# Patient Record
Sex: MALE | Race: WHITE | Employment: OTHER | URBAN - METROPOLITAN AREA
[De-identification: names, ages, dates, MRNs, and addresses within clinical notes are randomized per-mention and may not be internally consistent; named-entity substitution may affect disease eponyms.]

---

## 2019-01-01 ENCOUNTER — APPOINTMENT (OUTPATIENT)
Dept: CT IMAGING | Age: 69
DRG: 001 | End: 2019-01-01
Attending: PSYCHIATRY & NEUROLOGY
Payer: MEDICARE

## 2019-01-01 ENCOUNTER — APPOINTMENT (OUTPATIENT)
Dept: GENERAL RADIOLOGY | Age: 69
DRG: 001 | End: 2019-01-01
Attending: NURSE PRACTITIONER
Payer: MEDICARE

## 2019-01-01 ENCOUNTER — OFFICE VISIT (OUTPATIENT)
Dept: CARDIOLOGY CLINIC | Age: 69
End: 2019-01-01

## 2019-01-01 ENCOUNTER — TELEPHONE (OUTPATIENT)
Dept: CARDIOLOGY CLINIC | Age: 69
End: 2019-01-01

## 2019-01-01 ENCOUNTER — APPOINTMENT (OUTPATIENT)
Dept: GENERAL RADIOLOGY | Age: 69
DRG: 001 | End: 2019-01-01
Attending: THORACIC SURGERY (CARDIOTHORACIC VASCULAR SURGERY)
Payer: MEDICARE

## 2019-01-01 ENCOUNTER — ANESTHESIA EVENT (OUTPATIENT)
Dept: SURGERY | Age: 69
DRG: 001 | End: 2019-01-01
Payer: MEDICARE

## 2019-01-01 ENCOUNTER — DOCUMENTATION ONLY (OUTPATIENT)
Dept: SLEEP MEDICINE | Age: 69
End: 2019-01-01

## 2019-01-01 ENCOUNTER — APPOINTMENT (OUTPATIENT)
Dept: GENERAL RADIOLOGY | Age: 69
DRG: 001 | End: 2019-01-01
Payer: MEDICARE

## 2019-01-01 ENCOUNTER — HOSPITAL ENCOUNTER (OUTPATIENT)
Dept: SLEEP MEDICINE | Age: 69
Discharge: HOME | End: 2019-09-30
Payer: MEDICARE

## 2019-01-01 ENCOUNTER — APPOINTMENT (OUTPATIENT)
Dept: PET IMAGING | Age: 69
DRG: 001 | End: 2019-01-01
Payer: MEDICARE

## 2019-01-01 ENCOUNTER — APPOINTMENT (OUTPATIENT)
Dept: GENERAL RADIOLOGY | Age: 69
DRG: 001 | End: 2019-01-01
Attending: INTERNAL MEDICINE
Payer: MEDICARE

## 2019-01-01 ENCOUNTER — APPOINTMENT (OUTPATIENT)
Dept: GENERAL RADIOLOGY | Age: 69
DRG: 001 | End: 2019-01-01
Attending: RADIOLOGY
Payer: MEDICARE

## 2019-01-01 ENCOUNTER — APPOINTMENT (OUTPATIENT)
Dept: NUCLEAR MEDICINE | Age: 69
DRG: 001 | End: 2019-01-01
Attending: INTERNAL MEDICINE
Payer: MEDICARE

## 2019-01-01 ENCOUNTER — APPOINTMENT (OUTPATIENT)
Dept: NON INVASIVE DIAGNOSTICS | Age: 69
DRG: 001 | End: 2019-01-01
Attending: INTERNAL MEDICINE
Payer: MEDICARE

## 2019-01-01 ENCOUNTER — TELEPHONE (OUTPATIENT)
Dept: SLEEP MEDICINE | Age: 69
End: 2019-01-01

## 2019-01-01 ENCOUNTER — APPOINTMENT (OUTPATIENT)
Dept: VASCULAR SURGERY | Age: 69
DRG: 001 | End: 2019-01-01
Attending: INTERNAL MEDICINE
Payer: MEDICARE

## 2019-01-01 ENCOUNTER — APPOINTMENT (OUTPATIENT)
Dept: VASCULAR SURGERY | Age: 69
DRG: 001 | End: 2019-01-01
Attending: NURSE PRACTITIONER
Payer: MEDICARE

## 2019-01-01 ENCOUNTER — APPOINTMENT (OUTPATIENT)
Dept: CT IMAGING | Age: 69
DRG: 001 | End: 2019-01-01
Attending: NURSE PRACTITIONER
Payer: MEDICARE

## 2019-01-01 ENCOUNTER — ANESTHESIA EVENT (OUTPATIENT)
Dept: ENDOSCOPY | Age: 69
DRG: 001 | End: 2019-01-01
Payer: MEDICARE

## 2019-01-01 ENCOUNTER — ANESTHESIA EVENT (OUTPATIENT)
Dept: CARDIOTHORACIC SURGERY | Age: 69
DRG: 001 | End: 2019-01-01
Payer: MEDICARE

## 2019-01-01 ENCOUNTER — APPOINTMENT (OUTPATIENT)
Dept: NON INVASIVE DIAGNOSTICS | Age: 69
DRG: 001 | End: 2019-01-01
Attending: THORACIC SURGERY (CARDIOTHORACIC VASCULAR SURGERY)
Payer: MEDICARE

## 2019-01-01 ENCOUNTER — HOSPITAL ENCOUNTER (INPATIENT)
Age: 69
LOS: 97 days | Discharge: NURSING, REHAB OR LONG-TERM CARE | DRG: 001 | End: 2020-01-30
Attending: INTERNAL MEDICINE | Admitting: INTERNAL MEDICINE
Payer: MEDICARE

## 2019-01-01 ENCOUNTER — APPOINTMENT (OUTPATIENT)
Dept: ULTRASOUND IMAGING | Age: 69
DRG: 001 | End: 2019-01-01
Attending: NURSE PRACTITIONER
Payer: MEDICARE

## 2019-01-01 ENCOUNTER — APPOINTMENT (OUTPATIENT)
Dept: NUCLEAR MEDICINE | Age: 69
DRG: 001 | End: 2019-01-01
Attending: NURSE PRACTITIONER
Payer: MEDICARE

## 2019-01-01 ENCOUNTER — APPOINTMENT (OUTPATIENT)
Dept: NON INVASIVE DIAGNOSTICS | Age: 69
DRG: 001 | End: 2019-01-01
Attending: NURSE PRACTITIONER
Payer: MEDICARE

## 2019-01-01 ENCOUNTER — ANESTHESIA (OUTPATIENT)
Dept: ENDOSCOPY | Age: 69
DRG: 001 | End: 2019-01-01
Payer: MEDICARE

## 2019-01-01 ENCOUNTER — ANESTHESIA (OUTPATIENT)
Dept: CARDIOTHORACIC SURGERY | Age: 69
DRG: 001 | End: 2019-01-01
Payer: MEDICARE

## 2019-01-01 ENCOUNTER — HOSPITAL ENCOUNTER (OUTPATIENT)
Dept: NON INVASIVE DIAGNOSTICS | Age: 69
Discharge: HOME | End: 2019-11-18
Attending: THORACIC SURGERY (CARDIOTHORACIC VASCULAR SURGERY)

## 2019-01-01 ENCOUNTER — HOSPITAL ENCOUNTER (OUTPATIENT)
Age: 69
Setting detail: OUTPATIENT SURGERY
Discharge: HOME | End: 2019-09-18
Attending: INTERNAL MEDICINE | Admitting: INTERNAL MEDICINE
Payer: MEDICARE

## 2019-01-01 ENCOUNTER — HOSPITAL ENCOUNTER (OUTPATIENT)
Dept: NON INVASIVE DIAGNOSTICS | Age: 69
Discharge: HOME | End: 2019-10-29
Attending: THORACIC SURGERY (CARDIOTHORACIC VASCULAR SURGERY)

## 2019-01-01 ENCOUNTER — ANESTHESIA (OUTPATIENT)
Dept: SURGERY | Age: 69
DRG: 001 | End: 2019-01-01
Payer: MEDICARE

## 2019-01-01 ENCOUNTER — HOSPITAL ENCOUNTER (OUTPATIENT)
Dept: NUCLEAR MEDICINE | Age: 69
Discharge: HOME | DRG: 001 | End: 2019-10-30
Attending: NURSE PRACTITIONER
Payer: MEDICARE

## 2019-01-01 ENCOUNTER — OFFICE VISIT (OUTPATIENT)
Dept: SLEEP MEDICINE | Age: 69
End: 2019-01-01

## 2019-01-01 ENCOUNTER — HOSPITAL ENCOUNTER (OUTPATIENT)
Dept: SLEEP MEDICINE | Age: 69
Discharge: HOME | End: 2019-09-16
Attending: INTERNAL MEDICINE
Payer: MEDICARE

## 2019-01-01 ENCOUNTER — NURSE NAVIGATOR (OUTPATIENT)
Dept: CASE MANAGEMENT | Age: 69
End: 2019-01-01

## 2019-01-01 ENCOUNTER — APPOINTMENT (OUTPATIENT)
Dept: VASCULAR SURGERY | Age: 69
DRG: 001 | End: 2019-01-01
Payer: MEDICARE

## 2019-01-01 ENCOUNTER — TELEPHONE (OUTPATIENT)
Dept: CARDIAC REHAB | Age: 69
End: 2019-01-01

## 2019-01-01 VITALS
RESPIRATION RATE: 20 BRPM | WEIGHT: 196 LBS | BODY MASS INDEX: 25.15 KG/M2 | OXYGEN SATURATION: 95 % | DIASTOLIC BLOOD PRESSURE: 53 MMHG | HEART RATE: 70 BPM | HEIGHT: 74 IN | SYSTOLIC BLOOD PRESSURE: 92 MMHG | TEMPERATURE: 98.1 F

## 2019-01-01 VITALS
OXYGEN SATURATION: 93 % | BODY MASS INDEX: 25.65 KG/M2 | WEIGHT: 199.9 LBS | DIASTOLIC BLOOD PRESSURE: 63 MMHG | TEMPERATURE: 97.1 F | HEART RATE: 72 BPM | HEIGHT: 74 IN | SYSTOLIC BLOOD PRESSURE: 96 MMHG

## 2019-01-01 VITALS
SYSTOLIC BLOOD PRESSURE: 100 MMHG | HEART RATE: 70 BPM | HEIGHT: 74 IN | TEMPERATURE: 96.9 F | BODY MASS INDEX: 26.64 KG/M2 | OXYGEN SATURATION: 99 % | WEIGHT: 207.6 LBS | DIASTOLIC BLOOD PRESSURE: 70 MMHG | RESPIRATION RATE: 18 BRPM

## 2019-01-01 VITALS
DIASTOLIC BLOOD PRESSURE: 71 MMHG | WEIGHT: 199 LBS | BODY MASS INDEX: 25.54 KG/M2 | SYSTOLIC BLOOD PRESSURE: 104 MMHG | HEIGHT: 74 IN | OXYGEN SATURATION: 94 % | HEART RATE: 80 BPM

## 2019-01-01 VITALS
HEIGHT: 74 IN | BODY MASS INDEX: 25.93 KG/M2 | DIASTOLIC BLOOD PRESSURE: 68 MMHG | OXYGEN SATURATION: 97 % | TEMPERATURE: 97.4 F | WEIGHT: 202 LBS | RESPIRATION RATE: 20 BRPM | HEART RATE: 70 BPM | SYSTOLIC BLOOD PRESSURE: 106 MMHG

## 2019-01-01 VITALS
TEMPERATURE: 97.8 F | HEART RATE: 70 BPM | BODY MASS INDEX: 24.83 KG/M2 | RESPIRATION RATE: 18 BRPM | SYSTOLIC BLOOD PRESSURE: 94 MMHG | DIASTOLIC BLOOD PRESSURE: 62 MMHG | WEIGHT: 193.4 LBS | OXYGEN SATURATION: 97 %

## 2019-01-01 VITALS
DIASTOLIC BLOOD PRESSURE: 68 MMHG | HEART RATE: 85 BPM | BODY MASS INDEX: 26.28 KG/M2 | WEIGHT: 204.8 LBS | OXYGEN SATURATION: 97 % | HEIGHT: 74 IN | SYSTOLIC BLOOD PRESSURE: 101 MMHG

## 2019-01-01 DIAGNOSIS — I50.22 SYSTOLIC CHF, CHRONIC (HCC): ICD-10-CM

## 2019-01-01 DIAGNOSIS — Z86.79 H/O VENTRICULAR FIBRILLATION: ICD-10-CM

## 2019-01-01 DIAGNOSIS — G25.3 MYOCLONUS: ICD-10-CM

## 2019-01-01 DIAGNOSIS — R78.81 GRAM-NEGATIVE BACTEREMIA: ICD-10-CM

## 2019-01-01 DIAGNOSIS — I73.9 PERIPHERAL VASCULAR DISEASE (HCC): ICD-10-CM

## 2019-01-01 DIAGNOSIS — G56.12 MEDIAN NERVE DYSFUNCTION, LEFT: ICD-10-CM

## 2019-01-01 DIAGNOSIS — R54 FRAILTY SYNDROME IN GERIATRIC PATIENT: ICD-10-CM

## 2019-01-01 DIAGNOSIS — I25.5 ISCHEMIC CARDIOMYOPATHY: Primary | ICD-10-CM

## 2019-01-01 DIAGNOSIS — R59.0 MEDIASTINAL LYMPHADENOPATHY: ICD-10-CM

## 2019-01-01 DIAGNOSIS — D61.818 PANCYTOPENIA (HCC): ICD-10-CM

## 2019-01-01 DIAGNOSIS — R06.02 SHORTNESS OF BREATH: ICD-10-CM

## 2019-01-01 DIAGNOSIS — E46 MALNUTRITION, UNSPECIFIED TYPE (HCC): ICD-10-CM

## 2019-01-01 DIAGNOSIS — Z99.89 OSA ON CPAP: ICD-10-CM

## 2019-01-01 DIAGNOSIS — I50.810 RVF (RIGHT VENTRICULAR FAILURE) (HCC): ICD-10-CM

## 2019-01-01 DIAGNOSIS — R55 SYNCOPE, UNSPECIFIED SYNCOPE TYPE: ICD-10-CM

## 2019-01-01 DIAGNOSIS — N18.30 STAGE 3 CHRONIC KIDNEY DISEASE (HCC): ICD-10-CM

## 2019-01-01 DIAGNOSIS — I48.0 PAROXYSMAL ATRIAL FIBRILLATION (HCC): ICD-10-CM

## 2019-01-01 DIAGNOSIS — I50.9 ACUTE DECOMPENSATED HEART FAILURE (HCC): ICD-10-CM

## 2019-01-01 DIAGNOSIS — H49.01 RIGHT OCULOMOTOR NERVE PALSY: ICD-10-CM

## 2019-01-01 DIAGNOSIS — Z99.81 ON SUPPLEMENTAL OXYGEN THERAPY: ICD-10-CM

## 2019-01-01 DIAGNOSIS — I50.9 DYSPNEA DUE TO CONGESTIVE HEART FAILURE (HCC): ICD-10-CM

## 2019-01-01 DIAGNOSIS — N30.01 ACUTE CYSTITIS WITH HEMATURIA: ICD-10-CM

## 2019-01-01 DIAGNOSIS — Z79.899 RECEIVING INOTROPIC MEDICATION: ICD-10-CM

## 2019-01-01 DIAGNOSIS — G47.33 OSA ON CPAP: ICD-10-CM

## 2019-01-01 DIAGNOSIS — G47.33 OSA (OBSTRUCTIVE SLEEP APNEA): Primary | ICD-10-CM

## 2019-01-01 DIAGNOSIS — J96.00 ACUTE RESPIRATORY FAILURE, UNSPECIFIED WHETHER WITH HYPOXIA OR HYPERCAPNIA (HCC): ICD-10-CM

## 2019-01-01 DIAGNOSIS — I50.23 HEART FAILURE, SYSTOLIC, ACUTE ON CHRONIC (HCC): ICD-10-CM

## 2019-01-01 DIAGNOSIS — R42 DIZZINESS: ICD-10-CM

## 2019-01-01 DIAGNOSIS — Z86.79 H/O HEART FAILURE: ICD-10-CM

## 2019-01-01 DIAGNOSIS — I48.0 PAROXYSMAL ATRIAL FIBRILLATION (HCC): Primary | ICD-10-CM

## 2019-01-01 DIAGNOSIS — M79.601 RIGHT ARM PAIN: ICD-10-CM

## 2019-01-01 DIAGNOSIS — E61.1 IRON DEFICIENCY: ICD-10-CM

## 2019-01-01 DIAGNOSIS — R64 CACHEXIA (HCC): ICD-10-CM

## 2019-01-01 DIAGNOSIS — G47.33 OSA (OBSTRUCTIVE SLEEP APNEA): ICD-10-CM

## 2019-01-01 DIAGNOSIS — R31.0 GROSS HEMATURIA: ICD-10-CM

## 2019-01-01 DIAGNOSIS — I50.22 CHRONIC SYSTOLIC HEART FAILURE (HCC): ICD-10-CM

## 2019-01-01 DIAGNOSIS — R53.81 PHYSICAL DECONDITIONING: ICD-10-CM

## 2019-01-01 DIAGNOSIS — I95.1 ORTHOSTATIC HYPOTENSION: ICD-10-CM

## 2019-01-01 DIAGNOSIS — Z87.898 HISTORY OF URINARY RETENTION: ICD-10-CM

## 2019-01-01 DIAGNOSIS — I50.23 SYSTOLIC CHF, ACUTE ON CHRONIC (HCC): ICD-10-CM

## 2019-01-01 DIAGNOSIS — I25.5 ISCHEMIC CARDIOMYOPATHY: ICD-10-CM

## 2019-01-01 DIAGNOSIS — R57.0 SHOCK, CARDIOGENIC (HCC): ICD-10-CM

## 2019-01-01 DIAGNOSIS — Z51.81 ANTICOAGULATION MANAGEMENT ENCOUNTER: ICD-10-CM

## 2019-01-01 DIAGNOSIS — J38.00 VOCAL CORD PARALYSIS: ICD-10-CM

## 2019-01-01 DIAGNOSIS — G25.5 CHOREA: ICD-10-CM

## 2019-01-01 DIAGNOSIS — R04.0 EPISTAXIS: ICD-10-CM

## 2019-01-01 DIAGNOSIS — E87.70 HYPERVOLEMIA, UNSPECIFIED HYPERVOLEMIA TYPE: ICD-10-CM

## 2019-01-01 DIAGNOSIS — I50.23 ACUTE ON CHRONIC SYSTOLIC CHF (CONGESTIVE HEART FAILURE) (HCC): ICD-10-CM

## 2019-01-01 DIAGNOSIS — R25.1 TREMOR: ICD-10-CM

## 2019-01-01 DIAGNOSIS — F41.8 ANXIETY ABOUT HEALTH: ICD-10-CM

## 2019-01-01 DIAGNOSIS — Z90.5 SINGLE KIDNEY: ICD-10-CM

## 2019-01-01 DIAGNOSIS — Z95.811 LVAD (LEFT VENTRICULAR ASSIST DEVICE) PRESENT (HCC): ICD-10-CM

## 2019-01-01 DIAGNOSIS — D69.6 THROMBOCYTOPENIA (HCC): ICD-10-CM

## 2019-01-01 DIAGNOSIS — Z01.818 PREOPERATIVE EVALUATION OF A MEDICAL CONDITION TO RULE OUT SURGICAL CONTRAINDICATIONS (TAR REQUIRED): ICD-10-CM

## 2019-01-01 DIAGNOSIS — Z79.01 ANTICOAGULATION MANAGEMENT ENCOUNTER: ICD-10-CM

## 2019-01-01 LAB
25(OH)D3 SERPL-MCNC: 18.9 NG/ML (ref 30–100)
ABO + RH BLD: NORMAL
ACT BLD: 125 SECS (ref 79–138)
ADMINISTERED INITIALS, ADMINIT: NORMAL
ALBUMIN SERPL-MCNC: 2 G/DL (ref 3.5–5)
ALBUMIN SERPL-MCNC: 2.1 G/DL (ref 3.5–5)
ALBUMIN SERPL-MCNC: 2.2 G/DL (ref 3.5–5)
ALBUMIN SERPL-MCNC: 2.3 G/DL (ref 3.5–5)
ALBUMIN SERPL-MCNC: 2.4 G/DL (ref 3.5–5)
ALBUMIN SERPL-MCNC: 2.5 G/DL (ref 3.5–5)
ALBUMIN SERPL-MCNC: 2.6 G/DL (ref 3.5–5)
ALBUMIN SERPL-MCNC: 2.7 G/DL (ref 3.5–5)
ALBUMIN SERPL-MCNC: 2.8 G/DL (ref 3.5–5)
ALBUMIN SERPL-MCNC: 2.9 G/DL (ref 3.5–5)
ALBUMIN SERPL-MCNC: 3.1 G/DL (ref 3.5–5)
ALBUMIN SERPL-MCNC: 3.2 G/DL (ref 3.5–5)
ALBUMIN SERPL-MCNC: 3.2 G/DL (ref 3.5–5)
ALBUMIN/GLOB SERPL: 0.6 {RATIO} (ref 1.1–2.2)
ALBUMIN/GLOB SERPL: 0.7 {RATIO} (ref 1.1–2.2)
ALBUMIN/GLOB SERPL: 0.8 {RATIO} (ref 1.1–2.2)
ALBUMIN/GLOB SERPL: 0.9 {RATIO} (ref 1.1–2.2)
ALBUMIN/GLOB SERPL: 1 {RATIO} (ref 1.1–2.2)
ALBUMIN/GLOB SERPL: 1 {RATIO} (ref 1.1–2.2)
ALBUMIN/GLOB SERPL: 1.1 {RATIO} (ref 1.1–2.2)
ALBUMIN/GLOB SERPL: 1.2 {RATIO} (ref 1.1–2.2)
ALBUMIN/GLOB SERPL: 1.2 {RATIO} (ref 1.1–2.2)
ALP SERPL-CCNC: 100 U/L (ref 45–117)
ALP SERPL-CCNC: 101 U/L (ref 45–117)
ALP SERPL-CCNC: 101 U/L (ref 45–117)
ALP SERPL-CCNC: 103 U/L (ref 45–117)
ALP SERPL-CCNC: 104 U/L (ref 45–117)
ALP SERPL-CCNC: 105 U/L (ref 45–117)
ALP SERPL-CCNC: 106 U/L (ref 45–117)
ALP SERPL-CCNC: 106 U/L (ref 45–117)
ALP SERPL-CCNC: 107 U/L (ref 45–117)
ALP SERPL-CCNC: 110 U/L (ref 45–117)
ALP SERPL-CCNC: 111 U/L (ref 45–117)
ALP SERPL-CCNC: 114 U/L (ref 45–117)
ALP SERPL-CCNC: 115 U/L (ref 45–117)
ALP SERPL-CCNC: 115 U/L (ref 45–117)
ALP SERPL-CCNC: 119 U/L (ref 45–117)
ALP SERPL-CCNC: 121 U/L (ref 45–117)
ALP SERPL-CCNC: 122 U/L (ref 45–117)
ALP SERPL-CCNC: 122 U/L (ref 45–117)
ALP SERPL-CCNC: 124 U/L (ref 45–117)
ALP SERPL-CCNC: 124 U/L (ref 45–117)
ALP SERPL-CCNC: 125 U/L (ref 45–117)
ALP SERPL-CCNC: 126 U/L (ref 45–117)
ALP SERPL-CCNC: 126 U/L (ref 45–117)
ALP SERPL-CCNC: 130 U/L (ref 45–117)
ALP SERPL-CCNC: 131 U/L (ref 45–117)
ALP SERPL-CCNC: 131 U/L (ref 45–117)
ALP SERPL-CCNC: 132 U/L (ref 45–117)
ALP SERPL-CCNC: 133 U/L (ref 45–117)
ALP SERPL-CCNC: 136 U/L (ref 45–117)
ALP SERPL-CCNC: 136 U/L (ref 45–117)
ALP SERPL-CCNC: 137 U/L (ref 45–117)
ALP SERPL-CCNC: 137 U/L (ref 45–117)
ALP SERPL-CCNC: 139 U/L (ref 45–117)
ALP SERPL-CCNC: 140 U/L (ref 45–117)
ALP SERPL-CCNC: 141 U/L (ref 45–117)
ALP SERPL-CCNC: 143 U/L (ref 45–117)
ALP SERPL-CCNC: 146 U/L (ref 45–117)
ALP SERPL-CCNC: 153 U/L (ref 45–117)
ALP SERPL-CCNC: 66 U/L (ref 45–117)
ALP SERPL-CCNC: 67 U/L (ref 45–117)
ALP SERPL-CCNC: 72 U/L (ref 45–117)
ALP SERPL-CCNC: 76 U/L (ref 45–117)
ALP SERPL-CCNC: 82 U/L (ref 45–117)
ALP SERPL-CCNC: 84 U/L (ref 45–117)
ALP SERPL-CCNC: 87 U/L (ref 45–117)
ALP SERPL-CCNC: 89 U/L (ref 45–117)
ALP SERPL-CCNC: 90 U/L (ref 45–117)
ALP SERPL-CCNC: 91 U/L (ref 45–117)
ALP SERPL-CCNC: 92 U/L (ref 45–117)
ALP SERPL-CCNC: 93 U/L (ref 45–117)
ALP SERPL-CCNC: 94 U/L (ref 45–117)
ALP SERPL-CCNC: 96 U/L (ref 45–117)
ALP SERPL-CCNC: 97 U/L (ref 45–117)
ALP SERPL-CCNC: 98 U/L (ref 45–117)
ALT SERPL-CCNC: 10 U/L (ref 12–78)
ALT SERPL-CCNC: 10 U/L (ref 12–78)
ALT SERPL-CCNC: 107 U/L (ref 12–78)
ALT SERPL-CCNC: 11 U/L (ref 12–78)
ALT SERPL-CCNC: 114 U/L (ref 12–78)
ALT SERPL-CCNC: 12 U/L (ref 12–78)
ALT SERPL-CCNC: 13 U/L (ref 12–78)
ALT SERPL-CCNC: 13 U/L (ref 12–78)
ALT SERPL-CCNC: 138 U/L (ref 12–78)
ALT SERPL-CCNC: 14 U/L (ref 12–78)
ALT SERPL-CCNC: 15 U/L (ref 12–78)
ALT SERPL-CCNC: 15 U/L (ref 12–78)
ALT SERPL-CCNC: 156 U/L (ref 12–78)
ALT SERPL-CCNC: 16 U/L (ref 12–78)
ALT SERPL-CCNC: 18 U/L (ref 12–78)
ALT SERPL-CCNC: 19 U/L (ref 12–78)
ALT SERPL-CCNC: 19 U/L (ref 12–78)
ALT SERPL-CCNC: 20 U/L (ref 12–78)
ALT SERPL-CCNC: 22 U/L (ref 12–78)
ALT SERPL-CCNC: 23 U/L (ref 12–78)
ALT SERPL-CCNC: 24 U/L (ref 12–78)
ALT SERPL-CCNC: 27 U/L (ref 12–78)
ALT SERPL-CCNC: 28 U/L (ref 12–78)
ALT SERPL-CCNC: 28 U/L (ref 12–78)
ALT SERPL-CCNC: 30 U/L (ref 12–78)
ALT SERPL-CCNC: 41 U/L (ref 12–78)
ALT SERPL-CCNC: 6 U/L (ref 12–78)
ALT SERPL-CCNC: 7 U/L (ref 12–78)
ALT SERPL-CCNC: 8 U/L (ref 12–78)
ALT SERPL-CCNC: 8 U/L (ref 12–78)
ALT SERPL-CCNC: 9 U/L (ref 12–78)
ALT SERPL-CCNC: 94 U/L (ref 12–78)
ALT SERPL-CCNC: <6 U/L (ref 12–78)
AMMONIA PLAS-SCNC: 15 UMOL/L
AMPHET UR QL SCN: NEGATIVE
ANION GAP SERPL CALC-SCNC: 1 MMOL/L (ref 5–15)
ANION GAP SERPL CALC-SCNC: 1 MMOL/L (ref 5–15)
ANION GAP SERPL CALC-SCNC: 2 MMOL/L (ref 5–15)
ANION GAP SERPL CALC-SCNC: 3 MMOL/L (ref 5–15)
ANION GAP SERPL CALC-SCNC: 4 MMOL/L (ref 5–15)
ANION GAP SERPL CALC-SCNC: 5 MMOL/L (ref 5–15)
ANION GAP SERPL CALC-SCNC: 6 MMOL/L (ref 5–15)
ANION GAP SERPL CALC-SCNC: 7 MMOL/L (ref 5–15)
ANION GAP SERPL CALC-SCNC: 8 MMOL/L (ref 5–15)
ANION GAP SERPL CALC-SCNC: 9 MMOL/L (ref 5–15)
ANTI-COMPLEMENT (C3B,C3D): NORMAL
APPEARANCE UR: ABNORMAL
APPEARANCE UR: ABNORMAL
APPEARANCE UR: CLEAR
APTT PPP: 117.1 SEC (ref 22.1–32)
APTT PPP: 125.7 SEC (ref 22.1–32)
APTT PPP: 22.9 SEC (ref 22.1–32)
APTT PPP: 23.4 SEC (ref 22.1–32)
APTT PPP: 26.2 SEC (ref 22.1–32)
APTT PPP: 26.3 SEC (ref 22.1–32)
APTT PPP: 26.4 SEC (ref 22.1–32)
APTT PPP: 26.5 SEC (ref 22.1–32)
APTT PPP: 27.1 SEC (ref 22.1–32)
APTT PPP: 27.5 SEC (ref 22.1–32)
APTT PPP: 27.6 SEC (ref 22.1–32)
APTT PPP: 27.7 SEC (ref 22.1–32)
APTT PPP: 27.7 SEC (ref 22.1–32)
APTT PPP: 27.8 SEC (ref 22.1–32)
APTT PPP: 27.8 SEC (ref 22.1–32)
APTT PPP: 27.9 SEC (ref 22.1–32)
APTT PPP: 27.9 SEC (ref 22.1–32)
APTT PPP: 28.1 SEC (ref 22.1–32)
APTT PPP: 28.2 SEC (ref 22.1–32)
APTT PPP: 28.7 SEC (ref 22.1–32)
APTT PPP: 29.2 SEC (ref 22.1–32)
APTT PPP: 29.3 SEC (ref 22.1–32)
APTT PPP: 29.6 SEC (ref 22.1–32)
APTT PPP: 29.7 SEC (ref 22.1–32)
APTT PPP: 30.7 SEC (ref 22.1–32)
APTT PPP: 31.2 SEC (ref 22.1–32)
APTT PPP: 31.3 SEC (ref 22.1–32)
APTT PPP: 31.3 SEC (ref 22.1–32)
APTT PPP: 31.7 SEC (ref 22.1–32)
APTT PPP: 31.9 SEC (ref 22.1–32)
APTT PPP: 31.9 SEC (ref 22.1–32)
APTT PPP: 32.1 SEC (ref 22.1–32)
APTT PPP: 32.2 SEC (ref 22.1–32)
APTT PPP: 32.6 SEC (ref 22.1–32)
APTT PPP: 33.5 SEC (ref 22.1–32)
APTT PPP: 33.6 SEC (ref 22.1–32)
APTT PPP: 35 SEC (ref 22.1–32)
APTT PPP: 35.5 SEC (ref 22.1–32)
APTT PPP: 35.6 SEC (ref 22.1–32)
APTT PPP: 36.3 SEC (ref 22.1–32)
APTT PPP: 36.9 SEC (ref 22.1–32)
APTT PPP: 37 SEC (ref 22.1–32)
APTT PPP: 37.3 SEC (ref 22.1–32)
APTT PPP: 37.5 SEC (ref 22.1–32)
APTT PPP: 37.5 SEC (ref 22.1–32)
APTT PPP: 37.6 SEC (ref 22.1–32)
APTT PPP: 37.7 SEC (ref 22.1–32)
APTT PPP: 37.8 SEC (ref 22.1–32)
APTT PPP: 37.9 SEC (ref 22.1–32)
APTT PPP: 38.8 SEC (ref 22.1–32)
APTT PPP: 39.3 SEC (ref 22.1–32)
APTT PPP: 40 SEC (ref 22.1–32)
APTT PPP: 40 SEC (ref 22.1–32)
APTT PPP: 40.4 SEC (ref 22.1–32)
APTT PPP: 40.7 SEC (ref 22.1–32)
APTT PPP: 40.9 SEC (ref 22.1–32)
APTT PPP: 41.2 SEC (ref 22.1–32)
APTT PPP: 41.4 SEC (ref 22.1–32)
APTT PPP: 41.9 SEC (ref 22.1–32)
APTT PPP: 41.9 SEC (ref 22.1–32)
APTT PPP: 43.4 SEC (ref 22.1–32)
APTT PPP: 47 SEC (ref 22.1–32)
APTT PPP: 47.6 SEC (ref 22.1–32)
APTT PPP: 48.9 SEC (ref 22.1–32)
APTT PPP: 49.5 SEC (ref 22.1–32)
APTT PPP: 50.3 SEC (ref 22.1–32)
APTT PPP: 51.3 SEC (ref 22.1–32)
APTT PPP: 51.7 SEC (ref 22.1–32)
APTT PPP: 55.9 SEC (ref 22.1–32)
APTT PPP: 55.9 SEC (ref 22.1–32)
APTT PPP: 57.2 SEC (ref 22.1–32)
APTT PPP: 57.5 SEC (ref 22.1–32)
APTT PPP: 58.5 SEC (ref 22.1–32)
APTT PPP: 60.9 SEC (ref 22.1–32)
APTT PPP: 62.1 SEC (ref 22.1–32)
APTT PPP: 63.9 SEC (ref 22.1–32)
APTT PPP: 64.1 SEC (ref 22.1–32)
APTT PPP: 64.1 SEC (ref 22.1–32)
APTT PPP: 64.4 SEC (ref 22.1–32)
APTT PPP: 65.6 SEC (ref 22.1–32)
APTT PPP: 65.9 SEC (ref 22.1–32)
APTT PPP: 66.2 SEC (ref 22.1–32)
APTT PPP: 66.2 SEC (ref 22.1–32)
APTT PPP: 67.5 SEC (ref 22.1–32)
APTT PPP: 67.7 SEC (ref 22.1–32)
APTT PPP: 68.1 SEC (ref 22.1–32)
APTT PPP: 69.3 SEC (ref 22.1–32)
APTT PPP: 69.8 SEC (ref 22.1–32)
APTT PPP: 69.9 SEC (ref 22.1–32)
APTT PPP: 70.3 SEC (ref 22.1–32)
APTT PPP: 71.1 SEC (ref 22.1–32)
APTT PPP: 73.5 SEC (ref 22.1–32)
APTT PPP: 73.7 SEC (ref 22.1–32)
APTT PPP: 74.4 SEC (ref 22.1–32)
APTT PPP: 77.2 SEC (ref 22.1–32)
APTT PPP: 84 SEC (ref 22.1–32)
ARTERIAL PATENCY WRIST A: ABNORMAL
ARTERIAL PATENCY WRIST A: NORMAL
ARTERIAL PATENCY WRIST A: NORMAL
ARTERIAL PATENCY WRIST A: YES
AST SERPL-CCNC: 10 U/L (ref 15–37)
AST SERPL-CCNC: 109 U/L (ref 15–37)
AST SERPL-CCNC: 11 U/L (ref 15–37)
AST SERPL-CCNC: 11 U/L (ref 15–37)
AST SERPL-CCNC: 12 U/L (ref 15–37)
AST SERPL-CCNC: 13 U/L (ref 15–37)
AST SERPL-CCNC: 13 U/L (ref 15–37)
AST SERPL-CCNC: 14 U/L (ref 15–37)
AST SERPL-CCNC: 15 U/L (ref 15–37)
AST SERPL-CCNC: 16 U/L (ref 15–37)
AST SERPL-CCNC: 17 U/L (ref 15–37)
AST SERPL-CCNC: 18 U/L (ref 15–37)
AST SERPL-CCNC: 20 U/L (ref 15–37)
AST SERPL-CCNC: 20 U/L (ref 15–37)
AST SERPL-CCNC: 21 U/L (ref 15–37)
AST SERPL-CCNC: 22 U/L (ref 15–37)
AST SERPL-CCNC: 23 U/L (ref 15–37)
AST SERPL-CCNC: 24 U/L (ref 15–37)
AST SERPL-CCNC: 25 U/L (ref 15–37)
AST SERPL-CCNC: 26 U/L (ref 15–37)
AST SERPL-CCNC: 26 U/L (ref 15–37)
AST SERPL-CCNC: 27 U/L (ref 15–37)
AST SERPL-CCNC: 27 U/L (ref 15–37)
AST SERPL-CCNC: 28 U/L (ref 15–37)
AST SERPL-CCNC: 28 U/L (ref 15–37)
AST SERPL-CCNC: 29 U/L (ref 15–37)
AST SERPL-CCNC: 30 U/L (ref 15–37)
AST SERPL-CCNC: 34 U/L (ref 15–37)
AST SERPL-CCNC: 35 U/L (ref 15–37)
AST SERPL-CCNC: 36 U/L (ref 15–37)
AST SERPL-CCNC: 40 U/L (ref 15–37)
AST SERPL-CCNC: 40 U/L (ref 15–37)
AST SERPL-CCNC: 42 U/L (ref 15–37)
AST SERPL-CCNC: 46 U/L (ref 15–37)
AST SERPL-CCNC: 48 U/L (ref 15–37)
AST SERPL-CCNC: 49 U/L (ref 15–37)
AST SERPL-CCNC: 50 U/L (ref 15–37)
AST SERPL-CCNC: 52 U/L (ref 15–37)
AST SERPL-CCNC: 55 U/L (ref 15–37)
AST SERPL-CCNC: 58 U/L (ref 15–37)
AST SERPL-CCNC: 60 U/L (ref 15–37)
AST SERPL-CCNC: 90 U/L (ref 15–37)
ATRIAL RATE: 0 BPM
ATRIAL RATE: 100 BPM
ATRIAL RATE: 26 BPM
ATRIAL RATE: 39 BPM
ATRIAL RATE: 55 BPM
ATRIAL RATE: 66 BPM
ATRIAL RATE: 72 BPM
ATRIAL RATE: 76 BPM
ATRIAL RATE: 80 BPM
ATRIAL RATE: 83 BPM
ATRIAL RATE: 89 BPM
ATRIAL RATE: 91 BPM
AV VELOCITY RATIO: 0.48
BACTERIA SPEC CULT: ABNORMAL
BACTERIA SPEC CULT: NORMAL
BACTERIA URNS QL MICRO: ABNORMAL /HPF
BACTERIA URNS QL MICRO: NEGATIVE /HPF
BARBITURATES UR QL SCN: NEGATIVE
BASE DEFICIT BLDV-SCNC: 5 MMOL/L
BASE EXCESS BLD CALC-SCNC: 1 MMOL/L
BASE EXCESS BLD CALC-SCNC: 10 MMOL/L
BASE EXCESS BLD CALC-SCNC: 15 MMOL/L
BASE EXCESS BLD CALC-SCNC: 2 MMOL/L
BASE EXCESS BLD CALC-SCNC: 3 MMOL/L
BASE EXCESS BLD CALC-SCNC: 4 MMOL/L
BASE EXCESS BLD CALC-SCNC: 5 MMOL/L
BASE EXCESS BLD CALC-SCNC: 5 MMOL/L
BASE EXCESS BLD CALC-SCNC: 7 MMOL/L
BASE EXCESS BLD CALC-SCNC: 9 MMOL/L
BASE EXCESS BLD CALC-SCNC: 9 MMOL/L
BASE EXCESS BLDV CALC-SCNC: 0 MMOL/L
BASE EXCESS BLDV CALC-SCNC: 0 MMOL/L
BASE EXCESS BLDV CALC-SCNC: 1 MMOL/L
BASE EXCESS BLDV CALC-SCNC: 2 MMOL/L
BASE EXCESS BLDV CALC-SCNC: 3 MMOL/L
BASE EXCESS BLDV CALC-SCNC: 4 MMOL/L
BASE EXCESS BLDV CALC-SCNC: 5 MMOL/L
BASE EXCESS BLDV CALC-SCNC: 6 MMOL/L
BASE EXCESS BLDV CALC-SCNC: 6 MMOL/L
BASE EXCESS BLDV CALC-SCNC: 7 MMOL/L
BASE EXCESS BLDV CALC-SCNC: 7 MMOL/L
BASE EXCESS BLDV CALC-SCNC: 8 MMOL/L
BASE EXCESS BLDV CALC-SCNC: 9 MMOL/L
BASOPHILS # BLD: 0 K/UL (ref 0–0.1)
BASOPHILS NFR BLD: 0 % (ref 0–1)
BASOPHILS NFR BLD: 0 % (ref 0–1)
BASOPHILS NFR BLD: 1 % (ref 0–1)
BDY SITE: ABNORMAL
BDY SITE: NORMAL
BDY SITE: NORMAL
BENZODIAZ UR QL: NEGATIVE
BILIRUB SERPL-MCNC: 0.4 MG/DL (ref 0.2–1)
BILIRUB SERPL-MCNC: 0.4 MG/DL (ref 0.2–1)
BILIRUB SERPL-MCNC: 0.5 MG/DL (ref 0.2–1)
BILIRUB SERPL-MCNC: 0.6 MG/DL (ref 0.2–1)
BILIRUB SERPL-MCNC: 0.7 MG/DL (ref 0.2–1)
BILIRUB SERPL-MCNC: 0.8 MG/DL (ref 0.2–1)
BILIRUB SERPL-MCNC: 0.9 MG/DL (ref 0.2–1)
BILIRUB SERPL-MCNC: 1 MG/DL (ref 0.2–1)
BILIRUB SERPL-MCNC: 1.1 MG/DL (ref 0.2–1)
BILIRUB SERPL-MCNC: 1.2 MG/DL (ref 0.2–1)
BILIRUB SERPL-MCNC: 1.2 MG/DL (ref 0.2–1)
BILIRUB SERPL-MCNC: 1.3 MG/DL (ref 0.2–1)
BILIRUB SERPL-MCNC: 1.4 MG/DL (ref 0.2–1)
BILIRUB SERPL-MCNC: 1.5 MG/DL (ref 0.2–1)
BILIRUB SERPL-MCNC: 1.5 MG/DL (ref 0.2–1)
BILIRUB SERPL-MCNC: 1.6 MG/DL (ref 0.2–1)
BILIRUB SERPL-MCNC: 1.7 MG/DL (ref 0.2–1)
BILIRUB SERPL-MCNC: 1.8 MG/DL (ref 0.2–1)
BILIRUB SERPL-MCNC: 1.8 MG/DL (ref 0.2–1)
BILIRUB SERPL-MCNC: 1.9 MG/DL (ref 0.2–1)
BILIRUB SERPL-MCNC: 1.9 MG/DL (ref 0.2–1)
BILIRUB SERPL-MCNC: 2.2 MG/DL (ref 0.2–1)
BILIRUB SERPL-MCNC: 2.2 MG/DL (ref 0.2–1)
BILIRUB SERPL-MCNC: 2.3 MG/DL (ref 0.2–1)
BILIRUB SERPL-MCNC: 2.4 MG/DL (ref 0.2–1)
BILIRUB SERPL-MCNC: 2.9 MG/DL (ref 0.2–1)
BILIRUB SERPL-MCNC: 3 MG/DL (ref 0.2–1)
BILIRUB SERPL-MCNC: 3.4 MG/DL (ref 0.2–1)
BILIRUB SERPL-MCNC: 3.7 MG/DL (ref 0.2–1)
BILIRUB SERPL-MCNC: 5.1 MG/DL (ref 0.2–1)
BILIRUB SERPL-MCNC: 5.5 MG/DL (ref 0.2–1)
BILIRUB UR QL CFM: POSITIVE
BILIRUB UR QL: NEGATIVE
BLD GP AB SCN SERPL QL ELUTION: NORMAL
BLD GP AB SCN SERPL QL ELUTION: NORMAL
BLD PROD TYP BPU: NORMAL
BLOOD BANK CMNT PATIENT-IMP: NORMAL
BLOOD GROUP ANTIBODIES SERPL: NORMAL
BNP SERPL-MCNC: 1486 PG/ML
BNP SERPL-MCNC: 1747 PG/ML
BNP SERPL-MCNC: 1787 PG/ML
BNP SERPL-MCNC: 1838 PG/ML
BNP SERPL-MCNC: 2041 PG/ML
BNP SERPL-MCNC: 2388 PG/ML
BNP SERPL-MCNC: 2569 PG/ML
BNP SERPL-MCNC: 3497 PG/ML
BNP SERPL-MCNC: 3694 PG/ML
BNP SERPL-MCNC: 3792 PG/ML
BNP SERPL-MCNC: 3804 PG/ML
BNP SERPL-MCNC: 4953 PG/ML
BNP SERPL-MCNC: 5786 PG/ML
BNP SERPL-MCNC: 5789 PG/ML
BNP SERPL-MCNC: 6172 PG/ML
BNP SERPL-MCNC: 6348 PG/ML
BNP SERPL-MCNC: 6598 PG/ML
BNP SERPL-MCNC: 6642 PG/ML
BNP SERPL-MCNC: 6654 PG/ML
BNP SERPL-MCNC: 6746 PG/ML
BNP SERPL-MCNC: 6781 PG/ML
BNP SERPL-MCNC: 6963 PG/ML
BNP SERPL-MCNC: 7103 PG/ML
BNP SERPL-MCNC: 7464 PG/ML
BNP SERPL-MCNC: 7514 PG/ML
BNP SERPL-MCNC: 7552 PG/ML
BNP SERPL-MCNC: 7603 PG/ML
BNP SERPL-MCNC: 7640 PG/ML
BNP SERPL-MCNC: 7682 PG/ML
BNP SERPL-MCNC: 8187 PG/ML
BNP SERPL-MCNC: 8309 PG/ML
BNP SERPL-MCNC: 8799 PG/ML
BNP SERPL-MCNC: 9096 PG/ML
BNP SERPL-MCNC: 9154 PG/ML
BNP SERPL-MCNC: 9848 PG/ML
BNP SERPL-MCNC: 9961 PG/ML
BNP SERPL-MCNC: ABNORMAL PG/ML
BPU ID: NORMAL
BUN SERPL-MCNC: 12 MG/DL (ref 6–20)
BUN SERPL-MCNC: 13 MG/DL (ref 6–20)
BUN SERPL-MCNC: 14 MG/DL (ref 6–20)
BUN SERPL-MCNC: 14 MG/DL (ref 6–20)
BUN SERPL-MCNC: 15 MG/DL (ref 6–20)
BUN SERPL-MCNC: 15 MG/DL (ref 6–20)
BUN SERPL-MCNC: 16 MG/DL (ref 6–20)
BUN SERPL-MCNC: 17 MG/DL (ref 6–20)
BUN SERPL-MCNC: 17 MG/DL (ref 6–20)
BUN SERPL-MCNC: 18 MG/DL (ref 6–20)
BUN SERPL-MCNC: 19 MG/DL (ref 6–20)
BUN SERPL-MCNC: 20 MG/DL (ref 6–20)
BUN SERPL-MCNC: 21 MG/DL (ref 6–20)
BUN SERPL-MCNC: 21 MG/DL (ref 6–20)
BUN SERPL-MCNC: 22 MG/DL (ref 6–20)
BUN SERPL-MCNC: 23 MG/DL (ref 6–20)
BUN SERPL-MCNC: 24 MG/DL (ref 6–20)
BUN SERPL-MCNC: 25 MG/DL (ref 6–20)
BUN SERPL-MCNC: 25 MG/DL (ref 6–20)
BUN SERPL-MCNC: 26 MG/DL (ref 6–20)
BUN SERPL-MCNC: 27 MG/DL (ref 6–20)
BUN SERPL-MCNC: 27 MG/DL (ref 6–20)
BUN SERPL-MCNC: 28 MG/DL (ref 6–20)
BUN SERPL-MCNC: 28 MG/DL (ref 6–20)
BUN SERPL-MCNC: 29 MG/DL (ref 6–20)
BUN SERPL-MCNC: 30 MG/DL (ref 6–20)
BUN SERPL-MCNC: 32 MG/DL (ref 6–20)
BUN SERPL-MCNC: 32 MG/DL (ref 6–20)
BUN SERPL-MCNC: 33 MG/DL (ref 6–20)
BUN SERPL-MCNC: 33 MG/DL (ref 6–20)
BUN SERPL-MCNC: 34 MG/DL (ref 6–20)
BUN SERPL-MCNC: 34 MG/DL (ref 6–20)
BUN SERPL-MCNC: 35 MG/DL (ref 6–20)
BUN SERPL-MCNC: 38 MG/DL (ref 6–20)
BUN SERPL-MCNC: 39 MG/DL (ref 6–20)
BUN SERPL-MCNC: 39 MG/DL (ref 6–20)
BUN SERPL-MCNC: 40 MG/DL (ref 6–20)
BUN SERPL-MCNC: 41 MG/DL (ref 6–20)
BUN SERPL-MCNC: 43 MG/DL (ref 6–20)
BUN SERPL-MCNC: 44 MG/DL (ref 6–20)
BUN/CREAT SERPL: 10 (ref 12–20)
BUN/CREAT SERPL: 11 (ref 12–20)
BUN/CREAT SERPL: 12 (ref 12–20)
BUN/CREAT SERPL: 13 (ref 12–20)
BUN/CREAT SERPL: 14 (ref 12–20)
BUN/CREAT SERPL: 15 (ref 12–20)
BUN/CREAT SERPL: 16 (ref 12–20)
BUN/CREAT SERPL: 17 (ref 12–20)
BUN/CREAT SERPL: 18 (ref 12–20)
BUN/CREAT SERPL: 19 (ref 12–20)
BUN/CREAT SERPL: 20 (ref 12–20)
BUN/CREAT SERPL: 21 (ref 12–20)
BUN/CREAT SERPL: 23 (ref 12–20)
BUN/CREAT SERPL: 24 (ref 12–20)
BUN/CREAT SERPL: 26 (ref 12–20)
BUN/CREAT SERPL: 9 (ref 12–20)
BUN/CREAT SERPL: 9 (ref 12–20)
CALCIUM SERPL-MCNC: 7.9 MG/DL (ref 8.5–10.1)
CALCIUM SERPL-MCNC: 7.9 MG/DL (ref 8.5–10.1)
CALCIUM SERPL-MCNC: 8.2 MG/DL (ref 8.5–10.1)
CALCIUM SERPL-MCNC: 8.2 MG/DL (ref 8.5–10.1)
CALCIUM SERPL-MCNC: 8.3 MG/DL (ref 8.5–10.1)
CALCIUM SERPL-MCNC: 8.4 MG/DL (ref 8.5–10.1)
CALCIUM SERPL-MCNC: 8.5 MG/DL (ref 8.5–10.1)
CALCIUM SERPL-MCNC: 8.6 MG/DL (ref 8.5–10.1)
CALCIUM SERPL-MCNC: 8.7 MG/DL (ref 8.5–10.1)
CALCIUM SERPL-MCNC: 8.8 MG/DL (ref 8.5–10.1)
CALCIUM SERPL-MCNC: 8.9 MG/DL (ref 8.5–10.1)
CALCIUM SERPL-MCNC: 9 MG/DL (ref 8.5–10.1)
CALCIUM SERPL-MCNC: 9.1 MG/DL (ref 8.5–10.1)
CALCIUM SERPL-MCNC: 9.2 MG/DL (ref 8.5–10.1)
CALCIUM SERPL-MCNC: 9.2 MG/DL (ref 8.5–10.1)
CALCIUM SERPL-MCNC: 9.3 MG/DL (ref 8.5–10.1)
CALCIUM SERPL-MCNC: 9.4 MG/DL (ref 8.5–10.1)
CALCIUM SERPL-MCNC: 9.4 MG/DL (ref 8.5–10.1)
CALCIUM SERPL-MCNC: 9.6 MG/DL (ref 8.5–10.1)
CALCULATED P AXIS, ECG09: -20 DEGREES
CALCULATED P AXIS, ECG09: 9 DEGREES
CALCULATED R AXIS, ECG10: -15 DEGREES
CALCULATED R AXIS, ECG10: -19 DEGREES
CALCULATED R AXIS, ECG10: -34 DEGREES
CALCULATED R AXIS, ECG10: -68 DEGREES
CALCULATED R AXIS, ECG10: 108 DEGREES
CALCULATED R AXIS, ECG10: 13 DEGREES
CALCULATED R AXIS, ECG10: 13 DEGREES
CALCULATED R AXIS, ECG10: 148 DEGREES
CALCULATED R AXIS, ECG10: 2 DEGREES
CALCULATED R AXIS, ECG10: 28 DEGREES
CALCULATED R AXIS, ECG10: 5 DEGREES
CALCULATED R AXIS, ECG10: 81 DEGREES
CALCULATED T AXIS, ECG11: -152 DEGREES
CALCULATED T AXIS, ECG11: -156 DEGREES
CALCULATED T AXIS, ECG11: 10 DEGREES
CALCULATED T AXIS, ECG11: 120 DEGREES
CALCULATED T AXIS, ECG11: 131 DEGREES
CALCULATED T AXIS, ECG11: 150 DEGREES
CALCULATED T AXIS, ECG11: 155 DEGREES
CALCULATED T AXIS, ECG11: 157 DEGREES
CALCULATED T AXIS, ECG11: 175 DEGREES
CALCULATED T AXIS, ECG11: 2 DEGREES
CALCULATED T AXIS, ECG11: 20 DEGREES
CALCULATED T AXIS, ECG11: 66 DEGREES
CANCER AG19-9 SERPL-ACNC: 30 U/ML (ref 0–35)
CANNABINOIDS UR QL SCN: NEGATIVE
CARB RATIO, CHOR: 15
CARB RATIO, CHOR: 15
CARBOHYDRATE EATEN, CHO: 104 G
CARBOHYDRATE EATEN, CHO: 86 G
CC UR VC: ABNORMAL
CC UR VC: ABNORMAL
CC UR VC: NORMAL
CEA SERPL-MCNC: 6.6 NG/ML
CHLORIDE SERPL-SCNC: 100 MMOL/L (ref 97–108)
CHLORIDE SERPL-SCNC: 101 MMOL/L (ref 97–108)
CHLORIDE SERPL-SCNC: 102 MMOL/L (ref 97–108)
CHLORIDE SERPL-SCNC: 103 MMOL/L (ref 97–108)
CHLORIDE SERPL-SCNC: 104 MMOL/L (ref 97–108)
CHLORIDE SERPL-SCNC: 105 MMOL/L (ref 97–108)
CHLORIDE SERPL-SCNC: 105 MMOL/L (ref 97–108)
CHLORIDE SERPL-SCNC: 106 MMOL/L (ref 97–108)
CHLORIDE SERPL-SCNC: 107 MMOL/L (ref 97–108)
CHLORIDE SERPL-SCNC: 109 MMOL/L (ref 97–108)
CHLORIDE SERPL-SCNC: 110 MMOL/L (ref 97–108)
CHLORIDE SERPL-SCNC: 111 MMOL/L (ref 97–108)
CHLORIDE SERPL-SCNC: 112 MMOL/L (ref 97–108)
CHLORIDE SERPL-SCNC: 87 MMOL/L (ref 97–108)
CHLORIDE SERPL-SCNC: 90 MMOL/L (ref 97–108)
CHLORIDE SERPL-SCNC: 91 MMOL/L (ref 97–108)
CHLORIDE SERPL-SCNC: 92 MMOL/L (ref 97–108)
CHLORIDE SERPL-SCNC: 93 MMOL/L (ref 97–108)
CHLORIDE SERPL-SCNC: 94 MMOL/L (ref 97–108)
CHLORIDE SERPL-SCNC: 95 MMOL/L (ref 97–108)
CHLORIDE SERPL-SCNC: 96 MMOL/L (ref 97–108)
CHLORIDE SERPL-SCNC: 97 MMOL/L (ref 97–108)
CHLORIDE SERPL-SCNC: 98 MMOL/L (ref 97–108)
CHLORIDE SERPL-SCNC: 99 MMOL/L (ref 97–108)
CHOLEST SERPL-MCNC: 90 MG/DL
CK MB CFR SERPL CALC: 7.8 % (ref 0–2.5)
CK MB CFR SERPL CALC: 8.7 % (ref 0–2.5)
CK MB SERPL-MCNC: 1.8 NG/ML (ref 5–25)
CK MB SERPL-MCNC: 2 NG/ML (ref 5–25)
CK SERPL-CCNC: 23 U/L (ref 39–308)
CK SERPL-CCNC: 51 U/L (ref 39–308)
CO2 SERPL-SCNC: 26 MMOL/L (ref 21–32)
CO2 SERPL-SCNC: 27 MMOL/L (ref 21–32)
CO2 SERPL-SCNC: 27 MMOL/L (ref 21–32)
CO2 SERPL-SCNC: 28 MMOL/L (ref 21–32)
CO2 SERPL-SCNC: 29 MMOL/L (ref 21–32)
CO2 SERPL-SCNC: 30 MMOL/L (ref 21–32)
CO2 SERPL-SCNC: 31 MMOL/L (ref 21–32)
CO2 SERPL-SCNC: 32 MMOL/L (ref 21–32)
CO2 SERPL-SCNC: 33 MMOL/L (ref 21–32)
CO2 SERPL-SCNC: 34 MMOL/L (ref 21–32)
CO2 SERPL-SCNC: 35 MMOL/L (ref 21–32)
CO2 SERPL-SCNC: 36 MMOL/L (ref 21–32)
CO2 SERPL-SCNC: 37 MMOL/L (ref 21–32)
CO2 SERPL-SCNC: 38 MMOL/L (ref 21–32)
COCAINE UR QL SCN: NEGATIVE
COLOR UR: ABNORMAL
COMMENT, HOLDF: NORMAL
CORTIS SERPL-MCNC: 12.9 UG/DL
CORTIS SERPL-MCNC: 22.4 UG/DL
COTININE UR QL SCN: NEGATIVE NG/ML
CREAT SERPL-MCNC: 0.97 MG/DL (ref 0.7–1.3)
CREAT SERPL-MCNC: 1.03 MG/DL (ref 0.7–1.3)
CREAT SERPL-MCNC: 1.04 MG/DL (ref 0.7–1.3)
CREAT SERPL-MCNC: 1.05 MG/DL (ref 0.7–1.3)
CREAT SERPL-MCNC: 1.07 MG/DL (ref 0.7–1.3)
CREAT SERPL-MCNC: 1.08 MG/DL (ref 0.7–1.3)
CREAT SERPL-MCNC: 1.08 MG/DL (ref 0.7–1.3)
CREAT SERPL-MCNC: 1.1 MG/DL (ref 0.7–1.3)
CREAT SERPL-MCNC: 1.12 MG/DL (ref 0.7–1.3)
CREAT SERPL-MCNC: 1.13 MG/DL (ref 0.7–1.3)
CREAT SERPL-MCNC: 1.15 MG/DL (ref 0.7–1.3)
CREAT SERPL-MCNC: 1.16 MG/DL (ref 0.7–1.3)
CREAT SERPL-MCNC: 1.17 MG/DL (ref 0.7–1.3)
CREAT SERPL-MCNC: 1.19 MG/DL (ref 0.7–1.3)
CREAT SERPL-MCNC: 1.19 MG/DL (ref 0.7–1.3)
CREAT SERPL-MCNC: 1.2 MG/DL (ref 0.7–1.3)
CREAT SERPL-MCNC: 1.2 MG/DL (ref 0.7–1.3)
CREAT SERPL-MCNC: 1.22 MG/DL (ref 0.7–1.3)
CREAT SERPL-MCNC: 1.24 MG/DL (ref 0.7–1.3)
CREAT SERPL-MCNC: 1.25 MG/DL (ref 0.7–1.3)
CREAT SERPL-MCNC: 1.26 MG/DL (ref 0.7–1.3)
CREAT SERPL-MCNC: 1.26 MG/DL (ref 0.7–1.3)
CREAT SERPL-MCNC: 1.28 MG/DL (ref 0.7–1.3)
CREAT SERPL-MCNC: 1.28 MG/DL (ref 0.7–1.3)
CREAT SERPL-MCNC: 1.3 MG/DL (ref 0.7–1.3)
CREAT SERPL-MCNC: 1.3 MG/DL (ref 0.7–1.3)
CREAT SERPL-MCNC: 1.31 MG/DL (ref 0.7–1.3)
CREAT SERPL-MCNC: 1.33 MG/DL (ref 0.7–1.3)
CREAT SERPL-MCNC: 1.35 MG/DL (ref 0.7–1.3)
CREAT SERPL-MCNC: 1.35 MG/DL (ref 0.7–1.3)
CREAT SERPL-MCNC: 1.36 MG/DL (ref 0.7–1.3)
CREAT SERPL-MCNC: 1.4 MG/DL (ref 0.7–1.3)
CREAT SERPL-MCNC: 1.43 MG/DL (ref 0.7–1.3)
CREAT SERPL-MCNC: 1.52 MG/DL (ref 0.7–1.3)
CREAT SERPL-MCNC: 1.52 MG/DL (ref 0.7–1.3)
CREAT SERPL-MCNC: 1.55 MG/DL (ref 0.7–1.3)
CREAT SERPL-MCNC: 1.57 MG/DL (ref 0.7–1.3)
CREAT SERPL-MCNC: 1.59 MG/DL (ref 0.7–1.3)
CREAT SERPL-MCNC: 1.61 MG/DL (ref 0.7–1.3)
CREAT SERPL-MCNC: 1.65 MG/DL (ref 0.7–1.3)
CREAT SERPL-MCNC: 1.68 MG/DL (ref 0.7–1.3)
CREAT SERPL-MCNC: 1.69 MG/DL (ref 0.7–1.3)
CREAT SERPL-MCNC: 1.7 MG/DL (ref 0.7–1.3)
CREAT SERPL-MCNC: 1.75 MG/DL (ref 0.7–1.3)
CREAT SERPL-MCNC: 1.78 MG/DL (ref 0.7–1.3)
CREAT SERPL-MCNC: 1.79 MG/DL (ref 0.7–1.3)
CREAT SERPL-MCNC: 1.79 MG/DL (ref 0.7–1.3)
CREAT SERPL-MCNC: 1.8 MG/DL (ref 0.7–1.3)
CREAT SERPL-MCNC: 1.86 MG/DL (ref 0.7–1.3)
CREAT SERPL-MCNC: 1.88 MG/DL (ref 0.7–1.3)
CREAT SERPL-MCNC: 1.92 MG/DL (ref 0.7–1.3)
CREAT SERPL-MCNC: 1.92 MG/DL (ref 0.7–1.3)
CREAT SERPL-MCNC: 2.01 MG/DL (ref 0.7–1.3)
CREAT SERPL-MCNC: 2.05 MG/DL (ref 0.7–1.3)
CREAT SERPL-MCNC: 2.09 MG/DL (ref 0.7–1.3)
CREAT SERPL-MCNC: 2.13 MG/DL (ref 0.7–1.3)
CREAT SERPL-MCNC: 2.15 MG/DL (ref 0.7–1.3)
CREAT SERPL-MCNC: 2.18 MG/DL (ref 0.7–1.3)
CREAT SERPL-MCNC: 2.21 MG/DL (ref 0.7–1.3)
CREAT SERPL-MCNC: 2.31 MG/DL (ref 0.7–1.3)
CREAT SERPL-MCNC: 2.38 MG/DL (ref 0.7–1.3)
CREAT SERPL-MCNC: 2.44 MG/DL (ref 0.7–1.3)
CREAT SERPL-MCNC: 2.56 MG/DL (ref 0.7–1.3)
CREAT SERPL-MCNC: 2.72 MG/DL (ref 0.7–1.3)
CREAT SERPL-MCNC: 3.01 MG/DL (ref 0.7–1.3)
CREAT SERPL-MCNC: 3.08 MG/DL (ref 0.7–1.3)
CREAT SERPL-MCNC: 3.43 MG/DL (ref 0.7–1.3)
CREAT SERPL-MCNC: 3.46 MG/DL (ref 0.7–1.3)
CREAT UR-MCNC: 110 MG/DL
CROSSMATCH RESULT,%XM: NORMAL
CRP SERPL HS-MCNC: >9.5 MG/L
D50 ADMINISTERED, D50ADM: 0 ML
D50 ADMINISTERED, D50ADM: 8 ML
D50 ADMINISTERED, D50ADM: NORMAL ML
D50 ORDER, D50ORD: 0 ML
D50 ORDER, D50ORD: 10 ML
D50 ORDER, D50ORD: 8 ML
D50 ORDER, D50ORD: 8 ML
D50 ORDER, D50ORD: NORMAL ML
DAT IGG-SP REAG RBC QL: NORMAL
DAT POLY-SP REAG RBC QL: NORMAL
DIAGNOSIS, 93000: NORMAL
DIFFERENTIAL METHOD BLD: ABNORMAL
DIGOXIN SERPL-MCNC: 0.4 NG/ML (ref 0.9–2)
DIGOXIN SERPL-MCNC: 0.7 NG/ML (ref 0.9–2)
DIGOXIN SERPL-MCNC: 0.7 NG/ML (ref 0.9–2)
DIGOXIN SERPL-MCNC: 0.8 NG/ML (ref 0.9–2)
DIGOXIN SERPL-MCNC: 0.9 NG/ML (ref 0.9–2)
DIGOXIN SERPL-MCNC: 1 NG/ML (ref 0.9–2)
DIGOXIN SERPL-MCNC: 1.2 NG/ML (ref 0.9–2)
DIGOXIN SERPL-MCNC: 1.3 NG/ML (ref 0.9–2)
DIGOXIN SERPL-MCNC: 1.4 NG/ML (ref 0.9–2)
DIGOXIN SERPL-MCNC: 1.4 NG/ML (ref 0.9–2)
DIGOXIN SERPL-MCNC: 1.7 NG/ML (ref 0.9–2)
DIGOXIN SERPL-MCNC: 1.7 NG/ML (ref 0.9–2)
DIGOXIN SERPL-MCNC: 1.9 NG/ML (ref 0.9–2)
DIGOXIN SERPL-MCNC: 2 NG/ML (ref 0.9–2)
DIGOXIN SERPL-MCNC: 2.4 NG/ML (ref 0.9–2)
DIGOXIN SERPL-MCNC: 2.6 NG/ML (ref 0.9–2)
DIGOXIN SERPL-MCNC: <0.2 NG/ML (ref 0.9–2)
DISCLAIMER:, 130261: ABNORMAL
DISCLAIMER:, 130261: NORMAL
DRUG SCREEN COMMENT:, 753798: NORMAL
DRUG SCRN COMMENT,DRGCM: NORMAL
DRVVT MIX, 117894: 72 SEC (ref 0–47)
DRVVT RATIO 500585: 1.1 RATIO (ref 0.8–1.2)
ECHO AO ROOT DIAM: 3.63 CM
ECHO AV CUSP MM: 2.22 CM
ECHO AV PEAK GRADIENT: 4.7 MMHG
ECHO AV PEAK VELOCITY: 108.73 CM/S
ECHO EST RA PRESSURE: 10 MMHG
ECHO EST RA PRESSURE: 15 MMHG
ECHO LA AREA 4C: 26 CM2
ECHO LA AREA 4C: 28.2 CM2
ECHO LA AREA 4C: 29.8 CM2
ECHO LA MAJOR AXIS: 4.68 CM
ECHO LA MAJOR AXIS: 4.97 CM
ECHO LA MAJOR AXIS: 5.09 CM
ECHO LA TO AORTIC ROOT RATIO: 1.37
ECHO LA VOL 2C: 114.76 ML (ref 18–58)
ECHO LA VOL 2C: 122.31 ML (ref 18–58)
ECHO LA VOL 2C: 89.85 ML (ref 18–58)
ECHO LA VOL 4C: 104.55 ML (ref 18–58)
ECHO LA VOL 4C: 79.27 ML (ref 18–58)
ECHO LA VOL 4C: 91.99 ML (ref 18–58)
ECHO LA VOL BP: 102.28 ML (ref 18–58)
ECHO LA VOL BP: 106.99 ML (ref 18–58)
ECHO LA VOL BP: 114.22 ML (ref 18–58)
ECHO LA VOL/BSA BIPLANE: 51.1 ML/M2 (ref 16–28)
ECHO LA VOL/BSA BIPLANE: 51.92 ML/M2 (ref 16–28)
ECHO LA VOL/BSA BIPLANE: 54.28 ML/M2 (ref 16–28)
ECHO LA VOLUME INDEX A2C: 42.92 ML/M2 (ref 16–28)
ECHO LA VOLUME INDEX A2C: 58.12 ML/M2 (ref 16–28)
ECHO LA VOLUME INDEX A2C: 58.26 ML/M2 (ref 16–28)
ECHO LA VOLUME INDEX A4C: 40.24 ML/M2 (ref 16–28)
ECHO LA VOLUME INDEX A4C: 43.72 ML/M2 (ref 16–28)
ECHO LA VOLUME INDEX A4C: 49.94 ML/M2 (ref 16–28)
ECHO LV E' LATERAL VELOCITY: 7.87 CM/S
ECHO LV E' LATERAL VELOCITY: 7.91 CM/S
ECHO LV E' LATERAL VELOCITY: 7.98 CM/S
ECHO LV E' SEPTAL VELOCITY: 3.32 CM/S
ECHO LV E' SEPTAL VELOCITY: 3.62 CM/S
ECHO LV E' SEPTAL VELOCITY: 7.5 CM/S
ECHO LV EDV A2C: 287 ML
ECHO LV EDV A4C: 209.9 ML
ECHO LV EDV BP: 259.2 ML (ref 67–155)
ECHO LV EDV INDEX A4C: 99.7 ML/M2
ECHO LV EDV INDEX BP: 123.2 ML/M2
ECHO LV EDV NDEX A2C: 136.4 ML/M2
ECHO LV EJECTION FRACTION A2C: 22 %
ECHO LV EJECTION FRACTION A4C: 17 %
ECHO LV EJECTION FRACTION BIPLANE: 21.3 % (ref 55–100)
ECHO LV ESV A2C: 223.2 ML
ECHO LV ESV A4C: 173.6 ML
ECHO LV ESV BP: 204 ML (ref 22–58)
ECHO LV ESV INDEX A2C: 106.1 ML/M2
ECHO LV ESV INDEX A4C: 82.5 ML/M2
ECHO LV ESV INDEX BP: 96.9 ML/M2
ECHO LV INTERNAL DIMENSION DIASTOLIC: 5.49 CM (ref 4.2–5.9)
ECHO LV INTERNAL DIMENSION DIASTOLIC: 6.07 CM (ref 4.2–5.9)
ECHO LV INTERNAL DIMENSION DIASTOLIC: 6.52 CM (ref 4.2–5.9)
ECHO LV INTERNAL DIMENSION DIASTOLIC: 6.99 CM (ref 4.2–5.9)
ECHO LV INTERNAL DIMENSION DIASTOLIC: 7.36 CM (ref 4.2–5.9)
ECHO LV INTERNAL DIMENSION DIASTOLIC: 7.37 CM (ref 4.2–5.9)
ECHO LV INTERNAL DIMENSION DIASTOLIC: 7.57 CM (ref 4.2–5.9)
ECHO LV INTERNAL DIMENSION DIASTOLIC: 8.21 CM (ref 4.2–5.9)
ECHO LV INTERNAL DIMENSION SYSTOLIC: 4.66 CM
ECHO LV INTERNAL DIMENSION SYSTOLIC: 4.79 CM
ECHO LV INTERNAL DIMENSION SYSTOLIC: 6.18 CM
ECHO LV INTERNAL DIMENSION SYSTOLIC: 6.35 CM
ECHO LV INTERNAL DIMENSION SYSTOLIC: 6.47 CM
ECHO LV INTERNAL DIMENSION SYSTOLIC: 6.76 CM
ECHO LV INTERNAL DIMENSION SYSTOLIC: 7.23 CM
ECHO LV INTERNAL DIMENSION SYSTOLIC: 7.63 CM
ECHO LV IVSD: 0.83 CM (ref 0.6–1)
ECHO LV IVSD: 0.84 CM (ref 0.6–1)
ECHO LV IVSD: 0.88 CM (ref 0.6–1)
ECHO LV IVSD: 0.92 CM (ref 0.6–1)
ECHO LV IVSD: 0.93 CM (ref 0.6–1)
ECHO LV IVSD: 1.01 CM (ref 0.6–1)
ECHO LV MASS 2D: 252 G (ref 88–224)
ECHO LV MASS 2D: 283 G (ref 88–224)
ECHO LV MASS 2D: 340.7 G (ref 88–224)
ECHO LV MASS 2D: 409.1 G (ref 88–224)
ECHO LV MASS 2D: 441.3 G (ref 88–224)
ECHO LV MASS 2D: 465.2 G (ref 88–224)
ECHO LV MASS INDEX 2D: 115.3 G/M2 (ref 49–115)
ECHO LV MASS INDEX 2D: 132.6 G/M2 (ref 49–115)
ECHO LV MASS INDEX 2D: 155.1 G/M2 (ref 49–115)
ECHO LV MASS INDEX 2D: 194.4 G/M2 (ref 49–115)
ECHO LV MASS INDEX 2D: 210.8 G/M2 (ref 49–115)
ECHO LV MASS INDEX 2D: 236.2 G/M2 (ref 49–115)
ECHO LV POSTERIOR WALL DIASTOLIC: 0.75 CM (ref 0.6–1)
ECHO LV POSTERIOR WALL DIASTOLIC: 1 CM (ref 0.6–1)
ECHO LV POSTERIOR WALL DIASTOLIC: 1.05 CM (ref 0.6–1)
ECHO LV POSTERIOR WALL DIASTOLIC: 1.17 CM (ref 0.6–1)
ECHO LV POSTERIOR WALL DIASTOLIC: 1.25 CM (ref 0.6–1)
ECHO LV POSTERIOR WALL DIASTOLIC: 1.32 CM (ref 0.6–1)
ECHO LVOT PEAK GRADIENT: 1.1 MMHG
ECHO LVOT PEAK VELOCITY: 52.17 CM/S
ECHO MV A VELOCITY: 29.53 CM/S
ECHO MV A VELOCITY: 30.63 CM/S
ECHO MV AREA PHT: 3.5 CM2
ECHO MV AREA PHT: 3.8 CM2
ECHO MV E DECELERATION TIME (DT): 197.4 MS
ECHO MV E DECELERATION TIME (DT): 214.2 MS
ECHO MV E VELOCITY: 83.01 CM/S
ECHO MV E VELOCITY: 87.31 CM/S
ECHO MV E VELOCITY: 95.32 CM/S
ECHO MV E/A RATIO: 2.85
ECHO MV E/A RATIO: 3.23
ECHO MV E/E' LATERAL: 10.55
ECHO MV E/E' LATERAL: 10.94
ECHO MV E/E' LATERAL: 12.05
ECHO MV E/E' RATIO (AVERAGED): 10.81
ECHO MV E/E' RATIO (AVERAGED): 18.62
ECHO MV E/E' RATIO (AVERAGED): 19.19
ECHO MV E/E' SEPTAL: 11.07
ECHO MV E/E' SEPTAL: 26.3
ECHO MV E/E' SEPTAL: 26.33
ECHO MV PRESSURE HALF TIME (PHT): 57.2 MS
ECHO MV PRESSURE HALF TIME (PHT): 62.1 MS
ECHO MV REGURGITANT PEAK GRADIENT: 67.5 MMHG
ECHO MV REGURGITANT PEAK GRADIENT: 69.4 MMHG
ECHO MV REGURGITANT PEAK VELOCITY: 410.81 CM/S
ECHO MV REGURGITANT PEAK VELOCITY: 416.67 CM/S
ECHO PULMONARY ARTERY SYSTOLIC PRESSURE (PASP): 39 MMHG
ECHO PULMONARY ARTERY SYSTOLIC PRESSURE (PASP): 60.9 MMHG
ECHO PULMONARY ARTERY SYSTOLIC PRESSURE (PASP): 72.7 MMHG
ECHO PV MAX VELOCITY: 47.81 CM/S
ECHO PV MAX VELOCITY: 52.11 CM/S
ECHO PV MAX VELOCITY: 90.82 CM/S
ECHO PV PEAK GRADIENT: 0.9 MMHG
ECHO PV PEAK GRADIENT: 1.1 MMHG
ECHO PV PEAK GRADIENT: 3.3 MMHG
ECHO RIGHT VENTRICULAR SYSTOLIC PRESSURE (RVSP): 60.9 MMHG
ECHO RIGHT VENTRICULAR SYSTOLIC PRESSURE (RVSP): 72.7 MMHG
ECHO RV INTERNAL DIMENSION: 2.14 CM
ECHO RV INTERNAL DIMENSION: 2.79 CM
ECHO RV INTERNAL DIMENSION: 3.58 CM
ECHO RV INTERNAL DIMENSION: 4.43 CM
ECHO RV INTERNAL DIMENSION: 5.4 CM
ECHO RV INTERNAL DIMENSION: 5.84 CM
ECHO RV TAPSE: 0.84 CM (ref 1.5–2)
ECHO RV TAPSE: 1.1 CM (ref 1.5–2)
ECHO RV TAPSE: 1.26 CM (ref 1.5–2)
ECHO RV TAPSE: 1.4 CM (ref 1.5–2)
ECHO RV TAPSE: 1.58 CM (ref 1.5–2)
ECHO RV TAPSE: 1.73 CM (ref 1.5–2)
ECHO RV TAPSE: 1.73 CM (ref 1.5–2)
ECHO RV TAPSE: 1.8 CM (ref 1.5–2)
ECHO TV REGURGITANT MAX VELOCITY: 248.19 CM/S
ECHO TV REGURGITANT MAX VELOCITY: 258.85 CM/S
ECHO TV REGURGITANT MAX VELOCITY: 287.03 CM/S
ECHO TV REGURGITANT MAX VELOCITY: 317.7 CM/S
ECHO TV REGURGITANT MAX VELOCITY: 344.12 CM/S
ECHO TV REGURGITANT MAX VELOCITY: 356.73 CM/S
ECHO TV REGURGITANT MAX VELOCITY: 379.65 CM/S
ECHO TV REGURGITANT PEAK GRADIENT: 24.6 MMHG
ECHO TV REGURGITANT PEAK GRADIENT: 26.8 MMHG
ECHO TV REGURGITANT PEAK GRADIENT: 33 MMHG
ECHO TV REGURGITANT PEAK GRADIENT: 40.4 MMHG
ECHO TV REGURGITANT PEAK GRADIENT: 47.4 MMHG
ECHO TV REGURGITANT PEAK GRADIENT: 50.9 MMHG
ECHO TV REGURGITANT PEAK GRADIENT: 57.7 MMHG
END DIASTOLIC PRESSURE: 16
EOSINOPHIL # BLD: 0 K/UL (ref 0–0.4)
EOSINOPHIL #/AREA URNS HPF: NEGATIVE /[HPF]
EOSINOPHIL NFR BLD: 0 % (ref 0–7)
EOSINOPHIL NFR BLD: 0 % (ref 0–7)
EOSINOPHIL NFR BLD: 1 % (ref 0–7)
EPITH CASTS URNS QL MICRO: ABNORMAL /LPF
ERYTHROCYTE [DISTWIDTH] IN BLOOD BY AUTOMATED COUNT: 16.2 % (ref 11.5–14.5)
ERYTHROCYTE [DISTWIDTH] IN BLOOD BY AUTOMATED COUNT: 16.3 % (ref 11.5–14.5)
ERYTHROCYTE [DISTWIDTH] IN BLOOD BY AUTOMATED COUNT: 16.4 % (ref 11.5–14.5)
ERYTHROCYTE [DISTWIDTH] IN BLOOD BY AUTOMATED COUNT: 16.4 % (ref 11.5–14.5)
ERYTHROCYTE [DISTWIDTH] IN BLOOD BY AUTOMATED COUNT: 16.6 % (ref 11.5–14.5)
ERYTHROCYTE [DISTWIDTH] IN BLOOD BY AUTOMATED COUNT: 16.6 % (ref 11.5–14.5)
ERYTHROCYTE [DISTWIDTH] IN BLOOD BY AUTOMATED COUNT: 16.7 % (ref 11.5–14.5)
ERYTHROCYTE [DISTWIDTH] IN BLOOD BY AUTOMATED COUNT: 16.8 % (ref 11.5–14.5)
ERYTHROCYTE [DISTWIDTH] IN BLOOD BY AUTOMATED COUNT: 16.8 % (ref 11.5–14.5)
ERYTHROCYTE [DISTWIDTH] IN BLOOD BY AUTOMATED COUNT: 17.1 % (ref 11.5–14.5)
ERYTHROCYTE [DISTWIDTH] IN BLOOD BY AUTOMATED COUNT: 17.2 % (ref 11.5–14.5)
ERYTHROCYTE [DISTWIDTH] IN BLOOD BY AUTOMATED COUNT: 17.3 % (ref 11.5–14.5)
ERYTHROCYTE [DISTWIDTH] IN BLOOD BY AUTOMATED COUNT: 17.4 % (ref 11.5–14.5)
ERYTHROCYTE [DISTWIDTH] IN BLOOD BY AUTOMATED COUNT: 17.5 % (ref 11.5–14.5)
ERYTHROCYTE [DISTWIDTH] IN BLOOD BY AUTOMATED COUNT: 17.6 % (ref 11.5–14.5)
ERYTHROCYTE [DISTWIDTH] IN BLOOD BY AUTOMATED COUNT: 17.8 % (ref 11.5–14.5)
ERYTHROCYTE [DISTWIDTH] IN BLOOD BY AUTOMATED COUNT: 17.9 % (ref 11.5–14.5)
ERYTHROCYTE [DISTWIDTH] IN BLOOD BY AUTOMATED COUNT: 18 % (ref 11.5–14.5)
ERYTHROCYTE [DISTWIDTH] IN BLOOD BY AUTOMATED COUNT: 18 % (ref 11.5–14.5)
ERYTHROCYTE [DISTWIDTH] IN BLOOD BY AUTOMATED COUNT: 18.1 % (ref 11.5–14.5)
ERYTHROCYTE [DISTWIDTH] IN BLOOD BY AUTOMATED COUNT: 18.1 % (ref 11.5–14.5)
ERYTHROCYTE [DISTWIDTH] IN BLOOD BY AUTOMATED COUNT: 18.2 % (ref 11.5–14.5)
ERYTHROCYTE [DISTWIDTH] IN BLOOD BY AUTOMATED COUNT: 18.2 % (ref 11.5–14.5)
ERYTHROCYTE [DISTWIDTH] IN BLOOD BY AUTOMATED COUNT: 18.3 % (ref 11.5–14.5)
ERYTHROCYTE [DISTWIDTH] IN BLOOD BY AUTOMATED COUNT: 18.3 % (ref 11.5–14.5)
ERYTHROCYTE [DISTWIDTH] IN BLOOD BY AUTOMATED COUNT: 18.4 % (ref 11.5–14.5)
ERYTHROCYTE [DISTWIDTH] IN BLOOD BY AUTOMATED COUNT: 18.5 % (ref 11.5–14.5)
ERYTHROCYTE [DISTWIDTH] IN BLOOD BY AUTOMATED COUNT: 18.6 % (ref 11.5–14.5)
ERYTHROCYTE [DISTWIDTH] IN BLOOD BY AUTOMATED COUNT: 18.6 % (ref 11.5–14.5)
ERYTHROCYTE [DISTWIDTH] IN BLOOD BY AUTOMATED COUNT: 18.7 % (ref 11.5–14.5)
ERYTHROCYTE [DISTWIDTH] IN BLOOD BY AUTOMATED COUNT: 18.7 % (ref 11.5–14.5)
ERYTHROCYTE [DISTWIDTH] IN BLOOD BY AUTOMATED COUNT: 18.8 % (ref 11.5–14.5)
ERYTHROCYTE [DISTWIDTH] IN BLOOD BY AUTOMATED COUNT: 18.9 % (ref 11.5–14.5)
ERYTHROCYTE [DISTWIDTH] IN BLOOD BY AUTOMATED COUNT: 19.2 % (ref 11.5–14.5)
ERYTHROCYTE [DISTWIDTH] IN BLOOD BY AUTOMATED COUNT: 19.2 % (ref 11.5–14.5)
ERYTHROCYTE [DISTWIDTH] IN BLOOD BY AUTOMATED COUNT: 19.3 % (ref 11.5–14.5)
ERYTHROCYTE [DISTWIDTH] IN BLOOD BY AUTOMATED COUNT: 19.4 % (ref 11.5–14.5)
ERYTHROCYTE [DISTWIDTH] IN BLOOD BY AUTOMATED COUNT: 19.4 % (ref 11.5–14.5)
ERYTHROCYTE [DISTWIDTH] IN BLOOD BY AUTOMATED COUNT: 19.7 % (ref 11.5–14.5)
ERYTHROCYTE [DISTWIDTH] IN BLOOD BY AUTOMATED COUNT: 19.7 % (ref 11.5–14.5)
ERYTHROCYTE [DISTWIDTH] IN BLOOD BY AUTOMATED COUNT: 19.9 % (ref 11.5–14.5)
ERYTHROCYTE [DISTWIDTH] IN BLOOD BY AUTOMATED COUNT: 19.9 % (ref 11.5–14.5)
ERYTHROCYTE [DISTWIDTH] IN BLOOD BY AUTOMATED COUNT: 20 % (ref 11.5–14.5)
ERYTHROCYTE [DISTWIDTH] IN BLOOD BY AUTOMATED COUNT: 20.1 % (ref 11.5–14.5)
ERYTHROCYTE [DISTWIDTH] IN BLOOD BY AUTOMATED COUNT: 20.6 % (ref 11.5–14.5)
ERYTHROCYTE [DISTWIDTH] IN BLOOD BY AUTOMATED COUNT: 20.8 % (ref 11.5–14.5)
ERYTHROCYTE [DISTWIDTH] IN BLOOD BY AUTOMATED COUNT: 21 % (ref 11.5–14.5)
ERYTHROCYTE [DISTWIDTH] IN BLOOD BY AUTOMATED COUNT: 21.2 % (ref 11.5–14.5)
ERYTHROCYTE [DISTWIDTH] IN BLOOD BY AUTOMATED COUNT: 21.3 % (ref 11.5–14.5)
ERYTHROCYTE [DISTWIDTH] IN BLOOD BY AUTOMATED COUNT: 21.6 % (ref 11.5–14.5)
ERYTHROCYTE [DISTWIDTH] IN BLOOD BY AUTOMATED COUNT: 21.7 % (ref 11.5–14.5)
ERYTHROCYTE [DISTWIDTH] IN BLOOD BY AUTOMATED COUNT: 21.7 % (ref 11.5–14.5)
ERYTHROCYTE [DISTWIDTH] IN BLOOD BY AUTOMATED COUNT: 22 % (ref 11.5–14.5)
ERYTHROCYTE [DISTWIDTH] IN BLOOD BY AUTOMATED COUNT: 22.3 % (ref 11.5–14.5)
ERYTHROCYTE [SEDIMENTATION RATE] IN BLOOD: 30 MM/HR (ref 0–20)
ERYTHROCYTE [SEDIMENTATION RATE] IN BLOOD: 61 MM/HR (ref 0–20)
ERYTHROCYTE [SEDIMENTATION RATE] IN BLOOD: 63 MM/HR (ref 0–20)
EST. AVERAGE GLUCOSE BLD GHB EST-MCNC: 143 MG/DL
F2 GENE MUT ANL BLD/T: NORMAL
FACT VIII ACT/NOR PPP: 323 % (ref 56–140)
FERRITIN SERPL-MCNC: 100 NG/ML (ref 26–388)
FERRITIN SERPL-MCNC: 1079 NG/ML (ref 26–388)
FERRITIN SERPL-MCNC: 457 NG/ML (ref 26–388)
FERRITIN SERPL-MCNC: 683 NG/ML (ref 26–388)
G6PD BLD QN: 358 U/10E12 RBC (ref 146–376)
GAS FLOW.O2 O2 DELIVERY SYS: ABNORMAL L/MIN
GAS FLOW.O2 O2 DELIVERY SYS: NORMAL L/MIN
GAS FLOW.O2 O2 DELIVERY SYS: NORMAL L/MIN
GAS FLOW.O2 SETTING OXYMISER: 15 L/M
GAS FLOW.O2 SETTING OXYMISER: 18 BPM
GAS FLOW.O2 SETTING OXYMISER: 2 L/M
GAS FLOW.O2 SETTING OXYMISER: 3 L/M
GAS FLOW.O2 SETTING OXYMISER: 4 L/M
GAS FLOW.O2 SETTING OXYMISER: 4.5 L/M
GAS FLOW.O2 SETTING OXYMISER: 5 L/M
GAS FLOW.O2 SETTING OXYMISER: 6 L/M
GLOBULIN SER CALC-MCNC: 2.2 G/DL (ref 2–4)
GLOBULIN SER CALC-MCNC: 2.2 G/DL (ref 2–4)
GLOBULIN SER CALC-MCNC: 2.3 G/DL (ref 2–4)
GLOBULIN SER CALC-MCNC: 2.5 G/DL (ref 2–4)
GLOBULIN SER CALC-MCNC: 2.8 G/DL (ref 2–4)
GLOBULIN SER CALC-MCNC: 2.9 G/DL (ref 2–4)
GLOBULIN SER CALC-MCNC: 3 G/DL (ref 2–4)
GLOBULIN SER CALC-MCNC: 3.1 G/DL (ref 2–4)
GLOBULIN SER CALC-MCNC: 3.2 G/DL (ref 2–4)
GLOBULIN SER CALC-MCNC: 3.3 G/DL (ref 2–4)
GLOBULIN SER CALC-MCNC: 3.4 G/DL (ref 2–4)
GLOBULIN SER CALC-MCNC: 3.5 G/DL (ref 2–4)
GLOBULIN SER CALC-MCNC: 3.5 G/DL (ref 2–4)
GLOBULIN SER CALC-MCNC: 3.6 G/DL (ref 2–4)
GLOBULIN SER CALC-MCNC: 3.7 G/DL (ref 2–4)
GLOBULIN SER CALC-MCNC: 3.8 G/DL (ref 2–4)
GLOBULIN SER CALC-MCNC: 3.9 G/DL (ref 2–4)
GLOBULIN SER CALC-MCNC: 4 G/DL (ref 2–4)
GLOBULIN SER CALC-MCNC: 4.1 G/DL (ref 2–4)
GLOBULIN SER CALC-MCNC: 4.2 G/DL (ref 2–4)
GLOBULIN SER CALC-MCNC: 4.3 G/DL (ref 2–4)
GLOBULIN SER CALC-MCNC: 4.5 G/DL (ref 2–4)
GLSCOM COMMENTS: NORMAL
GLUCOSE BLD STRIP.AUTO-MCNC: 100 MG/DL (ref 65–100)
GLUCOSE BLD STRIP.AUTO-MCNC: 101 MG/DL (ref 65–100)
GLUCOSE BLD STRIP.AUTO-MCNC: 102 MG/DL (ref 65–100)
GLUCOSE BLD STRIP.AUTO-MCNC: 102 MG/DL (ref 65–100)
GLUCOSE BLD STRIP.AUTO-MCNC: 103 MG/DL (ref 65–100)
GLUCOSE BLD STRIP.AUTO-MCNC: 104 MG/DL (ref 65–100)
GLUCOSE BLD STRIP.AUTO-MCNC: 105 MG/DL (ref 65–100)
GLUCOSE BLD STRIP.AUTO-MCNC: 106 MG/DL (ref 65–100)
GLUCOSE BLD STRIP.AUTO-MCNC: 107 MG/DL (ref 65–100)
GLUCOSE BLD STRIP.AUTO-MCNC: 108 MG/DL (ref 65–100)
GLUCOSE BLD STRIP.AUTO-MCNC: 109 MG/DL (ref 65–100)
GLUCOSE BLD STRIP.AUTO-MCNC: 110 MG/DL (ref 65–100)
GLUCOSE BLD STRIP.AUTO-MCNC: 111 MG/DL (ref 65–100)
GLUCOSE BLD STRIP.AUTO-MCNC: 112 MG/DL (ref 65–100)
GLUCOSE BLD STRIP.AUTO-MCNC: 113 MG/DL (ref 65–100)
GLUCOSE BLD STRIP.AUTO-MCNC: 114 MG/DL (ref 65–100)
GLUCOSE BLD STRIP.AUTO-MCNC: 115 MG/DL (ref 65–100)
GLUCOSE BLD STRIP.AUTO-MCNC: 116 MG/DL (ref 65–100)
GLUCOSE BLD STRIP.AUTO-MCNC: 117 MG/DL (ref 65–100)
GLUCOSE BLD STRIP.AUTO-MCNC: 118 MG/DL (ref 65–100)
GLUCOSE BLD STRIP.AUTO-MCNC: 119 MG/DL (ref 65–100)
GLUCOSE BLD STRIP.AUTO-MCNC: 120 MG/DL (ref 65–100)
GLUCOSE BLD STRIP.AUTO-MCNC: 121 MG/DL (ref 65–100)
GLUCOSE BLD STRIP.AUTO-MCNC: 122 MG/DL (ref 65–100)
GLUCOSE BLD STRIP.AUTO-MCNC: 123 MG/DL (ref 65–100)
GLUCOSE BLD STRIP.AUTO-MCNC: 124 MG/DL (ref 65–100)
GLUCOSE BLD STRIP.AUTO-MCNC: 125 MG/DL (ref 65–100)
GLUCOSE BLD STRIP.AUTO-MCNC: 125 MG/DL (ref 65–100)
GLUCOSE BLD STRIP.AUTO-MCNC: 126 MG/DL (ref 65–100)
GLUCOSE BLD STRIP.AUTO-MCNC: 127 MG/DL (ref 65–100)
GLUCOSE BLD STRIP.AUTO-MCNC: 128 MG/DL (ref 65–100)
GLUCOSE BLD STRIP.AUTO-MCNC: 129 MG/DL (ref 65–100)
GLUCOSE BLD STRIP.AUTO-MCNC: 130 MG/DL (ref 65–100)
GLUCOSE BLD STRIP.AUTO-MCNC: 131 MG/DL (ref 65–100)
GLUCOSE BLD STRIP.AUTO-MCNC: 132 MG/DL (ref 65–100)
GLUCOSE BLD STRIP.AUTO-MCNC: 132 MG/DL (ref 65–100)
GLUCOSE BLD STRIP.AUTO-MCNC: 133 MG/DL (ref 65–100)
GLUCOSE BLD STRIP.AUTO-MCNC: 133 MG/DL (ref 65–100)
GLUCOSE BLD STRIP.AUTO-MCNC: 134 MG/DL (ref 65–100)
GLUCOSE BLD STRIP.AUTO-MCNC: 135 MG/DL (ref 65–100)
GLUCOSE BLD STRIP.AUTO-MCNC: 136 MG/DL (ref 65–100)
GLUCOSE BLD STRIP.AUTO-MCNC: 137 MG/DL (ref 65–100)
GLUCOSE BLD STRIP.AUTO-MCNC: 138 MG/DL (ref 65–100)
GLUCOSE BLD STRIP.AUTO-MCNC: 138 MG/DL (ref 65–100)
GLUCOSE BLD STRIP.AUTO-MCNC: 139 MG/DL (ref 65–100)
GLUCOSE BLD STRIP.AUTO-MCNC: 140 MG/DL (ref 65–100)
GLUCOSE BLD STRIP.AUTO-MCNC: 140 MG/DL (ref 65–100)
GLUCOSE BLD STRIP.AUTO-MCNC: 141 MG/DL (ref 65–100)
GLUCOSE BLD STRIP.AUTO-MCNC: 142 MG/DL (ref 65–100)
GLUCOSE BLD STRIP.AUTO-MCNC: 142 MG/DL (ref 65–100)
GLUCOSE BLD STRIP.AUTO-MCNC: 143 MG/DL (ref 65–100)
GLUCOSE BLD STRIP.AUTO-MCNC: 144 MG/DL (ref 65–100)
GLUCOSE BLD STRIP.AUTO-MCNC: 145 MG/DL (ref 65–100)
GLUCOSE BLD STRIP.AUTO-MCNC: 145 MG/DL (ref 65–100)
GLUCOSE BLD STRIP.AUTO-MCNC: 146 MG/DL (ref 65–100)
GLUCOSE BLD STRIP.AUTO-MCNC: 147 MG/DL (ref 65–100)
GLUCOSE BLD STRIP.AUTO-MCNC: 147 MG/DL (ref 65–100)
GLUCOSE BLD STRIP.AUTO-MCNC: 148 MG/DL (ref 65–100)
GLUCOSE BLD STRIP.AUTO-MCNC: 149 MG/DL (ref 65–100)
GLUCOSE BLD STRIP.AUTO-MCNC: 150 MG/DL (ref 65–100)
GLUCOSE BLD STRIP.AUTO-MCNC: 151 MG/DL (ref 65–100)
GLUCOSE BLD STRIP.AUTO-MCNC: 151 MG/DL (ref 65–100)
GLUCOSE BLD STRIP.AUTO-MCNC: 152 MG/DL (ref 65–100)
GLUCOSE BLD STRIP.AUTO-MCNC: 153 MG/DL (ref 65–100)
GLUCOSE BLD STRIP.AUTO-MCNC: 156 MG/DL (ref 65–100)
GLUCOSE BLD STRIP.AUTO-MCNC: 157 MG/DL (ref 65–100)
GLUCOSE BLD STRIP.AUTO-MCNC: 157 MG/DL (ref 65–100)
GLUCOSE BLD STRIP.AUTO-MCNC: 158 MG/DL (ref 65–100)
GLUCOSE BLD STRIP.AUTO-MCNC: 158 MG/DL (ref 65–100)
GLUCOSE BLD STRIP.AUTO-MCNC: 160 MG/DL (ref 65–100)
GLUCOSE BLD STRIP.AUTO-MCNC: 161 MG/DL (ref 65–100)
GLUCOSE BLD STRIP.AUTO-MCNC: 162 MG/DL (ref 65–100)
GLUCOSE BLD STRIP.AUTO-MCNC: 162 MG/DL (ref 65–100)
GLUCOSE BLD STRIP.AUTO-MCNC: 163 MG/DL (ref 65–100)
GLUCOSE BLD STRIP.AUTO-MCNC: 163 MG/DL (ref 65–100)
GLUCOSE BLD STRIP.AUTO-MCNC: 164 MG/DL (ref 65–100)
GLUCOSE BLD STRIP.AUTO-MCNC: 166 MG/DL (ref 65–100)
GLUCOSE BLD STRIP.AUTO-MCNC: 167 MG/DL (ref 65–100)
GLUCOSE BLD STRIP.AUTO-MCNC: 169 MG/DL (ref 65–100)
GLUCOSE BLD STRIP.AUTO-MCNC: 170 MG/DL (ref 65–100)
GLUCOSE BLD STRIP.AUTO-MCNC: 172 MG/DL (ref 65–100)
GLUCOSE BLD STRIP.AUTO-MCNC: 173 MG/DL (ref 65–100)
GLUCOSE BLD STRIP.AUTO-MCNC: 173 MG/DL (ref 65–100)
GLUCOSE BLD STRIP.AUTO-MCNC: 178 MG/DL (ref 65–100)
GLUCOSE BLD STRIP.AUTO-MCNC: 182 MG/DL (ref 65–100)
GLUCOSE BLD STRIP.AUTO-MCNC: 188 MG/DL (ref 65–100)
GLUCOSE BLD STRIP.AUTO-MCNC: 189 MG/DL (ref 65–100)
GLUCOSE BLD STRIP.AUTO-MCNC: 197 MG/DL (ref 65–100)
GLUCOSE BLD STRIP.AUTO-MCNC: 205 MG/DL (ref 65–100)
GLUCOSE BLD STRIP.AUTO-MCNC: 362 MG/DL (ref 65–100)
GLUCOSE BLD STRIP.AUTO-MCNC: 75 MG/DL (ref 65–100)
GLUCOSE BLD STRIP.AUTO-MCNC: 80 MG/DL (ref 65–100)
GLUCOSE BLD STRIP.AUTO-MCNC: 84 MG/DL (ref 65–100)
GLUCOSE BLD STRIP.AUTO-MCNC: 85 MG/DL (ref 65–100)
GLUCOSE BLD STRIP.AUTO-MCNC: 86 MG/DL (ref 65–100)
GLUCOSE BLD STRIP.AUTO-MCNC: 87 MG/DL (ref 65–100)
GLUCOSE BLD STRIP.AUTO-MCNC: 87 MG/DL (ref 65–100)
GLUCOSE BLD STRIP.AUTO-MCNC: 88 MG/DL (ref 65–100)
GLUCOSE BLD STRIP.AUTO-MCNC: 89 MG/DL (ref 65–100)
GLUCOSE BLD STRIP.AUTO-MCNC: 90 MG/DL (ref 65–100)
GLUCOSE BLD STRIP.AUTO-MCNC: 91 MG/DL (ref 65–100)
GLUCOSE BLD STRIP.AUTO-MCNC: 92 MG/DL (ref 65–100)
GLUCOSE BLD STRIP.AUTO-MCNC: 92 MG/DL (ref 65–100)
GLUCOSE BLD STRIP.AUTO-MCNC: 93 MG/DL (ref 65–100)
GLUCOSE BLD STRIP.AUTO-MCNC: 94 MG/DL (ref 65–100)
GLUCOSE BLD STRIP.AUTO-MCNC: 95 MG/DL (ref 65–100)
GLUCOSE BLD STRIP.AUTO-MCNC: 95 MG/DL (ref 65–100)
GLUCOSE BLD STRIP.AUTO-MCNC: 96 MG/DL (ref 65–100)
GLUCOSE BLD STRIP.AUTO-MCNC: 97 MG/DL (ref 65–100)
GLUCOSE BLD STRIP.AUTO-MCNC: 97 MG/DL (ref 65–100)
GLUCOSE BLD STRIP.AUTO-MCNC: 98 MG/DL (ref 65–100)
GLUCOSE BLD STRIP.AUTO-MCNC: 99 MG/DL (ref 65–100)
GLUCOSE BLD STRIP.AUTO-MCNC: NORMAL MG/DL (ref 65–100)
GLUCOSE BLD STRIP.AUTO-MCNC: NORMAL MG/DL (ref 65–100)
GLUCOSE SERPL-MCNC: 102 MG/DL (ref 65–100)
GLUCOSE SERPL-MCNC: 102 MG/DL (ref 65–100)
GLUCOSE SERPL-MCNC: 103 MG/DL (ref 65–100)
GLUCOSE SERPL-MCNC: 103 MG/DL (ref 65–100)
GLUCOSE SERPL-MCNC: 104 MG/DL (ref 65–100)
GLUCOSE SERPL-MCNC: 105 MG/DL (ref 65–100)
GLUCOSE SERPL-MCNC: 106 MG/DL (ref 65–100)
GLUCOSE SERPL-MCNC: 108 MG/DL (ref 65–100)
GLUCOSE SERPL-MCNC: 109 MG/DL (ref 65–100)
GLUCOSE SERPL-MCNC: 110 MG/DL (ref 65–100)
GLUCOSE SERPL-MCNC: 113 MG/DL (ref 65–100)
GLUCOSE SERPL-MCNC: 113 MG/DL (ref 65–100)
GLUCOSE SERPL-MCNC: 116 MG/DL (ref 65–100)
GLUCOSE SERPL-MCNC: 117 MG/DL (ref 65–100)
GLUCOSE SERPL-MCNC: 118 MG/DL (ref 65–100)
GLUCOSE SERPL-MCNC: 118 MG/DL (ref 65–100)
GLUCOSE SERPL-MCNC: 120 MG/DL (ref 65–100)
GLUCOSE SERPL-MCNC: 123 MG/DL (ref 65–100)
GLUCOSE SERPL-MCNC: 135 MG/DL (ref 65–100)
GLUCOSE SERPL-MCNC: 136 MG/DL (ref 65–100)
GLUCOSE SERPL-MCNC: 141 MG/DL (ref 65–100)
GLUCOSE SERPL-MCNC: 141 MG/DL (ref 65–100)
GLUCOSE SERPL-MCNC: 142 MG/DL (ref 65–100)
GLUCOSE SERPL-MCNC: 145 MG/DL (ref 65–100)
GLUCOSE SERPL-MCNC: 161 MG/DL (ref 65–100)
GLUCOSE SERPL-MCNC: 163 MG/DL (ref 65–100)
GLUCOSE SERPL-MCNC: 178 MG/DL (ref 65–100)
GLUCOSE SERPL-MCNC: 190 MG/DL (ref 65–100)
GLUCOSE SERPL-MCNC: 233 MG/DL (ref 65–100)
GLUCOSE SERPL-MCNC: 259 MG/DL (ref 65–100)
GLUCOSE SERPL-MCNC: 76 MG/DL (ref 65–100)
GLUCOSE SERPL-MCNC: 77 MG/DL (ref 65–100)
GLUCOSE SERPL-MCNC: 77 MG/DL (ref 65–100)
GLUCOSE SERPL-MCNC: 79 MG/DL (ref 65–100)
GLUCOSE SERPL-MCNC: 83 MG/DL (ref 65–100)
GLUCOSE SERPL-MCNC: 84 MG/DL (ref 65–100)
GLUCOSE SERPL-MCNC: 85 MG/DL (ref 65–100)
GLUCOSE SERPL-MCNC: 86 MG/DL (ref 65–100)
GLUCOSE SERPL-MCNC: 87 MG/DL (ref 65–100)
GLUCOSE SERPL-MCNC: 87 MG/DL (ref 65–100)
GLUCOSE SERPL-MCNC: 88 MG/DL (ref 65–100)
GLUCOSE SERPL-MCNC: 89 MG/DL (ref 65–100)
GLUCOSE SERPL-MCNC: 90 MG/DL (ref 65–100)
GLUCOSE SERPL-MCNC: 91 MG/DL (ref 65–100)
GLUCOSE SERPL-MCNC: 91 MG/DL (ref 65–100)
GLUCOSE SERPL-MCNC: 92 MG/DL (ref 65–100)
GLUCOSE SERPL-MCNC: 93 MG/DL (ref 65–100)
GLUCOSE SERPL-MCNC: 95 MG/DL (ref 65–100)
GLUCOSE SERPL-MCNC: 96 MG/DL (ref 65–100)
GLUCOSE SERPL-MCNC: 97 MG/DL (ref 65–100)
GLUCOSE SERPL-MCNC: 98 MG/DL (ref 65–100)
GLUCOSE SERPL-MCNC: 98 MG/DL (ref 65–100)
GLUCOSE SERPL-MCNC: 99 MG/DL (ref 65–100)
GLUCOSE UR STRIP.AUTO-MCNC: 100 MG/DL
GLUCOSE UR STRIP.AUTO-MCNC: NEGATIVE MG/DL
GLUCOSE, GLC: 102 MG/DL
GLUCOSE, GLC: 103 MG/DL
GLUCOSE, GLC: 104 MG/DL
GLUCOSE, GLC: 105 MG/DL
GLUCOSE, GLC: 105 MG/DL
GLUCOSE, GLC: 106 MG/DL
GLUCOSE, GLC: 107 MG/DL
GLUCOSE, GLC: 108 MG/DL
GLUCOSE, GLC: 108 MG/DL
GLUCOSE, GLC: 110 MG/DL
GLUCOSE, GLC: 112 MG/DL
GLUCOSE, GLC: 113 MG/DL
GLUCOSE, GLC: 115 MG/DL
GLUCOSE, GLC: 115 MG/DL
GLUCOSE, GLC: 120 MG/DL
GLUCOSE, GLC: 121 MG/DL
GLUCOSE, GLC: 121 MG/DL
GLUCOSE, GLC: 127 MG/DL
GLUCOSE, GLC: 128 MG/DL
GLUCOSE, GLC: 128 MG/DL
GLUCOSE, GLC: 130 MG/DL
GLUCOSE, GLC: 131 MG/DL
GLUCOSE, GLC: 133 MG/DL
GLUCOSE, GLC: 134 MG/DL
GLUCOSE, GLC: 135 MG/DL
GLUCOSE, GLC: 136 MG/DL
GLUCOSE, GLC: 136 MG/DL
GLUCOSE, GLC: 137 MG/DL
GLUCOSE, GLC: 137 MG/DL
GLUCOSE, GLC: 141 MG/DL
GLUCOSE, GLC: 142 MG/DL
GLUCOSE, GLC: 152 MG/DL
GLUCOSE, GLC: 158 MG/DL
GLUCOSE, GLC: 158 MG/DL
GLUCOSE, GLC: 160 MG/DL
GLUCOSE, GLC: 182 MG/DL
GLUCOSE, GLC: 189 MG/DL
GLUCOSE, GLC: 195 MG/DL
GLUCOSE, GLC: 201 MG/DL
GLUCOSE, GLC: 75 MG/DL
GLUCOSE, GLC: 80 MG/DL
GLUCOSE, GLC: 80 MG/DL
GLUCOSE, GLC: 84 MG/DL
GLUCOSE, GLC: 85 MG/DL
GLUCOSE, GLC: 86 MG/DL
GLUCOSE, GLC: 87 MG/DL
GLUCOSE, GLC: 87 MG/DL
GLUCOSE, GLC: 88 MG/DL
GLUCOSE, GLC: 88 MG/DL
GLUCOSE, GLC: 90 MG/DL
GLUCOSE, GLC: 90 MG/DL
GLUCOSE, GLC: 91 MG/DL
GLUCOSE, GLC: 91 MG/DL
GLUCOSE, GLC: 94 MG/DL
GLUCOSE, GLC: 95 MG/DL
GLUCOSE, GLC: 96 MG/DL
GLUCOSE, GLC: 97 MG/DL
GLUCOSE, GLC: 97 MG/DL
GLUCOSE, GLC: 99 MG/DL
GLUCOSE, GLC: NORMAL MG/DL
GRAM STN SPEC: ABNORMAL
H CAPSUL AB TITR SER ID: NEGATIVE {TITER}
H CAPSUL AG SER IA-ACNC: <0.5
HBA1C MFR BLD: 6.6 % (ref 4.2–6.3)
HCO3 BLD-SCNC: 24.8 MMOL/L (ref 22–26)
HCO3 BLD-SCNC: 25.4 MMOL/L (ref 22–26)
HCO3 BLD-SCNC: 25.9 MMOL/L (ref 22–26)
HCO3 BLD-SCNC: 26 MMOL/L (ref 22–26)
HCO3 BLD-SCNC: 26.3 MMOL/L (ref 22–26)
HCO3 BLD-SCNC: 26.4 MMOL/L (ref 22–26)
HCO3 BLD-SCNC: 26.9 MMOL/L (ref 22–26)
HCO3 BLD-SCNC: 26.9 MMOL/L (ref 22–26)
HCO3 BLD-SCNC: 27 MMOL/L (ref 22–26)
HCO3 BLD-SCNC: 27.1 MMOL/L (ref 22–26)
HCO3 BLD-SCNC: 27.9 MMOL/L (ref 22–26)
HCO3 BLD-SCNC: 28.1 MMOL/L (ref 22–26)
HCO3 BLD-SCNC: 29.2 MMOL/L (ref 22–26)
HCO3 BLD-SCNC: 29.7 MMOL/L (ref 22–26)
HCO3 BLD-SCNC: 31.7 MMOL/L (ref 22–26)
HCO3 BLD-SCNC: 33.9 MMOL/L (ref 22–26)
HCO3 BLD-SCNC: 34.2 MMOL/L (ref 22–26)
HCO3 BLD-SCNC: 38.2 MMOL/L (ref 22–26)
HCO3 BLDV-SCNC: 19.9 MMOL/L (ref 23–28)
HCO3 BLDV-SCNC: 24 MMOL/L (ref 23–28)
HCO3 BLDV-SCNC: 24.8 MMOL/L (ref 23–28)
HCO3 BLDV-SCNC: 25.8 MMOL/L (ref 23–28)
HCO3 BLDV-SCNC: 26.4 MMOL/L (ref 23–28)
HCO3 BLDV-SCNC: 27.2 MMOL/L (ref 23–28)
HCO3 BLDV-SCNC: 27.5 MMOL/L (ref 23–28)
HCO3 BLDV-SCNC: 27.7 MMOL/L (ref 23–28)
HCO3 BLDV-SCNC: 27.9 MMOL/L (ref 23–28)
HCO3 BLDV-SCNC: 28.2 MMOL/L (ref 23–28)
HCO3 BLDV-SCNC: 28.7 MMOL/L (ref 23–28)
HCO3 BLDV-SCNC: 28.8 MMOL/L (ref 23–28)
HCO3 BLDV-SCNC: 28.9 MMOL/L (ref 23–28)
HCO3 BLDV-SCNC: 29.4 MMOL/L (ref 23–28)
HCO3 BLDV-SCNC: 30 MMOL/L (ref 23–28)
HCO3 BLDV-SCNC: 30.5 MMOL/L (ref 23–28)
HCO3 BLDV-SCNC: 30.7 MMOL/L (ref 23–28)
HCO3 BLDV-SCNC: 31 MMOL/L (ref 23–28)
HCO3 BLDV-SCNC: 31.7 MMOL/L (ref 23–28)
HCO3 BLDV-SCNC: 33.3 MMOL/L (ref 23–28)
HCT VFR BLD AUTO: 18.8 % (ref 36.6–50.3)
HCT VFR BLD AUTO: 20.3 % (ref 36.6–50.3)
HCT VFR BLD AUTO: 20.5 % (ref 36.6–50.3)
HCT VFR BLD AUTO: 21.6 % (ref 36.6–50.3)
HCT VFR BLD AUTO: 21.9 % (ref 36.6–50.3)
HCT VFR BLD AUTO: 22.2 % (ref 36.6–50.3)
HCT VFR BLD AUTO: 22.3 % (ref 36.6–50.3)
HCT VFR BLD AUTO: 22.4 % (ref 36.6–50.3)
HCT VFR BLD AUTO: 22.4 % (ref 36.6–50.3)
HCT VFR BLD AUTO: 22.6 % (ref 36.6–50.3)
HCT VFR BLD AUTO: 22.7 % (ref 36.6–50.3)
HCT VFR BLD AUTO: 22.9 % (ref 36.6–50.3)
HCT VFR BLD AUTO: 23.1 % (ref 36.6–50.3)
HCT VFR BLD AUTO: 23.2 % (ref 36.6–50.3)
HCT VFR BLD AUTO: 23.9 % (ref 36.6–50.3)
HCT VFR BLD AUTO: 23.9 % (ref 36.6–50.3)
HCT VFR BLD AUTO: 24 % (ref 36.6–50.3)
HCT VFR BLD AUTO: 24 % (ref 36.6–50.3)
HCT VFR BLD AUTO: 24.1 % (ref 36.6–50.3)
HCT VFR BLD AUTO: 24.7 % (ref 36.6–50.3)
HCT VFR BLD AUTO: 24.8 % (ref 36.6–50.3)
HCT VFR BLD AUTO: 24.8 % (ref 36.6–50.3)
HCT VFR BLD AUTO: 24.9 % (ref 36.6–50.3)
HCT VFR BLD AUTO: 25 % (ref 36.6–50.3)
HCT VFR BLD AUTO: 25 % (ref 36.6–50.3)
HCT VFR BLD AUTO: 25.2 % (ref 36.6–50.3)
HCT VFR BLD AUTO: 25.2 % (ref 36.6–50.3)
HCT VFR BLD AUTO: 25.3 % (ref 36.6–50.3)
HCT VFR BLD AUTO: 25.4 % (ref 36.6–50.3)
HCT VFR BLD AUTO: 25.4 % (ref 36.6–50.3)
HCT VFR BLD AUTO: 25.5 % (ref 36.6–50.3)
HCT VFR BLD AUTO: 25.8 % (ref 36.6–50.3)
HCT VFR BLD AUTO: 25.8 % (ref 36.6–50.3)
HCT VFR BLD AUTO: 25.9 % (ref 36.6–50.3)
HCT VFR BLD AUTO: 25.9 % (ref 36.6–50.3)
HCT VFR BLD AUTO: 26 % (ref 36.6–50.3)
HCT VFR BLD AUTO: 26.1 % (ref 36.6–50.3)
HCT VFR BLD AUTO: 26.2 % (ref 36.6–50.3)
HCT VFR BLD AUTO: 26.2 % (ref 36.6–50.3)
HCT VFR BLD AUTO: 26.3 % (ref 36.6–50.3)
HCT VFR BLD AUTO: 26.4 % (ref 36.6–50.3)
HCT VFR BLD AUTO: 26.4 % (ref 36.6–50.3)
HCT VFR BLD AUTO: 26.7 % (ref 36.6–50.3)
HCT VFR BLD AUTO: 26.9 % (ref 36.6–50.3)
HCT VFR BLD AUTO: 27 % (ref 36.6–50.3)
HCT VFR BLD AUTO: 27.1 % (ref 36.6–50.3)
HCT VFR BLD AUTO: 27.2 % (ref 36.6–50.3)
HCT VFR BLD AUTO: 27.3 % (ref 36.6–50.3)
HCT VFR BLD AUTO: 27.5 % (ref 36.6–50.3)
HCT VFR BLD AUTO: 27.6 % (ref 36.6–50.3)
HCT VFR BLD AUTO: 27.7 % (ref 36.6–50.3)
HCT VFR BLD AUTO: 27.9 % (ref 36.6–50.3)
HCT VFR BLD AUTO: 28 % (ref 36.6–50.3)
HCT VFR BLD AUTO: 28 % (ref 36.6–50.3)
HCT VFR BLD AUTO: 28.1 % (ref 36.6–50.3)
HCT VFR BLD AUTO: 28.1 % (ref 36.6–50.3)
HCT VFR BLD AUTO: 28.2 % (ref 36.6–50.3)
HCT VFR BLD AUTO: 28.3 % (ref 36.6–50.3)
HCT VFR BLD AUTO: 28.3 % (ref 36.6–50.3)
HCT VFR BLD AUTO: 28.5 % (ref 36.6–50.3)
HCT VFR BLD AUTO: 28.5 % (ref 36.6–50.3)
HCT VFR BLD AUTO: 28.6 % (ref 36.6–50.3)
HCT VFR BLD AUTO: 28.7 % (ref 36.6–50.3)
HCT VFR BLD AUTO: 28.8 % (ref 36.6–50.3)
HCT VFR BLD AUTO: 28.8 % (ref 36.6–50.3)
HCT VFR BLD AUTO: 29.5 % (ref 36.6–50.3)
HCT VFR BLD AUTO: 29.5 % (ref 36.6–50.3)
HCT VFR BLD AUTO: 29.6 % (ref 36.6–50.3)
HCT VFR BLD AUTO: 29.7 % (ref 36.6–50.3)
HCT VFR BLD AUTO: 29.9 % (ref 36.6–50.3)
HCT VFR BLD AUTO: 30 % (ref 36.6–50.3)
HCT VFR BLD AUTO: 30.2 % (ref 36.6–50.3)
HCT VFR BLD AUTO: 30.6 % (ref 36.6–50.3)
HCT VFR BLD AUTO: 30.6 % (ref 36.6–50.3)
HCT VFR BLD AUTO: 30.8 % (ref 36.6–50.3)
HCT VFR BLD AUTO: 30.9 % (ref 36.6–50.3)
HCT VFR BLD AUTO: 31.3 % (ref 36.6–50.3)
HCT VFR BLD AUTO: 31.5 % (ref 36.6–50.3)
HCT VFR BLD AUTO: 31.7 % (ref 36.6–50.3)
HCT VFR BLD AUTO: 31.9 % (ref 36.6–50.3)
HCT VFR BLD AUTO: 33 % (ref 36.6–50.3)
HCV AB SERPL QL IA: NONREACTIVE
HCV COMMENT,HCGAC: NORMAL
HDLC SERPL-MCNC: 21 MG/DL
HDLC SERPL: 4.3 {RATIO} (ref 0–5)
HEMOCCULT STL QL: NEGATIVE
HEMOCCULT STL QL: POSITIVE
HEMOCCULT STL QL: POSITIVE
HGB BLD-MCNC: 10 G/DL (ref 12.1–17)
HGB BLD-MCNC: 10 G/DL (ref 12.1–17)
HGB BLD-MCNC: 10.1 G/DL (ref 12.1–17)
HGB BLD-MCNC: 10.6 G/DL (ref 12.1–17)
HGB BLD-MCNC: 5.8 G/DL (ref 12.1–17)
HGB BLD-MCNC: 6.2 G/DL (ref 12.1–17)
HGB BLD-MCNC: 6.3 G/DL (ref 12.1–17)
HGB BLD-MCNC: 6.8 G/DL (ref 12.1–17)
HGB BLD-MCNC: 6.8 G/DL (ref 12.1–17)
HGB BLD-MCNC: 6.9 G/DL (ref 12.1–17)
HGB BLD-MCNC: 7 G/DL (ref 12.1–17)
HGB BLD-MCNC: 7 G/DL (ref 12.1–17)
HGB BLD-MCNC: 7.1 G/DL (ref 12.1–17)
HGB BLD-MCNC: 7.1 G/DL (ref 12.1–17)
HGB BLD-MCNC: 7.2 G/DL (ref 12.1–17)
HGB BLD-MCNC: 7.2 G/DL (ref 12.1–17)
HGB BLD-MCNC: 7.3 G/DL (ref 12.1–17)
HGB BLD-MCNC: 7.5 G/DL (ref 12.1–17)
HGB BLD-MCNC: 7.6 G/DL (ref 12.1–17)
HGB BLD-MCNC: 7.6 G/DL (ref 12.1–17)
HGB BLD-MCNC: 7.7 G/DL (ref 12.1–17)
HGB BLD-MCNC: 7.7 G/DL (ref 12.1–17)
HGB BLD-MCNC: 7.8 G/DL (ref 12.1–17)
HGB BLD-MCNC: 7.9 G/DL (ref 12.1–17)
HGB BLD-MCNC: 8 G/DL (ref 12.1–17)
HGB BLD-MCNC: 8.1 G/DL (ref 12.1–17)
HGB BLD-MCNC: 8.1 G/DL (ref 12.1–17)
HGB BLD-MCNC: 8.2 G/DL (ref 12.1–17)
HGB BLD-MCNC: 8.3 G/DL (ref 12.1–17)
HGB BLD-MCNC: 8.4 G/DL (ref 12.1–17)
HGB BLD-MCNC: 8.5 G/DL (ref 12.1–17)
HGB BLD-MCNC: 8.6 G/DL (ref 12.1–17)
HGB BLD-MCNC: 8.7 G/DL (ref 12.1–17)
HGB BLD-MCNC: 8.8 G/DL (ref 12.1–17)
HGB BLD-MCNC: 8.9 G/DL (ref 12.1–17)
HGB BLD-MCNC: 9 G/DL (ref 12.1–17)
HGB BLD-MCNC: 9 G/DL (ref 12.1–17)
HGB BLD-MCNC: 9.1 G/DL (ref 12.1–17)
HGB BLD-MCNC: 9.1 G/DL (ref 12.1–17)
HGB BLD-MCNC: 9.3 G/DL (ref 12.1–17)
HGB BLD-MCNC: 9.3 G/DL (ref 12.1–17)
HGB BLD-MCNC: 9.4 G/DL (ref 12.1–17)
HGB BLD-MCNC: 9.5 G/DL (ref 12.1–17)
HGB BLD-MCNC: 9.5 G/DL (ref 12.1–17)
HGB BLD-MCNC: 9.7 G/DL (ref 12.1–17)
HGB BLD-MCNC: 9.8 G/DL (ref 12.1–17)
HGB BLD-MCNC: 9.8 G/DL (ref 12.1–17)
HGB FREE PLAS-MCNC: 5.3 MG/DL (ref 0–4.9)
HGB UR QL STRIP: ABNORMAL
HIGH TARGET, HITG: 130 MG/DL
HIGH TARGET, HITG: 140 MG/DL
HIGH TARGET, HITG: NORMAL MG/DL
HISTOPLASMA AG, UR,HAGU: POSITIVE
HIV 1+2 AB+HIV1 P24 AG SERPL QL IA: NONREACTIVE
HIV12 RESULT COMMENT, HHIVC: NORMAL
HYALINE CASTS URNS QL MICRO: ABNORMAL /LPF (ref 0–5)
IMM GRANULOCYTES # BLD AUTO: 0 K/UL
IMM GRANULOCYTES # BLD AUTO: 0 K/UL (ref 0–0.04)
IMM GRANULOCYTES # BLD AUTO: 0 K/UL (ref 0–0.04)
IMM GRANULOCYTES NFR BLD AUTO: 0 %
IMM GRANULOCYTES NFR BLD AUTO: 1 % (ref 0–0.5)
IMM GRANULOCYTES NFR BLD AUTO: 1 % (ref 0–0.5)
INR PPP: 1.1 (ref 0.9–1.1)
INR PPP: 1.2 (ref 0.9–1.1)
INR PPP: 1.3 (ref 0.9–1.1)
INR PPP: 1.4 (ref 0.9–1.1)
INR PPP: 1.5 (ref 0.9–1.1)
INR PPP: 1.5 (ref 0.9–1.1)
INR PPP: 1.6 (ref 0.9–1.1)
INR PPP: 1.7 (ref 0.9–1.1)
INR PPP: 1.8 (ref 0.9–1.1)
INR PPP: 1.9 (ref 0.9–1.1)
INR PPP: 2 (ref 0.9–1.1)
INR PPP: 2.1 (ref 0.9–1.1)
INR PPP: 2.2 (ref 0.9–1.1)
INR PPP: 2.3 (ref 0.9–1.1)
INR PPP: 2.4 (ref 0.9–1.1)
INR PPP: 2.4 (ref 0.9–1.1)
INR PPP: 2.5 (ref 0.9–1.1)
INR PPP: 2.6 (ref 0.9–1.1)
INR PPP: 2.7 (ref 0.9–1.1)
INR PPP: 2.8 (ref 0.9–1.1)
INR PPP: 2.9 (ref 0.9–1.1)
INR PPP: 3.2 (ref 0.9–1.1)
INR PPP: 3.5 (ref 0.9–1.1)
INR PPP: 4 (ref 0.9–1.1)
INR PPP: 5 (ref 0.9–1.1)
INSULIN ADMINSTERED, INSADM: 0 UNITS/HOUR
INSULIN ADMINSTERED, INSADM: 0.3 UNITS/HOUR
INSULIN ADMINSTERED, INSADM: 0.4 UNITS/HOUR
INSULIN ADMINSTERED, INSADM: 0.5 UNITS/HOUR
INSULIN ADMINSTERED, INSADM: 0.6 UNITS/HOUR
INSULIN ADMINSTERED, INSADM: 0.6 UNITS/HOUR
INSULIN ADMINSTERED, INSADM: 0.7 UNITS/HOUR
INSULIN ADMINSTERED, INSADM: 0.8 UNITS/HOUR
INSULIN ADMINSTERED, INSADM: 0.9 UNITS/HOUR
INSULIN ADMINSTERED, INSADM: 0.9 UNITS/HOUR
INSULIN ADMINSTERED, INSADM: 1.1 UNITS/HOUR
INSULIN ADMINSTERED, INSADM: 1.2 UNITS/HOUR
INSULIN ADMINSTERED, INSADM: 1.3 UNITS/HOUR
INSULIN ADMINSTERED, INSADM: 1.3 UNITS/HOUR
INSULIN ADMINSTERED, INSADM: 1.5 UNITS/HOUR
INSULIN ADMINSTERED, INSADM: 1.6 UNITS/HOUR
INSULIN ADMINSTERED, INSADM: 1.6 UNITS/HOUR
INSULIN ADMINSTERED, INSADM: 1.8 UNITS/HOUR
INSULIN ADMINSTERED, INSADM: 1.9 UNITS/HOUR
INSULIN ADMINSTERED, INSADM: 1.9 UNITS/HOUR
INSULIN ADMINSTERED, INSADM: 10.3 UNITS/HOUR
INSULIN ADMINSTERED, INSADM: 2 UNITS/HOUR
INSULIN ADMINSTERED, INSADM: 2.3 UNITS/HOUR
INSULIN ADMINSTERED, INSADM: 2.4 UNITS/HOUR
INSULIN ADMINSTERED, INSADM: 2.4 UNITS/HOUR
INSULIN ADMINSTERED, INSADM: 2.8 UNITS/HOUR
INSULIN ADMINSTERED, INSADM: 3.2 UNITS/HOUR
INSULIN ADMINSTERED, INSADM: 3.5 UNITS/HOUR
INSULIN ADMINSTERED, INSADM: 3.8 UNITS/HOUR
INSULIN ADMINSTERED, INSADM: 3.8 UNITS/HOUR
INSULIN ADMINSTERED, INSADM: 3.9 UNITS/HOUR
INSULIN ADMINSTERED, INSADM: 4.2 UNITS/HOUR
INSULIN ADMINSTERED, INSADM: 4.3 UNITS/HOUR
INSULIN ADMINSTERED, INSADM: 4.6 UNITS/HOUR
INSULIN ADMINSTERED, INSADM: 4.6 UNITS/HOUR
INSULIN ADMINSTERED, INSADM: 5.4 UNITS/HOUR
INSULIN ADMINSTERED, INSADM: 7.1 UNITS/HOUR
INSULIN ADMINSTERED, INSADM: 8.1 UNITS/HOUR
INSULIN ADMINSTERED, INSADM: 8.5 UNITS/HOUR
INSULIN ADMINSTERED, INSADM: NORMAL UNITS/HOUR
INSULIN BOLUS ADMINISTERED, INSBOLADM: 5.7 UNITS/HOUR
INSULIN BOLUS ADMINISTERED, INSBOLADM: 6.9 UNITS/HOUR
INSULIN BOLUS ORDERED, INSBOLORD: 5.7 UNITS/HOUR
INSULIN BOLUS ORDERED, INSBOLORD: 6.9 UNITS/HOUR
INSULIN ORDER, INSORD: 0 UNITS/HOUR
INSULIN ORDER, INSORD: 0.2 UNITS/HOUR
INSULIN ORDER, INSORD: 0.3 UNITS/HOUR
INSULIN ORDER, INSORD: 0.4 UNITS/HOUR
INSULIN ORDER, INSORD: 0.4 UNITS/HOUR
INSULIN ORDER, INSORD: 0.5 UNITS/HOUR
INSULIN ORDER, INSORD: 0.6 UNITS/HOUR
INSULIN ORDER, INSORD: 0.6 UNITS/HOUR
INSULIN ORDER, INSORD: 0.7 UNITS/HOUR
INSULIN ORDER, INSORD: 0.8 UNITS/HOUR
INSULIN ORDER, INSORD: 0.8 UNITS/HOUR
INSULIN ORDER, INSORD: 0.9 UNITS/HOUR
INSULIN ORDER, INSORD: 0.9 UNITS/HOUR
INSULIN ORDER, INSORD: 1.1 UNITS/HOUR
INSULIN ORDER, INSORD: 1.2 UNITS/HOUR
INSULIN ORDER, INSORD: 1.3 UNITS/HOUR
INSULIN ORDER, INSORD: 1.3 UNITS/HOUR
INSULIN ORDER, INSORD: 1.4 UNITS/HOUR
INSULIN ORDER, INSORD: 1.5 UNITS/HOUR
INSULIN ORDER, INSORD: 1.6 UNITS/HOUR
INSULIN ORDER, INSORD: 1.6 UNITS/HOUR
INSULIN ORDER, INSORD: 1.8 UNITS/HOUR
INSULIN ORDER, INSORD: 1.9 UNITS/HOUR
INSULIN ORDER, INSORD: 1.9 UNITS/HOUR
INSULIN ORDER, INSORD: 10.3 UNITS/HOUR
INSULIN ORDER, INSORD: 2 UNITS/HOUR
INSULIN ORDER, INSORD: 2.3 UNITS/HOUR
INSULIN ORDER, INSORD: 2.4 UNITS/HOUR
INSULIN ORDER, INSORD: 2.4 UNITS/HOUR
INSULIN ORDER, INSORD: 2.8 UNITS/HOUR
INSULIN ORDER, INSORD: 3.2 UNITS/HOUR
INSULIN ORDER, INSORD: 3.5 UNITS/HOUR
INSULIN ORDER, INSORD: 3.8 UNITS/HOUR
INSULIN ORDER, INSORD: 3.8 UNITS/HOUR
INSULIN ORDER, INSORD: 3.9 UNITS/HOUR
INSULIN ORDER, INSORD: 4.2 UNITS/HOUR
INSULIN ORDER, INSORD: 4.3 UNITS/HOUR
INSULIN ORDER, INSORD: 4.6 UNITS/HOUR
INSULIN ORDER, INSORD: 4.6 UNITS/HOUR
INSULIN ORDER, INSORD: 5.4 UNITS/HOUR
INSULIN ORDER, INSORD: 7.1 UNITS/HOUR
INSULIN ORDER, INSORD: 8.1 UNITS/HOUR
INSULIN ORDER, INSORD: 8.5 UNITS/HOUR
INSULIN ORDER, INSORD: NORMAL UNITS/HOUR
INTERPRETATION, 117893: ABNORMAL
INTERPRETATION, 910378, CSIR1: ABNORMAL
IRON SATN MFR SERPL: 18 % (ref 20–50)
IRON SATN MFR SERPL: 21 % (ref 20–50)
IRON SATN MFR SERPL: 26 % (ref 20–50)
IRON SATN MFR SERPL: 42 % (ref 20–50)
IRON SATN MFR SERPL: 9 % (ref 20–50)
IRON SERPL-MCNC: 25 UG/DL (ref 35–150)
IRON SERPL-MCNC: 28 UG/DL (ref 35–150)
IRON SERPL-MCNC: 35 UG/DL (ref 35–150)
IRON SERPL-MCNC: 41 UG/DL (ref 35–150)
IRON SERPL-MCNC: 74 UG/DL (ref 35–150)
KETONES UR QL STRIP.AUTO: 15 MG/DL
KETONES UR QL STRIP.AUTO: NEGATIVE MG/DL
LACTATE SERPL-SCNC: 0.6 MMOL/L (ref 0.4–2)
LACTATE SERPL-SCNC: 0.7 MMOL/L (ref 0.4–2)
LACTATE SERPL-SCNC: 0.8 MMOL/L (ref 0.4–2)
LACTATE SERPL-SCNC: 0.9 MMOL/L (ref 0.4–2)
LACTATE SERPL-SCNC: 1 MMOL/L (ref 0.4–2)
LACTATE SERPL-SCNC: 1.1 MMOL/L (ref 0.4–2)
LACTATE SERPL-SCNC: 1.2 MMOL/L (ref 0.4–2)
LACTATE SERPL-SCNC: 1.3 MMOL/L (ref 0.4–2)
LACTATE SERPL-SCNC: 1.4 MMOL/L (ref 0.4–2)
LACTATE SERPL-SCNC: 1.5 MMOL/L (ref 0.4–2)
LACTATE SERPL-SCNC: 1.5 MMOL/L (ref 0.4–2)
LACTATE SERPL-SCNC: 1.6 MMOL/L (ref 0.4–2)
LACTATE SERPL-SCNC: 1.7 MMOL/L (ref 0.4–2)
LACTATE SERPL-SCNC: 1.7 MMOL/L (ref 0.4–2)
LACTATE SERPL-SCNC: 1.8 MMOL/L (ref 0.4–2)
LACTATE SERPL-SCNC: 3.8 MMOL/L (ref 0.4–2)
LDH SERPL L TO P-CCNC: 189 U/L (ref 85–241)
LDH SERPL L TO P-CCNC: 190 U/L (ref 85–241)
LDH SERPL L TO P-CCNC: 192 U/L (ref 85–241)
LDH SERPL L TO P-CCNC: 193 U/L (ref 85–241)
LDH SERPL L TO P-CCNC: 202 U/L (ref 85–241)
LDH SERPL L TO P-CCNC: 204 U/L (ref 85–241)
LDH SERPL L TO P-CCNC: 208 U/L (ref 85–241)
LDH SERPL L TO P-CCNC: 209 U/L (ref 85–241)
LDH SERPL L TO P-CCNC: 210 U/L (ref 85–241)
LDH SERPL L TO P-CCNC: 214 U/L (ref 85–241)
LDH SERPL L TO P-CCNC: 218 U/L (ref 85–241)
LDH SERPL L TO P-CCNC: 218 U/L (ref 85–241)
LDH SERPL L TO P-CCNC: 225 U/L (ref 85–241)
LDH SERPL L TO P-CCNC: 228 U/L (ref 85–241)
LDH SERPL L TO P-CCNC: 230 U/L (ref 85–241)
LDH SERPL L TO P-CCNC: 233 U/L (ref 85–241)
LDH SERPL L TO P-CCNC: 234 U/L (ref 85–241)
LDH SERPL L TO P-CCNC: 235 U/L (ref 85–241)
LDH SERPL L TO P-CCNC: 237 U/L (ref 85–241)
LDH SERPL L TO P-CCNC: 240 U/L (ref 85–241)
LDH SERPL L TO P-CCNC: 242 U/L (ref 85–241)
LDH SERPL L TO P-CCNC: 246 U/L (ref 85–241)
LDH SERPL L TO P-CCNC: 247 U/L (ref 85–241)
LDH SERPL L TO P-CCNC: 248 U/L (ref 85–241)
LDH SERPL L TO P-CCNC: 249 U/L (ref 85–241)
LDH SERPL L TO P-CCNC: 250 U/L (ref 85–241)
LDH SERPL L TO P-CCNC: 256 U/L (ref 85–241)
LDH SERPL L TO P-CCNC: 257 U/L (ref 85–241)
LDH SERPL L TO P-CCNC: 257 U/L (ref 85–241)
LDH SERPL L TO P-CCNC: 258 U/L (ref 85–241)
LDH SERPL L TO P-CCNC: 267 U/L (ref 85–241)
LDH SERPL L TO P-CCNC: 271 U/L (ref 85–241)
LDH SERPL L TO P-CCNC: 271 U/L (ref 85–241)
LDH SERPL L TO P-CCNC: 284 U/L (ref 85–241)
LDH SERPL L TO P-CCNC: 286 U/L (ref 85–241)
LDH SERPL L TO P-CCNC: 310 U/L (ref 85–241)
LDH SERPL L TO P-CCNC: 312 U/L (ref 85–241)
LDH SERPL L TO P-CCNC: 341 U/L (ref 85–241)
LDH SERPL L TO P-CCNC: 348 U/L (ref 85–241)
LDH SERPL L TO P-CCNC: 350 U/L (ref 85–241)
LDH SERPL L TO P-CCNC: 352 U/L (ref 85–241)
LDH SERPL L TO P-CCNC: 389 U/L (ref 85–241)
LDH SERPL L TO P-CCNC: 410 U/L (ref 85–241)
LDH SERPL L TO P-CCNC: 421 U/L (ref 85–241)
LDH SERPL L TO P-CCNC: 422 U/L (ref 85–241)
LDH SERPL L TO P-CCNC: 427 U/L (ref 85–241)
LDH SERPL L TO P-CCNC: 435 U/L (ref 85–241)
LDH SERPL L TO P-CCNC: 435 U/L (ref 85–241)
LDH SERPL L TO P-CCNC: 436 U/L (ref 85–241)
LDH SERPL L TO P-CCNC: 437 U/L (ref 85–241)
LDH SERPL L TO P-CCNC: 438 U/L (ref 85–241)
LDH SERPL L TO P-CCNC: 439 U/L (ref 85–241)
LDH SERPL L TO P-CCNC: 439 U/L (ref 85–241)
LDH SERPL L TO P-CCNC: 446 U/L (ref 85–241)
LDH SERPL L TO P-CCNC: 458 U/L (ref 85–241)
LDH SERPL L TO P-CCNC: 458 U/L (ref 85–241)
LDH SERPL L TO P-CCNC: 460 U/L (ref 85–241)
LDH SERPL L TO P-CCNC: 482 U/L (ref 85–241)
LDH SERPL L TO P-CCNC: 491 U/L (ref 85–241)
LDH SERPL L TO P-CCNC: 496 U/L (ref 85–241)
LDH SERPL L TO P-CCNC: 516 U/L (ref 85–241)
LDH SERPL L TO P-CCNC: 630 U/L (ref 85–241)
LDH SERPL L TO P-CCNC: 910 U/L (ref 85–241)
LDLC SERPL CALC-MCNC: 56.2 MG/DL (ref 0–100)
LEFT ABI: 1.2
LEFT ANTERIOR TIBIAL: 120 MMHG
LEFT ARM BP: 100 MMHG
LEFT CCA DIST DIAS: 16.8 CM/S
LEFT CCA DIST SYS: 53.5 CM/S
LEFT CCA PROX DIAS: 20.3 CM/S
LEFT CCA PROX SYS: 60.5 CM/S
LEFT DIST BRACHIAL A EDV: 0 CM/S
LEFT DIST BRACHIAL A EDV: 6.5 CM/S
LEFT DIST BRACHIAL A PSV: 18.9 CM/S
LEFT DIST BRACHIAL A PSV: 7.4 CM/S
LEFT DIST RADIAL A PSV: 11.3 CM/S
LEFT DIST RADIAL A PSV: 17.5 CM/S
LEFT DIST ULNAR A PSV: 12.8 CM/S
LEFT DIST ULNAR A PSV: 6.1 CM/S
LEFT ECA DIAS: 9.86 CM/S
LEFT ECA SYS: 54.4 CM/S
LEFT ICA DIST DIAS: 12.2 CM/S
LEFT ICA DIST SYS: 32 CM/S
LEFT ICA MID DIAS: 23 CM/S
LEFT ICA MID SYS: 57.2 CM/S
LEFT ICA PROX DIAS: 26.5 CM/S
LEFT ICA PROX SYS: 58.5 CM/S
LEFT ICA/CCA SYS: 1.09
LEFT MID AX A PSV: 18 CM/S
LEFT POSTERIOR TIBIAL: 120 MMHG
LEFT PROX BRACHIAL A PSV: 0 CM/S
LEFT SUBCLAVIAN DIAS: 20.19 CM/S
LEFT SUBCLAVIAN DIAS: 24.07 CM/S
LEFT SUBCLAVIAN SYS: 39.5 CM/S
LEFT SUBCLAVIAN SYS: 71.9 CM/S
LEFT VERTEBRAL DIAS: 5.75 CM/S
LEFT VERTEBRAL SYS: 28.6 CM/S
LEUKOCYTE ESTERASE UR QL STRIP.AUTO: ABNORMAL
LEUKOCYTE ESTERASE UR QL STRIP.AUTO: NEGATIVE
LEUKOCYTE ESTERASE UR QL STRIP.AUTO: NEGATIVE
LEVETIRACETAM SERPL-MCNC: 15.4 UG/ML (ref 10–40)
LIPID PROFILE,FLP: NORMAL
LOW TARGET, LOT: 100 MG/DL
LOW TARGET, LOT: 95 MG/DL
LOW TARGET, LOT: NORMAL MG/DL
LVFS 2D: 1.85 %
LVFS 2D: 15.21 %
LVFS 2D: 16.12 %
LVFS 2D: 20.99 %
LVFS 2D: 5.19 %
LVFS 2D: 7.04 %
LVFS 2D: 7.46 %
LVFS 2D: 8.15 %
LYMPHOCYTES # BLD: 0.3 K/UL (ref 0.8–3.5)
LYMPHOCYTES # BLD: 0.4 K/UL (ref 0.8–3.5)
LYMPHOCYTES # BLD: 0.7 K/UL (ref 0.8–3.5)
LYMPHOCYTES NFR BLD: 18 % (ref 12–49)
LYMPHOCYTES NFR BLD: 7 % (ref 12–49)
LYMPHOCYTES NFR BLD: 9 % (ref 12–49)
Lab: 8.2 NG/ML
MAGNESIUM SERPL-MCNC: 1.7 MG/DL (ref 1.6–2.4)
MAGNESIUM SERPL-MCNC: 1.8 MG/DL (ref 1.6–2.4)
MAGNESIUM SERPL-MCNC: 1.9 MG/DL (ref 1.6–2.4)
MAGNESIUM SERPL-MCNC: 2 MG/DL (ref 1.6–2.4)
MAGNESIUM SERPL-MCNC: 2.1 MG/DL (ref 1.6–2.4)
MAGNESIUM SERPL-MCNC: 2.2 MG/DL (ref 1.6–2.4)
MAGNESIUM SERPL-MCNC: 2.3 MG/DL (ref 1.6–2.4)
MAGNESIUM SERPL-MCNC: 2.4 MG/DL (ref 1.6–2.4)
MAGNESIUM SERPL-MCNC: 2.5 MG/DL (ref 1.6–2.4)
MAGNESIUM SERPL-MCNC: 2.5 MG/DL (ref 1.6–2.4)
MAGNESIUM SERPL-MCNC: 2.6 MG/DL (ref 1.6–2.4)
MCH RBC QN AUTO: 28.6 PG (ref 26–34)
MCH RBC QN AUTO: 28.6 PG (ref 26–34)
MCH RBC QN AUTO: 28.7 PG (ref 26–34)
MCH RBC QN AUTO: 28.8 PG (ref 26–34)
MCH RBC QN AUTO: 28.9 PG (ref 26–34)
MCH RBC QN AUTO: 29 PG (ref 26–34)
MCH RBC QN AUTO: 29.1 PG (ref 26–34)
MCH RBC QN AUTO: 29.2 PG (ref 26–34)
MCH RBC QN AUTO: 29.3 PG (ref 26–34)
MCH RBC QN AUTO: 29.4 PG (ref 26–34)
MCH RBC QN AUTO: 29.5 PG (ref 26–34)
MCH RBC QN AUTO: 29.6 PG (ref 26–34)
MCH RBC QN AUTO: 29.7 PG (ref 26–34)
MCH RBC QN AUTO: 29.8 PG (ref 26–34)
MCH RBC QN AUTO: 29.9 PG (ref 26–34)
MCH RBC QN AUTO: 30 PG (ref 26–34)
MCH RBC QN AUTO: 30.1 PG (ref 26–34)
MCH RBC QN AUTO: 30.2 PG (ref 26–34)
MCH RBC QN AUTO: 30.4 PG (ref 26–34)
MCH RBC QN AUTO: 30.4 PG (ref 26–34)
MCH RBC QN AUTO: 30.5 PG (ref 26–34)
MCH RBC QN AUTO: 30.6 PG (ref 26–34)
MCH RBC QN AUTO: 30.6 PG (ref 26–34)
MCH RBC QN AUTO: 30.7 PG (ref 26–34)
MCH RBC QN AUTO: 30.7 PG (ref 26–34)
MCHC RBC AUTO-ENTMCNC: 29.2 G/DL (ref 30–36.5)
MCHC RBC AUTO-ENTMCNC: 29.4 G/DL (ref 30–36.5)
MCHC RBC AUTO-ENTMCNC: 29.7 G/DL (ref 30–36.5)
MCHC RBC AUTO-ENTMCNC: 29.9 G/DL (ref 30–36.5)
MCHC RBC AUTO-ENTMCNC: 30 G/DL (ref 30–36.5)
MCHC RBC AUTO-ENTMCNC: 30 G/DL (ref 30–36.5)
MCHC RBC AUTO-ENTMCNC: 30.1 G/DL (ref 30–36.5)
MCHC RBC AUTO-ENTMCNC: 30.1 G/DL (ref 30–36.5)
MCHC RBC AUTO-ENTMCNC: 30.2 G/DL (ref 30–36.5)
MCHC RBC AUTO-ENTMCNC: 30.3 G/DL (ref 30–36.5)
MCHC RBC AUTO-ENTMCNC: 30.4 G/DL (ref 30–36.5)
MCHC RBC AUTO-ENTMCNC: 30.5 G/DL (ref 30–36.5)
MCHC RBC AUTO-ENTMCNC: 30.6 G/DL (ref 30–36.5)
MCHC RBC AUTO-ENTMCNC: 30.7 G/DL (ref 30–36.5)
MCHC RBC AUTO-ENTMCNC: 30.8 G/DL (ref 30–36.5)
MCHC RBC AUTO-ENTMCNC: 30.9 G/DL (ref 30–36.5)
MCHC RBC AUTO-ENTMCNC: 31 G/DL (ref 30–36.5)
MCHC RBC AUTO-ENTMCNC: 31.1 G/DL (ref 30–36.5)
MCHC RBC AUTO-ENTMCNC: 31.3 G/DL (ref 30–36.5)
MCHC RBC AUTO-ENTMCNC: 31.4 G/DL (ref 30–36.5)
MCHC RBC AUTO-ENTMCNC: 31.5 G/DL (ref 30–36.5)
MCHC RBC AUTO-ENTMCNC: 31.6 G/DL (ref 30–36.5)
MCHC RBC AUTO-ENTMCNC: 31.6 G/DL (ref 30–36.5)
MCHC RBC AUTO-ENTMCNC: 31.7 G/DL (ref 30–36.5)
MCHC RBC AUTO-ENTMCNC: 32.1 G/DL (ref 30–36.5)
MCHC RBC AUTO-ENTMCNC: 32.1 G/DL (ref 30–36.5)
MCHC RBC AUTO-ENTMCNC: 32.5 G/DL (ref 30–36.5)
MCV RBC AUTO: 100 FL (ref 80–99)
MCV RBC AUTO: 100.4 FL (ref 80–99)
MCV RBC AUTO: 100.7 FL (ref 80–99)
MCV RBC AUTO: 101.5 FL (ref 80–99)
MCV RBC AUTO: 90.1 FL (ref 80–99)
MCV RBC AUTO: 90.3 FL (ref 80–99)
MCV RBC AUTO: 90.8 FL (ref 80–99)
MCV RBC AUTO: 90.8 FL (ref 80–99)
MCV RBC AUTO: 91.1 FL (ref 80–99)
MCV RBC AUTO: 91.2 FL (ref 80–99)
MCV RBC AUTO: 91.4 FL (ref 80–99)
MCV RBC AUTO: 91.5 FL (ref 80–99)
MCV RBC AUTO: 91.7 FL (ref 80–99)
MCV RBC AUTO: 92.3 FL (ref 80–99)
MCV RBC AUTO: 92.4 FL (ref 80–99)
MCV RBC AUTO: 92.6 FL (ref 80–99)
MCV RBC AUTO: 92.7 FL (ref 80–99)
MCV RBC AUTO: 92.8 FL (ref 80–99)
MCV RBC AUTO: 93.3 FL (ref 80–99)
MCV RBC AUTO: 93.3 FL (ref 80–99)
MCV RBC AUTO: 93.6 FL (ref 80–99)
MCV RBC AUTO: 94 FL (ref 80–99)
MCV RBC AUTO: 94.2 FL (ref 80–99)
MCV RBC AUTO: 94.4 FL (ref 80–99)
MCV RBC AUTO: 94.4 FL (ref 80–99)
MCV RBC AUTO: 94.5 FL (ref 80–99)
MCV RBC AUTO: 94.5 FL (ref 80–99)
MCV RBC AUTO: 94.9 FL (ref 80–99)
MCV RBC AUTO: 94.9 FL (ref 80–99)
MCV RBC AUTO: 95.1 FL (ref 80–99)
MCV RBC AUTO: 95.1 FL (ref 80–99)
MCV RBC AUTO: 95.2 FL (ref 80–99)
MCV RBC AUTO: 95.3 FL (ref 80–99)
MCV RBC AUTO: 95.4 FL (ref 80–99)
MCV RBC AUTO: 95.7 FL (ref 80–99)
MCV RBC AUTO: 95.8 FL (ref 80–99)
MCV RBC AUTO: 95.9 FL (ref 80–99)
MCV RBC AUTO: 95.9 FL (ref 80–99)
MCV RBC AUTO: 96 FL (ref 80–99)
MCV RBC AUTO: 96.2 FL (ref 80–99)
MCV RBC AUTO: 96.7 FL (ref 80–99)
MCV RBC AUTO: 96.9 FL (ref 80–99)
MCV RBC AUTO: 97.4 FL (ref 80–99)
MCV RBC AUTO: 97.4 FL (ref 80–99)
MCV RBC AUTO: 97.7 FL (ref 80–99)
MCV RBC AUTO: 97.8 FL (ref 80–99)
MCV RBC AUTO: 98.1 FL (ref 80–99)
MCV RBC AUTO: 98.3 FL (ref 80–99)
MCV RBC AUTO: 98.5 FL (ref 80–99)
MCV RBC AUTO: 98.6 FL (ref 80–99)
MCV RBC AUTO: 98.6 FL (ref 80–99)
MCV RBC AUTO: 98.7 FL (ref 80–99)
MCV RBC AUTO: 98.7 FL (ref 80–99)
MCV RBC AUTO: 98.8 FL (ref 80–99)
MCV RBC AUTO: 98.9 FL (ref 80–99)
MCV RBC AUTO: 99 FL (ref 80–99)
MCV RBC AUTO: 99.3 FL (ref 80–99)
MCV RBC AUTO: 99.6 FL (ref 80–99)
METHADONE UR QL: NEGATIVE
MINUTES UNTIL NEXT BG, NBG: 120 MIN
MINUTES UNTIL NEXT BG, NBG: 15 MIN
MINUTES UNTIL NEXT BG, NBG: 60 MIN
MINUTES UNTIL NEXT BG, NBG: NORMAL MIN
MONOCYTES # BLD: 0.3 K/UL (ref 0–1)
MONOCYTES # BLD: 0.4 K/UL (ref 0–1)
MONOCYTES # BLD: 0.6 K/UL (ref 0–1)
MONOCYTES NFR BLD: 12 % (ref 5–13)
MONOCYTES NFR BLD: 7 % (ref 5–13)
MONOCYTES NFR BLD: 9 % (ref 5–13)
MULTIPLIER, MUL: 0
MULTIPLIER, MUL: 0.01
MULTIPLIER, MUL: 0.02
MULTIPLIER, MUL: 0.03
MULTIPLIER, MUL: 0.04
MULTIPLIER, MUL: 0.05
MULTIPLIER, MUL: 0.06
MULTIPLIER, MUL: 0.07
MULTIPLIER, MUL: 0.08
MULTIPLIER, MUL: 0.08
MULTIPLIER, MUL: NORMAL
MV DEC SLOPE: 4.42
MV DEC SLOPE: 4.45
NEUTS SEG # BLD: 2.7 K/UL (ref 1.8–8)
NEUTS SEG # BLD: 3.4 K/UL (ref 1.8–8)
NEUTS SEG # BLD: 3.6 K/UL (ref 1.8–8)
NEUTS SEG NFR BLD: 70 % (ref 32–75)
NEUTS SEG NFR BLD: 77 % (ref 32–75)
NEUTS SEG NFR BLD: 86 % (ref 32–75)
NITRIC:PPM ISTAT,INITR: 20 PPM
NITRITE UR QL STRIP.AUTO: NEGATIVE
NITRITE UR QL STRIP.AUTO: POSITIVE
NRBC # BLD: 0 K/UL (ref 0–0.01)
NRBC # BLD: 0.02 K/UL (ref 0–0.01)
NRBC # BLD: 0.02 K/UL (ref 0–0.01)
NRBC BLD-RTO: 0 PER 100 WBC
NRBC BLD-RTO: 0.3 PER 100 WBC
NRBC BLD-RTO: 0.5 PER 100 WBC
O2/TOTAL GAS SETTING VFR VENT: 0.7 %
O2/TOTAL GAS SETTING VFR VENT: 1 %
O2/TOTAL GAS SETTING VFR VENT: 1 %
O2/TOTAL GAS SETTING VFR VENT: 21 %
O2/TOTAL GAS SETTING VFR VENT: 21 %
O2/TOTAL GAS SETTING VFR VENT: 28 %
O2/TOTAL GAS SETTING VFR VENT: 32 %
O2/TOTAL GAS SETTING VFR VENT: 36 %
O2/TOTAL GAS SETTING VFR VENT: 40 %
O2/TOTAL GAS SETTING VFR VENT: 50 %
O2/TOTAL GAS SETTING VFR VENT: 50 %
O2/TOTAL GAS SETTING VFR VENT: 60 %
O2/TOTAL GAS SETTING VFR VENT: 60 %
O2/TOTAL GAS SETTING VFR VENT: 80 %
OPIATES UR QL: NEGATIVE
ORDER INITIALS, ORDINIT: NORMAL
OSMOLALITY UR: NORMAL MOSM/KG H2O
P-R INTERVAL, ECG05: 108 MS
P-R INTERVAL, ECG05: 80 MS
P-R INTERVAL, ECG05: 86 MS
PCO2 BLD: 31.9 MMHG (ref 35–45)
PCO2 BLD: 32.5 MMHG (ref 35–45)
PCO2 BLD: 32.7 MMHG (ref 35–45)
PCO2 BLD: 35 MMHG (ref 35–45)
PCO2 BLD: 35.6 MMHG (ref 35–45)
PCO2 BLD: 36 MMHG (ref 35–45)
PCO2 BLD: 36.2 MMHG (ref 35–45)
PCO2 BLD: 37 MMHG (ref 35–45)
PCO2 BLD: 38.4 MMHG (ref 35–45)
PCO2 BLD: 39 MMHG (ref 35–45)
PCO2 BLD: 39.1 MMHG (ref 35–45)
PCO2 BLD: 39.1 MMHG (ref 35–45)
PCO2 BLD: 39.7 MMHG (ref 35–45)
PCO2 BLD: 41.6 MMHG (ref 35–45)
PCO2 BLD: 42.3 MMHG (ref 35–45)
PCO2 BLD: 46.8 MMHG (ref 35–45)
PCO2 BLD: 49.7 MMHG (ref 35–45)
PCO2 BLD: 61.2 MMHG (ref 35–45)
PCO2 BLD: >90 MMHG (ref 35–45)
PCO2 BLDV: 31.4 MMHG (ref 41–51)
PCO2 BLDV: 35 MMHG (ref 41–51)
PCO2 BLDV: 36.2 MMHG (ref 41–51)
PCO2 BLDV: 36.7 MMHG (ref 41–51)
PCO2 BLDV: 38.8 MMHG (ref 41–51)
PCO2 BLDV: 40.3 MMHG (ref 41–51)
PCO2 BLDV: 40.4 MMHG (ref 41–51)
PCO2 BLDV: 40.7 MMHG (ref 41–51)
PCO2 BLDV: 42.1 MMHG (ref 41–51)
PCO2 BLDV: 42.5 MMHG (ref 41–51)
PCO2 BLDV: 43.9 MMHG (ref 41–51)
PCO2 BLDV: 44.1 MMHG (ref 41–51)
PCO2 BLDV: 44.6 MMHG (ref 41–51)
PCO2 BLDV: 46.3 MMHG (ref 41–51)
PCO2 BLDV: 47.9 MMHG (ref 41–51)
PCO2 BLDV: 48.4 MMHG (ref 41–51)
PCO2 BLDV: 49.8 MMHG (ref 41–51)
PCO2 BLDV: 51.1 MMHG (ref 41–51)
PCO2 BLDV: 51.4 MMHG (ref 41–51)
PCO2 BLDV: 57 MMHG (ref 41–51)
PCP UR QL: NEGATIVE
PEEP RESPIRATORY: 5 CMH2O
PEEP RESPIRATORY: 8 CMH2O
PEEP RESPIRATORY: 9 CMH2O
PF4 HEPARIN CMPLX AB SER-ACNC: 0.36 OD (ref 0–0.4)
PH BLD: 7.15 [PH] (ref 7.35–7.45)
PH BLD: 7.36 [PH] (ref 7.35–7.45)
PH BLD: 7.41 [PH] (ref 7.35–7.45)
PH BLD: 7.43 [PH] (ref 7.35–7.45)
PH BLD: 7.45 [PH] (ref 7.35–7.45)
PH BLD: 7.46 [PH] (ref 7.35–7.45)
PH BLD: 7.46 [PH] (ref 7.35–7.45)
PH BLD: 7.47 [PH] (ref 7.35–7.45)
PH BLD: 7.48 [PH] (ref 7.35–7.45)
PH BLD: 7.49 [PH] (ref 7.35–7.45)
PH BLD: 7.51 [PH] (ref 7.35–7.45)
PH BLD: 7.52 [PH] (ref 7.35–7.45)
PH BLDV: 7.32 [PH] (ref 7.32–7.42)
PH BLDV: 7.33 [PH] (ref 7.32–7.42)
PH BLDV: 7.37 [PH] (ref 7.32–7.42)
PH BLDV: 7.37 [PH] (ref 7.32–7.42)
PH BLDV: 7.38 [PH] (ref 7.32–7.42)
PH BLDV: 7.39 [PH] (ref 7.32–7.42)
PH BLDV: 7.41 [PH] (ref 7.32–7.42)
PH BLDV: 7.41 [PH] (ref 7.32–7.42)
PH BLDV: 7.42 [PH] (ref 7.32–7.42)
PH BLDV: 7.42 [PH] (ref 7.32–7.42)
PH BLDV: 7.43 [PH] (ref 7.32–7.42)
PH BLDV: 7.44 [PH] (ref 7.32–7.42)
PH BLDV: 7.44 [PH] (ref 7.32–7.42)
PH BLDV: 7.46 [PH] (ref 7.32–7.42)
PH BLDV: 7.47 [PH] (ref 7.32–7.42)
PH BLDV: 7.47 [PH] (ref 7.32–7.42)
PH BLDV: 7.49 [PH] (ref 7.32–7.42)
PH UR STRIP: 5.5 [PH] (ref 5–8)
PH UR STRIP: 6 [PH] (ref 5–8)
PH UR STRIP: 6 [PH] (ref 5–8)
PH UR STRIP: 6.5 [PH] (ref 5–8)
PH UR STRIP: 8 [PH] (ref 5–8)
PHOSPHATE SERPL-MCNC: 2.8 MG/DL (ref 2.6–4.7)
PHOSPHATE SERPL-MCNC: 3.3 MG/DL (ref 2.6–4.7)
PHOSPHATE SERPL-MCNC: 3.5 MG/DL (ref 2.6–4.7)
PIP ISTAT,IPIP: 29
PIP ISTAT,IPIP: 30
PLATELET # BLD AUTO: 105 K/UL (ref 150–400)
PLATELET # BLD AUTO: 106 K/UL (ref 150–400)
PLATELET # BLD AUTO: 109 K/UL (ref 150–400)
PLATELET # BLD AUTO: 111 K/UL (ref 150–400)
PLATELET # BLD AUTO: 130 K/UL (ref 150–400)
PLATELET # BLD AUTO: 131 K/UL (ref 150–400)
PLATELET # BLD AUTO: 134 K/UL (ref 150–400)
PLATELET # BLD AUTO: 134 K/UL (ref 150–400)
PLATELET # BLD AUTO: 138 K/UL (ref 150–400)
PLATELET # BLD AUTO: 140 K/UL (ref 150–400)
PLATELET # BLD AUTO: 140 K/UL (ref 150–400)
PLATELET # BLD AUTO: 146 K/UL (ref 150–400)
PLATELET # BLD AUTO: 149 K/UL (ref 150–400)
PLATELET # BLD AUTO: 151 K/UL (ref 150–400)
PLATELET # BLD AUTO: 152 K/UL (ref 150–400)
PLATELET # BLD AUTO: 156 K/UL (ref 150–400)
PLATELET # BLD AUTO: 157 K/UL (ref 150–400)
PLATELET # BLD AUTO: 158 K/UL (ref 150–400)
PLATELET # BLD AUTO: 159 K/UL (ref 150–400)
PLATELET # BLD AUTO: 162 K/UL (ref 150–400)
PLATELET # BLD AUTO: 163 K/UL (ref 150–400)
PLATELET # BLD AUTO: 164 K/UL (ref 150–400)
PLATELET # BLD AUTO: 165 K/UL (ref 150–400)
PLATELET # BLD AUTO: 166 K/UL (ref 150–400)
PLATELET # BLD AUTO: 167 K/UL (ref 150–400)
PLATELET # BLD AUTO: 167 K/UL (ref 150–400)
PLATELET # BLD AUTO: 170 K/UL (ref 150–400)
PLATELET # BLD AUTO: 171 K/UL (ref 150–400)
PLATELET # BLD AUTO: 172 K/UL (ref 150–400)
PLATELET # BLD AUTO: 174 K/UL (ref 150–400)
PLATELET # BLD AUTO: 175 K/UL (ref 150–400)
PLATELET # BLD AUTO: 175 K/UL (ref 150–400)
PLATELET # BLD AUTO: 178 K/UL (ref 150–400)
PLATELET # BLD AUTO: 179 K/UL (ref 150–400)
PLATELET # BLD AUTO: 179 K/UL (ref 150–400)
PLATELET # BLD AUTO: 186 K/UL (ref 150–400)
PLATELET # BLD AUTO: 186 K/UL (ref 150–400)
PLATELET # BLD AUTO: 194 K/UL (ref 150–400)
PLATELET # BLD AUTO: 201 K/UL (ref 150–400)
PLATELET # BLD AUTO: 203 K/UL (ref 150–400)
PLATELET # BLD AUTO: 216 K/UL (ref 150–400)
PLATELET # BLD AUTO: 219 K/UL (ref 150–400)
PLATELET # BLD AUTO: 222 K/UL (ref 150–400)
PLATELET # BLD AUTO: 235 K/UL (ref 150–400)
PLATELET # BLD AUTO: 239 K/UL (ref 150–400)
PLATELET # BLD AUTO: 241 K/UL (ref 150–400)
PLATELET # BLD AUTO: 244 K/UL (ref 150–400)
PLATELET # BLD AUTO: 265 K/UL (ref 150–400)
PLATELET # BLD AUTO: 74 K/UL (ref 150–400)
PLATELET # BLD AUTO: 79 K/UL (ref 150–400)
PLATELET # BLD AUTO: 80 K/UL (ref 150–400)
PLATELET # BLD AUTO: 81 K/UL (ref 150–400)
PLATELET # BLD AUTO: 82 K/UL (ref 150–400)
PLATELET # BLD AUTO: 84 K/UL (ref 150–400)
PLATELET # BLD AUTO: 86 K/UL (ref 150–400)
PLATELET # BLD AUTO: 88 K/UL (ref 150–400)
PLATELET # BLD AUTO: 88 K/UL (ref 150–400)
PLATELET # BLD AUTO: 90 K/UL (ref 150–400)
PLATELET # BLD AUTO: 91 K/UL (ref 150–400)
PLATELET # BLD AUTO: 91 K/UL (ref 150–400)
PLATELET # BLD AUTO: 92 K/UL (ref 150–400)
PLATELET # BLD AUTO: 93 K/UL (ref 150–400)
PLATELET # BLD AUTO: 97 K/UL (ref 150–400)
PLATELET # BLD AUTO: 98 K/UL (ref 150–400)
PLATELET # BLD AUTO: 98 K/UL (ref 150–400)
PMV BLD AUTO: 10 FL (ref 8.9–12.9)
PMV BLD AUTO: 10.1 FL (ref 8.9–12.9)
PMV BLD AUTO: 10.2 FL (ref 8.9–12.9)
PMV BLD AUTO: 10.2 FL (ref 8.9–12.9)
PMV BLD AUTO: 10.3 FL (ref 8.9–12.9)
PMV BLD AUTO: 10.4 FL (ref 8.9–12.9)
PMV BLD AUTO: 10.5 FL (ref 8.9–12.9)
PMV BLD AUTO: 10.6 FL (ref 8.9–12.9)
PMV BLD AUTO: 10.7 FL (ref 8.9–12.9)
PMV BLD AUTO: 10.8 FL (ref 8.9–12.9)
PMV BLD AUTO: 10.9 FL (ref 8.9–12.9)
PMV BLD AUTO: 11 FL (ref 8.9–12.9)
PMV BLD AUTO: 11.2 FL (ref 8.9–12.9)
PMV BLD AUTO: 9 FL (ref 8.9–12.9)
PMV BLD AUTO: 9.1 FL (ref 8.9–12.9)
PMV BLD AUTO: 9.2 FL (ref 8.9–12.9)
PMV BLD AUTO: 9.3 FL (ref 8.9–12.9)
PMV BLD AUTO: 9.4 FL (ref 8.9–12.9)
PMV BLD AUTO: 9.5 FL (ref 8.9–12.9)
PMV BLD AUTO: 9.7 FL (ref 8.9–12.9)
PMV BLD AUTO: 9.8 FL (ref 8.9–12.9)
PMV BLD AUTO: 9.9 FL (ref 8.9–12.9)
PO2 BLD: 100 MMHG (ref 80–100)
PO2 BLD: 144 MMHG (ref 80–100)
PO2 BLD: 156 MMHG (ref 80–100)
PO2 BLD: 158 MMHG (ref 80–100)
PO2 BLD: 167 MMHG (ref 80–100)
PO2 BLD: 197 MMHG (ref 80–100)
PO2 BLD: 215 MMHG (ref 80–100)
PO2 BLD: 345 MMHG (ref 80–100)
PO2 BLD: 58 MMHG (ref 80–100)
PO2 BLD: 59 MMHG (ref 80–100)
PO2 BLD: 70 MMHG (ref 80–100)
PO2 BLD: 76 MMHG (ref 80–100)
PO2 BLD: 76 MMHG (ref 80–100)
PO2 BLD: 86 MMHG (ref 80–100)
PO2 BLD: 88 MMHG (ref 80–100)
PO2 BLD: 91 MMHG (ref 80–100)
PO2 BLD: 94 MMHG (ref 80–100)
PO2 BLDV: 23 MMHG (ref 25–40)
PO2 BLDV: 27 MMHG (ref 25–40)
PO2 BLDV: 28 MMHG (ref 25–40)
PO2 BLDV: 28 MMHG (ref 25–40)
PO2 BLDV: 29 MMHG (ref 25–40)
PO2 BLDV: 31 MMHG (ref 25–40)
PO2 BLDV: 32 MMHG (ref 25–40)
PO2 BLDV: 32 MMHG (ref 25–40)
PO2 BLDV: 33 MMHG (ref 25–40)
PO2 BLDV: 34 MMHG (ref 25–40)
PO2 BLDV: 35 MMHG (ref 25–40)
PO2 BLDV: 35 MMHG (ref 25–40)
PO2 BLDV: 36 MMHG (ref 25–40)
PO2 BLDV: 37 MMHG (ref 25–40)
PO2 BLDV: 37 MMHG (ref 25–40)
PO2 BLDV: 38 MMHG (ref 25–40)
PO2 BLDV: 38 MMHG (ref 25–40)
PO2 BLDV: 42 MMHG (ref 25–40)
POTASSIUM SERPL-SCNC: 3 MMOL/L (ref 3.5–5.1)
POTASSIUM SERPL-SCNC: 3 MMOL/L (ref 3.5–5.1)
POTASSIUM SERPL-SCNC: 3.1 MMOL/L (ref 3.5–5.1)
POTASSIUM SERPL-SCNC: 3.2 MMOL/L (ref 3.5–5.1)
POTASSIUM SERPL-SCNC: 3.3 MMOL/L (ref 3.5–5.1)
POTASSIUM SERPL-SCNC: 3.3 MMOL/L (ref 3.5–5.1)
POTASSIUM SERPL-SCNC: 3.4 MMOL/L (ref 3.5–5.1)
POTASSIUM SERPL-SCNC: 3.5 MMOL/L (ref 3.5–5.1)
POTASSIUM SERPL-SCNC: 3.6 MMOL/L (ref 3.5–5.1)
POTASSIUM SERPL-SCNC: 3.7 MMOL/L (ref 3.5–5.1)
POTASSIUM SERPL-SCNC: 3.8 MMOL/L (ref 3.5–5.1)
POTASSIUM SERPL-SCNC: 3.9 MMOL/L (ref 3.5–5.1)
POTASSIUM SERPL-SCNC: 4 MMOL/L (ref 3.5–5.1)
POTASSIUM SERPL-SCNC: 4.1 MMOL/L (ref 3.5–5.1)
POTASSIUM SERPL-SCNC: 4.2 MMOL/L (ref 3.5–5.1)
POTASSIUM SERPL-SCNC: 4.3 MMOL/L (ref 3.5–5.1)
POTASSIUM SERPL-SCNC: 4.3 MMOL/L (ref 3.5–5.1)
POTASSIUM SERPL-SCNC: 4.4 MMOL/L (ref 3.5–5.1)
POTASSIUM SERPL-SCNC: 4.5 MMOL/L (ref 3.5–5.1)
POTASSIUM SERPL-SCNC: 4.5 MMOL/L (ref 3.5–5.1)
POTASSIUM SERPL-SCNC: 4.6 MMOL/L (ref 3.5–5.1)
POTASSIUM SERPL-SCNC: 4.6 MMOL/L (ref 3.5–5.1)
POTASSIUM SERPL-SCNC: 4.7 MMOL/L (ref 3.5–5.1)
POTASSIUM SERPL-SCNC: 4.8 MMOL/L (ref 3.5–5.1)
POTASSIUM SERPL-SCNC: 4.9 MMOL/L (ref 3.5–5.1)
POTASSIUM SERPL-SCNC: 5.1 MMOL/L (ref 3.5–5.1)
PREALB SERPL-MCNC: 10.1 MG/DL (ref 20–40)
PREALB SERPL-MCNC: 10.3 MG/DL (ref 20–40)
PREALB SERPL-MCNC: 12.6 MG/DL (ref 20–40)
PREALB SERPL-MCNC: 13.6 MG/DL (ref 20–40)
PREALB SERPL-MCNC: 13.8 MG/DL (ref 20–40)
PREALB SERPL-MCNC: 16.2 MG/DL (ref 20–40)
PREALB SERPL-MCNC: 19.4 MG/DL (ref 20–40)
PREALB SERPL-MCNC: 20.1 MG/DL (ref 20–40)
PREALB SERPL-MCNC: 7.6 MG/DL (ref 20–40)
PRESSURE SUPPORT SETTING VENT: 5 CMH2O
PRESSURE SUPPORT SETTING VENT: 5 CMH2O
PROCALCITONIN SERPL-MCNC: 0.05 NG/ML
PROCALCITONIN SERPL-MCNC: 0.06 NG/ML
PROCALCITONIN SERPL-MCNC: 0.07 NG/ML
PROCALCITONIN SERPL-MCNC: 0.08 NG/ML
PROCALCITONIN SERPL-MCNC: 0.09 NG/ML
PROCALCITONIN SERPL-MCNC: 0.09 NG/ML
PROCALCITONIN SERPL-MCNC: 0.1 NG/ML
PROCALCITONIN SERPL-MCNC: 0.12 NG/ML
PROCALCITONIN SERPL-MCNC: 0.13 NG/ML
PROCALCITONIN SERPL-MCNC: 0.14 NG/ML
PROCALCITONIN SERPL-MCNC: 0.16 NG/ML
PROCALCITONIN SERPL-MCNC: 0.17 NG/ML
PROCALCITONIN SERPL-MCNC: 0.2 NG/ML
PROCALCITONIN SERPL-MCNC: 0.2 NG/ML
PROCALCITONIN SERPL-MCNC: 0.5 NG/ML
PROCALCITONIN SERPL-MCNC: 0.7 NG/ML
PROCALCITONIN SERPL-MCNC: 0.84 NG/ML
PROCALCITONIN SERPL-MCNC: 1 NG/ML
PROCALCITONIN SERPL-MCNC: 1.08 NG/ML
PROCALCITONIN SERPL-MCNC: 1.5 NG/ML
PROCALCITONIN SERPL-MCNC: 1.8 NG/ML
PROCALCITONIN SERPL-MCNC: 1.9 NG/ML
PROCALCITONIN SERPL-MCNC: <0.1 NG/ML
PROT C AG PPP IA-ACNC: 64 % (ref 60–150)
PROT S ACT/NOR PPP: 68 % (ref 63–140)
PROT SERPL-MCNC: 4.5 G/DL (ref 6.4–8.2)
PROT SERPL-MCNC: 4.8 G/DL (ref 6.4–8.2)
PROT SERPL-MCNC: 4.9 G/DL (ref 6.4–8.2)
PROT SERPL-MCNC: 5.2 G/DL (ref 6.4–8.2)
PROT SERPL-MCNC: 5.4 G/DL (ref 6.4–8.2)
PROT SERPL-MCNC: 5.5 G/DL (ref 6.4–8.2)
PROT SERPL-MCNC: 5.6 G/DL (ref 6.4–8.2)
PROT SERPL-MCNC: 5.7 G/DL (ref 6.4–8.2)
PROT SERPL-MCNC: 5.8 G/DL (ref 6.4–8.2)
PROT SERPL-MCNC: 5.9 G/DL (ref 6.4–8.2)
PROT SERPL-MCNC: 6 G/DL (ref 6.4–8.2)
PROT SERPL-MCNC: 6 G/DL (ref 6.4–8.2)
PROT SERPL-MCNC: 6.1 G/DL (ref 6.4–8.2)
PROT SERPL-MCNC: 6.2 G/DL (ref 6.4–8.2)
PROT SERPL-MCNC: 6.3 G/DL (ref 6.4–8.2)
PROT SERPL-MCNC: 6.4 G/DL (ref 6.4–8.2)
PROT SERPL-MCNC: 6.5 G/DL (ref 6.4–8.2)
PROT SERPL-MCNC: 6.6 G/DL (ref 6.4–8.2)
PROT SERPL-MCNC: 6.7 G/DL (ref 6.4–8.2)
PROT SERPL-MCNC: 6.7 G/DL (ref 6.4–8.2)
PROT SERPL-MCNC: 6.8 G/DL (ref 6.4–8.2)
PROT SERPL-MCNC: 6.9 G/DL (ref 6.4–8.2)
PROT SERPL-MCNC: 6.9 G/DL (ref 6.4–8.2)
PROT SERPL-MCNC: 7 G/DL (ref 6.4–8.2)
PROT UR STRIP-MCNC: 100 MG/DL
PROT UR STRIP-MCNC: 30 MG/DL
PROT UR STRIP-MCNC: >300 MG/DL
PROT UR STRIP-MCNC: NEGATIVE MG/DL
PROT UR STRIP-MCNC: NEGATIVE MG/DL
PROTHROMBIN TIME: 11.2 SEC (ref 9–11.1)
PROTHROMBIN TIME: 11.2 SEC (ref 9–11.1)
PROTHROMBIN TIME: 11.3 SEC (ref 9–11.1)
PROTHROMBIN TIME: 11.3 SEC (ref 9–11.1)
PROTHROMBIN TIME: 11.4 SEC (ref 9–11.1)
PROTHROMBIN TIME: 11.5 SEC (ref 9–11.1)
PROTHROMBIN TIME: 11.6 SEC (ref 9–11.1)
PROTHROMBIN TIME: 11.8 SEC (ref 9–11.1)
PROTHROMBIN TIME: 11.8 SEC (ref 9–11.1)
PROTHROMBIN TIME: 11.9 SEC (ref 9–11.1)
PROTHROMBIN TIME: 11.9 SEC (ref 9–11.1)
PROTHROMBIN TIME: 12.4 SEC (ref 9–11.1)
PROTHROMBIN TIME: 12.5 SEC (ref 9–11.1)
PROTHROMBIN TIME: 12.6 SEC (ref 9–11.1)
PROTHROMBIN TIME: 12.8 SEC (ref 9–11.1)
PROTHROMBIN TIME: 13 SEC (ref 9–11.1)
PROTHROMBIN TIME: 13 SEC (ref 9–11.1)
PROTHROMBIN TIME: 13.2 SEC (ref 9–11.1)
PROTHROMBIN TIME: 13.5 SEC (ref 9–11.1)
PROTHROMBIN TIME: 13.8 SEC (ref 9–11.1)
PROTHROMBIN TIME: 14 SEC (ref 9–11.1)
PROTHROMBIN TIME: 14.8 SEC (ref 9–11.1)
PROTHROMBIN TIME: 15.3 SEC (ref 9–11.1)
PROTHROMBIN TIME: 15.4 SEC (ref 9–11.1)
PROTHROMBIN TIME: 15.7 SEC (ref 9–11.1)
PROTHROMBIN TIME: 15.8 SEC (ref 9–11.1)
PROTHROMBIN TIME: 16.6 SEC (ref 9–11.1)
PROTHROMBIN TIME: 16.8 SEC (ref 9–11.1)
PROTHROMBIN TIME: 17.2 SEC (ref 9–11.1)
PROTHROMBIN TIME: 17.2 SEC (ref 9–11.1)
PROTHROMBIN TIME: 17.5 SEC (ref 9–11.1)
PROTHROMBIN TIME: 17.5 SEC (ref 9–11.1)
PROTHROMBIN TIME: 17.9 SEC (ref 9–11.1)
PROTHROMBIN TIME: 18.1 SEC (ref 9–11.1)
PROTHROMBIN TIME: 18.8 SEC (ref 9–11.1)
PROTHROMBIN TIME: 18.9 SEC (ref 9–11.1)
PROTHROMBIN TIME: 19 SEC (ref 9–11.1)
PROTHROMBIN TIME: 19.7 SEC (ref 9–11.1)
PROTHROMBIN TIME: 20.1 SEC (ref 9–11.1)
PROTHROMBIN TIME: 20.3 SEC (ref 9–11.1)
PROTHROMBIN TIME: 20.8 SEC (ref 9–11.1)
PROTHROMBIN TIME: 21.4 SEC (ref 9–11.1)
PROTHROMBIN TIME: 21.5 SEC (ref 9–11.1)
PROTHROMBIN TIME: 21.8 SEC (ref 9–11.1)
PROTHROMBIN TIME: 21.9 SEC (ref 9–11.1)
PROTHROMBIN TIME: 22 SEC (ref 9–11.1)
PROTHROMBIN TIME: 22.4 SEC (ref 9–11.1)
PROTHROMBIN TIME: 22.8 SEC (ref 9–11.1)
PROTHROMBIN TIME: 23.4 SEC (ref 9–11.1)
PROTHROMBIN TIME: 24.1 SEC (ref 9–11.1)
PROTHROMBIN TIME: 25.5 SEC (ref 9–11.1)
PROTHROMBIN TIME: 25.8 SEC (ref 9–11.1)
PROTHROMBIN TIME: 27.2 SEC (ref 9–11.1)
PROTHROMBIN TIME: 28.2 SEC (ref 9–11.1)
PROTHROMBIN TIME: 30.1 SEC (ref 9–11.1)
PROTHROMBIN TIME: 33.5 SEC (ref 9–11.1)
PROTHROMBIN TIME: 37.4 SEC (ref 9–11.1)
PROTHROMBIN TIME: 46.3 SEC (ref 9–11.1)
PSA SERPL-MCNC: 0.3 NG/ML (ref 0.01–4)
PULMONARY ARTERY END DIASTOLIC PRESSURE: 15.3 MMHG
PULMONARY ARTERY END DIASTOLIC PRESSURE: 22.6 MMHG
PULMONARY ARTERY MEAN PRESURE: 30.5 MMHG
PULMONARY ARTERY MEAN PRESURE: 39.3 MMHG
PV END DIASTOLIC VELOCITY: 1.2 MMHG
PV END DIASTOLIC VELOCITY: 1.4 MMHG
Q-T INTERVAL, ECG07: 420 MS
Q-T INTERVAL, ECG07: 432 MS
Q-T INTERVAL, ECG07: 438 MS
Q-T INTERVAL, ECG07: 444 MS
Q-T INTERVAL, ECG07: 448 MS
Q-T INTERVAL, ECG07: 462 MS
Q-T INTERVAL, ECG07: 488 MS
Q-T INTERVAL, ECG07: 502 MS
Q-T INTERVAL, ECG07: 504 MS
Q-T INTERVAL, ECG07: 506 MS
Q-T INTERVAL, ECG07: 524 MS
Q-T INTERVAL, ECG07: 564 MS
QRS DURATION, ECG06: 102 MS
QRS DURATION, ECG06: 108 MS
QRS DURATION, ECG06: 114 MS
QRS DURATION, ECG06: 116 MS
QRS DURATION, ECG06: 134 MS
QRS DURATION, ECG06: 148 MS
QRS DURATION, ECG06: 156 MS
QRS DURATION, ECG06: 158 MS
QRS DURATION, ECG06: 162 MS
QRS DURATION, ECG06: 166 MS
QRS DURATION, ECG06: 174 MS
QRS DURATION, ECG06: 98 MS
QTC CALCULATION (BEZET), ECG08: 478 MS
QTC CALCULATION (BEZET), ECG08: 486 MS
QTC CALCULATION (BEZET), ECG08: 489 MS
QTC CALCULATION (BEZET), ECG08: 532 MS
QTC CALCULATION (BEZET), ECG08: 541 MS
QTC CALCULATION (BEZET), ECG08: 549 MS
QTC CALCULATION (BEZET), ECG08: 554 MS
QTC CALCULATION (BEZET), ECG08: 559 MS
QTC CALCULATION (BEZET), ECG08: 580 MS
QTC CALCULATION (BEZET), ECG08: 589 MS
QTC CALCULATION (BEZET), ECG08: 634 MS
QTC CALCULATION (BEZET), ECG08: 644 MS
RBC # BLD AUTO: 1.98 M/UL (ref 4.1–5.7)
RBC # BLD AUTO: 2.34 M/UL (ref 4.1–5.7)
RBC # BLD AUTO: 2.35 M/UL (ref 4.1–5.7)
RBC # BLD AUTO: 2.37 M/UL (ref 4.1–5.7)
RBC # BLD AUTO: 2.43 M/UL (ref 4.1–5.7)
RBC # BLD AUTO: 2.48 M/UL (ref 4.1–5.7)
RBC # BLD AUTO: 2.49 M/UL (ref 4.1–5.7)
RBC # BLD AUTO: 2.52 M/UL (ref 4.1–5.7)
RBC # BLD AUTO: 2.56 M/UL (ref 4.1–5.7)
RBC # BLD AUTO: 2.62 M/UL (ref 4.1–5.7)
RBC # BLD AUTO: 2.63 M/UL (ref 4.1–5.7)
RBC # BLD AUTO: 2.64 M/UL (ref 4.1–5.7)
RBC # BLD AUTO: 2.66 M/UL (ref 4.1–5.7)
RBC # BLD AUTO: 2.67 M/UL (ref 4.1–5.7)
RBC # BLD AUTO: 2.68 M/UL (ref 4.1–5.7)
RBC # BLD AUTO: 2.69 M/UL (ref 4.1–5.7)
RBC # BLD AUTO: 2.7 M/UL (ref 4.1–5.7)
RBC # BLD AUTO: 2.74 M/UL (ref 4.1–5.7)
RBC # BLD AUTO: 2.76 M/UL (ref 4.1–5.7)
RBC # BLD AUTO: 2.79 M/UL (ref 4.1–5.7)
RBC # BLD AUTO: 2.79 M/UL (ref 4.1–5.7)
RBC # BLD AUTO: 2.8 M/UL (ref 4.1–5.7)
RBC # BLD AUTO: 2.82 M/UL (ref 4.1–5.7)
RBC # BLD AUTO: 2.83 M/UL (ref 4.1–5.7)
RBC # BLD AUTO: 2.83 M/UL (ref 4.1–5.7)
RBC # BLD AUTO: 2.84 M/UL (ref 4.1–5.7)
RBC # BLD AUTO: 2.84 M/UL (ref 4.1–5.7)
RBC # BLD AUTO: 2.85 M/UL (ref 4.1–5.7)
RBC # BLD AUTO: 2.85 M/UL (ref 4.1–5.7)
RBC # BLD AUTO: 2.86 M/UL (ref 4.1–5.7)
RBC # BLD AUTO: 2.88 M/UL (ref 4.1–5.7)
RBC # BLD AUTO: 2.89 M/UL (ref 4.1–5.7)
RBC # BLD AUTO: 2.89 M/UL (ref 4.1–5.7)
RBC # BLD AUTO: 2.91 M/UL (ref 4.1–5.7)
RBC # BLD AUTO: 2.92 M/UL (ref 4.1–5.7)
RBC # BLD AUTO: 2.93 M/UL (ref 4.1–5.7)
RBC # BLD AUTO: 2.95 M/UL (ref 4.1–5.7)
RBC # BLD AUTO: 3 M/UL (ref 4.1–5.7)
RBC # BLD AUTO: 3.03 M/UL (ref 4.1–5.7)
RBC # BLD AUTO: 3.03 M/UL (ref 4.1–5.7)
RBC # BLD AUTO: 3.07 M/UL (ref 4.1–5.7)
RBC # BLD AUTO: 3.11 M/UL (ref 4.1–5.7)
RBC # BLD AUTO: 3.12 M/UL (ref 4.1–5.7)
RBC # BLD AUTO: 3.13 M/UL (ref 4.1–5.7)
RBC # BLD AUTO: 3.15 M/UL (ref 4.1–5.7)
RBC # BLD AUTO: 3.16 M/UL (ref 4.1–5.7)
RBC # BLD AUTO: 3.35 M/UL (ref 4.1–5.7)
RBC # BLD AUTO: 3.39 M/UL (ref 4.1–5.7)
RBC # BLD AUTO: 3.42 M/UL (ref 4.1–5.7)
RBC # BLD AUTO: 3.46 X10E6/UL (ref 4.14–5.8)
RBC # BLD AUTO: 3.47 M/UL (ref 4.1–5.7)
RBC # BLD AUTO: 3.48 M/UL (ref 4.1–5.7)
RBC # BLD AUTO: 3.48 M/UL (ref 4.1–5.7)
RBC # BLD AUTO: 3.62 M/UL (ref 4.1–5.7)
RBC #/AREA URNS HPF: >100 /HPF (ref 0–5)
RBC #/AREA URNS HPF: >100 /HPF (ref 0–5)
RBC #/AREA URNS HPF: ABNORMAL /HPF (ref 0–5)
RBC MORPH BLD: ABNORMAL
RIGHT ABI: 1.2
RIGHT ANTERIOR TIBIAL: 120 MMHG
RIGHT CCA DIST DIAS: 16.6 CM/S
RIGHT CCA DIST SYS: 46.2 CM/S
RIGHT CCA PROX DIAS: 10.6 CM/S
RIGHT CCA PROX SYS: 43 CM/S
RIGHT DIST RADIAL A PSV: 28.3 CM/S
RIGHT DIST ULNAR A PSV: 20.2 CM/S
RIGHT ECA DIAS: 2.35 CM/S
RIGHT ECA SYS: 42.9 CM/S
RIGHT ICA DIST DIAS: 11.1 CM/S
RIGHT ICA DIST SYS: 27.6 CM/S
RIGHT ICA MID DIAS: 15.1 CM/S
RIGHT ICA MID SYS: 44.8 CM/S
RIGHT ICA PROX DIAS: 33.4 CM/S
RIGHT ICA PROX SYS: 72.7 CM/S
RIGHT ICA/CCA SYS: 1.6
RIGHT POSTERIOR TIBIAL: 117 MMHG
RIGHT PROX BRACHIAL A EDV: 17 CM/S
RIGHT PROX BRACHIAL A PSV: 23.4 CM/S
RIGHT VERTEBRAL DIAS: 12.77 CM/S
RIGHT VERTEBRAL SYS: 36.3 CM/S
SAMPLES BEING HELD,HOLD: NORMAL
SAO2 % BLD: 100 % (ref 92–97)
SAO2 % BLD: 92 % (ref 92–97)
SAO2 % BLD: 93 % (ref 92–97)
SAO2 % BLD: 95 % (ref 92–97)
SAO2 % BLD: 96 % (ref 92–97)
SAO2 % BLD: 96 % (ref 92–97)
SAO2 % BLD: 97 % (ref 92–97)
SAO2 % BLD: 98 % (ref 92–97)
SAO2 % BLD: 98 % (ref 92–97)
SAO2 % BLD: 99 % (ref 92–97)
SAO2 % BLD: 99 % (ref 92–97)
SAO2 % BLDV: 41 % (ref 65–88)
SAO2 % BLDV: 52 % (ref 65–88)
SAO2 % BLDV: 55 % (ref 65–88)
SAO2 % BLDV: 56 % (ref 65–88)
SAO2 % BLDV: 56 % (ref 65–88)
SAO2 % BLDV: 59 % (ref 65–88)
SAO2 % BLDV: 59 % (ref 65–88)
SAO2 % BLDV: 61 % (ref 65–88)
SAO2 % BLDV: 62 % (ref 65–88)
SAO2 % BLDV: 63 % (ref 65–88)
SAO2 % BLDV: 64 % (ref 65–88)
SAO2 % BLDV: 65 % (ref 65–88)
SAO2 % BLDV: 67 % (ref 65–88)
SAO2 % BLDV: 67 % (ref 65–88)
SAO2 % BLDV: 70 % (ref 65–88)
SAO2 % BLDV: 70 % (ref 65–88)
SAO2 % BLDV: 71 % (ref 65–88)
SAO2 % BLDV: 73 % (ref 65–88)
SAO2 % BLDV: 74 % (ref 65–88)
SAO2 % BLDV: 75 % (ref 65–88)
SCREEN APTT: 42 SEC (ref 0–51.9)
SCREEN DRVVT: 141.2 SEC (ref 0–47)
SERVICE CMNT-IMP: 4 L/MIN
SERVICE CMNT-IMP: ABNORMAL
SERVICE CMNT-IMP: NORMAL
SODIUM SERPL-SCNC: 124 MMOL/L (ref 136–145)
SODIUM SERPL-SCNC: 128 MMOL/L (ref 136–145)
SODIUM SERPL-SCNC: 129 MMOL/L (ref 136–145)
SODIUM SERPL-SCNC: 130 MMOL/L (ref 136–145)
SODIUM SERPL-SCNC: 131 MMOL/L (ref 136–145)
SODIUM SERPL-SCNC: 132 MMOL/L (ref 136–145)
SODIUM SERPL-SCNC: 133 MMOL/L (ref 136–145)
SODIUM SERPL-SCNC: 134 MMOL/L (ref 136–145)
SODIUM SERPL-SCNC: 135 MMOL/L (ref 136–145)
SODIUM SERPL-SCNC: 136 MMOL/L (ref 136–145)
SODIUM SERPL-SCNC: 137 MMOL/L (ref 136–145)
SODIUM SERPL-SCNC: 138 MMOL/L (ref 136–145)
SODIUM SERPL-SCNC: 140 MMOL/L (ref 136–145)
SODIUM SERPL-SCNC: 141 MMOL/L (ref 136–145)
SODIUM SERPL-SCNC: 142 MMOL/L (ref 136–145)
SODIUM SERPL-SCNC: 143 MMOL/L (ref 136–145)
SODIUM SERPL-SCNC: 147 MMOL/L (ref 136–145)
SODIUM SERPL-SCNC: 147 MMOL/L (ref 136–145)
SODIUM UR-SCNC: 37 MMOL/L
SP GR UR REFRACTOMETRY: 1 (ref 1–1.03)
SP GR UR REFRACTOMETRY: 1.01 (ref 1–1.03)
SP GR UR REFRACTOMETRY: <1.005 (ref 1–1.03)
SPECIMEN EXP DATE BLD: NORMAL
SPECIMEN TYPE: ABNORMAL
SPECIMEN TYPE: NORMAL
SPECIMEN TYPE: NORMAL
SRA 100IU/ML UFH SER-ACNC: 1 % (ref 0–20)
SRA UFH SER-IMP: NORMAL
STATUS OF UNIT,%ST: NORMAL
T3FREE SERPL-MCNC: 1.3 PG/ML (ref 2.2–4)
T4 FREE SERPL-MCNC: 1.5 NG/DL (ref 0.8–1.5)
T4 FREE SERPL-MCNC: 1.7 NG/DL (ref 0.8–1.5)
THERAPEUTIC RANGE,PTTT: ABNORMAL SECS (ref 58–77)
THERAPEUTIC RANGE,PTTT: NORMAL SECS (ref 58–77)
TIBC SERPL-MCNC: 119 UG/DL (ref 250–450)
TIBC SERPL-MCNC: 159 UG/DL (ref 250–450)
TIBC SERPL-MCNC: 176 UG/DL (ref 250–450)
TIBC SERPL-MCNC: 196 UG/DL (ref 250–450)
TIBC SERPL-MCNC: 324 UG/DL (ref 250–450)
TOTAL RESP. RATE, ITRR: 12
TOTAL RESP. RATE, ITRR: 14
TOTAL RESP. RATE, ITRR: 16
TOTAL RESP. RATE, ITRR: 17
TOTAL RESP. RATE, ITRR: 18
TOTAL RESP. RATE, ITRR: 19
TOTAL RESP. RATE, ITRR: 20
TOTAL RESP. RATE, ITRR: 21
TOTAL RESP. RATE, ITRR: 21
TOTAL RESP. RATE, ITRR: 22
TOTAL RESP. RATE, ITRR: 23
TOTAL RESP. RATE, ITRR: 24
TOTAL RESP. RATE, ITRR: 26
TRIGL SERPL-MCNC: 64 MG/DL (ref ?–150)
TROPONIN I SERPL-MCNC: 0.08 NG/ML
TROPONIN I SERPL-MCNC: 0.19 NG/ML
TROPONIN I SERPL-MCNC: <0.05 NG/ML
TSH SERPL DL<=0.05 MIU/L-ACNC: 2.3 UIU/ML (ref 0.36–3.74)
TSH SERPL DL<=0.05 MIU/L-ACNC: 2.4 UIU/ML (ref 0.36–3.74)
UA: UC IF INDICATED,UAUC: ABNORMAL
UA: UC IF INDICATED,UAUC: ABNORMAL
UNFRAC HEPARIN LOW DOSE: 1 % (ref 0–20)
UNIT DIVISION, %UDIV: 0
UR CULT HOLD, URHOLD: NORMAL
URATE SERPL-MCNC: 10.6 MG/DL (ref 3.5–7.2)
URATE SERPL-MCNC: 3.6 MG/DL (ref 3.5–7.2)
URATE SERPL-MCNC: 4.4 MG/DL (ref 3.5–7.2)
UROBILINOGEN UR QL STRIP.AUTO: 0.2 EU/DL (ref 0.2–1)
UROBILINOGEN UR QL STRIP.AUTO: 1 EU/DL (ref 0.2–1)
UROBILINOGEN UR QL STRIP.AUTO: 2 EU/DL (ref 0.2–1)
VENTILATION MODE VENT: ABNORMAL
VENTRICULAR RATE, ECG03: 101 BPM
VENTRICULAR RATE, ECG03: 72 BPM
VENTRICULAR RATE, ECG03: 74 BPM
VENTRICULAR RATE, ECG03: 74 BPM
VENTRICULAR RATE, ECG03: 75 BPM
VENTRICULAR RATE, ECG03: 76 BPM
VENTRICULAR RATE, ECG03: 76 BPM
VENTRICULAR RATE, ECG03: 78 BPM
VENTRICULAR RATE, ECG03: 80 BPM
VENTRICULAR RATE, ECG03: 83 BPM
VENTRICULAR RATE, ECG03: 91 BPM
VENTRICULAR RATE, ECG03: 92 BPM
VLDLC SERPL CALC-MCNC: 12.8 MG/DL
VT SETTING VENT: 450 ML
VT SETTING VENT: 450 ML
VT SETTING VENT: 550 ML
VWF AG ACT/NOR PPP IA: 254 % (ref 50–200)
VWF:RCO ACT/NOR PPP PL AGG: 210 % (ref 50–200)
WBC # BLD AUTO: 3.7 K/UL (ref 4.1–11.1)
WBC # BLD AUTO: 3.8 K/UL (ref 4.1–11.1)
WBC # BLD AUTO: 3.9 K/UL (ref 4.1–11.1)
WBC # BLD AUTO: 3.9 K/UL (ref 4.1–11.1)
WBC # BLD AUTO: 4 K/UL (ref 4.1–11.1)
WBC # BLD AUTO: 4.1 K/UL (ref 4.1–11.1)
WBC # BLD AUTO: 4.2 K/UL (ref 4.1–11.1)
WBC # BLD AUTO: 4.3 K/UL (ref 4.1–11.1)
WBC # BLD AUTO: 4.4 K/UL (ref 4.1–11.1)
WBC # BLD AUTO: 4.5 K/UL (ref 4.1–11.1)
WBC # BLD AUTO: 4.6 K/UL (ref 4.1–11.1)
WBC # BLD AUTO: 4.7 K/UL (ref 4.1–11.1)
WBC # BLD AUTO: 4.8 K/UL (ref 4.1–11.1)
WBC # BLD AUTO: 4.8 K/UL (ref 4.1–11.1)
WBC # BLD AUTO: 4.9 K/UL (ref 4.1–11.1)
WBC # BLD AUTO: 5 K/UL (ref 4.1–11.1)
WBC # BLD AUTO: 5.1 K/UL (ref 4.1–11.1)
WBC # BLD AUTO: 5.2 K/UL (ref 4.1–11.1)
WBC # BLD AUTO: 5.3 K/UL (ref 4.1–11.1)
WBC # BLD AUTO: 5.3 K/UL (ref 4.1–11.1)
WBC # BLD AUTO: 5.5 K/UL (ref 4.1–11.1)
WBC # BLD AUTO: 5.5 K/UL (ref 4.1–11.1)
WBC # BLD AUTO: 5.7 K/UL (ref 4.1–11.1)
WBC # BLD AUTO: 5.8 K/UL (ref 4.1–11.1)
WBC # BLD AUTO: 6 K/UL (ref 4.1–11.1)
WBC # BLD AUTO: 6.2 K/UL (ref 4.1–11.1)
WBC # BLD AUTO: 6.2 K/UL (ref 4.1–11.1)
WBC # BLD AUTO: 6.3 K/UL (ref 4.1–11.1)
WBC # BLD AUTO: 6.5 K/UL (ref 4.1–11.1)
WBC # BLD AUTO: 6.6 K/UL (ref 4.1–11.1)
WBC # BLD AUTO: 6.9 K/UL (ref 4.1–11.1)
WBC # BLD AUTO: 6.9 K/UL (ref 4.1–11.1)
WBC # BLD AUTO: 7 K/UL (ref 4.1–11.1)
WBC # BLD AUTO: 7 K/UL (ref 4.1–11.1)
WBC # BLD AUTO: 7.1 K/UL (ref 4.1–11.1)
WBC # BLD AUTO: 7.3 K/UL (ref 4.1–11.1)
WBC # BLD AUTO: 7.4 K/UL (ref 4.1–11.1)
WBC # BLD AUTO: 7.5 K/UL (ref 4.1–11.1)
WBC # BLD AUTO: 7.9 K/UL (ref 4.1–11.1)
WBC # BLD AUTO: 8.4 K/UL (ref 4.1–11.1)
WBC URNS QL MICRO: >100 /HPF (ref 0–4)
WBC URNS QL MICRO: ABNORMAL /HPF (ref 0–4)

## 2019-01-01 PROCEDURE — 85730 THROMBOPLASTIN TIME PARTIAL: CPT

## 2019-01-01 PROCEDURE — 83615 LACTATE (LD) (LDH) ENZYME: CPT

## 2019-01-01 PROCEDURE — 71045 X-RAY EXAM CHEST 1 VIEW: CPT

## 2019-01-01 PROCEDURE — 77030018836 HC SOL IRR NACL ICUM -A

## 2019-01-01 PROCEDURE — 74011636637 HC RX REV CODE- 636/637: Performed by: NURSE PRACTITIONER

## 2019-01-01 PROCEDURE — 74011250637 HC RX REV CODE- 250/637: Performed by: INTERNAL MEDICINE

## 2019-01-01 PROCEDURE — 99233 SBSQ HOSP IP/OBS HIGH 50: CPT | Performed by: INTERNAL MEDICINE

## 2019-01-01 PROCEDURE — 77030012893

## 2019-01-01 PROCEDURE — 85610 PROTHROMBIN TIME: CPT

## 2019-01-01 PROCEDURE — 85027 COMPLETE CBC AUTOMATED: CPT

## 2019-01-01 PROCEDURE — 74011000250 HC RX REV CODE- 250

## 2019-01-01 PROCEDURE — 80053 COMPREHEN METABOLIC PANEL: CPT

## 2019-01-01 PROCEDURE — 74011250636 HC RX REV CODE- 250/636: Performed by: THORACIC SURGERY (CARDIOTHORACIC VASCULAR SURGERY)

## 2019-01-01 PROCEDURE — 65660000001 HC RM ICU INTERMED STEPDOWN

## 2019-01-01 PROCEDURE — 83605 ASSAY OF LACTIC ACID: CPT

## 2019-01-01 PROCEDURE — 87077 CULTURE AEROBIC IDENTIFY: CPT

## 2019-01-01 PROCEDURE — 85018 HEMOGLOBIN: CPT

## 2019-01-01 PROCEDURE — 82803 BLOOD GASES ANY COMBINATION: CPT

## 2019-01-01 PROCEDURE — 83880 ASSAY OF NATRIURETIC PEPTIDE: CPT

## 2019-01-01 PROCEDURE — 74011250637 HC RX REV CODE- 250/637: Performed by: UROLOGY

## 2019-01-01 PROCEDURE — 36415 COLL VENOUS BLD VENIPUNCTURE: CPT

## 2019-01-01 PROCEDURE — 87040 BLOOD CULTURE FOR BACTERIA: CPT

## 2019-01-01 PROCEDURE — 36430 TRANSFUSION BLD/BLD COMPNT: CPT

## 2019-01-01 PROCEDURE — 74011250637 HC RX REV CODE- 250/637: Performed by: NURSE PRACTITIONER

## 2019-01-01 PROCEDURE — 82962 GLUCOSE BLOOD TEST: CPT

## 2019-01-01 PROCEDURE — 74011250636 HC RX REV CODE- 250/636: Performed by: NURSE PRACTITIONER

## 2019-01-01 PROCEDURE — 86922 COMPATIBILITY TEST ANTIGLOB: CPT

## 2019-01-01 PROCEDURE — P9047 ALBUMIN (HUMAN), 25%, 50ML: HCPCS | Performed by: NURSE PRACTITIONER

## 2019-01-01 PROCEDURE — 97530 THERAPEUTIC ACTIVITIES: CPT

## 2019-01-01 PROCEDURE — 74011000258 HC RX REV CODE- 258: Performed by: INTERNAL MEDICINE

## 2019-01-01 PROCEDURE — 36592 COLLECT BLOOD FROM PICC: CPT

## 2019-01-01 PROCEDURE — 74011250637 HC RX REV CODE- 250/637

## 2019-01-01 PROCEDURE — 74011250636 HC RX REV CODE- 250/636: Performed by: NURSE ANESTHETIST, CERTIFIED REGISTERED

## 2019-01-01 PROCEDURE — 36600 WITHDRAWAL OF ARTERIAL BLOOD: CPT

## 2019-01-01 PROCEDURE — 74011250636 HC RX REV CODE- 250/636: Performed by: INTERNAL MEDICINE

## 2019-01-01 PROCEDURE — 93005 ELECTROCARDIOGRAM TRACING: CPT

## 2019-01-01 PROCEDURE — 74011000250 HC RX REV CODE- 250: Performed by: THORACIC SURGERY (CARDIOTHORACIC VASCULAR SURGERY)

## 2019-01-01 PROCEDURE — 93461 R&L HRT ART/VENTRICLE ANGIO: CPT | Performed by: INTERNAL MEDICINE

## 2019-01-01 PROCEDURE — 77010033678 HC OXYGEN DAILY

## 2019-01-01 PROCEDURE — 74011000258 HC RX REV CODE- 258: Performed by: NURSE PRACTITIONER

## 2019-01-01 PROCEDURE — 84134 ASSAY OF PREALBUMIN: CPT

## 2019-01-01 PROCEDURE — P9045 ALBUMIN (HUMAN), 5%, 250 ML: HCPCS | Performed by: NURSE PRACTITIONER

## 2019-01-01 PROCEDURE — 77030019927 HC TBNG IRR CYSTO BAXT -A

## 2019-01-01 PROCEDURE — 74011000258 HC RX REV CODE- 258: Performed by: PSYCHIATRY & NEUROLOGY

## 2019-01-01 PROCEDURE — 80162 ASSAY OF DIGOXIN TOTAL: CPT

## 2019-01-01 PROCEDURE — 74011000250 HC RX REV CODE- 250: Performed by: NURSE PRACTITIONER

## 2019-01-01 PROCEDURE — 03C80ZZ EXTIRPATION OF MATTER FROM LEFT BRACHIAL ARTERY, OPEN APPROACH: ICD-10-PCS

## 2019-01-01 PROCEDURE — 84145 PROCALCITONIN (PCT): CPT

## 2019-01-01 PROCEDURE — 77030037442 HC TY LVAD MGMT SYST CMP-B

## 2019-01-01 PROCEDURE — 74011000272 HC RX REV CODE- 272: Performed by: THORACIC SURGERY (CARDIOTHORACIC VASCULAR SURGERY)

## 2019-01-01 PROCEDURE — 77030002996 HC SUT SLK J&J -A: Performed by: THORACIC SURGERY (CARDIOTHORACIC VASCULAR SURGERY)

## 2019-01-01 PROCEDURE — 76010000138 HC OR TIME 0.5 TO 1 HR: Performed by: UROLOGY

## 2019-01-01 PROCEDURE — 74011000250 HC RX REV CODE- 250: Performed by: INTERNAL MEDICINE

## 2019-01-01 PROCEDURE — 86900 BLOOD TYPING SEROLOGIC ABO: CPT

## 2019-01-01 PROCEDURE — C9113 INJ PANTOPRAZOLE SODIUM, VIA: HCPCS | Performed by: NURSE PRACTITIONER

## 2019-01-01 PROCEDURE — 83735 ASSAY OF MAGNESIUM: CPT

## 2019-01-01 PROCEDURE — 80048 BASIC METABOLIC PNL TOTAL CA: CPT

## 2019-01-01 PROCEDURE — 94640 AIRWAY INHALATION TREATMENT: CPT

## 2019-01-01 PROCEDURE — 74011636637 HC RX REV CODE- 636/637: Performed by: INTERNAL MEDICINE

## 2019-01-01 PROCEDURE — 86921 COMPATIBILITY TEST INCUBATE: CPT

## 2019-01-01 PROCEDURE — 3C1ZX8Z IRRIGATION OF INDWELLING DEVICE USING IRRIGATING SUBSTANCE, EXTERNAL APPROACH: ICD-10-PCS | Performed by: THORACIC SURGERY (CARDIOTHORACIC VASCULAR SURGERY)

## 2019-01-01 PROCEDURE — C1758 CATHETER, URETERAL: HCPCS

## 2019-01-01 PROCEDURE — 65610000003 HC RM ICU SURGICAL

## 2019-01-01 PROCEDURE — C1894 INTRO/SHEATH, NON-LASER: HCPCS | Performed by: THORACIC SURGERY (CARDIOTHORACIC VASCULAR SURGERY)

## 2019-01-01 PROCEDURE — 76060000033 HC ANESTHESIA 1 TO 1.5 HR: Performed by: UROLOGY

## 2019-01-01 PROCEDURE — 94003 VENT MGMT INPAT SUBQ DAY: CPT

## 2019-01-01 PROCEDURE — 93750 INTERROGATION VAD IN PERSON: CPT | Performed by: INTERNAL MEDICINE

## 2019-01-01 PROCEDURE — 82378 CARCINOEMBRYONIC ANTIGEN: CPT

## 2019-01-01 PROCEDURE — 77030011640 HC PAD GRND REM COVD -A

## 2019-01-01 PROCEDURE — 97116 GAIT TRAINING THERAPY: CPT

## 2019-01-01 PROCEDURE — 77030008684 HC TU ET CUF COVD -B: Performed by: ANESTHESIOLOGY

## 2019-01-01 PROCEDURE — 83540 ASSAY OF IRON: CPT

## 2019-01-01 PROCEDURE — 87385 HISTOPLASMA CAPSUL AG IA: CPT

## 2019-01-01 PROCEDURE — P9035 PLATELET PHERES LEUKOREDUCED: HCPCS

## 2019-01-01 PROCEDURE — P9016 RBC LEUKOCYTES REDUCED: HCPCS

## 2019-01-01 PROCEDURE — 65620000000 HC RM CCU GENERAL

## 2019-01-01 PROCEDURE — 93308 TTE F-UP OR LMTD: CPT

## 2019-01-01 PROCEDURE — 84100 ASSAY OF PHOSPHORUS: CPT

## 2019-01-01 PROCEDURE — 99291 CRITICAL CARE FIRST HOUR: CPT | Performed by: INTERNAL MEDICINE

## 2019-01-01 PROCEDURE — 77030013469: Performed by: THORACIC SURGERY (CARDIOTHORACIC VASCULAR SURGERY)

## 2019-01-01 PROCEDURE — 77030029684 HC NEB SM VOL KT MONA -A

## 2019-01-01 PROCEDURE — 86880 COOMBS TEST DIRECT: CPT

## 2019-01-01 PROCEDURE — 77030006247 HC LD PCMKR MYOCRD BPLR TEMP MEDT -B: Performed by: THORACIC SURGERY (CARDIOTHORACIC VASCULAR SURGERY)

## 2019-01-01 PROCEDURE — 82272 OCCULT BLD FECES 1-3 TESTS: CPT

## 2019-01-01 PROCEDURE — 74230 X-RAY XM SWLNG FUNCJ C+: CPT

## 2019-01-01 PROCEDURE — 86920 COMPATIBILITY TEST SPIN: CPT

## 2019-01-01 PROCEDURE — 84300 ASSAY OF URINE SODIUM: CPT

## 2019-01-01 PROCEDURE — C1751 CATH, INF, PER/CENT/MIDLINE: HCPCS | Performed by: THORACIC SURGERY (CARDIOTHORACIC VASCULAR SURGERY)

## 2019-01-01 PROCEDURE — 93306 TTE W/DOPPLER COMPLETE: CPT

## 2019-01-01 PROCEDURE — 99232 SBSQ HOSP IP/OBS MODERATE 35: CPT | Performed by: INTERNAL MEDICINE

## 2019-01-01 PROCEDURE — 74011250636 HC RX REV CODE- 250/636: Performed by: ANESTHESIOLOGY

## 2019-01-01 PROCEDURE — 85302 CLOT INHIBIT PROT C ANTIGEN: CPT

## 2019-01-01 PROCEDURE — 77030018846 HC SOL IRR STRL H20 ICUM -A: Performed by: THORACIC SURGERY (CARDIOTHORACIC VASCULAR SURGERY)

## 2019-01-01 PROCEDURE — 36593 DECLOT VASCULAR DEVICE: CPT

## 2019-01-01 PROCEDURE — B2131ZZ FLUOROSCOPY OF MULTIPLE CORONARY ARTERY BYPASS GRAFTS USING LOW OSMOLAR CONTRAST: ICD-10-PCS | Performed by: INTERNAL MEDICINE

## 2019-01-01 PROCEDURE — 30233L1 TRANSFUSION OF NONAUTOLOGOUS FRESH PLASMA INTO PERIPHERAL VEIN, PERCUTANEOUS APPROACH: ICD-10-PCS | Performed by: INTERNAL MEDICINE

## 2019-01-01 PROCEDURE — 82728 ASSAY OF FERRITIN: CPT

## 2019-01-01 PROCEDURE — C1751 CATH, INF, PER/CENT/MIDLINE: HCPCS

## 2019-01-01 PROCEDURE — 74011250637 HC RX REV CODE- 250/637: Performed by: THORACIC SURGERY (CARDIOTHORACIC VASCULAR SURGERY)

## 2019-01-01 PROCEDURE — 83935 ASSAY OF URINE OSMOLALITY: CPT

## 2019-01-01 PROCEDURE — 81001 URINALYSIS AUTO W/SCOPE: CPT

## 2019-01-01 PROCEDURE — P9045 ALBUMIN (HUMAN), 5%, 250 ML: HCPCS | Performed by: INTERNAL MEDICINE

## 2019-01-01 PROCEDURE — 77030003020 HC SUT TICRN COVD -A: Performed by: THORACIC SURGERY (CARDIOTHORACIC VASCULAR SURGERY)

## 2019-01-01 PROCEDURE — 97535 SELF CARE MNGMENT TRAINING: CPT

## 2019-01-01 PROCEDURE — 83036 HEMOGLOBIN GLYCOSYLATED A1C: CPT

## 2019-01-01 PROCEDURE — 74011000258 HC RX REV CODE- 258: Performed by: THORACIC SURGERY (CARDIOTHORACIC VASCULAR SURGERY)

## 2019-01-01 PROCEDURE — C1769 GUIDE WIRE: HCPCS | Performed by: THORACIC SURGERY (CARDIOTHORACIC VASCULAR SURGERY)

## 2019-01-01 PROCEDURE — 86803 HEPATITIS C AB TEST: CPT

## 2019-01-01 PROCEDURE — 84484 ASSAY OF TROPONIN QUANT: CPT

## 2019-01-01 PROCEDURE — 30233K1 TRANSFUSION OF NONAUTOLOGOUS FROZEN PLASMA INTO PERIPHERAL VEIN, PERCUTANEOUS APPROACH: ICD-10-PCS | Performed by: INTERNAL MEDICINE

## 2019-01-01 PROCEDURE — C1713 ANCHOR/SCREW BN/BN,TIS/BN: HCPCS | Performed by: THORACIC SURGERY (CARDIOTHORACIC VASCULAR SURGERY)

## 2019-01-01 PROCEDURE — 99152 MOD SED SAME PHYS/QHP 5/>YRS: CPT | Performed by: INTERNAL MEDICINE

## 2019-01-01 PROCEDURE — 77030038607 HC BTRY BACKUP VAD PKT CNTRL HRTMT III STJU -K1: Performed by: THORACIC SURGERY (CARDIOTHORACIC VASCULAR SURGERY)

## 2019-01-01 PROCEDURE — 77030013797 HC KT TRNSDUC PRSSR EDWD -A: Performed by: INTERNAL MEDICINE

## 2019-01-01 PROCEDURE — 86141 C-REACTIVE PROTEIN HS: CPT

## 2019-01-01 PROCEDURE — 30233N1 TRANSFUSION OF NONAUTOLOGOUS RED BLOOD CELLS INTO PERIPHERAL VEIN, PERCUTANEOUS APPROACH: ICD-10-PCS | Performed by: INTERNAL MEDICINE

## 2019-01-01 PROCEDURE — 77030014008 HC SPNG HEMSTAT J&J -C: Performed by: THORACIC SURGERY (CARDIOTHORACIC VASCULAR SURGERY)

## 2019-01-01 PROCEDURE — 86850 RBC ANTIBODY SCREEN: CPT

## 2019-01-01 PROCEDURE — 77030029065 HC DRSG HEMO QCLOT ZMED -B: Performed by: INTERNAL MEDICINE

## 2019-01-01 PROCEDURE — 82550 ASSAY OF CK (CPK): CPT

## 2019-01-01 PROCEDURE — 87205 SMEAR GRAM STAIN: CPT

## 2019-01-01 PROCEDURE — 77030013386: Performed by: THORACIC SURGERY (CARDIOTHORACIC VASCULAR SURGERY)

## 2019-01-01 PROCEDURE — 88313 SPECIAL STAINS GROUP 2: CPT

## 2019-01-01 PROCEDURE — 74011000258 HC RX REV CODE- 258: Performed by: NURSE ANESTHETIST, CERTIFIED REGISTERED

## 2019-01-01 PROCEDURE — 77030026906 HC PMP CARD IMPELLA 5 ABIM -L: Performed by: THORACIC SURGERY (CARDIOTHORACIC VASCULAR SURGERY)

## 2019-01-01 PROCEDURE — 0W993ZZ DRAINAGE OF RIGHT PLEURAL CAVITY, PERCUTANEOUS APPROACH: ICD-10-PCS | Performed by: RADIOLOGY

## 2019-01-01 PROCEDURE — 77030034689 HC VASC DIL KT W/NDL LIVA -C: Performed by: THORACIC SURGERY (CARDIOTHORACIC VASCULAR SURGERY)

## 2019-01-01 PROCEDURE — 5A0221D ASSISTANCE WITH CARDIAC OUTPUT USING IMPELLER PUMP, CONTINUOUS: ICD-10-PCS | Performed by: THORACIC SURGERY (CARDIOTHORACIC VASCULAR SURGERY)

## 2019-01-01 PROCEDURE — 77030018836 HC SOL IRR NACL ICUM -A: Performed by: THORACIC SURGERY (CARDIOTHORACIC VASCULAR SURGERY)

## 2019-01-01 PROCEDURE — C1751 CATH, INF, PER/CENT/MIDLINE: HCPCS | Performed by: ANESTHESIOLOGY

## 2019-01-01 PROCEDURE — 74011636637 HC RX REV CODE- 636/637: Performed by: THORACIC SURGERY (CARDIOTHORACIC VASCULAR SURGERY)

## 2019-01-01 PROCEDURE — 77030013798 HC KT TRNSDUC PRSSR EDWD -B

## 2019-01-01 PROCEDURE — 97110 THERAPEUTIC EXERCISES: CPT

## 2019-01-01 PROCEDURE — 77030010938 HC CLP LIG TELE -A: Performed by: THORACIC SURGERY (CARDIOTHORACIC VASCULAR SURGERY)

## 2019-01-01 PROCEDURE — 77030011255 HC DSG AQUACEL AG BMS -A: Performed by: THORACIC SURGERY (CARDIOTHORACIC VASCULAR SURGERY)

## 2019-01-01 PROCEDURE — 51798 US URINE CAPACITY MEASURE: CPT

## 2019-01-01 PROCEDURE — 94760 N-INVAS EAR/PLS OXIMETRY 1: CPT

## 2019-01-01 PROCEDURE — 77030011235 HC DRN PERCRD SUMP MEDT -B: Performed by: THORACIC SURGERY (CARDIOTHORACIC VASCULAR SURGERY)

## 2019-01-01 PROCEDURE — 74011250637 HC RX REV CODE- 250/637: Performed by: PSYCHIATRY & NEUROLOGY

## 2019-01-01 PROCEDURE — 94010 BREATHING CAPACITY TEST: CPT

## 2019-01-01 PROCEDURE — 77030038606 HC IMPL VAD ASST HRTMT III W/CNTRL STJU -L: Performed by: THORACIC SURGERY (CARDIOTHORACIC VASCULAR SURGERY)

## 2019-01-01 PROCEDURE — 87086 URINE CULTURE/COLONY COUNT: CPT

## 2019-01-01 PROCEDURE — 92526 ORAL FUNCTION THERAPY: CPT

## 2019-01-01 PROCEDURE — 74018 RADEX ABDOMEN 1 VIEW: CPT

## 2019-01-01 PROCEDURE — 95816 EEG AWAKE AND DROWSY: CPT | Performed by: PSYCHIATRY & NEUROLOGY

## 2019-01-01 PROCEDURE — 77030041076 HC DRSG AG OPTICELL MDII -A

## 2019-01-01 PROCEDURE — 92526 ORAL FUNCTION THERAPY: CPT | Performed by: SPEECH-LANGUAGE PATHOLOGIST

## 2019-01-01 PROCEDURE — 77030008683 HC TU ET CUF COVD -A

## 2019-01-01 PROCEDURE — 77030029065 HC DRSG HEMO QCLOT ZMED -B

## 2019-01-01 PROCEDURE — P9045 ALBUMIN (HUMAN), 5%, 250 ML: HCPCS | Performed by: THORACIC SURGERY (CARDIOTHORACIC VASCULAR SURGERY)

## 2019-01-01 PROCEDURE — 77030012390 HC DRN CHST BTL GTNG -B: Performed by: THORACIC SURGERY (CARDIOTHORACIC VASCULAR SURGERY)

## 2019-01-01 PROCEDURE — 77030031139 HC SUT VCRL2 J&J -A

## 2019-01-01 PROCEDURE — 77030012390 HC DRN CHST BTL GTNG -B

## 2019-01-01 PROCEDURE — 76937 US GUIDE VASCULAR ACCESS: CPT

## 2019-01-01 PROCEDURE — C2624 WIRELESS PRESSURE SENSOR: HCPCS | Performed by: INTERNAL MEDICINE

## 2019-01-01 PROCEDURE — 85613 RUSSELL VIPER VENOM DILUTED: CPT

## 2019-01-01 PROCEDURE — C1894 INTRO/SHEATH, NON-LASER: HCPCS | Performed by: INTERNAL MEDICINE

## 2019-01-01 PROCEDURE — 85240 CLOT FACTOR VIII AHG 1 STAGE: CPT

## 2019-01-01 PROCEDURE — 77030034696 HC CATH URETH FOL 2W BARD -A: Performed by: UROLOGY

## 2019-01-01 PROCEDURE — 77030014491 HC PLEDG PTFE BARD -A: Performed by: THORACIC SURGERY (CARDIOTHORACIC VASCULAR SURGERY)

## 2019-01-01 PROCEDURE — 77030018846 HC SOL IRR STRL H20 ICUM -A

## 2019-01-01 PROCEDURE — 74011250636 HC RX REV CODE- 250/636: Performed by: PSYCHIATRY & NEUROLOGY

## 2019-01-01 PROCEDURE — 77030002987 HC SUT PROL J&J -B

## 2019-01-01 PROCEDURE — C1768 GRAFT, VASCULAR: HCPCS | Performed by: THORACIC SURGERY (CARDIOTHORACIC VASCULAR SURGERY)

## 2019-01-01 PROCEDURE — 77030011264 HC ELECTRD BLD EXT COVD -A: Performed by: THORACIC SURGERY (CARDIOTHORACIC VASCULAR SURGERY)

## 2019-01-01 PROCEDURE — 94664 DEMO&/EVAL PT USE INHALER: CPT

## 2019-01-01 PROCEDURE — 77030037877 HC DRSG MEPILEX >48IN BORD MOLN -A: Performed by: THORACIC SURGERY (CARDIOTHORACIC VASCULAR SURGERY)

## 2019-01-01 PROCEDURE — 77030018729 HC ELECTRD DEFIB PAD CARD -B: Performed by: THORACIC SURGERY (CARDIOTHORACIC VASCULAR SURGERY)

## 2019-01-01 PROCEDURE — 77030010547 HC BG URIN W/UMETER -A

## 2019-01-01 PROCEDURE — 85025 COMPLETE CBC W/AUTO DIFF WBC: CPT

## 2019-01-01 PROCEDURE — 36573 INSJ PICC RS&I 5 YR+: CPT | Performed by: NURSE PRACTITIONER

## 2019-01-01 PROCEDURE — 87186 SC STD MICRODIL/AGAR DIL: CPT

## 2019-01-01 PROCEDURE — P9047 ALBUMIN (HUMAN), 25%, 50ML: HCPCS | Performed by: INTERNAL MEDICINE

## 2019-01-01 PROCEDURE — 77030040831 HC BAG URINE DRNG MDII -A

## 2019-01-01 PROCEDURE — C9132 KCENTRA, PER I.U.: HCPCS | Performed by: THORACIC SURGERY (CARDIOTHORACIC VASCULAR SURGERY)

## 2019-01-01 PROCEDURE — 77030011640 HC PAD GRND REM COVD -A: Performed by: UROLOGY

## 2019-01-01 PROCEDURE — 77030018798 HC PMP KT ENTRL FED COVD -A

## 2019-01-01 PROCEDURE — 0TJB8ZZ INSPECTION OF BLADDER, VIA NATURAL OR ARTIFICIAL OPENING ENDOSCOPIC: ICD-10-PCS | Performed by: UROLOGY

## 2019-01-01 PROCEDURE — 70450 CT HEAD/BRAIN W/O DYE: CPT

## 2019-01-01 PROCEDURE — 71250 CT THORAX DX C-: CPT

## 2019-01-01 PROCEDURE — 77030013797 HC KT TRNSDUC PRSSR EDWD -A

## 2019-01-01 PROCEDURE — 74011250636 HC RX REV CODE- 250/636

## 2019-01-01 PROCEDURE — 77030020365 HC SOL INJ SOD CL 0.9% 50ML

## 2019-01-01 PROCEDURE — 77030013798 HC KT TRNSDUC PRSSR EDWD -B: Performed by: THORACIC SURGERY (CARDIOTHORACIC VASCULAR SURGERY)

## 2019-01-01 PROCEDURE — 76060000033 HC ANESTHESIA 1 TO 1.5 HR

## 2019-01-01 PROCEDURE — 99233 SBSQ HOSP IP/OBS HIGH 50: CPT | Performed by: NURSE PRACTITIONER

## 2019-01-01 PROCEDURE — 77030025869: Performed by: THORACIC SURGERY (CARDIOTHORACIC VASCULAR SURGERY)

## 2019-01-01 PROCEDURE — 82955 ASSAY OF G6PD ENZYME: CPT

## 2019-01-01 PROCEDURE — 33289 TCAT IMPL WRLS P-ART PRS SNR: CPT | Performed by: INTERNAL MEDICINE

## 2019-01-01 PROCEDURE — 77030019579 HC CBL PACE DISP REMG -B: Performed by: THORACIC SURGERY (CARDIOTHORACIC VASCULAR SURGERY)

## 2019-01-01 PROCEDURE — 77030003029 HC SUT VCRL J&J -B: Performed by: THORACIC SURGERY (CARDIOTHORACIC VASCULAR SURGERY)

## 2019-01-01 PROCEDURE — 93971 EXTREMITY STUDY: CPT

## 2019-01-01 PROCEDURE — 92610 EVALUATE SWALLOWING FUNCTION: CPT | Performed by: SPEECH-LANGUAGE PATHOLOGIST

## 2019-01-01 PROCEDURE — 86870 RBC ANTIBODY IDENTIFICATION: CPT

## 2019-01-01 PROCEDURE — 93880 EXTRACRANIAL BILAT STUDY: CPT

## 2019-01-01 PROCEDURE — C1729 CATH, DRAINAGE: HCPCS

## 2019-01-01 PROCEDURE — 85652 RBC SED RATE AUTOMATED: CPT

## 2019-01-01 PROCEDURE — 77030008771 HC TU NG SALEM SUMP -A: Performed by: ANESTHESIOLOGY

## 2019-01-01 PROCEDURE — 77030037878 HC DRSG MEPILEX >48IN BORD MOLN -B: Performed by: THORACIC SURGERY (CARDIOTHORACIC VASCULAR SURGERY)

## 2019-01-01 PROCEDURE — C1757 CATH, THROMBECTOMY/EMBOLECT: HCPCS

## 2019-01-01 PROCEDURE — 77030002986 HC SUT PROL J&J -A: Performed by: THORACIC SURGERY (CARDIOTHORACIC VASCULAR SURGERY)

## 2019-01-01 PROCEDURE — 77030013798 HC KT TRNSDUC PRSSR EDWD -B: Performed by: ANESTHESIOLOGY

## 2019-01-01 PROCEDURE — 77030019927 HC TBNG IRR CYSTO BAXT -A: Performed by: UROLOGY

## 2019-01-01 PROCEDURE — 77030020256 HC SOL INJ NACL 0.9%  500ML

## 2019-01-01 PROCEDURE — 87389 HIV-1 AG W/HIV-1&-2 AB AG IA: CPT

## 2019-01-01 PROCEDURE — 76010000129 HC CV SURG 1.5 TO 2 HR: Performed by: THORACIC SURGERY (CARDIOTHORACIC VASCULAR SURGERY)

## 2019-01-01 PROCEDURE — 73610000005 HC INO THERAPY INITIAL

## 2019-01-01 PROCEDURE — 77030020061 HC IV BLD WRMR ADMIN SET 3M -B: Performed by: ANESTHESIOLOGY

## 2019-01-01 PROCEDURE — 74011000250 HC RX REV CODE- 250: Performed by: NURSE ANESTHETIST, CERTIFIED REGISTERED

## 2019-01-01 PROCEDURE — C1887 CATHETER, GUIDING: HCPCS | Performed by: THORACIC SURGERY (CARDIOTHORACIC VASCULAR SURGERY)

## 2019-01-01 PROCEDURE — 77030011220 HC DEV ELECSURG COVD -B: Performed by: THORACIC SURGERY (CARDIOTHORACIC VASCULAR SURGERY)

## 2019-01-01 PROCEDURE — 76060000043 HC ANESTHESIA 6 TO 6.5 HR: Performed by: THORACIC SURGERY (CARDIOTHORACIC VASCULAR SURGERY)

## 2019-01-01 PROCEDURE — 74011636320 HC RX REV CODE- 636/320: Performed by: INTERNAL MEDICINE

## 2019-01-01 PROCEDURE — 77030005513 HC CATH URETH FOL11 MDII -B: Performed by: THORACIC SURGERY (CARDIOTHORACIC VASCULAR SURGERY)

## 2019-01-01 PROCEDURE — 77030026908

## 2019-01-01 PROCEDURE — 84132 ASSAY OF SERUM POTASSIUM: CPT

## 2019-01-01 PROCEDURE — 77030018846 HC SOL IRR STRL H20 ICUM -A: Performed by: UROLOGY

## 2019-01-01 PROCEDURE — 77030020263 HC SOL INJ SOD CL0.9% LFCR 1000ML: Performed by: THORACIC SURGERY (CARDIOTHORACIC VASCULAR SURGERY)

## 2019-01-01 PROCEDURE — 97165 OT EVAL LOW COMPLEX 30 MIN: CPT

## 2019-01-01 PROCEDURE — 82533 TOTAL CORTISOL: CPT

## 2019-01-01 PROCEDURE — 77030010506 HC ADH BIOGLU CRYO -G: Performed by: THORACIC SURGERY (CARDIOTHORACIC VASCULAR SURGERY)

## 2019-01-01 PROCEDURE — P9045 ALBUMIN (HUMAN), 5%, 250 ML: HCPCS

## 2019-01-01 PROCEDURE — 80307 DRUG TEST PRSMV CHEM ANLYZR: CPT

## 2019-01-01 PROCEDURE — C1769 GUIDE WIRE: HCPCS | Performed by: INTERNAL MEDICINE

## 2019-01-01 PROCEDURE — 5A1221Z PERFORMANCE OF CARDIAC OUTPUT, CONTINUOUS: ICD-10-PCS | Performed by: THORACIC SURGERY (CARDIOTHORACIC VASCULAR SURGERY)

## 2019-01-01 PROCEDURE — 94002 VENT MGMT INPAT INIT DAY: CPT

## 2019-01-01 PROCEDURE — 70490 CT SOFT TISSUE NECK W/O DYE: CPT

## 2019-01-01 PROCEDURE — 77030008027

## 2019-01-01 PROCEDURE — 30233R1 TRANSFUSION OF NONAUTOLOGOUS PLATELETS INTO PERIPHERAL VEIN, PERCUTANEOUS APPROACH: ICD-10-PCS | Performed by: INTERNAL MEDICINE

## 2019-01-01 PROCEDURE — 77030010389 HC WRE ATR PACE AEMC -B: Performed by: THORACIC SURGERY (CARDIOTHORACIC VASCULAR SURGERY)

## 2019-01-01 PROCEDURE — 77030018830 HC SOL IRR GLYC ICUM-A: Performed by: UROLOGY

## 2019-01-01 PROCEDURE — 77030013744: Performed by: INTERNAL MEDICINE

## 2019-01-01 PROCEDURE — 03HY32Z INSERTION OF MONITORING DEVICE INTO UPPER ARTERY, PERCUTANEOUS APPROACH: ICD-10-PCS | Performed by: ANESTHESIOLOGY

## 2019-01-01 PROCEDURE — 4A023N8 MEASUREMENT OF CARDIAC SAMPLING AND PRESSURE, BILATERAL, PERCUTANEOUS APPROACH: ICD-10-PCS | Performed by: INTERNAL MEDICINE

## 2019-01-01 PROCEDURE — 5A02216 ASSISTANCE WITH CARDIAC OUTPUT USING OTHER PUMP, CONTINUOUS: ICD-10-PCS | Performed by: THORACIC SURGERY (CARDIOTHORACIC VASCULAR SURGERY)

## 2019-01-01 PROCEDURE — 82306 VITAMIN D 25 HYDROXY: CPT

## 2019-01-01 PROCEDURE — 77030002973 HC SUT PLEDG CV SFT OVL TELE -B: Performed by: THORACIC SURGERY (CARDIOTHORACIC VASCULAR SURGERY)

## 2019-01-01 PROCEDURE — 84153 ASSAY OF PSA TOTAL: CPT

## 2019-01-01 PROCEDURE — 77030020332 HC BTRY VAD LI-ION STJU -F: Performed by: THORACIC SURGERY (CARDIOTHORACIC VASCULAR SURGERY)

## 2019-01-01 PROCEDURE — 97112 NEUROMUSCULAR REEDUCATION: CPT

## 2019-01-01 PROCEDURE — 93922 UPR/L XTREMITY ART 2 LEVELS: CPT

## 2019-01-01 PROCEDURE — 77030008462 HC STPLR SKN PROX J&J -A: Performed by: THORACIC SURGERY (CARDIOTHORACIC VASCULAR SURGERY)

## 2019-01-01 PROCEDURE — 77030026438 HC STYL ET INTUB CARD -A: Performed by: ANESTHESIOLOGY

## 2019-01-01 PROCEDURE — C1892 INTRO/SHEATH,FIXED,PEEL-AWAY: HCPCS | Performed by: INTERNAL MEDICINE

## 2019-01-01 PROCEDURE — 73610000026 HC INO THERAPY EACH HOUR

## 2019-01-01 PROCEDURE — 77030002933 HC SUT MCRYL J&J -A: Performed by: THORACIC SURGERY (CARDIOTHORACIC VASCULAR SURGERY)

## 2019-01-01 PROCEDURE — 77030040830 HC CATH URETH FOL MDII -A: Performed by: THORACIC SURGERY (CARDIOTHORACIC VASCULAR SURGERY)

## 2019-01-01 PROCEDURE — C1769 GUIDE WIRE: HCPCS

## 2019-01-01 PROCEDURE — 86301 IMMUNOASSAY TUMOR CA 19-9: CPT

## 2019-01-01 PROCEDURE — 77030005208 HC CATH HLDR GLWC -A

## 2019-01-01 PROCEDURE — 86022 PLATELET ANTIBODIES: CPT

## 2019-01-01 PROCEDURE — 77030019702 HC WRP THER MENM -C: Performed by: THORACIC SURGERY (CARDIOTHORACIC VASCULAR SURGERY)

## 2019-01-01 PROCEDURE — 86860 RBC ANTIBODY ELUTION: CPT

## 2019-01-01 PROCEDURE — 84550 ASSAY OF BLOOD/URIC ACID: CPT

## 2019-01-01 PROCEDURE — 77030013060 HC DEV INFL PRSS MRTM -B

## 2019-01-01 PROCEDURE — 77030013533 HC SEAL FBRN TISSL RDYTUSE 10ML BAXT -E: Performed by: THORACIC SURGERY (CARDIOTHORACIC VASCULAR SURGERY)

## 2019-01-01 PROCEDURE — 92610 EVALUATE SWALLOWING FUNCTION: CPT

## 2019-01-01 PROCEDURE — 88305 TISSUE EXAM BY PATHOLOGIST: CPT

## 2019-01-01 PROCEDURE — 77030027138 HC INCENT SPIROMETER -A

## 2019-01-01 PROCEDURE — 92611 MOTION FLUOROSCOPY/SWALLOW: CPT

## 2019-01-01 PROCEDURE — 02HV33Z INSERTION OF INFUSION DEVICE INTO SUPERIOR VENA CAVA, PERCUTANEOUS APPROACH: ICD-10-PCS | Performed by: INTERNAL MEDICINE

## 2019-01-01 PROCEDURE — C1751 CATH, INF, PER/CENT/MIDLINE: HCPCS | Performed by: INTERNAL MEDICINE

## 2019-01-01 PROCEDURE — 77030002986 HC SUT PROL J&J -A

## 2019-01-01 PROCEDURE — 82570 ASSAY OF URINE CREATININE: CPT

## 2019-01-01 PROCEDURE — 87070 CULTURE OTHR SPECIMN AEROBIC: CPT

## 2019-01-01 PROCEDURE — 77030014008 HC SPNG HEMSTAT J&J -C

## 2019-01-01 PROCEDURE — 02HV33Z INSERTION OF INFUSION DEVICE INTO SUPERIOR VENA CAVA, PERCUTANEOUS APPROACH: ICD-10-PCS | Performed by: ANESTHESIOLOGY

## 2019-01-01 PROCEDURE — 85049 AUTOMATED PLATELET COUNT: CPT

## 2019-01-01 PROCEDURE — 02HA3RZ INSERTION OF SHORT-TERM EXTERNAL HEART ASSIST SYSTEM INTO HEART, PERCUTANEOUS APPROACH: ICD-10-PCS | Performed by: THORACIC SURGERY (CARDIOTHORACIC VASCULAR SURGERY)

## 2019-01-01 PROCEDURE — 99153 MOD SED SAME PHYS/QHP EA: CPT | Performed by: INTERNAL MEDICINE

## 2019-01-01 PROCEDURE — 83051 HEMOGLOBIN PLASMA: CPT

## 2019-01-01 PROCEDURE — 77030011284 HC ELECTRD PD DEFIB 3M -A

## 2019-01-01 PROCEDURE — 77030020333 HC CLP F/BTRY VAD STJU -G1: Performed by: THORACIC SURGERY (CARDIOTHORACIC VASCULAR SURGERY)

## 2019-01-01 PROCEDURE — 77030004533 HC CATH ANGI DX IMP BSC -B: Performed by: INTERNAL MEDICINE

## 2019-01-01 PROCEDURE — 93930 UPPER EXTREMITY STUDY: CPT

## 2019-01-01 PROCEDURE — 82542 COL CHROMOTOGRAPHY QUAL/QUAN: CPT

## 2019-01-01 PROCEDURE — 84481 FREE ASSAY (FT-3): CPT

## 2019-01-01 PROCEDURE — 77030018835 HC SOL IRR LR ICUM -A: Performed by: THORACIC SURGERY (CARDIOTHORACIC VASCULAR SURGERY)

## 2019-01-01 PROCEDURE — 85306 CLOT INHIBIT PROT S FREE: CPT

## 2019-01-01 PROCEDURE — 77030013861 HC PNCH AORT CLNCUT QUES -B: Performed by: THORACIC SURGERY (CARDIOTHORACIC VASCULAR SURGERY)

## 2019-01-01 PROCEDURE — C1894 INTRO/SHEATH, NON-LASER: HCPCS

## 2019-01-01 PROCEDURE — 84439 ASSAY OF FREE THYROXINE: CPT

## 2019-01-01 PROCEDURE — 74011000250 HC RX REV CODE- 250: Performed by: PHYSICIAN ASSISTANT

## 2019-01-01 PROCEDURE — 74176 CT ABD & PELVIS W/O CONTRAST: CPT

## 2019-01-01 PROCEDURE — C8929 TTE W OR WO FOL WCON,DOPPLER: HCPCS

## 2019-01-01 PROCEDURE — C1725 CATH, TRANSLUMIN NON-LASER: HCPCS

## 2019-01-01 PROCEDURE — 0DJ08ZZ INSPECTION OF UPPER INTESTINAL TRACT, VIA NATURAL OR ARTIFICIAL OPENING ENDOSCOPIC: ICD-10-PCS | Performed by: INTERNAL MEDICINE

## 2019-01-01 PROCEDURE — 0DJD8ZZ INSPECTION OF LOWER INTESTINAL TRACT, VIA NATURAL OR ARTIFICIAL OPENING ENDOSCOPIC: ICD-10-PCS | Performed by: INTERNAL MEDICINE

## 2019-01-01 PROCEDURE — 77030011274 HC ELECTRD CUT LP RWOL -F: Performed by: UROLOGY

## 2019-01-01 PROCEDURE — B2111ZZ FLUOROSCOPY OF MULTIPLE CORONARY ARTERIES USING LOW OSMOLAR CONTRAST: ICD-10-PCS | Performed by: INTERNAL MEDICINE

## 2019-01-01 PROCEDURE — 76010000219 HC CV SURG 6 TO 6.5 HR INTENSV-TIER 1: Performed by: THORACIC SURGERY (CARDIOTHORACIC VASCULAR SURGERY)

## 2019-01-01 PROCEDURE — 77030008477 HC STYL SATN SLP COVD -A

## 2019-01-01 PROCEDURE — 94762 N-INVAS EAR/PLS OXIMTRY CONT: CPT

## 2019-01-01 PROCEDURE — 76060000032 HC ANESTHESIA 0.5 TO 1 HR: Performed by: INTERNAL MEDICINE

## 2019-01-01 PROCEDURE — 5A09357 ASSISTANCE WITH RESPIRATORY VENTILATION, LESS THAN 24 CONSECUTIVE HOURS, CONTINUOUS POSITIVE AIRWAY PRESSURE: ICD-10-PCS | Performed by: INTERNAL MEDICINE

## 2019-01-01 PROCEDURE — 77030018870 HC TY PARCNT BD -B

## 2019-01-01 PROCEDURE — 76060000034 HC ANESTHESIA 1.5 TO 2 HR: Performed by: THORACIC SURGERY (CARDIOTHORACIC VASCULAR SURGERY)

## 2019-01-01 PROCEDURE — 77030034537

## 2019-01-01 PROCEDURE — 86698 HISTOPLASMA ANTIBODY: CPT

## 2019-01-01 PROCEDURE — 80177 DRUG SCRN QUAN LEVETIRACETAM: CPT

## 2019-01-01 PROCEDURE — 77030011640 HC PAD GRND REM COVD -A: Performed by: THORACIC SURGERY (CARDIOTHORACIC VASCULAR SURGERY)

## 2019-01-01 PROCEDURE — 02HP32Z INSERTION OF MONITORING DEVICE INTO PULMONARY TRUNK, PERCUTANEOUS APPROACH: ICD-10-PCS | Performed by: ANESTHESIOLOGY

## 2019-01-01 PROCEDURE — P9047 ALBUMIN (HUMAN), 25%, 50ML: HCPCS

## 2019-01-01 PROCEDURE — 77030020256 HC SOL INJ NACL 0.9%  500ML: Performed by: THORACIC SURGERY (CARDIOTHORACIC VASCULAR SURGERY)

## 2019-01-01 PROCEDURE — 84443 ASSAY THYROID STIM HORMONE: CPT

## 2019-01-01 PROCEDURE — 77030027876 HC STPLR ENDOSC FLX PWR J&J -G1: Performed by: THORACIC SURGERY (CARDIOTHORACIC VASCULAR SURGERY)

## 2019-01-01 PROCEDURE — 77030002524 HC INSTR CLMP FGRTY EDWD -B: Performed by: THORACIC SURGERY (CARDIOTHORACIC VASCULAR SURGERY)

## 2019-01-01 PROCEDURE — 02HA0QZ INSERTION OF IMPLANTABLE HEART ASSIST SYSTEM INTO HEART, OPEN APPROACH: ICD-10-PCS | Performed by: THORACIC SURGERY (CARDIOTHORACIC VASCULAR SURGERY)

## 2019-01-01 PROCEDURE — 77030041076 HC DRSG AG OPTICELL MDII -A: Performed by: THORACIC SURGERY (CARDIOTHORACIC VASCULAR SURGERY)

## 2019-01-01 PROCEDURE — 77030005513 HC CATH URETH FOL11 MDII -B

## 2019-01-01 PROCEDURE — 74011000636 HC RX REV CODE- 636: Performed by: THORACIC SURGERY (CARDIOTHORACIC VASCULAR SURGERY)

## 2019-01-01 PROCEDURE — 32555 ASPIRATE PLEURA W/ IMAGING: CPT

## 2019-01-01 PROCEDURE — 77030012890

## 2019-01-01 PROCEDURE — 94060 EVALUATION OF WHEEZING: CPT

## 2019-01-01 PROCEDURE — C1757 CATH, THROMBECTOMY/EMBOLECT: HCPCS | Performed by: THORACIC SURGERY (CARDIOTHORACIC VASCULAR SURGERY)

## 2019-01-01 PROCEDURE — 77030002978 HC SUT POLY TELE -A: Performed by: THORACIC SURGERY (CARDIOTHORACIC VASCULAR SURGERY)

## 2019-01-01 PROCEDURE — 97168 OT RE-EVAL EST PLAN CARE: CPT

## 2019-01-01 PROCEDURE — 30283B1 TRANSFUSION OF NONAUTOLOGOUS 4-FACTOR PROTHROMBIN COMPLEX CONCENTRATE INTO VEIN, PERCUTANEOUS APPROACH: ICD-10-PCS | Performed by: INTERNAL MEDICINE

## 2019-01-01 PROCEDURE — 76010000138 HC OR TIME 0.5 TO 1 HR

## 2019-01-01 PROCEDURE — A9562 TC99M MERTIATIDE: HCPCS

## 2019-01-01 PROCEDURE — C1892 INTRO/SHEATH,FIXED,PEEL-AWAY: HCPCS | Performed by: THORACIC SURGERY (CARDIOTHORACIC VASCULAR SURGERY)

## 2019-01-01 PROCEDURE — 82270 OCCULT BLOOD FECES: CPT

## 2019-01-01 PROCEDURE — 32557 INSERT CATH PLEURA W/ IMAGE: CPT

## 2019-01-01 PROCEDURE — 99223 1ST HOSP IP/OBS HIGH 75: CPT | Performed by: INTERNAL MEDICINE

## 2019-01-01 PROCEDURE — 77030002916 HC SUT ETHLN J&J -A

## 2019-01-01 PROCEDURE — 77030005401 HC CATH RAD ARRO -A: Performed by: ANESTHESIOLOGY

## 2019-01-01 PROCEDURE — 77030010505 HC ADH BIOGLU CRYO -F: Performed by: THORACIC SURGERY (CARDIOTHORACIC VASCULAR SURGERY)

## 2019-01-01 PROCEDURE — 77030002924 HC SUT GORTX WLGO -B

## 2019-01-01 PROCEDURE — 80061 LIPID PANEL: CPT

## 2019-01-01 PROCEDURE — 77030008027: Performed by: THORACIC SURGERY (CARDIOTHORACIC VASCULAR SURGERY)

## 2019-01-01 PROCEDURE — 93931 UPPER EXTREMITY STUDY: CPT

## 2019-01-01 PROCEDURE — 95810 POLYSOM 6/> YRS 4/> PARAM: CPT | Performed by: INTERNAL MEDICINE

## 2019-01-01 PROCEDURE — 82140 ASSAY OF AMMONIA: CPT

## 2019-01-01 PROCEDURE — 77030008462 HC STPLR SKN PROX J&J -A

## 2019-01-01 PROCEDURE — 76040000007: Performed by: INTERNAL MEDICINE

## 2019-01-01 PROCEDURE — A9569 TECHNETIUM TC-99M AUTO WBC: HCPCS

## 2019-01-01 PROCEDURE — 77030011943

## 2019-01-01 PROCEDURE — 77030040361 HC SLV COMPR DVT MDII -B

## 2019-01-01 PROCEDURE — P9059 PLASMA, FRZ BETWEEN 8-24HOUR: HCPCS

## 2019-01-01 PROCEDURE — 95811 POLYSOM 6/>YRS CPAP 4/> PARM: CPT | Performed by: INTERNAL MEDICINE

## 2019-01-01 PROCEDURE — 03C50ZZ EXTIRPATION OF MATTER FROM RIGHT AXILLARY ARTERY, OPEN APPROACH: ICD-10-PCS | Performed by: THORACIC SURGERY (CARDIOTHORACIC VASCULAR SURGERY)

## 2019-01-01 PROCEDURE — 97164 PT RE-EVAL EST PLAN CARE: CPT

## 2019-01-01 PROCEDURE — 77030040361 HC SLV COMPR DVT MDII -B: Performed by: UROLOGY

## 2019-01-01 PROCEDURE — 85347 COAGULATION TIME ACTIVATED: CPT

## 2019-01-01 PROCEDURE — 97162 PT EVAL MOD COMPLEX 30 MIN: CPT

## 2019-01-01 PROCEDURE — 02PA4QZ REMOVAL OF IMPLANTABLE HEART ASSIST SYSTEM FROM HEART, PERCUTANEOUS ENDOSCOPIC APPROACH: ICD-10-PCS | Performed by: THORACIC SURGERY (CARDIOTHORACIC VASCULAR SURGERY)

## 2019-01-01 PROCEDURE — 93355 ECHO TRANSESOPHAGEAL (TEE): CPT | Performed by: THORACIC SURGERY (CARDIOTHORACIC VASCULAR SURGERY)

## 2019-01-01 PROCEDURE — 03U50JZ SUPPLEMENT RIGHT AXILLARY ARTERY WITH SYNTHETIC SUBSTITUTE, OPEN APPROACH: ICD-10-PCS | Performed by: THORACIC SURGERY (CARDIOTHORACIC VASCULAR SURGERY)

## 2019-01-01 PROCEDURE — 81240 F2 GENE: CPT

## 2019-01-01 PROCEDURE — 77030004532 HC CATH ANGI DX IMP BSC -A: Performed by: THORACIC SURGERY (CARDIOTHORACIC VASCULAR SURGERY)

## 2019-01-01 PROCEDURE — 77030003010 HC SUT SURG STL J&J -B: Performed by: THORACIC SURGERY (CARDIOTHORACIC VASCULAR SURGERY)

## 2019-01-01 PROCEDURE — 77030040504 HC DRN WND MDII -B: Performed by: THORACIC SURGERY (CARDIOTHORACIC VASCULAR SURGERY)

## 2019-01-01 PROCEDURE — 77030038079 HC RELD STPLR ENDOSC J&J -G: Performed by: THORACIC SURGERY (CARDIOTHORACIC VASCULAR SURGERY)

## 2019-01-01 PROCEDURE — 77030002996 HC SUT SLK J&J -A

## 2019-01-01 PROCEDURE — 77030010516 HC APPL HEMA CLP TELE -B: Performed by: THORACIC SURGERY (CARDIOTHORACIC VASCULAR SURGERY)

## 2019-01-01 DEVICE — PA SENSOR AND DELIVERY SYSTEM
Type: IMPLANTABLE DEVICE | Status: FUNCTIONAL
Brand: CARDIOMEMS™

## 2019-01-01 DEVICE — DEVICE IMPL VENTRCLR ASST KIT -- HEARTMATE III: Type: IMPLANTABLE DEVICE | Site: HEART | Status: FUNCTIONAL

## 2019-01-01 DEVICE — FELT SURG W6XL6IN THK1.65MM PTFE FOR THE REP OF SEPT DEFCT: Type: IMPLANTABLE DEVICE | Site: HEART | Status: FUNCTIONAL

## 2019-01-01 DEVICE — STRETCH VASCULAR GRAFT SW 20MMX20CM
Type: IMPLANTABLE DEVICE | Site: HEART | Status: FUNCTIONAL
Brand: GORE-TEX   STRETCH VASCULAR GRAFT

## 2019-01-01 DEVICE — Z DUP USE 2466785 GRAFT VASC L30CM DIA10MM THOR STR DBL VEL PASS SEW TB: Type: IMPLANTABLE DEVICE | Site: HEART | Status: FUNCTIONAL

## 2019-01-01 RX ORDER — BUMETANIDE 0.25 MG/ML
2 INJECTION INTRAMUSCULAR; INTRAVENOUS ONCE
Status: COMPLETED | OUTPATIENT
Start: 2019-01-01 | End: 2019-01-01

## 2019-01-01 RX ORDER — WARFARIN 2 MG/1
2 TABLET ORAL ONCE
Status: COMPLETED | OUTPATIENT
Start: 2019-01-01 | End: 2019-01-01

## 2019-01-01 RX ORDER — MUPIROCIN 20 MG/G
OINTMENT TOPICAL 2 TIMES DAILY
Status: DISPENSED | OUTPATIENT
Start: 2019-01-01 | End: 2019-01-01

## 2019-01-01 RX ORDER — FACIAL-BODY WIPES
10 EACH TOPICAL ONCE
Status: COMPLETED | OUTPATIENT
Start: 2019-01-01 | End: 2019-01-01

## 2019-01-01 RX ORDER — VECURONIUM BROMIDE FOR INJECTION 1 MG/ML
INJECTION, POWDER, LYOPHILIZED, FOR SOLUTION INTRAVENOUS AS NEEDED
Status: DISCONTINUED | OUTPATIENT
Start: 2019-01-01 | End: 2019-01-01 | Stop reason: HOSPADM

## 2019-01-01 RX ORDER — SODIUM CHLORIDE 9 MG/ML
250 INJECTION, SOLUTION INTRAVENOUS AS NEEDED
Status: DISCONTINUED | OUTPATIENT
Start: 2019-01-01 | End: 2019-01-01

## 2019-01-01 RX ORDER — FENTANYL CITRATE 50 UG/ML
INJECTION, SOLUTION INTRAMUSCULAR; INTRAVENOUS AS NEEDED
Status: DISCONTINUED | OUTPATIENT
Start: 2019-01-01 | End: 2019-01-01 | Stop reason: HOSPADM

## 2019-01-01 RX ORDER — HEPARIN SODIUM 200 [USP'U]/100ML
INJECTION, SOLUTION INTRAVENOUS
Status: COMPLETED | OUTPATIENT
Start: 2019-01-01 | End: 2019-01-01

## 2019-01-01 RX ORDER — PROPOFOL 10 MG/ML
INJECTION, EMULSION INTRAVENOUS
Status: DISCONTINUED | OUTPATIENT
Start: 2019-01-01 | End: 2019-01-01 | Stop reason: HOSPADM

## 2019-01-01 RX ORDER — BUMETANIDE 1 MG/1
2 TABLET ORAL 2 TIMES DAILY
Status: DISCONTINUED | OUTPATIENT
Start: 2019-01-01 | End: 2019-01-01

## 2019-01-01 RX ORDER — POLYETHYLENE GLYCOL 3350 17 G/17G
17 POWDER, FOR SOLUTION ORAL DAILY
Status: DISCONTINUED | OUTPATIENT
Start: 2019-01-01 | End: 2020-01-01

## 2019-01-01 RX ORDER — BUMETANIDE 0.25 MG/ML
1 INJECTION INTRAMUSCULAR; INTRAVENOUS 2 TIMES DAILY
Status: DISCONTINUED | OUTPATIENT
Start: 2019-01-01 | End: 2019-01-01

## 2019-01-01 RX ORDER — SODIUM CHLORIDE 450 MG/100ML
10 INJECTION, SOLUTION INTRAVENOUS CONTINUOUS
Status: DISCONTINUED | OUTPATIENT
Start: 2019-01-01 | End: 2019-01-01

## 2019-01-01 RX ORDER — HEPARIN SODIUM 10000 [USP'U]/100ML
11-25 INJECTION, SOLUTION INTRAVENOUS
Status: DISCONTINUED | OUTPATIENT
Start: 2019-01-01 | End: 2019-01-01

## 2019-01-01 RX ORDER — FAMOTIDINE 20 MG/1
20 TABLET, FILM COATED ORAL EVERY 12 HOURS
Status: DISCONTINUED | OUTPATIENT
Start: 2019-01-01 | End: 2019-01-01

## 2019-01-01 RX ORDER — GUAIFENESIN 100 MG/5ML
81 LIQUID (ML) ORAL DAILY
Status: DISCONTINUED | OUTPATIENT
Start: 2019-01-01 | End: 2019-01-01

## 2019-01-01 RX ORDER — BUMETANIDE 0.25 MG/ML
1 INJECTION INTRAMUSCULAR; INTRAVENOUS
Status: DISCONTINUED | OUTPATIENT
Start: 2019-01-01 | End: 2019-01-01

## 2019-01-01 RX ORDER — OXYCODONE AND ACETAMINOPHEN 5; 325 MG/1; MG/1
1 TABLET ORAL AS NEEDED
Status: DISCONTINUED | OUTPATIENT
Start: 2019-01-01 | End: 2019-01-01

## 2019-01-01 RX ORDER — DRONABINOL 2.5 MG/1
2.5 CAPSULE ORAL DAILY
Status: DISCONTINUED | OUTPATIENT
Start: 2019-01-01 | End: 2020-01-01

## 2019-01-01 RX ORDER — ALLOPURINOL 100 MG/1
100 TABLET ORAL DAILY
Status: DISCONTINUED | OUTPATIENT
Start: 2019-01-01 | End: 2019-01-01

## 2019-01-01 RX ORDER — PHENAZOPYRIDINE HYDROCHLORIDE 100 MG/1
100 TABLET, FILM COATED ORAL
Status: DISCONTINUED | OUTPATIENT
Start: 2019-01-01 | End: 2019-01-01

## 2019-01-01 RX ORDER — POTASSIUM CHLORIDE 29.8 MG/ML
20 INJECTION INTRAVENOUS
Status: COMPLETED | OUTPATIENT
Start: 2019-01-01 | End: 2019-01-01

## 2019-01-01 RX ORDER — SILDENAFIL CITRATE 20 MG/1
20 TABLET ORAL 3 TIMES DAILY
Qty: 90 TAB | Refills: 2 | Status: SHIPPED | OUTPATIENT
Start: 2019-01-01 | End: 2020-01-01

## 2019-01-01 RX ORDER — FACIAL-BODY WIPES
10 EACH TOPICAL DAILY PRN
Status: DISCONTINUED | OUTPATIENT
Start: 2019-01-01 | End: 2019-01-01

## 2019-01-01 RX ORDER — ALBUMIN HUMAN 250 G/1000ML
12.5 SOLUTION INTRAVENOUS ONCE
Status: COMPLETED | OUTPATIENT
Start: 2019-01-01 | End: 2019-01-01

## 2019-01-01 RX ORDER — CEFAZOLIN SODIUM/WATER 2 G/20 ML
2 SYRINGE (ML) INTRAVENOUS EVERY 8 HOURS
Status: DISCONTINUED | OUTPATIENT
Start: 2019-01-01 | End: 2019-01-01

## 2019-01-01 RX ORDER — POTASSIUM CHLORIDE 7.45 MG/ML
10 INJECTION INTRAVENOUS ONCE
Status: COMPLETED | OUTPATIENT
Start: 2019-01-01 | End: 2019-01-01

## 2019-01-01 RX ORDER — LEVETIRACETAM 250 MG/1
250 TABLET ORAL 2 TIMES DAILY
Status: DISCONTINUED | OUTPATIENT
Start: 2019-01-01 | End: 2019-01-01

## 2019-01-01 RX ORDER — ASPIRIN 300 MG/1
81 SUPPOSITORY RECTAL DAILY
Status: DISCONTINUED | OUTPATIENT
Start: 2019-01-01 | End: 2019-01-01

## 2019-01-01 RX ORDER — DRONABINOL 2.5 MG/1
2.5 CAPSULE ORAL
Status: DISCONTINUED | OUTPATIENT
Start: 2019-01-01 | End: 2019-01-01

## 2019-01-01 RX ORDER — ALBUMIN HUMAN 50 G/1000ML
12.5 SOLUTION INTRAVENOUS ONCE
Status: COMPLETED | OUTPATIENT
Start: 2019-01-01 | End: 2019-01-01

## 2019-01-01 RX ORDER — ALBUMIN HUMAN 50 G/1000ML
12.5 SOLUTION INTRAVENOUS
Status: COMPLETED | OUTPATIENT
Start: 2019-01-01 | End: 2019-01-01

## 2019-01-01 RX ORDER — ALBUMIN HUMAN 50 G/1000ML
SOLUTION INTRAVENOUS
Status: DISPENSED
Start: 2019-01-01 | End: 2019-01-01

## 2019-01-01 RX ORDER — SODIUM CHLORIDE 9 MG/ML
INJECTION, SOLUTION INTRAVENOUS
Status: DISCONTINUED | OUTPATIENT
Start: 2019-01-01 | End: 2019-01-01 | Stop reason: HOSPADM

## 2019-01-01 RX ORDER — MILRINONE LACTATE 0.2 MG/ML
0.3 INJECTION, SOLUTION INTRAVENOUS CONTINUOUS
Status: DISCONTINUED | OUTPATIENT
Start: 2019-01-01 | End: 2019-01-01

## 2019-01-01 RX ORDER — SODIUM CHLORIDE 9 MG/ML
9 INJECTION, SOLUTION INTRAVENOUS CONTINUOUS
Status: DISCONTINUED | OUTPATIENT
Start: 2019-01-01 | End: 2019-01-01

## 2019-01-01 RX ORDER — MAGNESIUM SULFATE 1 G/100ML
1 INJECTION INTRAVENOUS ONCE
Status: COMPLETED | OUTPATIENT
Start: 2019-01-01 | End: 2019-01-01

## 2019-01-01 RX ORDER — ACETAMINOPHEN 10 MG/ML
1000 INJECTION, SOLUTION INTRAVENOUS ONCE
Status: COMPLETED | OUTPATIENT
Start: 2019-01-01 | End: 2019-01-01

## 2019-01-01 RX ORDER — ONDANSETRON 2 MG/ML
4 INJECTION INTRAMUSCULAR; INTRAVENOUS
Status: DISCONTINUED | OUTPATIENT
Start: 2019-01-01 | End: 2019-01-01

## 2019-01-01 RX ORDER — DRONABINOL 2.5 MG/1
2.5 CAPSULE ORAL DAILY
Status: DISCONTINUED | OUTPATIENT
Start: 2019-01-01 | End: 2019-01-01

## 2019-01-01 RX ORDER — INSULIN LISPRO 100 [IU]/ML
INJECTION, SOLUTION INTRAVENOUS; SUBCUTANEOUS
Status: DISCONTINUED | OUTPATIENT
Start: 2019-01-01 | End: 2019-01-01

## 2019-01-01 RX ORDER — MUPIROCIN 20 MG/G
1 OINTMENT TOPICAL 2 TIMES DAILY
Status: DISCONTINUED | OUTPATIENT
Start: 2019-01-01 | End: 2019-01-01 | Stop reason: HOSPADM

## 2019-01-01 RX ORDER — POTASSIUM CHLORIDE 29.8 MG/ML
20 INJECTION INTRAVENOUS
Status: DISCONTINUED | OUTPATIENT
Start: 2019-01-01 | End: 2019-01-01

## 2019-01-01 RX ORDER — HYDROCORTISONE SODIUM SUCCINATE 100 MG/2ML
INJECTION, POWDER, FOR SOLUTION INTRAMUSCULAR; INTRAVENOUS AS NEEDED
Status: DISCONTINUED | OUTPATIENT
Start: 2019-01-01 | End: 2019-01-01 | Stop reason: HOSPADM

## 2019-01-01 RX ORDER — LANOLIN ALCOHOL/MO/W.PET/CERES
400 CREAM (GRAM) TOPICAL DAILY
Status: DISCONTINUED | OUTPATIENT
Start: 2019-01-01 | End: 2019-01-01

## 2019-01-01 RX ORDER — LIDOCAINE HYDROCHLORIDE 10 MG/ML
INJECTION INFILTRATION; PERINEURAL AS NEEDED
Status: DISCONTINUED | OUTPATIENT
Start: 2019-01-01 | End: 2019-01-01 | Stop reason: HOSPADM

## 2019-01-01 RX ORDER — DRONABINOL 2.5 MG/1
2.5 CAPSULE ORAL 2 TIMES DAILY
Status: DISCONTINUED | OUTPATIENT
Start: 2019-01-01 | End: 2019-01-01

## 2019-01-01 RX ORDER — WARFARIN 1 MG/1
3 TABLET ORAL ONCE
Status: COMPLETED | OUTPATIENT
Start: 2019-01-01 | End: 2019-01-01

## 2019-01-01 RX ORDER — AMIODARONE HYDROCHLORIDE 200 MG/1
100 TABLET ORAL 2 TIMES DAILY
Status: DISCONTINUED | OUTPATIENT
Start: 2019-01-01 | End: 2019-01-01

## 2019-01-01 RX ORDER — BACITRACIN 500 UNIT/G
1 PACKET (EA) TOPICAL AS NEEDED
Status: DISCONTINUED | OUTPATIENT
Start: 2019-01-01 | End: 2019-01-01

## 2019-01-01 RX ORDER — WARFARIN SODIUM 5 MG/1
5 TABLET ORAL ONCE
Status: DISCONTINUED | OUTPATIENT
Start: 2019-01-01 | End: 2019-01-01

## 2019-01-01 RX ORDER — POTASSIUM CHLORIDE 750 MG/1
40 TABLET, FILM COATED, EXTENDED RELEASE ORAL ONCE
Status: COMPLETED | OUTPATIENT
Start: 2019-01-01 | End: 2019-01-01

## 2019-01-01 RX ORDER — WARFARIN 1 MG/1
2 TABLET ORAL ONCE
Status: COMPLETED | OUTPATIENT
Start: 2019-01-01 | End: 2019-01-01

## 2019-01-01 RX ORDER — BUMETANIDE 0.25 MG/ML
1 INJECTION INTRAMUSCULAR; INTRAVENOUS 3 TIMES DAILY
Status: DISCONTINUED | OUTPATIENT
Start: 2019-01-01 | End: 2019-01-01

## 2019-01-01 RX ORDER — ESCITALOPRAM OXALATE 10 MG/1
10 TABLET ORAL EVERY EVENING
Status: DISCONTINUED | OUTPATIENT
Start: 2019-01-01 | End: 2020-01-01

## 2019-01-01 RX ORDER — POTASSIUM CHLORIDE 29.8 MG/ML
20 INJECTION INTRAVENOUS 3 TIMES DAILY
Status: DISPENSED | OUTPATIENT
Start: 2019-01-01 | End: 2019-01-01

## 2019-01-01 RX ORDER — ALBUTEROL SULFATE 0.83 MG/ML
2.5 SOLUTION RESPIRATORY (INHALATION)
Status: DISCONTINUED | OUTPATIENT
Start: 2019-01-01 | End: 2019-01-01

## 2019-01-01 RX ORDER — NALOXONE HYDROCHLORIDE 0.4 MG/ML
0.4 INJECTION, SOLUTION INTRAMUSCULAR; INTRAVENOUS; SUBCUTANEOUS AS NEEDED
Status: DISCONTINUED | OUTPATIENT
Start: 2019-01-01 | End: 2019-01-01

## 2019-01-01 RX ORDER — POLYETHYLENE GLYCOL 3350 17 G/17G
17 POWDER, FOR SOLUTION ORAL DAILY
Status: DISCONTINUED | OUTPATIENT
Start: 2019-01-01 | End: 2019-01-01

## 2019-01-01 RX ORDER — SODIUM CHLORIDE, SODIUM LACTATE, POTASSIUM CHLORIDE, CALCIUM CHLORIDE 600; 310; 30; 20 MG/100ML; MG/100ML; MG/100ML; MG/100ML
INJECTION, SOLUTION INTRAVENOUS
Status: DISCONTINUED | OUTPATIENT
Start: 2019-01-01 | End: 2019-01-01 | Stop reason: HOSPADM

## 2019-01-01 RX ORDER — MILRINONE LACTATE 0.2 MG/ML
0.12 INJECTION, SOLUTION INTRAVENOUS CONTINUOUS
Status: DISCONTINUED | OUTPATIENT
Start: 2019-01-01 | End: 2020-01-01

## 2019-01-01 RX ORDER — CEFAZOLIN 1 G/1
1 INJECTION, POWDER, FOR SOLUTION INTRAVENOUS ONCE
Status: DISCONTINUED | OUTPATIENT
Start: 2019-01-01 | End: 2019-01-01 | Stop reason: HOSPADM

## 2019-01-01 RX ORDER — DIGOXIN 125 MCG
0.12 TABLET ORAL DAILY
Status: DISCONTINUED | OUTPATIENT
Start: 2019-01-01 | End: 2019-01-01

## 2019-01-01 RX ORDER — BUMETANIDE 0.25 MG/ML
2 INJECTION INTRAMUSCULAR; INTRAVENOUS EVERY 12 HOURS
Status: DISCONTINUED | OUTPATIENT
Start: 2019-01-01 | End: 2019-01-01

## 2019-01-01 RX ORDER — DOBUTAMINE HYDROCHLORIDE 200 MG/100ML
INJECTION INTRAVENOUS
Status: DISCONTINUED | OUTPATIENT
Start: 2019-01-01 | End: 2019-01-01 | Stop reason: HOSPADM

## 2019-01-01 RX ORDER — ALBUMIN HUMAN 50 G/1000ML
SOLUTION INTRAVENOUS
Status: COMPLETED
Start: 2019-01-01 | End: 2019-01-01

## 2019-01-01 RX ORDER — BUMETANIDE 0.25 MG/ML
1 INJECTION INTRAMUSCULAR; INTRAVENOUS DAILY
Status: DISCONTINUED | OUTPATIENT
Start: 2019-01-01 | End: 2019-01-01

## 2019-01-01 RX ORDER — FLUCONAZOLE 2 MG/ML
200 INJECTION, SOLUTION INTRAVENOUS ONCE
Status: COMPLETED | OUTPATIENT
Start: 2019-01-01 | End: 2019-01-01

## 2019-01-01 RX ORDER — SODIUM CHLORIDE 0.9 % (FLUSH) 0.9 %
5-40 SYRINGE (ML) INJECTION AS NEEDED
Status: DISCONTINUED | OUTPATIENT
Start: 2019-01-01 | End: 2019-01-01

## 2019-01-01 RX ORDER — SODIUM CHLORIDE 0.9 % (FLUSH) 0.9 %
5-40 SYRINGE (ML) INJECTION EVERY 8 HOURS
Status: DISCONTINUED | OUTPATIENT
Start: 2019-01-01 | End: 2019-01-01

## 2019-01-01 RX ORDER — FENTANYL CITRATE 50 UG/ML
25 INJECTION, SOLUTION INTRAMUSCULAR; INTRAVENOUS
Status: DISCONTINUED | OUTPATIENT
Start: 2019-01-01 | End: 2019-01-01

## 2019-01-01 RX ORDER — CARVEDILOL 6.25 MG/1
6.25 TABLET ORAL 2 TIMES DAILY WITH MEALS
Status: DISCONTINUED | OUTPATIENT
Start: 2019-01-01 | End: 2019-01-01

## 2019-01-01 RX ORDER — SILDENAFIL CITRATE 20 MG/1
40 TABLET ORAL 3 TIMES DAILY
Status: DISCONTINUED | OUTPATIENT
Start: 2019-01-01 | End: 2019-01-01

## 2019-01-01 RX ORDER — POTASSIUM CHLORIDE 750 MG/1
40 TABLET, FILM COATED, EXTENDED RELEASE ORAL DAILY
Status: DISCONTINUED | OUTPATIENT
Start: 2019-01-01 | End: 2019-01-01

## 2019-01-01 RX ORDER — BUMETANIDE 0.25 MG/ML
2 INJECTION INTRAMUSCULAR; INTRAVENOUS 3 TIMES DAILY
Status: DISCONTINUED | OUTPATIENT
Start: 2019-01-01 | End: 2019-01-01

## 2019-01-01 RX ORDER — POTASSIUM CHLORIDE 750 MG/1
40 TABLET, FILM COATED, EXTENDED RELEASE ORAL
Status: COMPLETED | OUTPATIENT
Start: 2019-01-01 | End: 2019-01-01

## 2019-01-01 RX ORDER — DIGOXIN 250 MCG
0.25 TABLET ORAL DAILY
Status: DISCONTINUED | OUTPATIENT
Start: 2019-01-01 | End: 2019-01-01

## 2019-01-01 RX ORDER — MORPHINE SULFATE 2 MG/ML
2 INJECTION, SOLUTION INTRAMUSCULAR; INTRAVENOUS
Status: DISCONTINUED | OUTPATIENT
Start: 2019-01-01 | End: 2019-01-01

## 2019-01-01 RX ORDER — ACETAMINOPHEN 325 MG/1
650 TABLET ORAL EVERY 4 HOURS
Status: DISPENSED | OUTPATIENT
Start: 2019-01-01 | End: 2019-01-01

## 2019-01-01 RX ORDER — ALBUMIN HUMAN 50 G/1000ML
12.5 SOLUTION INTRAVENOUS
Status: DISCONTINUED | OUTPATIENT
Start: 2019-01-01 | End: 2020-01-01

## 2019-01-01 RX ORDER — HYDROMORPHONE HYDROCHLORIDE 1 MG/ML
0.2 INJECTION, SOLUTION INTRAMUSCULAR; INTRAVENOUS; SUBCUTANEOUS
Status: DISCONTINUED | OUTPATIENT
Start: 2019-01-01 | End: 2019-01-01

## 2019-01-01 RX ORDER — OXYCODONE HYDROCHLORIDE 5 MG/1
5 TABLET ORAL
Status: DISCONTINUED | OUTPATIENT
Start: 2019-01-01 | End: 2019-01-01

## 2019-01-01 RX ORDER — OXYCODONE HYDROCHLORIDE 5 MG/1
10 TABLET ORAL
Status: DISCONTINUED | OUTPATIENT
Start: 2019-01-01 | End: 2019-01-01

## 2019-01-01 RX ORDER — TORSEMIDE 20 MG/1
60 TABLET ORAL 2 TIMES DAILY
Qty: 360 TAB | Refills: 3 | Status: SHIPPED | OUTPATIENT
Start: 2019-01-01 | End: 2019-01-01

## 2019-01-01 RX ORDER — WARFARIN 1 MG/1
4 TABLET ORAL ONCE
Status: COMPLETED | OUTPATIENT
Start: 2019-01-01 | End: 2019-01-01

## 2019-01-01 RX ORDER — POTASSIUM CHLORIDE 29.8 MG/ML
20 INJECTION INTRAVENOUS ONCE
Status: COMPLETED | OUTPATIENT
Start: 2019-01-01 | End: 2019-01-01

## 2019-01-01 RX ORDER — HEPARIN SODIUM 1000 [USP'U]/ML
10000 INJECTION, SOLUTION INTRAVENOUS; SUBCUTANEOUS
Status: DISPENSED | OUTPATIENT
Start: 2019-01-01 | End: 2019-01-01

## 2019-01-01 RX ORDER — DRONABINOL 2.5 MG/1
5 CAPSULE ORAL
Status: DISCONTINUED | OUTPATIENT
Start: 2019-01-01 | End: 2019-01-01

## 2019-01-01 RX ORDER — ETOMIDATE 2 MG/ML
INJECTION INTRAVENOUS
Status: COMPLETED
Start: 2019-01-01 | End: 2019-01-01

## 2019-01-01 RX ORDER — LIDOCAINE HYDROCHLORIDE 20 MG/ML
JELLY TOPICAL ONCE
Status: COMPLETED | OUTPATIENT
Start: 2019-01-01 | End: 2019-01-01

## 2019-01-01 RX ORDER — SODIUM CHLORIDE 9 MG/ML
25 INJECTION, SOLUTION INTRAVENOUS CONTINUOUS
Status: DISCONTINUED | OUTPATIENT
Start: 2019-01-01 | End: 2019-01-01 | Stop reason: HOSPADM

## 2019-01-01 RX ORDER — AMIODARONE HYDROCHLORIDE 200 MG/1
400 TABLET ORAL DAILY
Status: DISCONTINUED | OUTPATIENT
Start: 2019-01-01 | End: 2019-01-01

## 2019-01-01 RX ORDER — MIDAZOLAM HYDROCHLORIDE 1 MG/ML
1 INJECTION, SOLUTION INTRAMUSCULAR; INTRAVENOUS AS NEEDED
Status: DISCONTINUED | OUTPATIENT
Start: 2019-01-01 | End: 2019-01-01

## 2019-01-01 RX ORDER — DEXTROMETHORPHAN/PSEUDOEPHED 2.5-7.5/.8
1.2 DROPS ORAL
Status: DISCONTINUED | OUTPATIENT
Start: 2019-01-01 | End: 2019-01-01

## 2019-01-01 RX ORDER — ALBUMIN HUMAN 50 G/1000ML
12.5 SOLUTION INTRAVENOUS AS NEEDED
Status: COMPLETED | OUTPATIENT
Start: 2019-01-01 | End: 2019-01-01

## 2019-01-01 RX ORDER — ESCITALOPRAM OXALATE 5 MG/1
TABLET ORAL
Refills: 12 | COMMUNITY
Start: 2019-01-01 | End: 2020-01-01

## 2019-01-01 RX ORDER — LANOLIN ALCOHOL/MO/W.PET/CERES
400 CREAM (GRAM) TOPICAL 2 TIMES DAILY
Status: DISCONTINUED | OUTPATIENT
Start: 2019-01-01 | End: 2019-01-01

## 2019-01-01 RX ORDER — POTASSIUM CHLORIDE AND SODIUM CHLORIDE 450; 150 MG/100ML; MG/100ML
INJECTION, SOLUTION INTRAVENOUS CONTINUOUS
Status: DISCONTINUED | OUTPATIENT
Start: 2019-01-01 | End: 2019-01-01

## 2019-01-01 RX ORDER — LANOLIN ALCOHOL/MO/W.PET/CERES
400 CREAM (GRAM) TOPICAL 3 TIMES DAILY
Status: DISCONTINUED | OUTPATIENT
Start: 2019-01-01 | End: 2019-01-01

## 2019-01-01 RX ORDER — AMOXICILLIN 250 MG
1 CAPSULE ORAL 2 TIMES DAILY
Status: DISCONTINUED | OUTPATIENT
Start: 2019-01-01 | End: 2019-01-01

## 2019-01-01 RX ORDER — OXYCODONE HYDROCHLORIDE 5 MG/1
5 TABLET ORAL
Status: DISCONTINUED | OUTPATIENT
Start: 2019-01-01 | End: 2020-01-01 | Stop reason: HOSPADM

## 2019-01-01 RX ORDER — CHLORHEXIDINE GLUCONATE 1.2 MG/ML
10 RINSE ORAL 2 TIMES DAILY
Status: COMPLETED | OUTPATIENT
Start: 2019-01-01 | End: 2019-01-01

## 2019-01-01 RX ORDER — POTASSIUM CHLORIDE 29.8 MG/ML
20 INJECTION INTRAVENOUS
Status: ACTIVE | OUTPATIENT
Start: 2019-01-01 | End: 2019-01-01

## 2019-01-01 RX ORDER — POTASSIUM CHLORIDE 29.8 MG/ML
INJECTION INTRAVENOUS
Status: COMPLETED
Start: 2019-01-01 | End: 2019-01-01

## 2019-01-01 RX ORDER — LIDOCAINE HYDROCHLORIDE 20 MG/ML
JELLY TOPICAL AS NEEDED
Status: DISCONTINUED | OUTPATIENT
Start: 2019-01-01 | End: 2020-01-01 | Stop reason: HOSPADM

## 2019-01-01 RX ORDER — MIDAZOLAM HYDROCHLORIDE 1 MG/ML
2 INJECTION, SOLUTION INTRAMUSCULAR; INTRAVENOUS ONCE
Status: COMPLETED | OUTPATIENT
Start: 2019-01-01 | End: 2019-01-01

## 2019-01-01 RX ORDER — BUMETANIDE 0.25 MG/ML
INJECTION INTRAMUSCULAR; INTRAVENOUS
Status: COMPLETED
Start: 2019-01-01 | End: 2019-01-01

## 2019-01-01 RX ORDER — WARFARIN 2.5 MG/1
2.5 TABLET ORAL ONCE
Status: COMPLETED | OUTPATIENT
Start: 2019-01-01 | End: 2019-01-01

## 2019-01-01 RX ORDER — WARFARIN 1 MG/1
1 TABLET ORAL ONCE
Status: COMPLETED | OUTPATIENT
Start: 2019-01-01 | End: 2019-01-01

## 2019-01-01 RX ORDER — FLUTICASONE PROPIONATE 50 MCG
2 SPRAY, SUSPENSION (ML) NASAL DAILY
Status: DISCONTINUED | OUTPATIENT
Start: 2019-01-01 | End: 2019-01-01

## 2019-01-01 RX ORDER — DIPHENHYDRAMINE HYDROCHLORIDE 50 MG/ML
12.5 INJECTION, SOLUTION INTRAMUSCULAR; INTRAVENOUS AS NEEDED
Status: DISCONTINUED | OUTPATIENT
Start: 2019-01-01 | End: 2019-01-01

## 2019-01-01 RX ORDER — AMIODARONE HYDROCHLORIDE 150 MG/3ML
300 INJECTION, SOLUTION INTRAVENOUS ONCE
Status: ACTIVE | OUTPATIENT
Start: 2019-01-01 | End: 2019-01-01

## 2019-01-01 RX ORDER — WARFARIN SODIUM 5 MG/1
5 TABLET ORAL ONCE
Status: COMPLETED | OUTPATIENT
Start: 2019-01-01 | End: 2019-01-01

## 2019-01-01 RX ORDER — WARFARIN 2 MG/1
4 TABLET ORAL ONCE
Status: COMPLETED | OUTPATIENT
Start: 2019-01-01 | End: 2019-01-01

## 2019-01-01 RX ORDER — POTASSIUM CHLORIDE 750 MG/1
60 TABLET, FILM COATED, EXTENDED RELEASE ORAL 3 TIMES DAILY
Status: DISCONTINUED | OUTPATIENT
Start: 2019-01-01 | End: 2019-01-01

## 2019-01-01 RX ORDER — SODIUM CHLORIDE 9 MG/ML
10 INJECTION, SOLUTION INTRAVENOUS CONTINUOUS
Status: DISCONTINUED | OUTPATIENT
Start: 2019-01-01 | End: 2019-01-01

## 2019-01-01 RX ORDER — POTASSIUM CHLORIDE 750 MG/1
40 TABLET, FILM COATED, EXTENDED RELEASE ORAL 3 TIMES DAILY
Status: DISCONTINUED | OUTPATIENT
Start: 2019-01-01 | End: 2019-01-01

## 2019-01-01 RX ORDER — IPRATROPIUM BROMIDE AND ALBUTEROL SULFATE 2.5; .5 MG/3ML; MG/3ML
3 SOLUTION RESPIRATORY (INHALATION)
Status: DISCONTINUED | OUTPATIENT
Start: 2019-01-01 | End: 2019-01-01

## 2019-01-01 RX ORDER — BALSAM PERU/CASTOR OIL
OINTMENT (GRAM) TOPICAL 3 TIMES DAILY
Status: DISCONTINUED | OUTPATIENT
Start: 2019-01-01 | End: 2019-01-01

## 2019-01-01 RX ORDER — SODIUM CHLORIDE 9 MG/ML
6 INJECTION, SOLUTION INTRAVENOUS CONTINUOUS
Status: DISCONTINUED | OUTPATIENT
Start: 2019-01-01 | End: 2019-01-01

## 2019-01-01 RX ORDER — SPIRONOLACTONE 25 MG/1
25 TABLET ORAL DAILY
Status: DISCONTINUED | OUTPATIENT
Start: 2019-01-01 | End: 2019-01-01

## 2019-01-01 RX ORDER — HEPARIN SODIUM 1000 [USP'U]/ML
INJECTION, SOLUTION INTRAVENOUS; SUBCUTANEOUS AS NEEDED
Status: DISCONTINUED | OUTPATIENT
Start: 2019-01-01 | End: 2019-01-01 | Stop reason: HOSPADM

## 2019-01-01 RX ORDER — DOBUTAMINE HYDROCHLORIDE 200 MG/100ML
2.5 INJECTION INTRAVENOUS CONTINUOUS
Status: DISCONTINUED | OUTPATIENT
Start: 2019-01-01 | End: 2019-01-01

## 2019-01-01 RX ORDER — SPIRONOLACTONE 25 MG/1
25 TABLET ORAL DAILY
Status: DISCONTINUED | OUTPATIENT
Start: 2019-01-01 | End: 2020-01-01

## 2019-01-01 RX ORDER — DIPHENHYDRAMINE HCL 25 MG
25 CAPSULE ORAL
Status: DISCONTINUED | OUTPATIENT
Start: 2019-01-01 | End: 2020-01-01 | Stop reason: HOSPADM

## 2019-01-01 RX ORDER — MIDAZOLAM HYDROCHLORIDE 1 MG/ML
INJECTION, SOLUTION INTRAMUSCULAR; INTRAVENOUS AS NEEDED
Status: DISCONTINUED | OUTPATIENT
Start: 2019-01-01 | End: 2019-01-01 | Stop reason: HOSPADM

## 2019-01-01 RX ORDER — MIDAZOLAM HYDROCHLORIDE 1 MG/ML
1 INJECTION, SOLUTION INTRAMUSCULAR; INTRAVENOUS
Status: COMPLETED | OUTPATIENT
Start: 2019-01-01 | End: 2019-01-01

## 2019-01-01 RX ORDER — ALBUMIN HUMAN 50 G/1000ML
25 SOLUTION INTRAVENOUS ONCE
Status: COMPLETED | OUTPATIENT
Start: 2019-01-01 | End: 2019-01-01

## 2019-01-01 RX ORDER — THERA TABS 400 MCG
1 TAB ORAL DAILY
Status: DISCONTINUED | OUTPATIENT
Start: 2019-01-01 | End: 2020-01-01 | Stop reason: HOSPADM

## 2019-01-01 RX ORDER — SUFENTANIL CITRATE 50 UG/ML
INJECTION EPIDURAL; INTRAVENOUS
Status: DISCONTINUED | OUTPATIENT
Start: 2019-01-01 | End: 2019-01-01 | Stop reason: HOSPADM

## 2019-01-01 RX ORDER — EPINEPHRINE 0.1 MG/ML
1 INJECTION INTRACARDIAC; INTRAVENOUS
Status: ACTIVE | OUTPATIENT
Start: 2019-01-01 | End: 2019-01-01

## 2019-01-01 RX ORDER — TAMSULOSIN HYDROCHLORIDE 0.4 MG/1
0.8 CAPSULE ORAL DAILY
Status: DISCONTINUED | OUTPATIENT
Start: 2019-01-01 | End: 2019-01-01

## 2019-01-01 RX ORDER — PHENYLEPHRINE HCL IN 0.9% NACL 0.4MG/10ML
SYRINGE (ML) INTRAVENOUS AS NEEDED
Status: DISCONTINUED | OUTPATIENT
Start: 2019-01-01 | End: 2019-01-01 | Stop reason: HOSPADM

## 2019-01-01 RX ORDER — PROPOFOL 10 MG/ML
0-50 VIAL (ML) INTRAVENOUS
Status: DISCONTINUED | OUTPATIENT
Start: 2019-01-01 | End: 2019-01-01

## 2019-01-01 RX ORDER — AMIODARONE HYDROCHLORIDE 200 MG/1
200 TABLET ORAL DAILY
Status: DISCONTINUED | OUTPATIENT
Start: 2019-01-01 | End: 2019-01-01

## 2019-01-01 RX ORDER — SODIUM CHLORIDE 0.9 % (FLUSH) 0.9 %
10 SYRINGE (ML) INJECTION
Status: COMPLETED | OUTPATIENT
Start: 2019-01-01 | End: 2019-01-01

## 2019-01-01 RX ORDER — BUMETANIDE 0.25 MG/ML
2 INJECTION INTRAMUSCULAR; INTRAVENOUS 2 TIMES DAILY
Status: DISCONTINUED | OUTPATIENT
Start: 2019-01-01 | End: 2019-01-01

## 2019-01-01 RX ORDER — PROPOFOL 10 MG/ML
INJECTION, EMULSION INTRAVENOUS AS NEEDED
Status: DISCONTINUED | OUTPATIENT
Start: 2019-01-01 | End: 2019-01-01 | Stop reason: HOSPADM

## 2019-01-01 RX ORDER — DOBUTAMINE HYDROCHLORIDE 200 MG/100ML
0-10 INJECTION INTRAVENOUS
Status: DISCONTINUED | OUTPATIENT
Start: 2019-01-01 | End: 2019-01-01

## 2019-01-01 RX ORDER — MIRTAZAPINE 15 MG/1
15 TABLET, FILM COATED ORAL
Status: DISCONTINUED | OUTPATIENT
Start: 2019-01-01 | End: 2019-01-01

## 2019-01-01 RX ORDER — HEPARIN SODIUM 5000 [USP'U]/ML
4000 INJECTION, SOLUTION INTRAVENOUS; SUBCUTANEOUS ONCE
Status: COMPLETED | OUTPATIENT
Start: 2019-01-01 | End: 2019-01-01

## 2019-01-01 RX ORDER — BACITRACIN 500 UNIT/G
PACKET (EA) TOPICAL
Status: COMPLETED
Start: 2019-01-01 | End: 2019-01-01

## 2019-01-01 RX ORDER — POTASSIUM CHLORIDE 750 MG/1
20 TABLET, FILM COATED, EXTENDED RELEASE ORAL 3 TIMES DAILY
Status: DISCONTINUED | OUTPATIENT
Start: 2019-01-01 | End: 2019-01-01

## 2019-01-01 RX ORDER — LIDOCAINE HYDROCHLORIDE 10 MG/ML
0.1 INJECTION, SOLUTION EPIDURAL; INFILTRATION; INTRACAUDAL; PERINEURAL AS NEEDED
Status: DISCONTINUED | OUTPATIENT
Start: 2019-01-01 | End: 2019-01-01

## 2019-01-01 RX ORDER — IPRATROPIUM BROMIDE AND ALBUTEROL SULFATE 2.5; .5 MG/3ML; MG/3ML
3 SOLUTION RESPIRATORY (INHALATION)
Status: DISCONTINUED | OUTPATIENT
Start: 2019-01-01 | End: 2020-01-01 | Stop reason: HOSPADM

## 2019-01-01 RX ORDER — PROTAMINE SULFATE 10 MG/ML
500 INJECTION, SOLUTION INTRAVENOUS ONCE
Status: DISCONTINUED | OUTPATIENT
Start: 2019-01-01 | End: 2019-01-01 | Stop reason: HOSPADM

## 2019-01-01 RX ORDER — POTASSIUM CHLORIDE 29.8 MG/ML
20 INJECTION INTRAVENOUS
Status: DISPENSED | OUTPATIENT
Start: 2019-01-01 | End: 2019-01-01

## 2019-01-01 RX ORDER — MAGNESIUM SULFATE HEPTAHYDRATE 40 MG/ML
2 INJECTION, SOLUTION INTRAVENOUS ONCE
Status: COMPLETED | OUTPATIENT
Start: 2019-01-01 | End: 2019-01-01

## 2019-01-01 RX ORDER — OXYMETAZOLINE HCL 0.05 %
2 SPRAY, NON-AEROSOL (ML) NASAL 2 TIMES DAILY
Status: DISCONTINUED | OUTPATIENT
Start: 2019-01-01 | End: 2019-01-01 | Stop reason: ALTCHOICE

## 2019-01-01 RX ORDER — BUMETANIDE 0.25 MG/ML
INJECTION INTRAMUSCULAR; INTRAVENOUS
Status: DISCONTINUED
Start: 2019-01-01 | End: 2019-01-01

## 2019-01-01 RX ORDER — DOBUTAMINE HYDROCHLORIDE 200 MG/100ML
2.5 INJECTION INTRAVENOUS
Status: DISCONTINUED | OUTPATIENT
Start: 2019-01-01 | End: 2019-01-01

## 2019-01-01 RX ORDER — OXYMETAZOLINE HCL 0.05 %
2 SPRAY, NON-AEROSOL (ML) NASAL
Status: DISPENSED | OUTPATIENT
Start: 2019-01-01 | End: 2019-01-01

## 2019-01-01 RX ORDER — ONDANSETRON 2 MG/ML
4 INJECTION INTRAMUSCULAR; INTRAVENOUS AS NEEDED
Status: DISCONTINUED | OUTPATIENT
Start: 2019-01-01 | End: 2019-01-01

## 2019-01-01 RX ORDER — MAGNESIUM SULFATE 100 %
4 CRYSTALS MISCELLANEOUS AS NEEDED
Status: DISCONTINUED | OUTPATIENT
Start: 2019-01-01 | End: 2019-01-01

## 2019-01-01 RX ORDER — FENTANYL CITRATE 50 UG/ML
50 INJECTION, SOLUTION INTRAMUSCULAR; INTRAVENOUS AS NEEDED
Status: DISCONTINUED | OUTPATIENT
Start: 2019-01-01 | End: 2019-01-01

## 2019-01-01 RX ORDER — DEXTROSE MONOHYDRATE 100 MG/ML
0-250 INJECTION, SOLUTION INTRAVENOUS AS NEEDED
Status: DISCONTINUED | OUTPATIENT
Start: 2019-01-01 | End: 2019-01-01

## 2019-01-01 RX ORDER — NALOXONE HYDROCHLORIDE 0.4 MG/ML
0.4 INJECTION, SOLUTION INTRAMUSCULAR; INTRAVENOUS; SUBCUTANEOUS AS NEEDED
Status: DISCONTINUED | OUTPATIENT
Start: 2019-01-01 | End: 2020-01-01 | Stop reason: HOSPADM

## 2019-01-01 RX ORDER — PANTOPRAZOLE SODIUM 40 MG/1
40 TABLET, DELAYED RELEASE ORAL
Status: DISCONTINUED | OUTPATIENT
Start: 2019-01-01 | End: 2019-01-01

## 2019-01-01 RX ORDER — ALBUMIN HUMAN 250 G/1000ML
25 SOLUTION INTRAVENOUS ONCE
Status: COMPLETED | OUTPATIENT
Start: 2019-01-01 | End: 2019-01-01

## 2019-01-01 RX ORDER — HEPARIN SODIUM 1000 [USP'U]/ML
2000 INJECTION, SOLUTION INTRAVENOUS; SUBCUTANEOUS ONCE
Status: COMPLETED | OUTPATIENT
Start: 2019-01-01 | End: 2019-01-01

## 2019-01-01 RX ORDER — POTASSIUM CHLORIDE 750 MG/1
40 TABLET, FILM COATED, EXTENDED RELEASE ORAL 2 TIMES DAILY
Status: DISCONTINUED | OUTPATIENT
Start: 2019-01-01 | End: 2019-01-01

## 2019-01-01 RX ORDER — ALBUMIN HUMAN 50 G/1000ML
12.5 SOLUTION INTRAVENOUS ONCE
Status: DISPENSED | OUTPATIENT
Start: 2019-01-01 | End: 2020-01-01

## 2019-01-01 RX ORDER — AMIODARONE HYDROCHLORIDE 200 MG/1
200 TABLET ORAL 2 TIMES DAILY
Status: DISCONTINUED | OUTPATIENT
Start: 2019-01-01 | End: 2019-01-01

## 2019-01-01 RX ORDER — INSULIN LISPRO 100 [IU]/ML
INJECTION, SOLUTION INTRAVENOUS; SUBCUTANEOUS EVERY 6 HOURS
Status: DISCONTINUED | OUTPATIENT
Start: 2019-01-01 | End: 2019-01-01

## 2019-01-01 RX ORDER — MILRINONE LACTATE 0.2 MG/ML
0.12 INJECTION, SOLUTION INTRAVENOUS CONTINUOUS
Status: DISCONTINUED | OUTPATIENT
Start: 2019-01-01 | End: 2019-01-01

## 2019-01-01 RX ORDER — FUROSEMIDE 10 MG/ML
20 INJECTION INTRAMUSCULAR; INTRAVENOUS
Status: COMPLETED | OUTPATIENT
Start: 2019-01-01 | End: 2019-01-01

## 2019-01-01 RX ORDER — MIDAZOLAM HYDROCHLORIDE 1 MG/ML
.25-5 INJECTION, SOLUTION INTRAMUSCULAR; INTRAVENOUS
Status: ACTIVE | OUTPATIENT
Start: 2019-01-01 | End: 2019-01-01

## 2019-01-01 RX ORDER — VASOPRESSIN 20 U/ML
INJECTION PARENTERAL AS NEEDED
Status: DISCONTINUED | OUTPATIENT
Start: 2019-01-01 | End: 2019-01-01 | Stop reason: HOSPADM

## 2019-01-01 RX ORDER — LANOLIN ALCOHOL/MO/W.PET/CERES
800 CREAM (GRAM) TOPICAL 2 TIMES DAILY
Status: DISCONTINUED | OUTPATIENT
Start: 2019-01-01 | End: 2020-01-01 | Stop reason: HOSPADM

## 2019-01-01 RX ORDER — SODIUM CHLORIDE 9 MG/ML
25 INJECTION, SOLUTION INTRAVENOUS CONTINUOUS
Status: DISCONTINUED | OUTPATIENT
Start: 2019-01-01 | End: 2019-01-01

## 2019-01-01 RX ORDER — DIPHENHYDRAMINE HYDROCHLORIDE 50 MG/ML
25 INJECTION, SOLUTION INTRAMUSCULAR; INTRAVENOUS
Status: DISCONTINUED | OUTPATIENT
Start: 2019-01-01 | End: 2019-01-01

## 2019-01-01 RX ORDER — PANTOPRAZOLE SODIUM 40 MG/1
40 TABLET, DELAYED RELEASE ORAL
Status: DISCONTINUED | OUTPATIENT
Start: 2019-01-01 | End: 2020-01-01 | Stop reason: HOSPADM

## 2019-01-01 RX ORDER — POTASSIUM CHLORIDE 750 MG/1
40 TABLET, FILM COATED, EXTENDED RELEASE ORAL DAILY
Status: DISCONTINUED | OUTPATIENT
Start: 2019-01-01 | End: 2020-01-01

## 2019-01-01 RX ORDER — ACETAMINOPHEN 325 MG/1
650 TABLET ORAL ONCE
Status: DISCONTINUED | OUTPATIENT
Start: 2019-01-01 | End: 2019-01-01

## 2019-01-01 RX ORDER — ARFORMOTEROL TARTRATE 15 UG/2ML
15 SOLUTION RESPIRATORY (INHALATION)
Status: DISCONTINUED | OUTPATIENT
Start: 2019-01-01 | End: 2020-01-01

## 2019-01-01 RX ORDER — INSULIN GLARGINE 100 [IU]/ML
1-50 INJECTION, SOLUTION SUBCUTANEOUS
Status: DISCONTINUED | OUTPATIENT
Start: 2019-01-01 | End: 2019-01-01

## 2019-01-01 RX ORDER — SODIUM CHLORIDE, SODIUM LACTATE, POTASSIUM CHLORIDE, CALCIUM CHLORIDE 600; 310; 30; 20 MG/100ML; MG/100ML; MG/100ML; MG/100ML
125 INJECTION, SOLUTION INTRAVENOUS CONTINUOUS
Status: DISCONTINUED | OUTPATIENT
Start: 2019-01-01 | End: 2019-01-01

## 2019-01-01 RX ORDER — OCTREOTIDE ACETATE 50 UG/ML
25 INJECTION, SOLUTION INTRAVENOUS; SUBCUTANEOUS 3 TIMES DAILY
Status: DISCONTINUED | OUTPATIENT
Start: 2019-01-01 | End: 2019-01-01

## 2019-01-01 RX ORDER — SODIUM CHLORIDE 9 MG/ML
250 INJECTION, SOLUTION INTRAVENOUS AS NEEDED
Status: DISCONTINUED | OUTPATIENT
Start: 2019-01-01 | End: 2019-01-01 | Stop reason: HOSPADM

## 2019-01-01 RX ORDER — LEVOFLOXACIN 5 MG/ML
500 INJECTION, SOLUTION INTRAVENOUS ONCE
Status: COMPLETED | OUTPATIENT
Start: 2019-01-01 | End: 2019-01-01

## 2019-01-01 RX ORDER — PHENAZOPYRIDINE HYDROCHLORIDE 100 MG/1
100 TABLET, FILM COATED ORAL 2 TIMES DAILY
Status: COMPLETED | OUTPATIENT
Start: 2019-01-01 | End: 2019-01-01

## 2019-01-01 RX ORDER — BUDESONIDE 0.5 MG/2ML
500 INHALANT ORAL
Status: DISCONTINUED | OUTPATIENT
Start: 2019-01-01 | End: 2020-01-01

## 2019-01-01 RX ORDER — MILRINONE LACTATE 0.2 MG/ML
0.3 INJECTION, SOLUTION INTRAVENOUS
Status: DISCONTINUED | OUTPATIENT
Start: 2019-01-01 | End: 2019-01-01

## 2019-01-01 RX ORDER — SILDENAFIL CITRATE 20 MG/1
10 TABLET ORAL 3 TIMES DAILY
Status: DISCONTINUED | OUTPATIENT
Start: 2019-01-01 | End: 2020-01-01

## 2019-01-01 RX ORDER — TORSEMIDE 20 MG/1
60 TABLET ORAL 2 TIMES DAILY
Qty: 360 TAB | Refills: 3
Start: 2019-01-01 | End: 2020-01-01

## 2019-01-01 RX ORDER — PROTAMINE SULFATE 10 MG/ML
INJECTION, SOLUTION INTRAVENOUS AS NEEDED
Status: DISCONTINUED | OUTPATIENT
Start: 2019-01-01 | End: 2019-01-01 | Stop reason: HOSPADM

## 2019-01-01 RX ORDER — SUCRALFATE 1 G/1
1 TABLET ORAL
Status: DISCONTINUED | OUTPATIENT
Start: 2019-01-01 | End: 2020-01-01 | Stop reason: HOSPADM

## 2019-01-01 RX ORDER — ALBUMIN HUMAN 250 G/1000ML
12.5 SOLUTION INTRAVENOUS 2 TIMES DAILY
Status: DISCONTINUED | OUTPATIENT
Start: 2019-01-01 | End: 2019-01-01

## 2019-01-01 RX ORDER — WARFARIN 2 MG/1
2 TABLET ORAL
Status: COMPLETED | OUTPATIENT
Start: 2019-01-01 | End: 2019-01-01

## 2019-01-01 RX ORDER — OXYMETAZOLINE HCL 0.05 %
2 SPRAY, NON-AEROSOL (ML) NASAL
Status: DISCONTINUED | OUTPATIENT
Start: 2019-01-01 | End: 2019-01-01

## 2019-01-01 RX ORDER — WARFARIN 4 MG/1
4 TABLET ORAL ONCE
Status: COMPLETED | OUTPATIENT
Start: 2019-01-01 | End: 2019-01-01

## 2019-01-01 RX ORDER — POTASSIUM CHLORIDE 750 MG/1
20 TABLET, FILM COATED, EXTENDED RELEASE ORAL 2 TIMES DAILY
Status: DISCONTINUED | OUTPATIENT
Start: 2019-01-01 | End: 2019-01-01

## 2019-01-01 RX ORDER — CHLORHEXIDINE GLUCONATE 1.2 MG/ML
10 RINSE ORAL EVERY 12 HOURS
Status: DISCONTINUED | OUTPATIENT
Start: 2019-01-01 | End: 2019-01-01

## 2019-01-01 RX ORDER — DIGOXIN 125 MCG
0.12 TABLET ORAL ONCE
Status: DISCONTINUED | OUTPATIENT
Start: 2019-01-01 | End: 2019-01-01

## 2019-01-01 RX ORDER — SODIUM CHLORIDE 0.9 % (FLUSH) 0.9 %
5-40 SYRINGE (ML) INJECTION EVERY 8 HOURS
Status: DISCONTINUED | OUTPATIENT
Start: 2019-01-01 | End: 2020-01-01 | Stop reason: HOSPADM

## 2019-01-01 RX ORDER — ALBUMIN HUMAN 50 G/1000ML
25 SOLUTION INTRAVENOUS ONCE
Status: DISCONTINUED | OUTPATIENT
Start: 2019-01-01 | End: 2019-01-01 | Stop reason: HOSPADM

## 2019-01-01 RX ORDER — DOBUTAMINE HYDROCHLORIDE 400 MG/100ML
0-10 INJECTION INTRAVENOUS
Status: DISCONTINUED | OUTPATIENT
Start: 2019-01-01 | End: 2019-01-01

## 2019-01-01 RX ORDER — ALBUMIN HUMAN 250 G/1000ML
SOLUTION INTRAVENOUS
Status: COMPLETED
Start: 2019-01-01 | End: 2019-01-01

## 2019-01-01 RX ORDER — INSULIN GLARGINE 100 [IU]/ML
1-50 INJECTION, SOLUTION SUBCUTANEOUS
Status: ACTIVE | OUTPATIENT
Start: 2019-01-01 | End: 2019-01-01

## 2019-01-01 RX ORDER — MORPHINE SULFATE 10 MG/ML
2 INJECTION, SOLUTION INTRAMUSCULAR; INTRAVENOUS
Status: DISCONTINUED | OUTPATIENT
Start: 2019-01-01 | End: 2019-01-01

## 2019-01-01 RX ORDER — HEPARIN SODIUM 10000 [USP'U]/100ML
11-25 INJECTION, SOLUTION INTRAVENOUS
Status: DISCONTINUED | OUTPATIENT
Start: 2019-01-01 | End: 2019-01-01 | Stop reason: SDUPTHER

## 2019-01-01 RX ORDER — VANCOMYCIN/0.9 % SOD CHLORIDE 1.5G/250ML
1500 PLASTIC BAG, INJECTION (ML) INTRAVENOUS ONCE
Status: COMPLETED | OUTPATIENT
Start: 2019-01-01 | End: 2019-01-01

## 2019-01-01 RX ORDER — SODIUM CHLORIDE 0.9 % (FLUSH) 0.9 %
5-40 SYRINGE (ML) INJECTION AS NEEDED
Status: DISCONTINUED | OUTPATIENT
Start: 2019-01-01 | End: 2020-01-01 | Stop reason: HOSPADM

## 2019-01-01 RX ORDER — POTASSIUM CHLORIDE 14.9 MG/ML
10 INJECTION INTRAVENOUS ONCE
Status: COMPLETED | OUTPATIENT
Start: 2019-01-01 | End: 2019-01-01

## 2019-01-01 RX ORDER — ATROPINE SULFATE 0.1 MG/ML
0.5 INJECTION INTRAVENOUS
Status: ACTIVE | OUTPATIENT
Start: 2019-01-01 | End: 2019-01-01

## 2019-01-01 RX ORDER — HYDRALAZINE HYDROCHLORIDE 20 MG/ML
20 INJECTION INTRAMUSCULAR; INTRAVENOUS
Status: DISCONTINUED | OUTPATIENT
Start: 2019-01-01 | End: 2020-01-01

## 2019-01-01 RX ORDER — HEPARIN SOD,PORCINE/0.9 % NACL 30K/1000ML
50-1000 INTRAVENOUS SOLUTION INTRAVENOUS AS NEEDED
Status: DISCONTINUED | OUTPATIENT
Start: 2019-01-01 | End: 2019-01-01 | Stop reason: HOSPADM

## 2019-01-01 RX ORDER — AMOXICILLIN 250 MG
1 CAPSULE ORAL DAILY
Status: DISCONTINUED | OUTPATIENT
Start: 2019-01-01 | End: 2019-01-01

## 2019-01-01 RX ORDER — OXYBUTYNIN CHLORIDE 5 MG/1
5 TABLET ORAL
Status: DISCONTINUED | OUTPATIENT
Start: 2019-01-01 | End: 2020-01-01 | Stop reason: HOSPADM

## 2019-01-01 RX ORDER — PHENYLEPHRINE 10 MG/250 ML(40 MCG/ML)IN 0.9 % SOD.CHLORIDE INTRAVENOUS
10-100
Status: DISCONTINUED | OUTPATIENT
Start: 2019-01-01 | End: 2019-01-01 | Stop reason: HOSPADM

## 2019-01-01 RX ORDER — SUFENTANIL CITRATE 50 UG/ML
INJECTION EPIDURAL; INTRAVENOUS AS NEEDED
Status: DISCONTINUED | OUTPATIENT
Start: 2019-01-01 | End: 2019-01-01 | Stop reason: HOSPADM

## 2019-01-01 RX ORDER — FACIAL-BODY WIPES
10 EACH TOPICAL DAILY
Status: DISCONTINUED | OUTPATIENT
Start: 2019-01-01 | End: 2019-01-01

## 2019-01-01 RX ORDER — FAMOTIDINE 20 MG/1
20 TABLET, FILM COATED ORAL EVERY 24 HOURS
Status: DISCONTINUED | OUTPATIENT
Start: 2019-01-01 | End: 2019-01-01

## 2019-01-01 RX ORDER — METOCLOPRAMIDE HYDROCHLORIDE 5 MG/ML
5 INJECTION INTRAMUSCULAR; INTRAVENOUS EVERY 6 HOURS
Status: DISPENSED | OUTPATIENT
Start: 2019-01-01 | End: 2019-01-01

## 2019-01-01 RX ORDER — BUMETANIDE 1 MG/1
1 TABLET ORAL DAILY
Status: DISCONTINUED | OUTPATIENT
Start: 2019-01-01 | End: 2019-01-01

## 2019-01-01 RX ORDER — ALBUMIN HUMAN 250 G/1000ML
25 SOLUTION INTRAVENOUS EVERY 4 HOURS
Status: COMPLETED | OUTPATIENT
Start: 2019-01-01 | End: 2019-01-01

## 2019-01-01 RX ORDER — ALLOPURINOL 100 MG/1
100 TABLET ORAL DAILY
Status: DISCONTINUED | OUTPATIENT
Start: 2019-01-01 | End: 2020-01-01

## 2019-01-01 RX ORDER — LIDOCAINE HYDROCHLORIDE 20 MG/ML
INJECTION, SOLUTION EPIDURAL; INFILTRATION; INTRACAUDAL; PERINEURAL AS NEEDED
Status: DISCONTINUED | OUTPATIENT
Start: 2019-01-01 | End: 2019-01-01 | Stop reason: HOSPADM

## 2019-01-01 RX ORDER — LIDOCAINE HYDROCHLORIDE 20 MG/ML
INJECTION, SOLUTION INFILTRATION; PERINEURAL AS NEEDED
Status: DISCONTINUED | OUTPATIENT
Start: 2019-01-01 | End: 2019-01-01 | Stop reason: HOSPADM

## 2019-01-01 RX ORDER — ALBUMIN HUMAN 50 G/1000ML
12.5 SOLUTION INTRAVENOUS DAILY
Status: DISCONTINUED | OUTPATIENT
Start: 2019-01-01 | End: 2019-01-01

## 2019-01-01 RX ORDER — LORAZEPAM 2 MG/ML
1 INJECTION INTRAMUSCULAR
Status: DISPENSED | OUTPATIENT
Start: 2019-01-01 | End: 2019-01-01

## 2019-01-01 RX ORDER — BUMETANIDE 0.25 MG/ML
1 INJECTION INTRAMUSCULAR; INTRAVENOUS ONCE
Status: COMPLETED | OUTPATIENT
Start: 2019-01-01 | End: 2019-01-01

## 2019-01-01 RX ORDER — ONDANSETRON 2 MG/ML
INJECTION INTRAMUSCULAR; INTRAVENOUS AS NEEDED
Status: DISCONTINUED | OUTPATIENT
Start: 2019-01-01 | End: 2019-01-01 | Stop reason: HOSPADM

## 2019-01-01 RX ORDER — MIDAZOLAM HYDROCHLORIDE 1 MG/ML
0.5 INJECTION, SOLUTION INTRAMUSCULAR; INTRAVENOUS
Status: DISCONTINUED | OUTPATIENT
Start: 2019-01-01 | End: 2019-01-01

## 2019-01-01 RX ORDER — MILRINONE LACTATE 0.2 MG/ML
0.2 INJECTION, SOLUTION INTRAVENOUS CONTINUOUS
Status: DISCONTINUED | OUTPATIENT
Start: 2019-01-01 | End: 2019-01-01

## 2019-01-01 RX ORDER — NALOXONE HYDROCHLORIDE 0.4 MG/ML
0.4 INJECTION, SOLUTION INTRAMUSCULAR; INTRAVENOUS; SUBCUTANEOUS
Status: ACTIVE | OUTPATIENT
Start: 2019-01-01 | End: 2019-01-01

## 2019-01-01 RX ORDER — GUAIFENESIN 100 MG/5ML
LIQUID (ML) ORAL AS NEEDED
Status: DISCONTINUED | OUTPATIENT
Start: 2019-01-01 | End: 2019-01-01 | Stop reason: HOSPADM

## 2019-01-01 RX ORDER — LEVOFLOXACIN 5 MG/ML
500 INJECTION, SOLUTION INTRAVENOUS EVERY 24 HOURS
Status: COMPLETED | OUTPATIENT
Start: 2019-01-01 | End: 2019-01-01

## 2019-01-01 RX ORDER — LANOLIN ALCOHOL/MO/W.PET/CERES
3 CREAM (GRAM) TOPICAL
Status: DISCONTINUED | OUTPATIENT
Start: 2019-01-01 | End: 2019-01-01

## 2019-01-01 RX ORDER — FENTANYL CITRATE 50 UG/ML
25-200 INJECTION, SOLUTION INTRAMUSCULAR; INTRAVENOUS
Status: ACTIVE | OUTPATIENT
Start: 2019-01-01 | End: 2019-01-01

## 2019-01-01 RX ORDER — ACETAMINOPHEN 325 MG/1
650 TABLET ORAL
Status: DISCONTINUED | OUTPATIENT
Start: 2019-01-01 | End: 2019-01-01 | Stop reason: HOSPADM

## 2019-01-01 RX ORDER — SILDENAFIL CITRATE 20 MG/1
20 TABLET ORAL 3 TIMES DAILY
Status: DISCONTINUED | OUTPATIENT
Start: 2019-01-01 | End: 2019-01-01

## 2019-01-01 RX ORDER — DESMOPRESSIN ACETATE 4 UG/ML
INJECTION, SOLUTION INTRAVENOUS; SUBCUTANEOUS AS NEEDED
Status: DISCONTINUED | OUTPATIENT
Start: 2019-01-01 | End: 2019-01-01 | Stop reason: HOSPADM

## 2019-01-01 RX ORDER — SODIUM CHLORIDE 0.9 % (FLUSH) 0.9 %
5-40 SYRINGE (ML) INJECTION EVERY 8 HOURS
Status: DISCONTINUED | OUTPATIENT
Start: 2019-01-01 | End: 2019-01-01 | Stop reason: HOSPADM

## 2019-01-01 RX ORDER — LEVETIRACETAM 250 MG/1
125 TABLET ORAL 2 TIMES DAILY
Status: DISCONTINUED | OUTPATIENT
Start: 2019-01-01 | End: 2020-01-01

## 2019-01-01 RX ORDER — BUMETANIDE 0.25 MG/ML
3 INJECTION INTRAMUSCULAR; INTRAVENOUS 3 TIMES DAILY
Status: DISCONTINUED | OUTPATIENT
Start: 2019-01-01 | End: 2019-01-01

## 2019-01-01 RX ORDER — ALBUMIN HUMAN 50 G/1000ML
12.5 SOLUTION INTRAVENOUS ONCE
Status: DISCONTINUED | OUTPATIENT
Start: 2019-01-01 | End: 2019-01-01

## 2019-01-01 RX ORDER — OCTREOTIDE ACETATE 50 UG/ML
25 INJECTION, SOLUTION INTRAVENOUS; SUBCUTANEOUS 3 TIMES DAILY
Status: DISCONTINUED | OUTPATIENT
Start: 2019-01-01 | End: 2020-01-01 | Stop reason: HOSPADM

## 2019-01-01 RX ORDER — SUCCINYLCHOLINE CHLORIDE 20 MG/ML
INJECTION INTRAMUSCULAR; INTRAVENOUS
Status: DISPENSED
Start: 2019-01-01 | End: 2019-01-01

## 2019-01-01 RX ORDER — AMIODARONE HYDROCHLORIDE 200 MG/1
100 TABLET ORAL DAILY
Status: DISCONTINUED | OUTPATIENT
Start: 2019-01-01 | End: 2019-01-01

## 2019-01-01 RX ORDER — BISACODYL 5 MG
5 TABLET, DELAYED RELEASE (ENTERIC COATED) ORAL DAILY PRN
Status: DISCONTINUED | OUTPATIENT
Start: 2019-01-01 | End: 2020-01-01

## 2019-01-01 RX ORDER — MUPIROCIN 20 MG/G
OINTMENT TOPICAL 2 TIMES DAILY
Status: COMPLETED | OUTPATIENT
Start: 2019-01-01 | End: 2019-01-01

## 2019-01-01 RX ORDER — SODIUM CHLORIDE 9 MG/ML
50 INJECTION, SOLUTION INTRAVENOUS CONTINUOUS
Status: DISPENSED | OUTPATIENT
Start: 2019-01-01 | End: 2019-01-01

## 2019-01-01 RX ORDER — MIDAZOLAM HYDROCHLORIDE 1 MG/ML
1 INJECTION, SOLUTION INTRAMUSCULAR; INTRAVENOUS
Status: DISCONTINUED | OUTPATIENT
Start: 2019-01-01 | End: 2019-01-01

## 2019-01-01 RX ORDER — MILRINONE LACTATE 0.2 MG/ML
0.12 INJECTION, SOLUTION INTRAVENOUS
Status: DISCONTINUED | OUTPATIENT
Start: 2019-01-01 | End: 2019-01-01

## 2019-01-01 RX ORDER — ALBUMIN HUMAN 50 G/1000ML
25 SOLUTION INTRAVENOUS ONCE
Status: DISCONTINUED | OUTPATIENT
Start: 2019-01-01 | End: 2019-01-01

## 2019-01-01 RX ORDER — TAMSULOSIN HYDROCHLORIDE 0.4 MG/1
0.4 CAPSULE ORAL DAILY
Status: DISCONTINUED | OUTPATIENT
Start: 2019-01-01 | End: 2019-01-01

## 2019-01-01 RX ORDER — AMOXICILLIN 250 MG
1 CAPSULE ORAL DAILY
Status: DISCONTINUED | OUTPATIENT
Start: 2019-01-01 | End: 2020-01-01

## 2019-01-01 RX ORDER — INSULIN LISPRO 100 [IU]/ML
INJECTION, SOLUTION INTRAVENOUS; SUBCUTANEOUS
Status: DISCONTINUED | OUTPATIENT
Start: 2019-01-01 | End: 2020-01-01

## 2019-01-01 RX ORDER — TEMAZEPAM 15 MG/1
15 CAPSULE ORAL
Status: DISCONTINUED | OUTPATIENT
Start: 2019-01-01 | End: 2019-01-01

## 2019-01-01 RX ORDER — ESCITALOPRAM OXALATE 10 MG/1
5 TABLET ORAL DAILY
Status: DISCONTINUED | OUTPATIENT
Start: 2019-01-01 | End: 2019-01-01

## 2019-01-01 RX ORDER — PHENYLEPHRINE HCL IN 0.9% NACL 0.4MG/10ML
SYRINGE (ML) INTRAVENOUS
Status: DISCONTINUED | OUTPATIENT
Start: 2019-01-01 | End: 2019-01-01 | Stop reason: HOSPADM

## 2019-01-01 RX ORDER — VANCOMYCIN HYDROCHLORIDE
1250
Status: COMPLETED | OUTPATIENT
Start: 2019-01-01 | End: 2019-01-01

## 2019-01-01 RX ORDER — ALBUMIN HUMAN 50 G/1000ML
12.5 SOLUTION INTRAVENOUS
Status: DISCONTINUED | OUTPATIENT
Start: 2019-01-01 | End: 2019-01-01

## 2019-01-01 RX ORDER — ONDANSETRON 2 MG/ML
4 INJECTION INTRAMUSCULAR; INTRAVENOUS
Status: DISCONTINUED | OUTPATIENT
Start: 2019-01-01 | End: 2020-01-01 | Stop reason: HOSPADM

## 2019-01-01 RX ORDER — SUCCINYLCHOLINE CHLORIDE 20 MG/ML
100 INJECTION INTRAMUSCULAR; INTRAVENOUS
Status: ACTIVE | OUTPATIENT
Start: 2019-01-01 | End: 2019-01-01

## 2019-01-01 RX ORDER — DIGOXIN 125 MCG
62.5 TABLET ORAL
Status: DISCONTINUED | OUTPATIENT
Start: 2019-01-01 | End: 2020-01-01

## 2019-01-01 RX ORDER — ASPIRIN 81 MG/1
81 TABLET ORAL DAILY
Status: DISCONTINUED | OUTPATIENT
Start: 2019-01-01 | End: 2019-01-01

## 2019-01-01 RX ORDER — ROCURONIUM BROMIDE 10 MG/ML
INJECTION, SOLUTION INTRAVENOUS AS NEEDED
Status: DISCONTINUED | OUTPATIENT
Start: 2019-01-01 | End: 2019-01-01 | Stop reason: HOSPADM

## 2019-01-01 RX ORDER — SODIUM CHLORIDE 9 MG/ML
5 INJECTION, SOLUTION INTRAVENOUS CONTINUOUS
Status: DISCONTINUED | OUTPATIENT
Start: 2019-01-01 | End: 2019-01-01

## 2019-01-01 RX ORDER — MILRINONE LACTATE 0.2 MG/ML
0.1 INJECTION, SOLUTION INTRAVENOUS CONTINUOUS
Status: DISCONTINUED | OUTPATIENT
Start: 2019-01-01 | End: 2019-01-01

## 2019-01-01 RX ORDER — SODIUM CHLORIDE 0.9 % (FLUSH) 0.9 %
5-40 SYRINGE (ML) INJECTION AS NEEDED
Status: DISCONTINUED | OUTPATIENT
Start: 2019-01-01 | End: 2019-01-01 | Stop reason: HOSPADM

## 2019-01-01 RX ORDER — TAMSULOSIN HYDROCHLORIDE 0.4 MG/1
0.4 CAPSULE ORAL DAILY
Status: DISCONTINUED | OUTPATIENT
Start: 2019-01-01 | End: 2020-01-01 | Stop reason: HOSPADM

## 2019-01-01 RX ORDER — ALBUMIN HUMAN 50 G/1000ML
25 SOLUTION INTRAVENOUS
Status: COMPLETED | OUTPATIENT
Start: 2019-01-01 | End: 2019-01-01

## 2019-01-01 RX ORDER — IPRATROPIUM BROMIDE 0.5 MG/2.5ML
0.5 SOLUTION RESPIRATORY (INHALATION)
Status: DISCONTINUED | OUTPATIENT
Start: 2019-01-01 | End: 2019-01-01

## 2019-01-01 RX ORDER — CEFAZOLIN SODIUM 1 G/3ML
INJECTION, POWDER, FOR SOLUTION INTRAMUSCULAR; INTRAVENOUS AS NEEDED
Status: DISCONTINUED | OUTPATIENT
Start: 2019-01-01 | End: 2019-01-01 | Stop reason: HOSPADM

## 2019-01-01 RX ORDER — POTASSIUM CHLORIDE 750 MG/1
20 TABLET, FILM COATED, EXTENDED RELEASE ORAL DAILY
Status: DISCONTINUED | OUTPATIENT
Start: 2019-01-01 | End: 2019-01-01

## 2019-01-01 RX ORDER — BUMETANIDE 0.25 MG/ML
1 INJECTION INTRAMUSCULAR; INTRAVENOUS 2 TIMES DAILY
Status: DISCONTINUED | OUTPATIENT
Start: 2019-01-01 | End: 2020-01-01

## 2019-01-01 RX ORDER — FLUCONAZOLE 2 MG/ML
200 INJECTION, SOLUTION INTRAVENOUS EVERY 24 HOURS
Status: COMPLETED | OUTPATIENT
Start: 2019-01-01 | End: 2019-01-01

## 2019-01-01 RX ORDER — NITROGLYCERIN 20 MG/100ML
0-200 INJECTION INTRAVENOUS
Status: DISCONTINUED | OUTPATIENT
Start: 2019-01-01 | End: 2019-01-01

## 2019-01-01 RX ORDER — MORPHINE SULFATE 4 MG/ML
4 INJECTION, SOLUTION INTRAMUSCULAR; INTRAVENOUS
Status: DISCONTINUED | OUTPATIENT
Start: 2019-01-01 | End: 2019-01-01

## 2019-01-01 RX ORDER — ETOMIDATE 2 MG/ML
INJECTION INTRAVENOUS AS NEEDED
Status: DISCONTINUED | OUTPATIENT
Start: 2019-01-01 | End: 2019-01-01 | Stop reason: HOSPADM

## 2019-01-01 RX ORDER — ACETAMINOPHEN 325 MG/1
650 TABLET ORAL
Status: DISCONTINUED | OUTPATIENT
Start: 2019-01-01 | End: 2020-01-01 | Stop reason: HOSPADM

## 2019-01-01 RX ORDER — SPIRONOLACTONE 25 MG/1
12.5 TABLET ORAL DAILY
Status: DISCONTINUED | OUTPATIENT
Start: 2019-01-01 | End: 2019-01-01

## 2019-01-01 RX ORDER — SODIUM CHLORIDE 9 MG/ML
3 INJECTION, SOLUTION INTRAVENOUS CONTINUOUS
Status: DISCONTINUED | OUTPATIENT
Start: 2019-01-01 | End: 2019-01-01

## 2019-01-01 RX ORDER — CLONAZEPAM 0.5 MG/1
0.5 TABLET ORAL
Status: DISCONTINUED | OUTPATIENT
Start: 2019-01-01 | End: 2020-01-01

## 2019-01-01 RX ORDER — AMIODARONE HYDROCHLORIDE 200 MG/1
TABLET ORAL
Qty: 180 TAB | Refills: 4 | Status: SHIPPED | OUTPATIENT
Start: 2019-01-01 | End: 2019-01-01 | Stop reason: ALTCHOICE

## 2019-01-01 RX ORDER — ACETAMINOPHEN 10 MG/ML
1000 INJECTION, SOLUTION INTRAVENOUS
Status: DISPENSED | OUTPATIENT
Start: 2019-01-01 | End: 2019-01-01

## 2019-01-01 RX ORDER — CEFAZOLIN SODIUM 1 G/3ML
1 INJECTION, POWDER, FOR SOLUTION INTRAMUSCULAR; INTRAVENOUS ONCE
Status: COMPLETED | OUTPATIENT
Start: 2019-01-01 | End: 2019-01-01

## 2019-01-01 RX ORDER — LIDOCAINE 50 MG/G
1 PATCH TOPICAL EVERY 24 HOURS
Status: DISCONTINUED | OUTPATIENT
Start: 2019-01-01 | End: 2019-01-01

## 2019-01-01 RX ORDER — DEXTROSE MONOHYDRATE 50 MG/ML
4-20 INJECTION, SOLUTION INTRAVENOUS CONTINUOUS
Status: DISCONTINUED | OUTPATIENT
Start: 2019-01-01 | End: 2019-01-01

## 2019-01-01 RX ORDER — MAGNESIUM SULFATE 1 G/100ML
1 INJECTION INTRAVENOUS AS NEEDED
Status: DISCONTINUED | OUTPATIENT
Start: 2019-01-01 | End: 2019-01-01

## 2019-01-01 RX ORDER — FINASTERIDE 5 MG/1
5 TABLET, FILM COATED ORAL DAILY
Status: DISCONTINUED | OUTPATIENT
Start: 2019-01-01 | End: 2020-01-01 | Stop reason: HOSPADM

## 2019-01-01 RX ORDER — IPRATROPIUM BROMIDE 0.5 MG/2.5ML
0.5 SOLUTION RESPIRATORY (INHALATION) EVERY 8 HOURS
Status: DISCONTINUED | OUTPATIENT
Start: 2019-01-01 | End: 2019-01-01

## 2019-01-01 RX ORDER — FLUMAZENIL 0.1 MG/ML
0.2 INJECTION INTRAVENOUS
Status: ACTIVE | OUTPATIENT
Start: 2019-01-01 | End: 2019-01-01

## 2019-01-01 RX ORDER — ALBUMIN HUMAN 250 G/1000ML
25 SOLUTION INTRAVENOUS EVERY 6 HOURS
Status: COMPLETED | OUTPATIENT
Start: 2019-01-01 | End: 2019-01-01

## 2019-01-01 RX ORDER — HYDRALAZINE HYDROCHLORIDE 10 MG/1
10 TABLET, FILM COATED ORAL 3 TIMES DAILY
Status: DISCONTINUED | OUTPATIENT
Start: 2019-01-01 | End: 2019-01-01

## 2019-01-01 RX ORDER — MILRINONE LACTATE 0.2 MG/ML
0-.75 INJECTION, SOLUTION INTRAVENOUS
Status: DISCONTINUED | OUTPATIENT
Start: 2019-01-01 | End: 2019-01-01

## 2019-01-01 RX ORDER — ETOMIDATE 2 MG/ML
8 INJECTION INTRAVENOUS
Status: COMPLETED | OUTPATIENT
Start: 2019-01-01 | End: 2019-01-01

## 2019-01-01 RX ADMIN — PIPERACILLIN AND TAZOBACTAM 3.38 G: 3; .375 INJECTION, POWDER, FOR SOLUTION INTRAVENOUS at 16:25

## 2019-01-01 RX ADMIN — BUDESONIDE 500 MCG: 0.5 INHALANT RESPIRATORY (INHALATION) at 19:49

## 2019-01-01 RX ADMIN — SUCRALFATE 1 G: 1 TABLET ORAL at 11:08

## 2019-01-01 RX ADMIN — SODIUM CHLORIDE 40 MG: 9 INJECTION INTRAMUSCULAR; INTRAVENOUS; SUBCUTANEOUS at 09:10

## 2019-01-01 RX ADMIN — MORPHINE SULFATE 4 MG: 4 INJECTION, SOLUTION INTRAMUSCULAR; INTRAVENOUS at 19:27

## 2019-01-01 RX ADMIN — SUCRALFATE 1 G: 1 TABLET ORAL at 16:06

## 2019-01-01 RX ADMIN — Medication 10 ML: at 06:22

## 2019-01-01 RX ADMIN — CHLORHEXIDINE GLUCONATE 10 ML: 1.2 RINSE ORAL at 21:26

## 2019-01-01 RX ADMIN — OCTREOTIDE ACETATE 25 MCG: 50 INJECTION, SOLUTION INTRAVENOUS; SUBCUTANEOUS at 18:25

## 2019-01-01 RX ADMIN — MUPIROCIN: 20 OINTMENT TOPICAL at 09:07

## 2019-01-01 RX ADMIN — SALINE NASAL SPRAY 2 SPRAY: 1.5 SOLUTION NASAL at 12:25

## 2019-01-01 RX ADMIN — MORPHINE SULFATE 4 MG: 4 INJECTION, SOLUTION INTRAMUSCULAR; INTRAVENOUS at 12:08

## 2019-01-01 RX ADMIN — Medication 10 ML: at 21:39

## 2019-01-01 RX ADMIN — SILDENAFIL 40 MG: 20 TABLET ORAL at 08:39

## 2019-01-01 RX ADMIN — Medication 10 ML: at 22:31

## 2019-01-01 RX ADMIN — MIRTAZAPINE 15 MG: 15 TABLET, FILM COATED ORAL at 21:47

## 2019-01-01 RX ADMIN — Medication 1 CAPSULE: at 09:24

## 2019-01-01 RX ADMIN — POTASSIUM CHLORIDE 20 MEQ: 750 TABLET, FILM COATED, EXTENDED RELEASE ORAL at 08:13

## 2019-01-01 RX ADMIN — CASTOR OIL AND BALSAM, PERU: 788; 87 OINTMENT TOPICAL at 17:17

## 2019-01-01 RX ADMIN — BUDESONIDE 500 MCG: 0.5 INHALANT RESPIRATORY (INHALATION) at 09:34

## 2019-01-01 RX ADMIN — Medication 10 ML: at 17:10

## 2019-01-01 RX ADMIN — EPOETIN ALFA-EPBX 20000 UNITS: 10000 INJECTION, SOLUTION INTRAVENOUS; SUBCUTANEOUS at 21:23

## 2019-01-01 RX ADMIN — SUCRALFATE 1 G: 1 TABLET ORAL at 13:33

## 2019-01-01 RX ADMIN — POLYETHYLENE GLYCOL 3350 17 G: 17 POWDER, FOR SOLUTION ORAL at 08:54

## 2019-01-01 RX ADMIN — BUMETANIDE 1 MG/HR: 0.25 INJECTION INTRAMUSCULAR; INTRAVENOUS at 16:41

## 2019-01-01 RX ADMIN — SUCRALFATE 1 G: 1 TABLET ORAL at 07:08

## 2019-01-01 RX ADMIN — LEVETIRACETAM 250 MG: 100 SOLUTION ORAL at 08:13

## 2019-01-01 RX ADMIN — BISACODYL 10 MG: 10 SUPPOSITORY RECTAL at 12:36

## 2019-01-01 RX ADMIN — Medication 10 ML: at 21:34

## 2019-01-01 RX ADMIN — DRONABINOL 5 MG: 2.5 CAPSULE ORAL at 17:01

## 2019-01-01 RX ADMIN — BUMETANIDE 2 MG: 0.25 INJECTION INTRAMUSCULAR; INTRAVENOUS at 21:40

## 2019-01-01 RX ADMIN — MAGNESIUM OXIDE TAB 400 MG (241.3 MG ELEMENTAL MG) 400 MG: 400 (241.3 MG) TAB at 17:05

## 2019-01-01 RX ADMIN — CHLORHEXIDINE GLUCONATE 10 ML: 1.2 RINSE ORAL at 08:28

## 2019-01-01 RX ADMIN — MAGNESIUM OXIDE TAB 400 MG (241.3 MG ELEMENTAL MG) 400 MG: 400 (241.3 MG) TAB at 08:56

## 2019-01-01 RX ADMIN — CHLORHEXIDINE GLUCONATE 10 ML: 1.2 RINSE ORAL at 08:05

## 2019-01-01 RX ADMIN — OXYCODONE HYDROCHLORIDE 5 MG: 5 TABLET ORAL at 07:07

## 2019-01-01 RX ADMIN — Medication 10 ML: at 14:29

## 2019-01-01 RX ADMIN — MIDAZOLAM 2 MG: 1 INJECTION INTRAMUSCULAR; INTRAVENOUS at 10:55

## 2019-01-01 RX ADMIN — BENZOCAINE AND MENTHOL 1 LOZENGE: 15; 3.6 LOZENGE ORAL at 19:12

## 2019-01-01 RX ADMIN — MAGNESIUM OXIDE TAB 400 MG (241.3 MG ELEMENTAL MG) 400 MG: 400 (241.3 MG) TAB at 08:40

## 2019-01-01 RX ADMIN — ALLOPURINOL 100 MG: 100 TABLET ORAL at 08:46

## 2019-01-01 RX ADMIN — ALBUTEROL SULFATE 2.5 MG: 2.5 SOLUTION RESPIRATORY (INHALATION) at 04:04

## 2019-01-01 RX ADMIN — MILRINONE LACTATE IN DEXTROSE 0.4 MCG/KG/MIN: 200 INJECTION, SOLUTION INTRAVENOUS at 21:17

## 2019-01-01 RX ADMIN — MUPIROCIN: 20 OINTMENT TOPICAL at 19:38

## 2019-01-01 RX ADMIN — LEVETIRACETAM 125 MG: 250 TABLET ORAL at 08:11

## 2019-01-01 RX ADMIN — DRONABINOL 2.5 MG: 2.5 CAPSULE ORAL at 18:32

## 2019-01-01 RX ADMIN — MUPIROCIN: 20 OINTMENT TOPICAL at 17:14

## 2019-01-01 RX ADMIN — SILDENAFIL CITRATE 20 MG: 20 TABLET, FILM COATED ORAL at 18:30

## 2019-01-01 RX ADMIN — MAGNESIUM OXIDE TAB 400 MG (241.3 MG ELEMENTAL MG) 400 MG: 400 (241.3 MG) TAB at 09:49

## 2019-01-01 RX ADMIN — SPIRONOLACTONE 25 MG: 25 TABLET ORAL at 08:50

## 2019-01-01 RX ADMIN — DEXMEDETOMIDINE HYDROCHLORIDE 4 MCG: 100 INJECTION, SOLUTION, CONCENTRATE INTRAVENOUS at 07:54

## 2019-01-01 RX ADMIN — CHLORHEXIDINE GLUCONATE 10 ML: 1.2 RINSE ORAL at 08:49

## 2019-01-01 RX ADMIN — PROPOFOL 40 MG: 10 INJECTION, EMULSION INTRAVENOUS at 13:19

## 2019-01-01 RX ADMIN — Medication 10 ML: at 13:12

## 2019-01-01 RX ADMIN — PANTOPRAZOLE SODIUM 40 MG: 40 TABLET, DELAYED RELEASE ORAL at 06:57

## 2019-01-01 RX ADMIN — DRONABINOL 2.5 MG: 2.5 CAPSULE ORAL at 17:41

## 2019-01-01 RX ADMIN — POTASSIUM CHLORIDE 60 MEQ: 750 TABLET, FILM COATED, EXTENDED RELEASE ORAL at 08:23

## 2019-01-01 RX ADMIN — ALBUMIN (HUMAN) 12.5 G: 12.5 INJECTION, SOLUTION INTRAVENOUS at 15:08

## 2019-01-01 RX ADMIN — OXYCODONE 5 MG: 5 TABLET ORAL at 18:36

## 2019-01-01 RX ADMIN — MILRINONE LACTATE IN DEXTROSE 0.2 MCG/KG/MIN: 200 INJECTION, SOLUTION INTRAVENOUS at 18:19

## 2019-01-01 RX ADMIN — LEVETIRACETAM 250 MG: 100 SOLUTION ORAL at 20:36

## 2019-01-01 RX ADMIN — POLYETHYLENE GLYCOL 3350 17 G: 17 POWDER, FOR SOLUTION ORAL at 08:16

## 2019-01-01 RX ADMIN — CASTOR OIL AND BALSAM, PERU: 788; 87 OINTMENT TOPICAL at 16:28

## 2019-01-01 RX ADMIN — PANTOPRAZOLE SODIUM 40 MG: 40 TABLET, DELAYED RELEASE ORAL at 06:15

## 2019-01-01 RX ADMIN — DOCUSATE SODIUM AND SENNOSIDES 1 TABLET: 50; 8.6 TABLET, FILM COATED ORAL at 08:47

## 2019-01-01 RX ADMIN — SALINE NASAL SPRAY 2 SPRAY: 1.5 SOLUTION NASAL at 14:18

## 2019-01-01 RX ADMIN — ONDANSETRON 4 MG: 2 INJECTION INTRAMUSCULAR; INTRAVENOUS at 04:03

## 2019-01-01 RX ADMIN — ONDANSETRON HYDROCHLORIDE 4 MG: 2 INJECTION, SOLUTION INTRAMUSCULAR; INTRAVENOUS at 08:01

## 2019-01-01 RX ADMIN — SENNOSIDES AND DOCUSATE SODIUM 1 TABLET: 8.6; 5 TABLET ORAL at 09:13

## 2019-01-01 RX ADMIN — CHLORHEXIDINE GLUCONATE 10 ML: 1.2 RINSE ORAL at 09:00

## 2019-01-01 RX ADMIN — Medication 10 ML: at 15:21

## 2019-01-01 RX ADMIN — DRONABINOL 5 MG: 2.5 CAPSULE ORAL at 17:45

## 2019-01-01 RX ADMIN — ALLOPURINOL 100 MG: 100 TABLET ORAL at 09:31

## 2019-01-01 RX ADMIN — EPOETIN ALFA-EPBX 20000 UNITS: 10000 INJECTION, SOLUTION INTRAVENOUS; SUBCUTANEOUS at 22:02

## 2019-01-01 RX ADMIN — PANTOPRAZOLE SODIUM 40 MG: 40 TABLET, DELAYED RELEASE ORAL at 06:36

## 2019-01-01 RX ADMIN — BUMETANIDE 2 MG: 0.25 INJECTION INTRAMUSCULAR; INTRAVENOUS at 07:17

## 2019-01-01 RX ADMIN — SODIUM CHLORIDE 9 ML/HR: 900 INJECTION, SOLUTION INTRAVENOUS at 12:24

## 2019-01-01 RX ADMIN — CASTOR OIL AND BALSAM, PERU: 788; 87 OINTMENT TOPICAL at 21:38

## 2019-01-01 RX ADMIN — MILRINONE LACTATE IN DEXTROSE 0.3 MCG/KG/MIN: 200 INJECTION, SOLUTION INTRAVENOUS at 21:17

## 2019-01-01 RX ADMIN — Medication 10 ML: at 15:51

## 2019-01-01 RX ADMIN — CASTOR OIL AND BALSAM, PERU: 788; 87 OINTMENT TOPICAL at 15:18

## 2019-01-01 RX ADMIN — OCTREOTIDE ACETATE 25 MCG: 50 INJECTION, SOLUTION INTRAVENOUS; SUBCUTANEOUS at 08:25

## 2019-01-01 RX ADMIN — Medication 10 ML: at 06:35

## 2019-01-01 RX ADMIN — CASTOR OIL AND BALSAM, PERU: 788; 87 OINTMENT TOPICAL at 08:14

## 2019-01-01 RX ADMIN — LEVETIRACETAM 125 MG: 250 TABLET ORAL at 17:09

## 2019-01-01 RX ADMIN — OCTREOTIDE ACETATE 25 MCG: 50 INJECTION, SOLUTION INTRAVENOUS; SUBCUTANEOUS at 08:33

## 2019-01-01 RX ADMIN — MILRINONE LACTATE IN DEXTROSE 0.3 MCG/KG/MIN: 200 INJECTION, SOLUTION INTRAVENOUS at 07:44

## 2019-01-01 RX ADMIN — SUCRALFATE 1 G: 1 TABLET ORAL at 11:22

## 2019-01-01 RX ADMIN — MILRINONE LACTATE IN DEXTROSE 0.3 MCG/KG/MIN: 200 INJECTION, SOLUTION INTRAVENOUS at 22:07

## 2019-01-01 RX ADMIN — OXYCODONE HYDROCHLORIDE 5 MG: 5 TABLET ORAL at 01:27

## 2019-01-01 RX ADMIN — BUMETANIDE 2 MG: 0.25 INJECTION INTRAMUSCULAR; INTRAVENOUS at 08:42

## 2019-01-01 RX ADMIN — PANTOPRAZOLE SODIUM 40 MG: 40 TABLET, DELAYED RELEASE ORAL at 07:24

## 2019-01-01 RX ADMIN — OXYMETAZOLINE HYDROCHLORIDE 2 SPRAY: 0.05 SPRAY NASAL at 19:03

## 2019-01-01 RX ADMIN — Medication 1 CAPSULE: at 08:55

## 2019-01-01 RX ADMIN — FINASTERIDE 5 MG: 5 TABLET, FILM COATED ORAL at 08:42

## 2019-01-01 RX ADMIN — BUMETANIDE 2 MG: 1 TABLET ORAL at 08:51

## 2019-01-01 RX ADMIN — HYDRALAZINE HYDROCHLORIDE 10 MG: 10 TABLET, FILM COATED ORAL at 21:42

## 2019-01-01 RX ADMIN — POTASSIUM CHLORIDE 40 MEQ: 750 TABLET, FILM COATED, EXTENDED RELEASE ORAL at 09:23

## 2019-01-01 RX ADMIN — MIRTAZAPINE 15 MG: 15 TABLET, FILM COATED ORAL at 21:49

## 2019-01-01 RX ADMIN — CASTOR OIL AND BALSAM, PERU: 788; 87 OINTMENT TOPICAL at 08:09

## 2019-01-01 RX ADMIN — LEVETIRACETAM 125 MG: 250 TABLET ORAL at 17:20

## 2019-01-01 RX ADMIN — ALLOPURINOL 100 MG: 100 TABLET ORAL at 09:39

## 2019-01-01 RX ADMIN — MORPHINE SULFATE 4 MG: 4 INJECTION, SOLUTION INTRAMUSCULAR; INTRAVENOUS at 20:28

## 2019-01-01 RX ADMIN — SUCRALFATE 1 G: 1 TABLET ORAL at 16:05

## 2019-01-01 RX ADMIN — INSULIN LISPRO 2 UNITS: 100 INJECTION, SOLUTION INTRAVENOUS; SUBCUTANEOUS at 12:49

## 2019-01-01 RX ADMIN — CEFAZOLIN 2 G: 330 INJECTION, POWDER, FOR SOLUTION INTRAMUSCULAR; INTRAVENOUS at 08:13

## 2019-01-01 RX ADMIN — PANTOPRAZOLE SODIUM 40 MG: 40 TABLET, DELAYED RELEASE ORAL at 09:19

## 2019-01-01 RX ADMIN — TAMSULOSIN HYDROCHLORIDE 0.8 MG: 0.4 CAPSULE ORAL at 08:57

## 2019-01-01 RX ADMIN — MORPHINE SULFATE 4 MG: 4 INJECTION, SOLUTION INTRAMUSCULAR; INTRAVENOUS at 04:58

## 2019-01-01 RX ADMIN — Medication 10 ML: at 06:57

## 2019-01-01 RX ADMIN — Medication 40 ML: at 22:06

## 2019-01-01 RX ADMIN — Medication 10 ML: at 21:24

## 2019-01-01 RX ADMIN — OCTREOTIDE ACETATE 25 MCG: 50 INJECTION, SOLUTION INTRAVENOUS; SUBCUTANEOUS at 11:46

## 2019-01-01 RX ADMIN — ALLOPURINOL 100 MG: 100 TABLET ORAL at 10:04

## 2019-01-01 RX ADMIN — OCTREOTIDE ACETATE 25 MCG: 50 INJECTION, SOLUTION INTRAVENOUS; SUBCUTANEOUS at 21:23

## 2019-01-01 RX ADMIN — IPRATROPIUM BROMIDE AND ALBUTEROL SULFATE 3 ML: .5; 3 SOLUTION RESPIRATORY (INHALATION) at 12:09

## 2019-01-01 RX ADMIN — IPRATROPIUM BROMIDE 0.5 MG: 0.5 SOLUTION RESPIRATORY (INHALATION) at 15:52

## 2019-01-01 RX ADMIN — Medication 10 ML: at 13:53

## 2019-01-01 RX ADMIN — BUDESONIDE 500 MCG: 0.5 INHALANT RESPIRATORY (INHALATION) at 19:09

## 2019-01-01 RX ADMIN — ALLOPURINOL 100 MG: 100 TABLET ORAL at 09:51

## 2019-01-01 RX ADMIN — DRONABINOL 2.5 MG: 2.5 CAPSULE ORAL at 17:16

## 2019-01-01 RX ADMIN — PANTOPRAZOLE SODIUM 40 MG: 40 TABLET, DELAYED RELEASE ORAL at 07:19

## 2019-01-01 RX ADMIN — POLYETHYLENE GLYCOL 3350 17 G: 17 POWDER, FOR SOLUTION ORAL at 08:43

## 2019-01-01 RX ADMIN — ACETAMINOPHEN 650 MG: 325 TABLET, FILM COATED ORAL at 21:30

## 2019-01-01 RX ADMIN — Medication 10 ML: at 21:07

## 2019-01-01 RX ADMIN — FINASTERIDE 5 MG: 5 TABLET, FILM COATED ORAL at 08:39

## 2019-01-01 RX ADMIN — ALLOPURINOL 100 MG: 100 TABLET ORAL at 09:10

## 2019-01-01 RX ADMIN — EPOETIN ALFA-EPBX 10000 UNITS: 10000 INJECTION, SOLUTION INTRAVENOUS; SUBCUTANEOUS at 20:28

## 2019-01-01 RX ADMIN — SILDENAFIL 40 MG: 20 TABLET ORAL at 21:13

## 2019-01-01 RX ADMIN — Medication 10 ML: at 06:03

## 2019-01-01 RX ADMIN — INSULIN LISPRO 2 UNITS: 100 INJECTION, SOLUTION INTRAVENOUS; SUBCUTANEOUS at 11:39

## 2019-01-01 RX ADMIN — SILDENAFIL CITRATE 20 MG: 20 TABLET, FILM COATED ORAL at 09:01

## 2019-01-01 RX ADMIN — SILDENAFIL 40 MG: 20 TABLET ORAL at 22:03

## 2019-01-01 RX ADMIN — HYDRALAZINE HYDROCHLORIDE 10 MG: 10 TABLET, FILM COATED ORAL at 17:01

## 2019-01-01 RX ADMIN — SENNOSIDES AND DOCUSATE SODIUM 1 TABLET: 8.6; 5 TABLET ORAL at 09:24

## 2019-01-01 RX ADMIN — BIVALIRUDIN 0.22 MG/KG/HR: 250 INJECTION, POWDER, LYOPHILIZED, FOR SOLUTION INTRAVENOUS at 13:05

## 2019-01-01 RX ADMIN — MILRINONE LACTATE IN DEXTROSE 0.3 MCG/KG/MIN: 200 INJECTION, SOLUTION INTRAVENOUS at 09:09

## 2019-01-01 RX ADMIN — MORPHINE SULFATE 4 MG: 4 INJECTION, SOLUTION INTRAMUSCULAR; INTRAVENOUS at 18:10

## 2019-01-01 RX ADMIN — HYDRALAZINE HYDROCHLORIDE 10 MG: 10 TABLET, FILM COATED ORAL at 21:16

## 2019-01-01 RX ADMIN — LEVETIRACETAM 125 MG: 250 TABLET ORAL at 17:42

## 2019-01-01 RX ADMIN — Medication 10 ML: at 16:29

## 2019-01-01 RX ADMIN — SALINE NASAL SPRAY 2 SPRAY: 1.5 SOLUTION NASAL at 21:09

## 2019-01-01 RX ADMIN — ESCITALOPRAM OXALATE 10 MG: 10 TABLET ORAL at 17:20

## 2019-01-01 RX ADMIN — SUCRALFATE 1 G: 1 TABLET ORAL at 21:04

## 2019-01-01 RX ADMIN — IPRATROPIUM BROMIDE AND ALBUTEROL SULFATE 3 ML: .5; 3 SOLUTION RESPIRATORY (INHALATION) at 03:16

## 2019-01-01 RX ADMIN — Medication 400 MG: at 09:13

## 2019-01-01 RX ADMIN — ALBUMIN (HUMAN) 12.5 G: 0.25 INJECTION, SOLUTION INTRAVENOUS at 08:09

## 2019-01-01 RX ADMIN — SILDENAFIL CITRATE 20 MG: 20 TABLET, FILM COATED ORAL at 02:04

## 2019-01-01 RX ADMIN — TAMSULOSIN HYDROCHLORIDE 0.4 MG: 0.4 CAPSULE ORAL at 08:50

## 2019-01-01 RX ADMIN — POTASSIUM CHLORIDE 10 MEQ: 200 INJECTION, SOLUTION INTRAVENOUS at 09:21

## 2019-01-01 RX ADMIN — Medication 2 G: at 11:46

## 2019-01-01 RX ADMIN — AMIODARONE HYDROCHLORIDE 100 MG: 200 TABLET ORAL at 08:26

## 2019-01-01 RX ADMIN — Medication 10 ML: at 22:24

## 2019-01-01 RX ADMIN — Medication 10 ML: at 17:35

## 2019-01-01 RX ADMIN — SUCRALFATE 1 G: 1 TABLET ORAL at 12:38

## 2019-01-01 RX ADMIN — PANTOPRAZOLE SODIUM 40 MG: 40 TABLET, DELAYED RELEASE ORAL at 07:04

## 2019-01-01 RX ADMIN — OXYCODONE HYDROCHLORIDE 10 MG: 5 TABLET ORAL at 12:29

## 2019-01-01 RX ADMIN — Medication 2 G: at 09:28

## 2019-01-01 RX ADMIN — PANTOPRAZOLE SODIUM 40 MG: 40 TABLET, DELAYED RELEASE ORAL at 07:52

## 2019-01-01 RX ADMIN — SODIUM CHLORIDE 40 MG: 9 INJECTION INTRAMUSCULAR; INTRAVENOUS; SUBCUTANEOUS at 08:55

## 2019-01-01 RX ADMIN — OXYCODONE HYDROCHLORIDE 10 MG: 5 TABLET ORAL at 23:22

## 2019-01-01 RX ADMIN — OCTREOTIDE ACETATE 25 MCG: 50 INJECTION, SOLUTION INTRAVENOUS; SUBCUTANEOUS at 17:50

## 2019-01-01 RX ADMIN — BUMETANIDE 1 MG: 0.25 INJECTION INTRAMUSCULAR; INTRAVENOUS at 09:39

## 2019-01-01 RX ADMIN — HYDRALAZINE HYDROCHLORIDE 10 MG: 10 TABLET, FILM COATED ORAL at 09:28

## 2019-01-01 RX ADMIN — ARFORMOTEROL TARTRATE 15 MCG: 15 SOLUTION RESPIRATORY (INHALATION) at 19:47

## 2019-01-01 RX ADMIN — MILRINONE LACTATE IN DEXTROSE 0.3 MCG/KG/MIN: 200 INJECTION, SOLUTION INTRAVENOUS at 19:17

## 2019-01-01 RX ADMIN — SUCRALFATE 1 G: 1 TABLET ORAL at 16:39

## 2019-01-01 RX ADMIN — MAGNESIUM OXIDE TAB 400 MG (241.3 MG ELEMENTAL MG) 400 MG: 400 (241.3 MG) TAB at 17:07

## 2019-01-01 RX ADMIN — Medication 2 G: at 17:24

## 2019-01-01 RX ADMIN — DOCUSATE SODIUM AND SENNOSIDES 1 TABLET: 50; 8.6 TABLET, FILM COATED ORAL at 08:55

## 2019-01-01 RX ADMIN — OXYCODONE HYDROCHLORIDE 10 MG: 5 TABLET ORAL at 07:17

## 2019-01-01 RX ADMIN — WARFARIN SODIUM 1 MG: 1 TABLET ORAL at 17:41

## 2019-01-01 RX ADMIN — MAGNESIUM OXIDE TAB 400 MG (241.3 MG ELEMENTAL MG) 800 MG: 400 (241.3 MG) TAB at 09:24

## 2019-01-01 RX ADMIN — POTASSIUM CHLORIDE 20 MEQ: 750 TABLET, FILM COATED, EXTENDED RELEASE ORAL at 21:24

## 2019-01-01 RX ADMIN — ALLOPURINOL 100 MG: 100 TABLET ORAL at 09:13

## 2019-01-01 RX ADMIN — Medication 10 ML: at 21:50

## 2019-01-01 RX ADMIN — CASTOR OIL AND BALSAM, PERU: 788; 87 OINTMENT TOPICAL at 16:55

## 2019-01-01 RX ADMIN — ROCURONIUM BROMIDE 70 MG: 10 SOLUTION INTRAVENOUS at 07:55

## 2019-01-01 RX ADMIN — SUCRALFATE 1 G: 1 TABLET ORAL at 22:31

## 2019-01-01 RX ADMIN — CEFAZOLIN 1 G: 1 INJECTION, POWDER, FOR SOLUTION INTRAMUSCULAR; INTRAVENOUS at 21:04

## 2019-01-01 RX ADMIN — MILRINONE LACTATE IN DEXTROSE 0.25 MCG/KG/MIN: 200 INJECTION, SOLUTION INTRAVENOUS at 12:49

## 2019-01-01 RX ADMIN — POTASSIUM CHLORIDE 20 MEQ: 29.8 INJECTION, SOLUTION INTRAVENOUS at 08:19

## 2019-01-01 RX ADMIN — PIPERACILLIN AND TAZOBACTAM 3.38 G: 3; .375 INJECTION, POWDER, LYOPHILIZED, FOR SOLUTION INTRAVENOUS at 11:55

## 2019-01-01 RX ADMIN — SUFENTANIL CITRATE: 50 INJECTION EPIDURAL; INTRAVENOUS at 11:17

## 2019-01-01 RX ADMIN — DRONABINOL 5 MG: 2.5 CAPSULE ORAL at 07:06

## 2019-01-01 RX ADMIN — CEFEPIME 2 G: 2 INJECTION, POWDER, FOR SOLUTION INTRAVENOUS at 13:07

## 2019-01-01 RX ADMIN — BUMETANIDE 2 MG: 0.25 INJECTION INTRAMUSCULAR; INTRAVENOUS at 09:16

## 2019-01-01 RX ADMIN — ALBUMIN (HUMAN) 12.5 G: 12.5 INJECTION, SOLUTION INTRAVENOUS at 09:28

## 2019-01-01 RX ADMIN — CHLORHEXIDINE GLUCONATE 10 ML: 1.2 RINSE ORAL at 13:15

## 2019-01-01 RX ADMIN — DOCUSATE SODIUM AND SENNOSIDES 1 TABLET: 50; 8.6 TABLET, FILM COATED ORAL at 17:27

## 2019-01-01 RX ADMIN — MAGNESIUM OXIDE TAB 400 MG (241.3 MG ELEMENTAL MG) 400 MG: 400 (241.3 MG) TAB at 17:00

## 2019-01-01 RX ADMIN — CASTOR OIL AND BALSAM, PERU: 788; 87 OINTMENT TOPICAL at 11:04

## 2019-01-01 RX ADMIN — LEVETIRACETAM 250 MG: 100 SOLUTION ORAL at 07:02

## 2019-01-01 RX ADMIN — BIVALIRUDIN 0.07 MG/KG/HR: 250 INJECTION, POWDER, LYOPHILIZED, FOR SOLUTION INTRAVENOUS at 13:50

## 2019-01-01 RX ADMIN — SUCRALFATE 1 G: 1 TABLET ORAL at 17:32

## 2019-01-01 RX ADMIN — LEVETIRACETAM 250 MG: 100 SOLUTION ORAL at 06:33

## 2019-01-01 RX ADMIN — IPRATROPIUM BROMIDE AND ALBUTEROL SULFATE 3 ML: .5; 3 SOLUTION RESPIRATORY (INHALATION) at 22:06

## 2019-01-01 RX ADMIN — CHLORHEXIDINE GLUCONATE 10 ML: 1.2 RINSE ORAL at 21:40

## 2019-01-01 RX ADMIN — SILDENAFIL 40 MG: 20 TABLET ORAL at 21:52

## 2019-01-01 RX ADMIN — SILDENAFIL CITRATE 20 MG: 20 TABLET ORAL at 21:21

## 2019-01-01 RX ADMIN — BUDESONIDE 500 MCG: 0.5 INHALANT RESPIRATORY (INHALATION) at 21:11

## 2019-01-01 RX ADMIN — CASTOR OIL AND BALSAM, PERU: 788; 87 OINTMENT TOPICAL at 16:58

## 2019-01-01 RX ADMIN — IPRATROPIUM BROMIDE AND ALBUTEROL SULFATE 3 ML: .5; 3 SOLUTION RESPIRATORY (INHALATION) at 21:24

## 2019-01-01 RX ADMIN — SILDENAFIL 40 MG: 20 TABLET ORAL at 21:37

## 2019-01-01 RX ADMIN — DIGOXIN 0.25 MG: 250 TABLET ORAL at 08:23

## 2019-01-01 RX ADMIN — SUCRALFATE 1 G: 1 TABLET ORAL at 17:16

## 2019-01-01 RX ADMIN — BUDESONIDE 500 MCG: 0.5 INHALANT RESPIRATORY (INHALATION) at 08:11

## 2019-01-01 RX ADMIN — Medication 10 ML: at 17:07

## 2019-01-01 RX ADMIN — POLYETHYLENE GLYCOL 3350 17 G: 17 POWDER, FOR SOLUTION ORAL at 09:29

## 2019-01-01 RX ADMIN — Medication 10 ML: at 14:40

## 2019-01-01 RX ADMIN — ALTEPLASE 1 MG: 2.2 INJECTION, POWDER, LYOPHILIZED, FOR SOLUTION INTRAVENOUS at 07:32

## 2019-01-01 RX ADMIN — SODIUM CHLORIDE, POTASSIUM CHLORIDE, SODIUM LACTATE AND CALCIUM CHLORIDE: 600; 310; 30; 20 INJECTION, SOLUTION INTRAVENOUS at 08:00

## 2019-01-01 RX ADMIN — INSULIN LISPRO 2 UNITS: 100 INJECTION, SOLUTION INTRAVENOUS; SUBCUTANEOUS at 18:30

## 2019-01-01 RX ADMIN — Medication 10 ML: at 17:02

## 2019-01-01 RX ADMIN — Medication 10 ML: at 21:06

## 2019-01-01 RX ADMIN — SILDENAFIL CITRATE 20 MG: 20 TABLET ORAL at 08:57

## 2019-01-01 RX ADMIN — Medication 10 ML: at 23:44

## 2019-01-01 RX ADMIN — BUDESONIDE 500 MCG: 0.5 INHALANT RESPIRATORY (INHALATION) at 07:46

## 2019-01-01 RX ADMIN — OXYCODONE HYDROCHLORIDE 5 MG: 5 TABLET ORAL at 12:07

## 2019-01-01 RX ADMIN — EPOETIN ALFA-EPBX 20000 UNITS: 10000 INJECTION, SOLUTION INTRAVENOUS; SUBCUTANEOUS at 21:17

## 2019-01-01 RX ADMIN — ASPIRIN 81 MG 81 MG: 81 TABLET ORAL at 08:43

## 2019-01-01 RX ADMIN — TAMSULOSIN HYDROCHLORIDE 0.4 MG: 0.4 CAPSULE ORAL at 09:01

## 2019-01-01 RX ADMIN — IPRATROPIUM BROMIDE AND ALBUTEROL SULFATE 3 ML: .5; 3 SOLUTION RESPIRATORY (INHALATION) at 15:31

## 2019-01-01 RX ADMIN — PANTOPRAZOLE SODIUM 40 MG: 40 TABLET, DELAYED RELEASE ORAL at 07:31

## 2019-01-01 RX ADMIN — ACETAMINOPHEN 650 MG: 325 TABLET, FILM COATED ORAL at 06:09

## 2019-01-01 RX ADMIN — LEVETIRACETAM 250 MG: 100 SOLUTION ORAL at 06:31

## 2019-01-01 RX ADMIN — DEXTROSE MONOHYDRATE 16 ML/HR: 5 INJECTION, SOLUTION INTRAVENOUS at 21:13

## 2019-01-01 RX ADMIN — MILRINONE LACTATE IN DEXTROSE 0.38 MCG/KG/MIN: 200 INJECTION, SOLUTION INTRAVENOUS at 03:08

## 2019-01-01 RX ADMIN — Medication 2 G: at 10:48

## 2019-01-01 RX ADMIN — CEFAZOLIN 1 G: 1 INJECTION, POWDER, FOR SOLUTION INTRAMUSCULAR; INTRAVENOUS; PARENTERAL at 00:26

## 2019-01-01 RX ADMIN — MILRINONE LACTATE IN DEXTROSE 0.2 MCG/KG/MIN: 200 INJECTION, SOLUTION INTRAVENOUS at 01:19

## 2019-01-01 RX ADMIN — OXYCODONE 5 MG: 5 TABLET ORAL at 03:01

## 2019-01-01 RX ADMIN — MIRTAZAPINE 15 MG: 15 TABLET, FILM COATED ORAL at 21:38

## 2019-01-01 RX ADMIN — MILRINONE LACTATE IN DEXTROSE 0.38 MCG/KG/MIN: 200 INJECTION, SOLUTION INTRAVENOUS at 01:16

## 2019-01-01 RX ADMIN — SILDENAFIL CITRATE 20 MG: 20 TABLET ORAL at 10:43

## 2019-01-01 RX ADMIN — SUCRALFATE 1 G: 1 TABLET ORAL at 17:50

## 2019-01-01 RX ADMIN — CEFEPIME 2 G: 2 INJECTION, POWDER, FOR SOLUTION INTRAVENOUS at 21:31

## 2019-01-01 RX ADMIN — POTASSIUM CHLORIDE 60 MEQ: 750 TABLET, FILM COATED, EXTENDED RELEASE ORAL at 08:08

## 2019-01-01 RX ADMIN — WARFARIN SODIUM 5 MG: 5 TABLET ORAL at 17:33

## 2019-01-01 RX ADMIN — DRONABINOL 2.5 MG: 2.5 CAPSULE ORAL at 08:04

## 2019-01-01 RX ADMIN — STANDARDIZED SENNA CONCENTRATE AND DOCUSATE SODIUM 1 TABLET: 8.6; 5 TABLET ORAL at 08:57

## 2019-01-01 RX ADMIN — Medication 1 CAPSULE: at 08:07

## 2019-01-01 RX ADMIN — SUCRALFATE 1 G: 1 TABLET ORAL at 11:35

## 2019-01-01 RX ADMIN — PIPERACILLIN AND TAZOBACTAM 3.38 G: 3; .375 INJECTION, POWDER, LYOPHILIZED, FOR SOLUTION INTRAVENOUS at 00:25

## 2019-01-01 RX ADMIN — ALBUTEROL SULFATE 2.5 MG: 2.5 SOLUTION RESPIRATORY (INHALATION) at 19:49

## 2019-01-01 RX ADMIN — MAGNESIUM OXIDE TAB 400 MG (241.3 MG ELEMENTAL MG) 400 MG: 400 (241.3 MG) TAB at 09:13

## 2019-01-01 RX ADMIN — DEXTROSE MONOHYDRATE 6.1 ML/HR: 5 INJECTION, SOLUTION INTRAVENOUS at 22:10

## 2019-01-01 RX ADMIN — SUCRALFATE 1 G: 1 TABLET ORAL at 11:04

## 2019-01-01 RX ADMIN — MAGNESIUM SULFATE HEPTAHYDRATE 1 G: 1 INJECTION, SOLUTION INTRAVENOUS at 06:42

## 2019-01-01 RX ADMIN — MAGNESIUM OXIDE TAB 400 MG (241.3 MG ELEMENTAL MG) 400 MG: 400 (241.3 MG) TAB at 08:31

## 2019-01-01 RX ADMIN — POTASSIUM CHLORIDE 20 MEQ: 400 INJECTION, SOLUTION INTRAVENOUS at 09:40

## 2019-01-01 RX ADMIN — BUMETANIDE 2 MG: 0.25 INJECTION INTRAMUSCULAR; INTRAVENOUS at 15:03

## 2019-01-01 RX ADMIN — WARFARIN SODIUM 1 MG: 1 TABLET ORAL at 16:42

## 2019-01-01 RX ADMIN — ASPIRIN 75 MG: 300 SUPPOSITORY RECTAL at 12:23

## 2019-01-01 RX ADMIN — PANTOPRAZOLE SODIUM 40 MG: 40 TABLET, DELAYED RELEASE ORAL at 06:43

## 2019-01-01 RX ADMIN — SENNOSIDES AND DOCUSATE SODIUM 1 TABLET: 8.6; 5 TABLET ORAL at 08:10

## 2019-01-01 RX ADMIN — SUCRALFATE 1 G: 1 TABLET ORAL at 16:21

## 2019-01-01 RX ADMIN — SILDENAFIL 40 MG: 20 TABLET ORAL at 17:41

## 2019-01-01 RX ADMIN — SALINE NASAL SPRAY 2 SPRAY: 1.5 SOLUTION NASAL at 17:03

## 2019-01-01 RX ADMIN — EPOETIN ALFA-EPBX 20000 UNITS: 10000 INJECTION, SOLUTION INTRAVENOUS; SUBCUTANEOUS at 07:11

## 2019-01-01 RX ADMIN — ACETAMINOPHEN 650 MG: 325 TABLET, FILM COATED ORAL at 21:40

## 2019-01-01 RX ADMIN — MIRTAZAPINE 15 MG: 15 TABLET, FILM COATED ORAL at 21:58

## 2019-01-01 RX ADMIN — SILDENAFIL CITRATE 20 MG: 20 TABLET, FILM COATED ORAL at 01:51

## 2019-01-01 RX ADMIN — MAGNESIUM OXIDE TAB 400 MG (241.3 MG ELEMENTAL MG) 400 MG: 400 (241.3 MG) TAB at 17:49

## 2019-01-01 RX ADMIN — PANTOPRAZOLE SODIUM 40 MG: 40 TABLET, DELAYED RELEASE ORAL at 06:08

## 2019-01-01 RX ADMIN — DRONABINOL 2.5 MG: 2.5 CAPSULE ORAL at 17:11

## 2019-01-01 RX ADMIN — MILRINONE LACTATE IN DEXTROSE 0.2 MCG/KG/MIN: 200 INJECTION, SOLUTION INTRAVENOUS at 04:27

## 2019-01-01 RX ADMIN — LEVETIRACETAM 250 MG: 100 SOLUTION ORAL at 18:13

## 2019-01-01 RX ADMIN — POTASSIUM CHLORIDE 60 MEQ: 750 TABLET, FILM COATED, EXTENDED RELEASE ORAL at 08:16

## 2019-01-01 RX ADMIN — WARFARIN SODIUM 4 MG: 2 TABLET ORAL at 16:06

## 2019-01-01 RX ADMIN — BUDESONIDE 500 MCG: 0.5 INHALANT RESPIRATORY (INHALATION) at 09:35

## 2019-01-01 RX ADMIN — WARFARIN SODIUM 2.5 MG: 2.5 TABLET ORAL at 17:43

## 2019-01-01 RX ADMIN — SUCRALFATE 1 G: 1 TABLET ORAL at 21:40

## 2019-01-01 RX ADMIN — Medication 10 ML: at 22:04

## 2019-01-01 RX ADMIN — SUCRALFATE 1 G: 1 TABLET ORAL at 12:05

## 2019-01-01 RX ADMIN — PIPERACILLIN AND TAZOBACTAM 3.38 G: 3; .375 INJECTION, POWDER, LYOPHILIZED, FOR SOLUTION INTRAVENOUS at 11:04

## 2019-01-01 RX ADMIN — SUCRALFATE 1 G: 1 TABLET ORAL at 21:15

## 2019-01-01 RX ADMIN — INSULIN LISPRO 2 UNITS: 100 INJECTION, SOLUTION INTRAVENOUS; SUBCUTANEOUS at 13:22

## 2019-01-01 RX ADMIN — SODIUM CHLORIDE 40 MG: 9 INJECTION INTRAMUSCULAR; INTRAVENOUS; SUBCUTANEOUS at 09:00

## 2019-01-01 RX ADMIN — AMIODARONE HYDROCHLORIDE 100 MG: 200 TABLET ORAL at 10:00

## 2019-01-01 RX ADMIN — Medication 10 ML: at 22:05

## 2019-01-01 RX ADMIN — MILRINONE LACTATE IN DEXTROSE 0.38 MCG/KG/MIN: 200 INJECTION, SOLUTION INTRAVENOUS at 15:13

## 2019-01-01 RX ADMIN — Medication 40 ML: at 07:58

## 2019-01-01 RX ADMIN — SUCRALFATE 1 G: 1 TABLET ORAL at 17:33

## 2019-01-01 RX ADMIN — Medication 10 ML: at 21:09

## 2019-01-01 RX ADMIN — CHLORHEXIDINE GLUCONATE 10 ML: 1.2 RINSE ORAL at 10:06

## 2019-01-01 RX ADMIN — PANTOPRAZOLE SODIUM 40 MG: 40 TABLET, DELAYED RELEASE ORAL at 07:01

## 2019-01-01 RX ADMIN — FLUTICASONE PROPIONATE 2 SPRAY: 50 SPRAY, METERED NASAL at 17:42

## 2019-01-01 RX ADMIN — SALINE NASAL SPRAY 2 SPRAY: 1.5 SOLUTION NASAL at 12:00

## 2019-01-01 RX ADMIN — MILRINONE LACTATE IN DEXTROSE 0.3 MCG/KG/MIN: 200 INJECTION, SOLUTION INTRAVENOUS at 22:15

## 2019-01-01 RX ADMIN — Medication 1 CAPSULE: at 09:10

## 2019-01-01 RX ADMIN — MAGNESIUM OXIDE TAB 400 MG (241.3 MG ELEMENTAL MG) 400 MG: 400 (241.3 MG) TAB at 09:10

## 2019-01-01 RX ADMIN — CASTOR OIL AND BALSAM, PERU: 788; 87 OINTMENT TOPICAL at 08:46

## 2019-01-01 RX ADMIN — SALINE NASAL SPRAY 2 SPRAY: 1.5 SOLUTION NASAL at 17:41

## 2019-01-01 RX ADMIN — Medication 400 MG: at 21:16

## 2019-01-01 RX ADMIN — CEFAZOLIN 1 G: 1 INJECTION, POWDER, FOR SOLUTION INTRAMUSCULAR; INTRAVENOUS; PARENTERAL at 16:13

## 2019-01-01 RX ADMIN — DRONABINOL 2.5 MG: 2.5 CAPSULE ORAL at 07:01

## 2019-01-01 RX ADMIN — STANDARDIZED SENNA CONCENTRATE AND DOCUSATE SODIUM 1 TABLET: 8.6; 5 TABLET ORAL at 09:25

## 2019-01-01 RX ADMIN — Medication 10 ML: at 22:02

## 2019-01-01 RX ADMIN — DIGOXIN 0.25 MG: 250 TABLET ORAL at 09:45

## 2019-01-01 RX ADMIN — SODIUM CHLORIDE 500 ML: 900 INJECTION, SOLUTION INTRAVENOUS at 14:42

## 2019-01-01 RX ADMIN — MAGNESIUM OXIDE TAB 400 MG (241.3 MG ELEMENTAL MG) 400 MG: 400 (241.3 MG) TAB at 09:25

## 2019-01-01 RX ADMIN — SUCRALFATE 1 G: 1 TABLET ORAL at 17:09

## 2019-01-01 RX ADMIN — MAGNESIUM OXIDE TAB 400 MG (241.3 MG ELEMENTAL MG) 400 MG: 400 (241.3 MG) TAB at 17:25

## 2019-01-01 RX ADMIN — LEVETIRACETAM 250 MG: 100 SOLUTION ORAL at 20:22

## 2019-01-01 RX ADMIN — LEVETIRACETAM 250 MG: 100 INJECTION, SOLUTION, CONCENTRATE INTRAVENOUS at 19:40

## 2019-01-01 RX ADMIN — OXYCODONE HYDROCHLORIDE 5 MG: 5 TABLET ORAL at 06:44

## 2019-01-01 RX ADMIN — SUCRALFATE 1 G: 1 TABLET ORAL at 21:33

## 2019-01-01 RX ADMIN — LEVETIRACETAM 250 MG: 100 INJECTION, SOLUTION, CONCENTRATE INTRAVENOUS at 17:20

## 2019-01-01 RX ADMIN — IPRATROPIUM BROMIDE AND ALBUTEROL SULFATE 3 ML: .5; 3 SOLUTION RESPIRATORY (INHALATION) at 15:05

## 2019-01-01 RX ADMIN — CASTOR OIL AND BALSAM, PERU: 788; 87 OINTMENT TOPICAL at 21:51

## 2019-01-01 RX ADMIN — SODIUM CHLORIDE 40 MG: 9 INJECTION INTRAMUSCULAR; INTRAVENOUS; SUBCUTANEOUS at 08:21

## 2019-01-01 RX ADMIN — MUPIROCIN: 20 OINTMENT TOPICAL at 12:03

## 2019-01-01 RX ADMIN — Medication 10 ML: at 05:34

## 2019-01-01 RX ADMIN — INSULIN LISPRO 2 UNITS: 100 INJECTION, SOLUTION INTRAVENOUS; SUBCUTANEOUS at 17:40

## 2019-01-01 RX ADMIN — POTASSIUM CHLORIDE 40 MEQ: 750 TABLET, FILM COATED, EXTENDED RELEASE ORAL at 11:41

## 2019-01-01 RX ADMIN — MILRINONE LACTATE IN DEXTROSE 0.3 MCG/KG/MIN: 200 INJECTION, SOLUTION INTRAVENOUS at 09:48

## 2019-01-01 RX ADMIN — ASPIRIN 81 MG 81 MG: 81 TABLET ORAL at 09:10

## 2019-01-01 RX ADMIN — POTASSIUM CHLORIDE 20 MEQ: 400 INJECTION, SOLUTION INTRAVENOUS at 06:58

## 2019-01-01 RX ADMIN — CEFAZOLIN 1 G: 1 INJECTION, POWDER, FOR SOLUTION INTRAMUSCULAR; INTRAVENOUS; PARENTERAL at 16:05

## 2019-01-01 RX ADMIN — MAGNESIUM OXIDE TAB 400 MG (241.3 MG ELEMENTAL MG) 400 MG: 400 (241.3 MG) TAB at 17:09

## 2019-01-01 RX ADMIN — ESCITALOPRAM OXALATE 10 MG: 10 TABLET ORAL at 17:03

## 2019-01-01 RX ADMIN — DOCUSATE SODIUM AND SENNOSIDES 1 TABLET: 50; 8.6 TABLET, FILM COATED ORAL at 09:31

## 2019-01-01 RX ADMIN — OXYCODONE HYDROCHLORIDE 5 MG: 5 TABLET ORAL at 22:07

## 2019-01-01 RX ADMIN — ALBUTEROL SULFATE 2.5 MG: 2.5 SOLUTION RESPIRATORY (INHALATION) at 00:20

## 2019-01-01 RX ADMIN — OCTREOTIDE ACETATE 25 MCG: 50 INJECTION, SOLUTION INTRAVENOUS; SUBCUTANEOUS at 21:39

## 2019-01-01 RX ADMIN — DEXTROSE MONOHYDRATE 17.1 ML/HR: 5 INJECTION, SOLUTION INTRAVENOUS at 10:02

## 2019-01-01 RX ADMIN — BUDESONIDE 500 MCG: 0.5 INHALANT RESPIRATORY (INHALATION) at 21:06

## 2019-01-01 RX ADMIN — SILDENAFIL CITRATE 20 MG: 20 TABLET, FILM COATED ORAL at 10:56

## 2019-01-01 RX ADMIN — MUPIROCIN: 20 OINTMENT TOPICAL at 08:14

## 2019-01-01 RX ADMIN — PIPERACILLIN AND TAZOBACTAM 3.38 G: 3; .375 INJECTION, POWDER, FOR SOLUTION INTRAVENOUS at 04:00

## 2019-01-01 RX ADMIN — DRONABINOL 2.5 MG: 2.5 CAPSULE ORAL at 16:29

## 2019-01-01 RX ADMIN — Medication 10 ML: at 22:16

## 2019-01-01 RX ADMIN — CASTOR OIL AND BALSAM, PERU: 788; 87 OINTMENT TOPICAL at 21:23

## 2019-01-01 RX ADMIN — ALBUTEROL SULFATE 2.5 MG: 2.5 SOLUTION RESPIRATORY (INHALATION) at 15:52

## 2019-01-01 RX ADMIN — FLUTICASONE PROPIONATE 2 SPRAY: 50 SPRAY, METERED NASAL at 16:28

## 2019-01-01 RX ADMIN — AMIODARONE HYDROCHLORIDE 1 MG/MIN: 50 INJECTION, SOLUTION INTRAVENOUS at 09:28

## 2019-01-01 RX ADMIN — OXYCODONE HYDROCHLORIDE 10 MG: 5 TABLET ORAL at 18:47

## 2019-01-01 RX ADMIN — HYDRALAZINE HYDROCHLORIDE 10 MG: 10 TABLET, FILM COATED ORAL at 18:49

## 2019-01-01 RX ADMIN — ALBUMIN (HUMAN) 25 G: 12.5 INJECTION, SOLUTION INTRAVENOUS at 01:04

## 2019-01-01 RX ADMIN — POTASSIUM CHLORIDE 20 MEQ: 29.8 INJECTION, SOLUTION INTRAVENOUS at 08:26

## 2019-01-01 RX ADMIN — IPRATROPIUM BROMIDE AND ALBUTEROL SULFATE 3 ML: .5; 3 SOLUTION RESPIRATORY (INHALATION) at 08:00

## 2019-01-01 RX ADMIN — SODIUM CHLORIDE 40 MG: 9 INJECTION INTRAMUSCULAR; INTRAVENOUS; SUBCUTANEOUS at 09:22

## 2019-01-01 RX ADMIN — FLUCONAZOLE 200 MG: 200 INJECTION, SOLUTION INTRAVENOUS at 04:51

## 2019-01-01 RX ADMIN — STANDARDIZED SENNA CONCENTRATE AND DOCUSATE SODIUM 1 TABLET: 8.6; 5 TABLET ORAL at 09:26

## 2019-01-01 RX ADMIN — BUDESONIDE 500 MCG: 0.5 INHALANT RESPIRATORY (INHALATION) at 19:51

## 2019-01-01 RX ADMIN — ESCITALOPRAM OXALATE 10 MG: 10 TABLET ORAL at 17:50

## 2019-01-01 RX ADMIN — IPRATROPIUM BROMIDE AND ALBUTEROL SULFATE 3 ML: .5; 3 SOLUTION RESPIRATORY (INHALATION) at 07:34

## 2019-01-01 RX ADMIN — SUCRALFATE 1 G: 1 TABLET ORAL at 06:31

## 2019-01-01 RX ADMIN — POTASSIUM CHLORIDE 40 MEQ: 750 TABLET, FILM COATED, EXTENDED RELEASE ORAL at 08:21

## 2019-01-01 RX ADMIN — Medication 10 ML: at 06:32

## 2019-01-01 RX ADMIN — ALBUMIN (HUMAN) 12.5 G: 12.5 INJECTION, SOLUTION INTRAVENOUS at 09:24

## 2019-01-01 RX ADMIN — Medication 10 ML: at 05:01

## 2019-01-01 RX ADMIN — ALTEPLASE 1 MG: 2.2 INJECTION, POWDER, LYOPHILIZED, FOR SOLUTION INTRAVENOUS at 22:37

## 2019-01-01 RX ADMIN — OCTREOTIDE ACETATE 25 MCG: 50 INJECTION, SOLUTION INTRAVENOUS; SUBCUTANEOUS at 08:40

## 2019-01-01 RX ADMIN — IPRATROPIUM BROMIDE AND ALBUTEROL SULFATE 3 ML: .5; 3 SOLUTION RESPIRATORY (INHALATION) at 02:19

## 2019-01-01 RX ADMIN — PANTOPRAZOLE SODIUM 40 MG: 40 TABLET, DELAYED RELEASE ORAL at 06:58

## 2019-01-01 RX ADMIN — STANDARDIZED SENNA CONCENTRATE AND DOCUSATE SODIUM 1 TABLET: 8.6; 5 TABLET ORAL at 08:43

## 2019-01-01 RX ADMIN — LEVETIRACETAM 250 MG: 100 SOLUTION ORAL at 20:00

## 2019-01-01 RX ADMIN — OXYCODONE 5 MG: 5 TABLET ORAL at 01:18

## 2019-01-01 RX ADMIN — MAGNESIUM OXIDE TAB 400 MG (241.3 MG ELEMENTAL MG) 400 MG: 400 (241.3 MG) TAB at 09:06

## 2019-01-01 RX ADMIN — AMIODARONE HYDROCHLORIDE 100 MG: 200 TABLET ORAL at 09:06

## 2019-01-01 RX ADMIN — INSULIN LISPRO 2 UNITS: 100 INJECTION, SOLUTION INTRAVENOUS; SUBCUTANEOUS at 08:43

## 2019-01-01 RX ADMIN — DRONABINOL 2.5 MG: 2.5 CAPSULE ORAL at 08:44

## 2019-01-01 RX ADMIN — HYDRALAZINE HYDROCHLORIDE 10 MG: 10 TABLET, FILM COATED ORAL at 09:57

## 2019-01-01 RX ADMIN — LIDOCAINE HYDROCHLORIDE: 20 JELLY TOPICAL at 20:18

## 2019-01-01 RX ADMIN — DOCUSATE SODIUM AND SENNOSIDES 1 TABLET: 50; 8.6 TABLET, FILM COATED ORAL at 17:24

## 2019-01-01 RX ADMIN — DRONABINOL 2.5 MG: 2.5 CAPSULE ORAL at 06:36

## 2019-01-01 RX ADMIN — DRONABINOL 5 MG: 2.5 CAPSULE ORAL at 16:58

## 2019-01-01 RX ADMIN — OCTREOTIDE ACETATE 25 MCG: 50 INJECTION, SOLUTION INTRAVENOUS; SUBCUTANEOUS at 09:34

## 2019-01-01 RX ADMIN — DOCUSATE SODIUM AND SENNOSIDES 1 TABLET: 50; 8.6 TABLET, FILM COATED ORAL at 18:11

## 2019-01-01 RX ADMIN — Medication 10 ML: at 14:00

## 2019-01-01 RX ADMIN — OCTREOTIDE ACETATE 25 MCG: 50 INJECTION, SOLUTION INTRAVENOUS; SUBCUTANEOUS at 13:02

## 2019-01-01 RX ADMIN — CASTOR OIL AND BALSAM, PERU: 788; 87 OINTMENT TOPICAL at 08:44

## 2019-01-01 RX ADMIN — ACETAMINOPHEN 650 MG: 325 TABLET, FILM COATED ORAL at 08:57

## 2019-01-01 RX ADMIN — DRONABINOL 2.5 MG: 2.5 CAPSULE ORAL at 06:11

## 2019-01-01 RX ADMIN — SILDENAFIL 40 MG: 20 TABLET ORAL at 17:04

## 2019-01-01 RX ADMIN — MORPHINE SULFATE 4 MG: 4 INJECTION, SOLUTION INTRAMUSCULAR; INTRAVENOUS at 07:09

## 2019-01-01 RX ADMIN — CASTOR OIL AND BALSAM, PERU: 788; 87 OINTMENT TOPICAL at 22:15

## 2019-01-01 RX ADMIN — ALLOPURINOL 100 MG: 100 TABLET ORAL at 09:25

## 2019-01-01 RX ADMIN — SUCRALFATE 1 G: 1 TABLET ORAL at 12:53

## 2019-01-01 RX ADMIN — IPRATROPIUM BROMIDE AND ALBUTEROL SULFATE 3 ML: .5; 3 SOLUTION RESPIRATORY (INHALATION) at 14:12

## 2019-01-01 RX ADMIN — DRONABINOL 5 MG: 2.5 CAPSULE ORAL at 07:47

## 2019-01-01 RX ADMIN — SUCRALFATE 1 G: 1 TABLET ORAL at 07:52

## 2019-01-01 RX ADMIN — EPOETIN ALFA-EPBX 20000 UNITS: 10000 INJECTION, SOLUTION INTRAVENOUS; SUBCUTANEOUS at 21:04

## 2019-01-01 RX ADMIN — OCTREOTIDE ACETATE 25 MCG: 50 INJECTION, SOLUTION INTRAVENOUS; SUBCUTANEOUS at 22:04

## 2019-01-01 RX ADMIN — MAGNESIUM SULFATE HEPTAHYDRATE 1 G: 1 INJECTION, SOLUTION INTRAVENOUS at 07:17

## 2019-01-01 RX ADMIN — DRONABINOL 5 MG: 2.5 CAPSULE ORAL at 06:34

## 2019-01-01 RX ADMIN — SILDENAFIL CITRATE 20 MG: 20 TABLET ORAL at 15:18

## 2019-01-01 RX ADMIN — DRONABINOL 2.5 MG: 2.5 CAPSULE ORAL at 16:53

## 2019-01-01 RX ADMIN — ALBUMIN (HUMAN) 25 G: 12.5 INJECTION, SOLUTION INTRAVENOUS at 03:03

## 2019-01-01 RX ADMIN — BIVALIRUDIN 0.12 MG/KG/HR: 250 INJECTION, POWDER, LYOPHILIZED, FOR SOLUTION INTRAVENOUS at 01:35

## 2019-01-01 RX ADMIN — THERA TABS 1 TABLET: TAB at 08:42

## 2019-01-01 RX ADMIN — SILDENAFIL CITRATE 20 MG: 20 TABLET ORAL at 08:43

## 2019-01-01 RX ADMIN — IPRATROPIUM BROMIDE AND ALBUTEROL SULFATE 3 ML: .5; 3 SOLUTION RESPIRATORY (INHALATION) at 07:46

## 2019-01-01 RX ADMIN — CEFEPIME HYDROCHLORIDE 2 G: 2 INJECTION, POWDER, FOR SOLUTION INTRAVENOUS at 13:01

## 2019-01-01 RX ADMIN — CASTOR OIL AND BALSAM, PERU: 788; 87 OINTMENT TOPICAL at 15:08

## 2019-01-01 RX ADMIN — PANTOPRAZOLE SODIUM 40 MG: 40 TABLET, DELAYED RELEASE ORAL at 07:47

## 2019-01-01 RX ADMIN — CEFEPIME 2 G: 2 INJECTION, POWDER, FOR SOLUTION INTRAVENOUS at 18:16

## 2019-01-01 RX ADMIN — WATER 1 MG: 1 INJECTION INTRAMUSCULAR; INTRAVENOUS; SUBCUTANEOUS at 11:21

## 2019-01-01 RX ADMIN — MIRTAZAPINE 15 MG: 15 TABLET, FILM COATED ORAL at 21:11

## 2019-01-01 RX ADMIN — LEVETIRACETAM 250 MG: 100 INJECTION, SOLUTION, CONCENTRATE INTRAVENOUS at 18:20

## 2019-01-01 RX ADMIN — DIPHENHYDRAMINE HYDROCHLORIDE 25 MG: 25 CAPSULE ORAL at 23:15

## 2019-01-01 RX ADMIN — OXYCODONE HYDROCHLORIDE 10 MG: 5 TABLET ORAL at 21:07

## 2019-01-01 RX ADMIN — POTASSIUM CHLORIDE 20 MEQ: 400 INJECTION, SOLUTION INTRAVENOUS at 07:43

## 2019-01-01 RX ADMIN — FAMOTIDINE 20 MG: 10 INJECTION, SOLUTION INTRAVENOUS at 11:46

## 2019-01-01 RX ADMIN — Medication 10 ML: at 06:34

## 2019-01-01 RX ADMIN — IPRATROPIUM BROMIDE AND ALBUTEROL SULFATE 3 ML: .5; 3 SOLUTION RESPIRATORY (INHALATION) at 16:09

## 2019-01-01 RX ADMIN — OCTREOTIDE ACETATE 25 MCG: 50 INJECTION, SOLUTION INTRAVENOUS; SUBCUTANEOUS at 18:59

## 2019-01-01 RX ADMIN — CASTOR OIL AND BALSAM, PERU: 788; 87 OINTMENT TOPICAL at 09:26

## 2019-01-01 RX ADMIN — Medication 10 ML: at 14:59

## 2019-01-01 RX ADMIN — FLUTICASONE PROPIONATE 2 SPRAY: 50 SPRAY, METERED NASAL at 08:49

## 2019-01-01 RX ADMIN — PHYTONADIONE 10 MG: 10 INJECTION, EMULSION INTRAMUSCULAR; INTRAVENOUS; SUBCUTANEOUS at 09:18

## 2019-01-01 RX ADMIN — IPRATROPIUM BROMIDE 0.5 MG: 0.5 SOLUTION RESPIRATORY (INHALATION) at 10:23

## 2019-01-01 RX ADMIN — DEXMEDETOMIDINE HYDROCHLORIDE 4 MCG: 100 INJECTION, SOLUTION, CONCENTRATE INTRAVENOUS at 07:50

## 2019-01-01 RX ADMIN — AMIODARONE HYDROCHLORIDE 400 MG: 200 TABLET ORAL at 09:25

## 2019-01-01 RX ADMIN — DRONABINOL 2.5 MG: 2.5 CAPSULE ORAL at 07:08

## 2019-01-01 RX ADMIN — CEFEPIME 2 G: 2 INJECTION, POWDER, FOR SOLUTION INTRAVENOUS at 19:46

## 2019-01-01 RX ADMIN — DRONABINOL 2.5 MG: 2.5 CAPSULE ORAL at 08:51

## 2019-01-01 RX ADMIN — SALINE NASAL SPRAY 2 SPRAY: 1.5 SOLUTION NASAL at 08:49

## 2019-01-01 RX ADMIN — CASTOR OIL AND BALSAM, PERU: 788; 87 OINTMENT TOPICAL at 17:46

## 2019-01-01 RX ADMIN — MAGNESIUM OXIDE TAB 400 MG (241.3 MG ELEMENTAL MG) 400 MG: 400 (241.3 MG) TAB at 18:43

## 2019-01-01 RX ADMIN — SUCRALFATE 1 G: 1 TABLET ORAL at 16:44

## 2019-01-01 RX ADMIN — BIVALIRUDIN 0.22 MG/KG/HR: 250 INJECTION, POWDER, LYOPHILIZED, FOR SOLUTION INTRAVENOUS at 15:17

## 2019-01-01 RX ADMIN — SUCRALFATE 1 G: 1 TABLET ORAL at 21:58

## 2019-01-01 RX ADMIN — POTASSIUM CHLORIDE 20 MEQ: 750 TABLET, FILM COATED, EXTENDED RELEASE ORAL at 17:51

## 2019-01-01 RX ADMIN — PROPOFOL 60 MG: 10 INJECTION, EMULSION INTRAVENOUS at 13:07

## 2019-01-01 RX ADMIN — SUCRALFATE 1 G: 1 TABLET ORAL at 07:04

## 2019-01-01 RX ADMIN — CEFAZOLIN 1 G: 1 INJECTION, POWDER, FOR SOLUTION INTRAMUSCULAR; INTRAVENOUS at 12:02

## 2019-01-01 RX ADMIN — POTASSIUM CHLORIDE 20 MEQ: 750 TABLET, FILM COATED, EXTENDED RELEASE ORAL at 08:43

## 2019-01-01 RX ADMIN — LEVETIRACETAM 125 MG: 250 TABLET ORAL at 18:43

## 2019-01-01 RX ADMIN — BUMETANIDE 2 MG: 0.25 INJECTION INTRAMUSCULAR; INTRAVENOUS at 07:25

## 2019-01-01 RX ADMIN — Medication 10 ML: at 14:33

## 2019-01-01 RX ADMIN — Medication 10 ML: at 21:17

## 2019-01-01 RX ADMIN — SALINE NASAL SPRAY 2 SPRAY: 1.5 SOLUTION NASAL at 09:00

## 2019-01-01 RX ADMIN — POTASSIUM CHLORIDE 20 MEQ: 750 TABLET, FILM COATED, EXTENDED RELEASE ORAL at 20:59

## 2019-01-01 RX ADMIN — POTASSIUM CHLORIDE 20 MEQ: 29.8 INJECTION, SOLUTION INTRAVENOUS at 05:57

## 2019-01-01 RX ADMIN — MILRINONE LACTATE IN DEXTROSE 0.3 MCG/KG/MIN: 200 INJECTION, SOLUTION INTRAVENOUS at 07:29

## 2019-01-01 RX ADMIN — DRONABINOL 2.5 MG: 2.5 CAPSULE ORAL at 06:50

## 2019-01-01 RX ADMIN — POTASSIUM CHLORIDE 60 MEQ: 750 TABLET, FILM COATED, EXTENDED RELEASE ORAL at 21:48

## 2019-01-01 RX ADMIN — SUCRALFATE 1 G: 1 TABLET ORAL at 21:22

## 2019-01-01 RX ADMIN — Medication 10 ML: at 07:35

## 2019-01-01 RX ADMIN — CEFEPIME 2 G: 2 INJECTION, POWDER, FOR SOLUTION INTRAVENOUS at 03:26

## 2019-01-01 RX ADMIN — PHYTONADIONE 10 MG: 10 INJECTION, EMULSION INTRAMUSCULAR; INTRAVENOUS; SUBCUTANEOUS at 10:35

## 2019-01-01 RX ADMIN — Medication 10 ML: at 15:10

## 2019-01-01 RX ADMIN — BISACODYL 10 MG: 10 SUPPOSITORY RECTAL at 16:10

## 2019-01-01 RX ADMIN — Medication 1 CAPSULE: at 10:38

## 2019-01-01 RX ADMIN — ALLOPURINOL 100 MG: 100 TABLET ORAL at 09:24

## 2019-01-01 RX ADMIN — SALINE NASAL SPRAY 2 SPRAY: 1.5 SOLUTION NASAL at 21:42

## 2019-01-01 RX ADMIN — MUPIROCIN: 20 OINTMENT TOPICAL at 18:06

## 2019-01-01 RX ADMIN — SUCRALFATE 1 G: 1 TABLET ORAL at 11:54

## 2019-01-01 RX ADMIN — LEVETIRACETAM 125 MG: 250 TABLET ORAL at 17:50

## 2019-01-01 RX ADMIN — BUMETANIDE 2 MG: 0.25 INJECTION INTRAMUSCULAR; INTRAVENOUS at 09:19

## 2019-01-01 RX ADMIN — ACETAMINOPHEN 1000 MG: 10 INJECTION, SOLUTION INTRAVENOUS at 03:55

## 2019-01-01 RX ADMIN — CASTOR OIL AND BALSAM, PERU: 788; 87 OINTMENT TOPICAL at 17:51

## 2019-01-01 RX ADMIN — DOCUSATE SODIUM AND SENNOSIDES 1 TABLET: 50; 8.6 TABLET, FILM COATED ORAL at 09:19

## 2019-01-01 RX ADMIN — PANTOPRAZOLE SODIUM 40 MG: 40 TABLET, DELAYED RELEASE ORAL at 06:11

## 2019-01-01 RX ADMIN — MAGNESIUM SULFATE HEPTAHYDRATE 1 G: 1 INJECTION, SOLUTION INTRAVENOUS at 06:09

## 2019-01-01 RX ADMIN — ARFORMOTEROL TARTRATE 15 MCG: 15 SOLUTION RESPIRATORY (INHALATION) at 21:11

## 2019-01-01 RX ADMIN — SPIRONOLACTONE 25 MG: 25 TABLET ORAL at 09:18

## 2019-01-01 RX ADMIN — SUCRALFATE 1 G: 1 TABLET ORAL at 12:09

## 2019-01-01 RX ADMIN — OXYMETAZOLINE HYDROCHLORIDE 2 SPRAY: 0.05 SPRAY NASAL at 08:41

## 2019-01-01 RX ADMIN — EPOETIN ALFA-EPBX 10000 UNITS: 10000 INJECTION, SOLUTION INTRAVENOUS; SUBCUTANEOUS at 23:20

## 2019-01-01 RX ADMIN — CEFAZOLIN 1 G: 1 INJECTION, POWDER, FOR SOLUTION INTRAMUSCULAR; INTRAVENOUS; PARENTERAL at 00:11

## 2019-01-01 RX ADMIN — Medication 10 ML: at 06:39

## 2019-01-01 RX ADMIN — SODIUM CHLORIDE 10 ML/HR: 450 INJECTION, SOLUTION INTRAVENOUS at 18:50

## 2019-01-01 RX ADMIN — BUMETANIDE 2 MG: 0.25 INJECTION INTRAMUSCULAR; INTRAVENOUS at 17:54

## 2019-01-01 RX ADMIN — SUCRALFATE 1 G: 1 TABLET ORAL at 22:16

## 2019-01-01 RX ADMIN — Medication 10 ML: at 13:55

## 2019-01-01 RX ADMIN — POLYETHYLENE GLYCOL 3350 17 G: 17 POWDER, FOR SOLUTION ORAL at 08:50

## 2019-01-01 RX ADMIN — OCTREOTIDE ACETATE 25 MCG: 50 INJECTION, SOLUTION INTRAVENOUS; SUBCUTANEOUS at 10:48

## 2019-01-01 RX ADMIN — Medication 2 G: at 00:05

## 2019-01-01 RX ADMIN — SUCRALFATE 1 G: 1 TABLET ORAL at 18:19

## 2019-01-01 RX ADMIN — SUCRALFATE 1 G: 1 TABLET ORAL at 22:24

## 2019-01-01 RX ADMIN — CHLORHEXIDINE GLUCONATE 10 ML: 1.2 RINSE ORAL at 09:23

## 2019-01-01 RX ADMIN — PANTOPRAZOLE SODIUM 40 MG: 40 TABLET, DELAYED RELEASE ORAL at 08:38

## 2019-01-01 RX ADMIN — SODIUM CHLORIDE 9 ML/HR: 900 INJECTION, SOLUTION INTRAVENOUS at 13:20

## 2019-01-01 RX ADMIN — CASTOR OIL AND BALSAM, PERU: 788; 87 OINTMENT TOPICAL at 22:13

## 2019-01-01 RX ADMIN — DRONABINOL 2.5 MG: 2.5 CAPSULE ORAL at 15:18

## 2019-01-01 RX ADMIN — LEVETIRACETAM 250 MG: 100 SOLUTION ORAL at 07:19

## 2019-01-01 RX ADMIN — PANTOPRAZOLE SODIUM 40 MG: 40 TABLET, DELAYED RELEASE ORAL at 08:43

## 2019-01-01 RX ADMIN — DEXTROSE MONOHYDRATE 4.9 ML/HR: 5 INJECTION, SOLUTION INTRAVENOUS at 09:37

## 2019-01-01 RX ADMIN — HYDRALAZINE HYDROCHLORIDE 10 MG: 10 TABLET, FILM COATED ORAL at 17:16

## 2019-01-01 RX ADMIN — MILRINONE LACTATE IN DEXTROSE 0.12 MCG/KG/MIN: 200 INJECTION, SOLUTION INTRAVENOUS at 09:55

## 2019-01-01 RX ADMIN — DRONABINOL 2.5 MG: 2.5 CAPSULE ORAL at 17:32

## 2019-01-01 RX ADMIN — MORPHINE SULFATE 4 MG: 4 INJECTION, SOLUTION INTRAMUSCULAR; INTRAVENOUS at 00:37

## 2019-01-01 RX ADMIN — SILDENAFIL 40 MG: 20 TABLET ORAL at 10:38

## 2019-01-01 RX ADMIN — ACETAMINOPHEN 1000 MG: 10 INJECTION, SOLUTION INTRAVENOUS at 22:34

## 2019-01-01 RX ADMIN — DOCUSATE SODIUM AND SENNOSIDES 1 TABLET: 50; 8.6 TABLET, FILM COATED ORAL at 18:14

## 2019-01-01 RX ADMIN — ALBUMIN (HUMAN) 12.5 G: 0.25 INJECTION, SOLUTION INTRAVENOUS at 15:30

## 2019-01-01 RX ADMIN — HYDRALAZINE HYDROCHLORIDE 10 MG: 10 TABLET, FILM COATED ORAL at 08:03

## 2019-01-01 RX ADMIN — BUMETANIDE 1 MG/HR: 0.25 INJECTION INTRAMUSCULAR; INTRAVENOUS at 01:16

## 2019-01-01 RX ADMIN — Medication 10 ML: at 15:19

## 2019-01-01 RX ADMIN — MAGNESIUM OXIDE TAB 400 MG (241.3 MG ELEMENTAL MG) 400 MG: 400 (241.3 MG) TAB at 09:18

## 2019-01-01 RX ADMIN — Medication 10 ML: at 06:37

## 2019-01-01 RX ADMIN — ALTEPLASE 1 MG: 2.2 INJECTION, POWDER, LYOPHILIZED, FOR SOLUTION INTRAVENOUS at 13:44

## 2019-01-01 RX ADMIN — WARFARIN SODIUM 4 MG: 1 TABLET ORAL at 17:42

## 2019-01-01 RX ADMIN — BUDESONIDE 500 MCG: 0.5 INHALANT RESPIRATORY (INHALATION) at 07:31

## 2019-01-01 RX ADMIN — SUCRALFATE 1 G: 1 TABLET ORAL at 07:14

## 2019-01-01 RX ADMIN — BUMETANIDE 1 MG: 0.25 INJECTION INTRAMUSCULAR; INTRAVENOUS at 18:05

## 2019-01-01 RX ADMIN — ALTEPLASE 1 MG: 2.2 INJECTION, POWDER, LYOPHILIZED, FOR SOLUTION INTRAVENOUS at 14:26

## 2019-01-01 RX ADMIN — VECURONIUM BROMIDE FOR INJECTION 20 MG: 1 INJECTION, POWDER, LYOPHILIZED, FOR SOLUTION INTRAVENOUS at 08:01

## 2019-01-01 RX ADMIN — VASOPRESSIN 2 UNITS: 20 INJECTION INTRAVENOUS at 11:06

## 2019-01-01 RX ADMIN — CHLORHEXIDINE GLUCONATE 10 ML: 1.2 RINSE ORAL at 09:40

## 2019-01-01 RX ADMIN — BUMETANIDE 1 MG: 0.25 INJECTION INTRAMUSCULAR; INTRAVENOUS at 08:12

## 2019-01-01 RX ADMIN — CEFAZOLIN 1 G: 1 INJECTION, POWDER, FOR SOLUTION INTRAMUSCULAR; INTRAVENOUS; PARENTERAL at 16:03

## 2019-01-01 RX ADMIN — Medication 10 ML: at 14:10

## 2019-01-01 RX ADMIN — ALBUMIN (HUMAN) 12.5 G: 0.25 INJECTION, SOLUTION INTRAVENOUS at 20:42

## 2019-01-01 RX ADMIN — SILDENAFIL CITRATE 10 MG: 20 TABLET ORAL at 09:00

## 2019-01-01 RX ADMIN — POTASSIUM CHLORIDE 40 MEQ: 750 TABLET, FILM COATED, EXTENDED RELEASE ORAL at 09:16

## 2019-01-01 RX ADMIN — CEFAZOLIN 1 G: 1 INJECTION, POWDER, FOR SOLUTION INTRAMUSCULAR; INTRAVENOUS; PARENTERAL at 08:18

## 2019-01-01 RX ADMIN — IPRATROPIUM BROMIDE AND ALBUTEROL SULFATE 3 ML: .5; 3 SOLUTION RESPIRATORY (INHALATION) at 03:08

## 2019-01-01 RX ADMIN — Medication 10 ML: at 07:31

## 2019-01-01 RX ADMIN — CASTOR OIL AND BALSAM, PERU: 788; 87 OINTMENT TOPICAL at 22:07

## 2019-01-01 RX ADMIN — PANTOPRAZOLE SODIUM 40 MG: 40 TABLET, DELAYED RELEASE ORAL at 07:58

## 2019-01-01 RX ADMIN — BUDESONIDE 500 MCG: 0.5 INHALANT RESPIRATORY (INHALATION) at 19:23

## 2019-01-01 RX ADMIN — CASTOR OIL AND BALSAM, PERU: 788; 87 OINTMENT TOPICAL at 21:39

## 2019-01-01 RX ADMIN — AMIODARONE HYDROCHLORIDE 100 MG: 200 TABLET ORAL at 08:47

## 2019-01-01 RX ADMIN — SUCRALFATE 1 G: 1 TABLET ORAL at 20:35

## 2019-01-01 RX ADMIN — MILRINONE LACTATE IN DEXTROSE 0.38 MCG/KG/MIN: 200 INJECTION, SOLUTION INTRAVENOUS at 22:13

## 2019-01-01 RX ADMIN — ASPIRIN 81 MG: 81 TABLET, COATED ORAL at 08:29

## 2019-01-01 RX ADMIN — EPOETIN ALFA-EPBX 20000 UNITS: 10000 INJECTION, SOLUTION INTRAVENOUS; SUBCUTANEOUS at 07:21

## 2019-01-01 RX ADMIN — BUMETANIDE 2 MG: 0.25 INJECTION INTRAMUSCULAR; INTRAVENOUS at 16:58

## 2019-01-01 RX ADMIN — Medication 10 ML: at 14:19

## 2019-01-01 RX ADMIN — LIDOCAINE HYDROCHLORIDE 50 MG: 20 INJECTION, SOLUTION INFILTRATION; PERINEURAL at 13:02

## 2019-01-01 RX ADMIN — SUCRALFATE 1 G: 1 TABLET ORAL at 07:58

## 2019-01-01 RX ADMIN — SILDENAFIL 40 MG: 20 TABLET ORAL at 17:20

## 2019-01-01 RX ADMIN — SILDENAFIL CITRATE 20 MG: 20 TABLET ORAL at 16:58

## 2019-01-01 RX ADMIN — INSULIN LISPRO 2 UNITS: 100 INJECTION, SOLUTION INTRAVENOUS; SUBCUTANEOUS at 17:32

## 2019-01-01 RX ADMIN — DRONABINOL 5 MG: 2.5 CAPSULE ORAL at 17:09

## 2019-01-01 RX ADMIN — LEVOFLOXACIN 500 MG: 5 INJECTION, SOLUTION INTRAVENOUS at 04:51

## 2019-01-01 RX ADMIN — SUCRALFATE 1 G: 1 TABLET ORAL at 21:37

## 2019-01-01 RX ADMIN — SODIUM CHLORIDE 40 MG: 9 INJECTION INTRAMUSCULAR; INTRAVENOUS; SUBCUTANEOUS at 21:30

## 2019-01-01 RX ADMIN — MAGNESIUM OXIDE TAB 400 MG (241.3 MG ELEMENTAL MG) 400 MG: 400 (241.3 MG) TAB at 08:13

## 2019-01-01 RX ADMIN — OCTREOTIDE ACETATE 25 MCG: 50 INJECTION, SOLUTION INTRAVENOUS; SUBCUTANEOUS at 15:51

## 2019-01-01 RX ADMIN — POTASSIUM CHLORIDE 60 MEQ: 750 TABLET, FILM COATED, EXTENDED RELEASE ORAL at 21:38

## 2019-01-01 RX ADMIN — VASOPRESSIN 0.04 UNITS/MIN: 20 INJECTION INTRAVENOUS at 11:05

## 2019-01-01 RX ADMIN — CASTOR OIL AND BALSAM, PERU: 788; 87 OINTMENT TOPICAL at 23:15

## 2019-01-01 RX ADMIN — MAGNESIUM OXIDE TAB 400 MG (241.3 MG ELEMENTAL MG) 400 MG: 400 (241.3 MG) TAB at 08:08

## 2019-01-01 RX ADMIN — MAGNESIUM OXIDE TAB 400 MG (241.3 MG ELEMENTAL MG) 400 MG: 400 (241.3 MG) TAB at 09:57

## 2019-01-01 RX ADMIN — Medication 10 ML: at 21:12

## 2019-01-01 RX ADMIN — ALBUMIN (HUMAN) 25 G: 12.5 INJECTION, SOLUTION INTRAVENOUS at 19:10

## 2019-01-01 RX ADMIN — DIGOXIN 0.06 MG: 125 TABLET ORAL at 22:10

## 2019-01-01 RX ADMIN — Medication 10 ML: at 07:22

## 2019-01-01 RX ADMIN — CASTOR OIL AND BALSAM, PERU: 788; 87 OINTMENT TOPICAL at 09:34

## 2019-01-01 RX ADMIN — BIVALIRUDIN 18.2 ML/HR: 250 INJECTION, POWDER, LYOPHILIZED, FOR SOLUTION INTRAVENOUS at 16:47

## 2019-01-01 RX ADMIN — DRONABINOL 2.5 MG: 2.5 CAPSULE ORAL at 07:10

## 2019-01-01 RX ADMIN — MILRINONE LACTATE IN DEXTROSE 0.12 MCG/KG/MIN: 200 INJECTION, SOLUTION INTRAVENOUS at 17:22

## 2019-01-01 RX ADMIN — CASTOR OIL AND BALSAM, PERU: 788; 87 OINTMENT TOPICAL at 09:00

## 2019-01-01 RX ADMIN — CLONAZEPAM 0.5 MG: 0.5 TABLET ORAL at 13:28

## 2019-01-01 RX ADMIN — BUMETANIDE 1 MG: 1 TABLET ORAL at 17:49

## 2019-01-01 RX ADMIN — OCTREOTIDE ACETATE 25 MCG: 50 INJECTION, SOLUTION INTRAVENOUS; SUBCUTANEOUS at 10:55

## 2019-01-01 RX ADMIN — POLYETHYLENE GLYCOL 3350 17 G: 17 POWDER, FOR SOLUTION ORAL at 15:33

## 2019-01-01 RX ADMIN — CASTOR OIL AND BALSAM, PERU: 788; 87 OINTMENT TOPICAL at 08:43

## 2019-01-01 RX ADMIN — MILRINONE LACTATE IN DEXTROSE 0.38 MCG/KG/MIN: 200 INJECTION, SOLUTION INTRAVENOUS at 08:20

## 2019-01-01 RX ADMIN — PROPOFOL 20 MG: 10 INJECTION, EMULSION INTRAVENOUS at 13:09

## 2019-01-01 RX ADMIN — CEFEPIME 2 G: 2 INJECTION, POWDER, FOR SOLUTION INTRAVENOUS at 11:15

## 2019-01-01 RX ADMIN — ALTEPLASE 1 MG: 2.2 INJECTION, POWDER, LYOPHILIZED, FOR SOLUTION INTRAVENOUS at 02:33

## 2019-01-01 RX ADMIN — MAGNESIUM OXIDE TAB 400 MG (241.3 MG ELEMENTAL MG) 400 MG: 400 (241.3 MG) TAB at 08:10

## 2019-01-01 RX ADMIN — MAGNESIUM OXIDE TAB 400 MG (241.3 MG ELEMENTAL MG) 400 MG: 400 (241.3 MG) TAB at 08:45

## 2019-01-01 RX ADMIN — SALINE NASAL SPRAY 2 SPRAY: 1.5 SOLUTION NASAL at 18:37

## 2019-01-01 RX ADMIN — SUCRALFATE 1 G: 1 TABLET ORAL at 13:22

## 2019-01-01 RX ADMIN — ASPIRIN 81 MG 81 MG: 81 TABLET ORAL at 08:23

## 2019-01-01 RX ADMIN — PHYTONADIONE 2.5 MG: 10 INJECTION, EMULSION INTRAMUSCULAR; INTRAVENOUS; SUBCUTANEOUS at 16:44

## 2019-01-01 RX ADMIN — PIPERACILLIN AND TAZOBACTAM 3.38 G: 3; .375 INJECTION, POWDER, LYOPHILIZED, FOR SOLUTION INTRAVENOUS at 12:45

## 2019-01-01 RX ADMIN — ARFORMOTEROL TARTRATE 15 MCG: 15 SOLUTION RESPIRATORY (INHALATION) at 19:09

## 2019-01-01 RX ADMIN — Medication 40 ML: at 08:02

## 2019-01-01 RX ADMIN — Medication 1 CAPSULE: at 08:54

## 2019-01-01 RX ADMIN — SILDENAFIL CITRATE 20 MG: 20 TABLET ORAL at 09:50

## 2019-01-01 RX ADMIN — SUCRALFATE 1 G: 1 TABLET ORAL at 21:11

## 2019-01-01 RX ADMIN — Medication 1 CAPSULE: at 08:42

## 2019-01-01 RX ADMIN — DIGOXIN 0.06 MG: 125 TABLET ORAL at 21:04

## 2019-01-01 RX ADMIN — ONDANSETRON 4 MG: 2 INJECTION INTRAMUSCULAR; INTRAVENOUS at 19:57

## 2019-01-01 RX ADMIN — Medication 10 ML: at 06:18

## 2019-01-01 RX ADMIN — ALLOPURINOL 100 MG: 100 TABLET ORAL at 08:48

## 2019-01-01 RX ADMIN — MUPIROCIN: 20 OINTMENT TOPICAL at 09:23

## 2019-01-01 RX ADMIN — ONDANSETRON 4 MG: 2 INJECTION INTRAMUSCULAR; INTRAVENOUS at 06:43

## 2019-01-01 RX ADMIN — ETOMIDATE 8 MG: 2 INJECTION INTRAVENOUS at 11:41

## 2019-01-01 RX ADMIN — CHLORHEXIDINE GLUCONATE 10 ML: 1.2 RINSE ORAL at 10:34

## 2019-01-01 RX ADMIN — SILDENAFIL CITRATE 20 MG: 20 TABLET ORAL at 17:00

## 2019-01-01 RX ADMIN — ASPIRIN 81 MG 81 MG: 81 TABLET ORAL at 08:50

## 2019-01-01 RX ADMIN — SILDENAFIL CITRATE 20 MG: 20 TABLET, FILM COATED ORAL at 19:15

## 2019-01-01 RX ADMIN — LEVETIRACETAM 125 MG: 250 TABLET ORAL at 17:03

## 2019-01-01 RX ADMIN — IPRATROPIUM BROMIDE AND ALBUTEROL SULFATE 3 ML: .5; 3 SOLUTION RESPIRATORY (INHALATION) at 23:08

## 2019-01-01 RX ADMIN — Medication 1 CAPSULE: at 09:13

## 2019-01-01 RX ADMIN — CASTOR OIL AND BALSAM, PERU: 788; 87 OINTMENT TOPICAL at 22:19

## 2019-01-01 RX ADMIN — DOCUSATE SODIUM AND SENNOSIDES 1 TABLET: 50; 8.6 TABLET, FILM COATED ORAL at 09:51

## 2019-01-01 RX ADMIN — WARFARIN SODIUM 2 MG: 1 TABLET ORAL at 18:04

## 2019-01-01 RX ADMIN — OXYCODONE HYDROCHLORIDE 10 MG: 5 TABLET ORAL at 19:12

## 2019-01-01 RX ADMIN — TAMSULOSIN HYDROCHLORIDE 0.8 MG: 0.4 CAPSULE ORAL at 08:09

## 2019-01-01 RX ADMIN — Medication 2 G: at 08:45

## 2019-01-01 RX ADMIN — WARFARIN SODIUM 2 MG: 2 TABLET ORAL at 17:09

## 2019-01-01 RX ADMIN — SILDENAFIL CITRATE 20 MG: 20 TABLET ORAL at 16:30

## 2019-01-01 RX ADMIN — ALLOPURINOL 100 MG: 100 TABLET ORAL at 08:09

## 2019-01-01 RX ADMIN — SODIUM CHLORIDE 10 ML/HR: 900 INJECTION, SOLUTION INTRAVENOUS at 04:45

## 2019-01-01 RX ADMIN — SODIUM CHLORIDE 500 ML: 900 INJECTION, SOLUTION INTRAVENOUS at 19:12

## 2019-01-01 RX ADMIN — MAGNESIUM OXIDE TAB 400 MG (241.3 MG ELEMENTAL MG) 400 MG: 400 (241.3 MG) TAB at 08:54

## 2019-01-01 RX ADMIN — POTASSIUM CHLORIDE 60 MEQ: 750 TABLET, FILM COATED, EXTENDED RELEASE ORAL at 21:30

## 2019-01-01 RX ADMIN — PIPERACILLIN AND TAZOBACTAM 3.38 G: 3; .375 INJECTION, POWDER, FOR SOLUTION INTRAVENOUS at 20:20

## 2019-01-01 RX ADMIN — MAGNESIUM OXIDE TAB 400 MG (241.3 MG ELEMENTAL MG) 400 MG: 400 (241.3 MG) TAB at 18:37

## 2019-01-01 RX ADMIN — Medication 10 ML: at 13:28

## 2019-01-01 RX ADMIN — SODIUM CHLORIDE 250 ML: 900 INJECTION, SOLUTION INTRAVENOUS at 11:35

## 2019-01-01 RX ADMIN — ALBUMIN (HUMAN) 12.5 G: 0.25 INJECTION, SOLUTION INTRAVENOUS at 17:00

## 2019-01-01 RX ADMIN — IPRATROPIUM BROMIDE AND ALBUTEROL SULFATE 3 ML: .5; 3 SOLUTION RESPIRATORY (INHALATION) at 08:19

## 2019-01-01 RX ADMIN — POTASSIUM CHLORIDE 40 MEQ: 750 TABLET, FILM COATED, EXTENDED RELEASE ORAL at 16:53

## 2019-01-01 RX ADMIN — PIPERACILLIN SODIUM AND TAZOBACTAM SODIUM 3.38 G: 3; .375 INJECTION, POWDER, LYOPHILIZED, FOR SOLUTION INTRAVENOUS at 08:48

## 2019-01-01 RX ADMIN — BUDESONIDE 500 MCG: 0.5 INHALANT RESPIRATORY (INHALATION) at 19:14

## 2019-01-01 RX ADMIN — POLYETHYLENE GLYCOL 3350 17 G: 17 POWDER, FOR SOLUTION ORAL at 09:00

## 2019-01-01 RX ADMIN — POTASSIUM CHLORIDE 60 MEQ: 750 TABLET, FILM COATED, EXTENDED RELEASE ORAL at 17:00

## 2019-01-01 RX ADMIN — BUDESONIDE 500 MCG: 0.5 INHALANT RESPIRATORY (INHALATION) at 20:11

## 2019-01-01 RX ADMIN — DEXMEDETOMIDINE HYDROCHLORIDE 1 MCG/KG/HR: 100 INJECTION, SOLUTION, CONCENTRATE INTRAVENOUS at 15:43

## 2019-01-01 RX ADMIN — PIPERACILLIN AND TAZOBACTAM 3.38 G: 3; .375 INJECTION, POWDER, FOR SOLUTION INTRAVENOUS at 15:49

## 2019-01-01 RX ADMIN — POTASSIUM CHLORIDE 20 MEQ: 400 INJECTION, SOLUTION INTRAVENOUS at 09:59

## 2019-01-01 RX ADMIN — POTASSIUM CHLORIDE 40 MEQ: 750 TABLET, FILM COATED, EXTENDED RELEASE ORAL at 09:13

## 2019-01-01 RX ADMIN — TAMSULOSIN HYDROCHLORIDE 0.4 MG: 0.4 CAPSULE ORAL at 10:38

## 2019-01-01 RX ADMIN — OCTREOTIDE ACETATE 25 MCG: 50 INJECTION, SOLUTION INTRAVENOUS; SUBCUTANEOUS at 22:17

## 2019-01-01 RX ADMIN — POTASSIUM CHLORIDE 20 MEQ: 400 INJECTION, SOLUTION INTRAVENOUS at 08:10

## 2019-01-01 RX ADMIN — ACETAMINOPHEN 650 MG: 325 TABLET, FILM COATED ORAL at 03:28

## 2019-01-01 RX ADMIN — CASTOR OIL AND BALSAM, PERU: 788; 87 OINTMENT TOPICAL at 15:27

## 2019-01-01 RX ADMIN — EPOETIN ALFA-EPBX 10000 UNITS: 10000 INJECTION, SOLUTION INTRAVENOUS; SUBCUTANEOUS at 21:20

## 2019-01-01 RX ADMIN — OXYCODONE HYDROCHLORIDE 10 MG: 5 TABLET ORAL at 21:10

## 2019-01-01 RX ADMIN — SPIRONOLACTONE 25 MG: 25 TABLET ORAL at 09:24

## 2019-01-01 RX ADMIN — POTASSIUM CHLORIDE 40 MEQ: 750 TABLET, FILM COATED, EXTENDED RELEASE ORAL at 08:57

## 2019-01-01 RX ADMIN — POTASSIUM CHLORIDE 20 MEQ: 400 INJECTION, SOLUTION INTRAVENOUS at 05:25

## 2019-01-01 RX ADMIN — ARFORMOTEROL TARTRATE 15 MCG: 15 SOLUTION RESPIRATORY (INHALATION) at 11:28

## 2019-01-01 RX ADMIN — CLONAZEPAM 0.5 MG: 0.5 TABLET ORAL at 07:00

## 2019-01-01 RX ADMIN — Medication 10 ML: at 07:00

## 2019-01-01 RX ADMIN — HEPARIN SODIUM 4000 UNITS: 5000 INJECTION INTRAVENOUS; SUBCUTANEOUS at 00:56

## 2019-01-01 RX ADMIN — POTASSIUM CHLORIDE 60 MEQ: 750 TABLET, FILM COATED, EXTENDED RELEASE ORAL at 17:32

## 2019-01-01 RX ADMIN — DRONABINOL 2.5 MG: 2.5 CAPSULE ORAL at 17:47

## 2019-01-01 RX ADMIN — MILRINONE LACTATE IN DEXTROSE 0.25 MCG/KG/MIN: 200 INJECTION, SOLUTION INTRAVENOUS at 05:11

## 2019-01-01 RX ADMIN — SILDENAFIL 40 MG: 20 TABLET ORAL at 08:27

## 2019-01-01 RX ADMIN — ALBUMIN (HUMAN) 12.5 G: 0.25 INJECTION, SOLUTION INTRAVENOUS at 09:41

## 2019-01-01 RX ADMIN — OXYMETAZOLINE HYDROCHLORIDE 2 SPRAY: 0.05 SPRAY NASAL at 17:30

## 2019-01-01 RX ADMIN — LEVETIRACETAM 250 MG: 100 SOLUTION ORAL at 06:38

## 2019-01-01 RX ADMIN — MAGNESIUM SULFATE HEPTAHYDRATE 1 G: 1 INJECTION, SOLUTION INTRAVENOUS at 06:47

## 2019-01-01 RX ADMIN — PANTOPRAZOLE SODIUM 40 MG: 40 TABLET, DELAYED RELEASE ORAL at 07:17

## 2019-01-01 RX ADMIN — Medication 2 G: at 03:21

## 2019-01-01 RX ADMIN — OXYCODONE 5 MG: 5 TABLET ORAL at 09:05

## 2019-01-01 RX ADMIN — OXYMETAZOLINE HYDROCHLORIDE 2 SPRAY: 0.05 SPRAY NASAL at 17:06

## 2019-01-01 RX ADMIN — DRONABINOL 2.5 MG: 2.5 CAPSULE ORAL at 17:27

## 2019-01-01 RX ADMIN — SILDENAFIL CITRATE 20 MG: 20 TABLET ORAL at 17:09

## 2019-01-01 RX ADMIN — MILRINONE LACTATE IN DEXTROSE 0.2 MCG/KG/MIN: 200 INJECTION, SOLUTION INTRAVENOUS at 11:09

## 2019-01-01 RX ADMIN — ALBUMIN (HUMAN) 12.5 G: 0.25 INJECTION, SOLUTION INTRAVENOUS at 08:23

## 2019-01-01 RX ADMIN — MAGNESIUM OXIDE TAB 400 MG (241.3 MG ELEMENTAL MG) 400 MG: 400 (241.3 MG) TAB at 17:17

## 2019-01-01 RX ADMIN — Medication 10 ML: at 21:31

## 2019-01-01 RX ADMIN — BUMETANIDE 1 MG: 0.25 INJECTION INTRAMUSCULAR; INTRAVENOUS at 09:23

## 2019-01-01 RX ADMIN — MILRINONE LACTATE IN DEXTROSE 0.38 MCG/KG/MIN: 200 INJECTION, SOLUTION INTRAVENOUS at 11:00

## 2019-01-01 RX ADMIN — TAMSULOSIN HYDROCHLORIDE 0.4 MG: 0.4 CAPSULE ORAL at 08:54

## 2019-01-01 RX ADMIN — STANDARDIZED SENNA CONCENTRATE AND DOCUSATE SODIUM 1 TABLET: 8.6; 5 TABLET ORAL at 08:42

## 2019-01-01 RX ADMIN — PIPERACILLIN SODIUM AND TAZOBACTAM SODIUM 3.38 G: 3; .375 INJECTION, POWDER, LYOPHILIZED, FOR SOLUTION INTRAVENOUS at 09:06

## 2019-01-01 RX ADMIN — IPRATROPIUM BROMIDE AND ALBUTEROL SULFATE 3 ML: .5; 3 SOLUTION RESPIRATORY (INHALATION) at 03:53

## 2019-01-01 RX ADMIN — OCTREOTIDE ACETATE 25 MCG: 50 INJECTION, SOLUTION INTRAVENOUS; SUBCUTANEOUS at 11:15

## 2019-01-01 RX ADMIN — OXYCODONE HYDROCHLORIDE 10 MG: 5 TABLET ORAL at 02:02

## 2019-01-01 RX ADMIN — ALLOPURINOL 100 MG: 100 TABLET ORAL at 08:31

## 2019-01-01 RX ADMIN — OXYMETAZOLINE HYDROCHLORIDE 2 SPRAY: 0.05 SPRAY NASAL at 08:45

## 2019-01-01 RX ADMIN — HYDRALAZINE HYDROCHLORIDE 10 MG: 10 TABLET, FILM COATED ORAL at 16:29

## 2019-01-01 RX ADMIN — CASTOR OIL AND BALSAM, PERU: 788; 87 OINTMENT TOPICAL at 16:20

## 2019-01-01 RX ADMIN — SUCRALFATE 1 G: 1 TABLET ORAL at 07:02

## 2019-01-01 RX ADMIN — SILDENAFIL 40 MG: 20 TABLET ORAL at 17:05

## 2019-01-01 RX ADMIN — IPRATROPIUM BROMIDE AND ALBUTEROL SULFATE 3 ML: .5; 3 SOLUTION RESPIRATORY (INHALATION) at 23:51

## 2019-01-01 RX ADMIN — DOBUTAMINE IN DEXTROSE 2.5 MCG/KG/MIN: 200 INJECTION, SOLUTION INTRAVENOUS at 07:29

## 2019-01-01 RX ADMIN — INSULIN LISPRO 3 UNITS: 100 INJECTION, SOLUTION INTRAVENOUS; SUBCUTANEOUS at 11:22

## 2019-01-01 RX ADMIN — SILDENAFIL 40 MG: 20 TABLET ORAL at 22:17

## 2019-01-01 RX ADMIN — PANTOPRAZOLE SODIUM 40 MG: 40 TABLET, DELAYED RELEASE ORAL at 06:30

## 2019-01-01 RX ADMIN — CEFEPIME 2 G: 2 INJECTION, POWDER, FOR SOLUTION INTRAVENOUS at 02:42

## 2019-01-01 RX ADMIN — CEFEPIME HYDROCHLORIDE 2 G: 2 INJECTION, POWDER, FOR SOLUTION INTRAVENOUS at 12:05

## 2019-01-01 RX ADMIN — MIDAZOLAM 1 MG: 1 INJECTION INTRAMUSCULAR; INTRAVENOUS at 23:47

## 2019-01-01 RX ADMIN — POTASSIUM CHLORIDE 40 MEQ: 750 TABLET, FILM COATED, EXTENDED RELEASE ORAL at 09:24

## 2019-01-01 RX ADMIN — POTASSIUM CHLORIDE 20 MEQ: 400 INJECTION, SOLUTION INTRAVENOUS at 06:34

## 2019-01-01 RX ADMIN — IPRATROPIUM BROMIDE AND ALBUTEROL SULFATE 3 ML: .5; 3 SOLUTION RESPIRATORY (INHALATION) at 21:05

## 2019-01-01 RX ADMIN — TAMSULOSIN HYDROCHLORIDE 0.4 MG: 0.4 CAPSULE ORAL at 09:13

## 2019-01-01 RX ADMIN — MAGNESIUM OXIDE TAB 400 MG (241.3 MG ELEMENTAL MG) 400 MG: 400 (241.3 MG) TAB at 09:24

## 2019-01-01 RX ADMIN — Medication 1 CAPSULE: at 09:39

## 2019-01-01 RX ADMIN — CLONAZEPAM 0.5 MG: 0.5 TABLET ORAL at 22:45

## 2019-01-01 RX ADMIN — DIGOXIN 0.06 MG: 125 TABLET ORAL at 23:15

## 2019-01-01 RX ADMIN — DEXTROSE MONOHYDRATE 300 MG: 5 INJECTION, SOLUTION INTRAVENOUS at 09:42

## 2019-01-01 RX ADMIN — DRONABINOL 2.5 MG: 2.5 CAPSULE ORAL at 17:01

## 2019-01-01 RX ADMIN — DOCUSATE SODIUM AND SENNOSIDES 1 TABLET: 50; 8.6 TABLET, FILM COATED ORAL at 17:54

## 2019-01-01 RX ADMIN — TAMSULOSIN HYDROCHLORIDE 0.4 MG: 0.4 CAPSULE ORAL at 08:13

## 2019-01-01 RX ADMIN — Medication 40 ML: at 06:16

## 2019-01-01 RX ADMIN — TAMSULOSIN HYDROCHLORIDE 0.4 MG: 0.4 CAPSULE ORAL at 09:24

## 2019-01-01 RX ADMIN — HYDRALAZINE HYDROCHLORIDE 10 MG: 10 TABLET, FILM COATED ORAL at 18:32

## 2019-01-01 RX ADMIN — SUCRALFATE 1 G: 1 TABLET ORAL at 22:53

## 2019-01-01 RX ADMIN — SUCRALFATE 1 G: 1 TABLET ORAL at 16:28

## 2019-01-01 RX ADMIN — AMIODARONE HYDROCHLORIDE 100 MG: 200 TABLET ORAL at 17:42

## 2019-01-01 RX ADMIN — IRON SUCROSE 300 MG: 20 INJECTION, SOLUTION INTRAVENOUS at 08:16

## 2019-01-01 RX ADMIN — ALBUMIN (HUMAN) 12.5 G: 12.5 INJECTION, SOLUTION INTRAVENOUS at 16:01

## 2019-01-01 RX ADMIN — BUMETANIDE 1 MG: 0.25 INJECTION INTRAMUSCULAR; INTRAVENOUS at 18:01

## 2019-01-01 RX ADMIN — BENZOCAINE AND MENTHOL 1 LOZENGE: 15; 3.6 LOZENGE ORAL at 05:45

## 2019-01-01 RX ADMIN — OXYCODONE HYDROCHLORIDE 10 MG: 5 TABLET ORAL at 10:19

## 2019-01-01 RX ADMIN — Medication 20 ML: at 07:59

## 2019-01-01 RX ADMIN — HYDRALAZINE HYDROCHLORIDE 20 MG: 20 INJECTION INTRAMUSCULAR; INTRAVENOUS at 05:47

## 2019-01-01 RX ADMIN — DRONABINOL 5 MG: 2.5 CAPSULE ORAL at 07:24

## 2019-01-01 RX ADMIN — SUCRALFATE 1 G: 1 TABLET ORAL at 21:49

## 2019-01-01 RX ADMIN — MILRINONE LACTATE IN DEXTROSE 0.38 MCG/KG/MIN: 200 INJECTION, SOLUTION INTRAVENOUS at 14:45

## 2019-01-01 RX ADMIN — CEFEPIME 2 G: 2 INJECTION, POWDER, FOR SOLUTION INTRAVENOUS at 12:40

## 2019-01-01 RX ADMIN — DIGOXIN 0.25 MG: 250 TABLET ORAL at 08:16

## 2019-01-01 RX ADMIN — INSULIN LISPRO 2 UNITS: 100 INJECTION, SOLUTION INTRAVENOUS; SUBCUTANEOUS at 08:51

## 2019-01-01 RX ADMIN — SUCRALFATE 1 G: 1 TABLET ORAL at 17:55

## 2019-01-01 RX ADMIN — Medication 10 ML: at 06:58

## 2019-01-01 RX ADMIN — PIPERACILLIN AND TAZOBACTAM 3.38 G: 3; .375 INJECTION, POWDER, FOR SOLUTION INTRAVENOUS at 08:24

## 2019-01-01 RX ADMIN — INSULIN LISPRO 2 UNITS: 100 INJECTION, SOLUTION INTRAVENOUS; SUBCUTANEOUS at 12:02

## 2019-01-01 RX ADMIN — TAMSULOSIN HYDROCHLORIDE 0.4 MG: 0.4 CAPSULE ORAL at 08:23

## 2019-01-01 RX ADMIN — IPRATROPIUM BROMIDE AND ALBUTEROL SULFATE 3 ML: .5; 3 SOLUTION RESPIRATORY (INHALATION) at 20:13

## 2019-01-01 RX ADMIN — FINASTERIDE 5 MG: 5 TABLET, FILM COATED ORAL at 09:13

## 2019-01-01 RX ADMIN — TAMSULOSIN HYDROCHLORIDE 0.8 MG: 0.4 CAPSULE ORAL at 08:04

## 2019-01-01 RX ADMIN — DRONABINOL 2.5 MG: 2.5 CAPSULE ORAL at 06:54

## 2019-01-01 RX ADMIN — ASPIRIN 81 MG 81 MG: 81 TABLET ORAL at 09:26

## 2019-01-01 RX ADMIN — WARFARIN SODIUM 1 MG: 1 TABLET ORAL at 16:47

## 2019-01-01 RX ADMIN — IPRATROPIUM BROMIDE AND ALBUTEROL SULFATE 3 ML: .5; 3 SOLUTION RESPIRATORY (INHALATION) at 11:50

## 2019-01-01 RX ADMIN — OXYCODONE 5 MG: 5 TABLET ORAL at 08:15

## 2019-01-01 RX ADMIN — SUCRALFATE 1 G: 1 TABLET ORAL at 18:24

## 2019-01-01 RX ADMIN — IRON SUCROSE 200 MG: 20 INJECTION, SOLUTION INTRAVENOUS at 10:47

## 2019-01-01 RX ADMIN — MUPIROCIN: 20 OINTMENT TOPICAL at 09:00

## 2019-01-01 RX ADMIN — Medication 2 G: at 18:59

## 2019-01-01 RX ADMIN — STANDARDIZED SENNA CONCENTRATE AND DOCUSATE SODIUM 1 TABLET: 8.6; 5 TABLET ORAL at 09:00

## 2019-01-01 RX ADMIN — BIVALIRUDIN 0.22 MG/KG/HR: 250 INJECTION, POWDER, LYOPHILIZED, FOR SOLUTION INTRAVENOUS at 22:10

## 2019-01-01 RX ADMIN — SILDENAFIL CITRATE 20 MG: 20 TABLET ORAL at 21:30

## 2019-01-01 RX ADMIN — TAMSULOSIN HYDROCHLORIDE 0.4 MG: 0.4 CAPSULE ORAL at 08:27

## 2019-01-01 RX ADMIN — AMIODARONE HYDROCHLORIDE 200 MG: 200 TABLET ORAL at 09:10

## 2019-01-01 RX ADMIN — ACETAZOLAMIDE 250 MG: 500 INJECTION, POWDER, LYOPHILIZED, FOR SOLUTION INTRAVENOUS at 14:00

## 2019-01-01 RX ADMIN — MAGNESIUM OXIDE TAB 400 MG (241.3 MG ELEMENTAL MG) 400 MG: 400 (241.3 MG) TAB at 09:00

## 2019-01-01 RX ADMIN — OXYCODONE HYDROCHLORIDE 10 MG: 5 TABLET ORAL at 20:05

## 2019-01-01 RX ADMIN — HEPARIN SODIUM AND DEXTROSE 16 UNITS/KG/HR: 10000; 5 INJECTION INTRAVENOUS at 13:04

## 2019-01-01 RX ADMIN — Medication 10 ML: at 21:28

## 2019-01-01 RX ADMIN — Medication 10 ML: at 07:19

## 2019-01-01 RX ADMIN — DEXMEDETOMIDINE HYDROCHLORIDE 1 MCG/KG/HR: 100 INJECTION, SOLUTION, CONCENTRATE INTRAVENOUS at 00:59

## 2019-01-01 RX ADMIN — MORPHINE SULFATE 4 MG: 4 INJECTION, SOLUTION INTRAMUSCULAR; INTRAVENOUS at 17:29

## 2019-01-01 RX ADMIN — TAMSULOSIN HYDROCHLORIDE 0.4 MG: 0.4 CAPSULE ORAL at 08:38

## 2019-01-01 RX ADMIN — PIPERACILLIN AND TAZOBACTAM 3.38 G: 3; .375 INJECTION, POWDER, FOR SOLUTION INTRAVENOUS at 08:04

## 2019-01-01 RX ADMIN — Medication 2 G: at 02:24

## 2019-01-01 RX ADMIN — MILRINONE LACTATE IN DEXTROSE 0.2 MCG/KG/MIN: 200 INJECTION, SOLUTION INTRAVENOUS at 10:34

## 2019-01-01 RX ADMIN — POTASSIUM CHLORIDE 60 MEQ: 750 TABLET, FILM COATED, EXTENDED RELEASE ORAL at 21:09

## 2019-01-01 RX ADMIN — ASPIRIN 81 MG 81 MG: 81 TABLET ORAL at 08:16

## 2019-01-01 RX ADMIN — AMIODARONE HYDROCHLORIDE 100 MG: 200 TABLET ORAL at 09:29

## 2019-01-01 RX ADMIN — ARFORMOTEROL TARTRATE 15 MCG: 15 SOLUTION RESPIRATORY (INHALATION) at 21:24

## 2019-01-01 RX ADMIN — BUDESONIDE 500 MCG: 0.5 INHALANT RESPIRATORY (INHALATION) at 19:47

## 2019-01-01 RX ADMIN — Medication 10 ML: at 21:22

## 2019-01-01 RX ADMIN — IPRATROPIUM BROMIDE AND ALBUTEROL SULFATE 3 ML: .5; 3 SOLUTION RESPIRATORY (INHALATION) at 07:01

## 2019-01-01 RX ADMIN — SALINE NASAL SPRAY 2 SPRAY: 1.5 SOLUTION NASAL at 21:07

## 2019-01-01 RX ADMIN — HYDRALAZINE HYDROCHLORIDE 10 MG: 10 TABLET, FILM COATED ORAL at 08:17

## 2019-01-01 RX ADMIN — IPRATROPIUM BROMIDE AND ALBUTEROL SULFATE 3 ML: .5; 3 SOLUTION RESPIRATORY (INHALATION) at 23:12

## 2019-01-01 RX ADMIN — SUCRALFATE 1 G: 1 TABLET ORAL at 21:41

## 2019-01-01 RX ADMIN — DOCUSATE SODIUM AND SENNOSIDES 1 TABLET: 50; 8.6 TABLET, FILM COATED ORAL at 17:43

## 2019-01-01 RX ADMIN — CASTOR OIL AND BALSAM, PERU: 788; 87 OINTMENT TOPICAL at 08:57

## 2019-01-01 RX ADMIN — CASTOR OIL AND BALSAM, PERU: 788; 87 OINTMENT TOPICAL at 21:06

## 2019-01-01 RX ADMIN — OXYCODONE 5 MG: 5 TABLET ORAL at 20:36

## 2019-01-01 RX ADMIN — AMIODARONE HYDROCHLORIDE 100 MG: 200 TABLET ORAL at 09:57

## 2019-01-01 RX ADMIN — DRONABINOL 2.5 MG: 2.5 CAPSULE ORAL at 17:05

## 2019-01-01 RX ADMIN — SILDENAFIL CITRATE 20 MG: 20 TABLET ORAL at 21:18

## 2019-01-01 RX ADMIN — WARFARIN SODIUM 4 MG: 4 TABLET ORAL at 18:01

## 2019-01-01 RX ADMIN — ARFORMOTEROL TARTRATE 15 MCG: 15 SOLUTION RESPIRATORY (INHALATION) at 19:59

## 2019-01-01 RX ADMIN — ARFORMOTEROL TARTRATE 15 MCG: 15 SOLUTION RESPIRATORY (INHALATION) at 21:06

## 2019-01-01 RX ADMIN — Medication 30 ML: at 21:38

## 2019-01-01 RX ADMIN — DRONABINOL 2.5 MG: 2.5 CAPSULE ORAL at 06:34

## 2019-01-01 RX ADMIN — PANTOPRAZOLE SODIUM 40 MG: 40 TABLET, DELAYED RELEASE ORAL at 09:25

## 2019-01-01 RX ADMIN — MAGNESIUM OXIDE TAB 400 MG (241.3 MG ELEMENTAL MG) 400 MG: 400 (241.3 MG) TAB at 17:20

## 2019-01-01 RX ADMIN — SUCRALFATE 1 G: 1 TABLET ORAL at 12:55

## 2019-01-01 RX ADMIN — ASPIRIN 81 MG 81 MG: 81 TABLET ORAL at 08:42

## 2019-01-01 RX ADMIN — DRONABINOL 2.5 MG: 2.5 CAPSULE ORAL at 18:14

## 2019-01-01 RX ADMIN — POTASSIUM CHLORIDE 20 MEQ: 400 INJECTION, SOLUTION INTRAVENOUS at 09:57

## 2019-01-01 RX ADMIN — POLYETHYLENE GLYCOL 3350 17 G: 17 POWDER, FOR SOLUTION ORAL at 09:51

## 2019-01-01 RX ADMIN — SODIUM CHLORIDE 2.3 UNITS/HR: 9 INJECTION, SOLUTION INTRAVENOUS at 19:59

## 2019-01-01 RX ADMIN — ALBUMIN (HUMAN) 12.5 G: 0.25 INJECTION, SOLUTION INTRAVENOUS at 00:22

## 2019-01-01 RX ADMIN — OCTREOTIDE ACETATE 25 MCG: 50 INJECTION, SOLUTION INTRAVENOUS; SUBCUTANEOUS at 21:40

## 2019-01-01 RX ADMIN — BUMETANIDE 1 MG/HR: 0.25 INJECTION INTRAMUSCULAR; INTRAVENOUS at 21:30

## 2019-01-01 RX ADMIN — TAMSULOSIN HYDROCHLORIDE 0.8 MG: 0.4 CAPSULE ORAL at 08:26

## 2019-01-01 RX ADMIN — TAMSULOSIN HYDROCHLORIDE 0.8 MG: 0.4 CAPSULE ORAL at 09:57

## 2019-01-01 RX ADMIN — MUPIROCIN: 20 OINTMENT TOPICAL at 17:35

## 2019-01-01 RX ADMIN — Medication 10 ML: at 21:18

## 2019-01-01 RX ADMIN — TAMSULOSIN HYDROCHLORIDE 0.8 MG: 0.4 CAPSULE ORAL at 08:48

## 2019-01-01 RX ADMIN — PANTOPRAZOLE SODIUM 40 MG: 40 TABLET, DELAYED RELEASE ORAL at 06:54

## 2019-01-01 RX ADMIN — SILDENAFIL 40 MG: 20 TABLET ORAL at 21:24

## 2019-01-01 RX ADMIN — PHYTONADIONE 10 MG: 10 INJECTION, EMULSION INTRAMUSCULAR; INTRAVENOUS; SUBCUTANEOUS at 09:13

## 2019-01-01 RX ADMIN — ALBUMIN (HUMAN) 12.5 G: 12.5 INJECTION, SOLUTION INTRAVENOUS at 08:43

## 2019-01-01 RX ADMIN — PIPERACILLIN AND TAZOBACTAM 3.38 G: 3; .375 INJECTION, POWDER, FOR SOLUTION INTRAVENOUS at 11:03

## 2019-01-01 RX ADMIN — HEPARIN SODIUM 36000 UNITS: 1000 INJECTION, SOLUTION INTRAVENOUS; SUBCUTANEOUS at 09:58

## 2019-01-01 RX ADMIN — SILDENAFIL CITRATE 20 MG: 20 TABLET, FILM COATED ORAL at 03:40

## 2019-01-01 RX ADMIN — SUCRALFATE 1 G: 1 TABLET ORAL at 11:16

## 2019-01-01 RX ADMIN — IPRATROPIUM BROMIDE AND ALBUTEROL SULFATE 3 ML: .5; 3 SOLUTION RESPIRATORY (INHALATION) at 17:00

## 2019-01-01 RX ADMIN — ACETAMINOPHEN 650 MG: 325 TABLET, FILM COATED ORAL at 09:10

## 2019-01-01 RX ADMIN — MORPHINE SULFATE 4 MG: 4 INJECTION, SOLUTION INTRAMUSCULAR; INTRAVENOUS at 23:11

## 2019-01-01 RX ADMIN — CLONAZEPAM 0.5 MG: 0.5 TABLET ORAL at 23:58

## 2019-01-01 RX ADMIN — HYDRALAZINE HYDROCHLORIDE 10 MG: 10 TABLET, FILM COATED ORAL at 09:31

## 2019-01-01 RX ADMIN — ACETAMINOPHEN 650 MG: 325 TABLET, FILM COATED ORAL at 23:58

## 2019-01-01 RX ADMIN — SILDENAFIL CITRATE 20 MG: 20 TABLET, FILM COATED ORAL at 02:00

## 2019-01-01 RX ADMIN — MAGNESIUM OXIDE TAB 400 MG (241.3 MG ELEMENTAL MG) 400 MG: 400 (241.3 MG) TAB at 08:03

## 2019-01-01 RX ADMIN — Medication 10 ML: at 16:04

## 2019-01-01 RX ADMIN — CASTOR OIL AND BALSAM, PERU: 788; 87 OINTMENT TOPICAL at 16:00

## 2019-01-01 RX ADMIN — POTASSIUM CHLORIDE AND SODIUM CHLORIDE: 450; 150 INJECTION, SOLUTION INTRAVENOUS at 07:56

## 2019-01-01 RX ADMIN — DRONABINOL 2.5 MG: 2.5 CAPSULE ORAL at 16:06

## 2019-01-01 RX ADMIN — ARFORMOTEROL TARTRATE 15 MCG: 15 SOLUTION RESPIRATORY (INHALATION) at 19:23

## 2019-01-01 RX ADMIN — BUDESONIDE 500 MCG: 0.5 INHALANT RESPIRATORY (INHALATION) at 09:47

## 2019-01-01 RX ADMIN — MELATONIN 3 MG: at 20:56

## 2019-01-01 RX ADMIN — OXYCODONE HYDROCHLORIDE 10 MG: 5 TABLET ORAL at 21:37

## 2019-01-01 RX ADMIN — OCTREOTIDE ACETATE 25 MCG: 50 INJECTION, SOLUTION INTRAVENOUS; SUBCUTANEOUS at 17:05

## 2019-01-01 RX ADMIN — CEFEPIME 2 G: 2 INJECTION, POWDER, FOR SOLUTION INTRAVENOUS at 03:09

## 2019-01-01 RX ADMIN — SODIUM CHLORIDE 0.3 MCG/KG/MIN: 9 INJECTION, SOLUTION INTRAVENOUS at 16:51

## 2019-01-01 RX ADMIN — Medication 10 ML: at 16:02

## 2019-01-01 RX ADMIN — INSULIN LISPRO 2 UNITS: 100 INJECTION, SOLUTION INTRAVENOUS; SUBCUTANEOUS at 12:42

## 2019-01-01 RX ADMIN — HYDRALAZINE HYDROCHLORIDE 10 MG: 10 TABLET, FILM COATED ORAL at 21:28

## 2019-01-01 RX ADMIN — PANTOPRAZOLE SODIUM 40 MG: 40 TABLET, DELAYED RELEASE ORAL at 06:34

## 2019-01-01 RX ADMIN — SILDENAFIL 40 MG: 20 TABLET ORAL at 22:31

## 2019-01-01 RX ADMIN — POTASSIUM CHLORIDE 20 MEQ: 400 INJECTION, SOLUTION INTRAVENOUS at 08:48

## 2019-01-01 RX ADMIN — DOCUSATE SODIUM AND SENNOSIDES 1 TABLET: 50; 8.6 TABLET, FILM COATED ORAL at 09:56

## 2019-01-01 RX ADMIN — SODIUM CHLORIDE, POTASSIUM CHLORIDE, SODIUM LACTATE AND CALCIUM CHLORIDE: 600; 310; 30; 20 INJECTION, SOLUTION INTRAVENOUS at 07:30

## 2019-01-01 RX ADMIN — TAMSULOSIN HYDROCHLORIDE 0.8 MG: 0.4 CAPSULE ORAL at 09:18

## 2019-01-01 RX ADMIN — CASTOR OIL AND BALSAM, PERU: 788; 87 OINTMENT TOPICAL at 10:40

## 2019-01-01 RX ADMIN — ONDANSETRON 4 MG: 2 INJECTION INTRAMUSCULAR; INTRAVENOUS at 12:30

## 2019-01-01 RX ADMIN — SILDENAFIL 40 MG: 20 TABLET ORAL at 09:24

## 2019-01-01 RX ADMIN — Medication 2 G: at 02:42

## 2019-01-01 RX ADMIN — ALBUMIN (HUMAN) 12.5 G: 12.5 INJECTION, SOLUTION INTRAVENOUS at 13:55

## 2019-01-01 RX ADMIN — INSULIN LISPRO 2 UNITS: 100 INJECTION, SOLUTION INTRAVENOUS; SUBCUTANEOUS at 17:41

## 2019-01-01 RX ADMIN — Medication 2 G: at 17:27

## 2019-01-01 RX ADMIN — SILDENAFIL CITRATE 20 MG: 20 TABLET ORAL at 18:03

## 2019-01-01 RX ADMIN — BISACODYL 5 MG: 5 TABLET, COATED ORAL at 07:52

## 2019-01-01 RX ADMIN — ALBUTEROL SULFATE 2.5 MG: 2.5 SOLUTION RESPIRATORY (INHALATION) at 19:27

## 2019-01-01 RX ADMIN — PIPERACILLIN SODIUM AND TAZOBACTAM SODIUM 3.38 G: 3; .375 INJECTION, POWDER, LYOPHILIZED, FOR SOLUTION INTRAVENOUS at 01:24

## 2019-01-01 RX ADMIN — SPIRONOLACTONE 25 MG: 25 TABLET ORAL at 08:29

## 2019-01-01 RX ADMIN — CHLORHEXIDINE GLUCONATE 10 ML: 1.2 RINSE ORAL at 18:02

## 2019-01-01 RX ADMIN — SUCRALFATE 1 G: 1 TABLET ORAL at 13:02

## 2019-01-01 RX ADMIN — SILDENAFIL 40 MG: 20 TABLET ORAL at 09:23

## 2019-01-01 RX ADMIN — ALBUMIN (HUMAN) 12.5 G: 0.25 INJECTION, SOLUTION INTRAVENOUS at 17:01

## 2019-01-01 RX ADMIN — SILDENAFIL 40 MG: 20 TABLET ORAL at 21:14

## 2019-01-01 RX ADMIN — SUCRALFATE 1 G: 1 TABLET ORAL at 12:49

## 2019-01-01 RX ADMIN — DRONABINOL 5 MG: 2.5 CAPSULE ORAL at 07:35

## 2019-01-01 RX ADMIN — SODIUM CHLORIDE 9 ML/HR: 900 INJECTION, SOLUTION INTRAVENOUS at 23:24

## 2019-01-01 RX ADMIN — ARFORMOTEROL TARTRATE 15 MCG: 15 SOLUTION RESPIRATORY (INHALATION) at 08:00

## 2019-01-01 RX ADMIN — CEFEPIME HYDROCHLORIDE 2 G: 2 INJECTION, POWDER, FOR SOLUTION INTRAVENOUS at 02:49

## 2019-01-01 RX ADMIN — OCTREOTIDE ACETATE 25 MCG: 50 INJECTION, SOLUTION INTRAVENOUS; SUBCUTANEOUS at 22:13

## 2019-01-01 RX ADMIN — PIPERACILLIN SODIUM AND TAZOBACTAM SODIUM 3.38 G: 3; .375 INJECTION, POWDER, LYOPHILIZED, FOR SOLUTION INTRAVENOUS at 01:12

## 2019-01-01 RX ADMIN — CASTOR OIL AND BALSAM, PERU: 788; 87 OINTMENT TOPICAL at 21:07

## 2019-01-01 RX ADMIN — HYDRALAZINE HYDROCHLORIDE 10 MG: 10 TABLET, FILM COATED ORAL at 09:13

## 2019-01-01 RX ADMIN — MAGNESIUM OXIDE TAB 400 MG (241.3 MG ELEMENTAL MG) 400 MG: 400 (241.3 MG) TAB at 08:43

## 2019-01-01 RX ADMIN — IPRATROPIUM BROMIDE AND ALBUTEROL SULFATE 3 ML: .5; 3 SOLUTION RESPIRATORY (INHALATION) at 20:09

## 2019-01-01 RX ADMIN — MILRINONE LACTATE IN DEXTROSE 0.3 MCG/KG/MIN: 200 INJECTION, SOLUTION INTRAVENOUS at 19:07

## 2019-01-01 RX ADMIN — DRONABINOL 2.5 MG: 2.5 CAPSULE ORAL at 17:49

## 2019-01-01 RX ADMIN — SILDENAFIL 40 MG: 20 TABLET ORAL at 16:10

## 2019-01-01 RX ADMIN — FLUTICASONE PROPIONATE 2 SPRAY: 50 SPRAY, METERED NASAL at 10:34

## 2019-01-01 RX ADMIN — MILRINONE LACTATE IN DEXTROSE 0.3 MCG/KG/MIN: 200 INJECTION, SOLUTION INTRAVENOUS at 11:10

## 2019-01-01 RX ADMIN — SODIUM CHLORIDE 6 ML/HR: 900 INJECTION, SOLUTION INTRAVENOUS at 21:37

## 2019-01-01 RX ADMIN — MAGNESIUM OXIDE TAB 400 MG (241.3 MG ELEMENTAL MG) 800 MG: 400 (241.3 MG) TAB at 18:04

## 2019-01-01 RX ADMIN — BUMETANIDE 2 MG: 1 TABLET ORAL at 17:41

## 2019-01-01 RX ADMIN — AMIODARONE HYDROCHLORIDE 100 MG: 200 TABLET ORAL at 09:40

## 2019-01-01 RX ADMIN — MAGNESIUM OXIDE TAB 400 MG (241.3 MG ELEMENTAL MG) 400 MG: 400 (241.3 MG) TAB at 18:49

## 2019-01-01 RX ADMIN — CASTOR OIL AND BALSAM, PERU: 788; 87 OINTMENT TOPICAL at 15:35

## 2019-01-01 RX ADMIN — OCTREOTIDE ACETATE 25 MCG: 50 INJECTION, SOLUTION INTRAVENOUS; SUBCUTANEOUS at 09:10

## 2019-01-01 RX ADMIN — MILRINONE LACTATE IN DEXTROSE 0.2 MCG/KG/MIN: 200 INJECTION, SOLUTION INTRAVENOUS at 08:53

## 2019-01-01 RX ADMIN — POTASSIUM CHLORIDE 20 MEQ: 400 INJECTION, SOLUTION INTRAVENOUS at 16:07

## 2019-01-01 RX ADMIN — PANTOPRAZOLE SODIUM 40 MG: 40 TABLET, DELAYED RELEASE ORAL at 09:30

## 2019-01-01 RX ADMIN — MAGNESIUM OXIDE TAB 400 MG (241.3 MG ELEMENTAL MG) 400 MG: 400 (241.3 MG) TAB at 17:54

## 2019-01-01 RX ADMIN — ESCITALOPRAM OXALATE 10 MG: 10 TABLET ORAL at 18:04

## 2019-01-01 RX ADMIN — ONDANSETRON 4 MG: 2 INJECTION INTRAMUSCULAR; INTRAVENOUS at 22:07

## 2019-01-01 RX ADMIN — AMIODARONE HYDROCHLORIDE 0.5 MG/MIN: 50 INJECTION, SOLUTION INTRAVENOUS at 03:30

## 2019-01-01 RX ADMIN — ALLOPURINOL 100 MG: 100 TABLET ORAL at 08:43

## 2019-01-01 RX ADMIN — OXYCODONE 5 MG: 5 TABLET ORAL at 04:05

## 2019-01-01 RX ADMIN — CASTOR OIL AND BALSAM, PERU: 788; 87 OINTMENT TOPICAL at 09:41

## 2019-01-01 RX ADMIN — TAMSULOSIN HYDROCHLORIDE 0.4 MG: 0.4 CAPSULE ORAL at 09:50

## 2019-01-01 RX ADMIN — ONDANSETRON 4 MG: 2 INJECTION INTRAMUSCULAR; INTRAVENOUS at 20:00

## 2019-01-01 RX ADMIN — TAMSULOSIN HYDROCHLORIDE 0.8 MG: 0.4 CAPSULE ORAL at 08:49

## 2019-01-01 RX ADMIN — PIPERACILLIN AND TAZOBACTAM 3.38 G: 3; .375 INJECTION, POWDER, LYOPHILIZED, FOR SOLUTION INTRAVENOUS at 11:06

## 2019-01-01 RX ADMIN — ACETAMINOPHEN 650 MG: 325 TABLET, FILM COATED ORAL at 07:21

## 2019-01-01 RX ADMIN — SALINE NASAL SPRAY 2 SPRAY: 1.5 SOLUTION NASAL at 21:08

## 2019-01-01 RX ADMIN — Medication 1 CAPSULE: at 09:30

## 2019-01-01 RX ADMIN — Medication 10 ML: at 22:15

## 2019-01-01 RX ADMIN — MAGNESIUM OXIDE TAB 400 MG (241.3 MG ELEMENTAL MG) 400 MG: 400 (241.3 MG) TAB at 17:29

## 2019-01-01 RX ADMIN — ACETAMINOPHEN 650 MG: 325 TABLET, FILM COATED ORAL at 23:25

## 2019-01-01 RX ADMIN — Medication 40 MCG/MIN: at 16:03

## 2019-01-01 RX ADMIN — DRONABINOL 5 MG: 2.5 CAPSULE ORAL at 17:17

## 2019-01-01 RX ADMIN — POTASSIUM CHLORIDE 40 MEQ: 750 TABLET, FILM COATED, EXTENDED RELEASE ORAL at 08:10

## 2019-01-01 RX ADMIN — MUPIROCIN: 20 OINTMENT TOPICAL at 18:30

## 2019-01-01 RX ADMIN — ALBUTEROL SULFATE 2.5 MG: 2.5 SOLUTION RESPIRATORY (INHALATION) at 12:34

## 2019-01-01 RX ADMIN — SILDENAFIL CITRATE 20 MG: 20 TABLET, FILM COATED ORAL at 17:13

## 2019-01-01 RX ADMIN — Medication 1 CAPSULE: at 08:40

## 2019-01-01 RX ADMIN — MILRINONE LACTATE IN DEXTROSE 0.3 MCG/KG/MIN: 200 INJECTION, SOLUTION INTRAVENOUS at 20:03

## 2019-01-01 RX ADMIN — SODIUM CHLORIDE, POTASSIUM CHLORIDE, SODIUM LACTATE AND CALCIUM CHLORIDE: 600; 310; 30; 20 INJECTION, SOLUTION INTRAVENOUS at 07:45

## 2019-01-01 RX ADMIN — SILDENAFIL 40 MG: 20 TABLET ORAL at 21:04

## 2019-01-01 RX ADMIN — IPRATROPIUM BROMIDE AND ALBUTEROL SULFATE 3 ML: .5; 3 SOLUTION RESPIRATORY (INHALATION) at 07:31

## 2019-01-01 RX ADMIN — IPRATROPIUM BROMIDE AND ALBUTEROL SULFATE 3 ML: .5; 3 SOLUTION RESPIRATORY (INHALATION) at 02:40

## 2019-01-01 RX ADMIN — MORPHINE SULFATE 4 MG: 4 INJECTION, SOLUTION INTRAMUSCULAR; INTRAVENOUS at 01:10

## 2019-01-01 RX ADMIN — ALLOPURINOL 100 MG: 100 TABLET ORAL at 08:45

## 2019-01-01 RX ADMIN — MILRINONE LACTATE IN DEXTROSE 0.38 MCG/KG/MIN: 200 INJECTION, SOLUTION INTRAVENOUS at 16:55

## 2019-01-01 RX ADMIN — SILDENAFIL 40 MG: 20 TABLET ORAL at 18:19

## 2019-01-01 RX ADMIN — MILRINONE LACTATE IN DEXTROSE 0.2 MCG/KG/MIN: 200 INJECTION, SOLUTION INTRAVENOUS at 14:41

## 2019-01-01 RX ADMIN — SODIUM CHLORIDE 4.2 UNITS/HR: 9 INJECTION, SOLUTION INTRAVENOUS at 20:18

## 2019-01-01 RX ADMIN — VANCOMYCIN HYDROCHLORIDE 1250 MG: 10 INJECTION, POWDER, LYOPHILIZED, FOR SOLUTION INTRAVENOUS at 08:16

## 2019-01-01 RX ADMIN — SUCRALFATE 1 G: 1 TABLET ORAL at 08:02

## 2019-01-01 RX ADMIN — DRONABINOL 2.5 MG: 2.5 CAPSULE ORAL at 16:46

## 2019-01-01 RX ADMIN — BUMETANIDE 0.5 MG/HR: 0.25 INJECTION INTRAMUSCULAR; INTRAVENOUS at 07:31

## 2019-01-01 RX ADMIN — ETOMIDATE 8 MG: 2 INJECTION INTRAVENOUS at 08:01

## 2019-01-01 RX ADMIN — ONDANSETRON 4 MG: 2 INJECTION INTRAMUSCULAR; INTRAVENOUS at 07:17

## 2019-01-01 RX ADMIN — SILDENAFIL 40 MG: 20 TABLET ORAL at 20:36

## 2019-01-01 RX ADMIN — IPRATROPIUM BROMIDE AND ALBUTEROL SULFATE 3 ML: .5; 3 SOLUTION RESPIRATORY (INHALATION) at 14:29

## 2019-01-01 RX ADMIN — LEVETIRACETAM 125 MG: 250 TABLET ORAL at 18:05

## 2019-01-01 RX ADMIN — PIPERACILLIN AND TAZOBACTAM 3.38 G: 3; .375 INJECTION, POWDER, FOR SOLUTION INTRAVENOUS at 20:25

## 2019-01-01 RX ADMIN — OXYCODONE 5 MG: 5 TABLET ORAL at 21:33

## 2019-01-01 RX ADMIN — DOBUTAMINE IN DEXTROSE 7.5 MCG/KG/MIN: 200 INJECTION, SOLUTION INTRAVENOUS at 20:54

## 2019-01-01 RX ADMIN — POLYETHYLENE GLYCOL 3350 17 G: 17 POWDER, FOR SOLUTION ORAL at 08:57

## 2019-01-01 RX ADMIN — IPRATROPIUM BROMIDE AND ALBUTEROL SULFATE 3 ML: .5; 3 SOLUTION RESPIRATORY (INHALATION) at 11:58

## 2019-01-01 RX ADMIN — TAMSULOSIN HYDROCHLORIDE 0.4 MG: 0.4 CAPSULE ORAL at 09:14

## 2019-01-01 RX ADMIN — ALLOPURINOL 100 MG: 100 TABLET ORAL at 09:14

## 2019-01-01 RX ADMIN — MILRINONE LACTATE IN DEXTROSE 0.38 MCG/KG/MIN: 200 INJECTION, SOLUTION INTRAVENOUS at 00:01

## 2019-01-01 RX ADMIN — POTASSIUM CHLORIDE 40 MEQ: 750 TABLET, FILM COATED, EXTENDED RELEASE ORAL at 08:50

## 2019-01-01 RX ADMIN — TAMSULOSIN HYDROCHLORIDE 0.4 MG: 0.4 CAPSULE ORAL at 08:10

## 2019-01-01 RX ADMIN — TAMSULOSIN HYDROCHLORIDE 0.8 MG: 0.4 CAPSULE ORAL at 09:31

## 2019-01-01 RX ADMIN — AMIODARONE HYDROCHLORIDE 100 MG: 200 TABLET ORAL at 17:25

## 2019-01-01 RX ADMIN — CASTOR OIL AND BALSAM, PERU: 788; 87 OINTMENT TOPICAL at 18:20

## 2019-01-01 RX ADMIN — MAGNESIUM SULFATE HEPTAHYDRATE 2 G: 40 INJECTION, SOLUTION INTRAVENOUS at 06:24

## 2019-01-01 RX ADMIN — PIPERACILLIN AND TAZOBACTAM 3.38 G: 3; .375 INJECTION, POWDER, LYOPHILIZED, FOR SOLUTION INTRAVENOUS at 00:10

## 2019-01-01 RX ADMIN — MAGNESIUM OXIDE TAB 400 MG (241.3 MG ELEMENTAL MG) 400 MG: 400 (241.3 MG) TAB at 18:47

## 2019-01-01 RX ADMIN — MILRINONE LACTATE IN DEXTROSE 0.38 MCG/KG/MIN: 200 INJECTION, SOLUTION INTRAVENOUS at 02:25

## 2019-01-01 RX ADMIN — SODIUM CHLORIDE 3 ML/HR: 900 INJECTION, SOLUTION INTRAVENOUS at 09:18

## 2019-01-01 RX ADMIN — BENZOCAINE AND MENTHOL 1 LOZENGE: 15; 3.6 LOZENGE ORAL at 22:02

## 2019-01-01 RX ADMIN — PIPERACILLIN SODIUM AND TAZOBACTAM SODIUM 3.38 G: 3; .375 INJECTION, POWDER, LYOPHILIZED, FOR SOLUTION INTRAVENOUS at 01:08

## 2019-01-01 RX ADMIN — TAMSULOSIN HYDROCHLORIDE 0.4 MG: 0.4 CAPSULE ORAL at 08:44

## 2019-01-01 RX ADMIN — EPOETIN ALFA-EPBX 10000 UNITS: 10000 INJECTION, SOLUTION INTRAVENOUS; SUBCUTANEOUS at 21:10

## 2019-01-01 RX ADMIN — SPIRONOLACTONE 25 MG: 25 TABLET ORAL at 10:38

## 2019-01-01 RX ADMIN — SUCRALFATE 1 G: 1 TABLET ORAL at 17:20

## 2019-01-01 RX ADMIN — FINASTERIDE 5 MG: 5 TABLET, FILM COATED ORAL at 09:56

## 2019-01-01 RX ADMIN — APIXABAN 5 MG: 5 TABLET, FILM COATED ORAL at 08:49

## 2019-01-01 RX ADMIN — SILDENAFIL CITRATE 20 MG: 20 TABLET ORAL at 17:01

## 2019-01-01 RX ADMIN — WARFARIN SODIUM 5 MG: 5 TABLET ORAL at 19:39

## 2019-01-01 RX ADMIN — BUMETANIDE 0.5 MG/HR: 0.25 INJECTION INTRAMUSCULAR; INTRAVENOUS at 11:58

## 2019-01-01 RX ADMIN — Medication 10 ML: at 22:19

## 2019-01-01 RX ADMIN — ALBUMIN (HUMAN) 25 G: 0.25 INJECTION, SOLUTION INTRAVENOUS at 12:40

## 2019-01-01 RX ADMIN — BUMETANIDE 2 MG: 0.25 INJECTION INTRAMUSCULAR; INTRAVENOUS at 08:13

## 2019-01-01 RX ADMIN — MIRTAZAPINE 15 MG: 15 TABLET, FILM COATED ORAL at 21:40

## 2019-01-01 RX ADMIN — STANDARDIZED SENNA CONCENTRATE AND DOCUSATE SODIUM 1 TABLET: 8.6; 5 TABLET ORAL at 08:51

## 2019-01-01 RX ADMIN — DEXMEDETOMIDINE HYDROCHLORIDE 4 MCG: 100 INJECTION, SOLUTION, CONCENTRATE INTRAVENOUS at 07:52

## 2019-01-01 RX ADMIN — BUDESONIDE 500 MCG: 0.5 INHALANT RESPIRATORY (INHALATION) at 20:20

## 2019-01-01 RX ADMIN — FAMOTIDINE 20 MG: 10 INJECTION, SOLUTION INTRAVENOUS at 22:31

## 2019-01-01 RX ADMIN — MAGNESIUM OXIDE TAB 400 MG (241.3 MG ELEMENTAL MG) 400 MG: 400 (241.3 MG) TAB at 08:55

## 2019-01-01 RX ADMIN — SUCRALFATE 1 G: 1 TABLET ORAL at 17:42

## 2019-01-01 RX ADMIN — PANTOPRAZOLE SODIUM 40 MG: 40 TABLET, DELAYED RELEASE ORAL at 07:14

## 2019-01-01 RX ADMIN — BUDESONIDE 500 MCG: 0.5 INHALANT RESPIRATORY (INHALATION) at 06:59

## 2019-01-01 RX ADMIN — IPRATROPIUM BROMIDE AND ALBUTEROL SULFATE 3 ML: .5; 3 SOLUTION RESPIRATORY (INHALATION) at 11:25

## 2019-01-01 RX ADMIN — OXYCODONE HYDROCHLORIDE 10 MG: 5 TABLET ORAL at 21:59

## 2019-01-01 RX ADMIN — BUDESONIDE 500 MCG: 0.5 INHALANT RESPIRATORY (INHALATION) at 11:28

## 2019-01-01 RX ADMIN — TAMSULOSIN HYDROCHLORIDE 0.4 MG: 0.4 CAPSULE ORAL at 08:57

## 2019-01-01 RX ADMIN — PHENAZOPYRIDINE 100 MG: 100 TABLET ORAL at 17:09

## 2019-01-01 RX ADMIN — DOCUSATE SODIUM AND SENNOSIDES 1 TABLET: 50; 8.6 TABLET, FILM COATED ORAL at 17:25

## 2019-01-01 RX ADMIN — WARFARIN SODIUM 1 MG: 1 TABLET ORAL at 17:09

## 2019-01-01 RX ADMIN — ACETAMINOPHEN 650 MG: 325 TABLET, FILM COATED ORAL at 01:45

## 2019-01-01 RX ADMIN — Medication 10 ML: at 06:56

## 2019-01-01 RX ADMIN — SALINE NASAL SPRAY 2 SPRAY: 1.5 SOLUTION NASAL at 22:03

## 2019-01-01 RX ADMIN — BUMETANIDE 1 MG: 0.25 INJECTION INTRAMUSCULAR; INTRAVENOUS at 17:43

## 2019-01-01 RX ADMIN — ALBUTEROL SULFATE 2.5 MG: 2.5 SOLUTION RESPIRATORY (INHALATION) at 00:05

## 2019-01-01 RX ADMIN — ALLOPURINOL 100 MG: 100 TABLET ORAL at 09:29

## 2019-01-01 RX ADMIN — LEVETIRACETAM 250 MG: 100 INJECTION, SOLUTION, CONCENTRATE INTRAVENOUS at 06:02

## 2019-01-01 RX ADMIN — SILDENAFIL CITRATE 20 MG: 20 TABLET, FILM COATED ORAL at 01:35

## 2019-01-01 RX ADMIN — ESCITALOPRAM OXALATE 10 MG: 10 TABLET ORAL at 17:33

## 2019-01-01 RX ADMIN — ARFORMOTEROL TARTRATE 15 MCG: 15 SOLUTION RESPIRATORY (INHALATION) at 22:13

## 2019-01-01 RX ADMIN — SALINE NASAL SPRAY 2 SPRAY: 1.5 SOLUTION NASAL at 14:26

## 2019-01-01 RX ADMIN — AMIODARONE HYDROCHLORIDE 100 MG: 200 TABLET ORAL at 17:03

## 2019-01-01 RX ADMIN — PIPERACILLIN SODIUM AND TAZOBACTAM SODIUM 3.38 G: 3; .375 INJECTION, POWDER, LYOPHILIZED, FOR SOLUTION INTRAVENOUS at 16:53

## 2019-01-01 RX ADMIN — ASPIRIN 81 MG 81 MG: 81 TABLET ORAL at 08:57

## 2019-01-01 RX ADMIN — OXYMETAZOLINE HYDROCHLORIDE 2 SPRAY: 0.05 SPRAY NASAL at 10:44

## 2019-01-01 RX ADMIN — PIPERACILLIN AND TAZOBACTAM 3.38 G: 3; .375 INJECTION, POWDER, LYOPHILIZED, FOR SOLUTION INTRAVENOUS at 00:22

## 2019-01-01 RX ADMIN — POTASSIUM CHLORIDE 20 MEQ: 750 TABLET, FILM COATED, EXTENDED RELEASE ORAL at 08:26

## 2019-01-01 RX ADMIN — OXYCODONE 5 MG: 5 TABLET ORAL at 02:29

## 2019-01-01 RX ADMIN — MAGNESIUM OXIDE TAB 400 MG (241.3 MG ELEMENTAL MG) 400 MG: 400 (241.3 MG) TAB at 08:23

## 2019-01-01 RX ADMIN — LEVETIRACETAM 250 MG: 100 SOLUTION ORAL at 18:03

## 2019-01-01 RX ADMIN — BIVALIRUDIN 0.22 MG/KG/HR: 250 INJECTION, POWDER, LYOPHILIZED, FOR SOLUTION INTRAVENOUS at 04:02

## 2019-01-01 RX ADMIN — DOCUSATE SODIUM AND SENNOSIDES 1 TABLET: 50; 8.6 TABLET, FILM COATED ORAL at 10:30

## 2019-01-01 RX ADMIN — MAGNESIUM OXIDE TAB 400 MG (241.3 MG ELEMENTAL MG) 400 MG: 400 (241.3 MG) TAB at 08:48

## 2019-01-01 RX ADMIN — BUMETANIDE 2 MG: 0.25 INJECTION INTRAMUSCULAR; INTRAVENOUS at 09:25

## 2019-01-01 RX ADMIN — LEVETIRACETAM 250 MG: 100 SOLUTION ORAL at 07:10

## 2019-01-01 RX ADMIN — POTASSIUM CHLORIDE 60 MEQ: 750 TABLET, FILM COATED, EXTENDED RELEASE ORAL at 16:58

## 2019-01-01 RX ADMIN — IPRATROPIUM BROMIDE AND ALBUTEROL SULFATE 3 ML: .5; 3 SOLUTION RESPIRATORY (INHALATION) at 08:11

## 2019-01-01 RX ADMIN — CASTOR OIL AND BALSAM, PERU: 788; 87 OINTMENT TOPICAL at 19:11

## 2019-01-01 RX ADMIN — PIPERACILLIN AND TAZOBACTAM 3.38 G: 3; .375 INJECTION, POWDER, FOR SOLUTION INTRAVENOUS at 03:35

## 2019-01-01 RX ADMIN — DEXMEDETOMIDINE HYDROCHLORIDE 0.9 MCG/KG/HR: 100 INJECTION, SOLUTION, CONCENTRATE INTRAVENOUS at 02:44

## 2019-01-01 RX ADMIN — WARFARIN SODIUM 4 MG: 4 TABLET ORAL at 17:19

## 2019-01-01 RX ADMIN — LEVETIRACETAM 125 MG: 250 TABLET ORAL at 08:27

## 2019-01-01 RX ADMIN — ALBUTEROL SULFATE 2.5 MG: 2.5 SOLUTION RESPIRATORY (INHALATION) at 15:32

## 2019-01-01 RX ADMIN — TAMSULOSIN HYDROCHLORIDE 0.4 MG: 0.4 CAPSULE ORAL at 08:43

## 2019-01-01 RX ADMIN — DRONABINOL 2.5 MG: 2.5 CAPSULE ORAL at 17:20

## 2019-01-01 RX ADMIN — SUFENTANIL CITRATE 0.3 MCG/KG/HR: 50 INJECTION EPIDURAL; INTRAVENOUS at 08:05

## 2019-01-01 RX ADMIN — TAMSULOSIN HYDROCHLORIDE 0.4 MG: 0.4 CAPSULE ORAL at 08:51

## 2019-01-01 RX ADMIN — SODIUM CHLORIDE 500 ML: 900 INJECTION, SOLUTION INTRAVENOUS at 18:25

## 2019-01-01 RX ADMIN — ALLOPURINOL 100 MG: 100 TABLET ORAL at 10:30

## 2019-01-01 RX ADMIN — CHLORHEXIDINE GLUCONATE 10 ML: 1.2 RINSE ORAL at 22:21

## 2019-01-01 RX ADMIN — OXYMETAZOLINE HYDROCHLORIDE 2 SPRAY: 0.05 SPRAY NASAL at 18:27

## 2019-01-01 RX ADMIN — MUPIROCIN: 20 OINTMENT TOPICAL at 09:32

## 2019-01-01 RX ADMIN — DOCUSATE SODIUM AND SENNOSIDES 1 TABLET: 50; 8.6 TABLET, FILM COATED ORAL at 09:29

## 2019-01-01 RX ADMIN — CEFEPIME 2 G: 2 INJECTION, POWDER, FOR SOLUTION INTRAVENOUS at 03:23

## 2019-01-01 RX ADMIN — ALLOPURINOL 100 MG: 100 TABLET ORAL at 08:03

## 2019-01-01 RX ADMIN — CHLORHEXIDINE GLUCONATE 10 ML: 1.2 RINSE ORAL at 09:06

## 2019-01-01 RX ADMIN — Medication 40 ML: at 15:42

## 2019-01-01 RX ADMIN — BUDESONIDE 500 MCG: 0.5 INHALANT RESPIRATORY (INHALATION) at 10:21

## 2019-01-01 RX ADMIN — Medication 40 ML: at 21:11

## 2019-01-01 RX ADMIN — BUDESONIDE 500 MCG: 0.5 INHALANT RESPIRATORY (INHALATION) at 21:24

## 2019-01-01 RX ADMIN — AMIODARONE HYDROCHLORIDE 100 MG: 200 TABLET ORAL at 08:03

## 2019-01-01 RX ADMIN — SPIRONOLACTONE 25 MG: 25 TABLET ORAL at 08:38

## 2019-01-01 RX ADMIN — CASTOR OIL AND BALSAM, PERU: 788; 87 OINTMENT TOPICAL at 09:52

## 2019-01-01 RX ADMIN — MILRINONE LACTATE IN DEXTROSE 0.38 MCG/KG/MIN: 200 INJECTION, SOLUTION INTRAVENOUS at 01:25

## 2019-01-01 RX ADMIN — Medication 1 SPRAY: at 14:52

## 2019-01-01 RX ADMIN — SILDENAFIL CITRATE 20 MG: 20 TABLET ORAL at 08:23

## 2019-01-01 RX ADMIN — MAGNESIUM SULFATE HEPTAHYDRATE 1 G: 1 INJECTION, SOLUTION INTRAVENOUS at 17:12

## 2019-01-01 RX ADMIN — SILDENAFIL CITRATE 20 MG: 20 TABLET, FILM COATED ORAL at 18:45

## 2019-01-01 RX ADMIN — BENZOCAINE AND MENTHOL 1 LOZENGE: 15; 3.6 LOZENGE ORAL at 22:09

## 2019-01-01 RX ADMIN — DRONABINOL 2.5 MG: 2.5 CAPSULE ORAL at 17:03

## 2019-01-01 RX ADMIN — CASTOR OIL AND BALSAM, PERU: 788; 87 OINTMENT TOPICAL at 17:43

## 2019-01-01 RX ADMIN — ACETAMINOPHEN 650 MG: 325 TABLET, FILM COATED ORAL at 06:34

## 2019-01-01 RX ADMIN — MUPIROCIN: 20 OINTMENT TOPICAL at 17:40

## 2019-01-01 RX ADMIN — ALBUMIN (HUMAN) 12.5 G: 0.25 INJECTION, SOLUTION INTRAVENOUS at 17:15

## 2019-01-01 RX ADMIN — CASTOR OIL AND BALSAM, PERU: 788; 87 OINTMENT TOPICAL at 22:20

## 2019-01-01 RX ADMIN — OXYMETAZOLINE HYDROCHLORIDE 2 SPRAY: 0.05 SPRAY NASAL at 17:42

## 2019-01-01 RX ADMIN — MAGNESIUM SULFATE HEPTAHYDRATE 1 G: 1 INJECTION, SOLUTION INTRAVENOUS at 09:09

## 2019-01-01 RX ADMIN — BUDESONIDE 500 MCG: 0.5 INHALANT RESPIRATORY (INHALATION) at 07:15

## 2019-01-01 RX ADMIN — WARFARIN SODIUM 5 MG: 5 TABLET ORAL at 16:10

## 2019-01-01 RX ADMIN — EPOETIN ALFA-EPBX 20000 UNITS: 10000 INJECTION, SOLUTION INTRAVENOUS; SUBCUTANEOUS at 21:16

## 2019-01-01 RX ADMIN — OCTREOTIDE ACETATE 25 MCG: 50 INJECTION, SOLUTION INTRAVENOUS; SUBCUTANEOUS at 16:00

## 2019-01-01 RX ADMIN — LIDOCAINE HYDROCHLORIDE 100 MG: 20 INJECTION, SOLUTION EPIDURAL; INFILTRATION; INTRACAUDAL; PERINEURAL at 07:55

## 2019-01-01 RX ADMIN — Medication 10 ML: at 15:38

## 2019-01-01 RX ADMIN — CHLORHEXIDINE GLUCONATE 10 ML: 1.2 RINSE ORAL at 20:45

## 2019-01-01 RX ADMIN — SUCRALFATE 1 G: 1 TABLET ORAL at 17:29

## 2019-01-01 RX ADMIN — SILDENAFIL CITRATE 20 MG: 20 TABLET, FILM COATED ORAL at 17:20

## 2019-01-01 RX ADMIN — SUCRALFATE 1 G: 1 TABLET ORAL at 06:50

## 2019-01-01 RX ADMIN — MAGNESIUM OXIDE TAB 400 MG (241.3 MG ELEMENTAL MG) 400 MG: 400 (241.3 MG) TAB at 17:55

## 2019-01-01 RX ADMIN — PANTOPRAZOLE SODIUM 40 MG: 40 TABLET, DELAYED RELEASE ORAL at 06:37

## 2019-01-01 RX ADMIN — LEVETIRACETAM 250 MG: 100 SOLUTION ORAL at 21:24

## 2019-01-01 RX ADMIN — SPIRONOLACTONE 25 MG: 25 TABLET ORAL at 08:25

## 2019-01-01 RX ADMIN — MAGNESIUM OXIDE TAB 400 MG (241.3 MG ELEMENTAL MG) 400 MG: 400 (241.3 MG) TAB at 08:50

## 2019-01-01 RX ADMIN — BUMETANIDE 0.5 MG/HR: 0.25 INJECTION INTRAMUSCULAR; INTRAVENOUS at 21:14

## 2019-01-01 RX ADMIN — FINASTERIDE 5 MG: 5 TABLET, FILM COATED ORAL at 09:24

## 2019-01-01 RX ADMIN — POTASSIUM CHLORIDE 20 MEQ: 750 TABLET, FILM COATED, EXTENDED RELEASE ORAL at 21:06

## 2019-01-01 RX ADMIN — OCTREOTIDE ACETATE 25 MCG: 50 INJECTION, SOLUTION INTRAVENOUS; SUBCUTANEOUS at 17:55

## 2019-01-01 RX ADMIN — MAGNESIUM OXIDE TAB 400 MG (241.3 MG ELEMENTAL MG) 400 MG: 400 (241.3 MG) TAB at 08:38

## 2019-01-01 RX ADMIN — POLYETHYLENE GLYCOL 3350 17 G: 17 POWDER, FOR SOLUTION ORAL at 08:39

## 2019-01-01 RX ADMIN — CEFEPIME 2 G: 2 INJECTION, POWDER, FOR SOLUTION INTRAVENOUS at 19:34

## 2019-01-01 RX ADMIN — SILDENAFIL 40 MG: 20 TABLET ORAL at 08:42

## 2019-01-01 RX ADMIN — ALBUMIN (HUMAN) 12.5 G: 0.25 INJECTION, SOLUTION INTRAVENOUS at 09:25

## 2019-01-01 RX ADMIN — IPRATROPIUM BROMIDE 0.5 MG: 0.5 SOLUTION RESPIRATORY (INHALATION) at 17:21

## 2019-01-01 RX ADMIN — DRONABINOL 5 MG: 2.5 CAPSULE ORAL at 16:59

## 2019-01-01 RX ADMIN — Medication 10 ML: at 21:08

## 2019-01-01 RX ADMIN — ASPIRIN 75 MG: 300 SUPPOSITORY RECTAL at 09:00

## 2019-01-01 RX ADMIN — ESCITALOPRAM OXALATE 10 MG: 10 TABLET ORAL at 17:05

## 2019-01-01 RX ADMIN — ACETAMINOPHEN 650 MG: 325 TABLET, FILM COATED ORAL at 15:59

## 2019-01-01 RX ADMIN — POLYETHYLENE GLYCOL 3350 17 G: 17 POWDER, FOR SOLUTION ORAL at 09:25

## 2019-01-01 RX ADMIN — PANTOPRAZOLE SODIUM 40 MG: 40 TABLET, DELAYED RELEASE ORAL at 06:31

## 2019-01-01 RX ADMIN — ACETAMINOPHEN 650 MG: 325 TABLET, FILM COATED ORAL at 18:16

## 2019-01-01 RX ADMIN — MILRINONE LACTATE IN DEXTROSE 0.38 MCG/KG/MIN: 200 INJECTION, SOLUTION INTRAVENOUS at 05:30

## 2019-01-01 RX ADMIN — ALLOPURINOL 100 MG: 100 TABLET ORAL at 08:42

## 2019-01-01 RX ADMIN — DOCUSATE SODIUM AND SENNOSIDES 1 TABLET: 50; 8.6 TABLET, FILM COATED ORAL at 08:45

## 2019-01-01 RX ADMIN — SUCRALFATE 1 G: 1 TABLET ORAL at 22:19

## 2019-01-01 RX ADMIN — MUPIROCIN: 20 OINTMENT TOPICAL at 18:46

## 2019-01-01 RX ADMIN — MAGNESIUM OXIDE TAB 400 MG (241.3 MG ELEMENTAL MG) 400 MG: 400 (241.3 MG) TAB at 09:16

## 2019-01-01 RX ADMIN — ARFORMOTEROL TARTRATE 15 MCG: 15 SOLUTION RESPIRATORY (INHALATION) at 21:10

## 2019-01-01 RX ADMIN — CASTOR OIL AND BALSAM, PERU: 788; 87 OINTMENT TOPICAL at 17:37

## 2019-01-01 RX ADMIN — BUMETANIDE 1 MG: 0.25 INJECTION INTRAMUSCULAR; INTRAVENOUS at 09:11

## 2019-01-01 RX ADMIN — ARFORMOTEROL TARTRATE 15 MCG: 15 SOLUTION RESPIRATORY (INHALATION) at 19:49

## 2019-01-01 RX ADMIN — DRONABINOL 2.5 MG: 2.5 CAPSULE ORAL at 13:22

## 2019-01-01 RX ADMIN — SODIUM CHLORIDE 600 MG: 9 INJECTION, SOLUTION INTRAVENOUS at 04:31

## 2019-01-01 RX ADMIN — OCTREOTIDE ACETATE 25 MCG: 50 INJECTION, SOLUTION INTRAVENOUS; SUBCUTANEOUS at 17:20

## 2019-01-01 RX ADMIN — ALBUMIN (HUMAN) 25 G: 0.25 INJECTION, SOLUTION INTRAVENOUS at 08:20

## 2019-01-01 RX ADMIN — DRONABINOL 2.5 MG: 2.5 CAPSULE ORAL at 18:04

## 2019-01-01 RX ADMIN — OXYCODONE HYDROCHLORIDE 5 MG: 5 TABLET ORAL at 14:12

## 2019-01-01 RX ADMIN — HYDRALAZINE HYDROCHLORIDE 10 MG: 10 TABLET, FILM COATED ORAL at 17:28

## 2019-01-01 RX ADMIN — DEXTROSE MONOHYDRATE 18.1 ML/HR: 5 INJECTION, SOLUTION INTRAVENOUS at 12:04

## 2019-01-01 RX ADMIN — ACETAMINOPHEN 650 MG: 325 TABLET, FILM COATED ORAL at 21:12

## 2019-01-01 RX ADMIN — DEXMEDETOMIDINE HYDROCHLORIDE 1 MCG/KG/HR: 100 INJECTION, SOLUTION, CONCENTRATE INTRAVENOUS at 05:53

## 2019-01-01 RX ADMIN — PHENAZOPYRIDINE 100 MG: 100 TABLET ORAL at 22:32

## 2019-01-01 RX ADMIN — ALBUMIN (HUMAN) 25 G: 0.25 INJECTION, SOLUTION INTRAVENOUS at 12:25

## 2019-01-01 RX ADMIN — OCTREOTIDE ACETATE 25 MCG: 50 INJECTION, SOLUTION INTRAVENOUS; SUBCUTANEOUS at 08:54

## 2019-01-01 RX ADMIN — Medication 40 ML: at 21:41

## 2019-01-01 RX ADMIN — POTASSIUM CHLORIDE 60 MEQ: 750 TABLET, FILM COATED, EXTENDED RELEASE ORAL at 16:31

## 2019-01-01 RX ADMIN — DOBUTAMINE IN DEXTROSE 7.5 MCG/KG/MIN: 200 INJECTION, SOLUTION INTRAVENOUS at 05:06

## 2019-01-01 RX ADMIN — BUMETANIDE 2 MG: 0.25 INJECTION INTRAMUSCULAR; INTRAVENOUS at 09:23

## 2019-01-01 RX ADMIN — IPRATROPIUM BROMIDE AND ALBUTEROL SULFATE 3 ML: .5; 3 SOLUTION RESPIRATORY (INHALATION) at 19:45

## 2019-01-01 RX ADMIN — Medication 10 ML: at 09:41

## 2019-01-01 RX ADMIN — DEXMEDETOMIDINE HYDROCHLORIDE 0.6 MCG/KG/HR: 100 INJECTION, SOLUTION, CONCENTRATE INTRAVENOUS at 09:00

## 2019-01-01 RX ADMIN — MAGNESIUM OXIDE TAB 400 MG (241.3 MG ELEMENTAL MG) 400 MG: 400 (241.3 MG) TAB at 17:32

## 2019-01-01 RX ADMIN — BUMETANIDE 1 MG: 0.25 INJECTION INTRAMUSCULAR; INTRAVENOUS at 16:58

## 2019-01-01 RX ADMIN — SILDENAFIL CITRATE 20 MG: 20 TABLET, FILM COATED ORAL at 10:53

## 2019-01-01 RX ADMIN — DRONABINOL 2.5 MG: 2.5 CAPSULE ORAL at 17:33

## 2019-01-01 RX ADMIN — MILRINONE LACTATE IN DEXTROSE 0.38 MCG/KG/MIN: 200 INJECTION, SOLUTION INTRAVENOUS at 19:35

## 2019-01-01 RX ADMIN — PANTOPRAZOLE SODIUM 40 MG: 40 TABLET, DELAYED RELEASE ORAL at 07:35

## 2019-01-01 RX ADMIN — LEVETIRACETAM 250 MG: 100 SOLUTION ORAL at 18:01

## 2019-01-01 RX ADMIN — SILDENAFIL CITRATE 20 MG: 20 TABLET ORAL at 21:47

## 2019-01-01 RX ADMIN — PROTAMINE SULFATE 200 MG: 10 INJECTION, SOLUTION INTRAVENOUS at 11:52

## 2019-01-01 RX ADMIN — IPRATROPIUM BROMIDE AND ALBUTEROL SULFATE 3 ML: .5; 3 SOLUTION RESPIRATORY (INHALATION) at 08:21

## 2019-01-01 RX ADMIN — OXYCODONE 5 MG: 5 TABLET ORAL at 23:25

## 2019-01-01 RX ADMIN — POLYETHYLENE GLYCOL 3350 17 G: 17 POWDER, FOR SOLUTION ORAL at 12:45

## 2019-01-01 RX ADMIN — WARFARIN SODIUM 2 MG: 2 TABLET ORAL at 16:21

## 2019-01-01 RX ADMIN — BIVALIRUDIN 0.22 MG/KG/HR: 250 INJECTION, POWDER, LYOPHILIZED, FOR SOLUTION INTRAVENOUS at 00:12

## 2019-01-01 RX ADMIN — LEVOFLOXACIN 500 MG: 5 INJECTION, SOLUTION INTRAVENOUS at 05:24

## 2019-01-01 RX ADMIN — PHENAZOPYRIDINE 100 MG: 100 TABLET ORAL at 11:00

## 2019-01-01 RX ADMIN — SILDENAFIL CITRATE 20 MG: 20 TABLET ORAL at 21:41

## 2019-01-01 RX ADMIN — INSULIN LISPRO 2 UNITS: 100 INJECTION, SOLUTION INTRAVENOUS; SUBCUTANEOUS at 17:16

## 2019-01-01 RX ADMIN — DRONABINOL 2.5 MG: 2.5 CAPSULE ORAL at 06:30

## 2019-01-01 RX ADMIN — ALLOPURINOL 100 MG: 100 TABLET ORAL at 08:26

## 2019-01-01 RX ADMIN — BUMETANIDE 2 MG: 0.25 INJECTION INTRAMUSCULAR; INTRAVENOUS at 21:11

## 2019-01-01 RX ADMIN — MAGNESIUM OXIDE TAB 400 MG (241.3 MG ELEMENTAL MG) 400 MG: 400 (241.3 MG) TAB at 08:57

## 2019-01-01 RX ADMIN — WARFARIN SODIUM 3 MG: 1 TABLET ORAL at 18:02

## 2019-01-01 RX ADMIN — CASTOR OIL AND BALSAM, PERU: 788; 87 OINTMENT TOPICAL at 21:30

## 2019-01-01 RX ADMIN — SUCRALFATE 1 G: 1 TABLET ORAL at 21:47

## 2019-01-01 RX ADMIN — POTASSIUM CHLORIDE 60 MEQ: 750 TABLET, FILM COATED, EXTENDED RELEASE ORAL at 21:11

## 2019-01-01 RX ADMIN — Medication 10 ML: at 05:43

## 2019-01-01 RX ADMIN — DOBUTAMINE IN DEXTROSE 5 MCG/KG/MIN: 200 INJECTION, SOLUTION INTRAVENOUS at 09:41

## 2019-01-01 RX ADMIN — POTASSIUM CHLORIDE 20 MEQ: 750 TABLET, FILM COATED, EXTENDED RELEASE ORAL at 08:39

## 2019-01-01 RX ADMIN — SILDENAFIL 40 MG: 20 TABLET ORAL at 21:39

## 2019-01-01 RX ADMIN — SILDENAFIL 40 MG: 20 TABLET ORAL at 18:04

## 2019-01-01 RX ADMIN — ACETAMINOPHEN 650 MG: 325 TABLET, FILM COATED ORAL at 10:30

## 2019-01-01 RX ADMIN — ALBUTEROL SULFATE 2.5 MG: 2.5 SOLUTION RESPIRATORY (INHALATION) at 04:17

## 2019-01-01 RX ADMIN — LEVETIRACETAM 250 MG: 100 INJECTION, SOLUTION, CONCENTRATE INTRAVENOUS at 19:10

## 2019-01-01 RX ADMIN — ONDANSETRON 4 MG: 2 INJECTION INTRAMUSCULAR; INTRAVENOUS at 15:05

## 2019-01-01 RX ADMIN — BUDESONIDE 500 MCG: 0.5 INHALANT RESPIRATORY (INHALATION) at 07:11

## 2019-01-01 RX ADMIN — BUMETANIDE 2 MG: 0.25 INJECTION INTRAMUSCULAR; INTRAVENOUS at 12:54

## 2019-01-01 RX ADMIN — PIPERACILLIN SODIUM AND TAZOBACTAM SODIUM 3.38 G: 3; .375 INJECTION, POWDER, LYOPHILIZED, FOR SOLUTION INTRAVENOUS at 18:14

## 2019-01-01 RX ADMIN — DEXTROSE MONOHYDRATE 8.6 ML/HR: 5 INJECTION, SOLUTION INTRAVENOUS at 04:42

## 2019-01-01 RX ADMIN — WARFARIN SODIUM 5 MG: 5 TABLET ORAL at 17:00

## 2019-01-01 RX ADMIN — BUMETANIDE 2 MG: 0.25 INJECTION INTRAMUSCULAR; INTRAVENOUS at 08:43

## 2019-01-01 RX ADMIN — SUCRALFATE 1 G: 1 TABLET ORAL at 17:00

## 2019-01-01 RX ADMIN — IPRATROPIUM BROMIDE AND ALBUTEROL SULFATE 3 ML: .5; 3 SOLUTION RESPIRATORY (INHALATION) at 22:13

## 2019-01-01 RX ADMIN — EPOETIN ALFA-EPBX 20000 UNITS: 10000 INJECTION, SOLUTION INTRAVENOUS; SUBCUTANEOUS at 08:39

## 2019-01-01 RX ADMIN — SODIUM CHLORIDE 600 MG: 9 INJECTION, SOLUTION INTRAVENOUS at 04:00

## 2019-01-01 RX ADMIN — DEXMEDETOMIDINE HYDROCHLORIDE 0.4 MCG/KG/HR: 100 INJECTION, SOLUTION, CONCENTRATE INTRAVENOUS at 11:03

## 2019-01-01 RX ADMIN — Medication 10 ML: at 05:02

## 2019-01-01 RX ADMIN — MAGNESIUM OXIDE TAB 400 MG (241.3 MG ELEMENTAL MG) 400 MG: 400 (241.3 MG) TAB at 17:43

## 2019-01-01 RX ADMIN — MIDAZOLAM 1 MG: 1 INJECTION INTRAMUSCULAR; INTRAVENOUS at 08:00

## 2019-01-01 RX ADMIN — CASTOR OIL AND BALSAM, PERU: 788; 87 OINTMENT TOPICAL at 21:25

## 2019-01-01 RX ADMIN — BUMETANIDE 2 MG: 0.25 INJECTION INTRAMUSCULAR; INTRAVENOUS at 21:47

## 2019-01-01 RX ADMIN — PROTHROMBIN, COAGULATION FACTOR VII HUMAN, COAGULATION FACTOR IX HUMAN, COAGULATION FACTOR X HUMAN, PROTEIN C, PROTEIN S HUMAN, AND WATER 1000 INT'L UNITS: KIT at 13:02

## 2019-01-01 RX ADMIN — INSULIN LISPRO 2 UNITS: 100 INJECTION, SOLUTION INTRAVENOUS; SUBCUTANEOUS at 12:24

## 2019-01-01 RX ADMIN — POLYETHYLENE GLYCOL 3350, SODIUM SULFATE ANHYDROUS, SODIUM BICARBONATE, SODIUM CHLORIDE, POTASSIUM CHLORIDE 4000 ML: 236; 22.74; 6.74; 5.86; 2.97 POWDER, FOR SOLUTION ORAL at 11:16

## 2019-01-01 RX ADMIN — Medication 10 ML: at 20:55

## 2019-01-01 RX ADMIN — PANTOPRAZOLE SODIUM 40 MG: 40 TABLET, DELAYED RELEASE ORAL at 09:13

## 2019-01-01 RX ADMIN — PANTOPRAZOLE SODIUM 40 MG: 40 TABLET, DELAYED RELEASE ORAL at 07:40

## 2019-01-01 RX ADMIN — Medication 1 CAPSULE: at 08:56

## 2019-01-01 RX ADMIN — Medication 20 ML: at 10:39

## 2019-01-01 RX ADMIN — DIGOXIN 0.06 MG: 125 TABLET ORAL at 22:18

## 2019-01-01 RX ADMIN — BUMETANIDE 3 MG: 0.25 INJECTION INTRAMUSCULAR; INTRAVENOUS at 09:10

## 2019-01-01 RX ADMIN — OXYCODONE HYDROCHLORIDE 5 MG: 5 TABLET ORAL at 11:32

## 2019-01-01 RX ADMIN — SPIRONOLACTONE 25 MG: 25 TABLET ORAL at 12:05

## 2019-01-01 RX ADMIN — IPRATROPIUM BROMIDE AND ALBUTEROL SULFATE 3 ML: .5; 3 SOLUTION RESPIRATORY (INHALATION) at 09:47

## 2019-01-01 RX ADMIN — BIVALIRUDIN 0.22 MG/KG/HR: 250 INJECTION, POWDER, LYOPHILIZED, FOR SOLUTION INTRAVENOUS at 16:07

## 2019-01-01 RX ADMIN — Medication 10 ML: at 06:19

## 2019-01-01 RX ADMIN — MILRINONE LACTATE IN DEXTROSE 0.2 MCG/KG/MIN: 200 INJECTION, SOLUTION INTRAVENOUS at 02:35

## 2019-01-01 RX ADMIN — IPRATROPIUM BROMIDE AND ALBUTEROL SULFATE 3 ML: .5; 3 SOLUTION RESPIRATORY (INHALATION) at 20:29

## 2019-01-01 RX ADMIN — POTASSIUM CHLORIDE 20 MEQ: 29.8 INJECTION, SOLUTION INTRAVENOUS at 10:45

## 2019-01-01 RX ADMIN — Medication 40 ML: at 22:10

## 2019-01-01 RX ADMIN — ALBUMIN (HUMAN) 12.5 G: 0.25 INJECTION, SOLUTION INTRAVENOUS at 17:07

## 2019-01-01 RX ADMIN — SUCRALFATE 1 G: 1 TABLET ORAL at 07:30

## 2019-01-01 RX ADMIN — CASTOR OIL AND BALSAM, PERU: 788; 87 OINTMENT TOPICAL at 09:11

## 2019-01-01 RX ADMIN — POTASSIUM CHLORIDE 20 MEQ: 29.8 INJECTION, SOLUTION INTRAVENOUS at 04:56

## 2019-01-01 RX ADMIN — Medication 10 ML: at 07:05

## 2019-01-01 RX ADMIN — SUCRALFATE 1 G: 1 TABLET ORAL at 07:19

## 2019-01-01 RX ADMIN — Medication 10 ML: at 21:10

## 2019-01-01 RX ADMIN — MAGNESIUM OXIDE TAB 400 MG (241.3 MG ELEMENTAL MG) 400 MG: 400 (241.3 MG) TAB at 08:42

## 2019-01-01 RX ADMIN — IPRATROPIUM BROMIDE AND ALBUTEROL SULFATE 3 ML: .5; 3 SOLUTION RESPIRATORY (INHALATION) at 11:16

## 2019-01-01 RX ADMIN — BUDESONIDE 500 MCG: 0.5 INHALANT RESPIRATORY (INHALATION) at 20:09

## 2019-01-01 RX ADMIN — SUCRALFATE 1 G: 1 TABLET ORAL at 21:50

## 2019-01-01 RX ADMIN — THERA TABS 1 TABLET: TAB at 09:16

## 2019-01-01 RX ADMIN — OCTREOTIDE ACETATE 25 MCG: 50 INJECTION, SOLUTION INTRAVENOUS; SUBCUTANEOUS at 09:38

## 2019-01-01 RX ADMIN — SUCRALFATE 1 G: 1 TABLET ORAL at 16:10

## 2019-01-01 RX ADMIN — SILDENAFIL 40 MG: 20 TABLET ORAL at 17:18

## 2019-01-01 RX ADMIN — PANTOPRAZOLE SODIUM 40 MG: 40 TABLET, DELAYED RELEASE ORAL at 08:02

## 2019-01-01 RX ADMIN — ALBUMIN (HUMAN) 12.5 G: 12.5 INJECTION, SOLUTION INTRAVENOUS at 10:37

## 2019-01-01 RX ADMIN — SUCRALFATE 1 G: 1 TABLET ORAL at 07:06

## 2019-01-01 RX ADMIN — CASTOR OIL AND BALSAM, PERU: 788; 87 OINTMENT TOPICAL at 00:01

## 2019-01-01 RX ADMIN — CEFEPIME 2 G: 2 INJECTION, POWDER, FOR SOLUTION INTRAVENOUS at 21:59

## 2019-01-01 RX ADMIN — CEFAZOLIN 1 G: 1 INJECTION, POWDER, FOR SOLUTION INTRAMUSCULAR; INTRAVENOUS; PARENTERAL at 08:15

## 2019-01-01 RX ADMIN — SUCRALFATE 1 G: 1 TABLET ORAL at 06:14

## 2019-01-01 RX ADMIN — BUMETANIDE 2 MG: 0.25 INJECTION INTRAMUSCULAR; INTRAVENOUS at 08:23

## 2019-01-01 RX ADMIN — BIVALIRUDIN 0.11 MG/KG/HR: 250 INJECTION, POWDER, LYOPHILIZED, FOR SOLUTION INTRAVENOUS at 02:13

## 2019-01-01 RX ADMIN — WARFARIN SODIUM 3 MG: 1 TABLET ORAL at 17:05

## 2019-01-01 RX ADMIN — TAMSULOSIN HYDROCHLORIDE 0.4 MG: 0.4 CAPSULE ORAL at 08:16

## 2019-01-01 RX ADMIN — OXYCODONE HYDROCHLORIDE 10 MG: 5 TABLET ORAL at 16:02

## 2019-01-01 RX ADMIN — AMIODARONE HYDROCHLORIDE 100 MG: 200 TABLET ORAL at 18:10

## 2019-01-01 RX ADMIN — MAGNESIUM SULFATE HEPTAHYDRATE 1 G: 1 INJECTION, SOLUTION INTRAVENOUS at 05:52

## 2019-01-01 RX ADMIN — CEFEPIME 2 G: 2 INJECTION, POWDER, FOR SOLUTION INTRAVENOUS at 09:31

## 2019-01-01 RX ADMIN — PHYTONADIONE 10 MG: 10 INJECTION, EMULSION INTRAMUSCULAR; INTRAVENOUS; SUBCUTANEOUS at 11:50

## 2019-01-01 RX ADMIN — BUDESONIDE 500 MCG: 0.5 INHALANT RESPIRATORY (INHALATION) at 20:13

## 2019-01-01 RX ADMIN — VANCOMYCIN HYDROCHLORIDE 1500 MG: 10 INJECTION, POWDER, LYOPHILIZED, FOR SOLUTION INTRAVENOUS at 08:30

## 2019-01-01 RX ADMIN — ALBUTEROL SULFATE 2.5 MG: 2.5 SOLUTION RESPIRATORY (INHALATION) at 10:22

## 2019-01-01 RX ADMIN — BUMETANIDE 1 MG/HR: 0.25 INJECTION INTRAMUSCULAR; INTRAVENOUS at 23:35

## 2019-01-01 RX ADMIN — PIPERACILLIN SODIUM AND TAZOBACTAM SODIUM 3.38 G: 3; .375 INJECTION, POWDER, LYOPHILIZED, FOR SOLUTION INTRAVENOUS at 08:54

## 2019-01-01 RX ADMIN — METOCLOPRAMIDE 5 MG: 5 INJECTION, SOLUTION INTRAMUSCULAR; INTRAVENOUS at 18:48

## 2019-01-01 RX ADMIN — PIPERACILLIN SODIUM AND TAZOBACTAM SODIUM 3.38 G: 3; .375 INJECTION, POWDER, LYOPHILIZED, FOR SOLUTION INTRAVENOUS at 18:37

## 2019-01-01 RX ADMIN — BUMETANIDE 2 MG: 0.25 INJECTION INTRAMUSCULAR; INTRAVENOUS at 18:00

## 2019-01-01 RX ADMIN — Medication 40 ML: at 07:23

## 2019-01-01 RX ADMIN — ALBUMIN (HUMAN) 12.5 G: 0.25 INJECTION, SOLUTION INTRAVENOUS at 17:38

## 2019-01-01 RX ADMIN — PANTOPRAZOLE SODIUM 40 MG: 40 TABLET, DELAYED RELEASE ORAL at 07:00

## 2019-01-01 RX ADMIN — THERA TABS 1 TABLET: TAB at 10:39

## 2019-01-01 RX ADMIN — IPRATROPIUM BROMIDE AND ALBUTEROL SULFATE 3 ML: .5; 3 SOLUTION RESPIRATORY (INHALATION) at 05:21

## 2019-01-01 RX ADMIN — SUFENTANIL CITRATE 30 MCG: 50 INJECTION EPIDURAL; INTRAVENOUS at 08:01

## 2019-01-01 RX ADMIN — INSULIN LISPRO 2 UNITS: 100 INJECTION, SOLUTION INTRAVENOUS; SUBCUTANEOUS at 07:48

## 2019-01-01 RX ADMIN — INSULIN LISPRO 2 UNITS: 100 INJECTION, SOLUTION INTRAVENOUS; SUBCUTANEOUS at 17:07

## 2019-01-01 RX ADMIN — SILDENAFIL 40 MG: 20 TABLET ORAL at 09:56

## 2019-01-01 RX ADMIN — BUMETANIDE 1 MG/HR: 0.25 INJECTION INTRAMUSCULAR; INTRAVENOUS at 10:20

## 2019-01-01 RX ADMIN — DRONABINOL 2.5 MG: 2.5 CAPSULE ORAL at 07:05

## 2019-01-01 RX ADMIN — SALINE NASAL SPRAY 2 SPRAY: 1.5 SOLUTION NASAL at 17:39

## 2019-01-01 RX ADMIN — THERA TABS 1 TABLET: TAB at 09:13

## 2019-01-01 RX ADMIN — CHLORHEXIDINE GLUCONATE 10 ML: 1.2 RINSE ORAL at 14:13

## 2019-01-01 RX ADMIN — AMIODARONE HYDROCHLORIDE 100 MG: 200 TABLET ORAL at 08:45

## 2019-01-01 RX ADMIN — EPOETIN ALFA-EPBX 10000 UNITS: 10000 INJECTION, SOLUTION INTRAVENOUS; SUBCUTANEOUS at 21:07

## 2019-01-01 RX ADMIN — MIDAZOLAM 1 MG: 1 INJECTION INTRAMUSCULAR; INTRAVENOUS at 01:52

## 2019-01-01 RX ADMIN — PROPOFOL 25 MCG/KG/MIN: 10 INJECTION, EMULSION INTRAVENOUS at 11:45

## 2019-01-01 RX ADMIN — BUMETANIDE 1 MG: 0.25 INJECTION INTRAMUSCULAR; INTRAVENOUS at 22:54

## 2019-01-01 RX ADMIN — SILDENAFIL CITRATE 20 MG: 20 TABLET ORAL at 08:51

## 2019-01-01 RX ADMIN — POTASSIUM CHLORIDE 60 MEQ: 750 TABLET, FILM COATED, EXTENDED RELEASE ORAL at 21:50

## 2019-01-01 RX ADMIN — LIDOCAINE HYDROCHLORIDE: 20 JELLY TOPICAL at 12:39

## 2019-01-01 RX ADMIN — CASTOR OIL AND BALSAM, PERU: 788; 87 OINTMENT TOPICAL at 08:41

## 2019-01-01 RX ADMIN — DIGOXIN 0.25 MG: 250 TABLET ORAL at 08:43

## 2019-01-01 RX ADMIN — DRONABINOL 2.5 MG: 2.5 CAPSULE ORAL at 09:31

## 2019-01-01 RX ADMIN — SUCRALFATE 1 G: 1 TABLET ORAL at 12:34

## 2019-01-01 RX ADMIN — OXYCODONE 5 MG: 5 TABLET ORAL at 16:44

## 2019-01-01 RX ADMIN — SODIUM CHLORIDE: 900 INJECTION, SOLUTION INTRAVENOUS at 15:41

## 2019-01-01 RX ADMIN — CEFEPIME 2 G: 2 INJECTION, POWDER, FOR SOLUTION INTRAVENOUS at 12:15

## 2019-01-01 RX ADMIN — MAGNESIUM OXIDE TAB 400 MG (241.3 MG ELEMENTAL MG) 400 MG: 400 (241.3 MG) TAB at 16:46

## 2019-01-01 RX ADMIN — SILDENAFIL 40 MG: 20 TABLET ORAL at 17:50

## 2019-01-01 RX ADMIN — DRONABINOL 2.5 MG: 2.5 CAPSULE ORAL at 09:25

## 2019-01-01 RX ADMIN — ASPIRIN 81 MG 81 MG: 81 TABLET ORAL at 09:25

## 2019-01-01 RX ADMIN — IPRATROPIUM BROMIDE AND ALBUTEROL SULFATE 3 ML: .5; 3 SOLUTION RESPIRATORY (INHALATION) at 23:35

## 2019-01-01 RX ADMIN — OXYCODONE HYDROCHLORIDE 10 MG: 5 TABLET ORAL at 16:47

## 2019-01-01 RX ADMIN — ALBUMIN (HUMAN) 12.5 G: 0.25 INJECTION, SOLUTION INTRAVENOUS at 18:12

## 2019-01-01 RX ADMIN — OCTREOTIDE ACETATE 25 MCG: 50 INJECTION, SOLUTION INTRAVENOUS; SUBCUTANEOUS at 17:09

## 2019-01-01 RX ADMIN — OXYCODONE HYDROCHLORIDE 5 MG: 5 TABLET ORAL at 22:58

## 2019-01-01 RX ADMIN — BUDESONIDE 500 MCG: 0.5 INHALANT RESPIRATORY (INHALATION) at 08:43

## 2019-01-01 RX ADMIN — PANTOPRAZOLE SODIUM 40 MG: 40 TABLET, DELAYED RELEASE ORAL at 07:20

## 2019-01-01 RX ADMIN — SALINE NASAL SPRAY 2 SPRAY: 1.5 SOLUTION NASAL at 21:17

## 2019-01-01 RX ADMIN — BUDESONIDE 500 MCG: 0.5 INHALANT RESPIRATORY (INHALATION) at 08:21

## 2019-01-01 RX ADMIN — SUCRALFATE 1 G: 1 TABLET ORAL at 16:55

## 2019-01-01 RX ADMIN — AMIODARONE HYDROCHLORIDE 100 MG: 200 TABLET ORAL at 18:43

## 2019-01-01 RX ADMIN — CASTOR OIL AND BALSAM, PERU: 788; 87 OINTMENT TOPICAL at 22:00

## 2019-01-01 RX ADMIN — Medication 1 CAPSULE: at 08:04

## 2019-01-01 RX ADMIN — POTASSIUM CHLORIDE 20 MEQ: 400 INJECTION, SOLUTION INTRAVENOUS at 19:37

## 2019-01-01 RX ADMIN — MAGNESIUM OXIDE TAB 400 MG (241.3 MG ELEMENTAL MG) 400 MG: 400 (241.3 MG) TAB at 18:19

## 2019-01-01 RX ADMIN — MILRINONE LACTATE IN DEXTROSE 0.38 MCG/KG/MIN: 200 INJECTION, SOLUTION INTRAVENOUS at 12:21

## 2019-01-01 RX ADMIN — AMIODARONE HYDROCHLORIDE 200 MG: 200 TABLET ORAL at 20:18

## 2019-01-01 RX ADMIN — DRONABINOL 2.5 MG: 2.5 CAPSULE ORAL at 09:13

## 2019-01-01 RX ADMIN — POTASSIUM CHLORIDE 20 MEQ: 29.8 INJECTION, SOLUTION INTRAVENOUS at 06:46

## 2019-01-01 RX ADMIN — BIVALIRUDIN 0.05 MG/KG/HR: 250 INJECTION, POWDER, LYOPHILIZED, FOR SOLUTION INTRAVENOUS at 09:00

## 2019-01-01 RX ADMIN — WARFARIN SODIUM 3 MG: 1 TABLET ORAL at 17:49

## 2019-01-01 RX ADMIN — SENNOSIDES AND DOCUSATE SODIUM 1 TABLET: 8.6; 5 TABLET ORAL at 18:43

## 2019-01-01 RX ADMIN — CASTOR OIL AND BALSAM, PERU: 788; 87 OINTMENT TOPICAL at 22:23

## 2019-01-01 RX ADMIN — POLYETHYLENE GLYCOL 3350 17 G: 17 POWDER, FOR SOLUTION ORAL at 10:30

## 2019-01-01 RX ADMIN — MORPHINE SULFATE 2 MG: 2 INJECTION, SOLUTION INTRAMUSCULAR; INTRAVENOUS at 15:33

## 2019-01-01 RX ADMIN — SUCRALFATE 1 G: 1 TABLET ORAL at 06:30

## 2019-01-01 RX ADMIN — MAGNESIUM SULFATE HEPTAHYDRATE 1 G: 1 INJECTION, SOLUTION INTRAVENOUS at 05:48

## 2019-01-01 RX ADMIN — METHYLENE BLUE 50 ML: 5 INJECTION INTRAVENOUS at 11:37

## 2019-01-01 RX ADMIN — DRONABINOL 2.5 MG: 2.5 CAPSULE ORAL at 08:43

## 2019-01-01 RX ADMIN — MILRINONE LACTATE IN DEXTROSE 0.3 MCG/KG/MIN: 200 INJECTION, SOLUTION INTRAVENOUS at 19:43

## 2019-01-01 RX ADMIN — MIRTAZAPINE 15 MG: 15 TABLET, FILM COATED ORAL at 21:30

## 2019-01-01 RX ADMIN — AMIODARONE HYDROCHLORIDE 100 MG: 200 TABLET ORAL at 18:47

## 2019-01-01 RX ADMIN — SILDENAFIL CITRATE 20 MG: 20 TABLET, FILM COATED ORAL at 02:54

## 2019-01-01 RX ADMIN — Medication 10 ML: at 06:33

## 2019-01-01 RX ADMIN — OXYMETAZOLINE HYDROCHLORIDE 2 SPRAY: 0.05 SPRAY NASAL at 08:54

## 2019-01-01 RX ADMIN — SILDENAFIL 40 MG: 20 TABLET ORAL at 17:42

## 2019-01-01 RX ADMIN — Medication 10 ML: at 06:14

## 2019-01-01 RX ADMIN — HYDRALAZINE HYDROCHLORIDE 10 MG: 10 TABLET, FILM COATED ORAL at 21:07

## 2019-01-01 RX ADMIN — BIVALIRUDIN 0.22 MG/KG/HR: 250 INJECTION, POWDER, LYOPHILIZED, FOR SOLUTION INTRAVENOUS at 14:47

## 2019-01-01 RX ADMIN — SILDENAFIL 40 MG: 20 TABLET ORAL at 21:05

## 2019-01-01 RX ADMIN — MILRINONE LACTATE IN DEXTROSE 0.2 MCG/KG/MIN: 200 INJECTION, SOLUTION INTRAVENOUS at 16:48

## 2019-01-01 RX ADMIN — DIPHENHYDRAMINE HYDROCHLORIDE 25 MG: 25 CAPSULE ORAL at 00:00

## 2019-01-01 RX ADMIN — ASPIRIN 81 MG 81 MG: 81 TABLET ORAL at 08:54

## 2019-01-01 RX ADMIN — POTASSIUM CHLORIDE 20 MEQ: 29.8 INJECTION, SOLUTION INTRAVENOUS at 08:32

## 2019-01-01 RX ADMIN — SILDENAFIL CITRATE 20 MG: 20 TABLET ORAL at 17:32

## 2019-01-01 RX ADMIN — Medication 40 ML: at 07:20

## 2019-01-01 RX ADMIN — OCTREOTIDE ACETATE 25 MCG: 50 INJECTION, SOLUTION INTRAVENOUS; SUBCUTANEOUS at 16:45

## 2019-01-01 RX ADMIN — SILDENAFIL 40 MG: 20 TABLET ORAL at 09:18

## 2019-01-01 RX ADMIN — SPIRONOLACTONE 25 MG: 25 TABLET ORAL at 08:43

## 2019-01-01 RX ADMIN — ALLOPURINOL 100 MG: 100 TABLET ORAL at 10:38

## 2019-01-01 RX ADMIN — ALBUMIN (HUMAN) 12.5 G: 0.25 INJECTION, SOLUTION INTRAVENOUS at 10:59

## 2019-01-01 RX ADMIN — CASTOR OIL AND BALSAM, PERU: 788; 87 OINTMENT TOPICAL at 17:06

## 2019-01-01 RX ADMIN — SILDENAFIL 40 MG: 20 TABLET ORAL at 21:56

## 2019-01-01 RX ADMIN — Medication 10 ML: at 21:23

## 2019-01-01 RX ADMIN — Medication 10 ML: at 06:38

## 2019-01-01 RX ADMIN — AMIODARONE HYDROCHLORIDE 100 MG: 200 TABLET ORAL at 18:37

## 2019-01-01 RX ADMIN — LEVETIRACETAM 250 MG: 100 INJECTION, SOLUTION, CONCENTRATE INTRAVENOUS at 06:03

## 2019-01-01 RX ADMIN — CASTOR OIL AND BALSAM, PERU: 788; 87 OINTMENT TOPICAL at 09:12

## 2019-01-01 RX ADMIN — Medication 1 CAPSULE: at 08:59

## 2019-01-01 RX ADMIN — ASPIRIN 81 MG 81 MG: 81 TABLET ORAL at 09:01

## 2019-01-01 RX ADMIN — CEFEPIME 2 G: 2 INJECTION, POWDER, FOR SOLUTION INTRAVENOUS at 19:18

## 2019-01-01 RX ADMIN — FUROSEMIDE 20 MG: 10 INJECTION, SOLUTION INTRAMUSCULAR; INTRAVENOUS at 14:08

## 2019-01-01 RX ADMIN — CASTOR OIL AND BALSAM, PERU: 788; 87 OINTMENT TOPICAL at 17:10

## 2019-01-01 RX ADMIN — SILDENAFIL 40 MG: 20 TABLET ORAL at 09:16

## 2019-01-01 RX ADMIN — SUCRALFATE 1 G: 1 TABLET ORAL at 21:09

## 2019-01-01 RX ADMIN — Medication 10 ML: at 20:29

## 2019-01-01 RX ADMIN — Medication 10 ML: at 21:01

## 2019-01-01 RX ADMIN — PROPOFOL 15 MCG/KG/MIN: 10 INJECTION, EMULSION INTRAVENOUS at 23:16

## 2019-01-01 RX ADMIN — ONDANSETRON 4 MG: 2 INJECTION INTRAMUSCULAR; INTRAVENOUS at 05:30

## 2019-01-01 RX ADMIN — AMIODARONE HYDROCHLORIDE 100 MG: 200 TABLET ORAL at 09:19

## 2019-01-01 RX ADMIN — CLONAZEPAM 0.5 MG: 0.5 TABLET ORAL at 22:48

## 2019-01-01 RX ADMIN — ONDANSETRON 4 MG: 2 INJECTION INTRAMUSCULAR; INTRAVENOUS at 06:54

## 2019-01-01 RX ADMIN — STANDARDIZED SENNA CONCENTRATE AND DOCUSATE SODIUM 1 TABLET: 8.6; 5 TABLET ORAL at 08:16

## 2019-01-01 RX ADMIN — BUMETANIDE 1 MG: 0.25 INJECTION INTRAMUSCULAR; INTRAVENOUS at 08:27

## 2019-01-01 RX ADMIN — MAGNESIUM OXIDE TAB 400 MG (241.3 MG ELEMENTAL MG) 800 MG: 400 (241.3 MG) TAB at 17:09

## 2019-01-01 RX ADMIN — POTASSIUM CHLORIDE 20 MEQ: 400 INJECTION, SOLUTION INTRAVENOUS at 21:08

## 2019-01-01 RX ADMIN — OXYCODONE HYDROCHLORIDE 10 MG: 5 TABLET ORAL at 12:00

## 2019-01-01 RX ADMIN — ALBUMIN (HUMAN) 12.5 G: 12.5 INJECTION, SOLUTION INTRAVENOUS at 16:30

## 2019-01-01 RX ADMIN — HYDRALAZINE HYDROCHLORIDE 20 MG: 20 INJECTION INTRAMUSCULAR; INTRAVENOUS at 05:41

## 2019-01-01 RX ADMIN — ACETAMINOPHEN 650 MG: 325 TABLET, FILM COATED ORAL at 17:20

## 2019-01-01 RX ADMIN — CASTOR OIL AND BALSAM, PERU: 788; 87 OINTMENT TOPICAL at 17:58

## 2019-01-01 RX ADMIN — STANDARDIZED SENNA CONCENTRATE AND DOCUSATE SODIUM 1 TABLET: 8.6; 5 TABLET ORAL at 08:50

## 2019-01-01 RX ADMIN — MUPIROCIN: 20 OINTMENT TOPICAL at 08:39

## 2019-01-01 RX ADMIN — SILDENAFIL CITRATE 20 MG: 20 TABLET ORAL at 21:50

## 2019-01-01 RX ADMIN — CASTOR OIL AND BALSAM, PERU: 788; 87 OINTMENT TOPICAL at 15:07

## 2019-01-01 RX ADMIN — MAGNESIUM OXIDE TAB 400 MG (241.3 MG ELEMENTAL MG) 400 MG: 400 (241.3 MG) TAB at 08:51

## 2019-01-01 RX ADMIN — Medication 10 ML: at 17:40

## 2019-01-01 RX ADMIN — BUMETANIDE 2 MG: 0.25 INJECTION INTRAMUSCULAR; INTRAVENOUS at 13:04

## 2019-01-01 RX ADMIN — ALBUMIN (HUMAN) 12.5 G: 0.25 INJECTION, SOLUTION INTRAVENOUS at 08:16

## 2019-01-01 RX ADMIN — CLONAZEPAM 0.5 MG: 0.5 TABLET ORAL at 23:48

## 2019-01-01 RX ADMIN — ONDANSETRON 4 MG: 2 INJECTION INTRAMUSCULAR; INTRAVENOUS at 08:12

## 2019-01-01 RX ADMIN — SALINE NASAL SPRAY 2 SPRAY: 1.5 SOLUTION NASAL at 21:13

## 2019-01-01 RX ADMIN — MAGNESIUM OXIDE TAB 400 MG (241.3 MG ELEMENTAL MG) 400 MG: 400 (241.3 MG) TAB at 18:10

## 2019-01-01 RX ADMIN — PANTOPRAZOLE SODIUM 40 MG: 40 TABLET, DELAYED RELEASE ORAL at 07:02

## 2019-01-01 RX ADMIN — Medication 2 G: at 11:30

## 2019-01-01 RX ADMIN — ALBUTEROL SULFATE 2.5 MG: 2.5 SOLUTION RESPIRATORY (INHALATION) at 11:22

## 2019-01-01 RX ADMIN — INSULIN LISPRO 2 UNITS: 100 INJECTION, SOLUTION INTRAVENOUS; SUBCUTANEOUS at 17:09

## 2019-01-01 RX ADMIN — ALLOPURINOL 100 MG: 100 TABLET ORAL at 09:06

## 2019-01-01 RX ADMIN — MAGNESIUM OXIDE TAB 400 MG (241.3 MG ELEMENTAL MG) 400 MG: 400 (241.3 MG) TAB at 09:26

## 2019-01-01 RX ADMIN — MILRINONE LACTATE IN DEXTROSE 0.2 MCG/KG/MIN: 200 INJECTION, SOLUTION INTRAVENOUS at 19:08

## 2019-01-01 RX ADMIN — TAMSULOSIN HYDROCHLORIDE 0.8 MG: 0.4 CAPSULE ORAL at 09:13

## 2019-01-01 RX ADMIN — SUCRALFATE 1 G: 1 TABLET ORAL at 11:58

## 2019-01-01 RX ADMIN — SUCRALFATE 1 G: 1 TABLET ORAL at 17:04

## 2019-01-01 RX ADMIN — OXYMETAZOLINE HYDROCHLORIDE 2 SPRAY: 0.05 SPRAY NASAL at 08:44

## 2019-01-01 RX ADMIN — CASTOR OIL AND BALSAM, PERU: 788; 87 OINTMENT TOPICAL at 08:45

## 2019-01-01 RX ADMIN — SUCRALFATE 1 G: 1 TABLET ORAL at 07:10

## 2019-01-01 RX ADMIN — DEXMEDETOMIDINE HYDROCHLORIDE 0.9 MCG/KG/HR: 100 INJECTION, SOLUTION, CONCENTRATE INTRAVENOUS at 17:03

## 2019-01-01 RX ADMIN — POTASSIUM CHLORIDE 20 MEQ: 29.8 INJECTION, SOLUTION INTRAVENOUS at 11:17

## 2019-01-01 RX ADMIN — SILDENAFIL CITRATE 20 MG: 20 TABLET ORAL at 08:42

## 2019-01-01 RX ADMIN — OCTREOTIDE ACETATE 25 MCG: 50 INJECTION, SOLUTION INTRAVENOUS; SUBCUTANEOUS at 21:17

## 2019-01-01 RX ADMIN — AMIODARONE HYDROCHLORIDE 200 MG: 200 TABLET ORAL at 08:46

## 2019-01-01 RX ADMIN — STANDARDIZED SENNA CONCENTRATE AND DOCUSATE SODIUM 1 TABLET: 8.6; 5 TABLET ORAL at 08:13

## 2019-01-01 RX ADMIN — DRONABINOL 2.5 MG: 2.5 CAPSULE ORAL at 08:39

## 2019-01-01 RX ADMIN — MILRINONE LACTATE IN DEXTROSE 0.3 MCG/KG/MIN: 200 INJECTION, SOLUTION INTRAVENOUS at 18:41

## 2019-01-01 RX ADMIN — CASTOR OIL AND BALSAM, PERU: 788; 87 OINTMENT TOPICAL at 22:16

## 2019-01-01 RX ADMIN — OCTREOTIDE ACETATE 25 MCG: 50 INJECTION, SOLUTION INTRAVENOUS; SUBCUTANEOUS at 11:16

## 2019-01-01 RX ADMIN — Medication 10 ML: at 16:57

## 2019-01-01 RX ADMIN — MIDAZOLAM 1 MG: 1 INJECTION INTRAMUSCULAR; INTRAVENOUS at 07:50

## 2019-01-01 RX ADMIN — THERA TABS 1 TABLET: TAB at 08:10

## 2019-01-01 RX ADMIN — Medication 40 ML: at 14:00

## 2019-01-01 RX ADMIN — WARFARIN SODIUM 3 MG: 1 TABLET ORAL at 18:43

## 2019-01-01 RX ADMIN — CASTOR OIL AND BALSAM, PERU: 788; 87 OINTMENT TOPICAL at 09:16

## 2019-01-01 RX ADMIN — CASTOR OIL AND BALSAM, PERU: 788; 87 OINTMENT TOPICAL at 21:15

## 2019-01-01 RX ADMIN — ALBUMIN (HUMAN) 25 G: 0.25 INJECTION, SOLUTION INTRAVENOUS at 08:05

## 2019-01-01 RX ADMIN — SPIRONOLACTONE 25 MG: 25 TABLET ORAL at 09:16

## 2019-01-01 RX ADMIN — PANTOPRAZOLE SODIUM 40 MG: 40 TABLET, DELAYED RELEASE ORAL at 07:06

## 2019-01-01 RX ADMIN — SALINE NASAL SPRAY 2 SPRAY: 1.5 SOLUTION NASAL at 18:50

## 2019-01-01 RX ADMIN — CASTOR OIL AND BALSAM, PERU: 788; 87 OINTMENT TOPICAL at 08:30

## 2019-01-01 RX ADMIN — ALLOPURINOL 100 MG: 100 TABLET ORAL at 08:47

## 2019-01-01 RX ADMIN — TAMSULOSIN HYDROCHLORIDE 0.4 MG: 0.4 CAPSULE ORAL at 08:56

## 2019-01-01 RX ADMIN — AMIODARONE HYDROCHLORIDE 400 MG: 200 TABLET ORAL at 08:54

## 2019-01-01 RX ADMIN — PIPERACILLIN SODIUM AND TAZOBACTAM SODIUM 3.38 G: 3; .375 INJECTION, POWDER, LYOPHILIZED, FOR SOLUTION INTRAVENOUS at 17:12

## 2019-01-01 RX ADMIN — MAGNESIUM OXIDE TAB 400 MG (241.3 MG ELEMENTAL MG) 400 MG: 400 (241.3 MG) TAB at 17:24

## 2019-01-01 RX ADMIN — ALBUMIN (HUMAN) 12.5 G: 12.5 INJECTION, SOLUTION INTRAVENOUS at 15:44

## 2019-01-01 RX ADMIN — TAMSULOSIN HYDROCHLORIDE 0.8 MG: 0.4 CAPSULE ORAL at 08:16

## 2019-01-01 RX ADMIN — PANTOPRAZOLE SODIUM 40 MG: 40 TABLET, DELAYED RELEASE ORAL at 07:21

## 2019-01-01 RX ADMIN — SILDENAFIL CITRATE 20 MG: 20 TABLET, FILM COATED ORAL at 09:14

## 2019-01-01 RX ADMIN — ALBUMIN (HUMAN) 12.5 G: 0.25 INJECTION, SOLUTION INTRAVENOUS at 08:51

## 2019-01-01 RX ADMIN — DOCUSATE SODIUM AND SENNOSIDES 1 TABLET: 50; 8.6 TABLET, FILM COATED ORAL at 18:43

## 2019-01-01 RX ADMIN — PIPERACILLIN SODIUM AND TAZOBACTAM SODIUM 3.38 G: 3; .375 INJECTION, POWDER, LYOPHILIZED, FOR SOLUTION INTRAVENOUS at 18:50

## 2019-01-01 RX ADMIN — OXYCODONE HYDROCHLORIDE 5 MG: 5 TABLET ORAL at 07:21

## 2019-01-01 RX ADMIN — SILDENAFIL CITRATE 20 MG: 20 TABLET, FILM COATED ORAL at 17:41

## 2019-01-01 RX ADMIN — DRONABINOL 2.5 MG: 2.5 CAPSULE ORAL at 10:38

## 2019-01-01 RX ADMIN — IPRATROPIUM BROMIDE AND ALBUTEROL SULFATE 3 ML: .5; 3 SOLUTION RESPIRATORY (INHALATION) at 20:22

## 2019-01-01 RX ADMIN — INSULIN LISPRO 2 UNITS: 100 INJECTION, SOLUTION INTRAVENOUS; SUBCUTANEOUS at 13:01

## 2019-01-01 RX ADMIN — ALBUMIN (HUMAN) 12.5 G: 0.25 INJECTION, SOLUTION INTRAVENOUS at 08:41

## 2019-01-01 RX ADMIN — EPOETIN ALFA-EPBX 20000 UNITS: 10000 INJECTION, SOLUTION INTRAVENOUS; SUBCUTANEOUS at 09:24

## 2019-01-01 RX ADMIN — Medication 1 CAPSULE: at 08:43

## 2019-01-01 RX ADMIN — BUMETANIDE 2 MG: 0.25 INJECTION INTRAMUSCULAR; INTRAVENOUS at 08:51

## 2019-01-01 RX ADMIN — TAMSULOSIN HYDROCHLORIDE 0.8 MG: 0.4 CAPSULE ORAL at 08:28

## 2019-01-01 RX ADMIN — SPIRONOLACTONE 25 MG: 25 TABLET ORAL at 09:13

## 2019-01-01 RX ADMIN — LEVETIRACETAM 250 MG: 100 SOLUTION ORAL at 18:19

## 2019-01-01 RX ADMIN — SUCRALFATE 1 G: 1 TABLET ORAL at 12:26

## 2019-01-01 RX ADMIN — FINASTERIDE 5 MG: 5 TABLET, FILM COATED ORAL at 09:15

## 2019-01-01 RX ADMIN — SUCRALFATE 1 G: 1 TABLET ORAL at 16:31

## 2019-01-01 RX ADMIN — WARFARIN SODIUM 2.5 MG: 2.5 TABLET ORAL at 18:03

## 2019-01-01 RX ADMIN — CASTOR OIL AND BALSAM, PERU: 788; 87 OINTMENT TOPICAL at 16:59

## 2019-01-01 RX ADMIN — ASPIRIN 81 MG 81 MG: 81 TABLET ORAL at 09:45

## 2019-01-01 RX ADMIN — SUCRALFATE 1 G: 1 TABLET ORAL at 13:10

## 2019-01-01 RX ADMIN — SUCRALFATE 1 G: 1 TABLET ORAL at 08:43

## 2019-01-01 RX ADMIN — AMIODARONE HYDROCHLORIDE 400 MG: 200 TABLET ORAL at 08:38

## 2019-01-01 RX ADMIN — SALINE NASAL SPRAY 2 SPRAY: 1.5 SOLUTION NASAL at 17:17

## 2019-01-01 RX ADMIN — MAGNESIUM OXIDE TAB 400 MG (241.3 MG ELEMENTAL MG) 400 MG: 400 (241.3 MG) TAB at 17:41

## 2019-01-01 RX ADMIN — BUMETANIDE 0.5 MG/HR: 0.25 INJECTION INTRAMUSCULAR; INTRAVENOUS at 07:44

## 2019-01-01 RX ADMIN — IPRATROPIUM BROMIDE AND ALBUTEROL SULFATE 3 ML: .5; 3 SOLUTION RESPIRATORY (INHALATION) at 04:14

## 2019-01-01 RX ADMIN — ALLOPURINOL 100 MG: 100 TABLET ORAL at 09:18

## 2019-01-01 RX ADMIN — MAGNESIUM OXIDE TAB 400 MG (241.3 MG ELEMENTAL MG) 400 MG: 400 (241.3 MG) TAB at 08:47

## 2019-01-01 RX ADMIN — AMIODARONE HYDROCHLORIDE 0.5 MG/MIN: 50 INJECTION, SOLUTION INTRAVENOUS at 23:05

## 2019-01-01 RX ADMIN — HEPARIN SODIUM AND DEXTROSE 11 UNITS/KG/HR: 10000; 5 INJECTION INTRAVENOUS at 17:48

## 2019-01-01 RX ADMIN — ESCITALOPRAM OXALATE 10 MG: 10 TABLET ORAL at 17:42

## 2019-01-01 RX ADMIN — SODIUM CHLORIDE 300 MG: 900 INJECTION, SOLUTION INTRAVENOUS at 14:32

## 2019-01-01 RX ADMIN — CEFAZOLIN 1 G: 1 INJECTION, POWDER, FOR SOLUTION INTRAMUSCULAR; INTRAVENOUS; PARENTERAL at 08:00

## 2019-01-01 RX ADMIN — ARFORMOTEROL TARTRATE 15 MCG: 15 SOLUTION RESPIRATORY (INHALATION) at 09:34

## 2019-01-01 RX ADMIN — PROPOFOL 40 MG: 10 INJECTION, EMULSION INTRAVENOUS at 08:01

## 2019-01-01 RX ADMIN — POTASSIUM CHLORIDE 20 MEQ: 29.8 INJECTION, SOLUTION INTRAVENOUS at 07:04

## 2019-01-01 RX ADMIN — SILDENAFIL CITRATE 20 MG: 20 TABLET ORAL at 08:07

## 2019-01-01 RX ADMIN — MILRINONE LACTATE IN DEXTROSE 0.2 MCG/KG/MIN: 200 INJECTION, SOLUTION INTRAVENOUS at 19:59

## 2019-01-01 RX ADMIN — Medication 10 ML: at 15:26

## 2019-01-01 RX ADMIN — CHLORHEXIDINE GLUCONATE 10 ML: 1.2 RINSE ORAL at 09:21

## 2019-01-01 RX ADMIN — SUCRALFATE 1 G: 1 TABLET ORAL at 17:01

## 2019-01-01 RX ADMIN — IPRATROPIUM BROMIDE AND ALBUTEROL SULFATE 3 ML: .5; 3 SOLUTION RESPIRATORY (INHALATION) at 12:02

## 2019-01-01 RX ADMIN — MUPIROCIN: 20 OINTMENT TOPICAL at 08:23

## 2019-01-01 RX ADMIN — FAMOTIDINE 20 MG: 20 TABLET ORAL at 10:00

## 2019-01-01 RX ADMIN — INSULIN LISPRO 2 UNITS: 100 INJECTION, SOLUTION INTRAVENOUS; SUBCUTANEOUS at 12:22

## 2019-01-01 RX ADMIN — SILDENAFIL CITRATE 20 MG: 20 TABLET, FILM COATED ORAL at 18:15

## 2019-01-01 RX ADMIN — SUCRALFATE 1 G: 1 TABLET ORAL at 21:39

## 2019-01-01 RX ADMIN — Medication 10 ML: at 05:05

## 2019-01-01 RX ADMIN — BUMETANIDE 2 MG: 0.25 INJECTION INTRAMUSCULAR; INTRAVENOUS at 21:33

## 2019-01-01 RX ADMIN — SUCRALFATE 1 G: 1 TABLET ORAL at 15:30

## 2019-01-01 RX ADMIN — POLYETHYLENE GLYCOL 3350 17 G: 17 POWDER, FOR SOLUTION ORAL at 08:38

## 2019-01-01 RX ADMIN — ALTEPLASE 1 MG: 2.2 INJECTION, POWDER, LYOPHILIZED, FOR SOLUTION INTRAVENOUS at 23:47

## 2019-01-01 RX ADMIN — IPRATROPIUM BROMIDE AND ALBUTEROL SULFATE 3 ML: .5; 3 SOLUTION RESPIRATORY (INHALATION) at 00:31

## 2019-01-01 RX ADMIN — ALBUMIN (HUMAN) 12.5 G: 12.5 INJECTION, SOLUTION INTRAVENOUS at 15:34

## 2019-01-01 RX ADMIN — ONDANSETRON 4 MG: 2 INJECTION INTRAMUSCULAR; INTRAVENOUS at 13:02

## 2019-01-01 RX ADMIN — CASTOR OIL AND BALSAM, PERU: 788; 87 OINTMENT TOPICAL at 17:01

## 2019-01-01 RX ADMIN — CEFEPIME 2 G: 2 INJECTION, POWDER, FOR SOLUTION INTRAVENOUS at 11:04

## 2019-01-01 RX ADMIN — PROPOFOL 50 MCG/KG/MIN: 10 INJECTION, EMULSION INTRAVENOUS at 15:53

## 2019-01-01 RX ADMIN — SODIUM CHLORIDE: 900 INJECTION, SOLUTION INTRAVENOUS at 13:00

## 2019-01-01 RX ADMIN — CEFEPIME 2 G: 2 INJECTION, POWDER, FOR SOLUTION INTRAVENOUS at 03:08

## 2019-01-01 RX ADMIN — Medication 10 ML: at 13:05

## 2019-01-01 RX ADMIN — Medication 1 CAPSULE: at 18:34

## 2019-01-01 RX ADMIN — DOCUSATE SODIUM AND SENNOSIDES 1 TABLET: 50; 8.6 TABLET, FILM COATED ORAL at 17:47

## 2019-01-01 RX ADMIN — LEVETIRACETAM 250 MG: 100 SOLUTION ORAL at 07:00

## 2019-01-01 RX ADMIN — BUDESONIDE 500 MCG: 0.5 INHALANT RESPIRATORY (INHALATION) at 07:12

## 2019-01-01 RX ADMIN — IPRATROPIUM BROMIDE AND ALBUTEROL SULFATE 3 ML: .5; 3 SOLUTION RESPIRATORY (INHALATION) at 14:00

## 2019-01-01 RX ADMIN — DESMOPRESSIN ACETATE 27 MCG: 4 SOLUTION INTRAVENOUS at 12:01

## 2019-01-01 RX ADMIN — PIPERACILLIN SODIUM AND TAZOBACTAM SODIUM 3.38 G: 3; .375 INJECTION, POWDER, LYOPHILIZED, FOR SOLUTION INTRAVENOUS at 01:20

## 2019-01-01 RX ADMIN — BUDESONIDE 500 MCG: 0.5 INHALANT RESPIRATORY (INHALATION) at 10:23

## 2019-01-01 RX ADMIN — CASTOR OIL AND BALSAM, PERU: 788; 87 OINTMENT TOPICAL at 17:33

## 2019-01-01 RX ADMIN — SUCRALFATE 1 G: 1 TABLET ORAL at 21:18

## 2019-01-01 RX ADMIN — ALBUMIN HUMAN 12.5 G: 250 SOLUTION INTRAVENOUS at 18:12

## 2019-01-01 RX ADMIN — PANTOPRAZOLE SODIUM 40 MG: 40 TABLET, DELAYED RELEASE ORAL at 08:03

## 2019-01-01 RX ADMIN — WARFARIN SODIUM 2.5 MG: 2.5 TABLET ORAL at 16:05

## 2019-01-01 RX ADMIN — BUMETANIDE 2 MG: 0.25 INJECTION INTRAMUSCULAR; INTRAVENOUS at 21:30

## 2019-01-01 RX ADMIN — STANDARDIZED SENNA CONCENTRATE AND DOCUSATE SODIUM 1 TABLET: 8.6; 5 TABLET ORAL at 08:38

## 2019-01-01 RX ADMIN — Medication 10 ML: at 05:03

## 2019-01-01 RX ADMIN — SUCRALFATE 1 G: 1 TABLET ORAL at 07:00

## 2019-01-01 RX ADMIN — WARFARIN SODIUM 3 MG: 1 TABLET ORAL at 18:19

## 2019-01-01 RX ADMIN — SODIUM CHLORIDE 10 ML/HR: 450 INJECTION, SOLUTION INTRAVENOUS at 14:15

## 2019-01-01 RX ADMIN — BUDESONIDE 500 MCG: 0.5 INHALANT RESPIRATORY (INHALATION) at 19:38

## 2019-01-01 RX ADMIN — BUDESONIDE 500 MCG: 0.5 INHALANT RESPIRATORY (INHALATION) at 07:33

## 2019-01-01 RX ADMIN — BUDESONIDE 500 MCG: 0.5 INHALANT RESPIRATORY (INHALATION) at 11:16

## 2019-01-01 RX ADMIN — Medication 10 ML: at 14:05

## 2019-01-01 RX ADMIN — Medication 10 ML: at 21:47

## 2019-01-01 RX ADMIN — FENTANYL CITRATE 50 MCG: 50 INJECTION, SOLUTION INTRAMUSCULAR; INTRAVENOUS at 07:55

## 2019-01-01 RX ADMIN — OCTREOTIDE ACETATE 25 MCG: 50 INJECTION, SOLUTION INTRAVENOUS; SUBCUTANEOUS at 23:00

## 2019-01-01 RX ADMIN — ALLOPURINOL 100 MG: 100 TABLET ORAL at 08:54

## 2019-01-01 RX ADMIN — IPRATROPIUM BROMIDE AND ALBUTEROL SULFATE 3 ML: .5; 3 SOLUTION RESPIRATORY (INHALATION) at 09:35

## 2019-01-01 RX ADMIN — ALBUMIN (HUMAN) 25 G: 0.25 INJECTION, SOLUTION INTRAVENOUS at 08:51

## 2019-01-01 RX ADMIN — Medication 10 ML: at 06:43

## 2019-01-01 RX ADMIN — FINASTERIDE 5 MG: 5 TABLET, FILM COATED ORAL at 08:50

## 2019-01-01 RX ADMIN — ASPIRIN 81 MG 81 MG: 81 TABLET ORAL at 08:56

## 2019-01-01 RX ADMIN — BUMETANIDE 1 MG: 0.25 INJECTION INTRAMUSCULAR; INTRAVENOUS at 14:33

## 2019-01-01 RX ADMIN — FINASTERIDE 5 MG: 5 TABLET, FILM COATED ORAL at 08:10

## 2019-01-01 RX ADMIN — LEVETIRACETAM 125 MG: 250 TABLET ORAL at 08:38

## 2019-01-01 RX ADMIN — SODIUM CHLORIDE 40 MG: 9 INJECTION INTRAMUSCULAR; INTRAVENOUS; SUBCUTANEOUS at 09:57

## 2019-01-01 RX ADMIN — ARFORMOTEROL TARTRATE 15 MCG: 15 SOLUTION RESPIRATORY (INHALATION) at 19:30

## 2019-01-01 RX ADMIN — HYDROCORTISONE SODIUM SUCCINATE 100 MG: 100 INJECTION, POWDER, FOR SOLUTION INTRAMUSCULAR; INTRAVENOUS at 11:34

## 2019-01-01 RX ADMIN — Medication 10 ML: at 21:57

## 2019-01-01 RX ADMIN — Medication 10 ML: at 15:36

## 2019-01-01 RX ADMIN — IPRATROPIUM BROMIDE AND ALBUTEROL SULFATE 3 ML: .5; 3 SOLUTION RESPIRATORY (INHALATION) at 21:10

## 2019-01-01 RX ADMIN — CLONAZEPAM 0.5 MG: 0.5 TABLET ORAL at 14:16

## 2019-01-01 RX ADMIN — IPRATROPIUM BROMIDE AND ALBUTEROL SULFATE 3 ML: .5; 3 SOLUTION RESPIRATORY (INHALATION) at 04:05

## 2019-01-01 RX ADMIN — DOBUTAMINE IN DEXTROSE 2.5 MCG/KG/MIN: 200 INJECTION, SOLUTION INTRAVENOUS at 11:10

## 2019-01-01 RX ADMIN — CASTOR OIL AND BALSAM, PERU: 788; 87 OINTMENT TOPICAL at 08:54

## 2019-01-01 RX ADMIN — SALINE NASAL SPRAY 2 SPRAY: 1.5 SOLUTION NASAL at 09:15

## 2019-01-01 RX ADMIN — THERA TABS 1 TABLET: TAB at 08:38

## 2019-01-01 RX ADMIN — AMINOCAPROIC ACID 10 G/HR: 250 INJECTION, SOLUTION INTRAVENOUS at 08:40

## 2019-01-01 RX ADMIN — BUMETANIDE 2 MG: 0.25 INJECTION INTRAMUSCULAR; INTRAVENOUS at 08:41

## 2019-01-01 RX ADMIN — CEFAZOLIN 1 G: 1 INJECTION, POWDER, FOR SOLUTION INTRAMUSCULAR; INTRAVENOUS; PARENTERAL at 00:24

## 2019-01-01 RX ADMIN — APIXABAN 5 MG: 5 TABLET, FILM COATED ORAL at 20:18

## 2019-01-01 RX ADMIN — CASTOR OIL AND BALSAM, PERU: 788; 87 OINTMENT TOPICAL at 21:22

## 2019-01-01 RX ADMIN — OXYCODONE HYDROCHLORIDE 10 MG: 5 TABLET ORAL at 18:49

## 2019-01-01 RX ADMIN — POTASSIUM CHLORIDE 20 MEQ: 400 INJECTION, SOLUTION INTRAVENOUS at 08:18

## 2019-01-01 RX ADMIN — DRONABINOL 2.5 MG: 2.5 CAPSULE ORAL at 16:47

## 2019-01-01 RX ADMIN — ONDANSETRON 4 MG: 2 INJECTION INTRAMUSCULAR; INTRAVENOUS at 19:29

## 2019-01-01 RX ADMIN — ALLOPURINOL 100 MG: 100 TABLET ORAL at 08:55

## 2019-01-01 RX ADMIN — TAMSULOSIN HYDROCHLORIDE 0.4 MG: 0.4 CAPSULE ORAL at 08:42

## 2019-01-01 RX ADMIN — ESCITALOPRAM OXALATE 10 MG: 10 TABLET ORAL at 17:09

## 2019-01-01 RX ADMIN — AMIODARONE HYDROCHLORIDE 100 MG: 200 TABLET ORAL at 09:14

## 2019-01-01 RX ADMIN — Medication 1 CAPSULE: at 08:27

## 2019-01-01 RX ADMIN — BACITRACIN 1 PACKET: 500 OINTMENT TOPICAL at 12:14

## 2019-01-01 RX ADMIN — DRONABINOL 5 MG: 2.5 CAPSULE ORAL at 07:30

## 2019-01-01 RX ADMIN — POTASSIUM CHLORIDE 20 MEQ: 29.8 INJECTION, SOLUTION INTRAVENOUS at 09:09

## 2019-01-01 RX ADMIN — MILRINONE LACTATE IN DEXTROSE 0.38 MCG/KG/MIN: 200 INJECTION, SOLUTION INTRAVENOUS at 09:02

## 2019-01-01 RX ADMIN — BUMETANIDE 1 MG: 0.25 INJECTION INTRAMUSCULAR; INTRAVENOUS at 08:43

## 2019-01-01 RX ADMIN — THERA TABS 1 TABLET: TAB at 09:24

## 2019-01-01 RX ADMIN — CASTOR OIL AND BALSAM, PERU: 788; 87 OINTMENT TOPICAL at 10:53

## 2019-01-01 RX ADMIN — SILDENAFIL 40 MG: 20 TABLET ORAL at 21:41

## 2019-01-01 RX ADMIN — CHLORHEXIDINE GLUCONATE 10 ML: 1.2 RINSE ORAL at 12:24

## 2019-01-01 RX ADMIN — ALBUMIN (HUMAN) 12.5 G: 0.25 INJECTION, SOLUTION INTRAVENOUS at 08:58

## 2019-01-01 RX ADMIN — SODIUM CHLORIDE 1 G: 900 INJECTION, SOLUTION INTRAVENOUS at 12:18

## 2019-01-01 RX ADMIN — MILRINONE LACTATE IN DEXTROSE 0.38 MCG/KG/MIN: 200 INJECTION, SOLUTION INTRAVENOUS at 12:33

## 2019-01-01 RX ADMIN — OCTREOTIDE ACETATE 25 MCG: 50 INJECTION, SOLUTION INTRAVENOUS; SUBCUTANEOUS at 22:03

## 2019-01-01 RX ADMIN — MILRINONE LACTATE IN DEXTROSE 0.2 MCG/KG/MIN: 200 INJECTION, SOLUTION INTRAVENOUS at 16:30

## 2019-01-01 RX ADMIN — Medication 2 G: at 23:37

## 2019-01-01 RX ADMIN — CEFAZOLIN 1 G: 1 INJECTION, POWDER, FOR SOLUTION INTRAMUSCULAR; INTRAVENOUS; PARENTERAL at 16:47

## 2019-01-01 RX ADMIN — OCTREOTIDE ACETATE 25 MCG: 50 INJECTION, SOLUTION INTRAVENOUS; SUBCUTANEOUS at 08:49

## 2019-01-01 RX ADMIN — ACETAMINOPHEN 650 MG: 325 TABLET, FILM COATED ORAL at 21:22

## 2019-01-01 RX ADMIN — BUDESONIDE 500 MCG: 0.5 INHALANT RESPIRATORY (INHALATION) at 19:59

## 2019-01-01 RX ADMIN — TAMSULOSIN HYDROCHLORIDE 0.4 MG: 0.4 CAPSULE ORAL at 09:26

## 2019-01-01 RX ADMIN — BIVALIRUDIN 0.22 MG/KG/HR: 250 INJECTION, POWDER, LYOPHILIZED, FOR SOLUTION INTRAVENOUS at 17:43

## 2019-01-01 RX ADMIN — CASTOR OIL AND BALSAM, PERU: 788; 87 OINTMENT TOPICAL at 08:59

## 2019-01-01 RX ADMIN — BUMETANIDE 2 MG: 0.25 INJECTION INTRAMUSCULAR; INTRAVENOUS at 16:45

## 2019-01-01 RX ADMIN — SODIUM CHLORIDE 25 ML/HR: 900 INJECTION, SOLUTION INTRAVENOUS at 08:45

## 2019-01-01 RX ADMIN — SUCRALFATE 1 G: 1 TABLET ORAL at 07:35

## 2019-01-01 RX ADMIN — BUMETANIDE 1 MG: 0.25 INJECTION INTRAMUSCULAR; INTRAVENOUS at 10:39

## 2019-01-01 RX ADMIN — INSULIN LISPRO 2 UNITS: 100 INJECTION, SOLUTION INTRAVENOUS; SUBCUTANEOUS at 07:21

## 2019-01-01 RX ADMIN — SILDENAFIL 40 MG: 20 TABLET ORAL at 16:44

## 2019-01-01 RX ADMIN — CEFAZOLIN 1 G: 1 INJECTION, POWDER, FOR SOLUTION INTRAMUSCULAR; INTRAVENOUS; PARENTERAL at 23:53

## 2019-01-01 RX ADMIN — ARFORMOTEROL TARTRATE 15 MCG: 15 SOLUTION RESPIRATORY (INHALATION) at 20:52

## 2019-01-01 RX ADMIN — CEFAZOLIN 1 G: 1 INJECTION, POWDER, FOR SOLUTION INTRAMUSCULAR; INTRAVENOUS; PARENTERAL at 16:01

## 2019-01-01 RX ADMIN — METOCLOPRAMIDE 5 MG: 5 INJECTION, SOLUTION INTRAMUSCULAR; INTRAVENOUS at 12:35

## 2019-01-01 RX ADMIN — BIVALIRUDIN 0.22 MG/KG/HR: 250 INJECTION, POWDER, LYOPHILIZED, FOR SOLUTION INTRAVENOUS at 02:08

## 2019-01-01 RX ADMIN — Medication 30 ML: at 14:47

## 2019-01-01 RX ADMIN — BUMETANIDE 2 MG: 0.25 INJECTION INTRAMUSCULAR; INTRAVENOUS at 17:19

## 2019-01-01 RX ADMIN — HEPARIN SODIUM 2000 UNITS: 1000 INJECTION, SOLUTION INTRAVENOUS; SUBCUTANEOUS at 08:29

## 2019-01-01 RX ADMIN — LEVETIRACETAM 125 MG: 250 TABLET ORAL at 18:04

## 2019-01-01 RX ADMIN — POTASSIUM CHLORIDE 20 MEQ: 400 INJECTION, SOLUTION INTRAVENOUS at 09:32

## 2019-01-01 RX ADMIN — WARFARIN SODIUM 5 MG: 5 TABLET ORAL at 17:29

## 2019-01-01 RX ADMIN — CLONAZEPAM 0.5 MG: 0.5 TABLET ORAL at 20:32

## 2019-01-01 RX ADMIN — Medication 10 ML: at 07:17

## 2019-01-01 RX ADMIN — SUCRALFATE 1 G: 1 TABLET ORAL at 12:40

## 2019-01-01 RX ADMIN — SUCRALFATE 1 G: 1 TABLET ORAL at 06:41

## 2019-01-01 RX ADMIN — OXYCODONE HYDROCHLORIDE 10 MG: 5 TABLET ORAL at 01:39

## 2019-01-01 RX ADMIN — SPIRONOLACTONE 25 MG: 25 TABLET ORAL at 08:27

## 2019-01-01 RX ADMIN — POTASSIUM CHLORIDE 20 MEQ: 29.8 INJECTION, SOLUTION INTRAVENOUS at 08:58

## 2019-01-01 RX ADMIN — BUMETANIDE 0.5 MG/HR: 0.25 INJECTION INTRAMUSCULAR; INTRAVENOUS at 12:18

## 2019-01-01 RX ADMIN — SUCRALFATE 1 G: 1 TABLET ORAL at 17:05

## 2019-01-01 RX ADMIN — ALBUTEROL SULFATE 2.5 MG: 2.5 SOLUTION RESPIRATORY (INHALATION) at 19:59

## 2019-01-01 RX ADMIN — SODIUM CHLORIDE 300 MG: 900 INJECTION, SOLUTION INTRAVENOUS at 16:02

## 2019-01-01 RX ADMIN — INSULIN LISPRO 2 UNITS: 100 INJECTION, SOLUTION INTRAVENOUS; SUBCUTANEOUS at 11:30

## 2019-01-01 RX ADMIN — BUMETANIDE 3 MG: 0.25 INJECTION INTRAMUSCULAR; INTRAVENOUS at 21:18

## 2019-01-01 RX ADMIN — PHENYLEPHRINE HYDROCHLORIDE 80 MCG: 10 INJECTION INTRAVENOUS at 08:01

## 2019-01-01 RX ADMIN — WARFARIN SODIUM 2 MG: 2 TABLET ORAL at 18:45

## 2019-01-01 RX ADMIN — OXYCODONE HYDROCHLORIDE 10 MG: 5 TABLET ORAL at 21:18

## 2019-01-01 RX ADMIN — Medication 10 ML: at 22:39

## 2019-01-01 RX ADMIN — IPRATROPIUM BROMIDE AND ALBUTEROL SULFATE 3 ML: .5; 3 SOLUTION RESPIRATORY (INHALATION) at 14:07

## 2019-01-01 RX ADMIN — IPRATROPIUM BROMIDE AND ALBUTEROL SULFATE 3 ML: .5; 3 SOLUTION RESPIRATORY (INHALATION) at 19:14

## 2019-01-01 RX ADMIN — WARFARIN SODIUM 2 MG: 2 TABLET ORAL at 17:25

## 2019-01-01 RX ADMIN — SALINE NASAL SPRAY 2 SPRAY: 1.5 SOLUTION NASAL at 13:14

## 2019-01-01 RX ADMIN — CASTOR OIL AND BALSAM, PERU: 788; 87 OINTMENT TOPICAL at 08:11

## 2019-01-01 RX ADMIN — LEVETIRACETAM 250 MG: 100 SOLUTION ORAL at 22:23

## 2019-01-01 RX ADMIN — MAGNESIUM OXIDE TAB 400 MG (241.3 MG ELEMENTAL MG) 400 MG: 400 (241.3 MG) TAB at 17:47

## 2019-01-01 RX ADMIN — SODIUM CHLORIDE 40 MG: 9 INJECTION INTRAMUSCULAR; INTRAVENOUS; SUBCUTANEOUS at 08:26

## 2019-01-01 RX ADMIN — INSULIN LISPRO 2 UNITS: 100 INJECTION, SOLUTION INTRAVENOUS; SUBCUTANEOUS at 11:25

## 2019-01-01 RX ADMIN — SALINE NASAL SPRAY 2 SPRAY: 1.5 SOLUTION NASAL at 08:00

## 2019-01-01 RX ADMIN — MILRINONE LACTATE IN DEXTROSE 0.3 MCG/KG/MIN: 200 INJECTION, SOLUTION INTRAVENOUS at 08:39

## 2019-01-01 RX ADMIN — MILRINONE LACTATE IN DEXTROSE 0.2 MCG/KG/MIN: 200 INJECTION, SOLUTION INTRAVENOUS at 06:43

## 2019-01-01 RX ADMIN — DIGOXIN 0.06 MG: 125 TABLET ORAL at 21:14

## 2019-01-01 RX ADMIN — SUCRALFATE 1 G: 1 TABLET ORAL at 07:17

## 2019-01-01 RX ADMIN — HYDRALAZINE HYDROCHLORIDE 10 MG: 10 TABLET, FILM COATED ORAL at 21:58

## 2019-01-01 RX ADMIN — IPRATROPIUM BROMIDE AND ALBUTEROL SULFATE 3 ML: .5; 3 SOLUTION RESPIRATORY (INHALATION) at 16:49

## 2019-01-01 RX ADMIN — WARFARIN SODIUM 2 MG: 2 TABLET ORAL at 17:01

## 2019-01-01 RX ADMIN — Medication 10 ML: at 22:12

## 2019-01-01 RX ADMIN — Medication 1 CAPSULE: at 08:50

## 2019-01-01 RX ADMIN — PHENAZOPYRIDINE 100 MG: 100 TABLET ORAL at 08:10

## 2019-01-01 RX ADMIN — Medication 10 ML: at 07:20

## 2019-01-01 RX ADMIN — MIRTAZAPINE 15 MG: 15 TABLET, FILM COATED ORAL at 21:21

## 2019-01-01 RX ADMIN — OXYMETAZOLINE HYDROCHLORIDE 2 SPRAY: 0.05 SPRAY NASAL at 09:34

## 2019-01-01 RX ADMIN — SUCRALFATE 1 G: 1 TABLET ORAL at 21:56

## 2019-01-01 RX ADMIN — SODIUM CHLORIDE 10 ML/HR: 450 INJECTION, SOLUTION INTRAVENOUS at 13:21

## 2019-01-01 RX ADMIN — BUDESONIDE 500 MCG: 0.5 INHALANT RESPIRATORY (INHALATION) at 22:24

## 2019-01-01 RX ADMIN — Medication 2 G: at 16:29

## 2019-01-01 RX ADMIN — MUPIROCIN 1 G: 20 OINTMENT TOPICAL at 17:01

## 2019-01-01 RX ADMIN — SALINE NASAL SPRAY 2 SPRAY: 1.5 SOLUTION NASAL at 18:52

## 2019-01-01 RX ADMIN — SUCRALFATE 1 G: 1 TABLET ORAL at 23:15

## 2019-01-01 RX ADMIN — BUMETANIDE 1 MG: 0.25 INJECTION INTRAMUSCULAR; INTRAVENOUS at 08:04

## 2019-01-01 RX ADMIN — ALBUMIN (HUMAN) 12.5 G: 12.5 INJECTION, SOLUTION INTRAVENOUS at 09:51

## 2019-01-01 RX ADMIN — Medication 2 G: at 17:16

## 2019-01-01 RX ADMIN — SILDENAFIL CITRATE 20 MG: 20 TABLET ORAL at 21:22

## 2019-01-01 RX ADMIN — MAGNESIUM OXIDE TAB 400 MG (241.3 MG ELEMENTAL MG) 400 MG: 400 (241.3 MG) TAB at 16:05

## 2019-01-01 RX ADMIN — ALTEPLASE 1 MG: 2.2 INJECTION, POWDER, LYOPHILIZED, FOR SOLUTION INTRAVENOUS at 23:28

## 2019-01-01 RX ADMIN — Medication 10 ML: at 21:42

## 2019-01-01 RX ADMIN — CASTOR OIL AND BALSAM, PERU: 788; 87 OINTMENT TOPICAL at 17:44

## 2019-01-01 RX ADMIN — MILRINONE LACTATE IN DEXTROSE 0.38 MCG/KG/MIN: 200 INJECTION, SOLUTION INTRAVENOUS at 10:22

## 2019-01-01 RX ADMIN — PROPOFOL 75 MCG/KG/MIN: 10 INJECTION, EMULSION INTRAVENOUS at 07:50

## 2019-01-01 RX ADMIN — DIGOXIN 0.25 MG: 250 TABLET ORAL at 08:50

## 2019-01-01 RX ADMIN — MUPIROCIN: 20 OINTMENT TOPICAL at 18:02

## 2019-01-01 RX ADMIN — WARFARIN SODIUM 3 MG: 2 TABLET ORAL at 17:13

## 2019-01-01 RX ADMIN — Medication 1 PACKET: at 14:58

## 2019-01-01 RX ADMIN — SILDENAFIL 40 MG: 20 TABLET ORAL at 23:14

## 2019-01-01 RX ADMIN — AMIODARONE HYDROCHLORIDE 100 MG: 200 TABLET ORAL at 17:52

## 2019-01-01 RX ADMIN — OCTREOTIDE ACETATE 25 MCG: 50 INJECTION, SOLUTION INTRAVENOUS; SUBCUTANEOUS at 16:10

## 2019-01-01 RX ADMIN — BIVALIRUDIN 0.22 MG/KG/HR: 250 INJECTION, POWDER, LYOPHILIZED, FOR SOLUTION INTRAVENOUS at 22:30

## 2019-01-01 RX ADMIN — PANTOPRAZOLE SODIUM 40 MG: 40 TABLET, DELAYED RELEASE ORAL at 07:08

## 2019-01-01 RX ADMIN — Medication 10 ML: at 21:15

## 2019-01-01 RX ADMIN — Medication 120 MCG: at 07:55

## 2019-01-01 RX ADMIN — ALTEPLASE 1 MG: 2.2 INJECTION, POWDER, LYOPHILIZED, FOR SOLUTION INTRAVENOUS at 12:04

## 2019-01-01 RX ADMIN — BUMETANIDE 2 MG: 0.25 INJECTION INTRAMUSCULAR; INTRAVENOUS at 12:09

## 2019-01-01 RX ADMIN — INSULIN LISPRO 2 UNITS: 100 INJECTION, SOLUTION INTRAVENOUS; SUBCUTANEOUS at 17:54

## 2019-01-01 RX ADMIN — MAGNESIUM OXIDE TAB 400 MG (241.3 MG ELEMENTAL MG) 400 MG: 400 (241.3 MG) TAB at 17:16

## 2019-01-01 RX ADMIN — Medication 10 ML: at 05:52

## 2019-01-01 RX ADMIN — MORPHINE SULFATE 4 MG: 4 INJECTION, SOLUTION INTRAMUSCULAR; INTRAVENOUS at 04:15

## 2019-01-01 RX ADMIN — TAMSULOSIN HYDROCHLORIDE 0.4 MG: 0.4 CAPSULE ORAL at 09:23

## 2019-01-01 RX ADMIN — INSULIN LISPRO 2 UNITS: 100 INJECTION, SOLUTION INTRAVENOUS; SUBCUTANEOUS at 12:36

## 2019-01-01 RX ADMIN — BUMETANIDE 2 MG: 0.25 INJECTION INTRAMUSCULAR; INTRAVENOUS at 21:07

## 2019-01-01 RX ADMIN — SILDENAFIL CITRATE 20 MG: 20 TABLET ORAL at 21:09

## 2019-01-01 RX ADMIN — MAGNESIUM OXIDE TAB 400 MG (241.3 MG ELEMENTAL MG) 400 MG: 400 (241.3 MG) TAB at 17:42

## 2019-01-01 RX ADMIN — MORPHINE SULFATE 4 MG: 4 INJECTION, SOLUTION INTRAMUSCULAR; INTRAVENOUS at 14:21

## 2019-01-01 RX ADMIN — POLYETHYLENE GLYCOL 3350 17 G: 17 POWDER, FOR SOLUTION ORAL at 08:11

## 2019-01-01 RX ADMIN — OXYCODONE HYDROCHLORIDE 10 MG: 5 TABLET ORAL at 21:23

## 2019-01-01 RX ADMIN — SUCRALFATE 1 G: 1 TABLET ORAL at 17:45

## 2019-01-01 RX ADMIN — MAGNESIUM OXIDE TAB 400 MG (241.3 MG ELEMENTAL MG) 400 MG: 400 (241.3 MG) TAB at 08:27

## 2019-01-01 RX ADMIN — MAGNESIUM OXIDE TAB 400 MG (241.3 MG ELEMENTAL MG) 400 MG: 400 (241.3 MG) TAB at 19:05

## 2019-01-01 RX ADMIN — MILRINONE LACTATE IN DEXTROSE 0.38 MCG/KG/MIN: 200 INJECTION, SOLUTION INTRAVENOUS at 14:59

## 2019-01-01 RX ADMIN — SILDENAFIL CITRATE 20 MG: 20 TABLET ORAL at 21:49

## 2019-01-01 RX ADMIN — MILRINONE LACTATE IN DEXTROSE 0.1 MCG/KG/MIN: 200 INJECTION, SOLUTION INTRAVENOUS at 10:57

## 2019-01-01 RX ADMIN — PIPERACILLIN SODIUM AND TAZOBACTAM SODIUM 3.38 G: 3; .375 INJECTION, POWDER, LYOPHILIZED, FOR SOLUTION INTRAVENOUS at 00:06

## 2019-01-01 RX ADMIN — AMIODARONE HYDROCHLORIDE 100 MG: 200 TABLET ORAL at 09:25

## 2019-01-01 RX ADMIN — Medication 10 ML: at 05:53

## 2019-01-01 RX ADMIN — SILDENAFIL CITRATE 20 MG: 20 TABLET ORAL at 16:06

## 2019-01-01 RX ADMIN — DOCUSATE SODIUM AND SENNOSIDES 1 TABLET: 50; 8.6 TABLET, FILM COATED ORAL at 19:05

## 2019-01-01 RX ADMIN — CHLORHEXIDINE GLUCONATE 10 ML: 1.2 RINSE ORAL at 09:32

## 2019-01-01 RX ADMIN — Medication 10 ML: at 07:01

## 2019-01-01 RX ADMIN — TAMSULOSIN HYDROCHLORIDE 0.4 MG: 0.4 CAPSULE ORAL at 08:08

## 2019-01-01 RX ADMIN — SUCRALFATE 1 G: 1 TABLET ORAL at 06:08

## 2019-01-01 RX ADMIN — POTASSIUM CHLORIDE 20 MEQ: 750 TABLET, FILM COATED, EXTENDED RELEASE ORAL at 16:30

## 2019-01-01 RX ADMIN — Medication 2 G: at 11:45

## 2019-01-01 RX ADMIN — Medication 1 CAPSULE: at 08:10

## 2019-01-01 RX ADMIN — CEFEPIME HYDROCHLORIDE 2 G: 2 INJECTION, POWDER, FOR SOLUTION INTRAVENOUS at 15:40

## 2019-01-01 RX ADMIN — DOCUSATE SODIUM AND SENNOSIDES 1 TABLET: 50; 8.6 TABLET, FILM COATED ORAL at 18:37

## 2019-01-01 RX ADMIN — SILDENAFIL CITRATE 20 MG: 20 TABLET, FILM COATED ORAL at 19:40

## 2019-01-01 RX ADMIN — SUCRALFATE 1 G: 1 TABLET ORAL at 07:18

## 2019-01-01 RX ADMIN — POTASSIUM CHLORIDE 60 MEQ: 750 TABLET, FILM COATED, EXTENDED RELEASE ORAL at 17:45

## 2019-01-01 RX ADMIN — ACETAMINOPHEN 650 MG: 325 TABLET, FILM COATED ORAL at 16:41

## 2019-01-01 RX ADMIN — Medication 10 ML: at 07:23

## 2019-01-01 RX ADMIN — CASTOR OIL AND BALSAM, PERU: 788; 87 OINTMENT TOPICAL at 23:40

## 2019-01-01 RX ADMIN — SENNOSIDES AND DOCUSATE SODIUM 1 TABLET: 8.6; 5 TABLET ORAL at 08:38

## 2019-01-01 RX ADMIN — SALINE NASAL SPRAY 2 SPRAY: 1.5 SOLUTION NASAL at 21:31

## 2019-01-01 RX ADMIN — CHLORHEXIDINE GLUCONATE 10 ML: 1.2 RINSE ORAL at 17:14

## 2019-01-01 RX ADMIN — CLONAZEPAM 0.5 MG: 0.5 TABLET ORAL at 13:42

## 2019-01-01 RX ADMIN — BUMETANIDE 0.5 MG/HR: 0.25 INJECTION INTRAMUSCULAR; INTRAVENOUS at 06:17

## 2019-01-01 RX ADMIN — SALINE NASAL SPRAY 2 SPRAY: 1.5 SOLUTION NASAL at 22:04

## 2019-01-01 RX ADMIN — SODIUM CHLORIDE 300 MG: 900 INJECTION, SOLUTION INTRAVENOUS at 14:58

## 2019-01-01 RX ADMIN — OCTREOTIDE ACETATE 25 MCG: 50 INJECTION, SOLUTION INTRAVENOUS; SUBCUTANEOUS at 23:40

## 2019-01-01 RX ADMIN — LIDOCAINE HYDROCHLORIDE 100 MG: 20 INJECTION, SOLUTION EPIDURAL; INFILTRATION; INTRACAUDAL; PERINEURAL at 09:28

## 2019-01-01 RX ADMIN — DRONABINOL 2.5 MG: 2.5 CAPSULE ORAL at 06:43

## 2019-01-01 RX ADMIN — SUCRALFATE 1 G: 1 TABLET ORAL at 21:05

## 2019-01-01 RX ADMIN — SODIUM CHLORIDE: 900 INJECTION, SOLUTION INTRAVENOUS at 07:30

## 2019-01-01 RX ADMIN — BUMETANIDE 2 MG: 0.25 INJECTION INTRAMUSCULAR; INTRAVENOUS at 09:10

## 2019-01-01 RX ADMIN — SUCRALFATE 1 G: 1 TABLET ORAL at 11:06

## 2019-01-01 RX ADMIN — CASTOR OIL AND BALSAM, PERU: 788; 87 OINTMENT TOPICAL at 22:05

## 2019-01-01 RX ADMIN — OXYCODONE HYDROCHLORIDE 5 MG: 5 TABLET ORAL at 14:29

## 2019-01-01 RX ADMIN — POTASSIUM CHLORIDE 20 MEQ: 400 INJECTION, SOLUTION INTRAVENOUS at 10:32

## 2019-01-01 RX ADMIN — Medication 10 ML: at 05:41

## 2019-01-01 RX ADMIN — Medication 2 G: at 09:53

## 2019-01-01 RX ADMIN — POTASSIUM CHLORIDE 10 MEQ: 10 INJECTION, SOLUTION INTRAVENOUS at 06:22

## 2019-01-01 RX ADMIN — Medication 2 G: at 20:00

## 2019-01-01 RX ADMIN — LEVETIRACETAM 125 MG: 250 TABLET ORAL at 08:50

## 2019-01-01 RX ADMIN — LEVETIRACETAM 250 MG: 100 INJECTION, SOLUTION, CONCENTRATE INTRAVENOUS at 07:23

## 2019-01-01 RX ADMIN — ALBUMIN (HUMAN) 12.5 G: 0.25 INJECTION, SOLUTION INTRAVENOUS at 09:30

## 2019-01-01 RX ADMIN — MILRINONE LACTATE IN DEXTROSE 0.4 MCG/KG/MIN: 200 INJECTION, SOLUTION INTRAVENOUS at 09:45

## 2019-01-01 RX ADMIN — WATER 1 MG: 1 INJECTION INTRAMUSCULAR; INTRAVENOUS; SUBCUTANEOUS at 23:55

## 2019-01-01 RX ADMIN — IPRATROPIUM BROMIDE AND ALBUTEROL SULFATE 3 ML: .5; 3 SOLUTION RESPIRATORY (INHALATION) at 11:13

## 2019-01-01 RX ADMIN — IPRATROPIUM BROMIDE AND ALBUTEROL SULFATE 3 ML: .5; 3 SOLUTION RESPIRATORY (INHALATION) at 12:03

## 2019-01-01 RX ADMIN — POTASSIUM CHLORIDE 40 MEQ: 750 TABLET, FILM COATED, EXTENDED RELEASE ORAL at 06:17

## 2019-01-01 RX ADMIN — Medication 10 ML: at 17:25

## 2019-01-01 RX ADMIN — Medication 1 CAPSULE: at 09:15

## 2019-01-01 RX ADMIN — IPRATROPIUM BROMIDE AND ALBUTEROL SULFATE 3 ML: .5; 3 SOLUTION RESPIRATORY (INHALATION) at 19:51

## 2019-01-01 RX ADMIN — PANTOPRAZOLE SODIUM 40 MG: 40 TABLET, DELAYED RELEASE ORAL at 06:50

## 2019-01-01 RX ADMIN — MAGNESIUM OXIDE TAB 400 MG (241.3 MG ELEMENTAL MG) 400 MG: 400 (241.3 MG) TAB at 09:14

## 2019-01-01 RX ADMIN — DEXMEDETOMIDINE HYDROCHLORIDE 0.6 MCG/KG/HR: 100 INJECTION, SOLUTION, CONCENTRATE INTRAVENOUS at 12:02

## 2019-01-01 RX ADMIN — MILRINONE LACTATE IN DEXTROSE 0.4 MCG/KG/MIN: 200 INJECTION, SOLUTION INTRAVENOUS at 06:41

## 2019-01-01 RX ADMIN — CASTOR OIL AND BALSAM, PERU: 788; 87 OINTMENT TOPICAL at 10:24

## 2019-01-01 RX ADMIN — ARFORMOTEROL TARTRATE 15 MCG: 15 SOLUTION RESPIRATORY (INHALATION) at 08:23

## 2019-01-01 RX ADMIN — WARFARIN SODIUM 2 MG: 2 TABLET ORAL at 02:02

## 2019-01-01 RX ADMIN — CEFEPIME 2 G: 2 INJECTION, POWDER, FOR SOLUTION INTRAVENOUS at 09:49

## 2019-01-01 RX ADMIN — CEFEPIME HYDROCHLORIDE 2 G: 2 INJECTION, POWDER, FOR SOLUTION INTRAVENOUS at 15:51

## 2019-01-01 RX ADMIN — IPRATROPIUM BROMIDE AND ALBUTEROL SULFATE 3 ML: .5; 3 SOLUTION RESPIRATORY (INHALATION) at 16:32

## 2019-01-01 RX ADMIN — CHLORHEXIDINE GLUCONATE 10 ML: 1.2 RINSE ORAL at 08:47

## 2019-01-01 RX ADMIN — OXYCODONE HYDROCHLORIDE 5 MG: 5 TABLET ORAL at 06:36

## 2019-01-01 RX ADMIN — ACETAMINOPHEN 650 MG: 325 TABLET, FILM COATED ORAL at 13:25

## 2019-01-01 RX ADMIN — INSULIN LISPRO 2 UNITS: 100 INJECTION, SOLUTION INTRAVENOUS; SUBCUTANEOUS at 16:39

## 2019-01-01 RX ADMIN — BUMETANIDE 2 MG: 0.25 INJECTION INTRAMUSCULAR; INTRAVENOUS at 17:01

## 2019-01-01 RX ADMIN — SENNOSIDES AND DOCUSATE SODIUM 1 TABLET: 8.6; 5 TABLET ORAL at 09:14

## 2019-01-01 RX ADMIN — OXYCODONE HYDROCHLORIDE 10 MG: 5 TABLET ORAL at 06:25

## 2019-01-01 RX ADMIN — SODIUM CHLORIDE 6 ML/HR: 900 INJECTION, SOLUTION INTRAVENOUS at 15:10

## 2019-01-01 RX ADMIN — IPRATROPIUM BROMIDE AND ALBUTEROL SULFATE 3 ML: .5; 3 SOLUTION RESPIRATORY (INHALATION) at 00:28

## 2019-01-01 RX ADMIN — MAGNESIUM OXIDE TAB 400 MG (241.3 MG ELEMENTAL MG) 400 MG: 400 (241.3 MG) TAB at 08:16

## 2019-01-01 RX ADMIN — POTASSIUM CHLORIDE 20 MEQ: 29.8 INJECTION, SOLUTION INTRAVENOUS at 09:45

## 2019-01-01 RX ADMIN — BUMETANIDE 0.5 MG/HR: 0.25 INJECTION INTRAMUSCULAR; INTRAVENOUS at 04:24

## 2019-01-01 RX ADMIN — SUCRALFATE 1 G: 1 TABLET ORAL at 07:21

## 2019-01-01 RX ADMIN — BUMETANIDE 2 MG: 0.25 INJECTION INTRAMUSCULAR; INTRAVENOUS at 08:09

## 2019-01-01 RX ADMIN — POTASSIUM CHLORIDE 40 MEQ: 750 TABLET, FILM COATED, EXTENDED RELEASE ORAL at 09:10

## 2019-01-01 RX ADMIN — CHLORHEXIDINE GLUCONATE 10 ML: 1.2 RINSE ORAL at 09:20

## 2019-01-01 RX ADMIN — IPRATROPIUM BROMIDE AND ALBUTEROL SULFATE 3 ML: .5; 3 SOLUTION RESPIRATORY (INHALATION) at 23:10

## 2019-01-01 RX ADMIN — SILDENAFIL 40 MG: 20 TABLET ORAL at 15:25

## 2019-01-01 RX ADMIN — OXYCODONE HYDROCHLORIDE 5 MG: 5 TABLET ORAL at 09:29

## 2019-01-01 RX ADMIN — CHLORHEXIDINE GLUCONATE 10 ML: 1.2 RINSE ORAL at 09:54

## 2019-01-01 RX ADMIN — Medication 2 G: at 09:39

## 2019-01-01 RX ADMIN — CEFAZOLIN 1 G: 1 INJECTION, POWDER, FOR SOLUTION INTRAMUSCULAR; INTRAVENOUS; PARENTERAL at 17:14

## 2019-01-01 RX ADMIN — CASTOR OIL AND BALSAM, PERU: 788; 87 OINTMENT TOPICAL at 15:39

## 2019-01-01 RX ADMIN — SALINE NASAL SPRAY 2 SPRAY: 1.5 SOLUTION NASAL at 19:35

## 2019-01-01 RX ADMIN — SUCRALFATE 1 G: 1 TABLET ORAL at 11:17

## 2019-01-01 RX ADMIN — SILDENAFIL 40 MG: 20 TABLET ORAL at 08:43

## 2019-01-01 RX ADMIN — BUMETANIDE 1 MG: 0.25 INJECTION INTRAMUSCULAR; INTRAVENOUS at 22:23

## 2019-01-01 RX ADMIN — SPIRONOLACTONE 25 MG: 25 TABLET ORAL at 08:10

## 2019-01-01 RX ADMIN — SALINE NASAL SPRAY 2 SPRAY: 1.5 SOLUTION NASAL at 16:44

## 2019-01-01 RX ADMIN — SUCRALFATE 1 G: 1 TABLET ORAL at 12:30

## 2019-01-01 RX ADMIN — TAMSULOSIN HYDROCHLORIDE 0.8 MG: 0.4 CAPSULE ORAL at 08:45

## 2019-01-01 RX ADMIN — OXYCODONE HYDROCHLORIDE 5 MG: 5 TABLET ORAL at 08:19

## 2019-01-01 RX ADMIN — SUCRALFATE 1 G: 1 TABLET ORAL at 21:25

## 2019-01-01 RX ADMIN — FLUCONAZOLE 200 MG: 200 INJECTION, SOLUTION INTRAVENOUS at 04:53

## 2019-01-01 RX ADMIN — POTASSIUM CHLORIDE 40 MEQ: 750 TABLET, FILM COATED, EXTENDED RELEASE ORAL at 08:43

## 2019-01-01 RX ADMIN — DRONABINOL 2.5 MG: 2.5 CAPSULE ORAL at 06:37

## 2019-01-01 RX ADMIN — SALINE NASAL SPRAY 2 SPRAY: 1.5 SOLUTION NASAL at 10:34

## 2019-01-01 RX ADMIN — ALBUMIN (HUMAN) 25 G: 12.5 INJECTION, SOLUTION INTRAVENOUS at 14:43

## 2019-01-01 RX ADMIN — SUCRALFATE 1 G: 1 TABLET ORAL at 15:51

## 2019-01-01 RX ADMIN — DRONABINOL 2.5 MG: 2.5 CAPSULE ORAL at 09:18

## 2019-01-01 RX ADMIN — BUMETANIDE 2 MG: 0.25 INJECTION INTRAMUSCULAR; INTRAVENOUS at 18:27

## 2019-01-01 RX ADMIN — SODIUM CHLORIDE 10 ML/HR: 900 INJECTION, SOLUTION INTRAVENOUS at 06:22

## 2019-01-01 RX ADMIN — BUMETANIDE 1 MG: 0.25 INJECTION INTRAMUSCULAR; INTRAVENOUS at 09:22

## 2019-01-01 RX ADMIN — MILRINONE LACTATE IN DEXTROSE 0.38 MCG/KG/MIN: 200 INJECTION, SOLUTION INTRAVENOUS at 02:47

## 2019-01-01 RX ADMIN — EPOETIN ALFA-EPBX 20000 UNITS: 10000 INJECTION, SOLUTION INTRAVENOUS; SUBCUTANEOUS at 08:56

## 2019-01-01 RX ADMIN — INSULIN LISPRO 2 UNITS: 100 INJECTION, SOLUTION INTRAVENOUS; SUBCUTANEOUS at 13:23

## 2019-01-01 RX ADMIN — DIGOXIN 0.06 MG: 125 TABLET ORAL at 22:32

## 2019-01-01 RX ADMIN — HYDRALAZINE HYDROCHLORIDE 10 MG: 10 TABLET, FILM COATED ORAL at 21:17

## 2019-01-01 RX ADMIN — INSULIN LISPRO 2 UNITS: 100 INJECTION, SOLUTION INTRAVENOUS; SUBCUTANEOUS at 12:15

## 2019-01-01 RX ADMIN — ACETAMINOPHEN 650 MG: 325 TABLET, FILM COATED ORAL at 01:05

## 2019-01-01 RX ADMIN — BUMETANIDE 1 MG: 0.25 INJECTION INTRAMUSCULAR; INTRAVENOUS at 17:03

## 2019-01-01 RX ADMIN — INSULIN LISPRO 2 UNITS: 100 INJECTION, SOLUTION INTRAVENOUS; SUBCUTANEOUS at 12:30

## 2019-01-01 RX ADMIN — DEXTROSE MONOHYDRATE 9.1 ML/HR: 5 INJECTION, SOLUTION INTRAVENOUS at 21:17

## 2019-01-01 RX ADMIN — OXYCODONE HYDROCHLORIDE 10 MG: 5 TABLET ORAL at 09:54

## 2019-01-01 RX ADMIN — BUMETANIDE 2 MG: 0.25 INJECTION INTRAMUSCULAR; INTRAVENOUS at 22:31

## 2019-01-01 RX ADMIN — ALLOPURINOL 100 MG: 100 TABLET ORAL at 09:16

## 2019-01-01 RX ADMIN — BIVALIRUDIN 0.22 MG/KG/HR: 250 INJECTION, POWDER, LYOPHILIZED, FOR SOLUTION INTRAVENOUS at 19:43

## 2019-01-01 RX ADMIN — BUMETANIDE 1 MG/HR: 0.25 INJECTION INTRAMUSCULAR; INTRAVENOUS at 16:10

## 2019-01-01 RX ADMIN — POTASSIUM CHLORIDE 40 MEQ: 750 TABLET, FILM COATED, EXTENDED RELEASE ORAL at 09:15

## 2019-01-01 RX ADMIN — ALBUMIN (HUMAN) 12.5 G: 12.5 INJECTION, SOLUTION INTRAVENOUS at 17:56

## 2019-01-01 RX ADMIN — POTASSIUM CHLORIDE 60 MEQ: 750 TABLET, FILM COATED, EXTENDED RELEASE ORAL at 21:40

## 2019-01-01 RX ADMIN — CASTOR OIL AND BALSAM, PERU: 788; 87 OINTMENT TOPICAL at 15:25

## 2019-01-01 RX ADMIN — TAMSULOSIN HYDROCHLORIDE 0.8 MG: 0.4 CAPSULE ORAL at 09:51

## 2019-01-01 RX ADMIN — SILDENAFIL CITRATE 20 MG: 20 TABLET ORAL at 21:58

## 2019-01-01 RX ADMIN — BUMETANIDE 2 MG: 0.25 INJECTION INTRAMUSCULAR; INTRAVENOUS at 21:00

## 2019-01-01 RX ADMIN — Medication 40 ML: at 07:04

## 2019-01-01 RX ADMIN — SODIUM CHLORIDE 3 ML/HR: 900 INJECTION, SOLUTION INTRAVENOUS at 21:14

## 2019-01-01 RX ADMIN — SUCRALFATE 1 G: 1 TABLET ORAL at 12:44

## 2019-01-01 RX ADMIN — POTASSIUM CHLORIDE 60 MEQ: 750 TABLET, FILM COATED, EXTENDED RELEASE ORAL at 17:17

## 2019-01-01 RX ADMIN — BACITRACIN 1 PACKET: 500 OINTMENT TOPICAL at 14:58

## 2019-01-01 RX ADMIN — MAGNESIUM OXIDE TAB 400 MG (241.3 MG ELEMENTAL MG) 400 MG: 400 (241.3 MG) TAB at 09:51

## 2019-01-01 RX ADMIN — INSULIN LISPRO 2 UNITS: 100 INJECTION, SOLUTION INTRAVENOUS; SUBCUTANEOUS at 12:44

## 2019-01-01 RX ADMIN — LEVETIRACETAM 125 MG: 250 TABLET ORAL at 08:42

## 2019-01-01 RX ADMIN — SALINE NASAL SPRAY 2 SPRAY: 1.5 SOLUTION NASAL at 21:40

## 2019-01-01 RX ADMIN — Medication 10 ML: at 21:04

## 2019-01-01 RX ADMIN — SILDENAFIL 40 MG: 20 TABLET ORAL at 09:13

## 2019-01-01 RX ADMIN — Medication 10 ML: at 07:13

## 2019-01-01 RX ADMIN — BUMETANIDE 2 MG: 0.25 INJECTION INTRAMUSCULAR; INTRAVENOUS at 14:58

## 2019-01-01 RX ADMIN — ALLOPURINOL 100 MG: 100 TABLET ORAL at 08:27

## 2019-01-01 RX ADMIN — SILDENAFIL CITRATE 20 MG: 20 TABLET ORAL at 21:11

## 2019-01-01 RX ADMIN — SALINE NASAL SPRAY 2 SPRAY: 1.5 SOLUTION NASAL at 09:20

## 2019-01-01 RX ADMIN — STANDARDIZED SENNA CONCENTRATE AND DOCUSATE SODIUM 1 TABLET: 8.6; 5 TABLET ORAL at 09:16

## 2019-01-01 RX ADMIN — SILDENAFIL CITRATE 20 MG: 20 TABLET ORAL at 21:38

## 2019-01-01 RX ADMIN — INSULIN LISPRO 2 UNITS: 100 INJECTION, SOLUTION INTRAVENOUS; SUBCUTANEOUS at 13:07

## 2019-01-01 RX ADMIN — Medication 2 G: at 16:25

## 2019-01-01 RX ADMIN — Medication 10 ML: at 06:07

## 2019-01-01 RX ADMIN — SUCRALFATE 1 G: 1 TABLET ORAL at 06:34

## 2019-01-01 RX ADMIN — MILRINONE LACTATE IN DEXTROSE 0.3 MCG/KG/MIN: 200 INJECTION, SOLUTION INTRAVENOUS at 22:29

## 2019-01-01 RX ADMIN — SALINE NASAL SPRAY 2 SPRAY: 1.5 SOLUTION NASAL at 17:06

## 2019-01-01 RX ADMIN — ACETAMINOPHEN 650 MG: 325 TABLET, FILM COATED ORAL at 00:03

## 2019-01-01 RX ADMIN — BUDESONIDE 500 MCG: 0.5 INHALANT RESPIRATORY (INHALATION) at 08:19

## 2019-01-01 RX ADMIN — DIGOXIN 0.06 MG: 125 TABLET ORAL at 17:56

## 2019-01-01 RX ADMIN — BUMETANIDE 1 MG: 0.25 INJECTION INTRAMUSCULAR; INTRAVENOUS at 04:20

## 2019-01-01 RX ADMIN — SPIRONOLACTONE 25 MG: 25 TABLET ORAL at 08:13

## 2019-01-01 RX ADMIN — OXYMETAZOLINE HYDROCHLORIDE 2 SPRAY: 0.05 SPRAY NASAL at 09:17

## 2019-01-01 RX ADMIN — SUCRALFATE 1 G: 1 TABLET ORAL at 21:30

## 2019-01-01 RX ADMIN — CASTOR OIL AND BALSAM, PERU: 788; 87 OINTMENT TOPICAL at 22:12

## 2019-01-01 RX ADMIN — CHLOROTHIAZIDE SODIUM 500 MG: 500 INJECTION, POWDER, LYOPHILIZED, FOR SOLUTION INTRAVENOUS at 11:27

## 2019-01-01 RX ADMIN — MILRINONE LACTATE IN DEXTROSE 0.2 MCG/KG/MIN: 200 INJECTION, SOLUTION INTRAVENOUS at 13:21

## 2019-01-01 RX ADMIN — MILRINONE LACTATE IN DEXTROSE 0.2 MCG/KG/MIN: 200 INJECTION, SOLUTION INTRAVENOUS at 05:43

## 2019-01-01 RX ADMIN — CASTOR OIL AND BALSAM, PERU: 788; 87 OINTMENT TOPICAL at 00:21

## 2019-01-01 RX ADMIN — INSULIN LISPRO 6 UNITS: 100 INJECTION, SOLUTION INTRAVENOUS; SUBCUTANEOUS at 13:46

## 2019-01-01 RX ADMIN — LEVETIRACETAM 125 MG: 250 TABLET ORAL at 09:14

## 2019-01-01 RX ADMIN — MAGNESIUM OXIDE TAB 400 MG (241.3 MG ELEMENTAL MG) 400 MG: 400 (241.3 MG) TAB at 08:17

## 2019-01-01 RX ADMIN — OCTREOTIDE ACETATE 25 MCG: 50 INJECTION, SOLUTION INTRAVENOUS; SUBCUTANEOUS at 21:38

## 2019-01-01 RX ADMIN — MAGNESIUM OXIDE TAB 400 MG (241.3 MG ELEMENTAL MG) 800 MG: 400 (241.3 MG) TAB at 08:10

## 2019-01-01 RX ADMIN — PIPERACILLIN SODIUM AND TAZOBACTAM SODIUM 3.38 G: 3; .375 INJECTION, POWDER, LYOPHILIZED, FOR SOLUTION INTRAVENOUS at 01:40

## 2019-01-01 RX ADMIN — BUMETANIDE 2 MG: 0.25 INJECTION INTRAMUSCULAR; INTRAVENOUS at 10:37

## 2019-01-01 RX ADMIN — DRONABINOL 2.5 MG: 2.5 CAPSULE ORAL at 16:05

## 2019-01-01 RX ADMIN — WARFARIN SODIUM 4 MG: 1 TABLET ORAL at 19:15

## 2019-01-01 RX ADMIN — BUDESONIDE 500 MCG: 0.5 INHALANT RESPIRATORY (INHALATION) at 20:52

## 2019-01-01 RX ADMIN — WARFARIN SODIUM 2 MG: 2 TABLET ORAL at 16:55

## 2019-01-01 RX ADMIN — MILRINONE LACTATE IN DEXTROSE 0.38 MCG/KG/MIN: 200 INJECTION, SOLUTION INTRAVENOUS at 23:01

## 2019-01-01 RX ADMIN — MAGNESIUM OXIDE TAB 400 MG (241.3 MG ELEMENTAL MG) 400 MG: 400 (241.3 MG) TAB at 18:23

## 2019-01-01 RX ADMIN — DRONABINOL 2.5 MG: 2.5 CAPSULE ORAL at 17:24

## 2019-01-01 RX ADMIN — CASTOR OIL AND BALSAM, PERU: 788; 87 OINTMENT TOPICAL at 10:36

## 2019-01-01 RX ADMIN — SUCRALFATE 1 G: 1 TABLET ORAL at 16:58

## 2019-01-01 RX ADMIN — CHLORHEXIDINE GLUCONATE 10 ML: 1.2 RINSE ORAL at 08:23

## 2019-01-01 RX ADMIN — POTASSIUM CHLORIDE 60 MEQ: 750 TABLET, FILM COATED, EXTENDED RELEASE ORAL at 21:49

## 2019-01-01 RX ADMIN — CEFAZOLIN 1 G: 1 INJECTION, POWDER, FOR SOLUTION INTRAMUSCULAR; INTRAVENOUS at 04:08

## 2019-01-01 RX ADMIN — Medication 10 ML: at 09:20

## 2019-01-01 RX ADMIN — POTASSIUM CHLORIDE AND SODIUM CHLORIDE: 450; 150 INJECTION, SOLUTION INTRAVENOUS at 10:51

## 2019-01-01 RX ADMIN — SUCRALFATE 1 G: 1 TABLET ORAL at 12:47

## 2019-01-01 RX ADMIN — ALLOPURINOL 100 MG: 100 TABLET ORAL at 08:39

## 2019-01-01 RX ADMIN — SILDENAFIL 40 MG: 20 TABLET ORAL at 17:29

## 2019-01-01 RX ADMIN — POTASSIUM CHLORIDE 20 MEQ: 29.8 INJECTION, SOLUTION INTRAVENOUS at 22:14

## 2019-01-01 RX ADMIN — POTASSIUM CHLORIDE 20 MEQ: 400 INJECTION, SOLUTION INTRAVENOUS at 13:08

## 2019-01-01 RX ADMIN — MILRINONE LACTATE IN DEXTROSE 0.38 MCG/KG/MIN: 200 INJECTION, SOLUTION INTRAVENOUS at 15:02

## 2019-01-01 RX ADMIN — CLONAZEPAM 0.5 MG: 0.5 TABLET ORAL at 21:41

## 2019-01-01 RX ADMIN — Medication 1 SPRAY: at 09:00

## 2019-01-01 RX ADMIN — Medication 10 ML: at 21:38

## 2019-01-01 RX ADMIN — MILRINONE LACTATE IN DEXTROSE 0.3 MCG/KG/MIN: 200 INJECTION, SOLUTION INTRAVENOUS at 05:00

## 2019-01-01 RX ADMIN — PROPOFOL 40 MG: 10 INJECTION, EMULSION INTRAVENOUS at 07:55

## 2019-01-01 RX ADMIN — INSULIN LISPRO 2 UNITS: 100 INJECTION, SOLUTION INTRAVENOUS; SUBCUTANEOUS at 05:31

## 2019-01-01 RX ADMIN — ALTEPLASE 1 MG: 2.2 INJECTION, POWDER, LYOPHILIZED, FOR SOLUTION INTRAVENOUS at 23:39

## 2019-01-01 RX ADMIN — POLYETHYLENE GLYCOL 3350 17 G: 17 POWDER, FOR SOLUTION ORAL at 09:18

## 2019-01-01 RX ADMIN — Medication 10 ML: at 17:43

## 2019-01-01 RX ADMIN — PROPOFOL 30 MG: 10 INJECTION, EMULSION INTRAVENOUS at 13:08

## 2019-01-01 RX ADMIN — SODIUM CHLORIDE 2.5 MCG/KG/MIN: 900 INJECTION, SOLUTION INTRAVENOUS at 18:02

## 2019-01-01 RX ADMIN — SODIUM CHLORIDE 40 MG: 9 INJECTION INTRAMUSCULAR; INTRAVENOUS; SUBCUTANEOUS at 08:45

## 2019-01-01 RX ADMIN — MAGNESIUM OXIDE TAB 400 MG (241.3 MG ELEMENTAL MG) 400 MG: 400 (241.3 MG) TAB at 09:39

## 2019-01-01 RX ADMIN — MUPIROCIN: 20 OINTMENT TOPICAL at 09:19

## 2019-01-01 RX ADMIN — SILDENAFIL CITRATE 20 MG: 20 TABLET ORAL at 09:25

## 2019-01-01 RX ADMIN — TAMSULOSIN HYDROCHLORIDE 0.4 MG: 0.4 CAPSULE ORAL at 09:10

## 2019-01-01 RX ADMIN — SILDENAFIL 40 MG: 20 TABLET ORAL at 23:54

## 2019-01-01 RX ADMIN — MIRTAZAPINE 15 MG: 15 TABLET, FILM COATED ORAL at 21:18

## 2019-01-01 RX ADMIN — Medication 10 ML: at 14:14

## 2019-01-01 RX ADMIN — BUDESONIDE 500 MCG: 0.5 INHALANT RESPIRATORY (INHALATION) at 08:01

## 2019-01-01 RX ADMIN — AMIODARONE HYDROCHLORIDE 100 MG: 200 TABLET ORAL at 18:45

## 2019-01-01 RX ADMIN — INSULIN LISPRO 2 UNITS: 100 INJECTION, SOLUTION INTRAVENOUS; SUBCUTANEOUS at 13:33

## 2019-01-01 RX ADMIN — Medication 10 ML: at 00:09

## 2019-01-01 RX ADMIN — POTASSIUM CHLORIDE 40 MEQ: 750 TABLET, FILM COATED, EXTENDED RELEASE ORAL at 08:40

## 2019-01-01 RX ADMIN — ACETAMINOPHEN 650 MG: 325 TABLET, FILM COATED ORAL at 21:56

## 2019-01-01 RX ADMIN — ESCITALOPRAM OXALATE 10 MG: 10 TABLET ORAL at 18:23

## 2019-01-01 RX ADMIN — IPRATROPIUM BROMIDE 0.5 MG: 0.5 SOLUTION RESPIRATORY (INHALATION) at 00:05

## 2019-01-01 RX ADMIN — TAMSULOSIN HYDROCHLORIDE 0.8 MG: 0.4 CAPSULE ORAL at 08:55

## 2019-01-01 RX ADMIN — BIVALIRUDIN 18.2 ML/HR: 250 INJECTION, POWDER, LYOPHILIZED, FOR SOLUTION INTRAVENOUS at 05:00

## 2019-01-01 RX ADMIN — OXYCODONE HYDROCHLORIDE 10 MG: 5 TABLET ORAL at 16:21

## 2019-01-01 RX ADMIN — ASPIRIN 81 MG 81 MG: 81 TABLET ORAL at 08:55

## 2019-01-01 RX ADMIN — ALBUTEROL SULFATE 2.5 MG: 2.5 SOLUTION RESPIRATORY (INHALATION) at 17:21

## 2019-01-01 RX ADMIN — MILRINONE LACTATE IN DEXTROSE 0.3 MCG/KG/MIN: 200 INJECTION, SOLUTION INTRAVENOUS at 20:27

## 2019-01-01 RX ADMIN — BUMETANIDE 2 MG: 0.25 INJECTION INTRAMUSCULAR; INTRAVENOUS at 18:02

## 2019-01-01 RX ADMIN — PROPOFOL 20 MG: 10 INJECTION, EMULSION INTRAVENOUS at 13:10

## 2019-01-01 RX ADMIN — SUCRALFATE 1 G: 1 TABLET ORAL at 07:40

## 2019-01-01 RX ADMIN — SILDENAFIL CITRATE 10 MG: 20 TABLET ORAL at 23:21

## 2019-01-01 RX ADMIN — PANTOPRAZOLE SODIUM 40 MG: 40 TABLET, DELAYED RELEASE ORAL at 07:48

## 2019-01-01 RX ADMIN — SUCRALFATE 1 G: 1 TABLET ORAL at 22:10

## 2019-01-01 RX ADMIN — ALTEPLASE 1 MG: 2.2 INJECTION, POWDER, LYOPHILIZED, FOR SOLUTION INTRAVENOUS at 01:00

## 2019-01-01 RX ADMIN — Medication 10 ML: at 09:21

## 2019-01-01 RX ADMIN — DOCUSATE SODIUM AND SENNOSIDES 1 TABLET: 50; 8.6 TABLET, FILM COATED ORAL at 09:06

## 2019-01-01 RX ADMIN — OCTREOTIDE ACETATE 25 MCG: 50 INJECTION, SOLUTION INTRAVENOUS; SUBCUTANEOUS at 17:57

## 2019-01-01 RX ADMIN — LEVETIRACETAM 125 MG: 250 TABLET ORAL at 09:24

## 2019-01-01 RX ADMIN — DEXTROSE MONOHYDRATE 150 MG: 5 INJECTION, SOLUTION INTRAVENOUS at 09:20

## 2019-01-01 RX ADMIN — BUMETANIDE 0.5 MG/HR: 0.25 INJECTION INTRAMUSCULAR; INTRAVENOUS at 04:44

## 2019-01-01 RX ADMIN — PIPERACILLIN AND TAZOBACTAM 3.38 G: 3; .375 INJECTION, POWDER, LYOPHILIZED, FOR SOLUTION INTRAVENOUS at 12:24

## 2019-01-01 RX ADMIN — PIPERACILLIN AND TAZOBACTAM 3.38 G: 3; .375 INJECTION, POWDER, LYOPHILIZED, FOR SOLUTION INTRAVENOUS at 00:15

## 2019-01-01 RX ADMIN — EPOETIN ALFA-EPBX 20000 UNITS: 10000 INJECTION, SOLUTION INTRAVENOUS; SUBCUTANEOUS at 20:05

## 2019-01-01 RX ADMIN — PANTOPRAZOLE SODIUM 40 MG: 40 TABLET, DELAYED RELEASE ORAL at 07:10

## 2019-01-01 RX ADMIN — ASPIRIN 81 MG: 81 TABLET, COATED ORAL at 08:49

## 2019-01-01 RX ADMIN — ARFORMOTEROL TARTRATE 15 MCG: 15 SOLUTION RESPIRATORY (INHALATION) at 07:12

## 2019-01-01 RX ADMIN — POTASSIUM CHLORIDE 20 MEQ: 400 INJECTION, SOLUTION INTRAVENOUS at 06:25

## 2019-01-01 RX ADMIN — TAMSULOSIN HYDROCHLORIDE 0.8 MG: 0.4 CAPSULE ORAL at 08:47

## 2019-01-01 RX ADMIN — PIPERACILLIN SODIUM AND TAZOBACTAM SODIUM 3.38 G: 3; .375 INJECTION, POWDER, LYOPHILIZED, FOR SOLUTION INTRAVENOUS at 09:51

## 2019-01-01 RX ADMIN — BENZOCAINE AND MENTHOL 1 LOZENGE: 15; 3.6 LOZENGE ORAL at 12:00

## 2019-01-01 RX ADMIN — MAGNESIUM OXIDE TAB 400 MG (241.3 MG ELEMENTAL MG) 800 MG: 400 (241.3 MG) TAB at 09:13

## 2019-01-01 RX ADMIN — ACETAMINOPHEN 1000 MG: 10 INJECTION, SOLUTION INTRAVENOUS at 13:56

## 2019-01-01 RX ADMIN — SILDENAFIL CITRATE 20 MG: 20 TABLET ORAL at 09:10

## 2019-01-01 RX ADMIN — OXYCODONE HYDROCHLORIDE 10 MG: 5 TABLET ORAL at 19:19

## 2019-01-01 RX ADMIN — DRONABINOL 2.5 MG: 2.5 CAPSULE ORAL at 08:48

## 2019-01-01 RX ADMIN — OXYCODONE 5 MG: 5 TABLET ORAL at 21:50

## 2019-01-01 RX ADMIN — OXYCODONE HYDROCHLORIDE 10 MG: 5 TABLET ORAL at 22:02

## 2019-01-01 RX ADMIN — ALBUMIN (HUMAN) 12.5 G: 0.25 INJECTION, SOLUTION INTRAVENOUS at 17:46

## 2019-01-01 RX ADMIN — Medication 10 ML: at 06:01

## 2019-01-01 RX ADMIN — DIGOXIN 0.25 MG: 250 TABLET ORAL at 09:26

## 2019-01-01 RX ADMIN — CEFEPIME HYDROCHLORIDE 2 G: 2 INJECTION, POWDER, FOR SOLUTION INTRAVENOUS at 13:21

## 2019-01-01 RX ADMIN — BUDESONIDE 500 MCG: 0.5 INHALANT RESPIRATORY (INHALATION) at 19:30

## 2019-01-01 RX ADMIN — INSULIN LISPRO 2 UNITS: 100 INJECTION, SOLUTION INTRAVENOUS; SUBCUTANEOUS at 12:41

## 2019-01-01 RX ADMIN — SUCRALFATE 1 G: 1 TABLET ORAL at 06:58

## 2019-01-01 RX ADMIN — SPIRONOLACTONE 25 MG: 25 TABLET ORAL at 09:56

## 2019-01-01 RX ADMIN — MORPHINE SULFATE 4 MG: 4 INJECTION, SOLUTION INTRAMUSCULAR; INTRAVENOUS at 03:07

## 2019-01-01 RX ADMIN — MAGNESIUM OXIDE TAB 400 MG (241.3 MG ELEMENTAL MG) 400 MG: 400 (241.3 MG) TAB at 09:30

## 2019-01-01 RX ADMIN — BUMETANIDE 1 MG: 0.25 INJECTION INTRAMUSCULAR; INTRAVENOUS at 17:16

## 2019-01-01 RX ADMIN — MILRINONE LACTATE IN DEXTROSE 0.38 MCG/KG/MIN: 200 INJECTION, SOLUTION INTRAVENOUS at 08:11

## 2019-01-01 RX ADMIN — CEFAZOLIN 1 G: 1 INJECTION, POWDER, FOR SOLUTION INTRAMUSCULAR; INTRAVENOUS; PARENTERAL at 00:07

## 2019-01-01 RX ADMIN — OCTREOTIDE ACETATE 25 MCG: 50 INJECTION, SOLUTION INTRAVENOUS; SUBCUTANEOUS at 21:04

## 2019-01-01 RX ADMIN — BUDESONIDE 500 MCG: 0.5 INHALANT RESPIRATORY (INHALATION) at 21:05

## 2019-01-01 RX ADMIN — Medication 10 ML: at 21:19

## 2019-01-01 RX ADMIN — PIPERACILLIN SODIUM AND TAZOBACTAM SODIUM 3.38 G: 3; .375 INJECTION, POWDER, LYOPHILIZED, FOR SOLUTION INTRAVENOUS at 16:46

## 2019-01-01 RX ADMIN — BISACODYL 5 MG: 5 TABLET, COATED ORAL at 15:33

## 2019-01-01 RX ADMIN — WARFARIN SODIUM 2 MG: 2 TABLET ORAL at 17:39

## 2019-01-01 RX ADMIN — ACETAMINOPHEN 650 MG: 325 TABLET, FILM COATED ORAL at 09:45

## 2019-01-01 RX ADMIN — DOBUTAMINE IN DEXTROSE 2.5 MCG/KG/MIN: 200 INJECTION, SOLUTION INTRAVENOUS at 14:27

## 2019-01-01 RX ADMIN — CASTOR OIL AND BALSAM, PERU: 788; 87 OINTMENT TOPICAL at 18:03

## 2019-01-01 RX ADMIN — DOCUSATE SODIUM AND SENNOSIDES 1 TABLET: 50; 8.6 TABLET, FILM COATED ORAL at 18:50

## 2019-01-01 RX ADMIN — DOCUSATE SODIUM AND SENNOSIDES 1 TABLET: 50; 8.6 TABLET, FILM COATED ORAL at 08:09

## 2019-01-01 RX ADMIN — Medication 1 CAPSULE: at 09:28

## 2019-01-01 RX ADMIN — AMIODARONE HYDROCHLORIDE 100 MG: 200 TABLET ORAL at 08:49

## 2019-01-01 RX ADMIN — IPRATROPIUM BROMIDE AND ALBUTEROL SULFATE 3 ML: .5; 3 SOLUTION RESPIRATORY (INHALATION) at 21:38

## 2019-01-01 RX ADMIN — LEVETIRACETAM 250 MG: 100 INJECTION, SOLUTION, CONCENTRATE INTRAVENOUS at 06:06

## 2019-01-01 RX ADMIN — CHLORHEXIDINE GLUCONATE 10 ML: 1.2 RINSE ORAL at 21:00

## 2019-01-01 RX ADMIN — POTASSIUM CHLORIDE 20 MEQ: 400 INJECTION, SOLUTION INTRAVENOUS at 08:28

## 2019-01-01 RX ADMIN — BUMETANIDE 0.5 MG/HR: 0.25 INJECTION INTRAMUSCULAR; INTRAVENOUS at 11:25

## 2019-01-01 RX ADMIN — ALLOPURINOL 100 MG: 100 TABLET ORAL at 08:16

## 2019-01-01 RX ADMIN — ACETAMINOPHEN 650 MG: 325 TABLET, FILM COATED ORAL at 21:21

## 2019-01-01 RX ADMIN — ALLOPURINOL 100 MG: 100 TABLET ORAL at 08:29

## 2019-01-01 RX ADMIN — MAGNESIUM OXIDE TAB 400 MG (241.3 MG ELEMENTAL MG) 400 MG: 400 (241.3 MG) TAB at 17:18

## 2019-01-01 RX ADMIN — ASPIRIN 81 MG 81 MG: 81 TABLET ORAL at 09:00

## 2019-01-01 RX ADMIN — LEVETIRACETAM 250 MG: 100 INJECTION, SOLUTION, CONCENTRATE INTRAVENOUS at 17:40

## 2019-01-01 RX ADMIN — DIGOXIN 0.06 MG: 125 TABLET ORAL at 21:21

## 2019-01-01 RX ADMIN — TAMSULOSIN HYDROCHLORIDE 0.8 MG: 0.4 CAPSULE ORAL at 09:28

## 2019-01-01 RX ADMIN — MAGNESIUM OXIDE TAB 400 MG (241.3 MG ELEMENTAL MG) 400 MG: 400 (241.3 MG) TAB at 09:28

## 2019-01-01 RX ADMIN — PIPERACILLIN AND TAZOBACTAM 3.38 G: 3; .375 INJECTION, POWDER, FOR SOLUTION INTRAVENOUS at 15:34

## 2019-01-01 RX ADMIN — Medication 10 ML: at 07:06

## 2019-01-01 RX ADMIN — BUMETANIDE 3 MG: 0.25 INJECTION INTRAMUSCULAR; INTRAVENOUS at 15:22

## 2019-01-01 RX ADMIN — ACETAMINOPHEN 650 MG: 325 TABLET, FILM COATED ORAL at 18:58

## 2019-01-01 RX ADMIN — BUMETANIDE 1 MG/HR: 0.25 INJECTION INTRAMUSCULAR; INTRAVENOUS at 05:52

## 2019-01-01 RX ADMIN — SODIUM CHLORIDE 600 MG: 9 INJECTION, SOLUTION INTRAVENOUS at 04:03

## 2019-01-01 RX ADMIN — SUCRALFATE 1 G: 1 TABLET ORAL at 22:04

## 2019-01-01 RX ADMIN — MILRINONE LACTATE IN DEXTROSE 0.38 MCG/KG/MIN: 200 INJECTION, SOLUTION INTRAVENOUS at 17:19

## 2019-01-01 RX ADMIN — BENZOCAINE AND MENTHOL 1 LOZENGE: 15; 3.6 LOZENGE ORAL at 07:06

## 2019-01-01 RX ADMIN — DRONABINOL 2.5 MG: 2.5 CAPSULE ORAL at 17:42

## 2019-01-01 RX ADMIN — ALBUMIN (HUMAN) 12.5 G: 12.5 INJECTION, SOLUTION INTRAVENOUS at 12:15

## 2019-01-01 RX ADMIN — Medication 2 G: at 18:00

## 2019-01-01 RX ADMIN — SUCRALFATE 1 G: 1 TABLET ORAL at 12:29

## 2019-01-01 RX ADMIN — ACETAMINOPHEN 650 MG: 325 TABLET, FILM COATED ORAL at 07:09

## 2019-01-01 RX ADMIN — Medication 10 ML: at 22:17

## 2019-01-01 RX ADMIN — DEXTROSE MONOHYDRATE 8 ML/HR: 5 INJECTION, SOLUTION INTRAVENOUS at 22:30

## 2019-01-01 RX ADMIN — SALINE NASAL SPRAY 2 SPRAY: 1.5 SOLUTION NASAL at 17:11

## 2019-01-01 RX ADMIN — AMIODARONE HYDROCHLORIDE 100 MG: 200 TABLET ORAL at 08:10

## 2019-01-01 RX ADMIN — SILDENAFIL CITRATE 20 MG: 20 TABLET ORAL at 08:16

## 2019-01-01 RX ADMIN — SUCRALFATE 1 G: 1 TABLET ORAL at 07:24

## 2019-01-01 RX ADMIN — Medication 2 G: at 00:04

## 2019-01-01 RX ADMIN — OCTREOTIDE ACETATE 25 MCG: 50 INJECTION, SOLUTION INTRAVENOUS; SUBCUTANEOUS at 18:07

## 2019-01-01 RX ADMIN — BIVALIRUDIN 0.22 MG/KG/HR: 250 INJECTION, POWDER, LYOPHILIZED, FOR SOLUTION INTRAVENOUS at 08:17

## 2019-01-01 RX ADMIN — CHLORHEXIDINE GLUCONATE 10 ML: 1.2 RINSE ORAL at 14:39

## 2019-01-01 RX ADMIN — MILRINONE LACTATE IN DEXTROSE 0.3 MCG/KG/MIN: 200 INJECTION, SOLUTION INTRAVENOUS at 07:31

## 2019-01-01 RX ADMIN — AMIODARONE HYDROCHLORIDE 400 MG: 200 TABLET ORAL at 09:19

## 2019-01-01 RX ADMIN — HEPARIN SODIUM AND DEXTROSE 17 UNITS/KG/HR: 10000; 5 INJECTION INTRAVENOUS at 04:44

## 2019-01-01 RX ADMIN — PANTOPRAZOLE SODIUM 40 MG: 40 TABLET, DELAYED RELEASE ORAL at 07:30

## 2019-01-01 RX ADMIN — VANCOMYCIN HYDROCHLORIDE 1250 MG: 10 INJECTION, POWDER, LYOPHILIZED, FOR SOLUTION INTRAVENOUS at 15:46

## 2019-01-01 RX ADMIN — CEFEPIME HYDROCHLORIDE 2 G: 2 INJECTION, POWDER, FOR SOLUTION INTRAVENOUS at 12:30

## 2019-01-01 RX ADMIN — IPRATROPIUM BROMIDE AND ALBUTEROL SULFATE 3 ML: .5; 3 SOLUTION RESPIRATORY (INHALATION) at 19:56

## 2019-01-01 RX ADMIN — Medication 2 G: at 08:03

## 2019-01-01 RX ADMIN — SALINE NASAL SPRAY 2 SPRAY: 1.5 SOLUTION NASAL at 10:05

## 2019-01-01 RX ADMIN — Medication 30 ML: at 18:06

## 2019-01-01 RX ADMIN — MAGNESIUM OXIDE TAB 400 MG (241.3 MG ELEMENTAL MG) 400 MG: 400 (241.3 MG) TAB at 17:04

## 2019-01-01 RX ADMIN — CEFEPIME HYDROCHLORIDE 2 G: 2 INJECTION, POWDER, FOR SOLUTION INTRAVENOUS at 13:04

## 2019-01-01 RX ADMIN — AMIODARONE HYDROCHLORIDE 100 MG: 200 TABLET ORAL at 10:30

## 2019-01-01 RX ADMIN — BUDESONIDE 500 MCG: 0.5 INHALANT RESPIRATORY (INHALATION) at 19:45

## 2019-01-01 RX ADMIN — MORPHINE SULFATE 4 MG: 4 INJECTION, SOLUTION INTRAMUSCULAR; INTRAVENOUS at 21:15

## 2019-01-01 RX ADMIN — IPRATROPIUM BROMIDE AND ALBUTEROL SULFATE 3 ML: .5; 3 SOLUTION RESPIRATORY (INHALATION) at 21:06

## 2019-01-01 RX ADMIN — SILDENAFIL CITRATE 20 MG: 20 TABLET, FILM COATED ORAL at 09:11

## 2019-01-01 RX ADMIN — Medication 10 ML: at 22:09

## 2019-01-01 RX ADMIN — Medication 2 G: at 23:01

## 2019-01-01 RX ADMIN — POTASSIUM CHLORIDE 40 MEQ: 750 TABLET, FILM COATED, EXTENDED RELEASE ORAL at 10:38

## 2019-01-01 RX ADMIN — ALBUMIN (HUMAN) 12.5 G: 12.5 INJECTION, SOLUTION INTRAVENOUS at 23:34

## 2019-01-01 RX ADMIN — POTASSIUM CHLORIDE 20 MEQ: 29.8 INJECTION, SOLUTION INTRAVENOUS at 06:50

## 2019-01-01 RX ADMIN — Medication 10 ML: at 07:41

## 2019-01-01 RX ADMIN — DRONABINOL 5 MG: 2.5 CAPSULE ORAL at 16:30

## 2019-01-01 RX ADMIN — BUDESONIDE 500 MCG: 0.5 INHALANT RESPIRATORY (INHALATION) at 20:00

## 2019-01-01 RX ADMIN — CASTOR OIL AND BALSAM, PERU: 788; 87 OINTMENT TOPICAL at 21:57

## 2019-01-01 RX ADMIN — AMIODARONE HYDROCHLORIDE 100 MG: 200 TABLET ORAL at 08:57

## 2019-01-01 RX ADMIN — Medication 1 CAPSULE: at 08:16

## 2019-01-01 RX ADMIN — MILRINONE LACTATE IN DEXTROSE 0.2 MCG/KG/MIN: 200 INJECTION, SOLUTION INTRAVENOUS at 18:47

## 2019-01-01 RX ADMIN — FLUCONAZOLE 200 MG: 200 INJECTION, SOLUTION INTRAVENOUS at 05:00

## 2019-01-01 RX ADMIN — OCTREOTIDE ACETATE 25 MCG: 50 INJECTION, SOLUTION INTRAVENOUS; SUBCUTANEOUS at 09:24

## 2019-01-01 RX ADMIN — IPRATROPIUM BROMIDE AND ALBUTEROL SULFATE 3 ML: .5; 3 SOLUTION RESPIRATORY (INHALATION) at 05:26

## 2019-01-01 RX ADMIN — POTASSIUM CHLORIDE 20 MEQ: 29.8 INJECTION, SOLUTION INTRAVENOUS at 19:36

## 2019-01-01 RX ADMIN — POLYETHYLENE GLYCOL 3350 17 G: 17 POWDER, FOR SOLUTION ORAL at 09:26

## 2019-01-01 RX ADMIN — WATER 2 MG: 1 INJECTION INTRAMUSCULAR; INTRAVENOUS; SUBCUTANEOUS at 06:00

## 2019-01-01 RX ADMIN — ALLOPURINOL 100 MG: 100 TABLET ORAL at 08:51

## 2019-01-01 RX ADMIN — MAGNESIUM OXIDE TAB 400 MG (241.3 MG ELEMENTAL MG) 400 MG: 400 (241.3 MG) TAB at 18:14

## 2019-01-01 RX ADMIN — ARFORMOTEROL TARTRATE 15 MCG: 15 SOLUTION RESPIRATORY (INHALATION) at 07:16

## 2019-01-01 RX ADMIN — ASPIRIN 81 MG: 81 TABLET, COATED ORAL at 08:26

## 2019-01-01 RX ADMIN — Medication 1 CAPSULE: at 08:13

## 2019-01-01 RX ADMIN — WARFARIN SODIUM 3 MG: 1 TABLET ORAL at 17:04

## 2019-01-01 RX ADMIN — SODIUM CHLORIDE: 900 INJECTION, SOLUTION INTRAVENOUS at 08:00

## 2019-01-01 RX ADMIN — ALLOPURINOL 100 MG: 100 TABLET ORAL at 09:00

## 2019-01-01 RX ADMIN — INSULIN LISPRO 2 UNITS: 100 INJECTION, SOLUTION INTRAVENOUS; SUBCUTANEOUS at 23:27

## 2019-01-01 RX ADMIN — OXYMETAZOLINE HYDROCHLORIDE 2 SPRAY: 0.05 SPRAY NASAL at 18:53

## 2019-01-01 RX ADMIN — CASTOR OIL AND BALSAM, PERU: 788; 87 OINTMENT TOPICAL at 08:17

## 2019-01-01 RX ADMIN — POTASSIUM CHLORIDE 20 MEQ: 750 TABLET, FILM COATED, EXTENDED RELEASE ORAL at 08:29

## 2019-01-01 RX ADMIN — CASTOR OIL AND BALSAM, PERU: 788; 87 OINTMENT TOPICAL at 08:31

## 2019-01-01 RX ADMIN — DEXMEDETOMIDINE HYDROCHLORIDE 0.9 MCG/KG/HR: 100 INJECTION, SOLUTION, CONCENTRATE INTRAVENOUS at 07:33

## 2019-01-01 RX ADMIN — POTASSIUM CHLORIDE 40 MEQ: 750 TABLET, FILM COATED, EXTENDED RELEASE ORAL at 08:27

## 2019-01-01 RX ADMIN — MILRINONE LACTATE IN DEXTROSE 0.3 MCG/KG/MIN: 200 INJECTION, SOLUTION INTRAVENOUS at 10:12

## 2019-01-01 RX ADMIN — MILRINONE LACTATE IN DEXTROSE 0.38 MCG/KG/MIN: 200 INJECTION, SOLUTION INTRAVENOUS at 21:29

## 2019-01-01 RX ADMIN — CEFAZOLIN 1 G: 1 INJECTION, POWDER, FOR SOLUTION INTRAMUSCULAR; INTRAVENOUS; PARENTERAL at 07:08

## 2019-01-01 RX ADMIN — DOBUTAMINE HYDROCHLORIDE 10 MCG/KG/MIN: 400 INJECTION INTRAVENOUS at 14:15

## 2019-01-01 RX ADMIN — TAMSULOSIN HYDROCHLORIDE 0.4 MG: 0.4 CAPSULE ORAL at 09:00

## 2019-01-01 RX ADMIN — BACITRACIN: 500 OINTMENT TOPICAL at 16:00

## 2019-01-01 RX ADMIN — Medication 10 ML: at 09:42

## 2019-01-01 RX ADMIN — OCTREOTIDE ACETATE 25 MCG: 50 INJECTION, SOLUTION INTRAVENOUS; SUBCUTANEOUS at 21:22

## 2019-01-01 RX ADMIN — IPRATROPIUM BROMIDE AND ALBUTEROL SULFATE 3 ML: .5; 3 SOLUTION RESPIRATORY (INHALATION) at 16:02

## 2019-01-01 RX ADMIN — STANDARDIZED SENNA CONCENTRATE AND DOCUSATE SODIUM 1 TABLET: 8.6; 5 TABLET ORAL at 09:10

## 2019-01-01 RX ADMIN — OCTREOTIDE ACETATE 25 MCG: 50 INJECTION, SOLUTION INTRAVENOUS; SUBCUTANEOUS at 17:40

## 2019-01-01 RX ADMIN — FINASTERIDE 5 MG: 5 TABLET, FILM COATED ORAL at 10:38

## 2019-01-01 RX ADMIN — OXYCODONE HYDROCHLORIDE 10 MG: 5 TABLET ORAL at 20:00

## 2019-01-01 RX ADMIN — SILDENAFIL 40 MG: 20 TABLET ORAL at 08:51

## 2019-01-01 RX ADMIN — SILDENAFIL CITRATE 20 MG: 20 TABLET, FILM COATED ORAL at 02:58

## 2019-01-01 RX ADMIN — FAMOTIDINE 20 MG: 20 TABLET ORAL at 10:30

## 2019-01-01 RX ADMIN — OCTREOTIDE ACETATE 25 MCG: 50 INJECTION, SOLUTION INTRAVENOUS; SUBCUTANEOUS at 23:15

## 2019-01-01 RX ADMIN — LIDOCAINE HYDROCHLORIDE: 20 JELLY TOPICAL at 17:24

## 2019-01-01 RX ADMIN — ALBUTEROL SULFATE 2.5 MG: 2.5 SOLUTION RESPIRATORY (INHALATION) at 09:11

## 2019-01-01 RX ADMIN — LEVETIRACETAM 250 MG: 100 SOLUTION ORAL at 18:29

## 2019-01-01 RX ADMIN — LEVETIRACETAM 250 MG: 250 TABLET ORAL at 09:14

## 2019-01-01 RX ADMIN — Medication 2 G: at 18:30

## 2019-01-01 RX ADMIN — IPRATROPIUM BROMIDE 0.5 MG: 0.5 SOLUTION RESPIRATORY (INHALATION) at 00:21

## 2019-01-01 RX ADMIN — CHLORHEXIDINE GLUCONATE 10 ML: 1.2 RINSE ORAL at 17:40

## 2019-01-01 RX ADMIN — PHENYLEPHRINE HYDROCHLORIDE 40 MCG/MIN: 10 INJECTION INTRAVENOUS at 07:50

## 2019-01-01 RX ADMIN — CHLORHEXIDINE GLUCONATE 10 ML: 1.2 RINSE ORAL at 20:06

## 2019-01-01 RX ADMIN — ALLOPURINOL 100 MG: 100 TABLET ORAL at 08:50

## 2019-01-01 RX ADMIN — OXYCODONE HYDROCHLORIDE 10 MG: 5 TABLET ORAL at 16:40

## 2019-01-01 RX ADMIN — Medication 2 G: at 18:32

## 2019-01-01 RX ADMIN — OCTREOTIDE ACETATE 25 MCG: 50 INJECTION, SOLUTION INTRAVENOUS; SUBCUTANEOUS at 21:05

## 2019-01-01 RX ADMIN — Medication 40 ML: at 21:30

## 2019-01-01 RX ADMIN — TAMSULOSIN HYDROCHLORIDE 0.8 MG: 0.4 CAPSULE ORAL at 09:41

## 2019-01-01 RX ADMIN — CHLORHEXIDINE GLUCONATE 10 ML: 1.2 RINSE ORAL at 17:35

## 2019-01-01 RX ADMIN — ALBUMIN (HUMAN) 12.5 G: 0.25 INJECTION, SOLUTION INTRAVENOUS at 18:03

## 2019-01-01 RX ADMIN — METOCLOPRAMIDE 5 MG: 5 INJECTION, SOLUTION INTRAMUSCULAR; INTRAVENOUS at 02:00

## 2019-01-01 RX ADMIN — MORPHINE SULFATE 4 MG: 4 INJECTION, SOLUTION INTRAMUSCULAR; INTRAVENOUS at 21:30

## 2019-01-01 RX ADMIN — SPIRONOLACTONE 25 MG: 25 TABLET ORAL at 08:42

## 2019-01-01 RX ADMIN — FENTANYL CITRATE 50 MCG: 50 INJECTION, SOLUTION INTRAMUSCULAR; INTRAVENOUS at 09:06

## 2019-01-01 RX ADMIN — POTASSIUM CHLORIDE 20 MEQ: 400 INJECTION, SOLUTION INTRAVENOUS at 07:17

## 2019-01-01 RX ADMIN — Medication 10 ML: at 15:03

## 2019-01-01 RX ADMIN — THERA TABS 1 TABLET: TAB at 08:50

## 2019-01-01 RX ADMIN — HYDRALAZINE HYDROCHLORIDE 10 MG: 10 TABLET, FILM COATED ORAL at 17:24

## 2019-01-01 RX ADMIN — AMIODARONE HYDROCHLORIDE 100 MG: 200 TABLET ORAL at 08:17

## 2019-01-01 RX ADMIN — SILDENAFIL CITRATE 20 MG: 20 TABLET, FILM COATED ORAL at 20:23

## 2019-01-01 RX ADMIN — CHLOROTHIAZIDE SODIUM 250 MG: 500 INJECTION, POWDER, LYOPHILIZED, FOR SOLUTION INTRAVENOUS at 02:01

## 2019-01-01 RX ADMIN — HYDRALAZINE HYDROCHLORIDE 10 MG: 10 TABLET, FILM COATED ORAL at 09:00

## 2019-01-01 RX ADMIN — SUCRALFATE 1 G: 1 TABLET ORAL at 21:38

## 2019-01-01 RX ADMIN — Medication 1 CAPSULE: at 08:39

## 2019-01-01 RX ADMIN — DOCUSATE SODIUM AND SENNOSIDES 1 TABLET: 50; 8.6 TABLET, FILM COATED ORAL at 08:57

## 2019-01-01 RX ADMIN — SILDENAFIL 40 MG: 20 TABLET ORAL at 09:01

## 2019-01-01 RX ADMIN — ALLOPURINOL 100 MG: 100 TABLET ORAL at 08:56

## 2019-01-01 RX ADMIN — LEVETIRACETAM 250 MG: 100 SOLUTION ORAL at 18:45

## 2019-01-01 RX ADMIN — MAGNESIUM OXIDE TAB 400 MG (241.3 MG ELEMENTAL MG) 400 MG: 400 (241.3 MG) TAB at 10:38

## 2019-01-01 RX ADMIN — SENNOSIDES AND DOCUSATE SODIUM 1 TABLET: 8.6; 5 TABLET ORAL at 18:23

## 2019-01-01 RX ADMIN — ALLOPURINOL 100 MG: 100 TABLET ORAL at 08:38

## 2019-01-01 RX ADMIN — PIPERACILLIN AND TAZOBACTAM 3.38 G: 3; .375 INJECTION, POWDER, FOR SOLUTION INTRAVENOUS at 00:11

## 2019-01-01 RX ADMIN — BUDESONIDE 500 MCG: 0.5 INHALANT RESPIRATORY (INHALATION) at 20:29

## 2019-01-01 RX ADMIN — Medication 400 MG: at 17:01

## 2019-01-01 RX ADMIN — Medication 2 G: at 17:47

## 2019-01-01 RX ADMIN — ALLOPURINOL 100 MG: 100 TABLET ORAL at 08:57

## 2019-01-01 RX ADMIN — Medication 1 CAPSULE: at 09:23

## 2019-01-01 RX ADMIN — MILRINONE LACTATE IN DEXTROSE 0.12 MCG/KG/MIN: 200 INJECTION, SOLUTION INTRAVENOUS at 13:28

## 2019-01-01 RX ADMIN — POLYETHYLENE GLYCOL 3350 17 G: 17 POWDER, FOR SOLUTION ORAL at 09:15

## 2019-01-01 RX ADMIN — INSULIN LISPRO 2 UNITS: 100 INJECTION, SOLUTION INTRAVENOUS; SUBCUTANEOUS at 18:56

## 2019-01-01 RX ADMIN — ALTEPLASE 1 MG: 2.2 INJECTION, POWDER, LYOPHILIZED, FOR SOLUTION INTRAVENOUS at 20:43

## 2019-01-01 RX ADMIN — FINASTERIDE 5 MG: 5 TABLET, FILM COATED ORAL at 08:38

## 2019-01-01 RX ADMIN — BUDESONIDE 500 MCG: 0.5 INHALANT RESPIRATORY (INHALATION) at 19:27

## 2019-01-01 RX ADMIN — SODIUM CHLORIDE 40 MG: 9 INJECTION INTRAMUSCULAR; INTRAVENOUS; SUBCUTANEOUS at 21:12

## 2019-01-01 RX ADMIN — MAGNESIUM OXIDE TAB 400 MG (241.3 MG ELEMENTAL MG) 800 MG: 400 (241.3 MG) TAB at 08:42

## 2019-01-01 RX ADMIN — Medication 10 ML: at 13:46

## 2019-01-01 RX ADMIN — TAMSULOSIN HYDROCHLORIDE 0.8 MG: 0.4 CAPSULE ORAL at 09:25

## 2019-01-01 RX ADMIN — Medication 30 ML: at 14:59

## 2019-01-01 RX ADMIN — STANDARDIZED SENNA CONCENTRATE AND DOCUSATE SODIUM 1 TABLET: 8.6; 5 TABLET ORAL at 10:38

## 2019-01-01 RX ADMIN — IPRATROPIUM BROMIDE AND ALBUTEROL SULFATE 3 ML: .5; 3 SOLUTION RESPIRATORY (INHALATION) at 07:10

## 2019-01-01 RX ADMIN — SUCRALFATE 1 G: 1 TABLET ORAL at 17:41

## 2019-01-01 RX ADMIN — BUMETANIDE 2 MG: 0.25 INJECTION INTRAMUSCULAR; INTRAVENOUS at 20:36

## 2019-01-01 RX ADMIN — SALINE NASAL SPRAY 2 SPRAY: 1.5 SOLUTION NASAL at 19:05

## 2019-01-01 RX ADMIN — SILDENAFIL CITRATE 20 MG: 20 TABLET ORAL at 21:33

## 2019-01-01 RX ADMIN — LEVETIRACETAM 250 MG: 100 SOLUTION ORAL at 07:21

## 2019-01-01 RX ADMIN — BUMETANIDE 1 MG: 0.25 INJECTION INTRAMUSCULAR; INTRAVENOUS at 08:16

## 2019-01-01 RX ADMIN — OCTREOTIDE ACETATE 25 MCG: 50 INJECTION, SOLUTION INTRAVENOUS; SUBCUTANEOUS at 09:43

## 2019-01-01 RX ADMIN — THERA TABS 1 TABLET: TAB at 08:43

## 2019-01-01 RX ADMIN — WARFARIN SODIUM 5 MG: 5 TABLET ORAL at 17:17

## 2019-01-01 RX ADMIN — PIPERACILLIN SODIUM AND TAZOBACTAM SODIUM 3.38 G: 3; .375 INJECTION, POWDER, LYOPHILIZED, FOR SOLUTION INTRAVENOUS at 08:56

## 2019-01-01 RX ADMIN — MAGNESIUM OXIDE TAB 400 MG (241.3 MG ELEMENTAL MG) 400 MG: 400 (241.3 MG) TAB at 17:27

## 2019-01-01 RX ADMIN — OXYCODONE HYDROCHLORIDE 10 MG: 5 TABLET ORAL at 19:35

## 2019-01-01 RX ADMIN — MIRTAZAPINE 15 MG: 15 TABLET, FILM COATED ORAL at 21:50

## 2019-01-01 RX ADMIN — Medication 10 ML: at 17:04

## 2019-01-01 RX ADMIN — LEVETIRACETAM 250 MG: 100 INJECTION, SOLUTION, CONCENTRATE INTRAVENOUS at 06:01

## 2019-01-01 RX ADMIN — HYDRALAZINE HYDROCHLORIDE 10 MG: 10 TABLET, FILM COATED ORAL at 22:03

## 2019-01-01 RX ADMIN — HYDRALAZINE HYDROCHLORIDE 10 MG: 10 TABLET, FILM COATED ORAL at 17:47

## 2019-01-01 RX ADMIN — POTASSIUM CHLORIDE 40 MEQ: 750 TABLET, FILM COATED, EXTENDED RELEASE ORAL at 08:39

## 2019-01-01 RX ADMIN — CEFEPIME HYDROCHLORIDE 2 G: 2 INJECTION, POWDER, FOR SOLUTION INTRAVENOUS at 13:33

## 2019-01-01 RX ADMIN — CLONAZEPAM 0.5 MG: 0.5 TABLET ORAL at 15:34

## 2019-01-01 RX ADMIN — POTASSIUM CHLORIDE 60 MEQ: 750 TABLET, FILM COATED, EXTENDED RELEASE ORAL at 09:52

## 2019-01-01 RX ADMIN — SUCRALFATE 1 G: 1 TABLET ORAL at 10:56

## 2019-01-01 RX ADMIN — OCTREOTIDE ACETATE 25 MCG: 50 INJECTION, SOLUTION INTRAVENOUS; SUBCUTANEOUS at 21:52

## 2019-01-01 RX ADMIN — AMIODARONE HYDROCHLORIDE 100 MG: 200 TABLET ORAL at 08:55

## 2019-01-01 RX ADMIN — SILDENAFIL CITRATE 20 MG: 20 TABLET, FILM COATED ORAL at 02:12

## 2019-01-01 RX ADMIN — SUCRALFATE 1 G: 1 TABLET ORAL at 18:04

## 2019-01-01 RX ADMIN — Medication 10 ML: at 07:47

## 2019-01-01 RX ADMIN — BIVALIRUDIN 0.22 MG/KG/HR: 250 INJECTION, POWDER, LYOPHILIZED, FOR SOLUTION INTRAVENOUS at 10:38

## 2019-01-01 RX ADMIN — ALBUMIN (HUMAN) 25 G: 12.5 INJECTION, SOLUTION INTRAVENOUS at 23:32

## 2019-01-01 RX ADMIN — CEFEPIME HYDROCHLORIDE 2 G: 2 INJECTION, POWDER, FOR SOLUTION INTRAVENOUS at 14:22

## 2019-01-01 RX ADMIN — CASTOR OIL AND BALSAM, PERU: 788; 87 OINTMENT TOPICAL at 17:21

## 2019-01-01 RX ADMIN — Medication 10 ML: at 05:31

## 2019-01-01 RX ADMIN — PANTOPRAZOLE SODIUM 40 MG: 40 TABLET, DELAYED RELEASE ORAL at 08:48

## 2019-01-01 RX ADMIN — ALBUTEROL SULFATE 2.5 MG: 2.5 SOLUTION RESPIRATORY (INHALATION) at 07:12

## 2019-01-01 RX ADMIN — MILRINONE LACTATE IN DEXTROSE 0.38 MCG/KG/MIN: 200 INJECTION, SOLUTION INTRAVENOUS at 18:15

## 2019-01-01 RX ADMIN — VANCOMYCIN HYDROCHLORIDE 1250 MG: 10 INJECTION, POWDER, LYOPHILIZED, FOR SOLUTION INTRAVENOUS at 23:32

## 2019-01-01 RX ADMIN — MIDAZOLAM 2 MG: 1 INJECTION INTRAMUSCULAR; INTRAVENOUS at 07:55

## 2019-01-01 RX ADMIN — DEXMEDETOMIDINE HYDROCHLORIDE 1 MCG/KG/HR: 100 INJECTION, SOLUTION, CONCENTRATE INTRAVENOUS at 20:05

## 2019-01-01 RX ADMIN — SILDENAFIL 40 MG: 20 TABLET ORAL at 08:10

## 2019-01-01 RX ADMIN — BIVALIRUDIN 0.22 MG/KG/HR: 250 INJECTION, POWDER, LYOPHILIZED, FOR SOLUTION INTRAVENOUS at 10:25

## 2019-01-01 RX ADMIN — PIPERACILLIN SODIUM AND TAZOBACTAM SODIUM 3.38 G: 3; .375 INJECTION, POWDER, LYOPHILIZED, FOR SOLUTION INTRAVENOUS at 17:40

## 2019-01-01 RX ADMIN — IPRATROPIUM BROMIDE AND ALBUTEROL SULFATE 3 ML: .5; 3 SOLUTION RESPIRATORY (INHALATION) at 06:59

## 2019-01-01 RX ADMIN — TAMSULOSIN HYDROCHLORIDE 0.8 MG: 0.4 CAPSULE ORAL at 10:30

## 2019-01-01 RX ADMIN — POTASSIUM CHLORIDE 60 MEQ: 750 TABLET, FILM COATED, EXTENDED RELEASE ORAL at 08:43

## 2019-01-01 RX ADMIN — ONDANSETRON 4 MG: 2 INJECTION INTRAMUSCULAR; INTRAVENOUS at 12:54

## 2019-01-01 RX ADMIN — MILRINONE LACTATE IN DEXTROSE 0.2 MCG/KG/MIN: 200 INJECTION, SOLUTION INTRAVENOUS at 01:35

## 2019-01-01 RX ADMIN — THERA TABS 1 TABLET: TAB at 08:27

## 2019-01-01 RX ADMIN — BUDESONIDE 500 MCG: 0.5 INHALANT RESPIRATORY (INHALATION) at 20:22

## 2019-01-01 RX ADMIN — MILRINONE LACTATE IN DEXTROSE 0.2 MCG/KG/MIN: 200 INJECTION, SOLUTION INTRAVENOUS at 23:44

## 2019-01-01 RX ADMIN — OCTREOTIDE ACETATE 25 MCG: 50 INJECTION, SOLUTION INTRAVENOUS; SUBCUTANEOUS at 17:06

## 2019-01-01 RX ADMIN — CASTOR OIL AND BALSAM, PERU: 788; 87 OINTMENT TOPICAL at 21:32

## 2019-01-01 RX ADMIN — SUCRALFATE 1 G: 1 TABLET ORAL at 07:22

## 2019-01-01 RX ADMIN — ASPIRIN 81 MG: 81 TABLET, COATED ORAL at 08:09

## 2019-01-01 RX ADMIN — ALTEPLASE 1 MG: 2.2 INJECTION, POWDER, LYOPHILIZED, FOR SOLUTION INTRAVENOUS at 15:20

## 2019-01-01 RX ADMIN — CASTOR OIL AND BALSAM, PERU: 788; 87 OINTMENT TOPICAL at 16:04

## 2019-01-01 RX ADMIN — IPRATROPIUM BROMIDE AND ALBUTEROL SULFATE 3 ML: .5; 3 SOLUTION RESPIRATORY (INHALATION) at 00:45

## 2019-01-01 RX ADMIN — ACETAMINOPHEN 650 MG: 325 TABLET, FILM COATED ORAL at 04:45

## 2019-01-01 RX ADMIN — CEFEPIME HYDROCHLORIDE 2 G: 2 INJECTION, POWDER, FOR SOLUTION INTRAVENOUS at 21:06

## 2019-01-01 RX ADMIN — TAMSULOSIN HYDROCHLORIDE 0.4 MG: 0.4 CAPSULE ORAL at 09:25

## 2019-01-01 RX ADMIN — CEFEPIME HYDROCHLORIDE 2 G: 2 INJECTION, POWDER, FOR SOLUTION INTRAVENOUS at 12:14

## 2019-01-01 RX ADMIN — CHLORHEXIDINE GLUCONATE 10 ML: 1.2 RINSE ORAL at 08:39

## 2019-01-01 RX ADMIN — ALBUMIN (HUMAN) 12.5 G: 12.5 INJECTION, SOLUTION INTRAVENOUS at 08:09

## 2019-01-01 RX ADMIN — MILRINONE LACTATE IN DEXTROSE 0.2 MCG/KG/MIN: 200 INJECTION, SOLUTION INTRAVENOUS at 06:37

## 2019-01-01 RX ADMIN — MAGNESIUM OXIDE TAB 400 MG (241.3 MG ELEMENTAL MG) 400 MG: 400 (241.3 MG) TAB at 17:11

## 2019-01-01 RX ADMIN — IPRATROPIUM BROMIDE AND ALBUTEROL SULFATE 3 ML: .5; 3 SOLUTION RESPIRATORY (INHALATION) at 19:38

## 2019-01-01 RX ADMIN — Medication 2 G: at 23:32

## 2019-01-01 RX ADMIN — ESCITALOPRAM OXALATE 10 MG: 10 TABLET ORAL at 18:43

## 2019-01-01 RX ADMIN — OXYMETAZOLINE HYDROCHLORIDE 2 SPRAY: 0.05 SPRAY NASAL at 13:13

## 2019-01-01 RX ADMIN — SUCRALFATE 1 G: 1 TABLET ORAL at 22:39

## 2019-01-01 RX ADMIN — PIPERACILLIN SODIUM AND TAZOBACTAM SODIUM 3.38 G: 3; .375 INJECTION, POWDER, LYOPHILIZED, FOR SOLUTION INTRAVENOUS at 01:26

## 2019-01-01 RX ADMIN — SUCRALFATE 1 G: 1 TABLET ORAL at 12:02

## 2019-01-01 RX ADMIN — ARFORMOTEROL TARTRATE 15 MCG: 15 SOLUTION RESPIRATORY (INHALATION) at 22:24

## 2019-01-01 RX ADMIN — PIPERACILLIN AND TAZOBACTAM 3.38 G: 3; .375 INJECTION, POWDER, FOR SOLUTION INTRAVENOUS at 00:05

## 2019-01-01 RX ADMIN — POTASSIUM CHLORIDE 60 MEQ: 750 TABLET, FILM COATED, EXTENDED RELEASE ORAL at 08:50

## 2019-01-01 RX ADMIN — IPRATROPIUM BROMIDE AND ALBUTEROL SULFATE 3 ML: .5; 3 SOLUTION RESPIRATORY (INHALATION) at 16:03

## 2019-01-01 RX ADMIN — PIPERACILLIN SODIUM AND TAZOBACTAM SODIUM 3.38 G: 3; .375 INJECTION, POWDER, LYOPHILIZED, FOR SOLUTION INTRAVENOUS at 09:13

## 2019-01-01 RX ADMIN — ARFORMOTEROL TARTRATE 15 MCG: 15 SOLUTION RESPIRATORY (INHALATION) at 20:20

## 2019-01-01 RX ADMIN — BUMETANIDE 2 MG: 0.25 INJECTION INTRAMUSCULAR; INTRAVENOUS at 17:05

## 2019-01-01 RX ADMIN — MAGNESIUM SULFATE HEPTAHYDRATE 1 G: 1 INJECTION, SOLUTION INTRAVENOUS at 05:43

## 2019-01-01 RX ADMIN — SUCRALFATE 1 G: 1 TABLET ORAL at 14:33

## 2019-01-01 RX ADMIN — ARFORMOTEROL TARTRATE 15 MCG: 15 SOLUTION RESPIRATORY (INHALATION) at 10:21

## 2019-01-01 RX ADMIN — DOCUSATE SODIUM AND SENNOSIDES 1 TABLET: 50; 8.6 TABLET, FILM COATED ORAL at 17:05

## 2019-01-01 RX ADMIN — IRON SUCROSE 300 MG: 20 INJECTION, SOLUTION INTRAVENOUS at 09:45

## 2019-01-01 RX ADMIN — POTASSIUM CHLORIDE 60 MEQ: 750 TABLET, FILM COATED, EXTENDED RELEASE ORAL at 09:24

## 2019-01-01 RX ADMIN — POTASSIUM CHLORIDE 20 MEQ: 400 INJECTION, SOLUTION INTRAVENOUS at 07:04

## 2019-01-01 RX ADMIN — POTASSIUM CHLORIDE 20 MEQ: 400 INJECTION, SOLUTION INTRAVENOUS at 18:14

## 2019-01-01 RX ADMIN — MUPIROCIN: 20 OINTMENT TOPICAL at 18:56

## 2019-01-01 RX ADMIN — WARFARIN SODIUM 5 MG: 5 TABLET ORAL at 17:05

## 2019-01-01 RX ADMIN — PROPOFOL 20 MG: 10 INJECTION, EMULSION INTRAVENOUS at 13:11

## 2019-01-01 RX ADMIN — SENNOSIDES AND DOCUSATE SODIUM 1 TABLET: 8.6; 5 TABLET ORAL at 08:28

## 2019-01-01 RX ADMIN — SUCRALFATE 1 G: 1 TABLET ORAL at 17:17

## 2019-01-01 RX ADMIN — MAGNESIUM OXIDE TAB 400 MG (241.3 MG ELEMENTAL MG) 400 MG: 400 (241.3 MG) TAB at 17:40

## 2019-01-01 RX ADMIN — POLYETHYLENE GLYCOL 3350 17 G: 17 POWDER, FOR SOLUTION ORAL at 11:04

## 2019-01-01 RX ADMIN — WARFARIN SODIUM 2 MG: 2 TABLET ORAL at 16:56

## 2019-01-01 RX ADMIN — ONDANSETRON 4 MG: 2 INJECTION INTRAMUSCULAR; INTRAVENOUS at 08:15

## 2019-01-01 RX ADMIN — POTASSIUM CHLORIDE 20 MEQ: 400 INJECTION, SOLUTION INTRAVENOUS at 07:13

## 2019-01-01 RX ADMIN — OXYCODONE 5 MG: 5 TABLET ORAL at 12:10

## 2019-01-01 RX ADMIN — ASPIRIN 81 MG: 81 TABLET, COATED ORAL at 10:30

## 2019-01-01 RX ADMIN — Medication 2 G: at 07:58

## 2019-01-01 RX ADMIN — SUCRALFATE 1 G: 1 TABLET ORAL at 12:17

## 2019-01-01 RX ADMIN — MAGNESIUM OXIDE TAB 400 MG (241.3 MG ELEMENTAL MG) 400 MG: 400 (241.3 MG) TAB at 18:32

## 2019-01-01 RX ADMIN — BUDESONIDE 500 MCG: 0.5 INHALANT RESPIRATORY (INHALATION) at 22:13

## 2019-01-01 RX ADMIN — SODIUM CHLORIDE 9 ML/HR: 900 INJECTION, SOLUTION INTRAVENOUS at 14:15

## 2019-01-01 RX ADMIN — ALBUMIN (HUMAN) 12.5 G: 0.25 INJECTION, SOLUTION INTRAVENOUS at 17:16

## 2019-01-01 RX ADMIN — MIRTAZAPINE 15 MG: 15 TABLET, FILM COATED ORAL at 21:09

## 2019-01-01 RX ADMIN — ESCITALOPRAM OXALATE 10 MG: 10 TABLET ORAL at 17:57

## 2019-01-01 RX ADMIN — CHLORHEXIDINE GLUCONATE 10 ML: 1.2 RINSE ORAL at 22:31

## 2019-01-01 RX ADMIN — OXYBUTYNIN CHLORIDE 5 MG: 5 TABLET ORAL at 11:37

## 2019-01-01 RX ADMIN — OCTREOTIDE ACETATE 25 MCG: 50 INJECTION, SOLUTION INTRAVENOUS; SUBCUTANEOUS at 21:29

## 2019-01-01 RX ADMIN — IRON SUCROSE 300 MG: 20 INJECTION, SOLUTION INTRAVENOUS at 09:10

## 2019-01-01 RX ADMIN — SUCRALFATE 1 G: 1 TABLET ORAL at 13:23

## 2019-01-01 RX ADMIN — BUDESONIDE 500 MCG: 0.5 INHALANT RESPIRATORY (INHALATION) at 21:10

## 2019-01-01 RX ADMIN — Medication 2 G: at 09:14

## 2019-01-01 RX ADMIN — DIPHENHYDRAMINE HYDROCHLORIDE 25 MG: 25 CAPSULE ORAL at 18:19

## 2019-01-01 RX ADMIN — POTASSIUM CHLORIDE 20 MEQ: 29.8 INJECTION, SOLUTION INTRAVENOUS at 17:35

## 2019-01-01 RX ADMIN — INSULIN LISPRO 2 UNITS: 100 INJECTION, SOLUTION INTRAVENOUS; SUBCUTANEOUS at 07:26

## 2019-01-01 RX ADMIN — STANDARDIZED SENNA CONCENTRATE AND DOCUSATE SODIUM 1 TABLET: 8.6; 5 TABLET ORAL at 08:39

## 2019-01-01 RX ADMIN — IPRATROPIUM BROMIDE AND ALBUTEROL SULFATE 3 ML: .5; 3 SOLUTION RESPIRATORY (INHALATION) at 03:24

## 2019-01-01 RX ADMIN — LEVETIRACETAM 250 MG: 100 SOLUTION ORAL at 07:22

## 2019-01-01 RX ADMIN — IPRATROPIUM BROMIDE AND ALBUTEROL SULFATE 3 ML: .5; 3 SOLUTION RESPIRATORY (INHALATION) at 11:08

## 2019-01-01 RX ADMIN — IPRATROPIUM BROMIDE AND ALBUTEROL SULFATE 3 ML: .5; 3 SOLUTION RESPIRATORY (INHALATION) at 13:05

## 2019-01-01 RX ADMIN — CASTOR OIL AND BALSAM, PERU: 788; 87 OINTMENT TOPICAL at 21:11

## 2019-01-01 RX ADMIN — PROPOFOL 30 MG: 10 INJECTION, EMULSION INTRAVENOUS at 15:59

## 2019-01-01 RX ADMIN — POLYETHYLENE GLYCOL 3350 17 G: 17 POWDER, FOR SOLUTION ORAL at 09:10

## 2019-01-01 RX ADMIN — OXYCODONE HYDROCHLORIDE 5 MG: 5 TABLET ORAL at 07:02

## 2019-01-01 RX ADMIN — BUDESONIDE 500 MCG: 0.5 INHALANT RESPIRATORY (INHALATION) at 07:32

## 2019-01-01 RX ADMIN — AMIODARONE HYDROCHLORIDE 100 MG: 200 TABLET ORAL at 09:31

## 2019-01-01 RX ADMIN — FINASTERIDE 5 MG: 5 TABLET, FILM COATED ORAL at 09:23

## 2019-01-01 RX ADMIN — Medication 10 ML: at 15:04

## 2019-01-01 RX ADMIN — DOCUSATE SODIUM AND SENNOSIDES 1 TABLET: 50; 8.6 TABLET, FILM COATED ORAL at 18:47

## 2019-01-01 RX ADMIN — SUCRALFATE 1 G: 1 TABLET ORAL at 21:24

## 2019-01-01 RX ADMIN — ACETAMINOPHEN 650 MG: 325 TABLET, FILM COATED ORAL at 13:17

## 2019-01-01 RX ADMIN — LEVETIRACETAM 250 MG: 250 TABLET ORAL at 18:23

## 2019-01-01 RX ADMIN — BUMETANIDE 2 MG: 0.25 INJECTION INTRAMUSCULAR; INTRAVENOUS at 08:54

## 2019-01-01 RX ADMIN — Medication 10 ML: at 18:02

## 2019-01-01 RX ADMIN — TAMSULOSIN HYDROCHLORIDE 0.4 MG: 0.4 CAPSULE ORAL at 08:39

## 2019-01-01 RX ADMIN — BUMETANIDE 2 MG: 0.25 INJECTION INTRAMUSCULAR; INTRAVENOUS at 09:01

## 2019-01-01 RX ADMIN — SUCRALFATE 1 G: 1 TABLET ORAL at 07:31

## 2019-01-01 RX ADMIN — ESCITALOPRAM OXALATE 5 MG: 10 TABLET ORAL at 08:51

## 2019-01-01 RX ADMIN — IPRATROPIUM BROMIDE AND ALBUTEROL SULFATE 3 ML: .5; 3 SOLUTION RESPIRATORY (INHALATION) at 15:11

## 2019-01-01 RX ADMIN — SILDENAFIL 40 MG: 20 TABLET ORAL at 08:38

## 2019-01-01 RX ADMIN — SUCRALFATE 1 G: 1 TABLET ORAL at 06:37

## 2019-01-01 RX ADMIN — BUMETANIDE 2 MG: 0.25 INJECTION INTRAMUSCULAR; INTRAVENOUS at 10:56

## 2019-01-01 RX ADMIN — PIPERACILLIN SODIUM AND TAZOBACTAM SODIUM 3.38 G: 3; .375 INJECTION, POWDER, LYOPHILIZED, FOR SOLUTION INTRAVENOUS at 08:17

## 2019-01-01 RX ADMIN — TAMSULOSIN HYDROCHLORIDE 0.8 MG: 0.4 CAPSULE ORAL at 09:06

## 2019-01-01 RX ADMIN — SILDENAFIL CITRATE 20 MG: 20 TABLET ORAL at 09:26

## 2019-01-01 RX ADMIN — CEFEPIME 2 G: 2 INJECTION, POWDER, FOR SOLUTION INTRAVENOUS at 18:29

## 2019-01-01 RX ADMIN — BUMETANIDE 2 MG: 0.25 INJECTION INTRAMUSCULAR; INTRAVENOUS at 06:48

## 2019-01-01 RX ADMIN — SODIUM CHLORIDE 2.3 UNITS/HR: 9 INJECTION, SOLUTION INTRAVENOUS at 10:16

## 2019-01-01 RX ADMIN — BIVALIRUDIN 0.22 MG/KG/HR: 250 INJECTION, POWDER, LYOPHILIZED, FOR SOLUTION INTRAVENOUS at 06:34

## 2019-01-01 RX ADMIN — BUMETANIDE 0.5 MG/HR: 0.25 INJECTION INTRAMUSCULAR; INTRAVENOUS at 13:05

## 2019-01-01 RX ADMIN — Medication 10 ML: at 15:29

## 2019-01-01 RX ADMIN — POTASSIUM CHLORIDE 60 MEQ: 750 TABLET, FILM COATED, EXTENDED RELEASE ORAL at 17:09

## 2019-01-01 RX ADMIN — DOBUTAMINE HYDROCHLORIDE 4 MCG/KG/MIN: 400 INJECTION INTRAVENOUS at 08:53

## 2019-01-01 RX ADMIN — SILDENAFIL CITRATE 20 MG: 20 TABLET, FILM COATED ORAL at 01:45

## 2019-01-01 RX ADMIN — SUCRALFATE 1 G: 1 TABLET ORAL at 07:47

## 2019-01-01 RX ADMIN — BIVALIRUDIN 17.6 ML/HR: 250 INJECTION, POWDER, LYOPHILIZED, FOR SOLUTION INTRAVENOUS at 11:08

## 2019-01-01 RX ADMIN — TAMSULOSIN HYDROCHLORIDE 0.4 MG: 0.4 CAPSULE ORAL at 09:16

## 2019-01-01 RX ADMIN — DRONABINOL 5 MG: 2.5 CAPSULE ORAL at 07:20

## 2019-01-01 RX ADMIN — POTASSIUM CHLORIDE 20 MEQ: 400 INJECTION, SOLUTION INTRAVENOUS at 06:35

## 2019-01-01 RX ADMIN — OXYCODONE HYDROCHLORIDE 10 MG: 5 TABLET ORAL at 10:44

## 2019-01-01 RX ADMIN — EPINEPHRINE 5 MCG/MIN: 1 INJECTION PARENTERAL at 11:13

## 2019-01-01 RX ADMIN — ARFORMOTEROL TARTRATE 15 MCG: 15 SOLUTION RESPIRATORY (INHALATION) at 20:11

## 2019-01-01 RX ADMIN — THERA TABS 1 TABLET: TAB at 09:23

## 2019-01-01 RX ADMIN — SENNOSIDES AND DOCUSATE SODIUM 1 TABLET: 8.6; 5 TABLET ORAL at 08:42

## 2019-01-01 RX ADMIN — SUCRALFATE 1 G: 1 TABLET ORAL at 18:03

## 2019-01-01 RX ADMIN — DRONABINOL 2.5 MG: 2.5 CAPSULE ORAL at 07:21

## 2019-01-01 RX ADMIN — MILRINONE LACTATE IN DEXTROSE 0.3 MCG/KG/MIN: 200 INJECTION, SOLUTION INTRAVENOUS at 08:25

## 2019-01-01 RX ADMIN — Medication 1 CAPSULE: at 08:31

## 2019-01-01 RX ADMIN — POTASSIUM CHLORIDE 20 MEQ: 400 INJECTION, SOLUTION INTRAVENOUS at 21:52

## 2019-01-01 RX ADMIN — OCTREOTIDE ACETATE 25 MCG: 50 INJECTION, SOLUTION INTRAVENOUS; SUBCUTANEOUS at 11:04

## 2019-01-01 RX ADMIN — CEFEPIME HYDROCHLORIDE 2 G: 2 INJECTION, POWDER, FOR SOLUTION INTRAVENOUS at 12:49

## 2019-01-01 RX ADMIN — BUMETANIDE 2 MG: 0.25 INJECTION INTRAMUSCULAR; INTRAVENOUS at 08:52

## 2019-01-01 RX ADMIN — POTASSIUM CHLORIDE 20 MEQ: 750 TABLET, FILM COATED, EXTENDED RELEASE ORAL at 08:49

## 2019-01-01 RX ADMIN — OCTREOTIDE ACETATE 25 MCG: 50 INJECTION, SOLUTION INTRAVENOUS; SUBCUTANEOUS at 22:09

## 2019-01-01 RX ADMIN — PHYTONADIONE 10 MG: 10 INJECTION, EMULSION INTRAMUSCULAR; INTRAVENOUS; SUBCUTANEOUS at 13:14

## 2019-01-01 RX ADMIN — CEFEPIME 2 G: 2 INJECTION, POWDER, FOR SOLUTION INTRAVENOUS at 09:38

## 2019-01-01 RX ADMIN — POLYETHYLENE GLYCOL 3350 17 G: 17 POWDER, FOR SOLUTION ORAL at 08:09

## 2019-01-01 RX ADMIN — POTASSIUM CHLORIDE 20 MEQ: 29.8 INJECTION, SOLUTION INTRAVENOUS at 09:11

## 2019-01-01 RX ADMIN — MAGNESIUM OXIDE TAB 400 MG (241.3 MG ELEMENTAL MG) 400 MG: 400 (241.3 MG) TAB at 17:01

## 2019-01-01 RX ADMIN — ARFORMOTEROL TARTRATE 15 MCG: 15 SOLUTION RESPIRATORY (INHALATION) at 07:32

## 2019-01-01 RX ADMIN — FINASTERIDE 5 MG: 5 TABLET, FILM COATED ORAL at 08:43

## 2019-01-01 RX ADMIN — Medication 10 ML: at 22:06

## 2019-01-01 RX ADMIN — BUMETANIDE 0.5 MG/HR: 0.25 INJECTION INTRAMUSCULAR; INTRAVENOUS at 11:42

## 2019-01-01 RX ADMIN — PANTOPRAZOLE SODIUM 40 MG: 40 TABLET, DELAYED RELEASE ORAL at 07:18

## 2019-01-01 RX ADMIN — DOCUSATE SODIUM AND SENNOSIDES 1 TABLET: 50; 8.6 TABLET, FILM COATED ORAL at 18:32

## 2019-01-01 RX ADMIN — MAGNESIUM SULFATE HEPTAHYDRATE 1 G: 1 INJECTION, SOLUTION INTRAVENOUS at 10:54

## 2019-01-01 RX ADMIN — CASTOR OIL AND BALSAM, PERU: 788; 87 OINTMENT TOPICAL at 08:55

## 2019-01-01 RX ADMIN — SILDENAFIL CITRATE 20 MG: 20 TABLET, FILM COATED ORAL at 10:24

## 2019-01-01 RX ADMIN — INSULIN LISPRO 2 UNITS: 100 INJECTION, SOLUTION INTRAVENOUS; SUBCUTANEOUS at 11:28

## 2019-01-01 RX ADMIN — NOREPINEPHRINE BITARTRATE 5 MCG/MIN: 1 INJECTION, SOLUTION, CONCENTRATE INTRAVENOUS at 10:55

## 2019-01-01 RX ADMIN — SENNOSIDES AND DOCUSATE SODIUM 1 TABLET: 8.6; 5 TABLET ORAL at 17:32

## 2019-01-01 RX ADMIN — Medication 10 ML: at 16:31

## 2019-01-01 RX ADMIN — SILDENAFIL CITRATE 20 MG: 20 TABLET ORAL at 17:17

## 2019-01-01 RX ADMIN — Medication 2 G: at 00:12

## 2019-01-01 RX ADMIN — SUCRALFATE 1 G: 1 TABLET ORAL at 16:47

## 2019-01-01 RX ADMIN — SILDENAFIL CITRATE 20 MG: 20 TABLET ORAL at 16:05

## 2019-01-01 RX ADMIN — SALINE NASAL SPRAY 2 SPRAY: 1.5 SOLUTION NASAL at 08:05

## 2019-01-01 RX ADMIN — Medication 1 PACKET: at 12:14

## 2019-01-01 RX ADMIN — CASTOR OIL AND BALSAM, PERU: 788; 87 OINTMENT TOPICAL at 21:09

## 2019-01-01 RX ADMIN — AMIODARONE HYDROCHLORIDE 100 MG: 200 TABLET ORAL at 08:28

## 2019-01-01 RX ADMIN — SILDENAFIL 40 MG: 20 TABLET ORAL at 17:32

## 2019-01-01 RX ADMIN — Medication 10 ML: at 07:21

## 2019-01-01 RX ADMIN — Medication 2 G: at 12:15

## 2019-01-01 RX ADMIN — SUCRALFATE 1 G: 1 TABLET ORAL at 11:23

## 2019-01-01 RX ADMIN — MORPHINE SULFATE 4 MG: 4 INJECTION, SOLUTION INTRAMUSCULAR; INTRAVENOUS at 00:07

## 2019-01-01 RX ADMIN — CASTOR OIL AND BALSAM, PERU: 788; 87 OINTMENT TOPICAL at 09:17

## 2019-01-01 RX ADMIN — SILDENAFIL CITRATE 20 MG: 20 TABLET ORAL at 17:45

## 2019-01-01 RX ADMIN — POTASSIUM CHLORIDE 20 MEQ: 400 INJECTION, SOLUTION INTRAVENOUS at 10:10

## 2019-01-01 RX ADMIN — PIPERACILLIN AND TAZOBACTAM 3.38 G: 3; .375 INJECTION, POWDER, FOR SOLUTION INTRAVENOUS at 00:21

## 2019-01-01 RX ADMIN — DIGOXIN 0.12 MG: 125 TABLET ORAL at 12:23

## 2019-01-01 RX ADMIN — POTASSIUM CHLORIDE 20 MEQ: 29.8 INJECTION, SOLUTION INTRAVENOUS at 18:00

## 2019-01-01 RX ADMIN — SENNOSIDES AND DOCUSATE SODIUM 1 TABLET: 8.6; 5 TABLET ORAL at 18:00

## 2019-01-01 RX ADMIN — OXYCODONE HYDROCHLORIDE 10 MG: 5 TABLET ORAL at 22:54

## 2019-01-01 RX ADMIN — ASPIRIN 81 MG 81 MG: 81 TABLET ORAL at 08:08

## 2019-01-01 RX ADMIN — SALINE NASAL SPRAY 2 SPRAY: 1.5 SOLUTION NASAL at 17:26

## 2019-01-01 RX ADMIN — IPRATROPIUM BROMIDE AND ALBUTEROL SULFATE 3 ML: .5; 3 SOLUTION RESPIRATORY (INHALATION) at 20:20

## 2019-01-01 RX ADMIN — PANTOPRAZOLE SODIUM 40 MG: 40 TABLET, DELAYED RELEASE ORAL at 06:56

## 2019-01-01 RX ADMIN — POTASSIUM CHLORIDE 60 MEQ: 750 TABLET, FILM COATED, EXTENDED RELEASE ORAL at 17:01

## 2019-01-01 RX ADMIN — SUCRALFATE 1 G: 1 TABLET ORAL at 21:52

## 2019-01-01 RX ADMIN — DEXTROSE MONOHYDRATE 18.3 ML/HR: 5 INJECTION, SOLUTION INTRAVENOUS at 07:13

## 2019-01-01 RX ADMIN — FINASTERIDE 5 MG: 5 TABLET, FILM COATED ORAL at 08:27

## 2019-01-01 RX ADMIN — LEVETIRACETAM 250 MG: 100 SOLUTION ORAL at 18:30

## 2019-01-01 RX ADMIN — MILRINONE LACTATE IN DEXTROSE 0.1 MCG/KG/MIN: 200 INJECTION, SOLUTION INTRAVENOUS at 13:23

## 2019-01-01 RX ADMIN — CASTOR OIL AND BALSAM, PERU: 788; 87 OINTMENT TOPICAL at 09:29

## 2019-01-01 RX ADMIN — CHLORHEXIDINE GLUCONATE 10 ML: 1.2 RINSE ORAL at 18:46

## 2019-01-01 RX ADMIN — LEVOFLOXACIN 500 MG: 5 INJECTION, SOLUTION INTRAVENOUS at 05:10

## 2019-01-01 RX ADMIN — DRONABINOL 2.5 MG: 2.5 CAPSULE ORAL at 17:40

## 2019-01-01 RX ADMIN — LEVETIRACETAM 250 MG: 100 INJECTION, SOLUTION, CONCENTRATE INTRAVENOUS at 06:21

## 2019-01-01 RX ADMIN — DOBUTAMINE IN DEXTROSE 10 MCG/KG/MIN: 200 INJECTION, SOLUTION INTRAVENOUS at 11:13

## 2019-01-01 RX ADMIN — SODIUM CHLORIDE 9 ML/HR: 900 INJECTION, SOLUTION INTRAVENOUS at 07:30

## 2019-01-01 RX ADMIN — DEXMEDETOMIDINE HYDROCHLORIDE 0.9 MCG/KG/HR: 100 INJECTION, SOLUTION, CONCENTRATE INTRAVENOUS at 21:54

## 2019-01-01 RX ADMIN — BUMETANIDE 2 MG: 0.25 INJECTION INTRAMUSCULAR; INTRAVENOUS at 08:28

## 2019-01-01 RX ADMIN — BUMETANIDE 0.5 MG/HR: 0.25 INJECTION INTRAMUSCULAR; INTRAVENOUS at 04:57

## 2019-01-01 RX ADMIN — CEFEPIME 2 G: 2 INJECTION, POWDER, FOR SOLUTION INTRAVENOUS at 02:30

## 2019-01-01 RX ADMIN — DIGOXIN 0.25 MG: 250 TABLET ORAL at 08:07

## 2019-01-01 RX ADMIN — BUDESONIDE 500 MCG: 0.5 INHALANT RESPIRATORY (INHALATION) at 08:23

## 2019-01-01 RX ADMIN — MAGNESIUM SULFATE HEPTAHYDRATE 1 G: 1 INJECTION, SOLUTION INTRAVENOUS at 06:43

## 2019-01-01 RX ADMIN — OCTREOTIDE ACETATE 25 MCG: 50 INJECTION, SOLUTION INTRAVENOUS; SUBCUTANEOUS at 09:33

## 2019-01-01 RX ADMIN — SUCRALFATE 1 G: 1 TABLET ORAL at 17:18

## 2019-01-01 RX ADMIN — OXYMETAZOLINE HYDROCHLORIDE 2 SPRAY: 0.05 SPRAY NASAL at 08:14

## 2019-01-01 RX ADMIN — OCTREOTIDE ACETATE 25 MCG: 50 INJECTION, SOLUTION INTRAVENOUS; SUBCUTANEOUS at 17:30

## 2019-01-01 RX ADMIN — OCTREOTIDE ACETATE 25 MCG: 50 INJECTION, SOLUTION INTRAVENOUS; SUBCUTANEOUS at 17:18

## 2019-04-02 ENCOUNTER — ANESTHESIA (OUTPATIENT)
Dept: CARDIAC CATH/INVASIVE PROCEDURES | Age: 69
End: 2019-04-02
Payer: MEDICARE

## 2019-04-02 ENCOUNTER — ANESTHESIA EVENT (OUTPATIENT)
Dept: CARDIAC CATH/INVASIVE PROCEDURES | Age: 69
End: 2019-04-02
Payer: MEDICARE

## 2019-04-02 ENCOUNTER — HOSPITAL ENCOUNTER (OUTPATIENT)
Dept: CARDIAC CATH/INVASIVE PROCEDURES | Age: 69
Discharge: HOME | End: 2019-04-02
Attending: INTERNAL MEDICINE | Admitting: INTERNAL MEDICINE
Payer: MEDICARE

## 2019-04-02 VITALS
WEIGHT: 215 LBS | DIASTOLIC BLOOD PRESSURE: 75 MMHG | RESPIRATION RATE: 20 BRPM | TEMPERATURE: 97.8 F | HEIGHT: 74 IN | OXYGEN SATURATION: 100 % | BODY MASS INDEX: 27.59 KG/M2 | HEART RATE: 75 BPM | SYSTOLIC BLOOD PRESSURE: 101 MMHG

## 2019-04-02 DIAGNOSIS — I48.0 AF (PAROXYSMAL ATRIAL FIBRILLATION) (HCC): ICD-10-CM

## 2019-04-02 PROCEDURE — 76060000032 HC ANESTHESIA 0.5 TO 1 HR: Performed by: INTERNAL MEDICINE

## 2019-04-02 PROCEDURE — 93325 DOPPLER ECHO COLOR FLOW MAPG: CPT

## 2019-04-02 PROCEDURE — 77030039046 HC PAD DEFIB RADIOTRNSPNT CNMD -B: Performed by: INTERNAL MEDICINE

## 2019-04-02 PROCEDURE — 74011250636 HC RX REV CODE- 250/636

## 2019-04-02 PROCEDURE — 74011250636 HC RX REV CODE- 250/636: Performed by: INTERNAL MEDICINE

## 2019-04-02 RX ORDER — CARVEDILOL 12.5 MG/1
12.5 TABLET ORAL 2 TIMES DAILY WITH MEALS
Status: ON HOLD | COMMUNITY
End: 2019-06-26 | Stop reason: SDUPTHER

## 2019-04-02 RX ORDER — FUROSEMIDE 80 MG/1
80 TABLET ORAL DAILY
Status: ON HOLD | COMMUNITY
End: 2019-05-31

## 2019-04-02 RX ORDER — SODIUM CHLORIDE 9 MG/ML
25 INJECTION, SOLUTION INTRAVENOUS CONTINUOUS
Status: DISCONTINUED | OUTPATIENT
Start: 2019-04-02 | End: 2019-04-02 | Stop reason: HOSPADM

## 2019-04-02 RX ORDER — ATROPINE SULFATE 0.1 MG/ML
.5-1 INJECTION INTRAVENOUS AS NEEDED
Status: DISCONTINUED | OUTPATIENT
Start: 2019-04-02 | End: 2019-04-02

## 2019-04-02 RX ORDER — SODIUM CHLORIDE 0.9 % (FLUSH) 0.9 %
5-10 SYRINGE (ML) INJECTION AS NEEDED
Status: DISCONTINUED | OUTPATIENT
Start: 2019-04-02 | End: 2019-04-02

## 2019-04-02 RX ORDER — PHENYLEPHRINE HCL IN 0.9% NACL 0.4MG/10ML
SYRINGE (ML) INTRAVENOUS AS NEEDED
Status: DISCONTINUED | OUTPATIENT
Start: 2019-04-02 | End: 2019-04-02 | Stop reason: HOSPADM

## 2019-04-02 RX ORDER — ACETAMINOPHEN 325 MG/1
650 TABLET ORAL
Status: ON HOLD | COMMUNITY
End: 2019-05-01

## 2019-04-02 RX ORDER — SODIUM CHLORIDE, SODIUM LACTATE, POTASSIUM CHLORIDE, CALCIUM CHLORIDE 600; 310; 30; 20 MG/100ML; MG/100ML; MG/100ML; MG/100ML
INJECTION, SOLUTION INTRAVENOUS
Status: DISCONTINUED | OUTPATIENT
Start: 2019-04-02 | End: 2019-04-02 | Stop reason: HOSPADM

## 2019-04-02 RX ORDER — SPIRONOLACTONE 25 MG/1
25 TABLET ORAL DAILY
COMMUNITY
End: 2020-01-01

## 2019-04-02 RX ORDER — PROPOFOL 10 MG/ML
INJECTION, EMULSION INTRAVENOUS AS NEEDED
Status: DISCONTINUED | OUTPATIENT
Start: 2019-04-02 | End: 2019-04-02 | Stop reason: HOSPADM

## 2019-04-02 RX ORDER — ASPIRIN 81 MG/1
81 TABLET ORAL DAILY
COMMUNITY
End: 2020-01-01 | Stop reason: SDUPTHER

## 2019-04-02 RX ADMIN — PROPOFOL 40 MG: 10 INJECTION, EMULSION INTRAVENOUS at 08:30

## 2019-04-02 RX ADMIN — PROPOFOL 30 MG: 10 INJECTION, EMULSION INTRAVENOUS at 08:42

## 2019-04-02 RX ADMIN — SODIUM CHLORIDE 25 ML/HR: 900 INJECTION, SOLUTION INTRAVENOUS at 08:00

## 2019-04-02 RX ADMIN — SODIUM CHLORIDE, SODIUM LACTATE, POTASSIUM CHLORIDE, CALCIUM CHLORIDE: 600; 310; 30; 20 INJECTION, SOLUTION INTRAVENOUS at 08:22

## 2019-04-02 RX ADMIN — PROPOFOL 30 MG: 10 INJECTION, EMULSION INTRAVENOUS at 08:45

## 2019-04-02 RX ADMIN — PROPOFOL 40 MG: 10 INJECTION, EMULSION INTRAVENOUS at 08:34

## 2019-04-02 RX ADMIN — Medication 80 MCG: at 08:38

## 2019-04-02 RX ADMIN — SODIUM CHLORIDE 10 ML/HR: 900 INJECTION, SOLUTION INTRAVENOUS at 09:00

## 2019-04-02 RX ADMIN — PROPOFOL 40 MG: 10 INJECTION, EMULSION INTRAVENOUS at 08:31

## 2019-04-02 RX ADMIN — PROPOFOL 30 MG: 10 INJECTION, EMULSION INTRAVENOUS at 08:39

## 2019-04-02 RX ADMIN — Medication 120 MCG: at 08:40

## 2019-04-02 RX ADMIN — PROPOFOL 30 MG: 10 INJECTION, EMULSION INTRAVENOUS at 08:37

## 2019-04-02 RX ADMIN — Medication 80 MCG: at 08:48

## 2019-04-02 RX ADMIN — Medication 80 MCG: at 08:52

## 2019-04-02 NOTE — PROCEDURES
JACQUE Prelim note Pt underwent JACQUE after giving informed consent He was NPO Sedation provided by anesthesia with total of 240mg Propofol Probe inserted without difficulty No complications noted Findings: 
Smoke in the ANDREA with small mobile thrombus. EF severely reduced In summary: ANDREA clot. No cardioversion today. Con't anticoagulation Denise Michael MD

## 2019-04-02 NOTE — DISCHARGE INSTRUCTIONS
Patient Education        Deciding Between Electrical Cardioversion and Rate Control Medicines for Atrial Fibrillation  How can you decide between electrical cardioversion and rate control medicines for atrial fibrillation? What is atrial fibrillation? Atrial fibrillation (say \"AY-tree-jade efg-zjzl-JVV-shun\") is a kind of uneven heartbeat. It can make you feel lightheaded and dizzy. You may feel weak. It also can make you more likely to have a stroke. Electrical cardioversion can return your heart to a normal rhythm. First you'll get medicines to make you sleepy and control pain. Then your doctor will use patches to send an electric current to your heart. This resets the rhythm of your heart. Not everyone with atrial fibrillation needs this treatment. For some people, taking medicines may be better. Most people can live with an uneven heartbeat. It just has to be kept under control so the heart does not beat too fast.  Use this information to help you and your doctor decide which treatment to choose for atrial fibrillation. What are childress points about this decision? · Electrical cardioversion can return your heart to a normal rhythm. But the problem can come back. The longer you have had atrial fibrillation, the more likely it is to come back after this treatment. · Cardioversion may not work as well when an uneven heartbeat is caused by another heart disease, such as heart failure. · If your symptoms bother you a lot, you may want to try cardioversion. But even if it works, you may still need to take blood thinners to prevent a stroke. · If you don't have symptoms, or if they don't bother you much, you can try medicines to slow your heart rate. And you can take blood thinners to prevent a stroke. · Cardioversion does have risks, such as stroke. Discuss the risks with your doctor. Make sure you understand them. · You may have more than one heart problem.  Cardioversion doesn't work as well if you have more than one heart problem. Why might you choose electrical cardioversion? · It restores the normal heart rhythm for most people. · The idea of having an electric shock does not bother you. · Your symptoms bother you a lot. · You have had atrial fibrillation just one time. · You do not have other heart problems. · You may not have to take as many medicines. Or you may not need to take them as long. Why might you choose rate-control medicines? · These medicines keep many people from having symptoms. · You prefer to take medicines rather than have an electric shock. · Your symptoms don't bother you much. · If these medicines don't work, you can still try electrical cardioversion. Your decision  Thinking about the facts and your feelings can help you make a decision that is right for you. Be sure you understand the benefits and risks of your options. And think about what else you need to do before you make the decision. Where can you learn more? Go to http://karoline-arash.info/. Enter Q813 in the search box to learn more about \"Deciding Between Electrical Cardioversion and Rate Control Medicines for Atrial Fibrillation. \"  Current as of: July 22, 2018  Content Version: 11.9  © 4965-3285 [employer]. Care instructions adapted under license by [employer] (which disclaims liability or warranty for this information). If you have questions about a medical condition or this instruction, always ask your healthcare professional. [place] [address] any warranty or liability for your use of this information.

## 2019-04-02 NOTE — PROGRESS NOTES
TRANSFER - IN REPORT: 
 
Verbal report received from 960 Francisco Hoang RN on [address]  being received from procedure for routine progression of care. Report consisted of patients Situation, Background, Assessment and Recommendations(SBAR). Information from the following report(s) Procedure Summary, MAR and Recent Results was reviewed with the receiving clinician. Opportunity for questions and clarification was provided. Assessment completed upon patients arrival to [address] [employer] and care assumed. Cardiac Cath Lab Recovery Arrival Note: 
 
[address] arrived to [facility] recovery area. Patient procedure= JACQUE, + clot, no CV. Patient on cardiac monitor, non-invasive blood pressure, SPO2 monitor. On  O2 @ 2 lpm via n/c. IV  of nacl on pump at 10 ml/hr. Patient status doing well without problems. Patient is A&Ox 4. Patient reports no complaints. PROCEDURE SITE CHECK: 
 
Procedure site:without any bleeding from mouth, no pain/discomfort reported at procedure site. No change in patient status. Continue to monitor patient and status. Dr Vargas Figueroa talked with pt and wife. Discount Eliquis card given to wife

## 2019-04-02 NOTE — PROGRESS NOTES
Cardiac Cath Lab Procedure Area Arrival Note: 
 
Lonnell Schwab arrived to Cardiac Cath Lab, Procedure Area. Patient identifiers verified with NAME and DATE OF BIRTH. Procedure verified with patient. Consent forms verified. Allergies verified. Patient informed of procedure and plan of care. Questions answered with review. Patient voiced understanding of procedure and plan of care. Patient on cardiac monitor, non-invasive blood pressure, SPO2 monitor. Placed on O2 @ 3 lpm via nasal cannula. IV of normal saline on pump at 25 ml/hr. Patient status doing well without problems. Patient is A&Ox 4. Patient reports no discomfort at this time. Patient medicated during procedure with orders obtained and verified by Dr. Jimmy Cunningham. Refer to patients Cardiac Cath Lab PROCEDURE REPORT for vital signs, assessment, status, and response during procedure, printed at end of case. Printed report on chart or scanned into chart.

## 2019-04-02 NOTE — PROGRESS NOTES
Discharge instructions reviewed with pt and wife Tolerated ice chips 1045 discharged via w/c with wife,instructions, and belongings Tolerated OJ withput swallowing difficulty

## 2019-04-02 NOTE — ANESTHESIA POSTPROCEDURE EVALUATION
* No procedures listed *. MAC 
 
<BSHSIANPOST> Vitals Value Taken Time BP 86/38 4/2/2019  9:17 AM  
Temp Pulse 61 4/2/2019  9:28 AM  
Resp SpO2 98 % 4/2/2019  9:28 AM  
Vitals shown include unvalidated device data.

## 2019-04-02 NOTE — PROGRESS NOTES
TRANSFER - OUT REPORT: 
 
Verbal report given to [facility] RN(name) on Reid Baumann  being transferred to (unit) for routine post - op Report consisted of patients Situation, Background, Assessment and  
Recommendations(SBAR). Information from the following report(s) SBAR, Procedure Summary, MAR and Recent Results was reviewed with the receiving nurse. Lines:  
Peripheral IV 04/02/19 Left;Upper; Outer Forearm (Active) Opportunity for questions and clarification was provided. Patient transported with: 
 Registered Nurse

## 2019-04-02 NOTE — PROGRESS NOTES
Cardiac Cath Lab Recovery Arrival Note: 
 
 
Aydee Diaz arrived to Cardiac Cath Lab, Recovery Area. Staff introduced to patient. Patient identifiers verified with NAME and DATE OF BIRTH. Procedure verified with patient. Consent forms reviewed and signed by patient or authorized representative and verified. Allergies verified. Patient and family oriented to department. Patient and family informed of procedure and plan of care. Questions answered with review. Patient prepped for procedure, per orders from physician, prior to arrival. 
 
Patient on cardiac monitor, non-invasive blood pressure, SPO2 monitor. On room air. Patient is A&Ox 4. Patient reports no complaints. Patient in stretcher, in low position, with side rails up, call bell within reach, patient instructed to call if assistance as needed. Patient prep in: [address], [place] [address]. Patient family has pager # [phone] Family in: wife in hospital [phone]. Prep by: Natasha Mills RN Pre JACQUE/CV teaching completed 12 Dr Sofie Keenan in to see pt. 805 anesthesia in to see pt.

## 2019-04-03 LAB
ECHO TV REGURGITANT MAX VELOCITY: 132.32 CM/S
ECHO TV REGURGITANT PEAK GRADIENT: 7 MMHG

## 2019-05-01 ENCOUNTER — ANESTHESIA EVENT (OUTPATIENT)
Dept: CARDIAC CATH/INVASIVE PROCEDURES | Age: 69
End: 2019-05-01
Payer: MEDICARE

## 2019-05-01 ENCOUNTER — ANESTHESIA (OUTPATIENT)
Dept: CARDIAC CATH/INVASIVE PROCEDURES | Age: 69
End: 2019-05-01
Payer: MEDICARE

## 2019-05-01 ENCOUNTER — HOSPITAL ENCOUNTER (OUTPATIENT)
Dept: CARDIAC CATH/INVASIVE PROCEDURES | Age: 69
Discharge: HOME | End: 2019-05-01
Attending: INTERNAL MEDICINE | Admitting: INTERNAL MEDICINE
Payer: MEDICARE

## 2019-05-01 VITALS
TEMPERATURE: 98.1 F | SYSTOLIC BLOOD PRESSURE: 95 MMHG | DIASTOLIC BLOOD PRESSURE: 56 MMHG | RESPIRATION RATE: 19 BRPM | WEIGHT: 212 LBS | HEIGHT: 74 IN | OXYGEN SATURATION: 94 % | BODY MASS INDEX: 27.21 KG/M2 | HEART RATE: 67 BPM

## 2019-05-01 DIAGNOSIS — I51.3 THROMBUS OF LEFT ATRIAL APPENDAGE: ICD-10-CM

## 2019-05-01 DIAGNOSIS — I46.9 CARDIAC ARREST WITH VENTRICULAR FIBRILLATION (HCC): ICD-10-CM

## 2019-05-01 DIAGNOSIS — I49.01 CARDIAC ARREST WITH VENTRICULAR FIBRILLATION (HCC): ICD-10-CM

## 2019-05-01 PROCEDURE — 77030039046 HC PAD DEFIB RADIOTRNSPNT CNMD -B

## 2019-05-01 PROCEDURE — 93005 ELECTROCARDIOGRAM TRACING: CPT

## 2019-05-01 PROCEDURE — 74011250636 HC RX REV CODE- 250/636

## 2019-05-01 PROCEDURE — 77030039046 HC PAD DEFIB RADIOTRNSPNT CNMD -B: Performed by: INTERNAL MEDICINE

## 2019-05-01 PROCEDURE — 74011000250 HC RX REV CODE- 250

## 2019-05-01 PROCEDURE — 76060000031 HC ANESTHESIA FIRST 0.5 HR: Performed by: INTERNAL MEDICINE

## 2019-05-01 PROCEDURE — 74011250636 HC RX REV CODE- 250/636: Performed by: INTERNAL MEDICINE

## 2019-05-01 PROCEDURE — 93325 DOPPLER ECHO COLOR FLOW MAPG: CPT

## 2019-05-01 PROCEDURE — 92960 CARDIOVERSION ELECTRIC EXT: CPT | Performed by: INTERNAL MEDICINE

## 2019-05-01 PROCEDURE — 76060000031 HC ANESTHESIA FIRST 0.5 HR

## 2019-05-01 RX ORDER — AMIODARONE HYDROCHLORIDE 200 MG/1
200 TABLET ORAL DAILY
COMMUNITY
End: 2019-07-01 | Stop reason: SDUPTHER

## 2019-05-01 RX ORDER — ROSUVASTATIN CALCIUM 20 MG/1
20 TABLET, COATED ORAL
Status: ON HOLD | COMMUNITY
End: 2019-05-31

## 2019-05-01 RX ORDER — SODIUM CHLORIDE 9 MG/ML
25 INJECTION, SOLUTION INTRAVENOUS CONTINUOUS
Status: DISCONTINUED | OUTPATIENT
Start: 2019-05-01 | End: 2019-05-01 | Stop reason: HOSPADM

## 2019-05-01 RX ORDER — PROPOFOL 10 MG/ML
INJECTION, EMULSION INTRAVENOUS AS NEEDED
Status: DISCONTINUED | OUTPATIENT
Start: 2019-05-01 | End: 2019-05-01 | Stop reason: HOSPADM

## 2019-05-01 RX ADMIN — SODIUM CHLORIDE 25 ML/HR: 900 INJECTION, SOLUTION INTRAVENOUS at 10:00

## 2019-05-01 RX ADMIN — PROPOFOL 30 MG: 10 INJECTION, EMULSION INTRAVENOUS at 11:14

## 2019-05-01 RX ADMIN — SODIUM CHLORIDE: 900 INJECTION, SOLUTION INTRAVENOUS at 11:05

## 2019-05-01 RX ADMIN — PROPOFOL 70 MG: 10 INJECTION, EMULSION INTRAVENOUS at 11:11

## 2019-05-01 RX ADMIN — PROPOFOL 50 MG: 10 INJECTION, EMULSION INTRAVENOUS at 11:16

## 2019-05-01 RX ADMIN — PROPOFOL 20 MG: 10 INJECTION, EMULSION INTRAVENOUS at 11:18

## 2019-05-01 NOTE — ANESTHESIA POSTPROCEDURE EVALUATION
Post-Anesthesia Evaluation and Assessment Patient: Reid Baumann MRN: [id number]  SSN: [id number] YOB: 1950  Age: 76 y.o. Sex: male I have evaluated the patient and they are stable and ready for discharge from the PACU. Cardiovascular Function/Vital Signs Visit Vitals BP 91/54 Pulse (!) 55 Temp 36.7 °C (98.1 °F) Resp 16 Ht 6' 2\" (1.88 m) Wt 96.2 kg (212 lb) SpO2 94% BMI 27.22 kg/m² Patient is status post * No anesthesia type entered * anesthesia for * No procedures listed *. Nausea/Vomiting: None Postoperative hydration reviewed and adequate. Pain: 
Pain Scale 1: Numeric (0 - 10) (05/01/19 0941) Pain Intensity 1: 0 (05/01/19 0941) Managed Neurological Status: At baseline Mental Status, Level of Consciousness: Alert and  oriented to person, place, and time Pulmonary Status:  
O2 Device: CO2 nasal cannula (05/01/19 1133) Adequate oxygenation and airway patent Complications related to anesthesia: None Post-anesthesia assessment completed. No concerns Signed By: Thiago Sanchez MD   
 May 1, 2019 * No procedures listed *. MAC 
 
<BSHSIANPOST> Vitals Value Taken Time BP 91/54 5/1/2019 11:33 AM  
Temp Pulse 59 5/1/2019 11:37 AM  
Resp 16 5/1/2019 11:33 AM  
SpO2 94 % 5/1/2019 11:37 AM  
Vitals shown include unvalidated device data.

## 2019-05-01 NOTE — PROGRESS NOTES
Anesthesia name:  Doraissa Grant    Anesthesia is present for case. Refer to anesthesia log for vitals.

## 2019-05-01 NOTE — PROGRESS NOTES
TRANSFER - IN REPORT:    Verbal report received from Mony on [address]  being received from procedure room for routine progression of care. Report consisted of patients Situation, Background, Assessment and Recommendations(SBAR). Information from the following report(s) SBAR was reviewed with the receiving clinician. Opportunity for questions and clarification was provided. Assessment completed upon patients arrival to [facility] [employer] and care assumed. Cardiac Cath Lab Recovery Arrival Note:    [address] arrived to [facility] recovery area. Patient procedure= JACQUE. Patient on cardiac monitor, non-invasive blood pressure, SPO2 monitor. On room air. IV  of NS on pump at [facility] ml/hr. Patient status doing well without problems. Patient is A&Ox 3. Patient reports no CP. at procedure site. No change in patient status. Continue to monitor patient and status.

## 2019-05-01 NOTE — PROGRESS NOTES
1100:   Cardiac Cath Lab Procedure Area Arrival Note:    Donny Shaikh arrived to Cardiac Cath Lab, Procedure Area. Patient identifiers verified with NAME and DATE OF BIRTH. Procedure verified with patient. Consent forms verified. Allergies verified. Patient informed of procedure and plan of care. Questions answered with review. Patient voiced understanding of procedure and plan of care. Patient on cardiac monitor, non-invasive blood pressure, SPO2 monitor. On RA placed on  O2 @ 2 lpm via NC.  IV of NS on pump at 25 ml/hr. Patient status doing well without problems. Patient is A&Ox 4. Patient reports no pain. Patient medicated during procedure with orders obtained and verified by Dr. Haven Rosen. Refer to patients Cardiac Cath Lab PROCEDURE REPORT for vital signs, assessment, status, and response during procedure, printed at end of case. Printed report on chart or scanned into chart. 1130: Transfer to [address] [place] [address] from Procedure Area    Verbal report given to Walter Mari RN on Donny Shaikh being transferred to Cardiac Cath Lab  for routine post - op   Patient is post JACQUE procedure. Patient stable upon transfer to . Report consisted of patients Situation, Background, Assessment and   Recommendations(SBAR). Information from the following report(s) Procedure Summary and MAR was reviewed with the receiving nurse. Opportunity for questions and clarification was provided. Patient medicated during procedure with orders obtained and verified by Dr. Haven Rosen. Refer to patient PROCEDURE REPORT for vital signs, assessment, status, and response during procedure.

## 2019-05-01 NOTE — PROCEDURES
Preliminary JACQUE note    Pt was NPO and gave informed consent prior to the procedure. Sedation provided by anesthesia with propofol. Probe inserted easily and no complications noted    Findings:  Persistent clot in LA appendage    Pt tolerated well.     Plan to continue same Inderjit Martinez MD

## 2019-05-01 NOTE — ANESTHESIA PREPROCEDURE EVALUATION
Relevant Problems No relevant active problems Anesthetic History No history of anesthetic complications Review of Systems / Medical History Patient summary reviewed, nursing notes reviewed and pertinent labs reviewed Pulmonary Within defined limits Neuro/Psych Within defined limits Cardiovascular Within defined limits Dysrhythmias : atrial fibrillation CAD 
 
 
  
GI/Hepatic/Renal 
Within defined limits Endo/Other Within defined limits Other Findings Physical Exam 
 
Airway Mallampati: II 
TM Distance: > 6 cm Neck ROM: normal range of motion Mouth opening: Normal 
 
 Cardiovascular Rhythm: irregular Rate: normal 
 
 
 
 Dental 
 
Dentition: Full lower dentures and Full upper dentures Pulmonary Breath sounds clear to auscultation Abdominal 
GI exam deferred Other Findings Anesthetic Plan ASA: 3 Anesthesia type: MAC Anesthetic plan and risks discussed with: Patient

## 2019-05-01 NOTE — ROUTINE PROCESS
Cardiac Cath Lab Recovery Arrival Note:      Zay Wheeler arrived to Cardiac Cath Lab, Recovery Area. Staff introduced to patient. Patient identifiers verified with NAME and DATE OF BIRTH. Procedure verified with patient. Consent forms reviewed and signed by patient or authorized representative and verified. Allergies verified. Patient and family oriented to department. Patient and family informed of procedure and plan of care. Questions answered with review. Patient prepped for procedure, per orders from physician, prior to arrival.    Patient on cardiac monitor, non-invasive blood pressure, SPO2 monitor. On room air. Patient is A&Ox 3. Patient reports no CP. Patient in stretcher, in low position, with side rails up, call bell within reach, patient instructed to call if assistance as needed. Patient prep in: [address], [place] [address].      Prep by: DON

## 2019-05-02 LAB
ATRIAL RATE: 64 BPM
CALCULATED R AXIS, ECG10: 14 DEGREES
CALCULATED T AXIS, ECG11: 141 DEGREES
DIAGNOSIS, 93000: NORMAL
Q-T INTERVAL, ECG07: 468 MS
QRS DURATION, ECG06: 140 MS
QTC CALCULATION (BEZET), ECG08: 486 MS
VENTRICULAR RATE, ECG03: 65 BPM

## 2019-05-31 ENCOUNTER — HOSPITAL ENCOUNTER (OUTPATIENT)
Dept: CARDIAC CATH/INVASIVE PROCEDURES | Age: 69
Discharge: HOME | DRG: 222 | End: 2019-05-31
Attending: INTERNAL MEDICINE | Admitting: INTERNAL MEDICINE
Payer: MEDICARE

## 2019-05-31 ENCOUNTER — ANESTHESIA (OUTPATIENT)
Dept: CARDIAC CATH/INVASIVE PROCEDURES | Age: 69
DRG: 222 | End: 2019-05-31
Payer: MEDICARE

## 2019-05-31 ENCOUNTER — ANESTHESIA EVENT (OUTPATIENT)
Dept: CARDIAC CATH/INVASIVE PROCEDURES | Age: 69
DRG: 222 | End: 2019-05-31
Payer: MEDICARE

## 2019-05-31 ENCOUNTER — APPOINTMENT (OUTPATIENT)
Dept: GENERAL RADIOLOGY | Age: 69
DRG: 222 | End: 2019-05-31
Attending: EMERGENCY MEDICINE
Payer: MEDICARE

## 2019-05-31 ENCOUNTER — HOSPITAL ENCOUNTER (INPATIENT)
Age: 69
LOS: 26 days | Discharge: HOME HEALTH | DRG: 222 | End: 2019-06-26
Attending: EMERGENCY MEDICINE | Admitting: INTERNAL MEDICINE
Payer: MEDICARE

## 2019-05-31 ENCOUNTER — APPOINTMENT (OUTPATIENT)
Dept: CT IMAGING | Age: 69
DRG: 222 | End: 2019-05-31
Attending: EMERGENCY MEDICINE
Payer: MEDICARE

## 2019-05-31 VITALS
BODY MASS INDEX: 27.22 KG/M2 | RESPIRATION RATE: 16 BRPM | OXYGEN SATURATION: 98 % | DIASTOLIC BLOOD PRESSURE: 56 MMHG | SYSTOLIC BLOOD PRESSURE: 96 MMHG | HEIGHT: 74 IN | HEART RATE: 56 BPM

## 2019-05-31 DIAGNOSIS — I48.0 PAROXYSMAL A-FIB (HCC): ICD-10-CM

## 2019-05-31 DIAGNOSIS — J81.0 ACUTE PULMONARY EDEMA (HCC): ICD-10-CM

## 2019-05-31 DIAGNOSIS — I25.5 ISCHEMIC CARDIOMYOPATHY: ICD-10-CM

## 2019-05-31 DIAGNOSIS — R06.00 DYSPNEA, UNSPECIFIED TYPE: ICD-10-CM

## 2019-05-31 DIAGNOSIS — I50.9 CONGESTIVE HEART FAILURE, UNSPECIFIED HF CHRONICITY, UNSPECIFIED HEART FAILURE TYPE (HCC): ICD-10-CM

## 2019-05-31 DIAGNOSIS — I48.0 PAROXYSMAL ATRIAL FIBRILLATION (HCC): ICD-10-CM

## 2019-05-31 DIAGNOSIS — I50.9 ACUTE CONGESTIVE HEART FAILURE, UNSPECIFIED HEART FAILURE TYPE (HCC): Primary | ICD-10-CM

## 2019-05-31 DIAGNOSIS — I48.0 AF (PAROXYSMAL ATRIAL FIBRILLATION) (HCC): ICD-10-CM

## 2019-05-31 DIAGNOSIS — I44.7 LBBB (LEFT BUNDLE BRANCH BLOCK): ICD-10-CM

## 2019-05-31 DIAGNOSIS — I50.23 ACUTE ON CHRONIC SYSTOLIC CHF (CONGESTIVE HEART FAILURE) (HCC): ICD-10-CM

## 2019-05-31 DIAGNOSIS — Z95.0 STATUS POST BIVENTRICULAR PACEMAKER: ICD-10-CM

## 2019-05-31 DIAGNOSIS — I48.19 PERSISTENT ATRIAL FIBRILLATION (HCC): ICD-10-CM

## 2019-05-31 LAB
ALBUMIN SERPL-MCNC: 3.6 G/DL (ref 3.5–5)
ALBUMIN/GLOB SERPL: 1 {RATIO} (ref 1.1–2.2)
ALP SERPL-CCNC: 79 U/L (ref 45–117)
ALT SERPL-CCNC: 26 U/L (ref 12–78)
ANION GAP SERPL CALC-SCNC: 5 MMOL/L (ref 5–15)
AST SERPL-CCNC: 14 U/L (ref 15–37)
ATRIAL RATE: 54 BPM
BASOPHILS # BLD: 0.1 K/UL (ref 0–0.1)
BASOPHILS NFR BLD: 1 % (ref 0–1)
BILIRUB SERPL-MCNC: 0.8 MG/DL (ref 0.2–1)
BNP SERPL-MCNC: 3705 PG/ML
BUN SERPL-MCNC: 27 MG/DL (ref 6–20)
BUN/CREAT SERPL: 17 (ref 12–20)
CALCIUM SERPL-MCNC: 8.8 MG/DL (ref 8.5–10.1)
CALCULATED P AXIS, ECG09: 15 DEGREES
CALCULATED R AXIS, ECG10: -8 DEGREES
CALCULATED T AXIS, ECG11: 136 DEGREES
CHLORIDE SERPL-SCNC: 107 MMOL/L (ref 97–108)
CO2 SERPL-SCNC: 28 MMOL/L (ref 21–32)
COMMENT, HOLDF: NORMAL
CREAT SERPL-MCNC: 1.55 MG/DL (ref 0.7–1.3)
DIAGNOSIS, 93000: NORMAL
DIFFERENTIAL METHOD BLD: NORMAL
EOSINOPHIL # BLD: 0.2 K/UL (ref 0–0.4)
EOSINOPHIL NFR BLD: 3 % (ref 0–7)
ERYTHROCYTE [DISTWIDTH] IN BLOOD BY AUTOMATED COUNT: 14.4 % (ref 11.5–14.5)
GLOBULIN SER CALC-MCNC: 3.6 G/DL (ref 2–4)
GLUCOSE SERPL-MCNC: 97 MG/DL (ref 65–100)
HCT VFR BLD AUTO: 41.3 % (ref 36.6–50.3)
HGB BLD-MCNC: 12.9 G/DL (ref 12.1–17)
IMM GRANULOCYTES # BLD AUTO: 0 K/UL (ref 0–0.04)
IMM GRANULOCYTES NFR BLD AUTO: 0 % (ref 0–0.5)
LYMPHOCYTES # BLD: 1 K/UL (ref 0.8–3.5)
LYMPHOCYTES NFR BLD: 15 % (ref 12–49)
MCH RBC QN AUTO: 30.6 PG (ref 26–34)
MCHC RBC AUTO-ENTMCNC: 31.2 G/DL (ref 30–36.5)
MCV RBC AUTO: 97.9 FL (ref 80–99)
MONOCYTES # BLD: 0.6 K/UL (ref 0–1)
MONOCYTES NFR BLD: 10 % (ref 5–13)
NEUTS SEG # BLD: 4.5 K/UL (ref 1.8–8)
NEUTS SEG NFR BLD: 71 % (ref 32–75)
NRBC # BLD: 0 K/UL (ref 0–0.01)
NRBC BLD-RTO: 0 PER 100 WBC
P-R INTERVAL, ECG05: 254 MS
PLATELET # BLD AUTO: 158 K/UL (ref 150–400)
PMV BLD AUTO: 10 FL (ref 8.9–12.9)
POTASSIUM SERPL-SCNC: 4.1 MMOL/L (ref 3.5–5.1)
PROT SERPL-MCNC: 7.2 G/DL (ref 6.4–8.2)
Q-T INTERVAL, ECG07: 518 MS
QRS DURATION, ECG06: 152 MS
QTC CALCULATION (BEZET), ECG08: 491 MS
RBC # BLD AUTO: 4.22 M/UL (ref 4.1–5.7)
SAMPLES BEING HELD,HOLD: NORMAL
SODIUM SERPL-SCNC: 140 MMOL/L (ref 136–145)
VENTRICULAR RATE, ECG03: 54 BPM
WBC # BLD AUTO: 6.3 K/UL (ref 4.1–11.1)

## 2019-05-31 PROCEDURE — 83690 ASSAY OF LIPASE: CPT

## 2019-05-31 PROCEDURE — 71275 CT ANGIOGRAPHY CHEST: CPT

## 2019-05-31 PROCEDURE — 74011000258 HC RX REV CODE- 258: Performed by: RADIOLOGY

## 2019-05-31 PROCEDURE — 74011000250 HC RX REV CODE- 250: Performed by: EMERGENCY MEDICINE

## 2019-05-31 PROCEDURE — 76060000031 HC ANESTHESIA FIRST 0.5 HR: Performed by: INTERNAL MEDICINE

## 2019-05-31 PROCEDURE — 93325 DOPPLER ECHO COLOR FLOW MAPG: CPT

## 2019-05-31 PROCEDURE — 83880 ASSAY OF NATRIURETIC PEPTIDE: CPT

## 2019-05-31 PROCEDURE — 93005 ELECTROCARDIOGRAM TRACING: CPT

## 2019-05-31 PROCEDURE — 77030039046 HC PAD DEFIB RADIOTRNSPNT CNMD -B: Performed by: INTERNAL MEDICINE

## 2019-05-31 PROCEDURE — 84484 ASSAY OF TROPONIN QUANT: CPT

## 2019-05-31 PROCEDURE — 36415 COLL VENOUS BLD VENIPUNCTURE: CPT

## 2019-05-31 PROCEDURE — 85025 COMPLETE CBC W/AUTO DIFF WBC: CPT

## 2019-05-31 PROCEDURE — 71046 X-RAY EXAM CHEST 2 VIEWS: CPT

## 2019-05-31 PROCEDURE — 80053 COMPREHEN METABOLIC PANEL: CPT

## 2019-05-31 PROCEDURE — 99285 EMERGENCY DEPT VISIT HI MDM: CPT

## 2019-05-31 PROCEDURE — 65660000001 HC RM ICU INTERMED STEPDOWN

## 2019-05-31 PROCEDURE — 92960 CARDIOVERSION ELECTRIC EXT: CPT | Performed by: INTERNAL MEDICINE

## 2019-05-31 PROCEDURE — 96374 THER/PROPH/DIAG INJ IV PUSH: CPT

## 2019-05-31 PROCEDURE — 74011636320 HC RX REV CODE- 636/320: Performed by: RADIOLOGY

## 2019-05-31 PROCEDURE — 74011250636 HC RX REV CODE- 250/636

## 2019-05-31 RX ORDER — LIDOCAINE HYDROCHLORIDE 20 MG/ML
INJECTION, SOLUTION EPIDURAL; INFILTRATION; INTRACAUDAL; PERINEURAL AS NEEDED
Status: DISCONTINUED | OUTPATIENT
Start: 2019-05-31 | End: 2019-05-31 | Stop reason: HOSPADM

## 2019-05-31 RX ORDER — SODIUM CHLORIDE 9 MG/ML
INJECTION, SOLUTION INTRAVENOUS
Status: DISCONTINUED | OUTPATIENT
Start: 2019-05-31 | End: 2019-05-31 | Stop reason: HOSPADM

## 2019-05-31 RX ORDER — SODIUM CHLORIDE 9 MG/ML
500 INJECTION, SOLUTION INTRAVENOUS CONTINUOUS
Status: DISCONTINUED | OUTPATIENT
Start: 2019-05-31 | End: 2019-05-31 | Stop reason: HOSPADM

## 2019-05-31 RX ORDER — BUMETANIDE 0.25 MG/ML
2 INJECTION INTRAMUSCULAR; INTRAVENOUS
Status: COMPLETED | OUTPATIENT
Start: 2019-05-31 | End: 2019-05-31

## 2019-05-31 RX ORDER — ACETAMINOPHEN 325 MG/1
650 TABLET ORAL
Status: DISCONTINUED | OUTPATIENT
Start: 2019-05-31 | End: 2019-06-26 | Stop reason: HOSPADM

## 2019-05-31 RX ORDER — SODIUM CHLORIDE 0.9 % (FLUSH) 0.9 %
10 SYRINGE (ML) INJECTION
Status: COMPLETED | OUTPATIENT
Start: 2019-05-31 | End: 2019-05-31

## 2019-05-31 RX ORDER — BUMETANIDE 2 MG/1
2 TABLET ORAL DAILY
COMMUNITY
End: 2019-06-26

## 2019-05-31 RX ORDER — PROPOFOL 10 MG/ML
INJECTION, EMULSION INTRAVENOUS AS NEEDED
Status: DISCONTINUED | OUTPATIENT
Start: 2019-05-31 | End: 2019-05-31 | Stop reason: HOSPADM

## 2019-05-31 RX ORDER — MORPHINE SULFATE 2 MG/ML
2 INJECTION, SOLUTION INTRAMUSCULAR; INTRAVENOUS
Status: DISCONTINUED | OUTPATIENT
Start: 2019-05-31 | End: 2019-06-26 | Stop reason: HOSPADM

## 2019-05-31 RX ORDER — SODIUM CHLORIDE 0.9 % (FLUSH) 0.9 %
5-40 SYRINGE (ML) INJECTION AS NEEDED
Status: DISCONTINUED | OUTPATIENT
Start: 2019-05-31 | End: 2019-05-31 | Stop reason: HOSPADM

## 2019-05-31 RX ORDER — SODIUM CHLORIDE 0.9 % (FLUSH) 0.9 %
5-40 SYRINGE (ML) INJECTION EVERY 8 HOURS
Status: DISCONTINUED | OUTPATIENT
Start: 2019-05-31 | End: 2019-05-31 | Stop reason: HOSPADM

## 2019-05-31 RX ADMIN — LIDOCAINE HYDROCHLORIDE 40 MG: 20 INJECTION, SOLUTION EPIDURAL; INFILTRATION; INTRACAUDAL; PERINEURAL at 07:57

## 2019-05-31 RX ADMIN — IOPAMIDOL 80 ML: 755 INJECTION, SOLUTION INTRAVENOUS at 20:33

## 2019-05-31 RX ADMIN — SODIUM CHLORIDE: 9 INJECTION, SOLUTION INTRAVENOUS at 07:57

## 2019-05-31 RX ADMIN — PROPOFOL 100 MG: 10 INJECTION, EMULSION INTRAVENOUS at 07:57

## 2019-05-31 RX ADMIN — Medication 10 ML: at 20:33

## 2019-05-31 RX ADMIN — SODIUM CHLORIDE 100 ML: 900 INJECTION, SOLUTION INTRAVENOUS at 20:33

## 2019-05-31 RX ADMIN — BUMETANIDE 2 MG: 0.25 INJECTION INTRAMUSCULAR; INTRAVENOUS at 22:31

## 2019-05-31 NOTE — ROUTINE PROCESS
Cardiac Cath Lab Recovery Arrival Note:      Aydee Diaz arrived to Cardiac Cath Lab, Recovery Area. Staff introduced to patient. Patient identifiers verified with NAME and DATE OF BIRTH. Procedure verified with patient. Consent forms reviewed and signed by patient or authorized representative and verified. Allergies verified. Patient and family oriented to department. Patient and family informed of procedure and plan of care. Questions answered with review. Patient prepped for procedure, per orders from physician, prior to arrival.    Patient on cardiac monitor, non-invasive blood pressure, SPO2 monitor. On RA. Patient is A&Ox 4. Patient reports no pain. Patient in stretcher, in low position, with side rails up, call bell within reach, patient instructed to call if assistance as needed. Patient prep in: [address], [place] [address]. Patient family has pager #   Family in: . Prep by: GRACIA Tellez RN

## 2019-05-31 NOTE — ANESTHESIA POSTPROCEDURE EVALUATION
Procedure(s):  TRANSESOPHAGEAL ECHOCARDIOGRAPHY GUIDED CARDIOVERSION  CARDIOVERSION ANESTHESIA MONITORED. MAC    Anesthesia Post Evaluation        Patient location during evaluation: PACU  Patient participation: complete - patient participated  Level of consciousness: awake and alert  Pain management: adequate  Airway patency: patent  Anesthetic complications: no  Cardiovascular status: acceptable  Respiratory status: acceptable  Hydration status: acceptable  Comments: I have seen and evaluated the patient and is ready for discharge. Yeimi Brown MD    Post anesthesia nausea and vomiting:  none      Vitals Value Taken Time   BP 95/52 5/31/2019  8:33 AM   Temp     Pulse 57 5/31/2019  8:45 AM   Resp     SpO2 97 % 5/31/2019  8:45 AM   Vitals shown include unvalidated device data.

## 2019-05-31 NOTE — PROGRESS NOTES
Discussed with the patient and all questioned fully answered. He will call me if any problems arise. Patient discharged home via wheelchair with wife . Given discharge instructions.

## 2019-05-31 NOTE — Clinical Note
Sheath #1: Closed using manual compression. Site secured by Tegaderm and transparent dressing. Pressure held for: 8 minutes.

## 2019-05-31 NOTE — ED PROVIDER NOTES
76 y.o. male with past medical history significant for Paroxysmal Atrial Fibrillation who presents from home with chief complaint of SOB. Patient states he has been in A-fib for ~90 days with accompanied SOB and a \"blood clot in his heart\", and was placed on Eliquis which dissolved the clot. Patient was seen by cardiologist Dr. Hanny Cheatham this morning for persistent A-fib, and underwent ECHO JACQUE with electrical cardioversion x3 which converted him to NSR. Patient presents to [facility] ED today persisting SOB that onset ~60 days ago with palpitations. Patient reports aggravation of SOB with lying supine and improvement with sitting up, noting he is only able to sleep \"2 hours at a time\" due to SOB with lying supine. Patient reports accompanying dark urine, loose stools, decreased appetite, chills, and increased bilateral lower leg swelling. Per medical records, patient takes Bumex and Spironolactone daily which he is compliant with. Patient has history of MI in 2010 after which he had his \"sternum removed because it was infected. \" Pt denies fever, cough, congestion, chest pain or exertional chest pain, abdominal pain, nausea, vomiting, diarrhea, difficulty with urination or dysuria. There are no other acute medical concerns at this time. PCP: Geraldo Miranda MD    Note written by Kaylin Hawley, as dictated by Alan Mckeon MD 7:24 PM       The history is provided by the patient, the spouse and medical records. Past Medical History:   Diagnosis Date    Paroxysmal atrial fibrillation ([facility] [address] 4/2/2019       No past surgical history on file. No family history on file.     Social History     Socioeconomic History    Marital status:      Spouse name: Not on file    Number of children: Not on file    Years of education: Not on file    Highest education level: Not on file   Occupational History    Not on file   Social Needs    Financial resource strain: Not on file    Food insecurity: Worry: Not on file     Inability: Not on file    Transportation needs:     Medical: Not on file     Non-medical: Not on file   Tobacco Use    Smoking status: Not on file   Substance and Sexual Activity    Alcohol use: Not on file    Drug use: Not on file    Sexual activity: Not on file   Lifestyle    Physical activity:     Days per week: Not on file     Minutes per session: Not on file    Stress: Not on file   Relationships    Social connections:     Talks on phone: Not on file     Gets together: Not on file     Attends [employer] service: Not on file     Active member of club or organization: Not on file     Attends meetings of clubs or organizations: Not on file     Relationship status: Not on file    Intimate partner violence:     Fear of current or ex partner: Not on file     Emotionally abused: Not on file     Physically abused: Not on file     Forced sexual activity: Not on file   Other Topics Concern    Not on file   Social History Narrative    Not on file         ALLERGIES: Patient has no known allergies. Review of Systems   Constitutional: Positive for appetite change and chills. Negative for fever. Respiratory: Positive for shortness of breath. Negative for cough. Cardiovascular: Positive for palpitations and leg swelling. Negative for chest pain. Gastrointestinal: Negative for abdominal pain, diarrhea, nausea and vomiting. Genitourinary: Negative for difficulty urinating and dysuria. Neurological: Negative for syncope. All other systems reviewed and are negative. Vitals:    05/31/19 1859   Pulse: 76   SpO2: 97%            Physical Exam   Constitutional: He is oriented to person, place, and time. He appears well-developed and well-nourished. He does not appear ill. No distress. Uncomfortable appearing, AxOx4, speaking in complete sentences   HENT:   Head: Normocephalic and atraumatic.    Nose: Nose normal.   Mouth/Throat: Oropharynx is clear and moist. No oropharyngeal exudate. Cn intact       Eyes: Pupils are equal, round, and reactive to light. Conjunctivae and EOM are normal. Right eye exhibits no discharge. Left eye exhibits no discharge. Neck: Normal range of motion. Neck supple. Cardiovascular: Normal rate, regular rhythm, normal heart sounds and intact distal pulses. Exam reveals no gallop and no friction rub. No murmur heard. Noted sternectomy/ nttp   Pulmonary/Chest: Effort normal and breath sounds normal. No respiratory distress. He has no wheezes. He has no rales. He exhibits no tenderness. Abdominal: Soft. Bowel sounds are normal. He exhibits no distension and no mass. There is no tenderness. There is no rebound and no guarding. nttp     Genitourinary:   Genitourinary Comments: Pt denies urinary/ Testicular/ scrotal or penile  complaints   Musculoskeletal: Normal range of motion. He exhibits edema. He exhibits no tenderness or deformity. 4+  Pitting edema bilat ankles   Lymphadenopathy:     He has no cervical adenopathy. Neurological: He is alert and oriented to person, place, and time. He displays normal reflexes. No cranial nerve deficit or sensory deficit. He exhibits normal muscle tone. Coordination normal.   Skin: Skin is warm and dry. Capillary refill takes less than 2 seconds. No rash noted. No erythema. Psychiatric: He has a normal mood and affect. Nursing note and vitals reviewed. MDM  Number of Diagnoses or Management Options  Acute congestive heart failure, unspecified heart failure type [facility]):   Acute pulmonary edema ([facility] [address]):   Dyspnea, unspecified type:          Procedures    Chief Complaint   Patient presents with    Irregular Heart Beat    Shortness of Breath       10:01 PM  The patients presenting problems have been discussed, and they are in agreement with the care plan formulated and outlined with them. I have encouraged them to ask questions as they arise throughout their visit.     MEDICATIONS GIVEN:  Medications bumetanide (BUMEX) injection 2 mg (has no administration in time range)   sodium chloride 0.9 % bolus infusion 100 mL (100 mL IntraVENous New Bag 5/31/19 2033)   iopamidol (ISOVUE-370) 76 % injection 100 mL (80 mL IntraVENous Given 5/31/19 2033)   sodium chloride (NS) flush 10 mL (10 mL IntraVENous Given 5/31/19 2033)       LABS REVIEWED:  Labs Reviewed   METABOLIC PANEL, COMPREHENSIVE - Abnormal; Notable for the following components:       Result Value    BUN 27 (*)     Creatinine 1.55 (*)     GFR est AA 54 (*)     GFR est non-AA 45 (*)     AST (SGOT) 14 (*)     A-G Ratio 1.0 (*)     All other components within normal limits   NT-PRO BNP - Abnormal; Notable for the following components:    NT pro-BNP 3,705 (*)     All other components within normal limits   CBC WITH AUTOMATED DIFF   SAMPLES BEING HELD   LIPASE   TROPONIN I       RADIOLOGY RESULTS:  The following have been ordered and reviewed:  _____________________________________________________________________  _____________________________________________________________________    EKG interpretation:   Rhythm: normal sinus rhythm; and regular . Rate (approx.): 74; Axis: normal; P wave: normal; QRS interval: normal ; ST/T wave: normal; Negative acute significant segmental elevations. Compared to strudy dated 05/31/2019 at 10 am    PROCEDURES:        CONSULTATIONS:       PROGRESS NOTES:      DIAGNOSIS:    1. Acute congestive heart failure, unspecified heart failure type ([address]    2. Acute pulmonary edema (HCC)    3. Dyspnea, unspecified type        PLAN:  1- CHF/ Pulmonary Edema - will start bipap to ease WOB/ give Bumex 2 mg;       ED COURSE: The patients hospital course has been uncomplicated.       10:06 PM  Discussed with Pt/ wife / they agree w/ plans; will start bipap to ease WOB/ give Bumex;

## 2019-05-31 NOTE — Clinical Note
Sheath #1: Dressed using tape and transparent dressing. Site: clean, dry, & intact, no bleeding and no hematoma.

## 2019-05-31 NOTE — ED NOTES
1925 Pt transported to XR via stretcher. 2030 Pt transported to CT via stretcher. 2220 RT notified of BIPAP.    2300 Pt tolerating BIPAP well. No complaints at this time. Will continue to monitor.

## 2019-05-31 NOTE — PROGRESS NOTES
TRANSFER - OUT REPORT:    Verbal report given to Rodolfo Mohamud RN on [address] being transferred to cath lab recovery for routine progression of care       Report consisted of patients Situation, Background, Assessment and   Recommendations(SBAR). Information from the following report(s) Procedure Summary was reviewed with the receiving nurse. Opportunity for questions and clarification was provided. Cardiac Cath Lab Procedure Area Arrival Note:    [address] arrived to Cardiac Cath Lab, Procedure Area. Patient identifiers verified with NAME and DATE OF BIRTH. Procedure verified with patient. Consent forms verified. Allergies verified. Patient informed of procedure and plan of care. Questions answered with review. Patient voiced understanding of procedure and plan of care. Patient on cardiac monitor, non-invasive blood pressure, SPO2 monitor. On  or O2 @ 2 lpm via nasal canula. IV of 0.9% NS on pump at 25 ml/hr. Patient status doing well without problems. Patient is A&Ox 4. Patient reports no pain. Patient medicated during procedure with orders obtained and verified by Dr. Jimmy Cunningham. Refer to patients Cardiac Cath Lab PROCEDURE REPORT for vital signs, assessment, status, and response during procedure, printed at end of case. Printed report on chart or scanned into chart.

## 2019-05-31 NOTE — PROGRESS NOTES
TRANSFER - IN REPORT:    Verbal report received from [facility] on [employer]  being received from cath lab procedure area  for routine progression of care. Report consisted of patients Situation, Background, Assessment and Recommendations(SBAR). Information from the following report(s) Kardex and Procedure Summary was reviewed with the receiving clinician. Opportunity for questions and clarification was provided. Assessment completed upon patients arrival to [facility] and care assumed. Cardiac Cath Lab Recovery Arrival Note:    [employer] arrived to [facility] recovery area. Patient procedure= JACQUE and CV. Patient on cardiac monitor, non-invasive blood pressure, SPO2 monitor. On RA . IV  of NS on pump at 25 ml/hr. Patient status doing well without problems. Patient is A&Ox 3. Patient reports no pain. PROCEDURE SITE CHECK:    Procedure site:  none     No change in patient status. Continue to monitor patient and status.

## 2019-05-31 NOTE — Clinical Note
Mallampati: Class I - soft palate, uvula, fauces, pillars visible. ASA: Class 2 - patient with mild systemic disease.

## 2019-05-31 NOTE — ED TRIAGE NOTES
Triage:  Pt to ED from home via referral of Dr. Janie Clark (Cardiology) due to continued SOB and irregular HR. Pt states earlier today underwent an electrical cardioversion for his AFib.

## 2019-06-01 LAB
ALBUMIN SERPL-MCNC: 3.3 G/DL (ref 3.5–5)
ALBUMIN/GLOB SERPL: 1 {RATIO} (ref 1.1–2.2)
ALP SERPL-CCNC: 76 U/L (ref 45–117)
ALT SERPL-CCNC: 21 U/L (ref 12–78)
ANION GAP SERPL CALC-SCNC: 5 MMOL/L (ref 5–15)
AST SERPL-CCNC: 12 U/L (ref 15–37)
BASOPHILS # BLD: 0 K/UL (ref 0–0.1)
BASOPHILS NFR BLD: 1 % (ref 0–1)
BILIRUB SERPL-MCNC: 0.8 MG/DL (ref 0.2–1)
BUN SERPL-MCNC: 24 MG/DL (ref 6–20)
BUN/CREAT SERPL: 18 (ref 12–20)
CALCIUM SERPL-MCNC: 8.3 MG/DL (ref 8.5–10.1)
CHLORIDE SERPL-SCNC: 108 MMOL/L (ref 97–108)
CHOLEST SERPL-MCNC: 64 MG/DL
CO2 SERPL-SCNC: 29 MMOL/L (ref 21–32)
CREAT SERPL-MCNC: 1.36 MG/DL (ref 0.7–1.3)
DIFFERENTIAL METHOD BLD: ABNORMAL
EOSINOPHIL # BLD: 0.1 K/UL (ref 0–0.4)
EOSINOPHIL NFR BLD: 2 % (ref 0–7)
ERYTHROCYTE [DISTWIDTH] IN BLOOD BY AUTOMATED COUNT: 14.4 % (ref 11.5–14.5)
GLOBULIN SER CALC-MCNC: 3.4 G/DL (ref 2–4)
GLUCOSE SERPL-MCNC: 96 MG/DL (ref 65–100)
HCT VFR BLD AUTO: 38.9 % (ref 36.6–50.3)
HDLC SERPL-MCNC: 39 MG/DL
HDLC SERPL: 1.6 {RATIO} (ref 0–5)
HGB BLD-MCNC: 12.4 G/DL (ref 12.1–17)
IMM GRANULOCYTES # BLD AUTO: 0 K/UL (ref 0–0.04)
IMM GRANULOCYTES NFR BLD AUTO: 1 % (ref 0–0.5)
LDLC SERPL CALC-MCNC: 14 MG/DL (ref 0–100)
LIPASE SERPL-CCNC: 57 U/L (ref 73–393)
LIPID PROFILE,FLP: NORMAL
LYMPHOCYTES # BLD: 0.9 K/UL (ref 0.8–3.5)
LYMPHOCYTES NFR BLD: 17 % (ref 12–49)
MAGNESIUM SERPL-MCNC: 2.2 MG/DL (ref 1.6–2.4)
MCH RBC QN AUTO: 31 PG (ref 26–34)
MCHC RBC AUTO-ENTMCNC: 31.9 G/DL (ref 30–36.5)
MCV RBC AUTO: 97.3 FL (ref 80–99)
MONOCYTES # BLD: 0.6 K/UL (ref 0–1)
MONOCYTES NFR BLD: 11 % (ref 5–13)
NEUTS SEG # BLD: 3.8 K/UL (ref 1.8–8)
NEUTS SEG NFR BLD: 68 % (ref 32–75)
NRBC # BLD: 0 K/UL (ref 0–0.01)
NRBC BLD-RTO: 0 PER 100 WBC
PHOSPHATE SERPL-MCNC: 3.4 MG/DL (ref 2.6–4.7)
PLATELET # BLD AUTO: 143 K/UL (ref 150–400)
PMV BLD AUTO: 10 FL (ref 8.9–12.9)
POTASSIUM SERPL-SCNC: 3.9 MMOL/L (ref 3.5–5.1)
PROT SERPL-MCNC: 6.7 G/DL (ref 6.4–8.2)
RBC # BLD AUTO: 4 M/UL (ref 4.1–5.7)
SODIUM SERPL-SCNC: 142 MMOL/L (ref 136–145)
TRIGL SERPL-MCNC: 55 MG/DL (ref ?–150)
TROPONIN I SERPL-MCNC: <0.05 NG/ML
TROPONIN I SERPL-MCNC: <0.05 NG/ML
TSH SERPL DL<=0.05 MIU/L-ACNC: 2.45 UIU/ML (ref 0.36–3.74)
VLDLC SERPL CALC-MCNC: 11 MG/DL
WBC # BLD AUTO: 5.5 K/UL (ref 4.1–11.1)

## 2019-06-01 PROCEDURE — 80053 COMPREHEN METABOLIC PANEL: CPT

## 2019-06-01 PROCEDURE — 51798 US URINE CAPACITY MEASURE: CPT

## 2019-06-01 PROCEDURE — 77030034696 HC CATH URETH FOL 2W BARD -A

## 2019-06-01 PROCEDURE — 80061 LIPID PANEL: CPT

## 2019-06-01 PROCEDURE — 74011250637 HC RX REV CODE- 250/637: Performed by: HOSPITALIST

## 2019-06-01 PROCEDURE — 65660000001 HC RM ICU INTERMED STEPDOWN

## 2019-06-01 PROCEDURE — 84100 ASSAY OF PHOSPHORUS: CPT

## 2019-06-01 PROCEDURE — 36415 COLL VENOUS BLD VENIPUNCTURE: CPT

## 2019-06-01 PROCEDURE — 83735 ASSAY OF MAGNESIUM: CPT

## 2019-06-01 PROCEDURE — 94760 N-INVAS EAR/PLS OXIMETRY 1: CPT

## 2019-06-01 PROCEDURE — 84443 ASSAY THYROID STIM HORMONE: CPT

## 2019-06-01 PROCEDURE — 77030037404 HC CATH FOL COUDE SURESTEP BARD -B

## 2019-06-01 PROCEDURE — 85025 COMPLETE CBC W/AUTO DIFF WBC: CPT

## 2019-06-01 PROCEDURE — 74011000250 HC RX REV CODE- 250: Performed by: INTERNAL MEDICINE

## 2019-06-01 PROCEDURE — 74011250637 HC RX REV CODE- 250/637: Performed by: INTERNAL MEDICINE

## 2019-06-01 RX ORDER — TAMSULOSIN HYDROCHLORIDE 0.4 MG/1
0.4 CAPSULE ORAL DAILY
Status: DISCONTINUED | OUTPATIENT
Start: 2019-06-02 | End: 2019-06-01

## 2019-06-01 RX ORDER — CARVEDILOL 12.5 MG/1
12.5 TABLET ORAL 2 TIMES DAILY WITH MEALS
Status: DISCONTINUED | OUTPATIENT
Start: 2019-06-01 | End: 2019-06-19

## 2019-06-01 RX ORDER — TAMSULOSIN HYDROCHLORIDE 0.4 MG/1
0.4 CAPSULE ORAL DAILY
Status: DISCONTINUED | OUTPATIENT
Start: 2019-06-01 | End: 2019-06-04

## 2019-06-01 RX ORDER — ONDANSETRON 2 MG/ML
4 INJECTION INTRAMUSCULAR; INTRAVENOUS
Status: DISCONTINUED | OUTPATIENT
Start: 2019-06-01 | End: 2019-06-26 | Stop reason: HOSPADM

## 2019-06-01 RX ORDER — SPIRONOLACTONE 25 MG/1
25 TABLET ORAL DAILY
Status: DISCONTINUED | OUTPATIENT
Start: 2019-06-01 | End: 2019-06-05

## 2019-06-01 RX ORDER — AMIODARONE HYDROCHLORIDE 200 MG/1
200 TABLET ORAL DAILY
Status: DISCONTINUED | OUTPATIENT
Start: 2019-06-01 | End: 2019-06-07

## 2019-06-01 RX ORDER — ASPIRIN 81 MG/1
81 TABLET ORAL DAILY
Status: DISCONTINUED | OUTPATIENT
Start: 2019-06-01 | End: 2019-06-26 | Stop reason: HOSPADM

## 2019-06-01 RX ORDER — SODIUM CHLORIDE 0.9 % (FLUSH) 0.9 %
5-40 SYRINGE (ML) INJECTION EVERY 8 HOURS
Status: DISCONTINUED | OUTPATIENT
Start: 2019-06-01 | End: 2019-06-26 | Stop reason: HOSPADM

## 2019-06-01 RX ORDER — BUMETANIDE 0.25 MG/ML
1 INJECTION INTRAMUSCULAR; INTRAVENOUS 2 TIMES DAILY
Status: DISCONTINUED | OUTPATIENT
Start: 2019-06-01 | End: 2019-06-07

## 2019-06-01 RX ORDER — BISACODYL 5 MG
5 TABLET, DELAYED RELEASE (ENTERIC COATED) ORAL DAILY PRN
Status: DISCONTINUED | OUTPATIENT
Start: 2019-06-01 | End: 2019-06-26 | Stop reason: HOSPADM

## 2019-06-01 RX ORDER — SODIUM CHLORIDE 0.9 % (FLUSH) 0.9 %
5-40 SYRINGE (ML) INJECTION AS NEEDED
Status: DISCONTINUED | OUTPATIENT
Start: 2019-06-01 | End: 2019-06-26 | Stop reason: HOSPADM

## 2019-06-01 RX ADMIN — BUMETANIDE 1 MG: 0.25 INJECTION INTRAMUSCULAR; INTRAVENOUS at 08:01

## 2019-06-01 RX ADMIN — Medication 10 ML: at 06:00

## 2019-06-01 RX ADMIN — ASPIRIN 81 MG: 81 TABLET ORAL at 08:12

## 2019-06-01 RX ADMIN — SPIRONOLACTONE 25 MG: 25 TABLET, FILM COATED ORAL at 08:13

## 2019-06-01 RX ADMIN — Medication 10 ML: at 15:52

## 2019-06-01 RX ADMIN — CARVEDILOL 12.5 MG: 12.5 TABLET, FILM COATED ORAL at 08:13

## 2019-06-01 RX ADMIN — Medication 10 ML: at 08:01

## 2019-06-01 RX ADMIN — APIXABAN 5 MG: 5 TABLET, FILM COATED ORAL at 20:22

## 2019-06-01 RX ADMIN — BUMETANIDE 1 MG: 0.25 INJECTION INTRAMUSCULAR; INTRAVENOUS at 16:00

## 2019-06-01 RX ADMIN — APIXABAN 5 MG: 5 TABLET, FILM COATED ORAL at 08:13

## 2019-06-01 RX ADMIN — SACUBITRIL AND VALSARTAN 1 TABLET: 24; 26 TABLET, FILM COATED ORAL at 17:04

## 2019-06-01 RX ADMIN — CARVEDILOL 12.5 MG: 12.5 TABLET, FILM COATED ORAL at 17:04

## 2019-06-01 RX ADMIN — TAMSULOSIN HYDROCHLORIDE 0.4 MG: 0.4 CAPSULE ORAL at 17:04

## 2019-06-01 RX ADMIN — AMIODARONE HYDROCHLORIDE 200 MG: 200 TABLET ORAL at 08:13

## 2019-06-01 RX ADMIN — Medication 10 ML: at 22:00

## 2019-06-01 RX ADMIN — Medication 10 ML: at 14:11

## 2019-06-01 NOTE — PROGRESS NOTES
Hospitalist Progress Note  Shiv Munguia MD  Answering service: [phone] from in house phone        Date of Service:  2019  NAME:  Verona Cyr  :  1950  MRN:  [id number]      Admission Summary: This is a 72-year-old man with past medical history significant for chronic systolic congestive heart failure, dyslipidemia, hypertension, atrial fibrillation status post cardioversion, was in his usual state of health until the day of presentation at the emergency room when the patient developed worsening of his shortness of breath. The inpatient underwent cardioversion early this morning by his cardiologist.      Interval history / Subjective:   I am breathing better , BIPAP helped me a lot   Legs still swollen, Low BP      Assessment & Plan:     1. Acute-on-chronic systolic congestive heart failure NTHA 2-3   - recent echocardiogram EF of 21%-25%. - On entresto hold for low BP cont diuresis on bumax and BB   - CTA of the chest is negative for pulmonary embolism. - Cardiology to follow     2. Atrial fibrillation, status post cardioversion.    - on amiodarone  - . We will await further recommendation from the cardiologist.  We will check a TSH level. We will continue with Eliquis for anticoagulation. 3.  Dyslipidemia. We will resume preadmission medication. Check lipid profile. 4.  Hypertension. now hypotensive from above    5. Acute kidney injury. This is most likely as a result of the diuretic therapy.     - monitor     Code status: Full  DVT prophylaxis: Eliquis    Care Plan discussed with: Patient/Family and Nurse  Disposition: D     Hospital Problems  Date Reviewed: 2019          Codes Class Noted POA    * (Principal) Acute on chronic systolic CHF (congestive heart failure) ([facility] [address] ICD-10-CM: I50.23  ICD-9-CM: 428.23, 428.0  2019 Yes                Review of Systems:   A comprehensive review of systems was negative. Vital Signs:    Last 24hrs VS reviewed since prior progress note. Most recent are:  Visit Vitals  /68 (BP 1 Location: Left arm, BP Patient Position: At rest)   Pulse 75   Temp 98.2 °F (36.8 °C)   Resp 26   Wt 100.5 kg (221 lb 9 oz)   SpO2 98%   BMI 28.45 kg/m²         Intake/Output Summary (Last 24 hours) at 6/1/2019 0753  Last data filed at 6/1/2019 0431  Gross per 24 hour   Intake    Output 1200 ml   Net -1200 ml        Physical Examination:             Constitutional:  No acute distress, cooperative, pleasant    ENT:  Oral mucous moist, oropharynx benign. Neck supple,    Resp:  Crackles+   CV:  Regular rhythm, normal rate, no murmurs, gallops, rubs    GI:  Soft, non distended, non tender. normoactive bowel sounds, no hepatosplenomegaly     Musculoskeletal:  edema +    Neurologic:  Moves all extremities. AAOx3, CN II-XII reviewed     Psych:  Good insight, Not anxious nor agitated. Data Review:    I personally reviewed  Image and labs      Labs:     Recent Labs     06/01/19 0416 05/31/19 1911   WBC 5.5 6.3   HGB 12.4 12.9   HCT 38.9 41.3   * 158     Recent Labs     06/01/19 0416 05/31/19 1911    140   K 3.9 4.1    107   CO2 29 28   BUN 24* 27*   CREA 1.36* 1.55*   GLU 96 97   CA 8.3* 8.8   MG 2.2  --    PHOS 3.4  --      Recent Labs     06/01/19 0416 05/31/19 1911   SGOT 12* 14*   ALT 21 26   AP 76 79   TBILI 0.8 0.8   TP 6.7 7.2   ALB 3.3* 3.6   GLOB 3.4 3.6   LPSE  --  57*     No results for input(s): INR, PTP, APTT in the last 72 hours. No lab exists for component: INREXT   No results for input(s): FE, TIBC, PSAT, FERR in the last 72 hours. No results found for: FOL, RBCF   No results for input(s): PH, PCO2, PO2 in the last 72 hours.   Recent Labs     06/01/19 0416 05/31/19 1911   TROIQ <0.05 <0.05     Lab Results   Component Value Date/Time    Cholesterol, total 64 06/01/2019 04:16 AM    HDL Cholesterol 39 06/01/2019 04:16 AM    LDL, calculated 14 06/01/2019 04:16 AM    Triglyceride 55 06/01/2019 04:16 AM    CHOL/HDL Ratio 1.6 06/01/2019 04:16 AM     No results found for: GLUCPOC  No results found for: COLOR, APPRN, SPGRU, REFSG, NAN, PROTU, GLUCU, KETU, BILU, UROU, RAMONE, LEUKU, GLUKE, EPSU, BACTU, WBCU, RBCU, CASTS, UCRY      Medications Reviewed:     Current Facility-Administered Medications   Medication Dose Route Frequency    amiodarone (CORDARONE) tablet 200 mg  200 mg Oral DAILY    apixaban (ELIQUIS) tablet 5 mg  5 mg Oral Q12H    aspirin delayed-release tablet 81 mg  81 mg Oral DAILY    bumetanide (BUMEX) injection 1 mg  1 mg IntraVENous BID    carvedilol (COREG) tablet 12.5 mg  12.5 mg Oral BID WITH MEALS    . PHARMACY TO SUBSTITUTE PER PROTOCOL (Reordered from: evolocumab (REPATHA SURECLICK) pen injection)    Per Protocol    sacubitril-valsartan (ENTRESTO) 24-26 mg tablet 1 Tab  1 Tab Oral BID    spironolactone (ALDACTONE) tablet 25 mg  25 mg Oral DAILY    sodium chloride (NS) flush 5-40 mL  5-40 mL IntraVENous Q8H    sodium chloride (NS) flush 5-40 mL  5-40 mL IntraVENous PRN    ondansetron (ZOFRAN) injection 4 mg  4 mg IntraVENous Q4H PRN    bisacodyl (DULCOLAX) tablet 5 mg  5 mg Oral DAILY PRN    acetaminophen (TYLENOL) tablet 650 mg  650 mg Oral Q4H PRN    morphine injection 2 mg  2 mg IntraVENous Q4H PRN     ______________________________________________________________________  EXPECTED LENGTH OF STAY: - - -  ACTUAL LENGTH OF STAY:          1                 Raghu Aparicio MD

## 2019-06-01 NOTE — CONSULTS
[facility] CARDIAC ELECTROPHYSIOLOGY CONSULT              Date of  Admission: 5/31/2019  7:03 PM     Assessment and Plan:   Meg Vera is admitted with sob. # Acute on chornic systolic HF - likely exacerbated post DCCV, can happen given underlying LV dysfunciton  - agree with diuretics  - hold entresto for low bp  - cont coreg    # AF - in SR now, cont eliquis and coreg and amiodarone. - Consider ablation in near future. Recent [facility] and john subanalysis have shown improved HF outcomes with maintance of SR with ablation in this population. # LBBB - given lv dysfunciton would consider CRTD in follow up. REASON FOR CONSULT: SOB, AF, CMY  Primary Cardiologist: Nisreen  HPI: 76 yom with history of CABG in 2010, AF admitted to hospital with sob. He underwent DCCV yesterday and felt sob after. He has history of CAD in 2010, 2018 noted to have servere LV dysfunction with eF 25%. He also had AF, plan was for DCCV but this had to be postponed due to ANDREA clot. Eventually resolved and he underwent dccv yesterday. Sandy sob. Has had progressive shortness of breath recently. Denies any other complaints. Patient Active Problem List    Diagnosis Date Noted    Acute on chronic systolic CHF (congestive heart failure) ([address] 05/31/2019    Paroxysmal atrial fibrillation ([address] 04/02/2019      Nhi Toussaint MD  Past Medical History:   Diagnosis Date    Degenerative disc disease, lumbar     Heart failure ([address]     High cholesterol     Hypertension     Paroxysmal atrial fibrillation ([address] 4/2/2019    Spinal stenosis       Past Surgical History:   Procedure Laterality Date    HX APPENDECTOMY      HX CORONARY ARTERY BYPASS GRAFT      triple    HX HERNIA REPAIR      HX IMPLANTABLE CARDIOVERTER DEFIBRILLATOR       No Known Allergies   History reviewed. No pertinent family history.    Social History     Socioeconomic History    Marital status:      Spouse name: Not on file    Number of children: Not on file    Years of education: Not on file    Highest education level: Not on file   Occupational History    Not on file   Social Needs    Financial resource strain: Not on file    Food insecurity:     Worry: Not on file     Inability: Not on file    Transportation needs:     Medical: Not on file     Non-medical: Not on file   Tobacco Use    Smoking status: Former Smoker     Last attempt to quit: 2010     Years since quittin.5    Smokeless tobacco: Never Used   Substance and Sexual Activity    Alcohol use: Yes     Comment: rarely    Drug use: Never    Sexual activity: Not on file   Lifestyle    Physical activity:     Days per week: Not on file     Minutes per session: Not on file    Stress: Not on file   Relationships    Social connections:     Talks on phone: Not on file     Gets together: Not on file     Attends Orthodox service: Not on file     Active member of club or organization: Not on file     Attends meetings of clubs or organizations: Not on file     Relationship status: Not on file    Intimate partner violence:     Fear of current or ex partner: Not on file     Emotionally abused: Not on file     Physically abused: Not on file     Forced sexual activity: Not on file   Other Topics Concern    Not on file   Social History Narrative    Not on file     Current Facility-Administered Medications   Medication Dose Route Frequency    amiodarone (CORDARONE) tablet 200 mg  200 mg Oral DAILY    apixaban (ELIQUIS) tablet 5 mg  5 mg Oral Q12H    aspirin delayed-release tablet 81 mg  81 mg Oral DAILY    bumetanide (BUMEX) injection 1 mg  1 mg IntraVENous BID    carvedilol (COREG) tablet 12.5 mg  12.5 mg Oral BID WITH MEALS    . PHARMACY TO SUBSTITUTE PER PROTOCOL (Reordered from: evolocumab (REPATHA SURECLICK) pen injection)    Per Protocol    sacubitril-valsartan (ENTRESTO) 24-26 mg tablet 1 Tab  1 Tab Oral BID    spironolactone (ALDACTONE) tablet 25 mg  25 mg Oral DAILY  sodium chloride (NS) flush 5-40 mL  5-40 mL IntraVENous Q8H    sodium chloride (NS) flush 5-40 mL  5-40 mL IntraVENous PRN    ondansetron (ZOFRAN) injection 4 mg  4 mg IntraVENous Q4H PRN    bisacodyl (DULCOLAX) tablet 5 mg  5 mg Oral DAILY PRN    acetaminophen (TYLENOL) tablet 650 mg  650 mg Oral Q4H PRN    morphine injection 2 mg  2 mg IntraVENous Q4H PRN           Review of Symptoms:  A comprehensive review of systems was negative in 11 points other than stated above in HPI. Physical Exam    Visit Vitals  BP 91/52 Comment: received order to hold entresto per Dr. Corbin Young   Pulse 82   Temp 98.6 °F (37 °C)   Resp 22   Wt 100.5 kg (221 lb 9 oz)   SpO2 98%   BMI 28.45 kg/m²     Skin warm and dry  HEENT: WNL  Oropharynx without exudate. Neck supple  Lungs wheezing  Heart - RRR, Normal S1/ S2   No Mummurs, click or Rubs  No S3 or S4  Abdomen soft and non tender,   Pulses 2+ throughout   Neuro:  Normal facial grimace,  Moves all extremities.    AAAO   Psych: unanxious    Labs:   Recent Results (from the past 24 hour(s))   EKG, 12 LEAD, INITIAL    Collection Time: 05/31/19  7:05 PM   Result Value Ref Range    Ventricular Rate 75 BPM    Atrial Rate 75 BPM    P-R Interval 240 ms    QRS Duration 154 ms    Q-T Interval 440 ms    QTC Calculation (Bezet) 491 ms    Calculated P Axis -3 degrees    Calculated R Axis 105 degrees    Calculated T Axis -80 degrees    Diagnosis       Sinus rhythm with 1st degree AV block with premature supraventricular   complexes  Left bundle branch block  When compared with ECG of 31-MAY-2019 08:19,  premature supraventricular complexes are now present  QRS axis shifted right  T wave inversion now evident in Inferior leads     METABOLIC PANEL, COMPREHENSIVE    Collection Time: 05/31/19  7:11 PM   Result Value Ref Range    Sodium 140 136 - 145 mmol/L    Potassium 4.1 3.5 - 5.1 mmol/L    Chloride 107 97 - 108 mmol/L    CO2 28 21 - 32 mmol/L    Anion gap 5 5 - 15 mmol/L    Glucose 97 65 - 100 mg/dL    BUN 27 (H) 6 - 20 MG/DL    Creatinine 1.55 (H) 0.70 - 1.30 MG/DL    BUN/Creatinine ratio 17 12 - 20      GFR est AA 54 (L) >60 ml/min/1.73m2    GFR est non-AA 45 (L) >60 ml/min/1.73m2    Calcium 8.8 8.5 - 10.1 MG/DL    Bilirubin, total 0.8 0.2 - 1.0 MG/DL    ALT (SGPT) 26 12 - 78 U/L    AST (SGOT) 14 (L) 15 - 37 U/L    Alk. phosphatase 79 45 - 117 U/L    Protein, total 7.2 6.4 - 8.2 g/dL    Albumin 3.6 3.5 - 5.0 g/dL    Globulin 3.6 2.0 - 4.0 g/dL    A-G Ratio 1.0 (L) 1.1 - 2.2     CBC WITH AUTOMATED DIFF    Collection Time: 05/31/19  7:11 PM   Result Value Ref Range    WBC 6.3 4.1 - 11.1 K/uL    RBC 4.22 4.10 - 5.70 M/uL    HGB 12.9 12.1 - 17.0 g/dL    HCT 41.3 36.6 - 50.3 %    MCV 97.9 80.0 - 99.0 FL    MCH 30.6 26.0 - 34.0 PG    MCHC 31.2 30.0 - 36.5 g/dL    RDW 14.4 11.5 - 14.5 %    PLATELET 134 056 - 272 K/uL    MPV 10.0 8.9 - 12.9 FL    NRBC 0.0 0  WBC    ABSOLUTE NRBC 0.00 0.00 - 0.01 K/uL    NEUTROPHILS 71 32 - 75 %    LYMPHOCYTES 15 12 - 49 %    MONOCYTES 10 5 - 13 %    EOSINOPHILS 3 0 - 7 %    BASOPHILS 1 0 - 1 %    IMMATURE GRANULOCYTES 0 0.0 - 0.5 %    ABS. NEUTROPHILS 4.5 1.8 - 8.0 K/UL    ABS. LYMPHOCYTES 1.0 0.8 - 3.5 K/UL    ABS. MONOCYTES 0.6 0.0 - 1.0 K/UL    ABS. EOSINOPHILS 0.2 0.0 - 0.4 K/UL    ABS. BASOPHILS 0.1 0.0 - 0.1 K/UL    ABS. IMM. GRANS. 0.0 0.00 - 0.04 K/UL    DF AUTOMATED     SAMPLES BEING HELD    Collection Time: 05/31/19  7:11 PM   Result Value Ref Range    SAMPLES BEING HELD 1BLUE,1RED     COMMENT        Add-on orders for these samples will be processed based on acceptable specimen integrity and analyte stability, which may vary by analyte.    NT-PRO BNP    Collection Time: 05/31/19  7:11 PM   Result Value Ref Range    NT pro-BNP 3,705 (H) <125 PG/ML   LIPASE    Collection Time: 05/31/19  7:11 PM   Result Value Ref Range    Lipase 57 (L) 73 - 393 U/L   TROPONIN I    Collection Time: 05/31/19  7:11 PM   Result Value Ref Range    Troponin-I, Qt. <0.05 <0.05 ng/mL   METABOLIC PANEL, COMPREHENSIVE    Collection Time: 06/01/19  4:16 AM   Result Value Ref Range    Sodium 142 136 - 145 mmol/L    Potassium 3.9 3.5 - 5.1 mmol/L    Chloride 108 97 - 108 mmol/L    CO2 29 21 - 32 mmol/L    Anion gap 5 5 - 15 mmol/L    Glucose 96 65 - 100 mg/dL    BUN 24 (H) 6 - 20 MG/DL    Creatinine 1.36 (H) 0.70 - 1.30 MG/DL    BUN/Creatinine ratio 18 12 - 20      GFR est AA >60 >60 ml/min/1.73m2    GFR est non-AA 52 (L) >60 ml/min/1.73m2    Calcium 8.3 (L) 8.5 - 10.1 MG/DL    Bilirubin, total 0.8 0.2 - 1.0 MG/DL    ALT (SGPT) 21 12 - 78 U/L    AST (SGOT) 12 (L) 15 - 37 U/L    Alk. phosphatase 76 45 - 117 U/L    Protein, total 6.7 6.4 - 8.2 g/dL    Albumin 3.3 (L) 3.5 - 5.0 g/dL    Globulin 3.4 2.0 - 4.0 g/dL    A-G Ratio 1.0 (L) 1.1 - 2.2     LIPID PANEL    Collection Time: 06/01/19  4:16 AM   Result Value Ref Range    LIPID PROFILE          Cholesterol, total 64 <200 MG/DL    Triglyceride 55 <150 MG/DL    HDL Cholesterol 39 MG/DL    LDL, calculated 14 0 - 100 MG/DL    VLDL, calculated 11 MG/DL    CHOL/HDL Ratio 1.6 0.0 - 5.0     CBC WITH AUTOMATED DIFF    Collection Time: 06/01/19  4:16 AM   Result Value Ref Range    WBC 5.5 4.1 - 11.1 K/uL    RBC 4.00 (L) 4.10 - 5.70 M/uL    HGB 12.4 12.1 - 17.0 g/dL    HCT 38.9 36.6 - 50.3 %    MCV 97.3 80.0 - 99.0 FL    MCH 31.0 26.0 - 34.0 PG    MCHC 31.9 30.0 - 36.5 g/dL    RDW 14.4 11.5 - 14.5 %    PLATELET 531 (L) 005 - 400 K/uL    MPV 10.0 8.9 - 12.9 FL    NRBC 0.0 0  WBC    ABSOLUTE NRBC 0.00 0.00 - 0.01 K/uL    NEUTROPHILS 68 32 - 75 %    LYMPHOCYTES 17 12 - 49 %    MONOCYTES 11 5 - 13 %    EOSINOPHILS 2 0 - 7 %    BASOPHILS 1 0 - 1 %    IMMATURE GRANULOCYTES 1 (H) 0.0 - 0.5 %    ABS. NEUTROPHILS 3.8 1.8 - 8.0 K/UL    ABS. LYMPHOCYTES 0.9 0.8 - 3.5 K/UL    ABS. MONOCYTES 0.6 0.0 - 1.0 K/UL    ABS. EOSINOPHILS 0.1 0.0 - 0.4 K/UL    ABS. BASOPHILS 0.0 0.0 - 0.1 K/UL    ABS. IMM.  GRANS. 0.0 0.00 - 0.04 K/UL    DF AUTOMATED     TROPONIN I Collection Time: 06/01/19  4:16 AM   Result Value Ref Range    Troponin-I, Qt. <0.05 <0.05 ng/mL   TSH 3RD GENERATION    Collection Time: 06/01/19  4:16 AM   Result Value Ref Range    TSH 2.45 0.36 - 3.74 uIU/mL   PHOSPHORUS    Collection Time: 06/01/19  4:16 AM   Result Value Ref Range    Phosphorus 3.4 2.6 - 4.7 MG/DL   MAGNESIUM    Collection Time: 06/01/19  4:16 AM   Result Value Ref Range    Magnesium 2.2 1.6 - 2.4 mg/dL       Cardiographics    Telemetry: SR  ECG: SR, lbbb qrs 154  Echocardiogram: EF 25%

## 2019-06-01 NOTE — ROUTINE PROCESS
TRANSFER - OUT REPORT:    Verbal report given to [facility] RN(name) on Priyanka Arora  being transferred to (unit) for routine progression of care       Report consisted of patients Situation, Background, Assessment and   Recommendations(SBAR). Information from the following report(s) SBAR, ED Summary, Procedure Summary, MAR and Recent Results was reviewed with the receiving nurse. Lines:   Peripheral IV 05/31/19 Left Antecubital (Active)   Site Assessment Clean, dry, & intact 5/31/2019  7:12 PM   Phlebitis Assessment 0 5/31/2019  7:12 PM   Infiltration Assessment 0 5/31/2019  7:12 PM   Dressing Type Transparent 5/31/2019  7:12 PM   Hub Color/Line Status Pink;Patent; Flushed 5/31/2019  7:12 PM   Action Taken Blood drawn 5/31/2019  7:12 PM        Opportunity for questions and clarification was provided.       Patient transported with:   Monitor  O2 @ BIPAP liters

## 2019-06-01 NOTE — ROUTINE PROCESS
Bedside and Verbal shift change report given to Geovanna Greenberg RN (oncoming nurse) by Evon Cano RN (offgoing nurse). Report included the following information SBAR, Kardex, Intake/Output, MAR, Recent Results and Cardiac Rhythm NSR.

## 2019-06-01 NOTE — H&P
[address]  HISTORY AND PHYSICAL    Name:  Paul Turner  MR#:  [id number]  :  1950  ACCOUNT #:  [de-identified]  ADMIT DATE:  2019    PRIMARY CARE PHYSICIAN:  Christine Dockery MD    SOURCE OF INFORMATION:  The patient and the patient's spouse who was present at the bedside. CHIEF COMPLAINT:  Shortness of breath. HISTORY OF PRESENT ILLNESS:  This is a 75-year-old man with past medical history significant for chronic systolic congestive heart failure, dyslipidemia, hypertension, atrial fibrillation status post cardioversion, was in his usual state of health until the day of presentation at the emergency room when the patient developed worsening of his shortness of breath. The inpatient underwent cardioversion early this morning by his cardiologist.  When the patient got home, his shortness of breath got worse. He was advised to go to the emergency room for further evaluation by his cardiologist.  When the patient arrived at the emergency room, he was in significant respiratory distress and was placed on BiPAP. CTA of the chest was obtained. CTA was negative for pulmonary embolism but shows evidence of congestive heart failure. The patient was treated with Bumex and was then referred to the hospitalist service for evaluation for admission. The shortness of breath is not associated with fever, no rigors, no chills. No record of prior admission to this hospital.    PAST MEDICAL HISTORY:  1. Chronic congestive heart failure. 2.  Atrial fibrillation. 3.  Dyslipidemia. 4.  Hypertension. PAST SURGICAL HISTORY:  Not significant. ALLERGIES:  NO KNOWN DRUG ALLERGIES. MEDICATIONS:  1. Amiodarone 200 mg daily. 2.  Eliquis 5 mg twice daily. 3.  Aspirin 81 mg daily. 4.  Bumex 2 mg daily. 5.  Coreg 12.5 mg three times daily. 6.  Repatha 140 mg subcutaneously every 14 days. 7.  Entresto 1 tablet twice daily. 8.  Aldactone 25 mg daily.     SOCIAL HISTORY:  No history of alcohol or tobacco abuse. FAMILY HISTORY:  This was reviewed. His father had hypertension. REVIEW OF SYSTEMS:  HEAD, EYES, EARS, NOSE, AND THROAT:  No headache, no dizziness, no blurring of vision, no photophobia. RESPIRATORY SYSTEM:  This is positive for shortness of breath, no cough, no hemoptysis. CARDIOVASCULAR SYSTEM:  This is positive for palpitation, orthopnea, no chest pain. GASTROINTESTINAL SYSTEM:  No nausea or vomiting. No diarrhea. No constipation. GENITOURINARY SYSTEM:  No dysuria, no urgency, and no frequency. All other systems are reviewed and they are negative. PHYSICAL EXAMINATION:  GENERAL APPEARANCE:  The patient appeared ill in moderate distress. VITAL SIGNS:  On arrival at the emergency room, temperature 98.4, pulse of 76, respiratory rate 18, blood pressure 121/88, oxygen saturation 97% on room air. HEENT:  Head:  Normocephalic, atraumatic. Eyes:  Normal eye movements. No redness, no drainage, no discharge. Ears:  Normal external ears with no evidence of drainage. Nose:  No deformity and no drainage. Mouth and Throat:  No visible oral lesion. NECK:  Neck is supple. Mild JVD. No thyromegaly. CHEST:  Bilateral basilar crackles and few expiratory wheezing. HEART:  Normal S1 and S2, regular. No clinically appreciable murmur. ABDOMEN:  Soft and nontender. Normal bowel sounds. CNS:  Alert, oriented x3. No gross focal neurological deficit. EXTREMITIES:  Edema 2+. Pulses 2+ bilaterally. MUSCULOSKELETAL SYSTEM:  No evidence of joint deformity and swelling. SKIN:  No active skin lesions seen in the exposed part of the body. PSYCHIATRY:  Normal mood and affect. LYMPHATIC SYSTEM:  No cervical lymphadenopathy. DIAGNOSTIC DATA:  EKG shows sinus rhythm and nonspecific ST and T-wave abnormalities. Chest x-ray shows mild cardiomegaly with mild pulmonary interstitial edema.   CTA chest without contrast shows no evidence of pulmonary embolus, interstitial edema, small bilateral pleural effusions, mild mediastinal adenopathy. LABORATORY DATA:  Hematology:  WBC 6.3, hemoglobin at 12.9, hematocrit 41.3, platelets 832. Pro-BNP 3705. Chemistry:  Sodium 140, potassium 4.1, chloride 107, CO2 28, glucose 97, BUN 27, creatinine 1.55, calcium 8.8, bilirubin total 0.8, ALT 26, AST 14, alkaline phosphatase 79, total protein at 7.2, albumin level 3.6, globulin at 3.6. Cardiac profile:  Troponin less than 0.05. Lipase level 57. ASSESSMENT:  1. Acute-on-chronic systolic congestive heart failure. 2.  Atrial fibrillation status post cardioversion. 3.  Dyslipidemia. 4.  Hypertension. 5.  Acute kidney injury. PLAN:  1. Acute-on-chronic systolic congestive heart failure. We will admit the patient for further evaluation and treatment. The most recent echocardiogram in the system shows ejection fraction of 21%-25%. We will resume a preadmission medication. We will trend troponin level to rule out acute myocardial infarction as a possible cause of acute-on-chronic systolic congestive heart failure. CTA of the chest is negative for pulmonary embolism. We will await further recommendation from the patient's cardiologist consulted by the emergency room physician. 2.  Atrial fibrillation, status post cardioversion. Continue with preadmission medication. We will await further recommendation from the cardiologist.  We will check a TSH level. We will continue with Eliquis for anticoagulation. 3.  Dyslipidemia. We will resume preadmission medication. Check lipid profile. 4.  Hypertension. We will resume home medication and monitor the patient's blood pressure closely. 5.  Acute kidney injury. This is most likely as a result of the diuretic therapy. We will continue to monitor the patient's renal function. If there is worsening renal function, the patient may require further evaluation including a renal ultrasound and Nephrology consult.   6.  Other issues:  Code status is a full code. The patient is already on Eliquis. Because of that, there is no need for deep venous thrombosis prophylaxis. FUNCTIONAL STATUS PRIOR TO ADMISSION:  The patient is ambulatory without a device or assistance.       Juan Luis Noel MD      [id number]  D:  06/01/2019 2:50  T:  06/01/2019 3:01  JOB #:  [id number]  CC:  Betzaida Barkley MD

## 2019-06-01 NOTE — ROUTINE PROCESS
Spoke with Dr. Roddy Jean via telephone in regards to the patient's low BP.  Received order to hold entresto for now until dr. Roddy Jean sees the patient

## 2019-06-01 NOTE — PROGRESS NOTES
Bedside and Verbal shift change report given to [address], RN (oncoming nurse) by Jacque Whitaker (offgoing nurse). Report included the following information SBAR, Kardex, ED Summary, Procedure Summary, Intake/Output, MAR, Recent Results and Cardiac Rhythm NSR. Hourly rounding performed.

## 2019-06-02 LAB
ANION GAP SERPL CALC-SCNC: 7 MMOL/L (ref 5–15)
ATRIAL RATE: 75 BPM
BASOPHILS # BLD: 0.1 K/UL (ref 0–0.1)
BASOPHILS NFR BLD: 1 % (ref 0–1)
BUN SERPL-MCNC: 19 MG/DL (ref 6–20)
BUN/CREAT SERPL: 15 (ref 12–20)
CALCIUM SERPL-MCNC: 8.6 MG/DL (ref 8.5–10.1)
CALCULATED P AXIS, ECG09: -3 DEGREES
CALCULATED R AXIS, ECG10: 105 DEGREES
CALCULATED T AXIS, ECG11: -80 DEGREES
CHLORIDE SERPL-SCNC: 102 MMOL/L (ref 97–108)
CO2 SERPL-SCNC: 27 MMOL/L (ref 21–32)
CREAT SERPL-MCNC: 1.23 MG/DL (ref 0.7–1.3)
DIAGNOSIS, 93000: NORMAL
DIFFERENTIAL METHOD BLD: ABNORMAL
EOSINOPHIL # BLD: 0.1 K/UL (ref 0–0.4)
EOSINOPHIL NFR BLD: 2 % (ref 0–7)
ERYTHROCYTE [DISTWIDTH] IN BLOOD BY AUTOMATED COUNT: 14.2 % (ref 11.5–14.5)
GLUCOSE SERPL-MCNC: 102 MG/DL (ref 65–100)
HCT VFR BLD AUTO: 39.4 % (ref 36.6–50.3)
HGB BLD-MCNC: 12.3 G/DL (ref 12.1–17)
IMM GRANULOCYTES # BLD AUTO: 0 K/UL (ref 0–0.04)
IMM GRANULOCYTES NFR BLD AUTO: 0 % (ref 0–0.5)
LYMPHOCYTES # BLD: 0.8 K/UL (ref 0.8–3.5)
LYMPHOCYTES NFR BLD: 14 % (ref 12–49)
MCH RBC QN AUTO: 30.5 PG (ref 26–34)
MCHC RBC AUTO-ENTMCNC: 31.2 G/DL (ref 30–36.5)
MCV RBC AUTO: 97.8 FL (ref 80–99)
MONOCYTES # BLD: 0.6 K/UL (ref 0–1)
MONOCYTES NFR BLD: 12 % (ref 5–13)
NEUTS SEG # BLD: 3.8 K/UL (ref 1.8–8)
NEUTS SEG NFR BLD: 71 % (ref 32–75)
NRBC # BLD: 0 K/UL (ref 0–0.01)
NRBC BLD-RTO: 0 PER 100 WBC
P-R INTERVAL, ECG05: 240 MS
PLATELET # BLD AUTO: 145 K/UL (ref 150–400)
PMV BLD AUTO: 9.5 FL (ref 8.9–12.9)
POTASSIUM SERPL-SCNC: 3.9 MMOL/L (ref 3.5–5.1)
Q-T INTERVAL, ECG07: 440 MS
QRS DURATION, ECG06: 154 MS
QTC CALCULATION (BEZET), ECG08: 491 MS
RBC # BLD AUTO: 4.03 M/UL (ref 4.1–5.7)
RBC MORPH BLD: ABNORMAL
SODIUM SERPL-SCNC: 136 MMOL/L (ref 136–145)
VENTRICULAR RATE, ECG03: 75 BPM
WBC # BLD AUTO: 5.4 K/UL (ref 4.1–11.1)

## 2019-06-02 PROCEDURE — 94760 N-INVAS EAR/PLS OXIMETRY 1: CPT

## 2019-06-02 PROCEDURE — 80048 BASIC METABOLIC PNL TOTAL CA: CPT

## 2019-06-02 PROCEDURE — 36415 COLL VENOUS BLD VENIPUNCTURE: CPT

## 2019-06-02 PROCEDURE — 51798 US URINE CAPACITY MEASURE: CPT

## 2019-06-02 PROCEDURE — 74011250637 HC RX REV CODE- 250/637: Performed by: INTERNAL MEDICINE

## 2019-06-02 PROCEDURE — 74011000250 HC RX REV CODE- 250: Performed by: INTERNAL MEDICINE

## 2019-06-02 PROCEDURE — 65660000000 HC RM CCU STEPDOWN

## 2019-06-02 PROCEDURE — 85025 COMPLETE CBC W/AUTO DIFF WBC: CPT

## 2019-06-02 PROCEDURE — 74011250637 HC RX REV CODE- 250/637: Performed by: HOSPITALIST

## 2019-06-02 RX ADMIN — Medication 10 ML: at 06:00

## 2019-06-02 RX ADMIN — APIXABAN 5 MG: 5 TABLET, FILM COATED ORAL at 08:18

## 2019-06-02 RX ADMIN — BUMETANIDE 1 MG: 0.25 INJECTION INTRAMUSCULAR; INTRAVENOUS at 16:51

## 2019-06-02 RX ADMIN — CARVEDILOL 12.5 MG: 12.5 TABLET, FILM COATED ORAL at 08:18

## 2019-06-02 RX ADMIN — SPIRONOLACTONE 25 MG: 25 TABLET, FILM COATED ORAL at 08:18

## 2019-06-02 RX ADMIN — APIXABAN 5 MG: 5 TABLET, FILM COATED ORAL at 21:38

## 2019-06-02 RX ADMIN — SACUBITRIL AND VALSARTAN 1 TABLET: 24; 26 TABLET, FILM COATED ORAL at 19:02

## 2019-06-02 RX ADMIN — BUMETANIDE 1 MG: 0.25 INJECTION INTRAMUSCULAR; INTRAVENOUS at 08:18

## 2019-06-02 RX ADMIN — TAMSULOSIN HYDROCHLORIDE 0.4 MG: 0.4 CAPSULE ORAL at 08:18

## 2019-06-02 RX ADMIN — Medication 10 ML: at 21:38

## 2019-06-02 RX ADMIN — Medication 10 ML: at 13:18

## 2019-06-02 RX ADMIN — SACUBITRIL AND VALSARTAN 1 TABLET: 24; 26 TABLET, FILM COATED ORAL at 10:36

## 2019-06-02 RX ADMIN — CARVEDILOL 12.5 MG: 12.5 TABLET, FILM COATED ORAL at 19:04

## 2019-06-02 RX ADMIN — AMIODARONE HYDROCHLORIDE 200 MG: 200 TABLET ORAL at 08:18

## 2019-06-02 RX ADMIN — ASPIRIN 81 MG: 81 TABLET ORAL at 08:18

## 2019-06-02 NOTE — PROGRESS NOTES
Hospitalist Progress Note  Sera Marie MD  Answering service: [phone] OR [phone] from in house phone        Date of Service:  2019  NAME:  Corby David  :  1950  MRN:  [id number]      Admission Summary: This is a 40-year-old man with past medical history significant for chronic systolic congestive heart failure, dyslipidemia, hypertension, atrial fibrillation status post cardioversion, was in his usual state of health until the day of presentation at the emergency room when the patient developed worsening of his shortness of breath. The inpatient underwent cardioversion early this morning by his cardiologist.      Interval history / Subjective:     Legs still swollen, BP stable off BIPAP      Assessment & Plan:     1. Acute-on-chronic systolic congestive heart failure NYHA 2-3   - recent echocardiogram EF of 21%-25%. ( PENDING HERE)  - On entresto hold for low BP cont diuresis on bumax and BB Neg 3 L  - CTA of the chest is negative for pulmonary embolism. - Cardiology following     2. Atrial fibrillation, status post cardioversion.    - on amiodarone  - Await further recommendation from the cardiologist.  We will check a TSH level. - Continue with Eliquis for anticoagulation. 3.  Dyslipidemia. We will resume preadmission medication. Check lipid profile. 4.  Hypertension. On entresto     5. Acute kidney injury. This is most likely as a result of the diuretic therapy.     - monitor repeat labs no labs today     Code status: Full  DVT prophylaxis: Eliquis    Care Plan discussed with: Patient/Family and Nurse  Disposition: TBD     Hospital Problems  Date Reviewed: 2019          Codes Class Noted POA    * (Principal) Acute on chronic systolic CHF (congestive heart failure) (HCC) ICD-10-CM: I50.23  ICD-9-CM: 428.23, 428.0  2019 Yes                Review of Systems:   A comprehensive review of systems was negative. Vital Signs:    Last 24hrs VS reviewed since prior progress note. Most recent are:  Visit Vitals  /66   Pulse 71   Temp 97.8 °F (36.6 °C)   Resp 19   Wt 100.4 kg (221 lb 5.5 oz)   SpO2 100%   BMI 28.42 kg/m²         Intake/Output Summary (Last 24 hours) at 6/2/2019 0804  Last data filed at 6/2/2019 0630  Gross per 24 hour   Intake 240 ml   Output 2675 ml   Net -2435 ml        Physical Examination:             Constitutional:  No acute distress   ENT:  MMM   Resp:  Crackles+   CV:  Regular rhythm, normal rate    GI:  Soft, non distended,BS+    Musculoskeletal:  edema +    Neurologic:  Moves all extremities. AAOx3, CN II-XII reviewed     Psych:  Good insight, Not anxious nor agitated. Data Review:    I personally reviewed  Image and labs      Labs:     Recent Labs     06/01/19 0416 05/31/19 1911   WBC 5.5 6.3   HGB 12.4 12.9   HCT 38.9 41.3   * 158     Recent Labs     06/01/19 0416 05/31/19 1911    140   K 3.9 4.1    107   CO2 29 28   BUN 24* 27*   CREA 1.36* 1.55*   GLU 96 97   CA 8.3* 8.8   MG 2.2  --    PHOS 3.4  --      Recent Labs     06/01/19 0416 05/31/19 1911   SGOT 12* 14*   ALT 21 26   AP 76 79   TBILI 0.8 0.8   TP 6.7 7.2   ALB 3.3* 3.6   GLOB 3.4 3.6   LPSE  --  57*     No results for input(s): INR, PTP, APTT in the last 72 hours. No lab exists for component: INREXT, INREXT   No results for input(s): FE, TIBC, PSAT, FERR in the last 72 hours. No results found for: FOL, RBCF   No results for input(s): PH, PCO2, PO2 in the last 72 hours.   Recent Labs     06/01/19 0416 05/31/19 1911   TROIQ <0.05 <0.05     Lab Results   Component Value Date/Time    Cholesterol, total 64 06/01/2019 04:16 AM    HDL Cholesterol 39 06/01/2019 04:16 AM    LDL, calculated 14 06/01/2019 04:16 AM    Triglyceride 55 06/01/2019 04:16 AM    CHOL/HDL Ratio 1.6 06/01/2019 04:16 AM     No results found for: GLUCPOC  No results found for: COLOR, APPRN, SPGRU, REFSG, NAN, PROTU, GLUCU, KETU, BILU, UROU, RAMONE, LEUKU, GLUKE, EPSU, BACTU, WBCU, RBCU, CASTS, UCRY      Medications Reviewed:     Current Facility-Administered Medications   Medication Dose Route Frequency    amiodarone (CORDARONE) tablet 200 mg  200 mg Oral DAILY    apixaban (ELIQUIS) tablet 5 mg  5 mg Oral Q12H    aspirin delayed-release tablet 81 mg  81 mg Oral DAILY    bumetanide (BUMEX) injection 1 mg  1 mg IntraVENous BID    carvedilol (COREG) tablet 12.5 mg  12.5 mg Oral BID WITH MEALS    . PHARMACY TO SUBSTITUTE PER PROTOCOL (Reordered from: evolocumab (REPATHA SURECLICK) pen injection)    Per Protocol    sacubitril-valsartan (ENTRESTO) 24-26 mg tablet 1 Tab  1 Tab Oral BID    spironolactone (ALDACTONE) tablet 25 mg  25 mg Oral DAILY    sodium chloride (NS) flush 5-40 mL  5-40 mL IntraVENous Q8H    sodium chloride (NS) flush 5-40 mL  5-40 mL IntraVENous PRN    ondansetron (ZOFRAN) injection 4 mg  4 mg IntraVENous Q4H PRN    bisacodyl (DULCOLAX) tablet 5 mg  5 mg Oral DAILY PRN    tamsulosin (FLOMAX) capsule 0.4 mg  0.4 mg Oral DAILY    acetaminophen (TYLENOL) tablet 650 mg  650 mg Oral Q4H PRN    morphine injection 2 mg  2 mg IntraVENous Q4H PRN     ______________________________________________________________________  EXPECTED LENGTH OF STAY: - - -  ACTUAL LENGTH OF STAY:          2                 Sony Martínez MD

## 2019-06-02 NOTE — PROGRESS NOTES
[facility] Cardiology Progress Note                                        Admit Date: 5/31/2019      Assessment/Plan:   Anya Bingham is admitted with sob. # Acute on chornic systolic HF -  - improving with diuretics. Still needs more fluid off.  - cont coreg  - entresto     # AF - in SR now, cont eliquis and coreg and amiodarone. - Consider ablation in near future. Recent trials [place] AF and john subanalysis have shown improved HF outcomes with maintance of SR with ablation in this population.     # LBBB - Reviewed office ECGs he has progressed recently with LBBB. Would given lv dysfunciton would consider upgrade in follow up. Subjective:     Mild improvement. Still had some orthopnea yesterday. Urinary retention needed straight cath. Visit Vitals  /70   Pulse 79   Temp 98.3 °F (36.8 °C)   Resp 22   Wt 100.4 kg (221 lb 5.5 oz)   SpO2 100%   BMI 28.42 kg/m²       Intake/Output Summary (Last 24 hours) at 6/2/2019 1980  Last data filed at 6/2/2019 0630  Gross per 24 hour   Intake    Output 2275 ml   Net -2275 ml       Objective:      Physical Exam:  HEENT: EOMI  Neck: supple  Resp:  Wheezing and mildly decreased at bases  CV: RRR s1s2 No murmur no s3  Abd:Soft, Nontender  Ext: trace edema  Neuro: Alert and oriented; Nonfocal  Skin: Warm, Dry, Intact      Telemetry:SR    Current Facility-Administered Medications   Medication Dose Route Frequency    amiodarone (CORDARONE) tablet 200 mg  200 mg Oral DAILY    apixaban (ELIQUIS) tablet 5 mg  5 mg Oral Q12H    aspirin delayed-release tablet 81 mg  81 mg Oral DAILY    bumetanide (BUMEX) injection 1 mg  1 mg IntraVENous BID    carvedilol (COREG) tablet 12.5 mg  12.5 mg Oral BID WITH MEALS    . PHARMACY TO SUBSTITUTE PER PROTOCOL (Reordered from: evolocumab (REPATHA SURECLICK) pen injection)    Per Protocol    sacubitril-valsartan (ENTRESTO) 24-26 mg tablet 1 Tab  1 Tab Oral BID    spironolactone (ALDACTONE) tablet 25 mg  25 mg Oral DAILY    sodium chloride (NS) flush 5-40 mL  5-40 mL IntraVENous Q8H    sodium chloride (NS) flush 5-40 mL  5-40 mL IntraVENous PRN    ondansetron (ZOFRAN) injection 4 mg  4 mg IntraVENous Q4H PRN    bisacodyl (DULCOLAX) tablet 5 mg  5 mg Oral DAILY PRN    tamsulosin (FLOMAX) capsule 0.4 mg  0.4 mg Oral DAILY    acetaminophen (TYLENOL) tablet 650 mg  650 mg Oral Q4H PRN    morphine injection 2 mg  2 mg IntraVENous Q4H PRN         Data Review:   Labs:  No results found for this or any previous visit (from the past 24 hour(s)).

## 2019-06-02 NOTE — PROGRESS NOTES
Problem: Heart Failure: Day 3  Goal: Activity/Safety  Outcome: Progressing Towards Goal  Pt remains free of falls during admission. Call bell and frequently used items within reach. Bedside table within reach. Patient provided non skid socks and instructed to call out for nurse when in need of assistance. Goal: Diagnostic Test/Procedures  Outcome: Progressing Towards Goal  BMP, CBC drawn today     Goal: Nutrition/Diet  Outcome: Progressing Towards Goal  Pt tolerating prescribed cardiac diet    Goal: Medications  Outcome: Progressing Towards Goal  Pt on Coreg, aspirin, amiodarone, bumex, spironolactone, entresto, eliquis. Goal: Respiratory  Outcome: Progressing Towards Goal  Pt weaned to RA now    Goal: *Oxygen saturation within defined limits  Outcome: Progressing Towards Goal  Pt's O2 at a 100% on RA. Goal: *Hemodynamically stable  Outcome: Progressing Towards Goal  Patient Vitals for the past 12 hrs:   Temp Pulse Resp BP SpO2   06/02/19 1651 -- 73 -- 95/60 --   06/02/19 1508 97.6 °F (36.4 °C) 70 18 90/60 100 %   06/02/19 1116 98.2 °F (36.8 °C) 76 20 97/62 100 %   06/02/19 1036 -- 72 -- 106/70 --   06/02/19 0818 -- 79 -- 109/70 --   06/02/19 0805 98.3 °F (36.8 °C) 74 22 104/64 100 %       Goal: *Optimal pain control at patient's stated goal  Outcome: Progressing Towards Goal  Assessed pain charecteristics Q4 using numeric scale, monitored for change in patient's condition, pain relief reassessment for patient's stated pain goal.     Goal: *Anxiety reduced or absent  Outcome: Progressing Towards Goal  Family at bedside. Assessed pt for anxiety as needed.

## 2019-06-03 ENCOUNTER — DOCUMENTATION ONLY (OUTPATIENT)
Dept: CASE MANAGEMENT | Age: 69
End: 2019-06-03

## 2019-06-03 ENCOUNTER — PATIENT OUTREACH (OUTPATIENT)
Dept: CASE MANAGEMENT | Age: 69
End: 2019-06-03

## 2019-06-03 LAB
ANION GAP SERPL CALC-SCNC: 6 MMOL/L (ref 5–15)
BASOPHILS # BLD: 0 K/UL (ref 0–0.1)
BASOPHILS NFR BLD: 1 % (ref 0–1)
BUN SERPL-MCNC: 21 MG/DL (ref 6–20)
BUN/CREAT SERPL: 13 (ref 12–20)
CALCIUM SERPL-MCNC: 8.6 MG/DL (ref 8.5–10.1)
CHLORIDE SERPL-SCNC: 103 MMOL/L (ref 97–108)
CO2 SERPL-SCNC: 28 MMOL/L (ref 21–32)
CREAT SERPL-MCNC: 1.59 MG/DL (ref 0.7–1.3)
DIFFERENTIAL METHOD BLD: ABNORMAL
EOSINOPHIL # BLD: 0.1 K/UL (ref 0–0.4)
EOSINOPHIL NFR BLD: 2 % (ref 0–7)
ERYTHROCYTE [DISTWIDTH] IN BLOOD BY AUTOMATED COUNT: 14 % (ref 11.5–14.5)
GLUCOSE SERPL-MCNC: 104 MG/DL (ref 65–100)
HCT VFR BLD AUTO: 35.9 % (ref 36.6–50.3)
HGB BLD-MCNC: 11.7 G/DL (ref 12.1–17)
IMM GRANULOCYTES # BLD AUTO: 0 K/UL (ref 0–0.04)
IMM GRANULOCYTES NFR BLD AUTO: 0 % (ref 0–0.5)
LYMPHOCYTES # BLD: 0.7 K/UL (ref 0.8–3.5)
LYMPHOCYTES NFR BLD: 13 % (ref 12–49)
MCH RBC QN AUTO: 31 PG (ref 26–34)
MCHC RBC AUTO-ENTMCNC: 32.6 G/DL (ref 30–36.5)
MCV RBC AUTO: 95 FL (ref 80–99)
MONOCYTES # BLD: 0.6 K/UL (ref 0–1)
MONOCYTES NFR BLD: 12 % (ref 5–13)
NEUTS SEG # BLD: 3.8 K/UL (ref 1.8–8)
NEUTS SEG NFR BLD: 73 % (ref 32–75)
NRBC # BLD: 0 K/UL (ref 0–0.01)
NRBC BLD-RTO: 0 PER 100 WBC
PLATELET # BLD AUTO: 138 K/UL (ref 150–400)
PMV BLD AUTO: 9.6 FL (ref 8.9–12.9)
POTASSIUM SERPL-SCNC: 3.9 MMOL/L (ref 3.5–5.1)
RBC # BLD AUTO: 3.78 M/UL (ref 4.1–5.7)
SODIUM SERPL-SCNC: 137 MMOL/L (ref 136–145)
WBC # BLD AUTO: 5.3 K/UL (ref 4.1–11.1)

## 2019-06-03 PROCEDURE — 85025 COMPLETE CBC W/AUTO DIFF WBC: CPT

## 2019-06-03 PROCEDURE — 80048 BASIC METABOLIC PNL TOTAL CA: CPT

## 2019-06-03 PROCEDURE — 5A09557 ASSISTANCE WITH RESPIRATORY VENTILATION, GREATER THAN 96 CONSECUTIVE HOURS, CONTINUOUS POSITIVE AIRWAY PRESSURE: ICD-10-PCS | Performed by: INTERNAL MEDICINE

## 2019-06-03 PROCEDURE — 65660000001 HC RM ICU INTERMED STEPDOWN

## 2019-06-03 PROCEDURE — 74011250637 HC RX REV CODE- 250/637: Performed by: INTERNAL MEDICINE

## 2019-06-03 PROCEDURE — 94660 CPAP INITIATION&MGMT: CPT

## 2019-06-03 PROCEDURE — 77010033678 HC OXYGEN DAILY

## 2019-06-03 PROCEDURE — 74011000250 HC RX REV CODE- 250: Performed by: INTERNAL MEDICINE

## 2019-06-03 PROCEDURE — 36415 COLL VENOUS BLD VENIPUNCTURE: CPT

## 2019-06-03 PROCEDURE — 74011250637 HC RX REV CODE- 250/637: Performed by: HOSPITALIST

## 2019-06-03 RX ORDER — FUROSEMIDE 80 MG/1
80 TABLET ORAL DAILY
COMMUNITY
End: 2019-06-26

## 2019-06-03 RX ADMIN — CARVEDILOL 12.5 MG: 12.5 TABLET, FILM COATED ORAL at 08:36

## 2019-06-03 RX ADMIN — SACUBITRIL AND VALSARTAN 1 TABLET: 24; 26 TABLET, FILM COATED ORAL at 21:06

## 2019-06-03 RX ADMIN — TAMSULOSIN HYDROCHLORIDE 0.4 MG: 0.4 CAPSULE ORAL at 08:36

## 2019-06-03 RX ADMIN — APIXABAN 5 MG: 5 TABLET, FILM COATED ORAL at 21:06

## 2019-06-03 RX ADMIN — Medication 10 ML: at 21:06

## 2019-06-03 RX ADMIN — SPIRONOLACTONE 25 MG: 25 TABLET, FILM COATED ORAL at 08:35

## 2019-06-03 RX ADMIN — SACUBITRIL AND VALSARTAN 1 TABLET: 24; 26 TABLET, FILM COATED ORAL at 11:48

## 2019-06-03 RX ADMIN — CARVEDILOL 12.5 MG: 12.5 TABLET, FILM COATED ORAL at 18:34

## 2019-06-03 RX ADMIN — ASPIRIN 81 MG: 81 TABLET ORAL at 08:36

## 2019-06-03 RX ADMIN — BUMETANIDE 1 MG: 0.25 INJECTION INTRAMUSCULAR; INTRAVENOUS at 08:36

## 2019-06-03 RX ADMIN — Medication 10 ML: at 06:00

## 2019-06-03 RX ADMIN — AMIODARONE HYDROCHLORIDE 200 MG: 200 TABLET ORAL at 08:35

## 2019-06-03 RX ADMIN — Medication 10 ML: at 14:00

## 2019-06-03 RX ADMIN — BUMETANIDE 1 MG: 0.25 INJECTION INTRAMUSCULAR; INTRAVENOUS at 17:41

## 2019-06-03 RX ADMIN — ACETAMINOPHEN 650 MG: 325 TABLET ORAL at 12:17

## 2019-06-03 RX ADMIN — APIXABAN 5 MG: 5 TABLET, FILM COATED ORAL at 08:36

## 2019-06-03 RX ADMIN — Medication 10 ML: at 08:36

## 2019-06-03 NOTE — PROGRESS NOTES
[address] [facility] met with patient's spouse Nathaly King today to provide an introduction to self, the [id number] at [facility]. Bundled Payment Care Program:    Patient was provided a copy of the [facility] letter. Patient states that they have a functioning scale at home. Patient was not provided a scale during this visit. Reinforced with patient's wife the importance of checking their weight at the same time daily and calling the cardiologist if their weight is up 3 lbs. in a day or 5 lbs. in a week (or per MD guidelines). Patient  has received a \"Living with Heart Failure\" patient education book. Hug At Home Program:    Provided patient's wife demonstration of Hug At 's on training iPad. Patient was interested in the [address] program but would like for the patient to see the [address] program demonstration before accepting . This writer will follow up with patient and spouse tomorrow as patient is sleeping at present.

## 2019-06-03 NOTE — NURSE NAVIGATOR
Chart reviewed by Heart Failure Nurse Navigator. Heart Failure database completed. EF:  21/25%     ACEi/ARB/ARNi: sacubitril/valsartan 24/26 mg twice daily    BB: coreg 12. 5 mg twice daily    Aldosterone Antagonist: spironolactone 25 mg daily    Obstructive Sleep Apnea Screening:   STOP-BANG score:   Referred to Sleep Medicine:     CRT: AICD implanted. NYHA Functional Class II/III on admission. Heart Failure Teach Back in Patient Education. Heart Failure Avoiding Triggers on Discharge Instructions. Cardiologist: Dr. Hiral Harris ([facility])    Post discharge follow up phone call to be made within 48-72 hours of discharge.       MEDICARE HF BUNDLE ACTIVE

## 2019-06-03 NOTE — CARDIO/PULMONARY
Cardiac Rehab: Living with Heart Failure Booklet given to [address] by staff on admit. Met with the pt., and his wife to review HF education. His wife has been documenting his daily weights on the June calender. Educated using teach back method. Discussed diagnosis definition and assessed patient understanding. Reviewed importance of daily weight monitoring and Low Sodium diet (2181-4303 mg. daily). Encouraged activity and rest periods within symptom limitations and as ordered by physician. Reviewed bumex, purpose of medication, potential side effects, compliance, and what to do if dose is missed. Discussed importance of reporting signs and symptoms of exacerbation, and when to report them to the doctor, to prevent re-hospitalization. [address] was encouraged to keep all appointments with doctor. Discussed the Cardiac Rehab Program, benefits, format, and encouraged enrollment, when eligible. Patient is interested and we will follow up, by phone, once eligible.   Afshan Maravilla RN

## 2019-06-03 NOTE — PROGRESS NOTES
Cardiology Progress Note    Pt seen and examined. Reports he still had PND last pm relieved by BiPap  Mild urinary retention persists            [facility] Cardiology Progress Note                                        Admit Date: 5/31/2019        Assessment/Plan:   # Acute on chornic systolic HF -  - improving with diuretics, but still needs more fluid off; low BP limiting some. - cont coreg, spironolactone  - entresto  -compression socks  -Bipap for home?      # AF - in SR now, cont eliquis and coreg and amiodarone. - Consider ablation in near future.      # LBBB - Reviewed office ECGs he has progressed recently with LBBB. Will upgrade to BiV ICD/pacer in near future-will check to see if able to do so while he is here. # acute on chronic kidney injury    Exam:  Blood pressure 99/63, pulse 77, temperature 98.8 °F (37.1 °C), resp. rate 16, weight 101.5 kg (223 lb 12.3 oz), SpO2 100 %.   Lungs clear  Cor reg with S3  Ext with moderate edema    Labs:  K 3.9; Na 137; Cr 1.59    Dhaval Coates MD

## 2019-06-03 NOTE — PROGRESS NOTES
Hospitalist Progress Note  Nhi Arias MD  Answering service: [phone] OR [phone] from in house phone        Date of Service:  6/3/2019  NAME:  Cornel Silverio  :  1950  MRN:  [id number]      Admission Summary: This is a 55-year-old man with past medical history significant for chronic systolic congestive heart failure, dyslipidemia, hypertension, atrial fibrillation status post cardioversion, was in his usual state of health until the day of presentation at the emergency room when the patient developed worsening of his shortness of breath. The inpatient underwent cardioversion early this morning by his cardiologist.      Interval history / Subjective:     Legs still swollen, BP stable off BIPAP seen by cardiology Dr. Kaleigh Wright:     1. Acute-on-chronic systolic congestive heart failure NYHA 2-3   - recent echocardiogram EF of 21%-25%. Pending report here      - On entresto hold for low BP cont diuresis on bumax and BB Neg  Around 1 L  - CTA of the chest is negative for pulmonary embolism. - Cardiology following   - cont entresto + coreg+ bumax + aldactone    2. Atrial fibrillation, status post cardioversion.    - on amiodarone  - Await further recommendation from the cardiologist.  We will check a TSH level. WNL  - Continue with Eliquis for anticoagulation. 3.  Dyslipidemia. We will resume preadmission medication. Check lipid profile. 4.  Hypertension. On entresto     5. Acute kidney injury. This is most likely as a result of the diuretic therapy.     - monitor repeat labs slightly worsen     Code status: Full  DVT prophylaxis: Eliquis    Care Plan discussed with: Patient/Family and Nurse  Disposition: TBD     Hospital Problems  Date Reviewed: 2019          Codes Class Noted POA    * (Principal) Acute on chronic systolic CHF (congestive heart failure) (HCC) ICD-10-CM: I50.23  ICD-9-CM: 428.23, 428.0  5/31/2019 Yes                Review of Systems:   A comprehensive review of systems was negative. Vital Signs:    Last 24hrs VS reviewed since prior progress note. Most recent are:  Visit Vitals  /68 (BP 1 Location: Left arm, BP Patient Position: Sitting)   Pulse 75   Temp 98 °F (36.7 °C)   Resp 18   Wt 101.5 kg (223 lb 12.3 oz)   SpO2 100%   BMI 28.73 kg/m²         Intake/Output Summary (Last 24 hours) at 6/3/2019 1043  Last data filed at 6/3/2019 0844  Gross per 24 hour   Intake 480 ml   Output 1275 ml   Net -795 ml        Physical Examination:             Constitutional:  No acute distress   ENT:  MMM   Resp:  Crackles+   CV:  Regular rhythm, normal rate    GI:  Soft, non distended,BS+    Musculoskeletal:  edema +    Neurologic:  Moves all extremities. AAOx3, CN II-XII reviewed     Psych:  Good insight, Not anxious nor agitated. Data Review:    I personally reviewed  Image and labs      Labs:     Recent Labs     06/03/19  0351 06/02/19  1029   WBC 5.3 5.4   HGB 11.7* 12.3   HCT 35.9* 39.4   * 145*     Recent Labs     06/03/19  0351 06/02/19  1029 06/01/19  0416    136 142   K 3.9 3.9 3.9    102 108   CO2 28 27 29   BUN 21* 19 24*   CREA 1.59* 1.23 1.36*   * 102* 96   CA 8.6 8.6 8.3*   MG  --   --  2.2   PHOS  --   --  3.4     Recent Labs     06/01/19  0416 05/31/19  1911   SGOT 12* 14*   ALT 21 26   AP 76 79   TBILI 0.8 0.8   TP 6.7 7.2   ALB 3.3* 3.6   GLOB 3.4 3.6   LPSE  --  57*     No results for input(s): INR, PTP, APTT in the last 72 hours. No lab exists for component: INREXT, INREXT   No results for input(s): FE, TIBC, PSAT, FERR in the last 72 hours. No results found for: FOL, RBCF   No results for input(s): PH, PCO2, PO2 in the last 72 hours.   Recent Labs     06/01/19  0416 05/31/19  1911   TROIQ <0.05 <0.05     Lab Results   Component Value Date/Time    Cholesterol, total 64 06/01/2019 04:16 AM    HDL Cholesterol 39 06/01/2019 04:16 AM    LDL, calculated 14 06/01/2019 04:16 AM    Triglyceride 55 06/01/2019 04:16 AM    CHOL/HDL Ratio 1.6 06/01/2019 04:16 AM     No results found for: GLUCPOC  No results found for: COLOR, APPRN, SPGRU, REFSG, NAN, PROTU, GLUCU, KETU, BILU, UROU, RAMONE, LEUKU, GLUKE, EPSU, BACTU, WBCU, RBCU, CASTS, UCRY      Medications Reviewed:     Current Facility-Administered Medications   Medication Dose Route Frequency    amiodarone (CORDARONE) tablet 200 mg  200 mg Oral DAILY    apixaban (ELIQUIS) tablet 5 mg  5 mg Oral Q12H    aspirin delayed-release tablet 81 mg  81 mg Oral DAILY    bumetanide (BUMEX) injection 1 mg  1 mg IntraVENous BID    carvedilol (COREG) tablet 12.5 mg  12.5 mg Oral BID WITH MEALS    sacubitril-valsartan (ENTRESTO) 24-26 mg tablet 1 Tab  1 Tab Oral BID    spironolactone (ALDACTONE) tablet 25 mg  25 mg Oral DAILY    sodium chloride (NS) flush 5-40 mL  5-40 mL IntraVENous Q8H    sodium chloride (NS) flush 5-40 mL  5-40 mL IntraVENous PRN    ondansetron (ZOFRAN) injection 4 mg  4 mg IntraVENous Q4H PRN    bisacodyl (DULCOLAX) tablet 5 mg  5 mg Oral DAILY PRN    tamsulosin (FLOMAX) capsule 0.4 mg  0.4 mg Oral DAILY    acetaminophen (TYLENOL) tablet 650 mg  650 mg Oral Q4H PRN    morphine injection 2 mg  2 mg IntraVENous Q4H PRN     ______________________________________________________________________  EXPECTED LENGTH OF STAY: 3d 7h  ACTUAL LENGTH OF STAY:          3                 Ronit Cardona MD

## 2019-06-03 NOTE — PROGRESS NOTES
Admission Medication Reconciliation:    Information obtained from: rx query    Significant PMH/Disease States:   Past Medical History:   Diagnosis Date    Degenerative disc disease, lumbar     Heart failure ([facility] [address]     High cholesterol     Hypertension     Paroxysmal atrial fibrillation ([facility] [address] 2019    Spinal stenosis          Allergies:  Patient has no known allergies. Prior to Admission Medications:   Prior to Admission Medications   Prescriptions Last Dose Informant Patient Reported? Taking?   amiodarone (CORDARONE) 200 mg tablet 2019 at Unknown time  Yes Yes   Sig: Take 200 mg by mouth daily. apixaban (ELIQUIS) 5 mg tablet 2019 at Unknown time  Yes Yes   Sig: Take 5 mg by mouth two (2) times a day. aspirin delayed-release 81 mg tablet 2019 at Unknown time  Yes Yes   Sig: Take 81 mg by mouth daily. bumetanide (BUMEX) 2 mg tablet 2019 at Unknown time  Yes Yes   Sig: Take 2 mg by mouth daily. carvedilol (COREG) 12.5 mg tablet 2019 at Unknown time  Yes Yes   Sig: Take 12.5 mg by mouth two (2) times daily (with meals). evolocumab (REPATHA SURECLICK) pen injection 8825 at Unknown time  Yes Yes   Si mg by SubCUTAneous route every fourteen (14) days. furosemide (LASIX) 80 mg tablet   Yes Yes   Sig: Take 80 mg by mouth daily. glucosamine/chondr vyas A sod (OSTEO BI-FLEX PO) 2019 at Unknown time  Yes Yes   Sig: Take 1 Tab by mouth two (2) times a day. sacubitril-valsartan (ENTRESTO) 24 mg/26 mg tablet 2019 at Unknown time  Yes Yes   Sig: Take 1 Tab by mouth two (2) times a day. spironolactone (ALDACTONE) 25 mg tablet 2019 at Unknown time  Yes Yes   Sig: Take 25 mg by mouth daily. Facility-Administered Medications: None         Comments/Recommendations: Added furosemide. No other changes made.

## 2019-06-03 NOTE — PROGRESS NOTES
Transitional Care Team: Initial G Note    Date of Assessment: 06/03/19  Time of Assessment:  5:22 PM    Assessment & Plan   No AMD on file--will ask if he has completed one and if not, will offer to help complete while he is here  Transitioning away from current PCP--offered to help them find a BS PCP who is not far from their home, such as in [place]; they are considering this  Hypotension--systolic of 90 this evening--nurse is holding Coreg for now  BiPAP use--has required this a couple of times today; follow to determine how oxygen requirement changes with time and more diuresing  Wife cooks--ask Shi Guerrero if she has a low-salt cookbook she can drop off for them     Primary Diagnoses:   Acute-on-chronic systolic congestive heart failure NYHA 2-3   - recent echocardiogram EF of 21%-25%.    Atrial fibrillation, status post cardioversion.    Dyslipidemia  Hypertension   Acute kidney injury    Advance Directive: not on file. Admission medication reconciliation was performed by registered pharmacist.      Discharge Needs: TBD. Lives in [place], [place]     Awareness of medical conditions: reports he has had AFib for the last 2 months and his cardiologist felt this was contributing to his fatigue, SOB, and swelling. Had cardioversion but later that afternoon, called Dr. Adelita Paul reporting that he was not feeling any better after the cardioversion. She advised, per pt, to come to ED if not improving and so he did. Knows that he has HF and a low EF. We reviewed the causes for fluid retention and he reports knowledge and regular practice with daily weights and verbalizes when to call cardiology with weight gain. Reports that he tends to have swelling in his feet, ankles and abdomen. Also, reports fluid build-up in bladder (?) and attributes this also to HF. States he has had difficulty voiding and has had bladder scan done in hospital which led to [facility] cath with very small catheter.   Does not sound as if he has seen urologist in past for this. Also, states that he does not salt foods and knows to stay away from canned foods and frozen foods he could microwave. His wife does the cooking, and avoids salt. They live in [place] and are part of a close-knit family, which includes CM leader, Tom Pereira, who is their niece. .      Patient's willingness to go to SNF or inpt rehab if necessary: TBD.     MAJO NP will return with [facility] calender/follow up appointments/Ambulatory Nurse Navigation information when patient ready for discharge. [facility] information will not be given to patient since he lives outside of service area. Continue to follow CM documentation. Patient/family education focused on readmission zones as described as: The Red Zone: High risk for readmission, days 1-21                          The Yellow Zone: Moderate risk for readmission, days 22-29                          The Green Zone: Lower risk for readmission, days 30 and after    Rebecca Frost is a 76 y.o. male inpatient at [facility] admitted with shortness of breath that worsened after cardioversion that day, per ED notes; admitted on  5/31/19. Chart reviewed by Jeanine Levy NP and [facility] Understanding of Goals) program introduced to patient/family. The Transitional Care Team bridges the gaps in care and education surrounding discharge from the acute care facility. The objective is to empower the patient and family in taking a proactive role in the task of preventing readmission within the first thirty days after discharge from the acute care setting. The team is also involved in the efforts to reduce readmission to the acute care setting after stabilization and discharge from the acute care environment either to the skilled nursing facilities or community. The TC Team will follow patient from a distance while inpatient as well as be available for further transition disposition as needed. The JESÚS TEAM will continue to offer support during the 30- 90 day discharge from acute care setting. Will notify Ambulatory HF Nurse Navigators Iam Owens RN and Linda Woods RN, when patient's discharge date is known.     Past Medical History:   Diagnosis Date    Degenerative disc disease, lumbar     Heart failure ([address]     High cholesterol     Hypertension     Paroxysmal atrial fibrillation ([facility] [address] 4/2/2019    Spinal stenosis        Advance Care Planning 6/1/2019   Patient's Healthcare Decision Maker is: Legal Next of Kin   Confirm Advance Directive None

## 2019-06-03 NOTE — PROGRESS NOTES
Problem: Heart Failure: Day 2  Goal: Medications  Outcome: Progressing Towards Goal     Problem: Heart Failure: Day 2  Goal: Respiratory  Outcome: Progressing Towards Goal     Pt heart rhythm NSR w/ first degree, HR 80s, 100% on RA, RR 17. Education provided on heart failure medications including Eliquis. Pt. Verbalized understanding. Opportunity for questions provided. Last 3 Recorded Weights in this Encounter    06/01/19 0421 06/02/19 0554 06/03/19 0402   Weight: 100.5 kg (221 lb 9 oz) 100.4 kg (221 lb 5.5 oz) 101.5 kg (223 lb 12.3 oz)   Note weight discrepancy noted. Bedside shift change report given to Nadine Martinez (oncoming nurse) by Lb Sinclair RN (offgoing nurse). Report included the following information SBAR, Kardex, Intake/Output, MAR, Recent Results and Cardiac Rhythm NSR.

## 2019-06-04 ENCOUNTER — APPOINTMENT (OUTPATIENT)
Dept: GENERAL RADIOLOGY | Age: 69
DRG: 222 | End: 2019-06-04
Attending: NURSE PRACTITIONER
Payer: MEDICARE

## 2019-06-04 ENCOUNTER — DOCUMENTATION ONLY (OUTPATIENT)
Dept: CASE MANAGEMENT | Age: 69
End: 2019-06-04

## 2019-06-04 ENCOUNTER — PATIENT OUTREACH (OUTPATIENT)
Dept: CASE MANAGEMENT | Age: 69
End: 2019-06-04

## 2019-06-04 LAB
ALBUMIN SERPL-MCNC: 2.9 G/DL (ref 3.5–5)
ALBUMIN/GLOB SERPL: 0.8 {RATIO} (ref 1.1–2.2)
ALP SERPL-CCNC: 77 U/L (ref 45–117)
ALT SERPL-CCNC: 51 U/L (ref 12–78)
ANION GAP SERPL CALC-SCNC: 8 MMOL/L (ref 5–15)
APPEARANCE UR: ABNORMAL
AST SERPL-CCNC: 98 U/L (ref 15–37)
BACTERIA URNS QL MICRO: NEGATIVE /HPF
BASOPHILS # BLD: 0 K/UL (ref 0–0.1)
BASOPHILS NFR BLD: 0 % (ref 0–1)
BILIRUB SERPL-MCNC: 1.4 MG/DL (ref 0.2–1)
BILIRUB UR QL: NEGATIVE
BUN SERPL-MCNC: 22 MG/DL (ref 6–20)
BUN/CREAT SERPL: 11 (ref 12–20)
CALCIUM SERPL-MCNC: 8.5 MG/DL (ref 8.5–10.1)
CHLORIDE SERPL-SCNC: 100 MMOL/L (ref 97–108)
CO2 SERPL-SCNC: 26 MMOL/L (ref 21–32)
COLOR UR: ABNORMAL
CREAT SERPL-MCNC: 1.94 MG/DL (ref 0.7–1.3)
DIFFERENTIAL METHOD BLD: ABNORMAL
EOSINOPHIL # BLD: 0 K/UL (ref 0–0.4)
EOSINOPHIL NFR BLD: 0 % (ref 0–7)
EPITH CASTS URNS QL MICRO: ABNORMAL /LPF
ERYTHROCYTE [DISTWIDTH] IN BLOOD BY AUTOMATED COUNT: 14.2 % (ref 11.5–14.5)
GLOBULIN SER CALC-MCNC: 3.6 G/DL (ref 2–4)
GLUCOSE SERPL-MCNC: 107 MG/DL (ref 65–100)
GLUCOSE UR STRIP.AUTO-MCNC: NEGATIVE MG/DL
HCT VFR BLD AUTO: 36.2 % (ref 36.6–50.3)
HGB BLD-MCNC: 11.6 G/DL (ref 12.1–17)
HGB UR QL STRIP: ABNORMAL
HYALINE CASTS URNS QL MICRO: ABNORMAL /LPF (ref 0–5)
IMM GRANULOCYTES # BLD AUTO: 0.1 K/UL (ref 0–0.04)
IMM GRANULOCYTES NFR BLD AUTO: 1 % (ref 0–0.5)
KETONES UR QL STRIP.AUTO: NEGATIVE MG/DL
LACTATE SERPL-SCNC: 1.1 MMOL/L (ref 0.4–2)
LEUKOCYTE ESTERASE UR QL STRIP.AUTO: ABNORMAL
LYMPHOCYTES # BLD: 0.4 K/UL (ref 0.8–3.5)
LYMPHOCYTES NFR BLD: 5 % (ref 12–49)
MAGNESIUM SERPL-MCNC: 2.1 MG/DL (ref 1.6–2.4)
MCH RBC QN AUTO: 30.5 PG (ref 26–34)
MCHC RBC AUTO-ENTMCNC: 32 G/DL (ref 30–36.5)
MCV RBC AUTO: 95.3 FL (ref 80–99)
MONOCYTES # BLD: 0.8 K/UL (ref 0–1)
MONOCYTES NFR BLD: 10 % (ref 5–13)
NEUTS SEG # BLD: 6.7 K/UL (ref 1.8–8)
NEUTS SEG NFR BLD: 84 % (ref 32–75)
NITRITE UR QL STRIP.AUTO: POSITIVE
NRBC # BLD: 0 K/UL (ref 0–0.01)
NRBC BLD-RTO: 0 PER 100 WBC
PH UR STRIP: 5.5 [PH] (ref 5–8)
PHOSPHATE SERPL-MCNC: 2.4 MG/DL (ref 2.6–4.7)
PLATELET # BLD AUTO: 135 K/UL (ref 150–400)
PMV BLD AUTO: 10 FL (ref 8.9–12.9)
POTASSIUM SERPL-SCNC: 4.2 MMOL/L (ref 3.5–5.1)
PROT SERPL-MCNC: 6.5 G/DL (ref 6.4–8.2)
PROT UR STRIP-MCNC: 30 MG/DL
RBC # BLD AUTO: 3.8 M/UL (ref 4.1–5.7)
RBC #/AREA URNS HPF: ABNORMAL /HPF (ref 0–5)
SODIUM SERPL-SCNC: 134 MMOL/L (ref 136–145)
SP GR UR REFRACTOMETRY: 1.02 (ref 1–1.03)
TROPONIN I SERPL-MCNC: <0.05 NG/ML
UA: UC IF INDICATED,UAUC: ABNORMAL
UROBILINOGEN UR QL STRIP.AUTO: 1 EU/DL (ref 0.2–1)
WBC # BLD AUTO: 8 K/UL (ref 4.1–11.1)
WBC URNS QL MICRO: >100 /HPF (ref 0–4)

## 2019-06-04 PROCEDURE — 51798 US URINE CAPACITY MEASURE: CPT

## 2019-06-04 PROCEDURE — 74011250637 HC RX REV CODE- 250/637: Performed by: INTERNAL MEDICINE

## 2019-06-04 PROCEDURE — 87086 URINE CULTURE/COLONY COUNT: CPT

## 2019-06-04 PROCEDURE — 84100 ASSAY OF PHOSPHORUS: CPT

## 2019-06-04 PROCEDURE — 65660000001 HC RM ICU INTERMED STEPDOWN

## 2019-06-04 PROCEDURE — 36415 COLL VENOUS BLD VENIPUNCTURE: CPT

## 2019-06-04 PROCEDURE — 84484 ASSAY OF TROPONIN QUANT: CPT

## 2019-06-04 PROCEDURE — 94660 CPAP INITIATION&MGMT: CPT

## 2019-06-04 PROCEDURE — 74011250637 HC RX REV CODE- 250/637: Performed by: NURSE PRACTITIONER

## 2019-06-04 PROCEDURE — 83735 ASSAY OF MAGNESIUM: CPT

## 2019-06-04 PROCEDURE — 81001 URINALYSIS AUTO W/SCOPE: CPT

## 2019-06-04 PROCEDURE — 87077 CULTURE AEROBIC IDENTIFY: CPT

## 2019-06-04 PROCEDURE — 85025 COMPLETE CBC W/AUTO DIFF WBC: CPT

## 2019-06-04 PROCEDURE — 74022 RADEX COMPL AQT ABD SERIES: CPT

## 2019-06-04 PROCEDURE — 87040 BLOOD CULTURE FOR BACTERIA: CPT

## 2019-06-04 PROCEDURE — 87186 SC STD MICRODIL/AGAR DIL: CPT

## 2019-06-04 PROCEDURE — 80053 COMPREHEN METABOLIC PANEL: CPT

## 2019-06-04 PROCEDURE — 83605 ASSAY OF LACTIC ACID: CPT

## 2019-06-04 RX ORDER — TAMSULOSIN HYDROCHLORIDE 0.4 MG/1
0.4 CAPSULE ORAL DAILY
Status: DISCONTINUED | OUTPATIENT
Start: 2019-06-04 | End: 2019-06-05

## 2019-06-04 RX ORDER — ACETAMINOPHEN 500 MG
1000 TABLET ORAL ONCE
Status: COMPLETED | OUTPATIENT
Start: 2019-06-04 | End: 2019-06-04

## 2019-06-04 RX ADMIN — Medication 10 ML: at 20:27

## 2019-06-04 RX ADMIN — APIXABAN 5 MG: 5 TABLET, FILM COATED ORAL at 20:26

## 2019-06-04 RX ADMIN — ACETAMINOPHEN 650 MG: 325 TABLET ORAL at 12:15

## 2019-06-04 RX ADMIN — Medication 10 ML: at 07:18

## 2019-06-04 RX ADMIN — ASPIRIN 81 MG: 81 TABLET ORAL at 09:57

## 2019-06-04 RX ADMIN — Medication 10 ML: at 14:00

## 2019-06-04 RX ADMIN — AMIODARONE HYDROCHLORIDE 200 MG: 200 TABLET ORAL at 09:57

## 2019-06-04 RX ADMIN — TAMSULOSIN HYDROCHLORIDE 0.4 MG: 0.4 CAPSULE ORAL at 20:26

## 2019-06-04 RX ADMIN — ACETAMINOPHEN 1000 MG: 500 TABLET ORAL at 04:06

## 2019-06-04 RX ADMIN — APIXABAN 5 MG: 5 TABLET, FILM COATED ORAL at 09:58

## 2019-06-04 RX ADMIN — CARVEDILOL 6.25 MG: 12.5 TABLET, FILM COATED ORAL at 09:58

## 2019-06-04 RX ADMIN — SPIRONOLACTONE 25 MG: 25 TABLET, FILM COATED ORAL at 09:57

## 2019-06-04 NOTE — PROGRESS NOTES
0900: Notified Dr. Kelsey Mays that the pts SBP is 95. Ambulated patient in hallway, BP still soft. MD added parameters to cardiac meds, continuing to monitor. 1230: Notified Dr. Keara Holbrook that pt had new onset fever overnight and has a current fever of 100.3. Tylenol given; U/A, urine culture, blood culture, and pending XR ordered overnight. 1650: Notified Dr. Keara Holbrook that pt hasn't needed PRN bipap, has been on RA-2 LNC today. Per MD, pt can travel to radiology without nurse or monitor. 1930: Bedside shift change report given to Sherren Laud, RN (oncoming nurse) by Manuel Galloway RN (offgoing nurse). Report included the following information SBAR, ED Summary, Intake/Output, MAR, Recent Results, Med Rec Status and Cardiac Rhythm NSR.

## 2019-06-04 NOTE — PROGRESS NOTES
Transitional Care Team: Follow-Up [facility] Note        Assessment & Plan   No AMD on file--stopped by to see if he is interested in completing, but is resting quietly in bed on his side and his wife is not present. Will check back later. Per cardiology note, has progressed recently to BB--Dr. [place] Sinclair considering upgrade to [facility] ICD and may be able to do so this admission  Fevers over the last 24 hours noted--101.4 max  WBC still normal but elevated today over yesterday  Cr trending up  Prd blood cultures and urine culture obtained today--track for final results       Will follow up later and watch for visit from wife for possibility of getting AMD done.

## 2019-06-04 NOTE — PROGRESS NOTES
[facility] Cardiology Progress Note                                        BLAOW Date: 5/31/2019        Assessment/Plan:   # Acute on crornic systolic HF -  - improving with diuretics, but creat increasing; low BP may be culprit; will stop entresto for a day or two to see if this improves BP/Cr. - cont coreg, spironolactone  -compression socks  -Bipap for home?      # AF - in SR now, cont eliquis and coreg and amiodarone. - Consider ablation in near future.      # LBBB - Reviewed office ECGs he has progressed recently with LBBB. Will upgrade to BiV ICD/pacer in near future-will check to see if able to do so while he is here.     # acute on chronic kidney injury-worsening, poss due to low BP; hold entresto and move flomax to bedtime; monitor    # fever- cultures and Xray pending; no obvious source    Exam:  Blood pressure 104/58, pulse 79, temperature 98.9 °F (37.2 °C), resp. rate 24, weight 99.3 kg (218 lb 14.7 oz), SpO2 99 %.   Tm 101.4  UO 2600 in last two days  Lungs clear  Cor reg with S3  Mild LE edema L>R    Labs:  WBC normal  Cr 1.94  K 4.2    Man Bernstein MD

## 2019-06-04 NOTE — PROGRESS NOTES
Spiritual Care Partner Volunteer visited patient in [employer] on 6/4/19. Documented by:   Chaplain Tam MDiv, MACE  [phone] [phone])

## 2019-06-04 NOTE — PROGRESS NOTES
Problem: Heart Failure: Day 3  Goal: Diagnostic Test/Procedures  Outcome: Progressing Towards Goal  Goal: Respiratory  Outcome: Progressing Towards Goal  Note:   O2 sats stable on O2 2l/NC   Goal: *Oxygen saturation within defined limits  Outcome: Progressing Towards Goal  Goal: *Hemodynamically stable  Outcome: Progressing Towards Goal  Note:   Vitals stable  Goal: *Anxiety reduced or absent  Outcome: Progressing Towards Goal  Goal: *Demonstrates progressive activity  Outcome: Progressing Towards Goal     Problem: Falls - Risk of  Goal: *Absence of Falls  Description  Document Rosio Fall Risk and appropriate interventions in the flowsheet.   Outcome: Progressing Towards Goal  Note:   Fall Risk Interventions:     Medication Interventions: Evaluate medications/consider consulting pharmacy, Teach patient to arise slowly

## 2019-06-04 NOTE — PROGRESS NOTES
Problem: Heart Failure: Day 5  Goal: Medications  Outcome: Progressing Towards Goal  Goal: Respiratory  Outcome: Progressing Towards Goal     Pt on 2 LNC throughout the shift. Pt did not require PRN bipap today. Temp of 100.2, urine culture and XR pending. Pt did not receive IV bumex today due to soft BP.

## 2019-06-04 NOTE — PROGRESS NOTES
Hospitalist Progress Note    Subjective:   Daily Progress Note: 2019 11:07 AM    No sob or cp today    Current Facility-Administered Medications   Medication Dose Route Frequency    tamsulosin (FLOMAX) capsule 0.4 mg  0.4 mg Oral DAILY    amiodarone (CORDARONE) tablet 200 mg  200 mg Oral DAILY    apixaban (ELIQUIS) tablet 5 mg  5 mg Oral Q12H    aspirin delayed-release tablet 81 mg  81 mg Oral DAILY    bumetanide (BUMEX) injection 1 mg  1 mg IntraVENous BID    carvedilol (COREG) tablet 12.5 mg  12.5 mg Oral BID WITH MEALS    spironolactone (ALDACTONE) tablet 25 mg  25 mg Oral DAILY    sodium chloride (NS) flush 5-40 mL  5-40 mL IntraVENous Q8H    sodium chloride (NS) flush 5-40 mL  5-40 mL IntraVENous PRN    ondansetron (ZOFRAN) injection 4 mg  4 mg IntraVENous Q4H PRN    bisacodyl (DULCOLAX) tablet 5 mg  5 mg Oral DAILY PRN    acetaminophen (TYLENOL) tablet 650 mg  650 mg Oral Q4H PRN    morphine injection 2 mg  2 mg IntraVENous Q4H PRN        Review of Systems  A comprehensive review of systems was negative except for that written in the HPI. Objective:     Visit Vitals  BP 95/55   Pulse 77   Temp 98.9 °F (37.2 °C)   Resp 24   Wt 99.3 kg (218 lb 14.7 oz)   SpO2 99%   BMI 28.11 kg/m²    O2 Flow Rate (L/min): 2 l/min O2 Device: Room air    Temp (24hrs), Av.4 °F (37.4 °C), Min:98.2 °F (36.8 °C), Max:101.4 °F (38.6 °C)      701 -  1900  In: 220 [P.O.:220]  Out: -    190 -  0700  In: 580 [P.O.:580]  Out: 2295 [Urine:2295]    Visit Vitals  BP 95/55   Pulse 77   Temp 98.9 °F (37.2 °C)   Resp 24   Wt 99.3 kg (218 lb 14.7 oz)   SpO2 99%   BMI 28.11 kg/m²     General:  Alert, cooperative, no distress, appears stated age. Head:  Normocephalic, without obvious abnormality, atraumatic. Eyes:  Conjunctivae/corneas clear. PERRL, EOMs intact. Fundi benign   Ears:  Normal TMs and external ear canals both ears. Nose: Nares normal. Septum midline.  Mucosa normal. No drainage or sinus tenderness. Throat: Lips, mucosa, and tongue normal. Teeth and gums normal.   Neck: Supple, symmetrical, trachea midline, no adenopathy, thyroid: no enlargement/tenderness/nodules, no carotid bruit and no JVD. Back:   Symmetric, no curvature. ROM normal. No CVA tenderness. Lungs:   Clear to auscultation bilaterally. Chest wall:  No tenderness or deformity. Heart:  Regular rate and rhythm, S1, S2 normal, no murmur, click, rub or gallop. Abdomen:   Soft, non-tender. Bowel sounds normal. No masses,  No organomegaly. Genitalia:  Normal male without lesion, discharge or tenderness. Rectal:  Normal tone, normal prostate, no masses or tenderness  Guaiac negative stool. Extremities: Extremities normal, atraumatic, no cyanosis or edema. Pulses: 2+ and symmetric all extremities. Skin: Skin color, texture, turgor normal. No rashes or lesions   Lymph nodes: Cervical, supraclavicular, and axillary nodes normal.   Neurologic: CNII-XII intact. Normal strength, sensation and reflexes throughout. Additional comments:I reviewed the patient's new clinical lab test results. crt rising    Data Review    Recent Results (from the past 24 hour(s))   METABOLIC PANEL, COMPREHENSIVE    Collection Time: 06/04/19  4:09 AM   Result Value Ref Range    Sodium 134 (L) 136 - 145 mmol/L    Potassium 4.2 3.5 - 5.1 mmol/L    Chloride 100 97 - 108 mmol/L    CO2 26 21 - 32 mmol/L    Anion gap 8 5 - 15 mmol/L    Glucose 107 (H) 65 - 100 mg/dL    BUN 22 (H) 6 - 20 MG/DL    Creatinine 1.94 (H) 0.70 - 1.30 MG/DL    BUN/Creatinine ratio 11 (L) 12 - 20      GFR est AA 42 (L) >60 ml/min/1.73m2    GFR est non-AA 35 (L) >60 ml/min/1.73m2    Calcium 8.5 8.5 - 10.1 MG/DL    Bilirubin, total 1.4 (H) 0.2 - 1.0 MG/DL    ALT (SGPT) 51 12 - 78 U/L    AST (SGOT) 98 (H) 15 - 37 U/L    Alk.  phosphatase 77 45 - 117 U/L    Protein, total 6.5 6.4 - 8.2 g/dL    Albumin 2.9 (L) 3.5 - 5.0 g/dL    Globulin 3.6 2.0 - 4.0 g/dL    A-G Ratio 0.8 (L) 1.1 - 2.2     CBC WITH AUTOMATED DIFF    Collection Time: 06/04/19  4:09 AM   Result Value Ref Range    WBC 8.0 4.1 - 11.1 K/uL    RBC 3.80 (L) 4.10 - 5.70 M/uL    HGB 11.6 (L) 12.1 - 17.0 g/dL    HCT 36.2 (L) 36.6 - 50.3 %    MCV 95.3 80.0 - 99.0 FL    MCH 30.5 26.0 - 34.0 PG    MCHC 32.0 30.0 - 36.5 g/dL    RDW 14.2 11.5 - 14.5 %    PLATELET 455 (L) 653 - 400 K/uL    MPV 10.0 8.9 - 12.9 FL    NRBC 0.0 0  WBC    ABSOLUTE NRBC 0.00 0.00 - 0.01 K/uL    NEUTROPHILS 84 (H) 32 - 75 %    LYMPHOCYTES 5 (L) 12 - 49 %    MONOCYTES 10 5 - 13 %    EOSINOPHILS 0 0 - 7 %    BASOPHILS 0 0 - 1 %    IMMATURE GRANULOCYTES 1 (H) 0.0 - 0.5 %    ABS. NEUTROPHILS 6.7 1.8 - 8.0 K/UL    ABS. LYMPHOCYTES 0.4 (L) 0.8 - 3.5 K/UL    ABS. MONOCYTES 0.8 0.0 - 1.0 K/UL    ABS. EOSINOPHILS 0.0 0.0 - 0.4 K/UL    ABS. BASOPHILS 0.0 0.0 - 0.1 K/UL    ABS. IMM.  GRANS. 0.1 (H) 0.00 - 0.04 K/UL    DF AUTOMATED     TROPONIN I    Collection Time: 06/04/19  4:09 AM   Result Value Ref Range    Troponin-I, Qt. <0.05 <0.05 ng/mL   LACTIC ACID    Collection Time: 06/04/19  4:09 AM   Result Value Ref Range    Lactic acid 1.1 0.4 - 2.0 MMOL/L   MAGNESIUM    Collection Time: 06/04/19  4:09 AM   Result Value Ref Range    Magnesium 2.1 1.6 - 2.4 mg/dL   PHOSPHORUS    Collection Time: 06/04/19  4:09 AM   Result Value Ref Range    Phosphorus 2.4 (L) 2.6 - 4.7 MG/DL   CULTURE, BLOOD, PAIRED    Collection Time: 06/04/19  4:09 AM   Result Value Ref Range    Special Requests: NO SPECIAL REQUESTS      Culture result: NO GROWTH AFTER 3 HOURS     URINALYSIS W/ REFLEX CULTURE    Collection Time: 06/04/19  4:27 AM   Result Value Ref Range    Color YELLOW/STRAW      Appearance CLOUDY (A) CLEAR      Specific gravity 1.016 1.003 - 1.030      pH (UA) 5.5 5.0 - 8.0      Protein 30 (A) NEG mg/dL    Glucose NEGATIVE  NEG mg/dL    Ketone NEGATIVE  NEG mg/dL    Bilirubin NEGATIVE  NEG      Blood MODERATE (A) NEG      Urobilinogen 1.0 0.2 - 1.0 EU/dL    Nitrites POSITIVE (A) NEG      Leukocyte Esterase LARGE (A) NEG      WBC >100 (H) 0 - 4 /hpf    RBC 10-20 0 - 5 /hpf    Epithelial cells FEW FEW /lpf    Bacteria NEGATIVE  NEG /hpf    UA:UC IF INDICATED URINE CULTURE ORDERED (A) CNI      Hyaline cast 0-2 0 - 5 /lpf         Assessment/Plan:     Principal Problem:    Acute on chronic systolic CHF (congestive heart failure) ([facility] [address] (5/31/2019)      1.  Acute-on-chronic systolic congestive heart failure NYHA 2-3   - recent echocardiogram EF of 21%-25%. Pending report here 5/31     - On entresto hold for low BP remains on bumax and BB. Bmp in am and if remains elevated. consder holding diuretics  - CTA of the chest is negative for pulmonary embolism. - Cardiology following - possible biv pacer       2.  Atrial fibrillation, status post cardioversion.    - on amiodarone  - Continue with Eliquis for anticoagulation.     3.  Dyslipidemia.  ldl 14     4.  Hypertension. Hold entresto 2/2 grant     5.  Acute kidney injury.  check daily while on bumex     Code status: Full  DVT prophylaxis: Eliquis     Care Plan discussed with: Patient/Family and Nurse  Disposition: TBD    Care Plan discussed with: Patient/Family    Total time spent with patient: 20 minutes.

## 2019-06-04 NOTE — PROGRESS NOTES
[id number] [facility] met with patient today for follow up visit regarding  [facility] at [facility]. Reinforced the importance of checking their weight at the same time daily and calling the cardiologist if their weight is up 3 lbs. in a day or 5 lbs. in a week (or per MD guidelines). Patient given \"Don't Pass the Salt\" low sodium cookbook. Hug At Home Program:    Demonstration of Hug at [facility] as patient not eligible. All questions answered at this time. Patient verbalized understanding of all information discussed.

## 2019-06-05 ENCOUNTER — APPOINTMENT (OUTPATIENT)
Dept: ULTRASOUND IMAGING | Age: 69
DRG: 222 | End: 2019-06-05
Attending: INTERNAL MEDICINE
Payer: MEDICARE

## 2019-06-05 LAB
ANION GAP SERPL CALC-SCNC: 7 MMOL/L (ref 5–15)
BASOPHILS # BLD: 0 K/UL (ref 0–0.1)
BASOPHILS NFR BLD: 0 % (ref 0–1)
BUN SERPL-MCNC: 29 MG/DL (ref 6–20)
BUN/CREAT SERPL: 13 (ref 12–20)
CALCIUM SERPL-MCNC: 8.3 MG/DL (ref 8.5–10.1)
CHLORIDE SERPL-SCNC: 99 MMOL/L (ref 97–108)
CO2 SERPL-SCNC: 26 MMOL/L (ref 21–32)
CREAT SERPL-MCNC: 2.15 MG/DL (ref 0.7–1.3)
DIFFERENTIAL METHOD BLD: ABNORMAL
EOSINOPHIL # BLD: 0.1 K/UL (ref 0–0.4)
EOSINOPHIL NFR BLD: 1 % (ref 0–7)
ERYTHROCYTE [DISTWIDTH] IN BLOOD BY AUTOMATED COUNT: 14.3 % (ref 11.5–14.5)
GLUCOSE SERPL-MCNC: 111 MG/DL (ref 65–100)
HCT VFR BLD AUTO: 34.4 % (ref 36.6–50.3)
HGB BLD-MCNC: 11 G/DL (ref 12.1–17)
IMM GRANULOCYTES # BLD AUTO: 0 K/UL
IMM GRANULOCYTES NFR BLD AUTO: 0 %
LYMPHOCYTES # BLD: 0.4 K/UL (ref 0.8–3.5)
LYMPHOCYTES NFR BLD: 5 % (ref 12–49)
MCH RBC QN AUTO: 30.5 PG (ref 26–34)
MCHC RBC AUTO-ENTMCNC: 32 G/DL (ref 30–36.5)
MCV RBC AUTO: 95.3 FL (ref 80–99)
MONOCYTES # BLD: 0.7 K/UL (ref 0–1)
MONOCYTES NFR BLD: 8 % (ref 5–13)
NEUTS SEG # BLD: 7.3 K/UL (ref 1.8–8)
NEUTS SEG NFR BLD: 86 % (ref 32–75)
NRBC # BLD: 0 K/UL (ref 0–0.01)
NRBC BLD-RTO: 0 PER 100 WBC
PLATELET # BLD AUTO: 127 K/UL (ref 150–400)
PMV BLD AUTO: 9.6 FL (ref 8.9–12.9)
POTASSIUM SERPL-SCNC: 4.3 MMOL/L (ref 3.5–5.1)
RBC # BLD AUTO: 3.61 M/UL (ref 4.1–5.7)
RBC MORPH BLD: ABNORMAL
RBC MORPH BLD: ABNORMAL
SODIUM SERPL-SCNC: 132 MMOL/L (ref 136–145)
WBC # BLD AUTO: 8.5 K/UL (ref 4.1–11.1)

## 2019-06-05 PROCEDURE — 80048 BASIC METABOLIC PNL TOTAL CA: CPT

## 2019-06-05 PROCEDURE — 76770 US EXAM ABDO BACK WALL COMP: CPT

## 2019-06-05 PROCEDURE — 65660000001 HC RM ICU INTERMED STEPDOWN

## 2019-06-05 PROCEDURE — 74011250637 HC RX REV CODE- 250/637: Performed by: INTERNAL MEDICINE

## 2019-06-05 PROCEDURE — 74011000250 HC RX REV CODE- 250: Performed by: INTERNAL MEDICINE

## 2019-06-05 PROCEDURE — 74011000258 HC RX REV CODE- 258: Performed by: INTERNAL MEDICINE

## 2019-06-05 PROCEDURE — 36415 COLL VENOUS BLD VENIPUNCTURE: CPT

## 2019-06-05 PROCEDURE — 85025 COMPLETE CBC W/AUTO DIFF WBC: CPT

## 2019-06-05 PROCEDURE — 74011250636 HC RX REV CODE- 250/636: Performed by: INTERNAL MEDICINE

## 2019-06-05 RX ORDER — DOBUTAMINE HYDROCHLORIDE 200 MG/100ML
2.5-5 INJECTION INTRAVENOUS
Status: DISCONTINUED | OUTPATIENT
Start: 2019-06-05 | End: 2019-06-07

## 2019-06-05 RX ORDER — AMOXICILLIN AND CLAVULANATE POTASSIUM 500; 125 MG/1; MG/1
1 TABLET, FILM COATED ORAL EVERY 12 HOURS
Status: DISCONTINUED | OUTPATIENT
Start: 2019-06-05 | End: 2019-06-05

## 2019-06-05 RX ADMIN — ASPIRIN 81 MG: 81 TABLET ORAL at 09:03

## 2019-06-05 RX ADMIN — DOBUTAMINE IN DEXTROSE 2.5 MCG/KG/MIN: 200 INJECTION, SOLUTION INTRAVENOUS at 16:32

## 2019-06-05 RX ADMIN — APIXABAN 5 MG: 5 TABLET, FILM COATED ORAL at 09:03

## 2019-06-05 RX ADMIN — APIXABAN 5 MG: 5 TABLET, FILM COATED ORAL at 20:50

## 2019-06-05 RX ADMIN — Medication 10 ML: at 20:51

## 2019-06-05 RX ADMIN — Medication 10 ML: at 14:00

## 2019-06-05 RX ADMIN — AMIODARONE HYDROCHLORIDE 200 MG: 200 TABLET ORAL at 09:03

## 2019-06-05 RX ADMIN — DOBUTAMINE IN DEXTROSE 5 MCG/KG/MIN: 200 INJECTION, SOLUTION INTRAVENOUS at 18:46

## 2019-06-05 RX ADMIN — AMOXICILLIN AND CLAVULANATE POTASSIUM 1 TABLET: 500; 125 TABLET, FILM COATED ORAL at 09:03

## 2019-06-05 RX ADMIN — ACETAMINOPHEN 650 MG: 325 TABLET ORAL at 16:42

## 2019-06-05 RX ADMIN — ACETAMINOPHEN 650 MG: 325 TABLET ORAL at 11:10

## 2019-06-05 RX ADMIN — CEFTRIAXONE 1 G: 1 INJECTION, POWDER, FOR SOLUTION INTRAMUSCULAR; INTRAVENOUS at 15:34

## 2019-06-05 RX ADMIN — CARVEDILOL 6.5 MG: 12.5 TABLET, FILM COATED ORAL at 09:03

## 2019-06-05 NOTE — CARDIO/PULMONARY
Cardiac Rehab: Living with Heart Failure Booklet given to Donny Shaikh at a previous visit. Revisited to provide reinforcement of HF education to pt. and his wife. Donny Shaikh is having a rough day as he has a UTI, fever and went back into atrial fib this morning. He is noticeably out of breath. Education deferred but he was glad to know we would continue to follow and visit  again. Yuval Stewart RN      .

## 2019-06-05 NOTE — PROGRESS NOTES
S Cardiology Progress Note                                        NNVAA Date: 5/31/2019    More breathless this am; did not know he had reverted to afib. UO has decreased overnight and he notes not significant flow but no retention  It seems like BP has fallen and UO decreased with addition of flomax to IV diuretics        Assessment/Plan:   # Acute on chronic systolic HF -EF 60%  - improving with diuretics, but creat increasing;still with low BP; will hold bumex, spironolactone and coreg in addition to entresto. - fever not helping  -compression socks  -Bipap for home?      # AF -reverted to afib, cont eliquis and amiodarone. - Consider ablation in future.      # LBBB - Reviewed office ECGs he has progressed recently with LBBB. Will upgrade to BiV ICD/pacer in future-will need to hold off in light of UTI.     # acute on chronic kidney injury-worsening, poss due to low BP; hold entresto, bumex and coreg this am; revisit this afternoon; monitor; may need renal help if trend continues; Will ultrasound kidneys     # fever- cultures pending; UA positive-will need atb; Lakesha Po Xray with no obvious source    Exam:  Blood pressure 95/59, pulse 78, temperature 99.4 °F (37.4 °C), resp. rate 24, weight 100.1 kg (220 lb 10.9 oz), SpO2 92 %.   Lungs clear   Cor irreg S3  Ext with 2+ edema    Labs:  UA +  Cr 2.15  Na 132    CXR: small pl effusions/ Abd neg    Kurt Eaton MD

## 2019-06-05 NOTE — PROGRESS NOTES
1520: SBP in 80s. 1540: Rechecked 20 min later. Systolic still in 37B. Pt just a little lightheaded. Dr. Manish Law paged. 1600: Received telephone orders from Dr. Manish Law for dobutamine drip at 2.5 mcg/kg/min. If HR remains <90 for two hours to increase rate to 5 mcg/kg/min. Also received orders to hold all evening BP medications    1700: BP stable with systolic maintaining in 94V. Some increased work of breathing, not relieved by nasal cannula. Maintaining SpO2 >90%. Pt applied BIPAP. 1815: Pt on room air. Eating dinner. No acute signs of distress. Maintaining SpO2 >90%    1930: Bedside shift change report given to Boris Austin RN (oncoming nurse) by Nilson Padilla RN (offgoing nurse). Report included the following information SBAR, Kardex, Intake/Output, MAR, Recent Results and Cardiac Rhythm A fib. Problem: Heart Failure: Day 5  Goal: Activity/Safety  Outcome: Progressing Towards Goal  Note:   Up with minimal assistance. Unsteady gait. Goal: Medications  Outcome: Progressing Towards Goal  Note:   BP low with systolic in 16E-96G today. Pt asymptomatic. Meds were adjusted today. Pt receiving coreg and amiodarone. Goal: Respiratory  Outcome: Progressing Towards Goal  Note:   Pt occasionally SOB. Wearing 2L NC as needed. Wears BIPAP PRN for increased WOB and at night. Problem: Falls - Risk of  Goal: *Absence of Falls  Description  Document Sherri Escobedo Fall Risk and appropriate interventions in the flowsheet.   Outcome: Progressing Towards Goal  Note:   Fall Risk Interventions:  Mobility Interventions: Communicate number of staff needed for ambulation/transfer, OT consult for ADLs, Patient to call before getting OOB, PT Consult for assist device competence, Strengthening exercises (ROM-active/passive), Utilize walker, cane, or other assistive device, PT Consult for mobility concerns         Medication Interventions: Assess postural VS orthostatic hypotension, Evaluate medications/consider consulting pharmacy, Patient to call before getting OOB, Teach patient to arise slowly                   Problem: Urinary Tract Infection - Adult  Goal: *Absence of infection signs and symptoms  Outcome: Progressing Towards Goal  Note:   Monitoring urine output. Pt started on oral antibiotics today. Pt with low grade fever. Ultrasound of kidneys today. Administering tylenol Q4 PRN.

## 2019-06-05 NOTE — PROGRESS NOTES
Hospitalist Progress Note          Td Preston MD  Please call  and page for questions. Call physician on-call through the  7pm-7am    Daily Progress Note: 6/5/2019    Primary care Anat Rodrigues MD    Date of admission: 5/31/2019  7:03 PM    Admission summery and hospital course: This is a 60-year-old man with past medical history significant for chronic systolic congestive heart failure, dyslipidemia, hypertension, atrial fibrillation status post cardioversion, was in his usual state of health until the day of presentation at the emergency room when the patient developed worsening of his shortness of breath.  The inpatient underwent cardioversion early this morning by his cardiologist.      Subjective:   Patient said he got SOB when he moved out of his bed. Assessment/Plan:   Acute-on-chronic systolic congestive heart failure NYHA 2-3   - recent echocardiogram EF of 21%-25%. Pending report here 5/31     - Entresto hold for low BP. cont diuresis on bumax and coreg and O2 as needed. Patient is mouth breather.   - CTA of the chest is negative for pulmonary embolism. - Cardiology following, appreciated Dr Aura Sorto input. - cont coreg, bumax and hold aldactone for now.      2Atrial fibrillation, status post cardioversion.    Rate controlled on amiodarone, continue Eliquis for anticoagulation.     Hypertension. Hold On entresto      Acute kidney injury.  Most likely as a result of the diuretic therapy.    Monitor repeat labs slightly worsen. Renal US order has been noted. May need nephrology input if continues to deteriorate.      See orders for other plans. VTE prophylaxis: On Eliquis. Code status: Full  Discussed plan of care with Patient/Family and Nurse. Pre-admission lived at home. Discharge planning: pending.         Review of Systems:     Review of Systems:  Symptom  Y/N  Comments   Symptom  Y/N  Comments    Fever/Chills   n   Chest Pain n    Poor Appetite  n    Edema   y    Cough  n   Abdominal Pain   n    Sputum  n   Joint Pain  n    SOB/DOUGLAS  y   Pruritis/Rash      Nausea/vomit  n   Tolerating PT/OT      Diarrhea     Tolerating Diet      Constipation     Other      Could not obtain due to:         Objective:   Physical Exam:     Visit Vitals  BP 95/59   Pulse 78   Temp 99.4 °F (37.4 °C)   Resp 24   Wt 100.1 kg (220 lb 10.9 oz)   SpO2 92%   BMI 28.33 kg/m²    O2 Flow Rate (L/min): 2 l/min O2 Device: BIPAP    Temp (24hrs), Av.3 °F (37.4 °C), Min:98.9 °F (37.2 °C), Max:100.3 °F (37.9 °C)    No intake/output data recorded.  1901 -  0700  In: 26 [P.O.:520]  Out: 652 [Urine:652]      General:  Alert, cooperative, no distress, appears stated age. Lungs:   Clear to auscultation bilaterally. Heart:  Regular rate and rhythm, S1, S2 normal, no murmur. Abdomen:   Soft, non-tender. Bowel sounds normal.    Extremities: Extremities normal, atraumatic, no cyanosis. Edema at LEs. Skin: Skin color, texture, turgor normal. No rashes or lesions   Neurologic: CNII-XII intact. Data Review:       Recent Days:  Recent Labs     19  0339 19  0409 19  0351   WBC 8.5 8.0 5.3   HGB 11.0* 11.6* 11.7*   HCT 34.4* 36.2* 35.9*   * 135* 138*     Recent Labs     19  0339 19  0409 19  0351   * 134* 137   K 4.3 4.2 3.9   CL 99 100 103   CO2 26 26 28   * 107* 104*   BUN 29* 22* 21*   CREA 2.15* 1.94* 1.59*   CA 8.3* 8.5 8.6   MG  --  2.1  --    PHOS  --  2.4*  --    ALB  --  2.9*  --    SGOT  --  98*  --    ALT  --  51  --      No results for input(s): PH, PCO2, PO2, HCO3, FIO2 in the last 72 hours.     24 Hour Results:  Recent Results (from the past 24 hour(s))   CBC WITH AUTOMATED DIFF    Collection Time: 19  3:39 AM   Result Value Ref Range    WBC 8.5 4.1 - 11.1 K/uL    RBC 3.61 (L) 4.10 - 5.70 M/uL    HGB 11.0 (L) 12.1 - 17.0 g/dL    HCT 34.4 (L) 36.6 - 50.3 %    MCV 95.3 80.0 - 99.0 FL MCH 30.5 26.0 - 34.0 PG    MCHC 32.0 30.0 - 36.5 g/dL    RDW 14.3 11.5 - 14.5 %    PLATELET 024 (L) 035 - 400 K/uL    MPV 9.6 8.9 - 12.9 FL    NRBC 0.0 0  WBC    ABSOLUTE NRBC 0.00 0.00 - 0.01 K/uL    NEUTROPHILS 86 (H) 32 - 75 %    LYMPHOCYTES 5 (L) 12 - 49 %    MONOCYTES 8 5 - 13 %    EOSINOPHILS 1 0 - 7 %    BASOPHILS 0 0 - 1 %    IMMATURE GRANULOCYTES 0 %    ABS. NEUTROPHILS 7.3 1.8 - 8.0 K/UL    ABS. LYMPHOCYTES 0.4 (L) 0.8 - 3.5 K/UL    ABS. MONOCYTES 0.7 0.0 - 1.0 K/UL    ABS. EOSINOPHILS 0.1 0.0 - 0.4 K/UL    ABS. BASOPHILS 0.0 0.0 - 0.1 K/UL    ABS. IMM.  GRANS. 0.0 K/UL    DF MANUAL      RBC COMMENTS ANISOCYTOSIS  1+        RBC COMMENTS OVALOCYTES  PRESENT       METABOLIC PANEL, BASIC    Collection Time: 06/05/19  3:39 AM   Result Value Ref Range    Sodium 132 (L) 136 - 145 mmol/L    Potassium 4.3 3.5 - 5.1 mmol/L    Chloride 99 97 - 108 mmol/L    CO2 26 21 - 32 mmol/L    Anion gap 7 5 - 15 mmol/L    Glucose 111 (H) 65 - 100 mg/dL    BUN 29 (H) 6 - 20 MG/DL    Creatinine 2.15 (H) 0.70 - 1.30 MG/DL    BUN/Creatinine ratio 13 12 - 20      GFR est AA 37 (L) >60 ml/min/1.73m2    GFR est non-AA 31 (L) >60 ml/min/1.73m2    Calcium 8.3 (L) 8.5 - 10.1 MG/DL       Problem List:  Problem List as of 6/5/2019 Date Reviewed: 6/4/2019          Codes Class Noted - Resolved    * (Principal) Acute on chronic systolic CHF (congestive heart failure) (HCC) ICD-10-CM: H58.91  ICD-9-CM: 428.23, 428.0  5/31/2019 - Present        Paroxysmal atrial fibrillation (HCC) ICD-10-CM: I48.0  ICD-9-CM: 427.31  4/2/2019 - Present              Medications reviewed  Current Facility-Administered Medications   Medication Dose Route Frequency    amoxicillin-clavulanate (AUGMENTIN) 500-125 mg per tablet 1 Tab  1 Tab Oral Q12H    amiodarone (CORDARONE) tablet 200 mg  200 mg Oral DAILY    apixaban (ELIQUIS) tablet 5 mg  5 mg Oral Q12H    aspirin delayed-release tablet 81 mg  81 mg Oral DAILY    bumetanide (BUMEX) injection 1 mg  1 mg IntraVENous BID    carvedilol (COREG) tablet 12.5 mg  12.5 mg Oral BID WITH MEALS    sodium chloride (NS) flush 5-40 mL  5-40 mL IntraVENous Q8H    sodium chloride (NS) flush 5-40 mL  5-40 mL IntraVENous PRN    ondansetron (ZOFRAN) injection 4 mg  4 mg IntraVENous Q4H PRN    bisacodyl (DULCOLAX) tablet 5 mg  5 mg Oral DAILY PRN    acetaminophen (TYLENOL) tablet 650 mg  650 mg Oral Q4H PRN    morphine injection 2 mg  2 mg IntraVENous Q4H PRN       Care Plan discussed with: Patient/Family and Nurse    Total time spent with patient: 30 minutes.     Claudell Ropes, MD

## 2019-06-05 NOTE — PROGRESS NOTES
Problem: Heart Failure: Day 4  Goal: Activity/Safety  Outcome: Progressing Towards Goal  Note:   Ambulating without difficulty  Goal: *Hemodynamically stable  Outcome: Progressing Towards Goal  Note:   Blood pressures stable at present time, however patient did run low BPs, earlier during the day, resulting in some of his meds being held - see MAR  Goal: *Optimal pain control at patient's stated goal  Outcome: Progressing Towards Goal     Problem: Falls - Risk of  Goal: *Absence of Falls  Description  Document Shon Mejai Fall Risk and appropriate interventions in the flowsheet.   Outcome: Progressing Towards Goal  Note:   Fall Risk Interventions:     Medication Interventions: Evaluate medications/consider consulting pharmacy, Teach patient to arise slowly

## 2019-06-06 LAB
ANION GAP SERPL CALC-SCNC: 7 MMOL/L (ref 5–15)
BACTERIA SPEC CULT: ABNORMAL
BASOPHILS # BLD: 0 K/UL (ref 0–0.1)
BASOPHILS NFR BLD: 0 % (ref 0–1)
BUN SERPL-MCNC: 34 MG/DL (ref 6–20)
BUN/CREAT SERPL: 16 (ref 12–20)
CALCIUM SERPL-MCNC: 8.4 MG/DL (ref 8.5–10.1)
CC UR VC: ABNORMAL
CHLORIDE SERPL-SCNC: 98 MMOL/L (ref 97–108)
CO2 SERPL-SCNC: 27 MMOL/L (ref 21–32)
CREAT SERPL-MCNC: 2.15 MG/DL (ref 0.7–1.3)
DIFFERENTIAL METHOD BLD: ABNORMAL
EOSINOPHIL # BLD: 0.1 K/UL (ref 0–0.4)
EOSINOPHIL NFR BLD: 1 % (ref 0–7)
ERYTHROCYTE [DISTWIDTH] IN BLOOD BY AUTOMATED COUNT: 14.2 % (ref 11.5–14.5)
GLUCOSE SERPL-MCNC: 93 MG/DL (ref 65–100)
HCT VFR BLD AUTO: 34.4 % (ref 36.6–50.3)
HGB BLD-MCNC: 11.1 G/DL (ref 12.1–17)
IMM GRANULOCYTES # BLD AUTO: 0 K/UL
IMM GRANULOCYTES NFR BLD AUTO: 0 %
LYMPHOCYTES # BLD: 0.5 K/UL (ref 0.8–3.5)
LYMPHOCYTES NFR BLD: 7 % (ref 12–49)
MCH RBC QN AUTO: 30.6 PG (ref 26–34)
MCHC RBC AUTO-ENTMCNC: 32.3 G/DL (ref 30–36.5)
MCV RBC AUTO: 94.8 FL (ref 80–99)
MONOCYTES # BLD: 0.5 K/UL (ref 0–1)
MONOCYTES NFR BLD: 6 % (ref 5–13)
MYELOCYTES NFR BLD MANUAL: 1 %
NEUTS BAND NFR BLD MANUAL: 3 % (ref 0–6)
NEUTS SEG # BLD: 6.6 K/UL (ref 1.8–8)
NEUTS SEG NFR BLD: 82 % (ref 32–75)
NRBC # BLD: 0 K/UL (ref 0–0.01)
NRBC BLD-RTO: 0 PER 100 WBC
PLATELET # BLD AUTO: 138 K/UL (ref 150–400)
PLATELET COMMENTS,PCOM: ABNORMAL
PMV BLD AUTO: 9.9 FL (ref 8.9–12.9)
POTASSIUM SERPL-SCNC: 4.1 MMOL/L (ref 3.5–5.1)
RBC # BLD AUTO: 3.63 M/UL (ref 4.1–5.7)
RBC MORPH BLD: ABNORMAL
SERVICE CMNT-IMP: ABNORMAL
SODIUM SERPL-SCNC: 132 MMOL/L (ref 136–145)
WBC # BLD AUTO: 7.8 K/UL (ref 4.1–11.1)

## 2019-06-06 PROCEDURE — 85025 COMPLETE CBC W/AUTO DIFF WBC: CPT

## 2019-06-06 PROCEDURE — 65660000001 HC RM ICU INTERMED STEPDOWN

## 2019-06-06 PROCEDURE — 74011000250 HC RX REV CODE- 250: Performed by: INTERNAL MEDICINE

## 2019-06-06 PROCEDURE — 74011000258 HC RX REV CODE- 258: Performed by: INTERNAL MEDICINE

## 2019-06-06 PROCEDURE — 74011250636 HC RX REV CODE- 250/636: Performed by: INTERNAL MEDICINE

## 2019-06-06 PROCEDURE — 36415 COLL VENOUS BLD VENIPUNCTURE: CPT

## 2019-06-06 PROCEDURE — 94660 CPAP INITIATION&MGMT: CPT

## 2019-06-06 PROCEDURE — 74011250637 HC RX REV CODE- 250/637: Performed by: INTERNAL MEDICINE

## 2019-06-06 PROCEDURE — 80048 BASIC METABOLIC PNL TOTAL CA: CPT

## 2019-06-06 RX ADMIN — Medication 10 ML: at 14:16

## 2019-06-06 RX ADMIN — CARVEDILOL 12.5 MG: 12.5 TABLET, FILM COATED ORAL at 08:51

## 2019-06-06 RX ADMIN — APIXABAN 5 MG: 5 TABLET, FILM COATED ORAL at 21:15

## 2019-06-06 RX ADMIN — BUMETANIDE 1 MG: 0.25 INJECTION INTRAMUSCULAR; INTRAVENOUS at 16:13

## 2019-06-06 RX ADMIN — APIXABAN 5 MG: 5 TABLET, FILM COATED ORAL at 08:51

## 2019-06-06 RX ADMIN — ASPIRIN 81 MG: 81 TABLET ORAL at 08:51

## 2019-06-06 RX ADMIN — CEFTRIAXONE 1 G: 1 INJECTION, POWDER, FOR SOLUTION INTRAMUSCULAR; INTRAVENOUS at 16:11

## 2019-06-06 RX ADMIN — DOBUTAMINE IN DEXTROSE 5 MCG/KG/MIN: 200 INJECTION, SOLUTION INTRAVENOUS at 09:58

## 2019-06-06 RX ADMIN — CARVEDILOL 12.5 MG: 12.5 TABLET, FILM COATED ORAL at 18:25

## 2019-06-06 RX ADMIN — Medication 10 ML: at 21:15

## 2019-06-06 RX ADMIN — AMIODARONE HYDROCHLORIDE 200 MG: 200 TABLET ORAL at 08:51

## 2019-06-06 NOTE — PROGRESS NOTES
Problem: Heart Failure: Discharge Outcomes Goal: *Describes importance of continuing daily weights and changes to report to physician Outcome: Progressing Towards Goal 
  
Problem: Falls - Risk of 
Goal: *Absence of Falls Description Document Ross Garcia Fall Risk and appropriate interventions in the flowsheet. Outcome: Progressing Towards Goal 
Note:  
Fall Risk Interventions: 
Mobility Interventions: Communicate number of staff needed for ambulation/transfer, Patient to call before getting OOB Medication Interventions: Evaluate medications/consider consulting pharmacy, Patient to call before getting OOB, Teach patient to arise slowly Problem: Heart Failure: Day 4 Goal: *Hemodynamically stable Outcome: Progressing Towards Goal 
Started on Dobutamine yesterday for continued low blood pressures. BPs stable since Dobutamine started.

## 2019-06-06 NOTE — PROGRESS NOTES
Spiritual Care Assessment/Progress Note [address] 
 
 
NAME: Aziza Andrade Desmond Toro Pager: [phone] AGE: 76 y.o. SEX: male Orthodox Affiliation:   
Language:   
 
6/6/2019     Total Time (in minutes): 26 Spiritual Assessment begun in [facility] 4 IMCU 2 through conversation with: 
  
    [x]Patient        [] Family    [] Friend(s) Reason for Consult: Advance medical directive consult Spiritual beliefs: (Please include comment if needed) 
   [] Identifies with a jessie tradition:     
   [] Supported by a jessie community:        
   [] Claims no spiritual orientation:       
   [] Seeking spiritual identity:            
   [x] Adheres to an individual form of spirituality:       
   [] Not able to assess:                   
 
    
Identified resources for coping:  
   [] Prayer                           
   [] Music                  [] Guided Imagery [x] Family/friends                 [] Pet visits [] Devotional reading                         [] Unknown 
   [] Other:                                          
 
 
Interventions offered during this visit: (See comments for more details) Patient Interventions: Advance medical directive completed, Initial/Spiritual assessment, patient floor, Initial visit, Affirmation of jessie Plan of Care: 
 
 [x] Support spiritual and/or cultural needs  
 [] Support AMD and/or advance care planning process    
 [] Support grieving process 
 [] Coordinate Rites and/or Rituals  
 [] Coordination with community clergy [] No spiritual needs identified at this time 
 [] Detailed Plan of Care below (See Comments)  [] Make referral to Music Therapy 
[] Make referral to Pet Therapy    
[] Make referral to Addiction services 
[] Make referral to [facility] 
[] Make referral to Spiritual Care Partner 
[] No future visits requested       
[x] Follow up visits as needed Comments:  responded to page from  to visit with patient in [facility] about an AMD.  visited with patient who was sitting in the chair in the room.  went over the AMD and answered any questions that the patient had. Patient will discuss with his wife when she visits later this afternoon or evening.  advised patient to page chaplains if and when he was ready to fill out the AMD. 
 
 visited with patient about his medical condition and what brought him into the hospital. Patient talked about his family as his support group which is his family.  provided pastoral; care and support to the patient. Spiritual Care will follow up as needed. Desmond Boyce Pager: [phone]

## 2019-06-06 NOTE — ACP (ADVANCE CARE PLANNING)
responded to page from  to visit with patient in [facility] about an AMD.  visited with patient who was sitting in the chair in the room.  went over the AMD and answered any questions that the patient had. Patient will discuss with his wife when she visits later this afternoon or evening.  advised patient to page chaplains if and when he was ready to fill out the AMD. 
 
Desmond Boyce Pager: [phone]

## 2019-06-06 NOTE — PROGRESS NOTES
S Cardiology Progress Note                                        LNIWY Date: 5/31/2019 
  
 
Pt reports that he is feeling a little better; still used bipap at night to sleep; walked less yesterday due to shortness of breath  
  
Assessment/Plan: # Acute on chronic systolic HF -EF 66% 
- improved initially  with diuretics, but creat increased and BP dropped; now improved with dobutamine; will restart meds as able-coreg first 
-compression socks -Bipap for home? will order overnight oximetry to eval 
  
# AF -reverted to afib, cont eliquis and amiodarone; in aflutter today 
- Consider ablation in future.  
  
# LBBB - he has progressed recently with LBBB. Will upgrade to BiV ICD/pacer in future-will need to hold off in light of UTI. 
  
# acute on chronic kidney injury-renal US normal; stabilized today with holding most of his meds and starting dobutamine. UO improving. Will add back coreg, then bumex, then entresto in that order as BP allows.  
  
# fever- urine culture positive with gram-rods; on rocephin Exam: 
Blood pressure 106/62, pulse 89, temperature 97.8 °F (36.6 °C), resp. rate 20, weight 100.2 kg (220 lb 14.4 oz), SpO2 100 %. No change in exam 
 
Labs: 
Cr 2.15 Na 132 
 K 4.1 Halle Vu MD

## 2019-06-06 NOTE — PROGRESS NOTES
1930: Bedside shift change report given to Phuong Friday, RN (oncoming nurse) by [address], RN (offgoing nurse). Report included the following information SBAR, Kardex, Intake/Output, MAR, Recent Results and Cardiac Rhythm A fib. Problem: Heart Failure: Day 5 Goal: Activity/Safety Outcome: Progressing Towards Goal 
Note:  
Pt up with minimal assistance. Goal: Medications Outcome: Progressing Towards Goal 
Note:  
Pt started on dobutamine drip last night. Maintaining SBP in low 90s-100s. Also receiving coreg and amiodarone. Holding bumex right now due to SBP <100. Will continue to monitor. Goal: Respiratory Outcome: Progressing Towards Goal 
Note:  
Pt on room air. Wearing 2L as needed and BIPAP as needed and at night. Goal: Treatments/Interventions/Procedures Outcome: Progressing Towards Goal 
Note:  
Pt to have overnight oximetry test tonight. Problem: Falls - Risk of 
Goal: *Absence of Falls Description Document Ariel Fontanez Fall Risk and appropriate interventions in the flowsheet. Outcome: Progressing Towards Goal 
Note:  
Fall Risk Interventions: 
Mobility Interventions: Communicate number of staff needed for ambulation/transfer, OT consult for ADLs, Patient to call before getting OOB, PT Consult for mobility concerns, PT Consult for assist device competence, Strengthening exercises (ROM-active/passive), Utilize walker, cane, or other assistive device Medication Interventions: Assess postural VS orthostatic hypotension, Evaluate medications/consider consulting pharmacy, Patient to call before getting OOB, Teach patient to arise slowly Problem: Urinary Tract Infection - Adult Goal: *Absence of infection signs and symptoms Outcome: Progressing Towards Goal 
Note:  
Pt receiving IV antibiotics. Monitoring intake and output.

## 2019-06-06 NOTE — PROGRESS NOTES
Hospitalist Progress Note Margarita Hodges MD 
Please call  and page for questions. Call physician on-call through the  7pm-7am 
 
Daily Progress Note: 6/6/2019 Primary care Rinku Stinson MD 
 
Date of admission: 5/31/2019  7:03 PM 
 
Admission summery and hospital course: 
59-year-old man with past medical history significant for chronic systolic congestive heart failure, dyslipidemia, hypertension, atrial fibrillation status post cardioversion, was in his usual state of health until the day of presentation at the emergency room when the patient developed worsening of his shortness of breath.  The inpatient underwent cardioversion early this morning by his cardiologist. 
  
Subjective:  
Patient said he is feeling better today. Assessment/Plan:  
Acute-on-chronic systolic congestive heart failure NYHA 2-3 JACQUE on 05/31 with EF of 21%-25%.      
Entresto hold for low BP, creatinine up. Cont diuresis on bumax and coreg and O2 as needed. Patient is mouth breather. CTA of the chest is negative for pulmonary embolism. Appreciated Dr Haven Rosen, Cardiology input.  Cont coreg, bumax and hold aldactone for now. Dubutamine started on 06/05. Night time oxymetry for the necessity of BiPAP.  
  
Atrial fibrillation, status post cardioversion.   
Rate controlled on amiodarone, continue Eliquis for anticoagulation. On Dobutamine.  
  
Hypertension. Hold On entresto. BP is reasonable now.  
  
Acute kidney injury.  Most likely as a result of the diuretic therapy and or entresto.  Renal US on 06/05 was normal. .  
  
See orders for other plans. VTE prophylaxis: On Eliquis. Code status: Full Discussed plan of care with Patient/Family and Nurse. Pre-admission lived at home. Discharge planning: pending Review of Systems:  
 
Review of Systems: 
Symptom  Y/N  Comments   Symptom  Y/N  Comments Fever/Chills  n    Chest Pain  n   
 Poor Appetite   n   Edema  y Cough  y   Abdominal Pain Sputum  n   Joint Pain SOB/DOUGLAS  y   Pruritis/Rash Nausea/vomit  n   Tolerating PT/OT Diarrhea     Tolerating Diet Constipation     Other Could not obtain due to:    
 
 
Objective:  
Physical Exam:  
 
Visit Vitals /62 (BP 1 Location: Right arm, BP Patient Position: At rest) Pulse 89 Temp 97.8 °F (36.6 °C) Resp 20 Wt 100.2 kg (220 lb 14.4 oz) SpO2 100% BMI 28.36 kg/m² O2 Flow Rate (L/min): 2 l/min O2 Device: Room air Temp (24hrs), Av.6 °F (37 °C), Min:97.8 °F (36.6 °C), Max:99.3 °F (37.4 °C) 
  701 -  190 In: 240 [P.O.:240] Out: 250 [Urine:250]    190 -  0700 In: 1240 [P.O.:1240] Out: 2150 [Urine:2150] General:  Alert, cooperative, no distress, appears stated age. Lungs:   Clear to auscultation bilaterally. Heart:  Regular rate and rhythm, S1, S2 normal, no murmur. Abdomen:   Soft, non-tender. Bowel sounds normal.   
Extremities: Extremities normal, atraumatic, no cyanosis. Edema at LEs. Skin: Skin color, texture, turgor normal. No rashes or lesions Neurologic: CNII-XII intact Data Review:  
   
Recent Days: 
Recent Labs  
  19 
03419 
03319 
0409 WBC 7.8 8.5 8.0 HGB 11.1* 11.0* 11.6* HCT 34.4* 34.4* 36.2*  
* 127* 135* Recent Labs  
  19 
03419 
03319 
0409 * 132* 134* K 4.1 4.3 4.2 CL 98 99 100 CO2 27 26 26 GLU 93 111* 107* BUN 34* 29* 22* CREA 2.15* 2.15* 1.94* CA 8.4* 8.3* 8.5 MG  --   --  2.1 PHOS  --   --  2.4* ALB  --   --  2.9*  
SGOT  --   --  98* ALT  --   --  51 No results for input(s): PH, PCO2, PO2, HCO3, FIO2 in the last 72 hours. 24 Hour Results: 
Recent Results (from the past 24 hour(s)) CBC WITH AUTOMATED DIFF Collection Time: 19  3:41 AM  
Result Value Ref Range WBC 7.8 4.1 - 11.1 K/uL  
 RBC 3.63 (L) 4.10 - 5.70 M/uL HGB 11.1 (L) 12.1 - 17.0 g/dL HCT 34.4 (L) 36.6 - 50.3 % MCV 94.8 80.0 - 99.0 FL  
 MCH 30.6 26.0 - 34.0 PG  
 MCHC 32.3 30.0 - 36.5 g/dL  
 RDW 14.2 11.5 - 14.5 % PLATELET 098 (L) 008 - 400 K/uL MPV 9.9 8.9 - 12.9 FL  
 NRBC 0.0 0  WBC ABSOLUTE NRBC 0.00 0.00 - 0.01 K/uL NEUTROPHILS 82 (H) 32 - 75 % BAND NEUTROPHILS 3 0 - 6 % LYMPHOCYTES 7 (L) 12 - 49 % MONOCYTES 6 5 - 13 % EOSINOPHILS 1 0 - 7 % BASOPHILS 0 0 - 1 % MYELOCYTES 1 (H) 0 % IMMATURE GRANULOCYTES 0 %  
 ABS. NEUTROPHILS 6.6 1.8 - 8.0 K/UL  
 ABS. LYMPHOCYTES 0.5 (L) 0.8 - 3.5 K/UL  
 ABS. MONOCYTES 0.5 0.0 - 1.0 K/UL  
 ABS. EOSINOPHILS 0.1 0.0 - 0.4 K/UL  
 ABS. BASOPHILS 0.0 0.0 - 0.1 K/UL  
 ABS. IMM. GRANS. 0.0 K/UL  
 DF MANUAL PLATELET COMMENTS Large Platelets RBC COMMENTS ANISOCYTOSIS 1+ 
    
 RBC COMMENTS MACROCYTOSIS 1+ 
    
 RBC COMMENTS OVALOCYTES PRESENT 
    
METABOLIC PANEL, BASIC Collection Time: 06/06/19  3:41 AM  
Result Value Ref Range Sodium 132 (L) 136 - 145 mmol/L Potassium 4.1 3.5 - 5.1 mmol/L Chloride 98 97 - 108 mmol/L  
 CO2 27 21 - 32 mmol/L Anion gap 7 5 - 15 mmol/L Glucose 93 65 - 100 mg/dL BUN 34 (H) 6 - 20 MG/DL Creatinine 2.15 (H) 0.70 - 1.30 MG/DL  
 BUN/Creatinine ratio 16 12 - 20 GFR est AA 37 (L) >60 ml/min/1.73m2 GFR est non-AA 31 (L) >60 ml/min/1.73m2 Calcium 8.4 (L) 8.5 - 10.1 MG/DL Problem List: 
Problem List as of 6/6/2019 Date Reviewed: 6/4/2019 Codes Class Noted - Resolved * (Principal) Acute on chronic systolic CHF (congestive heart failure) (HCC) ICD-10-CM: T09.24 ICD-9-CM: 428.23, 428.0  5/31/2019 - Present Paroxysmal atrial fibrillation (HCC) ICD-10-CM: I48.0 ICD-9-CM: 427.31  4/2/2019 - Present Medications reviewed Current Facility-Administered Medications Medication Dose Route Frequency  cefTRIAXone (ROCEPHIN) 1 g in 0.9% sodium chloride (MBP/ADV) 50 mL  1 g IntraVENous Q24H  
 DOBUTamine (DOBUTREX) 500 mg/250 mL (2,000 mcg/mL) infusion  2.5-5 mcg/kg/min IntraVENous TITRATE  amiodarone (CORDARONE) tablet 200 mg  200 mg Oral DAILY  apixaban (ELIQUIS) tablet 5 mg  5 mg Oral Q12H  aspirin delayed-release tablet 81 mg  81 mg Oral DAILY  bumetanide (BUMEX) injection 1 mg  1 mg IntraVENous BID  carvedilol (COREG) tablet 12.5 mg  12.5 mg Oral BID WITH MEALS  sodium chloride (NS) flush 5-40 mL  5-40 mL IntraVENous Q8H  
 sodium chloride (NS) flush 5-40 mL  5-40 mL IntraVENous PRN  
 ondansetron (ZOFRAN) injection 4 mg  4 mg IntraVENous Q4H PRN  
 bisacodyl (DULCOLAX) tablet 5 mg  5 mg Oral DAILY PRN  
 acetaminophen (TYLENOL) tablet 650 mg  650 mg Oral Q4H PRN  
 morphine injection 2 mg  2 mg IntraVENous Q4H PRN Care Plan discussed with: Patient/Family and Nurse Total time spent with patient: 30 minutes.  
 
Zane Brannon MD

## 2019-06-06 NOTE — PROGRESS NOTES
Reason for Admission:   CHF 
               
RRAT Score:     16 Do you (patient/family) have any concerns for transition/discharge? Lives with wife Plan for utilizing home health:   Agreeable to [facility] if qualiflies for CHF - lives at [facility], [address]- is independent with ADL's and driving- sent referral to [facility] for [facility] but they do not service paitent's area Current Advanced Directive/Advance Care Plan:  None - paged pastoral care and Likelihood of readmission?   moderate Transition of Care Plan:      Home with possible [facility] and PCP or cardiologist

## 2019-06-06 NOTE — PROGRESS NOTES
A Spiritual Care Partner Volunteer visited patient in Rm 421 on 6/6/2019. Documented by: 
Chaplain Chan MDiv, MS, [facility] 
[phone]

## 2019-06-07 LAB
ANION GAP SERPL CALC-SCNC: 5 MMOL/L (ref 5–15)
BASOPHILS # BLD: 0 K/UL (ref 0–0.1)
BASOPHILS NFR BLD: 0 % (ref 0–1)
BUN SERPL-MCNC: 35 MG/DL (ref 6–20)
BUN/CREAT SERPL: 17 (ref 12–20)
CALCIUM SERPL-MCNC: 8.7 MG/DL (ref 8.5–10.1)
CHLORIDE SERPL-SCNC: 100 MMOL/L (ref 97–108)
CO2 SERPL-SCNC: 29 MMOL/L (ref 21–32)
CREAT SERPL-MCNC: 2.1 MG/DL (ref 0.7–1.3)
DIFFERENTIAL METHOD BLD: ABNORMAL
EOSINOPHIL # BLD: 0 K/UL (ref 0–0.4)
EOSINOPHIL NFR BLD: 0 % (ref 0–7)
ERYTHROCYTE [DISTWIDTH] IN BLOOD BY AUTOMATED COUNT: 14.2 % (ref 11.5–14.5)
GLUCOSE SERPL-MCNC: 101 MG/DL (ref 65–100)
HCT VFR BLD AUTO: 34.3 % (ref 36.6–50.3)
HGB BLD-MCNC: 11.1 G/DL (ref 12.1–17)
IMM GRANULOCYTES # BLD AUTO: 0 K/UL
IMM GRANULOCYTES NFR BLD AUTO: 0 %
LYMPHOCYTES # BLD: 0.4 K/UL (ref 0.8–3.5)
LYMPHOCYTES NFR BLD: 7 % (ref 12–49)
MCH RBC QN AUTO: 30.7 PG (ref 26–34)
MCHC RBC AUTO-ENTMCNC: 32.4 G/DL (ref 30–36.5)
MCV RBC AUTO: 94.8 FL (ref 80–99)
MONOCYTES # BLD: 0.4 K/UL (ref 0–1)
MONOCYTES NFR BLD: 6 % (ref 5–13)
NEUTS BAND NFR BLD MANUAL: 1 % (ref 0–6)
NEUTS SEG # BLD: 5.6 K/UL (ref 1.8–8)
NEUTS SEG NFR BLD: 86 % (ref 32–75)
NRBC # BLD: 0 K/UL (ref 0–0.01)
NRBC BLD-RTO: 0 PER 100 WBC
PLATELET # BLD AUTO: 162 K/UL (ref 150–400)
PMV BLD AUTO: 9.8 FL (ref 8.9–12.9)
POTASSIUM SERPL-SCNC: 4 MMOL/L (ref 3.5–5.1)
RBC # BLD AUTO: 3.62 M/UL (ref 4.1–5.7)
RBC MORPH BLD: ABNORMAL
RBC MORPH BLD: ABNORMAL
SODIUM SERPL-SCNC: 134 MMOL/L (ref 136–145)
WBC # BLD AUTO: 6.4 K/UL (ref 4.1–11.1)

## 2019-06-07 PROCEDURE — 74011250637 HC RX REV CODE- 250/637: Performed by: INTERNAL MEDICINE

## 2019-06-07 PROCEDURE — 94762 N-INVAS EAR/PLS OXIMTRY CONT: CPT

## 2019-06-07 PROCEDURE — 80048 BASIC METABOLIC PNL TOTAL CA: CPT

## 2019-06-07 PROCEDURE — 97161 PT EVAL LOW COMPLEX 20 MIN: CPT

## 2019-06-07 PROCEDURE — 74011250636 HC RX REV CODE- 250/636: Performed by: INTERNAL MEDICINE

## 2019-06-07 PROCEDURE — 74011000250 HC RX REV CODE- 250: Performed by: INTERNAL MEDICINE

## 2019-06-07 PROCEDURE — 97116 GAIT TRAINING THERAPY: CPT

## 2019-06-07 PROCEDURE — 74011000258 HC RX REV CODE- 258: Performed by: INTERNAL MEDICINE

## 2019-06-07 PROCEDURE — 85025 COMPLETE CBC W/AUTO DIFF WBC: CPT

## 2019-06-07 PROCEDURE — 36415 COLL VENOUS BLD VENIPUNCTURE: CPT

## 2019-06-07 PROCEDURE — 65660000001 HC RM ICU INTERMED STEPDOWN

## 2019-06-07 RX ORDER — AMIODARONE HYDROCHLORIDE 200 MG/1
200 TABLET ORAL 2 TIMES DAILY
Status: DISCONTINUED | OUTPATIENT
Start: 2019-06-07 | End: 2019-06-26 | Stop reason: HOSPADM

## 2019-06-07 RX ORDER — DOBUTAMINE HYDROCHLORIDE 200 MG/100ML
2.5 INJECTION INTRAVENOUS CONTINUOUS
Status: DISCONTINUED | OUTPATIENT
Start: 2019-06-07 | End: 2019-06-10

## 2019-06-07 RX ORDER — BUMETANIDE 0.25 MG/ML
1 INJECTION INTRAMUSCULAR; INTRAVENOUS
Status: DISCONTINUED | OUTPATIENT
Start: 2019-06-07 | End: 2019-06-10

## 2019-06-07 RX ADMIN — Medication 10 ML: at 13:32

## 2019-06-07 RX ADMIN — CEFTRIAXONE 1 G: 1 INJECTION, POWDER, FOR SOLUTION INTRAMUSCULAR; INTRAVENOUS at 15:58

## 2019-06-07 RX ADMIN — CARVEDILOL 12.5 MG: 12.5 TABLET, FILM COATED ORAL at 17:45

## 2019-06-07 RX ADMIN — ASPIRIN 81 MG: 81 TABLET ORAL at 08:40

## 2019-06-07 RX ADMIN — CARVEDILOL 12.5 MG: 12.5 TABLET, FILM COATED ORAL at 08:40

## 2019-06-07 RX ADMIN — Medication 10 ML: at 16:03

## 2019-06-07 RX ADMIN — APIXABAN 5 MG: 5 TABLET, FILM COATED ORAL at 21:15

## 2019-06-07 RX ADMIN — APIXABAN 5 MG: 5 TABLET, FILM COATED ORAL at 08:40

## 2019-06-07 RX ADMIN — AMIODARONE HYDROCHLORIDE 200 MG: 200 TABLET ORAL at 17:44

## 2019-06-07 RX ADMIN — DOBUTAMINE IN DEXTROSE 5 MCG/KG/MIN: 200 INJECTION, SOLUTION INTRAVENOUS at 04:54

## 2019-06-07 RX ADMIN — AMIODARONE HYDROCHLORIDE 200 MG: 200 TABLET ORAL at 08:40

## 2019-06-07 RX ADMIN — DOBUTAMINE IN DEXTROSE 5 MCG/KG/MIN: 200 INJECTION, SOLUTION INTRAVENOUS at 21:15

## 2019-06-07 RX ADMIN — Medication 10 ML: at 07:09

## 2019-06-07 RX ADMIN — BUMETANIDE 1 MG: 0.25 INJECTION INTRAMUSCULAR; INTRAVENOUS at 11:29

## 2019-06-07 RX ADMIN — Medication 10 ML: at 21:17

## 2019-06-07 NOTE — PROGRESS NOTES
Reviewed in IDRs - patient is on a dobutamine drip - having low grade fevers - patient will likely remain in the hospital over the weekend- will await qualifying sats to determine need for home O2 within 48 hours of discharge-if o will need home O2 orders and Medicare qualifying documentation (Chronic dx and condition has stable and  as well from MD patient

## 2019-06-07 NOTE — PROGRESS NOTES
NUTRITION Pt seen for:    
[]           Supplements  []             PO intake check  
[]           Food Allergies  []             Food Preferences/tolerances [x]           Rescreen   []             Education []           Diet order clarification []             Other RECOMMENDATIONS:  
Continue diet as ordered SUBJECTIVE/OBJECTIVE:  
Chart reviewed, discussed with RN. Pt's intake % of most meals without issues. MST negative on admission and no risk for malnutrition noted at this time. Will rescreen as indicated. Diet: Cardiac Regular, 2 gm Sodium Intake: [x]           Good     []           Fair      []           Poor Patient Vitals for the past 100 hrs: 
 % Diet Eaten 06/07/19 0820 100 % 06/06/19 1210 75 % 06/06/19 0810 100 % 06/05/19 1210 50 % 06/05/19 0810 100 % 06/04/19 1600 10 % 06/04/19 0800 75 % Weight Changes:  
Last 3 Recorded Weights in this Encounter 06/05/19 5422 06/06/19 0631 06/07/19 7489 Weight: 100.1 kg (220 lb 10.9 oz) 100.2 kg (220 lb 14.4 oz) 99.1 kg (218 lb 6.4 oz) Wt Readings from Last 10 Encounters:  
06/07/19 99.1 kg (218 lb 6.4 oz) 05/01/19 96.2 kg (212 lb) 04/02/19 97.5 kg (215 lb) Nutrition Problems Identified: 
[x]      None PLAN:  
[]           Obtained/adjusted food preferences/tolerances and/or snacks options []           Dislikes supplements will try a substitution []           Modify diet for food allergies []           Adjust texture due to difficulty chewing [x]    Continue current diet 
[]           Educated patient 
[]           Add Supplements Puneet Ruff, [address] [facility]

## 2019-06-07 NOTE — PROGRESS NOTES
Hospitalist Progress Note Fritz Severance, MD 
Please call  and page for questions. Call physician on-call through the  7pm-7am 
 
Daily Progress Note: 6/7/2019 Primary care Patrick Moctezuma MD 
 
Date of admission: 5/31/2019  7:03 PM 
 
Admission summery and hospital course: 
68-year-old man with past medical history significant for chronic systolic congestive heart failure, dyslipidemia, hypertension, atrial fibrillation status post cardioversion, was in his usual state of health until the day of presentation at the emergency room when the patient developed worsening of his shortness of breath.  The inpatient underwent cardioversion early this morning by his cardiologist. 
 
 
Subjective:  
Patient said he is feeling better today. Assessment/Plan:  
 Acute-on-chronic systolic congestive heart failure NYHA 2-3 JACQUE on 05/31 with EF of 21%-25%.      
Entresto hold for low BP, creatinine up. Cont diuresis on bumax and coreg and O2 as needed. Patient is mouth breather.  
CTA of the chest is negative for pulmonary embolism. Appreciated Dr Jacques Warner, Cardiology input.  Cont coreg, bumax and hold aldactone for now. Dubutamine started on 06/05. Night time oxymetry for the necessity of BiPAP. Patient needs 6 minutes walk prior to discharge.  
  
Atrial fibrillation, status post cardioversion.   
Rate controlled on amiodarone, continue Eliquis for anticoagulation. On Dobutamine.  
  
Hypertension. Hold On entresto. BP is reasonable now.  
  
Acute kidney injury.  Most likely as a result of the diuretic therapy and or entresto.  Renal US on 06/05 was normal.  
 
UTI: Patient is on Rocephin. Tmax as 100 on 06/06 PM. Continue to monitor with Rocephin.  
  
See orders for other plans. VTE prophylaxis: On Eliquis.  
Code status: Full Discussed plan of care with Patient/Family and Nurse. Patient wife is at the bedside. Pre-admission lived at home. Discharge planning: pending Review of Systems:  
 
Review of Systems: 
Symptom  Y/N  Comments   Symptom  Y/N  Comments Fever/Chills  n    Chest Pain  n   
Poor Appetite   n   Edema   n   
Cough  y   Abdominal Pain   n   
Sputum  y   Joint Pain SOB/DOUGLAS  y   Pruritis/Rash Nausea/vomit     Tolerating PT/OT Diarrhea     Tolerating Diet Constipation     Other Could not obtain due to:    
 
 
Objective:  
Physical Exam:  
 
Visit Vitals /64 (BP 1 Location: Right arm, BP Patient Position: At rest;Supine) Pulse 79 Temp 98.7 °F (37.1 °C) Resp 16 Wt 99.1 kg (218 lb 6.4 oz) SpO2 96% BMI 28.04 kg/m² O2 Flow Rate (L/min): 2 l/min O2 Device: Room air Temp (24hrs), Av.9 °F (37.2 °C), Min:98.4 °F (36.9 °C), Max:100 °F (37.8 °C) 
  701 -  190 In: 240 [P.O.:240] Out: 525 [Urine:525]   1901 -  07 In: 1813.5 [P.O.:1040; I.V.:773.5] Out: 3040 [IJGZV:5391] General:  Alert, cooperative, no distress, appears stated age. Lungs:   Clear to auscultation bilaterally. Heart:  Regular rate and rhythm, S1, S2 normal, no murmur.   
Abdomen:   Soft, non-tender. Bowel sounds normal.   
Extremities: Extremities normal, atraumatic, no cyanosis. Edema at LEs. Skin: Skin color, texture, turgor normal. No rashes or lesions Neurologic: CNII-XII intact  
  
Data Review:  
   
Recent Days: 
Recent Labs  
  19 
0112 19 
0341 19 
6365 WBC 6.4 7.8 8.5 HGB 11.1* 11.1* 11.0*  
HCT 34.3* 34.4* 34.4*  
 138* 127* Recent Labs  
  19 
0112 19 
0341 19 
1749 * 132* 132* K 4.0 4.1 4.3  98 99 CO2 29 27 26 * 93 111* BUN 35* 34* 29* CREA 2.10* 2.15* 2.15* CA 8.7 8.4* 8.3* No results for input(s): PH, PCO2, PO2, HCO3, FIO2 in the last 72 hours. 24 Hour Results: 
Recent Results (from the past 24 hour(s)) CBC WITH AUTOMATED DIFF  
 Collection Time: 06/07/19  1:12 AM  
Result Value Ref Range WBC 6.4 4.1 - 11.1 K/uL  
 RBC 3.62 (L) 4.10 - 5.70 M/uL  
 HGB 11.1 (L) 12.1 - 17.0 g/dL HCT 34.3 (L) 36.6 - 50.3 % MCV 94.8 80.0 - 99.0 FL  
 MCH 30.7 26.0 - 34.0 PG  
 MCHC 32.4 30.0 - 36.5 g/dL  
 RDW 14.2 11.5 - 14.5 % PLATELET 837 577 - 186 K/uL MPV 9.8 8.9 - 12.9 FL  
 NRBC 0.0 0  WBC ABSOLUTE NRBC 0.00 0.00 - 0.01 K/uL NEUTROPHILS 86 (H) 32 - 75 % BAND NEUTROPHILS 1 0 - 6 % LYMPHOCYTES 7 (L) 12 - 49 % MONOCYTES 6 5 - 13 % EOSINOPHILS 0 0 - 7 % BASOPHILS 0 0 - 1 % IMMATURE GRANULOCYTES 0 %  
 ABS. NEUTROPHILS 5.6 1.8 - 8.0 K/UL  
 ABS. LYMPHOCYTES 0.4 (L) 0.8 - 3.5 K/UL  
 ABS. MONOCYTES 0.4 0.0 - 1.0 K/UL  
 ABS. EOSINOPHILS 0.0 0.0 - 0.4 K/UL  
 ABS. BASOPHILS 0.0 0.0 - 0.1 K/UL  
 ABS. IMM. GRANS. 0.0 K/UL  
 DF MANUAL    
 RBC COMMENTS OVALOCYTES PRESENT 
    
 RBC COMMENTS ANISOCYTOSIS 
1+ METABOLIC PANEL, BASIC Collection Time: 06/07/19  1:12 AM  
Result Value Ref Range Sodium 134 (L) 136 - 145 mmol/L Potassium 4.0 3.5 - 5.1 mmol/L Chloride 100 97 - 108 mmol/L  
 CO2 29 21 - 32 mmol/L Anion gap 5 5 - 15 mmol/L Glucose 101 (H) 65 - 100 mg/dL BUN 35 (H) 6 - 20 MG/DL Creatinine 2.10 (H) 0.70 - 1.30 MG/DL  
 BUN/Creatinine ratio 17 12 - 20 GFR est AA 38 (L) >60 ml/min/1.73m2 GFR est non-AA 32 (L) >60 ml/min/1.73m2 Calcium 8.7 8.5 - 10.1 MG/DL Problem List: 
Problem List as of 6/7/2019 Date Reviewed: 6/4/2019 Codes Class Noted - Resolved * (Principal) Acute on chronic systolic CHF (congestive heart failure) (HCC) ICD-10-CM: M29.40 ICD-9-CM: 428.23, 428.0  5/31/2019 - Present Paroxysmal atrial fibrillation (HCC) ICD-10-CM: I48.0 ICD-9-CM: 427.31  4/2/2019 - Present Medications reviewed Current Facility-Administered Medications Medication Dose Route Frequency  bumetanide (BUMEX) injection 1 mg  1 mg IntraVENous DAILY WITH LUNCH  
 amiodarone (CORDARONE) tablet 200 mg  200 mg Oral BID  DOBUTamine (DOBUTREX) 500 mg/250 mL (2,000 mcg/mL) infusion  5 mcg/kg/min IntraVENous CONTINUOUS  
 cefTRIAXone (ROCEPHIN) 1 g in 0.9% sodium chloride (MBP/ADV) 50 mL  1 g IntraVENous Q24H  
 apixaban (ELIQUIS) tablet 5 mg  5 mg Oral Q12H  aspirin delayed-release tablet 81 mg  81 mg Oral DAILY  carvedilol (COREG) tablet 12.5 mg  12.5 mg Oral BID WITH MEALS  sodium chloride (NS) flush 5-40 mL  5-40 mL IntraVENous Q8H  
 sodium chloride (NS) flush 5-40 mL  5-40 mL IntraVENous PRN  
 ondansetron (ZOFRAN) injection 4 mg  4 mg IntraVENous Q4H PRN  
 bisacodyl (DULCOLAX) tablet 5 mg  5 mg Oral DAILY PRN  
 acetaminophen (TYLENOL) tablet 650 mg  650 mg Oral Q4H PRN  
 morphine injection 2 mg  2 mg IntraVENous Q4H PRN Care Plan discussed with: Patient/Family and Nurse Total time spent with patient: 30 minutes.  
 
Av Blanco MD

## 2019-06-07 NOTE — PROGRESS NOTES
S Cardiology Progress Note                                        IEWRN Date: 5/31/2019 
  
  
Pt reports that he is feeling a little better;  couldn't tolerate oximetry test due to PND Assessment/Plan: # Acute on chronic systolic HF -EF 16% 
- improved initially  with diuretics, but creat increased and BP dropped; now improved with dobutamine; will continue lower dose coreg and bumex and continue dobutamine until his creat back to baseline and improved diuresis; spironolactone might be added tomorrow 
-compression socks -Bipap for home? no desat documented last pm but did have PND and asked for O2; will order 6 min walk test 
  
# AF -reverted to afib, cont eliquis and amiodarone; in aflutter today; may cardiovert again before discharge 
- Consider ablation in future.  
  
# LBBB - he has progressed recently with LBBB. Will upgrade to BiV ICD/pacer in future-will need to hold off in light of UTI. 
  
# acute on chronic kidney injury-renal US normal; now trending down and UO improving. Back on coreg and lower dose bumex;  entresto still on hold 
  
# fever- urine culture positive with enterococcae sensitive to rocephin; still low grade fever. Exam: 
Blood pressure 111/59, pulse 80, temperature 98.4 °F (36.9 °C), resp. rate 20, weight 99.1 kg (218 lb 6.4 oz), SpO2 98 %. UO net neg 636 Lungs clear Cor irreg with S3 
Edema 1+ Labs: 
Cr 2.1 K 4.0 Daxa Pacheco MD

## 2019-06-07 NOTE — PROGRESS NOTES
2030: Bedside shift change report given to Lizbeth Power RN (oncoming nurse) by Esperanza Angulo RN (offgoing nurse). Report included the following information SBAR, Kardex, Intake/Output, MAR, Recent Results, Med Rec Status and Cardiac Rhythm AFib/Flutter. Patient ambulating around unit BID. Patient continuing on dobutamine at 5mcg/kg/hr. Mild dyspnea on exertion noted and +2 non-pitting edema noted in lower extremities. Problem: Heart Failure: Day 5 Goal: Activity/Safety Outcome: Progressing Towards Goal 
Goal: Medications Outcome: Progressing Towards Goal 
Goal: Respiratory Outcome: Progressing Towards Goal

## 2019-06-07 NOTE — PROGRESS NOTES
0105 Pt called out and reported that he had not slept in 3 hours due to feeling short of breath and having to sit up to breathe- O2 sat 94% RA. Pt requested to have NC back on in order to rest comfortably. Put on 2L NC - O2 sat 99.  
 
2118 Put pt on RA for overnight pulse ox study. Pt currently 96% O2.

## 2019-06-07 NOTE — PROGRESS NOTES
1930: Bedside shift change report given to Jacoby HernandezRN (oncoming nurse) by Dk Oakes RN (offgoing nurse). Report included the following information SBAR, Kardex, Intake/Output, MAR, Recent Results and Cardiac Rhythm A flutter. Problem: Heart Failure: Day 5 Goal: Activity/Safety Outcome: Progressing Towards Goal 
Note: PT consulted to do 6 minute walk test with patient today. Goal: Medications Outcome: Progressing Towards Goal 
Note:  
Pt on coreg, amiodarone, bumex, and aspirin. On dobutamine drip. SBP maintaining in 90s-100s. Will continue to monitor. Goal: Respiratory Outcome: Progressing Towards Goal 
Note:  
Pt on room air. Wearing 2L NC as needed. Still feels SOB at night, but maintaining SpO2 >90%. Will continue to monitor. Problem: Urinary Tract Infection - Adult Goal: *Absence of infection signs and symptoms Outcome: Progressing Towards Goal 
Note:  
Continuing to have LG fever. On IV antibiotics. Monitoring I/Os. WBC normal. Will continue to monitor.

## 2019-06-07 NOTE — PROGRESS NOTES
0745 Bedside and Verbal shift change report given to Maria Isabel Brown RN (oncoming nurse) by [place], RN (offgoing nurse). Report included the following information SBAR, Kardex, Intake/Output, MAR and Recent Results. Problem: Heart Failure: Discharge Outcomes Goal: *Demonstrates ability to perform prescribed activity without shortness of breath or discomfort Outcome: Progressing Towards Goal 
Note:  
Pt has some dyspnea with exertion. Pt tried to sleep without O2 last night and made about 4 hours before he requested it to be back on due to not being able to sleep, rest, or lie down for periods of time without feeling dsypneic.

## 2019-06-07 NOTE — PROGRESS NOTES
PHYSICAL THERAPY EVALUATION Patient: Montserrat Bryant (77 y.o. male) Date: 6/7/2019 Primary Diagnosis: Acute on chronic systolic CHF (congestive heart failure) ([facility] [I50.23] Precautions: fall ASSESSMENT : 
Based on the objective data described below, the patient presents with mildly unsteady gait demonstrating path deviations and decreased foot clearance that increased as patient fatigued at which time he was reaching for railing in hallway to steady himself. 6 minute walk test completed( see below). Will try a cane next RX to see if this improves stability. Patient will benefit from skilled intervention to address the above impairments. Patient?s rehabilitation potential is considered to be Good Factors which may influence rehabilitation potential include:  
? None noted ? Mental ability/status ? Medical condition ? Home/family situation and support systems ? Safety awareness 
? Pain tolerance/management 
? Other: PLAN : 
Recommendations and Planned Interventions: 
?           Bed Mobility Training             ? Neuromuscular Re-Education ? Transfer Training                   ? Orthotic/Prosthetic Training 
? Gait Training                         ? Modalities ? Therapeutic Exercises           ? Edema Management/Control ? Therapeutic Activities            ? Patient and Family Training/Education ? Other (comment): Frequency/Duration: Patient will be followed by physical therapy  5 times a week to address goals. Discharge Recommendations: Home Health Further Equipment Recommendations for Discharge: will continue to assess SUBJECTIVE:  
Patient stated ? I can tell I have gotten weaker. ? OBJECTIVE DATA SUMMARY:  
HISTORY:   
Past Medical History:  
Diagnosis Date Degenerative disc disease, lumbar Heart failure ([facility] High cholesterol Hypertension Paroxysmal atrial fibrillation ([facility] 4/2/2019 Spinal stenosis Past Surgical History:  
Procedure Laterality Date HX APPENDECTOMY HX CORONARY ARTERY BYPASS GRAFT    
 triple HX HERNIA REPAIR    
 HX IMPLANTABLE CARDIOVERTER DEFIBRILLATOR Prior Level of Function/Home Situation: Independent, uses walking stick prn Personal factors and/or comorbidities impacting plan of care: CHF Home Situation Home Environment: Private residence # Steps to Enter: 0 One/Two Story Residence: One story Living Alone: No 
Support Systems: Family member(s) Patient Expects to be Discharged to[de-identified] Private residence Current DME Used/Available at Home: Other (comment)(walking stick) EXAMINATION/PRESENTATION/DECISION MAKING:  
Critical Behavior: 
Neurologic State: Alert Orientation Level: Oriented X4 Cognition: Follows commands, Appropriate safety awareness Range Of Motion: 
AROM: Within functional limits PROM: Within functional limits Strength:   
Strength: Generally decreased, functional 
  
 Tone & Sensation:  
Tone: Normal 
  
 Coordination: 
Coordination: Generally decreased, functional 
 
  
Functional Mobility: 
Bed Mobility: 
 Not assessed, patient up in bathroom on arrival 
 Transfers: 
Sit to Stand: Modified independent Stand to Sit: Modified independent Balance:  
Sitting: Intact; Without support Standing: Impaired; Without support Standing - Static: Good Standing - Dynamic : Fair Ambulation/Gait Training: 
Distance (ft): 385 Feet (ft) with several standing rests in 6 minutes Assistive Device: Gait belt Ambulation - Level of Assistance: Contact guard assistance Gait Description (WDL): Exceptions to [facility] Gait Abnormalities: Decreased step clearance; Path deviations 6 MWT results: (Specify if any supplemental oxygen is used, the type, pre, during and post sats.) Distance Walked in Feet (ft): 385 ft.   Simone Rating of Perceived exertion (0-10 point scale):  
Pre Heart Rate: 85 Beginnin Pre O2 Saturation: 98 Mid walk: 5 Post Heart Rate: 96 End: 6 Post O2 Saturation: 94   
Assistive device used: Assistive Device: Gait belt Normative data:  
Men 39-80 years old = 1889 feet; Women 3680 years mdh=0478 feet Modified 10 point Simone RPE scale utilized: 
0 = no breathlessness at all ---> 10 = maximum exertion Please refer to the flowsheet for any additional vital signs taken during this treatment. Functional Measure: 
Tinetti test: 
 
Sitting Balance: 1 Arises: 1 Attempts to Rise: 2 Immediate Standing Balance: 2 Standing Balance: 2 Nudged: 1 Eyes Closed: 0 Turn 360 Degrees - Continuous/Discontinuous: 1 Turn 360 Degrees - Steady/Unsteady: 0 Sitting Down: 1 Balance Score: 11 Indication of Gait: 1 
R Step Length/Height: 1 L Step Length/Height: 1 
R Foot Clearance: 1 L Foot Clearance: 1 Step Symmetry: 1 Step Continuity: 1 Path: 1 Trunk: 1 Walking Time: 1 Gait Score: 10 Total Score: 21 Tinetti Tool Score Risk of Falls 
<19 = High Fall Risk 19-24 = Moderate Fall Risk 25-28 = Low Fall Risk Tinetti ME. Performance-Oriented Assessment of Mobility Problems in Elderly Patients. Hartman 66; [id number]. (Scoring Description: PT Bulletin Feb. 10, 1993) Older adults: Valentina Metzger et al, 2009; n = [facility] [facility] elderly evaluated with ABC, XAVIER, ADL, and IADL) · Mean XAVIER score for males aged 69-68 years = 26.21(3.40) · Mean XAVIER score for females age 69-68 years = 25.16(4.30) · Mean XAVIER score for males over 80 years = 23.29(6.02) · Mean XAVIER score for females over 80 years = 17.20(8.32) Pain: 
Pain Scale 1: Numeric (0 - 10) Pain Intensity 1: 0 Activity Tolerance:  
 
Please refer to the flowsheet for vital signs taken during this treatment. After treatment:  
?         Patient left in no apparent distress sitting up in chair ? Patient left in no apparent distress in bed ?         Call bell left within reach ? Nursing notified ? Caregiver present ? Bed alarm activated COMMUNICATION/EDUCATION:  
The patient?s plan of care was discussed with: Registered Nurse. ?         Fall prevention education was provided and the patient/caregiver indicated understanding. ? Patient/family have participated as able in goal setting and plan of care. ?         Patient/family agree to work toward stated goals and plan of care. ?         Patient understands intent and goals of therapy, but is neutral about his/her participation. ? Patient is unable to participate in goal setting and plan of care. Thank you for this referral. 
Salas Logan, PT Time Calculation: 25 mins

## 2019-06-08 ENCOUNTER — APPOINTMENT (OUTPATIENT)
Dept: GENERAL RADIOLOGY | Age: 69
DRG: 222 | End: 2019-06-08
Attending: FAMILY MEDICINE
Payer: MEDICARE

## 2019-06-08 LAB
ANION GAP SERPL CALC-SCNC: 6 MMOL/L (ref 5–15)
BASOPHILS # BLD: 0 K/UL (ref 0–0.1)
BASOPHILS NFR BLD: 1 % (ref 0–1)
BUN SERPL-MCNC: 30 MG/DL (ref 6–20)
BUN/CREAT SERPL: 16 (ref 12–20)
CALCIUM SERPL-MCNC: 8.9 MG/DL (ref 8.5–10.1)
CHLORIDE SERPL-SCNC: 100 MMOL/L (ref 97–108)
CO2 SERPL-SCNC: 28 MMOL/L (ref 21–32)
CREAT SERPL-MCNC: 1.82 MG/DL (ref 0.7–1.3)
DIFFERENTIAL METHOD BLD: ABNORMAL
EOSINOPHIL # BLD: 0.2 K/UL (ref 0–0.4)
EOSINOPHIL NFR BLD: 3 % (ref 0–7)
ERYTHROCYTE [DISTWIDTH] IN BLOOD BY AUTOMATED COUNT: 14.1 % (ref 11.5–14.5)
GLUCOSE SERPL-MCNC: 95 MG/DL (ref 65–100)
HCT VFR BLD AUTO: 35.2 % (ref 36.6–50.3)
HGB BLD-MCNC: 11.2 G/DL (ref 12.1–17)
IMM GRANULOCYTES # BLD AUTO: 0 K/UL (ref 0–0.04)
IMM GRANULOCYTES NFR BLD AUTO: 0 % (ref 0–0.5)
LYMPHOCYTES # BLD: 0.8 K/UL (ref 0.8–3.5)
LYMPHOCYTES NFR BLD: 14 % (ref 12–49)
MCH RBC QN AUTO: 30.3 PG (ref 26–34)
MCHC RBC AUTO-ENTMCNC: 31.8 G/DL (ref 30–36.5)
MCV RBC AUTO: 95.1 FL (ref 80–99)
MONOCYTES # BLD: 0.6 K/UL (ref 0–1)
MONOCYTES NFR BLD: 12 % (ref 5–13)
NEUTS SEG # BLD: 3.9 K/UL (ref 1.8–8)
NEUTS SEG NFR BLD: 71 % (ref 32–75)
NRBC # BLD: 0 K/UL (ref 0–0.01)
NRBC BLD-RTO: 0 PER 100 WBC
PLATELET # BLD AUTO: 190 K/UL (ref 150–400)
PMV BLD AUTO: 9.5 FL (ref 8.9–12.9)
POTASSIUM SERPL-SCNC: 4.4 MMOL/L (ref 3.5–5.1)
RBC # BLD AUTO: 3.7 M/UL (ref 4.1–5.7)
SODIUM SERPL-SCNC: 134 MMOL/L (ref 136–145)
WBC # BLD AUTO: 5.5 K/UL (ref 4.1–11.1)

## 2019-06-08 PROCEDURE — 71045 X-RAY EXAM CHEST 1 VIEW: CPT

## 2019-06-08 PROCEDURE — 74011250636 HC RX REV CODE- 250/636: Performed by: INTERNAL MEDICINE

## 2019-06-08 PROCEDURE — 74011250637 HC RX REV CODE- 250/637: Performed by: INTERNAL MEDICINE

## 2019-06-08 PROCEDURE — 36415 COLL VENOUS BLD VENIPUNCTURE: CPT

## 2019-06-08 PROCEDURE — 74011250637 HC RX REV CODE- 250/637: Performed by: FAMILY MEDICINE

## 2019-06-08 PROCEDURE — 74011000250 HC RX REV CODE- 250: Performed by: INTERNAL MEDICINE

## 2019-06-08 PROCEDURE — 80048 BASIC METABOLIC PNL TOTAL CA: CPT

## 2019-06-08 PROCEDURE — 77030012890

## 2019-06-08 PROCEDURE — 85025 COMPLETE CBC W/AUTO DIFF WBC: CPT

## 2019-06-08 PROCEDURE — 74011000258 HC RX REV CODE- 258: Performed by: INTERNAL MEDICINE

## 2019-06-08 PROCEDURE — 65660000001 HC RM ICU INTERMED STEPDOWN

## 2019-06-08 RX ORDER — LANOLIN ALCOHOL/MO/W.PET/CERES
1.5 CREAM (GRAM) TOPICAL
Status: DISCONTINUED | OUTPATIENT
Start: 2019-06-08 | End: 2019-06-09

## 2019-06-08 RX ADMIN — AMIODARONE HYDROCHLORIDE 200 MG: 200 TABLET ORAL at 17:42

## 2019-06-08 RX ADMIN — APIXABAN 5 MG: 5 TABLET, FILM COATED ORAL at 08:06

## 2019-06-08 RX ADMIN — Medication 1.5 MG: at 20:35

## 2019-06-08 RX ADMIN — CARVEDILOL 12.5 MG: 12.5 TABLET, FILM COATED ORAL at 08:06

## 2019-06-08 RX ADMIN — ASPIRIN 81 MG: 81 TABLET ORAL at 08:06

## 2019-06-08 RX ADMIN — APIXABAN 5 MG: 5 TABLET, FILM COATED ORAL at 20:27

## 2019-06-08 RX ADMIN — AMIODARONE HYDROCHLORIDE 200 MG: 200 TABLET ORAL at 08:06

## 2019-06-08 RX ADMIN — Medication 10 ML: at 06:00

## 2019-06-08 RX ADMIN — BUMETANIDE 1 MG: 0.25 INJECTION INTRAMUSCULAR; INTRAVENOUS at 11:46

## 2019-06-08 RX ADMIN — Medication 10 ML: at 11:46

## 2019-06-08 RX ADMIN — CARVEDILOL 12.5 MG: 12.5 TABLET, FILM COATED ORAL at 16:50

## 2019-06-08 RX ADMIN — DOBUTAMINE IN DEXTROSE 5 MCG/KG/MIN: 200 INJECTION, SOLUTION INTRAVENOUS at 16:50

## 2019-06-08 RX ADMIN — Medication 10 ML: at 14:00

## 2019-06-08 RX ADMIN — CEFTRIAXONE 1 G: 1 INJECTION, POWDER, FOR SOLUTION INTRAMUSCULAR; INTRAVENOUS at 16:49

## 2019-06-08 RX ADMIN — Medication 10 ML: at 23:10

## 2019-06-08 NOTE — PROGRESS NOTES
1535: Patient found to have removed his IV tubing from his PIV that was running dobutamine, and the pump was off. Patient states, \"The pump was alarming and said it was complete so I took it off\" Patient educated to call for RN if his IV pump alarms and not to remove IV tubing or turn IV pump off. Patient without dobutamine for approximately one hour. Patient asymptomatic, vitals WDL. 1535: Bedside shift change report given to KATRIN Grace (oncoming nurse) by Lilian Marie RN (offgoing nurse). Report included the following information SBAR, Kardex, Intake/Output, MAR, Recent Results, Med Rec Status and Cardiac Rhythm A-Fib.

## 2019-06-08 NOTE — PROGRESS NOTES
0800 Bedside and Verbal shift change report given to Jodie Bush RN (oncoming nurse) by Francisco Mensah RN (offgoing nurse). Report included the following information SBAR, Kardex, Intake/Output, MAR and Recent Results. Problem: Heart Failure: Day 5 Goal: Off Pathway (Use only if patient is Off Pathway) Outcome: Progressing Towards Goal 
Note:  
Pt has a hard time resting comfortably without being dyspneic. Tried to encourage sleeping/resting in high fowlers but pt said he's tried it and it doesn't work. O2 sats were maintained at 96-98% throughout the night. Pt has some dyspnea with activity. Problem: Falls - Risk of 
Goal: *Absence of Falls Description Document [facility]al[facility]andro Joyce Fall Risk and appropriate interventions in the flowsheet. Outcome: Progressing Towards Goal 
Note:  
Fall Risk Interventions: 
Mobility Interventions: Communicate number of staff needed for ambulation/transfer, Patient to call before getting OOB, Utilize walker, cane, or other assistive device Medication Interventions: Evaluate medications/consider consulting pharmacy, Patient to call before getting OOB, Teach patient to arise slowly Elimination Interventions: Call light in reach, Patient to call for help with toileting needs, Urinal in reach History of Falls Interventions: Evaluate medications/consider consulting pharmacy, Room close to nurse's station

## 2019-06-08 NOTE — PROGRESS NOTES
Cardiology Progress Note                                        Admit Date: 5/31/2019 Assessment/Plan:  
 
Acute on chronic systolic HF; better; continue current regimen ARF/CKD; improving Afib; rate is controlled at 90s Anya Bingham is a 76 y.o. male with PROBLEM LIST: 
Patient Active Problem List  
 Diagnosis Date Noted  Acute on chronic systolic [facility]congest[facility] 05/31/2019  Paroxysmal atrial fibrillation ([facility] 04/02/2019 Subjective:  
 
Antoineus ARMAND Kiser reports none. Visit Vitals /86 (BP 1 Location: Right arm, BP Patient Position: At rest) Pulse 81 Temp 97.7 °F (36.5 °C) Resp 20 Wt 217 lb 9.6 oz (98.7 kg) SpO2 95% BMI 27.94 kg/m² Intake/Output Summary (Last 24 hours) at 6/8/2019 4336 Last data filed at 6/8/2019 0614 Gross per 24 hour Intake 50 ml Output 1315 ml Net -1265 ml Objective:  
  
Physical Exam: 
HEENT: Perrla, EOMI Neck: No JVD,  No thyroidmegaly Resp:  rales CV: irregular s1s2 No murmur no s3 Abd:Soft, Nontender Ext: No edema Neuro: Alert and oriented; Nonfocal 
Skin: Warm, Dry, Intact Pulses: 2+ DP/PT/Rad Telemetry: AFIB Current Facility-Administered Medications Medication Dose Route Frequency  bumetanide (BUMEX) injection 1 mg  1 mg IntraVENous DAILY WITH LUNCH  
 amiodarone (CORDARONE) tablet 200 mg  200 mg Oral BID  DOBUTamine (DOBUTREX) 500 mg/250 mL (2,000 mcg/mL) infusion  5 mcg/kg/min IntraVENous CONTINUOUS  
 cefTRIAXone (ROCEPHIN) 1 g in 0.9% sodium chloride (MBP/ADV) 50 mL  1 g IntraVENous Q24H  
 apixaban (ELIQUIS) tablet 5 mg  5 mg Oral Q12H  aspirin delayed-release tablet 81 mg  81 mg Oral DAILY  carvedilol (COREG) tablet 12.5 mg  12.5 mg Oral BID WITH MEALS  sodium chloride (NS) flush 5-40 mL  5-40 mL IntraVENous Q8H  
 sodium chloride (NS) flush 5-40 mL  5-40 mL IntraVENous PRN  
 ondansetron (ZOFRAN) injection 4 mg  4 mg IntraVENous Q4H PRN  
  bisacodyl (DULCOLAX) tablet 5 mg  5 mg Oral DAILY PRN  
 acetaminophen (TYLENOL) tablet 650 mg  650 mg Oral Q4H PRN  
 morphine injection 2 mg  2 mg IntraVENous Q4H PRN Data Review:  
Labs:   
Recent Results (from the past 24 hour(s)) CBC WITH AUTOMATED DIFF Collection Time: 06/08/19  3:59 AM  
Result Value Ref Range WBC 5.5 4.1 - 11.1 K/uL  
 RBC 3.70 (L) 4.10 - 5.70 M/uL  
 HGB 11.2 (L) 12.1 - 17.0 g/dL HCT 35.2 (L) 36.6 - 50.3 % MCV 95.1 80.0 - 99.0 FL  
 MCH 30.3 26.0 - 34.0 PG  
 MCHC 31.8 30.0 - 36.5 g/dL  
 RDW 14.1 11.5 - 14.5 % PLATELET 476 071 - 893 K/uL MPV 9.5 8.9 - 12.9 FL  
 NRBC 0.0 0  WBC ABSOLUTE NRBC 0.00 0.00 - 0.01 K/uL NEUTROPHILS 71 32 - 75 % LYMPHOCYTES 14 12 - 49 % MONOCYTES 12 5 - 13 % EOSINOPHILS 3 0 - 7 % BASOPHILS 1 0 - 1 % IMMATURE GRANULOCYTES 0 0.0 - 0.5 % ABS. NEUTROPHILS 3.9 1.8 - 8.0 K/UL  
 ABS. LYMPHOCYTES 0.8 0.8 - 3.5 K/UL  
 ABS. MONOCYTES 0.6 0.0 - 1.0 K/UL  
 ABS. EOSINOPHILS 0.2 0.0 - 0.4 K/UL  
 ABS. BASOPHILS 0.0 0.0 - 0.1 K/UL  
 ABS. IMM. GRANS. 0.0 0.00 - 0.04 K/UL  
 DF AUTOMATED METABOLIC PANEL, BASIC Collection Time: 06/08/19  3:59 AM  
Result Value Ref Range Sodium 134 (L) 136 - 145 mmol/L Potassium 4.4 3.5 - 5.1 mmol/L Chloride 100 97 - 108 mmol/L  
 CO2 28 21 - 32 mmol/L Anion gap 6 5 - 15 mmol/L Glucose 95 65 - 100 mg/dL BUN 30 (H) 6 - 20 MG/DL Creatinine 1.82 (H) 0.70 - 1.30 MG/DL  
 BUN/Creatinine ratio 16 12 - 20 GFR est AA 45 (L) >60 ml/min/1.73m2 GFR est non-AA 37 (L) >60 ml/min/1.73m2  Calcium 8.9 8.5 - 10.1 MG/DL  
 
 
 
 
 
 
 no

## 2019-06-08 NOTE — PROGRESS NOTES
Patient on dobutamine drip at 5mcg/kg/min. Bumex scheduled. Patient ambulating BID. Antibiotics for UTI. Problem: Heart Failure: Day 5 Goal: Medications Outcome: Progressing Towards Goal 
Goal: Respiratory 6/8/2019 1606 by Sonia Jiménez RN Outcome: Progressing Towards Goal 
6/8/2019 1005 by Sonia Jiménez RN Outcome: Progressing Towards Goal 
Goal: Treatments/Interventions/Procedures 6/8/2019 1606 by Sonia Jiménez RN Outcome: Progressing Towards Goal 
6/8/2019 1005 by Sonia Jiménez RN Outcome: Progressing Towards Goal 
  
Problem: Falls - Risk of 
Goal: *Absence of Falls Description Document [facility] Fall Risk and appropriate interventions in the flowsheet. 6/8/2019 1606 by Sonia Jiménez RN Outcome: Progressing Towards Goal 
6/8/2019 1005 by Sonia Jiménez RN Outcome: Progressing Towards Goal 
  
Problem: Urinary Tract Infection - Adult Goal: *Absence of infection signs and symptoms 6/8/2019 1606 by Sonia Jiménez RN Outcome: Progressing Towards Goal 
6/8/2019 1005 by Sonia Jiménez RN Outcome: Progressing Towards Goal

## 2019-06-08 NOTE — PROGRESS NOTES
Hospitalist Progress Note Jann Reilly MD 
Please call  and page for questions. Call physician on-call through the  7pm-7am 
 
Daily Progress Note: 6/8/2019 Primary care Denia Hough MD 
 
Date of admission: 5/31/2019  7:03 PM 
 
Admission summery and hospital course: 
66-year-old man with past medical history significant for chronic systolic congestive heart failure, dyslipidemia, hypertension, atrial fibrillation status post cardioversion, was in his usual state of health until the day of presentation at the emergency room when the patient developed worsening of his shortness of breath.  The inpatient underwent cardioversion early this morning by his cardiologist. 
Sylvia Amado started on 06/05. 
  
Subjective:  
Patient said he is feeling good. He gets SOB with minor physical activities as going to the restroom. Assessment/Plan:  
Acute-on-chronic systolic congestive heart failure NYHA 2-3  
JACQUE on 05/31 with EF of 21%-25%.      
Entresto hold for low BP for increased creatinine. Creatinine is improving now. Continue diuresis, Bumex, coreg, IV dobumain and O2 as needed. CTA of the chest is negative for pulmonary embolism.  Appreciated Dr Nielson/Dr Olvera input. Aldactone on hold now.  Night time oxymetry for the necessity of BiPAP.  
Patient needs 6 minutes walk prior to discharge. Will repeat CXR today.  
  
Atrial fibrillation, status post cardioversion.   
Rate controlled on amiodarone, continue Eliquis for anticoagulation. On Dobutamine.  
  
Hypertension. Hold On entresto. BP is reasonable now.  
  
Acute kidney injury.   
Most likely as a result of the diuretic therapy and or entresto.  Renal US on 06/05 was normal.  
  
UTI: Tmax as 100 on 06/06 PM. Afebrile since than. Continue to monitor with Rocephin.  
  
See orders for other plans. VTE prophylaxis: On Eliquis.  
Code status: Full Discussed plan of care with Patient/Family and Nurse. Patient wife is at the bedside. Pre-admission lived at home. Discharge planning: pending. Review of Systems:  
 
Review of Systems: 
Symptom  Y/N  Comments   Symptom  Y/N  Comments Fever/Chills  n    Chest Pain  n   
Poor Appetite   n   Edema  n    
Cough  y   Abdominal Pain  n    
Sputum  n   Joint Pain SOB/DOUGLAS  y   Pruritis/Rash Nausea/vomit     Tolerating PT/OT Diarrhea     Tolerating Diet Constipation     Other Could not obtain due to:    
 
 
Objective:  
Physical Exam:  
 
Visit Vitals /67 (BP 1 Location: Right arm, BP Patient Position: At rest) Pulse 78 Temp 97.3 °F (36.3 °C) Resp 18 Wt 98.7 kg (217 lb 9.6 oz) SpO2 99% BMI 27.94 kg/m² O2 Flow Rate (L/min): 3 l/min O2 Device: Room air Temp (24hrs), Av °F (36.7 °C), Min:97.3 °F (36.3 °C), Max:98.7 °F (37.1 °C) 
  701 -  1900 In: 36 [P.O.:820] Out: 150 [Urine:150]   1901 -  0700 In: 1058.8 [P.O.:480; I.V.:578.8] Out: 2305 [JBBU] General:  Alert, cooperative, no distress, appears stated age. Lungs:   Clear to auscultation bilaterally. Heart:  Regular rate and rhythm, S1, S2 normal, no murmur.   
Abdomen:   Soft, non-tender. Bowel sounds normal.   
Extremities: Extremities normal, atraumatic, no cyanosis. Edema at LEs. Skin: Skin color, texture, turgor normal. No rashes or lesions Neurologic: CNII-XII intact  
  
 
Data Review:  
   
Recent Days: 
Recent Labs  
  19 
0359 19 
0112 19 
0341 WBC 5.5 6.4 7.8 HGB 11.2* 11.1* 11.1*  
HCT 35.2* 34.3* 34.4*  
 162 138* Recent Labs  
  19 
0359 19 
0112 19 
0341 * 134* 132* K 4.4 4.0 4.1  100 98 CO2 28 29 27 GLU 95 101* 93 BUN 30* 35* 34* CREA 1.82* 2.10* 2.15* CA 8.9 8.7 8.4* No results for input(s): PH, PCO2, PO2, HCO3, FIO2 in the last 72 hours. 24 Hour Results: Recent Results (from the past 24 hour(s)) CBC WITH AUTOMATED DIFF Collection Time: 06/08/19  3:59 AM  
Result Value Ref Range WBC 5.5 4.1 - 11.1 K/uL  
 RBC 3.70 (L) 4.10 - 5.70 M/uL  
 HGB 11.2 (L) 12.1 - 17.0 g/dL HCT 35.2 (L) 36.6 - 50.3 % MCV 95.1 80.0 - 99.0 FL  
 MCH 30.3 26.0 - 34.0 PG  
 MCHC 31.8 30.0 - 36.5 g/dL  
 RDW 14.1 11.5 - 14.5 % PLATELET 972 950 - 472 K/uL MPV 9.5 8.9 - 12.9 FL  
 NRBC 0.0 0  WBC ABSOLUTE NRBC 0.00 0.00 - 0.01 K/uL NEUTROPHILS 71 32 - 75 % LYMPHOCYTES 14 12 - 49 % MONOCYTES 12 5 - 13 % EOSINOPHILS 3 0 - 7 % BASOPHILS 1 0 - 1 % IMMATURE GRANULOCYTES 0 0.0 - 0.5 % ABS. NEUTROPHILS 3.9 1.8 - 8.0 K/UL  
 ABS. LYMPHOCYTES 0.8 0.8 - 3.5 K/UL  
 ABS. MONOCYTES 0.6 0.0 - 1.0 K/UL  
 ABS. EOSINOPHILS 0.2 0.0 - 0.4 K/UL  
 ABS. BASOPHILS 0.0 0.0 - 0.1 K/UL  
 ABS. IMM. GRANS. 0.0 0.00 - 0.04 K/UL  
 DF AUTOMATED METABOLIC PANEL, BASIC Collection Time: 06/08/19  3:59 AM  
Result Value Ref Range Sodium 134 (L) 136 - 145 mmol/L Potassium 4.4 3.5 - 5.1 mmol/L Chloride 100 97 - 108 mmol/L  
 CO2 28 21 - 32 mmol/L Anion gap 6 5 - 15 mmol/L Glucose 95 65 - 100 mg/dL BUN 30 (H) 6 - 20 MG/DL Creatinine 1.82 (H) 0.70 - 1.30 MG/DL  
 BUN/Creatinine ratio 16 12 - 20 GFR est AA 45 (L) >60 ml/min/1.73m2 GFR est non-AA 37 (L) >60 ml/min/1.73m2 Calcium 8.9 8.5 - 10.1 MG/DL Problem List: 
Problem List as of 6/8/2019 Date Reviewed: 6/4/2019 Codes Class Noted - Resolved * (Principal) Acute on chronic systolic CHF (congestive heart failure) (HCC) ICD-10-CM: M12.79 ICD-9-CM: 428.23, 428.0  5/31/2019 - Present Paroxysmal atrial fibrillation (HCC) ICD-10-CM: I48.0 ICD-9-CM: 427.31  4/2/2019 - Present Medications reviewed Current Facility-Administered Medications Medication Dose Route Frequency  bumetanide (BUMEX) injection 1 mg  1 mg IntraVENous DAILY WITH LUNCH  
  amiodarone (CORDARONE) tablet 200 mg  200 mg Oral BID  DOBUTamine (DOBUTREX) 500 mg/250 mL (2,000 mcg/mL) infusion  5 mcg/kg/min IntraVENous CONTINUOUS  
 cefTRIAXone (ROCEPHIN) 1 g in 0.9% sodium chloride (MBP/ADV) 50 mL  1 g IntraVENous Q24H  
 apixaban (ELIQUIS) tablet 5 mg  5 mg Oral Q12H  aspirin delayed-release tablet 81 mg  81 mg Oral DAILY  carvedilol (COREG) tablet 12.5 mg  12.5 mg Oral BID WITH MEALS  sodium chloride (NS) flush 5-40 mL  5-40 mL IntraVENous Q8H  
 sodium chloride (NS) flush 5-40 mL  5-40 mL IntraVENous PRN  
 ondansetron (ZOFRAN) injection 4 mg  4 mg IntraVENous Q4H PRN  
 bisacodyl (DULCOLAX) tablet 5 mg  5 mg Oral DAILY PRN  
 acetaminophen (TYLENOL) tablet 650 mg  650 mg Oral Q4H PRN  
 morphine injection 2 mg  2 mg IntraVENous Q4H PRN Care Plan discussed with: Patient/Family and Nurse Total time spent with patient: 30 minutes.  
 
Hilton Lugo MD

## 2019-06-08 NOTE — PROGRESS NOTES
Problem: Heart Failure: Day 1 Goal: Off Pathway (Use only if patient is Off Pathway) Outcome: Progressing Towards Goal 
  
Problem: Heart Failure: Discharge Outcomes Goal: *Verbalizes understanding and describes prescribed diet Outcome: Progressing Towards Goal 
Goal: *Verbalizes understanding/describes prescribed medications Outcome: Progressing Towards Goal 
Goal: *Describes available resources and support systems Description 
(eg: Home Health, Palliative Care, Advanced Medical Directive) Outcome: Progressing Towards Goal 
Goal: *Understands and describes signs and symptoms to report to providers(Stroke Metric) Outcome: Progressing Towards Goal

## 2019-06-09 LAB
ANION GAP SERPL CALC-SCNC: 6 MMOL/L (ref 5–15)
BACTERIA SPEC CULT: NORMAL
BUN SERPL-MCNC: 37 MG/DL (ref 6–20)
BUN/CREAT SERPL: 20 (ref 12–20)
CALCIUM SERPL-MCNC: 8.8 MG/DL (ref 8.5–10.1)
CHLORIDE SERPL-SCNC: 102 MMOL/L (ref 97–108)
CO2 SERPL-SCNC: 27 MMOL/L (ref 21–32)
CREAT SERPL-MCNC: 1.88 MG/DL (ref 0.7–1.3)
GLUCOSE SERPL-MCNC: 99 MG/DL (ref 65–100)
POTASSIUM SERPL-SCNC: 4.4 MMOL/L (ref 3.5–5.1)
SERVICE CMNT-IMP: NORMAL
SODIUM SERPL-SCNC: 135 MMOL/L (ref 136–145)

## 2019-06-09 PROCEDURE — 74011250636 HC RX REV CODE- 250/636: Performed by: INTERNAL MEDICINE

## 2019-06-09 PROCEDURE — 74011250637 HC RX REV CODE- 250/637: Performed by: INTERNAL MEDICINE

## 2019-06-09 PROCEDURE — 74011000250 HC RX REV CODE- 250: Performed by: INTERNAL MEDICINE

## 2019-06-09 PROCEDURE — 77010033678 HC OXYGEN DAILY

## 2019-06-09 PROCEDURE — 74011000258 HC RX REV CODE- 258: Performed by: INTERNAL MEDICINE

## 2019-06-09 PROCEDURE — 65660000001 HC RM ICU INTERMED STEPDOWN

## 2019-06-09 PROCEDURE — 74011250637 HC RX REV CODE- 250/637: Performed by: FAMILY MEDICINE

## 2019-06-09 PROCEDURE — 36415 COLL VENOUS BLD VENIPUNCTURE: CPT

## 2019-06-09 PROCEDURE — 80048 BASIC METABOLIC PNL TOTAL CA: CPT

## 2019-06-09 RX ORDER — LANOLIN ALCOHOL/MO/W.PET/CERES
3 CREAM (GRAM) TOPICAL
Status: DISCONTINUED | OUTPATIENT
Start: 2019-06-09 | End: 2019-06-26 | Stop reason: HOSPADM

## 2019-06-09 RX ADMIN — ASPIRIN 81 MG: 81 TABLET ORAL at 08:38

## 2019-06-09 RX ADMIN — BUMETANIDE 1 MG: 0.25 INJECTION INTRAMUSCULAR; INTRAVENOUS at 12:00

## 2019-06-09 RX ADMIN — Medication 3 MG: at 21:25

## 2019-06-09 RX ADMIN — CEFTRIAXONE 1 G: 1 INJECTION, POWDER, FOR SOLUTION INTRAMUSCULAR; INTRAVENOUS at 17:26

## 2019-06-09 RX ADMIN — AMIODARONE HYDROCHLORIDE 200 MG: 200 TABLET ORAL at 08:38

## 2019-06-09 RX ADMIN — AMIODARONE HYDROCHLORIDE 200 MG: 200 TABLET ORAL at 17:27

## 2019-06-09 RX ADMIN — CARVEDILOL 12.5 MG: 12.5 TABLET, FILM COATED ORAL at 08:38

## 2019-06-09 RX ADMIN — CARVEDILOL 12.5 MG: 12.5 TABLET, FILM COATED ORAL at 17:27

## 2019-06-09 RX ADMIN — Medication 10 ML: at 15:54

## 2019-06-09 RX ADMIN — Medication 10 ML: at 21:25

## 2019-06-09 RX ADMIN — Medication 10 ML: at 06:00

## 2019-06-09 RX ADMIN — APIXABAN 5 MG: 5 TABLET, FILM COATED ORAL at 08:38

## 2019-06-09 RX ADMIN — APIXABAN 5 MG: 5 TABLET, FILM COATED ORAL at 21:25

## 2019-06-09 NOTE — PROGRESS NOTES
Hospitalist Progress Note Memo Piedra MD 
Please call  and page for questions. Call physician on-call through the  7pm-7am 
 
Daily Progress Note: 6/9/2019 Primary care Geneva Lyles MD 
 
Date of admission: 5/31/2019  7:03 PM 
 
Admission summery and hospital course: 
54-year-old man with past medical history significant for chronic systolic congestive heart failure, dyslipidemia, hypertension, atrial fibrillation status post cardioversion, was in his usual state of health until the day of presentation at the emergency room when the patient developed worsening of his shortness of breath.  The inpatient underwent cardioversion early this morning by his cardiologist. 
Ozell Vinny started on 06/05. 
  
Subjective:  
Patient said he gets SOB with minimal physical activities. Patient said he soul not sleep last night. Assessment/Plan:  
Acute-on-chronic systolic congestive heart failure NYHA 2-3  
JACQUE on 05/31 with EF of 21%-25%.      
Entresto hold for low BP for increased creatinine. Continue diuresis, amiodarone, Bumex, coreg, IV dobutamin and O2 as needed. CTA of the chest is negative for pulmonary embolism.  Appreciated Dr Nielson/Dr Olvera input. Aldactone on hold now.   
Patient needs 6 minutes walk prior to discharge.  
May need DCCV. Dr Jessy Brunner to see the patient at AM.  
  
Atrial fibrillation, status post cardioversion.   
Rate controlled on amiodarone, continue Eliquis for anticoagulation. On Dobutamine.  
  
Hypertension. Hold On entresto. BP is reasonable now.  
  
Acute kidney injury.   
Creatinine is improving. Most likely as a result of the diuretic therapy and or entresto.  Renal US on 06/05 was normal.  
 
UTI: Tmax as 100 on 06/06 PM. Afebrile since than. Continue to monitor with Rocephin.  
 
Insomnia: Will increase the melatonin.  
  
See orders for other plans. VTE prophylaxis: On Eliquis.  
Code status: Full Discussed plan of care with Patient/Family and Nurse. Patient wife is at the bedside.  
Pre-admission lived at home. Discharge planning: pending Review of Systems:  
 
Review of Systems: 
Symptom  Y/N  Comments   Symptom  Y/N  Comments Fever/Chills   n   Chest Pain  n   
Poor Appetite  n    Edema  n    
Cough  y   Abdominal Pain  n    
Sputum  n   Joint Pain SOB/DOUGLAS  y   Pruritis/Rash Nausea/vomit  n   Tolerating PT/OT Diarrhea     Tolerating Diet Constipation     Other Could not obtain due to:    
 
 
Objective:  
Physical Exam:  
 
Visit Vitals /81 (BP 1 Location: Right arm, BP Patient Position: At rest) Pulse 98 Temp 98.4 °F (36.9 °C) Resp 16 Wt 98.2 kg (216 lb 7.9 oz) SpO2 98% BMI 27.80 kg/m² O2 Flow Rate (L/min): 3 l/min O2 Device: Room air Temp (24hrs), Av.4 °F (36.9 °C), Min:97.3 °F (36.3 °C), Max:99.6 °F (37.6 °C) 
  701 - 1900 In: -  
Out: 500 [Urine:500]   1901 -  07 In: 1060 [P.O.:1060] Out: 9380 [XZYY] General:  Alert, cooperative, no distress, appears stated age. Lungs:   Clear to auscultation bilaterally. Heart:  Regular rate and rhythm, S1, S2 normal, no murmur.   
Abdomen:   Soft, non-tender. Bowel sounds normal.   
Extremities: Extremities normal, atraumatic, no cyanosis. Edema at LEs. Skin: Skin color, texture, turgor normal. No rashes or lesions Neurologic: Patient was SOB while he walked slowly at the hallway. Patient interacts well. Patient voice is clear.   
  
Data Review:  
   
Recent Days: 
Recent Labs  
  19 
03519 WBC 5.5 6.4 HGB 11.2* 11.1*  
HCT 35.2* 34.3*  
 162 Recent Labs  
  19 
0441 19 
0359 19 
011 * 134* 134* K 4.4 4.4 4.0  
 100 100 CO2 27 28 29 GLU 99 95 101* BUN 37* 30* 35* CREA 1.88* 1.82* 2.10* CA 8.8 8.9 8.7 No results for input(s): PH, PCO2, PO2, HCO3, FIO2 in the last 72 hours. 24 Hour Results: 
Recent Results (from the past 24 hour(s)) METABOLIC PANEL, BASIC Collection Time: 06/09/19  4:41 AM  
Result Value Ref Range Sodium 135 (L) 136 - 145 mmol/L Potassium 4.4 3.5 - 5.1 mmol/L Chloride 102 97 - 108 mmol/L  
 CO2 27 21 - 32 mmol/L Anion gap 6 5 - 15 mmol/L Glucose 99 65 - 100 mg/dL BUN 37 (H) 6 - 20 MG/DL Creatinine 1.88 (H) 0.70 - 1.30 MG/DL  
 BUN/Creatinine ratio 20 12 - 20 GFR est AA 43 (L) >60 ml/min/1.73m2 GFR est non-AA 36 (L) >60 ml/min/1.73m2 Calcium 8.8 8.5 - 10.1 MG/DL Problem List: 
Problem List as of 6/9/2019 Date Reviewed: 6/4/2019 Codes Class Noted - Resolved * (Principal) Acute on chronic systolic CHF (congestive heart failure) (HCC) ICD-10-CM: T85.46 ICD-9-CM: 428.23, 428.0  5/31/2019 - Present Paroxysmal atrial fibrillation (HCC) ICD-10-CM: I48.0 ICD-9-CM: 427.31  4/2/2019 - Present Medications reviewed Current Facility-Administered Medications Medication Dose Route Frequency  melatonin tablet 1.5 mg  1.5 mg Oral QHS PRN  
 bumetanide (BUMEX) injection 1 mg  1 mg IntraVENous DAILY WITH LUNCH  
 amiodarone (CORDARONE) tablet 200 mg  200 mg Oral BID  DOBUTamine (DOBUTREX) 500 mg/250 mL (2,000 mcg/mL) infusion  5 mcg/kg/min IntraVENous CONTINUOUS  
 cefTRIAXone (ROCEPHIN) 1 g in 0.9% sodium chloride (MBP/ADV) 50 mL  1 g IntraVENous Q24H  
 apixaban (ELIQUIS) tablet 5 mg  5 mg Oral Q12H  aspirin delayed-release tablet 81 mg  81 mg Oral DAILY  carvedilol (COREG) tablet 12.5 mg  12.5 mg Oral BID WITH MEALS  sodium chloride (NS) flush 5-40 mL  5-40 mL IntraVENous Q8H  
 sodium chloride (NS) flush 5-40 mL  5-40 mL IntraVENous PRN  
 ondansetron (ZOFRAN) injection 4 mg  4 mg IntraVENous Q4H PRN  
 bisacodyl (DULCOLAX) tablet 5 mg  5 mg Oral DAILY PRN  
 acetaminophen (TYLENOL) tablet 650 mg  650 mg Oral Q4H PRN  
 morphine injection 2 mg  2 mg IntraVENous Q4H PRN  
 
 
 Care Plan discussed with: Patient/Family and Nurse Total time spent with patient: 30 minutes.  
 
Michelle Vazquez MD

## 2019-06-09 NOTE — PROGRESS NOTES
Bedside shift change report given to [facility] (oncoming nurse) by [facility] (offgoing nurse). Report included the following information SBAR, Kardex, Intake/Output, MAR, Recent Results and Cardiac Rhythm Afib.

## 2019-06-09 NOTE — PROGRESS NOTES
Bedside shift change report given to [facility] (oncoming nurse) by IAIN Sherwood (offgoing nurse). Report included the following information SBAR, Kardex, Procedure Summary, MAR and Recent Results.

## 2019-06-09 NOTE — PROGRESS NOTES
Cardiology Progress Note                                        Admit Date: 5/31/2019 Assessment/Plan:  
 
PND; still causing him to lose sleep at night; might improve if he returns to sinus rhythm Afib; recurrent; may need repeat DCCV 
CHF;acute on chronic systolic; improving on IV Dobutamine ARF/CKD; not any better than yesterday Matt Bailey is a 76 y.o. male with PROBLEM LIST: 
Patient Active Problem List  
 Diagnosis Date Noted  Acute on chronic systolic CHF (congestive heart failure) ([facility] 05/31/2019  Paroxysmal atrial fibrillation ([facility] 04/02/2019 Subjective:  
 
Sona Kiser reports none. Visit Vitals /66 (BP 1 Location: Left arm, BP Patient Position: At rest) Pulse 97 Temp 97.3 °F (36.3 °C) Resp 18 Wt 217 lb 9.6 oz (98.7 kg) SpO2 99% BMI 27.94 kg/m² Intake/Output Summary (Last 24 hours) at 6/9/2019 5905 Last data filed at 6/9/2019 1424 Gross per 24 hour Intake 1060 ml Output 1100 ml Net -40 ml Objective:  
  
Physical Exam: 
HEENT: Perrla, EOMI Neck: No JVD,  No thyroidmegaly Resp: some rales CV: irregular s1s2 No murmur no s3 Abd:Soft, Nontender Ext: No edema Neuro: Alert and oriented; Nonfocal 
Skin: Warm, Dry, Intact Pulses: 2+ DP/PT/Rad Telemetry: AFIB Current Facility-Administered Medications Medication Dose Route Frequency  melatonin tablet 1.5 mg  1.5 mg Oral QHS PRN  
 bumetanide (BUMEX) injection 1 mg  1 mg IntraVENous DAILY WITH LUNCH  
 amiodarone (CORDARONE) tablet 200 mg  200 mg Oral BID  DOBUTamine (DOBUTREX) 500 mg/250 mL (2,000 mcg/mL) infusion  5 mcg/kg/min IntraVENous CONTINUOUS  
 cefTRIAXone (ROCEPHIN) 1 g in 0.9% sodium chloride (MBP/ADV) 50 mL  1 g IntraVENous Q24H  
 apixaban (ELIQUIS) tablet 5 mg  5 mg Oral Q12H  aspirin delayed-release tablet 81 mg  81 mg Oral DAILY  carvedilol (COREG) tablet 12.5 mg  12.5 mg Oral BID WITH MEALS  
  sodium chloride (NS) flush 5-40 mL  5-40 mL IntraVENous Q8H  
 sodium chloride (NS) flush 5-40 mL  5-40 mL IntraVENous PRN  
 ondansetron (ZOFRAN) injection 4 mg  4 mg IntraVENous Q4H PRN  
 bisacodyl (DULCOLAX) tablet 5 mg  5 mg Oral DAILY PRN  
 acetaminophen (TYLENOL) tablet 650 mg  650 mg Oral Q4H PRN  
 morphine injection 2 mg  2 mg IntraVENous Q4H PRN Data Review:  
Labs:   
Recent Results (from the past 24 hour(s)) METABOLIC PANEL, BASIC Collection Time: 06/09/19  4:41 AM  
Result Value Ref Range Sodium 135 (L) 136 - 145 mmol/L Potassium 4.4 3.5 - 5.1 mmol/L Chloride 102 97 - 108 mmol/L  
 CO2 27 21 - 32 mmol/L Anion gap 6 5 - 15 mmol/L Glucose 99 65 - 100 mg/dL BUN 37 (H) 6 - 20 MG/DL Creatinine 1.88 (H) 0.70 - 1.30 MG/DL  
 BUN/Creatinine ratio 20 12 - 20 GFR est AA 43 (L) >60 ml/min/1.73m2 GFR est non-AA 36 (L) >60 ml/min/1.73m2  Calcium 8.8 8.5 - 10.1 MG/DL

## 2019-06-10 ENCOUNTER — DOCUMENTATION ONLY (OUTPATIENT)
Dept: CASE MANAGEMENT | Age: 69
End: 2019-06-10

## 2019-06-10 ENCOUNTER — ANESTHESIA (OUTPATIENT)
Dept: CARDIAC CATH/INVASIVE PROCEDURES | Age: 69
DRG: 222 | End: 2019-06-10
Payer: MEDICARE

## 2019-06-10 ENCOUNTER — ANESTHESIA EVENT (OUTPATIENT)
Dept: CARDIAC CATH/INVASIVE PROCEDURES | Age: 69
DRG: 222 | End: 2019-06-10
Payer: MEDICARE

## 2019-06-10 LAB
ANION GAP SERPL CALC-SCNC: 7 MMOL/L (ref 5–15)
BUN SERPL-MCNC: 37 MG/DL (ref 6–20)
BUN/CREAT SERPL: 19 (ref 12–20)
CALCIUM SERPL-MCNC: 8.7 MG/DL (ref 8.5–10.1)
CHLORIDE SERPL-SCNC: 101 MMOL/L (ref 97–108)
CO2 SERPL-SCNC: 27 MMOL/L (ref 21–32)
CREAT SERPL-MCNC: 1.91 MG/DL (ref 0.7–1.3)
GLUCOSE SERPL-MCNC: 100 MG/DL (ref 65–100)
POTASSIUM SERPL-SCNC: 4.4 MMOL/L (ref 3.5–5.1)
SODIUM SERPL-SCNC: 135 MMOL/L (ref 136–145)

## 2019-06-10 PROCEDURE — 74011250636 HC RX REV CODE- 250/636: Performed by: INTERNAL MEDICINE

## 2019-06-10 PROCEDURE — 77030039046 HC PAD DEFIB RADIOTRNSPNT CNMD -B: Performed by: INTERNAL MEDICINE

## 2019-06-10 PROCEDURE — 36415 COLL VENOUS BLD VENIPUNCTURE: CPT

## 2019-06-10 PROCEDURE — 76060000031 HC ANESTHESIA FIRST 0.5 HR: Performed by: INTERNAL MEDICINE

## 2019-06-10 PROCEDURE — 97530 THERAPEUTIC ACTIVITIES: CPT

## 2019-06-10 PROCEDURE — 74011000258 HC RX REV CODE- 258: Performed by: INTERNAL MEDICINE

## 2019-06-10 PROCEDURE — 51798 US URINE CAPACITY MEASURE: CPT

## 2019-06-10 PROCEDURE — 97116 GAIT TRAINING THERAPY: CPT

## 2019-06-10 PROCEDURE — 65660000001 HC RM ICU INTERMED STEPDOWN

## 2019-06-10 PROCEDURE — 74011250637 HC RX REV CODE- 250/637: Performed by: INTERNAL MEDICINE

## 2019-06-10 PROCEDURE — 74011250636 HC RX REV CODE- 250/636

## 2019-06-10 PROCEDURE — 5A2204Z RESTORATION OF CARDIAC RHYTHM, SINGLE: ICD-10-PCS | Performed by: INTERNAL MEDICINE

## 2019-06-10 PROCEDURE — 94660 CPAP INITIATION&MGMT: CPT

## 2019-06-10 PROCEDURE — 80048 BASIC METABOLIC PNL TOTAL CA: CPT

## 2019-06-10 PROCEDURE — 92960 CARDIOVERSION ELECTRIC EXT: CPT | Performed by: INTERNAL MEDICINE

## 2019-06-10 PROCEDURE — 74011000250 HC RX REV CODE- 250: Performed by: INTERNAL MEDICINE

## 2019-06-10 PROCEDURE — 93005 ELECTROCARDIOGRAM TRACING: CPT

## 2019-06-10 RX ORDER — PROPOFOL 10 MG/ML
INJECTION, EMULSION INTRAVENOUS AS NEEDED
Status: DISCONTINUED | OUTPATIENT
Start: 2019-06-10 | End: 2019-06-10 | Stop reason: HOSPADM

## 2019-06-10 RX ORDER — BUMETANIDE 0.25 MG/ML
1 INJECTION INTRAMUSCULAR; INTRAVENOUS 2 TIMES DAILY
Status: DISCONTINUED | OUTPATIENT
Start: 2019-06-10 | End: 2019-06-13

## 2019-06-10 RX ORDER — MILRINONE LACTATE 0.2 MG/ML
0.12 INJECTION, SOLUTION INTRAVENOUS CONTINUOUS
Status: DISPENSED | OUTPATIENT
Start: 2019-06-10 | End: 2019-06-18

## 2019-06-10 RX ADMIN — APIXABAN 5 MG: 5 TABLET, FILM COATED ORAL at 08:21

## 2019-06-10 RX ADMIN — DOBUTAMINE IN DEXTROSE 5 MCG/KG/MIN: 200 INJECTION, SOLUTION INTRAVENOUS at 05:40

## 2019-06-10 RX ADMIN — APIXABAN 5 MG: 5 TABLET, FILM COATED ORAL at 20:49

## 2019-06-10 RX ADMIN — BUMETANIDE 1 MG: 0.25 INJECTION INTRAMUSCULAR; INTRAVENOUS at 16:39

## 2019-06-10 RX ADMIN — MILRINONE LACTATE IN DEXTROSE 0.38 MCG/KG/MIN: 200 INJECTION, SOLUTION INTRAVENOUS at 17:37

## 2019-06-10 RX ADMIN — CEFTRIAXONE 1 G: 1 INJECTION, POWDER, FOR SOLUTION INTRAMUSCULAR; INTRAVENOUS at 16:39

## 2019-06-10 RX ADMIN — Medication 10 ML: at 22:00

## 2019-06-10 RX ADMIN — Medication 10 ML: at 14:00

## 2019-06-10 RX ADMIN — PROPOFOL 75 MG: 10 INJECTION, EMULSION INTRAVENOUS at 14:55

## 2019-06-10 RX ADMIN — AMIODARONE HYDROCHLORIDE 200 MG: 200 TABLET ORAL at 17:37

## 2019-06-10 RX ADMIN — Medication 10 ML: at 06:00

## 2019-06-10 RX ADMIN — AMIODARONE HYDROCHLORIDE 200 MG: 200 TABLET ORAL at 08:21

## 2019-06-10 RX ADMIN — CARVEDILOL 12.5 MG: 12.5 TABLET, FILM COATED ORAL at 16:39

## 2019-06-10 RX ADMIN — CARVEDILOL 12.5 MG: 12.5 TABLET, FILM COATED ORAL at 08:21

## 2019-06-10 RX ADMIN — ASPIRIN 81 MG: 81 TABLET ORAL at 08:21

## 2019-06-10 NOTE — PROGRESS NOTES
Louann Valdivia MD  
Physician  
Cardiology Progress Notes Addendum Date of Service:  06/06/19 1624 []Hide copied text []Huseyin for details 
 
 
  
 [facility] Cardiology Progress Note                                        FKGVL Date: 5/31/2019 
  
  
Pt reports that he is feeling a little better; still used bipap at night to sleep; walked less yesterday due to shortness of breath  
  
Assessment/Plan: # Acute on chronic systolic HF -EF 06% 
- improved initially  with diuretics, but creat increased and BP dropped; now improved with dobutamine;    will increase bumex;  
-compression socks -Bipap for home? no desat can be documented to help with PND 
  
# AF -reverted to afib, cont eliquis and amiodarone; in aflutter, will do CV again today 
- Consider ablation in future.  
  
# LBBB - he has progressed recently with LBBB. Will upgrade to BiV ICD/pacer in future-will need to hold off in light of UTI. 
  
# acute on chronic kidney injury-renal US normal; stabilized with holding most of his meds and starting dobutamine. UO good. Will increase diuresis 
  
# fever- urine culture positive with gram-rods; on rocephin-last day tomorrow Exam: 
Blood pressure 120/88, pulse 79, temperature 98.2 °F (36.8 °C), resp. rate 18, weight 100.3 kg (221 lb 1.9 oz), SpO2 96 %. Lungs clear Cor irreg with S3 Ext with 3+ edema Labs: 
Cr 1.91. K 4.4 Zane Wakefield MD

## 2019-06-10 NOTE — PROGRESS NOTES
TRANSFER - OUT REPORT: 
 
Verbal report given to Kati Manriquez on Rebecca Ek being transferred to [facility] Room 421 for routine progression of care Report consisted of patients Situation, Background, Assessment and  
Recommendations(SBAR). Information from the following report(s) SBAR was reviewed with the receiving nurse. Opportunity for questions and clarification was provided.

## 2019-06-10 NOTE — PROGRESS NOTES
Problem: Heart Failure: Day 5 Goal: Activity/Safety Outcome: Progressing Towards Goal 
 Patient able to ambulate in the hallway. Problem: Heart Failure: Day 5 Goal: Respiratory Outcome: Not Progressing Towards Goal 
 Patient becomes short of breath periodically. Bedside shift change report given to [address] (oncoming nurse) by Lang Hargrove (offgoing nurse). Report included the following information SBAR, Kardex, Intake/Output, MAR, Recent Results and Cardiac Rhythm Afib.

## 2019-06-10 NOTE — PROGRESS NOTES
Cardiac Cath Lab Procedure Area Arrival Note: 
 
Anabel Herrera arrived to Cardiac Cath Lab, Procedure Area. Patient identifiers verified with NAME and DATE OF BIRTH. Procedure verified with patient. Consent forms verified. Allergies verified. Patient informed of procedure and plan of care. Questions answered with review. Patient voiced understanding of procedure and plan of care. Patient on cardiac monitor, non-invasive blood pressure, SPO2 monitor. On room air and placed on O2 @ 2 lpm via NC.  IV of Normal saline on pump at 25 ml/hr. Patient status doing well without problems. Patient is A&Ox 4. Patient reports no pain. Patient medicated during procedure with orders obtained and verified by Dr. Ba Chavis. Refer to patients Cardiac Cath Lab PROCEDURE REPORT for vital signs, assessment, status, and response during procedure, printed at end of case. Printed report on chart or scanned into chart.

## 2019-06-10 NOTE — PROGRESS NOTES
Hospitalist Progress Note Ana María Mcnally MD 
Please call  and page for questions. Call physician on-call through the  7pm-7am 
 
Daily Progress Note: 6/10/2019 Primary care Jade Orr MD 
 
Date of admission: 5/31/2019  7:03 PM 
 
Admission summery and hospital course: 
75-year-old man with past medical history significant for chronic systolic congestive heart failure, dyslipidemia, hypertension, atrial fibrillation status post cardioversion, was in his usual state of health until the day of presentation at the emergency room when the patient developed worsening of his shortness of breath.  The inpatient underwent cardioversion early this morning by his cardiologist. 
Charlanne Luxora started on 06/05. 
  
Subjective:  
Patient reporting SOB when he moves around. Also feels crackles in lungs when he gets short of breath. Says that his heart is still in Afib and he is going for another CV today. Assessment/Plan:  
Acute-on-chronic systolic congestive heart failure NYHA 2-3  
JACQUE on 05/31 with EF of 21%-25%.      
Entresto hold for low BP for increased creatinine. Continue diuresis, amiodarone, Bumex, coreg, IV dobutamin and O2 as needed. CTA of the chest is negative for pulmonary embolism.  Appreciated Dr Nielson/Dr Olvera input. Aldactone on hold now.   
Patient needs 6 minutes walk prior to discharge.  
Will continue to follow cardiology recommendations. 
  
Atrial fibrillation, status post cardioversion.   
Pt still in Afib. Will undergo cardioversion today as per cardiology. Meanwhile continue amiodarone, coreg. On Eliquis for anticoagulation. On Dobutamine.  
  
Hypertension. Hold On entresto. BP is reasonable now.  
  
Acute kidney injury.   
Creatinine is improving. Most likely as a result of the diuretic therapy and or entresto.  Renal US on 06/05 was normal.  
 
UTI: Tmax as 100 on 06/06 PM. Afebrile since than. Continue to monitor with Rocephin.  
Ucx c/w Enterobacter Cloacae Insomnia: Will increase the melatonin.  
  
See orders for other plans. VTE prophylaxis: On Eliquis.  
Code status: Full Discussed plan of care with Patient/Family and Nurse. Patient wife is at the bedside.  
Pre-admission lived at home. Discharge planning: pending Review of Systems:  
 
Review of Systems: 
Symptom  Y/N  Comments   Symptom  Y/N  Comments Fever/Chills   n   Chest Pain  n   
Poor Appetite  n    Edema  n    
Cough  y   Abdominal Pain  n    
Sputum  n   Joint Pain SOB/DOUGLAS  y   Pruritis/Rash Nausea/vomit  n   Tolerating PT/OT Diarrhea     Tolerating Diet Constipation     Other Could not obtain due to:    
 
 
Objective:  
Physical Exam:  
 
Visit Vitals /88 (BP 1 Location: Left arm, BP Patient Position: At rest;Sitting) Pulse 79 Temp 98.2 °F (36.8 °C) Resp 18 Wt 100.3 kg (221 lb 1.9 oz) SpO2 96% BMI 28.39 kg/m² O2 Flow Rate (L/min): 3 l/min O2 Device: Room air Temp (24hrs), Av.2 °F (36.8 °C), Min:97.9 °F (36.6 °C), Max:98.8 °F (37.1 °C) 
  06/10 0701 - 06/10 1900 In: 0 Out: 100 [Urine:100]   1901 - 06/10 0700 In: 700 [P.O.:700] Out:  [Urine:207] General:  Alert, cooperative, no distress, appears stated age. Lungs:   Crackles in the bases. Heart:  Regular rate and rhythm, S1, S2 normal, no murmur.   
Abdomen:   Soft, non-tender. Bowel sounds normal.   
Extremities: Extremities normal, atraumatic, no cyanosis. Edema at LEs. Skin: Skin color, texture, turgor normal. No rashes or lesions Neurologic: Patient was SOB while he walked slowly at the hallway. Patient interacts well. Patient voice is clear.   
  
Data Review:  
   
Recent Days: 
Recent Labs  
  19 
0359 WBC 5.5 HGB 11.2* HCT 35.2*  
 Recent Labs  
  06/10/19 
0541 19 
0441 19 
0359 * 135* 134* K 4.4 4.4 4.4  102 100 CO2 27 27 28  99 95 BUN 37* 37* 30* CREA 1.91* 1.88* 1.82* CA 8.7 8.8 8.9 No results for input(s): PH, PCO2, PO2, HCO3, FIO2 in the last 72 hours. 24 Hour Results: 
Recent Results (from the past 24 hour(s)) METABOLIC PANEL, BASIC Collection Time: 06/10/19  5:41 AM  
Result Value Ref Range Sodium 135 (L) 136 - 145 mmol/L Potassium 4.4 3.5 - 5.1 mmol/L Chloride 101 97 - 108 mmol/L  
 CO2 27 21 - 32 mmol/L Anion gap 7 5 - 15 mmol/L Glucose 100 65 - 100 mg/dL BUN 37 (H) 6 - 20 MG/DL Creatinine 1.91 (H) 0.70 - 1.30 MG/DL  
 BUN/Creatinine ratio 19 12 - 20 GFR est AA 43 (L) >60 ml/min/1.73m2 GFR est non-AA 35 (L) >60 ml/min/1.73m2 Calcium 8.7 8.5 - 10.1 MG/DL Problem List: 
Problem List as of 6/10/2019 Date Reviewed: 6/4/2019 Codes Class Noted - Resolved * (Principal) Acute on chronic systolic CHF (congestive heart failure) (HCC) ICD-10-CM: H38.52 ICD-9-CM: 428.23, 428.0  5/31/2019 - Present Paroxysmal atrial fibrillation (HCC) ICD-10-CM: I48.0 ICD-9-CM: 427.31  4/2/2019 - Present Medications reviewed Current Facility-Administered Medications Medication Dose Route Frequency  melatonin tablet 3 mg  3 mg Oral QHS PRN  
 bumetanide (BUMEX) injection 1 mg  1 mg IntraVENous DAILY WITH LUNCH  
 amiodarone (CORDARONE) tablet 200 mg  200 mg Oral BID  DOBUTamine (DOBUTREX) 500 mg/250 mL (2,000 mcg/mL) infusion  5 mcg/kg/min IntraVENous CONTINUOUS  
 cefTRIAXone (ROCEPHIN) 1 g in 0.9% sodium chloride (MBP/ADV) 50 mL  1 g IntraVENous Q24H  
 apixaban (ELIQUIS) tablet 5 mg  5 mg Oral Q12H  aspirin delayed-release tablet 81 mg  81 mg Oral DAILY  carvedilol (COREG) tablet 12.5 mg  12.5 mg Oral BID WITH MEALS  sodium chloride (NS) flush 5-40 mL  5-40 mL IntraVENous Q8H  
 sodium chloride (NS) flush 5-40 mL  5-40 mL IntraVENous PRN  
 ondansetron (ZOFRAN) injection 4 mg  4 mg IntraVENous Q4H PRN  
  bisacodyl (DULCOLAX) tablet 5 mg  5 mg Oral DAILY PRN  
 acetaminophen (TYLENOL) tablet 650 mg  650 mg Oral Q4H PRN  
 morphine injection 2 mg  2 mg IntraVENous Q4H PRN Care Plan discussed with: Patient/Family and Nurse Total time spent with patient: 30 minutes.  
 
Curtis Singh MD

## 2019-06-10 NOTE — PROGRESS NOTES
Transitional Care Team: Follow-Up [facility] Note        Assessment & Plan   Cardioversion today--patient reports he's scheduled for this at 2:30P  WBC trending down       In to see Mr. Dino Koenig who reports feeling better. Sitting up in chair, no dyspnea noted. Wife not present. Wished him well on CV this afternoon. Will follow and offer to help complete AMD tomorrow if appropriate.

## 2019-06-10 NOTE — ANESTHESIA PREPROCEDURE EVALUATION
Relevant Problems No relevant active problems Anesthetic History No history of anesthetic complications Review of Systems / Medical History Patient summary reviewed, nursing notes reviewed and pertinent labs reviewed Pulmonary Within defined limits Sleep apnea: BiPAP Neuro/Psych Within defined limits Cardiovascular Hypertension: well controlled CHF: NYHA Classification II Dysrhythmias : atrial fibrillation CAD and CABG Exercise tolerance: <4 METS Comments: LBBB  
GI/Hepatic/Renal 
Within defined limits Renal disease: CRI Endo/Other Arthritis Other Findings Physical Exam 
 
Airway Mallampati: II 
TM Distance: > 6 cm Neck ROM: normal range of motion Mouth opening: Normal 
 
 Cardiovascular Rhythm: irregular Murmur: Grade 2, Mitral area Dental 
 
Dentition: Edentulous Pulmonary Breath sounds clear to auscultation Abdominal 
GI exam deferred Other Findings Anesthetic Plan ASA: 3 Anesthesia type: general 
 
 
 
 
Induction: Intravenous Anesthetic plan and risks discussed with: Patient

## 2019-06-10 NOTE — PROGRESS NOTES
Problem: Mobility Impaired (Adult and Pediatric) Goal: *Acute Goals and Plan of Care (Insert Text) Description Physical Therapy Goals Initiated 6/7/2019 1. Patient will ambulate with modified independence for 400 feet with the least restrictive device within 7 day(s). Outcome: Progressing Towards Goal 
 PHYSICAL THERAPY TREATMENT Patient: Nickie Reddy (77 y.o. male) Date: 6/10/2019 Diagnosis: Acute on chronic systolic CHF (congestive heart failure) ([facility]) [I50.23] Acute on chronic systolic CHF (congestive heart failure) ([facility] Procedure(s) (LRB): 
EP CARDIOVERSION (N/A) Day of Surgery Precautions:   
Chart, physical therapy assessment, plan of care and goals were reviewed. ASSESSMENT: 
Based on the objective data described below, the patient presents with Modified independent level overall for transfers. Gait training completed at Supervision, 400 feet and using a straight cane and gait belt. One standing erst break to assess O2 and HR. WNL throughout mobility. Improved balance with SPC. Noted SOB once seated and HR climbing to 108BPM while O2 saturations 97%. Plan for cardioversion today. Is cleared to ambulate with wife in hallway. The following are barriers to independence while in acute care:  
-Cognitive and/or behavioral: None  
-Medical condition: cardiopulmonary tolerance, cardiac tolerance and pulmonary tolerance   
-Other:    
 
Prior level of function: Independent PLAN: 
Patient continues to benefit from skilled intervention to address the above impairments. Continue treatment per established plan of care. Recommend with staff: Up walking 2-3x/day Recommend next PT session: stairs Discharge recommendations: None Patient's barriers to discharging home, in addition to above impairments: none. Equipment recommendations for successful discharge (if) home: None SUBJECTIVE:  
Patient stated ? How do I catch my breath? .? OBJECTIVE DATA SUMMARY:  
 Critical Behavior: 
Neurologic State: Alert Orientation Level: Oriented X4 Cognition: Appropriate decision making, Follows commands Functional Mobility Training: 
Bed Mobility: 
  
  
  
  
  
  
Transfers: 
Sit to Stand: Modified independent Stand to Sit: Independent Balance: 
Sitting: Intact Standing: Intact Standing - Static: Good Standing - Dynamic : Good Ambulation/Gait Training: 
Distance (ft): 420 Feet (ft) Assistive Device: Gait belt;Cane, straight Ambulation - Level of Assistance: Supervision Activity Tolerance:  
Good Please refer to the flowsheet for vital signs taken during this treatment. After treatment patient left:  
Up in chair Caregiver at bedside Call light within reach RN notified COMMUNICATION/COLLABORATION:  
The patient?s plan of care was discussed with: Registered Nurse Maria Fernanda Owens, PT Time Calculation: 23 mins

## 2019-06-10 NOTE — PROGRESS NOTES
TRANSFER - IN REPORT: 
 
Verbal report received from Cortney on [address]  being received from procedure room for routine progression of care. Report consisted of patients Situation, Background, Assessment and Recommendations(SBAR). Information from the following report(s) SBAR was reviewed with the receiving clinician. Opportunity for questions and clarification was provided. Assessment completed upon patients arrival to [address] [place] [address] and care assumed. Cardiac Cath Lab Recovery Arrival Note: 
 
[address] arrived to [facility] recovery area. Patient procedure= DC CV. Patient on cardiac monitor, non-invasive blood pressure, SPO2 monitor. On  O2 @ 3 lpm via NC.  IV  of Dobut @ 5mcgs. Patient status doing well without problems. Patient is A&Ox 3. Patient reports no CP. No change in patient status. Continue to monitor patient and status.

## 2019-06-10 NOTE — PROGRESS NOTES
Bedside shift change report given to KATRIN Jacobson (oncoming nurse) by Alyssa Bermudez (offgoing nurse). Report included the following information SBAR, Kardex, ED Summary, Procedure Summary, Intake/Output, MAR, Accordion, Recent Results, Cardiac Rhythm Afib, Alarm Parameters  and Quality Measures.

## 2019-06-10 NOTE — ANESTHESIA POSTPROCEDURE EVALUATION
Post-Anesthesia Evaluation and Assessment Patient: Arianna Wynne MRN: [id number]  SSN: [id number] YOB: 1950  Age: 76 y.o. Sex: male I have evaluated the patient and they are stable and ready for discharge from the PACU. Cardiovascular Function/Vital Signs Visit Vitals /70 Pulse 70 Temp 36.9 °C (98.4 °F) Resp 18 Wt 100.3 kg (221 lb 1.9 oz) SpO2 96% BMI 28.39 kg/m² Patient is status post General anesthesia for Procedure(s): EP CARDIOVERSION. Nausea/Vomiting: None Postoperative hydration reviewed and adequate. Pain: 
Pain Scale 1: Numeric (0 - 10) (06/10/19 1508) Pain Intensity 1: 0 (06/10/19 1508) Managed Neurological Status:  
Neuro Neurologic State: Alert (06/10/19 5890) Orientation Level: Oriented X4 (06/10/19 5470) Cognition: Appropriate decision making; Follows commands (06/10/19 9326) At baseline Mental Status, Level of Consciousness: Alert and  oriented to person, place, and time Pulmonary Status:  
O2 Device: CO2 nasal cannula (06/10/19 7424) Adequate oxygenation and airway patent Complications related to anesthesia: None Post-anesthesia assessment completed. No concerns Signed By: Matt Pollard MD   
 Kimberly 10, 2019 Procedure(s): EP CARDIOVERSION. general 
 
<BSHSIANPOST> Vitals Value Taken Time /70 6/10/2019  3:13 PM  
Temp Pulse 74 6/10/2019  3:20 PM  
Resp 19 6/10/2019  3:20 PM  
SpO2 100 % 6/10/2019  3:20 PM  
Vitals shown include unvalidated device data.

## 2019-06-10 NOTE — PROGRESS NOTES
Problem: Heart Failure: Day 5 Goal: Off Pathway (Use only if patient is Off Pathway) Outcome: Progressing Towards Goal 
Note:  
Pt on RA. Pt still complaining on intermittent SOB, sats maintained above 90%. Pt having a cardioversion today. Dobutamine gtt continued. Bedside shift change report given to [facility] [place] (oncoming nurse) by Kelli Kendrick RN (offgoing nurse). Report included the following information SBAR, MAR, Accordion, Recent Results, Med Rec Status, Cardiac Rhythm NSR and Alarm Parameters .

## 2019-06-11 LAB
ALBUMIN SERPL-MCNC: 2.6 G/DL (ref 3.5–5)
ALBUMIN/GLOB SERPL: 0.7 {RATIO} (ref 1.1–2.2)
ALP SERPL-CCNC: 109 U/L (ref 45–117)
ALT SERPL-CCNC: 24 U/L (ref 12–78)
ANION GAP SERPL CALC-SCNC: 5 MMOL/L (ref 5–15)
AST SERPL-CCNC: 15 U/L (ref 15–37)
ATRIAL RATE: 70 BPM
BASOPHILS # BLD: 0 K/UL (ref 0–0.1)
BASOPHILS NFR BLD: 1 % (ref 0–1)
BILIRUB SERPL-MCNC: 0.7 MG/DL (ref 0.2–1)
BUN SERPL-MCNC: 39 MG/DL (ref 6–20)
BUN/CREAT SERPL: 21 (ref 12–20)
CALCIUM SERPL-MCNC: 8.7 MG/DL (ref 8.5–10.1)
CALCULATED P AXIS, ECG09: 19 DEGREES
CALCULATED R AXIS, ECG10: 4 DEGREES
CALCULATED T AXIS, ECG11: 132 DEGREES
CHLORIDE SERPL-SCNC: 102 MMOL/L (ref 97–108)
CO2 SERPL-SCNC: 29 MMOL/L (ref 21–32)
CREAT SERPL-MCNC: 1.89 MG/DL (ref 0.7–1.3)
DIAGNOSIS, 93000: NORMAL
DIFFERENTIAL METHOD BLD: ABNORMAL
EOSINOPHIL # BLD: 0.1 K/UL (ref 0–0.4)
EOSINOPHIL NFR BLD: 2 % (ref 0–7)
ERYTHROCYTE [DISTWIDTH] IN BLOOD BY AUTOMATED COUNT: 13.9 % (ref 11.5–14.5)
GLOBULIN SER CALC-MCNC: 3.8 G/DL (ref 2–4)
GLUCOSE SERPL-MCNC: 102 MG/DL (ref 65–100)
HCT VFR BLD AUTO: 32.1 % (ref 36.6–50.3)
HGB BLD-MCNC: 10.2 G/DL (ref 12.1–17)
IMM GRANULOCYTES # BLD AUTO: 0 K/UL (ref 0–0.04)
IMM GRANULOCYTES NFR BLD AUTO: 0 % (ref 0–0.5)
LYMPHOCYTES # BLD: 0.7 K/UL (ref 0.8–3.5)
LYMPHOCYTES NFR BLD: 14 % (ref 12–49)
MCH RBC QN AUTO: 30.1 PG (ref 26–34)
MCHC RBC AUTO-ENTMCNC: 31.8 G/DL (ref 30–36.5)
MCV RBC AUTO: 94.7 FL (ref 80–99)
MONOCYTES # BLD: 0.6 K/UL (ref 0–1)
MONOCYTES NFR BLD: 10 % (ref 5–13)
NEUTS SEG # BLD: 3.9 K/UL (ref 1.8–8)
NEUTS SEG NFR BLD: 73 % (ref 32–75)
NRBC # BLD: 0 K/UL (ref 0–0.01)
NRBC BLD-RTO: 0 PER 100 WBC
P-R INTERVAL, ECG05: 256 MS
PLATELET # BLD AUTO: 207 K/UL (ref 150–400)
PMV BLD AUTO: 9.3 FL (ref 8.9–12.9)
POTASSIUM SERPL-SCNC: 4.5 MMOL/L (ref 3.5–5.1)
PROT SERPL-MCNC: 6.4 G/DL (ref 6.4–8.2)
Q-T INTERVAL, ECG07: 444 MS
QRS DURATION, ECG06: 152 MS
QTC CALCULATION (BEZET), ECG08: 479 MS
RBC # BLD AUTO: 3.39 M/UL (ref 4.1–5.7)
SODIUM SERPL-SCNC: 136 MMOL/L (ref 136–145)
VENTRICULAR RATE, ECG03: 70 BPM
WBC # BLD AUTO: 5.4 K/UL (ref 4.1–11.1)

## 2019-06-11 PROCEDURE — 94660 CPAP INITIATION&MGMT: CPT

## 2019-06-11 PROCEDURE — 85025 COMPLETE CBC W/AUTO DIFF WBC: CPT

## 2019-06-11 PROCEDURE — 74011250636 HC RX REV CODE- 250/636: Performed by: INTERNAL MEDICINE

## 2019-06-11 PROCEDURE — 74011250637 HC RX REV CODE- 250/637: Performed by: INTERNAL MEDICINE

## 2019-06-11 PROCEDURE — 97116 GAIT TRAINING THERAPY: CPT

## 2019-06-11 PROCEDURE — 65660000001 HC RM ICU INTERMED STEPDOWN

## 2019-06-11 PROCEDURE — 74011000250 HC RX REV CODE- 250: Performed by: INTERNAL MEDICINE

## 2019-06-11 PROCEDURE — 80053 COMPREHEN METABOLIC PANEL: CPT

## 2019-06-11 PROCEDURE — 36415 COLL VENOUS BLD VENIPUNCTURE: CPT

## 2019-06-11 PROCEDURE — 74011000258 HC RX REV CODE- 258: Performed by: INTERNAL MEDICINE

## 2019-06-11 RX ADMIN — APIXABAN 5 MG: 5 TABLET, FILM COATED ORAL at 20:49

## 2019-06-11 RX ADMIN — BUMETANIDE 1 MG: 0.25 INJECTION INTRAMUSCULAR; INTRAVENOUS at 09:02

## 2019-06-11 RX ADMIN — BUMETANIDE 1 MG: 0.25 INJECTION INTRAMUSCULAR; INTRAVENOUS at 17:00

## 2019-06-11 RX ADMIN — CEFTRIAXONE 1 G: 1 INJECTION, POWDER, FOR SOLUTION INTRAMUSCULAR; INTRAVENOUS at 15:38

## 2019-06-11 RX ADMIN — LACTULOSE 45 ML: 20 SOLUTION ORAL at 09:02

## 2019-06-11 RX ADMIN — Medication 10 ML: at 06:00

## 2019-06-11 RX ADMIN — CARVEDILOL 12.5 MG: 12.5 TABLET, FILM COATED ORAL at 09:03

## 2019-06-11 RX ADMIN — Medication 10 ML: at 22:00

## 2019-06-11 RX ADMIN — AMIODARONE HYDROCHLORIDE 200 MG: 200 TABLET ORAL at 09:03

## 2019-06-11 RX ADMIN — APIXABAN 5 MG: 5 TABLET, FILM COATED ORAL at 09:03

## 2019-06-11 RX ADMIN — MILRINONE LACTATE IN DEXTROSE 0.38 MCG/KG/MIN: 200 INJECTION, SOLUTION INTRAVENOUS at 02:26

## 2019-06-11 RX ADMIN — Medication 10 ML: at 15:39

## 2019-06-11 RX ADMIN — ASPIRIN 81 MG: 81 TABLET ORAL at 09:03

## 2019-06-11 RX ADMIN — CARVEDILOL 12.5 MG: 12.5 TABLET, FILM COATED ORAL at 16:59

## 2019-06-11 RX ADMIN — AMIODARONE HYDROCHLORIDE 200 MG: 200 TABLET ORAL at 17:00

## 2019-06-11 RX ADMIN — MILRINONE LACTATE IN DEXTROSE 0.43 MCG/KG/MIN: 200 INJECTION, SOLUTION INTRAVENOUS at 17:06

## 2019-06-11 NOTE — CONSULTS
Requesting Provider: Nadya Acevedo MD - Reason for Consultation: [place] [facility] retention\" Pre-existing [facility] Patient:   No 
 
         
 
Patient: 72Douglas Ovalles MRN: [id number]  SSN: [id number] YOB: 1950  Age: 76 y.o. Sex: male Location: [facility]/ Code Status: Full Code PCP: Tisha Giron MD  - [phone] Emergency Contact:  Primary Emergency Contact: Merlyn Henriquez, Home Phone: [phone] Race/Hoahaoism/Language: WHITE OR  /  / Allayne Yuniel Payor: Payor: Aixa Velasco / Plan: [place] MEDICARE PART A & B / Product Type: Medicare /   
Prior Admission Data: 5/31/19 [facility] CARDIAC CATH LAB Sonia Goss Hospitalized:  Hospital Day: 12 - Admitted 5/31/2019  7:03 PM  
POD # 1 Day Post-Op Procedure(s): EP CARDIOVERSION by Sonia Goss MD - Blood Loss: * No values recorded between 6/10/2019  2:48 PM and 6/10/2019  3:08 PM * 0 Hr 20 Min 15 Sec CONSULTANTS 
IP CONSULT TO CARDIOLOGY 
IP CONSULT TO UROLOGY ADMISSION DIAGNOSES 
  ICD-10-CM ICD-9-CM 1. Acute congestive heart failure, unspecified heart failure type (HCC) I50.9 428.0 2. Acute pulmonary edema (HCC) J81.0 518.4 3. Dyspnea, unspecified type R06.00 786.09  
4. Congestive heart failure, unspecified HF chronicity, unspecified heart failure type (HCC) I50.9 428.0 Assessment/Plan: · Urinary retention · Gross hematuria · UTI 
 
-Continue neal. Manually irrigate PRN. -Okay to DC w/ neal. Neal education. 
-Continue with abx-Rocephin. 
-He will need outpatient OV for retention and prostate assessment ([phone]) CC: Irregular Heart Beat and Shortness of Breath HPI: He is a 76 y.o. male who was admitted for CHF exacerbation. PMHx of chronic systolic CHF, HTN, a-fib post cardioversion, 6/10/2019. Urology consulted for retention and gross hematuria after neal placement last evening. He cannot take flomax d/t hypotension.  Renal US on 6/5/2019 with normal findings. UCX positive for Enterobacter, on abx- Rocephin. Afebrile, VS stable. WBC-5.4. Hgb-10.2. BUN-39. Creat-1.89. Neal in place with clear, light pink-red urine. On eliquis and aspirin. He denies pain. Mild penis discomfort related to neal. Neal was irrigated with sterile water, no resistance met, input=output, tiny clots evacuated. 
  
 
 
Problem: retention; Location: bladder; Severity: moderate; Context: as above; Better/Worse: neal Temp (24hrs), Av.2 °F (36.8 °C), Min:98 °F (36.7 °C), Max:98.7 °F (37.1 °C) Urinary Status: Voiding, Hematuria Creatinine Date/Time Value Ref Range Status 2019 02:20 AM 1.89 (H) 0.70 - 1.30 MG/DL Final  
06/10/2019 05:41 AM 1.91 (H) 0.70 - 1.30 MG/DL Final  
2019 04:41 AM 1.88 (H) 0.70 - 1.30 MG/DL Final  
2019 03:59 AM 1.82 (H) 0.70 - 1.30 MG/DL Final  
2019 01:12 AM 2.10 (H) 0.70 - 1.30 MG/DL Final  
 
Current Antimicrobial Therapy (168h ago, onward) Ordered     Start Stop  
 19 1222  cefTRIAXone (ROCEPHIN) 1 g in 0.9% sodium chloride (MBP/ADV) 50 mL  1 g,   IntraVENous,   EVERY 24 HOURS    
 19 1600 19 1559 Key Anti-Platelet Anticoagulant Meds   
    
  
 aspirin delayed-release 81 mg tablet (Taking) Take 81 mg by mouth daily. apixaban (ELIQUIS) 5 mg tablet (Taking) Take 5 mg by mouth two (2) times a day. Diet: DIET CARDIAC Regular; 2 GM NA (House Low NA) - % Diet Eaten: 100 % Labs Lab Results Component Value Date/Time  Lactic acid 1.1 2019 04:09 AM  
 WBC 5.4 2019 02:20 AM  
 HCT 32.1 (L) 2019 02:20 AM  
 PLATELET 462  02:20 AM  
 Sodium 136 2019 02:20 AM  
 Potassium 4.5 2019 02:20 AM  
 Chloride 102 2019 02:20 AM  
 CO2 29 2019 02:20 AM  
 BUN 39 (H) 2019 02:20 AM  
 Creatinine 1.89 (H) 2019 02:20 AM  
 Glucose 102 (H) 2019 02:20 AM  
 Calcium 8.7 2019 02:20 AM  
 Magnesium 2.1 06/04/2019 04:09 AM  
 
UA:  
Lab Results Component Value Date/Time Color YELLOW/STRAW 06/04/2019 04:27 AM  
 Appearance CLOUDY (A) 06/04/2019 04:27 AM  
 Specific gravity 1.016 06/04/2019 04:27 AM  
 pH (UA) 5.5 06/04/2019 04:27 AM  
 Protein 30 (A) 06/04/2019 04:27 AM  
 Glucose NEGATIVE  06/04/2019 04:27 AM  
 Ketone NEGATIVE  06/04/2019 04:27 AM  
 Bilirubin NEGATIVE  06/04/2019 04:27 AM  
 Urobilinogen 1.0 06/04/2019 04:27 AM  
 Nitrites POSITIVE (A) 06/04/2019 04:27 AM  
 Leukocyte Esterase LARGE (A) 06/04/2019 04:27 AM  
 Epithelial cells FEW 06/04/2019 04:27 AM  
 Bacteria NEGATIVE  06/04/2019 04:27 AM  
 WBC >100 (H) 06/04/2019 04:27 AM  
 RBC 10-20 06/04/2019 04:27 AM  
 
Imaging Results for orders placed during the hospital encounter of 05/31/19 CTA CHEST W OR W WO CONT Narrative EXAM:  CTA CHEST W OR W WO CONT INDICATION:   sob x 3 months/ cardioverted this am by Dr Tia Montana COMPARISON: None. CONTRAST:  80 mL of Isovue-370. TECHNIQUE:  
Precontrast  images were obtained to localize the volume for acquisition. Multislice helical CT arteriography was performed from the diaphragm to the 
thoracic inlet during uneventful rapid bolus intravenous contrast 
administration. Lung and soft tissue windows were generated. Coronal and 
sagittal images were generated and 3D post processing consisting of coronal 
maximum intensity images was performed. CT dose reduction was achieved through 
use of a standardized protocol tailored for this examination and automatic 
exposure control for dose modulation. FINDINGS: 
LUNGS:  There are Kerley B lines noted along the periphery indicating mild 
interstitial edema. Sj [place] PLEURA: Small bilateral pleural effusions noted. TRACHEA/BRONCHI: Patent. PULMONARY ARTERIES: The pulmonary arteries are well enhanced and no pulmonary 
emboli are identified.  
 
MEDIASTINUM/LUCERO: There are prominent and also mildly enlarged lymph nodes in 
 the precarinal and subcarinal.. AORTA: The aorta enhances normally without evidence of aneurysm or dissection. UPPER ABDOMEN: The visualized portions of the upper abdominal organs are normal. 
 
BONES: No sclerotic or lytic lesion. Impression IMPRESSION:  
1. No evidence of pulmonary embolus. 2. Interstitial edema and small bilateral pleural effusions. 3. Mild mediastinal adenopathy. Alexx [place] US Results (most recent): 
Results from [place] encounter on 05/31/19 US RETROPERITONEUM COMP Narrative Indication: Acute renal failure with urinary retention Realtime sonographic imaging of the retroperitoneum was performed. The right 
kidney measures 10.6 cm and the left kidney measures 11.4 cm. They are normal in 
size and appearance without evidence of mass lesion, hydronephrosis, or 
calcification. The bladder is unremarkable as is the visualized portion of the 
abdominal aorta and IVC. The common iliac bifurcation is not visualized due to 
bowel gas. Impression IMPRESSION: Normal renal ultrasound. Cultures All Micro Results Procedure Component Value Units Date/Time CULTURE, BLOOD, PAIRED [[id number]] Collected:  06/04/19 0409 Order Status:  Completed Specimen:  Blood Updated:  06/09/19 0539 Special Requests: NO SPECIAL REQUESTS Culture result: NO GROWTH 5 DAYS     
 CULTURE, URINE [[id number]]  (Abnormal)  (Susceptibility) Collected:  06/04/19 0427 Order Status:  Completed Specimen:  Urine Updated:  06/06/19 1150 Special Requests: --     
  NO SPECIAL REQUESTS Reflexed from [id number] [place] Count --     
  >100,000 COLONIES/mL Culture result: ENTEROBACTER CLOACAE Past History: (Complete 2+/3 areas) No Known Allergies Current Facility-Administered Medications Medication Dose Route Frequency  lactulose (CHRONULAC) 10 gram/15 mL solution 45 mL  45 mL Oral DAILY  bumetanide (BUMEX) injection 1 mg  1 mg IntraVENous BID  milrinone (PRIMACOR) 20 MG/100 ML D5W infusion  0.43 mcg/kg/min IntraVENous CONTINUOUS  
 melatonin tablet 3 mg  3 mg Oral QHS PRN  
 amiodarone (CORDARONE) tablet 200 mg  200 mg Oral BID  cefTRIAXone (ROCEPHIN) 1 g in 0.9% sodium chloride (MBP/ADV) 50 mL  1 g IntraVENous Q24H  
 apixaban (ELIQUIS) tablet 5 mg  5 mg Oral Q12H  aspirin delayed-release tablet 81 mg  81 mg Oral DAILY  carvedilol (COREG) tablet 12.5 mg  12.5 mg Oral BID WITH MEALS  sodium chloride (NS) flush 5-40 mL  5-40 mL IntraVENous Q8H  
 sodium chloride (NS) flush 5-40 mL  5-40 mL IntraVENous PRN  
 ondansetron (ZOFRAN) injection 4 mg  4 mg IntraVENous Q4H PRN  
 bisacodyl (DULCOLAX) tablet 5 mg  5 mg Oral DAILY PRN  
 acetaminophen (TYLENOL) tablet 650 mg  650 mg Oral Q4H PRN  
 morphine injection 2 mg  2 mg IntraVENous Q4H PRN Prior to Admission medications Medication Sig Start Date End Date Taking? Authorizing Provider  
furosemide (LASIX) 80 mg tablet Take 80 mg by mouth daily. Yes Provider, Historical  
bumetanide (BUMEX) 2 mg tablet Take 2 mg by mouth daily. Yes Provider, Historical  
amiodarone (CORDARONE) 200 mg tablet Take 200 mg by mouth daily. Yes Provider, Historical  
aspirin delayed-release 81 mg tablet Take 81 mg by mouth daily. Yes Provider, Historical  
carvedilol (COREG) 12.5 mg tablet Take 12.5 mg by mouth two (2) times daily (with meals). Yes Provider, Historical  
apixaban (ELIQUIS) 5 mg tablet Take 5 mg by mouth two (2) times a day. Yes Provider, Historical  
sacubitril-valsartan (ENTRESTO) 24 mg/26 mg tablet Take 1 Tab by mouth two (2) times a day. Yes Provider, Historical  
glucosamine/chondr vyas A sod (OSTEO BI-FLEX PO) Take 1 Tab by mouth two (2) times a day. Yes Provider, Historical  
evolocumab (REPATHA SURECLICK) pen injection 314 mg by SubCUTAneous route every fourteen (14) days.    Yes Provider, Historical  
 spironolactone (ALDACTONE) 25 mg tablet Take 25 mg by mouth daily. Yes Provider, Historical  
  
 
PMHx:  has a past medical history of Degenerative disc disease, lumbar, Heart failure ([facility] [address] High cholesterol, Hypertension, Paroxysmal atrial fibrillation ([facility] [address] (4/2/2019), and Spinal stenosis. PSurgHx:  has a past surgical history that includes hx coronary artery bypass graft; hx appendectomy; hx hernia repair; hx implantable cardioverter defibrillator; and pr cardioversion elective arrhythmia external (N/A, 6/10/2019). PSocHx:  reports that he quit smoking about 8 years ago. He has never used smokeless tobacco. He reports that he drinks alcohol. He reports that he does not use drugs. ROS:  (Complete - 10 systems) - POSITIVES IN RED: Weightloss (Constitutional), Dry mouth (ENMT), Chest pain (CV), SOB (Respiratory), Constipation (GI), Weakness (MS), Pallor (Skin), TIA Sx (Neuro), Confusion (Psych), Easy bruising (Heme), gross hematuria () Physical Exam: (Comprehesive - 8+ 1995 Systems)  
 
(1) Constitutional:  FIO2: FIO2 (%): 40 % on SpO2: O2 Sat (%): 98 % O2 Device: Nasal cannula O2 Flow Rate (L/min): 4 l/min Patient Vitals for the past 24 hrs: 
 BP Temp Pulse Resp SpO2 Weight  
06/11/19 1101 105/64 98.7 °F (37.1 °C) 88 18 98 %   
06/11/19 0902 124/75  97     
06/11/19 0749 97/58 98.1 °F (36.7 °C) 93 18 92 %   
06/11/19 0325 99/57 98.2 °F (36.8 °C) 79 20 93 % 100 kg (220 lb 6.4 oz) 06/11/19 0226 128/72  87     
06/11/19 0038     100 %   
06/10/19 2308 106/71 98 °F (36.7 °C) 73 21 100 %   
06/10/19 2247     100 %   
06/10/19 1903 110/73 98.1 °F (36.7 °C) 83 20 95 %   
06/10/19 1800 130/87  86 22 100 %   
06/10/19 1701 145/86       
06/10/19 1631 125/87 98 °F (36.7 °C) 78 26 92 %   
06/10/19 1601 137/85  84 28 100 %   
06/10/19 1546 134/83  78 25 100 %   
06/10/19 1531 129/77  74 23 100 %   
06/10/19 1515 122/76  70 26 99 %   
06/10/19 1513 110/70  70 18 96 %   
 06/10/19 1504 111/72  68 16 94 %  Date 06/10/19 0700 - 06/11/19 3291 06/11/19 0700 - 06/12/19 8373 Shift 8457-1788 5218-9004 24 Hour Total 5329-7035 6992-9702 24 Hour Total  
INTAKE  
P.O. 380  380 480  480  
  P. O. 380  380 480  480  
I. V.(mL/kg/hr) 1567.8(1.3)  1567.8(0.7) 151.2  151.2 DOBUTamine Volume 1517.8  1517.8 Milrinone Volume    151.2  151.2 Volume (cefTRIAXone (ROCEPHIN) 1 g in 0.9% sodium chloride (MBP/ADV) 50 mL) 50  50 Shift Total(mL/kg) 1947.8(19.4)  1947.8(19.5) 631. 2(6.3)  631. 2(6.3) OUTPUT Urine(mL/kg/hr) 825(0.7) 1025(0.9) 1850(0.8) 1450  1450 Urine Voided 825  825 450  450 Urine Output (mL) (Urinary Catheter 06/10/19 Neal)  1025 1025 1000  1000 Shift Total(mL/kg) 825(8.2) 1025(10.3) 1850(18.5) 1450(14.5)  1450(14.5) NET 1122.8 -1025 97.8 -818.8  -818.8 Weight (kg) 100.3 100 100 100 100 100  
  
(2) ENMT:   moist mucous membranes, no eye/ear discharge (3) Respiratory:  SOB, on O2 4L. O2-98%  
(4) GI:  no abdominal masses, tenderness  
(5) :   Neal in place, draining clear, light pink-red urine. (6) Lymphatic:  no adenopathy. Neck supple  
(7) Muscloskeletal:  no gross deformity, walking from bed to bathroom. (8) Skin:  Warm & dry  
(9) Neuro:  no focal deficits, no speech deficits Signed By: Mainor Paniagua NP  - June 11, 2019 Retention  - required ISC and now indwelling. Cannot handle flomax due to hypotension. Bladder irrigated clear. Plan: 
- may have to be d/khushboo home with neal. Needs outpatient follow-up with Urology. Florinda Franks will check on him tomorrow as well. Please call with questions

## 2019-06-11 NOTE — PROGRESS NOTES
Hospitalist Progress Note Raquel Garcia MD 
Please call  and page for questions. Call physician on-call through the  7pm-7am 
 
Daily Progress Note: 6/11/2019 Primary care Delmer Denney MD 
 
Date of admission: 5/31/2019  7:03 PM 
 
Admission summery and hospital course: 
80-year-old man with past medical history significant for chronic systolic congestive heart failure, dyslipidemia, hypertension, atrial fibrillation status post cardioversion, was in his usual state of health until the day of presentation at the emergency room when the patient developed worsening of his shortness of breath.  The inpatient underwent cardioversion early this morning by his cardiologist. 
Kae Downey started on 06/05. 
  
Subjective:  
 
6/11/2019 : yet sob and PND< case discussed with card this am. Cont on pressors iv Assessment/Plan:  
Acute-on-chronic systolic congestive heart failure NYHA 2-3  
JACQUE on 05/31 with EF of 21%-25%.      
Entresto hold for low BP for increased creatinine. Continue diuresis, amiodarone, Bumex, coreg, IV dobutamin and O2 as needed. CTA of the chest is negative for pulmonary embolism.  Appreciated Dr Nielson/Dr Olvera input. Aldactone on hold now.   
Patient needs 6 minutes walk prior to discharge.  
Will continue to follow cardiology recommendations. conton milronone qtts 
  
Atrial fibrillation, status post cardioversion.   
Pt still in Afib. Will undergo cardioversion today as per cardiology. Meanwhile continue amiodarone, coreg. On Eliquis for anticoagulation. On Dobutamine.  
  
Hypertension. Hold On entresto. BP is reasonable now.  
BP Readings from Last 1 Encounters:  
06/11/19 124/75  
  
  
Acute kidney injury.   
Creatinine is improving. Most likely as a result of the diuretic therapy and or entresto.  Renal US on 06/05 was normal.  
Lab Results Component Value Date/Time  Creatinine 1.89 (H) 06/11/2019 02:20 AM  
  
 
 UTI: Tmax as 100 on  PM. Afebrile since than. Continue to monitor with Rocephin.  
Ucx c/w Enterobacter Cloacae Insomnia: Will increase the melatonin.  
  
See orders for other plans. VTE prophylaxis: On Eliquis.  
Code status: Full Discussed plan of care with Patient/Family and Nurse. Patient wife is at the bedside.  
Pre-admission lived at home. Discharge planning: pending Review of Systems:  
 
Review of Systems: 
Symptom  Y/N  Comments   Symptom  Y/N  Comments Fever/Chills   n   Chest Pain  n   
Poor Appetite  n    Edema  n    
Cough  y   Abdominal Pain  n    
Sputum  n   Joint Pain SOB/DOUGLAS  y   Pruritis/Rash Nausea/vomit  n   Tolerating PT/OT Diarrhea     Tolerating Diet Constipation     Other Could not obtain due to:    
 
 
Objective:  
Physical Exam:  
 
Visit Vitals /75 Pulse 97 Temp 98.1 °F (36.7 °C) Resp 18 Wt 100 kg (220 lb 6.4 oz) SpO2 92% BMI 28.30 kg/m² O2 Flow Rate (L/min): 4 l/min O2 Device: Nasal cannula Temp (24hrs), Av.1 °F (36.7 °C), Min:98 °F (36.7 °C), Max:98.4 °F (36.9 °C) 
  701 - 1900 In: -  
Out: 450 [Urine:450]   1901 - 700 In: 2099 [P.O.:380; I.V.:1719] Out: 9454 [Urine:2575] Stable exam.2019 General:  Alert, cooperative, no distress, appears stated age. Lungs:   Crackles in the bases. Heart:  Regular rate and rhythm, S1, S2 normal, no murmur.   
Abdomen:   Soft, non-tender. Bowel sounds normal.   
Extremities: Extremities normal, atraumatic, no cyanosis. Edema at LEs. Skin: Skin color, texture, turgor normal. No rashes or lesions Neurologic: Patient was SOB while he walked slowly at the hallway. Patient interacts well. Patient voice is clear.   
  
Data Review:  
   
Recent Days: 
Recent Labs  
  19 WBC 5.4 HGB 10.2* HCT 32.1*  
 Recent Labs  
  19 
0220 06/10/19 
0541 19 
0441  135* 135* K 4.5 4.4 4.4  101 102 CO2 29 27 27 * 100 99 BUN 39* 37* 37* CREA 1.89* 1.91* 1.88* CA 8.7 8.7 8.8 ALB 2.6*  --   --   
SGOT 15  --   --   
ALT 24  --   -- No results for input(s): PH, PCO2, PO2, HCO3, FIO2 in the last 72 hours. 24 Hour Results: 
Recent Results (from the past 24 hour(s)) EKG, 12 LEAD, INITIAL Collection Time: 06/10/19  3:12 PM  
Result Value Ref Range Ventricular Rate 70 BPM  
 Atrial Rate 70 BPM  
 P-R Interval 256 ms QRS Duration 152 ms Q-T Interval 444 ms QTC Calculation (Bezet) 479 ms Calculated P Axis 19 degrees Calculated R Axis 4 degrees Calculated T Axis 132 degrees Diagnosis Sinus rhythm with 1st degree AV block Left bundle branch block When compared with ECG of 31-MAY-2019 19:05, 
premature supraventricular complexes are no longer present QRS axis shifted left T wave inversion no longer evident in Inferior leads Confirmed by Lisandro Fernandez M.D., [facility] ([id number]) on 6/11/2019 8:41:03 AM 
  
CBC WITH AUTOMATED DIFF Collection Time: 06/11/19  2:20 AM  
Result Value Ref Range WBC 5.4 4.1 - 11.1 K/uL  
 RBC 3.39 (L) 4.10 - 5.70 M/uL  
 HGB 10.2 (L) 12.1 - 17.0 g/dL HCT 32.1 (L) 36.6 - 50.3 % MCV 94.7 80.0 - 99.0 FL  
 MCH 30.1 26.0 - 34.0 PG  
 MCHC 31.8 30.0 - 36.5 g/dL  
 RDW 13.9 11.5 - 14.5 % PLATELET 523 388 - 133 K/uL MPV 9.3 8.9 - 12.9 FL  
 NRBC 0.0 0  WBC ABSOLUTE NRBC 0.00 0.00 - 0.01 K/uL NEUTROPHILS 73 32 - 75 % LYMPHOCYTES 14 12 - 49 % MONOCYTES 10 5 - 13 % EOSINOPHILS 2 0 - 7 % BASOPHILS 1 0 - 1 % IMMATURE GRANULOCYTES 0 0.0 - 0.5 % ABS. NEUTROPHILS 3.9 1.8 - 8.0 K/UL  
 ABS. LYMPHOCYTES 0.7 (L) 0.8 - 3.5 K/UL  
 ABS. MONOCYTES 0.6 0.0 - 1.0 K/UL  
 ABS. EOSINOPHILS 0.1 0.0 - 0.4 K/UL  
 ABS. BASOPHILS 0.0 0.0 - 0.1 K/UL  
 ABS. IMM. GRANS. 0.0 0.00 - 0.04 K/UL  
 DF AUTOMATED METABOLIC PANEL, COMPREHENSIVE Collection Time: 06/11/19  2:20 AM  
Result Value Ref Range Sodium 136 136 - 145 mmol/L Potassium 4.5 3.5 - 5.1 mmol/L Chloride 102 97 - 108 mmol/L  
 CO2 29 21 - 32 mmol/L Anion gap 5 5 - 15 mmol/L Glucose 102 (H) 65 - 100 mg/dL BUN 39 (H) 6 - 20 MG/DL Creatinine 1.89 (H) 0.70 - 1.30 MG/DL  
 BUN/Creatinine ratio 21 (H) 12 - 20 GFR est AA 43 (L) >60 ml/min/1.73m2 GFR est non-AA 36 (L) >60 ml/min/1.73m2 Calcium 8.7 8.5 - 10.1 MG/DL Bilirubin, total 0.7 0.2 - 1.0 MG/DL  
 ALT (SGPT) 24 12 - 78 U/L  
 AST (SGOT) 15 15 - 37 U/L Alk. phosphatase 109 45 - 117 U/L Protein, total 6.4 6.4 - 8.2 g/dL Albumin 2.6 (L) 3.5 - 5.0 g/dL Globulin 3.8 2.0 - 4.0 g/dL A-G Ratio 0.7 (L) 1.1 - 2.2 Problem List: 
Problem List as of 6/11/2019 Date Reviewed: 6/10/2019 Codes Class Noted - Resolved * (Principal) Acute on chronic systolic CHF (congestive heart failure) (HCC) ICD-10-CM: U68.22 ICD-9-CM: 428.23, 428.0  5/31/2019 - Present Paroxysmal atrial fibrillation (HCC) ICD-10-CM: I48.0 ICD-9-CM: 427.31  4/2/2019 - Present Medications reviewed Current Facility-Administered Medications Medication Dose Route Frequency  lactulose (CHRONULAC) 10 gram/15 mL solution 45 mL  45 mL Oral DAILY  bumetanide (BUMEX) injection 1 mg  1 mg IntraVENous BID  milrinone (PRIMACOR) 20 MG/100 ML D5W infusion  0.43 mcg/kg/min IntraVENous CONTINUOUS  
 melatonin tablet 3 mg  3 mg Oral QHS PRN  
 amiodarone (CORDARONE) tablet 200 mg  200 mg Oral BID  cefTRIAXone (ROCEPHIN) 1 g in 0.9% sodium chloride (MBP/ADV) 50 mL  1 g IntraVENous Q24H  
 apixaban (ELIQUIS) tablet 5 mg  5 mg Oral Q12H  aspirin delayed-release tablet 81 mg  81 mg Oral DAILY  carvedilol (COREG) tablet 12.5 mg  12.5 mg Oral BID WITH MEALS  sodium chloride (NS) flush 5-40 mL  5-40 mL IntraVENous Q8H  
 sodium chloride (NS) flush 5-40 mL  5-40 mL IntraVENous PRN  
 ondansetron (ZOFRAN) injection 4 mg  4 mg IntraVENous Q4H PRN  
  bisacodyl (DULCOLAX) tablet 5 mg  5 mg Oral DAILY PRN  
 acetaminophen (TYLENOL) tablet 650 mg  650 mg Oral Q4H PRN  
 morphine injection 2 mg  2 mg IntraVENous Q4H PRN Care Plan discussed with: Patient/Family and Nurse Total time spent with patient: 30 minutes.  
 
Codie Ji MD

## 2019-06-11 NOTE — PROGRESS NOTES
Problem: Falls - Risk of 
Goal: *Absence of Falls Description Document Giuliana Reyes Fall Risk and appropriate interventions in the flowsheet. Outcome: Progressing Towards Goal 
Pt remains free of falls during admission. Call bell and frequently used items within reach. Bedside table within reach. Patient provided non skid socks and instructed to call out for nurse when in need of assistance. Problem: Patient Education: Go to Patient Education Activity Goal: Patient/Family Education Outcome: Progressing Towards Goal 
 
 Pt educated on fall prevention. Pt demonstrates appropriate understanding. Pt is oriented and calls out appropriately for assistance ambulating. Purposeful hourly rounds initiated by staff. Problem: Heart Failure: Day 5 Goal: Treatments/Interventions/Procedures Outcome: Progressing Towards Goal 
Discussed weight with patient Last 3 Recorded Weights in this Encounter 06/09/19 0952 06/10/19 0421 06/11/19 7607 Weight: 98.2 kg (216 lb 7.9 oz) 100.3 kg (221 lb 1.9 oz) 100 kg (220 lb 6.4 oz) Dr aware of weight gain. Will notify oncoming nurse.

## 2019-06-11 NOTE — PROGRESS NOTES
[facility] Progress Note                                        [facility]: 5[place]/[place] 
  
  
Pt reports that he is feeling a little better; still needs bipap at night to sleep; Required neal for retention yesterday; now with hematuria due to trauma and likely BPH 
  
Assessment/Plan: # Acute on chronic systolic HF -EF 43% 
- improved initially  with diuretics, but creat increased and BP dropped; changed dobutamine to milrinone yesterday as BP had increased; increase bumex;  
-compression socks -Bipap for home? no desat can be documented to help with PND 
  
# AF -reverted to afib, cont eliquis and amiodarone; in aflutter, CV yesterday and still in NSR 
- Consider ablation in future.  
  
# LBBB - he has progressed recently with LBBB. Will upgrade to BiV ICD/pacer in future-will need to hold off in light of UTI. 
  
# Acute on chronic kidney injury-renal US normal; stabilized, though higher than his baseline; also has some retention and flomax caused hypotension; UO good. Will increase diuresis; if renal function stable tomorrow will add spironolactone 
  
# fever- urine culture positive with gram-rods; on rocephin-last day today Exam: 
Blood pressure 97/58, pulse 93, temperature 98.1 °F (36.7 °C), resp. rate 18, weight 100 kg (220 lb 6.4 oz), SpO2 92 %. Rales in L base Cor reg with S3 Ext with 2+ edema Labs: 
Cr 1.89 
K 4.5 Hanny Cheatham MD

## 2019-06-11 NOTE — PROGRESS NOTES
1930: Bedside shift change report given to Fidelina Kaminski RN (oncoming nurse) by Fabian Snow RN (offgoing nurse). Report included the following information SBAR, Kardex, Intake/Output, MAR, Recent Results and Cardiac Rhythm NSR w/1 Degree AVB, BBB. Problem: Heart Failure: Day 5 Goal: Medications Outcome: Progressing Towards Goal 
Note:  
Pt on milrinone drip. Receiving bumex, coreg, and amiodarone. Pt tolerating well. Will continue to monitor. Goal: Treatments/Interventions/Procedures Outcome: Progressing Towards Goal 
Note:  
Pt had cardioversion yesterday. Pt is NSR with 1 degree AVB. Problem: Heart Failure: Day 5 Goal: Respiratory Outcome: Not Progressing Towards Goal 
Note:  
Pt on 4L NC. Maintaining SpO2 >90%. Dyspneic on exertion and drops to mid 80s on room air on exertion. Will continue to monitor. Problem: Urinary Tract Infection - Adult Goal: *Absence of infection signs and symptoms Outcome: Not Progressing Towards Goal 
Note:  
Pt on IV rocephin. Pt experiencing urinary retention. Lizama placed last night with hematuria. Urology consulted.

## 2019-06-11 NOTE — PROGRESS NOTES
Problem: Heart Failure: Day 1 Goal: Medications Outcome: Progressing Towards Goal 
Note:  
Continuing on milrinone Problem: Heart Failure: Day 2 Goal: Respiratory Outcome: Progressing Towards Goal 
Note:  
Patient placed on BIPAP while sleeping. Oxygen saturations remained above 92% Problem: Falls - Risk of 
Goal: *Absence of Falls Description Document Arti Beady Fall Risk and appropriate interventions in the flowsheet. Outcome: Progressing Towards Goal 
 
 Pt remains free of falls during admission. Call bell and frequently used items within reach. Bedside table within reach. Patient provided non skid socks and instructed to call out for nurse when in need of assistance. Problem: Patient Education: Go to Patient Education Activity Goal: Patient/Family Education Outcome: Progressing Towards Goal 
 
 Pt educated on fall prevention. Pt demonstrates appropriate understanding. Pt is oriented and calls out appropriately for assistance ambulating. Purposeful hourly rounds initiated by staff. Last 3 Recorded Weights in this Encounter 06/09/19 5727 06/10/19 0421 06/11/19 5600 Weight: 98.2 kg (216 lb 7.9 oz) 100.3 kg (221 lb 1.9 oz) 100 kg (220 lb 6.4 oz) Dayshift from 6/10 stated that doctor aware of weight variance. Bedside and Verbal shift change report given to [address] (oncoming nurse) by Sonu Irizarry (offgoing nurse). Report included the following information SBAR, Kardex and ED Summary.

## 2019-06-11 NOTE — PROGRESS NOTES
Problem: Mobility Impaired (Adult and Pediatric) Goal: *Acute Goals and Plan of Care (Insert Text) Description Physical Therapy Goals Initiated 6/7/2019 1. Patient will ambulate with modified independence for 400 feet with the least restrictive device within 7 day(s). Outcome: Progressing Towards Goal 
 PHYSICAL THERAPY TREATMENT Patient: Zay Wheeler (77 y.o. male) Date: 6/11/2019 Diagnosis: Acute on chronic systolic CHF (congestive heart failure) ([facility]) [I50.23] Acute on chronic systolic CHF (congestive heart failure) ([facility] [address] Procedure(s) (LRB): 
EP CARDIOVERSION (N/A) 1 Day Post-Op Precautions:   
Chart, physical therapy assessment, plan of care and goals were reviewed. ASSESSMENT: 
Pt received supine in bed. Agreeable to therapy but concerned by new lines and catheter placed yesterday. Reassured he can still mobilize and pt quickly moved to EOB without difficulty. Pt underwent cardioversion yesterday and reports no improved in SOB. Noted pt is now on 4L/min(RA yesterday). Removed NC and saturations stable at 97%. Pt was able to ambulate 200ft in total with 5 rest breaks(1 yesterday). Pt is desaturate as low as 84% on RA and unable to recover to 90% despite prolonged rest break and PLBing. Placed back on 4L/min with saturations recovering with seated break. Overall pt continues to move well however vitals worse. Failed walking oximetry test today. Pulse Oximetry Assessment 94% at rest on room air 84% while ambulating on room air 97% at rest on 4LPM 
92% while ambulating on 4LPM 
 
 
Progression toward goals: 
?    Improving appropriately and progressing toward goals ? Improving slowly and progressing toward goals ? Not making progress toward goals and plan of care will be adjusted PLAN: 
Patient continues to benefit from skilled intervention to address the above impairments. Continue treatment per established plan of care. Discharge Recommendations:  Home Health Further Equipment Recommendations for Discharge:  possibly portable O2 SUBJECTIVE:  
Patient stated ? I want to walk but I don;t know what to do with the catheter. ? OBJECTIVE DATA SUMMARY:  
Critical Behavior: 
Neurologic State: Alert Orientation Level: Oriented X4 Cognition: Appropriate decision making, Appropriate for age attention/concentration, Appropriate safety awareness, Follows commands Functional Mobility Training: 
Bed Mobility: 
  
  
  
  
  
  
Transfers: 
Sit to Stand: Modified independent Stand to Sit: Independent Balance: 
Sitting: Intact Standing: Impaired Standing - Static: Fair Standing - Dynamic : Fair Ambulation/Gait Training: 
Distance (ft): 200 Feet (ft)(5 standing rest breaks) Assistive Device: Gait belt;Cane, straight Ambulation - Level of Assistance: Contact guard assistance Gait Abnormalities: Decreased step clearance; Path deviations Activity Tolerance:  
Fair-Desaturating Please refer to the flowsheet for vital signs taken during this treatment. After treatment:  
?    Patient left in no apparent distress sitting up in chair ? Patient left in no apparent distress in bed 
? Call bell left within reach ? Nursing notified ? Caregiver present ? Bed alarm activated COMMUNICATION/COLLABORATION:  
The patient?s plan of care was discussed with: Registered Nurse Antwon Shanks, PT Time Calculation: 16 mins

## 2019-06-12 LAB
ANION GAP SERPL CALC-SCNC: 7 MMOL/L (ref 5–15)
BUN SERPL-MCNC: 32 MG/DL (ref 6–20)
BUN/CREAT SERPL: 18 (ref 12–20)
CALCIUM SERPL-MCNC: 8.9 MG/DL (ref 8.5–10.1)
CHLORIDE SERPL-SCNC: 97 MMOL/L (ref 97–108)
CO2 SERPL-SCNC: 29 MMOL/L (ref 21–32)
CREAT SERPL-MCNC: 1.82 MG/DL (ref 0.7–1.3)
GLUCOSE SERPL-MCNC: 134 MG/DL (ref 65–100)
POTASSIUM SERPL-SCNC: 3.6 MMOL/L (ref 3.5–5.1)
SODIUM SERPL-SCNC: 133 MMOL/L (ref 136–145)

## 2019-06-12 PROCEDURE — 80048 BASIC METABOLIC PNL TOTAL CA: CPT

## 2019-06-12 PROCEDURE — 74011250637 HC RX REV CODE- 250/637: Performed by: INTERNAL MEDICINE

## 2019-06-12 PROCEDURE — 65660000001 HC RM ICU INTERMED STEPDOWN

## 2019-06-12 PROCEDURE — 74011250636 HC RX REV CODE- 250/636: Performed by: INTERNAL MEDICINE

## 2019-06-12 PROCEDURE — 93005 ELECTROCARDIOGRAM TRACING: CPT

## 2019-06-12 PROCEDURE — 74011000250 HC RX REV CODE- 250: Performed by: INTERNAL MEDICINE

## 2019-06-12 PROCEDURE — 36415 COLL VENOUS BLD VENIPUNCTURE: CPT

## 2019-06-12 PROCEDURE — 97116 GAIT TRAINING THERAPY: CPT

## 2019-06-12 RX ORDER — SPIRONOLACTONE 25 MG/1
25 TABLET ORAL DAILY
Status: DISCONTINUED | OUTPATIENT
Start: 2019-06-12 | End: 2019-06-26 | Stop reason: HOSPADM

## 2019-06-12 RX ADMIN — APIXABAN 5 MG: 5 TABLET, FILM COATED ORAL at 08:49

## 2019-06-12 RX ADMIN — MILRINONE LACTATE IN DEXTROSE 0.43 MCG/KG/MIN: 200 INJECTION, SOLUTION INTRAVENOUS at 08:51

## 2019-06-12 RX ADMIN — SPIRONOLACTONE 25 MG: 25 TABLET ORAL at 08:49

## 2019-06-12 RX ADMIN — AMIODARONE HYDROCHLORIDE 200 MG: 200 TABLET ORAL at 08:49

## 2019-06-12 RX ADMIN — Medication 10 ML: at 14:11

## 2019-06-12 RX ADMIN — ASPIRIN 81 MG: 81 TABLET ORAL at 08:49

## 2019-06-12 RX ADMIN — CARVEDILOL 12.5 MG: 12.5 TABLET, FILM COATED ORAL at 16:07

## 2019-06-12 RX ADMIN — MILRINONE LACTATE IN DEXTROSE 0.43 MCG/KG/MIN: 200 INJECTION, SOLUTION INTRAVENOUS at 01:02

## 2019-06-12 RX ADMIN — Medication 10 ML: at 06:00

## 2019-06-12 RX ADMIN — BUMETANIDE 1 MG: 0.25 INJECTION INTRAMUSCULAR; INTRAVENOUS at 08:49

## 2019-06-12 RX ADMIN — CARVEDILOL 12.5 MG: 12.5 TABLET, FILM COATED ORAL at 08:49

## 2019-06-12 RX ADMIN — AMIODARONE HYDROCHLORIDE 200 MG: 200 TABLET ORAL at 18:09

## 2019-06-12 RX ADMIN — Medication 10 ML: at 21:36

## 2019-06-12 RX ADMIN — MILRINONE LACTATE IN DEXTROSE 0.43 MCG/KG/MIN: 200 INJECTION, SOLUTION INTRAVENOUS at 23:32

## 2019-06-12 RX ADMIN — APIXABAN 5 MG: 5 TABLET, FILM COATED ORAL at 21:36

## 2019-06-12 RX ADMIN — MILRINONE LACTATE IN DEXTROSE 0.43 MCG/KG/MIN: 200 INJECTION, SOLUTION INTRAVENOUS at 16:07

## 2019-06-12 RX ADMIN — BUMETANIDE 1 MG: 0.25 INJECTION INTRAMUSCULAR; INTRAVENOUS at 18:09

## 2019-06-12 NOTE — PROGRESS NOTES
Caridad Moralez MD  
Physician  
Cardiology Progress Notes Signed Date of Service:  06/11/19 8901 []Hide copied text []Huseyin for details 
 
 
 VCS Cardiology Progress Note                                        [facility] Date: 5/31/2019 
  
  
Pt reports that he is feeling a little better; still needs bipap at night to sleep; Required neal for retention yesterday; now with hematuria due to trauma and likely BPH 
  
Assessment/Plan: # Acute on chronic systolic HF -EF 11% 
- improved initially  with diuretics, but creat increased and BP dropped; changed dobutamine to milrinone monday as BP had increased; increased bumex; readded spironolactone; desat with PT on walk yesterday still requiring Bipap to sleep 
-Will ask advanced heart failure to see for further suggestions -Bipap for home? 
  
# AF -reverted to afib, cont eliquis and amiodarone; in aflutter, CV monday and still in NSR but HR elevated. May need trial of corlanor - Consider ablation in future.  
  
# LBBB - he has progressed recently with LBBB. Will plan upgrade to BiV ICD/pacer in future-will need to hold off in light of UTI. 
  
# Acute on chronic kidney injury-renal US normal; stabilized, though higher than his baseline; also has some retention and flomax caused hypotension; UO good. Will increase diuresis; if renal function stable tomorrow will add spironolactone 
  
# fever- urine culture positive with gram-rods; on rocephin-last day today; low gr temp this am 
 
#urinary retention-urology suggests home with neal; has some hematuria which appears mild Difficult situation; he has truly not improved despite improving renal function, restoring sinus rhythm and trying first dobutamine, and now milrinone. Dependent on bipap to sleep due to PND. Will continue to push diuretics as tolerated 
  
 
Exam: 
Blood pressure 108/65, pulse 89, temperature 99.4 °F (37.4 °C), resp.  rate 18, weight 100.8 kg (222 lb 4.8 oz), SpO2 95 %. Lungs with decreased BP at left base Cor reg with S3 Ext 1-2 + edema Labs pending.   
 
Tom Rowe MD

## 2019-06-12 NOTE — PROGRESS NOTES
Patient: Aydee Diaz MRN: [id number]  SSN: [id number] YOB: 1950  Age: 76 y.o. Sex: male ADMITTED: 2019 to Monet Rivas MD by Vinod Gupta MD for Acute on chronic systolic CH[facility]congest[facility] [I50.23] POD# 2 Days Post-Op Procedure(s): EP CARDIOVERSION Aydee Diaz is running a low grade fever this morning, 99.4. Neal in place, draining clear yellow urine in tube. Good uop overnight, 2425mL. Continues on rocephin, ucx positive for Enterobacter. No new labs this am. He denies pain. He is OOB and sitting in chair. Vitals: Temp (24hrs), Av.7 °F (37.1 °C), Min:98.4 °F (36.9 °C), Max:99.4 °F (37.4 °C) Blood pressure 108/58, pulse 91, temperature 99.4 °F (37.4 °C), resp. rate 18, weight 100.8 kg (222 lb 4.8 oz), SpO2 95 %. Intake and Output: 
06/10 1901 -  0700 In: 3659 [P.O.:880; I.V.:296] Out: [address] [WLARD:] No intake/output data recorded. THERESE Output lats 24 hrs: No data found. THERESE Output last 8 hrs: No data found. BM over last 24 hrs: No data found. Exam:  
Examination Appearance: Well-developed no acute distress Conjunctiva: Conjunctiva and lids normal 
External ears/nose: Normal no lesions or deformities Hearing: Grossly intact Neck: Supple without masses Thyroid: No nodules masses tenderness or enlargement Respiratory effort: SOB, nasal cannula, O2 @ 3L Abdomen/flank: Soft, nontender, without masses, no CVA tenderness : neal in place, urine is clear, yellow in tubing Skin inspection: Warm and dry Sensation: No sensory deficits Judgment/Insight: intact Mood/Affect: normal 
 
Labs: CBC:  
Lab Results Component Value Date/Time WBC 5.4 2019 02:20 AM  
 HCT 32.1 (L) 2019 02:20 AM  
 PLATELET 842  02:20 AM  
 
BMP:  
Lab Results Component Value Date/Time  Glucose 134 (H) 2019 09:32 AM  
 Sodium 133 (L) 2019 09:32 AM  
 Potassium 3.6 2019 09:32 AM  
 Chloride 97 06/12/2019 09:32 AM  
 CO2 29 06/12/2019 09:32 AM  
 BUN 32 (H) 06/12/2019 09:32 AM  
 Creatinine 1.82 (H) 06/12/2019 09:32 AM  
 Calcium 8.9 06/12/2019 09:32 AM  
 
Cultures: reviewed Imaging: reviewed Assessment/Plan: 1. Urinary retention 2. Gross Hematuria 3. UTI Plan: 
-Urine clear, yellow this am. Continue neal. Continue to monitor quality & quantity.  
-Neal education. Okay to DC w/ neal. DC date unknown. He will need to call for f/u appt. OV/VT/prostate assessment. 
-Urology services no longer required while inpatient. Please feel free to re-consult if issues arise. Signed By: Romeo Machuca NP - June 12, 2019 Agree with above. Urine has cleared up

## 2019-06-12 NOTE — PROGRESS NOTES
Problem: Mobility Impaired (Adult and Pediatric) Goal: *Acute Goals and Plan of Care (Insert Text) Description Physical Therapy Goals Initiated 6/7/2019 1. Patient will ambulate with modified independence for 400 feet with the least restrictive device within 7 day(s). Outcome: Progressing Towards Goal 
  
PHYSICAL THERAPY TREATMENT Patient: Jimmy Javier (77 y.o. male) Date: 6/12/2019 Diagnosis: Acute on chronic systolic CHF (congestive heart failure) ([facility]) [I50.23] Acute on chronic systolic CHF (congestive heart failure) ([address] Procedure(s) (LRB): 
EP CARDIOVERSION (N/A) 2 Days Post-Op Precautions:   
Chart, physical therapy assessment, plan of care and goals were reviewed. ASSESSMENT: 
Based on the objective data described below, the patient presents with impaired respiratory status and impaired balance following admission for CHF. He indicates being independent at baseline but has been using a cane with therapy while hospitalized. Noted increased trunk sway and path deviations this date but no rest breaks required. Maintained on 4L O2 via nasal canula with HR 95 BPM and SpO2 94%. Factors impacting this episode of care: increased O2 demand and impaired balance Current Level of Function (mobility/balance): CGA for gait with cane use on 4L Other [facility]: 
Patient continues to benefit from skilled intervention to address the above impairments. Continue treatment per established plan of care. Recommendation for discharge: (in order for the patient to meet his/her long term goals) Therapy at least 2 days/weeks in the home Equipment recommendations for successful discharge (if) home: straight cane use (patient reports owning) SUBJECTIVE:  
Patient stated ? I feel better than yesterday. ? OBJECTIVE DATA SUMMARY:  
Critical Behavior: 
Neurologic State: Alert Orientation Level: Oriented X4 
 Cognition: Appropriate decision making, Appropriate for age attention/concentration, Appropriate safety awareness, Follows commands Functional Mobility Training: 
Bed Mobility: 
  
  
  
  
  
  
Transfers: 
Sit to Stand: Modified independent Stand to Sit: Independent Balance: 
Sitting: Intact Standing: Impaired Standing - Static: Fair Standing - Dynamic : Fair Ambulation/Gait Training: 
Distance (ft): 300 Feet (ft) Assistive Device: Gait belt;Cane, straight Ambulation - Level of Assistance: Contact guard assistance Gait Abnormalities: Decreased step clearance; Path deviations Activity Tolerance:  
Fair Please refer to the flowsheet for vital signs taken during this treatment. After treatment patient left:  
Up in chair Call light within reach COMMUNICATION/COLLABORATION:  
The patient?s plan of care was discussed with: Registered Nurse Jasmeet Mcarthur PT, DPT Time Calculation: 12 mins

## 2019-06-12 NOTE — PROGRESS NOTES
Problem: Heart Failure: Day 5 Goal: Activity/Safety Outcome: Progressing Towards Goal 
Goal: Nutrition/Diet Outcome: Progressing Towards Goal 
Goal: Medications Outcome: Progressing Towards Goal 
Goal: Respiratory Outcome: Progressing Towards Goal 
  
Pt calls for assistance as needed Bedside and Verbal shift change report given to [address] (oncoming nurse) by Melissa Concepcion (offgoing nurse). Report included the following information SBAR, Kardex, Intake/Output, MAR and Recent Results.

## 2019-06-12 NOTE — PROGRESS NOTES
Hospitalist Progress Note Wanda Spears MD 
Please call  and page for questions. Call physician on-call through the  7pm-7am 
 
Daily Progress Note: 6/12/2019 Primary care Mera Dang MD 
 
Date of admission: 5/31/2019  7:03 PM 
 
Admission summery and hospital course: 
59-year-old man with past medical history significant for chronic systolic congestive heart failure, dyslipidemia, hypertension, atrial fibrillation status post cardioversion, was in his usual state of health until the day of presentation at the emergency room when the patient developed worsening of his shortness of breath.  The inpatient underwent cardioversion early this morning by his cardiologist. 
Rosalina King started on 06/05. 
  
Subjective:  
 
6/12/2019 Cont to be hypoxix, doug on walking and cont to have pnd, seems bipap dependent , wore till 0400 am then got up out of bed to chair and finished his sleep effort, that said he had a reasonable night of sleep with bipap/up in chair per his report, seems that he is headed to chronic 02 and nightly BIPAP; expect he will be taken off his pressors soon. Assessment/Plan:  
Acute-on-chronic systolic congestive heart failure NYHA 2-3  
JACQUE on 05/31 with EF of 21%-25%.      
Entresto hold for low BP for increased creatinine. Continue diuresis, amiodarone, Bumex, coreg, IV dobutamin and O2 as needed. CTA of the chest is negative for pulmonary embolism.  Appreciated Dr Nielson/Dr May angela. Aldactone on hold now.   
Patient needs 6 minutes walk prior to discharge.  
Will continue to follow cardiology recommendations. conton milronone qtts 
  
Atrial fibrillation, status post cardioversion.   
Pt still in Afib. Will undergo cardioversion today as per cardiology. Meanwhile continue amiodarone, coreg. On Eliquis for anticoagulation. On pressors  
  
Hypertension. Hold On entresto.  BP is reasonable now.  
 BP Readings from Last 1 Encounters:  
19 108/65  
  
  
Acute kidney injury.   
Creatinine is improving. Most likely as a result of the diuretic therapy and or entresto.  Renal US on  was normal.  
Lab Results Component Value Date/Time Creatinine 1.89 (H) 2019 02:20 AM  
 lab for  planned. UTI: Tmax as 100 on  PM. Afebrile since than. Continue to monitor with Rocephin.  
Ucx c/w Enterobacter Cloacae Insomnia: Will increase the melatonin. Had good night sleep last pm 2019  
  
See orders for other plans. VTE prophylaxis: On Eliquis.  
Code status: Full Discussed plan of care with Patient/Family and Nurse. Patient wife is at the bedside.  
Pre-admission lived at home. Discharge planning: pending Review of Systems:  
 
Review of Systems: 
Symptom  Y/N  Comments   Symptom  Y/N  Comments Fever/Chills   n   Chest Pain  n   
Poor Appetite  n    Edema  n    
Cough  n   Abdominal Pain  n    
Sputum  n   Joint Pain SOB/DOUGLAS  y   Pruritis/Rash Nausea/vomit  n   Tolerating PT/OT Diarrhea     Tolerating Diet Constipation     Other Could not obtain due to:    
 
 
Objective:  
Physical Exam:  
 
Visit Vitals /65 (BP 1 Location: Left arm, BP Patient Position: Sitting) Pulse 89 Temp 99.4 °F (37.4 °C) Resp 18 Wt 100.8 kg (222 lb 4.8 oz) SpO2 95% BMI 28.54 kg/m² O2 Flow Rate (L/min): 4 l/min O2 Device: BIPAP Temp (24hrs), Av.7 °F (37.1 °C), Min:98.4 °F (36.9 °C), Max:99.4 °F (37.4 °C) No intake/output data recorded. 06/10 1901 -  0700 In: 9905 [P.O.:880; I.V.:296] Out: [address] [[id number]] Stable exam.2019 General:  Alert, cooperative, no distress, appears stated age. Lungs:   Nearly clear eldon bases Heart:  Regular rate and rhythm, S1, S2 normal, no murmur.   
Abdomen:   Soft, non-tender. Bowel sounds normal.   
Extremities: Extremities normal, atraumatic, no cyanosis. Edema at LEs. Skin: Skin color, texture, turgor normal. No rashes or lesions Neurologic: Patient was SOB while he walked slowly at the hallway. Patient interacts well. Patient voice is clear.   
  
Data Review:  
   
Recent Days: 
Recent Labs  
  06/11/19 0220 WBC 5.4 HGB 10.2* HCT 32.1*  
 Recent Labs  
  06/11/19 
0220 06/10/19 
0541  135* K 4.5 4.4  
 101 CO2 29 27 * 100 BUN 39* 37* CREA 1.89* 1.91* CA 8.7 8.7 ALB 2.6*  --   
SGOT 15  --   
ALT 24  -- No results for input(s): PH, PCO2, PO2, HCO3, FIO2 in the last 72 hours. 24 Hour Results: No results found for this or any previous visit (from the past 24 hour(s)). Problem List: 
Problem List as of 6/12/2019 Date Reviewed: 6/10/2019 Codes Class Noted - Resolved * (Principal) Acute on chronic systolic CHF (congestive heart failure) (HCC) ICD-10-CM: Q09.66 ICD-9-CM: 428.23, 428.0  5/31/2019 - Present Paroxysmal atrial fibrillation (HCC) ICD-10-CM: I48.0 ICD-9-CM: 427.31  4/2/2019 - Present Medications reviewed Current Facility-Administered Medications Medication Dose Route Frequency  lactulose (CHRONULAC) 10 gram/15 mL solution 45 mL  45 mL Oral DAILY  bumetanide (BUMEX) injection 1 mg  1 mg IntraVENous BID  milrinone (PRIMACOR) 20 MG/100 ML D5W infusion  0.43 mcg/kg/min IntraVENous CONTINUOUS  
 melatonin tablet 3 mg  3 mg Oral QHS PRN  
 amiodarone (CORDARONE) tablet 200 mg  200 mg Oral BID  
 apixaban (ELIQUIS) tablet 5 mg  5 mg Oral Q12H  aspirin delayed-release tablet 81 mg  81 mg Oral DAILY  carvedilol (COREG) tablet 12.5 mg  12.5 mg Oral BID WITH MEALS  sodium chloride (NS) flush 5-40 mL  5-40 mL IntraVENous Q8H  
 sodium chloride (NS) flush 5-40 mL  5-40 mL IntraVENous PRN  
 ondansetron (ZOFRAN) injection 4 mg  4 mg IntraVENous Q4H PRN  
 bisacodyl (DULCOLAX) tablet 5 mg  5 mg Oral DAILY PRN  
  acetaminophen (TYLENOL) tablet 650 mg  650 mg Oral Q4H PRN  
 morphine injection 2 mg  2 mg IntraVENous Q4H PRN Care Plan discussed with: Patient/Family and Nurse Total time spent with patient: 30 minutes.  
 
Odessa James MD

## 2019-06-12 NOTE — PROGRESS NOTES
Bedside shift change report given to Jaswinder Us RN (oncoming nurse) by Farzana Kelly RN (offgoing nurse). Report included the following information SBAR, Kardex, Intake/Output, MAR, Recent Results and Cardiac Rhythm NSR w 1 degree avb, BBB. Problem: Heart Failure: Day 5 Goal: Activity/Safety Outcome: Progressing Towards Goal 
Note: Up with one assist, dyspneic on exertion Goal: Medications Outcome: Progressing Towards Goal 
Note: On milrinone drip. Receiving amiodarone, coreg, bumex. Spironolactone started today. Goal: Respiratory Outcome: Progressing Towards Goal 
Note:  
Pt on 3L NC. Maintaining SpO2 >90%. Occasionally dyspneic on exertion. Wearing BIPAP at night. Will continue to monitor. Goal: Treatments/Interventions/Procedures Outcome: Progressing Towards Goal 
Note:  
Advanced heart failure team consulted today Problem: Urinary Tract Infection - Adult Goal: *Absence of infection signs and symptoms Outcome: Progressing Towards Goal 
Note:  
Finished course of antibiotics. Neal placed for retention two days ago. Keeping neal per urology. Output still pinkish/yellow

## 2019-06-13 LAB
ANION GAP SERPL CALC-SCNC: 7 MMOL/L (ref 5–15)
ATRIAL RATE: 84 BPM
BUN SERPL-MCNC: 30 MG/DL (ref 6–20)
BUN/CREAT SERPL: 17 (ref 12–20)
CALCIUM SERPL-MCNC: 8.1 MG/DL (ref 8.5–10.1)
CALCULATED R AXIS, ECG10: 0 DEGREES
CALCULATED T AXIS, ECG11: 143 DEGREES
CHLORIDE SERPL-SCNC: 100 MMOL/L (ref 97–108)
CO2 SERPL-SCNC: 29 MMOL/L (ref 21–32)
CREAT SERPL-MCNC: 1.74 MG/DL (ref 0.7–1.3)
DIAGNOSIS, 93000: NORMAL
FERRITIN SERPL-MCNC: 244 NG/ML (ref 26–388)
GLUCOSE SERPL-MCNC: 102 MG/DL (ref 65–100)
HAV IGM SER QL: NONREACTIVE
HBV CORE IGM SER QL: NONREACTIVE
HBV SURFACE AB SER QL: NONREACTIVE
HBV SURFACE AB SER-ACNC: 3.25 MIU/ML
HBV SURFACE AG SER QL: <0.1 INDEX
HBV SURFACE AG SER QL: <0.1 INDEX
HBV SURFACE AG SER QL: NEGATIVE
HBV SURFACE AG SER QL: NEGATIVE
HCV AB SERPL QL IA: NONREACTIVE
HCV AB SERPL QL IA: NONREACTIVE
HCV COMMENT,HCGAC: NORMAL
HCV COMMENT,HCGAC: NORMAL
IRON SATN MFR SERPL: 11 % (ref 20–50)
IRON SERPL-MCNC: 27 UG/DL (ref 35–150)
POTASSIUM SERPL-SCNC: 3.8 MMOL/L (ref 3.5–5.1)
Q-T INTERVAL, ECG07: 446 MS
QRS DURATION, ECG06: 158 MS
QTC CALCULATION (BEZET), ECG08: 511 MS
SODIUM SERPL-SCNC: 136 MMOL/L (ref 136–145)
SP1: NORMAL
SP2: NORMAL
SP3: NORMAL
TIBC SERPL-MCNC: 236 UG/DL (ref 250–450)
VENTRICULAR RATE, ECG03: 79 BPM

## 2019-06-13 PROCEDURE — 97116 GAIT TRAINING THERAPY: CPT

## 2019-06-13 PROCEDURE — 74011250637 HC RX REV CODE- 250/637: Performed by: INTERNAL MEDICINE

## 2019-06-13 PROCEDURE — 86704 HEP B CORE ANTIBODY TOTAL: CPT

## 2019-06-13 PROCEDURE — 83540 ASSAY OF IRON: CPT

## 2019-06-13 PROCEDURE — 87340 HEPATITIS B SURFACE AG IA: CPT

## 2019-06-13 PROCEDURE — 86709 HEPATITIS A IGM ANTIBODY: CPT

## 2019-06-13 PROCEDURE — 86803 HEPATITIS C AB TEST: CPT

## 2019-06-13 PROCEDURE — 86618 LYME DISEASE ANTIBODY: CPT

## 2019-06-13 PROCEDURE — 74011000250 HC RX REV CODE- 250: Performed by: INTERNAL MEDICINE

## 2019-06-13 PROCEDURE — 93005 ELECTROCARDIOGRAM TRACING: CPT

## 2019-06-13 PROCEDURE — 80074 ACUTE HEPATITIS PANEL: CPT

## 2019-06-13 PROCEDURE — 77010033678 HC OXYGEN DAILY

## 2019-06-13 PROCEDURE — 80048 BASIC METABOLIC PNL TOTAL CA: CPT

## 2019-06-13 PROCEDURE — 74011250636 HC RX REV CODE- 250/636: Performed by: INTERNAL MEDICINE

## 2019-06-13 PROCEDURE — 74011250636 HC RX REV CODE- 250/636: Performed by: NURSE PRACTITIONER

## 2019-06-13 PROCEDURE — 82728 ASSAY OF FERRITIN: CPT

## 2019-06-13 PROCEDURE — 87086 URINE CULTURE/COLONY COUNT: CPT

## 2019-06-13 PROCEDURE — 86706 HEP B SURFACE ANTIBODY: CPT

## 2019-06-13 PROCEDURE — 99222 1ST HOSP IP/OBS MODERATE 55: CPT | Performed by: INTERNAL MEDICINE

## 2019-06-13 PROCEDURE — 36415 COLL VENOUS BLD VENIPUNCTURE: CPT

## 2019-06-13 PROCEDURE — 82784 ASSAY IGA/IGD/IGG/IGM EACH: CPT

## 2019-06-13 PROCEDURE — 94760 N-INVAS EAR/PLS OXIMETRY 1: CPT

## 2019-06-13 PROCEDURE — 65660000001 HC RM ICU INTERMED STEPDOWN

## 2019-06-13 RX ORDER — BUMETANIDE 0.25 MG/ML
2 INJECTION INTRAMUSCULAR; INTRAVENOUS 2 TIMES DAILY
Status: DISCONTINUED | OUTPATIENT
Start: 2019-06-13 | End: 2019-06-14

## 2019-06-13 RX ADMIN — AMIODARONE HYDROCHLORIDE 200 MG: 200 TABLET ORAL at 08:12

## 2019-06-13 RX ADMIN — CARVEDILOL 12.5 MG: 12.5 TABLET, FILM COATED ORAL at 17:10

## 2019-06-13 RX ADMIN — MILRINONE LACTATE IN DEXTROSE 0.43 MCG/KG/MIN: 200 INJECTION, SOLUTION INTRAVENOUS at 07:40

## 2019-06-13 RX ADMIN — Medication 10 ML: at 21:08

## 2019-06-13 RX ADMIN — CARVEDILOL 12.5 MG: 12.5 TABLET, FILM COATED ORAL at 08:12

## 2019-06-13 RX ADMIN — SPIRONOLACTONE 25 MG: 25 TABLET ORAL at 10:37

## 2019-06-13 RX ADMIN — MILRINONE LACTATE IN DEXTROSE 0.25 MCG/KG/MIN: 200 INJECTION, SOLUTION INTRAVENOUS at 17:10

## 2019-06-13 RX ADMIN — AMIODARONE HYDROCHLORIDE 200 MG: 200 TABLET ORAL at 18:24

## 2019-06-13 RX ADMIN — ASPIRIN 81 MG: 81 TABLET ORAL at 08:12

## 2019-06-13 RX ADMIN — Medication 10 ML: at 06:00

## 2019-06-13 RX ADMIN — Medication 10 ML: at 14:54

## 2019-06-13 RX ADMIN — APIXABAN 5 MG: 5 TABLET, FILM COATED ORAL at 08:12

## 2019-06-13 RX ADMIN — BUMETANIDE 1 MG: 0.25 INJECTION INTRAMUSCULAR; INTRAVENOUS at 10:37

## 2019-06-13 RX ADMIN — APIXABAN 5 MG: 5 TABLET, FILM COATED ORAL at 21:08

## 2019-06-13 NOTE — PROGRESS NOTES
S Cardiology Progress Note                                        [facility] Date: 5/31/2019 
  
  
Pt reports that he did a little better walking yesterday but was on O2 at the time; still needs bipap at night to sleep; He reverted to atrial flutter overnight with slightly increased rate Urinary retention with indwelling neal expected to continue through hospitalization. Assessment/Plan: # Acute on chronic systolic HF -EF 10% 
- improved initially  with diuretics, but creat increased and BP dropped; changed dobutamine to milrinone monday as BP had increased; increased bumex; readded spironolactone; he did have improved diuresis yesterday 
-Advanced heart failure to see for further suggestions -Bipap for home? 
  
# AF -reverted to afib, cont eliquis and amiodarone; in aflutter, CV monday and now back in flutter; have asked EP for any other suggestions  
- Consider ablation in future.  
  
# LBBB - he has progressed recently with LBBB. Will plan upgrade to BiV ICD/pacer in future-will need to hold off in light of UTI/neal 
  
# Acute on chronic kidney injury-renal US normal; stabilized, though higher than his baseline; also has some retention and flomax caused hypotension; UO good and creat is improving 
  
# fever- urine culture positive with gram-rods; on rocephin-last day yesterday; low gr temp this am; will send repeat culture 
  #urinary retention-urology suggests home with neal; has some hematuria which appears mild Exam: 
Blood pressure 99/66, pulse 98, temperature 98.3 °F (36.8 °C), resp. rate 20, weight 98.9 kg (218 lb), SpO2 93 %. Lungs with decreased BS at left base Cor irreg with S3 Ext 2+ edema Labs: 
Cr 1.74 
K 3.8 Real Zamora MD

## 2019-06-13 NOTE — PROGRESS NOTES
Problem: Heart Failure: Day 5 Goal: Activity/Safety Outcome: Progressing Towards Goal 
Goal: Nutrition/Diet Outcome: Progressing Towards Goal 
Goal: Medications Outcome: Progressing Towards Goal 
Goal: Respiratory Outcome: Progressing Towards Goal 
  
Bedside and Verbal shift change report given to [address] (oncoming nurse) by Wayne Arreola RN (offgoing nurse). Report included the following information SBAR, Kardex, Intake/Output, MAR and Recent Results.

## 2019-06-13 NOTE — PROGRESS NOTES
1930: Bedside shift change report given to [facility] (oncoming nurse) by Thais Mckeon RN (offgoing nurse). Report included the following information SBAR, Kardex, Intake/Output, MAR, Recent Results and Cardiac Rhythm NSR. Problem: Heart Failure: Day 5 Goal: Activity/Safety Outcome: Progressing Towards Goal 
Note:  
Walking in hallway with PT today. Up with standby assist to chair Goal: Medications Outcome: Progressing Towards Goal 
Note:  
Milrinone drip adjusted per Advanced Heart Failure Team. Receiving bumex, spironolactone, coreg, and amiodarone. Goal: Respiratory Outcome: Progressing Towards Goal 
Note:  
Pt on 2 L NC and maintaining SpO2 >90%. Dyspneic on exertion. Wearing BIPAP at night. Will need new 6 minute walk test prior to discharge. Goal: Treatments/Interventions/Procedures Outcome: Progressing Towards Goal 
Note:  
Met with advanced heart failure team today. Problem: Falls - Risk of 
Goal: *Absence of Falls Description Document Donato Disla Fall Risk and appropriate interventions in the flowsheet. Outcome: Progressing Towards Goal 
Note:  
Fall Risk Interventions: 
Mobility Interventions: Communicate number of staff needed for ambulation/transfer, OT consult for ADLs, Patient to call before getting OOB, PT Consult for mobility concerns, PT Consult for assist device competence, Strengthening exercises (ROM-active/passive), Utilize walker, cane, or other assistive device Medication Interventions: Assess postural VS orthostatic hypotension, Evaluate medications/consider consulting pharmacy, Patient to call before getting OOB, Teach patient to arise slowly Elimination Interventions: Call light in reach, Patient to call for help with toileting needs, Stay With Me (per policy), Toilet paper/wipes in reach, Toileting schedule/hourly rounds History of Falls Interventions: Room close to nurse's station, Door open when patient unattended Problem: Urinary Tract Infection - Adult Goal: *Absence of infection signs and symptoms Outcome: Progressing Towards Goal 
Note: Lizama discontinued this morning. Repeat urine culture sent. Urine is yellow/tea colored, clear. No complaints of pain. Monitoring output.

## 2019-06-13 NOTE — PROGRESS NOTES
[address] [facility] [address] in [place], [place] Inpatient Progress Note Patient name: Anya Bingham Patient : 1950 Patient MRN: [id number] Attending MD: Sasha Rogers MD 
Date of service: 19 CHIEF COMPLAINT: 
Acute on chronic systolic heart failure PLAN: 
Continue inotrope assisted diuresis until euvolemic; probably 10-15 lbs more Once euvolemic (and UTI confirmed treated) would wean milrinone to off for baseline RHC Consider either reattempting DCCV or proceed with AVN ablation with CRT upgrade with/without inotropic bridge depending on baseline RHC numbers; TBD by EP cardiology For now decrease milrinone to 0.25 mcg/kg/min due to hypotension Increase bumex to 2mg IV twice daily; goal net negative 2-3 liters Continue spironolactone 25mg daily Continue amiodarone for rate control; may need to reduce coreg dose as we diurese Intolerant to ACE/ARB/ARNi due to renal dysfunction and while aggressively diuresed Will retrieve most recent [facility] results Venofer infusion and check hemoccult Complete HF workup: hepatitis panel, AAKASH, lyme panels, gammapathy profile Advanced care plan, will provide patient with forms Orthostatics daily Ambulate daily PT/OT IMPRESSION: 
Fatigue Shortness of breath Volume overload Pulmonary edema Acute on chronic systolic heart failure Stage C, NYHA class IIIA symptoms Coronary artery disease S/p arrest VFib and subsequent CABG  Sternal wound infection requiring sternectomy Ischemic cardiomyopathy, LVEF 20% 10/18 Atrial fibrillation s/p failed DCCV 6/10/19 Chronic anticoagulation with eliquis S/p ICD 3/21/11 LBBB,  ms Acute on CKD, stage 3 
UTI Urinary retension Anemia, iron deficiency H/o DVT Migraines JACQUE on 19 showed thrombus H/o tobacco abuse Nighttime desaturations, likely JOSE Body mass index is 27.99 kg/m². INTERVAL HISTORY: 
Shortness of breath at 20 yards Abdominal fullness Leg edema Afebrile -110s/60-90s HR 80s Atrial flutter Amiodarone 0.25 I/O net negative 1.1 liters, urine 2.4 liters WBC 5.4 HGB 10.2  Cr 1.74 from 1.82 Potassium 3.8 Iron sat 11% TSH wnl 
UA WBC >100K CARDIAC IMAGING: 
Echo (5/31/19) LVEF 21-25%, severely dilated LA, moderately dilated RA 
EKG (6/12/19) atrial flutter with occasional PVCs, LBBB QRS 158ms [facility] not in epic HEMODYNAMICS: 
RHC not done CPEST not done 6MW not done OTHER IMAGING: 
CT chest (5/31/19) 1. No evidence of pulmonary embolus. 2. Interstitial edema and small bilateral pleural effusions. 3. Mild mediastinal adenopathy. INTERVAL HISTORY: 
Briefly, this is a 77 y/o pleasant white male with h/o HTN, HL, likey JOSE, CAD s/p cardiac arrest VFib s/p CABG (2011) c/b sternal would infection and sternotomy, ischemic cardiomyopathy LVEF 15-20%, s/p ICD and with LBBB. Patient admitted with acute on chronic systolic heart failure with massive volume overload > 20 lbs, in the setting of atrial fibrillation s/p failed DCCV and new UTI now treated with IV antibiotics. PHYSICAL EXAM: 
Visit Vitals BP (!) 101/94 (BP 1 Location: Left arm, BP Patient Position: At rest) Pulse 81 Temp 98.8 °F (37.1 °C) Resp 18 Wt 218 lb (98.9 kg) SpO2 98% BMI 27.99 kg/m² General: Patient is well developed, well-nourished in no acute distress HEENT: Normocephalic and atraumatic. No scleral icterus. Pupils are equal, round and reactive to light and accomodation. No conjunctival injection. Oropharynx is clear. Neck: Supple. No evidence of thyroid enlargements or lymphadenopathy. JVD: Cannot be appreciated Lungs: Breath sounds are equal and clear bilaterally. No wheezes, rhonchi, or rales. Heart: Regular rate and rhythm with normal S1 and S2. No murmurs, gallops or rubs. Abdomen: Soft, no mass or tenderness. No organomegaly or hernia. Bowel sounds present. Genitourinary and rectal: deferred Extremities: No cyanosis, clubbing, 3+ BLE edema. Neurologic: No focal sensory or motor deficits are noted. Grossly intact. Psychiatric: Awake, alert an doriented x 3. Appropriate mood and affect. Skin: Warm, dry and well perfused. No lesions, nodules or rashes are noted. REVIEW OF SYSTEMS: 
General: Denies fever, night sweats. Ear, nose and throat: Denies difficulty hearing, sinus problems, runny nose, post-nasal drip, ringing in ears, mouth sores, loose teeth, ear pain, nosebleeds, sore throate, facial pain or numbess Cardiovascular: see above in the interval history Respiratory: Denies cough, wheezing, sputum production, hemoptysis. Gastrointestinal: Denies heartburn, constipation, intolerance to certain foods, diarrhea, abdominal pain, nausea, vomiting, difficulty swallowing, blood in stool Kidney and bladder: Denies painful urination, frequent urination, urgency, prostate problems and impotence Musculoskeletal: Denies joint pain, muscle weakness Skin and hair: Denies change in existing skin lesions, hair loss or increase, breast changes PAST MEDICAL HISTORY: 
Past Medical History:  
Diagnosis Date  Degenerative disc disease, lumbar  Heart failure ([address]  High cholesterol  Hypertension  Paroxysmal atrial fibrillation ([address] 4/2/2019  Spinal stenosis PAST SURGICAL HISTORY: 
Past Surgical History:  
Procedure Laterality Date  HX APPENDECTOMY  HX CORONARY ARTERY BYPASS GRAFT    
 triple  HX HERNIA REPAIR    
 HX IMPLANTABLE CARDIOVERTER DEFIBRILLATOR  NE CARDIOVERSION ELECTIVE ARRHYTHMIA EXTERNAL N/A 6/10/2019 EP CARDIOVERSION performed by Patrice Hodge MD at [facility]  CATH LAB FAMILY HISTORY: 
History reviewed. No pertinent family history. SOCIAL HISTORY: 
Social History Socioeconomic History  Marital status:  Spouse name: Not on file  Number of children: Not on file  Years of education: Not on file  Highest education level: Not on file Tobacco Use  Smoking status: Former Smoker Last attempt to quit: 2010 Years since quittin.5  Smokeless tobacco: Never Used Substance and Sexual Activity  Alcohol use: Yes Comment: rarely  Drug use: Never LABORATORY RESULTS: 
  
Labs Latest Ref Rng & Units 2019 2019 2019 6/10/2019 2019 2019 2019 WBC 4.1 - 11.1 K/uL - - 5.4 - - 5.5 6.4  
RBC 4.10 - 5.70 M/uL - - 3.39(L) - - 3.70(L) 3.62(L) Hemoglobin 12.1 - 17.0 g/dL - - 10. 2(L) - - 11. 2(L) 11. 1(L) Hematocrit 36.6 - 50.3 % - - 32. 1(L) - - 35. 2(L) 34. 3(L) MCV 80.0 - 99.0 FL - - 94.7 - - 95.1 94.8 Platelets 214 - 786 K/uL - - 207 - - 190 162 Lymphocytes 12 - 49 % - - 14 - - 14 7(L) Monocytes 5 - 13 % - - 10 - - 12 6 Eosinophils 0 - 7 % - - 2 - - 3 0 Basophils 0 - 1 % - - 1 - - 1 0 Bands 0 - 6 % - - - - - - 1 Albumin 3.5 - 5.0 g/dL - - 2. 6(L) - - - -  
Calcium 8.5 - 10.1 MG/DL 8. 1(L) 8.9 8.7 8.7 8.8 8.9 8.7 SGOT 15 - 37 U/L - - 15 - - - -  
Glucose 65 - 100 mg/dL 102(H) 134(H) 102(H) 100 99 95 101(H) BUN 6 - 20 MG/DL 30(H) 32(H) 39(H) 37(H) 37(H) 30(H) 35(H) Creatinine 0.70 - 1.30 MG/DL 1.74(H) 1.82(H) 1.89(H) 1.91(H) 1.88(H) 1.82(H) 2.10(H) Sodium 136 - 145 mmol/L 136 133(L) 136 135(L) 135(L) 134(L) 134(L) Potassium 3.5 - 5.1 mmol/L 3.8 3.6 4.5 4.4 4.4 4.4 4.0 TSH 0.36 - 3.74 uIU/mL - - - - - - - Some recent data might be hidden Lab Results Component Value Date/Time TSH 2.45 2019 04:16 AM  
 
 
CURRENT MEDICATIONS: 
 
Current Facility-Administered Medications:  
  spironolactone (ALDACTONE) tablet 25 mg, 25 mg, Oral, DAILY, Dottie Stoll MD, 25 mg at 19 1037 
  lactulose (CHRONULAC) 10 gram/15 mL solution 45 mL, 45 mL, Oral, DAILY, Doroteo Craft MD, 45 mL at 19 8393   bumetanide (BUMEX) injection 1 mg, 1 mg, IntraVENous, BID, Dottie Stoll MD, 1 mg at 19 1037   milrinone (PRIMACOR) 20 MG/100 ML D5W infusion, 0.25 mcg/kg/min, IntraVENous, CONTINUOUS, Joseph ACUNA NP, Last Rate: 7.5 mL/hr at 06/13/19 1038, 0.25 mcg/kg/min at 06/13/19 1038   melatonin tablet 3 mg, 3 mg, Oral, QHS PRN, navjot, Ventura Alcantara MD, 3 mg at 06/09/19 2125 
  amiodarone (CORDARONE) tablet 200 mg, 200 mg, Oral, BID, Georgia Thompson MD, 200 mg at 06/13/19 1465   apixaban (ELIQUIS) tablet 5 mg, 5 mg, Oral, Q12H, Lizet Beal MD, 5 mg at 06/13/19 7762   aspirin delayed-release tablet 81 mg, 81 mg, Oral, DAILY, Lizet Beal MD, 81 mg at 06/13/19 7627   carvedilol (COREG) tablet 12.5 mg, 12.5 mg, Oral, BID WITH MEALS, Georgia Thompson MD, 12.5 mg at 06/13/19 9454   sodium chloride (NS) flush 5-40 mL, 5-40 mL, IntraVENous, Q8H, Lizet Beal MD, 10 mL at 06/13/19 1454   sodium chloride (NS) flush 5-40 mL, 5-40 mL, IntraVENous, PRN, Lizet Beal MD, 10 mL at 06/03/19 7942   ondansetron (ZOFRAN) injection 4 mg, 4 mg, IntraVENous, Q4H PRN, Lizet Beal MD 
  bisacodyl (DULCOLAX) tablet 5 mg, 5 mg, Oral, DAILY PRN, Lizet Beal MD 
  acetaminophen (TYLENOL) tablet 650 mg, 650 mg, Oral, Q4H PRN, Lizet Beal MD, 650 mg at 06/05/19 1642   morphine injection 2 mg, 2 mg, IntraVENous, Q4H PRN, Lizet Beal MD 
 
 
Thank you for allowing me to participate in this patient's care. Gene Glynn MD PhD 
[address], [address] Phone: [phone] Fax: [phone]

## 2019-06-13 NOTE — PROGRESS NOTES
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 JESÚS Plan: 1. Home oxygen set up if needed 2. Home Health PT 
3. Follow up with Cardiology 4. Follow up with Urology CM noted consult for bipap. Patient will need outpatient sleep study. CM available to facilitate set-up of home oxygen if this is needed; will need orders and qualifying stats.  
 
Rubi Connelly, MS

## 2019-06-13 NOTE — PROGRESS NOTES
Problem: Mobility Impaired (Adult and Pediatric) Goal: *Acute Goals and Plan of Care (Insert Text) Description Physical Therapy Goals Initiated 6/7/2019 1. Patient will ambulate with modified independence for 400 feet with the least restrictive device within 7 day(s). Outcome: Progressing Towards Goal 
 PHYSICAL THERAPY TREATMENT Patient: Montserrat Bryant (77 y.o. male) Date: 6/13/2019 Diagnosis: Acute on chronic systolic CHF (congestive heart failure) ([facility]) [I50.23] Acute on chronic systolic CHF (congestive heart failure) ([facility] [address] Procedure(s) (LRB): 
EP CARDIOVERSION (N/A) 3 Days Post-Op Precautions:   
Chart, physical therapy assessment, plan of care and goals were reviewed. ASSESSMENT Based on the objective data described below, the patient presents with impaired standing dynamic balance and with increased O2 demand to maintain SpO2 with exertion. 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 
Patient continues to benefit from skilled intervention to address the above impairments. Continue treatment per established plan of care. Recommendation for discharge: (in order for the patient to meet his/her long term goals) Therapy at least 2 days/weeks in the home This discharge recommendation: 
Has not yet been discussed the attending provider and/or case management SUBJECTIVE:  
Patient stated ? I have always been clumsy. ? OBJECTIVE DATA SUMMARY:  
Critical Behavior: 
Neurologic State: Alert Orientation Level: Oriented X4 Cognition: Appropriate decision making, Appropriate for age attention/concentration, Appropriate safety awareness, Follows commands Functional Mobility Training: 
Bed Mobility: 
  
  
  
  
  
  
Transfers: 
Sit to Stand: Modified independent Stand to Sit: Independent Balance: 
Sitting: Intact Standing: Impaired Standing - Static: Fair Standing - Dynamic : Fair Ambulation/Gait Training: 
Distance (ft): 500 Feet (ft)(with seated rest break) Assistive Device: Gait belt;Cane, straight;Walker, rolling Ambulation - Level of Assistance: Contact guard assistance Gait Abnormalities: Decreased step clearance 300' with cane with increased trunk sway and anterior/posterior sway with static stance. Noted COG shifted posteriorly with gait and anteriorly standing. Seated rest and 2nd trial x200' with RW with improved posture, diminished sway, and improved genna Final 48' without DME with greatly increased sway and path deviations Tinetti test: 
 
Sitting Balance: 1 Arises: 1 Attempts to Rise: 2 Immediate Standing Balance: 2 Standing Balance: 2 Nudged: 1 Eyes Closed: 0 Turn 360 Degrees - Continuous/Discontinuous: 0 Turn 360 Degrees - Steady/Unsteady: 0 Sitting Down: 1 Balance Score: 10 Indication of Gait: 1 
R Step Length/Height: 1 L Step Length/Height: 1 
R Foot Clearance: 1 L Foot Clearance: 1 Step Symmetry: 0 Step Continuity: 1 Path: 1 Trunk: 1 Walking Time: 1 Gait Score: 9 Total Score: 19 Tinetti Tool Score Risk of Falls 
<19 = High Fall Risk 19-24 = Moderate Fall Risk 25-28 = Low Fall Risk Tinetti ME. Performance-Oriented Assessment of Mobility Problems in Elderly Patients. [address]; [id number]. (Scoring Description: PT Bulletin Feb. 10, 1993) Older adults: Narciso Farnsworth et al, 2009; n = [address] [facility] elderly evaluated with ABC, XAVIER, ADL, and IADL) · Mean XAVIER score for males aged 69-68 years = 26.21(3.40) · Mean XAVIER score for females age 69-68 years = 25.16(4.30) · Mean XAVIER score for males over 80 years = 23.29(6.02) · Mean XAVIER score for females over 80 years = 17.20(8.32) Activity Tolerance:  
Fair HR up to 122 BPM during gait, SpO2 93% on 2L O2 Please refer to the flowsheet for vital signs taken during this treatment. After treatment patient left:  
Up in chair Call light within reach COMMUNICATION/COLLABORATION:  
The patient?s plan of care was discussed with: Registered Nurse Reggie Savage, PT, DPT Time Calculation: 26 mins

## 2019-06-13 NOTE — PROGRESS NOTES
A Spiritual Care Partner Volunteer visited patient in [address] on 6/13/2019. Documented by: 
Chaplain Rivera MDiv, MS, [address] 
[address] [place] [address] [phone]

## 2019-06-14 LAB
ALBUMIN SERPL ELPH-MCNC: 2.7 G/DL (ref 2.9–4.4)
ALBUMIN SERPL-MCNC: 2.5 G/DL (ref 3.5–5)
ALBUMIN/GLOB SERPL: 0.6 {RATIO} (ref 1.1–2.2)
ALBUMIN/GLOB SERPL: 1 {RATIO} (ref 0.7–1.7)
ALP SERPL-CCNC: 100 U/L (ref 45–117)
ALPHA1 GLOB SERPL ELPH-MCNC: 0.4 G/DL (ref 0–0.4)
ALPHA2 GLOB SERPL ELPH-MCNC: 1 G/DL (ref 0.4–1)
ALT SERPL-CCNC: 20 U/L (ref 12–78)
ANION GAP SERPL CALC-SCNC: 5 MMOL/L (ref 5–15)
ANION GAP SERPL CALC-SCNC: 8 MMOL/L (ref 5–15)
AST SERPL-CCNC: 13 U/L (ref 15–37)
B BURGDOR IGM SER IA-ACNC: <0.8 INDEX (ref 0–0.79)
B-GLOBULIN SERPL ELPH-MCNC: 0.9 G/DL (ref 0.7–1.3)
BACTERIA SPEC CULT: NORMAL
BILIRUB SERPL-MCNC: 0.4 MG/DL (ref 0.2–1)
BNP SERPL-MCNC: 6493 PG/ML
BUN SERPL-MCNC: 30 MG/DL (ref 6–20)
BUN SERPL-MCNC: 31 MG/DL (ref 6–20)
BUN/CREAT SERPL: 17 (ref 12–20)
BUN/CREAT SERPL: 18 (ref 12–20)
CALCIUM SERPL-MCNC: 8.6 MG/DL (ref 8.5–10.1)
CALCIUM SERPL-MCNC: 9 MG/DL (ref 8.5–10.1)
CC UR VC: NORMAL
CHLORIDE SERPL-SCNC: 100 MMOL/L (ref 97–108)
CHLORIDE SERPL-SCNC: 100 MMOL/L (ref 97–108)
CO2 SERPL-SCNC: 29 MMOL/L (ref 21–32)
CO2 SERPL-SCNC: 32 MMOL/L (ref 21–32)
COMMENT, HOLDF: NORMAL
CREAT SERPL-MCNC: 1.74 MG/DL (ref 0.7–1.3)
CREAT SERPL-MCNC: 1.76 MG/DL (ref 0.7–1.3)
ERYTHROCYTE [DISTWIDTH] IN BLOOD BY AUTOMATED COUNT: 13.5 % (ref 11.5–14.5)
GAMMA GLOB SERPL ELPH-MCNC: 0.7 G/DL (ref 0.4–1.8)
GLOBULIN SER CALC-MCNC: 4 G/DL (ref 2–4)
GLOBULIN SER-MCNC: 3 G/DL (ref 2.2–3.9)
GLUCOSE SERPL-MCNC: 106 MG/DL (ref 65–100)
GLUCOSE SERPL-MCNC: 94 MG/DL (ref 65–100)
HAV AB SER QL IA: NEGATIVE
HAV IGM SERPL QL IA: NEGATIVE
HBV CORE AB SERPL QL IA: NEGATIVE
HCT VFR BLD AUTO: 34.3 % (ref 36.6–50.3)
HGB BLD-MCNC: 11 G/DL (ref 12.1–17)
IGA SERPL-MCNC: 348 MG/DL (ref 61–437)
IGG SERPL-MCNC: 729 MG/DL (ref 700–1600)
IGM SERPL-MCNC: 137 MG/DL (ref 20–172)
INTERPRETATION SERPL IEP-IMP: ABNORMAL
KAPPA LC FREE SER-MCNC: 75.5 MG/L (ref 3.3–19.4)
KAPPA LC FREE/LAMBDA FREE SER: 2.26 {RATIO} (ref 0.26–1.65)
LAMBDA LC FREE SERPL-MCNC: 33.4 MG/L (ref 5.7–26.3)
M PROTEIN SERPL ELPH-MCNC: ABNORMAL G/DL
MAGNESIUM SERPL-MCNC: 2.3 MG/DL (ref 1.6–2.4)
MCH RBC QN AUTO: 30.5 PG (ref 26–34)
MCHC RBC AUTO-ENTMCNC: 32.1 G/DL (ref 30–36.5)
MCV RBC AUTO: 95 FL (ref 80–99)
NRBC # BLD: 0 K/UL (ref 0–0.01)
NRBC BLD-RTO: 0 PER 100 WBC
PLATELET # BLD AUTO: 293 K/UL (ref 150–400)
PMV BLD AUTO: 9 FL (ref 8.9–12.9)
POTASSIUM SERPL-SCNC: 4.1 MMOL/L (ref 3.5–5.1)
POTASSIUM SERPL-SCNC: 4.4 MMOL/L (ref 3.5–5.1)
PROT SERPL-MCNC: 5.7 G/DL (ref 6–8.5)
PROT SERPL-MCNC: 6.5 G/DL (ref 6.4–8.2)
RBC # BLD AUTO: 3.61 M/UL (ref 4.1–5.7)
SAMPLES BEING HELD,HOLD: NORMAL
SERVICE CMNT-IMP: NORMAL
SODIUM SERPL-SCNC: 137 MMOL/L (ref 136–145)
SODIUM SERPL-SCNC: 137 MMOL/L (ref 136–145)
WBC # BLD AUTO: 6.2 K/UL (ref 4.1–11.1)

## 2019-06-14 PROCEDURE — 74011250637 HC RX REV CODE- 250/637: Performed by: INTERNAL MEDICINE

## 2019-06-14 PROCEDURE — 74011000250 HC RX REV CODE- 250: Performed by: INTERNAL MEDICINE

## 2019-06-14 PROCEDURE — 74011250636 HC RX REV CODE- 250/636: Performed by: NURSE PRACTITIONER

## 2019-06-14 PROCEDURE — 83735 ASSAY OF MAGNESIUM: CPT

## 2019-06-14 PROCEDURE — 97116 GAIT TRAINING THERAPY: CPT

## 2019-06-14 PROCEDURE — 83880 ASSAY OF NATRIURETIC PEPTIDE: CPT

## 2019-06-14 PROCEDURE — 65660000001 HC RM ICU INTERMED STEPDOWN

## 2019-06-14 PROCEDURE — 99232 SBSQ HOSP IP/OBS MODERATE 35: CPT | Performed by: INTERNAL MEDICINE

## 2019-06-14 PROCEDURE — 77010033678 HC OXYGEN DAILY

## 2019-06-14 PROCEDURE — 94760 N-INVAS EAR/PLS OXIMETRY 1: CPT

## 2019-06-14 PROCEDURE — 36415 COLL VENOUS BLD VENIPUNCTURE: CPT

## 2019-06-14 PROCEDURE — 74011000250 HC RX REV CODE- 250: Performed by: NURSE PRACTITIONER

## 2019-06-14 PROCEDURE — 85027 COMPLETE CBC AUTOMATED: CPT

## 2019-06-14 PROCEDURE — 74011000258 HC RX REV CODE- 258: Performed by: NURSE PRACTITIONER

## 2019-06-14 PROCEDURE — 80053 COMPREHEN METABOLIC PANEL: CPT

## 2019-06-14 RX ADMIN — ASPIRIN 81 MG: 81 TABLET ORAL at 09:09

## 2019-06-14 RX ADMIN — Medication 10 ML: at 21:20

## 2019-06-14 RX ADMIN — AMIODARONE HYDROCHLORIDE 200 MG: 200 TABLET ORAL at 17:53

## 2019-06-14 RX ADMIN — Medication 10 ML: at 05:29

## 2019-06-14 RX ADMIN — IRON SUCROSE 200 MG: 20 INJECTION, SOLUTION INTRAVENOUS at 11:15

## 2019-06-14 RX ADMIN — Medication 10 ML: at 15:34

## 2019-06-14 RX ADMIN — APIXABAN 5 MG: 5 TABLET, FILM COATED ORAL at 09:09

## 2019-06-14 RX ADMIN — BUMETANIDE 2 MG: 0.25 INJECTION INTRAMUSCULAR; INTRAVENOUS at 09:09

## 2019-06-14 RX ADMIN — AMIODARONE HYDROCHLORIDE 200 MG: 200 TABLET ORAL at 09:09

## 2019-06-14 RX ADMIN — MILRINONE LACTATE IN DEXTROSE 0.25 MCG/KG/MIN: 200 INJECTION, SOLUTION INTRAVENOUS at 19:34

## 2019-06-14 RX ADMIN — CARVEDILOL 12.5 MG: 12.5 TABLET, FILM COATED ORAL at 17:53

## 2019-06-14 RX ADMIN — MILRINONE LACTATE IN DEXTROSE 0.25 MCG/KG/MIN: 200 INJECTION, SOLUTION INTRAVENOUS at 05:28

## 2019-06-14 RX ADMIN — CARVEDILOL 12.5 MG: 12.5 TABLET, FILM COATED ORAL at 09:09

## 2019-06-14 RX ADMIN — BUMETANIDE 0.5 MG/HR: 0.25 INJECTION INTRAMUSCULAR; INTRAVENOUS at 15:31

## 2019-06-14 RX ADMIN — APIXABAN 5 MG: 5 TABLET, FILM COATED ORAL at 21:20

## 2019-06-14 RX ADMIN — SPIRONOLACTONE 25 MG: 25 TABLET ORAL at 09:09

## 2019-06-14 NOTE — PROGRESS NOTES
[address] [facility] [address] [facility] in [place], [place] Inpatient Progress Note Patient name: Meg Vera Patient : 1950 Patient MRN: [id number] Attending MD: Lisa Eden MD 
Date of service: 19 CHIEF COMPLAINT: 
Acute on chronic systolic heart failure 
  PLAN: 
Continue inotrope assisted diuresis until euvolemic; probably 10-12lbs more Once euvolemic and UTI confirmed treated would wean milrinone to off for baseline RHC Then proceed to CRT upgrade with/without inotropic bridge depending on baseline RHC 
EP plans noted to reattempt DCCV after amiodarone load around the same time Continue milrinone to 0.25 mcg/kg/min due to hypotension Change bumex to 0.5mg per hour; goal net negative 2-3 liters Continue spironolactone 25mg daily Continue amiodarone for rate control; may need to reduce coreg dose as we diurese Intolerant to ACE/ARB/ARNi due to renal dysfunction and while aggressively diuresed Will retrieve most recent [facility] results Ambulate daily PT/OT 
  
IMPRESSION: 
Fatigue Shortness of breath Volume overload Pulmonary edema Acute on chronic systolic heart failure Stage C, NYHA class IIIA symptoms Coronary artery disease S/p arrest VFib and subsequent CABG  Sternal wound infection requiring sternectomy Ischemic cardiomyopathy, LVEF 20% 10/18 Atrial fibrillation s/p failed DCCV 6/10/19 Chronic anticoagulation with eliquis S/p ICD 3/21/11 LBBB,  ms Acute on CKD, stage 3 
UTI Urinary retension Anemia, iron deficiency H/o DVT Migraines JACQUE on 19 showed thrombus H/o tobacco abuse Nighttime desaturations, likely JOSE Body mass index is 27.99 kg/m². 
  
INTERVAL HISTORY: 
Shortness of breath at 20 yards Abdominal fullness Leg edema Afebrile -110s/60-90s HR 80s Atrial flutter Amiodarone 0.25 I/O net negative 1.1 liters, urine 2.4 liters WBC 5.4 HGB 10.2  Cr 1.74 from 1.82 Potassium 3.8 Iron sat 11% TSH wnl 
UA WBC >100K 
  
CARDIAC IMAGING: 
Echo (5/31/19) LVEF 21-25%, severely dilated LA, moderately dilated RA 
EKG (6/12/19) atrial flutter with occasional PVCs, LBBB QRS 158ms LHC not in epic 
  
HEMODYNAMICS: 
RHC not done CPEST not done 6MW not done 
  
OTHER IMAGING: 
CT chest (5/31/19) 1. No evidence of pulmonary embolus. 2. Interstitial edema and small bilateral pleural effusions. 3. Mild mediastinal adenopathy. 
  
INTERVAL HISTORY: 
Briefly, this is a 77 y/o pleasant white male with h/o HTN, HL, likey JOSE, CAD s/p cardiac arrest VFib s/p CABG (2011) c/b sternal would infection and sternotomy, ischemic cardiomyopathy LVEF 15-20%, s/p ICD and with LBBB. Patient admitted with acute on chronic systolic heart failure with massive volume overload > 20 lbs, in the setting of atrial fibrillation s/p failed DCCV and new UTI now treated with IV antibiotics. m with normal S1 and S2. No murmurs, gallops or rubs. Abdomen: Soft, no mass or tenderness. No organomegaly or hernia. Bowel sounds present. Genitourinary and rectal: deferred Extremities: No cyanosis, clubbing, or edema. Neurologic: No focal sensory or motor deficits are noted. Grossly intact. Psychiatric: Awake, alert an doriented x 3. Appropriate mood and affect. Skin: Warm, dry and well perfused. No lesions, nodules or rashes are noted. REVIEW OF SYSTEMS: 
General: Denies fever, night sweats. Ear, nose and throat: Denies difficulty hearing, sinus problems, runny nose, post-nasal drip, ringing in ears, mouth sores, loose teeth, ear pain, nosebleeds, sore throate, facial pain or numbess Cardiovascular: see above in the interval history Respiratory: Denies cough, wheezing, sputum production, hemoptysis. Gastrointestinal: Denies heartburn, constipation, intolerance to certain foods, diarrhea, abdominal pain, nausea, vomiting, difficulty swallowing, blood in stool Kidney and bladder: Denies painful urination, frequent urination, urgency, prostate problems and impotence Musculoskeletal: Denies joint pain, muscle weakness Skin and hair: Denies change in existing skin lesions, hair loss or increase, breast changes PAST MEDICAL HISTORY: 
Past Medical History:  
Diagnosis Date  Degenerative disc disease, lumbar  Heart failure ([address]  High cholesterol  Hypertension  Paroxysmal atrial fibrillation ([address] 2019  Spinal stenosis PAST SURGICAL HISTORY: 
Past Surgical History:  
Procedure Laterality Date  HX APPENDECTOMY  HX CORONARY ARTERY BYPASS GRAFT    
 triple  HX HERNIA REPAIR    
 HX IMPLANTABLE CARDIOVERTER DEFIBRILLATOR  NE CARDIOVERSION ELECTIVE ARRHYTHMIA EXTERNAL N/A 6/10/2019 EP CARDIOVERSION [place] [id number], [facility] FAMILY HISTORY: 
History reviewed. No pertinent family history. SOCIAL HISTORY: 
Social History Socioeconomic History  Marital status:  Spouse name: Not on file  Number of children: Not on file  Years of education: Not on file  Highest education level: Not on file Tobacco Use  Smoking status: Former Smoker Last attempt to quit: 2010 Years since quittin.5  Smokeless tobacco: Never Used Substance and Sexual Activity  Alcohol use: Yes Comment: rarely  Drug use: Never LABORATORY RESULTS: 
  
Labs Latest Ref Rng & Units 2019 2019 2019 2019 6/10/2019 2019 2019 WBC 4.1 - 11.1 K/uL 6.2 - - 5.4 - - 5.5  
RBC 4.10 - 5.70 M/uL 3.61(L) - - 3.39(L) - - 3.70(L) Hemoglobin 12.1 - 17.0 g/dL 11. 0(L) - - 10. 2(L) - - 11. 2(L) Hematocrit 36.6 - 50.3 % 34. 3(L) - - 32. 1(L) - - 35. 2(L) MCV 80.0 - 99.0 FL 95.0 - - 94.7 - - 95.1 Platelets 430 - 391 K/uL 293 - - 207 - - 190 Lymphocytes 12 - 49 % - - - 14 - - 14 Monocytes 5 - 13 % - - - 10 - - 12 Eosinophils 0 - 7 % - - - 2 - - 3  
 Basophils 0 - 1 % - - - 1 - - 1 Bands 0 - 6 % - - - - - - - Albumin 3.5 - 5.0 g/dL 2. 5(L) - - 2. 6(L) - - -  
Calcium 8.5 - 10.1 MG/DL 8.6 8. 1(L) 8.9 8.7 8.7 8.8 8.9 SGOT 15 - 37 U/L 13(L) - - 15 - - -  
Glucose 65 - 100 mg/dL 94 102(H) 134(H) 102(H) 100 99 95 BUN 6 - 20 MG/DL 31(H) 30(H) 32(H) 39(H) 37(H) 37(H) 30(H) Creatinine 0.70 - 1.30 MG/DL 1.74(H) 1.74(H) 1.82(H) 1.89(H) 1.91(H) 1.88(H) 1.82(H) Sodium 136 - 145 mmol/L 137 136 133(L) 136 135(L) 135(L) 134(L) Potassium 3.5 - 5.1 mmol/L 4.1 3.8 3.6 4.5 4.4 4.4 4.4 TSH 0.36 - 3.74 uIU/mL - - - - - - - Some recent data might be hidden Lab Results Component Value Date/Time TSH 2.45 06/01/2019 04:16 AM  
 
 
CURRENT MEDICATIONS: 
 
Current Facility-Administered Medications:  
  iron sucrose (VENOFER) 200 mg in 0.9% sodium chloride 100 mL IVPB, 200 mg, IntraVENous, Q24H, Darline Barrientos NP, Last Rate: 440 mL/hr at 06/14/19 1115, 200 mg at 06/14/19 1115   bumetanide (BUMEX) 0.25 mg/mL infusion, 0.5 mg/hr, IntraVENous, CONTINUOUS, Darline Barrientos NP 
  spironolactone (ALDACTONE) tablet 25 mg, 25 mg, Oral, DAILY, Aury David MD, 25 mg at 06/14/19 0909 
  milrinone (PRIMACOR) 20 MG/100 ML D5W infusion, 0.25 mcg/kg/min, IntraVENous, CONTINUOUS, Darline Barrientos NP, Last Rate: 7.5 mL/hr at 06/14/19 0528, 0.25 mcg/kg/min at 06/14/19 6066   melatonin tablet 3 mg, 3 mg, Oral, QHS PRN, Trina Sanchez MD, 3 mg at 06/09/19 2125 
  amiodarone (CORDARONE) tablet 200 mg, 200 mg, Oral, BID, Aury David MD, 200 mg at 06/14/19 0752   apixaban (ELIQUIS) tablet 5 mg, 5 mg, Oral, Q12H, Lizet Beal MD, 5 mg at 06/14/19 7372   aspirin delayed-release tablet 81 mg, 81 mg, Oral, DAILY, Lizet Beal MD, 81 mg at 06/14/19 0468   carvedilol (COREG) tablet 12.5 mg, 12.5 mg, Oral, BID WITH MEALS, Aury David MD, 12.5 mg at 06/14/19 9581   sodium chloride (NS) flush 5-40 mL, 5-40 mL, IntraVENous, Q8H, Lian Lizet DIANA MD, 10 mL at 06/14/19 0529 
  sodium chloride (NS) flush 5-40 mL, 5-40 mL, IntraVENous, PRN, Lizet Beal MD, 10 mL at 06/03/19 5658   ondansetron (ZOFRAN) injection 4 mg, 4 mg, IntraVENous, Q4H PRN, Lizet Beal MD 
  bisacodyl (DULCOLAX) tablet 5 mg, 5 mg, Oral, DAILY PRN, Lizet eBal MD 
  acetaminophen (TYLENOL) tablet 650 mg, 650 mg, Oral, Q4H PRN, Lizet Beal MD, 650 mg at 06/05/19 1642   morphine injection 2 mg, 2 mg, IntraVENous, Q4H PRN, Lizet Beal MD 
 
 
Thank you for allowing me to participate in this patient's care. Sue Encinas MD PhD 
[facility] [address] [place] [address], [address] Phone: [phone] Fax: [phone]

## 2019-06-14 NOTE — PROGRESS NOTES
Problem: Heart Failure: Discharge Outcomes Goal: *Demonstrates ability to perform prescribed activity without shortness of breath or discomfort Outcome: Progressing Towards Goal 
Note:  
Patient able to ambulate to the chair and use the urinal with stand by assistance. Patient has no shortness of breath during activity on 2L nasal cannula. Goal: *Verbalizes understanding/describes prescribed medications Outcome: Progressing Towards Goal 
Note:  
Patient started on a Bumex drip today. Patient educated on Bumex prior to administration of the Bumex drip. Patient verbalized understanding. Will continue to educate.

## 2019-06-14 NOTE — PROGRESS NOTES
Last 3 Recorded Weights in this Encounter 06/12/19 1664 06/13/19 9258 06/14/19 0883 Weight: 100.8 kg (222 lb 4.8 oz) 98.9 kg (218 lb) 99.6 kg (219 lb 9.3 oz) Diuresing pt

## 2019-06-14 NOTE — PROGRESS NOTES
NUTRITION Pt seen for:    
[]           Supplements  []             PO intake check  
[]           Food Allergies  []             Food Preferences/tolerances [x]           Rescreen   []             Education []           Diet order clarification []             Other RECOMMENDATIONS:  
Continue diet as ordered Continue daily weights SUBJECTIVE/OBJECTIVE:  
Chart reviewed. Pt continues to eat % of all meals without issues. No risk for malnutrition noted at this time. Will rescreen as indicated. Diet:  Cardiac Regular 2 gm Sodium Intake: [x]           Good     []           Fair      []           Poor Patient Vitals for the past 100 hrs: 
 % Diet Eaten 06/13/19 1835 75 % 06/13/19 1200 75 % 06/13/19 0800 75 % 06/12/19 1925 75 % 06/12/19 1200 75 % 06/12/19 0800 100 % 06/11/19 1835 100 % 06/11/19 1200 100 % 06/11/19 0800 75 % Weight Changes:  
Last 3 Recorded Weights in this Encounter 06/12/19 6973 06/13/19 4685 06/14/19 3960 Weight: 100.8 kg (222 lb 4.8 oz) 98.9 kg (218 lb) 99.6 kg (219 lb 9.3 oz) Wt Readings from Last 10 Encounters:  
06/14/19 99.6 kg (219 lb 9.3 oz) 05/01/19 96.2 kg (212 lb) 04/02/19 97.5 kg (215 lb) Nutrition Problems Identified: 
[x]      None PLAN:  
[]           Obtained/adjusted food preferences/tolerances and/or snacks options []           Dislikes supplements will try a substitution []           Modify diet for food allergies []           Adjust texture due to difficulty chewing [x]    Continue current diet 
[]           Educated patient 
[]           Add Supplements Cloteal Bakari, [address] [facility]

## 2019-06-14 NOTE — PROGRESS NOTES
Problem: Mobility Impaired (Adult and Pediatric) Goal: *Acute Goals and Plan of Care (Insert Text) Description Physical Therapy Goals Revised 6/14/19 1. Patient will ambulate with modified independence for 400 feet with the least restrictive device within 7 day(s). Initiated 6/7/2019 1. Patient will ambulate with modified independence for 400 feet with the least restrictive device within 7 day(s). 6/14/2019 1523 by David Phillips, PT Outcome: Progressing Towards Goal 
 PHYSICAL THERAPY TREATMENT: WEEKLY REASSESSMENT Patient: Martha Gallagher (77 y.o. male) Date: 6/14/2019 Diagnosis: Acute on chronic systolic CHF (congestive heart failure) ([facility]) [I50.23] Acute on chronic systolic CHF (congestive heart failure) ([facility] Procedure(s) (LRB): 
EP CARDIOVERSION (N/A) 4 Days Post-Op Precautions:   
Chart, physical therapy assessment, plan of care and goals were reviewed. ASSESSMENT: 
Patient is received supine in bed. He is SBA for sit to stand at rolling walker. He ambulates with CGA and increased gait speed one lap around the unit. Patient demonstrates mild increased trunk sway and decreased bilateral step clearance. Patient reports he does not use an assistive device at home but that his balance is impaired. He ambulates 100 ft this session with CGA and no assistive device. Patient demonstrates increased trunk sway without an assistive device. He also reports increased fatigue. Patient O2 sats are 95% on 2L during mobility this session. He comes to rest supine in bed after gait and demonstrates increased work of breathing. [facility] pending progression of acute care. Patient's progression toward goals since last assessment: Patient continues to make progress towards his goal of ambulating 100 ft. He continues to require supplemental O2 and would benefit from an assistive device for mobility.    
 
PLAN: 
 Goals have been updated based on progression since last assessment. Patient continues to benefit from skilled intervention to address the above impairments. Continue to follow the patient 5 times a week to address goals. Planned Interventions: 
?              Bed Mobility Training             ? Neuromuscular Re-Education ? Transfer Training                   ? Orthotic/Prosthetic Training 
? Gait Training                         ? Modalities ? Therapeutic Exercises           ? Edema Management/Control ? Therapeutic Activities            ? Patient and Family Training/Education ? Other (comment): 
Discharge Recommendations: Home Health Further Equipment Recommendations for Discharge: TBD SUBJECTIVE:  
Patient stated ? This A-fib has got me all out of whack. ? OBJECTIVE DATA SUMMARY:  
Critical Behavior: 
Neurologic State: Alert, Appropriate for age Orientation Level: Oriented X4 Cognition: Appropriate decision making Strength:  
Strength: Generally decreased, functional 
  
  
  
  
  
  
 
Functional Mobility Training: 
Bed Mobility: 
  
Supine to Sit: Modified independent Sit to Supine: Independent Transfers: 
Sit to Stand: Stand-by assistance Stand to Sit: Stand-by assistance Balance: 
Sitting: Intact Standing: Impaired; Without support Standing - Static: Good Standing - Dynamic : Fair Ambulation/Gait Training: 
Distance (ft): 425 Feet (ft) Assistive Device: Gait belt;Walker, rolling Ambulation - Level of Assistance: Contact guard assistance Gait Abnormalities: Decreased step clearance; Path deviations;Trunk sway increased Stairs: 
  
  
  
Neuro Re-Education: 
 
Therapeutic Exercises:  
 
Pain: 
Pain Scale 1: Numeric (0 - 10) Pain Intensity 1: 0 Activity Tolerance: Patient demonstrates good activity tolerance with gait training. He demonstrates increased work of breathing but O2 sats are 95% Please refer to the flowsheet for vital signs taken during this treatment. After treatment:  
?  Patient left in no apparent distress sitting up in chair ? Patient left in no apparent distress in bed 
? Call bell left within reach ? Nursing notified ? Caregiver present- wife ? Bed alarm activated COMMUNICATION/COLLABORATION:  
The patient?s plan of care was discussed with: Registered Nurse Curtis Yeung, PT Time Calculation: 23 mins

## 2019-06-14 NOTE — PROGRESS NOTES
Hospitalist Progress Note Zhao Collins MD 
Please call  and page for questions. Call physician on-call through the  7pm-7am 
 
Daily Progress Note: 6/14/2019 Primary care Jose Sanon MD 
 
Date of admission: 5/31/2019  7:03 PM 
 
Admission summery and hospital course: 
27-year-old man with past medical history significant for chronic systolic congestive heart failure, dyslipidemia, hypertension, atrial fibrillation status post cardioversion, was in his usual state of health until the day of presentation at the emergency room when the patient developed worsening of his shortness of breath.  The inpatient underwent cardioversion early this morning by his cardiologist. 
Elizabet Cynthia started on 06/05. 
  
Subjective:  
 
6/14/2019 Off bipap last night Lizama out , dark urine Feels overall better. Assessment/Plan:  
Acute-on-chronic systolic congestive heart failure NYHA 2-3  
JACQUE on 05/31 with EF of 21%-25%.      
Entresto hold for low BP for increased creatinine. Continue diuresis, amiodarone, Bumex, coreg, IV dobutamin and O2 as needed. CTA of the chest is negative for pulmonary embolism.  Appreciated Dr Nielson/Dr Olvera input. Aldactone on hold now.   
Patient needs 6 minutes walk prior to discharge.  
Will continue to follow cardiology recommendations. conton milronone qtts adjusted by AHF team  
 
Aflutter/afibe, had CC to NSR a few days ago 6/5 +/- n6/13 in aflutter; 6/14/2019 afib 
  
Atrial fibrillation, status post cardioversion.   
Pt still in Afib. Will undergo cardioversion today as per cardiology. Meanwhile continue amiodarone, coreg. On Eliquis for anticoagulation. On pressors ; afib continues 6/14/2019  
  
Hypertension. Hold On entresto. BP is reasonable now.  
BP Readings from Last 1 Encounters:  
06/14/19 114/67  
  
  
Acute kidney injury.   
Creatinine is improving.   Most likely as a result of the diuretic therapy and or entresto.  Renal US on  was normal.  
Lab Results Component Value Date/Time Creatinine 1.74 (H) 2019 03:39 AM  
 lab for  planned. UTI: Tmax as 100 on  PM. Afebrile since than. Continue to monitor with Rocephin.  
Ucx c/w Enterobacter Cloacae Treated neal out  DANIELS< insetting of BPH, neal in several days and out as of . Insomnia: Will increase the melatonin. Had good night sleep last pm 2019  
  
See orders for other plans. VTE prophylaxis: On Eliquis.  
Code status: Full Discussed plan of care with Patient/Family and Nurse. Patient wife is at the bedside.  
Pre-admission lived at home. Discharge planning: pending Review of Systems:  
 
Review of Systems: 
Symptom  Y/N  Comments   Symptom  Y/N  Comments Fever/Chills   n   Chest Pain  n   
Poor Appetite  n    Edema  n    
Cough  n   Abdominal Pain  n    
Sputum  n   Joint Pain SOB/DOUGLAS  y Improved   Pruritis/Rash Nausea/vomit  n   Tolerating PT/OT Diarrhea     Tolerating Diet Constipation     Other Could not obtain due to:    
 
 
Objective:  
Physical Exam:  
 
Visit Vitals /67 (BP 1 Location: Left arm, BP Patient Position: Sitting) Pulse 82 Temp 98 °F (36.7 °C) Resp 17 Wt 99.6 kg (219 lb 9.3 oz) SpO2 94% BMI 28.19 kg/m² O2 Flow Rate (L/min): 2 l/min O2 Device: Nasal cannula Temp (24hrs), Av.3 °F (36.8 °C), Min:97.9 °F (36.6 °C), Max:98.8 °F (37.1 °C) No intake/output data recorded.  1901 -  0700 In: 1760.1 [P.O.:1440; I.V.:320.1] Out: 4030 [VQCBB:2362] Stable exam.2019 General:  Alert, cooperative, no distress, appears stated age. Lungs:   Nearly clear eldon bases Heart:  Regular rate and rhythm, S1, S2 normal, no murmur.   
Abdomen:   Soft, non-tender. Bowel sounds normal.   
Extremities: Extremities normal, atraumatic, no cyanosis. Edema at LEs. Skin: Skin color, texture, turgor normal. No rashes or lesions Neurologic: Patient was SOB while he walked slowly at the hallway. Patient interacts well. Patient voice is clear.   
  
Data Review:  
   
Recent Days: 
No results for input(s): WBC, HGB, HCT, PLT, HGBEXT, HCTEXT, PLTEXT, HGBEXT, HCTEXT, PLTEXT in the last 72 hours. Recent Labs  
  06/14/19 
0339 06/13/19 
0413 06/12/19 
0932  136 133* K 4.1 3.8 3.6  100 97 CO2 29 29 29 GLU 94 102* 134* BUN 31* 30* 32* CREA 1.74* 1.74* 1.82* CA 8.6 8.1* 8.9 MG 2.3  --   --   
ALB 2.5*  --   --   
SGOT 13*  --   --   
ALT 20  --   -- No results for input(s): PH, PCO2, PO2, HCO3, FIO2 in the last 72 hours. 24 Hour Results: 
Recent Results (from the past 24 hour(s)) HEPATITIS PANEL, ACUTE Collection Time: 06/13/19 11:40 AM  
Result Value Ref Range Hepatitis A, IgM NONREACTIVE NR    
 __ Hepatitis B surface Ag <0.10 Index Hep B surface Ag Interp. NEGATIVE  NEG    
 __ Hepatitis B core, IgM NONREACTIVE NR    
 __ Hep C  virus Ab Interp. NONREACTIVE NR Hep C  virus Ab comment Method used is [employer] HEP B SURFACE AB Collection Time: 06/13/19 11:40 AM  
Result Value Ref Range Hepatitis B surface Ab 3.25 mIU/mL Hep B surface Ab Interp. NONREACTIVE NR    
HEP B SURFACE AG Collection Time: 06/13/19 11:40 AM  
Result Value Ref Range Hepatitis B surface Ag <0.10 Index Hep B surface Ag Interp. NEGATIVE  NEG    
HEPATITIS C AB Collection Time: 06/13/19 11:40 AM  
Result Value Ref Range Hep C  virus Ab Interp. NONREACTIVE NR Hep C  virus Ab comment Method used is [facility] IRON PROFILE Collection Time: 06/13/19 11:40 AM  
Result Value Ref Range Iron 27 (L) 35 - 150 ug/dL TIBC 236 (L) 250 - 450 ug/dL Iron % saturation 11 (L) 20 - 50 % FERRITIN Collection Time: 06/13/19 11:40 AM  
Result Value Ref Range Ferritin 244 26 - 388 NG/ML  
NT-PRO BNP Collection Time: 06/14/19  3:39 AM  
Result Value Ref Range NT pro-BNP 6,493 (H) <125 PG/ML  
MAGNESIUM Collection Time: 06/14/19  3:39 AM  
Result Value Ref Range Magnesium 2.3 1.6 - 2.4 mg/dL METABOLIC PANEL, COMPREHENSIVE Collection Time: 06/14/19  3:39 AM  
Result Value Ref Range Sodium 137 136 - 145 mmol/L Potassium 4.1 3.5 - 5.1 mmol/L Chloride 100 97 - 108 mmol/L  
 CO2 29 21 - 32 mmol/L Anion gap 8 5 - 15 mmol/L Glucose 94 65 - 100 mg/dL BUN 31 (H) 6 - 20 MG/DL Creatinine 1.74 (H) 0.70 - 1.30 MG/DL  
 BUN/Creatinine ratio 18 12 - 20 GFR est AA 48 (L) >60 ml/min/1.73m2 GFR est non-AA 39 (L) >60 ml/min/1.73m2 Calcium 8.6 8.5 - 10.1 MG/DL Bilirubin, total 0.4 0.2 - 1.0 MG/DL  
 ALT (SGPT) 20 12 - 78 U/L  
 AST (SGOT) 13 (L) 15 - 37 U/L Alk. phosphatase 100 45 - 117 U/L Protein, total 6.5 6.4 - 8.2 g/dL Albumin 2.5 (L) 3.5 - 5.0 g/dL Globulin 4.0 2.0 - 4.0 g/dL A-G Ratio 0.6 (L) 1.1 - 2.2 SAMPLES BEING HELD Collection Time: 06/14/19  3:39 AM  
Result Value Ref Range SAMPLES BEING HELD 1PST 1LAV   
 COMMENT Add-on orders for these samples will be processed based on acceptable specimen integrity and analyte stability, which may vary by analyte. Problem List: 
Problem List as of 6/14/2019 Date Reviewed: 6/10/2019 Codes Class Noted - Resolved * (Principal) Acute on chronic systolic CHF (congestive heart failure) (HCC) ICD-10-CM: J71.08 ICD-9-CM: 428.23, 428.0  5/31/2019 - Present Paroxysmal atrial fibrillation (HCC) ICD-10-CM: I48.0 ICD-9-CM: 427.31  4/2/2019 - Present Medications reviewed Current Facility-Administered Medications Medication Dose Route Frequency  iron sucrose (VENOFER) 200 mg in 0.9% sodium chloride 100 mL IVPB  200 mg IntraVENous Q24H  
 bumetanide (BUMEX) injection 2 mg  2 mg IntraVENous BID  
  spironolactone (ALDACTONE) tablet 25 mg  25 mg Oral DAILY  milrinone (PRIMACOR) 20 MG/100 ML D5W infusion  0.25 mcg/kg/min IntraVENous CONTINUOUS  
 melatonin tablet 3 mg  3 mg Oral QHS PRN  
 amiodarone (CORDARONE) tablet 200 mg  200 mg Oral BID  
 apixaban (ELIQUIS) tablet 5 mg  5 mg Oral Q12H  aspirin delayed-release tablet 81 mg  81 mg Oral DAILY  carvedilol (COREG) tablet 12.5 mg  12.5 mg Oral BID WITH MEALS  sodium chloride (NS) flush 5-40 mL  5-40 mL IntraVENous Q8H  
 sodium chloride (NS) flush 5-40 mL  5-40 mL IntraVENous PRN  
 ondansetron (ZOFRAN) injection 4 mg  4 mg IntraVENous Q4H PRN  
 bisacodyl (DULCOLAX) tablet 5 mg  5 mg Oral DAILY PRN  
 acetaminophen (TYLENOL) tablet 650 mg  650 mg Oral Q4H PRN  
 morphine injection 2 mg  2 mg IntraVENous Q4H PRN Care Plan discussed with: Patient/Family and Nurse Total time spent with patient: 30 minutes.  
 
Mary Boucher MD

## 2019-06-14 NOTE — PROGRESS NOTES
[facility] Progress Note                                        DADNR Date: 5/31/2019 
  
  
Appreciate input from AHF and EP re patient. Pt reports he decided to sleep without the bipap last night and made it OK Second dose of bumex held due to BP Assessment/Plan: # Acute on chronic systolic HF -EF 27% 
- improved initially  with diuretics, but creat increased and BP dropped; changed dobutamine to milrinone monday as BP had increased; increased bumex; on spironolactone; improved diuresis with increased bumex dose as long as BP will tolerate 
-RHC once patient fully diuresed # AF -reverted to afib, cont eliquis and amiodarone; in aflutter, CV monday and now back in flutter; may re-cardiovert after another week of increased amiodarone dose 
- Consider ablation in future once he has been optimized.  
  
# LBBB - he has progressed recently with LBBB. Will plan upgrade to BiV ICD/pacer once UTI resolved 
  
# Acute on chronic kidney injury-renal US normal; stabilized, though higher than his baseline; also has some retention and flomax caused hypotension; UO good and creat is improving 
  
# fever- urine culture positive with gram-rods; on rocephin-last day wednesday; low gr temp yest am but not today; repeat culture pending 
  #urinary retention-urology suggests home with neal; has some hematuria which appears mild; neal removed yesterday and so far urinating fine # Anemia-iron deficient; venofer suggested 
  
Exam: 
Blood pressure 114/67, pulse 82, temperature 98 °F (36.7 °C), resp. rate 17, weight 99.6 kg (219 lb 9.3 oz), SpO2 94 %. Decreased BS at both bases Cor irreg with S3 Ext with 2+ edema Labs: 
Cr 1.74  
K 4.1 Dhaval Coates MD

## 2019-06-14 NOTE — PROGRESS NOTES
Chart reviewed and I spoke with Dr. Rachele Nageotte (cardiology) today regarding his EP/Arrhythmia optins. This is a 69M with a chronic ischemic cardiomyopathy, LBBB QRS duration 158 msec, and chronic systolic CHF. Admitted with class 4 symptoms and has had issues with atrial arrhythmia as well. There has been difficulty optimizing his medical regimen for CHF due to hypotension and ARF. In the future, he would be a candidate for BIV-ICD upgrade to his current single chamber system to optimize his CHF regimen, but unfortunately, has had GNR positive on urine culture (enterobacter cloacae) and clinical syndrome consistent with complex UTI, seen by urology consult. I don't think this is a problem regarding risk for endocarditis at this time, but a JACQUE is indicated if he has persistent fevers on antibiotic therapy. Device upgrade from his single chamber ICD will have to be postponed until this is cleared. Regarding Afib ablation, not a candidate at this time, probably best to get him BIV pacing first and optimize his CHF to maximize his tolerance of a complex ablation procedure for a comprehensive Afib catheter ablation. In the short term, increasing the amio makes since, even if up to 600 mg daily for 4 weeks and then back down to 400 mg daily to try to maintain sinus rhythm if he achieves this. After a week on his escalated amio regimen, a cardioversion can be re-attempted if he remains in atypical atrial flutter. I'll look to see on his progress. Signed By: Alee Martines MD   
 June 13, 2019

## 2019-06-15 LAB
ALBUMIN SERPL-MCNC: 2.8 G/DL (ref 3.5–5)
ALBUMIN/GLOB SERPL: 0.7 {RATIO} (ref 1.1–2.2)
ALP SERPL-CCNC: 94 U/L (ref 45–117)
ALT SERPL-CCNC: 21 U/L (ref 12–78)
ANION GAP SERPL CALC-SCNC: 7 MMOL/L (ref 5–15)
AST SERPL-CCNC: 12 U/L (ref 15–37)
ATRIAL RATE: 89 BPM
BILIRUB SERPL-MCNC: 0.4 MG/DL (ref 0.2–1)
BNP SERPL-MCNC: 5299 PG/ML
BUN SERPL-MCNC: 31 MG/DL (ref 6–20)
BUN/CREAT SERPL: 16 (ref 12–20)
CALCIUM SERPL-MCNC: 8.7 MG/DL (ref 8.5–10.1)
CALCULATED R AXIS, ECG10: 1 DEGREES
CALCULATED T AXIS, ECG11: 132 DEGREES
CHLORIDE SERPL-SCNC: 99 MMOL/L (ref 97–108)
CO2 SERPL-SCNC: 31 MMOL/L (ref 21–32)
CREAT SERPL-MCNC: 1.94 MG/DL (ref 0.7–1.3)
DIAGNOSIS, 93000: NORMAL
ERYTHROCYTE [DISTWIDTH] IN BLOOD BY AUTOMATED COUNT: 13.4 % (ref 11.5–14.5)
GLOBULIN SER CALC-MCNC: 4 G/DL (ref 2–4)
GLUCOSE SERPL-MCNC: 114 MG/DL (ref 65–100)
HCT VFR BLD AUTO: 31.3 % (ref 36.6–50.3)
HGB BLD-MCNC: 10 G/DL (ref 12.1–17)
MAGNESIUM SERPL-MCNC: 2.1 MG/DL (ref 1.6–2.4)
MCH RBC QN AUTO: 29.8 PG (ref 26–34)
MCHC RBC AUTO-ENTMCNC: 31.9 G/DL (ref 30–36.5)
MCV RBC AUTO: 93.2 FL (ref 80–99)
NRBC # BLD: 0 K/UL (ref 0–0.01)
NRBC BLD-RTO: 0 PER 100 WBC
PLATELET # BLD AUTO: 322 K/UL (ref 150–400)
PMV BLD AUTO: 9 FL (ref 8.9–12.9)
POTASSIUM SERPL-SCNC: 3.6 MMOL/L (ref 3.5–5.1)
PROT SERPL-MCNC: 6.8 G/DL (ref 6.4–8.2)
Q-T INTERVAL, ECG07: 476 MS
QRS DURATION, ECG06: 160 MS
QTC CALCULATION (BEZET), ECG08: 528 MS
RBC # BLD AUTO: 3.36 M/UL (ref 4.1–5.7)
SODIUM SERPL-SCNC: 137 MMOL/L (ref 136–145)
VENTRICULAR RATE, ECG03: 74 BPM
WBC # BLD AUTO: 5.4 K/UL (ref 4.1–11.1)

## 2019-06-15 PROCEDURE — 74011000250 HC RX REV CODE- 250: Performed by: NURSE PRACTITIONER

## 2019-06-15 PROCEDURE — 83735 ASSAY OF MAGNESIUM: CPT

## 2019-06-15 PROCEDURE — 85027 COMPLETE CBC AUTOMATED: CPT

## 2019-06-15 PROCEDURE — 94660 CPAP INITIATION&MGMT: CPT

## 2019-06-15 PROCEDURE — 93005 ELECTROCARDIOGRAM TRACING: CPT

## 2019-06-15 PROCEDURE — 65660000001 HC RM ICU INTERMED STEPDOWN

## 2019-06-15 PROCEDURE — 80053 COMPREHEN METABOLIC PANEL: CPT

## 2019-06-15 PROCEDURE — 74011250636 HC RX REV CODE- 250/636: Performed by: NURSE PRACTITIONER

## 2019-06-15 PROCEDURE — 83880 ASSAY OF NATRIURETIC PEPTIDE: CPT

## 2019-06-15 PROCEDURE — 77010033678 HC OXYGEN DAILY

## 2019-06-15 PROCEDURE — 74011250637 HC RX REV CODE- 250/637: Performed by: NURSE PRACTITIONER

## 2019-06-15 PROCEDURE — 74011000258 HC RX REV CODE- 258: Performed by: NURSE PRACTITIONER

## 2019-06-15 PROCEDURE — 74011250637 HC RX REV CODE- 250/637: Performed by: INTERNAL MEDICINE

## 2019-06-15 PROCEDURE — 36415 COLL VENOUS BLD VENIPUNCTURE: CPT

## 2019-06-15 RX ORDER — POTASSIUM CHLORIDE 750 MG/1
40 TABLET, FILM COATED, EXTENDED RELEASE ORAL
Status: COMPLETED | OUTPATIENT
Start: 2019-06-15 | End: 2019-06-15

## 2019-06-15 RX ADMIN — CARVEDILOL 12.5 MG: 12.5 TABLET, FILM COATED ORAL at 09:09

## 2019-06-15 RX ADMIN — AMIODARONE HYDROCHLORIDE 200 MG: 200 TABLET ORAL at 09:08

## 2019-06-15 RX ADMIN — BUMETANIDE 0.5 MG/HR: 0.25 INJECTION INTRAMUSCULAR; INTRAVENOUS at 10:04

## 2019-06-15 RX ADMIN — MILRINONE LACTATE IN DEXTROSE 0.25 MCG/KG/MIN: 200 INJECTION, SOLUTION INTRAVENOUS at 09:12

## 2019-06-15 RX ADMIN — ASPIRIN 81 MG: 81 TABLET ORAL at 09:08

## 2019-06-15 RX ADMIN — Medication 10 ML: at 05:28

## 2019-06-15 RX ADMIN — IRON SUCROSE 200 MG: 20 INJECTION, SOLUTION INTRAVENOUS at 09:09

## 2019-06-15 RX ADMIN — APIXABAN 5 MG: 5 TABLET, FILM COATED ORAL at 21:09

## 2019-06-15 RX ADMIN — APIXABAN 5 MG: 5 TABLET, FILM COATED ORAL at 09:08

## 2019-06-15 RX ADMIN — Medication 10 ML: at 14:51

## 2019-06-15 RX ADMIN — SPIRONOLACTONE 25 MG: 25 TABLET ORAL at 09:09

## 2019-06-15 RX ADMIN — CARVEDILOL 12.5 MG: 12.5 TABLET, FILM COATED ORAL at 17:11

## 2019-06-15 RX ADMIN — Medication 10 ML: at 21:09

## 2019-06-15 RX ADMIN — AMIODARONE HYDROCHLORIDE 200 MG: 200 TABLET ORAL at 17:11

## 2019-06-15 RX ADMIN — POTASSIUM CHLORIDE 40 MEQ: 750 TABLET, FILM COATED, EXTENDED RELEASE ORAL at 14:50

## 2019-06-15 NOTE — PROGRESS NOTES
Hospitalist Progress Note Michele Cabello MD 
Please call  and page for questions. Call physician on-call through the  7pm-7am 
 
Daily Progress Note: 6/15/2019 Primary care Derick Marrero MD 
 
Date of admission: 5/31/2019  7:03 PM 
 
Admission summery and hospital course: 
60-year-old man with past medical history significant for chronic systolic congestive heart failure, dyslipidemia, hypertension, atrial fibrillation status post cardioversion, was in his usual state of health until the day of presentation at the emergency room when the patient developed worsening of his shortness of breath.  The inpatient underwent cardioversion early this morning by his cardiologist. 
Jens Castroters started on 06/05. 
  
Subjective:  
 
6/15/2019 On BIPAP x 3 hours last pm 
Exam much clearer lungs than prior days Assessment/Plan:  
Acute-on-chronic systolic congestive heart failure NYHA 2-3  
JACQUE on 05/31 with EF of 21%-25%.      
Entresto hold for low BP for increased creatinine. Continue diuresis, amiodarone, Bumex, coreg, IV dobutamin and O2 as needed. CTA of the chest is negative for pulmonary embolism.  Appreciated Dr Nielson/Dr Olvera input. Aldactone on hold now.   
Patient needs 6 minutes walk prior to discharge.  
Will continue to follow cardiology recommendations. conton milronone qtts adjusted by AHF team  
 
Afllisbet/afibe, had CC to NSR a few days ago 6/5 +/- n6/13 in aflutter; 6/14/2019 afib 
  
Atrial fibrillation, status post cardioversion.   
Pt still in Afib. Will undergo cardioversion today as per cardiology. Meanwhile continue amiodarone, coreg. On Eliquis for anticoagulation. On pressors ; afib continues 6/14/2019  
  
Hypertension. Hold On entresto. BP is reasonable now.  
BP Readings from Last 1 Encounters:  
06/15/19 107/71  
  
  
Acute kidney injury.   
Creatinine is improving.   Most likely as a result of the diuretic therapy and or entresto.  Renal US on  was normal.  
Lab Results Component Value Date/Time Creatinine 1.94 (H) 06/15/2019 04:40 AM  
 lab for  planned. UTI: Tmax as 100 on  PM. Afebrile since than. Continue to monitor with Rocephin.  
Ucx c/w Enterobacter Cloacae Treated neal out  DANIELS< insetting of BPH, neal in several days and out as of . And cont out Insomnia: Will increase the melatonin. Had good night sleep last pm 2019  
  
See orders for other plans. VTE prophylaxis: On Eliquis.  
Code status: Full Discussed plan of care with Patient/Family and Nurse. Patient wife is at the bedside.  
Pre-admission lived at home. Discharge planning: pending Review of Systems:  
 
Review of Systems: 
Symptom  Y/N  Comments   Symptom  Y/N  Comments Fever/Chills   n   Chest Pain  n   
Poor Appetite  n    Edema  n    
Cough  n   Abdominal Pain  n    
Sputum  n   Joint Pain SOB/DOUGLAS  y Improved   Pruritis/Rash Nausea/vomit  n   Tolerating PT/OT Diarrhea     Tolerating Diet  y Constipation     Other Could not obtain due to:    
 
 
Objective:  
Physical Exam:  
 
Visit Vitals /71 (BP 1 Location: Right arm, BP Patient Position: At rest) Pulse 79 Temp 99.7 °F (37.6 °C) Resp 18 Wt 97.4 kg (214 lb 11.2 oz) SpO2 96% BMI 27.57 kg/m² O2 Flow Rate (L/min): 2 l/min O2 Device: Nasal cannula Temp (24hrs), Av.6 °F (37 °C), Min:98.1 °F (36.7 °C), Max:99.7 °F (37.6 °C) 
  06/15 0701 - 06/15 1900 In: 720 [P.O.:720] Out: 1700 [Urine:1700]   1901 - 06/15 0700 In: 620 [P.O.:620] Out: Tesha Nicholas [[id number]] Stable exam.6/15/2019 General:  Alert, cooperative, no distress, appears stated age. Lungs:     clear eldon bases Heart:  Regular rate and rhythm, S1, S2 normal, no murmur.   
Abdomen:   Soft, non-tender. Bowel sounds normal.   
Extremities: Extremities normal, atraumatic, no cyanosis. Edema at LEs. Skin: Skin color, texture, turgor normal. No rashes or lesions Neurologic: Patient was SOB while he walked slowly at the hallway. Patient interacts well. Patient voice is clear.   
  
Data Review:  
   
Recent Days: 
Recent Labs  
  06/15/19 
0431 06/14/19 
1205 WBC 5.4 6.2 HGB 10.0* 11.0*  
HCT 31.3* 34.3*  
 293 Recent Labs  
  06/15/19 
0440 06/14/19 
1205 06/14/19 
1511  137 137  
K 3.6 4.4 4.1 CL 99 100 100 CO2 31 32 29 * 106* 94 BUN 31* 30* 31* CREA 1.94* 1.76* 1.74* CA 8.7 9.0 8.6 MG 2.1  --  2.3 ALB 2.8*  --  2.5* SGOT 12*  --  13* ALT 21  --  20 No results for input(s): PH, PCO2, PO2, HCO3, FIO2 in the last 72 hours. 24 Hour Results: 
Recent Results (from the past 24 hour(s)) CBC W/O DIFF Collection Time: 06/15/19  4:31 AM  
Result Value Ref Range WBC 5.4 4.1 - 11.1 K/uL  
 RBC 3.36 (L) 4.10 - 5.70 M/uL  
 HGB 10.0 (L) 12.1 - 17.0 g/dL HCT 31.3 (L) 36.6 - 50.3 % MCV 93.2 80.0 - 99.0 FL  
 MCH 29.8 26.0 - 34.0 PG  
 MCHC 31.9 30.0 - 36.5 g/dL  
 RDW 13.4 11.5 - 14.5 % PLATELET 134 335 - 430 K/uL MPV 9.0 8.9 - 12.9 FL  
 NRBC 0.0 0  WBC ABSOLUTE NRBC 0.00 0.00 - 0.01 K/uL NT-PRO BNP Collection Time: 06/15/19  4:40 AM  
Result Value Ref Range NT pro-BNP 5,299 (H) <125 PG/ML  
MAGNESIUM Collection Time: 06/15/19  4:40 AM  
Result Value Ref Range Magnesium 2.1 1.6 - 2.4 mg/dL METABOLIC PANEL, COMPREHENSIVE Collection Time: 06/15/19  4:40 AM  
Result Value Ref Range Sodium 137 136 - 145 mmol/L Potassium 3.6 3.5 - 5.1 mmol/L Chloride 99 97 - 108 mmol/L  
 CO2 31 21 - 32 mmol/L Anion gap 7 5 - 15 mmol/L Glucose 114 (H) 65 - 100 mg/dL BUN 31 (H) 6 - 20 MG/DL Creatinine 1.94 (H) 0.70 - 1.30 MG/DL  
 BUN/Creatinine ratio 16 12 - 20 GFR est AA 42 (L) >60 ml/min/1.73m2 GFR est non-AA 35 (L) >60 ml/min/1.73m2 Calcium 8.7 8.5 - 10.1 MG/DL  Bilirubin, total 0.4 0.2 - 1.0 MG/DL  
 ALT (SGPT) 21 12 - 78 U/L  
 AST (SGOT) 12 (L) 15 - 37 U/L Alk. phosphatase 94 45 - 117 U/L Protein, total 6.8 6.4 - 8.2 g/dL Albumin 2.8 (L) 3.5 - 5.0 g/dL Globulin 4.0 2.0 - 4.0 g/dL A-G Ratio 0.7 (L) 1.1 - 2.2 EKG, 12 LEAD, INITIAL Collection Time: 06/15/19  5:35 AM  
Result Value Ref Range Ventricular Rate 74 BPM  
 Atrial Rate 89 BPM  
 QRS Duration 160 ms  
 Q-T Interval 476 ms QTC Calculation (Bezet) 528 ms Calculated R Axis 1 degrees Calculated T Axis 132 degrees Diagnosis Atrial fibrillation Left bundle branch block When compared with ECG of 12-JUN-2019 22:31, Atrial fibrillation has replaced Wide QRS rhythm Problem List: 
Problem List as of 6/15/2019 Date Reviewed: 6/10/2019 Codes Class Noted - Resolved * (Principal) Acute on chronic systolic CHF (congestive heart failure) (HCC) ICD-10-CM: I15.35 ICD-9-CM: 428.23, 428.0  5/31/2019 - Present Paroxysmal atrial fibrillation (HCC) ICD-10-CM: I48.0 ICD-9-CM: 427.31  4/2/2019 - Present Medications reviewed Current Facility-Administered Medications Medication Dose Route Frequency  bumetanide (BUMEX) 0.25 mg/mL infusion  0.5 mg/hr IntraVENous CONTINUOUS  
 evolocumab (REPATHA SURECLICK) pen injection 884 mg (Patient Supplied)  140 mg SubCUTAneous Q 14 DAYS  spironolactone (ALDACTONE) tablet 25 mg  25 mg Oral DAILY  milrinone (PRIMACOR) 20 MG/100 ML D5W infusion  0.25 mcg/kg/min IntraVENous CONTINUOUS  
 melatonin tablet 3 mg  3 mg Oral QHS PRN  
 amiodarone (CORDARONE) tablet 200 mg  200 mg Oral BID  
 apixaban (ELIQUIS) tablet 5 mg  5 mg Oral Q12H  aspirin delayed-release tablet 81 mg  81 mg Oral DAILY  carvedilol (COREG) tablet 12.5 mg  12.5 mg Oral BID WITH MEALS  sodium chloride (NS) flush 5-40 mL  5-40 mL IntraVENous Q8H  
 sodium chloride (NS) flush 5-40 mL  5-40 mL IntraVENous PRN  
  ondansetron (ZOFRAN) injection 4 mg  4 mg IntraVENous Q4H PRN  
 bisacodyl (DULCOLAX) tablet 5 mg  5 mg Oral DAILY PRN  
 acetaminophen (TYLENOL) tablet 650 mg  650 mg Oral Q4H PRN  
 morphine injection 2 mg  2 mg IntraVENous Q4H PRN Care Plan discussed with: Patient/Family and Nurse Total time spent with patient: 30 minutes.  
 
Ariel Gore MD

## 2019-06-15 NOTE — PROGRESS NOTES
Bedside shift change report given to [facility] (oncoming nurse) by Niurka Trevizo RN  (offgoing nurse). Report included the following information SBAR.

## 2019-06-15 NOTE — PROGRESS NOTES
Bedside shift change report given to [address] (oncoming nurse) by Wilmer Bowman RN (offgoing nurse). Report included the following information SBAR, Procedure Summary, Intake/Output, Recent Results and Quality Measures.

## 2019-06-15 NOTE — PROGRESS NOTES
[facility] Cardiology Progress Note Date of service: 6/15/2019 Subjective: 
Diuresing well Breathing feels ok Legs less swollen He is on a bumex drip at 0.5mg /hr and milrinone Objective: 
 
Visit Vitals /73 (BP 1 Location: Right arm, BP Patient Position: At rest) Pulse 78 Temp 98.5 °F (36.9 °C) Resp 20 Wt 97.4 kg (214 lb 11.2 oz) SpO2 94% BMI 27.57 kg/m² Physical Exam  
Constitutional: He is oriented to person, place, and time. He appears well-developed and well-nourished. HENT:  
Head: Normocephalic and atraumatic. Eyes: Conjunctivae are normal. No scleral icterus. Neck: No JVD present. Cardiovascular: Normal rate and normal heart sounds. An irregular rhythm present. Exam reveals no gallop. No murmur heard. Pulmonary/Chest: Effort normal and breath sounds normal. No stridor. No respiratory distress. He has no wheezes. He has no rales. Abdominal: Soft. He exhibits no distension. Musculoskeletal: He exhibits edema. He exhibits no deformity. Neurological: He is alert and oriented to person, place, and time. Skin: Skin is warm and dry. Psychiatric: He has a normal mood and affect. His behavior is normal.  
 
 
Data reviewed: 
Recent Results (from the past 12 hour(s)) CBC W/O DIFF Collection Time: 06/15/19  4:31 AM  
Result Value Ref Range WBC 5.4 4.1 - 11.1 K/uL  
 RBC 3.36 (L) 4.10 - 5.70 M/uL  
 HGB 10.0 (L) 12.1 - 17.0 g/dL HCT 31.3 (L) 36.6 - 50.3 % MCV 93.2 80.0 - 99.0 FL  
 MCH 29.8 26.0 - 34.0 PG  
 MCHC 31.9 30.0 - 36.5 g/dL  
 RDW 13.4 11.5 - 14.5 % PLATELET 665 176 - 176 K/uL MPV 9.0 8.9 - 12.9 FL  
 NRBC 0.0 0  WBC ABSOLUTE NRBC 0.00 0.00 - 0.01 K/uL NT-PRO BNP Collection Time: 06/15/19  4:40 AM  
Result Value Ref Range NT pro-BNP 5,299 (H) <125 PG/ML  
MAGNESIUM Collection Time: 06/15/19  4:40 AM  
Result Value Ref Range Magnesium 2.1 1.6 - 2.4 mg/dL METABOLIC PANEL, COMPREHENSIVE  
 Collection Time: 06/15/19  4:40 AM  
Result Value Ref Range Sodium 137 136 - 145 mmol/L Potassium 3.6 3.5 - 5.1 mmol/L Chloride 99 97 - 108 mmol/L  
 CO2 31 21 - 32 mmol/L Anion gap 7 5 - 15 mmol/L Glucose 114 (H) 65 - 100 mg/dL BUN 31 (H) 6 - 20 MG/DL Creatinine 1.94 (H) 0.70 - 1.30 MG/DL  
 BUN/Creatinine ratio 16 12 - 20 GFR est AA 42 (L) >60 ml/min/1.73m2 GFR est non-AA 35 (L) >60 ml/min/1.73m2 Calcium 8.7 8.5 - 10.1 MG/DL Bilirubin, total 0.4 0.2 - 1.0 MG/DL  
 ALT (SGPT) 21 12 - 78 U/L  
 AST (SGOT) 12 (L) 15 - 37 U/L Alk. phosphatase 94 45 - 117 U/L Protein, total 6.8 6.4 - 8.2 g/dL Albumin 2.8 (L) 3.5 - 5.0 g/dL Globulin 4.0 2.0 - 4.0 g/dL A-G Ratio 0.7 (L) 1.1 - 2.2 EKG, 12 LEAD, INITIAL Collection Time: 06/15/19  5:35 AM  
Result Value Ref Range Ventricular Rate 74 BPM  
 Atrial Rate 89 BPM  
 QRS Duration 160 ms  
 Q-T Interval 476 ms QTC Calculation (Bezet) 528 ms Calculated R Axis 1 degrees Calculated T Axis 132 degrees Diagnosis Atrial fibrillation Left bundle branch block When compared with ECG of 12-JUN-2019 22:31, Atrial fibrillation has replaced Wide QRS rhythm Assessment: 1. Acute on chronic systolic heart failure NYHA class IV Improving with inotrope and IV bumex 2. Persistent atrial fibrillation 3. CKD stage III 4. Ischemic Cardiomyopathy 5. CAD s/p CABG Plan: 
Continue milrinone and bumex drips Appreciate CHF team input Signed: 
Monisha Hancock MD 
Interventional Cardiology 6/15/2019

## 2019-06-15 NOTE — PROGRESS NOTES
Problem: Heart Failure: Day 5 Goal: Activity/Safety Outcome: Progressing Towards Goal 
Patient ambulating in the hallway Goal: Diagnostic Test/Procedures Outcome: Progressing Towards Goal 
Daily BNP, CBC, pro BNP, weights daily, strict intake and output Goal: Nutrition/Diet Outcome: Progressing Towards Goal 
Consumes 100% of cardiac diet Goal: Medications Outcome: Progressing Towards Goal 
Bumex drip, milrinone drip Goal: Respiratory Outcome: Progressing Towards Goal 
Lungs diminished on 1L/NC as eded Goal: Treatments/Interventions/Procedures Outcome: Progressing Towards Goal 
Echo, awaiting pacemaker placement 1943-Bedside shift change report given to BRICE Jenkins 20  (oncoming nurse) by Luis Poole (offgoing nurse). Report included the following information SBAR, Intake/Output, MAR and Recent Results.

## 2019-06-15 NOTE — PROGRESS NOTES
Problem: Heart Failure: Day 4 Goal: Diagnostic Test/Procedures Outcome: Progressing Towards Goal 
Note: On Bumex gtt. Last 3 Recorded Weights in this Encounter 06/13/19 [id number] 06/14/19 [id number] 06/15/19 [id number] Weight: 98.9 kg (218 lb) 99.6 kg (219 lb 9.3 oz) 97.4 kg (214 lb 11.2 oz)

## 2019-06-15 NOTE — PROGRESS NOTES
[address] [facility] [place] [address] [facility] [place] in [facility],  E [facility] Inpatient Progress Note Patient name: Natalie Lofton Patient : 1950 Patient MRN: [id number] Attending MD: Wilder Lugo MD 
Date of service: 06/15/19 CHIEF COMPLAINT: 
Acute on chronic systolic heart failure Recent Events: 
Excellent diuresis with current inotropic and diuretic regimen BP stable Cr up slightly  
  
PLAN: 
Continue inotrope assisted diuresis until euvolemic; probably 10-12lbs more Once euvolemic and UTI confirmed treated would wean milrinone to off for baseline RHC Then proceed to CRT upgrade with/without inotropic bridge depending on baseline RHC 
EP plans noted to reattempt DCCV after amiodarone load around the same time Continue milrinone to 0.25 mcg/kg/min due to hypotension Change bumex to 0.25mg per hour; goal net negative 2-3 liters Continue spironolactone 25mg daily Continue amiodarone for rate control; may need to reduce coreg dose as we diurese Intolerant to ACE/ARB/ARNi due to renal dysfunction and while aggressively diuresed Will retrieve most recent [facility] results Ambulate daily PT/OT 
  
IMPRESSION: 
Fatigue Shortness of breath Volume overload Pulmonary edema Acute on chronic systolic heart failure Stage C, NYHA class IIIA symptoms Coronary artery disease S/p arrest VFib and subsequent CABG  Sternal wound infection requiring sternectomy Ischemic cardiomyopathy, LVEF 20% 10/18 Atrial fibrillation s/p failed DCCV 6/10/19 Chronic anticoagulation with eliquis S/p ICD 3/21/11 LBBB,  ms Acute on CKD, stage 3 
UTI Urinary retension Anemia, iron deficiency H/o DVT Migraines JACQUE on 19 showed thrombus H/o tobacco abuse Nighttime desaturations, likely JOSE Body mass index is 27.99 kg/m². 
  
INTERVAL HISTORY: 
Afebrile -110s/60-90s HR 80s Atrial flutter Amiodarone 0.25 I/O net negative 4 liters Potassium 3.6 
  
CARDIAC IMAGING: 
 Echo (5/31/19) LVEF 21-25%, severely dilated LA, moderately dilated RA 
EKG (6/12/19) atrial flutter with occasional PVCs, LBBB QRS 158ms LHC not in epic 
  
HEMODYNAMICS: 
RHC not done CPEST not done 6MW not done 
  
OTHER IMAGING: 
CT chest (5/31/19) 1. No evidence of pulmonary embolus. 2. Interstitial edema and small bilateral pleural effusions. 3. Mild mediastinal adenopathy. 
  
INTERVAL HISTORY: 
Briefly, this is a 77 y/o pleasant white male with h/o HTN, HL, likey JOSE, CAD s/p cardiac arrest VFib s/p CABG (2011) c/b sternal would infection and sternotomy, ischemic cardiomyopathy LVEF 15-20%, s/p ICD and with LBBB. Patient admitted with acute on chronic systolic heart failure with massive volume overload > 20 lbs, in the setting of atrial fibrillation s/p failed DCCV and new UTI now treated with IV antibiotics. Abdomen: Soft, no mass or tenderness. No organomegaly or hernia. Bowel sounds present. Genitourinary and rectal: deferred Extremities: No cyanosis, clubbing, or edema. Neurologic: No focal sensory or motor deficits are noted. Grossly intact. Psychiatric: Awake, alert an doriented x 3. Appropriate mood and affect. Skin: Warm, dry and well perfused. No lesions, nodules or rashes are noted. REVIEW OF SYSTEMS: 
General: Denies fever, night sweats. Ear, nose and throat: Denies difficulty hearing, sinus problems, runny nose, post-nasal drip, ringing in ears, mouth sores, loose teeth, ear pain, nosebleeds, sore throate, facial pain or numbess Cardiovascular: see above in the interval history Respiratory: Denies cough, wheezing, sputum production, hemoptysis. Gastrointestinal: Denies heartburn, constipation, intolerance to certain foods, diarrhea, abdominal pain, nausea, vomiting, difficulty swallowing, blood in stool Kidney and bladder: Denies painful urination, frequent urination, urgency, prostate problems and impotence Musculoskeletal: Denies joint pain, muscle weakness Skin and hair: Denies change in existing skin lesions, hair loss or increase, breast changes PAST MEDICAL HISTORY: 
Past Medical History:  
Diagnosis Date  Degenerative disc disease, lumbar  Heart failure ([facility] [address]  High cholesterol  Hypertension  Paroxysmal atrial fibrillation ([facility] [address] 2019  Spinal stenosis PAST SURGICAL HISTORY: 
Past Surgical History:  
Procedure Laterality Date  HX APPENDECTOMY  HX CORONARY ARTERY BYPASS GRAFT    
 triple  HX HERNIA REPAIR    
 HX IMPLANTABLE CARDIOVERTER DEFIBRILLATOR  NM CARDIOVERSION ELECTIVE ARRHYTHMIA EXTERNAL N/A 6/10/2019 EP CARDIOVERSION performed by Magen Cash MD at [place] [address], [facility] [address] CATH LAB FAMILY HISTORY: 
History reviewed. No pertinent family history. SOCIAL HISTORY: 
Social History Socioeconomic History  Marital status:  Spouse name: Not on file  Number of children: Not on file  Years of education: Not on file  Highest education level: Not on file Tobacco Use  Smoking status: Former Smoker Last attempt to quit: 2010 Years since quittin.5  Smokeless tobacco: Never Used Substance and Sexual Activity  Alcohol use: Yes Comment: rarely  Drug use: Never LABORATORY RESULTS: 
  
Labs Latest Ref Rng & Units 6/15/2019 2019 2019 2019 2019 2019 6/10/2019 WBC 4.1 - 11.1 K/uL 5.4 6.2 - - - 5.4 - RBC 4.10 - 5.70 M/uL 3.36(L) 3.61(L) - - - 3.39(L) - Hemoglobin 12.1 - 17.0 g/dL 10. 0(L) 11. 0(L) - - - 10. 2(L) - Hematocrit 36.6 - 50.3 % 31. 3(L) 34. 3(L) - - - 32. 1(L) -  
MCV 80.0 - 99.0 FL 93.2 95.0 - - - 94.7 - Platelets 737 - 075 K/uL 322 293 - - - 207 - Lymphocytes 12 - 49 % - - - - - 14 - Monocytes 5 - 13 % - - - - - 10 - Eosinophils 0 - 7 % - - - - - 2 - Basophils 0 - 1 % - - - - - 1 -  
Bands 0 - 6 % - - - - - - - Albumin 3.5 - 5.0 g/dL 2. 8(L) - 2. 5(L) - - 2. 6(L) -  
 Calcium 8.5 - 10.1 MG/DL 8.7 9.0 8.6 8. 1(L) 8.9 8.7 8.7 SGOT 15 - 37 U/L 12(L) - 13(L) - - 15 - Glucose 65 - 100 mg/dL 114(H) 106(H) 94 102(H) 134(H) 102(H) 100 BUN 6 - 20 MG/DL 31(H) 30(H) 31(H) 30(H) 32(H) 39(H) 37(H) Creatinine 0.70 - 1.30 MG/DL 1.94(H) 1.76(H) 1.74(H) 1.74(H) 1.82(H) 1.89(H) 1.91(H) Sodium 136 - 145 mmol/L 137 137 137 136 133(L) 136 135(L) Potassium 3.5 - 5.1 mmol/L 3.6 4.4 4.1 3.8 3.6 4.5 4.4 TSH 0.36 - 3.74 uIU/mL - - - - - - - Some recent data might be hidden Lab Results Component Value Date/Time TSH 2.45 06/01/2019 04:16 AM  
 
 
CURRENT MEDICATIONS: 
 
Current Facility-Administered Medications:  
  potassium chloride SR (KLOR-CON 10) tablet 40 mEq, 40 mEq, Oral, NOW, Spenser Herrera, NP 
  bumetanide (BUMEX) 0.25 mg/mL infusion, 0.25 mg/hr, IntraVENous, CONTINUOUS, Spenser Herrera NP, Last Rate: 2 mL/hr at 06/15/19 1004, 0.5 mg/hr at 06/15/19 1004   evolocumab (REPATHA SURECLICK) pen injection 762 mg (Patient Supplied), 140 mg, SubCUTAneous, Q 14 DAYS, Consuelo Mills MD, 140 mg at 06/14/19 1735   spironolactone (ALDACTONE) tablet 25 mg, 25 mg, Oral, DAILY, Liz Keene MD, 25 mg at 06/15/19 0909 
  milrinone (PRIMACOR) 20 MG/100 ML D5W infusion, 0.25 mcg/kg/min, IntraVENous, CONTINUOUS, Ronita Alfonso, NP, Last Rate: 7.5 mL/hr at 06/15/19 0912, 0.25 mcg/kg/min at 06/15/19 0912 
  melatonin tablet 3 mg, 3 mg, Oral, QHS PRN, Desiree Sanchez MD, 3 mg at 06/09/19 2125 
  amiodarone (CORDARONE) tablet 200 mg, 200 mg, Oral, BID, Liz Keene MD, 200 mg at 06/15/19 4753   apixaban (ELIQUIS) tablet 5 mg, 5 mg, Oral, Q12H, Lizet Beal MD, 5 mg at 06/15/19 1280   aspirin delayed-release tablet 81 mg, 81 mg, Oral, DAILY, Lizet Beal MD, 81 mg at 06/15/19 8705   carvedilol (COREG) tablet 12.5 mg, 12.5 mg, Oral, BID WITH MEALS, Liz Keene MD, 12.5 mg at 06/15/19 9900   sodium chloride (NS) flush 5-40 mL, 5-40 mL, IntraVENous, Q8H, Lizet Beal MD, 10 mL at 06/15/19 4085   sodium chloride (NS) flush 5-40 mL, 5-40 mL, IntraVENous, PRN, Lizet Beal MD, 10 mL at 06/03/19 5219   ondansetron (ZOFRAN) injection 4 mg, 4 mg, IntraVENous, Q4H PRN, Lizet Beal MD 
  bisacodyl (DULCOLAX) tablet 5 mg, 5 mg, Oral, DAILY PRN, Lizet Beal MD 
  acetaminophen (TYLENOL) tablet 650 mg, 650 mg, Oral, Q4H PRN, Lizet Beal MD, 650 mg at 06/05/19 1642   morphine injection 2 mg, 2 mg, IntraVENous, Q4H PRN, Lizet Beal MD 
 
 
Thank you for allowing me to participate in this patient's care. Kristine Singh, [address], [address] Phone: [phone] Fax: [phone]

## 2019-06-16 LAB
ALBUMIN SERPL-MCNC: 2.7 G/DL (ref 3.5–5)
ALBUMIN/GLOB SERPL: 0.7 {RATIO} (ref 1.1–2.2)
ALP SERPL-CCNC: 90 U/L (ref 45–117)
ALT SERPL-CCNC: 22 U/L (ref 12–78)
ANION GAP SERPL CALC-SCNC: 7 MMOL/L (ref 5–15)
AST SERPL-CCNC: 15 U/L (ref 15–37)
ATRIAL RATE: 120 BPM
BILIRUB SERPL-MCNC: 0.4 MG/DL (ref 0.2–1)
BNP SERPL-MCNC: 4238 PG/ML
BUN SERPL-MCNC: 31 MG/DL (ref 6–20)
BUN/CREAT SERPL: 16 (ref 12–20)
CALCIUM SERPL-MCNC: 9 MG/DL (ref 8.5–10.1)
CALCULATED R AXIS, ECG10: -10 DEGREES
CALCULATED T AXIS, ECG11: 127 DEGREES
CHLORIDE SERPL-SCNC: 98 MMOL/L (ref 97–108)
CO2 SERPL-SCNC: 32 MMOL/L (ref 21–32)
CREAT SERPL-MCNC: 1.93 MG/DL (ref 0.7–1.3)
DIAGNOSIS, 93000: NORMAL
ERYTHROCYTE [DISTWIDTH] IN BLOOD BY AUTOMATED COUNT: 13.6 % (ref 11.5–14.5)
GLOBULIN SER CALC-MCNC: 3.8 G/DL (ref 2–4)
GLUCOSE SERPL-MCNC: 92 MG/DL (ref 65–100)
HCT VFR BLD AUTO: 35.6 % (ref 36.6–50.3)
HGB BLD-MCNC: 11.6 G/DL (ref 12.1–17)
MAGNESIUM SERPL-MCNC: 2.1 MG/DL (ref 1.6–2.4)
MCH RBC QN AUTO: 30.3 PG (ref 26–34)
MCHC RBC AUTO-ENTMCNC: 32.6 G/DL (ref 30–36.5)
MCV RBC AUTO: 93 FL (ref 80–99)
NRBC # BLD: 0 K/UL (ref 0–0.01)
NRBC BLD-RTO: 0 PER 100 WBC
PLATELET # BLD AUTO: 267 K/UL (ref 150–400)
PMV BLD AUTO: 9.4 FL (ref 8.9–12.9)
POTASSIUM SERPL-SCNC: 4.2 MMOL/L (ref 3.5–5.1)
PROT SERPL-MCNC: 6.5 G/DL (ref 6.4–8.2)
Q-T INTERVAL, ECG07: 440 MS
QRS DURATION, ECG06: 160 MS
QTC CALCULATION (BEZET), ECG08: 495 MS
RBC # BLD AUTO: 3.83 M/UL (ref 4.1–5.7)
SODIUM SERPL-SCNC: 137 MMOL/L (ref 136–145)
VENTRICULAR RATE, ECG03: 76 BPM
WBC # BLD AUTO: 5.7 K/UL (ref 4.1–11.1)

## 2019-06-16 PROCEDURE — 85027 COMPLETE CBC AUTOMATED: CPT

## 2019-06-16 PROCEDURE — 80053 COMPREHEN METABOLIC PANEL: CPT

## 2019-06-16 PROCEDURE — 65660000001 HC RM ICU INTERMED STEPDOWN

## 2019-06-16 PROCEDURE — 74011250636 HC RX REV CODE- 250/636: Performed by: NURSE PRACTITIONER

## 2019-06-16 PROCEDURE — 83735 ASSAY OF MAGNESIUM: CPT

## 2019-06-16 PROCEDURE — 93005 ELECTROCARDIOGRAM TRACING: CPT

## 2019-06-16 PROCEDURE — 74011250637 HC RX REV CODE- 250/637: Performed by: INTERNAL MEDICINE

## 2019-06-16 PROCEDURE — 83880 ASSAY OF NATRIURETIC PEPTIDE: CPT

## 2019-06-16 PROCEDURE — 36415 COLL VENOUS BLD VENIPUNCTURE: CPT

## 2019-06-16 PROCEDURE — 77010033678 HC OXYGEN DAILY

## 2019-06-16 PROCEDURE — 74011000250 HC RX REV CODE- 250: Performed by: NURSE PRACTITIONER

## 2019-06-16 RX ADMIN — AMIODARONE HYDROCHLORIDE 200 MG: 200 TABLET ORAL at 17:12

## 2019-06-16 RX ADMIN — CARVEDILOL 12.5 MG: 12.5 TABLET, FILM COATED ORAL at 08:59

## 2019-06-16 RX ADMIN — APIXABAN 5 MG: 5 TABLET, FILM COATED ORAL at 08:58

## 2019-06-16 RX ADMIN — ASPIRIN 81 MG: 81 TABLET ORAL at 08:58

## 2019-06-16 RX ADMIN — Medication 10 ML: at 13:58

## 2019-06-16 RX ADMIN — APIXABAN 5 MG: 5 TABLET, FILM COATED ORAL at 21:19

## 2019-06-16 RX ADMIN — AMIODARONE HYDROCHLORIDE 200 MG: 200 TABLET ORAL at 08:58

## 2019-06-16 RX ADMIN — BUMETANIDE 0.25 MG/HR: 0.25 INJECTION INTRAMUSCULAR; INTRAVENOUS at 21:19

## 2019-06-16 RX ADMIN — Medication 10 ML: at 21:19

## 2019-06-16 RX ADMIN — Medication 10 ML: at 05:01

## 2019-06-16 RX ADMIN — CARVEDILOL 12.5 MG: 12.5 TABLET, FILM COATED ORAL at 17:12

## 2019-06-16 RX ADMIN — SPIRONOLACTONE 25 MG: 25 TABLET ORAL at 08:58

## 2019-06-16 RX ADMIN — MILRINONE LACTATE IN DEXTROSE 0.25 MCG/KG/MIN: 200 INJECTION, SOLUTION INTRAVENOUS at 11:07

## 2019-06-16 NOTE — PROGRESS NOTES
Problem: Heart Failure: Day 5 Goal: Treatments/Interventions/Procedures Outcome: Progressing Towards Goal 
  
Monitored I&0 patient on bumetanide drip, educated patient on daily weights Will continue to monitor Last 3 Recorded Weights in this Encounter 06/14/19 8516 06/15/19 0356 06/16/19 0350 Weight: 99.6 kg (219 lb 9.3 oz) 97.4 kg (214 lb 11.2 oz) 96.5 kg (212 lb 12.8 oz)

## 2019-06-16 NOTE — PROGRESS NOTES
Hospitalist Progress Note Sasha Bland MD 
Please call  and page for questions. Call physician on-call through the  7pm-7am 
 
Daily Progress Note: 6/16/2019 Primary care Willeen Boast, MD 
 
Date of admission: 5/31/2019  7:03 PM 
 
Admission summery and hospital course: 
55-year-old man with past medical history significant for chronic systolic congestive heart failure, dyslipidemia, hypertension, atrial fibrillation status post cardioversion, was in his usual state of health until the day of presentation at the emergency room when the patient developed worsening of his shortness of breath.  The inpatient underwent cardioversion early this morning by his cardiologist. 
Leslie Gibson started on 06/05. 
  
Subjective:  
 
6/16/2019 Good night Now able to sleep on sides, not back No cp ,no n/v, up in rooms/halls. Assessment/Plan:  
Acute-on-chronic systolic congestive heart failure NYHA 2-3  
JACQUE on 05/31 with EF of 21%-25%.      
Entresto hold for low BP for increased creatinine. Continue diuresis, amiodarone, Bumex, coreg, IV dobutamin and O2 as needed. CTA of the chest is negative for pulmonary embolism.  Appreciated Dr Nielson/Dr Olvera input. Aldactone on hold now.   
Patient needs 6 minutes walk prior to discharge.  
Will continue to follow cardiology recommendations. conton milronone qtts adjusted by AHF team  Cont on pressors/iv bumex, renal numbers bump but stable x 2 at 1.9 /following Aflutter/afibe, had CC to NSR a few days ago 6/5 +/- n6/13 in aflutter; 6/14/2019 afib 
  
Atrial fibrillation, status post cardioversion.   
Pt still in Afib. Will undergo cardioversion today as per cardiology. Meanwhile continue amiodarone, coreg. On Eliquis for anticoagulation. On pressors ; afib continues 6/14/2019  
  
Hypertension. Hold On entresto.  BP is reasonable now.  
BP Readings from Last 1 Encounters:  
06/16/19 110/69  
  
  
 Acute kidney injury.   
Creatinine is improving. Most likely as a result of the diuretic therapy and or entresto.  Renal US on  was normal.  
Lab Results Component Value Date/Time Creatinine 1.93 (H) 2019 05:10 AM  
 lab for  planned. UTI: Tmax as 100 on  PM. Afebrile since than. Continue to monitor with Rocephin.  
Ucx c/w Enterobacter Cloacae Treated neal out  DANIELS< insetting of BPH, neal in several days and out as of . And cont out Insomnia: Will increase the melatonin. Had good night sleep last pm 2019  
  
See orders for other plans. VTE prophylaxis: On Eliquis.  
Code status: Full Discussed plan of care with Patient/Family and Nurse. Patient wife is at the bedside.  
Pre-admission lived at home. Discharge planning: pending Review of Systems:  
 
Review of Systems: 
Symptom  Y/N  Comments   Symptom  Y/N  Comments Fever/Chills   n   Chest Pain  n   
Poor Appetite  n    Edema  n    
Cough  n   Abdominal Pain  n    
Sputum  n   Joint Pain SOB/DOUGLAS  y Improved   Pruritis/Rash Nausea/vomit  n   Tolerating PT/OT Diarrhea     Tolerating Diet  y Constipation     Other Could not obtain due to:    
 
 
Objective:  
Physical Exam:  
 
Visit Vitals /69 Pulse 87 Temp 98 °F (36.7 °C) Resp 16 Wt 96.5 kg (212 lb 12.8 oz) SpO2 95% BMI 27.32 kg/m² O2 Flow Rate (L/min): 2 l/min O2 Device: Nasal cannula Temp (24hrs), Av.7 °F (37.1 °C), Min:98 °F (36.7 °C), Max:99.7 °F (37.6 °C) No intake/output data recorded.  1901 -  0700 In: 1500 [P.O.:1500] Out: 6925 [CJXSN:2307] Stable exam.2019 General:  Alert, cooperative, no distress, appears stated age. Lungs:     clear eldon bases Heart:  Regular rate and rhythm, S1, S2 normal, no murmur.   
Abdomen:   Soft, non-tender. Bowel sounds normal.   
Extremities: Extremities normal, atraumatic, no cyanosis. Edema at LEs. Skin: Skin color, texture, turgor normal. No rashes or lesions Neurologic: Patient was SOB while he walked slowly at the hallway. Patient interacts well. Patient voice is clear.   
  
Data Review:  
   
Recent Days: 
Recent Labs  
  06/16/19 
0507 06/15/19 
0431 06/14/19 
1205 WBC 5.7 5.4 6.2 HGB 11.6* 10.0* 11.0*  
HCT 35.6* 31.3* 34.3*  
 322 293 Recent Labs  
  06/16/19 
0510 06/15/19 
0440 06/14/19 
1205 06/14/19 
3801  137 137 137  
K 4.2 3.6 4.4 4.1 CL 98 99 100 100 CO2 32 31 32 29 GLU 92 114* 106* 94 BUN 31* 31* 30* 31* CREA 1.93* 1.94* 1.76* 1.74* CA 9.0 8.7 9.0 8.6 MG 2.1 2.1  --  2.3 ALB 2.7* 2.8*  --  2.5* SGOT 15 12*  --  13* ALT 22 21  --  20 No results for input(s): PH, PCO2, PO2, HCO3, FIO2 in the last 72 hours. 24 Hour Results: 
Recent Results (from the past 24 hour(s)) CBC W/O DIFF Collection Time: 06/16/19  5:07 AM  
Result Value Ref Range WBC 5.7 4.1 - 11.1 K/uL  
 RBC 3.83 (L) 4.10 - 5.70 M/uL  
 HGB 11.6 (L) 12.1 - 17.0 g/dL HCT 35.6 (L) 36.6 - 50.3 % MCV 93.0 80.0 - 99.0 FL  
 MCH 30.3 26.0 - 34.0 PG  
 MCHC 32.6 30.0 - 36.5 g/dL  
 RDW 13.6 11.5 - 14.5 % PLATELET 897 361 - 953 K/uL MPV 9.4 8.9 - 12.9 FL  
 NRBC 0.0 0  WBC ABSOLUTE NRBC 0.00 0.00 - 0.01 K/uL NT-PRO BNP Collection Time: 06/16/19  5:10 AM  
Result Value Ref Range NT pro-BNP 4,238 (H) <125 PG/ML  
MAGNESIUM Collection Time: 06/16/19  5:10 AM  
Result Value Ref Range Magnesium 2.1 1.6 - 2.4 mg/dL METABOLIC PANEL, COMPREHENSIVE Collection Time: 06/16/19  5:10 AM  
Result Value Ref Range Sodium 137 136 - 145 mmol/L Potassium 4.2 3.5 - 5.1 mmol/L Chloride 98 97 - 108 mmol/L  
 CO2 32 21 - 32 mmol/L Anion gap 7 5 - 15 mmol/L Glucose 92 65 - 100 mg/dL BUN 31 (H) 6 - 20 MG/DL Creatinine 1.93 (H) 0.70 - 1.30 MG/DL  
 BUN/Creatinine ratio 16 12 - 20 GFR est AA 42 (L) >60 ml/min/1.73m2 GFR est non-AA 35 (L) >60 ml/min/1.73m2 Calcium 9.0 8.5 - 10.1 MG/DL Bilirubin, total 0.4 0.2 - 1.0 MG/DL  
 ALT (SGPT) 22 12 - 78 U/L  
 AST (SGOT) 15 15 - 37 U/L Alk. phosphatase 90 45 - 117 U/L Protein, total 6.5 6.4 - 8.2 g/dL Albumin 2.7 (L) 3.5 - 5.0 g/dL Globulin 3.8 2.0 - 4.0 g/dL A-G Ratio 0.7 (L) 1.1 - 2.2 Problem List: 
Problem List as of 6/16/2019 Date Reviewed: 6/10/2019 Codes Class Noted - Resolved * (Principal) Acute on chronic systolic CHF (congestive heart failure) (HCC) ICD-10-CM: R78.90 ICD-9-CM: 428.23, 428.0  5/31/2019 - Present Paroxysmal atrial fibrillation (HCC) ICD-10-CM: I48.0 ICD-9-CM: 427.31  4/2/2019 - Present Medications reviewed Current Facility-Administered Medications Medication Dose Route Frequency  bumetanide (BUMEX) 0.25 mg/mL infusion  0.25 mg/hr IntraVENous CONTINUOUS  
 evolocumab (REPATHA SURECLICK) pen injection 599 mg (Patient Supplied)  140 mg SubCUTAneous Q 14 DAYS  spironolactone (ALDACTONE) tablet 25 mg  25 mg Oral DAILY  milrinone (PRIMACOR) 20 MG/100 ML D5W infusion  0.25 mcg/kg/min IntraVENous CONTINUOUS  
 melatonin tablet 3 mg  3 mg Oral QHS PRN  
 amiodarone (CORDARONE) tablet 200 mg  200 mg Oral BID  
 apixaban (ELIQUIS) tablet 5 mg  5 mg Oral Q12H  aspirin delayed-release tablet 81 mg  81 mg Oral DAILY  carvedilol (COREG) tablet 12.5 mg  12.5 mg Oral BID WITH MEALS  sodium chloride (NS) flush 5-40 mL  5-40 mL IntraVENous Q8H  
 sodium chloride (NS) flush 5-40 mL  5-40 mL IntraVENous PRN  
 ondansetron (ZOFRAN) injection 4 mg  4 mg IntraVENous Q4H PRN  
 bisacodyl (DULCOLAX) tablet 5 mg  5 mg Oral DAILY PRN  
 acetaminophen (TYLENOL) tablet 650 mg  650 mg Oral Q4H PRN  
 morphine injection 2 mg  2 mg IntraVENous Q4H PRN Care Plan discussed with: Patient/Family and Nurse Total time spent with patient: 30 minutes.  
 
Angela Wang MD

## 2019-06-16 NOTE — PROGRESS NOTES
[address] [facility] [facility] in [place], [place] Inpatient Progress Note Patient name: Fiona Lama Patient : 1950 Patient MRN: [id number] Attending MD: Toi Almonte MD 
Date of service: 19 CHIEF COMPLAINT: 
Acute on chronic systolic heart failure Recent Events: 
Excellent diuresis with current inotropic and diuretic regimen BP stable Patient reports persistent orthopnea 
  
PLAN: 
Continue inotrope assisted diuresis until euvolemic Once euvolemic and UTI confirmed treated would wean milrinone to off for baseline RHC Then proceed to CRT upgrade with/without inotropic bridge depending on baseline RHC 
EP plans noted to reattempt DCCV after amiodarone load around the same time Continue milrinone to 0.25 mcg/kg/min due to hypotension Continue bumex gtt 0.25mg per hour; goal net negative 2-3 liters Continue spironolactone 25mg daily Continue amiodarone for rate control; may need to reduce coreg dose as we diurese Intolerant to ACE/ARB/ARNi due to renal dysfunction and while aggressively diuresed Will retrieve most recent [facility] results Ambulate daily PT/OT 
  
IMPRESSION: 
Fatigue Shortness of breath Volume overload Pulmonary edema Acute on chronic systolic heart failure Stage C, NYHA class IIIA symptoms Coronary artery disease S/p arrest VFib and subsequent CABG  Sternal wound infection requiring sternectomy Ischemic cardiomyopathy, LVEF 20% 10/18 Atrial fibrillation s/p failed DCCV 6/10/19 Chronic anticoagulation with eliquis S/p ICD 3/21/11 LBBB,  ms Acute on CKD, stage 3 
UTI Urinary retension Anemia, iron deficiency H/o DVT Migraines JACQUE on 19 showed thrombus H/o tobacco abuse Nighttime desaturations, likely JOSE Body mass index is 27.99 kg/m². 
  
INTERVAL HISTORY: 
Afebrile -110s/60-90s HR 80s Atrial flutter Amiodarone 0.25 I/O net negative 4 liters Potassium 3.6 
  
CARDIAC IMAGING: 
 Echo (5/31/19) LVEF 21-25%, severely dilated LA, moderately dilated RA 
EKG (6/12/19) atrial flutter with occasional PVCs, LBBB QRS 158ms LHC not in epic 
  
HEMODYNAMICS: 
RHC not done CPEST not done 6MW not done 
  
OTHER IMAGING: 
CT chest (5/31/19) 1. No evidence of pulmonary embolus. 2. Interstitial edema and small bilateral pleural effusions. 3. Mild mediastinal adenopathy. 
  
INTERVAL HISTORY: 
Briefly, this is a 75 y/o pleasant white male with h/o HTN, HL, likey JOSE, CAD s/p cardiac arrest VFib s/p CABG (2011) c/b sternal would infection and sternotomy, ischemic cardiomyopathy LVEF 15-20%, s/p ICD and with LBBB. Patient admitted with acute on chronic systolic heart failure with massive volume overload > 20 lbs, in the setting of atrial fibrillation s/p failed DCCV and new UTI now treated with IV antibiotics. Abdomen: Soft, no mass or tenderness. No organomegaly or hernia. Bowel sounds present. Genitourinary and rectal: deferred Extremities: No cyanosis, clubbing, or edema. Neurologic: No focal sensory or motor deficits are noted. Grossly intact. Psychiatric: Awake, alert an doriented x 3. Appropriate mood and affect. Skin: Warm, dry and well perfused. No lesions, nodules or rashes are noted. REVIEW OF SYSTEMS: 
General: Denies fever, night sweats. Ear, nose and throat: Denies difficulty hearing, sinus problems, runny nose, post-nasal drip, ringing in ears, mouth sores, loose teeth, ear pain, nosebleeds, sore throate, facial pain or numbess Cardiovascular: see above in the interval history Respiratory: Denies cough, wheezing, sputum production, hemoptysis. Gastrointestinal: Denies heartburn, constipation, intolerance to certain foods, diarrhea, abdominal pain, nausea, vomiting, difficulty swallowing, blood in stool Kidney and bladder: Denies painful urination, frequent urination, urgency, prostate problems and impotence Musculoskeletal: Denies joint pain, muscle weakness Skin and hair: Denies change in existing skin lesions, hair loss or increase, breast changes PAST MEDICAL HISTORY: 
Past Medical History:  
Diagnosis Date  Degenerative disc disease, lumbar  Heart failure ([facility]  High cholesterol  Hypertension  Paroxysmal atrial fibrillation ([facility] 2019  Spinal stenosis PAST SURGICAL HISTORY: 
Past Surgical History:  
Procedure Laterality Date  HX APPENDECTOMY  HX CORONARY ARTERY BYPASS GRAFT    
 triple  HX HERNIA REPAIR    
 HX IMPLANTABLE CARDIOVERTER DEFIBRILLATOR  IA CARDIOVERSION ELECTIVE ARRHYTHMIA EXTERNAL N/A 6/10/2019 EP CARDIOVERSION performed by Jennifer Fisher MD at [place] [address], [facility] [address] CATH LAB FAMILY HISTORY: 
History reviewed. No pertinent family history. SOCIAL HISTORY: 
Social History Socioeconomic History  Marital status:  Spouse name: Not on file  Number of children: Not on file  Years of education: Not on file  Highest education level: Not on file Tobacco Use  Smoking status: Former Smoker Last attempt to quit: 2010 Years since quittin.5  Smokeless tobacco: Never Used Substance and Sexual Activity  Alcohol use: Yes Comment: rarely  Drug use: Never LABORATORY RESULTS: 
  
Labs Latest Ref Rng & Units 2019 6/15/2019 2019 2019 2019 2019 2019 WBC 4.1 - 11.1 K/uL 5.7 5.4 6.2 - - - 5.4  
RBC 4.10 - 5.70 M/uL 3.83(L) 3.36(L) 3.61(L) - - - 3.39(L) Hemoglobin 12.1 - 17.0 g/dL 11. 6(L) 10. 0(L) 11. 0(L) - - - 10. 2(L) Hematocrit 36.6 - 50.3 % 35. 6(L) 31. 3(L) 34. 3(L) - - - 32. 1(L) MCV 80.0 - 99.0 FL 93.0 93.2 95.0 - - - 94.7 Platelets 233 - 849 K/uL 267 322 293 - - - 207 Lymphocytes 12 - 49 % - - - - - - 14 Monocytes 5 - 13 % - - - - - - 10 Eosinophils 0 - 7 % - - - - - - 2 Basophils 0 - 1 % - - - - - - 1 Bands 0 - 6 % - - - - - - - Albumin 3.5 - 5.0 g/dL 2. 7(L) 2. 8(L) - 2. 5(L) - - 2. 6(L) Calcium 8.5 - 10.1 MG/DL 9.0 8.7 9.0 8.6 8. 1(L) 8.9 8.7 SGOT 15 - 37 U/L 15 12(L) - 13(L) - - 15 Glucose 65 - 100 mg/dL 92 114(H) 106(H) 94 102(H) 134(H) 102(H) BUN 6 - 20 MG/DL 31(H) 31(H) 30(H) 31(H) 30(H) 32(H) 39(H) Creatinine 0.70 - 1.30 MG/DL 1.93(H) 1.94(H) 1.76(H) 1.74(H) 1.74(H) 1.82(H) 1.89(H) Sodium 136 - 145 mmol/L 137 137 137 137 136 133(L) 136 Potassium 3.5 - 5.1 mmol/L 4.2 3.6 4.4 4.1 3.8 3.6 4.5 TSH 0.36 - 3.74 uIU/mL - - - - - - - Some recent data might be hidden Lab Results Component Value Date/Time TSH 2.45 06/01/2019 04:16 AM  
 
 
CURRENT MEDICATIONS: 
 
Current Facility-Administered Medications:  
  bumetanide (BUMEX) 0.25 mg/mL infusion, 0.25 mg/hr, IntraVENous, CONTINUOUS, Antwan Herrera NP, Last Rate: 1 mL/hr at 06/16/19 0501, 0.25 mg/hr at 06/16/19 0501 
  evolocumab (REPATHA SURECLICK) pen injection 004 mg (Patient Supplied), 140 mg, SubCUTAneous, Q 14 DAYS, Kaiser Castaneda MD, 140 mg at 06/14/19 1734   spironolactone (ALDACTONE) tablet 25 mg, 25 mg, Oral, DAILY, Kayley Lilly MD, 25 mg at 06/16/19 0480   milrinone (PRIMACOR) 20 MG/100 ML D5W infusion, 0.25 mcg/kg/min, IntraVENous, CONTINUOUS, Luis Miguel ACUNA NP, Last Rate: 7.5 mL/hr at 06/16/19 1107, 0.25 mcg/kg/min at 06/16/19 1107 
  melatonin tablet 3 mg, 3 mg, Oral, QHS PRN, Roland Sanchez MD, 3 mg at 06/09/19 2125 
  amiodarone (CORDARONE) tablet 200 mg, 200 mg, Oral, BID, Kayley Lilly MD, 200 mg at 06/16/19 2772   apixaban (ELIQUIS) tablet 5 mg, 5 mg, Oral, Q12H, Lizet Beal MD, 5 mg at 06/16/19 4218   aspirin delayed-release tablet 81 mg, 81 mg, Oral, DAILY, Lizet Beal MD, 81 mg at 06/16/19 1237   carvedilol (COREG) tablet 12.5 mg, 12.5 mg, Oral, BID WITH MEALS, Kayley Lilly MD, 12.5 mg at 06/16/19 5569   sodium chloride (NS) flush 5-40 mL, 5-40 mL, IntraVENous, Q8H, Lizet Beal MD, 10 mL at 06/16/19 6752   sodium chloride (NS) flush 5-40 mL, 5-40 mL, IntraVENous, PRN, Lizet Beal MD, 10 mL at 06/03/19 6411   ondansetron (ZOFRAN) injection 4 mg, 4 mg, IntraVENous, Q4H PRN, Lizet Beal MD 
  bisacodyl (DULCOLAX) tablet 5 mg, 5 mg, Oral, DAILY PRN, Lizet Beal MD 
  acetaminophen (TYLENOL) tablet 650 mg, 650 mg, Oral, Q4H PRN, Lizet Beal MD, 650 mg at 06/05/19 1642   morphine injection 2 mg, 2 mg, IntraVENous, Q4H PRN, Lizet Beal MD 
 
 
Thank you for allowing me to participate in this patient's care. Muriel Salomon AGACNP-[place] [address] [address] [place] [address], [address] Phone: [phone] Fax: [phone]

## 2019-06-16 NOTE — PROGRESS NOTES
Problem: Heart Failure: Day 3 Goal: Diagnostic Test/Procedures Outcome: Progressing Towards Goal 
Note:  
Last 3 Recorded Weights in this Encounter 06/14/19 3533 06/15/19 0356 06/16/19 0350 Weight: 99.6 kg (219 lb 9.3 oz) 97.4 kg (214 lb 11.2 oz) 96.5 kg (212 lb 12.8 oz) Pt on Bumex gtt. Continuing to diurese patient.

## 2019-06-16 NOTE — PROGRESS NOTES
Bedside shift change report given to [address] [place] [address] (oncoming nurse) by Shital Soto (offgoing nurse). Report included the following information SBAR, Procedure Summary, Intake/Output, Recent Results and Quality Measures.

## 2019-06-16 NOTE — PROGRESS NOTES
[facility] Cardiology Progress Note Date of service: 6/16/2019 Subjective: 
Diuresing well Breathing feels ok Legs less swollen He is on a bumex drip and milrinone Objective: 
 
Visit Vitals /65 (BP 1 Location: Right arm, BP Patient Position: At rest) Pulse 95 Temp 99.2 °F (37.3 °C) Resp 23 Wt 96.5 kg (212 lb 12.8 oz) SpO2 97% BMI 27.32 kg/m² Physical Exam  
Constitutional: He is oriented to person, place, and time. He appears well-developed and well-nourished. HENT:  
Head: Normocephalic and atraumatic. Eyes: Conjunctivae are normal. No scleral icterus. Neck: No JVD present. Cardiovascular: Normal rate and normal heart sounds. An irregular rhythm present. Exam reveals no gallop. No murmur heard. Pulmonary/Chest: Effort normal and breath sounds normal. No stridor. No respiratory distress. He has no wheezes. He has no rales. Abdominal: Soft. He exhibits no distension. Musculoskeletal: He exhibits edema. He exhibits no deformity. Neurological: He is alert and oriented to person, place, and time. Skin: Skin is warm and dry. Psychiatric: He has a normal mood and affect. His behavior is normal.  
 
 
Data reviewed: 
Recent Results (from the past 12 hour(s)) CBC W/O DIFF Collection Time: 06/16/19  5:07 AM  
Result Value Ref Range WBC 5.7 4.1 - 11.1 K/uL  
 RBC 3.83 (L) 4.10 - 5.70 M/uL  
 HGB 11.6 (L) 12.1 - 17.0 g/dL HCT 35.6 (L) 36.6 - 50.3 % MCV 93.0 80.0 - 99.0 FL  
 MCH 30.3 26.0 - 34.0 PG  
 MCHC 32.6 30.0 - 36.5 g/dL  
 RDW 13.6 11.5 - 14.5 % PLATELET 325 694 - 951 K/uL MPV 9.4 8.9 - 12.9 FL  
 NRBC 0.0 0  WBC ABSOLUTE NRBC 0.00 0.00 - 0.01 K/uL NT-PRO BNP Collection Time: 06/16/19  5:10 AM  
Result Value Ref Range NT pro-BNP 4,238 (H) <125 PG/ML  
MAGNESIUM Collection Time: 06/16/19  5:10 AM  
Result Value Ref Range Magnesium 2.1 1.6 - 2.4 mg/dL METABOLIC PANEL, COMPREHENSIVE  Collection Time: 06/16/19  5:10 AM  
 Result Value Ref Range Sodium 137 136 - 145 mmol/L Potassium 4.2 3.5 - 5.1 mmol/L Chloride 98 97 - 108 mmol/L  
 CO2 32 21 - 32 mmol/L Anion gap 7 5 - 15 mmol/L Glucose 92 65 - 100 mg/dL BUN 31 (H) 6 - 20 MG/DL Creatinine 1.93 (H) 0.70 - 1.30 MG/DL  
 BUN/Creatinine ratio 16 12 - 20 GFR est AA 42 (L) >60 ml/min/1.73m2 GFR est non-AA 35 (L) >60 ml/min/1.73m2 Calcium 9.0 8.5 - 10.1 MG/DL Bilirubin, total 0.4 0.2 - 1.0 MG/DL  
 ALT (SGPT) 22 12 - 78 U/L  
 AST (SGOT) 15 15 - 37 U/L Alk. phosphatase 90 45 - 117 U/L Protein, total 6.5 6.4 - 8.2 g/dL Albumin 2.7 (L) 3.5 - 5.0 g/dL Globulin 3.8 2.0 - 4.0 g/dL A-G Ratio 0.7 (L) 1.1 - 2.2 Assessment: 1. Acute on chronic systolic heart failure NYHA class IV Improving with inotrope and IV bumex 2. Persistent atrial fibrillation 3. CKD stage III 4. Ischemic Cardiomyopathy 5. CAD s/p CABG Plan: 
Continue milrinone and bumex drips Appreciate CHF team input Signed: 
Margeret Kawasaki L. Lauree Conch, MD 
Interventional Cardiology 6/16/2019

## 2019-06-17 LAB
ALBUMIN SERPL-MCNC: 2.7 G/DL (ref 3.5–5)
ALBUMIN/GLOB SERPL: 0.7 {RATIO} (ref 1.1–2.2)
ALP SERPL-CCNC: 89 U/L (ref 45–117)
ALT SERPL-CCNC: 21 U/L (ref 12–78)
ANION GAP SERPL CALC-SCNC: 6 MMOL/L (ref 5–15)
AST SERPL-CCNC: 14 U/L (ref 15–37)
ATRIAL RATE: 75 BPM
BILIRUB SERPL-MCNC: 0.5 MG/DL (ref 0.2–1)
BNP SERPL-MCNC: 3774 PG/ML
BUN SERPL-MCNC: 32 MG/DL (ref 6–20)
BUN/CREAT SERPL: 15 (ref 12–20)
CALCIUM SERPL-MCNC: 9.1 MG/DL (ref 8.5–10.1)
CALCULATED R AXIS, ECG10: -12 DEGREES
CALCULATED T AXIS, ECG11: 110 DEGREES
CHLORIDE SERPL-SCNC: 97 MMOL/L (ref 97–108)
CO2 SERPL-SCNC: 33 MMOL/L (ref 21–32)
CREAT SERPL-MCNC: 2.13 MG/DL (ref 0.7–1.3)
DIAGNOSIS, 93000: NORMAL
ERYTHROCYTE [DISTWIDTH] IN BLOOD BY AUTOMATED COUNT: 13.4 % (ref 11.5–14.5)
GLOBULIN SER CALC-MCNC: 4 G/DL (ref 2–4)
GLUCOSE SERPL-MCNC: 95 MG/DL (ref 65–100)
HCT VFR BLD AUTO: 33.2 % (ref 36.6–50.3)
HGB BLD-MCNC: 10.8 G/DL (ref 12.1–17)
MAGNESIUM SERPL-MCNC: 2.1 MG/DL (ref 1.6–2.4)
MCH RBC QN AUTO: 30.1 PG (ref 26–34)
MCHC RBC AUTO-ENTMCNC: 32.5 G/DL (ref 30–36.5)
MCV RBC AUTO: 92.5 FL (ref 80–99)
NRBC # BLD: 0 K/UL (ref 0–0.01)
NRBC BLD-RTO: 0 PER 100 WBC
PLATELET # BLD AUTO: 296 K/UL (ref 150–400)
PMV BLD AUTO: 9.1 FL (ref 8.9–12.9)
POTASSIUM SERPL-SCNC: 3.8 MMOL/L (ref 3.5–5.1)
PROT SERPL-MCNC: 6.7 G/DL (ref 6.4–8.2)
Q-T INTERVAL, ECG07: 468 MS
QRS DURATION, ECG06: 158 MS
QTC CALCULATION (BEZET), ECG08: 533 MS
RBC # BLD AUTO: 3.59 M/UL (ref 4.1–5.7)
SODIUM SERPL-SCNC: 136 MMOL/L (ref 136–145)
VENTRICULAR RATE, ECG03: 78 BPM
WBC # BLD AUTO: 5.8 K/UL (ref 4.1–11.1)

## 2019-06-17 PROCEDURE — 83735 ASSAY OF MAGNESIUM: CPT

## 2019-06-17 PROCEDURE — 74011000250 HC RX REV CODE- 250: Performed by: NURSE PRACTITIONER

## 2019-06-17 PROCEDURE — 93005 ELECTROCARDIOGRAM TRACING: CPT

## 2019-06-17 PROCEDURE — 74011250637 HC RX REV CODE- 250/637: Performed by: INTERNAL MEDICINE

## 2019-06-17 PROCEDURE — 80053 COMPREHEN METABOLIC PANEL: CPT

## 2019-06-17 PROCEDURE — 77010033678 HC OXYGEN DAILY

## 2019-06-17 PROCEDURE — 65660000001 HC RM ICU INTERMED STEPDOWN

## 2019-06-17 PROCEDURE — 36415 COLL VENOUS BLD VENIPUNCTURE: CPT

## 2019-06-17 PROCEDURE — 97116 GAIT TRAINING THERAPY: CPT

## 2019-06-17 PROCEDURE — 85027 COMPLETE CBC AUTOMATED: CPT

## 2019-06-17 PROCEDURE — 83880 ASSAY OF NATRIURETIC PEPTIDE: CPT

## 2019-06-17 PROCEDURE — 74011250636 HC RX REV CODE- 250/636: Performed by: NURSE PRACTITIONER

## 2019-06-17 PROCEDURE — 99233 SBSQ HOSP IP/OBS HIGH 50: CPT | Performed by: INTERNAL MEDICINE

## 2019-06-17 RX ORDER — BUMETANIDE 0.25 MG/ML
2 INJECTION INTRAMUSCULAR; INTRAVENOUS 2 TIMES DAILY
Status: DISCONTINUED | OUTPATIENT
Start: 2019-06-17 | End: 2019-06-18

## 2019-06-17 RX ADMIN — AMIODARONE HYDROCHLORIDE 200 MG: 200 TABLET ORAL at 18:00

## 2019-06-17 RX ADMIN — Medication 10 ML: at 05:25

## 2019-06-17 RX ADMIN — BUMETANIDE 2 MG: 0.25 INJECTION INTRAMUSCULAR; INTRAVENOUS at 18:58

## 2019-06-17 RX ADMIN — APIXABAN 5 MG: 5 TABLET, FILM COATED ORAL at 21:43

## 2019-06-17 RX ADMIN — CARVEDILOL 12.5 MG: 12.5 TABLET, FILM COATED ORAL at 16:25

## 2019-06-17 RX ADMIN — CARVEDILOL 12.5 MG: 12.5 TABLET, FILM COATED ORAL at 08:36

## 2019-06-17 RX ADMIN — Medication 10 ML: at 13:37

## 2019-06-17 RX ADMIN — MILRINONE LACTATE IN DEXTROSE 0.25 MCG/KG/MIN: 200 INJECTION, SOLUTION INTRAVENOUS at 12:16

## 2019-06-17 RX ADMIN — SPIRONOLACTONE 25 MG: 25 TABLET ORAL at 08:36

## 2019-06-17 RX ADMIN — Medication 10 ML: at 21:43

## 2019-06-17 RX ADMIN — APIXABAN 5 MG: 5 TABLET, FILM COATED ORAL at 08:36

## 2019-06-17 RX ADMIN — ASPIRIN 81 MG: 81 TABLET ORAL at 08:36

## 2019-06-17 RX ADMIN — AMIODARONE HYDROCHLORIDE 200 MG: 200 TABLET ORAL at 08:36

## 2019-06-17 RX ADMIN — MILRINONE LACTATE IN DEXTROSE 0.25 MCG/KG/MIN: 200 INJECTION, SOLUTION INTRAVENOUS at 05:25

## 2019-06-17 RX ADMIN — BUMETANIDE 0.25 MG/HR: 0.25 INJECTION INTRAMUSCULAR; INTRAVENOUS at 05:25

## 2019-06-17 NOTE — PROGRESS NOTES
Bedside shift change report given to [address]on (oncoming nurse) by Olivia Perdoom (offgoing nurse). Report included the following information SBAR, Procedure Summary, Recent Results and Quality Measures.

## 2019-06-17 NOTE — PROGRESS NOTES
Hospitalist Progress Note Michele Cabello MD 
Please call  and page for questions. Call physician on-call through the  7pm-7am 
 
Daily Progress Note: 6/17/2019 Primary care Derick Marrero MD 
 
Date of admission: 5/31/2019  7:03 PM 
 
Admission summery and hospital course: 
79-year-old man with past medical history significant for chronic systolic congestive heart failure, dyslipidemia, hypertension, atrial fibrillation status post cardioversion, was in his usual state of health until the day of presentation at the emergency room when the patient developed worsening of his shortness of breath.  The inpatient underwent cardioversion early this morning by his cardiologist. 
Jens Gloriaters started on 06/05. 
  
Subjective:  
 
6/17/2019 Pt growing impatient w extent of stay,   
Card and AHF team following Cr up a bit today, Pt cont on both milrinone and Bumex drips this am   Likely needs Bumex drip dc/d and allow pt to find the baseline bp/hf tolerance off milronone,   These have to be be addressed by the medical subspecialtist writing these meds. That said, pt likely will return back to the status that brought him into hospital in a few weeks. Anival De Luna MD 6/17/2019 Assessment/Plan:  
Acute-on-chronic systolic congestive heart failure NYHA 2-3  
JACQUE on 05/31 with EF of 21%-25%.      
Entresto hold for low BP for increased creatinine. Continue diuresis, amiodarone, Bumex, coreg, IV dobutamin and O2 as needed. CTA of the chest is negative for pulmonary embolism.  Appreciated Dr Nielson/Dr Olvera input. Aldactone on hold now.   
Patient needs 6 minutes walk prior to discharge.  
Will continue to follow cardiology recommendations. conton milronone qtts adjusted by AHF team  Cont on pressors/iv bumex, renal numbers bump but stable x 2 at 1.9 /following Aflutter/afibe, had CC to NSR a few days ago 6/5 +/- n6/13 in aflutter; 6/14/2019 afib Back on afib over weekend though today ,    
  
Atrial fibrillation, status post cardioversion.   
Pt still in Afib. Will undergo cardioversion 6/14; as per cardiology. Meanwhile continue amiodarone, coreg. On Eliquis for anticoagulation. On pressors ; temporarily in NSR but  afib continues 6/17/2019  
  
Hypertension. Hold On entresto. BP is reasonable now.  
BP Readings from Last 1 Encounters:  
06/17/19 107/63  
  
  
Acute kidney injury.   
Creatinine is improving. Most likely as a result of the diuretic therapy and or entresto.  Renal US on 06/05 was normal.  
Lab Results Component Value Date/Time Creatinine 2.13 (H) 06/17/2019 03:45 AM  
 lab for 6/18 planned. UTI: Tmax as 100 on 06/06 PM. Afebrile since than. Continue to monitor with Rocephin.  
Ucx c/w Enterobacter Cloacae Treated neal out 6/13 DANIELS< insetting of BPH, neal in several days and out as of 7/13. And cont out Insomnia: Will increase the melatonin. Had good night sleep last pm 6/12/2019  
  
See orders for other plans. VTE prophylaxis: On Eliquis.  
Code status: Full Discussed plan of care with Patient/Family and Nurse. Patient wife is at the bedside.  
Pre-admission lived at home. Discharge planning: pending Review of Systems:  
 
Review of Systems: 
Symptom  Y/N  Comments   Symptom  Y/N  Comments Fever/Chills   n   Chest Pain  n   
Poor Appetite  n    Edema  n    
Cough  n   Abdominal Pain  n    
Sputum  n   Joint Pain SOB/DOUGLAS  y Improved   Pruritis/Rash Nausea/vomit  n   Tolerating PT/OT Diarrhea     Tolerating Diet  y Constipation     Other Could not obtain due to:    
 
 
Objective:  
Physical Exam:  
 
Visit Vitals /63 (BP 1 Location: Right arm, BP Patient Position: Sitting) Pulse 72 Temp 98.4 °F (36.9 °C) Resp 15 Wt 96.1 kg (211 lb 12.8 oz) SpO2 100% BMI 27.19 kg/m² O2 Flow Rate (L/min): 2 l/min O2 Device: Nasal cannula Temp (24hrs), Av.3 °F (36.8 °C), Min:98 °F (36.7 °C), Max:98.4 °F (36.9 °C) No intake/output data recorded. 06/15 1901 -  0700 In: 371.8 [P.O.:300; I.V.:71.8] Out: 1539 [[id number]] Stable exam.2019 General:  Alert, cooperative, no distress, appears stated age. Lungs:     clear eldon bases Heart:  Regular rate and rhythm, S1, S2 normal, no murmur.   
Abdomen:   Soft, non-tender. Bowel sounds normal.   
Extremities: Extremities normal, atraumatic, no cyanosis. Edema at LEs. Skin: Skin color, texture, turgor normal. No rashes or lesions Neurologic: Patient was SOB while he walked slowly at the hallway. Patient interacts well. Patient voice is clear.   
  
Data Review:  
   
Recent Days: 
Recent Labs  
  19 
0345 19 
0507 06/15/19 
0431 WBC 5.8 5.7 5.4 HGB 10.8* 11.6* 10.0* HCT 33.2* 35.6* 31.3*  
 267 322 Recent Labs  
  19 
0345 19 
0510 06/15/19 
0440  137 137  
K 3.8 4.2 3.6 CL 97 98 99 CO2 33* 32 31 GLU 95 92 114* BUN 32* 31* 31* CREA 2.13* 1.93* 1.94* CA 9.1 9.0 8.7 MG 2.1 2.1 2.1 ALB 2.7* 2.7* 2.8* SGOT 14* 15 12* ALT 21 22 21 No results for input(s): PH, PCO2, PO2, HCO3, FIO2 in the last 72 hours. 24 Hour Results: 
Recent Results (from the past 24 hour(s)) NT-PRO BNP Collection Time: 19  3:45 AM  
Result Value Ref Range NT pro-BNP 3,774 (H) <125 PG/ML  
MAGNESIUM Collection Time: 19  3:45 AM  
Result Value Ref Range Magnesium 2.1 1.6 - 2.4 mg/dL METABOLIC PANEL, COMPREHENSIVE Collection Time: 19  3:45 AM  
Result Value Ref Range Sodium 136 136 - 145 mmol/L Potassium 3.8 3.5 - 5.1 mmol/L Chloride 97 97 - 108 mmol/L  
 CO2 33 (H) 21 - 32 mmol/L Anion gap 6 5 - 15 mmol/L Glucose 95 65 - 100 mg/dL BUN 32 (H) 6 - 20 MG/DL Creatinine 2.13 (H) 0.70 - 1.30 MG/DL  
 BUN/Creatinine ratio 15 12 - 20 GFR est AA 38 (L) >60 ml/min/1.73m2 GFR est non-AA 31 (L) >60 ml/min/1.73m2 Calcium 9.1 8.5 - 10.1 MG/DL Bilirubin, total 0.5 0.2 - 1.0 MG/DL  
 ALT (SGPT) 21 12 - 78 U/L  
 AST (SGOT) 14 (L) 15 - 37 U/L Alk. phosphatase 89 45 - 117 U/L Protein, total 6.7 6.4 - 8.2 g/dL Albumin 2.7 (L) 3.5 - 5.0 g/dL Globulin 4.0 2.0 - 4.0 g/dL A-G Ratio 0.7 (L) 1.1 - 2.2    
CBC W/O DIFF Collection Time: 06/17/19  3:45 AM  
Result Value Ref Range WBC 5.8 4.1 - 11.1 K/uL  
 RBC 3.59 (L) 4.10 - 5.70 M/uL  
 HGB 10.8 (L) 12.1 - 17.0 g/dL HCT 33.2 (L) 36.6 - 50.3 % MCV 92.5 80.0 - 99.0 FL  
 MCH 30.1 26.0 - 34.0 PG  
 MCHC 32.5 30.0 - 36.5 g/dL  
 RDW 13.4 11.5 - 14.5 % PLATELET 280 975 - 834 K/uL MPV 9.1 8.9 - 12.9 FL  
 NRBC 0.0 0  WBC ABSOLUTE NRBC 0.00 0.00 - 0.01 K/uL EKG, 12 LEAD, INITIAL Collection Time: 06/17/19  5:07 AM  
Result Value Ref Range Ventricular Rate 78 BPM  
 Atrial Rate 75 BPM  
 QRS Duration 158 ms Q-T Interval 468 ms QTC Calculation (Bezet) 533 ms Calculated R Axis -12 degrees Calculated T Axis 110 degrees Diagnosis Atrial fibrillation Left bundle branch block When compared with ECG of 16-JUN-2019 06:04, No significant change was found Problem List: 
Problem List as of 6/17/2019 Date Reviewed: 6/10/2019 Codes Class Noted - Resolved * (Principal) Acute on chronic systolic CHF (congestive heart failure) (HCC) ICD-10-CM: V12.61 ICD-9-CM: 428.23, 428.0  5/31/2019 - Present Paroxysmal atrial fibrillation (HCC) ICD-10-CM: I48.0 ICD-9-CM: 427.31  4/2/2019 - Present Medications reviewed Current Facility-Administered Medications Medication Dose Route Frequency  bumetanide (BUMEX) 0.25 mg/mL infusion  0.25 mg/hr IntraVENous CONTINUOUS  
 evolocumab (REPATHA SURECLICK) pen injection 163 mg (Patient Supplied)  140 mg SubCUTAneous Q 14 DAYS  spironolactone (ALDACTONE) tablet 25 mg  25 mg Oral DAILY  milrinone (PRIMACOR) 20 MG/100 ML D5W infusion  0.25 mcg/kg/min IntraVENous CONTINUOUS  
 melatonin tablet 3 mg  3 mg Oral QHS PRN  
 amiodarone (CORDARONE) tablet 200 mg  200 mg Oral BID  
 apixaban (ELIQUIS) tablet 5 mg  5 mg Oral Q12H  aspirin delayed-release tablet 81 mg  81 mg Oral DAILY  carvedilol (COREG) tablet 12.5 mg  12.5 mg Oral BID WITH MEALS  sodium chloride (NS) flush 5-40 mL  5-40 mL IntraVENous Q8H  
 sodium chloride (NS) flush 5-40 mL  5-40 mL IntraVENous PRN  
 ondansetron (ZOFRAN) injection 4 mg  4 mg IntraVENous Q4H PRN  
 bisacodyl (DULCOLAX) tablet 5 mg  5 mg Oral DAILY PRN  
 acetaminophen (TYLENOL) tablet 650 mg  650 mg Oral Q4H PRN  
 morphine injection 2 mg  2 mg IntraVENous Q4H PRN Care Plan discussed with: Patient/Family and Nurse Total time spent with patient: 30 minutes.  
 
Blake Carrasco MD

## 2019-06-17 NOTE — PROGRESS NOTES
[facility] Cardiology Progress Note                                        KNFAS Date: 5/31/2019 
  
  
Appreciate input from AHF and EP re patient. Pt reports he is less short of breath with exertion. 8# weight loss on bumex drip but Cr up this am.  
 
Assessment/Plan: # Acute on chronic systolic HF -EF 10% 
- improved initially  with diuretics, but creat increased and BP dropped; changed dobutamine to milrinone monday as BP had increased;  improved diuresis with IV bumex but will need to back off due to rising Cr. -RHC can be done tomorrow if that is agreeable # AF -reverted to afib, cont eliquis and amiodarone; in aflutter, CV last monday with recurrent flutter; may re-cardiovert after another week of increased amiodarone dose 
- Consider ablation in future once he has been optimized.  
  
# LBBB - he has progressed recently with LBBB. Will plan upgrade to BiV ICD/pacer  
  
# Acute on chronic kidney injury-renal US normal; stabilized, though higher than his baseline; also has some retention and flomax caused hypotension; UO good 
  
# fever- urine culture positive with gram-rods; on rocephin-last day wednesday; repeat culture negative 
  #urinary retention-urology suggests home with neal; has some hematuria which appears mild; neal removed; urinating fine 
  
# Anemia-iron deficient; venofer suggested Exam: 
Blood pressure 127/85, pulse 76, temperature 98.2 °F (36.8 °C), resp. rate 16, weight 96.1 kg (211 lb 12.8 oz), SpO2 96 %. Lungs clear Cor irreg with S3 Ext with decreased edema Labs: 
Cr 2.13 
K 3.8 Man Bernstein MD

## 2019-06-17 NOTE — PROGRESS NOTES
Problem: Mobility Impaired (Adult and Pediatric) Goal: *Acute Goals and Plan of Care (Insert Text) Description Physical Therapy Goals Revised 6/14/19 1. Patient will ambulate with modified independence for 400 feet with the least restrictive device within 7 day(s). Initiated 6/7/2019 1. Patient will ambulate with modified independence for 400 feet with the least restrictive device within 7 day(s). Outcome: Progressing Towards Goal 
PHYSICAL THERAPY TREATMENT Patient: Verona Cyr (77 y.o. male) Date: 6/17/2019 Diagnosis: Acute on chronic systolic CHF (congestive heart failure) ([facility]) [I50.23] Acute on chronic systolic CHF (congestive heart failure) ([facility] [address] Procedure(s) (LRB): 
EP CARDIOVERSION (N/A) 7 Days Post-Op Precautions:   
Chart, physical therapy assessment, plan of care and goals were reviewed. ASSESSMENT: 
Based on the objective data described below, the patient presents with decreased standing balance and limited functional endurance compared to baseline yet improving. Demonstrated sit<>stand w/ SBA from chair and gait training completed using RW + SBA. Pt tolerated amb well on RA, minimal SOB noted, no rest break needed. SpO2 above 90's pre and post-activity. Pt demonstrated mild path deviations (B) w/ use of RW d/t continued reliability on UE for support. The following are barriers to independence while in acute care:  
-Cognitive and/or behavioral: none  
-Medical condition: strength, functional endurance, standing balance, cardiopulmonary tolerance and medical history   
-Other:    
 
Prior level of function: Independent (walking stick prn) PLAN: 
Patient continues to benefit from skilled intervention to address the above impairments. Continue treatment per established plan of care. Recommend with staff: Pt continue to be OOB/chair 3x/day w/ NSG assist. Pt to amb in dias using RW w/ family and/or NSG assist/supervision. Recommend next PT session: Gait training (w/ SPC as appropriate); 6MWT. Balance and therapeutic exercise. Discharge recommendations: Home health (to increase independence and safety) If above is not an option then recommend: To be determined between the prior selections, based on patient progress during hospital stay Patient's barriers to discharging home, in addition to above impairments: family availability to assist 
entry and exit into the home 
family availability for education/training to then follow up at home 
level of physical assist required to maintain patient safety. Equipment recommendations for successful discharge (if) home: Danny Valerio  (pt owns) SUBJECTIVE:  
Patient stated ? It needs a front end alignment. ? referring to RW deviating during gait OBJECTIVE DATA SUMMARY:  
Critical Behavior: 
Neurologic State: Alert Orientation Level: Oriented X4 Cognition: Appropriate decision making, Appropriate for age attention/concentration, Appropriate safety awareness, Follows commands Functional Mobility Training: 
Bed Mobility: 
  
Supine to Sit: (received OOB in chair) Sit to Supine: (returned to chair at bedside) Transfers: 
Sit to Stand: Stand-by assistance Stand to Sit: Stand-by assistance Balance: 
Sitting: Intact Standing: Intact; With support Standing - Static: Constant support;Good(RW) 
Standing - Dynamic : Constant support;Good Ambulation/Gait Training: 
Distance (ft): 300 Feet (ft) Assistive Device: Gait belt;Walker, rolling Ambulation - Level of Assistance: Stand-by assistance Gait Abnormalities: Decreased step clearance; Path deviations Therapeutic Exercises:  
Reviewed seated LAQ's, DF/PF, Seated hip flexion, as well as standing heel raises, standing hip flexion, hip ABD/ADD (w/ use of RW and NSG/family assist/supervision). Pain: No reports of pain. Activity Tolerance: WNL and SpO2 stable on RA Please refer to the flowsheet for vital signs taken during this treatment. After treatment patient left:  
Up in chair Call light within reach RN notified COMMUNICATION/COLLABORATION:  
The patient?s plan of care was discussed with: Registered Nurse Gina Phelps Time Calculation: 16 mins

## 2019-06-17 NOTE — PROGRESS NOTES
1930: Bedside shift change report given to Aamir Wisdom RN (oncoming nurse) by Jose Manuel Garza RN (offgoing nurse). Report included the following information SBAR, Kardex, Intake/Output, MAR, Recent Results and Cardiac Rhythm Afib/flutter. Problem: Heart Failure: Day 5 Goal: Activity/Safety Outcome: Progressing Towards Goal 
Note: Pt working with PT today. Goal: Medications Outcome: Progressing Towards Goal 
Note: On milrinone gtt. Transitioned off of bumex drip and receiving IV bumex. Receiving amiodarone, coreg, and spironolactone. Goal: Respiratory Outcome: Progressing Towards Goal 
Note: On room air. Wearing 2L as needed for comfort. Maintaining SpO2 >90%.

## 2019-06-17 NOTE — PROGRESS NOTES
EP/Arrhythmia consult seen, full note to follow. All questions answered for the patient and wife. Discussed with Dr. Tia Montana.

## 2019-06-17 NOTE — PROGRESS NOTES
[facility] [place] [place] In[place] Progress Note Patient name: Anabel Herrera Patient : 1950 Patient MRN: [id number] Attending MD: Kari Jain MD 
Date of service: 19 CHIEF COMPLAINT: 
Acute on chronic systolic heart failure Recent Events: 
Excellent diuresis with current inotropic and diuretic regimen 
  PLAN: 
Continue inotrope assisted diuresis until euvolemic Once euvolemic and UTI confirmed treated would wean milrinone to off for baseline RHC Then proceed to CRT upgrade with/without inotropic bridge depending on baseline RHC 
EP plans noted to reattempt DCCV after amiodarone load around the same time Decrease milrinone to 0.125 mcg/kg/min, then d/c at 0400 in preparation for RHC tomorrow AM  
Transition to Bumex 2 mg IV BID Continue spironolactone 25mg daily Continue amiodarone for rate control; may need to reduce coreg dose as we diurese Intolerant to ACE/ARB/ARNi due to renal dysfunction and while aggressively diuresed Ambulate daily PT/OT 
  
IMPRESSION: 
Fatigue Shortness of breath Volume overload Pulmonary edema Acute on chronic systolic heart failure Stage C, NYHA class IIIA symptoms Coronary artery disease S/p arrest VFib and subsequent CABG  Sternal wound infection requiring sternectomy Ischemic cardiomyopathy, LVEF 20% 10/18 Atrial fibrillation s/p failed DCCV 6/10/19 Chronic anticoagulation with eliquis S/p ICD 3/21/11 LBBB,  ms Acute on CKD, stage 3 
UTI Urinary retension Anemia, iron deficiency H/o DVT Migraines JACQUE on 19 showed thrombus H/o tobacco abuse Nighttime desaturations, likely JOSE Body mass index is 27.99 kg/m². Patient seen and examined. Data and note reviewed. I have discussed and agree with the plans as noted.  76 year  Old male with a history of ICM, persistent AF, LBBB who has had recurrent admissions for acute on chronic systolic heart failure. Plan RHC with possible DCCV tomorrow. Agree with BiV upgrade and possible AF ablation once his systolic heart failure has been stabilized. Thank you for allowing us to participate in your patient's care. Mounika Joaquin MD, Jerman Lantigua Chief of Cardiology, [facility] Medical Director [facility] [address] [place] [address], [address] Office [phone] Fax [phone] 
 
 
 
  
INTERVAL HISTORY: 
Afebrile -110s/60-90s HR 80s Atrial flutter Amiodarone 0.25 I/O net negative 4 liters Potassium 3.6 
  
CARDIAC IMAGING: 
Echo (5/31/19) LVEF 21-25%, severely dilated LA, moderately dilated RA 
EKG (6/12/19) atrial flutter with occasional PVCs, LBBB QRS 158ms LHC not in epic 
  
HEMODYNAMICS: 
RHC not done CPEST not done 6MW not done 
  
OTHER IMAGING: 
CT chest (5/31/19) 1. No evidence of pulmonary embolus. 2. Interstitial edema and small bilateral pleural effusions. 3. Mild mediastinal adenopathy. 
  
INTERVAL HISTORY: 
Briefly, this is a 75 y/o pleasant white male with h/o HTN, HL, likey JOSE, CAD s/p cardiac arrest VFib s/p CABG (2011) c/b sternal would infection and sternotomy, ischemic cardiomyopathy LVEF 15-20%, s/p ICD and with LBBB. Patient admitted with acute on chronic systolic heart failure with massive volume overload > 20 lbs, in the setting of atrial fibrillation s/p failed DCCV and new UTI now treated with IV antibiotics. Abdomen: Soft, no mass or tenderness. No organomegaly or hernia. Bowel sounds present. Genitourinary and rectal: deferred Extremities: No cyanosis, clubbing, or edema. Neurologic: No focal sensory or motor deficits are noted. Grossly intact. Psychiatric: Awake, alert an doriented x 3. Appropriate mood and affect. Skin: Warm, dry and well perfused. No lesions, nodules or rashes are noted. REVIEW OF SYSTEMS: 
General: Denies fever, night sweats. Ear, nose and throat: Denies difficulty hearing, sinus problems, runny nose, post-nasal drip, ringing in ears, mouth sores, loose teeth, ear pain, nosebleeds, sore throate, facial pain or numbess Cardiovascular: see above in the interval history Respiratory: Denies cough, wheezing, sputum production, hemoptysis. Gastrointestinal: Denies heartburn, constipation, intolerance to certain foods, diarrhea, abdominal pain, nausea, vomiting, difficulty swallowing, blood in stool Kidney and bladder: Denies painful urination, frequent urination, urgency, prostate problems and impotence Musculoskeletal: Denies joint pain, muscle weakness Skin and hair: Denies change in existing skin lesions, hair loss or increase, breast changes PAST MEDICAL HISTORY: 
Past Medical History:  
Diagnosis Date  Degenerative disc disease, lumbar  Heart failure ([facility] [address]  High cholesterol  Hypertension  Paroxysmal atrial fibrillation ([facility] [address] 2019  Spinal stenosis PAST SURGICAL HISTORY: 
Past Surgical History:  
Procedure Laterality Date  HX APPENDECTOMY  HX CORONARY ARTERY BYPASS GRAFT    
 triple  HX HERNIA REPAIR    
 HX IMPLANTABLE CARDIOVERTER DEFIBRILLATOR  CA CARDIOVERSION ELECTIVE ARRHYTHMIA EXTERNAL N/A 6/10/2019 EP CARDIOVERSION performed by Aury David MD at [facility]/s Dr CATH LAB FAMILY HISTORY: 
History reviewed. No pertinent family history. SOCIAL HISTORY: 
Social History Socioeconomic History  Marital status:  Spouse name: Not on file  Number of children: Not on file  Years of education: Not on file  Highest education level: Not on file Tobacco Use  Smoking status: Former Smoker Last attempt to quit: 2010 Years since quittin.5  Smokeless tobacco: Never Used Substance and Sexual Activity  Alcohol use: Yes Comment: rarely  Drug use: Never LABORATORY RESULTS: 
  
 Labs Latest Ref Rng & Units 6/17/2019 6/16/2019 6/15/2019 6/14/2019 6/14/2019 6/13/2019 6/12/2019 WBC 4.1 - 11.1 K/uL 5.8 5.7 5.4 6.2 - - -  
RBC 4.10 - 5.70 M/uL 3.59(L) 3.83(L) 3.36(L) 3.61(L) - - - Hemoglobin 12.1 - 17.0 g/dL 10. 8(L) 11. 6(L) 10. 0(L) 11. 0(L) - - - Hematocrit 36.6 - 50.3 % 33. 2(L) 35. 6(L) 31. 3(L) 34. 3(L) - - - MCV 80.0 - 99.0 FL 92.5 93.0 93.2 95.0 - - - Platelets 368 - 753 K/uL 296 267 322 293 - - - Lymphocytes 12 - 49 % - - - - - - - Monocytes 5 - 13 % - - - - - - - Eosinophils 0 - 7 % - - - - - - - Basophils 0 - 1 % - - - - - - - Bands 0 - 6 % - - - - - - - Albumin 3.5 - 5.0 g/dL 2. 7(L) 2. 7(L) 2. 8(L) - 2. 5(L) - -  
Calcium 8.5 - 10.1 MG/DL 9.1 9.0 8.7 9.0 8.6 8. 1(L) 8.9 SGOT 15 - 37 U/L 14(L) 15 12(L) - 13(L) - -  
Glucose 65 - 100 mg/dL 95 92 114(H) 106(H) 94 102(H) 134(H) BUN 6 - 20 MG/DL 32(H) 31(H) 31(H) 30(H) 31(H) 30(H) 32(H) Creatinine 0.70 - 1.30 MG/DL 2.13(H) 1.93(H) 1.94(H) 1.76(H) 1.74(H) 1.74(H) 1.82(H) Sodium 136 - 145 mmol/L 136 137 137 137 137 136 133(L) Potassium 3.5 - 5.1 mmol/L 3.8 4.2 3.6 4.4 4.1 3.8 3.6 TSH 0.36 - 3.74 uIU/mL - - - - - - - Some recent data might be hidden Lab Results Component Value Date/Time TSH 2.45 06/01/2019 04:16 AM  
 
 
CURRENT MEDICATIONS: 
 
Current Facility-Administered Medications:  
  bumetanide (BUMEX) injection 2 mg, 2 mg, IntraVENous, BID, Anton Herrera NP 
  evolocumab (REPATHA SURECLICK) pen injection 620 mg (Patient Supplied), 140 mg, SubCUTAneous, Q 14 DAYS, Von Rodriguez MD, 140 mg at 06/14/19 1735   spironolactone (ALDACTONE) tablet 25 mg, 25 mg, Oral, DAILY, Umang Tierney MD, 25 mg at 06/17/19 0836 
  milrinone (PRIMACOR) 20 MG/100 ML D5W infusion, 0.125 mcg/kg/min, IntraVENous, CONTINUOUS, Katrina Herrera NP, Last Rate: 3.8 mL/hr at 06/17/19 1626, 0.125 mcg/kg/min at 06/17/19 1626 
  melatonin tablet 3 mg, 3 mg, Oral, QHS PRN, Norberto Sanchez MD, 3 mg at 06/09/19 2126   amiodarone (CORDARONE) tablet 200 mg, 200 mg, Oral, BID, Aura Stone MD, 200 mg at 06/17/19 5464   apixaban (ELIQUIS) tablet 5 mg, 5 mg, Oral, Q12H, Lizet Beal MD, 5 mg at 06/17/19 0836 
  aspirin delayed-release tablet 81 mg, 81 mg, Oral, DAILY, Lizet Beal MD, 81 mg at 06/17/19 1423   carvedilol (COREG) tablet 12.5 mg, 12.5 mg, Oral, BID WITH MEALS, Aura Stone MD, 12.5 mg at 06/17/19 1625   sodium chloride (NS) flush 5-40 mL, 5-40 mL, IntraVENous, Q8H, Lizet Beal MD, 10 mL at 06/17/19 1337 
  sodium chloride (NS) flush 5-40 mL, 5-40 mL, IntraVENous, PRN, Lizet Beal MD, 10 mL at 06/03/19 4773   ondansetron (ZOFRAN) injection 4 mg, 4 mg, IntraVENous, Q4H PRN, Lizet Beal MD 
  bisacodyl (DULCOLAX) tablet 5 mg, 5 mg, Oral, DAILY PRN, Lizet Beal MD 
  acetaminophen (TYLENOL) tablet 650 mg, 650 mg, Oral, Q4H PRN, Lizet Beal MD, 650 mg at 06/05/19 1642   morphine injection 2 mg, 2 mg, IntraVENous, Q4H PRN, Lizet Beal MD 
 
 
Thank you for allowing me to participate in this patient's care. John Lopez AGACNP-BC [facility] [id number] [facility] [place] 
[address] [place] [address], [address] Phone: [phone] Fax: [phone]

## 2019-06-17 NOTE — PROGRESS NOTES
Problem: Heart Failure: Day 3 Goal: Treatments/Interventions/Procedures Outcome: Progressing Towards Goal 
Note:  
Last 3 Recorded Weights in this Encounter 06/15/19 0862 06/16/19 0350 06/17/19 0335 Weight: 97.4 kg (214 lb 11.2 oz) 96.5 kg (212 lb 12.8 oz) 96.1 kg (211 lb 12.8 oz) Pt on Bumex gtt. Continuing to diurese patient. Weights are on standing scale.

## 2019-06-18 ENCOUNTER — ANESTHESIA EVENT (OUTPATIENT)
Dept: CARDIAC CATH/INVASIVE PROCEDURES | Age: 69
DRG: 222 | End: 2019-06-18
Payer: MEDICARE

## 2019-06-18 ENCOUNTER — ANESTHESIA (OUTPATIENT)
Dept: CARDIAC CATH/INVASIVE PROCEDURES | Age: 69
DRG: 222 | End: 2019-06-18
Payer: MEDICARE

## 2019-06-18 LAB
ALBUMIN SERPL-MCNC: 2.8 G/DL (ref 3.5–5)
ALBUMIN/GLOB SERPL: 0.7 {RATIO} (ref 1.1–2.2)
ALP SERPL-CCNC: 88 U/L (ref 45–117)
ALT SERPL-CCNC: 23 U/L (ref 12–78)
ANION GAP SERPL CALC-SCNC: 7 MMOL/L (ref 5–15)
AST SERPL-CCNC: 16 U/L (ref 15–37)
ATRIAL RATE: 69 BPM
ATRIAL RATE: 78 BPM
BILIRUB SERPL-MCNC: 0.4 MG/DL (ref 0.2–1)
BNP SERPL-MCNC: 4009 PG/ML
BUN SERPL-MCNC: 34 MG/DL (ref 6–20)
BUN/CREAT SERPL: 15 (ref 12–20)
CALCIUM SERPL-MCNC: 9.1 MG/DL (ref 8.5–10.1)
CALCULATED P AXIS, ECG09: 13 DEGREES
CALCULATED R AXIS, ECG10: 24 DEGREES
CALCULATED R AXIS, ECG10: 36 DEGREES
CALCULATED T AXIS, ECG11: 106 DEGREES
CALCULATED T AXIS, ECG11: 136 DEGREES
CHLORIDE SERPL-SCNC: 97 MMOL/L (ref 97–108)
CO2 SERPL-SCNC: 31 MMOL/L (ref 21–32)
COMMENT, HOLDF: NORMAL
CREAT SERPL-MCNC: 2.21 MG/DL (ref 0.7–1.3)
DIAGNOSIS, 93000: NORMAL
DIAGNOSIS, 93000: NORMAL
ERYTHROCYTE [DISTWIDTH] IN BLOOD BY AUTOMATED COUNT: 13.6 % (ref 11.5–14.5)
GLOBULIN SER CALC-MCNC: 3.9 G/DL (ref 2–4)
GLUCOSE SERPL-MCNC: 98 MG/DL (ref 65–100)
HCT VFR BLD AUTO: 33.8 % (ref 36.6–50.3)
HGB BLD-MCNC: 10.9 G/DL (ref 12.1–17)
MAGNESIUM SERPL-MCNC: 1 MG/DL (ref 1.6–2.4)
MCH RBC QN AUTO: 30 PG (ref 26–34)
MCHC RBC AUTO-ENTMCNC: 32.2 G/DL (ref 30–36.5)
MCV RBC AUTO: 93.1 FL (ref 80–99)
NRBC # BLD: 0 K/UL (ref 0–0.01)
NRBC BLD-RTO: 0 PER 100 WBC
P-R INTERVAL, ECG05: 260 MS
PLATELET # BLD AUTO: 287 K/UL (ref 150–400)
PMV BLD AUTO: 9 FL (ref 8.9–12.9)
POTASSIUM SERPL-SCNC: 3.9 MMOL/L (ref 3.5–5.1)
PROT SERPL-MCNC: 6.7 G/DL (ref 6.4–8.2)
Q-T INTERVAL, ECG07: 458 MS
Q-T INTERVAL, ECG07: 506 MS
QRS DURATION, ECG06: 164 MS
QRS DURATION, ECG06: 172 MS
QTC CALCULATION (BEZET), ECG08: 497 MS
QTC CALCULATION (BEZET), ECG08: 542 MS
RBC # BLD AUTO: 3.63 M/UL (ref 4.1–5.7)
SAMPLES BEING HELD,HOLD: NORMAL
SODIUM SERPL-SCNC: 135 MMOL/L (ref 136–145)
VENTRICULAR RATE, ECG03: 69 BPM
VENTRICULAR RATE, ECG03: 71 BPM
WBC # BLD AUTO: 5.2 K/UL (ref 4.1–11.1)

## 2019-06-18 PROCEDURE — 93005 ELECTROCARDIOGRAM TRACING: CPT

## 2019-06-18 PROCEDURE — 74011250637 HC RX REV CODE- 250/637: Performed by: INTERNAL MEDICINE

## 2019-06-18 PROCEDURE — 92960 CARDIOVERSION ELECTRIC EXT: CPT | Performed by: INTERNAL MEDICINE

## 2019-06-18 PROCEDURE — 74011250636 HC RX REV CODE- 250/636

## 2019-06-18 PROCEDURE — 77010033678 HC OXYGEN DAILY

## 2019-06-18 PROCEDURE — 93451 RIGHT HEART CATH: CPT | Performed by: INTERNAL MEDICINE

## 2019-06-18 PROCEDURE — 77030013797 HC KT TRNSDUC PRSSR EDWD -A: Performed by: INTERNAL MEDICINE

## 2019-06-18 PROCEDURE — 77030013406 HC CATH CTRL EDWD -B: Performed by: INTERNAL MEDICINE

## 2019-06-18 PROCEDURE — 74011250636 HC RX REV CODE- 250/636: Performed by: INTERNAL MEDICINE

## 2019-06-18 PROCEDURE — C1751 CATH, INF, PER/CENT/MIDLINE: HCPCS | Performed by: INTERNAL MEDICINE

## 2019-06-18 PROCEDURE — 76060000031 HC ANESTHESIA FIRST 0.5 HR: Performed by: INTERNAL MEDICINE

## 2019-06-18 PROCEDURE — 65660000001 HC RM ICU INTERMED STEPDOWN

## 2019-06-18 PROCEDURE — 77030013744: Performed by: INTERNAL MEDICINE

## 2019-06-18 PROCEDURE — 4A023N6 MEASUREMENT OF CARDIAC SAMPLING AND PRESSURE, RIGHT HEART, PERCUTANEOUS APPROACH: ICD-10-PCS | Performed by: INTERNAL MEDICINE

## 2019-06-18 PROCEDURE — 80053 COMPREHEN METABOLIC PANEL: CPT

## 2019-06-18 PROCEDURE — 85027 COMPLETE CBC AUTOMATED: CPT

## 2019-06-18 PROCEDURE — 65660000000 HC RM CCU STEPDOWN

## 2019-06-18 PROCEDURE — C1894 INTRO/SHEATH, NON-LASER: HCPCS | Performed by: INTERNAL MEDICINE

## 2019-06-18 PROCEDURE — 5A2204Z RESTORATION OF CARDIAC RHYTHM, SINGLE: ICD-10-PCS | Performed by: INTERNAL MEDICINE

## 2019-06-18 PROCEDURE — 74011000250 HC RX REV CODE- 250: Performed by: NURSE PRACTITIONER

## 2019-06-18 PROCEDURE — 99232 SBSQ HOSP IP/OBS MODERATE 35: CPT | Performed by: INTERNAL MEDICINE

## 2019-06-18 PROCEDURE — 83880 ASSAY OF NATRIURETIC PEPTIDE: CPT

## 2019-06-18 PROCEDURE — 36415 COLL VENOUS BLD VENIPUNCTURE: CPT

## 2019-06-18 PROCEDURE — C1892 INTRO/SHEATH,FIXED,PEEL-AWAY: HCPCS | Performed by: INTERNAL MEDICINE

## 2019-06-18 PROCEDURE — 83735 ASSAY OF MAGNESIUM: CPT

## 2019-06-18 PROCEDURE — B2141ZZ FLUOROSCOPY OF RIGHT HEART USING LOW OSMOLAR CONTRAST: ICD-10-PCS | Performed by: INTERNAL MEDICINE

## 2019-06-18 RX ORDER — HEPARIN SODIUM 200 [USP'U]/100ML
INJECTION, SOLUTION INTRAVENOUS
Status: COMPLETED | OUTPATIENT
Start: 2019-06-18 | End: 2019-06-18

## 2019-06-18 RX ORDER — PROPOFOL 10 MG/ML
INJECTION, EMULSION INTRAVENOUS AS NEEDED
Status: DISCONTINUED | OUTPATIENT
Start: 2019-06-18 | End: 2019-06-18 | Stop reason: HOSPADM

## 2019-06-18 RX ORDER — LIDOCAINE HYDROCHLORIDE 10 MG/ML
INJECTION INFILTRATION; PERINEURAL AS NEEDED
Status: DISCONTINUED | OUTPATIENT
Start: 2019-06-18 | End: 2019-06-18 | Stop reason: HOSPADM

## 2019-06-18 RX ORDER — MAGNESIUM SULFATE HEPTAHYDRATE 40 MG/ML
2 INJECTION, SOLUTION INTRAVENOUS ONCE
Status: COMPLETED | OUTPATIENT
Start: 2019-06-18 | End: 2019-06-18

## 2019-06-18 RX ORDER — MILRINONE LACTATE 0.2 MG/ML
0.2 INJECTION, SOLUTION INTRAVENOUS CONTINUOUS
Status: DISCONTINUED | OUTPATIENT
Start: 2019-06-18 | End: 2019-06-22

## 2019-06-18 RX ORDER — ALPRAZOLAM 0.25 MG/1
0.25 TABLET ORAL ONCE
Status: COMPLETED | OUTPATIENT
Start: 2019-06-18 | End: 2019-06-18

## 2019-06-18 RX ORDER — MAGNESIUM SULFATE 1 G/100ML
1 INJECTION INTRAVENOUS ONCE
Status: DISCONTINUED | OUTPATIENT
Start: 2019-06-18 | End: 2019-06-18

## 2019-06-18 RX ADMIN — ASPIRIN 81 MG: 81 TABLET ORAL at 08:07

## 2019-06-18 RX ADMIN — Medication 10 ML: at 20:41

## 2019-06-18 RX ADMIN — MAGNESIUM SULFATE IN WATER 2 G: 40 INJECTION, SOLUTION INTRAVENOUS at 09:19

## 2019-06-18 RX ADMIN — Medication 10 ML: at 07:17

## 2019-06-18 RX ADMIN — SPIRONOLACTONE 25 MG: 25 TABLET ORAL at 08:07

## 2019-06-18 RX ADMIN — MILRINONE LACTATE IN DEXTROSE 0.2 MCG/KG/MIN: 200 INJECTION, SOLUTION INTRAVENOUS at 18:03

## 2019-06-18 RX ADMIN — CARVEDILOL 12.5 MG: 12.5 TABLET, FILM COATED ORAL at 08:07

## 2019-06-18 RX ADMIN — AMIODARONE HYDROCHLORIDE 200 MG: 200 TABLET ORAL at 18:02

## 2019-06-18 RX ADMIN — PROPOFOL 50 MG: 10 INJECTION, EMULSION INTRAVENOUS at 09:57

## 2019-06-18 RX ADMIN — AMIODARONE HYDROCHLORIDE 200 MG: 200 TABLET ORAL at 08:07

## 2019-06-18 RX ADMIN — Medication 10 ML: at 14:49

## 2019-06-18 RX ADMIN — APIXABAN 5 MG: 5 TABLET, FILM COATED ORAL at 20:40

## 2019-06-18 RX ADMIN — BUMETANIDE 0.5 MG/HR: 0.25 INJECTION INTRAMUSCULAR; INTRAVENOUS at 18:02

## 2019-06-18 RX ADMIN — CARVEDILOL 12.5 MG: 12.5 TABLET, FILM COATED ORAL at 16:12

## 2019-06-18 RX ADMIN — ALPRAZOLAM 0.25 MG: 0.25 TABLET ORAL at 18:48

## 2019-06-18 RX ADMIN — PROPOFOL 20 MG: 10 INJECTION, EMULSION INTRAVENOUS at 09:59

## 2019-06-18 RX ADMIN — BUMETANIDE 2 MG: 0.25 INJECTION INTRAMUSCULAR; INTRAVENOUS at 08:07

## 2019-06-18 NOTE — PROGRESS NOTES
Problem: Heart Failure: Day 5 Goal: Activity/Safety Outcome: Progressing Towards Goal 
Note:  
Pt up with one assist. 
Goal: Diagnostic Test/Procedures Outcome: Progressing Towards Goal 
Note:  
Cardioversion and RHC today Goal: Medications Outcome: Progressing Towards Goal 
Note:  
Off of milrinone drip this morning. Receiving coreg, amiodarone, bumex, and spironolactone Goal: Respiratory Outcome: Progressing Towards Goal 
Note: On room air and maintaining SpO2>90%. Wears 2 L PRN for comfort. No complaints of dyspnea. Will continue to monitor. Problem: Falls - Risk of 
Goal: *Absence of Falls Description Document [place] Fall Risk and appropriate interventions in the flowsheet. Outcome: Progressing Towards Goal 
Note:  
Fall Risk Interventions: 
Mobility Interventions: Communicate number of staff needed for ambulation/transfer, PT Consult for mobility concerns, PT Consult for assist device competence, Strengthening exercises (ROM-active/passive), Utilize walker, cane, or other assistive device Medication Interventions: Assess postural VS orthostatic hypotension, Evaluate medications/consider consulting pharmacy, Patient to call before getting OOB, Teach patient to arise slowly Elimination Interventions: Call light in reach, Patient to call for help with toileting needs, Stay With Me (per policy), Toileting schedule/hourly rounds, Toilet paper/wipes in reach, Urinal in reach History of Falls Interventions: Door open when patient unattended, Evaluate medications/consider consulting pharmacy Problem: Cath Lab Procedures: Post-Cath Day of Procedure (Initiate SCIP Measures for Post-Op Care) Goal: Activity/Safety Outcome: Progressing Towards Goal 
Note:  
Pt up ad sheron. Goal: Treatments/Interventions/Procedures Outcome: Progressing Towards Goal 
Note:  
Vital signs stable.   
Goal: *Procedure site is without bleeding and signs of infection six hours post sheath removal 
 Outcome: Progressing Towards Goal 
Note:  
Cath site is clean dry and intact. No evidence of bleeding and hematoma.

## 2019-06-18 NOTE — PROGRESS NOTES
1800: Pt c/o some SOB and an general \"uneasiness\", unable to lay down. SpO2 maintaining in 95-98% respirations between 18-22. O2 increased to 4L. 1815: Pt still not feeling much relief. Lungs have slight crackles in bases. Bumex gtt and milrinone gtt restarted per order. Dr. Du Murrell paged. 1820: Received order for one time dose of xanax 
 
1930: Bedside shift change report given to Jelena De Paz RN (oncoming nurse) by Rosemarie Rowland RN (offgoing nurse). Report included the following information SBAR, Kardex, Intake/Output, MAR, Recent Results and Cardiac Rhythm NSR w/1 Degree AVB and BBB.

## 2019-06-18 NOTE — PROCEDURES
Cardiac Catheterization Procedure Note Patient: Prince Carias  MRN: [id number]  SSN: [id number] YOB: 1950 Age: 76 y.o. Sex: male Date of Procedure: 6/18/2019 Pre-procedure Diagnosis: Congestive Heart Failure Post-procedure Diagnosis: Congestive Heart Failure Procedure: Right Heart Cath, moderate sedation :  Dr. Sonya Parish MD 
 
Assistant(s):  None Anesthesia: Moderate Sedation Estimated Blood Loss: Less than 10 mL Specimens Removed: None Findings: Elevated intracardiac pressure (full findings in results section) Complications: None Implants:  None Signed by:  Sonya Parish MD  6/18/2019  12:00 PM 
 
 
Full results - Vitals VO2 Value: 256.05 ml/min ; Hb: 10.9 %  Blood Oximetry Information AV Hb PV: 0 gm/dL  Pressure Phase Phase: Phase: Baseline  PA Pressures PA Systolic: 65 mmHg ; PA Diastolic: 16 mmHg ; PA Mean: 42 mmHg  RV Pressures RV Systolic: 65 mmHg ; RV End Diastolic: 10 mmHg ; RV dP/dt: 576 mmHg/sec  RA Pressures RA Wedge A Wave: 14 mmHg ; RA Wedge V Wave: 11 mmHg ; RA Wedge Mean: 10 mmHg  PCW Pressures PCW A Wave: 27 mmHg ; PCW V Wave: 51 mmHg ; PCW Mean: 31 mmHg  Atrial Wedge Pressures Source: Measured ; RA Atrial Wedge Heart Rate: 66 bpm  Cath Pressure PCW Source: Measured ; PCW Heart Rate: 66 bpm  Cardiac Output Results Brad C.O.: 5.72 L/min ; Brad C. I.: 2.56 L/min/m2 ; Brad HR: 67 bpm ; QPI: 2.56 L/min/m2 ; QSI Value: 2.56 L/min/m2  Resistance Results PVR: 153.83 dsc-5 ; PVR-I: 343.08 dsc-5/m2 ; TPR: 587.33 dsc-5 ; TPR-I: 1309.93 dsc-5/m2  Stroke Work Results RVSW: 37.71 gm*m ; RVSW-I: 16.91 gm*m/m2

## 2019-06-18 NOTE — PROGRESS NOTES
[address] [facility] 904 [facility] in [place], [place] Inpatient Progress Note Patient name: Tammie Vidal Patient : 1950 Patient MRN: [id number] Attending MD: Moira Lafleur MD 
Date of service: 19 CHIEF COMPLAINT: 
Acute on chronic systolic heart failure Recent Events: RHC shows elevated filling pressures No CO/CI recorded Successful DCCV  
  
Plan:  
Continue inotrope assisted diuresis until euvolemic Then proceed to CRT upgrade with inotropic bridge Resume milrinone 0.2 mcg/kg/min Resume bumex gtt 0.5 mg/hr Continue spironolactone 25mg daily Continue amiodarone for rate control; may need to reduce coreg dose as we diurese Intolerant to ACE/ARB/ARNi due to renal dysfunction and while aggressively diuresed Ambulate daily PT/OT 
  
IMPRESSION: 
Fatigue Shortness of breath Volume overload Pulmonary edema Acute on chronic systolic heart failure Stage C, NYHA class IIIA symptoms Coronary artery disease S/p arrest VFib and subsequent CABG  Sternal wound infection requiring sternectomy Ischemic cardiomyopathy, LVEF 20% 10/18 Atrial fibrillation s/p failed DCCV 6/10/19 Chronic anticoagulation with eliquis S/p ICD 3/21/11 LBBB,  ms Acute on CKD, stage 3 
UTI Urinary retension Anemia, iron deficiency H/o DVT Migraines JACQUE on 19 showed thrombus H/o tobacco abuse Nighttime desaturations, likely JOSE Body mass index is 27.99 kg/m². Patient seen and examined. Data and note reviewed. I have discussed and agree with the plans as noted. 76year old male with a history of ICM, PAF, LBBB who has had recurrent admissions for acute on chronic systolic heart failure. RHC today reveals elevated PCWP - will resume IV milrinone. Successful DCCV with conversion of AF to SR. Agree with BiV upgrade and possible AF ablation once his systolic heart failure has been stabilized. Thank you for allowing us to participate in your patient's care. Mounika Jones MD, Mellissa Schlatter Chief of Cardiology, [facility] Medical Director Cal[facility] Francis Rueda [address] [place] [address], [address] Office [phone] Fax [phone] CARDIAC IMAGING: 
Echo (5/31/19) LVEF 21-25%, severely dilated LA, moderately dilated RA 
EKG (6/12/19) atrial flutter with occasional PVCs, LBBB QRS 158ms [facility] not in epic 
  
HEMODYNAMICS: 
RHC not done CPEST not done 6MW not done 
  
OTHER IMAGING: 
CT chest (5/31/19) 1. No evidence of pulmonary embolus. 2. Interstitial edema and small bilateral pleural effusions. 3. Mild mediastinal adenopathy. 
  
INTERVAL HISTORY: 
Briefly, this is a 75 y/o pleasant white male with h/o HTN, HL, likey JOSE, CAD s/p cardiac arrest VFib s/p CABG (2011) c/b sternal would infection and sternotomy, ischemic cardiomyopathy LVEF 15-20%, s/p ICD and with LBBB. Patient admitted with acute on chronic systolic heart failure with massive volume overload > 20 lbs, in the setting of atrial fibrillation s/p failed DCCV and new UTI now treated with IV antibiotics. Abdomen: Soft, no mass or tenderness. No organomegaly or hernia. Bowel sounds present. Genitourinary and rectal: deferred Extremities: No cyanosis, clubbing, or edema. Neurologic: No focal sensory or motor deficits are noted. Grossly intact. Psychiatric: Awake, alert an doriented x 3. Appropriate mood and affect. Skin: Warm, dry and well perfused. No lesions, nodules or rashes are noted. REVIEW OF SYSTEMS: 
General: Denies fever, night sweats. Ear, nose and throat: Denies difficulty hearing, sinus problems, runny nose, post-nasal drip, ringing in ears, mouth sores, loose teeth, ear pain, nosebleeds, sore throate, facial pain or numbess Cardiovascular: see above in the interval history Respiratory: Denies cough, wheezing, sputum production, hemoptysis. Gastrointestinal: Denies heartburn, constipation, intolerance to certain foods, diarrhea, abdominal pain, nausea, vomiting, difficulty swallowing, blood in stool Kidney and bladder: Denies painful urination, frequent urination, urgency, prostate problems and impotence Musculoskeletal: Denies joint pain, muscle weakness Skin and hair: Denies change in existing skin lesions, hair loss or increase, breast changes PAST MEDICAL HISTORY: 
Past Medical History:  
Diagnosis Date  Degenerative disc disease, lumbar  Heart failure ([facility]  High cholesterol  Hypertension  Paroxysmal atrial fibrillation ([facility] 2019  Spinal stenosis PAST SURGICAL HISTORY: 
Past Surgical History:  
Procedure Laterality Date  HX APPENDECTOMY  HX CORONARY ARTERY BYPASS GRAFT    
 triple  HX HERNIA REPAIR    
 HX IMPLANTABLE CARDIOVERTER DEFIBRILLATOR  TN CARDIOVERSION ELECTIVE ARRHYTHMIA EXTERNAL N/A 6/10/2019 EP CARDIOVERSION performed by Eveline Michaels MD at [address], [facility]/s  CATH LAB  TN CARDIOVERSION ELECTIVE ARRHYTHMIA EXTERNAL N/A 2019 EP CARDIOVERSION performed by Trever Jackson MD at [address], [facility]/s Dr CATH LAB FAMILY HISTORY: 
History reviewed. No pertinent family history. SOCIAL HISTORY: 
Social History Socioeconomic History  Marital status:  Spouse name: Not on file  Number of children: Not on file  Years of education: Not on file  Highest education level: Not on file Tobacco Use  Smoking status: Former Smoker Last attempt to quit: 2010 Years since quittin.5  Smokeless tobacco: Never Used Substance and Sexual Activity  Alcohol use: Yes Comment: rarely  Drug use: Never LABORATORY RESULTS: 
  
Labs Latest Ref Rng & Units 2019 2019 2019 6/15/2019 2019 2019 2019 WBC 4.1 - 11.1 K/uL 5.2 5.8 5.7 5.4 6.2 - -  
 RBC 4.10 - 5.70 M/uL 3.63(L) 3.59(L) 3.83(L) 3.36(L) 3.61(L) - - Hemoglobin 12.1 - 17.0 g/dL 10. 9(L) 10. 8(L) 11. 6(L) 10. 0(L) 11. 0(L) - - Hematocrit 36.6 - 50.3 % 33. 8(L) 33. 2(L) 35. 6(L) 31. 3(L) 34. 3(L) - - MCV 80.0 - 99.0 FL 93.1 92.5 93.0 93.2 95.0 - - Platelets 804 - 155 K/uL 287 296 267 322 293 - - Lymphocytes 12 - 49 % - - - - - - - Monocytes 5 - 13 % - - - - - - - Eosinophils 0 - 7 % - - - - - - - Basophils 0 - 1 % - - - - - - - Bands 0 - 6 % - - - - - - - Albumin 3.5 - 5.0 g/dL 2. 8(L) 2. 7(L) 2. 7(L) 2. 8(L) - 2. 5(L) -  
Calcium 8.5 - 10.1 MG/DL 9.1 9.1 9.0 8.7 9.0 8.6 8. 1(L) SGOT 15 - 37 U/L 16 14(L) 15 12(L) - 13(L) -  
Glucose 65 - 100 mg/dL 98 95 92 114(H) 106(H) 94 102(H) BUN 6 - 20 MG/DL 34(H) 32(H) 31(H) 31(H) 30(H) 31(H) 30(H) Creatinine 0.70 - 1.30 MG/DL 2.21(H) 2.13(H) 1.93(H) 1.94(H) 1.76(H) 1.74(H) 1.74(H) Sodium 136 - 145 mmol/L 135(L) 136 137 137 137 137 136 Potassium 3.5 - 5.1 mmol/L 3.9 3.8 4.2 3.6 4.4 4.1 3.8 TSH 0.36 - 3.74 uIU/mL - - - - - - - Some recent data might be hidden Lab Results Component Value Date/Time TSH 2.45 06/01/2019 04:16 AM  
 
 
CURRENT MEDICATIONS: 
 
Current Facility-Administered Medications:  
  bumetanide (BUMEX) 0.25 mg/mL infusion, 0.5 mg/hr, IntraVENous, CONTINUOUS, Soraya Herrera, NP 
  evolocumab (REPATHA SURECLICK) pen injection 523 mg (Patient Supplied), 140 mg, SubCUTAneous, Q 14 DAYS, Na Caceres MD, 140 mg at 06/14/19 1735   spironolactone (ALDACTONE) tablet 25 mg, 25 mg, Oral, DAILY, Tammie Russell MD, 25 mg at 06/18/19 4820 
  melatonin tablet 3 mg, 3 mg, Oral, QHS PRN, Nayely Sanchez MD, 3 mg at 06/09/19 2125 
  amiodarone (CORDARONE) tablet 200 mg, 200 mg, Oral, BID, Tammie Russell MD, 200 mg at 06/18/19 2802   apixaban (ELIQUIS) tablet 5 mg, 5 mg, Oral, Q12H, Lizet Beal MD, Stopped at 06/18/19 0900   aspirin delayed-release tablet 81 mg, 81 mg, Oral, DAILY, Lian Jose Sky MD, 81 mg at 06/18/19 2394   carvedilol (COREG) tablet 12.5 mg, 12.5 mg, Oral, BID WITH MEALS, Zulay Graham MD, 12.5 mg at 06/18/19 1612   sodium chloride (NS) flush 5-40 mL, 5-40 mL, IntraVENous, Q8H, Lizet Beal MD, 10 mL at 06/18/19 1449   sodium chloride (NS) flush 5-40 mL, 5-40 mL, IntraVENous, PRN, Lizet Beal MD, 10 mL at 06/03/19 1958   ondansetron (ZOFRAN) injection 4 mg, 4 mg, IntraVENous, Q4H PRN, Lizet Beal MD 
  bisacodyl (DULCOLAX) tablet 5 mg, 5 mg, Oral, DAILY PRN, Lizet Beal MD 
  acetaminophen (TYLENOL) tablet 650 mg, 650 mg, Oral, Q4H PRN, Lizet Beal MD, 650 mg at 06/05/19 1642   morphine injection 2 mg, 2 mg, IntraVENous, Q4H PRN, Lizet Beal MD 
 
 
Thank you for allowing me to participate in this patient's care. Khadra Melo, [facility] Rueda [address], [address] Phone: [phone] Fax: [phone]

## 2019-06-18 NOTE — PROGRESS NOTES
Problem: Falls - Risk of 
Goal: *Absence of Falls Description Document Rich Carlita Fall Risk and appropriate interventions in the flowsheet. Outcome: Progressing Towards Goal 
Note:  
Fall Risk Interventions: 
Mobility Interventions: Patient to call before getting OOB, PT Consult for mobility concerns, PT Consult for assist device competence Medication Interventions: Evaluate medications/consider consulting pharmacy, Teach patient to arise slowly, Patient to call before getting OOB Elimination Interventions: Call light in reach, Patient to call for help with toileting needs, Toilet paper/wipes in reach, Toileting schedule/hourly rounds, Urinal in reach History of Falls Interventions: Door open when patient unattended Problem: Urinary Tract Infection - Adult Goal: *Absence of infection signs and symptoms Outcome: Progressing Towards Goal 
Note:  
Voiding clear yellow urine

## 2019-06-18 NOTE — PROGRESS NOTES
Patient received in cath lab RR; hooked to monitoring equipment; armband; consent; allergies checked.

## 2019-06-18 NOTE — PROGRESS NOTES
0800: Milrinone wasn't discontinued until 0745. Cath lab notified. 0845: TRANSFER - OUT REPORT: 
 
Verbal report given to Promise Morales RN(name) on [address]  being transferred to Cath lab(unit) for ordered procedure Report consisted of patients Situation, Background, Assessment and  
Recommendations(SBAR). Information from the following report(s) SBAR, Kardex, [facility] and Cardiac Rhythm A fib/flutter was reviewed with the receiving nurse. Lines:  
Peripheral IV 06/06/19 Right Hand (Active) Site Assessment Clean, dry, & intact 6/18/2019 12:49 AM  
Phlebitis Assessment 0 6/18/2019 12:49 AM  
Infiltration Assessment 0 6/18/2019 12:49 AM  
Dressing Status Clean, dry, & intact 6/18/2019 12:49 AM  
Dressing Type Transparent 6/18/2019 12:49 AM  
Hub Color/Line Status Blue;Capped 6/18/2019 12:49 AM  
Action Taken Open ports on tubing capped 6/18/2019 12:49 AM  
Alcohol Cap Used Yes 6/18/2019 12:49 AM  
   
Peripheral IV 06/08/19 Right Forearm (Active) Site Assessment Clean, dry, & intact 6/18/2019 12:49 AM  
Phlebitis Assessment 0 6/18/2019 12:49 AM  
Infiltration Assessment 0 6/18/2019 12:49 AM  
Dressing Status Clean, dry, & intact 6/18/2019 12:49 AM  
Dressing Type Transparent 6/18/2019 12:49 AM  
Hub Color/Line Status Pink; Infusing 6/18/2019 12:49 AM  
Action Taken Open ports on tubing capped 6/18/2019 12:49 AM  
Alcohol Cap Used Yes 6/18/2019 12:49 AM  
   
Peripheral IV 06/15/19 Right Antecubital (Active) Site Assessment Clean, dry, & intact 6/18/2019 12:49 AM  
Phlebitis Assessment 0 6/18/2019 12:49 AM  
Infiltration Assessment 0 6/18/2019 12:49 AM  
Dressing Status Clean, dry, & intact 6/18/2019 12:49 AM  
Dressing Type Transparent 6/18/2019 12:49 AM  
Hub Color/Line Status Pink;Capped 6/18/2019 12:49 AM  
Action Taken Open ports on tubing capped 6/18/2019 12:49 AM  
Alcohol Cap Used Yes 6/18/2019 12:49 AM  
  
 
Opportunity for questions and clarification was provided.

## 2019-06-18 NOTE — PROGRESS NOTES
Pt is off the unit in the CCL. PT will follow and see as appropriate.  Thank you, Jasmin Ferro, PT

## 2019-06-18 NOTE — PROGRESS NOTES
TRANSFER - IN REPORT: 
 
Verbal report received from Travis Martinesim CVT on [address]  being received from procedure area for routine progression of care. Report consisted of patients Situation, Background, Assessment and Recommendations(SBAR). Information from the following report(s) SBAR, Procedure Summary, MAR, Recent Results and Cardiac Rhythm NSR was reviewed with the receiving clinician. Opportunity for questions and clarification was provided. Assessment completed upon patients arrival to [address] [place] [address] and care assumed. Cardiac Cath Lab Recovery Arrival Note: 
 
Marta Ovalles arrived to [facility] recovery area. Patient procedure= RHC/DCCV. Patient on cardiac monitor, non-invasive blood pressure, SPO2 monitor. On Room Air . IV  of NS on pump at 25 ml/hr. Patient status doing well without problems. Patient is A&Ox 4. Patient reports No pain. PROCEDURE SITE CHECK: 
 
Procedure site:without any bleeding and No Hematoma, No pain/discomfort reported at procedure site. No change in patient status. Continue to monitor patient and status.

## 2019-06-18 NOTE — ANESTHESIA PREPROCEDURE EVALUATION
Relevant Problems No relevant active problems Anesthetic History No history of anesthetic complications Review of Systems / Medical History Patient summary reviewed, nursing notes reviewed and pertinent labs reviewed Pulmonary Within defined limits Neuro/Psych Within defined limits Cardiovascular Hypertension CHF Dysrhythmias : atrial fibrillation Past MI, CAD and CABG 
 
 
  
GI/Hepatic/Renal 
Within defined limits Endo/Other Arthritis Other Findings Physical Exam 
 
Airway Mallampati: II 
TM Distance: > 6 cm Neck ROM: normal range of motion Mouth opening: Normal 
 
 Cardiovascular Rhythm: irregular Dental 
No notable dental hx Pulmonary Breath sounds clear to auscultation Abdominal 
GI exam deferred Other Findings Anesthetic Plan ASA: 3 Anesthesia type: MAC Induction: Intravenous Anesthetic plan and risks discussed with: Patient

## 2019-06-18 NOTE — PROCEDURES
Pt taken to procedure room in fasted state after signed consent. Time out performed Anesthesia as per anesthesia team 
 
Successful DCCV of PAF-> NSR with x1 200 J synchronized shock. Pt taken back to recovery room in stable condition

## 2019-06-18 NOTE — PROGRESS NOTES
TRANSFER - OUT REPORT: 
 
Verbal report given to [employer], [facility] on Aaron Martinez being transferred to cathb lab recovery for routine progression of care Report consisted of patients Situation, Background, Assessment and  
Recommendations(SBAR). Information from the following report(s) Procedure Summary was reviewed with the receiving nurse. Opportunity for questions and clarification was provided. Cardiac Cath Lab Procedure Area Arrival Note: 
 
Aaron Martinez arrived to Cardiac Cath Lab, Procedure Area. Patient identifiers verified with NAME and DATE OF BIRTH. Procedure verified with patient. Consent forms verified. Allergies verified. Patient informed of procedure and plan of care. Questions answered with review. Patient voiced understanding of procedure and plan of care. Patient on cardiac monitor, non-invasive blood pressure, SPO2 monitor. On  or O2 @ 2 lpm via nasal canula. Airway management and monitoring to be performed by CRNA. IV of 0.9% NS on pump at 25 ml/hr. Patient status doing well with no problems. Patient is A&Ox 4. Patient reports no pain. Patient medicated during procedure with orders obtained and verified by Dr. Diaz Hernández. Refer to patients Cardiac Cath Lab PROCEDURE REPORT for vital signs, assessment, status, and response during procedure, printed at end of case. Printed report on chart or scanned into chart.

## 2019-06-18 NOTE — PROGRESS NOTES
[facility] Cardiology Progress Note                                        UNXSO Date: 5/31/2019 
  
  
Appreciate input from AHF and EP re patient.  
Pt reports he is feeling pretty good. Wt remained unchanged today 
  
Assessment/Plan: # Acute on chronic systolic HF -EF 04% 
- improved initially  with diuretics, but creat increased and BP dropped; changed dobutamine to milrinone last monday as BP had increased;  improved diuresis with IV bumex but due to rising Cr, switched back to IV bolus. -RHC to be done today; off milrinone since earlier this am 
  
# AF -reverted to afib, cont eliquis and amiodarone; in aflutter, CV last monday with recurrent flutter; repeat CV today 
- Consider ablation in future once he has been optimized.  
  
# LBBB - Will plan upgrade to BiV ICD/pacer perhaps later this week 
  
# Acute on chronic kidney injury-renal US normal; stabilized, though higher than his baseline; UO good 
  
# fever- urine culture positive with gram-rods; on rocephin-last day wednesday; repeat culture negative; afebrile for several days 
  #urinary retention-urology suggested home with neal but enal removed; urinating fine 
  
# Anemia-iron deficient; venofer suggested # Hypomagnesiemia-will give dose IV this am 
 
Exam: 
Appears comfortable almost supine in bed Blood pressure 107/76, pulse 74, temperature 98 °F (36.7 °C), resp. rate 20, weight 96.4 kg (212 lb 8.4 oz), SpO2 95 %. Lungs with rales at left base Cor irreg with S3 Ext 1+ edema Labs: 
Mg 1.0 
K 3.9 Cr 2.2 Antwan Sneed MD

## 2019-06-18 NOTE — PROGRESS NOTES
TRANSFER - OUT REPORT: 
 
Verbal report given to Teresita on Arianna Wynne being transferred to [address] [place] [address] for routine progression of care Report consisted of patients Situation, Background, Assessment and  
Recommendations(SBAR). Information from the following report(s) SBAR was reviewed with the receiving nurse. Opportunity for questions and clarification was provided.

## 2019-06-18 NOTE — ANESTHESIA POSTPROCEDURE EVALUATION
Post-Anesthesia Evaluation and Assessment Patient: Montserrat Bryant MRN: [id number]  SSN: [id number] YOB: 1950  Age: 76 y.o. Sex: male I have evaluated the patient and they are stable and ready for discharge from the PACU. Cardiovascular Function/Vital Signs Visit Vitals BP 97/68 Pulse 72 Temp 36.7 °C (98 °F) Resp 24 Wt 96.4 kg (212 lb 8.4 oz) SpO2 90% BMI 27.29 kg/m² Patient is status post General anesthesia for Procedure(s): EP CARDIOVERSION 
RIGHT HEART CATH. Nausea/Vomiting: None Postoperative hydration reviewed and adequate. Pain: 
Pain Scale 1: Numeric (0 - 10) (06/18/19 1041) Pain Intensity 1: 0 (06/18/19 1041) Managed Neurological Status:  
Neuro Neurologic State: Alert (06/18/19 0800) Orientation Level: Oriented X4 (06/18/19 0800) Cognition: Appropriate decision making; Appropriate for age attention/concentration; Appropriate safety awareness; Follows commands (06/18/19 0800) At baseline Mental Status, Level of Consciousness: Alert and  oriented to person, place, and time Pulmonary Status:  
O2 Device: Nasal cannula (06/18/19 1008) Adequate oxygenation and airway patent Complications related to anesthesia: None Post-anesthesia assessment completed. No concerns Signed By: Ada Huitron MD   
 June 18, 2019 Procedure(s): EP CARDIOVERSION 
RIGHT HEART CATH. MAC 
 
<BSHSIANPOST> Vitals Value Taken Time BP Temp Pulse Resp 25 6/18/2019 10:55 AM  
SpO2 94 % 6/18/2019 10:55 AM  
Vitals shown include unvalidated device data.

## 2019-06-18 NOTE — CONSULTS
EP/ ARRHYTHMIA CONSULT requested by Dr. Emilee Valencia secondary to atrial fibrillation Patient ID: 
Patient: Reid Baumann  MRN: [id number] Age: 76 y.o.  : 1950 Date of  Admission: 2019  7:03 PM  
PCP:  Conrado Story MD 
 
Assessment: 1. Atrial fibrillation, recurrent after cardioversion and persistent. Symptomatic. 2. Chronic ischemic cardiomyopathy EF 20%. 3. Acute on chronic systolic CHF. 4. Chronic LBBB  msec. 5. Remotely placed single chamber Medtronic ICD by Dr. Liz Maldonado, primary prevention. 6. Hypertension. 7. JUAN J atop CKD stage 3. Not on ACEI or ARB because of this. 8. UTI with Enterobacter cloacae. On antibiotic. 9. Anemia, Fe deficiency. 10. Chronic ASA and Eliquis anticoagulation. Plan: 1. I discussed options with him (and his wife). I think the next step after optimizing his CHF and treating his UTI is to upgrade him to a BIV-ICD to further treat his CHF. 2. Cardioversion can commence after amio loading. Will figure out bridging him to his procedure when the time comes. 3. A future Afib ablation is feasible, but when the threat of decompensated CHF is minimized. A comprehensive AFib ablation is what he'll need IF it comes to this, ablation will affect LA compliance which may promote CHF. All questions answered for the patient and wife. Discussed with Dr. Emilee Valencia. The Advanced Heart Failure team is on the case. [x]       High complexity decision making was performed in this patient at high risk for decompensation with multiple organ involvement. Reid Baumann is a 76 y.o. male with a history of recurrent atrial fibrillation and cardiomyopathy, CHF. He was cardioverted prior to admission, developed worsening CHF, treated for this. Other issues to complicate his therapy have included hypotension, JUAN J, and also a fever and GNR UTI. He feels better now further in his hospital stay.   No syncope, dizziness, or palpitations. His orthopnea is better. Denies chest, jaw, neck, shoulder, or arm discomfort on exertion. Currently on amiodarone load in anticipation of a cardioversion. No visible bleeding on ASA and Eliquis anticoagulation. Anemia noted. Past Medical History:  
Diagnosis Date  Degenerative disc disease, lumbar  Heart failure ([address]  High cholesterol  Hypertension  Paroxysmal atrial fibrillation ([facility] [address] 2019  Spinal stenosis Past Surgical History:  
Procedure Laterality Date  HX APPENDECTOMY  HX CORONARY ARTERY BYPASS GRAFT    
 triple  HX HERNIA REPAIR    
 HX IMPLANTABLE CARDIOVERTER DEFIBRILLATOR  PA CARDIOVERSION ELECTIVE ARRHYTHMIA EXTERNAL N/A 6/10/2019 EP CARDIOVERSION performed by Dottie Stoll [place] [address], [facility] CATH LAB Social History Tobacco Use  Smoking status: Former Smoker Last attempt to quit: 2010 Years since quittin.5  Smokeless tobacco: Never Used Substance Use Topics  Alcohol use: Yes Comment: rarely History reviewed. No pertinent family history. No Known Allergies Current Facility-Administered Medications Medication Dose Route Frequency  bumetanide (BUMEX) injection 2 mg  2 mg IntraVENous BID  
 evolocumab (REPATHA SURECLICK) pen injection 854 mg (Patient Supplied)  140 mg SubCUTAneous Q 14 DAYS  spironolactone (ALDACTONE) tablet 25 mg  25 mg Oral DAILY  milrinone (PRIMACOR) 20 MG/100 ML D5W infusion  0.125 mcg/kg/min IntraVENous CONTINUOUS  
 melatonin tablet 3 mg  3 mg Oral QHS PRN  
 amiodarone (CORDARONE) tablet 200 mg  200 mg Oral BID  
 apixaban (ELIQUIS) tablet 5 mg  5 mg Oral Q12H  aspirin delayed-release tablet 81 mg  81 mg Oral DAILY  carvedilol (COREG) tablet 12.5 mg  12.5 mg Oral BID WITH MEALS  sodium chloride (NS) flush 5-40 mL  5-40 mL IntraVENous Q8H  
  sodium chloride (NS) flush 5-40 mL  5-40 mL IntraVENous PRN  
 ondansetron (ZOFRAN) injection 4 mg  4 mg IntraVENous Q4H PRN  
 bisacodyl (DULCOLAX) tablet 5 mg  5 mg Oral DAILY PRN  
 acetaminophen (TYLENOL) tablet 650 mg  650 mg Oral Q4H PRN  
 morphine injection 2 mg  2 mg IntraVENous Q4H PRN Review of Symptoms:  See HPI as well. General: +fever, negative for chills, sweats, weakness, weight loss Eyes: negative for blurred vision, eye pain, loss of vision, diplopia Ear Nose and Throat: negative for rhinorrhea, pharyngitis, otalgia, tinnitus, speech or swallowing difficulties Respiratory: negative for cough, sputum production, wheezing, DOUGLAS, pleuritic pain  
Cardiology: negative for chest pain, palpitations, syncope Gastrointestinal: negative for abdominal pain, N/V, dysphagia, change in bowel habits, bleeding Genitourinary: negative for dysuria, hematuria, incontinence Muskuloskeletal : negative for arthralgia, myalgia Hematology: negative for easy bruising, bleeding, lymphadenopathy Dermatological: negative for rash, ulceration, mole change, new lesion Endocrine: negative for hot flashes or polydipsia Neurological: negative for headache, dizziness, confusion, focal weakness, paresthesia, memory loss, gait disturbance Psychological: negative for anxiety, depression, agitation Objective:  
  
Physical Exam: 
Temp (24hrs), Av.2 °F (36.8 °C), Min:97.8 °F (36.6 °C), Max:98.4 °F (36.9 °C) Patient Vitals for the past 8 hrs: 
 Pulse 19 1931 80  
19 1858 68  
19 1800 72  
19 1542 77 Patient Vitals for the past 8 hrs: 
 Resp  
19 1931 18  
19 1542 20 Patient Vitals for the past 8 hrs: 
 BP  
19 1931 119/67  
19 1858 97/55  
19 1800 92/48  
19 1542 138/70 Intake/Output Summary (Last 24 hours) at 2019 2205 Last data filed at 2019 1800 Gross per 24 hour Intake 840 ml Output 1300 ml Net -460 ml Nondiaphoretic, not in acute distress. Supple, no palpable thyromegaly. No scleral icterus, mucous membranes moist, conjuctivae pink, no xanthelasma. L chest ICD site OK. Unlabored, clear to auscultation bilaterally, symmetric air movement. Regular rate and rhythm, no murmur, pericardial rub, knock, or gallop. No JVD or peripheral edema. No carotid bruit. Palpable radial and DP/PT pulses bilaterally. Abdomen, soft, nontender, nondistended. No abdominal bruit or pulstatile masses. Extremities without cyanosis or clubbing. Muscle tone and bulk normal. 
Skin warm and dry. No rashes or ulcers. Neuro grossly nonfocal.  No tremor. Awake and appropriate. CARDIOGRAPHICS and STUDIES, I reviewed: 
 
Telemetry:  SR. 
 
ECG:  SR with LBBB 158 msec. Echo 6/2019 (JACQUE): · Left Ventricle: Moderately dilated left ventricle. Severe systolic dysfunction. Estimated left ventricular ejection fraction is 21 - 25%. Abnormal left ventricular wall motion. · Left Atrium: Severely dilated left atrium. Light spontaneous echo contrast seen within the left atrial appendage. Left atrial appendage velocity is reduced (less than 40 cm/sec). · Right Atrium: Moderately dilated right atrium. · Right Ventricle: Mildly reduced systolic function. · Mitral Valve: Mild mitral valve regurgitation. · Tricuspid Valve: Mild tricuspid valve regurgitation is present. Labs: 
No results for input(s): CPK, CKMB, CKNDX, TROIQ in the last 72 hours. No lab exists for component: CPKMB Lab Results Component Value Date/Time Cholesterol, total 64 06/01/2019 04:16 AM  
 HDL Cholesterol 39 06/01/2019 04:16 AM  
 LDL, calculated 14 06/01/2019 04:16 AM  
 Triglyceride 55 06/01/2019 04:16 AM  
 CHOL/HDL Ratio 1.6 06/01/2019 04:16 AM  
 
No results for input(s): INR, PTP, APTT in the last 72 hours. No lab exists for component: INREXT Recent Labs  
  06/17/19 
0345 06/16/19 
0510 06/16/19 
0507 06/15/19 5367 06/15/19 
2806  137  --  137  --   
K 3.8 4.2  --  3.6  --   
CL 97 98  --  99  --   
CO2 33* 32  --  31  --   
BUN 32* 31*  --  31*  --   
CREA 2.13* 1.93*  --  1.94*  --   
GLU 95 92  --  114*  --   
CA 9.1 9.0  --  8.7  --   
ALB 2.7* 2.7*  --  2.8*  --   
WBC 5.8  --  5.7  --  5.4 HGB 10.8*  --  11.6*  --  10.0* HCT 33.2*  --  35.6*  --  31.3*  
  --  267  --  322 Recent Labs  
  06/17/19 
0345 06/16/19 
0510 06/15/19 
0440 SGOT 14* 15 12* AP 89 90 94  
TP 6.7 6.5 6.8 ALB 2.7* 2.7* 2.8*  
GLOB 4.0 3.8 4.0 No components found for: [place] No results for input(s): PH, PCO2, PO2 in the last 72 hours. Madonna Joseph MD 
6/17/2019

## 2019-06-18 NOTE — PROGRESS NOTES
TRANSFER - IN REPORT: 
 
Verbal report received from Denisse Bell RN(name) on Martha Crelilo  being received from Cath lab(unit) for routine post - op Report consisted of patients Situation, Background, Assessment and  
Recommendations(SBAR). Information from the following report(s) SBAR, Recent Results and Cardiac Rhythm NSR was reviewed with the receiving nurse. Opportunity for questions and clarification was provided. Assessment completed upon patients arrival to unit and care assumed.

## 2019-06-19 ENCOUNTER — APPOINTMENT (OUTPATIENT)
Dept: GENERAL RADIOLOGY | Age: 69
DRG: 222 | End: 2019-06-19
Attending: NURSE PRACTITIONER
Payer: MEDICARE

## 2019-06-19 LAB
ALBUMIN SERPL-MCNC: 3.1 G/DL (ref 3.5–5)
ALBUMIN/GLOB SERPL: 0.8 {RATIO} (ref 1.1–2.2)
ALP SERPL-CCNC: 96 U/L (ref 45–117)
ALT SERPL-CCNC: 25 U/L (ref 12–78)
ANION GAP SERPL CALC-SCNC: 7 MMOL/L (ref 5–15)
AST SERPL-CCNC: 17 U/L (ref 15–37)
ATRIAL RATE: 79 BPM
BASOPHILS # BLD: 0.1 K/UL (ref 0–0.1)
BASOPHILS NFR BLD: 1 % (ref 0–1)
BILIRUB SERPL-MCNC: 0.7 MG/DL (ref 0.2–1)
BNP SERPL-MCNC: 5527 PG/ML
BUN SERPL-MCNC: 31 MG/DL (ref 6–20)
BUN/CREAT SERPL: 14 (ref 12–20)
CALCIUM SERPL-MCNC: 8.8 MG/DL (ref 8.5–10.1)
CALCULATED P AXIS, ECG09: 83 DEGREES
CALCULATED R AXIS, ECG10: 11 DEGREES
CALCULATED T AXIS, ECG11: 131 DEGREES
CHLORIDE SERPL-SCNC: 96 MMOL/L (ref 97–108)
CO2 SERPL-SCNC: 32 MMOL/L (ref 21–32)
CREAT SERPL-MCNC: 2.2 MG/DL (ref 0.7–1.3)
DIAGNOSIS, 93000: NORMAL
DIFFERENTIAL METHOD BLD: ABNORMAL
EOSINOPHIL # BLD: 0.2 K/UL (ref 0–0.4)
EOSINOPHIL NFR BLD: 3 % (ref 0–7)
ERYTHROCYTE [DISTWIDTH] IN BLOOD BY AUTOMATED COUNT: 13.7 % (ref 11.5–14.5)
GLOBULIN SER CALC-MCNC: 3.8 G/DL (ref 2–4)
GLUCOSE SERPL-MCNC: 117 MG/DL (ref 65–100)
HCT VFR BLD AUTO: 35.8 % (ref 36.6–50.3)
HGB BLD-MCNC: 11.6 G/DL (ref 12.1–17)
IMM GRANULOCYTES # BLD AUTO: 0 K/UL (ref 0–0.04)
IMM GRANULOCYTES NFR BLD AUTO: 0 % (ref 0–0.5)
LYMPHOCYTES # BLD: 0.9 K/UL (ref 0.8–3.5)
LYMPHOCYTES NFR BLD: 14 % (ref 12–49)
MAGNESIUM SERPL-MCNC: 2.4 MG/DL (ref 1.6–2.4)
MCH RBC QN AUTO: 30.4 PG (ref 26–34)
MCHC RBC AUTO-ENTMCNC: 32.4 G/DL (ref 30–36.5)
MCV RBC AUTO: 93.7 FL (ref 80–99)
MONOCYTES # BLD: 0.5 K/UL (ref 0–1)
MONOCYTES NFR BLD: 8 % (ref 5–13)
NEUTS SEG # BLD: 4.6 K/UL (ref 1.8–8)
NEUTS SEG NFR BLD: 74 % (ref 32–75)
NRBC # BLD: 0 K/UL (ref 0–0.01)
NRBC BLD-RTO: 0 PER 100 WBC
P-R INTERVAL, ECG05: 280 MS
PLATELET # BLD AUTO: 286 K/UL (ref 150–400)
PMV BLD AUTO: 9.2 FL (ref 8.9–12.9)
POTASSIUM SERPL-SCNC: 3.4 MMOL/L (ref 3.5–5.1)
PROT SERPL-MCNC: 6.9 G/DL (ref 6.4–8.2)
Q-T INTERVAL, ECG07: 426 MS
QRS DURATION, ECG06: 166 MS
QTC CALCULATION (BEZET), ECG08: 488 MS
RBC # BLD AUTO: 3.82 M/UL (ref 4.1–5.7)
SODIUM SERPL-SCNC: 135 MMOL/L (ref 136–145)
VENTRICULAR RATE, ECG03: 79 BPM
WBC # BLD AUTO: 6.1 K/UL (ref 4.1–11.1)

## 2019-06-19 PROCEDURE — 74011250637 HC RX REV CODE- 250/637: Performed by: INTERNAL MEDICINE

## 2019-06-19 PROCEDURE — 74011250636 HC RX REV CODE- 250/636: Performed by: INTERNAL MEDICINE

## 2019-06-19 PROCEDURE — 83735 ASSAY OF MAGNESIUM: CPT

## 2019-06-19 PROCEDURE — 85025 COMPLETE CBC W/AUTO DIFF WBC: CPT

## 2019-06-19 PROCEDURE — 99232 SBSQ HOSP IP/OBS MODERATE 35: CPT | Performed by: INTERNAL MEDICINE

## 2019-06-19 PROCEDURE — 36415 COLL VENOUS BLD VENIPUNCTURE: CPT

## 2019-06-19 PROCEDURE — 74011250637 HC RX REV CODE- 250/637: Performed by: NURSE PRACTITIONER

## 2019-06-19 PROCEDURE — 80053 COMPREHEN METABOLIC PANEL: CPT

## 2019-06-19 PROCEDURE — 65660000000 HC RM CCU STEPDOWN

## 2019-06-19 PROCEDURE — 93005 ELECTROCARDIOGRAM TRACING: CPT

## 2019-06-19 PROCEDURE — 65660000001 HC RM ICU INTERMED STEPDOWN

## 2019-06-19 PROCEDURE — 97116 GAIT TRAINING THERAPY: CPT

## 2019-06-19 PROCEDURE — 74011000250 HC RX REV CODE- 250: Performed by: NURSE PRACTITIONER

## 2019-06-19 PROCEDURE — 83880 ASSAY OF NATRIURETIC PEPTIDE: CPT

## 2019-06-19 PROCEDURE — 77010033678 HC OXYGEN DAILY

## 2019-06-19 PROCEDURE — 97530 THERAPEUTIC ACTIVITIES: CPT

## 2019-06-19 PROCEDURE — 71045 X-RAY EXAM CHEST 1 VIEW: CPT

## 2019-06-19 RX ORDER — POTASSIUM CHLORIDE 750 MG/1
40 TABLET, FILM COATED, EXTENDED RELEASE ORAL
Status: COMPLETED | OUTPATIENT
Start: 2019-06-19 | End: 2019-06-19

## 2019-06-19 RX ORDER — CARVEDILOL 6.25 MG/1
6.25 TABLET ORAL 2 TIMES DAILY WITH MEALS
Status: DISCONTINUED | OUTPATIENT
Start: 2019-06-19 | End: 2019-06-26 | Stop reason: HOSPADM

## 2019-06-19 RX ADMIN — APIXABAN 5 MG: 5 TABLET, FILM COATED ORAL at 21:04

## 2019-06-19 RX ADMIN — POTASSIUM CHLORIDE 40 MEQ: 750 TABLET, FILM COATED, EXTENDED RELEASE ORAL at 08:01

## 2019-06-19 RX ADMIN — Medication 10 ML: at 21:04

## 2019-06-19 RX ADMIN — APIXABAN 5 MG: 5 TABLET, FILM COATED ORAL at 08:01

## 2019-06-19 RX ADMIN — AMIODARONE HYDROCHLORIDE 200 MG: 200 TABLET ORAL at 08:00

## 2019-06-19 RX ADMIN — ACETAMINOPHEN 650 MG: 325 TABLET ORAL at 17:01

## 2019-06-19 RX ADMIN — AMIODARONE HYDROCHLORIDE 200 MG: 200 TABLET ORAL at 17:01

## 2019-06-19 RX ADMIN — Medication 10 ML: at 06:24

## 2019-06-19 RX ADMIN — MILRINONE LACTATE IN DEXTROSE 0.2 MCG/KG/MIN: 200 INJECTION, SOLUTION INTRAVENOUS at 21:04

## 2019-06-19 RX ADMIN — BUMETANIDE 0.5 MG/HR: 0.25 INJECTION INTRAMUSCULAR; INTRAVENOUS at 07:39

## 2019-06-19 RX ADMIN — Medication 10 ML: at 13:52

## 2019-06-19 RX ADMIN — ASPIRIN 81 MG: 81 TABLET ORAL at 08:00

## 2019-06-19 RX ADMIN — MILRINONE LACTATE IN DEXTROSE 0.2 MCG/KG/MIN: 200 INJECTION, SOLUTION INTRAVENOUS at 06:50

## 2019-06-19 RX ADMIN — SPIRONOLACTONE 25 MG: 25 TABLET ORAL at 08:00

## 2019-06-19 RX ADMIN — CARVEDILOL 12.5 MG: 12.5 TABLET, FILM COATED ORAL at 08:00

## 2019-06-19 RX ADMIN — CARVEDILOL 6.25 MG: 6.25 TABLET, FILM COATED ORAL at 16:23

## 2019-06-19 NOTE — PROGRESS NOTES
Problem: Mobility Impaired (Adult and Pediatric) Goal: *Acute Goals and Plan of Care (Insert Text) Description Physical Therapy Goals Revised 6/14/19 1. Patient will ambulate with modified independence for 400 feet with the least restrictive device within 7 day(s). Initiated 6/7/2019 1. Patient will ambulate with modified independence for 400 feet with the least restrictive device within 7 day(s). Outcome: Progressing Towards Goal 
 PHYSICAL THERAPY TREATMENT Patient: Billy Ramírez (77 y.o. male) Date: 6/19/2019 Diagnosis: Acute on chronic systolic CHF (congestive heart failure) ([facility]) [I50.23] Acute on chronic systolic CHF (congestive heart failure) ([facility] [address] Procedure(s) (LRB): 
EP CARDIOVERSION (N/A) RIGHT HEART CATH (N/A) 1 Day Post-Op Precautions:   
Chart, physical therapy assessment, plan of care and goals were reviewed. ASSESSMENT: 
Pt received supine in bed and cleared by RN. POD #1 from right heart cath and 3rd cardioversion. Reports last night was pretty bad as far as breathing goes but eager to get up and walk in the dias. Overall pt remains SBA-CGA for all bed mobility, transfers and gait x 300ft with SPC. Steady gait speed, intact balance and no standing rest breaks required. Began education on pacemaker precautions as plan is for one tomorrow or Friday per pt. Completed multiple sit to stands without UE use. Will follow up following pacemaker placement. Noted O2 saturations on RA dropping to 93% with activity. Progression toward goals: 
?    Improving appropriately and progressing toward goals ? Improving slowly and progressing toward goals ? Not making progress toward goals and plan of care will be adjusted PLAN: 
Patient continues to benefit from skilled intervention to address the above impairments. Continue treatment per established plan of care. Discharge Recommendations:  Outpatient Cardiac Rehab Further Equipment Recommendations for Discharge:  None SUBJECTIVE:  
Patient stated ? I was about to bother the nurse and have her walk with me.? OBJECTIVE DATA SUMMARY:  
Critical Behavior: 
Neurologic State: Alert Orientation Level: Oriented X4 Cognition: Appropriate decision making, Appropriate safety awareness, Appropriate for age attention/concentration, Follows commands Functional Mobility Training: 
Bed Mobility: 
  
  
  
  
  
  
Transfers: 
Sit to Stand: Stand-by assistance Stand to Sit: Stand-by assistance Balance: 
Sitting: Intact Standing: Intact; With support Standing - Static: Good Ambulation/Gait Training: 
Distance (ft): 300 Feet (ft) Assistive Device: Gait belt;Cane, straight Ambulation - Level of Assistance: Stand-by assistance Activity Tolerance:  
Good Please refer to the flowsheet for vital signs taken during this treatment. After treatment:  
?    Patient left in no apparent distress sitting up in chair ? Patient left in no apparent distress in bed 
? Call bell left within reach ? Nursing notified ? Caregiver present ? Bed alarm activated COMMUNICATION/COLLABORATION:  
The patient?s plan of care was discussed with: Registered Nurse Wesly Rojas, PT Time Calculation: 25 mins

## 2019-06-19 NOTE — PROGRESS NOTES
Hospitalist Progress Note Jerry Piedra MD 
Please call  and page for questions. Call physician on-call through the  7pm-7am 
 
Daily Progress Note: 6/19/2019 Primary care Jose M Briggs MD 
 
Date of admission: 5/31/2019  7:03 PM 
 
Admission summery and hospital course: 
79-year-old man with past medical history significant for chronic systolic congestive heart failure, dyslipidemia, hypertension, atrial fibrillation status post cardioversion, was in his usual state of health until the day of presentation at the emergency room when the patient developed worsening of his shortness of breath.  The inpatient underwent cardioversion early this morning by his cardiologist. 
Emmette Pea started on 06/05. 
  
Subjective:  
 
Patient is seen and examined at bedside. Feels ok now. He was sob last evening and xanax helped. he had similar episode of sob in the past after cardioversion. Assessment/Plan:  
Acute-on-chronic systolic congestive heart failure NYHA 2-3 - improving JACQUE on 05/31 with EF of 21%-25%.      
Continue coreg CTA of the chest is negative for pulmonary embolism.  Appreciated Dr Vargas Figueroa input. AHF team on board Patient back on milrinone and bumex drip RHC done 6/18 - elevated pressures C/w po aldactone Saturating on 2lNC, try to wean off  
  
Atrial fibrillation, status post cardioversion 6/18   
Aflutter - reverted to afib 
- he had cardioversions in the past 
- c/w amiodarone and Eliquis for anticoagulation.  
 
LBBB - need upgrade to BiV ICD/pacer perhaps later this week per cardiology 
  
Hypertension. stable . C/w coreg Acute kidney injury on CKD   
Creatinine worsened -  Most likely as a result due to diuresis  
- Renal US: 06/05 was normal.  
- avoid nephrotoxins - will monitor Hypokalemia- replaced Anemia-iron deficient - hb stable UTI:  
Afebrile. Completed Rocephin.  
Ucx: Enterobacter Cloacae Urinary retention- resolved, off neal Insomnia: melatonin. 
  
VTE prophylaxis: On Eliquis.  
Code status: Full Discussed plan of care with Patient/Family and Nurse.  
Pre-admission lived at home. Discharge planning: TBD. Home when ready, outpt cardiac rehab Review of Systems:  
 
Review of Systems: 
Symptom  Y/N  Comments   Symptom  Y/N  Comments Fever/Chills   n   Chest Pain  n   
Poor Appetite  n    Edema  n    
Cough  n   Abdominal Pain  n    
Sputum  n   Joint Pain SOB/DOUGLAS  y Improved   Pruritis/Rash Nausea/vomit  n   Tolerating PT/OT Diarrhea     Tolerating Diet  y Constipation     Other Could not obtain due to:    
 
 
Objective:  
Physical Exam:  
 
Visit Vitals BP 98/63 (BP 1 Location: Right arm, BP Patient Position: At rest) Pulse 74 Temp 97.6 °F (36.4 °C) Resp 25 Wt 94.3 kg (207 lb 14.3 oz) SpO2 95% BMI 26.69 kg/m² O2 Flow Rate (L/min): 2 l/min O2 Device: Nasal cannula Temp (24hrs), Av.4 °F (36.9 °C), Min:97.6 °F (36.4 °C), Max:99.4 °F (37.4 °C) 
   07 -  1900 In: 1526.8 [P.O.:400; I.V.:1126.8] Out: 800 [Urine:800]   1901 -  0700 In: 910 [P.O.:910] Out: 2600 [Urine:2600] General:  Alert, cooperative, no distress, appears stated age. Lungs:     clear eldon bases Heart:  Regular rate and rhythm, S1, S2 normal, no murmur.   
Abdomen:   Soft, non-tender. Bowel sounds normal.   
Extremities: Extremities normal, atraumatic, no cyanosis. Edema at LEs. Skin: Skin color, texture, turgor normal. No rashes or lesions Neurologic: Grossly normal.   
  
Data Review:  
   
Recent Days: 
Recent Labs  
  19 
0410 19 
0212 19 
0345 WBC 6.1 5.2 5.8 HGB 11.6* 10.9* 10.8* HCT 35.8* 33.8* 33.2*  
 287 296 Recent Labs  
  19 
0410 19 
0211 19 
0345 * 135* 136  
K 3.4* 3.9 3.8 CL 96* 97 97 CO2 32 31 33* * 98 95 BUN 31* 34* 32* CREA 2.20* 2.21* 2.13* CA 8.8 9.1 9.1 MG 2.4 1.0* 2.1 ALB 3.1* 2.8* 2.7* SGOT 17 16 14* ALT 25 23 21 No results for input(s): PH, PCO2, PO2, HCO3, FIO2 in the last 72 hours. 24 Hour Results: 
Recent Results (from the past 24 hour(s)) NT-PRO BNP Collection Time: 06/19/19  4:10 AM  
Result Value Ref Range NT pro-BNP 5,527 (H) <125 PG/ML  
MAGNESIUM Collection Time: 06/19/19  4:10 AM  
Result Value Ref Range Magnesium 2.4 1.6 - 2.4 mg/dL METABOLIC PANEL, COMPREHENSIVE Collection Time: 06/19/19  4:10 AM  
Result Value Ref Range Sodium 135 (L) 136 - 145 mmol/L Potassium 3.4 (L) 3.5 - 5.1 mmol/L Chloride 96 (L) 97 - 108 mmol/L  
 CO2 32 21 - 32 mmol/L Anion gap 7 5 - 15 mmol/L Glucose 117 (H) 65 - 100 mg/dL BUN 31 (H) 6 - 20 MG/DL Creatinine 2.20 (H) 0.70 - 1.30 MG/DL  
 BUN/Creatinine ratio 14 12 - 20 GFR est AA 36 (L) >60 ml/min/1.73m2 GFR est non-AA 30 (L) >60 ml/min/1.73m2 Calcium 8.8 8.5 - 10.1 MG/DL Bilirubin, total 0.7 0.2 - 1.0 MG/DL  
 ALT (SGPT) 25 12 - 78 U/L  
 AST (SGOT) 17 15 - 37 U/L Alk. phosphatase 96 45 - 117 U/L Protein, total 6.9 6.4 - 8.2 g/dL Albumin 3.1 (L) 3.5 - 5.0 g/dL Globulin 3.8 2.0 - 4.0 g/dL A-G Ratio 0.8 (L) 1.1 - 2.2    
CBC WITH AUTOMATED DIFF Collection Time: 06/19/19  4:10 AM  
Result Value Ref Range WBC 6.1 4.1 - 11.1 K/uL  
 RBC 3.82 (L) 4.10 - 5.70 M/uL  
 HGB 11.6 (L) 12.1 - 17.0 g/dL HCT 35.8 (L) 36.6 - 50.3 % MCV 93.7 80.0 - 99.0 FL  
 MCH 30.4 26.0 - 34.0 PG  
 MCHC 32.4 30.0 - 36.5 g/dL  
 RDW 13.7 11.5 - 14.5 % PLATELET 515 683 - 897 K/uL MPV 9.2 8.9 - 12.9 FL  
 NRBC 0.0 0  WBC ABSOLUTE NRBC 0.00 0.00 - 0.01 K/uL NEUTROPHILS 74 32 - 75 % LYMPHOCYTES 14 12 - 49 % MONOCYTES 8 5 - 13 % EOSINOPHILS 3 0 - 7 % BASOPHILS 1 0 - 1 % IMMATURE GRANULOCYTES 0 0.0 - 0.5 % ABS. NEUTROPHILS 4.6 1.8 - 8.0 K/UL  
 ABS. LYMPHOCYTES 0.9 0.8 - 3.5 K/UL ABS. MONOCYTES 0.5 0.0 - 1.0 K/UL  
 ABS. EOSINOPHILS 0.2 0.0 - 0.4 K/UL  
 ABS. BASOPHILS 0.1 0.0 - 0.1 K/UL  
 ABS. IMM. GRANS. 0.0 0.00 - 0.04 K/UL  
 DF AUTOMATED    
EKG, 12 LEAD, INITIAL Collection Time: 06/19/19  7:00 AM  
Result Value Ref Range Ventricular Rate 79 BPM  
 Atrial Rate 79 BPM  
 P-R Interval 280 ms QRS Duration 166 ms  
 Q-T Interval 426 ms  
 QTC Calculation (Bezet) 488 ms Calculated P Axis 83 degrees Calculated R Axis 11 degrees Calculated T Axis 131 degrees Diagnosis Sinus rhythm with 1st degree AV block with occasional premature ventricular  
complexes Left bundle branch block When compared with ECG of 18-JUN-2019 11:07, 
premature ventricular complexes are now present 
premature atrial complexes are no longer present Nonspecific T wave abnormality now evident in Anterior leads QT has shortened Confirmed by Leonard Pinzon M.D., [facility] ([id number]) on 6/19/2019 9:07:53 AM 
  
 
 
Problem List: 
Problem List as of 6/19/2019 Date Reviewed: 6/10/2019 Codes Class Noted - Resolved * (Principal) Acute on chronic systolic CHF (congestive heart failure) (HCC) ICD-10-CM: V64.68 ICD-9-CM: 428.23, 428.0  5/31/2019 - Present Paroxysmal atrial fibrillation (HCC) ICD-10-CM: I48.0 ICD-9-CM: 427.31  4/2/2019 - Present Medications reviewed Current Facility-Administered Medications Medication Dose Route Frequency  carvedilol (COREG) tablet 6.25 mg  6.25 mg Oral BID WITH MEALS  
 bumetanide (BUMEX) 0.25 mg/mL infusion  0.5 mg/hr IntraVENous CONTINUOUS  
 milrinone (PRIMACOR) 20 MG/100 ML D5W infusion  0.2 mcg/kg/min IntraVENous CONTINUOUS  
 evolocumab (REPATHA SURECLICK) pen injection 296 mg (Patient Supplied)  140 mg SubCUTAneous Q 14 DAYS  spironolactone (ALDACTONE) tablet 25 mg  25 mg Oral DAILY  melatonin tablet 3 mg  3 mg Oral QHS PRN  
 amiodarone (CORDARONE) tablet 200 mg  200 mg Oral BID  
  apixaban (ELIQUIS) tablet 5 mg  5 mg Oral Q12H  aspirin delayed-release tablet 81 mg  81 mg Oral DAILY  sodium chloride (NS) flush 5-40 mL  5-40 mL IntraVENous Q8H  
 sodium chloride (NS) flush 5-40 mL  5-40 mL IntraVENous PRN  
 ondansetron (ZOFRAN) injection 4 mg  4 mg IntraVENous Q4H PRN  
 bisacodyl (DULCOLAX) tablet 5 mg  5 mg Oral DAILY PRN  
 acetaminophen (TYLENOL) tablet 650 mg  650 mg Oral Q4H PRN  
 morphine injection 2 mg  2 mg IntraVENous Q4H PRN Care Plan discussed with: Patient/Family and Nurse Total time spent with patient: 30 minutes.  
 
Jett Millan MD

## 2019-06-19 NOTE — PROGRESS NOTES
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 JESÚS Plan: 1. Home oxygen if needed 2. Outpaitient cardiac rehab vs [address] [place] [address] for CHF 3. Follow up with Cardiology 4. Follow up with Urology CM updated in rounds today that patient may need a pace maker upgrade and ablation. Patient currently on milrinone and bumex. CM to monitor.   
 
Yolanda Kruse MS

## 2019-06-19 NOTE — PROGRESS NOTES
Problem: Heart Failure: Day 17 Goal: Off Pathway (Use only if patient is Off Pathway) Outcome: Progressing Towards Goal 
Note:  
Diagnostic Test/ Procedures - S/p successful cardioversion and Right heart cath. Problem: Falls - Risk of 
Goal: *Absence of Falls Description Document Francia Swift Fall Risk and appropriate interventions in the flowsheet. Outcome: Progressing Towards Goal 
Note:  
Fall Risk Interventions: 
Mobility Interventions: Communicate number of staff needed for ambulation/transfer, PT Consult for mobility concerns, PT Consult for assist device competence, Strengthening exercises (ROM-active/passive), Utilize walker, cane, or other assistive device Medication Interventions: Assess postural VS orthostatic hypotension, Evaluate medications/consider consulting pharmacy, Patient to call before getting OOB, Teach patient to arise slowly Elimination Interventions: Call light in reach, Patient to call for help with toileting needs, Stay With Me (per policy), Toileting schedule/hourly rounds, Toilet paper/wipes in reach, Urinal in reach History of Falls Interventions: Door open when patient unattended, Evaluate medications/consider consulting pharmacy Problem: Urinary Tract Infection - Adult Goal: *Absence of infection signs and symptoms Outcome: Progressing Towards Goal 
  
Problem: Cath Lab Procedures: Post-Cath Day of Procedure (Initiate SCIP Measures for Post-Op Care) Goal: *Procedure site is without bleeding and signs of infection six hours post sheath removal 
Outcome: Progressing Towards Goal 
Goal: *Hemodynamically stable Outcome: Progressing Towards Goal 
Goal: *Optimal pain control at patient's stated goal 
Outcome: Progressing Towards Goal

## 2019-06-19 NOTE — PROGRESS NOTES
1930: Bedside shift change report given to Sonny Rascon, RN (oncoming nurse) by Jose Manuel Garza, KATRIN (offgoing nurse). Report included the following information SBAR, Kardex, Intake/Output, MAR, Recent Results and Cardiac Rhythm NSR w/1 degree AVB with BBB. Problem: Heart Failure: Day 5 Goal: Activity/Safety Outcome: Progressing Towards Goal 
Note:  
Up ad sheron Goal: Diagnostic Test/Procedures Outcome: Progressing Towards Goal 
Note:  
Chest xray today. Plan is for BiV and potential ablation later this week. Goal: Medications Outcome: Progressing Towards Goal 
Note:  
Pt on bumex gtt and milrinone gtt. Receiving coreg, amiodarone, and spironolactone. Goal: Respiratory Outcome: Progressing Towards Goal 
Note:  
Pt on 2 L NC. Felt a bit SOB yesterday after RHC/ablation, however pt states this has improved. Pt still has crackles in bases and CXR ordered. Problem: Cath Lab Procedures: Post-Cath Day of Procedure (Initiate SCIP Measures for Post-Op Care) Goal: *Procedure site is without bleeding and signs of infection six hours post sheath removal 
Outcome: Progressing Towards Goal

## 2019-06-19 NOTE — PROGRESS NOTES
[address] [facility] [place] 904 [facility] [place] in [place], [place] Inpatient Progress Note Patient name: Aaron Martinez Patient : 1950 Patient MRN: [id number] Attending MD: Audelia Neumann MD 
Date of service: 19 CHIEF COMPLAINT: 
Acute on chronic systolic heart failure Recent Events: RHC shows elevated filling pressures and adequate CI Dyspneic following DCCV  
  
Plan:  
Continue milrinone 0.2 mcg/kg/min and bumex gtt 0.5 mg/hr until CRT upgrade Continue spironolactone 25mg daily Continue amiodarone for rate control Continue apixaban for [facility] Intolerant to ACE/ARB/ARNi due to renal dysfunction and while aggressively diuresed Ambulate daily PT/OT 
  
IMPRESSION: 
Fatigue Shortness of breath Volume overload Pulmonary edema Acute on chronic systolic heart failure Stage C, NYHA class IIIA symptoms Coronary artery disease S/p arrest VFib and subsequent CABG  Sternal wound infection requiring sternectomy Ischemic cardiomyopathy, LVEF 20% 10/18 Atrial fibrillation s/p failed DCCV 6/10/19, s/p successful DCCV 19 Chronic anticoagulation with eliquis S/p ICD 3/21/11 LBBB,  ms Acute on CKD, stage 3 
UTI Urinary retension Anemia, iron deficiency H/o DVT Migraines JACQUE on 19 showed thrombus H/o tobacco abuse Nighttime desaturations, likely JOSE Body mass index is 27.99 kg/m². CARDIAC IMAGING: 
Echo (19) LVEF 21-25%, severely dilated LA, moderately dilated RA 
EKG (19) atrial flutter with occasional PVCs, LBBB QRS 158ms [facility] not in epic 
  
HEMODYNAMICS: 
RHC not done CPEST not done 6MW not done 
  
OTHER IMAGING: 
CT chest (19) 1. No evidence of pulmonary embolus. 2. Interstitial edema and small bilateral pleural effusions.  3. Mild mediastinal adenopathy. 
  
INTERVAL HISTORY: 
Briefly, this is a 77 y/o pleasant white male with h/o HTN, HL, likey JOSE, CAD s/p cardiac arrest VFib s/p CABG (2011) c/b sternal would infection and sternotomy, ischemic cardiomyopathy LVEF 15-20%, s/p ICD and with LBBB. Patient admitted with acute on chronic systolic heart failure with massive volume overload > 20 lbs, in the setting of atrial fibrillation s/p failed DCCV and new UTI now treated with IV antibiotics. Abdomen: Soft, no mass or tenderness. No organomegaly or hernia. Bowel sounds present. Genitourinary and rectal: deferred Extremities: No cyanosis, clubbing, or edema. Neurologic: No focal sensory or motor deficits are noted. Grossly intact. Psychiatric: Awake, alert an doriented x 3. Appropriate mood and affect. Skin: Warm, dry and well perfused. No lesions, nodules or rashes are noted. REVIEW OF SYSTEMS: 
General: Denies fever, night sweats. Ear, nose and throat: Denies difficulty hearing, sinus problems, runny nose, post-nasal drip, ringing in ears, mouth sores, loose teeth, ear pain, nosebleeds, sore throate, facial pain or numbess Cardiovascular: see above in the interval history Respiratory: Denies cough, wheezing, sputum production, hemoptysis. Gastrointestinal: Denies heartburn, constipation, intolerance to certain foods, diarrhea, abdominal pain, nausea, vomiting, difficulty swallowing, blood in stool Kidney and bladder: Denies painful urination, frequent urination, urgency, prostate problems and impotence Musculoskeletal: Denies joint pain, muscle weakness Skin and hair: Denies change in existing skin lesions, hair loss or increase, breast changes PAST MEDICAL HISTORY: 
Past Medical History:  
Diagnosis Date  Degenerative disc disease, lumbar  Heart failure ([address]  High cholesterol  Hypertension  Paroxysmal atrial fibrillation ([address] 4/2/2019  Spinal stenosis PAST SURGICAL HISTORY: 
Past Surgical History:  
Procedure Laterality Date  HX APPENDECTOMY  HX CORONARY ARTERY BYPASS GRAFT    
 triple  HX HERNIA REPAIR    
 HX IMPLANTABLE CARDIOVERTER DEFIBRILLATOR  [place] CARDIOVERSION ELECTIVE ARRHYTHMIA EXTERNAL N/A 6/10/2019 EP CARDIOVERSION performed by Wilda Pavon MD at [address] [facility]/s Dr MARLEE PERKINS  [place] CARDIOVERSION ELECTIVE ARRHYTHMIA EXTERNAL N/A 2019 EP CARDIOVERSION performed by Kenney Marshall MD at [address], [facility]/s Dr MARLEE PERKINS FAMILY HISTORY: 
History reviewed. No pertinent family history. SOCIAL HISTORY: 
Social History Socioeconomic History  Marital status:  Spouse name: Not on file  Number of children: Not on file  Years of education: Not on file  Highest education level: Not on file Tobacco Use  Smoking status: Former Smoker Last attempt to quit: 2010 Years since quittin.5  Smokeless tobacco: Never Used Substance and Sexual Activity  Alcohol use: Yes Comment: rarely  Drug use: Never LABORATORY RESULTS: 
  
Labs Latest Ref Rng & Units 2019 2019 2019 2019 6/15/2019 2019 2019 WBC 4.1 - 11.1 K/uL 6.1 5.2 5.8 5.7 5.4 6.2 -  
RBC 4.10 - 5.70 M/uL 3.82(L) 3.63(L) 3.59(L) 3.83(L) 3.36(L) 3.61(L) - Hemoglobin 12.1 - 17.0 g/dL 11. 6(L) 10. 9(L) 10. 8(L) 11. 6(L) 10. 0(L) 11. 0(L) - Hematocrit 36.6 - 50.3 % 35. 8(L) 33. 8(L) 33. 2(L) 35. 6(L) 31. 3(L) 34. 3(L) -  
MCV 80.0 - 99.0 FL 93.7 93.1 92.5 93.0 93.2 95.0 - Platelets 828 - 683 K/uL 286 287 296 267 322 293 - Lymphocytes 12 - 49 % 14 - - - - - - Monocytes 5 - 13 % 8 - - - - - - Eosinophils 0 - 7 % 3 - - - - - - Basophils 0 - 1 % 1 - - - - - - Bands 0 - 6 % - - - - - - - Albumin 3.5 - 5.0 g/dL 3. 1(L) 2. 8(L) 2. 7(L) 2. 7(L) 2. 8(L) - 2. 5(L) Calcium 8.5 - 10.1 MG/DL 8.8 9.1 9.1 9.0 8.7 9.0 8.6 SGOT 15 - 37 U/L 17 16 14(L) 15 12(L) - 13(L) Glucose 65 - 100 mg/dL 117(H) 98 95 92 114(H) 106(H) 94 BUN 6 - 20 MG/DL 31(H) 34(H) 32(H) 31(H) 31(H) 30(H) 31(H) Creatinine 0.70 - 1.30 MG/DL 2.20(H) 2.21(H) 2.13(H) 1.93(H) 1.94(H) 1.76(H) 1.74(H) Sodium 136 - 145 mmol/L 135(L) 135(L) 136 137 137 137 137 Potassium 3.5 - 5.1 mmol/L 3.4(L) 3.9 3.8 4.2 3.6 4.4 4.1 TSH 0.36 - 3.74 uIU/mL - - - - - - - Some recent data might be hidden Lab Results Component Value Date/Time TSH 2.45 06/01/2019 04:16 AM  
 
 
CURRENT MEDICATIONS: 
 
Current Facility-Administered Medications:  
  carvedilol (COREG) tablet 6.25 mg, 6.25 mg, Oral, BID WITH MEALS, Sondra Herrerayl Merlos T NP, 6.25 mg at 06/19/19 1623   bumetanide (BUMEX) 0.25 mg/mL infusion, 0.5 mg/hr, IntraVENous, CONTINUOUS, Basilia Herreraosta T, NP, Last Rate: 2 mL/hr at 06/19/19 0739, 0.5 mg/hr at 06/19/19 0739 
  milrinone (PRIMACOR) 20 MG/100 ML D5W infusion, 0.2 mcg/kg/min, IntraVENous, CONTINUOUS, Kendrick Quintero MD, Last Rate: 5.8 mL/hr at 06/19/19 0650, 0.2 mcg/kg/min at 06/19/19 5314   evolocumab (REPATHA SURECLICK) pen injection 852 mg (Patient Supplied), 140 mg, SubCUTAneous, Q 14 DAYS, Molina Urrutia MD, 140 mg at 06/14/19 1735   spironolactone (ALDACTONE) tablet 25 mg, 25 mg, Oral, DAILY, Kendrick Quintero MD, 25 mg at 06/19/19 0800 
  melatonin tablet 3 mg, 3 mg, Oral, QHS PRN, Berta Sanchez MD, 3 mg at 06/09/19 2125 
  amiodarone (CORDARONE) tablet 200 mg, 200 mg, Oral, BID, Kendrick Quintero MD, 200 mg at 06/19/19 0800 
  apixaban (ELIQUIS) tablet 5 mg, 5 mg, Oral, Q12H, Lizet Beal MD, 5 mg at 06/19/19 4364   aspirin delayed-release tablet 81 mg, 81 mg, Oral, DAILY, Lizet Beal MD, 81 mg at 06/19/19 0800 
  sodium chloride (NS) flush 5-40 mL, 5-40 mL, IntraVENous, Q8H, Lizet Beal MD, 10 mL at 06/19/19 1352   sodium chloride (NS) flush 5-40 mL, 5-40 mL, IntraVENous, PRN, Lizet Beal MD, 10 mL at 06/03/19 6027   ondansetron (ZOFRAN) injection 4 mg, 4 mg, IntraVENous, Q4H PRN, Lizet Beal MD 
  bisacodyl (DULCOLAX) tablet 5 mg, 5 mg, Oral, DAILY PRN, Cuco Mane MD 
   acetaminophen (TYLENOL) tablet 650 mg, 650 mg, Oral, Q4H PRN, Lizet Beal MD, 650 mg at 06/05/19 1642   morphine injection 2 mg, 2 mg, IntraVENous, Q4H PRN, Lizet Beal MD 
 
 
Thank you for allowing me to participate in this patient's care. Lorene Lisa, AGACNP-BC [facility] [address], [address] Phone: [phone] Fax: [phone] Patient seen and examined. Data and note reviewed. I have discussed and agree with the plans as noted. 76year old male with a history of CAD, ICM who was admitted with acute on chronic systolic heart failure. RHC revealed low CI and elevated filling pressures - IV milrinone resumed. Await BiVICD implant. Monitor renal function closely. Thank you for allowing us to participate in your patient's care. Mounika Kamara MD, [facility] Chief of Cardiology, [facility] Medical Director [facility] [address], [address] [place] [address] [address] Office [phone] Fax [phone]

## 2019-06-19 NOTE — PROGRESS NOTES
S Cardiology Progress Note                                        WSNVA Date: 5/31/2019 
  
  
Pt reports repeat severe dyspnea after cardioversion which lasted several hours-same as he has had before. Better now and still in NSR with LBBB Back on bumex drip and milrinone with loss of 4 pounds since yesterday 
  
Assessment/Plan: # Acute on chronic systolic HF -EF 26% 
- improved initially  with diuretics, but creat increased and BP dropped; changed dobutamine to milrinone last monday as BP had increased;  improved diuresis with IV bumex but due to rising Cr, switched back to IV bolus. -RHC to be done today; off milrinone since earlier this am 
  
# AF -reverted to afib, cont eliquis and amiodarone; in aflutter, CV last monday with recurrent flutter; with repeat CV yesterday 
- Consider ablation in future once he has been optimized.  
  
# LBBB - Will plan upgrade to BiV ICD/pacer perhaps later this week 
  
# Acute on chronic kidney injury-renal US normal; stabilized, though higher than his baseline; UO good 
  
# fever- urine culture positive with gram-rods; on rocephin-last day wednesday; repeat culture negative; afebrile for several days 
  #urinary retention-urology suggested home with neal but neal removed; urinating fine 
  
# Anemia-iron deficient; venofer suggested 
  
# Hypomagnesiemia-replaced Exam: 
Blood pressure 114/73, pulse 87, temperature 98.6 °F (37 °C), resp. rate 20, weight 94.3 kg (207 lb 14.3 oz), SpO2 96 %. Lungs clear Cor reg with S3 
Edema 1+ Labs: 
K 3.4 Cr 2.2 BNP up slightly EKG NSR with first degree AV block and LBBB.  
 
Ema Byrne MD

## 2019-06-20 LAB
ANION GAP SERPL CALC-SCNC: 5 MMOL/L (ref 5–15)
APPEARANCE UR: CLEAR
BACTERIA URNS QL MICRO: NEGATIVE /HPF
BASOPHILS # BLD: 0 K/UL (ref 0–0.1)
BASOPHILS NFR BLD: 0 % (ref 0–1)
BILIRUB UR QL: NEGATIVE
BNP SERPL-MCNC: 3118 PG/ML
BUN SERPL-MCNC: 32 MG/DL (ref 6–20)
BUN/CREAT SERPL: 15 (ref 12–20)
CALCIUM SERPL-MCNC: 8.9 MG/DL (ref 8.5–10.1)
CHLORIDE SERPL-SCNC: 96 MMOL/L (ref 97–108)
CO2 SERPL-SCNC: 34 MMOL/L (ref 21–32)
COLOR UR: ABNORMAL
CREAT SERPL-MCNC: 2.1 MG/DL (ref 0.7–1.3)
DIFFERENTIAL METHOD BLD: ABNORMAL
EOSINOPHIL # BLD: 0.1 K/UL (ref 0–0.4)
EOSINOPHIL NFR BLD: 1 % (ref 0–7)
EPITH CASTS URNS QL MICRO: ABNORMAL /LPF
ERYTHROCYTE [DISTWIDTH] IN BLOOD BY AUTOMATED COUNT: 13.7 % (ref 11.5–14.5)
GLUCOSE SERPL-MCNC: 96 MG/DL (ref 65–100)
GLUCOSE UR STRIP.AUTO-MCNC: NEGATIVE MG/DL
HCT VFR BLD AUTO: 34.8 % (ref 36.6–50.3)
HGB BLD-MCNC: 11.3 G/DL (ref 12.1–17)
HGB UR QL STRIP: ABNORMAL
HYALINE CASTS URNS QL MICRO: ABNORMAL /LPF (ref 0–5)
IMM GRANULOCYTES # BLD AUTO: 0 K/UL
IMM GRANULOCYTES NFR BLD AUTO: 0 %
KETONES UR QL STRIP.AUTO: NEGATIVE MG/DL
LEUKOCYTE ESTERASE UR QL STRIP.AUTO: NEGATIVE
LYMPHOCYTES # BLD: 0.7 K/UL (ref 0.8–3.5)
LYMPHOCYTES NFR BLD: 14 % (ref 12–49)
MAGNESIUM SERPL-MCNC: 2.2 MG/DL (ref 1.6–2.4)
MCH RBC QN AUTO: 30.1 PG (ref 26–34)
MCHC RBC AUTO-ENTMCNC: 32.5 G/DL (ref 30–36.5)
MCV RBC AUTO: 92.6 FL (ref 80–99)
MONOCYTES # BLD: 0.2 K/UL (ref 0–1)
MONOCYTES NFR BLD: 4 % (ref 5–13)
NEUTS SEG # BLD: 4.3 K/UL (ref 1.8–8)
NEUTS SEG NFR BLD: 81 % (ref 32–75)
NITRITE UR QL STRIP.AUTO: NEGATIVE
NRBC # BLD: 0 K/UL (ref 0–0.01)
NRBC BLD-RTO: 0 PER 100 WBC
PH UR STRIP: 7 [PH] (ref 5–8)
PLATELET # BLD AUTO: 246 K/UL (ref 150–400)
PMV BLD AUTO: 8.9 FL (ref 8.9–12.9)
POTASSIUM SERPL-SCNC: 3.7 MMOL/L (ref 3.5–5.1)
PROT UR STRIP-MCNC: NEGATIVE MG/DL
RBC # BLD AUTO: 3.76 M/UL (ref 4.1–5.7)
RBC #/AREA URNS HPF: ABNORMAL /HPF (ref 0–5)
RBC MORPH BLD: ABNORMAL
SODIUM SERPL-SCNC: 135 MMOL/L (ref 136–145)
SP GR UR REFRACTOMETRY: 1.01 (ref 1–1.03)
UR CULT HOLD, URHOLD: NORMAL
UROBILINOGEN UR QL STRIP.AUTO: 1 EU/DL (ref 0.2–1)
WBC # BLD AUTO: 5.3 K/UL (ref 4.1–11.1)
WBC URNS QL MICRO: ABNORMAL /HPF (ref 0–4)

## 2019-06-20 PROCEDURE — 80048 BASIC METABOLIC PNL TOTAL CA: CPT

## 2019-06-20 PROCEDURE — 65660000000 HC RM CCU STEPDOWN

## 2019-06-20 PROCEDURE — 74011250636 HC RX REV CODE- 250/636: Performed by: INTERNAL MEDICINE

## 2019-06-20 PROCEDURE — 83880 ASSAY OF NATRIURETIC PEPTIDE: CPT

## 2019-06-20 PROCEDURE — 99232 SBSQ HOSP IP/OBS MODERATE 35: CPT | Performed by: INTERNAL MEDICINE

## 2019-06-20 PROCEDURE — 81001 URINALYSIS AUTO W/SCOPE: CPT

## 2019-06-20 PROCEDURE — 74011250637 HC RX REV CODE- 250/637: Performed by: NURSE PRACTITIONER

## 2019-06-20 PROCEDURE — 97116 GAIT TRAINING THERAPY: CPT

## 2019-06-20 PROCEDURE — 74011250637 HC RX REV CODE- 250/637: Performed by: INTERNAL MEDICINE

## 2019-06-20 PROCEDURE — 36415 COLL VENOUS BLD VENIPUNCTURE: CPT

## 2019-06-20 PROCEDURE — 83735 ASSAY OF MAGNESIUM: CPT

## 2019-06-20 PROCEDURE — 74011000250 HC RX REV CODE- 250: Performed by: NURSE PRACTITIONER

## 2019-06-20 PROCEDURE — 85025 COMPLETE CBC W/AUTO DIFF WBC: CPT

## 2019-06-20 RX ORDER — ENOXAPARIN SODIUM 100 MG/ML
100 INJECTION SUBCUTANEOUS ONCE
Status: COMPLETED | OUTPATIENT
Start: 2019-06-20 | End: 2019-06-20

## 2019-06-20 RX ADMIN — CARVEDILOL 6.25 MG: 6.25 TABLET, FILM COATED ORAL at 08:29

## 2019-06-20 RX ADMIN — AMIODARONE HYDROCHLORIDE 200 MG: 200 TABLET ORAL at 17:16

## 2019-06-20 RX ADMIN — AMIODARONE HYDROCHLORIDE 200 MG: 200 TABLET ORAL at 08:30

## 2019-06-20 RX ADMIN — SPIRONOLACTONE 25 MG: 25 TABLET ORAL at 08:30

## 2019-06-20 RX ADMIN — ENOXAPARIN SODIUM 100 MG: 100 INJECTION SUBCUTANEOUS at 09:36

## 2019-06-20 RX ADMIN — MILRINONE LACTATE IN DEXTROSE 0.2 MCG/KG/MIN: 200 INJECTION, SOLUTION INTRAVENOUS at 16:03

## 2019-06-20 RX ADMIN — Medication 10 ML: at 05:52

## 2019-06-20 RX ADMIN — Medication 10 ML: at 14:07

## 2019-06-20 RX ADMIN — CARVEDILOL 6.25 MG: 6.25 TABLET, FILM COATED ORAL at 17:16

## 2019-06-20 RX ADMIN — Medication 10 ML: at 22:00

## 2019-06-20 RX ADMIN — ASPIRIN 81 MG: 81 TABLET ORAL at 08:29

## 2019-06-20 RX ADMIN — BUMETANIDE 0.5 MG/HR: 0.25 INJECTION INTRAMUSCULAR; INTRAVENOUS at 05:51

## 2019-06-20 NOTE — PROGRESS NOTES
S Cardiology Progress Note                                        NGQVL Date: 5/31/2019 
  
  
Pt slept well but was reportedly hypoxic last night so O2 used  
Wt down another 3 pounds Assessment/Plan: # Acute on chronic systolic HF -EF 17% 
- improved initially  with diuretics, but creat increased and BP dropped; changed dobutamine to milrinone last monday as BP had increased;  improved diuresis with bumex drip on milrinone and creat stable; BNP improving 
  # AF -reverted to afib, cont eliquis and amiodarone; in aflutter, CV last monday with recurrent flutter; with repeat CV 5/18 
- Consider ablation in future once he has been optimized.  
  
# LBBB - Will plan upgrade to BiV ICD/pacer tomorrow at 2pm; eliquis on hold with one dose of lovenox this am. 
  
# Acute on chronic kidney injury-renal US normal; stabilized, though higher than his baseline; UO good 
  
# fever- urine culture positive with gram-rods; on rocephin-last day wednesday; repeat culture negative; afebrile for several days 
  #urinary retention-urology suggested home with neal but neal removed; urinating fine 
  
# Anemia-iron deficient; stable 
  
Exam: 
Blood pressure 113/72, pulse 78, temperature 97.6 °F (36.4 °C), resp. rate 20, weight 92.6 kg (204 lb 2.3 oz), SpO2 97 %. Lungs clear Cor reg with S3 Ext with tr edema Labs: 
Cr 2.1 K 3.7 Mg 2.2 BNP 3118 Of note, I am away tomorrow. Dr. Vanessa Vazquez to see at time of procedure.   
 
Jessi Moses MD

## 2019-06-20 NOTE — PROGRESS NOTES
[address] [facility] in [place], [place] Inpatient Progress Note Patient name: Sarah Bass Patient : 1950 Patient MRN: [id number] Attending MD: Chaparrita Oliver MD 
Date of service: 19 CHIEF COMPLAINT: 
Acute on chronic systolic heart failure HPI:  
Shagufta Dai is a 77 y/o pleasant white male with h/o HTN, HL, likey JOSE, CAD s/p cardiac arrest VFib s/p CABG () c/b sternal would infection and sternotomy, ischemic cardiomyopathy LVEF 15-20%, s/p ICD and with LBBB. Patient admitted with acute on chronic systolic heart failure with massive volume overload > 20 lbs, in the setting of atrial fibrillation s/p failed DCCV and new UTI now treated with IV antibiotics. He underwent DCCV and RHC on . He is undergoing inotrope assisted diuresis until CRT upgrade on . Recent Events: 
Adequate diuresis on current diuretic and inotropic support Cr stable Weaned off NC  
  
Plan: CRT upgraded planned for  at 2:00 pm  
Continue milrinone 0.2 mcg/kg/min and bumex gtt 0.5 mg/hr until CRT upgrade Continue spironolactone 25mg daily Continue amiodarone for rate control [facility] Intolerant to ACE/ARB/ARNi due to renal dysfunction and while aggressively diuresed Ambulate daily PT/OT Repeat UA d/t hx of  UTI  
  
IMPRESSION: 
Fatigue Shortness of breath Volume overload Pulmonary edema Acute on chronic systolic heart failure Stage C, NYHA class IIIA symptoms Coronary artery disease S/p arrest VFib and subsequent CABG  Sternal wound infection requiring sternectomy Ischemic cardiomyopathy, LVEF 20% 10/18 Atrial fibrillation s/p failed DCCV 6/10/19, s/p successful DCCV 19 Chronic anticoagulation with eliquis S/p ICD 3/21/11 LBBB,  ms Acute on CKD, stage 3 
UTI Urinary retension Anemia, iron deficiency H/o DVT Migraines JACQUE on 19 showed thrombus H/o tobacco abuse Nighttime desaturations, likely JOSE Body mass index is 27.99 kg/m². CARDIAC IMAGING: 
Echo (5/31/19) LVEF 21-25%, severely dilated LA, moderately dilated RA 
EKG (6/12/19) atrial flutter with occasional PVCs, LBBB QRS 158ms LHC not in epic 
  
HEMODYNAMICS: 
RHC not done CPEST not done 6MW not done 
  
OTHER IMAGING: 
CT chest (5/31/19) 1. No evidence of pulmonary embolus. 2. Interstitial edema and small bilateral pleural effusions. 3. Mild mediastinal adenopathy. 
  
General: Alert, oriented, no distress Cardiovascular: NSR, S1 S2, Pulm: Adequate oxygenation on RA, no distress Abdomen: Soft, no mass or tenderness. No organomegaly or hernia. Bowel sounds present. Genitourinary and rectal: deferred Extremities: No cyanosis, clubbing, or edema. Neurologic: No focal sensory or motor deficits are noted. Grossly intact. Psychiatric: Awake, alert an doriented x 3. Appropriate mood and affect. Skin: Warm, dry and well perfused. No lesions, nodules or rashes are noted. REVIEW OF SYSTEMS: 
General: Denies fever, night sweats. Ear, nose and throat: Denies difficulty hearing, sinus problems, runny nose, post-nasal drip, ringing in ears, mouth sores, loose teeth, ear pain, nosebleeds, sore throate, facial pain or numbess Cardiovascular: see above in the interval history Respiratory: Denies cough, wheezing, sputum production, hemoptysis. Dyspnea improved Gastrointestinal: Denies heartburn, constipation, intolerance to certain foods, diarrhea, abdominal pain, nausea, vomiting, difficulty swallowing, blood in stool Kidney and bladder: Denies painful urination, frequent urination, urgency, prostate problems and impotence Musculoskeletal: Denies joint pain, muscle weakness Skin and hair: Denies change in existing skin lesions, hair loss or increase, breast changes PAST MEDICAL HISTORY: 
Past Medical History:  
Diagnosis Date  Degenerative disc disease, lumbar  Heart failure ([facility]  High cholesterol  Hypertension  [facility] 2019  Spinal stenosis PAST SURGICAL HISTORY: 
Past Surgical History:  
Procedure Laterality Date  HX APPENDECTOMY  HX CORONARY ARTERY BYPASS GRAFT    
 triple  HX HERNIA REPAIR    
 HX IMPLANTABLE CARDIOVERTER DEFIBRILLATOR  [place] CARDIOVERSION ELECTIVE ARRHYTHMIA EXTERNAL N/A 6/10/2019 EP CARDIOVERSION performed by Kendall Stallworth MD at [address]  C[facility] LAB  [place] CARDIOVERSION ELECTIVE ARRHYTHMIA EXTERNAL N/A 2019 EP CARDIOVERSION performed by Bradly Slade MD at [address], [facility]/s Dr CATH LAB FAMILY HISTORY: 
History reviewed. No pertinent family history. SOCIAL HISTORY: 
Social History Socioeconomic History  Marital status:  Spouse name: Not on file  Number of children: Not on file  Years of education: Not on file  Highest education level: Not on file Tobacco Use  Smoking status: Former Smoker Last attempt to quit: 2010 Years since quittin.5  Smokeless tobacco: Never Used Substance and Sexual Activity  Alcohol use: Yes Comment: rarely  Drug use: Never LABORATORY RESULTS: 
  
Labs Latest Ref Rng & Units 2019 2019 2019 2019 2019 6/15/2019 2019 WBC 4.1 - 11.1 K/uL 5.3 6.1 5.2 5.8 5.7 5.4 6.2 RBC 4.10 - 5.70 M/uL 3.76(L) 3.82(L) 3.63(L) 3.59(L) 3.83(L) 3.36(L) 3.61(L) Hemoglobin 12.1 - 17.0 g/dL 11. 3(L) 11. 6(L) 10. 9(L) 10. 8(L) 11. 6(L) 10. 0(L) 11. 0(L) Hematocrit 36.6 - 50.3 % 34. 8(L) 35. 8(L) 33. 8(L) 33. 2(L) 35. 6(L) 31. 3(L) 34. 3(L) MCV 80.0 - 99.0 FL 92.6 93.7 93.1 92.5 93.0 93.2 95.0 Platelets 564 - 473 K/uL 246 286 287 296 267 322 293 Lymphocytes 12 - 49 % 14 14 - - - - - Monocytes 5 - 13 % 4(L) 8 - - - - - Eosinophils 0 - 7 % 1 3 - - - - - Basophils 0 - 1 % 0 1 - - - - - Bands 0 - 6 % - - - - - - - Albumin 3.5 - 5.0 g/dL - 3. 1(L) 2. 8(L) 2. 7(L) 2. 7(L) 2. 8(L) -  
 Calcium 8.5 - 10.1 MG/DL 8.9 8.8 9.1 9.1 9.0 8.7 9.0 SGOT 15 - 37 U/L - 17 16 14(L) 15 12(L) -  
Glucose 65 - 100 mg/dL 96 117(H) 98 95 92 114(H) 106(H) BUN 6 - 20 MG/DL 32(H) 31(H) 34(H) 32(H) 31(H) 31(H) 30(H) Creatinine 0.70 - 1.30 MG/DL 2.10(H) 2.20(H) 2.21(H) 2.13(H) 1.93(H) 1.94(H) 1.76(H) Sodium 136 - 145 mmol/L 135(L) 135(L) 135(L) 136 137 137 137 Potassium 3.5 - 5.1 mmol/L 3.7 3. 4(L) 3.9 3.8 4.2 3.6 4.4 TSH 0.36 - 3.74 uIU/mL - - - - - - - Some recent data might be hidden Lab Results Component Value Date/Time TSH 2.45 06/01/2019 04:16 AM  
 
 
CURRENT MEDICATIONS: 
 
Current Facility-Administered Medications:  
  carvedilol (COREG) tablet 6.25 mg, 6.25 mg, Oral, BID WITH MEALS, Cailin Herrera NP, 6.25 mg at 06/20/19 0829 
  bumetanide (BUMEX) 0.25 mg/mL infusion, 0.5 mg/hr, IntraVENous, CONTINUOUS, Cailin Herrera NP, Last Rate: 2 mL/hr at 06/20/19 0551, 0.5 mg/hr at 06/20/19 0551 
  milrinone (PRIMACOR) 20 MG/100 ML D5W infusion, 0.2 mcg/kg/min, IntraVENous, CONTINUOUS, Louann Valdivia MD, Last Rate: 5.8 mL/hr at 06/19/19 2104, 0.2 mcg/kg/min at 06/19/19 2104 
  evolocumab (REPATHA SURECLICK) pen injection 384 mg (Patient Supplied), 140 mg, SubCUTAneous, Q 14 DAYS, Kari Jain MD, 140 mg at 06/14/19 1735   spironolactone (ALDACTONE) tablet 25 mg, 25 mg, Oral, DAILY, Louann Valdivia MD, 25 mg at 06/20/19 0830 
  melatonin tablet 3 mg, 3 mg, Oral, QHS PRN, Bhetwal, Senora Bamberger, MD, 3 mg at 06/09/19 2125 
  amiodarone (CORDARONE) tablet 200 mg, 200 mg, Oral, BID, Louann Valdivia MD, 200 mg at 06/20/19 0830   aspirin delayed-release tablet 81 mg, 81 mg, Oral, DAILY, Lizet Beal MD, 81 mg at 06/20/19 0829 
  sodium chloride (NS) flush 5-40 mL, 5-40 mL, IntraVENous, Q8H, Lizet Beal MD, 10 mL at 06/20/19 1407   sodium chloride (NS) flush 5-40 mL, 5-40 mL, IntraVENous, PRN, Lizet Beal MD, 10 mL at 06/03/19 0410   ondansetron (ZOFRAN) injection 4 mg, 4 mg, IntraVENous, Q4H PRN, Lizet Beal MD 
  bisacodyl (DULCOLAX) tablet 5 mg, 5 mg, Oral, DAILY PRN, Lizet Beal MD 
  acetaminophen (TYLENOL) tablet 650 mg, 650 mg, Oral, Q4H PRN, Lizet Beal MD, 650 mg at 06/19/19 1701   morphine injection 2 mg, 2 mg, IntraVENous, Q4H PRN, Lizet Beal MD 
 
 
Thank you for allowing me to participate in this patient's care. Vince Frazier, AGACNP-BC [facility] [address] [facility] 
[address] [facility], [address] Phone: [phone] Fax: [phone] Patient seen and examined. Data and note reviewed. I have discussed and agree with the plans as noted. 76year old male with a history of CAD s/p CABG, ICM, who was admitted with acute on chronic systolic heart failure. He underwent RHC and DCCV to SR. IV milrinone resumed due to low CI and elevated PCWP. Awaiting BiVICD upgrade. Thank you for allowing us to participate in your patient's care. Mounika Blount MD, [facility] Chief of Cardiology, [facility] Medical Director [facility] [address] [facility] [address] [facility], [address] [facility], [address] Office [phone] Fax [phone]

## 2019-06-20 NOTE — PROGRESS NOTES
Problem: Heart Failure: Day 5 Goal: Treatments/Interventions/Procedures Outcome: Progressing Towards Goal 
  
Patient continues to be on Bumex gtt Educated on monitoring daily weights reporting weight change 2 lbs in a day or 5 lbs in a week to HCP. Low Na diet, exercise and monitor I&O's daily. Last 3 Recorded Weights in this Encounter 06/18/19 0451 06/19/19 0310 06/20/19 7869 Weight: 96.4 kg (212 lb 8.4 oz) 94.3 kg (207 lb 14.3 oz) 92.6 kg (204 lb 2.3 oz) Weight change: -1.7 kg (-3 lb 12 oz) Patient diuresing well, lost 1.7 kg

## 2019-06-20 NOTE — PROGRESS NOTES
NUTRITION Pt seen for:    
[]           Supplements  []             PO intake check  
[]           Food Allergies  []             Food Preferences/tolerances [x]           Rescreen   []             Education []           Diet order clarification []             Other RECOMMENDATIONS:  
Continue diet as ordered and daily weights SUBJECTIVE/OBJECTIVE:  
Chart reviewed. Pt up walking with his wife. Pt continues to eat well without issues, \"for the most part\". No concerns or risk for malnutrition noted at this time. Noted plans for pacemaker change tomorrow. Will rescreen as indicated. Diet: Cardiac Regular; 2gm Sodium Intake: [x]           Good     []           Fair      []           Poor Patient Vitals for the past 100 hrs: 
 % Diet Eaten 06/19/19 1830 50 % 06/19/19 1200 75 % 06/19/19 0800 100 % 06/18/19 0800 0 % 06/17/19 1610 50 % 06/17/19 1210 75 % 06/17/19 0810 75 % Weight Changes:  
Last 3 Recorded Weights in this Encounter 06/18/19 0451 06/19/19 0310 06/20/19 7230 Weight: 96.4 kg (212 lb 8.4 oz) 94.3 kg (207 lb 14.3 oz) 92.6 kg (204 lb 2.3 oz) Wt Readings from Last 10 Encounters:  
06/20/19 92.6 kg (204 lb 2.3 oz) 05/01/19 96.2 kg (212 lb) 04/02/19 97.5 kg (215 lb) Nutrition Problems Identified: 
[x]      None PLAN:  
[]           Obtained/adjusted food preferences/tolerances and/or snacks options []           Dislikes supplements will try a substitution []           Modify diet for food allergies []           Adjust texture due to difficulty chewing [x]    Continue current diet 
[]           Educated patient 
[]           Add Supplements Maria Isabel Manuel, [address] [facility]

## 2019-06-20 NOTE — PROGRESS NOTES
Problem: Mobility Impaired (Adult and Pediatric) Goal: *Acute Goals and Plan of Care (Insert Text) Description Physical Therapy Goals Revised 6/14/19 1. Patient will ambulate with modified independence for 400 feet with the least restrictive device within 7 day(s). Initiated 6/7/2019 1. Patient will ambulate with modified independence for 400 feet with the least restrictive device within 7 day(s). Outcome: Progressing Towards Goal 
 PHYSICAL THERAPY TREATMENT Patient: Ciro Bradford (77 y.o. male) Date: 6/20/2019 Diagnosis: Acute on chronic systolic CHF (congestive heart failure) ([facility]) [I50.23] Acute on chronic systolic CHF (congestive heart failure) ([facility] [place] [address] Procedure(s) (LRB): 
EP CARDIOVERSION (N/A) RIGHT HEART CATH (N/A) 2 Days Post-Op Precautions:   
Chart, physical therapy assessment, plan of care and goals were reviewed. ASSESSMENT: 
Patient is received supine in bed. He reports he has just walked with his wife but is agreeable to ambulating again. Patient is SBA for gait 300 ft with SPC. He demonstrates mild increased trunk sway and path deviations. He is provided VC to decrease his gait speed. Patient reports that when he decreases his step length he notices that his balance is worse. Patient would benefit from outpatient physical therapy on D/C in order to address cardiac diagnosis as well as balance. Progression toward goals: 
?    Improving appropriately and progressing toward goals ? Improving slowly and progressing toward goals ? Not making progress toward goals and plan of care will be adjusted PLAN: 
Patient continues to benefit from skilled intervention to address the above impairments. Continue treatment per established plan of care. Discharge Recommendations:  Outpatient and Outpatient Cardiac Rehab Further Equipment Recommendations for Discharge:  None SUBJECTIVE:  
Patient stated ?i'm just ready to get out of here. ? OBJECTIVE DATA SUMMARY:  
Critical Behavior: 
Neurologic State: Alert Orientation Level: Oriented X4 Cognition: Appropriate for age attention/concentration, Appropriate decision making, Follows commands Functional Mobility Training: 
Bed Mobility: 
  
Supine to Sit: Independent Sit to Supine: Independent Transfers: 
  
  
     
  
     
  
  
  
  
Balance: 
Sitting: Intact Standing: With support; Impaired Standing - Static: Good Standing - Dynamic : Fair Ambulation/Gait Training: 
Distance (ft): 300 Feet (ft) Assistive Device: Gait belt;Cane, straight Ambulation - Level of Assistance: Stand-by assistance Stairs: 
  
  
   
 
Neuro Re-Education: 
 
Therapeutic Exercises:  
 
Pain: 
Pain Scale 1: Numeric (0 - 10) Pain Intensity 1: 0 Activity Tolerance:  
Patient demonstrates excellent activity tolerance Please refer to the flowsheet for vital signs taken during this treatment. After treatment:  
?    Patient left in no apparent distress sitting up in chair ? Patient left in no apparent distress in bed 
? Call bell left within reach ? Nursing notified ? Caregiver present - wife ? Bed alarm activated COMMUNICATION/COLLABORATION:  
The patient?s plan of care was discussed with: Registered Nurse Nicki Ruano, PT Time Calculation: 10 mins

## 2019-06-20 NOTE — PROGRESS NOTES
Bedside and Verbal shift change report given to [address] [place] [address] (oncoming nurse) by Gerardo Sheppard RN (offgoing nurse). Report included the following information SBAR, Kardex, Intake/Output, MAR and Recent Results.

## 2019-06-21 ENCOUNTER — APPOINTMENT (OUTPATIENT)
Dept: GENERAL RADIOLOGY | Age: 69
DRG: 222 | End: 2019-06-21
Attending: INTERNAL MEDICINE
Payer: MEDICARE

## 2019-06-21 LAB
ANION GAP SERPL CALC-SCNC: 6 MMOL/L (ref 5–15)
BUN SERPL-MCNC: 32 MG/DL (ref 6–20)
BUN/CREAT SERPL: 15 (ref 12–20)
CALCIUM SERPL-MCNC: 9.4 MG/DL (ref 8.5–10.1)
CHLORIDE SERPL-SCNC: 94 MMOL/L (ref 97–108)
CO2 SERPL-SCNC: 32 MMOL/L (ref 21–32)
CREAT SERPL-MCNC: 2.14 MG/DL (ref 0.7–1.3)
ERYTHROCYTE [DISTWIDTH] IN BLOOD BY AUTOMATED COUNT: 13.6 % (ref 11.5–14.5)
GLUCOSE SERPL-MCNC: 106 MG/DL (ref 65–100)
HCT VFR BLD AUTO: 37.3 % (ref 36.6–50.3)
HGB BLD-MCNC: 12.3 G/DL (ref 12.1–17)
MCH RBC QN AUTO: 30.2 PG (ref 26–34)
MCHC RBC AUTO-ENTMCNC: 33 G/DL (ref 30–36.5)
MCV RBC AUTO: 91.6 FL (ref 80–99)
NRBC # BLD: 0 K/UL (ref 0–0.01)
NRBC BLD-RTO: 0 PER 100 WBC
PLATELET # BLD AUTO: 261 K/UL (ref 150–400)
PMV BLD AUTO: 9.2 FL (ref 8.9–12.9)
POTASSIUM SERPL-SCNC: 3.7 MMOL/L (ref 3.5–5.1)
RBC # BLD AUTO: 4.07 M/UL (ref 4.1–5.7)
SODIUM SERPL-SCNC: 132 MMOL/L (ref 136–145)
WBC # BLD AUTO: 6.3 K/UL (ref 4.1–11.1)

## 2019-06-21 PROCEDURE — C1751 CATH, INF, PER/CENT/MIDLINE: HCPCS | Performed by: INTERNAL MEDICINE

## 2019-06-21 PROCEDURE — 74011000272 HC RX REV CODE- 272: Performed by: INTERNAL MEDICINE

## 2019-06-21 PROCEDURE — 74011250637 HC RX REV CODE- 250/637: Performed by: INTERNAL MEDICINE

## 2019-06-21 PROCEDURE — C1900 LEAD, CORONARY VENOUS: HCPCS | Performed by: INTERNAL MEDICINE

## 2019-06-21 PROCEDURE — C1893 INTRO/SHEATH, FIXED,NON-PEEL: HCPCS | Performed by: INTERNAL MEDICINE

## 2019-06-21 PROCEDURE — 77030002996 HC SUT SLK J&J -A: Performed by: INTERNAL MEDICINE

## 2019-06-21 PROCEDURE — 33224 INSERT PACING LEAD & CONNECT: CPT | Performed by: INTERNAL MEDICINE

## 2019-06-21 PROCEDURE — 74011250636 HC RX REV CODE- 250/636: Performed by: INTERNAL MEDICINE

## 2019-06-21 PROCEDURE — 99153 MOD SED SAME PHYS/QHP EA: CPT | Performed by: INTERNAL MEDICINE

## 2019-06-21 PROCEDURE — C1882 AICD, OTHER THAN SING/DUAL: HCPCS | Performed by: INTERNAL MEDICINE

## 2019-06-21 PROCEDURE — 77030039266 HC ADH SKN EXOFIN S2SG -A: Performed by: INTERNAL MEDICINE

## 2019-06-21 PROCEDURE — 74011000250 HC RX REV CODE- 250: Performed by: INTERNAL MEDICINE

## 2019-06-21 PROCEDURE — 77030037029 HC IMPL ENV ICD ANTIBACT ABSRB TYRX MEDT -G: Performed by: INTERNAL MEDICINE

## 2019-06-21 PROCEDURE — 36415 COLL VENOUS BLD VENIPUNCTURE: CPT

## 2019-06-21 PROCEDURE — 85027 COMPLETE CBC AUTOMATED: CPT

## 2019-06-21 PROCEDURE — 74011250637 HC RX REV CODE- 250/637: Performed by: NURSE PRACTITIONER

## 2019-06-21 PROCEDURE — 74011636320 HC RX REV CODE- 636/320: Performed by: INTERNAL MEDICINE

## 2019-06-21 PROCEDURE — 77030012935 HC DRSG AQUACEL BMS -B: Performed by: INTERNAL MEDICINE

## 2019-06-21 PROCEDURE — C1769 GUIDE WIRE: HCPCS | Performed by: INTERNAL MEDICINE

## 2019-06-21 PROCEDURE — 02HK3KZ INSERTION OF DEFIBRILLATOR LEAD INTO RIGHT VENTRICLE, PERCUTANEOUS APPROACH: ICD-10-PCS | Performed by: INTERNAL MEDICINE

## 2019-06-21 PROCEDURE — 0JPT0PZ REMOVAL OF CARDIAC RHYTHM RELATED DEVICE FROM TRUNK SUBCUTANEOUS TISSUE AND FASCIA, OPEN APPROACH: ICD-10-PCS | Performed by: INTERNAL MEDICINE

## 2019-06-21 PROCEDURE — 77030028698 HC BLD TISS PLSM MEDT -D: Performed by: INTERNAL MEDICINE

## 2019-06-21 PROCEDURE — C1892 INTRO/SHEATH,FIXED,PEEL-AWAY: HCPCS | Performed by: INTERNAL MEDICINE

## 2019-06-21 PROCEDURE — 77030031139 HC SUT VCRL2 J&J -A: Performed by: INTERNAL MEDICINE

## 2019-06-21 PROCEDURE — 0JH608Z INSERTION OF DEFIBRILLATOR GENERATOR INTO CHEST SUBCUTANEOUS TISSUE AND FASCIA, OPEN APPROACH: ICD-10-PCS | Performed by: INTERNAL MEDICINE

## 2019-06-21 PROCEDURE — 02HL3KZ INSERTION OF DEFIBRILLATOR LEAD INTO LEFT VENTRICLE, PERCUTANEOUS APPROACH: ICD-10-PCS | Performed by: INTERNAL MEDICINE

## 2019-06-21 PROCEDURE — 33249 INSJ/RPLCMT DEFIB W/LEAD(S): CPT | Performed by: INTERNAL MEDICINE

## 2019-06-21 PROCEDURE — 33225 L VENTRIC PACING LEAD ADD-ON: CPT | Performed by: INTERNAL MEDICINE

## 2019-06-21 PROCEDURE — 77030039046 HC PAD DEFIB RADIOTRNSPNT CNMD -B: Performed by: INTERNAL MEDICINE

## 2019-06-21 PROCEDURE — 77030018673: Performed by: INTERNAL MEDICINE

## 2019-06-21 PROCEDURE — 80048 BASIC METABOLIC PNL TOTAL CA: CPT

## 2019-06-21 PROCEDURE — C1898 LEAD, PMKR, OTHER THAN TRANS: HCPCS | Performed by: INTERNAL MEDICINE

## 2019-06-21 PROCEDURE — 71045 X-RAY EXAM CHEST 1 VIEW: CPT

## 2019-06-21 PROCEDURE — 65660000000 HC RM CCU STEPDOWN

## 2019-06-21 PROCEDURE — 77030028700 HC BLD TISS PLSM MEDT -E: Performed by: INTERNAL MEDICINE

## 2019-06-21 PROCEDURE — 33262 RMVL& REPLC PULSE GEN 1 LEAD: CPT | Performed by: INTERNAL MEDICINE

## 2019-06-21 PROCEDURE — 33241 REMOVE PULSE GENERATOR: CPT | Performed by: INTERNAL MEDICINE

## 2019-06-21 PROCEDURE — 02H63KZ INSERTION OF DEFIBRILLATOR LEAD INTO RIGHT ATRIUM, PERCUTANEOUS APPROACH: ICD-10-PCS | Performed by: INTERNAL MEDICINE

## 2019-06-21 PROCEDURE — 77030019580 HC CBL PACE MEDT -B: Performed by: INTERNAL MEDICINE

## 2019-06-21 PROCEDURE — 77010033678 HC OXYGEN DAILY

## 2019-06-21 PROCEDURE — 3E0102A INTRODUCTION OF ANTI-INFECTIVE ENVELOPE INTO SUBCUTANEOUS TISSUE, OPEN APPROACH: ICD-10-PCS | Performed by: INTERNAL MEDICINE

## 2019-06-21 PROCEDURE — 99152 MOD SED SAME PHYS/QHP 5/>YRS: CPT | Performed by: INTERNAL MEDICINE

## 2019-06-21 PROCEDURE — 74011250636 HC RX REV CODE- 250/636

## 2019-06-21 DEVICE — LEAD PCMKR TENDRIL 52CM --: Type: IMPLANTABLE DEVICE | Site: CHEST  WALL | Status: FUNCTIONAL

## 2019-06-21 DEVICE — LEAD PACE 47FR L86CM 60MM SPACE L HRT 16 CRV TRAD S SHP: Type: IMPLANTABLE DEVICE | Site: CHEST  WALL | Status: FUNCTIONAL

## 2019-06-21 DEVICE — ENVELOPE CMRM6133 ABSORB LRG US
Type: IMPLANTABLE DEVICE | Site: CHEST  WALL | Status: FUNCTIONAL
Brand: TYRX™

## 2019-06-21 DEVICE — IMPLANTABLE DEVICE: Type: IMPLANTABLE DEVICE | Site: CHEST  WALL | Status: FUNCTIONAL

## 2019-06-21 RX ORDER — CEFAZOLIN SODIUM/WATER 2 G/20 ML
SYRINGE (ML) INTRAVENOUS AS NEEDED
Status: DISCONTINUED | OUTPATIENT
Start: 2019-06-21 | End: 2019-06-21 | Stop reason: HOSPADM

## 2019-06-21 RX ORDER — OXYCODONE AND ACETAMINOPHEN 5; 325 MG/1; MG/1
1 TABLET ORAL
Status: DISCONTINUED | OUTPATIENT
Start: 2019-06-21 | End: 2019-06-26 | Stop reason: HOSPADM

## 2019-06-21 RX ORDER — MIDAZOLAM HYDROCHLORIDE 1 MG/ML
INJECTION, SOLUTION INTRAMUSCULAR; INTRAVENOUS AS NEEDED
Status: DISCONTINUED | OUTPATIENT
Start: 2019-06-21 | End: 2019-06-21 | Stop reason: HOSPADM

## 2019-06-21 RX ORDER — LIDOCAINE HYDROCHLORIDE AND EPINEPHRINE 10; 10 MG/ML; UG/ML
INJECTION, SOLUTION INFILTRATION; PERINEURAL AS NEEDED
Status: DISCONTINUED | OUTPATIENT
Start: 2019-06-21 | End: 2019-06-21 | Stop reason: HOSPADM

## 2019-06-21 RX ORDER — FENTANYL CITRATE 50 UG/ML
INJECTION, SOLUTION INTRAMUSCULAR; INTRAVENOUS AS NEEDED
Status: DISCONTINUED | OUTPATIENT
Start: 2019-06-21 | End: 2019-06-21 | Stop reason: HOSPADM

## 2019-06-21 RX ORDER — CEFAZOLIN SODIUM/WATER 2 G/20 ML
2 SYRINGE (ML) INTRAVENOUS EVERY 8 HOURS
Status: COMPLETED | OUTPATIENT
Start: 2019-06-21 | End: 2019-06-22

## 2019-06-21 RX ORDER — SODIUM CHLORIDE 0.9 % (FLUSH) 0.9 %
5-40 SYRINGE (ML) INJECTION AS NEEDED
Status: DISCONTINUED | OUTPATIENT
Start: 2019-06-21 | End: 2019-06-26 | Stop reason: HOSPADM

## 2019-06-21 RX ORDER — SODIUM CHLORIDE 0.9 % (FLUSH) 0.9 %
5-40 SYRINGE (ML) INJECTION EVERY 8 HOURS
Status: DISCONTINUED | OUTPATIENT
Start: 2019-06-21 | End: 2019-06-26 | Stop reason: HOSPADM

## 2019-06-21 RX ADMIN — Medication 10 ML: at 18:09

## 2019-06-21 RX ADMIN — Medication 10 ML: at 23:48

## 2019-06-21 RX ADMIN — ASPIRIN 81 MG: 81 TABLET ORAL at 08:06

## 2019-06-21 RX ADMIN — AMIODARONE HYDROCHLORIDE 200 MG: 200 TABLET ORAL at 18:09

## 2019-06-21 RX ADMIN — Medication 10 ML: at 06:00

## 2019-06-21 RX ADMIN — CARVEDILOL 6.25 MG: 6.25 TABLET, FILM COATED ORAL at 08:07

## 2019-06-21 RX ADMIN — Medication 2 G: at 23:48

## 2019-06-21 RX ADMIN — MILRINONE LACTATE IN DEXTROSE 0.2 MCG/KG/MIN: 200 INJECTION, SOLUTION INTRAVENOUS at 11:27

## 2019-06-21 RX ADMIN — CARVEDILOL 6.25 MG: 6.25 TABLET, FILM COATED ORAL at 18:12

## 2019-06-21 RX ADMIN — Medication 10 ML: at 18:10

## 2019-06-21 RX ADMIN — AMIODARONE HYDROCHLORIDE 200 MG: 200 TABLET ORAL at 08:06

## 2019-06-21 RX ADMIN — SPIRONOLACTONE 25 MG: 25 TABLET ORAL at 08:06

## 2019-06-21 NOTE — PROGRESS NOTES
Cardiac Cath Lab Procedure Area Arrival Note: 
 
Martha Gallagher arrived to Cardiac Cath Lab, Procedure Area. Patient identifiers verified with NAME and DATE OF BIRTH. Procedure verified with patient. Consent forms verified. Allergies verified. Patient informed of procedure and plan of care. Questions answered with review. Patient voiced understanding of procedure and plan of care. Patient on cardiac monitor, non-invasive blood pressure, SPO2 monitor. On ra, placed on O2 @ 2 lpm via nc. IV of ns on pump at 25 ml/hr. Patient status doing well without problems. Patient is A&Ox 3. Patient reports no pain. Patient medicated during procedure with orders obtained and verified by Dr. Yariel Hanks. Refer to patients Cardiac Cath Lab PROCEDURE REPORT for vital signs, assessment, status, and response during procedure, printed at end of case. Printed report on chart or scanned into chart.

## 2019-06-21 NOTE — PROGRESS NOTES
Hospitalist Progress Note Power Rodas MD 
Please call  and page for questions. Call physician on-call through the  7pm-7am 
 
Daily Progress Note: 6/21/2019 Primary care Nancy Alfred MD 
 
Date of admission: 5/31/2019  7:03 PM 
 
Admission summery and hospital course: 
60-year-old man with past medical history significant for chronic systolic congestive heart failure, dyslipidemia, hypertension, atrial fibrillation status post cardioversion, was in his usual state of health until the day of presentation at the emergency room when the patient developed worsening of his shortness of breath.  The inpatient underwent cardioversion early this morning by his cardiologist. 
Everlina Backers started on 06/05. 
  
Subjective:  
 
Patient is seen and examined at bedside. Feels ok now. He was waiting for pacemaker upgrade today. No new complaints. Assessment/Plan:  
Acute-on-chronic systolic congestive heart failure NYHA 2-3 - improving JACQUE on 05/31 with EF of 21%-25%.      
Continue coreg CTA of the chest is negative for pulmonary embolism.  Appreciated Dr Edward Javier input. AHF team on board C/w  milrinone and bumex drip RHC done 6/18 - elevated pressures C/w po aldactone Saturating on RA 
  
Atrial fibrillation, status post cardioversion 6/18   
Aflutter - reverted to afib 
- he had cardioversions in the past 
- c/w amiodarone. Eliquis on hold for procedure tomorrow LBBB - need upgrade to BiV ICD/pacer today.   
 
Hypertension. stable . C/w coreg Acute kidney injury on CKD   
Creatinine stable -  Most likely as a result due to diuresis  
- Renal US: 06/05 was normal.  
- avoid nephrotoxins - will monitor Hypokalemia- resolved Anemia-iron deficient - hb stable UTI:  
Afebrile. Completed Rocephin.  
Ucx: Enterobacter Cloacae Urinary retention- resolved, off neal Insomnia: melatonin. 
  
VTE prophylaxis: Eliquis on hold   
 Code status: Full Discussed plan of care with Patient/Family and Nurse.  
Pre-admission lived at home. Discharge planning: TBD. Home possible tomorrow. outpt cardiac rehab Review of Systems:  
 
Review of Systems: 
Symptom  Y/N  Comments   Symptom  Y/N  Comments Fever/Chills   n   Chest Pain  n   
Poor Appetite  n    Edema  n    
Cough  n   Abdominal Pain  n    
Sputum  n   Joint Pain SOB/DOUGLAS  y Improved   Pruritis/Rash Nausea/vomit  n   Tolerating PT/OT Diarrhea     Tolerating Diet  y Constipation     Other Could not obtain due to:    
 
 
Objective:  
Physical Exam:  
 
Visit Vitals /76 Pulse 74 Temp 98 °F (36.7 °C) Resp 14 Wt 91.5 kg (201 lb 11.5 oz) SpO2 98% BMI 25.90 kg/m² O2 Flow Rate (L/min): 2 l/min O2 Device: Room air Temp (24hrs), Av.4 °F (36.9 °C), Min:98 °F (36.7 °C), Max:99.6 °F (37.6 °C) 
  701 -  1900 In: 28 [I.V.:32] Out: 300 [Urine:300]   1901 -  0700 In: 933.8 [P.O.:800; I.V.:133.8] Out: 4575 [DTXZD:3482] General:  Alert, cooperative, no distress, appears stated age. Lungs:     clear eldon bases Heart:  Regular rate and rhythm, S1, S2 normal, no murmur.   
Abdomen:   Soft, non-tender. Bowel sounds normal.   
Extremities: Extremities normal, atraumatic, no cyanosis. Edema at LEs. Skin: Skin color, texture, turgor normal. No rashes or lesions Neurologic: Grossly normal.   
  
Data Review:  
   
Recent Days: 
Recent Labs  
  19 
0114 19 
0410 WBC 6.3 5.3 6.1 HGB 12.3 11.3* 11.6* HCT 37.3 34.8* 35.8*  246 286 Recent Labs  
  19 
0449 19 
0114 19 
0410 * 135* 135* K 3.7 3.7 3.4*  
CL 94* 96* 96* CO2 32 34* 32 * 96 117* BUN 32* 32* 31* CREA 2.14* 2.10* 2.20* CA 9.4 8.9 8.8 MG  --  2.2 2.4 ALB  --   --  3.1*  
SGOT  --   --  17 ALT  --   --  25  
 
 No results for input(s): PH, PCO2, PO2, HCO3, FIO2 in the last 72 hours. 24 Hour Results: 
Recent Results (from the past 24 hour(s)) URINALYSIS W/MICROSCOPIC Collection Time: 06/20/19  5:17 PM  
Result Value Ref Range Color YELLOW/STRAW Appearance CLEAR CLEAR Specific gravity 1.009 1.003 - 1.030    
 pH (UA) 7.0 5.0 - 8.0 Protein NEGATIVE  NEG mg/dL Glucose NEGATIVE  NEG mg/dL Ketone NEGATIVE  NEG mg/dL Bilirubin NEGATIVE  NEG Blood MODERATE (A) NEG Urobilinogen 1.0 0.2 - 1.0 EU/dL Nitrites NEGATIVE  NEG Leukocyte Esterase NEGATIVE  NEG    
 WBC 0-4 0 - 4 /hpf  
 RBC 10-20 0 - 5 /hpf Epithelial cells FEW FEW /lpf Bacteria NEGATIVE  NEG /hpf Hyaline cast 0-2 0 - 5 /lpf URINE CULTURE HOLD SAMPLE Collection Time: 06/20/19  5:17 PM  
Result Value Ref Range Urine culture hold URINE ON HOLD IN MICROBIOLOGY DEPT FOR 3 DAYS. IF UNPRESERVED URINE IS SUBMITTED, IT CANNOT BE USED FOR ADDITIONAL TESTING AFTER 24 HRS, RECOLLECTION WILL BE REQUIRED. CBC W/O DIFF Collection Time: 06/21/19  4:49 AM  
Result Value Ref Range WBC 6.3 4.1 - 11.1 K/uL  
 RBC 4.07 (L) 4.10 - 5.70 M/uL  
 HGB 12.3 12.1 - 17.0 g/dL HCT 37.3 36.6 - 50.3 % MCV 91.6 80.0 - 99.0 FL  
 MCH 30.2 26.0 - 34.0 PG  
 MCHC 33.0 30.0 - 36.5 g/dL  
 RDW 13.6 11.5 - 14.5 % PLATELET 084 894 - 297 K/uL MPV 9.2 8.9 - 12.9 FL  
 NRBC 0.0 0  WBC ABSOLUTE NRBC 0.00 0.00 - 0.01 K/uL METABOLIC PANEL, BASIC Collection Time: 06/21/19  4:49 AM  
Result Value Ref Range Sodium 132 (L) 136 - 145 mmol/L Potassium 3.7 3.5 - 5.1 mmol/L Chloride 94 (L) 97 - 108 mmol/L  
 CO2 32 21 - 32 mmol/L Anion gap 6 5 - 15 mmol/L Glucose 106 (H) 65 - 100 mg/dL BUN 32 (H) 6 - 20 MG/DL Creatinine 2.14 (H) 0.70 - 1.30 MG/DL  
 BUN/Creatinine ratio 15 12 - 20 GFR est AA 37 (L) >60 ml/min/1.73m2 GFR est non-AA 31 (L) >60 ml/min/1.73m2 Calcium 9.4 8.5 - 10.1 MG/DL Problem List: 
Problem List as of 6/21/2019 Date Reviewed: 6/10/2019 Codes Class Noted - Resolved * (Principal) Acute on chronic systolic CHF (congestive heart failure) (HCC) ICD-10-CM: X76.63 ICD-9-CM: 428.23, 428.0  5/31/2019 - Present Paroxysmal atrial fibrillation (HCC) ICD-10-CM: I48.0 ICD-9-CM: 427.31  4/2/2019 - Present Medications reviewed Current Facility-Administered Medications Medication Dose Route Frequency  sodium chloride (NS) flush 5-40 mL  5-40 mL IntraVENous Q8H  
 sodium chloride (NS) flush 5-40 mL  5-40 mL IntraVENous PRN  
 oxyCODONE-acetaminophen (PERCOCET) 5-325 mg per tablet 1 Tab  1 Tab Oral Q4H PRN  
 ceFAZolin (ANCEF) 2 g/20 mL in sterile water IV syringe  2 g IntraVENous Q8H  
 carvedilol (COREG) tablet 6.25 mg  6.25 mg Oral BID WITH MEALS  
 bumetanide (BUMEX) 0.25 mg/mL infusion  0.5 mg/hr IntraVENous CONTINUOUS  
 milrinone (PRIMACOR) 20 MG/100 ML D5W infusion  0.2 mcg/kg/min IntraVENous CONTINUOUS  
 evolocumab (REPATHA SURECLICK) pen injection 685 mg (Patient Supplied)  140 mg SubCUTAneous Q 14 DAYS  spironolactone (ALDACTONE) tablet 25 mg  25 mg Oral DAILY  melatonin tablet 3 mg  3 mg Oral QHS PRN  
 amiodarone (CORDARONE) tablet 200 mg  200 mg Oral BID  aspirin delayed-release tablet 81 mg  81 mg Oral DAILY  sodium chloride (NS) flush 5-40 mL  5-40 mL IntraVENous Q8H  
 sodium chloride (NS) flush 5-40 mL  5-40 mL IntraVENous PRN  
 ondansetron (ZOFRAN) injection 4 mg  4 mg IntraVENous Q4H PRN  
 bisacodyl (DULCOLAX) tablet 5 mg  5 mg Oral DAILY PRN  
 acetaminophen (TYLENOL) tablet 650 mg  650 mg Oral Q4H PRN  
 morphine injection 2 mg  2 mg IntraVENous Q4H PRN Care Plan discussed with: Patient/Family and Nurse Total time spent with patient: 30 minutes.  
 
Zhang Martin MD

## 2019-06-21 NOTE — PROGRESS NOTES
TRANSFER - IN REPORT: 
 
Verbal report received from [employer] on [employer]  being received from procedure for routine progression of care. Report consisted of patients Situation, Background, Assessment and Recommendations(SBAR). Information from the following report(s) Procedure Summary, MAR, Recent Results and Med Rec Status was reviewed with the receiving clinician. Opportunity for questions and clarification was provided. Assessment completed upon patients arrival to [address] [place] [address] and care assumed. Cardiac Cath Lab Recovery Arrival Note: 
 
[employer] arrived to [facility] recovery area. Patient procedure= BIVICD . Patient on cardiac monitor, non-invasive blood pressure, SPO2 monitor. On room air . .  IV  of bumex and milirinone gtt on pump at 2 ml/hr and 5.9 ml/hr. Patient status doing well without problems. Patient is A&Ox 4. Patient reports no complaints. PROCEDURE SITE CHECK: 
 
Procedure site:without any bleeding and or hematoma, no pain/discomfort reported at procedure site. No change in patient status. Continue to monitor patient and status. Dr Rj Kaur talked with pt and wife. [address] completed

## 2019-06-21 NOTE — PROGRESS NOTES
Pt is off the unit in the CCL. PT will defer, follow, and see as appropriate.  Thank you, Joel Segal, PT

## 2019-06-21 NOTE — PROGRESS NOTES
Calos Rueda 1721 Patient in cath lab for procedure, will follow up tomorrow Roel Rueda [address], [address] [address] Office [phone] Fax [phone]

## 2019-06-21 NOTE — PROGRESS NOTES
No vancomycin needed post procedure  per cardio Problem: Heart Failure: Day 5 Goal: Treatments/Interventions/Procedures Outcome: Progressing Towards Goal 
  
 
Patient continues to be on bumex gtt, Diuresing. Educated pt on daily weights, low Na diet, monitoring I&O's and exercise. Will continue to monitor Last 3 Recorded Weights in this Encounter 06/19/19 0310 06/20/19 0319 06/21/19 [id number] Weight: 94.3 kg (207 lb 14.3 oz) 92.6 kg (204 lb 2.3 oz) 91.5 kg (201 lb 11.5 oz) Weight change: -1.1 kg (-2 lb 6.8 oz)

## 2019-06-21 NOTE — PROGRESS NOTES
Cardiac Cath Lab Recovery Arrival Note: 
 
 
Fiona Lama arrived to Cardiac Cath Lab, Recovery Area. Staff introduced to patient. Patient identifiers verified with NAME and DATE OF BIRTH. Procedure verified with patient. Consent forms reviewed and signed by patient or authorized representative and verified. Allergies verified. Patient and family oriented to department. Patient and family informed of procedure and plan of care. Questions answered with review. Patient prepped for procedure, per orders from physician, prior to arrival. 
 
Patient on cardiac monitor, non-invasive blood pressure, SPO2 monitor. On room air. Patient is A&Ox 4. Patient reports no complaints. Patient in stretcher, in low position, with side rails up, call bell within reach, patient instructed to call if assistance as needed. Patient prep in: [address], [place]. Patient family has pager # [phone] Family in: wife in hospital.  
Prep by: Refugio Nicolas RN

## 2019-06-21 NOTE — PROGRESS NOTES
TRANSFER - OUT REPORT: 
 
Verbal report given to Guilherme RN(name) on Princess Kim  being transferred to [facility]) for routine progression of care Report consisted of patients Situation, Background, Assessment and  
Recommendations(SBAR). Information from the following report(s) Procedure Summary, Intake/Output, MAR, Accordion, Recent Results, Med Rec Status and Cardiac Rhythm paced was reviewed with the receiving nurse. Lines:  
Peripheral IV 06/06/19 Right Hand (Active) Site Assessment Clean, dry, & intact 6/21/2019 11:49 AM  
Phlebitis Assessment 0 6/21/2019 11:49 AM  
Infiltration Assessment 0 6/21/2019 11:49 AM  
Dressing Status Clean, dry, & intact 6/21/2019 11:49 AM  
Dressing Type Tape;Transparent 6/21/2019 11:49 AM  
Hub Color/Line Status Blue 6/21/2019 11:49 AM  
Action Taken Open ports on tubing capped 6/21/2019 11:49 AM  
Alcohol Cap Used Yes 6/21/2019 11:49 AM  
   
Peripheral IV 06/08/19 Right Forearm (Active) Site Assessment Clean, dry, & intact 6/21/2019 11:49 AM  
Phlebitis Assessment 0 6/21/2019 11:49 AM  
Infiltration Assessment 0 6/21/2019 11:49 AM  
Dressing Status Clean, dry, & intact 6/21/2019 11:49 AM  
Dressing Type Tape;Transparent 6/21/2019 11:49 AM  
Hub Color/Line Status Pink 6/21/2019 11:49 AM  
Action Taken Open ports on tubing capped 6/21/2019 11:49 AM  
Alcohol Cap Used Yes 6/21/2019 11:49 AM  
   
Peripheral IV 06/15/19 Right Antecubital (Active) Site Assessment Clean, dry, & intact 6/21/2019 11:49 AM  
Phlebitis Assessment 0 6/21/2019 11:49 AM  
Infiltration Assessment 0 6/21/2019 11:49 AM  
Dressing Status Clean, dry, & intact 6/21/2019 11:49 AM  
Dressing Type Tape;Transparent 6/21/2019 11:49 AM  
Hub Color/Line Status Pink 6/21/2019 11:49 AM  
Action Taken Open ports on tubing capped 6/21/2019 11:49 AM  
Alcohol Cap Used Yes 6/21/2019 11:49 AM  
   
Peripheral IV 06/21/19 Left;Upper; Other (Comment) Forearm (Active) Opportunity for questions and clarification was provided. Patient transported with: 
 Tech  
1650 pt transferred to room 421 via stretcher with transporter. Sling to left arm and ice to left chest wall.

## 2019-06-21 NOTE — PROGRESS NOTES
TRANSFER - OUT REPORT: 
 
Verbal report given to Ch[place]ita on Adine Stands being transferred to  for routine progression of care Report consisted of patients Situation, Background, Assessment and  
Recommendations(SBAR). Information from the following report(s) SBAR, Procedure Summary and MAR was reviewed with the receiving nurse. Opportunity for questions and clarification was provided.

## 2019-06-22 LAB
ALBUMIN SERPL-MCNC: 3.3 G/DL (ref 3.5–5)
ALBUMIN/GLOB SERPL: 0.8 {RATIO} (ref 1.1–2.2)
ALP SERPL-CCNC: 98 U/L (ref 45–117)
ALT SERPL-CCNC: 23 U/L (ref 12–78)
ANION GAP SERPL CALC-SCNC: 11 MMOL/L (ref 5–15)
ANION GAP SERPL CALC-SCNC: 9 MMOL/L (ref 5–15)
AST SERPL-CCNC: 25 U/L (ref 15–37)
BILIRUB SERPL-MCNC: 0.5 MG/DL (ref 0.2–1)
BNP SERPL-MCNC: 1904 PG/ML
BUN SERPL-MCNC: 31 MG/DL (ref 6–20)
BUN SERPL-MCNC: 31 MG/DL (ref 6–20)
BUN/CREAT SERPL: 15 (ref 12–20)
BUN/CREAT SERPL: 15 (ref 12–20)
CALCIUM SERPL-MCNC: 9.3 MG/DL (ref 8.5–10.1)
CALCIUM SERPL-MCNC: 9.3 MG/DL (ref 8.5–10.1)
CHLORIDE SERPL-SCNC: 91 MMOL/L (ref 97–108)
CHLORIDE SERPL-SCNC: 92 MMOL/L (ref 97–108)
CO2 SERPL-SCNC: 30 MMOL/L (ref 21–32)
CO2 SERPL-SCNC: 32 MMOL/L (ref 21–32)
CREAT SERPL-MCNC: 2.02 MG/DL (ref 0.7–1.3)
CREAT SERPL-MCNC: 2.05 MG/DL (ref 0.7–1.3)
GLOBULIN SER CALC-MCNC: 4.2 G/DL (ref 2–4)
GLUCOSE SERPL-MCNC: 88 MG/DL (ref 65–100)
GLUCOSE SERPL-MCNC: 95 MG/DL (ref 65–100)
MAGNESIUM SERPL-MCNC: 2.2 MG/DL (ref 1.6–2.4)
POTASSIUM SERPL-SCNC: 3.6 MMOL/L (ref 3.5–5.1)
POTASSIUM SERPL-SCNC: 3.7 MMOL/L (ref 3.5–5.1)
PROT SERPL-MCNC: 7.5 G/DL (ref 6.4–8.2)
SODIUM SERPL-SCNC: 132 MMOL/L (ref 136–145)
SODIUM SERPL-SCNC: 133 MMOL/L (ref 136–145)

## 2019-06-22 PROCEDURE — 36415 COLL VENOUS BLD VENIPUNCTURE: CPT

## 2019-06-22 PROCEDURE — 99232 SBSQ HOSP IP/OBS MODERATE 35: CPT | Performed by: INTERNAL MEDICINE

## 2019-06-22 PROCEDURE — 74011250636 HC RX REV CODE- 250/636: Performed by: INTERNAL MEDICINE

## 2019-06-22 PROCEDURE — 74011250637 HC RX REV CODE- 250/637: Performed by: INTERNAL MEDICINE

## 2019-06-22 PROCEDURE — 83880 ASSAY OF NATRIURETIC PEPTIDE: CPT

## 2019-06-22 PROCEDURE — 65660000000 HC RM CCU STEPDOWN

## 2019-06-22 PROCEDURE — 74011000250 HC RX REV CODE- 250: Performed by: INTERNAL MEDICINE

## 2019-06-22 PROCEDURE — 80053 COMPREHEN METABOLIC PANEL: CPT

## 2019-06-22 PROCEDURE — 83735 ASSAY OF MAGNESIUM: CPT

## 2019-06-22 RX ORDER — TORSEMIDE 20 MG/1
40 TABLET ORAL DAILY
Status: DISCONTINUED | OUTPATIENT
Start: 2019-06-23 | End: 2019-06-22

## 2019-06-22 RX ORDER — POTASSIUM CHLORIDE 750 MG/1
20 TABLET, FILM COATED, EXTENDED RELEASE ORAL
Status: COMPLETED | OUTPATIENT
Start: 2019-06-22 | End: 2019-06-22

## 2019-06-22 RX ORDER — TORSEMIDE 20 MG/1
40 TABLET ORAL 2 TIMES DAILY
Status: DISCONTINUED | OUTPATIENT
Start: 2019-06-22 | End: 2019-06-26 | Stop reason: HOSPADM

## 2019-06-22 RX ADMIN — AMIODARONE HYDROCHLORIDE 200 MG: 200 TABLET ORAL at 17:46

## 2019-06-22 RX ADMIN — MILRINONE LACTATE IN DEXTROSE 0.2 MCG/KG/MIN: 200 INJECTION, SOLUTION INTRAVENOUS at 08:48

## 2019-06-22 RX ADMIN — Medication 10 ML: at 14:38

## 2019-06-22 RX ADMIN — CARVEDILOL 6.25 MG: 6.25 TABLET, FILM COATED ORAL at 08:44

## 2019-06-22 RX ADMIN — POTASSIUM CHLORIDE 20 MEQ: 750 TABLET, FILM COATED, EXTENDED RELEASE ORAL at 12:39

## 2019-06-22 RX ADMIN — Medication 10 ML: at 06:17

## 2019-06-22 RX ADMIN — AMIODARONE HYDROCHLORIDE 200 MG: 200 TABLET ORAL at 08:44

## 2019-06-22 RX ADMIN — Medication 2 G: at 06:17

## 2019-06-22 RX ADMIN — TORSEMIDE 40 MG: 20 TABLET ORAL at 17:46

## 2019-06-22 RX ADMIN — BUMETANIDE 0.5 MG/HR: 0.25 INJECTION INTRAMUSCULAR; INTRAVENOUS at 01:32

## 2019-06-22 RX ADMIN — SPIRONOLACTONE 25 MG: 25 TABLET ORAL at 08:44

## 2019-06-22 RX ADMIN — CARVEDILOL 6.25 MG: 6.25 TABLET, FILM COATED ORAL at 17:47

## 2019-06-22 RX ADMIN — ASPIRIN 81 MG: 81 TABLET ORAL at 08:44

## 2019-06-22 RX ADMIN — APIXABAN 5 MG: 5 TABLET, FILM COATED ORAL at 17:47

## 2019-06-22 NOTE — PROGRESS NOTES
Bedside shift change report given to KATRIN Bell (oncoming nurse) by Power Singh (offgoing nurse). Report included the following information SBAR, Kardex, ED Summary, OR Summary, Procedure Summary, Intake/Output, MAR, Accordion, Recent Results, Med Rec Status, Cardiac Rhythm Paced, Alarm Parameters , Pre Procedure Checklist, Procedure Verification and Quality Measures.

## 2019-06-22 NOTE — PROGRESS NOTES
[address] [facility] in [place], [place] Inpatient Progress Note Patient name: Cornel Silverio Patient : 1950 Patient MRN: [id number] Attending MD: Deirdre Hatfield MD 
Date of service: 19 CHIEF COMPLAINT: 
Acute on chronic systolic heart failure HPI:  
Bell London is a 77 y/o pleasant white male with h/o HTN, HL, likey JOSE, CAD s/p cardiac arrest VFib s/p CABG () c/b sternal would infection and sternotomy, ischemic cardiomyopathy LVEF 15-20%, s/p ICD and with LBBB. Patient admitted with acute on chronic systolic heart failure with massive volume overload > 20 lbs, in the setting of atrial fibrillation s/p failed DCCV and new UTI now treated with IV antibiotics. He underwent DCCV and RHC on . He underwent BiVICD upgrade yesterday. His IV milrinone and IV bumex were discontinued this am. Will start oral diuretics and recheck his renal function in the morning, prior to discharge. Recent Events: S/p BiVICD upgrade ([employer])  
  
Plan:  
Discontinue milrinone 0.2 mcg/kg/min and bumex gtt 0.5 mg/hr until CRT upgrade Start torsemide 40 mg BID (holds fluid in his abdomen) Continue spironolactone 25mg daily Continue amiodarone for rate control Resume apixaban for [facility] Intolerant to ACE/ARB/ARNi due to renal dysfunction Check BMP, NT proBNP in am 
 
IMPRESSION: 
Fatigue Shortness of breath Volume overload Pulmonary edema Acute on chronic systolic heart failure Stage C, NYHA class IIIA symptoms Coronary artery disease S/p arrest VFib and subsequent CABG  Sternal wound infection requiring sternectomy Ischemic cardiomyopathy, LVEF 20% 10/18 Atrial fibrillation s/p failed DCCV 6/10/19, s/p successful DCCV 19 Chronic anticoagulation with eliquis S/p BiVICD 19 LBBB,  ms Acute on CKD, stage 3 
UTI Urinary retension Anemia, iron deficiency H/o DVT Migraines JACQUE on 19 showed thrombus H/o tobacco abuse Nighttime desaturations, likely JOSE Body mass index is 27.99 kg/m². CARDIAC IMAGING: 
Echo (5/31/19) LVEF 21-25%, severely dilated LA, moderately dilated RA 
EKG (6/12/19) atrial flutter with occasional PVCs, LBBB QRS 158ms LHC not in epic 
  
HEMODYNAMICS: 
CPEST not done 6MW not done 
  
OTHER IMAGING: 
CT chest (5/31/19) 1. No evidence of pulmonary embolus. 2. Interstitial edema and small bilateral pleural effusions. 3. Mild mediastinal adenopathy. 
  
General: Alert, oriented, no distress Cardiovascular: NSR, S1 S2, Pulm: Adequate oxygenation on RA, no distress Abdomen: Soft, no mass or tenderness. No organomegaly or hernia. Bowel sounds present. Genitourinary and rectal: deferred Extremities: No cyanosis, clubbing, or edema. Neurologic: No focal sensory or motor deficits are noted. Grossly intact. Psychiatric: Awake, alert an doriented x 3. Appropriate mood and affect. Skin: Warm, dry and well perfused. No lesions, nodules or rashes are noted. REVIEW OF SYSTEMS: 
General: Denies fever, night sweats. Ear, nose and throat: Denies difficulty hearing, sinus problems, runny nose, post-nasal drip, ringing in ears, mouth sores, loose teeth, ear pain, nosebleeds, sore throate, facial pain or numbess Cardiovascular: see above in the interval history Respiratory: Denies cough, wheezing, sputum production, hemoptysis. Dyspnea improved Gastrointestinal: Denies heartburn, constipation, intolerance to certain foods, diarrhea, abdominal pain, nausea, vomiting, difficulty swallowing, blood in stool Kidney and bladder: Denies painful urination, frequent urination, urgency, prostate problems and impotence Musculoskeletal: Denies joint pain, muscle weakness Skin and hair: Denies change in existing skin lesions, hair loss or increase, breast changes PAST MEDICAL HISTORY: 
Past Medical History:  
Diagnosis Date  Degenerative disc disease, lumbar  Heart failure ([facility] [address]  High cholesterol  Hypertension  [facility]  Spinal stenosis PAST SURGICAL HISTORY: 
Past Surgical History:  
Procedure Laterality Date  HX APPENDECTOMY  HX CORONARY ARTERY BYPASS GRAFT    
 triple  HX HERNIA REPAIR    
 HX IMPLANTABLE CARDIOVERTER DEFIBRILLATOR  ND CARDIOVERSION ELECTIVE ARRHYTHMIA EXTERNAL N/A 6/10/2019 EP CARDIOVERSION performed by Patrice Hodge MD at [address], [facility]  CATH LAB  ND CARDIOVERSION ELECTIVE ARRHYTHMIA EXTERNAL N/A 2019 EP CARDIOVERSION performed by Diego Juarez MD at [address], [facility] CATH LAB FAMILY HISTORY: 
History reviewed. No pertinent family history. SOCIAL HISTORY: 
Social History Socioeconomic History  Marital status:  Spouse name: Not on file  Number of children: Not on file  Years of education: Not on file  Highest education level: Not on file Tobacco Use  Smoking status: Former Smoker Last attempt to quit: 2010 Years since quittin.5  Smokeless tobacco: Never Used Substance and Sexual Activity  Alcohol use: Yes Comment: rarely  Drug use: Never LABORATORY RESULTS: 
  
Labs Latest Ref Rng & Units 2019 WBC 4.1 - 11.1 K/uL - 6.3 5.3 6.1 5.2 5.8 5.7  
RBC 4.10 - 5.70 M/uL - 4.07(L) 3.76(L) 3.82(L) 3.63(L) 3.59(L) 3.83(L) Hemoglobin 12.1 - 17.0 g/dL - 12.3 11. 3(L) 11. 6(L) 10. 9(L) 10. 8(L) 11. 6(L) Hematocrit 36.6 - 50.3 % - 37.3 34. 8(L) 35. 8(L) 33. 8(L) 33. 2(L) 35. 6(L) MCV 80.0 - 99.0 FL - 91.6 92.6 93.7 93.1 92.5 93.0 Platelets 271 - 063 K/uL - 261 246 286 287 296 267 Lymphocytes 12 - 49 % - - 14 14 - - - Monocytes 5 - 13 % - - 4(L) 8 - - - Eosinophils 0 - 7 % - - 1 3 - - - Basophils 0 - 1 % - - 0 1 - - - Bands 0 - 6 % - - - - - - - Albumin 3.5 - 5.0 g/dL - - - 3. 1(L) 2. 8(L) 2. 7(L) 2. 7(L) Calcium 8.5 - 10.1 MG/DL 9.3 9.4 8.9 8.8 9.1 9.1 9.0 SGOT 15 - 37 U/L - - - 17 16 14(L) 15 Glucose 65 - 100 mg/dL 95 106(H) 96 117(H) 98 95 92 BUN 6 - 20 MG/DL 31(H) 32(H) 32(H) 31(H) 34(H) 32(H) 31(H) Creatinine 0.70 - 1.30 MG/DL 2.05(H) 2.14(H) 2.10(H) 2.20(H) 2.21(H) 2.13(H) 1.93(H) Sodium 136 - 145 mmol/L 132(L) 132(L) 135(L) 135(L) 135(L) 136 137 Potassium 3.5 - 5.1 mmol/L 3.6 3.7 3.7 3. 4(L) 3.9 3.8 4.2 TSH 0.36 - 3.74 uIU/mL - - - - - - - Some recent data might be hidden Lab Results Component Value Date/Time TSH 2.45 06/01/2019 04:16 AM  
 
 
CURRENT MEDICATIONS: 
 
Current Facility-Administered Medications:  
  apixaban (ELIQUIS) tablet 5 mg, 5 mg, Oral, BID, Felicia Mason MD 
  potassium chloride SR (KLOR-CON 10) tablet 20 mEq, 20 mEq, Oral, NOW, Mounika Altman MD 
  sodium chloride (NS) flush 5-40 mL, 5-40 mL, IntraVENous, Q8H, Wilber Kay MD, 10 mL at 06/22/19 0617 
  sodium chloride (NS) flush 5-40 mL, 5-40 mL, IntraVENous, PRN, Wilber Kay MD 
  oxyCODONE-acetaminophen (PERCOCET) 5-325 mg per tablet 1 Tab, 1 Tab, Oral, Q4H PRN, Wilber Kay MD 
  carvedilol (COREG) tablet 6.25 mg, 6.25 mg, Oral, BID WITH MEALS, Wilber Kay MD, 6.25 mg at 06/22/19 0844 
  evolocumab (REPATHA SURECLICK) pen injection 689 mg (Patient Supplied), 140 mg, SubCUTAneous, Q 14 DAYS, Wilber Kay MD, 140 mg at 06/14/19 1735   spironolactone (ALDACTONE) tablet 25 mg, 25 mg, Oral, DAILY, Wilber Kay MD, 25 mg at 06/22/19 0844 
  melatonin tablet 3 mg, 3 mg, Oral, QHS PRN, Wilber Kay MD, 3 mg at 06/09/19 2125 
  amiodarone (CORDARONE) tablet 200 mg, 200 mg, Oral, BID, Wilber Kay MD, 200 mg at 06/22/19 6856   aspirin delayed-release tablet 81 mg, 81 mg, Oral, DAILY, Wilber Kay MD, 81 mg at 06/22/19 5887   sodium chloride (NS) flush 5-40 mL, 5-40 mL, IntraVENous, Q8H, James Cr MD, 10 mL at 06/21/19 1810 
  sodium chloride (NS) flush 5-40 mL, 5-40 mL, IntraVENous, PRN, James Cr MD, 10 mL at 06/03/19 1532   ondansetron (ZOFRAN) injection 4 mg, 4 mg, IntraVENous, Q4H PRN, aJmes Cr MD 
  bisacodyl (DULCOLAX) tablet 5 mg, 5 mg, Oral, DAILY PRN, James Cr MD 
  acetaminophen (TYLENOL) tablet 650 mg, 650 mg, Oral, Q4H PRN, James Cr MD, 650 mg at 06/19/19 1701   morphine injection 2 mg, 2 mg, IntraVENous, Q4H PRN, James Cr MD 
 
 
Thank you for allowing us to participate in your patient's care. Mounika Cee MD, Paz Ordaz Chief of Cardiology, [facility] Medical Director [facility] [address], [address] [place] [address] Office [phone] Fax [phone]

## 2019-06-22 NOTE — PROGRESS NOTES
Covering for Dr Leigh Rodarte Mr Evonne Carrel did well overnight. His ICD site looks fine. No bleeding, drainage, swelling or tenderness. His dressing is intact. Ok to restart his apixaban.

## 2019-06-22 NOTE — PROGRESS NOTES
Hospitalist Progress Note Zhang Martin MD 
Please call  and page for questions. Call physician on-call through the  7pm-7am 
 
Daily Progress Note: 6/22/2019 Primary care Stephy Gleason MD 
 
Date of admission: 5/31/2019  7:03 PM 
 
Admission summery and hospital course: 
60-year-old man with past medical history significant for chronic systolic congestive heart failure, dyslipidemia, hypertension, atrial fibrillation status post cardioversion, was in his usual state of health until the day of presentation at the emergency room when the patient developed worsening of his shortness of breath.  The inpatient underwent cardioversion early this morning by his cardiologist. 
Shermon Abbot started on 06/05. 
  
Subjective:  
 
Patient is seen and examined at bedside. He had pacemaker upgrade yesterday, tolerated well. Feels ok. No new complaints. Assessment/Plan:  
Acute-on-chronic systolic congestive heart failure NYHA 2-3 - improving JACQUE on 05/31 with EF of 21%-25%.      
Continue coreg CTA of the chest is negative for pulmonary embolism.  Appreciated cardiology input. AHF team on board Discontinuing  milrinone and bumex drip 6/22 RHC done 6/18 - elevated pressures C/w po aldactone Saturating on RA Started on Torsemide 40mg bid today 
  
Atrial fibrillation, status post cardioversion 6/18   
Aflutter - reverted to afib 
- he had cardioversions in the past 
- c/w amiodarone. Eliquis restarted LBBB - upgraded to BiV ICD/pacer 6/22.   
 
Hypertension. stable . C/w coreg Acute kidney injury on CKD   
Creatinine stable -  Most likely as a result due to diuresis  
- Renal US: 06/05 was normal.  
- avoid nephrotoxins - will monitor Hypokalemia- resolved Anemia-iron deficient - hb stable UTI:  
Afebrile. Completed Rocephin.  
Ucx: Enterobacter Cloacae Urinary retention- resolved, off neal Insomnia: melatonin. 
  
 VTE prophylaxis: Eliquis on hold   
Code status: Full Discussed plan of care with Patient/Family and Nurse.  
Pre-admission lived at home. Discharge planning: TBD. outpt cardiac rehab Review of Systems:  
 
Review of Systems: 
Symptom  Y/N  Comments   Symptom  Y/N  Comments Fever/Chills   n   Chest Pain  n   
Poor Appetite  n    Edema  n    
Cough  n   Abdominal Pain  n    
Sputum  n   Joint Pain SOB/DOUGLAS  y Improved   Pruritis/Rash Nausea/vomit  n   Tolerating PT/OT Diarrhea     Tolerating Diet  y Constipation     Other Could not obtain due to:    
 
 
Objective:  
Physical Exam:  
 
Visit Vitals /78 (BP 1 Location: Right arm, BP Patient Position: At rest) Pulse 79 Temp 98.8 °F (37.1 °C) Resp 20 Wt 90.5 kg (199 lb 9.6 oz) SpO2 98% BMI 25.63 kg/m² O2 Flow Rate (L/min): 2 l/min O2 Device: Room air Temp (24hrs), Av.3 °F (36.8 °C), Min:97.5 °F (36.4 °C), Max:98.8 °F (37.1 °C) 
  701 - 1900 In: 240 [P.O.:240] Out: -    1901 -  0700 In: 5754 [P.O.:1040; I.V.:57] Out: 9801 [CROFI:8488] General:  Alert, cooperative, no distress, appears stated age. Lungs:     clear eldon bases Heart:  Regular rate and rhythm, S1, S2 normal, no murmur.   
Abdomen:   Soft, non-tender. Bowel sounds normal.   
Extremities: Extremities normal, atraumatic, no cyanosis. Edema at LEs. Skin: Skin color, texture, turgor normal. No rashes or lesions Neurologic: Grossly normal.   
  
Data Review:  
   
Recent Days: 
Recent Labs  
  19 
0449 19 
0114 WBC 6.3 5.3 HGB 12.3 11.3* HCT 37.3 34.8*  
 246 Recent Labs  
  19 
0400 19 
0449 19 
0114 * 132* 135* K 3.6 3.7 3.7 CL 91* 94* 96* CO2 32 32 34* GLU 95 106* 96 BUN 31* 32* 32* CREA 2.05* 2.14* 2.10* CA 9.3 9.4 8.9 MG  --   --  2.2 No results for input(s): PH, PCO2, PO2, HCO3, FIO2 in the last 72 hours. 24 Hour Results: Recent Results (from the past 24 hour(s)) METABOLIC PANEL, BASIC Collection Time: 06/22/19  4:00 AM  
Result Value Ref Range Sodium 132 (L) 136 - 145 mmol/L Potassium 3.6 3.5 - 5.1 mmol/L Chloride 91 (L) 97 - 108 mmol/L  
 CO2 32 21 - 32 mmol/L Anion gap 9 5 - 15 mmol/L Glucose 95 65 - 100 mg/dL BUN 31 (H) 6 - 20 MG/DL Creatinine 2.05 (H) 0.70 - 1.30 MG/DL  
 BUN/Creatinine ratio 15 12 - 20 GFR est AA 39 (L) >60 ml/min/1.73m2 GFR est non-AA 32 (L) >60 ml/min/1.73m2 Calcium 9.3 8.5 - 10.1 MG/DL  
NT-PRO BNP Collection Time: 06/22/19  4:00 AM  
Result Value Ref Range NT pro-BNP 1,904 (H) <125 PG/ML Problem List: 
Problem List as of 6/22/2019 Date Reviewed: 6/10/2019 Codes Class Noted - Resolved * (Principal) Acute on chronic systolic CHF (congestive heart failure) (HCC) ICD-10-CM: K89.45 ICD-9-CM: 428.23, 428.0  5/31/2019 - Present Paroxysmal atrial fibrillation (HCC) ICD-10-CM: I48.0 ICD-9-CM: 427.31  4/2/2019 - Present Medications reviewed Current Facility-Administered Medications Medication Dose Route Frequency  apixaban (ELIQUIS) tablet 5 mg  5 mg Oral BID  torsemide (DEMADEX) tablet 40 mg  40 mg Oral BID  sodium chloride (NS) flush 5-40 mL  5-40 mL IntraVENous Q8H  
 sodium chloride (NS) flush 5-40 mL  5-40 mL IntraVENous PRN  
 oxyCODONE-acetaminophen (PERCOCET) 5-325 mg per tablet 1 Tab  1 Tab Oral Q4H PRN  
 carvedilol (COREG) tablet 6.25 mg  6.25 mg Oral BID WITH MEALS  evolocumab (REPATHA SURECLICK) pen injection 243 mg (Patient Supplied)  140 mg SubCUTAneous Q 14 DAYS  spironolactone (ALDACTONE) tablet 25 mg  25 mg Oral DAILY  melatonin tablet 3 mg  3 mg Oral QHS PRN  
 amiodarone (CORDARONE) tablet 200 mg  200 mg Oral BID  aspirin delayed-release tablet 81 mg  81 mg Oral DAILY  sodium chloride (NS) flush 5-40 mL  5-40 mL IntraVENous Q8H  
  sodium chloride (NS) flush 5-40 mL  5-40 mL IntraVENous PRN  
 ondansetron (ZOFRAN) injection 4 mg  4 mg IntraVENous Q4H PRN  
 bisacodyl (DULCOLAX) tablet 5 mg  5 mg Oral DAILY PRN  
 acetaminophen (TYLENOL) tablet 650 mg  650 mg Oral Q4H PRN  
 morphine injection 2 mg  2 mg IntraVENous Q4H PRN Care Plan discussed with: Patient/Family and Nurse Total time spent with patient: 30 minutes.  
 
Kristene Goodpasture, MD

## 2019-06-23 LAB
ANION GAP SERPL CALC-SCNC: 10 MMOL/L (ref 5–15)
BASOPHILS # BLD: 0.1 K/UL (ref 0–0.1)
BASOPHILS NFR BLD: 1 % (ref 0–1)
BNP SERPL-MCNC: 3199 PG/ML
BUN SERPL-MCNC: 36 MG/DL (ref 6–20)
BUN/CREAT SERPL: 15 (ref 12–20)
CALCIUM SERPL-MCNC: 9.6 MG/DL (ref 8.5–10.1)
CHLORIDE SERPL-SCNC: 92 MMOL/L (ref 97–108)
CO2 SERPL-SCNC: 29 MMOL/L (ref 21–32)
CREAT SERPL-MCNC: 2.37 MG/DL (ref 0.7–1.3)
DIFFERENTIAL METHOD BLD: ABNORMAL
EOSINOPHIL # BLD: 0.1 K/UL (ref 0–0.4)
EOSINOPHIL NFR BLD: 1 % (ref 0–7)
ERYTHROCYTE [DISTWIDTH] IN BLOOD BY AUTOMATED COUNT: 13.8 % (ref 11.5–14.5)
GLUCOSE SERPL-MCNC: 209 MG/DL (ref 65–100)
HCT VFR BLD AUTO: 37.7 % (ref 36.6–50.3)
HGB BLD-MCNC: 12.7 G/DL (ref 12.1–17)
IMM GRANULOCYTES # BLD AUTO: 0 K/UL (ref 0–0.04)
IMM GRANULOCYTES NFR BLD AUTO: 1 % (ref 0–0.5)
LYMPHOCYTES # BLD: 1.1 K/UL (ref 0.8–3.5)
LYMPHOCYTES NFR BLD: 14 % (ref 12–49)
MAGNESIUM SERPL-MCNC: 2.3 MG/DL (ref 1.6–2.4)
MCH RBC QN AUTO: 30.5 PG (ref 26–34)
MCHC RBC AUTO-ENTMCNC: 33.7 G/DL (ref 30–36.5)
MCV RBC AUTO: 90.6 FL (ref 80–99)
MONOCYTES # BLD: 1 K/UL (ref 0–1)
MONOCYTES NFR BLD: 13 % (ref 5–13)
NEUTS SEG # BLD: 5.7 K/UL (ref 1.8–8)
NEUTS SEG NFR BLD: 70 % (ref 32–75)
NRBC # BLD: 0 K/UL (ref 0–0.01)
NRBC BLD-RTO: 0 PER 100 WBC
PLATELET # BLD AUTO: 207 K/UL (ref 150–400)
PMV BLD AUTO: 9.2 FL (ref 8.9–12.9)
POTASSIUM SERPL-SCNC: 3.6 MMOL/L (ref 3.5–5.1)
RBC # BLD AUTO: 4.16 M/UL (ref 4.1–5.7)
SODIUM SERPL-SCNC: 131 MMOL/L (ref 136–145)
WBC # BLD AUTO: 7.9 K/UL (ref 4.1–11.1)

## 2019-06-23 PROCEDURE — 83735 ASSAY OF MAGNESIUM: CPT

## 2019-06-23 PROCEDURE — 74011250637 HC RX REV CODE- 250/637: Performed by: INTERNAL MEDICINE

## 2019-06-23 PROCEDURE — 80048 BASIC METABOLIC PNL TOTAL CA: CPT

## 2019-06-23 PROCEDURE — 65660000000 HC RM CCU STEPDOWN

## 2019-06-23 PROCEDURE — 83880 ASSAY OF NATRIURETIC PEPTIDE: CPT

## 2019-06-23 PROCEDURE — 36415 COLL VENOUS BLD VENIPUNCTURE: CPT

## 2019-06-23 PROCEDURE — 99233 SBSQ HOSP IP/OBS HIGH 50: CPT | Performed by: INTERNAL MEDICINE

## 2019-06-23 PROCEDURE — 85025 COMPLETE CBC W/AUTO DIFF WBC: CPT

## 2019-06-23 RX ADMIN — Medication 5 ML: at 14:00

## 2019-06-23 RX ADMIN — CARVEDILOL 6.25 MG: 6.25 TABLET, FILM COATED ORAL at 08:28

## 2019-06-23 RX ADMIN — TORSEMIDE 40 MG: 20 TABLET ORAL at 18:12

## 2019-06-23 RX ADMIN — TORSEMIDE 40 MG: 20 TABLET ORAL at 08:28

## 2019-06-23 RX ADMIN — AMIODARONE HYDROCHLORIDE 200 MG: 200 TABLET ORAL at 18:12

## 2019-06-23 RX ADMIN — APIXABAN 5 MG: 5 TABLET, FILM COATED ORAL at 08:29

## 2019-06-23 RX ADMIN — CARVEDILOL 6.25 MG: 6.25 TABLET, FILM COATED ORAL at 18:12

## 2019-06-23 RX ADMIN — SPIRONOLACTONE 25 MG: 25 TABLET ORAL at 08:29

## 2019-06-23 RX ADMIN — APIXABAN 5 MG: 5 TABLET, FILM COATED ORAL at 18:12

## 2019-06-23 RX ADMIN — AMIODARONE HYDROCHLORIDE 200 MG: 200 TABLET ORAL at 08:28

## 2019-06-23 RX ADMIN — ASPIRIN 81 MG: 81 TABLET ORAL at 08:28

## 2019-06-23 NOTE — PROGRESS NOTES
Problem: Heart Failure: Day 5 Goal: Medications Outcome: Progressing Towards Goal 
Note:  
Pt on po diuretics,vitals stable,monitor I's and O's. On beta blocker. Problem: Heart Failure: Discharge Outcomes Goal: *Demonstrates ability to perform prescribed activity without shortness of breath or discomfort Outcome: Progressing Towards Goal 
  
Problem: Falls - Risk of 
Goal: *Absence of Falls Description Document Asim Rollins Fall Risk and appropriate interventions in the flowsheet.  
Outcome: Progressing Towards Goal

## 2019-06-23 NOTE — PROGRESS NOTES
[address] [facility] [address] [facility] in [place], [place] Inpatient Progress Note Patient name: Jimmy Javier Patient : 1950 Patient MRN: [id number] Attending MD: Dave Forrester MD 
Date of service: 19 CHIEF COMPLAINT: 
Acute on chronic systolic heart failure HPI:  
Dano Self is a 75 y/o pleasant white male with h/o HTN, HL, likey JOSE, CAD s/p cardiac arrest VFib s/p CABG () c/b sternal would infection and sternotomy, ischemic cardiomyopathy LVEF 15-20%, s/p ICD and with LBBB. Patient admitted with acute on chronic systolic heart failure with massive volume overload > 20 lbs, in the setting of atrial fibrillation s/p failed DCCV and new UTI now treated with IV antibiotics. He underwent DCCV and RHC on . He underwent BiVICD upgrade yesterday. His IV milrinone and IV bumex were discontinued this am. Will start oral diuretics and recheck his renal function in the morning, prior to discharge. Recent Events: POD 2 - BiVICD upgrade ([employer]) IV milrinone discontinued yesterday More dyspneic today - on nasal cannula oxygen Serum Cr increased from 2.02 to 2.37 NT proBNP increased from 1904 to 3199 
  
Plan:  
Continue torsemide 40 mg BID (holds fluid in his abdomen) Continue spironolactone 25mg daily Continue amiodarone for rate control Resume apixaban for [facility] Intolerant to ACE/ARB/ARNi due to renal dysfunction Trend renal function, NT proBNP, symptom Discuss with Dr. Irlanda Oliver in am - ? Concerned about worsening renal function and worsening dyspnea off IV milrinone IMPRESSION: 
Fatigue Shortness of breath Volume overload Pulmonary edema Acute on chronic systolic heart failure Stage C, NYHA class IVsymptoms Coronary artery disease S/p arrest VFib and subsequent CABG  Sternal wound infection requiring sternectomy Ischemic cardiomyopathy, LVEF 20% 10/18 Atrial fibrillation s/p failed DCCV 6/10/19, s/p successful DCCV 19 Chronic anticoagulation with eliquis S/p BiVICD 6/21/19 Acute on CKD, stage 3 
UTI Urinary retension Anemia, iron deficiency H/o DVT Migraines JACQUE on 2/4/19 showed thrombus H/o tobacco abuse Nighttime desaturations, likely JOSE Body mass index is 27.99 kg/m². CARDIAC IMAGING: 
Echo (5/31/19) LVEF 21-25%, severely dilated LA, moderately dilated RA 
EKG (6/12/19) atrial flutter with occasional PVCs, LBBB QRS 158ms [facility] not in epic 
  
HEMODYNAMICS: 
CPEST not done 6MW not done 
  
OTHER IMAGING: 
CT chest (5/31/19) 1. No evidence of pulmonary embolus. 2. Interstitial edema and small bilateral pleural effusions. 3. Mild mediastinal adenopathy. 
  
General: Alert, oriented, no distress Cardiovascular: NSR, S1 S2, Pulm: Adequate oxygenation on RA, no distress Abdomen: Soft, no mass or tenderness. No organomegaly or hernia. Bowel sounds present. Genitourinary and rectal: deferred Extremities: No cyanosis, clubbing, or edema. Neurologic: No focal sensory or motor deficits are noted. Grossly intact. Psychiatric: Awake, alert an doriented x 3. Appropriate mood and affect. Skin: Warm, dry and well perfused. No lesions, nodules or rashes are noted. REVIEW OF SYSTEMS: 
General: Denies fever, night sweats. Ear, nose and throat: Denies difficulty hearing, sinus problems, runny nose, post-nasal drip, ringing in ears, mouth sores, loose teeth, ear pain, nosebleeds, sore throate, facial pain or numbess Cardiovascular: see above in the interval history Respiratory: Denies cough, wheezing, sputum production, hemoptysis. Dyspnea improved Gastrointestinal: Denies heartburn, constipation, intolerance to certain foods, diarrhea, abdominal pain, nausea, vomiting, difficulty swallowing, blood in stool Kidney and bladder: Denies painful urination, frequent urination, urgency, prostate problems and impotence Musculoskeletal: Denies joint pain, muscle weakness Skin and hair: Denies change in existing skin lesions, hair loss or increase, breast changes PAST MEDICAL HISTORY: 
Past Medical History:  
Diagnosis Date  Degenerative disc disease, lumbar  Heart failure ([facility] [address] [facility]  High cholesterol  Hypertension  Paroxysmal atrial fibrillation ([facility] 2019  Spinal stenosis PAST SURGICAL HISTORY: 
Past Surgical History:  
Procedure Laterality Date  HX APPENDECTOMY  HX CORONARY ARTERY BYPASS GRAFT    
 triple  HX HERNIA REPAIR    
 HX IMPLANTABLE CARDIOVERTER DEFIBRILLATOR  OR CARDIOVERSION ELECTIVE ARRHYTHMIA EXTERNAL N/A 6/10/2019 EP CARDIOVERSION performed by Rober Yadav MD at [address], [facility]  CATH LAB  OR CARDIOVERSION ELECTIVE ARRHYTHMIA EXTERNAL N/A 2019 EP CARDIOVERSION performed by Christy Duffy MD at [address] [address] CATH LAB FAMILY HISTORY: 
History reviewed. No pertinent family history. SOCIAL HISTORY: 
Social History Socioeconomic History  Marital status:  Spouse name: Not on file  Number of children: Not on file  Years of education: Not on file  Highest education level: Not on file Tobacco Use  Smoking status: Former Smoker Last attempt to quit: 2010 Years since quittin.5  Smokeless tobacco: Never Used Substance and Sexual Activity  Alcohol use: Yes Comment: rarely  Drug use: Never LABORATORY RESULTS: 
  
Labs Latest Ref Rng & Units 2019 WBC 4.1 - 11.1 K/uL 7.9 - - 6.3 5.3 6.1 5.2  
RBC 4.10 - 5.70 M/uL 4.16 - - 4.07(L) 3.76(L) 3.82(L) 3.63(L) Hemoglobin 12.1 - 17.0 g/dL 12.7 - - 12.3 11. 3(L) 11. 6(L) 10. 9(L) Hematocrit 36.6 - 50.3 % 37.7 - - 37.3 34. 8(L) 35. 8(L) 33. 8(L) MCV 80.0 - 99.0 FL 90.6 - - 91.6 92.6 93.7 93.1 Platelets 279 - 484 K/uL 207 - - 261 246 286 287 Lymphocytes 12 - 49 % 14 - - - 14 14 - Monocytes 5 - 13 % 13 - - - 4(L) 8 -  
 Eosinophils 0 - 7 % 1 - - - 1 3 - Basophils 0 - 1 % 1 - - - 0 1 -  
Bands 0 - 6 % - - - - - - - Albumin 3.5 - 5.0 g/dL - 3. 3(L) - - - 3. 1(L) 2. 8(L) Calcium 8.5 - 10.1 MG/DL 9.6 9.3 9.3 9.4 8.9 8.8 9.1 SGOT 15 - 37 U/L - 25 - - - 17 16 Glucose 65 - 100 mg/dL 209(H) 88 95 106(H) 96 117(H) 98 BUN 6 - 20 MG/DL 36(H) 31(H) 31(H) 32(H) 32(H) 31(H) 34(H) Creatinine 0.70 - 1.30 MG/DL 2.37(H) 2.02(H) 2.05(H) 2.14(H) 2.10(H) 2.20(H) 2.21(H) Sodium 136 - 145 mmol/L 131(L) 133(L) 132(L) 132(L) 135(L) 135(L) 135(L) Potassium 3.5 - 5.1 mmol/L 3.6 3.7 3.6 3.7 3.7 3. 4(L) 3.9 TSH 0.36 - 3.74 uIU/mL - - - - - - - Some recent data might be hidden Lab Results Component Value Date/Time TSH 2.45 06/01/2019 04:16 AM  
 
 
CURRENT MEDICATIONS: 
 
Current Facility-Administered Medications:  
  apixaban (ELIQUIS) tablet 5 mg, 5 mg, Oral, BID, Alexa Ma MD, 5 mg at 06/23/19 1483   torsemide (DEMADEX) tablet 40 mg, 40 mg, Oral, BID, Mounika Altman MD, 40 mg at 06/23/19 9817   sodium chloride (NS) flush 5-40 mL, 5-40 mL, IntraVENous, Q8H, Sisi Harrell MD, 10 mL at 06/22/19 1438   sodium chloride (NS) flush 5-40 mL, 5-40 mL, IntraVENous, PRN, Sisi Harrell MD 
  oxyCODONE-acetaminophen (PERCOCET) 5-325 mg per tablet 1 Tab, 1 Tab, Oral, Q4H PRN, Sisi Harrell MD 
  carvedilol (COREG) tablet 6.25 mg, 6.25 mg, Oral, BID WITH MEALS, Sisi Harrell MD, 6.25 mg at 06/23/19 7103   evolocumab (REPATHA SURECLICK) pen injection 764 mg (Patient Supplied), 140 mg, SubCUTAneous, Q 14 DAYS, Sisi Harrell MD, 140 mg at 06/14/19 1735   spironolactone (ALDACTONE) tablet 25 mg, 25 mg, Oral, DAILY, Sisi Harrell MD, 25 mg at 06/23/19 0187 
  melatonin tablet 3 mg, 3 mg, Oral, QHS PRN, Sisi Harrell MD, 3 mg at 06/09/19 5255 
  amiodarone (CORDARONE) tablet 200 mg, 200 mg, Oral, BID, Sisi Harrell MD, 200 mg at 06/23/19 0722   aspirin delayed-release tablet 81 mg, 81 mg, Oral, DAILY, Marii Rey MD, 81 mg at 06/23/19 0456   sodium chloride (NS) flush 5-40 mL, 5-40 mL, IntraVENous, Q8H, Marii Rey MD, 10 mL at 06/22/19 1438   sodium chloride (NS) flush 5-40 mL, 5-40 mL, IntraVENous, PRN, Marii Rey MD, 10 mL at 06/03/19 4211   ondansetron (ZOFRAN) injection 4 mg, 4 mg, IntraVENous, Q4H PRN, Marii Rey MD 
  bisacodyl (DULCOLAX) tablet 5 mg, 5 mg, Oral, DAILY PRN, Marii Rey MD 
  acetaminophen (TYLENOL) tablet 650 mg, 650 mg, Oral, Q4H PRN, Marii Rey MD, 650 mg at 06/19/19 1701   morphine injection 2 mg, 2 mg, IntraVENous, Q4H PRN, Marii Rey MD 
 
Thank you for allowing us to participate in your patient's care. Mounika Sellers MD, Cheryl Alvarado Chief of Cardiology, [facility] Medical Director [facility] [address], [address] [facility], [address] [place] [address] Office [phone] Fax [phone]

## 2019-06-23 NOTE — PROGRESS NOTES
Hospitalist Progress Note Sergo Kerns MD 
Please call  and page for questions. Call physician on-call through the  7pm-7am 
 
Daily Progress Note: 6/23/2019 Primary care Delmer Denney MD 
 
Date of admission: 5/31/2019  7:03 PM 
 
Admission summery and hospital course: 
70-year-old man with past medical history significant for chronic systolic congestive heart failure, dyslipidemia, hypertension, atrial fibrillation status post cardioversion, was in his usual state of health until the day of presentation at the emergency room when the patient developed worsening of his shortness of breath.  The inpatient underwent cardioversion early this morning by his cardiologist. 
Sharlon Peper started on 06/05. 
  
Subjective:  
 
Patient is seen and examined at bedside. He feels ok. No new complaints. Wants to go home. Discussed with nursing regarding AM labs  - bmp not done. Assessment/Plan:  
Acute-on-chronic systolic congestive heart failure NYHA 2-3 - improving JACQUE on 05/31 with EF of 21%-25%.      
Continue coreg CTA of the chest is negative for pulmonary embolism.  Appreciated cardiology input. AHF team on board Discontinuing  milrinone and bumex drip 6/22 RHC done 6/18 - elevated pressures C/w po aldactone Saturating on RA 
C/w Torsemide 40mg bid Discussed with Dr. Rona Joaquin: elevated cr and pro BNP today, will monitor for one more day.  
  
Atrial fibrillation, status post cardioversion 6/18   
Aflutter - reverted to afib 
- he had cardioversions in the past 
- c/w amiodarone. Eliquis restarted LBBB - upgraded to BiV ICD/pacer 6/22.   
 
Hypertension. stable . C/w coreg Acute kidney injury on CKD   
Creatinine worsened -  Most likely as a result due to diuresis  
- Renal US: 06/05 was normal.  
- avoid nephrotoxins - will monitor Hypokalemia- resolved Anemia-iron deficient - hb stable UTI:  
 Afebrile. Completed Rocephin.  
Ucx: Enterobacter Cloacae Urinary retention- resolved, off neal Insomnia: melatonin. 
  
VTE prophylaxis: Eliquis Code status: Full Discussed plan of care with Patient/Family and Nurse.  
Pre-admission lived at home. Discharge planning: TBD. outpt cardiac rehab. Possible dc tomorrow. Review of Systems:  
 
Review of Systems: 
Symptom  Y/N  Comments   Symptom  Y/N  Comments Fever/Chills   n   Chest Pain  n   
Poor Appetite  n    Edema  n    
Cough  n   Abdominal Pain  n    
Sputum  n   Joint Pain SOB/DOUGLAS  y Improved   Pruritis/Rash Nausea/vomit  n   Tolerating PT/OT Diarrhea     Tolerating Diet  y Constipation     Other Could not obtain due to:    
 
 
Objective:  
Physical Exam:  
 
Visit Vitals /74 Pulse 75 Temp 97.8 °F (36.6 °C) Resp 18 Wt 90.7 kg (199 lb 14.4 oz) SpO2 96% BMI 25.67 kg/m² O2 Flow Rate (L/min): 2 l/min O2 Device: Room air Temp (24hrs), Av.3 °F (36.8 °C), Min:97.8 °F (36.6 °C), Max:98.8 °F (37.1 °C) 
  701 - 1900 In: -  
Out: 650 [Urine:650]   1901 -  0700 In: 3480 [P.O.:1280; I.V.:25] Out: 3100 [Urine:3100] General:  Alert, cooperative, no distress, appears stated age. Lungs:     clear eldon bases Heart:  Regular rate and rhythm, S1, S2 normal, no murmur.   
Abdomen:   Soft, non-tender. Bowel sounds normal.   
Extremities: Extremities normal, atraumatic, no cyanosis. Edema at LEs. Skin: Skin color, texture, turgor normal. No rashes or lesions Neurologic: Grossly normal.   
  
Data Review:  
   
Recent Days: 
Recent Labs  
  19 
0422 19 
0449 WBC 7.9 6.3 HGB 12.7 12.3 HCT 37.7 37.3  261 Recent Labs  
  19 
0900 19 
0400 19 
0449 * 133*  132* 132* K 3.6 3.7  3.6 3.7 CL 92* 92*  91* 94* CO2 29 30  32 32 * 88  95 106* BUN 36* 31*  31* 32* CREA 2.37* 2.02*  2.05* 2.14* CA 9.6 9.3  9.3 9.4 MG  --  2.2  --   
ALB  --  3.3*  --   
SGOT  --  25  --   
ALT  --  23  -- No results for input(s): PH, PCO2, PO2, HCO3, FIO2 in the last 72 hours. 24 Hour Results: 
Recent Results (from the past 24 hour(s)) CBC WITH AUTOMATED DIFF Collection Time: 06/23/19  4:22 AM  
Result Value Ref Range WBC 7.9 4.1 - 11.1 K/uL  
 RBC 4.16 4.10 - 5.70 M/uL  
 HGB 12.7 12.1 - 17.0 g/dL HCT 37.7 36.6 - 50.3 % MCV 90.6 80.0 - 99.0 FL  
 MCH 30.5 26.0 - 34.0 PG  
 MCHC 33.7 30.0 - 36.5 g/dL  
 RDW 13.8 11.5 - 14.5 % PLATELET 933 777 - 455 K/uL MPV 9.2 8.9 - 12.9 FL  
 NRBC 0.0 0  WBC ABSOLUTE NRBC 0.00 0.00 - 0.01 K/uL NEUTROPHILS 70 32 - 75 % LYMPHOCYTES 14 12 - 49 % MONOCYTES 13 5 - 13 % EOSINOPHILS 1 0 - 7 % BASOPHILS 1 0 - 1 % IMMATURE GRANULOCYTES 1 (H) 0.0 - 0.5 % ABS. NEUTROPHILS 5.7 1.8 - 8.0 K/UL  
 ABS. LYMPHOCYTES 1.1 0.8 - 3.5 K/UL  
 ABS. MONOCYTES 1.0 0.0 - 1.0 K/UL  
 ABS. EOSINOPHILS 0.1 0.0 - 0.4 K/UL  
 ABS. BASOPHILS 0.1 0.0 - 0.1 K/UL  
 ABS. IMM. GRANS. 0.0 0.00 - 0.04 K/UL  
 DF AUTOMATED METABOLIC PANEL, BASIC Collection Time: 06/23/19  9:00 AM  
Result Value Ref Range Sodium 131 (L) 136 - 145 mmol/L Potassium 3.6 3.5 - 5.1 mmol/L Chloride 92 (L) 97 - 108 mmol/L  
 CO2 29 21 - 32 mmol/L Anion gap 10 5 - 15 mmol/L Glucose 209 (H) 65 - 100 mg/dL BUN 36 (H) 6 - 20 MG/DL Creatinine 2.37 (H) 0.70 - 1.30 MG/DL  
 BUN/Creatinine ratio 15 12 - 20 GFR est AA 33 (L) >60 ml/min/1.73m2 GFR est non-AA 27 (L) >60 ml/min/1.73m2 Calcium 9.6 8.5 - 10.1 MG/DL  
NT-PRO BNP Collection Time: 06/23/19  9:00 AM  
Result Value Ref Range NT pro-BNP 3,199 (H) <125 PG/ML Problem List: 
Problem List as of 6/23/2019 Date Reviewed: 6/10/2019 Codes Class Noted - Resolved  * (Principal) Acute on chronic systolic CHF (congestive heart failure) (HCC) ICD-10-CM: B73.14 
 ICD-9-CM: 428.23, 428.0  5/31/2019 - Present Paroxysmal atrial fibrillation (HCC) ICD-10-CM: I48.0 ICD-9-CM: 427.31  4/2/2019 - Present Medications reviewed Current Facility-Administered Medications Medication Dose Route Frequency  apixaban (ELIQUIS) tablet 5 mg  5 mg Oral BID  torsemide (DEMADEX) tablet 40 mg  40 mg Oral BID  sodium chloride (NS) flush 5-40 mL  5-40 mL IntraVENous Q8H  
 sodium chloride (NS) flush 5-40 mL  5-40 mL IntraVENous PRN  
 oxyCODONE-acetaminophen (PERCOCET) 5-325 mg per tablet 1 Tab  1 Tab Oral Q4H PRN  
 carvedilol (COREG) tablet 6.25 mg  6.25 mg Oral BID WITH MEALS  evolocumab (REPATHA SURECLICK) pen injection 412 mg (Patient Supplied)  140 mg SubCUTAneous Q 14 DAYS  spironolactone (ALDACTONE) tablet 25 mg  25 mg Oral DAILY  melatonin tablet 3 mg  3 mg Oral QHS PRN  
 amiodarone (CORDARONE) tablet 200 mg  200 mg Oral BID  aspirin delayed-release tablet 81 mg  81 mg Oral DAILY  sodium chloride (NS) flush 5-40 mL  5-40 mL IntraVENous Q8H  
 sodium chloride (NS) flush 5-40 mL  5-40 mL IntraVENous PRN  
 ondansetron (ZOFRAN) injection 4 mg  4 mg IntraVENous Q4H PRN  
 bisacodyl (DULCOLAX) tablet 5 mg  5 mg Oral DAILY PRN  
 acetaminophen (TYLENOL) tablet 650 mg  650 mg Oral Q4H PRN  
 morphine injection 2 mg  2 mg IntraVENous Q4H PRN Care Plan discussed with: Patient/Family and Nurse Total time spent with patient: 30 minutes.  
 
Jose Doyle MD

## 2019-06-24 ENCOUNTER — APPOINTMENT (OUTPATIENT)
Dept: GENERAL RADIOLOGY | Age: 69
DRG: 222 | End: 2019-06-24
Attending: INTERNAL MEDICINE
Payer: MEDICARE

## 2019-06-24 ENCOUNTER — APPOINTMENT (OUTPATIENT)
Dept: NON INVASIVE DIAGNOSTICS | Age: 69
DRG: 222 | End: 2019-06-24
Attending: NURSE PRACTITIONER
Payer: MEDICARE

## 2019-06-24 LAB
ALBUMIN SERPL-MCNC: 3.1 G/DL (ref 3.5–5)
ALBUMIN/GLOB SERPL: 0.8 {RATIO} (ref 1.1–2.2)
ALP SERPL-CCNC: 93 U/L (ref 45–117)
ALT SERPL-CCNC: 12 U/L (ref 12–78)
ANION GAP SERPL CALC-SCNC: 7 MMOL/L (ref 5–15)
AST SERPL-CCNC: 12 U/L (ref 15–37)
BILIRUB SERPL-MCNC: 0.6 MG/DL (ref 0.2–1)
BNP SERPL-MCNC: 3795 PG/ML
BUN SERPL-MCNC: 39 MG/DL (ref 6–20)
BUN/CREAT SERPL: 17 (ref 12–20)
CALCIUM SERPL-MCNC: 9.2 MG/DL (ref 8.5–10.1)
CHLORIDE SERPL-SCNC: 93 MMOL/L (ref 97–108)
CO2 SERPL-SCNC: 32 MMOL/L (ref 21–32)
CREAT SERPL-MCNC: 2.23 MG/DL (ref 0.7–1.3)
ERYTHROCYTE [DISTWIDTH] IN BLOOD BY AUTOMATED COUNT: 13.9 % (ref 11.5–14.5)
GLOBULIN SER CALC-MCNC: 4.1 G/DL (ref 2–4)
GLUCOSE SERPL-MCNC: 107 MG/DL (ref 65–100)
HCT VFR BLD AUTO: 38.3 % (ref 36.6–50.3)
HGB BLD-MCNC: 12.5 G/DL (ref 12.1–17)
MAGNESIUM SERPL-MCNC: 2.2 MG/DL (ref 1.6–2.4)
MCH RBC QN AUTO: 29.6 PG (ref 26–34)
MCHC RBC AUTO-ENTMCNC: 32.6 G/DL (ref 30–36.5)
MCV RBC AUTO: 90.8 FL (ref 80–99)
NRBC # BLD: 0 K/UL (ref 0–0.01)
NRBC BLD-RTO: 0 PER 100 WBC
PLATELET # BLD AUTO: 192 K/UL (ref 150–400)
PMV BLD AUTO: 9.6 FL (ref 8.9–12.9)
POTASSIUM SERPL-SCNC: 3.6 MMOL/L (ref 3.5–5.1)
PROT SERPL-MCNC: 7.2 G/DL (ref 6.4–8.2)
RBC # BLD AUTO: 4.22 M/UL (ref 4.1–5.7)
SODIUM SERPL-SCNC: 132 MMOL/L (ref 136–145)
WBC # BLD AUTO: 7.1 K/UL (ref 4.1–11.1)

## 2019-06-24 PROCEDURE — 83735 ASSAY OF MAGNESIUM: CPT

## 2019-06-24 PROCEDURE — 74011250637 HC RX REV CODE- 250/637: Performed by: INTERNAL MEDICINE

## 2019-06-24 PROCEDURE — 80053 COMPREHEN METABOLIC PANEL: CPT

## 2019-06-24 PROCEDURE — 71045 X-RAY EXAM CHEST 1 VIEW: CPT

## 2019-06-24 PROCEDURE — 83880 ASSAY OF NATRIURETIC PEPTIDE: CPT

## 2019-06-24 PROCEDURE — 93306 TTE W/DOPPLER COMPLETE: CPT

## 2019-06-24 PROCEDURE — 99232 SBSQ HOSP IP/OBS MODERATE 35: CPT | Performed by: INTERNAL MEDICINE

## 2019-06-24 PROCEDURE — 85027 COMPLETE CBC AUTOMATED: CPT

## 2019-06-24 PROCEDURE — 77010033678 HC OXYGEN DAILY

## 2019-06-24 PROCEDURE — 36415 COLL VENOUS BLD VENIPUNCTURE: CPT

## 2019-06-24 PROCEDURE — 97116 GAIT TRAINING THERAPY: CPT

## 2019-06-24 PROCEDURE — 65660000000 HC RM CCU STEPDOWN

## 2019-06-24 RX ADMIN — Medication 10 ML: at 06:00

## 2019-06-24 RX ADMIN — SPIRONOLACTONE 25 MG: 25 TABLET ORAL at 09:30

## 2019-06-24 RX ADMIN — Medication 10 ML: at 14:11

## 2019-06-24 RX ADMIN — TORSEMIDE 40 MG: 20 TABLET ORAL at 09:29

## 2019-06-24 RX ADMIN — CARVEDILOL 6.25 MG: 6.25 TABLET, FILM COATED ORAL at 09:29

## 2019-06-24 RX ADMIN — ASPIRIN 81 MG: 81 TABLET ORAL at 09:30

## 2019-06-24 RX ADMIN — CARVEDILOL 6.25 MG: 6.25 TABLET, FILM COATED ORAL at 17:12

## 2019-06-24 RX ADMIN — Medication 10 ML: at 22:00

## 2019-06-24 RX ADMIN — AMIODARONE HYDROCHLORIDE 200 MG: 200 TABLET ORAL at 09:30

## 2019-06-24 RX ADMIN — APIXABAN 5 MG: 5 TABLET, FILM COATED ORAL at 17:08

## 2019-06-24 RX ADMIN — TORSEMIDE 40 MG: 20 TABLET ORAL at 17:08

## 2019-06-24 RX ADMIN — AMIODARONE HYDROCHLORIDE 200 MG: 200 TABLET ORAL at 17:08

## 2019-06-24 RX ADMIN — Medication 10 ML: at 21:26

## 2019-06-24 RX ADMIN — APIXABAN 5 MG: 5 TABLET, FILM COATED ORAL at 09:29

## 2019-06-24 NOTE — PROGRESS NOTES
Problem: Mobility Impaired (Adult and Pediatric) Goal: *Acute Goals and Plan of Care (Insert Text) Description Physical Therapy Goals Revised 6/24/2019 1. Pt will amb for 400 feet independently within 7 days. 2.  Patient will participate in standing balance activities with supervision within 7 days. Physical Therapy Goals Revised 6/14/19 1. Patient will ambulate with modified independence for 400 feet with the least restrictive device within 7 day(s). Initiated 6/7/2019 1. Patient will ambulate with modified independence for 400 feet with the least restrictive device within 7 day(s).  
    
6/24/2019 [address] Outcome: Progressing Towards Goal 
 PHYSICAL THERAPY TREATMENT: WEEKLY REASSESSMENT with a six minute walk test 
Patient: Priyanka Arora (77 y.o. male) Date: 6/24/2019 Diagnosis: Acute on chronic systolic CHF (congestive heart failure) ([facility]) [I50.23] Acute on chronic systolic CHF (congestive heart failure) ([address] Procedure(s) (LRB): 
INSERT ICD BIV MULTI (N/A) 3 Days Post-Op Precautions:  pacer Chart, physical therapy assessment, plan of care and goals were reviewed. ASSESSMENT: 
Pt now s/p pacer upgrade on 6/21/2019. I educated pt on pacer precautions and he verbalized understanding. He was agreeable to a six minute walk test, results below. Pt used a single point cane today for amb (at baseline is independent/mod I using a walking stick prn) and gait is generally unsteady with noted path deviations, but safe for discharge to home. He was on room air and his lowest 02 sats during the six minute walk test was 91%. I do recommend outpt PT to work on his balance issues to then be followed by cardiac rehab. Patient's progression toward goals since last assessment: goals met so upgraded. Decreased to 3 times a week. PLAN: 
Goals have been updated based on progression since last assessment. Patient continues to benefit from skilled intervention to address the above impairments. Continue to follow the patient 3 times a week to address goals. Planned Interventions: 
?              Bed Mobility Training             ? Neuromuscular Re-Education ? Transfer Training                   ? Orthotic/Prosthetic Training 
? Gait Training                         ? Modalities ? Therapeutic Exercises           ? Edema Management/Control ? Therapeutic Activities            ? Patient and Family Training/Education ? Other (comment): 
Discharge Recommendations: Outpatient PT Further Equipment Recommendations for Discharge: none SUBJECTIVE:  
Patient stated My balance has never been good.  OBJECTIVE DATA SUMMARY:  
Chart checked, pt cleared by nursing Critical Behavior: 
Neurologic State: Alert Orientation Level: Oriented X4 Cognition: Appropriate decision making, Appropriate for age attention/concentration, Appropriate safety awareness, Follows commands Strength:  
  
  
 functional 
  
  
  
  
 
Functional Mobility Training: 
Bed Mobility: 
  
 Not assessed Transfers: 
Sit to Stand: Modified independent Stand to Sit: Modified independent Balance: 
Sitting: Intact; Without support Standing: Impaired; With support Standing - Static: Constant support;Good Standing - Dynamic : Constant support;Good Ambulation/Gait Training: 
Distance (ft): 680 Feet (ft) Assistive Device: Gait belt;Cane, straight Ambulation - Level of Assistance: Supervision Gait Abnormalities: Decreased step clearance; Path deviations On room air Distance Walked in Feet (ft): 646 ft. Pre Heart Rate: 72   
Pre O2 Saturation: 98   
   
Post Heart Rate: 84   
Post O2 Saturation: 93   
Assistive device used: Assistive Device: Gait belt;Cane, straight Normative data:  
Men 39-80 years old = 1889 feet; Women 3680 years aps=0801 feet Modified 10 point Simone RPE scale utilized: 
0 = no breathlessness at all ---> 10 = maximum exertion Please refer to the flowsheet for any additional vital signs taken during this treatment. Stairs: 
  
  
  
Neuro Re-Education: 
 
Therapeutic Exercises:  
Pursed lip breathing Pain: 
Pain Scale 1: Numeric (0 - 10) Pain Intensity 1: 0 Activity Tolerance:  
See assessment and chart above Please refer to the flowsheet for vital signs taken during this treatment. After treatment:  
?  Patient left in no apparent distress sitting up at the EOB ? Patient left in no apparent distress in bed 
? Call bell left within reach ? Nursing notified ? Caregiver present ? Bed alarm activated COMMUNICATION/COLLABORATION:  
The patients plan of care was discussed with: Registered Nurse Ayaka Graham Time Calculation: 26 mins

## 2019-06-24 NOTE — PROGRESS NOTES
[facility] Progress Note                                        KVPIQ Date: 5/31/2019 
  
Pt reports he is feeling pretty good. He is down 6 pounds in the last 4 days His BNP and Cr increased once milrinone was stopped 
  
Assessment/Plan: # Acute on chronic systolic HF -EF 92% 
- improved initially  with diuretics, prev on dobutamine than on milrinone until Friday. His creat slightly improved today; he did sleep with 1 liter; discussed plans with Dr. Prerna Jain. 
  
# AF -reverted to afib, cont eliquis and amiodarone; in aflutter, CV 5/18; remains in sinus on amiodarone 
- Consider ablation in future once he has been optimized.  
  
# LBBB - BiV ICD/pacer done 6/21; site looks fine. Back on eliquis 
  
# Acute on chronic kidney injury-renal US normal; slightly elevated since milrinone d/s'd but today a little better 
  
# fever- urine culture positive with gram-rods; on rocephin-last day wednesday; repeat culture negative; afebrile for several days 
  #urinary retention-urology suggested home with neal but neal removed; urinating fine 
  
# Anemia-iron deficient; stable Exam: 
Blood pressure 109/80, pulse 73, temperature 98.2 °F (36.8 °C), resp. rate 16, weight 90 kg (198 lb 6.6 oz), SpO2 92 %. Labs: 
Na 132 
K 3.6 NT proBNP 3795 Creat 2.23 Aleksey Zepeda MD

## 2019-06-24 NOTE — PROGRESS NOTES
[address] [facility] [address] [facility] in [place], [place] Inpatient Progress Note Patient name: Prince Carias Patient : 1950 Patient MRN: [id number] Attending MD: Laura Francis MD 
Date of service: 19 CHIEF COMPLAINT: 
Acute on chronic systolic heart failure HPI:  
Merlin Furlong is a 75 y/o pleasant white male with h/o HTN, HL, likey JOSE, CAD s/p cardiac arrest VFib s/p CABG () c/b sternal would infection and sternotomy, ischemic cardiomyopathy LVEF 15-20%, s/p ICD and with LBBB. Patient admitted with acute on chronic systolic heart failure with massive volume overload > 20 lbs, in the setting of atrial fibrillation s/p failed DCCV and new UTI now treated with IV antibiotics. He underwent DCCV and RHC on . He underwent BiVICD upgrade yesterday. His IV milrinone and IV bumex were discontinued yesterday. Will continue oral diuretics and recheck his renal function in the morning, prior to discharge. Recent Events: POD 3 - BiVICD upgrade ([employer]) Cr remains elevated Hyponatremia persists Slept well last night  
  
Plan:  
Continue torsemide 40 mg BID (holds fluid in his abdomen) Continue spironolactone 25mg daily Coreg 6.25 mg po BID Continue amiodarone for rate control Continue apixaban for [facility] Intolerant to ACE/ARB/ARNi due to renal dysfunction Trend renal function, NT proBNP, symptom Dr. Jed Singh discussed with Dr. Chamberlain Benito concerns about worsening renal function off IV milrinone IMPRESSION: 
Fatigue Shortness of breath Volume overload Pulmonary edema Acute on chronic systolic heart failure Stage C, NYHA class IVsymptoms Coronary artery disease S/p arrest VFib and subsequent CABG  Sternal wound infection requiring sternectomy Ischemic cardiomyopathy, LVEF 20% 10/18 Atrial fibrillation s/p failed DCCV 6/10/19, s/p successful DCCV 19 Chronic anticoagulation with eliquis S/p BiVICD 19 Acute on CKD, stage 3 
 UTI 
Urinary retension Anemia, iron deficiency H/o DVT Migraines JACQUE on 2/4/19 showed thrombus H/o tobacco abuse Nighttime desaturations, likely JOSE Body mass index is 27.99 kg/m². CARDIAC IMAGING: 
Echo (5/31/19) LVEF 21-25%, severely dilated LA, moderately dilated RA 
EKG (6/12/19) atrial flutter with occasional PVCs, LBBB QRS 158ms LHC not in epic 
  
HEMODYNAMICS: 
CPEST not done 6MW not done 
  
OTHER IMAGING: 
CT chest (5/31/19) 1. No evidence of pulmonary embolus. 2. Interstitial edema and small bilateral pleural effusions. 3. Mild mediastinal adenopathy. 
  
General: Alert, oriented, no distress Cardiovascular: NSR, S1 S2, Pulm: Adequate oxygenation on RA, no distress Abdomen: Soft, no mass or tenderness. No organomegaly or hernia. Bowel sounds present. Genitourinary and rectal: deferred Extremities: No cyanosis, clubbing, or edema. Neurologic: No focal sensory or motor deficits are noted. Grossly intact. Psychiatric: Awake, alert an doriented x 3. Appropriate mood and affect. Skin: Warm, dry and well perfused. No lesions, nodules or rashes are noted. REVIEW OF SYSTEMS: 
General: Denies fever, night sweats. Ear, nose and throat: Denies difficulty hearing, sinus problems, runny nose, post-nasal drip, ringing in ears, mouth sores, loose teeth, ear pain, nosebleeds, sore throate, facial pain or numbess Cardiovascular: see above in the interval history Respiratory: Denies cough, wheezing, sputum production, hemoptysis. Dyspnea improved Gastrointestinal: Denies heartburn, constipation, intolerance to certain foods, diarrhea, abdominal pain, nausea, vomiting, difficulty swallowing, blood in stool Kidney and bladder: Denies painful urination, frequent urination, urgency, prostate problems and impotence Musculoskeletal: Denies joint pain, muscle weakness Skin and hair: Denies change in existing skin lesions, hair loss or increase, breast changes PAST MEDICAL HISTORY: 
 Past Medical History:  
Diagnosis Date  Degenerative disc disease, lumbar  Heart failure ([facility]  High cholesterol  Hypertension  Paroxysmal atrial fibrillation ([facility] 2019  Spinal stenosis PAST SURGICAL HISTORY: 
Past Surgical History:  
Procedure Laterality Date  HX APPENDECTOMY  HX CORONARY ARTERY BYPASS GRAFT    
 triple  HX HERNIA REPAIR    
 HX IMPLANTABLE CARDIOVERTER DEFIBRILLATOR  WI CARDIOVERSION ELECTIVE ARRHYTHMIA EXTERNAL N/A 6/10/2019 EP CARDIOVERSION performed by Jennifer Fisher MD at [address], [facility]  CATH LAB  WI CARDIOVERSION ELECTIVE ARRHYTHMIA EXTERNAL N/A 2019 EP CARDIOVERSION performed by Ashley Fowler MD at [address] CATH LAB FAMILY HISTORY: 
History reviewed. No pertinent family history. SOCIAL HISTORY: 
Social History Socioeconomic History  Marital status:  Spouse name: Not on file  Number of children: Not on file  Years of education: Not on file  Highest education level: Not on file Tobacco Use  Smoking status: Former Smoker Last attempt to quit: 2010 Years since quittin.5  Smokeless tobacco: Never Used Substance and Sexual Activity  Alcohol use: Yes Comment: rarely  Drug use: Never LABORATORY RESULTS: 
  
Labs Latest Ref Rng & Units 2019 WBC 4.1 - 11.1 K/uL 7.1 7.9 - - 6.3 5.3 6.1  
RBC 4.10 - 5.70 M/uL 4.22 4.16 - - 4.07(L) 3.76(L) 3.82(L) Hemoglobin 12.1 - 17.0 g/dL 12.5 12.7 - - 12.3 11. 3(L) 11. 6(L) Hematocrit 36.6 - 50.3 % 38.3 37.7 - - 37.3 34. 8(L) 35. 8(L) MCV 80.0 - 99.0 FL 90.8 90.6 - - 91.6 92.6 93.7 Platelets 695 - 387 K/uL 192 207 - - 261 246 286 Lymphocytes 12 - 49 % - 14 - - - 14 14 Monocytes 5 - 13 % - 13 - - - 4(L) 8 Eosinophils 0 - 7 % - 1 - - - 1 3 Basophils 0 - 1 % - 1 - - - 0 1 Bands 0 - 6 % - - - - - - -  
 Albumin 3.5 - 5.0 g/dL 3. 1(L) - 3. 3(L) - - - 3. 1(L) Calcium 8.5 - 10.1 MG/DL 9.2 9.6 9.3 9.3 9.4 8.9 8.8 SGOT 15 - 37 U/L 12(L) - 25 - - - 17 Glucose 65 - 100 mg/dL 107(H) 209(H) 88 95 106(H) 96 117(H) BUN 6 - 20 MG/DL 39(H) 36(H) 31(H) 31(H) 32(H) 32(H) 31(H) Creatinine 0.70 - 1.30 MG/DL 2.23(H) 2.37(H) 2.02(H) 2.05(H) 2.14(H) 2.10(H) 2.20(H) Sodium 136 - 145 mmol/L 132(L) 131(L) 133(L) 132(L) 132(L) 135(L) 135(L) Potassium 3.5 - 5.1 mmol/L 3.6 3.6 3.7 3.6 3.7 3.7 3. 4(L) TSH 0.36 - 3.74 uIU/mL - - - - - - - Some recent data might be hidden Lab Results Component Value Date/Time TSH 2.45 06/01/2019 04:16 AM  
 
 
CURRENT MEDICATIONS: 
 
Current Facility-Administered Medications:  
  apixaban (ELIQUIS) tablet 5 mg, 5 mg, Oral, BID, Read Lesch, MD, 5 mg at 06/24/19 9207   torsemide (DEMADEX) tablet 40 mg, 40 mg, Oral, BID, Mounika Altman MD, 40 mg at 06/24/19 3115 
  sodium chloride (NS) flush 5-40 mL, 5-40 mL, IntraVENous, Q8H, Tahir Ness MD, 10 mL at 06/24/19 1411 
  sodium chloride (NS) flush 5-40 mL, 5-40 mL, IntraVENous, PRN, Tahir Ness MD 
  oxyCODONE-acetaminophen (PERCOCET) 5-325 mg per tablet 1 Tab, 1 Tab, Oral, Q4H PRN, Tahir Ness MD 
  carvedilol (COREG) tablet 6.25 mg, 6.25 mg, Oral, BID WITH MEALS, Tahir Ness MD, 6.25 mg at 06/24/19 0929 
  evolocumab (REPATHA SURECLICK) pen injection 323 mg (Patient Supplied), 140 mg, SubCUTAneous, Q 14 DAYS, Tahir Ness MD, 140 mg at 06/14/19 1735   spironolactone (ALDACTONE) tablet 25 mg, 25 mg, Oral, DAILY, Tahir Ness MD, 25 mg at 06/24/19 0930 
  melatonin tablet 3 mg, 3 mg, Oral, QHS PRN, Tahir Ness MD, 3 mg at 06/09/19 2125 
  amiodarone (CORDARONE) tablet 200 mg, 200 mg, Oral, BID, Tahir Ness MD, 200 mg at 06/24/19 0930   aspirin delayed-release tablet 81 mg, 81 mg, Oral, DAILY, Tahir Ness MD, 81 mg at 06/24/19 0930   sodium chloride (NS) flush 5-40 mL, 5-40 mL, IntraVENous, Q8H, Cosme Nuno MD, 10 mL at 06/24/19 1411 
  sodium chloride (NS) flush 5-40 mL, 5-40 mL, IntraVENous, PRN, Cosme Nuno MD, 10 mL at 06/03/19 3765   ondansetron (ZOFRAN) injection 4 mg, 4 mg, IntraVENous, Q4H PRN, Cosme Nuno MD 
  bisacodyl (DULCOLAX) tablet 5 mg, 5 mg, Oral, DAILY PRN, Cosme Nuno MD 
  acetaminophen (TYLENOL) tablet 650 mg, 650 mg, Oral, Q4H PRN, Cosme Nuno MD, 650 mg at 06/19/19 1701   morphine injection 2 mg, 2 mg, IntraVENous, Q4H PRN, MD Aislinn Kong, AGACNP-[address], [address] Office [phone] Fax [phone] Patient seen and examined. Data and note reviewed. I have discussed and agree with the plans as noted. 76year old male with a history of HTN, JOSE, CAD s/p cardiac arrest and CABG, ICM, s/p BiVICD who developed worsening renal function with weaning IV inotropic support. Discussed the option of continuous outpatient inotropic support with patient and Dr. Marley Said. Thank you for allowing us to participate in your patient's care. Mounika Garnica MD, [place] Chief of Cardiology, [facility] Medical Director [address], [address] Office [phone] Fax [phone]

## 2019-06-24 NOTE — PROGRESS NOTES
Problem: Heart Failure: Day 5 Goal: Off Pathway (Use only if patient is Off Pathway) Outcome: Progressing Towards Goal 
Note:  
Pt up ad sheron and call bell within reach. ECHO results pending. Previous ECHO indicates EF 20%. Cardiac diet in place. Pt receiving Coreg to increase cardiac output and BP control. Amiodarone and eliquis included in plan of care d/t history of A-fib. Torsemide and Spironolactone to reduce effects of fluid build up from HF dx as pt holds fluid in abdomen. Will continue to monitor kidney function. Breath sounds clear bilaterally. Potential plans to discharge home with cardiac rehab this week. Pt agreeable to plan. Problem: Falls - Risk of 
Goal: *Absence of Falls Description Document Rico Castro Fall Risk and appropriate interventions in the flowsheet. Outcome: Progressing Towards Goal 
Note:  
Fall Risk Interventions: 
Mobility Interventions: Communicate number of staff needed for ambulation/transfer, Patient to call before getting OOB Medication Interventions: Evaluate medications/consider consulting pharmacy, Patient to call before getting OOB, Teach patient to arise slowly Elimination Interventions: Call light in reach History of Falls Interventions: Door open when patient unattended, Room close to nurse's station

## 2019-06-24 NOTE — PROGRESS NOTES
[facility] VAD/Heart Failure Progress Note Admit Date: 2019 POD:  3 Days Post-Op Procedure:  Procedure(s): 
INSERT ICD BIV MULTI Subjective: Dyspnea, fatigue, and weakness; asked to eval sternum for potential redo surgery; getting op notes Objective:  
Vitals: 
Blood pressure 109/80, pulse 73, temperature 98.2 °F (36.8 °C), resp. rate 16, height 6' 2\" (1.88 m), weight 198 lb (89.8 kg), SpO2 92 %. Temp (24hrs), Av.2 °F (36.8 °C), Min:98 °F (36.7 °C), Max:98.3 °F (36.8 °C) Hemodynamics: 
 CO:   
 CI:   
 CVP:   
 SVR:   
 PAP Systolic:   
 PAP Diastolic:   
 PVR:   
 DX05:   
 SCV02:   
 
VAD Interrogation:   
 
EKG/Rhythm:   
 
Extubation Date / Time:  
 
CT Output:  
 
Ventilator: 
Ventilator Volumes Vt Spont (ml): 808 ml (06/15/19 010) Ve Observed (l/min): 18.3 l/min (06/15/19 010) Oxygen Therapy: 
Oxygen Therapy O2 Sat (%): 92 % (19 1126) Pulse via Oximetry: 71 beats per minute (19 1715) O2 Device: Room air (19 1400) O2 Flow Rate (L/min): 2 l/min (19 0302) FIO2 (%): 40 % (06/15/19 010) CXR: 
 
Admission Weight: Last Weight Weight: 221 lb 9 oz (100.5 kg) Weight: 198 lb (89.8 kg) Intake / Output / Drain: 
Current Shift:  0701 -  1900 In: -  
Out: 450 [Urine:450] Last 24 hrs.:  
 
Intake/Output Summary (Last 24 hours) at 2019 1508 Last data filed at 2019 1131 Gross per 24 hour Intake  Output 1050 ml Net -1050 ml No results for input(s): CPK, CKMB, TROIQ in the last 72 hours. Recent Labs  
  19 
0305 19 
0900 19 
0422 0619 * 131*  --   --  133*  132* K 3.6 3.6  --   --  3.7  3.6 CO2 32 29  --   --  30  32 BUN 39* 36*  --   --  31*  31* CREA 2.23* 2.37*  --   --  2.02*  2.05* * 209*  --   --  88  95 MG 2.2  --   --  2.3 2.2 WBC 7.1  --  7.9  --   --   
HGB 12.5  --  12.7  --   --   
 HCT 38.3  --  37.7  --   --   
  --  207  --   -- No results for input(s): INR, PTP, APTT in the last 72 hours. No lab exists for component: INREXT No lab exists for component: PBNP Current Facility-Administered Medications:  
  apixaban (ELIQUIS) tablet 5 mg, 5 mg, Oral, BID, Harrison Sr MD, 5 mg at 06/24/19 8743   torsemide (DEMADEX) tablet 40 mg, 40 mg, Oral, BID, Mounika Altman MD, 40 mg at 06/24/19 5656 
  sodium chloride (NS) flush 5-40 mL, 5-40 mL, IntraVENous, Q8H, Dipesh Segovia MD, 10 mL at 06/24/19 1411 
  sodium chloride (NS) flush 5-40 mL, 5-40 mL, IntraVENous, PRN, Dipesh Segovia MD 
  oxyCODONE-acetaminophen (PERCOCET) 5-325 mg per tablet 1 Tab, 1 Tab, Oral, Q4H PRN, Dipesh Segovia MD 
  carvedilol (COREG) tablet 6.25 mg, 6.25 mg, Oral, BID WITH MEALS, Dipesh Segovia MD, 6.25 mg at 06/24/19 0929 
  evolocumab (REPATHA SURECLICK) pen injection 300 mg (Patient Supplied), 140 mg, SubCUTAneous, Q 14 DAYS, Dipesh Segovia MD, 140 mg at 06/14/19 1735   spironolactone (ALDACTONE) tablet 25 mg, 25 mg, Oral, DAILY, Dipesh Segovia MD, 25 mg at 06/24/19 0930 
  melatonin tablet 3 mg, 3 mg, Oral, QHS PRN, Dipesh Segovia MD, 3 mg at 06/09/19 2125 
  amiodarone (CORDARONE) tablet 200 mg, 200 mg, Oral, BID, Dipesh Segovia MD, 200 mg at 06/24/19 0930   aspirin delayed-release tablet 81 mg, 81 mg, Oral, DAILY, Dipesh Segovia MD, 81 mg at 06/24/19 0930 
  sodium chloride (NS) flush 5-40 mL, 5-40 mL, IntraVENous, Q8H, Dipesh Segovia MD, 10 mL at 06/24/19 1411 
  sodium chloride (NS) flush 5-40 mL, 5-40 mL, IntraVENous, PRN, Dipesh Segovia MD, 10 mL at 06/03/19 9737   ondansetron (ZOFRAN) injection 4 mg, 4 mg, IntraVENous, Q4H PRN, Dipesh Segovia MD 
  bisacodyl (DULCOLAX) tablet 5 mg, 5 mg, Oral, DAILY PRN, Dipesh Segovia MD 
  acetaminophen (TYLENOL) tablet 650 mg, 650 mg, Oral, Q4H PRN, Malia Florian MD, 650 mg at 06/19/19 1701   morphine injection 2 mg, 2 mg, IntraVENous, Q4H PRN, Malia Florian MD 
 
A/P 
 
NYHA class IV A/C systolic HF - medical Tx 
A/C kidney disease - monitor A-fib - eliquis Urinary retention - monitor Risk of morbidity and mortality - high Medical decision making - high complexity Signed By: Yonny Yang MD

## 2019-06-24 NOTE — PROGRESS NOTES
Hospitalist Progress Note Odessa Nicholas MD 
Please call  and page for questions. Call physician on-call through the  7pm-7am 
 
Daily Progress Note: 6/24/2019 Primary care Audi Crespo MD 
 
Date of admission: 5/31/2019  7:03 PM 
 
Admission summery and hospital course: 
71-year-old man with past medical history significant for chronic systolic congestive heart failure, dyslipidemia, hypertension, atrial fibrillation status post cardioversion, was in his usual state of health until the day of presentation at the emergency room when the patient developed worsening of his shortness of breath.  The inpatient underwent cardioversion early this morning by his cardiologist. 
Jewel Ronde started on 06/05. 
  
Subjective:  
 
Patient is seen and examined at bedside. Family present. He feels ok. No new complaints. Wants to go home. Sob improved, no edema. Assessment/Plan:  
Acute-on-chronic systolic congestive heart failure NYHA 2-3 - improving JACQUE on 05/31 with EF of 21%-25%.      
Continue coreg CTA of the chest is negative for pulmonary embolism.  Appreciated cardiology input. AHF team on board Discontinuing  milrinone and bumex drip 6/22 RHC done 6/18 - elevated pressures C/w po aldactone Saturating on RA 
C/w Torsemide 40mg bid Discussed with Dr. Kimi Sellers: still elevated pro BNP and cr, need to watch atleast one more day CT surgery consulted, recs medical management  
  
Atrial fibrillation, status post cardioversion 6/18   
Aflutter - reverted to afib 
- he had cardioversions in the past 
- c/w amiodarone. Eliquis restarted LBBB - upgraded to BiV ICD/pacer 6/22.   
 
Hypertension. stable . C/w coreg Acute kidney injury on CKD   
Creatinine worsened -  Most likely as a result due to diuresis  
- Renal US: 06/05 was normal.  
- avoid nephrotoxins - will monitor Hypokalemia- resolved Anemia-iron deficient - hb stable UTI:  
Afebrile. Completed Rocephin.  
Ucx: Enterobacter Cloacae Urinary retention- resolved, off neal Insomnia: melatonin. 
  
VTE prophylaxis: Eliquis Code status: Full Discussed plan of care with Patient/Family and Nurse.  
Pre-admission lived at home. Discharge planning: TBD. outpt cardiac rehab. Discharge plan per heart failure team.   
 
 
Review of Systems:  
 
Review of Systems: 
Symptom  Y/N  Comments   Symptom  Y/N  Comments Fever/Chills   n   Chest Pain  n   
Poor Appetite  n    Edema  n    
Cough  n   Abdominal Pain  n    
Sputum  n   Joint Pain SOB/DOUGLAS  y Improved   Pruritis/Rash Nausea/vomit  n   Tolerating PT/OT Diarrhea     Tolerating Diet  y Constipation     Other Could not obtain due to:    
 
 
Objective:  
Physical Exam:  
 
Visit Vitals /80 Pulse 73 Temp 98.2 °F (36.8 °C) Resp 16 Ht 6' 2\" (1.88 m) Wt 89.8 kg (198 lb) SpO2 92% BMI 25.42 kg/m² O2 Flow Rate (L/min): 2 l/min O2 Device: Nasal cannula Temp (24hrs), Av.2 °F (36.8 °C), Min:98 °F (36.7 °C), Max:98.3 °F (36.8 °C) 
  701 - 1900 In: -  
Out: 450 [Urine:450]   1901 - 700 In: -  
Out:  [[facility]:7681] General:  Alert, cooperative, no distress, appears stated age. Lungs:     clear eldon bases Heart:  Regular rate and rhythm, S1, S2 normal, no murmur.   
Abdomen:   Soft, non-tender. Bowel sounds normal.   
Extremities: Extremities normal, atraumatic, no cyanosis. Edema at LEs. Skin: Skin color, texture, turgor normal. No rashes or lesions Neurologic: Grossly normal.   
  
Data Review:  
   
Recent Days: 
Recent Labs  
  19 
0305 19 
0422 WBC 7.1 7.9 HGB 12.5 12.7 HCT 38.3 37.7  207 Recent Labs  
  19 
0305 19 
0900 19 
0400 19 
0400 * 131*  --  133*  132* K 3.6 3.6  --  3.7  3.6 CL 93* 92*  --  92*  91* CO2 32 29  --  30  32 * 209*  --  88  95 BUN 39* 36*  --  31*  31* CREA 2.23* 2.37*  --  2.02*  2.05* CA 9.2 9.6  --  9.3  9.3 MG 2.2  --  2.3 2.2 ALB 3.1*  --   --  3.3* SGOT 12*  --   --  25 ALT 12  --   --  23 No results for input(s): PH, PCO2, PO2, HCO3, FIO2 in the last 72 hours. 24 Hour Results: 
Recent Results (from the past 24 hour(s)) NT-PRO BNP Collection Time: 06/24/19  3:05 AM  
Result Value Ref Range NT pro-BNP 3,795 (H) <125 PG/ML  
MAGNESIUM Collection Time: 06/24/19  3:05 AM  
Result Value Ref Range Magnesium 2.2 1.6 - 2.4 mg/dL METABOLIC PANEL, COMPREHENSIVE Collection Time: 06/24/19  3:05 AM  
Result Value Ref Range Sodium 132 (L) 136 - 145 mmol/L Potassium 3.6 3.5 - 5.1 mmol/L Chloride 93 (L) 97 - 108 mmol/L  
 CO2 32 21 - 32 mmol/L Anion gap 7 5 - 15 mmol/L Glucose 107 (H) 65 - 100 mg/dL BUN 39 (H) 6 - 20 MG/DL Creatinine 2.23 (H) 0.70 - 1.30 MG/DL  
 BUN/Creatinine ratio 17 12 - 20 GFR est AA 36 (L) >60 ml/min/1.73m2 GFR est non-AA 29 (L) >60 ml/min/1.73m2 Calcium 9.2 8.5 - 10.1 MG/DL Bilirubin, total 0.6 0.2 - 1.0 MG/DL  
 ALT (SGPT) 12 12 - 78 U/L  
 AST (SGOT) 12 (L) 15 - 37 U/L Alk. phosphatase 93 45 - 117 U/L Protein, total 7.2 6.4 - 8.2 g/dL Albumin 3.1 (L) 3.5 - 5.0 g/dL Globulin 4.1 (H) 2.0 - 4.0 g/dL A-G Ratio 0.8 (L) 1.1 - 2.2    
CBC W/O DIFF Collection Time: 06/24/19  3:05 AM  
Result Value Ref Range WBC 7.1 4.1 - 11.1 K/uL  
 RBC 4.22 4.10 - 5.70 M/uL  
 HGB 12.5 12.1 - 17.0 g/dL HCT 38.3 36.6 - 50.3 % MCV 90.8 80.0 - 99.0 FL  
 MCH 29.6 26.0 - 34.0 PG  
 MCHC 32.6 30.0 - 36.5 g/dL  
 RDW 13.9 11.5 - 14.5 % PLATELET 665 578 - 944 K/uL MPV 9.6 8.9 - 12.9 FL  
 NRBC 0.0 0  WBC ABSOLUTE NRBC 0.00 0.00 - 0.01 K/uL ECHO ADULT COMPLETE Collection Time: 06/24/19  1:55 PM  
Result Value Ref Range LA Volume 108.36 18 - 58 mL  
 LV E' Lateral Velocity 9.18 cm/s LV E' Septal Velocity 3.61 cm/s Tapse 1.73 1.5 - 2.0 cm Ao Root D 3.17 cm Problem List: 
Problem List as of 6/24/2019 Date Reviewed: 6/10/2019 Codes Class Noted - Resolved * (Principal) Acute on chronic systolic CHF (congestive heart failure) (HCC) ICD-10-CM: L86.06 ICD-9-CM: 428.23, 428.0  5/31/2019 - Present Paroxysmal atrial fibrillation (HCC) ICD-10-CM: I48.0 ICD-9-CM: 427.31  4/2/2019 - Present Medications reviewed Current Facility-Administered Medications Medication Dose Route Frequency  apixaban (ELIQUIS) tablet 5 mg  5 mg Oral BID  torsemide (DEMADEX) tablet 40 mg  40 mg Oral BID  sodium chloride (NS) flush 5-40 mL  5-40 mL IntraVENous Q8H  
 sodium chloride (NS) flush 5-40 mL  5-40 mL IntraVENous PRN  
 oxyCODONE-acetaminophen (PERCOCET) 5-325 mg per tablet 1 Tab  1 Tab Oral Q4H PRN  
 carvedilol (COREG) tablet 6.25 mg  6.25 mg Oral BID WITH MEALS  evolocumab (REPATHA SURECLICK) pen injection 811 mg (Patient Supplied)  140 mg SubCUTAneous Q 14 DAYS  spironolactone (ALDACTONE) tablet 25 mg  25 mg Oral DAILY  melatonin tablet 3 mg  3 mg Oral QHS PRN  
 amiodarone (CORDARONE) tablet 200 mg  200 mg Oral BID  aspirin delayed-release tablet 81 mg  81 mg Oral DAILY  sodium chloride (NS) flush 5-40 mL  5-40 mL IntraVENous Q8H  
 sodium chloride (NS) flush 5-40 mL  5-40 mL IntraVENous PRN  
 ondansetron (ZOFRAN) injection 4 mg  4 mg IntraVENous Q4H PRN  
 bisacodyl (DULCOLAX) tablet 5 mg  5 mg Oral DAILY PRN  
 acetaminophen (TYLENOL) tablet 650 mg  650 mg Oral Q4H PRN  
 morphine injection 2 mg  2 mg IntraVENous Q4H PRN Care Plan discussed with: Patient/Family and Nurse Total time spent with patient: 30 minutes.  
 
Nahid Ambriz MD

## 2019-06-24 NOTE — ROUTINE PROCESS
Bedside shift change report given to KATRIN Edwards (oncoming nurse) by [employer], RN (offgoing nurse). Report included the following information SBAR, Kardex, ED Summary, Intake/Output, MAR, Accordion, Recent Results and Cardiac Rhythm Paced.

## 2019-06-25 LAB
ALBUMIN SERPL-MCNC: 3.2 G/DL (ref 3.5–5)
ALBUMIN/GLOB SERPL: 0.8 {RATIO} (ref 1.1–2.2)
ALP SERPL-CCNC: 90 U/L (ref 45–117)
ALT SERPL-CCNC: 14 U/L (ref 12–78)
ANION GAP SERPL CALC-SCNC: 7 MMOL/L (ref 5–15)
AST SERPL-CCNC: 18 U/L (ref 15–37)
BASOPHILS # BLD: 0.1 K/UL (ref 0–0.1)
BASOPHILS NFR BLD: 1 % (ref 0–1)
BILIRUB SERPL-MCNC: 0.6 MG/DL (ref 0.2–1)
BNP SERPL-MCNC: 3401 PG/ML
BUN SERPL-MCNC: 44 MG/DL (ref 6–20)
BUN/CREAT SERPL: 19 (ref 12–20)
CALCIUM SERPL-MCNC: 9.2 MG/DL (ref 8.5–10.1)
CHLORIDE SERPL-SCNC: 95 MMOL/L (ref 97–108)
CO2 SERPL-SCNC: 30 MMOL/L (ref 21–32)
CREAT SERPL-MCNC: 2.32 MG/DL (ref 0.7–1.3)
DIFFERENTIAL METHOD BLD: ABNORMAL
ECHO AO ROOT DIAM: 3.17 CM
ECHO AV AREA PEAK VELOCITY: 3.8 CM2
ECHO AV PEAK GRADIENT: 4.2 MMHG
ECHO AV PEAK VELOCITY: 102.97 CM/S
ECHO EST RA PRESSURE: 3 MMHG
ECHO LA AREA 4C: 27.1 CM2
ECHO LA MAJOR AXIS: 5.69 CM
ECHO LA TO AORTIC ROOT RATIO: 1.79
ECHO LA VOL 2C: 110.41 ML (ref 18–58)
ECHO LA VOL 4C: 85.87 ML (ref 18–58)
ECHO LA VOL BP: 108.36 ML (ref 18–58)
ECHO LA VOL/BSA BIPLANE: 50.08 ML/M2 (ref 16–28)
ECHO LA VOLUME INDEX A2C: 51.02 ML/M2 (ref 16–28)
ECHO LA VOLUME INDEX A4C: 39.68 ML/M2 (ref 16–28)
ECHO LV E' LATERAL VELOCITY: 9.18 CM/S
ECHO LV E' SEPTAL VELOCITY: 3.61 CM/S
ECHO LV EDV A2C: 235.6 ML
ECHO LV EDV A4C: 231.3 ML
ECHO LV EDV BP: 238.4 ML (ref 67–155)
ECHO LV EDV INDEX A4C: 106.9 ML/M2
ECHO LV EDV INDEX BP: 110.2 ML/M2
ECHO LV EDV NDEX A2C: 108.9 ML/M2
ECHO LV EJECTION FRACTION A2C: 26 %
ECHO LV EJECTION FRACTION A4C: 32 %
ECHO LV EJECTION FRACTION BIPLANE: 28.8 % (ref 55–100)
ECHO LV ESV A2C: 175.1 ML
ECHO LV ESV A4C: 158.3 ML
ECHO LV ESV BP: 169.7 ML (ref 22–58)
ECHO LV ESV INDEX A2C: 80.9 ML/M2
ECHO LV ESV INDEX A4C: 73.2 ML/M2
ECHO LV ESV INDEX BP: 78.4 ML/M2
ECHO LV INTERNAL DIMENSION DIASTOLIC: 6.97 CM (ref 4.2–5.9)
ECHO LV INTERNAL DIMENSION SYSTOLIC: 6.52 CM
ECHO LV IVSD: 1.18 CM (ref 0.6–1)
ECHO LV MASS 2D: 433.9 G (ref 88–224)
ECHO LV MASS INDEX 2D: 200.5 G/M2 (ref 49–115)
ECHO LV POSTERIOR WALL DIASTOLIC: 1.01 CM (ref 0.6–1)
ECHO LVOT DIAM: 2.48 CM
ECHO LVOT PEAK GRADIENT: 2.6 MMHG
ECHO LVOT PEAK VELOCITY: 81.22 CM/S
ECHO MV A VELOCITY: 27.32 CM/S
ECHO MV AREA PHT: 3.4 CM2
ECHO MV E DECELERATION TIME (DT): 222.9 MS
ECHO MV E VELOCITY: 66.52 CM/S
ECHO MV E/A RATIO: 2.43
ECHO MV E/E' LATERAL: 7.25
ECHO MV E/E' RATIO (AVERAGED): 12.84
ECHO MV E/E' SEPTAL: 18.43
ECHO MV PRESSURE HALF TIME (PHT): 64.6 MS
ECHO MV REGURGITANT PEAK GRADIENT: 55.6 MMHG
ECHO MV REGURGITANT PEAK VELOCITY: 372.71 CM/S
ECHO PULMONARY ARTERY SYSTOLIC PRESSURE (PASP): 24.3 MMHG
ECHO PV MAX VELOCITY: 89.53 CM/S
ECHO PV PEAK GRADIENT: 3.2 MMHG
ECHO RIGHT VENTRICULAR SYSTOLIC PRESSURE (RVSP): 24.3 MMHG
ECHO RV INTERNAL DIMENSION: 4.42 CM
ECHO RV TAPSE: 1.73 CM (ref 1.5–2)
ECHO TV REGURGITANT MAX VELOCITY: 230.95 CM/S
ECHO TV REGURGITANT PEAK GRADIENT: 21.3 MMHG
EOSINOPHIL # BLD: 0.1 K/UL (ref 0–0.4)
EOSINOPHIL NFR BLD: 1 % (ref 0–7)
ERYTHROCYTE [DISTWIDTH] IN BLOOD BY AUTOMATED COUNT: 13.7 % (ref 11.5–14.5)
GLOBULIN SER CALC-MCNC: 4 G/DL (ref 2–4)
GLUCOSE SERPL-MCNC: 100 MG/DL (ref 65–100)
HCT VFR BLD AUTO: 36.7 % (ref 36.6–50.3)
HGB BLD-MCNC: 12.2 G/DL (ref 12.1–17)
IMM GRANULOCYTES # BLD AUTO: 0 K/UL (ref 0–0.04)
IMM GRANULOCYTES NFR BLD AUTO: 0 % (ref 0–0.5)
LYMPHOCYTES # BLD: 1.2 K/UL (ref 0.8–3.5)
LYMPHOCYTES NFR BLD: 18 % (ref 12–49)
MAGNESIUM SERPL-MCNC: 2.4 MG/DL (ref 1.6–2.4)
MCH RBC QN AUTO: 30.4 PG (ref 26–34)
MCHC RBC AUTO-ENTMCNC: 33.2 G/DL (ref 30–36.5)
MCV RBC AUTO: 91.5 FL (ref 80–99)
MONOCYTES # BLD: 0.8 K/UL (ref 0–1)
MONOCYTES NFR BLD: 12 % (ref 5–13)
NEUTS SEG # BLD: 4.4 K/UL (ref 1.8–8)
NEUTS SEG NFR BLD: 68 % (ref 32–75)
NRBC # BLD: 0 K/UL (ref 0–0.01)
NRBC BLD-RTO: 0 PER 100 WBC
PLATELET # BLD AUTO: 184 K/UL (ref 150–400)
PMV BLD AUTO: 9.6 FL (ref 8.9–12.9)
POTASSIUM SERPL-SCNC: 3.7 MMOL/L (ref 3.5–5.1)
PROT SERPL-MCNC: 7.2 G/DL (ref 6.4–8.2)
RBC # BLD AUTO: 4.01 M/UL (ref 4.1–5.7)
SODIUM SERPL-SCNC: 132 MMOL/L (ref 136–145)
WBC # BLD AUTO: 6.5 K/UL (ref 4.1–11.1)

## 2019-06-25 PROCEDURE — 83735 ASSAY OF MAGNESIUM: CPT

## 2019-06-25 PROCEDURE — 74011250637 HC RX REV CODE- 250/637: Performed by: INTERNAL MEDICINE

## 2019-06-25 PROCEDURE — 85025 COMPLETE CBC W/AUTO DIFF WBC: CPT

## 2019-06-25 PROCEDURE — 36415 COLL VENOUS BLD VENIPUNCTURE: CPT

## 2019-06-25 PROCEDURE — 77030020847 HC STATLOK BARD -A

## 2019-06-25 PROCEDURE — 77030020365 HC SOL INJ SOD CL 0.9% 50ML

## 2019-06-25 PROCEDURE — 94760 N-INVAS EAR/PLS OXIMETRY 1: CPT

## 2019-06-25 PROCEDURE — C1751 CATH, INF, PER/CENT/MIDLINE: HCPCS

## 2019-06-25 PROCEDURE — 65660000000 HC RM CCU STEPDOWN

## 2019-06-25 PROCEDURE — 83880 ASSAY OF NATRIURETIC PEPTIDE: CPT

## 2019-06-25 PROCEDURE — 99232 SBSQ HOSP IP/OBS MODERATE 35: CPT | Performed by: INTERNAL MEDICINE

## 2019-06-25 PROCEDURE — 74011250636 HC RX REV CODE- 250/636: Performed by: NURSE PRACTITIONER

## 2019-06-25 PROCEDURE — 36573 INSJ PICC RS&I 5 YR+: CPT | Performed by: NURSE PRACTITIONER

## 2019-06-25 PROCEDURE — 80053 COMPREHEN METABOLIC PANEL: CPT

## 2019-06-25 PROCEDURE — 76937 US GUIDE VASCULAR ACCESS: CPT

## 2019-06-25 PROCEDURE — 02HV33Z INSERTION OF INFUSION DEVICE INTO SUPERIOR VENA CAVA, PERCUTANEOUS APPROACH: ICD-10-PCS | Performed by: INTERNAL MEDICINE

## 2019-06-25 RX ORDER — MILRINONE LACTATE 0.2 MG/ML
0.2 INJECTION, SOLUTION INTRAVENOUS CONTINUOUS
Status: DISCONTINUED | OUTPATIENT
Start: 2019-06-25 | End: 2019-06-26 | Stop reason: HOSPADM

## 2019-06-25 RX ADMIN — Medication 10 ML: at 22:00

## 2019-06-25 RX ADMIN — AMIODARONE HYDROCHLORIDE 200 MG: 200 TABLET ORAL at 09:02

## 2019-06-25 RX ADMIN — AMIODARONE HYDROCHLORIDE 200 MG: 200 TABLET ORAL at 18:17

## 2019-06-25 RX ADMIN — ASPIRIN 81 MG: 81 TABLET ORAL at 09:01

## 2019-06-25 RX ADMIN — SPIRONOLACTONE 25 MG: 25 TABLET ORAL at 09:02

## 2019-06-25 RX ADMIN — TORSEMIDE 40 MG: 20 TABLET ORAL at 09:02

## 2019-06-25 RX ADMIN — Medication 10 ML: at 14:16

## 2019-06-25 RX ADMIN — TORSEMIDE 40 MG: 20 TABLET ORAL at 18:16

## 2019-06-25 RX ADMIN — CARVEDILOL 6.25 MG: 6.25 TABLET, FILM COATED ORAL at 16:54

## 2019-06-25 RX ADMIN — MILRINONE LACTATE IN DEXTROSE 0.2 MCG/KG/MIN: 200 INJECTION, SOLUTION INTRAVENOUS at 16:40

## 2019-06-25 RX ADMIN — CARVEDILOL 6.25 MG: 6.25 TABLET, FILM COATED ORAL at 09:01

## 2019-06-25 RX ADMIN — APIXABAN 5 MG: 5 TABLET, FILM COATED ORAL at 09:02

## 2019-06-25 RX ADMIN — ACETAMINOPHEN 650 MG: 325 TABLET ORAL at 06:36

## 2019-06-25 RX ADMIN — APIXABAN 5 MG: 5 TABLET, FILM COATED ORAL at 18:16

## 2019-06-25 RX ADMIN — Medication 10 ML: at 08:12

## 2019-06-25 NOTE — PROCEDURES
PICC Placement Note : Order received and consent obtained from patient after explaining the reason for  PICC placement, the procedure,risks,benefits and potential complications. An opportunity was provided to ask questions and relay concerns. 5 fr Double Lumen Power Solo PICC was placed using sterile technique and max barrier precautions at patients bedside. Modified Seldinger Technique with ultrasound guidance used to locate the vein. Vein accessed with 21G Introducer Safety Needle and guidewire easily thread. [employer] PICC tip  device used to determine PICC tip direction. PRE-PROCEDURE VERIFICATION Correct Procedure: yes Correct Site:  yes Temperature: Temp: 99.1 °F (37.3 °C), Temperature Source: Temp Source: Oral 
Recent Labs  
  06/25/19 
0510 BUN 44* CREA 2.32*  WBC 6.5 Allergies: Patient has no known allergies. Education materials, including PICC Booklet, for PICC Care given to patient: yes. See Patient Education activity for further details. PROCEDURE DETAIL A double lumen PICC line was started for desire for reliable access and cardiac drip. The following documentation is in addition to the PICC properties in the lines/airways flowsheet : 
Lot #: [id number] Was xylocaine 1% used intradermally:  yes 1 ml used. Catheter Length: 40 (cm) Circumference 32.5cm Vein Selection for PICC:right basilic Central Line Bundle followed yes Complication Related to Insertion: none The placement was verified by ECG technology. PICC is in the SVC per ECG waveform. Waveform documented in the paper chart. Handoff done with Manuel Enriquez RN and PICC placement discussed. Line is okay to use: yes Grady Soto RN

## 2019-06-25 NOTE — PROGRESS NOTES
[address] [facility] in [place], [place] Inpatient Progress Note Patient name: Billy Ramírez Patient : 1950 Patient MRN: [id number] Attending MD: Gavin Fuentes MD 
Date of service: 19 CHIEF COMPLAINT: 
Acute on chronic systolic heart failure HPI:  
Eveline Garcia is a 77 y/o pleasant white male with h/o HTN, HL, likey JOSE, CAD s/p cardiac arrest VFib s/p CABG () c/b sternal would infection and sternotomy, ischemic cardiomyopathy LVEF 15-20%, s/p ICD and with LBBB. Patient admitted with acute on chronic systolic heart failure with massive volume overload > 20 lbs, in the setting of atrial fibrillation s/p failed DCCV and new UTI now treated with IV antibiotics. He underwent DCCV and RHC on . He underwent BiVICD upgrade . His IV milrinone and IV bumex were discontinued, however, has developed worsening renal failure requiring resumption of IV milrinone. He will have a PICC placed to prepare for discharge home with IV milrinone as bridge to recovery. Recent Events: POD 4 - BiVICD upgrade ([employer]) Cr worsening Hyponatremia persists  
   
Plan:  
Continue torsemide 40 mg BID May tolerate decreased diuretic dose once milrinone resumed Resume milrinone 0.2 mcg/kg/min as bridge to recovery/bridge to decision Coreg 6.25 mg po BID Continue spironolactone 25mg daily Continue amiodarone for rate control Continue apixaban for [facility] Continue Repatha Intolerant of Nitrate/hydralazine due to marginal BP Intolerant to ACE/ARB/ARNi due to renal dysfunction Will place PICC for continuous infusion of milrinone at home IMPRESSION: 
Fatigue Shortness of breath Volume overload Pulmonary edema Acute on chronic systolic heart failure Stage C, NYHA class IVsymptoms Coronary artery disease S/p arrest VFib and subsequent CABG  Sternal wound infection requiring sternectomy Ischemic cardiomyopathy, LVEF 20% 10/18 Atrial fibrillation s/p failed DCCV 6/10/19, s/p successful DCCV 6/18/19 Chronic anticoagulation with eliquis S/p BiVICD 6/21/19 Acute on CKD, stage 3 
UTI Urinary retension Anemia, iron deficiency H/o DVT Migraines JACQUE on 2/4/19 showed thrombus H/o tobacco abuse Nighttime desaturations, likely JOSE Body mass index is 27.99 kg/m². CARDIAC IMAGING: 
Echo (5/31/19) LVEF 21-25%, severely dilated LA, moderately dilated RA 
EKG (6/12/19) atrial flutter with occasional PVCs, LBBB QRS 158ms LHC not in epic 
   
OTHER IMAGING: 
CT chest (5/31/19) 1. No evidence of pulmonary embolus. 2. Interstitial edema and small bilateral pleural effusions. 3. Mild mediastinal adenopathy. 
  
General: Alert, oriented, no distress Cardiovascular: NSR, S1 S2, Pulm: Adequate oxygenation on RA, no distress Abdomen: Soft, no mass or tenderness. No organomegaly or hernia. Bowel sounds present. Genitourinary and rectal: deferred Extremities: No cyanosis, clubbing, or edema. Neurologic: No focal sensory or motor deficits are noted. Grossly intact. Psychiatric: Awake, alert an doriented x 3. Appropriate mood and affect. Skin: Warm, dry and well perfused. No lesions, nodules or rashes are noted. REVIEW OF SYSTEMS: 
General: Denies fever, night sweats. Ear, nose and throat: Denies difficulty hearing, sinus problems, runny nose, post-nasal drip, ringing in ears, mouth sores, loose teeth, ear pain, nosebleeds, sore throate, facial pain or numbess Cardiovascular: see above in the interval history Respiratory: Denies cough, wheezing, sputum production, hemoptysis. Dyspnea improved Gastrointestinal: Denies heartburn, constipation, intolerance to certain foods, diarrhea, abdominal pain, nausea, vomiting, difficulty swallowing, blood in stool Kidney and bladder: Denies painful urination, frequent urination, urgency, prostate problems and impotence Musculoskeletal: Denies joint pain, muscle weakness Skin and hair: Denies change in existing skin lesions, hair loss or increase, breast changes PAST MEDICAL HISTORY: 
Past Medical History:  
Diagnosis Date  Degenerative disc disease, lumbar  Heart failure ([address]  High cholesterol  Hypertension  Paroxysmal atrial fibrillation ([address] 2019  Spinal stenosis PAST SURGICAL HISTORY: 
Past Surgical History:  
Procedure Laterality Date  HX APPENDECTOMY  HX CORONARY ARTERY BYPASS GRAFT    
 triple  HX HERNIA REPAIR    
 HX IMPLANTABLE CARDIOVERTER DEFIBRILLATOR  OH CARDIOVERSION ELECTIVE ARRHYTHMIA EXTERNAL N/A 6/10/2019 EP CARDIOVERSION performed by Mj Gamez MD at [address], [facility] [address] CATH LAB  OH CARDIOVERSION ELECTIVE ARRHYTHMIA EXTERNAL N/A 2019 EP CARDIOVERSION performed by Carlos Browne MD at [address] [facility] [address] CATH LAB  OH INSJ/RPLCMT PERM DFB W/TRNSVNS LDS 1/DUAL CHMBR N/A 2019 INSERT ICD BIV MULTI performed by Jose Myles MD at [address] [facility] [address] CATH LAB FAMILY HISTORY: 
History reviewed. No pertinent family history. SOCIAL HISTORY: 
Social History Socioeconomic History  Marital status:  Spouse name: Not on file  Number of children: Not on file  Years of education: Not on file  Highest education level: Not on file Tobacco Use  Smoking status: Former Smoker Last attempt to quit: 2010 Years since quittin.5  Smokeless tobacco: Never Used Substance and Sexual Activity  Alcohol use: Yes Comment: rarely  Drug use: Never LABORATORY RESULTS: 
  
Labs Latest Ref Rng & Units 2019 WBC 4.1 - 11.1 K/uL 6.5 7.1 7.9 - - 6.3 5.3  
RBC 4.10 - 5.70 M/uL 4.01(L) 4.22 4.16 - - 4.07(L) 3.76(L) Hemoglobin 12.1 - 17.0 g/dL 12.2 12.5 12.7 - - 12.3 11. 3(L) Hematocrit 36.6 - 50.3 % 36.7 38.3 37.7 - - 37.3 34. 8(L) MCV 80.0 - 99.0 FL 91.5 90.8 90.6 - - 91.6 92.6 Platelets 452 - 527 K/uL 184 192 207 - - 261 246 Lymphocytes 12 - 49 % 18 - 14 - - - 14 Monocytes 5 - 13 % 12 - 13 - - - 4(L) Eosinophils 0 - 7 % 1 - 1 - - - 1 Basophils 0 - 1 % 1 - 1 - - - 0 Bands 0 - 6 % - - - - - - - Albumin 3.5 - 5.0 g/dL 3.2(L) 3. 1(L) - 3. 3(L) - - -  
Calcium 8.5 - 10.1 MG/DL 9.2 9.2 9.6 9.3 9.3 9.4 8.9 SGOT 15 - 37 U/L 18 12(L) - 25 - - -  
Glucose 65 - 100 mg/dL 100 107(H) 209(H) 88 95 106(H) 96 BUN 6 - 20 MG/DL 44(H) 39(H) 36(H) 31(H) 31(H) 32(H) 32(H) Creatinine 0.70 - 1.30 MG/DL 2.32(H) 2.23(H) 2.37(H) 2.02(H) 2.05(H) 2.14(H) 2.10(H) Sodium 136 - 145 mmol/L 132(L) 132(L) 131(L) 133(L) 132(L) 132(L) 135(L) Potassium 3.5 - 5.1 mmol/L 3.7 3.6 3.6 3.7 3.6 3.7 3.7 TSH 0.36 - 3.74 uIU/mL - - - - - - - Some recent data might be hidden Lab Results Component Value Date/Time TSH 2.45 06/01/2019 04:16 AM  
 
 
CURRENT MEDICATIONS: 
 
Current Facility-Administered Medications:  
  milrinone (PRIMACOR) 20 MG/100 ML D5W infusion, 0.2 mcg/kg/min, IntraVENous, CONTINUOUS, Polliard, Marliss Siemens, NP 
  apixaban (ELIQUIS) tablet 5 mg, 5 mg, Oral, BID, Sole Beckford MD, 5 mg at 06/25/19 2007   torsemide (DEMADEX) tablet 40 mg, 40 mg, Oral, BID, Mounika Altman MD, 40 mg at 06/25/19 1124   sodium chloride (NS) flush 5-40 mL, 5-40 mL, IntraVENous, Q8H, Rubina Souza MD, 10 mL at 06/25/19 1416   sodium chloride (NS) flush 5-40 mL, 5-40 mL, IntraVENous, PRN, Rubina Souza MD 
  oxyCODONE-acetaminophen (PERCOCET) 5-325 mg per tablet 1 Tab, 1 Tab, Oral, Q4H PRN, Rubina Souza MD 
  carvedilol (COREG) tablet 6.25 mg, 6.25 mg, Oral, BID WITH MEALS, Rubina Souza MD, 6.25 mg at 06/25/19 0901 
  evolocumab (REPATHA SURECLICK) pen injection 642 mg (Patient Supplied), 140 mg, SubCUTAneous, Q 14 DAYS, Rubina Souza MD, 140 mg at 06/14/19 4420   spironolactone (ALDACTONE) tablet 25 mg, 25 mg, Oral, DAILY, Boogie Maryam Radford MD, 25 mg at 06/25/19 7476   melatonin tablet 3 mg, 3 mg, Oral, QHS PRN, Sisi Harrell MD, 3 mg at 06/09/19 2125 
  amiodarone (CORDARONE) tablet 200 mg, 200 mg, Oral, BID, Sisi Harrell MD, 200 mg at 06/25/19 8744   aspirin delayed-release tablet 81 mg, 81 mg, Oral, DAILY, Sisi Harrell MD, 81 mg at 06/25/19 2332   sodium chloride (NS) flush 5-40 mL, 5-40 mL, IntraVENous, Q8H, Sisi Harrell MD, 10 mL at 06/25/19 1416   sodium chloride (NS) flush 5-40 mL, 5-40 mL, IntraVENous, PRN, Sisi Harrell MD, 10 mL at 06/03/19 3906   ondansetron (ZOFRAN) injection 4 mg, 4 mg, IntraVENous, Q4H PRN, Sisi Harrell MD 
  bisacodyl (DULCOLAX) tablet 5 mg, 5 mg, Oral, DAILY PRN, Sisi Harrell MD 
  acetaminophen (TYLENOL) tablet 650 mg, 650 mg, Oral, Q4H PRN, Sisi Harrell MD, 650 mg at 06/25/19 2341   morphine injection 2 mg, 2 mg, IntraVENous, Q4H PRN, Sisi Harrell MD 
 
Mino Officer, AGACNPCibola General Hospitaljordin [facility] [address], [address] [place] [address] Office [phone] Fax [phone] Patient seen and examined. Data and note reviewed. I have discussed and agree with the plans as noted. 76year old male with a history of HTN, CAD s/p CABG, ICM who was admitted with acute on chronic systolic heart failure. He underwent BiV upgrade but developed worsening renal function and elevated NT proBNP with weaning of IV milrinone. Recommend discharge home on continuous IV milirnone with close outpatient follow up. Thank you for allowing us to participate in your patient's care. Mounika Gutierrez MD, Rajeev Vargas Chief of Cardiology, [facility] Medical Director [facility] [address], [address] [place], [address]  Office [phone] Fax [phone]

## 2019-06-25 NOTE — PROGRESS NOTES
Problem: Heart Failure: Day 1 Goal: Off Pathway (Use only if patient is Off Pathway) Outcome: Progressing Towards Goal 
Goal: Activity/Safety Outcome: Progressing Towards Goal 
Goal: Consults, if ordered Outcome: Progressing Towards Goal 
Goal: Diagnostic Test/Procedures Outcome: Progressing Towards Goal 
Goal: Nutrition/Diet Outcome: Progressing Towards Goal 
Goal: Discharge Planning Outcome: Progressing Towards Goal 
Goal: Medications Outcome: Progressing Towards Goal 
Goal: Respiratory Outcome: Progressing Towards Goal 
Goal: Treatments/Interventions/Procedures Outcome: Progressing Towards Goal 
Goal: Psychosocial 
Outcome: Progressing Towards Goal 
Goal: *Oxygen saturation within defined limits Outcome: Progressing Towards Goal 
Goal: *Hemodynamically stable Outcome: Progressing Towards Goal 
Goal: *Optimal pain control at patient's stated goal 
Outcome: Progressing Towards Goal 
Goal: *Anxiety reduced or absent Outcome: Progressing Towards Goal 
  
Problem: Heart Failure: Day 2 Goal: Off Pathway (Use only if patient is Off Pathway) Outcome: Progressing Towards Goal 
Goal: Activity/Safety Outcome: Progressing Towards Goal 
Goal: Consults, if ordered Outcome: Progressing Towards Goal 
Goal: Diagnostic Test/Procedures Outcome: Progressing Towards Goal 
Goal: Nutrition/Diet Outcome: Progressing Towards Goal 
Goal: Discharge Planning Outcome: Progressing Towards Goal 
Goal: Medications Outcome: Progressing Towards Goal 
Goal: Respiratory Outcome: Progressing Towards Goal 
Goal: Treatments/Interventions/Procedures Outcome: Progressing Towards Goal 
Goal: Psychosocial 
Outcome: Progressing Towards Goal 
Goal: *Oxygen saturation within defined limits Outcome: Progressing Towards Goal 
Goal: *Hemodynamically stable Outcome: Progressing Towards Goal 
Goal: *Optimal pain control at patient's stated goal 
Outcome: Progressing Towards Goal 
Goal: *Anxiety reduced or absent Outcome: Progressing Towards Goal 
Goal: *Demonstrates progressive activity Outcome: Progressing Towards Goal 
  
Problem: Heart Failure: Day 3 Goal: Off Pathway (Use only if patient is Off Pathway) Outcome: Progressing Towards Goal 
Goal: Activity/Safety Outcome: Progressing Towards Goal 
Goal: Diagnostic Test/Procedures Outcome: Progressing Towards Goal 
Goal: Nutrition/Diet Outcome: Progressing Towards Goal 
Goal: Discharge Planning Outcome: Progressing Towards Goal 
Goal: Medications Outcome: Progressing Towards Goal 
Goal: Respiratory Outcome: Progressing Towards Goal 
Goal: Treatments/Interventions/Procedures Outcome: Progressing Towards Goal 
Goal: Psychosocial 
Outcome: Progressing Towards Goal 
Goal: *Oxygen saturation within defined limits Outcome: Progressing Towards Goal 
Goal: *Hemodynamically stable Outcome: Progressing Towards Goal 
Goal: *Optimal pain control at patient's stated goal 
Outcome: Progressing Towards Goal 
Goal: *Anxiety reduced or absent Outcome: Progressing Towards Goal 
Goal: *Demonstrates progressive activity Outcome: Progressing Towards Goal 
  
Problem: Heart Failure: Day 4 Goal: Off Pathway (Use only if patient is Off Pathway) Outcome: Progressing Towards Goal 
Goal: Activity/Safety Outcome: Progressing Towards Goal 
Goal: Diagnostic Test/Procedures Outcome: Progressing Towards Goal 
Goal: Nutrition/Diet Outcome: Progressing Towards Goal 
Goal: Discharge Planning Outcome: Progressing Towards Goal 
Goal: Medications Outcome: Progressing Towards Goal 
Goal: Respiratory Outcome: Progressing Towards Goal 
Goal: Treatments/Interventions/Procedures Outcome: Progressing Towards Goal 
Goal: Psychosocial 
Outcome: Progressing Towards Goal 
Goal: *Oxygen saturation within defined limits Outcome: Progressing Towards Goal 
Goal: *Hemodynamically stable Outcome: Progressing Towards Goal 
Goal: *Optimal pain control at patient's stated goal 
 Outcome: Progressing Towards Goal 
Goal: *Anxiety reduced or absent Outcome: Progressing Towards Goal 
Goal: *Demonstrates progressive activity Outcome: Progressing Towards Goal 
  
Problem: Heart Failure: Day 5 Goal: Off Pathway (Use only if patient is Off Pathway) Outcome: Progressing Towards Goal 
Goal: Activity/Safety Outcome: Progressing Towards Goal 
Goal: Diagnostic Test/Procedures Outcome: Progressing Towards Goal 
Goal: Nutrition/Diet Outcome: Progressing Towards Goal 
Goal: Discharge Planning Outcome: Progressing Towards Goal 
Goal: Medications Outcome: Progressing Towards Goal 
Goal: Respiratory Outcome: Progressing Towards Goal 
Goal: Treatments/Interventions/Procedures Outcome: Progressing Towards Goal 
Goal: Psychosocial 
Outcome: Progressing Towards Goal 
  
Problem: Heart Failure: Discharge Outcomes Goal: *Demonstrates ability to perform prescribed activity without shortness of breath or discomfort Outcome: Progressing Towards Goal 
Goal: *Left ventricular function assessment completed prior to or during stay, or planned for post-discharge Outcome: Progressing Towards Goal 
Goal: *ACEI prescribed if LVEF less than 40% and no contraindications or ARB prescribed Outcome: Progressing Towards Goal 
Goal: *Verbalizes understanding and describes prescribed diet Outcome: Progressing Towards Goal 
Goal: *Verbalizes understanding/describes prescribed medications Outcome: Progressing Towards Goal 
Goal: *Describes available resources and support systems Description 
(eg: Home Health, Palliative Care, Advanced Medical Directive) Outcome: Progressing Towards Goal 
Goal: *Describes smoking cessation resources Outcome: Progressing Towards Goal 
Goal: *Understands and describes signs and symptoms to report to providers(Stroke Metric) Outcome: Progressing Towards Goal 
Goal: *Describes/verbalizes understanding of follow-up/return appt Description (eg: to physicians, diabetes treatment coordinator, and other resources Outcome: Progressing Towards Goal 
Goal: *Describes importance of continuing daily weights and changes to report to physician Outcome: Progressing Towards Goal

## 2019-06-25 NOTE — PROGRESS NOTES
S Cardiology Progress Note                                        TFNLV Date: 5/31/2019 
  
Pt reports he is feeling pretty good. His weight is stable; urine output is good 
  
Assessment/Plan: # Acute on chronic systolic HF -EF 06% 
- improved initially with diuretics, prev on dobutamine than on milrinone until Friday. His creat has been elevated and slightly up today over yesterday though ProBNP is down. He is willing to go home on milrinone while we see what benefit he is going to see from the [address] upgrade. May need to back off on his diuretics, given his rising BUN.   
# AF -reverted to afib, cont eliquis and amiodarone; in aflutter, CV 5/18; remains in sinus on amiodarone 
- Consider ablation in future once he has been optimized.  
  
# LBBB - BiV ICD/pacer done 6/21; site looks fine. Back on eliquis. No shower until Friday. No lifting arm higher than shoulder for two weeks. He needs a wound check next week-I am away but can get him seen in my absence.  
  
# Acute on chronic kidney injury-renal US normal; cardio-renal;  
  
# fever- urine culture positive with gram-rods; on rocephin-last day wednesday; repeat culture negative; afebrile for several days 
  #urinary retention-urology suggested home with neal but neal removed; urinating fine 
  
# Anemia-iron deficient; stable Exam: 
Blood pressure 100/65, pulse 75, temperature 98.6 °F (37 °C), resp. rate 18, height 6' 2\" (1.88 m), weight 90.7 kg (199 lb 15.3 oz), SpO2 98 %. Wt 199 Lungs clear Cor reg S3 Edema resolved Labs: 
Cr 2.32 
BUN 44 Na 132 Tanna Weiss MD

## 2019-06-25 NOTE — PROGRESS NOTES
[facility] VAD/Heart Failure Progress Note Admit Date: 2019 POD:  4 Days Post-Op Procedure:  Procedure(s): 
INSERT ICD BIV MULTI Subjective:  
Mild dyspnea, fatigue, and weakness; LVAD work-up Objective:  
Vitals: 
Blood pressure 105/73, pulse 75, temperature 99.7 °F (37.6 °C), resp. rate 19, height 6' 2\" (1.88 m), weight 199 lb 15.3 oz (90.7 kg), SpO2 97 %. Temp (24hrs), Av.4 °F (36.9 °C), Min:97.7 °F (36.5 °C), Max:99.7 °F (37.6 °C) Hemodynamics: 
 CO:   
 CI:   
 CVP:   
 SVR:   
 PAP Systolic:   
 PAP Diastolic:   
 PVR:   
 SB93:   
 SCV02:   
 
VAD Interrogation:   
 
EKG/Rhythm:   
 
Extubation Date / Time:  
 
CT Output:  
 
Ventilator: 
Ventilator Volumes Vt Spont (ml): 808 ml (06/15/19 010) Ve Observed (l/min): 18.3 l/min (06/15/19 010) Oxygen Therapy: 
Oxygen Therapy O2 Sat (%): 97 % (19 1128) Pulse via Oximetry: 71 beats per minute (19 1715) O2 Device: Room air (19 0800) O2 Flow Rate (L/min): 2 l/min (19 0302) FIO2 (%): 40 % (06/15/19 010) CXR: 
 
Admission Weight: Last Weight Weight: 221 lb 9 oz (100.5 kg) Weight: 199 lb 15.3 oz (90.7 kg) Intake / Output / Drain: 
Current Shift:  0701 -  1900 In: -  
Out: 200 [Urine:200] Last 24 hrs.:  
 
Intake/Output Summary (Last 24 hours) at 2019 1239 Last data filed at 2019 6169 Gross per 24 hour Intake  Output 1075 ml Net -1075 ml No results for input(s): CPK, CKMB, TROIQ in the last 72 hours. Recent Labs  
  19 
0510 19 
0305 19 
0900 19 
0422 19 
0400 * 132* 131*  --   --   
K 3.7 3.6 3.6  --   --   
CO2 30 32 29  --   --   
BUN 44* 39* 36*  --   --   
CREA 2.32* 2.23* 2.37*  --   --   
 107* 209*  --   --   
MG 2.4 2.2  --   --  2.3 WBC 6.5 7.1  --  7.9  --   
HGB 12.2 12.5  --  12.7  --   
HCT 36.7 38.3  --  37.7  --   
 192  --  207  --   
 
 No results for input(s): INR, PTP, APTT in the last 72 hours. No lab exists for component: INREXT No lab exists for component: PBNP Current Facility-Administered Medications:  
  apixaban (ELIQUIS) tablet 5 mg, 5 mg, Oral, BID, Hessie Prader, MD, 5 mg at 06/25/19 4816   torsemide (DEMADEX) tablet 40 mg, 40 mg, Oral, BID, Mounika Altman MD, 40 mg at 06/25/19 2959   sodium chloride (NS) flush 5-40 mL, 5-40 mL, IntraVENous, Q8H, Say Fowler MD, 10 mL at 06/25/19 3269   sodium chloride (NS) flush 5-40 mL, 5-40 mL, IntraVENous, PRN, Say Fowler MD 
  oxyCODONE-acetaminophen (PERCOCET) 5-325 mg per tablet 1 Tab, 1 Tab, Oral, Q4H PRN, Say Fowler MD 
  carvedilol (COREG) tablet 6.25 mg, 6.25 mg, Oral, BID WITH MEALS, Say Fowler MD, 6.25 mg at 06/25/19 0901 
  evolocumab (REPATHA SURECLICK) pen injection 843 mg (Patient Supplied), 140 mg, SubCUTAneous, Q 14 DAYS, Say Fowler MD, 140 mg at 06/14/19 1735   spironolactone (ALDACTONE) tablet 25 mg, 25 mg, Oral, DAILY, Say Fowler MD, 25 mg at 06/25/19 8862   melatonin tablet 3 mg, 3 mg, Oral, QHS PRN, Say Fowler MD, 3 mg at 06/09/19 2125 
  amiodarone (CORDARONE) tablet 200 mg, 200 mg, Oral, BID, Say Fowler MD, 200 mg at 06/25/19 1963   aspirin delayed-release tablet 81 mg, 81 mg, Oral, DAILY, Say Fowler MD, 81 mg at 06/25/19 8399   sodium chloride (NS) flush 5-40 mL, 5-40 mL, IntraVENous, Q8H, Say Fowler MD, 10 mL at 06/25/19 4439   sodium chloride (NS) flush 5-40 mL, 5-40 mL, IntraVENous, PRN, Say Fowler MD, 10 mL at 06/03/19 8693   ondansetron (ZOFRAN) injection 4 mg, 4 mg, IntraVENous, Q4H PRN, Say Fowler MD 
  bisacodyl (DULCOLAX) tablet 5 mg, 5 mg, Oral, DAILY PRN, Say Fowler MD 
  acetaminophen (TYLENOL) tablet 650 mg, 650 mg, Oral, Q4H PRN, Say Fowler MD, 650 mg at 06/25/19 5042   morphine injection 2 mg, 2 mg, IntraVENous, Q4H PRN, Shona Anderson MD 
 
A/P 
  
NYHA class IV A/C systolic HF - medical Tx 
A/C kidney disease - monitor A-fib - eliquis Urinary retention - monitor 
  
Risk of morbidity and mortality - high Medical decision making - high complexity 
  
 
 
 
Signed By: Jason Torrez MD

## 2019-06-25 NOTE — PROGRESS NOTES
Problem: Heart Failure: Day 5 Goal: Treatments/Interventions/Procedures Outcome: Progressing Towards Goal 
  
 
 
Educated patient on daily weight monitoring, low Na diet, monitoring I&O'S, report weight change 2 lb in a day or 5 lb in a week Last 3 Recorded Weights in this Encounter 06/24/19 0303 06/24/19 1431 06/25/19 0449 Weight: 90 kg (198 lb 6.6 oz) 89.8 kg (198 lb) 90.7 kg (199 lb 15.3 oz) Weight change: -0.188 kg (-6.6 oz)

## 2019-06-25 NOTE — PROGRESS NOTES
A Spiritual Care Partner Volunteer visited patient in Rm 403 on 6/25/2019. Documented by: 
Chaplain Cain MDiv, MS, [facility] 
[phone]

## 2019-06-25 NOTE — PROGRESS NOTES
AP notes patient is a Sound Bundle. CM noted patient may go home on Milrinone per Dr. Sandra Ring note - AP spoke with Angelo Parker NP and she would like CM to just get a quote from infusion company - referral made to [employer] [facility] to check on benefits. AP received a call from Padmaja Lagos with [employer] [facility] - patient has met this years deductible of $185 and has 80% coverage under Medicare and 20% coverage under  for Life as long as patient meets Medicare Criteria - CM made patient, wife and Vijaya, niece aware Zeinab Beck NP with [facility] also made aware - will await further Medicare documentation and needed orders at this time. SUSHIL Escalante CM obtained choice from patient for [facility] and referral made to [facility] health via [facility]. Franco inform agency that patient is a sound bundle patient. SUSHIL Escalante 
 
 
3:30 pm CM informed by Angelo Parker NP that patient to go home on inotrope - info and orders faxed and sent [employer] to [facility] and [facility] [place] [facility] [employer]. Will await Medicare approval for inotrope.   SUSHIL Escalante

## 2019-06-26 ENCOUNTER — DOCUMENTATION ONLY (OUTPATIENT)
Dept: CASE MANAGEMENT | Age: 69
End: 2019-06-26

## 2019-06-26 VITALS
RESPIRATION RATE: 14 BRPM | HEART RATE: 112 BPM | DIASTOLIC BLOOD PRESSURE: 77 MMHG | OXYGEN SATURATION: 98 % | SYSTOLIC BLOOD PRESSURE: 128 MMHG | HEIGHT: 74 IN | TEMPERATURE: 97.4 F | BODY MASS INDEX: 26 KG/M2 | WEIGHT: 202.6 LBS

## 2019-06-26 LAB
ALBUMIN SERPL-MCNC: 3 G/DL (ref 3.5–5)
ALBUMIN/GLOB SERPL: 0.8 {RATIO} (ref 1.1–2.2)
ALP SERPL-CCNC: 86 U/L (ref 45–117)
ALT SERPL-CCNC: 14 U/L (ref 12–78)
ANION GAP SERPL CALC-SCNC: 8 MMOL/L (ref 5–15)
AST SERPL-CCNC: 12 U/L (ref 15–37)
ATRIAL RATE: 65 BPM
ATRIAL RATE: 89 BPM
BILIRUB SERPL-MCNC: 0.5 MG/DL (ref 0.2–1)
BNP SERPL-MCNC: 3082 PG/ML
BUN SERPL-MCNC: 43 MG/DL (ref 6–20)
BUN/CREAT SERPL: 19 (ref 12–20)
CALCIUM SERPL-MCNC: 8.8 MG/DL (ref 8.5–10.1)
CALCULATED R AXIS, ECG10: 30 DEGREES
CALCULATED R AXIS, ECG10: 45 DEGREES
CALCULATED T AXIS, ECG11: 129 DEGREES
CALCULATED T AXIS, ECG11: 147 DEGREES
CHLORIDE SERPL-SCNC: 95 MMOL/L (ref 97–108)
CO2 SERPL-SCNC: 31 MMOL/L (ref 21–32)
COMMENT, HOLDF: NORMAL
CREAT SERPL-MCNC: 2.31 MG/DL (ref 0.7–1.3)
DIAGNOSIS, 93000: NORMAL
DIAGNOSIS, 93000: NORMAL
ERYTHROCYTE [DISTWIDTH] IN BLOOD BY AUTOMATED COUNT: 13.6 % (ref 11.5–14.5)
GLOBULIN SER CALC-MCNC: 3.7 G/DL (ref 2–4)
GLUCOSE SERPL-MCNC: 111 MG/DL (ref 65–100)
HCT VFR BLD AUTO: 34.7 % (ref 36.6–50.3)
HGB BLD-MCNC: 11.4 G/DL (ref 12.1–17)
MAGNESIUM SERPL-MCNC: 2.4 MG/DL (ref 1.6–2.4)
MCH RBC QN AUTO: 30 PG (ref 26–34)
MCHC RBC AUTO-ENTMCNC: 32.9 G/DL (ref 30–36.5)
MCV RBC AUTO: 91.3 FL (ref 80–99)
NRBC # BLD: 0 K/UL (ref 0–0.01)
NRBC BLD-RTO: 0 PER 100 WBC
PLATELET # BLD AUTO: 175 K/UL (ref 150–400)
PMV BLD AUTO: 9.9 FL (ref 8.9–12.9)
POTASSIUM SERPL-SCNC: 3.6 MMOL/L (ref 3.5–5.1)
PROT SERPL-MCNC: 6.7 G/DL (ref 6.4–8.2)
Q-T INTERVAL, ECG07: 448 MS
Q-T INTERVAL, ECG07: 472 MS
QRS DURATION, ECG06: 164 MS
QRS DURATION, ECG06: 168 MS
QTC CALCULATION (BEZET), ECG08: 513 MS
QTC CALCULATION (BEZET), ECG08: 564 MS
RBC # BLD AUTO: 3.8 M/UL (ref 4.1–5.7)
SAMPLES BEING HELD,HOLD: NORMAL
SODIUM SERPL-SCNC: 134 MMOL/L (ref 136–145)
VENTRICULAR RATE, ECG03: 79 BPM
VENTRICULAR RATE, ECG03: 86 BPM
WBC # BLD AUTO: 6.7 K/UL (ref 4.1–11.1)

## 2019-06-26 PROCEDURE — 87086 URINE CULTURE/COLONY COUNT: CPT

## 2019-06-26 PROCEDURE — 83880 ASSAY OF NATRIURETIC PEPTIDE: CPT

## 2019-06-26 PROCEDURE — 36415 COLL VENOUS BLD VENIPUNCTURE: CPT

## 2019-06-26 PROCEDURE — 85027 COMPLETE CBC AUTOMATED: CPT

## 2019-06-26 PROCEDURE — 93005 ELECTROCARDIOGRAM TRACING: CPT

## 2019-06-26 PROCEDURE — 74011250636 HC RX REV CODE- 250/636: Performed by: NURSE PRACTITIONER

## 2019-06-26 PROCEDURE — 87186 SC STD MICRODIL/AGAR DIL: CPT

## 2019-06-26 PROCEDURE — 74011250637 HC RX REV CODE- 250/637: Performed by: INTERNAL MEDICINE

## 2019-06-26 PROCEDURE — 80053 COMPREHEN METABOLIC PANEL: CPT

## 2019-06-26 PROCEDURE — 83735 ASSAY OF MAGNESIUM: CPT

## 2019-06-26 PROCEDURE — 99232 SBSQ HOSP IP/OBS MODERATE 35: CPT | Performed by: INTERNAL MEDICINE

## 2019-06-26 PROCEDURE — 87077 CULTURE AEROBIC IDENTIFY: CPT

## 2019-06-26 RX ORDER — TORSEMIDE 20 MG/1
40 TABLET ORAL 2 TIMES DAILY
Qty: 40 TAB | Refills: 0 | Status: SHIPPED | OUTPATIENT
Start: 2019-06-26 | End: 2019-06-26

## 2019-06-26 RX ORDER — CARVEDILOL 12.5 MG/1
6.25 TABLET ORAL 2 TIMES DAILY WITH MEALS
Qty: 40 TAB | Refills: 0 | Status: SHIPPED | OUTPATIENT
Start: 2019-06-26 | End: 2019-06-26 | Stop reason: SDUPTHER

## 2019-06-26 RX ORDER — CARVEDILOL 6.25 MG/1
6.25 TABLET ORAL 2 TIMES DAILY WITH MEALS
Qty: 40 TAB | Refills: 0 | Status: SHIPPED | OUTPATIENT
Start: 2019-06-26 | End: 2019-07-31 | Stop reason: SDUPTHER

## 2019-06-26 RX ORDER — AMIODARONE HYDROCHLORIDE 200 MG/1
200 TABLET ORAL 2 TIMES DAILY
Qty: 40 TAB | Refills: 0 | Status: SHIPPED | OUTPATIENT
Start: 2019-06-26 | End: 2019-07-31 | Stop reason: SDUPTHER

## 2019-06-26 RX ORDER — TORSEMIDE 20 MG/1
40 TABLET ORAL 2 TIMES DAILY
Qty: 80 TAB | Refills: 0 | Status: SHIPPED | OUTPATIENT
Start: 2019-06-26 | End: 2019-07-03

## 2019-06-26 RX ORDER — MILRINONE LACTATE 0.2 MG/ML
0.2 INJECTION, SOLUTION INTRAVENOUS CONTINUOUS
Qty: 1 ML | Refills: 0 | Status: SHIPPED
Start: 2019-06-26 | End: 2020-01-01

## 2019-06-26 RX ADMIN — Medication 10 ML: at 06:00

## 2019-06-26 RX ADMIN — MILRINONE LACTATE IN DEXTROSE 0.2 MCG/KG/MIN: 200 INJECTION, SOLUTION INTRAVENOUS at 02:47

## 2019-06-26 RX ADMIN — SPIRONOLACTONE 25 MG: 25 TABLET ORAL at 08:48

## 2019-06-26 RX ADMIN — ASPIRIN 81 MG: 81 TABLET ORAL at 08:48

## 2019-06-26 RX ADMIN — ACETAMINOPHEN 650 MG: 325 TABLET ORAL at 03:53

## 2019-06-26 RX ADMIN — CARVEDILOL 6.25 MG: 6.25 TABLET, FILM COATED ORAL at 08:48

## 2019-06-26 RX ADMIN — Medication 10 ML: at 14:00

## 2019-06-26 RX ADMIN — AMIODARONE HYDROCHLORIDE 200 MG: 200 TABLET ORAL at 08:48

## 2019-06-26 RX ADMIN — APIXABAN 5 MG: 5 TABLET, FILM COATED ORAL at 08:48

## 2019-06-26 RX ADMIN — TORSEMIDE 40 MG: 20 TABLET ORAL at 08:47

## 2019-06-26 NOTE — PROGRESS NOTES
Problem: Heart Failure: Day 1 Goal: Diagnostic Test/Procedures Outcome: Progressing Towards Goal 
Goal: Nutrition/Diet Outcome: Progressing Towards Goal 
Goal: Medications Outcome: Progressing Towards Goal 
  
Problem: Heart Failure: Day 2 Goal: Psychosocial 
Outcome: Progressing Towards Goal 
  
Problem: Heart Failure: Discharge Outcomes Goal: *Verbalizes understanding and describes prescribed diet Outcome: Progressing Towards Goal 
  
Bedside and Verbal shift change report given to [address] [place] [address] Celio (oncoming nurse) by Fidelina Kaminski RN (offgoing nurse). Report included the following information SBAR, Kardex, Intake/Output, MAR and Recent Results.

## 2019-06-26 NOTE — PROGRESS NOTES
[address] [facility] [address] [facility] in [place], [place] Inpatient Progress Note Patient name: Rebecca Frost Patient : 1950 Patient MRN: [id number] Attending MD: Leti att. providers found Date of service: 19 CHIEF COMPLAINT: 
Acute on chronic systolic heart failure HPI:  
Shaka Noriega is a 77 y/o pleasant white male with h/o HTN, HL, likey JOSE, CAD s/p cardiac arrest VFib s/p CABG () c/b sternal would infection and sternotomy, ischemic cardiomyopathy LVEF 15-20%, s/p ICD and with LBBB. Patient admitted with acute on chronic systolic heart failure with massive volume overload > 20 lbs, in the setting of atrial fibrillation s/p failed DCCV and new UTI now treated with IV antibiotics. He underwent DCCV and RHC on . He underwent BiVICD upgrade . His IV milrinone and IV bumex were discontinued, however, has developed worsening renal failure requiring resumption of IV milrinone. He will have a PICC placed to prepare for discharge home with IV milrinone as bridge to recovery. Recent Events: 
Milrinone resumed Cr stable Pro-BNP improved  
   
Plan:  
Continue torsemide 40 mg BID Continue milrinone 0.2 mcg/kg/min as bridge to recovery/bridge to decision Coreg 6.25 mg po BID Continue spironolactone 25mg daily Continue amiodarone for rate control Continue apixaban for [facility] [place] Continue Repatha Intolerant of Nitrate/hydralazine due to marginal BP Intolerant to ACE/ARB/ARNi due to renal dysfunction Will follow up with [facility] on  at 1:00 pm  
Needs to be followed closely by Cyn Nielson and Bridget Oshea as an outpatient IMPRESSION: 
Fatigue Shortness of breath Volume overload Pulmonary edema Acute on chronic systolic heart failure Stage C, NYHA class IVsymptoms Coronary artery disease S/p arrest VFib and subsequent CABG  Sternal wound infection requiring sternectomy Ischemic cardiomyopathy, LVEF 20% 10/18 Atrial fibrillation s/p failed DCCV 6/10/19, s/p successful DCCV 6/18/19 Chronic anticoagulation with eliquis S/p BiVICD 6/21/19 Acute on CKD, stage 3 
UTI Urinary retension Anemia, iron deficiency H/o DVT Migraines JACQUE on 2/4/19 showed thrombus H/o tobacco abuse Nighttime desaturations, likely JOSE Body mass index is 27.99 kg/m². CARDIAC IMAGING: 
Echo (5/31/19) LVEF 21-25%, severely dilated LA, moderately dilated RA 
EKG (6/12/19) atrial flutter with occasional PVCs, LBBB QRS 158ms LHC not in epic 
   
OTHER IMAGING: 
CT chest (5/31/19) 1. No evidence of pulmonary embolus. 2. Interstitial edema and small bilateral pleural effusions. 3. Mild mediastinal adenopathy. 
  
General: Alert, oriented, no distress Cardiovascular: NSR, S1 S2, Pulm: Adequate oxygenation on RA, no distress Abdomen: Soft, no mass or tenderness. No organomegaly or hernia. Bowel sounds present. Genitourinary and rectal: deferred Extremities: No cyanosis, clubbing, or edema. Neurologic: No focal sensory or motor deficits are noted. Grossly intact. Psychiatric: Awake, alert an doriented x 3. Appropriate mood and affect. Skin: Warm, dry and well perfused. No lesions, nodules or rashes are noted. REVIEW OF SYSTEMS: 
General: Denies fever, night sweats. Ear, nose and throat: Denies difficulty hearing, sinus problems, runny nose, post-nasal drip, ringing in ears, mouth sores, loose teeth, ear pain, nosebleeds, sore throate, facial pain or numbess Cardiovascular: see above in the interval history Respiratory: Denies cough, wheezing, sputum production, hemoptysis. Dyspnea improved Gastrointestinal: Denies heartburn, constipation, intolerance to certain foods, diarrhea, abdominal pain, nausea, vomiting, difficulty swallowing, blood in stool Kidney and bladder: Denies painful urination, frequent urination, urgency, prostate problems and impotence Musculoskeletal: Denies joint pain, muscle weakness Skin and hair: Denies change in existing skin lesions, hair loss or increase, breast changes PAST MEDICAL HISTORY: 
Past Medical History:  
Diagnosis Date  Degenerative disc disease, lumbar  Heart failure ([facility] [address]  High cholesterol  Hypertension  Paroxysmal atrial fibrillation ([facility] [address] 2019  Spinal stenosis PAST SURGICAL HISTORY: 
Past Surgical History:  
Procedure Laterality Date  HX APPENDECTOMY  HX CORONARY ARTERY BYPASS GRAFT    
 triple  HX HERNIA REPAIR    
 HX IMPLANTABLE CARDIOVERTER DEFIBRILLATOR  CT CARDIOVERSION ELECTIVE ARRHYTHMIA EXTERNAL N/A 6/10/2019 EP CARDIOVERSION performed by Beulah Garcia MD at [address], [facility] [address] CATH LAB  CT CARDIOVERSION ELECTIVE ARRHYTHMIA EXTERNAL N/A 2019 EP CARDIOVERSION performed by Liza Evangelista MD at [address] [address], [facility] [address] CATH LAB  CT INSJ/RPLCMT PERM DFB W/TRNSVNS LDS 1/DUAL CHMBR N/A 2019 INSERT ICD BIV MULTI performed by Imelda Wallace MD at [address] [address], [facility] [address] CATH LAB FAMILY HISTORY: 
History reviewed. No pertinent family history. SOCIAL HISTORY: 
Social History Socioeconomic History  Marital status:  Spouse name: Not on file  Number of children: Not on file  Years of education: Not on file  Highest education level: Not on file Tobacco Use  Smoking status: Former Smoker Last attempt to quit: 2010 Years since quittin.5  Smokeless tobacco: Never Used Substance and Sexual Activity  Alcohol use: Yes Comment: rarely  Drug use: Never LABORATORY RESULTS: 
  
Labs Latest Ref Rng & Units 2019 WBC 4.1 - 11.1 K/uL 6.7 6.5 7.1 7.9 - - 6.3  
RBC 4.10 - 5.70 M/uL 3.80(L) 4.01(L) 4.22 4.16 - - 4.07(L) Hemoglobin 12.1 - 17.0 g/dL 11. 4(L) 12.2 12.5 12.7 - - 12.3 Hematocrit 36.6 - 50.3 % 34. 7(L) 36.7 38.3 37.7 - - 37.3 MCV 80.0 - 99.0 FL 91.3 91.5 90.8 90.6 - - 91.6 Platelets 397 - 702 K/uL 175 184 192 207 - - 261 Lymphocytes 12 - 49 % - 18 - 14 - - - Monocytes 5 - 13 % - 12 - 13 - - - Eosinophils 0 - 7 % - 1 - 1 - - - Basophils 0 - 1 % - 1 - 1 - - - Bands 0 - 6 % - - - - - - - Albumin 3.5 - 5.0 g/dL 3. 0(L) 3. 2(L) 3. 1(L) - 3. 3(L) - -  
Calcium 8.5 - 10.1 MG/DL 8.8 9.2 9.2 9.6 9.3 9.3 9.4 SGOT 15 - 37 U/L 12(L) 18 12(L) - 25 - -  
Glucose 65 - 100 mg/dL 111(H) 100 107(H) 209(H) 88 95 106(H) BUN 6 - 20 MG/DL 43(H) 44(H) 39(H) 36(H) 31(H) 31(H) 32(H) Creatinine 0.70 - 1.30 MG/DL 2.31(H) 2.32(H) 2.23(H) 2.37(H) 2.02(H) 2.05(H) 2.14(H) Sodium 136 - 145 mmol/L 134(L) 132(L) 132(L) 131(L) 133(L) 132(L) 132(L) Potassium 3.5 - 5.1 mmol/L 3.6 3.7 3.6 3.6 3.7 3.6 3.7 TSH 0.36 - 3.74 uIU/mL - - - - - - - Some recent data might be hidden Lab Results Component Value Date/Time TSH 2.45 06/01/2019 04:16 AM  
 
 
CURRENT MEDICATIONS: 
 
Current Facility-Administered Medications:  
  milrinone (PRIMACOR) 20 MG/100 ML D5W infusion, 0.2 mcg/kg/min, IntraVENous, CONTINUOUS, Юлия Herrera, NP, Last Rate: 5.4 mL/hr at 06/26/19 0247, 0.2 mcg/kg/min at 06/26/19 0247   apixaban (ELIQUIS) tablet 5 mg, 5 mg, Oral, BID, Maggie Valadez MD, 5 mg at 06/26/19 2009   torsemide (DEMADEX) tablet 40 mg, 40 mg, Oral, BID, Mounika Altman MD, 40 mg at 06/26/19 0847 
  sodium chloride (NS) flush 5-40 mL, 5-40 mL, IntraVENous, Q8H, Ashtyn Ferrer MD, 10 mL at 06/26/19 1400 
  sodium chloride (NS) flush 5-40 mL, 5-40 mL, IntraVENous, PRN, Ashtyn Ferrer MD 
  oxyCODONE-acetaminophen (PERCOCET) 5-325 mg per tablet 1 Tab, 1 Tab, Oral, Q4H PRN, Ashtyn Ferrer MD 
  carvedilol (COREG) tablet 6.25 mg, 6.25 mg, Oral, BID WITH MEALS, Ashtyn Ferrer MD, 6.25 mg at 06/26/19 1344   evolocumab (REPATHA SURECLICK) pen injection 823 mg (Patient Supplied), 140 mg, SubCUTAneous, Q 14 DAYS, Ashtyn Ferrer MD, 140 mg at 06/14/19 5445   spironolactone (ALDACTONE) tablet 25 mg, 25 mg, Oral, DAILY, Jv Morris MD, 25 mg at 06/26/19 4938   melatonin tablet 3 mg, 3 mg, Oral, QHS PRN, Jv Morris MD, 3 mg at 06/09/19 2125 
  amiodarone (CORDARONE) tablet 200 mg, 200 mg, Oral, BID, Jv Morris MD, 200 mg at 06/26/19 6510   aspirin delayed-release tablet 81 mg, 81 mg, Oral, DAILY, Jv Morris MD, 81 mg at 06/26/19 3556   sodium chloride (NS) flush 5-40 mL, 5-40 mL, IntraVENous, Q8H, Jv Morris MD, 10 mL at 06/26/19 1400 
  sodium chloride (NS) flush 5-40 mL, 5-40 mL, IntraVENous, PRN, Jv Morris MD, 10 mL at 06/03/19 2901   ondansetron (ZOFRAN) injection 4 mg, 4 mg, IntraVENous, Q4H PRN, Jv Morris MD 
  bisacodyl (DULCOLAX) tablet 5 mg, 5 mg, Oral, DAILY PRN, Jv Morris MD 
  acetaminophen (TYLENOL) tablet 650 mg, 650 mg, Oral, Q4H PRN, Jv Morris MD, 650 mg at 06/26/19 8405   morphine injection 2 mg, 2 mg, IntraVENous, Q4H PRN, Jv Morris MD 
 
Current Outpatient Medications:  
  amiodarone (CORDARONE) 200 mg tablet, Take 1 Tab by mouth two (2) times a day., Disp: 40 Tab, Rfl: 0 
  milrinone (PRIMACOR) 20 mg/100 mL (200 mcg/mL) infusion, 18.14 mcg/min by IntraVENous route continuous. , Disp: 1 mL, Rfl: 0 
  torsemide (DEMADEX) 20 mg tablet, Take 2 Tabs by mouth two (2) times a day., Disp: 80 Tab, Rfl: 0 
  carvedilol (COREG) 6.25 mg tablet, Take 1 Tab by mouth two (2) times daily (with meals). , Disp: 40 Tab, Rfl: 0 
  amiodarone (CORDARONE) 200 mg tablet, Take 200 mg by mouth daily. , Disp: , Rfl:  
  aspirin delayed-release 81 mg tablet, Take 81 mg by mouth daily. , Disp: , Rfl:  
  apixaban (ELIQUIS) 5 mg tablet, Take 5 mg by mouth two (2) times a day., Disp: , Rfl:  
  glucosamine/chondr vyas A sod (OSTEO BI-FLEX PO), Take 1 Tab by mouth two (2) times a day., Disp: , Rfl:  
   evolocumab (REPATHA SURECLICK) pen injection, 140 mg by SubCUTAneous route every fourteen (14) days. , Disp: , Rfl:  
  spironolactone (ALDACTONE) 25 mg tablet, Take 25 mg by mouth daily. , Disp: , Rfl:  
 
Krystina Edwards, AGACNP-[facility] [address], [address] Office [phone] Fax [phone] Patient seen and examined. Data and note reviewed. I have discussed and agree with the plans as noted. 76year old male with a history of HTN, CAD s/p CABG, ICM who was admitted with acute on chronic systolic heart failure. He underwent BiV upgrade but developed worsening renal function and elevated NT proBNP with weaning of IV milrinone. Discharge home on continuous IV milirnone with close outpatient follow up. Thank you for allowing us to participate in your patient's care. Mounika Jones MD, Mellissa Schlatter Chief of Cardiology, [facility] Medical Director [facility] [address], [address] [place] [address] Office [phone] Fax [phone]

## 2019-06-26 NOTE — PROGRESS NOTES
Chart reviewed. Will get ECG this AM to evaluate QRS morphology and duration with current BIV pacing algorithm. Otherwise, no change.

## 2019-06-26 NOTE — PROGRESS NOTES
Reviewed ECG, he's in Afib now so the current BIV pacing algorithm [facility]) which uses a fusion strategy (intrinsic conduction with preexcitation) will have difficulty, it will automatically change in mode switch to conventional BIV pacing. This is why the QRS is wider than when in sinus. He should be in rate adaptive mode. I called the patient and wife and explained the above. He's being discharged now. All questions answered. Signed By: Johan Bennett MD   
 June 26, 2019

## 2019-06-26 NOTE — PROGRESS NOTES
Patient discharged with nurse on milrinone drip and PICC line. AVS given Will follow up with cardiology for Afib Problem: Heart Failure: Day 5 Goal: Treatments/Interventions/Procedures Outcome: Progressing Towards Goal 
  
 
 
 
 
Last 3 Recorded Weights in this Encounter 06/24/19 1431 06/25/19 0449 06/26/19 4300 Weight: 89.8 kg (198 lb) 90.7 kg (199 lb 15.3 oz) 91.9 kg (202 lb 9.6 oz) Weight change: 2.088 kg (4 lb 9.6 oz) Patient had 2 lb weight gain from yesterday, will continue to monitor Educated on daily weight monitoring, low Na diet, monitoring I&O's. Will continue to monitor

## 2019-06-26 NOTE — PROGRESS NOTES
CM received message from Sherren Pickles with [facility] that patient has been approved for home inotrope - [facility]  can accept for SN - message sent to Joe [place] NP to see if patient is ready for discharge today so [facility] can mix/deliver meds and provide teaching to patient and family prior to discharge to home. SUSHIL Sinclair 
 
 
CM received clearance from [facility] - Dr. Dolores Zarate made aware. [facility] to deliver meds and Sherren Pickles to provide teaching with patient. Wife and Vijaya, niece prior to discharge to home. Mariaelena Santos with [facility]  made aware of discharge to [facility] today.   SUSHIL Sinclair

## 2019-06-26 NOTE — PROGRESS NOTES
Transitional Care Team: Discharge HUG Note    Date of Assessment: 06/26/19  Time of Assessment:  6:44 PM    Assessment & Plan   No AMD on file--have spoken with  and Mrs. Kiser and encouraged them to complete this important document after discharge  No PCP appt--Mrs. Anmol Gutiérrez states that they will be staying with Dr. Anya Palafox since he knows Mr. Anmol Gutiérrez so well; encouraged them to make F/U appt as soon as possible. Requesting BS NN since Dr. Nieves Willis is on his team and is with BS--waiting for this to be assigned       Primary Diagnoses:   Acute on chronic systolic heart failure, Stage C, NYHA class IV symptoms  Ischemic cardiomyopathy, LVEF 20% 10/18  Coronary artery disease  S/p arrest VFib and subsequent CABG 2010  Atrial fibrillation, s/p failed DCCV 6/10/19, s/p successful DCCV 6/18/19   S/p BiVICD 6/21/19  Chronic anticoagulation with eliquis  Acute on CKD, stage 3    Advance Directive: does not have AMD on file; did not have opportunity to help he and his wife complete during admission; encouraged them to complete as above in yellow box    Medication reconciliation was not performed at discharge. Discharge Needs: none known at this time; returning home with his wife; will have home infusion company to assist with Milrinone drip and HH; has early F/U appt with Dr. Nieves Willis and her NP on 7/1. Wife will make F/U appts with Dr. Anya Palafox and Dr. Jacques Warner. They live in [facility] outside of [facility]'s service area. [facility] NP left patient with [facility] calender/follow up appointment with Dr. Nieves Willis; BS NN has not been assigned yet, but has been requested.      Past Medical History:   Diagnosis Date    Degenerative disc disease, lumbar     Heart failure ([address]     High cholesterol     Hypertension     Paroxysmal atrial fibrillation ([address] 4/2/2019    Spinal stenosis        Advance Care Planning 6/1/2019   Patient's Healthcare Decision Maker is: Legal Next of Kin   Confirm Advance Directive None

## 2019-06-26 NOTE — PROGRESS NOTES
[facility] Cardiology Progress Note                                        ZZQNR Date: 5/31/2019 
  
Pt reports he is feeling pretty good. He did have fever of 100 early this am which is down now PICC line placed   
Assessment/Plan: # Acute on chronic systolic HF -EF 48% 
- improved initially with diuretics, prev on dobutamine then on milrinone. He is back on milrinone with plans to go home on the drip to aid in recoviery. Follow up     plan to be determined with AHF 
  
# AF -reverted to afib again (third time) after a week, cont eliquis and amiodarone;  
- Consider ablation in future once he has been optimized.  
  
# LBBB - BiV ICD/pacer done 6/21; site looks fine. Back on eliquis. No shower until Friday. No lifting arm higher than shoulder for two weeks. He needs a wound check next week-I am away but can get him seen in my absence.  
  
# Acute on chronic kidney injury-renal US normal; cardio-renal; stable at present 
  
# fever- urine culture positive with gram-rods; on rocephin-last day 2 weeks ago; repeat culture negative; Due to recurrent fever, will culture his urine.  
  
# urinary retention-urology suggested home with neal but neal removed; urinating fine 
  
# Anemia-iron deficient; stable Labs reviewed: 
Cr no change; Na 134 BNP down slightly EKG: Afib Ema Byrne MD

## 2019-06-26 NOTE — PROGRESS NOTES
Hospitalist Progress Note Please call  and page for questions. Call physician on-call through the  7pm-7am 
 
Daily Progress Note: 6/25/2019 Primary care David Veliz MD 
 
Date of admission: 5/31/2019  7:03 PM 
 
Admission summery and hospital course: 
80-year-old man with past medical history significant for chronic systolic congestive heart failure, dyslipidemia, hypertension, atrial fibrillation status post cardioversion, was in his usual state of health until the day of presentation at the emergency room when the patient developed worsening of his shortness of breath.  The inpatient underwent cardioversion early this morning by his cardiologist. 
Uyen Lavinia started on 06/05. 
  
Subjective:  
F/u for heart failure Patient is seen and examined at bedside. He denied any SOB,chest pain,LE edema. Asked when he could go home -explained to him and his wife that cardiology team wants him to go home on milrinone and we are in the process of arranging it Assessment/Plan:  
Acute-on-chronic systolic congestive heart failure NYHA 2-3 - improving JACQUE on 05/31 with EF of 21%-25%.      
CTA of the chest is negative for pulmonary embolism.  
Cardiology and AHF team following Discontinued  milrinone and bumex drip 6/22. RHC done 6/18 - elevated pressures C/w po aldactone, coreg and BID toresemide Saturating on RA 
-As cr slightly trended up -cardiology team wants the patient to be discharged home on milrinone .- aware,will need home health,PICC line. 
  
Atrial fibrillation, status post cardioversion 6/18   
Aflutter - reverted to afib 
- he had cardioversions in the past 
- c/w amiodarone. On  Eliquis LBBB - upgraded to BiV ICD/pacer 6/22.   
 
Hypertension. stable . Acute kidney injury on CKD   
Creatinine worsened,likely due to low EF and now off milrinone - Renal US: 06/05 was normal.  
- avoid nephrotoxins - will monitor Hypokalemia- resolved Anemia-iron deficient - hb stable UTI:  
Afebrile. Completed Rocephin.  
Ucx: Enterobacter Cloacae Urinary retention- resolved, off neal Insomnia: melatonin. 
  
VTE prophylaxis: Eliquis Code status: Full Discussed plan of care with Patient/Family and Nurse.  
Pre-admission lived at home. Discharge planning: TBD. outpt cardiac rehab. Discharge plan per heart failure team.   
 
 
Review of Systems:  
 
Review of Systems: 
Symptom  Y/N  Comments   Symptom  Y/N  Comments Fever/Chills   n   Chest Pain  n   
Poor Appetite  n    Edema  n    
Cough  n   Abdominal Pain  n    
Sputum  n   Joint Pain SOB/DOUGLAS  N Improved   Pruritis/Rash Nausea/vomit  n   Tolerating PT/OT Diarrhea     Tolerating Diet  y Constipation     Other Could not obtain due to:    
 
 
Objective:  
Physical Exam:  
 
Visit Vitals BP 98/71 (BP 1 Location: Left arm, BP Patient Position: Sitting) Pulse 77 Temp 98.6 °F (37 °C) Resp 20 Ht 6' 2\" (1.88 m) Wt 90.7 kg (199 lb 15.3 oz) SpO2 100% BMI 25.67 kg/m² O2 Flow Rate (L/min): 2 l/min O2 Device: Room air Temp (24hrs), Av.7 °F (37.1 °C), Min:97.7 °F (36.5 °C), Max:99.7 °F (37.6 °C) 
  1901 -  0700 In: -  
Out: 200 [Urine:200]    0701 -  190 In: -  
[facility] [[id number]] General:  Alert, cooperative, no distress, appears stated age. Lungs:     clear eldon bases Heart:  Regular rate and rhythm, S1, S2 normal, no murmur.   
Abdomen:   Soft, non-tender. Bowel sounds normal.   
Extremities: Extremities normal, atraumatic, no cyanosis. No LE edema. Skin: Skin color, texture, turgor normal. No rashes or lesions Neurologic: Grossly normal.   
  
Data Review:  
   
Recent Days: 
Recent Labs  
  19 
0510 19 
0305 19 
0422 WBC 6.5 7.1 7.9 HGB 12.2 12.5 12.7 HCT 36.7 38.3 37.7  192 207 Recent Labs  
  19 
0510 19 
0305 19 0900 06/23/19 
0400 * 132* 131*  --   
K 3.7 3.6 3.6  --   
CL 95* 93* 92*  --   
CO2 30 32 29  --   
 107* 209*  --   
BUN 44* 39* 36*  --   
CREA 2.32* 2.23* 2.37*  --   
CA 9.2 9.2 9.6  --   
MG 2.4 2.2  --  2.3 ALB 3.2* 3.1*  --   --   
SGOT 18 12*  --   --   
ALT 14 12  --   -- No results for input(s): PH, PCO2, PO2, HCO3, FIO2 in the last 72 hours. 24 Hour Results: 
Recent Results (from the past 24 hour(s)) NT-PRO BNP Collection Time: 06/25/19  5:10 AM  
Result Value Ref Range NT pro-BNP 3,401 (H) <125 PG/ML  
MAGNESIUM Collection Time: 06/25/19  5:10 AM  
Result Value Ref Range Magnesium 2.4 1.6 - 2.4 mg/dL METABOLIC PANEL, COMPREHENSIVE Collection Time: 06/25/19  5:10 AM  
Result Value Ref Range Sodium 132 (L) 136 - 145 mmol/L Potassium 3.7 3.5 - 5.1 mmol/L Chloride 95 (L) 97 - 108 mmol/L  
 CO2 30 21 - 32 mmol/L Anion gap 7 5 - 15 mmol/L Glucose 100 65 - 100 mg/dL BUN 44 (H) 6 - 20 MG/DL Creatinine 2.32 (H) 0.70 - 1.30 MG/DL  
 BUN/Creatinine ratio 19 12 - 20 GFR est AA 34 (L) >60 ml/min/1.73m2 GFR est non-AA 28 (L) >60 ml/min/1.73m2 Calcium 9.2 8.5 - 10.1 MG/DL Bilirubin, total 0.6 0.2 - 1.0 MG/DL  
 ALT (SGPT) 14 12 - 78 U/L  
 AST (SGOT) 18 15 - 37 U/L Alk. phosphatase 90 45 - 117 U/L Protein, total 7.2 6.4 - 8.2 g/dL Albumin 3.2 (L) 3.5 - 5.0 g/dL Globulin 4.0 2.0 - 4.0 g/dL A-G Ratio 0.8 (L) 1.1 - 2.2    
CBC WITH AUTOMATED DIFF Collection Time: 06/25/19  5:10 AM  
Result Value Ref Range WBC 6.5 4.1 - 11.1 K/uL  
 RBC 4.01 (L) 4.10 - 5.70 M/uL  
 HGB 12.2 12.1 - 17.0 g/dL HCT 36.7 36.6 - 50.3 % MCV 91.5 80.0 - 99.0 FL  
 MCH 30.4 26.0 - 34.0 PG  
 MCHC 33.2 30.0 - 36.5 g/dL  
 RDW 13.7 11.5 - 14.5 % PLATELET 594 360 - 911 K/uL MPV 9.6 8.9 - 12.9 FL  
 NRBC 0.0 0  WBC ABSOLUTE NRBC 0.00 0.00 - 0.01 K/uL NEUTROPHILS 68 32 - 75 % LYMPHOCYTES 18 12 - 49 % MONOCYTES 12 5 - 13 % EOSINOPHILS 1 0 - 7 % BASOPHILS 1 0 - 1 % IMMATURE GRANULOCYTES 0 0.0 - 0.5 % ABS. NEUTROPHILS 4.4 1.8 - 8.0 K/UL  
 ABS. LYMPHOCYTES 1.2 0.8 - 3.5 K/UL  
 ABS. MONOCYTES 0.8 0.0 - 1.0 K/UL  
 ABS. EOSINOPHILS 0.1 0.0 - 0.4 K/UL  
 ABS. BASOPHILS 0.1 0.0 - 0.1 K/UL  
 ABS. IMM. GRANS. 0.0 0.00 - 0.04 K/UL  
 DF AUTOMATED Problem List: 
Problem List as of 6/25/2019 Date Reviewed: 6/10/2019 Codes Class Noted - Resolved * (Principal) Acute on chronic systolic CHF (congestive heart failure) (HCC) ICD-10-CM: Z59.19 ICD-9-CM: 428.23, 428.0  5/31/2019 - Present Paroxysmal atrial fibrillation (HCC) ICD-10-CM: I48.0 ICD-9-CM: 427.31  4/2/2019 - Present Medications reviewed Current Facility-Administered Medications Medication Dose Route Frequency  milrinone (PRIMACOR) 20 MG/100 ML D5W infusion  0.2 mcg/kg/min IntraVENous CONTINUOUS  
 apixaban (ELIQUIS) tablet 5 mg  5 mg Oral BID  torsemide (DEMADEX) tablet 40 mg  40 mg Oral BID  sodium chloride (NS) flush 5-40 mL  5-40 mL IntraVENous Q8H  
 sodium chloride (NS) flush 5-40 mL  5-40 mL IntraVENous PRN  
 oxyCODONE-acetaminophen (PERCOCET) 5-325 mg per tablet 1 Tab  1 Tab Oral Q4H PRN  
 carvedilol (COREG) tablet 6.25 mg  6.25 mg Oral BID WITH MEALS  evolocumab (REPATHA SURECLICK) pen injection 519 mg (Patient Supplied)  140 mg SubCUTAneous Q 14 DAYS  spironolactone (ALDACTONE) tablet 25 mg  25 mg Oral DAILY  melatonin tablet 3 mg  3 mg Oral QHS PRN  
 amiodarone (CORDARONE) tablet 200 mg  200 mg Oral BID  aspirin delayed-release tablet 81 mg  81 mg Oral DAILY  sodium chloride (NS) flush 5-40 mL  5-40 mL IntraVENous Q8H  
 sodium chloride (NS) flush 5-40 mL  5-40 mL IntraVENous PRN  
 ondansetron (ZOFRAN) injection 4 mg  4 mg IntraVENous Q4H PRN  
 bisacodyl (DULCOLAX) tablet 5 mg  5 mg Oral DAILY PRN  
 acetaminophen (TYLENOL) tablet 650 mg  650 mg Oral Q4H PRN  
  morphine injection 2 mg  2 mg IntraVENous Q4H PRN Care Plan discussed with: Patient/Family and Nurse,cardiology team 
 
 
 
Ashley Juarez MD

## 2019-06-26 NOTE — PROGRESS NOTES
[facility] VAD/Heart Failure Progress Note Admit Date: 2019 POD:  5 Days Post-Op Procedure:  Procedure(s): 
INSERT ICD BIV MULTI Subjective: Dyspnea, fatigue, and weakness; should be able to enter chest and keep the pectoralis flap to the right of midline if LVAD planned;  
 
 Objective:  
Vitals: 
Blood pressure 128/77, pulse (!) 112, temperature 97.4 °F (36.3 °C), resp. rate 14, height 6' 2\" (1.88 m), weight 202 lb 9.6 oz (91.9 kg), SpO2 98 %. Temp (24hrs), Av.6 °F (37 °C), Min:97.4 °F (36.3 °C), Max:100.3 °F (37.9 °C) Hemodynamics: 
 CO:   
 CI:   
 CVP:   
 SVR:   
 PAP Systolic:   
 PAP Diastolic:   
 PVR:   
 DO01:   
 SCV02:   
 
VAD Interrogation:   
 
EKG/Rhythm:   
 
Extubation Date / Time:  
 
CT Output:  
 
Ventilator: 
Ventilator Volumes Vt Spont (ml): 808 ml (06/15/19 010) Ve Observed (l/min): 18.3 l/min (06/15/19 010) Oxygen Therapy: 
Oxygen Therapy O2 Sat (%): 98 % (19 1117) Pulse via Oximetry: 71 beats per minute (19 1715) O2 Device: Room air (19 0802) O2 Flow Rate (L/min): 2 l/min (19 0347) FIO2 (%): 40 % (06/15/19 010) CXR: 
 
Admission Weight: Last Weight Weight: 221 lb 9 oz (100.5 kg) Weight: 202 lb 9.6 oz (91.9 kg) Intake / Output / Drain: 
Current Shift:  0701 -  1900 In: 449.9 [I.V.:449.9] Out: 100 [Urine:100] Last 24 hrs.:  
 
Intake/Output Summary (Last 24 hours) at 2019 1220 Last data filed at 2019 1221 Gross per 24 hour Intake 449.93 ml Output 1210 ml Net -760.07 ml No results for input(s): CPK, CKMB, TROIQ in the last 72 hours. Recent Labs  
  19 
0300 19 
0510 19 
0305 * 132* 132* K 3.6 3.7 3.6 CO2 31 30 32 BUN 43* 44* 39* CREA 2.31* 2.32* 2.23* * 100 107* MG 2.4 2.4 2.2 WBC 6.7 6.5 7.1 HGB 11.4* 12.2 12.5 HCT 34.7* 36.7 38.3  184 192 No results for input(s): INR, PTP, APTT in the last 72 hours. No lab exists for component: INREXT No lab exists for component: PBNP Current Facility-Administered Medications:  
  milrinone (PRIMACOR) 20 MG/100 ML D5W infusion, 0.2 mcg/kg/min, IntraVENous, CONTINUOUS, Kalyan Herrera T, NP, Last Rate: 5.4 mL/hr at 06/26/19 0247, 0.2 mcg/kg/min at 06/26/19 0247   apixaban (ELIQUIS) tablet 5 mg, 5 mg, Oral, BID, David Barrow MD, 5 mg at 06/26/19 9405   torsemide (DEMADEX) tablet 40 mg, 40 mg, Oral, BID, Mounika Altman MD, 40 mg at 06/26/19 0847 
  sodium chloride (NS) flush 5-40 mL, 5-40 mL, IntraVENous, Q8H, Jv Morris MD, 10 mL at 06/26/19 0600 
  sodium chloride (NS) flush 5-40 mL, 5-40 mL, IntraVENous, PRN, Jv Morris MD 
  oxyCODONE-acetaminophen (PERCOCET) 5-325 mg per tablet 1 Tab, 1 Tab, Oral, Q4H PRN, Jv Morris MD 
  carvedilol (COREG) tablet 6.25 mg, 6.25 mg, Oral, BID WITH MEALS, Jv Morris MD, 6.25 mg at 06/26/19 1922   evolocumab (REPATHA SURECLICK) pen injection 693 mg (Patient Supplied), 140 mg, SubCUTAneous, Q 14 DAYS, Jv Morris MD, 140 mg at 06/14/19 1735   spironolactone (ALDACTONE) tablet 25 mg, 25 mg, Oral, DAILY, Jv Morris MD, 25 mg at 06/26/19 0963   melatonin tablet 3 mg, 3 mg, Oral, QHS PRN, Jv Morris MD, 3 mg at 06/09/19 2127 
  amiodarone (CORDARONE) tablet 200 mg, 200 mg, Oral, BID, Jv Morris MD, 200 mg at 06/26/19 3853   aspirin delayed-release tablet 81 mg, 81 mg, Oral, DAILY, Jv Morris MD, 81 mg at 06/26/19 4668   sodium chloride (NS) flush 5-40 mL, 5-40 mL, IntraVENous, Q8H, Rico Hyman MD, 10 mL at 06/26/19 0600 
  sodium chloride (NS) flush 5-40 mL, 5-40 mL, IntraVENous, PRN, Jv Morris MD, 10 mL at 06/03/19 7298   ondansetron (ZOFRAN) injection 4 mg, 4 mg, IntraVENous, Q4H PRN, Jv Morris MD 
   bisacodyl (DULCOLAX) tablet 5 mg, 5 mg, Oral, DAILY PRN, Refugio Tapia MD 
  acetaminophen (TYLENOL) tablet 650 mg, 650 mg, Oral, Q4H PRN, Refugio Tapia MD, 650 mg at 06/26/19 5859   morphine injection 2 mg, 2 mg, IntraVENous, Q4H PRN, Refugio Tapia MD 
 
A/P 
  
NYHA class IV A/C systolic HF - medical Tx 
A/C kidney disease - monitor A-fib - eliquis Urinary retention - monitor 
  
Risk of morbidity and mortality - high Medical decision making - high complexity 
  
 
 
 
Signed By: Monica Salgado MD

## 2019-06-27 ENCOUNTER — TELEPHONE (OUTPATIENT)
Dept: CASE MANAGEMENT | Age: 69
End: 2019-06-27

## 2019-06-27 NOTE — TELEPHONE ENCOUNTER
Acute Care - Physical Therapy Treatment Note  [facility]     Patient Name: Agnieszka Rizzo  : 1930  MRN: [id number]  Today's Date: 2017  Onset of Illness/Injury or Date of Surgery Date: 17          Admit Date: 2017    Visit Dx:    ICD-10-CM ICD-9-CM   1. Pancolitis K51.00 556.6   2. Leukocytosis, unspecified type D72.829 288.60   3. Generalized weakness R53.1 780.79     Patient Active Problem List   Diagnosis   • Pneumothorax of right lung after biopsy   • Pulmonary aspergillosis   • Benign essential hypertension   • Chronic coronary artery disease   • Hyperlipidemia   • Mitral valve insufficiency   • Ventricular premature beats   • Ventricular tachycardia   • Chronic atrial fibrillation   • Pneumonia   • Pneumonia with the fungal infection aspergillosis   • Acute respiratory failure with hypoxia   • Acid-fast bacteria present   • Hyponatremia   • C. difficile colitis   • DNR (do not resuscitate)   • Sepsis   • Chronic diastolic CHF (congestive heart failure)   • CHF (congestive heart failure)   • Pancolitis   • Asymptomatic bacteriuria   • Iron deficiency anemia   • Hypokalemia               Adult Rehabilitation Note       17 1000 17 1100       Rehab Assessment/Intervention    Discipline physical therapy assistant  - physical therapy assistant  -     Document Type therapy note (daily note)  - therapy note (daily note)  -CW     Subjective Information no complaints  - agree to therapy;complains of;fatigue  -CW     Patient Effort, Rehab Treatment good  -RH good  -CW     Precautions/Limitations fall precautions  -RH fall precautions  -CW     Recorded by [RH] Jay Rivers PTA [CW] Colt Paniagua     Vital Signs    O2 Delivery Pre Treatment room air  -RH supplemental O2  -CW     Recorded by [RH] Jay Rivers PTA [CW] Colt Paniagua     Pain Assessment    Pain Assessment No/denies pain  -RH No/denies pain  -CW     Recorded by [RH] Jay Rivers PTA [CW]  JARVISNN contacted patient for post discharge followup phone call. ([phone]) States is doing well since being discharged. [facility] home health nurses have contacted him and are en route to his home. Patient was discharged to home on IV Milrinone with followup with [facility] and [facility]. Denies concerns with obtaining prescriptions. [facility] has removed the medications that have been discontinued so not to confuse with new ones. Confirmed appts with [facility], 7/1/19 @ 1pm with Meeta Garcia NP. States his wife will be providing transportation for appt. [facility] has contacted Dr. Roxie Nguyen office/[facility] for confirmation of followup appt. All questions answered to patient satisfaction.   Swathi Forrester RN-CHFN/LIZZY Colt Paniagua     Cognitive Assessment/Intervention    Current Cognitive/Communication Assessment functional  -RH functional  -CW     Orientation Status  oriented x 4  -CW     Follows Commands/Answers Questions  100% of the time  -CW     Personal Safety  WNL/WFL  -CW     Personal Safety Interventions  fall prevention program maintained;gait belt;nonskid shoes/slippers when out of bed  -CW     Recorded by [RH] Jay Rivers PTA [CW] Colt Paniagua     ROM (Range of Motion)    General ROM no range of motion deficits identified  -RH      Recorded by [RH] Jay Rivers PTA      Bed Mobility, Assessment/Treatment    Bed Mob, Supine to Sit, [place] independent  -RH supervision required  -CW     Bed Mob, Sit to Supine, [place] independent  -RH supervision required  -CW     Recorded by [RH] Jay Rivers PTA [CW] Colt Paniagua     Transfer Assessment/Treatment    Transfers, Bed-Chair [place] conditional independence;stand by assist  -RH      Transfers, Chair-Bed [place] conditional independence;stand by assist  -RH      Transfers, Bed-Chair-Bed, Assist Device standard walker  -RH      Transfers, Sit-Stand [place] conditional independence  -RH stand by assist  -CW     Transfers, Stand-Sit [place] conditional independence  -RH stand by assist  -CW     Transfers, Sit-Stand-Sit, Assist Device rolling walker  -RH rolling walker  -CW     Recorded by [RH] Jay Rivers, EUGENIE [CW] Colt Paniagua     Gait Assessment/Treatment    Gait, [place] Level stand by assist  -RH contact guard assist  -CW     Gait, Assistive Device --   no AD used  -RH      Gait, Distance (Feet) 300  -  -CW     Gait, Gait Deviations cecilia decreased   intermittent foot drag  -RH cecilia decreased;step length decreased;stride length decreased  -CW     Gait, Safety Issues sequencing ability decreased;step length decreased  -      Gait, Impairments impaired balance  -RH      Recorded  by [RH] Jay Rivers PTA [CW] Colt Paniagua     Functional Mobility    Functional Mobility- Ind. Level conditional independence;supervision required  -RH      Recorded by [RH] Jay Rivers PTA      Balance Skills Training    Sitting-Level of Assistance Independent  -RH      Sitting-Balance Support Feet supported  -RH      Standing-Level of Assistance Independent;Distant supervision  -RH      Static Standing Balance Support assistive device  -RH      Recorded by [RH] Jay Rivers PTA      Positioning and Restraints    Pre-Treatment Position in bed  -RH in bed  -CW     Post Treatment Position bathroom  -RH bed  -CW     In Bed  notified nsg;sitting EOB;call light within reach;encouraged to call for assist  -CW     Bathroom sitting;call light within reach  -RH      Recorded by [RH] Jay Rivers PTA [CW] Colt Paniagua       User Key  (r) = Recorded By, (t) = Taken By, (c) = Cosigned By    Initials Name Effective Dates     Jay Rivers PTA 02/18/16 -     CW Colt Paniagua 12/13/16 -                 IP PT Goals       04/06/17 1338          Bed Mobility PT LTG    Bed Mobility PT LTG, Time to Achieve 1 wk  -CH      Bed Mobility PT LTG, Activity Type all bed mobility  -CH      Bed Mobility PT LTG, [place] Level independent  -CH      Transfer Training PT LTG    Transfer Training PT LTG, Time to Achieve 1 wk  -CH      Transfer Training PT LTG, Activity Type all transfers  -CH      Transfer Training PT LTG, [place] Level independent  -CH      Gait Training PT LTG    Gait Training Goal PT LTG, Time to Achieve 1 wk  -CH      Gait Training Goal PT LTG, [place] Level independent  -CH      Gait Training Goal PT LTG, Distance to Achieve 150  -CH        User Key  (r) = Recorded By, (t) = Taken By, (c) = Cosigned By    Initials Name Provider Type    TREY Gray PT Physical Therapist          Physical Therapy Education     Title: PT OT SLP Therapies (Done)     Topic:  Physical Therapy (Done)     Point: Mobility training (Done)    Learning Progress Summary    Learner Readiness Method Response Comment Documented by Status   Patient Acceptance E,TB DU,VU  CW 04/07/17 1139 Done    Acceptance E,TB,D NR,VU   04/06/17 1338 Done               Point: Body mechanics (Done)    Learning Progress Summary    Learner Readiness Method Response Comment Documented by Status   Patient Acceptance E,TB DU,VU  CW 04/07/17 1139 Done    Acceptance E,TB,D NR,VU   04/06/17 1338 Done               Point: Precautions (Done)    Learning Progress Summary    Learner Readiness Method Response Comment Documented by Status   Patient Acceptance E,TB DU,VU  CW 04/07/17 1139 Done    Acceptance E,TB,D NR,VU   04/06/17 1338 Done                      User Key     Initials Effective Dates Name Provider Type Discipline     12/01/15 -  Keren Gray, PT Physical Therapist PT     12/13/16 -  Colt Paniagua Physical Therapy Assistant PT                    PT Recommendation and Plan  Anticipated Discharge Disposition: home with home health  Planned Therapy Interventions: balance training, bed mobility training, gait training, home exercise program, patient/family education, transfer training  PT Frequency: daily  Plan of Care Review  Plan Of Care Reviewed With: patient  Progress: improving  Outcome Summary/Follow up Plan: Pt doing well and educated on benefits of activity and educated on safety awareness and being attentive to her precautions. Pt states she  is ambulating in her room on her own adn was encouraged to ask for assistance to avoid falls.          Outcome Measures       04/08/17 1000 04/07/17 1100 04/06/17 1300    How much help from another person do you currently need...    Turning from your back to your side while in flat bed without using bedrails? 4  -RH 3  -CW 3  -CH    Moving from lying on back to sitting on the side of a flat bed without bedrails? 4  -RH 3  -CW 3  -CH    Moving to and  from a bed to a chair (including a wheelchair)? 4  -RH 3  -CW 3  -CH    Standing up from a chair using your arms (e.g., wheelchair, bedside chair)? 4  -RH 3  -CW 3  -CH    Climbing 3-5 steps with a railing? 3  -RH 2  -CW 2  -CH    To walk in hospital room? 3  -RH 3  -CW 3  -CH    AM-PAC 6 Clicks Score 22  -RH 17  -CW 17  -CH    Functional Assessment    Outcome Measure Options  AM-PAC 6 Clicks Basic Mobility (PT)  -CW AM-PAC 6 Clicks Basic Mobility (PT)  -CH      User Key  (r) = Recorded By, (t) = Taken By, (c) = Cosigned By    Initials Name Provider Type     Keren Gray, PT Physical Therapist     Jay Rivers PTA Physical Therapy Assistant    CW Colt Paniagua Physical Therapy Assistant           Time Calculation:         PT Charges       04/08/17 1030          Time Calculation    Start Time 1010  -RH      Stop Time 1030  -RH      Time Calculation (min) 20 min  -      PT Received On 04/08/17  -      PT - Next Appointment 04/09/17  -        User Key  (r) = Recorded By, (t) = Taken By, (c) = Cosigned By    Initials Name Provider Type     Jay Rivers PTA Physical Therapy Assistant          Therapy Charges for Today     Code Description Service Date Service Provider Modifiers Qty    22822309254 HC PT THER PROC EA 15 MIN 4/8/2017 Jay Rivers PTA GP 1          PT G-Codes  Outcome Measure Options: AM-PAC 6 Clicks Basic Mobility (PT)    Jay Rivers PTA  4/8/2017

## 2019-06-27 NOTE — DISCHARGE SUMMARY
Discharge Summary PATIENT ID: Reid Baumann MRN: [id number] YOB: 1950 DATE OF ADMISSION: 5/31/2019  7:03 PM   
DATE OF DISCHARGE: 6/26/2019 PRIMARY CARE PROVIDER: Conrado Story MD  
 
ATTENDING PHYSICIAN: Annette Shrestha MD 
 
DISCHARGING PROVIDER: Chapincito Jeffers MD   
To contact this individual call [phone] and ask the  to page. If unavailable ask to be transferred the Adult Hospitalist Department. CONSULTATIONS: IP CONSULT TO CARDIOLOGY 
IP CONSULT TO UROLOGY 
IP CONSULT TO ADVANCED HEART FAILURE 
 
PROCEDURES/SURGERIES: Procedure(s): 
INSERT ICD BIV MULTI 
 
ADMITTING DIAGNOSES & HOSPITAL COURSE:  
59-year-old man with past medical history significant for chronic systolic congestive heart failure, dyslipidemia, hypertension, atrial fibrillation status post cardioversion, was in his usual state of health until the day of presentation at the emergency room when the patient developed worsening of his shortness of breath Acute-on-chronic systolic congestive heart failure NYHA 2 at discharge JACQUE on 05/31 with EF of 21%-25%.      
CTA of the chest is negative for pulmonary embolism.  
Cardiology and AHF team following C/w po aldactone, coreg and BID torsemide Could not tolerate hydralazine/ACE/ARB due to low BP. -As cr slightly trended up -cardiology team wants the patient to be discharged home on milrinone  
  
Atrial fibrillation, status post cardioversion 6/18   
Aflutter - reverted to afib 
- he had cardioversions in the past 
- c/w amiodarone. On  Eliquis  
  
  
LBBB - upgraded to BiV ICD/pacer 6/22.   
  
Hypertension. stable .  
  
Acute kidney injury on CKD   
Creatinine worsened,likely due to low EF while off milrinone - Renal US: 06/05 was normal.  
- cr now stable. -D/c on milrinone. 
  
Hypokalemia- resolved  
  
Anemia-iron deficient - hb stable 
  
UTI:  
Afebrile. Completed Rocephin.  
Ucx: Enterobacter Cloacae 
  
 Urinary retention- resolved, off neal 
  
Insomnia: melatonin. DISCHARGE DIAGNOSES / PLAN:   
 
Acute-on-chronic systolic congestive heart failure Atrial fibrillation, status post cardioversion 6/18   Left bundle branch block.   
 Hypertension .  
 Acute on chronic kidney injury Anemia  
UTI:  
Insomnia. Xr Chest Pa Lat Result Date: 5/31/2019 INDICATION: Shortness of breath FINDINGS: PA and lateral views of the chest demonstrate mild cardiomegaly. Left-sided AICD is present. There is mild pulmonary interstitial edema. No focal consolidation or pleural effusion is evident. The visualized osseous structures are unremarkable. IMPRESSION: Mild cardiomegaly with mild pulmonary interstitial edema. Xr Abd Acute W 1 V Chest 
 
Result Date: 6/4/2019 ACUTE ABDOMINAL SERIES RADIOGRAPHS. 6/4/2019 5:51 PM INDICATION: Fever of unknown origin. COMPARISON: 5/31/2019; CT chest 5/31/2019. TECHNIQUE: AP supine view/s of the abdomen. Upright AP view/s of the chest and abdomen. FINDINGS: Interstitial thickening is similar to the prior examination, when it was better seen on CT to represent interstitial edema. A probable left pleural effusion is associated with passive atelectasis. There may be a trace right pleural effusion, better visible on abdominal radiographs. Post CABG and pacemaker/AICD placement. Surgical clips are scattered over the right axilla and superior chest. The bowel gas pattern is normal. No pneumoperitoneum. Right degenerative disc disease L4-L5. IMPRESSION: 1. Probable interstitial edema and small left pleural effusion. 2. Normal bowel gas pattern. Cta Chest W Or W Wo Cont Result Date: 5/31/2019 EXAM:  CTA CHEST W OR W WO CONT INDICATION:   sob x 3 months/ cardioverted this am by Dr Arnoldo Deleon COMPARISON: None. CONTRAST:  80 mL of Isovue-370.  TECHNIQUE: Precontrast  images were obtained to localize the volume for acquisition. Multislice helical CT arteriography was performed from the diaphragm to the thoracic inlet during uneventful rapid bolus intravenous contrast administration. Lung and soft tissue windows were generated. Coronal and sagittal images were generated and 3D post processing consisting of coronal maximum intensity images was performed. CT dose reduction was achieved through use of a standardized protocol tailored for this examination and automatic exposure control for dose modulation. FINDINGS: LUNGS:  There are Kerley B lines noted along the periphery indicating mild interstitial edema. Oris Rich PLEURA: Small bilateral pleural effusions noted. TRACHEA/BRONCHI: Patent. PULMONARY ARTERIES: The pulmonary arteries are well enhanced and no pulmonary emboli are identified. MEDIASTINUM/LUCERO: There are prominent and also mildly enlarged lymph nodes in the precarinal and subcarinal.. AORTA: The aorta enhances normally without evidence of aneurysm or dissection. UPPER ABDOMEN: The visualized portions of the upper abdominal organs are normal. BONES: No sclerotic or lytic lesion. IMPRESSION: 1. No evidence of pulmonary embolus. 2. Interstitial edema and small bilateral pleural effusions. 3. Mild mediastinal adenopathy. Oris Rich Us Retroperitoneum Comp Result Date: 6/5/2019 Indication: Acute renal failure with urinary retention Realtime sonographic imaging of the retroperitoneum was performed. The right kidney measures 10.6 cm and the left kidney measures 11.4 cm. They are normal in size and appearance without evidence of mass lesion, hydronephrosis, or calcification. The bladder is unremarkable as is the visualized portion of the abdominal aorta and IVC. The common iliac bifurcation is not visualized due to bowel gas. IMPRESSION: Normal renal ultrasound. Xr Chest [facility] Result Date: 6/24/2019 EXAM:  XR CHEST PORT. INDICATION: SOB, CHF. COMPARISON: 6/21/2019. FINDINGS: A portable AP radiograph of the chest was obtained at 0812 hours. There is a pacemaker in the left chest and there are mediastinal clips and clips in the right axilla. Lines and tubes: The patient is on a cardiac monitor. Lungs: The lungs are clear. Pleura: There is no pneumothorax or pleural effusion. Mediastinum: The cardiac and mediastinal contours and pulmonary vascularity are normal. Bones and soft tissues: The bones and soft tissues are grossly within normal limits. IMPRESSION: No acute edema or other acute abnormality. Xr Chest [facility] Result Date: 6/21/2019 INDICATION:  Evaluate for pneumothorax EXAM: Chest single view. COMPARISON: 6/19/2019. FINDINGS: A single frontal view of the chest at 1658 hours shows mild stable interstitial prominence with no new focal consolidation or pleural effusion. There is no pneumothorax. .  The heart, mediastinum and pulmonary vasculature are stable . AICD from the left is stable. .  The bony thorax is unremarkable for age. Hernando Benitez IMPRESSION: No pneumothorax. Stable mild interstitial prominence/interstitial edema pattern. .  . Xr Chest [facility] Result Date: 6/19/2019 EXAM: XR CHEST PORT INDICATION: pulmonary edema COMPARISON: 6/20/2019 FINDINGS: A portable AP radiograph of the chest was obtained at 1401 hours. The patient is on a cardiac monitor. Pacemaker remains in place. Heart size is at the upper limits of normal. Mild pulmonary vascular congestion mild pulmonary edema is present. This is similar to the previous examination. No pneumonia. IMPRESSION: 1. Persistent mild pulmonary edema in the perihilar regions. Xr Chest [facility] Result Date: 6/8/2019 Chest portable AP History: Short of breath Comparison: 6/4/2019 Findings:  A left clavian pacemaker is in place. The patient is status post CABG. Surgical clips are seen in the right axilla. The lungs are well expanded. No focal consolidation or pneumothorax.  Probable small left pleural effusion that is unchanged. The heart is mildly enlarged, but unchanged there is unchanged mild edema. . The visualized osseous structures are unremarkable. Impression: Unchanged mild cardiomegaly and edema. Probable small unchanged left pleural effusion. PENDING TEST RESULTS:  
At the time of discharge the following test results are still pending: none FOLLOW UP APPOINTMENTS:   
Follow-up Information Follow up With Specialties Details Why Contact Info [facility] Cardiology On 7/1/2019 1:00 pm with Ciara Bynum NP  [address] [facility], [address] [facility] [address] 
[phone] [facility]  On 6/27/2019 Skilled nursing services [phone] [address] [place] [address], Infusion Therapy  Home infusion medications and supplies [address] [facility]. [address] [facility] [address] [phone] Heena Pinzon MD Family Practice In 1 week  [address] [facility] 97 
[phone] Yue Dalal MD Cardiology In 5 days  [address] [facility] [address] [address], [place] 
[phone] Eveline Michaels MD Cardiology In 1 week  [address] [facility] [address] [place] [address], [place] 
[phone] ADDITIONAL CARE RECOMMENDATIONS:  
-Follow up with all your doctors. 
-Weigh yourself every day and if increasing by 5 lbs ,call your cardiologist. 
No shower until Friday. No lifting arm higher than shoulder for two weeks. Wound check next week at [address] [place] [address] Blvd office Stop taking bumex/lasix and start taking torsemide. We decreased coreg dose to 6.25 mg twice daily Stop taking  entresto. DIET: Cardiac Diet ACTIVITY: Activity as tolerated WOUND CARE: na 
 
EQUIPMENT needed: na 
 
 
 
 
DISCHARGE MEDICATIONS: 
Discharge Medication List as of 6/26/2019  3:27 PM  
  
START taking these medications  Details  
milrinone (PRIMACOR) 20 mg/100 mL (200 mcg/mL) infusion 18.14 mcg/min by IntraVENous route continuous. , No Print, Disp-1 mL, R-0  
  
  
CONTINUE these medications which have CHANGED Details  
!! amiodarone (CORDARONE) 200 mg tablet Take 1 Tab by mouth two (2) times a day., Print, Disp-40 Tab, R-0  
  
torsemide (DEMADEX) 20 mg tablet Take 2 Tabs by mouth two (2) times a day., Print, Disp-80 Tab, R-0  
  
carvedilol (COREG) 6.25 mg tablet Take 1 Tab by mouth two (2) times daily (with meals). , Print, Disp-40 Tab, R-0  
  
 !! - Potential duplicate medications found. Please discuss with provider. CONTINUE these medications which have NOT CHANGED Details  
   
  
aspirin delayed-release 81 mg tablet Take 81 mg by mouth daily. , Historical Med  
  
apixaban (ELIQUIS) 5 mg tablet Take 5 mg by mouth two (2) times a day., Historical Med  
  
glucosamine/chondr vyas A sod (OSTEO BI-FLEX PO) Take 1 Tab by mouth two (2) times a day., Historical Med  
  
evolocumab (REPATHA SURECLICK) pen injection 283 mg by SubCUTAneous route every fourteen (14) days. , Historical Med  
  
spironolactone (ALDACTONE) 25 mg tablet Take 25 mg by mouth daily. , Historical Med  
  
 !! - Potential duplicate medications found. Please discuss with provider. STOP taking these medications  
  
 furosemide (LASIX) 80 mg tablet Comments:  
Reason for Stopping:   
   
 bumetanide (BUMEX) 2 mg tablet Comments:  
Reason for Stopping:   
   
 sacubitril-valsartan (ENTRESTO) 24 mg/26 mg tablet Comments:  
Reason for Stopping:   
   
  
 
 
 
NOTIFY YOUR PHYSICIAN FOR ANY OF THE FOLLOWING:  
Fever over 101 degrees for 24 hours. Chest pain, shortness of breath, fever, chills, nausea, vomiting, diarrhea, change in mentation, falling, weakness, bleeding. Severe pain or pain not relieved by medications. Or, any other signs or symptoms that you may have questions about. DISPOSITION: 
X  Home With: 
 OT  PT X HH  RN  
  
 Long term SNF/Inpatient Rehab Independent/assisted living Hospice Other: PATIENT CONDITION AT DISCHARGE:  
 
Functional status Poor Deconditioned X Independent Cognition X Lucid Forgetful Dementia Catheters/lines (plus indication) Lizama PICC   
 PEG   
X None Code status X Full code DNR   
 
PHYSICAL EXAMINATION AT DISCHARGE: 
  
General:  Alert, cooperative, no distress, appears stated age. Lungs:     clear eldon bases Heart:  Regular rate and rhythm, S1, S2 normal, no murmur.   
Abdomen:   Soft, non-tender. Bowel sounds normal.   
Extremities: Extremities normal, atraumatic, no cyanosis. No LE edema. Skin: Skin color, texture, turgor normal. No rashes or lesions Neurologic: Grossly normal.   
 
 
 
 
 
CHRONIC MEDICAL DIAGNOSES: 
Problem List as of 6/26/2019 Date Reviewed: 6/10/2019 Codes Class Noted - Resolved * (Principal) Acute on chronic systolic CHF (congestive heart failure) (HCC) ICD-10-CM: R48.11 ICD-9-CM: 428.23, 428.0  5/31/2019 - Present Paroxysmal atrial fibrillation (HCC) ICD-10-CM: I48.0 ICD-9-CM: 427.31  4/2/2019 - Present 40 minutes were spent with the patient on counseling and coordination of care Signed: Justin Spencer MD 
6/27/2019 
6:30 AM

## 2019-06-28 ENCOUNTER — TELEPHONE (OUTPATIENT)
Dept: CARDIOLOGY CLINIC | Age: 69
End: 2019-06-28

## 2019-06-28 LAB
BACTERIA SPEC CULT: ABNORMAL
CC UR VC: ABNORMAL
SERVICE CMNT-IMP: ABNORMAL

## 2019-06-28 NOTE — TELEPHONE ENCOUNTER
I received a call from Bandar from [facility] ([phone]) requesting how often to draw labs on patient. I called her back and left voicemail stating labs should be drawn weekly and to call back with questions.

## 2019-06-28 NOTE — DISCHARGE INSTRUCTIONS
Discharge Instructions       PATIENT ID: Matt Bailey  MRN: [id number]   YOB: 1950    DATE OF ADMISSION: 5/31/2019  7:03 PM    DATE OF DISCHARGE: 6/26/2019    PRIMARY CARE PROVIDER: Hal Barton MD     ATTENDING PHYSICIAN: Darrell Yanes MD  DISCHARGING PROVIDER: Vishal Montoya MD    To contact this individual call [phone] and ask the  to page. If unavailable ask to be transferred the Adult Hospitalist Department. DISCHARGE DIAGNOSES -Acute on chronic systolic congestive heart failure  Atrial fibrillation s/p cardioversion. CONSULTATIONS: IP CONSULT TO CARDIOLOGY  IP CONSULT TO UROLOGY  IP CONSULT TO ADVANCED HEART FAILURE    PROCEDURES/SURGERIES: Procedure(s):  INSERT ICD BIV MULTI    PENDING TEST RESULTS:   At the time of discharge the following test results are still pending: none    Xr Chest Pa Lat    Result Date: 5/31/2019  IMPRESSION: Mild cardiomegaly with mild pulmonary interstitial edema. Xr Abd Acute W 1 V Chest    Result Date: 6/4/2019  IMPRESSION: 1. Probable interstitial edema and small left pleural effusion. 2. Normal bowel gas pattern. Cta Chest W Or W Wo Cont    Result Date: 5/31/2019  IMPRESSION: 1. No evidence of pulmonary embolus. 2. Interstitial edema and small bilateral pleural effusions. 3. Mild mediastinal adenopathy. .      Us Retroperitoneum Comp    Result Date: 6/5/2019  IMPRESSION: Normal renal ultrasound. Xr Chest Port    Result Date: 6/24/2019  IMPRESSION: No acute edema or other acute abnormality. Xr Chest Port    Result Date: 6/21/2019  IMPRESSION: No pneumothorax. Stable mild interstitial prominence/interstitial edema pattern. .  . Xr Chest Port    Result Date: 6/19/2019  IMPRESSION: 1. Persistent mild pulmonary edema in the perihilar regions. Xr Chest Port    Result Date: 6/8/2019  Impression: Unchanged mild cardiomegaly and edema. Probable small unchanged left pleural effusion.          FOLLOW UP APPOINTMENTS: Follow-up Information     Follow up With Specialties Details [facility] Cardiology On 7/1/2019 1:00 pm with Alan Ricci NP  [address] [address]  [phone]    [facility]  On 6/27/2019 Skilled nursing services [address] [facility], Infusion Therapy  Home infusion medications and supplies [address] [facility]. 1200 [facility] [place] 19    [facility], [address] In 1 week  [address] [facility]      Pricila Jackson MD Cardiology In 5 days  [place] [address]      Tammie Russell MD Cardiology In 1 week  [address] [facility]  [address] [facility]  [phone]             ADDITIONAL CARE RECOMMENDATIONS:   -Follow up with all your doctors.  -Weigh yourself every day and if increasing by 5 lbs ,call your cardiologist.  No shower until Friday. No lifting arm higher than shoulder for two weeks. Wound check next week at  office    Stop taking bumex/lasix and start taking torsemide. We decreased coreg dose to 6.25 mg twice daily  Stop taking  entresto. DIET: Cardiac Diet      ACTIVITY: Activity as tolerated    WOUND CARE: na    EQUIPMENT needed: na      DISCHARGE MEDICATIONS:   See Medication Reconciliation Form    · It is important that you take the medication exactly as they are prescribed. · Keep your medication in the bottles provided by the pharmacist and keep a list of the medication names, dosages, and times to be taken in your wallet. · Do not take other medications without consulting your doctor. NOTIFY YOUR PHYSICIAN FOR ANY OF THE FOLLOWING:   Fever over 101 degrees for 24 hours. Chest pain, shortness of breath, fever, chills, nausea, vomiting, diarrhea, change in mentation, falling, weakness, bleeding.  Severe pain or pain not relieved by medications. Or, any other signs or symptoms that you may have questions about.       DISPOSITION:  X  Home With:   OT  PT  HH  RN       SNF/Inpatient Rehab/LTAC    Independent/assisted living    Hospice    Other:         Signed:   Rock Nacho MD  6/26/2019  1:21 PM

## 2019-06-28 NOTE — TELEPHONE ENCOUNTER
----- Message from Mario Allen sent at 6/28/2019 12:51 PM EDT -----  Zulay Jarquin from [address] [place] [address] requesting call back. Requesting call back regarding patient.     Her number is [phone]    I returned call to [facility], they did not draw labs today-will draw labs on monday

## 2019-07-01 ENCOUNTER — OFFICE VISIT (OUTPATIENT)
Dept: CARDIOLOGY CLINIC | Age: 69
End: 2019-07-01

## 2019-07-01 VITALS
DIASTOLIC BLOOD PRESSURE: 60 MMHG | OXYGEN SATURATION: 97 % | SYSTOLIC BLOOD PRESSURE: 100 MMHG | WEIGHT: 208.6 LBS | HEART RATE: 84 BPM | TEMPERATURE: 98.4 F | BODY MASS INDEX: 26.77 KG/M2 | RESPIRATION RATE: 20 BRPM | HEIGHT: 74 IN

## 2019-07-01 DIAGNOSIS — R35.1 NOCTURIA: ICD-10-CM

## 2019-07-01 DIAGNOSIS — G47.33 OBSTRUCTIVE SLEEP APNEA SYNDROME: ICD-10-CM

## 2019-07-01 DIAGNOSIS — I51.3 THROMBUS OF LEFT ATRIAL APPENDAGE: ICD-10-CM

## 2019-07-01 DIAGNOSIS — I50.9 STAGE C CHRONIC COMBINED CONGESTIVE HEART FAILURE (HCC): ICD-10-CM

## 2019-07-01 DIAGNOSIS — I48.0 AF (PAROXYSMAL ATRIAL FIBRILLATION) (HCC): Primary | ICD-10-CM

## 2019-07-01 RX ORDER — SPIRONOLACTONE 25 MG/1
TABLET ORAL
COMMUNITY
Start: 2017-09-26 | End: 2019-07-01 | Stop reason: SDUPTHER

## 2019-07-01 NOTE — PATIENT INSTRUCTIONS
CONTINUE CURRENT medications- may change pending labs     Take twice a day medications 12 hours apart with food    Labs today by home health- will call with results    Limit sodium - less than 2000mg, limit fluid intake, approximately 6 x 8 oz / 24hr     Continue being active and working with PT/OT    Will facilitate sleep study and urology consult    Keep a positive attitude     Follow up 1-2 weeks with [facility]    HF Education: Continue daily weights (in the morning, after voiding). Notify HF team of overnight weight gains > 2 lbs or weekly >5 lbs or if any of the following Sx. Continue to limit sodium intake & monitor your fluid intake .

## 2019-07-01 NOTE — PROGRESS NOTES
[facility] Clinic Note      DOS:  7/1/2019  REFERRING PROVIDER:  Latonia Doe MD  PRIMARY CARE PHYSICIAN: Qiana Smith MD  PRIMARY CARDIOLOGIST: Latonia Doe MD          IMPRESSION/PLAN    Chief Complaint   Patient presents with   [address] Follow Up    Leg Swelling         HPI: Ivy Infante is a 76y.o. year old pleasant white male with a history of HTN, HL, likey JOSE, CAD s/p cardiac arrest VFib s/p CABG (2011) c/b sternal would infection and sternectomy, ischemic cardiomyopathy LVEF 15-20%, s/p ICD and with LBBB. Patient admitted with acute on chronic systolic heart failure with massive volume overload > 20 lbs, in the setting of atrial fibrillation s/p failed DCCV and new UTI now treated with IV antibiotics. Hx of chronic UTI. He underwent DCCV and RHC on 6/18. S/p BiVICD on 6/21/19 with Dr. Lissette Guillermo. His IV milrinone and IV bumex were discontinued and his renal functioning worsened. The milrinone gtt was resumed and he was discharged home home on IV milrinone on 6/26/19. Today he presents for follow up for his systolic heart failure and hospitalization. He presents to clinic today accompanied by his wife. Denies SOB or DOUGLAS, has been walking 2mins in the house per home PT. Unable to walk a full city block without SOB/DOUGLAS but report improvement of symptoms from discharge with milrinone gtt. Denies dizziness, lightheadedness, presyncope, syncope, or PND Sleeps on 2 pillows, + nocturia with hesitancy. Appetite improved, working on eating low Na+ foods. He has gained 8 lbs since hospital discharge according to our scale but his weight remains stable according to his home scale at 200-201lbs. Mr. Monty Savage reports issues with his infusion pump for his milrinone gtt. He had to replace the batteries on both pumps 3 times over the weekend. Saturday he had increased dyspnea. His daughter realized the pump was beeping and had been off for over 24hrs.   The pt thought the constant beeping from the pump was normal and informing him the milrinone was infusing. He called the home health company for assistance. No calls were made to [facility] of this situation. Since the milrinone was restarted Mr. Vargas Lutz dyspnea has resolved. He has been informed to notify the [facility] if he has and issues again. Plan:   Continue torsemide 40 mg BID (holds fluid in his abdomen) will adjust after labs  Continue spironolactone 25mg daily  Continue Coreg 6.25 mg po BID   Continue amiodarone 200mg po BID for rate control  Continue apixaban for AC   Intolerant to ACE/ARB/ARNi due to renal dysfunction  Home Health to get labs today: CBC, CMP, NTproBNP, Mag  Schedule ECHO in 3 months  Low Na+ diet  Daily weights  Obtain home scale and BP- document and trend, bring to next clinic visit  Fluid Restrict 6 x 8oz in 24 hours   Sleep study referral- suspected JOSE    Urology referral- chronic UTI,  + nocturia with hesitancy  Follow up with primary Cardiologist, Dr. Azar Peer  Follow up with EP, Dr. Sophia Davis interrogation    Follow up with PCP, Grady Dillon MD   Follow up with [facility] in 1-2 weeks with NP/MD    IMPRESSION:  Fatigue  Shortness of breath  Volume overload  Pulmonary edema  Acute on chronic systolic heart failure  Stage C, NYHA class IVsymptoms  Coronary artery disease  S/p arrest VFib and subsequent CABG 2010  Sternal wound infection requiring sternectomy  Ischemic cardiomyopathy, LVEF 20% 10/18  Atrial fibrillation s/p failed DCCV 6/10/19, s/p successful DCCV 6/18/19   Chronic anticoagulation with eliquis  S/p BiVICD 6/21/19  Acute on CKD, stage 3  UTI  Urinary retension  Anemia, iron deficiency  H/o DVT  Migraines  JACQUE on 2/4/19 showed thrombus  H/o tobacco abuse  Nighttime desaturations, likely JOSE  Body mass index is 26.78 kg/m².           CARDIAC EVALUATION  ECHO (5/31/19) LVEF 21-25%, severely dilated LA, moderately dilated RA  ECHO (6/24/19) LVEF 16-20%, Severely dilated LV, trace MR, trace TR    EKG (6/12/19) atrial flutter with occasional PVCs, LBBB QRS 158ms  EKG (6/26/19) Atrial fibrillation with premature ventricular or aberrantly conducted complexes, Left bundle branch block, rate 86, , QTc 564     [facility] not in epic    Review of Systems:     General:  Denies fever, fatigue   HEENT: Denies angioedema, epistaxis  Pulmonary: Denies cough, wheeze, hemoptysis   Cardiac: Denies exertional chest pain, palpitations, syncope, near syncope, orthopnea, PND, bleeding, claudication, AICD firing   GI:  Denies  abdominal pain, change in bowel habits, or black or bloody stools  Musculo: Denies myalgias, arthralgias  Neuro: Denies headaches, CVA/TIA sx  Skin:  Denies rash  Heme: Denies bruising, bleeding, lymphadenopathy  Psych: Denies anxiety or depression    Physical Exam:     Visit Vitals  /60 (BP 1 Location: Left arm, BP Patient Position: Sitting)   Pulse 84   Temp 98.4 °F (36.9 °C) (Oral)   Resp 20   Ht 6' 2\" (1.88 m)   Wt 208 lb 9.6 oz (94.6 kg)   SpO2 97%   BMI 26.78 kg/m²         General:  AAOx3 cooperative, no acute distress. HEENT:  Atraumatic. Pink and moist.  Anicteric sclerae. Neck:   Supple, no adenopoathy. Lungs:  Clear but diminished posterior, No wheezing/rhonchi/rales. Heart:   Median sternotomy scar, sternectomy, AICD in left upper chest wall:  Irregular rhythm, S1, S2 present, no murmur, no rubs, no gallops. JVD 10 cm (-) HJR . No carotid bruits. Abdomen:  Soft, non-distended, non-tender. + Bowel sounds. No bruits. Extremities:  Trace BLE edema, no clubbing, no cyanosis. No calf tenderness  Neurologic:  Grossly intact. Alert and oriented X 3. No acute neurological distress. Skin:   intact, no wounds, scattered ecchymosis and bruising, no rashes   Psych:  Good insight. Not anxious nor agitated.       History:  Past Medical History:   Diagnosis Date    Degenerative disc disease, lumbar     Heart failure (HCC)     High cholesterol     Hypertension     Paroxysmal atrial fibrillation ([facility] [address] 2019    Spinal stenosis      Past Surgical History:   Procedure Laterality Date    HX APPENDECTOMY      HX CORONARY ARTERY BYPASS GRAFT      triple    HX HERNIA REPAIR      HX IMPLANTABLE CARDIOVERTER DEFIBRILLATOR      [place] CARDIOVERSION ELECTIVE ARRHYTHMIA EXTERNAL N/A 6/10/2019    EP CARDIOVERSION performed by Oscar Parmar MD at [address]  CATH [facility]    [place] CARDIOVERSION ELECTIVE ARRHYTHMIA EXTERNAL N/A 2019    EP CARDIOVERSION performed by Kristan Colbert MD at [address] CATH [facility]    [place] INSJ/RPLCMT PERM DFB W/TRNSVNS LDS 1/DUAL CHMBR N/A 2019    INSERT ICD BIV MULTI performed by Enrique Ramirez MD at [address]  CATH [facility]     Social History     Socioeconomic History    Marital status:      Spouse name: Not on file    Number of children: Not on file    Years of education: Not on file    Highest education level: Not on file   Occupational History    Not on file   Social Needs    Financial resource strain: Not on file    Food insecurity:     Worry: Not on file     Inability: Not on file    Transportation needs:     Medical: Not on file     Non-medical: Not on file   Tobacco Use    Smoking status: Former Smoker     Last attempt to quit: 2010     Years since quittin.5    Smokeless tobacco: Never Used   Substance and Sexual Activity    Alcohol use: Yes     Comment: rarely    Drug use: Never    Sexual activity: Not on file   Lifestyle    Physical activity:     Days per week: Not on file     Minutes per session: Not on file    Stress: Not on file   Relationships    Social connections:     Talks on phone: Not on file     Gets together: Not on file     Attends Orthodoxy service: Not on file     Active member of club or organization: Not on file     Attends meetings of clubs or organizations: Not on file     Relationship status: Not on file    Intimate partner violence:     Fear of current or ex partner: Not on file Emotionally abused: Not on file     Physically abused: Not on file     Forced sexual activity: Not on file   Other Topics Concern    Not on file   Social History Narrative    Not on file     No family history on file. Current Medications:   Current Outpatient Medications   Medication Sig Dispense Refill    amiodarone (CORDARONE) 200 mg tablet Take 1 Tab by mouth two (2) times a day. 40 Tab 0    milrinone (PRIMACOR) 20 mg/100 mL (200 mcg/mL) infusion 18.14 mcg/min by IntraVENous route continuous. 1 mL 0    torsemide (DEMADEX) 20 mg tablet Take 2 Tabs by mouth two (2) times a day. 80 Tab 0    carvedilol (COREG) 6.25 mg tablet Take 1 Tab by mouth two (2) times daily (with meals). 40 Tab 0    aspirin delayed-release 81 mg tablet Take 81 mg by mouth daily.  apixaban (ELIQUIS) 5 mg tablet Take 5 mg by mouth two (2) times a day.  glucosamine/chondr vyas A sod (OSTEO BI-FLEX PO) Take 1 Tab by mouth two (2) times a day.  evolocumab (REPATHA SURECLICK) pen injection 325 mg by SubCUTAneous route every fourteen (14) days.  spironolactone (ALDACTONE) 25 mg tablet Take 25 mg by mouth daily. Allergies: No Known Allergies        Recent Labs:   Labs Latest Ref Rng & Units 6/26/2019 6/25/2019 6/24/2019 6/23/2019 6/22/2019 6/22/2019 6/21/2019   WBC 4.1 - 11.1 K/uL 6.7 6.5 7.1 7.9 - - 6.3   RBC 4.10 - 5.70 M/uL 3.80(L) 4.01(L) 4.22 4.16 - - 4.07(L)   Hemoglobin 12.1 - 17.0 g/dL 11. 4(L) 12.2 12.5 12.7 - - 12.3   Hematocrit 36.6 - 50.3 % 34. 7(L) 36.7 38.3 37.7 - - 37.3   MCV 80.0 - 99.0 FL 91.3 91.5 90.8 90.6 - - 91.6   Platelets 370 - 725 K/uL 175 184 192 207 - - 261   Lymphocytes 12 - 49 % - 18 - 14 - - -   Monocytes 5 - 13 % - 12 - 13 - - -   Eosinophils 0 - 7 % - 1 - 1 - - -   Basophils 0 - 1 % - 1 - 1 - - -   Bands 0 - 6 % - - - - - - -   Albumin 3.5 - 5.0 g/dL 3. 0(L) 3. 2(L) 3. 1(L) - 3. 3(L) - -   Calcium 8.5 - 10.1 MG/DL 8.8 9.2 9.2 9.6 9.3 9.3 9.4   SGOT 15 - 37 U/L 12(L) 18 12(L) - 25 - - Glucose 65 - 100 mg/dL 111(H) 100 107(H) 209(H) 88 95 106(H)   BUN 6 - 20 MG/DL 43(H) 44(H) 39(H) 36(H) 31(H) 31(H) 32(H)   Creatinine 0.70 - 1.30 MG/DL 2.31(H) 2.32(H) 2.23(H) 2.37(H) 2.02(H) 2.05(H) 2.14(H)   Sodium 136 - 145 mmol/L 134(L) 132(L) 132(L) 131(L) 133(L) 132(L) 132(L)   Potassium 3.5 - 5.1 mmol/L 3.6 3.7 3.6 3.6 3.7 3.6 3.7   TSH 0.36 - 3.74 uIU/mL - - - - - - -   Some recent data might be hidden     Lab Results   Component Value Date/Time    WBC 6.7 06/26/2019 03:00 AM    HGB 11.4 (L) 06/26/2019 03:00 AM    HCT 34.7 (L) 06/26/2019 03:00 AM    PLATELET 465 88/97/1303 03:00 AM    MCV 91.3 06/26/2019 03:00 AM     Lab Results   Component Value Date/Time    GFR est non-AA 28 (L) 06/26/2019 03:00 AM    GFR est AA 34 (L) 06/26/2019 03:00 AM    Creatinine 2.31 (H) 06/26/2019 03:00 AM    BUN 43 (H) 06/26/2019 03:00 AM    Sodium 134 (L) 06/26/2019 03:00 AM    Potassium 3.6 06/26/2019 03:00 AM    Chloride 95 (L) 06/26/2019 03:00 AM    CO2 31 06/26/2019 03:00 AM    Magnesium 2.4 06/26/2019 03:00 AM    Phosphorus 2.4 (L) 06/04/2019 04:09 AM     Lab Results   Component Value Date/Time    TSH 2.45 06/01/2019 04:16 AM      Lab Results   Component Value Date/Time    Sodium 134 (L) 06/26/2019 03:00 AM    Potassium 3.6 06/26/2019 03:00 AM    Chloride 95 (L) 06/26/2019 03:00 AM    CO2 31 06/26/2019 03:00 AM    Anion gap 8 06/26/2019 03:00 AM    Glucose 111 (H) 06/26/2019 03:00 AM    BUN 43 (H) 06/26/2019 03:00 AM    Creatinine 2.31 (H) 06/26/2019 03:00 AM    BUN/Creatinine ratio 19 06/26/2019 03:00 AM    GFR est AA 34 (L) 06/26/2019 03:00 AM    GFR est non-AA 28 (L) 06/26/2019 03:00 AM    Calcium 8.8 06/26/2019 03:00 AM    Bilirubin, total 0.5 06/26/2019 03:00 AM    ALT (SGPT) 14 06/26/2019 03:00 AM    AST (SGOT) 12 (L) 06/26/2019 03:00 AM    Alk.  phosphatase 86 06/26/2019 03:00 AM    Protein, total 6.7 06/26/2019 03:00 AM    Albumin 3.0 (L) 06/26/2019 03:00 AM    Globulin 3.7 06/26/2019 03:00 AM    A-G Ratio 0.8 (L) 06/26/2019 03:00 AM      No results found for: HBA1C, LHA2KIIQ, HGBE8, JHU3MUKZ, ART1ERKP, NNE7IVCT     Thank you for allowing me to participate in his care.      Jh Rueda [address] [place] [address], [facility] [address] [address]  [phone]  24 hour VAD/HF Pager: [phone]

## 2019-07-02 ENCOUNTER — TELEPHONE (OUTPATIENT)
Dept: CARDIOLOGY CLINIC | Age: 69
End: 2019-07-02

## 2019-07-02 NOTE — TELEPHONE ENCOUNTER
----- Message from Edouard Arthur sent at 7/1/2019  4:26 PM EDT -----  Voicemail left from jerome w/ [address] [facility] (not sure if name) [facility]. She wanted to know if labs can be drawn today while he was here. He checked out before I checked messages.  [phone]    I returned call to [facility] her that labs need to be drawn by [place] through PICC line-she states they were drawn this am STAT

## 2019-07-03 ENCOUNTER — TELEPHONE (OUTPATIENT)
Dept: CARDIOLOGY CLINIC | Age: 69
End: 2019-07-03

## 2019-07-03 RX ORDER — TORSEMIDE 20 MG/1
60 TABLET ORAL 2 TIMES DAILY
Qty: 80 TAB | Refills: 0 | Status: SHIPPED | OUTPATIENT
Start: 2019-07-03 | End: 2019-07-10 | Stop reason: SDUPTHER

## 2019-07-03 NOTE — TELEPHONE ENCOUNTER
Discussed with pt recent lab results creatinine 2.46 and NTproBNP 5088 as well as increasing the torsemide dose to 60mg twice daily.

## 2019-07-07 LAB
ATRIAL RATE: 54 BPM
CALCULATED R AXIS, ECG10: 10 DEGREES
CALCULATED T AXIS, ECG11: 139 DEGREES
DIAGNOSIS, 93000: NORMAL
Q-T INTERVAL, ECG07: 430 MS
QRS DURATION, ECG06: 160 MS
QTC CALCULATION (BEZET), ECG08: 526 MS
VENTRICULAR RATE, ECG03: 90 BPM

## 2019-07-10 RX ORDER — TORSEMIDE 20 MG/1
60 TABLET ORAL 2 TIMES DAILY
Qty: 540 TAB | Refills: 1 | Status: SHIPPED | OUTPATIENT
Start: 2019-07-10 | End: 2019-07-31

## 2019-07-12 ENCOUNTER — OFFICE VISIT (OUTPATIENT)
Dept: CARDIOLOGY CLINIC | Age: 69
End: 2019-07-12

## 2019-07-12 VITALS
TEMPERATURE: 97.5 F | OXYGEN SATURATION: 98 % | SYSTOLIC BLOOD PRESSURE: 124 MMHG | DIASTOLIC BLOOD PRESSURE: 84 MMHG | RESPIRATION RATE: 20 BRPM | WEIGHT: 203.4 LBS | HEIGHT: 74 IN | BODY MASS INDEX: 26.1 KG/M2 | HEART RATE: 74 BPM

## 2019-07-12 DIAGNOSIS — I50.9 STAGE C CHRONIC COMBINED CONGESTIVE HEART FAILURE (HCC): Primary | ICD-10-CM

## 2019-07-12 DIAGNOSIS — I49.01 VENTRICULAR FIBRILLATION (HCC): ICD-10-CM

## 2019-07-12 RX ORDER — LEVOFLOXACIN 500 MG/1
TABLET, FILM COATED ORAL
COMMUNITY
Start: 2019-07-11 | End: 2019-01-01

## 2019-07-12 NOTE — LETTER
7/12/2019 1:27 PM 
 
Patient:  Naomi Thayer YOB: 1950 Date of Visit: 7/12/2019 Dear Nataliya Oates MD 
[address] [place] [address] VIA Facsimile: [phone] Anna Ramirez MD 
[address] [facility] [address] [facility] [address] [id number] VIA In Basket 
 : Thank you for referring Mr. Sona Kiser to me for evaluation/treatment. Below are the relevant portions of my assessment and plan of care. If you have questions, please do not hesitate to call me. I look forward to following Mr. Judd Thomas along with you. Sincerely, Ron Valdez MD

## 2019-07-12 NOTE — PROGRESS NOTES
[facility] Clinic Note      DOS:  7/12/2019  REFERRING PROVIDER:  Scarlet Lugo MD  PRIMARY CARE PHYSICIAN: Fritz Verma MD  PRIMARY CARDIOLOGIST: Scarlet Lugo MD          IMPRESSION/PLAN    Chief Complaint   Patient presents with    CHF         HPI: Jenae Stokes is a 76y.o. year old pleasant white male with a history of HTN, HL, likey JOSE, CAD s/p cardiac arrest VFib s/p CABG (2011) c/b sternal would infection and sternectomy, ischemic cardiomyopathy LVEF 15-20%, s/p ICD and with LBBB. Patient admitted with acute on chronic systolic heart failure with massive volume overload > 20 lbs, in the setting of atrial fibrillation s/p failed DCCV and new UTI now treated with IV antibiotics. Hx of chronic UTI. He underwent DCCV and RHC on 6/18. S/p BiVICD on 6/21/19 with Dr. Galeano. His IV milrinone and IV bumex were discontinued and his renal functioning worsened. The milrinone gtt was resumed and he was discharged home home on IV milrinone on 6/26/19. Today he presents for follow up for his systolic heart failure and is accompanied by his wife. He was recently diagnosed with UTI and started on levaquin. Recent labs revealed an elevation in his serum creatinine to 2.64. Mr. Petros Matthew noted that his urinary hesitancy has improved after starting levaquin. Denies SOB or DOUGLAS, has been walking 2mins in the house per home PT. He reports improvement of symptoms from discharge with milrinone gtt. Denies dizziness, lightheadedness, presyncope, syncope, or PND Sleeps on 2 pillows, + nocturia with hesitancy. Appetite improved, working on eating low Na+ foods. He has gained 8 lbs since hospital discharge according to our scale but his weight remains stable according to his home scale at 200-201lbs. Mr. Petros Matthew has not heard from the nephrology office to schedule a clinic outpatient appointment.        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 Jose Ramon Vines  Follow up with EP, Dr. Joshua Doll interrogation    Follow up with PCP, Mitchell Bonilla MD   Follow up with [facility] in 2 weeks with NP/MD    IMPRESSION:  Fatigue  Shortness of breath  Volume overload  Pulmonary edema  Acute on chronic systolic heart failure  Stage C, NYHA class IV symptoms  Coronary artery disease  S/p arrest VFib and subsequent CABG 2010  Sternal wound infection requiring sternectomy  Ischemic cardiomyopathy, LVEF 20% 10/18  Atrial fibrillation/flutter s/p failed DCCV 6/10/19, s/p successful DCCV 6/18/19   EKG today - QTc 471 ms   Chronic anticoagulation with eliquis  S/p BiVICD 6/21/19  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           CARDIAC EVALUATION  ECHO (5/31/19) LVEF 21-25%, severely dilated LA, moderately dilated RA  ECHO (6/24/19) LVEF 16-20%, Severely dilated LV, trace MR, trace TR    EKG (6/12/19) atrial flutter with occasional PVCs, LBBB QRS 158ms  EKG (6/26/19) Atrial fibrillation with premature ventricular or aberrantly conducted complexes, Left bundle branch block, rate 86, , QTc 564     [facility] not in epic    Review of Systems:     General:  Denies fever, fatigue   HEENT: Denies angioedema, epistaxis  Pulmonary: Denies cough, wheeze, hemoptysis   Cardiac: Denies exertional chest pain, palpitations, syncope, near syncope, orthopnea, PND, bleeding, claudication, AICD firing   GI:  Denies  abdominal pain, change in bowel habits, or black or bloody stools  Musculo: Denies myalgias, arthralgias  Neuro: Denies headaches, CVA/TIA sx  Skin:  Denies rash  Heme: Denies bruising, bleeding, lymphadenopathy  Psych: Denies anxiety or depression    Physical Exam:     Visit Vitals  /84 (BP 1 Location: Left arm, BP Patient Position: Sitting)   Pulse 74   Temp 97.5 °F (36.4 °C) (Oral)   Resp 20   Ht 6' 2\" (1.88 m)   Wt 203 lb 6.4 oz (92.3 kg)   SpO2 98%   BMI 26.12 kg/m²         General:  AAOx3 cooperative, no acute distress. HEENT:  Atraumatic. Pink and moist.  Anicteric sclerae. Neck:   Supple, no adenopoathy. Lungs:  Clear but diminished posterior, No wheezing/rhonchi/rales. Heart:   Median sternotomy scar, sternectomy, AICD in left upper chest wall:  Irregular rhythm, S1, S2 present, no murmur, no rubs, no gallops. JVD 10 cm (-) HJR . No carotid bruits. Abdomen:  Soft, non-distended, non-tender. + Bowel sounds. No bruits. Extremities:  Trace BLE edema, no clubbing, no cyanosis. No calf tenderness  Neurologic:  Grossly intact. Alert and oriented X 3. No acute neurological distress. Skin:   intact, no wounds, scattered ecchymosis and bruising, no rashes   Psych:  Good insight. Not anxious nor agitated.       History:  Past Medical History:   Diagnosis Date    Degenerative disc disease, lumbar     Heart failure (HCC)     High cholesterol     Hypertension     Paroxysmal atrial fibrillation ([facility] [address] 4/2/2019    Spinal stenosis      Past Surgical History:   Procedure Laterality Date    HX APPENDECTOMY      HX CORONARY ARTERY BYPASS GRAFT      triple    HX HERNIA REPAIR      HX IMPLANTABLE CARDIOVERTER DEFIBRILLATOR      DE CARDIOVERSION ELECTIVE ARRHYTHMIA EXTERNAL N/A 6/10/2019    EP CARDIOVERSION performed by Hari Milian MD at [address], [facility]    [address] [place] [address] ARRHYTHMIA EXTERNAL N/A 2019    EP CARDIOVERSION performed by Catalina Weaver MD at [address] [facility] [address] [facility]    CT INSJ/RPLCMT PERM DFB W/TRNSVNS LDS 1/DUAL CHMBR N/A 2019    INSERT ICD BIV MULTI performed by Cinthia Green MD at [address] [facility] [address] [facility]     Social History     Socioeconomic History    Marital status:      Spouse name: Not on file    Number of children: Not on file    Years of education: Not on file    Highest education level: Not on file   Occupational History    Not on file   Social Needs    Financial resource strain: Not on file    Food insecurity:     Worry: Not on file     Inability: Not on file    Transportation needs:     Medical: Not on file     Non-medical: Not on file   Tobacco Use    Smoking status: Former Smoker     Last attempt to quit: 2010     Years since quittin.6    Smokeless tobacco: Never Used   Substance and Sexual Activity    Alcohol use: Yes     Comment: rarely    Drug use: Never    Sexual activity: Not on file   Lifestyle    Physical activity:     Days per week: Not on file     Minutes per session: Not on file    Stress: Not on file   Relationships    Social connections:     Talks on phone: Not on file     Gets together: Not on file     Attends Mosque service: Not on file     Active member of club or organization: Not on file     Attends meetings of clubs or organizations: Not on file     Relationship status: Not on file    Intimate partner violence:     Fear of current or ex partner: Not on file     Emotionally abused: Not on file     Physically abused: Not on file     Forced sexual activity: Not on file   Other Topics Concern    Not on file   Social History Narrative    Not on file     History reviewed. No pertinent family history.     Current Medications:   Current Outpatient Medications   Medication Sig Dispense Refill    levoFLOXacin (LEVAQUIN) 500 mg tablet       torsemide (DEMADEX) 20 mg tablet Take 3 Tabs by mouth two (2) times a day. 540 Tab 1    amiodarone (CORDARONE) 200 mg tablet Take 1 Tab by mouth two (2) times a day. 40 Tab 0    milrinone (PRIMACOR) 20 mg/100 mL (200 mcg/mL) infusion 18.14 mcg/min by IntraVENous route continuous. 1 mL 0    carvedilol (COREG) 6.25 mg tablet Take 1 Tab by mouth two (2) times daily (with meals). 40 Tab 0    aspirin delayed-release 81 mg tablet Take 81 mg by mouth daily.  apixaban (ELIQUIS) 5 mg tablet Take 5 mg by mouth two (2) times a day.  glucosamine/chondr vyas A sod (OSTEO BI-FLEX PO) Take 1 Tab by mouth two (2) times a day.  evolocumab (REPATHA SURECLICK) pen injection 473 mg by SubCUTAneous route every fourteen (14) days.  spironolactone (ALDACTONE) 25 mg tablet Take 25 mg by mouth daily. Allergies: No Known Allergies        Recent Labs:   Labs Latest Ref Rng & Units 6/26/2019 6/25/2019 6/24/2019 6/23/2019 6/22/2019 6/22/2019 6/21/2019   WBC 4.1 - 11.1 K/uL 6.7 6.5 7.1 7.9 - - 6.3   RBC 4.10 - 5.70 M/uL 3.80(L) 4.01(L) 4.22 4.16 - - 4.07(L)   Hemoglobin 12.1 - 17.0 g/dL 11. 4(L) 12.2 12.5 12.7 - - 12.3   Hematocrit 36.6 - 50.3 % 34. 7(L) 36.7 38.3 37.7 - - 37.3   MCV 80.0 - 99.0 FL 91.3 91.5 90.8 90.6 - - 91.6   Platelets 411 - 226 K/uL 175 184 192 207 - - 261   Lymphocytes 12 - 49 % - 18 - 14 - - -   Monocytes 5 - 13 % - 12 - 13 - - -   Eosinophils 0 - 7 % - 1 - 1 - - -   Basophils 0 - 1 % - 1 - 1 - - -   Bands 0 - 6 % - - - - - - -   Albumin 3.5 - 5.0 g/dL 3. 0(L) 3. 2(L) 3. 1(L) - 3. 3(L) - -   Calcium 8.5 - 10.1 MG/DL 8.8 9.2 9.2 9.6 9.3 9.3 9.4   SGOT 15 - 37 U/L 12(L) 18 12(L) - 25 - -   Glucose 65 - 100 mg/dL 111(H) 100 107(H) 209(H) 88 95 106(H)   BUN 6 - 20 MG/DL 43(H) 44(H) 39(H) 36(H) 31(H) 31(H) 32(H)   Creatinine 0.70 - 1.30 MG/DL 2.31(H) 2.32(H) 2.23(H) 2.37(H) 2.02(H) 2.05(H) 2.14(H)   Sodium 136 - 145 mmol/L 134(L) 132(L) 132(L) 131(L) 133(L) 132(L) 132(L)   Potassium 3.5 - 5.1 mmol/L 3.6 3.7 3.6 3.6 3.7 3.6 3.7   TSH 0.36 - 3.74 uIU/mL - - - - - - -   Some recent data might be hidden     Lab Results   Component Value Date/Time    WBC 6.7 06/26/2019 03:00 AM    HGB 11.4 (L) 06/26/2019 03:00 AM    HCT 34.7 (L) 06/26/2019 03:00 AM    PLATELET 279 15/40/4604 03:00 AM    MCV 91.3 06/26/2019 03:00 AM     Lab Results   Component Value Date/Time    GFR est non-AA 28 (L) 06/26/2019 03:00 AM    GFR est AA 34 (L) 06/26/2019 03:00 AM    Creatinine 2.31 (H) 06/26/2019 03:00 AM    BUN 43 (H) 06/26/2019 03:00 AM    Sodium 134 (L) 06/26/2019 03:00 AM    Potassium 3.6 06/26/2019 03:00 AM    Chloride 95 (L) 06/26/2019 03:00 AM    CO2 31 06/26/2019 03:00 AM    Magnesium 2.4 06/26/2019 03:00 AM    Phosphorus 2.4 (L) 06/04/2019 04:09 AM     Lab Results   Component Value Date/Time    TSH 2.45 06/01/2019 04:16 AM      Lab Results   Component Value Date/Time    Sodium 134 (L) 06/26/2019 03:00 AM    Potassium 3.6 06/26/2019 03:00 AM    Chloride 95 (L) 06/26/2019 03:00 AM    CO2 31 06/26/2019 03:00 AM    Anion gap 8 06/26/2019 03:00 AM    Glucose 111 (H) 06/26/2019 03:00 AM    BUN 43 (H) 06/26/2019 03:00 AM    Creatinine 2.31 (H) 06/26/2019 03:00 AM    BUN/Creatinine ratio 19 06/26/2019 03:00 AM    GFR est AA 34 (L) 06/26/2019 03:00 AM    GFR est non-AA 28 (L) 06/26/2019 03:00 AM    Calcium 8.8 06/26/2019 03:00 AM    Bilirubin, total 0.5 06/26/2019 03:00 AM    ALT (SGPT) 14 06/26/2019 03:00 AM    AST (SGOT) 12 (L) 06/26/2019 03:00 AM    Alk. phosphatase 86 06/26/2019 03:00 AM    Protein, total 6.7 06/26/2019 03:00 AM    Albumin 3.0 (L) 06/26/2019 03:00 AM    Globulin 3.7 06/26/2019 03:00 AM    A-G Ratio 0.8 (L) 06/26/2019 03:00 AM      No results found for: HBA1C, NKK2QNJS, HGBE8, ESA1CRSJ, TZA6GNKP, USL7WZKK       Thank you for letting us see him with you,      Mounika Garza MD, [facility] [place]  Chief of Cardiology, [address] Director  [address] [place] [address]  [facility], [address], [address] [facility] [phone]  Fax [phone]

## 2019-07-12 NOTE — PATIENT INSTRUCTIONS
1. Continue current medications  2. Blood work today  3. Follow up with Dr. Radha Lucio  4.  Follow up with the Heart Failure clinic in 2 weeks

## 2019-07-13 LAB
BUN SERPL-MCNC: 40 MG/DL (ref 8–27)
BUN/CREAT SERPL: 16 (ref 10–24)
CALCIUM SERPL-MCNC: 9.4 MG/DL (ref 8.6–10.2)
CHLORIDE SERPL-SCNC: 93 MMOL/L (ref 96–106)
CO2 SERPL-SCNC: 25 MMOL/L (ref 20–29)
CREAT SERPL-MCNC: 2.47 MG/DL (ref 0.76–1.27)
GLUCOSE SERPL-MCNC: 89 MG/DL (ref 65–99)
INR PPP: 1.2 (ref 0.8–1.2)
NT-PROBNP SERPL-MCNC: 4067 PG/ML (ref 0–376)
POTASSIUM SERPL-SCNC: 4.3 MMOL/L (ref 3.5–5.2)
PROTHROMBIN TIME: 12.7 SEC (ref 9.1–12)
SODIUM SERPL-SCNC: 137 MMOL/L (ref 134–144)

## 2019-07-15 ENCOUNTER — TELEPHONE (OUTPATIENT)
Dept: CARDIOLOGY CLINIC | Age: 69
End: 2019-07-15

## 2019-07-15 NOTE — TELEPHONE ENCOUNTER
----- Message from Alea Chambers sent at 7/12/2019  3:56 PM EDT -----  Sheng Funez called from [facility] called. Needs records, labs and recent notes faxed to [phone]. Before his appt with them next week.     Their phone number is [phone]    [facility]

## 2019-07-15 NOTE — TELEPHONE ENCOUNTER
I spoke with patient regarding two appointments we have scheduled for him. He is to see:   Dr. Jayshree Stevens (Urology)  7-   9:50am  [phone]    Dr. Nitin Gage   9-3-2019   3:20pm  [address] [facility] [address]    Patient states understanding.

## 2019-07-16 ENCOUNTER — TELEPHONE (OUTPATIENT)
Dept: CARDIOLOGY CLINIC | Age: 69
End: 2019-07-16

## 2019-07-16 NOTE — TELEPHONE ENCOUNTER
Cornelio Horne from [facility] called again. Said she didn't receive any paperwork from our office. I informed her it was faxed yesterday. She said she didn't receive it and can we fax it again and also include demographics.       Her phone number is [phone]    The fax number is [phone]

## 2019-07-24 ENCOUNTER — TELEPHONE (OUTPATIENT)
Dept: CARDIAC REHAB | Age: 69
End: 2019-07-24

## 2019-07-24 NOTE — TELEPHONE ENCOUNTER
7/24/2019 Cardiac Rehab: Called Kit Laura Jennifer  to discuss participation in the Cardiac Rehab Program following [facility] on 5/31/19. Left voicemail message. Will follow the week of 7/25/19.  Eloise Wolfe RN

## 2019-07-25 ENCOUNTER — TELEPHONE (OUTPATIENT)
Dept: CARDIAC REHAB | Age: 69
End: 2019-07-25

## 2019-07-25 NOTE — TELEPHONE ENCOUNTER
7/25/2019 Cardiac Rehab: Called Mr. Silvestre Kiser  to discuss participation in the Cardiac Rehab Program . Left voicemail message. Final call 8/1/2019 if no return call. Millie Luis RN      7/24/2019 Cardiac Rehab: Called Mr. Shani Brennan  to discuss participation in the Cardiac Rehab Program following [facility] on 5/31/19. Left voicemail message. Will follow the week of 7/25/19.  Gene Lira RN

## 2019-07-31 ENCOUNTER — OFFICE VISIT (OUTPATIENT)
Dept: CARDIOLOGY CLINIC | Age: 69
End: 2019-07-31

## 2019-07-31 VITALS
DIASTOLIC BLOOD PRESSURE: 70 MMHG | SYSTOLIC BLOOD PRESSURE: 118 MMHG | TEMPERATURE: 98.5 F | BODY MASS INDEX: 26.62 KG/M2 | HEART RATE: 90 BPM | WEIGHT: 207.4 LBS | RESPIRATION RATE: 20 BRPM | OXYGEN SATURATION: 97 % | HEIGHT: 74 IN

## 2019-07-31 DIAGNOSIS — I25.5 ISCHEMIC CARDIOMYOPATHY: ICD-10-CM

## 2019-07-31 DIAGNOSIS — I50.9 STAGE C CHRONIC COMBINED CONGESTIVE HEART FAILURE (HCC): Primary | ICD-10-CM

## 2019-07-31 RX ORDER — TAMSULOSIN HYDROCHLORIDE 0.4 MG/1
0.4 CAPSULE ORAL DAILY
Refills: 3 | COMMUNITY
Start: 2019-07-18 | End: 2020-01-01 | Stop reason: SDUPTHER

## 2019-07-31 RX ORDER — TORSEMIDE 20 MG/1
40 TABLET ORAL 2 TIMES DAILY
Qty: 360 TAB | Refills: 3 | Status: SHIPPED | OUTPATIENT
Start: 2019-07-31 | End: 2019-01-01

## 2019-07-31 RX ORDER — CARVEDILOL 6.25 MG/1
6.25 TABLET ORAL 2 TIMES DAILY WITH MEALS
Qty: 180 TAB | Refills: 3 | Status: SHIPPED | OUTPATIENT
Start: 2019-07-31 | End: 2020-01-01

## 2019-07-31 RX ORDER — AMIODARONE HYDROCHLORIDE 200 MG/1
200 TABLET ORAL 2 TIMES DAILY
Qty: 180 TAB | Refills: 0 | Status: ON HOLD | OUTPATIENT
Start: 2019-07-31 | End: 2019-01-01 | Stop reason: SDUPTHER

## 2019-07-31 NOTE — PROGRESS NOTES
Leeann Note DOS:  7/31/2019 REFERRING PROVIDER:  Andrea Apley, MD 
PRIMARY CARE PHYSICIAN: Santiago Dill MD 
PRIMARY CARDIOLOGIST: Andrea Apley, MD 
 
 
 
 
IMPRESSION/PLAN Chief Complaint Patient presents with  CHF  Leg Swelling HPI: Spenser Mckeon is a 76y.o. year old pleasant white male with a history of HTN, HL, likey JOSE, CAD s/p cardiac arrest VFib s/p CABG (2011) c/b sternal would infection and sternectomy, ischemic cardiomyopathy LVEF 15-20%, s/p ICD and with LBBB. Patient admitted with acute on chronic systolic heart failure with massive volume overload > 20 lbs, in the setting of atrial fibrillation s/p failed DCCV and new UTI now treated with IV antibiotics. Hx of chronic UTI. He underwent DCCV and RHC on 6/18. S/p BiVICD on 6/21/19 with Dr. Leo Snider. His IV milrinone and IV bumex were discontinued and his renal functioning worsened. The milrinone gtt was resumed and he was discharged home home on IV milrinone on 6/26/19. Mr. Michelle Montes De Oca returns to clinic for follow up with his wife. Recent labs reveal a persistent elevation in his creatinine of 2.49. His home blood pressures range from 102-115 mmHg. He denies orthopnea, PND, edema. He developed dyspnea with walking to the clinic from the parking lot, having to sit down and rest on the first floor. He is performing ADL at home but is not working in the yard or on the tractor. He notes a dry mouth, particularly at night. He denies presyncope or syncope. He had been taking carvedilol 6.25 mg in the morning and 12.5 mg in the evening Plan:  
Decrease torsemide to 40 mg BID Reduce Coreg to 6.25 mg po BID Continue spironolactone 25mg daily Continue amiodarone 200mg po BID Continue apixaban for [facility] Intolerant to ACE/ARB/ARNi due to renal dysfunction Labs drawn on 7/29: BMP, NTproBNP, INR - not available today Schedule ECHO in 3 months Low Na+ diet Daily weights Sleep study referral- suspected JOSE Urology referral- chronic UTI,  + nocturia with hesitancy Nephrology referral - [phone] Follow up with primary Cardiologist, Dr. James Brewer Follow up with EP, Dr. Chapincito Barcenas interrogation Follow up with PCP, Sheree De Anda MD  
Follow up with [facility] in 23 weeks with NP/MD 
 
IMPRESSION: 
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 CARDIAC EVALUATION 
ECHO (5/31/19) LVEF 21-25%, severely dilated LA, moderately dilated RA 
ECHO (6/24/19) LVEF 16-20%, Severely dilated LV, trace MR, trace TR 
 
EKG (6/12/19) atrial flutter with occasional PVCs, LBBB QRS 158ms EKG (6/26/19) Atrial fibrillation with premature ventricular or aberrantly conducted complexes, Left bundle branch block, rate 86, , QTc 564 [facility] not in epic Review of Systems:    
General:  Denies fever, fatigue HEENT: Denies angioedema, epistaxis Pulmonary: Denies cough, wheeze, hemoptysis Cardiac: Denies exertional chest pain, palpitations, syncope, near syncope, orthopnea, PND, bleeding, claudication, AICD firing GI:  Denies  abdominal pain, change in bowel habits, or black or bloody stools Musculo: Denies myalgias, arthralgias Neuro: Denies headaches, CVA/TIA sx Skin:  Denies rash Heme: Denies bruising, bleeding, lymphadenopathy Psych: Denies anxiety or depression Physical Exam: Visit Vitals /70 (BP 1 Location: Left arm, BP Patient Position: Sitting) Pulse 90 Temp 98.5 °F (36.9 °C) (Oral) Resp 20 Ht 6' 2\" (1.88 m) Wt 207 lb 6.4 oz (94.1 kg) SpO2 97% BMI 26.63 kg/m² General:  AAOx3 cooperative, no acute distress. HEENT:  Atraumatic. Pink and moist.  Anicteric sclerae. Neck:   Supple, no adenopoathy. Lungs:  Clear but diminished posterior, No wheezing/rhonchi/rales. Heart:   Median sternotomy scar, sternectomy, AICD in left upper chest wall:  Irregular rhythm, S1, S2 present, no murmur, no rubs, S3 gallop. JVD 8 cm (-) HJR . No carotid bruits. Abdomen:  Soft, non-distended, non-tender. + Bowel sounds. No bruits. Extremities:  Trace BLE edema, no clubbing, no cyanosis. No calf tenderness Neurologic:  Grossly intact. Alert and oriented X 3. No acute neurological distress. Skin:   intact, no wounds, scattered ecchymosis and bruising, no rashes Psych:  Good insight. Not anxious nor agitated. History: 
Past Medical History:  
Diagnosis Date  Degenerative disc disease, lumbar  Heart failure ([address]  High cholesterol  Hypertension  Paroxysmal atrial fibrillation ([address] 4/2/2019  Spinal stenosis Past Surgical History:  
Procedure Laterality Date  HX APPENDECTOMY  HX CORONARY ARTERY BYPASS GRAFT    
 triple  HX HERNIA REPAIR    
 HX IMPLANTABLE CARDIOVERTER DEFIBRILLATOR  [place] CARDIOVERSION ELECTIVE ARRHYTHMIA EXTERNAL N/A 6/10/2019 EP CARDIOVERSION performed by Deandra Clay MD at [address] CATH LAB  [place] CARDIOVERSION ELECTIVE ARRHYTHMIA EXTERNAL N/A 6/18/2019 EP CARDIOVERSION performed by Patsy Davis MD at [address] CATH LAB  [place] INSJ/RPLCMT PERM DFB W/TRNSVNS LDS 1/DUAL CHMBR N/A 6/21/2019 INSERT ICD BIV MULTI performed by Gabriel Holt MD at [address] CATH LAB Social History Socioeconomic History  Marital status:  Spouse name: Not on file  Number of children: Not on file  Years of education: Not on file  Highest education level: Not on file Occupational History  Not on file Social Needs  Financial resource strain: Not on file  Food insecurity:  
  Worry: Not on file Inability: Not on file  Transportation needs:  
  Medical: Not on file Non-medical: Not on file Tobacco Use  Smoking status: Former Smoker Last attempt to quit: 2010 Years since quittin.6  Smokeless tobacco: Never Used Substance and Sexual Activity  Alcohol use: Yes Comment: rarely  Drug use: Never  Sexual activity: Not on file Lifestyle  Physical activity:  
  Days per week: Not on file Minutes per session: Not on file  Stress: Not on file Relationships  Social connections:  
  Talks on phone: Not on file Gets together: Not on file Attends Zoroastrianism service: Not on file Active member of club or organization: Not on file Attends meetings of clubs or organizations: Not on file Relationship status: Not on file  Intimate partner violence:  
  Fear of current or ex partner: Not on file Emotionally abused: Not on file Physically abused: Not on file Forced sexual activity: Not on file Other Topics Concern  Not on file Social History Narrative  Not on file No family history on file. Current Medications:  
Current Outpatient Medications Medication Sig Dispense Refill  tamsulosin (FLOMAX) 0.4 mg capsule Take 0.4 mg by mouth daily. 3  
 levoFLOXacin (LEVAQUIN) 500 mg tablet  torsemide (DEMADEX) 20 mg tablet Take 3 Tabs by mouth two (2) times a day. 540 Tab 1  
 amiodarone (CORDARONE) 200 mg tablet Take 1 Tab by mouth two (2) times a day. 40 Tab 0  
 milrinone (PRIMACOR) 20 mg/100 mL (200 mcg/mL) infusion 18.14 mcg/min by IntraVENous route continuous.  1 mL 0  
 carvedilol (COREG) 6.25 mg tablet Take 1 Tab by mouth two (2) times daily (with meals). 40 Tab 0  
 aspirin delayed-release 81 mg tablet Take 81 mg by mouth daily.  apixaban (ELIQUIS) 5 mg tablet Take 5 mg by mouth two (2) times a day.  glucosamine/chondr vyas A sod (OSTEO BI-FLEX PO) Take 1 Tab by mouth two (2) times a day.  evolocumab (REPATHA SURECLICK) pen injection 138 mg by SubCUTAneous route every fourteen (14) days.  spironolactone (ALDACTONE) 25 mg tablet Take 25 mg by mouth daily. Allergies: No Known Allergies Recent Labs:  
Labs Latest Ref Rng & Units 7/12/2019 6/26/2019 6/25/2019 6/24/2019 6/23/2019 6/22/2019 6/22/2019 WBC 4.1 - 11.1 K/uL - 6.7 6.5 7.1 7.9 - -  
RBC 4.10 - 5.70 M/uL - 3.80(L) 4.01(L) 4.22 4.16 - - Hemoglobin 12.1 - 17.0 g/dL - 11. 4(L) 12.2 12.5 12.7 - - Hematocrit 36.6 - 50.3 % - 34. 7(L) 36.7 38.3 37.7 - - MCV 80.0 - 99.0 FL - 91.3 91.5 90.8 90.6 - - Platelets 597 - 523 K/uL - 175 184 192 207 - - Lymphocytes 12 - 49 % - - 18 - 14 - - Monocytes 5 - 13 % - - 12 - 13 - - Eosinophils 0 - 7 % - - 1 - 1 - - Basophils 0 - 1 % - - 1 - 1 - - Bands 0 - 6 % - - - - - - - Albumin 3.5 - 5.0 g/dL - 3. 0(L) 3. 2(L) 3. 1(L) - 3. 3(L) -  
Calcium 8.6 - 10.2 mg/dL 9.4 8.8 9.2 9.2 9.6 9.3 9.3 SGOT 15 - 37 U/L - 12(L) 18 12(L) - 25 - Glucose 65 - 99 mg/dL 89 111(H) 100 107(H) 209(H) 88 95 BUN 8 - 27 mg/dL 40(H) 43(H) 44(H) 39(H) 36(H) 31(H) 31(H) Creatinine 0.76 - 1.27 mg/dL 2.47(H) 2.31(H) 2.32(H) 2.23(H) 2.37(H) 2.02(H) 2.05(H) Sodium 134 - 144 mmol/L 137 134(L) 132(L) 132(L) 131(L) 133(L) 132(L) Potassium 3.5 - 5.2 mmol/L 4.3 3.6 3.7 3.6 3.6 3.7 3.6 TSH 0.36 - 3.74 uIU/mL - - - - - - - Some recent data might be hidden Lab Results Component Value Date/Time WBC 6.7 06/26/2019 03:00 AM  
 HGB 11.4 (L) 06/26/2019 03:00 AM  
 HCT 34.7 (L) 06/26/2019 03:00 AM  
 PLATELET 391 08/40/2949 03:00 AM  
 MCV 91.3 06/26/2019 03:00 AM  
 
Lab Results Component Value Date/Time GFR est non-AA 26 (L) 07/12/2019 12:00 AM  
 GFR est AA 30 (L) 07/12/2019 12:00 AM  
 Creatinine 2.47 (H) 07/12/2019 12:00 AM  
 BUN 40 (H) 07/12/2019 12:00 AM  
 Sodium 137 07/12/2019 12:00 AM  
 Potassium 4.3 07/12/2019 12:00 AM  
 Chloride 93 (L) 07/12/2019 12:00 AM  
 CO2 25 07/12/2019 12:00 AM  
 Magnesium 2.4 06/26/2019 03:00 AM  
 Phosphorus 2.4 (L) 06/04/2019 04:09 AM  
 
Lab Results Component Value Date/Time TSH 2.45 06/01/2019 04:16 AM  
  
Lab Results Component Value Date/Time Sodium 137 07/12/2019 12:00 AM  
 Potassium 4.3 07/12/2019 12:00 AM  
 Chloride 93 (L) 07/12/2019 12:00 AM  
 CO2 25 07/12/2019 12:00 AM  
 Anion gap 8 06/26/2019 03:00 AM  
 Glucose 89 07/12/2019 12:00 AM  
 BUN 40 (H) 07/12/2019 12:00 AM  
 Creatinine 2.47 (H) 07/12/2019 12:00 AM  
 BUN/Creatinine ratio 16 07/12/2019 12:00 AM  
 GFR est AA 30 (L) 07/12/2019 12:00 AM  
 GFR est non-AA 26 (L) 07/12/2019 12:00 AM  
 Calcium 9.4 07/12/2019 12:00 AM  
 Bilirubin, total 0.5 06/26/2019 03:00 AM  
 ALT (SGPT) 14 06/26/2019 03:00 AM  
 AST (SGOT) 12 (L) 06/26/2019 03:00 AM  
 Alk. phosphatase 86 06/26/2019 03:00 AM  
 Protein, total 6.7 06/26/2019 03:00 AM  
 Albumin 3.0 (L) 06/26/2019 03:00 AM  
 Globulin 3.7 06/26/2019 03:00 AM  
 A-G Ratio 0.8 (L) 06/26/2019 03:00 AM  
  
No results found for: HBA1C, LFH9FFCJ, HGBE8, BWP8XAUS, AWQ9MLPR, [id number] Thank you for letting us see him with you, Mounika Morales MD, Guevara Garza Chief of Cardiology, [facility] Medical Director Calos Rueda [address], [address] Office [phone] Fax [phone]

## 2019-07-31 NOTE — LETTER
7/31/2019 2:13 PM 
 
Patient:  Diamond Michel YOB: 1950 Date of Visit: 7/31/2019 Dear Ginny Moreland MD 
[address] [facility] [address] [address] VIA In Basket Vivian Ann MD 
[facility] [address] [address] VIA Facsimile: [phone] Julius Rodriguez MD 
[facility] [address] [address] VIA Facsimile: [phone] 
 : Thank you for referring Mr. Sona Kiser to me for evaluation/treatment. Below are the relevant portions of my assessment and plan of care. If you have questions, please do not hesitate to call me. I look forward to following Mr. Silvio Cuevas along with you. Sincerely, Mando Toledo MD

## 2019-07-31 NOTE — PATIENT INSTRUCTIONS
1. Decrease torsemide to 40 mg twice daily ( torsemide 20 mg, 2 tablets twice daily) 2. Reduce carvedilol to 6.25 mg, 1 tablet twice daily 3. Repeat blood work weekly 4. Follow up with Dr. Jose Pat on August 9, 2019 5. Follow up with the [facility] in 3 weeks

## 2019-08-06 NOTE — TELEPHONE ENCOUNTER
Tashi Andrews, patient's home health nurse, called to state she did a finger stick PT/INR on patient and the results were INR 1.4 and PT 17.

## 2019-08-07 NOTE — TELEPHONE ENCOUNTER
8/7/2019 Cardiac Rehab: Final call placed to [address] [place] [address]. He has participated in cardiac rehab after his \"bypass\". He has an appointment with Dr. Tavia Kidd this week. He will discuss with her and call us if he decides to re-enroll. Magy Gruber RN     7/25/2019 Cardiac Rehab: Called Mr. Tabatha Kiser  to discuss participation in the Cardiac Rehab Program . Left voicemail message. Final call 8/1/2019 if no return call. Kalie Garcia RN     [id number] [facility] Rehab: Called Mr. Sona ACUNA Margi  to discuss participation in the Cardiac Rehab Program following [facility] on 5/31/19. Left voicemail message.  Will follow the week of 7/25/19. Courtney Agee RN

## 2019-08-14 NOTE — TELEPHONE ENCOUNTER
I called the [place] [facility] for patient and requested all labs for the week, as we only received the INR. Jasmyne told me they had drawn all labs and sent them. I asked her to resend them as we cannot locate them. We received the CBC, but no CMP or Probnp. I called back and they stated they would send me the labs. 8/15/19 - I called Jasmyne at [facility] today to request CMP and [address]. She stated she would refax it to me.

## 2019-08-19 NOTE — TELEPHONE ENCOUNTER
Lab work reviewed by Dr. Georgiana Valdez. Per Dr. Georgiana Valdez labs stable. No changes recommended at this time. Attempted to contact patient to notify. Message left. Will await return call. Rebecca Pereyra RN.

## 2019-08-19 NOTE — TELEPHONE ENCOUNTER
Patient returned call. Informed him of lab results and recommendations. He verbalized understanding and had no further question at this time. Alba Emeyr RN.

## 2019-08-21 NOTE — TELEPHONE ENCOUNTER
Called patient to reschedule his next appointment with our office.     Appointment has been rescheduled to Friday August 23rd at 9:30am with Norberto Krueger NP

## 2019-08-23 PROBLEM — I50.22 SYSTOLIC CHF, CHRONIC (HCC): Status: ACTIVE | Noted: 2019-01-01

## 2019-08-23 NOTE — PROGRESS NOTES
[facility] Clinic Note      DOS:  8/23/2019  REFERRING PROVIDER:  Gorge Shields MD  PRIMARY CARE PHYSICIAN: Ashu Watkins MD  PRIMARY CARDIOLOGIST: Gorge Shields MD          IMPRESSION/PLAN    Chief Complaint   Patient presents with    CHF         HPI: Staci Caldwell is a 71y.o. year old pleasant white male with a history of HTN, HL, likey JOSE, CAD s/p cardiac arrest VFib s/p CABG (2011) c/b sternal would infection and sternectomy, ischemic cardiomyopathy LVEF 15-20%, s/p ICD and with LBBB. Patient admitted with acute on chronic systolic heart failure with massive volume overload > 20 lbs, in the setting of atrial fibrillation s/p failed DCCV and new UTI now treated with IV antibiotics. Hx of chronic UTI. He underwent DCCV and RHC on 6/18. S/p BiVICD on 6/21/19 with Dr. Israel Walden. His IV milrinone and IV bumex were discontinued and his renal functioning worsened. The milrinone gtt was resumed and he was discharged home home on IV milrinone on 6/26/19. Mr. Batool Nichols returns to clinic for follow up with his wife. His home blood pressures range from 102-115 mmHg. He developed dyspnea with walking to the clinic from the parking lot, having to sit down and rest on the first floor. He is performing ADL at home but is not working in the yard or on the tractor. He notes a dry mouth, particularly at night. He denies presyncope or syncope. Recent labs reveal improvement in his creatinine from 2.49 to 1.93. His last clinic visit torsemide was decreased to 40mg BID but Mr Batool Nichols increased the torsemide dose back to 60mg BID 2/2 increased BLE edema and DOUGLAS/SOB. He is scheduled to see Dr. Gabriela Jones next week (8/29/19). He denies orthopnea or PND. He is scheduled to see sleep medicine on 9/3/19 for evaluation of JOSE/CPAP therapy.        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 Michael Rowe (9/5/19)  Follow up with EP; Dr. Cisco Oliver interrogation    Follow up with PCP, Eloise Lester MD   Follow- up with nephrologist; Forrest Singh re: CKD 3/4 (8/29/19)  Follow-up with Urology; Dr. 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           CARDIAC EVALUATION  ECHO (5/31/19) LVEF 21-25%, severely dilated LA, moderately dilated RA  ECHO (6/24/19) LVEF 16-20%, Severely dilated LV, trace MR, trace TR    EKG (6/12/19) atrial flutter with occasional PVCs, LBBB QRS 158ms  EKG (6/26/19) Atrial fibrillation with premature ventricular or aberrantly conducted complexes, Left bundle branch block, rate 86, , QTc 564     [facility] not in epic    Review of Systems:     General:  Denies fever, fatigue   HEENT: Denies angioedema, epistaxis  Pulmonary: Denies cough, wheeze, hemoptysis Cardiac: Denies exertional chest pain, palpitations, syncope, near syncope, orthopnea, PND, bleeding, claudication, AICD firing   GI:  Denies  abdominal pain, + abdominal bloating, change in bowel habits, or black or bloody stools  Musculo: Denies myalgias, arthralgias, + BLE edema  Neuro: Denies headaches, CVA/TIA sx  Skin:  Denies rash  Heme: Denies bruising, bleeding, lymphadenopathy  Psych: Denies anxiety or depression    Physical Exam:     Visit Vitals  /68 (BP 1 Location: Left arm, BP Patient Position: Sitting)   Pulse 70   Temp 97.4 °F (36.3 °C) (Oral)   Resp 20   Ht 6' 2\" (1.88 m)   Wt 202 lb (91.6 kg)   SpO2 97%   BMI 25.94 kg/m²         General:  AAOx3 cooperative, no acute distress. HEENT:  Atraumatic. Pink and moist.  Anicteric sclerae. Neck:   Supple, no adenopoathy. Lungs:  Clear but diminished posterior, No wheezing/rhonchi/rales. Heart:   Median sternotomy scar, sternectomy, AICD in left upper chest wall:  Irregular rhythm, S1, S2 present, no murmur, no rubs, S3 gallop. JVD 8 cm (-) HJR . No carotid bruits. Abdomen:  Soft, non-distended, non-tender. + Bowel sounds. No bruits. Extremities:  +1pitting BLE edema, no clubbing, no cyanosis. No calf tenderness  Neurologic:  Grossly intact. Alert and oriented X 3. No acute neurological distress. Skin:   intact, no wounds, scattered ecchymosis and bruising, no rashes   Psych:  Good insight. Not anxious nor agitated.       History:  Past Medical History:   Diagnosis Date    Degenerative disc disease, lumbar     Heart failure (HCC)     High cholesterol     Hypertension     Paroxysmal atrial fibrillation ([facility] [address] 4/2/2019    Spinal stenosis      Past Surgical History:   Procedure Laterality Date    HX APPENDECTOMY      HX CORONARY ARTERY BYPASS GRAFT      triple    HX HERNIA REPAIR      HX IMPLANTABLE CARDIOVERTER DEFIBRILLATOR      [place] CARDIOVERSION ELECTIVE ARRHYTHMIA EXTERNAL N/A 6/10/2019    EP CARDIOVERSION performed by Ledy Wood, Becca Sandra MD at [address], [facility]  CATH LAB    [place] CARDIOVERSION ELECTIVE ARRHYTHMIA EXTERNAL N/A 2019    EP CARDIOVERSION performed by Arron Shelton MD at [address], [facility]    [place] INSJ/RPLCMT PERM DFB W/TRNSVNS LDS 1/DUAL CHMBR N/A 2019    INSERT ICD BIV MULTI performed by Dexter Guzman MD at [address], [facility]     Social History     Socioeconomic History    Marital status:      Spouse name: Not on file    Number of children: Not on file    Years of education: Not on file    Highest education level: Not on file   Occupational History    Not on file   Social Needs    Financial resource strain: Not on file    Food insecurity:     Worry: Not on file     Inability: Not on file    Transportation needs:     Medical: Not on file     Non-medical: Not on file   Tobacco Use    Smoking status: Former Smoker     Last attempt to quit: 2010     Years since quittin.7    Smokeless tobacco: Never Used   Substance and Sexual Activity    Alcohol use: Yes     Comment: rarely    Drug use: Never    Sexual activity: Not on file   Lifestyle    Physical activity:     Days per week: Not on file     Minutes per session: Not on file    Stress: Not on file   Relationships    Social connections:     Talks on phone: Not on file     Gets together: Not on file     Attends Shinto service: Not on file     Active member of club or organization: Not on file     Attends meetings of clubs or organizations: Not on file     Relationship status: Not on file    Intimate partner violence:     Fear of current or ex partner: Not on file     Emotionally abused: Not on file     Physically abused: Not on file     Forced sexual activity: Not on file   Other Topics Concern    Not on file   Social History Narrative    Not on file     No family history on file.     Current Medications:   Current Outpatient Medications   Medication Sig Dispense Refill    torsemide (DEMADEX) 20 mg tablet Take 2 Tabs by mouth two (2) times a day. 360 Tab 3    carvedilol (COREG) 6.25 mg tablet Take 1 Tab by mouth two (2) times daily (with meals). 180 Tab 3    amiodarone (CORDARONE) 200 mg tablet Take 1 Tab by mouth two (2) times a day. 180 Tab 0    milrinone (PRIMACOR) 20 mg/100 mL (200 mcg/mL) infusion 18.14 mcg/min by IntraVENous route continuous. 1 mL 0    aspirin delayed-release 81 mg tablet Take 81 mg by mouth daily.  apixaban (ELIQUIS) 5 mg tablet Take 5 mg by mouth two (2) times a day.  evolocumab (REPATHA SURECLICK) pen injection 114 mg by SubCUTAneous route every fourteen (14) days.  spironolactone (ALDACTONE) 25 mg tablet Take 25 mg by mouth daily.  tamsulosin (FLOMAX) 0.4 mg capsule Take 0.4 mg by mouth daily. 3    levoFLOXacin (LEVAQUIN) 500 mg tablet       glucosamine/chondr vyas A sod (OSTEO BI-FLEX PO) Take 1 Tab by mouth two (2) times a day. Allergies: No Known Allergies        Recent Labs:   Labs Latest Ref Rng & Units 7/12/2019 6/26/2019 6/25/2019 6/24/2019   WBC 4.1 - 11.1 K/uL - 6.7 6.5 7.1   RBC 4.10 - 5.70 M/uL - 3.80(L) 4.01(L) 4.22   Hemoglobin 12.1 - 17.0 g/dL - 11. 4(L) 12.2 12.5   Hematocrit 36.6 - 50.3 % - 34. 7(L) 36.7 38.3   MCV 80.0 - 99.0 FL - 91.3 91.5 90.8   Platelets 954 - 044 K/uL - 175 184 192   Lymphocytes 12 - 49 % - - 18 -   Monocytes 5 - 13 % - - 12 -   Eosinophils 0 - 7 % - - 1 -   Basophils 0 - 1 % - - 1 -   Albumin 3.5 - 5.0 g/dL - 3. 0(L) 3. 2(L) 3. 1(L)   Calcium 8.6 - 10.2 mg/dL 9.4 8.8 9.2 9.2   SGOT 15 - 37 U/L - 12(L) 18 12(L)   Glucose 65 - 99 mg/dL 89 111(H) 100 107(H)   BUN 8 - 27 mg/dL 40(H) 43(H) 44(H) 39(H)   Creatinine 0.76 - 1.27 mg/dL 2.47(H) 2.31(H) 2.32(H) 2.23(H)   Sodium 134 - 144 mmol/L 137 134(L) 132(L) 132(L)   Potassium 3.5 - 5.2 mmol/L 4.3 3.6 3.7 3.6   Some recent data might be hidden     Lab Results   Component Value Date/Time    WBC 6.7 06/26/2019 03:00 AM    HGB 11.4 (L) 06/26/2019 03:00 AM    HCT 34.7 (L) 06/26/2019 03:00 AM    PLATELET 175 06/26/2019 03:00 AM    MCV 91.3 06/26/2019 03:00 AM     Lab Results   Component Value Date/Time    GFR est non-AA 26 (L) 07/12/2019 12:00 AM    GFR est AA 30 (L) 07/12/2019 12:00 AM    Creatinine 2.47 (H) 07/12/2019 12:00 AM    BUN 40 (H) 07/12/2019 12:00 AM    Sodium 137 07/12/2019 12:00 AM    Potassium 4.3 07/12/2019 12:00 AM    Chloride 93 (L) 07/12/2019 12:00 AM    CO2 25 07/12/2019 12:00 AM    Magnesium 2.4 06/26/2019 03:00 AM    Phosphorus 2.4 (L) 06/04/2019 04:09 AM     Lab Results   Component Value Date/Time    TSH 2.45 06/01/2019 04:16 AM      Lab Results   Component Value Date/Time    Sodium 137 07/12/2019 12:00 AM    Potassium 4.3 07/12/2019 12:00 AM    Chloride 93 (L) 07/12/2019 12:00 AM    CO2 25 07/12/2019 12:00 AM    Anion gap 8 06/26/2019 03:00 AM    Glucose 89 07/12/2019 12:00 AM    BUN 40 (H) 07/12/2019 12:00 AM    Creatinine 2.47 (H) 07/12/2019 12:00 AM    BUN/Creatinine ratio 16 07/12/2019 12:00 AM    GFR est AA 30 (L) 07/12/2019 12:00 AM    GFR est non-AA 26 (L) 07/12/2019 12:00 AM    Calcium 9.4 07/12/2019 12:00 AM    Bilirubin, total 0.5 06/26/2019 03:00 AM    ALT (SGPT) 14 06/26/2019 03:00 AM    AST (SGOT) 12 (L) 06/26/2019 03:00 AM    Alk.  phosphatase 86 06/26/2019 03:00 AM    Protein, total 6.7 06/26/2019 03:00 AM    Albumin 3.0 (L) 06/26/2019 03:00 AM    Globulin 3.7 06/26/2019 03:00 AM    A-G Ratio 0.8 (L) 06/26/2019 03:00 AM      No results found for: HBA1C, TYZ0TKGG, HGBE8, ESZ7TZTZ, HVB2YIPO, RUO3HHDN       Thank you for letting us see him with you,      Lilo Guo NP  [address] [place] [facility]  [address] [facility], [address] [place] [address]  Office [phone]  Fax [phone]

## 2019-09-03 NOTE — PATIENT INSTRUCTIONS
[address] [facility] [place]. [place], [address]  Tel.  [phone]  Fax. [phone] [facility]  José Miguel, [address] [facility]  Tel.  [phone]  Fax. [phone] [address]  Tel.  [phone]  Fax. [phone]     Sleep Apnea: After Your Visit  Your Care Instructions  Sleep apnea occurs when you frequently stop breathing for 10 seconds or longer during sleep. It can be mild to severe, based on the number of times per hour that you stop breathing or have slowed breathing. Blocked or narrowed airways in your nose, mouth, or throat can cause sleep apnea. Your airway can become blocked when your throat muscles and tongue relax during sleep. Sleep apnea is common, occurring in 1 out of 20 individuals. Individuals having any of the following characteristics should be evaluated and treated right away due to high risk and detrimental consequences from untreated sleep apnea:  1. Obesity  2. Congestive Heart failure  3. Atrial Fibrillation  4. Uncontrolled Hypertension  5. Type II Diabetes  6. Night-time Arrhythmias  7. Stroke  8. Pulmonary Hypertension  9. High-risk Driving Populations (pilots, truck drivers, etc.)  10. Patients Considering Weight-loss Surgery    How do you know you have sleep apnea? You probably have sleep apnea if you answer 'yes' to 3 or more of the following questions:  S - Have you been told that you Snore? T - Are you often Tired during the day? O - Has anyone Observed you stop breathing while sleeping? P- Do you have (or are being treated for) high blood Pressure? B - Are you obese (Body Mass Index > 35)? A - Is your Age 48years old or older? N - Is your Neck size greater than 16 inches? G - Are you male Gender? A sleep physician can prescribe a breathing device that prevents tissues in the throat from blocking your airway.  Or your doctor may recommend using a dental device (oral breathing device) to help keep your airway open. In some cases, surgery may be needed to remove enlarged tissues in the throat. Follow-up care is a key part of your treatment and safety. Be sure to make and go to all appointments, and call your doctor if you are having problems. It's also a good idea to know your test results and keep a list of the medicines you take. How can you care for yourself at home? · Lose weight, if needed. It may reduce the number of times you stop breathing or have slowed breathing. · Go to bed at the same time every night. · Sleep on your side. It may stop mild apnea. If you tend to roll onto your back, sew a pocket in the back of your pajama top. Put a tennis ball into the pocket, and stitch the pocket shut. This will help keep you from sleeping on your back. · Avoid alcohol and medicines such as sleeping pills and sedatives before bed. · Do not smoke. Smoking can make sleep apnea worse. If you need help quitting, talk to your doctor about stop-smoking programs and medicines. These can increase your chances of quitting for good. · Prop up the head of your bed 4 to 6 inches by putting bricks under the legs of the bed. · Treat breathing problems, such as a stuffy nose, caused by a cold or allergies. · Use a continuous positive airway pressure (CPAP) breathing machine if lifestyle changes do not help your apnea and your doctor recommends it. The machine keeps your airway from closing when you sleep. · If CPAP does not help you, ask your doctor whether you should try other breathing machines. A bilevel positive airway pressure machine has two types of air pressureâone for breathing in and one for breathing out. Another device raises or lowers air pressure as needed while you breathe. · If your nose feels dry or bleeds when using one of these machines, talk with your doctor about increasing moisture in the air. A humidifier may help.   · If your nose is runny or stuffy from using a breathing machine, talk with your doctor about using decongestants or a corticosteroid nasal spray. When should you call for help? Watch closely for changes in your health, and be sure to contact your doctor if:  · You still have sleep apnea even though you have made lifestyle changes. · You are thinking of trying a device such as CPAP. · You are having problems using a CPAP or similar machine. Where can you learn more? Go to [employer]. Enter S340 in the search box to learn more about \"Sleep Apnea: After Your Visit. \"   © 0225-7838 Healthwise, Incorporated. Care instructions adapted under license by [facility] [employer] (which disclaims liability or warranty for this information). This care instruction is for use with your licensed healthcare professional. If you have questions about a medical condition or this instruction, always ask your healthcare professional. Simon Sethi any warranty or liability for your use of this information. PROPER SLEEP HYGIENE    What to avoid  · Do not have drinks with caffeine, such as coffee or black tea, for 8 hours before bed. · Do not smoke or use other types of tobacco near bedtime. Nicotine is a stimulant and can keep you awake. · Avoid drinking alcohol late in the evening, because it can cause you to wake in the middle of the night. · Do not eat a big meal close to bedtime. If you are hungry, eat a light snack. · Do not drink a lot of water close to bedtime, because the need to urinate may wake you up during the night. · Do not read or watch TV in bed. Use the bed only for sleeping and sexual activity. What to try  · Go to bed at the same time every night, and wake up at the same time every morning. Do not take naps during the day. · Keep your bedroom quiet, dark, and cool. · Get regular exercise, but not within 3 to 4 hours of your bedtime. .  · Sleep on a comfortable pillow and mattress.   · If watching the clock makes you anxious, turn it facing away from you so you cannot see the time. · If you worry when you lie down, start a worry book. Well before bedtime, write down your worries, and then set the book and your concerns aside. · Try meditation or other relaxation techniques before you go to bed. · If you cannot fall asleep, get up and go to another room until you feel sleepy. Do something relaxing. Repeat your bedtime routine before you go to bed again. · Make your house quiet and calm about an hour before bedtime. Turn down the lights, turn off the TV, log off the computer, and turn down the volume on music. This can help you relax after a busy day. Drowsy Driving  The [address] [place] [address] Road Traffic Safety Administration cites drowsiness as a causing factor in more than 975,472 police reported crashes annually, resulting in 76,000 injuries and 1,500 deaths. Other surveys suggest 55% of people polled have driven while drowsy in the past year, 23% had fallen asleep but not crashed, 3% crashed, and 2% had and accident due to drowsy driving. Who is at risk? Young Drivers: One study of drowsy driving accidents states that 55% of the drivers were under 25 years. Of those, 75% were male. Shift Workers and Travelers: People who work overnight or travel across time zones frequently are at higher risk of experiencing Circadian Rhythm Disorders. They are trying to work and function when their body is programed to sleep. Sleep Deprived: Lack of sleep has a serious impact on your ability to pay attention or focus on a task. Consistently getting less than the average of 8 hours your body needs creates partial or cumulative sleep deprivation. Untreated Sleep Disorders: Sleep Apnea, Narcolepsy, R.L.S., and other sleep disorders (untreated) prevent a person from getting enough restful sleep. This leads to excessive daytime sleepiness and increases the risk for drowsy driving accidents by up to 7 times.   Medications / Alcohol: Even over the counter medications can cause drowsiness. Medications that impair a drivers attention should have a warning label. Alcohol naturally makes you sleepy and on its own can cause accidents. Combined with excessive drowsiness its effects are amplified. Signs of Drowsy Driving:   * You don't remember driving the last few miles   * You may drift out of your robin   * You are unable to focus and your thoughts wander   * You may yawn more often than normal   * You have difficulty keeping your eyes open / nodding off   * Missing traffic signs, speeding, or tailgating  Prevention-   Good sleep hygiene, lifestyle and behavioral choices have the most impact on drowsy driving. There is no substitute for sleep and the average person requires 8 hours nightly. If you find yourself driving drowsy, stop and sleep. Consider the sleep hygiene tips provided during your visit as well. Medication Refill Policy: Refills for all medications require 1 week advance notice. Please have your pharmacy fax a refill request. We are unable to fax, or call in \"controled substance\" medications and you will need to pick these prescriptions up from our office. [employer] Activation    Thank you for requesting access to [facility]. Please follow the instructions below to securely access and download your online medical record. [employer] allows you to send messages to your doctor, view your test results, renew your prescriptions, schedule appointments, and more. How Do I Sign Up? 1. In your internet browser, go to [email or web address]. 2. Click on the First Time User? Click Here link in the Sign In box. You will see the New Member Sign Up page. 3. Enter your [employer] Access Code exactly as it appears below. You will not need to use this code after youve completed the sign-up process. If you do not sign up before the expiration date, you must request a new code.     [employer] Access Code: [email or web address]  Expires: 9/14/2019  2:15 PM (This is the date your [employer] access code will )    4. Enter the last four digits of your Social Security Number (xxxx) and Date of Birth (mm/dd/yyyy) as indicated and click Submit. You will be taken to the next sign-up page. 5. Create a eLifestylest ID. This will be your [employer] login ID and cannot be changed, so think of one that is secure and easy to remember. 6. Create a [employer] password. You can change your password at any time. 7. Enter your Password Reset Question and Answer. This can be used at a later time if you forget your password. 8. Enter your e-mail address. You will receive e-mail notification when new information is available in [address] 9. Click Sign Up. You can now view and download portions of your medical record. 10. Click the Download Summary menu link to download a portable copy of your medical information. Additional Information    If you have questions, please call [phone]. Remember, [employer] is NOT to be used for urgent needs. For medical emergencies, dial 911.

## 2019-09-06 NOTE — TELEPHONE ENCOUNTER
Perri Cristina called and stated she was due to draw weekly labs on patient but is out of purple top tubes. She asked is she could return Monday to draw his labs. I spoke to Lilo Guo and she said it was ok. Everet Lowers know Monday was okay.

## 2019-09-12 PROBLEM — I73.9 PERIPHERAL VASCULAR DISEASE (HCC): Status: ACTIVE | Noted: 2019-01-01

## 2019-09-12 NOTE — PROGRESS NOTES
[facility] Clinic Note      DOS:  9/12/2019  REFERRING PROVIDER:  Lyle Arenas MD  PRIMARY CARE PHYSICIAN: Reddy Watson MD  PRIMARY CARDIOLOGIST: Lyle Arenas MD          IMPRESSION/PLAN    Chief Complaint   Patient presents with    Follow-up     heart failure    Leg Swelling    Shortness of Breath     \"sometimes\"-depending on what he eats         HPI: Theresa Domingo is a 71y.o. year old pleasant white male with a history of HTN, HL, likey JOSE, CAD s/p cardiac arrest VFib s/p CABG (2011) c/b sternal would infection and sternectomy, ischemic cardiomyopathy LVEF 15-20%, s/p ICD and with LBBB. Patient admitted with acute on chronic systolic heart failure with massive volume overload > 20 lbs, in the setting of atrial fibrillation s/p failed DCCV and new UTI now treated with IV antibiotics. Hx of chronic UTI. He underwent DCCV and RHC on 6/18. S/p BiVICD on 6/21/19 with Dr. Roma Junior. His IV milrinone and IV bumex were discontinued and his renal functioning worsened. The milrinone gtt was resumed and he was discharged home home on IV milrinone on 6/26/19. Mr. Jennifer Pina returns to clinic for follow up with his wife. He is performing ADL at home and is now mowing the grass on a riding mower and using the weed eater to trim without limiting dyspnea. He notes lower extremity edema and elevates his feet to sleep. He also has frequent nocturia and awakens frequently from sleep gasping for breath. He was seen by Dr. Derril Dancer and is scheduled for a sleep study in early November. He denies presyncope or syncope. Recent labs reveal worsening creatinine from 1.93 to 2.36. He is currently taking torsemide 60 mg qam and 40 qpm. He was recently seen by Dr. Julianna Chapman and had a scan at their office.         Plan:   Continue torsemide to 60 mg po QAM, 40 mg QPM   Continue Coreg to 6.25 mg po BID   Continue spironolactone 25mg daily  Continue amiodarone 200mg po BID   Continue apixaban for [facility] Intolerant to ACE/ARB/ARNi due to renal dysfunction  Labs drawn with [place] weekly: BMP, NTproBNP, INR  Schedule ECHO with Dr. Ramsey Quiñones  Low Na+ diet  Daily weights  Sleep study scheduled for November  Recommend CardioMems to follow his volume status more closely - will discuss with Dr. 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 Ramsey Quiñones in 1 month   Follow up with EP; Dr. Mayank Hector interrogation    Follow up with PCP, Cheryl León MD   Follow- up with nephrologist; Hailee De Los Santos re: CKD 3/4   Follow-up with Urology; Dr. 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           CARDIAC EVALUATION  ECHO (5/31/19) LVEF 21-25%, severely dilated LA, moderately dilated RA  ECHO (6/24/19) LVEF 16-20%, Severely dilated LV, trace MR, trace TR    EKG (6/12/19) atrial flutter with occasional PVCs, LBBB QRS 158ms  EKG (6/26/19) Atrial fibrillation with premature ventricular or aberrantly conducted complexes, Left bundle branch block, rate 86, , QTc 564     [facility] not in epic    Review of Systems:     General:  Denies fever, fatigue   HEENT: Denies angioedema, epistaxis  Pulmonary: Denies cough, wheeze, hemoptysis   Cardiac: Denies exertional chest pain, palpitations, syncope, near syncope, orthopnea, PND, bleeding, claudication, AICD firing   GI:  Denies  abdominal pain, + abdominal bloating, change in bowel habits, or black or bloody stools  Musculo: Denies myalgias, arthralgias, + BLE edema  Neuro: Denies headaches, CVA/TIA sx  Skin:  Denies rash  Heme: Denies bruising, bleeding, lymphadenopathy  Psych: Denies anxiety or depression    Physical Exam:     Visit Vitals  /70 (BP 1 Location: Left arm, BP Patient Position: Sitting)   Pulse 70   Temp 96.9 °F (36.1 °C) (Oral)   Resp 18   Ht 6' 2\" (1.88 m)   Wt 207 lb 9.6 oz (94.2 kg)   SpO2 99%   BMI 26.65 kg/m²         General:  AAOx3 cooperative, no acute distress. HEENT:  Atraumatic. Pink and moist.  Anicteric sclerae. Neck:   Supple, no adenopoathy. Lungs:  Clear but diminished posterior, No wheezing/rhonchi/rales. Heart:   Median sternotomy scar, sternectomy, AICD in left upper chest wall:  Irregular rhythm, S1, S2 present, no murmur, no rubs, S3 gallop. JVD 8 cm (-) HJR . No carotid bruits. Abdomen:  Soft, non-distended, non-tender. + Bowel sounds. No bruits. Extremities:  +1pitting BLE edema, no clubbing, no cyanosis. No calf tenderness  Neurologic:  Grossly intact. Alert and oriented X 3. No acute neurological distress. Skin:   intact, no wounds, scattered ecchymosis and bruising, no rashes   Psych:  Good insight. Not anxious nor agitated.       History:  Past Medical History:   Diagnosis Date    Degenerative disc disease, lumbar     Heart failure (HCC)     High cholesterol     Hypertension     Paroxysmal atrial fibrillation ([facility] [address] 4/2/2019    Spinal stenosis      Past Surgical History:   Procedure Laterality Date    HX APPENDECTOMY      HX CORONARY ARTERY BYPASS GRAFT      triple    HX HERNIA REPAIR      HX IMPLANTABLE CARDIOVERTER DEFIBRILLATOR      [place] CARDIOVERSION ELECTIVE ARRHYTHMIA EXTERNAL N/A 6/10/2019    EP CARDIOVERSION performed by Oscar Parmar MD at [facility] CATH [facility]    [place] CARDIOVERSION ELECTIVE ARRHYTHMIA EXTERNAL N/A 2019    EP CARDIOVERSION performed by Saman Montemayor MD at [address] [facility]    NV INSJ/RPLCMT PERM DFB W/TRNSVNS LDS 1/DUAL CHMBR N/A 2019    INSERT ICD BIV MULTI performed by Tere Schaefer MD at [address] [facility]     Social History     Socioeconomic History    Marital status:      Spouse name: Not on file    Number of children: Not on file    Years of education: Not on file    Highest education level: Not on file   Occupational History    Not on file   Social Needs    Financial resource strain: Not on file    Food insecurity:     Worry: Not on file     Inability: Not on file    Transportation needs:     Medical: Not on file     Non-medical: Not on file   Tobacco Use    Smoking status: Former Smoker     Last attempt to quit: 2010     Years since quittin.7    Smokeless tobacco: Never Used   Substance and Sexual Activity    Alcohol use: Not Currently     Comment: rarely    Drug use: Never    Sexual activity: Not on file   Lifestyle    Physical activity:     Days per week: Not on file     Minutes per session: Not on file    Stress: Not on file   Relationships    Social connections:     Talks on phone: Not on file     Gets together: Not on file     Attends Christianity service: Not on file     Active member of club or organization: Not on file     Attends meetings of clubs or organizations: Not on file     Relationship status: Not on file    Intimate partner violence:     Fear of current or ex partner: Not on file     Emotionally abused: Not on file     Physically abused: Not on file     Forced sexual activity: Not on file   Other Topics Concern   [address] Service Not Asked    Blood Transfusions Not Asked    Caffeine Concern Not Asked    Occupational Exposure Not Asked   Carie Mccullough Hazards Not Asked    Sleep Concern Not Asked    Stress Concern Not Asked    Weight Concern Not Asked    Special Diet Not Asked    Back Care Not Asked    Exercise Not Asked    Bike Helmet Not Asked   [address] [facility] Not Asked    Self-Exams Not Asked   Social History Narrative    Not on file     Family History   Problem Relation Age of Onset    Lung Disease Mother     Hypertension Mother     Arthritis-osteo Mother     Heart Disease Mother     Heart Disease Father     Diabetes Father        Current Medications:   Current Outpatient Medications   Medication Sig Dispense Refill    torsemide (DEMADEX) 20 mg tablet Take 3 Tabs by mouth two (2) times a day. (Patient taking differently: Take 60 mg by mouth two (2) times a day. Indications: takes 3 tabs in the am and 2 tabs in the pm) 360 Tab 3    tamsulosin (FLOMAX) 0.4 mg capsule Take 0.4 mg by mouth daily. 3    carvedilol (COREG) 6.25 mg tablet Take 1 Tab by mouth two (2) times daily (with meals). 180 Tab 3    amiodarone (CORDARONE) 200 mg tablet Take 1 Tab by mouth two (2) times a day. 180 Tab 0    milrinone (PRIMACOR) 20 mg/100 mL (200 mcg/mL) infusion 18.14 mcg/min by IntraVENous route continuous. 1 mL 0    aspirin delayed-release 81 mg tablet Take 81 mg by mouth daily.  apixaban (ELIQUIS) 5 mg tablet Take 5 mg by mouth two (2) times a day.  spironolactone (ALDACTONE) 25 mg tablet Take 25 mg by mouth daily. Allergies: No Known Allergies        Recent Labs:   No flowsheet data found.   Lab Results   Component Value Date/Time    WBC 6.7 06/26/2019 03:00 AM    HGB 11.4 (L) 06/26/2019 03:00 AM    HCT 34.7 (L) 06/26/2019 03:00 AM    PLATELET 434 99/97/7791 03:00 AM    MCV 91.3 06/26/2019 03:00 AM     Lab Results   Component Value Date/Time    GFR est non-AA 26 (L) 07/12/2019 12:00 AM    GFR est AA 30 (L) 07/12/2019 12:00 AM    Creatinine 2.47 (H) 07/12/2019 12:00 AM    BUN 40 (H) 07/12/2019 12:00 AM    Sodium 137 07/12/2019 12:00 AM    Potassium 4.3 07/12/2019 12:00 AM    Chloride 93 (L) 07/12/2019 12:00 AM    CO2 25 07/12/2019 12:00 AM    Magnesium 2.4 06/26/2019 03:00 AM Phosphorus 2.4 (L) 06/04/2019 04:09 AM     Lab Results   Component Value Date/Time    TSH 2.45 06/01/2019 04:16 AM      Lab Results   Component Value Date/Time    Sodium 137 07/12/2019 12:00 AM    Potassium 4.3 07/12/2019 12:00 AM    Chloride 93 (L) 07/12/2019 12:00 AM    CO2 25 07/12/2019 12:00 AM    Anion gap 8 06/26/2019 03:00 AM    Glucose 89 07/12/2019 12:00 AM    BUN 40 (H) 07/12/2019 12:00 AM    Creatinine 2.47 (H) 07/12/2019 12:00 AM    BUN/Creatinine ratio 16 07/12/2019 12:00 AM    GFR est AA 30 (L) 07/12/2019 12:00 AM    GFR est non-AA 26 (L) 07/12/2019 12:00 AM    Calcium 9.4 07/12/2019 12:00 AM    Bilirubin, total 0.5 06/26/2019 03:00 AM    ALT (SGPT) 14 06/26/2019 03:00 AM    AST (SGOT) 12 (L) 06/26/2019 03:00 AM    Alk. phosphatase 86 06/26/2019 03:00 AM    Protein, total 6.7 06/26/2019 03:00 AM    Albumin 3.0 (L) 06/26/2019 03:00 AM    Globulin 3.7 06/26/2019 03:00 AM    A-G Ratio 0.8 (L) 06/26/2019 03:00 AM      No results found for: HBA1C, AZK1LHSR, HGBE8, FIU1FYKQ, BYM6MXGN, PAI3TNZJ         Thank you for letting us see him with you,      Mounika Rdz MD, [facility], Oksana Dennis  Chief of Cardiology, [address] [facility] Director  [address] [facility]  [address] [facility], [address], [address] [place] [address]  Office [phone]  Fax [phone]

## 2019-09-12 NOTE — LETTER
9/12/19 Patient: Deniz Dill YOB: 1950 Date of Visit: 9/12/2019 MD BRICE Rideror 20 VIA Facsimile: [phone] Dear Chelsie Vergara MD, Thank you for referring Mr. Sona Kiser to [address] [place] [address] for evaluation. My notes for this consultation are attached. If you have questions, please do not hesitate to call me. I look forward to following your patient along with you. Sincerely, Micah Corral MD

## 2019-09-12 NOTE — PATIENT INSTRUCTIONS
Consider Cardiomems placement - Dr. Carolin Jamestingham will discuss wit Dr. Josr Hendrix  Sleep study scheduled for November 7, 2019 - Call Dr. Nitin Gage to put on wait list in case of cancellation  Will request CT scan records from Dr. Jayshree Stevens  Return to [facility] in 2 months

## 2019-09-12 NOTE — TELEPHONE ENCOUNTER
I sent a medical release signed by patient to Dr. Jonathan Bolton at [facility] for a copy of the CT scan

## 2019-09-17 NOTE — TELEPHONE ENCOUNTER
Reviewed with Dr. Mahendra Rdz who recommended V.O.R.B weight of 190lbs be used for milrinone dosing. Spoke with Wilmot Meckel at [facility] and notified of recommendations. She verbalized understanding and had no further questions at this time. Kurt Mccoy RN.

## 2019-09-17 NOTE — TELEPHONE ENCOUNTER
Call received from Matt Bryant with [employer] with weight update. Per Matt Bryant patient's weight last week on 9/9/19 was 199lb. Patient's reported weight yesterday was 190lb. Matt Bryant requested weights be reviewed by Dr. Juliet Brooks and recommendations be made whether to use previous weight of 199lb or current weight of 190lbs for milrinone dose. Informed Matt Bryant will review with Dr. Dung Olivares and contact [place] [phone] with further recommendations. Matt Bryant verbalized understanding and had no further questions. Khalida Baig RN.

## 2019-09-18 NOTE — Clinical Note
PA Catheter inserted. Pre-deployment angiogram performed. Vessel diameter is great than or equals 7mm.

## 2019-09-18 NOTE — Clinical Note
Sheath #1: sheath exchanged for INTRO [address] [facility] [address] [id number] -- [place].

## 2019-09-18 NOTE — DISCHARGE INSTRUCTIONS
[email or web address]    Patient Discharge Instructions    Rupal Rea / [id number] : 1950    Admitted 2019 Discharged: 2019       · It is important that you take the medication exactly as they are prescribed. · Keep your medication in the bottles provided by the pharmacist and keep a list of the medication names, dosages, and times to be taken in your wallet. · Do not take other medications without consulting your doctor. BRING ALL OF YOUR MEDICINES TO YOUR OFFICE VISIT with Dr. Pascale Cates with Dr. Ledy Wood in 2 week      Cardiac Catheterization  Discharge Instructions     Do not drive, operate any machinery, or sign any legal documents for 24 hours after your procedure. You must have someone to drive you home.  You may take a shower 24 hours after your cardiac catheterization. Be sure to get the dressing wet and then remove it; gently wash the area with warm soapy water. Pat dry and leave open to air. To help prevent infections, be sure to keep the cath site clean and dry. No lotions, creams, powders, ointments, etc. in the cath site for approximately 1 week.  Do not take a tub bath, get in a hot tub or swimming pool for approximately 5 days or until the cath site is completely healed.  No strenuous activity or heavy lifting over 10 lbs. for 7 days.  Drink plenty of fluids for 24-48 hours after your cath to flush the contrast dye from your kidneys. No alcoholic beverages for 24 hours. You may resume your previous diet (low fat, low cholesterol) after your cath.  After your cath, some bruising or discomfort is common during the healing process. Tylenol, 1-2 tablets every 6 hours as needed, is recommended if you experience any discomfort.   If you experience any signs or symptoms of infection such as fever, chills, or poorly healing incision, persistent tenderness or swelling in the groin, redness and/or warmth to the touch, numbness, significant tingling or pain at the groin site or affected extremity, rash, drainage from the cath site, or if the leg feels tight or swollen, call your physician right away.  If bleeding at the cath site occurs, take a clean gauze pad and apply direct pressure to the groin just above the puncture site. Call 911 immediately, and continue to apply direct pressure until an ambulance gets to your location.  You may return to work  2  days after your cardiac cath if no groin bleeding. Information obtained by :  I understand that if any problems occur once I am at home I am to contact my physician. I understand and acknowledge receipt of the instructions indicated above.                                                                                                                                            R.N.'s Signature                                                                  Date/Time                                                                                                                                              Patient or Representative Signature                                                          Date/Time      Shyanne Pruitt MD

## 2019-09-27 NOTE — TELEPHONE ENCOUNTER
Shantel Yañez is to be contacted by lead sleep technologist regarding results of Sleep Testing which was indicative of an average AHI of 19 per hour with an SpO2 nina of 84% . * A second polysomnogram has been ordered for mask fitting, PAP desensitizing protocol, and pressure titration. * Follow-up appointment will be scheduled 8-12 weeks following PAP initiation to gauge treatment response and compliance. Encounter Diagnosis   Name Primary?     JOSE (obstructive sleep apnea) Yes       Orders Placed This Encounter    SLEEP LAB (PAP TITRATION)     Standing Status:   Future     Standing Expiration Date:   3/27/2020     Scheduling Instructions:      Perform titration with 3 LPM Oxygen     Order Specific Question:   Reason for Exam     Answer:   JOSE

## 2019-09-27 NOTE — PROGRESS NOTES
This note is being routed to Molly Caputo NP. Sleep Medicine consult note and sleep study report in patient's chart for review.     Thank you for the referral.

## 2019-10-01 NOTE — TELEPHONE ENCOUNTER
Reviewed with Dr. David Can who recommended V.O.R.B patient increase torsemide to 80mg by mouth twice daily. Contacted patient to notify. He stated he notices the shortness of breath mostly when he lays down and is struggling to sleep. He did have CPAP titration and will be receiving a machine in 7-10 days. Patient advised of medication change recommendations. He was advised to monitor daily weights and blood pressures and restrict sodium intake. Patient advised to ensure adequate fluid intake. Patient encouraged to contact our office with any further questions or concerns. He verbalized understanding and had no further questions. Remberto Saba RN.

## 2019-10-01 NOTE — TELEPHONE ENCOUNTER
Patients wife, Eduardo Carrera called. Says patient isn't sleeping well and is having shortness of breath. Please call her.     [phone]

## 2019-10-02 NOTE — TELEPHONE ENCOUNTER
Patient called to speak to [facility]. Please return his call [phone].

## 2019-10-02 NOTE — TELEPHONE ENCOUNTER
Spoke with patient's wife, Marita Llanos, who reported patient's weight is down 4lbs overnight from 193lbs yesterday to 189lbs today. PAD readings on cardiomems also down to 21 today from 34 yesterday. She stated his shortness of breath has improved and patient did get some sleep last night. Marita Llanos stated patients blood pressure has been \"about 101/70's\" the past few days. Patient denies dizziness or shortness of breath. Marita Llanos reported he is drinking about 2-3 eight ounce glasses of water today. Kathy Perezdgenzo to set goal for 6 eight ounce glasses of water to ensure adequate hydration. Reviewed with Mike Lovell NP who recommended patient decrease torsemide back to 60mg by mouth twice daily tomorrow and continue to monitor. Per Mike Lovell patient to call [facility] if he drops another 5lbs or more. patient to contact [facility] if symptoms worsen, or if he develops dizziness/lightheadedness. Contacted patient to notify. He verbalized understanding and had no further questions at this time. Donte Singh RN.

## 2019-10-03 NOTE — TELEPHONE ENCOUNTER
Orders Placed This Encounter    AMB SUPPLY ORDER     Primary Encounter Diagnosis: Obstructive Sleep Apnea  (G47.33)    Respironics DreamStation with Heated Humidifer [id number] / [id number]. Positive Airway Pressure Therapy: Duration of need: 99 months. Set Pressure: 9 cmH2O + 3 LPM Supplemental Oxygen (patient currently on supplemental oxygen therapy).  Nasal Cushion (Replace) 2 per month.  Nasal Interface Mask 1 every 3 months.  Headgear 1 every 6 months.  Filter(s) Disposable 2 per month.  Filter(s) Non-Disposable 1 every 6 months. [facility] for [employer] (Replace) 1 every 6 months.  Tubing with heating element 1 every 3 months. Perform Mask Fitting per patient preference and comfort - replace as above. Nevaeh Finch MD, FAA; NPI: [id number]  Electronically signed.  10/03/19

## 2019-10-03 NOTE — TELEPHONE ENCOUNTER
Spoke with Zia Chakraborty who requested most recent office note be faxed along with milrinone orders faxed to our office. Orders were received. Ple[place] will have Dr. Elijah Barksdale sign orders and fax along with most recent office note. Zia Chakraborty verbalized understanding and had no further questions. Kushal Wolff RN.

## 2019-10-03 NOTE — TELEPHONE ENCOUNTER
Voicemail received from [facility] at [employer]. Please return her call at [phone].

## 2019-10-04 NOTE — TELEPHONE ENCOUNTER
Contacted patient for an update. He stated his weight today is 189lb with BP of 110/70. Patient stated he \"feels pretty well. \" Patient stated he still has some shortness of breath but it is \"much better. \" He also reported swelling in the ankles that is improved. Patient confirmed he has been taking torsemide 60mg BID. Advised patient will review with provider and contact him if any changes recommended. Patient encouraged to continue monitoring daily weights and blood pressures. Patient advised to contact [facility] with any weight gain of 2 or more pounds overnight or 5lbs in the course of a week, or if he develops worsening shortness of breath, swelling, or dizziness/lightheadedness. Patient encouraged to continue restricting sodium intake and ensuring adequate fluid intake. Patient verbalized understanding and had no further questions. Reviewed with Abraham Mtz NP who recommended no changes at this time. Hal Trujillo RN.

## 2019-10-14 NOTE — TELEPHONE ENCOUNTER
I called patient this morning to see how he was feeling. Patient stated he was feeling much better. Patient stated his weight was 189 on Saturday and was 187 today. His blood pressure is 108/74 today. Patient stated he was very thirsty through the night drinking two 8 ounce glasses of water and drinking six 8 ounce glasses of water during they day. I spoke with Sasha Quach NP and she wants patient to take 80 mg of torsemide in the AM and 60 mg of torsemide in the evening as she feels he is getting too dry and is why he is thirsty at night. She suggests he use mints or ice instead of water as the torsemide is used for dieresis. I called patient and explained he needs to take torsemide 80 mg in AM and 60 mg in PM.  Patient expressed understanding.

## 2019-10-24 NOTE — TELEPHONE ENCOUNTER
Lab work reviewed by Dr. Cheyenne Rubi. Per Dr. Cheyenne KABAB patient to increase torsemide to 80mg by mouth twice daily due to increased NT proBNP noted on lab work. Attempted to contact patient. Message left. Will await return call. Mike Pimentel RN.

## 2019-10-24 NOTE — TELEPHONE ENCOUNTER
Patient returned call. Spoke with his wife Isaias Rutherford per his request. Per Isaias Rutherford patient has not been feeling well. He has been noticing increased shortness of breath and nausea the past few days which is worse today. Vital signs at home as follows:    10/21/19: BP: 102/78 Wt 186lb  10/22/19: BP: 92//74 Wt: 187lb  10/23/19: BP: 97/73 Wt: 188lb  10/24/19: BP: 92/74 Wt: 187lb    She stated his \"ankles look awful\" and he does have swelling. He has been drinking mostly soft drinks but does drink one to two glasses of water daily. His urine is generally dark in color. Isaias Rutherford confirmed patient is taking torsemide 60mg by mouth twice daily as ordered. Reviewed with Dr. Elijah Barksdale who recommended V.O.R.B patient be scheduled for appointment with her in clinic tomorrow 10/25/19 at 1:00pm for evaluation. Sandra Garcia to notify. She verbalized understanding and had no further questions. Kushal Wolff RN.

## 2019-10-25 PROBLEM — I50.9 ACUTE DECOMPENSATED HEART FAILURE (HCC): Status: ACTIVE | Noted: 2019-01-01

## 2019-10-25 NOTE — CONSULTS
[facility] 
 [address] [facility], [address] [facility] Phone: [phone] NOTE Patient: Natalie Lofton MRN: [id number]  PCP: Fabian Earl MD  
:     1950  Age:   71 y.o. Sex:  male Referring physician: Nisha Payton MD 
Reason for consultation: 71 y.o. male with Acute decompensated heart failure ([address] [I50.9]  And CKD 4 Admission Date: 10/25/2019  2:14 PM  LOS: 0 days ASSESSMENT and PLAN :  
CKD Stage IV : 
- Creatinine close to baseline (2-2.2) but he is very volume overloaded  
- he has element of CRS and post renal problems affecting renal function  
- agree w/ Bumex infusion and inotropes  
- labs Q12 hrly Left renal Atrophy : 
- Ordered Nuclear renal perfusion scan to assess flow and function BPH w/ urinary retention - Improved PVR after starting Flomax  
- trial of Higher dose of Flomax - place neal if PVR > 200 -- d/w CCU nurse Acute on Chronic HFrEF  
- EF 16-20%, NYHA Class IV , hx of VF arrest - s/p AICD 
- On Bumex gtt, Milrinone gtt, coreg and aldactone  
- being w/u for LVAD Recurrent UTI  
- check UA and cx JOSE on CPAP  
 
PAF s/p DCCV  
- on Eliquis and amiodarone Worsening Anemia  
- need further w/u Care Plan discussed with:  Dr Sarbjit Regalado, Pt , wife and his son Thank you for consulting [facility] in the care of your patient. Subjective: HPI: Natalie Lofton is a 71 y.o.  male who has been admitted to the hospital for decompensated CHF I had last seen him in September when he was stable on Home milrinone He reports worsening DOUGLAS for last 3 weeks and has developed significant peripheral edema His baseline Cr was 2.2 on his last visit With regards to his urology issues He has been seeing Dr Rudy Hardwick since - July for recurrent UTI and [facility] He has received one course of Cephalexin so far and had negative cx His PVR was > 550 prior to starting Flomax but improved to 190 since He had CT A/P that showed left renal atrophy but not completely atrophic. There was no stones / [employer] He has been having significant nocturia with reduced urinary volume for last 3 weeks No fevers/ chills/ rigors No Nephrotoxin exposure He was on demadex 60 mg BID and aldactone PTA Past Medical Hx:  
Past Medical History:  
Diagnosis Date  Degenerative disc disease, lumbar  Heart failure [address]  High cholesterol  Hypertension  Paroxysmal atrial fibrillation ([facility] 4/2/2019  Spinal stenosis Past Surgical Hx: 
  
Past Surgical History:  
Procedure Laterality Date  HX APPENDECTOMY  HX CORONARY ARTERY BYPASS GRAFT    
 triple  HX HERNIA REPAIR    
 HX IMPLANTABLE CARDIOVERTER DEFIBRILLATOR  NH CARDIOVERSION ELECTIVE ARRHYTHMIA EXTERNAL N/A 6/10/2019 EP CARDIOVERSION performed by Magen Cash MD at [place] [address], [address] CATH LAB  NH CARDIOVERSION ELECTIVE ARRHYTHMIA EXTERNAL N/A 6/18/2019 EP CARDIOVERSION performed by Aminta Stout MD at [place] [address], [address] CATH LAB  NH INSJ/RPLCMT PERM DFB W/TRNSVNS LDS 1/DUAL CHMBR N/A 6/21/2019 INSERT ICD BIV MULTI performed by Manuela Trinidad MD at [address] [facility] [address] CATH LAB  NH TCAT IMPL WRLS P-ART PRS SNR L-T HEMODYN MNTR N/A 9/18/2019 IMPLANT HEART FAILURE MONITORING DEVICE performed by Ivan Motley MD at [place] [address], [address] CATH LAB No Known Allergies Social Hx:  reports that he quit smoking about 8 years ago. He has never used smokeless tobacco. He reports that he drank alcohol. He reports that he does not use drugs. Family History Problem Relation Age of Onset  Lung Disease Mother  Hypertension Mother [facility] Arthritis-osteo Mother  Heart Disease Mother  Heart Disease Father  Diabetes Father Review of Systems: A thorough twelve point review of system was performed today.  Pertinent positives and negatives are mentioned in the HPI. The reminder of the ROS is negative and noncontributory. Objective:   
Vitals:   
Vitals:  
 10/25/19 1425 10/25/19 1607 BP: (!) 81/62 93/67 Pulse: 71 70 Resp: 18 18 Temp: 98 °F (36.7 °C) 97.9 °F (36.6 °C) SpO2: 100% 100% I&O's:  No intake/output data recorded. Visit Vitals BP 93/67 (BP 1 Location: Left arm, BP Patient Position: At rest) Pulse 70 Temp 97.9 °F (36.6 °C) Resp 18 SpO2 100% Physical Exam: 
General:  No apparent Distress HEENT: PERRL,  + Pallor , No Icterus Neck: Supple,no mass palpable Lungs : diminished CVS: RRR, S1 S2 normal, No murmur Abdomen: Soft, NT, BS + Extremities: 3+ Edema Skin: No rash or lesions. MS: No joint swelling, erythema, warmth Neurologic: non focal, AAO x 3 Psych:  Pleasant Laboratory Results: 
 
Recent Labs 10/25/19 
1456 * K 3.4*  
CL 90* CO2 29 * BUN 38* CREA 2.18* CA 8.6 ALB 2.8* SGOT 109* * INR 1.6* Recent Labs 10/25/19 
1456 WBC 3.9* HGB 10.1* HCT 31.5*  No results found for: SDES Lab Results Component Value Date/Time Culture result: ENTEROBACTER CLOACAE (A) 06/26/2019 12:25 PM  
 Culture result: NO GROWTH 1 DAY 06/13/2019 10:44 AM  
 Culture result: ENTEROBACTER CLOACAE (A) 06/04/2019 04:27 AM  
 
Recent Results (from the past 24 hour(s)) PROCALCITONIN Collection Time: 10/25/19  2:56 PM  
Result Value Ref Range Procalcitonin 0.2 ng/mL METABOLIC PANEL, COMPREHENSIVE Collection Time: 10/25/19  2:56 PM  
Result Value Ref Range Sodium 128 (L) 136 - 145 mmol/L Potassium 3.4 (L) 3.5 - 5.1 mmol/L Chloride 90 (L) 97 - 108 mmol/L  
 CO2 29 21 - 32 mmol/L Anion gap 9 5 - 15 mmol/L Glucose 110 (H) 65 - 100 mg/dL BUN 38 (H) 6 - 20 MG/DL Creatinine 2.18 (H) 0.70 - 1.30 MG/DL  
 BUN/Creatinine ratio 17 12 - 20 GFR est AA 37 (L) >60 ml/min/1.73m2 GFR est non-AA 30 (L) >60 ml/min/1.73m2 Calcium 8.6 8.5 - 10.1 MG/DL Bilirubin, total 1.6 (H) 0.2 - 1.0 MG/DL  
 ALT (SGPT) 156 (H) 12 - 78 U/L  
 AST (SGOT) 109 (H) 15 - 37 U/L Alk. phosphatase 153 (H) 45 - 117 U/L Protein, total 5.9 (L) 6.4 - 8.2 g/dL Albumin 2.8 (L) 3.5 - 5.0 g/dL Globulin 3.1 2.0 - 4.0 g/dL A-G Ratio 0.9 (L) 1.1 - 2.2    
CBC W/O DIFF Collection Time: 10/25/19  2:56 PM  
Result Value Ref Range WBC 3.9 (L) 4.1 - 11.1 K/uL  
 RBC 3.47 (L) 4.10 - 5.70 M/uL  
 HGB 10.1 (L) 12.1 - 17.0 g/dL HCT 31.5 (L) 36.6 - 50.3 % MCV 90.8 80.0 - 99.0 FL  
 MCH 29.1 26.0 - 34.0 PG  
 MCHC 32.1 30.0 - 36.5 g/dL  
 RDW 16.2 (H) 11.5 - 14.5 % PLATELET 070 767 - 702 K/uL MPV 10.5 8.9 - 12.9 FL  
 NRBC 0.0 0  WBC ABSOLUTE NRBC 0.00 0.00 - 0.01 K/uL HEMOGLOBIN A1C WITH EAG Collection Time: 10/25/19  2:56 PM  
Result Value Ref Range Hemoglobin A1c 6.6 (H) 4.2 - 6.3 % Est. average glucose 143 mg/dL CK Collection Time: 10/25/19  2:56 PM  
Result Value Ref Range CK 51 39 - 308 U/L  
TROPONIN I Collection Time: 10/25/19  2:56 PM  
Result Value Ref Range Troponin-I, Qt. <0.05 <0.05 ng/mL PROTHROMBIN TIME + INR Collection Time: 10/25/19  2:56 PM  
Result Value Ref Range INR 1.6 (H) 0.9 - 1.1 Prothrombin time 15.8 (H) 9.0 - 11.1 sec VON WILLEBRAND PANEL Collection Time: 10/25/19  2:56 PM  
Result Value Ref Range Factor VIII Activity PENDING %  
 von Willebrand Factor (vWF) Ag PENDING %  
 vWF Activity PENDING % TYPE & SCREEN Collection Time: 10/25/19  2:56 PM  
Result Value Ref Range Crossmatch Expiration 10/28/2019 ABO/Rh(D) O POSITIVE Antibody screen NEG   
NT-PRO BNP Collection Time: 10/25/19  2:56 PM  
Result Value Ref Range NT pro-BNP 12,272 (H) <125 PG/ML  
CRP, HIGH SENSITIVITY Collection Time: 10/25/19  2:56 PM  
Result Value Ref Range CRP, High sensitivity >9.5 mg/L PREALBUMIN  
 Collection Time: 10/25/19  2:56 PM  
Result Value Ref Range Prealbumin 13.8 (L) 20.0 - 40.0 mg/dL LD Collection Time: 10/25/19  2:56 PM  
Result Value Ref Range  (H) 85 - 241 U/L  
PSA SCREENING (SCREENING) Collection Time: 10/25/19  2:56 PM  
Result Value Ref Range Prostate Specific Ag 0.3 0.01 - 4.0 ng/mL T3, FREE Collection Time: 10/25/19  2:56 PM  
Result Value Ref Range Free Triiodothyronine (T3) 1.3 (L) 2.2 - 4.0 pg/mL URIC ACID Collection Time: 10/25/19  2:56 PM  
Result Value Ref Range Uric acid 10.6 (H) 3.5 - 7.2 MG/DL  
IRON PROFILE Collection Time: 10/25/19  2:56 PM  
Result Value Ref Range Iron 28 (L) 35 - 150 ug/dL TIBC 324 250 - 450 ug/dL Iron % saturation 9 (L) 20 - 50 % FERRITIN Collection Time: 10/25/19  2:56 PM  
Result Value Ref Range Ferritin 100 26 - 388 NG/ML  
POC G3 - PUL Collection Time: 10/25/19  4:12 PM  
Result Value Ref Range pH (POC) 7.518 (H) 7.35 - 7.45    
 pCO2 (POC) 32.5 (L) 35.0 - 45.0 MMHG  
 pO2 (POC) 70 (L) 80 - 100 MMHG  
 HCO3 (POC) 26.4 (H) 22 - 26 MMOL/L  
 sO2 (POC) 96 92 - 97 % Base excess (POC) 4 mmol/L Site LEFT RADIAL Device: ROOM AIR Allens test (POC) YES Specimen type (POC) ARTERIAL Total resp. rate 18 URINE CULTURE HOLD SAMPLE Collection Time: 10/25/19  5:30 PM  
Result Value Ref Range Urine culture hold URINE ON HOLD IN MICROBIOLOGY DEPT FOR 3 DAYS. IF UNPRESERVED URINE IS SUBMITTED, IT CANNOT BE USED FOR ADDITIONAL TESTING AFTER 24 HRS, RECOLLECTION WILL BE REQUIRED. Urine dipstick:  
Lab Results Component Value Date/Time  Color YELLOW/STRAW 06/20/2019 05:17 PM  
 Appearance CLEAR 06/20/2019 05:17 PM  
 Specific gravity 1.009 06/20/2019 05:17 PM  
 pH (UA) 7.0 06/20/2019 05:17 PM  
 Protein NEGATIVE  06/20/2019 05:17 PM  
 Glucose NEGATIVE  06/20/2019 05:17 PM  
 Ketone NEGATIVE  06/20/2019 05:17 PM  
 Bilirubin NEGATIVE  06/20/2019 05:17 PM  
 Urobilinogen 1.0 06/20/2019 05:17 PM  
 Nitrites NEGATIVE  06/20/2019 05:17 PM  
 Leukocyte Esterase NEGATIVE  06/20/2019 05:17 PM  
 Epithelial cells FEW 06/20/2019 05:17 PM  
 Bacteria NEGATIVE  06/20/2019 05:17 PM  
 WBC 0-4 06/20/2019 05:17 PM  
 RBC 10-20 06/20/2019 05:17 PM  
 
 
I have reviewed the following: All pertinent labs, microbiology data, radiology imaging for my assessment Medications list Personally Reviewed   [x]      Yes     []               No    
 
Medications: 
Prior to Admission medications Medication Sig Start Date End Date Taking? Authorizing Provider  
escitalopram oxalate (LEXAPRO) 5 mg tablet TAKE 1 TABLET BY MOUTH EVERY DAY 10/10/19   Provider, Historical  
torsemide (DEMADEX) 20 mg tablet Take 3 Tabs by mouth two (2) times a day. 10/2/19   Fred Titus MD  
tamsulosin (FLOMAX) 0.4 mg capsule Take 0.4 mg by mouth daily. 7/18/19   Provider, Historical  
carvedilol (COREG) 6.25 mg tablet Take 1 Tab by mouth two (2) times daily (with meals). 7/31/19   Fred Titus MD  
amiodarone (CORDARONE) 200 mg tablet Take 1 Tab by mouth two (2) times a day. 7/31/19   Fred Titus MD  
milrinone (PRIMACOR) 20 mg/100 mL (200 mcg/mL) infusion 18.14 mcg/min by IntraVENous route continuous. 6/26/19   Anthony Ann MD  
aspirin delayed-release 81 mg tablet Take 81 mg by mouth daily. Provider, Historical  
apixaban (ELIQUIS) 5 mg tablet Take 5 mg by mouth two (2) times a day. Provider, Historical  
spironolactone (ALDACTONE) 25 mg tablet Take 25 mg by mouth daily. Provider, Historical  
  
 
Thank you for allowing us to participate in the care of this patient. We will follow patient. Please dont hesitate to call with any questions Den Walter, Keeley S [facility] [address] [address], [address] [facility] Phone - [phone] Fax - [phone] 
[email or web address]. [email or web address]

## 2019-10-25 NOTE — LETTER
10/25/2019 2:53 PM 
 
Patient:  Tobin Bella YOB: 1950 Date of Visit: 10/25/2019 Dear Quinn Gary MD 
[address] [facility] [address] [facility] [address] [address] VIA In Basket Benita Cornell MD 
[facility] [address] [facility] [address] VIA Facsimile: [phone] Regina Casillas MD 
[facility] [address] [facility] [address] VIA Facsimile: [phone] 
 : Thank you for referring Mr. Sona Kiser to me for evaluation/treatment. Below are the relevant portions of my assessment and plan of care. If you have questions, please do not hesitate to call me. I look forward to following Mr. Kelsy Burgos along with you. Sincerely, Chad Morales MD

## 2019-10-25 NOTE — PROGRESS NOTES
Leeann Note DOS:  10/25/2019 REFERRING PROVIDER:  Patricia Solano MD 
PRIMARY CARE PHYSICIAN: Almaz Ramachandran MD 
PRIMARY CARDIOLOGIST: Patricia Solano MD 
 
 
 
Chief Complaint Patient presents with  Follow-up  Shortness of Breath  
  worsening over the past few days  Fatigue  
  worsening over the past few days. HPI: Marina Michael is a 71y.o. year old pleasant white male with a history of HTN, HLD, JOSE, CAD s/p cardiac arrest VFib s/p CABG (2011) c/b sternal would infection and sternectomy, ischemic cardiomyopathy LVEF 15-20%, s/p ICD and with LBBB. He was admitted with acute on chronic systolic heart failure with massive volume overload > 20 lbs, in the setting of atrial fibrillation s/p failed DCCV and underwent DCCV and RHC on 6/18. S/p BiVICD on 6/21/19 with Dr. Juancarlos Wong. He was discharged home home on IV milrinone on 6/26/19. He has been followed closely by Dr. Mitra Aceves and the [facility]. Mr. Javier Sosa returns to clinic for follow up with his wife. His dyspnea has progressed to the point he is sitting in a chair most of the day. He is unable to perform simple ADLs without limiting dyspnea. His appetite is down. He underwent a sleep study and is using CPAP but has difficulty due to frequent nocturia. He denies presyncope or syncope. He has had a few falls due to generalized weakness but did not hit his head nor lose consciousness. Recent labs reveal stable creatinine from 1.9 - 2.1. He is currently taking torsemide 60 mg bid. His wife has noticed an odor to his urine. He denies fever/chills but admits to feeling cold all the time. IMPRESSION/PLAN: 
1. Acute on chronic systolic heart failure 
 ICM, Stage D, NYHA Class IV, LVEF 16-20%, s/p CRT on 6/21/19 CRT non-responder Intolerant to RAASi d/t renal dysfunction Reduce carvedilol to 3.125 mg BID d/t RV failure CardioMems with PAD 20-25 mmHg, 23 mmHg today Reduce IV milrinone to 0.375 mcgs/kg/min Admit to [facility] for  IV bumex infusion 1 mg/hour Continue spironolactone Follow renal function and electrolytes closely Start LVAD evaluation LVAD education Transfer to the ICU for PA cath when bed available 2. History of VF arrest - s/p AICD No recent shocks 3. CAD s/p CABG in 2010 Recommend [facility] 4. PAF s/p DCCV 6/18/19 Hold eliquis Start IV heparin for CVA prophylaxis Continue amiodarone 5. Chronic Kidney Disease 3 - single kidney Followed closely by Dr. Srinivasan Ser Consult nephrology 6. History of Urinary Retention UA and urine culture Bladder scan 7. JOSE on CPAP Encourage use of home CPAP 
 
8. History of Sternal wound infection requiring sternectomy Stable CV surgery aware - not a contraindication for LVAD 9. Anemia, iron deficiency Check iron profile 10. H/o DVT IV heparin 11. Migraines 12. H/o tobacco abuse Counseling - continued abstinence 13. Depression On lexapro - consider increasing his dose 14. Anorexia - likely multifactorial (depression, congestive hepatopathy) Check prealbumin Nutritional supplements Start [facility] CARDIAC EVALUATION 
ECHO (5/31/19) LVEF 21-25%, severely dilated LA, moderately dilated RA 
ECHO (6/24/19) LVEF 16-20%, Severely dilated LV, trace MR, trace TR 
 
EKG (6/12/19) atrial flutter with occasional PVCs, LBBB QRS 158ms EKG (6/26/19) Atrial fibrillation with premature ventricular or aberrantly conducted complexes, Left bundle branch block, rate 86, , QTc 564 [facility] not in epic Review of Systems:    
General:  Denies fever, fatigue HEENT: Denies angioedema, epistaxis; complains of hoarseness Pulmonary: Denies cough, wheeze, hemoptysis Cardiac: Admits to DOUGLAS, edema, orthopnea,  but denies exertional chest pain, palpitations, syncope, near syncope, bleeding, claudication, AICD firing GI:  Admits to abdominal bloating, anorexia but denies change in bowel habits, or black or bloody stools :  Admits to nocturia Musculo: Denies myalgias, arthralgias, + BLE edema Neuro: Denies headaches, CVA/TIA sx Skin:  Denies rash Heme: Denies bruising, bleeding, lymphadenopathy Psych: Admits to depression Physical Exam:  
 
Visit Vitals BP 94/62 Pulse 70 Temp 97.8 °F (36.6 °C) (Oral) Resp 18 Wt 193 lb 6.4 oz (87.7 kg) SpO2 97% BMI 24.83 kg/m² General:  AAOx3 cooperative, mild distress. HEENT:  Atraumatic. Pink and moist.  Anicteric sclerae. Neck:   Supple, no adenopoathy, elevated JVP, +HJR Lungs:  Diminished bilateral breath sounds at the bases, No wheezing/rhonchi/rales. Heart:   Median sternotomy scar, sternectomy, AICD in left upper chest wall: Regular rhythm, S1, S2 present, 2/6 murmur, no rubs, S3 gallop. No carotid bruits. Abdomen:  Distended, non-tender. + Bowel sounds. No bruits. Extremities:  +3 bilateral pitting LE edema, no clubbing, no cyanosis. No calf tenderness Neurologic:  Grossly intact. Alert and oriented X 3. No acute neurological distress. Skin:   intact, no wounds, scattered ecchymosis and bruising, no rashes Psych:  Good insight. Depressed History: 
Past Medical History:  
Diagnosis Date  Degenerative disc disease, lumbar  Heart failure ([address]  High cholesterol  Hypertension  Paroxysmal atrial fibrillation ([address] 4/2/2019  Spinal stenosis Past Surgical History:  
Procedure Laterality Date  HX APPENDECTOMY  HX CORONARY ARTERY BYPASS GRAFT    
 triple  HX HERNIA REPAIR    
 HX IMPLANTABLE CARDIOVERTER DEFIBRILLATOR  CT CARDIOVERSION ELECTIVE ARRHYTHMIA EXTERNAL N/A 6/10/2019 EP CARDIOVERSION performed by Pauly Chu MD at [address] [facility] CATH LAB  CT CARDIOVERSION ELECTIVE ARRHYTHMIA EXTERNAL N/A 6/18/2019  EP CARDIOVERSION performed by Candie Alvarado MD at [address] CATH LAB  
  VT INSJ/RPLCMT PERM DFB W/TRNSVNS LDS 1/DUAL CHMBR N/A 2019 INSERT ICD BIV MULTI performed by Radha Cobb MD at [address] CATH LAB  VT TCAT IMPL WRLS P-ART PRS SNR L-T HEMODYN MNTR N/A 2019 IMPLANT HEART FAILURE MONITORING DEVICE performed by Mariia Moss MD at [address]  CATH LAB Social History Socioeconomic History  Marital status:  Spouse name: Not on file  Number of children: Not on file  Years of education: Not on file  Highest education level: Not on file Occupational History  Not on file Social Needs  Financial resource strain: Not on file  Food insecurity:  
  Worry: Not on file Inability: Not on file  Transportation needs:  
  Medical: Not on file Non-medical: Not on file Tobacco Use  Smoking status: Former Smoker Last attempt to quit: 2010 Years since quittin.9  Smokeless tobacco: Never Used Substance and Sexual Activity  Alcohol use: Not Currently Comment: rarely  Drug use: Never  Sexual activity: Not on file Lifestyle  Physical activity:  
  Days per week: Not on file Minutes per session: Not on file  Stress: Not on file Relationships  Social connections:  
  Talks on phone: Not on file Gets together: Not on file Attends Yarsani service: Not on file Active member of club or organization: Not on file Attends meetings of clubs or organizations: Not on file Relationship status: Not on file  Intimate partner violence:  
  Fear of current or ex partner: Not on file Emotionally abused: Not on file Physically abused: Not on file Forced sexual activity: Not on file Other Topics Concern [address] Service Not Asked  Blood Transfusions Not Asked  Caffeine Concern Not Asked  Occupational Exposure Not Asked D[employer] Hazards Not Asked  Sleep Concern Not Asked  Stress Concern Not Asked  Weight Concern Not Asked  Special Diet Not Asked  Back Care Not Asked  Exercise Not Asked  Bike Helmet Not Asked  Seat Belt Not Asked  Self-Exams Not Asked Social History Narrative  Not on file Family History Problem Relation Age of Onset  Lung Disease Mother  Hypertension Mother [facility] Arthritis-osteo Mother  Heart Disease Mother  Heart Disease Father  Diabetes Father Current Medications:  
Current Outpatient Medications Medication Sig Dispense Refill  escitalopram oxalate (LEXAPRO) 5 mg tablet TAKE 1 TABLET BY MOUTH EVERY DAY  12  
 torsemide (DEMADEX) 20 mg tablet Take 3 Tabs by mouth two (2) times a day. 360 Tab 3  
 tamsulosin (FLOMAX) 0.4 mg capsule Take 0.4 mg by mouth daily. 3  
 carvedilol (COREG) 6.25 mg tablet Take 1 Tab by mouth two (2) times daily (with meals). 180 Tab 3  
 amiodarone (CORDARONE) 200 mg tablet Take 1 Tab by mouth two (2) times a day. 180 Tab 0  
 milrinone (PRIMACOR) 20 mg/100 mL (200 mcg/mL) infusion 18.14 mcg/min by IntraVENous route continuous. 1 mL 0  
 aspirin delayed-release 81 mg tablet Take 81 mg by mouth daily.  apixaban (ELIQUIS) 5 mg tablet Take 5 mg by mouth two (2) times a day.  spironolactone (ALDACTONE) 25 mg tablet Take 25 mg by mouth daily. Allergies: No Known Allergies Recent Labs:  
No flowsheet data found. Lab Results Component Value Date/Time WBC 6.7 06/26/2019 03:00 AM  
 HGB 11.4 (L) 06/26/2019 03:00 AM  
 HCT 34.7 (L) 06/26/2019 03:00 AM  
 PLATELET 914 19/87/5869 03:00 AM  
 MCV 91.3 06/26/2019 03:00 AM  
 
Lab Results Component Value Date/Time  GFR est non-AA 26 (L) 07/12/2019 12:00 AM  
 GFR est AA 30 (L) 07/12/2019 12:00 AM  
 Creatinine 2.47 (H) 07/12/2019 12:00 AM  
 BUN 40 (H) 07/12/2019 12:00 AM  
 Sodium 137 07/12/2019 12:00 AM  
 Potassium 4.3 07/12/2019 12:00 AM  
 Chloride 93 (L) 07/12/2019 12:00 AM  
 CO2 25 07/12/2019 12:00 AM  
 Magnesium 2.4 06/26/2019 03:00 AM  
 Phosphorus 2.4 (L) 06/04/2019 04:09 AM  
 
Lab Results Component Value Date/Time TSH 2.45 06/01/2019 04:16 AM  
  
Lab Results Component Value Date/Time Sodium 137 07/12/2019 12:00 AM  
 Potassium 4.3 07/12/2019 12:00 AM  
 Chloride 93 (L) 07/12/2019 12:00 AM  
 CO2 25 07/12/2019 12:00 AM  
 Anion gap 8 06/26/2019 03:00 AM  
 Glucose 89 07/12/2019 12:00 AM  
 BUN 40 (H) 07/12/2019 12:00 AM  
 Creatinine 2.47 (H) 07/12/2019 12:00 AM  
 BUN/Creatinine ratio 16 07/12/2019 12:00 AM  
 GFR est AA 30 (L) 07/12/2019 12:00 AM  
 GFR est non-AA 26 (L) 07/12/2019 12:00 AM  
 Calcium 9.4 07/12/2019 12:00 AM  
 Bilirubin, total 0.5 06/26/2019 03:00 AM  
 ALT (SGPT) 14 06/26/2019 03:00 AM  
 AST (SGOT) 12 (L) 06/26/2019 03:00 AM  
 Alk. phosphatase 86 06/26/2019 03:00 AM  
 Protein, total 6.7 06/26/2019 03:00 AM  
 Albumin 3.0 (L) 06/26/2019 03:00 AM  
 Globulin 3.7 06/26/2019 03:00 AM  
 A-G Ratio 0.8 (L) 06/26/2019 03:00 AM  
  
No results found for: HBA1C, GXC8ECVK, HGBE8, VAL8YRYV, IBX1GVGN, [id number] Thank you for letting us see him with you, Mounika Nicole MD, Rina Gonzalez Chief of Cardiology, [facility] Medical Director [address], [address] [place] [address] Office [phone] Fax [phone]

## 2019-10-25 NOTE — PROGRESS NOTES
1930 Bedside shift change report given to Nick Greco (oncoming nurse) by Arlene Huffman (offgoing nurse). Report included the following information SBAR, Procedure Summary, Intake/Output, MAR, Recent Results and Cardiac Rhythm Paced. 0522 Potassium level 3.1 and Sodium level 124 on AM labs. Dr. Marx Stage notified. Orders received for 10 meq Potassium Chloride now, followed by 10 meq Potassium Chloride in 4 hours 0730 Bedside shift change report given to Arlene Huffman (oncoming nurse) by Nick Greco (offgoing nurse). Report included the following information SBAR, Intake/Output, MAR, Recent Results and Cardiac Rhythm Paced.

## 2019-10-25 NOTE — H&P
[facility] H&P Note DOS:  10/25/2019 REFERRING PROVIDER:  Lavinia Keene MD 
PRIMARY CARE PHYSICIAN: Conrado Story MD 
PRIMARY CARDIOLOGIST: Lavinia Keene MD 
 
 
Chief Complaint: 
Dyspnea, edema, fatigue IMPRESSION/PLAN: 
1. Acute on chronic systolic heart failure 
 ICM, Stage D, NYHA Class IV, LVEF 16-20%, s/p CRT on 6/21/19 CRT non-responder Intolerant to RAASi d/t renal dysfunction Reduce carvedilol to 3.125 mg BID d/t RV failure CardioMems with PAD 20-25 mmHg, 23 mmHg today Reduce IV milrinone to 0.375 mcgs/kg/min Admit to [facility] for  IV bumex infusion 1 mg/hour Continue spironolactone Follow renal function and electrolytes closely Start LVAD evaluation LVAD education Transfer to the ICU for PA cath when bed available 2. History of VF arrest - s/p AICD No recent shocks 3. CAD s/p CABG in 2010 Recommend [facility] 4. PAF s/p DCCV 6/18/19 Hold eliquis Start IV heparin for CVA prophylaxis Continue amiodarone 5. Chronic Kidney Disease 3 - single kidney Followed closely by Dr. Heydi Hercules Consult nephrology 6. History of Urinary Retention UA and urine culture Bladder scan 7. JOSE on CPAP Encourage use of home CPAP 
 
8. History of Sternal wound infection requiring sternectomy Stable CV surgery aware - not a contraindication for LVAD 9. Anemia, iron deficiency Check iron profile 10. H/o DVT IV heparin 11. Migraines 12. H/o tobacco abuse Counseling - continued abstinence 13. Depression On lexapro - consider increasing his dose 14. Anorexia - likely multifactorial (depression, congestive hepatopathy) Check prealbumin Nutritional supplements Start [employer] 15. Sepsis Concern Urine culture Paired blood cultures Check procalcitonin, lactic acid HPI: Reid Baumann is a 71y.o. year old pleasant white male with a history of HTN, HLD, JOSE, CAD s/p cardiac arrest VFib s/p CABG (2011) c/b sternal would infection and sternectomy, ischemic cardiomyopathy LVEF 15-20%, s/p ICD and with LBBB. He was admitted with acute on chronic systolic heart failure with massive volume overload > 20 lbs, in the setting of atrial fibrillation s/p failed DCCV and underwent DCCV and RHC on 6/18. S/p BiVICD on 6/21/19 with Dr. Odilon Rogers. He was discharged home home on IV milrinone on 6/26/19. He has been followed closely by Dr. Sofie Keenan and the [facility]. Mr. Tenzin Savage returns to clinic for follow up with his wife. His dyspnea has progressed to the point he is sitting in a chair most of the day. He is unable to perform simple ADLs without limiting dyspnea. His appetite is down. He underwent a sleep study and is using CPAP but has difficulty due to frequent nocturia. He denies presyncope or syncope. He has had a few falls due to generalized weakness but did not hit his head nor lose consciousness. Recent labs reveal stable creatinine from 1.9 - 2.1. He is currently taking torsemide 60 mg bid. His wife has noticed an odor to his urine. He denies fever/chills but admits to feeling cold all the time. CARDIAC EVALUATION 
ECHO (5/31/19) LVEF 21-25%, severely dilated LA, moderately dilated RA 
ECHO (6/24/19) LVEF 16-20%, Severely dilated LV, trace MR, trace TR 
 
EKG (6/12/19) atrial flutter with occasional PVCs, LBBB QRS 158ms EKG (6/26/19) Atrial fibrillation with premature ventricular or aberrantly conducted complexes, Left bundle branch block, rate 86, , QTc [facility] not in epic Review of Systems:    
General:  Denies fever, fatigue HEENT: Denies angioedema, epistaxis; complains of hoarseness Pulmonary: Denies cough, wheeze, hemoptysis Cardiac: Admits to DOUGLAS, edema, orthopnea,  but denies exertional chest pain, palpitations, syncope, near syncope, bleeding, claudication, AICD firing GI:  Admits to abdominal bloating, anorexia but denies change in bowel habits, or black or bloody stools :  Admits to nocturia Musculo: Denies myalgias, arthralgias, + BLE edema Neuro: Denies headaches, CVA/TIA sx Skin:  Denies rash Heme: Denies bruising, bleeding, lymphadenopathy Psych: Admits to depression Physical Exam:  
 
Visit Vitals BP (!) 81/62 (BP 1 Location: Left arm, BP Patient Position: Sitting) Pulse 71 Temp 98 °F (36.7 °C) Resp 18 SpO2 100% General:  AAOx3 cooperative, mild distress. HEENT:  Atraumatic. Pink and moist.  Anicteric sclerae. Neck:   Supple, no adenopoathy, elevated JVP, +HJR Lungs:  Diminished bilateral breath sounds at the bases, No wheezing/rhonchi/rales. Heart:   Median sternotomy scar, sternectomy, AICD in left upper chest wall: Regular rhythm, S1, S2 present, 2/6 murmur, no rubs, S3 gallop. No carotid bruits. Abdomen:  Distended, non-tender. + Bowel sounds. No bruits. Extremities:  +3 bilateral pitting LE edema, no clubbing, no cyanosis. No calf tenderness Neurologic:  Grossly intact. Alert and oriented X 3. No acute neurological distress. Skin:   intact, no wounds, scattered ecchymosis and bruising, no rashes Psych:  Good insight. Depressed History: 
Past Medical History:  
Diagnosis Date  Degenerative disc disease, lumbar  Heart failure ([address]  High cholesterol  Hypertension  Paroxysmal atrial fibrillation ([facility] [address] 4/2/2019  Spinal stenosis Past Surgical History:  
Procedure Laterality Date  HX APPENDECTOMY  HX CORONARY ARTERY BYPASS GRAFT    
 triple  HX HERNIA REPAIR    
 HX IMPLANTABLE CARDIOVERTER DEFIBRILLATOR  IA CARDIOVERSION ELECTIVE ARRHYTHMIA EXTERNAL N/A 6/10/2019 EP CARDIOVERSION performed by John Santamaria MD at [address] CATH LAB  IA CARDIOVERSION ELECTIVE ARRHYTHMIA EXTERNAL N/A 6/18/2019 EP CARDIOVERSION performed by Gurpreet Gavin MD at [address] CATH LAB  IA INSJ/RPLCMT PERM DFB W/TRNSVNS LDS 1/DUAL CHMBR N/A 6/21/2019 INSERT ICD BIV MULTI performed by Carl Chandler MD at [address]  CATH LAB  TX TCAT IMPL WRLS P-ART PRS SNR L-T HEMODYN MNTR N/A 2019 IMPLANT HEART FAILURE MONITORING DEVICE performed by Clara Jackson MD at [address]  CATH LAB Social History Socioeconomic History  Marital status:  Spouse name: Not on file  Number of children: Not on file  Years of education: Not on file  Highest education level: Not on file Occupational History  Not on file Social Needs  Financial resource strain: Not on file  Food insecurity:  
  Worry: Not on file Inability: Not on file  Transportation needs:  
  Medical: Not on file Non-medical: Not on file Tobacco Use  Smoking status: Former Smoker Last attempt to quit: 2010 Years since quittin.9  Smokeless tobacco: Never Used Substance and Sexual Activity  Alcohol use: Not Currently Comment: rarely  Drug use: Never  Sexual activity: Not on file Lifestyle  Physical activity:  
  Days per week: Not on file Minutes per session: Not on file  Stress: Not on file Relationships  Social connections:  
  Talks on phone: Not on file Gets together: Not on file Attends Hindu service: Not on file Active member of club or organization: Not on file Attends meetings of clubs or organizations: Not on file Relationship status: Not on file  Intimate partner violence:  
  Fear of current or ex partner: Not on file Emotionally abused: Not on file Physically abused: Not on file Forced sexual activity: Not on file Other Topics Concern [address] Service Not Asked  Blood Transfusions Not Asked  Caffeine Concern Not Asked  Occupational Exposure Not Asked Ellender Share Hazards Not Asked  Sleep Concern Not Asked  Stress Concern Not Asked  Weight Concern Not Asked  Special Diet Not Asked  Back Care Not Asked  Exercise Not Asked  Bike Helmet Not Asked  Seat Belt Not Asked  Self-Exams Not Asked Social History Narrative  Not on file Family History Problem Relation Age of Onset  Lung Disease Mother  Hypertension Mother Maximalireza.Maze Arthritis-osteo Mother  Heart Disease Mother  Heart Disease Father  Diabetes Father Current Medications:  
Current Facility-Administered Medications Medication Dose Route Frequency Provider Last Rate Last Dose  sodium chloride (NS) flush 5-40 mL  5-40 mL IntraVENous Q8H Jacobo Herrera NP      
 sodium chloride (NS) flush 5-40 mL  5-40 mL IntraVENous PRN Jacobo Herrera NP      
 amiodarone (CORDARONE) tablet 200 mg  200 mg Oral BID Jacobo Herrera NP      
 apixaban (ELIQUIS) tablet 5 mg  5 mg Oral BID Dre Rosario NP      
 [START ON 10/26/2019] aspirin delayed-release tablet 81 mg  81 mg Oral DAILY Jacobo Herrera NP      
 carvedilol (COREG) tablet 6.25 mg  6.25 mg Oral BID WITH MEALS Jacobo Herrera NP      
 [START ON 10/26/2019] escitalopram oxalate (LEXAPRO) tablet 5 mg  5 mg Oral DAILY Jacobo Herrera NP      
 [START ON 10/26/2019] spironolactone (ALDACTONE) tablet 25 mg  25 mg Oral DAILY Jacobo Herrera NP      
 [START ON 10/26/2019] tamsulosin (FLOMAX) capsule 0.4 mg  0.4 mg Oral DAILY Jacobo Herrera NP      
 bumetanide (BUMEX) 0.25 mg/mL infusion  1 mg/hr IntraVENous CONTINUOUS Jacobo Herrera NP      
 milrinone (PRIMACOR) 20 MG/100 ML D5W infusion  0.375 mcg/kg/min IntraVENous CONTINUOUS Jacobo Herrera NP Allergies: No Known Allergies Recent Labs:  
No flowsheet data found. Lab Results Component Value Date/Time WBC 6.7 06/26/2019 03:00 AM  
 HGB 11.4 (L) 06/26/2019 03:00 AM  
 HCT 34.7 (L) 06/26/2019 03:00 AM  
 PLATELET 136 56/30/1005 03:00 AM  
 MCV 91.3 06/26/2019 03:00 AM  
 
Lab Results Component Value Date/Time  GFR est non-AA 26 (L) 07/12/2019 12:00 AM  
 GFR est AA 30 (L) 07/12/2019 12:00 AM  
 Creatinine 2.47 (H) 07/12/2019 12:00 AM  
 BUN 40 (H) 07/12/2019 12:00 AM  
 Sodium 137 07/12/2019 12:00 AM  
 Potassium 4.3 07/12/2019 12:00 AM  
 Chloride 93 (L) 07/12/2019 12:00 AM  
 CO2 25 07/12/2019 12:00 AM  
 Magnesium 2.4 06/26/2019 03:00 AM  
 Phosphorus 2.4 (L) 06/04/2019 04:09 AM  
 
Lab Results Component Value Date/Time TSH 2.45 06/01/2019 04:16 AM  
  
Lab Results Component Value Date/Time Sodium 137 07/12/2019 12:00 AM  
 Potassium 4.3 07/12/2019 12:00 AM  
 Chloride 93 (L) 07/12/2019 12:00 AM  
 CO2 25 07/12/2019 12:00 AM  
 Anion gap 8 06/26/2019 03:00 AM  
 Glucose 89 07/12/2019 12:00 AM  
 BUN 40 (H) 07/12/2019 12:00 AM  
 Creatinine 2.47 (H) 07/12/2019 12:00 AM  
 BUN/Creatinine ratio 16 07/12/2019 12:00 AM  
 GFR est AA 30 (L) 07/12/2019 12:00 AM  
 GFR est non-AA 26 (L) 07/12/2019 12:00 AM  
 Calcium 9.4 07/12/2019 12:00 AM  
 Bilirubin, total 0.5 06/26/2019 03:00 AM  
 ALT (SGPT) 14 06/26/2019 03:00 AM  
 AST (SGOT) 12 (L) 06/26/2019 03:00 AM  
 Alk. phosphatase 86 06/26/2019 03:00 AM  
 Protein, total 6.7 06/26/2019 03:00 AM  
 Albumin 3.0 (L) 06/26/2019 03:00 AM  
 Globulin 3.7 06/26/2019 03:00 AM  
 A-G Ratio 0.8 (L) 06/26/2019 03:00 AM  
  
No results found for: HBA1C, OIR6PFDU, HGBE8, QEG5QWSH, GWQ8UIKO, UJN1BPWB Thank you for letting us see him with you, Mounika Cee MD, Paz Ordaz Chief of Cardiology, [facility] Medical Director [facility] [address], [address] Office [phone] Fax [phone]

## 2019-10-25 NOTE — Clinical Note
ARTERIAL STICK WAS TAKEN FROM R FEMORAL ARTERY with a 22g NEEDLED FOR SAT AND HAND HELD WITH PRESSURE AFTERWARDS

## 2019-10-25 NOTE — Clinical Note
Mallampati: Class I - soft palate, uvula, fauces, pillars visible. ASA: Class 4 - patient with severe systemic disease that is a constant threat to life.

## 2019-10-25 NOTE — PROGRESS NOTES
Met Sona and his wife, Garnett Halsted, and introduced role of LVAD . Talked with them about the lifestyle changes with the potential VAD implant. Arranged for a current LVAD patient and his wife to meet with them on Monday. Will follow up on Monday to complete formal psychosocial assessment. Notified [facility] (spoke with Mayelin) as well as [facility] (spoke with Tracey) of Sona's admission. Tomas Cuba, MSW, LCSW Clinical  [facility] [phone]

## 2019-10-26 NOTE — PROGRESS NOTES
Problem: Falls - Risk of 
Goal: *Absence of Falls Description Document Devante Palacio Fall Risk and appropriate interventions in the flowsheet. Outcome: Progressing Towards Goal 
Note:  
Fall Risk Interventions: 
Mobility Interventions: Patient to call before getting OOB Medication Interventions: Evaluate medications/consider consulting pharmacy Elimination Interventions: Patient to call for help with toileting needs History of Falls Interventions: Door open when patient unattended Problem: Patient Education: Go to Patient Education Activity Goal: Patient/Family Education Outcome: Progressing Towards Goal 
  
Problem: Pain Goal: *Control of Pain Outcome: Progressing Towards Goal 
Goal: *PALLIATIVE CARE:  Alleviation of Pain Outcome: Progressing Towards Goal 
  
Problem: Patient Education: Go to Patient Education Activity Goal: Patient/Family Education Outcome: Progressing Towards Goal 
  
Problem: Pressure Injury - Risk of 
Goal: *Prevention of pressure injury Description Document Mich Scale and appropriate interventions in the flowsheet. Outcome: Progressing Towards Goal 
Note:  
Pressure Injury Interventions: 
Sensory Interventions: Assess changes in LOC Activity Interventions: Increase time out of bed Mobility Interventions: Pressure redistribution bed/mattress (bed type) Nutrition Interventions: Document food/fluid/supplement intake Problem: Patient Education: Go to Patient Education Activity Goal: Patient/Family Education Outcome: Progressing Towards Goal 
  
Problem: Infection - Risk of, Multi-drug Resistant Organism Colonization (MDRO) Goal: *Absence of MDRO colonization Outcome: Progressing Towards Goal 
Goal: *Absence of infection signs and symptoms Outcome: Progressing Towards Goal 
  
Problem: Patient Education: Go to Patient Education Activity Goal: Patient/Family Education Outcome: Progressing Towards Goal 
  
 Problem: Patient Education: Go to Patient Education Activity Goal: Patient/Family Education Outcome: Progressing Towards Goal 
  
Problem: Heart Failure: Day 1 Goal: Off Pathway (Use only if patient is Off Pathway) Outcome: Progressing Towards Goal 
Goal: Activity/Safety Outcome: Progressing Towards Goal 
Goal: Consults, if ordered Outcome: Progressing Towards Goal 
Goal: Diagnostic Test/Procedures Outcome: Progressing Towards Goal 
Goal: Nutrition/Diet Outcome: Progressing Towards Goal 
Goal: Discharge Planning Outcome: Progressing Towards Goal 
Goal: Medications Outcome: Progressing Towards Goal 
Goal: Respiratory Outcome: Progressing Towards Goal 
Goal: Treatments/Interventions/Procedures Outcome: Progressing Towards Goal 
Goal: Psychosocial 
Outcome: Progressing Towards Goal 
Goal: *Oxygen saturation within defined limits Outcome: Progressing Towards Goal 
Goal: *Hemodynamically stable Outcome: Progressing Towards Goal 
Goal: *Optimal pain control at patient's stated goal 
Outcome: Progressing Towards Goal 
Goal: *Anxiety reduced or absent Outcome: Progressing Towards Goal 
  
Problem: Heart Failure: Day 2 Goal: Off Pathway (Use only if patient is Off Pathway) Outcome: Progressing Towards Goal 
Goal: Activity/Safety Outcome: Progressing Towards Goal 
Goal: Consults, if ordered Outcome: Progressing Towards Goal 
Goal: Diagnostic Test/Procedures Outcome: Progressing Towards Goal 
Goal: Nutrition/Diet Outcome: Progressing Towards Goal 
Goal: Discharge Planning Outcome: Progressing Towards Goal 
Goal: Medications Outcome: Progressing Towards Goal 
Goal: Respiratory Outcome: Progressing Towards Goal 
Goal: Treatments/Interventions/Procedures Outcome: Progressing Towards Goal 
Goal: Psychosocial 
Outcome: Progressing Towards Goal 
Goal: *Oxygen saturation within defined limits Outcome: Progressing Towards Goal 
Goal: *Hemodynamically stable Outcome: Progressing Towards Goal 
 Goal: *Optimal pain control at patient's stated goal 
Outcome: Progressing Towards Goal 
Goal: *Anxiety reduced or absent Outcome: Progressing Towards Goal 
Goal: *Demonstrates progressive activity Outcome: Progressing Towards Goal 
  
Problem: Heart Failure: Day 3 Goal: Off Pathway (Use only if patient is Off Pathway) Outcome: Progressing Towards Goal 
Goal: Activity/Safety Outcome: Progressing Towards Goal 
Goal: Diagnostic Test/Procedures Outcome: Progressing Towards Goal 
Goal: Nutrition/Diet Outcome: Progressing Towards Goal 
Goal: Discharge Planning Outcome: Progressing Towards Goal 
Goal: Medications Outcome: Progressing Towards Goal 
Goal: Respiratory Outcome: Progressing Towards Goal 
Goal: Treatments/Interventions/Procedures Outcome: Progressing Towards Goal 
Goal: Psychosocial 
Outcome: Progressing Towards Goal 
Goal: *Oxygen saturation within defined limits Outcome: Progressing Towards Goal 
Goal: *Hemodynamically stable Outcome: Progressing Towards Goal 
Goal: *Optimal pain control at patient's stated goal 
Outcome: Progressing Towards Goal 
Goal: *Anxiety reduced or absent Outcome: Progressing Towards Goal 
Goal: *Demonstrates progressive activity Outcome: Progressing Towards Goal 
  
Problem: Heart Failure: Day 4 Goal: Off Pathway (Use only if patient is Off Pathway) Outcome: Progressing Towards Goal 
Goal: Activity/Safety Outcome: Progressing Towards Goal 
Goal: Diagnostic Test/Procedures Outcome: Progressing Towards Goal 
Goal: Nutrition/Diet Outcome: Progressing Towards Goal 
Goal: Discharge Planning Outcome: Progressing Towards Goal 
Goal: Medications Outcome: Progressing Towards Goal 
Goal: Respiratory Outcome: Progressing Towards Goal 
Goal: Treatments/Interventions/Procedures Outcome: Progressing Towards Goal 
Goal: Psychosocial 
Outcome: Progressing Towards Goal 
Goal: *Oxygen saturation within defined limits Outcome: Progressing Towards Goal 
 Goal: *Hemodynamically stable Outcome: Progressing Towards Goal 
Goal: *Optimal pain control at patient's stated goal 
Outcome: Progressing Towards Goal 
Goal: *Anxiety reduced or absent Outcome: Progressing Towards Goal 
Goal: *Demonstrates progressive activity Outcome: Progressing Towards Goal 
  
Problem: Heart Failure: Day 5 Goal: Off Pathway (Use only if patient is Off Pathway) Outcome: Progressing Towards Goal 
Goal: Activity/Safety Outcome: Progressing Towards Goal 
Goal: Diagnostic Test/Procedures Outcome: Progressing Towards Goal 
Goal: Nutrition/Diet Outcome: Progressing Towards Goal 
Goal: Discharge Planning Outcome: Progressing Towards Goal 
Goal: Medications Outcome: Progressing Towards Goal 
Goal: Respiratory Outcome: Progressing Towards Goal 
Goal: Treatments/Interventions/Procedures Outcome: Progressing Towards Goal 
Goal: Psychosocial 
Outcome: Progressing Towards Goal 
  
Problem: Heart Failure: Discharge Outcomes Goal: *Demonstrates ability to perform prescribed activity without shortness of breath or discomfort Outcome: Progressing Towards Goal 
Goal: *Left ventricular function assessment completed prior to or during stay, or planned for post-discharge Outcome: Progressing Towards Goal 
Goal: *ACEI prescribed if LVEF less than 40% and no contraindications or ARB prescribed Outcome: Progressing Towards Goal 
Goal: *Verbalizes understanding and describes prescribed diet Outcome: Progressing Towards Goal 
Goal: *Verbalizes understanding/describes prescribed medications Outcome: Progressing Towards Goal 
Goal: *Describes available resources and support systems Description 
(eg: Home Health, Palliative Care, Advanced Medical Directive) Outcome: Progressing Towards Goal 
Goal: *Describes smoking cessation resources Outcome: Progressing Towards Goal 
Goal: *Understands and describes signs and symptoms to report to providers(Stroke Metric) Outcome: Progressing Towards Goal 
 Goal: *Describes/verbalizes understanding of follow-up/return appt Description 
(eg: to physicians, diabetes treatment coordinator, and other resources Outcome: Progressing Towards Goal 
Goal: *Describes importance of continuing daily weights and changes to report to physician Outcome: Progressing Towards Goal

## 2019-10-26 NOTE — CONSULTS
Name: [facility]: [address]  
: 1950 Admit Date: 10/25/2019 Phone: [phone]  Room: [id number] PCP: Maryam Kunz MD  MRN: [id number] Date: 10/26/2019  Code: Full Code HPI: 
 
4:00 PM    
 
History was obtained from patient. I was asked by Amanda Mandel MD to see Lonnell Schwab in consultation for a chief complaint of abnormal CT Chest. 
 
History of Present Illness: 71year old male with past medical history of cardiomyopathy who is being evaluated for LVAD placement. Patient had a abnormal CT Chest and pulmonary consultation is requested. Cardiac history include - 
CAD - s/p VFib cardiac arrest s/p CABG  - developed sternal wound infection and subsequently sternum was removed. Ischemic cardiomyopathy with EF 15%. S/p ICD Afib s/p DCCV. S/p BiVICD 2019. On IV Milrinone gtt. Admitted with decompensated heart failure with increased shortness of breath, generalized weakness, leg swelling. CT Chest showed 2 cm pre tracheal LN. Seen by ENT today vocal cords paralysis. Patient notes hx of hoarseness for the past 3 months. Past Medical History:  
Diagnosis Date  Degenerative disc disease, lumbar  Heart failure ([address]  High cholesterol  Hypertension  Paroxysmal atrial fibrillation ([address] 2019  Spinal stenosis Past Surgical History:  
Procedure Laterality Date  HX APPENDECTOMY  HX CORONARY ARTERY BYPASS GRAFT    
 triple  HX HERNIA REPAIR    
 HX IMPLANTABLE CARDIOVERTER DEFIBRILLATOR  MI CARDIOVERSION ELECTIVE ARRHYTHMIA EXTERNAL N/A 6/10/2019 EP CARDIOVERSION performed by Magdi Martinez MD at [address] CATH LAB  MI CARDIOVERSION ELECTIVE ARRHYTHMIA EXTERNAL N/A 2019 EP CARDIOVERSION performed by Vikash Staples MD at [address] CATH LAB  MI INSJ/RPLCMT PERM DFB W/TRNSVNS LDS 1/DUAL CHMBR N/A 2019 INSERT ICD BIV MULTI performed by Imelda Wallace MD at [address]  CATH LAB  OR TCAT IMPL WRLS P-ART PRS SNR L-T HEMODYN MNTR N/A 2019 IMPLANT HEART FAILURE MONITORING DEVICE performed by Saundra Camara MD at [address]  CATH LAB Family History Problem Relation Age of Onset  Lung Disease Mother  Hypertension Mother [facility] Arthritis-osteo Mother  Heart Disease Mother  Heart Disease Father  Diabetes Father Social History Tobacco Use  Smoking status: Former Smoker Last attempt to quit: 2010 Years since quittin.9  Smokeless tobacco: Never Used Substance Use Topics  Alcohol use: Not Currently Comment: rarely No Known Allergies Current Facility-Administered Medications Medication Dose Route Frequency  0.9% sodium chloride infusion  10 mL/hr IntraVENous CONTINUOUS  
 amiodarone (CORDARONE) tablet 100 mg  100 mg Oral BID  iron sucrose (VENOFER) 300 mg in 0.9% sodium chloride 250 mL IVPB  300 mg IntraVENous Q24H  
 heparin 25,000 units in D5W 250 ml infusion  11-25 Units/kg/hr IntraVENous TITRATE  alteplase (CATHFLO) 1 mg in sterile water (preservative free) 1 mL injection  1 mg InterCATHeter PRN  
 influenza vaccine - (6 mos+)(PF) (FLUARIX/FLULAVAL/FLUZONE QUAD) injection 0.5 mL  0.5 mL IntraMUSCular PRIOR TO DISCHARGE  sodium chloride (NS) flush 5-40 mL  5-40 mL IntraVENous Q8H  
 sodium chloride (NS) flush 5-40 mL  5-40 mL IntraVENous PRN  
 aspirin delayed-release tablet 81 mg  81 mg Oral DAILY  spironolactone (ALDACTONE) tablet 25 mg  25 mg Oral DAILY  bumetanide (BUMEX) 0.25 mg/mL infusion  1 mg/hr IntraVENous CONTINUOUS  
 milrinone (PRIMACOR) 20 MG/100 ML D5W infusion  0.3 mcg/kg/min IntraVENous CONTINUOUS  
 tamsulosin (FLOMAX) capsule 0.8 mg  0.8 mg Oral DAILY  potassium chloride SR (KLOR-CON 10) tablet 20 mEq  20 mEq Oral TID  allopurinol (ZYLOPRIM) tablet 100 mg  100 mg Oral DAILY REVIEW OF SYSTEMS Negative except as stated in the HPI. Physical Exam:  
Visit Vitals BP (!) 87/67 Pulse 80 Temp 97.9 °F (36.6 °C) Resp 23 Wt 88.2 kg (194 lb 7.1 oz) SpO2 93% BMI 24.97 kg/m² General:  Alert, cooperative, no distress, appears stated age. Head:  Normocephalic, without obvious abnormality, atraumatic. Eyes:  Conjunctivae/corneas clear. PERRL, EOMs intact. Nose: Nares normal.  
Throat: Lips, mucosa, and tongue normal.  
Neck: Supple, symmetrical, trachea midline, no adenopathy. Lungs:   Clear to auscultation bilaterally. Heart:  Regular rate and rhythm, S1, S2 normal, no murmur, click, rub or gallop. Abdomen:   Soft, non-tender. Bowel sounds normal.  
Extremities: Extremities normal.  
Pulses: 2+ and symmetric all extremities. Skin: Skin color, texture, turgor normal. No rashes or lesions Neurologic: Grossly nonfocal  
 
 
Lab Results Component Value Date/Time Sodium 124 (L) 10/26/2019 04:09 AM  
 Potassium 3.7 10/26/2019 10:35 AM  
 Chloride 87 (L) 10/26/2019 04:09 AM  
 CO2 28 10/26/2019 04:09 AM  
 BUN 30 (H) 10/26/2019 04:09 AM  
 Creatinine 1.86 (H) 10/26/2019 04:09 AM  
 Glucose 259 (H) 10/26/2019 04:09 AM  
 Calcium 8.2 (L) 10/26/2019 04:09 AM  
 Magnesium 2.1 10/26/2019 10:35 AM  
 Phosphorus 2.4 (L) 06/04/2019 04:09 AM  
 Lactic acid 1.7 10/25/2019 07:28 PM  
 
 
Lab Results Component Value Date/Time WBC 4.2 10/26/2019 04:09 AM  
 HGB 9.8 (L) 10/26/2019 04:09 AM  
 PLATELET 419 68/20/8925 04:09 AM  
 MCV 90.1 10/26/2019 04:09 AM  
 
 
Lab Results Component Value Date/Time INR 1.6 (H) 10/25/2019 02:56 PM  
 AST (SGOT) 90 (H) 10/26/2019 04:09 AM  
 Alk. phosphatase 137 (H) 10/26/2019 04:09 AM  
 Protein, total 5.8 (L) 10/26/2019 04:09 AM  
 Albumin 2.5 (L) 10/26/2019 04:09 AM  
 Globulin 3.3 10/26/2019 04:09 AM  
 
 
Lab Results Component Value Date/Time  Iron 28 (L) 10/25/2019 02:56 PM  
 TIBC 324 10/25/2019 02:56 PM  
 Iron % saturation 9 (L) 10/25/2019 02:56 PM  
 Ferritin 100 10/25/2019 02:56 PM  
 
 
Lab Results Component Value Date/Time TSH 2.40 10/25/2019 07:39 PM  
  
 
Lab Results Component Value Date/Time PHI 7.518 (H) 10/25/2019 04:12 PM  
 PCO2I 32.5 (L) 10/25/2019 04:12 PM  
 PO2I 70 (L) 10/25/2019 04:12 PM  
 HCO3I 26.4 (H) 10/25/2019 04:12 PM  
 FIO2I 21 10/26/2019 01:28 PM  
 
 
Lab Results Component Value Date/Time CK 51 10/25/2019 02:56 PM  
 Troponin-I, Qt. <0.05 10/25/2019 02:56 PM  
  
 
Lab Results Component Value Date/Time Culture result: NO GROWTH AFTER 9 HOURS 10/25/2019 07:14 PM  
 Culture result: ENTEROBACTER CLOACAE (A) 06/26/2019 12:25 PM  
 Culture result: NO GROWTH 1 DAY 06/13/2019 10:44 AM  
 
 
Lab Results Component Value Date/Time Hepatitis B surface Ag <0.10 06/13/2019 11:40 AM  
 Hepatitis B surface Ag <0.10 06/13/2019 11:40 AM  
 
 
Lab Results Component Value Date/Time CK 51 10/25/2019 02:56 PM  
 
 
Lab Results Component Value Date/Time Color YELLOW/STRAW 10/25/2019 05:30 PM  
 Appearance CLEAR 10/25/2019 05:30 PM  
 pH (UA) 6.0 10/25/2019 05:30 PM  
 Protein NEGATIVE  10/25/2019 05:30 PM  
 Glucose NEGATIVE  10/25/2019 05:30 PM  
 Ketone NEGATIVE  10/25/2019 05:30 PM  
 Bilirubin NEGATIVE  10/25/2019 05:30 PM  
 Blood SMALL (A) 10/25/2019 05:30 PM  
 Urobilinogen 1.0 10/25/2019 05:30 PM  
 Nitrites NEGATIVE  10/25/2019 05:30 PM  
 Leukocyte Esterase NEGATIVE  10/25/2019 05:30 PM  
 WBC 0-4 10/25/2019 05:30 PM  
 RBC 0-5 10/25/2019 05:30 PM  
 Bacteria NEGATIVE  10/25/2019 05:30 PM  
 
 
IMPRESSION: 
========== 
-Mediastinal lymphadenopathy. -CAD - s/p VFib cardiac arrest s/p CABG 2011 @ [facility] - Dr Almas Cook. 
-Ischemic cardiomyopathy with EF 15% - On IV Milrinone gtt. -Afib s/p DCCV. -S/p BiVICD 6/21/2019. 
-JOSE. -Hx of DVT. -Nicotine dependence. PLAN: 
==== 
-History of sternal wound infection in 2011 and subsequently sternum was removed. -differential diagnoses - reactive vs sarcoid vs malignancy. 
-Tough case and will need general anesthesia for EBUS. High risk for decompensation with GA. 
-Will need to assess with Cardiac anesthesiologist. 
-safest option will be to do a PET/CT and that if increased PET activity, will consider EBUS assisted sampling. Thank you for the consult. Will follow.  
 
Lea Sarkar MD

## 2019-10-26 NOTE — PROGRESS NOTES
Physical Therapy 10/26/19 Orders acknowledged and chart reviewed. Cleared by RN. SBP approx 30 min prior to initiation of evaluation in 90s. SBP dropped to 88 mmHg, prompted patient to perform ankle pumps. SBP then 78 mmHg. RN alerted. Per RN, MD to arrive shortly to place central line. Will hold and follow-up as appropriate. Of note, obtained home set up and PLOF. Pt states he has not been able to walk very far 2/2 weakness and fatigue as well as multiple falls 2/2 dizziness upon standing. Thank you. Ishaan Phillips

## 2019-10-26 NOTE — CONSULTS
[address] Name:  Santos Johnson 
MR#:  [id number] :  1950 ACCOUNT #:  [de-identified] DATE OF SERVICE:  10/26/2019 REASON FOR CONSULTATION:  Hoarseness. HISTORY OF PRESENT ILLNESS:  The patient is a 40-year-old gentleman, who was admitted with worsening heart failure. He has a history of heart disease. He has undergone heart surgery in the past.  He has ischemic cardiomyopathy. He states that he has noted hoarseness for approximately 3 months now. He has also noted some dysphagia, occasional choking, and he has had weight loss for the past 3 moths. He has had progressive weakness as well. PAST MEDICAL HISTORY:  Significant for hypertension, atrial fibrillation, spinal stenosis, history of heart disease, and degenerative disk disease. PAST SURGICAL HISTORY:  Surgeries in the past include herniorrhaphy, cardiac bypass, appendectomy. He has had a cardioversion in the past.  He is presently being evaluated for an LVAD procedure. SOCIAL HISTORY:  He has history of tobacco use. He quit in . He denies any use of alcohol on a regular basis at this time. REVIEW OF SYSTEMS:  The patient has had dyspnea recently, as well as intermittent dysphagia, no other GI problems. PHYSICAL EXAMINATION: 
GENERAL:  The examination today demonstrates an elderly gentleman, who is alert and cooperative. He answers questions appropriately. The voice is notably hoarse. There is no stridor or dyspnea. HEENT:  The oral cavity and oropharynx demonstrate normal mucosa. No lesions present. Nasal cavity demonstrates some dryness. The nose was topically decongested and anesthetized, and a flexible fiberoptic nasopharyngoscope was passed. The nasopharynx and pharynx were normal.  The larynx was well visualized. There is a left vocal cord paralysis. There is no mucosal lesions noted in the larynx.   The hypopharynx is patent with no pooling of secretions noted. The epiglottis and tongue bases are normal.  Palpation of the neck demonstrates a midline larynx and trachea. There is no abnormal adenopathy in the neck. LABORATORY DATA: I reviewed the patient's chest CT. He does have mediastinal adenopathy. IMPRESSION:  Vocal cord paralysis and mediastinal adenopathy concerning for malignancy. Given the history of vocal cord paralysis, weight loss, and dysphagia with mediastinal adenopathy noted on chest CT, there is a high probability that he may have malignant process in the mediastinum. A biopsy will be necessary. This could be accomplished transbronchial with an aspiration of his mediastinal nodes. Consult Pulmonary for this evaluation. In the meantime, he should have dietary precautions. I would avoid thin liquids and give him the aspiration-type diet to prevent aspiration of his left vocal cord paralysis. Inez Diamodn MD 
 
 
[id number] 
D:  10/26/2019 11:33 
T:  10/26/2019 16:16 
JOB #:  [id number] CC:  Alee Hernandez MD

## 2019-10-26 NOTE — PROGRESS NOTES
1430 Patient arrived to the unit as a direct admit. He was disconnected from his personal Milrinone gtt and his wife took the pump home. 1730 TRANSFER - OUT REPORT: 
 
Verbal report given to Alok Arizmendi RN (name) on Zay Wheeler  being transferred to CCU (unit) for routine progression of care Report consisted of patients Situation, Background, Assessment and  
Recommendations(SBAR). Information from the following report(s) SBAR, Kardex, Procedure Summary, Intake/Output, MAR, Recent Results and Med Rec Status was reviewed with the receiving nurse. Lines: PICC Double Lumen 23/76/04 Right;Basilic (Active) Opportunity for questions and clarification was provided. Patient transported with: 
 Monitor Registered Nurse

## 2019-10-26 NOTE — PROGRESS NOTES
Central Line and Tanda Gloss Procedure Note Indication: Inadequate venous access Need for vasopressors,LVAD workup Risks, benefits, alternatives explained and patient agrees to proceed. Patient positioned in Trendelenburg. An ultrasound was used and the vascular structures were identified and marked. 7-Step Sterility Protocol followed. (cap, mask sterile gown, sterile gloves, large sterile sheet, hand hygiene, 2% chlorhexidine for cutaneous antisepsis) 5 mL 1% Lidocaine placed at insertion site. Right internal jugular cannulated x 1 attempt(s) utilizing the Seldinger technique. A 9 Fr MAC catheter was secured with a suture and a Biopatch and sterile Tegaderm were applied. Linda Hole was passed but was unable advance past the Right ventricle and was left at 20 cm. All ports were aspirated and flushed. CXR pending.

## 2019-10-26 NOTE — PROGRESS NOTES
Leeann Note DOS:  10/26/2019 REFERRING PROVIDER:  Louann Valdivia MD 
PRIMARY CARE PHYSICIAN: Kassidy Lane MD 
PRIMARY CARDIOLOGIST: Louann Valdivia MD 
 
 
 
No chief complaint on file. HPI: Anabel Herrera is a 71y.o. year old pleasant white male with a history of HTN, HLD, JOSE, CAD s/p cardiac arrest VFib s/p CABG (2011) c/b sternal would infection and sternectomy, ischemic cardiomyopathy LVEF 15-20%, s/p ICD and with LBBB. He was admitted with acute on chronic systolic heart failure with massive volume overload > 20 lbs, in the setting of atrial fibrillation s/p failed DCCV and underwent DCCV and RHC on 6/18. S/p BiVICD on 6/21/19 with Dr. Bandar Carlson. He was discharged home home on IV milrinone on 6/26/19. He has been followed closely by Dr. Ba Chavis and the [facility]. Mr. Fransisco Vazquez returns to clinic for follow up with his wife. His dyspnea has progressed to the point he is sitting in a chair most of the day. He is unable to perform simple ADLs without limiting dyspnea. His appetite is down. He underwent a sleep study and is using CPAP but has difficulty due to frequent nocturia. He denies presyncope or syncope. He has had a few falls due to generalized weakness but did not hit his head nor lose consciousness. Recent labs reveal stable creatinine from 1.9 - 2.1. He is currently taking torsemide 60 mg bid. His wife has noticed an odor to his urine. He denies fever/chills but admits to feeling cold all the time. Recent Events: 
Vocal chord paralysis confirmed by ENT Mediastinal LAD Diuresing well PA cath placed - unable to advance IMPRESSION/PLAN: 
1. Acute on chronic systolic heart failure 
 ICM, Stage D, NYHA Class IV, LVEF 16-20%, s/p CRT on 6/21/19 PA cath advanced with good pressure waveforms CRT non-responder Intolerant to RAASi d/t renal dysfunction Reduce carvedilol to 3.125 mg BID d/t RV failure PCWP 22 mmHg today Reduce IV milrinone to 0.3 mcgs/kg/min Continue  IV bumex infusion 1 mg/hour Continue spironolactone Follow renal function and electrolytes closely 2. History of VF arrest - s/p AICD No recent shocks 3. CAD s/p CABG in 2010 Recommend [facility] 4. PAF s/p DCCV 6/18/19 Hold eliquis Start IV heparin for CVA prophylaxis Continue amiodarone 5. Chronic Kidney Disease 3 - single kidney Followed closely by Dr. Fiona Sanders Consult nephrology 6. History of Urinary Retention UA and urine culture Lizama cath placed 7. JOSE on CPAP Encourage use of home CPAP 
 
8. History of Sternal wound infection requiring sternectomy Stable CV surgery aware - not a contraindication for LVAD 9. Anemia, iron deficiency Iron sat 9% 10. H/o DVT IV heparin 11. Migraines 12. H/o tobacco abuse Counseling - continued abstinence 13. Depression On lexapro - consider increasing his dose 14. Anorexia - likely multifactorial (depression, congestive hepatopathy) Prealbumin 13.8 Nutritional supplements 15. Mediastinal Lymphadenopathy Concerning for lymphoma/lung cancer Plan trans-bronchial biopsy next week 16. Vocal Cord Paralysis Speech therapy consult Aspiration Precautions Thickened liquids Critical care was necessary to treat or prevent imminent or life threatening deterioration of the following conditions: cardiac failure, respiratory failure and CNS failure or compromise Total Critical Care time spent: 0636-6073 
45 minutes. There was no overlap with other services Services Provided: 1. Telemetry review and 12 lead ECG interpretation 2. Hemodynamic interpretation, assessment, and management 3. Review and interpretation of CXR 4. Review and interpretation of lab values 5. Review and interpretation of microbiologic data and culture results 6. Review of medications and administration 7. Review and interpretation of nutrition requirements and management 8. Discussion of management withother consultants and services 9. Clinical update to family members Mounika QUEZADAKit [place] MD Ceci, Jewel Mike Medical Director Calos Rueda [address] [place] [address], [address] Office [phone] Fax [phone] CARDIAC EVALUATION 
ECHO (5/31/19) LVEF 21-25%, severely dilated LA, moderately dilated RA 
ECHO (6/24/19) LVEF 16-20%, Severely dilated LV, trace MR, trace TR 
 
EKG (6/12/19) atrial flutter with occasional PVCs, LBBB QRS 158ms EKG (6/26/19) Atrial fibrillation with premature ventricular or aberrantly conducted complexes, Left bundle branch block, rate 86, , QTc 564 [facility] not in epic Review of Systems:    
General:  Denies fever, fatigue HEENT: Denies angioedema, epistaxis; complains of hoarseness Pulmonary: Denies cough, wheeze, hemoptysis Cardiac: Admits to DOUGLAS, edema, orthopnea,  but denies exertional chest pain, palpitations, syncope, near syncope, bleeding, claudication, AICD firing GI:  Admits to abdominal bloating, anorexia but denies change in bowel habits, or black or bloody stools :  Admits to nocturia Musculo: Denies myalgias, arthralgias, + BLE edema Neuro: Denies headaches, CVA/TIA sx Skin:  Denies rash Heme: Denies bruising, bleeding, lymphadenopathy Psych: Admits to depression Physical Exam:  
 
Visit Vitals BP 93/70 Pulse 90 Temp 97.4 °F (36.3 °C) Resp 23 Wt 194 lb 7.1 oz (88.2 kg) SpO2 98% BMI 24.97 kg/m² Hemodynamics: 
 CO: CO (l/min): 4.6 l/min CI: CI (l/min/m2): 1.5 l/min/m2 CVP: CVP (mmHg): 12 mmHg (10/26/19 1500) SVR: SVR (dyne*sec)/cm5: 831 (dyne*sec)/cm5 (10/26/19 1330) PAP Systolic: PAP Systolic: 54 (31/43/30 9007) PAP Diastolic: PAP Diastolic: 32 (04/83/27 5681) PVR:   
 SV02:   
 SCV02: SCVO2 (%): 47 % (10/26/19 1400) General:  AAOx3 cooperative, mild distress. HEENT:  Atraumatic. Pink and moist.  Anicteric sclerae. Neck:   Supple, no adenopoathy, elevated JVP, +HJR Lungs:  Diminished bilateral breath sounds at the bases, No wheezing/rhonchi/rales. Heart:   Median sternotomy scar, sternectomy, AICD in left upper chest wall: Regular rhythm, S1, S2 present, 2/6 murmur, no rubs, S3 gallop. No carotid bruits. Abdomen:  Distended, non-tender. + Bowel sounds. No bruits. Extremities:  +2 bilateral pitting LE edema, no clubbing, no cyanosis. No calf tenderness Neurologic:  Grossly intact. Alert and oriented X 3. No acute neurological distress. Skin:   intact, no wounds, scattered ecchymosis and bruising, no rashes Psych:  Good insight. Depressed History: 
Past Medical History:  
Diagnosis Date  Degenerative disc disease, lumbar  Heart failure ([facility]  High cholesterol  Hypertension  Paroxysmal atrial fibrillation ([facility] 4/2/2019  Spinal stenosis Past Surgical History:  
Procedure Laterality Date  HX APPENDECTOMY  HX CORONARY ARTERY BYPASS GRAFT    
 triple  HX HERNIA REPAIR    
 HX IMPLANTABLE CARDIOVERTER DEFIBRILLATOR  RI CARDIOVERSION ELECTIVE ARRHYTHMIA EXTERNAL N/A 6/10/2019 EP CARDIOVERSION performed by Kayley Lilly MD at [place] [address], [facility] CATH LAB  RI CARDIOVERSION ELECTIVE ARRHYTHMIA EXTERNAL N/A 6/18/2019 EP CARDIOVERSION performed by Kathie Moraes MD at [place] [address], [facility] [address] CATH LAB  RI INSJ/RPLCMT PERM DFB W/TRNSVNS LDS 1/DUAL CHMBR N/A 6/21/2019 INSERT ICD BIV MULTI performed by Shaylee Espinoza MD [place] [address], [facility] [address] CATH LAB  RI TCAT IMPL WRLS P-ART PRS SNR L-T HEMODYN MNTR N/A 9/18/2019 IMPLANT HEART FAILURE MONITORING DEVICE performed by Brigette Carreon MD at [facility] [place] [address], [facility]  CATH LAB Social History Socioeconomic History  Marital status:  Spouse name: Not on file  Number of children: Not on file  Years of education: Not on file  Highest education level: Not on file Occupational History  Not on file Social Needs  Financial resource strain: Not on file  Food insecurity:  
  Worry: Not on file Inability: Not on file  Transportation needs:  
  Medical: Not on file Non-medical: Not on file Tobacco Use  Smoking status: Former Smoker Last attempt to quit: 2010 Years since quittin.9  Smokeless tobacco: Never Used Substance and Sexual Activity  Alcohol use: Not Currently Comment: rarely  Drug use: Never  Sexual activity: Not on file Lifestyle  Physical activity:  
  Days per week: Not on file Minutes per session: Not on file  Stress: Not on file Relationships  Social connections:  
  Talks on phone: Not on file Gets together: Not on file Attends [employer] service: Not on file Active member of club or organization: Not on file Attends meetings of clubs or organizations: Not on file Relationship status: Not on file  Intimate partner violence:  
  Fear of current or ex partner: Not on file Emotionally abused: Not on file Physically abused: Not on file Forced sexual activity: Not on file Other Topics Concern [address] Service Not Asked  Blood Transfusions Not Asked  Caffeine Concern Not Asked  Occupational Exposure Not Asked Hettie Model Hazards Not Asked  Sleep Concern Not Asked  Stress Concern Not Asked  Weight Concern Not Asked  Special Diet Not Asked  Back Care Not Asked  Exercise Not Asked  Bike Helmet Not Asked  Seat Belt Not Asked  Self-Exams Not Asked Social History Narrative  Not on file Family History Problem Relation Age of Onset  Lung Disease Mother  Hypertension Mother Canseco Arthritis-osteo Mother  Heart Disease Mother  Heart Disease Father  Diabetes Father Current Medications: Current Facility-Administered Medications Medication Dose Route Frequency Provider Last Rate Last Dose  potassium chloride 10 mEq in 50 ml IVPB  10 mEq IntraVENous ONCE Mounika Altman MD 50 mL/hr at 10/26/19 0921 10 mEq at 10/26/19 0921  
 0.9% sodium chloride infusion  10 mL/hr IntraVENous CONTINUOUS Claudean Holy C, MD 10 mL/hr at 10/26/19 0622 10 mL/hr at 10/26/19 0622  
 midazolam (VERSED) injection 2 mg  2 mg IntraVENous Nia Pitts MD      
 sodium chloride (NS) flush 5-40 mL  5-40 mL IntraVENous Q8H Nicho Herrera, NP   10 mL at 10/26/19 9740  sodium chloride (NS) flush 5-40 mL  5-40 mL IntraVENous PRN Nicho Herrera NP      
 amiodarone (CORDARONE) tablet 200 mg  200 mg Oral BID Nicho Herrera NP   200 mg at 10/26/19 3947  apixaban (ELIQUIS) tablet 5 mg  5 mg Oral BID Nicho Herrera, NP   5 mg at 10/26/19 9809  aspirin delayed-release tablet 81 mg  81 mg Oral DAILY Nicho Herrera NP   81 mg at 10/26/19 1669  escitalopram oxalate (LEXAPRO) tablet 5 mg  5 mg Oral DAILY Nicho Herrera, NP   5 mg at 10/26/19 8367  spironolactone (ALDACTONE) tablet 25 mg  25 mg Oral DAILY Nicho Herrera NP   25 mg at 10/26/19 4395  bumetanide (BUMEX) 0.25 mg/mL infusion  1 mg/hr IntraVENous CONTINUOUS Checo Herrera NP 4 mL/hr at 10/26/19 1020 1 mg/hr at 10/26/19 1020  
 milrinone (PRIMACOR) 20 MG/100 ML D5W infusion  0.375 mcg/kg/min IntraVENous CONTINUOUS Nicho Herrera NP 9.9 mL/hr at 10/26/19 0811 0.375 mcg/kg/min at 10/26/19 3892  tamsulosin (FLOMAX) capsule 0.8 mg  0.8 mg Oral DAILY Logan Kincaid MD   0.8 mg at 10/26/19 4908  potassium chloride SR (KLOR-CON 10) tablet 20 mEq  20 mEq Oral TID Claudean Holy C, MD   20 mEq at 10/26/19 0849  
 allopurinol (ZYLOPRIM) tablet 100 mg  100 mg Oral DAILY Ann Marie Santos MD   100 mg at 10/26/19 5154 Allergies: No Known Allergies Recent Labs: Labs Latest Ref Rng & Units 10/26/2019 10/25/2019 WBC 4.1 - 11.1 K/uL 4.2 3. 9(L) RBC 4.10 - 5.70 M/uL 3.35(L) 3.47(L) Hemoglobin 12.1 - 17.0 g/dL 9.8(L) 10. 1(L) Hematocrit 36.6 - 50.3 % 30. 2(L) 31. 5(L) MCV 80.0 - 99.0 FL 90.1 90.8 Platelets 952 - 603 K/uL 156 175 Albumin 3.5 - 5.0 g/dL 2. 5(L) 2. 8(L) Calcium 8.5 - 10.1 MG/DL 8. 2(L) 8.6 SGOT 15 - 37 U/L 90(H) 109(H) Glucose 65 - 100 mg/dL 259(H) 110(H) BUN 6 - 20 MG/DL 30(H) 38(H) Creatinine 0.70 - 1.30 MG/DL 1.86(H) 2.18(H) Sodium 136 - 145 mmol/L 124(L) 128(L) Potassium 3.5 - 5.1 mmol/L 3. 1(L) 3.4(L) TSH 0.36 - 3.74 uIU/mL - 2.40 PSA 0.01 - 4.0 ng/mL - 0.3 LDH 85 - 241 U/L - 284(H) Some recent data might be hidden Lab Results Component Value Date/Time WBC 4.2 10/26/2019 04:09 AM  
 HGB 9.8 (L) 10/26/2019 04:09 AM  
 HCT 30.2 (L) 10/26/2019 04:09 AM  
 PLATELET 718 66/14/7222 04:09 AM  
 MCV 90.1 10/26/2019 04:09 AM  
 
Lab Results Component Value Date/Time GFR est non-AA 36 (L) 10/26/2019 04:09 AM  
 GFR est AA 44 (L) 10/26/2019 04:09 AM  
 Creatinine 1.86 (H) 10/26/2019 04:09 AM  
 BUN 30 (H) 10/26/2019 04:09 AM  
 Sodium 124 (L) 10/26/2019 04:09 AM  
 Potassium 3.1 (L) 10/26/2019 04:09 AM  
 Chloride 87 (L) 10/26/2019 04:09 AM  
 CO2 28 10/26/2019 04:09 AM  
 Magnesium 2.4 06/26/2019 03:00 AM  
 Phosphorus 2.4 (L) 06/04/2019 04:09 AM  
 
Lab Results Component Value Date/Time TSH 2.40 10/25/2019 07:39 PM  
 T4, Free 1.7 (H) 10/25/2019 07:39 PM  
  
Lab Results Component Value Date/Time  Sodium 124 (L) 10/26/2019 04:09 AM  
 Potassium 3.1 (L) 10/26/2019 04:09 AM  
 Chloride 87 (L) 10/26/2019 04:09 AM  
 CO2 28 10/26/2019 04:09 AM  
 Anion gap 9 10/26/2019 04:09 AM  
 Glucose 259 (H) 10/26/2019 04:09 AM  
 BUN 30 (H) 10/26/2019 04:09 AM  
 Creatinine 1.86 (H) 10/26/2019 04:09 AM  
 BUN/Creatinine ratio 16 10/26/2019 04:09 AM  
 GFR est AA 44 (L) 10/26/2019 04:09 AM  
 GFR est non-AA 36 (L) 10/26/2019 04:09 AM  
 Calcium 8.2 (L) 10/26/2019 04:09 AM  
 Bilirubin, total 1.4 (H) 10/26/2019 04:09 AM  
 ALT (SGPT) 138 (H) 10/26/2019 04:09 AM  
 AST (SGOT) 90 (H) 10/26/2019 04:09 AM  
 Alk. phosphatase 137 (H) 10/26/2019 04:09 AM  
 Protein, total 5.8 (L) 10/26/2019 04:09 AM  
 Albumin 2.5 (L) 10/26/2019 04:09 AM  
 Globulin 3.3 10/26/2019 04:09 AM  
 A-G Ratio 0.8 (L) 10/26/2019 04:09 AM  
  
Lab Results Component Value Date/Time Hemoglobin A1c 6.6 (H) 10/25/2019 02:56 PM  
  
 
 
 
Thank you for letting us see him with you, Mounika Yen MD, Cong Dailey Chief of Cardiology, [facility] Medical Director [facility] Rueda [address], [address] [place] [address] Office [phone] Fax [phone]

## 2019-10-26 NOTE — PROGRESS NOTES
[facility] 
 [id number] [facility], [address] [facility], [facility] Phone: [phone]   [phone]   
  
Nephrology Progress Note Sona Kiser     1950     [id number] Date of Admission : 10/25/2019 
10/26/19 CC:  Follow up for JUAN J, CKD, Hyponatremia Assessment and Plan JUAN J/CKD Stage IV : 
- Creatinine close to baseline (2-2.2) but he is very volume overloaded  
- he has element of CRS and post renal problems affecting renal function  
- continue Bumex infusion and inotropes Hyponatremia - corrected Na~126 today 
-drinking about 3 liters per day 
-fluid restrict to 1000 cc/day, plus a few ice chips Hoarseness, vocal cord paralysis, adenopathy 
-malignancy a concern; evaluationin progcess 
  
Left renal Atrophy : 
- Ordered Nuclear renal perfusion scan to assess flow and function  
  
BPH w/ urinary retention - Improved PVR after starting Flomax  
- trial of Higher dose of Flomax - place neal if PVR > 200 -- d/w CCU nurse  
  
Acute on Chronic HFrEF  
- EF 16-20%, NYHA Class IV , hx of VF arrest - s/p AICD 
- On Bumex gtt, Milrinone gtt, coreg and aldactone  
- being w/u for LVAD  
  
Recurrent UTI  
- check UA and cx 
  
JOSE on CPAP  
  
PAF s/p DCCV 6/19 
- on Eliquis and amiodarone  
  
Worsening Anemia - iron sat=9% with low Ferritin 
-start IV iron Interval History:  A+O, nto SOB at rest; dinking at least 3 liters fluid per day due to dry mouth; Dr. Rosaline Kamara at bedside Review of Systems: Pertinent items are noted in HPI. Current Medications:  
Current Facility-Administered Medications Medication Dose Route Frequency  0.9% sodium chloride infusion  10 mL/hr IntraVENous CONTINUOUS  
 amiodarone (CORDARONE) tablet 100 mg  100 mg Oral BID  sodium chloride (NS) flush 5-40 mL  5-40 mL IntraVENous Q8H  
 sodium chloride (NS) flush 5-40 mL  5-40 mL IntraVENous PRN  
 apixaban (ELIQUIS) tablet 5 mg  5 mg Oral BID  
  aspirin delayed-release tablet 81 mg  81 mg Oral DAILY  spironolactone (ALDACTONE) tablet 25 mg  25 mg Oral DAILY  bumetanide (BUMEX) 0.25 mg/mL infusion  1 mg/hr IntraVENous CONTINUOUS  
 milrinone (PRIMACOR) 20 MG/100 ML D5W infusion  0.375 mcg/kg/min IntraVENous CONTINUOUS  
 tamsulosin (FLOMAX) capsule 0.8 mg  0.8 mg Oral DAILY  potassium chloride SR (KLOR-CON 10) tablet 20 mEq  20 mEq Oral TID  allopurinol (ZYLOPRIM) tablet 100 mg  100 mg Oral DAILY No Known Allergies Objective: 
Vitals:   
Vitals:  
 10/26/19 1130 10/26/19 1145 10/26/19 1200 10/26/19 1215 BP: 97/70 (!) 86/69 (!) 76/75 99/71 Pulse: 84 84 82 79 Resp: 20 28 26 27 Temp:   97.9 °F (36.6 °C) SpO2: 96% 92% 93% 94% Weight:      
 
Intake and Output: 
10/26 0701 - 10/26 1900 In: 409.5 [P.O.:240; I.V.:169.5] Out: 1040 [QEZIH:3064] 10/24 1901 - 10/26 0700 In: 937.9 [P.O.:620; I.V.:317.9] Out: 2910 [Urine:2910] Physical Examination: 
Pt intubated    No 
General: NAD,Conversant Neck:  Supple, no mass Resp:  Lungs few dependent rales CV:  RRR,  no murmur or rub, (+) LE edema GI:  Soft, NT, + Bowel sounds Neurologic:  Non focal 
Psych:             AAO x 3 appropriate affect Skin:  No Rash :  neal [x]    High complexity decision making was performed 
[]    Patient is at high-risk of decompensation with multiple organ involvement Lab Data Personally Reviewed: I have reviewed all the pertinent labs, microbiology data and radiology studies during assessment. Recent Labs 10/26/19 
1035 10/26/19 
0409 10/25/19 
1456 NA  --  124* 128* K 3.7 3.1* 3.4*  
CL  --  87* 90* CO2  --  28 29 GLU  --  259* 110* BUN  --  30* 38* CREA  --  1.86* 2.18* CA  --  8.2* 8.6 MG 2.1  --   --   
ALB  --  2.5* 2.8* SGOT  --  90* 109* ALT  --  138* 156* INR  --   --  1.6* Recent Labs 10/26/19 
0409 10/25/19 
1456 WBC 4.2 3.9* HGB 9.8* 10.1* HCT 30.2* 31.5*  175 No results found for: SDES Lab Results Component Value Date/Time Culture result: NO GROWTH AFTER 9 HOURS 10/25/2019 07:14 PM  
 Culture result: ENTEROBACTER CLOACAE (A) 06/26/2019 12:25 PM  
 Culture result: NO GROWTH 1 DAY 06/13/2019 10:44 AM  
 Culture result: ENTEROBACTER CLOACAE (A) 06/04/2019 04:27 AM  
 Culture result: NO GROWTH 5 DAYS 06/04/2019 04:09 AM  
 
Recent Results (from the past 24 hour(s)) PROCALCITONIN Collection Time: 10/25/19  2:56 PM  
Result Value Ref Range Procalcitonin 0.2 ng/mL METABOLIC PANEL, COMPREHENSIVE Collection Time: 10/25/19  2:56 PM  
Result Value Ref Range Sodium 128 (L) 136 - 145 mmol/L Potassium 3.4 (L) 3.5 - 5.1 mmol/L Chloride 90 (L) 97 - 108 mmol/L  
 CO2 29 21 - 32 mmol/L Anion gap 9 5 - 15 mmol/L Glucose 110 (H) 65 - 100 mg/dL BUN 38 (H) 6 - 20 MG/DL Creatinine 2.18 (H) 0.70 - 1.30 MG/DL  
 BUN/Creatinine ratio 17 12 - 20 GFR est AA 37 (L) >60 ml/min/1.73m2 GFR est non-AA 30 (L) >60 ml/min/1.73m2 Calcium 8.6 8.5 - 10.1 MG/DL Bilirubin, total 1.6 (H) 0.2 - 1.0 MG/DL  
 ALT (SGPT) 156 (H) 12 - 78 U/L  
 AST (SGOT) 109 (H) 15 - 37 U/L Alk. phosphatase 153 (H) 45 - 117 U/L Protein, total 5.9 (L) 6.4 - 8.2 g/dL Albumin 2.8 (L) 3.5 - 5.0 g/dL Globulin 3.1 2.0 - 4.0 g/dL A-G Ratio 0.9 (L) 1.1 - 2.2    
CBC W/O DIFF Collection Time: 10/25/19  2:56 PM  
Result Value Ref Range WBC 3.9 (L) 4.1 - 11.1 K/uL  
 RBC 3.47 (L) 4.10 - 5.70 M/uL  
 HGB 10.1 (L) 12.1 - 17.0 g/dL HCT 31.5 (L) 36.6 - 50.3 % MCV 90.8 80.0 - 99.0 FL  
 MCH 29.1 26.0 - 34.0 PG  
 MCHC 32.1 30.0 - 36.5 g/dL  
 RDW 16.2 (H) 11.5 - 14.5 % PLATELET 055 638 - 583 K/uL MPV 10.5 8.9 - 12.9 FL  
 NRBC 0.0 0  WBC ABSOLUTE NRBC 0.00 0.00 - 0.01 K/uL HEMOGLOBIN A1C WITH EAG Collection Time: 10/25/19  2:56 PM  
Result Value Ref Range Hemoglobin A1c 6.6 (H) 4.2 - 6.3 % Est. average glucose 143 mg/dL CK  
 Collection Time: 10/25/19  2:56 PM  
Result Value Ref Range CK 51 39 - 308 U/L  
TROPONIN I Collection Time: 10/25/19  2:56 PM  
Result Value Ref Range Troponin-I, Qt. <0.05 <0.05 ng/mL PROTHROMBIN TIME + INR Collection Time: 10/25/19  2:56 PM  
Result Value Ref Range INR 1.6 (H) 0.9 - 1.1 Prothrombin time 15.8 (H) 9.0 - 11.1 sec TYPE & SCREEN Collection Time: 10/25/19  2:56 PM  
Result Value Ref Range Crossmatch Expiration 10/28/2019 ABO/Rh(D) O POSITIVE Antibody screen NEG   
NT-PRO BNP Collection Time: 10/25/19  2:56 PM  
Result Value Ref Range NT pro-BNP 12,272 (H) <125 PG/ML  
CRP, HIGH SENSITIVITY Collection Time: 10/25/19  2:56 PM  
Result Value Ref Range CRP, High sensitivity >9.5 mg/L PREALBUMIN Collection Time: 10/25/19  2:56 PM  
Result Value Ref Range Prealbumin 13.8 (L) 20.0 - 40.0 mg/dL LD Collection Time: 10/25/19  2:56 PM  
Result Value Ref Range  (H) 85 - 241 U/L  
PSA SCREENING (SCREENING) Collection Time: 10/25/19  2:56 PM  
Result Value Ref Range Prostate Specific Ag 0.3 0.01 - 4.0 ng/mL T3, FREE Collection Time: 10/25/19  2:56 PM  
Result Value Ref Range Free Triiodothyronine (T3) 1.3 (L) 2.2 - 4.0 pg/mL URIC ACID Collection Time: 10/25/19  2:56 PM  
Result Value Ref Range Uric acid 10.6 (H) 3.5 - 7.2 MG/DL  
IRON PROFILE Collection Time: 10/25/19  2:56 PM  
Result Value Ref Range Iron 28 (L) 35 - 150 ug/dL TIBC 324 250 - 450 ug/dL Iron % saturation 9 (L) 20 - 50 % FERRITIN Collection Time: 10/25/19  2:56 PM  
Result Value Ref Range Ferritin 100 26 - 388 NG/ML  
POC G3 - PUL Collection Time: 10/25/19  4:12 PM  
Result Value Ref Range pH (POC) 7.518 (H) 7.35 - 7.45    
 pCO2 (POC) 32.5 (L) 35.0 - 45.0 MMHG  
 pO2 (POC) 70 (L) 80 - 100 MMHG  
 HCO3 (POC) 26.4 (H) 22 - 26 MMOL/L  
 sO2 (POC) 96 92 - 97 % Base excess (POC) 4 mmol/L Site LEFT RADIAL  Device: ROOM AIR    
 Allens test (POC) YES Specimen type (POC) ARTERIAL Total resp. rate 18 URINALYSIS W/MICROSCOPIC Collection Time: 10/25/19  5:30 PM  
Result Value Ref Range Color YELLOW/STRAW Appearance CLEAR CLEAR Specific gravity <1.005 1.003 - 1.030  
 pH (UA) 6.0 5.0 - 8.0 Protein NEGATIVE  NEG mg/dL Glucose NEGATIVE  NEG mg/dL Ketone NEGATIVE  NEG mg/dL Bilirubin NEGATIVE  NEG Blood SMALL (A) NEG Urobilinogen 1.0 0.2 - 1.0 EU/dL Nitrites NEGATIVE  NEG Leukocyte Esterase NEGATIVE  NEG    
 WBC 0-4 0 - 4 /hpf  
 RBC 0-5 0 - 5 /hpf Epithelial cells FEW FEW /lpf Bacteria NEGATIVE  NEG /hpf Hyaline cast 0-2 0 - 5 /lpf URINE CULTURE HOLD SAMPLE Collection Time: 10/25/19  5:30 PM  
Result Value Ref Range Urine culture hold URINE ON HOLD IN MICROBIOLOGY DEPT FOR 3 DAYS. IF UNPRESERVED URINE IS SUBMITTED, IT CANNOT BE USED FOR ADDITIONAL TESTING AFTER 24 HRS, RECOLLECTION WILL BE REQUIRED. DRUG SCREEN, URINE Collection Time: 10/25/19  5:30 PM  
Result Value Ref Range AMPHETAMINES NEGATIVE  NEG    
 BARBITURATES NEGATIVE  NEG BENZODIAZEPINES NEGATIVE  NEG    
 COCAINE NEGATIVE  NEG METHADONE NEGATIVE  NEG    
 OPIATES NEGATIVE  NEG    
 PCP(PHENCYCLIDINE) NEGATIVE  NEG    
 THC (TH-CANNABINOL) NEGATIVE  NEG Drug screen comment (NOTE) DUPLEX CAROTID BILATERAL Collection Time: 10/25/19  5:34 PM  
Result Value Ref Range Right cca dist sys 46.2 cm/s Right CCA dist merritt 16.6 cm/s Right CCA prox sys 43.0 cm/s Right CCA prox merritt 10.6 cm/s Right eca sys 42.9 cm/s RIGHT EXTERNAL CAROTID ARTERY D 2.35 cm/s Right ICA dist sys 27.6 cm/s Right ICA dist merritt 11.1 cm/s Right ICA mid sys 44.8 cm/s Right ICA mid merritt 15.1 cm/s Right ICA prox sys 72.7 cm/s Right ICA prox merritt 33.4 cm/s Right vertebral sys 36.3 cm/s RIGHT VERTEBRAL ARTERY D 12.77 cm/s Right ICA/CCA sys 1.6 Left CCA dist sys 53.5 cm/s Left CCA dist merritt 16.8 cm/s Left CCA prox sys 60.5 cm/s Left CCA prox merritt 20.3 cm/s Left ECA sys 54.4 cm/s LEFT EXTERNAL CAROTID ARTERY D 9.86 cm/s Left ICA dist sys 32.0 cm/s Left ICA dist merritt 12.2 cm/s Left ICA mid sys 57.2 cm/s Left ICA mid merritt 23.0 cm/s Left ICA prox sys 58.5 cm/s Left ICA prox merritt 26.5 cm/s Left vertebral sys 28.6 cm/s LEFT VERTEBRAL ARTERY D 5.75 cm/s Left ICA/CCA sys 1.09 EKG, 12 LEAD, INITIAL Collection Time: 10/25/19  6:20 PM  
Result Value Ref Range Ventricular Rate 72 BPM  
 Atrial Rate 72 BPM  
 P-R Interval 86 ms QRS Duration 116 ms  
 Q-T Interval 506 ms QTC Calculation (Bezet) 554 ms Calculated P Axis 9 degrees Calculated R Axis -68 degrees Calculated T Axis 10 degrees Diagnosis Sinus rhythm with short WY with occasional premature ventricular complexes  
and fusion complexes Left axis deviation Inferior infarct , new Anterolateral infarct , possibly acute Prolonged QT 
** ACUTE MI ** When compared with ECG of 26-JUN-2019 11:39, 
Significant changes have occurred CULTURE, BLOOD, PAIRED Collection Time: 10/25/19  7:14 PM  
Result Value Ref Range Special Requests: NO SPECIAL REQUESTS Culture result: NO GROWTH AFTER 9 HOURS    
LACTIC ACID Collection Time: 10/25/19  7:28 PM  
Result Value Ref Range Lactic acid 1.7 0.4 - 2.0 MMOL/L  
HEPATITIS C AB Collection Time: 10/25/19  7:39 PM  
Result Value Ref Range Hep C  virus Ab Interp. NONREACTIVE NR Hep C  virus Ab comment Method used is SpiceCSM HIV 1/2 AG/AB, 4TH GENERATION,W RFLX CONFIRM Collection Time: 10/25/19  7:39 PM  
Result Value Ref Range HIV 1/2 Interpretation NONREACTIVE NR    
 HIV 1/2 result comment SEE NOTE T4, FREE Collection Time: 10/25/19  7:39 PM  
Result Value Ref Range  T4, Free 1.7 (H) 0.8 - 1.5 NG/DL  
TSH 3RD GENERATION  
 Collection Time: 10/25/19  7:39 PM  
Result Value Ref Range TSH 2.40 0.36 - 3.74 uIU/mL SAMPLES BEING HELD Collection Time: 10/25/19  7:54 PM  
Result Value Ref Range SAMPLES BEING HELD 3PST, 1LAV, 1RED COMMENT Add-on orders for these samples will be processed based on acceptable specimen integrity and analyte stability, which may vary by analyte. METABOLIC PANEL, COMPREHENSIVE Collection Time: 10/26/19  4:09 AM  
Result Value Ref Range Sodium 124 (L) 136 - 145 mmol/L Potassium 3.1 (L) 3.5 - 5.1 mmol/L Chloride 87 (L) 97 - 108 mmol/L  
 CO2 28 21 - 32 mmol/L Anion gap 9 5 - 15 mmol/L Glucose 259 (H) 65 - 100 mg/dL BUN 30 (H) 6 - 20 MG/DL Creatinine 1.86 (H) 0.70 - 1.30 MG/DL  
 BUN/Creatinine ratio 16 12 - 20 GFR est AA 44 (L) >60 ml/min/1.73m2 GFR est non-AA 36 (L) >60 ml/min/1.73m2 Calcium 8.2 (L) 8.5 - 10.1 MG/DL Bilirubin, total 1.4 (H) 0.2 - 1.0 MG/DL  
 ALT (SGPT) 138 (H) 12 - 78 U/L  
 AST (SGOT) 90 (H) 15 - 37 U/L Alk. phosphatase 137 (H) 45 - 117 U/L Protein, total 5.8 (L) 6.4 - 8.2 g/dL Albumin 2.5 (L) 3.5 - 5.0 g/dL Globulin 3.3 2.0 - 4.0 g/dL A-G Ratio 0.8 (L) 1.1 - 2.2 LIPID PANEL Collection Time: 10/26/19  4:09 AM  
Result Value Ref Range LIPID PROFILE Cholesterol, total 90 <200 MG/DL Triglyceride 64 <150 MG/DL  
 HDL Cholesterol 21 MG/DL  
 LDL, calculated 56.2 0 - 100 MG/DL VLDL, calculated 12.8 MG/DL  
 CHOL/HDL Ratio 4.3 0.0 - 5.0    
CBC W/O DIFF Collection Time: 10/26/19  4:09 AM  
Result Value Ref Range WBC 4.2 4.1 - 11.1 K/uL  
 RBC 3.35 (L) 4.10 - 5.70 M/uL HGB 9.8 (L) 12.1 - 17.0 g/dL HCT 30.2 (L) 36.6 - 50.3 % MCV 90.1 80.0 - 99.0 FL  
 MCH 29.3 26.0 - 34.0 PG  
 MCHC 32.5 30.0 - 36.5 g/dL  
 RDW 16.4 (H) 11.5 - 14.5 % PLATELET 458 296 - 188 K/uL MPV 9.8 8.9 - 12.9 FL  
 NRBC 0.0 0  WBC ABSOLUTE NRBC 0.00 0.00 - 0.01 K/uL NT-PRO BNP  
 Collection Time: 10/26/19  4:09 AM  
Result Value Ref Range NT pro-BNP 10,480 (H) <125 PG/ML  
POTASSIUM Collection Time: 10/26/19 10:35 AM  
Result Value Ref Range Potassium 3.7 3.5 - 5.1 mmol/L MAGNESIUM Collection Time: 10/26/19 10:35 AM  
Result Value Ref Range Magnesium 2.1 1.6 - 2.4 mg/dL VON WILLEBRAND PANEL Collection Time: 10/26/19 11:30 AM  
Result Value Ref Range Factor VIII Activity PENDING %  
 von Willebrand Factor (vWF) Ag PENDING %  
 vWF Activity PENDING % POC VENOUS BLOOD GAS Collection Time: 10/26/19  1:28 PM  
Result Value Ref Range Device: ROOM AIR    
 FIO2 (POC) 21 %  
 pH, venous (POC) 7.443 (H) 7.32 - 7.42    
 pCO2, venous (POC) 43.9 41 - 51 MMHG  
 pO2, venous (POC) 23 (L) 25 - 40 mmHg HCO3, venous (POC) 30.0 (H) 23.0 - 28.0 MMOL/L  
 sO2, venous (POC) 41 (L) 65 - 88 % Base excess, venous (POC) 6 mmol/L Allens test (POC) N/A Total resp. rate 20 Site [facility] Specimen type (POC) MIXED VENOUS Total time spent with patient:  xxx   min. Care Plan discussed with: 
Patient Family RN Consulting Physician [facility],      
 
I have reviewed the flowsheets. Chart and Pertinent Notes have been reviewed. No change in PMH ,family and social history from Consult note.  
 
 
Anthony Yoon MD

## 2019-10-27 NOTE — PROGRESS NOTES
Problem: Mobility Impaired (Adult and Pediatric) Goal: *Acute Goals and Plan of Care (Insert Text) Description FUNCTIONAL STATUS PRIOR TO ADMISSION: Patient was modified independent using a single point cane for functional mobility. Patient reports an increasingly sedentary lifestyle 2* fatigue and SOB/dyspnea. Retired (so is his wife). Patient reports x 3 falls within the last couple of weeks. Patient is wearing either nasal cannula or CPAP at night. LVAD work-up has been initiated. HOME SUPPORT PRIOR TO ADMISSION: The patient lived with his wife, but did not require physical assistance. Physical Therapy Goals Initiated 10/27/2019 1. Patient will move from supine to sit and sit to supine, scoot up and down and roll side to side in bed with independence within 7 days. 2.  Patient will perform sit to/from stand with supervision/set-up within 7 days. 3.  Patient will ambulate 150 feet with least restrictive assistive device and supervision/set-up within 7 days. 4.  Patient will ascend/descend 4 stairs with  handrail(s) with supervision/set-up within 7 days for functional strengthening and community reintegration. Amber Rich 5.  Patient will verbally and functionally recall 3/3 sternal precautions within 7 days in preparation for LVAD implantation. 6.  Patient will perform a mock power exchange for power module to/from battery with supervision/set-up within 7 days in preparation for LVAD implantation. Outcome: Progressing Towards Goal 
 
PHYSICAL THERAPY EVALUATION Patient: Otis Dyson (75 y.o. male) Date: 10/27/2019 Primary Diagnosis: Acute decompensated heart failure ([facility] [address] [I50.9] Precautions:  Fall(x 3 in 1-2 weeks), LVAD work-up ASSESSMENT Based on the objective data described below, the patient presents with impaired cardiopulmonary tolerance, swan gem catheter, quick fatigue onset, x 3 falls at home within the last 2 weeks (LE weakness), general debility, and decreased activity tolerance. This therapist noted dyspnea with just conversation prior to mobility assessment. Patient only able to tolerate bed > chair transfer and then static standing x 45 seconds prior to requesting a seated rest break. Patient would like to try a LE ergometer tomorrow since he is largely restricted by lines/leads in CCU at this time. ** LVAD work-up ** 
 
Current Level of Function Impacting Discharge (mobility/balance): Quick fatigue onset - activity tolerance only for functional transfers at this time Functional Outcome Measure: The patient was unable to complete a TUG assessment 2* fatigue and general debility (+ swan gem catheter) Other factors to consider for discharge: Potential LVAD implantation candidate, Wife is retired Patient will benefit from skilled therapy intervention to address the above noted impairments. PLAN : 
Recommendations and Planned Interventions: bed mobility training, transfer training, gait training, therapeutic exercises, edema management/control, patient and family training/education and therapeutic activities Frequency/Duration: Patient will be followed by physical therapy:  5 times a week to address goals. Recommendation for discharge: (in order for the patient to meet his/her long term goals) To be determined: Pending LVAD? This discharge recommendation: A follow-up discussion with the attending provider and/or case management is planned IF patient discharges home will need the following DME: to be determined (TBD) SUBJECTIVE:  
Patient stated That was tiresome.  OBJECTIVE DATA SUMMARY:  
HISTORY:   
Past Medical History:  
Diagnosis Date Degenerative disc disease, lumbar Heart failure ([address] High cholesterol Hypertension Paroxysmal atrial fibrillation ([address] 4/2/2019 Spinal stenosis Past Surgical History:  
Procedure Laterality Date HX APPENDECTOMY HX CORONARY ARTERY BYPASS GRAFT    
 triple HX HERNIA REPAIR    
 HX IMPLANTABLE CARDIOVERTER DEFIBRILLATOR    
 DC CARDIOVERSION ELECTIVE ARRHYTHMIA EXTERNAL N/A 6/10/2019 EP CARDIOVERSION performed by Magen Cash MD at [address], [facility] CATH LAB  
 DC CARDIOVERSION ELECTIVE ARRHYTHMIA EXTERNAL N/A 6/18/2019 EP CARDIOVERSION performed by Aminta Stout MD at [address], [facility] CATH LAB  
 DC INSJ/RPLCMT PERM DFB W/TRNSVNS LDS 1/DUAL [facility] N/A 6/21/2019 INSERT ICD BIV MULTI [facility] CATH LAB  
 DC TCAT IMPL WRLS P-ART PRS SNR L-T HEMODYN MNTR N/A 9/18/2019 IMPLANT HEART FAILURE MONITORING DEVICE performed by Ivan Motley MD at [address] [place] [address], [facility]  CATH LAB Personal factors and/or comorbidities impacting plan of care: [facility] Home Situation Home Environment: Private residence # Steps to Enter: 0 One/Two Story Residence: One story Living Alone: No 
Support Systems: Spouse/Significant Other/Partner, Child(vanessa) Patient Expects to be Discharged to[de-identified] Unknown Current DME Used/Available at Home: Walker, rolling, Cane, straight, CPAP(Nocturnal nasal cannula) EXAMINATION/PRESENTATION/DECISION MAKING:  
Critical Behavior: 
Neurologic State: Alert, Appropriate for age Orientation Level: Appropriate for age, Oriented X4 Cognition: Appropriate decision making, Appropriate for age attention/concentration, Appropriate safety awareness, Follows commands Safety/Judgement: Awareness of environment, Fall prevention, Insight into deficits Hearing: Auditory Auditory Impairment: None Skin:  Sudie Gianotti Edema: Trace B LE edema (greater distally) Range Of Motion: 
AROM: Generally decreased, functional 
  
  
  
  
  
  
  
Strength:   
Strength: Generally decreased, functional 
  
  
  
  
  
  
Tone & Sensation:  
Tone: Normal 
  
  
  
  
Sensation: Intact Coordination: 
Coordination: Within functional limits Vision:  
  
Functional Mobility: Bed Mobility: 
  
Supine to Sit: Supervision; Additional time(+ Assist for line management) Scooting: Supervision; Additional time(+ Assist for line management) Transfers: 
Sit to Stand: Contact guard assistance; Additional time Stand to Sit: Contact guard assistance; Additional time Bed to Chair: Contact guard assistance; Additional time(Modified Simone RPE: 6/10) Balance:  
Sitting: Intact; Without support Standing: Impaired; Without support Standing - Static: Fair Standing - Dynamic : Fair Functional Measure: 
Timed up and go: 
 
Timed Get Up And Go Test: (Unable to complete this date) < than 10 seconds=Normal  Greater then 13.5 seconds (in elderly)=Increased fall risk Nell FRENCH. Predicting the probability for falls in community dwelling older adults using the Timed Up and Go Test. Phys Ther. 2000;80:896-903. Based on the above components, the patient evaluation is determined to be of the following complexity level: MEDIUM Activity Tolerance:  
Fair, desaturates with exertion and requires oxygen, requires frequent rest breaks and observed SOB with activity SvO2 dropping to 34% with bed > chair transfer Please refer to the flowsheet for vital signs taken during this treatment. After treatment patient left in no apparent distress:  
Sitting in chair and Call bell within reach COMMUNICATION/EDUCATION:  
The patients plan of care was discussed with: Registered Nurse, Physician and Rehabilitation Attendant. Fall prevention education was provided and the patient/caregiver indicated understanding., Patient/family have participated as able in goal setting and plan of care. and Patient/family agree to work toward stated goals and plan of care. Thank you for this referral. 
Gerianne Osgood, PT, DPT Time Calculation: 22 mins

## 2019-10-27 NOTE — PROGRESS NOTES
Leeann Note DOS:  10/27/2019 REFERRING PROVIDER:  Liz Renteria MD 
PRIMARY CARE PHYSICIAN: Audi Palacios MD 
PRIMARY CARDIOLOGIST: Liz Renteria MD 
 
 
 
No chief complaint on file. HPI: Otis Dyson is a 71y.o. year old pleasant white male with a history of HTN, HLD, JOSE, CAD s/p cardiac arrest VFib s/p CABG (2011) c/b sternal would infection and sternectomy, ischemic cardiomyopathy LVEF 15-20%, s/p ICD and with LBBB. He was admitted with acute on chronic systolic heart failure with massive volume overload > 20 lbs, in the setting of atrial fibrillation s/p failed DCCV and underwent DCCV and RHC on 6/18. S/p BiVICD on 6/21/19 with Dr. Carolyn Roldan. He was discharged home home on IV milrinone on 6/26/19. He has been followed closely by Dr. Jim Ruelas and the [facility]. Mr. Tommie Knowles returns to clinic for follow up with his wife. His dyspnea has progressed to the point he is sitting in a chair most of the day. He is unable to perform simple ADLs without limiting dyspnea. His appetite is down. He underwent a sleep study and is using CPAP but has difficulty due to frequent nocturia. He denies presyncope or syncope. He has had a few falls due to generalized weakness but did not hit his head nor lose consciousness. Recent labs reveal stable creatinine from 1.9 - 2.1. He is currently taking torsemide 60 mg bid. His wife has noticed an odor to his urine. He denies fever/chills but admits to feeling cold all the time. Recent Events: 
Diuresing well Less dyspnea On thickened liquids - no signs of aspiration Improved renal function - Cr 1.61 IMPRESSION/PLAN: 
1. Acute on chronic systolic heart failure, admitted with cardiogenic shock 
 ICM, Stage D, NYHA Class IV, LVEF 16-20%, s/p CRT on 6/21/19 CRT non-responder Intolerant to RAASi d/t renal dysfunction Continue carvedilol to 3.125 mg BID d/t RV failure Continue IV milrinone to 0.3 mcgs/kg/min Reduce  IV bumex infusion to 0.5 mg/hour Continue spironolactone Follow renal function and electrolytes closely Follow hemodynamics closely 2. History of VF arrest - s/p AICD No recent shocks 3. CAD s/p CABG in 2010 Recommend [facility] 4. PAF s/p DCCV 6/18/19 Hold eliquis Continue IV heparin for CVA prophylaxis Continue amiodarone 5. Chronic Kidney Disease 3 - single kidney Followed closely by Dr. Fiona Sanders Consult nephrology 6. History of Urinary Retention UA and urine culture Lizama cath placed 7. JOSE on CPAP Encourage use of home CPAP 
 
8. History of Sternal wound infection requiring sternectomy Stable CV surgery aware - not a contraindication for LVAD 9. Anemia, iron deficiency Iron sat 9% 10. H/o DVT IV heparin 11. Migraines 
 stable 12. H/o tobacco abuse Counseling - continued abstinence 13. Depression On lexapro - consider increasing his dose 14. Anorexia - likely multifactorial (depression, congestive hepatopathy) Prealbumin 13.8 Nutritional supplements 15. Mediastinal Lymphadenopathy Concerning for lymphoma/lung cancer Plan trans-bronchial biopsy next week 16. Vocal Cord Paralysis Speech therapy consult Aspiration Precautions Thickened liquids Thank you for letting us see him with you, Mounika Singh MD, Carmenza Jazzy Chief of Cardiology, [facility] Medical Director Calos Rueda [address], [address] [place] [address] Office [phone] Fax [phone] CARDIAC EVALUATION 
ECHO (5/31/19) LVEF 21-25%, severely dilated LA, moderately dilated RA 
ECHO (6/24/19) LVEF 16-20%, Severely dilated LV, trace MR, trace TR 
 
EKG (6/12/19) atrial flutter with occasional PVCs, LBBB QRS 158ms EKG (6/26/19) Atrial fibrillation with premature ventricular or aberrantly conducted complexes, Left bundle branch block, rate 86, , QTc [facility] not in epic Review of Systems:    
General:  Denies fever, fatigue HEENT: Denies angioedema, epistaxis; complains of hoarseness Pulmonary: Denies cough, wheeze, hemoptysis Cardiac: Admits to DOUGLAS, edema, orthopnea,  but denies exertional chest pain, palpitations, syncope, near syncope, bleeding, claudication, AICD firing GI:  Admits to abdominal bloating, anorexia but denies change in bowel habits, or black or bloody stools :  Admits to nocturia Musculo: Denies myalgias, arthralgias, + BLE edema Neuro: Denies headaches, CVA/TIA sx Skin:  Denies rash Heme: Denies bruising, bleeding, lymphadenopathy Psych: Admits to depression Physical Exam:  
 
Visit Vitals BP (!) 88/57 Pulse 82 Temp 98.8 °F (37.1 °C) Resp 29 Wt 158 lb 11.7 oz (72 kg) SpO2 (!) 82% BMI 20.38 kg/m² Hemodynamics: 
 CO: CO (l/min): 5.8 l/min CI: CI (l/min/m2): 2 l/min/m2 CVP: CVP (mmHg): 4 mmHg (10/27/19 0900) SVR: SVR (dyne*sec)/cm5: 1320 (dyne*sec)/cm5 (10/27/19 0500) PAP Systolic: PAP Systolic: 51 (55/59/90 5794) PAP Diastolic: PAP Diastolic: 21 (65/04/31 1785) PVR:   
 SV02:   
 SCV02: SCVO2 (%): 48 % (10/27/19 0900) General:  AAOx3 cooperative, mild distress. HEENT:  Atraumatic. Pink and moist.  Anicteric sclerae. Neck:   Supple, no adenopoathy, PA cath in [facility] Lungs:  Diminished bilateral breath sounds at the bases, No wheezing/rhonchi/rales. Heart:   Median sternotomy scar, sternectomy, AICD in left upper chest wall: Regular rhythm, S1, S2 present, 2/6 murmur, no rubs, S3 gallop. No carotid bruits. Abdomen:  Distended, non-tender. + Bowel sounds. No bruits. Extremities:  +2 bilateral pitting LE edema, no clubbing, no cyanosis. No calf tenderness Neurologic:  Grossly intact. Alert and oriented X 3. No acute neurological distress. Skin:   intact, no wounds, scattered ecchymosis and bruising, no rashes Psych:  Good insight. Depressed History: 
Past Medical History: Diagnosis Date  Degenerative disc disease, lumbar  Heart failure ([facility] [address]  High cholesterol  Hypertension  Paroxysmal atrial fibrillation ([facility] [address] 2019  Spinal stenosis Past Surgical History:  
Procedure Laterality Date  HX APPENDECTOMY  HX CORONARY ARTERY BYPASS GRAFT    
 triple  HX HERNIA REPAIR    
 HX IMPLANTABLE CARDIOVERTER DEFIBRILLATOR  LA CARDIOVERSION ELECTIVE ARRHYTHMIA EXTERNAL N/A 6/10/2019 EP CARDIOVERSION [facility] [place] CATH LAB  LA CARDIOVERSION ELECTIVE ARRHYTHMIA EXTERNAL N/A 2019 EP CARDIOVERSION performed by Ashley Fowler MD at [facility] [place] [address], [address] CATH LAB  LA INSJ/RPLCMT PERM DFB W/TRNSVNS LDS 1/DUAL CHMBR N/A 2019 INSERT ICD BIV MULTI performed by Marissa Matos MD at [place] [address], [facility] CATH LAB  LA TCAT IMPL WRLS P-ART PRS SNR L-T HEMODYN MNTR N/A 2019 IMPLANT HEART FAILURE MONITORING DEVICE performed [place] [address] [address] CATH LAB Social History Socioeconomic History  Marital status:  Spouse name: Not on file  Number of children: Not on file  Years of education: Not on file  Highest education level: Not on file Occupational History  Not on file Social Needs  Financial resource strain: Not on file  Food insecurity:  
  Worry: Not on file Inability: Not on file  Transportation needs:  
  Medical: Not on file Non-medical: Not on file Tobacco Use  Smoking status: Former Smoker Last attempt to quit: 2010 Years since quittin.9  Smokeless tobacco: Never Used Substance and Sexual Activity  Alcohol use: Not Currently Comment: rarely  Drug use: Never  Sexual activity: Not on file Lifestyle  Physical activity:  
  Days per week: Not on file Minutes per session: Not on file  Stress: Not on file Relationships  Social connections:  
  Talks on phone: Not on file Gets together: Not on file Attends Yazidi service: Not on file Active member of club or organization: Not on file Attends meetings of clubs or organizations: Not on file Relationship status: Not on file  Intimate partner violence:  
  Fear of current or ex partner: Not on file Emotionally abused: Not on file Physically abused: Not on file Forced sexual activity: Not on file Other Topics Concern [address] Service Not Asked  Blood Transfusions Not Asked  Caffeine Concern Not Asked  Occupational Exposure Not Asked [employer] Hazards Not Asked  Sleep Concern Not Asked  Stress Concern Not Asked  Weight Concern Not Asked  Special Diet Not Asked  Back Care Not Asked  Exercise Not Asked  Bike Helmet Not Asked  Seat Belt Not Asked  Self-Exams Not Asked Social History Narrative  Not on file Family History Problem Relation Age of Onset  Lung Disease Mother  Hypertension Mother Ware Harder Arthritis-osteo Mother  Heart Disease Mother  Heart Disease Father  Diabetes Father Current Medications:  
Current Facility-Administered Medications Medication Dose Route Frequency Provider Last Rate Last Dose  potassium chloride SR (KLOR-CON 10) tablet 40 mEq  40 mEq Oral ONCE Mounika Altman MD      
 0.9% sodium chloride infusion  10 mL/hr IntraVENous CONTINUOUS Mounika Altman MD 10 mL/hr at 10/26/19 0622 10 mL/hr at 10/26/19 0622  
 amiodarone (CORDARONE) tablet 100 mg  100 mg Oral BID Win QUEZADA MD   100 mg at 10/27/19 0826  
 iron sucrose (VENOFER) 300 mg in 0.9% sodium chloride 250 mL IVPB  300 mg IntraVENous Q24H Marcy Mathias .7 mL/hr at 10/26/19 1432 300 mg at 10/26/19 1432  heparin 25,000 units in D5W 250 ml infusion  11-25 Units/kg/hr IntraVENous TITRATE Win QUEZADA MD 14.1 mL/hr at 10/27/19 0906 16 Units/kg/hr at 10/27/19 9175  alteplase (CATHFLO) 1 mg in sterile water (preservative free) 1 mL injection  1 mg InterCATHeter PRN Durga Potter MD      
 influenza vaccine 2019-20 (6 mos+)(PF) (FLUARIX/FLULAVAL/FLUZONE QUAD) injection 0.5 mL  0.5 mL IntraMUSCular PRIOR TO DISCHARGE Olegario Altman MD      
 sodium chloride (NS) flush 5-40 mL  5-40 mL IntraVENous Q8H Bart Herrera NP   10 mL at 10/27/19 7290  sodium chloride (NS) flush 5-40 mL  5-40 mL IntraVENous PRN Bart Herrera NP      
 aspirin delayed-release tablet 81 mg  81 mg Oral DAILY Bart Herrera NP   81 mg at 10/27/19 6442  spironolactone (ALDACTONE) tablet 25 mg  25 mg Oral DAILY Bart Herrera NP   25 mg at 10/27/19 0825  bumetanide (BUMEX) 0.25 mg/mL infusion  0.5 mg/hr IntraVENous CONTINUOUS Lili QUEZADA MD 4 mL/hr at 10/27/19 0744 1 mg/hr at 10/27/19 0744  
 milrinone (PRIMACOR) 20 MG/100 ML D5W infusion  0.3 mcg/kg/min IntraVENous CONTINUOUS Mounika Altman MD 7.9 mL/hr at 10/27/19 0909 0.3 mcg/kg/min at 10/27/19 3468  tamsulosin (FLOMAX) capsule 0.8 mg  0.8 mg Oral DAILY Logan Kincaid MD   0.8 mg at 10/27/19 0826  
 potassium chloride SR (KLOR-CON 10) tablet 20 mEq  20 mEq Oral TID Lili QUEZADA MD   20 mEq at 10/27/19 0826  
 allopurinol (ZYLOPRIM) tablet 100 mg  100 mg Oral DAILY Durga Potter MD   100 mg at 10/27/19 2009 Allergies: No Known Allergies Recent Labs:  
Labs Latest Ref Rng & Units 10/27/2019 10/26/2019 10/26/2019 10/26/2019 10/25/2019 WBC 4.1 - 11.1 K/uL 4.1 4.6 - 4.2 3. 9(L) RBC 4.10 - 5.70 M/uL 3.48(L) 3.39(L) - 3.35(L) 3.47(L) Hemoglobin 12.1 - 17.0 g/dL 10. 0(L) 9.7(L) - 9. 8(L) 10. 1(L) Hematocrit 36.6 - 50.3 % 31. 7(L) 30. 6(L) - 30. 2(L) 31. 5(L) MCV 80.0 - 99.0 FL 91.1 90.3 - 90.1 90.8 Platelets 173 - 579 K/uL 106(L) 167 - 156 175 Lymphocytes 12 - 49 % - 9(L) - - - Monocytes 5 - 13 % - 12 - - - Eosinophils 0 - 7 % - 1 - - - Basophils 0 - 1 % - 0 - - -  
 Albumin 3.5 - 5.0 g/dL 2. 4(L) - - 2. 5(L) 2. 8(L) Calcium 8.5 - 10.1 MG/DL 7. 9(L) - - 8. 2(L) 8.6 SGOT 15 - 37 U/L 58(H) - - 90(H) 109(H) Glucose 65 - 100 mg/dL 102(H) - - 259(H) 110(H) BUN 6 - 20 MG/DL 23(H) - - 30(H) 38(H) Creatinine 0.70 - 1.30 MG/DL 1.61(H) - - 1.86(H) 2.18(H) Sodium 136 - 145 mmol/L 132(L) - - 124(L) 128(L) Potassium 3.5 - 5.1 mmol/L 3.2(L) - 3.7 3. 1(L) 3.4(L) TSH 0.36 - 3.74 uIU/mL - - - - 2.40 PSA 0.01 - 4.0 ng/mL - - - - 0.3 LDH 85 - 241 U/L - - - - 284(H) Some recent data might be hidden Lab Results Component Value Date/Time WBC 4.1 10/27/2019 04:21 AM  
 HGB 10.0 (L) 10/27/2019 04:21 AM  
 HCT 31.7 (L) 10/27/2019 04:21 AM  
 PLATELET 370 (L) 17/39/8240 04:21 AM  
 MCV 91.1 10/27/2019 04:21 AM  
 
Lab Results Component Value Date/Time GFR est non-AA 43 (L) 10/27/2019 04:21 AM  
 GFR est AA 52 (L) 10/27/2019 04:21 AM  
 Creatinine 1.61 (H) 10/27/2019 04:21 AM  
 BUN 23 (H) 10/27/2019 04:21 AM  
 Sodium 132 (L) 10/27/2019 04:21 AM  
 Potassium 3.2 (L) 10/27/2019 04:21 AM  
 Chloride 96 (L) 10/27/2019 04:21 AM  
 CO2 29 10/27/2019 04:21 AM  
 Magnesium 2.1 10/27/2019 04:21 AM  
 Phosphorus 2.4 (L) 06/04/2019 04:09 AM  
 
Lab Results Component Value Date/Time TSH 2.40 10/25/2019 07:39 PM  
 T4, Free 1.7 (H) 10/25/2019 07:39 PM  
  
Lab Results Component Value Date/Time  Sodium 132 (L) 10/27/2019 04:21 AM  
 Potassium 3.2 (L) 10/27/2019 04:21 AM  
 Chloride 96 (L) 10/27/2019 04:21 AM  
 CO2 29 10/27/2019 04:21 AM  
 Anion gap 7 10/27/2019 04:21 AM  
 Glucose 102 (H) 10/27/2019 04:21 AM  
 BUN 23 (H) 10/27/2019 04:21 AM  
 Creatinine 1.61 (H) 10/27/2019 04:21 AM  
 BUN/Creatinine ratio 14 10/27/2019 04:21 AM  
 GFR est AA 52 (L) 10/27/2019 04:21 AM  
 GFR est non-AA 43 (L) 10/27/2019 04:21 AM  
 Calcium 7.9 (L) 10/27/2019 04:21 AM  
 Bilirubin, total 1.3 (H) 10/27/2019 04:21 AM  
 ALT (SGPT) 114 (H) 10/27/2019 04:21 AM  
 AST (SGOT) 58 (H) 10/27/2019 04:21 AM  
 Alk. phosphatase 131 (H) 10/27/2019 04:21 AM  
 Protein, total 5.8 (L) 10/27/2019 04:21 AM  
 Albumin 2.4 (L) 10/27/2019 04:21 AM  
 Globulin 3.4 10/27/2019 04:21 AM  
 A-G Ratio 0.7 (L) 10/27/2019 04:21 AM  
  
Lab Results Component Value Date/Time Hemoglobin A1c 6.6 (H) 10/25/2019 02:56 PM  
  
 
 
 
Thank you for letting us see him with you, Mounika Gutierrez MD, Rajeev Vargas Chief of Cardiology, [facility] Medical Director Calos Rueda [address], [address] [place], [facility] [address] Office [phone] Fax [phone]

## 2019-10-27 NOTE — PROGRESS NOTES
0730 - Bedside and Verbal shift change report given to Salas Larson RN (oncoming nurse) by Arabella Parra RN (offgoing nurse). Report included the following information SBAR, MAR, Recent Results and Cardiac Rhythm paced. Physical therapy in to see patient. Patient ambulated to chair. Echo tech at bedside for ordered echocardiogram. 
 
1930 - Bedside and Verbal shift change report given to Juan Luis Clark RN (oncoming nurse) by Salas Larson RN (offgoing nurse). Report included the following information SBAR, MAR, Recent Results and Cardiac Rhythm paced.

## 2019-10-27 NOTE — INTERDISCIPLINARY ROUNDS
IDR/SLIDR Summary Patient: Matt Bailey MRN: [id number]    Age: 71 y.o. YOB: 1950 Room/Bed: [address] [place] [address] Admit Diagnosis: Acute decompensated heart failure (HCC) [I50.9]  Principal Diagnosis: <principal problem not specified>  
Goals: Decreased dyspnea with activity Readmission: YES  Quality Measure: CHF VTE Prophylaxis: Chemical 
Influenza Vaccine screening completed? YES Pneumococcal Vaccine screening completed? YES Mobility needs: No   Nutrition plan:No 
Consults:Case Management Financial concerns:No  Escalated to CM? NO 
RRAT Score: 17   Interventions:H2H Testing due for pt today? NO 
LOS: 2 days Expected length of stay TBD days Discharge plan: TBD   PCP: Hal Barton MD 
Transportation needs: No   
Days before discharge:two or more days before discharge Discharge disposition: Home Signed:  
 
Dang Cotton 10/27/2019 
1:37 AM

## 2019-10-27 NOTE — PROGRESS NOTES
[facility] 
 [address] [facility], [address] [facility], [facility] Phone: [phone]   [phone]   
  
Nephrology Progress Note Sona Kiser     1950     [id number] Date of Admission : 10/25/2019 
10/27/19 CC:  Follow up for JUAN J, CKD, Hyponatremia Assessment and Plan JUAN J/CKD Stage IV : 
- Creatinine improving some 
- he has element of CRS and post renal problems affecting renal function  
- continue Bumex infusion and inotropes Hyponatremia - much better today after fluid restriction; patient is very compliant with restriction 
-was drinking about 3 liters per day previously -now restricted to 1000 cc/day, plus a few ice chips Hoarseness, vocal cord paralysis, mediastinal adenopathy 
-malignancy a concern; pulm to be consulted for bronch/biopsy 
  
Left renal Atrophy : 
- Ordered Nuclear renal perfusion scan to assess flow and function  
  
BPH w/ urinary retention - Improved PVR after starting Flomax  
- trial of Higher dose of Flomax - place neal if PVR > 200 -- d/w CCU nurse  
  
Acute on Chronic HFrEF  
- EF 16-20%, NYHA Class IV , hx of VF arrest - s/p AICD 
- On Bumex gtt, Milrinone gtt, coreg and aldactone  
- being w/u for LVAD  
  
Recurrent UTI  
- check UA and cx 
  
JOSE on CPAP  
  
PAF s/p DCCV 6/19 
- on Eliquis and amiodarone  
  
Worsening Anemia - iron sat=9% with low Ferritin 
-start IV iron Interval History:  A+O, nto SOB at rest; dinking at least 3 liters fluid per day due to dry mouth; Dr. Rosaline Kamara at bedside Review of Systems: Pertinent items are noted in HPI. Current Medications:  
Current Facility-Administered Medications Medication Dose Route Frequency  potassium chloride SR (KLOR-CON 10) tablet 40 mEq  40 mEq Oral ONCE  
 0.9% sodium chloride infusion  10 mL/hr IntraVENous CONTINUOUS  
 amiodarone (CORDARONE) tablet 100 mg  100 mg Oral BID  iron sucrose (VENOFER) 300 mg in 0.9% sodium chloride 250 mL IVPB  300 mg IntraVENous Q24H  
 heparin 25,000 units in D5W 250 ml infusion  11-25 Units/kg/hr IntraVENous TITRATE  alteplase (CATHFLO) 1 mg in sterile water (preservative free) 1 mL injection  1 mg InterCATHeter PRN  
 influenza vaccine - (6 mos+)(PF) (FLUARIX/FLULAVAL/FLUZONE QUAD) injection 0.5 mL  0.5 mL IntraMUSCular PRIOR TO DISCHARGE  sodium chloride (NS) flush 5-40 mL  5-40 mL IntraVENous Q8H  
 sodium chloride (NS) flush 5-40 mL  5-40 mL IntraVENous PRN  
 aspirin delayed-release tablet 81 mg  81 mg Oral DAILY  spironolactone (ALDACTONE) tablet 25 mg  25 mg Oral DAILY  bumetanide (BUMEX) 0.25 mg/mL infusion  0.5 mg/hr IntraVENous CONTINUOUS  
 milrinone (PRIMACOR) 20 MG/100 ML D5W infusion  0.3 mcg/kg/min IntraVENous CONTINUOUS  
 tamsulosin (FLOMAX) capsule 0.8 mg  0.8 mg Oral DAILY  potassium chloride SR (KLOR-CON 10) tablet 20 mEq  20 mEq Oral TID  allopurinol (ZYLOPRIM) tablet 100 mg  100 mg Oral DAILY No Known Allergies Objective: 
Vitals:   
Vitals:  
 10/27/19 0800 10/27/19 0825 10/27/19 0900 10/27/19 1000 BP: 98/65 102/67 (!) 88/57 95/72 Pulse: 78 85 82 79 Resp: 22  29 25 Temp: 98.8 °F (37.1 °C) SpO2: 91%  (!) 82% 98% Weight:      
 
Intake and Output: 
10/27 07 - 10/27 1900 In: 466.1 [P.O.:360; I.V.:106.1] Out: 625 [Urine:625] 10/25 1901 - 10/27 07 In: 0969 [P.O.:1460; I.V.:1312] Out: Khushbookimberly Frankel [KTKA] Physical Examination: 
Pt intubated    No 
General: NAD,Conversant Neck:  Supple, no mass Resp:  Lungs few dependent rales CV:  RRR,  no murmur or rub, (+) LE edema GI:  Soft, NT, + Bowel sounds Neurologic:  Non focal 
Psych:             AAO x 3 appropriate affect Skin:  No Rash :  val [x]    High complexity decision making was performed 
[]    Patient is at high-risk of decompensation with multiple organ involvement Lab Data Personally Reviewed: I have reviewed all the pertinent labs, microbiology data and radiology studies during assessment. Recent Labs 10/27/19 
0421 10/26/19 
1035 10/26/19 
0409 10/25/19 
1456 *  --  124* 128* K 3.2* 3.7 3.1* 3.4*  
CL 96*  --  87* 90* CO2 29  --  28 29 *  --  259* 110* BUN 23*  --  30* 38* CREA 1.61*  --  1.86* 2.18* CA 7.9*  --  8.2* 8.6 MG 2.1 2.1  --   --   
ALB 2.4*  --  2.5* 2.8* SGOT 58*  --  90* 109* *  --  138* 156* INR  --   --   --  1.6* Recent Labs 10/27/19 
0421 10/26/19 
1533 10/26/19 
0409 10/25/19 
1456 WBC 4.1 4.6 4.2 3.9* HGB 10.0* 9.7* 9.8* 10.1* HCT 31.7* 30.6* 30.2* 31.5* * 167 156 175 No results found for: SDES Lab Results Component Value Date/Time Culture result: NO GROWTH 2 DAYS 10/25/2019 07:14 PM  
 Culture result: ENTEROBACTER CLOACAE (A) 06/26/2019 12:25 PM  
 Culture result: NO GROWTH 1 DAY 06/13/2019 10:44 AM  
 Culture result: ENTEROBACTER CLOACAE (A) 06/04/2019 04:27 AM  
 Culture result: NO GROWTH 5 DAYS 06/04/2019 04:09 AM  
 
Recent Results (from the past 24 hour(s)) VON WILLEBRAND PANEL Collection Time: 10/26/19 11:30 AM  
Result Value Ref Range Factor VIII Activity PENDING %  
 von Willebrand Factor (vWF) Ag PENDING %  
 vWF Activity PENDING % POC VENOUS BLOOD GAS Collection Time: 10/26/19  1:28 PM  
Result Value Ref Range Device: ROOM AIR    
 FIO2 (POC) 21 %  
 pH, venous (POC) 7.443 (H) 7.32 - 7.42    
 pCO2, venous (POC) 43.9 41 - 51 MMHG  
 pO2, venous (POC) 23 (L) 25 - 40 mmHg HCO3, venous (POC) 30.0 (H) 23.0 - 28.0 MMOL/L  
 sO2, venous (POC) 41 (L) 65 - 88 % Base excess, venous (POC) 6 mmol/L Allens test (POC) N/A Total resp. rate 20 Site [facility] Specimen type (POC) MIXED VENOUS    
PTT Collection Time: 10/26/19  3:33 PM  
Result Value Ref Range aPTT 35.5 (H) 22.1 - 32.0 sec  
 aPTT, therapeutic range     58.0 - 77.0 SECS  
CBC WITH AUTOMATED DIFF  Collection Time: 10/26/19  3:33 PM  
 Result Value Ref Range WBC 4.6 4.1 - 11.1 K/uL  
 RBC 3.39 (L) 4.10 - 5.70 M/uL HGB 9.7 (L) 12.1 - 17.0 g/dL HCT 30.6 (L) 36.6 - 50.3 % MCV 90.3 80.0 - 99.0 FL  
 MCH 28.6 26.0 - 34.0 PG  
 MCHC 31.7 30.0 - 36.5 g/dL  
 RDW 16.3 (H) 11.5 - 14.5 % PLATELET 047 322 - 809 K/uL MPV 10.1 8.9 - 12.9 FL  
 NRBC 0.0 0  WBC ABSOLUTE NRBC 0.00 0.00 - 0.01 K/uL NEUTROPHILS 77 (H) 32 - 75 % LYMPHOCYTES 9 (L) 12 - 49 % MONOCYTES 12 5 - 13 % EOSINOPHILS 1 0 - 7 % BASOPHILS 0 0 - 1 % IMMATURE GRANULOCYTES 1 (H) 0.0 - 0.5 % ABS. NEUTROPHILS 3.6 1.8 - 8.0 K/UL  
 ABS. LYMPHOCYTES 0.4 (L) 0.8 - 3.5 K/UL  
 ABS. MONOCYTES 0.6 0.0 - 1.0 K/UL  
 ABS. EOSINOPHILS 0.0 0.0 - 0.4 K/UL  
 ABS. BASOPHILS 0.0 0.0 - 0.1 K/UL  
 ABS. IMM. GRANS. 0.0 0.00 - 0.04 K/UL  
 DF SMEAR SCANNED    
 RBC COMMENTS ANISOCYTOSIS 1+ 
    
 RBC COMMENTS OVALOCYTES PRESENT 
    
 RBC COMMENTS SABAS CELLS 
PRESENT 
    
GLUCOSE, POC Collection Time: 10/26/19  5:28 PM  
Result Value Ref Range Glucose (POC) 130 (H) 65 - 100 mg/dL Performed by Mark Castillo   
PTT Collection Time: 10/26/19 11:33 PM  
Result Value Ref Range aPTT 37.3 (H) 22.1 - 32.0 sec  
 aPTT, therapeutic range     58.0 - 45.9 SECS  
METABOLIC PANEL, COMPREHENSIVE Collection Time: 10/27/19  4:21 AM  
Result Value Ref Range Sodium 132 (L) 136 - 145 mmol/L Potassium 3.2 (L) 3.5 - 5.1 mmol/L Chloride 96 (L) 97 - 108 mmol/L  
 CO2 29 21 - 32 mmol/L Anion gap 7 5 - 15 mmol/L Glucose 102 (H) 65 - 100 mg/dL BUN 23 (H) 6 - 20 MG/DL Creatinine 1.61 (H) 0.70 - 1.30 MG/DL  
 BUN/Creatinine ratio 14 12 - 20 GFR est AA 52 (L) >60 ml/min/1.73m2 GFR est non-AA 43 (L) >60 ml/min/1.73m2 Calcium 7.9 (L) 8.5 - 10.1 MG/DL Bilirubin, total 1.3 (H) 0.2 - 1.0 MG/DL  
 ALT (SGPT) 114 (H) 12 - 78 U/L  
 AST (SGOT) 58 (H) 15 - 37 U/L Alk. phosphatase 131 (H) 45 - 117 U/L Protein, total 5.8 (L) 6.4 - 8.2 g/dL Albumin 2.4 (L) 3.5 - 5.0 g/dL Globulin 3.4 2.0 - 4.0 g/dL A-G Ratio 0.7 (L) 1.1 - 2.2    
CBC W/O DIFF Collection Time: 10/27/19  4:21 AM  
Result Value Ref Range WBC 4.1 4.1 - 11.1 K/uL  
 RBC 3.48 (L) 4.10 - 5.70 M/uL  
 HGB 10.0 (L) 12.1 - 17.0 g/dL HCT 31.7 (L) 36.6 - 50.3 % MCV 91.1 80.0 - 99.0 FL  
 MCH 28.7 26.0 - 34.0 PG  
 MCHC 31.5 30.0 - 36.5 g/dL  
 RDW 16.4 (H) 11.5 - 14.5 % PLATELET 377 (L) 400 - 400 K/uL MPV 10.5 8.9 - 12.9 FL  
 NRBC 0.0 0  WBC ABSOLUTE NRBC 0.00 0.00 - 0.01 K/uL NT-PRO BNP Collection Time: 10/27/19  4:21 AM  
Result Value Ref Range NT pro-BNP 10,708 (H) <125 PG/ML  
MAGNESIUM Collection Time: 10/27/19  4:21 AM  
Result Value Ref Range Magnesium 2.1 1.6 - 2.4 mg/dL POC VENOUS BLOOD GAS Collection Time: 10/27/19  4:33 AM  
Result Value Ref Range Device: NASAL CANNULA Flow rate (POC) 3 L/M  
 FIO2 (POC) 32 %  
 pH, venous (POC) 7.429 (H) 7.32 - 7.42    
 pCO2, venous (POC) 46.3 41 - 51 MMHG  
 pO2, venous (POC) 29 25 - 40 mmHg HCO3, venous (POC) 30.7 (H) 23.0 - 28.0 MMOL/L  
 sO2, venous (POC) 56 (L) 65 - 88 % Base excess, venous (POC) 6 mmol/L Allens test (POC) N/A Total resp. rate 22 Site [facility] Specimen type (POC) MIXED VENOUS    
PTT Collection Time: 10/27/19  6:44 AM  
Result Value Ref Range aPTT 51.7 (H) 22.1 - 32.0 sec  
 aPTT, therapeutic range     58.0 - 77.0 SECS Total time spent with patient:  xxx   min. Care Plan discussed with: 
Patient Family RN Consulting Physician [id number] [facility],      
 
I have reviewed the flowsheets. Chart and Pertinent Notes have been reviewed. No change in PMH ,family and social history from Consult note.  
 
 
Jese Donald MD

## 2019-10-27 NOTE — PROGRESS NOTES
Problem: Falls - Risk of 
Goal: *Absence of Falls Description Document Bishop Hendrix Fall Risk and appropriate interventions in the flowsheet. Outcome: Progressing Towards Goal 
Note:  
Fall Risk Interventions: 
Mobility Interventions: Communicate number of staff needed for ambulation/transfer, Patient to call before getting OOB Medication Interventions: Patient to call before getting OOB, Teach patient to arise slowly Elimination Interventions: Call light in reach, Patient to call for help with toileting needs History of Falls Interventions: Door open when patient unattended, Investigate reason for fall, Room close to nurse's station Problem: Patient Education: Go to Patient Education Activity Goal: Patient/Family Education Outcome: Progressing Towards Goal 
  
Problem: Pain Goal: *Control of Pain Outcome: Progressing Towards Goal 
Goal: *PALLIATIVE CARE:  Alleviation of Pain Outcome: Progressing Towards Goal 
  
Problem: Patient Education: Go to Patient Education Activity Goal: Patient/Family Education Outcome: Progressing Towards Goal 
  
Problem: Pressure Injury - Risk of 
Goal: *Prevention of pressure injury Description Document Mich Scale and appropriate interventions in the flowsheet. Outcome: Progressing Towards Goal 
Note:  
Pressure Injury Interventions: 
Sensory Interventions: Assess changes in LOC, Check visual cues for pain, Float heels, Keep linens dry and wrinkle-free, Maintain/enhance activity level, Minimize linen layers, Turn and reposition approx. every two hours (pillows and wedges if needed) Activity Interventions: Increase time out of bed Mobility Interventions: Pressure redistribution bed/mattress (bed type), Turn and reposition approx. every two hours(pillow and wedges) Nutrition Interventions: Document food/fluid/supplement intake Problem: Patient Education: Go to Patient Education Activity Goal: Patient/Family Education Outcome: Progressing Towards Goal 
  
Problem: Infection - Risk of, Multi-drug Resistant Organism Colonization (MDRO) Goal: *Absence of MDRO colonization Outcome: Progressing Towards Goal 
Goal: *Absence of infection signs and symptoms Outcome: Progressing Towards Goal 
  
Problem: Patient Education: Go to Patient Education Activity Goal: Patient/Family Education Outcome: Progressing Towards Goal 
  
Problem: Patient Education: Go to Patient Education Activity Goal: Patient/Family Education Outcome: Progressing Towards Goal 
  
Problem: Heart Failure: Day 1 Goal: Off Pathway (Use only if patient is Off Pathway) Outcome: Progressing Towards Goal 
Goal: Activity/Safety Outcome: Progressing Towards Goal 
Goal: Consults, if ordered Outcome: Progressing Towards Goal 
Goal: Diagnostic Test/Procedures Outcome: Progressing Towards Goal 
Goal: Nutrition/Diet Outcome: Progressing Towards Goal 
Goal: Discharge Planning Outcome: Progressing Towards Goal 
Goal: Medications Outcome: Progressing Towards Goal 
Goal: Respiratory Outcome: Progressing Towards Goal 
Goal: Treatments/Interventions/Procedures Outcome: Progressing Towards Goal 
Goal: Psychosocial 
Outcome: Progressing Towards Goal 
Goal: *Oxygen saturation within defined limits Outcome: Progressing Towards Goal 
Goal: *Hemodynamically stable Outcome: Progressing Towards Goal 
Goal: *Optimal pain control at patient's stated goal 
Outcome: Progressing Towards Goal 
Goal: *Anxiety reduced or absent Outcome: Progressing Towards Goal 
  
Problem: Heart Failure: Day 2 Goal: Off Pathway (Use only if patient is Off Pathway) Outcome: Progressing Towards Goal 
Goal: Activity/Safety Outcome: Progressing Towards Goal 
Goal: Consults, if ordered Outcome: Progressing Towards Goal 
Goal: Diagnostic Test/Procedures Outcome: Progressing Towards Goal 
Goal: Nutrition/Diet Outcome: Progressing Towards Goal 
Goal: Discharge Planning Outcome: Progressing Towards Goal 
Goal: Medications Outcome: Progressing Towards Goal 
Goal: Respiratory Outcome: Progressing Towards Goal 
Goal: Treatments/Interventions/Procedures Outcome: Progressing Towards Goal 
Goal: Psychosocial 
Outcome: Progressing Towards Goal 
Goal: *Oxygen saturation within defined limits Outcome: Progressing Towards Goal 
Goal: *Hemodynamically stable Outcome: Progressing Towards Goal 
Goal: *Optimal pain control at patient's stated goal 
Outcome: Progressing Towards Goal 
Goal: *Anxiety reduced or absent Outcome: Progressing Towards Goal 
Goal: *Demonstrates progressive activity Outcome: Progressing Towards Goal 
  
Problem: Heart Failure: Day 3 Goal: Off Pathway (Use only if patient is Off Pathway) Outcome: Progressing Towards Goal 
Goal: Activity/Safety Outcome: Progressing Towards Goal 
Goal: Diagnostic Test/Procedures Outcome: Progressing Towards Goal 
Goal: Nutrition/Diet Outcome: Progressing Towards Goal 
Goal: Discharge Planning Outcome: Progressing Towards Goal 
Goal: Medications Outcome: Progressing Towards Goal 
Goal: Respiratory Outcome: Progressing Towards Goal 
Goal: Treatments/Interventions/Procedures Outcome: Progressing Towards Goal 
Goal: Psychosocial 
Outcome: Progressing Towards Goal 
Goal: *Oxygen saturation within defined limits Outcome: Progressing Towards Goal 
Goal: *Hemodynamically stable Outcome: Progressing Towards Goal 
Goal: *Optimal pain control at patient's stated goal 
Outcome: Progressing Towards Goal 
Goal: *Anxiety reduced or absent Outcome: Progressing Towards Goal 
Goal: *Demonstrates progressive activity Outcome: Progressing Towards Goal 
  
Problem: Heart Failure: Day 4 Goal: Off Pathway (Use only if patient is Off Pathway) Outcome: Progressing Towards Goal 
Goal: Activity/Safety Outcome: Progressing Towards Goal 
Goal: Diagnostic Test/Procedures Outcome: Progressing Towards Goal 
Goal: Nutrition/Diet Outcome: Progressing Towards Goal 
Goal: Discharge Planning Outcome: Progressing Towards Goal 
Goal: Medications Outcome: Progressing Towards Goal 
Goal: Respiratory Outcome: Progressing Towards Goal 
Goal: Treatments/Interventions/Procedures Outcome: Progressing Towards Goal 
Goal: Psychosocial 
Outcome: Progressing Towards Goal 
Goal: *Oxygen saturation within defined limits Outcome: Progressing Towards Goal 
Goal: *Hemodynamically stable Outcome: Progressing Towards Goal 
Goal: *Optimal pain control at patient's stated goal 
Outcome: Progressing Towards Goal 
Goal: *Anxiety reduced or absent Outcome: Progressing Towards Goal 
Goal: *Demonstrates progressive activity Outcome: Progressing Towards Goal 
  
Problem: Heart Failure: Day 5 Goal: Off Pathway (Use only if patient is Off Pathway) Outcome: Progressing Towards Goal 
Goal: Activity/Safety Outcome: Progressing Towards Goal 
Goal: Diagnostic Test/Procedures Outcome: Progressing Towards Goal 
Goal: Nutrition/Diet Outcome: Progressing Towards Goal 
Goal: Discharge Planning Outcome: Progressing Towards Goal 
Goal: Medications Outcome: Progressing Towards Goal 
Goal: Respiratory Outcome: Progressing Towards Goal 
Goal: Treatments/Interventions/Procedures Outcome: Progressing Towards Goal 
Goal: Psychosocial 
Outcome: Progressing Towards Goal 
  
Problem: Heart Failure: Discharge Outcomes Goal: *Demonstrates ability to perform prescribed activity without shortness of breath or discomfort Outcome: Progressing Towards Goal 
Goal: *Left ventricular function assessment completed prior to or during stay, or planned for post-discharge Outcome: Progressing Towards Goal 
Goal: *ACEI prescribed if LVEF less than 40% and no contraindications or ARB prescribed Outcome: Progressing Towards Goal 
Goal: *Verbalizes understanding and describes prescribed diet Outcome: Progressing Towards Goal 
Goal: *Verbalizes understanding/describes prescribed medications Outcome: Progressing Towards Goal 
Goal: *Describes available resources and support systems Description 
(eg: Home Health, Palliative Care, Advanced Medical Directive) Outcome: Progressing Towards Goal 
Goal: *Describes smoking cessation resources Outcome: Progressing Towards Goal 
Goal: *Understands and describes signs and symptoms to report to providers(Stroke Metric) Outcome: Progressing Towards Goal 
Goal: *Describes/verbalizes understanding of follow-up/return appt Description 
(eg: to physicians, diabetes treatment coordinator, and other resources Outcome: Progressing Towards Goal 
Goal: *Describes importance of continuing daily weights and changes to report to physician Outcome: Progressing Towards Goal 
  
Problem: Diabetes Self-Management Goal: *Disease process and treatment process Description Define diabetes and identify own type of diabetes; list 3 options for treating diabetes. Outcome: Progressing Towards Goal 
Goal: *Incorporating nutritional management into lifestyle Description Describe effect of type, amount and timing of food on blood glucose; list 3 methods for planning meals. Outcome: Progressing Towards Goal 
Goal: *Incorporating physical activity into lifestyle Description State effect of exercise on blood glucose levels. Outcome: Progressing Towards Goal 
Goal: *Developing strategies to promote health/change behavior Description Define the ABC's of diabetes; identify appropriate screenings, schedule and personal plan for screenings. Outcome: Progressing Towards Goal 
Goal: *Using medications safely Description State effect of diabetes medications on diabetes; name diabetes medication taking, action and side effects. Outcome: Progressing Towards Goal 
Goal: *Monitoring blood glucose, interpreting and using results Description Identify recommended blood glucose targets  and personal targets.  
Outcome: Progressing Towards Goal 
 Goal: *Prevention, detection, treatment of acute complications Description List symptoms of hyper- and hypoglycemia; describe how to treat low blood sugar and actions for lowering  high blood glucose level. Outcome: Progressing Towards Goal 
Goal: *Prevention, detection and treatment of chronic complications Description Define the natural course of diabetes and describe the relationship of blood glucose levels to long term complications of diabetes. Outcome: Progressing Towards Goal 
Goal: *Developing strategies to address psychosocial issues Description Describe feelings about living with diabetes; identify support needed and support network Outcome: Progressing Towards Goal 
Goal: *Insulin pump training Outcome: Progressing Towards Goal 
Goal: *Sick day guidelines Outcome: Progressing Towards Goal 
Goal: *Patient Specific Goal (EDIT GOAL, INSERT TEXT) Outcome: Progressing Towards Goal 
  
Problem: Patient Education: Go to Patient Education Activity Goal: Patient/Family Education Outcome: Progressing Towards Goal

## 2019-10-27 NOTE — PROGRESS NOTES
1930 Bedside shift change report given to Nita Scott RN and Michael Jaime (student) (oncoming nurse) by Venkata Bunch RN (offgoing nurse). Report included the following information SBAR, Kardex, ED Summary, Procedure Summary, Intake/Output, MAR, Recent Results, Med Rec Status and Cardiac Rhythm Paced. 0000 repeat ptt drawn  
 
0100 ptt 37.3, increased Heparin and gave Heparin bolus per MAR order. 0542 Potassium is 3.2 Dr. Janet Cordero paged. 0600 Orders received for 40mEq Potassium po and an additional 40mEq at 1100 
 
0730 Bedside shift change report given to Grant Bonilla RN (oncoming nurse) by Nita Scott RN and Michael Jaime (student) (offgoing nurse). Report included the following information SBAR, Kardex, ED Summary, OR Summary, Procedure Summary, Intake/Output, MAR, Recent Results and Cardiac Rhythm Paced.

## 2019-10-28 NOTE — PROGRESS NOTES
Problem: Mobility Impaired (Adult and Pediatric) Goal: *Acute Goals and Plan of Care (Insert Text) Description FUNCTIONAL STATUS PRIOR TO ADMISSION: Patient was modified independent using a single point cane for functional mobility. Patient reports an increasingly sedentary lifestyle 2* fatigue and SOB/dyspnea. Retired (so is his wife). Patient reports x 3 falls within the last couple of weeks. Patient is wearing either nasal cannula or CPAP at night. LVAD work-up has been initiated. HOME SUPPORT PRIOR TO ADMISSION: The patient lived with his wife, but did not require physical assistance. Physical Therapy Goals Initiated 10/27/2019 1. Patient will move from supine to sit and sit to supine, scoot up and down and roll side to side in bed with independence within 7 days. 2.  Patient will perform sit to/from stand with supervision/set-up within 7 days. 3.  Patient will ambulate 150 feet with least restrictive assistive device and supervision/set-up within 7 days. 4.  Patient will ascend/descend 4 stairs with  handrail(s) with supervision/set-up within 7 days for functional strengthening and community reintegration. Hernando Benitez 5.  Patient will verbally and functionally recall 3/3 sternal precautions within 7 days in preparation for LVAD implantation. 6.  Patient will perform a mock power exchange for power module to/from battery with supervision/set-up within 7 days in preparation for LVAD implantation. Outcome: Progressing Towards Goal 
 
PHYSICAL THERAPY TREATMENT Patient: Prince Carias (75 y.o. male) Date: 10/28/2019 Diagnosis: Acute decompensated heart failure [facility] [I50.9] <principal problem not specified> Precautions: Fall(x 3 in 1-2 weeks) Chart, physical therapy assessment, plan of care and goals were reviewed. ASSESSMENT Patient continues with skilled PT services and is progressing towards goals. Pt tolerated floor bike for 5 min.  Pt performing bed mobility at supervision and CGa for stand pivot. Pt limited by lines. Will continue to mobilize as able Current Level of Function Impacting Discharge (mobility/balance):CGA Other factors to consider for discharge: LVAD work out PLAN : 
Patient continues to benefit from skilled intervention to address the above impairments. Continue treatment per established plan of care. to address goals. Recommendation for discharge: (in order for the patient to meet his/her long term goals) To be determined: LVAD work up This discharge recommendation: 
Has not yet been discussed the attending provider and/or case management IF patient discharges home will need the following DME: to be determined (TBD) SUBJECTIVE:  
Patient stated Chyna Pereira can try.  OBJECTIVE DATA SUMMARY:  
Critical Behavior: 
Neurologic State: Alert, Appropriate for age Orientation Level: Oriented X4 Cognition: Appropriate decision making, Appropriate safety awareness, Follows commands Safety/Judgement: Awareness of environment, Fall prevention, Insight into deficits Functional Mobility Training: 
Bed Mobility: 
  
Supine to Sit: Supervision Transfers: 
Sit to Stand: Stand-by assistance Stand to Sit: Contact guard assistance Bed to Chair: Contact guard assistance Balance: 
Sitting: Intact Standing: Impaired Standing - Static: Good Standing - Dynamic : Fair Ambulation/Gait Training: 
  
 
 
Stairs: 
  
  
   
Functional Outcome Measures/Special Tests: 
 Five times sit to stand: 
 
Five Times Sit to Stand: 27 Seconds Normative averages: 
Clients younger than 61years old  £  10 seconds = Normal  
Clients older than 61years old £ 14.2 seconds = Normal  
Change of ³ 2.3 seconds shows a significant clinical improvement Preston CHAVEZ. Reference values for the five repetition sit to stand test: a descriptive metaanalysis of data from elders. Percept Mot Skills 2006; 103(1):215-222. Therapeutic Exercises:  
 
Floor bike 5 min Pain Rating: 
none Activity Tolerance:  
limited Please refer to the flowsheet for vital signs taken during this treatment. After treatment patient left in no apparent distress:  
Sitting in chair and Call bell within reach COMMUNICATION/COLLABORATION:  
The patients plan of care was discussed with: Registered Nurse Edgardo Martinez PTA Time Calculation: 15 mins

## 2019-10-28 NOTE — NURSE NAVIGATOR
Chart reviewed by Heart Failure Nurse Navigator. Heart Failure database completed. EF:  <15% ACEi/ARB/ARNi: contraindicated due to renal dysfunction BB: Coreg Aldosterone Antagonist: Aldactone Obstructive Sleep Apnea Screening: Yes/cpap STOP-BANG score: 
 Referred to Sleep Medicine: CRT BivICD. NYHA Functional Class IV. Heart Failure Teach Back in Patient Education. Heart Failure Avoiding Triggers on Discharge Instructions. Cardiologist: [facility] Post discharge follow up phone call to be made within 48-72 hours of discharge.

## 2019-10-28 NOTE — PROGRESS NOTES
Name: [facility]: [address]  
: 1950 Admit Date: 10/25/2019 Phone: [phone]  Room: [id number] PCP: Nhi Toussaint MD  MRN: [id number] Date: 10/28/2019  Code: Full Code HPI: 
 
Alert c/o DOUGLAS laying flat Denies CP or abd pain Past Medical History:  
Diagnosis Date  Degenerative disc disease, lumbar  Heart failure ([facility]  High cholesterol  Hypertension  Paroxysmal atrial fibrillation ([facility] 2019  Spinal stenosis Past Surgical History:  
Procedure Laterality Date  HX APPENDECTOMY  HX CORONARY ARTERY BYPASS GRAFT    
 triple  HX HERNIA REPAIR    
 HX IMPLANTABLE CARDIOVERTER DEFIBRILLATOR  [place] CARDIOVERSION ELECTIVE ARRHYTHMIA EXTERNAL N/A 6/10/2019 EP CARDIOVERSION performed by Hilda Carey MD at [address] [address] CATH LAB  [place] CARDIOVERSION ELECTIVE ARRHYTHMIA EXTERNAL N/A 2019 [place] CARDIOVERSION performed by Monika Ibarra MD at [address] [place] [address] CATH LAB  [place] INSJ/RPLCMT PERM DFB W/TRNSVNS LDS 1/DUAL CHMBR N/A 2019 INSERT ICD BIV MULTI performed by Jv Morris MD at [address] [place] [address] CATH LAB  [place] TCAT IMPL WRLS P-ART PRS SNR L-T HEMODYN MNTR N/A 2019 IMPLANT HEART FAILURE MONITORING DEVICE performed by Refugio Melissa MD at [address] [place] [address], [address] CATH LAB Family History Problem Relation Age of Onset  Lung Disease Mother  Hypertension Mother [facility] Arthritis-osteo Mother  Heart Disease Mother  Heart Disease Father  Diabetes Father Social History Tobacco Use  Smoking status: Former Smoker Last attempt to quit: 2010 Years since quittin.9  Smokeless tobacco: Never Used Substance Use Topics  Alcohol use: Not Currently Comment: rarely No Known Allergies Current Facility-Administered Medications Medication Dose Route Frequency  [START ON 10/29/2019] spironolactone (ALDACTONE) tablet 25 mg  25 mg Oral DAILY  0.9% sodium chloride infusion  10 mL/hr IntraVENous CONTINUOUS  
 amiodarone (CORDARONE) tablet 100 mg  100 mg Oral BID  iron sucrose (VENOFER) 300 mg in 0.9% sodium chloride 250 mL IVPB  300 mg IntraVENous Q24H  
 heparin 25,000 units in D5W 250 ml infusion  11-25 Units/kg/hr IntraVENous TITRATE  alteplase (CATHFLO) 1 mg in sterile water (preservative free) 1 mL injection  1 mg InterCATHeter PRN  
 influenza vaccine 2019-20 (6 mos+)(PF) (FLUARIX/FLULAVAL/FLUZONE QUAD) injection 0.5 mL  0.5 mL IntraMUSCular PRIOR TO DISCHARGE  sodium chloride (NS) flush 5-40 mL  5-40 mL IntraVENous Q8H  
 sodium chloride (NS) flush 5-40 mL  5-40 mL IntraVENous PRN  
 aspirin delayed-release tablet 81 mg  81 mg Oral DAILY  bumetanide (BUMEX) 0.25 mg/mL infusion  0.5 mg/hr IntraVENous CONTINUOUS  
 milrinone (PRIMACOR) 20 MG/100 ML D5W infusion  0.3 mcg/kg/min IntraVENous CONTINUOUS  
 tamsulosin (FLOMAX) capsule 0.8 mg  0.8 mg Oral DAILY  allopurinol (ZYLOPRIM) tablet 100 mg  100 mg Oral DAILY REVIEW OF SYSTEMS Negative except as stated in the HPI. Physical Exam:  
Visit Vitals BP 94/58 Pulse 93 Temp 98.4 °F (36.9 °C) Resp 29 Ht 6' 2\" (1.88 m) Wt 84.6 kg (186 lb 8.2 oz) SpO2 100% BMI 23.95 kg/m² General:  Alert, cooperative, no distress, appears stated age. Head:  Normocephalic, without obvious abnormality, atraumatic. Eyes:  Conjunctivae/corneas clear. PERRL, EOMs intact. Nose: Nares normal.  
Throat: Lips, mucosa, and tongue normal.  
Neck: Supple, symmetrical, trachea midline, no adenopathy. Lungs:   Clear to auscultation bilaterally. Heart:  Regular rate and rhythm, S1, S2 normal, no murmur, click, rub or gallop. Abdomen:   Soft, non-tender. Bowel sounds normal.  
Extremities: Extremities normal.  
Pulses: 2+ and symmetric all extremities. Skin: Skin color, texture, turgor normal. No rashes or lesions Neurologic: Grossly nonfocal  
 
 
 Lab Results Component Value Date/Time Sodium 132 (L) 10/28/2019 03:57 AM  
 Potassium 4.7 10/28/2019 03:57 AM  
 Chloride 97 10/28/2019 03:57 AM  
 CO2 32 10/28/2019 03:57 AM  
 BUN 20 10/28/2019 03:57 AM  
 Creatinine 1.59 (H) 10/28/2019 03:57 AM  
 Glucose 110 (H) 10/28/2019 03:57 AM  
 Calcium 8.8 10/28/2019 03:57 AM  
 Magnesium 2.2 10/28/2019 03:57 AM  
 Phosphorus 2.4 (L) 06/04/2019 04:09 AM  
 Lactic acid 1.7 10/25/2019 07:28 PM  
 
 
Lab Results Component Value Date/Time WBC 5.0 10/28/2019 03:57 AM  
 HGB 10.0 (L) 10/28/2019 03:57 AM  
 PLATELET 88 (L) 08/15/3819 03:57 AM  
 MCV 91.7 10/28/2019 03:57 AM  
 
 
Lab Results Component Value Date/Time INR 1.6 (H) 10/25/2019 02:56 PM  
 aPTT 117.1 (HH) 10/28/2019 05:26 AM  
 AST (SGOT) 49 (H) 10/28/2019 03:57 AM  
 Alk. phosphatase 140 (H) 10/28/2019 03:57 AM  
 Protein, total 6.2 (L) 10/28/2019 03:57 AM  
 Albumin 2.4 (L) 10/28/2019 03:57 AM  
 Globulin 3.8 10/28/2019 03:57 AM  
 
 
Lab Results Component Value Date/Time Iron 28 (L) 10/25/2019 02:56 PM  
 TIBC 324 10/25/2019 02:56 PM  
 Iron % saturation 9 (L) 10/25/2019 02:56 PM  
 Ferritin 100 10/25/2019 02:56 PM  
 
 
Lab Results Component Value Date/Time TSH 2.40 10/25/2019 07:39 PM  
  
 
No results found for: PH, PHI, PCO2, PCO2I, PO2, PO2I, HCO3, HCO3I, FIO2, FIO2I Lab Results Component Value Date/Time CK 51 10/25/2019 02:56 PM  
 Troponin-I, Qt. <0.05 10/25/2019 02:56 PM  
  
 
Lab Results Component Value Date/Time Culture result: NO GROWTH 3 DAYS 10/25/2019 07:14 PM  
 Culture result: ENTEROBACTER CLOACAE (A) 06/26/2019 12:25 PM  
 Culture result: NO GROWTH 1 DAY 06/13/2019 10:44 AM  
 
 
Lab Results Component Value Date/Time Hepatitis B surface Ag <0.10 06/13/2019 11:40 AM  
 Hepatitis B surface Ag <0.10 06/13/2019 11:40 AM  
 
 
Lab Results Component Value Date/Time CK 51 10/25/2019 02:56 PM  
 
 
Lab Results Component Value Date/Time Color YELLOW/STRAW 10/25/2019 05:30 PM  
 Appearance CLEAR 10/25/2019 05:30 PM  
 pH (UA) 6.0 10/25/2019 05:30 PM  
 Protein NEGATIVE  10/25/2019 05:30 PM  
 Glucose NEGATIVE  10/25/2019 05:30 PM  
 Ketone NEGATIVE  10/25/2019 05:30 PM  
 Bilirubin NEGATIVE  10/25/2019 05:30 PM  
 Blood SMALL (A) 10/25/2019 05:30 PM  
 Urobilinogen 1.0 10/25/2019 05:30 PM  
 Nitrites NEGATIVE  10/25/2019 05:30 PM  
 Leukocyte Esterase NEGATIVE  10/25/2019 05:30 PM  
 WBC 0-4 10/25/2019 05:30 PM  
 RBC 0-5 10/25/2019 05:30 PM  
 Bacteria NEGATIVE  10/25/2019 05:30 PM  
 
 
IMPRESSION: 
========== 
-Mediastinal lymphadenopathy. -CAD - s/p VFib cardiac arrest s/p CABG 2011 @ [facility] - Dr Miguel Garrison. 
-Ischemic cardiomyopathy with EF 15% - On IV Milrinone gtt. -Afib s/p DCCV. -S/p BiVICD 6/21/2019. 
-JOSE. -Hx of DVT. -Nicotine dependence. PLAN: 
==== 
-History of sternal wound infection in 2011 and subsequently sternum was removed. -differential diagnoses - reactive vs sarcoid vs malignancy. 
-Tough case and will need general anesthesia for EBUS. High risk for decompensation with GA. 
- med LAD is mild and non specific- risks( cardiac issues/hypxia from CHF)  outweigh benefits of GA- EBUS- agree with PET/CT at some point in near future when out of CHF. - at this point, the LAD should not prohibit impellla placement and/or LVAD. Thank you for the consult. Will follow.  
 
Gilford Maryland, MD

## 2019-10-28 NOTE — PROGRESS NOTES
Problem: Self Care Deficits Care Plan (Adult) Goal: *Acute Goals and Plan of Care (Insert Text) Description FUNCTIONAL STATUS PRIOR TO ADMISSION: Patient was modified independent using a single point cane for functional mobility. Patient able to shower and dress himself. However, patient required assistance for household management from his wife. HOME SUPPORT: The patient lived alone with wife to provide assistance. Occupational Therapy Goals Initiated 10/28/2019 1. Patient will perform bathing with supervision/set-up within 7 day(s). 2.  Patient will perform lower body dressing with supervision/set-up within 7 day(s). 3.  Patient will perform grooming with modified independence within 7 day(s). 4.  Patient will perform toilet transfers with modified independence within 7 day(s). 5.  Patient will perform all aspects of toileting with supervision/set-up within 7 day(s). 6.  Patient will participate in upper extremity therapeutic exercise/activities with supervision/set-up for 5 minutes within 7 day(s). 7.  Patient will utilize energy conservation techniques during functional activities with verbal cues within 7 day(s). Outcome: Progressing Towards Goal 
 OCCUPATIONAL THERAPY EVALUATION Patient: Natasha Flanagan (75 y.o. male) Date: 10/28/2019 Primary Diagnosis: Acute decompensated heart failure ([facility] [I50.9] Precautions: Fall(x 3 in 1-2 weeks) ASSESSMENT Based on the objective data described below, the patient presents with generalized weakness, decreased endurance, impaired balance, and decreased activity tolerance s/p admission with acute decompensated heart failure with subsequent LVAD workup at this time.  PTA, patient lived with his wife and was largely independence for ADL tasks, using a cane intermittently for functional mobility and requiring assistance for some aspects of IADL tasks such as cooking and cleaning (manages his own medication; does light cleaning). Patient largely CGA for simulated LB ADLs today and verbalizing insight into deficits and possible LVAD procedure. Current Level of Function Impacting Discharge (ADLs/self-care): CGA LB ADLs Functional Outcome Measure: The patient scored 40/100 on the Barthel Index outcome measure which is indicative of 60% ADL impairment. Other factors to consider for discharge: LVAD workup; assess cognition Patient will benefit from skilled therapy intervention to address the above noted impairments. PLAN : 
Recommendations and Planned Interventions: self care training, functional mobility training, therapeutic exercise, balance training, therapeutic activities, endurance activities, patient education, home safety training and family training/education Frequency/Duration: Patient will be followed by occupational therapy 5 times a week to address goals. Recommendation for discharge: (in order for the patient to meet his/her long term goals) To be determined: following surgical intervention This discharge recommendation: 
Has been made in collaboration with the attending provider and/or case management IF patient discharges home will need the following DME: to be determined (TBD) SUBJECTIVE:  
Patient stated I don't have a sternum.  OBJECTIVE DATA SUMMARY:  
HISTORY:  
Past Medical History:  
Diagnosis Date Degenerative disc disease, lumbar Heart failure ([address] High cholesterol Hypertension Paroxysmal atrial fibrillation ([address] 4/2/2019 Spinal stenosis Past Surgical History:  
Procedure Laterality Date HX APPENDECTOMY HX CORONARY ARTERY BYPASS GRAFT    
 triple HX HERNIA REPAIR    
 HX IMPLANTABLE CARDIOVERTER DEFIBRILLATOR    
 [place] CARDIOVERSION ELECTIVE ARRHYTHMIA EXTERNAL N/A 6/10/2019 EP CARDIOVERSION performed by Gigi Saint, MD at [address], [facility] CATH LAB  
 [place] CARDIOVERSION ELECTIVE ARRHYTHMIA EXTERNAL N/A 6/18/2019 EP CARDIOVERSION performed by Chirag Duvall MD at [address], [facility] CATH LAB  
 [place] INSJ/RPLCMT PERM DFB W/TRNSVNS LDS 1/DUAL [facility] N/A 6/21/2019 INSERT ICD BIV MULTI performed by Ruth Emmanuel MD at [address] [facility] [address], [facility]/s  CATH LAB  
 [place] TCAT IMPL WRLS P-ART PRS SNR L-T HEMODYN MNTR N/A 9/18/2019 IMPLANT HEART FAILURE MONITORING DEVICE performed by Mounika Bernard MD at [address] [facility] [address], [facility]/s  CATH LAB Expanded or extensive additional review of patient history:  
 
Home Situation Home Environment: Private residence # Steps to Enter: 0 One/Two Story Residence: One story Living Alone: No 
Support Systems: Spouse/Significant Other/Partner Patient Expects to be Discharged to[de-identified] Unknown Current DME Used/Available at Home: Cane, straight, Walker, rolling, CPAP Tub or Shower Type: Shower EXAMINATION OF PERFORMANCE DEFICITS: 
Cognitive/Behavioral Status: 
Neurologic State: Alert Orientation Level: Oriented X4 Cognition: Appropriate for age attention/concentration Perception: Appears intact Perseveration: No perseveration noted Safety/Judgement: Decreased awareness of need for safety;Decreased insight into deficits Skin: exposed areas grossly intact; neal catheter; swan-gem, line & leads Edema: none noted Hearing: Auditory Auditory Impairment: None Vision/Perceptual:   
    
    
    
  
    
    
Corrective Lenses: Glasses Range of Motion: BUE 
AROM: Generally decreased, functional 
PROM: Generally decreased, functional 
  
  
  
  
  
  
 
Strength: BUE Strength: Generally decreased, functional 
  
  
  
  
 
Coordination: 
Coordination: Generally decreased, functional 
Fine Motor Skills-Upper: Left Intact; Right Intact Gross Motor Skills-Upper: Left Intact; Right Intact Tone & Sensation: BUE Tone: Normal 
Sensation: Intact Balance: 
Sitting: Intact Standing: Impaired Standing - Static: Fair Standing - Dynamic : Fair Functional Mobility and Transfers for ADLs: 
 Bed Mobility: 
Supine to Sit: Stand-by assistance Sit to Supine: Contact guard assistance Transfers: 
Sit to Stand: Contact guard assistance Stand to Sit: Contact guard assistance Bed to Chair: Contact guard assistance ADL Assessment: 
Feeding: Independent Oral Facial Hygiene/Grooming: Contact guard assistance(infer 2* standing balance) Bathing: Contact guard assistance(infer 2* standing balance) Upper Body Dressing: Contact guard assistance(infer 2* standing balance for item retreival) Lower Body Dressing: Contact guard assistance(infer 2* standing balance) Toileting: Moderate assistance(infer 2* standing balance; neal catheter at this time) ADL Intervention and task modifications: 
  
 
  
 
  
 
  
 
  
 
Lower Body Dressing Assistance Socks: Contact guard assistance Cognitive Retraining Safety/Judgement: Decreased awareness of need for safety;Decreased insight into deficits Functional Measure: 
Barthel Index: 
 
Bathin Bladder: 0 Bowels: 5 Groomin Dressin Feeding: 10 Mobility: 0 Stairs: 0 Toilet Use: 5 Transfer (Bed to Chair and Back): 10 Total: 40/100 The Barthel ADL Index: Guidelines 1. The index should be used as a record of what a patient does, not as a record of what a patient could do. 2. The main aim is to establish degree of independence from any help, physical or verbal, however minor and for whatever reason. 3. The need for supervision renders the patient not independent. 4. A patient's performance should be established using the best available evidence. Asking the patient, friends/relatives and nurses are the usual sources, but direct observation and common sense are also important. However direct testing is not needed. 5. Usually the patient's performance over the preceding 24-48 hours is important, but occasionally longer periods will be relevant. 6. Middle categories imply that the patient supplies over 50 per cent of the effort. 7. Use of aids to be independent is allowed. Harlee Cowden., Barthel, DKitW. ([id number]). Functional evaluation: the Barthel Index. [facility] (14)2. CAROLINA Rogers, Eduin Moss, Sebastian Cho., [place], [facility] (1999). Measuring the change indisability after inpatient rehabilitation; comparison of the responsiveness of the Barthel Index and Functional [place] Measure. Journal of Neurology, Neurosurgery, and Psychiatry, 66(4), 870-857. RIVAS Jo, SEVERO Lindsay, & José Sewell M.A. (2004.) Assessment of post-stroke quality of life in cost-effectiveness studies: The usefulness of the Barthel Index and the EuroQoL-5D. [facility], 13, 226-17 Occupational Therapy Evaluation Charge Determination History Examination Decision-Making LOW Complexity : Brief history review  MEDIUM Complexity : 3-5 performance deficits relating to physical, cognitive , or psychosocial skils that result in activity limitations and / or participation restrictions MEDIUM Complexity : Patient may present with comorbidities that affect occupational performnce. Miniml to moderate modification of tasks or assistance (eg, physical or verbal ) with assesment(s) is necessary to enable patient to complete evaluation Based on the above components, the patient evaluation is determined to be of the following complexity level: LOW Activity Tolerance:  
Fair, desaturates with exertion and requires oxygen and requires rest breaks Please refer to the flowsheet for vital signs taken during this treatment. After treatment patient left in no apparent distress:   
Supine in bed and Call bell within reach COMMUNICATION/EDUCATION:  
The patients plan of care was discussed with: Physical Therapist and Registered Nurse.  
 
Home safety education was provided and the patient/caregiver indicated understanding., Patient/family have participated as able in goal setting and plan of care. and Patient/family agree to work toward stated goals and plan of care. This patients plan of care is appropriate for delegation to GRACY. Thank you for this referral. 
Michelle Irwin Time Calculation: 9 mins

## 2019-10-28 NOTE — PROGRESS NOTES
Problem: Falls - Risk of 
Goal: *Absence of Falls Description Document Giuliana Reyes Fall Risk and appropriate interventions in the flowsheet. Outcome: Progressing Towards Goal 
Note:  
Fall Risk Interventions: 
Mobility Interventions: Communicate number of staff needed for ambulation/transfer, Patient to call before getting OOB Medication Interventions: Patient to call before getting OOB Elimination Interventions: Call light in reach History of Falls Interventions: Door open when patient unattended, Room close to nurse's station Problem: Patient Education: Go to Patient Education Activity Goal: Patient/Family Education Outcome: Progressing Towards Goal 
  
Problem: Pain Goal: *Control of Pain Outcome: Progressing Towards Goal 
Goal: *PALLIATIVE CARE:  Alleviation of Pain Outcome: Progressing Towards Goal 
  
Problem: Patient Education: Go to Patient Education Activity Goal: Patient/Family Education Outcome: Progressing Towards Goal 
  
Problem: Pressure Injury - Risk of 
Goal: *Prevention of pressure injury Description Document Mich Scale and appropriate interventions in the flowsheet. Outcome: Progressing Towards Goal 
Note:  
Pressure Injury Interventions: 
Sensory Interventions: Assess changes in LOC, Check visual cues for pain, Keep linens dry and wrinkle-free, Maintain/enhance activity level, Minimize linen layers, Pressure redistribution bed/mattress (bed type), Turn and reposition approx. every two hours (pillows and wedges if needed) Activity Interventions: Pressure redistribution bed/mattress(bed type) Mobility Interventions: Turn and reposition approx. every two hours(pillow and wedges) Nutrition Interventions: Document food/fluid/supplement intake Problem: Patient Education: Go to Patient Education Activity Goal: Patient/Family Education Outcome: Progressing Towards Goal 
  
 Problem: Infection - Risk of, Multi-drug Resistant Organism Colonization (MDRO) Goal: *Absence of MDRO colonization Outcome: Progressing Towards Goal 
Goal: *Absence of infection signs and symptoms Outcome: Progressing Towards Goal 
  
Problem: Patient Education: Go to Patient Education Activity Goal: Patient/Family Education Outcome: Progressing Towards Goal 
  
Problem: Patient Education: Go to Patient Education Activity Goal: Patient/Family Education Outcome: Progressing Towards Goal 
  
Problem: Heart Failure: Day 1 Goal: Off Pathway (Use only if patient is Off Pathway) Outcome: Progressing Towards Goal 
Goal: Activity/Safety Outcome: Progressing Towards Goal 
Goal: Consults, if ordered Outcome: Progressing Towards Goal 
Goal: Diagnostic Test/Procedures Outcome: Progressing Towards Goal 
Goal: Nutrition/Diet Outcome: Progressing Towards Goal 
Goal: Discharge Planning Outcome: Progressing Towards Goal 
Goal: Medications Outcome: Progressing Towards Goal 
Goal: Respiratory Outcome: Progressing Towards Goal 
Goal: Treatments/Interventions/Procedures Outcome: Progressing Towards Goal 
Goal: Psychosocial 
Outcome: Progressing Towards Goal 
Goal: *Oxygen saturation within defined limits Outcome: Progressing Towards Goal 
Goal: *Hemodynamically stable Outcome: Progressing Towards Goal 
Goal: *Optimal pain control at patient's stated goal 
Outcome: Progressing Towards Goal 
Goal: *Anxiety reduced or absent Outcome: Progressing Towards Goal 
  
Problem: Heart Failure: Day 2 Goal: Off Pathway (Use only if patient is Off Pathway) Outcome: Progressing Towards Goal 
Goal: Activity/Safety Outcome: Progressing Towards Goal 
Goal: Consults, if ordered Outcome: Progressing Towards Goal 
Goal: Diagnostic Test/Procedures Outcome: Progressing Towards Goal 
Goal: Nutrition/Diet Outcome: Progressing Towards Goal 
Goal: Discharge Planning Outcome: Progressing Towards Goal 
Goal: Medications Outcome: Progressing Towards Goal 
Goal: Respiratory Outcome: Progressing Towards Goal 
Goal: Treatments/Interventions/Procedures Outcome: Progressing Towards Goal 
Goal: Psychosocial 
Outcome: Progressing Towards Goal 
Goal: *Oxygen saturation within defined limits Outcome: Progressing Towards Goal 
Goal: *Hemodynamically stable Outcome: Progressing Towards Goal 
Goal: *Optimal pain control at patient's stated goal 
Outcome: Progressing Towards Goal 
Goal: *Anxiety reduced or absent Outcome: Progressing Towards Goal 
Goal: *Demonstrates progressive activity Outcome: Progressing Towards Goal 
  
Problem: Heart Failure: Day 3 Goal: Off Pathway (Use only if patient is Off Pathway) Outcome: Progressing Towards Goal 
Goal: Activity/Safety Outcome: Progressing Towards Goal 
Goal: Diagnostic Test/Procedures Outcome: Progressing Towards Goal 
Goal: Nutrition/Diet Outcome: Progressing Towards Goal 
Goal: Discharge Planning Outcome: Progressing Towards Goal 
Goal: Medications Outcome: Progressing Towards Goal 
Goal: Respiratory Outcome: Progressing Towards Goal 
Goal: Treatments/Interventions/Procedures Outcome: Progressing Towards Goal 
Goal: Psychosocial 
Outcome: Progressing Towards Goal 
Goal: *Oxygen saturation within defined limits Outcome: Progressing Towards Goal 
Goal: *Hemodynamically stable Outcome: Progressing Towards Goal 
Goal: *Optimal pain control at patient's stated goal 
Outcome: Progressing Towards Goal 
Goal: *Anxiety reduced or absent Outcome: Progressing Towards Goal 
Goal: *Demonstrates progressive activity Outcome: Progressing Towards Goal 
  
Problem: Heart Failure: Day 4 Goal: Off Pathway (Use only if patient is Off Pathway) Outcome: Progressing Towards Goal 
Goal: Activity/Safety Outcome: Progressing Towards Goal 
Goal: Diagnostic Test/Procedures Outcome: Progressing Towards Goal 
Goal: Nutrition/Diet Outcome: Progressing Towards Goal 
Goal: Discharge Planning Outcome: Progressing Towards Goal 
Goal: Medications Outcome: Progressing Towards Goal 
Goal: Respiratory Outcome: Progressing Towards Goal 
Goal: Treatments/Interventions/Procedures Outcome: Progressing Towards Goal 
Goal: Psychosocial 
Outcome: Progressing Towards Goal 
Goal: *Oxygen saturation within defined limits Outcome: Progressing Towards Goal 
Goal: *Hemodynamically stable Outcome: Progressing Towards Goal 
Goal: *Optimal pain control at patient's stated goal 
Outcome: Progressing Towards Goal 
Goal: *Anxiety reduced or absent Outcome: Progressing Towards Goal 
Goal: *Demonstrates progressive activity Outcome: Progressing Towards Goal 
  
Problem: Heart Failure: Day 5 Goal: Off Pathway (Use only if patient is Off Pathway) Outcome: Progressing Towards Goal 
Goal: Activity/Safety Outcome: Progressing Towards Goal 
Goal: Diagnostic Test/Procedures Outcome: Progressing Towards Goal 
Goal: Nutrition/Diet Outcome: Progressing Towards Goal 
Goal: Discharge Planning Outcome: Progressing Towards Goal 
Goal: Medications Outcome: Progressing Towards Goal 
Goal: Respiratory Outcome: Progressing Towards Goal 
Goal: Treatments/Interventions/Procedures Outcome: Progressing Towards Goal 
Goal: Psychosocial 
Outcome: Progressing Towards Goal 
  
Problem: Heart Failure: Discharge Outcomes Goal: *Demonstrates ability to perform prescribed activity without shortness of breath or discomfort Outcome: Progressing Towards Goal 
Goal: *Left ventricular function assessment completed prior to or during stay, or planned for post-discharge Outcome: Progressing Towards Goal 
Goal: *ACEI prescribed if LVEF less than 40% and no contraindications or ARB prescribed Outcome: Progressing Towards Goal 
Goal: *Verbalizes understanding and describes prescribed diet Outcome: Progressing Towards Goal 
Goal: *Verbalizes understanding/describes prescribed medications Outcome: Progressing Towards Goal 
 Goal: *Describes available resources and support systems Description 
(eg: Home Health, Palliative Care, Advanced Medical Directive) Outcome: Progressing Towards Goal 
Goal: *Describes smoking cessation resources Outcome: Progressing Towards Goal 
Goal: *Understands and describes signs and symptoms to report to providers(Stroke Metric) Outcome: Progressing Towards Goal 
Goal: *Describes/verbalizes understanding of follow-up/return appt Description 
(eg: to physicians, diabetes treatment coordinator, and other resources Outcome: Progressing Towards Goal 
Goal: *Describes importance of continuing daily weights and changes to report to physician Outcome: Progressing Towards Goal 
  
Problem: Diabetes Self-Management Goal: *Disease process and treatment process Description Define diabetes and identify own type of diabetes; list 3 options for treating diabetes. Outcome: Progressing Towards Goal 
Goal: *Incorporating nutritional management into lifestyle Description Describe effect of type, amount and timing of food on blood glucose; list 3 methods for planning meals. Outcome: Progressing Towards Goal 
Goal: *Incorporating physical activity into lifestyle Description State effect of exercise on blood glucose levels. Outcome: Progressing Towards Goal 
Goal: *Developing strategies to promote health/change behavior Description Define the ABC's of diabetes; identify appropriate screenings, schedule and personal plan for screenings. Outcome: Progressing Towards Goal 
Goal: *Using medications safely Description State effect of diabetes medications on diabetes; name diabetes medication taking, action and side effects. Outcome: Progressing Towards Goal 
Goal: *Monitoring blood glucose, interpreting and using results Description Identify recommended blood glucose targets  and personal targets. Outcome: Progressing Towards Goal 
Goal: *Prevention, detection, treatment of acute complications Description List symptoms of hyper- and hypoglycemia; describe how to treat low blood sugar and actions for lowering  high blood glucose level. Outcome: Progressing Towards Goal 
Goal: *Prevention, detection and treatment of chronic complications Description Define the natural course of diabetes and describe the relationship of blood glucose levels to long term complications of diabetes. Outcome: Progressing Towards Goal 
Goal: *Developing strategies to address psychosocial issues Description Describe feelings about living with diabetes; identify support needed and support network Outcome: Progressing Towards Goal 
Goal: *Insulin pump training Outcome: Progressing Towards Goal 
Goal: *Sick day guidelines Outcome: Progressing Towards Goal 
Goal: *Patient Specific Goal (EDIT GOAL, INSERT TEXT) Outcome: Progressing Towards Goal 
  
Problem: Patient Education: Go to Patient Education Activity Goal: Patient/Family Education Outcome: Progressing Towards Goal

## 2019-10-28 NOTE — PROGRESS NOTES
[facility] 
 [address] [facility], [address] [facility], [facility] Phone: [phone]   [phone]   
  
Nephrology Progress Note Sona Kiser     1950     [id number] Date of Admission : 10/25/2019 
10/28/19 CC:  Follow up for JUAN J, CKD, Hyponatremia Assessment and Plan JUAN J/CKD Stage IV : 
- Creatinine stable 
- he has element of CRS and post renal problems affecting renal function  
- continue Bumex drip and inotropes per HF service Hyponatremia: 
- from hypervolemia and excessive fluid intake 
- stable Hoarseness, vocal cord paralysis, mediastinal adenopathy 
- per pulmonary 
  
Left renal Atrophy : 
- nuclear scan ordered 
  
BPH w/ urinary retention - Improved PVR after starting Flomax  
- trial of Higher dose of Flomax - place neal if PVR > 200 -- d/w CCU nurse  
  
Acute on Chronic HFrEF  
- EF 16-20%, NYHA Class IV , hx of VF arrest - s/p AICD 
- On Bumex gtt, Milrinone gtt, coreg and aldactone  
- being w/u for LVAD  
  
Recurrent UTI  
- check UA and cx 
  
JOSE on CPAP  
  
PAF s/p DCCV 6/19 
- on Eliquis and amiodarone  
  
Chronic Anemia: 
- from CKD and iron deficiency - IV iron Interval History:   
Seen and examined. Cr stable, UOP ok. Still w/ sig SOB laying flat. Up eating breakfast, no cp reported. On bumex and inotropes. Review of Systems: Pertinent items are noted in HPI. Current Medications:  
Current Facility-Administered Medications Medication Dose Route Frequency  potassium chloride SR (KLOR-CON 10) tablet 20 mEq  20 mEq Oral BID  
 0.9% sodium chloride infusion  10 mL/hr IntraVENous CONTINUOUS  
 amiodarone (CORDARONE) tablet 100 mg  100 mg Oral BID  iron sucrose (VENOFER) 300 mg in 0.9% sodium chloride 250 mL IVPB  300 mg IntraVENous Q24H  
 heparin 25,000 units in D5W 250 ml infusion  11-25 Units/kg/hr IntraVENous TITRATE  alteplase (CATHFLO) 1 mg in sterile water (preservative free) 1 mL injection  1 mg InterCATHeter PRN  
 influenza vaccine 2019-20 (6 mos+)(PF) (FLUARIX/FLULAVAL/FLUZONE QUAD) injection 0.5 mL  0.5 mL IntraMUSCular PRIOR TO DISCHARGE  sodium chloride (NS) flush 5-40 mL  5-40 mL IntraVENous Q8H  
 sodium chloride (NS) flush 5-40 mL  5-40 mL IntraVENous PRN  
 aspirin delayed-release tablet 81 mg  81 mg Oral DAILY  spironolactone (ALDACTONE) tablet 25 mg  25 mg Oral DAILY  bumetanide (BUMEX) 0.25 mg/mL infusion  0.5 mg/hr IntraVENous CONTINUOUS  
 milrinone (PRIMACOR) 20 MG/100 ML D5W infusion  0.3 mcg/kg/min IntraVENous CONTINUOUS  
 tamsulosin (FLOMAX) capsule 0.8 mg  0.8 mg Oral DAILY  allopurinol (ZYLOPRIM) tablet 100 mg  100 mg Oral DAILY No Known Allergies Objective: 
Vitals:   
Vitals:  
 10/28/19 0400 10/28/19 0500 10/28/19 0600 10/28/19 0700 BP: 96/68 95/70 100/68 90/67 Pulse: 80 80 84 86 Resp: 21 23 25 29 Temp: 98.8 °F (37.1 °C) SpO2: 96% 99% 98% 97% Weight: 84.6 kg (186 lb 8.2 oz) Height:      
 
Intake and Output: 
No intake/output data recorded. 10/26 1901 - 10/28 0700 In: 8540 [P.O.:1540; I.V.:1501] Out: 1656 [TLXWD:1992] Physical Examination: 
Pt intubated    No 
General: NAD,Conversant Neck:  Supple, no mass Resp:  Lungs few dependent rales CV:  RRR,  no murmur or rub, (+) LE edema GI:  Soft, NT, + Bowel sounds Neurologic:  Non focal 
Psych:             AAO x 3 appropriate affect Skin:  No Rash :  neal [x]    High complexity decision making was performed 
[]    Patient is at high-risk of decompensation with multiple organ involvement Lab Data Personally Reviewed: I have reviewed all the pertinent labs, microbiology data and radiology studies during assessment. Recent Labs 10/28/19 
3788 10/27/19 
0421 10/26/19 
1035 10/26/19 
0409 10/25/19 
1456 * 132*  --  124* 128* K 4.7 3.2* 3.7 3.1* 3.4*  
CL 97 96*  --  87* 90* CO2 32 29  --  28 29 * 102*  --  259* 110* BUN 20 23*  --  30* 38* CREA 1.59* 1.61*  --  1.86* 2.18* CA 8.8 7.9*  --  8.2* 8.6 MG 2.2 2.1 2.1  --   --   
ALB 2.4* 2.4*  --  2.5* 2.8* SGOT 49* 58*  --  90* 109* * 114*  --  138* 156* INR  --   --   --   --  1.6* Recent Labs 10/28/19 
5782 10/27/19 
0421 10/26/19 
1533 10/26/19 
0409 10/25/19 
1456 WBC 5.0 4.1 4.6 4.2 3.9* HGB 10.0* 10.0* 9.7* 9.8* 10.1* HCT 31.9* 31.7* 30.6* 30.2* 31.5* PLT 88* 106* 167 156 175 No results found for: SDES Lab Results Component Value Date/Time Culture result: NO GROWTH 3 DAYS 10/25/2019 07:14 PM  
 Culture result: ENTEROBACTER CLOACAE (A) 06/26/2019 12:25 PM  
 Culture result: NO GROWTH 1 DAY 06/13/2019 10:44 AM  
 Culture result: ENTEROBACTER CLOACAE (A) 06/04/2019 04:27 AM  
 Culture result: NO GROWTH 5 DAYS 06/04/2019 04:09 AM  
 
Recent Results (from the past 24 hour(s)) GLUCOSE, POC Collection Time: 10/27/19 11:31 AM  
Result Value Ref Range Glucose (POC) 157 (H) 65 - 100 mg/dL Performed by Miles Corbin PTT Collection Time: 10/27/19  2:46 PM  
Result Value Ref Range aPTT 57.2 (H) 22.1 - 32.0 sec  
 aPTT, therapeutic range     58.0 - 77.0 SECS  
ECHO ADULT COMPLETE Collection Time: 10/27/19  4:32 PM  
Result Value Ref Range LA Volume 102.28 18 - 58 mL  
 LV E' Lateral Velocity 7.87 cm/s LV E' Septal Velocity 7.50 cm/s Tapse 0.84 (A) 1.5 - 2.0 cm Ao Root D 3.63 cm Aortic Valve Systolic Peak Velocity 676.03 cm/s AoV PG 4.7 mmHg LVIDd 8.21 (A) 4.2 - 5.9 cm  
 LVPWd 1.00 0.6 - 1.0 cm LVIDs 7.63 cm IVSd 0.84 0.6 - 1.0 cm  
 LVOT Peak Velocity 52.17 cm/s LVOT Peak Gradient 1.1 mmHg MV E Isaac 83.01 cm/s Left Atrium to Aortic Root Ratio 1.37   
 RVIDd 2.79 cm LA Vol 4C 79.27 (A) 18 - 58 mL  
 LA Vol 2C 114.76 (A) 18 - 58 mL  
 LA Area 4C 26.0 cm2 LV Mass .2 (A) 88 - 224 g LV Mass AL Index 236.2 49 - 115 g/m2  E/E' lateral 10.55   
 E/E' septal 11.07   
 E/E' ratio (averaged) 10.81 Left Atrium Major Axis 4.97 cm Triscuspid Valve Regurgitation Peak Gradient 40.4 mmHg Pulmonic Valve Max Velocity 90.82 cm/s  
 TR Max Velocity 317.70 cm/s  
 LA Vol Index 51.92 16 - 28 ml/m2 LA Vol Index 58.26 16 - 28 ml/m2 LA Vol Index 40.24 16 - 28 ml/m2 AV Cusp 2.22 cm Left Ventricular Fractional Shortening by 2D [id number] % AV Velocity Ratio 0.48   
 PV peak gradient 3.3 mmHg GLUCOSE, POC Collection Time: 10/27/19  5:15 PM  
Result Value Ref Range Glucose (POC) 136 (H) 65 - 100 mg/dL Performed by Sam Colón PTT Collection Time: 10/27/19  9:46 PM  
Result Value Ref Range aPTT 67.5 (H) 22.1 - 32.0 sec  
 aPTT, therapeutic range     58.0 - 67.4 SECS  
METABOLIC PANEL, COMPREHENSIVE Collection Time: 10/28/19  3:57 AM  
Result Value Ref Range Sodium 132 (L) 136 - 145 mmol/L Potassium 4.7 3.5 - 5.1 mmol/L Chloride 97 97 - 108 mmol/L  
 CO2 32 21 - 32 mmol/L Anion gap 3 (L) 5 - 15 mmol/L Glucose 110 (H) 65 - 100 mg/dL BUN 20 6 - 20 MG/DL Creatinine 1.59 (H) 0.70 - 1.30 MG/DL  
 BUN/Creatinine ratio 13 12 - 20 GFR est AA 53 (L) >60 ml/min/1.73m2 GFR est non-AA 43 (L) >60 ml/min/1.73m2 Calcium 8.8 8.5 - 10.1 MG/DL Bilirubin, total 1.5 (H) 0.2 - 1.0 MG/DL  
 ALT (SGPT) 107 (H) 12 - 78 U/L  
 AST (SGOT) 49 (H) 15 - 37 U/L Alk. phosphatase 140 (H) 45 - 117 U/L Protein, total 6.2 (L) 6.4 - 8.2 g/dL Albumin 2.4 (L) 3.5 - 5.0 g/dL Globulin 3.8 2.0 - 4.0 g/dL A-G Ratio 0.6 (L) 1.1 - 2.2    
CBC W/O DIFF Collection Time: 10/28/19  3:57 AM  
Result Value Ref Range WBC 5.0 4.1 - 11.1 K/uL  
 RBC 3.48 (L) 4.10 - 5.70 M/uL  
 HGB 10.0 (L) 12.1 - 17.0 g/dL HCT 31.9 (L) 36.6 - 50.3 % MCV 91.7 80.0 - 99.0 FL  
 MCH 28.7 26.0 - 34.0 PG  
 MCHC 31.3 30.0 - 36.5 g/dL  
 RDW 16.6 (H) 11.5 - 14.5 % PLATELET 88 (L) 005 - 400 K/uL MPV 10.8 8.9 - 12.9 FL  
 NRBC 0.0 0  WBC ABSOLUTE NRBC 0.00 0.00 - 0.01 K/uL NT-PRO BNP Collection Time: 10/28/19  3:57 AM  
Result Value Ref Range NT pro-BNP 11,458 (H) <125 PG/ML  
MAGNESIUM Collection Time: 10/28/19  3:57 AM  
Result Value Ref Range Magnesium 2.2 1.6 - 2.4 mg/dL POC VENOUS BLOOD GAS Collection Time: 10/28/19  4:13 AM  
Result Value Ref Range Device: NASAL CANNULA Flow rate (POC) 4 L/M  
 pH, venous (POC) 7.456 (H) 7.32 - 7.42    
 pCO2, venous (POC) 44.1 41 - 51 MMHG  
 pO2, venous (POC) 31 25 - 40 mmHg HCO3, venous (POC) 31.0 (H) 23.0 - 28.0 MMOL/L  
 sO2, venous (POC) 62 (L) 65 - 88 % Base excess, venous (POC) 7 mmol/L Allens test (POC) N/A Site SWAN [employer] Specimen type (POC) MIXED VENOUS    
PTT Collection Time: 10/28/19  4:41 AM  
Result Value Ref Range aPTT 125.7 (HH) 22.1 - 32.0 sec  
 aPTT, therapeutic range     58.0 - 77.0 SECS  
PTT Collection Time: 10/28/19  5:26 AM  
Result Value Ref Range aPTT 117.1 (HH) 22.1 - 32.0 sec  
 aPTT, therapeutic range     58.0 - 77.0 SECS Total time spent with patient:  xxx   min. Care Plan discussed with: 
Patient Family RN Consulting Physician [facility]     
 
I have reviewed the flowsheets. Chart and Pertinent Notes have been reviewed. No change in PMH ,family and social history from Consult note.  
 
 
Amparo Marino MD

## 2019-10-28 NOTE — CDMP QUERY
Pt admitted with acute on chronic systolic heart failure. /Pt noted to have hypotension. If possible, please document in the progress notes and d/c summary if you are evaluating and / or treating any of the following: ·    Cardiogenic Shock ·    Other Shock, Please specify Hypotension only, no shock ·    Other, please specify ·    Clinically unable to determine The medical record reflects the following: 
   Risk Factors: Hx. CAD s/p CAB in 2011, ICM, chronic systolic CHF on Lasix 60 mpg po bid PTA, CKD stage 4 Clinical Indicators:10/27-SBP range   , RA to O2 3lnc placed 10/28-SBP anwth-, O2 increased to 4lnc 
10/65-JOV-Muixnjerq pulmonary edema and probable small pleural effusions. Treatment: Bumex infusion, Milrinone gtt, dobutamine gtt started on 10/28, cardiology consult, monitor vital signs, oxygen saturation Thank you, Ryann MITCHELLN, RN 
[facility]  
[phone]

## 2019-10-28 NOTE — PROGRESS NOTES
1930 Bedside shift change report given to Dorie Soulier, RN and Rahel Jackson (student) (oncoming nurse) by Joseph House RN (offgoing nurse). Report included the following information SBAR, Kardex, ED Summary, OR Summary, Procedure Summary, Intake/Output, MAR, Recent Results, Med Rec Status and Cardiac Rhythm Paced. 0000 Pt. Reports feeling short of breath. Pt is dyspneic at rest with minimal improvement when sitting straight up. O2 sats 96% NC increased to 4L. STAT CXR ordered and Dr. Dionicio Garnica paged. 1483 Bedside shift change report given to KATRIN Lilly (oncoming nurse) by Dorie Soulier, RN and Rahel Jackson (student) (offgoing nurse). Report included the following information SBAR, Kardex, ED Summary, OR Summary, Procedure Summary, Intake/Output, MAR, Med Rec Status and Cardiac Rhythm Paced.

## 2019-10-28 NOTE — PROGRESS NOTES
[facility] [address] Social Work LVAD/Heart Transplant/ Heart Failure Evaluation Date: 10/28/2019 NAME: Natasha Flanagan : 1950 ADDRESS: [place] [address]; physical home address is: [address] [place] [address] PHONE NUMBERS:  Sona's Cell # [phone] & Kaylin Ratliff (Spouse) Cell # [phone] Orthodox:Samaritan  COUNTRY OF BIRTH: [place] CITIZENSHIP: [address] MARITAL STATUS:  x 44 years PRIMARYINSURANCE: Medicare A/B 
SECONDARY INSURANCE:  for life  STATUS: past; [employer] (Not [facility] connected) Family Information:  
Where were you born? : [place], [place] Who raised you? Mother & Father Where were you raised? [place] Does your family have a HX of heart problems? yes Are your parents living or ? Father is  & mother resides in [address] [place] [address] Do you have any siblings? Yes, Ayana Costa ([place] & Say [place] ([place] Do you have a good relationship with your siblings? Yes Will they be able to offer support before, during, and after LVAD/Transplant surgery? Yes Do you live with anyone? Yes, with Kaylin Ratliff  If so, are the people you live with in good health? Yes; verbalizes \"we lean on each other from time to time for help\" Are you involved in a significant relationship? (If not ) N/A Do you have any children? Yes, Brain Nichole ([place], [place] & [place], [place]) Do you have a good relationship with your children? Yes Will they be able to offer support before, during, and after LVAD/Transplant surgery? Yes, also reports Slim's wife Lindle Sandifer is a nurse at [employer] and very helpful Do you have any pets? no 
Are you currently a caregiver? no 
 
Educational History:  
What is the highest level of education you have obtained? some college Do you have any problems with reading or writing? no 
 How do you obtain information best? tactile Financial/Work History:  
Are you employed? 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 N/A What is your source of income? SSA, Wife's SSA, [employer], [employer] How do you plan to cover your expenses while off work? N/A Have you applied for disability and Medicaid? No 
Do you have difficulty meeting your current monthly expenses? No 
Do you currently have any financial concerns? no 
 
Pharmacy / Medication History:  
Where do you fill your medications? [address] [facility]  Address and phone number [address], [address] Are you compliant with your medications? yes Do you have any difficulties in obtaining or taking your medications? no  
Dentist name and number? No dentist; has top & bottom dentures PCP name and number? Betzaida Barkley, #[phone] Substance Abuse History: Do you smoke? No, quit in 2010, previously smoked 2-3 ppd for 40 years Does anyone else in your household smoke? no 
Do you drink? No 
Does anyone else in your household drink? no 
Do you use illegal drugs? No 
Does anyone else in your household use illegal drugs? no 
Have you even been involved in a drug or alcohol treatment program? no 
 
Psychiatric History:  
Have you ever been under the care of a mental health professional? no 
Have you ever been hospitalized for psychiatric reasons? no 
Current/Past suicidal ideations?: No 
Current/Past homicidal ideations?: No 
Are you currently on any medications for mental health issues? Yes, low dose of lexapro for depression Is there history of mental illness in your family? no 
Have you ever left the hospital AMA? no 
Do you have a mental health history? Endorses adjustment related depression Mental Status Exam: 1.  Presenting problem has affected: Work, Personal relationships and Health 2. Client manner of dress:   Appropriate 3. Patient Hygiene:  Good 4. Level of Responsiveness: Alert and oriented to all spheres 5. Client motor behavior: Normal 
 
6. Evaluation of client level of distress:  moderate 7. Signs of client distress during interview:  apprehension 8. Affect:   Flat 9. Thought Process: Within normal limits 10. Thought Content / Preoccupations: No evidence of impairment 11. Unusual Perceptual Experiences:  none 12. Attention / concentration:  intact 13. Orientation for:  Oriented in all spheres 14. Memory Functions:  Intact 15. Insight (as age appropriate):  good 16.  Judgement (as age appropriate):    good Legal Concerns:  
Are you currently or have you ever been on parole, probation, or involved in litigation? no 
Have you had any substance related legal problems? no 
Have you ever been incarcerated? no 
Do you have a valid s license? yes Lifestyle/Functional Ability / Personal Care:  
What is your diagnosis and when were you diagnosed? Had a heart attack in 2010, experiencing weakness & SOB; slowly going downhill since January Has your illness affected your life? yes What are your daily activities? \"used to enjoy running farm equipment\" How do you handle stressful situations? \"I do real well\"  What are your coping mechanisms? \"[place] a lot\" What is your living situation? single family home, one level with no [place] Do you use any DME? yes cane, shower chair & has hand  installed in bathroom Are you open to home health or personal care? Yes, [facility] for home health & [facility] for inotropic support Transportation- Do you drive? yes Household/personal tasks- Are you independent in cooking, cleaning/laundry, yardwork, shopping, dressing, and bathing? No; was relying on wife due to SOB/fatigue from CHF Power company name and account number? [employer] Support System: Who will be your primary support person before, during, and after your LVAD/Transplant surgery? spouse Will anyone else be providing assistance and support? Yes, children Who will bring you to clinic appointments before and after LVAD implementation? spouse Do they have a reliable automobile? yes Do they have a valid s license? yes Are you active in community agencies, [employer], Scientologist, or any other jessie based community? Yes, [facility] Who do you usually count on for emotional support? Cherry [place] Have you read material about the LVAD/Heart transplant surgery? yes Are you interested in meeting someone with a LVAD/Heart transplant? yes How does your spouse, significant other, family feel about LVAD/Transplant? At first we both didn't think too much about the LVAD but we've slowly started to realize that we need to accept it Concerns and Questions: Do you have any fears or concerns regarding LVAD/Transplant surgery? No  
How do you think you will prepare yourself for the LVAD/Transplant surgery? Looking forward to it; prayer Do you believe that you understand what challenges and changes you will experience with LVAD/Transplant surgery? yes Do you have a living will or an advance directive? Yes per his report but not on file; requested a copy be provided Impressions/Barriers: [facility]  met with the patient to complete his psychosocial assessment for LVAD candidacy. Sona was polite, soft spoken and engaging throughout the conversation. He acknowledged he suffered a heart attack in 2010 and most recently in January of this year was hospitalized for CHF. He felt the inotropic medication was helpful yet has been experiencing increasing SOB and fatigue.  He shared he was first informed about the pump in January at which point he and his wife didn't feel like it was an option to pursue. However, last week during his clinic visit with Dr. Jimbo Watson he shared he felt so poorly that he wanted to be hospitalized to complete his work up and proceed with surgery. He now vocalizes he's looking forward to the surgery. He met with a current LVAD patient and felt this was helpful. He endorses feeling depressed about his health decline but remains hopeful at this point in time. His depression appears to be adjustment related; he denies SI/HI. Sona's wife, Hardin Ganser, is very supportive of him. She feels she can provide care after the implant and will also rely on their son, Annemarie Ordaz, and his wife who is a nurse for additional support. Hardin Ganser visits him daily and shared she's been apprehensive about the pump (at times tearful as well) but she is working on accepting it. She met with a current VAD patient's wife for support. Of note, Bridgett Rasmussen denies any learning disability/literacy concerns and also denies any financial concerns at this time. Sona also denies any substance abuse/legal/psychiatric history aside from adjustment related depression. He relies [facility] and his family for support. He appears to be an appropriate DT LVAD candidate. Daisy Berry, MSW, LCSW Clinical  [facility] [address]757

## 2019-10-28 NOTE — PROGRESS NOTES
0730 Received verbal bedside report from odalys Rm and Dexter Villalobos RN. Assumed care of the pt.  
 
1030 HF team at bedside. Orders received to start dobutamine and PET scan. Called PET team, states they are full today and tomorrow and will not be back at [facility] until next Monday. HF team made aware. 5 Pt down with RN for renal scan. 1448 Pt returned to unit. 0 Dr. Aura Sorto, cardiology at the bedside. Pt will be going down for a PET scan 10/28 at 1200. Dr. Rin Fraga would like to place an impella tomorrow. 7930 TIERRA Ochoa HF on the phone to consent the pt for JACQUE and impella insertion. PET scan will be done at a later date. Impella scheduled for first case. 1930 Bedside and Verbal shift change report given to Keesha Park RN (oncoming nurse) by Jack Christy RN (offgoing nurse). Report included the following information SBAR, Kardex, Procedure Summary, Intake/Output, MAR, Recent Results and Cardiac Rhythm Paced.

## 2019-10-28 NOTE — PROGRESS NOTES
NYHA class IV A/C systolic heart failure Low EF (10%) Inotrope dependent Patient looks somewhat ashen Adding 2nd inotrope Hgb looks good Platelets low - will give 1 platelet tonight in anticipation of Impella tomorrow Creatinine in the mid 1's Bilirubin and other LFTs look good Lactic acid normal 
 
NT pro-BNP about the same Went over issues with family Agree to proceed with Impella tomorrow Discussed with HF team  
 
CXR - pulmonary edema Intake/Output Summary (Last 24 hours) at 10/28/2019 1626 Last data filed at 10/28/2019 1500 Gross per 24 hour Intake 1841.19 ml Output 3982 ml Net -2140.81 ml  
 
BP 97/71   Pulse 88   Temp 99.3 °F (37.4 °C)   Resp 22   Ht 6' 2\" (1.88 m)   Wt 186 lb 8.2 oz (84.6 kg)   SpO2 98%   BMI 23.95 kg/m² Risk of morbidity and mortality - high Medical decision making - high complexity Total critical care time - 30 minutes (CPT 17244)

## 2019-10-28 NOTE — PROGRESS NOTES
[facility] Progress Note DOS:  10/28/2019 REFERRING PROVIDER:  Carrol Barker MD 
PRIMARY CARE PHYSICIAN: Kendal Wood MD 
PRIMARY CARDIOLOGIST: Carrol Barker MD 
 
 
 
No chief complaint on file. HPI: Sarah Bass is a 71y.o. year old pleasant white male with a history of HTN, HLD, JOSE, CAD s/p cardiac arrest VFib s/p CABG (2011) c/b sternal would infection and sternectomy, ischemic cardiomyopathy LVEF 15-20%, s/p ICD and with LBBB. He was admitted with acute on chronic systolic heart failure with massive volume overload > 20 lbs, in the setting of atrial fibrillation s/p failed DCCV and underwent DCCV and RHC on 6/18. S/p BiVICD on 6/21/19 with Dr. Isael Kline. He was discharged home home on IV milrinone on 6/26/19. He has been followed closely by Dr. Manish Law and the [facility]. Mr. Cole Felix returns to clinic for follow up with his wife. His dyspnea has progressed to the point he is sitting in a chair most of the day. He is unable to perform simple ADLs without limiting dyspnea. His appetite is down. He underwent a sleep study and is using CPAP but has difficulty due to frequent nocturia. He denies presyncope or syncope. He has had a few falls due to generalized weakness but did not hit his head nor lose consciousness. Recent labs reveal stable creatinine from 1.9 - 2.1. He is currently taking torsemide 60 mg bid. His wife has noticed an odor to his urine. He denies fever/chills but admits to feeling cold all the time. Recent Events: 
Negative fluid balance PBNP up 
CI remains < 2 On Milrinone IMPRESSION/PLAN: 
1. Acute on chronic systolic heart failure 
 ICM, Stage D, NYHA Class IV, LVEF 16-20%, s/p CRT on 6/21/19 CRT non-responder Intolerant to RAASi d/t renal dysfunction Off BBrx while on dobutamine Continue IV milrinone to 0.3 mcgs/kg/min Start Dobutamine 2.5mcg/kg/min Cont IV bumex infusion to 0.5 mg/hour Continue spironolactone Follow renal function and electrolytes closely Will discuss impella 5.0 support with Dr. Ron Azul as bridge to durable LVAD Eval in process: Needs Chest CT 
  GI eval- 10 years since last colonoscopy No dental needed- has dentures Continue pre- op education 2. History of VF arrest - s/p AICD No recent shocks 3. CAD s/p CABG in 2010 Recommend [facility]- may need to do with impella support 4. PAF s/p DCCV 6/18/19 Hold eliquis Continue IV heparin for CVA prophylaxis Continue amiodarone 5. Chronic Kidney Disease 3 - single kidney Followed closely by Dr. Orlando Chung Consult nephrology Renal US today 6. History of Urinary Retention UA and urine culture Lizama cath placed 7. JOSE on CPAP Encourage use of home CPAP 
 
8. History of Sternal wound infection requiring sternectomy Stable CV surgery aware - not a contraindication for LVAD Check tagged WBC Check sed rate Check cortisol 9. Anemia, iron deficiency Iron sat 9% 10. H/o DVT IV heparin 11. Migraines 
 stable 12. H/o tobacco abuse Counseling - continued abstinence 13. Depression On lexapro 14. Anorexia - likely multifactorial (depression, congestive hepatopathy) Prealbumin 13.8 Nutritional supplements 15. Mediastinal Lymphadenopathy D.w with Dr. Billie Merritt- does not think this is lung CA, patient is too high risk for lung biopsy Unable to do PET scan until next week will DC Recommend to repeat Chest CT 16. Vocal Cord Paralysis Speech therapy consult Aspiration Precautions Thickened liquids CARDIAC EVALUATION 
ECHO (5/31/19) LVEF 21-25%, severely dilated LA, moderately dilated RA 
ECHO (6/24/19) LVEF 16-20%, Severely dilated LV, trace MR, trace TR 
 
EKG (6/12/19) atrial flutter with occasional PVCs, LBBB QRS 158ms EKG (6/26/19) Atrial fibrillation with premature ventricular or aberrantly conducted complexes, Left bundle branch block, rate 86, , QTc 564 [facility] not in epic Review of Systems:    
General:  Denies fever, fatigue, didn't sleep well overnight HEENT: Denies angioedema, epistaxis; complains of hoarseness Pulmonary: Denies cough, wheeze, hemoptysis Cardiac: Admits to DOUGLAS, edema, orthopnea,  but denies exertional chest pain, palpitations, syncope, near syncope, bleeding, claudication, AICD firing GI:  Admits to abdominal bloating, anorexia but denies change in bowel habits, or black or bloody stools :  Admits to nocturia Musculo: Denies myalgias, arthralgias, + BLE edema Neuro: Denies headaches, CVA/TIA sx Skin:  Denies rash Heme: Denies bruising, bleeding, lymphadenopathy Psych: Admits to depression Physical Exam:  
 
Visit Vitals BP 90/76 Pulse 96 Temp 98.4 °F (36.9 °C) Resp 30 Ht 6' 2\" (1.88 m) Wt 186 lb 8.2 oz (84.6 kg) SpO2 100% BMI 23.95 kg/m² Hemodynamics: 
 CO: CO (l/min): 4.8 l/min CI: CI (l/min/m2): 1.6 l/min/m2 CVP: CVP (mmHg): 15 mmHg (10/28/19 1300) SVR: SVR (dyne*sec)/cm5: 900 (dyne*sec)/cm5 (10/28/19 0900) PAP Systolic: PAP Systolic: 64 (32/83/18 4030) PAP Diastolic: PAP Diastolic: 29 (49/57/88 0300) PVR:   
 SV02:   
 SCV02: SCVO2 (%): 55 % (10/28/19 0900) General:  AAOx3 cooperative, mild distress. HEENT:  Atraumatic. Pink and moist.  Anicteric sclerae. Neck:   Supple, no adenopoathy, PA cath in [facility] Lungs:  Diminished bilateral breath sounds at the bases, No wheezing/rhonchi/rales. Heart:   Median sternotomy scar, sternectomy, AICD in left upper chest wall: Regular rhythm, S1, S2 present, 2/6 murmur, no rubs, S3 gallop. No carotid bruits. Abdomen:  Distended, non-tender. + Bowel sounds. No bruits. Extremities:  +2 bilateral pitting LE edema, no clubbing, no cyanosis. No calf tenderness Neurologic:  Grossly intact. Alert and oriented X 3. No acute neurological distress. Skin:   intact, no wounds, scattered ecchymosis and bruising, no rashes Psych:  Good insight. Depressed History: 
Past Medical History:  
Diagnosis Date  Degenerative disc disease, lumbar  Heart failure ([facility] [address]  High cholesterol  Hypertension  Paroxysmal atrial fibrillation ([facility] [address] 2019  Spinal stenosis Past Surgical History:  
Procedure Laterality Date  HX APPENDECTOMY  HX CORONARY ARTERY BYPASS GRAFT    
 triple  HX HERNIA REPAIR    
 HX IMPLANTABLE CARDIOVERTER DEFIBRILLATOR  OK CARDIOVERSION ELECTIVE ARRHYTHMIA EXTERNAL N/A 6/10/2019 EP CARDIOVERSION performed by Eris Park MD at [address] CATH LAB  OK CARDIOVERSION ELECTIVE ARRHYTHMIA EXTERNAL N/A 2019 EP CARDIOVERSION performed by Debby Mosqueda MD at [address] CATH LAB  OK INSJ/RPLCMT PERM DFB W/TRNSVNS LDS 1/DUAL CHMBR N/A 2019 INSERT ICD BIV MULTI performed by Wilber Kay MD at [address] CATH LAB  OK TCAT IMPL WRLS P-ART PRS SNR L-T HEMODYN MNTR N/A 2019 IMPLANT HEART FAILURE MONITORING DEVICE performed by Lydia Chen MD at [address] CATH LAB Social History Socioeconomic History  Marital status:  Spouse name: Not on file  Number of children: Not on file  Years of education: Not on file  Highest education level: Not on file Occupational History  Not on file Social Needs  Financial resource strain: Not on file  Food insecurity:  
  Worry: Not on file Inability: Not on file  Transportation needs:  
  Medical: Not on file Non-medical: Not on file Tobacco Use  Smoking status: Former Smoker Last attempt to quit: 2010 Years since quittin.9  Smokeless tobacco: Never Used Substance and Sexual Activity  Alcohol use: Not Currently Comment: rarely  Drug use: Never  Sexual activity: Not on file Lifestyle  Physical activity:  
  Days per week: Not on file Minutes per session: Not on file  Stress: Not on file Relationships  Social connections:  
  Talks on phone: Not on file Gets together: Not on file Attends [employer] service: Not on file Active member of club or organization: Not on file Attends meetings of clubs or organizations: Not on file Relationship status: Not on file  Intimate partner violence:  
  Fear of current or ex partner: Not on file Emotionally abused: Not on file Physically abused: Not on file Forced sexual activity: Not on file Other Topics Concern [address] Service Not Asked  Blood Transfusions Not Asked  Caffeine Concern Not Asked  Occupational Exposure Not Asked Cydney Priestly Hazards Not Asked  Sleep Concern Not Asked  Stress Concern Not Asked  Weight Concern Not Asked  Special Diet Not Asked  Back Care Not Asked  Exercise Not Asked  Bike Helmet Not Asked  Seat Belt Not Asked  Self-Exams Not Asked Social History Narrative  Not on file Family History Problem Relation Age of Onset  Lung Disease Mother  Hypertension Mother Bellatrix.Farnsworth Arthritis-osteo Mother  Heart Disease Mother  Heart Disease Father  Diabetes Father Current Medications:  
Current Facility-Administered Medications Medication Dose Route Frequency Provider Last Rate Last Dose  [START ON 10/29/2019] spironolactone (ALDACTONE) tablet 25 mg  25 mg Oral DAILY Shana Sims NP      
 DOBUTamine (DOBUTREX) 500 mg/250 mL (2,000 mcg/mL) infusion  2.5 mcg/kg/min IntraVENous CONTINUOUS Shana Sims NP 6.3 mL/hr at 10/28/19 1110 2.5 mcg/kg/min at 10/28/19 1110  
 sodium chloride (NS) flush 10 mL  10 mL IntraVENous RAD ONCE Mounika Altman MD      
 furosemide (LASIX) injection 20 mg  20 mg IntraVENous RAD ONCE Mounika Altman MD      
 0.9% sodium chloride infusion  10 mL/hr IntraVENous CONTINUOUS Agapito Luz Maria Cantu MD 10 mL/hr at 10/28/19 0445 10 mL/hr at 10/28/19 0445  
 amiodarone (CORDARONE) tablet 100 mg  100 mg Oral BID Saqib QUEZADA MD   100 mg at 10/28/19 0828  
 iron sucrose (VENOFER) 300 mg in 0.9% sodium chloride 250 mL IVPB  300 mg IntraVENous Q24H Sharita Cordero .7 mL/hr at 10/27/19 1458 300 mg at 10/27/19 1458  heparin 25,000 units in D5W 250 ml infusion  11-25 Units/kg/hr IntraVENous TITRATE Mounika Altman MD 12.3 mL/hr at 10/28/19 1128 14 Units/kg/hr at 10/28/19 1128  alteplase (CATHFLO) 1 mg in sterile water (preservative free) 1 mL injection  1 mg InterCATHeter PRN Batsheva Lawrence MD      
 influenza vaccine 2019-20 (6 mos+)(PF) (FLUARIX/FLULAVAL/FLUZONE QUAD) injection 0.5 mL  0.5 mL IntraMUSCular PRIOR TO DISCHARGE Luz Maria Altman MD      
 sodium chloride (NS) flush 5-40 mL  5-40 mL IntraVENous Q8H Jacobo Herrera, NP   10 mL at 10/28/19 2863  sodium chloride (NS) flush 5-40 mL  5-40 mL IntraVENous PRN Jacobo Herrera, NP      
 aspirin delayed-release tablet 81 mg  81 mg Oral DAILY Jacobo Herrera NP   81 mg at 10/28/19 0829  
 bumetanide (BUMEX) 0.25 mg/mL infusion  0.5 mg/hr IntraVENous CONTINUOUS Saqib QUEZADA MD 2 mL/hr at 10/28/19 0744 0.5 mg/hr at 10/28/19 0744  
 milrinone (PRIMACOR) 20 MG/100 ML D5W infusion  0.3 mcg/kg/min IntraVENous CONTINUOUS Mounika Altman MD 7.9 mL/hr at 10/28/19 1110 0.3 mcg/kg/min at 10/28/19 1110  tamsulosin (FLOMAX) capsule 0.8 mg  0.8 mg Oral DAILY Logan Kincaid MD   0.8 mg at 10/28/19 8048  allopurinol (ZYLOPRIM) tablet 100 mg  100 mg Oral DAILY Saqib QUEZADA MD   100 mg at 10/28/19 5632 Allergies: No Known Allergies Recent Labs:  
Labs Latest Ref Rng & Units 10/28/2019 10/28/2019 10/27/2019 10/26/2019 10/26/2019 10/26/2019 10/25/2019 WBC 4.1 - 11.1 K/uL - 5.0 4.1 4.6 - 4.2 3. 9(L) RBC 4.10 - 5.70 M/uL - 3.48(L) 3.48(L) 3.39(L) - 3.35(L) 3.47(L) Hemoglobin 12.1 - 17.0 g/dL - 10. 0(L) 10. 0(L) 9.7(L) - 9. 8(L) 10. 1(L) Hematocrit 36.6 - 50.3 % - 31. 9(L) 31. 7(L) 30. 6(L) - 30. 2(L) 31. 5(L) MCV 80.0 - 99.0 FL - 91.7 91.1 90.3 - 90.1 90.8 Platelets 153 - 577 K/uL - 88(L) 106(L) 167 - 156 175 Lymphocytes 12 - 49 % - - - 9(L) - - - Monocytes 5 - 13 % - - - 12 - - - Eosinophils 0 - 7 % - - - 1 - - - Basophils 0 - 1 % - - - 0 - - - Albumin 3.5 - 5.0 g/dL - 2. 4(L) 2. 4(L) - - 2. 5(L) 2. 8(L) Calcium 8.5 - 10.1 MG/DL - 8.8 7. 9(L) - - 8. 2(L) 8.6 SGOT 15 - 37 U/L - 49(H) 58(H) - - 90(H) 109(H) Glucose 65 - 100 mg/dL - 110(H) 102(H) - - 259(H) 110(H) BUN 6 - 20 MG/DL - 20 23(H) - - 30(H) 38(H) Creatinine 0.70 - 1.30 MG/DL - 1.59(H) 1.61(H) - - 1.86(H) 2.18(H) Sodium 136 - 145 mmol/L - 132(L) 132(L) - - 124(L) 128(L) Potassium 3.5 - 5.1 mmol/L 4.8 4.7 3. 2(L) - 3.7 3. 1(L) 3.4(L) TSH 0.36 - 3.74 uIU/mL - - - - - - 2.40 PSA 0.01 - 4.0 ng/mL - - - - - - 0.3 LDH 85 - 241 U/L - - - - - - 284(H) Some recent data might be hidden Lab Results Component Value Date/Time WBC 5.0 10/28/2019 03:57 AM  
 HGB 10.0 (L) 10/28/2019 03:57 AM  
 HCT 31.9 (L) 10/28/2019 03:57 AM  
 PLATELET 88 (L) 17/16/0580 03:57 AM  
 MCV 91.7 10/28/2019 03:57 AM  
 
Lab Results Component Value Date/Time GFR est non-AA 43 (L) 10/28/2019 03:57 AM  
 GFR est AA 53 (L) 10/28/2019 03:57 AM  
 Creatinine 1.59 (H) 10/28/2019 03:57 AM  
 BUN 20 10/28/2019 03:57 AM  
 Sodium 132 (L) 10/28/2019 03:57 AM  
 Potassium 4.8 10/28/2019 12:49 PM  
 Chloride 97 10/28/2019 03:57 AM  
 CO2 32 10/28/2019 03:57 AM  
 Magnesium 2.2 10/28/2019 03:57 AM  
 Phosphorus 2.4 (L) 06/04/2019 04:09 AM  
 
Lab Results Component Value Date/Time TSH 2.40 10/25/2019 07:39 PM  
 T4, Free 1.7 (H) 10/25/2019 07:39 PM  
  
Lab Results Component Value Date/Time  Sodium 132 (L) 10/28/2019 03:57 AM  
 Potassium 4.8 10/28/2019 12:49 PM  
 Chloride 97 10/28/2019 03:57 AM  
 CO2 32 10/28/2019 03:57 AM  
 Anion gap 3 (L) 10/28/2019 03:57 AM  
 Glucose 110 (H) 10/28/2019 03:57 AM  
 BUN 20 10/28/2019 03:57 AM  
 Creatinine 1.59 (H) 10/28/2019 03:57 AM  
 BUN/Creatinine ratio 13 10/28/2019 03:57 AM  
 GFR est AA 53 (L) 10/28/2019 03:57 AM  
 GFR est non-AA 43 (L) 10/28/2019 03:57 AM  
 Calcium 8.8 10/28/2019 03:57 AM  
 Bilirubin, total 1.5 (H) 10/28/2019 03:57 AM  
 ALT (SGPT) 107 (H) 10/28/2019 03:57 AM  
 AST (SGOT) 49 (H) 10/28/2019 03:57 AM  
 Alk. phosphatase 140 (H) 10/28/2019 03:57 AM  
 Protein, total 6.2 (L) 10/28/2019 03:57 AM  
 Albumin 2.4 (L) 10/28/2019 03:57 AM  
 Globulin 3.8 10/28/2019 03:57 AM  
 A-G Ratio 0.6 (L) 10/28/2019 03:57 AM  
  
Lab Results Component Value Date/Time Hemoglobin A1c 6.6 (H) 10/25/2019 02:56 PM  
  
 
 
Thank you for letting us see him with you, TIERRA Mccarthy [phone], [address] [phone] Fax [phone] [facility] ATTENDING ADDENDUM Patient was seen and examined in person. Data and notes were reviewed. I have discussed and agree with the plan as noted in the NP note above without further additions. Kris Lucio MD PhD 
[address] Rueda [id number] [facility]

## 2019-10-28 NOTE — PROGRESS NOTES
JESÚS Plan: 
  - Patient admitted for acute decompensated heart failure - LVAD workup; possible Impella today; PET scan ordered Reason for Admission:   Acute decompensated heart failure RRAT Score:     17 Do you (patient/family) have any concerns for transition/discharge? Family has no concerns at time of discharge. Plan for utilizing home health:   Open with [facility] and HCP for milrinone infusion Current Advanced Directive/Advance Care Plan:  Not on file Transition of Care Plan:       
 
CM met with patient and spouse at bedside to introduce self and discuss role. Patient states he is open with [facility] for home health and HCP for milrinone infusion company. Both agencies have been notified of patient's admission. DME at home: CPAP - [facility] - [place] [facility] - [facility] Patient resides in [place], [place] with his wife. CM will follow up regarding transitions of care needs closer to discharge. Patient is currently LVAD workup. CM will continue to follow.  
 
Hai Spears, MPH

## 2019-10-28 NOTE — PROGRESS NOTES
VISIT TYPE: INPATIENT PET-CT   
                               PET-CT PREPARATION 
 
DATE:  10/29/2019    TIME:  12:00 pm Injection   1:00 pm Scan Materials for this procedure are ordered in advance and are time-sensitive. If this procedure needs to be canceled or rescheduled, you must call [phone] or [phone] at least 24 hours prior to the appointment time. DIET RESTRICTIONS:  Eat a low carb/high protein diet the evening before the procedure. Avoid food and drink that contains sugars the night before and ESPECIALLY the day of the test.  
No food 4 hours prior to the procedure. Patient may drink all the water up until the time of their appointment. Coffee is ok, as long as an artificial sweetener is used instead of sugar. Hydrate well the night before the exam. 
MEDS & IVs:  If the patient takes insulin, it must be taken at least 4 hours before the scheduled time of the procedure.  If the blood sugar reaches levels that would require administration of insulin within 4 hours of the scheduled procedure, please notify the PET/CT department at [phone], [phone] staff should obtain blood glucose results on patient one hour prior to procedure and call the results to [phone]. The serum glucose must be less than 200 mg/dL at the time of the exam.  For patient requiring medications for pain, anxiety, and claustrophobia, please consult with physician to determine what may be appropriate. NO IV fluid or oral meds containing glucose or dextrose for 6 hours prior to the exam. 
PO medications may be taken with water only. Type 2 diabetic patients taking oral medications only may take these medications with a low carb/high protein meal 4 hours prior to their scheduled PET-CT scan. AVOID:  No procedures requiring exercise for 24 hours prior to scheduled PET-CT scan, and other nuclear medicine procedures may not be scheduled on the same day.

## 2019-10-28 NOTE — PROGRESS NOTES
Problem: Dysphagia (Adult) Goal: *Acute Goals and Plan of Care (Insert Text) Description Speech Pathology Initiated 10/28/19 1. Patient will tolerate regular diet/thin liquids without overt s/s of aspiration within 7 days 2. Patient will participate in [facility] for further objective assessment of swallow physiology within 7 days (once medically stable from Impella/cardiac sx standpoint) Outcome: Progressing Towards Goal 
 
SPEECH LANGUAGE PATHOLOGY BEDSIDE SWALLOW EVALUATION Patient: Natasha Flanagan (75 y.o. male) Date: 10/28/2019 Primary Diagnosis: Acute decompensated heart failure ([facility] [address] [I50.9] Precautions: swallow,  Fall(x 3 in 1-2 weeks) ASSESSMENT : 
Based on the objective data described below, the patient presents with no oral and suspected mild-moderate pharyngeal dysphagia. Pharyngeal dysphagia characterized by suspected reduced hyolaryngeal excursion. No overt s/s of aspiration noted with any consistencies and patient denies difficulty. At this time, recommend proceeding to MBS once medically stable from Impella to further objectively assess swallow function and if diet alteration (I.e. thickened liquids) really necessary (or effective) at reducing risk for aspiration. Recommend continuing thin liquids/regular diet for now given bedside presentation alone. Patient will benefit from skilled intervention to address the above impairments. Patients rehabilitation potential is considered to be Good Factors which may influence rehabilitation potential include: ? None noted ? Mental ability/status ? Medical condition ? Home/family situation and support systems ? Safety awareness ? Pain tolerance/management ? Other: PLAN : 
Recommendations and Planned Interventions: 
Recommend regular diet/THIN liquids for now (patient has been taking thin liquids despite order for nectar) Safe swallowing strategies (upright for all PO, small bites/sips, slow rate) HOLD PO if overt s/s of aspiration present MBS once medically stable (PET and Impella placement scheduled for tomorrow; MBS later in the week?) Frequency/Duration: Patient will be followed by speech-language pathology 4 times a week to address goals. Discharge Recommendations: To Be Determined SUBJECTIVE:  
Patient has had hoarseness for 3+ months which has not gotten any better. Some minor complaints of dysphagia characterized by coughing at meals but not consistently. ENT consulted over weekend with +L VC paresis evident. ENT recommended thickened liquids to reduce aspiration risk-- no MBS or SLP consult ordered at that time for further assessment. Patient reports no difference in swallowing with thins or nectar thickened liquid. His Rn also reports no concerns with swallowing today. Plan for PET scan and Impella tomorrow (10/29) OBJECTIVE:  
 
Past Medical History:  
Diagnosis Date Degenerative disc disease, lumbar Heart failure ([address] High cholesterol Hypertension Paroxysmal atrial fibrillation ([address] 4/2/2019 Spinal stenosis Past Surgical History:  
Procedure Laterality Date HX APPENDECTOMY HX CORONARY ARTERY BYPASS GRAFT    
 triple HX HERNIA REPAIR    
 HX IMPLANTABLE CARDIOVERTER DEFIBRILLATOR    
 [place] CARDIOVERSION ELECTIVE ARRHYTHMIA EXTERNAL N/A 6/10/2019 EP CARDIOVERSION performed by Dottie Stoll MD at [place] [address], [facility] CATH LAB  
 [place] CARDIOVERSION ELECTIVE ARRHYTHMIA EXTERNAL N/A 6/18/2019 EP CARDIOVERSION performed by Georgeanna Cooks, MD at [place] [address], [address] CATH LAB  
 [place] INSJ/RPLCMT PERM DFB W/TRNSVNS LDS 1/DUAL [facility] N/A 6/21/2019 INSERT ICD BIV MULTI performed by Carl Chandler MD at [place] [address], [address] CATH LAB  
 [place] TCAT IMPL WRLS P-ART PRS SNR L-T HEMODYN MNTR N/A 9/18/2019  IMPLANT HEART FAILURE MONITORING DEVICE performed by Clara Jackson MD at [place] [address], [address] CATH LAB  
 
 Prior Level of Function/Home Situation:  
Home Situation Home Environment: Private residence # Steps to Enter: 0 One/Two Story Residence: One story Living Alone: No 
Support Systems: Spouse/Significant Other/Partner Patient Expects to be Discharged to[de-identified] Unknown Current DME Used/Available at Home: Cane, straight, Walker, rolling, CPAP Tub or Shower Type: Shower Diet prior to admission: Regular/thin Current Diet:  Regular/nectar Cognitive and Communication Status: 
Neurologic State: Alert Orientation Level: Oriented X4 Cognition: Appropriate decision making, Follows commands, Appropriate for age attention/concentration, Appropriate safety awareness Perception: Appears intact Perseveration: No perseveration noted Safety/Judgement: Awareness of environment, Insight into deficits Oral Assessment: 
Oral Assessment Labial: No impairment Dentition: Upper & lower dentures Oral Hygiene: moist oral mucosa Lingual: No impairment Velum: No impairment Mandible: No impairment P.O. Trials: 
Patient Position: Upright in bed Vocal quality prior to P.O.: Hoarse Consistency Presented: All consistencies; Puree; Solid; Thin liquid; Ice chips How Presented: Cup/sip;Spoon;SLP-fed/presented;Self-fed/presented; Successive swallows Bolus Acceptance: No impairment Bolus Formation/Control: No impairment Propulsion: No impairment Oral Residue: Less than 10% of bolus(lingual, w/ cracker. Clears w extra swallow) Initiation of Swallow: No impairment Laryngeal Elevation: Decreased Aspiration Signs/Symptoms: None Pharyngeal Phase Characteristics: Poor endurance; Other (comment)(suspected reduced hyolaryngeal excursion) Effective Modifications: Small sips and bites Cues for Modifications: None Oral Phase Severity: No impairment Pharyngeal Phase Severity : Mild-moderate NOMS:  
The NOMS functional outcome measure was used to quantify this patient's level of swallowing impairment. Based on the NOMS, the patient was determined to be at level 7 for swallow function NOMS Swallowing Levels: 
Level 1 (CN): NPO Level 2 (CM): NPO but takes consistency in therapy Level 3 (CL): Takes less than 50% of nutrition p.o. and continues with nonoral feedings; and/or safe with mod cues; and/or max diet restriction Level 4 (CK): Safe swallow but needs mod cues; and/or mod diet restriction; and/or still requires some nonoral feeding/supplements Level 5 (CJ): Safe swallow with min diet restriction; and/or needs min cues Level 6 (CI): Independent with p.o.; rare cues; usually self cues; may need to avoid some foods or needs extra time Level 7 (CH): Independent for all p.o. ECHO. (2003). National Outcomes Measurement System (NOMS): Adult Speech-Language Pathology User's Guide. Pain: 
Pain Scale 1: Numeric (0 - 10) Pain Intensity 1: 0 After treatment:  
?            Patient left in no apparent distress sitting up in chair ? Patient left in no apparent distress in bed ? Call bell left within reach ? Nursing notified ? Caregiver present ? Bed alarm activated COMMUNICATION/EDUCATION:  
The patients plan of care including recommendations, planned interventions, and recommended diet changes were discussed with: Registered Nurse. ? Posted safety precautions in patient's room. ? Patient/family have participated as able in goal setting and plan of care. ?            Patient/family agree to work toward stated goals and plan of care. ?            Patient understands intent and goals of therapy, but is neutral about his/her participation. ? Patient is unable to participate in goal setting and plan of care. Thank you for this referral. 
Paty Subramanian. Angeles Altman, MS, CCC-SLP, BCS-S Time Calculation: 26 mins

## 2019-10-29 NOTE — PROGRESS NOTES
TRANSFER - OUT REPORT: 
 
Verbal report given to Karla(name) on Sona Kiser  being transferred to OR(unit) for ordered procedure Report consisted of patients Situation, Background, Assessment and  
Recommendations(SBAR). Information from the following report(s) SBAR, Kardex, Intake/Output, MAR, Recent Results and Alarm Parameters  was reviewed with the receiving nurse. Lines: PICC Double Lumen 23/79/65 Right;Basilic (Active) Central Line Being Utilized Yes 10/29/2019  4:00 AM  
Criteria for Appropriate Use Hemodynamically unstable, requiring monitoring lines, vasopressors, or volume resuscitation 10/29/2019  4:00 AM  
Site Assessment Clean 10/29/2019  4:00 AM  
Phlebitis Assessment 0 10/29/2019  4:00 AM  
Infiltration Assessment 0 10/29/2019  4:00 AM  
Arm Circumference (cm) 32.5 cm 6/25/2019  4:49 PM  
Date of Last Dressing Change 10/28/19 10/29/2019  4:00 AM  
Dressing Status Clean, dry, & intact 10/29/2019  4:00 AM  
Action Taken Open ports on tubing capped 10/29/2019  4:00 AM  
External Catheter Length (cm) 0 centimeters 10/28/2019  3:32 PM  
Dressing Type Disk with Chlorhexadine gluconate (CHG); Bacteriocidal;Transparent 10/29/2019  4:00 AM  
Hub Color/Line Status Red; Infusing 10/29/2019  4:00 AM  
Positive Blood Return (Site #1) Yes 10/29/2019  4:00 AM  
Hub Color/Line Status Purple; Infusing 10/29/2019  4:00 AM  
Positive Blood Return (Site #2) Yes 10/29/2019  4:00 AM  
Alcohol Cap Used Yes 10/29/2019  4:00 AM  
   
Double Lumen Double Lumen MAC 10/26/19 Right Internal jugular (Active) Central Line Being Utilized Yes 10/29/2019  4:00 AM  
Criteria for Appropriate Use Hemodynamically unstable, requiring monitoring lines, vasopressors, or volume resuscitation 10/29/2019  4:00 AM  
Site Assessment Clean 10/29/2019  4:00 AM  
Infiltration Assessment 0 10/29/2019  4:00 AM  
Affected Extremity/Extremities Color distal to insertion site pink (or appropriate for race) 10/29/2019  4:00 AM  
 Date of Last Dressing Change 10/26/19 10/29/2019  4:00 AM  
Dressing Status Clean, dry, & intact 10/29/2019  4:00 AM  
Dressing Type Disk with Chlorhexadine gluconate (CHG); Bacteriocidal;Transparent 10/29/2019  4:00 AM  
Action Taken Zeroed/Rezeroed; Open ports on tubing capped 10/29/2019  4:00 AM  
Proximal Hub Color/Line Status White; Infusing 10/29/2019  4:00 AM  
Positive Blood Return (Medial Site) Yes 10/29/2019  4:00 AM  
Distal Hub Color/Line Status Brown; Infusing 10/29/2019  4:00 AM  
Positive Blood Return (Lateral Site) Yes 10/29/2019  4:00 AM  
Alcohol Cap Used Yes 10/29/2019  4:00 AM  
   
Ganesh Dhillon 10/26/19 Right Neck (Active) Central Line Being Utilized Yes 10/29/2019  4:00 AM  
Criteria for Appropriate Use Hemodynamically unstable, requiring monitoring lines, vasopressors, or volume resuscitation 10/29/2019  4:00 AM  
Ganesh Dhillon Wave Form Appropriate wave forms 10/29/2019  4:00 AM  
Ganesh Dhillon Length(cm) 63 centimeters 10/28/2019  8:00 PM  
Introducer Utilizied Dual Port 10/29/2019  4:00 AM  
Site Assessment Clean 10/29/2019  4:00 AM  
Dressing Status Clean, dry, & intact 10/29/2019  4:00 AM  
Dressing Type Disk with Chlorhexadine gluconate (CHG); Bacteriocidal;Transparent 10/29/2019  4:00 AM  
Date of Last Dressing Change 10/26/19 10/29/2019  4:00 AM  
Line Status Intact and in place 10/29/2019  4:00 AM  
Treatment Zeroed or re-zeroed 10/29/2019  4:00 AM  
  
 
Opportunity for questions and clarification was provided. Patient transported with: 
 Monitor O2 @ 2 liters Registered Nurse Tech

## 2019-10-29 NOTE — PROGRESS NOTES
Cardiac Surgery Care Coordinator- Met with the family of Anabel Herrera, introduced role of the Cardiac Surgery Co- Nurse. Reviewed plan of care and day of surgery expectations. Received silver colored wedding band from Yuri Matthews 43, gave ring to Mrs Fransisco Vazquez. Provided family with a contact number and an update from OR. Encouraged family to verbalize and emotional support given. Will continue to update throughout the day. 5- Met with the family of Anabel Herrera after Dr. Radha Rodríguez update. Encouraged them to verbalize. They are without questions at this time. [id number]- Family directed to the CCU waiting room. Will continue to follow.  Paloma Pinzon RN

## 2019-10-29 NOTE — BRIEF OP NOTE
BRIEF OP NOTE Pre-Op Diagnosis: heart failure Post-Op Diagnosis: heart failure Procedure: Procedure(s): RIGHT AXILLARY IMPELLA INSERTION Surgeon: Freddie Peabody Assistant(s): MALCOLM Diallo Anesthesia: General  
 
Infusions: Precedex, milrinone, dobut Estimated Blood Loss:50 mL Specimens: * No specimens in log * Drains and pacing wires: None Complications: None Findings: heart failure Implants:  
Implant Name Type Inv. Item Serial No.  Lot No. [id number] No. Used [employer] Surgical Adhesive   N/A  [id number] Right 1 Implanted MALCOLM Diallo

## 2019-10-29 NOTE — PROGRESS NOTES
07:45 - 08:45 (30) 
09:15 - 12:00 (165) 12:30 - 13:00 (30) 
14:30 - 15:30 (60) NYHA class IV A/C systolic heart failure Low EF (10%) Inotrope dependent Malnutrition LVAD Work-up 07:45 - here for high risk intubation; lactic acid normal; bilirubin and other LFTs up a bit 08:00 - intubated 08:15 - /50's 08:30 - going over JACQUE 
 
09:15 - tracking down family 09:30 - family updated; ok to head upstairs 09:45 - back in CCU; ordering TTE 
 
10:00 - here for TTE 
 
10:15 - RV moving better; Impella in good position 10:30 - MAPs high; weaning Dobutamine 10:45 - extubated; oxygen saturation a little low; starting CPAP 
 
11:00 - not taking deep breaths;  re-intubation 11:15 - intubated 11:30 - CO2 retention on ABG; PA's 70/40's; pulmonary edema on CXR; giving diuril 11:45 - PA's coming down 12:30 - Propofol dropped his pressure a bit; going with precedex 12:45 - PA pressures and CVP look much better; will let him rest today and extubate tomorrow; want MAPs 65-75; ok to wean dobutamine 14:30 - lost impella signal 
 
14:45 - dropping Impella to P-8 
 
15:00 - CVP low; cardiac index looks low 15:15 - giving albumin x 2; ok to go back up on dobutamine Discussed with family Impella - flow 4 L/min @ P-8 CXR - pulmonary edema Intake/Output Summary (Last 24 hours) at 10/29/2019 1250 Last data filed at 10/29/2019 5307 Gross per 24 hour Intake 1677.88 ml Output 4075 ml Net -2397.12 ml Visit Vitals BP 99/90 Pulse 97 Temp 98.2 °F (36.8 °C) Resp 28 Ht 6' 2\" (1.88 m) Wt 183 lb (83 kg) SpO2 93% BMI 23.50 kg/m² Risk of morbidity and mortality - high Medical decision making - high complexity Total critical care time - 285 minutes (CPT 73091, 99292 x 6)

## 2019-10-29 NOTE — PROGRESS NOTES
0730 Received verbal bedside report from [facility]. Pt being wheeled down to OR. 
 
0900 OR on the phone states they are finished with the impella insertion. 0955 Pt arrived to CCU via stretcher, intubated with the OR team.   
 
1030 Precedex turned off.  
 
200 Pt following commands, orders received from Dr. Antonio Evans, cardiac surgery to extubated to 6L NC.  
 
1121 Pt's o2 saturations dropped to 86% home c pap placed on pt with oxygen via RT. Pt's o2 saturations did not improve placed on hospital bipap. Saturations decreased to 78%. Pt bagged via ambu, anesthesia paged for intubation. Dr. Td Ferguson, anesthesia at bedside for intubation. Intubation successful. 1420 Placement signal flat. Dr. Antonio Evans made aware, orders received to contact [place] 37 rep. Impella zeroed per impella rep. Wave form returned for a moment then returned to a flat line. P level decreased to 8, wave form improved. Dr. Antonio Evans made aware. 1  Dr. Antonio Evans at bedside. Orders received for 12.5g albumin and titrate Dobutamine to a CI to 1.8. 
 
1620 HF team at the bedside. Orders received to stop bumex drip. 1930 Bedside and Verbal shift change report given to Wilda Benavidez RN (oncoming nurse) by Price Cee (offgoing nurse). Report included the following information SBAR, Kardex, Procedure Summary, Intake/Output, MAR, Recent Results and Cardiac Rhythm paced.

## 2019-10-29 NOTE — INTERDISCIPLINARY ROUNDS
IDR/SLIDR Summary Patient: Donny Shaikh MRN: [id number]    Age: 71 y.o. YOB: 1950 Room/Bed: [address] [place] [address] Admit Diagnosis: Acute decompensated heart failure (HCC) [I50.9]  Principal Diagnosis: <principal problem not specified>  
Goals: Decreased dyspnea with activity ; impella placement today Readmission: YES  Quality Measure: CHF VTE Prophylaxis: Chemical 
Influenza Vaccine screening completed? YES Pneumococcal Vaccine screening completed? YES Mobility needs: No   Nutrition plan:No 
Consults:Case Management Financial concerns:No  Escalated to CM? NO 
RRAT Score: 17   Interventions:H2H Testing due for pt today? NO 
LOS: 4 days Expected length of stay TBD days Discharge plan: TBD   PCP: Romeo Stahl MD 
Transportation needs: No   
Days before discharge:two or more days before discharge Discharge disposition: Home Signed:  
 
Gabino Spencer RN 
10/29/2019 
1:37 AM

## 2019-10-29 NOTE — PROGRESS NOTES
Intubation Note Called to bedside secondary to  respiratory failure post extubation for axillary impella. Patient pre-oxygenated with 100% oxygen. Smooth RSI with Etomidate 8 mg + Succinylcholine 100 mg IV. DVL x 1 with a MAC 3. 
 
7.5 ETT taped and secured at 23 cm at the teeth. 
 
+ Bilateral BS, + Chest rise, + ETCO2 
 
VSS. CXR pending.  
 
Care turned over to covering Attending MD.

## 2019-10-29 NOTE — DIABETES MGMT
[facility] Impella Progress Note Recommendations/ Comments: received consult for insuiln gtt. Spoke with pt's nurse, Vijaya. An insulin gtt was not used for surgery and is not on it now. BG's in range. Current hospital DM medications Lispro normal sensitivity correction scale Chart reviewed on Sona Kiser. Patient is 71 y.o. male s/p Impella. POD 0. No hx DM. A1c indicative of dx of diabetes. Preparing for LVAD 
 
DTC will follow as needed A1c:  
Lab Results Component Value Date/Time Hemoglobin A1c 6.6 (H) 10/25/2019 02:56 PM  
 
 
 
Recent Glucose Results:  
Lab Results Component Value Date/Time GLU 96 10/29/2019 03:32 AM  
 GLUCPOC 182 (H) 10/29/2019 11:54 AM  
 GLUCPOC 146 (H) 10/28/2019 09:15 PM  
  
 
Lab Results Component Value Date/Time Creatinine 1.59 (H) 10/29/2019 03:32 AM  
 
Estimated Creatinine Clearance: 51 mL/min (A) (based on SCr of 1.59 mg/dL (H)). Active Orders Diet DIET NPO  
  
 
PO intake:  
Patient Vitals for the past 72 hrs: 
 % Diet Eaten 10/28/19 1700 15 % 10/28/19 1300 120 % 10/27/19 1800 25 % 10/27/19 1304 100 % 10/27/19 1000 50 % 10/26/19 1748 100 % Will continue to follow as needed. Thank you. Raul Oliver RN, CDE Time spent: 8 min

## 2019-10-29 NOTE — CONSULTS
[address] 
 [address] [place] [address] GASTROENTEROLOGY CONSULTATION NOTE Will Donato Leon, [address] [facility] office [phone] NP/PA in-hospital cell phone M-F until 4:30PM 
After 5PM or on weekends, please call  for physician on call NAME:  Martha Gallagher :   1950 MRN:   [id number] Referring Physician: Shayy Savage NP Consult Date: 10/29/2019 2:17 PM 
 
Chief Complaint: unable to obtain History of Present Illness:  Patient is a 71 y.o. who is seen in consultation at the request of Shayy Savage NP, for LVAD eval, will need colonoscopy. Patient has a past medical history significant for hypertension, hyperlipidemia, coronary artery disease status post cardiac arrest status post CABG, and  ischemic cardiomyopathy. Patient was admitted to the hospital on 10/25/19 for LVAD evaluation. History is unable to be obtained from the patient as he remains intubated. Discussed with RN at the bedside. Patient had Impella placed today. He was reintubated this morning after the procedure. No signs of active GI bleeding. Colonoscopy in  by Dr. Rhiannon Salguero with reported tubular adenomas. Patient is on bivalrudin. I have reviewed the emergency room note, hospital admission note, notes by all other clinicians who have seen the patient during this hospitalization to date. I have reviewed the problem list and the reason for this hospitalization. I have reviewed the allergies and the medications the patient was taking at home prior to this hospitalization. PMH: 
Past Medical History:  
Diagnosis Date  Degenerative disc disease, lumbar  Heart failure ([address]  High cholesterol  Hypertension  Paroxysmal atrial fibrillation ([address] 2019  Spinal stenosis PSH: 
Past Surgical History:  
Procedure Laterality Date  HX APPENDECTOMY  HX CORONARY ARTERY BYPASS GRAFT    
 triple  HX HERNIA REPAIR    
 HX IMPLANTABLE CARDIOVERTER DEFIBRILLATOR  [place] CARDIOVERSION ELECTIVE ARRHYTHMIA EXTERNAL N/A 6/10/2019 EP CARDIOVERSION performed by Zahra Wheeler MD at [facility], [facility] [address] CATH LAB  [place] CARDIOVERSION ELECTIVE ARRHYTHMIA EXTERNAL N/A 2019 EP CARDIOVERSION performed by Osman Gómez MD at [facility], [facility] [address] CATH LAB  [place] INSJ/RPLCMT PERM DFB W/TRNSVNS LDS 1/DUAL CHMBR N/A 2019 INSERT ICD BIV MULTI performed by Mike Zamora MD at Off [place] [address], [facility] [address] CATH LAB  [place] TCAT IMPL WRLS P-ART PRS SNR L-T HEMODYN MNTR N/A 2019 IMPLANT HEART FAILURE MONITORING DEVICE performed by Solitario Pulliam MD at [facility] [place] [address], [facility] [address] CATH LAB Allergies: 
No Known Allergies Home Medications: 
Prior to Admission Medications Prescriptions Last Dose Informant Patient Reported? Taking?  
amiodarone (CORDARONE) 200 mg tablet   No No  
Sig: TAKE 1 TABLET TWICE A DAY  
apixaban (ELIQUIS) 5 mg tablet   Yes Yes Sig: Take 5 mg by mouth two (2) times a day. aspirin delayed-release 81 mg tablet   Yes No  
Sig: Take 81 mg by mouth daily. carvedilol (COREG) 6.25 mg tablet   No No  
Sig: Take 1 Tab by mouth two (2) times daily (with meals). escitalopram oxalate (LEXAPRO) 5 mg tablet   Yes No  
Sig: TAKE 1 TABLET BY MOUTH EVERY DAY  
milrinone (PRIMACOR) 20 mg/100 mL (200 mcg/mL) infusion   No No  
Si.14 mcg/min by IntraVENous route continuous. spironolactone (ALDACTONE) 25 mg tablet   Yes No  
Sig: Take 25 mg by mouth daily. tamsulosin (FLOMAX) 0.4 mg capsule   Yes No  
Sig: Take 0.4 mg by mouth daily. torsemide (DEMADEX) 20 mg tablet   No No  
Sig: Take 3 Tabs by mouth two (2) times a day. Facility-Administered Medications: None Hospital Medications: 
Current Facility-Administered Medications Medication Dose Route Frequency  dextrose 5% infusion  4-20 mL/hr IntraVENous CONTINUOUS  
 bivalirudin (ANGIOMAX) 250 mg in dextrose 5% 250 mL infusion  4-20 mL/hr Other TITRATE  alteplase (CATHFLO) 1 mg in sterile water (preservative free) 1 mL injection  1 mg InterCATHeter PRN  
 bacitracin 500 unit/gram packet 1 Packet  1 Packet Topical PRN  
 sodium chloride (NS) flush 5-40 mL  5-40 mL IntraVENous Q8H  
 sodium chloride (NS) flush 5-40 mL  5-40 mL IntraVENous PRN  
 albumin human 5% (BUMINATE) solution 12.5 g  12.5 g IntraVENous Q2H PRN  
 0.45% sodium chloride infusion  10 mL/hr IntraVENous CONTINUOUS  
 0.9% sodium chloride infusion  9 mL/hr IntraVENous CONTINUOUS  
 acetaminophen (TYLENOL) tablet 650 mg  650 mg Oral Q4H  
 oxyCODONE IR (ROXICODONE) tablet 5 mg  5 mg Oral Q4H PRN  
 oxyCODONE IR (ROXICODONE) tablet 10 mg  10 mg Oral Q4H PRN  
 morphine injection 4 mg  4 mg IntraVENous Q2H PRN  
 naloxone (NARCAN) injection 0.4 mg  0.4 mg IntraVENous PRN  
 mupirocin (BACTROBAN) 2 % ointment   Both Nostrils BID  ondansetron (ZOFRAN) injection 4 mg  4 mg IntraVENous Q4H PRN  
 albuterol (PROVENTIL VENTOLIN) nebulizer solution 2.5 mg  2.5 mg Nebulization Q4H PRN  
 midazolam (VERSED) injection 1 mg  1 mg IntraVENous Q1H PRN  chlorhexidine (PERIDEX) 0.12 % mouthwash 10 mL  10 mL Oral Q12H  
 famotidine (PF) (PEPCID) 20 mg in sodium chloride 0.9% 10 mL injection  20 mg IntraVENous Q12H  
 [START ON 10/30/2019] famotidine (PEPCID) tablet 20 mg  20 mg Oral Q12H  
 [START ON 10/30/2019] magnesium oxide (MAG-OX) tablet 400 mg  400 mg Oral BID  calcium chloride 1 g in 0.9% sodium chloride 250 mL IVPB  1 g IntraVENous PRN  
 bisacodyl (DULCOLAX) suppository 10 mg  10 mg Rectal DAILY PRN  
 [START ON 10/30/2019] senna-docusate (PERICOLACE) 8.6-50 mg per tablet 1 Tab  1 Tab Oral BID  [START ON 10/30/2019] polyethylene glycol (MIRALAX) packet 17 g  17 g Oral DAILY  ELECTROLYTE REPLACEMENT NOTE: Nurse to review Serum Potassium and Magnesuim levels and Initiate Electrolyte Replacement Protocol as needed  1 Each Other PRN  
  magnesium sulfate 1 g/100 ml IVPB (premix or compounded)  1 g IntraVENous PRN  
 glucose chewable tablet 16 g  4 Tab Oral PRN  
 glucagon (GLUCAGEN) injection 1 mg  1 mg IntraMUSCular PRN  
 insulin lispro (HUMALOG) injection   SubCUTAneous TIDAC  insulin lispro (HUMALOG) injection   SubCUTAneous AC&HS  insulin glargine (LANTUS) injection 1-50 Units  1-50 Units SubCUTAneous ONCE PRN  
 dextrose 10% infusion 0-250 mL  0-250 mL IntraVENous PRN  
 morphine injection 2 mg  2 mg IntraVENous Q2H PRN  potassium chloride 20 mEq in 50 ml IVPB  20 mEq IntraVENous Q2H PRN  potassium chloride 20 mEq in 50 ml IVPB  20 mEq IntraVENous Q2H PRN  
 melatonin tablet 3 mg  3 mg Oral QHS PRN  
 succinylcholine (ANECTINE) 20 mg/mL injection  succinylcholine (ANECTINE) injection 100 mg  100 mg IntraVENous NOW  propofol (DIPRIVAN) infusion  0-50 mcg/kg/min IntraVENous TITRATE  ceFAZolin (ANCEF) 2 g/20 mL in sterile water IV syringe  2 g IntraVENous Q8H  
 dexmedeTOMidine (PRECEDEX) 400 mcg in 0.9% sodium chloride 100 mL infusion  0.2-1.2 mcg/kg/hr IntraVENous TITRATE  spironolactone (ALDACTONE) tablet 25 mg  25 mg Oral DAILY  DOBUTamine (DOBUTREX) 500 mg/250 mL (2,000 mcg/mL) infusion  2.5 mcg/kg/min IntraVENous TITRATE  milrinone (PRIMACOR) 20 MG/100 ML D5W infusion  0.3 mcg/kg/min IntraVENous CONTINUOUS  
 amiodarone (CORDARONE) tablet 100 mg  100 mg Oral BID  influenza vaccine - (6 mos+)(PF) (FLUARIX/FLULAVAL/FLUZONE QUAD) injection 0.5 mL  0.5 mL IntraMUSCular PRIOR TO DISCHARGE  aspirin delayed-release tablet 81 mg  81 mg Oral DAILY  bumetanide (BUMEX) 0.25 mg/mL infusion  0.5 mg/hr IntraVENous CONTINUOUS  
 tamsulosin (FLOMAX) capsule 0.8 mg  0.8 mg Oral DAILY  allopurinol (ZYLOPRIM) tablet 100 mg  100 mg Oral DAILY Social History: 
Social History Tobacco Use  Smoking status: Former Smoker Last attempt to quit: 2010 Years since quittin.9  Smokeless tobacco: Never Used Substance Use Topics  Alcohol use: Not Currently Comment: rarely Family History: 
Family History Problem Relation Age of Onset  Lung Disease Mother  Hypertension Mother Canseco Arthritis-osteo Mother  Heart Disease Mother  Heart Disease Father  Diabetes Father Review of Systems: 
Unable to obtain as patient is intubated Objective:  
 
Patient Vitals for the past 8 hrs: 
 BP Pulse Resp SpO2 Height Weight 10/29/19 1300  77 23     
10/29/19 1200 112/82 100 20     
10/29/19 1155     6' 2\" (1.88 m) 83 kg (183 lb) 10/29/19 1020  97 28 93 %    
10/29/19 1016     6' 2\" (1.88 m) 83 kg (183 lb) 10/29/19 0954 99/90 94 16 95 %    
10/29/19 0950  85 18 95 %    
10/29/19 0700 94/67 77 13 93 %    
 
10/29 0701 - 10/29 1900 In: -  
Out: 3516 [[id number]:[id number]] 10/27 1901 - 10/29 0700 In: 1718 [P.O.:1320; I.V.:1507.2] Out: [facility] [WYCHR:[id number]] EXAM:   
 CONST:  Intubated NEURO:  Intubated HEENT: ET tube in place LUNGS: CTA bilaterally anteriorly CARD:  S1 S2 
 ABD:  Soft, non distended, no apparent tenderness. + Bowel sounds. EXT:  Warm PSYCH: Unable to assess Data Review Recent Labs 10/29/19 
5526 10/28/19 
1885 WBC 4.4 5.0 HGB 9.8* 10.0* HCT 31.3* 31.9*  
 88* Recent Labs 10/29/19 
7849 10/28/19 
1249 10/28/19 
0357 *  --  132* K 4.1 4.8 4.7 CL 93*  --  97  
CO2 31  --  32 BUN 24*  --  20  
CREA 1.59*  --  1.59* GLU 96  --  110* CA 8.9  --  8.8 Recent Labs 10/29/19 
7350 10/28/19 
1210 SGOT 48* 49* * 140* TP 6.1* 6.2* ALB 2.4* 2.4*  
GLOB 3.7 3.8 Recent Labs 10/29/19 
3218 10/28/19 
1714 INR 1.3*  --   
PTP 13.0*  --   
APTT 63.9* 77.2* Assessment:  
· LVAD evaluation: Hgb 9.8, platelets 545, INR 1.3. On bival.  
· History of colon polyps · Acute on chronic systolic heart failure · History of VF arrest status post AICD 
 · Coronary artery disease status post CABG in 2010 · Atrial fibrillation Patient Active Problem List  
Diagnosis Code  Paroxysmal atrial fibrillation (HCC) I48.0  Acute on chronic systolic CHF (congestive heart failure) (HCC) I50.23  Systolic CHF, chronic (HCC) I50.22  
 Peripheral vascular disease (HCC) I73.9  Acute decompensated heart failure (HCC) I50.9 Plan: · Monitor CBC · Colonoscopy timing to be determined · Will follow · Patient was discussed with and will be seen by Dr. Gloria Guerrero · Thank you for allowing me to participate in care of Marta Ovalles Signed By: Fabio Tong   
 10/29/2019  2:17 PM 
  
 
This patient was seen and examined by me in a face-to-face visit today. I reviewed the medical record including lab work, imaging and other provider notes. I confirmed the history as described above. I spoke to the patient. I reviewed the medical record including lab work, imaging and other provider notes. I confirmed the history as described above. The patient was incapable of giving a meaningful history. I discussed this case in detail with Fabio Singh. I formulated an  assessment of this patient and developed a treatment plan. I agree with the above consultation note. I agree with the history, exam and assessment and plan as outlined in the note. I would like to add the following: He is currently critically ill in ICU and is undergoing evaluation for LVAD implantation. He had a colonoscopy 10 years ago at which time polyps were removed. We have been consulted for colonoscopy as part of his LVAD evaluation. He is too critically ill at this time for colonoscopy and is intubated in the ICU. Please call us back when he is clinically improved enough to tolerate colonoscopy preparation. Will sign off for now. Please call with any questions. Alfonso Guerrero MD

## 2019-10-29 NOTE — ANESTHESIA PROCEDURE NOTES
JACQUE 
 
 
 
Procedure Details: probe placement, image aquisition & interpretation Risks and benefits discussed with the patient and plans are to proceed Procedure Note Performed by: Abe Gonzalez MD 
Authorized by: Abe Gonzalez MD  
 
 
Indications: assessment of surgical repair Modalities: 2D, CF, PWD Probe Type: multiplane Insertion: atraumatic Patient Status: intubated and sedated Echocardiographic and Doppler Measurements Aorta  Size  Diam(cm)  Dissection PlaqueThick(mm)  Plaque Mobile Ascending normal  No 0-3 No  
 Arch normal  No 0-3 No  
 Descending normal  No 0-3 No  
 
 
 
 Valves  Annulus  Stenosis  Area/Grad  Regurg  Leaflet Morph  Leaflet Motion Aortic normal none  0 normal normal  
 Mitral dilated   3+ normal normal  
 Tricuspid dilated none  2+ normal normal  
 
 
 
 Atria  Size  SEC (smoke)  Thrombus  Tumor  Device Rt Atrium dilated No No No No  
 Lt Atrium dilated Yes No No No  
 
Interatrial Septum Morphology: normal 
 
Interventricular Septum Morphology: normal 
 
Ventricle  Cavity Size  Cavity Dimension Hypertrophy  Thrombus  Gloal FXN  EF  
 RV dilated  No no severely impaired LV dilated 8  No severely impaired 5-10% Regional Function 
(1 = normal, 2 = mildly hypokinetic, 3 = severely hypokinetic, 4 = akinetic, 5 = dyskinetic) LAV - [place] ME LAV = 0  ME LAV = 90  ME LAV = 130 Basal Sept:3 Basal Ant:3 Basal Post:4 Mid Sept:3 Mid Ant:3 Mid Post:4 Apical Sept:3 Apical Ant:3 Basal Ant Sept:3 Basal Lat:3 Basal Inf:3 Mid Ant Sept:3 Mid Lat:3 Mid Inf:3 Apical Lat:3 Apical Inf:4   
 
 
Pericardium: normal 
 
Post Intervention Follow-up Study Ventricular Global Function: unchanged Ventricular Regional Function: unchanged Valve  Function  Regurgitation  Area Aortic  1+ Mitral     
 Tricuspid Prosthetic Complications: None Comments: Impella placed ~3.5 cm across the aortic valve with good flow, mild AI

## 2019-10-29 NOTE — PROGRESS NOTES
Physical Therapy 10/29/2019 Chart reviewed. Patient just returned to the floor following impella insertion. Hold PT at this time and f/u later as able/appropriate. Thank you.  
 
Claire Boland, PT, DPT

## 2019-10-29 NOTE — PROGRESS NOTES
[facility] 
 [address] [facility], [facility] [address] [facility] Phone: [phone]   [phone]   
  
Nephrology Progress Note Sona Kiser     1950     [id number] Date of Admission : 10/25/2019 
10/29/19 CC:  Follow up for JUAN J, CKD, Hyponatremia Assessment and Plan JUAN J/CKD Stage IV : 
- likely from CRS 
- Cr stable 
- continue Bumex drip and inotropes per HF service CKD IV: 
- L renal atrophy 
- NM scan noted Hyponatremia: 
- from hypervolemia and excessive fluid intake 
- stable Hoarseness, vocal cord paralysis, mediastinal adenopathy\" - will need eventual PET/CT 
- per pulmonary 
  
BPH w/ urinary retention - Improved PVR after starting Flomax  
- trial of Higher dose of Flomax - place neal if PVR > 200 -- d/w CCU nurse  
  
Acute on Chronic HFrEF  
- EF 16-20%, NYHA Class IV , hx of VF arrest - s/p AICD 
- Impella insertion 10/29 
  
Recurrent UTI  
- urine cultures pending 
  
JOSE on CPAP  
  
PAF s/p DCCV 6/19 
- on Eliquis and amiodarone  
  
Chronic Anemia: 
- from CKD and iron deficiency - s/p IV iron Interval History:   
Seen and examined post Impella placement. Cr stable, good UOP. Intubated and sedated now. BP stable. Review of Systems: Pertinent items are noted in HPI. Current Medications:  
Current Facility-Administered Medications Medication Dose Route Frequency  dexmedeTOMidine (PRECEDEX) 400 mcg in 0.9% sodium chloride 100 mL infusion  0.2-0.7 mcg/kg/hr IntraVENous TITRATE  spironolactone (ALDACTONE) tablet 25 mg  25 mg Oral DAILY  0.9% sodium chloride infusion 250 mL  250 mL IntraVENous PRN  
 DOBUTamine (DOBUTREX) 500 mg/250 mL (2,000 mcg/mL) infusion  2.5 mcg/kg/min IntraVENous TITRATE  milrinone (PRIMACOR) 20 MG/100 ML D5W infusion  0.3 mcg/kg/min IntraVENous CONTINUOUS  
 0.9% sodium chloride infusion  6 mL/hr IntraVENous CONTINUOUS  
 0.9% sodium chloride infusion  5 mL/hr IntraVENous CONTINUOUS  
  amiodarone (CORDARONE) tablet 100 mg  100 mg Oral BID  alteplase (CATHFLO) 1 mg in sterile water (preservative free) 1 mL injection  1 mg InterCATHeter PRN  
 influenza vaccine 2019-20 (6 mos+)(PF) (FLUARIX/FLULAVAL/FLUZONE QUAD) injection 0.5 mL  0.5 mL IntraMUSCular PRIOR TO DISCHARGE  sodium chloride (NS) flush 5-40 mL  5-40 mL IntraVENous Q8H  
 sodium chloride (NS) flush 5-40 mL  5-40 mL IntraVENous PRN  
 aspirin delayed-release tablet 81 mg  81 mg Oral DAILY  bumetanide (BUMEX) 0.25 mg/mL infusion  0.5 mg/hr IntraVENous CONTINUOUS  
 tamsulosin (FLOMAX) capsule 0.8 mg  0.8 mg Oral DAILY  allopurinol (ZYLOPRIM) tablet 100 mg  100 mg Oral DAILY No Known Allergies Objective: 
Vitals:   
Vitals:  
 10/29/19 0700 10/29/19 0950 10/29/19 0954 10/29/19 1016 BP: 94/67  99/90 Pulse: 77 (P) 85 94 Resp: 13 (P) 18 16 Temp:      
SpO2: 93% (P) 95% 95% Weight:    83 kg (183 lb) Height:    6' 2\" (1.88 m) Intake and Output: 
10/29 0701 - 10/29 1900 In: -  
Out: 790 [Urine:750] 10/27 1901 - 10/29 0700 In: 6235 [P.O.:1320; I.V.:1507.2] Out: Marianne England [[id number]] Physical Examination: 
Pt intubated     Yes General: sedated Neck:  Supple, no mass Resp:  Lungs few dependent rales CV:  RRR,  no murmur or rub, (+) LE edema GI:  Soft, NT, + Bowel sounds Neurologic:  Sedated on the vent Psych:             Unable to assess Skin:  No Rash :  neal [x]    High complexity decision making was performed 
[]    Patient is at high-risk of decompensation with multiple organ involvement Lab Data Personally Reviewed: I have reviewed all the pertinent labs, microbiology data and radiology studies during assessment. Recent Labs 10/29/19 
6748 10/28/19 
1249 10/28/19 
0357 10/27/19 
0421 10/26/19 
1035 *  --  132* 132*  --   
K 4.1 4.8 4.7 3.2* 3.7 CL 93*  --  97 96*  --   
CO2 31  --  32 29  --   
GLU 96  --  110* 102*  --   
BUN 24*  --  20 23*  --   
 CREA 1.59*  --  1.59* 1.61*  --   
CA 8.9  --  8.8 7.9*  --   
MG 2.2  --  2.2 2.1 2.1 ALB 2.4*  --  2.4* 2.4*  --   
SGOT 48*  --  49* 58*  --   
ALT 94*  --  107* 114*  --   
INR 1.3*  --   --   --   --   
 
Recent Labs 10/29/19 
1508 10/28/19 
5046 10/27/19 
0421 10/26/19 
1533 WBC 4.4 5.0 4.1 4.6 HGB 9.8* 10.0* 10.0* 9.7* HCT 31.3* 31.9* 31.7* 30.6*  88* 106* 167 No results found for: SDES Lab Results Component Value Date/Time Culture result: NO GROWTH 3 DAYS 10/25/2019 07:14 PM  
 Culture result: ENTEROBACTER CLOACAE (A) 06/26/2019 12:25 PM  
 Culture result: NO GROWTH 1 DAY 06/13/2019 10:44 AM  
 Culture result: ENTEROBACTER CLOACAE (A) 06/04/2019 04:27 AM  
 Culture result: NO GROWTH 5 DAYS 06/04/2019 04:09 AM  
 
Recent Results (from the past 24 hour(s)) ANKLE BRACHIAL INDEX Collection Time: 10/28/19 12:48 PM  
Result Value Ref Range Left anterior tibial 120 mmHg Left posterior tibial 120 mmHg Right anterior tibial 120 mmHg Right posterior tibial 117 mmHg Left arm  mmHg Left JOAQUÍN 1.20 Right JOAQUÍN 1.20 CORTISOL Collection Time: 10/28/19 12:49 PM  
Result Value Ref Range Cortisol, random 22.4 ug/dL LACTIC ACID Collection Time: 10/28/19 12:49 PM  
Result Value Ref Range Lactic acid 1.6 0.4 - 2.0 MMOL/L  
SED RATE (ESR) Collection Time: 10/28/19 12:49 PM  
Result Value Ref Range Sed rate, automated 30 (H) 0 - 20 mm/hr POTASSIUM Collection Time: 10/28/19 12:49 PM  
Result Value Ref Range Potassium 4.8 3.5 - 5.1 mmol/L PLATELETS, ALLOCATE Collection Time: 10/28/19  3:15 PM  
Result Value Ref Range Unit number [id number] Blood component type PLTPH LR,2 Unit division 00 Status of unit TRANSFUSED   
PTT Collection Time: 10/28/19  5:28 PM  
Result Value Ref Range aPTT 67.7 (H) 22.1 - 32.0 sec  
 aPTT, therapeutic range     58.0 - 77.0 SECS  
GLUCOSE, POC  Collection Time: 10/28/19  9:15 PM  
 Result Value Ref Range Glucose (POC) 146 (H) 65 - 100 mg/dL Performed by Omero Lindsay PTT Collection Time: 10/28/19 11:54 PM  
Result Value Ref Range aPTT 77.2 (H) 22.1 - 32.0 sec  
 aPTT, therapeutic range     58.0 - 35.1 SECS  
METABOLIC PANEL, COMPREHENSIVE Collection Time: 10/29/19  3:32 AM  
Result Value Ref Range Sodium 130 (L) 136 - 145 mmol/L Potassium 4.1 3.5 - 5.1 mmol/L Chloride 93 (L) 97 - 108 mmol/L  
 CO2 31 21 - 32 mmol/L Anion gap 6 5 - 15 mmol/L Glucose 96 65 - 100 mg/dL BUN 24 (H) 6 - 20 MG/DL Creatinine 1.59 (H) 0.70 - 1.30 MG/DL  
 BUN/Creatinine ratio 15 12 - 20 GFR est AA 53 (L) >60 ml/min/1.73m2 GFR est non-AA 43 (L) >60 ml/min/1.73m2 Calcium 8.9 8.5 - 10.1 MG/DL Bilirubin, total 1.8 (H) 0.2 - 1.0 MG/DL  
 ALT (SGPT) 94 (H) 12 - 78 U/L  
 AST (SGOT) 48 (H) 15 - 37 U/L Alk. phosphatase 136 (H) 45 - 117 U/L Protein, total 6.1 (L) 6.4 - 8.2 g/dL Albumin 2.4 (L) 3.5 - 5.0 g/dL Globulin 3.7 2.0 - 4.0 g/dL A-G Ratio 0.6 (L) 1.1 - 2.2    
CBC W/O DIFF Collection Time: 10/29/19  3:32 AM  
Result Value Ref Range WBC 4.4 4.1 - 11.1 K/uL  
 RBC 3.42 (L) 4.10 - 5.70 M/uL HGB 9.8 (L) 12.1 - 17.0 g/dL HCT 31.3 (L) 36.6 - 50.3 % MCV 91.5 80.0 - 99.0 FL  
 MCH 28.7 26.0 - 34.0 PG  
 MCHC 31.3 30.0 - 36.5 g/dL  
 RDW 17.2 (H) 11.5 - 14.5 % PLATELET 123 161 - 975 K/uL MPV 10.4 8.9 - 12.9 FL  
 NRBC 0.0 0  WBC ABSOLUTE NRBC 0.00 0.00 - 0.01 K/uL NT-PRO BNP Collection Time: 10/29/19  3:32 AM  
Result Value Ref Range NT pro-BNP 10,239 (H) <125 PG/ML  
MAGNESIUM Collection Time: 10/29/19  3:32 AM  
Result Value Ref Range Magnesium 2.2 1.6 - 2.4 mg/dL PROTHROMBIN TIME + INR Collection Time: 10/29/19  3:32 AM  
Result Value Ref Range INR 1.3 (H) 0.9 - 1.1 Prothrombin time 13.0 (H) 9.0 - 11.1 sec PTT Collection Time: 10/29/19  3:32 AM  
Result Value Ref Range aPTT 63.9 (H) 22.1 - 32.0 sec  
 aPTT, therapeutic range     58.0 - 77.0 SECS  
POC VENOUS BLOOD GAS Collection Time: 10/29/19  4:03 AM  
Result Value Ref Range Device: NASAL CANNULA Flow rate (POC) 2 L/M  
 pH, venous (POC) 7.463 (H) 7.32 - 7.42    
 pCO2, venous (POC) 40.4 (L) 41 - 51 MMHG  
 pO2, venous (POC) 37 25 - 40 mmHg HCO3, venous (POC) 28.9 (H) 23.0 - 28.0 MMOL/L  
 sO2, venous (POC) 74 65 - 88 % Base excess, venous (POC) 5 mmol/L Allens test (POC) N/A Total resp. rate 21 Site SWAN [employer] Specimen type (POC) MIXED VENOUS    
TYPE + CROSSMATCH Collection Time: 10/29/19  7:50 AM  
Result Value Ref Range Crossmatch Expiration 11/01/2019 ABO/Rh(D) O POSITIVE Antibody screen POS Antibody ID PENDING Total time spent with patient:  xxx   min. Care Plan discussed with: 
Patient Family RN Consulting Physician [address] [place] [address]     
 
I have reviewed the flowsheets. Chart and Pertinent Notes have been reviewed. No change in PMH ,family and social history from Consult note.  
 
 
Ivonne Hammans, MD

## 2019-10-29 NOTE — PROGRESS NOTES
Occupational Therapy Chart reviewed. Pt is currently off the floor for Impella placement. Will follow. Thank you.  Bright Roe OT

## 2019-10-29 NOTE — ANESTHESIA PREPROCEDURE EVALUATION
Relevant Problems No relevant active problems Anesthetic History No history of anesthetic complications Review of Systems / Medical History Patient summary reviewed, nursing notes reviewed and pertinent labs reviewed Pulmonary Sleep apnea: CPAP Neuro/Psych Within defined limits Cardiovascular Hypertension CHF: NYHA Classification IV, orthopnea, PND and dyspnea on exertion Dysrhythmias : atrial fibrillation CAD and CABG Exercise tolerance: <4 METS Comments: EF 10%, on inotropes S/p Sternectomy for sternal wound infection GI/Hepatic/Renal 
  
 
 
Renal disease: CRI Endo/Other Arthritis Other Findings Physical Exam 
 
Airway Mallampati: II 
TM Distance: > 6 cm Neck ROM: normal range of motion Mouth opening: Normal 
 
 Cardiovascular Rhythm: irregular Rate: normal 
 
 
 
 Dental 
 
Dentition: Full lower dentures and Full upper dentures Pulmonary Breath sounds clear to auscultation Abdominal 
GI exam deferred Other Findings Anesthetic Plan ASA: 4 Anesthesia type: general 
 
Monitoring Plan: Arterial line, BIS, CVP, [place] and JACQUE Post procedure ventilation Induction: Intravenous Anesthetic plan and risks discussed with: Patient

## 2019-10-29 NOTE — PROGRESS NOTES
2000 Received report from Vijaya and assumed care. VSS, denies pain. 2230 Complete chlorhexidine bed bath provided, tolerated well. Refuses to wear cpap. 
0000 PTT drawn. 0345 Labs drawn. 0400 [place] re-calibrated. 0430 Portable chest xray completed. 0600 Complete chlorhexidine bed bath completed. 0730 Bedside and Verbal shift change report given to Vijaya (oncoming nurse) by Jeronimo Blood (offgoing nurse). Report included the following information SBAR, Kardex, Intake/Output, MAR, Recent Results and Alarm Parameters .

## 2019-10-29 NOTE — PROGRESS NOTES
[facility] Progress Note DOS:  10/29/2019 REFERRING PROVIDER:  Dottie Stoll MD 
PRIMARY CARE PHYSICIAN: Rafael Davis MD 
PRIMARY CARDIOLOGIST: Dottie Stoll MD 
 
 
 
CC: nohemy DOUGLAS HPI: Cornel Silverio is a 71y.o. year old pleasant white male with a history of HTN, HLD, JOSE, CAD s/p cardiac arrest VFib s/p CABG (2011) c/b sternal would infection and sternectomy, ischemic cardiomyopathy LVEF 15-20%, s/p ICD and with LBBB. He was admitted with acute on chronic systolic heart failure with massive volume overload > 20 lbs, in the setting of atrial fibrillation s/p failed DCCV and underwent DCCV and RHC on 6/18. S/p BiVICD on 6/21/19 with Dr. Lizette Alvarado. He was discharged home home on IV milrinone on 6/26/19. He has been followed closely by Dr. Elen Shah and the [facility]. Mr. Sabina Gardner returns to clinic for follow up with his wife. His dyspnea has progressed to the point he is sitting in a chair most of the day. He is unable to perform simple ADLs without limiting dyspnea. His appetite is down. He underwent a sleep study and is using CPAP but has difficulty due to frequent nocturia. He denies presyncope or syncope. He has had a few falls due to generalized weakness but did not hit his head nor lose consciousness. Recent labs reveal stable creatinine from 1.9 - 2.1. He is currently taking torsemide 60 mg bid. His wife has noticed an odor to his urine. He denies fever/chills but admits to feeling cold all the time. Recent Events: 
Negative fluid balance In OR for Impella Remains on dual inotropes Platelets transfused IMPRESSION/PLAN: 
 Acute on chronic systolic heart failure 
 ICM, Stage D, NYHA Class IV, LVEF 16-20%, s/p CRT on 6/21/19 S/p Impella 5.0 implant 10/29 CRT non-responder Intolerant to RAASi d/t renal dysfunction Off BBrx while on dobutamine Continue IV milrinone to 0.3 mcgs/kg/min Cont Dobutamine 2.5mcg/kg/min Cont IV bumex infusion to 0.5 mg/hour Continue spironolactone Trend PBNP, CMP Strict I/O Daily Weights Follow renal function and electrolytes closely Eval in process: Needs Chest CT 
  GI eval- 10 years since last colonoscopy No dental needed- has dentures Continue pre- op education History of VF arrest - s/p AICD No recent shocks Thrombocytopenia Plt improved s/p platelet transfusion CAD s/p CABG in 2010 Needs C- timing TBD 
 
PAF s/p DCCV 6/18/19 Hold eliquis Continue IV heparin for CVA prophylaxis Continue amiodarone Chronic Kidney Disease 3 - single kidney Nephrology following Renal US- no obstructive disease History of Urinary Retention UA negative Lizama cath placed JOSE on CPAP When extubated, will use home CPAP machine History of Sternal wound infection requiring sternectomy Stable CV surgery aware - not a contraindication for LVAD Check tagged WBC- pending ESR 30 Cortisol 22.4 Anemia, iron deficiency Iron sat 9% S/p Venofer H/o DVT IV heparin Migraines 
 stable H/o tobacco abuse Counseling - continued abstinence Depression On lexapro Anorexia - likely multifactorial (depression, congestive hepatopathy) Prealbumin 13.8 Prealbumin weekly Nutritional supplements Mediastinal Lymphadenopathy D.w with Dr. Natalee Hanley- does not think this is lung CA, patient is too high risk for lung biopsy Unable to do PET scan until next week will DC Recommend to repeat Chest CT Vocal Cord Paralysis Speech therapy recs appreciated Aspiration Precautions Thickened liquids when able to take PO 
  
 
  
CARDIAC EVALUATION 
ECHO (5/31/19) LVEF 21-25%, severely dilated LA, moderately dilated RA 
ECHO (6/24/19) LVEF 16-20%, Severely dilated LV, trace MR, trace TR 
 
EKG (6/12/19) atrial flutter with occasional PVCs, LBBB QRS 158ms EKG (6/26/19) Atrial fibrillation with premature ventricular or aberrantly conducted complexes, Left bundle branch block, rate 86, , QTc [facility] not in epic Review of Systems:    
Review of Systems Unable to perform ROS: Acuity of condition Physical Exam:  
 
Visit Vitals BP 99/90 Pulse 97 Temp 98.2 °F (36.8 °C) Resp 28 Ht 6' 2\" (1.88 m) Wt 183 lb (83 kg) SpO2 93% BMI 23.50 kg/m² Hemodynamics: 
 CO: CO (l/min): 5.3 l/min CI: CI (l/min/m2): 1.8 l/min/m2 CVP: CVP (mmHg): 8 mmHg (10/29/19 0600) SVR: SVR (dyne*sec)/cm5: 1339 (dyne*sec)/cm5 (10/29/19 0400) PAP Systolic: PAP Systolic: 51 (43/71/47 9656) PAP Diastolic: PAP Diastolic: 24 (35/70/79 0553) PVR:   
 SV02:   
 SCV02: SCVO2 (%): 84 % (10/29/19 0600) Physical Exam  
Constitutional: He is well-developed, well-nourished, and in no distress. He appears unhealthy. He appears cachectic. No distress. HENT:  
Head: Normocephalic. Eyes: Pupils are equal, round, and reactive to light. Neck: Normal range of motion. Neck supple. JVD present. Cardiovascular: Normal rate, regular rhythm, normal heart sounds and intact distal pulses. No murmur heard. Pulmonary/Chest: Effort normal and breath sounds normal. No respiratory distress. Intubated and on vent Abdominal: Soft. Bowel sounds are normal. He exhibits no distension. Musculoskeletal: Normal range of motion. He exhibits no edema. Neurological: Intubated and sedated Skin: Skin is warm and dry. Nursing note and vitals reviewed. History: 
Past Medical History:  
Diagnosis Date  Degenerative disc disease, lumbar  Heart failure ([address]  High cholesterol  Hypertension  Paroxysmal atrial fibrillation ([address] 4/2/2019  Spinal stenosis Past Surgical History:  
Procedure Laterality Date  HX APPENDECTOMY  HX CORONARY ARTERY BYPASS GRAFT    
 triple  HX HERNIA REPAIR    
 HX IMPLANTABLE CARDIOVERTER DEFIBRILLATOR  [place] CARDIOVERSION ELECTIVE ARRHYTHMIA EXTERNAL N/A 6/10/2019 EP CARDIOVERSION performed by Tammie Russell MD at [address], [facility]/s Dr CATH LAB  [place] CARDIOVERSION ELECTIVE ARRHYTHMIA EXTERNAL N/A 2019 EP CARDIOVERSION performed by Kimani De Los Santos MD at [address], [facility] Dr CATH LAB  [place] INSJ/RPLCMT PERM DFB W/TRNSVNS LDS 1/DUAL CHMBR N/A 2019 INSERT ICD BIV MULTI performed by Say Fowler MD at [address], [facility]/s  CATH LAB  [place] TCAT IMPL WRLS P-ART PRS SNR L-T HEMODYN MNTR N/A 2019 IMPLANT HEART FAILURE MONITORING DEVICE performed by Kaur Pastrana MD at [address], [facility]/s  CATH LAB Social History Socioeconomic History  Marital status:  Spouse name: Not on file  Number of children: Not on file  Years of education: Not on file  Highest education level: Not on file Occupational History  Not on file Social Needs  Financial resource strain: Not on file  Food insecurity:  
  Worry: Not on file Inability: Not on file  Transportation needs:  
  Medical: Not on file Non-medical: Not on file Tobacco Use  Smoking status: Former Smoker Last attempt to quit: 2010 Years since quittin.9  Smokeless tobacco: Never Used Substance and Sexual Activity  Alcohol use: Not Currently Comment: rarely  Drug use: Never  Sexual activity: Not on file Lifestyle  Physical activity:  
  Days per week: Not on file Minutes per session: Not on file  Stress: Not on file Relationships  Social connections:  
  Talks on phone: Not on file Gets together: Not on file Attends [employer] service: Not on file Active member of club or organization: Not on file Attends meetings of clubs or organizations: Not on file Relationship status: Not on file  Intimate partner violence:  
  Fear of current or ex partner: Not on file Emotionally abused: Not on file Physically abused: Not on file Forced sexual activity: Not on file Other Topics Concern [address] Service Not Asked  Blood Transfusions Not Asked  Caffeine Concern Not Asked  Occupational Exposure Not Asked [place] Hazards Not Asked  Sleep Concern Not Asked  Stress Concern Not Asked  Weight Concern Not Asked  Special Diet Not Asked  Back Care Not Asked  Exercise Not Asked  Bike Helmet Not Asked  Seat Belt Not Asked  Self-Exams Not Asked Social History Narrative  Not on file Family History Problem Relation Age of Onset  Lung Disease Mother  Hypertension Mother [address] Arthritis-osteo Mother  Heart Disease Mother  Heart Disease Father  Diabetes Father Current Medications:  
Current Facility-Administered Medications Medication Dose Route Frequency Provider Last Rate Last Dose  dexmedeTOMidine (PRECEDEX) 400 mcg in 0.9% sodium chloride 100 mL infusion  0.2-0.7 mcg/kg/hr IntraVENous TITRATE Jose Elias DIOR NP 12.5 mL/hr at 10/29/19 1208 0.6 mcg/kg/hr at 10/29/19 1208  dextrose 5% infusion  4-20 mL/hr IntraVENous CONTINUOUS Jose Elias DIOR NP 18.1 mL/hr at 10/29/19 1204 18.1 mL/hr at 10/29/19 1204  bivalirudin (ANGIOMAX) 250 mg in dextrose 5% 250 mL infusion  4-20 mL/hr Other TITRATE Dona Moreira NP   Stopped at 10/29/19 1100  
 alteplase (CATHFLO) 1 mg in sterile water (preservative free) 1 mL injection  1 mg InterCATHeter PRN Dona Moreira NP      
 bacitracin 500 unit/gram packet 1 Packet  1 Packet Topical PRN Dona Moreira NP      
 sodium chloride (NS) flush 5-40 mL  5-40 mL IntraVENous Q8H Jose Elias DIOR NP      
 sodium chloride (NS) flush 5-40 mL  5-40 mL IntraVENous PRN Dona Moreira NP      
 albumin human 5% (BUMINATE) solution 12.5 g  12.5 g IntraVENous Q2H PRN Dona Moreira NP      
 0.45% sodium chloride infusion  10 mL/hr IntraVENous CONTINUOUS Dona Moreira NP      
 0.9% sodium chloride infusion  9 mL/hr IntraVENous CONTINUOUS Karle Sacks, Nydia Carbone, TIERRA      
 acetaminophen (TYLENOL) tablet 650 mg  650 mg Oral Q4H Ortiz Purchase D, NP   Stopped at 10/29/19 1100  
 oxyCODONE IR (ROXICODONE) tablet 5 mg  5 mg Oral Q4H PRN Karon Desouza, NP      
 oxyCODONE IR (ROXICODONE) tablet 10 mg  10 mg Oral Q4H PRN Karon Desouza, NP      
 morphine injection 4 mg  4 mg IntraVENous Q2H PRN Karon Desouza, NP      
 naloxone [facility]) injection 0.4 mg  0.4 mg IntraVENous PRN Karon Keelyt, NP      
 mupirocin (BACTROBAN) 2 % ointment   Both Nostrils BID Karon Lyly, NP      
 [facility] injection 4 mg  4 mg IntraVENous Q4H PRN Karon Lyly, NP      
 albuterol (PROVENTIL VENTOLIN) nebulizer solution 2.5 mg  2.5 mg Nebulization Q4H PRN Karon Lyly, NP      
 midazolam (VERSED) injection 1 mg  1 mg IntraVENous Q1H PRN Karon Lyly, NP      
 chlorhexidine (PERIDEX) 0.12 % mouthwash 10 mL  10 mL Oral Q12H Karon Desouza, NP      
 famotidine (PF) (PEPCID) 20 mg in sodium chloride 0.9% 10 mL injection  20 mg IntraVENous Q12H Ortiz Purchase D, NP   20 mg at 10/29/19 1146  
 [START ON 10/30/2019] famotidine (PEPCID) tablet 20 mg  20 mg Oral Q12H TIERRA Flores ON 10/30/2019] magnesium oxide (MAG-OX) tablet 400 mg  400 mg Oral BID Karon Desouza NP      
 calcium chloride 1 g in 0.9% sodium chloride 250 mL IVPB  1 g IntraVENous PRN Karon Desouza, NP      
 bisacodyl (DULCOLAX) suppository 10 mg  10 mg Rectal DAILY PRN Karon Desouza, NP      
 [START ON 10/30/2019] senna-docusate (PERICOLACE) 8.6-50 mg per tablet 1 Tab  1 Tab Oral BID Karon Desouza, TIERRA      
 [START ON 10/30/2019] polyethylene glycol (MIRALAX) packet 17 g  17 g Oral DAILY Karon Feast, NP      
 ELECTROLYTE REPLACEMENT NOTE: Nurse to review Serum Potassium and Magnesuim levels and Initiate Electrolyte Replacement Protocol as needed 1 Each Other PRN Manju Milian NP      
 magnesium sulfate 1 g/100 ml IVPB (premix or compounded)  1 g IntraVENous PRN Manju Milian NP      
 glucose chewable tablet 16 g  4 Tab Oral PRN Manju Milian NP      
 glucagon [facility]) injection 1 mg  1 mg IntraMUSCular PRN Manju Milian NP      
 insulin lispro (HUMALOG) injection   SubCUTAneous TIDAC Manju Milian NP      
 insulin lispro (HUMALOG) injection   SubCUTAneous AC&HS Manju Milian NP   2 Units at 10/29/19 1202  
 insulin glargine (LANTUS) injection 1-50 Units  1-50 Units SubCUTAneous ONCE PRN Manju Milian NP      
 dextrose 10% infusion 0-250 mL  0-250 mL IntraVENous PRN Manju Milian NP      
 morphine injection 2 mg  2 mg IntraVENous Q2H PRN Manju Milian NP      
 potassium chloride 20 mEq in 50 ml IVPB  20 mEq IntraVENous Q2H PRN Manju Milian NP      
 potassium chloride 20 mEq in 50 ml IVPB  20 mEq IntraVENous Q2H PRN Manju Milian NP      
 melatonin tablet 3 mg  3 mg Oral QHS PRN Manju Milian NP      
 succinylcholine (ANECTINE) 20 mg/mL injection  succinylcholine (ANECTINE) injection 100 mg  100 mg IntraVENous NOW Mario Hicks MD      
 propofol (DIPRIVAN) infusion  0-50 mcg/kg/min IntraVENous TITRATE Mario Hicks MD 5 mL/hr at 10/29/19 1233 10 mcg/kg/min at 10/29/19 1233  ceFAZolin (ANCEF) 2 g/20 mL in sterile water IV syringe  2 g IntraVENous Q8H Senait DIOR NP      
 spironolactone (ALDACTONE) tablet 25 mg  25 mg Oral DAILY Manju Milian NP      
 DOBUTamine (DOBUTREX) 500 mg/250 mL (2,000 mcg/mL) infusion  2.5 mcg/kg/min IntraVENous TITRATE Senait DIOR NP   Stopped at 10/29/19 1031  
 milrinone (PRIMACOR) 20 MG/100 ML D5W infusion  0.3 mcg/kg/min IntraVENous CONTINUOUS Senait DIOR NP 7.6 mL/hr at 10/29/19 0729 0.3 mcg/kg/min at 10/29/19 1789  amiodarone (CORDARONE) tablet 100 mg  100 mg Oral BID Jerrica DIOR NP   100 mg at 10/28/19 1845  influenza vaccine 2019-20 (6 mos+)(PF) (FLUARIX/FLULAVAL/FLUZONE QUAD) injection 0.5 mL  0.5 mL IntraMUSCular PRIOR TO DISCHARGE Lakesha Lockhart NP      
 aspirin delayed-release tablet 81 mg  81 mg Oral DAILY eJrrica DIOR NP   81 mg at 10/28/19 0829  
 bumetanide (BUMEX) 0.25 mg/mL infusion  0.5 mg/hr IntraVENous CONTINUOUS Jerrica DIOR NP 2 mL/hr at 10/29/19 1158 0.5 mg/hr at 10/29/19 1158  tamsulosin (FLOMAX) capsule 0.8 mg  0.8 mg Oral DAILY Jerrica DIOR NP   0.8 mg at 10/28/19 1139  allopurinol (ZYLOPRIM) tablet 100 mg  100 mg Oral DAILY Lakesha Lockhart NP   Stopped at 10/29/19 0900 Allergies: No Known Allergies Recent Labs:  
Labs Latest Ref Rng & Units 10/29/2019 10/28/2019 10/28/2019 10/27/2019 10/26/2019 10/26/2019 10/26/2019 WBC 4.1 - 11.1 K/uL 4.4 - 5.0 4.1 4.6 - 4.2  
RBC 4.10 - 5.70 M/uL 3.42(L) - 3.48(L) 3.48(L) 3.39(L) - 3.35(L) Hemoglobin 12.1 - 17.0 g/dL 9.8(L) - 10. 0(L) 10. 0(L) 9.7(L) - 9. 8(L) Hematocrit 36.6 - 50.3 % 31. 3(L) - 31. 9(L) 31. 7(L) 30. 6(L) - 30. 2(L) MCV 80.0 - 99.0 FL 91.5 - 91.7 91.1 90.3 - 90.1 Platelets 958 - 250 K/uL 152 - 88(L) 106(L) 167 - 156 Lymphocytes 12 - 49 % - - - - 9(L) - - Monocytes 5 - 13 % - - - - 12 - - Eosinophils 0 - 7 % - - - - 1 - - Basophils 0 - 1 % - - - - 0 - - Albumin 3.5 - 5.0 g/dL 2. 4(L) - 2. 4(L) 2. 4(L) - - 2. 5(L) Calcium 8.5 - 10.1 MG/DL 8.9 - 8.8 7. 9(L) - - 8. 2(L) SGOT 15 - 37 U/L 48(H) - 49(H) 58(H) - - 90(H) Glucose 65 - 100 mg/dL 96 - 110(H) 102(H) - - 259(H) BUN 6 - 20 MG/DL 24(H) - 20 23(H) - - 30(H) Creatinine 0.70 - 1.30 MG/DL 1.59(H) - 1.59(H) 1.61(H) - - 1.86(H) Sodium 136 - 145 mmol/L 130(L) - 132(L) 132(L) - - 124(L) Potassium 3.5 - 5.1 mmol/L 4.1 4.8 4.7 3. 2(L) - 3.7 3. 1(L) TSH 0.36 - 3.74 uIU/mL - - - - - - -  
PSA 0.01 - 4.0 ng/mL - - - - - - -  
 LDH 85 - 241 U/L - - - - - - - Some recent data might be hidden Lab Results Component Value Date/Time WBC 4.4 10/29/2019 03:32 AM  
 HGB 9.8 (L) 10/29/2019 03:32 AM  
 HCT 31.3 (L) 10/29/2019 03:32 AM  
 PLATELET 861 37/39/6438 03:32 AM  
 MCV 91.5 10/29/2019 03:32 AM  
 
Lab Results Component Value Date/Time GFR est non-AA 43 (L) 10/29/2019 03:32 AM  
 GFR est AA 53 (L) 10/29/2019 03:32 AM  
 Creatinine 1.59 (H) 10/29/2019 03:32 AM  
 BUN 24 (H) 10/29/2019 03:32 AM  
 Sodium 130 (L) 10/29/2019 03:32 AM  
 Potassium 4.1 10/29/2019 03:32 AM  
 Chloride 93 (L) 10/29/2019 03:32 AM  
 CO2 31 10/29/2019 03:32 AM  
 Magnesium 2.2 10/29/2019 03:32 AM  
 Phosphorus 2.4 (L) 06/04/2019 04:09 AM  
 
Lab Results Component Value Date/Time TSH 2.40 10/25/2019 07:39 PM  
 T4, Free 1.7 (H) 10/25/2019 07:39 PM  
  
Lab Results Component Value Date/Time Sodium 130 (L) 10/29/2019 03:32 AM  
 Potassium 4.1 10/29/2019 03:32 AM  
 Chloride 93 (L) 10/29/2019 03:32 AM  
 CO2 31 10/29/2019 03:32 AM  
 Anion gap 6 10/29/2019 03:32 AM  
 Glucose 96 10/29/2019 03:32 AM  
 BUN 24 (H) 10/29/2019 03:32 AM  
 Creatinine 1.59 (H) 10/29/2019 03:32 AM  
 BUN/Creatinine ratio 15 10/29/2019 03:32 AM  
 GFR est AA 53 (L) 10/29/2019 03:32 AM  
 GFR est non-AA 43 (L) 10/29/2019 03:32 AM  
 Calcium 8.9 10/29/2019 03:32 AM  
 Bilirubin, total 1.8 (H) 10/29/2019 03:32 AM  
 ALT (SGPT) 94 (H) 10/29/2019 03:32 AM  
 AST (SGOT) 48 (H) 10/29/2019 03:32 AM  
 Alk. phosphatase 136 (H) 10/29/2019 03:32 AM  
 Protein, total 6.1 (L) 10/29/2019 03:32 AM  
 Albumin 2.4 (L) 10/29/2019 03:32 AM  
 Globulin 3.7 10/29/2019 03:32 AM  
 A-G Ratio 0.6 (L) 10/29/2019 03:32 AM  
  
Lab Results Component Value Date/Time Hemoglobin A1c 6.6 (H) 10/25/2019 02:56 PM  
  
 
 
Thank you for letting us see him with you, TIERRA Overton [address], [address] [facility], [address] [place] [address] Office [phone] Fax [phone] [facility] ATTENDING ADDENDUM Patient was seen and examined in person. Data and notes were reviewed. I have discussed and agree with the plan as noted in the NP note above without further additions. Ardyth Bernheim, MD PhD 
[address]

## 2019-10-29 NOTE — PROGRESS NOTES
NUTRITION COMPLETE ASSESSMENT 
 
RECOMMENDATIONS:  
Check phosphorus Once extubated-advance ROYER and add Glucerna shake bid If unable to extubate consider starting enteral nutrition support Interventions/Plan:  
Food/Nutrient Delivery:  NPO Assessment:  
Reason for Assessment:  
[x] Provider Consult-General nutrition management and supplements [x]BPA/MST Referral, 14-23# wt loss and decreased appetite Diet: NPO Supplements: none Nutritionally Significant Medications: [x] Reviewed & Includes: correction scale insulin, magnesium oxide, Miralax, Pericolace, Aldactone, KCL Meal Intake:  
Patient Vitals for the past 100 hrs: 
 % Diet Eaten 10/28/19 1700 15 % 10/28/19 1300 120 % 10/27/19 1800 25 % 10/27/19 1304 100 % 10/27/19 1000 50 % 10/26/19 1748 100 % 10/26/19 1400 75 % 10/26/19 0900 100 % 10/25/19 1830 0 % Subjective: 
Pt intubated. Objective: 
Mr Jamarcus Harris was admitted with acute decompensated heart failure. PMHx: HTN, CKD, chronic systolic heart failure, depression, others noted. Noted: acute on chronic systolic systolic heart failure, Ischemic CM-EF 10%, Impella placed today-failed extubation; undergoing LVAD work-up; mediastinal lymphadenopathy-?malignancy; CKD IV and hyponatremia (volume overload); VCP-ENT and SLP following. Patient has had significant weight loss in past 6 months of at least 22# (10%). Noted B/L temporal and some clavicular muscle wasting and loss of SQ orbital fat. Unable to speak with wife this afternoon-unclear cause of weight loss. RN notes plan to extubate tomorrow. Recommend starting enteral feedings if pt fails extubation. Otherwise advance diet as tolerated and add Glucerna shake bid. RD to follow for preferences, diet history,etc. 
 
A1c elevated-?new dx of DM 2. Patient being diuresed-sodium remains BNL. Patient meets criteria for Moderate Protein Calorie Malnutrition as evidenced by:  
ASPEN Malnutrition Criteria Acute Illness, Chronic Illness, or Social/Enviornmental: Acute illness Weight Loss: Greater than 7.5% x 3 mos Body Fat Loss: Mild Muscle Mass Loss: Mild Fluid Accumulation: Mild ASPEN Malnutrition Score - Acute Illness: 9 Acute Illness - Malnutrition Diagnosis: Moderate malnutrition. Estimated Nutrition Needs:  
Kcals/day: 2000 Kcals/day([id number] (MSJ x 1.1-1.2) Protein: 100 g(1.2g/kg) Fluid: (1 ml/kcal) Based On: Luis Dominguezio Weight Used: Actual wt(83 kg) Pt expected to meet estimated nutrient needs:  []   Yes     []  No  [x] Unable to predict at this time Nutrition Diagnosis:  
1. Unintended weight loss related to CHF vs depression vs malignancy as evidenced by >10% weight loss x 6 months. Goals:   
 Consider nutriiton support if unable to extubate in next 24 hr; if extubated-advance diet with ONS. Monitoring & Evaluation: - Total energy intake, Protein intake - Weight/weight change, Electrolyte and renal profile Previous Nutrition Goals Met: N/A Previous Recommendations: N/A Education & Discharge Needs: 
 [x] None Identified 
 [] Identified and addressed [x] Participated in care plan, discharge planning, and/or interdisciplinary rounds Cultural, Denominational and ethnic food preferences identified: 
 None Skin Integrity: [x]Intact  []Other Edema: []None [x] 1+ generalized and BLE's, Trace BUE's Last BM: 10/25 Food Allergies: [x]None []Other Anthropometrics:   
Weight Loss Metrics 10/29/2019 10/25/2019 10/25/2019 10/25/2019 9/30/2019 9/18/2019 9/16/2019 Today's Wt 183 lb - 193 lb 6.4 oz - 199 lb 14.4 oz 196 lb 199 lb BMI - 23.5 kg/m2 - 24.83 kg/m2 25.67 kg/m2 25.16 kg/m2 25.55 kg/m2 Last 3 Recorded Weights in this Encounter 10/29/19 0400 10/29/19 1016 10/29/19 1155 Weight: 83.4 kg (183 lb 13.8 oz) 83 kg (183 lb) 83 kg (183 lb) Weight Source: Bed Height: 6' 2\" (188 cm), Body mass index is 23.5 kg/m². IBW : 86.2 kg (190 lb), % IBW (Calculated): 96.32 % 
 ,   
 
Labs:   
Lab Results Component Value Date/Time Sodium 130 (L) 10/29/2019 03:32 AM  
 Potassium 4.1 10/29/2019 03:32 AM  
 Chloride 93 (L) 10/29/2019 03:32 AM  
 CO2 31 10/29/2019 03:32 AM  
 Glucose 96 10/29/2019 03:32 AM  
 BUN 24 (H) 10/29/2019 03:32 AM  
 Creatinine 1.59 (H) 10/29/2019 03:32 AM  
 Calcium 8.9 10/29/2019 03:32 AM  
 Magnesium 2.2 10/29/2019 03:32 AM  
 Phosphorus 2.4 (L) 06/04/2019 04:09 AM  
 Albumin 2.4 (L) 10/29/2019 03:32 AM  
 
Lab Results Component Value Date/Time Hemoglobin A1c 6.6 (H) 10/25/2019 02:56 PM  
 
Lab Results Component Value Date/Time Glucose (POC) 182 (H) 10/29/2019 11:54 AM  
  
Lab Results Component Value Date/Time ALT (SGPT) 94 (H) 10/29/2019 03:32 AM  
 AST (SGOT) 48 (H) 10/29/2019 03:32 AM  
 Alk.  phosphatase 136 (H) 10/29/2019 03:32 AM  
 Bilirubin, total 1.8 (H) 10/29/2019 03:32 AM  
  
 
Nadeem Celis RD [facility]

## 2019-10-29 NOTE — ANESTHESIA POSTPROCEDURE EVALUATION
Post-Anesthesia Evaluation and Assessment Patient: Rosalina Muñiz MRN: [id number]  SSN: [id number] YOB: 1950  Age: 71 y.o. Sex: male I have evaluated the patient and they are stable and ready for discharge from the PACU. Cardiovascular Function/Vital Signs Visit Vitals BP 99/90 Pulse 97 Temp 36.8 °C (98.2 °F) Resp 28 Ht 6' 2\" (1.88 m) Wt 83 kg (183 lb) SpO2 93% BMI 23.50 kg/m² Patient is status post General anesthesia for Procedure(s): RIGHT AXILLARY IMPELLA INSERTION. Nausea/Vomiting: None Postoperative hydration reviewed and adequate. Pain: 
Pain Scale 1: Numeric (0 - 10) (10/29/19 0600) Pain Intensity 1: 0 (10/29/19 0600) Sedated Neurological Status:  
Neuro Neurologic State: Alert (10/28/19 2000) Orientation Level: Oriented X4 (10/28/19 2000) Cognition: Appropriate decision making; Appropriate for age attention/concentration; Appropriate safety awareness; Follows commands (10/28/19 2000) LLE Motor Response: Purposeful (10/28/19 2000) RLE Motor Response: Purposeful (10/28/19 2000) Sedated Mental Status, Level of Consciousness: Sedated Pulmonary Status:  
O2 Device: Ventilator (10/29/19 0950) Adequate oxygenation and airway patent, required reintubation fro early respiratory failure postop Complications related to anesthesia: None Post-anesthesia assessment completed. No concerns Signed By: Rosa Segovia MD   
 October 29, 2019 Procedure(s): RIGHT AXILLARY IMPELLA INSERTION. general 
 
<BSHSIANPOST> Vitals Value Taken Time BP Temp Pulse 96 10/29/2019 11:58 AM  
Resp 21 10/29/2019 11:58 AM  
SpO2 88 % 10/29/2019 11:53 AM  
Vitals shown include unvalidated device data.

## 2019-10-29 NOTE — PROGRESS NOTES
Reviewed chart and patient currently in the OR for Impella placement. Will follow later this week as appropriate. Thanks. Pema Dunn M.CD.  CCC-SLP

## 2019-10-30 NOTE — PROGRESS NOTES
BRIEF CARDIOLOGY NOTE I was asked by my colleague, Dr. Sebastian Short to perform diagnostic LHC on Mr. Severo Conger. I reviewed his chart and note that he underwent CABG in 2010. I was unable to find documentation of the location of his grafts. In light of his critical illness with cardiogenic shock requiring dual inotropic support and an Impella 5.0 via a right axillary approach, it would be best to know where his grafts are before proceeding with coronary angiography in order to minimize contrast dye (at high risk for contrast-induced nephropathy). I reviewed his recent CT chest done on 10/25/19 and it appears that he has a LIMA-LAD and SVG off of the left anterior portion of the ascending aorta that is likely going to a diagonal or OM. There does not appear to be a right-sided graft going to the RCA. Due to the non-gated nature of the study and an inability on my current workstation to do multiplanar reconstructions, I cannot determine further detail. Impression: 
Cardiogenic shock ESHF due to ischemic cardiomyopathy High risk for SILAS Recommendations: 
- Obtain documentation, cath report or op note of CABG done in 2010 to determine where his grafts are; ideally before coronary angiography is performed - If we need to proceed with coronary angiography more urgently, we will try to accomplish complete evaluation, without a priori knowledge of his graft location, using as little contrast dye as possible Thank you for the opportunity to participate in the care of Zay Wheeler and please do not hesitate to contact us should you have any questions. Nadia Dupont MD 
Structural / Interventional Cardiology [facility] 10/29/19

## 2019-10-30 NOTE — PROGRESS NOTES
6072 sbar received from christian rn 
[phone] pt on SBT 
[phone] spoke to ni MAYORGA. abg results read. Ok to extubate 9849 pt successfully extubated to 4L 
1041 Dr. Edgar Chu at bedside, cath set at 2:15 today 1300 Per Nuc Med test. Cath will be at 4. After WBC scan 1515 pt down to nuc med 1615 pt back to unit. Received orders that pt will not be having cath today 1755 PT bp dropped 70/40 (50) . Titrated dobutamine to 7.5. Paged Dr. Marjorie Reynaga. Received orders for 25% albumin, 500cc bolus and to titrate dobutamine for map >65. Vasopressin on stand by  
1928 pt vomiting peaches from dinner, prn zofran given 1930 sbar given to cat rn

## 2019-10-30 NOTE — PROGRESS NOTES
\[facility]\"" ICU Progress Note Admit Date: 10/25/2019 POD:  1 Day Post-Op Procedure:  Procedure(s): RIGHT AXILLARY IMPELLA INSERTION Subjective:  
Pt seen with Dr. Kaylen Shields. On milrinone and dobutamine. Low index overnight (1.5). Albumin given. On vent 40% FiO2. Objective:  
Vitals: 
Blood pressure (!) 78/75, pulse 86, temperature 98.4 °F (36.9 °C), resp. rate 20, height 6' 2\" (1.88 m), weight 175 lb 7.8 oz (79.6 kg), SpO2 98 %. Temp (24hrs), Av.1 °F (37.3 °C), Min:98.3 °F (36.8 °C), Max:100.1 °F (37.8 °C) Hemodynamics: 
 CO: CO (l/min): 5.4 l/min CI: CI (l/min/m2): 1.8 l/min/m2 CVP: CVP (mmHg): 7 mmHg (10/30/19 0700) SVR: SVR (dyne*sec)/cm5: 1102 (dyne*sec)/cm5 (10/30/19 0700) PAP Systolic: PAP Systolic: 36 (60/33/58 6810) PAP Diastolic: PAP Diastolic: 21 (41/29/59 9122) PVR:   
 SV02: SVO2 (%): 76 % (10/30/19 0700) SCV02: SCVO2 (%): 75 % (10/29/19 1900) EKG/Rhythm: NSR Ventilator: 
Ventilator Volumes Vt Set (ml): 450 ml (10/30/19 0315) Vt Exhaled (Machine Breath) (ml): 483 ml (10/30/19 0315) Vt Spont (ml): 299 ml (10/29/19 1020) Ve Observed (l/min): 9.17 l/min (10/30/19 0315) Oxygen Therapy: 
Oxygen Therapy O2 Sat (%): 98 % (10/30/19 0700) Pulse via Oximetry: 84 beats per minute (10/30/19 0700) O2 Device: Endotracheal tube (10/30/19 0400) O2 Flow Rate (L/min): 6 l/min (10/29/19 1036) FIO2 (%): 40 % (10/30/19 0400) CXR: 
 
Admission Weight: Last Weight Weight: 192 lb 10.9 oz (87.4 kg) Weight: 175 lb 7.8 oz (79.6 kg) Intake / Output / Drain: 
Current Shift: No intake/output data recorded. Last 24 hrs.:  
 
Intake/Output Summary (Last 24 hours) at 10/30/2019 3793 Last data filed at 10/30/2019 0700 Gross per 24 hour Intake 2581.28 ml Output 5915 ml Net -3333.72 ml EXAM: 
General:   NAD, sedated, intubated. Lungs:   Clear to auscultation bilaterally. Incision:  No erythema, drainage or swelling. Heart:  Regular rate and rhythm, S1, S2 normal, no murmur, click, rub or gallop. Abdomen:   Soft, non-tender. Bowel sounds normal. No masses,  No organomegaly. Extremities:  No edema. PPP. Neurologic:  Gross motor and sensory apparatus intact. Labs:  
Recent Labs 10/30/19 
8552 10/30/19 
0448 WBC  --  4.0* HGB  --  10.6* HCT  --  33.0*  
PLT  --  140* NA  --  132* K  --  3.2*  
BUN  --  28* CREA  --  1.88* GLU  --  108* GLUCPOC 112*  --   
INR  --  1.3* Assessment:  
 
Active Problems: 
  Acute decompensated heart failure ([facility] [address] (10/25/2019) Plan/Recommendations/Medical Decision Makin. Acute on chronic CHF Class IV S/P Impella: Cont mil and  per AHFS 2. Thrombocytopenia: Monitor 3. CAD S/p CABG : Needs [facility] 4. PAF: Cont anticoagulation 5. CKD stage III: Monitor. Renal following. 6. Dispo: Care and orders per AHFS, wean and extubate Signed By: MALCOLM Jo Saw patient, agree with above Risk of morbidity and mortality - high Medical decision making - high complexity 1. Acute on chronic systolic (CHF Class IV) S/P Impella: plan for LVAD 2. Thrombocytopenia: Platelets 62J. No asa. HIT panel sent. On protonix 3. CAD S/p CABG : Needs [facility], plans for next week w/ Dr. Scottie Martines. Stop asa d/t hematuria/thrombocytopenia, not on statin historically. No BB D/t pressors/intropes. 4. PAF s/p DCCV 2019: Holding eliquis due to hematuria/epistaxis. On PO amio. 5. CKD stage III: Monitor. Renal following. Diuretics PRN. 6. Hx of urinary retention/BPH:  On flomax. Keep Lizama. Lizama irrigation, may need urology consult per primary team 
 
7. JOSE: qhs CPAP. 8. Hx of sternal wound infection requring sternectomy: Not a contraindication for future LVAD. Tagged WBC pending. 9. Iron def anemia: s/p venofer. 10. Vocal cord paralysis:  Speech eval PRN. Advance diet as tolerated. 11. Constipation: On pericolace, miralax. PA ok. Completed reglan x 4 doses. Prn suppository Dispo: Care and orders per AHFS. Remain in CCU.

## 2019-10-30 NOTE — PROGRESS NOTES
0200: cardiac index remains less than 1.8. Dobutamine is max at 10 mcg/kg/min per order. Increased P level on impella to P8. 
 
0230: increased impella to P9 
 
0245: spoke with Constantino Berger, NP with [facility]- informed her of continued low cardiac index-1.4/1.5. Orders received to give Albumin, decrease impella to P8, and decrease Dobutamine to 7.5 mcg/kg/min 
 
0600: during hourly assessment pt noted to have hematuria. Cardiac Surgery paged. 0700: confirmed with [facility] that the patient WILL have a tagged white blood cell test today. Nuclear Medicine aware and will be up to draw blood at 8 am. Informed [facility] NP that the patient is having some hematuria. 0745: cardiac surgery team at bedside. Confirmed plan to extubate patient. They are aware that the patient's cardiac index remains <1.8 Bedside shift change report given to Sharyn Fuentes (oncoming nurse) by David Max (offgoing nurse). Report included the following information SBAR, Kardex, OR Summary, Procedure Summary, Intake/Output, MAR, Accordion, Recent Results, Med Rec Status and Cardiac Rhythm Paced.

## 2019-10-30 NOTE — PROGRESS NOTES
[facility] Progress Note DOS:  10/30/2019 REFERRING PROVIDER:  Louann Valdivia MD 
PRIMARY CARE PHYSICIAN: Kassidy Lane MD 
PRIMARY CARDIOLOGIST: Louann Valdivia MD 
 
 
 
CC: DOUGLAS, nohemy HPI: Anabel Herrera is a 71y.o. year old pleasant white male with a history of HTN, HLD, JOSE, CAD s/p cardiac arrest VFib s/p CABG (2011) c/b sternal would infection and sternectomy, ischemic cardiomyopathy LVEF 15-20%, s/p ICD and with LBBB. He was admitted with acute on chronic systolic heart failure with massive volume overload > 20 lbs, in the setting of atrial fibrillation s/p failed DCCV and underwent DCCV and RHC on 6/18. S/p BiVICD on 6/21/19 with Dr. Bandar Carlson. He was discharged home home on IV milrinone on 6/26/19. He has been followed closely by Dr. Ba Chavis and the [facility]. Mr. Fransisco Vazquez returns to clinic for follow up with his wife. His dyspnea has progressed to the point he is sitting in a chair most of the day. He is unable to perform simple ADLs without limiting dyspnea. His appetite is down. He underwent a sleep study and is using CPAP but has difficulty due to frequent nocturia. He denies presyncope or syncope. He has had a few falls due to generalized weakness but did not hit his head nor lose consciousness. Recent labs reveal stable creatinine from 1.9 - 2.1. He is currently taking torsemide 60 mg bid. His wife has noticed an odor to his urine. He denies fever/chills but admits to feeling cold all the time. Recent Events: 
Dobutamine started Volume given CI low Creatinine up IMPRESSION/PLAN: 
 Acute on chronic systolic heart failure 
 ICM, Stage D, NYHA Class IV, LVEF 16-20%, s/p CRT on 6/21/19 S/p Impella 5.0 implant 10/29 CRT non-responder Intolerant to RAASi d/t renal dysfunction Off BBrx while on dobutamine Continue IV milrinone to 0.4 mcgs/kg/min Decrease Dobutamine to 5mcg/kg/min Hold diuretics Albumin today, IVF x 24Hr Hold Spironolactone due to IVVD Trend PBNP, CMP Strict I/O Daily Weights Follow renal function and electrolytes closely Eval in process: Needs Chest CT- ordered Timing of colonoscopy, EGD early next week No dental needed- has dentures Continue pre- op education History of VF arrest - s/p AICD No recent shocks Thrombocytopenia Plt improved s/p platelet transfusion CAD s/p CABG in 2010 Needs C- timing TBD, hopefully early next week with Dr. James Barbosa PAF s/p DCCV 6/18/19 Hold eliquis Continue IV heparin for CVA prophylaxis Continue amiodarone Chronic Kidney Disease 3 - single kidney Nephrology following Renal US- no obstructive disease History of Urinary Retention UA negative Lizama cath placed JOSE on CPAP When extubated, will use home CPAP machine History of Sternal wound infection requiring sternectomy Stable CV surgery aware - not a contraindication for LVAD Check tagged WBC- pending ESR 30 Cortisol 22.4 Anemia, iron deficiency Iron sat 9% S/p Venofer H/o DVT IV heparin Migraines 
 stable H/o tobacco abuse Counseling - continued abstinence Depression On lexapro Anorexia - likely multifactorial (depression, congestive hepatopathy) Prealbumin 13.8 Prealbumin weekly Nutritional supplements Mediastinal Lymphadenopathy D.w with Dr. Leticia Hendricks- does not think this is lung CA, patient is too high risk for lung biopsy Unable to do PET scan until next week will DC Recommend to repeat Chest CT Vocal Cord Paralysis Speech therapy recs appreciated Aspiration Precautions Thickened liquids when able to take PO 
 
PPX Bowel regimen Ancef for Impella prophylaxis Advance diet as tolerated Purge Bivalrudin for now CARDIAC EVALUATION 
ECHO (5/31/19) LVEF 21-25%, severely dilated LA, moderately dilated RA 
 ECHO (6/24/19) LVEF 16-20%, Severely dilated LV, trace MR, trace TR 
 
EKG (6/12/19) atrial flutter with occasional PVCs, LBBB QRS 158ms EKG (6/26/19) Atrial fibrillation with premature ventricular or aberrantly conducted complexes, Left bundle branch block, rate 86, , QTc [facility] not in epic Review of Systems:    
Review of Systems Constitutional: Negative for chills, fever and malaise/fatigue. Feels thirsty Respiratory: Negative. Cardiovascular: Negative for chest pain and leg swelling. Gastrointestinal: Negative for heartburn, nausea and vomiting. Neurological: Positive for weakness. Negative for dizziness. Endo/Heme/Allergies: Does not bruise/bleed easily. Physical Exam:  
 
Visit Vitals BP (!) 78/75 (BP Patient Position: At rest) Pulse 73 Temp 98.4 °F (36.9 °C) Resp 12 Ht 6' 2\" (1.88 m) Wt 175 lb 7.8 oz (79.6 kg) SpO2 99% BMI 22.53 kg/m² Hemodynamics: 
 CO: CO (l/min): 6 l/min CI: CI (l/min/m2): 2 l/min/m2 CVP: CVP (mmHg): 3 mmHg (10/30/19 1100) SVR: SVR (dyne*sec)/cm5: 869 (dyne*sec)/cm5 (10/30/19 0800) PAP Systolic: PAP Systolic: 52 (56/37/90 4063) PAP Diastolic: PAP Diastolic: 14 (63/55/05 1454) PVR:   
 SV02: SVO2 (%): 70 % (10/30/19 1000) SCV02: SCVO2 (%): 75 % (10/29/19 1900) Physical Exam  
Constitutional: He is oriented to person, place, and time and well-developed, well-nourished, and in no distress. He appears unhealthy. He appears cachectic. No distress. HENT:  
Head: Normocephalic. Eyes: Pupils are equal, round, and reactive to light. Neck: Normal range of motion. Neck supple. JVD present. Cardiovascular: Normal rate, regular rhythm, normal heart sounds and intact distal pulses. No murmur heard. Pulmonary/Chest: Effort normal and breath sounds normal. No respiratory distress. Abdominal: Soft. Bowel sounds are normal. He exhibits no distension. Musculoskeletal: Normal range of motion. He exhibits no edema. Neurological: He is alert and oriented to person, place, and time. Skin: Skin is warm and dry. Nursing note and vitals reviewed. History: 
Past Medical History:  
Diagnosis Date  Degenerative disc disease, lumbar  Heart failure ([facility] [address]  High cholesterol  Hypertension  Paroxysmal atrial fibrillation ([facility] [address] 2019  Spinal stenosis Past Surgical History:  
Procedure Laterality Date  HX APPENDECTOMY  HX CORONARY ARTERY BYPASS GRAFT    
 triple  HX HERNIA REPAIR    
 HX IMPLANTABLE CARDIOVERTER DEFIBRILLATOR  LA CARDIOVERSION ELECTIVE ARRHYTHMIA EXTERNAL N/A 6/10/2019 EP CARDIOVERSION performed by Franklyn Lynch MD at [place] [address], [facility] Dr CATH LAB  LA CARDIOVERSION ELECTIVE ARRHYTHMIA EXTERNAL N/A 2019 EP CARDIOVERSION performed by Chirag Duvall MD at [place] [address], [facility] [address] CATH LAB  LA INSJ/RPLCMT PERM DFB W/TRNSVNS LDS 1/DUAL CHMBR N/A 2019 INSERT ICD BIV MULTI performed by Ruth Emmanuel MD at [place] [address], [facility] [address] CATH LAB  LA TCAT IMPL WRLS P-ART PRS SNR L-T HEMODYN MNTR N/A 2019 IMPLANT HEART FAILURE MONITORING DEVICE performed by Mounika Bernard MD at [place] [address], [facility]  CATH LAB Social History Socioeconomic History  Marital status:  Spouse name: Not on file  Number of children: Not on file  Years of education: Not on file  Highest education level: Not on file Occupational History  Not on file Social Needs  Financial resource strain: Not on file  Food insecurity:  
  Worry: Not on file Inability: Not on file  Transportation needs:  
  Medical: Not on file Non-medical: Not on file Tobacco Use  Smoking status: Former Smoker Last attempt to quit: 2010 Years since quittin.9  Smokeless tobacco: Never Used Substance and Sexual Activity  Alcohol use: Not Currently Comment: rarely  Drug use: Never  Sexual activity: Not on file Lifestyle  Physical activity:  
  Days per week: Not on file Minutes per session: Not on file  Stress: Not on file Relationships  Social connections:  
  Talks on phone: Not on file Gets together: Not on file Attends Christian service: Not on file Active member of club or organization: Not on file Attends meetings of clubs or organizations: Not on file Relationship status: Not on file  Intimate partner violence:  
  Fear of current or ex partner: Not on file Emotionally abused: Not on file Physically abused: Not on file Forced sexual activity: Not on file Other Topics Concern [address] Service Not Asked  Blood Transfusions Not Asked  Caffeine Concern Not Asked  Occupational Exposure Not Asked [employer] Hazards Not Asked  Sleep Concern Not Asked  Stress Concern Not Asked  Weight Concern Not Asked  Special Diet Not Asked  Back Care Not Asked  Exercise Not Asked  Bike Helmet Not Asked  Seat Belt Not Asked  Self-Exams Not Asked Social History Narrative  Not on file Family History Problem Relation Age of Onset  Lung Disease Mother  Hypertension Mother [facility] Arthritis-osteo Mother  Heart Disease Mother  Heart Disease Father  Diabetes Father Current Medications:  
Current Facility-Administered Medications Medication Dose Route Frequency Provider Last Rate Last Dose  heparin (porcine) 1,000 unit/mL injection 10,000 Units  10,000 Units IntraVENous RAD ONCE Mounika Altman MD      
 DOBUTamine (DOBUTREX) 500 mg/250 mL (2,000 mcg/mL) infusion  5 mcg/kg/min IntraVENous TITRATE Shana Sims NP 12.5 mL/hr at 10/30/19 0941 5 mcg/kg/min at 10/30/19 0941  famotidine (PEPCID) tablet 20 mg  20 mg Oral Q24H Sol Sukumar DIOR NP   20 mg at 10/30/19 1030  
 dextrose 5% infusion  4-20 mL/hr IntraVENous CONTINUOUS Maikel Parker Alveta Dural, NP 18.3 mL/hr at 10/30/19 0713 18.3 mL/hr at 10/30/19 8131  bivalirudin (ANGIOMAX) 250 mg in dextrose 5% 250 mL infusion  4-20 mL/hr Other TITRATE Em Macias NP   Stopped at 10/29/19 1100  
 alteplase (CATHFLO) 1 mg in sterile water (preservative free) 1 mL injection  1 mg InterCATHeter PRN Em Macias NP      
 bacitracin 500 unit/gram packet 1 Packet  1 Packet Topical PRN Em Macias NP      
 sodium chloride (NS) flush 5-40 mL  5-40 mL IntraVENous Q8H Danne Market D, NP   10 mL at 10/30/19 5688  sodium chloride (NS) flush 5-40 mL  5-40 mL IntraVENous PRN Em Macias NP      
 0.45% sodium chloride infusion  10 mL/hr IntraVENous CONTINUOUS Danne Market D, NP 10 mL/hr at 10/29/19 1321 10 mL/hr at 10/29/19 1321  
 0.9% sodium chloride infusion  100 mL/hr IntraVENous CONTINUOUS Levi, Shana B, NP 9 mL/hr at 10/29/19 1320 9 mL/hr at 10/29/19 1320  acetaminophen (TYLENOL) tablet 650 mg  650 mg Oral Q4H Danne Market D, NP   650 mg at 10/30/19 1030  
 oxyCODONE IR (ROXICODONE) tablet 5 mg  5 mg Oral Q4H PRN Em Macias NP      
 oxyCODONE IR (ROXICODONE) tablet 10 mg  10 mg Oral Q4H PRN Em Macias NP      
 morphine injection 4 mg  4 mg IntraVENous Q2H PRN Em Macias NP      
 naloxone [facility]) injection 0.4 mg  0.4 mg IntraVENous PRN Em Macias NP      
 mupirocin (BACTROBAN) 2 % ointment   Both Nostrils BID Em Macias NP      
 ondansetron [facility]) injection 4 mg  4 mg IntraVENous Q4H PRN Em Macias NP      
 albuterol (PROVENTIL VENTOLIN) nebulizer solution 2.5 mg  2.5 mg Nebulization Q4H PRN Em Macias NP      
 midazolam (VERSED) injection 1 mg  1 mg IntraVENous Q1H PRN Em Macias NP      
 chlorhexidine (PERIDEX) 0.12 % mouthwash 10 mL  10 mL Oral Q12H Glenda DIOR NP   10 mL at 10/30/19 0900  magnesium oxide (MAG-OX) tablet 400 mg  400 mg Oral BID Osvaldo Oropeza NP      
 calcium chloride 1 g in 0.9% sodium chloride 250 mL IVPB  1 g IntraVENous PRN Osvaldo Oropeza NP      
 bisacodyl (DULCOLAX) suppository 10 mg  10 mg Rectal DAILY PRN Osvaldo Oropeza NP      
 senna-docusate (PERICOLACE) 8.6-50 mg per tablet 1 Tab  1 Tab Oral BID Osvaldo Oropeza NP   1 Tab at 10/30/19 1030  polyethylene glycol (MIRALAX) packet 17 g  17 g Oral DAILY Sabra DIOR NP   17 g at 10/30/19 1030  ELECTROLYTE REPLACEMENT NOTE: Nurse to review Serum Potassium and Magnesuim levels and Initiate Electrolyte Replacement Protocol as needed  1 Each Other PRN Osvaldo Oropeza NP      
 magnesium sulfate 1 g/100 ml IVPB (premix or compounded)  1 g IntraVENous PRN Osvaldo Oropeza NP      
 glucose chewable tablet 16 g  4 Tab Oral PRN Osvaldo Oropeza NP      
 glucagon [facility]) injection 1 mg  1 mg IntraMUSCular PRN Osvaldo Oropeza NP      
 dextrose 10% infusion 0-250 mL  0-250 mL IntraVENous PRN Osvaldo Oropeza NP      
 morphine injection 2 mg  2 mg IntraVENous Q2H PRN Osvaldo Oropeza NP      
 melatonin tablet 3 mg  3 mg Oral QHS PRN Osvaldo Oropeza NP      
 propofol (DIPRIVAN) infusion  0-50 mcg/kg/min IntraVENous TITRATE Nanda Rodriguez MD   Stopped at 10/30/19 3827  ceFAZolin (ANCEF) 2 g/20 mL in sterile water IV syringe  2 g IntraVENous Q8H Sabra DIOR NP   2 g at 10/30/19 1048  dexmedeTOMidine (PRECEDEX) 400 mcg in 0.9% sodium chloride 100 mL infusion  0.2-1.2 mcg/kg/hr IntraVENous TITRATE Yasmine Gibbons MD   Stopped at 10/30/19 1000  
 albumin human 5% (BUMINATE) solution 25 g  25 g IntraVENous Q2H PRN Yasmine Gibbons MD   25 g at 10/30/19 3860  insulin lispro (HUMALOG) injection   SubCUTAneous Q6H Claire Andrews MD   Stopped at 10/30/19 0000  [Held by provider] spironolactone (ALDACTONE) tablet 25 mg  25 mg Oral DAILY Tolu Camacho NP   Stopped at 10/29/19 0900  
 milrinone (PRIMACOR) 20 MG/100 ML D5W infusion  0.4 mcg/kg/min IntraVENous CONTINUOUS Shana Sims NP 10.2 mL/hr at 10/30/19 0945 0.4 mcg/kg/min at 10/30/19 0945  
 amiodarone (CORDARONE) tablet 100 mg  100 mg Oral BID Mayuri DIOR NP   100 mg at 10/30/19 1030  
 influenza vaccine 2019-20 (6 mos+)(PF) (FLUARIX/FLULAVAL/FLUZONE QUAD) injection 0.5 mL  0.5 mL IntraMUSCular PRIOR TO DISCHARGE Tolu Camacho NP      
 aspirin delayed-release tablet 81 mg  81 mg Oral DAILY Mayuri DIOR NP   81 mg at 10/30/19 1030  bumetanide (BUMEX) 0.25 mg/mL infusion  0.5 mg/hr IntraVENous CONTINUOUS Tolu Camacho NP   Stopped at 10/29/19 1620  tamsulosin (FLOMAX) capsule 0.8 mg  0.8 mg Oral DAILY Mayuri DIOR NP   0.8 mg at 10/30/19 1030  
 allopurinol (ZYLOPRIM) tablet 100 mg  100 mg Oral DAILY Mayuri DIOR, NP   100 mg at 10/30/19 1030 Allergies: No Known Allergies Recent Labs:  
Labs Latest Ref Rng & Units 10/30/2019 10/29/2019 10/28/2019 10/28/2019 10/27/2019 10/26/2019 10/26/2019 WBC 4.1 - 11.1 K/uL 4. 0(L) 4.4 - 5.0 4.1 4.6 -  
RBC 4.10 - 5.70 M/uL 3.62(L) 3.42(L) - 3.48(L) 3.48(L) 3.39(L) - Hemoglobin 12.1 - 17.0 g/dL 10. 6(L) 9.8(L) - 10. 0(L) 10. 0(L) 9.7(L) - Hematocrit 36.6 - 50.3 % 33. 0(L) 31. 3(L) - 31. 9(L) 31. 7(L) 30. 6(L) -  
MCV 80.0 - 99.0 FL 91.2 91.5 - 91.7 91.1 90.3 - Platelets 973 - 715 K/uL 140(L) 152 - 88(L) 106(L) 167 - Lymphocytes 12 - 49 % - - - - - 9(L) - Monocytes 5 - 13 % - - - - - 12 - Eosinophils 0 - 7 % - - - - - 1 - Basophils 0 - 1 % - - - - - 0 - Albumin 3.5 - 5.0 g/dL 3.2(L) 2. 4(L) - 2. 4(L) 2. 4(L) - -  
Calcium 8.5 - 10.1 MG/DL 9.4 8.9 - 8.8 7. 9(L) - -  
SGOT 15 - 37 U/L 52(H) 48(H) - 49(H) 58(H) - -  
Glucose 65 - 100 mg/dL 108(H) 96 - 110(H) 102(H) - -  
BUN 6 - 20 MG/DL 28(H) 24(H) - 20 23(H) - -  
 Creatinine 0.70 - 1.30 MG/DL 1.88(H) 1.59(H) - 1.59(H) 1.61(H) - - Sodium 136 - 145 mmol/L 132(L) 130(L) - 132(L) 132(L) - - Potassium 3.5 - 5.1 mmol/L 3.2(L) 4.1 4.8 4.7 3. 2(L) - 3.7 TSH 0.36 - 3.74 uIU/mL - - - - - - -  
PSA 0.01 - 4.0 ng/mL - - - - - - -  
LDH 85 - 241 U/L 352(H) - - - - - - Some recent data might be hidden Lab Results Component Value Date/Time WBC 4.0 (L) 10/30/2019 04:48 AM  
 HGB 10.6 (L) 10/30/2019 04:48 AM  
 HCT 33.0 (L) 10/30/2019 04:48 AM  
 PLATELET 212 (L) 93/36/5259 04:48 AM  
 MCV 91.2 10/30/2019 04:48 AM  
 
Lab Results Component Value Date/Time GFR est non-AA 36 (L) 10/30/2019 04:48 AM  
 GFR est AA 43 (L) 10/30/2019 04:48 AM  
 Creatinine 1.88 (H) 10/30/2019 04:48 AM  
 BUN 28 (H) 10/30/2019 04:48 AM  
 Sodium 132 (L) 10/30/2019 04:48 AM  
 Potassium 3.2 (L) 10/30/2019 04:48 AM  
 Chloride 91 (L) 10/30/2019 04:48 AM  
 CO2 33 (H) 10/30/2019 04:48 AM  
 Magnesium 2.4 10/30/2019 04:48 AM  
 Phosphorus 2.4 (L) 06/04/2019 04:09 AM  
 
Lab Results Component Value Date/Time TSH 2.40 10/25/2019 07:39 PM  
 T4, Free 1.7 (H) 10/25/2019 07:39 PM  
  
Lab Results Component Value Date/Time Sodium 132 (L) 10/30/2019 04:48 AM  
 Potassium 3.2 (L) 10/30/2019 04:48 AM  
 Chloride 91 (L) 10/30/2019 04:48 AM  
 CO2 33 (H) 10/30/2019 04:48 AM  
 Anion gap 8 10/30/2019 04:48 AM  
 Glucose 108 (H) 10/30/2019 04:48 AM  
 BUN 28 (H) 10/30/2019 04:48 AM  
 Creatinine 1.88 (H) 10/30/2019 04:48 AM  
 BUN/Creatinine ratio 15 10/30/2019 04:48 AM  
 GFR est AA 43 (L) 10/30/2019 04:48 AM  
 GFR est non-AA 36 (L) 10/30/2019 04:48 AM  
 Calcium 9.4 10/30/2019 04:48 AM  
 Bilirubin, total 2.2 (H) 10/30/2019 04:48 AM  
 ALT (SGPT) 41 10/30/2019 04:48 AM  
 AST (SGOT) 52 (H) 10/30/2019 04:48 AM  
 Alk.  phosphatase 126 (H) 10/30/2019 04:48 AM  
 Protein, total 6.9 10/30/2019 04:48 AM  
 Albumin 3.2 (L) 10/30/2019 04:48 AM  
 Globulin 3.7 10/30/2019 04:48 AM  
 A-G Ratio 0.9 (L) 10/30/2019 04:48 AM  
  
Lab Results Component Value Date/Time Hemoglobin A1c 6.6 (H) 10/25/2019 02:56 PM  
  
 
 
Thank you for letting us see him with you, TIERRA Benson [address], [address] 1400 [facility], [address] [place] [address] Office [phone] Fax [phone] [facility] ATTENDING ADDENDUM Patient was seen and examined in person. Data and notes were reviewed. I have discussed and agree with the plan as noted in the NP note above without further additions. Natasha White MD PhD 
Calos Rueda [address] [facility] Total Critical Care time spent: [id number] 
30 minutes. There was no overlap with other services Critical care was necessary to treat or prevent imminent or life threatening deterioration of the following conditions: cardiac failure, respiratory failure and CNS failure or compromise 
  
Services Provided: 1. Telemetry review and 12 lead ECG interpretation 2. Hemodynamic interpretation, assessment, and management 3. Review and interpretation of CXR 4. Review and interpretation of lab values 5. Review and interpretation of microbiologic data and culture results 6. Review of medications and administration 7. Review and interpretation of nutrition requirements and management 8. Discussion of management withother consultants and services 9.  Clinical update to family members

## 2019-10-30 NOTE — OP NOTES
[address]  
OPERATIVE REPORT Name:  Messi Multani 
MR#:  [id number] :  1950 ACCOUNT #:  [de-identified] DATE OF SERVICE:  10/29/2019 PREOPERATIVE DIAGNOSES: 
1. Cardiogenic shock. 2.  New York Heart Association Class IV acute on chronic systolic heart failure. Adriano Du POSTOPERATIVE DIAGNOSES: 
1. Cardiogenic shock. 2.  New York Heart Association Class IV acute on chronic systolic heart failure. PROCEDURES PERFORMED: 
1. Right axillary artery exploration with construction of a 10 mm chimney graft used for introduction of a percutaneous left ventricular assist device (Impella 5.0; CPT code 68641). 2.  Insertion of percutaneous left ventricular assist device (Impella 5.0) through right axillary artery chimney graft (CPT code 34764). SURGEON:  Madhuri Campbell MD 
 
ASSISTANT:  MALCOLM Arreola 
; WILMA assistance was needed due to difficulty of procedure, dissection, and identification of pertinent anatomy throughout the case ANESTHESIA:  General endotracheal anesthesia. COMPLICATIONS:  None. SPECIMENS REMOVED:  None. IMPLANTS:  None. ESTIMATED BLOOD LOSS:  10 mL. PROCEDURE:  The patient is a very pleasant 60-year-old gentleman who was recently diagnosed with cardiogenic shock. The patient has progressively decrease in his blood pressures despite inotropes and pressors. He is now being brought to the operating room to have an Impella device placed. The patient was brought to the operating room, underwent general endotracheal anesthesia without complications. Next, the patient had his chest prepped and draped in the usual sterile fashion. A right infraclavicular incision was made. Cautery dissection was used to dissect down to the level of the right axillary artery. An appropriate dose of heparin was made.   We placed a clamp, opened the artery with a 75 blade and further extended the arteriotomy with forward and reverse Wallace. We then performed a 10-mm Hemashield graft to right axillary artery anastomosis using a 5-0 Prolene suture. We then glued it, released the clamps, accessed the left ventricular cavity using an AL2 catheter and J-wire, exchanged it over to the Impella wire, then brought it in, removed the wire, started the Impella, and there was good flow. Vicryl suture was used to close the incision. I was present for the entire procedure. MD ROMÁN [id number] 
D:  10/30/2019 10:45 
T:  10/30/2019 14:32 
JOB #:  [id number]

## 2019-10-30 NOTE — PROGRESS NOTES
Problem: Self Care Deficits Care Plan (Adult) Goal: *Acute Goals and Plan of Care (Insert Text) Description FUNCTIONAL STATUS PRIOR TO ADMISSION: Patient was modified independent using a single point cane for functional mobility. Patient able to shower and dress himself. However, patient required assistance for household management from his wife. HOME SUPPORT: The patient lived alone with wife to provide assistance. Occupational Therapy Goals: 
Continue all goals 10/30/19 post re-eval for Impella removal  
Initiated 10/28/2019 1. Patient will perform bathing with supervision/set-up within 7 day(s). 2.  Patient will perform lower body dressing with supervision/set-up within 7 day(s). 3.  Patient will perform grooming with modified independence within 7 day(s). 4.  Patient will perform toilet transfers with modified independence within 7 day(s). 5.  Patient will perform all aspects of toileting with supervision/set-up within 7 day(s). 6.  Patient will participate in upper extremity therapeutic exercise/activities with supervision/set-up for 5 minutes within 7 day(s). 7.  Patient will utilize energy conservation techniques during functional activities with verbal cues within 7 day(s). Outcome: Progressing Towards Goal 
 
OCCUPATIONAL THERAPY RE-EVALUATION Patient: Reid Baumann (75 y.o. male) Date: 10/30/2019 Diagnosis: Acute decompensated heart failure ([facility] [place] [address] [I50.9] <principal problem not specified> Procedure(s) (LRB): 
RIGHT AXILLARY IMPELLA INSERTION (Right) 1 Day Post-Op Precautions: Fall(x 3 in 1-2 weeks) Chart, occupational therapy assessment, plan of care, and goals were reviewed. ASSESSMENT Based on the objective data described below, pt is progressing and remains motivated to work with therapy. Pt seen post extubation this AM and Impella placement POD1.  Mobility/ADLs limited by lines/leads but pt agreeable for placement into modified bed >chair position. Intact UE/LE strength noted with pt participating in forward flexion x 10 and trunk twists x 5 to each side to assist with mobility of lumbar, thoracic and cervical spine. Pt continues to be an LVAD workup, supportive family at bedside. Current Level of Function Impacting Discharge (ADLs): setup to mod A for ADLs Other factors to consider for discharge: independent but LVAD workup PLAN : 
Recommendations and Planned Interventions: self care training, functional mobility training, therapeutic exercise, balance training, endurance activities, patient education, home safety training and family training/education Frequency/Duration: Patient will be followed by occupational therapy 5 times a week to address goals. Recommend with staff: Suki Villa to chair Recommend next OT session: OOB, standing tolerance Recommendation for discharge: (in order for the patient to meet his/her long term goals) To be determined: LVAD workup This discharge recommendation: 
Has been made in collaboration with the attending provider and/or case management Equipment recommendations for successful discharge (if) home: tbd SUBJECTIVE:  
Patient stated sure, we can do that.  OBJECTIVE DATA SUMMARY:  
Hospital course since last seen and reason for reevaluation: underwent Impella 10/29 with respiratory failure post op, requiring intubation. Pt extubated this AM. Cognitive/Behavioral Status: 
Neurologic State: Alert; Appropriate for age Orientation Level: Oriented X4 Cognition: Follows commands; Appropriate decision making; Appropriate for age attention/concentration; Appropriate safety awareness Functional Mobility and Transfers for ADLs: 
Bed Mobility: 
 Intact, bed to chair position Transfers: 
 Not tested this date Balance: 
Sitting: Intact Standing: Intact; With support Standing - Static: Good Standing - Dynamic : Good(with forward flexion in bed, lateral twists) ADL Assessment: 
Feeding: Independent Oral Facial Hygiene/Grooming: Setup(in bed 2* limitations with lines/leads) Bathing: Moderate assistance(bed level, lines/leads limitations) Upper Body Dressing: Minimum assistance(lines/leads) Lower Body Dressing: Moderate assistance(rolling in bed to pull up) Toileting: Total assistance ADL Intervention and task modifications: 
  
 
Grooming Washing Face: Set-up(in bed>chair position) Therapeutic Exercises:  
Completed 10 reps of long arc quads with 3 second hold against gravity Functional Measure: 
Barthel Index: 
 
Bathin Bladder: 0 Bowels: 10 
Groomin Dressin Feeding: 10 Mobility: 0 Stairs: 0 Toilet Use: 0 Transfer (Bed to Chair and Back): 0 Total: 25/100 The Barthel ADL Index: Guidelines 1. The index should be used as a record of what a patient does, not as a record of what a patient could do. 2. The main aim is to establish degree of independence from any help, physical or verbal, however minor and for whatever reason. 3. The need for supervision renders the patient not independent. 4. A patient's performance should be established using the best available evidence. Asking the patient, friends/relatives and nurses are the usual sources, but direct observation and common sense are also important. However direct testing is not needed. 5. Usually the patient's performance over the preceding 24-48 hours is important, but occasionally longer periods will be relevant. 6. Middle categories imply that the patient supplies over 50 per cent of the effort. 7. Use of aids to be independent is allowed. Tasneem Morris., Barthel, D.W. (0371). Functional evaluation: the Barthel Index. [facility] (14)2.  
Farooq Chou, CHRISSY.F, Katarina Meadows., Heri Frost., Christopher Cali. (1999). Measuring the change indisability after inpatient rehabilitation; comparison of the responsiveness of the Barthel Index and Functional Meigs Measure. Journal of Neurology, Neurosurgery, and Psychiatry, 66(4), 244-286. RIVAS Ferrari, SEVERO Lindsay, & Darby Norman M.A. (2004.) Assessment of post-stroke quality of life in cost-effectiveness studies: The usefulness of the Barthel Index and the EuroQoL-5D. [facility], 13, [phone] Pain: 
No c/o Activity Tolerance:  
Good Please refer to the flowsheet for vital signs taken during this treatment. After treatment patient left in no apparent distress:  
Supine in bed in modified chair position and Call bell within reach COMMUNICATION/COLLABORATION:  
The patients plan of care was discussed with: Registered Nurse and Rehabilitation Attendant Soraya Ritter OT Time Calculation: 9 mins

## 2019-10-30 NOTE — PROGRESS NOTES
Physical Therapy 10/30/2019 Chart reviewed. Patient extubated this AM following Impella insertion. However, patient now being prepared for heart catheterization at 1415. Acute PT services to follow up tomorrow. Thank you.  
Kelvin Jacobo, PT, DPT

## 2019-10-30 NOTE — PROGRESS NOTES
[facility] 
 [address] [address], [address] [facility] Phone: [phone]   [phone]   
  
Nephrology Progress Note Sona Kiser     1950     [id number] Date of Admission : 10/25/2019 
10/30/19 CC:  Follow up for JUAN J, CKD, Hyponatremia Assessment and Plan JUAN J/CKD Stage IV : 
- likely from CRS 
- Cr up, CVP down this AM 
- hold diuretics 
- albumin x 2 doses this AM 
- daily labs CKD IV: 
- L renal atrophy 
- NM scan noted Hypokalemia: 
- replete K Hyponatremia: 
- from hypervolemia and excessive fluid intake 
- stable Hoarseness, vocal cord paralysis, mediastinal adenopathy\" - will need eventual PET/CT 
- per pulmonary 
  
BPH w/ urinary retention  
- keep neal for now 
  
Acute on Chronic HFrEF  
- EF 16-20%, NYHA Class IV , hx of VF arrest - s/p AICD 
- Impella insertion 10/29 
  
JOSE on CPAP  
  
PAF s/p DCCV 6/19 
- on Eliquis and amiodarone  
  
Chronic Anemia: 
- from CKD and iron deficiency - s/p IV iron Interval History:   
Seen and examined. Intubated and sedated now. BP stable. CVP 6, UOP 5.7 L, off bumex drip and still making about 200cc/hr per RN overnight. Cr up slightly this AM. Review of Systems: Pertinent items are noted in HPI. Current Medications:  
Current Facility-Administered Medications Medication Dose Route Frequency  potassium chloride 20 mEq in 50 ml IVPB  20 mEq IntraVENous Q1H  
 dextrose 5% infusion  4-20 mL/hr IntraVENous CONTINUOUS  
 bivalirudin (ANGIOMAX) 250 mg in dextrose 5% 250 mL infusion  4-20 mL/hr Other TITRATE  alteplase (CATHFLO) 1 mg in sterile water (preservative free) 1 mL injection  1 mg InterCATHeter PRN  
 bacitracin 500 unit/gram packet 1 Packet  1 Packet Topical PRN  
 sodium chloride (NS) flush 5-40 mL  5-40 mL IntraVENous Q8H  
 sodium chloride (NS) flush 5-40 mL  5-40 mL IntraVENous PRN  
 0.45% sodium chloride infusion  10 mL/hr IntraVENous CONTINUOUS  
  0.9% sodium chloride infusion  9 mL/hr IntraVENous CONTINUOUS  
 acetaminophen (TYLENOL) tablet 650 mg  650 mg Oral Q4H  
 oxyCODONE IR (ROXICODONE) tablet 5 mg  5 mg Oral Q4H PRN  
 oxyCODONE IR (ROXICODONE) tablet 10 mg  10 mg Oral Q4H PRN  
 morphine injection 4 mg  4 mg IntraVENous Q2H PRN  
 naloxone (NARCAN) injection 0.4 mg  0.4 mg IntraVENous PRN  
 mupirocin (BACTROBAN) 2 % ointment   Both Nostrils BID  ondansetron (ZOFRAN) injection 4 mg  4 mg IntraVENous Q4H PRN  
 albuterol (PROVENTIL VENTOLIN) nebulizer solution 2.5 mg  2.5 mg Nebulization Q4H PRN  
 midazolam (VERSED) injection 1 mg  1 mg IntraVENous Q1H PRN  chlorhexidine (PERIDEX) 0.12 % mouthwash 10 mL  10 mL Oral Q12H  
 famotidine (PEPCID) tablet 20 mg  20 mg Oral Q12H  
 magnesium oxide (MAG-OX) tablet 400 mg  400 mg Oral BID  calcium chloride 1 g in 0.9% sodium chloride 250 mL IVPB  1 g IntraVENous PRN  
 bisacodyl (DULCOLAX) suppository 10 mg  10 mg Rectal DAILY PRN  
 senna-docusate (PERICOLACE) 8.6-50 mg per tablet 1 Tab  1 Tab Oral BID  polyethylene glycol (MIRALAX) packet 17 g  17 g Oral DAILY  ELECTROLYTE REPLACEMENT NOTE: Nurse to review Serum Potassium and Magnesuim levels and Initiate Electrolyte Replacement Protocol as needed  1 Each Other PRN  
 magnesium sulfate 1 g/100 ml IVPB (premix or compounded)  1 g IntraVENous PRN  
 glucose chewable tablet 16 g  4 Tab Oral PRN  
 glucagon (GLUCAGEN) injection 1 mg  1 mg IntraMUSCular PRN  
 dextrose 10% infusion 0-250 mL  0-250 mL IntraVENous PRN  
 morphine injection 2 mg  2 mg IntraVENous Q2H PRN  potassium chloride 20 mEq in 50 ml IVPB  20 mEq IntraVENous Q2H PRN  potassium chloride 20 mEq in 50 ml IVPB  20 mEq IntraVENous Q2H PRN  
 melatonin tablet 3 mg  3 mg Oral QHS PRN  propofol (DIPRIVAN) infusion  0-50 mcg/kg/min IntraVENous TITRATE  ceFAZolin (ANCEF) 2 g/20 mL in sterile water IV syringe  2 g IntraVENous Q8H  
  dexmedeTOMidine (PRECEDEX) 400 mcg in 0.9% sodium chloride 100 mL infusion  0.2-1.2 mcg/kg/hr IntraVENous TITRATE  albumin human 5% (BUMINATE) solution 25 g  25 g IntraVENous Q2H PRN  
 DOBUTamine (DOBUTREX) 500 mg/250 mL (2,000 mcg/mL) infusion  0-10 mcg/kg/min IntraVENous TITRATE  insulin lispro (HUMALOG) injection   SubCUTAneous Q6H  
 spironolactone (ALDACTONE) tablet 25 mg  25 mg Oral DAILY  milrinone (PRIMACOR) 20 MG/100 ML D5W infusion  0.4 mcg/kg/min IntraVENous CONTINUOUS  
 amiodarone (CORDARONE) tablet 100 mg  100 mg Oral BID  influenza vaccine 2019-20 (6 mos+)(PF) (FLUARIX/FLULAVAL/FLUZONE QUAD) injection 0.5 mL  0.5 mL IntraMUSCular PRIOR TO DISCHARGE  aspirin delayed-release tablet 81 mg  81 mg Oral DAILY  bumetanide (BUMEX) 0.25 mg/mL infusion  0.5 mg/hr IntraVENous CONTINUOUS  
 tamsulosin (FLOMAX) capsule 0.8 mg  0.8 mg Oral DAILY  allopurinol (ZYLOPRIM) tablet 100 mg  100 mg Oral DAILY No Known Allergies Objective: 
Vitals:   
Vitals:  
 10/30/19 0530 10/30/19 0600 10/30/19 0630 10/30/19 0700 BP:      
Pulse: 88 86 85 86 Resp: 18 20 20 20 Temp:      
SpO2: 98% 98% 98% 98% Weight:      
Height:      
 
Intake and Output: 
No intake/output data recorded. 10/28 1901 - 10/30 0700 In: 3501.6 [P.O.:240; I.V.:3047.8] Out: Christian Islas [[id number]] Physical Examination: 
Pt intubated     Yes General: sedated Neck:  Supple, no mass Resp:  Lungs few dependent rales CV:  RRR,  no murmur or rub, (+) LE edema GI:  Soft, NT, + Bowel sounds Neurologic:  Sedated on the vent Psych:             Unable to assess Skin:  No Rash :  neal [x]    High complexity decision making was performed 
[]    Patient is at high-risk of decompensation with multiple organ involvement Lab Data Personally Reviewed: I have reviewed all the pertinent labs, microbiology data and radiology studies during assessment. Recent Labs 10/30/19 
0443 10/29/19 
0332 10/28/19 [phone] 10/28/19 
0357 * 130*  --  132* K 3.2* 4.1 4.8 4.7 CL 91* 93*  --  97  
CO2 33* 31  --  32 * 96  --  110* BUN 28* 24*  --  20  
CREA 1.88* 1.59*  --  1.59* CA 9.4 8.9  --  8.8 MG 2.4 2.2  --  2.2 ALB 3.2* 2.4*  --  2.4* SGOT 52* 48*  --  49* ALT 41 94*  --  107* INR 1.3* 1.3*  --   --   
 
Recent Labs 10/30/19 
0448 10/29/19 
6626 10/28/19 
2984 WBC 4.0* 4.4 5.0 HGB 10.6* 9.8* 10.0* HCT 33.0* 31.3* 31.9*  
* 152 88* No results found for: SDES Lab Results Component Value Date/Time Culture result: NO GROWTH 4 DAYS 10/25/2019 07:14 PM  
 Culture result: ENTEROBACTER CLOACAE (A) 06/26/2019 12:25 PM  
 Culture result: NO GROWTH 1 DAY 06/13/2019 10:44 AM  
 Culture result: ENTEROBACTER CLOACAE (A) 06/04/2019 04:27 AM  
 Culture result: NO GROWTH 5 DAYS 06/04/2019 04:09 AM  
 
Recent Results (from the past 24 hour(s)) TYPE + CROSSMATCH Collection Time: 10/29/19  7:50 AM  
Result Value Ref Range Crossmatch Expiration 11/01/2019 ABO/Rh(D) O POSITIVE Antibody screen POS Antibody ID    
  NONSPECIFIC ANTIBODY 
NON-SPECIFIC COLD ANTIBODY DETECTED Unit number [id number] Blood component type [facility] Unit division 00 Status of unit ALLOCATED Crossmatch result Compatible Unit number [id number] Blood component type [facility] Unit division 00 Status of unit REL FROM [facility] Crossmatch result Incompatible POC G3 - PUL Collection Time: 10/29/19 11:23 AM  
Result Value Ref Range FIO2 (POC) 1.0 %  
 pH (POC) 7.149 (LL) 7.35 - 7.45    
 pCO2 (POC) >90.0 (H) 35.0 - 45.0 MMHG  
 pO2 (POC) 94 80 - 100 MMHG Site DRAWN FROM ARTERIAL LINE Device: AMBU Flow rate (POC) 15 L/M Allens test (POC) N/A Specimen type (POC) ARTERIAL    
POC G3 - PUL Collection Time: 10/29/19 11:30 AM  
Result Value Ref Range  FIO2 (POC) 1.0 %  
 pH (POC) 7.355 7.35 - 7.45    
 pCO2 (POC) 61.2 (H) 35.0 - 45.0 MMHG  
 pO2 (POC) 197 (H) 80 - 100 MMHG  
 HCO3 (POC) 34.2 (H) 22 - 26 MMOL/L  
 sO2 (POC) 100 (H) 92 - 97 % Base excess (POC) 9 mmol/L Site DRAWN FROM ARTERIAL LINE Device: VENT Mode ASSIST CONTROL Tidal volume 450 ml Set Rate 18 bpm  
 PEEP/CPAP (POC) 8 cmH2O Allens test (POC) N/A Specimen type (POC) ARTERIAL Total resp. rate 26 GLUCOSE, POC Collection Time: 10/29/19 11:54 AM  
Result Value Ref Range Glucose (POC) 182 (H) 65 - 100 mg/dL Performed by JED FELTON   
POC VENOUS BLOOD GAS Collection Time: 10/29/19  1:02 PM  
Result Value Ref Range Device: VENT    
 FIO2 (POC) 0.70 %  
 pH, venous (POC) 7.321 7.32 - 7.42    
 pCO2, venous (POC) 57.0 (H) 41 - 51 MMHG  
 pO2, venous (POC) 42 (H) 25 - 40 mmHg HCO3, venous (POC) 29.4 (H) 23.0 - 28.0 MMOL/L  
 sO2, venous (POC) 73 65 - 88 % Base excess, venous (POC) 3 mmol/L Allens test (POC) N/A Site CHITO Montoya Specimen type (POC) MIXED VENOUS    
GLUCOSE, POC Collection Time: 10/29/19  4:40 PM  
Result Value Ref Range Glucose (POC) 102 (H) 65 - 100 mg/dL Performed by JED FELTON   
GLUCOSE, POC Collection Time: 10/29/19 11:36 PM  
Result Value Ref Range Glucose (POC) 104 (H) 65 - 100 mg/dL Performed by Bhumika Wild   
POC VENOUS BLOOD GAS Collection Time: 10/30/19  4:39 AM  
Result Value Ref Range Device: VENT    
 FIO2 (POC) 40 %  
 pH, venous (POC) 7.464 (H) 7.32 - 7.42    
 pCO2, venous (POC) 42.5 41 - 51 MMHG  
 pO2, venous (POC) 38 25 - 40 mmHg HCO3, venous (POC) 30.5 (H) 23.0 - 28.0 MMOL/L  
 sO2, venous (POC) 75 65 - 88 % Base excess, venous (POC) 7 mmol/L Mode ASSIST CONTROL Tidal volume 450 ml Set Rate 18 bpm  
 PEEP/CPAP (POC) 5 cmH2O Allens test (POC) N/A Total resp. rate 19 Site SWMANDI BlueBrijesh Specimen type (POC) MIXED VENOUS    
LD  Collection Time: 10/30/19  4:48 AM  
 Result Value Ref Range  (H) 85 - 241 U/L  
NT-PRO BNP Collection Time: 10/30/19  4:48 AM  
Result Value Ref Range NT pro-BNP 11,011 (H) <125 PG/ML  
LACTIC ACID Collection Time: 10/30/19  4:48 AM  
Result Value Ref Range Lactic acid 1.0 0.4 - 2.0 MMOL/L  
METABOLIC PANEL, COMPREHENSIVE Collection Time: 10/30/19  4:48 AM  
Result Value Ref Range Sodium 132 (L) 136 - 145 mmol/L Potassium 3.2 (L) 3.5 - 5.1 mmol/L Chloride 91 (L) 97 - 108 mmol/L  
 CO2 33 (H) 21 - 32 mmol/L Anion gap 8 5 - 15 mmol/L Glucose 108 (H) 65 - 100 mg/dL BUN 28 (H) 6 - 20 MG/DL Creatinine 1.88 (H) 0.70 - 1.30 MG/DL  
 BUN/Creatinine ratio 15 12 - 20 GFR est AA 43 (L) >60 ml/min/1.73m2 GFR est non-AA 36 (L) >60 ml/min/1.73m2 Calcium 9.4 8.5 - 10.1 MG/DL Bilirubin, total 2.2 (H) 0.2 - 1.0 MG/DL  
 ALT (SGPT) 41 12 - 78 U/L  
 AST (SGOT) 52 (H) 15 - 37 U/L Alk. phosphatase 126 (H) 45 - 117 U/L Protein, total 6.9 6.4 - 8.2 g/dL Albumin 3.2 (L) 3.5 - 5.0 g/dL Globulin 3.7 2.0 - 4.0 g/dL A-G Ratio 0.9 (L) 1.1 - 2.2 MAGNESIUM Collection Time: 10/30/19  4:48 AM  
Result Value Ref Range Magnesium 2.4 1.6 - 2.4 mg/dL CBC W/O DIFF Collection Time: 10/30/19  4:48 AM  
Result Value Ref Range WBC 4.0 (L) 4.1 - 11.1 K/uL  
 RBC 3.62 (L) 4.10 - 5.70 M/uL  
 HGB 10.6 (L) 12.1 - 17.0 g/dL HCT 33.0 (L) 36.6 - 50.3 % MCV 91.2 80.0 - 99.0 FL  
 MCH 29.3 26.0 - 34.0 PG  
 MCHC 32.1 30.0 - 36.5 g/dL  
 RDW 17.4 (H) 11.5 - 14.5 % PLATELET 890 (L) 095 - 400 K/uL MPV 9.9 8.9 - 12.9 FL  
 NRBC 0.0 0  WBC ABSOLUTE NRBC 0.00 0.00 - 0.01 K/uL PTT Collection Time: 10/30/19  4:48 AM  
Result Value Ref Range aPTT 31.3 22.1 - 32.0 sec  
 aPTT, therapeutic range     58.0 - 77.0 SECS  
PROTHROMBIN TIME + INR Collection Time: 10/30/19  4:48 AM  
Result Value Ref Range INR 1.3 (H) 0.9 - 1.1 Prothrombin time 12.8 (H) 9.0 - 11.1 sec GLUCOSE, POC  
 Collection Time: 10/30/19  5:08 AM  
Result Value Ref Range Glucose (POC) 112 (H) 65 - 100 mg/dL Performed by Ema Meehan Total time spent with patient:  xxx   min. Care Plan discussed with: 
Patient Family RN Consulting Physician [address] [facility],      
 
I have reviewed the flowsheets. Chart and Pertinent Notes have been reviewed. No change in PMH ,family and social history from Consult note.  
 
 
Alex Hodges MD

## 2019-10-30 NOTE — PROGRESS NOTES
Speech Pathology:  Chart reviewed and discussed with RN. Per chart/RN patient NPO for procedure this afternoon. SLP to continue to follow for dysphagia treatment/MBS as medically appropriate. Thank you.  
 
Jose Montemayor, SLP

## 2019-10-30 NOTE — PROGRESS NOTES
NYHA class IV A/C systolic heart failure Low EF (10%) Inotrope dependent Malnutrition LVAD Work-up Extubated this am  
 
Hgb and platelets look good Creatinine bumped a little Giving volume Bilirubin elevated Other LFTs look good Lactic acid and LDH reasonable Pro-calcitonin a little elevated NT pro-BNP about the same ABG looks good Starting bivalirudin through Impella Impella - flow 4 L/min @ P-8 CXR - improving pulmonary edema Intake/Output Summary (Last 24 hours) at 10/30/2019 1521 Last data filed at 10/30/2019 1000 Gross per 24 hour Intake 2723.46 ml Output 4475 ml Net -1751.54 ml Visit Vitals BP (!) 78/75 (BP Patient Position: At rest) Pulse 78 Temp 98.4 °F (36.9 °C) Resp 17 Ht 6' 2\" (1.88 m) Wt 175 lb 7.8 oz (79.6 kg) SpO2 95% BMI 22.53 kg/m² Risk of morbidity and mortality - high Medical decision making - high complexity Total critical care time - 30 minutes (CPT 47963)

## 2019-10-30 NOTE — PROGRESS NOTES
Patient Evaluation for Ventricular Assist Device/ Transplant Name: Vicente Gordillo MRN: [id number] YOB: 1950 Age: 71 y.o. Gender:  Male Evaluation for: LVAD as DT 
 
LVAD: (Y/N)     Date of Implant: TBD              Device: HM 3 Referring Physician: MD Joe Devine [facility] Cardiologist: Albin Pichardo MD 
PCP: Sarah Gibbs MD 
 
Intermacs Profile: 3 with Modifer TCS Blood Group:  
O POSITIVE Height:  
Ht Readings from Last 3 Encounters:  
10/29/19 6' 2\" (1.88 m) 09/30/19 6' 2\" (1.88 m) 09/18/19 6' 2\" (1.88 m) Weight:  
Wt Readings from Last 3 Encounters:  
10/30/19 79.6 kg (175 lb 7.8 oz) 10/25/19 87.7 kg (193 lb 6.4 oz) 09/30/19 90.7 kg (199 lb 14.4 oz) BMI: Body mass index is 22.53 kg/m². Vitals (last 3 visits): 
 
/55 (BP 1 Location: Left arm, BP Patient Position: At rest) Pulse 79 Temp 98.4 °F (36.9 °C) Resp 23 Ht 6' 2\" (1.88 m) Wt 177 lb 0.5 oz (80.3 kg) SpO2 96% BMI 22.73 kg/  
 
 
 
 
Heart Failure Etiology: ICM Time of GDMT: N/A - on Impella support HPI: Vicente Gordillo is a 71y.o. year old pleasant white male with a history of HTN, HLD, JOSE, CAD s/p cardiac arrest VFib s/p CABG (2011) c/b sternal would infection and sternectomy, ischemic cardiomyopathy LVEF 15-20%, s/p ICD and with LBBB. He was admitted with acute on chronic systolic heart failure with massive volume overload > 20 lbs, in the setting of atrial fibrillation s/p failed DCCV and underwent DCCV and RHC on 6/18. S/p BiVICD on 6/21/19 with Dr. Sesar Garibay. He was discharged home home on IV milrinone on 6/26/19. He has been followed closely by Dr. Gurdeep Ramos and the [facility]. Mr. Albin Corcoran returns to clinic for follow up with his wife. His dyspnea has progressed to the point he is sitting in a chair most of the day. He is unable to perform simple ADLs without limiting dyspnea.  His appetite is down. He underwent a sleep study and is using CPAP but has difficulty due to frequent nocturia. He denies presyncope or syncope. He has had a few falls due to generalized weakness but did not hit his head nor lose consciousness. Recent labs reveal stable creatinine from 1.9 - 2.1. He is currently taking torsemide 60 mg bid. His wife has noticed an odor to his urine. He denies fever/chills but admits to feeling cold all the time. 
  
 
Past Medical History:  
Past Medical History:  
Diagnosis Date  Degenerative disc disease, lumbar  Heart failure ([facility]  High cholesterol  Hypertension  Paroxysmal atrial fibrillation ([facility] 4/2/2019  Spinal stenosis Past Surgical History:  
Prior Sternotomy: Y       Prior Abd Surgery: Y Past Surgical History:  
Procedure Laterality Date  HX APPENDECTOMY  HX CORONARY ARTERY BYPASS GRAFT    
 triple  HX HERNIA REPAIR    
 HX IMPLANTABLE CARDIOVERTER DEFIBRILLATOR  NH CARDIOVERSION ELECTIVE ARRHYTHMIA EXTERNAL N/A 6/10/2019 EP CARDIOVERSION performed by Magdi Martinze MD at [place] [address], [facility] CATH LAB  NH CARDIOVERSION ELECTIVE ARRHYTHMIA EXTERNAL N/A 6/18/2019 EP CARDIOVERSION performed by Vikash Staples MD at [place] [address], [facility] [address] CATH LAB  NH INSJ/RPLCMT PERM DFB W/TRNSVNS LDS 1/DUAL CHMBR N/A 6/21/2019 INSERT ICD BIV MULTI performed by Tahir Ness MD at [place] [address], [facility] CATH LAB  NH TCAT IMPL WRLS P-ART PRS SNR L-T HEMODYN MNTR N/A 9/18/2019 IMPLANT HEART FAILURE MONITORING DEVICE performed by Jennifer Silva MD at [place] [address], [facility] CATH LAB Past Social History:   
Social History Socioeconomic History  Marital status:  Spouse name: Not on file  Number of children: Not on file  Years of education: Not on file  Highest education level: Not on file Occupational History  Not on file Social Needs  Financial resource strain: Not on file  Food insecurity:  
  Worry: Not on file Inability: Not on file  Transportation needs:  
  Medical: Not on file Non-medical: Not on file Tobacco Use  Smoking status: Former Smoker Last attempt to quit: 2010 Years since quittin.9  Smokeless tobacco: Never Used Substance and Sexual Activity  Alcohol use: Not Currently Comment: rarely  Drug use: Never  Sexual activity: Not on file Lifestyle  Physical activity:  
  Days per week: Not on file Minutes per session: Not on file  Stress: Not on file Relationships  Social connections:  
  Talks on phone: Not on file Gets together: Not on file Attends Sabianism service: Not on file Active member of club or organization: Not on file Attends meetings of clubs or organizations: Not on file Relationship status: Not on file  Intimate partner violence:  
  Fear of current or ex partner: Not on file Emotionally abused: Not on file Physically abused: Not on file Forced sexual activity: Not on file Other Topics Concern [address] Service Not Asked  Blood Transfusions Not Asked  Caffeine Concern Not Asked  Occupational Exposure Not Asked Rodolph New Hazards Not Asked  Sleep Concern Not Asked  Stress Concern Not Asked  Weight Concern Not Asked  Special Diet Not Asked  Back Care Not Asked  Exercise Not Asked  Bike Helmet Not Asked  Seat Belt Not Asked  Self-Exams Not Asked Social History Narrative  Not on file Immunization History: There is no immunization history for the selected administration types on file for this patient. Allergies:  
No Known Allergies Medications: *ANTIPLATELET &/or ANTICOAGULATION:  
 
No Known Allergies No current facility-administered medications on file prior to encounter. Current Outpatient Medications on File Prior to Encounter Medication Sig  
 apixaban (ELIQUIS) 5 mg tablet Take 5 mg by mouth two (2) times a day.  escitalopram oxalate (LEXAPRO) 5 mg tablet TAKE 1 TABLET BY MOUTH EVERY DAY  torsemide (DEMADEX) 20 mg tablet Take 3 Tabs by mouth two (2) times a day.  tamsulosin (FLOMAX) 0.4 mg capsule Take 0.4 mg by mouth daily.  carvedilol (COREG) 6.25 mg tablet Take 1 Tab by mouth two (2) times daily (with meals).  milrinone (PRIMACOR) 20 mg/100 mL (200 mcg/mL) infusion 18.14 mcg/min by IntraVENous route continuous.  aspirin delayed-release 81 mg tablet Take 81 mg by mouth daily.  spironolactone (ALDACTONE) 25 mg tablet Take 25 mg by mouth daily. TESTING: 
Echocardiogram: 11/6/19 Interpretation Summary · Left Ventricle: Normal wall thickness. Severely dilated left ventricle. Severe systolic dysfunction. Estimated left ventricular ejection fraction is <15%. Visually measured ejection fraction. · Mitral Valve: Mild mitral valve regurgitation is present. · Right Ventricle: Dilated right ventricle. Pacer/ICD present. · Left Atrium: Moderately dilated left atrium. · Right Atrium: Mildly dilated right atrium. · Tricuspid Valve: Mild tricuspid valve regurgitation is present. Comparison Study Information Prior Study There is a prior study available for comparison that was performed on 11/4/2019. Echo Findings Left Ventricle Normal wall thickness. Severely dilated left ventricle. Severe systolic dysfunction. The estimated ejection fraction is <15%. Visually measured ejection fraction. Unable to assess diastolic function. Left Atrium Moderately dilated left atrium. No atrial septal defect present. No patent foramen ovale visualized. Right Ventricle Normal wall thickness and global systolic function. Dilated right ventricle. Pacer/ICD present. Right Atrium Mildly dilated right atrium. Aortic Valve Aortic valve not well visualized. An Impella catheter is visualized crossing the aortic valve in appropriate position. Shameka Givens Mitral Valve Mitral valve not well visualized. Mild regurgitation. Tricuspid Valve Tricuspid valve not well visualized. Mild tricuspid valve regurgitation. Pulmonary arterial systolic pressure is 36.4 mmHg. Pulmonic Valve Pulmonic valve not well visualized. Aorta Normal aortic root, ascending aortic, and aortic arch. Pericardium Normal pericardium and no evidence of pericardial effusion. TTE procedure Findings TTE Procedure Information Image quality: good. The view(s) performed were parasternal, apical, subcostal and suprasternal. Color flow Doppler was performed and pulse wave and/or continuous wave Doppler was performed. No contrast was given. Procedure Staff Technologist/Clinician: LYNSEY Wallace Supporting Staff: None Performing Physician/Midlevel: None 
  
Exam Completion Date/Time: 11/6/19  3:51 PM  
2D Volume Measurements ESV ESV Index LA Biplane 106.99 mL (Range: 18 - 58) 51.1 ml/m2 (Range: 16 - 28) LA A4C 104.55 mL (Range: 18 - 58) 49.94 ml/m2 (Range: 16 - 28) LA A2C 89.85 mL (Range: 18 - 58) 42.92 ml/m2 (Range: 16 - 28) 2D/M-Mode Measurements Dimensions Measurement Value (Range) LVIDs 6.76 cm  
  
LVIDd 7.36 cm (4.2 - 5.9) IVSd 1.01 cm (0.6 - 1.0) LVPWd 1.05 cm (0.6 - 1.0) LV Mass .3 g (88 - 224) LV Mass AL Index 210.8 g/m2 (49 - 115) LA Area 4C 29.8 cm2 Tapse 1.4 cm (1.5 - 2.0) RVIDd 5.4 cm Mitral Valve Measurements Stenosis Mitral Valve Pressure Half-time 62.1 ms  
  
MVA (PHT) 3.5 cm2 Regurgitation Mitral Regurgitant Peak Velocity 410.81 cm/s  
  
MR Peak Gradient 67.5 mmHg Tricuspid Valve Measurements Stenosis Est. RA Pressure 10 mmHg PASP 60.9 mmHg RVSP 60.9 mmHg Regurgitation Triscuspid Valve Regurgitation Peak Gradient 50.9 mmHg  
  
TR Max Velocity 356.73 cm/s Pulmonary Measurements Stenosis/Regurgitation PV peak gradient 0.9 mmHg Pulmonic Valve Max Velocity 47.81 cm/s Diastolic Filling/Shunts Diastolic Filling LV E' Septal Velocity 3.62 cm/s  
  
E/E' septal 26.33   
  
LV E' Lateral Velocity 7.91 cm/s  
  
E/E' lateral 12.05   
  
E/E' ratio (averaged) 19.19 Mitral Valve E Wave Deceleration Time 214.2 ms  
  
MV E Isaac 95.32 cm/s  
  
MV A Isaac 29.53 cm/s  
  
MV E/A 3.23 Signed Electronically signed by Adrián Mathias MD on 11/7/19 at 1320 EST Results for orders placed or performed during the hospital encounter of 10/25/19 EKG, 12 LEAD, INITIAL Result Value Ref Range Ventricular Rate 80 BPM  
 Atrial Rate 76 BPM  
 QRS Duration 174 ms Q-T Interval 462 ms QTC Calculation (Bezet) 532 ms Calculated R Axis 28 degrees Calculated T Axis 150 degrees Diagnosis Atrial flutter Left bundle branch block When compared with ECG of 25-OCT-2019 18:20, 
Electronic demand pacing is not noted Confirmed by Tunde Leal ([id number]) on 10/26/2019 6:07:36 PM 
  
 
 
 
 
Right Heart Catheterization:  [place]-Russ D/C'ed 11/3/19 - On Impella Support (see [facility] report) [place]-Russ catheter left in place for monitoring: Y/N 
Complications:  
  Pressure (S/D/EDP mmHg)  Mean (mmHg)  V-waves (mmHg) Right Atrium Right Ventricle Pulmonary Artery Wedge Heart Rate   bpm   
Blood Pressure   mmHg PA saturation   % Ao saturation   % Brad CO   L/min Brad CI   L/min/m2 Pulm Vasc Resistance   MIKE Thermodilution CO   L/min Thermodilution CI   L/min/m2 Impression:  
 
 
 
 
Left Heart Catheterization:  
 [phone] [id number] Dept: [phone] Loc: [phone] 
   
   
[facility]ona K[facility] CARDIAC PROCEDURE Order# [id number] Reading physician: Charley Horne MD Ordering physician: Felix Mac MD DONTE Study date: 19 Patient Information Patient Name Nickie Reddy MRN 
[id number] Sex Male    
1950 (75 y.o.) Physicians Panel Physicians Referring Physician Case Authorizing Physician Aleyda Arevalo MD (Primary)  Aleyda Arevalo MD  
Assisting Physician None Procedures LEFT AND RIGHT HEART CATH / CORONARY ANGIOGRAPHY Pre-procedure Diagnosis Acute on chronic combined systolic and diastolic congestive heart failure ([address] Post-procedure Diagnosis Acute on chronic combined systolic and diastolic congestive heart failure ([address] end-stage CHF Indications Acute on chronic combined systolic and diastolic congestive heart failure ([address] [I50.43 (ICD-10-CM)] Conclusion  
 
  
· Catheters used: 5 Fr JL4, JR4 diagnostic catheters · Successful hemostasis of the 5 Fr RFA and 6 Fr RFV access site using manual compression · Impella inflow and outflow appropriately positioned with distortion of the distal pigtail portion 1. Native LM trifurcates into the LAD which is 100% occluded at its ostium, the ramus intermedius which has a 95% ostial stenosis and the true AV groove circumflex which has a 95% ostial stenosis.  The only coronary territory that is not well-perfused is the true AV groove circumflex which gives off a diminutive OM1 and a small LPL branch with the true AV groove circumflex continuing in the AV groove and giving off an atrial branch. 
  
2. Minimal non-obstructive CAD throughout the dominant right coronary artery.  
  
3. Widely patent LIMA-LAD and widely patent SVG-Ramus Intermedius with a prominent valve seen in the distal third of the SVG and otherwise minimal luminal irregularities throughout the SVG.   
4.  RHC shows normal right (RA mean 5) and left (PCWP mean 16, notably V wave of 35 suggestive of mitral regurgitation; LVEDP 16) filling pressures with mildly elevated PA pressures (PA mean 30), normal PVR (1.89 [place]) and Brad CI 3.44 L/min/m2 (using 125*BSA formula).   
  
5. Revascularization of the true AV groove LCx is unlikely to be of significant benefit due to the small vascular territory that this subtends.  Also, this territory may not be viable and furthermore, revascularization of this territory is unlikely to have a significant impact on the patient's clinical outcomes with LVAD therapy.   
  
6. Distortion of the distal pigtail portion of the Impella with the Impella inflow and outflow appropriately positioned. Dr. Angeles Odell was contacted to review the Impella position and he recommended leaving the Impella as is due to low suspicion that the pigtail was interacting with the mitral valve. 
  
7. The case was discussed with the patient and the referring physician, Dr. Corbin Crum. 
   
Complications Complications documented before study signed (11/14/2019 12:22 AM EST) No complications were associated with this study. Documented by Jesse Tan MD - 11/13/2019 11:59 PM EST Coronary Findings Diagnostic Dominance: Right Left Main Dist LM to Ost LAD lesion 100% stenosed. .  
Ramus Intermedius Ost Ramus lesion 95% stenosed. .  
Left Circumflex Ost Cx lesion 95% stenosed. .  
Right Coronary Artery The vessel is large. The vessel exhibits minimal luminal irregularities. Graft to Ramus The graft exhibits minimal luminal irregularities. Prominent valve noted in distal third of SVG. LIMA Graft to Mid LAD Intervention No interventions have been documented. Measurements EDP: 16 Right Heart Right Heart Cath Ben [place]-Russ catheter inserted via right femoral vein. Mild pulmonary hypertension noted. PA pressure = 50/16 mmHg. PA mean = 30 mmHg. PA Sat = 59 %. Mid RA pressure = 10/7 mmHg. Mid RA mean = 5 mmHg. RV pressure = 50/0 mmHg. Wedge pressure = 16 mmHg. LVEDP pressure = 16 mmHg. AO Sat = 90 %. Brad CO = 7.4 L/min. PVR = 2. Coronary Diagram  
 
Diagnostic Dominance: Right Intervention Phase: Baseline Data Systolic Diastolic Mean dP/dt A Wave V Wave RV Pressures 49 mmHg 5 mmHg 485 mmHg/sec PA Pressures 49 mmHg 15 mmHg 29 mmHg LV Pressures 96 mmHg 12 mmHg 824 mmHg/sec AO Pressures 87 mmHg 68 mmHg 76 mmHg RA Pressures   5 mmHg 9 mmHg 7 mmHg PCW Pressures   18 mmHg 16 mmHg 33 mmHg Phase: Baseline Data HR TDCO TDCI Brad CI PVR/SVR TPR/TVR Cardiac Output Results    3.44 L/min/m2 Resistance Results     0.15  
 0.38 Blood Oximetry PA O2 Sat  
59 % Sedation Time Moderate conscious sedation of 1 hour 7 minutes 19 seconds was performed by an independent provider giving the medication per my discretion and monitoring the vital signs throughout the case. Radiation Exposure Event Details User 4:40 PM 11/13/19 Radiation Tracking Panel 1: Jvoan Nuñez MD  
Invasive Radiation (mGy) = 1811.000; Fluoro Time (min) = 14.9; DAP (cGy.cm2) = 54798.000 ND Estimated Blood Loss/Specimen Collection No specimen collected. Estimated Blood Loss: <30 mL. Signed Electronically signed by Jovan Nuñez MD on 11/14/19 at [address] Cardiopulmonary Exercise Testing (VO2)  - On Impella support Date:  
Exercise Protocol:  
Exercise Time: METS achieved:  
Exercise Stopped:  
ECG:  
 HR  % MPHR  BP  RQ  VO2  % pred VO2 Baseline AT Peak Anaerobic threshold was:   
VE/VCO2 slope: HR recovery:  
 
ABG (Date     11/6/19 ) pH PCO2 PO2 HCO3 BE Sat  
7.453 38.4 86 26.9 3 97  
 
 
 
6 Minute Walk Test (Date): N/A Impella 6MWT Date:   
Pre/Post HR:   
Pre/Post SpO2:   
Distance (meters): Chest CT or Chest X-ray (Date): XR Results (most recent): 
Results from Hospital Encounter encounter on 10/25/19 XR CHEST PORT  
 Narrative Portable chest single view dated 10/29/2019 at 10:25 AM 
 
Comparison is chest from earlier same day (3:56 AM). History is postop heart. A single portable AP supine view of the chest was obtained. The patient is 
slightly rotated towards right. The convex left hip is enlarged. There is been 
interval worsening of the congestive change/pulmonary edema. Veiling opacity is 
noted at both lung bases. This is compatible with the presence of bilateral 
pleural effusions. Superimposed parenchymal disease at the lung bases cannot be 
excluded. There has been interval placement of endotracheal tube the tip which 
is situated just below the level of the thoracic inlet. The reginaldo is not well 
visualized. The [place]-Russ catheter and PICC line on the right remain unchanged 
in position. Impression IMPRESSION: 
1. Interval worsening of the congestive cardiac failure/pulmonary edema. Presence of bilateral pleural effusions. 2. Interval placement of endotracheal tube described above. 3. No change in the position of the [place]-Russ catheter and the PICC line on the 
right. CT Results (most recent): 
Results from Hospital Encounter encounter on 10/25/19 CT ABD PELV WO CONT Narrative INDICATION:  Heart failure. LVAD eval  
 
EXAM: CT Chest, Abdomen and Pelvis. CT dose reduction was achieved through the 
use of a standardized protocol tailored for this examination and automatic 
exposure control for dose modulation. Contrast: None. CHEST:  
The lungs are emphysematous but without infiltrate or edema. There is low-grade 
nonspecific noncalcified mediastinal adenopathy. There is trace right pleural 
effusion. There is no left pleural or pericardial effusion. Heart is large. There is prior CABG. ICD is present. Visualized thyroid and lower neck soft 
tissues are unremarkable for age. ABDOMEN PELVIS: 
There are colonic diverticula. Bowels are not dilated.  There is no inflammation, 
 free air or significant adenopathy. Liver is uniform in texture. Bile ducts and spleen are not enlarged. Pancreas 
shows no mass or inflammation. Adrenal glands are normal in size. Kidneys show 
no stone, mass or hydronephrosis; left kidney is moderately atrophic. There is aortoiliac calcification. Abdominal aorta is mildly ectatic distally, 
2.5 cm diameter. The bladder is moderately distended with several diverticula. The distal ureters 
are not dilated. There is no pelvic mass. Impression IMPRESSION:  
1. Low-grade nonspecific mediastinal adenopathy. 2. Trace right pleural effusion. 3. Emphysema. 4. Left renal atrophy. 5. Colonic diverticula. 6. Moderate bladder distention with diverticula. PFTs (Date):  
 
 Predicted Measured % FVC 5.16 2.91/2.98 56/58 FEV1 3.82 2.25/2.17 59/57 FEV1/FVC 0.74 0.77/0.73 105/98 DLCO Abdominal Ultrasound (Date): US Results (most recent): 
Results from Hospital Encounter encounter on 05/31/19 US RETROPERITONEUM COMP Narrative Indication: Acute renal failure with urinary retention Realtime sonographic imaging of the retroperitoneum was performed. The right 
kidney measures 10.6 cm and the left kidney measures 11.4 cm. They are normal in 
size and appearance without evidence of mass lesion, hydronephrosis, or 
calcification. The bladder is unremarkable as is the visualized portion of the 
abdominal aorta and IVC. The common iliac bifurcation is not visualized due to 
bowel gas. Impression IMPRESSION: Normal renal ultrasound. Carotid Dopplers (Date): VAS/US Results (most recent):10/25/19 Interpretation Summary · Less than 50% stenosis of the right internal carotid artery. · Less than 50% (low end of range) stenosis of the left internal carotid artery. · Vertebrals are patent with antegrade flow bilaterally. Cerebrovascular Findings Right Carotid Gray scale of carotid bulb shows heterogenous/calcific plaque. The right CCA is patent. There is mild stenosis in the right ICA (<50%). The right ECA is patent. The right vertebral is antegrade. Left Carotid Rodarte scale of the carotid bulb gray shows heterogenous/calcific plaque. The left CCA is patent. There is mild stenosis in the left ICA (<50%). (Low end of range.) Gray scale of proximal ICA shows focal heterogenous/calcific plaque near origin. The left proximal ICA has turbulent flow. The left ECA is patent. The left vertebral is antegrade. Carotid Measurements Right PSV Right EDV Left PSV Left EDV  
CCA Prox 43 cm/s  
  
 10.6 cm/s  
  
 60.5 cm/s  
  
 20.3 cm/s CCA Dist 46.2 cm/s  
  
 16.6 cm/s  
  
 53.5 cm/s  
  
 16.8 cm/s ICA Prox 72.7 cm/s  
  
 33.4 cm/s  
  
 58.5 cm/s  
  
 26.5 cm/s ICA Mid 44.8 cm/s  
  
 15.1 cm/s  
  
 57.2 cm/s  
  
 23 cm/s ICA Dist 27.6 cm/s  
  
 11.1 cm/s  
  
 32 cm/s  
  
 12.2 cm/s ICA/CCA Ratio 1.6 1.09   
  
   
ECA 42.9 cm/s 2.35 cm/s  
  
 54.4 cm/s  
  
 9.86 cm/s Vertebral 36.3 cm/s  
  
 12.77 cm/s  
  
 28.6 cm/s  
  
 5.75 cm/s Procedure Staff Technologist/Clinician: Soraya Medellin Supporting Staff: None Performing Physician/Midlevel: None 
  
Exam Completion Date/Time: 10/25/19  6:06 PM  
Signed Electronically signed by Dayna Burrows MD on 10/28/19 at 6577 EDT Lower Extremity Dopplers (Date): 10/28/19 JOAQUÍN Right arm not accessible for brachial pressure measurement. JOAQUÍN is normal on the right and the left. PVR waveforms are normal at the right and left ankle. PPG waveforms are normal at the right and left great toe. Arterial Pressure Measurements Right Left Brachial BP   
 100 mmHg Post Tibial  mmHg 120 mmHg Tibial/DP  mmHg 120 mmHg JOAQUÍN 1.2   
  
 1.2 Procedure Staff Technologist/Clinician: Alka Reagan Supporting Staff: None Performing Physician/Midlevel: None 
  
Exam Completion Date/Time: 10/28/19 12:50 PM  
Signed Electronically signed by Chandana Elizalde MD on 10/28/19 at [id number] EDT  
 
 
 
KUB (Date): Other: 
 
 
If Indicated: (may be completed prior to [facility] Mammogram: N/A Pap Smear: N/A Colonoscopy:  
Colonoscopy Operative Report 
  
Sona Kiser [id number] 1950 
  
  
Procedure Type:   Colonoscopy --diagnostic  
  
Indications:    Iron deficiency anemia  
  
  
Pre-operative Diagnosis: see indication above 
  
Post-operative Diagnosis:  See findings below 
  
:  Kely Smalls MD 
  
Surgical Assistant: None 
  
Implants:  None 
  
Referring Provider:   Rafael Davis MD 
  
  
Sedation:  MAC anesthesia Propofol 
  
  
Procedure Details:  After informed consent was obtained with all risks and benefits of procedure explained and preoperative exam completed, the patient was taken to the endoscopy suite and placed in the left lateral decubitus position. Upon sequential sedation as per above, a digital rectal exam was performed demonstrating internal hemorrhoids. The Olympus videocolonoscope  was inserted in the rectum and carefully advanced to the cecum, which was identified by the ileocecal valve and appendiceal orifice. The cecum was identified by the ileocecal valve and appendiceal orifice. The quality of preparation was adequate. The colonoscope was slowly withdrawn with careful evaluation between folds. Retroflexion in the rectum was completed .  
  
Findings:  
Rectum: normal 
Sigmoid: moderate diverticulosis Descending Colon: mild diverticulosis Transverse Colon: normal 
Ascending Colon: normal 
Cecum: normal 
Liquid stool seen throughout colon, suctioned out 
  
  
  
Specimen Removed:  none 
  
Complications: None.  
  
EBL:  None. 
  
Impression:    see findings 
  
Recommendations: --resume PO   
  
 No need for further GI testing at this time since no GI  bleeding Will follow Colonoscopy in 5 years 
  
Signed By: Ricardo Esquivel MD   
  11/11/2019  1:29 PM  
 
[facility]- Gastroduodenoscopy (EGD) Procedure Note 
  
Sona Kiser 1950 
[id number] 
  
  
Procedure: Endoscopic Gastroduodenoscopy --diagnostic 
  
Indication:  Iron deficiency anemia  
  
Pre-operative Diagnosis: see indication above 
  
Post-operative Diagnosis: see findings below 
  
: Ricardo Esquivel MD 
  
Surgical Assistant: None 
  
Implants:  None 
  
Referring Provider:  Caryl Adrian MD 
  
  
Anesthesia/Sedation:  MAC anesthesia Propofol 
  
   
Procedure Details  
  
After infomed consent was obtained for the procedure, with all risks and benefits of procedure explained the patient was taken to the endoscopy suite and placed in the left lateral decubitus position. Following sequential administration of sedation as per above, the endoscope was inserted into the mouth and advanced under direct vision to third portion of the duodenum. A careful inspection was made as the gastroscope was withdrawn, including a retroflexed view of the proximal stomach; findings and interventions are described below.   
  
Findings:  
Esophagus:normal 
Stomach: normal  
Duodenum/jejunum: normal 
  
  
Therapies:  none 
  
Specimens: none EBL: None Complications:   None; patient tolerated the procedure well. Impression:   
-See post-procedure diagnoses. 
  
Recommendations: 
-colonoscopy today 
  
Signed By: Ricardo Esquivel MD   
  11/11/2019  1:28 PM  
 
 
 
Dental Clearance:  N/A 
 
CONSULTATIONS: (additional [facility] consults will be done at [address] [place] [address] Heart Failure Cardiologist (Date): Vika Ibarra MD 
 
Cardiac Surgeon (Date): Sachi Vincent MD 
 
Social Work (Date): Hattie Murphy LCSW Financial (Date): Hattie Murphy LCSW Nutrition (Date): Judith Leong, JEANNINE 
 
 Research: (as applicable) Transplant Education: Acknowledgement & Consents signed (date): 11/8/19 VAD Education Complete:  
Yes- ongoing. Home Inspection Checklist:  
Yes Other:

## 2019-10-30 NOTE — PROGRESS NOTES
Renal Dosing/Monitoring Medication: famotidine Current regimen:  20 every 12 hr 
Recent Labs 10/30/19 
0448 10/29/19 
6504 10/28/19 
2800 CREA 1.88* 1.59* 1.59* BUN 28* 24* 20  
 
Estimated CrCl:  ~42 ml/min Plan: Change to famotidine PO Q24h per renal dose adjustment policy

## 2019-10-31 NOTE — CONSULTS
[facility] at [address], [id number] [place] [address] Road, [address] [place] [address] Luis W: [phone]  F: [phone] Reason for Visit:  
Milagro Holley is a 71 y.o. male who is seen in consultation at the request of Dr. Ximena Hong for evaluation of lung cancer, thrombocytopenia and iron deficiency anemia Treatment History: · Erythropoietin · History of Present Illness: The patient is a very pleasant approximately an 80-pack-year history of tobacco consumption who stopped smoking about a decade ago when he had his heart attack. He is done quite well from that perspective. He does use oxygen occasionally at night when he is at home. Recent CT scan suggests that he may very well have a bronchogenic carcinoma with mediastinal lymphadenopathy. Work-up of that process is still in progress and is scheduled for a PET scan after discharge. Patient does have iron deficiency with an iron saturation of 9% has had some problems constipation and with blood in his urine. He will need to have a colonoscopy at some point to rule out a GI source of blood loss. And will end up needing a cystoscopy due to the hematuria and his history of tobacco consumption to rule out a bladder cancer. Patient is doing much better from his heart failure perspective. He says he is been able to diurese a lot of fluid and his breathing is much improved. He is on oxygen at this time and is sitting in a chair and appears to be very comfortable. Platelet count on admission was approximately 175,000 it is now at 91,000. Serotonin releasing test was done today. Past Medical History:  
Diagnosis Date  Degenerative disc disease, lumbar  Heart failure ([address]  High cholesterol  Hypertension  Paroxysmal atrial fibrillation ([address] 4/2/2019  Spinal stenosis Past Surgical History:  
Procedure Laterality Date  HX APPENDECTOMY  HX CORONARY ARTERY BYPASS GRAFT    
 triple  HX HERNIA REPAIR    
 HX IMPLANTABLE CARDIOVERTER DEFIBRILLATOR  AK CARDIOVERSION ELECTIVE ARRHYTHMIA EXTERNAL N/A 6/10/2019 EP CARDIOVERSION performed by Zahra Wheeler MD at [address], [facility] [address] CATH LAB  AK CARDIOVERSION ELECTIVE ARRHYTHMIA EXTERNAL N/A 2019 EP CARDIOVERSION performed by Osman Gómez MD at [facility] [address], [facility] [address] CATH LAB  AK INSJ/RPLCMT PERM DFB W/TRNSVNS LDS 1/DUAL CHMBR N/A 2019 INSERT ICD BIV MULTI [facility] [address] CATH LAB  AK TCAT IMPL WRLS P-ART PRS SNR L-T HEMODYN MNTR N/A 2019 IMPLANT HEART FAILURE MONITORING DEVICE performed by Solitario Pulliam MD at [facility], [facility] [address] CATH LAB Social History Tobacco Use  Smoking status: Former Smoker Last attempt to quit: 2010 Years since quittin.9  Smokeless tobacco: Never Used Substance Use Topics  Alcohol use: Not Currently Comment: rarely Family History Problem Relation Age of Onset  Lung Disease Mother  Hypertension Mother Josue Thorne Arthritis-osteo Mother  Heart Disease Mother  Heart Disease Father  Diabetes Father Current Facility-Administered Medications Medication Dose Route Frequency  epoetin yvonne-epbx (RETACRIT) injection 10,000 Units  10,000 Units SubCUTAneous Q TUE, THU & SAT  phytonadione (vitamin K1) (AQUA-MEPHYTON) 10 mg in 0.9% sodium chloride 50 mL IVPB  10 mg IntraVENous ONCE  
 [START ON 2019] pantoprazole (PROTONIX) tablet 40 mg  40 mg Oral ACB  dextrose 5% infusion  4-20 mL/hr IntraVENous CONTINUOUS  
 bivalirudin (ANGIOMAX) 250 mg in dextrose 5% 250 mL infusion  4-20 mL/hr Other TITRATE  alteplase (CATHFLO) 1 mg in sterile water (preservative free) 1 mL injection  1 mg InterCATHeter PRN  
 bacitracin 500 unit/gram packet 1 Packet  1 Packet Topical PRN  
 sodium chloride (NS) flush 5-40 mL  5-40 mL IntraVENous Q8H  
 sodium chloride (NS) flush 5-40 mL  5-40 mL IntraVENous PRN  
  0.45% sodium chloride infusion  10 mL/hr IntraVENous CONTINUOUS  
 0.9% sodium chloride infusion  100 mL/hr IntraVENous CONTINUOUS  
 oxyCODONE IR (ROXICODONE) tablet 5 mg  5 mg Oral Q4H PRN  
 oxyCODONE IR (ROXICODONE) tablet 10 mg  10 mg Oral Q4H PRN  
 morphine injection 4 mg  4 mg IntraVENous Q2H PRN  
 naloxone (NARCAN) injection 0.4 mg  0.4 mg IntraVENous PRN  
 mupirocin (BACTROBAN) 2 % ointment   Both Nostrils BID  ondansetron (ZOFRAN) injection 4 mg  4 mg IntraVENous Q4H PRN  
 albuterol (PROVENTIL VENTOLIN) nebulizer solution 2.5 mg  2.5 mg Nebulization Q4H PRN  chlorhexidine (PERIDEX) 0.12 % mouthwash 10 mL  10 mL Oral Q12H  
 magnesium oxide (MAG-OX) tablet 400 mg  400 mg Oral BID  calcium chloride 1 g in 0.9% sodium chloride 250 mL IVPB  1 g IntraVENous PRN  
 bisacodyl (DULCOLAX) suppository 10 mg  10 mg Rectal DAILY PRN  
 senna-docusate (PERICOLACE) 8.6-50 mg per tablet 1 Tab  1 Tab Oral BID  polyethylene glycol (MIRALAX) packet 17 g  17 g Oral DAILY  ELECTROLYTE REPLACEMENT NOTE: Nurse to review Serum Potassium and Magnesuim levels and Initiate Electrolyte Replacement Protocol as needed  1 Each Other PRN  
 magnesium sulfate 1 g/100 ml IVPB (premix or compounded)  1 g IntraVENous PRN  
 glucose chewable tablet 16 g  4 Tab Oral PRN  
 glucagon (GLUCAGEN) injection 1 mg  1 mg IntraMUSCular PRN  
 dextrose 10% infusion 0-250 mL  0-250 mL IntraVENous PRN  
 morphine injection 2 mg  2 mg IntraVENous Q2H PRN  
 melatonin tablet 3 mg  3 mg Oral QHS PRN  
 ceFAZolin (ANCEF) 2 g/20 mL in sterile water IV syringe  2 g IntraVENous Q8H  
 albumin human 5% (BUMINATE) solution 25 g  25 g IntraVENous Q2H PRN  
 insulin lispro (HUMALOG) injection   SubCUTAneous Q6H  
 [Held by provider] spironolactone (ALDACTONE) tablet 25 mg  25 mg Oral DAILY  milrinone (PRIMACOR) 20 MG/100 ML D5W infusion  0.3 mcg/kg/min IntraVENous CONTINUOUS  
  amiodarone (CORDARONE) tablet 100 mg  100 mg Oral BID  influenza vaccine 2019-20 (6 mos+)(PF) (FLUARIX/FLULAVAL/FLUZONE QUAD) injection 0.5 mL  0.5 mL IntraMUSCular PRIOR TO DISCHARGE  
 [Held by provider] aspirin delayed-release tablet 81 mg  81 mg Oral DAILY  tamsulosin (FLOMAX) capsule 0.8 mg  0.8 mg Oral DAILY  allopurinol (ZYLOPRIM) tablet 100 mg  100 mg Oral DAILY No Known Allergies Review of Systems: A complete review of systems was obtained, negative except as described above. Physical Exam:  
 
Visit Vitals /55 (BP 1 Location: Left arm, BP Patient Position: At rest) Pulse 85 Temp 98.4 °F (36.9 °C) Resp 16 Ht 6' 2\" (1.88 m) Wt 177 lb 0.5 oz (80.3 kg) SpO2 100% BMI 22.73 kg/m² ECOG PS: 2 General: No distress Eyes: PERRLA, anicteric sclerae HENT: Atraumatic, OP clear Neck: Supple Lymphatic: No cervical, supraclavicular, axillary adenopathy Respiratory: CTAB, normal respiratory effort CV: Normal rate, regular rhythm GI: Soft, nontender, nondistended, no masses, no hepatomegaly, no splenomegaly MS:  Digits without clubbing or cyanosis. Skin: No rashes, ecchymoses, or petechiae. Normal temperature, turgor, and texture. Psych: Alert, oriented, appropriate affect, normal judgment/insight Results:  
 
Lab Results Component Value Date/Time WBC 4.1 10/31/2019 05:10 AM  
 HGB 8.9 (L) 10/31/2019 05:10 AM  
 HCT 28.8 (L) 10/31/2019 05:10 AM  
 PLATELET 91 (L) 79/89/7905 05:10 AM  
 MCV 92.6 10/31/2019 05:10 AM  
 ABS. NEUTROPHILS 3.6 10/26/2019 03:33 PM  
 
Lab Results Component Value Date/Time  Sodium 130 (L) 10/31/2019 05:10 AM  
 Potassium 3.8 10/31/2019 05:10 AM  
 Chloride 94 (L) 10/31/2019 05:10 AM  
 CO2 32 10/31/2019 05:10 AM  
 Glucose 97 10/31/2019 05:10 AM  
 BUN 27 (H) 10/31/2019 05:10 AM  
 Creatinine 1.57 (H) 10/31/2019 05:10 AM  
 GFR est AA 53 (L) 10/31/2019 05:10 AM  
 GFR est non-AA 44 (L) 10/31/2019 05:10 AM  
 Calcium 8.4 (L) 10/31/2019 05:10 AM  
 Glucose (POC) 197 (H) 10/31/2019 05:07 AM  
 
Lab Results Component Value Date/Time Bilirubin, total 2.4 (H) 10/31/2019 05:10 AM  
 ALT (SGPT) 12 10/31/2019 05:10 AM  
 AST (SGOT) 34 10/31/2019 05:10 AM  
 Alk. phosphatase 98 10/31/2019 05:10 AM  
 Protein, total 5.9 (L) 10/31/2019 05:10 AM  
 Albumin 2.9 (L) 10/31/2019 05:10 AM  
 Globulin 3.0 10/31/2019 05:10 AM  
 
 
Records reviewed and summarized above. Radiology report(s) reviewed CAT CAP 10/25/19 IMPRESSION:  
1. Low-grade nonspecific mediastinal adenopathy. 2. Trace right pleural effusion. 3. Emphysema. 4. Left renal atrophy. 5. Colonic diverticula. 6. Moderate bladder distention with diverticula Assessment:  
1) mediastinal lymphadenopathy possible bronchogenic carcinoma. 2) anemia with iron deficiency need to be concerned about colonic and bladder cancer. 3) thrombocytopenia work up in progress to R/o HIT 
 
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 When he is well enough he will need a colonoscopy and probably a cystoscopy. We will go ahead and get him some IV iron as an outpatient if needed. · I will plan on seeing him in the office to help coordinate these issues. ·  
I appreciate the opportunity to participate in Mr. Sona Kiser's care.  
 
Signed By: Roni Alegre MD

## 2019-10-31 NOTE — DIABETES MGMT
[facility] Progress Note Recommendations/ Comments: Chart reviewed due to hyperglycemia.  mg/dl this morning. If appropriate, please consider: add no conc. Sweets to diet order Current hospital DM medications Lispro normal sensitivity correction scale Chart reviewed on Sona Kiser. Patient is 71 y.o. male s/p Impella - POD 2. No hx DM. A1c indicative of dx of diabetes. Preparing for LVAD 
 
DTC will follow as needed A1c:  
Lab Results Component Value Date/Time Hemoglobin A1c 6.6 (H) 10/25/2019 02:56 PM  
 
 
 
Recent Glucose Results:  
Lab Results Component Value Date/Time GLU 97 10/31/2019 05:10 AM  
 GLUCPOC 197 (H) 10/31/2019 05:07 AM  
 GLUCPOC 113 (H) 10/30/2019 11:03 PM  
 GLUCPOC 160 (H) 10/30/2019 06:20 PM  
  
 
Lab Results Component Value Date/Time Creatinine 1.57 (H) 10/31/2019 05:10 AM  
 
Estimated Creatinine Clearance: 50.4 mL/min (A) (based on SCr of 1.57 mg/dL (H)). Active Orders Diet DIET CARDIAC Regular PO intake:  
Patient Vitals for the past 72 hrs: 
 % Diet Eaten 10/28/19 1700 15 % 10/28/19 1300 120 % Will continue to follow as needed. Thank you. Aleshia Paris RD, Diabetes Clinician Time spent: 4 minutes

## 2019-10-31 NOTE — PROGRESS NOTES
1930 Bedside and Verbal shift change report given to Cat RN (oncoming nurse) by Navin Chapman RN (offgoing nurse). Report included the following information SBAR. 
 
2000 Complete CHG bath and bed change provided. Pt tolerated well. 
 
2200 Urine changed from erica to bloody. Juliana ACEVES aware. No orders received. 0510 Labs drawn 
 
0700 Potassium replaced per protocol. 0730 Bedside and Verbal shift change report given to [address] [place] [address] (oncoming nurse) by Cat RN (offgoing nurse). Report included the following information SBAR.

## 2019-10-31 NOTE — PROGRESS NOTES
[facility] Progress Note DOS:  10/31/2019 REFERRING PROVIDER:  Zulay Graham MD 
PRIMARY CARE PHYSICIAN: Jas Millard MD 
PRIMARY CARDIOLOGIST: Zulay Graham MD 
 
 
 
CC: DOUGLAS, fatigue HPI: Fahad Gibbs is a 71y.o. year old pleasant white male with a history of HTN, HLD, JOSE, CAD s/p cardiac arrest VFib s/p CABG (2011) c/b sternal would infection and sternectomy, ischemic cardiomyopathy LVEF 15-20%, s/p ICD and with LBBB. He was admitted with acute on chronic systolic heart failure with massive volume overload > 20 lbs, in the setting of atrial fibrillation s/p failed DCCV and underwent DCCV and RHC on 6/18. S/p BiVICD on 6/21/19 with Dr. Dre Porter. He was discharged home home on IV milrinone on 6/26/19. He has been followed closely by Dr. Tia Montana and the [facility]. Mr. Rocio Perez returns to clinic for follow up with his wife. His dyspnea has progressed to the point he is sitting in a chair most of the day. He is unable to perform simple ADLs without limiting dyspnea. His appetite is down. He underwent a sleep study and is using CPAP but has difficulty due to frequent nocturia. He denies presyncope or syncope. He has had a few falls due to generalized weakness but did not hit his head nor lose consciousness. Recent labs reveal stable creatinine from 1.9 - 2.1. He is currently taking torsemide 60 mg bid. His wife has noticed an odor to his urine. He denies fever/chills but admits to feeling cold all the time. Recent Events: 
Dobutamine weaned off 
CVP improved Hematuria IMPRESSION/PLAN: 
 Acute on chronic systolic heart failure 
 ICM, Stage D, NYHA Class IV, LVEF 16-20%, s/p CRT on 6/21/19 S/p Impella 5.0 implant 10/29 CRT non-responder Intolerant to RAASi d/t renal dysfunction Off BBrx while on dobutamine Continue IV milrinone to 0.4 mcgs/kg/min- will try to wean slowly Hemodynamic parameters CI > 2, CVP < 14, MAP > 65 Off dobutamine Stop IVF Hold diuretics Hold Spironolactone due to IVVD Trend PBNP, CMP Strict I/O Daily Weights Follow renal function and electrolytes closely Eval in process: Needs Chest CT- ordered Timing of colonoscopy, EGD early next week No dental needed- has dentures Continue pre- op education Shock Liver Improving after Impella placement LFTs trending down TBili elevated- likely will peak soon No Tolvaptan due to liver dysfunction History of VF arrest - s/p AICD No recent shocks Thrombocytopenia Platelets decreased today Monitor for now Likely multifactorial  
 
 CAD s/p CABG in 2010 Needs [facility]- timing TBD, hopefully early next week with Dr. Kary Melgar PAF s/p DCCV 6/18/19 Hold eliquis AC on hold Continue amiodarone Chronic Kidney Disease 3 - single kidney Nephrology following Renal US- no obstructive disease History of Urinary Retention UA negative- will repeat Lizama cath placed JOSE on CPAP Cont use home CPAP machine History of Sternal wound infection requiring sternectomy Stable CV surgery aware - not a contraindication for LVAD Tagged WBC negative ESR 30 Cortisol 22.4 Acute hematuria Hold bival via purge Hold ASA for now Repeat UA Consider urology consult Transfuse to keep hgb > 8 Repeat H/H today Anemia, iron deficiency Iron sat 9% S/p Venofer Repeat iron profile Sunday Hematology consult for positive Arelia Chancy H/o DVT 
 AC on hold for hematuria Migraines 
 stable H/o tobacco abuse Counseling - continued abstinence Depression On lexapro Anorexia - likely multifactorial (depression, congestive hepatopathy) Prealbumin 13.8 Prealbumin weekly Nutritional supplements Mediastinal Lymphadenopathy D.w with Dr. Chele Marrufo- does not think this is lung CA, patient is too high risk for lung biopsy Repeat chest CT w/o contrast today Vocal Cord Paralysis Speech therapy recs appreciated Aspiration Precautions Thickened liquids when able to take PO 
 
PPX Bowel regimen Ancef for Impella prophylaxis Advance diet as tolerated Hold purge Bival- start D5 via impella CARDIAC EVALUATION 
ECHO (5/31/19) LVEF 21-25%, severely dilated LA, moderately dilated RA 
ECHO (6/24/19) LVEF 16-20%, Severely dilated LV, trace MR, trace TR 
 
EKG (6/12/19) atrial flutter with occasional PVCs, LBBB QRS 158ms EKG (6/26/19) Atrial fibrillation with premature ventricular or aberrantly conducted complexes, Left bundle branch block, rate 86, , QTc 564 [facility] not in epic Review of Systems:    
Review of Systems Constitutional: Negative for chills, fever and malaise/fatigue. Feels thirsty Respiratory: Negative. Cardiovascular: Negative for chest pain and leg swelling. Gastrointestinal: Negative for heartburn, nausea and vomiting. Neurological: Positive for weakness. Negative for dizziness. Endo/Heme/Allergies: Does not bruise/bleed easily. Impella Flowsheets P8 Flow 4.4 Purge Pressure 324 Purge Flow 18 Physical Exam:  
 
Visit Vitals /55 (BP 1 Location: Left arm, BP Patient Position: At rest) Pulse 79 Temp 98.4 °F (36.9 °C) Resp 23 Ht 6' 2\" (1.88 m) Wt 177 lb 0.5 oz (80.3 kg) SpO2 96% BMI 22.73 kg/m² Hemodynamics: 
 CO: CO (l/min): 5.9 l/min CI: CI (l/min/m2): 2 l/min/m2 CVP: CVP (mmHg): 13 mmHg (10/31/19 1100) SVR: SVR (dyne*sec)/cm5: 814 (dyne*sec)/cm5 (10/31/19 1100) PAP Systolic: PAP Systolic: 60 (58/98/42 5451) PAP Diastolic: PAP Diastolic: 26 (44/94/28 3707) PVR:   
 SV02: SVO2 (%): 69 % (10/31/19 1100) SCV02: SCVO2 (%): 75 % (10/29/19 1900) Physical Exam  
Constitutional: He is oriented to person, place, and time and well-developed, well-nourished, and in no distress. He appears unhealthy. He appears cachectic. No distress. HENT:  
Head: Normocephalic. Eyes: Pupils are equal, round, and reactive to light. Neck: Normal range of motion. Neck supple. JVD present. Cardiovascular: Normal rate, regular rhythm, normal heart sounds and intact distal pulses. No murmur heard. Pulmonary/Chest: Effort normal and breath sounds normal. No respiratory distress. Abdominal: Soft. Bowel sounds are normal. He exhibits no distension. Musculoskeletal: Normal range of motion. He exhibits no edema. Neurological: He is alert and oriented to person, place, and time. Skin: Skin is warm and dry. Nursing note and vitals reviewed. History: 
Past Medical History:  
Diagnosis Date  Degenerative disc disease, lumbar  Heart failure ([facility] [address]  High cholesterol  Hypertension  Paroxysmal atrial fibrillation ([facility] [address] 4/2/2019  Spinal stenosis Past Surgical History:  
Procedure Laterality Date  HX APPENDECTOMY  HX CORONARY ARTERY BYPASS GRAFT    
 triple  HX HERNIA REPAIR    
 HX IMPLANTABLE CARDIOVERTER DEFIBRILLATOR  MN CARDIOVERSION ELECTIVE ARRHYTHMIA EXTERNAL N/A 6/10/2019 EP CARDIOVERSION performed by Umang Tierney MD at [place] [address], [facility] CATH LAB  MN CARDIOVERSION ELECTIVE ARRHYTHMIA EXTERNAL N/A 6/18/2019 EP CARDIOVERSION performed by Martha Goss MD at [place] [address], [facility] CATH LAB  MN INSJ/RPLCMT PERM DFB W/TRNSVNS LDS 1/DUAL CHMBR N/A 6/21/2019 INSERT ICD BIV MULTI performed by Ron Peralta MD at [place] [address], [facility] CATH LAB  MN TCAT IMPL WRLS P-ART PRS SNR L-T HEMODYN MNTR N/A 9/18/2019 IMPLANT HEART FAILURE MONITORING DEVICE performed by Shabbir Shah MD at [place] [address], [facility] CATH LAB Social History Socioeconomic History  Marital status:  Spouse name: Not on file  Number of children: Not on file  Years of education: Not on file  Highest education level: Not on file Occupational History  Not on file Social Needs  Financial resource strain: Not on file  Food insecurity: Worry: Not on file Inability: Not on file  Transportation needs:  
  Medical: Not on file Non-medical: Not on file Tobacco Use  Smoking status: Former Smoker Last attempt to quit: 2010 Years since quittin.9  Smokeless tobacco: Never Used Substance and Sexual Activity  Alcohol use: Not Currently Comment: rarely  Drug use: Never  Sexual activity: Not on file Lifestyle  Physical activity:  
  Days per week: Not on file Minutes per session: Not on file  Stress: Not on file Relationships  Social connections:  
  Talks on phone: Not on file Gets together: Not on file Attends Latter day service: Not on file Active member of club or organization: Not on file Attends meetings of clubs or organizations: Not on file Relationship status: Not on file  Intimate partner violence:  
  Fear of current or ex partner: Not on file Emotionally abused: Not on file Physically abused: Not on file Forced sexual activity: Not on file Other Topics Concern [address] Service Not Asked  Blood Transfusions Not Asked  Caffeine Concern Not Asked  Occupational Exposure Not Asked [place] Hazards Not Asked  Sleep Concern Not Asked  Stress Concern Not Asked  Weight Concern Not Asked  Special Diet Not Asked  Back Care Not Asked  Exercise Not Asked  Bike Helmet Not Asked  Seat Belt Not Asked  Self-Exams Not Asked Social History Narrative  Not on file Family History Problem Relation Age of Onset  Lung Disease Mother  Hypertension Mother [address] [facility] [address] Arthritis-osteo Mother  Heart Disease Mother  Heart Disease Father  Diabetes Father Current Medications:  
Current Facility-Administered Medications Medication Dose Route Frequency Provider Last Rate Last Dose  epoetin yvonne-epbx (RETACRIT) injection 10,000 Units  10,000 Units SubCUTAneous Q TUE, THU & SAT He Mensah MD      
  [START ON 11/1/2019] pantoprazole (PROTONIX) tablet 40 mg  40 mg Oral ACB Sumit Bernstein, TIERRA      
 metoclopramide HCl (REGLAN) injection 5 mg  5 mg IntraVENous Q6H Nancy Ped D, NP   5 mg at 10/31/19 1235  dextrose 5% infusion  4-20 mL/hr IntraVENous CONTINUOUS Nancy Ped D, NP 17.1 mL/hr at 10/31/19 1002 17.1 mL/hr at 10/31/19 1002  bivalirudin (ANGIOMAX) 250 mg in dextrose 5% 250 mL infusion  4-20 mL/hr Other TITRATE Sumit Bernstein NP   Stopped at 10/31/19 1002  alteplase (CATHFLO) 1 mg in sterile water (preservative free) 1 mL injection  1 mg InterCATHeter PRN Sumit Bernstein NP      
 bacitracin 500 unit/gram packet 1 Packet  1 Packet Topical PRN Sumit Bernstein NP      
 sodium chloride (NS) flush 5-40 mL  5-40 mL IntraVENous Q8H Nancy Ped D, NP   10 mL at 10/31/19 0501  
 sodium chloride (NS) flush 5-40 mL  5-40 mL IntraVENous PRN Sumit Bernstein, NP      
 0.45% sodium chloride infusion  10 mL/hr IntraVENous CONTINUOUS Nancy Ped D, NP 10 mL/hr at 10/29/19 1321 10 mL/hr at 10/29/19 1321  
 0.9% sodium chloride infusion  10 mL/hr IntraVENous CONTINUOUS Levi, Shana B,  mL/hr at 10/30/19 1000 100 mL/hr at 10/30/19 1000  
 oxyCODONE IR (ROXICODONE) tablet 5 mg  5 mg Oral Q4H PRN Nancy Ped D, NP   5 mg at 10/31/19 0127  
 oxyCODONE IR (ROXICODONE) tablet 10 mg  10 mg Oral Q4H PRN Sumit Bernstein NP      
 morphine injection 4 mg  4 mg IntraVENous Q2H PRN Sumit Bernstein NP      
 naloxone [facility]) injection 0.4 mg  0.4 mg IntraVENous PRN Sumit Bernstein NP      
 mupirocin (BACTROBAN) 2 % ointment   Both Nostrils BID Nancy Ped D, NP      
 ondansetron [facility]) injection 4 mg  4 mg IntraVENous Q4H PRN Nancy Devyn D, NP   4 mg at 10/31/19 6880  
 albuterol (PROVENTIL VENTOLIN) nebulizer solution 2.5 mg  2.5 mg Nebulization Q4H PRN Sumit Bernstein, TIERRA      
  chlorhexidine (PERIDEX) 0.12 % mouthwash 10 mL  10 mL Oral Q12H Sol Ham D, NP   10 mL at 10/30/19 0900  
 magnesium oxide (MAG-OX) tablet 400 mg  400 mg Oral BID Sol Ham D, NP   400 mg at 10/31/19 5970  calcium chloride 1 g in 0.9% sodium chloride 250 mL IVPB  1 g IntraVENous PRN Aura Ramirez NP      
 bisacodyl (DULCOLAX) suppository 10 mg  10 mg Rectal DAILY PRN Aura Ramirez NP      
 senna-docusate (PERICOLACE) 8.6-50 mg per tablet 1 Tab  1 Tab Oral BID Aura Ramirez NP   1 Tab at 10/31/19 1533  polyethylene glycol (MIRALAX) packet 17 g  17 g Oral DAILY Sol Ham D, NP   17 g at 10/31/19 6213  ELECTROLYTE REPLACEMENT NOTE: Nurse to review Serum Potassium and Magnesuim levels and Initiate Electrolyte Replacement Protocol as needed  1 Each Other PRN Aura Ramirez NP      
 magnesium sulfate 1 g/100 ml IVPB (premix or compounded)  1 g IntraVENous PRN Aura Ramirez NP      
 glucose chewable tablet 16 g  4 Tab Oral PRN Aura Ramirez NP      
 glucagon [facility]) injection 1 mg  1 mg IntraMUSCular PRN Aura Ramirez NP      
 dextrose 10% infusion 0-250 mL  0-250 mL IntraVENous PRN Aura Ramirez NP      
 morphine injection 2 mg  2 mg IntraVENous Q2H PRN Aura Ramirez NP      
 melatonin tablet 3 mg  3 mg Oral QHS PRN Aura Ramirez NP   3 mg at 10/30/19 2056  ceFAZolin (ANCEF) 2 g/20 mL in sterile water IV syringe  2 g IntraVENous Q8H Sol Ham D, NP   2 g at 10/31/19 1130  
 albumin human 5% (BUMINATE) solution 25 g  25 g IntraVENous Q2H PRN Lisa Damon MD   25 g at 10/30/19 9604  insulin lispro (HUMALOG) injection   SubCUTAneous Q6H Lisa Damon MD   2 Units at 10/31/19 1241  [Held by provider] spironolactone (ALDACTONE) tablet 25 mg  25 mg Oral DAILY Sol Ham D, NP   Stopped at 10/29/19 0900  
 milrinone (PRIMACOR) 20 MG/100 ML D5W infusion  0.3 mcg/kg/min IntraVENous CONTINUOUS LeviShana metzger, NP 7.6 mL/hr at 10/31/19 1005 0.3 mcg/kg/min at 10/31/19 1005  amiodarone (CORDARONE) tablet 100 mg  100 mg Oral BID Senait Glance D, NP   100 mg at 10/31/19 9906  
 influenza vaccine 2019-20 (6 mos+)(PF) (FLUARIX/FLULAVAL/FLUZONE QUAD) injection 0.5 mL  0.5 mL IntraMUSCular PRIOR TO DISCHARGE Manju Milian NP      
 [Held by provider] aspirin delayed-release tablet 81 mg  81 mg Oral DAILY Senait Glance D, NP   81 mg at 10/31/19 7883  tamsulosin (FLOMAX) capsule 0.8 mg  0.8 mg Oral DAILY Senait Glance D, NP   0.8 mg at 10/31/19 5280  allopurinol (ZYLOPRIM) tablet 100 mg  100 mg Oral DAILY Senait Glance D, NP   100 mg at 10/31/19 3856 Allergies: No Known Allergies Recent Labs:  
Labs Latest Ref Rng & Units 10/31/2019 10/31/2019 10/30/2019 10/29/2019 10/28/2019 10/28/2019 10/27/2019 WBC 4.1 - 11.1 K/uL - 4.1 4. 0(L) 4.4 - 5.0 4.1  
RBC 4.10 - 5.70 M/uL - 3.11(L) 3.62(L) 3.42(L) - 3.48(L) 3.48(L) Hemoglobin 12.1 - 17.0 g/dL 9.4(L) 8. 9(L) 10. 6(L) 9.8(L) - 10. 0(L) 10. 0(L) Hematocrit 36.6 - 50.3 % 29. 5(L) 28. 8(L) 33. 0(L) 31. 3(L) - 31. 9(L) 31. 7(L) MCV 80.0 - 99.0 FL - 92.6 91.2 91.5 - 91.7 91.1 Platelets 209 - 883 K/uL - 91(L) 140(L) 152 - 88(L) 106(L) Lymphocytes 12 - 49 % - - - - - - - Monocytes 5 - 13 % - - - - - - - Eosinophils 0 - 7 % - - - - - - - Basophils 0 - 1 % - - - - - - - Albumin 3.5 - 5.0 g/dL - 2. 9(L) 3. 2(L) 2. 4(L) - 2. 4(L) 2. 4(L) Calcium 8.5 - 10.1 MG/DL - 8.4(L) 9.4 8.9 - 8.8 7. 9(L) SGOT 15 - 37 U/L - 34 52(H) 48(H) - 49(H) 58(H) Glucose 65 - 100 mg/dL - 97 108(H) 96 - 110(H) 102(H) BUN 6 - 20 MG/DL - 27(H) 28(H) 24(H) - 20 23(H) Creatinine 0.70 - 1.30 MG/DL - 1.57(H) 1.88(H) 1.59(H) - 1.59(H) 1.61(H) Sodium 136 - 145 mmol/L - 130(L) 132(L) 130(L) - 132(L) 132(L) Potassium 3.5 - 5.1 mmol/L - 3.8 3. 2(L) 4.1 4.8 4.7 3. 2(L) TSH 0.36 - 3.74 uIU/mL - - - - - - -  
 PSA 0.01 - 4.0 ng/mL - - - - - - -  
LDH 85 - 241 U/L - 312(H) 352(H) - - - - Some recent data might be hidden Lab Results Component Value Date/Time WBC 4.1 10/31/2019 05:10 AM  
 HGB 9.4 (L) 10/31/2019 10:59 AM  
 HCT 29.5 (L) 10/31/2019 10:59 AM  
 PLATELET 91 (L) 91/43/3870 05:10 AM  
 MCV 92.6 10/31/2019 05:10 AM  
 
Lab Results Component Value Date/Time GFR est non-AA 44 (L) 10/31/2019 05:10 AM  
 GFR est AA 53 (L) 10/31/2019 05:10 AM  
 Creatinine 1.57 (H) 10/31/2019 05:10 AM  
 BUN 27 (H) 10/31/2019 05:10 AM  
 Sodium 130 (L) 10/31/2019 05:10 AM  
 Potassium 3.8 10/31/2019 05:10 AM  
 Chloride 94 (L) 10/31/2019 05:10 AM  
 CO2 32 10/31/2019 05:10 AM  
 Magnesium 2.1 10/31/2019 05:10 AM  
 Phosphorus 2.4 (L) 06/04/2019 04:09 AM  
 
Lab Results Component Value Date/Time TSH 2.40 10/25/2019 07:39 PM  
 T4, Free 1.7 (H) 10/25/2019 07:39 PM  
  
Lab Results Component Value Date/Time Sodium 130 (L) 10/31/2019 05:10 AM  
 Potassium 3.8 10/31/2019 05:10 AM  
 Chloride 94 (L) 10/31/2019 05:10 AM  
 CO2 32 10/31/2019 05:10 AM  
 Anion gap 4 (L) 10/31/2019 05:10 AM  
 Glucose 97 10/31/2019 05:10 AM  
 BUN 27 (H) 10/31/2019 05:10 AM  
 Creatinine 1.57 (H) 10/31/2019 05:10 AM  
 BUN/Creatinine ratio 17 10/31/2019 05:10 AM  
 GFR est AA 53 (L) 10/31/2019 05:10 AM  
 GFR est non-AA 44 (L) 10/31/2019 05:10 AM  
 Calcium 8.4 (L) 10/31/2019 05:10 AM  
 Bilirubin, total 2.4 (H) 10/31/2019 05:10 AM  
 ALT (SGPT) 12 10/31/2019 05:10 AM  
 AST (SGOT) 34 10/31/2019 05:10 AM  
 Alk. phosphatase 98 10/31/2019 05:10 AM  
 Protein, total 5.9 (L) 10/31/2019 05:10 AM  
 Albumin 2.9 (L) 10/31/2019 05:10 AM  
 Globulin 3.0 10/31/2019 05:10 AM  
 A-G Ratio 1.0 (L) 10/31/2019 05:10 AM  
  
Lab Results Component Value Date/Time Hemoglobin A1c 6.6 (H) 10/25/2019 02:56 PM  
  
 
 
Thank you for letting us see him with you, TIERRA [address], [address] Office [phone] Fax [phone] F ATTENDING ADDENDUM Patient was seen and examined in person. Data and notes were reviewed. I have discussed and agree with the plan as noted in the NP note above without further additions. Chato Ogden MD PhD 
[phone] [facility] Total Critical Care time spent: 0810-6106 
30 minutes. There was no overlap with other services Critical care was necessary to treat or prevent imminent or life threatening deterioration of the following conditions: cardiac failure, respiratory failure and CNS failure or compromise 
  
Services Provided: 1. Telemetry review and 12 lead ECG interpretation 2. Hemodynamic interpretation, assessment, and management 3. Review and interpretation of CXR 4. Review and interpretation of lab values 5. Review and interpretation of microbiologic data and culture results 6. Review of medications and administration 7. Review and interpretation of nutrition requirements and management 8. Discussion of management withother consultants and services 9.  Clinical update to family members

## 2019-10-31 NOTE — PROGRESS NOTES
[facility] ICU Progress Note Admit Date: 10/25/2019 POD:  2 Day Post-Op Procedure:  Procedure(s): RIGHT AXILLARY IMPELLA INSERTION Subjective:  
Pt seen with Dr. Ross Claude. On milrinone 0.4 and dobutamine 5. On NC 2L. Sitting up in chair. Thomas red urine. Vomiting x 2 since yesterday Objective:  
Vitals: 
Blood pressure 110/55, pulse 92, temperature 98.4 °F (36.9 °C), resp. rate 18, height 6' 2\" (1.88 m), weight 177 lb 0.5 oz (80.3 kg), SpO2 99 %. Temp (24hrs), Av.6 °F (36.4 °C), Min:96.9 °F (36.1 °C), Max:98.4 °F (36.9 °C) Hemodynamics: 
 CO: CO (l/min): 6.5 l/min CI: CI (l/min/m2): 2.2 l/min/m2 CVP: CVP (mmHg): 5 mmHg (10/31/19 0800) SVR: SVR (dyne*sec)/cm5: 597 (dyne*sec)/cm5 (10/30/19 2000) PAP Systolic: PAP Systolic: 47 (30/57 3395) PAP Diastolic: PAP Diastolic: 22 (/51 3314) PVR:   
 SV02: SVO2 (%): 73 % (10/31/19 0800) SCV02: SCVO2 (%): 75 % (10/29/19 1900) EKG/Rhythm:   
NSR Oxygen Therapy: 
Oxygen Therapy O2 Sat (%): 99 % (10/31/19 0800) Pulse via Oximetry: 93 beats per minute (10/31/19 0800) O2 Device: Nasal cannula (10/31/19 0800) O2 Flow Rate (L/min): 2 l/min (10/31/19 0800) FIO2 (%): 40 % (10/30/19 08) CXR:  
CXR Results  (Last 48 hours) 10/31/19 0509  XR CHEST PORT Final result Impression:  IMPRESSION: Arrey-Russ catheter tip has been advanced into the right interlobar  
pulmonary artery. There is slight improvement in the interstitial edema pattern. Narrative:  EXAM:  XR CHEST PORT INDICATION:  postop heart COMPARISON:  10/30/2019 FINDINGS: A portable AP radiograph of the chest was obtained at 428 hours. Left  
ventricular assist device is unchanged. AICD is unchanged. Arrey-Russ catheter  
has been advanced into the right interlobar pulmonary artery. PICC catheter is  
unchanged. .  There is slight improvement in the interstitial edema pattern. Elza Cornelius Cardiomegaly is stable. . . 10/30/19 0438  XR CHEST PORT Final result Impression:  IMPRESSION:  
   
Decrease in now mild interstitial pulmonary edema. Decreased small right and  
trace left pleural effusions. No pneumothorax. Narrative:  EXAM: XR CHEST PORT INDICATION: Heart surgery for acute decompensated heart failure, atrial  
fibrillation, chronic kidney disease, lymphadenopathy. Respiratory distress,  
intubation. COMPARISON: Portable chest on 10/29/2019. TECHNIQUE: Semiupright portable chest AP view FINDINGS: Endotracheal tube terminates in good position in the mid trachea. [place]-Russ catheter extends into the expected location of the right main  
pulmonary artery. Defibrillator battery pack and leads are unchanged. Cardiac  
assist device is unchanged. Right PICC line is unchanged. Cardiac monitoring  
wires overlie the thorax. Cardiomegaly is unchanged. Mediastinal clips are  
unchanged. Aortic arch is not enlarged. Mild pulmonary vascular prominence is  
decreased. Interstitial pulmonary edema is decreased and mild. Small right pleural effusion  
is decreased. Trace left pleural effusion is decreased. No pneumothorax. No  
evidence of pneumonia. Osteopenia is unchanged. 10/29/19 1111  XR CHEST PORT Final result Impression:  IMPRESSION:  
1. Interval worsening of the congestive cardiac failure/pulmonary edema. Presence of bilateral pleural effusions. 2. Interval placement of endotracheal tube described above. 3. No change in the position of the [place]-Russ catheter and the PICC line on the  
right. Narrative:  Portable chest single view dated 10/29/2019 at 10:25 AM  
   
Comparison is chest from earlier same day (3:56 AM). History is postop heart. A single portable AP supine view of the chest was obtained. The patient is  
slightly rotated towards right. The convex left hip is enlarged. There is been interval worsening of the congestive change/pulmonary edema. Veiling opacity is  
noted at both lung bases. This is compatible with the presence of bilateral  
pleural effusions. Superimposed parenchymal disease at the lung bases cannot be  
excluded. There has been interval placement of endotracheal tube the tip which  
is situated just below the level of the thoracic inlet. The reginaldo is not well  
visualized. The [place]-Russ catheter and PICC line on the right remain unchanged  
in position. Admission Weight: Last Weight Weight: 192 lb 10.9 oz (87.4 kg) Weight: 177 lb 0.5 oz (80.3 kg) Intake / Output / Drain: 
Current Shift: 10/31 0701 - 10/31 1900 In: 193.1 [I.V.:193.1] Out: 50 [Urine:50] Last 24 hrs.:  
 
Intake/Output Summary (Last 24 hours) at 10/31/2019 1005 Last data filed at 10/31/2019 0800 Gross per 24 hour Intake 4357.7 ml Output 1715 ml Net 2642.7 ml EXAM: 
General:   NAD Lungs:   Clear to auscultation bilaterally. Incision:  No erythema, drainage or swelling. Heart:  Regular rate and rhythm, S1, S2 normal, no murmur, click, rub or gallop. Abdomen:   Soft, non-tender. Bowel sounds hypoactive. No masses,  No organomegaly. Extremities:  No edema. PPP. Neurologic:  Gross motor and sensory apparatus intact. Labs:  
Recent Labs 10/31/19 
0510 10/31/19 
0507 WBC 4.1  --   
HGB 8.9*  --   
HCT 28.8*  --   
PLT 91*  --   
*  --   
K 3.8  --   
BUN 27*  --   
CREA 1.57*  --   
GLU 97  --   
GLUCPOC  --  197* INR 2.3*  --   
 
 
 Assessment:  
 
Active Problems: 
  Acute decompensated heart failure ([facility] [address] (10/25/2019) Plan/Recommendations/Medical Decision Makin. Acute on chronic systolic (CHF Class IV) S/P Impella: Has AICD. LV EF 16-20%. Cont mil and  per AHFS. No BB/ACE/ARB/AA until appropriate. Diuretics on Hold. Trend proBNP, lactate, LDH. Strict I/Os. Daily weights. LVAD w/u in process. On ancef for prophylaxis. Back to d5 for purge d/t hematuria --trend coags daily - elevated today. 2. Thrombocytopenia: Platelets down to 92B. Stop asa. Send HIT panel. Change pepcid to protonix. Changing back to D5 purge d/t hematuria. Monitor 3. CAD S/p CABG 2010: Needs [facility], plans for next week w/ Dr. Krystina Patiño. Stop asa d/t hematuria, not on statin historically. No BB D/t pressors/intropes. 4. PAF s/p DCCV 6/2019: Holding eliquis, changing purge back to D5 d/t hematuria. On PO amio. 5. CKD stage III: Monitor. Renal following. Diuretics PRN. 6. Hx of urinary retention/BPH:  On flomax. Having hematuria. Give VIt K, change purge to D5. Trend coags. Keep neal. Repeat UA. 7. JOSE: qhs CPAP. 8. Hx of sternal wound infection requring sternectomy: Not a contraindication for future LVAD. Tagged WBC pending. 9. Iron def anemia: s/p venofer. 10. Vocal cord paralysis:  Speech eval PRN. Advance diet as tolerated. 11. Constipation: NO BM since 10/25. On pericolace, miralax. Check KUB. May need enema. Dispo: Care and orders per AHFS. Remain in CCU. Update: KUB Ok, needs to have BM. Give supp today, reglan x 4 doses. Monitor.    
 
Signed By: Eron Peres NP

## 2019-10-31 NOTE — PROGRESS NOTES
NYHA class IV A/C systolic heart failure Low EF (10%) Inotrope dependent Malnutrition  
LVAD Work-up Hematuria overnight Will switch Impella to D5 VWF deficiency Hgb dropped a bit Platelets low INR elevated - will give some Vit K Creatinine in the mid 1's Bilirubin elevated, other LFTs coming down Lactic acid and LDH look good Pro-calcitonin a little better NT pro-BNP about the same Impella - flow 4 L/min @ P-6 CXR - mild to moderate pulmonary edema getting better Intake/Output Summary (Last 24 hours) at 10/31/2019 1132 Last data filed at 10/31/2019 1005 Gross per 24 hour Intake 4247.7 ml Output 1715 ml Net 2532.7 ml  
 
Visit Vitals /55 (BP 1 Location: Left arm, BP Patient Position: At rest) Pulse 85 Temp 98.4 °F (36.9 °C) Resp 16 Ht 6' 2\" (1.88 m) Wt 177 lb 0.5 oz (80.3 kg) SpO2 100% BMI 22.73 kg/m² Risk of morbidity and mortality - high Medical decision making - high complexity Total critical care time - 30 minutes (CPT 27959)

## 2019-10-31 NOTE — PROGRESS NOTES
[facility] 
 [address] [facility], [address] [facility], [facility] Phone: [phone]   [phone]   
  
Nephrology Progress Note Sona Kiser     1950     [id number] Date of Admission : 10/25/2019 
10/31/19 CC:  Follow up for JUAN J, CKD, Hyponatremia Assessment and Plan JUAN J/CKD Stage IV : 
- likely from CRS 
- Cr improving 
- stable UOP 
- on 1 inotropes and IVF 
- diuretics on hold 
- daily labs for now CKD IV: 
- L renal atrophy 
- NM scan noted Gross hematuria: 
- likely from angiomax - dose reduced 
- monitor for now Hypokalemia: 
- replete K Hyponatremia: 
- from hypervolemia and excessive fluid intake 
- stable Hoarseness, vocal cord paralysis, mediastinal adenopathy\" - will need eventual PET/CT 
- per pulmonary 
  
BPH w/ urinary retention  
- keep neal for now 
  
Acute on Chronic HFrEF  
- EF 16-20%, NYHA Class IV , hx of VF arrest - s/p AICD 
- Impella insertion 10/29 
  
JOES on CPAP  
  
PAF s/p DCCV 6/19 
- on Eliquis and amiodarone  
  
Chronic Anemia: 
- from CKD and iron deficiency - s/p IV iron 
- start PAVEL Interval History:   
Seen and examined. Intubated and sedated now. BP stable. CVP 6, UOP 5.7 L, off bumex drip and still making about 200cc/hr per RN overnight. Cr up slightly this AM. Review of Systems: Pertinent items are noted in HPI. Current Medications:  
Current Facility-Administered Medications Medication Dose Route Frequency  DOBUTamine (DOBUTREX) 500 mg/250 mL (2,000 mcg/mL) infusion  0-10 mcg/kg/min IntraVENous TITRATE  famotidine (PEPCID) tablet 20 mg  20 mg Oral Q24H  
 dextrose 5% infusion  4-20 mL/hr IntraVENous CONTINUOUS  
 bivalirudin (ANGIOMAX) 250 mg in dextrose 5% 250 mL infusion  4-20 mL/hr Other TITRATE  alteplase (CATHFLO) 1 mg in sterile water (preservative free) 1 mL injection  1 mg InterCATHeter PRN  
 bacitracin 500 unit/gram packet 1 Packet  1 Packet Topical PRN  
  sodium chloride (NS) flush 5-40 mL  5-40 mL IntraVENous Q8H  
 sodium chloride (NS) flush 5-40 mL  5-40 mL IntraVENous PRN  
 0.45% sodium chloride infusion  10 mL/hr IntraVENous CONTINUOUS  
 0.9% sodium chloride infusion  100 mL/hr IntraVENous CONTINUOUS  
 oxyCODONE IR (ROXICODONE) tablet 5 mg  5 mg Oral Q4H PRN  
 oxyCODONE IR (ROXICODONE) tablet 10 mg  10 mg Oral Q4H PRN  
 morphine injection 4 mg  4 mg IntraVENous Q2H PRN  
 naloxone (NARCAN) injection 0.4 mg  0.4 mg IntraVENous PRN  
 mupirocin (BACTROBAN) 2 % ointment   Both Nostrils BID  ondansetron (ZOFRAN) injection 4 mg  4 mg IntraVENous Q4H PRN  
 albuterol (PROVENTIL VENTOLIN) nebulizer solution 2.5 mg  2.5 mg Nebulization Q4H PRN  
 midazolam (VERSED) injection 1 mg  1 mg IntraVENous Q1H PRN  chlorhexidine (PERIDEX) 0.12 % mouthwash 10 mL  10 mL Oral Q12H  
 magnesium oxide (MAG-OX) tablet 400 mg  400 mg Oral BID  calcium chloride 1 g in 0.9% sodium chloride 250 mL IVPB  1 g IntraVENous PRN  
 bisacodyl (DULCOLAX) suppository 10 mg  10 mg Rectal DAILY PRN  
 senna-docusate (PERICOLACE) 8.6-50 mg per tablet 1 Tab  1 Tab Oral BID  polyethylene glycol (MIRALAX) packet 17 g  17 g Oral DAILY  ELECTROLYTE REPLACEMENT NOTE: Nurse to review Serum Potassium and Magnesuim levels and Initiate Electrolyte Replacement Protocol as needed  1 Each Other PRN  
 magnesium sulfate 1 g/100 ml IVPB (premix or compounded)  1 g IntraVENous PRN  
 glucose chewable tablet 16 g  4 Tab Oral PRN  
 glucagon (GLUCAGEN) injection 1 mg  1 mg IntraMUSCular PRN  
 dextrose 10% infusion 0-250 mL  0-250 mL IntraVENous PRN  
 morphine injection 2 mg  2 mg IntraVENous Q2H PRN  
 melatonin tablet 3 mg  3 mg Oral QHS PRN  
 ceFAZolin (ANCEF) 2 g/20 mL in sterile water IV syringe  2 g IntraVENous Q8H  
 albumin human 5% (BUMINATE) solution 25 g  25 g IntraVENous Q2H PRN  
 insulin lispro (HUMALOG) injection   SubCUTAneous Q6H  
  [Held by provider] spironolactone (ALDACTONE) tablet 25 mg  25 mg Oral DAILY  milrinone (PRIMACOR) 20 MG/100 ML D5W infusion  0.4 mcg/kg/min IntraVENous CONTINUOUS  
 amiodarone (CORDARONE) tablet 100 mg  100 mg Oral BID  influenza vaccine 2019-20 (6 mos+)(PF) (FLUARIX/FLULAVAL/FLUZONE QUAD) injection 0.5 mL  0.5 mL IntraMUSCular PRIOR TO DISCHARGE  aspirin delayed-release tablet 81 mg  81 mg Oral DAILY  tamsulosin (FLOMAX) capsule 0.8 mg  0.8 mg Oral DAILY  allopurinol (ZYLOPRIM) tablet 100 mg  100 mg Oral DAILY No Known Allergies Objective: 
Vitals:   
Vitals:  
 10/31/19 0500 10/31/19 0600 10/31/19 0700 10/31/19 0800 BP:      
Pulse: 89 93 95 92 Resp: 26 19 23 18 Temp:    98.4 °F (36.9 °C) SpO2: 94% 96% 99% 99% Weight:      
Height:      
 
Intake and Output: 
10/31 0701 - 10/31 1900 In: 193.1 [I.V.:193.1] Out: 50 [Urine:50] 
10/29 1901 - 10/31 0700 In: 6037.5 [I.V.:6037.5] Out: Braden [OFETV:[id number]] Physical Examination: 
Pt intubated     No 
General: Awake and alert Neck:  Supple, no mass Resp:  Lungs few dependent rales CV:  RRR,  no murmur or rub, trace b/l LE edema GI:  Soft, NT, + Bowel sounds Neurologic:  AOx3, nonfocal exam 
Psych:             Normal mood and affect Skin:  No Rash :  Lizama - gross hematuria [x]    High complexity decision making was performed 
[]    Patient is at high-risk of decompensation with multiple organ involvement Lab Data Personally Reviewed: I have reviewed all the pertinent labs, microbiology data and radiology studies during assessment. Recent Labs 10/31/19 
0510 10/30/19 
0448 10/29/19 
0332 10/28/19 
1249 * 132* 130*  --   
K 3.8 3.2* 4.1 4.8  
CL 94* 91* 93*  --   
CO2 32 33* 31  --   
GLU 97 108* 96  --   
BUN 27* 28* 24*  --   
CREA 1.57* 1.88* 1.59*  --   
CA 8.4* 9.4 8.9  --   
MG 2.1 2.4 2.2  --   
ALB 2.9* 3.2* 2.4*  --   
SGOT 34 52* 48*  --   
ALT 12 41 94*  --   
INR 2.3* 1.3* 1.3*  --   
 
 Recent Labs 10/31/19 
0510 10/30/19 
0448 10/29/19 
6111 WBC 4.1 4.0* 4.4 HGB 8.9* 10.6* 9.8* HCT 28.8* 33.0* 31.3*  
PLT 91* 140* 152 No results found for: SDES Lab Results Component Value Date/Time Culture result: NO GROWTH 5 DAYS 10/25/2019 07:14 PM  
 Culture result: ENTEROBACTER CLOACAE (A) 06/26/2019 12:25 PM  
 Culture result: NO GROWTH 1 DAY 06/13/2019 10:44 AM  
 Culture result: ENTEROBACTER CLOACAE (A) 06/04/2019 04:27 AM  
 Culture result: NO GROWTH 5 DAYS 06/04/2019 04:09 AM  
 
Recent Results (from the past 24 hour(s)) POC G3 - PUL Collection Time: 10/30/19  9:22 AM  
Result Value Ref Range FIO2 (POC) 40 % pH (POC) 7.468 (H) 7.35 - 7.45    
 pCO2 (POC) 46.8 (H) 35.0 - 45.0 MMHG  
 pO2 (POC) 156 (H) 80 - 100 MMHG  
 HCO3 (POC) 33.9 (H) 22 - 26 MMOL/L  
 sO2 (POC) 99 (H) 92 - 97 % Base excess (POC) 10 mmol/L Site DRAWN FROM ARTERIAL LINE Device: VENT Mode CPAP/SPON    
 PEEP/CPAP (POC) 5 cmH2O Pressure support 5 cmH2O Allens test (POC) N/A Specimen type (POC) ARTERIAL Total resp. rate 20 GLUCOSE, POC Collection Time: 10/30/19 12:50 PM  
Result Value Ref Range Glucose (POC) 127 (H) 65 - 100 mg/dL Performed by Isauro Roper GLUCOSE, POC Collection Time: 10/30/19  6:20 PM  
Result Value Ref Range Glucose (POC) 160 (H) 65 - 100 mg/dL Performed by Isauro Roper GLUCOSE, POC Collection Time: 10/30/19 11:03 PM  
Result Value Ref Range Glucose (POC) 113 (H) 65 - 100 mg/dL Performed by zeyad Ney POC VENOUS BLOOD GAS Collection Time: 10/31/19  4:34 AM  
Result Value Ref Range Device: NASAL CANNULA Flow rate (POC) 4 L/M  
 FIO2 (POC) 36 %  
 pH, venous (POC) 7.410 7.32 - 7.42    
 pCO2, venous (POC) 31.4 (L) 41 - 51 MMHG  
 pO2, venous (POC) 35 25 - 40 mmHg HCO3, venous (POC) 19.9 (L) 23.0 - 28.0 MMOL/L  
 sO2, venous (POC) 70 65 - 88 %  Base deficit, venous (POC) 5 mmol/L  
 Allens test (POC) N/A Total resp. rate 22 Site CHITO Montoya Specimen type (POC) MIXED VENOUS    
GLUCOSE, POC Collection Time: 10/31/19  5:07 AM  
Result Value Ref Range Glucose (POC) 197 (H) 65 - 100 mg/dL Performed by Castillo Mejía LD Collection Time: 10/31/19  5:10 AM  
Result Value Ref Range  (H) 85 - 241 U/L  
NT-PRO BNP Collection Time: 10/31/19  5:10 AM  
Result Value Ref Range NT pro-BNP 10,988 (H) <125 PG/ML  
LACTIC ACID Collection Time: 10/31/19  5:10 AM  
Result Value Ref Range Lactic acid 0.9 0.4 - 2.0 MMOL/L  
METABOLIC PANEL, COMPREHENSIVE Collection Time: 10/31/19  5:10 AM  
Result Value Ref Range Sodium 130 (L) 136 - 145 mmol/L Potassium 3.8 3.5 - 5.1 mmol/L Chloride 94 (L) 97 - 108 mmol/L  
 CO2 32 21 - 32 mmol/L Anion gap 4 (L) 5 - 15 mmol/L Glucose 97 65 - 100 mg/dL BUN 27 (H) 6 - 20 MG/DL Creatinine 1.57 (H) 0.70 - 1.30 MG/DL  
 BUN/Creatinine ratio 17 12 - 20 GFR est AA 53 (L) >60 ml/min/1.73m2 GFR est non-AA 44 (L) >60 ml/min/1.73m2 Calcium 8.4 (L) 8.5 - 10.1 MG/DL Bilirubin, total 2.4 (H) 0.2 - 1.0 MG/DL  
 ALT (SGPT) 12 12 - 78 U/L  
 AST (SGOT) 34 15 - 37 U/L Alk. phosphatase 98 45 - 117 U/L Protein, total 5.9 (L) 6.4 - 8.2 g/dL Albumin 2.9 (L) 3.5 - 5.0 g/dL Globulin 3.0 2.0 - 4.0 g/dL A-G Ratio 1.0 (L) 1.1 - 2.2 MAGNESIUM Collection Time: 10/31/19  5:10 AM  
Result Value Ref Range Magnesium 2.1 1.6 - 2.4 mg/dL CBC W/O DIFF Collection Time: 10/31/19  5:10 AM  
Result Value Ref Range WBC 4.1 4.1 - 11.1 K/uL  
 RBC 3.11 (L) 4.10 - 5.70 M/uL HGB 8.9 (L) 12.1 - 17.0 g/dL HCT 28.8 (L) 36.6 - 50.3 % MCV 92.6 80.0 - 99.0 FL  
 MCH 28.6 26.0 - 34.0 PG  
 MCHC 30.9 30.0 - 36.5 g/dL  
 RDW 17.4 (H) 11.5 - 14.5 % PLATELET 91 (L) 805 - 400 K/uL MPV 9.7 8.9 - 12.9 FL  
 NRBC 0.0 0  WBC ABSOLUTE NRBC 0.00 0.00 - 0.01 K/uL PROCALCITONIN  
 Collection Time: 10/31/19  5:10 AM  
Result Value Ref Range Procalcitonin 1.5 ng/mL PTT Collection Time: 10/31/19  5:10 AM  
Result Value Ref Range aPTT 84.0 (H) 22.1 - 32.0 sec  
 aPTT, therapeutic range     58.0 - 77.0 SECS  
PROTHROMBIN TIME + INR Collection Time: 10/31/19  5:10 AM  
Result Value Ref Range INR 2.3 (H) 0.9 - 1.1 Prothrombin time 21.9 (H) 9.0 - 11.1 sec Total time spent with patient:  xxx   min. Care Plan discussed with: 
Patient Family RN Consulting Physician [id number] [facility],      
 
I have reviewed the flowsheets. Chart and Pertinent Notes have been reviewed. No change in PMH ,family and social history from Consult note.  
 
 
Terra Jara MD

## 2019-10-31 NOTE — PROGRESS NOTES
0730 sbar received from cat rn 
 
0815 Dr. Nancy Chin at bedside. Switch bival to D5.  
 
1005 milrinone gtt titrated to 0.3 and dobutamine turned off 
 
1200 Heart failure at bedside. Orders to turn milrinone up to 0.4. Index at 1.3 
 
1400 Echo tech at bedside. 1500 milrinone gtt titrated down to 0.3. Per Reid Pavon NP. Maintain Ci above 2. If not able to then up milirinone to 0.4 
 
1505 PT working with pt 
 
1600 pt in chair 
 
1900 Dora at 58 compared to 63 from 1600 reassessment. Chest xray ordered to confirm placement.  
 
1930 sbar given to Aneta Johnson

## 2019-10-31 NOTE — PROGRESS NOTES
Problem: Mobility Impaired (Adult and Pediatric) Goal: *Acute Goals and Plan of Care (Insert Text) Description FUNCTIONAL STATUS PRIOR TO ADMISSION: Patient was modified independent using a single point cane for functional mobility. Patient reports an increasingly sedentary lifestyle 2* fatigue and SOB/dyspnea. Retired (so is his wife). Patient reports x 3 falls within the last couple of weeks. Patient is wearing either nasal cannula or CPAP at night. LVAD work-up has been initiated. HOME SUPPORT PRIOR TO ADMISSION: The patient lived with his wife, but did not require physical assistance. Physical Therapy Goals Initiated 10/27/2019- remain appropriate on re-evaluation s/p impella insertion 10/31/2019 1. Patient will move from supine to sit and sit to supine, scoot up and down and roll side to side in bed with independence within 7 days. 2.  Patient will perform sit to/from stand with supervision/set-up within 7 days. 3.  Patient will ambulate 150 feet with least restrictive assistive device and supervision/set-up within 7 days. 4.  Patient will ascend/descend 4 stairs with  handrail(s) with supervision/set-up within 7 days for functional strengthening and community reintegration. Malena Rm 5.  Patient will verbally and functionally recall 3/3 sternal precautions within 7 days in preparation for LVAD implantation. 6.  Patient will perform a mock power exchange for power module to/from battery with supervision/set-up within 7 days in preparation for LVAD implantation. Outcome: Progressing Towards Goal 
 PHYSICAL THERAPY REEVALUATION Patient: Ciro Bradford (75 y.o. male) Date: 10/31/2019 Primary Diagnosis: Acute decompensated heart failure ([facility] [address] [I50.9] Procedure(s) (LRB): 
RIGHT AXILLARY IMPELLA INSERTION (Right) 2 Days Post-Op Precautions:   Fall(impella R axillary ) ASSESSMENT Based on the objective data described below, the patient presents with generalized weakness, impaired balance, increased fall risk, new precautions for R UE (no flex/abd >90 degrees) s/p impella insertion POD 2. Patient with swan gem catheter therefore could only do bed>chair transfers. Unsteady but using chair arm steady himself with stand pivot. Admitted to feeling quite weak, and remarked he now needs assistance to get OOB. Patient very motivated and asking to perform 10 min on LE ergometer. VSS throughout, impella on P8. Assisted with transfer to [facility] and left with call bell. Current Level of Function Impacting Discharge (mobility/balance): CGA-min A Functional Outcome Measure: The patient scored 3/28 on the Tinetti outcome measure which is indicative of high fall risk. Unable to assess gait due to lines. Other factors to consider for discharge: medical course, LVAD work up, supportive wife and family Patient will benefit from skilled therapy intervention to address the above noted impairments. PLAN : 
Recommendations and Planned Interventions: bed mobility training, transfer training, gait training, therapeutic exercises, neuromuscular re-education, edema management/control and patient and family training/education Frequency/Duration: Patient will be followed by physical therapy:  5 times a week to address goals. Recommendation for discharge: (in order for the patient to meet his/her long term goals) To be determined: based on medical course and LVAD workup This discharge recommendation: 
Has not yet been discussed the attending provider and/or case management Equipment recommendations for successful discharge (if) home: TBD SUBJECTIVE:  
Patient stated I need to do that bike.  OBJECTIVE DATA SUMMARY:  
HISTORY:   
Past Medical History:  
Diagnosis Date Degenerative disc disease, lumbar Heart failure ([facility] [address] High cholesterol Hypertension Paroxysmal atrial fibrillation ([facility] [address] 4/2/2019 Spinal stenosis Past Surgical History:  
Procedure Laterality Date HX APPENDECTOMY HX CORONARY ARTERY BYPASS GRAFT    
 triple HX HERNIA REPAIR    
 HX IMPLANTABLE CARDIOVERTER DEFIBRILLATOR    
 CO CARDIOVERSION ELECTIVE ARRHYTHMIA EXTERNAL N/A 6/10/2019 EP CARDIOVERSION performed by Aury David MD at [address] [facility] CATH LAB  
 CO CARDIOVERSION ELECTIVE ARRHYTHMIA EXTERNAL N/A 6/18/2019 EP CARDIOVERSION performed by Ina Luque MD at [address], [facility] [address] CATH LAB  
 CO INSJ/RPLCMT PERM DFB W/TRNSVNS LDS 1/DUAL [facility] N/A 6/21/2019 INSERT ICD BIV MULTI performed by Xenia Pat [facility] [address] [facility] [address] [address] CATH LAB  
 CO TCAT IMPL WRLS P-ART PRS SNR L-T HEMODYN MNTR N/A 9/18/2019 IMPLANT HEART FAILURE MONITORING DEVICE performed by Miles Garza MD at [address], [facility] [address] CATH LAB Hospital course since last seen and reason for reevaluation: brannon 10/29 Personal factors and/or comorbidities impacting plan of care: [facility] Home Situation Home Environment: Private residence # Steps to Enter: 0 One/Two Story Residence: One story Living Alone: No 
Support Systems: Spouse/Significant Other/Partner Patient Expects to be Discharged to[de-identified] Unknown Current DME Used/Available at Home: Cane, straight, Walker, rolling, CPAP Tub or Shower Type: Shower EXAMINATION/PRESENTATION/DECISION MAKING:  
Critical Behavior: 
Neurologic State: Alert, Appropriate for age Orientation Level: Oriented X4 Cognition: Follows commands, Appropriate decision making, Appropriate for age attention/concentration, Appropriate safety awareness Safety/Judgement: Awareness of environment, Insight into deficits Hearing: Auditory Auditory Impairment: None Edema: present B LEs, no pitting Range Of Motion: 
Generally decreased, functional 
Strength:   
Generally decreased, functional 
Tone & Sensation:  
neuropathy Functional Mobility: 
Bed Mobility: 
Supine to Sit: Minimum assistance Scooting: Contact guard assistance Transfers: Sit to Stand: Contact guard assistance Stand to Sit: Contact guard assistance Bed to Chair: Contact guard assistance Balance:  
Sitting: Intact Standing: Impaired; Without support Standing - Static: Fair Standing - Dynamic : Fair Therapeutic Exercises:  
Seated bike x10 mins Functional Measure: 
Tinetti test: 
 
Sitting Balance: 1 Arises: 1 Attempts to Rise: 1 Immediate Standing Balance: 0 Standing Balance: 0 Nudged: 0 Eyes Closed: 0 Turn 360 Degrees - Continuous/Discontinuous: 0 Turn 360 Degrees - Steady/Unsteady: 0 Sitting Down: 0 Balance Score: 3 Balance total score Indication of Gait: 0 
R Step Length/Height: 0 
L Step Length/Height: 0 
R Foot Clearance: 0 
L Foot Clearance: 0 Step Symmetry: 0 Step Continuity: 0 Path: 0 Trunk: 0 Walking Time: 0 Gait Score: 0 Gait total score Total Score: 3/28 Overall total score Tinetti Tool Score Risk of Falls 
<19 = High Fall Risk 19-24 = Moderate Fall Risk 25-28 = Low Fall Risk Tinetti ME. Performance-Oriented Assessment of Mobility Problems in Elderly Patients. [address] 66; [id number]. (Scoring Description: PT Bulletin Feb. 10, 1993) Older adults: Claudia Vazquez et al, 2009; n = [address] [email or web address] elderly evaluated with ABC, XAVIER, ADL, and IADL) · Mean XAVIER score for males aged 69-68 years = 26.21(3.40) · Mean XAVIER score for females age 69-68 years = 25.16(4.30) · Mean XAVIER score for males over 80 years = 23.29(6.02) · Mean XAVIER score for females over 80 years = 17.20(8.32) Pain Rating: 
Denied pain Activity Tolerance:  
Fair and SpO2 stable on RA Please refer to the flowsheet for vital signs taken during this treatment. After treatment patient left in no apparent distress:  
Sitting in chair and Call bell within reach COMMUNICATION/EDUCATION:  
The patients plan of care was discussed with: Registered Nurse.  
 
Fall prevention education was provided and the patient/caregiver indicated understanding., Patient/family have participated as able in goal setting and plan of care. and Patient/family agree to work toward stated goals and plan of care. Thank you for this referral. 
Dano Garvey, PT, DPT Time Calculation: 23 mins

## 2019-10-31 NOTE — PROGRESS NOTES
LVAD caregiver contract left with Kristina Carlyn to read/review. Will follow up with her tomorrow regarding Sona's plan of care. Of note, notified that [facility] cannot accept the patient for home health services upon d/c. Will ask VAD RN to call [facility] to inquire if they can assist/ set up LVAD training. Moise Sanderson, MSW, LCSW Clinical  [phone]

## 2019-10-31 NOTE — PROGRESS NOTES
Problem: Falls - Risk of 
Goal: *Absence of Falls Description Document Ariel Fontanez Fall Risk and appropriate interventions in the flowsheet. Outcome: Progressing Towards Goal 
Note:  
Fall Risk Interventions: 
Mobility Interventions: Communicate number of staff needed for ambulation/transfer Mentation Interventions: Adequate sleep, hydration, pain control Medication Interventions: Evaluate medications/consider consulting pharmacy Elimination Interventions: Call light in reach History of Falls Interventions: Door open when patient unattended Problem: Patient Education: Go to Patient Education Activity Goal: Patient/Family Education Outcome: Progressing Towards Goal 
  
Problem: Diabetes Self-Management Goal: *Disease process and treatment process Description Define diabetes and identify own type of diabetes; list 3 options for treating diabetes. Outcome: Progressing Towards Goal 
Goal: *Incorporating nutritional management into lifestyle Description Describe effect of type, amount and timing of food on blood glucose; list 3 methods for planning meals. Outcome: Progressing Towards Goal 
Goal: *Incorporating physical activity into lifestyle Description State effect of exercise on blood glucose levels. Outcome: Progressing Towards Goal 
Goal: *Developing strategies to promote health/change behavior Description Define the ABC's of diabetes; identify appropriate screenings, schedule and personal plan for screenings. Outcome: Progressing Towards Goal 
Goal: *Using medications safely Description State effect of diabetes medications on diabetes; name diabetes medication taking, action and side effects. Outcome: Progressing Towards Goal 
Goal: *Monitoring blood glucose, interpreting and using results Description Identify recommended blood glucose targets  and personal targets. Outcome: Progressing Towards Goal 
Goal: *Prevention, detection, treatment of acute complications Description List symptoms of hyper- and hypoglycemia; describe how to treat low blood sugar and actions for lowering  high blood glucose level. Outcome: Progressing Towards Goal 
Goal: *Prevention, detection and treatment of chronic complications Description Define the natural course of diabetes and describe the relationship of blood glucose levels to long term complications of diabetes. Outcome: Progressing Towards Goal 
Goal: *Developing strategies to address psychosocial issues Description Describe feelings about living with diabetes; identify support needed and support network Outcome: Progressing Towards Goal 
Goal: *Insulin pump training Outcome: Progressing Towards Goal 
Goal: *Sick day guidelines Outcome: Progressing Towards Goal 
Goal: *Patient Specific Goal (EDIT GOAL, INSERT TEXT) Outcome: Progressing Towards Goal 
  
Problem: Patient Education: Go to Patient Education Activity Goal: Patient/Family Education Outcome: Progressing Towards Goal 
  
Problem: Pain Goal: *Control of Pain Outcome: Progressing Towards Goal 
Goal: *PALLIATIVE CARE:  Alleviation of Pain Outcome: Progressing Towards Goal 
  
Problem: Patient Education: Go to Patient Education Activity Goal: Patient/Family Education Outcome: Progressing Towards Goal 
  
Problem: Pressure Injury - Risk of 
Goal: *Prevention of pressure injury Description Document Mich Scale and appropriate interventions in the flowsheet. Outcome: Progressing Towards Goal 
Note:  
Pressure Injury Interventions: 
Sensory Interventions: Float heels, Keep linens dry and wrinkle-free, Maintain/enhance activity level, Minimize linen layers Moisture Interventions: Assess need for specialty bed, Minimize layers, Limit adult briefs, Maintain skin hydration (lotion/cream) Activity Interventions: Pressure redistribution bed/mattress(bed type) Mobility Interventions: Pressure redistribution bed/mattress (bed type) Nutrition Interventions: Document food/fluid/supplement intake Friction and Shear Interventions: Lift sheet, HOB 30 degrees or less, Foam dressings/transparent film/skin sealants Problem: Patient Education: Go to Patient Education Activity Goal: Patient/Family Education Outcome: Progressing Towards Goal 
  
Problem: Infection - Risk of, Multi-drug Resistant Organism Colonization (MDRO) Goal: *Absence of MDRO colonization Outcome: Progressing Towards Goal 
Goal: *Absence of infection signs and symptoms Outcome: Progressing Towards Goal 
  
Problem: Patient Education: Go to Patient Education Activity Goal: Patient/Family Education Outcome: Progressing Towards Goal 
  
Problem: Patient Education: Go to Patient Education Activity Goal: Patient/Family Education Outcome: Progressing Towards Goal 
  
Problem: Heart Failure: Day 5 Goal: Off Pathway (Use only if patient is Off Pathway) Outcome: Progressing Towards Goal 
Goal: Activity/Safety Outcome: Progressing Towards Goal 
Goal: Diagnostic Test/Procedures Outcome: Progressing Towards Goal 
Goal: Nutrition/Diet Outcome: Progressing Towards Goal 
Goal: Discharge Planning Outcome: Progressing Towards Goal 
Goal: Medications Outcome: Progressing Towards Goal 
Goal: Respiratory Outcome: Progressing Towards Goal 
Goal: Treatments/Interventions/Procedures Outcome: Progressing Towards Goal 
Goal: Psychosocial 
Outcome: Progressing Towards Goal 
  
Problem: Heart Failure: Discharge Outcomes Goal: *Demonstrates ability to perform prescribed activity without shortness of breath or discomfort Outcome: Progressing Towards Goal 
Goal: *Left ventricular function assessment completed prior to or during stay, or planned for post-discharge Outcome: Progressing Towards Goal 
Goal: *ACEI prescribed if LVEF less than 40% and no contraindications or ARB prescribed Outcome: Progressing Towards Goal 
 Goal: *Verbalizes understanding and describes prescribed diet Outcome: Progressing Towards Goal 
Goal: *Verbalizes understanding/describes prescribed medications Outcome: Progressing Towards Goal 
Goal: *Describes available resources and support systems Description 
(eg: Home Health, Palliative Care, Advanced Medical Directive) Outcome: Progressing Towards Goal 
Goal: *Describes smoking cessation resources Outcome: Progressing Towards Goal 
Goal: *Understands and describes signs and symptoms to report to providers(Stroke Metric) Outcome: Progressing Towards Goal 
Goal: *Describes/verbalizes understanding of follow-up/return appt Description 
(eg: to physicians, diabetes treatment coordinator, and other resources Outcome: Progressing Towards Goal 
Goal: *Describes importance of continuing daily weights and changes to report to physician Outcome: Progressing Towards Goal 
  
Problem: Patient Education: Go to Patient Education Activity Goal: Patient/Family Education Outcome: Progressing Towards Goal 
  
Problem: Discharge Planning Goal: *Discharge to safe environment Outcome: Progressing Towards Goal 
  
Problem: Patient Education: Go to Patient Education Activity Goal: Patient/Family Education Outcome: Progressing Towards Goal 
  
Problem: Patient Education: Go to Patient Education Activity Goal: Patient/Family Education Outcome: Progressing Towards Goal 
  
Problem: Infection - Risk of, Surgical Site Infection Goal: *Absence of surgical site infection signs and symptoms Outcome: Progressing Towards Goal 
  
Problem: Patient Education: Go to Patient Education Activity Goal: Patient/Family Education Outcome: Progressing Towards Goal 
  
Problem: Infection - Risk of, Surgical Site Infection Goal: *Absence of surgical site infection signs and symptoms Outcome: Progressing Towards Goal 
  
Problem: Patient Education: Go to Patient Education Activity Goal: Patient/Family Education Outcome: Progressing Towards Goal 
  
Problem: Patient Education: Go to Patient Education Activity Goal: Patient/Family Education Outcome: Progressing Towards Goal 
  
Problem: Nutrition Deficit Goal: *Optimize nutritional status Outcome: Progressing Towards Goal

## 2019-11-01 NOTE — PROGRESS NOTES
Problem: Dysphagia (Adult) Goal: *Acute Goals and Plan of Care (Insert Text) Description Speech Pathology Initiated 10/28/19 1. Patient will tolerate regular diet/thin liquids without overt s/s of aspiration within 7 days 2. Patient will participate in [facility] for further objective assessment of swallow physiology within 7 days (once medically stable from Impella/cardiac sx standpoint) Outcome: Progressing Towards Goal 
  
SPEECH LANGUAGE PATHOLOGY DYSPHAGIA TREATMENT Patient: Matt Bailey (75 y.o. male) Date: 11/1/2019 Diagnosis: Acute decompensated heart failure ([facility] [address] [I50.9] <principal problem not specified> Procedure(s) (LRB): 
RIGHT AXILLARY IMPELLA INSERTION (Right) 3 Days Post-Op Precautions:  Fall(impella R axillary ) ASSESSMENT: 
Patient presents with suspected mild-moderate pharyngeal dysphagia characterized by reduced laryngeal elevation/excursion. Hoarse vocal quality due to left vocal cord paresis persists. Patient tolerated PO trials without overt s/s aspiration this date. Patient and RN report no difficulty. Continue to plan for MBS as medically appropriate for further assessment of pharyngeal dysphagia. PLAN: 
Recommendations and Planned Interventions: 
Regular diet Thin liquids Sit up for all PO Small sips BRYON as medically appropriate per MD 
Patient continues to benefit from skilled intervention to address the above impairments. Continue treatment per established plan of care. Discharge Recommendations: To Be Determined SUBJECTIVE:  
Patient stated I ate breakfast already. Patient agreed to session, RN approved visit. OBJECTIVE:  
Cognitive and Communication Status: 
Neurologic State: Alert Orientation Level: Oriented X4 Cognition: Appropriate for age attention/concentration, Follows commands Perception: Appears intact Perseveration: No perseveration noted Safety/Judgement: Awareness of environment, Insight into deficits Dysphagia Treatment: Oral Assessment: 
Oral Assessment Labial: No impairment Dentition: Upper & lower dentures Oral Hygiene: Moist oral mucosa Lingual: No impairment Velum: No impairment Mandible: No impairment P.O. Trials: 
Patient Position: Upright in bed Vocal quality prior to P.O.: Hoarse Consistency Presented: Thin liquid How Presented: Self-fed/presented;Straw;Successive swallows Bolus Acceptance: No impairment Bolus Formation/Control: No impairment Propulsion: No impairment Oral Residue: None Initiation of Swallow: No impairment Laryngeal Elevation: Decreased Aspiration Signs/Symptoms: None Pharyngeal Phase Characteristics: (Suspect reduced laryngeal elevation/excursion) Effective Modifications: None Oral Phase Severity: No impairment Pharyngeal Phase Severity : Mild-moderate After treatment:  
?              Patient left in no apparent distress sitting up in chair ? Patient left in no apparent distress in bed 
? Call bell left within reach ? Nursing notified ? Caregiver present ? Bed alarm activated COMMUNICATION/EDUCATION:  
Patient was educated regarding role of SLP, POC. The patients plan of care including recommendations, planned interventions, and recommended diet changes were discussed with: Registered Nurse. ? Posted safety precautions in patient's room. NICO Freed Time Calculation: 14 mins

## 2019-11-01 NOTE — PROGRESS NOTES
[facility] [place] [facility] [place] [address] [address], [address] W: [phone]  F: [phone] Reason for Visit:  
Natasha Flanagan is a 71 y.o. male who is seen in consultation at the request of Dr. Abraham Holley for evaluation of lung cancer, thrombocytopenia and iron deficiency anemia Treatment History: · Erythropoietin · History of Present Illness: The patient is a very pleasant approximately an 80-pack-year history of tobacco consumption who stopped smoking about a decade ago when he had his heart attack. He is done quite well from that perspective. He does use oxygen occasionally at night when he is at home. Recent CT scan suggests that he may very well have a bronchogenic carcinoma with mediastinal lymphadenopathy. Work-up of that process is still in progress and is scheduled for a PET scan after discharge. Patient does have iron deficiency with an iron saturation of 9% has had some problems constipation and with blood in his urine. He will need to have a colonoscopy at some point to rule out a GI source of blood loss. And will end up needing a cystoscopy due to the hematuria and his history of tobacco consumption to rule out a bladder cancer. Patient is doing much better from his heart failure perspective. He says he is been able to diurese a lot of fluid and his breathing is much improved. He is on oxygen at this time and is sitting in a chair and appears to be very comfortable. Platelet count on admission was approximately 175,000 it is now at 91,000. Serotonin releasing test was done today. Patient does have elevated factor VIII and von Willebrand's factors consistent with an acute phase reactant from the stress and trauma that he is been under there is no evidence for a low level and so there is nothing to be concerned about from a von [employer]. Past Medical History:  
Diagnosis Date  Degenerative disc disease, lumbar  Heart failure ([facility] [address]  High cholesterol  Hypertension  Paroxysmal atrial fibrillation ([facility] [address] 2019  Spinal stenosis Past Surgical History:  
Procedure Laterality Date  HX APPENDECTOMY  HX CORONARY ARTERY BYPASS GRAFT    
 triple  HX HERNIA REPAIR    
 HX IMPLANTABLE CARDIOVERTER DEFIBRILLATOR  GA CARDIOVERSION ELECTIVE ARRHYTHMIA EXTERNAL N/A 6/10/2019 EP CARDIOVERSION performed by John Santamaria MD at [place] [address], [address] CATH LAB  GA CARDIOVERSION ELECTIVE ARRHYTHMIA EXTERNAL N/A 2019 EP CARDIOVERSION performed by Gurpreet Gavin MD at [place] [address], [address] CATH LAB  GA INSJ/RPLCMT PERM DFB W/TRNSVNS LDS 1/DUAL CHMBR N/A 2019 INSERT ICD BIV MULTI performed by Lorena Aranda MD at [place] [address], [address] CATH LAB  GA TCAT IMPL WRLS P-ART PRS SNR L-T HEMODYN MNTR N/A 2019 IMPLANT HEART FAILURE MONITORING DEVICE performed by Rey Bahena MD at [place] [address], [address] CATH LAB Social History Tobacco Use  Smoking status: Former Smoker Last attempt to quit: 2010 Years since quittin.9  Smokeless tobacco: Never Used Substance Use Topics  Alcohol use: Not Currently Comment: rarely Family History Problem Relation Age of Onset  Lung Disease Mother  Hypertension Mother Fadi Thomas Arthritis-osteo Mother  Heart Disease Mother  Heart Disease Father  Diabetes Father Current Facility-Administered Medications Medication Dose Route Frequency  epoetin yvonne-epbx (RETACRIT) injection 10,000 Units  10,000 Units SubCUTAneous Q TUE, THU & SAT  pantoprazole (PROTONIX) tablet 40 mg  40 mg Oral ACB  metoclopramide HCl (REGLAN) injection 5 mg  5 mg IntraVENous Q6H  
 dextrose 5% infusion  4-20 mL/hr IntraVENous CONTINUOUS  
 bivalirudin (ANGIOMAX) 250 mg in dextrose 5% 250 mL infusion  4-20 mL/hr Other TITRATE  alteplase (CATHFLO) 1 mg in sterile water (preservative free) 1 mL injection  1 mg InterCATHeter PRN  
 bacitracin 500 unit/gram packet 1 Packet  1 Packet Topical PRN  
 sodium chloride (NS) flush 5-40 mL  5-40 mL IntraVENous Q8H  
 sodium chloride (NS) flush 5-40 mL  5-40 mL IntraVENous PRN  
 0.45% sodium chloride infusion  10 mL/hr IntraVENous CONTINUOUS  
 0.9% sodium chloride infusion  10 mL/hr IntraVENous CONTINUOUS  
 oxyCODONE IR (ROXICODONE) tablet 5 mg  5 mg Oral Q4H PRN  
 oxyCODONE IR (ROXICODONE) tablet 10 mg  10 mg Oral Q4H PRN  
 morphine injection 4 mg  4 mg IntraVENous Q2H PRN  
 naloxone (NARCAN) injection 0.4 mg  0.4 mg IntraVENous PRN  
 mupirocin (BACTROBAN) 2 % ointment   Both Nostrils BID  ondansetron (ZOFRAN) injection 4 mg  4 mg IntraVENous Q4H PRN  
 albuterol (PROVENTIL VENTOLIN) nebulizer solution 2.5 mg  2.5 mg Nebulization Q4H PRN  chlorhexidine (PERIDEX) 0.12 % mouthwash 10 mL  10 mL Oral Q12H  
 magnesium oxide (MAG-OX) tablet 400 mg  400 mg Oral BID  calcium chloride 1 g in 0.9% sodium chloride 250 mL IVPB  1 g IntraVENous PRN  
 bisacodyl (DULCOLAX) suppository 10 mg  10 mg Rectal DAILY PRN  
 senna-docusate (PERICOLACE) 8.6-50 mg per tablet 1 Tab  1 Tab Oral BID  polyethylene glycol (MIRALAX) packet 17 g  17 g Oral DAILY  ELECTROLYTE REPLACEMENT NOTE: Nurse to review Serum Potassium and Magnesuim levels and Initiate Electrolyte Replacement Protocol as needed  1 Each Other PRN  
 magnesium sulfate 1 g/100 ml IVPB (premix or compounded)  1 g IntraVENous PRN  
 glucose chewable tablet 16 g  4 Tab Oral PRN  
 glucagon (GLUCAGEN) injection 1 mg  1 mg IntraMUSCular PRN  
 dextrose 10% infusion 0-250 mL  0-250 mL IntraVENous PRN  
 morphine injection 2 mg  2 mg IntraVENous Q2H PRN  
 melatonin tablet 3 mg  3 mg Oral QHS PRN  
 ceFAZolin (ANCEF) 2 g/20 mL in sterile water IV syringe  2 g IntraVENous Q8H  
 albumin human 5% (BUMINATE) solution 25 g  25 g IntraVENous Q2H PRN  
  insulin lispro (HUMALOG) injection   SubCUTAneous Q6H  
 [Held by provider] spironolactone (ALDACTONE) tablet 25 mg  25 mg Oral DAILY  milrinone (PRIMACOR) 20 MG/100 ML D5W infusion  0.3 mcg/kg/min IntraVENous CONTINUOUS  
 amiodarone (CORDARONE) tablet 100 mg  100 mg Oral BID  influenza vaccine 2019-20 (6 mos+)(PF) (FLUARIX/FLULAVAL/FLUZONE QUAD) injection 0.5 mL  0.5 mL IntraMUSCular PRIOR TO DISCHARGE  
 [Held by provider] aspirin delayed-release tablet 81 mg  81 mg Oral DAILY  tamsulosin (FLOMAX) capsule 0.8 mg  0.8 mg Oral DAILY  allopurinol (ZYLOPRIM) tablet 100 mg  100 mg Oral DAILY No Known Allergies Review of Systems: A complete review of systems was obtained, negative except as described above. Physical Exam:  
 
Visit Vitals /55 (BP 1 Location: Left arm, BP Patient Position: At rest) Pulse 72 Temp 97.7 °F (36.5 °C) Resp 15 Ht 6' 2\" (1.88 m) Wt 176 lb 5.9 oz (80 kg) SpO2 97% BMI 22.64 kg/m² ECOG PS: 2 General: No distress Eyes: PERRLA, anicteric sclerae HENT: Atraumatic, OP clear Neck: Supple Lymphatic: No cervical, supraclavicular, axillary adenopathy Respiratory: CTAB, normal respiratory effort CV: Normal rate, regular rhythm GI: Soft, nontender, nondistended, no masses, no hepatomegaly, no splenomegaly MS:  Digits without clubbing or cyanosis. Skin: No rashes, ecchymoses, or petechiae. Normal temperature, turgor, and texture. Psych: Alert, oriented, appropriate affect, normal judgment/insight Results:  
 
Lab Results Component Value Date/Time WBC 4.3 11/01/2019 05:12 AM  
 HGB 9.0 (L) 11/01/2019 05:12 AM  
 HCT 28.8 (L) 11/01/2019 05:12 AM  
 PLATELET 90 (L) 43/57/2973 05:12 AM  
 MCV 92.3 11/01/2019 05:12 AM  
 ABS. NEUTROPHILS 3.6 10/26/2019 03:33 PM  
 
Lab Results Component Value Date/Time  Sodium 130 (L) 11/01/2019 04:19 AM  
 Potassium 4.0 11/01/2019 04:19 AM  
 Chloride 96 (L) 11/01/2019 04:19 AM  
 CO2 30 11/01/2019 04:19 AM  
 Glucose 110 (H) 11/01/2019 04:19 AM  
 BUN 26 (H) 11/01/2019 04:19 AM  
 Creatinine 1.35 (H) 11/01/2019 04:19 AM  
 GFR est AA >60 11/01/2019 04:19 AM  
 GFR est non-AA 52 (L) 11/01/2019 04:19 AM  
 Calcium 8.6 11/01/2019 04:19 AM  
 Glucose (POC) 144 (H) 11/01/2019 06:25 AM  
 
Lab Results Component Value Date/Time Bilirubin, total 1.6 (H) 11/01/2019 04:19 AM  
 ALT (SGPT) 7 (L) 11/01/2019 04:19 AM  
 AST (SGOT) 30 11/01/2019 04:19 AM  
 Alk. phosphatase 115 11/01/2019 04:19 AM  
 Protein, total 5.8 (L) 11/01/2019 04:19 AM  
 Albumin 2.7 (L) 11/01/2019 04:19 AM  
 Globulin 3.1 11/01/2019 04:19 AM  
 
 
Records reviewed and summarized above. Radiology report(s) reviewed CAT CAP 10/25/19 IMPRESSION:  
1. Low-grade nonspecific mediastinal adenopathy. 2. Trace right pleural effusion. 3. Emphysema. 4. Left renal atrophy. 5. Colonic diverticula. 6. Moderate bladder distention with diverticula Assessment:  
1) mediastinal lymphadenopathy possible bronchogenic carcinoma. 2) anemia with iron deficiency need to be concerned about colonic and bladder cancer. 3) thrombocytopenia work up in progress to R/o HIT 
 
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 When he is well enough he will need a colonoscopy and probably a cystoscopy. We will go ahead and get him some IV iron as an outpatient if needed. · I will plan on seeing him in the office to help coordinate these issues. ·  
I appreciate the opportunity to participate in Mr. Sona Kiser's care.  
 
Signed By: Falguni Chavez MD

## 2019-11-01 NOTE — PROGRESS NOTES
1930: Bedside shift report received from [facility]. 0400: am labs drawn SVO2- 70%. Kale calibrated. 0730: Bedside and Verbal shift change report given to Ervin Rios (oncoming nurse) by Lisandra Parry (offgoing nurse). Report included the following information SBAR, Kardex, Procedure Summary, MAR, Accordion and Med Rec Status.

## 2019-11-01 NOTE — CDMP QUERY
Patient admitted with acute decompensated heart failure. Noted documentation of  CKD 4 per nephrology, and CKD stage 3 per attending this hospitalization. If possible, please document in progress notes and d/c summary if you are evaluating and /or treating any of the following: 
 
=>CKD 4  confirmed and CKD 3 ruled out 
=>CKD 3 confirmed and  CKD 4 ruled out 
=>Other 
=>Unable to determine The medical record reflects the following: 
  Risk Factors: Per 6/24/19 hospitalization, documented CKD stage 3 Clinical Indicators:  
Per H&P -chronic kidney disease 3-single kidney-Cr 2.18, GFR 30 
10/28-Renal scan-The images demonstrate diffusely decreased extraction and excretion and excretion from the left kidney compared with the right with globally decreased excretion bilaterally. The images demonstrate radiotracer retention in both kidneys. 10/30-CKD stage 3 
11/1-Per nephrology, CKD 4 at baseline with left renal atrophy Treatment: Nephrology consult, monitor renal labwork, IV bumex gtt Thank you, Kathy MITCHELLN, RN 
[facility]  
[phone]

## 2019-11-01 NOTE — PROGRESS NOTES
1600  Developing a nose bleed patient trying to control with tissue. 1930  Bedside shift change report given to Shelley (oncoming nurse) by Chari Xie (offgoing nurse). Report included the following information SBAR, Procedure Summary, Intake/Output, MAR and Recent Results.

## 2019-11-01 NOTE — CONSULTS
Infectious Disease Consult Today's Date: 2019 Admit Date: 10/25/2019 Impression: · UTI--recurrent--Enterobacter from last 2 urine cultures · CHF · History of sternal wound infection Plan: · IV antibiotic therapy · Follow up cultures and adjust meds Anti-infectives: · Cefazolin Subjective:  
Date of Consultation:  2019 Referring Physician: Dr Banks Friends Patient is a 71 y.o. male admitted with CHF. He has been found to have UTI with GNR in culture. He has a history of UTIs in the recent past, both with Enterobacter from culture. He has had urinary frequency, but no other systemic complaints. He is know to me from care given years ago for sternal wound infection. He is on cefazolin and we are asked to see him in consultation. Patient Active Problem List  
Diagnosis Code  Paroxysmal atrial fibrillation ([facility]) I48.0  Acute on chronic systolic CHF (congestive heart failure) ([facility]) I50.23  Systolic CHF, chronic ([facility]) I50.22  
 Peripheral vascular disease ([facility]) I73.9  Acute decompensated heart failure ([facility]) I50.9 Past Medical History:  
Diagnosis Date  Degenerative disc disease, lumbar  Heart failure ([facility] [address]  High cholesterol  Hypertension  Paroxysmal atrial fibrillation ([facility] [address] 2019  Spinal stenosis Family History Problem Relation Age of Onset  Lung Disease Mother  Hypertension Mother [address] [place] [address] Arthritis-osteo Mother  Heart Disease Mother  Heart Disease Father  Diabetes Father Social History Tobacco Use  Smoking status: Former Smoker Last attempt to quit: 2010 Years since quittin.9  Smokeless tobacco: Never Used Substance Use Topics  Alcohol use: Not Currently Comment: rarely Past Surgical History:  
Procedure Laterality Date  HX APPENDECTOMY  HX CORONARY ARTERY BYPASS GRAFT    
 triple  HX HERNIA REPAIR    
 HX IMPLANTABLE CARDIOVERTER DEFIBRILLATOR    
  [place] CARDIOVERSION ELECTIVE ARRHYTHMIA EXTERNAL N/A 6/10/2019 EP CARDIOVERSION performed by Louann Valdivia MD at [place] [address], [facility] CATH LAB  [place] CARDIOVERSION ELECTIVE ARRHYTHMIA EXTERNAL N/A 6/18/2019 EP CARDIOVERSION performed by Bossman Reid MD at [place] [address], [facility] CATH LAB  OH INSJ/RPLCMT PERM DFB W/TRNSVNS LDS 1/DUAL CHMBR N/A 6/21/2019 INSERT ICD BIV MULTI performed by Marii Rey MD at [place] [address], [facility] [address] CATH LAB  [place] TCAT IMPL WRLS P-ART PRS SNR L-T HEMODYN MNTR N/A 9/18/2019 IMPLANT HEART FAILURE MONITORING DEVICE performed by Aurora Lucio MD at [place] [address], [facility] [address] CATH LAB Prior to Admission medications Medication Sig Start Date End Date Taking? Authorizing Provider  
apixaban (ELIQUIS) 5 mg tablet Take 5 mg by mouth two (2) times a day. Yes Provider, Historical  
amiodarone (CORDARONE) 200 mg tablet TAKE 1 TABLET TWICE A DAY 10/29/19   Gene Altman MD  
escitalopram oxalate (LEXAPRO) 5 mg tablet TAKE 1 TABLET BY MOUTH EVERY DAY 10/10/19   Provider, Historical  
torsemide (DEMADEX) 20 mg tablet Take 3 Tabs by mouth two (2) times a day. 10/2/19   Durga Potter MD  
tamsulosin (FLOMAX) 0.4 mg capsule Take 0.4 mg by mouth daily. 7/18/19   Provider, Historical  
carvedilol (COREG) 6.25 mg tablet Take 1 Tab by mouth two (2) times daily (with meals). 7/31/19   Durga Potter MD  
milrinone (PRIMACOR) 20 mg/100 mL (200 mcg/mL) infusion 18.14 mcg/min by IntraVENous route continuous. 6/26/19   Kailey Dinh MD  
aspirin delayed-release 81 mg tablet Take 81 mg by mouth daily. Provider, Historical  
spironolactone (ALDACTONE) 25 mg tablet Take 25 mg by mouth daily. Provider, Historical  
 
 
No Known Allergies Review of Systems:  A comprehensive review of systems was negative except for that written in the History of Present Illness. Objective:  
 
Visit Vitals /55 (BP 1 Location: Left arm, BP Patient Position: At rest) Pulse 83 Temp 99.3 °F (37.4 °C) Resp 12 Ht 6' 2\" (1.88 m) Wt 80 kg (176 lb 5.9 oz) SpO2 90% BMI 22.64 kg/m² Temp (24hrs), Av.5 °F (36.9 °C), Min:97.7 °F (36.5 °C), Max:99.3 °F (37.4 °C) Lines:  Arterial Line:  , Central Venous Catheter:    and PICC:    
 
Physical Exam:  Lungs:  clear to auscultation bilaterally Heart:  regular rate and rhythm Abdomen:  soft, non-tender. Bowel sounds normal. No masses,  no organomegaly Skin:  no rash or abnormalities Data Review: CBC: 
Recent Labs 19 
5383 10/31/19 
1059 10/31/19 
0510 10/30/19 
0448 WBC 4.3  --  4.1 4.0* HGB 9.0* 9.4* 8.9* 10.6* HCT 28.8* 29.5* 28.8* 33.0*  
PLT 90*  --  91* 140* BMP: 
Recent Labs 19 
4209 10/31/19 
0510 10/30/19 
0448 CREA 1.35* 1.57* 1.88* BUN 26* 27* 28* * 130* 132* K 4.0 3.8 3.2*  
CL 96* 94* 91* CO2 30 32 33* AGAP 4* 4* 8 * 97 108* LFTS: 
Recent Labs 19 
4489 10/31/19 
0510 10/30/19 
0448 TBILI 1.6* 2.4* 2.2* ALT 7* 12 41 SGOT 30 34 52*  98 126* TP 5.8* 5.9* 6.9 ALB 2.7* 2.9* 3.2* Microbiology:  
 
All Micro Results Procedure Component Value Units Date/Time CULTURE, URINE [[id number]]  (Abnormal) Collected:  10/31/19 1105 Order Status:  Completed Specimen:  Urine from [place] Specimen Updated:  19 1207 Special Requests: NO SPECIAL REQUESTS [place] Count --     
  >100,000 COLONIES/mL Culture result: GRAM NEGATIVE RODS     
   CHECK POSSIBLE GRAM POSITIVE COCCI URINE CULTURE HOLD SAMPLE [[id number]] Collected:  10/31/19 1100 Order Status:  Completed Specimen:  Serum Updated:  10/31/19 1919 Urine culture hold URINE ON HOLD IN MICROBIOLOGY DEPT FOR 3 DAYS. IF UNPRESERVED URINE IS SUBMITTED, IT CANNOT BE USED FOR ADDITIONAL TESTING AFTER 24 HRS, RECOLLECTION WILL BE REQUIRED. CULTURE, URINE [[id number]] Collected:  10/31/19 1100 Order Status:  Canceled URINE CULTURE HOLD SAMPLE [[id number]] Collected:  10/31/19 1048 Order Status:  Canceled Specimen:  Urine CULTURE, BLOOD, PAIRED [[id number]] Collected:  10/25/19 1914 Order Status:  Completed Specimen:  Blood Updated:  10/30/19 1552 Special Requests: NO SPECIAL REQUESTS Culture result: NO GROWTH 5 DAYS URINE CULTURE HOLD SAMPLE [[id number]] Collected:  10/25/19 1730 Order Status:  Completed Specimen:  Urine from Serum Updated:  10/25/19 1747 Urine culture hold URINE ON HOLD IN MICROBIOLOGY DEPT FOR 3 DAYS. IF UNPRESERVED URINE IS SUBMITTED, IT CANNOT BE USED FOR ADDITIONAL TESTING AFTER 24 HRS, RECOLLECTION WILL BE REQUIRED. Imaging:  
Reviewed Signed By: January Mann MD   
 November 1, 2019

## 2019-11-01 NOTE — PROGRESS NOTES
[facility] Progress Note DOS:  11/1/2019 REFERRING PROVIDER:  Magen Cash MD 
PRIMARY CARE PHYSICIAN: Gita Nogueira MD 
PRIMARY CARDIOLOGIST: Magen Cash MD 
 
 
 
CC: DOUGLAS, fatigue HPI: Tammie Vidal is a 71y.o. year old pleasant white male with a history of HTN, HLD, JOSE, CAD s/p cardiac arrest VFib s/p CABG (2011) c/b sternal would infection and sternectomy, ischemic cardiomyopathy LVEF 15-20%, s/p ICD and with LBBB. He was admitted with acute on chronic systolic heart failure with massive volume overload > 20 lbs, in the setting of atrial fibrillation s/p failed DCCV and underwent DCCV and RHC on 6/18. S/p BiVICD on 6/21/19 with Dr. Amy Sanchez. He was discharged home home on IV milrinone on 6/26/19. He has been followed closely by Dr. Arnoldo Deleon and the [facility]. Mr. Leroy Herman returns to clinic for follow up with his wife. His dyspnea has progressed to the point he is sitting in a chair most of the day. He is unable to perform simple ADLs without limiting dyspnea. His appetite is down. He underwent a sleep study and is using CPAP but has difficulty due to frequent nocturia. He denies presyncope or syncope. He has had a few falls due to generalized weakness but did not hit his head nor lose consciousness. Recent labs reveal stable creatinine from 1.9 - 2.1. He is currently taking torsemide 60 mg bid. His wife has noticed an odor to his urine. He denies fever/chills but admits to feeling cold all the time. Recent Events: 
CVP improved Hematuria IMPRESSION/PLAN: 
 Acute on chronic systolic heart failure 
 ICM, Stage D, NYHA Class IV, LVEF 16-20%, s/p CRT on 6/21/19 S/p Impella 5.0 implant 10/29 CRT non-responder Intolerant to RAASi d/t renal dysfunction Continue IV milrinone to 0.3 mcgs/kg/min- will try to wean slowly Hemodynamic parameters CI > 2, CVP < 14, MAP > 65 Start bumex 2 IV TID Hold Spironolactone due to IVVD Trend PBNP, CMP  Strict I/O 
 Daily Weights Follow renal function and electrolytes closely Eval in process: Needs Chest CT- ordered Timing of colonoscopy, EGD early next week [facility] - Monday w/ Dr. Cornel Alford No dental needed- has dentures Continue pre- op education Shock Liver Improving after Impella placement LFTs trending down TBili trending down No Tolvaptan due to liver dysfunction History of VF arrest - s/p AICD No recent shocks Thrombocytopenia Platelets 90 today Monitor for now Likely multifactorial  
 
 CAD s/p CABG in 2010 Needs [facility]- timing TBD, hopefully early next week with Dr. Cornel Alford PAF s/p DCCV 6/18/19 Hold eliquis AC on hold Continue amiodarone Chronic Kidney Disease 3 - single kidney Nephrology following Renal US- no obstructive disease History of Urinary Retention UA - positive culture for GNR (preliminary) ID consult Lizama cath placed JOSE on CPAP Cont use home CPAP machine History of Sternal wound infection requiring sternectomy Stable CV surgery aware - not a contraindication for LVAD Tagged WBC negative ESR 30 Cortisol 22.4 Acute hematuria 
 improving Resume bival via purge per CSS Hold ASA for now Repeat UA + culture [facility] 
 urology consult - Monday Transfuse to keep hgb > 8 Anemia, iron deficiency Iron sat 9% S/p Venofer Repeat iron profile Sunday Hematology consult for positive Susie Raider H/o DVT 
 AC on hold for hematuria Migraines 
 stable H/o tobacco abuse Counseling - continued abstinence Depression On lexapro Anorexia - likely multifactorial (depression, congestive hepatopathy) Prealbumin 13.8 Prealbumin weekly Nutritional supplements Mediastinal Lymphadenopathy D.w with Dr. Adal Emmanuel- does not think this is lung CA, patient is too high risk for lung biopsy Repeat chest CT w/o contrast today Vocal Cord Paralysis Speech therapy recs appreciated Aspiration Precautions Thickened liquids when able to take PO 
 
PPX Bowel regimen Ancef for Impella prophylaxis Advance diet as tolerated Hold purge Bival- start D5 via impella CARDIAC EVALUATION 
ECHO (5/31/19) LVEF 21-25%, severely dilated LA, moderately dilated RA 
ECHO (6/24/19) LVEF 16-20%, Severely dilated LV, trace MR, trace TR 
 
EKG (6/12/19) atrial flutter with occasional PVCs, LBBB QRS 158ms EKG (6/26/19) Atrial fibrillation with premature ventricular or aberrantly conducted complexes, Left bundle branch block, rate 86, , QTc 564 [facility] not in epic Review of Systems:    
Review of Systems Constitutional: Negative for chills, fever and malaise/fatigue. Feels thirsty Respiratory: Negative. Cardiovascular: Negative for chest pain and leg swelling. Gastrointestinal: Negative for heartburn, nausea and vomiting. Neurological: Positive for weakness. Negative for dizziness. Endo/Heme/Allergies: Does not bruise/bleed easily. Impella Flowsheets P8 Flow 4.4 Purge Pressure 324 Purge Flow 18 Physical Exam:  
 
Visit Vitals /55 (BP 1 Location: Left arm, BP Patient Position: At rest) Pulse 96 Temp 97.7 °F (36.5 °C) Resp 17 Ht 6' 2\" (1.88 m) Wt 176 lb 5.9 oz (80 kg) SpO2 96% BMI 22.64 kg/m² Hemodynamics: 
 CO: CO (l/min): 5.5 l/min CI: CI (l/min/m2): 1.9 l/min/m2 CVP: CVP (mmHg): 14 mmHg (11/01/19 1100) SVR: SVR (dyne*sec)/cm5: 1000 (dyne*sec)/cm5 (11/01/19 1100) PAP Systolic: PAP Systolic: 67 (93/44/42 6494) PAP Diastolic: PAP Diastolic: 33 (51/10/32 0891) PVR:   
 SV02: SVO2 (%): 49 % (11/01/19 1100) SCV02: SCVO2 (%): 75 % (10/29/19 1900) Physical Exam  
Constitutional: He is oriented to person, place, and time and well-developed, well-nourished, and in no distress. He appears unhealthy. He appears cachectic. No distress. HENT:  
Head: Normocephalic. Eyes: Pupils are equal, round, and reactive to light. Neck: Normal range of motion. Neck supple. JVD present. Cardiovascular: Normal rate, regular rhythm, normal heart sounds and intact distal pulses. No murmur heard. Pulmonary/Chest: Effort normal and breath sounds normal. No respiratory distress. Abdominal: Soft. Bowel sounds are normal. He exhibits no distension. Musculoskeletal: Normal range of motion. He exhibits no edema. Neurological: He is alert and oriented to person, place, and time. Skin: Skin is warm and dry. Nursing note and vitals reviewed. History: 
Past Medical History:  
Diagnosis Date  Degenerative disc disease, lumbar  Heart failure ([facility] [address]  High cholesterol  Hypertension  Paroxysmal atrial fibrillation ([facility] [address] 4/2/2019  Spinal stenosis Past Surgical History:  
Procedure Laterality Date  HX APPENDECTOMY  HX CORONARY ARTERY BYPASS GRAFT    
 triple  HX HERNIA REPAIR    
 HX IMPLANTABLE CARDIOVERTER DEFIBRILLATOR  CT CARDIOVERSION ELECTIVE ARRHYTHMIA EXTERNAL N/A 6/10/2019 EP CARDIOVERSION performed by Dg Cook MD at [place] [address], [facility]/s Dr CATH LAB  CT CARDIOVERSION ELECTIVE ARRHYTHMIA EXTERNAL N/A 6/18/2019 EP CARDIOVERSION performed by Elizabet Phillips MD at [place] [address], [facility]/s Dr CATH LAB  CT INSJ/RPLCMT PERM DFB W/TRNSVNS LDS 1/DUAL CHMBR N/A 6/21/2019 INSERT ICD BIV MULTI performed by Oren Kendrick MD at [place] [address], [facility]/s Dr CATH LAB  CT TCAT IMPL WRLS P-ART PRS SNR L-T HEMODYN MNTR N/A 9/18/2019 IMPLANT HEART FAILURE MONITORING DEVICE performed by Lisandra Fonseca MD at [place] [address], [facility]s Dr CATH LAB Social History Socioeconomic History  Marital status:  Spouse name: Not on file  Number of children: Not on file  Years of education: Not on file  Highest education level: Not on file Occupational History  Not on file Social Needs  Financial resource strain: Not on file  Food insecurity:  
  Worry: Not on file Inability: Not on file  Transportation needs:  
  Medical: Not on file Non-medical: Not on file Tobacco Use  Smoking status: Former Smoker Last attempt to quit: 2010 Years since quittin.9  Smokeless tobacco: Never Used Substance and Sexual Activity  Alcohol use: Not Currently Comment: rarely  Drug use: Never  Sexual activity: Not on file Lifestyle  Physical activity:  
  Days per week: Not on file Minutes per session: Not on file  Stress: Not on file Relationships  Social connections:  
  Talks on phone: Not on file Gets together: Not on file Attends Druze service: Not on file Active member of club or organization: Not on file Attends meetings of clubs or organizations: Not on file Relationship status: Not on file  Intimate partner violence:  
  Fear of current or ex partner: Not on file Emotionally abused: Not on file Physically abused: Not on file Forced sexual activity: Not on file Other Topics Concern [address] Service Not Asked  Blood Transfusions Not Asked  Caffeine Concern Not Asked  Occupational Exposure Not Asked [place] Hazards Not Asked  Sleep Concern Not Asked  Stress Concern Not Asked  Weight Concern Not Asked  Special Diet Not Asked  Back Care Not Asked  Exercise Not Asked  Bike Helmet Not Asked  Seat Belt Not Asked  Self-Exams Not Asked Social History Narrative  Not on file Family History Problem Relation Age of Onset  Lung Disease Mother  Hypertension Mother Seymour Leal Arthritis-osteo Mother  Heart Disease Mother  Heart Disease Father  Diabetes Father Current Medications:  
Current Facility-Administered Medications Medication Dose Route Frequency Provider Last Rate Last Dose  bumetanide (BUMEX) injection 2 mg  2 mg IntraVENous TID Kim Hendrickson MD   2 mg at 19 1209  epoetin yvonne-epbx (RETACRIT) injection 10,000 Units  10,000 Units SubCUTAneous Q TUE, THU & SAT Tanya Ojeda MD   10,000 Units at 10/31/19 2120  pantoprazole (PROTONIX) tablet 40 mg  40 mg Oral ACB Roslyn Adams D, NP   40 mg at 11/01/19 9943  
 dextrose 5% infusion  4-20 mL/hr IntraVENous CONTINUOUS Roslyn DIOR, NP 17.1 mL/hr at 10/31/19 1002 17.1 mL/hr at 10/31/19 1002  bivalirudin (ANGIOMAX) 250 mg in dextrose 5% 250 mL infusion  4-20 mL/hr Other TITRATE Roslyn DIOR, NP 17.6 mL/hr at 11/01/19 1108 17.6 mL/hr at 11/01/19 1108  alteplase (CATHFLO) 1 mg in sterile water (preservative free) 1 mL injection  1 mg InterCATHeter PRN Mobile Max, NP      
 bacitracin 500 unit/gram packet 1 Packet  1 Packet Topical PRN Mobile Max, NP      
 sodium chloride (NS) flush 5-40 mL  5-40 mL IntraVENous Q8H Roslyn DIOR, NP   10 mL at 11/01/19 0717  
 sodium chloride (NS) flush 5-40 mL  5-40 mL IntraVENous PRN Mobile Max, NP      
 0.45% sodium chloride infusion  10 mL/hr IntraVENous CONTINUOUS Roslyn DIOR, NP 10 mL/hr at 10/29/19 1321 10 mL/hr at 10/29/19 1321  
 0.9% sodium chloride infusion  10 mL/hr IntraVENous CONTINUOUS Shana Sims B, NP 10 mL/hr at 10/31/19 1300 10 mL/hr at 10/31/19 1300  
 oxyCODONE IR (ROXICODONE) tablet 5 mg  5 mg Oral Q4H PRN Matryn Lean D, NP   5 mg at 10/31/19 0127  
 oxyCODONE IR (ROXICODONE) tablet 10 mg  10 mg Oral Q4H PRN Mobile Max, NP   10 mg at 10/31/19 1847  morphine injection 4 mg  4 mg IntraVENous Q2H PRN Annalee Max, NP      
 naloxone [facility]) injection 0.4 mg  0.4 mg IntraVENous PRN Annalee Max, NP      
 mupirocin (BACTROBAN) 2 % ointment   Both Nostrils BID Annalee Deputy NP   Stopped at 11/01/19 0900  
 ondansetron (ZOFRAN) injection 4 mg  4 mg IntraVENous Q4H PRN Roslyn DIOR NP   4 mg at 10/31/19 0812  
 albuterol (PROVENTIL VENTOLIN) nebulizer solution 2.5 mg  2.5 mg Nebulization Q4H PRN Sherrilee Bumps, NP      
 chlorhexidine (PERIDEX) 0.12 % mouthwash 10 mL  10 mL Oral Q12H Terell DIOR, NP   Stopped at 11/01/19 0900  
 magnesium oxide (MAG-OX) tablet 400 mg  400 mg Oral BID Terell DIOR, NP   400 mg at 11/01/19 8312  calcium chloride 1 g in 0.9% sodium chloride 250 mL IVPB  1 g IntraVENous PRN Sherrilee Bumps, NP      
 bisacodyl (DULCOLAX) suppository 10 mg  10 mg Rectal DAILY PRN Sherrilee Bumps, NP      
 senna-docusate (PERICOLACE) 8.6-50 mg per tablet 1 Tab  1 Tab Oral BID Sherrilee Bumps, NP   Stopped at 11/01/19 0900  polyethylene glycol (MIRALAX) packet 17 g  17 g Oral DAILY Sherrilee Bumps, NP   Stopped at 11/01/19 0900  ELECTROLYTE REPLACEMENT NOTE: Nurse to review Serum Potassium and Magnesuim levels and Initiate Electrolyte Replacement Protocol as needed  1 Each Other PRN Sherrilee Bumps, NP      
 magnesium sulfate 1 g/100 ml IVPB (premix or compounded)  1 g IntraVENous PRN Sherrilee Bumps, NP      
 glucose chewable tablet 16 g  4 Tab Oral PRN Sherrilee Bumps, NP      
 glucagon [facility]) injection 1 mg  1 mg IntraMUSCular PRN Sherrilee Bumps, NP      
 dextrose 10% infusion 0-250 mL  0-250 mL IntraVENous PRN Sherrilee Bumps, NP      
 morphine injection 2 mg  2 mg IntraVENous Q2H PRN Sherrilee Bumps, NP      
 melatonin tablet 3 mg  3 mg Oral QHS PRN Sherrilee Bumps, NP   3 mg at 10/30/19 2056  ceFAZolin (ANCEF) 2 g/20 mL in sterile water IV syringe  2 g IntraVENous Q8H Terell DIOR NP   2 g at 11/01/19 1215  albumin human 5% (BUMINATE) solution 25 g  25 g IntraVENous Q2H PRN Mickey Smith MD   25 g at 10/30/19 0670  insulin lispro (HUMALOG) injection   SubCUTAneous Q6H Mickey Smith MD   2 Units at 11/01/19 1215  [Held by provider] spironolactone (ALDACTONE) tablet 25 mg  25 mg Oral DAILY Sapphire Peterson NP   Stopped at 10/29/19 0900  milrinone (PRIMACOR) 20 MG/100 ML D5W infusion  0.3 mcg/kg/min IntraVENous CONTINUOUS Shana Sims, NP 7.6 mL/hr at 11/01/19 0759 0.3 mcg/kg/min at 11/01/19 0759  
 amiodarone (CORDARONE) tablet 100 mg  100 mg Oral BID [facility] D, NP   100 mg at 11/01/19 3501  influenza vaccine 2019-20 (6 mos+)(PF) (FLUARIX/FLULAVAL/FLUZONE QUAD) injection 0.5 mL  0.5 mL IntraMUSCular PRIOR TO DISCHARGE Wally Lee NP      
 [Held by provider] aspirin delayed-release tablet 81 mg  81 mg Oral DAILY [facility], NP   81 mg at 10/31/19 3493  tamsulosin (FLOMAX) capsule 0.8 mg  0.8 mg Oral DAILY [facility], NP   0.8 mg at 11/01/19 9940  allopurinol (ZYLOPRIM) tablet 100 mg  100 mg Oral DAILY [facility], NP   100 mg at 11/01/19 7966 Allergies: No Known Allergies Recent Labs:  
Labs Latest Ref Rng & Units 11/1/2019 10/31/2019 10/31/2019 10/30/2019 10/29/2019 10/28/2019 10/28/2019 WBC 4.1 - 11.1 K/uL 4.3 - 4.1 4. 0(L) 4.4 - 5.0  
RBC 4.10 - 5.70 M/uL 3.12(L) - 3.11(L) 3.62(L) 3.42(L) - 3.48(L) Hemoglobin 12.1 - 17.0 g/dL 9. 0(L) 9.4(L) 8. 9(L) 10. 6(L) 9.8(L) - 10. 0(L) Hematocrit 36.6 - 50.3 % 28. 8(L) 29. 5(L) 28. 8(L) 33. 0(L) 31. 3(L) - 31. 9(L) MCV 80.0 - 99.0 FL 92.3 - 92.6 91.2 91.5 - 91.7 Platelets 988 - 190 K/uL 90(L) - 91(L) 140(L) 152 - 88(L) Lymphocytes 12 - 49 % - - - - - - - Monocytes 5 - 13 % - - - - - - - Eosinophils 0 - 7 % - - - - - - - Basophils 0 - 1 % - - - - - - - Albumin 3.5 - 5.0 g/dL 2. 7(L) - 2. 9(L) 3. 2(L) 2. 4(L) - 2. 4(L) Calcium 8.5 - 10.1 MG/DL 8.6 - 8.4(L) 9.4 8.9 - 8.8 SGOT 15 - 37 U/L 30 - 34 52(H) 48(H) - 49(H) Glucose 65 - 100 mg/dL 110(H) - 97 108(H) 96 - 110(H) BUN 6 - 20 MG/DL 26(H) - 27(H) 28(H) 24(H) - 20 Creatinine 0.70 - 1.30 MG/DL 1.35(H) - 1.57(H) 1.88(H) 1.59(H) - 1.59(H) Sodium 136 - 145 mmol/L 130(L) - 130(L) 132(L) 130(L) - 132(L) Potassium 3.5 - 5.1 mmol/L 4.0 - 3.8 3. 2(L) 4.1 4.8 4.7 TSH 0.36 - 3.74 uIU/mL - - - - - - -  
PSA 0.01 - 4.0 ng/mL - - - - - - -  
LDH 85 - 241 U/L 350(H) - 312(H) 352(H) - - - Some recent data might be hidden Lab Results Component Value Date/Time WBC 4.3 11/01/2019 05:12 AM  
 HGB 9.0 (L) 11/01/2019 05:12 AM  
 HCT 28.8 (L) 11/01/2019 05:12 AM  
 PLATELET 90 (L) 96/06/7788 05:12 AM  
 MCV 92.3 11/01/2019 05:12 AM  
 
Lab Results Component Value Date/Time GFR est non-AA 52 (L) 11/01/2019 04:19 AM  
 GFR est AA >60 11/01/2019 04:19 AM  
 Creatinine 1.35 (H) 11/01/2019 04:19 AM  
 BUN 26 (H) 11/01/2019 04:19 AM  
 Sodium 130 (L) 11/01/2019 04:19 AM  
 Potassium 4.0 11/01/2019 04:19 AM  
 Chloride 96 (L) 11/01/2019 04:19 AM  
 CO2 30 11/01/2019 04:19 AM  
 Magnesium 2.2 11/01/2019 04:19 AM  
 Phosphorus 2.4 (L) 06/04/2019 04:09 AM  
 
Lab Results Component Value Date/Time TSH 2.40 10/25/2019 07:39 PM  
 T4, Free 1.7 (H) 10/25/2019 07:39 PM  
  
Lab Results Component Value Date/Time Sodium 130 (L) 11/01/2019 04:19 AM  
 Potassium 4.0 11/01/2019 04:19 AM  
 Chloride 96 (L) 11/01/2019 04:19 AM  
 CO2 30 11/01/2019 04:19 AM  
 Anion gap 4 (L) 11/01/2019 04:19 AM  
 Glucose 110 (H) 11/01/2019 04:19 AM  
 BUN 26 (H) 11/01/2019 04:19 AM  
 Creatinine 1.35 (H) 11/01/2019 04:19 AM  
 BUN/Creatinine ratio 19 11/01/2019 04:19 AM  
 GFR est AA >60 11/01/2019 04:19 AM  
 GFR est non-AA 52 (L) 11/01/2019 04:19 AM  
 Calcium 8.6 11/01/2019 04:19 AM  
 Bilirubin, total 1.6 (H) 11/01/2019 04:19 AM  
 ALT (SGPT) 7 (L) 11/01/2019 04:19 AM  
 AST (SGOT) 30 11/01/2019 04:19 AM  
 Alk. phosphatase 115 11/01/2019 04:19 AM  
 Protein, total 5.8 (L) 11/01/2019 04:19 AM  
 Albumin 2.7 (L) 11/01/2019 04:19 AM  
 Globulin 3.1 11/01/2019 04:19 AM  
 A-G Ratio 0.9 (L) 11/01/2019 04:19 AM  
  
Lab Results Component Value Date/Time Hemoglobin A1c 6.6 (H) 10/25/2019 02:56 PM  
  
 
 
Thank you for letting us see him with you, Luzma Hood, NP 
 [address], [address] Office [phone] Fax [phone] [facility] ATTENDING ADDENDUM Patient was seen and examined in person. Data and notes were reviewed. I have discussed and agree with the plan as noted in the NP note above without further additions. Tadeo Urban MD PhD 
[facility] [address] [facility]

## 2019-11-01 NOTE — PROGRESS NOTES
NYHA class IV A/C systolic heart failure Low EF (10%) Inotrope dependent Malnutrition  
LVAD Work-up Looks good this am 
 
Still working on LVAD eval 
 
Hgb looks good Platelets a little low Creatinine a little improved Bilirubin and other LFTs look good Lactic acid normal 
 
Pro-calcitonin reasonable NT pro-BNP about the same Impella - flow 4.0 L/min @ P-8 CXR - minimal pulmonary edema Intake/Output Summary (Last 24 hours) at 11/1/2019 1020 Last data filed at 11/1/2019 0800 Gross per 24 hour Intake 1390.26 ml Output 1325 ml Net 65.26 ml Visit Vitals /55 (BP 1 Location: Left arm, BP Patient Position: At rest) Pulse 72 Temp 97.7 °F (36.5 °C) Resp 15 Ht 6' 2\" (1.88 m) Wt 176 lb 5.9 oz (80 kg) SpO2 97% BMI 22.64 kg/m² Risk of morbidity and mortality - high Medical decision making - high complexity Total critical care time - 30 minutes (CPT 20071)

## 2019-11-01 NOTE — PROGRESS NOTES
Voicemail left for Steven Lagos inquiring about what time she will be at the hospital today to touch base about the caregiver contract and VAD plan of care. Juana Martinez, MSW, LCSW Clinical  [address]

## 2019-11-01 NOTE — PROGRESS NOTES
[facility] 
 [address] [facility], [address] [facility] Phone: [phone]   [phone]   
  
Nephrology Progress Note Sona Kiser     1950     [id number] Date of Admission : 10/25/2019 
11/01/19 CC:  Follow up for JUAN J, CKD, Hyponatremia Assessment and Plan JUAN J/CKD Stage IV : 
- likely from CRS 
- Cr stable and at baseline 
- stable UOP 
- inotropes ongoing per HF service 
- daily labs and strict I/Os CKD IV at baseline w/ L renal atrophy: - NM scan shows equal function Gross hematuria: 
- likely from angiomax  - improving now Hyponatremia: 
- from hypervolemia and excessive fluid intake 
- stable Hoarseness, vocal cord paralysis, mediastinal adenopathy\" - will need eventual PET/CT 
- per pulmonary 
  
BPH w/ urinary retention  
- keep neal for now 
  
Acute on Chronic HFrEF  
- EF 16-20%, NYHA Class IV , hx of VF arrest - s/p AICD 
- Impella insertion 10/29 
- ongoing w/u for LVAD - will need colonoscopy, LHC, PET/CT 
  
JOSE on CPAP  
  
PAF s/p DCCV 6/19 
  
Chronic Anemia: 
- from CKD and iron deficiency - s/p IV iron, now on PAVEL Interval History:   
Seen and examined. Cr better, hematuria improving off angiomax. No cp or sob reported. CVP around 8. Stable UOP. On milrinone. Off all diuretics. CXR noted. Review of Systems: Pertinent items are noted in HPI. Current Medications:  
Current Facility-Administered Medications Medication Dose Route Frequency  epoetin yvonne-epbx (RETACRIT) injection 10,000 Units  10,000 Units SubCUTAneous Q TUE, THU & SAT  pantoprazole (PROTONIX) tablet 40 mg  40 mg Oral ACB  metoclopramide HCl (REGLAN) injection 5 mg  5 mg IntraVENous Q6H  
 dextrose 5% infusion  4-20 mL/hr IntraVENous CONTINUOUS  
 bivalirudin (ANGIOMAX) 250 mg in dextrose 5% 250 mL infusion  4-20 mL/hr Other TITRATE  alteplase (CATHFLO) 1 mg in sterile water (preservative free) 1 mL injection  1 mg InterCATHeter PRN  
 bacitracin 500 unit/gram packet 1 Packet  1 Packet Topical PRN  
 sodium chloride (NS) flush 5-40 mL  5-40 mL IntraVENous Q8H  
 sodium chloride (NS) flush 5-40 mL  5-40 mL IntraVENous PRN  
 0.45% sodium chloride infusion  10 mL/hr IntraVENous CONTINUOUS  
 0.9% sodium chloride infusion  10 mL/hr IntraVENous CONTINUOUS  
 oxyCODONE IR (ROXICODONE) tablet 5 mg  5 mg Oral Q4H PRN  
 oxyCODONE IR (ROXICODONE) tablet 10 mg  10 mg Oral Q4H PRN  
 morphine injection 4 mg  4 mg IntraVENous Q2H PRN  
 naloxone (NARCAN) injection 0.4 mg  0.4 mg IntraVENous PRN  
 mupirocin (BACTROBAN) 2 % ointment   Both Nostrils BID  ondansetron (ZOFRAN) injection 4 mg  4 mg IntraVENous Q4H PRN  
 albuterol (PROVENTIL VENTOLIN) nebulizer solution 2.5 mg  2.5 mg Nebulization Q4H PRN  chlorhexidine (PERIDEX) 0.12 % mouthwash 10 mL  10 mL Oral Q12H  
 magnesium oxide (MAG-OX) tablet 400 mg  400 mg Oral BID  calcium chloride 1 g in 0.9% sodium chloride 250 mL IVPB  1 g IntraVENous PRN  
 bisacodyl (DULCOLAX) suppository 10 mg  10 mg Rectal DAILY PRN  
 senna-docusate (PERICOLACE) 8.6-50 mg per tablet 1 Tab  1 Tab Oral BID  polyethylene glycol (MIRALAX) packet 17 g  17 g Oral DAILY  ELECTROLYTE REPLACEMENT NOTE: Nurse to review Serum Potassium and Magnesuim levels and Initiate Electrolyte Replacement Protocol as needed  1 Each Other PRN  
 magnesium sulfate 1 g/100 ml IVPB (premix or compounded)  1 g IntraVENous PRN  
 glucose chewable tablet 16 g  4 Tab Oral PRN  
 glucagon (GLUCAGEN) injection 1 mg  1 mg IntraMUSCular PRN  
 dextrose 10% infusion 0-250 mL  0-250 mL IntraVENous PRN  
 morphine injection 2 mg  2 mg IntraVENous Q2H PRN  
 melatonin tablet 3 mg  3 mg Oral QHS PRN  
 ceFAZolin (ANCEF) 2 g/20 mL in sterile water IV syringe  2 g IntraVENous Q8H  
 albumin human 5% (BUMINATE) solution 25 g  25 g IntraVENous Q2H PRN  
  insulin lispro (HUMALOG) injection   SubCUTAneous Q6H  
 [Held by provider] spironolactone (ALDACTONE) tablet 25 mg  25 mg Oral DAILY  milrinone (PRIMACOR) 20 MG/100 ML D5W infusion  0.3 mcg/kg/min IntraVENous CONTINUOUS  
 amiodarone (CORDARONE) tablet 100 mg  100 mg Oral BID  influenza vaccine 2019-20 (6 mos+)(PF) (FLUARIX/FLULAVAL/FLUZONE QUAD) injection 0.5 mL  0.5 mL IntraMUSCular PRIOR TO DISCHARGE  
 [Held by provider] aspirin delayed-release tablet 81 mg  81 mg Oral DAILY  tamsulosin (FLOMAX) capsule 0.8 mg  0.8 mg Oral DAILY  allopurinol (ZYLOPRIM) tablet 100 mg  100 mg Oral DAILY No Known Allergies Objective: 
Vitals:   
Vitals:  
 11/01/19 0600 11/01/19 0700 11/01/19 0800 11/01/19 0900 BP:      
Pulse: 83 74 73 86 Resp: 23 21 18 17 Temp:   97.7 °F (36.5 °C) SpO2: 93% 97% 97% 98% Weight: 80 kg (176 lb 5.9 oz) Height:      
 
Intake and Output: 
11/01 0701 - 11/01 1900 In: 68 [I.V.:76] Out: 80 [Urine:80] 10/30 1901 - 11/01 0700 In: 6670 [I.V.:5362] Out: 2330 [MUGSN:[id number]] Physical Examination: 
Pt intubated     No 
General: Awake and alert Neck:  Supple, no mass Resp:  Lungs few dependent rales CV:  RRR,  no murmur or rub, trace b/l LE edema GI:  Soft, NT, + Bowel sounds Neurologic:  AOx3, nonfocal exam 
Psych:             Normal mood and affect Skin:  No Rash :  Lizama w/ blood-tinged urine [x]    High complexity decision making was performed 
[]    Patient is at high-risk of decompensation with multiple organ involvement Lab Data Personally Reviewed: I have reviewed all the pertinent labs, microbiology data and radiology studies during assessment. Recent Labs 11/01/19 
0339 11/01/19 
4332 10/31/19 
0510 10/30/19 
0448 NA  --  130* 130* 132* K  --  4.0 3.8 3.2*  
CL  --  96* 94* 91* CO2  --  30 32 33* GLU  --  110* 97 108* BUN  --  26* 27* 28* CREA  --  1.35* 1.57* 1.88* CA  --  8.6 8.4* 9.4 MG  --  2.2 2.1 2.4 ALB  --  2.7* 2.9* 3.2* SGOT  --  30 34 52* ALT  --  7* 12 41 INR 1.1  --  2.3* 1.3* Recent Labs 11/01/19 
0638 10/31/19 
1059 10/31/19 
0510 10/30/19 
0448 WBC 4.3  --  4.1 4.0* HGB 9.0* 9.4* 8.9* 10.6* HCT 28.8* 29.5* 28.8* 33.0*  
PLT 90*  --  91* 140* No results found for: SDES Lab Results Component Value Date/Time Culture result: NO GROWTH 5 DAYS 10/25/2019 07:14 PM  
 Culture result: ENTEROBACTER CLOACAE (A) 06/26/2019 12:25 PM  
 Culture result: NO GROWTH 1 DAY 06/13/2019 10:44 AM  
 Culture result: ENTEROBACTER CLOACAE (A) 06/04/2019 04:27 AM  
 Culture result: NO GROWTH 5 DAYS 06/04/2019 04:09 AM  
 
Recent Results (from the past 24 hour(s)) HGB & HCT Collection Time: 10/31/19 10:59 AM  
Result Value Ref Range HGB 9.4 (L) 12.1 - 17.0 g/dL HCT 29.5 (L) 36.6 - 50.3 % URINALYSIS W/MICROSCOPIC Collection Time: 10/31/19 11:00 AM  
Result Value Ref Range Color DARK YELLOW Appearance CLEAR CLEAR Specific gravity 1.015 1.003 - 1.030    
 pH (UA) 6.0 5.0 - 8.0 Protein >300 (A) NEG mg/dL Glucose 100 (A) NEG mg/dL Ketone NEGATIVE  NEG mg/dL Blood LARGE (A) NEG Urobilinogen 2.0 (H) 0.2 - 1.0 EU/dL Nitrites NEGATIVE  NEG Leukocyte Esterase TRACE (A) NEG    
 WBC 10-20 0 - 4 /hpf  
 RBC >100 (H) 0 - 5 /hpf Epithelial cells FEW FEW /lpf Bacteria NEGATIVE  NEG /hpf Hyaline cast 0-2 0 - 5 /lpf  
 UA:UC IF INDICATED URINE CULTURE ORDERED (A) CNI URINE CULTURE HOLD SAMPLE Collection Time: 10/31/19 11:00 AM  
Result Value Ref Range Urine culture hold URINE ON HOLD IN MICROBIOLOGY DEPT FOR 3 DAYS. IF UNPRESERVED URINE IS SUBMITTED, IT CANNOT BE USED FOR ADDITIONAL TESTING AFTER 24 HRS, RECOLLECTION WILL BE REQUIRED. BILIRUBIN, CONFIRM Collection Time: 10/31/19 11:00 AM  
Result Value Ref Range Bilirubin UA, confirm POSITIVE (A) NEG    
SAMPLES BEING HELD  Collection Time: 10/31/19 11:05 AM  
 Result Value Ref Range SAMPLES BEING HELD 1BLU   
 COMMENT Add-on orders for these samples will be processed based on acceptable specimen integrity and analyte stability, which may vary by analyte. GLUCOSE, POC Collection Time: 10/31/19 12:35 PM  
Result Value Ref Range Glucose (POC) 161 (H) 65 - 100 mg/dL Performed by Megan Heart ECHO ADULT COMPLETE Collection Time: 10/31/19  2:43 PM  
Result Value Ref Range Tapse 1.10 (A) 1.5 - 2.0 cm PASP 39.0 mmHg GLUCOSE, POC Collection Time: 10/31/19  6:56 PM  
Result Value Ref Range Glucose (POC) 140 (H) 65 - 100 mg/dL Performed by Megan Heart GLUCOSE, POC Collection Time: 11/01/19  2:13 AM  
Result Value Ref Range Glucose (POC) 123 (H) 65 - 100 mg/dL Performed by Lizeth Helm LD Collection Time: 11/01/19  4:19 AM  
Result Value Ref Range  (H) 85 - 241 U/L  
NT-PRO BNP Collection Time: 11/01/19  4:19 AM  
Result Value Ref Range NT pro-BNP 14,372 (H) <125 PG/ML  
MAGNESIUM Collection Time: 11/01/19  4:19 AM  
Result Value Ref Range Magnesium 2.2 1.6 - 2.4 mg/dL PROCALCITONIN Collection Time: 11/01/19  4:19 AM  
Result Value Ref Range Procalcitonin 1.0 ng/mL METABOLIC PANEL, COMPREHENSIVE Collection Time: 11/01/19  4:19 AM  
Result Value Ref Range Sodium 130 (L) 136 - 145 mmol/L Potassium 4.0 3.5 - 5.1 mmol/L Chloride 96 (L) 97 - 108 mmol/L  
 CO2 30 21 - 32 mmol/L Anion gap 4 (L) 5 - 15 mmol/L Glucose 110 (H) 65 - 100 mg/dL BUN 26 (H) 6 - 20 MG/DL Creatinine 1.35 (H) 0.70 - 1.30 MG/DL  
 BUN/Creatinine ratio 19 12 - 20 GFR est AA >60 >60 ml/min/1.73m2 GFR est non-AA 52 (L) >60 ml/min/1.73m2 Calcium 8.6 8.5 - 10.1 MG/DL Bilirubin, total 1.6 (H) 0.2 - 1.0 MG/DL  
 ALT (SGPT) 7 (L) 12 - 78 U/L  
 AST (SGOT) 30 15 - 37 U/L Alk. phosphatase 115 45 - 117 U/L Protein, total 5.8 (L) 6.4 - 8.2 g/dL Albumin 2.7 (L) 3.5 - 5.0 g/dL Globulin 3.1 2.0 - 4.0 g/dL A-G Ratio 0.9 (L) 1.1 - 2.2 LACTIC ACID Collection Time: 11/01/19  4:30 AM  
Result Value Ref Range Lactic acid 0.6 0.4 - 2.0 MMOL/L  
POC VENOUS BLOOD GAS Collection Time: 11/01/19  4:33 AM  
Result Value Ref Range Device: NASAL CANNULA Flow rate (POC) 5 L/M  
 FIO2 (POC) 40 %  
 pH, venous (POC) 7.383 7.32 - 7.42    
 pCO2, venous (POC) 48.4 41 - 51 MMHG  
 pO2, venous (POC) 38 25 - 40 mmHg HCO3, venous (POC) 28.8 (H) 23.0 - 28.0 MMOL/L  
 sO2, venous (POC) 70 65 - 88 % Base excess, venous (POC) 4 mmol/L Allens test (POC) N/A Total resp. rate 20 Site SWAN [employer] Specimen type (POC) MIXED VENOUS    
CBC W/O DIFF Collection Time: 11/01/19  5:12 AM  
Result Value Ref Range WBC 4.3 4.1 - 11.1 K/uL  
 RBC 3.12 (L) 4.10 - 5.70 M/uL HGB 9.0 (L) 12.1 - 17.0 g/dL HCT 28.8 (L) 36.6 - 50.3 % MCV 92.3 80.0 - 99.0 FL  
 MCH 28.8 26.0 - 34.0 PG  
 MCHC 31.3 30.0 - 36.5 g/dL  
 RDW 17.6 (H) 11.5 - 14.5 % PLATELET 90 (L) 784 - 400 K/uL MPV 10.0 8.9 - 12.9 FL  
 NRBC 0.0 0  WBC ABSOLUTE NRBC 0.00 0.00 - 0.01 K/uL PROTHROMBIN TIME + INR Collection Time: 11/01/19  5:12 AM  
Result Value Ref Range INR 1.1 0.9 - 1.1 Prothrombin time 11.4 (H) 9.0 - 11.1 sec PTT Collection Time: 11/01/19  5:12 AM  
Result Value Ref Range aPTT 32.6 (H) 22.1 - 32.0 sec  
 aPTT, therapeutic range     58.0 - 77.0 SECS  
TYPE & SCREEN Collection Time: 11/01/19  5:12 AM  
Result Value Ref Range Crossmatch Expiration 11/04/2019 ABO/Rh(D) O POSITIVE Antibody screen PENDING Comment Previously identified nonspecific antibody and nonspecific cold antibody GLUCOSE, POC Collection Time: 11/01/19  6:25 AM  
Result Value Ref Range Glucose (POC) 144 (H) 65 - 100 mg/dL Performed by Olvin Oliver Total time spent with patient:  xxx   min.                               
Care Plan discussed with: 
 Patient Family RN Consulting Physician [address] [facility],      
 
I have reviewed the flowsheets. Chart and Pertinent Notes have been reviewed. No change in PMH ,family and social history from Consult note.  
 
 
Alex Cantu MD

## 2019-11-01 NOTE — PROGRESS NOTES
[facility] ICU Progress Note Admit Date: 10/25/2019 POD:  2 Day Post-Op Procedure:  Procedure(s): RIGHT AXILLARY IMPELLA INSERTION Subjective:  
Pt seen with Dr. Freddie Peabody. On milrinone 0.3, dobutamine off. On NC 2L, resting in bed. Afebrile. Hematuria resolving Objective:  
Vitals: 
Blood pressure 110/55, pulse 72, temperature 97.7 °F (36.5 °C), resp. rate 15, height 6' 2\" (1.88 m), weight 176 lb 5.9 oz (80 kg), SpO2 97 %. Temp (24hrs), Av.3 °F (36.8 °C), Min:97.7 °F (36.5 °C), Max:98.9 °F (37.2 °C) Hemodynamics: 
 CO: CO (l/min): 6.1 l/min CI: CI (l/min/m2): 2 l/min/m2 CVP: CVP (mmHg): 10 mmHg (19 1000) SVR: SVR (dyne*sec)/cm5: 814 (dyne*sec)/cm5 (10/31/19 1100) PAP Systolic: PAP Systolic: 55 (46/38/09 7411) PAP Diastolic: PAP Diastolic: 28 (64/49/80 5792) PVR:   
 SV02: SVO2 (%): 62 % (19 1000) SCV02: SCVO2 (%): 75 % (10/29/19 1900) EKG/Rhythm:   
NSR Oxygen Therapy: 
Oxygen Therapy O2 Sat (%): 97 % (19 1000) Pulse via Oximetry: 72 beats per minute (19 1000) O2 Device: Nasal cannula (10/31/19 2000) O2 Flow Rate (L/min): 2 l/min (10/31/19 2000) FIO2 (%): 40 % (10/30/19 0846) CXR:  
CXR Results  (Last 48 hours) 19 0530  XR CHEST PORT Final result Impression:  IMPRESSION: Patchy opacifications throughout the lungs bilaterally without  
change. Narrative:  EXAM: XR CHEST PORT INDICATION: postop heart COMPARISON: Previous day FINDINGS: A portable AP radiograph of the chest was obtained at 0439 hours. The  
patient is on a cardiac monitor. The left subclavian leads are stable. The  
impella is seen in place. The central line is seen in place. The pulmonary  
arterial catheter is seen in position. The patient is status post CABG. There  
are patchy parenchymal pattern opacities in the lungs bilaterally.  The cardiac  
and mediastinal contours and pulmonary vascularity are normal.  The bones and  
 soft tissues are grossly within normal limits. 10/31/19 2000  XR CHEST PORT Final result Impression:  IMPRESSION:  
1. Glidden-Russ catheter projects to terminate in the pulmonary outflow tract. Narrative:  INDICATION: Dylan Cedrick placement COMPARISON: Previous chest xray, earlier same day. LIMITATIONS: Portable technique. Elza Cornelius FINDINGS: Single frontal view of the chest.   
.  
Lines/tubes/surgical: No significant change in the appearance of the Impella and  
right-sided PICC. A right IJ approach Glidden-Russ catheter projects to terminate  
in the main pulmonary outflow tract. Heart/mediastinum: Cardiac assist device does not appear significantly changed. Lungs/pleura: Interstitial prominence throughout the lungs. No visualized  
pleural effusion or pneumothorax. Additional Comments: Cutaneous staples and surgical clips overlying the right  
chest/axilla. .  
  
 10/31/19 0509  XR CHEST PORT Final result Impression:  IMPRESSION: Glidden-Russ catheter tip has been advanced into the right interlobar  
pulmonary artery. There is slight improvement in the interstitial edema pattern. Narrative:  EXAM:  XR CHEST PORT INDICATION:  postop heart COMPARISON:  10/30/2019 FINDINGS: A portable AP radiograph of the chest was obtained at 428 hours. Left  
ventricular assist device is unchanged. AICD is unchanged. Glidden-Russ catheter  
has been advanced into the right interlobar pulmonary artery. PICC catheter is  
unchanged. .  There is slight improvement in the interstitial edema pattern. Elza Cornelius Cardiomegaly is stable. . . Admission Weight: Last Weight Weight: 192 lb 10.9 oz (87.4 kg) Weight: 176 lb 5.9 oz (80 kg) Intake / Shantell Mantel / Drain: 
Current Shift: 11/01 0701 - 11/01 1900 In: 68 [I.V.:76] Out: 80 [Urine:80] Last 24 hrs.:  
 
Intake/Output Summary (Last 24 hours) at 11/1/2019 1023 Last data filed at 11/1/2019 0800 Gross per 24 hour Intake 1390.26 ml Output 1325 ml Net 65.26 ml EXAM: 
General:   NAD Lungs:   Diminished. Incision:  Impella site C/D/I. Heart:  Regular rate and rhythm, S1, S2 normal, no murmur, click, rub or gallop. Abdomen:   Soft, non-tender. Bowel sounds hypoactive. No masses,  No organomegaly. Extremities: Mod edema. PPP. Neurologic:  Gross motor and sensory apparatus intact. Labs:  
Recent Labs 19 
6593 19 
8551 19 
6989 WBC  --  4.3  --   
HGB  --  9.0*  --   
HCT  --  28.8*  --   
PLT  --  90*  --   
NA  --   --  130* K  --   --  4.0  
BUN  --   --  26* CREA  --   --  1.35* GLU  --   --  110* GLUCPOC 144*  --   --   
INR  --  1.1  -- Assessment:  
 
Active Problems: 
  Acute decompensated heart failure ([address] (10/25/2019) Plan/Recommendations/Medical Decision Makin. Acute on chronic systolic (CHF Class IV) S/P Impella: Has AICD. LV EF 16-20%. Cont mil per AHFS. No BB/ACE/ARB/AA until appropriate. Diuretics on Hold. Trend proBNP, lactate, LDH. Strict I/Os. Daily weights. LVAD w/u in process. On ancef for prophylaxis. Resume bival as purge 2. Thrombocytopenia: Platelets 39C. Stop asa. HIT panel sent. Change pepcid to protonix. 3. CAD S/p CABG : Needs [facility], plans for next week w/ Dr. Nadege Mike. Stop asa d/t hematuria, not on statin historically. No BB D/t pressors/intropes. 4. PAF s/p DCCV 2019: Holding eliquis, back to bival as purge On PO amio. 5. CKD stage III: Monitor. Renal following. Diuretics PRN. 6. Hx of urinary retention/BPH:  On flomax. Keep Lizama. Hematuria resolving 7. JOSE: qhs CPAP. 8. Hx of sternal wound infection requring sternectomy: Not a contraindication for future LVAD. Tagged WBC pending. 9. Iron def anemia: s/p venofer. 10. Vocal cord paralysis:  Speech eval PRN. Advance diet as tolerated. 11. Constipation: On pericolace, miralax. KUB ok. Completed reglan x 4 doses. Prn suppository Dispo: Care and orders per AHFS. Remain in CCU. May de line today Signed By: Kathleen Schlatter, NP

## 2019-11-01 NOTE — PROGRESS NOTES
[address] 
  
Education with patient's caregiver. Met with patient. Reviewed with patient: Booklet - \"Living A  More Active Lifestyle with Heartmate 3 LVAD\" , handout with system components and actual system component parts (batteries, battery clips,  and mock loop). Gave patient DVD \"Heartmate 3 Left Ventricular Assist System, Patient Education Program\" to watch with family. Questions asked and answered. patient states he will watch DVD later when wife arrives. 
Hair Moore RN 
RN VAD Coordinator

## 2019-11-02 NOTE — PROGRESS NOTES
[facility] 
 [address] [facility], [address] [facility] Phone: [phone]   [phone]   
  
Nephrology Progress Note Sona Kiser     1950     [id number] Date of Admission : 10/25/2019 
11/02/19 CC:  Follow up for JUAN J , CKD, Hyponatremia Assessment and Plan JUAN J /CKD Stage IV : 
- likely from CRS 
- Cr stable and at baseline 
- CHECK BMP IN AM 
- inotropes ongoing per HF service CKD IV at baseline w/ L renal atrophy: - NM scan shows equal function Chronic Anemia: 
- from CKD and iron deficiency - s/p IV iron 
- continue PAVEL Gross hematuria: 
- likely from angiomax  
- manual irrigation PRN Hyponatremia: 
- from hypervolemia and excessive fluid intake 
- stable 
- he is on D5 VIA Impella WHICH May drop na more 
 
  
BPH w/ urinary retention  
- keep neal for now 
  
Acute on Chronic HFrEF  
- EF 16-20%, NYHA Class IV , hx of VF arrest - s/p AICD 
- Impella insertion 10/29 
- ongoing w/u for LVAD - will need colonoscopy, LHC, PET/CT 
  
JOSE on CPAP  
  
PAF s/p DCCV 6/19 
  
  
 
Interval History:   
Seen and examined. Cr better, He has hematuria 
 c/o sob, No c/o chest pain, No c/o nausea or vomiting No c/o  fever. Review of Systems: Pertinent items are noted in HPI. Current Medications:  
Current Facility-Administered Medications Medication Dose Route Frequency  cefepime (MAXIPIME) 2 g in 0.9% sodium chloride (MBP/ADV) 100 mL  2 g IntraVENous Q12H  
 bumetanide (BUMEX) injection 2 mg  2 mg IntraVENous TID  epoetin yvonne-epbx (RETACRIT) injection 10,000 Units  10,000 Units SubCUTAneous Q TUE, THU & SAT  pantoprazole (PROTONIX) tablet 40 mg  40 mg Oral ACB  dextrose 5% infusion  4-20 mL/hr IntraVENous CONTINUOUS  
 bivalirudin (ANGIOMAX) 250 mg in dextrose 5% 250 mL infusion  4-20 mL/hr Other TITRATE  alteplase (CATHFLO) 1 mg in sterile water (preservative free) 1 mL injection  1 mg InterCATHeter PRN  
  bacitracin 500 unit/gram packet 1 Packet  1 Packet Topical PRN  
 sodium chloride (NS) flush 5-40 mL  5-40 mL IntraVENous Q8H  
 sodium chloride (NS) flush 5-40 mL  5-40 mL IntraVENous PRN  
 0.45% sodium chloride infusion  10 mL/hr IntraVENous CONTINUOUS  
 0.9% sodium chloride infusion  10 mL/hr IntraVENous CONTINUOUS  
 oxyCODONE IR (ROXICODONE) tablet 5 mg  5 mg Oral Q4H PRN  
 oxyCODONE IR (ROXICODONE) tablet 10 mg  10 mg Oral Q4H PRN  
 morphine injection 4 mg  4 mg IntraVENous Q2H PRN  
 naloxone (NARCAN) injection 0.4 mg  0.4 mg IntraVENous PRN  
 mupirocin (BACTROBAN) 2 % ointment   Both Nostrils BID  ondansetron (ZOFRAN) injection 4 mg  4 mg IntraVENous Q4H PRN  
 albuterol (PROVENTIL VENTOLIN) nebulizer solution 2.5 mg  2.5 mg Nebulization Q4H PRN  chlorhexidine (PERIDEX) 0.12 % mouthwash 10 mL  10 mL Oral Q12H  
 magnesium oxide (MAG-OX) tablet 400 mg  400 mg Oral BID  calcium chloride 1 g in 0.9% sodium chloride 250 mL IVPB  1 g IntraVENous PRN  
 bisacodyl (DULCOLAX) suppository 10 mg  10 mg Rectal DAILY PRN  
 senna-docusate (PERICOLACE) 8.6-50 mg per tablet 1 Tab  1 Tab Oral BID  polyethylene glycol (MIRALAX) packet 17 g  17 g Oral DAILY  ELECTROLYTE REPLACEMENT NOTE: Nurse to review Serum Potassium and Magnesuim levels and Initiate Electrolyte Replacement Protocol as needed  1 Each Other PRN  
 magnesium sulfate 1 g/100 ml IVPB (premix or compounded)  1 g IntraVENous PRN  
 glucose chewable tablet 16 g  4 Tab Oral PRN  
 glucagon (GLUCAGEN) injection 1 mg  1 mg IntraMUSCular PRN  
 dextrose 10% infusion 0-250 mL  0-250 mL IntraVENous PRN  
 morphine injection 2 mg  2 mg IntraVENous Q2H PRN  
 melatonin tablet 3 mg  3 mg Oral QHS PRN  
 albumin human 5% (BUMINATE) solution 25 g  25 g IntraVENous Q2H PRN  
 insulin lispro (HUMALOG) injection   SubCUTAneous Q6H  
 [Held by provider] spironolactone (ALDACTONE) tablet 25 mg  25 mg Oral DAILY  milrinone (PRIMACOR) 20 MG/100 ML D5W infusion  0.125 mcg/kg/min IntraVENous CONTINUOUS  
 amiodarone (CORDARONE) tablet 100 mg  100 mg Oral BID  influenza vaccine 2019-20 (6 mos+)(PF) (FLUARIX/FLULAVAL/FLUZONE QUAD) injection 0.5 mL  0.5 mL IntraMUSCular PRIOR TO DISCHARGE  
 [Held by provider] aspirin delayed-release tablet 81 mg  81 mg Oral DAILY  tamsulosin (FLOMAX) capsule 0.8 mg  0.8 mg Oral DAILY  allopurinol (ZYLOPRIM) tablet 100 mg  100 mg Oral DAILY No Known Allergies Objective: 
Vitals:   
Vitals:  
 11/02/19 0700 11/02/19 0800 11/02/19 0900 11/02/19 1000 BP:  109/89 (!) 118/95 99/71 Pulse: 90 98 97 88 Resp: 19 20 22 20 Temp:  99 °F (37.2 °C) SpO2: 91% 93% (!) 83% Weight:  82.9 kg (182 lb 12.2 oz) Height:      
 
Intake and Output: 
No intake/output data recorded. 10/31 1901 - 11/02 0700 In: 1873.6 [I.V.:1873.6] Out: Dia Liz [[id number]] Physical Examination: 
GEN:  NAD NECK:  Supple, no thyromegaly RESP: Coarse  b/l, no  wheezing, CVS:  impella humming + ABDO:  soft , non tender, No mass NEURO: non focal, normal speech EXT: Edema +nt  - neal +, blood + [x]    High complexity decision making was performed 
[]    Patient is at high-risk of decompensation with multiple organ involvement Lab Data Personally Reviewed: I have reviewed all the pertinent labs, microbiology data and radiology studies during assessment. Recent Labs 11/02/19 
3378 11/02/19 0410 11/01/19 
8169 11/01/19 
0419 10/31/19 
0510 NA  --  131*  --  130* 130* K  --  3.9  --  4.0 3.8 CL  --  93*  --  96* 94* CO2  --  32  --  30 32 GLU  --  96  --  110* 97 BUN  --  32*  --  26* 27* CREA  --  1.22  --  1.35* 1.57* CA  --  8.4*  --  8.6 8.4* MG  --   --   --  2.2 2.1 ALB  --  2.6*  --  2.7* 2.9*  
SGOT  --  46*  --  30 34 ALT  --  13  --  7* 12 INR 1.2*  --  1.1  --  2.3* Recent Labs 11/02/19 
0410 11/01/19 
9465 10/31/19 
1059 10/31/19 
0510 WBC 4.6 4.3  --  4.1 HGB 9.1* 9.0* 9.4* 8.9* HCT 28.7* 28.8* 29.5* 28.8* PLT 91* 90*  --  91* No results found for: SDES Lab Results Component Value Date/Time Culture result: GRAM NEGATIVE RODS (A) 10/31/2019 11:05 AM  
 Culture result: CHECK POSSIBLE GRAM POSITIVE COCCI (A) 10/31/2019 11:05 AM  
 Culture result: NO GROWTH 5 DAYS 10/25/2019 07:14 PM  
 Culture result: ENTEROBACTER CLOACAE (A) 06/26/2019 12:25 PM  
 Culture result: NO GROWTH 1 DAY 06/13/2019 10:44 AM  
 
Recent Results (from the past 24 hour(s)) GLUCOSE, POC Collection Time: 11/01/19 12:08 PM  
Result Value Ref Range Glucose (POC) 152 (H) 65 - 100 mg/dL Performed by Rebeca Yang, POC Collection Time: 11/01/19  5:04 PM  
Result Value Ref Range Glucose (POC) 126 (H) 65 - 100 mg/dL Performed by Rebeca Yang, POC Collection Time: 11/01/19 10:23 PM  
Result Value Ref Range Glucose (POC) 121 (H) 65 - 100 mg/dL Performed by Ema Meehan   
POC VENOUS BLOOD GAS Collection Time: 11/02/19  3:49 AM  
Result Value Ref Range Device: NASAL CANNULA Flow rate (POC) 4 L/M  
 pH, venous (POC) 7.420 7.32 - 7.42    
 pCO2, venous (POC) 51.4 (H) 41 - 51 MMHG  
 pO2, venous (POC) 33 25 - 40 mmHg HCO3, venous (POC) 33.3 (H) 23.0 - 28.0 MMOL/L  
 sO2, venous (POC) 63 (L) 65 - 88 % Base excess, venous (POC) 9 mmol/L Allens test (POC) N/A Total resp. rate 16 Site [facility] Specimen type (POC) MIXED VENOUS    
LACTIC ACID Collection Time: 11/02/19  4:10 AM  
Result Value Ref Range Lactic acid 0.9 0.4 - 2.0 MMOL/L  
PROCALCITONIN Collection Time: 11/02/19  4:10 AM  
Result Value Ref Range Procalcitonin 0.7 ng/mL CBC W/O DIFF Collection Time: 11/02/19  4:10 AM  
Result Value Ref Range WBC 4.6 4.1 - 11.1 K/uL  
 RBC 3.16 (L) 4.10 - 5.70 M/uL HGB 9.1 (L) 12.1 - 17.0 g/dL HCT 28.7 (L) 36.6 - 50.3 %  MCV 90.8 80.0 - 99.0 FL  
 MCH 28.8 26.0 - 34.0 PG  
 MCHC 31.7 30.0 - 36.5 g/dL  
 RDW 18.0 (H) 11.5 - 14.5 % PLATELET 91 (L) 020 - 400 K/uL MPV 10.5 8.9 - 12.9 FL  
 NRBC 0.0 0  WBC ABSOLUTE NRBC 0.00 0.00 - 0.01 K/uL METABOLIC PANEL, COMPREHENSIVE Collection Time: 11/02/19  4:10 AM  
Result Value Ref Range Sodium 131 (L) 136 - 145 mmol/L Potassium 3.9 3.5 - 5.1 mmol/L Chloride 93 (L) 97 - 108 mmol/L  
 CO2 32 21 - 32 mmol/L Anion gap 6 5 - 15 mmol/L Glucose 96 65 - 100 mg/dL BUN 32 (H) 6 - 20 MG/DL Creatinine 1.22 0.70 - 1.30 MG/DL  
 BUN/Creatinine ratio 26 (H) 12 - 20 GFR est AA >60 >60 ml/min/1.73m2 GFR est non-AA 59 (L) >60 ml/min/1.73m2 Calcium 8.4 (L) 8.5 - 10.1 MG/DL Bilirubin, total 1.9 (H) 0.2 - 1.0 MG/DL  
 ALT (SGPT) 13 12 - 78 U/L  
 AST (SGOT) 46 (H) 15 - 37 U/L Alk. phosphatase 132 (H) 45 - 117 U/L Protein, total 5.9 (L) 6.4 - 8.2 g/dL Albumin 2.6 (L) 3.5 - 5.0 g/dL Globulin 3.3 2.0 - 4.0 g/dL A-G Ratio 0.8 (L) 1.1 - 2.2 LD Collection Time: 11/02/19  4:10 AM  
Result Value Ref Range  (H) 85 - 241 U/L  
NT-PRO BNP Collection Time: 11/02/19  4:10 AM  
Result Value Ref Range NT pro-BNP 18,099 (H) <125 PG/ML  
PROTHROMBIN TIME + INR Collection Time: 11/02/19  4:12 AM  
Result Value Ref Range INR 1.2 (H) 0.9 - 1.1 Prothrombin time 11.6 (H) 9.0 - 11.1 sec PTT Collection Time: 11/02/19  4:12 AM  
Result Value Ref Range aPTT 33.6 (H) 22.1 - 32.0 sec  
 aPTT, therapeutic range     58.0 - 77.0 SECS  
GLUCOSE, POC Collection Time: 11/02/19  6:55 AM  
Result Value Ref Range Glucose (POC) 93 65 - 100 mg/dL Performed by Minnie Parra Total time spent with patient:  xxx   min. Care Plan discussed with: 
Patient Family RN Consulting Physician [facility],      
 
I have reviewed the flowsheets. Chart and Pertinent Notes have been reviewed. No change in PMH ,family and social history from Consult note.  
 
 
Saman Fuentes MD

## 2019-11-02 NOTE — PROGRESS NOTES
ID Progress Note 
2019 Subjective: No specific complaints today--more significant hematuria noted Objective: Antibiotics: 1. Cefepime Vitals:  
Visit Vitals BP 92/76 (BP 1 Location: Left arm, BP Patient Position: At rest) Pulse 81 Temp 99.8 °F (37.7 °C) Resp 22 Ht 6' 2\" (1.88 m) Wt 82.9 kg (182 lb 12.2 oz) SpO2 95% BMI 23.47 kg/m² Tmax:  Temp (24hrs), Av.3 °F (37.4 °C), Min:98.8 °F (37.1 °C), Max:99.8 °F (37.7 °C) Exam:  Lungs:  clear to auscultation bilaterally Heart:  regular rate and rhythm Abdomen:  soft, non-tender. Bowel sounds normal. No masses,  no organomegaly Grossly bloody urine in catheter Labs:     
Recent Labs 19 
1213 19 
0410 19 
6726 19 
0419 10/31/19 
1059 10/31/19 
0510 WBC  --  4.6 4.3  --   --  4.1 HGB 9.3* 9.1* 9.0*  --  9.4* 8.9*  
PLT  --  91* 90*  --   --  91* BUN  --  32*  --  26*  --  27* CREA  --  1.22  --  1.35*  --  1.57* SGOT  --  46*  --  30  --  34  
AP  --  132*  --  115  --  98  
TBILI  --  1.9*  --  1.6*  --  2.4* Cultures: No results found for: [facility] Lab Results Component Value Date/Time Culture result: SERRATIA MARCESCENS (A) 10/31/2019 11:05 AM  
 Culture result: CHECKING FOR POSSIBLE 2ND GRAM NEGATIVE MAYTE (A) 10/31/2019 11:05 AM  
 Culture result: PROBABLE ENTEROCOCCUS SPECIES (1,000 COLONIES/mL) (A) 10/31/2019 11:05 AM  
 
 
Radiology:  
 
Line/Insert Date:        
 
Assessment:  
 
1. UTI--Serratia from culture plus possible second GNR and small amount of Enterococcus 2. CHF/cardiomyopathy Objective: 1. Continue current therapy 2. Follow up cultures and adjust medications as needed John Saleh MD

## 2019-11-02 NOTE — PROGRESS NOTES
[address] [facility] [address] [facility] in [place], [place] Inpatient Progress Note Patient name: Lonnell Schwab Patient : 1950 Patient MRN: [id number] Attending MD: Lynda Boyd MD 
Date of service: 19 CHIEF COMPLAINT: 
Heart failure PLAN: 
Continue current impella speed at P8; cardiac index near goal 2.1 Continue reduced dose of milrinone 0.125mcg/kg/min; may be able to wean tomorrow Hold diuretics; give bolus 500cc NS and 5% albumin at 50cc/hr today Needs manual MAPs in the absence of A-line due to automated cuffs consistently erroneous No ACE/ARB/ARNi in anticipation of surgery No MAR due to hyponatremia No tolvaptan due to hepatic dysfuction Continue small dose amiodarone for PAF; too advanced for CRT Urinary retention and UTI with hematuria, on antibiotics by ID; consult placed for urology Anticoagulation on hold due to hematuria and epistaxis; afrin, saline and flonase ordered Cannot use octeotride or doxycycline for epistaxis/AVM prophylaxis due to QTc > 580ms Nephrology consult apprecaited Nutritionist consult; okay to bring food from home Pulmonary to review repeat chest CT showing again mild lymphadenopathy, no mass/nodules; has emphysema and will need 6MW to r/o exercise induced hypoxia; won't be able to get DLCO on impella Awaiting remainder of the VAD workup once he recuperates from Impella implant next week, colonoscopy, EGD, LHC; may not be able to fit into PET scan with impella Patient and family understand it will be at least one month that he will be ready for another surgery Ongoing PT/OT/VAD education D/w Dr. Qi Bocanegra IMPRESSION: 
Cardiogenic shock S/p Impella 5.0 implant 10/29/19 by Dr. Maria Del Rosario Magana Acute on chronic systolic heart failure Stage D, NYHA class IV symptoms Non-ischemic cardiomyopathy, LVEF 15%, LVEDD 5.7cm Hyponatremia Liver dysfunction Thrombocytopenia H/o VF arrest s/p ICD 
CAD s/p CABG  C/b sternal wound infection requiring sternectomy CKD PAF s/p DCCV 61/8/19 UTI with GNR Anemia, iron deficiency Cardiac cachexia Vocal cord paralysis CARDIAC IMAGING: 
Tagged WBC negative INTERVAL HISTORY: 
D/w hematology, vW dz workup negative Patient feels very lightheaded standing up, suspect too dry Cardiac index 2.1, CVP 4 PHYSICAL EXAM: 
Visit Vitals BP (!) 02/53 Pulse 85 Temp 99.1 °F (37.3 °C) Resp 17 Ht 6' 2\" (1.88 m) Wt 182 lb 12.2 oz (82.9 kg) SpO2 95% BMI 23.47 kg/m² General: Patient is well developed, well-nourished in no acute distress HEENT: Normocephalic and atraumatic. No scleral icterus. Pupils are equal, round and reactive to light and accomodation. No conjunctival injection. Oropharynx is clear. Neck: Supple. No evidence of thyroid enlargements or lymphadenopathy. JVD: Cannot be appreciated Lungs: Breath sounds are equal and clear bilaterally. No wheezes, rhonchi, or rales. Heart: Regular rate and rhythm with normal S1 and S2. No murmurs, gallops or rubs. Abdomen: Soft, no mass or tenderness. No organomegaly or hernia. Bowel sounds present. Genitourinary and rectal: deferred Extremities: No cyanosis, clubbing, or edema. Neurologic: No focal sensory or motor deficits are noted. Grossly intact. Psychiatric: Awake, alert an doriented x 3. Appropriate mood and affect. Skin: Warm, dry and well perfused. No lesions, nodules or rashes are noted. REVIEW OF SYSTEMS: 
General: Denies fever, night sweats. Ear, nose and throat: Denies difficulty hearing, sinus problems, runny nose, post-nasal drip, ringing in ears, mouth sores, loose teeth, ear pain, nosebleeds, sore throate, facial pain or numbess Cardiovascular: see above in the interval history Respiratory: Denies cough, wheezing, sputum production, hemoptysis.  
Gastrointestinal: Denies heartburn, constipation, intolerance to certain foods, diarrhea, abdominal pain, nausea, vomiting, difficulty swallowing, blood in stool Kidney and bladder: Denies painful urination, frequent urination, urgency, prostate problems and impotence Musculoskeletal: Denies joint pain, muscle weakness Skin and hair: Denies change in existing skin lesions, hair loss or increase, breast changes PAST MEDICAL HISTORY: 
Past Medical History:  
Diagnosis Date  Degenerative disc disease, lumbar  Heart failure ([facility] [address]  High cholesterol  Hypertension  Paroxysmal atrial fibrillation ([facility] [address] 4/2/2019  Spinal stenosis PAST SURGICAL HISTORY: 
Past Surgical History:  
Procedure Laterality Date  HX APPENDECTOMY  HX CORONARY ARTERY BYPASS GRAFT    
 triple  HX HERNIA REPAIR    
 HX IMPLANTABLE CARDIOVERTER DEFIBRILLATOR  CA CARDIOVERSION ELECTIVE ARRHYTHMIA EXTERNAL N/A 6/10/2019 EP CARDIOVERSION performed by Franklyn Lynch MD at [address] [place] [address], [facility] [address] CATH LAB  CA CARDIOVERSION ELECTIVE ARRHYTHMIA EXTERNAL N/A 6/18/2019 EP CARDIOVERSION performed by Chirag Duvall MD at [address] [place] [address], [facility] [address] CATH LAB  CA INSJ/RPLCMT PERM DFB W/TRNSVNS LDS 1/DUAL CHMBR N/A 6/21/2019 INSERT ICD BIV MULTI performed by Ruth Emmanuel MD at [address] [facility], [facility] [address] CATH LAB  CA TCAT IMPL WRLS P-ART PRS SNR L-T HEMODYN MNTR N/A 9/18/2019 IMPLANT HEART FAILURE MONITORING DEVICE performed by Mounika Bernard MD at [address] [place] [address], [facility] [address] CATH LAB FAMILY HISTORY: 
Family History Problem Relation Age of Onset  Lung Disease Mother  Hypertension Mother Hershell Joyce Arthritis-osteo Mother  Heart Disease Mother  Heart Disease Father  Diabetes Father SOCIAL HISTORY: 
Social History Socioeconomic History  Marital status:  Spouse name: Not on file  Number of children: Not on file  Years of education: Not on file  Highest education level: Not on file Tobacco Use  Smoking status: Former Smoker Last attempt to quit: 12/1/2010 Years since quittin.9  Smokeless tobacco: Never Used Substance and Sexual Activity  Alcohol use: Not Currently Comment: rarely  Drug use: Never Other Topics Concern LABORATORY RESULTS: 
  
Labs Latest Ref Rng & Units 2019 2019 2019 10/31/2019 10/31/2019 10/30/2019 10/29/2019 WBC 4.1 - 11.1 K/uL - 4.6 4.3 - 4.1 4. 0(L) 4.4  
RBC 4.10 - 5.70 M/uL - 3.16(L) 3.12(L) - 3.11(L) 3.62(L) 3.42(L) Hemoglobin 12.1 - 17.0 g/dL 9.3(L) 9.1(L) 9.0(L) 9.4(L) 8. 9(L) 10. 6(L) 9.8(L) Hematocrit 36.6 - 50.3 % 29. 7(L) 28. 7(L) 28. 8(L) 29. 5(L) 28. 8(L) 33. 0(L) 31. 3(L) MCV 80.0 - 99.0 FL - 90.8 92.3 - 92.6 91.2 91.5 Platelets 814 - 842 K/uL - 91(L) 90(L) - 91(L) 140(L) 152 Lymphocytes 12 - 49 % - - - - - - - Monocytes 5 - 13 % - - - - - - - Eosinophils 0 - 7 % - - - - - - - Basophils 0 - 1 % - - - - - - - Albumin 3.5 - 5.0 g/dL - 2. 6(L) 2. 7(L) - 2. 9(L) 3. 2(L) 2. 4(L) Calcium 8.5 - 10.1 MG/DL - 8.4(L) 8.6 - 8.4(L) 9.4 8.9 SGOT 15 - 37 U/L - 46(H) 30 - 34 52(H) 48(H) Glucose 65 - 100 mg/dL - 96 110(H) - 97 108(H) 96 BUN 6 - 20 MG/DL - 32(H) 26(H) - 27(H) 28(H) 24(H) Creatinine 0.70 - 1.30 MG/DL - 1.22 1.35(H) - 1.57(H) 1.88(H) 1.59(H) Sodium 136 - 145 mmol/L - 131(L) 130(L) - 130(L) 132(L) 130(L) Potassium 3.5 - 5.1 mmol/L - 3.9 4.0 - 3.8 3. 2(L) 4.1 TSH 0.36 - 3.74 uIU/mL - - - - - - -  
PSA 0.01 - 4.0 ng/mL - - - - - - -  
LDH 85 - 241 U/L - 446(H) 350(H) - 312(H) 352(H) - Some recent data might be hidden Lab Results Component Value Date/Time TSH 2.40 10/25/2019 07:39 PM  
 TSH 2.45 2019 04:16 AM  
 
 
ALLERGY: 
No Known Allergies CURRENT MEDICATIONS: 
 
Current Facility-Administered Medications:  
  cefepime (MAXIPIME) 2 g in 0.9% sodium chloride (MBP/ADV) 100 mL, 2 g, IntraVENous, Q12H, Kentrell Julian MD, Last Rate: 200 mL/hr at 19 0949, 2 g at 19 9149   bumetanide (BUMEX) injection 2 mg, 2 mg, IntraVENous, TID, Shaquille Burgess MD Breanna, 2 mg at 11/02/19 4047   epoetin yvonne-epbx (RETACRIT) injection 10,000 Units, 10,000 Units, SubCUTAneous, Q TUE, THU & SAT, Isha Matson MD, 10,000 Units at 10/31/19 2120   pantoprazole (PROTONIX) tablet 40 mg, 40 mg, Oral, ACB, David DIOR, NP, 40 mg at 11/02/19 3248   dextrose 5% infusion, 4-20 mL/hr, IntraVENous, CONTINUOUS, David DIOR, NP, Last Rate: 6.1 mL/hr at 11/01/19 2210, 6.1 mL/hr at 11/01/19 2210   bivalirudin (ANGIOMAX) 250 mg in dextrose 5% 250 mL infusion, 4-20 mL/hr, Other, TITRATE, Claudell Bernard, NP, Stopped at 11/01/19 2210 
  alteplase (CATHFLO) 1 mg in sterile water (preservative free) 1 mL injection, 1 mg, InterCATHeter, PRN, Claudell Bernard, NP 
  bacitracin 500 unit/gram packet 1 Packet, 1 Packet, Topical, PRN, Claudell Bernard, NP 
  sodium chloride (NS) flush 5-40 mL, 5-40 mL, IntraVENous, Q8H, David DIOR, NP, 10 mL at 11/02/19 5421   sodium chloride (NS) flush 5-40 mL, 5-40 mL, IntraVENous, PRN, Claudell Bernard, NP 
  0.45% sodium chloride infusion, 10 mL/hr, IntraVENous, CONTINUOUS, David DIOR, NP, Last Rate: 10 mL/hr at 10/29/19 1321, 10 mL/hr at 10/29/19 1321 
  0.9% sodium chloride infusion, 10 mL/hr, IntraVENous, CONTINUOUS, Shana Sims NP, Last Rate: 10 mL/hr at 10/31/19 1300, 10 mL/hr at 10/31/19 1300 
  oxyCODONE IR (ROXICODONE) tablet 5 mg, 5 mg, Oral, Q4H PRN, David DIOR, NP, 5 mg at 10/31/19 0127 
  oxyCODONE IR (ROXICODONE) tablet 10 mg, 10 mg, Oral, Q4H PRN, Claudell Bernard, NP, 10 mg at 10/31/19 1847   morphine injection 4 mg, 4 mg, IntraVENous, Q2H PRN, Claudell Bernard, NP 
  [facility]) injection 0.4 mg, 0.4 mg, IntraVENous, PRN, Claudell Bernard, NP 
  mupirocin (BACTROBAN) 2 % ointment, , Both Nostrils, BID, Claudell Bernard, NP 
  ondansetron [facility]) injection 4 mg, 4 mg, IntraVENous, Q4H PRN, David DIOR NP, 4 mg at 10/31/19 1433   albuterol (PROVENTIL VENTOLIN) nebulizer solution 2.5 mg, 2.5 mg, Nebulization, Q4H PRN, Jeremías Negron NP 
  chlorhexidine (PERIDEX) 0.12 % mouthwash 10 mL, 10 mL, Oral, Q12H, Ruth DIOR NP, 10 mL at 11/02/19 0920 
  magnesium oxide (MAG-OX) tablet 400 mg, 400 mg, Oral, BID, Ruth DIOR NP, 400 mg at 11/02/19 6009   calcium chloride 1 g in 0.9% sodium chloride 250 mL IVPB, 1 g, IntraVENous, PRN, Kerrye Credit NP 
  bisacodyl (DULCOLAX) suppository 10 mg, 10 mg, Rectal, DAILY PRN, Jeremías Negron NP 
  senna-docusate (PERICOLACE) 8.6-50 mg per tablet 1 Tab, 1 Tab, Oral, BID, Jeremías Negron NP, 1 Tab at 11/02/19 8351   polyethylene glycol (MIRALAX) packet 17 g, 17 g, Oral, DAILY, Jeremías Negron NP, Stopped at 11/01/19 0900   ELECTROLYTE REPLACEMENT NOTE: Nurse to review Serum Potassium and Magnesuim levels and Initiate Electrolyte Replacement Protocol as needed, 1 Each, Other, PRN, Jeremías Negron, NP 
  magnesium sulfate 1 g/100 ml IVPB (premix or compounded), 1 g, IntraVENous, PRN, Jeremías Negron NP 
  glucose chewable tablet 16 g, 4 Tab, Oral, PRN, Jeremías Negron NP 
  glucagon [place]) injection 1 mg, 1 mg, IntraMUSCular, PRN, Jeremías Negron NP 
  dextrose 10% infusion 0-250 mL, 0-250 mL, IntraVENous, PRN, Jeremías Negron NP 
  morphine injection 2 mg, 2 mg, IntraVENous, Q2H PRN, Jeremías Negron NP 
  melatonin tablet 3 mg, 3 mg, Oral, QHS PRN, Jeremías Negron NP, 3 mg at 10/30/19 2056   albumin human 5% (BUMINATE) solution 25 g, 25 g, IntraVENous, Q2H PRN, Verner Jewel, MD, 25 g at 10/30/19 8222   insulin lispro (HUMALOG) injection, , SubCUTAneous, Q6H, Zainab Andrews MD, 2 Units at 11/02/19 1224   [Held by provider] spironolactone (ALDACTONE) tablet 25 mg, 25 mg, Oral, DAILY, Ruth DIOR NP, Stopped at 10/29/19 0900 
  milrinone (PRIMACOR) 20 MG/100 ML D5W infusion, 0.125 mcg/kg/min, IntraVENous, CONTINUOUS, David Peterson MD, Last Rate: 3.2 mL/hr at 11/02/19 1136, 0.125 mcg/kg/min at 11/02/19 1136 
  amiodarone (CORDARONE) tablet 100 mg, 100 mg, Oral, BID, Lydia Rose Bud D, NP, 100 mg at 11/02/19 0919 
  influenza vaccine 2019-20 (6 mos+)(PF) (FLUARIX/FLULAVAL/FLUZONE QUAD) injection 0.5 mL, 0.5 mL, IntraMUSCular, PRIOR TO DISCHARGE, Umberto Wagner NP 
  [Held by provider] aspirin delayed-release tablet 81 mg, 81 mg, Oral, DAILY, Lydia Rose Bud D, NP, 81 mg at 10/31/19 7210   tamsulosin (FLOMAX) capsule 0.8 mg, 0.8 mg, Oral, DAILY, Lydia Rose Bud D, NP, 0.8 mg at 11/02/19 6787   allopurinol (ZYLOPRIM) tablet 100 mg, 100 mg, Oral, DAILY, Lydia Rose Bud D, NP, 100 mg at 11/02/19 4459 Thank you for allowing me to participate in this patient's care. Samantha Ricci MD PhD 
[facility] [address] [address] [address] [facility], [address] Phone: [phone] Fax: [phone] Critically ill Critical care 45 min

## 2019-11-02 NOTE — PROGRESS NOTES
0730 Bedside and Verbal shift change report given to KATRIN Holley (oncoming nurse) by Janeen Layton RN (offgoing nurse). Report included the following information SBAR, Kardex, Intake/Output, MAR, Recent Results and Cardiac Rhythm paced. 0950 Attempting to stand pt, pt became very weak, SOB & unable to stand 21  Dr. Timoteo Martinez @ bedside - updated on pts status - orders received to irrigate bladder, if unable to clear clots will consult urology; impella to P6 
1045 Dr. Tong Chopra at bedside - updated on pts status - orders received to get orthostatic BPs. repeat EKG, start afrin, saline nasal spray & flonase to help with nose bleed 1100 Manual bladder irrigation completed - pt tolerated well 1130 CI 1.2, SBP 88 - pt unable to stand for orthostatic BPs - Dr. Tong Chopra updated - orders received to increase to P7, hold bumex, 250mL NS bolus & decrease milrinone to 0.125 
1140 CI 1.6 - Dr. Timoteo Martinez at bedside - updated on pts status - orders received to increase to P8 
1145 CI 1.1 - Dr. Tong Chopra at bedside - updated on status - orders received to keep milrinone at 0.125 & check H&H after bolus completed 1300 Hgb 9.3, CI 1.2, BP 75/52 (61), CVP 4-5, PAP 37/19 (28), SVR 1149 - Dr. Tong Chopra updated on status - orders received to check hemisphere for correct height & weight & check manual BP Weight was wrong in hemisphere - corrected weight. CI 2.1, manual BP 98/72 (81), CVP 4-5, SVR 1382 - orders received for 500mL NS bolus, albumin 5% 500mL @ 5mL/hr 
1520 Manual bladder irrigation completed, increase in clot size & quantity - updated Dr. Tong Chopra - orders received for urology consult [phone] w/ Dr. Cristhian Bridges urology - updated on pts status - orders received to replace neal with 22FR 3-way catheter 1730 Manual bladder irrigation completed - pt tolerated well 
1930 Bedside and Verbal shift change report given to Lori Palmer RN (oncoming nurse) by [address], RN (offgoing nurse).  Report included the following information SBAR, Kardex, Intake/Output, MAR, Recent Results and Cardiac Rhythm paced.

## 2019-11-02 NOTE — PROGRESS NOTES
2000: Pt is having intermittent nose bleeds. BP stable. Urine is clear. 2200: pt now having hematuria and continued bleeding from nose. BP remains stable. Impella purge flow down to 6.5. Dr Carmelo Quiñones notified of nose bleed, hematuria, and low purge flow. Orders received to change purge solution from angiomax to dextrose and to give 1 mg TPA purge solution if purge flow 3 or less. Monitor lab work. 0600: pt up to chair. Continuing to have nose bleed and gross hematuria. Purge flow is 4. Bedside shift change report given to Leydi (oncoming nurse) by Sidney Menon (offgoing nurse). Report included the following information SBAR, Kardex, Procedure Summary, Intake/Output, MAR, Recent Results, Med Rec Status and Cardiac Rhythm Paced.

## 2019-11-02 NOTE — PROGRESS NOTES
[facility] ICU Progress Note Admit Date: 10/25/2019 POD:  3 Day Post-Op Procedure:  Procedure(s): RIGHT AXILLARY IMPELLA INSERTION Subjective:  
Pt seen with Dr. Kendrick Alvarenga. On milrinone 0.3. On NC 4L, resting in bed. Afebrile. Impella P8, D5 purge. Nosebleeds/hematuria overnight Objective:  
Vitals: 
Blood pressure (!) 75/52, pulse 88, temperature 99.1 °F (37.3 °C), resp. rate 23, height 6' 2\" (1.88 m), weight 182 lb 12.2 oz (82.9 kg), SpO2 95 %. Temp (24hrs), Av.2 °F (37.3 °C), Min:98.8 °F (37.1 °C), Max:99.5 °F (37.5 °C) Hemodynamics: 
 CO: CO (l/min): 5.7 l/min CI: CI (l/min/m2): 2.7 l/min/m2 CVP: CVP (mmHg): 5 mmHg (19 1500) SVR: SVR (dyne*sec)/cm5: 1382 (dyne*sec)/cm5 (19 1400) PAP Systolic: PAP Systolic: 48 (45/26/14 7342) PAP Diastolic: PAP Diastolic: 19 (55/07/95 1640) PVR:   
 SV02: SVO2 (%): 62 % (19 1500) SCV02: SCVO2 (%): 75 % (10/29/19 1900) EKG/Rhythm:   
NSR Oxygen Therapy: 
Oxygen Therapy O2 Sat (%): 95 % (19 1200) Pulse via Oximetry: 87 beats per minute (19 1500) O2 Device: Nasal cannula (19 1200) O2 Flow Rate (L/min): 4 l/min (19 1200) FIO2 (%): 40 % (10/30/19 0846) CXR:  
CXR Results  (Last 48 hours) 19 0406  XR CHEST PORT Final result Impression:  IMPRESSION:  
Stable interstitial edema pattern. Stable lines and tubes. .  . Narrative:  INDICATION:  postop heart EXAM: Chest single view. COMPARISON: 2019. FINDINGS: A single frontal view of the chest at 0345 hours shows stable  
interstitial infiltrate. AICD from the left, PA catheter from the right, right PICC line all stable. Skin staples over the right axilla stable. .  Impella  
device stable. The heart, mediastinum and pulmonary vasculature are stable with  
cardiomegaly . The bony thorax is unremarkable for age. .  
   
  
 19 0530  XR CHEST PORT Final result Impression:  IMPRESSION: Patchy opacifications throughout the lungs bilaterally without  
change. Narrative:  EXAM: XR CHEST PORT INDICATION: postop heart COMPARISON: Previous day FINDINGS: A portable AP radiograph of the chest was obtained at 0439 hours. The  
patient is on a cardiac monitor. The left subclavian leads are stable. The  
impella is seen in place. The central line is seen in place. The pulmonary  
arterial catheter is seen in position. The patient is status post CABG. There  
are patchy parenchymal pattern opacities in the lungs bilaterally. The cardiac  
and mediastinal contours and pulmonary vascularity are normal.  The bones and  
soft tissues are grossly within normal limits. 10/31/19 2000  XR CHEST PORT Final result Impression:  IMPRESSION:  
1. [place]-Russ catheter projects to terminate in the pulmonary outflow tract. Narrative:  INDICATION: Moshe Reach placement COMPARISON: Previous chest xray, earlier same day. LIMITATIONS: Portable technique. Lilia Tomas FINDINGS: Single frontal view of the chest.   
.  
Lines/tubes/surgical: No significant change in the appearance of the Impella and  
right-sided PICC. A right IJ approach Sun[place]-Russ catheter projects to terminate  
in the main pulmonary outflow tract. Heart/mediastinum: Cardiac assist device does not appear significantly changed. Lungs/pleura: Interstitial prominence throughout the lungs. No visualized  
pleural effusion or pneumothorax. Additional Comments: Cutaneous staples and surgical clips overlying the right  
chest/axilla. .  
  
  
 
 
 
Admission Weight: Last Weight Weight: 192 lb 10.9 oz (87.4 kg) Weight: 182 lb 12.2 oz (82.9 kg) Intake / Output / Drain: 
Current Shift: 11/02 0701 - 11/02 1900 In: 2094.6 [I.V.:1494.6] Out: 1695 [TIDZI:5415] Last 24 hrs.:  
 
Intake/Output Summary (Last 24 hours) at 11/2/2019 5480 Last data filed at 11/2/2019 4835 Gross per 24 hour Intake 3533.25 ml Output 4930 ml Net -1396.75 ml EXAM: 
General:   NAD Lungs:   Diminished. Incision:  Impella dressing C/D/I. Heart:  Regular rate and rhythm, S1, S2 normal, no murmur, click, rub or gallop. Abdomen:   Soft, non-tender. Bowel sounds hypoactive. No masses,  No organomegaly. Extremities: Mod edema. PPP. Neurologic:  Gross motor and sensory apparatus intact. Labs:  
Recent Labs 19 
1213 19 
1211  19 
0412 19 
0410 WBC  --   --   --   --  4.6 HGB 9.3*  --   --   --  9.1*  
HCT 29.7*  --   --   --  28.7*  
PLT  --   --   --   --  91* NA  --   --   --   --  131* K  --   --   --   --  3.9 BUN  --   --   --   --  32* CREA  --   --   --   --  1.22  
GLU  --   --   --   --  96  
GLUCPOC  --  143*   < >  --   --   
INR  --   --   --  1.2*  --   
 < > = values in this interval not displayed. Assessment:  
 
Active Problems: 
  Acute decompensated heart failure ([facility] [address] (10/25/2019) Plan/Recommendations/Medical Decision Makin. Acute on chronic systolic (CHF Class IV) S/P Impella: Has AICD. LV EF 16-20%. Cont mil per AHFS. No BB/ACE/ARB/AA until appropriate. Diuretics on Hold. Trend proBNP, lactate, LDH. Strict I/Os. Daily weights. LVAD w/u in process. On ancef for prophylaxis. Hold bival purge due to hematuria/epistaxis 2. Thrombocytopenia: Platelets 56T. No asa. HIT panel sent. On protonix 3. CAD S/p CABG : Needs [facility], plans for next week w/ Dr. Ammy Marquez. Stop asa d/t hematuria/thrombocytopenia, not on statin historically. No BB D/t pressors/intropes. 4. PAF s/p DCCV 2019: Holding eliquis due to hematuria/epistaxis. On PO amio. 5. CKD stage III: Monitor. Renal following. Diuretics PRN. 6. Hx of urinary retention/BPH:  On flomax. Keep Lizama.  Lizama irrigation, may need urology consult per primary team 
 
7. JOSE: qhs CPAP. 8. Hx of sternal wound infection requring sternectomy: Not a contraindication for future LVAD. Tagged WBC pending. 9. Iron def anemia: s/p venofer. 10. Vocal cord paralysis:  Speech eval PRN. Advance diet as tolerated. 11. Constipation: On pericolace, miralax. KUB ok. Completed reglan x 4 doses. Prn suppository Dispo: Care and orders per AHFS. Remain in CCU. Signed By: MALCOLM Michaud Saw patient, agree with above Risk of morbidity and mortality - high Medical decision making - high complexity 1. Acute on chronic systolic (CHF Class IV) S/P Impella: plan for LVAD 2. Thrombocytopenia: Platelets 18F. No asa. HIT panel sent. On protonix 3. CAD S/p CABG 2010: Needs [facility], plans for next week w/ Dr. Rodriguez Query. Stop asa d/t hematuria/thrombocytopenia, not on statin historically. No BB D/t pressors/intropes. 4. PAF s/p DCCV 6/2019: Holding eliquis due to hematuria/epistaxis. On PO amio. 5. CKD stage III: Monitor. Renal following. Diuretics PRN. 6. Hx of urinary retention/BPH:  On flomax. Keep Lizama. Lizama irrigation, may need urology consult per primary team 
 
7. JOSE: qhs CPAP. 8. Hx of sternal wound infection requring sternectomy: Not a contraindication for future LVAD. Tagged WBC pending. 9. Iron def anemia: s/p venofer. 10. Vocal cord paralysis:  Speech eval PRN. Advance diet as tolerated. 11. Constipation: On pericolace, miralax. KUB ok. Completed reglan x 4 doses. Prn suppository Dispo: Care and orders per AHFS. Remain in CCU.

## 2019-11-02 NOTE — PROGRESS NOTES
Day #2 of Cefepime Indication:  UTI Current regimen:  2 gm IV q24h x 7 days Recent Labs 19 
0410 19 
4296 19 
9390 10/31/19 
0510 WBC 4.6 4.3  --  4.1 CREA 1.22  --  1.35* 1.57* BUN 32*  --  26* 27* Est CrCl: 65 ml/min Temp (24hrs), Av.3 °F (37.4 °C), Min:98.8 °F (37.1 °C), Max:99.5 °F (37.5 °C) Dose adjusted to 2 gm IV q12h for crcl > 60.

## 2019-11-03 NOTE — PROGRESS NOTES
NYHA class IV A/C systolic heart failure Low EF (10%) Inotrope dependent Malnutrition  
LVAD Work-up Continues on Impella support Hgb looks good Platelets a little low Creatinine normal 
 
Bilirubin elevated Other LFTs up a little Lactic acid and LDH look good Pro-calcitonin reasonable NT pro-BNP about the same Impella - flow 4 L/min @ P-8 CXR - mild pulmonary edema Intake/Output Summary (Last 24 hours) at 11/3/2019 1441 Last data filed at 11/3/2019 1300 Gross per 24 hour Intake 2545.97 ml Output 2765 ml Net -219.03 ml Visit Vitals BP 92/74 (BP 1 Location: Left arm, BP Patient Position: At rest) Pulse 92 Temp 99.1 °F (37.3 °C) Resp 15 Ht 6' 2\" (1.88 m) Wt 187 lb 2.7 oz (84.9 kg) SpO2 99% BMI 24.03 kg/m² Risk of morbidity and mortality - high Medical decision making - high complexity Total critical care time - 30 minutes (CPT 67219)

## 2019-11-03 NOTE — PROGRESS NOTES
Bedside shift change report given to Heath Quiñones RN (oncoming nurse) by Jeremiah Edward RN (offgoing nurse). Report included the following information SBAR, Kardex, Procedure Summary, Intake/Output, MAR, Accordion, Cardiac Rhythm Paced, Alarm Parameters  and Pre Procedure Checklist.  
 
2000: Irrigated neal. Flowing appropriately. 0100: Purge flow at 3 started alteplase 1mg through impella. Dropped to 1.8 while changing. 0430: Purge flow above 3.

## 2019-11-03 NOTE — PROGRESS NOTES
0730 Bedside and Verbal shift change report given to KATRIN Holley (oncoming nurse) by Jud Tafoya RN (offgoing nurse). Report included the following information SBAR, Kardex, Intake/Output, MAR, Recent Results and Cardiac Rhythm paced. 0930 Dr. Gabriel Given at bedside - updated on pts status - orders received to d/c milrinone & if hemodynamically stable after 4 hrs okay to pull swan 1130 Manual bladder irrigation completed - pt c/o penile pain during irrigation - prn roxicodone for pain 200 Dr. Jasson Reid at bedside - updated on pts status - will continue manual bladder irrigation prn  
1 Spoke w/ Dr. Gabriel Given - updated on pts hemodynamics - CI: 4 PAP: 47/20 (81) BP: 99/71 (81) SVR: 781 CVP: 6-7 w/ PICC matches SGC - off milrinone x 4 hrs - orders received to d/c SGC but to leave MAC for now Ebonieshamarrema removed - pt tolerated well 
1930 Bedside and Verbal shift change report given to Jud Tafoya RN (oncoming nurse) by Cass Stevens RN (offgoing nurse). Report included the following information SBAR, Kardex, Intake/Output, MAR, Recent Results and Cardiac Rhythm paced.

## 2019-11-03 NOTE — PROGRESS NOTES
ID Progress Note 
11/3/2019 Subjective: No specific complaints today--seems to be doing ok Objective: Antibiotics: 1. Cefepime Vitals:  
Visit Vitals BP 92/74 (BP 1 Location: Left arm, BP Patient Position: At rest) Pulse 92 Temp 99.1 °F (37.3 °C) Resp 15 Ht 6' 2\" (1.88 m) Wt 84.9 kg (187 lb 2.7 oz) SpO2 99% BMI 24.03 kg/m² Tmax:  Temp (24hrs), Av.7 °F (37.1 °C), Min:97.9 °F (36.6 °C), Max:99.8 °F (37.7 °C) Exam:  Lungs:  clear to auscultation bilaterally Heart:  regular rate and rhythm Abdomen:  soft, non-tender. Bowel sounds normal. No masses,  no organomegaly Grossly bloody urine in catheter Labs:     
Recent Labs 19 
0527 19 
1213 19 
0410 19 
9995 19 
9119 WBC 4.9  --  4.6 4.3  --   
HGB 8.4* 9.3* 9.1* 9.0*  --   
PLT 81*  --  91* 90*  --   
BUN 28*  --  32*  --  26* CREA 1.16  --  1.22  --  1.35* SGOT 55*  --  46*  --  30  
*  --  132*  --  115 TBILI 2.3*  --  1.9*  --  1.6* Cultures: No results found for: [facility] Lab Results Component Value Date/Time Culture result: SERRATIA MARCESCENS (A) 10/31/2019 11:05 AM  
 Culture result: CHECKING FOR POSSIBLE 2ND GRAM NEGATIVE MAYTE (A) 10/31/2019 11:05 AM  
 Culture result: PROBABLE ENTEROCOCCUS SPECIES (1,000 COLONIES/mL) (A) 10/31/2019 11:05 AM  
 
 
Radiology:  
 
Line/Insert Date:        
 
Assessment:  
 
1. UTI--Serratia from culture plus possible second GNR and small amount of Enterococcus 2. CHF/cardiomyopathy Objective: 1. Continue current therapy 2. Follow up cultures and adjust medications as needed Debby Baumann MD

## 2019-11-03 NOTE — PROGRESS NOTES
[facility] ICU Progress Note Admit Date: 10/25/2019 POD:  3 Day Post-Op Procedure:  Procedure(s): RIGHT AXILLARY IMPELLA INSERTION Subjective:  
Pt seen with Dr. Salina Chen. On milrinone 0.3. On NC 4L, resting in bed. Afebrile. Impella P8, D5 purge. Lizama upsized yesterday. Nosebleeds/hematuria improving Objective:  
Vitals: 
Blood pressure 100/72, pulse 85, temperature 97.9 °F (36.6 °C), resp. rate 22, height 6' 2\" (1.88 m), weight 187 lb 2.7 oz (84.9 kg), SpO2 97 %. Temp (24hrs), Av.9 °F (37.2 °C), Min:97.9 °F (36.6 °C), Max:99.8 °F (37.7 °C) Hemodynamics: 
 CO: CO (l/min): 9.3 l/min CI: CI (l/min/m2): 4.5 l/min/m2 CVP: CVP (mmHg): 11 mmHg (19) SVR: SVR (dyne*sec)/cm5: 831 (dyne*sec)/cm5 (19 0400) PAP Systolic: PAP Systolic: 51 (97/52/79 4087) PAP Diastolic: PAP Diastolic: 22 (55/38/44 9025) PVR:   
 SV02: SVO2 (%): 57 % (19 07) SCV02: SCVO2 (%): 75 % (10/29/19 1900) EKG/Rhythm:   
NSR Oxygen Therapy: 
Oxygen Therapy O2 Sat (%): 97 % (19 07) Pulse via Oximetry: 87 beats per minute (19 07) O2 Device: Nasal cannula (19 040) O2 Flow Rate (L/min): 4 l/min (19 0000) FIO2 (%): 40 % (10/30/19 0846) CXR:  
CXR Results  (Last 48 hours) 19 0426  XR CHEST PORT Final result Impression:  Impression: No significant interval change. Narrative:  Chest portable AP History: Postop heart Comparison: 2019 Findings: Cardiac monitoring leads overlie the chest. A left subclavian  
pacemaker is in place. A right IJ and patella device is unchanged. Right PICC  
line and right subclavian lines unchanged. The patient is status post median  
sternotomy. The heart is mildly enlarged, but unchanged. There is unchanged mild  
edema. No focal consolidation, pleural effusion, or pneumothorax. 19 0406  XR CHEST PORT Final result  Impression:  IMPRESSION:  
 Stable interstitial edema pattern. Stable lines and tubes. .  . Narrative:  INDICATION:  postop heart EXAM: Chest single view. COMPARISON: 2019. FINDINGS: A single frontal view of the chest at 0345 hours shows stable  
interstitial infiltrate. AICD from the left, PA catheter from the right, right PICC line all stable. Skin staples over the right axilla stable. .  Impella  
device stable. The heart, mediastinum and pulmonary vasculature are stable with  
cardiomegaly . The bony thorax is unremarkable for age. .  
   
  
  
 
 
 
Admission Weight: Last Weight Weight: 192 lb 10.9 oz (87.4 kg) Weight: 187 lb 2.7 oz (84.9 kg) Intake / Output / Drain: 
Current Shift: No intake/output data recorded. Last 24 hrs.:  
 
Intake/Output Summary (Last 24 hours) at 11/3/2019 9403 Last data filed at 11/3/2019 0600 Gross per 24 hour Intake 2831.03 ml Output 3545 ml Net -713.97 ml EXAM: 
General:   NAD Lungs:   Diminished. Incision:  Impella dressing C/D/I. Heart:  Regular rate and rhythm, S1, S2 normal, no murmur, click, rub or gallop. Abdomen:   Soft, non-tender. Bowel sounds hypoactive. No masses,  No organomegaly. Extremities: Mod edema. PPP. Neurologic:  Gross motor and sensory apparatus intact. Labs:  
Recent Labs 19 
7130 19 
0856 WBC  --  4.9 HGB  --  8.4* HCT  --  27.1*  
PLT  --  81* NA  --  132* K  --  3.9 BUN  --  28* CREA  --  1.16  
GLU  --  87 GLUCPOC 100  --   
INR  --  1.1 Assessment:  
 
Active Problems: 
  Acute decompensated heart failure ([facility] [address] (10/25/2019) Plan/Recommendations/Medical Decision Makin. Acute on chronic systolic (CHF Class IV) S/P Impella: Has AICD. LV EF 16-20%. Cont mil per AHFS. No BB/ACE/ARB/AA until appropriate.   Diuretics on Hold. Trend proBNP, lactate, LDH. Strict I/Os. Daily weights. LVAD w/u in process. On ancef for prophylaxis. Hold bival purge due to hematuria/epistaxis 2. Thrombocytopenia: Platelets 45G. No asa. HIT panel negative. On protonix 3. CAD S/p CABG 2010: Needs [facility], plans for next week w/ Dr. Jannie Peralta. Stop asa d/t hematuria/thrombocytopenia, not on statin historically. No BB D/t pressors/intropes. 4. PAF s/p DCCV 6/2019: Holding eliquis due to hematuria/epistaxis. On PO amio. 5. CKD stage III: Monitor. Renal following. Diuretics PRN. 6. Hx of urinary retention/BPH:  On flomax. Keep Lizama. 7. JOSE: qhs CPAP. 8. Hx of sternal wound infection requring sternectomy: Not a contraindication for future LVAD. Tagged WBC pending. 9. Iron def anemia: s/p venofer. 10. Vocal cord paralysis: Voice improving. Speech eval PRN. Advance diet as tolerated. 11. Constipation: On pericolace, miralax. KUB ok. Completed reglan x 4 doses. Prn suppository 12. Serratia UTI/hematuria: Abx per AHFS. Lizama irrigation, urology following. Dispo: Care and orders per AHFS. Remain in CCU. Signed By: MALCOLM Nguyen Saw patient, agree with above Risk of morbidity and mortality - high Medical decision making - high complexity 1. Acute on chronic systolic (CHF Class IV) S/P Impella: plan for LVAD 2. Thrombocytopenia: Platelets 01B. No asa. HIT panel sent. On protonix 3. CAD S/p CABG 2010: Needs [facility], plans for next week w/ Dr. Jannie Peralta. Stop asa d/t hematuria/thrombocytopenia, not on statin historically. No BB D/t pressors/intropes. 4. PAF s/p DCCV 6/2019: Holding eliquis due to hematuria/epistaxis. On PO amio. 5. CKD stage III: Monitor. Renal following. Diuretics PRN. 6. Hx of urinary retention/BPH:  On flomax. Keep Lizama. Lizama irrigation, may need urology consult per primary team 
 
7. JOSE: qhs CPAP. 8. Hx of sternal wound infection requring sternectomy: Not a contraindication for future LVAD. Tagged WBC pending. 9. Iron def anemia: s/p venofer. 10. Vocal cord paralysis:  Speech eval PRN. Advance diet as tolerated. 11. Constipation: On pericolace, miralax. KUB ok. Completed reglan x 4 doses. Prn suppository Dispo: Care and orders per AHFS. Remain in CCU.

## 2019-11-03 NOTE — CONSULTS
Urology Consult Patient: Justice Weems MRN: [id number]  SSN: [id number] YOB: 1950  Age: 71 y.o. Sex: male Date of Consultation:  November 2, 2019 Requesting Physician: Kendall Govea MD 
Reason for Consultation:  hematuria History of Present Illness:  Justice Weems is a 71 y.o. male admitted 10/25/2019 to the hospital for Acute decompensated heart failure ([facility] [I50.9]. He had an LVAD procedure on 10/30. He has reportedly had a neal since the procedure. Nursing reports that he had hematuria earlier this week that resolved and then started again last night. It was severe last night and this morning with clots. His neal was changed to a 22 Fr 3-way and clots were irrigated out. Urine is now light cherry. He was on angiomax but that is being held. He denies pain. He may have seen a urologist previously but is unsure who. He also has UTI with Serratia, possible second GNR and enterococcus. He had a CT on 10/25 showing moderate bladder distension with diverticula. He is on flomax. Past Medical History:  
Diagnosis Date  Degenerative disc disease, lumbar  Heart failure ([facility] [address]  High cholesterol  Hypertension  Paroxysmal atrial fibrillation ([facility] [address] 4/2/2019  Spinal stenosis Past Surgical History:  
Procedure Laterality Date  HX APPENDECTOMY  HX CORONARY ARTERY BYPASS GRAFT    
 triple  HX HERNIA REPAIR    
 HX IMPLANTABLE CARDIOVERTER DEFIBRILLATOR  [place] CARDIOVERSION ELECTIVE ARRHYTHMIA EXTERNAL N/A 6/10/2019 EP CARDIOVERSION performed by Jennifer Fisher MD at [address] CATH LAB  [place] CARDIOVERSION ELECTIVE ARRHYTHMIA EXTERNAL N/A 6/18/2019 EP CARDIOVERSION performed by Ashley Fowler MD at [address] CATH LAB  MN INSJ/RPLCMT PERM DFB W/TRNSVNS LDS 1/DUAL CHMBR N/A 6/21/2019  INSERT ICD BIV MULTI performed by Marissa Matos MD at [address] CATH LAB  
  AL TCAT IMPL WRLS P-ART PRS SNR L-T HEMODYN MNTR N/A 2019 IMPLANT HEART FAILURE MONITORING DEVICE performed by Maikel Lawrence MD at [address], [facility] CATH LAB No Known Allergies Prior to Admission medications Medication Sig Start Date End Date Taking? Authorizing Provider  
apixaban (ELIQUIS) 5 mg tablet Take 5 mg by mouth two (2) times a day. Yes Provider, Historical  
amiodarone (CORDARONE) 200 mg tablet TAKE 1 TABLET TWICE A DAY 10/29/19   Irvin Altman MD  
escitalopram oxalate (LEXAPRO) 5 mg tablet TAKE 1 TABLET BY MOUTH EVERY DAY 10/10/19   Provider, Historical  
torsemide (DEMADEX) 20 mg tablet Take 3 Tabs by mouth two (2) times a day. 10/2/19   Soledad Soas MD  
tamsulosin (FLOMAX) 0.4 mg capsule Take 0.4 mg by mouth daily. 19   Provider, Historical  
carvedilol (COREG) 6.25 mg tablet Take 1 Tab by mouth two (2) times daily (with meals). 19   Soledad Sosa MD  
milrinone (PRIMACOR) 20 mg/100 mL (200 mcg/mL) infusion 18.14 mcg/min by IntraVENous route continuous. 19   Lianet Montes MD  
aspirin delayed-release 81 mg tablet Take 81 mg by mouth daily. Provider, Historical  
spironolactone (ALDACTONE) 25 mg tablet Take 25 mg by mouth daily. Provider, Historical  
 
 
Social History Tobacco Use  Smoking status: Former Smoker Last attempt to quit: 2010 Years since quittin.9  Smokeless tobacco: Never Used Substance Use Topics  Alcohol use: Not Currently Comment: rarely Family History Problem Relation Age of Onset  Lung Disease Mother  Hypertension Mother Ruel Scott Arthritis-osteo Mother  Heart Disease Mother  Heart Disease Father  Diabetes Father ROS:  Admission ROS from 10/25/2019 was reviewed and changes (other than per HPI) include: none. Physical Exam: 
 
Vital Signs:  Temp (24hrs), Av.2 °F (37.3 °C), Min:98.8 °F (37.1 °C), Max:99.8 °F (37.7 °C) Blood pressure 100/78, pulse 92, temperature 99.8 °F (37.7 °C), resp. rate 18, height 6' 2\" (1.88 m), weight 82.9 kg (182 lb 12.2 oz), SpO2 95 %. I&O's:  No intake/output data recorded. Appearance: well-developed NAD Conjunctiva/Lids: conjunctivae and lids normal  
External Ears/Nose: normal no lesions or deformities Neck: supple Respiratory Effort: breathing easily Lower Extremity: no edema Abdomen/Flank: soft non-tender without masses; no CVA tenderness Liver/Spleen: no organomegaly Hernia: no ventral hernia :  Lizama in place draining light cherry urine, no clots Skin Inspection: warm and dry Mood/Affect: normal 
 
 
Lab Review: CBC Recent Labs 11/02/19 
1213 11/02/19 0410 11/01/19 
0608  10/31/19 
0510 WBC  --  4.6 4.3  --  4.1 HGB 9.3* 9.1* 9.0*   < > 8.9* HCT 29.7* 28.7* 28.8*   < > 28.8* PLT  --  91* 90*  --  91*  
 < > = values in this interval not displayed. CMP Recent Labs 11/02/19 
0410 11/01/19 
0419 10/31/19 
0510 * 130* 130*  
K 3.9 4.0 3.8 CL 93* 96* 94* CO2 32 30 32 GLU 96 110* 97 BUN 32* 26* 27* CREA 1.22 1.35* 1.57* CA 8.4* 8.6 8.4* MG  --  2.2 2.1 ALB 2.6* 2.7* 2.9* TBILI 1.9* 1.6* 2.4* SGOT 46* 30 34 ALT 13 7* 12 Other Recent Labs 11/02/19 
3270 11/01/19 
1818 10/31/19 
0510 INR 1.2* 1.1 2.3*  
 
 
UA:  
Lab Results Component Value Date/Time  Color DARK YELLOW 10/31/2019 11:00 AM  
 Appearance CLEAR 10/31/2019 11:00 AM  
 Specific gravity 1.015 10/31/2019 11:00 AM  
 Specific gravity <1.005 10/25/2019 05:30 PM  
 pH (UA) 6.0 10/31/2019 11:00 AM  
 Protein >300 (A) 10/31/2019 11:00 AM  
 Glucose 100 (A) 10/31/2019 11:00 AM  
 Ketone NEGATIVE  10/31/2019 11:00 AM  
 Bilirubin NEGATIVE  10/25/2019 05:30 PM  
 Urobilinogen 2.0 (H) 10/31/2019 11:00 AM  
 Nitrites NEGATIVE  10/31/2019 11:00 AM  
 Leukocyte Esterase TRACE (A) 10/31/2019 11:00 AM  
 Epithelial cells FEW 10/31/2019 11:00 AM  
 Bacteria NEGATIVE  10/31/2019 11:00 AM  
 WBC 10-20 10/31/2019 11:00 AM  
 RBC >100 (H) 10/31/2019 11:00 AM  
 
 
Cultures:   
 
Imaging:   
 
Assessment:  
 
Active Problems: 
  Acute decompensated heart failure ([facility] [address] (10/25/2019) Plan: 1. Gross hematuria likely due to UTI, neal, AC. His catheter has been upsized - hand irrigate PRN for clots. UTI is being treated. AC being held. Hematuria should improve with conservative management. If persistent we can consider CBI or cysto with fulguration. Continue flomax. If he improves, the neal can be removed when otherwise appropriate and would recommend outpt f/u and consider cysto for further evaluation. Signed By: Salina Cabral MD  - November 2, 2019

## 2019-11-03 NOTE — PROGRESS NOTES
Progress Note Patient: Martha Gallagher MRN: [id number]  SSN: [id number] YOB: 1950  Age: 71 y.o. Sex: male ADMITTED:  10/25/2019 to Yue Dalal MD  for [facility] decomp[facility] [I50.9] Martha Gallagher was admitted for Acute decompensated heart failure ([facility] [address] [I50.9]. He has had some intermittent discomfort when the catheter doesn't drain. Nursing reports they irrigated the catheter twice. He is currently comfortable. Vitals:  Temp (24hrs), Av.7 °F (37.1 °C), Min:97.9 °F (36.6 °C), Max:99.8 °F (37.7 °C) Blood pressure 92/74, pulse 92, temperature 99.1 °F (37.3 °C), resp. rate 15, height 6' 2\" (1.88 m), weight 84.9 kg (187 lb 2.7 oz), SpO2 99 %. I&O's:  1901 -  07 In: 4297.4 [P.O.:120; I.V.:3477.4] Out: Olgajordin Metzger [[facility]:[id number]]  07 - 1900 In: 484.9 [I.V.:134.9] Out: [address] Exam:   NAD. Neal draining light cherry urine Labs:  
Recent Labs 19 
8710 19 
1213 19 
0410 19 
0459 WBC 4.9  --  4.6 4.3 HGB 8.4* 9.3* 9.1* 9.0*  
HCT 27.1* 29.7* 28.7* 28.8* PLT 81*  --  91* 90* Recent Labs 19 
2684 19 
0410 19 
4796 * 131* 130*  
K 3.9 3.9 4.0  
CL 97 93* 96* CO2 32 32 30 GLU 87 96 110* BUN 28* 32* 26* CREA 1.16 1.22 1.35* CA 8.4* 8.4* 8.6 Cultures:     
Imaging:    
 
Assessment: - Active Problems: 
  Acute decompensated heart failure ([facility] [address] (10/25/2019) Gross hematuria UTI Plan:  
 
- Continue conservative management for gross hematuria with intermittent irrigation as needed. If he has persistent obstruction, we may consider CBI but would like to avoid any procedures given recent cardiac history. He is on appropriate abx for UTI. Continue flomax.   When the hematuria improves, the neal can be removed when otherwise appropriate and would recommend outpt f/u and consider cysto for further evaluation. 
  
 
Signed By: Sebas Garrison MD - November 3, 2019

## 2019-11-03 NOTE — PROGRESS NOTES
[address] [place] [address] in [place], [place] Inpatient Progress Note Patient name: Aydee Diaz Patient : 1950 Patient MRN: [id number] Attending MD: Soledad Sosa MD 
Date of service: 19 CHIEF COMPLAINT: 
Cardiogenic shock 
  
PLAN: 
Continue current impella speed at P8; cannot tolerate lower speeds; cardiac index at goal 
Discontinue milrinone and remove SGC 4 hours later if hemodynamics at goal and okay with CTS Start bumex 1mg IV every 6 hours for CVP > 10; renal consult appreciated Transduce CVP off PICC to see if concordant with swan numbers Needs manual MAPs once daily in AM (automated cuffs consistently erroneous) No ACE/ARB/ARNi in anticipation of surgery No MAR due to hyponatremia No tolvaptan due to hepatic dysfuction Continue small dose amiodarone for PAF; too advanced for CRT Anemia and thrombocytopenia likely due to hematuria and hemolysis; check H/H this PM 
Urinary retention and Serratia UTI with hematuria, on antibiotics by ID Urology consult appreciated Anticoagulation and ASA on hold due to hematuria and epistaxis; afrin 2/3 day, saline and flonase ordered Cannot use octeotride or doxycycline for epistaxis/AVM prophylaxis due to QTc > 580ms Nutritionist consult; okay to bring food from home; working on nutrition, prealbumins weekly Pulmonary to review repeat chest CT showing again mild lymphadenopathy, no mass/nodules; has emphysema and will need 6MW to r/o exercise induced hypoxia; won't be able to get DLCO on impella Awaiting remainder of the VAD workup once he recuperates from Impella implant next week, colonoscopy, EGD, LHC; may not be able to fit into PET scan with impella Patient and family understand it will be at least one month that he will be ready for another surgery Ongoing PT/OT/VAD education 
  
IMPRESSION: 
Cardiogenic shock S/p Impella 5.0 implant 10/29/19 by Dr. Benjamín Kelly Acute on chronic systolic heart failure Stage D, NYHA class IV symptoms Non-ischemic cardiomyopathy, LVEF 15%, LVEDD 5.7cm Hyponatremia Liver dysfunction Thrombocytopenia H/o VF arrest s/p ICD 
CAD s/p CABG 2010 C/b sternal wound infection requiring sternectomy CKD PAF s/p DCCV 61/8/19 UTI with GNR Anemia, iron deficiency Cardiac cachexia Vocal cord paralysis Epistaxis 
  
CARDIAC IMAGING: 
Tagged WBC negative 
  INTERVAL HISTORY: 
Doing well \"best he has ever felt\" Voice horseness much improved Needed alteplase yesterday MAPS at goal 
CVP around 6 Cardiac index 4.5 PA 50s/20s I/O net negative 714cc, urine 3.5 liters HGB 8.4 from 9.3 PLT 91 
UA with serratia PHYSICAL EXAM: 
Visit Vitals /90 Pulse 96 Temp 97.9 °F (36.6 °C) Resp 17 Ht 6' 2\" (1.88 m) Wt 187 lb 2.7 oz (84.9 kg) SpO2 97% BMI 24.03 kg/m² General: Patient is well developed, well-nourished in no acute distress HEENT: Normocephalic and atraumatic. No scleral icterus. Pupils are equal, round and reactive to light and accomodation. No conjunctival injection. Oropharynx is clear. Neck: Supple. No evidence of thyroid enlargements or lymphadenopathy. JVD: Cannot be appreciated Lungs: Breath sounds are equal and clear bilaterally. No wheezes, rhonchi, or rales. Heart: Regular rate and rhythm with normal S1 and S2. No murmurs, gallops or rubs. Abdomen: Soft, no mass or tenderness. No organomegaly or hernia. Bowel sounds present. Genitourinary and rectal: deferred Extremities: No cyanosis, clubbing, or edema. Neurologic: No focal sensory or motor deficits are noted. Grossly intact. Psychiatric: Awake, alert an doriented x 3. Appropriate mood and affect. Skin: Warm, dry and well perfused. No lesions, nodules or rashes are noted. REVIEW OF SYSTEMS: 
General: Denies fever, night sweats.  
Ear, nose and throat: Denies difficulty hearing, sinus problems, runny nose, post-nasal drip, ringing in ears, mouth sores, loose teeth, ear pain, nosebleeds, sore throate, facial pain or numbess Cardiovascular: see above in the interval history Respiratory: Denies cough, wheezing, sputum production, hemoptysis. Gastrointestinal: Denies heartburn, constipation, intolerance to certain foods, diarrhea, abdominal pain, nausea, vomiting, difficulty swallowing, blood in stool Kidney and bladder: Denies painful urination, frequent urination, urgency, prostate problems and impotence Musculoskeletal: Denies joint pain, muscle weakness Skin and hair: Denies change in existing skin lesions, hair loss or increase, breast changes PAST MEDICAL HISTORY: 
Past Medical History:  
Diagnosis Date  Degenerative disc disease, lumbar  Heart failure ([facility] [address]  High cholesterol  Hypertension  Paroxysmal atrial fibrillation ([facility] [address] [facility] 4/2/2019  Spinal stenosis PAST SURGICAL HISTORY: 
Past Surgical History:  
Procedure Laterality Date  HX APPENDECTOMY  HX CORONARY ARTERY BYPASS GRAFT    
 triple  HX HERNIA REPAIR    
 HX IMPLANTABLE CARDIOVERTER DEFIBRILLATOR  [place] CARDIOVERSION ELECTIVE ARRHYTHMIA EXTERNAL N/A 6/10/2019 EP CARDIOVERSION performed by Carrol Barker MD at [address] CATH LAB  [place] CARDIOVERSION ELECTIVE ARRHYTHMIA EXTERNAL N/A 6/18/2019 EP CARDIOVERSION performed by Bina Ramírez MD at [address] CATH LAB  [place] INSJ/RPLCMT PERM DFB W/TRNSVNS LDS 1/DUAL CHMBR N/A 6/21/2019 INSERT ICD BIV MULTI performed by Antonio Darden MD at [address] CATH LAB  [place] TCAT IMPL WRLS P-ART PRS SNR L-T HEMODYN MNTR N/A 9/18/2019 IMPLANT HEART FAILURE MONITORING DEVICE performed by Irene Coleman MD at [address] [address] CATH LAB FAMILY HISTORY: 
Family History Problem Relation Age of Onset  Lung Disease Mother  Hypertension Mother Vertie Amis Arthritis-osteo Mother  Heart Disease Mother  Heart Disease Father  Diabetes Father SOCIAL HISTORY: 
Social History Socioeconomic History  Marital status:  Spouse name: Not on file  Number of children: Not on file  Years of education: Not on file  Highest education level: Not on file Tobacco Use  Smoking status: Former Smoker Last attempt to quit: 2010 Years since quittin.9  Smokeless tobacco: Never Used Substance and Sexual Activity  Alcohol use: Not Currently Comment: rarely  Drug use: Never Other Topics Concern LABORATORY RESULTS: 
  
Labs Latest Ref Rng & Units 11/3/2019 2019 2019 2019 10/31/2019 10/31/2019 10/30/2019 WBC 4.1 - 11.1 K/uL 4.9 - 4.6 4.3 - 4.1 4. 0(L) RBC 4.10 - 5.70 M/uL 2.92(L) - 3.16(L) 3.12(L) - 3.11(L) 3.62(L) Hemoglobin 12.1 - 17.0 g/dL 8.4(L) 9.3(L) 9.1(L) 9.0(L) 9.4(L) 8. 9(L) 10. 6(L) Hematocrit 36.6 - 50.3 % 27. 1(L) 29. 7(L) 28. 7(L) 28. 8(L) 29. 5(L) 28. 8(L) 33. 0(L) MCV 80.0 - 99.0 FL 92.8 - 90.8 92.3 - 92.6 91.2 Platelets 022 - 523 K/uL 81(L) - 91(L) 90(L) - 91(L) 140(L) Lymphocytes 12 - 49 % - - - - - - - Monocytes 5 - 13 % - - - - - - - Eosinophils 0 - 7 % - - - - - - - Basophils 0 - 1 % - - - - - - - Albumin 3.5 - 5.0 g/dL 2. 7(L) - 2. 6(L) 2. 7(L) - 2. 9(L) 3. 2(L) Calcium 8.5 - 10.1 MG/DL 8.4(L) - 8. 4(L) 8.6 - 8.4(L) 9.4 SGOT 15 - 37 U/L 55(H) - 46(H) 30 - 34 52(H) Glucose 65 - 100 mg/dL 87 - 96 110(H) - 97 108(H) BUN 6 - 20 MG/DL 28(H) - 32(H) 26(H) - 27(H) 28(H) Creatinine 0.70 - 1.30 MG/DL 1.16 - 1.22 1.35(H) - 1.57(H) 1.88(H) Sodium 136 - 145 mmol/L 132(L) - 131(L) 130(L) - 130(L) 132(L) Potassium 3.5 - 5.1 mmol/L 3.9 - 3.9 4.0 - 3.8 3. 2(L) TSH 0.36 - 3.74 uIU/mL - - - - - - -  
PSA 0.01 - 4.0 ng/mL - - - - - - -  
LDH 85 - 241 U/L 435(H) - 446(H) 350(H) - 312(H) 352(H) Some recent data might be hidden Lab Results Component Value Date/Time  TSH 2.40 10/25/2019 07:39 PM  
 TSH 2.45 2019 04:16 AM  
 
 
ALLERGY: 
 No Known Allergies CURRENT MEDICATIONS: 
 
Current Facility-Administered Medications:  
  insulin lispro (HUMALOG) injection, , SubCUTAneous, AC&HS, Bogaev, Jonetta Blizzard, MD, Stopped at 11/03/19 0730   cefepime (MAXIPIME) 2 g in 0.9% sodium chloride (MBP/ADV) 100 mL, 2 g, IntraVENous, Q12H, Lela Borges MD, Last Rate: 200 mL/hr at 11/03/19 0938, 2 g at 11/03/19 6296   fluticasone propionate (FLONASE) 50 mcg/actuation nasal spray 2 Spray, 2 Spray, Both Nostrils, DAILY, Dorothea aDiley MD, 2 [place] at 11/02/19 8517   oxymetazoline (AFRIN) 0.05 % nasal spray 2 Spray, 2 Spray, Both Nostrils, BID PRN, Dorothea Dailey MD 
  sodium chloride (OCEAN) 0.65 % nasal squeeze bottle 2 Spray, 2 Spray, Both Nostrils, QID, Dorothea Dailey MD, 2 [place] at 11/02/19 1935   alteplase (CATHFLO) 1 mg in dextrose 5% 50 mL impella purge solution, 1 mg, Other, TITRATE, Tyrone Agee MD, Stopped at 11/03/19 7988   epoetin yvonne-epbx (RETACRIT) injection 10,000 Units, 10,000 Units, SubCUTAneous, Q TUE, THU & SAT, Gama Parker MD, 10,000 Units at 11/02/19 2320   pantoprazole (PROTONIX) tablet 40 mg, 40 mg, Oral, ACB, Glenda Munoz D, NP, 40 mg at 11/03/19 5421   dextrose 5% infusion, 4-20 mL/hr, IntraVENous, CONTINUOUS, Glenda DIOR, NP, Last Rate: 4.9 mL/hr at 11/03/19 0937, 4.9 mL/hr at 11/03/19 5708   bivalirudin (ANGIOMAX) 250 mg in dextrose 5% 250 mL infusion, 4-20 mL/hr, Other, TITRATE, Em Macias NP, Stopped at 11/01/19 2210 
  alteplase (CATHFLO) 1 mg in sterile water (preservative free) 1 mL injection, 1 mg, InterCATHeter, PRN, Em aMcias, NP 
  bacitracin 500 unit/gram packet 1 Packet, 1 Packet, Topical, PRN, Em Macias, NP 
  sodium chloride (NS) flush 5-40 mL, 5-40 mL, IntraVENous, Q8H, Glenda DIOR, NP, 10 mL at 11/03/19 9440   sodium chloride (NS) flush 5-40 mL, 5-40 mL, IntraVENous, PRN, Em Macias, NP 
   0.45% sodium chloride infusion, 10 mL/hr, IntraVENous, CONTINUOUS, Corine DIOR, NP, Last Rate: 10 mL/hr at 10/29/19 1321, 10 mL/hr at 10/29/19 1321 
  0.9% sodium chloride infusion, 10 mL/hr, IntraVENous, CONTINUOUS, Shana Sims NP, Last Rate: 10 mL/hr at 10/31/19 1300, 10 mL/hr at 10/31/19 1300 
  oxyCODONE IR (ROXICODONE) tablet 5 mg, 5 mg, Oral, Q4H PRN, Corine DIOR, NP, 5 mg at 10/31/19 0127 
  oxyCODONE IR (ROXICODONE) tablet 10 mg, 10 mg, Oral, Q4H PRN, Kal Richter NP, 10 mg at 11/02/19 1935   morphine injection 4 mg, 4 mg, IntraVENous, Q2H PRN, Kal Richter NP 
  naloxone [facility]) injection 0.4 mg, 0.4 mg, IntraVENous, PRN, Kal Richter NP 
  ondansetron [facility]) injection 4 mg, 4 mg, IntraVENous, Q4H PRN, Corine DIOR, NP, 4 mg at 10/31/19 1831 
  albuterol (PROVENTIL VENTOLIN) nebulizer solution 2.5 mg, 2.5 mg, Nebulization, Q4H PRN, Kal Richter, NP 
  chlorhexidine (PERIDEX) 0.12 % mouthwash 10 mL, 10 mL, Oral, Q12H, Ayanaocadia Maricao D, NP, 10 mL at 11/03/19 0847 
  magnesium oxide (MAG-OX) tablet 400 mg, 400 mg, Oral, BID, Leocadia Maricao D, NP, 400 mg at 11/03/19 0885   calcium chloride 1 g in 0.9% sodium chloride 250 mL IVPB, 1 g, IntraVENous, PRN, Kal Richter NP 
  bisacodyl (DULCOLAX) suppository 10 mg, 10 mg, Rectal, DAILY PRN, Kal Richter NP 
  senna-docusate (PERICOLACE) 8.6-50 mg per tablet 1 Tab, 1 Tab, Oral, BID, Kal Richter NP, 1 Tab at 11/03/19 8496   polyethylene glycol (MIRALAX) packet 17 g, 17 g, Oral, DAILY, Kal Richter NP, Stopped at 11/01/19 0900   ELECTROLYTE REPLACEMENT NOTE: Nurse to review Serum Potassium and Magnesuim levels and Initiate Electrolyte Replacement Protocol as needed, 1 Each, Other, PRN, Kal Richter NP 
  magnesium sulfate 1 g/100 ml IVPB (premix or compounded), 1 g, IntraVENous, PRN, Kal Richter NP 
   glucose chewable tablet 16 g, 4 Tab, Oral, PRN, Justyna Wolfe NP 
  glucagon [facility]) injection 1 mg, 1 mg, IntraMUSCular, PRN, Justyna Wolfe NP 
  dextrose 10% infusion 0-250 mL, 0-250 mL, IntraVENous, PRN, Justyna Wolfe, NP 
  morphine injection 2 mg, 2 mg, IntraVENous, Q2H PRN, Justyna Wolfe NP 
  melatonin tablet 3 mg, 3 mg, Oral, QHS PRN, Justyna Wolfe NP, 3 mg at 10/30/19 2056   albumin human 5% (BUMINATE) solution 25 g, 25 g, IntraVENous, Q2H PRN, Juan Muller MD, 25 g at 10/30/19 9860   milrinone (PRIMACOR) 20 MG/100 ML D5W infusion, 0.125 mcg/kg/min, IntraVENous, CONTINUOUS, Jamal Stratton MD, Last Rate: 3.2 mL/hr at 11/02/19 1136, 0.125 mcg/kg/min at 11/02/19 1136 
  amiodarone (CORDARONE) tablet 100 mg, 100 mg, Oral, BID, Belen Tee DIOR, NP, 100 mg at 11/03/19 0845   influenza vaccine 2019-20 (6 mos+)(PF) (FLUARIX/FLULAVAL/FLUZONE QUAD) injection 0.5 mL, 0.5 mL, IntraMUSCular, PRIOR TO DISCHARGE, Justyna Wolfe NP 
  [Held by provider] aspirin delayed-release tablet 81 mg, 81 mg, Oral, DAILY, Belen Tee DIOR, NP, 81 mg at 10/31/19 9755   tamsulosin (FLOMAX) capsule 0.8 mg, 0.8 mg, Oral, DAILY, Belen Tee DIOR, NP, 0.8 mg at 11/03/19 7904   allopurinol (ZYLOPRIM) tablet 100 mg, 100 mg, Oral, DAILY, Belen Bold D, NP, 100 mg at 11/03/19 0845 Thank you for allowing me to participate in this patient's care. Joaquin Blackwood MD PhD 
[address] 
[address] [facility], [address] Phone: [phone] Fax: [phone] Critically ill Critical care 40 min

## 2019-11-04 NOTE — PROGRESS NOTES
[facility] [place] at [address], [id number] [place] [address] Road, [address] [place] [address] Mary W: [phone]  F: [phone] Reason for Visit:  
Donny Shaikh is a 71 y.o. male who is seen in consultation at the request of Dr. Zeynep Silva for evaluation of lung cancer, thrombocytopenia and iron deficiency anemia Treatment History: · Erythropoietin · History of Present Illness: The patient is a very pleasant approximately an 80-pack-year history of tobacco consumption who stopped smoking about a decade ago when he had his heart attack. He is done quite well from that perspective. He does use oxygen occasionally at night when he is at home. Recent CT scan suggests that he may very well have a bronchogenic carcinoma with mediastinal lymphadenopathy. Work-up of that process is still in progress and is scheduled for a PET scan after discharge. Patient does have iron deficiency with an iron saturation of 9% has had some problems constipation and with blood in his urine. He will need to have a colonoscopy at some point to rule out a GI source of blood loss. And will end up needing a cystoscopy due to the hematuria and his history of tobacco consumption to rule out a bladder cancer. Patient is doing much better from his heart failure perspective. He says he is been able to diurese a lot of fluid and his breathing is much improved. He is on oxygen at this time and is sitting in a chair and appears to be very comfortable. Platelet count on admission was approximately 175,000 it is now at 91,000. Serotonin releasing test was done today. Patient does have elevated factor VIII and von Willebrand's factors consistent with an acute phase reactant from the stress and trauma that he is been under there is no evidence for a low level and so there is nothing to be concerned about from a von [employer]. 11/4/2019 The patient is doing well he is eating well feels good according to nursing he will be in hospital a month as a try to get him evaluated for the left ventricular assist system. We will plan on seeing him in the office after discharge. Past Medical History:  
Diagnosis Date  Degenerative disc disease, lumbar  Heart failure ([facility]  High cholesterol  Hypertension  Paroxysmal atrial fibrillation ([facility] [address] [facility] 2019  Spinal stenosis Past Surgical History:  
Procedure Laterality Date  HX APPENDECTOMY  HX CORONARY ARTERY BYPASS GRAFT    
 triple  HX HERNIA REPAIR    
 HX IMPLANTABLE CARDIOVERTER DEFIBRILLATOR  [place] CARDIOVERSION ELECTIVE ARRHYTHMIA EXTERNAL N/A 6/10/2019 EP CARDIOVERSION performed by Beulah Garcia MD at [place] [address], [facility] [address] CATH LAB  [place] CARDIOVERSION ELECTIVE ARRHYTHMIA EXTERNAL N/A 2019 EP CARDIOVERSION performed by Liza Evangelista MD at [place] [address], [facility] [address] CATH LAB  [place] INSJ/RPLCMT PERM DFB W/TRNSVNS LDS 1/DUAL CHMBR N/A 2019 INSERT ICD BIV MULTI performed by Imelda Wallace MD at [place] [address], [facility] CATH LAB  [place] TCAT IMPL WRLS P-ART PRS SNR L-T HEMODYN MNTR N/A 2019 IMPLANT HEART FAILURE MONITORING DEVICE performed by Saunrda Camara MD at [place] [address], [facility] [address] CATH LAB Social History Tobacco Use  Smoking status: Former Smoker Last attempt to quit: 2010 Years since quittin.9  Smokeless tobacco: Never Used Substance Use Topics  Alcohol use: Not Currently Comment: rarely Family History Problem Relation Age of Onset  Lung Disease Mother  Hypertension Mother [address] [place] [address] Rashad Arthritis-osteo Mother  Heart Disease Mother  Heart Disease Father  Diabetes Father Current Facility-Administered Medications Medication Dose Route Frequency  insulin lispro (HUMALOG) injection   SubCUTAneous AC&HS  bumetanide (BUMEX) injection 1 mg  1 mg IntraVENous Q6H PRN  
  cefepime (MAXIPIME) 2 g in 0.9% sodium chloride (MBP/ADV) 100 mL  2 g IntraVENous Q12H  
 fluticasone propionate (FLONASE) 50 mcg/actuation nasal spray 2 Spray  2 Spray Both Nostrils DAILY  oxymetazoline (AFRIN) 0.05 % nasal spray 2 Spray  2 Spray Both Nostrils BID PRN  
 sodium chloride (OCEAN) 0.65 % nasal squeeze bottle 2 Spray  2 Spray Both Nostrils QID  alteplase (CATHFLO) 1 mg in dextrose 5% 50 mL impella purge solution  1 mg Other TITRATE  epoetin yvonne-epbx (RETACRIT) injection 10,000 Units  10,000 Units SubCUTAneous Q TUE, THU & SAT  pantoprazole (PROTONIX) tablet 40 mg  40 mg Oral ACB  dextrose 5% infusion  4-20 mL/hr IntraVENous CONTINUOUS  
 bivalirudin (ANGIOMAX) 250 mg in dextrose 5% 250 mL infusion  4-20 mL/hr Other TITRATE  alteplase (CATHFLO) 1 mg in sterile water (preservative free) 1 mL injection  1 mg InterCATHeter PRN  
 bacitracin 500 unit/gram packet 1 Packet  1 Packet Topical PRN  
 sodium chloride (NS) flush 5-40 mL  5-40 mL IntraVENous Q8H  
 sodium chloride (NS) flush 5-40 mL  5-40 mL IntraVENous PRN  
 0.45% sodium chloride infusion  10 mL/hr IntraVENous CONTINUOUS  
 0.9% sodium chloride infusion  10 mL/hr IntraVENous CONTINUOUS  
 oxyCODONE IR (ROXICODONE) tablet 5 mg  5 mg Oral Q4H PRN  
 oxyCODONE IR (ROXICODONE) tablet 10 mg  10 mg Oral Q4H PRN  
 morphine injection 4 mg  4 mg IntraVENous Q2H PRN  
 naloxone (NARCAN) injection 0.4 mg  0.4 mg IntraVENous PRN  
 ondansetron (ZOFRAN) injection 4 mg  4 mg IntraVENous Q4H PRN  
 albuterol (PROVENTIL VENTOLIN) nebulizer solution 2.5 mg  2.5 mg Nebulization Q4H PRN  chlorhexidine (PERIDEX) 0.12 % mouthwash 10 mL  10 mL Oral Q12H  
 magnesium oxide (MAG-OX) tablet 400 mg  400 mg Oral BID  calcium chloride 1 g in 0.9% sodium chloride 250 mL IVPB  1 g IntraVENous PRN  
 bisacodyl (DULCOLAX) suppository 10 mg  10 mg Rectal DAILY PRN  
  senna-docusate (PERICOLACE) 8.6-50 mg per tablet 1 Tab  1 Tab Oral BID  polyethylene glycol (MIRALAX) packet 17 g  17 g Oral DAILY  ELECTROLYTE REPLACEMENT NOTE: Nurse to review Serum Potassium and Magnesuim levels and Initiate Electrolyte Replacement Protocol as needed  1 Each Other PRN  
 magnesium sulfate 1 g/100 ml IVPB (premix or compounded)  1 g IntraVENous PRN  
 glucose chewable tablet 16 g  4 Tab Oral PRN  
 glucagon (GLUCAGEN) injection 1 mg  1 mg IntraMUSCular PRN  
 dextrose 10% infusion 0-250 mL  0-250 mL IntraVENous PRN  
 morphine injection 2 mg  2 mg IntraVENous Q2H PRN  
 melatonin tablet 3 mg  3 mg Oral QHS PRN  
 albumin human 5% (BUMINATE) solution 25 g  25 g IntraVENous Q2H PRN  
 amiodarone (CORDARONE) tablet 100 mg  100 mg Oral BID  influenza vaccine 2019-20 (6 mos+)(PF) (FLUARIX/FLULAVAL/FLUZONE QUAD) injection 0.5 mL  0.5 mL IntraMUSCular PRIOR TO DISCHARGE  
 [Held by provider] aspirin delayed-release tablet 81 mg  81 mg Oral DAILY  tamsulosin (FLOMAX) capsule 0.8 mg  0.8 mg Oral DAILY  allopurinol (ZYLOPRIM) tablet 100 mg  100 mg Oral DAILY No Known Allergies Review of Systems: A complete review of systems was obtained, negative except as described above. Physical Exam:  
 
Visit Vitals BP 92/74 (BP 1 Location: Left arm, BP Patient Position: At rest) Pulse 70 Temp 96.8 °F (36 °C) Resp 16 Ht 6' 2\" (1.88 m) Wt 185 lb 6.5 oz (84.1 kg) SpO2 90% BMI 23.80 kg/m² ECOG PS: 2 General: No distress Eyes: PERRLA, anicteric sclerae HENT: Atraumatic, OP clear Neck: Supple Lymphatic: No cervical, supraclavicular, axillary adenopathy Respiratory: CTAB, normal respiratory effort CV: Normal rate, regular rhythm GI: Soft, nontender, nondistended, no masses, no hepatomegaly, no splenomegaly MS:  Digits without clubbing or cyanosis. Skin: No rashes, ecchymoses, or petechiae. Normal temperature, turgor, and texture. Psych: Alert, oriented, appropriate affect, normal judgment/insight Results:  
 
Lab Results Component Value Date/Time WBC 5.5 11/04/2019 04:11 AM  
 HGB 8.8 (L) 11/04/2019 04:11 AM  
 HCT 28.1 (L) 11/04/2019 04:11 AM  
 PLATELET 86 (L) 50/84/8875 04:11 AM  
 MCV 92.7 11/04/2019 04:11 AM  
 ABS. NEUTROPHILS 3.6 10/26/2019 03:33 PM  
 
Lab Results Component Value Date/Time Sodium 130 (L) 11/04/2019 04:11 AM  
 Potassium 4.2 11/04/2019 04:11 AM  
 Chloride 95 (L) 11/04/2019 04:11 AM  
 CO2 34 (H) 11/04/2019 04:11 AM  
 Glucose 92 11/04/2019 04:11 AM  
 BUN 25 (H) 11/04/2019 04:11 AM  
 Creatinine 1.19 11/04/2019 04:11 AM  
 GFR est AA >60 11/04/2019 04:11 AM  
 GFR est non-AA >60 11/04/2019 04:11 AM  
 Calcium 8.7 11/04/2019 04:11 AM  
 Glucose (POC) 98 11/04/2019 06:57 AM  
 
Lab Results Component Value Date/Time Bilirubin, total 1.9 (H) 11/04/2019 04:11 AM  
 ALT (SGPT) 23 11/04/2019 04:11 AM  
 AST (SGOT) 50 (H) 11/04/2019 04:11 AM  
 Alk. phosphatase 146 (H) 11/04/2019 04:11 AM  
 Protein, total 5.8 (L) 11/04/2019 04:11 AM  
 Albumin 2.6 (L) 11/04/2019 04:11 AM  
 Globulin 3.2 11/04/2019 04:11 AM  
 
 
Records reviewed and summarized above. Radiology report(s) reviewed CAT CAP 10/25/19 IMPRESSION:  
1. Low-grade nonspecific mediastinal adenopathy. 2. Trace right pleural effusion. 3. Emphysema. 4. Left renal atrophy. 5. Colonic diverticula. 6. Moderate bladder distention with diverticula Assessment:  
1) mediastinal lymphadenopathy possible bronchogenic carcinoma. 2) anemia with iron deficiency need to be concerned about colonic and bladder cancer. 3) thrombocytopenia work up in progress to R/o HIT 
 
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   When he is well enough he will need a colonoscopy and probably a cystoscopy. We will go ahead and get him some IV iron as an outpatient if needed. · I will plan on seeing him in the office to help coordinate these issues once his cardiac status is corrected. I appreciate the opportunity to participate in Mr. Sona Kiser's care.  
 
Signed By: Monique Mohamud MD

## 2019-11-04 NOTE — DIABETES MGMT
[facility] Progress Note Recommendations/ Comments: Chart reviewed for BG control - in range; requiring very little correctional lispro Current hospital DM medications Lispro normal sensitivity correction scale Chart reviewed on Sona Kiser. Patient is 71 y.o. male s/p Impella - POD 6. No hx DM. A1c indicative of dx of diabetes. Preparing for LVAD 
 
DTC will follow as needed A1c:  
Lab Results Component Value Date/Time Hemoglobin A1c 6.6 (H) 10/25/2019 02:56 PM  
 
 
 
Recent Glucose Results:  
Lab Results Component Value Date/Time GLU 92 11/04/2019 04:11 AM  
 GLUCPOC 158 (H) 11/04/2019 12:16 PM  
 GLUCPOC 98 11/04/2019 06:57 AM  
 GLUCPOC 134 (H) 11/03/2019 10:07 PM  
  
 
Lab Results Component Value Date/Time Creatinine 1.19 11/04/2019 04:11 AM  
 
Estimated Creatinine Clearance: 68.1 mL/min (based on SCr of 1.19 mg/dL). Active Orders Diet DIET CARDIAC Regular; No Conc. Sweets PO intake:  
No data found. Will continue to follow as needed. Thank you. Desean Cedillo RN, CDE Time spent: 3 minutes

## 2019-11-04 NOTE — PROGRESS NOTES
Occupational Therapy 11/4/19 S: \"My hands are a little shaky at times. \" 
 
O: Pt received in chair. Demonstrated decreased finger to nose R> L with mild dysmetria. Pt also with slowed and uncoordinated rapid alternating movement (rapid pronation/supination). Provided pt with blue and green resistance sponges as well as yellow theraputty. Educated pt on exercises to perform and will provide handouts next session for visual reinforcement. A: Pt demonstrates decreased fine motor coordination and dexterity with mild tremors and dysmetria noted. Encouraged pt to work on exercises provided to improve coordination prior to LVAD placement for future management/ease of switchovers. P: Pt will continue to benefit from acute OT to address ADLs and fine motor skills. Total time 10 minutes

## 2019-11-04 NOTE — PROGRESS NOTES
ID Progress Note 
2019 Subjective: No specific complaints today--seems to be doing ok Objective: Antibiotics: 1. Cefepime Vitals:  
Visit Vitals /83 Pulse 91 Temp 98.8 °F (37.1 °C) Resp 16 Ht 6' 2\" (1.88 m) Wt 84.1 kg (185 lb 6.5 oz) SpO2 95% BMI 23.80 kg/m² Tmax:  Temp (24hrs), Av.2 °F (36.8 °C), Min:96.8 °F (36 °C), Max:98.8 °F (37.1 °C) Exam:  Lungs:  clear to auscultation bilaterally Heart:  regular rate and rhythm Abdomen:  soft, non-tender. Bowel sounds normal. No masses,  no organomegaly Grossly bloody urine in catheter Labs:     
Recent Labs 19 
0411 19 
0332 19 
1213 19 
0410 WBC 5.5 4.9  --  4.6 HGB 8.8* 8.4* 9.3* 9.1* PLT 86* 81*  --  91*  
BUN 25* 28*  --  32* CREA 1.19 1.16  --  1.22  
SGOT 50* 55*  --  46* * 131*  --  132* TBILI 1.9* 2.3*  --  1.9* Cultures: No results found for: [facility] Lab Results Component Value Date/Time Culture result: SERRATIA MARCESCENS (A) 10/31/2019 10:05 AM  
 Culture result: SERRATIA MARCESCENS (2ND COLONY TYPE/STRAIN) (A) 10/31/2019 10:05 AM  
 Culture result: ENTEROCOCCUS FAECALIS GROUP D (A) 10/31/2019 10:05 AM  
 
 
Radiology:  
 
Line/Insert Date:        
 
Assessment:  
 
1. UTI--Serratia (2 biotypes) from culture and small amount of Enterococcus 2. CHF/cardiomyopathy Objective: 1. Adjust antibiotic therapy Jluis Pepe MD

## 2019-11-04 NOTE — PROGRESS NOTES
[facility] Progress Note DOS:  11/4/2019 REFERRING PROVIDER:  Magen Cash MD 
PRIMARY CARE PHYSICIAN: Gita Nogueira MD 
PRIMARY CARDIOLOGIST: Magen Cash MD 
 
 
 
CC: DOUGLAS, fatigue HPI: Tammie Vidal is a 71y.o. year old pleasant white male with a history of HTN, HLD, JOSE, CAD s/p cardiac arrest VFib s/p CABG (2011) c/b sternal would infection and sternectomy, ischemic cardiomyopathy LVEF 15-20%, s/p ICD and with LBBB. He was admitted with acute on chronic systolic heart failure with massive volume overload > 20 lbs, in the setting of atrial fibrillation s/p failed DCCV and underwent DCCV and RHC on 6/18. S/p BiVICD on 6/21/19 with Dr. Amy Sanchez. He was discharged home home on IV milrinone on 6/26/19. He has been followed closely by Dr. Arnoldo Deleon and the [facility]. Mr. Leroy Herman returns to clinic for follow up with his wife. His dyspnea has progressed to the point he is sitting in a chair most of the day. He is unable to perform simple ADLs without limiting dyspnea. His appetite is down. He underwent a sleep study and is using CPAP but has difficulty due to frequent nocturia. He denies presyncope or syncope. He has had a few falls due to generalized weakness but did not hit his head nor lose consciousness. Recent labs reveal stable creatinine from 1.9 - 2.1. He is currently taking torsemide 60 mg bid. His wife has noticed an odor to his urine. He denies fever/chills but admits to feeling cold all the time. Recent Events: 
Feels well Still having epistaxis Platelets remain low PO intake improved IMPRESSION/PLAN: 
 Acute on chronic systolic heart failure 
 ICM, Stage D, NYHA Class IV, LVEF 16-20%, s/p CRT on 6/21/19 S/p Impella 5.0 implant 10/29, P8 
 CRT non-responder Intolerant to RAASi d/t renal dysfunction Off inotropes Hemodynamic parameters CI > 2, CVP < 14, MAP > 65 PRN bumex for CVP > 10 Hold Spironolactone due to IVVD Trend PBNP, CMP  Strict I/O 
 Daily Weights Follow renal function and electrolytes closely Eval in process: Needs Chest CT- ordered Timing of colonoscopy, EGD early next week LHC - TBD No dental needed- has dentures Continue pre- op education PFTs today Shock Liver Improving after Impella placement LFTs trending down TBili trending down No Tolvaptan due to liver dysfunction History of VF arrest - s/p AICD No recent shocks Thrombocytopenia Platelets 86 today Monitor for now Likely multifactorial  
 
 CAD s/p CABG in 2010 Needs [facility]- timing TBD, hopefully early next week with Dr. Priya Nava PAF s/p DCCV 6/18/19 Hold eliquis AC on hold Continue amiodarone Chronic Kidney Disease 3 - single kidney Nephrology following Renal US- no obstructive disease History of Urinary Retention UA - positive culture for GNR (preliminary) Lizama cath placed JOSE on CPAP Cont use home CPAP machine History of Sternal wound infection requiring sternectomy Stable CV surgery aware - not a contraindication for LVAD Tagged WBC negative ESR 30 Cortisol 22.4 Acute hematuria d/t recurrent UTI 
 improving Cont Cefepime Hold ASA for now 
 urology following- cont irrigation Transfuse to keep hgb > 8 Anemia, iron deficiency Iron sat 9% S/p Venofer Repeat iron profile Sunday Hematology recommendations appreciated Unable to use octreotide/doxy due to prolonged QTc Fecal occult today H/o DVT 
 AC on hold for hematuria Migraines 
 stable H/o tobacco abuse Counseling - continued abstinence Depression On lexapro Anorexia - likely multifactorial (depression, congestive hepatopathy) Prealbumin 16.2 Prealbumin weekly Nutritional supplements Mediastinal Lymphadenopathy D.w with Dr. Claire Capone- does not think this is lung CA, patient is too high risk for lung biopsy Repeat chest CT w/o contrast today Patient cannot have PET scan due to impella requiring D5 in purge fluid Vocal Cord Paralysis Speech therapy recs appreciated- will postpone MBS for now Aspiration Precautions Thickened liquids when able to take PO 
 
PPX Bowel regimen Impella abx prophylaxis Advance diet as tolerated D5 via impella CARDIAC EVALUATION 
ECHO (5/31/19) LVEF 21-25%, severely dilated LA, moderately dilated RA 
ECHO (6/24/19) LVEF 16-20%, Severely dilated LV, trace MR, trace TR 
 
EKG (6/12/19) atrial flutter with occasional PVCs, LBBB QRS 158ms EKG (6/26/19) Atrial fibrillation with premature ventricular or aberrantly conducted complexes, Left bundle branch block, rate 86, , QTc 564 [facility] not in epic Review of Systems:    
Review of Systems Constitutional: Negative for chills, fever and malaise/fatigue. Feels thirsty HENT: Positive for nosebleeds. Respiratory: Negative. Negative for shortness of breath. Cardiovascular: Negative for chest pain and leg swelling. Gastrointestinal: Negative for blood in stool, heartburn, nausea and vomiting. Genitourinary: Positive for hematuria. Neurological: Negative for dizziness, weakness and headaches. Endo/Heme/Allergies: Does not bruise/bleed easily. Impella Flowsheets P8 Flow 4.1 Purge Pressure 359 Purge Flow 17.9 Physical Exam:  
 
Visit Vitals /83 Pulse 78 Temp 96.8 °F (36 °C) Resp 21 Ht 6' 2\" (1.88 m) Wt 185 lb 6.5 oz (84.1 kg) SpO2 90% BMI 23.80 kg/m² Hemodynamics: 
 CO: CO (l/min): 8.3 l/min CI: CI (l/min/m2): 4 l/min/m2 CVP: CVP (mmHg): 6 mmHg (11/04/19 1000) SVR: SVR (dyne*sec)/cm5: 781 (dyne*sec)/cm5 (11/03/19 1500) PAP Systolic: PAP Systolic: 45 (32/48/39 6774) PAP Diastolic: PAP Diastolic: 19 (51/73/50 8314) PVR:   
 SV02: SVO2 (%): 51 % (11/03/19 1500) SCV02: SCVO2 (%): 75 % (10/29/19 1900) Physical Exam  
 Constitutional: He is oriented to person, place, and time and well-developed, well-nourished, and in no distress. He appears unhealthy. He appears cachectic. No distress. HENT:  
Head: Normocephalic. Eyes: Pupils are equal, round, and reactive to light. Neck: Normal range of motion. Neck supple. JVD present. Cardiovascular: Normal rate, regular rhythm, normal heart sounds and intact distal pulses. No murmur heard. Pulmonary/Chest: Effort normal and breath sounds normal. No respiratory distress. Abdominal: Soft. Bowel sounds are normal. He exhibits no distension. Musculoskeletal: Normal range of motion. He exhibits no edema. Neurological: He is alert and oriented to person, place, and time. Skin: Skin is warm and dry. Nursing note and vitals reviewed. History: 
Past Medical History:  
Diagnosis Date  Degenerative disc disease, lumbar  Heart failure ([facility] [address]  High cholesterol  Hypertension  Paroxysmal atrial fibrillation ([address] 4/2/2019  Spinal stenosis Past Surgical History:  
Procedure Laterality Date  HX APPENDECTOMY  HX CORONARY ARTERY BYPASS GRAFT    
 triple  HX HERNIA REPAIR    
 HX IMPLANTABLE CARDIOVERTER DEFIBRILLATOR  WA CARDIOVERSION ELECTIVE ARRHYTHMIA EXTERNAL N/A 6/10/2019 EP CARDIOVERSION performed by Kendrick Quintero MD at [place] [address], [facility] [address] CATH LAB  WA CARDIOVERSION ELECTIVE ARRHYTHMIA EXTERNAL N/A 6/18/2019 EP CARDIOVERSION performed by Bishop Nadya MD at [place] [address], [facility] [address] CATH LAB  WA INSJ/RPLCMT PERM DFB W/TRNSVNS LDS 1/DUAL CHMBR N/A 6/21/2019 INSERT ICD BIV MULTI performed [facility] Liz[facility] Zaragoza [facility] [place] [facility] [place] [address], [facility] [address] CATH LAB  WA TCAT IMPL WRLS P-ART PRS SNR L-T HEMODYN MNTR N/A 9/18/2019 IMPLANT HEART FAILURE MONITORING DEVICE performed by Rocio Urban MD [place] [facility] [place] [address], [facility]  CATH LAB Social History Socioeconomic History  Marital status:  Spouse name: Not on file  Number of children: Not on file  Years of education: Not on file  Highest education level: Not on file Occupational History  Not on file Social Needs  Financial resource strain: Not on file  Food insecurity:  
  Worry: Not on file Inability: Not on file  Transportation needs:  
  Medical: Not on file Non-medical: Not on file Tobacco Use  Smoking status: Former Smoker Last attempt to quit: 2010 Years since quittin.9  Smokeless tobacco: Never Used Substance and Sexual Activity  Alcohol use: Not Currently Comment: rarely  Drug use: Never  Sexual activity: Not on file Lifestyle  Physical activity:  
  Days per week: Not on file Minutes per session: Not on file  Stress: Not on file Relationships  Social connections:  
  Talks on phone: Not on file Gets together: Not on file Attends Mandaen service: Not on file Active member of club or organization: Not on file Attends meetings of clubs or organizations: Not on file Relationship status: Not on file  Intimate partner violence:  
  Fear of current or ex partner: Not on file Emotionally abused: Not on file Physically abused: Not on file Forced sexual activity: Not on file Other Topics Concern [address] Service Not Asked  Blood Transfusions Not Asked  Caffeine Concern Not Asked  Occupational Exposure Not Asked Kye Peeling Hazards Not Asked  Sleep Concern Not Asked  Stress Concern Not Asked  Weight Concern Not Asked  Special Diet Not Asked  Back Care Not Asked  Exercise Not Asked  Bike Helmet Not Asked  Seat Belt Not Asked  Self-Exams Not Asked Social History Narrative  Not on file Family History Problem Relation Age of Onset  Lung Disease Mother  Hypertension Mother Aundria Dadds Arthritis-osteo Mother  Heart Disease Mother  Heart Disease Father  Diabetes Father Current Medications: Current Facility-Administered Medications Medication Dose Route Frequency Provider Last Rate Last Dose  insulin lispro (HUMALOG) injection   SubCUTAneous AC&HS Mitch Ham MD   Stopped at 11/03/19 0730  bumetanide (BUMEX) injection 1 mg  1 mg IntraVENous Q6H PRN Gregg Pina MD   1 mg at 11/04/19 4462  cefepime (MAXIPIME) 2 g in 0.9% sodium chloride (MBP/ADV) 100 mL  2 g IntraVENous Q12H Catrina Avendano  mL/hr at 11/04/19 0931 2 g at 11/04/19 5096  fluticasone propionate (FLONASE) 50 mcg/actuation nasal spray 2 Spray  2 Spray Both Nostrils DAILY Gregg Pina MD   [address] [place] at 11/04/19 0849  
 oxymetazoline (AFRIN) 0.05 % nasal spray 2 Spray  2 Spray Both Nostrils BID PRN Gregg Pina MD      
 sodium chloride (OCEAN) 0.65 % nasal squeeze bottle 2 Spray  2 Spray Both Nostrils QID Gregg Pina MD   [address] [place] at 11/04/19 0849  
 alteplase (CATHFLO) 1 mg in dextrose 5% 50 mL impella purge solution  1 mg Other TITRATE Rachel Bird MD 0 mL/hr at 11/03/19 0937 1 mg at 11/03/19 2043  epoetin yvonne-epbx (RETACRIT) injection 10,000 Units  10,000 Units SubCUTAneous Q TUE, THU & SAT Delio Shields MD   10,000 Units at 11/02/19 2320  pantoprazole (PROTONIX) tablet 40 mg  40 mg Oral ACB Nadeem DIOR, NP   40 mg at 11/04/19 9686  dextrose 5% infusion  4-20 mL/hr IntraVENous CONTINUOUS Nadeem DIOR NP 17.1 mL/hr at 11/04/19 0421 17.1 mL/hr at 11/04/19 0421  bivalirudin (ANGIOMAX) 250 mg in dextrose 5% 250 mL infusion  4-20 mL/hr Other TITRATE Yojana Lan NP   Stopped at 11/01/19 2210  
 alteplase (CATHFLO) 1 mg in sterile water (preservative free) 1 mL injection  1 mg InterCATHeter PRN Yojana Gamma, NP   1 mg at 11/03/19 1344  bacitracin 500 unit/gram packet 1 Packet  1 Packet Topical PRN Annamary Gamma, NP      
 sodium chloride (NS) flush 5-40 mL  5-40 mL IntraVENous Q8H Nadeem Marks D, NP   10 mL at 11/04/19 8969  sodium chloride (NS) flush 5-40 mL  5-40 mL IntraVENous PRN Monica Michael NP      
 0.45% sodium chloride infusion  10 mL/hr IntraVENous CONTINUOUS Jose DIOR NP 10 mL/hr at 10/29/19 1321 10 mL/hr at 10/29/19 1321  
 0.9% sodium chloride infusion  10 mL/hr IntraVENous CONTINUOUS Shana Sims, NP 10 mL/hr at 10/31/19 1300 10 mL/hr at 10/31/19 1300  
 oxyCODONE IR (ROXICODONE) tablet 5 mg  5 mg Oral Q4H PRN Jose DIOR NP   5 mg at 11/04/19 1132  
 oxyCODONE IR (ROXICODONE) tablet 10 mg  10 mg Oral Q4H PRN Monica Mcihael NP   10 mg at 11/03/19 2159  morphine injection 4 mg  4 mg IntraVENous Q2H PRN Monica Michael NP      
 naloxone [facility]) injection 0.4 mg  0.4 mg IntraVENous PRN Monica Michael NP      
 ondansetron [facility]) injection 4 mg  4 mg IntraVENous Q4H PRN Jose DIOR NP   4 mg at 10/31/19 7147  
 albuterol (PROVENTIL VENTOLIN) nebulizer solution 2.5 mg  2.5 mg Nebulization Q4H PRN Monica Michael NP      
 chlorhexidine (PERIDEX) 0.12 % mouthwash 10 mL  10 mL Oral Q12H Jose DIOR NP   10 mL at 11/04/19 0849  
 magnesium oxide (MAG-OX) tablet 400 mg  400 mg Oral BID Jose DIOR NP   400 mg at 11/04/19 8737  calcium chloride 1 g in 0.9% sodium chloride 250 mL IVPB  1 g IntraVENous PRN Monica Michael NP      
 bisacodyl (DULCOLAX) suppository 10 mg  10 mg Rectal DAILY PRN Monica Michael NP      
 senna-docusate (PERICOLACE) 8.6-50 mg per tablet 1 Tab  1 Tab Oral BID Monica Michael NP   1 Tab at 11/04/19 4337  polyethylene glycol (MIRALAX) packet 17 g  17 g Oral DAILY Jose DIOR NP   17 g at 11/04/19 3226  ELECTROLYTE REPLACEMENT NOTE: Nurse to review Serum Potassium and Magnesuim levels and Initiate Electrolyte Replacement Protocol as needed  1 Each Other PRN Monica Michael NP      
 magnesium sulfate 1 g/100 ml IVPB (premix or compounded)  1 g IntraVENous PRN Bob Cornell NP      
 glucose chewable tablet 16 g  4 Tab Oral PRN Bob Cornell NP      
 glucagon [facility]) injection 1 mg  1 mg IntraMUSCular PRN Bob Cornell NP      
 dextrose 10% infusion 0-250 mL  0-250 mL IntraVENous PRN Bob Cornell NP      
 morphine injection 2 mg  2 mg IntraVENous Q2H PRN Bob Cornell NP      
 melatonin tablet 3 mg  3 mg Oral QHS PRN Bob Cornell NP   3 mg at 10/30/19 2056  albumin human 5% (BUMINATE) solution 25 g  25 g IntraVENous Q2H PRN Colby Lozano MD   25 g at 10/30/19 7682  amiodarone (CORDARONE) tablet 100 mg  100 mg Oral BID Orpha Bakari D, NP   100 mg at 11/04/19 0847  
 influenza vaccine 2019-20 (6 mos+)(PF) (FLUARIX/FLULAVAL/FLUZONE QUAD) injection 0.5 mL  0.5 mL IntraMUSCular PRIOR TO DISCHARGE Bob Cornell NP      
 [Held by provider] aspirin delayed-release tablet 81 mg  81 mg Oral DAILY Orpha Bakari D, NP   81 mg at 10/31/19 2207  tamsulosin (FLOMAX) capsule 0.8 mg  0.8 mg Oral DAILY Orpha Bakari D, NP   0.8 mg at 11/04/19 0847  
 allopurinol (ZYLOPRIM) tablet 100 mg  100 mg Oral DAILY Orpha Bakari D, NP   100 mg at 11/04/19 9891 Allergies: No Known Allergies Recent Labs:  
Labs Latest Ref Rng & Units 11/4/2019 11/3/2019 11/2/2019 11/2/2019 11/1/2019 10/31/2019 10/31/2019 WBC 4.1 - 11.1 K/uL 5.5 4.9 - 4.6 4.3 - 4.1  
RBC 4.10 - 5.70 M/uL 3.03(L) 2.92(L) - 3.16(L) 3.12(L) - 3.11(L) Hemoglobin 12.1 - 17.0 g/dL 8.8(L) 8.4(L) 9.3(L) 9.1(L) 9.0(L) 9.4(L) 8. 9(L) Hematocrit 36.6 - 50.3 % 28. 1(L) 27. 1(L) 29. 7(L) 28. 7(L) 28. 8(L) 29. 5(L) 28. 8(L) MCV 80.0 - 99.0 FL 92.7 92.8 - 90.8 92.3 - 92.6 Platelets 445 - 139 K/uL 86(L) 81(L) - 91(L) 90(L) - 91(L) Lymphocytes 12 - 49 % - - - - - - - Monocytes 5 - 13 % - - - - - - - Eosinophils 0 - 7 % - - - - - - - Basophils 0 - 1 % - - - - - - -  
 Albumin 3.5 - 5.0 g/dL 2. 6(L) 2. 7(L) - 2. 6(L) 2. 7(L) - 2. 9(L) Calcium 8.5 - 10.1 MG/DL 8.7 8.4(L) - 8. 4(L) 8.6 - 8.4(L) SGOT 15 - 37 U/L 50(H) 55(H) - 46(H) 30 - 34 Glucose 65 - 100 mg/dL 92 87 - 96 110(H) - 97 BUN 6 - 20 MG/DL 25(H) 28(H) - 32(H) 26(H) - 27(H) Creatinine 0.70 - 1.30 MG/DL 1.19 1.16 - 1.22 1.35(H) - 1.57(H) Sodium 136 - 145 mmol/L 130(L) 132(L) - 131(L) 130(L) - 130(L) Potassium 3.5 - 5.1 mmol/L 4.2 3.9 - 3.9 4.0 - 3.8 TSH 0.36 - 3.74 uIU/mL - - - - - - -  
PSA 0.01 - 4.0 ng/mL - - - - - - -  
LDH 85 - 241 U/L 458(H) 435(H) - 446(H) 350(H) - 312(H) Some recent data might be hidden Lab Results Component Value Date/Time WBC 5.5 11/04/2019 04:11 AM  
 HGB 8.8 (L) 11/04/2019 04:11 AM  
 HCT 28.1 (L) 11/04/2019 04:11 AM  
 PLATELET 86 (L) 99/49/0990 04:11 AM  
 MCV 92.7 11/04/2019 04:11 AM  
 
Lab Results Component Value Date/Time GFR est non-AA >60 11/04/2019 04:11 AM  
 GFR est AA >60 11/04/2019 04:11 AM  
 Creatinine 1.19 11/04/2019 04:11 AM  
 BUN 25 (H) 11/04/2019 04:11 AM  
 Sodium 130 (L) 11/04/2019 04:11 AM  
 Potassium 4.2 11/04/2019 04:11 AM  
 Chloride 95 (L) 11/04/2019 04:11 AM  
 CO2 34 (H) 11/04/2019 04:11 AM  
 Magnesium 2.4 11/04/2019 04:11 AM  
 Phosphorus 2.4 (L) 06/04/2019 04:09 AM  
 
Lab Results Component Value Date/Time TSH 2.40 10/25/2019 07:39 PM  
 T4, Free 1.7 (H) 10/25/2019 07:39 PM  
  
Lab Results Component Value Date/Time  Sodium 130 (L) 11/04/2019 04:11 AM  
 Potassium 4.2 11/04/2019 04:11 AM  
 Chloride 95 (L) 11/04/2019 04:11 AM  
 CO2 34 (H) 11/04/2019 04:11 AM  
 Anion gap 1 (L) 11/04/2019 04:11 AM  
 Glucose 92 11/04/2019 04:11 AM  
 BUN 25 (H) 11/04/2019 04:11 AM  
 Creatinine 1.19 11/04/2019 04:11 AM  
 BUN/Creatinine ratio 21 (H) 11/04/2019 04:11 AM  
 GFR est AA >60 11/04/2019 04:11 AM  
 GFR est non-AA >60 11/04/2019 04:11 AM  
 Calcium 8.7 11/04/2019 04:11 AM  
 Bilirubin, total 1.9 (H) 11/04/2019 04:11 AM  
 ALT (SGPT) 23 11/04/2019 04:11 AM  
 AST (SGOT) 50 (H) 11/04/2019 04:11 AM  
 Alk. phosphatase 146 (H) 11/04/2019 04:11 AM  
 Protein, total 5.8 (L) 11/04/2019 04:11 AM  
 Albumin 2.6 (L) 11/04/2019 04:11 AM  
 Globulin 3.2 11/04/2019 04:11 AM  
 A-G Ratio 0.8 (L) 11/04/2019 04:11 AM  
  
Lab Results Component Value Date/Time Hemoglobin A1c 6.6 (H) 10/25/2019 02:56 PM  
  
 
 
Thank you for letting us see him with you, TIERRA Cisneros Rueda [address] [facility], [address], [address] Office [phone] Fax [phone] ATTENDING ADDENDUM: 
 
Continue impella speed at P8 as bridge to decision; cannot tolerate lower speeds Concerns regarding LVAD candidacy are as follows: · Needs to recuperate from [place] UTI and hematuria to resolve · Needs to be able to tolerate aspirin and full dose anticoagulation · Needs aggressive rehabilitation and improved nutritional state · Concern about requirement of oxygen and degree of emphysema; PFT and ABG today on room air (cannot do DLCO); will not qualify for LVAD if oxygen dependent COPD · No evidence of lung cancer on chest CT, mild lymphadenopathy, will not be able to do PET scan due to impella purge and angiomax requiring D5 · Awaiting GI comment on timing of EGD/C-scope given active infection and acceptable PLT count · Defer LHC until treatment of UTI complete Nano Holly MD 
 
Critically ill Critical care 35 min

## 2019-11-04 NOTE — PROGRESS NOTES
Problem: Mobility Impaired (Adult and Pediatric) Goal: *Acute Goals and Plan of Care (Insert Text) Description FUNCTIONAL STATUS PRIOR TO ADMISSION: Patient was modified independent using a single point cane for functional mobility. Patient reports an increasingly sedentary lifestyle 2* fatigue and SOB/dyspnea. Retired (so is his wife). Patient reports x 3 falls within the last couple of weeks. Patient is wearing either nasal cannula or CPAP at night. LVAD work-up has been initiated. HOME SUPPORT PRIOR TO ADMISSION: The patient lived with his wife, but did not require physical assistance. Physical Therapy Goals Initiated 10/27/2019 1. Patient will move from supine to sit and sit to supine, scoot up and down and roll side to side in bed with independence within 7 days. 2.  Patient will perform sit to/from stand with supervision/set-up within 7 days. 3.  Patient will ambulate 150 feet with least restrictive assistive device and supervision/set-up within 7 days. 4.  Patient will ascend/descend 4 stairs with  handrail(s) with supervision/set-up within 7 days for functional strengthening and community reintegration. Mihaela Riser 5.  Patient will verbally and functionally recall 3/3 sternal precautions within 7 days in preparation for LVAD implantation. 6.  Patient will perform a mock power exchange for power module to/from battery with supervision/set-up within 7 days in preparation for LVAD implantation. Outcome: Progressing Towards Goal 
 PHYSICAL THERAPY TREATMENT Patient: Matt Bailey (75 y.o. male) Date: 11/4/2019 Diagnosis: Acute decompensated heart failure ([facility] [address] [I50.9] <principal problem not specified> Procedure(s) (LRB): 
RIGHT AXILLARY IMPELLA INSERTION (Right) 6 Days Post-Op Precautions: Fall Chart, physical therapy assessment, plan of care and goals were reviewed. ASSESSMENT Patient continues with skilled PT services and is progressing towards goals. Patient received sitting in chair, eager to ambulate. After ambulating with mod Ax1 without RW and noting static, soft knee buckling, patient utilized RW for remainder of gait. Required 3 standing rest breaks due to fatigue (more muscular fatigue in LEs than SOB). Gait generally quite unsteady but able to utilize RW to balance self with only outside min A from PT. Education provided on sternal precautions following LVAD surgery and completed 2 transfers while observing precautions. Patient with great insight and intuition with applying precautions. Current Level of Function Impacting Discharge (mobility/balance): min-mod A for gait, min A sit<>stand Other factors to consider for discharge: LVAD work up, supportive wife and family PLAN : 
Patient continues to benefit from skilled intervention to address the above impairments. Continue treatment per established plan of care. to address goals. Recommendation for discharge: (in order for the patient to meet his/her long term goals) To be determined: based on medical course, LVAD workup This discharge recommendation: 
Has not yet been discussed the attending provider and/or case management IF patient discharges home will need the following DME: to be determined (TBD) SUBJECTIVE:  
Patient stated I was getting a little bit antsy sitting here. Thank y'all for coming.  OBJECTIVE DATA SUMMARY:  
Critical Behavior: 
Neurologic State: Alert Orientation Level: Oriented X4 Cognition: Appropriate decision making, Appropriate for age attention/concentration, Appropriate safety awareness, Follows commands Safety/Judgement: Awareness of environment, Insight into deficits Functional Mobility Training: 
Transfers: 
Sit to Stand: Contact guard assistance;Minimum assistance Stand to Sit: Contact guard assistance Balance: 
Sitting: Intact Standing: Impaired; With support Standing - Static: Fair Standing - Dynamic : Fair Ambulation/Gait Training: 
Distance (ft): 100 Feet (ft) Assistive Device: Gait belt;Walker, rolling Ambulation - Level of Assistance: Minimal assistance; Moderate assistance;Assist x1(+ second person for impella management) Gait Abnormalities: Decreased step clearance;Shuffling gait(intermittent knee buckling) Therapeutic Exercises:  
Sit<>stand x2 with sternal precautions (utmost min A) Pain Rating: 
Denied pain Activity Tolerance:  
Good, requires rest breaks and observed SOB with activity Please refer to the flowsheet for vital signs taken during this treatment. After treatment patient left in no apparent distress:  
Sitting in chair and Call bell within reach COMMUNICATION/COLLABORATION:  
The patients plan of care was discussed with: Occupational Therapist and Registered Nurse Claire Boone, PT, DPT Time Calculation: 25 mins

## 2019-11-04 NOTE — PROGRESS NOTES
Urology Progress Note Patient: Matt Bailey MRN: [id number]  SSN: [id number] YOB: 1950  Age: 71 y.o. Sex: male ADMITTED: 10/25/2019 to Lamin Del Valle MD for [facility] [I50.9] POD# 6 Days Post-Op Procedure(s): RIGHT AXILLARY IMPELLA INSERTION Comfortable. 22 3 way in with CBI port plugged. Urine light cherry with some small clots. Abd benign. Vitals: Temp (24hrs), Av.6 °F (37 °C), Min:98.2 °F (36.8 °C), Max:99.1 °F (37.3 °C) Blood pressure 91/78, pulse 76, temperature 98.6 °F (37 °C), resp. rate 18, height 6' 2\" (1.88 m), weight 84.1 kg (185 lb 6.5 oz), SpO2 91 %. Intake and Output: 
 1901 -  0700 In: 1807.2 [P.O.:240; I.V.:1157.2] Out: Enrigue Rump [[id number]] Labs: 
Labs:  
Lab Results Component Value Date/Time WBC 5.5 2019 04:11 AM  
 HGB 8.8 (L) 2019 04:11 AM  
 Creatinine 1.19 2019 04:11 AM  
 
 
 
Assessment/Plan: 
 Gross hematuria/UTI. Continue conservative management - hand irrigate PRN. Signed By: Iris Moe MD - 2019

## 2019-11-04 NOTE — PROGRESS NOTES
11/03/19: Bedside shift change report given to Bakari Garcia RN (oncoming nurse) by Bakari Garcia RN (offgoing nurse). Report included the following information SBAR, Kardex, Procedure Summary, Intake/Output, MAR, Accordion, Recent Results, Med Rec Status, Cardiac Rhythm Paced and Alarm Parameters . SHIFT SUMMARY: Bumex 1mg given x2 for CVP over 10, cathflow x1 for impella flow under 3. Lizama irrigated once d/t clots. VSS

## 2019-11-04 NOTE — PROGRESS NOTES
0730 Bedside and Verbal shift change report given to Leydi RN (oncoming nurse) by Kesha Darby RN (offgoing nurse). Report included the following information SBAR, Kardex, Intake/Output, MAR, Recent Results and Cardiac Rhythm paced. Nose bleed started this AM  
1115 MAC removed 1145 Manual bladder irrigation 1500 Pt walking with PT  
2731 Pts urine output significantly decreased - pt bladder scanned - 801mL of urine 1845 Manual bladder irrigation - pt in extreme pain, unable to get neal to drain after manual bladder irrigation Urology paged 0 Spoke w/ Urology - orders received to replace neal & if still unable to drain the 800cc of urine to start CBI 
1930 Bedside and Verbal shift change report given to Leland Castano RN (oncoming nurse) by Hailee Macdonald RN (offgoing nurse). Report included the following information SBAR, Kardex, Intake/Output, MAR, Recent Results and Cardiac Rhythm paced.

## 2019-11-04 NOTE — INTERDISCIPLINARY ROUNDS
IDR/SLIDR Summary Patient: Milagro Holley MRN: [id number]    Age: 71 y.o. YOB: 1950 Room/Bed: [address] [place] [address] Admit Diagnosis: Acute decompensated heart failure (HCC) [I50.9]  Principal Diagnosis: <principal problem not specified>  
Goals: Decreased dyspnea with activity ; impella placement today Readmission: YES  Quality Measure: CHF VTE Prophylaxis: Chemical 
Influenza Vaccine screening completed? YES Pneumococcal Vaccine screening completed? YES Mobility needs: No   Nutrition plan:No 
Consults:Case Management Financial concerns:No  Escalated to CM? NO 
RRAT Score: 17   Interventions:H2H Testing due for pt today? NO 
LOS: 10 days Expected length of stay TBD days Discharge plan: TBD   PCP: Christian Calixto MD 
Transportation needs: No   
Days before discharge:two or more days before discharge Discharge disposition: Home Signed:  
 
Debbie Power RN 
11/4/2019 
1:37 AM

## 2019-11-04 NOTE — PROGRESS NOTES
[facility] ICU Progress Note Admit Date: 10/25/2019 POD:  4 Day Post-Op Procedure:  Procedure(s): RIGHT AXILLARY IMPELLA INSERTION Subjective:  
Pt seen with Dr. Wilder Laguerre. On NC 4L, resting in bed. Afebrile. Impella P8, D5 purge. Lizama in place-PRN bladder irrigation. Mild nose bleed this morning. Objective:  
Vitals: 
Blood pressure 92/74, pulse 70, temperature 96.8 °F (36 °C), resp. rate 16, height 6' 2\" (1.88 m), weight 185 lb 6.5 oz (84.1 kg), SpO2 90 %. Temp (24hrs), Av.3 °F (36.8 °C), Min:96.8 °F (36 °C), Max:99.1 °F (37.3 °C) Hemodynamics: 
 CO: CO (l/min): 8.3 l/min CI: CI (l/min/m2): 4 l/min/m2 CVP: CVP (mmHg): 7 mmHg (19) SVR: SVR (dyne*sec)/cm5: 781 (dyne*sec)/cm5 (19 1500) PAP Systolic: PAP Systolic: 45 (89/93/75 2260) PAP Diastolic: PAP Diastolic: 19 (53/47/80 1919) PVR:   
 SV02: SVO2 (%): 51 % (19 1500) SCV02: SCVO2 (%): 75 % (10/29/19 1900) EKG/Rhythm:   
NSR Oxygen Therapy: 
Oxygen Therapy O2 Sat (%): 90 % (19) Pulse via Oximetry: 83 beats per minute (19) O2 Device: Nasal cannula (19) O2 Flow Rate (L/min): 4 l/min (19) FIO2 (%): 40 % (10/30/19 08) CXR:  
CXR Results  (Last 48 hours) 19 05  XR CHEST PORT Final result Impression:  IMPRESSION:  
1. No change mild pulmonary edema Narrative:  EXAM: XR CHEST PORT INDICATION: postop heart surgery, cardiac assist device COMPARISON: 11/3/2019 FINDINGS: A portable AP radiograph of the chest was obtained at 0445 hours. The  
patient is on a cardiac monitor. The [place]-Russ catheter has been removed. Right IJ cordis catheter, PICC line and cardiac assist device are in satisfactory  
position. ICD remains in place. There is no change in mild cardiomegaly and mild  
pulmonary edema. 19 0426  XR CHEST PORT Final result Impression:  Impression: No significant interval change. Narrative:  Chest portable AP History: Postop heart Comparison: 2019 Findings: Cardiac monitoring leads overlie the chest. A left subclavian  
pacemaker is in place. A right IJ and patella device is unchanged. Right PICC  
line and right subclavian lines unchanged. The patient is status post median  
sternotomy. The heart is mildly enlarged, but unchanged. There is unchanged mild  
edema. No focal consolidation, pleural effusion, or pneumothorax. Admission Weight: Last Weight Weight: 192 lb 10.9 oz (87.4 kg) Weight: 185 lb 6.5 oz (84.1 kg) Intake / Output / Drain: 
Current Shift: No intake/output data recorded. Last 24 hrs.:  
 
Intake/Output Summary (Last 24 hours) at 2019 0901 Last data filed at 2019 0700 Gross per 24 hour Intake 1166.88 ml Output 4110 ml Net -2943.12 ml EXAM: 
General:   NAD Lungs:   Diminished. Incision:  Impella dressing C/D/I. Heart:  Regular rate and rhythm, S1, S2 normal, no murmur, click, rub or gallop. Abdomen:   Soft, non-tender. Bowel sounds hypoactive. No masses,  No organomegaly. Extremities: Mod edema. PPP. Neurologic:  Gross motor and sensory apparatus intact. Labs:  
Recent Labs 19 
4414 19 
0411 WBC  --  5.5 HGB  --  8.8* HCT  --  28.1*  
PLT  --  86* NA  --  130* K  --  4.2 BUN  --  25* CREA  --  1.19 GLU  --  92  
GLUCPOC 98  --   
INR  --  1.1 Assessment:  
 
Active Problems: 
  Acute decompensated heart failure ([facility] (10/25/2019) Plan/Recommendations/Medical Decision Makin. Acute on chronic systolic (CHF Class IV) S/P Impella: Has AICD. LV EF 16-20%. No BB/ACE/ARB/AA until appropriate. Bumex 1 mg IV QID. Trend proBNP, lactate, LDH. Strict I/Os. Daily weights. LVAD w/u in process.  On cefepime for prophylaxis. Hold bival purge due to hematuria/epistaxis 2. Thrombocytopenia: Platelets 14K. No asa. HIT negative, LOAN pending. On protonix. Heme following. 3. CAD S/p CABG 2010: Needs [facility], plans for next week w/ Dr. Maureen Werner. Stop asa d/t hematuria/thrombocytopenia, not on statin historically. No BB D/t pressors/intropes. 4. PAF s/p DCCV 6/2019: Holding eliquis due to hematuria/epistaxis. On PO amio. 5. JUAN J on CKD stage IV: Monitor. Renal following. Diuretics scheduled. Keep neal. 6. Hx of urinary retention/BPH, hematuria:  On flomax. Neal for bladder irrigation PRN, hold off on CBI per urology for now. Urology following. 7. JOSE: qhs CPAP. 8. Hx of sternal wound infection requring sternectomy: Not a contraindication for future LVAD. Tagged WBC -- showed no abnormal tracer uptake. 9. Iron def anemia: s/p venofer. On Epogen. Cannot use doxy/octreotide d/t prolonged QTC. 10. Vocal cord paralysis: Voice improving. Speech eval PRN. Advance diet as tolerated. 11. Constipation: last BM 10/31. On pericolace, miralax(pt keeps refusing). KUB ok. Prn suppository 12. Serratia UTI/hematuria: Neal irrigation, urology following. On cefepime. 13. Mediastinal lymphadenopathy:  Per AHF, low threshold for Carcinoma per CT scans. Eventually needs PET scan. 14. Hyponatremia: monitor 15. Acute Moderate protein-calorie malnutrition:  Pre-albumin 10/25 was 13.9. Recheck today. Pt eating cardiac diet well. Dispo: Care and orders per AHFS. Remain in CCU.  
 
Signed By: Eloisa Rodrigues NP

## 2019-11-04 NOTE — CONSULTS
[facility] 
[address] [facility], [address] GI PROGRESS NOTE Will Heather Jiménez, [address] [facility] office [phone] NP/PA in-hospital cell phone M-F until 4:30PM 
After 5PM or on weekends, please call  for physician on call NAME: Corby David :  1950 MRN:  [id number] Subjective:  
Patient was seen earlier during this admission for LVAD evaluation/colonoscopy. He was intubated in the ICU at that time. We were re-consulted for timing of colonoscopy, EGD. Patient denies nausea, vomiting, or abdominal pain. No change in bowel habits, melena, or hematochezia. Patient reports that he usually has a bowel movement every 4 days. Objective: VITALS:  
Last 24hrs VS reviewed since prior progress note. Most recent are: 
Visit Vitals BP 91/68 Pulse 91 Temp 96.8 °F (36 °C) Resp 19 Ht 6' 2\" (1.88 m) Wt 84.1 kg (185 lb 6.5 oz) SpO2 90% BMI 23.80 kg/m² PHYSICAL EXAM: 
General: Cooperative, no acute distress   
Neurologic:  Alert and oriented HEENT: EOMI, no scleral icterus Lungs:  Diminished bilaterally anteriorly Heart:  S1 S2 Abdomen: Soft, non-distended, no tenderness, no guarding, no rebound. +Bowel sounds. Extremities: Warm Psych:   Not anxious or agitated Lab Data Reviewed:  
 
Recent Results (from the past 24 hour(s)) GLUCOSE, POC Collection Time: 19 12:09 PM  
Result Value Ref Range Glucose (POC) 136 (H) 65 - 100 mg/dL Performed by Terrence Euceda, POC Collection Time: 19  5:13 PM  
Result Value Ref Range Glucose (POC) 127 (H) 65 - 100 mg/dL Performed by Terrence Euceda, POC Collection Time: 19 10:07 PM  
Result Value Ref Range Glucose (POC) 134 (H) 65 - 100 mg/dL Performed by Tara BRAVO Collection Time: 19  4:11 AM  
Result Value Ref Range  (H) 85 - 241 U/L  
NT-PRO BNP  Collection Time: 19  4:11 AM  
 Result Value Ref Range NT pro-BNP 9,961 (H) <125 PG/ML  
LACTIC ACID Collection Time: 11/04/19  4:11 AM  
Result Value Ref Range Lactic acid 0.9 0.4 - 2.0 MMOL/L  
PROCALCITONIN Collection Time: 11/04/19  4:11 AM  
Result Value Ref Range Procalcitonin 0.2 ng/mL CBC W/O DIFF Collection Time: 11/04/19  4:11 AM  
Result Value Ref Range WBC 5.5 4.1 - 11.1 K/uL  
 RBC 3.03 (L) 4.10 - 5.70 M/uL HGB 8.8 (L) 12.1 - 17.0 g/dL HCT 28.1 (L) 36.6 - 50.3 % MCV 92.7 80.0 - 99.0 FL  
 MCH 29.0 26.0 - 34.0 PG  
 MCHC 31.3 30.0 - 36.5 g/dL  
 RDW 18.6 (H) 11.5 - 14.5 % PLATELET 86 (L) 619 - 400 K/uL MPV 10.4 8.9 - 12.9 FL  
 NRBC 0.0 0  WBC ABSOLUTE NRBC 0.00 0.00 - 0.01 K/uL METABOLIC PANEL, COMPREHENSIVE Collection Time: 11/04/19  4:11 AM  
Result Value Ref Range Sodium 130 (L) 136 - 145 mmol/L Potassium 4.2 3.5 - 5.1 mmol/L Chloride 95 (L) 97 - 108 mmol/L  
 CO2 34 (H) 21 - 32 mmol/L Anion gap 1 (L) 5 - 15 mmol/L Glucose 92 65 - 100 mg/dL BUN 25 (H) 6 - 20 MG/DL Creatinine 1.19 0.70 - 1.30 MG/DL  
 BUN/Creatinine ratio 21 (H) 12 - 20 GFR est AA >60 >60 ml/min/1.73m2 GFR est non-AA >60 >60 ml/min/1.73m2 Calcium 8.7 8.5 - 10.1 MG/DL Bilirubin, total 1.9 (H) 0.2 - 1.0 MG/DL  
 ALT (SGPT) 23 12 - 78 U/L  
 AST (SGOT) 50 (H) 15 - 37 U/L Alk. phosphatase 146 (H) 45 - 117 U/L Protein, total 5.8 (L) 6.4 - 8.2 g/dL Albumin 2.6 (L) 3.5 - 5.0 g/dL Globulin 3.2 2.0 - 4.0 g/dL A-G Ratio 0.8 (L) 1.1 - 2.2 PROTHROMBIN TIME + INR Collection Time: 11/04/19  4:11 AM  
Result Value Ref Range INR 1.1 0.9 - 1.1 Prothrombin time 11.3 (H) 9.0 - 11.1 sec PTT Collection Time: 11/04/19  4:11 AM  
Result Value Ref Range aPTT 23.4 22.1 - 32.0 sec  
 aPTT, therapeutic range     58.0 - 77.0 SECS  
TYPE & SCREEN Collection Time: 11/04/19  4:11 AM  
Result Value Ref Range Crossmatch Expiration 11/07/2019  ABO/Rh(D) O POSITIVE   
 Antibody screen POS Comment Previously identified Nonspecific antibody and nonspecific cold antibody Antibody ID NO ADDITIONAL ANTIBODIES DETECTED Unit number [id number] Blood component type RC LR Unit division 00 Status of unit ALLOCATED Crossmatch result Compatible MAGNESIUM Collection Time: 11/04/19  4:11 AM  
Result Value Ref Range Magnesium 2.4 1.6 - 2.4 mg/dL GLUCOSE, POC Collection Time: 11/04/19  6:57 AM  
Result Value Ref Range Glucose (POC) 98 65 - 100 mg/dL Performed by Joselito Gonsalez Assessment:  
· LVAD evaluation: Impella in place. · Iron deficiency anemia: Hgb 8.8, platelets 86. Status post IV iron. No PRBC transfusions. Bivalirudin on hold. No signs of active GI bleeding. · Thrombocytopenia: status post 1 unit platelets 93/52/23. Platelets stable at this time for procedures. · History of colon polyps: Colonoscopy in 2009 with reported tubular adenomas. · Acute on chronic systolic heart failure · History of VF arrest status post AICD · Coronary artery disease status post CABG in 2010 · Atrial fibrillation · Acute kidney injury on chronic kidney disease · Urinary tract infection: ID following. On Cefepime. Patient Active Problem List  
Diagnosis Code  Paroxysmal atrial fibrillation (HCC) I48.0  Acute on chronic systolic CHF (congestive heart failure) (HCC) I50.23  Systolic CHF, chronic (HCC) I50.22  
 Peripheral vascular disease (HCC) I73.9  Acute decompensated heart failure (HCC) I50.9 Plan: · Monitor CBC and transfuse as necessary · Timing of EGD/colonoscopy per Dr. Leatha Edwards Signed By: Speedy Silver, [address]   
 11/4/2019  9:49 AM 
  
Gastroenterology Attending Physician attestation statement and comments. This patient was seen and examined by me in a face-to-face visit today.  I reviewed the medical record including lab work, imaging and other provider notes. I confirmed the history as described above. I spoke to the patient, reviewed the medical record including lab work, imaging and other provider notes. I discussed this case in detail with catherine gama. I formulated an  assessment of this patient and developed a treatment plan. I agree with the above consultation note. I agree with the history, exam and assessment and plan as outlined in the note. I would like to add the following:  
Abdomen is soft Has iron deficiency anemia No evidence of active GI bleed No urgency for EGD and colonoscopy, needs clearance from cardiology to do them Will follow

## 2019-11-04 NOTE — PROGRESS NOTES
NYHA class IV A/C systolic heart failure Low EF (10%) Inotrope dependent Malnutrition  
LVAD Work-up Epistaxis Hematuria Still having issues with Epistaxis and Hematuria Off bivalirudin May need DDAVP On octreotide and doxycycline Hgb looks good Platelets low Creatinine normal 
 
Bilirubin and other LFTs look good Lactic acid and LDH reasonable NT pro-BNP coming down Discussed with patient and wife Needs to walk today Impella - flow 3.5 L/min @ P-6 Addendum OOB to a chair Epistaxis improved 
 
working on hematuria Intake/Output Summary (Last 24 hours) at 11/4/2019 1215 Last data filed at 11/4/2019 1000 Gross per 24 hour Intake 1136.73 ml Output 3985 ml Net -2848.27 ml Visit Vitals /83 Pulse 78 Temp 96.8 °F (36 °C) Resp 21 Ht 6' 2\" (1.88 m) Wt 185 lb 6.5 oz (84.1 kg) SpO2 90% BMI 23.80 kg/m² Risk of morbidity and mortality - high Medical decision making - high complexity Total critical care time - 30 minutes (CPT 75588)

## 2019-11-04 NOTE — PROGRESS NOTES
[facility] 
 [address] [facility], [address] [facility] Phone: [phone]   [phone]   
  
Nephrology Progress Note Sona Kiser     1950     [id number] Date of Admission : 10/25/2019 
11/04/19 CC:  Follow up for JUAN J, CKD, Hyponatremia Assessment and Plan JUAN J on CKD Stage IV : 
- likely from CRS 
- Cr stable and at baseline 
- stable UOP 
- diurese PRN 
- daily labs CKD IV at baseline w/ L renal atrophy: - NM scan shows equal function Gross hematuria: 
- slowly improving off angiomax 
- urology following Hyponatremia: 
- from hypervolemia and excessive fluid intake 
- stable Hoarseness, vocal cord paralysis, mediastinal adenopathy: 
- will need eventual PET/CT 
- per pulmonary 
  
BPH w/ urinary retention  
- neal in place 
  
Acute on Chronic HFrEF  
- EF 16-20%, NYHA Class IV , hx of VF arrest - s/p AICD 
- Impella insertion 10/29 
- ongoing w/u for LVAD - will need colonoscopy, LHC, PET/CT 
  
JOSE on CPAP  
  
PAF s/p DCCV 6/19 
  
Chronic Anemia: 
- from CKD and iron deficiency - s/p IV iron, now on PAVEL Interval History:   
Seen and examined. Cr stable. Off milrinone. Impella in place. Stable UOP. Ongoing hematuria and epistaxis this AM.  No cp, sob reported. Review of Systems: Pertinent items are noted in HPI. Current Medications:  
Current Facility-Administered Medications Medication Dose Route Frequency  insulin lispro (HUMALOG) injection   SubCUTAneous AC&HS  bumetanide (BUMEX) injection 1 mg  1 mg IntraVENous Q6H PRN  
 cefepime (MAXIPIME) 2 g in 0.9% sodium chloride (MBP/ADV) 100 mL  2 g IntraVENous Q12H  
 fluticasone propionate (FLONASE) 50 mcg/actuation nasal spray 2 Spray  2 Spray Both Nostrils DAILY  oxymetazoline (AFRIN) 0.05 % nasal spray 2 Spray  2 Spray Both Nostrils BID PRN  
 sodium chloride (OCEAN) 0.65 % nasal squeeze bottle 2 Spray  2 Spray Both Nostrils QID  alteplase (CATHFLO) 1 mg in dextrose 5% 50 mL impella purge solution  1 mg Other TITRATE  epoetin yvonne-epbx (RETACRIT) injection 10,000 Units  10,000 Units SubCUTAneous Q TUE, THU & SAT  pantoprazole (PROTONIX) tablet 40 mg  40 mg Oral ACB  dextrose 5% infusion  4-20 mL/hr IntraVENous CONTINUOUS  
 bivalirudin (ANGIOMAX) 250 mg in dextrose 5% 250 mL infusion  4-20 mL/hr Other TITRATE  alteplase (CATHFLO) 1 mg in sterile water (preservative free) 1 mL injection  1 mg InterCATHeter PRN  
 bacitracin 500 unit/gram packet 1 Packet  1 Packet Topical PRN  
 sodium chloride (NS) flush 5-40 mL  5-40 mL IntraVENous Q8H  
 sodium chloride (NS) flush 5-40 mL  5-40 mL IntraVENous PRN  
 0.45% sodium chloride infusion  10 mL/hr IntraVENous CONTINUOUS  
 0.9% sodium chloride infusion  10 mL/hr IntraVENous CONTINUOUS  
 oxyCODONE IR (ROXICODONE) tablet 5 mg  5 mg Oral Q4H PRN  
 oxyCODONE IR (ROXICODONE) tablet 10 mg  10 mg Oral Q4H PRN  
 morphine injection 4 mg  4 mg IntraVENous Q2H PRN  
 naloxone (NARCAN) injection 0.4 mg  0.4 mg IntraVENous PRN  
 ondansetron (ZOFRAN) injection 4 mg  4 mg IntraVENous Q4H PRN  
 albuterol (PROVENTIL VENTOLIN) nebulizer solution 2.5 mg  2.5 mg Nebulization Q4H PRN  chlorhexidine (PERIDEX) 0.12 % mouthwash 10 mL  10 mL Oral Q12H  
 magnesium oxide (MAG-OX) tablet 400 mg  400 mg Oral BID  calcium chloride 1 g in 0.9% sodium chloride 250 mL IVPB  1 g IntraVENous PRN  
 bisacodyl (DULCOLAX) suppository 10 mg  10 mg Rectal DAILY PRN  
 senna-docusate (PERICOLACE) 8.6-50 mg per tablet 1 Tab  1 Tab Oral BID  polyethylene glycol (MIRALAX) packet 17 g  17 g Oral DAILY  ELECTROLYTE REPLACEMENT NOTE: Nurse to review Serum Potassium and Magnesuim levels and Initiate Electrolyte Replacement Protocol as needed  1 Each Other PRN  
 magnesium sulfate 1 g/100 ml IVPB (premix or compounded)  1 g IntraVENous PRN  
 glucose chewable tablet 16 g  4 Tab Oral PRN  
  glucagon (GLUCAGEN) injection 1 mg  1 mg IntraMUSCular PRN  
 dextrose 10% infusion 0-250 mL  0-250 mL IntraVENous PRN  
 morphine injection 2 mg  2 mg IntraVENous Q2H PRN  
 melatonin tablet 3 mg  3 mg Oral QHS PRN  
 albumin human 5% (BUMINATE) solution 25 g  25 g IntraVENous Q2H PRN  
 amiodarone (CORDARONE) tablet 100 mg  100 mg Oral BID  influenza vaccine 2019-20 (6 mos+)(PF) (FLUARIX/FLULAVAL/FLUZONE QUAD) injection 0.5 mL  0.5 mL IntraMUSCular PRIOR TO DISCHARGE  
 [Held by provider] aspirin delayed-release tablet 81 mg  81 mg Oral DAILY  tamsulosin (FLOMAX) capsule 0.8 mg  0.8 mg Oral DAILY  allopurinol (ZYLOPRIM) tablet 100 mg  100 mg Oral DAILY No Known Allergies Objective: 
Vitals:   
Vitals:  
 11/04/19 0400 11/04/19 0500 11/04/19 0600 11/04/19 0700 BP: 106/87 (!) 105/93 103/83 91/78 Pulse: 86 80 71 76 Resp: 26 16 14 18 Temp: 98.6 °F (37 °C) SpO2: 97% 100% 100% 91% Weight: 84.1 kg (185 lb 6.5 oz) Height:      
 
Intake and Output: 
No intake/output data recorded. 11/02 1901 - 11/04 0700 In: 1807.2 [P.O.:240; I.V.:1157.2] Out: Enrigue Rump [[id number]:5351] Physical Examination: 
Pt intubated     No 
General: Awake and alert Neck:  Supple, no mass Resp:  Lungs few dependent rales CV:  RRR,  no murmur or rub, trace b/l LE edema GI:  Soft, NT, + Bowel sounds Neurologic:  AOx3, nonfocal exam 
Psych:             Normal mood and affect Skin:  No Rash :  Lizama w/ blood-tinged urine [x]    High complexity decision making was performed 
[]    Patient is at high-risk of decompensation with multiple organ involvement Lab Data Personally Reviewed: I have reviewed all the pertinent labs, microbiology data and radiology studies during assessment. Recent Labs 11/04/19 
0411 11/03/19 
9301 11/02/19 
9790 11/02/19 
0410 * 132*  --  131* K 4.2 3.9  --  3.9 CL 95* 97  --  93* CO2 34* 32  --  32  
GLU 92 87  --  96 BUN 25* 28*  --  32* CREA 1.19 1.16  --  1.22  
CA 8.7 8.4*  --  8.4* ALB 2.6* 2.7*  --  2.6* SGOT 50* 55*  --  46* ALT 23 15  --  13 INR 1.1 1.1 1.2*  --   
 
Recent Labs 11/04/19 
0411 11/03/19 
0332 11/02/19 
1213 11/02/19 
0410 WBC 5.5 4.9  --  4.6 HGB 8.8* 8.4* 9.3* 9.1*  
HCT 28.1* 27.1* 29.7* 28.7* PLT 86* 81*  --  91* No results found for: SDES Lab Results Component Value Date/Time Culture result: SERRATIA MARCESCENS (A) 10/31/2019 11:05 AM  
 Culture result: CHECKING FOR POSSIBLE 2ND GRAM NEGATIVE MAYTE (A) 10/31/2019 11:05 AM  
 Culture result: PROBABLE ENTEROCOCCUS SPECIES (1,000 COLONIES/mL) (A) 10/31/2019 11:05 AM  
 Culture result: NO GROWTH 5 DAYS 10/25/2019 07:14 PM  
 Culture result: ENTEROBACTER CLOACAE (A) 06/26/2019 12:25 PM  
 
Recent Results (from the past 24 hour(s)) GLUCOSE, POC Collection Time: 11/03/19 12:09 PM  
Result Value Ref Range Glucose (POC) 136 (H) 65 - 100 mg/dL Performed by Mae Santos, POC Collection Time: 11/03/19  5:13 PM  
Result Value Ref Range Glucose (POC) 127 (H) 65 - 100 mg/dL Performed by Mae Santos, POC Collection Time: 11/03/19 10:07 PM  
Result Value Ref Range Glucose (POC) 134 (H) 65 - 100 mg/dL Performed by Francisco Javier [facility]   
LD Collection Time: 11/04/19  4:11 AM  
Result Value Ref Range  (H) 85 - 241 U/L  
NT-PRO BNP Collection Time: 11/04/19  4:11 AM  
Result Value Ref Range NT pro-BNP 9,961 (H) <125 PG/ML  
LACTIC ACID Collection Time: 11/04/19  4:11 AM  
Result Value Ref Range Lactic acid 0.9 0.4 - 2.0 MMOL/L  
PROCALCITONIN Collection Time: 11/04/19  4:11 AM  
Result Value Ref Range Procalcitonin 0.2 ng/mL CBC W/O DIFF Collection Time: 11/04/19  4:11 AM  
Result Value Ref Range WBC 5.5 4.1 - 11.1 K/uL  
 RBC 3.03 (L) 4.10 - 5.70 M/uL HGB 8.8 (L) 12.1 - 17.0 g/dL HCT 28.1 (L) 36.6 - 50.3 %  MCV 92.7 80.0 - 99.0 FL  
 MCH 29.0 26.0 - 34.0 PG  
 MCHC 31.3 30.0 - 36.5 g/dL  
 RDW 18.6 (H) 11.5 - 14.5 % PLATELET 86 (L) 485 - 400 K/uL MPV 10.4 8.9 - 12.9 FL  
 NRBC 0.0 0  WBC ABSOLUTE NRBC 0.00 0.00 - 0.01 K/uL METABOLIC PANEL, COMPREHENSIVE Collection Time: 11/04/19  4:11 AM  
Result Value Ref Range Sodium 130 (L) 136 - 145 mmol/L Potassium 4.2 3.5 - 5.1 mmol/L Chloride 95 (L) 97 - 108 mmol/L  
 CO2 34 (H) 21 - 32 mmol/L Anion gap 1 (L) 5 - 15 mmol/L Glucose 92 65 - 100 mg/dL BUN 25 (H) 6 - 20 MG/DL Creatinine 1.19 0.70 - 1.30 MG/DL  
 BUN/Creatinine ratio 21 (H) 12 - 20 GFR est AA >60 >60 ml/min/1.73m2 GFR est non-AA >60 >60 ml/min/1.73m2 Calcium 8.7 8.5 - 10.1 MG/DL Bilirubin, total 1.9 (H) 0.2 - 1.0 MG/DL  
 ALT (SGPT) 23 12 - 78 U/L  
 AST (SGOT) 50 (H) 15 - 37 U/L Alk. phosphatase 146 (H) 45 - 117 U/L Protein, total 5.8 (L) 6.4 - 8.2 g/dL Albumin 2.6 (L) 3.5 - 5.0 g/dL Globulin 3.2 2.0 - 4.0 g/dL A-G Ratio 0.8 (L) 1.1 - 2.2 PROTHROMBIN TIME + INR Collection Time: 11/04/19  4:11 AM  
Result Value Ref Range INR 1.1 0.9 - 1.1 Prothrombin time 11.3 (H) 9.0 - 11.1 sec PTT Collection Time: 11/04/19  4:11 AM  
Result Value Ref Range aPTT 23.4 22.1 - 32.0 sec  
 aPTT, therapeutic range     58.0 - 77.0 SECS  
TYPE & SCREEN Collection Time: 11/04/19  4:11 AM  
Result Value Ref Range Crossmatch Expiration 11/07/2019 ABO/Rh(D) O POSITIVE Antibody screen POS Comment Previously identified Nonspecific antibody and nonspecific cold antibody Antibody ID NO ADDITIONAL ANTIBODIES DETECTED Unit number [id number] Blood component type RC LR Unit division 00 Status of unit ALLOCATED Crossmatch result Compatible GLUCOSE, POC Collection Time: 11/04/19  6:57 AM  
Result Value Ref Range Glucose (POC) 98 65 - 100 mg/dL Performed by Benito West Total time spent with patient:  xxx   min. Care Plan discussed with: 
Patient Family RN Consulting Physician [id number] [facility],      
 
I have reviewed the flowsheets. Chart and Pertinent Notes have been reviewed. No change in PMH ,family and social history from Consult note.  
 
 
Snow Salas MD

## 2019-11-04 NOTE — PROGRESS NOTES
Problem: Dysphagia (Adult) Goal: *Acute Goals and Plan of Care (Insert Text) Description Speech Pathology Initiated 10/28/19 1. Patient will tolerate regular diet/thin liquids without overt s/s of aspiration within 7 days MET 2. Patient will participate in [facility] for further objective assessment of swallow physiology within 7 days (once medically stable from Impella/cardiac sx standpoint) NOT MET; discontinue 11/4/2019 1009 by Jacob Pulliam, SLP Outcome: Resolved/Met Speech Pathology Following up from the weekend. RN reports excellent tolerance of meals, no overt s/s of aspiration. Discussed with Pako Lockhart, NP bedside and all agree to hold on MBS for now until more complex medical issues resolved. SLP to sign off for now. Helena Robles MS, CCC-SLP, BCS-S

## 2019-11-05 NOTE — PROGRESS NOTES
Urology Progress Note Patient: Tammie Vidal MRN: [id number]  SSN: [id number] YOB: 1950  Age: 71 y.o. Sex: male ADMITTED: 10/25/2019 to Maryann Thomas MD for [facility] [I50.9] POD# 7 Days Post-Op Procedure(s): RIGHT AXILLARY IMPELLA INSERTION Catheter draining well. Mild hematuria. No clots. Vitals: Temp (24hrs), Av.8 °F (37.1 °C), Min:98.7 °F (37.1 °C), Max:98.9 °F (37.2 °C) Blood pressure 96/83, pulse 77, temperature 98.9 °F (37.2 °C), resp. rate 23, height 6' 2\" (1.88 m), weight 84.5 kg (186 lb 4.6 oz), SpO2 99 %. Intake and Output: 
 1901 -  0700 In: 2146.7 [P.O.:120; I.V.:1316.7] Out: 5540 [[id number]] Labs: 
Labs:  
Lab Results Component Value Date/Time WBC 5.3 2019 04:12 AM  
 HGB 8.3 (L) 2019 04:12 AM  
 Creatinine 1.07 2019 04:12 AM  
 
 
 
Assessment/Plan: 
 Hematuria improving. Continue neal. Irrigate PRN. Signed By: Isaiah Reddy MD - 2019

## 2019-11-05 NOTE — PROGRESS NOTES
Calos Rueda 1721 Education with patient and patient's caregiver (Spouse: Fiordaliza Aiken). Reviewed LVAD terms and functions using teachback. Questions asked and answered by Jae Khan. Reviewed Coumadin and checking INR once home, clothing to accommodate LVAD equipment, and Home Health assistance. Molly Walker RN  
VAD Coordinator

## 2019-11-05 NOTE — PROGRESS NOTES
Problem: Mobility Impaired (Adult and Pediatric) Goal: *Acute Goals and Plan of Care (Insert Text) Description FUNCTIONAL STATUS PRIOR TO ADMISSION: Patient was modified independent using a single point cane for functional mobility. Patient reports an increasingly sedentary lifestyle 2* fatigue and SOB/dyspnea. Retired (so is his wife). Patient reports x 3 falls within the last couple of weeks. Patient is wearing either nasal cannula or CPAP at night. LVAD work-up has been initiated. HOME SUPPORT PRIOR TO ADMISSION: The patient lived with his wife, but did not require physical assistance. Physical Therapy Goals Initiated 10/27/2019 1. Patient will move from supine to sit and sit to supine, scoot up and down and roll side to side in bed with independence within 7 days. 2.  Patient will perform sit to/from stand with supervision/set-up within 7 days. 3.  Patient will ambulate 150 feet with least restrictive assistive device and supervision/set-up within 7 days. 4.  Patient will ascend/descend 4 stairs with  handrail(s) with supervision/set-up within 7 days for functional strengthening and community reintegration. Monica Dawson 5.  Patient will verbally and functionally recall 3/3 sternal precautions within 7 days in preparation for LVAD implantation. 6.  Patient will perform a mock power exchange for power module to/from battery with supervision/set-up within 7 days in preparation for LVAD implantation. Outcome: Progressing Towards Goal 
 PHYSICAL THERAPY TREATMENT Patient: Meg Vera (75 y.o. male) Date: 11/5/2019 Diagnosis: Acute decompensated heart failure ([facility] [I50.9] <principal problem not specified> Procedure(s) (LRB): 
RIGHT AXILLARY IMPELLA INSERTION (Right) 7 Days Post-Op Precautions: Fall(R axillary impella) Chart, physical therapy assessment, plan of care and goals were reviewed. ASSESSMENT Patient continues with skilled PT services and is progressing towards goals. Patient with improved gait tolerance and balance today. Able to ambulate without standing rest breaks and with less soft knee buckling. Sats stable on room with gait and exercise. Reviewed sternal precautions, this time with wife present and participated in functional mobility training outlined below with sternal precautions. Current Level of Function Impacting Discharge (mobility/balance): min A with RW for gait, impella P8 Other factors to consider for discharge: falls at home PTA, supportive family, LVAD workup PLAN : 
Patient continues to benefit from skilled intervention to address the above impairments. Continue treatment per established plan of care. to address goals. Recommendation for discharge: (in order for the patient to meet his/her long term goals) To be determined: based on medical course, LVAD evaluation underway This discharge recommendation: 
Has not yet been discussed the attending provider and/or case management IF patient discharges home will need the following DME: to be determined (TBD) SUBJECTIVE:  
Patient stated yes ma'am. OBJECTIVE DATA SUMMARY:  
Critical Behavior: 
Neurologic State: Alert Orientation Level: Oriented X4 Cognition: Appropriate for age attention/concentration Safety/Judgement: Awareness of environment, Insight into deficits Functional Mobility Training: 
Transfers: 
Sit to Stand: Contact guard assistance;Minimum assistance(min A when practicing without UEs ) Stand to Sit: Contact guard assistance Balance: 
Sitting: Intact Standing: Impaired; With support Standing - Static: Good Standing - Dynamic : Fair Ambulation/Gait Training: 
Distance (ft): 100 Feet (ft) Assistive Device: Gait belt;Walker, rolling Ambulation - Level of Assistance: Minimal assistance Gait Abnormalities: Decreased step clearance Therapeutic Exercises:  
Sit<>stand x4 without UEs in prep for sternal precautions Educated on seated LE exercises, LAQ and seated marches Pain Rating: 
Denied pain Activity Tolerance:  
Good, SpO2 stable on RA and observed SOB with activity Please refer to the flowsheet for vital signs taken during this treatment. After treatment patient left in no apparent distress:  
Sitting in chair, Call bell within reach and Caregiver / family present COMMUNICATION/COLLABORATION:  
The patients plan of care was discussed with: Occupational Therapist and Registered Nurse Claire Johnson, PT, DPT Time Calculation: 16 mins

## 2019-11-05 NOTE — PROGRESS NOTES
NYHA class IV A/C systolic heart failure Low EF (10%) Inotrope dependent Malnutrition  
LVAD Work-up Epistaxis Hematuria Epistaxis improved Still issues with hematuria Hgb looks good Platelets a little low Creatinine normal 
 
Bilirubin improved Other LFTs reasonable LDH and lactic acid look good Pro-calcitonin normal 
 
NT pro-BNP coming down Pre-albumin still fairly low CXR - mild pulmonary edema Impella - flow 3.8 L/min @ P-6 Intake/Output Summary (Last 24 hours) at 11/5/2019 1617 Last data filed at 11/5/2019 1400 Gross per 24 hour Intake 1112.8 ml Output 2090 ml Net -977.2 ml Visit Vitals BP 96/83 Pulse 77 Temp 98.9 °F (37.2 °C) Resp 23 Ht 6' 2\" (1.88 m) Wt 186 lb 4.6 oz (84.5 kg) SpO2 99% BMI 23.92 kg/m² Risk of morbidity and mortality - high Medical decision making - high complexity Total critical care time - 30 minutes (CPT 17182)

## 2019-11-05 NOTE — PROGRESS NOTES
1930: Bedside shift report received from [id number] [facility]. 
2000: Neal changed. Manual bladder irrigation with no large clots, neal draining bright red urine- 120 cc out. Will continue to monitor. 2200: Pt taken to CT with RN. Pt bladder scanned. 82 cc- of urine. 0400: am labs drawn. 0730: Bedside and Verbal shift change report given to 7 Karla Good (oncoming nurse) by Darrell Matute (offgoing nurse). Report included the following information SBAR, Kardex, Procedure Summary, Intake/Output, Accordion, Recent Results and Med Rec Status.

## 2019-11-05 NOTE — PROGRESS NOTES
[place] [address], [address] GI PROGRESS NOTE Will Junie Brock, [address] [facility] office [phone] NP/PA in-hospital cell phone M-F until 4:30PM 
After 5PM or on weekends, please call  for physician on call NAME: Milagro Holley :  1950 MRN:  [id number] Subjective:  
Patient is sitting in the chair. No nausea, vomiting, or abdominal pain. He reports a bowel movement this morning. No melena or hematochezia. Objective: VITALS:  
Last 24hrs VS reviewed since prior progress note. Most recent are: 
Visit Vitals /81 (BP 1 Location: Right arm, BP Patient Position: At rest) Pulse 73 Temp 98.9 °F (37.2 °C) Resp 19 Ht 6' 2\" (1.88 m) Wt 84.5 kg (186 lb 4.6 oz) SpO2 100% BMI 23.92 kg/m² PHYSICAL EXAM: 
General:          Cooperative, no acute distress   
Neurologic:      Alert and oriented HEENT:           EOMI, no scleral icterus Lungs:             Diminished bilaterally anteriorly Heart:              S1 S2 Abdomen:        Soft, non-distended, no tenderness, no guarding, no rebound. +Bowel sounds. Lizama with blood. Extremities:     Warm Psych:             Not anxious or agitated Lab Data Reviewed:  
 
Recent Results (from the past 24 hour(s)) ECHO ADULT COMPLETE Collection Time: 19 11:00 AM  
Result Value Ref Range LA Volume 114.22 18 - 58 mL  
 LV E' Lateral Velocity 7.98 cm/s LV E' Septal Velocity 3.32 cm/s Tapse 1.73 1.5 - 2.0 cm LVIDd 6.99 (A) 4.2 - 5.9 cm  
 LVPWd 1.17 (A) 0.6 - 1.0 cm LVIDs 6.47 cm IVSd 0.92 0.6 - 1.0 cm  
 LV ED Vol A2C 287.0 mL  
 LV ES Vol A4C 173.6 mL  
 LV ES Vol .0 (A) 22 - 58 mL  
 MVA (PHT) 3.8 cm2  
 MV A Isaac 30.63 cm/s  
 MV E Isaac 87.31 cm/s  
 MV E/A 2.85 RVIDd 5.84 cm  
 BP EF 21.3 (A) 55 - 100 % LV Ejection Fraction MOD 4C 17 % LV Ejection Fraction MOD 2C 22 % LA Vol 4C 91.99 (A) 18 - 58 mL LA Vol 2C 122.31 (A) 18 - 58 mL  
 LA Area 4C 28.2 cm2 LV Mass .1 (A) 88 - 224 g LV Mass AL Index 194.4 49 - 115 g/m2 E/E' lateral 10.94   
 E/E' septal 26.30   
 RVSP 72.7 mmHg E/E' ratio (averaged) 18.62   
 LV ES Vol A2C 223.2 mL  
 LVES Vol Index BP 96.9 mL/m2 LV ED Vol A4C 209.9 mL  
 LVED Vol Index .2 mL/m2 Est. RA Pressure 15.0 mmHg Mitral Regurgitant Peak Velocity 416.67 cm/s Mitral Valve E Wave Deceleration Time 197.4 ms Mitral Valve Pressure Half-time 57.2 ms Left Atrium Major Axis 5.09 cm Triscuspid Valve Regurgitation Peak Gradient 57.7 mmHg Pulmonic Valve Max Velocity 52.11 cm/s LV ED Vol .2 (A) 67 - 155 ml  
 TR Max Velocity 379.65 cm/s  
 LA Vol Index 54.28 16 - 28 ml/m2 PASP 72.7 mmHg LA Vol Index 58.12 16 - 28 ml/m2 LA Vol Index 43.72 16 - 28 ml/m2 LVED Vol Index A4C 99.7 mL/m2 LVED Vol Index A2C 136.4 mL/m2 LVES Vol Index A4C 82.5 mL/m2 LVES Vol Index A2C 106.1 mL/m2 MR Peak Gradient 69.4 mmHg Left Ventricular Fractional Shortening by 2D [id number] % PV End Diastolic Velocity 1.4 mmHg Mitral Valve Deceleration [place] [id number] Pulmonary Artery Mean Presure 39.3 mmHg PI End Diastolic Pressure 74.4 mmHg PV peak gradient 1.1 mmHg GLUCOSE, POC Collection Time: 11/04/19 12:16 PM  
Result Value Ref Range Glucose (POC) 158 (H) 65 - 100 mg/dL Performed by Dnag Catalan, POC Collection Time: 11/04/19  4:45 PM  
Result Value Ref Range Glucose (POC) 115 (H) 65 - 100 mg/dL Performed by Dang Catalan, POC Collection Time: 11/04/19  9:00 PM  
Result Value Ref Range Glucose (POC) 136 (H) 65 - 100 mg/dL Performed by Ronda BRAVO Collection Time: 11/05/19  4:12 AM  
Result Value Ref Range  (H) 85 - 241 U/L  
NT-PRO BNP Collection Time: 11/05/19  4:12 AM  
Result Value Ref Range  NT pro-BNP 8,309 (H) <125 PG/ML  
 LACTIC ACID Collection Time: 11/05/19  4:12 AM  
Result Value Ref Range Lactic acid 0.8 0.4 - 2.0 MMOL/L  
CBC W/O DIFF Collection Time: 11/05/19  4:12 AM  
Result Value Ref Range WBC 5.3 4.1 - 11.1 K/uL  
 RBC 2.83 (L) 4.10 - 5.70 M/uL HGB 8.3 (L) 12.1 - 17.0 g/dL HCT 26.4 (L) 36.6 - 50.3 % MCV 93.3 80.0 - 99.0 FL  
 MCH 29.3 26.0 - 34.0 PG  
 MCHC 31.4 30.0 - 36.5 g/dL  
 RDW 18.7 (H) 11.5 - 14.5 % PLATELET 80 (L) 524 - 400 K/uL MPV 10.9 8.9 - 12.9 FL  
 NRBC 0.0 0  WBC ABSOLUTE NRBC 0.00 0.00 - 0.01 K/uL METABOLIC PANEL, COMPREHENSIVE Collection Time: 11/05/19  4:12 AM  
Result Value Ref Range Sodium 128 (L) 136 - 145 mmol/L Potassium 4.3 3.5 - 5.1 mmol/L Chloride 93 (L) 97 - 108 mmol/L  
 CO2 33 (H) 21 - 32 mmol/L Anion gap 2 (L) 5 - 15 mmol/L Glucose 90 65 - 100 mg/dL BUN 25 (H) 6 - 20 MG/DL Creatinine 1.07 0.70 - 1.30 MG/DL  
 BUN/Creatinine ratio 23 (H) 12 - 20 GFR est AA >60 >60 ml/min/1.73m2 GFR est non-AA >60 >60 ml/min/1.73m2 Calcium 8.5 8.5 - 10.1 MG/DL Bilirubin, total 1.8 (H) 0.2 - 1.0 MG/DL  
 ALT (SGPT) 28 12 - 78 U/L  
 AST (SGOT) 50 (H) 15 - 37 U/L Alk. phosphatase 143 (H) 45 - 117 U/L Protein, total 5.5 (L) 6.4 - 8.2 g/dL Albumin 2.4 (L) 3.5 - 5.0 g/dL Globulin 3.1 2.0 - 4.0 g/dL A-G Ratio 0.8 (L) 1.1 - 2.2 PROTHROMBIN TIME + INR Collection Time: 11/05/19  4:12 AM  
Result Value Ref Range INR 1.1 0.9 - 1.1 Prothrombin time 11.4 (H) 9.0 - 11.1 sec PTT Collection Time: 11/05/19  4:12 AM  
Result Value Ref Range aPTT 29.2 22.1 - 32.0 sec  
 aPTT, therapeutic range     58.0 - 77.0 SECS  
CRP, HIGH SENSITIVITY Collection Time: 11/05/19  4:12 AM  
Result Value Ref Range CRP, High sensitivity >9.5 mg/L MAGNESIUM Collection Time: 11/05/19  4:12 AM  
Result Value Ref Range Magnesium 2.4 1.6 - 2.4 mg/dL GLUCOSE, POC  Collection Time: 11/05/19  7:12 AM  
 Result Value Ref Range Glucose (POC) 152 (H) 65 - 100 mg/dL Performed by Jovanny Barry Assessment:  
· LVAD evaluation: Impella in place. · Iron deficiency anemia: Hgb 8.3, platelets 80. Status post IV iron. No PRBC transfusions. Bivalirudin on hold. No signs of active GI bleeding. · Thrombocytopenia: status post 1 unit platelets 97/46/70. Platelets 80 - acceptable for procedures. · History of colon polyps: Colonoscopy in 2009 with reported tubular adenomas. · Acute on chronic systolic heart failure · History of VF arrest status post AICD · Coronary artery disease status post CABG in 2010 · Atrial fibrillation · Acute kidney injury on chronic kidney disease · Urinary tract infection/hematuria: ID following. On Cefepime. Patient Active Problem List  
Diagnosis Code  Paroxysmal atrial fibrillation (HCC) I48.0  Acute on chronic systolic CHF (congestive heart failure) (HCC) I50.23  Systolic CHF, chronic (HCC) I50.22  
 Peripheral vascular disease (HCC) I73.9  Acute decompensated heart failure (HCC) I50.9 Plan: · Monitor CBC. No signs of active GI bleeding. · Timing of EGD and colonoscopy per Dr. Domenic Lee Signed By: MALCOLM Reed   
 11/5/2019  10:07 AM 
  
 
I have examined the patient. I have reviewed the chart and agree with the documentation recorded by the NP, including the assessment, treatment plan, and disposition.  
 
 
 
Tommie Haley MD

## 2019-11-05 NOTE — PROGRESS NOTES
[facility] 
 [address] [facility], [address] [facility] Phone: [phone]   [phone]   
  
Nephrology Progress Note Sona Kiser     1950     [id number] Date of Admission : 10/25/2019 
11/05/19 CC:  Follow up for JUAN J, CKD, Hyponatremia Assessment and Plan JUAN J on CKD Stage IV : 
- likely from CRS 
- Cr stable and at baseline 
- stable UOP 
- diurese PRN 
- daily labs CKD IV at baseline w/ L renal atrophy: - NM scan shows equal function Gross hematuria: 
- slowly improving off angiomax 
- urology following Serratia and Enteroccocus UTI: 
- per ID Hyponatremia: 
- from hypervolemia and excessive fluid intake - FR and diurese PRN 
- daily Na levels Hoarseness, vocal cord paralysis, mediastinal adenopathy: 
- will need eventual PET/CT 
- per pulmonary 
  
BPH w/ urinary retention  
- neal in place 
  
Acute on Chronic HFrEF  
- EF 16-20%, NYHA Class IV , hx of VF arrest - s/p AICD 
- Impella insertion 10/29 
- LVAD eval underwaty 
  
JOSE on CPAP  
  
PAF s/p DCCV 6/19 
  
Chronic Anemia: 
- from CKD and iron deficiency - s/p IV iron, now on PAVEL Interval History:   
Seen and examined. Cr stable. Impella support ongoing. UOP > 3.5 L in the past 4 hours. Comfortable, no cp or sob. Still w/ hematuria, catheter had to be changed yesterday. Review of Systems: Pertinent items are noted in HPI. Current Medications:  
Current Facility-Administered Medications Medication Dose Route Frequency  piperacillin-tazobactam (ZOSYN) 3.375 g in 0.9% sodium chloride (MBP/ADV) 100 mL  3.375 g IntraVENous Q8H  
 insulin lispro (HUMALOG) injection   SubCUTAneous AC&HS  bumetanide (BUMEX) injection 1 mg  1 mg IntraVENous Q6H PRN  
 fluticasone propionate (FLONASE) 50 mcg/actuation nasal spray 2 Spray  2 Spray Both Nostrils DAILY  oxymetazoline (AFRIN) 0.05 % nasal spray 2 Spray  2 Spray Both Nostrils BID PRN  
  sodium chloride (OCEAN) 0.65 % nasal squeeze bottle 2 Spray  2 Spray Both Nostrils QID  alteplase (CATHFLO) 1 mg in dextrose 5% 50 mL impella purge solution  1 mg Other TITRATE  epoetin yvonne-epbx (RETACRIT) injection 10,000 Units  10,000 Units SubCUTAneous Q TUE, THU & SAT  pantoprazole (PROTONIX) tablet 40 mg  40 mg Oral ACB  dextrose 5% infusion  4-20 mL/hr IntraVENous CONTINUOUS  
 bivalirudin (ANGIOMAX) 250 mg in dextrose 5% 250 mL infusion  4-20 mL/hr Other TITRATE  alteplase (CATHFLO) 1 mg in sterile water (preservative free) 1 mL injection  1 mg InterCATHeter PRN  
 bacitracin 500 unit/gram packet 1 Packet  1 Packet Topical PRN  
 sodium chloride (NS) flush 5-40 mL  5-40 mL IntraVENous Q8H  
 sodium chloride (NS) flush 5-40 mL  5-40 mL IntraVENous PRN  
 0.45% sodium chloride infusion  10 mL/hr IntraVENous CONTINUOUS  
 0.9% sodium chloride infusion  10 mL/hr IntraVENous CONTINUOUS  
 oxyCODONE IR (ROXICODONE) tablet 5 mg  5 mg Oral Q4H PRN  
 oxyCODONE IR (ROXICODONE) tablet 10 mg  10 mg Oral Q4H PRN  
 morphine injection 4 mg  4 mg IntraVENous Q2H PRN  
 naloxone (NARCAN) injection 0.4 mg  0.4 mg IntraVENous PRN  
 ondansetron (ZOFRAN) injection 4 mg  4 mg IntraVENous Q4H PRN  
 albuterol (PROVENTIL VENTOLIN) nebulizer solution 2.5 mg  2.5 mg Nebulization Q4H PRN  chlorhexidine (PERIDEX) 0.12 % mouthwash 10 mL  10 mL Oral Q12H  
 magnesium oxide (MAG-OX) tablet 400 mg  400 mg Oral BID  calcium chloride 1 g in 0.9% sodium chloride 250 mL IVPB  1 g IntraVENous PRN  
 bisacodyl (DULCOLAX) suppository 10 mg  10 mg Rectal DAILY PRN  
 senna-docusate (PERICOLACE) 8.6-50 mg per tablet 1 Tab  1 Tab Oral BID  polyethylene glycol (MIRALAX) packet 17 g  17 g Oral DAILY  ELECTROLYTE REPLACEMENT NOTE: Nurse to review Serum Potassium and Magnesuim levels and Initiate Electrolyte Replacement Protocol as needed  1 Each Other PRN  
  magnesium sulfate 1 g/100 ml IVPB (premix or compounded)  1 g IntraVENous PRN  
 glucose chewable tablet 16 g  4 Tab Oral PRN  
 glucagon (GLUCAGEN) injection 1 mg  1 mg IntraMUSCular PRN  
 dextrose 10% infusion 0-250 mL  0-250 mL IntraVENous PRN  
 morphine injection 2 mg  2 mg IntraVENous Q2H PRN  
 melatonin tablet 3 mg  3 mg Oral QHS PRN  
 albumin human 5% (BUMINATE) solution 25 g  25 g IntraVENous Q2H PRN  
 amiodarone (CORDARONE) tablet 100 mg  100 mg Oral BID  influenza vaccine 2019-20 (6 mos+)(PF) (FLUARIX/FLULAVAL/FLUZONE QUAD) injection 0.5 mL  0.5 mL IntraMUSCular PRIOR TO DISCHARGE  
 [Held by provider] aspirin delayed-release tablet 81 mg  81 mg Oral DAILY  tamsulosin (FLOMAX) capsule 0.8 mg  0.8 mg Oral DAILY  allopurinol (ZYLOPRIM) tablet 100 mg  100 mg Oral DAILY No Known Allergies Objective: 
Vitals:   
Vitals:  
 11/05/19 0400 11/05/19 0500 11/05/19 0600 11/05/19 0700 BP: (!) 87/76 102/85 (!) 84/73 116/71 Pulse: 82  82 Resp: 20  20 Temp: 98.9 °F (37.2 °C) SpO2: 96%  97% Weight:      
Height:      
 
Intake and Output: 
No intake/output data recorded. 11/03 1901 - 11/05 0700 In: 2146.7 [P.O.:120; I.V.:1316.7] Out: 5540 [EPTBS:2103] Physical Examination: 
Pt intubated     No 
General: Awake and alert Neck:  Supple, no mass Resp:  Lungs few dependent rales CV:  RRR,  no murmur or rub, trace b/l LE edema GI:  Soft, NT, + Bowel sounds Neurologic:  AOx3, nonfocal exam 
Psych:             Normal mood and affect Skin:  No Rash :  Lizama w/ blood-tinged urine [x]    High complexity decision making was performed 
[]    Patient is at high-risk of decompensation with multiple organ involvement Lab Data Personally Reviewed: I have reviewed all the pertinent labs, microbiology data and radiology studies during assessment. Recent Labs 11/05/19 
2653 11/04/19 
0411 11/03/19 
7429 * 130* 132* K 4.3 4.2 3.9 CL 93* 95* 97  
 CO2 33* 34* 32  
GLU 90 92 87 BUN 25* 25* 28* CREA 1.07 1.19 1.16  
CA 8.5 8.7 8.4* MG 2.4 2.4  --   
ALB 2.4* 2.6* 2.7* SGOT 50* 50* 55* ALT 28 23 15 INR 1.1 1.1 1.1 Recent Labs 11/05/19 
0176 11/04/19 
0411 11/03/19 
0332 11/02/19 
1213 WBC 5.3 5.5 4.9  --   
HGB 8.3* 8.8* 8.4* 9.3* HCT 26.4* 28.1* 27.1* 29.7* PLT 80* 86* 81*  -- No results found for: SDES Lab Results Component Value Date/Time Culture result: SERRATIA MARCESCENS (A) 10/31/2019 10:05 AM  
 Culture result: SERRATIA MARCESCENS (2ND COLONY TYPE/STRAIN) (A) 10/31/2019 10:05 AM  
 Culture result: ENTEROCOCCUS FAECALIS GROUP D (A) 10/31/2019 10:05 AM  
 Culture result: NO GROWTH 5 DAYS 10/25/2019 07:14 PM  
 Culture result: ENTEROBACTER CLOACAE (A) 06/26/2019 12:25 PM  
 
Recent Results (from the past 24 hour(s)) ECHO ADULT COMPLETE Collection Time: 11/04/19 11:00 AM  
Result Value Ref Range LA Volume 114.22 18 - 58 mL  
 LV E' Lateral Velocity 7.98 cm/s LV E' Septal Velocity 3.32 cm/s Tapse 1.73 1.5 - 2.0 cm LVIDd 6.99 (A) 4.2 - 5.9 cm  
 LVPWd 1.17 (A) 0.6 - 1.0 cm LVIDs 6.47 cm IVSd 0.92 0.6 - 1.0 cm  
 LV ED Vol A2C 287.0 mL  
 LV ES Vol A4C 173.6 mL  
 LV ES Vol .0 (A) 22 - 58 mL  
 MVA (PHT) 3.8 cm2  
 MV A Isaac 30.63 cm/s  
 MV E Isaac 87.31 cm/s  
 MV E/A 2.85 RVIDd 5.84 cm  
 BP EF 21.3 (A) 55 - 100 % LV Ejection Fraction MOD 4C 17 % LV Ejection Fraction MOD 2C 22 % LA Vol 4C 91.99 (A) 18 - 58 mL  
 LA Vol 2C 122.31 (A) 18 - 58 mL  
 LA Area 4C 28.2 cm2 LV Mass .1 (A) 88 - 224 g LV Mass AL Index 194.4 49 - 115 g/m2 E/E' lateral 10.94   
 E/E' septal 26.30   
 RVSP 72.7 mmHg E/E' ratio (averaged) 18.62   
 LV ES Vol A2C 223.2 mL  
 LVES Vol Index BP 96.9 mL/m2 LV ED Vol A4C 209.9 mL  
 LVED Vol Index .2 mL/m2 Est. RA Pressure 15.0 mmHg Mitral Regurgitant Peak Velocity 416.67 cm/s Mitral Valve E Wave Deceleration Time 197.4 ms Mitral Valve Pressure Half-time 57.2 ms Left Atrium Major Axis 5.09 cm Triscuspid Valve Regurgitation Peak Gradient 57.7 mmHg Pulmonic Valve Max Velocity 52.11 cm/s LV ED Vol .2 (A) 67 - 155 ml  
 TR Max Velocity 379.65 cm/s  
 LA Vol Index 54.28 16 - 28 ml/m2 PASP 72.7 mmHg LA Vol Index 58.12 16 - 28 ml/m2 LA Vol Index 43.72 16 - 28 ml/m2 LVED Vol Index A4C 99.7 mL/m2 LVED Vol Index A2C 136.4 mL/m2 LVES Vol Index A4C 82.5 mL/m2 LVES Vol Index A2C 106.1 mL/m2 MR Peak Gradient 69.4 mmHg Left Ventricular Fractional Shortening by 2D 5.0696530336 % PV End Diastolic Velocity 1.4 mmHg Mitral Valve Deceleration [place] [id number] Pulmonary Artery Mean Presure 39.3 mmHg PI End Diastolic Pressure 91.5 mmHg PV peak gradient 1.1 mmHg GLUCOSE, POC Collection Time: 11/04/19 12:16 PM  
Result Value Ref Range Glucose (POC) 158 (H) 65 - 100 mg/dL Performed by Davis Zimmer, POC Collection Time: 11/04/19  4:45 PM  
Result Value Ref Range Glucose (POC) 115 (H) 65 - 100 mg/dL Performed by Davis Zimmer, POC Collection Time: 11/04/19  9:00 PM  
Result Value Ref Range Glucose (POC) 136 (H) 65 - 100 mg/dL Performed by Sena BRAVO Collection Time: 11/05/19  4:12 AM  
Result Value Ref Range  (H) 85 - 241 U/L  
NT-PRO BNP Collection Time: 11/05/19  4:12 AM  
Result Value Ref Range NT pro-BNP 8,309 (H) <125 PG/ML  
LACTIC ACID Collection Time: 11/05/19  4:12 AM  
Result Value Ref Range Lactic acid 0.8 0.4 - 2.0 MMOL/L  
CBC W/O DIFF Collection Time: 11/05/19  4:12 AM  
Result Value Ref Range WBC 5.3 4.1 - 11.1 K/uL  
 RBC 2.83 (L) 4.10 - 5.70 M/uL HGB 8.3 (L) 12.1 - 17.0 g/dL HCT 26.4 (L) 36.6 - 50.3 % MCV 93.3 80.0 - 99.0 FL  
 MCH 29.3 26.0 - 34.0 PG  
 MCHC 31.4 30.0 - 36.5 g/dL  
 RDW 18.7 (H) 11.5 - 14.5 % PLATELET 80 (L) 997 - 400 K/uL MPV 10.9 8.9 - 12.9 FL  
 NRBC 0.0 0  WBC ABSOLUTE NRBC 0.00 0.00 - 0.01 K/uL METABOLIC PANEL, COMPREHENSIVE Collection Time: 11/05/19  4:12 AM  
Result Value Ref Range Sodium 128 (L) 136 - 145 mmol/L Potassium 4.3 3.5 - 5.1 mmol/L Chloride 93 (L) 97 - 108 mmol/L  
 CO2 33 (H) 21 - 32 mmol/L Anion gap 2 (L) 5 - 15 mmol/L Glucose 90 65 - 100 mg/dL BUN 25 (H) 6 - 20 MG/DL Creatinine 1.07 0.70 - 1.30 MG/DL  
 BUN/Creatinine ratio 23 (H) 12 - 20 GFR est AA >60 >60 ml/min/1.73m2 GFR est non-AA >60 >60 ml/min/1.73m2 Calcium 8.5 8.5 - 10.1 MG/DL Bilirubin, total 1.8 (H) 0.2 - 1.0 MG/DL  
 ALT (SGPT) 28 12 - 78 U/L  
 AST (SGOT) 50 (H) 15 - 37 U/L Alk. phosphatase 143 (H) 45 - 117 U/L Protein, total 5.5 (L) 6.4 - 8.2 g/dL Albumin 2.4 (L) 3.5 - 5.0 g/dL Globulin 3.1 2.0 - 4.0 g/dL A-G Ratio 0.8 (L) 1.1 - 2.2 PROTHROMBIN TIME + INR Collection Time: 11/05/19  4:12 AM  
Result Value Ref Range INR 1.1 0.9 - 1.1 Prothrombin time 11.4 (H) 9.0 - 11.1 sec PTT Collection Time: 11/05/19  4:12 AM  
Result Value Ref Range aPTT 29.2 22.1 - 32.0 sec  
 aPTT, therapeutic range     58.0 - 77.0 SECS  
CRP, HIGH SENSITIVITY Collection Time: 11/05/19  4:12 AM  
Result Value Ref Range CRP, High sensitivity >9.5 mg/L MAGNESIUM Collection Time: 11/05/19  4:12 AM  
Result Value Ref Range Magnesium 2.4 1.6 - 2.4 mg/dL GLUCOSE, POC Collection Time: 11/05/19  7:12 AM  
Result Value Ref Range Glucose (POC) 152 (H) 65 - 100 mg/dL Performed by Mary Butler Total time spent with patient:  xxx   min. Care Plan discussed with: 
Patient Family RN Consulting Physician [address] [facility],      
 
I have reviewed the flowsheets. Chart and Pertinent Notes have been reviewed. No change in PMH ,family and social history from Consult note.  
 
 
Alex Cantu MD

## 2019-11-05 NOTE — PROGRESS NOTES
[facility] ICU Progress Note Admit Date: 10/25/2019 POD:  5 Day Post-Op Procedure:  Procedure(s): RIGHT AXILLARY IMPELLA INSERTION Subjective:  
Pt seen with Dr. Soraya Eckert. On NC 2L, resting in bed. Afebrile. Impella P8, D5 purge. Lizama in place-PRN bladder irrigation. Lizama had to be replaced overnight --wasn't draining properly. Objective:  
Vitals: 
Blood pressure 116/71, pulse 82, temperature 98.9 °F (37.2 °C), resp. rate 20, height 6' 2\" (1.88 m), weight 186 lb 4.6 oz (84.5 kg), SpO2 97 %. Temp (24hrs), Av.8 °F (37.1 °C), Min:98.7 °F (37.1 °C), Max:98.9 °F (37.2 °C) Hemodynamics: 
 CO: CO (l/min): 8.3 l/min CI: CI (l/min/m2): 4 l/min/m2 CVP: CVP (mmHg): 4 mmHg (19) SVR: SVR (dyne*sec)/cm5: 781 (dyne*sec)/cm5 (19) PAP Systolic: PAP Systolic: 45 (62 2736) PAP Diastolic: PAP Diastolic: 19 ( 1890) PVR:   
 SV02: SVO2 (%): 51 % (19) SCV02: SCVO2 (%): 75 % (10/29/19 1900) EKG/Rhythm:   
NSR Oxygen Therapy: 
Oxygen Therapy O2 Sat (%): 97 % (19) Pulse via Oximetry: 83 beats per minute (19) O2 Device: Nasal cannula (19) O2 Flow Rate (L/min): 2 l/min (19) FIO2 (%): 40 % (10/30/19 0846) CXR:  
CXR Results  (Last 48 hours) 19  XR CHEST PORT Final result Impression:  IMPRESSION:  
1. Persistent unchanged mild pulmonary edema Narrative:  EXAM: XR CHEST PORT INDICATION: postop heart, cardiac assist device COMPARISON: 2019 FINDINGS: A portable AP radiograph of the chest was obtained at 0419 hours. The  
patient is on a cardiac monitor. The cardiac assist device and right-sided PICC  
line and pacemaker are in satisfactory position. There is no change in mild  
cardiomegaly. There is no change in mild pulmonary edema. No pneumothorax. 19 0526  XR CHEST PORT Final result  Impression:  IMPRESSION:  
 1. No change mild pulmonary edema Narrative:  EXAM: XR CHEST PORT INDICATION: postop heart surgery, cardiac assist device COMPARISON: 11/3/2019 FINDINGS: A portable AP radiograph of the chest was obtained at 0445 hours. The  
patient is on a cardiac monitor. The [place]-Russ catheter has been removed. Right IJ cordis catheter, PICC line and cardiac assist device are in satisfactory  
position. ICD remains in place. There is no change in mild cardiomegaly and mild  
pulmonary edema. Admission Weight: Last Weight Weight: 192 lb 10.9 oz (87.4 kg) Weight: 186 lb 4.6 oz (84.5 kg) Intake / Output / Drain: 
Current Shift: No intake/output data recorded. Last 24 hrs.:  
 
Intake/Output Summary (Last 24 hours) at 2019 7017 Last data filed at 2019 0700 Gross per 24 hour Intake 1266.5 ml Output 2040 ml Net -773.5 ml EXAM: 
General:   NAD Lungs:   Diminished. Incision:  Impella dressing C/D/I. Heart:  Regular rate and rhythm, S1, S2 normal, no murmur, click, rub or gallop. Abdomen:   Soft, non-tender. Bowel sounds hypoactive. No masses,  No organomegaly. Extremities: Mod edema. PPP. Neurologic:  Gross motor and sensory apparatus intact. Labs:  
Recent Labs 19 
9150 19 
4815 WBC  --  5.3 HGB  --  8.3* HCT  --  26.4*  
PLT  --  80* NA  --  128* K  --  4.3 BUN  --  25* CREA  --  1.07  
GLU  --  90  
GLUCPOC 152*  --   
INR  --  1.1 Assessment:  
 
Active Problems: 
  Acute decompensated heart failure ([facility] [address] (10/25/2019) Plan/Recommendations/Medical Decision Makin. Acute on chronic systolic (CHF Class IV) S/P Impella: Has AICD. LV EF 16-20%. No BB/ACE/ARB/AA until appropriate. Bumex 1 mg IV QID. Trend proBNP, lactate, LDH. Strict I/Os. Daily weights. LVAD w/u in process. Cefepime changed to zosyn per ID for prophylaxis. Hold bival purge due to hematuria/epistaxis 2. Thrombocytopenia: Platelets 81V. No asa. HIT negative, LOAN pending. On protonix. Heme following. 3. CAD S/p CABG 2010: Needs [facility], plans for next week w/ Dr. Paulina Bobby. Stop asa d/t hematuria/thrombocytopenia, not on statin historically. No BB D/t pressors/intropes. 4. PAF s/p DCCV 6/2019: Holding eliquis due to hematuria/epistaxis. On PO amio. 5. JUAN J on CKD stage IV: Monitor. Renal following. Diuretics scheduled. Keep neal. 6. Hx of urinary retention/BPH, hematuria:  On flomax. Neal for bladder irrigation PRN, hold off on CBI per urology for now. Urology following. 7. JOSE: qhs CPAP. 8. Hx of sternal wound infection requring sternectomy: Not a contraindication for future LVAD. Tagged WBC -- showed no abnormal tracer uptake. 9. Iron def anemia: s/p venofer. On Epogen. Cannot use doxy/octreotide d/t prolonged QTC. 10. Vocal cord paralysis: Voice improving. Speech eval PRN. Advance diet as tolerated. 11. Constipation: last BM 10/31. On pericolace, miralax(pt keeps refusing). Add daily supp. Recheck KUB 12. Serratia UTI/hematuria: Neal irrigation, urology following. On cefepime. 13. Mediastinal lymphadenopathy:  Per AHF, low threshold for Carcinoma per CT scans. Eventually needs PET scan. 14. Hyponatremia: monitor 15. Acute Moderate protein-calorie malnutrition:  Pre-albumin 10/25 was 13.9. Recheck 11/4 was 16.2. Pt eating cardiac diet well. Dispo: Care and orders per [facility]. Remain in CCU.  
 
Signed By: Clementina Vallejo NP

## 2019-11-05 NOTE — PROGRESS NOTES
[facility] Progress Note DOS:  11/5/2019 REFERRING PROVIDER:  Caridad Moralez MD 
PRIMARY CARE PHYSICIAN: Katie Ramachandran MD 
PRIMARY CARDIOLOGIST: Caridad Moralez MD 
 
 
 
CC: DOUGLAS, nohemy HPI: Natasha Flanagan is a 71y.o. year old pleasant white male with a history of HTN, HLD, JOSE, CAD s/p cardiac arrest VFib s/p CABG (2011) c/b sternal would infection and sternectomy, ischemic cardiomyopathy LVEF 15-20%, s/p ICD and with LBBB. He was admitted with acute on chronic systolic heart failure with massive volume overload > 20 lbs, in the setting of atrial fibrillation s/p failed DCCV and underwent DCCV and RHC on 6/18. S/p BiVICD on 6/21/19 with Dr. Claus Stovall. He was discharged home home on IV milrinone on 6/26/19. He has been followed closely by Dr. Aura Sorto and the [facility]. Mr. Darrell Olivier returns to clinic for follow up with his wife. His dyspnea has progressed to the point he is sitting in a chair most of the day. He is unable to perform simple ADLs without limiting dyspnea. His appetite is down. He underwent a sleep study and is using CPAP but has difficulty due to frequent nocturia. He denies presyncope or syncope. He has had a few falls due to generalized weakness but did not hit his head nor lose consciousness. Recent labs reveal stable creatinine from 1.9 - 2.1. He is currently taking torsemide 60 mg bid. His wife has noticed an odor to his urine. He denies fever/chills but admits to feeling cold all the time. Recent Events: 
Feels well Epistaxis resolved, hematuria persists Platelets remain low IMPRESSION/PLAN: 
 Acute on chronic systolic heart failure 
 ICM, Stage D, NYHA Class IV, LVEF 16-20%, s/p CRT on 6/21/19 S/p Impella 5.0 implant 10/29, P8 
 CRT non-responder Intolerant to RAASi d/t renal dysfunction Off inotropes Hemodynamic parameters CI > 2, CVP < 14, MAP > 65 PRN bumex for CVP > 10 Hold Spironolactone due to IVVD Trend PBNP, CMP  Strict I/O 
 Daily Weights Follow renal function and electrolytes closely Eval in process: Needs Timing of colonoscopy, EGD early next week LHC - TBD, once UTI has cleared No dental needed- has dentures Continue pre- op education PFTs to be repeated with albuterol Shock Liver Improving after Impella placement LFTs trending down TBili trending down No Tolvaptan due to liver dysfunction History of VF arrest - s/p AICD No recent shocks Thrombocytopenia Platelets 80 today Monitor for now Likely multifactorial  
 
 CAD s/p CABG in 2010 Needs [facility]- timing TBD, prefer once infection has cleared PAF s/p DCCV 6/18/19 Hold eliquis Continue amiodarone, but decrease to 100 mg po daily Chronic Kidney Disease 3 - single kidney Nephrology following Renal US- no obstructive disease UTI with hematuria Positive culture for serratia marcescens Larger neal placed to allow drainage of clots Urology following Transfuse to keep Hgb > 8  
 
JOSE on CPAP Cont use home CPAP machine History of Sternal wound infection requiring sternectomy Stable CV surgery aware - not a contraindication for LVAD Tagged WBC negative ESR 30 Cortisol 22.4 Anemia, iron deficiency Iron sat 9% S/p Venofer Repeat iron profile shows low TIBC, elevated Ferritin Hematology recommendations appreciated Unable to use octreotide/doxy due to prolonged QTc - daily EKGs Fecal occult with next BM  
 
H/o DVT 
 AC on hold for hematuria Migraines Stable H/o tobacco abuse Counseling - continued abstinence PFTs Depression On lexapro Anorexia - likely multifactorial (depression, congestive hepatopathy) Prealbumin 16.2 Prealbumin weekly Nutritional supplements Begin Marinol Six small meals daily Mediastinal Lymphadenopathy D.w with Dr. Grover Jenkins- does not think this is lung CA, patient is too high risk for lung biopsy Repeat chest CT w/o contrast negative for malignancy Patient cannot have PET scan due to Impella requiring D5 in purge fluid Vocal Cord Paralysis Speech therapy recs appreciated- will postpone MBS for now Aspiration Precautions PPX Bowel regimen Impella abx prophylaxis Advance diet as tolerated D5 via impella CARDIAC EVALUATION 
ECHO (5/31/19) LVEF 21-25%, severely dilated LA, moderately dilated RA 
ECHO (6/24/19) LVEF 16-20%, Severely dilated LV, trace MR, trace TR 
 
EKG (6/12/19) atrial flutter with occasional PVCs, LBBB QRS 158ms EKG (6/26/19) Atrial fibrillation with premature ventricular or aberrantly conducted complexes, Left bundle branch block, rate 86, , QTc 564 [facility] not in epic Review of Systems:    
Review of Systems Constitutional: Negative for chills, fever and malaise/fatigue. Feels thirsty HENT: Negative for nosebleeds. Respiratory: Negative. Negative for shortness of breath. Cardiovascular: Negative for chest pain and leg swelling. Gastrointestinal: Negative for blood in stool, heartburn, nausea and vomiting. Genitourinary: Positive for hematuria. Neurological: Negative for dizziness, weakness and headaches. Endo/Heme/Allergies: Does not bruise/bleed easily. Impella (5.0) Performance Level (P Level): 8 Flow Rate L/min (0-2.5): 4 L/min Placement Signal (mmHg): Differential 5.0 (s/d 30-60/0 ex) Placement Signal (Systolic/Diastolic): 75/38 Motor Current (amp): (normal) Motor Current (Systolic/Diastolic): 292/792 Placement Monitoring: OK Purge Pressure (300-1100 mm Hg): 367 Purge Flow (ml/hr): 17.6 Sidearm Pressure Bag @ 300 mmHg/ flushed (Na with 5.0 Impella): No 
Power (AC/Battery): Yes Impella Pump Serial Number: [id number] Physical Exam:  
 
Visit Vitals BP 96/83 Pulse 77 Temp 98.9 °F (37.2 °C) Resp 23 Ht 6' 2\" (1.88 m) Wt 186 lb 4.6 oz (84.5 kg) SpO2 99% BMI 23.92 kg/m² Hemodynamics: CO: CO (l/min): 8.3 l/min CI: CI (l/min/m2): 4 l/min/m2 CVP: CVP (mmHg): 13 mmHg (11/05/19 1500) SVR: SVR (dyne*sec)/cm5: 781 (dyne*sec)/cm5 (11/03/19 1500) PAP Systolic: PAP Systolic: 45 (50/47/63 8816) PAP Diastolic: PAP Diastolic: 19 (60/40/25 5329) PVR:   
 SV02: SVO2 (%): 51 % (11/03/19 1500) SCV02: SCVO2 (%): 75 % (10/29/19 1900) Physical Exam  
Constitutional: He is oriented to person, place, and time and well-developed, well-nourished, and in no distress. He appears unhealthy. He appears cachectic. No distress. HENT:  
Head: Normocephalic. Eyes: Pupils are equal, round, and reactive to light. Neck: Normal range of motion. Neck supple. JVD present. Cardiovascular: Normal rate, regular rhythm, normal heart sounds and intact distal pulses. No murmur heard. Pulmonary/Chest: Effort normal and breath sounds normal. No respiratory distress. Abdominal: Soft. Bowel sounds are normal. He exhibits no distension. Musculoskeletal: Normal range of motion. He exhibits no edema. Neurological: He is alert and oriented to person, place, and time. Skin: Skin is warm and dry. Nursing note and vitals reviewed. History: 
Past Medical History:  
Diagnosis Date  Degenerative disc disease, lumbar  Heart failure ([address]  High cholesterol  Hypertension  Paroxysmal atrial fibrillation ([address] 4/2/2019  Spinal stenosis Past Surgical History:  
Procedure Laterality Date  HX APPENDECTOMY  HX CORONARY ARTERY BYPASS GRAFT    
 triple  HX HERNIA REPAIR    
 HX IMPLANTABLE CARDIOVERTER DEFIBRILLATOR  [place] CARDIOVERSION ELECTIVE ARRHYTHMIA EXTERNAL N/A 6/10/2019 EP CARDIOVERSION performed by Jennifer Fisher MD at [address] CATH LAB  [place] CARDIOVERSION ELECTIVE ARRHYTHMIA EXTERNAL N/A 6/18/2019 EP CARDIOVERSION performed by Ashley Fowler MD at [address] CATH LAB  TN INSJ/RPLCMT PERM DFB W/TRNSVNS LDS 1/DUAL CHMBR N/A 6/21/2019 INSERT ICD BIV MULTI performed by Meena Briggs MD at [address] CATH LAB  [place] TCAT IMPL WRLS P-ART PRS SNR L-T HEMODYN MNTR N/A 2019 IMPLANT HEART FAILURE MONITORING DEVICE performed by Alex Martin MD at [address]  CATH LAB Social History Socioeconomic History  Marital status:  Spouse name: Not on file  Number of children: Not on file  Years of education: Not on file  Highest education level: Not on file Occupational History  Not on file Social Needs  Financial resource strain: Not on file  Food insecurity:  
  Worry: Not on file Inability: Not on file  Transportation needs:  
  Medical: Not on file Non-medical: Not on file Tobacco Use  Smoking status: Former Smoker Last attempt to quit: 2010 Years since quittin.9  Smokeless tobacco: Never Used Substance and Sexual Activity  Alcohol use: Not Currently Comment: rarely  Drug use: Never  Sexual activity: Not on file Lifestyle  Physical activity:  
  Days per week: Not on file Minutes per session: Not on file  Stress: Not on file Relationships  Social connections:  
  Talks on phone: Not on file Gets together: Not on file Attends Zoroastrian service: Not on file Active member of club or organization: Not on file Attends meetings of clubs or organizations: Not on file Relationship status: Not on file  Intimate partner violence:  
  Fear of current or ex partner: Not on file Emotionally abused: Not on file Physically abused: Not on file Forced sexual activity: Not on file Other Topics Concern [address] Service Not Asked  Blood Transfusions Not Asked  Caffeine Concern Not Asked  Occupational Exposure Not Asked Marley Simpson Hazards Not Asked  Sleep Concern Not Asked  Stress Concern Not Asked  Weight Concern Not Asked  Special Diet Not Asked  Back Care Not Asked  Exercise Not Asked  Bike Helmet Not Asked  Seat Belt Not Asked  Self-Exams Not Asked Social History Narrative  Not on file Family History Problem Relation Age of Onset  Lung Disease Mother  Hypertension Mother [facility] Arthritis-osteo Mother  Heart Disease Mother  Heart Disease Father  Diabetes Father Current Medications:  
Current Facility-Administered Medications Medication Dose Route Frequency Provider Last Rate Last Dose  bisacodyl (DULCOLAX) suppository 10 mg  10 mg Rectal DAILY Eulene , NP   Stopped at 11/05/19 1100  
 amiodarone (CORDARONE) tablet 100 mg  100 mg Oral DAILY Stacey Herrera NP   100 mg at 11/05/19 1000  
 dronabinol (MARINOL) capsule 2.5 mg  2.5 mg Oral ACB&D Stacey Herrera NP      
 piperacillin-tazobactam (ZOSYN) 3.375 g in 0.9% sodium chloride (MBP/ADV) 100 mL  3.375 g IntraVENous Rosie Pino MD 25 mL/hr at 11/05/19 0951 3.375 g at 11/05/19 6172  insulin lispro (HUMALOG) injection   SubCUTAneous AC&HS Alok Shah MD   2 Units at 11/05/19 9978  bumetanide (BUMEX) injection 1 mg  1 mg IntraVENous Q6H PRN Tabatha Sharma MD   1 mg at 11/04/19 3085  fluticasone propionate (FLONASE) 50 mcg/actuation nasal spray 2 Spray  2 Spray Both Nostrils DAILY Tabatha Sharma MD   2 Spray at 11/04/19 0849  
 sodium chloride (OCEAN) 0.65 % nasal squeeze bottle 2 Spray  2 Spray Both Nostrils QID Tabatha Sharma MD   Stopped at 11/04/19 2200  
 alteplase (CATHFLO) 1 mg in dextrose 5% 50 mL impella purge solution  1 mg Other TITRATE Levonne MD Korin 0 mL/hr at 11/03/19 0937 1 mg at 11/03/19 2043  epoetin yvonne-epbx (RETACRIT) injection 10,000 Units  10,000 Units SubCUTAneous Q TUE, THU & SAT Agus Jimenez MD   10,000 Units at 11/02/19 2320  pantoprazole (PROTONIX) tablet 40 mg  40 mg Oral ACB Ruth DIOR NP   40 mg at 11/05/19 4539  dextrose 5% infusion  4-20 mL/hr IntraVENous CONTINUOUS Dina Joyner, Gaurav Trinidad, NP 17.1 mL/hr at 11/04/19 0421 17.1 mL/hr at 11/04/19 0421  bivalirudin (ANGIOMAX) 250 mg in dextrose 5% 250 mL infusion  4-20 mL/hr Other TITRATE Osvaldo Oropeza NP   Stopped at 11/01/19 2210  
 alteplase (CATHFLO) 1 mg in sterile water (preservative free) 1 mL injection  1 mg InterCATHeter PRN Osvaldo Oropeza NP   1 mg at 11/03/19 1344  bacitracin 500 unit/gram packet 1 Packet  1 Packet Topical PRN Osvaldo Oropeza NP      
 sodium chloride (NS) flush 5-40 mL  5-40 mL IntraVENous Q8H Sabra DIOR NP   10 mL at 11/04/19 2688  sodium chloride (NS) flush 5-40 mL  5-40 mL IntraVENous PRN Osvaldo Oropeza NP      
 0.45% sodium chloride infusion  10 mL/hr IntraVENous CONTINUOUS Sabra DIOR NP 10 mL/hr at 10/29/19 1321 10 mL/hr at 10/29/19 1321  
 0.9% sodium chloride infusion  10 mL/hr IntraVENous CONTINUOUS Shana Sims, NP 10 mL/hr at 10/31/19 1300 10 mL/hr at 10/31/19 1300  
 oxyCODONE IR (ROXICODONE) tablet 5 mg  5 mg Oral Q4H PRN Sabra DIOR NP   5 mg at 11/05/19 1412  
 oxyCODONE IR (ROXICODONE) tablet 10 mg  10 mg Oral Q4H PRN Osvaldo Oropeza NP   10 mg at 11/03/19 2159  morphine injection 4 mg  4 mg IntraVENous Q2H PRN Osvaldo Oropeza NP      
 naloxone [facility]) injection 0.4 mg  0.4 mg IntraVENous PRN Osvaldo Oropeza NP      
 ondansetron [facility]) injection 4 mg  4 mg IntraVENous Q4H PRN Sabra DIOR NP   4 mg at 10/31/19 0222  
 albuterol (PROVENTIL VENTOLIN) nebulizer solution 2.5 mg  2.5 mg Nebulization Q4H PRN Osvaldo Oropeza NP      
 chlorhexidine (PERIDEX) 0.12 % mouthwash 10 mL  10 mL Oral Q12H Sabra DIOR NP   10 mL at 11/05/19 1006  magnesium oxide (MAG-OX) tablet 400 mg  400 mg Oral BID Sarba DIOR NP   400 mg at 11/05/19 9489  calcium chloride 1 g in 0.9% sodium chloride 250 mL IVPB  1 g IntraVENous PRN Osvaldo Oropeza NP      
  bisacodyl (DULCOLAX) suppository 10 mg  10 mg Rectal DAILY PRN Velton Press, NP      
 senna-docusate (PERICOLACE) 8.6-50 mg per tablet 1 Tab  1 Tab Oral BID Velton Press, NP   1 Tab at 11/05/19 4717  polyethylene glycol (MIRALAX) packet 17 g  17 g Oral DAILY Norman DIOR NP   17 g at 11/05/19 3492  ELECTROLYTE REPLACEMENT NOTE: Nurse to review Serum Potassium and Magnesuim levels and Initiate Electrolyte Replacement Protocol as needed  1 Each Other PRN Velton Press, NP      
 magnesium sulfate 1 g/100 ml IVPB (premix or compounded)  1 g IntraVENous PRN Velton Press, NP      
 glucose chewable tablet 16 g  4 Tab Oral PRN Velton Press, NP      
 glucagon [facility]) injection 1 mg  1 mg IntraMUSCular PRN Velton Press, NP      
 dextrose 10% infusion 0-250 mL  0-250 mL IntraVENous PRN Velton Press, NP      
 morphine injection 2 mg  2 mg IntraVENous Q2H PRN Velton Press, NP      
 melatonin tablet 3 mg  3 mg Oral QHS PRN Velton Press, NP   3 mg at 10/30/19 2056  albumin human 5% (BUMINATE) solution 25 g  25 g IntraVENous Q2H PRN Stefano Gaytan MD   25 g at 10/30/19 9943  influenza vaccine 2019-20 (6 mos+)(PF) (FLUARIX/FLULAVAL/FLUZONE QUAD) injection 0.5 mL  0.5 mL IntraMUSCular PRIOR TO DISCHARGE Naty Viera, NP      
 [Held by provider] aspirin delayed-release tablet 81 mg  81 mg Oral DAILY Norman DIOR NP   81 mg at 10/31/19 2561  tamsulosin (FLOMAX) capsule 0.8 mg  0.8 mg Oral DAILY Norman DIOR NP   0.8 mg at 11/05/19 0853  allopurinol (ZYLOPRIM) tablet 100 mg  100 mg Oral DAILY Norman DIOR NP   100 mg at 11/05/19 8398 Allergies: No Known Allergies Recent Labs:  
Labs Latest Ref Rng & Units 11/5/2019 11/4/2019 11/3/2019 11/2/2019 11/2/2019 11/1/2019 10/31/2019 WBC 4.1 - 11.1 K/uL 5.3 5.5 4.9 - 4.6 4.3 - RBC 4.10 - 5.70 M/uL 2.83(L) 3.03(L) 2.92(L) - 3.16(L) 3.12(L) - Hemoglobin 12.1 - 17.0 g/dL 8. 3(L) 8.8(L) 8.4(L) 9.3(L) 9.1(L) 9.0(L) 9.4(L) Hematocrit 36.6 - 50.3 % 26. 4(L) 28. 1(L) 27. 1(L) 29. 7(L) 28. 7(L) 28. 8(L) 29. 5(L) MCV 80.0 - 99.0 FL 93.3 92.7 92.8 - 90.8 92.3 - Platelets 707 - 381 K/uL 80(L) 86(L) 81(L) - 91(L) 90(L) - Lymphocytes 12 - 49 % - - - - - - - Monocytes 5 - 13 % - - - - - - - Eosinophils 0 - 7 % - - - - - - - Basophils 0 - 1 % - - - - - - - Albumin 3.5 - 5.0 g/dL 2. 4(L) 2. 6(L) 2. 7(L) - 2. 6(L) 2. 7(L) -  
Calcium 8.5 - 10.1 MG/DL 8.5 8.7 8.4(L) - 8. 4(L) 8.6 -  
SGOT 15 - 37 U/L 50(H) 50(H) 55(H) - 46(H) 30 -  
Glucose 65 - 100 mg/dL 90 92 87 - 96 110(H) -  
BUN 6 - 20 MG/DL 25(H) 25(H) 28(H) - 32(H) 26(H) -  
Creatinine 0.70 - 1.30 MG/DL 1.07 1.19 1.16 - 1.22 1.35(H) - Sodium 136 - 145 mmol/L 128(L) 130(L) 132(L) - 131(L) 130(L) - Potassium 3.5 - 5.1 mmol/L 4.3 4.2 3.9 - 3.9 4.0 -  
TSH 0.36 - 3.74 uIU/mL - - - - - - -  
PSA 0.01 - 4.0 ng/mL - - - - - - -  
LDH 85 - 241 U/L 482(H) 458(H) 435(H) - 446(H) 350(H) - Some recent data might be hidden Lab Results Component Value Date/Time WBC 5.3 11/05/2019 04:12 AM  
 HGB 8.3 (L) 11/05/2019 04:12 AM  
 HCT 26.4 (L) 11/05/2019 04:12 AM  
 PLATELET 80 (L) 09/40/3392 04:12 AM  
 MCV 93.3 11/05/2019 04:12 AM  
 
Lab Results Component Value Date/Time GFR est non-AA >60 11/05/2019 04:12 AM  
 GFR est AA >60 11/05/2019 04:12 AM  
 Creatinine 1.07 11/05/2019 04:12 AM  
 BUN 25 (H) 11/05/2019 04:12 AM  
 Sodium 128 (L) 11/05/2019 04:12 AM  
 Potassium 4.3 11/05/2019 04:12 AM  
 Chloride 93 (L) 11/05/2019 04:12 AM  
 CO2 33 (H) 11/05/2019 04:12 AM  
 Magnesium 2.4 11/05/2019 04:12 AM  
 Phosphorus 2.4 (L) 06/04/2019 04:09 AM  
 
Lab Results Component Value Date/Time TSH 2.40 10/25/2019 07:39 PM  
 T4, Free 1.7 (H) 10/25/2019 07:39 PM  
  
Lab Results Component Value Date/Time  Sodium 128 (L) 11/05/2019 04:12 AM  
 Potassium 4.3 11/05/2019 04:12 AM  
 Chloride 93 (L) 11/05/2019 04:12 AM  
 CO2 33 (H) 11/05/2019 04:12 AM  
 Anion gap 2 (L) 11/05/2019 04:12 AM  
 Glucose 90 11/05/2019 04:12 AM  
 BUN 25 (H) 11/05/2019 04:12 AM  
 Creatinine 1.07 11/05/2019 04:12 AM  
 BUN/Creatinine ratio 23 (H) 11/05/2019 04:12 AM  
 GFR est AA >60 11/05/2019 04:12 AM  
 GFR est non-AA >60 11/05/2019 04:12 AM  
 Calcium 8.5 11/05/2019 04:12 AM  
 Bilirubin, total 1.8 (H) 11/05/2019 04:12 AM  
 ALT (SGPT) 28 11/05/2019 04:12 AM  
 AST (SGOT) 50 (H) 11/05/2019 04:12 AM  
 Alk. phosphatase 143 (H) 11/05/2019 04:12 AM  
 Protein, total 5.5 (L) 11/05/2019 04:12 AM  
 Albumin 2.4 (L) 11/05/2019 04:12 AM  
 Globulin 3.1 11/05/2019 04:12 AM  
 A-G Ratio 0.8 (L) 11/05/2019 04:12 AM  
  
Lab Results Component Value Date/Time Hemoglobin A1c 6.6 (H) 10/25/2019 02:56 PM  
  
 
 
Thank you for letting us see him with you, TIERRA Benson [address] [place] [address], [address] Office [phone] Fax [phone] [facility] ATTENDING ADDENDUM Patient was seen and examined in person. Data and notes were reviewed. I have discussed and agree with the plan as noted in the NP note above without further additions. Maribel Phillips MD PhD 
[facility] [address] [place] [address] Total Critical Care time spent: 2153-2482 
30 minutes. There was no overlap with other services Critical care was necessary to treat or prevent imminent or life threatening deterioration of the following conditions: cardiac failure, respiratory failure and CNS failure or compromise 
  
Services Provided: 1. Telemetry review and 12 lead ECG interpretation 2. Hemodynamic interpretation, assessment, and management 3. Review and interpretation of CXR 4. Review and interpretation of lab values 5. Review and interpretation of microbiologic data and culture results 6. Review of medications and administration 7. Review and interpretation of nutrition requirements and management 8. Discussion of management withother consultants and services 9.  Clinical update to family members

## 2019-11-05 NOTE — PROGRESS NOTES
0800:  Report received from [facility]. Pt resting, up in chair. 0900:  Pt had large BM in toilet. Unable to obtain occult stool but stool has no visible blood and is formed. Will obtain occult next time. 1100:  Pt O2 removed. Pt saturations 98% on room air at this time. Wife at bedside. 1400:  RT notified that patient needs bedside PFT's with albuterol. They report that it will be done first thing in the morning. Lizama irrigated 1530:  Pt had another large bowel movement. Occult stool is positive. Results recorded in chart. Pt is concerned about having 2 stools today and does not want to take stool softeners tomorrow. 1900: Lizama irrigated 
 
2000:  Bedside shift change report given to [facility] (oncoming nurse) by Fadi Lott RN (offgoing nurse). Report included the following information SBAR, Kardex, MAR and Recent Results.

## 2019-11-05 NOTE — PROGRESS NOTES
ID Progress Note 
2019 Subjective: No specific complaints today--seems to be doing ok Objective: Antibiotics: 1. Cefepime Vitals:  
Visit Vitals BP 96/83 Pulse 77 Temp 98.9 °F (37.2 °C) Resp 23 Ht 6' 2\" (1.88 m) Wt 84.5 kg (186 lb 4.6 oz) SpO2 99% BMI 23.92 kg/m² Tmax:  Temp (24hrs), Av.8 °F (37.1 °C), Min:98.7 °F (37.1 °C), Max:98.9 °F (37.2 °C) Exam:  Lungs:  clear to auscultation bilaterally Heart:  regular rate and rhythm Abdomen:  soft, non-tender. Bowel sounds normal. No masses,  no organomegaly Grossly bloody urine in catheter Labs:     
Recent Labs 19 
5357 19 
0411 19 
3984 WBC 5.3 5.5 4.9 HGB 8.3* 8.8* 8.4* PLT 80* 86* 81*  
BUN 25* 25* 28* CREA 1.07 1.19 1.16  
SGOT 50* 50* 55* * 146* 131* TBILI 1.8* 1.9* 2.3* Cultures: No results found for: SDES Lab Results Component Value Date/Time Culture result: SERRATIA MARCESCENS (A) 10/31/2019 10:05 AM  
 Culture result: SERRATIA MARCESCENS (2ND COLONY TYPE/STRAIN) (A) 10/31/2019 10:05 AM  
 Culture result: ENTEROCOCCUS FAECALIS GROUP D (A) 10/31/2019 10:05 AM  
 
 
Radiology:  
 
Line/Insert Date:        
 
Assessment:  
 
1. UTI--Serratia (2 biotypes) from culture and small amount of Enterococcus 2. CHF/cardiomyopathy Objective: 1. Continue current therapy Sophy Alvarado MD

## 2019-11-06 NOTE — PROGRESS NOTES
[address] [facility] [place] [address] [facility] [place] in [facility] Inpatient Progress Note Patient name: Matt Bailey Patient : 1950 Patient MRN: [id number] Attending MD: Lamin eDl Valle MD 
Date of service: 19 CHIEF COMPLAINT: 
Cardiogenic shock 
  
PLAN: 
Continue current impella speed at P8; cannot tolerate lower speeds; cardiac index at goal 
Repeat TTE today Continue bumex 1mg IV every 6 hours for CVP > 10; renal consult appreciated CAROL hose to mobilize LE fluid Transduce CVP off PICC Needs manual MAPs once daily in AM (automated cuffs consistently erroneous) No ACE/ARB/ARNi in anticipation of surgery No MAR due to hyponatremia No tolvaptan due to hepatic dysfuction Continue small dose amiodarone for PAF Anemia and thrombocytopenia likely due to hematuria and hemolysis Urinary retention and Serratia UTI with hematuria, on antibiotics by ID Urology consult appreciated Continuous bladder irrigation Anticoagulation and ASA on hold due to hematuria and epistaxis Cannot use octeotride or doxycycline for epistaxis/AVM prophylaxis due to QTc > 580ms Nutritionist consult; okay to bring food from home; working on nutrition, prealbumins weekly, began Marinol and ordered 6 small meals daily Pulmonary to review repeat chest CT showing again mild lymphadenopathy, no mass/nodules; has emphysema and will need 6MW to r/o exercise induced hypoxia; won't be able to get DLCO on impella PFTs unchanged post bronchodilator therapy Awaiting remainder of the VAD workup once he recuperates from Impella implant next week, colonoscopy, EGD likely at the end of the week Delay C until UTI clears and GI evaluation completed to r/o CA Not be able to have PET scan due to dextrose required for Impella infusion Patient and family understand it will be at least one month that he will be ready for another surgery Ongoing PT/OT/VAD education  
  
IMPRESSION: 
 Cardiogenic shock S/p Impella 5.0 implant 10/29/19 by Dr. Catrina Lee Acute on chronic systolic heart failure Stage D, NYHA class IV symptoms Non-ischemic cardiomyopathy, LVEF 15%, LVEDD 5.7cm Hyponatremia Liver dysfunction Thrombocytopenia H/o VF arrest s/p ICD 
CAD s/p CABG 2010 C/b sternal wound infection requiring sternectomy CKD PAF s/p DCCV 61/8/19 UTI with GNR Anemia, iron deficiency Cardiac cachexia Vocal cord paralysis Epistaxis Hematuria 
  
CARDIAC IMAGING: 
Tagged WBC negative 
  INTERVAL HISTORY: 
Feels well today O2 weaned off 
PO2 acceptable on room air MAPS at goal 
CVP around 6 I/O net negative 1229 cc, urine 2.7liters HGB 8.0 from 8.3 PLT 84 from 91 
UA with serratia PHYSICAL EXAM: 
Visit Vitals BP 90/69 Pulse 83 Temp 98.9 °F (37.2 °C) Resp 15 Ht 6' 2\" (1.88 m) Wt 183 lb 3.2 oz (83.1 kg) SpO2 93% BMI 23.52 kg/m² Impella (5.0) Performance Level (P Level): 8 Flow Rate L/min (0-2.5): 4.1 L/min Placement Signal (mmHg): Differential 5.0 (s/d 30-60/0 ex) Placement Signal (Systolic/Diastolic): 62/4 Motor Current (amp): (normal) Motor Current (Systolic/Diastolic): 234/655 Placement Monitoring: OK Purge Pressure (300-1100 mm Hg): 362 Purge Flow (ml/hr): 16.8 Sidearm Pressure Bag @ 300 mmHg/ flushed (Na with 5.0 Impella): No 
Power (AC/Battery): Yes Impella Pump Serial Number: [id number] Hemodynamics: 
 CVP: CVP (mmHg): 8 mmHg (11/06/19 1500) General: Patient is well developed, cachectic, well-nourished in no acute distress HEENT: Normocephalic and atraumatic. No scleral icterus. Pupils are equal, round and reactive to light and accomodation. No conjunctival injection. Oropharynx is clear. Neck: Supple. No evidence of thyroid enlargements or lymphadenopathy. JVD: Cannot be appreciated Lungs: Breath sounds are equal and clear bilaterally. No wheezes, rhonchi, or rales. Heart: Regular rate and rhythm with normal S1 and S2.  No murmurs, gallops or rubs. Abdomen: Soft, no mass or tenderness. No organomegaly or hernia. Bowel sounds present. Genitourinary and rectal: neal in place, gross hematuria noted. Extremities: No cyanosis, clubbing. 3+ LE pitting edema. Neurologic: No focal sensory or motor deficits are noted. Grossly intact. Psychiatric: Awake, alert and oriented x 3. Appropriate mood and affect. Skin: Warm, dry and well perfused. Multiple areas of ecchymosis on his arms. REVIEW OF SYSTEMS: 
General: Denies fever, night sweats. Ear, nose and throat: Denies difficulty hearing, sinus problems, runny nose, post-nasal drip, ringing in ears, mouth sores, loose teeth, ear pain, nosebleeds, sore throate, facial pain or numbess Cardiovascular: see above in the interval history Respiratory: Denies cough, wheezing, sputum production, hemoptysis. Gastrointestinal: Denies heartburn, constipation, intolerance to certain foods, diarrhea, abdominal pain, nausea, vomiting, difficulty swallowing, blood in stool Kidney and bladder: Denies painful urination, frequent urination, urgency, prostate problems and impotence Musculoskeletal: Denies joint pain, muscle weakness Skin and hair: Denies change in existing skin lesions, hair loss or increase PAST MEDICAL HISTORY: 
Past Medical History:  
Diagnosis Date  Degenerative disc disease, lumbar  Heart failure ([address]  High cholesterol  Hypertension  Paroxysmal atrial fibrillation ([address] 4/2/2019  Spinal stenosis PAST SURGICAL HISTORY: 
Past Surgical History:  
Procedure Laterality Date  HX APPENDECTOMY  HX CORONARY ARTERY BYPASS GRAFT    
 triple  HX HERNIA REPAIR    
 HX IMPLANTABLE CARDIOVERTER DEFIBRILLATOR  AR CARDIOVERSION ELECTIVE ARRHYTHMIA EXTERNAL N/A 6/10/2019 EP CARDIOVERSION performed by Aury David MD at [address] [address], [facility] [address] CATH LAB  AR CARDIOVERSION ELECTIVE ARRHYTHMIA EXTERNAL N/A 6/18/2019 EP CARDIOVERSION performed by Georgeanna Cooks, MD at [address], [facility] [address] CATH LAB  VT INSJ/RPLCMT PERM DFB W/TRNSVNS LDS 1/DUAL CHMBR N/A 2019 INSERT ICD BIV MULTI performed by Carl Chandler MD at [address], [facility] [address] CATH LAB  VT TCAT IMPL WRLS P-ART PRS SNR L-T HEMODYN MNTR N/A 2019 IMPLANT HEART FAILURE MONITORING DEVICE performed by Clara Jackson MD at [address], [facility] [address] CATH LAB FAMILY HISTORY: 
Family History Problem Relation Age of Onset  Lung Disease Mother  Hypertension Mother [place] Arthritis-osteo Mother  Heart Disease Mother  Heart Disease Father  Diabetes Father SOCIAL HISTORY: 
Social History Socioeconomic History  Marital status:  Spouse name: Not on file  Number of children: Not on file  Years of education: Not on file  Highest education level: Not on file Tobacco Use  Smoking status: Former Smoker Last attempt to quit: 2010 Years since quittin.9  Smokeless tobacco: Never Used Substance and Sexual Activity  Alcohol use: Not Currently Comment: rarely  Drug use: Never Other Topics Concern LABORATORY RESULTS: 
  
Labs Latest Ref Rng & Units 2019 2019 2019 11/3/2019 2019 2019 2019 WBC 4.1 - 11.1 K/uL 4.9 5.3 5.5 4.9 - 4.6 4.3  
RBC 4.10 - 5.70 M/uL 2.76(L) 2.83(L) 3.03(L) 2.92(L) - 3.16(L) 3.12(L) Hemoglobin 12.1 - 17.0 g/dL 8. 0(L) 8. 3(L) 8.8(L) 8.4(L) 9.3(L) 9.1(L) 9.0(L) Hematocrit 36.6 - 50.3 % 26. 0(L) 26. 4(L) 28. 1(L) 27. 1(L) 29. 7(L) 28. 7(L) 28. 8(L) MCV 80.0 - 99.0 FL 94.2 93.3 92.7 92.8 - 90.8 92.3 Platelets 385 - 235 K/uL 84(L) 80(L) 86(L) 81(L) - 91(L) 90(L) Lymphocytes 12 - 49 % - - - - - - - Monocytes 5 - 13 % - - - - - - - Eosinophils 0 - 7 % - - - - - - - Basophils 0 - 1 % - - - - - - - Albumin 3.5 - 5.0 g/dL 2. 3(L) 2. 4(L) 2. 6(L) 2. 7(L) - 2. 6(L) 2. 7(L) Calcium 8.5 - 10.1 MG/DL 8.5 8.5 8.7 8.4(L) - 8. 4(L) 8.6 SGOT 15 - 37 U/L 42(H) 50(H) 50(H) 55(H) - 46(H) 30 Glucose 65 - 100 mg/dL 83 90 92 87 - 96 110(H) BUN 6 - 20 MG/DL 18 25(H) 25(H) 28(H) - 32(H) 26(H) Creatinine 0.70 - 1.30 MG/DL 1.03 1.07 1.19 1.16 - 1.22 1.35(H) Sodium 136 - 145 mmol/L 132(L) 128(L) 130(L) 132(L) - 131(L) 130(L) Potassium 3.5 - 5.1 mmol/L 3.9 4.3 4.2 3.9 - 3.9 4.0 TSH 0.36 - 3.74 uIU/mL - - - - - - -  
PSA 0.01 - 4.0 ng/mL - - - - - - -  
LDH 85 - 241 U/L 436(H) 482(H) 458(H) 435(H) - 446(H) 350(H) Some recent data might be hidden Lab Results Component Value Date/Time TSH 2.40 10/25/2019 07:39 PM  
 TSH 2.45 06/01/2019 04:16 AM  
 
 
ALLERGY: 
No Known Allergies CURRENT MEDICATIONS: 
 
Current Facility-Administered Medications:  
  amiodarone (CORDARONE) tablet 100 mg, 100 mg, Oral, DAILY, Polliard, Hamp Pallas T, NP, 100 mg at 11/06/19 4498   dronabinol (MARINOL) capsule 2.5 mg, 2.5 mg, Oral, ACB&D, Jose Herrera Pallas T, NP, 2.5 mg at 11/06/19 1435   piperacillin-tazobactam (ZOSYN) 3.375 g in 0.9% sodium chloride (MBP/ADV) 100 mL, 3.375 g, IntraVENous, Q8H, Zenon Arroyo MD, Last Rate: 25 mL/hr at 11/06/19 0854, 3.375 g at 11/06/19 7998   insulin lispro (HUMALOG) injection, , SubCUTAneous, AC&HS, Soledad Sosa MD, 2 Units at 11/06/19 1242   bumetanide (BUMEX) injection 1 mg, 1 mg, IntraVENous, Q6H PRN, Bita Henry MD, 1 mg at 11/04/19 8356   fluticasone propionate (FLONASE) 50 mcg/actuation nasal spray 2 Spray, 2 Spray, Both Nostrils, DAILY, Bita Henry MD, 2 [place] at 11/04/19 0849 
  sodium chloride (OCEAN) 0.65 % nasal squeeze bottle 2 Spray, 2 Spray, Both Nostrils, QID, Bita Henry MD, 2 Spray at 11/05/19 2107   alteplase (CATHFLO) 1 mg in dextrose 5% 50 mL impella purge solution, 1 mg, Other, TITRATE, Isa Taveras MD, Last Rate: 0 mL/hr at 11/03/19 0937, 1 mg at 11/03/19 2043   epoetin yvonne-epbx (RETACRIT) injection 10,000 Units, 10,000 Units, SubCUTAneous, Q TUE, THU & SAT, Kenia Bailey MD, 10,000 Units at 11/05/19 2107   pantoprazole (PROTONIX) tablet 40 mg, 40 mg, Oral, ACB, Nancy Ped D, NP, 40 mg at 11/06/19 6044   dextrose 5% infusion, 4-20 mL/hr, IntraVENous, CONTINUOUS, Nancy Ped D, NP, Last Rate: 17.1 mL/hr at 11/04/19 0421, 17.1 mL/hr at 11/04/19 0421   bivalirudin (ANGIOMAX) 250 mg in dextrose 5% 250 mL infusion, 4-20 mL/hr, Other, TITRATE, Sumit Bernstein, NP, Stopped at 11/01/19 2210 
  alteplase (CATHFLO) 1 mg in sterile water (preservative free) 1 mL injection, 1 mg, InterCATHeter, PRN, Nancy Ped D, NP, 1 mg at 11/03/19 1344   bacitracin 500 unit/gram packet 1 Packet, 1 Packet, Topical, PRN, Sumit Bernstein, NP 
  sodium chloride (NS) flush 5-40 mL, 5-40 mL, IntraVENous, Q8H, Nancy Ped D, NP, 10 mL at 11/06/19 9110 
  sodium chloride (NS) flush 5-40 mL, 5-40 mL, IntraVENous, PRN, Sumit Bernstein, NP 
  0.45% sodium chloride infusion, 10 mL/hr, IntraVENous, CONTINUOUS, Nancy Ped D, NP, Last Rate: 10 mL/hr at 10/29/19 1321, 10 mL/hr at 10/29/19 1321 
  0.9% sodium chloride infusion, 10 mL/hr, IntraVENous, CONTINUOUS, Levi Shana B, NP, Last Rate: 10 mL/hr at 10/31/19 1300, 10 mL/hr at 10/31/19 1300 
  oxyCODONE IR (ROXICODONE) tablet 5 mg, 5 mg, Oral, Q4H PRN, Nancy Ped D, NP, 5 mg at 11/06/19 0929 
  oxyCODONE IR (ROXICODONE) tablet 10 mg, 10 mg, Oral, Q4H PRN, Lennice Munirings, NP, 10 mg at 11/03/19 2159   morphine injection 4 mg, 4 mg, IntraVENous, Q2H PRN, Sumit Bernstein NP 
  naloxone [facility]) injection 0.4 mg, 0.4 mg, IntraVENous, PRN, Sumit Bernstein NP 
  ondansetron [facility]) injection 4 mg, 4 mg, IntraVENous, Q4H PRN, Nancy Ped D, NP, 4 mg at 10/31/19 4370 
  albuterol (PROVENTIL VENTOLIN) nebulizer solution 2.5 mg, 2.5 mg, Nebulization, Q4H PRN, Nancy Ped D, NP, 2.5 mg at 11/06/19 2409   chlorhexidine (PERIDEX) 0.12 % mouthwash 10 mL, 10 mL, Oral, Q12H, Jamir Solum D, NP, 10 mL at 11/06/19 0900 
  magnesium oxide (MAG-OX) tablet 400 mg, 400 mg, Oral, BID, Jamir Solum D, NP, 400 mg at 11/06/19 8434   calcium chloride 1 g in 0.9% sodium chloride 250 mL IVPB, 1 g, IntraVENous, PRN, Fer Payan, NP 
  bisacodyl (DULCOLAX) suppository 10 mg, 10 mg, Rectal, DAILY PRN, Fer Payan, NP 
  senna-docusate (PERICOLACE) 8.6-50 mg per tablet 1 Tab, 1 Tab, Oral, BID, Fer Payan NP, 1 Tab at 11/06/19 7877   polyethylene glycol (MIRALAX) packet 17 g, 17 g, Oral, DAILY, Jamir Solum D, NP, 17 g at 11/06/19 0900   ELECTROLYTE REPLACEMENT NOTE: Nurse to review Serum Potassium and Magnesuim levels and Initiate Electrolyte Replacement Protocol as needed, 1 Each, Other, PRN, Fer Payan, NP 
  magnesium sulfate 1 g/100 ml IVPB (premix or compounded), 1 g, IntraVENous, PRN, Fer Payan, NP 
  glucose chewable tablet 16 g, 4 Tab, Oral, PRN, Champa Ora, NP 
  glucagon [facility]) injection 1 mg, 1 mg, IntraMUSCular, PRN, Champa Ora, NP 
  dextrose 10% infusion 0-250 mL, 0-250 mL, IntraVENous, PRN, Champa Ora, NP 
  morphine injection 2 mg, 2 mg, IntraVENous, Q2H PRN, Fer Payan, NP 
  melatonin tablet 3 mg, 3 mg, Oral, QHS PRN, Larradhaa Ora, NP, 3 mg at 10/30/19 2056   albumin human 5% (BUMINATE) solution 25 g, 25 g, IntraVENous, Q2H PRN, Alecia Cardozo MD, 25 g at 10/30/19 7026   influenza vaccine 2019-20 (6 mos+)(PF) (FLUARIX/FLULAVAL/FLUZONE QUAD) injection 0.5 mL, 0.5 mL, IntraMUSCular, PRIOR TO DISCHARGE, Fer Payan NP 
  [Held by provider] aspirin delayed-release tablet 81 mg, 81 mg, Oral, DAILY, Jamir DIOR NP, 81 mg at 10/31/19 1580   tamsulosin (FLOMAX) capsule 0.8 mg, 0.8 mg, Oral, DAILY, Jamir DIOR NP, 0.8 mg at 11/06/19 5574   allopurinol (ZYLOPRIM) tablet 100 mg, 100 mg, Oral, DAILY, Norman Ambrosio DIOR, NP, 100 mg at 11/06/19 9434 Thank you for allowing me to participate in this patient's care. Nagi Butts AGACNP-BC [place] [address], [address] Phone: [phone] Fax: [phone] [facility] ATTENDING ADDENDUM Patient was seen and examined in person. Data and notes were reviewed. I have discussed and agree with the plan as noted in the NP note above without further additions. Afsaneh Rossi MD PhD 
[place] [address] [facility] Total Critical Care time spent: 4212-1410 
30 minutes. There was no overlap with other services Critical care was necessary to treat or prevent imminent or life threatening deterioration of the following conditions: cardiac failure, respiratory failure and CNS failure or compromise 
  
Services Provided: 1. Telemetry review and 12 lead ECG interpretation 2. Hemodynamic interpretation, assessment, and management 3. Review and interpretation of CXR 4. Review and interpretation of lab values 5. Review and interpretation of microbiologic data and culture results 6. Review of medications and administration 7. Review and interpretation of nutrition requirements and management 8. Discussion of management withother consultants and services 9.  Clinical update to family members

## 2019-11-06 NOTE — PROGRESS NOTES
[facility] ICU Progress Note Admit Date: 10/25/2019 POD:  6 Day Post-Op Procedure:  Procedure(s): RIGHT AXILLARY IMPELLA INSERTION Subjective:  
Pt seen with Dr. Fany Heart. On room air, sitting in the chair. Afebrile. Impella P8, D5 purge. Lizama in place-plan to start CBI per Urology. Objective:  
Vitals: 
Blood pressure (!) 88/69, pulse 77, temperature 98.9 °F (37.2 °C), resp. rate 20, height 6' 2\" (1.88 m), weight 183 lb 3.2 oz (83.1 kg), SpO2 90 %. Temp (24hrs), Av.7 °F (37.1 °C), Min:98.1 °F (36.7 °C), Max:98.9 °F (37.2 °C) Hemodynamics: 
 CO: CO (l/min): 8.3 l/min CI: CI (l/min/m2): 4 l/min/m2 CVP: CVP (mmHg): 5 mmHg (19 09) SVR: SVR (dyne*sec)/cm5: 781 (dyne*sec)/cm5 (19 1500) PAP Systolic: PAP Systolic: 45 ( 5721) PAP Diastolic: PAP Diastolic: 19 ( 4624) PVR:   
 SV02: SVO2 (%): 51 % (19 1500) SCV02: SCVO2 (%): 75 % (10/29/19 1900) EKG/Rhythm:   
Paced in the 76s Oxygen Therapy: 
Oxygen Therapy O2 Sat (%): 90 % (19 0900) Pulse via Oximetry: 83 beats per minute (19 1500) O2 Device: Room air (19 0800) O2 Flow Rate (L/min): 2 l/min (19 0000) FIO2 (%): 40 % (10/30/19 0846) CXR:  
CXR Results  (Last 48 hours) 19 0516  XR CHEST PORT Final result Impression:  IMPRESSION: No significant change. Narrative:  INDICATION:  postop heart IMPELLA. Heart failure. EXAM: CXR Portable. FINDINGS: Portable chest shows support lines/devices show no significant change  
since yesterday. There is no apparent pneumothorax. Lungs show emphysema  
without consolidation. Heart size is top normal. There is prior CABG. There is  
no overt pulmonary edema. 19 0515  XR CHEST PORT Final result Impression:  IMPRESSION:  
1. Persistent unchanged mild pulmonary edema Narrative:  EXAM: XR CHEST PORT INDICATION: postop heart, cardiac assist device COMPARISON: 2019 FINDINGS: A portable AP radiograph of the chest was obtained at 0419 hours. The  
patient is on a cardiac monitor. The cardiac assist device and right-sided PICC  
line and pacemaker are in satisfactory position. There is no change in mild  
cardiomegaly. There is no change in mild pulmonary edema. No pneumothorax. Admission Weight: Last Weight Weight: 192 lb 10.9 oz (87.4 kg) Weight: 183 lb 3.2 oz (83.1 kg) Intake / Output / Drain: 
Current Shift:  07 - 1900 In: 370.2 [P.O.:236; I.V.:134.2] Out: 225 [Urine:225] Last 24 hrs.:  
 
Intake/Output Summary (Last 24 hours) at 2019 1100 Last data filed at 2019 1684 Gross per 24 hour Intake 1586.3 ml Output 2670 ml Net -1083.7 ml EXAM: 
General:   NAD. Up in the chair Lungs:   Diminished in the bases. Incision:  Impella dressing D/I with some old drainage in place Heart:  Regular rate and rhythm, S1, S2 normal, no murmur, click, rub or gallop. Abdomen:   Soft, non-tender. Bowel sounds hypoactive. No masses,  No organomegaly. Lizama with gross hematuria Extremities:  +2 bilateral lower extremity pitting edema. PPP. Neurologic:  Gross motor and sensory apparatus intact. Labs:  
Recent Labs 19 
1783 19 
5125 WBC  --  4.9 HGB  --  8.0* HCT  --  26.0*  
PLT  --  84* NA  --  132* K  --  3.9 BUN  --  18  
CREA  --  1.03  
GLU  --  83 GLUCPOC 98  --   
INR  --  1.1 Assessment:  
 
Active Problems: 
  Acute decompensated heart failure ([facility] [address] (10/25/2019) Plan/Recommendations/Medical Decision Makin. Acute on chronic systolic (CHF Class IV) S/P Impella: Has ICD. LV EF 16-20%. No BB/ACE/ARB/AA until appropriate. Bumex 1 mg IV QID-PRN. Trend proBNP, lactate, LDH. Strict I/Os. Daily weights.  LVAD w/u in process. Cefepime changed to zosyn per ID for prophylaxis. Continue D5 purge due to hematuria/epistaxis 2. Thrombocytopenia: Platelets 09Z. No asa. HIT negative, LOAN pending. On protonix. Heme following. 3. CAD S/p CABG 2010: Needs LHC, timing TBD, once UTI has cleared. Stop asa d/t hematuria/thrombocytopenia, not on statin historically. No BB D/t pressors/intropes. 4. PAF s/p DCCV 6/2019: Holding eliquis due to hematuria/epistaxis. On PO amio. 5. JUAN J on CKD stage IV: Monitor. Renal following. Diuretics PRN CVP >10. Keep neal. 6. Hx of urinary retention/BPH, hematuria:  On flomax. Neal will be transitioned to CBI per Urology. Urology following. 7. JOSE: qhs CPAP. 8. Hx of sternal wound infection requring sternectomy: Not a contraindication for future LVAD. Tagged WBC -- showed no abnormal tracer uptake. 9. Iron def anemia: s/p venofer. H&H stable today. On Epogen. Cannot use doxy/octreotide d/t prolonged QTC. Occult blood in stool positive per POC testing. Gastroenterology following for LVAD work up. 10. Vocal cord paralysis: Voice improving. Speech eval PRN. Advance diet as tolerated. 11. Constipation: Resolved with BM X 2 11/5. Continue pericolace, miralax(pt keeps refusing). Add daily suppository PRN. 12. Serratia UTI/hematuria: Urology following and will switch to CBI today. On cefepime-ID following. 13. Mediastinal lymphadenopathy:  Per AHF, low threshold for Carcinoma per CT scans. Eventually needs PET scan. 14. Hyponatremia: monitor 15. Acute Moderate protein-calorie malnutrition:  Pre-albumin 10/25 was 13.9. Recheck 11/4 was 16.2. Pt eating cardiac diet well. Continue marinol Dispo: Care and orders per AHFS. Remain in CCU.  
 
Signed By: Ramila Davis NP

## 2019-11-06 NOTE — PROGRESS NOTES
NUTRITION COMPLETE ASSESSMENT 
 
RECOMMENDATIONS:  
1. Daily weights. 2. Document PO intake. 3. Encourage consumption of ONS. Interventions/Plan:  
Food/Nutrient Delivery: Addition of Magic Cup 1x/day (290 kcal, 9 g pro) and Boost Pudding 1x/day (240 kcal, 7 g pro) Ensure Enlive BID (700 kcal, 40 g pro) Assessment:  
Reason for Assessment:  
[x] Reassessment Diet: Cardiac(no conc sweets, six small meals) Supplements: Glucerna BID (440 kcal, 20 g pro) Nutritionally Significant Medications: [x] Reviewed & Includes: correction scale insulin, magnesium oxide, Miralax, Pericolace, Aldactone, KCL, zyloprim, reatcrit, marinol, protonix, PRN;dulcolax Meal Intake:  
Patient Vitals for the past 100 hrs: 
 % Diet Eaten 11/06/19 0924 60 % 11/05/19 1258 60 % Subjective: This is the first time in months I have eaten all my breakfast! I do not like the smell of the chicken and the fish was not good. Objective: 
Mr Papa Patricia was admitted with acute decompensated heart failure. PMHx: HTN, CKD, CAD chronic systolic heart failure, depression, sternal wound. Noted: acute on chronic systolic heart failure, ICM, Impella placed 10/29, undergoing LVAD work-up; mediastinal lymphadenopathy; CKD IV and hyponatremia (volume overload); needs LHC, timing TBD Pt meets the requirements for malnutrition. Started on Marinol 11/5 due to decreased appetite. Current diet for pt is cardiac(no conc sweets, six small meals). Pt's PO intake is improving. RD saw about 45% of his lunch tray consumed, however pt stated he ate 100% of his breakfast. Pt stated that his PO intake is much better than when he first arrived this admission, however is experiencing early satiety. RD to add Magic Cup 1x/day and Boost Pudding 1x/day in between meals to ensure optimal energy and protein intake.  With the addition of a snack in between meals, RD to take off six small meals a day. To promote meeting energy and protein needs, RD to change Glucerna 2x/day (440 kcal, 20 g pro)and switch to Ensure Enlive 2x/day (700 kcal, 40 g pro) due to stable BG. Estimated Nutrition Needs:  
Kcals/day: 2000 Kcals/day(1260-0166 (MSJ x 1.1-1.2) Protein: 100 g(1.2g/kg) Fluid: (1 ml/kcal) Based On: Nicolasa Garcia Weight Used: Actual wt(83 kg) Pt expected to meet estimated nutrient needs:  [x]   Yes     []  No  [] Unable to predict at this time Nutrition Diagnosis:  
1. Unintended weight loss related to CHF vs depression vs malignancy as evidenced by >10% weight loss x 6 months. 2. Inadequate oral intake - improving related to poor PO intake as evidenced by >40% consumption of meals. Goals:   
 Consume more than >50% of all meals and 2 ONS within the next 5-7 days Monitoring & Evaluation: - Total energy intake, Protein intake - Weight/weight change, CV-pulmonary Previous Nutrition Goals Met: 
Yes Previous Recommendations:     
Yes 
 
Education & Discharge Needs: 
 [x] None Identified 
 [] Identified and addressed [x] Participated in care plan, discharge planning, and/or interdisciplinary rounds Cultural, Jain and ethnic food preferences identified: 
 None Skin Integrity: []Intact  [x]Other Excoriation, thin epidermis Edema: []None [x] 2+ non-pitting LLE&RLE Last BM: 11/5 Food Allergies: [x]None []Other Anthropometrics:   
Weight Loss Metrics 11/6/2019 10/25/2019 10/25/2019 10/25/2019 9/30/2019 9/18/2019 9/16/2019 Today's Wt 183 lb 3.2 oz - 193 lb 6.4 oz - 199 lb 14.4 oz 196 lb 199 lb BMI - 23.52 kg/m2 - 24.83 kg/m2 25.67 kg/m2 25.16 kg/m2 25.55 kg/m2 Last 3 Recorded Weights in this Encounter 11/04/19 0400 11/05/19 0200 11/06/19 0600 Weight: 84.1 kg (185 lb 6.5 oz) 84.5 kg (186 lb 4.6 oz) 83.1 kg (183 lb 3.2 oz) Weight Source: Bed Height: 6' 2\" (188 cm), Body mass index is 23.52 kg/m². IBW : 86.2 kg (190 lb), % IBW (Calculated): 96.32 % 
 ,   
 
Labs:   
Lab Results Component Value Date/Time Sodium 132 (L) 11/06/2019 04:43 AM  
 Potassium 3.9 11/06/2019 04:43 AM  
 Chloride 95 (L) 11/06/2019 04:43 AM  
 CO2 33 (H) 11/06/2019 04:43 AM  
 Glucose 83 11/06/2019 04:43 AM  
 BUN 18 11/06/2019 04:43 AM  
 Creatinine 1.03 11/06/2019 04:43 AM  
 Calcium 8.5 11/06/2019 04:43 AM  
 Magnesium 2.3 11/06/2019 04:43 AM  
 Phosphorus 2.4 (L) 06/04/2019 04:09 AM  
 Albumin 2.3 (L) 11/06/2019 04:43 AM  
 
Lab Results Component Value Date/Time Hemoglobin A1c 6.6 (H) 10/25/2019 02:56 PM  
 
Lab Results Component Value Date/Time ALT (SGPT) 27 11/06/2019 04:43 AM  
 AST (SGOT) 42 (H) 11/06/2019 04:43 AM  
 Alk. phosphatase 139 (H) 11/06/2019 04:43 AM  
 Bilirubin, total 1.6 (H) 11/06/2019 04:43 AM  
  
 
[facility]  Dietetic Intern

## 2019-11-06 NOTE — PROGRESS NOTES
[address] [facility], [address] GI PROGRESS NOTE Em Harvey, [address] [place] [address] office [phone] NP/PA in-hospital cell phone M-F until 4:30PM 
After 5PM or on weekends, please call  for physician on call NAME: Aydee Diaz :  1950 MRN:  [id number] Subjective:  
Patient is up sitting in the chair. No nausea or abdominal pain. He is having bowel movements, no melena or hematochezia. He says he is feeling better than he has in several months. Objective: VITALS:  
Last 24hrs VS reviewed since prior progress note. Most recent are: 
Visit Vitals BP (!) 113/103 Pulse 77 Temp 98.9 °F (37.2 °C) Resp 20 Ht 6' 2\" (1.88 m) Wt 83.1 kg (183 lb 3.2 oz) SpO2 98% BMI 23.52 kg/m² PHYSICAL EXAM: 
General:          Cooperative, no acute distress   
Neurologic:      Alert and oriented HEENT:           EOMI, no scleral icterus Lungs:             Diminished bilaterally anteriorly Heart:              S1 S2 Abdomen:        Soft, non-distended, no tenderness, no guarding, no rebound. +Bowel sounds. Extremities:     Warm Psych:             Not anxious or agitated Lab Data Reviewed:  
 
Recent Results (from the past 24 hour(s)) EKG, 12 LEAD, INITIAL Collection Time: 19 10:44 AM  
Result Value Ref Range Ventricular Rate 83 BPM  
 Atrial Rate 83 BPM  
 P-R Interval 80 ms QRS Duration 162 ms Q-T Interval 502 ms QTC Calculation (Bezet) 589 ms Calculated R Axis 2 degrees Calculated T Axis -156 degrees Diagnosis Electronic ventricular pacemaker Marked T wave abnormality, consider  
anterolateral ischemia When compared with ECG of 2019 10:42, No significant change was found Confirmed by Catalina Chapa M.D., [facility]ida ([id number]) on 2019 12:25:52 PM 
  
GLUCOSE, POC Collection Time: 19  1:35 PM  
Result Value Ref Range Glucose (POC) 132 (H) 65 - 100 mg/dL Performed by Yamel Chua GLUCOSE, POC Collection Time: 11/05/19  5:59 PM  
Result Value Ref Range Glucose (POC) 95 65 - 100 mg/dL Performed by Yamel Chua POC FECAL OCCULT BLOOD Collection Time: 11/05/19  6:08 PM  
Result Value Ref Range Occult blood, stool (POC) POSITIVE (A) NEG    
GLUCOSE, POC Collection Time: 11/05/19  9:01 PM  
Result Value Ref Range Glucose (POC) 108 (H) 65 - 100 mg/dL Performed by Jose Castillo LD Collection Time: 11/06/19  4:43 AM  
Result Value Ref Range  (H) 85 - 241 U/L  
NT-PRO BNP Collection Time: 11/06/19  4:43 AM  
Result Value Ref Range NT pro-BNP 6,746 (H) <125 PG/ML  
CBC W/O DIFF Collection Time: 11/06/19  4:43 AM  
Result Value Ref Range WBC 4.9 4.1 - 11.1 K/uL  
 RBC 2.76 (L) 4.10 - 5.70 M/uL HGB 8.0 (L) 12.1 - 17.0 g/dL HCT 26.0 (L) 36.6 - 50.3 % MCV 94.2 80.0 - 99.0 FL  
 MCH 29.0 26.0 - 34.0 PG  
 MCHC 30.8 30.0 - 36.5 g/dL  
 RDW 19.4 (H) 11.5 - 14.5 % PLATELET 84 (L) 840 - 400 K/uL MPV 11.0 8.9 - 12.9 FL  
 NRBC 0.0 0  WBC ABSOLUTE NRBC 0.00 0.00 - 0.01 K/uL PROTHROMBIN TIME + INR Collection Time: 11/06/19  4:43 AM  
Result Value Ref Range INR 1.1 0.9 - 1.1 Prothrombin time 11.4 (H) 9.0 - 11.1 sec PTT Collection Time: 11/06/19  4:43 AM  
Result Value Ref Range aPTT 27.7 22.1 - 32.0 sec  
 aPTT, therapeutic range     58.0 - 77.0 SECS  
MAGNESIUM Collection Time: 11/06/19  4:43 AM  
Result Value Ref Range Magnesium 2.3 1.6 - 2.4 mg/dL METABOLIC PANEL, COMPREHENSIVE Collection Time: 11/06/19  4:43 AM  
Result Value Ref Range Sodium 132 (L) 136 - 145 mmol/L Potassium 3.9 3.5 - 5.1 mmol/L Chloride 95 (L) 97 - 108 mmol/L  
 CO2 33 (H) 21 - 32 mmol/L Anion gap 4 (L) 5 - 15 mmol/L Glucose 83 65 - 100 mg/dL BUN 18 6 - 20 MG/DL Creatinine 1.03 0.70 - 1.30 MG/DL  
 BUN/Creatinine ratio 17 12 - 20 GFR est AA >60 >60 ml/min/1.73m2 GFR est non-AA >60 >60 ml/min/1.73m2 Calcium 8.5 8.5 - 10.1 MG/DL Bilirubin, total 1.6 (H) 0.2 - 1.0 MG/DL  
 ALT (SGPT) 27 12 - 78 U/L  
 AST (SGOT) 42 (H) 15 - 37 U/L Alk. phosphatase 139 (H) 45 - 117 U/L Protein, total 5.5 (L) 6.4 - 8.2 g/dL Albumin 2.3 (L) 3.5 - 5.0 g/dL Globulin 3.2 2.0 - 4.0 g/dL A-G Ratio 0.7 (L) 1.1 - 2.2 LACTIC ACID Collection Time: 11/06/19  4:50 AM  
Result Value Ref Range Lactic acid 0.7 0.4 - 2.0 MMOL/L  
GLUCOSE, POC Collection Time: 11/06/19  6:39 AM  
Result Value Ref Range Glucose (POC) 98 65 - 100 mg/dL Performed by Jovanny Barry Assessment:  
· LVAD evaluation: Impella in place. · Iron deficiency anemia: Hgb 8.0, platelets 84. Status post IV iron. No PRBC transfusions. Bivalirudin on hold. No signs of active GI bleeding. · Thrombocytopenia: status post 1 unit platelets 36/24/32. Platelets 84 - acceptable for procedures. · History of colon polyps: Colonoscopy in 2009 with reported tubular adenomas. · Acute on chronic systolic heart failure · History of VF arrest status post AICD · Coronary artery disease status post CABG in 2010 · Atrial fibrillation · Acute kidney injury on chronic kidney disease · Urinary tract infection/hematuria: ID following. On Cefepime. Patient Active Problem List  
Diagnosis Code  Paroxysmal atrial fibrillation (HCC) I48.0  Acute on chronic systolic CHF (congestive heart failure) (HCC) I50.23  Systolic CHF, chronic (HCC) I50.22  
 Peripheral vascular disease (HCC) I73.9  Acute decompensated heart failure (HCC) I50.9 Plan: · Monitor CBC. No signs of active GI bleeding. · Discussed with Anil Pruitt, will aim for procedures next week Signed By: Dennise Chaidez NP   
 11/6/2019  10:07 AM  
  
 
 This patient was seen and examined by me in a face-to-face visit today.  I reviewed the medical record including lab work, imaging and other provider notes. I confirmed the interval history as described above. I spoke to the patient, discussing our findings and plans. I discussed this case in detail with nereaj alamo. I formulated an updated  assessment of this patient and guided our treatment plan. I agree with the above progress note. I agree with the history, exam and assessment and plan as outlined in the note. I would like to add the following:  
Abdomen soft No active GI bleed Will follow

## 2019-11-06 NOTE — PROGRESS NOTES
Urology Progress Note Patient: Anya Bingham MRN: [id number]  SSN: [id number] YOB: 1950  Age: 71 y.o. Sex: male ADMITTED: 10/25/2019 to Mitch Ham MD for [facility]ens[facility] [I50.9] POD# 8 Days Post-Op Procedure(s): RIGHT AXILLARY IMPELLA INSERTION He feels well and has no complaints. Catheter draining well but more bloody than yesterday. No pain. Hgb 8.0. Plt 84K. Vitals: Temp (24hrs), Av.7 °F (37.1 °C), Min:98.1 °F (36.7 °C), Max:98.9 °F (37.2 °C) Blood pressure (!) 88/69, pulse 77, temperature 98.9 °F (37.2 °C), resp. rate 20, height 6' 2\" (1.88 m), weight 83.1 kg (183 lb 3.2 oz), SpO2 90 %. Intake and Output: 
 1901 -  0700 In: 1661.6 [P.O.:236; I.V.:1425.6] Out: 3765 [[facility]:7921] Abd - benign. Bladder not distended. Labs: 
Labs:  
Lab Results Component Value Date/Time WBC 4.9 2019 04:43 AM  
 HGB 8.0 (L) 2019 04:43 AM  
 Creatinine 1.03 2019 04:43 AM  
 
 
 
Assessment/Plan: 
 Gross hematuria persists and seems a little worse today. Hand irrigate and start CBI. Is he ok from Cardiac standpoint to go to OR for cysto/fugluration if needed? Signed By: Juaerz Bright MD - 2019

## 2019-11-06 NOTE — PROGRESS NOTES
Physical Therapy 11/6/2019 Chart reviewed. Patient is currently returning to bed for echo. F/u tomorrow for PT session. Thank you.  
 
Claire Willett, PT, DPT

## 2019-11-06 NOTE — PROGRESS NOTES
[facility] 
 [address] [address] [facility], [address] [facility] Phone: [phone]   [phone]   
  
Nephrology Progress Note Sona Kiser     1950     [id number] Date of Admission : 10/25/2019 
11/06/19 CC:  Follow up for JUAN J, CKD, Hyponatremia Assessment and Plan JUAN J on CKD Stage IV : 
- likely from CRS 
- Cr stable and at baseline 
- diurese PRN 
- daily labs CKD IV at baseline w/ L renal atrophy: - NM scan shows equal function Gross hematuria: 
- slowly improving off angiomax 
- urology following Serratia and Enteroccocus UTI: 
- per ID Hyponatremia: 
- from hypervolemia and excessive fluid intake - FR and diurese PRN 
- daily Na levels Hoarseness, vocal cord paralysis, mediastinal adenopathy: 
- will need eventual PET/CT 
- per pulmonary 
  
BPH w/ urinary retention  
- neal in place 
  
Acute on Chronic HFrEF  
- EF 16-20%, NYHA Class IV , hx of VF arrest - s/p AICD 
- Impella insertion 10/29 
- LVAD eval underwaty 
  
JOSE on CPAP  
  
PAF s/p DCCV 6/19 
  
Chronic Anemia: 
- from CKD and iron deficiency - s/p IV iron, now on PAVEL Interval History:   
Seen and examined. Cr stable and Na stable, stable UOP. No cp, sob, n/v/d.  impella in place. Review of Systems: Pertinent items are noted in HPI. Current Medications:  
Current Facility-Administered Medications Medication Dose Route Frequency  bisacodyl (DULCOLAX) suppository 10 mg  10 mg Rectal DAILY  amiodarone (CORDARONE) tablet 100 mg  100 mg Oral DAILY  dronabinol (MARINOL) capsule 2.5 mg  2.5 mg Oral ACB&D  piperacillin-tazobactam (ZOSYN) 3.375 g in 0.9% sodium chloride (MBP/ADV) 100 mL  3.375 g IntraVENous Q8H  
 insulin lispro (HUMALOG) injection   SubCUTAneous AC&HS  bumetanide (BUMEX) injection 1 mg  1 mg IntraVENous Q6H PRN  
 fluticasone propionate (FLONASE) 50 mcg/actuation nasal spray 2 Spray  2 Spray Both Nostrils DAILY  sodium chloride (OCEAN) 0.65 % nasal squeeze bottle 2 Spray  2 Spray Both Nostrils QID  alteplase (CATHFLO) 1 mg in dextrose 5% 50 mL impella purge solution  1 mg Other TITRATE  epoetin yvonne-epbx (RETACRIT) injection 10,000 Units  10,000 Units SubCUTAneous Q TUE, THU & SAT  pantoprazole (PROTONIX) tablet 40 mg  40 mg Oral ACB  dextrose 5% infusion  4-20 mL/hr IntraVENous CONTINUOUS  
 bivalirudin (ANGIOMAX) 250 mg in dextrose 5% 250 mL infusion  4-20 mL/hr Other TITRATE  alteplase (CATHFLO) 1 mg in sterile water (preservative free) 1 mL injection  1 mg InterCATHeter PRN  
 bacitracin 500 unit/gram packet 1 Packet  1 Packet Topical PRN  
 sodium chloride (NS) flush 5-40 mL  5-40 mL IntraVENous Q8H  
 sodium chloride (NS) flush 5-40 mL  5-40 mL IntraVENous PRN  
 0.45% sodium chloride infusion  10 mL/hr IntraVENous CONTINUOUS  
 0.9% sodium chloride infusion  10 mL/hr IntraVENous CONTINUOUS  
 oxyCODONE IR (ROXICODONE) tablet 5 mg  5 mg Oral Q4H PRN  
 oxyCODONE IR (ROXICODONE) tablet 10 mg  10 mg Oral Q4H PRN  
 morphine injection 4 mg  4 mg IntraVENous Q2H PRN  
 naloxone (NARCAN) injection 0.4 mg  0.4 mg IntraVENous PRN  
 ondansetron (ZOFRAN) injection 4 mg  4 mg IntraVENous Q4H PRN  
 albuterol (PROVENTIL VENTOLIN) nebulizer solution 2.5 mg  2.5 mg Nebulization Q4H PRN  chlorhexidine (PERIDEX) 0.12 % mouthwash 10 mL  10 mL Oral Q12H  
 magnesium oxide (MAG-OX) tablet 400 mg  400 mg Oral BID  calcium chloride 1 g in 0.9% sodium chloride 250 mL IVPB  1 g IntraVENous PRN  
 bisacodyl (DULCOLAX) suppository 10 mg  10 mg Rectal DAILY PRN  
 senna-docusate (PERICOLACE) 8.6-50 mg per tablet 1 Tab  1 Tab Oral BID  polyethylene glycol (MIRALAX) packet 17 g  17 g Oral DAILY  ELECTROLYTE REPLACEMENT NOTE: Nurse to review Serum Potassium and Magnesuim levels and Initiate Electrolyte Replacement Protocol as needed  1 Each Other PRN  
  magnesium sulfate 1 g/100 ml IVPB (premix or compounded)  1 g IntraVENous PRN  
 glucose chewable tablet 16 g  4 Tab Oral PRN  
 glucagon (GLUCAGEN) injection 1 mg  1 mg IntraMUSCular PRN  
 dextrose 10% infusion 0-250 mL  0-250 mL IntraVENous PRN  
 morphine injection 2 mg  2 mg IntraVENous Q2H PRN  
 melatonin tablet 3 mg  3 mg Oral QHS PRN  
 albumin human 5% (BUMINATE) solution 25 g  25 g IntraVENous Q2H PRN  
 influenza vaccine 2019-20 (6 mos+)(PF) (FLUARIX/FLULAVAL/FLUZONE QUAD) injection 0.5 mL  0.5 mL IntraMUSCular PRIOR TO DISCHARGE  
 [Held by provider] aspirin delayed-release tablet 81 mg  81 mg Oral DAILY  tamsulosin (FLOMAX) capsule 0.8 mg  0.8 mg Oral DAILY  allopurinol (ZYLOPRIM) tablet 100 mg  100 mg Oral DAILY No Known Allergies Objective: 
Vitals:   
Vitals:  
 11/06/19 0400 11/06/19 0500 11/06/19 0600 11/06/19 0700 BP: 101/82 98/82 (!) 107/95 (!) 113/103 Pulse: 70 74 70 77 Resp: 18 19 14 20 Temp:      
SpO2: 93% 99% 100% 98% Weight:   83.1 kg (183 lb 3.2 oz) Height:      
 
Intake and Output: 
No intake/output data recorded. 11/04 1901 - 11/06 0700 In: 1644.5 [P.O.:236; I.V.:1408.5] Out: 3765 [ZGZLE:3083] Physical Examination: 
Pt intubated     No 
General: Awake and alert Neck:  Supple, no mass Resp:  Lungs few dependent rales CV:  RRR,  no murmur or rub, trace b/l LE edema GI:  Soft, NT, + Bowel sounds Neurologic:  AOx3, nonfocal exam 
Psych:             Normal mood and affect Skin:  No Rash :  Lizama w/ blood-tinged urine [x]    High complexity decision making was performed 
[]    Patient is at high-risk of decompensation with multiple organ involvement Lab Data Personally Reviewed: I have reviewed all the pertinent labs, microbiology data and radiology studies during assessment. Recent Labs 11/06/19 
3778 11/05/19 
3543 11/04/19 
0411 * 128* 130*  
K 3.9 4.3 4.2 CL 95* 93* 95* CO2 33* 33* 34* GLU 83 90 92 BUN 18 25* 25* CREA 1.03 1.07 1.19  
CA 8.5 8.5 8.7 MG 2.3 2.4 2.4 ALB 2.3* 2.4* 2.6* SGOT 42* 50* 50* ALT 27 28 23 INR 1.1 1.1 1.1 Recent Labs 11/06/19 
5833 11/05/19 
5176 11/04/19 
0411 WBC 4.9 5.3 5.5 HGB 8.0* 8.3* 8.8* HCT 26.0* 26.4* 28.1*  
PLT 84* 80* 86* No results found for: SDES Lab Results Component Value Date/Time Culture result: SERRATIA MARCESCENS (A) 10/31/2019 10:05 AM  
 Culture result: SERRATIA MARCESCENS (2ND COLONY TYPE/STRAIN) (A) 10/31/2019 10:05 AM  
 Culture result: ENTEROCOCCUS FAECALIS GROUP D (A) 10/31/2019 10:05 AM  
 Culture result: NO GROWTH 5 DAYS 10/25/2019 07:14 PM  
 Culture result: ENTEROBACTER CLOACAE (A) 06/26/2019 12:25 PM  
 
Recent Results (from the past 24 hour(s)) EKG, 12 LEAD, INITIAL Collection Time: 11/05/19 10:44 AM  
Result Value Ref Range Ventricular Rate 83 BPM  
 Atrial Rate 83 BPM  
 P-R Interval 80 ms QRS Duration 162 ms Q-T Interval 502 ms QTC Calculation (Bezet) 589 ms Calculated R Axis 2 degrees Calculated T Axis -156 degrees Diagnosis Electronic ventricular pacemaker Marked T wave abnormality, consider  
anterolateral ischemia When compared with ECG of 02-NOV-2019 10:42, No significant change was found Confirmed by Ria Saha M.D., [facility] ([id number]) on 11/5/2019 12:25:52 PM 
  
GLUCOSE, POC Collection Time: 11/05/19  1:35 PM  
Result Value Ref Range Glucose (POC) 132 (H) 65 - 100 mg/dL Performed by Catalina Vizcaino GLUCOSE, POC Collection Time: 11/05/19  5:59 PM  
Result Value Ref Range Glucose (POC) 95 65 - 100 mg/dL Performed by Catalina Vizcaino POC FECAL OCCULT BLOOD Collection Time: 11/05/19  6:08 PM  
Result Value Ref Range Occult blood, stool (POC) POSITIVE (A) NEG    
GLUCOSE, POC Collection Time: 11/05/19  9:01 PM  
Result Value Ref Range Glucose (POC) 108 (H) 65 - 100 mg/dL Performed by Zoila BRAVO  Collection Time: 11/06/19  4:43 AM  
 Result Value Ref Range  (H) 85 - 241 U/L  
NT-PRO BNP Collection Time: 11/06/19  4:43 AM  
Result Value Ref Range NT pro-BNP 6,746 (H) <125 PG/ML  
CBC W/O DIFF Collection Time: 11/06/19  4:43 AM  
Result Value Ref Range WBC 4.9 4.1 - 11.1 K/uL  
 RBC 2.76 (L) 4.10 - 5.70 M/uL HGB 8.0 (L) 12.1 - 17.0 g/dL HCT 26.0 (L) 36.6 - 50.3 % MCV 94.2 80.0 - 99.0 FL  
 MCH 29.0 26.0 - 34.0 PG  
 MCHC 30.8 30.0 - 36.5 g/dL  
 RDW 19.4 (H) 11.5 - 14.5 % PLATELET 84 (L) 315 - 400 K/uL MPV 11.0 8.9 - 12.9 FL  
 NRBC 0.0 0  WBC ABSOLUTE NRBC 0.00 0.00 - 0.01 K/uL PROTHROMBIN TIME + INR Collection Time: 11/06/19  4:43 AM  
Result Value Ref Range INR 1.1 0.9 - 1.1 Prothrombin time 11.4 (H) 9.0 - 11.1 sec PTT Collection Time: 11/06/19  4:43 AM  
Result Value Ref Range aPTT 27.7 22.1 - 32.0 sec  
 aPTT, therapeutic range     58.0 - 77.0 SECS  
MAGNESIUM Collection Time: 11/06/19  4:43 AM  
Result Value Ref Range Magnesium 2.3 1.6 - 2.4 mg/dL METABOLIC PANEL, COMPREHENSIVE Collection Time: 11/06/19  4:43 AM  
Result Value Ref Range Sodium 132 (L) 136 - 145 mmol/L Potassium 3.9 3.5 - 5.1 mmol/L Chloride 95 (L) 97 - 108 mmol/L  
 CO2 33 (H) 21 - 32 mmol/L Anion gap 4 (L) 5 - 15 mmol/L Glucose 83 65 - 100 mg/dL BUN 18 6 - 20 MG/DL Creatinine 1.03 0.70 - 1.30 MG/DL  
 BUN/Creatinine ratio 17 12 - 20 GFR est AA >60 >60 ml/min/1.73m2 GFR est non-AA >60 >60 ml/min/1.73m2 Calcium 8.5 8.5 - 10.1 MG/DL Bilirubin, total 1.6 (H) 0.2 - 1.0 MG/DL  
 ALT (SGPT) 27 12 - 78 U/L  
 AST (SGOT) 42 (H) 15 - 37 U/L Alk. phosphatase 139 (H) 45 - 117 U/L Protein, total 5.5 (L) 6.4 - 8.2 g/dL Albumin 2.3 (L) 3.5 - 5.0 g/dL Globulin 3.2 2.0 - 4.0 g/dL A-G Ratio 0.7 (L) 1.1 - 2.2 LACTIC ACID Collection Time: 11/06/19  4:50 AM  
Result Value Ref Range  Lactic acid 0.7 0.4 - 2.0 MMOL/L  
GLUCOSE, POC  
 Collection Time: 11/06/19  6:39 AM  
Result Value Ref Range Glucose (POC) 98 65 - 100 mg/dL Performed by Xenia Hoover Total time spent with patient:  xxx   min. Care Plan discussed with: 
Patient Family RN Consulting Physician [facility],      
 
I have reviewed the flowsheets. Chart and Pertinent Notes have been reviewed. No change in PMH ,family and social history from Consult note.  
 
 
Alonzo Kaplan MD

## 2019-11-06 NOTE — PROGRESS NOTES
NYHA class IV A/C systolic heart failure Low EF (10%) Inotrope dependent Malnutrition  
LVAD Work-up Epistaxis Hematuria Still having issues with hematuria Seems to be looking a little more healthy Hgb and platelets look good Creatinine normal 
 
Bilirubin improved Other LFTs look better as well LDH and lactic acid reasonable Pre-albumin still low NT pro-BNP continues to improve CXR - minimal pulmonary edema Impella - flow 4 L/min @ P-7 Intake/Output Summary (Last 24 hours) at 11/6/2019 1223 Last data filed at 11/6/2019 8860 Gross per 24 hour Intake 1586.3 ml Output 2520 ml Net -933.7 ml  
 
Visit Vitals BP (!) 88/69 Pulse 77 Temp 98.9 °F (37.2 °C) Resp 20 Ht 6' 2\" (1.88 m) Wt 183 lb 3.2 oz (83.1 kg) SpO2 90% BMI 23.52 kg/m² Risk of morbidity and mortality - high Medical decision making - high complexity Total critical care time - 30 minutes (CPT 29542)

## 2019-11-06 NOTE — PROGRESS NOTES
ID Progress Note 
2019 Subjective: No specific complaints today--seems to be doing ok Objective: Antibiotics: 1. Cefepime Vitals:  
Visit Vitals /85 Pulse 70 Temp 97.9 °F (36.6 °C) Resp 13 Ht 6' 2\" (1.88 m) Wt 83.1 kg (183 lb 3.2 oz) SpO2 95% BMI 23.52 kg/m² Tmax:  Temp (24hrs), Av.3 °F (36.8 °C), Min:97.8 °F (36.6 °C), Max:98.9 °F (37.2 °C) Exam:  Lungs:  clear to auscultation bilaterally Heart:  regular rate and rhythm Abdomen:  soft, non-tender. Bowel sounds normal. No masses,  no organomegaly Grossly bloody urine in catheter Labs:     
Recent Labs 19 
3726 19 
5973 19 
0411 WBC 4.9 5.3 5.5 HGB 8.0* 8.3* 8.8* PLT 84* 80* 86*  
BUN 18 25* 25* CREA 1.03 1.07 1.19 SGOT 42* 50* 50* * 143* 146* TBILI 1.6* 1.8* 1.9* Cultures: No results found for: SDES Lab Results Component Value Date/Time Culture result: SERRATIA MARCESCENS (A) 10/31/2019 10:05 AM  
 Culture result: SERRATIA MARCESCENS (2ND COLONY TYPE/STRAIN) (A) 10/31/2019 10:05 AM  
 Culture result: ENTEROCOCCUS FAECALIS GROUP D (A) 10/31/2019 10:05 AM  
 
 
Radiology:  
 
Line/Insert Date:        
 
Assessment:  
 
1. UTI--Serratia (2 biotypes) from culture and small amount of Enterococcus 2. CHF/cardiomyopathy Objective: 1. Continue current therapy Amy Bustamante MD

## 2019-11-06 NOTE — PROGRESS NOTES
1930: Bedside shift report received from [facility]. 
2000; Pt bathed with CHG. 0450: AM labs drawn. 0730: Bedside and Verbal shift change report given to Leydi RN (oncoming nurse) by Angie Connolly RN (offgoing nurse). Report included the following information SBAR, Kardex, MAR, Accordion, Recent Results and Med Rec Status.

## 2019-11-06 NOTE — PROGRESS NOTES
talked with Jenny Marina yesterday afternoon. He acknowledged meeting with a current LVAD patient was very helpful to him. He was also appreciative of the education he's been receiving on the pump. He shared he feels his wife is overwhelmed - that when she becomes stressed she tends to shut down and he feels that she's not retaining all of the information right now as she's worried about him.  called Steven Lagos this morning to follow up. Asked her how she's feeling. She reports she has an awful cold and likely will not visit [facility] today or tomorrow as she doesn't want to spread anything to him.  verbalized the LVAD equipment/management can be a lot to take in and she reaffirmed that it is. She verbalized she's aware the work up is not complete and until it is definitive that she will not overly concern herself with the information.  encouraged her to continue with the education should the implant occur but to partialize the information to not overwhelm herself at once. She is agreeable and shared she will take it one day at a time. She denies any questions/concerns about the LVAD caregiver contract which she signed and left in Sona's room. Juana Martinez, MSW, LCSW Clinical  [phone]

## 2019-11-07 NOTE — PROGRESS NOTES
0730 Bedside and Verbal shift change report given to Young RN (oncoming nurse) by Joe Rogers RN (offgoing nurse). Report included the following information SBAR, Kardex, Intake/Output, MAR, Recent Results and Cardiac Rhythm paced. 1000 Dr. Beth Gee at bedside - updated on pts status - orders received to start CBI 
1200 Pt started on CBI 
1930 Bedside and Verbal shift change report given to Brook RN (oncoming nurse) by Kathie Singh RN (offgoing nurse). Report included the following information SBAR, Kardex, Intake/Output, MAR, Recent Results and Cardiac Rhythm paced.

## 2019-11-07 NOTE — PROGRESS NOTES
0730 Bedside and Verbal shift change report given to Leydi RN (oncoming nurse) by KATRIN Doe (offgoing nurse). Report included the following information SBAR.  
7901 Spoke w/ Dr. Cui Seen, updated on pts Hgb 7.8 - orders received to transfuse 1u PRBC 
1200 Pt walked with PT  
1800 Repeat H&H drawn  
1930 Bedside and Verbal shift change report given to Thomas Mcarthur RN (oncoming nurse) by Cesar Burr RN (offgoing nurse). Report included the following information SBAR, Kardex, Procedure Summary, Intake/Output, MAR, Recent Results and Cardiac Rhythm paced.

## 2019-11-07 NOTE — PROGRESS NOTES
Problem: Falls - Risk of 
Goal: *Absence of Falls Description Document Ryan Prespatricio Fall Risk and appropriate interventions in the flowsheet. Outcome: Progressing Towards Goal 
Note:  
Fall Risk Interventions: 
Mobility Interventions: Communicate number of staff needed for ambulation/transfer Mentation Interventions: Door open when patient unattended Medication Interventions: Patient to call before getting OOB Elimination Interventions: Elevated toilet seat History of Falls Interventions: Consult care management for discharge planning, Assess for delayed presentation/identification of injury for 48 hrs (comment for end date) Problem: Patient Education: Go to Patient Education Activity Goal: Patient/Family Education Outcome: Progressing Towards Goal 
  
Problem: Diabetes Self-Management Goal: *Disease process and treatment process Description Define diabetes and identify own type of diabetes; list 3 options for treating diabetes. Outcome: Progressing Towards Goal 
Goal: *Incorporating nutritional management into lifestyle Description Describe effect of type, amount and timing of food on blood glucose; list 3 methods for planning meals. Outcome: Progressing Towards Goal 
Goal: *Incorporating physical activity into lifestyle Description State effect of exercise on blood glucose levels. Outcome: Progressing Towards Goal 
Goal: *Developing strategies to promote health/change behavior Description Define the ABC's of diabetes; identify appropriate screenings, schedule and personal plan for screenings. Outcome: Progressing Towards Goal 
Goal: *Using medications safely Description State effect of diabetes medications on diabetes; name diabetes medication taking, action and side effects. Outcome: Progressing Towards Goal 
Goal: *Monitoring blood glucose, interpreting and using results Description Identify recommended blood glucose targets  and personal targets. Outcome: Progressing Towards Goal 
Goal: *Prevention, detection, treatment of acute complications Description List symptoms of hyper- and hypoglycemia; describe how to treat low blood sugar and actions for lowering  high blood glucose level. Outcome: Progressing Towards Goal 
Goal: *Prevention, detection and treatment of chronic complications Description Define the natural course of diabetes and describe the relationship of blood glucose levels to long term complications of diabetes. Outcome: Progressing Towards Goal 
Goal: *Developing strategies to address psychosocial issues Description Describe feelings about living with diabetes; identify support needed and support network Outcome: Progressing Towards Goal 
Goal: *Insulin pump training Outcome: Progressing Towards Goal 
Goal: *Sick day guidelines Outcome: Progressing Towards Goal 
Goal: *Patient Specific Goal (EDIT GOAL, INSERT TEXT) Outcome: Progressing Towards Goal 
  
Problem: Patient Education: Go to Patient Education Activity Goal: Patient/Family Education Outcome: Progressing Towards Goal 
  
Problem: Pain Goal: *Control of Pain Outcome: Progressing Towards Goal 
Goal: *PALLIATIVE CARE:  Alleviation of Pain Outcome: Progressing Towards Goal 
  
Problem: Patient Education: Go to Patient Education Activity Goal: Patient/Family Education Outcome: Progressing Towards Goal 
  
Problem: Pressure Injury - Risk of 
Goal: *Prevention of pressure injury Description Document Mich Scale and appropriate interventions in the flowsheet. Outcome: Progressing Towards Goal 
Note:  
Pressure Injury Interventions: 
Sensory Interventions: Assess need for specialty bed Moisture Interventions: Apply protective barrier, creams and emollients Activity Interventions: Increase time out of bed Mobility Interventions: HOB 30 degrees or less Nutrition Interventions: Discuss nutritional consult with provider Friction and Shear Interventions: Apply protective barrier, creams and emollients Problem: Patient Education: Go to Patient Education Activity Goal: Patient/Family Education Outcome: Progressing Towards Goal 
  
Problem: Infection - Risk of, Multi-drug Resistant Organism Colonization (MDRO) Goal: *Absence of MDRO colonization Outcome: Progressing Towards Goal 
Goal: *Absence of infection signs and symptoms Outcome: Progressing Towards Goal 
  
Problem: Patient Education: Go to Patient Education Activity Goal: Patient/Family Education Outcome: Progressing Towards Goal 
  
Problem: Patient Education: Go to Patient Education Activity Goal: Patient/Family Education Outcome: Progressing Towards Goal 
  
Problem: Heart Failure: Day 5 Goal: Off Pathway (Use only if patient is Off Pathway) Outcome: Progressing Towards Goal 
Goal: Activity/Safety Outcome: Progressing Towards Goal 
Goal: Diagnostic Test/Procedures Outcome: Progressing Towards Goal 
Goal: Nutrition/Diet Outcome: Progressing Towards Goal 
Goal: Discharge Planning Outcome: Progressing Towards Goal 
Goal: Medications Outcome: Progressing Towards Goal 
Goal: Respiratory Outcome: Progressing Towards Goal 
Goal: Treatments/Interventions/Procedures Outcome: Progressing Towards Goal 
Goal: Psychosocial 
Outcome: Progressing Towards Goal 
  
Problem: Heart Failure: Discharge Outcomes Goal: *Demonstrates ability to perform prescribed activity without shortness of breath or discomfort Outcome: Progressing Towards Goal 
Goal: *Left ventricular function assessment completed prior to or during stay, or planned for post-discharge Outcome: Progressing Towards Goal 
Goal: *ACEI prescribed if LVEF less than 40% and no contraindications or ARB prescribed Outcome: Progressing Towards Goal 
Goal: *Verbalizes understanding and describes prescribed diet Outcome: Progressing Towards Goal 
 Goal: *Verbalizes understanding/describes prescribed medications Outcome: Progressing Towards Goal 
Goal: *Describes available resources and support systems Description 
(eg: Home Health, Palliative Care, Advanced Medical Directive) Outcome: Progressing Towards Goal 
Goal: *Describes smoking cessation resources Outcome: Progressing Towards Goal 
Goal: *Understands and describes signs and symptoms to report to providers(Stroke Metric) Outcome: Progressing Towards Goal 
Goal: *Describes/verbalizes understanding of follow-up/return appt Description 
(eg: to physicians, diabetes treatment coordinator, and other resources Outcome: Progressing Towards Goal 
Goal: *Describes importance of continuing daily weights and changes to report to physician Outcome: Progressing Towards Goal 
  
Problem: Patient Education: Go to Patient Education Activity Goal: Patient/Family Education Outcome: Progressing Towards Goal 
  
Problem: Discharge Planning Goal: *Discharge to safe environment Outcome: Progressing Towards Goal 
  
Problem: Patient Education: Go to Patient Education Activity Goal: Patient/Family Education Outcome: Progressing Towards Goal 
  
Problem: Infection - Risk of, Surgical Site Infection Goal: *Absence of surgical site infection signs and symptoms Outcome: Progressing Towards Goal 
  
Problem: Patient Education: Go to Patient Education Activity Goal: Patient/Family Education Outcome: Progressing Towards Goal 
  
Problem: Infection - Risk of, Surgical Site Infection Goal: *Absence of surgical site infection signs and symptoms Outcome: Progressing Towards Goal 
  
Problem: Patient Education: Go to Patient Education Activity Goal: Patient/Family Education Outcome: Progressing Towards Goal 
  
Problem: Patient Education: Go to Patient Education Activity Goal: Patient/Family Education Outcome: Progressing Towards Goal 
  
Problem: Nutrition Deficit Goal: *Optimize nutritional status Outcome: Progressing Towards Goal 
  
Problem: Nutrition Deficit Goal: *Optimize nutritional status Outcome: Progressing Towards Goal 
  
Problem: Nutrition Deficit Goal: *Optimize nutritional status Outcome: Progressing Towards Goal

## 2019-11-07 NOTE — PROGRESS NOTES
ID Progress Note 2019 Subjective: No specific complaints today--seems to be doing ok Objective: Antibiotics: 1. Cefepime Vitals:  
Visit Vitals /84 Pulse 76 Temp 98.6 °F (37 °C) Resp 14 Ht 6' 2\" (1.88 m) Wt 85.7 kg (188 lb 15 oz) SpO2 94% BMI 24.26 kg/m² Tmax:  Temp (24hrs), Av.1 °F (36.7 °C), Min:97.6 °F (36.4 °C), Max:98.6 °F (37 °C) Exam:  Lungs:  clear to auscultation bilaterally Heart:  regular rate and rhythm Abdomen:  soft, non-tender. Bowel sounds normal. No masses,  no organomegaly Grossly bloody urine in catheter Labs:     
Recent Labs 19 
1754 19 
0404 19 
0443 19 
1896 WBC  --  4.6 4.9 5.3 HGB 8.5* 7.8* 8.0* 8.3*  
PLT  --  90* 84* 80* BUN  --  18 18 25* CREA  --  1.19 1.03 1.07  
SGOT  --  40* 42* 50* AP  --  137* 139* 143* TBILI  --  1.5* 1.6* 1.8* Cultures: No results found for: [facility] Lab Results Component Value Date/Time Culture result: SERRATIA MARCESCENS (A) 10/31/2019 10:05 AM  
 Culture result: SERRATIA MARCESCENS (2ND COLONY TYPE/STRAIN) (A) 10/31/2019 10:05 AM  
 Culture result: ENTEROCOCCUS FAECALIS GROUP D (A) 10/31/2019 10:05 AM  
 
 
Radiology:  
 
Line/Insert Date:        
 
Assessment:  
 
1. UTI--Serratia (2 biotypes) from culture and small amount of Enterococcus 2. CHF/cardiomyopathy Objective: 1. Continue current therapy Ferny Ruvalcaba MD

## 2019-11-07 NOTE — PROGRESS NOTES
[facility] 
 [address] [facility] [address], [facility] [address] [facility] Phone: [phone]   [phone]   
  
Nephrology Progress Note Sona Kiser     1950     [id number] Date of Admission : 10/25/2019 
11/07/19 CC:  Follow up for JUAN J, CKD, Hyponatremia Assessment and Plan JUAN J on CKD Stage IV : 
- likely from CRS 
- Cr stable and at baseline 
- diurese PRN 
- daily labs CKD IV at baseline w/ L renal atrophy: - NM scan shows equal function Gross hematuria: 
- on CBI now - per urology Serratia and Enteroccocus UTI: 
- per ID Hyponatremia: 
- from hypervolemia and excessive fluid intake - FR and diurese PRN 
- daily Na levels Hoarseness, vocal cord paralysis, mediastinal adenopathy: 
- will need eventual PET/CT 
- per pulmonary 
  
BPH w/ urinary retention  
- neal in place 
  
Acute on Chronic HFrEF  
- EF 16-20%, NYHA Class IV , hx of VF arrest - s/p AICD 
- Impella insertion 10/29 
- LVAD eval underwaty 
  
JOSE on CPAP  
  
PAF s/p DCCV 6/19 
  
Chronic Anemia: 
- from CKD and iron deficiency - s/p IV iron, now on PAVEL 
- transfuse PRN Interval History:   
Seen and examined. On CBI now. Cr stable.  hgb 7.8 . No cp, sob, n/v/d.  impella in place. Review of Systems: Pertinent items are noted in HPI. Current Medications:  
Current Facility-Administered Medications Medication Dose Route Frequency  0.9% sodium chloride infusion 250 mL  250 mL IntraVENous PRN  
 amiodarone (CORDARONE) tablet 100 mg  100 mg Oral DAILY  dronabinol (MARINOL) capsule 2.5 mg  2.5 mg Oral ACB&D  piperacillin-tazobactam (ZOSYN) 3.375 g in 0.9% sodium chloride (MBP/ADV) 100 mL  3.375 g IntraVENous Q8H  
 insulin lispro (HUMALOG) injection   SubCUTAneous AC&HS  bumetanide (BUMEX) injection 1 mg  1 mg IntraVENous Q6H PRN  
 fluticasone propionate (FLONASE) 50 mcg/actuation nasal spray 2 Spray  2 Spray Both Nostrils DAILY  sodium chloride (OCEAN) 0.65 % nasal squeeze bottle 2 Spray  2 Spray Both Nostrils QID  alteplase (CATHFLO) 1 mg in dextrose 5% 50 mL impella purge solution  1 mg Other TITRATE  epoetin yvonne-epbx (RETACRIT) injection 10,000 Units  10,000 Units SubCUTAneous Q TUE, THU & SAT  pantoprazole (PROTONIX) tablet 40 mg  40 mg Oral ACB  dextrose 5% infusion  4-20 mL/hr IntraVENous CONTINUOUS  
 bivalirudin (ANGIOMAX) 250 mg in dextrose 5% 250 mL infusion  4-20 mL/hr Other TITRATE  alteplase (CATHFLO) 1 mg in sterile water (preservative free) 1 mL injection  1 mg InterCATHeter PRN  
 bacitracin 500 unit/gram packet 1 Packet  1 Packet Topical PRN  
 sodium chloride (NS) flush 5-40 mL  5-40 mL IntraVENous Q8H  
 sodium chloride (NS) flush 5-40 mL  5-40 mL IntraVENous PRN  
 0.45% sodium chloride infusion  10 mL/hr IntraVENous CONTINUOUS  
 0.9% sodium chloride infusion  10 mL/hr IntraVENous CONTINUOUS  
 oxyCODONE IR (ROXICODONE) tablet 5 mg  5 mg Oral Q4H PRN  
 oxyCODONE IR (ROXICODONE) tablet 10 mg  10 mg Oral Q4H PRN  
 morphine injection 4 mg  4 mg IntraVENous Q2H PRN  
 naloxone (NARCAN) injection 0.4 mg  0.4 mg IntraVENous PRN  
 ondansetron (ZOFRAN) injection 4 mg  4 mg IntraVENous Q4H PRN  
 albuterol (PROVENTIL VENTOLIN) nebulizer solution 2.5 mg  2.5 mg Nebulization Q4H PRN  chlorhexidine (PERIDEX) 0.12 % mouthwash 10 mL  10 mL Oral Q12H  
 magnesium oxide (MAG-OX) tablet 400 mg  400 mg Oral BID  calcium chloride 1 g in 0.9% sodium chloride 250 mL IVPB  1 g IntraVENous PRN  
 bisacodyl (DULCOLAX) suppository 10 mg  10 mg Rectal DAILY PRN  
 senna-docusate (PERICOLACE) 8.6-50 mg per tablet 1 Tab  1 Tab Oral BID  polyethylene glycol (MIRALAX) packet 17 g  17 g Oral DAILY  ELECTROLYTE REPLACEMENT NOTE: Nurse to review Serum Potassium and Magnesuim levels and Initiate Electrolyte Replacement Protocol as needed  1 Each Other PRN  
  magnesium sulfate 1 g/100 ml IVPB (premix or compounded)  1 g IntraVENous PRN  
 glucose chewable tablet 16 g  4 Tab Oral PRN  
 glucagon (GLUCAGEN) injection 1 mg  1 mg IntraMUSCular PRN  
 dextrose 10% infusion 0-250 mL  0-250 mL IntraVENous PRN  
 morphine injection 2 mg  2 mg IntraVENous Q2H PRN  
 melatonin tablet 3 mg  3 mg Oral QHS PRN  
 albumin human 5% (BUMINATE) solution 25 g  25 g IntraVENous Q2H PRN  
 influenza vaccine 2019-20 (6 mos+)(PF) (FLUARIX/FLULAVAL/FLUZONE QUAD) injection 0.5 mL  0.5 mL IntraMUSCular PRIOR TO DISCHARGE  
 [Held by provider] aspirin delayed-release tablet 81 mg  81 mg Oral DAILY  tamsulosin (FLOMAX) capsule 0.8 mg  0.8 mg Oral DAILY  allopurinol (ZYLOPRIM) tablet 100 mg  100 mg Oral DAILY No Known Allergies Objective: 
Vitals:   
Vitals:  
 11/07/19 0600 11/07/19 0700 11/07/19 0800 11/07/19 0910 BP: 106/78 92/79 (!) 117/97 93/74 Pulse: 73 73 93 77 Resp: 23 16 10 22 Temp:   98.3 °F (36.8 °C) 98.1 °F (36.7 °C) SpO2: 100% 100% 95% 97% Weight:      
Height:      
 
Intake and Output: 
No intake/output data recorded. 11/05 1901 - 11/07 0700 In: 16346.9 [P.O.:236; I.V.:2366.9] Out: 51379 [Urine:2520] Physical Examination: 
Pt intubated     No 
General: Awake and alert Neck:  Supple, no mass Resp:  Lungs few dependent rales CV:  RRR,  no murmur or rub, trace b/l LE edema GI:  Soft, NT, + Bowel sounds Neurologic:  AOx3, nonfocal exam 
Psych:             Normal mood and affect Skin:  No Rash :  Lizama w/ blood-tinged urine [x]    High complexity decision making was performed 
[]    Patient is at high-risk of decompensation with multiple organ involvement Lab Data Personally Reviewed: I have reviewed all the pertinent labs, microbiology data and radiology studies during assessment. Recent Labs 11/07/19 
0404 11/06/19 
1162 11/05/19 
6852 * 132* 128* K 3.6 3.9 4.3 CL 98 95* 93* CO2 33* 33* 33* GLU 90 83 90  
 BUN 18 18 25* CREA 1.19 1.03 1.07  
CA 8.7 8.5 8.5 MG 2.3 2.3 2.4 ALB 2.4* 2.3* 2.4* SGOT 40* 42* 50* ALT 30 27 28 INR 1.1 1.1 1.1 Recent Labs 11/07/19 
0404 11/06/19 
3997 11/05/19 
6645 WBC 4.6 4.9 5.3 HGB 7.8* 8.0* 8.3* HCT 25.4* 26.0* 26.4*  
PLT 90* 84* 80* No results found for: [facility] Lab Results Component Value Date/Time Culture result: SERRATIA MARCESCENS (A) 10/31/2019 10:05 AM  
 Culture result: SERRATIA MARCESCENS (2ND COLONY TYPE/STRAIN) (A) 10/31/2019 10:05 AM  
 Culture result: ENTEROCOCCUS FAECALIS GROUP D (A) 10/31/2019 10:05 AM  
 Culture result: NO GROWTH 5 DAYS 10/25/2019 07:14 PM  
 Culture result: ENTEROBACTER CLOACAE (A) 06/26/2019 12:25 PM  
 
Recent Results (from the past 24 hour(s)) POC G3 - PUL Collection Time: 11/06/19  9:56 AM  
Result Value Ref Range FIO2 (POC) 21 % pH (POC) 7.453 (H) 7.35 - 7.45    
 pCO2 (POC) 38.4 35.0 - 45.0 MMHG  
 pO2 (POC) 86 80 - 100 MMHG  
 HCO3 (POC) 26.9 (H) 22 - 26 MMOL/L  
 sO2 (POC) 97 92 - 97 % Base excess (POC) 3 mmol/L Site RIGHT BRACHIAL Device: ROOM AIR Allens test (POC) YES Specimen type (POC) ARTERIAL Total resp. rate 22 GLUCOSE, POC Collection Time: 11/06/19 12:35 PM  
Result Value Ref Range Glucose (POC) 145 (H) 65 - 100 mg/dL Performed by [facility]   
ECHO ADULT COMPLETE Collection Time: 11/06/19  3:00 PM  
Result Value Ref Range LA Volume 106.99 18 - 58 mL  
 LV E' Lateral Velocity 7.91 cm/s LV E' Septal Velocity 3.62 cm/s Tapse 1.40 (A) 1.5 - 2.0 cm LVIDd 7.36 (A) 4.2 - 5.9 cm  
 LVPWd 1.05 (A) 0.6 - 1.0 cm LVIDs 6.76 cm IVSd 1.01 (A) 0.6 - 1.0 cm  
 MVA (PHT) 3.5 cm2  
 MV A Isaac 29.53 cm/s  
 MV E Isaac 95.32 cm/s  
 MV E/A 3.20 RVIDd 5.40 cm LA Vol 4C 104.55 (A) 18 - 58 mL  
 LA Vol 2C 89.85 (A) 18 - 58 mL  
 LA Area 4C 29.8 cm2 LV Mass .3 (A) 88 - 224 g LV Mass AL Index 210.8 49 - 115 g/m2 E/E' lateral 12.05   
 E/E' septal 26.33   
 RVSP 60.9 mmHg E/E' ratio (averaged) 19.19 Est. RA Pressure 10.0 mmHg Mitral Regurgitant Peak Velocity 410.81 cm/s Mitral Valve E Wave Deceleration Time 214.2 ms Mitral Valve Pressure Half-time 62.1 ms  
 Triscuspid Valve Regurgitation Peak Gradient 50.9 mmHg Pulmonic Valve Max Velocity 47.81 cm/s  
 TR Max Velocity 356.73 cm/s  
 LA Vol Index 51.10 16 - 28 ml/m2 PASP 60.9 mmHg LA Vol Index 42.92 16 - 28 ml/m2 LA Vol Index 49.94 16 - 28 ml/m2 MR Peak Gradient 67.5 mmHg Left Ventricular Fractional Shortening by 2D [id number] % PV End Diastolic Velocity 1.2 mmHg Mitral Valve Deceleration [place] [id number] Pulmonary Artery Mean Presure 30.5 mmHg PI End Diastolic Pressure 33.9 mmHg PV peak gradient 0.9 mmHg GLUCOSE, POC Collection Time: 11/06/19  4:29 PM  
Result Value Ref Range Glucose (POC) 104 (H) 65 - 100 mg/dL Performed by Armand Funes, POC Collection Time: 11/06/19  9:50 PM  
Result Value Ref Range Glucose (POC) 119 (H) 65 - 100 mg/dL Performed by Isaias Elliott LD Collection Time: 11/07/19  4:04 AM  
Result Value Ref Range  (H) 85 - 241 U/L  
NT-PRO BNP Collection Time: 11/07/19  4:04 AM  
Result Value Ref Range NT pro-BNP 6,654 (H) <125 PG/ML  
LACTIC ACID Collection Time: 11/07/19  4:04 AM  
Result Value Ref Range Lactic acid 1.0 0.4 - 2.0 MMOL/L  
CBC W/O DIFF Collection Time: 11/07/19  4:04 AM  
Result Value Ref Range WBC 4.6 4.1 - 11.1 K/uL  
 RBC 2.69 (L) 4.10 - 5.70 M/uL HGB 7.8 (L) 12.1 - 17.0 g/dL HCT 25.4 (L) 36.6 - 50.3 % MCV 94.4 80.0 - 99.0 FL  
 MCH 29.0 26.0 - 34.0 PG  
 MCHC 30.7 30.0 - 36.5 g/dL  
 RDW 19.7 (H) 11.5 - 14.5 % PLATELET 90 (L) 059 - 400 K/uL MPV 10.0 8.9 - 12.9 FL  
 NRBC 0.0 0  WBC ABSOLUTE NRBC 0.00 0.00 - 0.01 K/uL METABOLIC PANEL, COMPREHENSIVE  
 Collection Time: 11/07/19  4:04 AM  
Result Value Ref Range Sodium 135 (L) 136 - 145 mmol/L Potassium 3.6 3.5 - 5.1 mmol/L Chloride 98 97 - 108 mmol/L  
 CO2 33 (H) 21 - 32 mmol/L Anion gap 4 (L) 5 - 15 mmol/L Glucose 90 65 - 100 mg/dL BUN 18 6 - 20 MG/DL Creatinine 1.19 0.70 - 1.30 MG/DL  
 BUN/Creatinine ratio 15 12 - 20 GFR est AA >60 >60 ml/min/1.73m2 GFR est non-AA >60 >60 ml/min/1.73m2 Calcium 8.7 8.5 - 10.1 MG/DL Bilirubin, total 1.5 (H) 0.2 - 1.0 MG/DL  
 ALT (SGPT) 30 12 - 78 U/L  
 AST (SGOT) 40 (H) 15 - 37 U/L Alk. phosphatase 137 (H) 45 - 117 U/L Protein, total 5.5 (L) 6.4 - 8.2 g/dL Albumin 2.4 (L) 3.5 - 5.0 g/dL Globulin 3.1 2.0 - 4.0 g/dL A-G Ratio 0.8 (L) 1.1 - 2.2 PROTHROMBIN TIME + INR Collection Time: 11/07/19  4:04 AM  
Result Value Ref Range INR 1.1 0.9 - 1.1 Prothrombin time 11.5 (H) 9.0 - 11.1 sec PTT Collection Time: 11/07/19  4:04 AM  
Result Value Ref Range aPTT 26.5 22.1 - 32.0 sec  
 aPTT, therapeutic range     58.0 - 77.0 SECS  
MAGNESIUM Collection Time: 11/07/19  4:04 AM  
Result Value Ref Range Magnesium 2.3 1.6 - 2.4 mg/dL PROCALCITONIN Collection Time: 11/07/19  4:04 AM  
Result Value Ref Range Procalcitonin 0.1 ng/mL TYPE & SCREEN Collection Time: 11/07/19  4:04 AM  
Result Value Ref Range Crossmatch Expiration 11/10/2019 ABO/Rh(D) O POSITIVE Antibody screen POS Antibody ID NO ADDITIONAL ANTIBODIES DETECTED Comment Previously identified Nonspecific Antibody and Nonspecific Cold Antibody Unit number [id number] Blood component type RC LR Unit division 00 Status of unit ISSUED Crossmatch result Compatible GLUCOSE, POC Collection Time: 11/07/19  6:13 AM  
Result Value Ref Range Glucose (POC) 135 (H) 65 - 100 mg/dL Performed by Castillo Mejía Total time spent with patient:  xxx   min. Care Plan discussed with: 
Patient Family RN Consulting Physician [id number] [facility]     
 
I have reviewed the flowsheets. Chart and Pertinent Notes have been reviewed. No change in PMH ,family and social history from Consult note.  
 
 
Angelika Howell MD

## 2019-11-07 NOTE — PROGRESS NOTES
Problem: Mobility Impaired (Adult and Pediatric) Goal: *Acute Goals and Plan of Care (Insert Text) Description FUNCTIONAL STATUS PRIOR TO ADMISSION: Patient was modified independent using a single point cane for functional mobility. Patient reports an increasingly sedentary lifestyle 2* fatigue and SOB/dyspnea. Retired (so is his wife). Patient reports x 3 falls within the last couple of weeks. Patient is wearing either nasal cannula or CPAP at night. LVAD work-up has been initiated. HOME SUPPORT PRIOR TO ADMISSION: The patient lived with his wife, but did not require physical assistance. Physical Therapy Goals Initiated 10/27/2019 1. Patient will move from supine to sit and sit to supine, scoot up and down and roll side to side in bed with independence within 7 days. 2.  Patient will perform sit to/from stand with supervision/set-up within 7 days. 3.  Patient will ambulate 150 feet with least restrictive assistive device and supervision/set-up within 7 days. 4.  Patient will ascend/descend 4 stairs with  handrail(s) with supervision/set-up within 7 days for functional strengthening and community reintegration. Roger Martines 5.  Patient will verbally and functionally recall 3/3 sternal precautions within 7 days in preparation for LVAD implantation. 6.  Patient will perform a mock power exchange for power module to/from battery with supervision/set-up within 7 days in preparation for LVAD implantation. Outcome: Progressing Towards Goal 
  
PHYSICAL THERAPY TREATMENT Patient: Corby David (75 y.o. male) Date: 11/7/2019 Diagnosis: Acute decompensated heart failure ([facility] [address] [I50.9] <principal problem not specified> Procedure(s) (LRB): 
RIGHT AXILLARY IMPELLA INSERTION (Right) 9 Days Post-Op Precautions: Fall(R axillary impella) Chart, physical therapy assessment, plan of care and goals were reviewed. ASSESSMENT Patient continues with skilled PT services and is progressing towards goals. Pt was able to increase gait tolerance. Noted decrease LE stability. Pt is requiring rolling walker to assist with balance and stability. Pt is attempting sit to stand without UE support in preparation. SpO2 89-98%on room air with mobility Current Level of Function Impacting Discharge (mobility/balance): min A Other factors to consider for discharge: *LVAD work up PLAN : 
Patient continues to benefit from skilled intervention to address the above impairments. Continue treatment per established plan of care. to address goals. Recommendation for discharge: (in order for the patient to meet his/her long term goals) To be determined: * This discharge recommendation: IF patient discharges home will need the following DME: to be determined (TBD) SUBJECTIVE:  
Patient stated I can go further.  OBJECTIVE DATA SUMMARY:  
Critical Behavior: 
Neurologic State: Alert Orientation Level: Oriented X4 Cognition: Appropriate decision making, Appropriate for age attention/concentration, Appropriate safety awareness, Follows commands Safety/Judgement: Awareness of environment, Insight into deficits Functional Mobility Training: 
Bed Mobility: 
  
  
  
  
  
  
Transfers: 
Sit to Stand: Contact guard assistance Stand to Sit: Contact guard assistance Balance: 
Sitting: Intact Standing: Impaired Standing - Static: Good Standing - Dynamic : Fair Ambulation/Gait Training: 
Distance (ft): 150 Feet (ft) Assistive Device: Gait belt;Walker, rolling Ambulation - Level of Assistance: Minimal assistance(+ Line A) Gait Abnormalities: Decreased step clearance Stairs: Therapeutic Exercises:  
Standing toe/heel raises, mini squats Pain Rating: No complaints Activity Tolerance:  
Limited Please refer to the flowsheet for vital signs taken during this treatment. After treatment patient left in no apparent distress:  
Sitting in chair, Call bell within reach and Caregiver / family present COMMUNICATION/COLLABORATION:  
The patients plan of care was discussed with: Registered Nurse Jose Roberto Cortes PTA Time Calculation: 25 mins

## 2019-11-07 NOTE — PROGRESS NOTES
1930 Bedside and Verbal shift change report given to Cat RN (oncoming nurse) by Leander Graham RN (offgoing nurse). Report included the following information SBAR.  
 
2300 CVP 12. PRN bumex given. 0000 Pt on CPAP 
 
0400 Labs drawn 
 
0500 Electrolytes replaced per protocol 0730 Bedside and Verbal shift change report given to Leydi RN (oncoming nurse) by Cat RN (offgoing nurse). Report included the following information SBAR.

## 2019-11-07 NOTE — PROGRESS NOTES
Occupational Therapy Chart reviewed; patient RN training with Palmer August; will retry later as able.  Jonh Jacobs OTR/L

## 2019-11-07 NOTE — CONSULTS
Name: [facility]: [address]  
: 1950 Admit Date: 10/25/2019 Phone: [phone]  Room: [address] [place] [address] PCP: Conrado Story MD  MRN: [id number] Date: 2019  Code: Full Code HPI: 
 
Alert, pleasant Sitting up in chair on room air Past Medical History:  
Diagnosis Date  Degenerative disc disease, lumbar  Heart failure ([address]  High cholesterol  Hypertension  Paroxysmal atrial fibrillation ([address] 2019  Spinal stenosis Past Surgical History:  
Procedure Laterality Date  HX APPENDECTOMY  HX CORONARY ARTERY BYPASS GRAFT    
 triple  HX HERNIA REPAIR    
 HX IMPLANTABLE CARDIOVERTER DEFIBRILLATOR  TN CARDIOVERSION ELECTIVE ARRHYTHMIA EXTERNAL N/A 6/10/2019 EP CARDIOVERSION performed by Lavinia Keene MD at [address] CATH LAB  TN CARDIOVERSION ELECTIVE ARRHYTHMIA EXTERNAL N/A 2019 EP CARDIOVERSION performed by Fabian Ng MD at [address] CATH LAB  TN INSJ/RPLCMT PERM DFB W/TRNSVNS LDS 1/DUAL CHMBR N/A 2019 INSERT ICD BIV MULTI performed by Renee Garcia MD at [address] CATH LAB  TN TCAT IMPL WRLS P-ART PRS SNR L-T HEMODYN MNTR N/A 2019 IMPLANT HEART FAILURE MONITORING DEVICE performed by Dina Arizmendi MD at [address] CATH LAB Family History Problem Relation Age of Onset  Lung Disease Mother  Hypertension Mother Blake Arthritis-osteo Mother  Heart Disease Mother  Heart Disease Father  Diabetes Father Social History Tobacco Use  Smoking status: Former Smoker Last attempt to quit: 2010 Years since quittin.9  Smokeless tobacco: Never Used Substance Use Topics  Alcohol use: Not Currently Comment: rarely No Known Allergies Current Facility-Administered Medications Medication Dose Route Frequency  0.9% sodium chloride infusion 250 mL  250 mL IntraVENous PRN  
  amiodarone (CORDARONE) tablet 100 mg  100 mg Oral DAILY  dronabinol (MARINOL) capsule 2.5 mg  2.5 mg Oral ACB&D  piperacillin-tazobactam (ZOSYN) 3.375 g in 0.9% sodium chloride (MBP/ADV) 100 mL  3.375 g IntraVENous Q8H  
 insulin lispro (HUMALOG) injection   SubCUTAneous AC&HS  bumetanide (BUMEX) injection 1 mg  1 mg IntraVENous Q6H PRN  
 fluticasone propionate (FLONASE) 50 mcg/actuation nasal spray 2 Spray  2 Spray Both Nostrils DAILY  sodium chloride (OCEAN) 0.65 % nasal squeeze bottle 2 Spray  2 Spray Both Nostrils QID  alteplase (CATHFLO) 1 mg in dextrose 5% 50 mL impella purge solution  1 mg Other TITRATE  epoetin yvonne-epbx (RETACRIT) injection 10,000 Units  10,000 Units SubCUTAneous Q TUE, THU & SAT  pantoprazole (PROTONIX) tablet 40 mg  40 mg Oral ACB  dextrose 5% infusion  4-20 mL/hr IntraVENous CONTINUOUS  
 bivalirudin (ANGIOMAX) 250 mg in dextrose 5% 250 mL infusion  4-20 mL/hr Other TITRATE  alteplase (CATHFLO) 1 mg in sterile water (preservative free) 1 mL injection  1 mg InterCATHeter PRN  
 bacitracin 500 unit/gram packet 1 Packet  1 Packet Topical PRN  
 sodium chloride (NS) flush 5-40 mL  5-40 mL IntraVENous Q8H  
 sodium chloride (NS) flush 5-40 mL  5-40 mL IntraVENous PRN  
 0.45% sodium chloride infusion  10 mL/hr IntraVENous CONTINUOUS  
 0.9% sodium chloride infusion  10 mL/hr IntraVENous CONTINUOUS  
 oxyCODONE IR (ROXICODONE) tablet 5 mg  5 mg Oral Q4H PRN  
 oxyCODONE IR (ROXICODONE) tablet 10 mg  10 mg Oral Q4H PRN  
 morphine injection 4 mg  4 mg IntraVENous Q2H PRN  
 naloxone (NARCAN) injection 0.4 mg  0.4 mg IntraVENous PRN  
 ondansetron (ZOFRAN) injection 4 mg  4 mg IntraVENous Q4H PRN  
 albuterol (PROVENTIL VENTOLIN) nebulizer solution 2.5 mg  2.5 mg Nebulization Q4H PRN  chlorhexidine (PERIDEX) 0.12 % mouthwash 10 mL  10 mL Oral Q12H  
 magnesium oxide (MAG-OX) tablet 400 mg  400 mg Oral BID  
  calcium chloride 1 g in 0.9% sodium chloride 250 mL IVPB  1 g IntraVENous PRN  
 bisacodyl (DULCOLAX) suppository 10 mg  10 mg Rectal DAILY PRN  
 senna-docusate (PERICOLACE) 8.6-50 mg per tablet 1 Tab  1 Tab Oral BID  polyethylene glycol (MIRALAX) packet 17 g  17 g Oral DAILY  ELECTROLYTE REPLACEMENT NOTE: Nurse to review Serum Potassium and Magnesuim levels and Initiate Electrolyte Replacement Protocol as needed  1 Each Other PRN  
 magnesium sulfate 1 g/100 ml IVPB (premix or compounded)  1 g IntraVENous PRN  
 glucose chewable tablet 16 g  4 Tab Oral PRN  
 glucagon (GLUCAGEN) injection 1 mg  1 mg IntraMUSCular PRN  
 dextrose 10% infusion 0-250 mL  0-250 mL IntraVENous PRN  
 morphine injection 2 mg  2 mg IntraVENous Q2H PRN  
 melatonin tablet 3 mg  3 mg Oral QHS PRN  
 albumin human 5% (BUMINATE) solution 25 g  25 g IntraVENous Q2H PRN  
 influenza vaccine 2019-20 (6 mos+)(PF) (FLUARIX/FLULAVAL/FLUZONE QUAD) injection 0.5 mL  0.5 mL IntraMUSCular PRIOR TO DISCHARGE  
 [Held by provider] aspirin delayed-release tablet 81 mg  81 mg Oral DAILY  tamsulosin (FLOMAX) capsule 0.8 mg  0.8 mg Oral DAILY  allopurinol (ZYLOPRIM) tablet 100 mg  100 mg Oral DAILY REVIEW OF SYSTEMS Negative except as stated in the HPI. Physical Exam:  
Visit Vitals BP (!) 117/97 (BP 1 Location: Right arm, BP Patient Position: At rest) Pulse 93 Temp 98.3 °F (36.8 °C) Resp 10 Ht 6' 2\" (1.88 m) Wt 85.7 kg (188 lb 15 oz) SpO2 95% BMI 24.26 kg/m² General:  Alert, cooperative, no distress, appears stated age. Head:  Normocephalic, without obvious abnormality, atraumatic. Eyes:  Conjunctivae/corneas clear. PERRL, EOMs intact. Nose: Nares normal.  
Throat: Lips, mucosa, and tongue normal.  
Neck: Supple, symmetrical, trachea midline, no adenopathy. Lungs:   Clear to auscultation bilaterally.   
   
Heart:  Regular rate and rhythm, S1, S2 normal, no murmur, click, rub or gallop. Abdomen:   Soft, non-tender. Bowel sounds normal.  
Extremities: Extremities normal.  
Pulses: 2+ and symmetric all extremities. Skin: Skin color, texture, turgor normal. No rashes or lesions Neurologic: Grossly nonfocal  
 
 
Lab Results Component Value Date/Time Sodium 135 (L) 11/07/2019 04:04 AM  
 Potassium 3.6 11/07/2019 04:04 AM  
 Chloride 98 11/07/2019 04:04 AM  
 CO2 33 (H) 11/07/2019 04:04 AM  
 BUN 18 11/07/2019 04:04 AM  
 Creatinine 1.19 11/07/2019 04:04 AM  
 Glucose 90 11/07/2019 04:04 AM  
 Calcium 8.7 11/07/2019 04:04 AM  
 Magnesium 2.3 11/07/2019 04:04 AM  
 Phosphorus 2.4 (L) 06/04/2019 04:09 AM  
 Lactic acid 1.0 11/07/2019 04:04 AM  
 
 
Lab Results Component Value Date/Time WBC 4.6 11/07/2019 04:04 AM  
 HGB 7.8 (L) 11/07/2019 04:04 AM  
 PLATELET 90 (L) 18/66/0750 04:04 AM  
 MCV 94.4 11/07/2019 04:04 AM  
 
 
Lab Results Component Value Date/Time INR 1.1 11/07/2019 04:04 AM  
 aPTT 26.5 11/07/2019 04:04 AM  
 AST (SGOT) 40 (H) 11/07/2019 04:04 AM  
 Alk. phosphatase 137 (H) 11/07/2019 04:04 AM  
 Protein, total 5.5 (L) 11/07/2019 04:04 AM  
 Albumin 2.4 (L) 11/07/2019 04:04 AM  
 Globulin 3.1 11/07/2019 04:04 AM  
 
 
Lab Results Component Value Date/Time Iron 74 11/03/2019 03:32 AM  
 TIBC 176 (L) 11/03/2019 03:32 AM  
 Iron % saturation 42 11/03/2019 03:32 AM  
 Ferritin 1,079 (H) 11/03/2019 03:32 AM  
 
 
Lab Results Component Value Date/Time TSH 2.40 10/25/2019 07:39 PM  
  
 
Lab Results Component Value Date/Time PHI 7.453 (H) 11/06/2019 09:56 AM  
 PCO2I 38.4 11/06/2019 09:56 AM  
 PO2I 86 11/06/2019 09:56 AM  
 HCO3I 26.9 (H) 11/06/2019 09:56 AM  
 FIO2I 21 11/06/2019 09:56 AM  
 
 
Lab Results Component Value Date/Time CK 51 10/25/2019 02:56 PM  
 Troponin-I, Qt. <0.05 10/25/2019 02:56 PM  
  
 
Lab Results Component Value Date/Time  Culture result: SERRATIA MARCESCENS (A) 10/31/2019 10:05 AM  
 Culture result: SERRATIA MARCESCENS (2ND COLONY TYPE/STRAIN) (A) 10/31/2019 10:05 AM  
 Culture result: ENTEROCOCCUS FAECALIS GROUP D (A) 10/31/2019 10:05 AM  
 
 
Lab Results Component Value Date/Time Hepatitis B surface Ag <0.10 06/13/2019 11:40 AM  
 Hepatitis B surface Ag <0.10 06/13/2019 11:40 AM  
 
 
Lab Results Component Value Date/Time CK 51 10/25/2019 02:56 PM  
 
 
Lab Results Component Value Date/Time Color DARK YELLOW 10/31/2019 11:00 AM  
 Appearance CLEAR 10/31/2019 11:00 AM  
 Specific gravity 1.015 10/31/2019 11:00 AM  
 pH (UA) 6.0 10/31/2019 11:00 AM  
 Protein >300 (A) 10/31/2019 11:00 AM  
 Glucose 100 (A) 10/31/2019 11:00 AM  
 Ketone NEGATIVE  10/31/2019 11:00 AM  
 Bilirubin NEGATIVE  10/25/2019 05:30 PM  
 Blood LARGE (A) 10/31/2019 11:00 AM  
 Urobilinogen 2.0 (H) 10/31/2019 11:00 AM  
 Nitrites NEGATIVE  10/31/2019 11:00 AM  
 Leukocyte Esterase TRACE (A) 10/31/2019 11:00 AM  
 WBC 10-20 10/31/2019 11:00 AM  
 RBC >100 (H) 10/31/2019 11:00 AM  
 Bacteria NEGATIVE  10/31/2019 11:00 AM  
 
 
IMPRESSION: 
========== 
-Mediastinal lymphadenopathy. Noted back to 5/31/19 CT scan. Suspect related to HF, but was former smoker  
-CAD - s/p VFib cardiac arrest s/p CABG 2011 @ [address] - Dr Steven Luo. 
-Ischemic cardiomyopathy with EF 15% - s/p impella P8  
-Afib s/p DCCV. -S/p BiVICD 6/21/2019. 
-JOSE. -Hx of DVT. -Nicotine dependence. 
-restrictive pattern on spirometry: suspect multifactorial due to HF, inability to take an effective deep breath, and possibly adenopathy. Will repeat ron today PLAN: 
==== 
-History of sternal wound infection in 2011 and subsequently sternum was removed. -differential diagnoses - reactive > sarcoid < malignancy. 
-Tough case and will need general anesthesia for EBUS.  High risk for decompensation with GA. 
- med LAD is mild and non specific- risks( cardiac issues/hypxia from CHF)  outweigh benefits of GA- EBUS- agree with PET/CT at some point in near future when out of CHF and and on impella. - nothing from a pulmonary aspec excludes LVAD consideration Shanelle Finch PA-C

## 2019-11-07 NOTE — PROGRESS NOTES
[place] [id number] [facility], [address] GI PROGRESS NOTE Maday Lisa, [address] office [phone] NP/PA in-hospital cell phone M-F until 4:30PM 
After 5PM or on weekends, please call  for physician on call NAME: Marissa Villalba :  1950 MRN:  [id number] Subjective: He is feeling well, no complaints. Up in chair, wife at bedside. Objective: VITALS:  
Last 24hrs VS reviewed since prior progress note. Most recent are: 
Visit Vitals BP 90/72 Pulse 76 Temp 97.6 °F (36.4 °C) Resp 21 Ht 6' 2\" (1.88 m) Wt 85.7 kg (188 lb 15 oz) SpO2 97% BMI 24.26 kg/m² PHYSICAL EXAM: 
General:          Cooperative, no acute distress   
Neurologic:      Alert and oriented HEENT:           EOMI, no scleral icterus Lungs:             Diminished bilaterally anteriorly Heart:              S1 S2 Abdomen:        Soft, non-distended, no tenderness, no guarding, no rebound. +Bowel sounds. Extremities:     Warm Psych:             Not anxious or agitated Lab Data Reviewed:  
 
Recent Results (from the past 24 hour(s)) ECHO ADULT COMPLETE Collection Time: 19  3:00 PM  
Result Value Ref Range LA Volume 106.99 18 - 58 mL  
 LV E' Lateral Velocity 7.91 cm/s LV E' Septal Velocity 3.62 cm/s Tapse 1.40 (A) 1.5 - 2.0 cm LVIDd 7.36 (A) 4.2 - 5.9 cm  
 LVPWd 1.05 (A) 0.6 - 1.0 cm LVIDs 6.76 cm IVSd 1.01 (A) 0.6 - 1.0 cm  
 MVA (PHT) 3.5 cm2  
 MV A Isaac 29.53 cm/s  
 MV E Isaac 95.32 cm/s  
 MV E/A 3.20 RVIDd 5.40 cm LA Vol 4C 104.55 (A) 18 - 58 mL  
 LA Vol 2C 89.85 (A) 18 - 58 mL  
 LA Area 4C 29.8 cm2 LV Mass .3 (A) 88 - 224 g LV Mass AL Index 210.8 49 - 115 g/m2 E/E' lateral 12.05   
 E/E' septal 26.33   
 RVSP 60.9 mmHg E/E' ratio (averaged) 19.19 Est. RA Pressure 10.0 mmHg Mitral Regurgitant Peak Velocity 410.81 cm/s Mitral Valve E Wave Deceleration Time 214.2 ms Mitral Valve Pressure Half-time 62.1 ms  
 Triscuspid Valve Regurgitation Peak Gradient 50.9 mmHg Pulmonic Valve Max Velocity 47.81 cm/s  
 TR Max Velocity 356.73 cm/s  
 LA Vol Index 51.10 16 - 28 ml/m2 PASP 60.9 mmHg LA Vol Index 42.92 16 - 28 ml/m2 LA Vol Index 49.94 16 - 28 ml/m2 MR Peak Gradient 67.5 mmHg Left Ventricular Fractional Shortening by 2D [id number] % PV End Diastolic Velocity 1.2 mmHg Mitral Valve Deceleration [place] [id number] Pulmonary Artery Mean Presure 30.5 mmHg PI End Diastolic Pressure 53.1 mmHg PV peak gradient 0.9 mmHg GLUCOSE, POC Collection Time: 11/06/19  4:29 PM  
Result Value Ref Range Glucose (POC) 104 (H) 65 - 100 mg/dL Performed by Angle Alegre, POC Collection Time: 11/06/19  9:50 PM  
Result Value Ref Range Glucose (POC) 119 (H) 65 - 100 mg/dL Performed by Castillo Mejía LD Collection Time: 11/07/19  4:04 AM  
Result Value Ref Range  (H) 85 - 241 U/L  
NT-PRO BNP Collection Time: 11/07/19  4:04 AM  
Result Value Ref Range NT pro-BNP 6,654 (H) <125 PG/ML  
LACTIC ACID Collection Time: 11/07/19  4:04 AM  
Result Value Ref Range Lactic acid 1.0 0.4 - 2.0 MMOL/L  
CBC W/O DIFF Collection Time: 11/07/19  4:04 AM  
Result Value Ref Range WBC 4.6 4.1 - 11.1 K/uL  
 RBC 2.69 (L) 4.10 - 5.70 M/uL HGB 7.8 (L) 12.1 - 17.0 g/dL HCT 25.4 (L) 36.6 - 50.3 % MCV 94.4 80.0 - 99.0 FL  
 MCH 29.0 26.0 - 34.0 PG  
 MCHC 30.7 30.0 - 36.5 g/dL  
 RDW 19.7 (H) 11.5 - 14.5 % PLATELET 90 (L) 347 - 400 K/uL MPV 10.0 8.9 - 12.9 FL  
 NRBC 0.0 0  WBC ABSOLUTE NRBC 0.00 0.00 - 0.01 K/uL METABOLIC PANEL, COMPREHENSIVE Collection Time: 11/07/19  4:04 AM  
Result Value Ref Range Sodium 135 (L) 136 - 145 mmol/L Potassium 3.6 3.5 - 5.1 mmol/L  Chloride 98 97 - 108 mmol/L  
 CO2 33 (H) 21 - 32 mmol/L  
 Anion gap 4 (L) 5 - 15 mmol/L Glucose 90 65 - 100 mg/dL BUN 18 6 - 20 MG/DL Creatinine 1.19 0.70 - 1.30 MG/DL  
 BUN/Creatinine ratio 15 12 - 20 GFR est AA >60 >60 ml/min/1.73m2 GFR est non-AA >60 >60 ml/min/1.73m2 Calcium 8.7 8.5 - 10.1 MG/DL Bilirubin, total 1.5 (H) 0.2 - 1.0 MG/DL  
 ALT (SGPT) 30 12 - 78 U/L  
 AST (SGOT) 40 (H) 15 - 37 U/L Alk. phosphatase 137 (H) 45 - 117 U/L Protein, total 5.5 (L) 6.4 - 8.2 g/dL Albumin 2.4 (L) 3.5 - 5.0 g/dL Globulin 3.1 2.0 - 4.0 g/dL A-G Ratio 0.8 (L) 1.1 - 2.2 PROTHROMBIN TIME + INR Collection Time: 11/07/19  4:04 AM  
Result Value Ref Range INR 1.1 0.9 - 1.1 Prothrombin time 11.5 (H) 9.0 - 11.1 sec PTT Collection Time: 11/07/19  4:04 AM  
Result Value Ref Range aPTT 26.5 22.1 - 32.0 sec  
 aPTT, therapeutic range     58.0 - 77.0 SECS  
MAGNESIUM Collection Time: 11/07/19  4:04 AM  
Result Value Ref Range Magnesium 2.3 1.6 - 2.4 mg/dL PROCALCITONIN Collection Time: 11/07/19  4:04 AM  
Result Value Ref Range Procalcitonin 0.1 ng/mL TYPE & SCREEN Collection Time: 11/07/19  4:04 AM  
Result Value Ref Range Crossmatch Expiration 11/10/2019 ABO/Rh(D) O POSITIVE Antibody screen POS Antibody ID NO ADDITIONAL ANTIBODIES DETECTED Comment Previously identified Nonspecific Antibody and Nonspecific Cold Antibody Unit number [id number] Blood component type RC LR Unit division 00 Status of unit ISSUED Crossmatch result Compatible GLUCOSE, POC Collection Time: 11/07/19  6:13 AM  
Result Value Ref Range Glucose (POC) 135 (H) 65 - 100 mg/dL Performed by Cheryl [place] GLUCOSE, POC Collection Time: 11/07/19 12:22 PM  
Result Value Ref Range Glucose (POC) 105 (H) 65 - 100 mg/dL Performed by Yessy Zepeda Assessment:  
· LVAD evaluation: Impella in place. · Iron deficiency anemia: Hgb 7.8, platelets 90. Status post IV iron. No PRBC transfusions. Bivalirudin on hold. No signs of active GI bleeding. · Thrombocytopenia: status post 1 unit platelets 80/65/25. Platelets 90 - acceptable for procedures. · History of colon polyps: Colonoscopy in 2009 with reported tubular adenomas. · Acute on chronic systolic heart failure · History of VF arrest status post AICD · Coronary artery disease status post CABG in 2010 · Atrial fibrillation · Acute kidney injury on chronic kidney disease · Urinary tract infection/hematuria: ID following. On Cefepime. Patient Active Problem List  
Diagnosis Code  Paroxysmal atrial fibrillation (HCC) I48.0  Acute on chronic systolic CHF (congestive heart failure) (HCC) I50.23  Systolic CHF, chronic (HCC) I50.22  
 Peripheral vascular disease (HCC) I73.9  Acute decompensated heart failure (HCC) I50.9 Plan: · Monitor CBC. No signs of active GI bleeding. · Discussed with Cheyenne Jim, will aim for EGD and colonoscopy Monday at 12:30 at the bedside, no anticoagulation, Cheyenne Jim to request anesthesia Signed By: Davion Tellez NP   
 11/7/2019  10:07 AM  
    
I have examined the patient. I have reviewed the chart and agree with the documentation recorded by the NP, including the assessment, treatment plan, and disposition.  
 
 
 
Luz Gordon MD

## 2019-11-07 NOTE — PROGRESS NOTES
[address] [facility] [address] [facility] in [place], [place] Inpatient Progress Note Patient name: Matt Bailey Patient : 1950 Patient MRN: [id number] Attending MD: Lamin Del Valle MD 
Date of service: 19 CHIEF COMPLAINT: 
Cardiogenic shock 
  
PLAN: 
Continue current impella speed at P8; cannot tolerate lower speeds Repeat TTE today Continue bumex 1mg IV every 6 hours for CVP > 10; renal consult appreciated Continue CAROL hose to mobilize LE fluid Transduce CVP off PICC Needs manual MAPs once daily in AM (automated cuffs consistently erroneous) No ACE/ARB/ARNi in anticipation of surgery No MAR due to hyponatremia No tolvaptan due to hepatic dysfuction Continue small dose amiodarone for PAF Anemia and thrombocytopenia likely due to hematuria and hemolysis 1 unit PRBCs today Urinary retention and Serratia UTI with hematuria, on antibiotics by ID - ?length of therapy - need to determine timing of LVAD implant Urology consult appreciated Continuous bladder irrigation Agree with consideration of cystoscopy and fulguration Anticoagulation and ASA on hold due to hematuria and epistaxis Cannot use octeotride or doxycycline for epistaxis/AVM prophylaxis due to QTc > 580ms Nutritionist consult; okay to bring food from home; working on nutrition, prealbumins weekly, continue Marinol and ordered 6 small meals daily Pulmonary consult appreciated - repeat chest CT showing again mild lymphadenopathy, no mass/nodules; has emphysema and will need 6MW to r/o exercise induced hypoxia; won't be able to get DLCO on impella PFTs unchanged post bronchodilator therapy Request GOLD classification and related therapies Awaiting remainder of the VAD workup Colonoscopy, EGD scheduled for Monday at 12:30 at the bedside Delay [facility] until UTI clears and GI evaluation completed to r/o CA Not be able to have PET scan due to dextrose required for Impella infusion Patient and family understand it will be at least one month that he will be ready for another surgery Ongoing PT/OT/VAD education  
  
IMPRESSION: 
Cardiogenic shock S/p Impella 5.0 implant 10/29/19 by Dr. Mario Alberto Stringer Acute on chronic systolic heart failure Stage D, NYHA class IV symptoms Non-ischemic cardiomyopathy, LVEF 15%, LVEDD 5.7cm Hyponatremia Liver dysfunction Thrombocytopenia H/o VF arrest s/p ICD 
CAD s/p CABG 2010 C/b sternal wound infection requiring sternectomy CKD PAF s/p DCCV 61/8/19 UTI with GNR Anemia, iron deficiency Cardiac cachexia Vocal cord paralysis Epistaxis Hematuria 
  
CARDIAC IMAGING: 
Tagged WBC negative 
  INTERVAL HISTORY: 
Feels well today Hgb 7.8 from 8.0 
CBI continues MAPS at goal 
CVP around 6 
PLT 90 from 84 PHYSICAL EXAM: 
Visit Vitals BP (!) 84/72 Pulse 83 Temp 98.6 °F (37 °C) Resp 18 Ht 6' 2\" (1.88 m) Wt 188 lb 15 oz (85.7 kg) SpO2 97% BMI 24.26 kg/m² Impella (5.0) Performance Level (P Level): 8 Flow Rate L/min (0-2.5): 4.3 L/min Placement Signal (mmHg): Differential 5.0 (s/d 30-60/0 ex) Placement Signal (Systolic/Diastolic): 14/8 Motor Current (amp): (normal) Motor Current (Systolic/Diastolic): 529/369 Placement Monitoring: OK Purge Pressure (300-1100 mm Hg): 354 Purge Flow (ml/hr): 16.3 Sidearm Pressure Bag @ 300 mmHg/ flushed (Na with 5.0 Impella): No 
Power (AC/Battery): Yes Impella Pump Serial Number: [id number] Hemodynamics: 
 CVP: CVP (mmHg): 9 mmHg (11/07/19 0900) General: Patient is well developed, cachectic, well-nourished in no acute distress HEENT: Normocephalic and atraumatic. No scleral icterus. Pupils are equal, round and reactive to light and accomodation. No conjunctival injection. Oropharynx is clear. Neck: Supple. No evidence of thyroid enlargements or lymphadenopathy. JVD: Cannot be appreciated Lungs: Breath sounds are equal and clear bilaterally. No wheezes, rhonchi, or rales. Heart: Regular rate and rhythm with normal S1 and S2. No murmurs, gallops or rubs. Abdomen: Soft, no mass or tenderness. No organomegaly or hernia. Bowel sounds present. Genitourinary and rectal: neal in place, gross hematuria noted. Extremities: No cyanosis, clubbing. 3+ LE pitting edema. Neurologic: No focal sensory or motor deficits are noted. Grossly intact. Psychiatric: Awake, alert and oriented x 3. Appropriate mood and affect. Skin: Warm, dry and well perfused. Multiple areas of ecchymosis on his arms. REVIEW OF SYSTEMS: 
General: Denies fever, night sweats. Ear, nose and throat: Denies difficulty hearing, sinus problems, runny nose, post-nasal drip, ringing in ears, mouth sores, loose teeth, ear pain, nosebleeds, sore throate, facial pain or numbess Cardiovascular: see above in the interval history Respiratory: Denies cough, wheezing, sputum production, hemoptysis. Gastrointestinal: Denies heartburn, constipation, intolerance to certain foods, diarrhea, abdominal pain, nausea, vomiting, difficulty swallowing, blood in stool Kidney and bladder: Denies painful urination, frequent urination, urgency, prostate problems and impotence Musculoskeletal: Denies joint pain, muscle weakness Skin and hair: Denies change in existing skin lesions, hair loss or increase PAST MEDICAL HISTORY: 
Past Medical History:  
Diagnosis Date  Degenerative disc disease, lumbar  Heart failure ([address]  High cholesterol  Hypertension  Paroxysmal atrial fibrillation ([address] 4/2/2019  Spinal stenosis PAST SURGICAL HISTORY: 
Past Surgical History:  
Procedure Laterality Date  HX APPENDECTOMY  HX CORONARY ARTERY BYPASS GRAFT    
 triple  HX HERNIA REPAIR    
 HX IMPLANTABLE CARDIOVERTER DEFIBRILLATOR  TX CARDIOVERSION ELECTIVE ARRHYTHMIA EXTERNAL N/A 6/10/2019  EP CARDIOVERSION [place] Rent[place] [address], [facility]  CATH LAB  
  CO CARDIOVERSION ELECTIVE ARRHYTHMIA EXTERNAL N/A 2019 EP CARDIOVERSION performed by Carlos Browne MD at [address] CATH LAB  CO INSJ/RPLCMT PERM DFB W/TRNSVNS LDS 1/DUAL CHMBR N/A 2019 INSERT ICD BIV MULTI performed by Jose Myles MD at [address] CATH LAB  CO TCAT IMPL WRLS P-ART PRS SNR L-T HEMODYN MNTR N/A 2019 IMPLANT HEART FAILURE MONITORING DEVICE performed by Laurel Brown MD at [address] CATH LAB FAMILY HISTORY: 
Family History Problem Relation Age of Onset  Lung Disease Mother  Hypertension Mother Canseco Arthritis-osteo Mother  Heart Disease Mother  Heart Disease Father  Diabetes Father SOCIAL HISTORY: 
Social History Socioeconomic History  Marital status:  Spouse name: Not on file  Number of children: Not on file  Years of education: Not on file  Highest education level: Not on file Tobacco Use  Smoking status: Former Smoker Last attempt to quit: 2010 Years since quittin.9  Smokeless tobacco: Never Used Substance and Sexual Activity  Alcohol use: Not Currently Comment: rarely  Drug use: Never Other Topics Concern LABORATORY RESULTS: 
  
Labs Latest Ref Rng & Units 2019 2019 2019 2019 11/3/2019 2019 2019 WBC 4.1 - 11.1 K/uL 4.6 4.9 5.3 5.5 4.9 - 4.6  
RBC 4.10 - 5.70 M/uL 2.69(L) 2.76(L) 2.83(L) 3.03(L) 2.92(L) - 3.16(L) Hemoglobin 12.1 - 17.0 g/dL 7. 8(L) 8.0(L) 8. 3(L) 8.8(L) 8.4(L) 9.3(L) 9.1(L) Hematocrit 36.6 - 50.3 % 25. 4(L) 26. 0(L) 26. 4(L) 28. 1(L) 27. 1(L) 29. 7(L) 28. 7(L) MCV 80.0 - 99.0 FL 94.4 94.2 93.3 92.7 92.8 - 90.8 Platelets 955 - 687 K/uL 90(L) 84(L) 80(L) 86(L) 81(L) - 91(L) Lymphocytes 12 - 49 % - - - - - - - Monocytes 5 - 13 % - - - - - - - Eosinophils 0 - 7 % - - - - - - - Basophils 0 - 1 % - - - - - - - Albumin 3.5 - 5.0 g/dL 2. 4(L) 2. 3(L) 2. 4(L) 2. 6(L) 2. 7(L) - 2. 6(L) Calcium 8.5 - 10.1 MG/DL 8.7 8.5 8.5 8.7 8.4(L) - 8. 4(L) SGOT 15 - 37 U/L 40(H) 42(H) 50(H) 50(H) 55(H) - 46(H) Glucose 65 - 100 mg/dL 90 83 90 92 87 - 96 BUN 6 - 20 MG/DL 18 18 25(H) 25(H) 28(H) - 32(H) Creatinine 0.70 - 1.30 MG/DL 1.19 1.03 1.07 1.19 1.16 - 1.22 Sodium 136 - 145 mmol/L 135(L) 132(L) 128(L) 130(L) 132(L) - 131(L) Potassium 3.5 - 5.1 mmol/L 3.6 3.9 4.3 4.2 3.9 - 3.9 TSH 0.36 - 3.74 uIU/mL - - - - - - -  
PSA 0.01 - 4.0 ng/mL - - - - - - -  
LDH 85 - 241 U/L 458(H) 436(H) 482(H) 458(H) 435(H) - 446(H) Some recent data might be hidden Lab Results Component Value Date/Time TSH 2.40 10/25/2019 07:39 PM  
 TSH 2.45 06/01/2019 04:16 AM  
 
 
ALLERGY: 
No Known Allergies CURRENT MEDICATIONS: 
 
Current Facility-Administered Medications:  
  0.9% sodium chloride infusion 250 mL, 250 mL, IntraVENous, PRN, Mavis Carrizales MD 
  amiodarone (CORDARONE) tablet 100 mg, 100 mg, Oral, DAILY, Madeleine Herrera NP, 100 mg at 11/07/19 6966   dronabinol (MARINOL) capsule 2.5 mg, 2.5 mg, Oral, ACB&D, Madeleine Herrera NP, 2.5 mg at 11/07/19 7391   piperacillin-tazobactam (ZOSYN) 3.375 g in 0.9% sodium chloride (MBP/ADV) 100 mL, 3.375 g, IntraVENous, Q8H, Myrna Pan MD, Last Rate: 25 mL/hr at 11/07/19 0906, 3.375 g at 11/07/19 6029   insulin lispro (HUMALOG) injection, , SubCUTAneous, AC&HS, Yue Dalal MD, Stopped at 11/06/19 1630   bumetanide (BUMEX) injection 1 mg, 1 mg, IntraVENous, Q6H PRN, Mavis Carrizales MD, 1 mg at 11/06/19 2254   fluticasone propionate (FLONASE) 50 mcg/actuation nasal spray 2 Spray, 2 Spray, Both Nostrils, DAILY, Mavis Carrizales MD, 2 [place] at 11/04/19 0849 
  sodium chloride (OCEAN) 0.65 % nasal squeeze bottle 2 Spray, 2 Spray, Both Nostrils, QID, Mavis Carrizales MD, 2 [place] at 11/05/19 2107   alteplase (CATHFLO) 1 mg in dextrose 5% 50 mL impella purge solution, 1 mg, Other, TITRATE, Gene Khalil MD, Last Rate: 0 mL/hr at 11/03/19 0937, 1 mg at 11/03/19 2043   epoetin yvonne-epbx (RETACRIT) injection 10,000 Units, 10,000 Units, SubCUTAneous, Q TUE, THU & SAT, Shanice Rasmussen MD, 10,000 Units at 11/05/19 2107   pantoprazole (PROTONIX) tablet 40 mg, 40 mg, Oral, ACB, Pablito DIOR, NP, 40 mg at 11/07/19 2734 
  dextrose 5% infusion, 4-20 mL/hr, IntraVENous, CONTINUOUS, Pablito DIOR, NP, Last Rate: 17.1 mL/hr at 11/04/19 0421, 17.1 mL/hr at 11/04/19 0421   bivalirudin (ANGIOMAX) 250 mg in dextrose 5% 250 mL infusion, 4-20 mL/hr, Other, TITRATE, Zachary Rondon NP, Stopped at 11/01/19 2210 
  alteplase (CATHFLO) 1 mg in sterile water (preservative free) 1 mL injection, 1 mg, InterCATHeter, PRN, Pablito DIOR, NP, 1 mg at 11/03/19 1344   bacitracin 500 unit/gram packet 1 Packet, 1 Packet, Topical, PRN, Zachary Rondon NP 
  sodium chloride (NS) flush 5-40 mL, 5-40 mL, IntraVENous, Q8H, Pablito DIOR, NP, 10 mL at 11/07/19 0505   sodium chloride (NS) flush 5-40 mL, 5-40 mL, IntraVENous, PRN, Zachary Rondon NP 
  0.45% sodium chloride infusion, 10 mL/hr, IntraVENous, CONTINUOUS, Pablito DIOR, NP, Last Rate: 10 mL/hr at 10/29/19 1321, 10 mL/hr at 10/29/19 1321 
  0.9% sodium chloride infusion, 10 mL/hr, IntraVENous, CONTINUOUS, Shana Sims NP, Last Rate: 10 mL/hr at 10/31/19 1300, 10 mL/hr at 10/31/19 1300 
  oxyCODONE IR (ROXICODONE) tablet 5 mg, 5 mg, Oral, Q4H PRN, Pablito DIOR, NP, 5 mg at 11/06/19 0929 
  oxyCODONE IR (ROXICODONE) tablet 10 mg, 10 mg, Oral, Q4H PRN, Zachary Rondon NP, 10 mg at 11/06/19 2254   morphine injection 4 mg, 4 mg, IntraVENous, Q2H PRN, Zachary Rondon NP 
  naloxone [facility]) injection 0.4 mg, 0.4 mg, IntraVENous, PRN, Zachary Rondon NP 
  ondansetron [facility]) injection 4 mg, 4 mg, IntraVENous, Q4H PRN, Pablito DIOR NP, 4 mg at 10/31/19 7997   albuterol (PROVENTIL VENTOLIN) nebulizer solution 2.5 mg, 2.5 mg, Nebulization, Q4H PRN, Little DIOR, TIERRA, 2.5 mg at 11/06/19 0350   chlorhexidine (PERIDEX) 0.12 % mouthwash 10 mL, 10 mL, Oral, Q12H, Little DIOR, NP, 10 mL at 11/07/19 0921 
  magnesium oxide (MAG-OX) tablet 400 mg, 400 mg, Oral, BID, Little DIOR, NP, 400 mg at 11/07/19 0463   calcium chloride 1 g in 0.9% sodium chloride 250 mL IVPB, 1 g, IntraVENous, PRN, Stu Cohen NP 
  bisacodyl (DULCOLAX) suppository 10 mg, 10 mg, Rectal, DAILY PRN, Stu Cohen NP 
  senna-docusate (PERICOLACE) 8.6-50 mg per tablet 1 Tab, 1 Tab, Oral, BID, Stu Cohen NP, 1 Tab at 11/07/19 0456   polyethylene glycol (MIRALAX) packet 17 g, 17 g, Oral, DAILY, Little DIOR NP, 17 g at 11/06/19 0900   ELECTROLYTE REPLACEMENT NOTE: Nurse to review Serum Potassium and Magnesuim levels and Initiate Electrolyte Replacement Protocol as needed, 1 Each, Other, PRN, Stu Cohen NP 
  magnesium sulfate 1 g/100 ml IVPB (premix or compounded), 1 g, IntraVENous, PRN, Stu Cohen NP 
  glucose chewable tablet 16 g, 4 Tab, Oral, PRN, Stu Cohen NP 
  glucagon [facility]) injection 1 mg, 1 mg, IntraMUSCular, PRN, Stu Cohen NP 
  dextrose 10% infusion 0-250 mL, 0-250 mL, IntraVENous, PRN, Stu Cohen NP 
  morphine injection 2 mg, 2 mg, IntraVENous, Q2H PRN, Stu Cohen NP 
  melatonin tablet 3 mg, 3 mg, Oral, QHS PRN, Stu Cohen NP, 3 mg at 10/30/19 2056   albumin human 5% (BUMINATE) solution 25 g, 25 g, IntraVENous, Q2H PRN, Amauri Wagner MD, 25 g at 10/30/19 3054   influenza vaccine 2019-20 (6 mos+)(PF) (FLUARIX/FLULAVAL/FLUZONE QUAD) injection 0.5 mL, 0.5 mL, IntraMUSCular, PRIOR TO DISCHARGE, Stu Cohen NP 
  [Held by provider] aspirin delayed-release tablet 81 mg, 81 mg, Oral, DAILY, Little DIOR NP, 81 mg at 10/31/19 2289   tamsulosin (FLOMAX) capsule 0.8 mg, 0.8 mg, Oral, DAILY, Verner Carbine D, NP, 0.8 mg at 11/07/19 4421   allopurinol (ZYLOPRIM) tablet 100 mg, 100 mg, Oral, DAILY, Verner Carbine D, NP, 100 mg at 11/07/19 2019 Thank you for allowing me to participate in this patient's care. John Lopez, AGACNP-BC [facility] [address], [address] Phone: [phone] Fax: [phone] [facility] ATTENDING ADDENDUM Patient was seen and examined in person. Data and notes were reviewed. I have discussed and agree with the plan as noted in the NP note above without further additions. Chato Ogden MD PhD 
[facility] [address] [place] [address]

## 2019-11-07 NOTE — PROGRESS NOTES
[facility] ICU Progress Note Admit Date: 10/25/2019 POD:  7 Day Post-Op Procedure:  Procedure(s): RIGHT AXILLARY IMPELLA INSERTION Subjective:  
Pt seen with Dr. Vidal Mcnally. On room air, sitting in the chair. Afebrile. Impella P8, D5 purge. Lizama in [address] [place] [address] per Urology. Objective:  
Vitals: 
Blood pressure 94/81, pulse 77, temperature 98.6 °F (37 °C), resp. rate 17, height 6' 2\" (1.88 m), weight 188 lb 15 oz (85.7 kg), SpO2 100 %. Temp (24hrs), Av °F (36.7 °C), Min:97.6 °F (36.4 °C), Max:98.6 °F (37 °C) Hemodynamics: 
 CO: CO (l/min): 8.3 l/min CI: CI (l/min/m2): 4 l/min/m2 CVP: CVP (mmHg): 9 mmHg (19 1500) SVR: SVR (dyne*sec)/cm5: 781 (dyne*sec)/cm5 (19 1500) PAP Systolic: PAP Systolic: 45 ( 7792) PAP Diastolic: PAP Diastolic: 19 ( 2949) PVR:   
 SV02: SVO2 (%): 51 % (19 1500) SCV02: SCVO2 (%): 75 % (10/29/19 1900) EKG/Rhythm:   
Paced in the 76s Oxygen Therapy: 
Oxygen Therapy O2 Sat (%): 100 % (19 1500) Pulse via Oximetry: 83 beats per minute (19 1500) O2 Device: Room air (19 1200) O2 Flow Rate (L/min): 2 l/min (19 0000) FIO2 (%): 40 % (10/30/19 0846) CXR:  
CXR Results  (Last 48 hours) 19 0519  XR CHEST PORT Final result Impression:  IMPRESSION:  
1. The lungs are clear. Narrative:  EXAM: XR CHEST PORT INDICATION: postop heart surgery, heart failure COMPARISON: 2019 FINDINGS: A portable AP radiograph of the chest was obtained at 0431 hours. The  
patient is on a cardiac monitor. The mild cardiomegaly is unchanged. The PICC  
line overlies the superior vena cava. Cardiac assist device is in satisfactory  
position. ICD is in place. Small metallic monitoring device projects over the  
region of the left lower lobe pulmonary artery The lungs are well aerated. There is no pneumonia or significant atelectasis. No  
significant pulmonary edema. 11/06/19 0516  XR CHEST PORT Final result Impression:  IMPRESSION: No significant change. Narrative:  INDICATION:  postop heart IMPELLA. Heart failure. EXAM: CXR Portable. FINDINGS: Portable chest shows support lines/devices show no significant change  
since yesterday. There is no apparent pneumothorax. Lungs show emphysema  
without consolidation. Heart size is top normal. There is prior CABG. There is  
no overt pulmonary edema. Admission Weight: Last Weight Weight: 192 lb 10.9 oz (87.4 kg) Weight: 188 lb 15 oz (85.7 kg) Intake / Output / Drain: 
Current Shift: 11/07 0701 - 11/07 1900 In: 3629.3 [I.V.:289.7] Out: 2704 Last 24 hrs.:  
 
Intake/Output Summary (Last 24 hours) at 11/7/2019 1530 Last data filed at 11/7/2019 1400 Gross per 24 hour Intake 20606.9 ml Output 19825 ml Net -3147.1 ml EXAM: 
General:   NAD. Up in the chair Lungs:   Diminished in the bases. Incision:  Impella dressing C/D/I Heart:  Regular rate and rhythm, S1, S2 normal, no murmur, click, rub or gallop. Abdomen:   Soft, non-tender. Bowel sounds hypoactive. No masses,  No organomegaly. Lizama with gross hematuria Extremities:  +1 bilateral lower extremity pitting edema. PPP. Neurologic:  Gross motor and sensory apparatus intact. Labs:  
Recent Labs 11/07/19 
1222  11/07/19 
0404 WBC  --   --  4.6 HGB  --   --  7.8* HCT  --   --  25.4* PLT  --   --  90* NA  --   --  135* K  --   --  3.6 BUN  --   --  18  
CREA  --   --  1.19 GLU  --   --  90  
GLUCPOC 105*   < >  --   
INR  --   --  1.1  
 < > = values in this interval not displayed. Assessment:  
 
Active Problems: 
  Acute decompensated heart failure ([facility] (10/25/2019) Plan/Recommendations/Medical Decision Making: 1. Acute on chronic systolic (CHF Class IV) S/P Impella: Has ICD. LV EF 16-20%. No BB/ACE/ARB/AA until appropriate. Bumex 1 mg IV QID-PRN. Trend proBNP, lactate, LDH. Strict I/Os. Daily weights. LVAD w/u in process. Cefepime changed to zosyn per ID for prophylaxis. Continue D5 purge due to hematuria/epistaxis 2. Thrombocytopenia: Platelets 72E. No asa. HIT negative, LOAN pending. On protonix. Heme following. 3. CAD S/p CABG 2010: Needs LHC, timing TBD, once UTI has cleared. Stop asa d/t hematuria/thrombocytopenia, not on statin historically. No BB D/t pressors/intropes. 4. PAF s/p DCCV 6/2019: Holding eliquis due to hematuria/epistaxis. On PO amio. 5. JUAN J on CKD stage IV: Monitor. Renal following. Diuretics PRN CVP >10. Keep neal. 6. Hx of urinary retention/BPH, hematuria:  On flomax. Neal with CBI per Urology. Urology following. 7. JOSE: qhs CPAP. 8. Hx of sternal wound infection requring sternectomy: Not a contraindication for future LVAD. Tagged WBC -- showed no abnormal tracer uptake. 9. Iron def anemia: s/p venofer. H&H stable today. On Epogen. Cannot use doxy/octreotide d/t prolonged QTC. Occult blood in stool positive per POC testing. Gastroenterology following for LVAD work up. 10. Vocal cord paralysis: Voice improving. Speech eval PRN. Advance diet as tolerated. 11. Constipation: Resolved with BM X 2 11/5. Continue pericolace, miralax(pt keeps refusing). Continue daily suppository PRN. 12. Serratia UTI/hematuria: Urology following and will switch to CBI today. On Zosyn-ID following. 13. Mediastinal lymphadenopathy:  Per AHF, low threshold for Carcinoma per CT scans. Eventually needs PET scan. 14. Hyponatremia: monitor 15. Acute Moderate protein-calorie malnutrition:  Pre-albumin 10/25 was 13.9. Recheck 11/4 was 16.2. Pt eating cardiac diet well. Continue marinol Dispo: Care and orders per AHFS. Remain in CCU. Signed By: Rajeev Urbina NP

## 2019-11-08 NOTE — PROGRESS NOTES
Urology Progress Note Patient: Fiona Lama MRN: [id number]  SSN: [id number] YOB: 1950  Age: 71 y.o. Sex: male ADMITTED: 10/25/2019 to Michelle Garcia MD for Acute decompensated heart failure ([facility] [I50.9] POD# 9 Days Post-Op Procedure(s): RIGHT AXILLARY IMPELLA INSERTION Sleeping comfortably. Urine is clear with slow CBI going. Vitals: Temp (24hrs), Av.2 °F (36.8 °C), Min:97.6 °F (36.4 °C), Max:98.6 °F (37 °C) Blood pressure (!) 114/93, pulse 71, temperature 98.4 °F (36.9 °C), resp. rate 15, height 6' 2\" (1.88 m), weight 85.7 kg (188 lb 15 oz), SpO2 100 %. Intake and Output: 
 0701 -  1900 In: 06986.2 [P.O.:236; I.V.:2435.6] Out: 16047 [Urine:825] Abd - benign. Labs: 
Labs:  
Lab Results Component Value Date/Time WBC 4.6 2019 04:04 AM  
 HGB 8.5 (L) 2019 05:54 PM  
 Creatinine 1.19 2019 04:04 AM  
 
 
 
Assessment/Plan: 
 Hematuria improving. Taper CBI. Hand irrigate PRN. Signed By: Alcira Catalan MD - 2019

## 2019-11-08 NOTE — PROGRESS NOTES
ID Progress Note 
2019 Subjective: No specific complaints today--seems to be doing ok Objective: Antibiotics: 1. Cefepime Vitals:  
Visit Vitals /90 Pulse 85 Temp 98.5 °F (36.9 °C) Resp 12 Ht 6' 2\" (1.88 m) Wt 85.8 kg (189 lb 2.5 oz) SpO2 99% BMI 24.29 kg/m² Tmax:  Temp (24hrs), Av.5 °F (36.9 °C), Min:98.3 °F (36.8 °C), Max:98.6 °F (37 °C) Exam:  Lungs:  clear to auscultation bilaterally Heart:  regular rate and rhythm Abdomen:  soft, non-tender. Bowel sounds normal. No masses,  no organomegaly Grossly bloody urine in catheter Labs:     
Recent Labs 19 
0815 19 
1754 19 
0404 19 
4874 WBC 4.9  --  4.6 4.9 HGB 8.5* 8.5* 7.8* 8.0*  
PLT 97*  --  90* 84* BUN 17  --  18 18 CREA 1.25  --  1.19 1.03  
SGOT 36  --  40* 42* *  --  137* 139* TBILI 1.6*  --  1.5* 1.6* Cultures: No results found for: [facility] Lab Results Component Value Date/Time Culture result: SERRATIA MARCESCENS (A) 10/31/2019 10:05 AM  
 Culture result: SERRATIA MARCESCENS (2ND COLONY TYPE/STRAIN) (A) 10/31/2019 10:05 AM  
 Culture result: ENTEROCOCCUS FAECALIS GROUP D (A) 10/31/2019 10:05 AM  
 
 
Radiology:  
 
Line/Insert Date:        
 
Assessment:  
 
1. UTI--Serratia (2 biotypes) from culture and small amount of Enterococcus 2. CHF/cardiomyopathy Objective: 1. Continue current therapy 2. Group will see over the weekend if asked Reece Gonzalez MD

## 2019-11-08 NOTE — PROGRESS NOTES
Problem: Mobility Impaired (Adult and Pediatric) Goal: *Acute Goals and Plan of Care (Insert Text) Description FUNCTIONAL STATUS PRIOR TO ADMISSION: Patient was modified independent using a single point cane for functional mobility. Patient reports an increasingly sedentary lifestyle 2* fatigue and SOB/dyspnea. Retired (so is his wife). Patient reports x 3 falls within the last couple of weeks. Patient is wearing either nasal cannula or CPAP at night. LVAD work-up has been initiated. HOME SUPPORT PRIOR TO ADMISSION: The patient lived with his wife, but did not require physical assistance. Physical Therapy Goals Initiated 10/27/2019 1. Patient will move from supine to sit and sit to supine, scoot up and down and roll side to side in bed with independence within 7 days. 2.  Patient will perform sit to/from stand with supervision/set-up within 7 days. 3.  Patient will ambulate 150 feet with least restrictive assistive device and supervision/set-up within 7 days. 4.  Patient will ascend/descend 4 stairs with  handrail(s) with supervision/set-up within 7 days for functional strengthening and community reintegration. Nubia Kelly 5.  Patient will verbally and functionally recall 3/3 sternal precautions within 7 days in preparation for LVAD implantation. 6.  Patient will perform a mock power exchange for power module to/from battery with supervision/set-up within 7 days in preparation for LVAD implantation. Outcome: Progressing Towards Goal 
 
PHYSICAL THERAPY TREATMENT: WEEKLY REASSESSMENT Patient: Reid Baumann (75 y.o. male) Date: 11/8/2019 Primary Diagnosis: Acute decompensated heart failure ([facility] [address] [I50.9] Procedure(s) (LRB): 
RIGHT AXILLARY IMPELLA INSERTION (Right) 10 Days Post-Op Precautions:   Fall(R axillary impella) ASSESSMENT Patient continues with skilled PT services and is progressing towards goals.  Patient continues to require constant walker support and upmost minimal assistance for ambulation (progressed to 225 ft; x 3 standing rest breaks; increased difficulty with right turns and environmental distractions/obstacles). Practiced sit <> stand transfers without UE utilization in preparation for \"sternal precautions\" - minimal assist required for last 1/3 of transfer to standing. Impella on P-8 setting. Of note, patient is anticipating LVAD implant 11/18/19. Would like to practice bed mobility via log roll next session. Patient's progression toward goals since last assessment: Slow steady practice, all goals remain appropriate. Patient's functional mobility remains limited by the following: impaired cardiopulmonary tolerance, functional endurance, B LE edema, Impella-dependence, impaired coordination Current Level of Function Impacting Discharge (mobility/balance): Min A + RW support (previously utilizing a SPC) Functional Outcome Measure: The patient scored 11/28 on the Tinetti outcome measure which is indicative of High fall risk. Other factors to consider for discharge: Tentative LVAD 11/18/19 PLAN : 
Goals have been updated based on progression since last assessment. Patient continues to benefit from skilled intervention to address the above impairments. Recommendations and Planned Interventions: bed mobility training, transfer training, gait training, therapeutic exercises, edema management/control, patient and family training/education and therapeutic activities Frequency/Duration: Patient will be followed by physical therapy:  5 times a week to address goals. Recommendation for discharge: (in order for the patient to meet his/her long term goals) To be determined: Following functional performance post-LVAD This discharge recommendation: 
Has been made in collaboration with the attending provider and/or case management IF patient discharges home will need the following DME: to be determined (TBD) SUBJECTIVE:  
 Patient stated Let's do another lap.  - Patient is motivated OBJECTIVE DATA SUMMARY:  
HISTORY:   
Past Medical History:  
Diagnosis Date Degenerative disc disease, lumbar Heart failure ([facility] [address] High cholesterol Hypertension Paroxysmal atrial fibrillation ([facility] [address] 4/2/2019 Spinal stenosis Past Surgical History:  
Procedure Laterality Date HX APPENDECTOMY HX CORONARY ARTERY BYPASS GRAFT    
 triple HX HERNIA REPAIR    
 HX IMPLANTABLE CARDIOVERTER DEFIBRILLATOR    
 [place] CARDIOVERSION ELECTIVE ARRHYTHMIA EXTERNAL N/A 6/10/2019 EP CARDIOVERSION performed by Tammie Russell MD at [address], [facility] CATH LAB  
 [place] CARDIOVERSION ELECTIVE ARRHYTHMIA EXTERNAL N/A 6/18/2019 EP CARDIOVERSION performed by Kimani De Los Santos MD at [address], [address] CATH LAB  
 [place] INSJ/RPLCMT PERM DFB W/TRNSVNS LDS 1/DUAL [facility] N/A 6/21/2019 INSERT ICD BIV MULTI performed by Say Fowler MD at [address] CATH LAB  
 [place] TCAT IMPL WRLS P-ART PRS SNR L-T HEMODYN MNTR N/A 9/18/2019 IMPLANT HEART FAILURE MONITORING DEVICE performed by Kaur Pastrana MD at [address] CATH LAB Personal factors and/or comorbidities impacting plan of care: PMH, LVAD work-up Home Situation Home Environment: Private residence # Steps to Enter: 0 One/Two Story Residence: One story Living Alone: No 
Support Systems: Spouse/Significant Other/Partner Patient Expects to be Discharged to[de-identified] Unknown Current DME Used/Available at Home: Cane, straight, Walker, rolling, CPAP Tub or Shower Type: Shower EXAMINATION/PRESENTATION/DECISION MAKING:  
Critical Behavior: 
Neurologic State: Alert Orientation Level: Oriented X4 Cognition: Appropriate decision making, Appropriate for age attention/concentration, Appropriate safety awareness Safety/Judgement: Awareness of environment, Insight into deficits Hearing: Auditory Auditory Impairment: None Functional Mobility: 
 
Transfers: Sit to Stand: Contact guard assistance;Minimum assistance(Min A without use of UEs) Stand to Sit: Contact guard assistance(Fair eccentric control) Balance:  
Sitting: Intact Standing: Impaired; With support Standing - Static: Good Standing - Dynamic : Fair Ambulation/Gait Training: 
Distance (ft): 225 Feet (ft) Assistive Device: Gait belt;Walker, rolling Ambulation - Level of Assistance: Contact guard assistance;Minimal assistance; Additional time; Adaptive equipment Gait Abnormalities: Decreased step clearance(Intermittent staggering episodes) Functional Measure: 
Tinetti test: 
 
Sitting Balance: 1 Arises: 1 Attempts to Rise: 2 Immediate Standing Balance: 1 Standing Balance: 1 Nudged: 0 Eyes Closed: 0 Turn 360 Degrees - Continuous/Discontinuous: 0 Turn 360 Degrees - Steady/Unsteady: 0 Sitting Down: 1 Balance Score: 7 Balance total score Indication of Gait: 1 
R Step Length/Height: 0 
L Step Length/Height: 0 
R Foot Clearance: 0 
L Foot Clearance: 0 Step Symmetry: 0 Step Continuity: 0 Path: 1 Trunk: 1 Walking Time: 1 Gait Score: 4 Gait total score Total Score: 11/28 Overall total score Tinetti Tool Score Risk of Falls 
<19 = High Fall Risk 19-24 = Moderate Fall Risk 25-28 = Low Fall Risk Tinetti ME. Performance-Oriented Assessment of Mobility Problems in Elderly Patients. [place] 66; [id number]. (Scoring Description: PT Bulletin Feb. 10, 1993) Older adults: Anya Parks et al, 2009; n = 1601 [facility] elderly evaluated with ABC, XAVIER, ADL, and IADL) · Mean XAVIER score for males aged 69-68 years = 26.21(3.40) · Mean XAVIER score for females age 69-68 years = 25.16(4.30) · Mean XAVIER score for males over 80 years = 23.29(6.02) · Mean XAVIER score for females over 80 years = 17.20(8.32) Pain Rating: 
R axillary Impella soreness Activity Tolerance:  
Fair Please refer to the flowsheet for vital signs taken during this treatment. After treatment patient left in no apparent distress:  
Sitting in chair and Call bell within reach, B LEs elevated COMMUNICATION/EDUCATION:  
The patients plan of care was discussed with: Occupational Therapist, Registered Nurse,  and Rehabilitation Attendant. Fall prevention education was provided and the patient/caregiver indicated understanding., Patient/family have participated as able in goal setting and plan of care. and Patient/family agree to work toward stated goals and plan of care. Thank you for this referral. 
Dustin Vega, PT, DPT Time Calculation: 28 mins

## 2019-11-08 NOTE — PROGRESS NOTES
Problem: Falls - Risk of 
Goal: *Absence of Falls Description Document Tia Manus Fall Risk and appropriate interventions in the flowsheet. Outcome: Progressing Towards Goal 
Note:  
Fall Risk Interventions: 
Mobility Interventions: Communicate number of staff needed for ambulation/transfer Mentation Interventions: Door open when patient unattended Medication Interventions: Patient to call before getting OOB Elimination Interventions: Elevated toilet seat History of Falls Interventions: Door open when patient unattended Problem: Patient Education: Go to Patient Education Activity Goal: Patient/Family Education Outcome: Progressing Towards Goal 
  
Problem: Pain Goal: *Control of Pain Outcome: Progressing Towards Goal 
Goal: *PALLIATIVE CARE:  Alleviation of Pain Outcome: Progressing Towards Goal 
  
Problem: Patient Education: Go to Patient Education Activity Goal: Patient/Family Education Outcome: Progressing Towards Goal 
  
Problem: Pressure Injury - Risk of 
Goal: *Prevention of pressure injury Description Document Mich Scale and appropriate interventions in the flowsheet. Outcome: Progressing Towards Goal 
Note:  
Pressure Injury Interventions: 
Sensory Interventions: Keep linens dry and wrinkle-free Moisture Interventions: Maintain skin hydration (lotion/cream) Activity Interventions: Increase time out of bed Mobility Interventions: Pressure redistribution bed/mattress (bed type) Nutrition Interventions: Document food/fluid/supplement intake Friction and Shear Interventions: Minimize layers Problem: Patient Education: Go to Patient Education Activity Goal: Patient/Family Education Outcome: Progressing Towards Goal 
  
Problem: Infection - Risk of, Multi-drug Resistant Organism Colonization (MDRO) Goal: *Absence of MDRO colonization Outcome: Progressing Towards Goal 
Goal: *Absence of infection signs and symptoms Outcome: Progressing Towards Goal 
  
Problem: Patient Education: Go to Patient Education Activity Goal: Patient/Family Education Outcome: Progressing Towards Goal 
  
Problem: Patient Education: Go to Patient Education Activity Goal: Patient/Family Education Outcome: Progressing Towards Goal 
  
Problem: Heart Failure: Day 5 Goal: Off Pathway (Use only if patient is Off Pathway) Outcome: Progressing Towards Goal 
Goal: Activity/Safety Outcome: Progressing Towards Goal 
Goal: Diagnostic Test/Procedures Outcome: Progressing Towards Goal 
Goal: Nutrition/Diet Outcome: Progressing Towards Goal 
Goal: Discharge Planning Outcome: Progressing Towards Goal 
Goal: Medications Outcome: Progressing Towards Goal 
Goal: Respiratory Outcome: Progressing Towards Goal 
Goal: Treatments/Interventions/Procedures Outcome: Progressing Towards Goal 
Goal: Psychosocial 
Outcome: Progressing Towards Goal 
  
Problem: Heart Failure: Discharge Outcomes Goal: *Demonstrates ability to perform prescribed activity without shortness of breath or discomfort Outcome: Progressing Towards Goal 
Goal: *Left ventricular function assessment completed prior to or during stay, or planned for post-discharge Outcome: Progressing Towards Goal 
Goal: *ACEI prescribed if LVEF less than 40% and no contraindications or ARB prescribed Outcome: Progressing Towards Goal 
Goal: *Verbalizes understanding and describes prescribed diet Outcome: Progressing Towards Goal 
Goal: *Verbalizes understanding/describes prescribed medications Outcome: Progressing Towards Goal 
Goal: *Describes available resources and support systems Description 
(eg: Home Health, Palliative Care, Advanced Medical Directive) Outcome: Progressing Towards Goal 
Goal: *Describes smoking cessation resources Outcome: Progressing Towards Goal 
Goal: *Understands and describes signs and symptoms to report to providers(Stroke Metric) Outcome: Progressing Towards Goal 
 Goal: *Describes/verbalizes understanding of follow-up/return appt Description 
(eg: to physicians, diabetes treatment coordinator, and other resources Outcome: Progressing Towards Goal 
Goal: *Describes importance of continuing daily weights and changes to report to physician Outcome: Progressing Towards Goal 
  
Problem: Diabetes Self-Management Goal: *Disease process and treatment process Description Define diabetes and identify own type of diabetes; list 3 options for treating diabetes. Outcome: Progressing Towards Goal 
Goal: *Incorporating nutritional management into lifestyle Description Describe effect of type, amount and timing of food on blood glucose; list 3 methods for planning meals. Outcome: Progressing Towards Goal 
Goal: *Incorporating physical activity into lifestyle Description State effect of exercise on blood glucose levels. Outcome: Progressing Towards Goal 
Goal: *Developing strategies to promote health/change behavior Description Define the ABC's of diabetes; identify appropriate screenings, schedule and personal plan for screenings. Outcome: Progressing Towards Goal 
Goal: *Using medications safely Description State effect of diabetes medications on diabetes; name diabetes medication taking, action and side effects. Outcome: Progressing Towards Goal 
Goal: *Monitoring blood glucose, interpreting and using results Description Identify recommended blood glucose targets  and personal targets. Outcome: Progressing Towards Goal 
Goal: *Prevention, detection, treatment of acute complications Description List symptoms of hyper- and hypoglycemia; describe how to treat low blood sugar and actions for lowering  high blood glucose level. Outcome: Progressing Towards Goal 
Goal: *Prevention, detection and treatment of chronic complications Description Define the natural course of diabetes and describe the relationship of blood glucose levels to long term complications of diabetes. Outcome: Progressing Towards Goal 
Goal: *Developing strategies to address psychosocial issues Description Describe feelings about living with diabetes; identify support needed and support network Outcome: Progressing Towards Goal 
Goal: *Insulin pump training Outcome: Progressing Towards Goal 
Goal: *Sick day guidelines Outcome: Progressing Towards Goal 
Goal: *Patient Specific Goal (EDIT GOAL, INSERT TEXT) Outcome: Progressing Towards Goal 
  
Problem: Patient Education: Go to Patient Education Activity Goal: Patient/Family Education Outcome: Progressing Towards Goal 
  
Problem: Patient Education: Go to Patient Education Activity Goal: Patient/Family Education Outcome: Progressing Towards Goal 
  
Problem: Discharge Planning Goal: *Discharge to safe environment Outcome: Progressing Towards Goal 
  
Problem: Patient Education: Go to Patient Education Activity Goal: Patient/Family Education Outcome: Progressing Towards Goal 
  
Problem: Infection - Risk of, Surgical Site Infection Goal: *Absence of surgical site infection signs and symptoms Outcome: Progressing Towards Goal 
  
Problem: Patient Education: Go to Patient Education Activity Goal: Patient/Family Education Outcome: Progressing Towards Goal 
  
Problem: Infection - Risk of, Surgical Site Infection Goal: *Absence of surgical site infection signs and symptoms Outcome: Progressing Towards Goal 
  
Problem: Patient Education: Go to Patient Education Activity Goal: Patient/Family Education Outcome: Progressing Towards Goal 
  
Problem: Patient Education: Go to Patient Education Activity Goal: Patient/Family Education Outcome: Progressing Towards Goal 
  
Problem: Nutrition Deficit Goal: *Optimize nutritional status Outcome: Progressing Towards Goal 
  
Problem: Nutrition Deficit Goal: *Optimize nutritional status Outcome: Progressing Towards Goal 
  
 Problem: Nutrition Deficit Goal: *Optimize nutritional status Outcome: Progressing Towards Goal

## 2019-11-08 NOTE — PROGRESS NOTES
[facility] 
[address] [facility], [address] GI PROGRESS NOTE Will Mulvane Meron, [place] [facility] [phone] NP/PA in-hospital cell phone M-F until 4:30PM 
After 5PM or on weekends, please call  for physician on call NAME: Sarah Bass :  1950 MRN:  [id number] Subjective:  
Patient is sitting in the chair. No nausea, vomiting, or abdominal pain. He reports some loose stools today. No melena or hematochezia. Objective: VITALS:  
Last 24hrs VS reviewed since prior progress note. Most recent are: 
Visit Vitals BP 98/71 (BP 1 Location: Right arm) Pulse 86 Temp 98.5 °F (36.9 °C) Resp 17 Ht 6' 2\" (1.88 m) Wt 85.8 kg (189 lb 2.5 oz) SpO2 94% BMI 24.29 kg/m² PHYSICAL EXAM: 
General: Cooperative, no acute distress   
Neurologic:  Alert and oriented HEENT: EOMI, no scleral icterus Lungs:  Diminished bilaterally anteriorly Heart:  S1 S2 Abdomen: Soft, non-distended, no tenderness, no guarding, no rebound. +Bowel sounds. Extremities: Warm Psych:   Not anxious or agitated Lab Data Reviewed:  
 
Recent Results (from the past 24 hour(s)) GLUCOSE, POC Collection Time: 19  4:46 PM  
Result Value Ref Range Glucose (POC) 116 (H) 65 - 100 mg/dL Performed by Albert ROCK   
HGB & HCT Collection Time: 19  5:54 PM  
Result Value Ref Range HGB 8.5 (L) 12.1 - 17.0 g/dL HCT 27.9 (L) 36.6 - 50.3 % GLUCOSE, POC Collection Time: 19  9:33 PM  
Result Value Ref Range Glucose (POC) 113 (H) 65 - 100 mg/dL Performed by Alexis Segura   
LD Collection Time: 19  3:47 AM  
Result Value Ref Range  (H) 85 - 241 U/L  
NT-PRO BNP Collection Time: 19  3:47 AM  
Result Value Ref Range NT pro-BNP 7,603 (H) <125 PG/ML  
LACTIC ACID Collection Time: 19  3:47 AM  
Result Value Ref Range  Lactic acid 0.9 0.4 - 2.0 MMOL/L  
CBC W/O DIFF  
 Collection Time: 11/08/19  3:47 AM  
Result Value Ref Range WBC 4.9 4.1 - 11.1 K/uL  
 RBC 2.89 (L) 4.10 - 5.70 M/uL HGB 8.5 (L) 12.1 - 17.0 g/dL HCT 27.7 (L) 36.6 - 50.3 % MCV 95.8 80.0 - 99.0 FL  
 MCH 29.4 26.0 - 34.0 PG  
 MCHC 30.7 30.0 - 36.5 g/dL RDW 20.1 (H) 11.5 - 14.5 % PLATELET 97 (L) 124 - 400 K/uL MPV 10.3 8.9 - 12.9 FL  
 NRBC 0.0 0  WBC ABSOLUTE NRBC 0.00 0.00 - 0.01 K/uL PROTHROMBIN TIME + INR Collection Time: 11/08/19  3:47 AM  
Result Value Ref Range INR 1.1 0.9 - 1.1 Prothrombin time 11.3 (H) 9.0 - 11.1 sec PTT Collection Time: 11/08/19  3:47 AM  
Result Value Ref Range aPTT 26.4 22.1 - 32.0 sec  
 aPTT, therapeutic range     58.0 - 77.0 SECS  
MAGNESIUM Collection Time: 11/08/19  3:47 AM  
Result Value Ref Range Magnesium 2.1 1.6 - 2.4 mg/dL METABOLIC PANEL, COMPREHENSIVE Collection Time: 11/08/19  3:47 AM  
Result Value Ref Range Sodium 136 136 - 145 mmol/L Potassium 4.1 3.5 - 5.1 mmol/L Chloride 100 97 - 108 mmol/L  
 CO2 30 21 - 32 mmol/L Anion gap 6 5 - 15 mmol/L Glucose 85 65 - 100 mg/dL BUN 17 6 - 20 MG/DL Creatinine 1.25 0.70 - 1.30 MG/DL  
 BUN/Creatinine ratio 14 12 - 20 GFR est AA >60 >60 ml/min/1.73m2 GFR est non-AA 57 (L) >60 ml/min/1.73m2 Calcium 8.7 8.5 - 10.1 MG/DL Bilirubin, total 1.6 (H) 0.2 - 1.0 MG/DL  
 ALT (SGPT) 30 12 - 78 U/L  
 AST (SGOT) 36 15 - 37 U/L Alk. phosphatase 136 (H) 45 - 117 U/L Protein, total 5.7 (L) 6.4 - 8.2 g/dL Albumin 2.4 (L) 3.5 - 5.0 g/dL Globulin 3.3 2.0 - 4.0 g/dL A-G Ratio 0.7 (L) 1.1 - 2.2 GLUCOSE, POC Collection Time: 11/08/19  5:58 AM  
Result Value Ref Range Glucose (POC) 88 65 - 100 mg/dL Performed by Brittney Melara Assessment:  
· LVAD evaluation: Impella in place. · Iron deficiency anemia: Hgb 8.5, platelets 97. Bivalirudin on hold.  No signs of active GI bleeding.  
 · Thrombocytopenia: status post 1 unit platelets 80/23/51. · History of colon polyps: Colonoscopy in 2009 with reported tubular adenomas. · Acute on chronic systolic heart failure · History of VF arrest status post AICD · Coronary artery disease status post CABG in 2010 · Atrial fibrillation · Acute kidney injury on chronic kidney disease · Urinary tract infection/hematuria: ID following. Patient Active Problem List  
Diagnosis Code  Paroxysmal atrial fibrillation (HCC) I48.0  Acute on chronic systolic CHF (congestive heart failure) (HCC) I50.23  Systolic CHF, chronic (HCC) I50.22  
 Peripheral vascular disease (HCC) I73.9  Acute decompensated heart failure (HCC) I50.9 Plan: · Monitor CBC and transfuse as necessary · Plan for EGD and colonoscopy on Monday with Dr. Johan Moss. Risks were again discussed. Clear liquids on Sunday. Bowel prep on Sunday evening. NPO after midnight on Sunday. Orders placed. Discussed with Nat Oliveira, NP - anesthesia is aware. Signed By: Fabio Phipps   
 11/8/2019  2:19 PM 
  
 
 
I have examined the patient. I have reviewed the chart and agree with the documentation recorded by the NP, including the assessment, treatment plan, and disposition.  
 
 
 
Irene Jensen MD

## 2019-11-08 NOTE — PROGRESS NOTES
[facility] 
 [address] [facility], [facility] [address] [facility] Phone: [phone]   [phone]   
  
Nephrology Progress Note Sona Kiser     1950     [id number] Date of Admission : 10/25/2019 
11/08/19 CC:  Follow up for JUAN J, CKD, Hyponatremia Assessment and Plan JUAN J on CKD Stage IV : 
- likely from CRS 
- Cr stable and at baseline 
- diurese PRN 
- daily labs CKD IV at baseline w/ L renal atrophy: - NM scan shows equal function Gross hematuria: 
- on CBI now - clearing - per urology Serratia and Enteroccocus UTI: 
- per ID Hyponatremia: 
- from hypervolemia and excessive fluid intake - FR and diurese PRN 
- daily Na levels Hoarseness, vocal cord paralysis, mediastinal adenopathy: 
- will need eventual PET/CT 
- per pulmonary 
  
BPH w/ urinary retention  
- neal in place 
  
Acute on Chronic HFrEF  
- EF 16-20%, NYHA Class IV , hx of VF arrest - s/p AICD 
- Impella insertion 10/29 
- LVAD eval underwaty 
  
JOSE on CPAP  
  
PAF s/p DCCV 6/19 
  
Chronic Anemia: 
- from CKD and iron deficiency - s/p IV iron, now on PAVEL 
- hgb stable - plans for EGD and colon monday Interval History:   
Seen and examined. On CBI now. Cr stable. No cp, sob, n/v/d.  impella in place. Review of Systems: Pertinent items are noted in HPI. Current Medications:  
Current Facility-Administered Medications Medication Dose Route Frequency  0.9% sodium chloride infusion 250 mL  250 mL IntraVENous PRN  
 amiodarone (CORDARONE) tablet 100 mg  100 mg Oral DAILY  dronabinol (MARINOL) capsule 2.5 mg  2.5 mg Oral ACB&D  piperacillin-tazobactam (ZOSYN) 3.375 g in 0.9% sodium chloride (MBP/ADV) 100 mL  3.375 g IntraVENous Q8H  
 insulin lispro (HUMALOG) injection   SubCUTAneous AC&HS  bumetanide (BUMEX) injection 1 mg  1 mg IntraVENous Q6H PRN  
 fluticasone propionate (FLONASE) 50 mcg/actuation nasal spray 2 Spray  2 Spray Both Nostrils DAILY  sodium chloride (OCEAN) 0.65 % nasal squeeze bottle 2 Spray  2 Spray Both Nostrils QID  alteplase (CATHFLO) 1 mg in dextrose 5% 50 mL impella purge solution  1 mg Other TITRATE  epoetin yvonne-epbx (RETACRIT) injection 10,000 Units  10,000 Units SubCUTAneous Q TUE, THU & SAT  pantoprazole (PROTONIX) tablet 40 mg  40 mg Oral ACB  dextrose 5% infusion  4-20 mL/hr IntraVENous CONTINUOUS  
 bivalirudin (ANGIOMAX) 250 mg in dextrose 5% 250 mL infusion  4-20 mL/hr Other TITRATE  alteplase (CATHFLO) 1 mg in sterile water (preservative free) 1 mL injection  1 mg InterCATHeter PRN  
 bacitracin 500 unit/gram packet 1 Packet  1 Packet Topical PRN  
 sodium chloride (NS) flush 5-40 mL  5-40 mL IntraVENous Q8H  
 sodium chloride (NS) flush 5-40 mL  5-40 mL IntraVENous PRN  
 0.45% sodium chloride infusion  10 mL/hr IntraVENous CONTINUOUS  
 0.9% sodium chloride infusion  10 mL/hr IntraVENous CONTINUOUS  
 oxyCODONE IR (ROXICODONE) tablet 5 mg  5 mg Oral Q4H PRN  
 oxyCODONE IR (ROXICODONE) tablet 10 mg  10 mg Oral Q4H PRN  
 morphine injection 4 mg  4 mg IntraVENous Q2H PRN  
 naloxone (NARCAN) injection 0.4 mg  0.4 mg IntraVENous PRN  
 ondansetron (ZOFRAN) injection 4 mg  4 mg IntraVENous Q4H PRN  
 albuterol (PROVENTIL VENTOLIN) nebulizer solution 2.5 mg  2.5 mg Nebulization Q4H PRN  chlorhexidine (PERIDEX) 0.12 % mouthwash 10 mL  10 mL Oral Q12H  
 magnesium oxide (MAG-OX) tablet 400 mg  400 mg Oral BID  calcium chloride 1 g in 0.9% sodium chloride 250 mL IVPB  1 g IntraVENous PRN  
 bisacodyl (DULCOLAX) suppository 10 mg  10 mg Rectal DAILY PRN  
 senna-docusate (PERICOLACE) 8.6-50 mg per tablet 1 Tab  1 Tab Oral BID  polyethylene glycol (MIRALAX) packet 17 g  17 g Oral DAILY  ELECTROLYTE REPLACEMENT NOTE: Nurse to review Serum Potassium and Magnesuim levels and Initiate Electrolyte Replacement Protocol as needed  1 Each Other PRN  
  magnesium sulfate 1 g/100 ml IVPB (premix or compounded)  1 g IntraVENous PRN  
 glucose chewable tablet 16 g  4 Tab Oral PRN  
 glucagon (GLUCAGEN) injection 1 mg  1 mg IntraMUSCular PRN  
 dextrose 10% infusion 0-250 mL  0-250 mL IntraVENous PRN  
 morphine injection 2 mg  2 mg IntraVENous Q2H PRN  
 melatonin tablet 3 mg  3 mg Oral QHS PRN  
 albumin human 5% (BUMINATE) solution 25 g  25 g IntraVENous Q2H PRN  
 influenza vaccine 2019-20 (6 mos+)(PF) (FLUARIX/FLULAVAL/FLUZONE QUAD) injection 0.5 mL  0.5 mL IntraMUSCular PRIOR TO DISCHARGE  
 [Held by provider] aspirin delayed-release tablet 81 mg  81 mg Oral DAILY  tamsulosin (FLOMAX) capsule 0.8 mg  0.8 mg Oral DAILY  allopurinol (ZYLOPRIM) tablet 100 mg  100 mg Oral DAILY No Known Allergies Objective: 
Vitals:   
Vitals:  
 11/08/19 0400 11/08/19 0500 11/08/19 0600 11/08/19 0700 BP: 101/86 (!) 109/91 105/83 128/89 Pulse: 70 81 78 92 Resp: 12 23 21 27 Temp: 98.3 °F (36.8 °C) SpO2: 100% 100% 91% 98% Weight: 85.8 kg (189 lb 2.5 oz) Height:      
 
Intake and Output: 
No intake/output data recorded. 11/06 1901 - 11/08 0700 In: 61239.3 [I.V.:1532.7] Out: 46083 Physical Examination: 
Pt intubated     No 
General: Awake and alert Neck:  Supple, no mass Resp:  Lungs few dependent rales CV:  RRR,  no murmur or rub, trace b/l LE edema GI:  Soft, NT, + Bowel sounds Neurologic:  AOx3, nonfocal exam 
Psych:             Normal mood and affect Skin:  No Rash :  Lizama w/ blood-tinged urine [x]    High complexity decision making was performed 
[]    Patient is at high-risk of decompensation with multiple organ involvement Lab Data Personally Reviewed: I have reviewed all the pertinent labs, microbiology data and radiology studies during assessment. Recent Labs 11/08/19 
8837 11/07/19 
0404 11/06/19 
5381  135* 132* K 4.1 3.6 3.9  98 95* CO2 30 33* 33* GLU 85 90 83 BUN 17 18 18 CREA 1.25 1.19 1.03  
CA 8.7 8.7 8.5 MG 2.1 2.3 2.3 ALB 2.4* 2.4* 2.3*  
SGOT 36 40* 42* ALT 30 30 27 INR 1.1 1.1 1.1 Recent Labs 11/08/19 
7180 11/07/19 
1754 11/07/19 
0404 11/06/19 
6511 WBC 4.9  --  4.6 4.9 HGB 8.5* 8.5* 7.8* 8.0*  
HCT 27.7* 27.9* 25.4* 26.0*  
PLT 97*  --  90* 84* No results found for: SDES Lab Results Component Value Date/Time Culture result: SERRATIA MARCESCENS (A) 10/31/2019 10:05 AM  
 Culture result: SERRATIA MARCESCENS (2ND COLONY TYPE/STRAIN) (A) 10/31/2019 10:05 AM  
 Culture result: ENTEROCOCCUS FAECALIS GROUP D (A) 10/31/2019 10:05 AM  
 Culture result: NO GROWTH 5 DAYS 10/25/2019 07:14 PM  
 Culture result: ENTEROBACTER CLOACAE (A) 06/26/2019 12:25 PM  
 
Recent Results (from the past 24 hour(s)) GLUCOSE, POC Collection Time: 11/07/19 12:22 PM  
Result Value Ref Range Glucose (POC) 105 (H) 65 - 100 mg/dL Performed by Haris Vogt, POC Collection Time: 11/07/19  4:46 PM  
Result Value Ref Range Glucose (POC) 116 (H) 65 - 100 mg/dL Performed by Amye Severance FAITH ELIZABETH   
HGB & HCT Collection Time: 11/07/19  5:54 PM  
Result Value Ref Range HGB 8.5 (L) 12.1 - 17.0 g/dL HCT 27.9 (L) 36.6 - 50.3 % GLUCOSE, POC Collection Time: 11/07/19  9:33 PM  
Result Value Ref Range Glucose (POC) 113 (H) 65 - 100 mg/dL Performed by Rosemary Reese   
LD Collection Time: 11/08/19  3:47 AM  
Result Value Ref Range  (H) 85 - 241 U/L  
NT-PRO BNP Collection Time: 11/08/19  3:47 AM  
Result Value Ref Range NT pro-BNP 7,603 (H) <125 PG/ML  
LACTIC ACID Collection Time: 11/08/19  3:47 AM  
Result Value Ref Range Lactic acid 0.9 0.4 - 2.0 MMOL/L  
CBC W/O DIFF Collection Time: 11/08/19  3:47 AM  
Result Value Ref Range WBC 4.9 4.1 - 11.1 K/uL  
 RBC 2.89 (L) 4.10 - 5.70 M/uL HGB 8.5 (L) 12.1 - 17.0 g/dL HCT 27.7 (L) 36.6 - 50.3 %  MCV 95.8 80.0 - 99.0 FL  
 MCH 29.4 26.0 - 34.0 PG  
 MCHC 30.7 30.0 - 36.5 g/dL RDW 20.1 (H) 11.5 - 14.5 % PLATELET 97 (L) 427 - 400 K/uL MPV 10.3 8.9 - 12.9 FL  
 NRBC 0.0 0  WBC ABSOLUTE NRBC 0.00 0.00 - 0.01 K/uL PROTHROMBIN TIME + INR Collection Time: 11/08/19  3:47 AM  
Result Value Ref Range INR 1.1 0.9 - 1.1 Prothrombin time 11.3 (H) 9.0 - 11.1 sec PTT Collection Time: 11/08/19  3:47 AM  
Result Value Ref Range aPTT 26.4 22.1 - 32.0 sec  
 aPTT, therapeutic range     58.0 - 77.0 SECS  
MAGNESIUM Collection Time: 11/08/19  3:47 AM  
Result Value Ref Range Magnesium 2.1 1.6 - 2.4 mg/dL METABOLIC PANEL, COMPREHENSIVE Collection Time: 11/08/19  3:47 AM  
Result Value Ref Range Sodium 136 136 - 145 mmol/L Potassium 4.1 3.5 - 5.1 mmol/L Chloride 100 97 - 108 mmol/L  
 CO2 30 21 - 32 mmol/L Anion gap 6 5 - 15 mmol/L Glucose 85 65 - 100 mg/dL BUN 17 6 - 20 MG/DL Creatinine 1.25 0.70 - 1.30 MG/DL  
 BUN/Creatinine ratio 14 12 - 20 GFR est AA >60 >60 ml/min/1.73m2 GFR est non-AA 57 (L) >60 ml/min/1.73m2 Calcium 8.7 8.5 - 10.1 MG/DL Bilirubin, total 1.6 (H) 0.2 - 1.0 MG/DL  
 ALT (SGPT) 30 12 - 78 U/L  
 AST (SGOT) 36 15 - 37 U/L Alk. phosphatase 136 (H) 45 - 117 U/L Protein, total 5.7 (L) 6.4 - 8.2 g/dL Albumin 2.4 (L) 3.5 - 5.0 g/dL Globulin 3.3 2.0 - 4.0 g/dL A-G Ratio 0.7 (L) 1.1 - 2.2 GLUCOSE, POC Collection Time: 11/08/19  5:58 AM  
Result Value Ref Range Glucose (POC) 88 65 - 100 mg/dL Performed by Rosemary Reese Total time spent with patient:  xxx   min. Care Plan discussed with: 
Patient Family RN Consulting Physician [address] [facility],      
 
I have reviewed the flowsheets. Chart and Pertinent Notes have been reviewed. No change in PMH ,family and social history from Consult note.  
 
 
Ela Matos MD

## 2019-11-08 NOTE — PROGRESS NOTES
Pulmonary Remains on vent support Palliative care eval noted Awaiting mother to arrive to transition to comfort care Will see as needed Braden Malagon MD  
 
Note written in error - wrong patient

## 2019-11-08 NOTE — PROGRESS NOTES
Urology Progress Note Patient: Ciro Bradford MRN: [id number]  SSN: [id number] YOB: 1950  Age: 71 y.o. Sex: male ADMITTED: 10/25/2019 to [facility]orro [facility] [I50.9] POD# 10 Days Post-Op Procedure(s): RIGHT AXILLARY IMPELLA INSERTION No complaints. Urine is clear with slow CBI. Vitals: Temp (24hrs), Av.5 °F (36.9 °C), Min:98.3 °F (36.8 °C), Max:98.6 °F (37 °C) Blood pressure 128/89, pulse 92, temperature 98.3 °F (36.8 °C), resp. rate 27, height 6' 2\" (1.88 m), weight 85.8 kg (189 lb 2.5 oz), SpO2 98 %. Intake and Output: 
 1901 -  0700 In: 73116.3 [I.V.:1532.7] Out: 45776 Labs: 
Labs:  
Lab Results Component Value Date/Time WBC 4.9 2019 03:47 AM  
 HGB 8.5 (L) 2019 03:47 AM  
 Creatinine 1.25 2019 03:47 AM  
 
 
 
Assessment/Plan: 
 Can adjust CBI to keep clear. Hand irrigate PRN. Hope to remove neal next week if stays clear. Call if any problems over weekend. Signed By: Michael Miller MD - 2019

## 2019-11-08 NOTE — PROGRESS NOTES
Met with Sona this morning along with Dr. Joan Gates. Dr. Krystina Moreira shared that pending the rest of Sona's work up going well that his date of LVAD implant will be 11/18/2019. When asked how he feels about this he shared excited.  attempted to call his wife to notify her of the date. She did not answer and  left her a voicemail requesting she call  back.  called Sona's son, Pamella Pena (#[phone]), to touch base. Shared the tentative date of implant with Pamella Pena and inquired about his availability to meet with VAD RN for education/training. He is working next [employer] ([employer]) but will be available next Friday. He will call  back once he coordinates with his wife on the time for Friday. In the interim will leave the heartmate dvd in his father's room for him to /take home and watch. Consuelo Thayer, MSW, LCSW Clinical  Kelvin Chakraborty

## 2019-11-08 NOTE — PROGRESS NOTES
[facility] ICU Progress Note Admit Date: 10/25/2019 POD:  8 Day Post-Op Procedure:  Procedure(s): RIGHT AXILLARY IMPELLA INSERTION Subjective:  
Pt seen with Dr. Emmanuel Blackwood. On room air, sitting in the chair. Afebrile. Impella P8, D5 purge. Lizama in [address] [place] [address] per Urology. LE edema significantly improved with CAROL hose and elevation. Received 1 unit PRBC yesterday. Objective:  
Vitals: 
Blood pressure 128/89, pulse 92, temperature 98.3 °F (36.8 °C), resp. rate 27, height 6' 2\" (1.88 m), weight 189 lb 2.5 oz (85.8 kg), SpO2 98 %. Temp (24hrs), Av.5 °F (36.9 °C), Min:98.3 °F (36.8 °C), Max:98.6 °F (37 °C) Hemodynamics: 
 CO: CO (l/min): 8.3 l/min CI: CI (l/min/m2): 4 l/min/m2 CVP: CVP (mmHg): 9 mmHg (19 07) SVR: SVR (dyne*sec)/cm5: 781 (dyne*sec)/cm5 (19 1500) PAP Systolic: PAP Systolic: 45 (99/10/65 8467) PAP Diastolic: PAP Diastolic: 19 (89/85/16 9077) PVR:   
 SV02: SVO2 (%): 51 % (19 1500) SCV02: SCVO2 (%): 75 % (10/29/19 1900) EKG/Rhythm:   
Paced in the 90s Oxygen Therapy: 
Oxygen Therapy O2 Sat (%): 98 % (19 0700) Pulse via Oximetry: 83 beats per minute (19 1500) O2 Device: Nasal cannula (19) O2 Flow Rate (L/min): 4 l/min (19 040) FIO2 (%): 40 % (10/30/19 0846) CXR:  
CXR Results  (Last 48 hours) 19 0507  XR CHEST PORT Final result Impression:  IMPRESSION: No significant change. Narrative:  INDICATION:  postop heart. Heart failure EXAM: CXR Portable. FINDINGS: Portable chest shows support lines/devices show no significant change  
since yesterday. There is no apparent pneumothorax. Lungs show no  
consolidation. Heart size is stable. There is likely mild interstitial pulmonary  
edema. 19 0519  XR CHEST PORT Final result Impression:  IMPRESSION:  
1. The lungs are clear.   
  
 Narrative:  EXAM: XR CHEST PORT  
   
 INDICATION: postop heart surgery, heart failure COMPARISON: 2019 FINDINGS: A portable AP radiograph of the chest was obtained at 0431 hours. The  
patient is on a cardiac monitor. The mild cardiomegaly is unchanged. The PICC  
line overlies the superior vena cava. Cardiac assist device is in satisfactory  
position. ICD is in place. Small metallic monitoring device projects over the  
region of the left lower lobe pulmonary artery The lungs are well aerated. There is no pneumonia or significant atelectasis. No  
significant pulmonary edema. Admission Weight: Last Weight Weight: 192 lb 10.9 oz (87.4 kg) Weight: 189 lb 2.5 oz (85.8 kg) Intake / Output / Drain: 
Current Shift: No intake/output data recorded. Last 24 hrs.:  
 
Intake/Output Summary (Last 24 hours) at 2019 1130 Last data filed at 2019 0700 Gross per 24 hour Intake 82459.6 ml Output 51409 ml Net -393.4 ml EXAM: 
General:   NAD. Up in the chair Lungs:   Diminished in the bases. Incision:  Impella dressing C/D/I Heart:  Regular rate and rhythm, S1, S2 normal, no murmur, click, rub or gallop. Abdomen:   Soft, non-tender. Bowel sounds hypoactive. No masses,  No organomegaly. Lizama with gross hematuria Extremities:  +1 bilateral lower extremity pitting edema. PPP. Neurologic:  Gross motor and sensory apparatus intact. Labs:  
Recent Labs 19 
7934 19 
8818 WBC  --  4.9 HGB  --  8.5* HCT  --  27.7*  
PLT  --  97* NA  --  136 K  --  4.1 BUN  --  17  
CREA  --  1.25  
GLU  --  85 GLUCPOC 88  --   
INR  --  1.1 Assessment:  
 
Active Problems: 
  Acute decompensated heart failure ([facility] (10/25/2019) Plan/Recommendations/Medical Decision Makin. Acute on chronic systolic (CHF Class IV) S/P Impella: Has ICD.   LV EF 16-20%. No BB/ACE/ARB/AA until appropriate. Bumex 1 mg IV QID-PRN. Trend proBNP, lactate, LDH. Strict I/Os. Daily weights. LVAD w/u in process. Cefepime changed to zosyn per ID for prophylaxis. Continue D5 purge due to hematuria/epistaxis 2. Thrombocytopenia: Platelets improving at 97K. No asa. HIT negative, LOAN negative. On protonix. Heme following. 3. CAD S/p CABG 2010: Needs LHC, timing TBD, once UTI has cleared. Stop asa d/t hematuria/thrombocytopenia, not on statin historically. No BB D/t pressors/intropes. 4. PAF s/p DCCV 6/2019: Holding eliquis due to hematuria/epistaxis. On PO amio. 5. JUAN J on CKD stage IV: Monitor. Renal following. Diuretics PRN CVP >10. Keep neal. 6. Hx of urinary retention/BPH, hematuria:  On flomax. Neal with CBI per Urology but will likely wean off. Urology following. 7. JOSE: qhs CPAP. 8. Hx of sternal wound infection requring sternectomy: Not a contraindication for future LVAD. Tagged WBC -- showed no abnormal tracer uptake. 9. Iron def anemia: s/p venofer. H&H stable today. On Epogen. Cannot use doxy/octreotide d/t prolonged QTC. Occult blood in stool positive per POC testing. Gastroenterology following for LVAD work up. 10. Vocal cord paralysis: Voice improving. Speech eval PRN. Advance diet as tolerated. 11. Constipation: Resolved last BM 11/6. Continue pericolace, miralax(pt keeps refusing). Continue daily suppository PRN. 12. Serratia UTI/hematuria: Urology following planning to transition off CBI. On Zosyn-ID following. 13. Mediastinal lymphadenopathy:  Per AHF, low threshold for Carcinoma per CT scans. Eventually needs PET scan. 14. Hyponatremia: Resolved-monitor 15. Acute Moderate protein-calorie malnutrition:  Pre-albumin 10/25 was 13.9. Recheck 11/4 was 16.2. Pt eating cardiac diet well. Continue marinol Dispo: Care and orders per AHFS. Remain in CCU.  
 
Signed By: Papi Andrade NP

## 2019-11-08 NOTE — PROGRESS NOTES
2130 Bedside shift change report given to Ingrid Hernandez (oncoming nurse) by Iris Reyes (offgoing nurse). Report included the following information SBAR, Intake/Output, MAR, Recent Results and Cardiac Rhythm Paced. 0730 Bedside shift change report given to Alon Vu (oncoming nurse) by Ingrid Hernandez (offgoing nurse). Report included the following information SBAR, Intake/Output, MAR, Recent Results and Cardiac Rhythm Paced.

## 2019-11-08 NOTE — PROGRESS NOTES
Calos Rueda 1721 Education with patient: 
Patient given LVAD education binder. Reviewed contents with patient. Reviewed LVAD terms and functions. Patient able to point out equipment and functions with verbal cues. Explained and demonstrated how to add batteries to battery clips, change power from mobile power unit to batteries and back. Patient demonstrated all with verbal and some physical cues. Patient inquired about procedures during inclement weather and emergencies. Patient verbalized understanding. Discussed how patient would switch from Eliquis to Coumadin, and the necessary labs to monitor levels. Patient verbalized understanding. Will meet again with patient on Monday, 11/11/19. Antonette Clarke, RN 
RN VAD Coordinator

## 2019-11-08 NOTE — PROGRESS NOTES
[address] [facility] [place] 904 [facility] [place] in [address], [place] Inpatient Progress Note Patient name: Anya Bingham Patient : 1950 Patient MRN: [id number] Attending MD: Mitch Ham MD 
Date of service: 19 CHIEF COMPLAINT: 
Cardiogenic shock 
  
PLAN: 
Continue current impella speed at P8; cannot tolerate lower speeds Continue bumex 1mg IV every 6 hours PRN for CVP > 10; renal consult appreciated Continue CAROL hose to mobilize LE fluid Transduce CVP off PICC No ACE/ARB/ARNi in anticipation of surgery No AA due to hyponatremia No tolvaptan due to hepatic dysfunction Continue small dose amiodarone for PAF Anemia and thrombocytopenia likely due to hematuria and hemolysis Urinary retention and Serratia UTI with hematuria, on antibiotics by ID - ?length of therapy - need to determine timing of LVAD implant Urology consult appreciated, continue bladder irrigation Request cystoscopy to evaluate for bladder carcinoma prior to LVAD implant Anticoagulation and ASA on hold due to hematuria and epistaxis Cannot use octeotride or doxycycline for epistaxis/AVM prophylaxis due to QTc 502 ms Nutritionist consult, prealbumins weekly Continue Marinol and 6 small meals daily Pulmonary consult appreciated - repeat chest CT showing again mild lymphadenopathy, no mass/nodules Unable to perform DLCO while on impella support PFTs unchanged post bronchodilator therapy, but adequate for LVAD consideration Colonoscopy, EGD scheduled for Monday at 12:30 at the bedside Delay [facility] until UTI clears and GI evaluation completed to r/o CA - tentatively requested for  Unble to have PET scan due to dextrose required for Impella infusion Ongoing PT/OT/VAD education Tentatively planned for LVAD implant  - DT letter requested from [facility] IMPRESSION: 
Cardiogenic shock S/p Impella 5.0 implant 10/29/19 by Dr. Keon Main Acute on chronic systolic heart failure Stage D, NYHA class IV symptoms Non-ischemic cardiomyopathy, LVEF 15%, LVEDD 5.7cm Hyponatremia Liver dysfunction Thrombocytopenia H/o VF arrest s/p ICD 
CAD s/p CABG 2010 C/b sternal wound infection requiring sternectomy CKD PAF s/p DCCV 61/8/19 UTI with GNR Anemia, iron deficiency Cardiac cachexia Vocal cord paralysis Epistaxis Hematuria Patient seen and examined. Data and note reviewed. I have discussed and agree with the plans as noted. 71year old male with a history of CAD, s/p CABG complicated by mediastinitis requiring sternectomy, ICM who was admitted with CS despite chronic inotropic support. He is undergoing evaluation for LVAD as DT. Await results of EGD/colonsocopy and cystoscopy to exclude malignancy. He remains hemodynamically stable on Impella support at P8. Thank you for allowing us to participate in your patient's care. Mounika Moreira MD, Solomon Pruitt Chief of Cardiology, [facility] Medical Director Calos Rueda [address] [place] [address], [address] [place] [address] Office [phone] Fax [phone] 
 
 
  
CARDIAC IMAGING: 
Tagged WBC negative 
  INTERVAL HISTORY: 
Feels well today Hgb 8.5 from 7.8 
CBI continues, hematuria improving MAPS at goal 
CVP around 6 
PLT 97 from 90 PHYSICAL EXAM: 
Visit Vitals BP 98/71 (BP 1 Location: Right arm) Pulse 86 Temp 98.5 °F (36.9 °C) Resp 17 Ht 6' 2\" (1.88 m) Wt 189 lb 2.5 oz (85.8 kg) SpO2 94% BMI 24.29 kg/m² Impella (5.0) Performance Level (P Level): 8 Flow Rate L/min (0-2.5): 4.4 L/min Placement Signal (mmHg): Differential 5.0 (s/d 30-60/0 ex) Placement Signal (Systolic/Diastolic): (70/-38) Motor Current (amp): (normal) Motor Current (Systolic/Diastolic): 847/323 Placement Monitoring: OK Purge Pressure (300-1100 mm Hg): 415 Purge Flow (ml/hr): 12.3 Sidearm Pressure Bag @ 300 mmHg/ flushed (Na with 5.0 Impella): No 
Power (AC/Battery): Yes Impella Pump Serial Number: [id number] Hemodynamics: 
 CVP: CVP (mmHg): 7 mmHg (11/08/19 1200) General: Patient is well developed, cachectic, well-nourished in no acute distress HEENT: Normocephalic and atraumatic. No scleral icterus. Pupils are equal, round and reactive to light and accomodation. No conjunctival injection. Oropharynx is clear. Neck: Supple. No evidence of thyroid enlargements or lymphadenopathy. JVD: Cannot be appreciated Lungs: Breath sounds are equal and clear bilaterally. No wheezes, rhonchi, or rales. Heart: Regular rate and rhythm with normal S1 and S2. No murmurs, gallops or rubs. Abdomen: Soft, no mass or tenderness. No organomegaly or hernia. Bowel sounds present. Genitourinary and rectal: neal in place, gross hematuria noted. Extremities: No cyanosis, clubbing. 2-3+ LE pitting edema. Neurologic: No focal sensory or motor deficits are noted. Grossly intact. Psychiatric: Awake, alert and oriented x 3. Appropriate mood and affect. Skin: Warm, dry and well perfused. Multiple areas of ecchymosis on his arms. REVIEW OF SYSTEMS: 
General: Denies fever, night sweats. Ear, nose and throat: Denies difficulty hearing, sinus problems, runny nose, post-nasal drip, ringing in ears, mouth sores, loose teeth, ear pain, nosebleeds, sore throat, facial pain or numbess Cardiovascular: see above in the interval history Respiratory: Denies cough, wheezing, sputum production, hemoptysis. Gastrointestinal: Denies heartburn, constipation, intolerance to certain foods, diarrhea, abdominal pain, nausea, vomiting, difficulty swallowing, blood in stool Kidney and bladder: Reports improved hematuria Musculoskeletal: Denies joint pain, muscle weakness Skin and hair: Denies change in existing skin lesions, hair loss or increase PAST MEDICAL HISTORY: 
Past Medical History:  
Diagnosis Date  Degenerative disc disease, lumbar  Heart failure ([facility] [address]  High cholesterol  Hypertension  Paroxysmal atrial fibrillation ([facility] [address] 2019  Spinal stenosis PAST SURGICAL HISTORY: 
Past Surgical History:  
Procedure Laterality Date  HX APPENDECTOMY  HX CORONARY ARTERY BYPASS GRAFT    
 triple  HX HERNIA REPAIR    
 HX IMPLANTABLE CARDIOVERTER DEFIBRILLATOR  [facility] CARDIOVERSION ELECTIVE ARRHYTHMIA EXTERNAL N/A 6/10/2019 EP CARDIOVERSION performed by Jhonatan Cartwright MD at [facility], [facility] CATH LAB  VA CARDIOVERSION ELECTIVE ARRHYTHMIA EXTERNAL N/A 2019 EP CARDIOVERSION performed by Derwin Schwab, MD at [address] [place] [address], [facility] CATH LAB  VA INSJ/RPLCMT PERM DFB W/TRNSVNS LDS 1/DUAL CHMBR N/A 2019 INSERT ICD BIV MULTI performed by Meena Briggs MD at [address] [place] [address], [facility] CATH LAB  VA TCAT IMPL WRLS P-ART PRS SNR L-T HEMODYN MNTR N/A 2019 IMPLANT HEART FAILURE MONITORING DEVICE performed by Alex Martin MD at [address] [place] [address], [facility] CATH LAB FAMILY HISTORY: 
Family History Problem Relation Age of Onset  Lung Disease Mother  Hypertension Mother Lito Shield Arthritis-osteo Mother  Heart Disease Mother  Heart Disease Father  Diabetes Father SOCIAL HISTORY: 
Social History Socioeconomic History  Marital status:  Spouse name: Not on file  Number of children: Not on file  Years of education: Not on file  Highest education level: Not on file Tobacco Use  Smoking status: Former Smoker Last attempt to quit: 2010 Years since quittin.9  Smokeless tobacco: Never Used Substance and Sexual Activity  Alcohol use: Not Currently Comment: rarely  Drug use: Never Other Topics Concern LABORATORY RESULTS: 
  
Labs Latest Ref Rng & Units 2019 2019 2019 2019 2019 2019 11/3/2019 WBC 4.1 - 11.1 K/uL 4.9 - 4.6 4.9 5.3 5.5 4.9 RBC 4.10 - 5.70 M/uL 2.89(L) - 2.69(L) 2.76(L) 2.83(L) 3.03(L) 2.92(L) Hemoglobin 12.1 - 17.0 g/dL 8.5(L) 8.5(L) 7. 8(L) 8.0(L) 8. 3(L) 8.8(L) 8.4(L) Hematocrit 36.6 - 50.3 % 27. 7(L) 27. 9(L) 25. 4(L) 26. 0(L) 26. 4(L) 28. 1(L) 27. 1(L) MCV 80.0 - 99.0 FL 95.8 - 94.4 94.2 93.3 92.7 92.8 Platelets 073 - 176 K/uL 97(L) - 90(L) 84(L) 80(L) 86(L) 81(L) Lymphocytes 12 - 49 % - - - - - - - Monocytes 5 - 13 % - - - - - - - Eosinophils 0 - 7 % - - - - - - - Basophils 0 - 1 % - - - - - - - Albumin 3.5 - 5.0 g/dL 2. 4(L) - 2. 4(L) 2. 3(L) 2. 4(L) 2. 6(L) 2. 7(L) Calcium 8.5 - 10.1 MG/DL 8.7 - 8.7 8.5 8.5 8.7 8.4(L) SGOT 15 - 37 U/L 36 - 40(H) 42(H) 50(H) 50(H) 55(H) Glucose 65 - 100 mg/dL 85 - 90 83 90 92 87 BUN 6 - 20 MG/DL 17 - 18 18 25(H) 25(H) 28(H) Creatinine 0.70 - 1.30 MG/DL 1.25 - 1.19 1.03 1.07 1.19 1.16 Sodium 136 - 145 mmol/L 136 - 135(L) 132(L) 128(L) 130(L) 132(L) Potassium 3.5 - 5.1 mmol/L 4.1 - 3.6 3.9 4.3 4.2 3.9 TSH 0.36 - 3.74 uIU/mL - - - - - - -  
PSA 0.01 - 4.0 ng/mL - - - - - - -  
LDH 85 - 241 U/L 439(H) - 458(H) 436(H) 482(H) 458(H) 435(H) Some recent data might be hidden Lab Results Component Value Date/Time TSH 2.40 10/25/2019 07:39 PM  
 TSH 2.45 06/01/2019 04:16 AM  
 
 
ALLERGY: 
No Known Allergies CURRENT MEDICATIONS: 
 
Current Facility-Administered Medications:  
  0.9% sodium chloride infusion 250 mL, 250 mL, IntraVENous, PRN, Tae Santoro MD 
  amiodarone (CORDARONE) tablet 100 mg, 100 mg, Oral, DAILY, Nino Herrera NP, 100 mg at 11/08/19 0849 
  dronabinol (MARINOL) capsule 2.5 mg, 2.5 mg, Oral, ACB&D, Nino Herrera NP, 2.5 mg at 11/08/19 8913   piperacillin-tazobactam (ZOSYN) 3.375 g in 0.9% sodium chloride (MBP/ADV) 100 mL, 3.375 g, IntraVENous, Q8H, Jamir Martinez MD, Last Rate: 25 mL/hr at 11/08/19 0848, 3.375 g at 11/08/19 0848   insulin lispro (HUMALOG) injection, , SubCUTAneous, AC&HS, Agapito, Liza Hernandez MD, Stopped at 11/06/19 1630   bumetanide (BUMEX) injection 1 mg, 1 mg, IntraVENous, Q6H PRN, Mavis Carrizales MD, 1 mg at 11/06/19 2254   fluticasone propionate (FLONASE) 50 mcg/actuation nasal spray 2 Spray, 2 Spray, Both Nostrils, DAILY, Mavis Carrizales MD, 2 [place] at 11/04/19 0849 
  sodium chloride (OCEAN) 0.65 % nasal squeeze bottle 2 Spray, 2 Spray, Both Nostrils, QID, Mavis Carrizales MD, 2 [place] at 11/07/19 2140   alteplase (CATHFLO) 1 mg in dextrose 5% 50 mL impella purge solution, 1 mg, Other, TITRATE, Say Montelongo MD, Last Rate: 0 mL/hr at 11/03/19 0937, 1 mg at 11/03/19 2043   epoetin yvonne-epbx (RETACRIT) injection 10,000 Units, 10,000 Units, SubCUTAneous, Q TUE, THU & SAT, Jean Shafer MD, 10,000 Units at 11/07/19 2028   pantoprazole (PROTONIX) tablet 40 mg, 40 mg, Oral, ACB, Yesenia DIOR NP, 40 mg at 11/08/19 2500   dextrose 5% infusion, 4-20 mL/hr, IntraVENous, CONTINUOUS, Yesenia DIOR NP, Last Rate: 17.1 mL/hr at 11/04/19 0421, 17.1 mL/hr at 11/04/19 0421   bivalirudin (ANGIOMAX) 250 mg in dextrose 5% 250 mL infusion, 4-20 mL/hr, Other, TITRATE, Aura Ramirez NP, Stopped at 11/01/19 2210 
  alteplase (CATHFLO) 1 mg in sterile water (preservative free) 1 mL injection, 1 mg, InterCATHeter, PRN, Yesenia DIOR NP, 1 mg at 11/03/19 1344   bacitracin 500 unit/gram packet 1 Packet, 1 Packet, Topical, PRN, Aura Ramirez NP 
  sodium chloride (NS) flush 5-40 mL, 5-40 mL, IntraVENous, Q8H, Yesenia DIOR NP, 10 mL at 11/08/19 9701   sodium chloride (NS) flush 5-40 mL, 5-40 mL, IntraVENous, PRN, Aura Ramirez NP 
  0.45% sodium chloride infusion, 10 mL/hr, IntraVENous, CONTINUOUS, Yesenia DIOR, NP, Last Rate: 10 mL/hr at 10/29/19 1321, 10 mL/hr at 10/29/19 1321 
  0.9% sodium chloride infusion, 10 mL/hr, IntraVENous, CONTINUOUS, Shana Sims, NP, Last Rate: 10 mL/hr at 10/31/19 1300, 10 mL/hr at 10/31/19 1300 
  oxyCODONE IR (ROXICODONE) tablet 5 mg, 5 mg, Oral, Q4H PRN, Verner Carbine D, NP, 5 mg at 11/06/19 0929 
  oxyCODONE IR (ROXICODONE) tablet 10 mg, 10 mg, Oral, Q4H PRN, Elvia Every, NP, 10 mg at 11/07/19 2137   morphine injection 4 mg, 4 mg, IntraVENous, Q2H PRN, Elvia Every, NP 
  naloxone [facility]) injection 0.4 mg, 0.4 mg, IntraVENous, PRN, Elvia Every, NP 
  ondansetron [facility]) injection 4 mg, 4 mg, IntraVENous, Q4H PRN, Verner Carbine D, NP, 4 mg at 10/31/19 6255 
  albuterol (PROVENTIL VENTOLIN) nebulizer solution 2.5 mg, 2.5 mg, Nebulization, Q4H PRN, Verner Carbine D, NP, 2.5 mg at 11/06/19 9264   chlorhexidine (PERIDEX) 0.12 % mouthwash 10 mL, 10 mL, Oral, Q12H, Elvia Every, NP, Stopped at 11/07/19 2100 
  magnesium oxide (MAG-OX) tablet 400 mg, 400 mg, Oral, BID, Verner Carbine D, NP, 400 mg at 11/08/19 6361   calcium chloride 1 g in 0.9% sodium chloride 250 mL IVPB, 1 g, IntraVENous, PRN, Elvia Every, NP 
  bisacodyl (DULCOLAX) suppository 10 mg, 10 mg, Rectal, DAILY PRN, Elvia Every, NP 
  senna-docusate (PERICOLACE) 8.6-50 mg per tablet 1 Tab, 1 Tab, Oral, BID, Elvia Every, NP, 1 Tab at 11/07/19 1705   polyethylene glycol (MIRALAX) packet 17 g, 17 g, Oral, DAILY, Verner Carbine D, NP, 17 g at 11/06/19 0900   ELECTROLYTE REPLACEMENT NOTE: Nurse to review Serum Potassium and Magnesuim levels and Initiate Electrolyte Replacement Protocol as needed, 1 Each, Other, PRN, Elvia Every, NP 
  magnesium sulfate 1 g/100 ml IVPB (premix or compounded), 1 g, IntraVENous, PRN, Elvia Every, NP 
  glucose chewable tablet 16 g, 4 Tab, Oral, PRN, Elvia Every, NP 
  glucagon [facility]) injection 1 mg, 1 mg, IntraMUSCular, PRN, Elvia Every, NP 
  dextrose 10% infusion 0-250 mL, 0-250 mL, IntraVENous, PRN, Elvia Every, NP 
   morphine injection 2 mg, 2 mg, IntraVENous, Q2H PRN, Bob Cornell NP 
  melatonin tablet 3 mg, 3 mg, Oral, QHS PRN, Bob Cornell NP, 3 mg at 10/30/19 2056   albumin human 5% (BUMINATE) solution 25 g, 25 g, IntraVENous, Q2H PRN, Colby Lozano MD, 25 g at 10/30/19 9627   influenza vaccine 2019-20 (6 mos+)(PF) (FLUARIX/FLULAVAL/FLUZONE QUAD) injection 0.5 mL, 0.5 mL, IntraMUSCular, PRIOR TO DISCHARGE, Bob Cornell NP 
  [Held by provider] aspirin delayed-release tablet 81 mg, 81 mg, Oral, DAILY, Orpha Bakari D, NP, 81 mg at 10/31/19 9676   tamsulosin (FLOMAX) capsule 0.8 mg, 0.8 mg, Oral, DAILY, Orpha Bakari D, NP, 0.8 mg at 11/08/19 8050   allopurinol (ZYLOPRIM) tablet 100 mg, 100 mg, Oral, DAILY, Orpha Bakari D, NP, 100 mg at 11/08/19 2056 Thank you for allowing me to participate in this patient's care. Vince Frazier, [facility] [place] [address], [address] Phone: [phone] Fax: [phone]

## 2019-11-09 NOTE — PROGRESS NOTES
[facility] 
 [address] [facility], [address] [facility] Phone: [phone]   [phone]   
  
Nephrology Progress Note Sona Kiser     1950     [id number] Date of Admission : 10/25/2019 
11/09/19 CC:  Follow up for JUAN J, CKD, Hyponatremia Assessment and Plan JUAN J on CKD : 
- 2/2 CRS and resolved and better than baseline  
- daily labs  
- diuretics per AHF team  
 
CKD III at baseline w/ L renal atrophy: - NM scan shows equal function 
- baseline Cr elevated from CRS and urinary retention issues - Cr much better than baseline  
- agree w/ holding ace/arb in preparation for VAD Gross hematuria: 
- on CBI now - clearing - per urology Serratia and Enteroccocus UTI: 
- per ID Hyponatremia: 
- from hypervolemia and excessive fluid intake - FR and diurese PRN 
- daily Na levels Hoarseness, vocal cord paralysis, mediastinal adenopathy: 
- will need eventual PET/CT 
- per pulmonary 
  
BPH w/ urinary retention  
- neal in place 
  
Acute on Chronic HFrEF  
- EF 16-20%, NYHA Class IV , hx of VF arrest - s/p AICD 
- Impella insertion 10/29 
- LVAD eval underwaty 
  
JOSE on CPAP  
  
PAF s/p DCCV 6/19 
  
Chronic Anemia: 
- from CKD and iron deficiency - s/p IV iron, now on PAVEL 
- hgb stable - plans for EGD and colon monday Interval History:   
Seen and examined. Remains On CBI now. Cr stable. No cp, sob, n/v/d.  impella in place. Review of Systems: Pertinent items are noted in HPI. Current Medications:  
Current Facility-Administered Medications Medication Dose Route Frequency  [START ON 11/10/2019] peg 3350-electrolytes (COLYTE) 4000 mL  4,000 mL Oral ONCE  
 0.9% sodium chloride infusion 250 mL  250 mL IntraVENous PRN  
 amiodarone (CORDARONE) tablet 100 mg  100 mg Oral DAILY  dronabinol (MARINOL) capsule 2.5 mg  2.5 mg Oral ACB&D  piperacillin-tazobactam (ZOSYN) 3.375 g in 0.9% sodium chloride (MBP/ADV) 100 mL  3.375 g IntraVENous Q8H  
 insulin lispro (HUMALOG) injection   SubCUTAneous AC&HS  bumetanide (BUMEX) injection 1 mg  1 mg IntraVENous Q6H PRN  
 fluticasone propionate (FLONASE) 50 mcg/actuation nasal spray 2 Spray  2 Spray Both Nostrils DAILY  sodium chloride (OCEAN) 0.65 % nasal squeeze bottle 2 Spray  2 Spray Both Nostrils QID  alteplase (CATHFLO) 1 mg in dextrose 5% 50 mL impella purge solution  1 mg Other TITRATE  epoetin yvonne-epbx (RETACRIT) injection 10,000 Units  10,000 Units SubCUTAneous Q TUE, THU & SAT  pantoprazole (PROTONIX) tablet 40 mg  40 mg Oral ACB  dextrose 5% infusion  4-20 mL/hr IntraVENous CONTINUOUS  
 [Held by provider] bivalirudin (ANGIOMAX) 250 mg in dextrose 5% 250 mL infusion  4-20 mL/hr Other TITRATE  alteplase (CATHFLO) 1 mg in sterile water (preservative free) 1 mL injection  1 mg InterCATHeter PRN  
 bacitracin 500 unit/gram packet 1 Packet  1 Packet Topical PRN  
 sodium chloride (NS) flush 5-40 mL  5-40 mL IntraVENous Q8H  
 sodium chloride (NS) flush 5-40 mL  5-40 mL IntraVENous PRN  
 0.45% sodium chloride infusion  10 mL/hr IntraVENous CONTINUOUS  
 0.9% sodium chloride infusion  10 mL/hr IntraVENous CONTINUOUS  
 oxyCODONE IR (ROXICODONE) tablet 5 mg  5 mg Oral Q4H PRN  
 oxyCODONE IR (ROXICODONE) tablet 10 mg  10 mg Oral Q4H PRN  
 morphine injection 4 mg  4 mg IntraVENous Q2H PRN  
 naloxone (NARCAN) injection 0.4 mg  0.4 mg IntraVENous PRN  
 ondansetron (ZOFRAN) injection 4 mg  4 mg IntraVENous Q4H PRN  
 albuterol (PROVENTIL VENTOLIN) nebulizer solution 2.5 mg  2.5 mg Nebulization Q4H PRN  chlorhexidine (PERIDEX) 0.12 % mouthwash 10 mL  10 mL Oral Q12H  
 magnesium oxide (MAG-OX) tablet 400 mg  400 mg Oral BID  calcium chloride 1 g in 0.9% sodium chloride 250 mL IVPB  1 g IntraVENous PRN  
 bisacodyl (DULCOLAX) suppository 10 mg  10 mg Rectal DAILY PRN  
  senna-docusate (PERICOLACE) 8.6-50 mg per tablet 1 Tab  1 Tab Oral BID  polyethylene glycol (MIRALAX) packet 17 g  17 g Oral DAILY  ELECTROLYTE REPLACEMENT NOTE: Nurse to review Serum Potassium and Magnesuim levels and Initiate Electrolyte Replacement Protocol as needed  1 Each Other PRN  
 magnesium sulfate 1 g/100 ml IVPB (premix or compounded)  1 g IntraVENous PRN  
 glucose chewable tablet 16 g  4 Tab Oral PRN  
 glucagon (GLUCAGEN) injection 1 mg  1 mg IntraMUSCular PRN  
 dextrose 10% infusion 0-250 mL  0-250 mL IntraVENous PRN  
 morphine injection 2 mg  2 mg IntraVENous Q2H PRN  
 melatonin tablet 3 mg  3 mg Oral QHS PRN  
 albumin human 5% (BUMINATE) solution 25 g  25 g IntraVENous Q2H PRN  
 influenza vaccine 2019-20 (6 mos+)(PF) (FLUARIX/FLULAVAL/FLUZONE QUAD) injection 0.5 mL  0.5 mL IntraMUSCular PRIOR TO DISCHARGE  
 [Held by provider] aspirin delayed-release tablet 81 mg  81 mg Oral DAILY  tamsulosin (FLOMAX) capsule 0.8 mg  0.8 mg Oral DAILY  allopurinol (ZYLOPRIM) tablet 100 mg  100 mg Oral DAILY No Known Allergies Objective: 
Vitals:   
Vitals:  
 11/09/19 0300 11/09/19 0400 11/09/19 0500 11/09/19 0600 BP: 101/80 92/82 107/84 (!) 106/93 Pulse: 70 71 74 71 Resp: 15 15 20 18 Temp:  97.8 °F (36.6 °C) SpO2: 92% 93% 95% 97% Weight:  86.6 kg (190 lb 14.7 oz) Height:      
 
Intake and Output: 
11/08 1901 - 11/09 0700 In: 244.2 [I.V.:244.2] Out: -  
11/07 0701 - 11/08 1900 In: 07349.2 [P.O.:300; I.V.:1398.6] Out: 71285 Physical Examination: 
Pt intubated     No 
General: Awake and alert Neck:  Supple, no mass Resp:  Lungs few dependent rales CV:  RRR,  no murmur or rub, trace b/l LE edema GI:  Soft, NT, + Bowel sounds Neurologic:  AOx3, nonfocal exam 
Psych:             Normal mood and affect Skin:  No Rash :  Lizama w/ blood-tinged urine [x]    High complexity decision making was performed []    Patient is at high-risk of decompensation with multiple organ involvement Lab Data Personally Reviewed: I have reviewed all the pertinent labs, microbiology data and radiology studies during assessment. Recent Labs 11/09/19 0408 11/08/19 0347 11/07/19 
0404 * 136 135* K 4.0 4.1 3.6  100 98 CO2 29 30 33* GLU 79 85 90 BUN 15 17 18 CREA 1.13 1.25 1.19  
CA 8.6 8.7 8.7 MG 2.0 2.1 2.3 ALB 2.3* 2.4* 2.4* SGOT 35 36 40* ALT 28 30 30 INR 1.1 1.1 1.1 Recent Labs 11/09/19 0408 11/08/19 0347 11/07/19 1754 11/07/19 0404 WBC 5.0 4.9  --  4.6 HGB 8.2* 8.5* 8.5* 7.8* HCT 27.0* 27.7* 27.9* 25.4* PLT 98* 97*  --  90* No results found for: SDES Lab Results Component Value Date/Time Culture result: SERRATIA MARCESCENS (A) 10/31/2019 10:05 AM  
 Culture result: SERRATIA MARCESCENS (2ND COLONY TYPE/STRAIN) (A) 10/31/2019 10:05 AM  
 Culture result: ENTEROCOCCUS FAECALIS GROUP D (A) 10/31/2019 10:05 AM  
 Culture result: NO GROWTH 5 DAYS 10/25/2019 07:14 PM  
 Culture result: ENTEROBACTER CLOACAE (A) 06/26/2019 12:25 PM  
 
Recent Results (from the past 24 hour(s)) CEA Collection Time: 11/08/19  2:30 PM  
Result Value Ref Range CEA 6.6 ng/mL GLUCOSE, POC Collection Time: 11/08/19  5:10 PM  
Result Value Ref Range Glucose (POC) 101 (H) 65 - 100 mg/dL Performed by Michelle Berg GLUCOSE, POC Collection Time: 11/08/19  9:09 PM  
Result Value Ref Range Glucose (POC) 120 (H) 65 - 100 mg/dL Performed by Breann Dupont LD Collection Time: 11/09/19  4:08 AM  
Result Value Ref Range  (H) 85 - 241 U/L  
NT-PRO BNP Collection Time: 11/09/19  4:08 AM  
Result Value Ref Range NT pro-BNP 7,103 (H) <125 PG/ML  
LACTIC ACID Collection Time: 11/09/19  4:08 AM  
Result Value Ref Range Lactic acid 0.9 0.4 - 2.0 MMOL/L  
CBC W/O DIFF Collection Time: 11/09/19  4:08 AM  
Result Value Ref Range WBC 5.0 4.1 - 11.1 K/uL  
 RBC 2.82 (L) 4.10 - 5.70 M/uL HGB 8.2 (L) 12.1 - 17.0 g/dL HCT 27.0 (L) 36.6 - 50.3 % MCV 95.7 80.0 - 99.0 FL  
 MCH 29.1 26.0 - 34.0 PG  
 MCHC 30.4 30.0 - 36.5 g/dL RDW 20.6 (H) 11.5 - 14.5 % PLATELET 98 (L) 452 - 400 K/uL MPV 10.4 8.9 - 12.9 FL  
 NRBC 0.0 0  WBC ABSOLUTE NRBC 0.00 0.00 - 0.01 K/uL METABOLIC PANEL, COMPREHENSIVE Collection Time: 11/09/19  4:08 AM  
Result Value Ref Range Sodium 135 (L) 136 - 145 mmol/L Potassium 4.0 3.5 - 5.1 mmol/L Chloride 101 97 - 108 mmol/L  
 CO2 29 21 - 32 mmol/L Anion gap 5 5 - 15 mmol/L Glucose 79 65 - 100 mg/dL BUN 15 6 - 20 MG/DL Creatinine 1.13 0.70 - 1.30 MG/DL  
 BUN/Creatinine ratio 13 12 - 20 GFR est AA >60 >60 ml/min/1.73m2 GFR est non-AA >60 >60 ml/min/1.73m2 Calcium 8.6 8.5 - 10.1 MG/DL Bilirubin, total 1.3 (H) 0.2 - 1.0 MG/DL  
 ALT (SGPT) 28 12 - 78 U/L  
 AST (SGOT) 35 15 - 37 U/L Alk. phosphatase 130 (H) 45 - 117 U/L Protein, total 5.5 (L) 6.4 - 8.2 g/dL Albumin 2.3 (L) 3.5 - 5.0 g/dL Globulin 3.2 2.0 - 4.0 g/dL A-G Ratio 0.7 (L) 1.1 - 2.2 PROTHROMBIN TIME + INR Collection Time: 11/09/19  4:08 AM  
Result Value Ref Range INR 1.1 0.9 - 1.1 Prothrombin time 11.4 (H) 9.0 - 11.1 sec PTT Collection Time: 11/09/19  4:08 AM  
Result Value Ref Range aPTT 27.1 22.1 - 32.0 sec  
 aPTT, therapeutic range     58.0 - 77.0 SECS  
MAGNESIUM Collection Time: 11/09/19  4:08 AM  
Result Value Ref Range Magnesium 2.0 1.6 - 2.4 mg/dL Total time spent with patient:  xxx   min. Care Plan discussed with: 
Patient Family RN Consulting Physician [facility]     
 
I have reviewed the flowsheets. Chart and Pertinent Notes have been reviewed. No change in PMH ,family and social history from Consult note.  
 
 
Michael Llanes MD

## 2019-11-09 NOTE — PROGRESS NOTES
[facility] ICU Progress Note Admit Date: 10/25/2019 POD:  9 Day Post-Op Procedure:  Procedure(s): RIGHT AXILLARY IMPELLA INSERTION Subjective:  
Pt seen with Dr. Kenny Dailey. On room air, sitting up in bed. Afebrile. Impella P8, D5 purge. Lizama in [address] [place] [address] per Urology. Objective:  
Vitals: 
Blood pressure (!) 87/63, pulse 92, temperature 98 °F (36.7 °C), resp. rate 18, height 6' 2\" (1.88 m), weight 190 lb 14.7 oz (86.6 kg), SpO2 97 %. Temp (24hrs), Av.1 °F (36.7 °C), Min:97.8 °F (36.6 °C), Max:98.5 °F (36.9 °C) Hemodynamics: 
 CO: CO (l/min): 8.3 l/min CI: CI (l/min/m2): 4 l/min/m2 CVP: CVP (mmHg): 7 mmHg (19) SVR: SVR (dyne*sec)/cm5: 781 (dyne*sec)/cm5 (19 1500) PAP Systolic: PAP Systolic: 45 (85/88/00 5518) PAP Diastolic: PAP Diastolic: 19 (59/39/21 6064) PVR:   
 SV02: SVO2 (%): 51 % (19 1500) SCV02: SCVO2 (%): 75 % (10/29/19 1900) EKG/Rhythm:   
Paced in the 90s Oxygen Therapy: 
Oxygen Therapy O2 Sat (%): 97 % (19 09) Pulse via Oximetry: 83 beats per minute (19 1500) O2 Device: Room air (19) O2 Flow Rate (L/min): 4 l/min (19 0400) FIO2 (%): 40 % (10/30/19 0846) CXR:  
CXR Results  (Last 48 hours) 19 0447  XR CHEST PORT Final result Impression:  IMPRESSION: No significant change. Narrative:  EXAM:  XR CHEST PORT. INDICATION: Postop heart. COMPARISON: 2019. FINDINGS:   
A portable AP radiograph of the chest was obtained at 0359 hours. The  
positioning is lordotic There is a pacemaker in the left chest, unchanged in  
position. The right subclavian Impella device is also unchanged. Lines and tubes: The patient is on a cardiac monitor. There is a right arm PICC  
line which is unchanged in position. Lungs: There is mild interstitial edema throughout the lungs, unchanged. Pleura: There is no pneumothorax or pleural effusion. Mediastinum: The cardiac silhouette is borderline enlarged. There are  
mediastinal clips. Bones and soft tissues: There are surgical clips and skin staples in the right  
infraclavicular region. 11/08/19 0507  XR CHEST PORT Final result Impression:  IMPRESSION: No significant change. Narrative:  INDICATION:  postop heart. Heart failure EXAM: CXR Portable. FINDINGS: Portable chest shows support lines/devices show no significant change  
since yesterday. There is no apparent pneumothorax. Lungs show no  
consolidation. Heart size is stable. There is likely mild interstitial pulmonary  
edema. Admission Weight: Last Weight Weight: 192 lb 10.9 oz (87.4 kg) Weight: 190 lb 14.7 oz (86.6 kg) Intake / Output / Drain: 
Current Shift: No intake/output data recorded. Last 24 hrs.:  
 
Intake/Output Summary (Last 24 hours) at 11/9/2019 1048 Last data filed at 11/9/2019 0700 Gross per 24 hour Intake 6895.4 ml Output 78008 ml Net -3454.6 ml EXAM: 
General:   NAD. Up in the chair Lungs:   Diminished in the bases. Incision:  Impella dressing C/D/I Heart:  Regular rate and rhythm, S1, S2 normal, no murmur, click, rub or gallop. Abdomen:   Soft, non-tender. Bowel sounds hypoactive. No masses,  No organomegaly. Lizama with gross hematuria Extremities:  +1 bilateral lower extremity pitting edema. PPP. Neurologic:  Gross motor and sensory apparatus intact. Labs:  
Recent Labs 11/09/19 
0703 11/09/19 
0408 WBC  --  5.0 HGB  --  8.2* HCT  --  27.0*  
PLT  --  98* NA  --  135* K  --  4.0  
BUN  --  15  
CREA  --  1.13  
GLU  --  79  
GLUCPOC 89  --   
INR  --  1.1 Assessment:  
 
Active Problems: 
  Acute decompensated heart failure ([facility] [address] (10/25/2019) Plan/Recommendations/Medical Decision Making: 1. Acute on chronic systolic (CHF Class IV) S/P Impella: Has ICD. LV EF 16-20%. No BB/ACE/ARB/AA until appropriate. Bumex 1 mg IV QID-PRN. Trend proBNP, lactate, LDH. Strict I/Os. Daily weights. LVAD w/u in process. Cefepime changed to zosyn per ID for prophylaxis. Continue D5 purge due to hematuria/epistaxis 2. Thrombocytopenia: Platelets improving at 98K. No asa. HIT negative, LOAN negative. On protonix. Heme following. 3. CAD S/p CABG 2010: Needs LHC, timing TBD, once UTI has cleared. Stop asa d/t hematuria/thrombocytopenia, not on statin historically. No BB D/t pressors/intropes. 4. PAF s/p DCCV 6/2019: Holding eliquis due to hematuria/epistaxis. On PO amio. 5. JUAN J on CKD stage IV: Monitor. Renal following. Diuretics PRN CVP >10. Keep neal. 6. Hx of urinary retention/BPH, hematuria:  On flomax. Neal with CBI per Urology but will likely wean off. Urology following. 7. JOSE: qhs CPAP. 8. Hx of sternal wound infection requring sternectomy: Not a contraindication for future LVAD. Tagged WBC -- showed no abnormal tracer uptake. 9. Iron def anemia: s/p venofer. H&H stable today. On Epogen. Cannot use doxy/octreotide d/t prolonged QTC. Occult blood in stool positive per POC testing. Gastroenterology following for LVAD work up. 10. Vocal cord paralysis: Voice improving. Speech eval PRN. Advance diet as tolerated. 11. Constipation: Resolved last BM 11/6. Continue pericolace, miralax(pt keeps refusing). Continue daily suppository PRN. 12. Serratia UTI/hematuria: Urology following planning to transition off CBI. On Zosyn-ID following. 13. Mediastinal lymphadenopathy:  Per AHF, low threshold for Carcinoma per CT scans. Eventually needs PET scan. 14. Hyponatremia: Resolved-monitor 15. Acute Moderate protein-calorie malnutrition:  Pre-albumin 10/25 was 13.9. Recheck 11/4 was 16.2. Pt eating cardiac diet well. Continue marinol Dispo: Care and orders per AHFS. Remain in CCU.  
 
Signed By: Mustapha Evangelista NP

## 2019-11-09 NOTE — PROGRESS NOTES
Bedside and Verbal shift change report given to Kiah (oncoming nurse) by Russel Jensen (offgoing nurse). Report included the following information SBAR, Kardex, Intake/Output, MAR, Accordion, Recent Results, Cardiac Rhythm -Paced and Alarm Parameters . 2000 - Assumed care. 2300 - Purge flow down to 3.0, TPA given per protocol. 0300 - AM labs drawn. 0400 - AM CXR obtained. 0600 - Dr. Cristal Fallon at bedside, update given. Bedside and Verbal shift change report given to Russel Jensen (oncoming nurse) by Nile Reina (offgoing nurse). Report included the following information SBAR, Kardex, Intake/Output, MAR, Accordion, Recent Results, Cardiac Rhythm -Paced and Alarm Parameters .

## 2019-11-09 NOTE — PROGRESS NOTES
NYHA class IV A/C systolic heart failure Low EF (10%) Inotrope dependent Malnutrition  
LVAD Work-up Epistaxis Hematuria Hematuria improving Eating better Working on PT/OT Still needs colonoscopy Still needs LHC Still holding bival 
 
LE edema improved Creatinine normal 
 
Bilirubin and other LFTs look reasonable Pro-calcitonin normal 
 
NT pro-BNP about the same Discussed with HF team  
 
Impella - flow 4 L/min @ P-8 Intake/Output Summary (Last 24 hours) at 11/9/2019 1425 Last data filed at 11/9/2019 0700 Gross per 24 hour Intake 6512.7 ml Output 8400 ml Net -1887.3 ml Visit Vitals BP (!) 87/63 Pulse 92 Temp 98 °F (36.7 °C) Resp 18 Ht 6' 2\" (1.88 m) Wt 190 lb 14.7 oz (86.6 kg) SpO2 97% BMI 24.51 kg/m² Risk of morbidity and mortality - high Medical decision making - high complexity Total critical care time - 30 minutes (CPT 50310)

## 2019-11-09 NOTE — PROGRESS NOTES
NYHA class IV A/C systolic heart failure Low EF (10%) Inotrope dependent Malnutrition  
LVAD Work-up Epistaxis Hematuria Still some issues with hematuria and mild epistaxis Feels better overall LE edema today - will get CAROL hose on Hgb a little low - getting pRBC Platelets reasonable Needs LHC and colonscopy Creatinine normal 
 
Bilirubin and other LFTs look good Pro-calcitonin normal 
 
LDH and lactic acid reasonable CXR - mild edema Impella - flow 4 L/min @ P-8 Intake/Output Summary (Last 24 hours) at 11/9/2019 1428 Last data filed at 11/9/2019 0700 Gross per 24 hour Intake 6512.7 ml Output 8400 ml Net -1887.3 ml Visit Vitals BP (!) 87/63 Pulse 92 Temp 98 °F (36.7 °C) Resp 18 Ht 6' 2\" (1.88 m) Wt 190 lb 14.7 oz (86.6 kg) SpO2 97% BMI 24.51 kg/m² Risk of morbidity and mortality - high Medical decision making - high complexity Total critical care time - 30 minutes (CPT 68291)

## 2019-11-09 NOTE — PROGRESS NOTES
0730 Bedside shift change report given to Hayde Raymond (oncoming nurse) by [facility] (offgoing nurse). Report included the following information SBAR, Kardex, Procedure Summary, Intake/Output, MAR, Recent Results, Cardiac Rhythm Paced and Alarm Parameters . 1400 Walking with PT 
1500 LVAD coordinator with pt  
 
1930 Bedside shift change report given to Kiah (oncoming nurse) by Hayde Raymond (offgoing nurse). Report included the following information SBAR, Kardex, Procedure Summary, Intake/Output, MAR, Recent Results and Cardiac Rhythm Paced.

## 2019-11-09 NOTE — PROGRESS NOTES
07:30 - 12:15 (285) NYHA class IV A/C systolic heart failure Low EF (10%) Inotrope dependent Malnutrition  
LVAD Work-up Epistaxis Hematuria Pre-Op Plan (11/18/19 implant) : 
1) Platelets a little low - will need to be above 150 before surgery 2) PA catheter Thursday evening 3) Hold bivalirudin at midnight on Sunday (switch to D5) 4) Start Vanc when we get in the room 5) Make sure bend relief lock is in place 6) Start drips after we finish the inflow cannula 7) Needs colonoscopy 8) Needs [facility] Op Plan: 
1) Will not need sternal saw - can go right to incision and dissection (need ANDREA bar's) 2) Dissection: A. The RV rises above level of posterior sternal plate inferiorly (start high) B. The innominate vein is unusually low (can feel for the wires) C. Will likely need to incorporate proximal CABG anastomosis in side-biter 
      for outflow graft D. LIMA to LAD graft is fairly lateral and should be out of the way E. Will need to get aorta and LV apex out as usual 
 F. Place drive-line (somewhat thin adipose tissue) 3) Cannulation (give heparin): A. Left groin 25 Fr venous (will save the right in case we need RVAD) B. Build 10 mm graft side-arm off Impella graft (glue; will need to extend  
      axiallary graft back); have 3/8 connector already attached 4) Implant: A. Remove Impella, de-clot graft (#4 Shakila), back-flush, hook up to bypass  
     circuit, go on bypass (place extra sucker in axillary wound B. Packs, find apex, and core (remove trabeculations; suture in CO2 and  
      pump sucker lines) C. Place (4) 2-0 Ethibond sutures, bring them through inflow cannula sewing  
      ring, and tie down D. Place (2) 4-0 SH running sutures, bioglue E. Attach pump, remove packs, and lower into well F. Clamp outflow graft, turn on drips, and turn on pump at 3000 
 G. Measure outflow graft (add 1 cm), side-biter, aortotomy, and outflow graft  
      anastomosis H. Place de-airing stitch and remove clamps  
 I. Come off bypass and up on LVAD as usual 
 J. Staple off axillary graft Chest / Abdominal CT scan:  
 
Sternum is  although it looks like he still has sternal bone; the right half is higher than the left half and will need to some down; should be able to close with 12 standard wires although the sternum is thin; back-up plan would be a weave RV is coming above the sternal edge somewhat at the inferior margin so need to be careful here Proximal comes off anterior portion of the aorta; may need to place side-biter such that it incorporates the proximal; needs a [facility] to figure out which way the graft is going; no significant calcium in the ascending aorta; Impella in place LIMA to LAD graft is fairly lateral so should be out of the way for dissection Right atrium looks a little big Innominate vein is a little low so need to be carefull with dissection (should be able to feel the pace leads) Bilateral common iliac arteries are severely calcified and narrowed down into the 4 mm range; will be somewhat hazardous trying to place femoral arterial cannula; will be best to extend out impella graft, build 10 mm side-arm, and go on through the axillary artery; will need to flush out the graft after we pull the Impella and before going on bypass (timing will be important here); will need to place the femoral venous line first 
 
Abdominal wall adipose tissue is thin so need to be careful with drive-line LHC - pending TTE - severely dilated LV cavity (7.36 cm) with reduced EF (10%); mild MR, large left atrium; previous TTE's did not reveal any AI; has moderate RV dysfunction (RVIDD 5.4, RV/LV ratio 0.73, TAPSE 1.4, mild TR; good free wall motion); little worried that sternal closure with compress his RV so will be prepare for temp RVAD support Carotids - normal 
 
ABIs - normal  
 
PFT's - FEV-1 2.25 (59% predicted) Epistaxis - resolved Hematuria - resolved Hgb 8.2, platelets 98, INR 1.1 BUN 15, creatinine 1.13 Bilirubin 1.3, ALT 28, AST 35, alk phos 130 LDH - 438, lactic acid 0.9 NT pro-BNP - 7103 CEA - 6.6 (mildy elevated) CXR - mild pulmonary edema Impella - flow 3 L/min @ P-6 Intake/Output Summary (Last 24 hours) at 11/9/2019 1144 Last data filed at 11/9/2019 0700 Gross per 24 hour Intake 6874 ml Output 80330 ml Net -3476 ml Visit Vitals BP (!) 87/63 Pulse 92 Temp 98 °F (36.7 °C) Resp 18 Ht 6' 2\" (1.88 m) Wt 190 lb 14.7 oz (86.6 kg) SpO2 97% BMI 24.51 kg/m² Risk of morbidity and mortality - high Medical decision making - high complexity Total critical care time - 285 minutes (CPT 81288, 99292 x 8)

## 2019-11-09 NOTE — PROGRESS NOTES
Problem: Falls - Risk of 
Goal: *Absence of Falls Description Document Joshua Rao Fall Risk and appropriate interventions in the flowsheet. Outcome: Progressing Towards Goal 
Note:  
Fall Risk Interventions: 
Mobility Interventions: Communicate number of staff needed for ambulation/transfer, Patient to call before getting OOB, PT Consult for mobility concerns Mentation Interventions: Adequate sleep, hydration, pain control, Door open when patient unattended, Evaluate medications/consider consulting pharmacy, Toileting rounds Medication Interventions: Assess postural VS orthostatic hypotension, Evaluate medications/consider consulting pharmacy, Patient to call before getting OOB, Teach patient to arise slowly Elimination Interventions: Call light in reach, Patient to call for help with toileting needs, Toileting schedule/hourly rounds History of Falls Interventions: Door open when patient unattended, Evaluate medications/consider consulting pharmacy, Investigate reason for fall, Room close to nurse's station Problem: Patient Education: Go to Patient Education Activity Goal: Patient/Family Education Outcome: Progressing Towards Goal 
  
Problem: Pain Goal: *Control of Pain Outcome: Progressing Towards Goal 
Goal: *PALLIATIVE CARE:  Alleviation of Pain Outcome: Progressing Towards Goal 
  
Problem: Patient Education: Go to Patient Education Activity Goal: Patient/Family Education Outcome: Progressing Towards Goal 
  
Problem: Pressure Injury - Risk of 
Goal: *Prevention of pressure injury Description Document Mich Scale and appropriate interventions in the flowsheet. Outcome: Progressing Towards Goal 
Note:  
Pressure Injury Interventions: 
Sensory Interventions: Keep linens dry and wrinkle-free, Maintain/enhance activity level, Minimize linen layers, Pad between skin to skin, Pressure redistribution bed/mattress (bed type), Turn and reposition approx.  every two hours (pillows and wedges if needed) Moisture Interventions: Absorbent underpads, Maintain skin hydration (lotion/cream), Minimize layers, Moisture barrier Activity Interventions: Increase time out of bed, Pressure redistribution bed/mattress(bed type), PT/OT evaluation Mobility Interventions: Assess need for specialty bed, HOB 30 degrees or less, Pressure redistribution bed/mattress (bed type), Turn and reposition approx. every two hours(pillow and wedges), Float heels, PT/OT evaluation Nutrition Interventions: Document food/fluid/supplement intake, Discuss nutritional consult with provider Friction and Shear Interventions: Apply protective barrier, creams and emollients, HOB 30 degrees or less, Lift sheet, Minimize layers, Transferring/repositioning devices Problem: Patient Education: Go to Patient Education Activity Goal: Patient/Family Education Outcome: Progressing Towards Goal 
  
Problem: Infection - Risk of, Multi-drug Resistant Organism Colonization (MDRO) Goal: *Absence of MDRO colonization Outcome: Progressing Towards Goal 
Goal: *Absence of infection signs and symptoms Outcome: Progressing Towards Goal 
  
Problem: Patient Education: Go to Patient Education Activity Goal: Patient/Family Education Outcome: Progressing Towards Goal 
  
Problem: Patient Education: Go to Patient Education Activity Goal: Patient/Family Education Outcome: Progressing Towards Goal 
  
Problem: Heart Failure: Day 5 Goal: Off Pathway (Use only if patient is Off Pathway) Outcome: Progressing Towards Goal 
Goal: Activity/Safety Outcome: Progressing Towards Goal 
Goal: Diagnostic Test/Procedures Outcome: Progressing Towards Goal 
Goal: Nutrition/Diet Outcome: Progressing Towards Goal 
Goal: Discharge Planning Outcome: Progressing Towards Goal 
Goal: Medications Outcome: Progressing Towards Goal 
Goal: Respiratory Outcome: Progressing Towards Goal 
 Goal: Treatments/Interventions/Procedures Outcome: Progressing Towards Goal 
Goal: Psychosocial 
Outcome: Progressing Towards Goal 
  
Problem: Heart Failure: Discharge Outcomes Goal: *Demonstrates ability to perform prescribed activity without shortness of breath or discomfort Outcome: Progressing Towards Goal 
Goal: *Left ventricular function assessment completed prior to or during stay, or planned for post-discharge Outcome: Progressing Towards Goal 
Goal: *ACEI prescribed if LVEF less than 40% and no contraindications or ARB prescribed Outcome: Progressing Towards Goal 
Goal: *Verbalizes understanding and describes prescribed diet Outcome: Progressing Towards Goal 
Goal: *Verbalizes understanding/describes prescribed medications Outcome: Progressing Towards Goal 
Goal: *Describes available resources and support systems Description 
(eg: Home Health, Palliative Care, Advanced Medical Directive) Outcome: Progressing Towards Goal 
Goal: *Describes smoking cessation resources Outcome: Progressing Towards Goal 
Goal: *Understands and describes signs and symptoms to report to providers(Stroke Metric) Outcome: Progressing Towards Goal 
Goal: *Describes/verbalizes understanding of follow-up/return appt Description 
(eg: to physicians, diabetes treatment coordinator, and other resources Outcome: Progressing Towards Goal 
Goal: *Describes importance of continuing daily weights and changes to report to physician Outcome: Progressing Towards Goal 
  
Problem: Diabetes Self-Management Goal: *Disease process and treatment process Description Define diabetes and identify own type of diabetes; list 3 options for treating diabetes. Outcome: Progressing Towards Goal 
Goal: *Incorporating nutritional management into lifestyle Description Describe effect of type, amount and timing of food on blood glucose; list 3 methods for planning meals.  
Outcome: Progressing Towards Goal 
 Goal: *Incorporating physical activity into lifestyle Description State effect of exercise on blood glucose levels. Outcome: Progressing Towards Goal 
Goal: *Developing strategies to promote health/change behavior Description Define the ABC's of diabetes; identify appropriate screenings, schedule and personal plan for screenings. Outcome: Progressing Towards Goal 
Goal: *Using medications safely Description State effect of diabetes medications on diabetes; name diabetes medication taking, action and side effects. Outcome: Progressing Towards Goal 
Goal: *Monitoring blood glucose, interpreting and using results Description Identify recommended blood glucose targets  and personal targets. Outcome: Progressing Towards Goal 
Goal: *Prevention, detection, treatment of acute complications Description List symptoms of hyper- and hypoglycemia; describe how to treat low blood sugar and actions for lowering  high blood glucose level. Outcome: Progressing Towards Goal 
Goal: *Prevention, detection and treatment of chronic complications Description Define the natural course of diabetes and describe the relationship of blood glucose levels to long term complications of diabetes. Outcome: Progressing Towards Goal 
Goal: *Developing strategies to address psychosocial issues Description Describe feelings about living with diabetes; identify support needed and support network Outcome: Progressing Towards Goal 
Goal: *Insulin pump training Outcome: Progressing Towards Goal 
Goal: *Sick day guidelines Outcome: Progressing Towards Goal 
Goal: *Patient Specific Goal (EDIT GOAL, INSERT TEXT) Outcome: Progressing Towards Goal 
  
Problem: Patient Education: Go to Patient Education Activity Goal: Patient/Family Education Outcome: Progressing Towards Goal 
  
Problem: Patient Education: Go to Patient Education Activity Goal: Patient/Family Education Outcome: Progressing Towards Goal 
  
 Problem: Discharge Planning Goal: *Discharge to safe environment Outcome: Progressing Towards Goal 
  
Problem: Patient Education: Go to Patient Education Activity Goal: Patient/Family Education Outcome: Progressing Towards Goal 
  
Problem: Infection - Risk of, Surgical Site Infection Goal: *Absence of surgical site infection signs and symptoms Outcome: Progressing Towards Goal 
  
Problem: Patient Education: Go to Patient Education Activity Goal: Patient/Family Education Outcome: Progressing Towards Goal 
  
Problem: Infection - Risk of, Surgical Site Infection Goal: *Absence of surgical site infection signs and symptoms Outcome: Progressing Towards Goal 
  
Problem: Patient Education: Go to Patient Education Activity Goal: Patient/Family Education Outcome: Progressing Towards Goal 
  
Problem: Patient Education: Go to Patient Education Activity Goal: Patient/Family Education Outcome: Progressing Towards Goal 
  
Problem: Nutrition Deficit Goal: *Optimize nutritional status Outcome: Progressing Towards Goal 
  
Problem: Nutrition Deficit Goal: *Optimize nutritional status Outcome: Progressing Towards Goal 
  
Problem: Nutrition Deficit Goal: *Optimize nutritional status Outcome: Progressing Towards Goal

## 2019-11-09 NOTE — PROGRESS NOTES
[address] [facility] [address] [facility] in [place], [place] Inpatient Progress Note Patient name: Cornel Silverio Patient : 1950 Patient MRN: [id number] Attending MD: Fred Titus MD 
Date of service: 19 CHIEF COMPLAINT: 
Cardiogenic shock 
  
PLAN: 
Continue current impella speed at P8; cannot tolerate lower speeds Continue bumex 1mg IV every 6 hours PRN for CVP > 10; renal consult appreciated Continue CAROL hose to mobilize LE fluid Transduce CVP off PICC No ACE/ARB/ARNi in anticipation of surgery No AA due to hyponatremia No tolvaptan due to hepatic dysfuction Continue small dose amiodarone for PAF Anemia and thrombocytopenia likely due to hematuria and hemolysis Urinary retention and Serratia UTI with hematuria, on antibiotics by ID - ?length of therapy - need to determine timing of LVAD implant Urology consult appreciated, continue bladder irrigation Request cystoscopy to evaluate for bladder carcinoma prior to LVAD implant Anticoagulation and ASA on hold due to hematuria and epistaxis Cannot use octeotride or doxycycline for epistaxis/AVM prophylaxis due to QTc 502 ms Nutritionist consult, prealbumins weekly Continue Marinol and 6 small meals daily- diet adjusted Pulmonary consult appreciated - repeat chest CT showing again mild lymphadenopathy, no mass/nodules Unable to perform DLCO while on impella support PFTs unchanged post bronchodilator therapy, but adequate for LVAD consideration Colonoscopy, EGD scheduled for Monday at 12:30 at the bedside Delay [facility] until UTI clears and GI evaluation completed to r/o CA - tentatively requested for  Unble to have PET scan due to dextrose required for Impella infusion Ongoing PT/OT/VAD education Tentatively planned for LVAD implant  - DT letter requested from [facility] IMPRESSION: 
Cardiogenic shock S/p Impella 5.0 implant 10/29/19 by Dr. Fany Heart Acute on chronic systolic heart failure Stage D, NYHA class IV symptoms Non-ischemic cardiomyopathy, LVEF 15%, LVEDD 5.7cm Hyponatremia Liver dysfunction Thrombocytopenia H/o VF arrest s/p ICD 
CAD s/p CABG 2010 C/b sternal wound infection requiring sternectomy CKD PAF s/p DCCV 61/8/19 UTI with GNR Anemia, iron deficiency Cardiac cachexia Vocal cord paralysis Epistaxis Hematuria 
  
CARDIAC IMAGING: 
Tagged WBC negative 
  INTERVAL HISTORY: 
Feels well today Hgb stable CBI continues, hematuria improving MAPS at goal 
CVP around 6 Eating well PHYSICAL EXAM: 
Visit Vitals BP (!) 106/93 Pulse 71 Temp 97.8 °F (36.6 °C) Resp 18 Ht 6' 2\" (1.88 m) Wt 190 lb 14.7 oz (86.6 kg) SpO2 97% BMI 24.51 kg/m² Impella (5.0) Performance Level (P Level): 8 Flow Rate L/min (0-2.5): 4.5 L/min Placement Signal (mmHg): Differential 5.0 (s/d 30-60/0 ex) Placement Signal (Systolic/Diastolic): (11/-74) Motor Current (amp): (normal) Motor Current (Systolic/Diastolic): 038/578 Placement Monitoring: OK Purge Pressure (300-1100 mm Hg): 477 Purge Flow (ml/hr): 8.2 Sidearm Pressure Bag @ 300 mmHg/ flushed (Na with 5.0 Impella): No 
Power (AC/Battery): Yes Impella Pump Serial Number: [id number] Hemodynamics: 
 CVP: CVP (mmHg): 9 mmHg (11/09/19 0600) Review of Systems Constitutional: Negative for chills, fever and malaise/fatigue. HENT: Positive for hearing loss. Respiratory: Negative for cough and shortness of breath. Cardiovascular: Negative for chest pain, palpitations, leg swelling and PND. Gastrointestinal: Negative for heartburn, nausea and vomiting. Appetite greatly improved Musculoskeletal: Negative. Neurological: Negative for dizziness, focal weakness and headaches. Psychiatric/Behavioral: Negative. Physical Exam  
Constitutional: He is oriented to person, place, and time and well-developed, well-nourished, and in no distress. No distress. HENT:  
Head: Normocephalic. Neck: Normal range of motion. Neck supple. No JVD present. Cardiovascular: Normal rate, regular rhythm, normal heart sounds and intact distal pulses. Pulmonary/Chest: Effort normal and breath sounds normal. No respiratory distress. Abdominal: Soft. Bowel sounds are normal. He exhibits no distension. Musculoskeletal: Normal range of motion. He exhibits no edema. Neurological: He is alert and oriented to person, place, and time. Skin: Skin is warm and dry. No erythema. Psychiatric: Affect and judgment normal.  
Nursing note and vitals reviewed. PAST MEDICAL HISTORY: 
Past Medical History:  
Diagnosis Date  Degenerative disc disease, lumbar  Heart failure ([facility] [address]  High cholesterol  Hypertension  Paroxysmal atrial fibrillation ([facility] [address] 4/2/2019  Spinal stenosis PAST SURGICAL HISTORY: 
Past Surgical History:  
Procedure Laterality Date  HX APPENDECTOMY  HX CORONARY ARTERY BYPASS GRAFT    
 triple  HX HERNIA REPAIR    
 HX IMPLANTABLE CARDIOVERTER DEFIBRILLATOR  LA CARDIOVERSION ELECTIVE ARRHYTHMIA EXTERNAL N/A 6/10/2019 EP CARDIOVERSION performed by Hilda Carey MD at [place] [address] CATH LAB  LA CARDIOVERSION ELECTIVE ARRHYTHMIA EXTERNAL N/A 6/18/2019 EP CARDIOVERSION performed by Monika Ibarra MD at [place] [address], [address] CATH LAB  LA INSJ/RPLCMT PERM DFB W/TRNSVNS LDS 1/DUAL CHMBR N/A 6/21/2019 INSERT ICD BIV MULTI performed by Jv Morris MD at [place] [address], [address] CATH LAB  LA TCAT IMPL WRLS P-ART PRS SNR L-T HEMODYN MNTR N/A 9/18/2019 IMPLANT HEART FAILURE MONITORING DEVICE performed by Refugio Melissa MD at [place] [address], [address] CATH LAB FAMILY HISTORY: 
Family History Problem Relation Age of Onset  Lung Disease Mother  Hypertension Mother Marcio Jose Arthritis-osteo Mother  Heart Disease Mother  Heart Disease Father  Diabetes Father SOCIAL HISTORY: 
Social History Socioeconomic History  Marital status:  Spouse name: Not on file  Number of children: Not on file  Years of education: Not on file  Highest education level: Not on file Tobacco Use  Smoking status: Former Smoker Last attempt to quit: 2010 Years since quittin.9  Smokeless tobacco: Never Used Substance and Sexual Activity  Alcohol use: Not Currently Comment: rarely  Drug use: Never Other Topics Concern LABORATORY RESULTS: 
  
Labs Latest Ref Rng & Units 2019 WBC 4.1 - 11.1 K/uL 5.0 4.9 - 4.6 4.9 5.3 5.5  
RBC 4.10 - 5.70 M/uL 2.82(L) 2.89(L) - 2.69(L) 2.76(L) 2.83(L) 3.03(L) Hemoglobin 12.1 - 17.0 g/dL 8. 2(L) 8.5(L) 8.5(L) 7. 8(L) 8.0(L) 8. 3(L) 8.8(L) Hematocrit 36.6 - 50.3 % 27. 0(L) 27. 7(L) 27. 9(L) 25. 4(L) 26. 0(L) 26. 4(L) 28. 1(L) MCV 80.0 - 99.0 FL 95.7 95.8 - 94.4 94.2 93.3 92.7 Platelets 852 - 007 K/uL 98(L) 97(L) - 90(L) 84(L) 80(L) 86(L) Lymphocytes 12 - 49 % - - - - - - - Monocytes 5 - 13 % - - - - - - - Eosinophils 0 - 7 % - - - - - - - Basophils 0 - 1 % - - - - - - - Albumin 3.5 - 5.0 g/dL 2. 3(L) 2. 4(L) - 2. 4(L) 2. 3(L) 2. 4(L) 2. 6(L) Calcium 8.5 - 10.1 MG/DL 8.6 8.7 - 8.7 8.5 8.5 8.7 SGOT 15 - 37 U/L 35 36 - 40(H) 42(H) 50(H) 50(H) Glucose 65 - 100 mg/dL 79 85 - 90 83 90 92 BUN 6 - 20 MG/DL 15 17 - 18 18 25(H) 25(H) Creatinine 0.70 - 1.30 MG/DL 1.13 1.25 - 1.19 1.03 1.07 1.19 Sodium 136 - 145 mmol/L 135(L) 136 - 135(L) 132(L) 128(L) 130(L) Potassium 3.5 - 5.1 mmol/L 4.0 4.1 - 3.6 3.9 4.3 4.2 TSH 0.36 - 3.74 uIU/mL - - - - - - -  
PSA 0.01 - 4.0 ng/mL - - - - - - -  
LDH 85 - 241 U/L 438(H) 439(H) - 458(H) 436(H) 482(H) 458(H) CEA ng/mL - 6.6 - - - - - Some recent data might be hidden Lab Results Component Value Date/Time TSH 2.40 10/25/2019 07:39 PM  
 TSH 2.45 2019 04:16 AM  
 
 
ALLERGY: 
No Known Allergies CURRENT MEDICATIONS: 
 Current Facility-Administered Medications Medication Dose Route Frequency  [START ON 11/10/2019] peg 3350-electrolytes (COLYTE) 4000 mL  4,000 mL Oral ONCE  
 0.9% sodium chloride infusion 250 mL  250 mL IntraVENous PRN  
 amiodarone (CORDARONE) tablet 100 mg  100 mg Oral DAILY  dronabinol (MARINOL) capsule 2.5 mg  2.5 mg Oral ACB&D  piperacillin-tazobactam (ZOSYN) 3.375 g in 0.9% sodium chloride (MBP/ADV) 100 mL  3.375 g IntraVENous Q8H  
 insulin lispro (HUMALOG) injection   SubCUTAneous AC&HS  bumetanide (BUMEX) injection 1 mg  1 mg IntraVENous Q6H PRN  
 fluticasone propionate (FLONASE) 50 mcg/actuation nasal spray 2 Spray  2 Spray Both Nostrils DAILY  sodium chloride (OCEAN) 0.65 % nasal squeeze bottle 2 Spray  2 Spray Both Nostrils QID  alteplase (CATHFLO) 1 mg in dextrose 5% 50 mL impella purge solution  1 mg Other TITRATE  epoetin yvonne-epbx (RETACRIT) injection 10,000 Units  10,000 Units SubCUTAneous Q TUE, THU & SAT  pantoprazole (PROTONIX) tablet 40 mg  40 mg Oral ACB  dextrose 5% infusion  4-20 mL/hr IntraVENous CONTINUOUS  
 [Held by provider] bivalirudin (ANGIOMAX) 250 mg in dextrose 5% 250 mL infusion  4-20 mL/hr Other TITRATE  alteplase (CATHFLO) 1 mg in sterile water (preservative free) 1 mL injection  1 mg InterCATHeter PRN  
 bacitracin 500 unit/gram packet 1 Packet  1 Packet Topical PRN  
 sodium chloride (NS) flush 5-40 mL  5-40 mL IntraVENous Q8H  
 sodium chloride (NS) flush 5-40 mL  5-40 mL IntraVENous PRN  
 0.45% sodium chloride infusion  10 mL/hr IntraVENous CONTINUOUS  
 0.9% sodium chloride infusion  10 mL/hr IntraVENous CONTINUOUS  
 oxyCODONE IR (ROXICODONE) tablet 5 mg  5 mg Oral Q4H PRN  
 oxyCODONE IR (ROXICODONE) tablet 10 mg  10 mg Oral Q4H PRN  
 morphine injection 4 mg  4 mg IntraVENous Q2H PRN  
 naloxone (NARCAN) injection 0.4 mg  0.4 mg IntraVENous PRN  
 ondansetron (ZOFRAN) injection 4 mg  4 mg IntraVENous Q4H PRN  
  albuterol (PROVENTIL VENTOLIN) nebulizer solution 2.5 mg  2.5 mg Nebulization Q4H PRN  chlorhexidine (PERIDEX) 0.12 % mouthwash 10 mL  10 mL Oral Q12H  
 magnesium oxide (MAG-OX) tablet 400 mg  400 mg Oral BID  calcium chloride 1 g in 0.9% sodium chloride 250 mL IVPB  1 g IntraVENous PRN  
 bisacodyl (DULCOLAX) suppository 10 mg  10 mg Rectal DAILY PRN  
 senna-docusate (PERICOLACE) 8.6-50 mg per tablet 1 Tab  1 Tab Oral BID  polyethylene glycol (MIRALAX) packet 17 g  17 g Oral DAILY  ELECTROLYTE REPLACEMENT NOTE: Nurse to review Serum Potassium and Magnesuim levels and Initiate Electrolyte Replacement Protocol as needed  1 Each Other PRN  
 magnesium sulfate 1 g/100 ml IVPB (premix or compounded)  1 g IntraVENous PRN  
 glucose chewable tablet 16 g  4 Tab Oral PRN  
 glucagon (GLUCAGEN) injection 1 mg  1 mg IntraMUSCular PRN  
 dextrose 10% infusion 0-250 mL  0-250 mL IntraVENous PRN  
 morphine injection 2 mg  2 mg IntraVENous Q2H PRN  
 melatonin tablet 3 mg  3 mg Oral QHS PRN  
 albumin human 5% (BUMINATE) solution 25 g  25 g IntraVENous Q2H PRN  
 influenza vaccine 2019-20 (6 mos+)(PF) (FLUARIX/FLULAVAL/FLUZONE QUAD) injection 0.5 mL  0.5 mL IntraMUSCular PRIOR TO DISCHARGE  
 [Held by provider] aspirin delayed-release tablet 81 mg  81 mg Oral DAILY  tamsulosin (FLOMAX) capsule 0.8 mg  0.8 mg Oral DAILY  allopurinol (ZYLOPRIM) tablet 100 mg  100 mg Oral DAILY Thank you for allowing me to participate in this patient's care. TIERRA Gomez [address], [address] Phone: [phone] Fax: [phone]

## 2019-11-10 NOTE — PROGRESS NOTES
NYHA class IV A/C systolic heart failure Low EF (10%) Inotrope dependent Malnutrition  
LVAD Work-up Epistaxis Hematuria 
  
Pre-Op Plan (11/18/19 implant) : 
1) Platelets a little low - will need to be above 150 before surgery 2) PA catheter Thursday evening 3) Hold bivalirudin at midnight on Sunday (switch to D5) 4) Start Vanc when we get in the room 5) Make sure bend relief lock is in place 6) Start drips after we finish the inflow cannula 7) Needs colonoscopy 8) Needs [facility]  
  
  
Op Plan: 
1) Will not need sternal saw - can go right to incision and dissection (need ANDREA bar's) 2) Dissection: A. The RV rises above level of posterior sternal plate inferiorly (start high) B. The innominate vein is unusually low (can feel for the wires) C. Will likely need to incorporate proximal CABG anastomosis in side-biter 
                 for outflow graft D. LIMA to LAD graft is fairly lateral and should be out of the way E. Will need to get aorta and LV apex out as usual 
            F. Place drive-line (somewhat thin adipose tissue) 3) Cannulation (give heparin): A. Left groin 25 Fr venous (will save the right in case we need RVAD;  
     can place triple lumen early) B. Build 10 mm graft side-arm off Impella graft (glue; will need to extend  
                 axiallary graft back); have 3/8 connector already attached 4) Implant: A. Remove Impella, de-clot graft (#4 Shakila), back-flush, hook up to bypass  
                circuit, go on bypass (place extra sucker in axillary wound Kam Askew, find apex, and core (remove trabeculations; suture in CO2 and  
                 pump sucker lines) C. Place (4) 2-0 Ethibond sutures, bring them through inflow cannula sewing  
                 ring, and tie down D. Place (2) 4-0 SH running sutures, bioglue E. Attach pump, remove packs, and lower into well F. Clamp outflow graft, turn on drips, and turn on pump at 3000 
            G. Measure outflow graft (add 1 cm), side-biter, aortotomy, and outflow graft  
                 anastomosis H. Place de-airing stitch and remove clamps  
            I. Come off bypass and up on LVAD as usual 
            J. Staple off axillary graft  
  
Chest / Abdominal CT scan:  
  
Sternum is  although it looks like he still has sternal bone; the right half is higher than the left half and will need to some down; should be able to close with 12 standard wires although the sternum is thin; back-up plan would be a weave  
  
RV is coming above the sternal edge somewhat at the inferior margin so need to be careful here 
  
Proximal comes off anterior portion of the aorta; may need to place side-biter such that it incorporates the proximal; needs a LHC to figure out which way the graft is going; no significant calcium in the ascending aorta; Impella in place  
  
LIMA to LAD graft is fairly lateral so should be out of the way for dissection  
  
Right atrium looks a little big 
  
Innominate vein is a little low so need to be carefull with dissection (should be able to feel the pace leads)  
  Bilateral common iliac arteries are severely calcified and narrowed down into the 4 mm range; will be somewhat hazardous trying to place femoral arterial cannula; will be best to extend out impella graft, build 10 mm side-arm, and go on through the axillary artery; will need to flush out the graft after we pull the Impella and before going on bypass (timing will be important here); will need to place the femoral venous line first 
  
Abdominal wall adipose tissue is thin so need to be careful with drive-line  
  
LHC - pending 
  
TTE - severely dilated LV cavity (7.36 cm) with reduced EF (10%); mild MR, large left atrium; previous TTE's did not reveal any AI; has moderate RV dysfunction (RVIDD 5.4, RV/LV ratio 0.73, TAPSE 1.4, mild TR; good free wall motion); little worried that sternal closure with compress his RV so will be prepare for temp RVAD support  
  
Carotids - normal 
  
ABIs - normal  
  
PFT's - FEV-1 2.25 (59% predicted)  
  
Epistaxis - resolved  
  
Hematuria - resolved  
  
Hgb 8.2, platelets 98, INR 1.1 
  
BUN 15, creatinine 1.13 
  
Bilirubin 1.3, ALT 28, AST 35, alk phos 130 
  
LDH - 438, lactic acid 0.9 
  
NT pro-BNP - 7103  
  
CEA - 6.6 (mildy elevated)  
  
CXR - mild pulmonary edema 
  
Impella - flow 3 L/min @ P-6 Getting ready for colonoscopy Hgb looks good Platelets a little low Creatinien normal 
 
Bilirubin and other LFTs look good LDH and lactic acid reasonable Pro-calcitonin normal NT pro-BNP about the same Went over issues with family Discussed with HF team 
 
May need to get 2 PA's for early part of case CXR - mild pulmonary edema Intake/Output Summary (Last 24 hours) at 11/10/2019 1427 Last data filed at 11/10/2019 1200 Gross per 24 hour Intake 1553.5 ml Output 3200 ml Net -1646.5 ml Visit Vitals BP 99/84 Pulse 71 Temp 97.8 °F (36.6 °C) Resp 23 Ht 6' 2\" (1.88 m) Wt 194 lb 14.2 oz (88.4 kg) SpO2 (!) 80% BMI 25.02 kg/m² Risk of morbidity and mortality - high Medical decision making - high complexity Total critical care time - 105 minutes (CPT 36143, 99292 x 2)

## 2019-11-10 NOTE — PROGRESS NOTES
Problem: Falls - Risk of 
Goal: *Absence of Falls Description Document Sherri Escobedo Fall Risk and appropriate interventions in the flowsheet. Outcome: Progressing Towards Goal 
Note:  
Fall Risk Interventions: 
Mobility Interventions: Communicate number of staff needed for ambulation/transfer, Patient to call before getting OOB Mentation Interventions: Adequate sleep, hydration, pain control, Door open when patient unattended, Evaluate medications/consider consulting pharmacy, Room close to nurse's station, Toileting rounds Medication Interventions: Assess postural VS orthostatic hypotension, Evaluate medications/consider consulting pharmacy, Patient to call before getting OOB, Teach patient to arise slowly Elimination Interventions: Call light in reach, Patient to call for help with toileting needs, Toileting schedule/hourly rounds History of Falls Interventions: Consult care management for discharge planning, Door open when patient unattended, Evaluate medications/consider consulting pharmacy, Room close to nurse's station, Investigate reason for fall Problem: Patient Education: Go to Patient Education Activity Goal: Patient/Family Education Outcome: Progressing Towards Goal 
  
Problem: Pain Goal: *Control of Pain Outcome: Progressing Towards Goal 
Goal: *PALLIATIVE CARE:  Alleviation of Pain Outcome: Progressing Towards Goal 
  
Problem: Patient Education: Go to Patient Education Activity Goal: Patient/Family Education Outcome: Progressing Towards Goal 
  
Problem: Pressure Injury - Risk of 
Goal: *Prevention of pressure injury Description Document Mich Scale and appropriate interventions in the flowsheet.  
Outcome: Progressing Towards Goal 
Note:  
Pressure Injury Interventions: 
Sensory Interventions: Keep linens dry and wrinkle-free, Maintain/enhance activity level, Minimize linen layers, Monitor skin under medical devices, Pressure redistribution bed/mattress (bed type), Turn and reposition approx. every two hours (pillows and wedges if needed) Moisture Interventions: Absorbent underpads, Internal/External urinary devices, Maintain skin hydration (lotion/cream), Minimize layers, Moisture barrier Activity Interventions: Increase time out of bed, Pressure redistribution bed/mattress(bed type) Mobility Interventions: HOB 30 degrees or less, Pressure redistribution bed/mattress (bed type), Turn and reposition approx. every two hours(pillow and wedges) Nutrition Interventions: Document food/fluid/supplement intake, Discuss nutritional consult with provider Friction and Shear Interventions: Apply protective barrier, creams and emollients, HOB 30 degrees or less, Lift sheet, Minimize layers, Transferring/repositioning devices Problem: Patient Education: Go to Patient Education Activity Goal: Patient/Family Education Outcome: Progressing Towards Goal 
  
Problem: Infection - Risk of, Multi-drug Resistant Organism Colonization (MDRO) Goal: *Absence of MDRO colonization Outcome: Progressing Towards Goal 
Goal: *Absence of infection signs and symptoms Outcome: Progressing Towards Goal 
  
Problem: Patient Education: Go to Patient Education Activity Goal: Patient/Family Education Outcome: Progressing Towards Goal 
  
Problem: Patient Education: Go to Patient Education Activity Goal: Patient/Family Education Outcome: Progressing Towards Goal 
  
Problem: Heart Failure: Day 5 Goal: Off Pathway (Use only if patient is Off Pathway) Outcome: Progressing Towards Goal 
Goal: Activity/Safety Outcome: Progressing Towards Goal 
Goal: Diagnostic Test/Procedures Outcome: Progressing Towards Goal 
Goal: Nutrition/Diet Outcome: Progressing Towards Goal 
Goal: Discharge Planning Outcome: Progressing Towards Goal 
Goal: Medications Outcome: Progressing Towards Goal 
Goal: Respiratory Outcome: Progressing Towards Goal 
Goal: Treatments/Interventions/Procedures Outcome: Progressing Towards Goal 
Goal: Psychosocial 
Outcome: Progressing Towards Goal 
  
Problem: Heart Failure: Discharge Outcomes Goal: *Demonstrates ability to perform prescribed activity without shortness of breath or discomfort Outcome: Progressing Towards Goal 
Goal: *Left ventricular function assessment completed prior to or during stay, or planned for post-discharge Outcome: Progressing Towards Goal 
Goal: *ACEI prescribed if LVEF less than 40% and no contraindications or ARB prescribed Outcome: Progressing Towards Goal 
Goal: *Verbalizes understanding and describes prescribed diet Outcome: Progressing Towards Goal 
Goal: *Verbalizes understanding/describes prescribed medications Outcome: Progressing Towards Goal 
Goal: *Describes available resources and support systems Description 
(eg: Home Health, Palliative Care, Advanced Medical Directive) Outcome: Progressing Towards Goal 
Goal: *Describes smoking cessation resources Outcome: Progressing Towards Goal 
Goal: *Understands and describes signs and symptoms to report to providers(Stroke Metric) Outcome: Progressing Towards Goal 
Goal: *Describes/verbalizes understanding of follow-up/return appt Description 
(eg: to physicians, diabetes treatment coordinator, and other resources Outcome: Progressing Towards Goal 
Goal: *Describes importance of continuing daily weights and changes to report to physician Outcome: Progressing Towards Goal 
  
Problem: Diabetes Self-Management Goal: *Disease process and treatment process Description Define diabetes and identify own type of diabetes; list 3 options for treating diabetes. Outcome: Progressing Towards Goal 
Goal: *Incorporating nutritional management into lifestyle Description Describe effect of type, amount and timing of food on blood glucose; list 3 methods for planning meals.  
Outcome: Progressing Towards Goal 
 Goal: *Incorporating physical activity into lifestyle Description State effect of exercise on blood glucose levels. Outcome: Progressing Towards Goal 
Goal: *Developing strategies to promote health/change behavior Description Define the ABC's of diabetes; identify appropriate screenings, schedule and personal plan for screenings. Outcome: Progressing Towards Goal 
Goal: *Using medications safely Description State effect of diabetes medications on diabetes; name diabetes medication taking, action and side effects. Outcome: Progressing Towards Goal 
Goal: *Monitoring blood glucose, interpreting and using results Description Identify recommended blood glucose targets  and personal targets. Outcome: Progressing Towards Goal 
Goal: *Prevention, detection, treatment of acute complications Description List symptoms of hyper- and hypoglycemia; describe how to treat low blood sugar and actions for lowering  high blood glucose level. Outcome: Progressing Towards Goal 
Goal: *Prevention, detection and treatment of chronic complications Description Define the natural course of diabetes and describe the relationship of blood glucose levels to long term complications of diabetes. Outcome: Progressing Towards Goal 
Goal: *Developing strategies to address psychosocial issues Description Describe feelings about living with diabetes; identify support needed and support network Outcome: Progressing Towards Goal 
Goal: *Insulin pump training Outcome: Progressing Towards Goal 
Goal: *Sick day guidelines Outcome: Progressing Towards Goal 
Goal: *Patient Specific Goal (EDIT GOAL, INSERT TEXT) Outcome: Progressing Towards Goal 
  
Problem: Patient Education: Go to Patient Education Activity Goal: Patient/Family Education Outcome: Progressing Towards Goal 
  
Problem: Patient Education: Go to Patient Education Activity Goal: Patient/Family Education Outcome: Progressing Towards Goal 
  
 Problem: Discharge Planning Goal: *Discharge to safe environment Outcome: Progressing Towards Goal 
  
Problem: Patient Education: Go to Patient Education Activity Goal: Patient/Family Education Outcome: Progressing Towards Goal 
  
Problem: Infection - Risk of, Surgical Site Infection Goal: *Absence of surgical site infection signs and symptoms Outcome: Progressing Towards Goal 
  
Problem: Patient Education: Go to Patient Education Activity Goal: Patient/Family Education Outcome: Progressing Towards Goal 
  
Problem: Infection - Risk of, Surgical Site Infection Goal: *Absence of surgical site infection signs and symptoms Outcome: Progressing Towards Goal 
  
Problem: Patient Education: Go to Patient Education Activity Goal: Patient/Family Education Outcome: Progressing Towards Goal 
  
Problem: Patient Education: Go to Patient Education Activity Goal: Patient/Family Education Outcome: Progressing Towards Goal 
  
Problem: Nutrition Deficit Goal: *Optimize nutritional status Outcome: Progressing Towards Goal 
  
Problem: Nutrition Deficit Goal: *Optimize nutritional status Outcome: Progressing Towards Goal 
  
Problem: Nutrition Deficit Goal: *Optimize nutritional status Outcome: Progressing Towards Goal

## 2019-11-10 NOTE — PROGRESS NOTES
Bedside and Verbal shift change report given to Kiah (oncoming nurse) by Rashad Reed (offgoing nurse). Report included the following information SBAR, Kardex, Intake/Output, MAR, Accordion, Recent Results, Cardiac Rhythm -Paced and Alarm Parameters . 2000 - Assumed care. 2200 - Impella purge solution down to 3, TPA given per protocol. 0300 - AM labs drawn. 0400 - AM CXR obtained. Bedside and Verbal shift change report given to Rashad Reed (oncoming nurse) by Aimee Graham (offgoing nurse). Report included the following information SBAR, Kardex, Intake/Output, MAR, Accordion, Recent Results, Cardiac Rhythm -Paced and Alarm Parameters .

## 2019-11-10 NOTE — PROGRESS NOTES
0730 Bedside shift change report given to Jasbir (oncoming nurse) by Beulah Caba (offgoing nurse). Report included the following information SBAR, Kardex, Procedure Summary, Intake/Output, MAR, Recent Results and Cardiac Rhythm Paced. 1100 Pt able to ambulate length of ccu 3x 
 
1930 Bedside shift change report given to Beulah Caba (oncoming nurse) by Jasbir (offgoing nurse). Report included the following information SBAR, Kardex, Procedure Summary, Intake/Output, MAR, Recent Results and Cardiac Rhythm Paced.

## 2019-11-10 NOTE — PROGRESS NOTES
GI PROGRESS NOTE 
 
 
NAME: Princess Kim :  1950 MRN:  [id number] Subjective:  
Patient is sitting in his bed. Pleasant. No nausea, vomiting, or abdominal pain. No melena or hematochezia. Objective: VITALS:  
Last 24hrs VS reviewed since prior progress note. Most recent are: 
Visit Vitals BP (!) 123/98 Pulse 79 Temp 97.8 °F (36.6 °C) Resp 24 Ht 6' 2\" (1.88 m) Wt 88.4 kg (194 lb 14.2 oz) SpO2 95% BMI 25.02 kg/m² PHYSICAL EXAM: 
General: Cooperative, no acute distress   
Neurologic:  Alert and oriented HEENT: EOMI, no scleral icterus Lungs:  Diminished bilaterally anteriorly Heart:  S1 S2 Abdomen: Soft, non-distended, no tenderness, no guarding, no rebound. +Bowel sounds. Extremities: Warm Psych:   Not anxious or agitated Lab Data Reviewed:  
 
Recent Results (from the past 24 hour(s)) GLUCOSE, POC Collection Time: 19 11:34 AM  
Result Value Ref Range Glucose (POC) 106 (H) 65 - 100 mg/dL Performed by Juan C Leon GLUCOSE, POC Collection Time: 19  5:22 PM  
Result Value Ref Range Glucose (POC) 101 (H) 65 - 100 mg/dL Performed by Juan C Leon GLUCOSE, POC Collection Time: 19  9:14 PM  
Result Value Ref Range Glucose (POC) 105 (H) 65 - 100 mg/dL Performed by Deo Bacon LD Collection Time: 11/10/19  4:23 AM  
Result Value Ref Range  (H) 85 - 241 U/L  
NT-PRO BNP Collection Time: 11/10/19  4:23 AM  
Result Value Ref Range NT pro-BNP 6,598 (H) <125 PG/ML  
LACTIC ACID Collection Time: 11/10/19  4:23 AM  
Result Value Ref Range Lactic acid 0.9 0.4 - 2.0 MMOL/L  
PROTHROMBIN TIME + INR Collection Time: 11/10/19  4:23 AM  
Result Value Ref Range INR 1.1 0.9 - 1.1 Prothrombin time 11.4 (H) 9.0 - 11.1 sec PTT Collection Time: 11/10/19  4:23 AM  
Result Value Ref Range  aPTT 27.7 22.1 - 32.0 sec  
 aPTT, therapeutic range     58.0 - 77.0 SECS  
 MAGNESIUM Collection Time: 11/10/19  4:23 AM  
Result Value Ref Range Magnesium 2.1 1.6 - 2.4 mg/dL PROCALCITONIN Collection Time: 11/10/19  4:23 AM  
Result Value Ref Range Procalcitonin 0.1 ng/mL CBC W/O DIFF Collection Time: 11/10/19  4:23 AM  
Result Value Ref Range WBC 4.8 4.1 - 11.1 K/uL  
 RBC 2.89 (L) 4.10 - 5.70 M/uL HGB 8.6 (L) 12.1 - 17.0 g/dL HCT 28.0 (L) 36.6 - 50.3 % MCV 96.9 80.0 - 99.0 FL  
 MCH 29.8 26.0 - 34.0 PG  
 MCHC 30.7 30.0 - 36.5 g/dL RDW 20.8 (H) 11.5 - 14.5 % PLATELET 603 (L) 842 - 400 K/uL MPV 10.0 8.9 - 12.9 FL  
 NRBC 0.0 0  WBC ABSOLUTE NRBC 0.00 0.00 - 0.01 K/uL METABOLIC PANEL, COMPREHENSIVE Collection Time: 11/10/19  4:23 AM  
Result Value Ref Range Sodium 134 (L) 136 - 145 mmol/L Potassium 4.0 3.5 - 5.1 mmol/L Chloride 101 97 - 108 mmol/L  
 CO2 29 21 - 32 mmol/L Anion gap 4 (L) 5 - 15 mmol/L Glucose 89 65 - 100 mg/dL BUN 16 6 - 20 MG/DL Creatinine 1.25 0.70 - 1.30 MG/DL  
 BUN/Creatinine ratio 13 12 - 20 GFR est AA >60 >60 ml/min/1.73m2 GFR est non-AA 57 (L) >60 ml/min/1.73m2 Calcium 8.5 8.5 - 10.1 MG/DL Bilirubin, total 1.2 (H) 0.2 - 1.0 MG/DL  
 ALT (SGPT) 30 12 - 78 U/L  
 AST (SGOT) 29 15 - 37 U/L Alk. phosphatase 133 (H) 45 - 117 U/L Protein, total 5.7 (L) 6.4 - 8.2 g/dL Albumin 2.3 (L) 3.5 - 5.0 g/dL Globulin 3.4 2.0 - 4.0 g/dL A-G Ratio 0.7 (L) 1.1 - 2.2 GLUCOSE, POC Collection Time: 11/10/19  6:46 AM  
Result Value Ref Range Glucose (POC) 100 65 - 100 mg/dL Performed by Gabriel Jones Assessment:  
· LVAD evaluation: Impella in place. · Iron deficiency anemia: Hgb 8.5, platelets 97. Bivalirudin on hold. No signs of active GI bleeding.  · Thrombocytopenia: status post 1 unit platelets 94/81/65. · History of colon polyps: Colonoscopy in 2009 with reported tubular adenomas. · Acute on chronic systolic heart failure · History of VF arrest status post AICD · Coronary artery disease status post CABG in 2010 Patient Active Problem List  
Diagnosis Code  Paroxysmal atrial fibrillation (HCC) I48.0  Acute on chronic systolic CHF (congestive heart failure) (HCC) I50.23  Systolic CHF, chronic (HCC) I50.22  
 Peripheral vascular disease (HCC) I73.9  Acute decompensated heart failure (HCC) I50.9 Plan:  
 
· Plans are  for EGD and colonoscopy on Monday with Dr. Florencia Moncada. · Clear liquids today with bowel prep today. NPO after midnight · Plan d/w pt and his ICU RN. Signed By: Chandni Quezada MD   
 11/10/2019

## 2019-11-10 NOTE — PROGRESS NOTES
\[facility]\"" ICU Progress Note Admit Date: 10/25/2019 POD:  10 Day Post-Op Procedure:  Procedure(s): RIGHT AXILLARY IMPELLA INSERTION Subjective:  
Pt seen with Dr. Antonio Evans. On room air, sitting up in bed. Afebrile. Impella P8, D5 purge. Alda in [address] [place] [address] per Urology. Objective:  
Vitals: 
Blood pressure (!) 123/98, pulse 79, temperature 97.8 °F (36.6 °C), resp. rate 24, height 6' 2\" (1.88 m), weight 194 lb 14.2 oz (88.4 kg), SpO2 95 %. Temp (24hrs), Av °F (36.7 °C), Min:97.8 °F (36.6 °C), Max:98.2 °F (36.8 °C) Hemodynamics: 
 CO: CO (l/min): 8.3 l/min CI: CI (l/min/m2): 4 l/min/m2 CVP: CVP (mmHg): 10 mmHg (11/10/19 0700) SVR: SVR (dyne*sec)/cm5: 781 (dyne*sec)/cm5 (19 1500) PAP Systolic: PAP Systolic: 45 (/60 6857) PAP Diastolic: PAP Diastolic: 19 ( 2255) PVR:   
 SV02: SVO2 (%): 51 % (19 1500) SCV02: SCVO2 (%): 75 % (10/29/19 1900) EKG/Rhythm:   
Paced in the 90s Oxygen Therapy: 
Oxygen Therapy O2 Sat (%): 95 % (11/10/19 07) Pulse via Oximetry: 83 beats per minute (19 1500) O2 Device: Room air (19) O2 Flow Rate (L/min): 4 l/min (19 0400) FIO2 (%): 40 % (10/30/19 0846) CXR:  
CXR Results  (Last 48 hours)  
          
 11/10/19 0457  XR CHEST PORT Final result Impression:  IMPRESSION:  
No significant change in mild pulmonary edema. Narrative:  INDICATION: postop heart EXAMINATION:  AP CHEST, PORTABLE  
   
COMPARISON: 2019 FINDINGS: Single AP portable view of the chest at 0407 hours demonstrates no  
change in position of the lines and tubes. The cardiomediastinal silhouette is  
unchanged. No significant change in mild pulmonary edema. No pneumothorax. 19 0447  XR CHEST PORT Final result Impression:  IMPRESSION: No significant change. Narrative:  EXAM:  XR CHEST PORT. INDICATION: Postop heart. COMPARISON: 2019.   
   
FINDINGS:   
 A portable AP radiograph of the chest was obtained at 0359 hours. The  
positioning is lordotic There is a pacemaker in the left chest, unchanged in  
position. The right subclavian Impella device is also unchanged. Lines and tubes: The patient is on a cardiac monitor. There is a right arm PICC  
line which is unchanged in position. Lungs: There is mild interstitial edema throughout the lungs, unchanged. Pleura: There is no pneumothorax or pleural effusion. Mediastinum: The cardiac silhouette is borderline enlarged. There are  
mediastinal clips. Bones and soft tissues: There are surgical clips and skin staples in the right  
infraclavicular region. Admission Weight: Last Weight Weight: 192 lb 10.9 oz (87.4 kg) Weight: 194 lb 14.2 oz (88.4 kg) Intake / Output / Drain: 
Current Shift: No intake/output data recorded. Last 24 hrs.:  
 
Intake/Output Summary (Last 24 hours) at 11/10/2019 1028 Last data filed at 11/10/2019 0700 Gross per 24 hour Intake 3848.1 ml Output 4050 ml Net -201.9 ml EXAM: 
General:   NAD. Up in the chair Lungs:   Diminished in the bases. Incision:  Impella dressing C/D/I Heart:  Regular rate and rhythm, S1, S2 normal, no murmur, click, rub or gallop. Abdomen:   Soft, non-tender. Bowel sounds hypoactive. No masses,  No organomegaly. Lizama with gross hematuria Extremities:  +1 bilateral lower extremity pitting edema. PPP. Neurologic:  Gross motor and sensory apparatus intact. Labs:  
Recent Labs 11/10/19 
9826 11/10/19 
0423 WBC  --  4.8 HGB  --  8.6* HCT  --  28.0*  
PLT  --  105* NA  --  134* K  --  4.0  
BUN  --  16  
CREA  --  1.25  
GLU  --  89  
GLUCPOC 100  --   
INR  --  1.1 Assessment:  
 
Active Problems: 
  Acute decompensated heart failure ([facility] [address] (10/25/2019) Plan/Recommendations/Medical Decision Making: 1. Acute on chronic systolic (CHF Class IV) S/P Impella: Has ICD. LV EF 16-20%. No BB/ACE/ARB/AA until appropriate. Bumex 1 mg IV QID-PRN. Trend proBNP, lactate, LDH. Strict I/Os. Daily weights. LVAD w/u in process. Cefepime changed to zosyn per ID for prophylaxis. Continue D5 purge due to hematuria/epistaxis 2. Thrombocytopenia: Platelets improving at 98K. No asa. HIT negative, LOAN negative. On protonix. Heme following. 3. CAD S/p CABG 2010: Needs LHC, timing TBD, once UTI has cleared. Stop asa d/t hematuria/thrombocytopenia, not on statin historically. No BB D/t pressors/intropes. 4. PAF s/p DCCV 6/2019: Holding eliquis due to hematuria/epistaxis. On PO amio. 5. JUAN J on CKD stage IV: Monitor. Renal following. Diuretics PRN CVP >10. Keep nael. 6. Hx of urinary retention/BPH, hematuria:  On flomax. Neal with CBI per Urology but will likely wean off. Urology following. 7. JOSE: qhs CPAP. 8. Hx of sternal wound infection requring sternectomy: Not a contraindication for future LVAD. Tagged WBC -- showed no abnormal tracer uptake. 9. Iron def anemia: s/p venofer. H&H stable today. On Epogen. Cannot use doxy/octreotide d/t prolonged QTC. Occult blood in stool positive per POC testing. Gastroenterology following for LVAD work up. 10. Vocal cord paralysis: Voice improving. Speech eval PRN. Advance diet as tolerated. 11. Constipation: Resolved last BM 11/6. Continue pericolace, miralax(pt keeps refusing). Continue daily suppository PRN. 12. Serratia UTI/hematuria: Urology following planning to transition off CBI. On Zosyn-ID following. 13. Mediastinal lymphadenopathy:  Per AHF, low threshold for Carcinoma per CT scans. Eventually needs PET scan. 14. Hyponatremia: Resolved-monitor 15. Acute Moderate protein-calorie malnutrition:  Pre-albumin 10/25 was 13.9. Recheck 11/4 was 16.2. Pt eating cardiac diet well. Continue marinol Dispo: Care and orders per AHFS. Remain in CCU.  
 
Signed By: Kareen Flower NP

## 2019-11-10 NOTE — PROGRESS NOTES
[address] [facility] in [place], [place] Inpatient Progress Note Patient name: Nickie Reddy Patient : 1950 Patient MRN: [id number] Attending MD: Vika Ibarra MD 
Date of service: 11/10/19 CHIEF COMPLAINT: 
Cardiogenic shock 
  
PLAN: 
Continue current impella speed at P8; cannot tolerate lower speeds Continue bumex 1mg IV every 6 hours PRN for CVP > 10; renal consult appreciated Continue CAROL hose to mobilize LE fluid Transduce CVP off PICC No ACE/ARB/ARNi in anticipation of surgery No AA due to hyponatremia No tolvaptan due to hepatic dysfuction Start low dose hydralazine 10mg TID- keep SBP < 120, MAP < 90 Continue small dose amiodarone for PAF Anemia and thrombocytopenia likely due to hematuria and hemolysis Urinary retention and Serratia UTI with hematuria, on antibiotics by ID - ?length of therapy - need to determine timing of LVAD implant Urology consult appreciated, continue bladder irrigation Request cystoscopy to evaluate for bladder carcinoma prior to LVAD implant Anticoagulation and ASA on hold due to hematuria and epistaxis Cannot use octeotride or doxycycline for epistaxis/AVM prophylaxis due to QTc 502 ms Nutritionist consult, prealbumins weekly Continue Marinol and 6 small meals daily- diet adjusted Pulmonary consult appreciated - repeat chest CT showing again mild lymphadenopathy, no mass/nodules Unable to perform DLCO while on impella support PFTs unchanged post bronchodilator therapy, but adequate for LVAD consideration Colonoscopy, EGD scheduled for Monday at 12:30 at the bedside Delay [facility] until UTI clears and GI evaluation completed to r/o CA - tentatively requested for  Unble to have PET scan due to dextrose required for Impella infusion Ongoing PT/OT/VAD education Tentatively planned for LVAD implant  - DT letter requested from [facility] IMPRESSION: 
Cardiogenic shock S/p Impella 5.0 implant 10/29/19 by Dr. Maria Del Rosario Magana Acute on chronic systolic heart failure Stage D, NYHA class IV symptoms Non-ischemic cardiomyopathy, LVEF 15%, LVEDD 5.7cm Hyponatremia Liver dysfunction Thrombocytopenia H/o VF arrest s/p ICD 
CAD s/p CABG 2010 C/b sternal wound infection requiring sternectomy CKD PAF s/p DCCV 61/8/19 UTI with GNR Anemia, iron deficiency Cardiac cachexia Vocal cord paralysis Epistaxis Hematuria 
  
CARDIAC IMAGING: 
Tagged WBC negative 
  INTERVAL HISTORY: 
Feels well today Hgb stable CBI continues, hematuria improving BP trending up CVP around 7-10 Eating well PHYSICAL EXAM: 
Visit Vitals BP (!) 123/98 Pulse 79 Temp 97.8 °F (36.6 °C) Resp 24 Ht 6' 2\" (1.88 m) Wt 194 lb 14.2 oz (88.4 kg) SpO2 95% BMI 25.02 kg/m² Impella (5.0) Performance Level (P Level): 8 Flow Rate L/min (0-2.5): 4.4 L/min Placement Signal (mmHg): Differential 5.0 (s/d 30-60/0 ex) Placement Signal (Systolic/Diastolic): (67/-1) Motor Current (amp): (normal) Motor Current (Systolic/Diastolic): 271/153 Placement Monitoring: OK Purge Pressure (300-1100 mm Hg): 467 Purge Flow (ml/hr): 8.5 Sidearm Pressure Bag @ 300 mmHg/ flushed (Na with 5.0 Impella): No 
Power (AC/Battery): Yes Impella Pump Serial Number: [id number] Hemodynamics: 
 CVP: CVP (mmHg): 10 mmHg (11/10/19 0700) Review of Systems Constitutional: Negative for chills, fever and malaise/fatigue. HENT: Positive for hearing loss. Respiratory: Negative for cough and shortness of breath. Cardiovascular: Negative for chest pain, palpitations, leg swelling and PND. Gastrointestinal: Negative for heartburn, nausea and vomiting. Appetite greatly improved Musculoskeletal: Negative. Neurological: Negative for dizziness, focal weakness and headaches. Psychiatric/Behavioral: Negative.    
 
 
Physical Exam  
Constitutional: He is oriented to person, place, and time and well-developed, well-nourished, and in no distress. No distress. HENT:  
Head: Normocephalic. Neck: Normal range of motion. Neck supple. No JVD present. Cardiovascular: Normal rate, regular rhythm, normal heart sounds and intact distal pulses. Pulmonary/Chest: Effort normal and breath sounds normal. No respiratory distress. Abdominal: Soft. Bowel sounds are normal. He exhibits no distension. Musculoskeletal: Normal range of motion. He exhibits no edema. Neurological: He is alert and oriented to person, place, and time. Skin: Skin is warm and dry. No erythema. Psychiatric: Affect and judgment normal.  
Nursing note and vitals reviewed. PAST MEDICAL HISTORY: 
Past Medical History:  
Diagnosis Date  Degenerative disc disease, lumbar  Heart failure ([facility] [address] [facility]  High cholesterol  Hypertension  Paroxysmal atrial fibrillation ([facility] [address] [facility] 4/2/2019  Spinal stenosis PAST SURGICAL HISTORY: 
Past Surgical History:  
Procedure Laterality Date  HX APPENDECTOMY  HX CORONARY ARTERY BYPASS GRAFT    
 triple  HX HERNIA REPAIR    
 HX IMPLANTABLE CARDIOVERTER DEFIBRILLATOR  MO CARDIOVERSION ELECTIVE ARRHYTHMIA EXTERNAL N/A 6/10/2019 EP CARDIOVERSION [place] [address], [facility] CATH LAB  MO CARDIOVERSION ELECTIVE ARRHYTHMIA EXTERNAL N/A 6/18/2019 EP CARDIOVERSION performed by Dipesh Villalba [place] [address], [facility] CATH LAB  MO INSJ/RPLCMT PERM DFB W/TRNSVNS LDS 1/DUAL CHMBR N/A 6/21/2019 INSERT ICD BIV MULTI [facility] CATH LAB  MO TCAT IMPL WRLS P-ART PRS SNR L-T HEMODYN MNTR N/A 9/18/2019 IMPLANT HEART FAILURE MONITORING DEVICE performed by Eder Murphy [place] [address], [facility] CATH LAB FAMILY HISTORY: 
Family History Problem Relation Age of Onset  Lung Disease Mother  Hypertension Mother Aelius.Kady Arthritis-osteo Mother  Heart Disease Mother  Heart Disease Father  Diabetes Father SOCIAL HISTORY: 
Social History Socioeconomic History  Marital status:  Spouse name: Not on file  Number of children: Not on file  Years of education: Not on file  Highest education level: Not on file Tobacco Use  Smoking status: Former Smoker Last attempt to quit: 2010 Years since quittin.9  Smokeless tobacco: Never Used Substance and Sexual Activity  Alcohol use: Not Currently Comment: rarely  Drug use: Never Other Topics Concern LABORATORY RESULTS: 
  
Labs Latest Ref Rng & Units 11/10/2019 2019 2019 2019 2019 2019 2019 WBC 4.1 - 11.1 K/uL 4.8 5.0 4.9 - 4.6 4.9 5.3  
RBC 4.10 - 5.70 M/uL 2.89(L) 2.82(L) 2.89(L) - 2.69(L) 2.76(L) 2.83(L) Hemoglobin 12.1 - 17.0 g/dL 8.6(L) 8.2(L) 8.5(L) 8.5(L) 7. 8(L) 8.0(L) 8. 3(L) Hematocrit 36.6 - 50.3 % 28. 0(L) 27. 0(L) 27. 7(L) 27. 9(L) 25. 4(L) 26. 0(L) 26. 4(L) MCV 80.0 - 99.0 FL 96.9 95.7 95.8 - 94.4 94.2 93.3 Platelets 832 - 926 K/uL 105(L) 98(L) 97(L) - 90(L) 84(L) 80(L) Lymphocytes 12 - 49 % - - - - - - - Monocytes 5 - 13 % - - - - - - - Eosinophils 0 - 7 % - - - - - - - Basophils 0 - 1 % - - - - - - - Albumin 3.5 - 5.0 g/dL 2. 3(L) 2. 3(L) 2. 4(L) - 2. 4(L) 2. 3(L) 2. 4(L) Calcium 8.5 - 10.1 MG/DL 8.5 8.6 8.7 - 8.7 8.5 8.5 SGOT 15 - 37 U/L 29 35 36 - 40(H) 42(H) 50(H) Glucose 65 - 100 mg/dL 89 79 85 - 90 83 90 BUN 6 - 20 MG/DL 16 15 17 - 18 18 25(H) Creatinine 0.70 - 1.30 MG/DL 1.25 1.13 1.25 - 1.19 1.03 1.07 Sodium 136 - 145 mmol/L 134(L) 135(L) 136 - 135(L) 132(L) 128(L) Potassium 3.5 - 5.1 mmol/L 4.0 4.0 4.1 - 3.6 3.9 4.3 TSH 0.36 - 3.74 uIU/mL - - - - - - -  
PSA 0.01 - 4.0 ng/mL - - - - - - -  
LDH 85 - 241 U/L 435(H) 438(H) 439(H) - 458(H) 436(H) 482(H) CEA ng/mL - - 6.6 - - - - Some recent data might be hidden Lab Results Component Value Date/Time  TSH 2.40 10/25/2019 07:39 PM  
 TSH 2.45 06/01/2019 04:16 AM  
 
 
ALLERGY: 
No Known Allergies CURRENT MEDICATIONS: 
Current Facility-Administered Medications Medication Dose Route Frequency  peg 3350-electrolytes (COLYTE) 4000 mL  4,000 mL Oral ONCE  
 0.9% sodium chloride infusion 250 mL  250 mL IntraVENous PRN  
 amiodarone (CORDARONE) tablet 100 mg  100 mg Oral DAILY  dronabinol (MARINOL) capsule 2.5 mg  2.5 mg Oral ACB&D  piperacillin-tazobactam (ZOSYN) 3.375 g in 0.9% sodium chloride (MBP/ADV) 100 mL  3.375 g IntraVENous Q8H  
 insulin lispro (HUMALOG) injection   SubCUTAneous AC&HS  bumetanide (BUMEX) injection 1 mg  1 mg IntraVENous Q6H PRN  
 fluticasone propionate (FLONASE) 50 mcg/actuation nasal spray 2 Spray  2 Spray Both Nostrils DAILY  sodium chloride (OCEAN) 0.65 % nasal squeeze bottle 2 Spray  2 Spray Both Nostrils QID  alteplase (CATHFLO) 1 mg in dextrose 5% 50 mL impella purge solution  1 mg Other TITRATE  epoetin yvonne-epbx (RETACRIT) injection 10,000 Units  10,000 Units SubCUTAneous Q TUE, THU & SAT  pantoprazole (PROTONIX) tablet 40 mg  40 mg Oral ACB  dextrose 5% infusion  4-20 mL/hr IntraVENous CONTINUOUS  
 [Held by provider] bivalirudin (ANGIOMAX) 250 mg in dextrose 5% 250 mL infusion  4-20 mL/hr Other TITRATE  alteplase (CATHFLO) 1 mg in sterile water (preservative free) 1 mL injection  1 mg InterCATHeter PRN  
 bacitracin 500 unit/gram packet 1 Packet  1 Packet Topical PRN  
 sodium chloride (NS) flush 5-40 mL  5-40 mL IntraVENous Q8H  
 sodium chloride (NS) flush 5-40 mL  5-40 mL IntraVENous PRN  
 0.45% sodium chloride infusion  10 mL/hr IntraVENous CONTINUOUS  
 0.9% sodium chloride infusion  10 mL/hr IntraVENous CONTINUOUS  
 oxyCODONE IR (ROXICODONE) tablet 5 mg  5 mg Oral Q4H PRN  
 oxyCODONE IR (ROXICODONE) tablet 10 mg  10 mg Oral Q4H PRN  
 morphine injection 4 mg  4 mg IntraVENous Q2H PRN  
 naloxone (NARCAN) injection 0.4 mg  0.4 mg IntraVENous PRN  
  ondansetron (ZOFRAN) injection 4 mg  4 mg IntraVENous Q4H PRN  
 albuterol (PROVENTIL VENTOLIN) nebulizer solution 2.5 mg  2.5 mg Nebulization Q4H PRN  chlorhexidine (PERIDEX) 0.12 % mouthwash 10 mL  10 mL Oral Q12H  
 magnesium oxide (MAG-OX) tablet 400 mg  400 mg Oral BID  calcium chloride 1 g in 0.9% sodium chloride 250 mL IVPB  1 g IntraVENous PRN  
 bisacodyl (DULCOLAX) suppository 10 mg  10 mg Rectal DAILY PRN  
 senna-docusate (PERICOLACE) 8.6-50 mg per tablet 1 Tab  1 Tab Oral BID  polyethylene glycol (MIRALAX) packet 17 g  17 g Oral DAILY  ELECTROLYTE REPLACEMENT NOTE: Nurse to review Serum Potassium and Magnesuim levels and Initiate Electrolyte Replacement Protocol as needed  1 Each Other PRN  
 magnesium sulfate 1 g/100 ml IVPB (premix or compounded)  1 g IntraVENous PRN  
 glucose chewable tablet 16 g  4 Tab Oral PRN  
 glucagon (GLUCAGEN) injection 1 mg  1 mg IntraMUSCular PRN  
 dextrose 10% infusion 0-250 mL  0-250 mL IntraVENous PRN  
 morphine injection 2 mg  2 mg IntraVENous Q2H PRN  
 melatonin tablet 3 mg  3 mg Oral QHS PRN  
 albumin human 5% (BUMINATE) solution 25 g  25 g IntraVENous Q2H PRN  
 influenza vaccine 2019-20 (6 mos+)(PF) (FLUARIX/FLULAVAL/FLUZONE QUAD) injection 0.5 mL  0.5 mL IntraMUSCular PRIOR TO DISCHARGE  
 [Held by provider] aspirin delayed-release tablet 81 mg  81 mg Oral DAILY  tamsulosin (FLOMAX) capsule 0.8 mg  0.8 mg Oral DAILY  allopurinol (ZYLOPRIM) tablet 100 mg  100 mg Oral DAILY Thank you for allowing me to participate in this patient's care. TIERRA Nunes [address]115 [address], [address] Phone: [phone] Fax: [phone]

## 2019-11-10 NOTE — PROGRESS NOTES
0730 Bedside shift change report given to Jasbir (oncoming nurse) by Vernell Kang (offgoing nurse). Report included the following information SBAR, Kardex, Procedure Summary, Intake/Output, MAR, Recent Results and Cardiac Rhythm NSR. 
 
0800 Pt bathed self, walked out of ccu around first bank of elevators. Educated on bowel prep 
 
1700 Impella plastic end of purge line broke. New bag and cassette placed. 1930 Bedside shift change report given to Kiah (oncoming nurse) by Jasbir (offgoing nurse). Report included the following information Procedure Summary, Intake/Output, MAR, Recent Results and Cardiac Rhythm Paced.

## 2019-11-11 NOTE — ANESTHESIA POSTPROCEDURE EVALUATION
Post-Anesthesia Evaluation and Assessment Patient: Marina Michael MRN: [id number]  SSN: [id number] YOB: 1950  Age: 71 y.o. Sex: male I have evaluated the patient and they are stable and ready for discharge from the PACU. Cardiovascular Function/Vital Signs Visit Vitals /80 Pulse (!) 103 Temp 36.7 °C (98 °F) Resp 27 Ht 6' 2\" (1.88 m) Wt 89.8 kg (198 lb) SpO2 96% BMI 25.42 kg/m² Patient is status post MAC anesthesia for Procedure(s): ESOPHAGOGASTRODUODENOSCOPY (EGD) COLONOSCOPY at bedside. Nausea/Vomiting: None Postoperative hydration reviewed and adequate. Pain: 
Pain Scale 1: Numeric (0 - 10) (11/11/19 1340) Pain Intensity 1: 0 (11/11/19 1340) Managed Neurological Status:  
Neuro Neurologic State: Alert (11/11/19 0800) Orientation Level: Oriented X4 (11/11/19 0800) Cognition: Appropriate decision making; Appropriate safety awareness; Follows commands (11/11/19 0800) LLE Motor Response: Purposeful (11/11/19 0800) RLE Motor Response: Purposeful (11/11/19 0800) At baseline Mental Status, Level of Consciousness: Alert and  oriented to person, place, and time Pulmonary Status:  
O2 Device: Room air (11/11/19 1340) Adequate oxygenation and airway patent Complications related to anesthesia: None Post-anesthesia assessment completed. No concerns Signed By: Alhaji Davis MD   
 November 11, 2019 Procedure(s): ESOPHAGOGASTRODUODENOSCOPY (EGD) COLONOSCOPY at bedside. MAC 
 
<BSHSIANPOST> Vitals Value Taken Time /82 11/11/2019  1:45 PM  
Temp 36.7 °C (98 °F) 11/11/2019  1:30 PM  
Pulse 103 11/11/2019  1:46 PM  
Resp 20 11/11/2019  1:46 PM  
SpO2 92 % 11/11/2019  1:46 PM  
Vitals shown include unvalidated device data.

## 2019-11-11 NOTE — PROGRESS NOTES
0730 Received verbal bedside report from KATRIN Chadwick. Assumed care of the pt. 
 
1210 Impella alarming, position unknown. Impella placement checked, sleeve disconnected and pulled back about 6cm. Dr. Bridget Dailey, cardiac surgery and echo tech paged. 65 Dr. Bridget Dailey at bedside to adjust impella placement. Echo tech paged again. [address] Dr. Angelito Vasquez, urology at the bedside. Orders received to stop CBI at this time but to leave supplies at the bedside to restart if needed. 1245 Impella placement adjusted to 42.5cm. 
 
1600 Impella dressing changed. 1930 Bedside and Verbal shift change report given to Bobbi Correa RN (oncoming nurse) by Matteo Henriquez RN (offgoing nurse). Report included the following information SBAR, Kardex, Procedure Summary, Intake/Output, MAR, Recent Results and Cardiac Rhythm V paced.

## 2019-11-11 NOTE — PROGRESS NOTES
[facility] 
[address] [facility], [address] GI PROGRESS NOTE Wu Mejía, [address] office [phone] NP/PA in-hospital cell phone M-F until 4:30PM 
After 5PM or on weekends, please call  for physician on call NAME: Ciro Bradford :  1950 MRN:  [id number] Subjective: He tolerated prep, reports clear stool. He is NPO. No abdominal pain, nausea, or signs of GI bleeding. Objective: VITALS:  
Last 24hrs VS reviewed since prior progress note. Most recent are: 
Visit Vitals BP (!) 108/91 Pulse 70 Temp 97.8 °F (36.6 °C) Resp 12 Ht 6' 2\" (1.88 m) Wt 90 kg (198 lb 6.6 oz) SpO2 97% BMI 25.47 kg/m² PHYSICAL EXAM: 
General: Cooperative, no acute distress   
Neurologic:  Alert and oriented HEENT: EOMI, no scleral icterus Lungs:  Diminished bilaterally anteriorly Heart:  S1 S2 Abdomen: Soft, non-distended, no tenderness, no guarding, no rebound. +Bowel sounds. Extremities: Warm Psych:   Not anxious or agitated Lab Data Reviewed:  
 
Recent Results (from the past 24 hour(s)) GLUCOSE, POC Collection Time: 11/10/19 12:20 PM  
Result Value Ref Range Glucose (POC) 108 (H) 65 - 100 mg/dL Performed by Annmarie Atkinson GLUCOSE, POC Collection Time: 11/10/19  9:30 PM  
Result Value Ref Range Glucose (POC) 139 (H) 65 - 100 mg/dL Performed by Forest Libman LD Collection Time: 19  4:37 AM  
Result Value Ref Range  (H) 85 - 241 U/L  
NT-PRO BNP Collection Time: 19  4:37 AM  
Result Value Ref Range NT pro-BNP 6,348 (H) <125 PG/ML  
LACTIC ACID Collection Time: 19  4:37 AM  
Result Value Ref Range Lactic acid 0.8 0.4 - 2.0 MMOL/L  
PROTHROMBIN TIME + INR Collection Time: 19  4:37 AM  
Result Value Ref Range INR 1.2 (H) 0.9 - 1.1 Prothrombin time 11.9 (H) 9.0 - 11.1 sec PTT Collection Time: 19  4:37 AM  
Result Value Ref Range aPTT 27.8 22.1 - 32.0 sec  
 aPTT, therapeutic range     58.0 - 77.0 SECS  
MAGNESIUM Collection Time: 11/11/19  4:37 AM  
Result Value Ref Range Magnesium 2.1 1.6 - 2.4 mg/dL CBC W/O DIFF Collection Time: 11/11/19  4:37 AM  
Result Value Ref Range WBC 4.4 4.1 - 11.1 K/uL  
 RBC 2.86 (L) 4.10 - 5.70 M/uL HGB 8.5 (L) 12.1 - 17.0 g/dL HCT 27.5 (L) 36.6 - 50.3 % MCV 96.2 80.0 - 99.0 FL  
 MCH 29.7 26.0 - 34.0 PG  
 MCHC 30.9 30.0 - 36.5 g/dL RDW 21.0 (H) 11.5 - 14.5 % PLATELET 93 (L) 651 - 400 K/uL MPV 9.7 8.9 - 12.9 FL  
 NRBC 0.0 0  WBC ABSOLUTE NRBC 0.00 0.00 - 0.01 K/uL METABOLIC PANEL, COMPREHENSIVE Collection Time: 11/11/19  4:37 AM  
Result Value Ref Range Sodium 136 136 - 145 mmol/L Potassium 3.3 (L) 3.5 - 5.1 mmol/L Chloride 101 97 - 108 mmol/L  
 CO2 28 21 - 32 mmol/L Anion gap 7 5 - 15 mmol/L Glucose 76 65 - 100 mg/dL BUN 13 6 - 20 MG/DL Creatinine 0.97 0.70 - 1.30 MG/DL  
 BUN/Creatinine ratio 13 12 - 20 GFR est AA >60 >60 ml/min/1.73m2 GFR est non-AA >60 >60 ml/min/1.73m2 Calcium 8.6 8.5 - 10.1 MG/DL Bilirubin, total 1.4 (H) 0.2 - 1.0 MG/DL  
 ALT (SGPT) 24 12 - 78 U/L  
 AST (SGOT) 28 15 - 37 U/L Alk. phosphatase 122 (H) 45 - 117 U/L Protein, total 5.4 (L) 6.4 - 8.2 g/dL Albumin 2.2 (L) 3.5 - 5.0 g/dL Globulin 3.2 2.0 - 4.0 g/dL A-G Ratio 0.7 (L) 1.1 - 2.2 EKG, 12 LEAD, INITIAL Collection Time: 11/11/19  4:49 AM  
Result Value Ref Range Ventricular Rate 74 BPM  
 Atrial Rate 39 BPM  
 QRS Duration 158 ms Q-T Interval 504 ms QTC Calculation (Bezet) 559 ms Calculated R Axis 13 degrees Calculated T Axis 175 degrees Diagnosis Electronic ventricular pacemaker When compared with ECG of 05-NOV-2019 10:44, 
Vent. rate has decreased BY   9 BPM 
Confirmed by Neto Mejia M.D., Jon Ríos ([id number]) on 11/11/2019 8:54:27 AM 
  
TYPE & SCREEN  Collection Time: 11/11/19  5:00 AM  
 Result Value Ref Range Crossmatch Expiration 11/14/2019 ABO/Rh(D) O POSITIVE Antibody screen POS Comment Previously identifiedn Nonspecific Cold Antibody and Nonspecific Antibody Antibody ID NO ADDITIONAL ANTIBODIES DETECTED Unit number [id number] Blood component type RC LR Unit division 00 Status of unit ALLOCATED Crossmatch result Compatible Assessment:  
· LVAD evaluation: Impella in place. · Iron deficiency anemia: Hgb 8.5, platelets 93. Bivalirudin on hold. No signs of active GI bleeding.  · Thrombocytopenia · History of colon polyps: Colonoscopy in 2009 with reported tubular adenomas. · Acute on chronic systolic heart failure · History of VF arrest status post AICD · Coronary artery disease status post CABG in 2010 · Atrial fibrillation · Acute kidney injury on chronic kidney disease · Urinary tract infection/hematuria: ID following. Patient Active Problem List  
Diagnosis Code  Paroxysmal atrial fibrillation (HCC) I48.0  Acute on chronic systolic CHF (congestive heart failure) (HCC) I50.23  Systolic CHF, chronic (HCC) I50.22  
 Peripheral vascular disease (HCC) I73.9  Acute decompensated heart failure (HCC) I50.9 Plan: · Monitor CBC and transfuse as necessary · NPO 
· Plan for EGD and colonoscopy today at bedside at noon with Dr. Clayton Colin, anesthesia requested - discussed with LVAD NP this morning Signed By: Corrinne Brackett, NP   
 11/11/2019  2:19 PM  
 
 I have examined the patient. I have reviewed the chart and agree with the documentation recorded by the NP, including the assessment, treatment plan, and disposition. EGD and colonoscopy, discussed risks and benefits, agreeable Zaire Agarwal MD

## 2019-11-11 NOTE — PROGRESS NOTES
LVAD/ Transplant Caregiver Assessment 11/11/2019 [address] Relationship to the Patient: Spouse x 45 years Name of Debra N 9Kim Avenzo Phone Number: Cell # [phone] 1. Have you been a caregiver in the past? Yes 
 
2. What are your expectations as a caregiver? \"Mostly to make sure he eats. ..he's been struggling with that\" 3. Do you work? No, retired 4. Do you feel that you are physically and mentally able to care for your love one? Yes 5. Are you willing to commit to meeting with the LVAD NP during your loved ones hospitalization for LVAD training and to be educated on driveline dressing changes? This could include several sessions including meeting at the hospital between 9:00am to 5:00pm.  Yes 6. Are you willing to change your love ones dressing three times a week? Yes 
 
7. Do you have a valid s license and reliable transportation? Yes 
 
8. Do you feel that being a caregiver will be a financial burden? No 
 
9. In the past, how have you coped with life changing events? \"It takes a little while for me to get adjusted but I do - rely on the lord\" 10. Do you have reliable friends or family you can call upon when you need a Lelia [place] from being a caregiver, run errands, or attend to personal matters? Yes 11. Do you have any concerns about being a caregiver? No 
 
 
 met with Sona and Laura Puente to complete the caregiver assessment for LVAD candidacy. Laura Puente was polite, engaging and acknowledged she's been concerned about the pump but feels she's coming around to it. The experience will be new for her and she wants for Sona to do well. She met with a current LVAD patient's spouse which she felt was helpful but she is concerned about the unknown. Acknowledged this will be a new experience for her and Sona and require a lifestyle change.  Talked about partializing the education and relying on her family members and friends for support when she feels she needs a break. Kristina Alcocer appears to be an adequate caregiver for Patience King. Moise Sanderson, MSW, LCSW Clinical  [facility] [place] [phone]

## 2019-11-11 NOTE — PROGRESS NOTES
Met with Sona and Ivette Ivory earlier today. Ivette Ivory appears to be in better spirits with regard to LVAD implant. She shared she's coming to terms with the need for the pump. Completed an AMD with Sona which is on file and provided copies to his wife for her to retain/give to their children. Talked with them about [email or web address] - [address] for his previous inotropic support. Shared they are being LVAD trained to continue providing care to him once discharged from the hospital as [facility] home health does not service his home address. Also completed the caregiver assessment and home inspection checklist. Will continue to follow and offer support. Prescription benefit information: 
 
 called [employer] at # [phone] regarding Sona's lack of having a prescription drug card. Per Claribel Goodwin with [employer] Sona's prescription drug information is as follows: 
 
Member ID # [id number] BIN # [id number] PCN # [id number] Group # [id number] Prior authorization # [id number]; encouraged to call for prior authorizations (will take 5 -10 minutes) as opposed to 5 business days via fax/ cover my meds. Will notify LVAD RN. Mamitzi Iban has local retail coverage for 30 day supply & 90 day supply via mail order. SUSHIL Crandall, [facility] Clinical  [place] [phone]

## 2019-11-11 NOTE — PROGRESS NOTES
Problem: Falls - Risk of 
Goal: *Absence of Falls Description Document Asim Reas Fall Risk and appropriate interventions in the flowsheet. Outcome: Progressing Towards Goal 
Note:  
Fall Risk Interventions: 
Mobility Interventions: Communicate number of staff needed for ambulation/transfer, Patient to call before getting OOB Mentation Interventions: Adequate sleep, hydration, pain control, Door open when patient unattended, Evaluate medications/consider consulting pharmacy, Increase mobility, More frequent rounding, Room close to nurse's station, Toileting rounds Medication Interventions: Assess postural VS orthostatic hypotension, Evaluate medications/consider consulting pharmacy, Patient to call before getting OOB, Teach patient to arise slowly Elimination Interventions: Call light in reach, Patient to call for help with toileting needs, Toilet paper/wipes in reach, Toileting schedule/hourly rounds History of Falls Interventions: Consult care management for discharge planning, Door open when patient unattended, Evaluate medications/consider consulting pharmacy, Investigate reason for fall, Room close to nurse's station Problem: Patient Education: Go to Patient Education Activity Goal: Patient/Family Education Outcome: Progressing Towards Goal 
  
Problem: Pain Goal: *Control of Pain Outcome: Progressing Towards Goal 
Goal: *PALLIATIVE CARE:  Alleviation of Pain Outcome: Progressing Towards Goal 
  
Problem: Patient Education: Go to Patient Education Activity Goal: Patient/Family Education Outcome: Progressing Towards Goal 
  
Problem: Pressure Injury - Risk of 
Goal: *Prevention of pressure injury Description Document Mich Scale and appropriate interventions in the flowsheet.  
Outcome: Progressing Towards Goal 
Note:  
Pressure Injury Interventions: 
Sensory Interventions: Float heels, Keep linens dry and wrinkle-free, Maintain/enhance activity level, Minimize linen layers, Monitor skin under medical devices, Pressure redistribution bed/mattress (bed type), Turn and reposition approx. every two hours (pillows and wedges if needed) Moisture Interventions: Absorbent underpads, Maintain skin hydration (lotion/cream), Minimize layers, Moisture barrier, Internal/External urinary devices Activity Interventions: Increase time out of bed, Pressure redistribution bed/mattress(bed type) Mobility Interventions: Float heels, HOB 30 degrees or less, Pressure redistribution bed/mattress (bed type), Turn and reposition approx. every two hours(pillow and wedges) Nutrition Interventions: Document food/fluid/supplement intake, Discuss nutritional consult with provider Friction and Shear Interventions: Apply protective barrier, creams and emollients, HOB 30 degrees or less, Lift sheet, Minimize layers, Transferring/repositioning devices Problem: Patient Education: Go to Patient Education Activity Goal: Patient/Family Education Outcome: Progressing Towards Goal 
  
Problem: Infection - Risk of, Multi-drug Resistant Organism Colonization (MDRO) Goal: *Absence of MDRO colonization Outcome: Progressing Towards Goal 
Goal: *Absence of infection signs and symptoms Outcome: Progressing Towards Goal 
  
Problem: Patient Education: Go to Patient Education Activity Goal: Patient/Family Education Outcome: Progressing Towards Goal 
  
Problem: Patient Education: Go to Patient Education Activity Goal: Patient/Family Education Outcome: Progressing Towards Goal 
  
Problem: Heart Failure: Day 5 Goal: Off Pathway (Use only if patient is Off Pathway) Outcome: Progressing Towards Goal 
Goal: Activity/Safety Outcome: Progressing Towards Goal 
Goal: Diagnostic Test/Procedures Outcome: Progressing Towards Goal 
Goal: Nutrition/Diet Outcome: Progressing Towards Goal 
Goal: Discharge Planning Outcome: Progressing Towards Goal 
 Goal: Medications Outcome: Progressing Towards Goal 
Goal: Respiratory Outcome: Progressing Towards Goal 
Goal: Treatments/Interventions/Procedures Outcome: Progressing Towards Goal 
Goal: Psychosocial 
Outcome: Progressing Towards Goal 
  
Problem: Heart Failure: Discharge Outcomes Goal: *Demonstrates ability to perform prescribed activity without shortness of breath or discomfort Outcome: Progressing Towards Goal 
Goal: *Left ventricular function assessment completed prior to or during stay, or planned for post-discharge Outcome: Progressing Towards Goal 
Goal: *ACEI prescribed if LVEF less than 40% and no contraindications or ARB prescribed Outcome: Progressing Towards Goal 
Goal: *Verbalizes understanding and describes prescribed diet Outcome: Progressing Towards Goal 
Goal: *Verbalizes understanding/describes prescribed medications Outcome: Progressing Towards Goal 
Goal: *Describes available resources and support systems Description 
(eg: Home Health, Palliative Care, Advanced Medical Directive) Outcome: Progressing Towards Goal 
Goal: *Describes smoking cessation resources Outcome: Progressing Towards Goal 
Goal: *Understands and describes signs and symptoms to report to providers(Stroke Metric) Outcome: Progressing Towards Goal 
Goal: *Describes/verbalizes understanding of follow-up/return appt Description 
(eg: to physicians, diabetes treatment coordinator, and other resources Outcome: Progressing Towards Goal 
Goal: *Describes importance of continuing daily weights and changes to report to physician Outcome: Progressing Towards Goal 
  
Problem: Diabetes Self-Management Goal: *Disease process and treatment process Description Define diabetes and identify own type of diabetes; list 3 options for treating diabetes. Outcome: Progressing Towards Goal 
Goal: *Incorporating nutritional management into lifestyle Description Describe effect of type, amount and timing of food on blood glucose; list 3 methods for planning meals. Outcome: Progressing Towards Goal 
Goal: *Incorporating physical activity into lifestyle Description State effect of exercise on blood glucose levels. Outcome: Progressing Towards Goal 
Goal: *Developing strategies to promote health/change behavior Description Define the ABC's of diabetes; identify appropriate screenings, schedule and personal plan for screenings. Outcome: Progressing Towards Goal 
Goal: *Using medications safely Description State effect of diabetes medications on diabetes; name diabetes medication taking, action and side effects. Outcome: Progressing Towards Goal 
Goal: *Monitoring blood glucose, interpreting and using results Description Identify recommended blood glucose targets  and personal targets. Outcome: Progressing Towards Goal 
Goal: *Prevention, detection, treatment of acute complications Description List symptoms of hyper- and hypoglycemia; describe how to treat low blood sugar and actions for lowering  high blood glucose level. Outcome: Progressing Towards Goal 
Goal: *Prevention, detection and treatment of chronic complications Description Define the natural course of diabetes and describe the relationship of blood glucose levels to long term complications of diabetes. Outcome: Progressing Towards Goal 
Goal: *Developing strategies to address psychosocial issues Description Describe feelings about living with diabetes; identify support needed and support network Outcome: Progressing Towards Goal 
Goal: *Insulin pump training Outcome: Progressing Towards Goal 
Goal: *Sick day guidelines Outcome: Progressing Towards Goal 
Goal: *Patient Specific Goal (EDIT GOAL, INSERT TEXT) Outcome: Progressing Towards Goal 
  
Problem: Patient Education: Go to Patient Education Activity Goal: Patient/Family Education Outcome: Progressing Towards Goal 
  
 Problem: Patient Education: Go to Patient Education Activity Goal: Patient/Family Education Outcome: Progressing Towards Goal 
  
Problem: Discharge Planning Goal: *Discharge to safe environment Outcome: Progressing Towards Goal 
  
Problem: Patient Education: Go to Patient Education Activity Goal: Patient/Family Education Outcome: Progressing Towards Goal 
  
Problem: Infection - Risk of, Surgical Site Infection Goal: *Absence of surgical site infection signs and symptoms Outcome: Progressing Towards Goal 
  
Problem: Patient Education: Go to Patient Education Activity Goal: Patient/Family Education Outcome: Progressing Towards Goal 
  
Problem: Infection - Risk of, Surgical Site Infection Goal: *Absence of surgical site infection signs and symptoms Outcome: Progressing Towards Goal 
  
Problem: Patient Education: Go to Patient Education Activity Goal: Patient/Family Education Outcome: Progressing Towards Goal 
  
Problem: Patient Education: Go to Patient Education Activity Goal: Patient/Family Education Outcome: Progressing Towards Goal 
  
Problem: Nutrition Deficit Goal: *Optimize nutritional status Outcome: Progressing Towards Goal 
  
Problem: Nutrition Deficit Goal: *Optimize nutritional status Outcome: Progressing Towards Goal 
  
Problem: Nutrition Deficit Goal: *Optimize nutritional status Outcome: Progressing Towards Goal

## 2019-11-11 NOTE — PROGRESS NOTES
Bedside and Verbal shift change report given to Kiah (oncoming nurse) by Russel Jensen (offgoing nurse). Report included the following information SBAR, Kardex, Intake/Output, MAR, Accordion, Recent Results, Cardiac Rhythm -Paced and Alarm Parameters . 2000 - Assumed care. CHG bath already given. Plan of care reviewed w/ pt. 
0300 - AM labs drawn. AM CXR & EKG obtained. 0600 - Pt's son at bedside. 0700 - Dr. Cristal Fallon at bedside, update given. Bedside and Verbal shift change report given to Vijaya (oncoming nurse) by Nile Reina (offgoing nurse). Report included the following information SBAR, Kardex, Intake/Output, MAR, Accordion, Recent Results, Cardiac Rhythm -Paced and Alarm Parameters .

## 2019-11-11 NOTE — PROGRESS NOTES
Urology Progress Note Patient: Sarah Bass MRN: [id number]  SSN: [id number] YOB: 1950  Age: 71 y.o. Sex: male ADMITTED: 10/25/2019 to Christy Aranda MD for [facility] decomp[facility] [I50.9] POD# Day of Surgery Procedure(s): ESOPHAGOGASTRODUODENOSCOPY (EGD) COLONOSCOPY at bedside Urine completely clear with minimal CBI going. Vitals: Temp (24hrs), Av.9 °F (36.6 °C), Min:97.7 °F (36.5 °C), Max:98 °F (36.7 °C) Blood pressure 96/65, pulse 75, temperature 98 °F (36.7 °C), resp. rate 20, height 6' 2\" (1.88 m), weight 89.8 kg (198 lb), SpO2 90 %. Intake and Output: 
 1901 -  0700 In: 2705.5 [P.O.:1500; I.V.:1205.5] Out: 8082 Labs: 
Labs:  
Lab Results Component Value Date/Time WBC 4.4 2019 04:37 AM  
 HGB 8.5 (L) 2019 04:37 AM  
 Creatinine 0.97 2019 04:37 AM  
 
 
 
Assessment/Plan: 
 Urine clear. Stop CBI. Hand irrigate neal PRN. Signed By: Doreen Bruno MD - 2019

## 2019-11-11 NOTE — PROGRESS NOTES
Problem: Mobility Impaired (Adult and Pediatric) Goal: *Acute Goals and Plan of Care (Insert Text) Description FUNCTIONAL STATUS PRIOR TO ADMISSION: Patient was modified independent using a single point cane for functional mobility. Patient reports an increasingly sedentary lifestyle 2* fatigue and SOB/dyspnea. Retired (so is his wife). Patient reports x 3 falls within the last couple of weeks. Patient is wearing either nasal cannula or CPAP at night. LVAD work-up has been initiated. HOME SUPPORT PRIOR TO ADMISSION: The patient lived with his wife, but did not require physical assistance. Physical Therapy Goals Initiated 10/27/2019 1. Patient will move from supine to sit and sit to supine, scoot up and down and roll side to side in bed with independence within 7 days. 2.  Patient will perform sit to/from stand with supervision/set-up within 7 days. 3.  Patient will ambulate 150 feet with least restrictive assistive device and supervision/set-up within 7 days. 4.  Patient will ascend/descend 4 stairs with  handrail(s) with supervision/set-up within 7 days for functional strengthening and community reintegration. Lilia Tomas 5.  Patient will verbally and functionally recall 3/3 sternal precautions within 7 days in preparation for LVAD implantation. 6.  Patient will perform a mock power exchange for power module to/from battery with supervision/set-up within 7 days in preparation for LVAD implantation. Outcome: Progressing Towards Goal 
 
PHYSICAL THERAPY TREATMENT Patient: Zay Wheeler (75 y.o. male) Date: 11/11/2019 Diagnosis: Acute decompensated heart failure ([facility] [I50.9] <principal problem not specified> Procedure(s) (LRB): 
RIGHT AXILLARY IMPELLA INSERTION (Right) 13 Days Post-Op Precautions: Fall(R axillary impella) Chart, physical therapy assessment, plan of care and goals were reviewed. ASSESSMENT Patient continues with skilled PT services and is progressing towards goals. Pt had decrease LE stability requiring increase assistance for stability. Pt is scheduled for a colonoscopy today and has been NPO. Pt is motivated to continue progression Current Level of Function Impacting Discharge (mobility/balance): min A for gait with rolling walker Other factors to consider for discharge: LVAD workup PLAN : 
Patient continues to benefit from skilled intervention to address the above impairments. Continue treatment per established plan of care. to address goals. Recommendation for discharge: (in order for the patient to meet his/her long term goals) To be determined: This discharge recommendation: 
Has not yet been discussed the attending provider and/or case management IF patient discharges home will need the following DME: to be determined (TBD) SUBJECTIVE:  
Patient stated I can't do as much today.  OBJECTIVE DATA SUMMARY:  
Critical Behavior: 
Neurologic State: Alert Orientation Level: Oriented X4 Cognition: Appropriate decision making, Appropriate safety awareness, Follows commands Safety/Judgement: Awareness of environment, Insight into deficits Functional Mobility Training: 
Bed Mobility: 
  
Supine to Sit: Supervision Transfers: 
Sit to Stand: Contact guard assistance Stand to Sit: Contact guard assistance Balance: 
Sitting: Intact; With support Standing: Impaired Standing - Static: Fair Standing - Dynamic : Fair Ambulation/Gait Training: 
Distance (ft): 200 Feet (ft) Assistive Device: Gait belt;Walker, rolling Ambulation - Level of Assistance: Minimal assistance Gait Abnormalities: Decreased step clearance Stairs: Therapeutic Exercises:  
 
Pain Rating: No complaints Activity Tolerance:  
limited Please refer to the flowsheet for vital signs taken during this treatment. After treatment patient left in no apparent distress: Sitting in chair and Call bell within reach COMMUNICATION/COLLABORATION:  
The patients plan of care was discussed with: Registered Nurse Jose Roberto Cortes PTA Time Calculation: 24 mins

## 2019-11-11 NOTE — PROCEDURES
[address] 
[address] [facility], [address] [place] [address] Colonoscopy Operative Report [id number] [id number] 1950 Procedure Type:   Colonoscopy --diagnostic Indications:    Iron deficiency anemia Pre-operative Diagnosis: see indication above Post-operative Diagnosis:  See findings below :  Sanchez Tsai MD 
 
Surgical Assistant: None Implants:  None Referring Provider: Diya Salinas MD 
 
 
Sedation:  MAC anesthesia Propofol Procedure Details:  After informed consent was obtained with all risks and benefits of procedure explained and preoperative exam completed, the patient was taken to the endoscopy suite and placed in the left lateral decubitus position. Upon sequential sedation as per above, a digital rectal exam was performed demonstrating internal hemorrhoids. The Olympus videocolonoscope  was inserted in the rectum and carefully advanced to the cecum, which was identified by the ileocecal valve and appendiceal orifice. The cecum was identified by the ileocecal valve and appendiceal orifice. The quality of preparation was adequate. The colonoscope was slowly withdrawn with careful evaluation between folds. Retroflexion in the rectum was completed . Findings:  
Rectum: normal 
Sigmoid: moderate diverticulosis Descending Colon: mild diverticulosis Transverse Colon: normal 
Ascending Colon: normal 
Cecum: normal 
Liquid stool seen throughout colon, suctioned out Specimen Removed:  none Complications: None. EBL:  None. Impression:    see findings Recommendations: --resume PO No need for further GI testing at this time since no GI  bleeding Will follow Colonoscopy in 5 years Signed By: Sanchez Tsai MD   
 11/11/2019  1:29 PM

## 2019-11-11 NOTE — PROGRESS NOTES
NYHA class IV A/C systolic heart failure Low EF (10%) Inotrope dependent Malnutrition  
LVAD Work-up Epistaxis Hematuria 
  
Pre-Op Plan (11/18/19 implant) : 
1) Platelets a little low - will need to be above 150 before surgery 2) PA catheter Thursday evening 3) Hold bivalirudin at [address] to D5) 4) Start Vanc when we get in the room 5) Make sure bend relief lock is in place 6) Start drips after we finish the inflow cannula 7) Needs colonoscopy 8) Needs [facility]  
  
  
Op Plan: 
1) Will not need sternal saw - can go right to incision and dissection (need ANDREA bar's) 2) Dissection:  
            M. The RV rises above level of posterior sternal plate inferiorly (start high) 
            B. The innominate vein is unusually low (can feel for the wires) 
            C. Will likely need to incorporate proximal CABG anastomosis in side-biter                  for outflow graft             S. LIMA to LAD graft is fairly lateral and should be out of the way             E. Will need to get aorta and LV apex out as usual 
            F. Place drive-line (somewhat thin adipose tissue) 3) Cannulation (give heparin): 
            F. Left groin 25 Fr venous (will save the right in case we need RVAD;  
                can place triple lumen early) 
            B. Build 10 mm graft side-arm off Impella graft (glue; will need to extend                  axiallary graft back); have 3/8 connector already attached  
4) Implant: 
            D. Remove Impella, de-clot graft (#4 Shakila), back-flush, hook up to bypass  
                TCQENLZ, go on bypass (place extra sucker in axillary wound             U. Packs, find apex, and core (remove trabeculations; suture in CO2 and                  pump sucker lines) 
            C. Place (4) 2-0 Ethibond sutures, bring them through inflow cannula sewing  
                 ring, and tie down 
            D. Place (2) 4-0 SH running sutures, bioglue             E. Attach pump, remove packs, and lower into well 
            F. Clamp outflow graft, turn on drips, and turn on pump at 3000 
            G. Measure outflow graft (add 1 cm), side-biter, aortotomy, and outflow graft                  anastomosis             H. Place de-airing stitch and remove clamps  
            I. Come off bypass and up on LVAD as usual 
            J. Staple off axillary graft  
  
Chest / Abdominal CT scan:  
  
Sternum is  although it looks like he still has sternal bone; the right half is higher than the left half and will need to some down; should be able to close with 12 standard wires although the sternum is thin; back-up plan would be a weave  
  
RV is coming above the sternal edge somewhat at the inferior margin so need to be careful here 
  
Proximal comes off anterior portion of the aorta; may need to place side-biter such that it incorporates the proximal; needs a LHC to figure out which way the graft is going; no significant calcium in the ascending aorta; Impella in place  
  
LIMA to LAD graft is fairly lateral so should be out of the way for dissection  
  
Right atrium looks a little big 
  
Innominate vein is a little low so need to be carefull with dissection (should be able to feel the pace leads)  
  Bilateral common iliac arteries are severely calcified and narrowed down into the 4 mm range; will be somewhat hazardous trying to place femoral arterial cannula; will be best to extend out impella graft, build 10 mm side-arm, and go on through the axillary artery; will need to flush out the graft after we pull the Impella and before going on bypass (timing will be important here); will need to place the femoral venous line first 
  
Abdominal wall adipose tissue is thin so need to be careful with drive-line  
  
LHC - pending 
  
TTE - severely dilated LV cavity (7.36 cm) with reduced EF (10%); mild MR, large left atrium; previous TTE's did not reveal any AI; has moderate RV dysfunction (RVIDD 5.4, RV/LV ratio 0.73, TAPSE 1.4, mild TR; good free wall motion); little worried that sternal closure with compress his RV so will be prepare for temp RVAD support  
  
Carotids - normal 
  
ABIs - normal  
  
PFT's - FEV-1 2.25 (59% predicted)  
  
Epistaxis - resolved  
  
Hematuria - resolved  
  
Hgb 8.2, platelets 98, INR 1.1 
  
BUN 15, creatinine 1.13 
  
Bilirubin 1.3, ALT 28, AST 35, alk phos 130 
  
LDH - 438, lactic acid 0.9 
  
NT pro-BNP - 7103  
  
CEA - 6.6 (mildy elevated)  
  
CXR - mild pulmonary edema 
  
Impella - flow 3 L/min @ P-6 Bowel prep overnight EGD and colonoscopy today Hgb looks good Platelets a little low Creatinine normal 
 
Bilirubin and other LFTs look good Lactic acid normal 
 
LDH looks good NT pro-BNP about the same CXR - minimal pulmonary edema Addendum: 
 
Impella malposition alarm Out of the LV on TTE Placed back in LV cavity Intake/Output Summary (Last 24 hours) at 11/11/2019 1411 Last data filed at 11/11/2019 1328 Gross per 24 hour Intake 1421.8 ml Output 4300 ml Net -2878.2 ml Visit Vitals /80 Pulse (!) 103 Temp 98 °F (36.7 °C) Resp 27 Ht 6' 2\" (1.88 m) Wt 198 lb (89.8 kg) SpO2 96% BMI 25.42 kg/m² Risk of morbidity and mortality - high Medical decision making - high complexity Total critical care time - 30 minutes (CPT 63345)

## 2019-11-11 NOTE — PROGRESS NOTES
JESÚS Plan: 
  Patient remains in progress for LVAD workup; CCU with Impella Patient scheduled for full GI scope today; C in next couple of days CM spoke briefly with patient this morning. Patient states he feels good today; ambulating with PT in the hallway with standby assistance; gait belt; walker. CM received update from [facility].  Patient continues to be worked up for LVAD. If all procedures are completed and patient cleared for surgery, tentative LVAD implant date is 11/18/19. Family meeting between [facility] and patient's son, Shaw Springer, is scheduled for Thursday. CM will continue to follow.  
 
France Dukes, MPH

## 2019-11-11 NOTE — PROGRESS NOTES
[facility] ICU Progress Note Admit Date: 10/25/2019 POD:  10 Day Post-Op Procedure:  Procedure(s): RIGHT AXILLARY IMPELLA INSERTION Subjective:  
Pt seen with Dr. Rin Fraga. On RA. Afebrile. Impella P8, D5 purge. Lizama in [address] [place] [address] per Urology. Objective:  
Vitals: 
Blood pressure 108/78, pulse (!) 110, temperature 97.8 °F (36.6 °C), resp. rate 26, height 6' 2\" (1.88 m), weight 198 lb 6.6 oz (90 kg), SpO2 94 %. Temp (24hrs), Av.8 °F (36.6 °C), Min:97.7 °F (36.5 °C), Max:98 °F (36.7 °C) Hemodynamics: 
 CO: CO (l/min): 8.3 l/min CI: CI (l/min/m2): 4 l/min/m2 CVP: CVP (mmHg): 11 mmHg (19 07) SVR: SVR (dyne*sec)/cm5: 781 (dyne*sec)/cm5 (19 1500) PAP Systolic: PAP Systolic: 45 (59/99/08 4226) PAP Diastolic: PAP Diastolic: 19 (38/05/33 8709) PVR:   
 SV02: SVO2 (%): 51 % (19 1500) SCV02: SCVO2 (%): 75 % (10/29/19 1900) EKG/Rhythm:   
Paced in the 90s Oxygen Therapy: 
Oxygen Therapy O2 Sat (%): 94 % (19 0700) Pulse via Oximetry: 83 beats per minute (19 1500) O2 Device: Room air (11/10/19 2000) O2 Flow Rate (L/min): 4 l/min (19 0400) FIO2 (%): 40 % (10/30/19 0846) CXR:  
CXR Results  (Last 48 hours)  
          
 19 0505  XR CHEST PORT Final result Impression:  IMPRESSION:  
No interval change. Narrative:  PORTABLE CHEST RADIOGRAPH/S: 2019 5:05 AM  
   
Clinical history: Postoperative heart INDICATION:   postop heart COMPARISON: 11/10/2019 FINDINGS:  
AP portable upright view of the chest demonstrates a stable  cardiopericardial  
silhouette. The lungs are adequately expanded. There is no edema, effusion,  
consolidation, or pneumothorax. Cardiac assist device is stable in appearance. Cardiac pacer unchanged. Right-sided PICC line catheter unchanged. Stable  
minimal interstitial opacity. . Patient is on a cardiac monitor. 11/10/19 0457  XR CHEST PORT Final result Impression:  IMPRESSION:  
No significant change in mild pulmonary edema. Narrative:  INDICATION: postop heart EXAMINATION:  AP CHEST, PORTABLE  
   
COMPARISON: 2019 FINDINGS: Single AP portable view of the chest at 0407 hours demonstrates no  
change in position of the lines and tubes. The cardiomediastinal silhouette is  
unchanged. No significant change in mild pulmonary edema. No pneumothorax. Admission Weight: Last Weight Weight: 192 lb 10.9 oz (87.4 kg) Weight: 198 lb 6.6 oz (90 kg) Intake / Output / Drain: 
Current Shift: No intake/output data recorded. Last 24 hrs.:  
 
Intake/Output Summary (Last 24 hours) at 2019 9205 Last data filed at 2019 0700 Gross per 24 hour Intake 2356.4 ml Output 3500 ml Net -1143.6 ml EXAM: 
General:   NAD. Up in the chair Lungs:   Diminished in the bases. Incision:  Impella dressing C/D/I Heart:  Regular rate and rhythm, S1, S2 normal, no murmur, click, rub or gallop. Abdomen:   Soft, non-tender. Bowel sounds hypoactive. No masses,  No organomegaly. Lizama with gross hematuria Extremities:  +1 bilateral lower extremity pitting edema. PPP. Neurologic:  Gross motor and sensory apparatus intact. Labs:  
Recent Labs 19 
0437 11/10/19 
2130 WBC 4.4  --   
HGB 8.5*  --   
HCT 27.5*  --   
PLT 93*  --   
  --   
K 3.3*  --   
BUN 13  --   
CREA 0.97  --   
GLU 76  --   
GLUCPOC  --  139* INR 1.2*  --   
 
 
 Assessment:  
 
Active Problems: 
  Acute decompensated heart failure ([facility] [address] (10/25/2019) Plan/Recommendations/Medical Decision Makin. Acute on chronic systolic (CHF Class IV) S/P Impella: Has ICD. LV EF 16-20%. No BB/ACE/ARB/AA until appropriate. Bumex 1 mg IV QID-PRN. Trend proBNP, lactate, LDH. Strict I/Os. Daily weights.  LVAD w/u in process. Cefepime changed to zosyn per ID for prophylaxis. Continue D5 purge due to hematuria/epistaxis 2. Thrombocytopenia: Platelets improving at 98K. No asa. HIT negative, LOAN negative. On protonix. Heme following. 3. CAD S/p CABG 2010: Needs LHC, timing TBD, once UTI has cleared. Stop asa d/t hematuria/thrombocytopenia, not on statin historically. No BB D/t pressors/intropes. 4. PAF s/p DCCV 6/2019: Holding eliquis due to hematuria/epistaxis. On PO amio. 5. JUAN J on CKD stage IV: Monitor. Renal following. Diuretics PRN CVP >10. Keep neal. 6. Hx of urinary retention/BPH, hematuria:  On flomax. Neal with CBI per Urology but will likely wean off. Urology following. 7. JOSE: qhs CPAP. 8. Hx of sternal wound infection requring sternectomy: Not a contraindication for future LVAD. Tagged WBC -- showed no abnormal tracer uptake. 9. Iron def anemia: s/p venofer. H&H stable today. On Epogen. Cannot use doxy/octreotide d/t prolonged QTC. Occult blood in stool positive per POC testing. Gastroenterology following for LVAD work up. 10. Vocal cord paralysis: Voice improving. Speech eval PRN. Advance diet as tolerated. 11. Constipation: Resolved last BM 11/6. Continue pericolace, miralax(pt keeps refusing). Continue daily suppository PRN. 12. Serratia UTI/hematuria: Urology following planning to transition off CBI. On Zosyn-ID following. 13. Mediastinal lymphadenopathy:  Per AHF, low threshold for Carcinoma per CT scans. Eventually needs PET scan. 14. Hyponatremia: Resolved-monitor 15. Acute Moderate protein-calorie malnutrition:  Pre-albumin 10/25 was 13.9. Recheck 11/4 was 16.2. Pt eating cardiac diet well. Continue marinol Dispo: Care and orders per AHFS. Remain in CCU. Colonoscopy today, needs LHC this week. Plan for LVAD 11-18-19. Signed By: MALCOLM Zayas Saw patient, agree with above Risk of morbidity and mortality - high Medical decision making - high complexity 1. Acute on chronic systolic (CHF Class IV) S/P Impella:  
 
2. Thrombocytopenia: Platelets improving at 98K. No asa. HIT negative, LOAN negative. On protonix. Heme following. 3. CAD S/p CABG 2010: Needs LHC, timing TBD, once UTI has cleared. Stop asa d/t hematuria/thrombocytopenia, not on statin historically. No BB D/t pressors/intropes. 4. PAF s/p DCCV 6/2019: Holding eliquis due to hematuria/epistaxis. On PO amio. 5. JUAN J on CKD stage IV: Monitor. Renal following. Diuretics PRN CVP >10. Keep neal. 6. Hx of urinary retention/BPH, hematuria:  On flomax. Neal with CBI per Urology but will likely wean off. Urology following. 7. JOSE: qhs CPAP. 8. Hx of sternal wound infection requring sternectomy: Not a contraindication for future LVAD. Tagged WBC -- showed no abnormal tracer uptake. 9. Iron def anemia: s/p venofer. H&H stable today. On Epogen. Cannot use doxy/octreotide d/t prolonged QTC. Occult blood in stool positive per POC testing. Gastroenterology following for LVAD work up. 10. Vocal cord paralysis: Voice improving. Speech eval PRN. Advance diet as tolerated. 11. Constipation: Resolved last BM 11/6. Continue pericolace, miralax(pt keeps refusing). Continue daily suppository PRN. 12. Serratia UTI/hematuria: Urology following planning to transition off CBI. On Zosyn-ID following. 13. Mediastinal lymphadenopathy:  Per AHF, low threshold for Carcinoma per CT scans. Eventually needs PET scan. 14. Hyponatremia: Resolved-monitor 15. Acute Moderate protein-calorie malnutrition:  Pre-albumin 10/25 was 13.9. Recheck 11/4 was 16.2. Pt eating cardiac diet well. Continue marinol

## 2019-11-11 NOTE — ROUTINE PROCESS
TRANSFER - IN REPORT: 
 
Verbal report received from KATRIN Lilly(name) on Sona Kiser  being received from CCU(unit) for ordered procedure Report consisted of patients Situation, Background, Assessment and  
Recommendations(SBAR). Information from the following report(s) SBAR was reviewed with the receiving nurse. Opportunity for questions and clarification was provided. Assessment completed upon patients arrival to unit and care assumed. TRANSFER - OUT REPORT: 
 
Verbal report given to KATRIN Lilly(name) on Billy Ramírez  being transferred to CCU(unit) for routine progression of care Report consisted of patients Situation, Background, Assessment and  
Recommendations(SBAR). Information from the following report(s) Procedure Summary was reviewed with the receiving nurse. Lines: PICC Double Lumen 27/59/58 Right;Basilic (Active) Central Line Being Utilized Yes 11/11/2019  4:00 AM  
Criteria for Appropriate Use Long term IV/antibiotic administration 11/11/2019  4:00 AM  
Site Assessment Clean, dry, & intact 11/11/2019  4:00 AM  
Phlebitis Assessment 0 11/11/2019  4:00 AM  
Infiltration Assessment 0 11/11/2019  4:00 AM  
Arm Circumference (cm) 32.5 cm 6/25/2019  4:49 PM  
Date of Last Dressing Change 11/04/19 11/11/2019  4:00 AM  
Dressing Status Clean, dry, & intact 11/11/2019  4:00 AM  
Action Taken Open ports on tubing capped 11/11/2019  4:00 AM  
External Catheter Length (cm) 0 centimeters 11/8/2019 12:00 PM  
Dressing Type Disk with Chlorhexadine gluconate (CHG); Transparent 11/11/2019  4:00 AM  
Hub Color/Line Status Red; Infusing 11/11/2019  4:00 AM  
Positive Blood Return (Site #1) Yes 11/11/2019  4:00 AM  
Hub Color/Line Status Purple; Infusing 11/11/2019  4:00 AM  
Positive Blood Return (Site #2) Yes 11/11/2019  4:00 AM  
Alcohol Cap Used Yes 11/11/2019  4:00 AM  
  
 
Opportunity for questions and clarification was provided. Initial RN admission and assessment performed and documented in Endoscopy navigator. Patient evaluated by anesthesia in pre-procedure holding. All procedural vital signs, airway assessment, and level of consciousness information monitored and recorded by anesthesia staff on the anesthesia record. Report received from CRNA post procedure. Patient transported to recovery area by RN. Endoscope was pre-cleaned at bedside immediately following procedure by Naty Magana.

## 2019-11-11 NOTE — PROGRESS NOTES
[facility], [place] 
 [address] [address], [address] [facility] Phone: [phone]   [phone]   
  
Nephrology Progress Note Sona Kiser     1950     [id number] Date of Admission : 10/25/2019 
11/11/19 CC:  Follow up for JUAN J, CKD, Hyponatremia Assessment and Plan JUAN J on CKD : 
- 2/2 CRS and resolved and better than baseline - Now severely fluid overloaded after CSY prep - I/O have been inaccurate due to CBI  
- increased Bumex to 2 mg BID  
- repeat labs in pm for electrolytes CKD III at baseline w/ L renal atrophy: - NM scan shows equal function 
- baseline Cr elevated from CRS and urinary retention issues - Cr much better than baseline  
- agree w/ holding ace/arb in preparation for VAD Gross hematuria: 
- on CBI now - clearing 
- CSY this week Serratia and Enteroccocus UTI: 
- per ID Hyponatremia: 
- from hypervolemia and excessive fluid intake 
- stable Hoarseness, vocal cord paralysis, mediastinal adenopathy: 
- will need eventual PET/CT 
- per pulmonary 
  
BPH w/ urinary retention  
- neal in place 
  
Acute on Chronic HFrEF  
- EF 16-20%, NYHA Class IV , hx of VF arrest - s/p AICD 
- Impella insertion 10/29 
- LVAD eval underwaty 
  
JOSE on CPAP  
  
PAF s/p DCCV 6/19 
  
Chronic Anemia: 
- from CKD and iron deficiency - s/p IV iron, now on PAVEL 
- hgb stable - plans for EGD and colon today Interval History:   
Seen and examined. Remains On CBI now. Cr stable. Fluid overloaded after CSY prep in the last 24 hrs. No cp, sob, n/v/d.  impella in place. Review of Systems: Pertinent items are noted in HPI. Current Medications:  
Current Facility-Administered Medications Medication Dose Route Frequency  potassium chloride 20 mEq in 50 ml IVPB  20 mEq IntraVENous Q1H  
 bumetanide (BUMEX) injection 2 mg  2 mg IntraVENous Q12H  hydrALAZINE (APRESOLINE) tablet 10 mg  10 mg Oral TID  0.9% sodium chloride infusion 250 mL  250 mL IntraVENous PRN  
 amiodarone (CORDARONE) tablet 100 mg  100 mg Oral DAILY  dronabinol (MARINOL) capsule 2.5 mg  2.5 mg Oral ACB&D  piperacillin-tazobactam (ZOSYN) 3.375 g in 0.9% sodium chloride (MBP/ADV) 100 mL  3.375 g IntraVENous Q8H  
 insulin lispro (HUMALOG) injection   SubCUTAneous AC&HS  bumetanide (BUMEX) injection 1 mg  1 mg IntraVENous Q6H PRN  
 fluticasone propionate (FLONASE) 50 mcg/actuation nasal spray 2 Spray  2 Spray Both Nostrils DAILY  sodium chloride (OCEAN) 0.65 % nasal squeeze bottle 2 Spray  2 Spray Both Nostrils QID  alteplase (CATHFLO) 1 mg in dextrose 5% 50 mL impella purge solution  1 mg Other TITRATE  epoetin yvonne-epbx (RETACRIT) injection 10,000 Units  10,000 Units SubCUTAneous Q TUE, THU & SAT  pantoprazole (PROTONIX) tablet 40 mg  40 mg Oral ACB  dextrose 5% infusion  4-20 mL/hr IntraVENous CONTINUOUS  
 [Held by provider] bivalirudin (ANGIOMAX) 250 mg in dextrose 5% 250 mL infusion  4-20 mL/hr Other TITRATE  alteplase (CATHFLO) 1 mg in sterile water (preservative free) 1 mL injection  1 mg InterCATHeter PRN  
 bacitracin 500 unit/gram packet 1 Packet  1 Packet Topical PRN  
 sodium chloride (NS) flush 5-40 mL  5-40 mL IntraVENous Q8H  
 sodium chloride (NS) flush 5-40 mL  5-40 mL IntraVENous PRN  
 0.45% sodium chloride infusion  10 mL/hr IntraVENous CONTINUOUS  
 0.9% sodium chloride infusion  10 mL/hr IntraVENous CONTINUOUS  
 oxyCODONE IR (ROXICODONE) tablet 5 mg  5 mg Oral Q4H PRN  
 oxyCODONE IR (ROXICODONE) tablet 10 mg  10 mg Oral Q4H PRN  
 morphine injection 4 mg  4 mg IntraVENous Q2H PRN  
 naloxone (NARCAN) injection 0.4 mg  0.4 mg IntraVENous PRN  
 ondansetron (ZOFRAN) injection 4 mg  4 mg IntraVENous Q4H PRN  
 albuterol (PROVENTIL VENTOLIN) nebulizer solution 2.5 mg  2.5 mg Nebulization Q4H PRN  chlorhexidine (PERIDEX) 0.12 % mouthwash 10 mL  10 mL Oral Q12H  magnesium oxide (MAG-OX) tablet 400 mg  400 mg Oral BID  calcium chloride 1 g in 0.9% sodium chloride 250 mL IVPB  1 g IntraVENous PRN  
 bisacodyl (DULCOLAX) suppository 10 mg  10 mg Rectal DAILY PRN  
 senna-docusate (PERICOLACE) 8.6-50 mg per tablet 1 Tab  1 Tab Oral BID  polyethylene glycol (MIRALAX) packet 17 g  17 g Oral DAILY  ELECTROLYTE REPLACEMENT NOTE: Nurse to review Serum Potassium and Magnesuim levels and Initiate Electrolyte Replacement Protocol as needed  1 Each Other PRN  
 magnesium sulfate 1 g/100 ml IVPB (premix or compounded)  1 g IntraVENous PRN  
 glucose chewable tablet 16 g  4 Tab Oral PRN  
 glucagon (GLUCAGEN) injection 1 mg  1 mg IntraMUSCular PRN  
 dextrose 10% infusion 0-250 mL  0-250 mL IntraVENous PRN  
 morphine injection 2 mg  2 mg IntraVENous Q2H PRN  
 melatonin tablet 3 mg  3 mg Oral QHS PRN  
 albumin human 5% (BUMINATE) solution 25 g  25 g IntraVENous Q2H PRN  
 influenza vaccine 2019-20 (6 mos+)(PF) (FLUARIX/FLULAVAL/FLUZONE QUAD) injection 0.5 mL  0.5 mL IntraMUSCular PRIOR TO DISCHARGE  
 [Held by provider] aspirin delayed-release tablet 81 mg  81 mg Oral DAILY  tamsulosin (FLOMAX) capsule 0.8 mg  0.8 mg Oral DAILY  allopurinol (ZYLOPRIM) tablet 100 mg  100 mg Oral DAILY No Known Allergies Objective: 
Vitals:   
Vitals:  
 11/11/19 0200 11/11/19 0300 11/11/19 0400 11/11/19 0500 BP: 114/88 109/89 (!) 107/91 108/89 Pulse: 70 69 77 70 Resp: 17 16 19 13 Temp:   97.8 °F (36.6 °C) SpO2: 97% 92% 90% 94% Weight:   90 kg (198 lb 6.6 oz) Height:      
 
Intake and Output: 
11/10 1901 - 11/11 0700 In: 470.4 [I.V.:470.4] Out: 1400  
11/09 0701 - 11/10 1900 In: 6093.1 [P.O.:2100; I.V.:993.1] Out: 5950 Physical Examination: 
Pt intubated     No 
General: Awake and alert Neck:  Supple, no mass Resp:  Lungs few dependent rales CV:  RRR,  no murmur or rub, trace b/l LE edema GI:  Soft, NT, + Bowel sounds Neurologic:  AOx3, nonfocal exam 
Psych:             Normal mood and affect Skin:  No Rash :  Lizama w/ blood-tinged urine [x]    High complexity decision making was performed 
[]    Patient is at high-risk of decompensation with multiple organ involvement Lab Data Personally Reviewed: I have reviewed all the pertinent labs, microbiology data and radiology studies during assessment. Recent Labs 11/11/19 
6549 11/10/19 
0423 11/09/19 
0408  134* 135* K 3.3* 4.0 4.0  
 101 101 CO2 28 29 29 GLU 76 89 79 BUN 13 16 15 CREA 0.97 1.25 1.13  
CA 8.6 8.5 8.6 MG 2.1 2.1 2.0 ALB 2.2* 2.3* 2.3*  
SGOT 28 29 35 ALT 24 30 28 INR 1.2* 1.1 1.1 Recent Labs 11/11/19 
7102 11/10/19 
0423 11/09/19 
0408 WBC 4.4 4.8 5.0 HGB 8.5* 8.6* 8.2* HCT 27.5* 28.0* 27.0*  
PLT 93* 105* 98* No results found for: SDES Lab Results Component Value Date/Time Culture result: SERRATIA MARCESCENS (A) 10/31/2019 10:05 AM  
 Culture result: SERRATIA MARCESCENS (2ND COLONY TYPE/STRAIN) (A) 10/31/2019 10:05 AM  
 Culture result: ENTEROCOCCUS FAECALIS GROUP D (A) 10/31/2019 10:05 AM  
 Culture result: NO GROWTH 5 DAYS 10/25/2019 07:14 PM  
 Culture result: ENTEROBACTER CLOACAE (A) 06/26/2019 12:25 PM  
 
Recent Results (from the past 24 hour(s)) GLUCOSE, POC Collection Time: 11/10/19  6:46 AM  
Result Value Ref Range Glucose (POC) 100 65 - 100 mg/dL Performed by Catalina Longoria GLUCOSE, POC Collection Time: 11/10/19 12:20 PM  
Result Value Ref Range Glucose (POC) 108 (H) 65 - 100 mg/dL Performed by Geovani Navarro GLUCOSE, POC Collection Time: 11/10/19  9:30 PM  
Result Value Ref Range Glucose (POC) 139 (H) 65 - 100 mg/dL Performed by Catalina Longoria LD Collection Time: 11/11/19  4:37 AM  
Result Value Ref Range  (H) 85 - 241 U/L  
NT-PRO BNP Collection Time: 11/11/19  4:37 AM  
Result Value Ref Range NT pro-BNP 6,348 (H) <125 PG/ML  
LACTIC ACID Collection Time: 11/11/19  4:37 AM  
Result Value Ref Range Lactic acid 0.8 0.4 - 2.0 MMOL/L  
PROTHROMBIN TIME + INR Collection Time: 11/11/19  4:37 AM  
Result Value Ref Range INR 1.2 (H) 0.9 - 1.1 Prothrombin time 11.9 (H) 9.0 - 11.1 sec PTT Collection Time: 11/11/19  4:37 AM  
Result Value Ref Range aPTT 27.8 22.1 - 32.0 sec  
 aPTT, therapeutic range     58.0 - 77.0 SECS  
MAGNESIUM Collection Time: 11/11/19  4:37 AM  
Result Value Ref Range Magnesium 2.1 1.6 - 2.4 mg/dL CBC W/O DIFF Collection Time: 11/11/19  4:37 AM  
Result Value Ref Range WBC 4.4 4.1 - 11.1 K/uL  
 RBC 2.86 (L) 4.10 - 5.70 M/uL HGB 8.5 (L) 12.1 - 17.0 g/dL HCT 27.5 (L) 36.6 - 50.3 % MCV 96.2 80.0 - 99.0 FL  
 MCH 29.7 26.0 - 34.0 PG  
 MCHC 30.9 30.0 - 36.5 g/dL RDW 21.0 (H) 11.5 - 14.5 % PLATELET 93 (L) 251 - 400 K/uL MPV 9.7 8.9 - 12.9 FL  
 NRBC 0.0 0  WBC ABSOLUTE NRBC 0.00 0.00 - 0.01 K/uL METABOLIC PANEL, COMPREHENSIVE Collection Time: 11/11/19  4:37 AM  
Result Value Ref Range Sodium 136 136 - 145 mmol/L Potassium 3.3 (L) 3.5 - 5.1 mmol/L Chloride 101 97 - 108 mmol/L  
 CO2 28 21 - 32 mmol/L Anion gap 7 5 - 15 mmol/L Glucose 76 65 - 100 mg/dL BUN 13 6 - 20 MG/DL Creatinine 0.97 0.70 - 1.30 MG/DL  
 BUN/Creatinine ratio 13 12 - 20 GFR est AA >60 >60 ml/min/1.73m2 GFR est non-AA >60 >60 ml/min/1.73m2 Calcium 8.6 8.5 - 10.1 MG/DL Bilirubin, total 1.4 (H) 0.2 - 1.0 MG/DL  
 ALT (SGPT) 24 12 - 78 U/L  
 AST (SGOT) 28 15 - 37 U/L Alk. phosphatase 122 (H) 45 - 117 U/L Protein, total 5.4 (L) 6.4 - 8.2 g/dL Albumin 2.2 (L) 3.5 - 5.0 g/dL Globulin 3.2 2.0 - 4.0 g/dL A-G Ratio 0.7 (L) 1.1 - 2.2 EKG, 12 LEAD, INITIAL Collection Time: 11/11/19  4:49 AM  
Result Value Ref Range Ventricular Rate 74 BPM  
 Atrial Rate 39 BPM  
 QRS Duration 158 ms Q-T Interval 504 ms QTC Calculation (Bezet) 559 ms Calculated R Axis 13 degrees Calculated T Axis 175 degrees Diagnosis Electronic ventricular pacemaker When compared with ECG of 05-NOV-2019 10:44, 
Vent. rate has decreased BY   9 BPM 
  
TYPE & SCREEN Collection Time: 11/11/19  5:00 AM  
Result Value Ref Range Crossmatch Expiration 11/14/2019 ABO/Rh(D) O POSITIVE Antibody screen POS Comment Previously identifiedn Nonspecific Cold Antibody and Nonspecific Antibody Antibody ID PENDING Total time spent with patient:  xxx   min. Care Plan discussed with: 
Patient Family RN Consulting Physician [id number] [facility],      
 
I have reviewed the flowsheets. Chart and Pertinent Notes have been reviewed. No change in PMH ,family and social history from Consult note.  
 
 
[phone] Rashaun Mera MD

## 2019-11-11 NOTE — INTERDISCIPLINARY ROUNDS
IDR/SLIDR Summary Patient: Donny Shaikh MRN: [id number]    Age: 71 y.o. YOB: 1950 Room/Bed: [address] [place] [address] Admit Diagnosis: Acute decompensated heart failure (HCC) [I50.9]  Principal Diagnosis: <principal problem not specified>  
Goals: LVAD w/u Readmission: NO  Quality Measure: CHF VTE Prophylaxis: Mechanical 
Influenza Vaccine screening completed? YES Pneumococcal Vaccine screening completed? NO Mobility needs: Yes   Nutrition plan:Yes 
Consults:P.T, O.T. and Case Management Financial concerns:Yes  Escalated to CM? YES 
RRAT Score:    Interventions:H2H Testing due for pt today? YES 
LOS: 17 days Expected length of stay ? days Discharge plan: tbd   PCP: Romeo Stahl MD 
Transportation needs: Yes Days before discharge:two or more days before discharge Discharge disposition: tbd Signed:  
 
Deon Regalado RN 
11/11/2019 
2:08 AM

## 2019-11-11 NOTE — PROCEDURES
[address] [facility], [address] [place] [address] Esophago- Gastroduodenoscopy (EGD) Procedure Note [address]es 1950 
[id number] Procedure: Endoscopic Gastroduodenoscopy --diagnostic Indication:  Iron deficiency anemia Pre-operative Diagnosis: see indication above Post-operative Diagnosis: see findings below : Zaria Sunshine MD 
 
Surgical Assistant: None Implants:  None Referring Provider:  Gordy Nichole MD 
 
 
Anesthesia/Sedation:  MAC anesthesia Propofol Procedure Details After infomed consent was obtained for the procedure, with all risks and benefits of procedure explained the patient was taken to the endoscopy suite and placed in the left lateral decubitus position. Following sequential administration of sedation as per above, the endoscope was inserted into the mouth and advanced under direct vision to third portion of the duodenum. A careful inspection was made as the gastroscope was withdrawn, including a retroflexed view of the proximal stomach; findings and interventions are described below. Findings:  
Esophagus:normal 
Stomach: normal  
Duodenum/jejunum: normal 
 
 
Therapies:  none Specimens: none EBL: None Complications:   None; patient tolerated the procedure well. Impression:   
-See post-procedure diagnoses. Recommendations: 
-colonoscopy today Signed By: Zaria Sunshine MD   
 11/11/2019  1:28 PM

## 2019-11-11 NOTE — ANESTHESIA PREPROCEDURE EVALUATION
Relevant Problems No relevant active problems Anesthetic History No history of anesthetic complications Review of Systems / Medical History Patient summary reviewed, nursing notes reviewed and pertinent labs reviewed Pulmonary Sleep apnea: CPAP Neuro/Psych Within defined limits Cardiovascular Hypertension CHF: NYHA Classification IV, orthopnea, PND and dyspnea on exertion Dysrhythmias : atrial fibrillation CAD and CABG Exercise tolerance: <4 METS Comments: EF 10%, on inotropes S/p Sternectomy for sternal wound infection GI/Hepatic/Renal 
  
 
 
Renal disease: CRI Endo/Other Arthritis Other Findings Physical Exam 
 
Airway Mallampati: II 
TM Distance: > 6 cm Neck ROM: normal range of motion Mouth opening: Normal 
 
 Cardiovascular Rhythm: irregular Rate: normal 
 
Murmur (impella) Dental 
 
Dentition: Full lower dentures and Full upper dentures Pulmonary Breath sounds clear to auscultation Abdominal 
GI exam deferred Other Findings Anesthetic Plan ASA: 4 Anesthesia type: MAC Monitoring Plan: Arterial line Induction: Intravenous Anesthetic plan and risks discussed with: Patient

## 2019-11-11 NOTE — PROGRESS NOTES
ID Progress Note 2019 Subjective: No specific complaints today--seems to be doing ok Objective: Antibiotics: 1. Cefepime Vitals:  
Visit Vitals BP 99/83 (BP 1 Location: Right arm, BP Patient Position: At rest) Pulse 97 Temp 98 °F (36.7 °C) Resp 23 Ht 6' 2\" (1.88 m) Wt 89.8 kg (198 lb) SpO2 (!) 86% BMI 25.42 kg/m² Tmax:  Temp (24hrs), Av.9 °F (36.6 °C), Min:97.7 °F (36.5 °C), Max:98 °F (36.7 °C) Exam:  Lungs:  clear to auscultation bilaterally Heart:  regular rate and rhythm Abdomen:  soft, non-tender. Bowel sounds normal. No masses,  no organomegaly Grossly bloody urine in catheter Labs:     
Recent Labs 19 
5306 11/10/19 
0423 19 
0408 WBC 4.4 4.8 5.0 HGB 8.5* 8.6* 8.2*  
PLT 93* 105* 98* BUN 13 16 15 CREA 0.97 1.25 1.13  
SGOT 28 29 35 * 133* 130* TBILI 1.4* 1.2* 1.3* Cultures: No results found for: [facility] Lab Results Component Value Date/Time Culture result: SERRATIA MARCESCENS (A) 10/31/2019 10:05 AM  
 Culture result: SERRATIA MARCESCENS (2ND COLONY TYPE/STRAIN) (A) 10/31/2019 10:05 AM  
 Culture result: ENTEROCOCCUS FAECALIS GROUP D (A) 10/31/2019 10:05 AM  
 
 
Radiology:  
 
Line/Insert Date:        
 
Assessment:  
 
1. UTI--Serratia (2 biotypes) from culture and small amount of Enterococcus 2. CHF/cardiomyopathy Objective: 1. Monitor off antibiotic therapy Camacho Lazcano MD

## 2019-11-12 NOTE — PROGRESS NOTES
[facility] ICU Progress Note Admit Date: 10/25/2019 POD: 11 Day Post-Op Procedure:  Procedure(s): RIGHT AXILLARY IMPELLA INSERTION Subjective:  
Pt seen with Dr. Emmanuel Blackwood. On RA. Afebrile. Impella P8, D5 purge. Lizama in place Objective:  
Vitals: 
Blood pressure 95/73, pulse 90, temperature 98 °F (36.7 °C), resp. rate 19, height 6' 2\" (1.88 m), weight 192 lb 14.4 oz (87.5 kg), SpO2 97 %. Temp (24hrs), Av.1 °F (36.7 °C), Min:98 °F (36.7 °C), Max:98.3 °F (36.8 °C) Hemodynamics: 
 CO: CO (l/min): 8.3 l/min CI: CI (l/min/m2): 4 l/min/m2 CVP: CVP (mmHg): 10 mmHg (19) SVR: SVR (dyne*sec)/cm5: 781 (dyne*sec)/cm5 (19) PAP Systolic: PAP Systolic: 45 (15/33/00 8441) PAP Diastolic: PAP Diastolic: 19 (65/10/52 8442) PVR:   
 SV02: SVO2 (%): 51 % (19 1500) SCV02: SCVO2 (%): 75 % (10/29/19 1900) EKG/Rhythm:   
Paced in the 90s Oxygen Therapy: 
Oxygen Therapy O2 Sat (%): 97 % (19) Pulse via Oximetry: 83 beats per minute (19) O2 Device: Room air (19 08) O2 Flow Rate (L/min): 2 l/min (19) FIO2 (%): 40 % (10/30/19 0846) CXR:  
CXR Results  (Last 48 hours)  
          
 19 05  XR CHEST PORT Final result Impression:  IMPRESSION:  
Stable examination. Narrative:  PORTABLE CHEST RADIOGRAPH/S: 2019 5:12 AM  
   
Clinical history: Postoperative heart INDICATION:   postop heart COMPARISON: 2019 FINDINGS:  
AP portable upright view of the chest demonstrates a stable  cardiopericardial  
silhouette. The lungs are adequately expanded. There is no edema, effusion,  
consolidation, or pneumothorax. Cardiac assist device is unchanged. Cardiac  
pacer unchanged. PICC line catheter unchanged. Mild interstitial edema and  
cardiomegaly. . Patient is on a cardiac monitor. 19 0505  XR CHEST PORT Final result Impression:  IMPRESSION:  
No interval change. Narrative:  PORTABLE CHEST RADIOGRAPH/S: 2019 5:05 AM  
   
Clinical history: Postoperative heart INDICATION:   postop heart COMPARISON: 11/10/2019 FINDINGS:  
AP portable upright view of the chest demonstrates a stable  cardiopericardial  
silhouette. The lungs are adequately expanded. There is no edema, effusion,  
consolidation, or pneumothorax. Cardiac assist device is stable in appearance. Cardiac pacer unchanged. Right-sided PICC line catheter unchanged. Stable  
minimal interstitial opacity. . Patient is on a cardiac monitor. Admission Weight: Last Weight Weight: 192 lb 10.9 oz (87.4 kg) Weight: 192 lb 14.4 oz (87.5 kg) Intake / Output / Drain: 
Current Shift:  0701 -  1900 In: 65 [P.O.:236; I.V.:76] Out: - Last 24 hrs.:  
 
Intake/Output Summary (Last 24 hours) at 2019 1015 Last data filed at 2019 1488 Gross per 24 hour Intake 1059.37 ml Output 2765 ml Net -1705.63 ml EXAM: 
General:  NAD. Up in the chair Lungs:   Diminished in the bases. Incision:  Impella dressing C/D/I Heart:  Regular rate and rhythm, S1, S2 normal, no murmur, click, rub or gallop. Abdomen:   Soft, non-tender. Bowel sounds hypoactive. No masses,  No organomegaly. Extremities:  +2 bilateral lower extremity pitting edema. PPP. Neurologic:  Gross motor and sensory apparatus intact. Labs:  
Recent Labs 19 
0708 19 
0409 WBC  --  3.8* HGB  --  8.7* HCT  --  28.1*  
PLT  --  111* NA  --  137 K  --  3.5 BUN  --  13  
CREA  --  1.25  
GLU  --  86  
GLUCPOC 91  --   
INR  --  1.2* Assessment:  
 
Active Problems: 
  Acute decompensated heart failure ([facility] [address] (10/25/2019) Plan/Recommendations/Medical Decision Makin. Acute on chronic systolic (CHF Class IV) S/P Impella: Has ICD.  LV EF 16-20%. No BB/ACE/ARB/AA until appropriate. Holding diuresis. Trend proBNP, lactate, LDH. Strict I/Os. Daily weights. LVAD w/u in process - looking towards 11/18/19. On Ancef for ppx. Continue D5 purge due to hematuria/epistaxis 2. Thrombocytopenia: Platelets improving at 111K. No asa. HIT negative, LOAN negative. On protonix. Heme following. 3. CAD S/p CABG 2010: Needs [facility] - looking to tomorrow w/ Dr. Nora Potter. Stopped asa d/t hematuria/thrombocytopenia, not on statin historically. No BB d/t lower BP, on hydralazine but may need to hold 4. PAF s/p DCCV 6/2019: Holding eliquis due to hematuria/epistaxis. On PO amio. 5. JUAN J on CKD stage IV: Monitor. Renal following. Diuretics PRN (currently holding) CVP >10. Keep neal. 6. Hx of urinary retention/BPH, hematuria: On flomax. Neal in place, CBI stopped since urine clear. Planning for cystoscopy in next 1-2 days 7. JOSE: qhs CPAP. 8. Hx of sternal wound infection requring sternectomy: Not a contraindication for future LVAD. Tagged WBC -- showed no abnormal tracer uptake. 9. Iron def anemia: s/p venofer. H&H stable. On Epogen. Cannot use doxy/octreotide d/t prolonged QTC. Prev +occult stool. EGD/colonoscopy 11/11/19 neg for any acute process/abnormality, only revealed diverticulitis 10. Vocal cord paralysis: Voice improving. Speech eval PRN. Advance diet as tolerated. 11. Constipation: Resolved. Continue pericolace, miralax (pt keeps refusing). Continue daily suppository PRN. 12. Serratia UTI/hematuria: completed Zosyn. Monitor 13. Mediastinal lymphadenopathy:  Per AHF, low threshold for Carcinoma per CT scans. Eventually needs PET scan. 14. Hyponatremia: Resolved-monitor 15. Acute Moderate protein-calorie malnutrition:  Pre-albumin 10/25 was 13.9. Recheck 11/4 was 16.2. Pt eating cardiac diet well. Continue marinol Dispo: Care and orders per AHFS. Remain in CCU. Plan for LVAD 11-18-19. Signed By: Sofía Briones NP

## 2019-11-12 NOTE — PROGRESS NOTES
1930 received bedside report from [address] pt in chair on assessment, Impella @ 42.5 running without complication 2100 CHG bath completed, pt back to bed 
 
2200 Pt on home CPAP machine 
 
0000 no change on re-assessment 
 
0400 labs sent per order 0630 pt up in chair, feeling nauseous, Zofran given 8915 Dr Arina Stone at bedside 
 
0700 electrolyte replacement initiated per protocol 0730 Bedside shift change report given to 7 Karla Good (oncoming nurse) by Eber Turner RN (offgoing nurse). Report included the following information SBAR, Kardex, Procedure Summary, Intake/Output, MAR, Recent Results and Cardiac Rhythm Paced.

## 2019-11-12 NOTE — INTERDISCIPLINARY ROUNDS
IDR/SLIDR Summary Patient: Matt Bailey MRN: [id number]    Age: 71 y.o. YOB: 1950 Room/Bed: [address] [place] [address] Admit Diagnosis: Acute decompensated heart failure (HCC) [I50.9]  Principal Diagnosis: <principal problem not specified>  
Goals: LVAD w/u Readmission: NO  Quality Measure: CHF VTE Prophylaxis: Mechanical 
Influenza Vaccine screening completed? YES Pneumococcal Vaccine screening completed? NO Mobility needs: Yes   Nutrition plan:Yes 
Consults:P.T, O.T. and Case Management Financial concerns:Yes  Escalated to CM? YES 
RRAT Score:    Interventions:H2H Testing due for pt today? YES 
LOS: 18 days Expected length of stay ? days Discharge plan: tbd   PCP: Hal Barton MD 
Transportation needs: Yes Days before discharge:two or more days before discharge Discharge disposition: tbd Signed:  
 
Omega Bob 11/12/2019 
2:08 AM

## 2019-11-12 NOTE — PROCEDURES
[address]  PULMONARY FUNCTION TEST Name:  Shraddha Harris 
MR#:  [id number] :  1950 ACCOUNT #:  [de-identified] DATE OF SERVICE:  2019 CLINICAL INDICATION:  Preoperative evaluation Spirometry is performed. Spirometry reveals a mild reduction in vital capacity, which appears secondary to a restrictive ventilatory defect. Lung volumes are recommended if clinically indicated. Flow volume loop is consistent with a restrictive pattern. MD KATHLEEN Mi/[id number]/[id number] 
D:  2019 13:13 
T:  2019 20:48 JOB #:  [id number]

## 2019-11-12 NOTE — PROGRESS NOTES
[facility] 
[address], [address] GI PROGRESS NOTE Will Meryl Kuo, [address] [facility] office [phone] NP/PA in-hospital cell phone M-F until 4:30PM 
After 5PM or on weekends, please call  for physician on call NAME: Fahad Gibbs :  1950 MRN:  [id number] Subjective:  
Patient is sitting in the chair. EGD and colonoscopy were completed yesterday. No nausea, vomiting, or abdominal pain. He reports a small bowel movement last evening. No signs of GI bleeding. Objective: VITALS:  
Last 24hrs VS reviewed since prior progress note. Most recent are: 
Visit Vitals BP 95/73 (BP 1 Location: Left arm, BP Patient Position: At rest) Pulse 81 Temp 98.2 °F (36.8 °C) Resp 23 Ht 6' 2\" (1.88 m) Wt 87.5 kg (192 lb 14.4 oz) SpO2 (!) 85% BMI 24.77 kg/m² PHYSICAL EXAM: 
General:          Cooperative, no acute distress Neurologic:      Alert and oriented HEENT:           EOMI, no scleral icterus Lungs:             Diminished bilaterally anteriorly Heart:              S1 S2 Abdomen:        Soft, non-distended, no tenderness, no guarding, no rebound. +Bowel sounds. Extremities:     Warm Psych:             Not anxious or agitated Lab Data Reviewed:  
 
Recent Results (from the past 24 hour(s)) GLUCOSE, POC Collection Time: 19  1:39 PM  
Result Value Ref Range Glucose (POC) 86 65 - 100 mg/dL Performed by AHLERT JOSE FRANCISCO   
GLUCOSE, POC Collection Time: 19  4:35 PM  
Result Value Ref Range Glucose (POC) 113 (H) 65 - 100 mg/dL Performed by AHLERT JOSE FRANCISCO   
GLUCOSE, POC Collection Time: 19 10:00 PM  
Result Value Ref Range Glucose (POC) 115 (H) 65 - 100 mg/dL Performed by Jacque BRAVO Collection Time: 19  4:09 AM  
Result Value Ref Range  (H) 85 - 241 U/L  
NT-PRO BNP Collection Time: 19  4:09 AM  
Result Value Ref Range NT pro-BNP 7,552 (H) <125 PG/ML  
LACTIC ACID Collection Time: 11/12/19  4:09 AM  
Result Value Ref Range Lactic acid 0.9 0.4 - 2.0 MMOL/L  
PROTHROMBIN TIME + INR Collection Time: 11/12/19  4:09 AM  
Result Value Ref Range INR 1.2 (H) 0.9 - 1.1 Prothrombin time 11.8 (H) 9.0 - 11.1 sec PTT Collection Time: 11/12/19  4:09 AM  
Result Value Ref Range aPTT 27.9 22.1 - 32.0 sec  
 aPTT, therapeutic range     58.0 - 77.0 SECS  
CRP, HIGH SENSITIVITY Collection Time: 11/12/19  4:09 AM  
Result Value Ref Range CRP, High sensitivity >9.5 mg/L MAGNESIUM Collection Time: 11/12/19  4:09 AM  
Result Value Ref Range Magnesium 2.0 1.6 - 2.4 mg/dL CBC W/O DIFF Collection Time: 11/12/19  4:09 AM  
Result Value Ref Range WBC 3.8 (L) 4.1 - 11.1 K/uL  
 RBC 2.95 (L) 4.10 - 5.70 M/uL HGB 8.7 (L) 12.1 - 17.0 g/dL HCT 28.1 (L) 36.6 - 50.3 % MCV 95.3 80.0 - 99.0 FL  
 MCH 29.5 26.0 - 34.0 PG  
 MCHC 31.0 30.0 - 36.5 g/dL RDW 21.3 (H) 11.5 - 14.5 % PLATELET 402 (L) 217 - 400 K/uL MPV 10.5 8.9 - 12.9 FL  
 NRBC 0.0 0  WBC ABSOLUTE NRBC 0.00 0.00 - 0.01 K/uL METABOLIC PANEL, COMPREHENSIVE Collection Time: 11/12/19  4:09 AM  
Result Value Ref Range Sodium 137 136 - 145 mmol/L Potassium 3.5 3.5 - 5.1 mmol/L Chloride 101 97 - 108 mmol/L  
 CO2 29 21 - 32 mmol/L Anion gap 7 5 - 15 mmol/L Glucose 86 65 - 100 mg/dL BUN 13 6 - 20 MG/DL Creatinine 1.25 0.70 - 1.30 MG/DL  
 BUN/Creatinine ratio 10 (L) 12 - 20 GFR est AA >60 >60 ml/min/1.73m2 GFR est non-AA 57 (L) >60 ml/min/1.73m2 Calcium 8.9 8.5 - 10.1 MG/DL Bilirubin, total 1.2 (H) 0.2 - 1.0 MG/DL  
 ALT (SGPT) 22 12 - 78 U/L  
 AST (SGOT) 27 15 - 37 U/L Alk. phosphatase 124 (H) 45 - 117 U/L Protein, total 5.8 (L) 6.4 - 8.2 g/dL Albumin 2.4 (L) 3.5 - 5.0 g/dL Globulin 3.4 2.0 - 4.0 g/dL A-G Ratio 0.7 (L) 1.1 - 2.2 GLUCOSE, POC  Collection Time: 11/12/19  7:08 AM  
 Result Value Ref Range Glucose (POC) 91 65 - 100 mg/dL Performed by Kailyn Potter Assessment:  
· LVAD evaluation: Impella in place. · Iron deficiency anemia: EGD (11/11/19): normal. Colonoscopy (11/11/19): moderate sigmoid diverticulosis; mild descending colon diverticulosis. Repeat colonoscopy in 5 years. No signs of GI bleeding. Hgb 8.7 (8.5 yesterday). · History of colon polyps: Colonoscopy in 2009 with reported tubular adenomas. · Acute on chronic systolic heart failure · History of VF arrest status post AICD · Coronary artery disease status post CABG in 2010 · Atrial fibrillation · Acute kidney injury on chronic kidney disease · Urinary tract infection/hematuria: ID following. Patient Active Problem List  
Diagnosis Code  Paroxysmal atrial fibrillation (HCC) I48.0  Acute on chronic systolic CHF (congestive heart failure) (HCC) I50.23  Systolic CHF, chronic (HCC) I50.22  
 Peripheral vascular disease (HCC) I73.9  Acute decompensated heart failure (HCC) I50.9 Plan: · Monitor CBC and transfuse as necessary · No signs of GI bleeding. EGD and colonoscopy findings were discussed with the patient. · We will sign off and be available again as needed. Please call us with any questions. Thank you. Signed By: MALCLOM German   
 11/12/2019  9:20 AM 
  
 
I have examined the patient. I have reviewed the chart and agree with the documentation recorded by the NP, including the assessment, treatment plan, and disposition.  
 
 
 
May Smith MD

## 2019-11-12 NOTE — PROGRESS NOTES
Urology Progress Note Patient: Christoph Butterfield MRN: [id number]  SSN: [id number] YOB: 1950  Age: 71 y.o. Sex: male ADMITTED: 10/25/2019 to Aislinn Frye MD for Acute decompensated heart failure ([facility] [place] [I50.9] POD# 1 Day Post-Op Procedure(s): ESOPHAGOGASTRODUODENOSCOPY (EGD) COLONOSCOPY at bedside Urine remains clear with CBI off. No complaints. Vitals: Temp (24hrs), Av.1 °F (36.7 °C), Min:98 °F (36.7 °C), Max:98.3 °F (36.8 °C) Blood pressure 95/73, pulse 70, temperature 98 °F (36.7 °C), resp. rate 18, height 6' 2\" (1.88 m), weight 87.5 kg (192 lb 14.4 oz), SpO2 95 %. Intake and Output: 
11/10 1901 -  0700 In: 1400.5 [I.V.:1400.5] Out: 0324 [facility] Labs: 
Labs:  
Lab Results Component Value Date/Time WBC 3.8 (L) 2019 04:09 AM  
 HGB 8.7 (L) 2019 04:09 AM  
 Creatinine 1.25 2019 04:09 AM  
 
 
 
Assessment/Plan: 
 Plan for cystoscopy in OR tomorrow - scheduled for 7:30. Signed By: Makenna Lujan MD - 2019

## 2019-11-12 NOTE — PROGRESS NOTES
NYHA class IV A/C systolic heart failure Low EF (10%) Inotrope dependent Malnutrition  
LVAD Work-up Epistaxis Hematuria 
  
Pre-Op Plan (11/18/19 implant) : 
1) Platelets a little low - will need to be above 150 before surgery 2) PA catheter Thursday evening 3) Hold bivalirudin at [address] to D5) 4) Start Vanc when we get in the room 5) Make sure bend relief lock is in place 6) Start drips after we finish the inflow cannula 7) Needs colonoscopy 8) Needs [facility]  
  
  
Op Plan: 
1) Will not need sternal saw - can go right to incision and dissection (need ANDREA bar's) 2) Dissection:  
            T. The RV rises above level of posterior sternal plate inferiorly (start high) 
            B. The innominate vein is unusually low (can feel for the wires) 
            C. Will likely need to incorporate proximal CABG anastomosis in side-biter                  for outflow graft             R. LIMA to LAD graft is fairly lateral and should be out of the way             E. Will need to get aorta and LV apex out as usual 
            F. Place drive-line (somewhat thin adipose tissue) 3) Cannulation (give heparin): 
            E. Left groin 25 Fr venous (will save the right in case we need RVAD;  
                SHARI place triple lumen early) 
            B. Build 10 mm graft side-arm off Impella graft (glue; will need to extend                  axiallary graft back); have 3/8 connector already attached  
4) Implant: 
            E. Remove Impella, de-clot graft (#4 Shakila), back-flush, hook up to bypass  
                TZEFQUI, go on bypass (place extra sucker in axillary wound             T. Packs, find apex, and core (remove trabeculations; suture in CO2 and                  pump sucker lines) 
            C. Place (4) 2-0 Ethibond sutures, bring them through inflow cannula sewing  
                 ring, and tie down 
            D. Place (2) 4-0 SH running sutures, bioglue             E. Attach pump, remove packs, and lower into well 
            F. Clamp outflow graft, turn on drips, and turn on pump at 3000 
            G. Measure outflow graft (add 1 cm), side-biter, aortotomy, and outflow graft                  anastomosis             H. Place de-airing stitch and remove clamps  
            I. Come off bypass and up on LVAD as usual 
            J. Staple off axillary graft  
  
Chest / Abdominal CT scan:  
  
Sternum is  although it looks like he still has sternal bone; the right half is higher than the left half and will need to some down; should be able to close with 12 standard wires although the sternum is thin; back-up plan would be a weave  
  
RV is coming above the sternal edge somewhat at the inferior margin so need to be careful here 
  
Proximal comes off anterior portion of the aorta; may need to place side-biter such that it incorporates the proximal; needs a LHC to figure out which way the graft is going; no significant calcium in the ascending aorta; Impella in place  
  
LIMA to LAD graft is fairly lateral so should be out of the way for dissection  
  
Right atrium looks a little big 
  
Innominate vein is a little low so need to be carefull with dissection (should be able to feel the pace leads)  
  Bilateral common iliac arteries are severely calcified and narrowed down into the 4 mm range; will be somewhat hazardous trying to place femoral arterial cannula; will be best to extend out impella graft, build 10 mm side-arm, and go on through the axillary artery; will need to flush out the graft after we pull the Impella and before going on bypass (timing will be important here); will need to place the femoral venous line first 
  
Abdominal wall adipose tissue is thin so need to be careful with drive-line  
  
LHC - pending 
  
TTE - severely dilated LV cavity (7.36 cm) with reduced EF (10%); mild MR, large left atrium; previous TTE's did not reveal any AI; has moderate RV dysfunction (RVIDD 5.4, RV/LV ratio 0.73, TAPSE 1.4, mild TR; good free wall motion); little worried that sternal closure with compress his RV so will be prepare for temp RVAD support  
  
Carotids - normal 
  
ABIs - normal  
  
PFT's - FEV-1 2.25 (59% predicted)  
  
Epistaxis - resolved  
  
Hematuria - resolved  
  
Hgb 8.2, platelets 98, INR 1.1 
  
BUN 15, creatinine 1.13 
  
Bilirubin 1.3, ALT 28, AST 35, alk phos 130 
  
LDH - 438, lactic acid 0.9 
  
NT pro-BNP - 7103  
  
CEA - 6.6 (mildy elevated)  
  
CXR - mild pulmonary edema 
  
Impella - flow 3.5 L/min @ P-6 Looks good Still needs LHC Impella flow looks good Hgb and platelets look good Creatinine normal 
 
Bilirubin and other LFTs look good LDH and lactic acid look reasonable NT pro-BNP about the same CXR - mild pulmonary edema Intake/Output Summary (Last 24 hours) at 11/12/2019 1757 Last data filed at 11/12/2019 2829 Gross per 24 hour Intake 699.67 ml Output 1940 ml Net -1240.33 ml Visit Vitals /79 (BP 1 Location: Left arm, BP Patient Position: At rest) Pulse 77 Temp 98 °F (36.7 °C) Resp 19 Ht 6' 2\" (1.88 m) Wt 192 lb 14.4 oz (87.5 kg) SpO2 95% BMI 24.77 kg/m² Risk of morbidity and mortality - high Medical decision making - high complexity Total critical care time - 30 minutes (CPT 32811)

## 2019-11-12 NOTE — PROGRESS NOTES
ID Progress Note 2019 Subjective: No specific complaints today--seems to be doing ok Objective: Antibiotics: 1. Cefepime Vitals:  
Visit Vitals BP (!) 88/72 Pulse 69 Temp 98 °F (36.7 °C) Resp 21 Ht 6' 2\" (1.88 m) Wt 87.5 kg (192 lb 14.4 oz) SpO2 93% BMI 24.77 kg/m² Tmax:  Temp (24hrs), Av.2 °F (36.8 °C), Min:98 °F (36.7 °C), Max:98.3 °F (36.8 °C) Exam:  Lungs:  clear to auscultation bilaterally Heart:  regular rate and rhythm Abdomen:  soft, non-tender. Bowel sounds normal. No masses,  no organomegaly Grossly bloody urine in catheter Labs:     
Recent Labs 19 
0409 19 
0437 11/10/19 
0423 WBC 3.8* 4.4 4.8 HGB 8.7* 8.5* 8.6*  
* 93* 105* BUN 13 13 16 CREA 1.25 0.97 1.25  
SGOT 27 28 29 * 122* 133* TBILI 1.2* 1.4* 1.2* Cultures: No results found for: SDES Lab Results Component Value Date/Time Culture result: SERRATIA MARCESCENS (A) 10/31/2019 10:05 AM  
 Culture result: SERRATIA MARCESCENS (2ND COLONY TYPE/STRAIN) (A) 10/31/2019 10:05 AM  
 Culture result: ENTEROCOCCUS FAECALIS GROUP D (A) 10/31/2019 10:05 AM  
 
 
Radiology:  
 
Line/Insert Date:        
 
Assessment:  
 
1. UTI--Serratia (2 biotypes) from culture and small amount of Enterococcus 2. CHF/cardiomyopathy Objective: 1. Continue current therapy Abner Mahan MD

## 2019-11-12 NOTE — PROGRESS NOTES
[facility] 
 [address] [facility], [address] [facility] Phone: [phone]   [phone]   
  
Nephrology Progress Note Sona Kiser     1950     [id number] Date of Admission : 10/25/2019 
11/12/19 CC:  Follow up for JUAN J, CKD, Hyponatremia Assessment and Plan JUAN J on CKD : 
- 2/2 CRS and resolved and better than baseline - Volume overload improving w/ IV Bumex - No SILAS protocol w/ planned Cardiac cath tomorrow  
- strict I.O now that he is off CBI  
 
CKD III at baseline w/ L renal atrophy: - NM scan shows equal function 
- baseline Cr elevated from CRS and urinary retention issues - Cr much better than baseline  
- agree w/ holding ace/arb in preparation for VAD Gross hematuria: 
- off CBI now - CySY this week Serratia and Enteroccocus UTI: 
- per ID Hyponatremia: 
- from hypervolemia and excessive fluid intake 
- stable Hoarseness, vocal cord paralysis, mediastinal adenopathy: 
- will need eventual PET/CT 
- per pulmonary 
  
BPH w/ urinary retention  
- neal in place 
  
Acute on Chronic HFrEF  
- EF 16-20%, NYHA Class IV , hx of VF arrest - s/p AICD 
- Impella insertion 10/29 
- LVAD eval underwaty 
  
JOSE on CPAP  
  
PAF s/p DCCV 6/19 
  
Chronic Anemia: 
- from CKD and iron deficiency - s/p IV iron, now on PAVEL 
- hgb stable 
- EGD normal. CSY - Diverticulosis Interval History:   
Seen and examined. Off CBI, good UOP w/ Bumex, edema and weight coming down. Reports dry mouth. EGD./ CSY REVIEWED No cp, sob, n/v/d.  impella in place. Review of Systems: Pertinent items are noted in HPI. Current Medications:  
Current Facility-Administered Medications Medication Dose Route Frequency  epoetin yvonne-epbx (RETACRIT) injection 20,000 Units  20,000 Units SubCUTAneous Q TUE, THU & SAT  potassium chloride 20 mEq in 50 ml IVPB  20 mEq IntraVENous Q1H  
 sodium chloride (NS) flush 5-40 mL  5-40 mL IntraVENous Q8H  
  sodium chloride (NS) flush 5-40 mL  5-40 mL IntraVENous PRN  
 simethicone (MYLICON) 40ZQ/2.3LY oral drops 80 mg  1.2 mL Oral Multiple  atropine injection 0.5 mg  0.5 mg IntraVENous ONCE PRN  
 EPINEPHrine (ADRENALIN) 0.1 mg/mL syringe 1 mg  1 mg Endoscopically ONCE PRN  
 bumetanide (BUMEX) injection 2 mg  2 mg IntraVENous Q12H  ceFAZolin (ANCEF) 2 g/20 mL in sterile water IV syringe  2 g IntraVENous Q8H  
 benzocaine-menthol (CEPACOL) lozenge 1 Lozenge  1 Lozenge Mucous Membrane PRN  
 hydrALAZINE (APRESOLINE) tablet 10 mg  10 mg Oral TID  
 0.9% sodium chloride infusion 250 mL  250 mL IntraVENous PRN  
 amiodarone (CORDARONE) tablet 100 mg  100 mg Oral DAILY  dronabinol (MARINOL) capsule 2.5 mg  2.5 mg Oral ACB&D  
 insulin lispro (HUMALOG) injection   SubCUTAneous AC&HS  bumetanide (BUMEX) injection 1 mg  1 mg IntraVENous Q6H PRN  
 fluticasone propionate (FLONASE) 50 mcg/actuation nasal spray 2 Spray  2 Spray Both Nostrils DAILY  sodium chloride (OCEAN) 0.65 % nasal squeeze bottle 2 Spray  2 Spray Both Nostrils QID  alteplase (CATHFLO) 1 mg in dextrose 5% 50 mL impella purge solution  1 mg Other TITRATE  pantoprazole (PROTONIX) tablet 40 mg  40 mg Oral ACB  dextrose 5% infusion  4-20 mL/hr IntraVENous CONTINUOUS  
 [Held by provider] bivalirudin (ANGIOMAX) 250 mg in dextrose 5% 250 mL infusion  4-20 mL/hr Other TITRATE  alteplase (CATHFLO) 1 mg in sterile water (preservative free) 1 mL injection  1 mg InterCATHeter PRN  
 bacitracin 500 unit/gram packet 1 Packet  1 Packet Topical PRN  
 sodium chloride (NS) flush 5-40 mL  5-40 mL IntraVENous Q8H  
 sodium chloride (NS) flush 5-40 mL  5-40 mL IntraVENous PRN  
 0.45% sodium chloride infusion  10 mL/hr IntraVENous CONTINUOUS  
 0.9% sodium chloride infusion  10 mL/hr IntraVENous CONTINUOUS  
 oxyCODONE IR (ROXICODONE) tablet 5 mg  5 mg Oral Q4H PRN  
 oxyCODONE IR (ROXICODONE) tablet 10 mg  10 mg Oral Q4H PRN  
  morphine injection 4 mg  4 mg IntraVENous Q2H PRN  
 naloxone (NARCAN) injection 0.4 mg  0.4 mg IntraVENous PRN  
 ondansetron (ZOFRAN) injection 4 mg  4 mg IntraVENous Q4H PRN  
 albuterol (PROVENTIL VENTOLIN) nebulizer solution 2.5 mg  2.5 mg Nebulization Q4H PRN  chlorhexidine (PERIDEX) 0.12 % mouthwash 10 mL  10 mL Oral Q12H  
 magnesium oxide (MAG-OX) tablet 400 mg  400 mg Oral BID  calcium chloride 1 g in 0.9% sodium chloride 250 mL IVPB  1 g IntraVENous PRN  
 bisacodyl (DULCOLAX) suppository 10 mg  10 mg Rectal DAILY PRN  
 senna-docusate (PERICOLACE) 8.6-50 mg per tablet 1 Tab  1 Tab Oral BID  polyethylene glycol (MIRALAX) packet 17 g  17 g Oral DAILY  ELECTROLYTE REPLACEMENT NOTE: Nurse to review Serum Potassium and Magnesuim levels and Initiate Electrolyte Replacement Protocol as needed  1 Each Other PRN  
 magnesium sulfate 1 g/100 ml IVPB (premix or compounded)  1 g IntraVENous PRN  
 glucose chewable tablet 16 g  4 Tab Oral PRN  
 glucagon (GLUCAGEN) injection 1 mg  1 mg IntraMUSCular PRN  
 dextrose 10% infusion 0-250 mL  0-250 mL IntraVENous PRN  
 morphine injection 2 mg  2 mg IntraVENous Q2H PRN  
 melatonin tablet 3 mg  3 mg Oral QHS PRN  
 albumin human 5% (BUMINATE) solution 25 g  25 g IntraVENous Q2H PRN  
 influenza vaccine 2019-20 (6 mos+)(PF) (FLUARIX/FLULAVAL/FLUZONE QUAD) injection 0.5 mL  0.5 mL IntraMUSCular PRIOR TO DISCHARGE  
 [Held by provider] aspirin delayed-release tablet 81 mg  81 mg Oral DAILY  tamsulosin (FLOMAX) capsule 0.8 mg  0.8 mg Oral DAILY  allopurinol (ZYLOPRIM) tablet 100 mg  100 mg Oral DAILY No Known Allergies Objective: 
Vitals:   
Vitals:  
 11/12/19 0400 11/12/19 0500 11/12/19 0600 11/12/19 0700 BP: 107/86 112/83 (!) 103/91 99/82 Pulse: 75 71 75 84 Resp: 15 15 14 22 Temp: 98.2 °F (36.8 °C) SpO2: 94%   94% Weight: 87.5 kg (192 lb 14.4 oz) Height:      
 
Intake and Output: No intake/output data recorded. 11/10 1901 - 11/12 0700 In: 1400.5 [I.V.:1400.5] Out: 0469 Navin Huertas Physical Examination: 
Pt intubated     No 
General: Awake and alert Neck:  Supple, no mass Resp:  Lungs few dependent rales CV:  RRR,  no murmur or rub, trace b/l LE edema GI:  Soft, NT, + Bowel sounds Neurologic:  AOx3, nonfocal exam 
Psych:             Normal mood and affect Skin:  No Rash :  Lizama w/ blood-tinged urine [x]    High complexity decision making was performed 
[]    Patient is at high-risk of decompensation with multiple organ involvement Lab Data Personally Reviewed: I have reviewed all the pertinent labs, microbiology data and radiology studies during assessment. Recent Labs 11/12/19 
0409 11/11/19 
0437 11/10/19 
0423  136 134* K 3.5 3.3* 4.0  
 101 101 CO2 29 28 29 GLU 86 76 89 BUN 13 13 16 CREA 1.25 0.97 1.25  
CA 8.9 8.6 8.5 MG 2.0 2.1 2.1 ALB 2.4* 2.2* 2.3*  
SGOT 27 28 29 ALT 22 24 30 INR 1.2* 1.2* 1.1 Recent Labs 11/12/19 
0409 11/11/19 
0437 11/10/19 
0423 WBC 3.8* 4.4 4.8 HGB 8.7* 8.5* 8.6* HCT 28.1* 27.5* 28.0*  
* 93* 105* No results found for: SDES Lab Results Component Value Date/Time Culture result: SERRATIA MARCESCENS (A) 10/31/2019 10:05 AM  
 Culture result: SERRATIA MARCESCENS (2ND COLONY TYPE/STRAIN) (A) 10/31/2019 10:05 AM  
 Culture result: ENTEROCOCCUS FAECALIS GROUP D (A) 10/31/2019 10:05 AM  
 Culture result: NO GROWTH 5 DAYS 10/25/2019 07:14 PM  
 Culture result: ENTEROBACTER CLOACAE (A) 06/26/2019 12:25 PM  
 
Recent Results (from the past 24 hour(s)) GLUCOSE, POC Collection Time: 11/11/19  1:39 PM  
Result Value Ref Range Glucose (POC) 86 65 - 100 mg/dL Performed by JED FELTON   
GLUCOSE, POC Collection Time: 11/11/19  4:35 PM  
Result Value Ref Range Glucose (POC) 113 (H) 65 - 100 mg/dL  Performed by JED FELTON   
GLUCOSE, POC  
 Collection Time: 11/11/19 10:00 PM  
Result Value Ref Range Glucose (POC) 115 (H) 65 - 100 mg/dL Performed by Naomi BRAVO Collection Time: 11/12/19  4:09 AM  
Result Value Ref Range  (H) 85 - 241 U/L  
NT-PRO BNP Collection Time: 11/12/19  4:09 AM  
Result Value Ref Range NT pro-BNP 7,552 (H) <125 PG/ML  
LACTIC ACID Collection Time: 11/12/19  4:09 AM  
Result Value Ref Range Lactic acid 0.9 0.4 - 2.0 MMOL/L  
PROTHROMBIN TIME + INR Collection Time: 11/12/19  4:09 AM  
Result Value Ref Range INR 1.2 (H) 0.9 - 1.1 Prothrombin time 11.8 (H) 9.0 - 11.1 sec PTT Collection Time: 11/12/19  4:09 AM  
Result Value Ref Range aPTT 27.9 22.1 - 32.0 sec  
 aPTT, therapeutic range     58.0 - 77.0 SECS  
CRP, HIGH SENSITIVITY Collection Time: 11/12/19  4:09 AM  
Result Value Ref Range CRP, High sensitivity >9.5 mg/L MAGNESIUM Collection Time: 11/12/19  4:09 AM  
Result Value Ref Range Magnesium 2.0 1.6 - 2.4 mg/dL CBC W/O DIFF Collection Time: 11/12/19  4:09 AM  
Result Value Ref Range WBC 3.8 (L) 4.1 - 11.1 K/uL  
 RBC 2.95 (L) 4.10 - 5.70 M/uL HGB 8.7 (L) 12.1 - 17.0 g/dL HCT 28.1 (L) 36.6 - 50.3 % MCV 95.3 80.0 - 99.0 FL  
 MCH 29.5 26.0 - 34.0 PG  
 MCHC 31.0 30.0 - 36.5 g/dL RDW 21.3 (H) 11.5 - 14.5 % PLATELET 801 (L) 379 - 400 K/uL MPV 10.5 8.9 - 12.9 FL  
 NRBC 0.0 0  WBC ABSOLUTE NRBC 0.00 0.00 - 0.01 K/uL METABOLIC PANEL, COMPREHENSIVE Collection Time: 11/12/19  4:09 AM  
Result Value Ref Range Sodium 137 136 - 145 mmol/L Potassium 3.5 3.5 - 5.1 mmol/L Chloride 101 97 - 108 mmol/L  
 CO2 29 21 - 32 mmol/L Anion gap 7 5 - 15 mmol/L Glucose 86 65 - 100 mg/dL BUN 13 6 - 20 MG/DL Creatinine 1.25 0.70 - 1.30 MG/DL  
 BUN/Creatinine ratio 10 (L) 12 - 20 GFR est AA >60 >60 ml/min/1.73m2 GFR est non-AA 57 (L) >60 ml/min/1.73m2  Calcium 8.9 8.5 - 10.1 MG/DL  
 Bilirubin, total 1.2 (H) 0.2 - 1.0 MG/DL  
 ALT (SGPT) 22 12 - 78 U/L  
 AST (SGOT) 27 15 - 37 U/L Alk. phosphatase 124 (H) 45 - 117 U/L Protein, total 5.8 (L) 6.4 - 8.2 g/dL Albumin 2.4 (L) 3.5 - 5.0 g/dL Globulin 3.4 2.0 - 4.0 g/dL A-G Ratio 0.7 (L) 1.1 - 2.2 GLUCOSE, POC Collection Time: 11/12/19  7:08 AM  
Result Value Ref Range Glucose (POC) 91 65 - 100 mg/dL Performed by Kalina Guevara Total time spent with patient:  xxx   min. Care Plan discussed with: 
Patient Family RN Consulting Physician [facility]     
 
I have reviewed the flowsheets. Chart and Pertinent Notes have been reviewed. No change in PMH ,family and social history from Consult note.  
 
 
Nikolai Appiah MD

## 2019-11-12 NOTE — PROCEDURES
[address] [place]  PULMONARY FUNCTION TEST Name:  Kallie Boogie 
MR#:  [id number] :  1950 ACCOUNT #:  [de-identified] DATE OF SERVICE:  2019 CLINICAL INDICATION:  Preoperative evaluation. Spirometry and bronchodilator studies performed. Spirometry reveals a mild reduction in vital capacity and FEV1, which appears secondary to a restrictive ventilatory defect. There is no significant improvement in the vital capacity or FEV1 with additional bronchodilators. The flow volume loop is consistent with combined obstructive and restrictive pattern. MD KATHLEEN Hudson/[id number]/[email or web address] 
D:  2019 13:16 
T:  2019 21:49 JOB #:  [id number]

## 2019-11-12 NOTE — PROGRESS NOTES
[address] [facility] [address] [facility] in [place], [place] Inpatient Progress Note Patient name: Anya Bingham Patient : 1950 Patient MRN: [id number] Attending MD: Mitch Ham MD 
Date of service: 19 CHIEF COMPLAINT: 
Cardiogenic shock HPI:  
Anya Bingham is a 71y.o. year old pleasant white male with a history of HTN, HLD, JOSE, CAD s/p cardiac arrest VFib s/p CABG () c/b sternal would infection and sternectomy, ischemic cardiomyopathy LVEF 15-20%, s/p ICD and with LBBB. He was admitted with acute on chronic systolic heart failure with massive volume overload > 20 lbs, in the setting of atrial fibrillation s/p failed DCCV and underwent DCCV and RHC on . S/p BiVICD on 19 with Dr. Gio Lantigua. He was discharged home home on IV milrinone on 19. He has been followed closely by Dr. Ethel Sinclair and the [facility]. Mr. Durga Fay remained progressively more dyspneic with worsening renal function which prompted his elective admission to [facility] for LVAD evaluation and implant.  
  
Impression/Plan: NYHA Class IV, cardiogenic shock, Impella 5.0 Chronic systolic heart failure due to ICM, NYHA Class IV, cardiogenic shock on Impella 5.0 support Continue current impella speed at P8; cannot tolerate lower speeds Impella dislodged and repositioned, continue cefazolin indefinitely due to risk of infection No AC due to hematuria, epistaxis Goal CVP 10-12 mmHg - transduce CVP via PICC Decrease Bumex to 1 mg IV q6h PRN CVP > 12 mmHg Continue CAROL hose to mobilize LE fluid No ACE/ARB/ARNi in anticipation of surgery No AA due to hyponatremia and upcoming surgery LVAD evaluation in progress:  
 - Cystoscopy 0730  
 - LHC 1200  
 - DT letter received CKD 3, atrophic left kidney Appreciate Nephrology assistance Cr markedly improved with Impella support Continue current support at P-8 Decrease Bumex to 1 mg IV PRN CVP > 12  
 Avoid IVVD prior to [facility] Avoid nephrotoxins Trend labs CAD s/p CABG Intolerant of ASA due to thrombocytopenia No BB due to hypotension Hold statin due to recent hepatic failure LHC in AM  
 
Hx of VF arrest s/p AICD No recent shocks Keep K > 4 and Mg > 2 Hypertension, goal SBP < 120 mmHg Hydralazine 10 mg PO TID  
 
PAF Currently in NSR Intolerant to [facility] due to hematuria Continue amiodarone 100 mg PO daily Urinary retention, c/b serratia UTI and hematuria Appreciate Urology consult Cystoscopy scheduled for 0730 11/13 Hold AC for now Malnutrition Appreciate Nutritionist consult Check prealbumin in AM 
Continue Marinol 2.5 mg PO BID and six small meals daily COPD Appreciate Pulmonology assistance Continue nebs PRN On RA  
PFTs complete - unable to perform DLCO due to Impella Anemia Multifactorial, due to hemolysis + epistaxis + hematuria Intolerant of octreotide or doxycycline for AVM ppx due to prolonged QTc Repeat 12 lead EKG in AM  
 
Diverticulosis Noted on colonoscopy 11/11 Monitor Hx of sternal wound infection, sternectomy Dr. Yari Chacon has reviewed CTs, has surgical plan in place Vocal cord paralysis Improved Debility Continue PT/OT Appreciate assistance Ppx Protonix SCDs PT/OT  
PICC placed 6/25/19 Patient seen and examined. Data and note reviewed. I have discussed and agree with the plans as noted. 71year old male with a history of ICM and CS om Impella 5.0 support who is currently undergoing evaluation of LVAD as DT. He underwent EGD/colonscopy which revealed diverticulosis in the sigmoid and descending colon. Awaiting repeat LHC and cystoscopy. Thank you for allowing us to participate in your patient's care. Mounika Hong MD, Elaina Mcintosh Chief of Cardiology, [facility] Medical Director Calos Rueda [address], [address] Office [phone] Fax [phone] CARDIAC IMAGING: 
Tagged WBC negative 
  INTERVAL HISTORY: 
Volume overloaded Impella cannula dislodged, repositioned EGD/colonoscopy complete Hematuria resolved PHYSICAL EXAM: 
Visit Vitals BP (!) 88/72 Pulse 69 Temp 98 °F (36.7 °C) Resp 21 Ht 6' 2\" (1.88 m) Wt 192 lb 14.4 oz (87.5 kg) SpO2 93% BMI 24.77 kg/m² Impella (5.0) Performance Level (P Level): 8 Flow Rate L/min (0-2.5): 4 L/min Placement Signal (mmHg): Differential 5.0 (s/d 30-60/0 ex) Placement Signal (Systolic/Diastolic): 70/7 Motor Current (amp): (normal) Motor Current (Systolic/Diastolic): 384/073 Placement Monitoring: OK Purge Pressure (300-1100 mm Hg): 384 Purge Flow (ml/hr): 15.9 Sidearm Pressure Bag @ 300 mmHg/ flushed (Na with 5.0 Impella): No 
Power (AC/Battery): Yes Impella Pump Serial Number: [id number] Hemodynamics: 
 CVP: CVP (mmHg): 9 mmHg (11/12/19 1400) Review of Systems Constitutional: Negative for chills, fever and malaise/fatigue. HENT: Positive for hearing loss. Respiratory: Negative for cough and shortness of breath. Cardiovascular: Negative for chest pain, palpitations, leg swelling and PND. Gastrointestinal: Negative for heartburn, nausea and vomiting. Appetite greatly improved Musculoskeletal: Negative. Neurological: Negative for dizziness, focal weakness and headaches. Psychiatric/Behavioral: Negative. Physical Exam  
Constitutional: He is oriented to person, place, and time and well-developed, well-nourished, and in no distress. No distress. HENT:  
Head: Normocephalic. Neck: Normal range of motion. Neck supple. No JVD present. Cardiovascular: Normal rate, regular rhythm, normal heart sounds and intact distal pulses. Pulmonary/Chest: Effort normal and breath sounds normal. No respiratory distress. Abdominal: Soft. Bowel sounds are normal. He exhibits no distension. Musculoskeletal: Normal range of motion. He exhibits no edema. Neurological: He is alert and oriented to person, place, and time. Skin: Skin is warm and dry. No erythema. Psychiatric: Affect and judgment normal.  
Nursing note and vitals reviewed. PAST MEDICAL HISTORY: 
Past Medical History:  
Diagnosis Date  Degenerative disc disease, lumbar  Heart failure ([facility] [address]  High cholesterol  Hypertension  Paroxysmal atrial fibrillation ([facility] [address] 4/2/2019  Spinal stenosis PAST SURGICAL HISTORY: 
Past Surgical History:  
Procedure Laterality Date  COLONOSCOPY Left 11/11/2019 COLONOSCOPY at bedside performed by Lesli Watkins MD at [address]  HX CORONARY ARTERY BYPASS GRAFT    
 triple  HX HERNIA REPAIR    
 HX IMPLANTABLE CARDIOVERTER DEFIBRILLATOR  NE CARDIOVERSION ELECTIVE ARRHYTHMIA EXTERNAL N/A 6/10/2019 EP CARDIOVERSION performed by John Santamaria MD at [address] [facility] CATH LAB  NE CARDIOVERSION ELECTIVE ARRHYTHMIA EXTERNAL N/A 6/18/2019 EP CARD[facility]OVERSION [facility] [facility]  NE INSJ/RPLCMT PERM DFB W/TRNSVNS LDS 1/DUAL CHMBR N/A 6/21/2019 INSERT ICD BIV MULTI performed by Lorena Aranda MD at [address] CATH LAB  NE TCAT IMPL WRLS P-ART PRS SNR L-T HEMODYN MNTR N/A 9/18/2019 IMPLANT HEART FAILURE MONITORING DEVICE performed by Rey Bahena MD at [address] CATH LAB FAMILY HISTORY: 
Family History Problem Relation Age of Onset  Lung Disease Mother  Hypertension Mother Fadi Thomas Arthritis-osteo Mother  Heart Disease Mother  Heart Disease Father  Diabetes Father SOCIAL HISTORY: 
Social History Socioeconomic History  Marital status:  Spouse name: Not on file  Number of children: Not on file  Years of education: Not on file  Highest education level: Not on file Tobacco Use  Smoking status: Former Smoker Last attempt to quit: 2010 Years since quittin.9  Smokeless tobacco: Never Used Substance and Sexual Activity  Alcohol use: Not Currently Comment: rarely  Drug use: Never Other Topics Concern LABORATORY RESULTS: 
  
Labs Latest Ref Rng & Units 2019 2019 11/10/2019 2019 2019 2019 2019 WBC 4.1 - 11.1 K/uL 3.8(L) 4.4 4.8 5.0 4.9 - 4.6  
RBC 4.10 - 5.70 M/uL 2.95(L) 2.86(L) 2.89(L) 2.82(L) 2.89(L) - 2.69(L) Hemoglobin 12.1 - 17.0 g/dL 8.7(L) 8.5(L) 8.6(L) 8.2(L) 8.5(L) 8.5(L) 7. 8(L) Hematocrit 36.6 - 50.3 % 28. 1(L) 27. 5(L) 28. 0(L) 27. 0(L) 27. 7(L) 27. 9(L) 25. 4(L) MCV 80.0 - 99.0 FL 95.3 96.2 96.9 95.7 95.8 - 94.4 Platelets 043 - 072 K/uL 111(L) 93(L) 105(L) 98(L) 97(L) - 90(L) Lymphocytes 12 - 49 % - - - - - - - Monocytes 5 - 13 % - - - - - - - Eosinophils 0 - 7 % - - - - - - - Basophils 0 - 1 % - - - - - - - Albumin 3.5 - 5.0 g/dL 2. 4(L) 2. 2(L) 2. 3(L) 2. 3(L) 2. 4(L) - 2. 4(L) Calcium 8.5 - 10.1 MG/DL 8.9 8.6 8.5 8.6 8.7 - 8.7 SGOT 15 - 37 U/L 27 28 29 35 36 - 40(H) Glucose 65 - 100 mg/dL 86 76 89 79 85 - 90 BUN 6 - 20 MG/DL 13 13 16 15 17 - 18 Creatinine 0.70 - 1.30 MG/DL 1.25 0.97 1.25 1.13 1.25 - 1.19 Sodium 136 - 145 mmol/L 137 136 134(L) 135(L) 136 - 135(L) Potassium 3.5 - 5.1 mmol/L 3.5 3. 3(L) 4.0 4.0 4.1 - 3.6 TSH 0.36 - 3.74 uIU/mL - - - - - - -  
PSA 0.01 - 4.0 ng/mL - - - - - - -  
LDH 85 - 241 U/L 516(H) 422(H) 435(H) 438(H) 439(H) - 458(H) CEA ng/mL - - - - 6.6 - - Some recent data might be hidden Lab Results Component Value Date/Time TSH 2.40 10/25/2019 07:39 PM  
 TSH 2.45 2019 04:16 AM  
 
 
ALLERGY: 
No Known Allergies CURRENT MEDICATIONS: 
Current Facility-Administered Medications Medication Dose Route Frequency  epoetin yvonne-epbx (RETACRIT) injection 20,000 Units  20,000 Units SubCUTAneous Q TUE, THU & SAT  bumetanide (BUMEX) injection 1 mg  1 mg IntraVENous Q6H PRN  
  lactobac ac& pc-s.therm-b.anim (TIAN Q/RISAQUAD)  1 Cap Oral DAILY  sodium chloride (NS) flush 5-40 mL  5-40 mL IntraVENous PRN  
 simethicone (MYLICON) 83WL/2.9QC oral drops 80 mg  1.2 mL Oral Multiple  ceFAZolin (ANCEF) 2 g/20 mL in sterile water IV syringe  2 g IntraVENous Q8H  
 benzocaine-menthol (CEPACOL) lozenge 1 Lozenge  1 Lozenge Mucous Membrane PRN  
 hydrALAZINE (APRESOLINE) tablet 10 mg  10 mg Oral TID  
 0.9% sodium chloride infusion 250 mL  250 mL IntraVENous PRN  
 amiodarone (CORDARONE) tablet 100 mg  100 mg Oral DAILY  dronabinol (MARINOL) capsule 2.5 mg  2.5 mg Oral ACB&D  
 insulin lispro (HUMALOG) injection   SubCUTAneous AC&HS  sodium chloride (OCEAN) 0.65 % nasal squeeze bottle 2 Spray  2 Spray Both Nostrils QID  alteplase (CATHFLO) 1 mg in dextrose 5% 50 mL impella purge solution  1 mg Other TITRATE  pantoprazole (PROTONIX) tablet 40 mg  40 mg Oral ACB  dextrose 5% infusion  4-20 mL/hr IntraVENous CONTINUOUS  
 [Held by provider] bivalirudin (ANGIOMAX) 250 mg in dextrose 5% 250 mL infusion  4-20 mL/hr Other TITRATE  alteplase (CATHFLO) 1 mg in sterile water (preservative free) 1 mL injection  1 mg InterCATHeter PRN  
 bacitracin 500 unit/gram packet 1 Packet  1 Packet Topical PRN  
 sodium chloride (NS) flush 5-40 mL  5-40 mL IntraVENous Q8H  
 sodium chloride (NS) flush 5-40 mL  5-40 mL IntraVENous PRN  
 0.45% sodium chloride infusion  10 mL/hr IntraVENous CONTINUOUS  
 0.9% sodium chloride infusion  10 mL/hr IntraVENous CONTINUOUS  
 oxyCODONE IR (ROXICODONE) tablet 5 mg  5 mg Oral Q4H PRN  
 oxyCODONE IR (ROXICODONE) tablet 10 mg  10 mg Oral Q4H PRN  
 morphine injection 4 mg  4 mg IntraVENous Q2H PRN  
 naloxone (NARCAN) injection 0.4 mg  0.4 mg IntraVENous PRN  
 ondansetron (ZOFRAN) injection 4 mg  4 mg IntraVENous Q4H PRN  
 albuterol (PROVENTIL VENTOLIN) nebulizer solution 2.5 mg  2.5 mg Nebulization Q4H PRN  
  chlorhexidine (PERIDEX) 0.12 % mouthwash 10 mL  10 mL Oral Q12H  
 magnesium oxide (MAG-OX) tablet 400 mg  400 mg Oral BID  calcium chloride 1 g in 0.9% sodium chloride 250 mL IVPB  1 g IntraVENous PRN  
 bisacodyl (DULCOLAX) suppository 10 mg  10 mg Rectal DAILY PRN  
 senna-docusate (PERICOLACE) 8.6-50 mg per tablet 1 Tab  1 Tab Oral BID  polyethylene glycol (MIRALAX) packet 17 g  17 g Oral DAILY  ELECTROLYTE REPLACEMENT NOTE: Nurse to review Serum Potassium and Magnesuim levels and Initiate Electrolyte Replacement Protocol as needed  1 Each Other PRN  
 magnesium sulfate 1 g/100 ml IVPB (premix or compounded)  1 g IntraVENous PRN  
 glucose chewable tablet 16 g  4 Tab Oral PRN  
 glucagon (GLUCAGEN) injection 1 mg  1 mg IntraMUSCular PRN  
 dextrose 10% infusion 0-250 mL  0-250 mL IntraVENous PRN  
 morphine injection 2 mg  2 mg IntraVENous Q2H PRN  
 melatonin tablet 3 mg  3 mg Oral QHS PRN  
 albumin human 5% (BUMINATE) solution 25 g  25 g IntraVENous Q2H PRN  
 influenza vaccine 2019-20 (6 mos+)(PF) (FLUARIX/FLULAVAL/FLUZONE QUAD) injection 0.5 mL  0.5 mL IntraMUSCular PRIOR TO DISCHARGE  
 [Held by provider] aspirin delayed-release tablet 81 mg  81 mg Oral DAILY  tamsulosin (FLOMAX) capsule 0.8 mg  0.8 mg Oral DAILY  allopurinol (ZYLOPRIM) tablet 100 mg  100 mg Oral DAILY Thank you for allowing me to participate in this patient's care. TIERRA Sarkar Rueda [address] [place] [address], [address] Phone: [phone] Fax: [phone]

## 2019-11-12 NOTE — PROGRESS NOTES
Problem: Mobility Impaired (Adult and Pediatric) Goal: *Acute Goals and Plan of Care (Insert Text) Description FUNCTIONAL STATUS PRIOR TO ADMISSION: Patient was modified independent using a single point cane for functional mobility. Patient reports an increasingly sedentary lifestyle 2* fatigue and SOB/dyspnea. Retired (so is his wife). Patient reports x 3 falls within the last couple of weeks. Patient is wearing either nasal cannula or CPAP at night. LVAD work-up has been initiated. HOME SUPPORT PRIOR TO ADMISSION: The patient lived with his wife, but did not require physical assistance. Physical Therapy Goals Initiated 10/27/2019 1. Patient will move from supine to sit and sit to supine, scoot up and down and roll side to side in bed with independence within 7 days. 2.  Patient will perform sit to/from stand with supervision/set-up within 7 days. 3.  Patient will ambulate 150 feet with least restrictive assistive device and supervision/set-up within 7 days. 4.  Patient will ascend/descend 4 stairs with  handrail(s) with supervision/set-up within 7 days for functional strengthening and community reintegration. Nubia Kelly 5.  Patient will verbally and functionally recall 3/3 sternal precautions within 7 days in preparation for LVAD implantation. 6.  Patient will perform a mock power exchange for power module to/from battery with supervision/set-up within 7 days in preparation for LVAD implantation. Outcome: Progressing Towards Goal 
 PHYSICAL THERAPY TREATMENT Patient: Reid Baumann (75 y.o. male) Date: 11/12/2019 Diagnosis: Acute decompens[facility] [I50.9] <principal problem not specified> Procedure(s) (LRB): ESOPHAGOGASTRODUODENOSCOPY (EGD) (Left) COLONOSCOPY at bedside (Left) 1 Day Post-Op Precautions: Fall(R axillary impella) Chart, physical therapy assessment, plan of care and goals were reviewed. ASSESSMENT Patient continues with skilled PT services and is progressing towards goals. Patient received sitting in recliner, asleep but easily woken. Tolerated 2 gait trials, improved stability on second trial following RW height adjustment. Intermittent min A to steady (soft knee buckling throughout gait cycle) and min verbal cues for RW management and positioning. Reviewed AROM to assist with pain management of L upper trap and education on trigger points. Continues to require min A for sit>stand transfers when observing sternal precautions in prep for surgery. Current Level of Function Impacting Discharge (mobility/balance): min A Other factors to consider for discharge: LVAD tentative date 11/18/19 PLAN : 
Patient continues to benefit from skilled intervention to address the above impairments. Continue treatment per established plan of care. to address goals. Recommendation for discharge: (in order for the patient to meet his/her long term goals) To be determined: following LVAD This discharge recommendation: 
Has not yet been discussed the attending provider and/or case management IF patient discharges home will need the following DME: to be determined (TBD) SUBJECTIVE:  
Patient stated This nerve is hurting.  re: L upper trap ~T1 OBJECTIVE DATA SUMMARY:  
Critical Behavior: 
Neurologic State: Alert Orientation Level: Oriented X4 Cognition: Appropriate for age attention/concentration Safety/Judgement: Insight into deficits Functional Mobility Training: 
Transfers: 
Sit to Stand: Minimum assistance Stand to Sit: Contact guard assistance Balance: 
Sitting: Intact Standing: Impaired; With support Standing - Static: Good Standing - Dynamic : Fair Ambulation/Gait Training: 
Distance (ft): 100 Feet (ft)(x2) 
Assistive Device: Gait belt;Walker, rolling Ambulation - Level of Assistance: Contact guard assistance;Minimal assistance Gait Abnormalities: Decreased step clearance; Path deviations Therapeutic Exercises:  
Shoulder flexion AROM x5 Shoulder shrugs x5 Manual trigger point release/massage L upper trap ~T1 Pain Rating: 
L foot pain with gait Activity Tolerance:  
Fair, SpO2 stable on RA and requires rest breaks Please refer to the flowsheet for vital signs taken during this treatment. After treatment patient left in no apparent distress:  
Sitting in chair, Call bell within reach and OT present COMMUNICATION/COLLABORATION:  
The patients plan of care was discussed with: Occupational Therapist and Registered Nurse Claire Mays, PT, DPT Time Calculation: 24 mins

## 2019-11-12 NOTE — PROGRESS NOTES
Confirmed with Selena Fisher this morning the plan for him and his wife to meet with VAD RN and  on Friday at 1:00pm to go over VAD training/ caregiver role. Per Tara Rodríguez he picked up the heartmate dvd yesterday and he and his wife plan to watch it this evening. Bright Garza, MSW, LCSW Clinical  [phone]

## 2019-11-12 NOTE — PROGRESS NOTES
Problem: Self Care Deficits Care Plan (Adult) Goal: *Acute Goals and Plan of Care (Insert Text) Description FUNCTIONAL STATUS PRIOR TO ADMISSION: Patient was modified independent using a single point cane for functional mobility. Patient able to shower and dress himself. However, patient required assistance for household management from his wife. HOME SUPPORT: The patient lived alone with wife to provide assistance. Occupational Therapy Goals: 
 
Goals reviewed and continued/modified as follows 11/12/19 1. Patient will perform bathing with supervision/set-up within 7 day(s). 2.  Patient will perform lower body dressing with supervision/set-up within 7 day(s). 3.  Patient will perform grooming with modified independence within 7 day(s). 4.  Patient will perform toilet transfers with modified independence within 7 day(s). 5.  Patient will perform all aspects of toileting with supervision/set-up within 7 day(s). 6.  Patient will participate in upper extremity therapeutic exercise/activities with supervision/set-up for 5 minutes within 7 day(s). 7.  Patient will utilize energy conservation techniques during functional activities with verbal cues within 7 day(s). 8. Patient will verbalize LVAD terminology with verbal cues within 7 day (s) in preparation for implant. Continue all goals 10/30/19 post re-eval for Impella removal  
Initiated 10/28/2019 1. Patient will perform bathing with supervision/set-up within 7 day(s). 2.  Patient will perform lower body dressing with supervision/set-up within 7 day(s). 3.  Patient will perform grooming with modified independence within 7 day(s). 4.  Patient will perform toilet transfers with modified independence within 7 day(s). 5.  Patient will perform all aspects of toileting with supervision/set-up within 7 day(s).  
6.  Patient will participate in upper extremity therapeutic exercise/activities with supervision/set-up for 5 minutes within 7 day(s). 7.  Patient will utilize energy conservation techniques during functional activities with verbal cues within 7 day(s). Outcome: Progressing Towards Goal 
 OCCUPATIONAL THERAPY TREATMENT/WEEKLY RE-EVALUATION Patient: Zay Wheeler (75 y.o. male) Date: 11/12/2019 Diagnosis: Acute decompensated heart failure ([facility] [I50.9] <principal problem not specified> Procedure(s) (LRB): ESOPHAGOGASTRODUODENOSCOPY (EGD) (Left) COLONOSCOPY at bedside (Left) 1 Day Post-Op Precautions: Fall(R axillary impella) Chart, occupational therapy assessment, plan of care, and goals were reviewed. ASSESSMENT Based on the objective data described below, the patient presents with generalized weakness, impaired balance, BLE edema, and decreased endurance (Impella P-8 support) with noted plan for patient LVAD implantation Monday 11/18. Patient educated on compensatory strategies for LB dressing today and OT facilitated FM strengthening. Patient will continue to benefit from OT services 3x/week to maximize patient safety and independence with ADL transfers and tasks in preparation for LVAD. Current Level of Function Impacting Discharge (ADLs): CGA-SBA for all ADLs PLAN : 
Goals have been updated based on progression since last assessment. Patient continues to benefit from skilled intervention to address the above impairments. Continue to follow patient 3 times a week to address goals. Recommend with staff: OOB x 3 to chair Recommend next OT session: standing ADLs; FM coordination Recommendation for discharge: (in order for the patient to meet his/her long term goals) To be determined: following LVAD implantation This discharge recommendation: 
Has not yet been discussed the attending provider and/or case management Equipment recommendations for successful discharge (if) home: TBD SUBJECTIVE:  
 Patient stated This works.  OBJECTIVE DATA SUMMARY:  
Cognitive/Behavioral Status: 
Neurologic State: Alert Orientation Level: Oriented X4 Cognition: Appropriate for age attention/concentration Perception: Appears intact Perseveration: No perseveration noted Safety/Judgement: Insight into deficits Functional Mobility and Transfers for ADLs: 
 
  
 
Transfers: 
Sit to Stand: Minimum assistance Balance: 
Sitting: Intact Standing: Impaired; With support Standing - Static: Good Standing - Dynamic : Fair ADL Intervention: OT facilitated FM strengthening exercises using moderate resistance therapy putty for digit flexion/extension. Lower Body Dressing Assistance Socks: Stand-by assistance; Compensatory technique training Cognitive Retraining Safety/Judgement: Insight into deficits Activity Tolerance:  
Good (however on Impella P-8 support) Please refer to the flowsheet for vital signs taken during this treatment. After treatment patient left in no apparent distress:  
Sitting in chair and Call bell within reach COMMUNICATION/COLLABORATION:  
The patients plan of care was discussed with: Physical Therapist and Registered Nurse Razia Mancini Time Calculation: 24 mins

## 2019-11-13 NOTE — OP NOTES
[address]  
OPERATIVE REPORT Name:  Carie Malhotra 
MR#:  [id number] :  1950 ACCOUNT #:  [de-identified] DATE OF SERVICE:  10/29/2019 Amended document - corrected CSN; 19 PREOPERATIVE DIAGNOSES: 
1. Cardiogenic shock. 2.  New York Heart Association Class IV acute on chronic systolic heart failure. POSTOPERATIVE DIAGNOSES: 
1. Cardiogenic shock. 2.  New York Heart Association Class IV acute on chronic systolic heart failure. PROCEDURES PERFORMED: 
1. Right axillary artery exploration with construction of a 10 mm chimney graft used for introduction of a percutaneous left ventricular assist device (Impella 5.0; CPT code 14471). 2.  Insertion of percutaneous left ventricular assist device (Impella 5.0) through right axillary artery chimney graft (CPT code 75100). SURGEON:  Marty Reaves MD 
  
ASSISTANT:  Giovany Hammonds PA-C; assistance was needed due to difficulty of procedure, dissection, and identification of pertinent anatomy throughout the case ANESTHESIA:  General endotracheal anesthesia. COMPLICATIONS:  None. SPECIMENS REMOVED:  None. IMPLANTS:  None. ESTIMATED BLOOD LOSS:  10 mL. PROCEDURE:  The patient is a very pleasant 80-year-old gentleman who was recently diagnosed with cardiogenic shock. The patient has progressively decrease in his blood pressures despite inotropes and pressors. He is now being brought to the operating room to have an Impella device placed. The patient was brought to the operating room, underwent general endotracheal anesthesia without complications. Next, the patient had his chest prepped and draped in the usual sterile fashion. A right infraclavicular incision was made. Cautery dissection was used to dissect down to the level of the right axillary artery. An appropriate dose of heparin was made.   We placed a clamp, opened the artery with a 75 blade and further extended the arteriotomy with forward and reverse Wallace. We then performed a 10-mm Hemashield graft to right axillary artery anastomosis using a 5-0 Prolene suture. We then glued it, released the clamps, accessed the left ventricular cavity using an AL2 catheter and J-wire, exchanged it over to the Impella wire, then brought it in, removed the wire, started the Impella, and there was good flow. Vicryl suture was used to close the incision. I was present for the entire procedure. MD ROMÁN [id number]/[id number] 
D:  10/30/2019 10:45 
T:  10/30/2019 14:32 
JOB #:  [id number]

## 2019-11-13 NOTE — PROGRESS NOTES
[facility] 
 [address] [facility], [address] [facility] Phone: [phone]   [phone]   
  
Nephrology Progress Note Sona Kiser     1950     [id number] Date of Admission : 10/25/2019 
11/13/19 CC:  Follow up for JUAN J, CKD, Hyponatremia Assessment and Plan JUAN J on CKD : 
- 2/2 CRS and resolved and better than baseline - Diuretics being adjusted by AHF team  
- requested cardiology to get wedge pressure during cath today - Do not see a need for IVF for cardiac cath CKD III at baseline w/ L renal atrophy: - NM scan shows equal function 
- baseline Cr elevated from CRS and urinary retention issues - Cr much better than baseline  
- agree w/ holding ace/arb in preparation for VAD Gross hematuria, BPH w/ retention: 
- off CBI now - CySY today  
- need for neal to be determine by urology post Cystoscopy Serratia and Enteroccocus UTI: 
- per ID Hyponatremia: 
- 2/2 CHF  
- watch for now Hoarseness, vocal cord paralysis, mediastinal adenopathy: 
- will need eventual PET/CT 
  
Acute on Chronic HFrEF  
- EF 16-20%, NYHA Class IV , hx of VF arrest - s/p AICD 
- Impella insertion 10/29 
- LVAD eval underwaty 
  
JOSE on CPAP  
  
PAF s/p DCCV 6/19 
  
Chronic Anemia: 
- from CKD and iron deficiency - s/p IV iron, now on PAVEL 
- hgb stable 
- EGD normal. CSY - Diverticulosis Interval History:   
Seen and examined. Reports dry mouth CVP has good wave form but not sure of its accuracy Bumex held due to low CVP Remains edematous Weight trends but had 10 lb weight gain in the last 7 days (standing scale weights ) Review of Systems: Pertinent items are noted in HPI. Current Medications:  
Current Facility-Administered Medications Medication Dose Route Frequency  potassium chloride 20 mEq in 50 ml IVPB  20 mEq IntraVENous ONCE  
 epoetin yvonne-epbx (RETACRIT) injection 20,000 Units  20,000 Units SubCUTAneous Q TUE, THU & SAT  
  bumetanide (BUMEX) injection 1 mg  1 mg IntraVENous Q6H PRN  
 lactobac ac& pc-s.therm-b.anim (TIAN Q/RISAQUAD)  1 Cap Oral DAILY  sodium chloride (NS) flush 5-40 mL  5-40 mL IntraVENous PRN  
 simethicone (MYLICON) 07JD/8.1CP oral drops 80 mg  1.2 mL Oral Multiple  ceFAZolin (ANCEF) 2 g/20 mL in sterile water IV syringe  2 g IntraVENous Q8H  
 benzocaine-menthol (CEPACOL) lozenge 1 Lozenge  1 Lozenge Mucous Membrane PRN  
 hydrALAZINE (APRESOLINE) tablet 10 mg  10 mg Oral TID  
 0.9% sodium chloride infusion 250 mL  250 mL IntraVENous PRN  
 amiodarone (CORDARONE) tablet 100 mg  100 mg Oral DAILY  dronabinol (MARINOL) capsule 2.5 mg  2.5 mg Oral ACB&D  
 insulin lispro (HUMALOG) injection   SubCUTAneous AC&HS  sodium chloride (OCEAN) 0.65 % nasal squeeze bottle 2 Spray  2 Spray Both Nostrils QID  alteplase (CATHFLO) 1 mg in dextrose 5% 50 mL impella purge solution  1 mg Other TITRATE  pantoprazole (PROTONIX) tablet 40 mg  40 mg Oral ACB  dextrose 5% infusion  4-20 mL/hr IntraVENous CONTINUOUS  
 [Held by provider] bivalirudin (ANGIOMAX) 250 mg in dextrose 5% 250 mL infusion  4-20 mL/hr Other TITRATE  alteplase (CATHFLO) 1 mg in sterile water (preservative free) 1 mL injection  1 mg InterCATHeter PRN  
 bacitracin 500 unit/gram packet 1 Packet  1 Packet Topical PRN  
 sodium chloride (NS) flush 5-40 mL  5-40 mL IntraVENous Q8H  
 sodium chloride (NS) flush 5-40 mL  5-40 mL IntraVENous PRN  
 0.45% sodium chloride infusion  10 mL/hr IntraVENous CONTINUOUS  
 0.9% sodium chloride infusion  10 mL/hr IntraVENous CONTINUOUS  
 oxyCODONE IR (ROXICODONE) tablet 5 mg  5 mg Oral Q4H PRN  
 oxyCODONE IR (ROXICODONE) tablet 10 mg  10 mg Oral Q4H PRN  
 morphine injection 4 mg  4 mg IntraVENous Q2H PRN  
 naloxone (NARCAN) injection 0.4 mg  0.4 mg IntraVENous PRN  
 ondansetron (ZOFRAN) injection 4 mg  4 mg IntraVENous Q4H PRN  
  albuterol (PROVENTIL VENTOLIN) nebulizer solution 2.5 mg  2.5 mg Nebulization Q4H PRN  chlorhexidine (PERIDEX) 0.12 % mouthwash 10 mL  10 mL Oral Q12H  
 magnesium oxide (MAG-OX) tablet 400 mg  400 mg Oral BID  calcium chloride 1 g in 0.9% sodium chloride 250 mL IVPB  1 g IntraVENous PRN  
 bisacodyl (DULCOLAX) suppository 10 mg  10 mg Rectal DAILY PRN  
 senna-docusate (PERICOLACE) 8.6-50 mg per tablet 1 Tab  1 Tab Oral BID  polyethylene glycol (MIRALAX) packet 17 g  17 g Oral DAILY  ELECTROLYTE REPLACEMENT NOTE: Nurse to review Serum Potassium and Magnesuim levels and Initiate Electrolyte Replacement Protocol as needed  1 Each Other PRN  
 magnesium sulfate 1 g/100 ml IVPB (premix or compounded)  1 g IntraVENous PRN  
 glucose chewable tablet 16 g  4 Tab Oral PRN  
 glucagon (GLUCAGEN) injection 1 mg  1 mg IntraMUSCular PRN  
 dextrose 10% infusion 0-250 mL  0-250 mL IntraVENous PRN  
 morphine injection 2 mg  2 mg IntraVENous Q2H PRN  
 melatonin tablet 3 mg  3 mg Oral QHS PRN  
 albumin human 5% (BUMINATE) solution 25 g  25 g IntraVENous Q2H PRN  
 influenza vaccine 2019-20 (6 mos+)(PF) (FLUARIX/FLULAVAL/FLUZONE QUAD) injection 0.5 mL  0.5 mL IntraMUSCular PRIOR TO DISCHARGE  
 [Held by provider] aspirin delayed-release tablet 81 mg  81 mg Oral DAILY  tamsulosin (FLOMAX) capsule 0.8 mg  0.8 mg Oral DAILY  allopurinol (ZYLOPRIM) tablet 100 mg  100 mg Oral DAILY No Known Allergies Objective: 
Vitals:   
Vitals:  
 11/13/19 0300 11/13/19 0400 11/13/19 0500 11/13/19 0600 BP: 101/82 101/84 103/77 105/88 Pulse: 76 71 72 76 Resp: 12 14 17 16 Temp:  98.2 °F (36.8 °C) SpO2: 92% 94% 94% 96% Weight:  88.4 kg (194 lb 14.2 oz) Height:      
 
Intake and Output: 
No intake/output data recorded. 11/11 1901 - 11/13 0700 In: 1084.6 [P.O.:236; I.V.:848.6] Out: 2370 [QPYQX:9683] Physical Examination: 
 
General: No distress Neck:  Supple, no mass Resp:  Lungs few dependent rales CV:  Impella sounds  2+ b/l LE edema GI:  Soft, NT, + Bowel sounds Neurologic:  AOx3, nonfocal exam 
:  Lizama w/ clear urine [x]    High complexity decision making was performed 
[]    Patient is at high-risk of decompensation with multiple organ involvement Lab Data Personally Reviewed: I have reviewed all the pertinent labs, microbiology data and radiology studies during assessment. Recent Labs 11/13/19 0424 11/12/19 0409 11/11/19 
5419 * 137 136  
K 3.8 3.5 3.3*  
CL 99 101 101 CO2 29 29 28 GLU 87 86 76 BUN 12 13 13 CREA 1.31* 1.25 0.97 CA 8.6 8.9 8.6 MG 2.1 2.0 2.1 ALB 2.3* 2.4* 2.2*  
SGOT 25 27 28 ALT 14 22 24 INR 1.1 1.2* 1.2* Recent Labs 11/13/19 0424 11/12/19 
0409 11/11/19 
1030 WBC 3.8* 3.8* 4.4 HGB 8.5* 8.7* 8.5* HCT 27.5* 28.1* 27.5*  
* 111* 93* No results found for: [facility] Lab Results Component Value Date/Time Culture result: SERRATIA MARCESCENS (A) 10/31/2019 10:05 AM  
 Culture result: SERRATIA MARCESCENS (2ND COLONY TYPE/STRAIN) (A) 10/31/2019 10:05 AM  
 Culture result: ENTEROCOCCUS FAECALIS GROUP D (A) 10/31/2019 10:05 AM  
 Culture result: NO GROWTH 5 DAYS 10/25/2019 07:14 PM  
 Culture result: ENTEROBACTER CLOACAE (A) 06/26/2019 12:25 PM  
 
Recent Results (from the past 24 hour(s)) GLUCOSE, POC Collection Time: 11/12/19 12:47 PM  
Result Value Ref Range Glucose (POC) 124 (H) 65 - 100 mg/dL Performed by Neha Walters GLUCOSE, POC Collection Time: 11/12/19 10:08 PM  
Result Value Ref Range Glucose (POC) 103 (H) 65 - 100 mg/dL Performed by Sylvia Diallo LACTIC ACID Collection Time: 11/13/19  4:24 AM  
Result Value Ref Range Lactic acid 0.7 0.4 - 2.0 MMOL/L  
PROTHROMBIN TIME + INR Collection Time: 11/13/19  4:24 AM  
Result Value Ref Range INR 1.1 0.9 - 1.1 Prothrombin time 11.5 (H) 9.0 - 11.1 sec PTT Collection Time: 11/13/19  4:24 AM  
Result Value Ref Range aPTT 27.8 22.1 - 32.0 sec  
 aPTT, therapeutic range     58.0 - 77.0 SECS  
PROCALCITONIN Collection Time: 11/13/19  4:24 AM  
Result Value Ref Range Procalcitonin 0.1 ng/mL CBC W/O DIFF Collection Time: 11/13/19  4:24 AM  
Result Value Ref Range WBC 3.8 (L) 4.1 - 11.1 K/uL  
 RBC 2.86 (L) 4.10 - 5.70 M/uL HGB 8.5 (L) 12.1 - 17.0 g/dL HCT 27.5 (L) 36.6 - 50.3 % MCV 96.2 80.0 - 99.0 FL  
 MCH 29.7 26.0 - 34.0 PG  
 MCHC 30.9 30.0 - 36.5 g/dL RDW 21.2 (H) 11.5 - 14.5 % PLATELET 201 (L) 375 - 400 K/uL MPV 9.4 8.9 - 12.9 FL  
 NRBC 0.0 0  WBC ABSOLUTE NRBC 0.00 0.00 - 0.01 K/uL METABOLIC PANEL, COMPREHENSIVE Collection Time: 11/13/19  4:24 AM  
Result Value Ref Range Sodium 133 (L) 136 - 145 mmol/L Potassium 3.8 3.5 - 5.1 mmol/L Chloride 99 97 - 108 mmol/L  
 CO2 29 21 - 32 mmol/L Anion gap 5 5 - 15 mmol/L Glucose 87 65 - 100 mg/dL BUN 12 6 - 20 MG/DL Creatinine 1.31 (H) 0.70 - 1.30 MG/DL  
 BUN/Creatinine ratio 9 (L) 12 - 20 GFR est AA >60 >60 ml/min/1.73m2 GFR est non-AA 54 (L) >60 ml/min/1.73m2 Calcium 8.6 8.5 - 10.1 MG/DL Bilirubin, total 0.8 0.2 - 1.0 MG/DL  
 ALT (SGPT) 14 12 - 78 U/L  
 AST (SGOT) 25 15 - 37 U/L Alk. phosphatase 125 (H) 45 - 117 U/L Protein, total 5.5 (L) 6.4 - 8.2 g/dL Albumin 2.3 (L) 3.5 - 5.0 g/dL Globulin 3.2 2.0 - 4.0 g/dL A-G Ratio 0.7 (L) 1.1 - 2.2 PREALBUMIN Collection Time: 11/13/19  4:24 AM  
Result Value Ref Range Prealbumin 19.4 (L) 20.0 - 40.0 mg/dL LD Collection Time: 11/13/19  4:24 AM  
Result Value Ref Range  (H) 85 - 241 U/L MAGNESIUM Collection Time: 11/13/19  4:24 AM  
Result Value Ref Range Magnesium 2.1 1.6 - 2.4 mg/dL NT-PRO BNP Collection Time: 11/13/19  4:24 AM  
Result Value Ref Range NT pro-BNP 6,781 (H) <125 PG/ML  
EKG, 12 LEAD, INITIAL Collection Time: 11/13/19  4:40 AM  
Result Value Ref Range Ventricular Rate 74 BPM  
 Atrial Rate 66 BPM  
 QRS Duration 166 ms  
 Q-T Interval 488 ms QTC Calculation (Bezet) 541 ms Calculated R Axis -15 degrees Calculated T Axis 157 degrees Diagnosis Electronic ventricular pacemaker When compared with ECG of 11-NOV-2019 04:49, No significant change was found Total time spent with patient:  xxx   min. Care Plan discussed with: 
Patient Family RN Consulting Physician [address] [facility],      
 
I have reviewed the flowsheets. Chart and Pertinent Notes have been reviewed. No change in PMH ,family and social history from Consult note.  
 
 
Shayne Gaona MD

## 2019-11-13 NOTE — PROGRESS NOTES
NYHA class IV A/C systolic heart failure Low EF (10%) Inotrope dependent Malnutrition  
LVAD Work-up Epistaxis Hematuria 
  
Pre-Op Plan (11/18/19 implant) : 
1) Platelets a little low - will need to be above 150 before surgery 2) PA catheter Thursday evening 3) Hold bivalirudin at [address] to D5) 4) Start Vanc when we get in the room 5) Make sure bend relief lock is in place 6) Start drips after we finish the inflow cannula 7) Needs colonoscopy 8) Needs [facility]  
  
  
Op Plan: 
1) Will not need sternal saw - can go right to incision and dissection (need ANDREA bar's) 2) Dissection:  
            T. The RV rises above level of posterior sternal plate inferiorly (start high) 
            B. The innominate vein is unusually low (can feel for the wires) 
            C. Will likely need to incorporate proximal CABG anastomosis in side-biter                  for outflow graft             S. LIMA to LAD graft is fairly lateral and should be out of the way             E. Will need to get aorta and LV apex out as usual 
            F. Place drive-line (somewhat thin adipose tissue) 3) Cannulation (give heparin): 
            D. Left groin 25 Fr venous (will save the right in case we need RVAD;  
                HVZ place triple lumen early) 
            B. Build 10 mm graft side-arm off Impella graft (glue; will need to extend                  axiallary graft back); have 3/8 connector already attached  
4) Implant: 
            H. Remove Impella, de-clot graft (#4 Shakila), back-flush, hook up to bypass  
                [id number], go on bypass (place extra sucker in axillary wound             K. Packs, find apex, and core (remove trabeculations; suture in CO2 and                  pump sucker lines) 
            C. Place (4) 2-0 Ethibond sutures, bring them through inflow cannula sewing  
                 ring, and tie down 
            D. Place (2) 4-0 SH running sutures, bioglue             E. Attach pump, remove packs, and lower into well 
            F. Clamp outflow graft, turn on drips, and turn on pump at 3000 
            G. Measure outflow graft (add 1 cm), side-biter, aortotomy, and outflow graft                  anastomosis             H. Place de-airing stitch and remove clamps  
            I. Come off bypass and up on LVAD as usual 
            J. Staple off axillary graft  
  
Chest / Abdominal CT scan:  
  
Sternum is  although it looks like he still has sternal bone; the right half is higher than the left half and will need to some down; should be able to close with 12 standard wires although the sternum is thin; back-up plan would be a weave  
  
RV is coming above the sternal edge somewhat at the inferior margin so need to be careful here 
  
Proximal comes off anterior portion of the aorta; may need to place side-biter such that it incorporates the proximal; needs a LHC to figure out which way the graft is going; no significant calcium in the ascending aorta; Impella in place  
  
LIMA to LAD graft is fairly lateral so should be out of the way for dissection  
  
Right atrium looks a little big 
  
Innominate vein is a little low so need to be carefull with dissection (should be able to feel the pace leads)  
  Bilateral common iliac arteries are severely calcified and narrowed down into the 4 mm range; will be somewhat hazardous trying to place femoral arterial cannula; will be best to extend out impella graft, build 10 mm side-arm, and go on through the axillary artery; will need to flush out the graft after we pull the Impella and before going on bypass (timing will be important here); will need to place the femoral venous line first 
  
Abdominal wall adipose tissue is thin so need to be careful with drive-line  
  
LHC - pending 
  
TTE - severely dilated LV cavity (7.36 cm) with reduced EF (10%); mild MR, large left atrium; previous TTE's did not reveal any AI; has moderate RV dysfunction (RVIDD 5.4, RV/LV ratio 0.73, TAPSE 1.4, mild TR; good free wall motion); little worried that sternal closure with compress his RV so will be prepare for temp RVAD support  
  
Carotids - normal 
  
ABIs - normal  
  
PFT's - FEV-1 2.25 (59% predicted)  
  
Epistaxis - resolved  
  
Hematuria - resolved  
  
Hgb 8.2, platelets 98, INR 1.1 
  
BUN 15, creatinine 1.13 
  
Bilirubin 1.3, ALT 28, AST 35, alk phos 130 
  
LDH - 438, lactic acid 0.9 
  
NT pro-BNP - 7103  
  
CEA - 6.6 (mildy elevated)  
  
CXR - mild pulmonary edema 
  
Impella - flow 3.5 L/min @ P-6 Looks good today Cystoscopy earlier Going down for LHC now Hgb and platelets look reasonable Creatinine bumped a little Bilirubin and other LFTs look good LDH and lactic acid reasonable Pro-calcitonin normal 
 
NT pro-BNP about the same Pre-albumin improving Impella - 3.5 L/min @ P-8 CXR - mild pulmonary edema Intake/Output Summary (Last 24 hours) at 11/13/2019 1523 Last data filed at 11/13/2019 8564 Gross per 24 hour Intake 562 ml Output 530 ml Net 32 ml Visit Vitals /81 Pulse 72 Temp 98 °F (36.7 °C) Resp 19 Ht 6' 2\" (1.88 m) Wt 194 lb 14.2 oz (88.4 kg) SpO2 98% BMI 25.02 kg/m² Risk of morbidity and mortality - high Medical decision making - high complexity Total critical care time - 30 minutes (CPT 68687)

## 2019-11-13 NOTE — PROGRESS NOTES
TRANSFER - OUT REPORT: 
 
Verbal report given to Sharda(name) chloe Kiser  being transferred to CCU(unit) for routine progression of care Report consisted of patients Situation, Background, Assessment and  
Recommendations(SBAR). Information from the following report(s) Procedure Summary and MAR was reviewed with the receiving nurse. Lines: PICC Double Lumen 63/96/73 Right;Basilic (Active) Central Line Being Utilized Yes 11/13/2019  9:00 AM  
Criteria for Appropriate Use Long term IV/antibiotic administration 11/13/2019  9:00 AM  
Site Assessment Clean, dry, & intact 11/13/2019  9:00 AM  
Phlebitis Assessment 0 11/13/2019  9:00 AM  
Infiltration Assessment 0 11/13/2019  9:00 AM  
Arm Circumference (cm) 32.5 cm 6/25/2019  4:49 PM  
Date of Last Dressing Change 11/04/19 11/13/2019  4:00 AM  
Dressing Status Clean, dry, & intact 11/13/2019  9:00 AM  
Action Taken Open ports on tubing capped 11/13/2019  9:00 AM  
External Catheter Length (cm) 0 centimeters 11/8/2019 12:00 PM  
Dressing Type Disk with Chlorhexadine gluconate (CHG); Transparent 11/13/2019  9:00 AM  
Hub Color/Line Status Red; Infusing 11/13/2019  9:00 AM  
Positive Blood Return (Site #1) Yes 11/13/2019  9:00 AM  
Hub Color/Line Status Purple; Infusing 11/13/2019  9:00 AM  
Positive Blood Return (Site #2) Yes 11/13/2019  9:00 AM  
Alcohol Cap Used Yes 11/13/2019  9:00 AM  
  
 
Opportunity for questions and clarification was provided. Patient transported with: 
 Registered Nurse

## 2019-11-13 NOTE — PROGRESS NOTES
0800:  Report received from [facility] MIKE. Pt on way off floor for cystoscopy procedure. 0930:  Pt back to room 1200:  Pt for cath this afternoon, told around 1230. Called cath lab and they report being about 1.5 hour behind. 1330:  Called cath lab. They are still waiting for room to open. 1500:  Called cath lab, waiting on cards to finish case then they will be up to get pt.  
 
1530:  Pt down to cath lab.  
 
1700 TRANSFER - IN REPORT: 
 
Verbal report received from Cortney(name) on [address]  being received from Cath lab(unit) for routine progression of care Report consisted of patients Situation, Background, Assessment and  
Recommendations(SBAR). Information from the following report(s) SBAR, Kardex, [facility] and Recent Results was reviewed with the receiving nurse. Opportunity for questions and clarification was provided. Assessment completed upon patients arrival to unit and care assumed. 1845:  Pt last 3 BP have been in 48'B systolic (MAPS in 00'M). Dr. Seble Magallanes notified and orders to give albumin. Also, pt rt nares bleeding slightly. Does not want nasal spray right now but pressure applied and packed with gauze. 2000:  Bedside shift change report given to [address] [place] [address] Ede (oncoming nurse) by Steve Tolentino (offgoing nurse). Report included the following information SBAR, Kardex, MAR and Recent Results.

## 2019-11-13 NOTE — ANESTHESIA PREPROCEDURE EVALUATION
Relevant Problems No relevant active problems Anesthetic History No history of anesthetic complications Review of Systems / Medical History Patient summary reviewed, nursing notes reviewed and pertinent labs reviewed Pulmonary Sleep apnea: CPAP Neuro/Psych Within defined limits Cardiovascular Hypertension CHF: NYHA Classification IV, orthopnea, PND and dyspnea on exertion Dysrhythmias : atrial fibrillation CAD and CABG Exercise tolerance: <4 METS Comments: EF 10%, S/p Sternectomy for sternal wound infection 
impella GI/Hepatic/Renal 
  
 
 
Renal disease: CRI Endo/Other Arthritis Other Findings Physical Exam 
 
Airway Mallampati: II 
TM Distance: > 6 cm Neck ROM: normal range of motion Mouth opening: Normal 
 
 Cardiovascular Rhythm: irregular Rate: normal 
 
Murmur (impella) Dental 
 
Dentition: Full lower dentures and Full upper dentures Pulmonary Breath sounds clear to auscultation Abdominal 
GI exam deferred Other Findings Anesthetic Plan ASA: 4 Anesthesia type: MAC and general - backup Monitoring Plan: Arterial line Induction: Intravenous Anesthetic plan and risks discussed with: Patient

## 2019-11-13 NOTE — PROCEDURES
Cardiac Catheterization Lab Procedure Note Patient: Priyanka Arora  MRN: [id number]  SSN: [id number] YOB: 1950 Age: 71 y.o. Sex: male Date of Procedure: 11/13/2019 Pre-procedure Diagnosis: End stage CHF Post-procedure Diagnosis: End stage CHF Procedure: Left Heart Cath with Bypass Grafts and Right Heart Cath :  Dr. Harrel Aschoff, MD 
 
Assistant(s):  None Anesthesia: Moderate Sedation Estimated Blood Loss: Less than 10 mL Specimens Removed: None Findings:  
Widely patent LIMA-LAD and widely patent SVG-Ramus Intermedius with a prominent valve seen in the SVG. Native RCA has minimal non-obstructive CAD. Native LM trifurcates into the LAD which is 100% occluded at its ostium, the ramus intermedius which has a 95% ostial stenosis and the true AV groove circumflex which has a 95% ostial stenosis. The only coronary territory that is not well-perfused is the true AV groove circumflex which gives off a diminutive OM1 and a small LPL branch with the true AV groove circumflex continuing in the AV groove and giving off an atrial branch. RHC showed normal right (RA mean 5) and left (PCW mean 16, notably V wave of 35 suggestive of mitral regurgitation; LVEDP 16) filling pressures with mildly elevated PA pressures (PA mean 30), normal PVR (1.89 [place]) and Brad CI 3.44 L/min/m2 (using 125*BSA formula). He likely would not benefit from revascularization of the true AV groove circumflex due to the small vascular territory that this subtends. Also, this territory may not be viable and furthermore, revascularization of this territory is unlikely to have a significant impact on the patient's clinical outcomes with LVAD therapy. Results discussed with the patient and the referring physician, Dr. Doris Jorgensen. Full report to follow. Complications: None Implants:  None.  
 
Signed by:  Harrel Aschoff, MD  11/13/2019  5:09 PM

## 2019-11-13 NOTE — PROGRESS NOTES
Physical Therapy 11/13/2019 Patient is currently off the floor for surgery. Will continue to follow for PT services. Thank you, Bianka Santos, PT, DPT

## 2019-11-13 NOTE — PROGRESS NOTES
Problem: Falls - Risk of 
Goal: *Absence of Falls Description Document Giuliana Reyes Fall Risk and appropriate interventions in the flowsheet. Outcome: Progressing Towards Goal 
Note:  
Fall Risk Interventions: 
Mobility Interventions: Communicate number of staff needed for ambulation/transfer Mentation Interventions: Adequate sleep, hydration, pain control, Evaluate medications/consider consulting pharmacy Medication Interventions: Evaluate medications/consider consulting pharmacy Elimination Interventions: Call light in reach, Patient to call for help with toileting needs, Toileting schedule/hourly rounds History of Falls Interventions: Evaluate medications/consider consulting pharmacy Problem: Patient Education: Go to Patient Education Activity Goal: Patient/Family Education Outcome: Progressing Towards Goal 
  
Problem: Pain Goal: *Control of Pain Outcome: Progressing Towards Goal 
Goal: *PALLIATIVE CARE:  Alleviation of Pain Outcome: Progressing Towards Goal 
  
Problem: Patient Education: Go to Patient Education Activity Goal: Patient/Family Education Outcome: Progressing Towards Goal 
  
Problem: Pressure Injury - Risk of 
Goal: *Prevention of pressure injury Description Document Mich Scale and appropriate interventions in the flowsheet. Outcome: Progressing Towards Goal 
Note:  
Pressure Injury Interventions: 
Sensory Interventions: Keep linens dry and wrinkle-free, Maintain/enhance activity level Moisture Interventions: Absorbent underpads, Apply protective barrier, creams and emollients, Internal/External urinary devices, Minimize layers Activity Interventions: Increase time out of bed Mobility Interventions: Assess need for specialty bed, Pressure redistribution bed/mattress (bed type) Nutrition Interventions: Document food/fluid/supplement intake Friction and Shear Interventions: Apply protective barrier, creams and emollients, Minimize layers Problem: Patient Education: Go to Patient Education Activity Goal: Patient/Family Education Outcome: Progressing Towards Goal 
  
Problem: Infection - Risk of, Multi-drug Resistant Organism Colonization (MDRO) Goal: *Absence of MDRO colonization Outcome: Progressing Towards Goal 
Goal: *Absence of infection signs and symptoms Outcome: Progressing Towards Goal 
  
Problem: Patient Education: Go to Patient Education Activity Goal: Patient/Family Education Outcome: Progressing Towards Goal 
  
Problem: Heart Failure: Discharge Outcomes Goal: *Demonstrates ability to perform prescribed activity without shortness of breath or discomfort Outcome: Progressing Towards Goal 
Goal: *Left ventricular function assessment completed prior to or during stay, or planned for post-discharge Outcome: Progressing Towards Goal 
Goal: *ACEI prescribed if LVEF less than 40% and no contraindications or ARB prescribed Outcome: Progressing Towards Goal 
Goal: *Verbalizes understanding and describes prescribed diet Outcome: Progressing Towards Goal 
Goal: *Verbalizes understanding/describes prescribed medications Outcome: Progressing Towards Goal 
Goal: *Describes available resources and support systems Description 
(eg: Home Health, Palliative Care, Advanced Medical Directive) Outcome: Progressing Towards Goal 
Goal: *Describes smoking cessation resources Outcome: Progressing Towards Goal 
Goal: *Understands and describes signs and symptoms to report to providers(Stroke Metric) Outcome: Progressing Towards Goal 
Goal: *Describes/verbalizes understanding of follow-up/return appt Description 
(eg: to physicians, diabetes treatment coordinator, and other resources Outcome: Progressing Towards Goal 
Goal: *Describes importance of continuing daily weights and changes to report to physician Outcome: Progressing Towards Goal 
  
Problem: Diabetes Self-Management Goal: *Disease process and treatment process Description Define diabetes and identify own type of diabetes; list 3 options for treating diabetes. Outcome: Progressing Towards Goal 
Goal: *Incorporating nutritional management into lifestyle Description Describe effect of type, amount and timing of food on blood glucose; list 3 methods for planning meals. Outcome: Progressing Towards Goal 
Goal: *Incorporating physical activity into lifestyle Description State effect of exercise on blood glucose levels. Outcome: Progressing Towards Goal 
Goal: *Developing strategies to promote health/change behavior Description Define the ABC's of diabetes; identify appropriate screenings, schedule and personal plan for screenings. Outcome: Progressing Towards Goal 
Goal: *Using medications safely Description State effect of diabetes medications on diabetes; name diabetes medication taking, action and side effects. Outcome: Progressing Towards Goal 
Goal: *Monitoring blood glucose, interpreting and using results Description Identify recommended blood glucose targets  and personal targets. Outcome: Progressing Towards Goal 
Goal: *Prevention, detection, treatment of acute complications Description List symptoms of hyper- and hypoglycemia; describe how to treat low blood sugar and actions for lowering  high blood glucose level. Outcome: Progressing Towards Goal 
Goal: *Prevention, detection and treatment of chronic complications Description Define the natural course of diabetes and describe the relationship of blood glucose levels to long term complications of diabetes. Outcome: Progressing Towards Goal 
Goal: *Developing strategies to address psychosocial issues Description Describe feelings about living with diabetes; identify support needed and support network Outcome: Progressing Towards Goal 
Goal: *Insulin pump training Outcome: Progressing Towards Goal 
Goal: *Sick day guidelines Outcome: Progressing Towards Goal 
 Goal: *Patient Specific Goal (EDIT GOAL, INSERT TEXT) Outcome: Progressing Towards Goal 
  
Problem: Patient Education: Go to Patient Education Activity Goal: Patient/Family Education Outcome: Progressing Towards Goal 
  
Problem: Patient Education: Go to Patient Education Activity Goal: Patient/Family Education Outcome: Progressing Towards Goal 
  
Problem: Discharge Planning Goal: *Discharge to safe environment Outcome: Progressing Towards Goal 
  
Problem: Patient Education: Go to Patient Education Activity Goal: Patient/Family Education Outcome: Progressing Towards Goal 
  
Problem: Nutrition Deficit Goal: *Optimize nutritional status Outcome: Progressing Towards Goal 
  
Problem: Nutrition Deficit Goal: *Optimize nutritional status Outcome: Progressing Towards Goal 
  
Problem: Nutrition Deficit Goal: *Optimize nutritional status Outcome: Progressing Towards Goal 
  
Problem: Infection - Risk of, Urinary Catheter-Associated Urinary Tract Infection Goal: *Absence of infection signs and symptoms Outcome: Progressing Towards Goal 
  
Problem: Patient Education: Go to Patient Education Activity Goal: Patient/Family Education Outcome: Progressing Towards Goal

## 2019-11-13 NOTE — OP NOTES
[facility] 
OPERATIVE REPORT Name:  Dylan Navarrete 
MR#:  [id number] :  1950 ACCOUNT #:  [de-identified] DATE OF SERVICE:  2019 PREOPERATIVE DIAGNOSIS:  Gross hematuria. POSTOPERATIVE DIAGNOSIS/FINDINGS:  There were catheter related cystitis changes of the posterior wall of the bladder but I did not see any suggestion of tumor or anything worrisome. There was no active bleeding. PROCEDURE PERFORMED:  Cystoscopy. SURGEON:  Nicole Brooks MD 
 
ASSISTANT:  None. ANESTHESIA:  MAC. COMPLICATIONS:  None. SPECIMENS REMOVED:  None. IMPLANTS:  None. ESTIMATED BLOOD LOSS:  Minimal. 
 
DRAINS:  An 18-Greek Lizama catheter. INDICATIONS FOR PROCEDURE:  The patient is a 19-year-old gentleman with heart failure, who is being scheduled for placement of left ventricular assist device. He had significant gross hematuria earlier this hospitalization possibly related to urinary tract infection. The urine has cleared but cystoscopy was recommended to exclude any worrisome findings in the bladder to account for the hematuria and he preferred to do that under anesthesia in case any additional procedures were needed at the time. He was therefore transferred down from the CCU for the procedure. DESCRIPTION:  He was identified, as mentioned, escorted from the CCU into the cystoscopy room. Monitored anesthesia care was administered by the Anesthesia team, and he was then placed in dorsal lithotomy. His previous catheter was removed. He was then prepped and draped in standard fashion. With a 21-Greek sheath and both of 30 and 70-degree lens, a cystoscopy was performed. The anterior urethra was normal.  The prostate was short and small and did not appear obstructive, however, there was prominent vasculature throughout the prostate. The bladder was then examined closely with both lenses.   Isolated to the posterior wall at the site of the catheter was what appeared to be catheter related cystitis changes. I did not see anything that looked like tumor. The bladder was moderately trabeculated throughout and there were few small cellules. The trigone was normal.  Both ureteral orifices were normal.  I did not see anything that I felt warranted biopsy or any additional procedures. I removed the cystoscope and passed an 18-English Lizama catheter. He tolerated the procedure well. Findings were conveyed to his wife. Jermaine Gonzales MD 
 
 
[id number] 
D:  11/13/2019 8:22 T:  11/13/2019 10:38 
JOB #:  [id number]

## 2019-11-13 NOTE — ROUTINE PROCESS
Patient: Arianna Wynne MRN: [id number]  SSN: [id number] YOB: 1950  Age: 71 y.o. Sex: male Patient is status post Procedure(s): 
CYSTOSCOPY. Surgeon(s) and Role: Alon Cedillo MD - Primary PICC Double Lumen 14/41/95 Right;Basilic (Active) Central Line Being Utilized Yes 11/13/2019  4:00 AM  
Criteria for Appropriate Use Long term IV/antibiotic administration 11/13/2019  4:00 AM  
Site Assessment Clean, dry, & intact 11/13/2019  4:00 AM  
Phlebitis Assessment 0 11/13/2019  4:00 AM  
Infiltration Assessment 0 11/13/2019  4:00 AM  
Arm Circumference (cm) 32.5 cm 6/25/2019  4:49 PM  
Date of Last Dressing Change 11/04/19 11/13/2019  4:00 AM  
Dressing Status Clean, dry, & intact 11/13/2019  4:00 AM  
Action Taken Open ports on tubing capped 11/13/2019  4:00 AM  
External Catheter Length (cm) 0 centimeters 11/8/2019 12:00 PM  
Dressing Type Disk with Chlorhexadine gluconate (CHG); Transparent 11/13/2019  4:00 AM  
Hub Color/Line Status Red; Infusing 11/13/2019  4:00 AM  
Positive Blood Return (Site #1) Yes 11/13/2019  4:00 AM  
Hub Color/Line Status Purple;Flushed 11/13/2019  4:00 AM  
Positive Blood Return (Site #2) Yes 11/13/2019  4:00 AM  
Alcohol Cap Used Yes 11/13/2019  4:00 AM

## 2019-11-13 NOTE — PROGRESS NOTES
Bedside shift change report given to [address] [place] [address] (oncoming nurse) by Annette Henry RN (offgoing nurse). Report included the following information SBAR, Kardex, MAR and Recent Results.

## 2019-11-13 NOTE — PROGRESS NOTES
[facility] ICU Progress Note Admit Date: 10/25/2019 POD: 12 Day Post-Op Procedure:  Procedure(s): RIGHT AXILLARY IMPELLA INSERTION Subjective:  
Pt seen with Dr. Danika Sen. Just returned from cystoscopy, for C this afternoon. Impella P8, D5 purge. Lizama in place Objective:  
Vitals: 
Blood pressure 95/78, pulse 72, temperature 98.3 °F (36.8 °C), resp. rate 20, height 6' 2\" (1.88 m), weight 194 lb 14.2 oz (88.4 kg), SpO2 98 %. Temp (24hrs), Av.2 °F (36.8 °C), Min:98 °F (36.7 °C), Max:98.3 °F (36.8 °C) Hemodynamics: 
 CO: CO (l/min): 8.3 l/min CI: CI (l/min/m2): 4 l/min/m2 CVP: CVP (mmHg): 8 mmHg (19) SVR: SVR (dyne*sec)/cm5: 781 (dyne*sec)/cm5 (19) PAP Systolic: PAP Systolic: 45 (46/47/33 1206) PAP Diastolic: PAP Diastolic: 19 (46/75/27 5163) PVR:   
 SV02: SVO2 (%): 51 % (19) SCV02: SCVO2 (%): 75 % (10/29/19 1900) EKG/Rhythm:   
Paced in the 90s Oxygen Therapy: 
Oxygen Therapy O2 Sat (%): 98 % (19) Pulse via Oximetry: 83 beats per minute (19) O2 Device: Nasal cannula (19) O2 Flow Rate (L/min): 2 l/min (19) FIO2 (%): 40 % (10/30/19 0846) CXR:  
CXR Results  (Last 48 hours)  
          
 19 0511  XR CHEST PORT Final result Impression:  IMPRESSION:  
No interval change. Narrative:  PORTABLE CHEST RADIOGRAPH/S: 2019 5:11 AM  
   
Clinical history: Postoperative heart INDICATION:   postop heart COMPARISON: 2019 FINDINGS:  
AP portable upright view of the chest demonstrates a stable  cardiopericardial  
silhouette. The lungs are adequately expanded. Minimal interstitial edema. No  
consolidation or pneumothorax. . Cardiac assist device is unchanged. Cardiac  
pacer unchanged. PICC line catheter unchanged. Mild interstitial edema and  
cardiomegaly. . Patient is on a cardiac monitor. 19 0512  XR CHEST PORT Final result Impression:  IMPRESSION:  
Stable examination. Narrative:  PORTABLE CHEST RADIOGRAPH/S: 2019 5:12 AM  
   
Clinical history: Postoperative heart INDICATION:   postop heart COMPARISON: 2019 FINDINGS:  
AP portable upright view of the chest demonstrates a stable  cardiopericardial  
silhouette. The lungs are adequately expanded. There is no edema, effusion,  
consolidation, or pneumothorax. Cardiac assist device is unchanged. Cardiac  
pacer unchanged. PICC line catheter unchanged. Mild interstitial edema and  
cardiomegaly. . Patient is on a cardiac monitor. Admission Weight: Last Weight Weight: 192 lb 10.9 oz (87.4 kg) Weight: 194 lb 14.2 oz (88.4 kg) Intake / Output / Drain: 
Current Shift:  0701 -  1900 In: 100 [I.V.:100] Out: - Last 24 hrs.:  
 
Intake/Output Summary (Last 24 hours) at 2019 3694 Last data filed at 2019 6160 Gross per 24 hour Intake 512 ml Output 530 ml Net -18 ml EXAM: 
General:  NAD. Up in the chair Lungs:   Diminished in the bases. Incision:  Impella dressing C/D/I Heart:  Regular rate and rhythm, S1, S2 normal, no murmur, click, rub or gallop. Abdomen:   Soft, non-tender. Bowel sounds hypoactive. No masses,  No organomegaly. Extremities:  +2 bilateral lower extremity pitting edema. PPP. Neurologic:  Gross motor and sensory apparatus intact. Labs:  
Recent Labs 19 
0424 19 
2208 WBC 3.8*  --   
HGB 8.5*  --   
HCT 27.5*  --   
*  --   
*  --   
K 3.8  --   
BUN 12  --   
CREA 1.31*  --   
GLU 87  --   
GLUCPOC  --  103* INR 1.1  -- Assessment:  
 
Active Problems: 
  Acute decompensated heart failure ([facility] [address] (10/25/2019) Plan/Recommendations/Medical Decision Makin. Acute on chronic systolic (CHF Class IV) S/P Impella: Has ICD.  LV EF 16-20%. No BB/ACE/ARB/AA until appropriate. Holding diuresis per AHFS. Trend proBNP, lactate, LDH. Strict I/Os. Daily weights. LVAD w/u in process - looking towards 11/18/19. On Ancef for ppx. Continue D5 purge due to hematuria/epistaxis 2. Thrombocytopenia: Platelets at 942B today. No asa. HIT negative, LOAN negative. On protonix. Heme following. 3. CAD S/p CABG 2010: Needs C today w/ Dr. Kary Melgar. Stopped asa d/t hematuria/thrombocytopenia, not on statin historically. No BB d/t lower BP, on hydralazine but may need to hold 4. PAF s/p DCCV 6/2019: Holding eliquis due to hematuria/epistaxis. On PO amio. 5. JUAN J on CKD stage IV: Monitor. Renal following. Diuretics PRN (currently holding) CVP >10. Keep neal. 6. Hx of urinary retention/BPH, hematuria: On flomax. Neal in place, CBI stopped since urine clear. Cystoscopy today 7. JOSE: qhs CPAP. 8. Hx of sternal wound infection requring sternectomy: Not a contraindication for future LVAD. Tagged WBC -- showed no abnormal tracer uptake. 9. Iron def anemia: s/p venofer. H&H stable. On Epogen. Cannot use doxy/octreotide d/t prolonged QTC. Prev +occult stool. EGD/colonoscopy 11/11/19 neg for any acute process/abnormality, only revealed diverticulitis 10. Vocal cord paralysis: Voice improving. Speech eval PRN. Advance diet as tolerated. 11. Constipation: Resolved. Continue pericolace, miralax (pt keeps refusing). Continue daily suppository PRN. 12. Serratia UTI/hematuria: completed Zosyn. Monitor 13. Mediastinal lymphadenopathy:  Per AHF, low threshold for Carcinoma per CT scans. Eventually needs PET scan. 14. Hyponatremia: Resolved-monitor 15. Acute Moderate protein-calorie malnutrition:  Pre-albumin 10/25 was 13.9. Repeat 19.4. Pt eating cardiac diet well. Continue marinol Dispo: Care and orders per AHFS. Remain in CCU. Plan for LVAD 11-18-19.  
 
Signed By: Chapin Moralez NP

## 2019-11-13 NOTE — PROGRESS NOTES
1930 received bedside report from [address] pt up in chair on assessment, Impella @ 42.5,  running without complication 2100 pt back to bed, tolerated activity well 
 
0100 pre- procedure CHG bath completed 0400 no change on re-assessment 
 
0500 2nd pre-procedure CHG bath completed 0730 TRANSFER - OUT REPORT: 
 
Verbal report given to  (name) on Billy Ramírez  being transferred to OR(unit) for ordered procedure Report consisted of patients Situation, Background, Assessment and  
Recommendations(SBAR). Information from the following report(s) SBAR, Kardex, ED Summary, OR Summary, Procedure Summary, Intake/Output, MAR, Recent Results and Cardiac Rhythm Paced was reviewed with the receiving nurse. Lines: PICC Double Lumen 06/00/66 Right;Basilic (Active) Central Line Being Utilized Yes 11/13/2019  4:00 AM  
Criteria for Appropriate Use Long term IV/antibiotic administration 11/13/2019  4:00 AM  
Site Assessment Clean, dry, & intact 11/13/2019  4:00 AM  
Phlebitis Assessment 0 11/13/2019  4:00 AM  
Infiltration Assessment 0 11/13/2019  4:00 AM  
Arm Circumference (cm) 32.5 cm 6/25/2019  4:49 PM  
Date of Last Dressing Change 11/04/19 11/13/2019  4:00 AM  
Dressing Status Clean, dry, & intact 11/13/2019  4:00 AM  
Action Taken Open ports on tubing capped 11/13/2019  4:00 AM  
External Catheter Length (cm) 0 centimeters 11/8/2019 12:00 PM  
Dressing Type Disk with Chlorhexadine gluconate (CHG); Transparent 11/13/2019  4:00 AM  
Hub Color/Line Status Red; Infusing 11/13/2019  4:00 AM  
Positive Blood Return (Site #1) Yes 11/13/2019  4:00 AM  
Hub Color/Line Status Purple;Flushed 11/13/2019  4:00 AM  
Positive Blood Return (Site #2) Yes 11/13/2019  4:00 AM  
Alcohol Cap Used Yes 11/13/2019  4:00 AM  
  
 
Opportunity for questions and clarification was provided. Patient transported with: 
 O2 @ 2 liters

## 2019-11-13 NOTE — PROGRESS NOTES
Cardiac Cath Lab Procedure Area Arrival Note: 
 
Rebecca Frost arrived to Cardiac Cath Lab, Procedure Area. Patient identifiers verified with NAME and DATE OF BIRTH. Procedure verified with patient. Consent forms verified. Allergies verified. Patient informed of procedure and plan of care. Questions answered with review. Patient voiced understanding of procedure and plan of care. Patient on cardiac monitor, non-invasive blood pressure, SPO2 monitor. On room air and placed on O2 @ 2 lpm via NC. Patient status doing well without problems. Patient is A&Ox 4. Patient reports no pain. Patient medicated during procedure with orders obtained and verified by Dr. Geovanna Islas. Refer to patients Cardiac Cath Lab PROCEDURE REPORT for vital signs, assessment, status, and response during procedure, printed at end of case. Printed report on chart or scanned into chart.

## 2019-11-13 NOTE — PROGRESS NOTES
Occupational Therapy Patient off the floor for surgery. Will continue to follow for OT services. Thank you, Jose Mota, OT

## 2019-11-13 NOTE — ANESTHESIA POSTPROCEDURE EVALUATION
Procedure(s): 
CYSTOSCOPY. 
 
MAC, general - backup Anesthesia Post Evaluation Patient location during evaluation: ICU Patient participation: complete - patient participated Level of consciousness: awake Pain management: adequate Airway patency: patent Anesthetic complications: no 
Cardiovascular status: hemodynamically stable Respiratory status: acceptable Hydration status: acceptable Comments: I have seen and evaluated the patient. The patient is ready for PACU discharge. [address], DO Vitals Value Taken Time BP 98/54 11/13/2019  8:30 AM  
Temp 36.8 °C (98.3 °F) 11/13/2019  8:30 AM  
Pulse 70 11/13/2019  8:40 AM  
Resp 18 11/13/2019  8:40 AM  
SpO2 97 % 11/13/2019  8:40 AM  
Vitals shown include unvalidated device data.

## 2019-11-13 NOTE — BRIEF OP NOTE
BRIEF OPERATIVE NOTE Date of Procedure: 11/13/2019 Preoperative Diagnosis: HEMATURIA Postoperative Diagnosis: HEMATURIA Procedure(s): 
CYSTOSCOPY Surgeon(s) and Role: Erasmo Gold MD - Primary Surgical Assistant: none Surgical Staff: 
Circ-1: Rosalina Arriaga RN 
Circ-Intern: Luzma Peña Scrub Tech-1: Angelina Son Event Time In Time Out Incision Start 11/13/2019 0805 Incision Close 11/13/2019 0809 Anesthesia: General  
Estimated Blood Loss: minimal 
Specimens: * No specimens in log * Findings: catheter related cystitis @ posterior wall Complications: none Implants: * No implants in log *  
 
[id number]

## 2019-11-13 NOTE — INTERDISCIPLINARY ROUNDS
IDR/SLIDR Summary Patient: Cornel Silverio MRN: [id number]    Age: 71 y.o. YOB: 1950 Room/Bed: [address] [place] [address] Admit Diagnosis: Acute decompensated heart failure (HCC) [I50.9]  Principal Diagnosis: <principal problem not specified>  
Goals: LVAD w/u Readmission: NO  Quality Measure: CHF VTE Prophylaxis: Mechanical 
Influenza Vaccine screening completed? YES Pneumococcal Vaccine screening completed? NO Mobility needs: Yes   Nutrition plan:Yes 
Consults:P.T, O.T. and Case Management Financial concerns:Yes  Escalated to CM? YES 
RRAT Score:    Interventions:H2H Testing due for pt today? YES 
LOS: 18 days Expected length of stay ? days Discharge plan: tbd   PCP: Rafael Davis MD 
Transportation needs: Yes Days before discharge:two or more days before discharge Discharge disposition: tbd Signed:  
 
James Gimenez 11/12/2019 
2:08 AM

## 2019-11-14 NOTE — PROGRESS NOTES
1930 received bedside report from [address] [place] [address] pt resting on assessment, groin site without hematoma or bleeding, Impella at 42.5 running without complication. Pt w/ nose bleed, helped lightly pack right nare in attempt to stop bleeding. 2100 CHG bath completed, pt tolerated well 
 
0000 removed packing from right nare, no signs of bleeding 
 
0400 no change on re-assessment 
 
0600 Impella Purge Flow down to 5.9, ordered alteplase 0730 Bedside shift change report given to Emeli Good (oncoming nurse) by Jordyn Vázquez RN (offgoing nurse). Report included the following information SBAR, Kardex, Procedure Summary, Intake/Output, MAR, Recent Results and Cardiac Rhythm Paced.

## 2019-11-14 NOTE — PROGRESS NOTES
[address] [facility] [place] [facility] [address], [place] Inpatient Progress Note Patient name: Otis Dyson Patient : 1950 Patient MRN: [id number] Attending MD: [place] MD 
Date of service: 19 CHIEF COMPLAINT: 
Cardiogenic shock HPI:  
Otis Dyson is a 71y.o. year old pleasant white male with a history of HTN, HLD, JOSE, CAD s/p cardiac arrest VFib s/p CABG () c/b sternal would infection and sternectomy, ischemic cardiomyopathy LVEF 15-20%, s/p ICD and with LBBB. He was admitted with acute on chronic systolic heart failure with massive volume overload > 20 lbs, in the setting of atrial fibrillation s/p failed DCCV and underwent DCCV and RHC on . S/p BiVICD on 19 with Dr. Carolyn Roldan. He was discharged home home on IV milrinone on 19. He has been followed closely by Dr. Jim Ruelas and the [facility]. Mr. Tommie Knowles was admitted for acute on chronic systolic heart failure. He underwent implantation of Impella 5.0 due to CS and has completed his LVAD evaluation. He meets criteria for implant of HM3 as DT. He was NYHA Class IV prior to implant of Impella 5.0, has LVEF < 15%, was intolerant of GDMT due to symptomatic hypotension and renal dysfunction. He remains dependent on temporary MCS support. RHC yesterday revealed compensated hemodynamics on Impella. His renal function has improved dramatically with improvement in his hemodynamics. He is not a suitable transplant candidate due to single kidney, sternectomy, debility, and frailty. He was reviewed by [facility] and felt to be a high risk heart transplant candidate due to multiple co-morbidities as well as the afore mentioned conditions. Interval history: 
Denies dyspnea Feels well Impression/Plan: 
Chronic systolic heart failure due to ICM, NYHA Class IV, cardiogenic shock on Impella 5.0 support Continue current impella speed at P8; cannot tolerate lower speeds Impella dislodged and repositioned, continue cefazolin indefinitely due to risk of infection No AC due to hematuria, epistaxis [address] 11/13 shows excellent CI, normal filling pressures Goal CVP 10-12 mmHg - transduce CVP via PICC Bumex 1 mg IV daily Continue CAROL hose to mobilize LE fluid No ACE/ARB/ARNi in anticipation of surgery No AA due to hyponatremia and upcoming surgery LVAD evaluation complete - Begin Vitamin K daily x 3 on 11/15 
 - Consents to be signed over the weekend - [employer] rep notified of implant date CKD 3, atrophic left kidney Appreciate Nephrology assistance Cr markedly improved with Impella support Continue current support at P-8 Bumex to 1 mg IV daily Avoid nephrotoxins Trend labs CAD s/p CABG Intolerant of ASA due to thrombocytopenia No BB due to symptomatic hypotension Hold statin due to recent hepatic failure LH performed 11/13 - low likelihood of benefit from revascularization Hx of VF arrest s/p AICD No recent shocks Keep K > 4 and Mg > 2 Hypertension, goal SBP < 120 mmHg Hydralazine 10 mg PO TID  
 
PAF Currently in NSR Intolerant to [facility] due to hematuria Continue amiodarone 100 mg PO daily Urinary retention, c/b serratia UTI and hematuria Appreciate Urology consult Cystoscopy performed 11/13 shows catheter induced cystitis Hold AC for now Malnutrition Appreciate Nutritionist consult Prealbumin improved to 19 Continue Marinol 2.5 mg PO BID and six small meals daily COPD Appreciate Pulmonology assistance Continue nebs PRN On RA  
PFTs complete - unable to perform DLCO due to Impella Anemia Multifactorial, due to hemolysis + epistaxis + hematuria Intolerant of octreotide or doxycycline for AVM ppx due to prolonged QTc Diverticulosis Noted on colonoscopy 11/11 Monitor Hx of sternal wound infection, sternectomy Dr. Greg Mendoza has reviewed CTs, has surgical plan in place Vocal cord paralysis Improved Debility Continue PT/OT Appreciate assistance Ppx Protonix SCDs PT/OT  
PICC placed 6/25/19 Thank you for letting us see him with you, Mounika Jain MD, Анна Cota Chief of Cardiology, [facility] Medical Director Calos Rueda [address] [place] [address], [address] Office [phone] Fax [phone] CARDIAC IMAGING: 
Tagged WBC negative 
  INTERVAL HISTORY: 
Cystoscopy, LHC complete Cr trending up slightly Denies complaints PHYSICAL EXAM: 
Visit Vitals /82 Pulse 76 Temp 98.9 °F (37.2 °C) Resp 16 Ht 6' 2\" (1.88 m) Wt 195 lb 12.3 oz (88.8 kg) SpO2 95% BMI 25.14 kg/m² Impella (5.0) Performance Level (P Level): 8 Flow Rate L/min (0-2.5): 4.1 L/min Placement Signal (mmHg): Differential 5.0 (s/d 30-60/0 ex) Placement Signal (Systolic/Diastolic): 25/1 Motor Current (amp): (normal) Motor Current (Systolic/Diastolic): 430/740 Placement Monitoring: OK Purge Pressure (300-1100 mm Hg): 600 Purge Flow (ml/hr): 6.3 Sidearm Pressure Bag @ 300 mmHg/ flushed (Na with 5.0 Impella): Yes Power (AC/Battery): Yes Impella Pump Serial Number: [id number] Hemodynamics: 
 CVP: CVP (mmHg): 10 mmHg (11/14/19 1500) Review of Systems Constitutional: Negative for chills, fever and malaise/fatigue. HENT: Positive for hearing loss. Respiratory: Negative for cough and shortness of breath. Cardiovascular: Negative for chest pain, palpitations, leg swelling and PND. Gastrointestinal: Negative for heartburn, nausea and vomiting. Appetite greatly improved Musculoskeletal: Negative. Neurological: Negative for dizziness, focal weakness and headaches. Psychiatric/Behavioral: Negative. Physical Exam  
Constitutional: He is oriented to person, place, and time and well-developed, well-nourished, and in no distress. No distress. HENT:  
Head: Normocephalic. Neck: Normal range of motion. Neck supple. No JVD present. Cardiovascular: Normal rate, regular rhythm, normal heart sounds and intact distal pulses. Pulmonary/Chest: Effort normal and breath sounds normal. No respiratory distress. Abdominal: Soft. Bowel sounds are normal. He exhibits no distension. Musculoskeletal: Normal range of motion. He exhibits no edema. Neurological: He is alert and oriented to person, place, and time. Skin: Skin is warm and dry. No erythema. Psychiatric: Affect and judgment normal.  
Nursing note and vitals reviewed. PAST MEDICAL HISTORY: 
Past Medical History:  
Diagnosis Date  Degenerative disc disease, lumbar  Heart failure ([facility]  High cholesterol  Hypertension  Paroxysmal atrial fibrillation ([facility] 4/2/2019  Spinal stenosis PAST SURGICAL HISTORY: 
Past Surgical History:  
Procedure Laterality Date  COLONOSCOPY Left 11/11/2019 COLONOSCOPY at bedside performed by Ana Whitaker MD at [address]  HX CORONARY ARTERY BYPASS GRAFT    
 triple  HX HERNIA REPAIR    
 HX IMPLANTABLE CARDIOVERTER DEFIBRILLATOR  NH CARDIOVERSION ELECTIVE ARRHYTHMIA EXTERNAL N/A 6/10/2019 EP CARDIOVERSION performed by Jennifer Fisher MD at [place] [address] CATH LAB  NH CARDIOVERSION ELECTIVE ARRHYTHMIA EXTERNAL N/A 6/18/2019 EP CARDIOVERSION performed by Ashley Fowler MD at [place] [address], [address] CATH LAB  NH INSJ/RPLCMT PERM DFB W/TRNSVNS LDS 1/DUAL CHMBR N/A 6/21/2019 INSERT ICD BIV MULTI performed by Marissa Matos MD at [place] [address], [address] CATH LAB  NH TCAT IMPL WRLS P-ART PRS SNR L-T HEMODYN MNTR N/A 9/18/2019 IMPLANT HEART FAILURE MONITORING DEVICE performed by Liv Piedra MD at [place] [address], [address] CATH LAB FAMILY HISTORY: 
Family History Problem Relation Age of Onset  Lung Disease Mother  Hypertension Mother [facility] Arthritis-osteo Mother  Heart Disease Mother  Heart Disease Father  Diabetes Father SOCIAL HISTORY: 
Social History Socioeconomic History  Marital status:  Spouse name: Not on file  Number of children: Not on file  Years of education: Not on file  Highest education level: Not on file Tobacco Use  Smoking status: Former Smoker Last attempt to quit: 2010 Years since quittin.9  Smokeless tobacco: Never Used Substance and Sexual Activity  Alcohol use: Not Currently Comment: rarely  Drug use: Never Other Topics Concern LABORATORY RESULTS: 
  
Labs Latest Ref Rng & Units 2019 2019 2019 2019 11/10/2019 2019 2019 WBC 4.1 - 11.1 K/uL 3.7(L) 3.8(L) 3.8(L) 4.4 4.8 5.0 4.9  
RBC 4.10 - 5.70 M/uL 2.69(L) 2.86(L) 2.95(L) 2.86(L) 2.89(L) 2.82(L) 2.89(L) Hemoglobin 12.1 - 17.0 g/dL 8. 0(L) 8.5(L) 8.7(L) 8.5(L) 8.6(L) 8.2(L) 8.5(L) Hematocrit 36.6 - 50.3 % 26. 7(L) 27. 5(L) 28. 1(L) 27. 5(L) 28. 0(L) 27. 0(L) 27. 7(L) MCV 80.0 - 99.0 FL 99. 3(H) 96.2 95.3 96.2 96.9 95.7 95.8 Platelets 999 - 367 K/uL 82(L) 105(L) 111(L) 93(L) 105(L) 98(L) 97(L) Lymphocytes 12 - 49 % - - - - - - - Monocytes 5 - 13 % - - - - - - - Eosinophils 0 - 7 % - - - - - - - Basophils 0 - 1 % - - - - - - - Albumin 3.5 - 5.0 g/dL 2. 6(L) 2. 3(L) 2. 4(L) 2. 2(L) 2. 3(L) 2. 3(L) 2. 4(L) Calcium 8.5 - 10.1 MG/DL 8.5 8.6 8.9 8.6 8.5 8.6 8.7 SGOT 15 - 37 U/L 22 25 27 28 29 35 36 Glucose 65 - 100 mg/dL 84 87 86 76 89 79 85 BUN 6 - 20 MG/DL 12 12 13 13 16 15 17 Creatinine 0.70 - 1.30 MG/DL 1.28 1.31(H) 1.25 0.97 1.25 1.13 1.25 Sodium 136 - 145 mmol/L 134(L) 133(L) 137 136 134(L) 135(L) 136 Potassium 3.5 - 5.1 mmol/L 3.9 3.8 3.5 3. 3(L) 4.0 4.0 4.1 TSH 0.36 - 3.74 uIU/mL - - - - - - -  
PSA 0.01 - 4.0 ng/mL - - - - - - -  
LDH 85 - 241 U/L 410(H) 437(H) 516(H) 422(H) 435(H) 438(H) 439(H) CEA ng/mL - - - - - - 6.6 Some recent data might be hidden Lab Results Component Value Date/Time TSH 2.40 10/25/2019 07:39 PM  
 TSH 2.45 06/01/2019 04:16 AM  
 
 
ALLERGY: 
No Known Allergies CURRENT MEDICATIONS: 
Current Facility-Administered Medications Medication Dose Route Frequency  sodium chloride (NS) flush 5-40 mL  5-40 mL IntraVENous Q8H  
 sodium chloride (NS) flush 5-40 mL  5-40 mL IntraVENous PRN  
 naloxone (NARCAN) injection 0.4 mg  0.4 mg IntraVENous PRN  
 diphenhydrAMINE (BENADRYL) injection 25 mg  25 mg IntraVENous Q4H PRN  
 oxymetazoline (AFRIN) 0.05 % nasal spray 2 Spray  2 Spray Right Nostril BID PRN  
 epoetin yvonne-epbx (RETACRIT) injection 20,000 Units  20,000 Units SubCUTAneous Q TUE, THU & SAT  bumetanide (BUMEX) injection 1 mg  1 mg IntraVENous Q6H PRN  
 lactobac ac& pc-s.therm-b.anim (TIAN Q/RISAQUAD)  1 Cap Oral DAILY  sodium chloride (NS) flush 5-40 mL  5-40 mL IntraVENous PRN  
 simethicone (MYLICON) 44RB/8.9FM oral drops 80 mg  1.2 mL Oral Multiple  ceFAZolin (ANCEF) 2 g/20 mL in sterile water IV syringe  2 g IntraVENous Q8H  
 benzocaine-menthol (CEPACOL) lozenge 1 Lozenge  1 Lozenge Mucous Membrane PRN  
 hydrALAZINE (APRESOLINE) tablet 10 mg  10 mg Oral TID  
 0.9% sodium chloride infusion 250 mL  250 mL IntraVENous PRN  
 amiodarone (CORDARONE) tablet 100 mg  100 mg Oral DAILY  dronabinol (MARINOL) capsule 2.5 mg  2.5 mg Oral ACB&D  
 insulin lispro (HUMALOG) injection   SubCUTAneous AC&HS  sodium chloride (OCEAN) 0.65 % nasal squeeze bottle 2 Spray  2 Spray Both Nostrils QID  alteplase (CATHFLO) 1 mg in dextrose 5% 50 mL impella purge solution  1 mg Other TITRATE  pantoprazole (PROTONIX) tablet 40 mg  40 mg Oral ACB  dextrose 5% infusion  4-20 mL/hr IntraVENous CONTINUOUS  
 [Held by provider] bivalirudin (ANGIOMAX) 250 mg in dextrose 5% 250 mL infusion  4-20 mL/hr Other TITRATE  alteplase (CATHFLO) 1 mg in sterile water (preservative free) 1 mL injection  1 mg InterCATHeter PRN  
  bacitracin 500 unit/gram packet 1 Packet  1 Packet Topical PRN  
 sodium chloride (NS) flush 5-40 mL  5-40 mL IntraVENous Q8H  
 sodium chloride (NS) flush 5-40 mL  5-40 mL IntraVENous PRN  
 0.45% sodium chloride infusion  10 mL/hr IntraVENous CONTINUOUS  
 0.9% sodium chloride infusion  10 mL/hr IntraVENous CONTINUOUS  
 oxyCODONE IR (ROXICODONE) tablet 5 mg  5 mg Oral Q4H PRN  
 oxyCODONE IR (ROXICODONE) tablet 10 mg  10 mg Oral Q4H PRN  
 morphine injection 4 mg  4 mg IntraVENous Q2H PRN  
 naloxone (NARCAN) injection 0.4 mg  0.4 mg IntraVENous PRN  
 ondansetron (ZOFRAN) injection 4 mg  4 mg IntraVENous Q4H PRN  
 albuterol (PROVENTIL VENTOLIN) nebulizer solution 2.5 mg  2.5 mg Nebulization Q4H PRN  chlorhexidine (PERIDEX) 0.12 % mouthwash 10 mL  10 mL Oral Q12H  
 magnesium oxide (MAG-OX) tablet 400 mg  400 mg Oral BID  calcium chloride 1 g in 0.9% sodium chloride 250 mL IVPB  1 g IntraVENous PRN  
 bisacodyl (DULCOLAX) suppository 10 mg  10 mg Rectal DAILY PRN  
 senna-docusate (PERICOLACE) 8.6-50 mg per tablet 1 Tab  1 Tab Oral BID  polyethylene glycol (MIRALAX) packet 17 g  17 g Oral DAILY  ELECTROLYTE REPLACEMENT NOTE: Nurse to review Serum Potassium and Magnesuim levels and Initiate Electrolyte Replacement Protocol as needed  1 Each Other PRN  
 magnesium sulfate 1 g/100 ml IVPB (premix or compounded)  1 g IntraVENous PRN  
 glucose chewable tablet 16 g  4 Tab Oral PRN  
 glucagon (GLUCAGEN) injection 1 mg  1 mg IntraMUSCular PRN  
 dextrose 10% infusion 0-250 mL  0-250 mL IntraVENous PRN  
 morphine injection 2 mg  2 mg IntraVENous Q2H PRN  
 melatonin tablet 3 mg  3 mg Oral QHS PRN  
 albumin human 5% (BUMINATE) solution 25 g  25 g IntraVENous Q2H PRN  
 influenza vaccine 2019-20 (6 mos+)(PF) (FLUARIX/FLULAVAL/FLUZONE QUAD) injection 0.5 mL  0.5 mL IntraMUSCular PRIOR TO DISCHARGE  
 [Held by provider] aspirin delayed-release tablet 81 mg  81 mg Oral DAILY  tamsulosin (FLOMAX) capsule 0.8 mg  0.8 mg Oral DAILY  allopurinol (ZYLOPRIM) tablet 100 mg  100 mg Oral DAILY Procedures LEFT AND RIGHT HEART CATH / CORONARY ANGIOGRAPHY Pre-procedure Diagnosis [facility] [W16.934] Indications LVAD (left ventricular assist device) present [facility] [B55.959 (ICD-10-CM)] Link to printable coronary/vascular diagram report Coronary/Vascular Diagrams Phase: Baseline Data Systolic Diastolic Mean dP/dt A Wave V Wave RV Pressures 49 mmHg 5 mmHg 485 mmHg/sec PA Pressures 49 mmHg 15 mmHg 29 mmHg LV Pressures 96 mmHg 12 mmHg 824 mmHg/sec AO Pressures 87 mmHg 68 mmHg 76 mmHg RA Pressures   5 mmHg 9 mmHg 7 mmHg PCW Pressures   18 mmHg 16 mmHg 33 mmHg Phase: Baseline Data HR TDCO TDCI Brad CI PVR/SVR TPR/TVR Cardiac Output Results    3.44 L/min/m2 Resistance Results     0.15  
 0.38 Blood Oximetry PA O2 Sat  
59 % Sedation Time Moderate conscious sedation of 1 hour 7 minutes 19 seconds was performed by an independent provider giving the medication per my discretion and monitoring the vital signs throughout the case. Thank you for allowing me to participate in this patient's care. MD Calos Garcia [address], [address] Phone: [phone] Fax: [phone]

## 2019-11-14 NOTE — PROGRESS NOTES
Urology Progress Note Patient: Milagro Holley MRN: [id number]  SSN: [id number] YOB: 1950  Age: 71 y.o. Sex: male ADMITTED: 10/25/2019 to Beth Gee MD for [facility] [I50.9] POD# 1 Day Post-Op Procedure(s): LEFT AND RIGHT HEART CATH / CORONARY ANGIOGRAPHY Sleeping comfortably. Urine remains clear. Vitals: Temp (24hrs), Av.1 °F (36.7 °C), Min:98 °F (36.7 °C), Max:98.3 °F (36.8 °C) Blood pressure 93/65, pulse 92, temperature 98 °F (36.7 °C), resp. rate 23, height 6' 2\" (1.88 m), weight 88.8 kg (195 lb 12.3 oz), SpO2 94 %. Intake and Output: 
 1901 -  0700 In: 562 [I.V.:562] Out: 1850 [[id number]] Labs: 
Labs:  
Lab Results Component Value Date/Time WBC 3.7 (L) 2019 04:25 AM  
 HGB 8.0 (L) 2019 04:25 AM  
 Creatinine 1.28 2019 04:25 AM  
 
 
 
Assessment/Plan: No worrisome findings on cystoscopy yesterday. Urine remains clear. Catheter can be removed at any time from my standpoint. Recall PRN. Signed By: Jose Cevallos MD - 2019

## 2019-11-14 NOTE — PROGRESS NOTES
[address] [place] [address] [address] in [place], [place] Inpatient Progress Note Patient name: Cornel Silverio Patient : 1950 Patient MRN: [id number] Attending MD: Fred Titus MD 
Date of service: 19 CHIEF COMPLAINT: 
Cardiogenic shock HPI:  
Cornel Silverio is a 71y.o. year old pleasant white male with a history of HTN, HLD, JOSE, CAD s/p cardiac arrest VFib s/p CABG () c/b sternal would infection and sternectomy, ischemic cardiomyopathy LVEF 15-20%, s/p ICD and with LBBB. He was admitted with acute on chronic systolic heart failure with massive volume overload > 20 lbs, in the setting of atrial fibrillation s/p failed DCCV and underwent DCCV and RHC on . S/p BiVICD on 19 with Dr. Lizette Alvarado. He was discharged home home on IV milrinone on 19. He has been followed closely by Dr. Elen Shah and the [facility]. Mr. Sabina Gardner remained progressively more dyspneic with worsening renal function which prompted his elective admission to [facility] for LVAD evaluation and implant. He remains in the CCU undergoing completion of his LVAD evaluation with tentative implant date of  with  3 as DT.  
  
Impression/Plan: NYHA Class IV, cardiogenic shock, Impella 5.0 Chronic systolic heart failure due to ICM, NYHA Class IV, cardiogenic shock on Impella 5.0 support Continue current impella speed at P8; cannot tolerate lower speeds Impella dislodged and repositioned, continue cefazolin indefinitely due to risk of infection No AC due to hematuria, epistaxis [address]  shows excellent CI, normal filling pressures Goal CVP 10-12 mmHg - transduce CVP via PICC Bumex 1 mg IV q6h PRN CVP > 12 mmHg Continue CAROL hose to mobilize LE fluid No ACE/ARB/ARNi in anticipation of surgery No AA due to hyponatremia and upcoming surgery LVAD evaluation complete - Place lines Thursday,  - Begin Vitamin K daily x 3 on 11/15 - Hematologic optimization over the weekend per Dr. Oswaldo Cardoso pre-op note - Consents to be signed over the weekend - [employer] rep notified of implant date CKD 3, atrophic left kidney Appreciate Nephrology assistance Cr markedly improved with Impella support Continue current support at P-8 Bumex to 1 mg IV PRN CVP > 12 Avoid IVVD prior to [facility] Avoid nephrotoxins Trend labs CAD s/p CABG Intolerant of ASA due to thrombocytopenia No BB due to hypotension Hold statin due to recent hepatic failure LHC performed 11/13 - low likelihood of benefit from revascularization Hx of VF arrest s/p AICD No recent shocks Keep K > 4 and Mg > 2 Hypertension, goal SBP < 120 mmHg Hydralazine 10 mg PO TID  
 
PAF Currently in NSR Intolerant to [facility] [place], operated by [facility] due to hematuria Continue amiodarone 100 mg PO daily Urinary retention, c/b serratia UTI and hematuria Appreciate Urology consult Cystoscopy performed 11/13 shows catheter induced cystitis Hold AC for now Malnutrition Appreciate Nutritionist consult Prealbumin improved to 19 Continue Marinol 2.5 mg PO BID and six small meals daily COPD Appreciate Pulmonology assistance Continue nebs PRN On RA  
PFTs complete - unable to perform DLCO due to Impella Anemia Multifactorial, due to hemolysis + epistaxis + hematuria Intolerant of octreotide or doxycycline for AVM ppx due to prolonged QTc Repeat 12 lead EKG in AM  
 
Diverticulosis Noted on colonoscopy 11/11 Monitor Hx of sternal wound infection, sternectomy Dr. Rin Fraga has reviewed CTs, has surgical plan in place Vocal cord paralysis Improved Debility Continue PT/OT Appreciate assistance Ppx Protonix SCDs PT/OT  
PICC placed 6/25/19 CARDIAC IMAGING: 
Tagged WBC negative 
  INTERVAL HISTORY: 
Cystoscopy, LHC complete Cr trending up slightly Denies complaints Patient seen and examined. Data and note reviewed.  I have discussed and agree with the plans as noted. 71year old male with a history of CAD, s/p CAG, ICM who was admitted with CS and underwent Impella 5.0. He is completing LVAD evaluation for DT. EGD and colonoscopy noted for diverticulosis. Cystoscopy notable for catheter induced cystitis. Patent grafts and compensated hemodynamics noted on heart cath. Thank you for allowing us to participate in your patient's care. Mounika Regalado MD, Virginie Rater Chief of Cardiology, [facility] Medical Director Calos Portillodo [address], [address] [address] [address] Office [phone] Fax [phone] PHYSICAL EXAM: 
Visit Vitals BP (!) 84/67 Pulse 70 Temp 98 °F (36.7 °C) Resp 17 Ht 6' 2\" (1.88 m) Wt 194 lb 14.2 oz (88.4 kg) SpO2 91% BMI 25.02 kg/m² Impella (5.0) Performance Level (P Level): 8 Flow Rate L/min (0-2.5): 4.3 L/min Placement Signal (mmHg): Differential 5.0 (s/d 30-60/0 ex) Placement Signal (Systolic/Diastolic): 08/1 Motor Current (amp): (normal) Motor Current (Systolic/Diastolic): 705/106 Placement Monitoring: OK Purge Pressure (300-1100 mm Hg): 442 Purge Flow (ml/hr): 14 Sidearm Pressure Bag @ 300 mmHg/ flushed (Na with 5.0 Impella): Yes Power (AC/Battery): Yes Impella Pump Serial Number: [id number] Hemodynamics: 
 CVP: CVP (mmHg): 8 mmHg (11/13/19 0845) Review of Systems Constitutional: Negative for chills, fever and malaise/fatigue. HENT: Positive for hearing loss. Respiratory: Negative for cough and shortness of breath. Cardiovascular: Negative for chest pain, palpitations, leg swelling and PND. Gastrointestinal: Negative for heartburn, nausea and vomiting. Appetite greatly improved Musculoskeletal: Negative. Neurological: Negative for dizziness, focal weakness and headaches. Psychiatric/Behavioral: Negative.    
 
 
Physical Exam  
 Constitutional: He is oriented to person, place, and time and well-developed, well-nourished, and in no distress. No distress. HENT:  
Head: Normocephalic. Neck: Normal range of motion. Neck supple. No JVD present. Cardiovascular: Normal rate, regular rhythm, normal heart sounds and intact distal pulses. Pulmonary/Chest: Effort normal and breath sounds normal. No respiratory distress. Abdominal: Soft. Bowel sounds are normal. He exhibits no distension. Musculoskeletal: Normal range of motion. He exhibits no edema. Neurological: He is alert and oriented to person, place, and time. Skin: Skin is warm and dry. No erythema. Psychiatric: Affect and judgment normal.  
Nursing note and vitals reviewed. PAST MEDICAL HISTORY: 
Past Medical History:  
Diagnosis Date  Degenerative disc disease, lumbar  Heart failure ([facility]  High cholesterol  Hypertension  Paroxysmal atrial fibrillation ([facility] 4/2/2019  Spinal stenosis PAST SURGICAL HISTORY: 
Past Surgical History:  
Procedure Laterality Date  COLONOSCOPY Left 11/11/2019 COLONOSCOPY at bedside performed by Sid Ray MD at [address]  HX CORONARY ARTERY BYPASS GRAFT    
 triple  HX HERNIA REPAIR    
 HX IMPLANTABLE CARDIOVERTER DEFIBRILLATOR  MI CARDIOVERSION ELECTIVE ARRHYTHMIA EXTERNAL N/A 6/10/2019 EP CARDIOVERSION performed by Louann Valdivia MD at [place] [address], [facility] CATH LAB  MI CARDIOVERSION ELECTIVE ARRHYTHMIA EXTERNAL N/A 6/18/2019 EP CARDIOVERSION performed by Bossman Reid MD at [place] [address], [facility] [address] CATH LAB  MI INSJ/RPLCMT PERM DFB W/TRNSVNS LDS 1/DUAL CHMBR N/A 6/21/2019 INSERT ICD BIV MULTI performed by Marii Rey MD at [place] [address], [facility] [address] CATH LAB  MI TCAT IMPL WRLS P-ART PRS SNR L-T HEMODYN MNTR N/A 9/18/2019 IMPLANT HEART FAILURE MONITORING DEVICE performed by Aurora Lucio MD at [place] [address], [facility] CATH LAB FAMILY HISTORY: 
Family History Problem Relation Age of Onset  Lung Disease Mother  Hypertension Mother [facility] Arthritis-osteo Mother  Heart Disease Mother  Heart Disease Father  Diabetes Father SOCIAL HISTORY: 
Social History Socioeconomic History  Marital status:  Spouse name: Not on file  Number of children: Not on file  Years of education: Not on file  Highest education level: Not on file Tobacco Use  Smoking status: Former Smoker Last attempt to quit: 2010 Years since quittin.9  Smokeless tobacco: Never Used Substance and Sexual Activity  Alcohol use: Not Currently Comment: rarely  Drug use: Never Other Topics Concern LABORATORY RESULTS: 
  
Labs Latest Ref Rng & Units 2019 2019 2019 11/10/2019 2019 2019 2019 WBC 4.1 - 11.1 K/uL 3.8(L) 3.8(L) 4.4 4.8 5.0 4.9 -  
RBC 4.10 - 5.70 M/uL 2.86(L) 2.95(L) 2.86(L) 2.89(L) 2.82(L) 2.89(L) - Hemoglobin 12.1 - 17.0 g/dL 8.5(L) 8.7(L) 8.5(L) 8.6(L) 8.2(L) 8.5(L) 8.5(L) Hematocrit 36.6 - 50.3 % 27. 5(L) 28. 1(L) 27. 5(L) 28. 0(L) 27. 0(L) 27. 7(L) 27. 9(L) MCV 80.0 - 99.0 FL 96.2 95.3 96.2 96.9 95.7 95.8 - Platelets 962 - 996 K/uL 105(L) 111(L) 93(L) 105(L) 98(L) 97(L) - Lymphocytes 12 - 49 % - - - - - - - Monocytes 5 - 13 % - - - - - - - Eosinophils 0 - 7 % - - - - - - - Basophils 0 - 1 % - - - - - - - Albumin 3.5 - 5.0 g/dL 2. 3(L) 2. 4(L) 2. 2(L) 2. 3(L) 2. 3(L) 2. 4(L) -  
Calcium 8.5 - 10.1 MG/DL 8.6 8.9 8.6 8.5 8.6 8.7 -  
SGOT 15 - 37 U/L 25 27 28 29 35 36 - Glucose 65 - 100 mg/dL 87 86 76 89 79 85 -  
BUN 6 - 20 MG/DL 12 13 13 16 15 17 - Creatinine 0.70 - 1.30 MG/DL 1.31(H) 1.25 0.97 1.25 1.13 1.25 - Sodium 136 - 145 mmol/L 133(L) 137 136 134(L) 135(L) 136 - Potassium 3.5 - 5.1 mmol/L 3.8 3.5 3. 3(L) 4.0 4.0 4.1 -  
TSH 0.36 - 3.74 uIU/mL - - - - - - -  
PSA 0.01 - 4.0 ng/mL - - - - - - -  
LDH 85 - 241 U/L 437(H) 516(H) 422(H) 435(H) 438(H) 439(H) -  
 CEA ng/mL - - - - - 6.6 - Some recent data might be hidden Lab Results Component Value Date/Time TSH 2.40 10/25/2019 07:39 PM  
 TSH 2.45 06/01/2019 04:16 AM  
 
 
ALLERGY: 
No Known Allergies CURRENT MEDICATIONS: 
Current Facility-Administered Medications Medication Dose Route Frequency  sodium chloride (NS) flush 5-40 mL  5-40 mL IntraVENous Q8H  
 sodium chloride (NS) flush 5-40 mL  5-40 mL IntraVENous PRN  
 naloxone (NARCAN) injection 0.4 mg  0.4 mg IntraVENous PRN  
 diphenhydrAMINE (BENADRYL) injection 25 mg  25 mg IntraVENous Q4H PRN  
 oxymetazoline (AFRIN) 0.05 % nasal spray 2 Spray  2 Spray Right Nostril BID PRN  
 epoetin yvonne-epbx (RETACRIT) injection 20,000 Units  20,000 Units SubCUTAneous Q TUE, THU & SAT  bumetanide (BUMEX) injection 1 mg  1 mg IntraVENous Q6H PRN  
 lactobac ac& pc-s.therm-b.anim (TIAN Q/RISAQUAD)  1 Cap Oral DAILY  sodium chloride (NS) flush 5-40 mL  5-40 mL IntraVENous PRN  
 simethicone (MYLICON) 28NE/9.4NJ oral drops 80 mg  1.2 mL Oral Multiple  ceFAZolin (ANCEF) 2 g/20 mL in sterile water IV syringe  2 g IntraVENous Q8H  
 benzocaine-menthol (CEPACOL) lozenge 1 Lozenge  1 Lozenge Mucous Membrane PRN  
 hydrALAZINE (APRESOLINE) tablet 10 mg  10 mg Oral TID  
 0.9% sodium chloride infusion 250 mL  250 mL IntraVENous PRN  
 amiodarone (CORDARONE) tablet 100 mg  100 mg Oral DAILY  dronabinol (MARINOL) capsule 2.5 mg  2.5 mg Oral ACB&D  
 insulin lispro (HUMALOG) injection   SubCUTAneous AC&HS  sodium chloride (OCEAN) 0.65 % nasal squeeze bottle 2 Spray  2 Spray Both Nostrils QID  alteplase (CATHFLO) 1 mg in dextrose 5% 50 mL impella purge solution  1 mg Other TITRATE  pantoprazole (PROTONIX) tablet 40 mg  40 mg Oral ACB  dextrose 5% infusion  4-20 mL/hr IntraVENous CONTINUOUS  
 [Held by provider] bivalirudin (ANGIOMAX) 250 mg in dextrose 5% 250 mL infusion  4-20 mL/hr Other TITRATE  alteplase (CATHFLO) 1 mg in sterile water (preservative free) 1 mL injection  1 mg InterCATHeter PRN  
 bacitracin 500 unit/gram packet 1 Packet  1 Packet Topical PRN  
 sodium chloride (NS) flush 5-40 mL  5-40 mL IntraVENous Q8H  
 sodium chloride (NS) flush 5-40 mL  5-40 mL IntraVENous PRN  
 0.45% sodium chloride infusion  10 mL/hr IntraVENous CONTINUOUS  
 0.9% sodium chloride infusion  10 mL/hr IntraVENous CONTINUOUS  
 oxyCODONE IR (ROXICODONE) tablet 5 mg  5 mg Oral Q4H PRN  
 oxyCODONE IR (ROXICODONE) tablet 10 mg  10 mg Oral Q4H PRN  
 morphine injection 4 mg  4 mg IntraVENous Q2H PRN  
 naloxone (NARCAN) injection 0.4 mg  0.4 mg IntraVENous PRN  
 ondansetron (ZOFRAN) injection 4 mg  4 mg IntraVENous Q4H PRN  
 albuterol (PROVENTIL VENTOLIN) nebulizer solution 2.5 mg  2.5 mg Nebulization Q4H PRN  chlorhexidine (PERIDEX) 0.12 % mouthwash 10 mL  10 mL Oral Q12H  
 magnesium oxide (MAG-OX) tablet 400 mg  400 mg Oral BID  calcium chloride 1 g in 0.9% sodium chloride 250 mL IVPB  1 g IntraVENous PRN  
 bisacodyl (DULCOLAX) suppository 10 mg  10 mg Rectal DAILY PRN  
 senna-docusate (PERICOLACE) 8.6-50 mg per tablet 1 Tab  1 Tab Oral BID  polyethylene glycol (MIRALAX) packet 17 g  17 g Oral DAILY  ELECTROLYTE REPLACEMENT NOTE: Nurse to review Serum Potassium and Magnesuim levels and Initiate Electrolyte Replacement Protocol as needed  1 Each Other PRN  
 magnesium sulfate 1 g/100 ml IVPB (premix or compounded)  1 g IntraVENous PRN  
 glucose chewable tablet 16 g  4 Tab Oral PRN  
 glucagon (GLUCAGEN) injection 1 mg  1 mg IntraMUSCular PRN  
 dextrose 10% infusion 0-250 mL  0-250 mL IntraVENous PRN  
 morphine injection 2 mg  2 mg IntraVENous Q2H PRN  
 melatonin tablet 3 mg  3 mg Oral QHS PRN  
 albumin human 5% (BUMINATE) solution 25 g  25 g IntraVENous Q2H PRN  
 influenza vaccine 2019-20 (6 mos+)(PF) (FLUARIX/FLULAVAL/FLUZONE QUAD) injection 0.5 mL  0.5 mL IntraMUSCular PRIOR TO DISCHARGE  
 [Held by provider] aspirin delayed-release tablet 81 mg  81 mg Oral DAILY  tamsulosin (FLOMAX) capsule 0.8 mg  0.8 mg Oral DAILY  allopurinol (ZYLOPRIM) tablet 100 mg  100 mg Oral DAILY Procedures LEFT AND RIGHT HEART CATH / CORONARY ANGIOGRAPHY Pre-procedure Diagnosis LVAD (left ventricular assist device) [facility] ([facility] [F64.827] Indications LVAD (left ventricular assist device) present ([facility] [P57.377 (ICD-10-CM)] Link to printable coronary/vascular diagram report Coronary/Vascular Diagrams Phase: Baseline Data Systolic Diastolic Mean dP/dt A Wave V Wave RV Pressures 49 mmHg 5 mmHg 485 mmHg/sec PA Pressures 49 mmHg 15 mmHg 29 mmHg LV Pressures 96 mmHg 12 mmHg 824 mmHg/sec AO Pressures 87 mmHg 68 mmHg 76 mmHg RA Pressures   5 mmHg 9 mmHg 7 mmHg PCW Pressures   18 mmHg 16 mmHg 33 mmHg Phase: Baseline Data HR TDCO TDCI Brad CI PVR/SVR TPR/TVR Cardiac Output Results    3.44 L/min/m2 Resistance Results     0.15  
 0.38 Blood Oximetry PA O2 Sat  
59 % Sedation Time Moderate conscious sedation of 1 hour 7 minutes 19 seconds was performed by an independent provider giving the medication per my discretion and monitoring the vital signs throughout the case. Thank you for allowing me to participate in this patient's care. TIERRA Villaseñor [address], [address] Phone: [phone] Fax: [phone]

## 2019-11-14 NOTE — PROGRESS NOTES
ID Progress Note 2019 Subjective: No specific complaints today--seems to be doing ok Objective: Antibiotics: 1. Cefepime Vitals:  
Visit Vitals /74 Pulse 73 Temp 98 °F (36.7 °C) Resp 14 Ht 6' 2\" (1.88 m) Wt 88.8 kg (195 lb 12.3 oz) SpO2 (!) 85% BMI 25.14 kg/m² Tmax:  Temp (24hrs), Av.2 °F (36.8 °C), Min:98 °F (36.7 °C), Max:98.9 °F (37.2 °C) Exam:  Lungs:  clear to auscultation bilaterally Heart:  regular rate and rhythm Abdomen:  soft, non-tender. Bowel sounds normal. No masses,  no organomegaly Grossly bloody urine in catheter Labs:     
Recent Labs 19 
0425 19 
0424 19 
0409 WBC 3.7* 3.8* 3.8* HGB 8.0* 8.5* 8.7* PLT 82* 105* 111* BUN 12 12 13 CREA 1.28 1.31* 1.25  
SGOT 22 25 27 * 125* 124* TBILI 0.9 0.8 1.2* Cultures: No results found for: SDES Lab Results Component Value Date/Time Culture result: NO GROWTH 2 DAYS 2019 11:18 AM  
 Culture result: SERRATIA MARCESCENS (A) 10/31/2019 10:05 AM  
 Culture result: SERRATIA MARCESCENS (2ND COLONY TYPE/STRAIN) (A) 10/31/2019 10:05 AM  
 Culture result: ENTEROCOCCUS FAECALIS GROUP D (A) 10/31/2019 10:05 AM  
 
 
Radiology:  
 
Line/Insert Date:        
 
Assessment:  
 
1. UTI--Serratia (2 biotypes) from culture and small amount of Enterococcus 2. CHF/cardiomyopathy Objective: 1. Continue current therapy 2. LVAD Monday Natacha Colunga MD

## 2019-11-14 NOTE — PROGRESS NOTES
Problem: Mobility Impaired (Adult and Pediatric) Goal: *Acute Goals and Plan of Care (Insert Text) Description FUNCTIONAL STATUS PRIOR TO ADMISSION: Patient was modified independent using a single point cane for functional mobility. Patient reports an increasingly sedentary lifestyle 2* fatigue and SOB/dyspnea. Retired (so is his wife). Patient reports x 3 falls within the last couple of weeks. Patient is wearing either nasal cannula or CPAP at night. LVAD work-up has been initiated. HOME SUPPORT PRIOR TO ADMISSION: The patient lived with his wife, but did not require physical assistance. Physical Therapy Goals Initiated 10/27/2019 1. Patient will move from supine to sit and sit to supine, scoot up and down and roll side to side in bed with independence within 7 days. 2.  Patient will perform sit to/from stand with supervision/set-up within 7 days. 3.  Patient will ambulate 150 feet with least restrictive assistive device and supervision/set-up within 7 days. 4.  Patient will ascend/descend 4 stairs with  handrail(s) with supervision/set-up within 7 days for functional strengthening and community reintegration. Joellen Reddy 5.  Patient will verbally and functionally recall 3/3 sternal precautions within 7 days in preparation for LVAD implantation. 6.  Patient will perform a mock power exchange for power module to/from battery with supervision/set-up within 7 days in preparation for LVAD implantation. Outcome: Progressing Towards Goal 
 
PHYSICAL THERAPY TREATMENT Patient: Jimmy Javier (75 y.o. male) Date: 11/14/2019 Diagnosis: Acute decompensated heart failure ([facility] [I50.9] <principal problem not specified> Procedure(s) (LRB): LEFT AND RIGHT HEART CATH / CORONARY ANGIOGRAPHY (N/A) 1 Day Post-Op Precautions: Fall(R axillary impella) Chart, physical therapy assessment, plan of care and goals were reviewed. ASSESSMENT Patient continues to progress towards goals but fatigues with gait noting soft knee extension over an extended distance. Cued for erect posture and full knee extension during the stance phase of gait. He presents with muscular tremors in static standing and increase with fatigue. BP remained low but stable and no overt DOUGLAS. SpO2 remained stable on room air. He is highly motivated for progress and will progress gait as able in preparation for a planned LVAD. PLAN : 
Patient continues to benefit from skilled intervention to address the above impairments. Continue treatment per established plan of care. to address goals. Recommendation for discharge: (in order for the patient to meet his/her long term goals) To be determined: This discharge recommendation: 
Has not yet been discussed the attending provider and/or case management IF patient discharges home will need the following DME: to be determined (TBD) SUBJECTIVE:  
Patient stated It feels good to be standing up.  OBJECTIVE DATA SUMMARY:  
Critical Behavior: 
Neurologic State: Alert Orientation Level: Oriented X4 Cognition: Appropriate for age attention/concentration Safety/Judgement: Insight into deficits Functional Mobility Training: 
Bed Mobility: 
  
  
  
  
  
  
Transfers: 
Sit to Stand: Minimum assistance Stand to Sit: Contact guard assistance Balance: 
Sitting: Intact Standing: Impaired; With support Standing - Static: Good Standing - Dynamic : Fair Ambulation/Gait Training: 
Distance (ft): 110 Feet (ft)(x2 with seated rest break) Assistive Device: Gait belt;Walker, rolling Ambulation - Level of Assistance: Contact guard assistance Gait Abnormalities: Decreased step clearance Therapeutic Exercises:  
Seated ankle pumps, LAQs, marching in chair x10 each Pain Rating: 
 
 
Activity Tolerance:  
Fair Please refer to the flowsheet for vital signs taken during this treatment. After treatment patient left in no apparent distress:  
Sitting in chair, Supine in bed and Heels elevated for pressure relief COMMUNICATION/COLLABORATION:  
The patients plan of care was discussed with: Registered Nurse Ricardo Colin PT, DPT Time Calculation: 38 mins

## 2019-11-14 NOTE — PROGRESS NOTES
0800:  Report received from [facility]. Pt resting  
 
1200:  Pt purge flow decreased. Cathflo obtained and started through Impella purge. 1530:  Bedside shift change report given to [facility]. (oncoming nurse) by Angela Harris (offgoing nurse). Report included the following information SBAR, Kardex, MAR and Recent Results.

## 2019-11-14 NOTE — PROGRESS NOTES
\[facility] [place] [facility]\"" ICU Progress Note Admit Date: 10/25/2019 POD: 13 Day Post-Op Procedure:  Procedure(s): RIGHT AXILLARY IMPELLA INSERTION Subjective:  
Pt seen with Dr. Soraya Eckert. No new complaints. Impella P8, D5 purge. Objective:  
Vitals: 
Blood pressure 102/81, pulse 73, temperature 98 °F (36.7 °C), resp. rate 20, height 6' 2\" (1.88 m), weight 195 lb 12.3 oz (88.8 kg), SpO2 99 %. Temp (24hrs), Av.1 °F (36.7 °C), Min:98 °F (36.7 °C), Max:98.2 °F (36.8 °C) Hemodynamics: 
 CO: CO (l/min): 8.3 l/min CI: CI (l/min/m2): 4 l/min/m2 CVP: CVP (mmHg): 12 mmHg (19 1000) SVR: SVR (dyne*sec)/cm5: 781 (dyne*sec)/cm5 (19 1500) PAP Systolic: PAP Systolic: 45 (94/82/06 7942) PAP Diastolic: PAP Diastolic: 19 (35/58/88 3819) PVR:   
 SV02: SVO2 (%): 51 % (19) SCV02: SCVO2 (%): 75 % (10/29/19 1900) EKG/Rhythm:   
Paced in the 90s Oxygen Therapy: 
Oxygen Therapy O2 Sat (%): 99 % (19 1000) Pulse via Oximetry: 83 beats per minute (19) O2 Device: Nasal cannula (19 040) O2 Flow Rate (L/min): 2 l/min (19 0400) FIO2 (%): 40 % (10/30/19 0846) CXR:  
CXR Results  (Last 48 hours)  
          
 19 5404  XR CHEST PORT Final result Impression:  IMPRESSION:  
No significant interval change. Narrative:  PORTABLE CHEST RADIOGRAPH/S: 2019 5:27 AM  
   
Clinical history: Postoperative heart INDICATION:   postop heart COMPARISON: 2019 FINDINGS:  
AP portable upright view of the chest demonstrates a stable  cardiopericardial  
silhouette. The lungs are adequately expanded. Cardiac assist device and PICC  
line catheter unchanged. Minimal interstitial edema unchanged. Cardiac pacer as  
well. . The osseous structures are unremarkable. Patient is on a cardiac monitor. 19 0511  XR CHEST PORT Final result Impression:  IMPRESSION:  
No interval change. Narrative:  PORTABLE CHEST RADIOGRAPH/S: 2019 5:11 AM  
   
Clinical history: Postoperative heart INDICATION:   postop heart COMPARISON: 2019 FINDINGS:  
AP portable upright view of the chest demonstrates a stable  cardiopericardial  
silhouette. The lungs are adequately expanded. Minimal interstitial edema. No  
consolidation or pneumothorax. . Cardiac assist device is unchanged. Cardiac  
pacer unchanged. PICC line catheter unchanged. Mild interstitial edema and  
cardiomegaly. . Patient is on a cardiac monitor. Admission Weight: Last Weight Weight: 192 lb 10.9 oz (87.4 kg) Weight: 195 lb 12.3 oz (88.8 kg) Intake / Output / Drain: 
Current Shift:  0701 -  1900 In: [de-identified] [I.V.:80] Out: - Last 24 hrs.:  
 
Intake/Output Summary (Last 24 hours) at 2019 1031 Last data filed at 2019 8007 Gross per 24 hour Intake 80 ml Output 1020 ml Net -940 ml EXAM: 
General:  NAD. Up in the chair Lungs:   Diminished in the bases. Incision:  Impella dressing C/D/I Heart:  Regular rate and rhythm, S1, S2 normal, no murmur, click, rub or gallop. Abdomen:   Soft, non-tender. Bowel sounds hypoactive. No masses,  No organomegaly. Extremities:  +2 bilateral lower extremity pitting edema. PPP. Neurologic:  Gross motor and sensory apparatus intact. Labs:  
Recent Labs 19 
0720 19 
0425 WBC  --  3.7* HGB  --  8.0* HCT  --  26.7*  
PLT  --  82* NA  --  134* K  --  3.9 BUN  --  12  
CREA  --  1.28  
GLU  --  84 GLUCPOC 162*  --   
INR  --  1.1 Assessment:  
 
Active Problems: 
  Acute decompensated heart failure ([facility] [address] (10/25/2019) Plan/Recommendations/Medical Decision Makin. Acute on chronic systolic (CHF Class IV) S/P Impella: Has ICD.  LV EF 16-20%. No BB/ACE/ARB/AA until appropriate. Holding diuresis per AHFS. Trend proBNP, lactate, LDH. Strict I/Os. Daily weights. LVAD w/u in process - looking towards 11/18/19. On Ancef for ppx. Continue D5 purge due to hematuria/epistaxis 2. Thrombocytopenia: Platelets at 809R today. No asa. HIT negative, LOAN negative. On protonix. Heme following. 3. CAD S/p CABG 2010: Needs [facility] today w/ Dr. James Barbosa. Stopped asa d/t hematuria/thrombocytopenia, not on statin historically. No BB d/t lower BP, on hydralazine but may need to hold 4. PAF s/p DCCV 6/2019: Holding eliquis due to hematuria/epistaxis. On PO amio. 5. JUAN J on CKD stage IV: Monitor. Renal following. Diuretics PRN (currently holding) CVP >10. Keep neal. 6. Hx of urinary retention/BPH, hematuria: On flomax. Neal in place, CBI stopped since urine clear. Urology ok to DC neal. Cystoscopy clear. 7. JOSE: qhs CPAP. 8. Hx of sternal wound infection requring sternectomy: Not a contraindication for future LVAD per Dr. Fany Heart. Tagged WBC -- showed no abnormal tracer uptake. 9. Iron def anemia: s/p venofer. H&H stable. On Epogen. Cannot use doxy/octreotide d/t prolonged QTC. Prev +occult stool. EGD/colonoscopy 11/11/19 neg for any acute process/abnormality, only revealed diverticulitis 10. Vocal cord paralysis: Voice improving. Speech eval PRN. Advance diet as tolerated. 11. Constipation: Resolved. Continue pericolace, miralax (pt keeps refusing). Continue daily suppository PRN. 12. Serratia UTI/hematuria: completed Zosyn. Monitor 13. Mediastinal lymphadenopathy:  Per AHF, low threshold for Carcinoma per CT scans. Eventually needs PET scan. 14. Hyponatremia: Resolved-monitor 15. Acute Moderate protein-calorie malnutrition:  Pre-albumin 10/25 was 13.9. Repeat 19.4. Pt eating cardiac diet well. Continue marinol Dispo: Care and orders per AHFS. Remain in CCU. Plan for LVAD 11-18-19. Signed By: MALCOLM Ventura Saw patient, agree with above Risk of morbidity and mortality - high Medical decision making - high complexity 1. Acute on chronic systolic (CHF Class IV) S/P Impella:  
 
2. Thrombocytopenia: Platelets improving at 98K. No asa. HIT negative, LOAN negative. On protonix. Heme following. 3. CAD S/p CABG 2010: Needs LHC, timing TBD, once UTI has cleared. Stop asa d/t hematuria/thrombocytopenia, not on statin historically. No BB D/t pressors/intropes. 4. PAF s/p DCCV 6/2019: Holding eliquis due to hematuria/epistaxis. On PO amio. 5. JUAN J on CKD stage IV: Monitor. Renal following. Diuretics PRN CVP >10. Keep neal. 6. Hx of urinary retention/BPH, hematuria:  On flomax. Neal with CBI per Urology but will likely wean off. Urology following. 7. JOSE: qhs CPAP. 8. Hx of sternal wound infection requring sternectomy: Not a contraindication for future LVAD. Tagged WBC -- showed no abnormal tracer uptake. 9. Iron def anemia: s/p venofer. H&H stable today. On Epogen. Cannot use doxy/octreotide d/t prolonged QTC. Occult blood in stool positive per POC testing. Gastroenterology following for LVAD work up. 10. Vocal cord paralysis: Voice improving. Speech eval PRN. Advance diet as tolerated. 11. Constipation: Resolved last BM 11/6. Continue pericolace, miralax(pt keeps refusing). Continue daily suppository PRN. 12. Serratia UTI/hematuria: Urology following planning to transition off CBI. On Zosyn-ID following. 13. Mediastinal lymphadenopathy:  Per AHF, low threshold for Carcinoma per CT scans. Eventually needs PET scan. 14. Hyponatremia: Resolved-monitor 15. Acute Moderate protein-calorie malnutrition:  Pre-albumin 10/25 was 13.9. Recheck 11/4 was 16.2. Pt eating cardiac diet well. Continue marinol

## 2019-11-14 NOTE — PROGRESS NOTES
NYHA class IV A/C systolic heart failure Low EF (10%) Inotrope dependent Malnutrition  
LVAD Work-up Epistaxis Hematuria 
  
Pre-Op Plan (11/18/19 implant) : 
1) Platelets a little low - will need to be above 150 before surgery 2) Want Hgb 8.5 or greater 3) Holding off on lines as RHC looked good 4) Start Vanc when we get in the room 5) Make sure bend relief lock is in place 6) Start drips after we finish the inflow cannula 7) Needs colonoscopy 8) Needs [facility]  
  
  
Op Plan: 
1) Will not need sternal saw - can go right to incision and dissection (need ANDREA bar's) 2) Dissection:  
            M. The RV rises above level of posterior sternal plate inferiorly (start high) 
            B. The innominate vein is unusually low (can feel for the wires) 
            C. Will likely need to incorporate proximal CABG anastomosis in side-biter                  for outflow graft             T. LIMA to LAD graft is fairly lateral and should be out of the way             E. Will need to get aorta and LV apex out as usual 
            F. Place drive-line (somewhat thin adipose tissue) 3) Cannulation (give heparin): 
            C. Left groin 25 Fr venous (will save the right in case we need RVAD;  
                FVN place triple lumen early) 
            B. Build 10 mm graft side-arm off Impella graft (glue; will need to extend                  axiallary graft back); have 3/8 connector already attached  
4) Implant: 
            F. Remove Impella, de-clot graft (#4 Shakila), back-flush, hook up to bypass  
                [id number], go on bypass (place extra sucker in axillary wound             N. Packs, find apex, and core (remove trabeculations; suture in CO2 and                  pump sucker lines) 
            C. Place (4) 2-0 Ethibond sutures, bring them through inflow cannula sewing  
                 ring, and tie down 
            D. Place (2) 4-0 SH running sutures, bioglue             E. Attach pump, remove packs, and lower into well 
            F. Clamp outflow graft, turn on drips, and turn on pump at 3000 
            G. Measure outflow graft (add 1 cm), side-biter, aortotomy, and outflow graft                  anastomosis             H. Place de-airing stitch and remove clamps  
            I. Come off bypass and up on LVAD as usual 
            J. Staple off axillary graft  
  
Chest / Abdominal CT scan:  
  
Sternum is  although it looks like he still has sternal bone; the right half is higher than the left half and will need to some down; should be able to close with 12 standard wires although the sternum is thin; back-up plan would be a weave  
  
RV is coming above the sternal edge somewhat at the inferior margin so need to be careful here 
  
Proximal comes off anterior portion of the aorta; may need to place side-biter such that it incorporates the proximal; needs a LHC to figure out which way the graft is going; no significant calcium in the ascending aorta; Impella in place  
  
LIMA to LAD graft is fairly lateral so should be out of the way for dissection  
  
Right atrium looks a little big 
  
Innominate vein is a little low so need to be carefull with dissection (should be able to feel the pace leads)  
  Bilateral common iliac arteries are severely calcified and narrowed down into the 4 mm range; will be somewhat hazardous trying to place femoral arterial cannula; will be best to extend out impella graft, build 10 mm side-arm, and go on through the axillary artery; will need to flush out the graft after we pull the Impella and before going on bypass (timing will be important here); will need to place the femoral venous line first 
  
Abdominal wall adipose tissue is thin so need to be careful with drive-line  
  
LHC - pending 
  
TTE - severely dilated LV cavity (7.36 cm) with reduced EF (10%); mild MR, large left atrium; previous TTE's did not reveal any AI; has moderate RV dysfunction (RVIDD 5.4, RV/LV ratio 0.73, TAPSE 1.4, mild TR; good free wall motion); little worried that sternal closure with compress his RV so will be prepare for temp RVAD support  
  
Carotids - normal 
  
ABIs - normal  
  
PFT's - FEV-1 2.25 (59% predicted)  
  
Epistaxis - resolved  
  
Hematuria - resolved  
  
Hgb 8.2, platelets 98, INR 1.1 
  
BUN 15, creatinine 1.13 
  
Bilirubin 1.3, ALT 28, AST 35, alk phos 130 
  
LDH - 438, lactic acid 0.9 
  
NT pro-BNP - 7103  
  
CEA - 6.6 (mildy elevated)  
  
CXR - mild pulmonary edema 
  
Impella - flow 3.5 L/min @ P-6 Looks good today Vit K starting tmw Hgb and platelets a little low - will need to transfuse over the weekend Creatinine normal 
 
Bilirubin and other LFTs look good LDH and lactic acid reasonable NT pro-BNP about the same Pre-albumin improved CXR - mild pulmonary edema Impella - flow 3 L/min @ P-6 Intake/Output Summary (Last 24 hours) at 11/14/2019 1442 Last data filed at 11/14/2019 7944 Gross per 24 hour Intake 80 ml Output 720 ml Net -640 ml Visit Vitals BP 94/72 Pulse 70 Temp 98.9 °F (37.2 °C) Resp 22 Ht 6' 2\" (1.88 m) Wt 195 lb 12.3 oz (88.8 kg) SpO2 94% BMI 25.14 kg/m² Risk of morbidity and mortality - high Medical decision making - high complexity Total critical care time - 30 minutes (CPT 86005)

## 2019-11-14 NOTE — ROUTINE PROCESS
16: 00SBAR report from Cherry 17:00 Up to [facility] for  
 
19:30 Bedside shift change report given to Eloisa (oncoming nurse) by Alisa Mendez (offgoing nurse). Report included the following information SBAR, Kardex, Procedure Summary, Intake/Output, MAR, Accordion, Recent Results, Med Rec Status, Cardiac Rhythm Paced rhythm, Alarm Parameters , Pre Procedure Checklist, Procedure Verification and Quality Measures.

## 2019-11-14 NOTE — PROGRESS NOTES
[employer] 
 [id number] [facility], [address] [facility] Phone: [phone]   [phone]   
  
Nephrology Progress Note Sona Kiser     1950     [id number] Date of Admission : 10/25/2019 
11/14/19 CC:  Follow up for JUAN J, CKD, Hyponatremia Assessment and Plan JUAN J on CKD : 
- 2/2 CRS and resolved and better than baseline  
- hold on further albumin 
- hold diuretics today post cath and monitor 
- can resume diuresis in AM if Cr stable CKD III at baseline w/ L renal atrophy: - NM scan shows equal function 
- baseline Cr elevated from CRS and urinary retention issues - Cr stable Gross hematuria, BPH w/ retention: 
- off CBI now  
- no issues on cysto 
- ok to remove neal per urology Serratia and Enteroccocus UTI: 
- per ID Hyponatremia: 
- 2/2 CHF  
- watch for now Hoarseness, vocal cord paralysis, mediastinal adenopathy: 
- will need eventual PET/CT 
  
Acute on Chronic HFrEF  
- EF 16-20%, NYHA Class IV , hx of VF arrest - s/p AICD 
- Impella insertion 10/29 
- LVAD eval completed 
  
JOSE on CPAP  
  
PAF s/p DCCV 6/19 
  
Chronic Anemia: 
- from CKD and iron deficiency - s/p IV iron, now on PAVEL 
- hgb stable 
- EGD normal. CSY - Diverticulosis Interval History:   
Seen and examined. Feeling ok. LE edema present. Cr stbale,. Voiding ok. CVP running around 10-12 per RN. No cp, sob, n/v/d. Review of Systems: Pertinent items are noted in HPI. Current Medications:  
Current Facility-Administered Medications Medication Dose Route Frequency  sodium chloride (NS) flush 5-40 mL  5-40 mL IntraVENous Q8H  
 sodium chloride (NS) flush 5-40 mL  5-40 mL IntraVENous PRN  
 naloxone (NARCAN) injection 0.4 mg  0.4 mg IntraVENous PRN  
 diphenhydrAMINE (BENADRYL) injection 25 mg  25 mg IntraVENous Q4H PRN  
 oxymetazoline (AFRIN) 0.05 % nasal spray 2 Spray  2 Spray Right Nostril BID PRN  
  epoetin yvonne-epbx (RETACRIT) injection 20,000 Units  20,000 Units SubCUTAneous Q TUE, THU & SAT  bumetanide (BUMEX) injection 1 mg  1 mg IntraVENous Q6H PRN  
 lactobac ac& pc-s.therm-b.anim (TIAN Q/RISAQUAD)  1 Cap Oral DAILY  sodium chloride (NS) flush 5-40 mL  5-40 mL IntraVENous PRN  
 simethicone (MYLICON) 15NQ/7.7IA oral drops 80 mg  1.2 mL Oral Multiple  ceFAZolin (ANCEF) 2 g/20 mL in sterile water IV syringe  2 g IntraVENous Q8H  
 benzocaine-menthol (CEPACOL) lozenge 1 Lozenge  1 Lozenge Mucous Membrane PRN  
 hydrALAZINE (APRESOLINE) tablet 10 mg  10 mg Oral TID  
 0.9% sodium chloride infusion 250 mL  250 mL IntraVENous PRN  
 amiodarone (CORDARONE) tablet 100 mg  100 mg Oral DAILY  dronabinol (MARINOL) capsule 2.5 mg  2.5 mg Oral ACB&D  
 insulin lispro (HUMALOG) injection   SubCUTAneous AC&HS  sodium chloride (OCEAN) 0.65 % nasal squeeze bottle 2 Spray  2 Spray Both Nostrils QID  alteplase (CATHFLO) 1 mg in dextrose 5% 50 mL impella purge solution  1 mg Other TITRATE  pantoprazole (PROTONIX) tablet 40 mg  40 mg Oral ACB  dextrose 5% infusion  4-20 mL/hr IntraVENous CONTINUOUS  
 [Held by provider] bivalirudin (ANGIOMAX) 250 mg in dextrose 5% 250 mL infusion  4-20 mL/hr Other TITRATE  alteplase (CATHFLO) 1 mg in sterile water (preservative free) 1 mL injection  1 mg InterCATHeter PRN  
 bacitracin 500 unit/gram packet 1 Packet  1 Packet Topical PRN  
 sodium chloride (NS) flush 5-40 mL  5-40 mL IntraVENous Q8H  
 sodium chloride (NS) flush 5-40 mL  5-40 mL IntraVENous PRN  
 0.45% sodium chloride infusion  10 mL/hr IntraVENous CONTINUOUS  
 0.9% sodium chloride infusion  10 mL/hr IntraVENous CONTINUOUS  
 oxyCODONE IR (ROXICODONE) tablet 5 mg  5 mg Oral Q4H PRN  
 oxyCODONE IR (ROXICODONE) tablet 10 mg  10 mg Oral Q4H PRN  
 morphine injection 4 mg  4 mg IntraVENous Q2H PRN  
 naloxone (NARCAN) injection 0.4 mg  0.4 mg IntraVENous PRN  
  ondansetron (ZOFRAN) injection 4 mg  4 mg IntraVENous Q4H PRN  
 albuterol (PROVENTIL VENTOLIN) nebulizer solution 2.5 mg  2.5 mg Nebulization Q4H PRN  chlorhexidine (PERIDEX) 0.12 % mouthwash 10 mL  10 mL Oral Q12H  
 magnesium oxide (MAG-OX) tablet 400 mg  400 mg Oral BID  calcium chloride 1 g in 0.9% sodium chloride 250 mL IVPB  1 g IntraVENous PRN  
 bisacodyl (DULCOLAX) suppository 10 mg  10 mg Rectal DAILY PRN  
 senna-docusate (PERICOLACE) 8.6-50 mg per tablet 1 Tab  1 Tab Oral BID  polyethylene glycol (MIRALAX) packet 17 g  17 g Oral DAILY  ELECTROLYTE REPLACEMENT NOTE: Nurse to review Serum Potassium and Magnesuim levels and Initiate Electrolyte Replacement Protocol as needed  1 Each Other PRN  
 magnesium sulfate 1 g/100 ml IVPB (premix or compounded)  1 g IntraVENous PRN  
 glucose chewable tablet 16 g  4 Tab Oral PRN  
 glucagon (GLUCAGEN) injection 1 mg  1 mg IntraMUSCular PRN  
 dextrose 10% infusion 0-250 mL  0-250 mL IntraVENous PRN  
 morphine injection 2 mg  2 mg IntraVENous Q2H PRN  
 melatonin tablet 3 mg  3 mg Oral QHS PRN  
 albumin human 5% (BUMINATE) solution 25 g  25 g IntraVENous Q2H PRN  
 influenza vaccine 2019-20 (6 mos+)(PF) (FLUARIX/FLULAVAL/FLUZONE QUAD) injection 0.5 mL  0.5 mL IntraMUSCular PRIOR TO DISCHARGE  
 [Held by provider] aspirin delayed-release tablet 81 mg  81 mg Oral DAILY  tamsulosin (FLOMAX) capsule 0.8 mg  0.8 mg Oral DAILY  allopurinol (ZYLOPRIM) tablet 100 mg  100 mg Oral DAILY No Known Allergies Objective: 
Vitals:   
Vitals:  
 11/14/19 0400 11/14/19 0500 11/14/19 0600 11/14/19 0700 BP: 106/84 102/87 93/65 Pulse: 70 71 92 71 Resp: 17 14 23 15 Temp: 98 °F (36.7 °C) SpO2: 96% 99% 94% 98% Weight: 88.8 kg (195 lb 12.3 oz) Height:      
 
Intake and Output: 
No intake/output data recorded. 11/12 1901 - 11/14 0700 In: 562 [I.V.:562] Out: 1850 [DXKFF:9256] Physical Examination: 
 
General: No distress Neck:  Supple, no mass Resp:  Lungs few dependent rales CV:  Impella sounds  2+ b/l LE edema GI:  Soft, NT, + Bowel sounds Neurologic:  AOx3, nonfocal exam 
:  Lizama w/ clear urine [x]    High complexity decision making was performed 
[]    Patient is at high-risk of decompensation with multiple organ involvement Lab Data Personally Reviewed: I have reviewed all the pertinent labs, microbiology data and radiology studies during assessment. Recent Labs 11/14/19 0425 11/13/19 0424 11/12/19 
0409 * 133* 137  
K 3.9 3.8 3.5  99 101 CO2 29 29 29 GLU 84 87 86 BUN 12 12 13 CREA 1.28 1.31* 1.25  
CA 8.5 8.6 8.9 MG 1.9 2.1 2.0 ALB 2.6* 2.3* 2.4* SGOT 22 25 27 ALT 9* 14 22 INR 1.1 1.1 1.2* Recent Labs 11/14/19 0425 11/13/19 0424 11/12/19 
0409 WBC 3.7* 3.8* 3.8* HGB 8.0* 8.5* 8.7* HCT 26.7* 27.5* 28.1*  
PLT 82* 105* 111* No results found for: SDES Lab Results Component Value Date/Time Culture result: NO GROWTH 2 DAYS 11/12/2019 11:18 AM  
 Culture result: SERRATIA MARCESCENS (A) 10/31/2019 10:05 AM  
 Culture result: SERRATIA MARCESCENS (2ND COLONY TYPE/STRAIN) (A) 10/31/2019 10:05 AM  
 Culture result: ENTEROCOCCUS FAECALIS GROUP D (A) 10/31/2019 10:05 AM  
 Culture result: NO GROWTH 5 DAYS 10/25/2019 07:14 PM  
 
Recent Results (from the past 24 hour(s)) GLUCOSE, POC Collection Time: 11/13/19  2:57 PM  
Result Value Ref Range Glucose (POC) 86 65 - 100 mg/dL Performed by Lesli Celeste POC ACTIVATED CLOTTING TIME Collection Time: 11/13/19  4:40 PM  
Result Value Ref Range Activated Clotting Time (POC) 125 79 - 138 SECS  
CARDIAC PROCEDURE Collection Time: 11/13/19  5:04 PM  
Result Value Ref Range EDP 16 GLUCOSE, POC Collection Time: 11/13/19  5:42 PM  
Result Value Ref Range Glucose (POC) 92 65 - 100 mg/dL Performed by Lesli Celeste GLUCOSE, POC  Collection Time: 11/13/19 10:16 PM  
 Result Value Ref Range Glucose (POC) 148 (H) 65 - 100 mg/dL Performed by Seth BRAVO Collection Time: 11/14/19  4:25 AM  
Result Value Ref Range  (H) 85 - 241 U/L  
NT-PRO BNP Collection Time: 11/14/19  4:25 AM  
Result Value Ref Range NT pro-BNP 7,682 (H) <125 PG/ML  
LACTIC ACID Collection Time: 11/14/19  4:25 AM  
Result Value Ref Range Lactic acid 0.6 0.4 - 2.0 MMOL/L  
PROTHROMBIN TIME + INR Collection Time: 11/14/19  4:25 AM  
Result Value Ref Range INR 1.1 0.9 - 1.1 Prothrombin time 11.5 (H) 9.0 - 11.1 sec PTT Collection Time: 11/14/19  4:25 AM  
Result Value Ref Range aPTT 28.7 22.1 - 32.0 sec  
 aPTT, therapeutic range     58.0 - 77.0 SECS  
MAGNESIUM Collection Time: 11/14/19  4:25 AM  
Result Value Ref Range Magnesium 1.9 1.6 - 2.4 mg/dL CBC W/O DIFF Collection Time: 11/14/19  4:25 AM  
Result Value Ref Range WBC 3.7 (L) 4.1 - 11.1 K/uL  
 RBC 2.69 (L) 4.10 - 5.70 M/uL HGB 8.0 (L) 12.1 - 17.0 g/dL HCT 26.7 (L) 36.6 - 50.3 % MCV 99.3 (H) 80.0 - 99.0 FL  
 MCH 29.7 26.0 - 34.0 PG  
 MCHC 30.0 30.0 - 36.5 g/dL RDW 21.6 (H) 11.5 - 14.5 % PLATELET 82 (L) 939 - 400 K/uL MPV 10.3 8.9 - 12.9 FL  
 NRBC 0.0 0  WBC ABSOLUTE NRBC 0.00 0.00 - 0.01 K/uL METABOLIC PANEL, COMPREHENSIVE Collection Time: 11/14/19  4:25 AM  
Result Value Ref Range Sodium 134 (L) 136 - 145 mmol/L Potassium 3.9 3.5 - 5.1 mmol/L Chloride 102 97 - 108 mmol/L  
 CO2 29 21 - 32 mmol/L Anion gap 3 (L) 5 - 15 mmol/L Glucose 84 65 - 100 mg/dL BUN 12 6 - 20 MG/DL Creatinine 1.28 0.70 - 1.30 MG/DL  
 BUN/Creatinine ratio 9 (L) 12 - 20 GFR est AA >60 >60 ml/min/1.73m2 GFR est non-AA 56 (L) >60 ml/min/1.73m2 Calcium 8.5 8.5 - 10.1 MG/DL Bilirubin, total 0.9 0.2 - 1.0 MG/DL  
 ALT (SGPT) 9 (L) 12 - 78 U/L  
 AST (SGOT) 22 15 - 37 U/L Alk.  phosphatase 121 (H) 45 - 117 U/L  
 Protein, total 5.7 (L) 6.4 - 8.2 g/dL Albumin 2.6 (L) 3.5 - 5.0 g/dL Globulin 3.1 2.0 - 4.0 g/dL A-G Ratio 0.8 (L) 1.1 - 2.2 TYPE & SCREEN Collection Time: 11/14/19  4:25 AM  
Result Value Ref Range Crossmatch Expiration 11/17/2019 ABO/Rh(D) O POSITIVE Antibody screen POS Comment Previously identified Nonspecific Antibody and Nonspecific Cold Antibody Antibody ID NO ADDITIONAL ANTIBODIES DETECTED Unit number [id number] Blood component type RC LR Unit division 00 Status of unit ALLOCATED Crossmatch result Compatible GLUCOSE, POC Collection Time: 11/14/19  7:20 AM  
Result Value Ref Range Glucose (POC) 162 (H) 65 - 100 mg/dL Performed by Lewis Small Total time spent with patient:  xxx   min. Care Plan discussed with: 
Patient Family RN Consulting Physician [facility],      
 
I have reviewed the flowsheets. Chart and Pertinent Notes have been reviewed. No change in PMH ,family and social history from Consult note.  
 
 
Meryl Ward MD

## 2019-11-14 NOTE — PROGRESS NOTES
NUTRITION COMPLETE ASSESSMENT 
 
RECOMMENDATIONS:  
1. Daily weights. 2. Document PO and ONS intake. 3. Encourage ONS consumption at meals. Interventions/Plan:  
Food/Nutrient Delivery:  General/healthful diet Commercial supplement(Ensure Enlive 1x/day(350 kcal, 20 g pro), Magic Cup 1x/day(290 kcal, 9 g pro), Boost Pudding 1x/day(240 kcal, 7 g pro)) Nutrition Education:    
Coordination of Care:   
Nutrition Counseling:     
 
Assessment:  
Reason for Assessment:  
[x] Reassessment Diet: Cardiac Supplements: Ensure Enlive BID - rotate flavors, PM snack boost pudding, HS snack Magic cup Nutritionally Significant Medications: [x] Reviewed & Includes: zyloprim, marinol, retacrit, apersoline, mag ox, miralax, pericoalcePRN; dulcolax, narcan, zofran Meal Intake:  
Patient Vitals for the past 100 hrs: 
 % Diet Eaten 11/12/19 0926 90 % 11/10/19 1200 100 % 11/10/19 0800 100 % Subjective: 
Just call me Dolphus! I did not finish my breakfast because my son brought me a sausage biscuit at 5:30 this morning. I have not been getting my Magic Cup or Boost Pudding. I have just been getting vanilla Ensure and I am tired of it. Objective: 
Mr Salomón Glynn was admitted with acute decompensated heart failure. PMHx: HTN, CKD, CAD chronic systolic heart failure, depression, sternal wound. Noted: acute on chronic systolic heart failure, ICM, Impella placed 10/29, undergoing LVAD work-up, has been approved and planned for 11/18; mediastinal lymphadenopathy; CKD IV and hyponatremia (volume overload); [facility] 11/13. Pt met the requirements for malnutrition on admission, but has improved. Started on Marinol 11/5 due to decreased appetite. Pt's intake and appetite has greatly improved since admission. Family has been bringing in outside food that he has also been consuming.  He reported that he has not been receiving his Boost Pudding PM snack or Magic Cup HS snack and he was only drinking one Ensure a day so RD to change order: Ensure Enlive (jeanne) on breakfast tray, Boost Pudding with lunch tray, and Magic Cup with dinner tray to ensure optimal intake. Continue to document both ONS and meal intake. Wt increased 3 kilos in the past week - noted new RUE edema. Estimated Nutrition Needs:  
Kcals/day: 2000 Kcals/day([id number] (MSJ x 1.1-1.2) Protein: 100 g(1.2g/kg) Fluid: (1 ml/kcal) Based On: [place] 1898 Weight Used: Actual wt(83 kg) Pt expected to meet estimated nutrient needs:  [x]   Yes     []  No  [] Unable to predict at this time Nutrition Diagnosis:  
1. Unintended weight loss related to CHF vs depression vs malignancy as evidenced by >10% weight loss x 6 months. 2. Inadequate oral intake(improving) related to poor PO intake as evidenced by >40% consumption of meals Goals:   
 Consume >75% of all meals and ONS Monitoring & Evaluation: - Total energy intake, Protein intake - Weight/weight change, CV-pulmonary Previous Nutrition Goals Met: 
Yes Previous Recommendations:     
Yes 
 
Education & Discharge Needs: 
 [x] None Identified 
 [] Identified and addressed  
 [] Participated in care plan, discharge planning, and/or interdisciplinary rounds Cultural, Tenriism and ethnic food preferences identified: 
 None Skin Integrity: [x]Intact  []Other Edema: []None [x]Other  2+ BLE, 1+ RUE Last BM: 11/11/19 Food Allergies: [x]None []Other Anthropometrics:   
Weight Loss Metrics 11/14/2019 10/25/2019 10/25/2019 10/25/2019 9/30/2019 9/18/2019 9/16/2019 Today's Wt 195 lb 12.3 oz - 193 lb 6.4 oz - 199 lb 14.4 oz 196 lb 199 lb BMI - 25.14 kg/m2 - 24.83 kg/m2 25.67 kg/m2 25.16 kg/m2 25.55 kg/m2 Last 3 Recorded Weights in this Encounter 11/12/19 0400 11/13/19 0400 11/14/19 0400 Weight: 87.5 kg (192 lb 14.4 oz) 88.4 kg (194 lb 14.2 oz) 88.8 kg (195 lb 12.3 oz) Weight Source: Bed 
 Height: 6' 2\" (188 cm), Body mass index is 25.14 kg/m². IBW : 86.2 kg (190 lb), % IBW (Calculated): 96.32 % 
 ,   
 
Labs:   
Lab Results Component Value Date/Time Sodium 134 (L) 11/14/2019 04:25 AM  
 Potassium 3.9 11/14/2019 04:25 AM  
 Chloride 102 11/14/2019 04:25 AM  
 CO2 29 11/14/2019 04:25 AM  
 Glucose 84 11/14/2019 04:25 AM  
 BUN 12 11/14/2019 04:25 AM  
 Creatinine 1.28 11/14/2019 04:25 AM  
 Calcium 8.5 11/14/2019 04:25 AM  
 Magnesium 1.9 11/14/2019 04:25 AM  
 Phosphorus 2.4 (L) 06/04/2019 04:09 AM  
 Albumin 2.6 (L) 11/14/2019 04:25 AM  
 
Lab Results Component Value Date/Time Hemoglobin A1c 6.6 (H) 10/25/2019 02:56 PM  
 
Lab Results Component Value Date/Time Glucose 84 11/14/2019 04:25 AM  
 Glucose (POC) 162 (H) 11/14/2019 07:20 AM  
  
Lab Results Component Value Date/Time ALT (SGPT) 9 (L) 11/14/2019 04:25 AM  
 AST (SGOT) 22 11/14/2019 04:25 AM  
 Alk. phosphatase 121 (H) 11/14/2019 04:25 AM  
 Bilirubin, total 0.9 11/14/2019 04:25 AM  
  
 
[facility] Dietetic Intern

## 2019-11-15 NOTE — PROGRESS NOTES
Problem: Self Care Deficits Care Plan (Adult) Goal: *Acute Goals and Plan of Care (Insert Text) Description FUNCTIONAL STATUS PRIOR TO ADMISSION: Patient was modified independent using a single point cane for functional mobility. Patient able to shower and dress himself. However, patient required assistance for household management from his wife. HOME SUPPORT: The patient lived alone with wife to provide assistance. Occupational Therapy Goals: 
 
Goals reviewed and continued/modified as follows 11/12/19 1. Patient will perform bathing with supervision/set-up within 7 day(s). 2.  Patient will perform lower body dressing with supervision/set-up within 7 day(s). 3.  Patient will perform grooming with modified independence within 7 day(s). 4.  Patient will perform toilet transfers with modified independence within 7 day(s). 5.  Patient will perform all aspects of toileting with supervision/set-up within 7 day(s). 6.  Patient will participate in upper extremity therapeutic exercise/activities with supervision/set-up for 5 minutes within 7 day(s). 7.  Patient will utilize energy conservation techniques during functional activities with verbal cues within 7 day(s). 8. Patient will verbalize LVAD terminology with verbal cues within 7 day (s) in preparation for implant. Continue all goals 10/30/19 post re-eval for Impella removal  
Initiated 10/28/2019 1. Patient will perform bathing with supervision/set-up within 7 day(s). 2.  Patient will perform lower body dressing with supervision/set-up within 7 day(s). 3.  Patient will perform grooming with modified independence within 7 day(s). 4.  Patient will perform toilet transfers with modified independence within 7 day(s). 5.  Patient will perform all aspects of toileting with supervision/set-up within 7 day(s).  
6.  Patient will participate in upper extremity therapeutic exercise/activities with supervision/set-up for 5 minutes within 7 day(s). 7.  Patient will utilize energy conservation techniques during functional activities with verbal cues within 7 day(s). Outcome: Progressing Towards Goal 
 
OCCUPATIONAL THERAPY TREATMENT Patient: Ciro Bradford (75 y.o. male) Date: 11/15/2019 Diagnosis: Acute decompensated heart failure ([facility] [address] [I50.9] <principal problem not specified> Procedure(s) (LRB): LEFT AND RIGHT HEART CATH / CORONARY ANGIOGRAPHY (N/A) 2 Days Post-Op Precautions: Fall(R axillary impella) Chart, occupational therapy assessment, plan of care, and goals were reviewed. ASSESSMENT Patient continues with skilled OT services and is progressing towards goals. Patient remains very motivated to work with therapy, donned B socks via cross leg in semi reclined position in bed, verbal cues not to hold breath while performing. Practiced sit to stand with CGA-min A from low surfaces without UE support in prep for upcoming LVAD sx and sternotomy. With fatigue, pt continues to demonstrate soft knee buckling and LEs become tremulous, making pt a large fall risk. Pt scheduled for LVAD sx Monday 11/18. Current Level of Function Impacting Discharge (ADLs): MIN A stand from low surfaces, CGA to min A for ADLs Other factors to consider for discharge: independent prior, active, upcoming LVAD PLAN : 
Patient continues to benefit from skilled intervention to address the above impairments. Continue treatment per established plan of care. to address goals. Recommend with staff: OOB to chair and BSC Recommend next OT session: re-evaluation post LVAD Recommendation for discharge: (in order for the patient to meet his/her long term goals) To be determined: post LVAD sx Monday 11/18 This discharge recommendation: 
Has not yet been discussed the attending provider and/or case management IF patient discharges home will need the following DME: to be determined (TBD) SUBJECTIVE:  
Patient stated I wear shoes with special orthotics for my arches.  OBJECTIVE DATA SUMMARY:  
Cognitive/Behavioral Status: 
Neurologic State: Appropriate for age Orientation Level: Oriented X4 Cognition: Appropriate decision making Functional Mobility and Transfers for ADLs: 
Bed Mobility: 
Supine to Sit: Supervision Sit to Supine: Contact guard assistance Scooting: Contact guard assistance Transfers: 
Sit to Stand: Minimum assistance from low surfaces without use of UEs in prep for upcoming sternotomy Balance: 
Sitting: Intact Standing: Impaired; With support Standing - Static: Good Standing - Dynamic : Fair ADL Intervention: Lower Body Dressing Assistance Socks: Supervision(supine via cross leg technique) Therapeutic Exercises:  
Post mobility with pt and PT, and without seated rest break, pt completed 3/6 cardiac exercises in standing with visual and verbal cues. Reviewed remaining 3 seated and will provide pt with cardiac exercise handout. Pain: No pain Activity Tolerance:  
Good Please refer to the flowsheet for vital signs taken during this treatment. After treatment patient left in no apparent distress:  
Sitting in chair and Call bell within reach COMMUNICATION/COLLABORATION:  
The patients plan of care was discussed with: Physical Therapist and Registered Nurse Linn Saldivar OT Time Calculation: 24 mins

## 2019-11-15 NOTE — PROGRESS NOTES
JESÚS Plan: 
   Patient scheduled for LVAD implant on 11/18/19 Disposition:  TBD - Home Health would be [facility] (LVAD Coordinator will train for services) CM will continue to follow for disposition needs. Patient scheduled for LVAD implant surgery on Monday (11/18/19). Patient remains on Impella support in CCU.  
 
Antoine Santos MPH

## 2019-11-15 NOTE — PROGRESS NOTES
NYHA class IV A/C systolic heart failure Low EF (10%) Inotrope dependent Malnutrition  
LVAD Work-up Epistaxis Hematuria 
  
Pre-Op Plan (11/18/19 implant) : 
1) Platelets a little low - will need to be above 150 before surgery 2) Want Hgb 8.5 or greater 3) Holding off on lines as RHC looked good 4) Start Vanc when we get in the room 5) Make sure bend relief lock is in place 6) Start drips after we finish the inflow cannula 7) Needs colonoscopy 
  
  
Op Plan: 
1) Will not need sternal saw - can go right to incision and dissection (need ANDREA bar's) 2) Dissection:  
            L. The RV rises above level of posterior sternal plate inferiorly (start high) 
            B. The innominate vein is unusually low (can feel for the wires) 
            C. Will likely need to incorporate proximal CABG anastomosis in side-biter                  for outflow graft             N. LIMA to LAD graft is fairly lateral and should be out of the way             E. Will need to get aorta and LV apex out as usual 
            F. Place drive-line (somewhat thin adipose tissue) 3) Cannulation (give heparin): 
            E. Left groin 25 Fr venous (will save the right in case we need RVAD;  
                YWK place triple lumen early) 
            B. Build 10 mm graft side-arm off Impella graft (glue; will need to extend                  axiallary graft back); have 3/8 connector already attached  
4) Implant: 
            E. Remove Impella, de-clot graft (#4 Shakila), back-flush, hook up to bypass  
                [id number], go on bypass (place extra sucker in axillary wound             V. Packs, find apex, and core (remove trabeculations; suture in CO2 and                  pump sucker lines) 
            C. Place (4) 2-0 Ethibond sutures, bring them through inflow cannula sewing  
                 ring, and tie down 
            D. Place (2) 4-0 SH running sutures, bioglue             E. Attach pump, remove packs, and lower into well 
            F. Clamp outflow graft, turn on drips, and turn on pump at 3000 
            G. Measure outflow graft (add 1 cm), side-biter, aortotomy, and outflow graft                  anastomosis             H. Place de-airing stitch and remove clamps  
            I. Come off bypass and up on LVAD as usual 
            J. Staple off axillary graft  
  
Chest / Abdominal CT scan:  
  
Sternum is  although it looks like he still has sternal bone; the right half is higher than the left half and will need to some down; should be able to close with 12 standard wires although the sternum is thin; back-up plan would be a weave  
  
RV is coming above the sternal edge somewhat at the inferior margin so need to be careful here 
  
Proximal comes off anterior portion of the aorta; may need to place side-biter such that it incorporates the proximal; needs a LHC to figure out which way the graft is going; no significant calcium in the ascending aorta; Impella in place  
  
LIMA to LAD graft is fairly lateral so should be out of the way for dissection  
  
Right atrium looks a little big 
  
Innominate vein is a little low so need to be carefull with dissection (should be able to feel the pace leads)  
  Bilateral common iliac arteries are severely calcified and narrowed down into the 4 mm range; will be somewhat hazardous trying to place femoral arterial cannula; will be best to extend out impella graft, build 10 mm side-arm, and go on through the axillary artery; will need to flush out the graft after we pull the Impella and before going on bypass (timing will be important here); will need to place the femoral venous line first 
  
Abdominal wall adipose tissue is thin so need to be careful with drive-line  
  
LHC - pending 
  
TTE - severely dilated LV cavity (7.36 cm) with reduced EF (10%); mild MR, large left atrium; previous TTE's did not reveal any AI; has moderate RV dysfunction (RVIDD 5.4, RV/LV ratio 0.73, TAPSE 1.4, mild TR; good free wall motion); little worried that sternal closure with compress his RV so will be prepare for temp RVAD support  
  
Carotids - normal 
  
ABIs - normal  
  
PFT's - FEV-1 2.25 (59% predicted)  
  
Epistaxis - resolved  
  
Hematuria - resolved  
  
Hgb 8.2, platelets 98, INR 1.1 
  
BUN 15, creatinine 1.13 
  
Bilirubin 1.3, ALT 28, AST 35, alk phos 130 
  
LDH - 438, lactic acid 0.9 
  
NT pro-BNP - 7103  
  
CEA - 6.6 (mildy elevated)  
  
CXR - mild pulmonary edema 
  
Impella - flow 3.5 L/min @ P-6 Looks good this am  
 
Holding off on lines Hgb and platelets a little low Will transfuse over the weekend Creatinine normal 
 
LDH and lactic acid look reasonable NT pro-BNP about the same Intake/Output Summary (Last 24 hours) at 11/15/2019 1118 Last data filed at 11/15/2019 0500 Gross per 24 hour Intake 20 ml Output 920 ml Net -900 ml Visit Vitals BP 98/75 Pulse 76 Temp 98.1 °F (36.7 °C) Resp 21 Ht 6' 2\" (1.88 m) Wt 196 lb 6.9 oz (89.1 kg) SpO2 95% BMI 25.22 kg/m² Risk of morbidity and mortality - high Medical decision making - high complexity Total critical care time - 30 minutes (CPT 71805)

## 2019-11-15 NOTE — PROGRESS NOTES
1930: Bedside report received from Thien Montesinos. 4199: AM labs drawn. 0730: Bedside and Verbal shift change report given to Rayo RN (oncoming nurse) by Titi Magaña RN (offgoing nurse). Report included the following information SBAR, Kardex, Intake/Output, MAR, Accordion and Recent Results.

## 2019-11-15 NOTE — PROGRESS NOTES
[facility] 
 [address] [facility], [address] [facility] Phone: [phone]   [phone]   
  
Nephrology Progress Note Sona Kiser     1950     [id number] Date of Admission : 10/25/2019 
11/15/19 CC:  Follow up for JUAN J, CKD, Hyponatremia Assessment and Plan JUAN J on CKD : 
- 2/2 CRS and resolved and better than baseline  
- start bumex 1mg IV daily today 
- daily labs CKD III at baseline w/ L renal atrophy: - NM scan shows equal function 
- baseline Cr elevated from CRS and urinary retention issues - Cr stable Gross hematuria, BPH w/ retention: 
- off CBI now  
- no issues on cysto 
- ok to remove neal per urology Serratia and Enteroccocus UTI: 
- per ID Hyponatremia: 
- 2/2 CHF  
- resolving Hoarseness, vocal cord paralysis, mediastinal adenopathy: 
- will need eventual PET/CT 
  
Acute on Chronic HFrEF  
- EF 16-20%, NYHA Class IV , hx of VF arrest - s/p AICD 
- Impella insertion 10/29 
- LVAD eval completed - on schedule for 11/18 
  
JOSE on CPAP  
  
PAF s/p DCCV 6/19 
  
Chronic Anemia: 
- from CKD and iron deficiency - s/p IV iron, now on PAVEL 
- hgb stable 
- EGD normal. CSY - Diverticulosis Interval History:   
Seen and examined. Feeling ok. Increasing edema. BP and Cr stable. Voiding ok. Neal in place. No cp, sob, n/v/d reported. Review of Systems: Pertinent items are noted in HPI. Current Medications:  
Current Facility-Administered Medications Medication Dose Route Frequency  sodium chloride (NS) flush 5-40 mL  5-40 mL IntraVENous Q8H  
 sodium chloride (NS) flush 5-40 mL  5-40 mL IntraVENous PRN  
 naloxone (NARCAN) injection 0.4 mg  0.4 mg IntraVENous PRN  
 diphenhydrAMINE (BENADRYL) injection 25 mg  25 mg IntraVENous Q4H PRN  
 oxymetazoline (AFRIN) 0.05 % nasal spray 2 Spray  2 Spray Right Nostril BID PRN  
 epoetin yvonne-epbx (RETACRIT) injection 20,000 Units  20,000 Units SubCUTAneous Q TUE, THU & SAT  bumetanide (BUMEX) injection 1 mg  1 mg IntraVENous Q6H PRN  
 lactobac ac& pc-s.therm-b.anim (TIAN Q/RISAQUAD)  1 Cap Oral DAILY  sodium chloride (NS) flush 5-40 mL  5-40 mL IntraVENous PRN  
 simethicone (MYLICON) 54YX/9.6ZL oral drops 80 mg  1.2 mL Oral Multiple  ceFAZolin (ANCEF) 2 g/20 mL in sterile water IV syringe  2 g IntraVENous Q8H  
 benzocaine-menthol (CEPACOL) lozenge 1 Lozenge  1 Lozenge Mucous Membrane PRN  
 hydrALAZINE (APRESOLINE) tablet 10 mg  10 mg Oral TID  
 0.9% sodium chloride infusion 250 mL  250 mL IntraVENous PRN  
 amiodarone (CORDARONE) tablet 100 mg  100 mg Oral DAILY  dronabinol (MARINOL) capsule 2.5 mg  2.5 mg Oral ACB&D  
 insulin lispro (HUMALOG) injection   SubCUTAneous AC&HS  sodium chloride (OCEAN) 0.65 % nasal squeeze bottle 2 Spray  2 Spray Both Nostrils QID  alteplase (CATHFLO) 1 mg in dextrose 5% 50 mL impella purge solution  1 mg Other TITRATE  pantoprazole (PROTONIX) tablet 40 mg  40 mg Oral ACB  dextrose 5% infusion  4-20 mL/hr IntraVENous CONTINUOUS  
 [Held by provider] bivalirudin (ANGIOMAX) 250 mg in dextrose 5% 250 mL infusion  4-20 mL/hr Other TITRATE  alteplase (CATHFLO) 1 mg in sterile water (preservative free) 1 mL injection  1 mg InterCATHeter PRN  
 bacitracin 500 unit/gram packet 1 Packet  1 Packet Topical PRN  
 sodium chloride (NS) flush 5-40 mL  5-40 mL IntraVENous Q8H  
 sodium chloride (NS) flush 5-40 mL  5-40 mL IntraVENous PRN  
 0.45% sodium chloride infusion  10 mL/hr IntraVENous CONTINUOUS  
 0.9% sodium chloride infusion  10 mL/hr IntraVENous CONTINUOUS  
 oxyCODONE IR (ROXICODONE) tablet 5 mg  5 mg Oral Q4H PRN  
 oxyCODONE IR (ROXICODONE) tablet 10 mg  10 mg Oral Q4H PRN  
 morphine injection 4 mg  4 mg IntraVENous Q2H PRN  
 naloxone (NARCAN) injection 0.4 mg  0.4 mg IntraVENous PRN  
 ondansetron (ZOFRAN) injection 4 mg  4 mg IntraVENous Q4H PRN  
  albuterol (PROVENTIL VENTOLIN) nebulizer solution 2.5 mg  2.5 mg Nebulization Q4H PRN  chlorhexidine (PERIDEX) 0.12 % mouthwash 10 mL  10 mL Oral Q12H  
 magnesium oxide (MAG-OX) tablet 400 mg  400 mg Oral BID  calcium chloride 1 g in 0.9% sodium chloride 250 mL IVPB  1 g IntraVENous PRN  
 bisacodyl (DULCOLAX) suppository 10 mg  10 mg Rectal DAILY PRN  
 senna-docusate (PERICOLACE) 8.6-50 mg per tablet 1 Tab  1 Tab Oral BID  polyethylene glycol (MIRALAX) packet 17 g  17 g Oral DAILY  ELECTROLYTE REPLACEMENT NOTE: Nurse to review Serum Potassium and Magnesuim levels and Initiate Electrolyte Replacement Protocol as needed  1 Each Other PRN  
 magnesium sulfate 1 g/100 ml IVPB (premix or compounded)  1 g IntraVENous PRN  
 glucose chewable tablet 16 g  4 Tab Oral PRN  
 glucagon (GLUCAGEN) injection 1 mg  1 mg IntraMUSCular PRN  
 dextrose 10% infusion 0-250 mL  0-250 mL IntraVENous PRN  
 morphine injection 2 mg  2 mg IntraVENous Q2H PRN  
 melatonin tablet 3 mg  3 mg Oral QHS PRN  
 albumin human 5% (BUMINATE) solution 25 g  25 g IntraVENous Q2H PRN  
 influenza vaccine 2019-20 (6 mos+)(PF) (FLUARIX/FLULAVAL/FLUZONE QUAD) injection 0.5 mL  0.5 mL IntraMUSCular PRIOR TO DISCHARGE  
 [Held by provider] aspirin delayed-release tablet 81 mg  81 mg Oral DAILY  tamsulosin (FLOMAX) capsule 0.8 mg  0.8 mg Oral DAILY  allopurinol (ZYLOPRIM) tablet 100 mg  100 mg Oral DAILY No Known Allergies Objective: 
Vitals:   
Vitals:  
 11/15/19 0300 11/15/19 0400 11/15/19 0500 11/15/19 0600 BP: 107/90 106/88 103/83 (!) 107/91 Pulse: 70 69 70 70 Resp: 20 16 19 20 Temp:  98.1 °F (36.7 °C) SpO2: 95% 97% 98% 98% Weight:  89.1 kg (196 lb 6.9 oz) Height:      
 
Intake and Output: 
No intake/output data recorded. 11/13 1901 - 11/15 0700 In: 100 [I.V.:100] Out: 1575 [[id number]] Physical Examination: 
 
General: No distress Neck:  Supple, no mass Resp:  Lungs few dependent rales CV:  Impella sounds  2+ b/l LE edema GI:  Soft, NT, + Bowel sounds Neurologic:  AOx3, nonfocal exam 
:  Lizama w/ clear urine [x]    High complexity decision making was performed 
[]    Patient is at high-risk of decompensation with multiple organ involvement Lab Data Personally Reviewed: I have reviewed all the pertinent labs, microbiology data and radiology studies during assessment. Recent Labs 11/15/19 
0425 11/14/19 
0425 11/13/19 
0424  134* 133* K 4.1 3.9 3.8  102 99 CO2 28 29 29 GLU 88 84 87 BUN 13 12 12 CREA 1.17 1.28 1.31* CA 8.6 8.5 8.6 MG 2.1 1.9 2.1 ALB 2.4* 2.6* 2.3*  
SGOT 20 22 25 ALT 7* 9* 14 INR 1.1 1.1 1.1 Recent Labs 11/15/19 
0425 11/14/19 
0425 11/13/19 
0424 WBC 3.7* 3.7* 3.8* HGB 8.1* 8.0* 8.5* HCT 27.3* 26.7* 27.5* PLT 90* 82* 105* No results found for: SDES Lab Results Component Value Date/Time Culture result: NO GROWTH 2 DAYS 11/12/2019 11:18 AM  
 Culture result: SERRATIA MARCESCENS (A) 10/31/2019 10:05 AM  
 Culture result: SERRATIA MARCESCENS (2ND COLONY TYPE/STRAIN) (A) 10/31/2019 10:05 AM  
 Culture result: ENTEROCOCCUS FAECALIS GROUP D (A) 10/31/2019 10:05 AM  
 Culture result: NO GROWTH 5 DAYS 10/25/2019 07:14 PM  
 
Recent Results (from the past 24 hour(s)) GLUCOSE, POC Collection Time: 11/14/19 12:59 PM  
Result Value Ref Range Glucose (POC) 118 (H) 65 - 100 mg/dL Performed by Ashtyn Wright GLUCOSE, POC Collection Time: 11/14/19  5:08 PM  
Result Value Ref Range Glucose (POC) 107 (H) 65 - 100 mg/dL Performed by Radha Davila Collection Time: 11/15/19  4:25 AM  
Result Value Ref Range  (H) 85 - 241 U/L  
NT-PRO BNP Collection Time: 11/15/19  4:25 AM  
Result Value Ref Range NT pro-BNP 9,096 (H) <125 PG/ML  
LACTIC ACID Collection Time: 11/15/19  4:25 AM  
Result Value Ref Range  Lactic acid 0.7 0.4 - 2.0 MMOL/L  
 PROTHROMBIN TIME + INR Collection Time: 11/15/19  4:25 AM  
Result Value Ref Range INR 1.1 0.9 - 1.1 Prothrombin time 11.4 (H) 9.0 - 11.1 sec PTT Collection Time: 11/15/19  4:25 AM  
Result Value Ref Range aPTT 28.1 22.1 - 32.0 sec  
 aPTT, therapeutic range     58.0 - 77.0 SECS  
MAGNESIUM Collection Time: 11/15/19  4:25 AM  
Result Value Ref Range Magnesium 2.1 1.6 - 2.4 mg/dL CBC W/O DIFF Collection Time: 11/15/19  4:25 AM  
Result Value Ref Range WBC 3.7 (L) 4.1 - 11.1 K/uL  
 RBC 2.74 (L) 4.10 - 5.70 M/uL HGB 8.1 (L) 12.1 - 17.0 g/dL HCT 27.3 (L) 36.6 - 50.3 % MCV 99.6 (H) 80.0 - 99.0 FL  
 MCH 29.6 26.0 - 34.0 PG  
 MCHC 29.7 (L) 30.0 - 36.5 g/dL RDW 21.7 (H) 11.5 - 14.5 % PLATELET 90 (L) 195 - 400 K/uL MPV 10.1 8.9 - 12.9 FL  
 NRBC 0.0 0  WBC ABSOLUTE NRBC 0.00 0.00 - 0.01 K/uL METABOLIC PANEL, COMPREHENSIVE Collection Time: 11/15/19  4:25 AM  
Result Value Ref Range Sodium 136 136 - 145 mmol/L Potassium 4.1 3.5 - 5.1 mmol/L Chloride 104 97 - 108 mmol/L  
 CO2 28 21 - 32 mmol/L Anion gap 4 (L) 5 - 15 mmol/L Glucose 88 65 - 100 mg/dL BUN 13 6 - 20 MG/DL Creatinine 1.17 0.70 - 1.30 MG/DL  
 BUN/Creatinine ratio 11 (L) 12 - 20 GFR est AA >60 >60 ml/min/1.73m2 GFR est non-AA >60 >60 ml/min/1.73m2 Calcium 8.6 8.5 - 10.1 MG/DL Bilirubin, total 0.7 0.2 - 1.0 MG/DL  
 ALT (SGPT) 7 (L) 12 - 78 U/L  
 AST (SGOT) 20 15 - 37 U/L Alk. phosphatase 121 (H) 45 - 117 U/L Protein, total 5.4 (L) 6.4 - 8.2 g/dL Albumin 2.4 (L) 3.5 - 5.0 g/dL Globulin 3.0 2.0 - 4.0 g/dL A-G Ratio 0.8 (L) 1.1 - 2.2 Total time spent with patient:  xxx   min. Care Plan discussed with: 
Patient Family RN Consulting Physician [facility],      
 
I have reviewed the flowsheets. Chart and Pertinent Notes have been reviewed. No change in PMH ,family and social history from Consult note.  
 
 
Joanne Lance MD

## 2019-11-15 NOTE — PROGRESS NOTES
Cardiac Surgery Care Coordinator-  Met with Marissa Villalba and his family,  reviewed role of the Cardiac Surgery Care Coordinator. Reviewed plan of care and began pre-op education. Discussed day of surgery expectations for the pt and family. Reviewed proper use of the incentive spirometer, he  is able to pull 1000cc with good effort. Reinforced sternal precautions and 5 lb weight restrictions. Encouraged Marissa Villalba and his family to verbalize and offered emotional support.  Sada Alvarado RN

## 2019-11-15 NOTE — PROGRESS NOTES
[address] Patient and Caregiver Education: 
Patient states he is in a bad mood and not willing to participate in education at this time. Completed with Wife Judd Rose), Son Omie Apley) and Daughter-in-law Vianca Cardoso):  Educated using teach back:  vocabulary and functions; emergency situations, placing batteries into clips, power exchanges, MPU functions, and alarms for caregivers. Family verbalized understanding and required verbal and physical cues for power exchanges. Jose Rafael Wagner RN 
VAD Coordinator

## 2019-11-15 NOTE — PROGRESS NOTES
Problem: Mobility Impaired (Adult and Pediatric) Goal: *Acute Goals and Plan of Care (Insert Text) Description FUNCTIONAL STATUS PRIOR TO ADMISSION: Patient was modified independent using a single point cane for functional mobility. Patient reports an increasingly sedentary lifestyle 2* fatigue and SOB/dyspnea. Retired (so is his wife). Patient reports x 3 falls within the last couple of weeks. Patient is wearing either nasal cannula or CPAP at night. LVAD work-up has been initiated. HOME SUPPORT PRIOR TO ADMISSION: The patient lived with his wife, but did not require physical assistance. Physical Therapy Goals Initiated 10/27/2019; updated 11/15/2019 1. Patient will move from supine to sit and sit to supine, scoot up and down and roll side to side in bed with independence within 7 days. 2.  Patient will perform sit to/from stand with supervision/set-up within 7 days. 3.  Patient will ambulate 200 feet with least restrictive assistive device and supervision/set-up within 7 days. 4.  Patient will ascend/descend 4 stairs with  handrail(s) with supervision/set-up within 7 days for functional strengthening and community reintegration. Sudie Mar 5.  Patient will verbally and functionally recall 3/3 sternal precautions within 7 days in preparation for LVAD implantation. 6.  Patient will perform a mock power exchange for power module to/from battery with supervision/set-up within 7 days in preparation for LVAD implantation. 1.  Patient will move from supine to sit and sit to supine, scoot up and down and roll side to side in bed with independence within 7 days. 2.  Patient will perform sit to/from stand with supervision/set-up within 7 days. 3.  Patient will ambulate 150 feet with least restrictive assistive device and supervision/set-up within 7 days.   
4.  Patient will ascend/descend 4 stairs with  handrail(s) with supervision/set-up within 7 days for functional strengthening and community reintegration. Jeanie Lira 5.  Patient will verbally and functionally recall 3/3 sternal precautions within 7 days in preparation for LVAD implantation. 6.  Patient will perform a mock power exchange for power module to/from battery with supervision/set-up within 7 days in preparation for LVAD implantation. Outcome: Progressing Towards Goal 
 
PHYSICAL THERAPY TREATMENT: WEEKLY REASSESSMENT Patient: Donny Shaikh (75 y.o. male) Date: 11/15/2019 Primary Diagnosis: Acute decompensated heart failure ([facility] [address] [I50.9] Procedure(s) (LRB): LEFT AND RIGHT HEART CATH / CORONARY ANGIOGRAPHY (N/A) 2 Days Post-Op Precautions:   Fall(R axillary impella) ASSESSMENT Patient continues with skilled PT services and is progressing towards goals. He remains highly motivated and is making gains in endurance each session. He continues to present with soft knee extension and tremors that increase with fatigue. No overt buckling but gait required very close CGA. Gait quality improved overall today after cuing for erect posture and full knee extension during stance phase. He is scheduled for an LVAD next Monday and will follow up as indicated post-operatively. Patient's progression toward goals since last assessment: patient met his initial gait goals, working towards tranfers without UE use in prep for a sternotomy PLAN : 
Goals have been updated based on progression since last assessment. Patient continues to benefit from skilled intervention to address the above impairments. Recommendations and Planned Interventions: bed mobility training, transfer training, gait training and therapeutic exercises Frequency/Duration: Patient will be followed by physical therapy:  5 times a week to address goals. Recommendation for discharge: (in order for the patient to meet his/her long term goals) To be determined:    
 
 
 
IF patient discharges home will need the following DME: to be determined (TBD) SUBJECTIVE:  
Patient stated I feel great today.  OBJECTIVE DATA SUMMARY:  
HISTORY:   
Past Medical History:  
Diagnosis Date Degenerative disc disease, lumbar Heart failure ([facility] [address] High cholesterol Hypertension Paroxysmal atrial fibrillation ([facility] [address] 4/2/2019 Spinal stenosis Past Surgical History:  
Procedure Laterality Date COLONOSCOPY Left 11/11/2019 COLONOSCOPY at bedside performed by Angie Payan MD at [address] [facility] HX CORONARY ARTERY BYPASS GRAFT    
 triple HX HERNIA REPAIR    
 HX IMPLANTABLE CARDIOVERTER DEFIBRILLATOR    
 ID CARDIOVERSION ELECTIVE ARRHYTHMIA EXTERNAL N/A 6/10/2019 EP CARDIOVERSION performed by Hilda Carey MD at [address], [facility] CATH LAB  
 ID CARDIOVERSION ELECTIVE ARRHYTHMIA EXTERNAL N/A 6/18/2019 EP CARDIOVERSION performed by Monika Ibarra MD at [address] [address], [facility] CATH LAB  
 ID INSJ/RPLCMT PERM DFB W/TRNSVNS LDS 1/DUAL [facility] N/A 6/21/2019 INSERT ICD BIV MULTI performed by Jv Morris MD at [address] [address], [facility] CATH LAB  
 ID TCAT IMPL WRLS P-ART PRS SNR L-T HEMODYN MNTR N/A 9/18/2019 IMPLANT HEART FAILURE MONITORING DEVICE performed by Refugio Melissa MD at [address] [address], [facility] CATH LAB Personal factors and/or comorbidities impacting plan of care:  
 
Home Situation Home Environment: Private residence # Steps to Enter: 0 One/Two Story Residence: One story Living Alone: No 
Support Systems: Spouse/Significant Other/Partner Patient Expects to be Discharged to[de-identified] Unknown Current DME Used/Available at Home: Cane, straight, Walker, rolling, CPAP Tub or Shower Type: Shower EXAMINATION/PRESENTATION/DECISION MAKING:  
Critical Behavior: 
Neurologic State: Appropriate for age Orientation Level: Oriented X4 Cognition: Appropriate decision making, Appropriate for age attention/concentration Safety/Judgement: Insight into deficits Hearing: Auditory Auditory Impairment: None Skin:   
Edema: Range Of Motion: 
AROM: Generally decreased, functional 
  
  
  
PROM: Generally decreased, functional 
  
  
  
Strength:   
Strength: Within functional limits Tone & Sensation:  
Tone: Normal 
  
  
  
  
Sensation: Intact Coordination: 
Coordination: Within functional limits Vision:  
  
Functional Mobility: 
Bed Mobility: 
  
Supine to Sit: Supervision Sit to Supine: Contact guard assistance Scooting: Contact guard assistance Transfers: 
Sit to Stand: Minimum assistance Stand to Sit: Contact guard assistance Balance:  
Sitting: Intact Standing: Impaired; With support Standing - Static: Good Standing - Dynamic : Fair Ambulation/Gait Training: 
Distance (ft): 240 Feet (ft)(100' seated rest and 140' on 2nd attempt) Assistive Device: Gait belt;Walker, rolling Ambulation - Level of Assistance: Contact guard assistance Gait Abnormalities: Decreased step clearance Activity Tolerance:  
Good Please refer to the flowsheet for vital signs taken during this treatment. After treatment patient left in no apparent distress:  
Sitting in chair and Call bell within reach COMMUNICATION/EDUCATION:  
The patients plan of care was discussed with: Occupational Therapist and Registered Nurse. Fall prevention education was provided and the patient/caregiver indicated understanding., Patient/family have participated as able in goal setting and plan of care. and Patient/family agree to work toward stated goals and plan of care. Thank you for this referral. 
David High, PT, DPT Time Calculation: 35 mins

## 2019-11-15 NOTE — PROGRESS NOTES
ID Progress Note 11/15/2019 Subjective: No specific complaints today--seems to be doing ok Objective: Antibiotics: 1. Cefepime Vitals:  
Visit Vitals BP 90/73 (BP 1 Location: Left arm, BP Patient Position: At rest) Pulse 80 Temp 98.5 °F (36.9 °C) Resp 24 Ht 6' 2\" (1.88 m) Wt 89.1 kg (196 lb 6.9 oz) SpO2 94% BMI 25.22 kg/m² Tmax:  Temp (24hrs), Av.2 °F (36.8 °C), Min:98 °F (36.7 °C), Max:98.5 °F (36.9 °C) Exam:  Lungs:  clear to auscultation bilaterally Heart:  regular rate and rhythm Abdomen:  soft, non-tender. Bowel sounds normal. No masses,  no organomegaly Grossly bloody urine in catheter Labs:     
Recent Labs 11/15/19 
0425 19 
0425 19 
0424 WBC 3.7* 3.7* 3.8* HGB 8.1* 8.0* 8.5* PLT 90* 82* 105* BUN 13 12 12 CREA 1.17 1.28 1.31* SGOT 20 22 25 * 121* 125* TBILI 0.7 0.9 0.8 Cultures: No results found for: DANIELLE Lab Results Component Value Date/Time Culture result: NO GROWTH 3 DAYS 2019 11:18 AM  
 Culture result: SERRATIA MARCESCENS (A) 10/31/2019 10:05 AM  
 Culture result: SERRATIA MARCESCENS (2ND COLONY TYPE/STRAIN) (A) 10/31/2019 10:05 AM  
 Culture result: ENTEROCOCCUS FAECALIS GROUP D (A) 10/31/2019 10:05 AM  
 
 
Radiology:  
 
Line/Insert Date:        
 
Assessment:  
 
1. UTI--Serratia (2 biotypes) from culture and small amount of Enterococcus 2. CHF/cardiomyopathy Objective: 1. Continue current therapy 2. LVAD Monday 3. Group will see over the weekend if asked Dionicio Montemayor MD

## 2019-11-15 NOTE — DIABETES MGMT
[facility] Impella Progress Note Recommendations/ Comments: Chart reviewed for BG control - in range; has required no correctional lispro for past 8 dyas Current hospital DM medications Lispro normal sensitivity correction scale Chart reviewed on Sona Kiser. Patient is 71 y.o. male s/p Impella   No hx DM. A1c indicative of dx of diabetes. LVAD work up complete - on schedule for 11/18/2019 JUAN J on CKD DTC will follow as needed A1c:  
Lab Results Component Value Date/Time Hemoglobin A1c 6.6 (H) 10/25/2019 02:56 PM  
 
 
 
Recent Glucose Results:  
Lab Results Component Value Date/Time GLU 88 11/15/2019 04:25 AM  
 GLUCPOC 107 (H) 11/14/2019 05:08 PM  
 GLUCPOC 118 (H) 11/14/2019 12:59 PM  
  
 
Lab Results Component Value Date/Time Creatinine 1.17 11/15/2019 04:25 AM  
 
Estimated Creatinine Clearance: 69.3 mL/min (based on SCr of 1.17 mg/dL). Active Orders Diet DIET CARDIAC Regular; 2 GM NA (House Low NA) PO intake:  
No data found. Will continue to follow as needed. Thank you. Donnie Mcgee RN, CDE Time spent: 3 minutes

## 2019-11-15 NOTE — INTERDISCIPLINARY ROUNDS
IDR/SLIDR Summary Patient: Aydee Diaz MRN: [id number]    Age: 71 y.o. YOB: 1950 Room/Bed: [address] [place] [address] Admit Diagnosis: Acute decompensated heart failure (HCC) [I50.9]  Principal Diagnosis: <principal problem not specified>  
Goals: LVAD w/u Readmission: NO  Quality Measure: CHF VTE Prophylaxis: Mechanical 
Influenza Vaccine screening completed? YES Pneumococcal Vaccine screening completed? NO Mobility needs: Yes   Nutrition plan:Yes 
Consults:P.T, O.T. and Case Management Financial concerns:Yes  Escalated to CM? YES 
RRAT Score:    Interventions:H2H Testing due for pt today? YES 
LOS: 20 days Expected length of stay ? days Discharge plan: tbd   PCP: Kristi Boo MD 
Transportation needs: Yes Days before discharge:two or more days before discharge Discharge disposition: tbd Signed:  
 
Hari Schaefer 11/14/2019 
2:08 AM

## 2019-11-15 NOTE — PROGRESS NOTES
[facility] ICU Progress Note Admit Date: 10/25/2019 POD: 14 Day Post-Op Procedure:  Procedure(s): RIGHT AXILLARY IMPELLA INSERTION Subjective:  
Pt seen with Dr. Mayank Holley. No new complaints. Impella P8, D5 purge. Objective:  
Vitals: 
Blood pressure (!) 107/91, pulse 70, temperature 98.1 °F (36.7 °C), resp. rate 20, height 6' 2\" (1.88 m), weight 196 lb 6.9 oz (89.1 kg), SpO2 98 %. Temp (24hrs), Av.2 °F (36.8 °C), Min:98 °F (36.7 °C), Max:98.9 °F (37.2 °C) Hemodynamics: 
 CO: CO (l/min): 8.3 l/min CI: CI (l/min/m2): 4 l/min/m2 CVP: CVP (mmHg): 10 mmHg (19 1600) SVR: SVR (dyne*sec)/cm5: 781 (dyne*sec)/cm5 (19 1500) PAP Systolic: PAP Systolic: 45 (96 7540) PAP Diastolic: PAP Diastolic: 19 ( 3639) PVR:   
 SV02: SVO2 (%): 51 % (19 1500) SCV02: SCVO2 (%): 75 % (10/29/19 1900) EKG/Rhythm:   
Paced in the 90s Oxygen Therapy: 
Oxygen Therapy O2 Sat (%): 98 % (11/15/19 0600) Pulse via Oximetry: 83 beats per minute (19 1500) O2 Device: Room air (19) O2 Flow Rate (L/min): 2 l/min (19 0800) FIO2 (%): 40 % (10/30/19 0846) CXR:  
CXR Results  (Last 48 hours)  
          
 11/15/19 0458  XR CHEST PORT Final result Impression:  IMPRESSION:  
No significant interval change. Narrative:  PORTABLE CHEST RADIOGRAPH/S: 11/15/2019 4:58 AM  
   
Clinical history: Postoperative heart INDICATION:   postop heart COMPARISON: 2019 FINDINGS:  
AP portable upright view of the chest demonstrates a stable  cardiopericardial  
silhouette. The lungs are adequately expanded. Cardiac assist device and PICC  
line catheter unchanged. Minimal interstitial edema unchanged. Cardiac pacer as  
well. . The osseous structures are unremarkable. Patient is on a cardiac monitor. 19 0523  XR CHEST PORT Final result Impression:  IMPRESSION:  
No significant interval change. Narrative:  PORTABLE CHEST RADIOGRAPH/S: 2019 5:27 AM  
   
Clinical history: Postoperative heart INDICATION:   postop heart COMPARISON: 2019 FINDINGS:  
AP portable upright view of the chest demonstrates a stable  cardiopericardial  
silhouette. The lungs are adequately expanded. Cardiac assist device and PICC  
line catheter unchanged. Minimal interstitial edema unchanged. Cardiac pacer as  
well. . The osseous structures are unremarkable. Patient is on a cardiac monitor. Admission Weight: Last Weight Weight: 192 lb 10.9 oz (87.4 kg) Weight: 196 lb 6.9 oz (89.1 kg) Intake / Output / Drain: 
Current Shift: No intake/output data recorded. Last 24 hrs.:  
 
Intake/Output Summary (Last 24 hours) at 11/15/2019 0744 Last data filed at 11/15/2019 0500 Gross per 24 hour Intake 100 ml Output 1030 ml Net -930 ml EXAM: 
General:  NAD. Up in the chair Lungs:   Diminished in the bases. Incision:  Impella dressing C/D/I Heart:  Regular rate and rhythm, S1, S2 normal, no murmur, click, rub or gallop. Abdomen:   Soft, non-tender. Bowel sounds hypoactive. No masses,  No organomegaly. Extremities:  +2 bilateral lower extremity pitting edema. PPP. Neurologic:  Gross motor and sensory apparatus intact. Labs:  
Recent Labs 11/15/19 
0425 19 
1708 WBC 3.7*  --   
HGB 8.1*  --   
HCT 27.3*  --   
PLT 90*  --   
  --   
K 4.1  --   
BUN 13  --   
CREA 1.17  --   
GLU 88  --   
GLUCPOC  --  107* INR 1.1  -- Assessment:  
 
Active Problems: 
  Acute decompensated heart failure ([facility] [address] (10/25/2019) Plan/Recommendations/Medical Decision Makin. Acute on chronic systolic (CHF Class IV) S/P Impella: Has ICD. LV EF 16-20%. No BB/ACE/ARB/AA until appropriate. Holding diuresis per AHFS. Trend proBNP, lactate, LDH. Strict I/Os. Daily weights. LVAD w/u in process - looking towards 11/18/19. On Ancef for ppx. Continue D5 purge due to hematuria/epistaxis 2. Thrombocytopenia: Platelets at 316C today. No asa. HIT negative, LOAN negative. On protonix. Heme following. 3. CAD S/p CABG 2010: Needs C today w/ Dr. Long Pod. Stopped asa d/t hematuria/thrombocytopenia, not on statin historically. No BB d/t lower BP, on hydralazine but may need to hold 4. PAF s/p DCCV 6/2019: Holding eliquis due to hematuria/epistaxis. On PO amio. 5. JUAN J on CKD stage IV: Monitor. Renal following. Diuretics PRN (currently holding) CVP >10. Keep neal. 6. Hx of urinary retention/BPH, hematuria: On flomax. Neal in place, CBI stopped since urine clear. Urology ok to DC neal. Cystoscopy clear. 7. JOSE: qhs CPAP. 8. Hx of sternal wound infection requring sternectomy: Not a contraindication for future LVAD per Dr. Greg Mendoza. Tagged WBC -- showed no abnormal tracer uptake. 9. Iron def anemia: s/p venofer. H&H stable. On Epogen. Cannot use doxy/octreotide d/t prolonged QTC. Prev +occult stool. EGD/colonoscopy 11/11/19 neg for any acute process/abnormality, only revealed diverticulitis 10. Vocal cord paralysis: Voice improving. Speech eval PRN. Advance diet as tolerated. 11. Constipation: Resolved. Continue pericolace, miralax (pt keeps refusing). Continue daily suppository PRN. 12. Serratia UTI/hematuria: completed Zosyn. Monitor 13. Mediastinal lymphadenopathy:  Per AHF, low threshold for Carcinoma per CT scans. Eventually needs PET scan. 14. Hyponatremia: Resolved-monitor 15. Acute Moderate protein-calorie malnutrition:  Pre-albumin 10/25 was 13.9. Repeat 19.4. Pt eating cardiac diet well. Continue marinol Dispo: Care and orders per AHFS. Remain in CCU. Plan for LVAD 11-18-19.  Transfuse to get platelets above 846 and Hemoglobin > 8 over the weekend to prepare for surgery Monday. Signed By: MALCOLM Zelaya Saw patient, agree with above Risk of morbidity and mortality - high Medical decision making - high complexity 1. Acute on chronic systolic (CHF Class IV) S/P Impella:  
 
2. Thrombocytopenia: Platelets improving at 98K. No asa. HIT negative, LOAN negative. On protonix. Heme following. 3. CAD S/p CABG 2010: Needs LHC, timing TBD, once UTI has cleared. Stop asa d/t hematuria/thrombocytopenia, not on statin historically. No BB D/t pressors/intropes. 4. PAF s/p DCCV 6/2019: Holding eliquis due to hematuria/epistaxis. On PO amio. 5. JUAN J on CKD stage IV: Monitor. Renal following. Diuretics PRN CVP >10. Keep neal. 6. Hx of urinary retention/BPH, hematuria:  On flomax. Neal with CBI per Urology but will likely wean off. Urology following. 7. JOSE: qhs CPAP. 8. Hx of sternal wound infection requring sternectomy: Not a contraindication for future LVAD. Tagged WBC -- showed no abnormal tracer uptake. 9. Iron def anemia: s/p venofer. H&H stable today. On Epogen. Cannot use doxy/octreotide d/t prolonged QTC. Occult blood in stool positive per POC testing. Gastroenterology following for LVAD work up. 10. Vocal cord paralysis: Voice improving. Speech eval PRN. Advance diet as tolerated. 11. Constipation: Resolved last BM 11/6. Continue pericolace, miralax(pt keeps refusing). Continue daily suppository PRN. 12. Serratia UTI/hematuria: Urology following planning to transition off CBI. On Zosyn-ID following. 13. Mediastinal lymphadenopathy:  Per AHF, low threshold for Carcinoma per CT scans. Eventually needs PET scan. 14. Hyponatremia: Resolved-monitor 15. Acute Moderate protein-calorie malnutrition:  Pre-albumin 10/25 was 13.9. Recheck 11/4 was 16.2. Pt eating cardiac diet well. Continue marinol

## 2019-11-16 NOTE — PROGRESS NOTES
Problem: Falls - Risk of 
Goal: *Absence of Falls Description Document [place] Roberto Fall Risk and appropriate interventions in the flowsheet. Outcome: Progressing Towards Goal 
Note:  
Fall Risk Interventions: 
Mobility Interventions: Patient to call before getting OOB Mentation Interventions: Adequate sleep, hydration, pain control Medication Interventions: Patient to call before getting OOB Elimination Interventions: Call light in reach History of Falls Interventions: Evaluate medications/consider consulting pharmacy Problem: Patient Education: Go to Patient Education Activity Goal: Patient/Family Education Outcome: Progressing Towards Goal 
  
Problem: Pain Goal: *Control of Pain Outcome: Progressing Towards Goal 
Goal: *PALLIATIVE CARE:  Alleviation of Pain Outcome: Progressing Towards Goal 
  
Problem: Patient Education: Go to Patient Education Activity Goal: Patient/Family Education Outcome: Progressing Towards Goal 
  
Problem: Pressure Injury - Risk of 
Goal: *Prevention of pressure injury Description Document Mich Scale and appropriate interventions in the flowsheet. Outcome: Progressing Towards Goal 
Note:  
Pressure Injury Interventions: 
Sensory Interventions: Discuss PT/OT consult with provider Moisture Interventions: Absorbent underpads Activity Interventions: Assess need for specialty bed Mobility Interventions: Assess need for specialty bed Nutrition Interventions: Document food/fluid/supplement intake Friction and Shear Interventions: Apply protective barrier, creams and emollients Problem: Patient Education: Go to Patient Education Activity Goal: Patient/Family Education Outcome: Progressing Towards Goal 
  
Problem: Infection - Risk of, Multi-drug Resistant Organism Colonization (MDRO) Goal: *Absence of MDRO colonization Outcome: Progressing Towards Goal 
Goal: *Absence of infection signs and symptoms Outcome: Progressing Towards Goal 
  
Problem: Patient Education: Go to Patient Education Activity Goal: Patient/Family Education Outcome: Progressing Towards Goal 
  
Problem: Heart Failure: Discharge Outcomes Goal: *Demonstrates ability to perform prescribed activity without shortness of breath or discomfort Outcome: Progressing Towards Goal 
Goal: *Left ventricular function assessment completed prior to or during stay, or planned for post-discharge Outcome: Progressing Towards Goal 
Goal: *ACEI prescribed if LVEF less than 40% and no contraindications or ARB prescribed Outcome: Progressing Towards Goal 
Goal: *Verbalizes understanding and describes prescribed diet Outcome: Progressing Towards Goal 
Goal: *Verbalizes understanding/describes prescribed medications Outcome: Progressing Towards Goal 
Goal: *Describes available resources and support systems Description 
(eg: Home Health, Palliative Care, Advanced Medical Directive) Outcome: Progressing Towards Goal 
Goal: *Describes smoking cessation resources Outcome: Progressing Towards Goal 
Goal: *Understands and describes signs and symptoms to report to providers(Stroke Metric) Outcome: Progressing Towards Goal 
Goal: *Describes/verbalizes understanding of follow-up/return appt Description 
(eg: to physicians, diabetes treatment coordinator, and other resources Outcome: Progressing Towards Goal 
Goal: *Describes importance of continuing daily weights and changes to report to physician Outcome: Progressing Towards Goal 
  
Problem: Diabetes Self-Management Goal: *Disease process and treatment process Description Define diabetes and identify own type of diabetes; list 3 options for treating diabetes. Outcome: Progressing Towards Goal 
Goal: *Incorporating nutritional management into lifestyle Description Describe effect of type, amount and timing of food on blood glucose; list 3 methods for planning meals.  
Outcome: Progressing Towards Goal 
 Goal: *Incorporating physical activity into lifestyle Description State effect of exercise on blood glucose levels. Outcome: Progressing Towards Goal 
Goal: *Developing strategies to promote health/change behavior Description Define the ABC's of diabetes; identify appropriate screenings, schedule and personal plan for screenings. Outcome: Progressing Towards Goal 
Goal: *Using medications safely Description State effect of diabetes medications on diabetes; name diabetes medication taking, action and side effects. Outcome: Progressing Towards Goal 
Goal: *Monitoring blood glucose, interpreting and using results Description Identify recommended blood glucose targets  and personal targets. Outcome: Progressing Towards Goal 
Goal: *Prevention, detection, treatment of acute complications Description List symptoms of hyper- and hypoglycemia; describe how to treat low blood sugar and actions for lowering  high blood glucose level. Outcome: Progressing Towards Goal 
Goal: *Prevention, detection and treatment of chronic complications Description Define the natural course of diabetes and describe the relationship of blood glucose levels to long term complications of diabetes. Outcome: Progressing Towards Goal 
Goal: *Developing strategies to address psychosocial issues Description Describe feelings about living with diabetes; identify support needed and support network Outcome: Progressing Towards Goal 
Goal: *Insulin pump training Outcome: Progressing Towards Goal 
Goal: *Sick day guidelines Outcome: Progressing Towards Goal 
Goal: *Patient Specific Goal (EDIT GOAL, INSERT TEXT) Outcome: Progressing Towards Goal 
  
Problem: Patient Education: Go to Patient Education Activity Goal: Patient/Family Education Outcome: Progressing Towards Goal 
  
Problem: Patient Education: Go to Patient Education Activity Goal: Patient/Family Education Outcome: Progressing Towards Goal 
  
 Problem: Discharge Planning Goal: *Discharge to safe environment Outcome: Progressing Towards Goal 
  
Problem: Patient Education: Go to Patient Education Activity Goal: Patient/Family Education Outcome: Progressing Towards Goal 
  
Problem: Nutrition Deficit Goal: *Optimize nutritional status Outcome: Progressing Towards Goal 
  
Problem: Nutrition Deficit Goal: *Optimize nutritional status Outcome: Progressing Towards Goal 
  
Problem: Nutrition Deficit Goal: *Optimize nutritional status Outcome: Progressing Towards Goal 
  
Problem: Infection - Risk of, Urinary Catheter-Associated Urinary Tract Infection Goal: *Absence of infection signs and symptoms Outcome: Progressing Towards Goal 
  
Problem: Patient Education: Go to Patient Education Activity Goal: Patient/Family Education Outcome: Progressing Towards Goal

## 2019-11-16 NOTE — INTERDISCIPLINARY ROUNDS
IDR/SLIDR Summary   
  
  
  
Patient: Prince Carias     MRN: [id number]    Age: 71 y.o. YOB: 1950   Room/Bed: [address] [place] [address] Admit Diagnosis: Acute decompensated heart failure (HCC) [I50.9]            Principal Diagnosis: <principal problem not specified>  
Goals: LVAD w/u Readmission: NO        Quality Measure: CHF VTE Prophylaxis: Mechanical 
Influenza Vaccine screening completed? YES Pneumococcal Vaccine screening completed? NO Mobility needs: Yes                             Nutrition plan:Yes 
Consults:P.T, O.T. and Case Management Financial concerns: Yes                      Escalated to CM? YES 
RRAT Score:                           Interventions:H2H Testing due for pt today? YES 
LOS: 20 days  Expected length of stay ? days Discharge plan: tbd                             PCP: Jen Lan MD 
Transportation needs: Yes Days before discharge:two or more days before discharge Discharge disposition: tbd 
  
Signed:  
  
 
11/14/2019 
2:08 AM

## 2019-11-16 NOTE — PROGRESS NOTES
[address] [facility] 904 [facility] Inpatient Progress Note Patient name: Eliz Benavidez Patient : 1950 Patient MRN: [id number] Attending MD: Jennifer Mitchell MD 
Date of service: 19 CHIEF COMPLAINT: 
Cardiogenic shock HPI:  
Eliz Benavidez is a 71y.o. year old pleasant white male with a history of HTN, HLD, JOSE, CAD s/p cardiac arrest VFib s/p CABG () c/b sternal would infection and sternectomy, ischemic cardiomyopathy LVEF 15-20%, s/p ICD and with LBBB. He was admitted with acute on chronic systolic heart failure with massive volume overload > 20 lbs, in the setting of atrial fibrillation s/p failed DCCV and underwent DCCV and RHC on . S/p BiVICD on 19 with Dr. Meridith Koyanagi. He was discharged home home on IV milrinone on 19. He has been followed closely by Dr. Ramon Ceballos and the [facility]. Mr. Brina Hassan was admitted for acute on chronic systolic heart failure. He underwent implantation of Impella 5.0 due to CS and has completed his LVAD evaluation. He meets criteria for implant of HM3 as DT. He was NYHA Class IV prior to implant of Impella 5.0, has LVEF < 15%, was intolerant of GDMT due to symptomatic hypotension and renal dysfunction. He remains dependent on temporary MCS support. RHC  revealed compensated hemodynamics on Impella. His renal function has improved dramatically with improvement in his hemodynamics. He is not a suitable transplant candidate due to single kidney, sternectomy, debility, and frailty. He was reviewed by [facility] and felt to be a high risk heart transplant candidate due to multiple co-morbidities as well as the afore mentioned conditions. He remains in the CCU undergoing preparation for HM 3 implant as DT on . Interval history: Dyspneic this AM  
Plts 98k Multiple runs of TPA via Impella d/t low purge flows Impression/Plan: Chronic systolic heart failure due to ICM, NYHA Class IV, cardiogenic shock on Impella 5.0 support Continue current impella speed at P8; cannot tolerate lower speeds Impella dislodged and repositioned, continue cefazolin indefinitely due to risk of infection Intermittent TPA via purge fluid per CSS Intolerant of AC due to hematuria, epistaxis [address] 11/13 shows excellent CI, normal filling pressures Goal CVP 10-12 mmHg - D/C PICC after transfusions today Increase Bumex to 1 mg IV BID Continue CAROL hose to mobilize LE fluid No ACE/ARB/ARNi in anticipation of surgery No AA due to hyponatremia and upcoming surgery LVAD evaluation complete - Continue Vitamin K daily x 3  
 - Consents to be signed over the weekend - Blood ordered - [employer] rep notified of implant date CKD 3, atrophic left kidney Appreciate Nephrology assistance Cr markedly improved with Impella support Continue current support at P-8 Increase Bumex to 1 mg IV BID Avoid nephrotoxins Trend labs CAD s/p CABG Intolerant of ASA due to thrombocytopenia No BB due to symptomatic hypotension Hold statin due to recent hepatic failure LHC performed 11/13 - low likelihood of benefit from revascularization Hx of VF arrest s/p AICD No recent shocks Keep K > 4 and Mg > 2 Hypertension, goal SBP < 120 mmHg Hydralazine 10 mg PO TID  
 
PAF Currently in NSR Intolerant to [facility] due to hematuria Continue amiodarone 100 mg PO daily Urinary retention, c/b serratia UTI and hematuria Appreciate Urology consult Cystoscopy performed 11/13 shows catheter induced cystitis Hold AC for now Malnutrition Appreciate Nutritionist consult Prealbumin improved to 19 Continue Marinol 2.5 mg PO BID and six small meals daily COPD Appreciate Pulmonology assistance Continue nebs PRN On RA  
PFTs complete - unable to perform DLCO due to Impella Anemia Multifactorial, due to hemolysis + epistaxis + hematuria Intolerant of octreotide or doxycycline for AVM ppx due to prolonged QTc Diverticulosis Noted on colonoscopy 11/11 Monitor 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 Will ask ID to provide recommendations for treatment pre-implant Hx of sternal wound infection, sternectomy Dr. Shelley Sandoval has reviewed CTs, has surgical plan in place Vocal cord paralysis Improved Debility Continue PT/OT Appreciate assistance Ppx Protonix SCDs PT/OT  
PICC placed 6/25/19 CARDIAC IMAGING: 
Tagged WBC negative 
  INTERVAL HISTORY: 
Cystoscopy, LHC complete Cr trending up slightly Denies complaints PHYSICAL EXAM: 
Visit Vitals BP 93/75 Pulse 81 Temp 97.9 °F (36.6 °C) Resp 25 Ht 6' 2\" (1.88 m) Wt 201 lb 15.1 oz (91.6 kg) SpO2 98% BMI 25.93 kg/m² Impella (5.0) Performance Level (P Level): 8 Flow Rate L/min (0-2.5): 3.8 L/min Placement Signal (mmHg): Differential 5.0 (s/d 30-60/0 ex) Placement Signal (Systolic/Diastolic): 08/36 Motor Current (amp): (normal) Motor Current (Systolic/Diastolic): 156/597 Placement Monitoring: OK Purge Pressure (300-1100 mm Hg): 681 Purge Flow (ml/hr): 3.7 Sidearm Pressure Bag @ 300 mmHg/ flushed (Na with 5.0 Impella): Yes Power (AC/Battery): Yes Impella Pump Serial Number: [id number] Hemodynamics: 
 CVP: CVP (mmHg): 6 mmHg (11/16/19 1200) Review of Systems Constitutional: Negative for chills, fever and malaise/fatigue. HENT: Positive for hearing loss. Respiratory: Negative for cough and shortness of breath. Cardiovascular: Negative for chest pain, palpitations, leg swelling and PND. Gastrointestinal: Negative for heartburn, nausea and vomiting. Appetite greatly improved Musculoskeletal: Negative. Neurological: Negative for dizziness, focal weakness and headaches. Psychiatric/Behavioral: Negative.    
 
 
Physical Exam  
Constitutional: He is oriented to person, place, and time and well-developed, well-nourished, and in no distress. No distress. HENT:  
Head: Normocephalic. Neck: Normal range of motion. Neck supple. No JVD present. Cardiovascular: Normal rate, regular rhythm, normal heart sounds and intact distal pulses. Pulmonary/Chest: Effort normal and breath sounds normal. No respiratory distress. Abdominal: Soft. Bowel sounds are normal. He exhibits no distension. Musculoskeletal: Normal range of motion. He exhibits no edema. Neurological: He is alert and oriented to person, place, and time. Skin: Skin is warm and dry. No erythema. Psychiatric: Affect and judgment normal.  
Nursing note and vitals reviewed. PAST MEDICAL HISTORY: 
Past Medical History:  
Diagnosis Date  Degenerative disc disease, lumbar  Heart failure ([address]  High cholesterol  Hypertension  Paroxysmal atrial fibrillation ([facility] 4/2/2019  Spinal stenosis PAST SURGICAL HISTORY: 
Past Surgical History:  
Procedure Laterality Date  COLONOSCOPY Left 11/11/2019 COLONOSCOPY at bedside performed by Milena [facility]oncalves [facility] at [facility] [address]  HX CORONARY ARTERY BYPASS GRAFT    
 triple  HX HERNIA REPAIR    
 HX IMPLANTABLE CARDIOVERTER DEFIBRILLATOR  OH CARDIOVERSION ELECTIVE ARRHYTHMIA EXTERNAL N/A 6/10/2019 EP CARDIOVERSION performed by Eris Park MD at [place] [address], [facility] CATH LAB  OH CARDIOVERSION ELECTIVE ARRHYTHMIA EXTERNAL N/A 6/18/2019 EP CARDIOVERSION performed by Debby Mosqueda MD at [place] [address], [facility] CATH LAB  OH INSJ/RPLCMT PERM DFB W/TRNSVNS LDS 1/DUAL CHMBR N/A 6/21/2019 INSERT ICD BIV MULTI [facility] [place] [address] CATH LAB  OH TCAT IMPL WRLS P-ART PRS SNR L-T HEMODYN MNTR N/A 9/18/2019 IMPLANT HEART FAILURE MONITORING DEVICE performed by Lydia Chen MD at [place] [address], [facility] CATH LAB FAMILY HISTORY: 
Family History Problem Relation Age of Onset  Lung Disease Mother  Hypertension Mother Canseco Arthritis-osteo Mother  Heart Disease Mother  Heart Disease Father  Diabetes Father SOCIAL HISTORY: 
Social History Socioeconomic History  Marital status:  Spouse name: Not on file  Number of children: Not on file  Years of education: Not on file  Highest education level: Not on file Tobacco Use  Smoking status: Former Smoker Last attempt to quit: 2010 Years since quittin.9  Smokeless tobacco: Never Used Substance and Sexual Activity  Alcohol use: Not Currently Comment: rarely  Drug use: Never Other Topics Concern LABORATORY RESULTS: 
  
Labs Latest Ref Rng & Units 2019 11/15/2019 2019 2019 2019 2019 11/10/2019 WBC 4.1 - 11.1 K/uL 3.7(L) 3. 7(L) 3. 7(L) 3.8(L) 3.8(L) 4.4 4.8  
RBC 4.10 - 5.70 M/uL 2.88(L) 2.74(L) 2.69(L) 2.86(L) 2.95(L) 2.86(L) 2.89(L) Hemoglobin 12.1 - 17.0 g/dL 8.7(L) 8. 1(L) 8.0(L) 8.5(L) 8.7(L) 8.5(L) 8.6(L) Hematocrit 36.6 - 50.3 % 28. 5(L) 27. 3(L) 26. 7(L) 27. 5(L) 28. 1(L) 27. 5(L) 28. 0(L) MCV 80.0 - 99.0 FL 99.0 99. 6(H) 99. 3(H) 96.2 95.3 96.2 96.9 Platelets 586 - 885 K/uL 98(L) 90(L) 82(L) 105(L) 111(L) 93(L) 105(L) Lymphocytes 12 - 49 % - - - - - - - Monocytes 5 - 13 % - - - - - - - Eosinophils 0 - 7 % - - - - - - - Basophils 0 - 1 % - - - - - - - Albumin 3.5 - 5.0 g/dL 2. 5(L) 2. 4(L) 2. 6(L) 2. 3(L) 2. 4(L) 2. 2(L) 2. 3(L) Calcium 8.5 - 10.1 MG/DL 8.4(L) 8.6 8.5 8.6 8.9 8.6 8.5 SGOT 15 - 37 U/L 21 20 22 25 27 28 29 Glucose 65 - 100 mg/dL 77 88 84 87 86 76 89 BUN 6 - 20 MG/DL 12 13 12 12 13 13 16 Creatinine 0.70 - 1.30 MG/DL 1.15 1.17 1.28 1.31(H) 1.25 0.97 1.25 Sodium 136 - 145 mmol/L 135(L) 136 134(L) 133(L) 137 136 134(L) Potassium 3.5 - 5.1 mmol/L 3.8 4.1 3.9 3.8 3.5 3. 3(L) 4.0 TSH 0.36 - 3.74 uIU/mL - - - - - - -  
PSA 0.01 - 4.0 ng/mL - - - - - - -  
LDH 85 - 241 U/L 421(H) 427(H) 410(H) 437(H) 516(H) 422(H) 435(H) CEA ng/mL - - - - - - - Some recent data might be hidden Lab Results Component Value Date/Time TSH 2.40 10/25/2019 07:39 PM  
 TSH 2.45 06/01/2019 04:16 AM  
 
 
ALLERGY: 
No Known Allergies CURRENT MEDICATIONS: 
Current Facility-Administered Medications Medication Dose Route Frequency  0.9% sodium chloride infusion 250 mL  250 mL IntraVENous PRN  
 bumetanide (BUMEX) injection 1 mg  1 mg IntraVENous BID  phytonadione (vitamin K1) (AQUA-MEPHYTON) 10 mg in 0.9% sodium chloride 50 mL IVPB  10 mg IntraVENous DAILY  temazepam (RESTORIL) capsule 15 mg  15 mg Oral QHS PRN  
 sodium chloride (NS) flush 5-40 mL  5-40 mL IntraVENous Q8H  
 sodium chloride (NS) flush 5-40 mL  5-40 mL IntraVENous PRN  
 naloxone (NARCAN) injection 0.4 mg  0.4 mg IntraVENous PRN  
 diphenhydrAMINE (BENADRYL) injection 25 mg  25 mg IntraVENous Q4H PRN  
 oxymetazoline (AFRIN) 0.05 % nasal spray 2 Spray  2 Spray Right Nostril BID PRN  
 epoetin yvonne-epbx (RETACRIT) injection 20,000 Units  20,000 Units SubCUTAneous Q TUE, THU & SAT  bumetanide (BUMEX) injection 1 mg  1 mg IntraVENous Q6H PRN  
 lactobac ac& pc-s.therm-b.anim (TIAN Q/RISAQUAD)  1 Cap Oral DAILY  sodium chloride (NS) flush 5-40 mL  5-40 mL IntraVENous PRN  
 simethicone (MYLICON) 64OR/9.9TL oral drops 80 mg  1.2 mL Oral Multiple  ceFAZolin (ANCEF) 2 g/20 mL in sterile water IV syringe  2 g IntraVENous Q8H  
 benzocaine-menthol (CEPACOL) lozenge 1 Lozenge  1 Lozenge Mucous Membrane PRN  
 hydrALAZINE (APRESOLINE) tablet 10 mg  10 mg Oral TID  
 0.9% sodium chloride infusion 250 mL  250 mL IntraVENous PRN  
 amiodarone (CORDARONE) tablet 100 mg  100 mg Oral DAILY  dronabinol (MARINOL) capsule 2.5 mg  2.5 mg Oral ACB&D  
 insulin lispro (HUMALOG) injection   SubCUTAneous AC&HS  sodium chloride (OCEAN) 0.65 % nasal squeeze bottle 2 Spray  2 Spray Both Nostrils QID  alteplase (CATHFLO) 1 mg in dextrose 5% 50 mL impella purge solution  1 mg Other TITRATE  pantoprazole (PROTONIX) tablet 40 mg  40 mg Oral ACB  dextrose 5% infusion  4-20 mL/hr IntraVENous CONTINUOUS  
 [Held by provider] bivalirudin (ANGIOMAX) 250 mg in dextrose 5% 250 mL infusion  4-20 mL/hr Other TITRATE  alteplase (CATHFLO) 1 mg in sterile water (preservative free) 1 mL injection  1 mg InterCATHeter PRN  
 bacitracin 500 unit/gram packet 1 Packet  1 Packet Topical PRN  
 sodium chloride (NS) flush 5-40 mL  5-40 mL IntraVENous Q8H  
 sodium chloride (NS) flush 5-40 mL  5-40 mL IntraVENous PRN  
 0.45% sodium chloride infusion  10 mL/hr IntraVENous CONTINUOUS  
 0.9% sodium chloride infusion  10 mL/hr IntraVENous CONTINUOUS  
 oxyCODONE IR (ROXICODONE) tablet 5 mg  5 mg Oral Q4H PRN  
 oxyCODONE IR (ROXICODONE) tablet 10 mg  10 mg Oral Q4H PRN  
 morphine injection 4 mg  4 mg IntraVENous Q2H PRN  
 naloxone (NARCAN) injection 0.4 mg  0.4 mg IntraVENous PRN  
 ondansetron (ZOFRAN) injection 4 mg  4 mg IntraVENous Q4H PRN  
 albuterol (PROVENTIL VENTOLIN) nebulizer solution 2.5 mg  2.5 mg Nebulization Q4H PRN  chlorhexidine (PERIDEX) 0.12 % mouthwash 10 mL  10 mL Oral Q12H  
 magnesium oxide (MAG-OX) tablet 400 mg  400 mg Oral BID  calcium chloride 1 g in 0.9% sodium chloride 250 mL IVPB  1 g IntraVENous PRN  
 bisacodyl (DULCOLAX) suppository 10 mg  10 mg Rectal DAILY PRN  
 senna-docusate (PERICOLACE) 8.6-50 mg per tablet 1 Tab  1 Tab Oral BID  polyethylene glycol (MIRALAX) packet 17 g  17 g Oral DAILY  ELECTROLYTE REPLACEMENT NOTE: Nurse to review Serum Potassium and Magnesuim levels and Initiate Electrolyte Replacement Protocol as needed  1 Each Other PRN  
 magnesium sulfate 1 g/100 ml IVPB (premix or compounded)  1 g IntraVENous PRN  
 glucose chewable tablet 16 g  4 Tab Oral PRN  
 glucagon (GLUCAGEN) injection 1 mg  1 mg IntraMUSCular PRN  
  dextrose 10% infusion 0-250 mL  0-250 mL IntraVENous PRN  
 morphine injection 2 mg  2 mg IntraVENous Q2H PRN  
 melatonin tablet 3 mg  3 mg Oral QHS PRN  
 albumin human 5% (BUMINATE) solution 25 g  25 g IntraVENous Q2H PRN  
 influenza vaccine 2019-20 (6 mos+)(PF) (FLUARIX/FLULAVAL/FLUZONE QUAD) injection 0.5 mL  0.5 mL IntraMUSCular PRIOR TO DISCHARGE  
 [Held by provider] aspirin delayed-release tablet 81 mg  81 mg Oral DAILY  tamsulosin (FLOMAX) capsule 0.8 mg  0.8 mg Oral DAILY  allopurinol (ZYLOPRIM) tablet 100 mg  100 mg Oral DAILY Procedures LEFT AND RIGHT HEART CATH / CORONARY ANGIOGRAPHY Pre-procedure Diagnosis LV[facility] (left ventricular assist device) [facility] [C45.046] Indications LVAD (left ventricular assist device) present ([facility] [J34.813 (ICD-10-CM)] Link to printable coronary/vascular diagram report Coronary/Vascular Diagrams Phase: Baseline Data Systolic Diastolic Mean dP/dt A Wave V Wave RV Pressures 49 mmHg 5 mmHg 485 mmHg/sec PA Pressures 49 mmHg 15 mmHg 29 mmHg LV Pressures 96 mmHg 12 mmHg 824 mmHg/sec AO Pressures 87 mmHg 68 mmHg 76 mmHg RA Pressures   5 mmHg 9 mmHg 7 mmHg PCW Pressures   18 mmHg 16 mmHg 33 mmHg Phase: Baseline Data HR TDCO TDCI Brad CI PVR/SVR TPR/TVR Cardiac Output Results    3.44 L/min/m2 Resistance Results     0.15  
 0.38 Blood Oximetry PA O2 Sat  
59 % Sedation Time Moderate conscious sedation of 1 hour 7 minutes 19 seconds was performed by an independent provider giving the medication per my discretion and monitoring the vital signs throughout the case. Thank you for allowing me to participate in this patient's care. TIERRA Irving [address] [facility] 
[address] [facility], [address] Phone: [phone] Fax: [phone]

## 2019-11-16 NOTE — PROGRESS NOTES
[facility] [place] ICU Progress Note Admit Date: 10/25/2019 POD: 15 Day Post-Op Procedure:  Procedure(s): RIGHT AXILLARY IMPELLA INSERTION Subjective:  
Pt seen with Dr. Geraldo Alexandre. Feels good  Impella P8, D5 purge. 3.7LPM 
TPA No changes in therapy Objective:  
Vitals: 
Blood pressure 91/79, pulse 79, temperature 98.4 °F (36.9 °C), resp. rate 14, height 6' 2\" (1.88 m), weight 201 lb 15.1 oz (91.6 kg), SpO2 (!) 75 %. Temp (24hrs), Av.5 °F (36.9 °C), Min:98.4 °F (36.9 °C), Max:98.6 °F (37 °C) Hemodynamics: 
 CO: CO (l/min): 8.3 l/min CI: CI (l/min/m2): 4 l/min/m2 CVP: CVP (mmHg): 4 mmHg (19 09) SVR: SVR (dyne*sec)/cm5: 781 (dyne*sec)/cm5 (19 1500) PAP Systolic: PAP Systolic: 45 (// 6317) PAP Diastolic: PAP Diastolic: 19 ( 4383) PVR:   
 SV02: SVO2 (%): 51 % (19 1500) SCV02: SCVO2 (%): 75 % (10/29/19 1900) EKG/Rhythm:   
Paced in the 90s Oxygen Therapy: 
Oxygen Therapy O2 Sat (%): (!) 75 % (19 0900) Pulse via Oximetry: 83 beats per minute (19 1500) O2 Device: Room air (19 0400) O2 Flow Rate (L/min): 2 l/min (19 0800) FIO2 (%): 40 % (10/30/19 0846) CXR:  
CXR Results  (Last 48 hours)  
          
 19 0449  XR CHEST PORT Final result Impression:  IMPRESSION:  
No significant interval change. Narrative:  PORTABLE CHEST RADIOGRAPH/S: 2019 4:49 AM  
   
Clinical history: Postoperative heart INDICATION:   postop heart COMPARISON: 11/15/2019 FINDINGS:  
AP portable upright view of the chest demonstrates a stable  cardiopericardial  
silhouette. The lungs are adequately expanded. Cardiac assist device and PICC  
line catheter unchanged. Minimal interstitial edema unchanged. Cardiac pacer as  
well. . . Patient is on a cardiac monitor. 11/15/19 0458  XR CHEST PORT Final result Impression:  IMPRESSION:  
No significant interval change. Narrative:  PORTABLE CHEST RADIOGRAPH/S: 11/15/2019 4:58 AM  
   
Clinical history: Postoperative heart INDICATION:   postop heart COMPARISON: 2019 FINDINGS:  
AP portable upright view of the chest demonstrates a stable  cardiopericardial  
silhouette. The lungs are adequately expanded. Cardiac assist device and PICC  
line catheter unchanged. Minimal interstitial edema unchanged. Cardiac pacer as  
well. . The osseous structures are unremarkable. Patient is on a cardiac monitor. Admission Weight: Last Weight Weight: 192 lb 10.9 oz (87.4 kg) Weight: 201 lb 15.1 oz (91.6 kg) Intake / Luz Christina / Drain: 
Current Shift:  0701 -  1900 In: 240 [P.O.:240] Out: - Last 24 hrs.:  
 
Intake/Output Summary (Last 24 hours) at 2019 6355 Last data filed at 2019 0900 Gross per 24 hour Intake 540 ml Output 1900 ml Net -1360 ml EXAM: 
General:  NAD. Up in the chair Lungs:   Diminished in the bases. Incision:  Impella dressing C/D/I Heart:  Regular rate and rhythm, S1, S2 normal, no murmur, click, rub or gallop. Abdomen:   Soft, non-tender. Bowel sounds hypoactive. No masses,  No organomegaly. Extremities:  +2 bilateral lower extremity pitting edema. PPP. Neurologic:  Gross motor and sensory apparatus intact. Labs:  
Recent Labs 19 
8186 19 
4064 WBC  --  3.7* HGB  --  8.7* HCT  --  28.5* PLT  --  98* NA  --  135* K  --  3.8 BUN  --  12  
CREA  --  1.15  
GLU  --  77 GLUCPOC 105*  --   
INR  --  1.1 Assessment:  
 
Active Problems: 
  Acute decompensated heart failure ([facility] (10/25/2019) Plan/Recommendations/Medical Decision Makin. Acute on chronic systolic (CHF Class IV) S/P Impella: Has ICD. LV EF 16-20%. No BB/ACE/ARB/AA until appropriate. Holding diuresis per AHFS. Trend proBNP, lactate, LDH. Strict I/Os. Daily weights. LVAD w/u in process - looking towards 11/18/19. On Ancef for ppx. Continue D5 purge due to hematuria/epistaxis 2. Thrombocytopenia: Platelets at 768Q today. No asa. HIT negative, LOAN negative. On protonix. Heme following. 3. CAD S/p CABG 2010: Needs LHC today w/ Dr. Kary Melgar. Stopped asa d/t hematuria/thrombocytopenia, not on statin historically. No BB d/t lower BP, on hydralazine but may need to hold 4. PAF s/p DCCV 6/2019: Holding eliquis due to hematuria/epistaxis. On PO amio. 5. JUAN J on CKD stage IV: Monitor. Renal following. Diuretics PRN (currently holding) CVP >10. Keep neal. 6. Hx of urinary retention/BPH, hematuria: On flomax. Neal in place, CBI stopped since urine clear. Urology ok to DC neal. Cystoscopy clear. 7. JOSE: qhs CPAP. 8. Hx of sternal wound infection requring sternectomy: Not a contraindication for future LVAD per Dr. Angeles Odell. Tagged WBC -- showed no abnormal tracer uptake. 9. Iron def anemia: s/p venofer. H&H stable. On Epogen. Cannot use doxy/octreotide d/t prolonged QTC. Prev +occult stool. EGD/colonoscopy 11/11/19 neg for any acute process/abnormality, only revealed diverticulitis 10. Vocal cord paralysis: Voice improving. Speech eval PRN. Advance diet as tolerated. 11. Constipation: Resolved. Continue pericolace, miralax (pt keeps refusing). Continue daily suppository PRN. 12. Serratia UTI/hematuria: completed Zosyn. Monitor 13. Mediastinal lymphadenopathy:  Per AHF, low threshold for Carcinoma per CT scans. Eventually needs PET scan. 14. Hyponatremia: Resolved-monitor 15. Acute Moderate protein-calorie malnutrition:  Pre-albumin 10/25 was 13.9. Repeat 19.4. Pt eating cardiac diet well. Continue marinol Dispo: Care and orders per AHFS. Remain in CCU. Plan for LVAD 11-18-19.  Transfuse to get platelets above 866 and Hemoglobin > 8 over the weekend to prepare for surgery Monday.    
 
Signed By: MALCOLM Abernathy

## 2019-11-16 NOTE — PROGRESS NOTES
1930 Bedside shift change report given to [facility] (oncoming nurse) by Bronwyn Spaulding (offgoing nurse). Report included the following information SBAR, Intake/Output, MAR, Recent Results and Cardiac Rhythm Paced. 2230 Cathflo purge solution complete. Impella purge solution changed to D5 
2300 Purge flow 4. Cathflo impella purge solution ordered from pharmacy 2347 Cathflo Impella purge solution started. Purge flow 3.5 
0415 AM labs drawn 0441 Cathflo complete. Purge flow 8.6. D5 impella purge solution started 0650 Purge flow 3.7 ml/hr. Pharmacy paged for Darrion 3. 8854 Bedside shift change report given to Genesis Pradhan (oncoming nurse) by [facility] (offgoing nurse). Report included the following information SBAR, Intake/Output, MAR, Recent Results and Cardiac Rhythm Paced.

## 2019-11-16 NOTE — PROGRESS NOTES
0730 Bedside shift change report given to Bryan Pacheco (oncoming nurse) by Dorie Soulier (offgoing nurse). Report included the following information Intake/Output, MAR, Recent Results, Cardiac Rhythm Paced and Alarm Parameters . 0800 Pt c/o neck pain, but otherwise no complaints. Up to chair, bathed and shaved. 1100 First unit of platelets transfusing. 1309 Second unit of platelets transfusing 1445 PICC line removed per [facility]. 2 PIVs started. 1930 Bedside shift change report given to Dorie Soulier (oncoming nurse) by Bryan Pacheco (offgoing nurse). Report included the following information SBAR, Kardex, Procedure Summary, Intake/Output, MAR, Recent Results and Cardiac Rhythm Paced.

## 2019-11-16 NOTE — PROGRESS NOTES
Bedside shift change report given to 2201 José Luis Aguero (oncoming nurse) by Jose Padilla (offgoing nurse). Report included the following information SBAR, Kardex, ED Summary, Recent Results, Cardiac Rhythm Paced and Alarm Parameters . 0900- walked in dias with PT 
1520- cathflo impella for purge flow of 3.2 
1800- VSS Bedside shift change report given to Sushil Reid (oncoming nurse) by Jil Holstein (offgoing nurse). Report included the following information SBAR, Kardex, ED Summary, Recent Results, Cardiac Rhythm Paced and Alarm Parameters .

## 2019-11-17 NOTE — PROGRESS NOTES
NYHA class IV A/C systolic heart failure Low EF (10%) Inotrope dependent Malnutrition  
LVAD Work-up Epistaxis Hematuria 
  
Pre-Op Plan (11/18/19 implant) : 
1) Platelets a little low - will need to be above 150 before surgery 2) Want Hgb 8.5 or greater 3) Lines today 4) Start Vanc when we get in the room 5) Make sure bend relief lock is in place 6) Start drips after we finish the inflow cannula 7) Needs colonoscopy 
  
  
Op Plan: 
1) Will not need sternal saw - can go right to incision and dissection (need ANDREA bar's) 2) Dissection:  
            Y. The RV rises above level of posterior sternal plate inferiorly (start high) 
            B. The innominate vein is unusually low (can feel for the wires) 
            C. Will likely need to incorporate proximal CABG anastomosis in side-biter                  for outflow graft             X. IRIZARRY to LAD graft is fairly lateral and should be out of the way             E. Will need to get aorta and LV apex out as usual 
            F. Place drive-line (somewhat thin adipose tissue) 3) Cannulation (give heparin): 
            T. Left groin 25 Fr venous (will save the right in case we need RVAD;  
                KMR place triple lumen early) 
            B. Build 10 mm graft side-arm off Impella graft (glue; will need to extend                  axiallary graft back); have 3/8 connector already attached  
4) Implant: 
            Q. Remove Impella, de-clot graft (#4 Shakila), back-flush, hook up to bypass  
                HYJAIDJ, go on bypass (place extra sucker in axillary wound             X. Packs, find apex, and core (remove trabeculations; suture in CO2 and                  pump sucker lines) 
            C. Place (4) 2-0 Ethibond sutures, bring them through inflow cannula sewing  
                 ring, and tie down 
            D. Place (2) 4-0 SH running sutures, bioglue             E. Attach pump, remove packs, and lower into well             E. FOTMR outflow graft, turn on drips, and turn on pump at 3000 
            G. Measure outflow graft (add 1 cm), side-biter, aortotomy, and outflow graft                  anastomosis             H. Place de-airing stitch and remove clamps  
            I. Come off bypass and up on LVAD as usual 
            J. Staple off axillary graft  
  
Chest / Abdominal CT scan:  
  
Sternum is  although it looks like he still has sternal bone; the right half is higher than the left half and will need to some down; should be able to close with 12 standard wires although the sternum is thin; back-up plan would be a weave  
  
RV is coming above the sternal edge somewhat at the inferior margin so need to be careful here 
  
Proximal comes off anterior portion of the aorta; may need to place side-biter such that it incorporates the proximal; needs a [facility] to figure out which way the graft is going; no significant calcium in the ascending aorta; Impella in place  
  
LIMA to LAD graft is fairly lateral so should be out of the way for dissection  
  
Right atrium looks a little big 
  
Innominate vein is a little low so need to be carefull with dissection (should be able to feel the pace leads)  
  Bilateral common iliac arteries are severely calcified and narrowed down into the 4 mm range; will be somewhat hazardous trying to place femoral arterial cannula; will be best to extend out impella graft, build 10 mm side-arm, and go on through the axillary artery; will need to flush out the graft after we pull the Impella and before going on bypass (timing will be important here); will need to place the femoral venous line first 
  
Abdominal wall adipose tissue is thin so need to be careful with drive-line  
  
LHC - pending 
  
TTE - severely dilated LV cavity (7.36 cm) with reduced EF (10%); mild MR, large left atrium; previous TTE's did not reveal any AI; has moderate RV dysfunction (RVIDD 5.4, RV/LV ratio 0.73, TAPSE 1.4, mild TR; good free wall motion); little worried that sternal closure with compress his RV so will be prepare for temp RVAD support  
  
Carotids - normal 
  
ABIs - normal  
  
PFT's - FEV-1 2.25 (59% predicted)  
  
Epistaxis - resolved  
  
Hematuria - resolved  
  
Hgb 8.2, platelets 98, INR 1.1 
  
BUN 15, creatinine 1.13 
  
Bilirubin 1.3, ALT 28, AST 35, alk phos 130 
  
LDH - 438, lactic acid 0.9 
  
NT pro-BNP - 7103  
  
CEA - 6.6 (mildy elevated)  
  
CXR - mild pulmonary edema 
  
Impella - flow 3.5 L/min @ P-6 Gained some fluid over the last few days Hgb and platelets a little low - getting tranfusions today Creatinine normal 
 
Bilirubin and other LFTs look good LDH and lactic acid look good Pro-calcitonin normal 
 
Lactic acid reasonable NT pro-BNP up a a bit CXR - mild pulmonary edema Discussed with HF team  
 
Impella - flow 3.5 L/min @ P-6 Intake/Output Summary (Last 24 hours) at 11/17/2019 1414 Last data filed at 11/17/2019 1200 Gross per 24 hour Intake 631.3 ml Output 1280 ml Net -648.7 ml  
 
Visit Vitals /87 Pulse 65 Temp 97.9 °F (36.6 °C) Resp 16 Ht 6' 2\" (1.88 m) Wt 202 lb 13.2 oz (92 kg) SpO2 98% BMI 26.04 kg/m² Risk of morbidity and mortality - high Medical decision making - high complexity Total critical care time - 30 minutes (CPT 03891)

## 2019-11-17 NOTE — PROGRESS NOTES
1930 Bedside shift change report given to Justin Champagne (oncoming nurse) by Sandra Gutiérrez (offgoing nurse). Report included the following information SBAR, Intake/Output, MAR, Recent Results and Cardiac Rhythm Paced. 2117 Cathflo purge solution started during day shift complete. Purge flow 9.1 ml/hr. Purge solution changed to D5 
2332 Repeated impella suction alarms. Placement unchanged. PRN Albumin given. No more suction alarms following medication administration 2339 Impella purge flow dropped <4. Cathflo started. 0411 AM labs drawn 0525 AM labs resulted. Electrolytes replaced per order set [place]

## 2019-11-17 NOTE — PROGRESS NOTES
0730 Bedside shift change report given to Alon Vu (oncoming nurse) by Ingrid Hernandez (offgoing nurse). Report included the following information SBAR, Kardex, Procedure Summary, Intake/Output and MAR.  
 
0800 Pt feeling nauseous, updated [facility] on patient presentation, SOB despite O2 sats 100% on 2LNC. Voice hoarseness returned. Orders for Stat Echo. 
0900 Dr. Jose Elias Chacon retracted Impella 1 cm, now at 41.5 cm 
1130 MAC placed, unable to float swan. CVP 9. CXR in process 1200 Bumex drip intiated, pacer rep contacted to interrogate 200 Pt requesting quiet moment with wife, will transfuse blood shortly 1400 MAC bleeding from suture site, quikclot applied, dressing changed 1715 Platelets started 1800 Altha making paperwork timed. 5 min 52 sec. All consents signed in chart 1930 Bedside shift change report given to Ingrid Hernandez (oncoming nurse) by Alon Vu (offgoing nurse). Report included the following information SBAR, Kardex, Procedure Summary, Intake/Output, MAR, Recent Results and Cardiac Rhythm Paced.

## 2019-11-17 NOTE — PROGRESS NOTES
[facility] ICU Progress Note Admit Date: 10/25/2019 POD: 16 Day Post-Op Procedure:  Procedure(s): RIGHT AXILLARY IMPELLA INSERTION Subjective:  
Pt seen with Dr. Jake Toussaint. Increase work of breathing, fluid overload Impella P8, D5 purge. 3.7LPM 
TPA Echo with adjustment of impella by Dr. Jake Toussaint Joan José Miguel today Primary management per HF Objective:  
Vitals: 
Blood pressure (!) 117/94, pulse 75, temperature 97.7 °F (36.5 °C), resp. rate 18, height 6' 2\" (1.88 m), weight 202 lb 13.2 oz (92 kg), SpO2 100 %. Temp (24hrs), Av.1 °F (36.7 °C), Min:97.7 °F (36.5 °C), Max:98.3 °F (36.8 °C) Hemodynamics: 
 CO: CO (l/min): 8.3 l/min CI: CI (l/min/m2): 4 l/min/m2 CVP: CVP (mmHg): 12 mmHg (19 1400) SVR: SVR (dyne*sec)/cm5: 781 (dyne*sec)/cm5 (19 1500) PAP Systolic: PAP Systolic: 45 (10/67/65 1714) PAP Diastolic: PAP Diastolic: 19 (23/04 9910) PVR:   
 SV02: SVO2 (%): 51 % (19 1500) SCV02: SCVO2 (%): 75 % (10/29/19 1900) EKG/Rhythm:   
Paced in the 90s Oxygen Therapy: 
Oxygen Therapy O2 Sat (%): 100 % (19 0900) Pulse via Oximetry: 83 beats per minute (19 1500) O2 Device: CPAP mask (19 0400) O2 Flow Rate (L/min): 2 l/min (19 0800) FIO2 (%): 40 % (10/30/19 0846) CXR:  
CXR Results  (Last 48 hours)  
          
 19 0431  XR CHEST PORT Final result Impression:  IMPRESSION:  
1. No interval change Narrative:  INDICATION:  postop heart EXAM: Portable chest 0403 . Comparison 2019. FINDINGS: There is no significant change in appearance the lungs. Cardiomediastinal silhouette is unchanged. No pneumothorax. Right arm PICC line  
is been removed. Otherwise, lines and tubes are unchanged in position. 19 0449  XR CHEST PORT Final result Impression:  IMPRESSION:  
No significant interval change.    
   
   
   
   
   
   
  
 Narrative:  PORTABLE CHEST RADIOGRAPH/S: 2019 4:49 AM  
 Clinical history: Postoperative heart INDICATION:   postop heart COMPARISON: 11/15/2019 FINDINGS:  
AP portable upright view of the chest demonstrates a stable  cardiopericardial  
silhouette. The lungs are adequately expanded. Cardiac assist device and PICC  
line catheter unchanged. Minimal interstitial edema unchanged. Cardiac pacer as  
well. . . Patient is on a cardiac monitor. Admission Weight: Last Weight Weight: 192 lb 10.9 oz (87.4 kg) Weight: 202 lb 13.2 oz (92 kg) Intake / Colie Deion / Drain: 
Current Shift:  07 - 1900 In: 20 [I.V.:20] Out: - Last 24 hrs.:  
 
Intake/Output Summary (Last 24 hours) at 2019 1059 Last data filed at 2019 3719 Gross per 24 hour Intake 1062.6 ml Output 1840 ml Net -777.4 ml EXAM: 
General:  NAD. Up in the chair Lungs:   Diminished in the bases. Incision:  Impella dressing C/D/I Heart:  Regular rate and rhythm, S1, S2 normal, no murmur, click, rub or gallop. Abdomen:   Soft, non-tender. Bowel sounds hypoactive. No masses,  No organomegaly. Extremities:  +2 bilateral lower extremity pitting edema. PPP. Neurologic:  Gross motor and sensory apparatus intact. Labs:  
Recent Labs 19 
0411 19 
2110 WBC 3.8*  --   
HGB 8.4*  --   
HCT 27.6*  --   
*  --   
  --   
K 3.6  --   
BUN 12  --   
CREA 1.20  --   
GLU 77  --   
GLUCPOC  --  110* INR 1.1  -- Assessment:  
 
Active Problems: 
  Acute decompensated heart failure ([facility] [address] (10/25/2019) Plan/Recommendations/Medical Decision Makin. Acute on chronic systolic (CHF Class IV) S/P Impella: Has ICD. LV EF 16-20%. No BB/ACE/ARB/AA until appropriate. Holding diuresis per AHFS. Trend proBNP, lactate, LDH. Strict I/Os. Daily weights.  LVAD w/u in process - looking towards 11/18/19. On Ancef for ppx. Continue D5 purge due to hematuria/epistaxis 2. Thrombocytopenia: Platelets at 899B today. No asa. HIT negative, LOAN negative. On protonix. Heme following. 3. CAD S/p CABG 2010: Needs [facility] today w/ Dr. Joe Severino. Stopped asa d/t hematuria/thrombocytopenia, not on statin historically. No BB d/t lower BP, on hydralazine but may need to hold 4. PAF s/p DCCV 6/2019: Holding eliquis due to hematuria/epistaxis. On PO amio. 5. JUAN J on CKD stage IV: Monitor. Renal following. Diuretics PRN (currently holding) CVP >10. Keep neal. 6. Hx of urinary retention/BPH, hematuria: On flomax. Neal in place, CBI stopped since urine clear. Urology ok to DC neal. Cystoscopy clear. 7. JOSE: qhs CPAP. 8. Hx of sternal wound infection requring sternectomy: Not a contraindication for future LVAD per Dr. Emilia Breaux. Tagged WBC -- showed no abnormal tracer uptake. 9. Iron def anemia: s/p venofer. H&H stable. On Epogen. Cannot use doxy/octreotide d/t prolonged QTC. Prev +occult stool. EGD/colonoscopy 11/11/19 neg for any acute process/abnormality, only revealed diverticulitis 10. Vocal cord paralysis: Voice improving. Speech eval PRN. Advance diet as tolerated. 11. Constipation: Resolved. Continue pericolace, miralax (pt keeps refusing). Continue daily suppository PRN. 12. Serratia UTI/hematuria: completed Zosyn. Monitor 13. Mediastinal lymphadenopathy:  Per AHF, low threshold for Carcinoma per CT scans. Eventually needs PET scan. 14. Hyponatremia: Resolved-monitor 15. Acute Moderate protein-calorie malnutrition:  Pre-albumin 10/25 was 13.9. Repeat 19.4. Pt eating cardiac diet well. Continue marinol Dispo: Care and orders per AHFS. Remain in CCU. Plan for LVAD 11-18-19. Transfuse to get platelets above 313 and Hemoglobin > 8 over the weekend to prepare for surgery Monday.    
 
Signed By: MALCOLM Dang

## 2019-11-17 NOTE — PROGRESS NOTES
[facility] [facility] in [place], [place] Inpatient Progress Note Patient name: Natalie Lofton Patient : 1950 Patient MRN: [id number] Attending MD: Nisha Payton MD 
Date of service: 19 CHIEF COMPLAINT: 
Cardiogenic shock HPI:  
Natalie Lofton is a 71y.o. year old pleasant white male with a history of HTN, HLD, JOSE, CAD s/p cardiac arrest VFib s/p CABG () c/b sternal would infection and sternectomy, ischemic cardiomyopathy LVEF 15-20%, s/p ICD and with LBBB. He was admitted with acute on chronic systolic heart failure with massive volume overload > 20 lbs, in the setting of atrial fibrillation s/p failed DCCV and underwent DCCV and RHC on . S/p BiVICD on 19 with Dr. Louise Wisdom. He was discharged home home on IV milrinone on 19. He has been followed closely by Dr. Vargas Figueroa and the [facility]. Mr. Abhilash Casillas was admitted for acute on chronic systolic heart failure. He underwent implantation of Impella 5.0 due to CS and has completed his LVAD evaluation. He meets criteria for implant of HM3 as DT. He was NYHA Class IV prior to implant of Impella 5.0, has LVEF < 15%, was intolerant of GDMT due to symptomatic hypotension and renal dysfunction. He remains dependent on temporary MCS support. RHC  revealed compensated hemodynamics on Impella. His renal function has improved dramatically with improvement in his hemodynamics. He is not a suitable transplant candidate due to single kidney, sternectomy, debility, and frailty. He was reviewed by [facility] and felt to be a high risk heart transplant candidate due to multiple co-morbidities as well as the afore mentioned conditions. He remains in the CCU undergoing preparation for HM 3 implant as DT on . Interval history: More dyspneic, nauseated today Wt up 6 lbs from [address] with normal hemodynamics Impella repositioned by Dr. Lizeth Garcia Has required multiple doses of TPA due to low purge flows Impression/Plan: 
Chronic systolic heart failure due to ICM, NYHA Class IV, cardiogenic shock on Impella 5.0 support Continue current impella speed at P8; cannot tolerate lower speeds Impella dislodged and repositioned, continue cefazolin indefinitely due to risk of infection Catheter retracted 1 cm by Dr. Grupo Wolfe with slight improvement in dyspnea Intermittent TPA via purge fluid per CSS - will not begin bivalirudin due to VAD implant in AM  
Intolerant of AC due to hematuria, epistaxis Placed PAC today due to change in patient status Goal CVP < 12 mmHG, CI > 2, MAP ~ 70 mmHg Adjust diuretics based upon hemodynamics Continue CAROL hose to mobilize LE fluid No ACE/ARB/ARNi in anticipation of surgery No AA due to hyponatremia and upcoming surgery LVAD evaluation complete - Last dose (3/3) of Vit K today - Lines today - Surgical and INTERMACS consents signed - Blood ordered - Antibiotics ordered - [employer] rep notified of implant date CKD 3, atrophic left kidney Appreciate Nephrology assistance Cr markedly improved with Impella support Continue current support at P-8 Diurese Avoid nephrotoxins Trend labs CAD s/p CABG Intolerant of ASA due to thrombocytopenia No BB due to symptomatic hypotension Hold statin due to recent hepatic failure LHC performed 11/13 - low likelihood of benefit from revascularization Hx of VF arrest s/p BiV-ICD No recent shocks Keep K > 4 and Mg > 2 [facility] [facility] rep to interrogate device and determine PM dependence - may need to change settings prior to OR tomorrow Hypertension, goal SBP < 120 mmHg Hydralazine 10 mg PO TID  
 
PAF Currently in NSR Intolerant to [facility] due to hematuria Continue amiodarone 100 mg PO daily Urinary retention, c/b serratia UTI and hematuria Appreciate Urology consult Cystoscopy performed 11/13 shows catheter induced cystitis Hold AC for now Malnutrition Appreciate Nutritionist consult Prealbumin improved to 19 Continue Marinol 2.5 mg PO BID and six small meals daily COPD Appreciate Pulmonology assistance Continue nebs PRN On RA  
PFTs complete - unable to perform DLCO due to Impella Anemia Multifactorial, due to hemolysis + epistaxis + hematuria Intolerant of octreotide or doxycycline for AVM ppx due to prolonged QTc Transfuse for Hgb goal > 8.5 prior to OR Diverticulosis Noted on colonoscopy 11/11 Monitor 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 No additional treatment at this time Hx of sternal wound infection, sternectomy Dr. Elda Anguiano has reviewed CTs, has surgical plan in place Vocal cord paralysis Improved Debility Continue PT/OT Appreciate assistance Ppx Protonix SCDs PT/OT  
PICC removed 11/16 PAC placed 11/17 CARDIAC IMAGING: 
Tagged WBC negative 
  
PHYSICAL EXAM: 
Visit Vitals BP (!) 117/94 Pulse 75 Temp 97.7 °F (36.5 °C) Resp 18 Ht 6' 2\" (1.88 m) Wt 202 lb 13.2 oz (92 kg) SpO2 100% BMI 26.04 kg/m² Impella (5.0) Performance Level (P Level): 8 Flow Rate L/min (0-2.5): 3.7 L/min Placement Signal (mmHg): Differential 5.0 (s/d 30-60/0 ex) Placement Signal (Systolic/Diastolic): 90/81 Motor Current (amp): (normal) Motor Current (Systolic/Diastolic): 600/835 Placement Monitoring: OK Purge Pressure (300-1100 mm Hg): 517 Purge Flow (ml/hr): 12.1 Sidearm Pressure Bag @ 300 mmHg/ flushed (Na with 5.0 Impella): Yes Power (AC/Battery): Yes Impella Pump Serial Number: [id number] Review of Systems Constitutional: Positive for malaise/fatigue. Negative for chills and fever. HENT: Positive for hearing loss. Respiratory: Positive for shortness of breath. Negative for cough. Cardiovascular: Positive for orthopnea and leg swelling. Negative for chest pain, palpitations and PND. Gastrointestinal: Positive for nausea. Negative for heartburn and vomiting. Musculoskeletal: Negative. Neurological: Negative for dizziness, focal weakness and headaches. Psychiatric/Behavioral: Negative. Physical Exam  
Constitutional: He is oriented to person, place, and time and well-developed, well-nourished, and in no distress. No distress. HENT:  
Head: Normocephalic. Neck: Normal range of motion. Neck supple. JVD present. Cardiovascular: Normal rate, regular rhythm, normal heart sounds and intact distal pulses. Pulmonary/Chest: Effort normal and breath sounds normal. No respiratory distress. Abdominal: Soft. Bowel sounds are normal. He exhibits distension. Musculoskeletal: Normal range of motion. He exhibits edema. Neurological: He is alert and oriented to person, place, and time. Skin: Skin is warm and dry. No erythema. Psychiatric: Affect and judgment normal.  
Nursing note and vitals reviewed. PAST MEDICAL HISTORY: 
Past Medical History:  
Diagnosis Date  Degenerative disc disease, lumbar  Heart failure ([facility]  High cholesterol  Hypertension  Paroxysmal atrial fibrillation ([facility] 4/2/2019  Spinal stenosis PAST SURGICAL HISTORY: 
Past Surgical History:  
Procedure Laterality Date  COLONOSCOPY Left 11/11/2019 COLONOSCOPY at bedside performed by Catalina Guerrero MD at [address]  HX CORONARY ARTERY BYPASS GRAFT    
 triple  HX HERNIA REPAIR    
 HX IMPLANTABLE CARDIOVERTER DEFIBRILLATOR  SD CARDIOVERSION ELECTIVE ARRHYTHMIA EXTERNAL N/A 6/10/2019 EP CARDIOVERSION performed by Eveline Michaels MD at [place] [address], [facility] [address] CATH LAB  SD CARDIOVERSION ELECTIVE ARRHYTHMIA EXTERNAL N/A 6/18/2019 EP CARDIOVERSION performed by Trever Jackson MD at [place] [address], [facility] [address] CATH LAB  SD INSJ/RPLCMT PERM DFB W/TRNSVNS LDS 1/DUAL CHMBR N/A 6/21/2019  INSERT ICD BIV MULTI performed by Nolan Gillette MD at [place] [address], [facility] [address] CATH LAB  
  CA TCAT IMPL WRLS P-ART PRS SNR L-T HEMODYN MNTR N/A 2019 IMPLANT HEART FAILURE MONITORING DEVICE performed by Lydia Chen MD at [address], [facility] [facility] FAMILY HISTORY: 
Family History Problem Relation Age of Onset  Lung Disease Mother  Hypertension Mother Canseco Arthritis-osteo Mother  Heart Disease Mother  Heart Disease Father  Diabetes Father SOCIAL HISTORY: 
Social History Socioeconomic History  Marital status:  Spouse name: Not on file  Number of children: Not on file  Years of education: Not on file  Highest education level: Not on file Tobacco Use  Smoking status: Former Smoker Last attempt to quit: 2010 Years since quittin.9  Smokeless tobacco: Never Used Substance and Sexual Activity  Alcohol use: Not Currently Comment: rarely  Drug use: Never Other Topics Concern LABORATORY RESULTS: 
  
Labs Latest Ref Rng & Units 2019 2019 11/15/2019 2019 2019 2019 2019 WBC 4.1 - 11.1 K/uL 3.8(L) 3. 7(L) 3. 7(L) 3. 7(L) 3.8(L) 3.8(L) 4.4  
RBC 4.10 - 5.70 M/uL 2.79(L) 2.88(L) 2.74(L) 2.69(L) 2.86(L) 2.95(L) 2.86(L) Hemoglobin 12.1 - 17.0 g/dL 8.4(L) 8.7(L) 8. 1(L) 8.0(L) 8.5(L) 8.7(L) 8.5(L) Hematocrit 36.6 - 50.3 % 27. 6(L) 28. 5(L) 27. 3(L) 26. 7(L) 27. 5(L) 28. 1(L) 27. 5(L) MCV 80.0 - 99.0 FL 98.9 99.0 99. 6(H) 99. 3(H) 96.2 95.3 96.2 Platelets 171 - 603 K/uL 109(L) 98(L) 90(L) 82(L) 105(L) 111(L) 93(L) Lymphocytes 12 - 49 % - - - - - - - Monocytes 5 - 13 % - - - - - - - Eosinophils 0 - 7 % - - - - - - - Basophils 0 - 1 % - - - - - - - Albumin 3.5 - 5.0 g/dL 2. 6(L) 2. 5(L) 2. 4(L) 2. 6(L) 2. 3(L) 2. 4(L) 2. 2(L) Calcium 8.5 - 10.1 MG/DL 8.4(L) 8.4(L) 8.6 8.5 8.6 8.9 8.6 SGOT 15 - 37 U/L 20 21 20 22 25 27 28 Glucose 65 - 100 mg/dL 77 77 88 84 87 86 76 BUN 6 - 20 MG/DL 12 12 13 12 12 13 13 Creatinine 0.70 - 1.30 MG/DL 1.20 1.15 1.17 1.28 1.31(H) 1.25 0.97  
 Sodium 136 - 145 mmol/L 136 135(L) 136 134(L) 133(L) 137 136 Potassium 3.5 - 5.1 mmol/L 3.6 3.8 4.1 3.9 3.8 3.5 3. 3(L) TSH 0.36 - 3.74 uIU/mL - - - - - - -  
PSA 0.01 - 4.0 ng/mL - - - - - - -  
LDH 85 - 241 U/L 439(H) 421(H) 427(H) 410(H) 437(H) 516(H) 422(H) CEA ng/mL - - - - - - - Some recent data might be hidden Lab Results Component Value Date/Time TSH 2.40 10/25/2019 07:39 PM  
 TSH 2.45 06/01/2019 04:16 AM  
 
 
ALLERGY: 
No Known Allergies CURRENT MEDICATIONS: 
Current Facility-Administered Medications Medication Dose Route Frequency  artificial saliva (MOUTH KOTE) 1 Spray  1 Spray Oral PRN  
 magnesium oxide (MAG-OX) tablet 400 mg  400 mg Oral TID  sodium chloride (NS) flush 5-40 mL  5-40 mL IntraVENous Q8H  
 sodium chloride (NS) flush 5-40 mL  5-40 mL IntraVENous PRN  
 mupirocin (BACTROBAN) 2 % ointment 1 g  1 g Both Nostrils BID  [START ON 11/18/2019] rifAMPin (RIFADIN) 600 mg in 0.9% sodium chloride 100 mL IVPB  600 mg IntraVENous ONCE  
 [START ON 11/18/2019] fluconazole (DIFLUCAN) 200mg/100 mL IVPB (premix)  200 mg IntraVENous ONCE  
 [START ON 11/18/2019] levoFLOXacin (LEVAQUIN) 500 mg in D5W IVPB  500 mg IntraVENous ONCE  
 0.9% sodium chloride infusion  9 mL/hr IntraVENous CONTINUOUS  
 [Held by provider] bumetanide (BUMEX) injection 1 mg  1 mg IntraVENous BID  temazepam (RESTORIL) capsule 15 mg  15 mg Oral QHS PRN  
 sodium chloride (NS) flush 5-40 mL  5-40 mL IntraVENous Q8H  
 sodium chloride (NS) flush 5-40 mL  5-40 mL IntraVENous PRN  
 naloxone (NARCAN) injection 0.4 mg  0.4 mg IntraVENous PRN  
 diphenhydrAMINE (BENADRYL) injection 25 mg  25 mg IntraVENous Q4H PRN  
 oxymetazoline (AFRIN) 0.05 % nasal spray 2 Spray  2 Spray Right Nostril BID PRN  
 epoetin yvonne-epbx (RETACRIT) injection 20,000 Units  20,000 Units SubCUTAneous Q TUE, THU & SAT  lactobac ac& pc-s.therm-b.anim (TIAN Q/RISAQUAD)  1 Cap Oral DAILY  sodium chloride (NS) flush 5-40 mL  5-40 mL IntraVENous PRN  
 simethicone (MYLICON) 41ED/1.8DO oral drops 80 mg  1.2 mL Oral Multiple  ceFAZolin (ANCEF) 2 g/20 mL in sterile water IV syringe  2 g IntraVENous Q8H  
 benzocaine-menthol (CEPACOL) lozenge 1 Lozenge  1 Lozenge Mucous Membrane PRN  
 hydrALAZINE (APRESOLINE) tablet 10 mg  10 mg Oral TID  amiodarone (CORDARONE) tablet 100 mg  100 mg Oral DAILY  dronabinol (MARINOL) capsule 2.5 mg  2.5 mg Oral ACB&D  
 insulin lispro (HUMALOG) injection   SubCUTAneous AC&HS  sodium chloride (OCEAN) 0.65 % nasal squeeze bottle 2 Spray  2 Spray Both Nostrils QID  alteplase (CATHFLO) 1 mg in dextrose 5% 50 mL impella purge solution  1 mg Other TITRATE  pantoprazole (PROTONIX) tablet 40 mg  40 mg Oral ACB  dextrose 5% infusion  4-20 mL/hr IntraVENous CONTINUOUS  
 [Held by provider] bivalirudin (ANGIOMAX) 250 mg in dextrose 5% 250 mL infusion  4-20 mL/hr Other TITRATE  alteplase (CATHFLO) 1 mg in sterile water (preservative free) 1 mL injection  1 mg InterCATHeter PRN  
 bacitracin 500 unit/gram packet 1 Packet  1 Packet Topical PRN  
 sodium chloride (NS) flush 5-40 mL  5-40 mL IntraVENous Q8H  
 sodium chloride (NS) flush 5-40 mL  5-40 mL IntraVENous PRN  
 0.45% sodium chloride infusion  10 mL/hr IntraVENous CONTINUOUS  
 0.9% sodium chloride infusion  10 mL/hr IntraVENous CONTINUOUS  
 oxyCODONE IR (ROXICODONE) tablet 5 mg  5 mg Oral Q4H PRN  
 oxyCODONE IR (ROXICODONE) tablet 10 mg  10 mg Oral Q4H PRN  
 morphine injection 4 mg  4 mg IntraVENous Q2H PRN  
 naloxone (NARCAN) injection 0.4 mg  0.4 mg IntraVENous PRN  
 ondansetron (ZOFRAN) injection 4 mg  4 mg IntraVENous Q4H PRN  
 albuterol (PROVENTIL VENTOLIN) nebulizer solution 2.5 mg  2.5 mg Nebulization Q4H PRN  chlorhexidine (PERIDEX) 0.12 % mouthwash 10 mL  10 mL Oral Q12H  calcium chloride 1 g in 0.9% sodium chloride 250 mL IVPB  1 g IntraVENous PRN  
  bisacodyl (DULCOLAX) suppository 10 mg  10 mg Rectal DAILY PRN  
 senna-docusate (PERICOLACE) 8.6-50 mg per tablet 1 Tab  1 Tab Oral BID  polyethylene glycol (MIRALAX) packet 17 g  17 g Oral DAILY  ELECTROLYTE REPLACEMENT NOTE: Nurse to review Serum Potassium and Magnesuim levels and Initiate Electrolyte Replacement Protocol as needed  1 Each Other PRN  
 magnesium sulfate 1 g/100 ml IVPB (premix or compounded)  1 g IntraVENous PRN  
 glucose chewable tablet 16 g  4 Tab Oral PRN  
 glucagon (GLUCAGEN) injection 1 mg  1 mg IntraMUSCular PRN  
 dextrose 10% infusion 0-250 mL  0-250 mL IntraVENous PRN  
 morphine injection 2 mg  2 mg IntraVENous Q2H PRN  
 melatonin tablet 3 mg  3 mg Oral QHS PRN  
 influenza vaccine 2019-20 (6 mos+)(PF) (FLUARIX/FLULAVAL/FLUZONE QUAD) injection 0.5 mL  0.5 mL IntraMUSCular PRIOR TO DISCHARGE  tamsulosin (FLOMAX) capsule 0.8 mg  0.8 mg Oral DAILY  allopurinol (ZYLOPRIM) tablet 100 mg  100 mg Oral DAILY Procedures LEFT AND RIGHT HEART CATH / CORONARY ANGIOGRAPHY Pre-procedure Diagnosis [facility]left ventricular assist device) [facility] [M08.554] Indications LVAD (left ventricular assist device) present ([facility] [S94.450 (ICD-10-CM)] Link to printable coronary/vascular diagram report Coronary/Vascular Diagrams Phase: Baseline Data Systolic Diastolic Mean dP/dt A Wave V Wave RV Pressures 49 mmHg 5 mmHg 485 mmHg/sec PA Pressures 49 mmHg 15 mmHg 29 mmHg LV Pressures 96 mmHg 12 mmHg 824 mmHg/sec AO Pressures 87 mmHg 68 mmHg 76 mmHg RA Pressures   5 mmHg 9 mmHg 7 mmHg PCW Pressures   18 mmHg 16 mmHg 33 mmHg Phase: Baseline Data HR TDCO TDCI Brad CI PVR/SVR TPR/TVR Cardiac Output Results    3.44 L/min/m2 Resistance Results     0.15  
 0.38 Blood Oximetry PA O2 Sat  
59 % Sedation Time Moderate conscious sedation of 1 hour 7 minutes 19 seconds was performed by an independent provider giving the medication per my discretion and monitoring the vital signs throughout the case. Thank you for allowing me to participate in this patient's care. TIERRA Dunlap [address] [facility] 
[address] [facility], [address] Phone: [phone] Fax: [phone]

## 2019-11-17 NOTE — PROGRESS NOTES
Problem: Falls - Risk of 
Goal: *Absence of Falls Description Document Rich Zazueta Fall Risk and appropriate interventions in the flowsheet. Outcome: Progressing Towards Goal 
Note:  
Fall Risk Interventions: 
Mobility Interventions: Patient to call before getting OOB Mentation Interventions: Adequate sleep, hydration, pain control Medication Interventions: Patient to call before getting OOB Elimination Interventions: Call light in reach History of Falls Interventions: Evaluate medications/consider consulting pharmacy Problem: Patient Education: Go to Patient Education Activity Goal: Patient/Family Education Outcome: Progressing Towards Goal 
  
Problem: Pain Goal: *Control of Pain Outcome: Progressing Towards Goal 
Goal: *PALLIATIVE CARE:  Alleviation of Pain Outcome: Progressing Towards Goal 
  
Problem: Patient Education: Go to Patient Education Activity Goal: Patient/Family Education Outcome: Progressing Towards Goal 
  
Problem: Pressure Injury - Risk of 
Goal: *Prevention of pressure injury Description Document Mich Scale and appropriate interventions in the flowsheet. Outcome: Progressing Towards Goal 
Note:  
Pressure Injury Interventions: 
Sensory Interventions: Discuss PT/OT consult with provider Moisture Interventions: Absorbent underpads Activity Interventions: Assess need for specialty bed Mobility Interventions: Assess need for specialty bed Nutrition Interventions: Document food/fluid/supplement intake Friction and Shear Interventions: Apply protective barrier, creams and emollients Problem: Patient Education: Go to Patient Education Activity Goal: Patient/Family Education Outcome: Progressing Towards Goal 
  
Problem: Infection - Risk of, Multi-drug Resistant Organism Colonization (MDRO) Goal: *Absence of MDRO colonization Outcome: Progressing Towards Goal 
Goal: *Absence of infection signs and symptoms Outcome: Progressing Towards Goal 
  
Problem: Patient Education: Go to Patient Education Activity Goal: Patient/Family Education Outcome: Progressing Towards Goal 
  
Problem: Heart Failure: Discharge Outcomes Goal: *Demonstrates ability to perform prescribed activity without shortness of breath or discomfort Outcome: Progressing Towards Goal 
Goal: *Left ventricular function assessment completed prior to or during stay, or planned for post-discharge Outcome: Progressing Towards Goal 
Goal: *ACEI prescribed if LVEF less than 40% and no contraindications or ARB prescribed Outcome: Progressing Towards Goal 
Goal: *Verbalizes understanding and describes prescribed diet Outcome: Progressing Towards Goal 
Goal: *Verbalizes understanding/describes prescribed medications Outcome: Progressing Towards Goal 
Goal: *Describes available resources and support systems Description 
(eg: Home Health, Palliative Care, Advanced Medical Directive) Outcome: Progressing Towards Goal 
Goal: *Describes smoking cessation resources Outcome: Progressing Towards Goal 
Goal: *Understands and describes signs and symptoms to report to providers(Stroke Metric) Outcome: Progressing Towards Goal 
Goal: *Describes/verbalizes understanding of follow-up/return appt Description 
(eg: to physicians, diabetes treatment coordinator, and other resources Outcome: Progressing Towards Goal 
Goal: *Describes importance of continuing daily weights and changes to report to physician Outcome: Progressing Towards Goal 
  
Problem: Diabetes Self-Management Goal: *Disease process and treatment process Description Define diabetes and identify own type of diabetes; list 3 options for treating diabetes. Outcome: Progressing Towards Goal 
Goal: *Incorporating nutritional management into lifestyle Description Describe effect of type, amount and timing of food on blood glucose; list 3 methods for planning meals.  
Outcome: Progressing Towards Goal 
 Goal: *Incorporating physical activity into lifestyle Description State effect of exercise on blood glucose levels. Outcome: Progressing Towards Goal 
Goal: *Developing strategies to promote health/change behavior Description Define the ABC's of diabetes; identify appropriate screenings, schedule and personal plan for screenings. Outcome: Progressing Towards Goal 
Goal: *Using medications safely Description State effect of diabetes medications on diabetes; name diabetes medication taking, action and side effects. Outcome: Progressing Towards Goal 
Goal: *Monitoring blood glucose, interpreting and using results Description Identify recommended blood glucose targets  and personal targets. Outcome: Progressing Towards Goal 
Goal: *Prevention, detection, treatment of acute complications Description List symptoms of hyper- and hypoglycemia; describe how to treat low blood sugar and actions for lowering  high blood glucose level. Outcome: Progressing Towards Goal 
Goal: *Prevention, detection and treatment of chronic complications Description Define the natural course of diabetes and describe the relationship of blood glucose levels to long term complications of diabetes. Outcome: Progressing Towards Goal 
Goal: *Developing strategies to address psychosocial issues Description Describe feelings about living with diabetes; identify support needed and support network Outcome: Progressing Towards Goal 
Goal: *Insulin pump training Outcome: Progressing Towards Goal 
Goal: *Sick day guidelines Outcome: Progressing Towards Goal 
Goal: *Patient Specific Goal (EDIT GOAL, INSERT TEXT) Outcome: Progressing Towards Goal 
  
Problem: Patient Education: Go to Patient Education Activity Goal: Patient/Family Education Outcome: Progressing Towards Goal 
  
Problem: Patient Education: Go to Patient Education Activity Goal: Patient/Family Education Outcome: Progressing Towards Goal 
  
 Problem: Discharge Planning Goal: *Discharge to safe environment Outcome: Progressing Towards Goal 
  
Problem: Patient Education: Go to Patient Education Activity Goal: Patient/Family Education Outcome: Progressing Towards Goal 
  
Problem: Nutrition Deficit Goal: *Optimize nutritional status Outcome: Progressing Towards Goal 
  
Problem: Nutrition Deficit Goal: *Optimize nutritional status Outcome: Progressing Towards Goal 
  
Problem: Nutrition Deficit Goal: *Optimize nutritional status Outcome: Progressing Towards Goal 
  
Problem: Infection - Risk of, Urinary Catheter-Associated Urinary Tract Infection Goal: *Absence of infection signs and symptoms Outcome: Progressing Towards Goal 
  
Problem: Patient Education: Go to Patient Education Activity Goal: Patient/Family Education Outcome: Progressing Towards Goal

## 2019-11-17 NOTE — PROGRESS NOTES
Critical Care Note Pt seen and examined plan of care reviewed on rounds with heart failure team   
 
[address] [place] [address] Chronic systolic heart failure due to ICM, NYHA Class IV, cardiogenic shock on Impella 5.0 support Hx of VF arrest s/p AICD IMPRESSION Increased dyspnea this am  
Echo TTE reviewed and Impella reposition (see separate note) Patient Vitals for the past 4 hrs: 
 Pulse Resp BP SpO2  
11/17/19 0800   (!) 117/94   
11/17/19 0700 76 20 (!) 127/103 97 % 11/17/19 0600 71 14 (!) 124/106 99 % Intake/Output Summary (Last 24 hours) at 11/17/2019 3380 Last data filed at 11/17/2019 0700 Gross per 24 hour Intake 1042.6 ml Output 2100 ml Net -1057.4 ml Hemodynamics: see flow sheet Vent settings Ventilator Volumes Vt Set (ml): 450 ml (10/30/19 0846) Vt Exhaled (Machine Breath) (ml): 442 ml (10/30/19 0815) Vt Spont (ml): 476 ml (10/30/19 0846) Ve Observed (l/min): 9.6 l/min (10/30/19 0846) Chest xrays reviewed HCT Date/Time Value Ref Range Status 11/17/2019 04:11 AM 27.6 (L) 36.6 - 50.3 % Final  
 
WBC Date/Time Value Ref Range Status 11/17/2019 04:11 AM 3.8 (L) 4.1 - 11.1 K/uL Final  
 
 
Labs reviewed for last 24 hours Plan Impella reposition PA line placement LVAD in am  
 
 
Critical care time 30 minutes Medical decision making- High complexity Risk of morbidity and mortality - high CPT code 46298

## 2019-11-17 NOTE — PROGRESS NOTES
PA Catheter Procedure Note Indication: CHF Risks, benefits, and alternatives explained and patient agrees to proceed. Patient positioned in Trendelenburg position. 7-Step Sterility Protocol followed. (cap, mask, sterile gown, sterile gloves, large sterile sheet, hand hygiene, 2% chlorhexidine for cutaneous antisepsis) Local with 5 mL 1% Lidocaine injected at insertion site. Right internal jugular cannulated x 1 attempt(s) utilizing sterile Seldinger technique. Venous cannulation confirmed with column drop test.   
9 Fr MAC placed and PA catheter inserted. Unable to float to PA. Catheter secured & Biopatch applied. Sterile Tegaderm placed. CXR pending.

## 2019-11-17 NOTE — PROCEDURES
Patient evaluated in CCU with concerns over Impella position TTE reviewed and bedside and device withdrawn 1 cm to position No apparent alarms now present

## 2019-11-18 NOTE — PROGRESS NOTES
1410: TRANSFER - IN REPORT: 
 
Verbal report received from MALCOLM Graham and CRNA(name) on Sona Kiser  being received from [facility](unit) for routine post - op Report consisted of patients Situation, Background, Assessment and  
Recommendations(SBAR). Information from the following report(s) SBAR, OR Summary, Procedure Summary, Intake/Output, MAR, Recent Results, Cardiac Rhythm Paced and Alarm Parameters  was reviewed with the receiving nurse. Opportunity for questions and clarification was provided. Assessment completed upon patients arrival to unit and care assumed. All drips verified with CRNA Orders to keep CI >2 and to wean gtts to keep MAPs <90 (pressors --> Epi --> Dobut) 1430: Yaima Gee NP at bedside. Orders received to keep pt intubated overnight. Wean Epi gtt first, then vaso, then dobutamine to keep MAP <90 
 
1500: Dr. Rona Joaquin at bedside, checking SVR (1600), orders to continue to titrate gtts to keep MAPs 70-90 , keep SVR ~1000, and CVP 10-12 
 
1512: Shana BEST NP at bedside, orders to keep MAP 65-85 and if CVP >15, restart Epi gtt 1527: Pt's MAP remaining >90, able to wean vasopressin gtt off at this time. 1528: Doppler MAP and arterial line MAP correlating 1530: Pt's family at bedside, updated on pt's condition and plans for care overnight. Pt awakening to voice and following commands at this time. Will give 2mg  Morphine for pain. 1547: Pt restless, breathing over ventilator and becoming hypertensive, precedex gtt increased to 0.9mcg/kg/hr 1549: Dr. Maria Del Rosario Magana at bedside, orders to wean dobutamine and start milrinone gtt at 0.1mcg/kg/min if pt remains hypertensive after dobutamine turned off. Dobutatmine weaned down to 9mcg/kg/min 1607: Dobutamine gtt down to 8mcg/kg/min 1615: Pt tolerating weaning of epinephrine, MAP remains >90, epi gtt stopped at this time 1619: Dobutamine gtt down to 7mcg/kg/min 1628: Spoke with Lety Grayson., NP, clarified order for milrinone, orders to start milrinone gtt now at 0.2mcg/kg/min and wean dobutamine to 5mcg/kg/min as pt tolerates. Orders placed for prn hydralazine and prn 4mg IV morphine. 1649: BG 80, insulin gtt held, will give 40mL of D10 per Monet Woodson 1945: Bedside and Verbal shift change report given to Arthur Drake RN (oncoming nurse) by Nell Diallo RN and Norman Gaines RN (offgoing nurse). Report included the following information SBAR, ED Summary, OR Summary, Procedure Summary, Intake/Output, MAR, Recent Results, Cardiac Rhythm Paced and Alarm Parameters .

## 2019-11-18 NOTE — PROGRESS NOTES
1930 Bedside shift change report given to Mario Sunshine (oncoming nurse) by Myles Kaiser (offgoing nurse). Report included the following information SBAR, Intake/Output, MAR, Recent Results and Cardiac Rhythm Paced. 1957 Pt c/o nausea and chills. Temp 98.2 F. PRN Zofran given. 2022 Patient had one episode of vomiting. Pt states he feels better following incident. 2105 Platelet transfusion complete 2230 Repeat Hgb and Platelet level drawn. 1155 Platelet level 79. Heart Failure paged, orders received to increase Bumex gtt, 1-time dose Diuril, and for 1 unit Platelets 0010 Blood bank notified of Platelet order. States there are no bags in house and they will place stat order. 0145 Blood bank called to check status of Platelet order, Blood Bank employee states they will check status and call back. Blood products available for AM surgery reviewed. All products available other than platelets. 0217 Blood bank called to CCU to update on status of Platelets. Platelets expected to be at [facility] at 0300 
0400 AM labs drawn. 0870 Transfusion platelets started 6298 Transfusion complete 
0500 Complete Chlorhexidine bath performed. Pre-procedure checklist complete. Patient's glasses, dentures, and bag of clothing sent home with spouse. 8068 Transfusion platelets started 8342 Transfusion complete 0715 TRANSFER - OUT REPORT: 
 
Verbal report given to Nurse Anesthetist(name) on [facility]  being transferred to OR(unit) for ordered procedure Report consisted of patients Situation, Background, Assessment and  
Recommendations(SBAR). Information from the following report(s) SBAR, Intake/Output, MAR, Recent Results and Cardiac Rhythm Paced was reviewed with the receiving nurse. Lines:  
Double Lumen 11/17/19 Right Internal jugular (Active) Central Line Being Utilized Yes 11/18/2019  4:00 AM  
Criteria for Appropriate Use Hemodynamically unstable, requiring monitoring lines, vasopressors, or volume resuscitation 11/18/2019  4:00 AM  
Site Assessment Clean, dry, & intact 11/18/2019  4:00 AM  
Infiltration Assessment 0 11/18/2019  4:00 AM  
Affected Extremity/Extremities Color distal to insertion site pink (or appropriate for race); Pulses palpable;Range of motion performed 11/18/2019  4:00 AM  
Date of Last Dressing Change 11/18/19 11/18/2019  4:00 AM  
Dressing Status Clean, dry, & intact 11/18/2019  4:00 AM  
Dressing Type Disk with Chlorhexadine gluconate (CHG) 11/18/2019  4:00 AM  
Action Taken Zeroed/Rezeroed; Open ports on tubing capped 11/18/2019  4:00 AM  
Proximal Hub Color/Line Status White 11/18/2019  4:00 AM  
Positive Blood Return (Medial Site) Yes 11/18/2019  4:00 AM  
Distal Hub Color/Line Status Brown 11/18/2019  4:00 AM  
Positive Blood Return (Lateral Site) Yes 11/18/2019  4:00 AM  
Alcohol Cap Used Yes 11/18/2019  4:00 AM  
   
Rahul Self Triple 11/18/19 Right Neck (Active) Peripheral IV 11/16/19 Posterior;Right Forearm (Active) Site Assessment Clean, dry, & intact 11/18/2019  4:00 AM  
Phlebitis Assessment 0 11/18/2019  4:00 AM  
Infiltration Assessment 0 11/18/2019  4:00 AM  
Dressing Status Clean, dry, & intact 11/18/2019  4:00 AM  
Dressing Type Transparent 11/18/2019  4:00 AM  
Hub Color/Line Status Blue 11/18/2019  4:00 AM  
Action Taken Open ports on tubing capped 11/18/2019  4:00 AM  
Alcohol Cap Used Yes 11/18/2019  4:00 AM  
   
Peripheral IV 11/16/19 Left Antecubital (Active) Site Assessment Clean, dry, & intact 11/18/2019  4:00 AM  
Phlebitis Assessment 0 11/18/2019  4:00 AM  
Infiltration Assessment 0 11/18/2019  4:00 AM  
Dressing Status Clean, dry, & intact 11/18/2019  4:00 AM  
Dressing Type Transparent 11/18/2019  4:00 AM  
Hub Color/Line Status Pink 11/18/2019  4:00 AM  
Action Taken Open ports on tubing capped 11/18/2019  4:00 AM  
Alcohol Cap Used Yes 11/18/2019  4:00 AM  
   
 Arterial Line 11/18/19 Right Radial artery (Active) Arterial Line 11/18/19 Left Radial artery (Active) Opportunity for questions and clarification was provided. Patient transported with: 
 Monitor O2 @ 4 liters Registered Nurse

## 2019-11-18 NOTE — PROGRESS NOTES
Occupational Therapy Chart reviewed. Pt is currently in the OR for LVAD implantation. Will defer today and follow up for OT re-evaluation when appropriate.  Karina Rosa, OT

## 2019-11-18 NOTE — BRIEF OP NOTE
BRIEF OP NOTE Pre-Op Diagnosis: CONGESTIVE HEART FAILURE Post-Op Diagnosis: CONGESTIVE HEART FAILURE Procedure:  
REMOVAL TEMPORARY LEFT VENTRICULAR ASSIST DEVICE IMPLANTATION OF LEFT VENTRICULAR ASSIST DEVICE PERMANENT (VAD) Surgeon: Urbano Young MD 
 
Assistant(s): Mainor Dobson PA-C, Yared Bradley PA-C Anesthesia: General  
 
Infusions: Amiodarone, precedex, insulin, epi,  Estimated Blood Loss: 
1125ml Cell Saver:  
1125ml Specimens:  
ID Type Source Tests Collected by Time Destination 1 : LV APICAL CORE Fresh Heart  Colby Lozano MD 2019 8389 Pathology Drains and pacing wires: 5 real drains Complications: None Findings: HF. Implants:  
Implant Name Type Inv. Item Serial No.  Lot No. LRB No. Used Action DEVICE IMPL VENTRCLR ASST KIT -- HEARTMATE III - [id number]  DEVICE IMPL VENTRCLR ASST KIT -- HEARTMATE III [id number] [employer] N/A Left 1 Implanted GRAFT HEMSHLD PLAT [id number] --  - [id number]  GRAFT HEMSHLD PLAT [id number] --  [id number] GETINGE AB MAQUET CARDIOVASCLR [id number] Left 1 Implanted PATCH VASC FLT 1.65MM [id number] --  - SN/A  PATCH VASC FLT 1.65MM [id number] --  N/A BARD PERIPHERAL VASCULAR [id number] Left 1 Implanted BIOGLUE SURGICAL ADHESIVE   N/A  [id number] Left 2 Implanted Thoratec Heartmate 3 Outflow Graft Clip   NA  [id number] Left 1 Implanted GORETEX VASCULAR GRAFT Graft  [id number]  NA Left 1 Implanted

## 2019-11-18 NOTE — PROGRESS NOTES
ID Progress Note 2019 Subjective: In ICU setting, on high levels of support after LVAD placement Objective: Antibiotics: 1. Levaquin 2. Rifampin 3. Fluconazole 4. Vancomycin Vitals:  
Visit Vitals BP (!) 114/94 Pulse 67 Temp 98 °F (36.7 °C) Resp 18 Ht 6' 2\" (1.88 m) Wt 88.8 kg (195 lb 12.3 oz) SpO2 99% BMI 25.14 kg/m² Tmax:  Temp (24hrs), Av.2 °F (36.8 °C), Min:98 °F (36.7 °C), Max:98.7 °F (37.1 °C) Exam:  Lungs:  clear to auscultation bilaterally Heart:  regular rate and rhythm Abdomen:  soft, non-tender. Bowel sounds normal. No masses,  no organomegaly Grossly bloody urine in catheter Labs:     
Recent Labs 19 
1434 19 
1432 19 
0414 19 
2232 19 
0411 19 
8445 WBC 4.0*  --  5.0  --  3.8* 3.7* HGB 8.8*  --  9.4* 9.4* 8.4* 8.7* PLT 88*  --  90* 79* 109* 98* BUN  --  14 14  --  12 12 CREA  --  1.26 1.35*  --  1.20 1.15  
SGOT  --  50* 52*  --  20 21 AP  --  76 141*  --  124* 126* TBILI  --  3.0* 1.4*  --  0.8 0.8 Cultures: No results found for: [facility] Lab Results Component Value Date/Time Culture result: NO GROWTH 5 DAYS 2019 11:18 AM  
 Culture result: SERRATIA MARCESCENS (A) 10/31/2019 10:05 AM  
 Culture result: SERRATIA MARCESCENS (2ND COLONY TYPE/STRAIN) (A) 10/31/2019 10:05 AM  
 Culture result: ENTEROCOCCUS FAECALIS GROUP D (A) 10/31/2019 10:05 AM  
 
 
Radiology:  
 
Line/Insert Date:        
 
Assessment:  
 
1. UTI--Serratia (2 biotypes) from culture and small amount of Enterococcus 2. CHF/cardiomyopathy Objective: 1. Continue current therapy 2. LVAD placed today Courtney Lindsay MD

## 2019-11-18 NOTE — PROGRESS NOTES
Pharmacist Note - Vancomycin Dosing Consult provided for this 71 y.o. male for indication of ppx x48 hrs s/p cardiac surgery (LVAD) . Recent Labs 19 
7179 19 
0411 19 
5021 WBC 5.0 3.8* 3.7*  
CREA 1.35* 1.20 1.15  
BUN 14 12 12 Height: 188 cm Weight: 88.8 kg Est CrCl: 60 ml/min; UO: >1 ml/kg/hr Temp (24hrs), Av.2 °F (36.8 °C), Min:97.9 °F (36.6 °C), Max:98.3 °F (36.8 °C) Goal trough = 15 - 20 mcg/mL Therapy will be initiated with a loading dose of 1500 mg IV x 1 pre-op to be followed by a maintenance dose of 1250 mg IV every 16 hours x3 doses. Pharmacy to follow patient daily and order levels / make dose adjustments as appropriate.

## 2019-11-18 NOTE — ANESTHESIA PREPROCEDURE EVALUATION
Relevant Problems No relevant active problems Anesthetic History No history of anesthetic complications Review of Systems / Medical History Patient summary reviewed, nursing notes reviewed and pertinent labs reviewed Pulmonary Sleep apnea: CPAP Neuro/Psych Within defined limits Cardiovascular Hypertension CHF: NYHA Classification IV, orthopnea, PND and dyspnea on exertion Dysrhythmias : atrial fibrillation CAD and CABG Exercise tolerance: <4 METS Comments: EF 10%, on inotropes S/p Sternectomy for sternal wound infection GI/Hepatic/Renal 
  
 
 
Renal disease: CRI Endo/Other Arthritis Other Findings Physical Exam 
 
Airway Mallampati: II 
TM Distance: > 6 cm Neck ROM: normal range of motion Mouth opening: Normal 
 
 Cardiovascular Rhythm: irregular Rate: normal 
 
Murmur (impella) Dental 
 
Dentition: Full lower dentures and Full upper dentures Pulmonary Breath sounds clear to auscultation Abdominal 
GI exam deferred Other Findings Anesthetic Plan ASA: 4 Anesthesia type: general 
 
Monitoring Plan: Arterial line, BIS, CVP, [place] and JACQUE Post procedure ventilation Induction: Intravenous Anesthetic plan and risks discussed with: Patient and Family

## 2019-11-18 NOTE — DIABETES MGMT
[facility] Cardiac Surgery Progress Note Recommendations/ Comments: Pt arrived to CVICU at 1415 on 11/18/19. Consider continuing insulin gtt for at least 48hrs post-op and eating 50% solid foods then, 
1) transition off gtt per Edin Pierce Protocol 2) continue accu-checks and humalog correctional insulin ac & hs  
3) ADA/AHA diet as diet advanced 4) Pt was not on any medications PTA. May require basal insulin. Will need to determine closer to transition. Insulin gtt should not be stopped until after 1415 on 11/20/19 to complete 48hr post-op time frame. Pt could receive transition as early as 1215  if all criteria met per protocol. Currently on insulin gtt. Current drip rate running at 10.3 units/hr. Last obtained BG was 189 mg/dl at 1443. Chart reviewed on Sona Kiser. Patient is 71 y.o. male s/p Cardiac surgery -  LVAD, POD 0. Pt with no prior hx of DM A1c suggestive of new dx. A1c:  
Lab Results Component Value Date/Time Hemoglobin A1c 6.6 (H) 10/25/2019 02:56 PM  
 
 
 
Recent Glucose Results:  
Lab Results Component Value Date/Time  (H) 11/18/2019 02:32 PM  
  (H) 11/18/2019 04:14 AM  
 GLUCPOC 189 (H) 11/18/2019 02:42 PM  
 GLUCPOC 110 (H) 11/18/2019 06:14 AM  
 GLUCPOC 141 (H) 11/17/2019 09:07 PM  
  
 
Lab Results Component Value Date/Time Creatinine 1.26 11/18/2019 02:32 PM  
 
Estimated Creatinine Clearance: 64.3 mL/min (based on SCr of 1.26 mg/dL). Active Orders Diet DIET NPO  
  
 
PO intake:  
Patient Vitals for the past 72 hrs: 
 % Diet Eaten 11/16/19 0900 100 % 11/15/19 1700 100 % Will continue to follow as needed. Thank you. Chau Chapman RD, Diabetes Clinician Time spent: 5 minutes

## 2019-11-18 NOTE — ANESTHESIA PROCEDURE NOTES
Arterial Line Placement Start time: 11/18/2019 7:16 AM 
Performed by: Renny Hwang MD 
Authorized by: Renny Hwang MD  
 
Pre-Procedure Indications:  Arterial pressure monitoring Preanesthetic Checklist: patient identified, risks and benefits discussed, anesthesia consent, site marked, patient being monitored, timeout performed and patient being monitored

## 2019-11-18 NOTE — PROGRESS NOTES
[facility] Progress Note DOS:   11/18/2019 NAME:  Anabel Herrera MRN:   [id number] REFERRING PROVIDER:  Dr. Ba Chavis PRIMARY CARE PHYSICIAN: Kassidy Lane MD 
 
 
Chief Complaint:  
Cardiogenic Shock HPI: Darvin Kiser is a 71y.o. year old pleasant white male with a history of HTN, HLD, JOSE, CAD s/p cardiac arrest VFib s/p CABG (2011) c/b sternal would infection and sternectomy, ischemic cardiomyopathy LVEF 15-20%, s/p ICD and with LBBB. He was admitted with acute on chronic systolic heart failure with massive volume overload > 20 lbs, in the setting of atrial fibrillation s/p failed DCCV and underwent DCCV and RHC on 6/18.  S/p BiVICD on 6/21/19 with Dr. Bandar Carlson. He was discharged home home on IV milrinone on 6/26/19. Zoila Maldonado has been followed closely by Dr. Ba Chavis and the [facility]. 
  
Mr. Kiser was admitted for acute on chronic systolic heart failure. He underwent implantation of Impella 5.0 due to CS and has completed his LVAD evaluation. He meets criteria for implant of HM3 as DT. He was NYHA Class IV prior to implant of Impella 5.0, has LVEF < 15%, was intolerant of GDMT due to symptomatic hypotension and renal dysfunction. He remains dependent on temporary MCS support. RHC  revealed compensated hemodynamics on Impella. His renal function has improved dramatically with improvement in his hemodynamics. He is not a suitable transplant candidate due to single kidney, sternectomy, debility, and frailty. He was reviewed by [facility] and felt to be a high risk heart transplant candidate due to multiple co-morbidities as well as the afore mentioned conditions. He remained in the CCU and underwent a HM 3 implant as DT on 11/18. [facility] OR for HM 3 implant Adequate diuresis Procedure:  Procedure(s): REMOVAL TEMPORARY LEFT VENTRICULAR ASSIST DEVICE, IMPLANTATION OF LEFT VENTRICULAR ASSIST DEVICE PERMANENT (VAD), ECC, JACQUE, EPI AORTIC US BY DR Taylor Ricci. POD:  Day of Surgery Impression / Plan:  
Heart Failure Status: NYHA Class IV INTERMACS Category 2 Chronic systolic heart failure due to ICM, NYHA Class IV, EF < 15%, cardiogenic shock on Impella 5.0 support S/p Impella removal and LVAD implant Goal CVP < 15 mmHG, CI > 2, MAP ~ 65-85 mmHg On Dobutamine, Epi and vasopressin Will wean Epi, then vasopressin and Dobutamine for above parameters No BBrx while on dobutamine No AA/ARB/ARNI post op- will start as appropriate Anticoagulation for LVAD, INR goal 2-3 Will start ASA POD 1 if platelets tolerate Start coumadin and heparin POD 1 if CT output allows 
  
Acute Respiratory Failure post op Adequate ventilation and oxygenation on current settings No weaning tonight per Dr. Reynoso Lipjose david Monitor ABG- adjust settings accordingly Post op Pain Sedated on propofol PRN morphine Comfort measures CKD 3, atrophic left kidney Appreciate Nephrology assistance Hold diuretics post op- assess daily Avoid nephrotoxins Trend labs  
  
CAD s/p CABG Intolerant of ASA due to thrombocytopenia No BB while on dobutamine Hold statin due to recent hepatic failure LHC performed 11/13 - low likelihood of benefit from revascularization  
  
Hx of VF arrest s/p BiV-ICD No recent shocks Keep K > 4 and Mg > 2  
   
PAF Currently in NSR Amiodarone per post op protocol  
  
Urinary retention, c/b serratia UTI and hematuria Appreciate Urology consult Cystoscopy performed 11/13 shows catheter induced cystitis  
  
Malnutrition Appreciate Nutritionist consult Prealbumin improved to 19 Continue Marinol 2.5 mg PO BID and six small meals daily when able to take PO Advance diet as tolerated when extubated  
  
COPD Appreciate Pulmonology assistance Continue nebs PRN   
PFTs complete - unable to perform DLCO due to Impella  
  
Anemia Multifactorial, due to hemolysis + epistaxis + hematuria Intolerant of octreotide or doxycycline for AVM ppx due to prolonged QTc Transfuse for Hgb goal > 8.5 prior to OR 
  
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  
  
Hx of sternal wound infection, sternectomy Dr. Emilia Breaux has reviewed CTs Sternum closed post op- monitor  
  
Vocal cord paralysis Improved Debility Continue PT/OT  
  
Ppx Protonix PT/OT when able Post op antibiotics per routine Bowel regimen Patient seen and examined. Data and note reviewed. I have discussed and agree with the plans as noted. 71year old male with a history of ICM who was admitted with acute on chronic systolic heart failure and JUAN J on CKD. His hemodynamics improved on Impella 5.0 support and he was approved for LVAD as DT by the [facility]. He is stable post op on inotropic support and Jered. Will attempt SBT tomorrow and continue sedation/intubation this evening. Thank you for allowing us to participate in your patient's care. Mounika Saab MD, Deon Guzman Chief of Cardiology, [facility] Medical Director Calos Rueda [address] [place] [address], [address] [facility] [phone] Fax [phone] History: 
Past Medical History:  
Diagnosis Date  Degenerative disc disease, lumbar  Heart failure ([address]  High cholesterol  Hypertension  Paroxysmal atrial fibrillation ([address] 4/2/2019  Spinal stenosis Past Surgical History:  
Procedure Laterality Date  COLONOSCOPY Left 11/11/2019 COLONOSCOPY at bedside performed by Reyes Solo, MD at [address] [place] [address] Ave  HX CORONARY ARTERY BYPASS GRAFT    
 triple  HX HERNIA REPAIR    
 HX IMPLANTABLE CARDIOVERTER DEFIBRILLATOR  DE CARDIOVERSION ELECTIVE ARRHYTHMIA EXTERNAL N/A 6/10/2019 EP CARDIOVERSION performed by Mj Gamez MD at [address] [address], [facility] CATH LAB  DE CARDIOVERSION ELECTIVE ARRHYTHMIA EXTERNAL N/A 6/18/2019 EP CARDIOVERSION performed by Liza Evangelista MD at [address] [facility] CATH LAB  MA INSJ/RPLCMT PERM DFB W/TRNSVNS LDS 1/DUAL CHMBR N/A 2019 INSERT ICD BIV MULTI performed by Imelda Wallace MD at [address], [facility] CATH LAB  MA TCAT IMPL WRLS P-ART PRS SNR L-T HEMODYN MNTR N/A 2019 IMPLANT HEART FAILURE MONITORING DEVICE performed by Saundra Camara MD at [address], [facility] CATH LAB Social History Socioeconomic History  Marital status:  Spouse name: Not on file  Number of children: Not on file  Years of education: Not on file  Highest education level: Not on file Occupational History  Not on file Social Needs  Financial resource strain: Not on file  Food insecurity:  
  Worry: Not on file Inability: Not on file  Transportation needs:  
  Medical: Not on file Non-medical: Not on file Tobacco Use  Smoking status: Former Smoker Last attempt to quit: 2010 Years since quittin.9  Smokeless tobacco: Never Used Substance and Sexual Activity  Alcohol use: Not Currently Comment: rarely  Drug use: Never  Sexual activity: Not on file Lifestyle  Physical activity:  
  Days per week: Not on file Minutes per session: Not on file  Stress: Not on file Relationships  Social connections:  
  Talks on phone: Not on file Gets together: Not on file Attends Caodaism service: Not on file Active member of club or organization: Not on file Attends meetings of clubs or organizations: Not on file Relationship status: Not on file  Intimate partner violence:  
  Fear of current or ex partner: Not on file Emotionally abused: Not on file Physically abused: Not on file Forced sexual activity: Not on file Other Topics Concern [address] Service Not Asked  Blood Transfusions Not Asked  Caffeine Concern Not Asked  Occupational Exposure Not Asked [employer] Hazards Not Asked  Sleep Concern Not Asked  Stress Concern Not Asked  Weight Concern Not Asked  Special Diet Not Asked  Back Care Not Asked  Exercise Not Asked  Bike Helmet Not Asked  Seat Belt Not Asked  Self-Exams Not Asked Social History Narrative  Not on file Family History Problem Relation Age of Onset  Lung Disease Mother  Hypertension Mother [facility] Arthritis-osteo Mother  Heart Disease Mother  Heart Disease Father  Diabetes Father Current Medications:  
Current Facility-Administered Medications Medication Dose Route Frequency Provider Last Rate Last Dose  
 0.9% sodium chloride infusion  9 mL/hr IntraVENous CONTINUOUS Delia Herrera NP      
 0.45% sodium chloride infusion  10 mL/hr IntraVENous CONTINUOUS KatelynndDelia NP      
 sodium chloride (NS) flush 5-40 mL  5-40 mL IntraVENous Q8H Delia Herrera NP      
 sodium chloride (NS) flush 5-40 mL  5-40 mL IntraVENous PRN Delia Herrera NP      
 acetaminophen (TYLENOL) tablet 650 mg  650 mg Oral Q6H PRN Delia Herrera NP      
 oxyCODONE IR (ROXICODONE) tablet 5 mg  5 mg Oral Q4H PRN Delia Herrera NP      
 oxyCODONE IR (ROXICODONE) tablet 10 mg  10 mg Oral Q4H PRN Delia Herrera NP      
 morphine injection 2 mg  2 mg IntraVENous Q2H PRN Delia Herrera NP      
 naloxone [facility]) injection 0.4 mg  0.4 mg IntraVENous PRN Delia Herrera NP      
 mupirocin (BACTROBAN) 2 % ointment   Both Nostrils BID Delia Herrera NP      
 [START ON 11/19/2019] levoFLOXacin (LEVAQUIN) 500 mg in D5W IVPB  500 mg IntraVENous Q24H Delia Herrera NP      
[facility] [START ON 11/19/2019] rifAMPin (RIFADIN) 600 mg in 0.9% sodium chloride 100 mL IVPB  600 mg IntraVENous DAILY Delia Herrera NP      
 [START ON 11/19/2019] fluconazole (DIFLUCAN) 200mg/100 mL IVPB (premix)  200 mg IntraVENous Q24H Delia Herrera NP      
  ondansetron (ZOFRAN) injection 4 mg  4 mg IntraVENous Q4H PRN Win Herrera NP      
 albuterol-ipratropium (DUO-NEB) 2.5 MG-0.5 MG/3 ML  3 mL Nebulization Q6H PRN Win Herrera NP      
 midazolam (VERSED) injection 1 mg  1 mg IntraVENous Q1H PRN Win Herrera NP      
 chlorhexidine (PERIDEX) 0.12 % mouthwash 10 mL  10 mL Oral BID Win Herrera NP      
 [START ON 11/19/2019] senna-docusate (PERICOLACE) 8.6-50 mg per tablet 1 Tab  1 Tab Oral DAILY Win Herrera NP      
 [START ON 11/19/2019] polyethylene glycol (MIRALAX) packet 17 g  17 g Oral DAILY Win Herrera NP      
 bisacodyl (DULCOLAX) tablet 5 mg  5 mg Oral DAILY PRN Win Herrera NP      
 acetaminophen (OFIRMEV) infusion 1,000 mg  1,000 mg IntraVENous Q6H PRN Win Herrera NP      
 pantoprazole (PROTONIX) 40 mg in sodium chloride 0.9% 10 mL injection  40 mg IntraVENous Q12H Win Herrera NP      
 sucralfate (CARAFATE) tablet 1 g  1 g Oral AC&HS Win Herrera NP      
 amiodarone (CORDARONE) injection 300 mg  300 mg IntraVENous ONCE Heide Andrews MD      
 0.9% sodium chloride infusion 250 mL  250 mL IntraVENous PRN Xu Dong MD      
 Vancomycin - Pharmacy dosing   Other Rx Dosing/Monitoring Morgan Del Toro NP      
 influenza vaccine 2019-20 (6 mos+)(PF) (FLUARIX/FLULAVAL/FLUZONE QUAD) injection 0.5 mL  0.5 mL IntraMUSCular PRIOR TO DISCHARGE Jane Robledo MD      
 [START ON 11/19/2019] vancomycin (VANCOCIN) 1250 mg in  ml infusion  1,250 mg IntraVENous Q16H Win Herrera NP      
 DOBUTamine (DOBUTREX) 1,000 mg/250 mL (4,000 mcg/mL) infusion  0-10 mcg/kg/min IntraVENous TITRATE Polliard, Win Sill, NP      
 nitroglycerin (Tridil) 200 mcg/ml infusion  0-200 mcg/min IntraVENous TITRATE Polliard, Win Sill, NP      
 amiodarone (CORDARONE) 900 mg/250 ml D5W infusion  0.5 mg/min IntraVENous CONTINUOUS Polliard, Win Sill, NP 16.7 mL/hr at 11/18/19 0928 1 mg/min at 11/18/19 4239  dexmedeTOMidine (PRECEDEX) 400 mcg in 0.9% sodium chloride 100 mL infusion  0.1-0.9 mcg/kg/hr IntraVENous TITRATE Mary Herrera NP 13.8 mL/hr at 11/18/19 1202 0.6 mcg/kg/hr at 11/18/19 1202  EPINEPHrine (ADRENALIN) 10 mg in 0.9% sodium chloride 250 mL infusion  0-10 mcg/min IntraVENous TITRATE Mary Herrera NP 7.5 mL/hr at 11/18/19 1113 5 mcg/min at 11/18/19 1113  
 insulin regular (NOVOLIN R, HUMULIN R) 100 Units in 0.9% sodium chloride 100 mL infusion  1-50 Units/hr IntraVENous TITRATE Mary Herrera NP 8.1 mL/hr at 11/18/19 1301 8.1 Units/hr at 11/18/19 1301  
 niCARdipine (CARDENE) 25 mg in 0.9% sodium chloride 250 mL infusion  0-15 mg/hr IntraVENous TITRATE Mary Herrera NP      
 NOREPINephrine (LEVOPHED) 32 mg in dextrose 5% 250 mL (128 mcg/mL) infusion  0.5-16 mcg/min IntraVENous TITRATE Mary Herrera NP   Stopped at 11/18/19 1150  PHENYLephrine (JUAN PABLO-SYNEPHRINE) 100 mg in 0.9% sodium chloride 250 mL infusion   mcg/min IntraVENous TITRATE Mary Herrera NP      
 vasopressin (VASOSTRICT) 20 Units in 0.9% sodium chloride 100 mL infusion  0-0.04 Units/min IntraVENous TITRATE Mary Herrera NP 6 mL/hr at 11/18/19 1208 0.02 Units/min at 11/18/19 1208  ELECTROLYTE REPLACEMENT NOTE: Nurse to review Serum Potassium and Magnesuim levels and Initiate Electrolyte Replacement Protocol as needed  1 Each Other PRN Jessica Zaragoza NP Allergies: No Known Allergies ROS:   
Review of Systems Unable to perform ROS: Acuity of condition Physical Exam:  
Physical Exam  
Constitutional: He appears well-developed and well-nourished. HENT:  
intubated Neck: Neck supple. Cardiovascular: Normal rate, regular rhythm and normal heart sounds. + VAD hum Pulmonary/Chest: On vent Abdominal: Soft. Bowel sounds are normal. He exhibits no distension. Musculoskeletal: He exhibits edema. R > L Neurological:  
Sedated Skin: Skin is warm and dry. Nursing note and vitals reviewed. Vitals:  
Visit Vitals BP (!) 114/94 Pulse 94 Temp 98.2 °F (36.8 °C) Resp 18 Ht 6' 2\" (1.88 m) Wt 195 lb 12.3 oz (88.8 kg) SpO2 96% BMI 25.14 kg/m² Temp (24hrs), Av.2 °F (36.8 °C), Min:97.9 °F (36.6 °C), Max:98.3 °F (36.8 °C) Hemodynamics: 
 CO: CO (l/min): 8.3 l/min CI: CI (l/min/m2): 4 l/min/m2 CVP: CVP (mmHg): 9 mmHg (19) SVR: SVR (dyne*sec)/cm5: 781 (dyne*sec)/cm5 (19 1500) PAP Systolic: PAP Systolic: 57 (25/56/08 6988) PAP Diastolic: PAP Diastolic: 26 (77/96/39 5873) PVR:   
 SV02: SVO2 (%): 51 % (19 1500) SCV02: SCVO2 (%): 75 % (10/29/19 1900) Admission Weight: Last Weight Weight: 192 lb 10.9 oz (87.4 kg) Weight: 195 lb 12.3 oz (88.8 kg) Intake / Output / Drain: 
Last 24 hrs.:  
 
Intake/Output Summary (Last 24 hours) at 2019 1444 Last data filed at 2019 3410 Gross per 24 hour Intake 5496.03 ml Output 8170 ml Net -2673.97 ml Drains: NA 
24h: 
8h: 
 
Ventilator: 
Ventilator Volumes Vt Set (ml): 550 ml (19 143) Vt Exhaled (Machine Breath) (ml): 551 ml (19) Vt Spont (ml): 476 ml (10/30/19 0846) Ve Observed (l/min): 9.91 l/min (19 143) Extubation Date / Time:  NA Oxygen Therapy: 
Oxygen Therapy O2 Sat (%): 96 % (19 0700) Pulse via Oximetry: 95 beats per minute (19 1430) O2 Device: Other (comment)(ETT) (19 1425) O2 Flow Rate (L/min): 4 l/min (19 0400) FIO2 (%): 100 % (19 1434) VAD Data: LVAD (Heartmate) Pump Speed (RPM): 5200 Pump Flow (LPM): 3.74 
PI (Pulsitility Index): 5.7 Power: 3.5  Test: No 
Back Up  at Bedside & Labeled: Yes Power Module Test: No 
Driveline Site Care: No 
Driveline Dressing: Clean, Dry, and Intact Drive Line Exam: 
Stabilization device intact: yes Line inspected for damage: yes Appearance: no edema, erythema, drainage Stabilization Method: anchor Recent Labs:  
Labs Latest Ref Rng & Units 11/18/2019 11/17/2019 11/17/2019 11/16/2019 11/15/2019 11/14/2019 11/13/2019 WBC 4.1 - 11.1 K/uL 5.0 - 3. 8(L) 3. 7(L) 3. 7(L) 3. 7(L) 3.8(L)  
RBC 4.10 - 5.70 M/uL 3.12(L) - 2.79(L) 2.88(L) 2.74(L) 2.69(L) 2.86(L) Hemoglobin 12.1 - 17.0 g/dL 9.4(L) 9.4(L) 8.4(L) 8.7(L) 8. 1(L) 8.0(L) 8.5(L) Hematocrit 36.6 - 50.3 % 30. 6(L) - 27. 6(L) 28. 5(L) 27. 3(L) 26. 7(L) 27. 5(L) MCV 80.0 - 99.0 FL 98.1 - 98.9 99.0 99. 6(H) 99. 3(H) 96.2 Platelets 834 - 213 K/uL 90(L) 79(L) 109(L) 98(L) 90(L) 82(L) 105(L) Lymphocytes 12 - 49 % - - - - - - - Monocytes 5 - 13 % - - - - - - - Eosinophils 0 - 7 % - - - - - - - Basophils 0 - 1 % - - - - - - - Albumin 3.5 - 5.0 g/dL 2. 8(L) - 2. 6(L) 2. 5(L) 2. 4(L) 2. 6(L) 2. 3(L) Calcium 8.5 - 10.1 MG/DL 8.6 - 8.4(L) 8.4(L) 8.6 8.5 8.6 SGOT 15 - 37 U/L 52(H) - 20 21 20 22 25 Glucose 65 - 100 mg/dL 109(H) - 77 77 88 84 87 BUN 6 - 20 MG/DL 14 - 12 12 13 12 12 Creatinine 0.70 - 1.30 MG/DL 1.35(H) - 1.20 1.15 1.17 1.28 1.31(H) Sodium 136 - 145 mmol/L 135(L) - 136 135(L) 136 134(L) 133(L) Potassium 3.5 - 5.1 mmol/L 3.5 - 3.6 3.8 4.1 3.9 3.8 TSH 0.36 - 3.74 uIU/mL - - - - - - -  
PSA 0.01 - 4.0 ng/mL - - - - - - -  
LDH 85 - 241 U/L 910(H) - 439(H) 421(H) 427(H) 410(H) 437(H) CEA ng/mL - - - - - - - Some recent data might be hidden EKG:  
EKG Results Procedure 720 Value Units Date/Time EKG, 12 LEAD, INITIAL [[id number]] Order Status:  Sent EKG, 12 LEAD, INITIAL [[id number]] Order Status:  Canceled EKG, 12 LEAD, INITIAL [[id number]] Order Status:  Canceled EKG, 12 LEAD, INITIAL [[id number]] Order Status:  Canceled EKG, 12 LEAD, INITIAL [[id number]] Order Status:  Canceled EKG, 12 LEAD, INITIAL [[id number]] Order Status:  Canceled EKG, 12 LEAD, INITIAL [[id number]] Collected:  11/13/19 0440 Order Status:  Completed Updated:  11/13/19 2200 Ventricular Rate 74 BPM   
  Atrial Rate 66 BPM   
  QRS Duration 166 ms   
  Q-T Interval 488 ms QTC Calculation (Bezet) 541 ms Calculated R Axis -15 degrees Calculated T Axis 157 degrees Diagnosis -- Electronic ventricular pacemaker When compared with ECG of 11-NOV-2019 04:49, No significant change was found Confirmed by Era Her M.D., Tristian Lucas ([id number]) on 11/13/2019 10:00:38 PM 
  
 EKG, 12 LEAD, INITIAL [[id number]] Order Status:  Canceled EKG, 12 LEAD, INITIAL [[id number]] Collected:  11/11/19 0449 Order Status:  Completed Updated:  11/11/19 3997 Ventricular Rate 74 BPM   
  Atrial Rate 39 BPM   
  QRS Duration 158 ms Q-T Interval 504 ms QTC Calculation (Bezet) 559 ms Calculated R Axis 13 degrees Calculated T Axis 175 degrees Diagnosis -- Electronic ventricular pacemaker When compared with ECG of 05-NOV-2019 10:44, 
Vent. rate has decreased BY   9 BPM 
Confirmed by Era Her M.D., Tristian Lucas ([id number]) on 11/11/2019 8:54:27 AM 
  
 EKG, 12 LEAD, INITIAL [[id number]] Order Status:  Canceled EKG, 12 LEAD, INITIAL [[id number]] Order Status:  Canceled EKG, 12 LEAD, INITIAL [[id number]] Order Status:  Canceled EKG, 12 LEAD, INITIAL [[id number]] Collected:  11/05/19 1044 Order Status:  Completed Updated:  11/05/19 1226 Ventricular Rate 83 BPM   
  Atrial Rate 83 BPM   
  P-R Interval 80 ms QRS Duration 162 ms Q-T Interval 502 ms QTC Calculation (Bezet) 589 ms Calculated R Axis 2 degrees Calculated T Axis -156 degrees Diagnosis -- Electronic ventricular pacemaker Marked T wave abnormality, consider  
anterolateral ischemia When compared with ECG of 02-NOV-2019 10:42, No significant change was found Confirmed by Akosua Lilly M.D., Tory Zavala ([id number]) on 11/5/2019 12:25:52 PM 
  
 EKG, 12 LEAD, INITIAL [[id number]] Collected:  11/02/19 1042 Order Status:  Completed Updated:  11/03/19 3885 Ventricular Rate 91 BPM   
  Atrial Rate 91 BPM   
  P-R Interval 108 ms QRS Duration 148 ms Q-T Interval 524 ms QTC Calculation (Bezet) 644 ms Calculated R Axis 5 degrees Calculated T Axis -152 degrees Diagnosis --  
  Sinus rhythm with short VT with occasional premature ventricular complexes  
and fusion complexes Nonspecific intraventricular block Marked T wave abnormality, consider anterolateral ischemia When compared with ECG of 26-OCT-2019 06:55, Sinus rhythm has replaced Electronic ventricular pacemaker Prolonged QT Confirmed by Kaur Pastrana ([id number]) on 11/3/2019 6:35:23 AM 
  
 EKG, 12 LEAD, INITIAL [[id number]] Order Status:  Canceled EKG, 12 LEAD, INITIAL [[id number]] Collected:  10/26/19 3028 Order Status:  Completed Updated:  10/26/19 1807 Ventricular Rate 80 BPM   
  Atrial Rate 76 BPM   
  QRS Duration 174 ms Q-T Interval 462 ms QTC Calculation (Bezet) 532 ms Calculated R Axis 28 degrees Calculated T Axis 150 degrees Diagnosis -- Atrial flutter Left bundle branch block When compared with ECG of 25-OCT-2019 18:20, 
Electronic demand pacing is not noted Confirmed by Chelo Barrientos ([id number]) on 10/26/2019 6:07:36 PM 
  
 EKG, 12 LEAD, INITIAL [[id number]] Collected:  10/25/19 1820 Order Status:  Completed Updated:  10/26/19 1749 Ventricular Rate 72 BPM   
  Atrial Rate 72 BPM   
  P-R Interval 86 ms QRS Duration 116 ms   
  Q-T Interval 506 ms QTC Calculation (Bezet) 554 ms Calculated P Axis 9 degrees Calculated R Axis -68 degrees Calculated T Axis 10 degrees Diagnosis --  
  undetermied rhythm possible atrial flutter Nonspecific intraventricular conduction delay Ventricular paced rhythm When compared with ECG of 26-JUN-2019 11:39, 
Significant changes have occurred Confirmed by Chelo Barrientos ([id number]) on 10/26/2019 5:49:02 PM 
  
  
 No specialty comments available. 10/25/19 OR ECHO JACQUE INTRAOP  11/18/2019 Narrative See Anesthesia Procedure note for procedure details. Signed by:   
  
Echo Results  (Last 48 hours) None CXR Results  (Last 48 hours)  
          
 11/18/19 0445  XR CHEST PORT Final result Impression:  IMPRESSION:  
1. Increasing bilateral pleural effusions with increasing bibasilar atelectasis. 2. No change mild pulmonary edema Narrative:  EXAM: XR CHEST PORT INDICATION: postop heart cardiac assist device COMPARISON: 11/17/2019 FINDINGS: A portable AP radiograph of the chest was obtained at 0359 hours. The  
patient is on a cardiac monitor. Right IJ catheter overlies the SVC. Cardiac  
assist device is in satisfactory position. ICD is in satisfactory position. Overall aeration has decreased. There are small pleural effusions which have  
increased. There is bibasilar atelectasis which has increased. Pulmonary edema  
pattern is unchanged. No pneumothorax. 11/17/19 1150  XR CHEST PORT Final result Impression:  IMPRESSION:  
1. No complicating features post line placement. Narrative:  INDICATION: Line placement EXAM: Portable chest 11:30 hours . Comparison 11/17/2019. FINDINGS: A right neck central venous catheter has been placed. Tip overlies the  
superior vena cava. Cardiac silhouette remains enlarged. Impella and AICD  
unchanged. Mild interstitial prominence unchanged. No pneumothorax or effusion 11/17/19 0431  XR CHEST PORT Final result Impression:  IMPRESSION:  
1. No interval change Narrative:  INDICATION:  postop heart EXAM: Portable chest 0403 . Comparison 11/16/2019. FINDINGS: There is no significant change in appearance the lungs. Cardiomediastinal silhouette is unchanged. No pneumothorax. Right arm PICC line is been removed. Otherwise, lines and tubes are unchanged in position. Merlene Allen, TIERRA Rueda [address], [address] [place] [address] Office [phone] Fax [phone] 
24 hour VAD/HF Pager: [phone]

## 2019-11-18 NOTE — PROGRESS NOTES
Physical Therapy 11/18/2019 Chart reviewed. Patient off the floor and in OR at this time for LVAD implantation. F/u tomorrow as medically appropriate for PT re-evaluation. Thank you.  
 
Claire Jacobo, PT, DPT

## 2019-11-18 NOTE — PROGRESS NOTES
08:00 - 09:30 (90) 12:15 - 15:30 (195) NYHA class IV A/C systolic heart failure Low EF (10%) Inotrope dependent Malnutrition  
LVAD Work-up Epistaxis Hematuria 
  
Op Plan: 
1) Will not need sternal saw - can go right to incision and dissection (need ANDREA bar's) 2) Dissection:  
            Z. The RV rises above level of posterior sternal plate inferiorly (start high) 
            B. The innominate vein is unusually low (can feel for the wires) 
            C. Will likely need to incorporate proximal CABG anastomosis in side-biter                  for outflow graft             C. LIMA to LAD graft is fairly lateral and should be out of the way             E. Will need to get aorta and LV apex out as usual 
            F. Place drive-line (somewhat thin adipose tissue) 3) Cannulation (give heparin): 
            X. Left groin 25 Fr venous (will save the right in case we need RVAD;  
                MST place triple lumen early) 
            B. Build 10 mm graft side-arm off Impella graft (glue; will need to extend                  axiallary graft back); have 3/8 connector already attached  
4) Implant: 
            Q. Remove Impella, de-clot graft (#4 Shakila), back-flush, hook up to bypass  
                AXKYGKO, go on bypass (place extra sucker in axillary wound             S. Packs, find apex, and core (remove trabeculations; suture in CO2 and                  pump sucker lines) 
            C. Place (4) 2-0 Ethibond sutures, bring them through inflow cannula sewing  
                 ring, and tie down 
            D. Place (2) 4-0 SH running sutures, bioglue             E. Attach pump, remove packs, and lower into well 
            F. Clamp outflow graft, turn on drips, and turn on pump at 3000 
            G. Measure outflow graft (add 1 cm), side-biter, aortotomy, and outflow graft                  anastomosis             H. Place de-airing stitch and remove clamps  
             I. Come off bypass and up on LVAD as usual 
            J. Staple off axillary graft  
  
Chest / Abdominal CT scan:  
  
Sternum is  although it looks like he still has sternal bone; the right half is higher than the left half and will need to some down; should be able to close with 12 standard wires although the sternum is thin; back-up plan would be a weave  
  
RV is coming above the sternal edge somewhat at the inferior margin so need to be careful here 
  
Proximal comes off anterior portion of the aorta; may need to place side-biter such that it incorporates the proximal; needs a [facility] to figure out which way the graft is going; no significant calcium in the ascending aorta; Impella in place  
  
LIMA to LAD graft is fairly lateral so should be out of the way for dissection  
  
Right atrium looks a little big 
  
Innominate vein is a little low so need to be carefull with dissection (should be able to feel the pace leads)  
  Bilateral common iliac arteries are severely calcified and narrowed down into the 4 mm range; will be somewhat hazardous trying to place femoral arterial cannula; will be best to extend out impella graft, build 10 mm side-arm, and go on through the axillary artery; will need to flush out the graft after we pull the Impella and before going on bypass (timing will be important here); will need to place the femoral venous line first 
  
Abdominal wall adipose tissue is thin so need to be careful with drive-line  
  
LHC - pending 
  
TTE - severely dilated LV cavity (7.36 cm) with reduced EF (10%); mild MR, large left atrium; previous TTE's did not reveal any AI; has moderate RV dysfunction (RVIDD 5.4, RV/LV ratio 0.73, TAPSE 1.4, mild TR; good free wall motion); little worried that sternal closure with compress his RV so will be prepare for temp RVAD support  
  
Carotids - normal 
  
ABIs - normal  
  
PFT's - FEV-1 2.25 (59% predicted)  
  
Epistaxis - resolved  
  
 Hematuria - resolved  
  
Hgb 8.2, platelets 98, INR 1.1 
  
BUN 15, creatinine 1.13 
  
Bilirubin 1.3, ALT 28, AST 35, alk phos 130 
  
LDH - 438, lactic acid 0.9 
  
NT pro-BNP - 7103  
  
CEA - 6.6 (mildy elevated)  
  
CXR - mild pulmonary edema 
  
Impella - flow 3.5 L/min @ P-6 
 
08:00 - here for high risk intubation; issues with radial a-line 08:15 - going over JACQUE 
 
08:30 - moderate RV dysfunction 08:45 - platelets still low - will give another one now 09:00 - will plan on giving some FFP when we come off bypass 09:15 - going over cannulation plan with team  
 
12:15 - LV cavity a little small; coming down on LVAD speed 12:30 - giving platelets; bringing LVAD speed down 12:45 - giving DDAVP 
 
13:00 - giving k centra 13:15 - giving another platelet 13:30 - going over JACQUE; no big effusions; ok to head up 13:45 - tracking down family 14:00 - family updated 14:15 - back in CVI; bleeding looks better 14:30 - MAPs elevated coming down on epinephrine 14:45 - given RV issues will leave on inhaled nitric oxide overnight 15:00 - flows look good; CVP running a little low; may need an albumin or 2 
 
15:15 - family into visit; seem to be moving everything CXR - mild pulmonary edema Visit Vitals BP (!) 114/94 Pulse 75 Temp 98.7 °F (37.1 °C) Resp 18 Ht 6' 2\" (1.88 m) Wt 195 lb 12.3 oz (88.8 kg) SpO2 98% BMI 25.14 kg/m² LVAD Pump Speed (RPM): 7050 Pump Flow (LPM): 3.5 MAP: 88 
PI (Pulsitility Index): 5.3 Power: 3.5  Test: Yes 
Back Up  at Bedside & Labeled: Yes Power Module Test: Yes Driveline Site Care: No 
Driveline Dressing: Clean, Dry, and Intact Outpatient: No 
MAP in Therapeutic Range (Outpatient): No 
Testing Alarms Reviewed: Yes 
Back up SC speed: 4800 Back up Low Speed Limit: 4800 Emergency Equipment with Patient?: Yes Emergency procedures reviewed?: Yes Drive line site inspected?: No 
 Drive line intergrity inspected?: Yes Drive line dressing changed?: No 
 
 
 
Intake/Output Summary (Last 24 hours) at 11/18/2019 1535 Last data filed at 11/18/2019 1515 Gross per 24 hour Intake 5335.03 ml Output 8380 ml Net -3044.97 ml Visit Vitals BP (!) 114/94 Pulse 75 Temp 98.7 °F (37.1 °C) Resp 18 Ht 6' 2\" (1.88 m) Wt 195 lb 12.3 oz (88.8 kg) SpO2 98% BMI 25.14 kg/m² Risk of morbidity and mortality - high Medical decision making - high complexity Total critical care time - 285 minutes (CPT 78322, 99292 x 8)

## 2019-11-18 NOTE — ANESTHESIA PROCEDURE NOTES
Central Line Placement Start time: 11/18/2019 8:01 AM 
End time: 11/18/2019 8:09 AM 
Performed by: Jacob Boyd MD 
Authorized by: Jacob Boyd MD  
 
Indications: vascular access, central pressure monitoring and need for vasopressors Preanesthetic Checklist: patient identified, risks and benefits discussed, anesthesia consent, site marked, patient being monitored and timeout performed Pre-procedure: All elements of maximal sterile barrier technique followed? Yes   
2% Chlorhexidine for cutaneous antisepsis, Hand hygiene performed prior to catheter insertion and Ultrasound guidance Sterile Ultrasound Technique followed?: Yes Procedure:  
Prep:  Chlorhexidine Orientation:  Right Patient position:  Flat Catheter type:  Quad lumen Catheter size:  8.5 Fr Catheter length:  16 cm Number of attempts:  1 Successful placement: Yes Assessment:  
Post-procedure:  Catheter secured and sterile dressing with CHG applied Assessment:  Blood return through all ports Insertion:  Uncomplicated Patient tolerance:  Patient tolerated the procedure well with no immediate complications

## 2019-11-18 NOTE — PROGRESS NOTES
Cardiac Surgery Care Coordinator- Unable to locate family at this time. 1000- Placed call to [address] [facility] wife Gorge Nelson, reviewed plan of care and offered emotional support. Will continue to follow. 1200-Met with family of Jimmy Javier, provided family with update. Family without questions or concerns at this time. Will continue to follow for educational and emotional needs. 1400-Met with [address] [facility] family and Dr. Freddie Peabody. Update given, Encouraged family to verbalize and offered emotional support. Reinforced Surgical waiting room instructions, family to wait in the main surgical waiting room until contacted by the nursing staff. 0- Escorted family to the bedside in CVICU, reviewed plan of care and offered emotional support. Family will be going home for the evening.   Al Nino RN

## 2019-11-18 NOTE — ANESTHESIA PROCEDURE NOTES
JACQUE 
Date/Time: 11/18/2019 1:55 PM 
 
 
Procedure Details: probe placement, image aquisition & interpretation Risks and benefits discussed with the patient and plans are to proceed Procedure Note Performed by: Abisai Lu MD 
Authorized by: Abisai Lu MD  
 
 
Indications: assessment of ascending aorta and assessment of surgical repair Modalities: 2D, CF, CWD, PWD Probe Type: multiplane Insertion: atraumatic Patient Status: intubated and sedated Echocardiographic and Doppler Measurements Aorta  Size  Diam(cm)  Dissection PlaqueThick(mm)  Plaque Mobile Ascending normal  No 0-3 No  
 Arch normal  No 0-3 No  
 Descending normal  No 0-3 No  
 
 
 
 Valves  Annulus  Stenosis  Area/Grad  Regurg  Leaflet Morph  Leaflet Motion Aortic normal none Impella 3.2 cm across valve 1+ normal normal  
 Mitral dilated none  2+ normal normal  
 Tricuspid dilated none  2+ normal normal  
 
 
 
 Atria  Size  SEC (smoke)  Thrombus  Tumor  Device Rt Atrium dilated No No No No  
 Lt Atrium dilated Yes No No No  
 
Interatrial Septum Morphology: normal 
 
Interventricular Septum Morphology: normal 
 
Ventricle  Cavity Size  Cavity Dimension Hypertrophy  Thrombus  Gloal FXN  EF  
 RV dilated  No no mildly impaired, moderately impaired LV dilated   No severely impaired 10-15% Regional Function 
(1 = normal, 2 = mildly hypokinetic, 3 = severely hypokinetic, 4 = akinetic, 5 = dyskinetic) LAV - [place] ME LAV = 0  ME LAV = 90  ME LAV = 130 Basal Sept:3 Basal Ant:3 Basal Post:4 Mid Sept:3 Mid Ant:3 Mid Post:4 Apical Sept:3 Apical Ant:3 Basal Ant Sept:3 Basal Lat:3 Basal Inf:3 Mid Ant Sept:3 Mid Lat:3 Mid Inf:3 Apical Lat:3 Apical Inf:3   
 
 
Pericardium: normal 
 
Post Intervention Follow-up Study Ventricular Global Function: unchanged Ventricular Regional Function: unchanged Valve  Function  Regurgitation  Area Aortic improved 0 Mitral no change Tricuspid no change Prosthetic Complications: None Comments: HM 3 LVAD placed with the inflow cannula well positioned in the apex with good inflow on CFD, good outflow noted in the ascending aorta, the aortic valve remains in the closed position without any evidence of aortic insufficiency, no atrial level shunt with CFD and a negative bubble study.

## 2019-11-19 NOTE — ANESTHESIA POSTPROCEDURE EVALUATION
Post-Anesthesia Evaluation and Assessment Patient: Sancho De Luna MRN: [id number]  SSN: [id number] YOB: 1950  Age: 71 y.o. Sex: male I have evaluated the patient and they are stable and ready for discharge from the PACU. Cardiovascular Function/Vital Signs Visit Vitals BP (!) 114/94 Pulse 85 Temp 36.9 °C (98.4 °F) Resp 21 Ht 6' 2\" (1.88 m) Wt 90.8 kg (200 lb 3.2 oz) SpO2 95% BMI 25.70 kg/m² Patient is status post General anesthesia for Procedure(s): REMOVAL TEMPORARY LEFT VENTRICULAR ASSIST DEVICE, IMPLANTATION OF LEFT VENTRICULAR ASSIST DEVICE PERMANENT (VAD), ECC, JACQUE, EPI AORTIC US BY DR Kermit Alvarado. Brina Pass Nausea/Vomiting: None Postoperative hydration reviewed and adequate. Pain: 
Pain Scale 1: Adult Nonverbal Pain Scale (11/19/19 0800) Pain Intensity 1: 0 (11/19/19 0800) Managed Neurological Status:  
Neuro Neurologic State: Eyes open spontaneously; Pharmacologically induced (comment) (11/19/19 0800) Orientation Level: Unable to verbalize(sedated/intubated) (11/19/19 0800) Cognition: Follows commands;Decreased attention/concentration;Recognition of people/places (11/19/19 0800) Speech: Intubated (11/19/19 0800) Assessment L Pupil: Sluggish;Round (11/19/19 0800) Size L Pupil (mm): 3 (11/19/19 0800) Assessment R Pupil: Sluggish;Round (11/19/19 0800) Size R Pupil (mm): 3 (11/19/19 0800) LUE Motor Response: Purposeful;Spontaneous  (11/19/19 0800) LLE Motor Response: Purposeful;Spontaneous  (11/19/19 0800) RUE Motor Response: Purposeful;Spontaneous  (11/19/19 0800) RLE Motor Response: Purposeful;Spontaneous  (11/19/19 0800) At baseline Mental Status, Level of Consciousness: Alert and  oriented to person, place, and time Pulmonary Status:  
O2 Device: Endotracheal tube;Ventilator (11/19/19 0800) Adequate oxygenation and airway patent Complications related to anesthesia: None Post-anesthesia assessment completed. No concerns Signed By: Key James MD   
 November 19, 2019 Procedure(s): REMOVAL TEMPORARY LEFT VENTRICULAR ASSIST DEVICE, IMPLANTATION OF LEFT VENTRICULAR ASSIST DEVICE PERMANENT (VAD), ECC, JACQUE, EPI AORTIC US BY DR Kermit Alvarado. Brina Pass general 
 
<BSHSIANPOST> Vitals Value Taken Time BP Temp Pulse 85 11/19/2019 11:20 AM  
Resp 25 11/19/2019 11:20 AM  
SpO2 95 % 11/19/2019 11:20 AM  
Vitals shown include unvalidated device data.

## 2019-11-19 NOTE — PROGRESS NOTES
0745: Bedside and Verbal shift change report given to Alireza Matos RN (oncoming nurse) by Daniella Villanueva RN (offgoing nurse). Report included the following information SBAR, ED Summary, OR Summary, Procedure Summary, Intake/Output, MAR, Recent Results, Cardiac Rhythm Paced and Alarm Parameters . 0805: Dr. Fam Jensen at bedside, Jered weaned down to 1 
 
0825: Dr. Fam Jensen at bedside, FiO2 decreased to 50% 7960: [address] [facility] in interrogate AICD 
 
0920: Dr. Fam Jensen at bedside, orders to turn Jered off. RT paged to turn off 
 
0930: RT at bedside to turn Jered off 
 
0935: HF at bedside, orders received to wean dobutamine as pt tolerates, give 2mg IV bumex for blood administration 0945: Dr. Fam Jensen at bedside, orders to place pt on CPAP trial 
 
0950: Precedex gtt decreased to 5mcg/kg/hr 1000: Precedex gtt turned off 
 
1028: RT paged to obtain CPAP ABG  
 
1038: CPAP ABG:  PH: 7.477, pCO2: 35.6, pO2: 158, HCO3: 26.3, s02: 100. Orders to extubate per Dr. Fam Jensen 1045: Pt extubated to 6L NC 
 
1130: Informed Dr. Fam Jensen of pt's numbness and dusky coloring of pt's LUE, orders to pull L. Radial arterial line. Will continue to monitor 1200: Pt passes dysphagia screen 1305: Rojas Sena NP at bedside, orders to start bival gtt now, will check PTT 
 
1429: Dr. Fam Jensen at bedside, orders to pull femoral arterial line. Cuff pressures correlating with doppler MAPs, which correlated with arterial pressures 0: Pt's wife and son at bedside, LVAD education provided, questions answered. 1620: Informed Rojas Sena NP of pt's low MAPs (low 60s), informed of CVP of 2-3, Dobutamine at 1mcg/kg/min, new orders received to give 1 bottle of albumin 1823: HF team paged, spoke with Jaqueline Cardona NP over phone. Informed of pt's increasing SOB while eating dinner, decreasing SvO2 into 40s, increasing PAP, CVP 2-6. Orders to increase dobutamine gtt to 5mcg/kg/min and leave overnight.   Also updated her on status of pt's left upper extremity. Pt still complains of soreness and inability to move extremity without pain. Extremity has warmed up since earlier in the day, but remains painful for patient. Orders received for lidocaine patch to be placed on site of pain on that extremity. 1945: Bedside and Verbal shift change report given to Landon Colvin RN (oncoming nurse) by Ene Chang RN (offgoing nurse). Report included the following information SBAR, ED Summary, OR Summary, Procedure Summary, Intake/Output, MAR, Recent Results, Cardiac Rhythm Paced and Alarm Parameters .

## 2019-11-19 NOTE — PROGRESS NOTES
Physical Therapy 11/19/2019 Chart reviewed. Patient is s/p LVAD implantation, 11/18/19. At this time, patient remains intubated - noted tentative plans to extubate today. Will continue to follow/pending patient's medical stability and ability to participate in skilled therapy interventions. Thank you.  
Dustin Vega, PT, DPT

## 2019-11-19 NOTE — PROGRESS NOTES
[facility] 
 [address] [facility], [facility] [address] [facility] Phone: [phone]   [phone]   
  
Nephrology Progress Note Sona Kiser     1950     [id number] Date of Admission : 10/25/2019 
11/19/19 CC:  Follow up for JUAN J, CKD, Hyponatremia Assessment and Plan JUAN J on CKD 
- 2/2 CRS and urinary retention ==> essentially resolved and stable so far post VAD 
- CXR : Pulm edema from CHF/ blood products  
- CVP ~ 7. PA pressures at goal.  
- Diuretics with any/ all blood products  
  
Acute on Chronic HFrEF  
- EF 16-20%, NYHA Class IV , hx of VF arrest - s/p AICD 
- Impella insertion 10/29- removed 11/18  
- s/p LVAD placement on 11/18 
- On Milrinone and Impella  
- diuretic dosing per AHF team  
 
CKD Stage III  
- Mild Left renal atrophy at baseline Gross hematuria, BPH w/ retention: 
- off CBI pre VAD. cysto showed catheter related Cystitis @ postr wall  
- keep neal for today Hoarseness, vocal cord paralysis, mediastinal adenopathy: 
- will need eventual PET/CT 
  
Acute Resp failure JOSE on CPAP  
- Post op resp failure : On Vent - CXR w/ Pulm edema  
  
PAF s/p DCCV 6/19 
  
Anemia : Now w/ blood loss  
- has ongoing blood loss - s/p IV iron,  recheck Iron  
- ordered weekly PAVEL to reduce transfusion needs  
- transfuse to keep Hb > 7 Serratia and Enteroccocus UTI: 
- completed abx Interval History: VAD placement complicated by severe bleeding. 1.1L blood loss Received 1 unit RBC, 4 FFP, 2 PLT, K centra and 27 mcg DDAVP 
UOP 40-80 cc / hr  
Remains on vent CXR wet Has high chest out put and Hb trending down Review of Systems: Pertinent items are noted in HPI. Current Medications:  
Current Facility-Administered Medications Medication Dose Route Frequency  potassium chloride 20 mEq in 50 ml IVPB  20 mEq IntraVENous ONCE  
 magnesium sulfate 1 g/100 ml IVPB (premix or compounded)  1 g IntraVENous ONCE  
  0.9% sodium chloride infusion  9 mL/hr IntraVENous CONTINUOUS  
 0.45% sodium chloride infusion  10 mL/hr IntraVENous CONTINUOUS  
 sodium chloride (NS) flush 5-40 mL  5-40 mL IntraVENous Q8H  
 sodium chloride (NS) flush 5-40 mL  5-40 mL IntraVENous PRN  
 acetaminophen (TYLENOL) tablet 650 mg  650 mg Oral Q6H PRN  
 oxyCODONE IR (ROXICODONE) tablet 5 mg  5 mg Oral Q4H PRN  
 oxyCODONE IR (ROXICODONE) tablet 10 mg  10 mg Oral Q4H PRN  
 naloxone (NARCAN) injection 0.4 mg  0.4 mg IntraVENous PRN  
 mupirocin (BACTROBAN) 2 % ointment   Both Nostrils BID  levoFLOXacin (LEVAQUIN) 500 mg in D5W IVPB  500 mg IntraVENous Q24H  
 rifAMPin (RIFADIN) 600 mg in 0.9% sodium chloride 100 mL IVPB  600 mg IntraVENous DAILY  fluconazole (DIFLUCAN) 200mg/100 mL IVPB (premix)  200 mg IntraVENous Q24H  
 ondansetron (ZOFRAN) injection 4 mg  4 mg IntraVENous Q4H PRN  
 albuterol-ipratropium (DUO-NEB) 2.5 MG-0.5 MG/3 ML  3 mL Nebulization Q6H PRN  chlorhexidine (PERIDEX) 0.12 % mouthwash 10 mL  10 mL Oral BID  senna-docusate (PERICOLACE) 8.6-50 mg per tablet 1 Tab  1 Tab Oral DAILY  polyethylene glycol (MIRALAX) packet 17 g  17 g Oral DAILY  bisacodyl (DULCOLAX) tablet 5 mg  5 mg Oral DAILY PRN  
 acetaminophen (OFIRMEV) infusion 1,000 mg  1,000 mg IntraVENous Q6H PRN  pantoprazole (PROTONIX) 40 mg in sodium chloride 0.9% 10 mL injection  40 mg IntraVENous Q12H  
 sucralfate (CARAFATE) tablet 1 g  1 g Oral AC&HS  
 0.9% sodium chloride infusion 250 mL  250 mL IntraVENous PRN  Vancomycin - Pharmacy dosing   Other Rx Dosing/Monitoring  influenza vaccine 2019-20 (6 mos+)(PF) (FLUARIX/FLULAVAL/FLUZONE QUAD) injection 0.5 mL  0.5 mL IntraMUSCular PRIOR TO DISCHARGE  vancomycin (VANCOCIN) 1250 mg in  ml infusion  1,250 mg IntraVENous Q16H  
 hydrALAZINE (APRESOLINE) 20 mg/mL injection 20 mg  20 mg IntraVENous Q6H PRN  
 morphine injection 4 mg  4 mg IntraVENous Q2H PRN  
  dextrose 10 % infusion 125-250 mL  125-250 mL IntraVENous PRN  
 milrinone (PRIMACOR) 20 MG/100 ML D5W infusion  0-0.75 mcg/kg/min IntraVENous TITRATE  DOBUTamine (DOBUTREX) 1,000 mg/250 mL (4,000 mcg/mL) infusion  0-10 mcg/kg/min IntraVENous TITRATE  nitroglycerin (Tridil) 200 mcg/ml infusion  0-200 mcg/min IntraVENous TITRATE  amiodarone (CORDARONE) 900 mg/250 ml D5W infusion  0.5 mg/min IntraVENous CONTINUOUS  
 dexmedeTOMidine (PRECEDEX) 400 mcg in 0.9% sodium chloride 100 mL infusion  0.1-0.9 mcg/kg/hr IntraVENous TITRATE  EPINEPHrine (ADRENALIN) 10 mg in 0.9% sodium chloride 250 mL infusion  0-10 mcg/min IntraVENous TITRATE  insulin regular (NOVOLIN R, HUMULIN R) 100 Units in 0.9% sodium chloride 100 mL infusion  1-50 Units/hr IntraVENous TITRATE  niCARdipine (CARDENE) 25 mg in 0.9% sodium chloride 250 mL infusion  0-15 mg/hr IntraVENous TITRATE  
 NOREPINephrine (LEVOPHED) 32 mg in dextrose 5% 250 mL (128 mcg/mL) infusion  0.5-16 mcg/min IntraVENous TITRATE  PHENYLephrine (JUAN PABLO-SYNEPHRINE) 100 mg in 0.9% sodium chloride 250 mL infusion   mcg/min IntraVENous TITRATE  vasopressin (VASOSTRICT) 20 Units in 0.9% sodium chloride 100 mL infusion  0-0.04 Units/min IntraVENous TITRATE  ELECTROLYTE REPLACEMENT NOTE: Nurse to review Serum Potassium and Magnesuim levels and Initiate Electrolyte Replacement Protocol as needed  1 Each Other PRN No Known Allergies Objective: 
Vitals:   
Vitals:  
 11/19/19 0400 11/19/19 0430 11/19/19 0500 11/19/19 0600 BP:      
Pulse: 77 76 77 80 Resp: 20 18 23 19 Temp: 99.2 °F (37.3 °C)  98.9 °F (37.2 °C) 98.7 °F (37.1 °C) SpO2: 96% 96% 96% 99% Weight:    90.8 kg (200 lb 3.2 oz) Height:    6' 2\" (1.88 m) Intake and Output: 
11/18 1901 - 11/19 0700 In: 1882.8 [I.V.:1882.8] Out: 943 [Urine:553; JVXNLO:097] 11/17 0701 - 11/18 1900 In: 6901.9 [I.V.:3194.8] Out: 49684 [OCDMF:7607; VMCEGY:699] Physical Examination: General: On vent Neck:  Lines + Resp:  Rales B/L  
CV:  VADsounds  trace+ b/l LE edema GI:  Soft, NT, + Bowel sounds Neurologic:  SEDATED on vent :  Lizama w/ dark/ green urine [x]    High complexity decision making was performed 
[]    Patient is at high-risk of decompensation with multiple organ involvement Lab Data Personally Reviewed: I have reviewed all the pertinent labs, microbiology data and radiology studies during assessment. Recent Labs 11/19/19 
0001 11/18/19 
2320 11/18/19 1851 11/18/19 
1432 11/18/19 0414 11/17/19 0411  141  --  141 135* 136  
K 3.9 4.3  --  3.0* 3.5 3.6  105  --  104 96* 100 CO2 28 29  --  29 34* 30  
* 106*  --  178* 109* 77 BUN 19 18  --  14 14 12 CREA 1.30 1.26  --  1.26 1.35* 1.20 CA 8.4* 8.3*  --  8.3* 8.6 8.4* MG 2.0 2.0  --  1.8 1.9 1.9 ALB 2.7* 2.6*  --  2.2* 2.8* 2.6* SGOT 52* 50*  --  50* 52* 20 ALT 10* 12  --  15 9* 7* INR 1.2*  --  1.3* 1.3* 1.2* 1.1 Recent Labs 11/19/19 
4421 11/18/19 1851 11/18/19 
1434 11/18/19 0414 11/17/19 2232 11/17/19 0411 WBC 3.9*  --  4.0* 5.0  --  3.8* HGB 7.3* 8.0* 8.8* 9.4* 9.4* 8.4* HCT 23.1* 25.2* 27.9* 30.6*  --  27.6* PLT 74*  --  88* 90* 79* 109* No results found for: SDES Lab Results Component Value Date/Time Culture result: NO GROWTH 5 DAYS 11/12/2019 11:18 AM  
 Culture result: SERRATIA MARCESCENS (A) 10/31/2019 10:05 AM  
 Culture result: SERRATIA MARCESCENS (2ND COLONY TYPE/STRAIN) (A) 10/31/2019 10:05 AM  
 Culture result: ENTEROCOCCUS FAECALIS GROUP D (A) 10/31/2019 10:05 AM  
 Culture result: NO GROWTH 5 DAYS 10/25/2019 07:14 PM  
 
Recent Results (from the past 24 hour(s)) PLATELETS, ALLOCATE Collection Time: 11/18/19  8:30 AM  
Result Value Ref Range Unit number [id number] Blood component type PLTPH LR,2 Unit division 00 Status of unit TRANSFUSED   
GLUCOSTABILIZER  Collection Time: 11/18/19 10:16 AM  
 Result Value Ref Range Glucose 136 mg/dL Insulin order 2.3 units/hour Insulin adminstered 2.3 units/hour Multiplier 0.030 Low target 100 mg/dL High target 140 mg/dL D50 order 0.0 ml  
 D50 administered 0.00 ml Minutes until next BG 60 min Order initials [facility] Administered initials [address] [place] [address] Collection Time: 11/18/19 11:10 AM  
Result Value Ref Range Glucose 158 mg/dL Insulin order 3.9 units/hour Insulin adminstered 3.9 units/hour Multiplier 0.040 Low target 100 mg/dL High target 140 mg/dL D50 order 0.0 ml  
 D50 administered 0.00 ml Minutes until next BG 60 min Order initials [facility] Administered initials [address] [place] [address] Collection Time: 11/18/19 11:31 AM  
Result Value Ref Range Glucose 201 mg/dL Insulin order 7.1 units/hour Insulin adminstered 7.1 units/hour Multiplier 0.050 Low target 100 mg/dL High target 140 mg/dL D50 order 0.0 ml  
 D50 administered 0.00 ml Minutes until next BG 60 min Order initials [facility] Administered initials [address] [place] [address] Collection Time: 11/18/19  1:01 PM  
Result Value Ref Range Glucose 195 mg/dL Insulin order 8.1 units/hour Insulin adminstered 8.1 units/hour Multiplier 0.060 Low target 100 mg/dL High target 140 mg/dL D50 order 0.0 ml  
 D50 administered 0.00 ml Minutes until next BG 60 min Order initials [facility] Administered initials [address] [place] [address] Collection Time: 11/18/19  1:47 PM  
Result Value Ref Range Glucose 182 mg/dL Insulin order 8.5 units/hour Insulin adminstered 8.5 units/hour Multiplier 0.070 Low target 100 mg/dL High target 140 mg/dL D50 order 0.0 ml  
 D50 administered 0.00 ml Minutes until next BG 60 min Order initials [facility] Administered initials [address] [place] [address] METABOLIC PANEL, COMPREHENSIVE Collection Time: 11/18/19  2:32 PM  
Result Value Ref Range Sodium 141 136 - 145 mmol/L Potassium 3.0 (L) 3.5 - 5.1 mmol/L Chloride 104 97 - 108 mmol/L  
 CO2 29 21 - 32 mmol/L Anion gap 8 5 - 15 mmol/L Glucose 178 (H) 65 - 100 mg/dL BUN 14 6 - 20 MG/DL Creatinine 1.26 0.70 - 1.30 MG/DL  
 BUN/Creatinine ratio 11 (L) 12 - 20 GFR est AA >60 >60 ml/min/1.73m2 GFR est non-AA 57 (L) >60 ml/min/1.73m2 Calcium 8.3 (L) 8.5 - 10.1 MG/DL Bilirubin, total 3.0 (H) 0.2 - 1.0 MG/DL  
 ALT (SGPT) 15 12 - 78 U/L  
 AST (SGOT) 50 (H) 15 - 37 U/L Alk. phosphatase 76 45 - 117 U/L Protein, total 4.5 (L) 6.4 - 8.2 g/dL Albumin 2.2 (L) 3.5 - 5.0 g/dL Globulin 2.3 2.0 - 4.0 g/dL A-G Ratio 1.0 (L) 1.1 - 2.2 MAGNESIUM Collection Time: 11/18/19  2:32 PM  
Result Value Ref Range Magnesium 1.8 1.6 - 2.4 mg/dL LD Collection Time: 11/18/19  2:32 PM  
Result Value Ref Range  (H) 85 - 241 U/L  
LACTIC ACID Collection Time: 11/18/19  2:32 PM  
Result Value Ref Range Lactic acid 3.8 (HH) 0.4 - 2.0 MMOL/L  
PROTHROMBIN TIME + INR Collection Time: 11/18/19  2:32 PM  
Result Value Ref Range INR 1.3 (H) 0.9 - 1.1 Prothrombin time 13.2 (H) 9.0 - 11.1 sec PTT Collection Time: 11/18/19  2:32 PM  
Result Value Ref Range aPTT 29.7 22.1 - 32.0 sec  
 aPTT, therapeutic range     58.0 - 77.0 SECS  
CBC WITH AUTOMATED DIFF Collection Time: 11/18/19  2:34 PM  
Result Value Ref Range WBC 4.0 (L) 4.1 - 11.1 K/uL  
 RBC 2.91 (L) 4.10 - 5.70 M/uL HGB 8.8 (L) 12.1 - 17.0 g/dL HCT 27.9 (L) 36.6 - 50.3 % MCV 95.9 80.0 - 99.0 FL  
 MCH 30.2 26.0 - 34.0 PG  
 MCHC 31.5 30.0 - 36.5 g/dL  
 RDW 18.6 (H) 11.5 - 14.5 % PLATELET 88 (L) 745 - 400 K/uL MPV 10.6 8.9 - 12.9 FL  
 NRBC 0.5 (H) 0  WBC ABSOLUTE NRBC 0.02 (H) 0.00 - 0.01 K/uL NEUTROPHILS 86 (H) 32 - 75 % LYMPHOCYTES 7 (L) 12 - 49 % MONOCYTES 7 5 - 13 % EOSINOPHILS 0 0 - 7 % BASOPHILS 0 0 - 1 % IMMATURE GRANULOCYTES 0 %  
 ABS. NEUTROPHILS 3.4 1.8 - 8.0 K/UL  
 ABS. LYMPHOCYTES 0.3 (L) 0.8 - 3.5 K/UL  
 ABS. MONOCYTES 0.3 0.0 - 1.0 K/UL  
 ABS. EOSINOPHILS 0.0 0.0 - 0.4 K/UL  
 ABS. BASOPHILS 0.0 0.0 - 0.1 K/UL  
 ABS. IMM. GRANS. 0.0 K/UL  
 DF MANUAL    
 RBC COMMENTS ANISOCYTOSIS 1+ GLUCOSE, POC Collection Time: 11/18/19  2:42 PM  
Result Value Ref Range Glucose (POC) 189 (H) 65 - 100 mg/dL Performed by Aleida Rizvi Collection Time: 11/18/19  2:43 PM  
Result Value Ref Range Glucose 189 mg/dL Insulin order 10.3 units/hour Insulin adminstered 10.3 units/hour Multiplier 0.080 Low target 95 mg/dL High target 130 mg/dL D50 order 0.0 ml  
 D50 administered 0.00 ml Minutes until next BG 60 min Order initials elk Administered initials elk GLSCOM Comments GLUCOSE, POC Collection Time: 11/18/19  3:46 PM  
Result Value Ref Range Glucose (POC) 128 (H) 65 - 100 mg/dL Performed by Raúl Rizvi Collection Time: 11/18/19  3:46 PM  
Result Value Ref Range Glucose 128 mg/dL Insulin order 5.4 units/hour Insulin adminstered 5.4 units/hour Multiplier 0.080 Low target 95 mg/dL High target 130 mg/dL D50 order 0.0 ml  
 D50 administered 0.00 ml Minutes until next BG 60 min Order initials elk Administered initials ekl GLSCOM Comments POC G3 - PUL Collection Time: 11/18/19  3:54 PM  
Result Value Ref Range pH (POC) 7.454 (H) 7.35 - 7.45    
 pCO2 (POC) 41.6 35.0 - 45.0 MMHG  
 pO2 (POC) 345 (H) 80 - 100 MMHG  
 HCO3 (POC) 29.2 (H) 22 - 26 MMOL/L  
 sO2 (POC) 100 (H) 92 - 97 % Base excess (POC) 5 mmol/L Site DRAWN FROM ARTERIAL LINE Device: VENT Mode ASSIST CONTROL Tidal volume 550 ml Set Rate 18 bpm  
 PEEP/CPAP (POC) 5 cmH2O  
 PIP (POC) 30  Allens test (POC) YES    
 Nitric-ppm (POC) 20 ppm  
 Specimen type (POC) ARTERIAL    
POC VENOUS BLOOD GAS Collection Time: 11/18/19  3:58 PM  
Result Value Ref Range Device: VENT    
 pH, venous (POC) 7.475 (H) 7.32 - 7.42    
 pCO2, venous (POC) 35.0 (L) 41 - 51 MMHG  
 pO2, venous (POC) 34 25 - 40 mmHg HCO3, venous (POC) 25.8 23.0 - 28.0 MMOL/L  
 sO2, venous (POC) 71 65 - 88 % Base excess, venous (POC) 2 mmol/L Mode ASSIST CONTROL Tidal volume 550 ml Set Rate 18 bpm  
 PEEP/CPAP (POC) 5 cmH2O  
 PIP (POC) 29 Allens test (POC) YES Nitric-ppm (POC) 20 ppm  
 Site SWAN [address] Specimen type (POC) MIXED VENOUS    
GLUCOSE, POC Collection Time: 11/18/19  4:48 PM  
Result Value Ref Range Glucose (POC) 80 65 - 100 mg/dL Performed by Farooq Holli Jackson Collection Time: 11/18/19  4:48 PM  
Result Value Ref Range Glucose 80 mg/dL Insulin order 0.0 units/hour Insulin adminstered 0.0 units/hour Multiplier 0.064 Low target 95 mg/dL High target 130 mg/dL D50 order 8.0 ml  
 D50 administered 8.00 ml Minutes until next BG 15 min Order initials el[place] Administered initials [place] GLSCOM Comments GLUCOSE, POC Collection Time: 11/18/19  5:07 PM  
Result Value Ref Range Glucose (POC) 88 65 - 100 mg/dL Performed by Eloise Nava Collection Time: 11/18/19  5:07 PM  
Result Value Ref Range Glucose DELETED mg/dL Insulin order DELETED units/hour Insulin adminstered DELETED units/hour Multiplier DELETED Low target DELETED mg/dL High target DELETED mg/dL D50 order DELETED ml  
 D50 administered DELETED ml  
 Minutes until next BG DELETED min Order initials DELETED Administered initials DELETED   
 GLSCOM Comments DELETED   
GLUCOSTABILIZER Collection Time: 11/18/19  5:09 PM  
Result Value Ref Range Glucose 88 mg/dL Insulin order 1.4 units/hour Insulin adminstered 0.0 units/hour  Multiplier 0.051   
 Low target 95 mg/dL High target 130 mg/dL D50 order 0.0 ml  
 D50 administered 0.00 ml Minutes until next BG 60 min Order initials elk Administered initials [place] GLSCOM Comments BG 88 after D10 administration GLUCOSE, POC Collection Time: 11/18/19  6:11 PM  
Result Value Ref Range Glucose (POC) 108 (H) 65 - 100 mg/dL Performed by Elena Ibarra Collection Time: 11/18/19  6:11 PM  
Result Value Ref Range Glucose 108 mg/dL Insulin order 2.4 units/hour Insulin adminstered 2.4 units/hour Multiplier 0.051 Low target 95 mg/dL High target 130 mg/dL D50 order 0.0 ml  
 D50 administered 0.00 ml Minutes until next BG 60 min Order initials elk Administered initials ekl GLSCOM Comments PROTHROMBIN TIME + INR Collection Time: 11/18/19  6:51 PM  
Result Value Ref Range INR 1.3 (H) 0.9 - 1.1 Prothrombin time 12.6 (H) 9.0 - 11.1 sec PTT Collection Time: 11/18/19  6:51 PM  
Result Value Ref Range aPTT 27.5 22.1 - 32.0 sec  
 aPTT, therapeutic range     58.0 - 77.0 SECS  
HGB & HCT Collection Time: 11/18/19  6:51 PM  
Result Value Ref Range HGB 8.0 (L) 12.1 - 17.0 g/dL HCT 25.2 (L) 36.6 - 50.3 % GLUCOSE, POC Collection Time: 11/18/19  7:21 PM  
Result Value Ref Range Glucose (POC) 107 (H) 65 - 100 mg/dL Performed by Elena Ibarra Collection Time: 11/18/19  7:22 PM  
Result Value Ref Range Glucose 107 mg/dL Insulin order 2.4 units/hour Insulin adminstered 2.4 units/hour Multiplier 0.051 Low target 95 mg/dL High target 130 mg/dL D50 order 0.0 ml  
 D50 administered 0.00 ml Minutes until next BG 60 min Order initials el[place] Administered initials ekl GLSCOM Comments POC G3 - PUL Collection Time: 11/18/19  7:37 PM  
Result Value Ref Range FIO2 (POC) 50 % pH (POC) 7.509 (H) 7.35 - 7.45    
 pCO2 (POC) 35.0 35.0 - 45.0 MMHG pO2 (POC) 58 (L) 80 - 100 MMHG  
 HCO3 (POC) 27.9 (H) 22 - 26 MMOL/L  
 sO2 (POC) 92 92 - 97 % Base excess (POC) 5 mmol/L Site DRAWN FROM ARTERIAL LINE Device: VENT Mode ASSIST CONTROL Tidal volume 550 ml Set Rate 18 bpm  
 PEEP/CPAP (POC) 5 cmH2O Allens test (POC) N/A Nitric-ppm (POC) 20 ppm  
 Specimen type (POC) ARTERIAL    
GLUCOSE, POC Collection Time: 11/18/19  8:23 PM  
Result Value Ref Range Glucose (POC) 115 (H) 65 - 100 mg/dL Performed by Brain Cali Collection Time: 11/18/19  8:24 PM  
Result Value Ref Range Glucose 115 mg/dL Insulin order 2.8 units/hour Insulin adminstered 2.8 units/hour Multiplier 0.051 Low target 95 mg/dL High target 130 mg/dL D50 order 0.0 ml  
 D50 administered 0.00 ml Minutes until next BG 60 min Order initials [address] Administered initials [address]   
 GLSCOM Comments GLUCOSE, POC Collection Time: 11/18/19  9:19 PM  
Result Value Ref Range Glucose (POC) 97 65 - 100 mg/dL Performed by Brain Cali Collection Time: 11/18/19  9:20 PM  
Result Value Ref Range Glucose 97 mg/dL Insulin order 1.9 units/hour Insulin adminstered 1.9 units/hour Multiplier 0.051 Low target 95 mg/dL High target 130 mg/dL D50 order 0.0 ml  
 D50 administered 0.00 ml Minutes until next BG 60 min Order initials [address] Administered initials [address]   
 GLSCOM Comments GLUCOSE, POC Collection Time: 11/18/19 10:07 PM  
Result Value Ref Range Glucose (POC) 87 65 - 100 mg/dL Performed by Brain Cali Collection Time: 11/18/19 10:07 PM  
Result Value Ref Range Glucose 87 mg/dL Insulin order 1.1 units/hour Insulin adminstered 1.1 units/hour Multiplier 0.041 Low target 95 mg/dL High target 130 mg/dL D50 order 0.0 ml  
 D50 administered 0.00 ml Minutes until next BG 60 min  Order initials [address]   
 Administered initials [address]   
 GLSCOM Comments GLUCOSE, POC Collection Time: 11/18/19 11:04 PM  
Result Value Ref Range Glucose (POC) 88 65 - 100 mg/dL Performed by Shae Minaya Collection Time: 11/18/19 11:04 PM  
Result Value Ref Range Glucose 88 mg/dL Insulin order 0.9 units/hour Insulin adminstered 0.9 units/hour Multiplier 0.031 Low target 95 mg/dL High target 130 mg/dL D50 order 0.0 ml  
 D50 administered 0.00 ml Minutes until next BG 60 min Order initials [address] Administered initials [address]   
 GLSCOM Comments METABOLIC PANEL, COMPREHENSIVE Collection Time: 11/18/19 11:20 PM  
Result Value Ref Range Sodium 141 136 - 145 mmol/L Potassium 4.3 3.5 - 5.1 mmol/L Chloride 105 97 - 108 mmol/L  
 CO2 29 21 - 32 mmol/L Anion gap 7 5 - 15 mmol/L Glucose 106 (H) 65 - 100 mg/dL BUN 18 6 - 20 MG/DL Creatinine 1.26 0.70 - 1.30 MG/DL  
 BUN/Creatinine ratio 14 12 - 20 GFR est AA >60 >60 ml/min/1.73m2 GFR est non-AA 57 (L) >60 ml/min/1.73m2 Calcium 8.3 (L) 8.5 - 10.1 MG/DL Bilirubin, total 3.7 (H) 0.2 - 1.0 MG/DL  
 ALT (SGPT) 12 12 - 78 U/L  
 AST (SGOT) 50 (H) 15 - 37 U/L Alk. phosphatase 67 45 - 117 U/L Protein, total 4.8 (L) 6.4 - 8.2 g/dL Albumin 2.6 (L) 3.5 - 5.0 g/dL Globulin 2.2 2.0 - 4.0 g/dL A-G Ratio 1.2 1.1 - 2.2 MAGNESIUM Collection Time: 11/18/19 11:20 PM  
Result Value Ref Range Magnesium 2.0 1.6 - 2.4 mg/dL GLUCOSE, POC Collection Time: 11/19/19 12:06 AM  
Result Value Ref Range Glucose (POC) 91 65 - 100 mg/dL Performed by Shae Minaya Collection Time: 11/19/19 12:06 AM  
Result Value Ref Range Glucose 91 mg/dL Insulin order 0.7 units/hour Insulin adminstered 0.7 units/hour Multiplier 0.021 Low target 95 mg/dL High target 130 mg/dL D50 order 0.0 ml  
 D50 administered 0.00 ml Minutes until next BG 60 min Order initials [address] Administered initials [address]   
 GLSCOM Comments POC G3 - PUL Collection Time: 11/19/19 12:48 AM  
Result Value Ref Range FIO2 (POC) 80 % pH (POC) 7.523 (H) 7.35 - 7.45    
 pCO2 (POC) 32.7 (L) 35.0 - 45.0 MMHG  
 pO2 (POC) 215 (H) 80 - 100 MMHG  
 HCO3 (POC) 26.9 (H) 22 - 26 MMOL/L  
 sO2 (POC) 100 (H) 92 - 97 % Base excess (POC) 4 mmol/L Site DRAWN FROM ARTERIAL LINE Device: VENT Mode ASSIST CONTROL Tidal volume 550 ml Set Rate 18 bpm  
 PEEP/CPAP (POC) 5 cmH2O Allens test (POC) N/A Nitric-ppm (POC) 20 ppm  
 Specimen type (POC) ARTERIAL    
GLUCOSE, POC Collection Time: 11/19/19  1:08 AM  
Result Value Ref Range Glucose (POC) 90 65 - 100 mg/dL Performed by Himanshu Ann Collection Time: 11/19/19  1:08 AM  
Result Value Ref Range Glucose 90 mg/dL Insulin order 0.3 units/hour Insulin adminstered 0.3 units/hour Multiplier 0.011 Low target 95 mg/dL High target 130 mg/dL D50 order 0.0 ml  
 D50 administered 0.00 ml Minutes until next BG 60 min Order initials [address] Administered initials [address]   
 GLSCOM Comments GLUCOSE, POC Collection Time: 11/19/19  2:07 AM  
Result Value Ref Range Glucose (POC) 115 (H) 65 - 100 mg/dL Performed by Jass Jack Collection Time: 11/19/19  2:08 AM  
Result Value Ref Range Glucose 115 mg/dL Insulin order 0.6 units/hour Insulin adminstered 0.6 units/hour Multiplier 0.011 Low target 95 mg/dL High target 130 mg/dL D50 order 0.0 ml  
 D50 administered 0.00 ml Minutes until next BG 60 min Order initials [address] Administered initials [address]   
 GLSCOM Comments GLUCOSE, POC Collection Time: 11/19/19  3:06 AM  
Result Value Ref Range Glucose (POC) 110 (H) 65 - 100 mg/dL Performed by Himanshu Ann Collection Time: 11/19/19  3:06 AM  
Result Value Ref Range Glucose 110 mg/dL Insulin order 0.6 units/hour Insulin adminstered 0.6 units/hour Multiplier 0.011 Low target 95 mg/dL High target 130 mg/dL D50 order 0.0 ml  
 D50 administered 0.00 ml Minutes until next BG 60 min Order initials [address] Administered initials [address]   
 GLSCOM Comments CBC WITH AUTOMATED DIFF Collection Time: 11/19/19  3:49 AM  
Result Value Ref Range WBC 3.9 (L) 4.1 - 11.1 K/uL  
 RBC 2.43 (L) 4.10 - 5.70 M/uL HGB 7.3 (L) 12.1 - 17.0 g/dL HCT 23.1 (L) 36.6 - 50.3 % MCV 95.1 80.0 - 99.0 FL  
 MCH 30.0 26.0 - 34.0 PG  
 MCHC 31.6 30.0 - 36.5 g/dL  
 RDW 19.9 (H) 11.5 - 14.5 % PLATELET 74 (L) 546 - 400 K/uL MPV 10.8 8.9 - 12.9 FL  
 NRBC 0.0 0  WBC ABSOLUTE NRBC 0.00 0.00 - 0.01 K/uL NEUTROPHILS 70 32 - 75 % LYMPHOCYTES 18 12 - 49 % MONOCYTES 9 5 - 13 % EOSINOPHILS 0 0 - 7 % BASOPHILS 1 0 - 1 % IMMATURE GRANULOCYTES 1 (H) 0.0 - 0.5 % ABS. NEUTROPHILS 2.7 1.8 - 8.0 K/UL  
 ABS. LYMPHOCYTES 0.7 (L) 0.8 - 3.5 K/UL  
 ABS. MONOCYTES 0.4 0.0 - 1.0 K/UL  
 ABS. EOSINOPHILS 0.0 0.0 - 0.4 K/UL  
 ABS. BASOPHILS 0.0 0.0 - 0.1 K/UL  
 ABS. IMM. GRANS. 0.0 0.00 - 0.04 K/UL  
 DF AUTOMATED METABOLIC PANEL, COMPREHENSIVE Collection Time: 11/19/19  3:49 AM  
Result Value Ref Range Sodium 141 136 - 145 mmol/L Potassium 3.9 3.5 - 5.1 mmol/L Chloride 106 97 - 108 mmol/L  
 CO2 28 21 - 32 mmol/L Anion gap 7 5 - 15 mmol/L Glucose 102 (H) 65 - 100 mg/dL BUN 19 6 - 20 MG/DL Creatinine 1.30 0.70 - 1.30 MG/DL  
 BUN/Creatinine ratio 15 12 - 20 GFR est AA >60 >60 ml/min/1.73m2 GFR est non-AA 55 (L) >60 ml/min/1.73m2 Calcium 8.4 (L) 8.5 - 10.1 MG/DL Bilirubin, total 3.4 (H) 0.2 - 1.0 MG/DL  
 ALT (SGPT) 10 (L) 12 - 78 U/L  
 AST (SGOT) 52 (H) 15 - 37 U/L Alk. phosphatase 66 45 - 117 U/L Protein, total 4.9 (L) 6.4 - 8.2 g/dL Albumin 2.7 (L) 3.5 - 5.0 g/dL Globulin 2.2 2.0 - 4.0 g/dL A-G Ratio 1.2 1.1 - 2.2 MAGNESIUM Collection Time: 11/19/19  3:49 AM  
Result Value Ref Range Magnesium 2.0 1.6 - 2.4 mg/dL LD Collection Time: 11/19/19  3:49 AM  
Result Value Ref Range  (H) 85 - 241 U/L  
LACTIC ACID Collection Time: 11/19/19  3:49 AM  
Result Value Ref Range Lactic acid 1.2 0.4 - 2.0 MMOL/L  
PROTHROMBIN TIME + INR Collection Time: 11/19/19  3:49 AM  
Result Value Ref Range INR 1.2 (H) 0.9 - 1.1 Prothrombin time 12.4 (H) 9.0 - 11.1 sec PTT Collection Time: 11/19/19  3:49 AM  
Result Value Ref Range aPTT 28.2 22.1 - 32.0 sec  
 aPTT, therapeutic range     58.0 - 77.0 SECS  
GLUCOSE, POC Collection Time: 11/19/19  4:01 AM  
Result Value Ref Range Glucose (POC) 105 (H) 65 - 100 mg/dL Performed by Moses Tan Collection Time: 11/19/19  4:07 AM  
Result Value Ref Range Glucose 105 mg/dL Insulin order 0.5 units/hour Insulin adminstered 0.5 units/hour Multiplier 0.011 Low target 95 mg/dL High target 130 mg/dL D50 order 0.0 ml  
 D50 administered 0.00 ml Minutes until next BG 60 min Order initials [address] Administered initials [address]   
 GLSCOM Comments EKG, 12 LEAD, INITIAL Collection Time: 11/19/19  4:17 AM  
Result Value Ref Range Ventricular Rate 76 BPM  
 Atrial Rate 0 BPM  
 QRS Duration 156 ms  
 Q-T Interval 564 ms QTC Calculation (Bezet) 634 ms Calculated R Axis 81 degrees Calculated T Axis 120 degrees Diagnosis Electronic ventricular pacemaker When compared with ECG of 13-NOV-2019 04:40, 
Vent. rate has increased BY   2 BPM 
  
POC VENOUS BLOOD GAS Collection Time: 11/19/19  4:33 AM  
Result Value Ref Range Device: VENT    
 FIO2 (POC) 60 %  
 pH, venous (POC) 7.471 (H) 7.32 - 7.42    
 pCO2, venous (POC) 36.2 (L) 41 - 51 MMHG  
 pO2, venous (POC) 29 25 - 40 mmHg HCO3, venous (POC) 26.4 23.0 - 28.0 MMOL/L  
 sO2, venous (POC) 61 (L) 65 - 88 % Base excess, venous (POC) 3 mmol/L Mode ASSIST CONTROL Tidal volume 550 ml Set Rate 18 bpm  
 PEEP/CPAP (POC) 5 cmH2O Allens test (POC) N/A Nitric-ppm (POC) 20 ppm  
 Site SWAN [address] Specimen type (POC) MIXED VENOUS    
POC G3 - PUL Collection Time: 11/19/19  4:37 AM  
Result Value Ref Range FIO2 (POC) 60 % pH (POC) 7.508 (H) 7.35 - 7.45    
 pCO2 (POC) 31.9 (L) 35.0 - 45.0 MMHG  
 pO2 (POC) 100 80 - 100 MMHG  
 HCO3 (POC) 25.4 22 - 26 MMOL/L  
 sO2 (POC) 98 (H) 92 - 97 % Base excess (POC) 2 mmol/L Site DRAWN FROM ARTERIAL LINE Device: VENT Mode ASSIST CONTROL Tidal volume 550 ml Set Rate 18 bpm  
 PEEP/CPAP (POC) 5 cmH2O Allens test (POC) N/A Nitric-ppm (POC) 20 ppm  
 Specimen type (POC) ARTERIAL    
GLUCOSE, POC Collection Time: 11/19/19  5:05 AM  
Result Value Ref Range Glucose (POC) 107 (H) 65 - 100 mg/dL Performed by Christopher Buckley Collection Time: 11/19/19  5:06 AM  
Result Value Ref Range Glucose 107 mg/dL Insulin order 0.5 units/hour Insulin adminstered 0.5 units/hour Multiplier 0.011 Low target 95 mg/dL High target 130 mg/dL D50 order 0.0 ml  
 D50 administered 0.00 ml Minutes until next BG 60 min Order initials ach Administered initials ach GLSCOM Comments GLUCOSE, POC Collection Time: 11/19/19  6:07 AM  
Result Value Ref Range Glucose (POC) 104 (H) 65 - 100 mg/dL Performed by Christopher Buckley Collection Time: 11/19/19  6:08 AM  
Result Value Ref Range Glucose 104 mg/dL Insulin order 0.5 units/hour Insulin adminstered 0.5 units/hour Multiplier 0.011 Low target 95 mg/dL High target 130 mg/dL D50 order 0.0 ml  
 D50 administered 0.00 ml Minutes until next BG 60 min Order initials [address] Administered initials [address]   
 GLSCOM Comments Total time spent with patient:  xxx   min. Care Plan discussed with: 
Patient Family RN Consulting Physician [id number] [facility],      
 
I have reviewed the flowsheets. Chart and Pertinent Notes have been reviewed. No change in PMH ,family and social history from Consult note.  
 
 
Morgan Bonilla MD

## 2019-11-19 NOTE — PROGRESS NOTES
Pt found on these settings. DEMETRIO settings changed by Ailene Severance, MD RRT not notified. 11/19/19 3889 Nitric Oxide Tank PSI 1000 Tank Serial # [id number] Delivery Mode Vent FIO2 Low 21 NO2 High 3 NO High 10 NO Low 1 Nitric Oxide PPM NO Set 2 
(CHANGED BY MD AMALIA) PPM NO Observed 2 PPM NO2 Observed 0  
O2 Index 47  
 
 
0850 DEMETRIO turned off by MD Amalia. RRT notified after.

## 2019-11-19 NOTE — PROGRESS NOTES
07:00 - 07:45 (45)  
08:00 - 08:45 (45)  
11:45 - 13:45 (120) 
14:15 - 14:45 (30)  
15:15 - 15:30 (15) NYHA class IV A/C systolic heart failure Low EF (10%) Inotrope dependent Malnutrition  
LVAD Work-up Epistaxis Hematuria 
 
07:00 - remains intubated and sedated 07:15 - Hgb a little low - will give 1 pRBC 
 
07:30 - inhaled NO down to 10 ppm; CVP 7 
 
08:00 - CVP 8; inhaled NO down to 5 
 
08:15 - CVP 9; inhaled NO down to 3 
 
08:30 - STM rep here; going over PPM results 11:45 - weaned off inhaled NO; extubated 12:00 - chest tube output a little high 12:15 - discussed with HF team; will plan on starting some low dose bivalirudin this afternoon 12:30 - LVAD flows looks good;  
 
12:45 - having some pain in his left arm;  
 
13:00 - likely having pain in left arm from previously place a-line; has doppler signals on that side 13:15 - going over issues with HF team 
 
13:30 - will go ahead and start bivalirudin now and keep an eye on chest tube output 14:15 - coming down on dobutamine 14:30 - cardiac index looks good; at this point will go ahead and remove femoral a-line as we will be starting anti-coagulation soon 15:15 - femoral a-line removed; groin looks good Visit Vitals BP (!) 88/66 (BP 1 Location: Left arm) Pulse 92 Temp 97.9 °F (36.6 °C) Resp 20 Ht 6' 2\" (1.88 m) Wt 200 lb 3.2 oz (90.8 kg) SpO2 94% BMI 25.70 kg/m² LVAD Pump Speed (RPM): 5200 Pump Flow (LPM): 4.3 MAP: 74 
PI (Pulsitility Index): 3.6 Power: 3.6  Test: Yes 
Back Up  at Bedside & Labeled: Yes Power Module Test: Yes Driveline Site Care: No 
Driveline Dressing: Clean, Dry, and Intact Outpatient: No 
MAP in Therapeutic Range (Outpatient): Yes Testing Alarms Reviewed: Yes 
Back up SC speed: 5200 Back up Low Speed Limit: 4800 Emergency Equipment with Patient?: Yes Emergency procedures reviewed?: Yes Drive line site inspected?: No 
 Drive line intergrity inspected?: Yes Drive line dressing changed?: No 
 
 
 
Intake/Output Summary (Last 24 hours) at 11/19/2019 1525 Last data filed at 11/19/2019 1500 Gross per 24 hour Intake 3588.13 ml Output 3068 ml Net 520.13 ml Visit Vitals BP (!) 88/66 (BP 1 Location: Left arm) Pulse 92 Temp 97.9 °F (36.6 °C) Resp 20 Ht 6' 2\" (1.88 m) Wt 200 lb 3.2 oz (90.8 kg) SpO2 94% BMI 25.70 kg/m² Risk of morbidity and mortality - high Medical decision making - high complexity Total critical care time - 255 minutes (CPT 41771, 99292 x 7)

## 2019-11-19 NOTE — PROGRESS NOTES
1935: Bedside shift change report given to Pedro Pablo Mercedes RN (oncoming nurse) by Zhao Clarke, KATRIN (offgoing nurse). Report included the following information SBAR, Kardex, OR Summary, Procedure Summary, Intake/Output, MAR, Accordion, Recent Results, Med Rec Status, Cardiac Rhythm paced with PVCs and Alarm Parameters . Goal MAP 65-85; CI > 2; remain intubated overnight with iNO3. 
1937: ABGs completed at bedside. See results. FiO2 increased to 80% for pO2 of 58. Will continue to monitor.  
2000. Assumed care of patient resting quietly in bed. Pt following commands and remains calm while on ventilator. Drips verified, VSS, and LVAD numbers WDL. 2118: Paged Tinoer. 2123: Spoke with Dr. Kalen Olmeod and updated on patient status and drips. Dr. Kalen Olmedo does not want to wean iNO3 or drips tonight. Goal to keep stable and comfortable. 2334: Albumin given d/t CVP 6 
0000: No changes in assessment. Pt requires frequent suctioning (deep and oral). Thick, bloody secretions are noted. 0049: ABG completed. See results. FiO2 decreased to 60% for pO2 of 215.  
0400: EKG and AM labs completed. Will continue to monitor. No changes from previous assessment. 6655: ABG obtained. See results, no changes made at this time. Will need to re-evaluate in a few hours as pO2 drastically dropped from FiO2 changes. 0547: 20 mg hydralazine given for sustained MAP > 95. Will continue to monitor. 6695: Dr. Cass Elmore at bedside, updated on status, orders received for iron panel and epoetin. 0720: Dr. Kalen Olmedo at bedside and updated on patient status. Decreased iNO3 to 10 ppm. Will order unit of PRBC this morning for low Hgb. Goal to extubate today. 0740: Bedside shift change report given to Anya Holly RN and Cindy Lindquist RN (oncoming nurse) by Pedro Pablo Mercedes RN (offgoing nurse).  Report included the following information SBAR, Kardex, Procedure Summary, Intake/Output, MAR, Accordion, Recent Results, Med Rec Status, Cardiac Rhythm paced w/ PVCs and Alarm Parameters . Problem: Falls - Risk of 
Goal: *Absence of Falls Description Document Joshua Rao Fall Risk and appropriate interventions in the flowsheet. Outcome: Progressing Towards Goal 
Note:  
Fall Risk Interventions: 
Mobility Interventions: Communicate number of staff needed for ambulation/transfer Mentation Interventions: Adequate sleep, hydration, pain control, Door open when patient unattended, Toileting rounds, Update white board Medication Interventions: Evaluate medications/consider consulting pharmacy, Patient to call before getting OOB, Teach patient to arise slowly Elimination Interventions: Toileting schedule/hourly rounds History of Falls Interventions: Consult care management for discharge planning, Door open when patient unattended Problem: Pain Goal: *Control of Pain Outcome: Progressing Towards Goal 
  
Problem: Pressure Injury - Risk of 
Goal: *Prevention of pressure injury Description Document Mich Scale and appropriate interventions in the flowsheet. Outcome: Progressing Towards Goal 
Note:  
Pressure Injury Interventions: 
Sensory Interventions: Assess changes in LOC, Avoid rigorous massage over bony prominences, Float heels, Keep linens dry and wrinkle-free, Maintain/enhance activity level, Minimize linen layers, Monitor skin under medical devices, Pad between skin to skin Moisture Interventions: Absorbent underpads, Apply protective barrier, creams and emollients, Internal/External urinary devices, Minimize layers Activity Interventions: Pressure redistribution bed/mattress(bed type), Assess need for specialty bed Mobility Interventions: Assess need for specialty bed, Float heels, Pressure redistribution bed/mattress (bed type), Turn and reposition approx. every two hours(pillow and wedges) Nutrition Interventions: Document food/fluid/supplement intake Friction and Shear Interventions: Apply protective barrier, creams and emollients, Lift sheet Problem: Infection - Risk of, Multi-drug Resistant Organism Colonization (MDRO) Goal: *Absence of MDRO colonization Outcome: Progressing Towards Goal 
Goal: *Absence of infection signs and symptoms Outcome: Progressing Towards Goal 
  
Problem: Heart Failure: Discharge Outcomes Goal: *Left ventricular function assessment completed prior to or during stay, or planned for post-discharge Outcome: Progressing Towards Goal 
  
Problem: Diabetes Self-Management Goal: *Using medications safely Description State effect of diabetes medications on diabetes; name diabetes medication taking, action and side effects. Outcome: Progressing Towards Goal 
Note: On insulin gtt Problem: Infection - Risk of, Urinary Catheter-Associated Urinary Tract Infection Goal: *Absence of infection signs and symptoms Outcome: Progressing Towards Goal 
  
Problem: Infection - Risk of, Ventilator-Associated Pneumonia Goal: *Absence of infection signs and symptoms Outcome: Progressing Towards Goal 
  
Problem: Ventilator Management Goal: *Adequate oxygenation and ventilation Outcome: Progressing Towards Goal 
Goal: *Patient maintains clear airway/free of aspiration Outcome: Progressing Towards Goal 
Goal: *Absence of infection signs and symptoms Outcome: Progressing Towards Goal

## 2019-11-19 NOTE — DIABETES MGMT
[facility] Cardiac Surgery Progress Note Recommendations/ Comments: Pt arrived to CVICU at 1415 on 11/18/19. Consider continuing insulin gtt for at least 48hrs post-op and eating 50% solid foods then, 
1) transition off gtt per [employer] Protocol 2) continue accu-checks and humalog correctional insulin ac & hs  
3) ADA/AHA diet as diet advanced 4) Pt was not on any medications PTA. May require basal insulin. Will need to determine closer to transition. Insulin gtt should not be stopped until after 1415 on 11/20/19 to complete 48hr post-op time frame. Pt could receive transition as early as 1215  if all criteria met per protocol. Currently on insulin gtt. Current drip rate running at 1.5 units/hr. Last obtained BG was 130 mg/dl at 1522. Received 5.8 units in the past 6 hours. Chart reviewed on Sona Kiser. Patient is 71 y.o. male s/p Cardiac surgery -  LVAD, POD 1. Pt with no prior hx of DM A1c suggestive of new dx. A1c:  
Lab Results Component Value Date/Time Hemoglobin A1c 6.6 (H) 10/25/2019 02:56 PM  
 
 
 
Recent Glucose Results:  
Lab Results Component Value Date/Time  (H) 11/19/2019 03:49 AM  
  (H) 11/18/2019 11:20 PM  
 GLUCPOC 130 (H) 11/19/2019 03:22 PM  
 GLUCPOC 136 (H) 11/19/2019 02:14 PM  
 GLUCPOC 137 (H) 11/19/2019 01:12 PM  
  
 
Lab Results Component Value Date/Time Creatinine 1.30 11/19/2019 03:49 AM  
 
Estimated Creatinine Clearance: 62.4 mL/min (based on SCr of 1.3 mg/dL). Active Orders Diet DIET NPO  
  
 
PO intake:  
No data found. Will continue to follow as needed. Thank you. Raul Oliver RN, CDE Time spent: 3 minutes

## 2019-11-19 NOTE — PROGRESS NOTES
Called Sonam Sandoval to check in with her. She isn't certain if she's coming to the hospital today considering Elmira Maurer is still intubated. She might come in the next few days. She denies any concerns at this time. Asked that she please call  back with their [employer] power account number which she verbalized she will. Christiano Ruff, MSW, LCSW Clinical  [phone]

## 2019-11-19 NOTE — PROGRESS NOTES
Chart reviewed. Patient is s/p POD 1 LVAD implantation, 11/18/19. At this time, patient remains intubated - noted tentative plans to extubate today.  Will continue to follow/pending patient's medical stability and ability to participate in skilled therapy interventions.  
  
Jackie Deras MS, OTR/L

## 2019-11-19 NOTE — PROGRESS NOTES
ID Progress Note 2019 Subjective:  
 
Off vent, looks good Objective: Antibiotics: 1. Levaquin 2. Rifampin 3. Fluconazole 4. Vancomycin Vitals:  
Visit Vitals BP (!) 88/66 (BP 1 Location: Left arm) Pulse 92 Temp 97.9 °F (36.6 °C) Resp 20 Ht 6' 2\" (1.88 m) Wt 90.8 kg (200 lb 3.2 oz) SpO2 94% BMI 25.70 kg/m² Tmax:  Temp (24hrs), Av.3 °F (36.8 °C), Min:97.1 °F (36.2 °C), Max:99.2 °F (37.3 °C) Exam:  Lungs:  clear to auscultation bilaterally Heart:  regular rate and rhythm Abdomen:  soft, non-tender. Bowel sounds normal. No masses,  no organomegaly Grossly bloody urine in catheter Labs:     
Recent Labs 19 
0177 19 
2320 19 
1851 19 
1434 19 
1432 19 
0414 19 
2232 19 
0411 WBC 3.9*  --   --  4.0*  --  5.0  --  3.8* HGB 7.3*  --  8.0* 8.8*  --  9.4* 9.4* 8.4* PLT 74*  --   --  88*  --  90* 79* 109* BUN 19 18  --   --  14 14  --  12  
CREA 1.30 1.26  --   --  1.26 1.35*  --  1.20 SGOT 52* 50*  --   --  50* 52*  --  20  
AP 66 67  --   --  76 141*  --  124* TBILI 3.4* 3.7*  --   --  3.0* 1.4*  --  0.8 Cultures: No results found for: DANIELLE Lab Results Component Value Date/Time Culture result: NO GROWTH 5 DAYS 2019 11:18 AM  
 Culture result: SERRATIA MARCESCENS (A) 10/31/2019 10:05 AM  
 Culture result: SERRATIA MARCESCENS (2ND COLONY TYPE/STRAIN) (A) 10/31/2019 10:05 AM  
 Culture result: ENTEROCOCCUS FAECALIS GROUP D (A) 10/31/2019 10:05 AM  
 
 
Radiology:  
 
Line/Insert Date:        
 
Assessment:  
 
1. UTI--Serratia (2 biotypes) from culture and small amount of Enterococcus 2. CHF/cardiomyopathy Objective: 1. Continue current therapy 2. LVAD placed today Kvng Alford MD

## 2019-11-19 NOTE — PROGRESS NOTES
Cardiac Surgery Care Coordinator-  Met with Cornel Silverio. Reviewed plan of care and discussed goals for the day. Cornel Silverio has a fair understanding of his plan for the day. Reinforced sternal precautions and encouraged continued use of the incentive spirometer. He reports a pain level of 8, bedside nurse aware. Will continue to follow for educational and emotional needs.  Mary Souza RN

## 2019-11-19 NOTE — PROGRESS NOTES
[facility] Progress Note DOS:   11/19/2019 NAME:  Tammie Vidal MRN:   [id number] REFERRING PROVIDER:  Dr. Arnoldo Deleon PRIMARY CARE PHYSICIAN: Gita Nogueira MD 
 
 
Chief Complaint:  
Cardiogenic Shock HPI: Don Kiser is a 71y.o. year old pleasant white male with a history of HTN, HLD, JOSE, CAD s/p cardiac arrest VFib s/p CABG (2011) c/b sternal would infection and sternectomy, ischemic cardiomyopathy LVEF 15-20%, s/p ICD and with LBBB. He was admitted with acute on chronic systolic heart failure with massive volume overload > 20 lbs, in the setting of atrial fibrillation s/p failed DCCV and underwent DCCV and RHC on 6/18.  S/p BiVICD on 6/21/19 with Dr. Amy Sanchez. He was discharged home home on IV milrinone on 6/26/19. [facility] has been followed closely by Dr. Arnoldo Deleon and the [facility]. 
  
Mr. Kiser was admitted for acute on chronic systolic heart failure. He underwent implantation of Impella 5.0 due to CS and has completed his LVAD evaluation. He meets criteria for implant of HM3 as DT. He was NYHA Class IV prior to implant of Impella 5.0, has LVEF < 15%, was intolerant of GDMT due to symptomatic hypotension and renal dysfunction. He remains dependent on temporary MCS support. RHC  revealed compensated hemodynamics on Impella. His renal function has improved dramatically with improvement in his hemodynamics. He is not a suitable transplant candidate due to single kidney, sternectomy, debility, and frailty. He was reviewed by [facility] and felt to be a high risk heart transplant candidate due to multiple co-morbidities as well as the afore mentioned conditions. He remained in the CCU and underwent a HM 3 implant as DT on 11/18. 24Hr Events Weaned off Jered Weaned off pressors/Epi Good UOP HM 3 implant Procedure:  Procedure(s): REMOVAL TEMPORARY LEFT VENTRICULAR ASSIST DEVICE, IMPLANTATION OF LEFT VENTRICULAR ASSIST DEVICE PERMANENT (VAD), ECC, JACQUE, EPI AORTIC US BY DR Reza Moss. POD:  1 Impression / Plan:  
Heart Failure Status: NYHA Class IV INTERMACS Category 2 Chronic systolic heart failure due to ICM, NYHA Class IV, EF < 15%, cardiogenic shock on Impella 5.0 support S/p Impella removal and LVAD implant Goal CVP < 15 mmHG, CI > 2, MAP ~ 65-85 mmHg Cont milrinone 0.2mcg/kg/min for now Will wean Dobutamine for above parameters Off pressors No BBrx while on dobutamine No AA/ARB/ARNI post op- will start as appropriate Anticoagulation for LVAD, INR goal 2-3 Hold ASA for platelets Start coumadin tonight 2mg Start Bivalrudin this evening at 1800 
  
Acute Respiratory Failure post op Adequate ventilation and oxygenation on current settings Wean to extubate today Pulmonary hygiene Monitor ABG- adjust settings accordingly Post op Pain Awake this morning Sedation weaned off PRN morphine- will assess management daily Comfort measures CKD 3, atrophic left kidney Appreciate Nephrology assistance Assess diuretic dosing daily 2mg Bumex today with blood transfusion Avoid nephrotoxins Trend labs  
  
CAD s/p CABG Intolerant of ASA due to thrombocytopenia No BB while on dobutamine Hold statin due to recent hepatic failure LHC performed 11/13 - low likelihood of benefit from revascularization  
  
Hx of VF arrest s/p BiV-ICD No recent shocks Keep K > 4 and Mg > 2  
   
PAF Currently in NSR, Paced Amiodarone per post op protocol  
  
Urinary retention, c/b serratia UTI and hematuria Appreciate Urology consult Cystoscopy performed 11/13 shows catheter induced cystitis  
  
Malnutrition Appreciate Nutritionist consult Prealbumin improved to 19 Resume Marinol on 11/21 Advance diet as tolerated when extubated  
  
COPD Appreciate Pulmonology assistance Continue nebs PRN   
PFTs complete - unable to perform DLCO due to Impella  
  
Anemia Multifactorial, due to hemolysis + epistaxis + hematuria Intolerant of octreotide or doxycycline for AVM ppx due to prolonged QTc Transfuse for Hgb goal > 8.5 prior to OR 
1 unit PRBC today  
  
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  
  
Hx of sternal wound infection, sternectomy Dr. Alessandra Francis has reviewed CTs Sternum closed post op- monitor  
  
Vocal cord paralysis Improved Debility Continue PT/OT when able  
  
Ppx Protonix PT/OT when able Post op antibiotics per routine Will resume Ancef for 2 weeks post op per Dr. Alessandra Francis for Impella prophylaxis Bowel regimen Critical care was necessary to treat or prevent imminent or life threatening deterioration of the following conditions: cardiac failure, respiratory failure and CNS failure or compromise Total Critical Care time spent: [id number] 
30 minutes. There was no overlap with other services Services Provided: 1. Telemetry review and 12 lead ECG interpretation 2. Hemodynamic interpretation, assessment, and management 3. Review and interpretation of CXR 4. Review and interpretation of lab values 5. Review and interpretation of microbiologic data and culture results 6. Review of medications and administration 7. Review and interpretation of nutrition requirements and management 8. Discussion of management withother consultants and services 9. Clinical update to family members Patient seen and examined. Data and note reviewed. I have discussed and agree with the plans as noted. 71year old male with a history of CAD, s/p CAB, ICM, CKD3, who underwent LVAD as DT. His post op course has been uncomplicated. Hemodynamically stable on IV dobutamine and IV milrinone - will wean IV dobutamine as tolerated. Wean vent today. Thank you for allowing us to participate in your patient's care. Mounika Cordero MD, Iris Rodríguez Chief of Cardiology, [facility] Medical Director [place] [address], [address] Office [phone] Fax [phone] History: 
Past Medical History:  
Diagnosis Date  Degenerative disc disease, lumbar  Heart failure ([facility] [address]  High cholesterol  Hypertension  Paroxysmal atrial fibrillation ([facility] [address] 2019  Spinal stenosis Past Surgical History:  
Procedure Laterality Date  COLONOSCOPY Left 2019 COLONOSCOPY at bedside performed by Isha Su MD at [address]  HX CORONARY ARTERY BYPASS GRAFT    
 triple  HX HERNIA REPAIR    
 HX IMPLANTABLE CARDIOVERTER DEFIBRILLATOR  WI CARDIOVERSION ELECTIVE ARRHYTHMIA EXTERNAL N/A 6/10/2019 EP CARDIOVERSION performed by Liz Renteria MD at [address] CATH LAB  WI CARDIOVERSION ELECTIVE ARRHYTHMIA EXTERNAL N/A 2019 EP CARDIOVERSION performed by Connie May MD at [address] [place] [address], [address] CATH LAB  WI INSJ/RPLCMT PERM DFB W/TRNSVNS LDS 1/DUAL CHMBR N/A 2019 INSERT ICD BIV MULTI performed by Alexi Mathew MD at [address] [facility] CATH LAB  WI TCAT IMPL WRLS P-ART PRS SNR L-T HEMODYN MNTR N/A 2019 IMPLANT HEART FAILURE MONITORING DEVICE performed by Loi Mancia MD at [address] [place] [address], [address] CATH LAB Social History Socioeconomic History  Marital status:  Spouse name: Not on file  Number of children: Not on file  Years of education: Not on file  Highest education level: Not on file Occupational History  Not on file Social Needs  Financial resource strain: Not on file  Food insecurity:  
  Worry: Not on file Inability: Not on file  Transportation needs:  
  Medical: Not on file Non-medical: Not on file Tobacco Use  Smoking status: Former Smoker Last attempt to quit: 2010 Years since quittin.9  Smokeless tobacco: Never Used Substance and Sexual Activity  Alcohol use: Not Currently Comment: rarely  Drug use: Never  Sexual activity: Not on file Lifestyle  Physical activity:  
  Days per week: Not on file Minutes per session: Not on file  Stress: Not on file Relationships  Social connections:  
  Talks on phone: Not on file Gets together: Not on file Attends Congregational service: Not on file Active member of club or organization: Not on file Attends meetings of clubs or organizations: Not on file Relationship status: Not on file  Intimate partner violence:  
  Fear of current or ex partner: Not on file Emotionally abused: Not on file Physically abused: Not on file Forced sexual activity: Not on file Other Topics Concern [address] Service Not Asked  Blood Transfusions Not Asked  Caffeine Concern Not Asked  Occupational Exposure Not Asked Dasie Hanks Hazards Not Asked  Sleep Concern Not Asked  Stress Concern Not Asked  Weight Concern Not Asked  Special Diet Not Asked  Back Care Not Asked  Exercise Not Asked  Bike Helmet Not Asked  Seat Belt Not Asked  Self-Exams Not Asked Social History Narrative  Not on file Family History Problem Relation Age of Onset  Lung Disease Mother  Hypertension Mother Gladystine Mower Arthritis-osteo Mother  Heart Disease Mother  Heart Disease Father  Diabetes Father Current Medications:  
Current Facility-Administered Medications Medication Dose Route Frequency Provider Last Rate Last Dose  epoetin yvonne-epbx (RETACRIT) injection 20,000 Units  20,000 Units SubCUTAneous Q7D Jatin Fink MD   20,000 Units at 11/19/19 0924  
 0.9% sodium chloride infusion 250 mL  250 mL IntraVENous PRN Kate Medrano MD      
 warfarin (COUMADIN) tablet 2 mg  2 mg Oral ONCE Shana Sims NP      
 bivalirudin (ANGIOMAX) 250 mg in 0.9% sodium chloride (MBP/ADV) 50 mL  0.02-2.5 mg/kg/hr IntraVENous TITRATE Ace Sims NP      
  bacitracin 500 unit/gram packet           
 0.9% sodium chloride infusion  9 mL/hr IntraVENous CONTINUOUS Josh Herrera, NP 9 mL/hr at 11/19/19 0758 9 mL/hr at 11/19/19 5552  0.45% sodium chloride infusion  10 mL/hr IntraVENous CONTINUOUS Johs Herrera, NP 10 mL/hr at 11/19/19 0759 10 mL/hr at 11/19/19 0759  sodium chloride (NS) flush 5-40 mL  5-40 mL IntraVENous Q8H Josh Herrera, NP   10 mL at 11/19/19 0531  
 sodium chloride (NS) flush 5-40 mL  5-40 mL IntraVENous PRN Josh Herrera, NP      
 acetaminophen (TYLENOL) tablet 650 mg  650 mg Oral Q6H PRN Josh Herrera, NP      
 oxyCODONE IR (ROXICODONE) tablet 5 mg  5 mg Oral Q4H PRN Josh Herrera, NP      
 oxyCODONE IR (ROXICODONE) tablet 10 mg  10 mg Oral Q4H PRN Josh Herrera, NP      
 naloxone [facility]) injection 0.4 mg  0.4 mg IntraVENous PRN Josh Herrera, NP      
 mupirocin (BACTROBAN) 2 % ointment   Both Nostrils BID Josh Herrera, TIERRA      
 levoFLOXacin (LEVAQUIN) 500 mg in D5W IVPB  500 mg IntraVENous Q24H Josh Herrera  mL/hr at 11/19/19 0524 500 mg at 11/19/19 0524  rifAMPin (RIFADIN) 600 mg in 0.9% sodium chloride 100 mL IVPB  600 mg IntraVENous DAILY Josh Herrera, NP   600 mg at 11/19/19 0431  fluconazole (DIFLUCAN) 200mg/100 mL IVPB (premix)  200 mg IntraVENous Q24H Josh Herrera,  mL/hr at 11/19/19 0453 200 mg at 11/19/19 0453  ondansetron (ZOFRAN) injection 4 mg  4 mg IntraVENous Q4H PRN Josh Herrera, NP   4 mg at 11/19/19 0530  
 albuterol-ipratropium (DUO-NEB) 2.5 MG-0.5 MG/3 ML  3 mL Nebulization Q6H PRN Josh Herrera NP      
 chlorhexidine (PERIDEX) 0.12 % mouthwash 10 mL  10 mL Oral BID Josh Herrera NP   10 mL at 11/19/19 0923  
 senna-docusate (PERICOLACE) 8.6-50 mg per tablet 1 Tab  1 Tab Oral DAILY Josh Herrera NP   Stopped at 11/19/19 0900  polyethylene glycol (MIRALAX) packet 17 g  17 g Oral DAILY Sharon, Angelito Corcoran, NP   Stopped at 11/19/19 0900  
 bisacodyl (DULCOLAX) tablet 5 mg  5 mg Oral DAILY PRN Angelito Herrera NP      
 acetaminophen (OFIRMEV) infusion 1,000 mg  1,000 mg IntraVENous Q6H PRN Angelito Herrera Dye,  mL/hr at 11/19/19 0355 1,000 mg at 11/19/19 0355  pantoprazole (PROTONIX) 40 mg in sodium chloride 0.9% 10 mL injection  40 mg IntraVENous Q12H Angelito Herrera, NP   40 mg at 11/19/19 6926  
 sucralfate (CARAFATE) tablet 1 g  1 g Oral AC&HS Tammy Vidal NP   Stopped at 11/18/19 1630  
 0.9% sodium chloride infusion 250 mL  250 mL IntraVENous PRN Thais Ward MD      
 Vancomycin - Pharmacy dosing   Other Rx Dosing/Monitoring Tammy Vidal NP      
 influenza vaccine 2019-20 (6 mos+)(PF) (FLUARIX/FLULAVAL/FLUZONE QUAD) injection 0.5 mL  0.5 mL IntraMUSCular PRIOR TO DISCHARGE Douglas Altman MD      
 vancomycin (VANCOCIN) 1250 mg in  ml infusion  1,250 mg IntraVENous Q16H Angelito Herrera,  mL/hr at 11/18/19 2332 1,250 mg at 11/18/19 2332  hydrALAZINE (APRESOLINE) 20 mg/mL injection 20 mg  20 mg IntraVENous Q6H PRN Shana Sims NP   20 mg at 11/19/19 0547  morphine injection 4 mg  4 mg IntraVENous Q2H PRN Leopold Grates B, NP   4 mg at 11/19/19 4964  dextrose 10 % infusion 125-250 mL  125-250 mL IntraVENous PRN Mounika Altman MD   Stopped at 11/18/19 0700  
 milrinone (PRIMACOR) 20 MG/100 ML D5W infusion  0-0.75 mcg/kg/min IntraVENous TITRATE Leopold Grates B, NP 5.3 mL/hr at 11/19/19 0759 0.2 mcg/kg/min at 11/19/19 0759  DOBUTamine (DOBUTREX) 1,000 mg/250 mL (4,000 mcg/mL) infusion  0-10 mcg/kg/min IntraVENous TITRATE KatelynndAngelito Dye, NP 6.9 mL/hr at 11/19/19 0759 5 mcg/kg/min at 11/19/19 0759  
 nitroglycerin (Tridil) 200 mcg/ml infusion  0-200 mcg/min IntraVENous TITRATE KatelynndAngelito Dye, NP      
 amiodarone (CORDARONE) 900 mg/250 ml D5W infusion  0.5 mg/min IntraVENous CONTINUOUS Win Herrera NP 8.3 mL/hr at 11/19/19 0759 0.5 mg/min at 11/19/19 0759  dexmedeTOMidine (PRECEDEX) 400 mcg in 0.9% sodium chloride 100 mL infusion  0.1-0.9 mcg/kg/hr IntraVENous TITRATE Win Herrera NP   Stopped at 11/19/19 1000  EPINEPHrine (ADRENALIN) 10 mg in 0.9% sodium chloride 250 mL infusion  0-10 mcg/min IntraVENous TITRATE Win Herrera NP   Stopped at 11/18/19 1615  
 insulin regular (NOVOLIN R, HUMULIN R) 100 Units in 0.9% sodium chloride 100 mL infusion  1-50 Units/hr IntraVENous TITRATE Win Herrera NP 0.5 mL/hr at 11/19/19 0912 0.5 Units/hr at 11/19/19 0912  
 niCARdipine (CARDENE) 25 mg in 0.9% sodium chloride 250 mL infusion  0-15 mg/hr IntraVENous TITRATE Win Herrera NP      
 NOREPINephrine (LEVOPHED) 32 mg in dextrose 5% 250 mL (128 mcg/mL) infusion  0.5-16 mcg/min IntraVENous TITRATE Win Herrera NP   Stopped at 11/18/19 1150  PHENYLephrine (JUAN PABLO-SYNEPHRINE) 100 mg in 0.9% sodium chloride 250 mL infusion   mcg/min IntraVENous TITRATE Win Herrera NP      
 vasopressin (VASOSTRICT) 20 Units in 0.9% sodium chloride 100 mL infusion  0-0.04 Units/min IntraVENous TITRATE iWn Herrera NP   Stopped at 11/18/19 1527  
 ELECTROLYTE REPLACEMENT NOTE: Nurse to review Serum Potassium and Magnesuim levels and Initiate Electrolyte Replacement Protocol as needed  1 Each Other PRN Morgan Del Toro NP Allergies: No Known Allergies ROS:   
Review of Systems Unable to perform ROS: Acuity of condition Physical Exam:  
Physical Exam  
Constitutional: He appears well-developed and well-nourished. HENT:  
intubated Neck: Neck supple. Cardiovascular: Normal rate, regular rhythm and normal heart sounds. + VAD hum Pulmonary/Chest: On vent Abdominal: Soft. Bowel sounds are normal. He exhibits no distension. Musculoskeletal: He exhibits edema. R > L Neurological:  
Sedated Skin: Skin is warm and dry. Nursing note and vitals reviewed. Vitals:  
Visit Vitals BP (!) 114/94 Pulse 85 Temp 98.4 °F (36.9 °C) Resp 21 Ht 6' 2\" (1.88 m) Wt 200 lb 3.2 oz (90.8 kg) SpO2 95% BMI 25.70 kg/m² Temp (24hrs), Av.4 °F (36.9 °C), Min:97.1 °F (36.2 °C), Max:99.2 °F (37.3 °C) Hemodynamics: 
 CO: CO (l/min): 7.4 l/min CI: CI (l/min/m2): 3.4 l/min/m2 CVP: CVP (mmHg): 9 mmHg (19) SVR: SVR (dyne*sec)/cm5: 659 (dyne*sec)/cm5 (19) PAP Systolic: PAP Systolic: 43 ( 1010) PAP Diastolic: PAP Diastolic: 20 ( 9791) PVR:   
 SV02: SVO2 (%): 65 % (19) SCV02: SCVO2 (%): 75 % (10/29/19 1900) Admission Weight: Last Weight Weight: 192 lb 10.9 oz (87.4 kg) Weight: 200 lb 3.2 oz (90.8 kg) Intake / Output / Drain: 
Last 24 hrs.:  
 
Intake/Output Summary (Last 24 hours) at 2019 1121 Last data filed at 2019 1100 Gross per 24 hour Intake 7170.23 ml Output 3293 ml Net 3877.23 ml Drains:  
24h: 770 8h: 390 Ventilator: 
Ventilator Volumes Vt Set (ml): 550 ml (19 08) Vt Exhaled (Machine Breath) (ml): 639 ml (19) Vt Spont (ml): 618 ml (19 0430) Ve Observed (l/min): 15.1 l/min (19) Extubation Date / Time:  19 at 1030am 
 
Oxygen Therapy: 
Oxygen Therapy O2 Sat (%): 95 % (19 1100) Pulse via Oximetry: 67 beats per minute (19 1100) O2 Device: Endotracheal tube;Ventilator (19 0800) O2 Flow Rate (L/min): 4 l/min (19 0400) FIO2 (%): 50 %(FiO2 changed by Vidal Mcnally MD) (19 6733) VAD Data: LVAD (Heartmate) Pump Speed (RPM): 5200 Pump Flow (LPM): 4.2 PI (Pulsitility Index): 3.6 Power: 3.6 MAP: 78  Test: Yes 
Back Up  at Bedside & Labeled: Yes Power Module Test: Yes Driveline Site Care: No 
Driveline Dressing: Clean, Dry, and Intact Drive Line Exam: Stabilization device intact: yes Line inspected for damage: yes Appearance: no edema, erythema, drainage Stabilization Method: anchor to abd Recent Labs:  
Labs Latest Ref Rng & Units 11/19/2019 11/18/2019 11/18/2019 11/18/2019 11/18/2019 11/18/2019 11/17/2019 WBC 4.1 - 11.1 K/uL 3. 9(L) - - 4. 0(L) - 5.0 -  
RBC 4.10 - 5.70 M/uL 2.43(L) - - 2.91(L) - 3.12(L) - Hemoglobin 12.1 - 17.0 g/dL 7. 3(L) - 8. 0(L) 8.8(L) - 9. 4(L) 9.4(L) Hematocrit 36.6 - 50.3 % 23. 1(L) - 25. 2(L) 27. 9(L) - 30. 6(L) -  
MCV 80.0 - 99.0 FL 95.1 - - 95.9 - 98.1 - Platelets 361 - 082 K/uL 74(L) - - 88(L) - 90(L) 79(L) Lymphocytes 12 - 49 % 18 - - 7(L) - - - Monocytes 5 - 13 % 9 - - 7 - - - Eosinophils 0 - 7 % 0 - - 0 - - - Basophils 0 - 1 % 1 - - 0 - - - Albumin 3.5 - 5.0 g/dL 2. 7(L) 2. 6(L) - - 2. 2(L) 2. 8(L) -  
Calcium 8.5 - 10.1 MG/DL 8.4(L) 8. 3(L) - - 8. 3(L) 8.6 -  
SGOT 15 - 37 U/L 52(H) 50(H) - - 50(H) 52(H) -  
Glucose 65 - 100 mg/dL 102(H) 106(H) - - 178(H) 109(H) -  
BUN 6 - 20 MG/DL 19 18 - - 14 14 - Creatinine 0.70 - 1.30 MG/DL 1.30 1.26 - - 1.26 1.35(H) - Sodium 136 - 145 mmol/L 141 141 - - 141 135(L) - Potassium 3.5 - 5.1 mmol/L 3.9 4.3 - - 3. 0(L) 3.5 -  
TSH 0.36 - 3.74 uIU/mL - - - - - - -  
PSA 0.01 - 4.0 ng/mL - - - - - - -  
LDH 85 - 241 U/L 491(H) - - - 630(H) 910(H) -  
CEA ng/mL - - - - - - - Some recent data might be hidden EKG:  
EKG Results Procedure 720 Value Units Date/Time EKG, 12 LEAD, INITIAL [[id number]] Collected:  11/19/19 0417 Order Status:  Completed Updated:  11/19/19 5776 Ventricular Rate 76 BPM   
  Atrial Rate 0 BPM   
  QRS Duration 156 ms   
  Q-T Interval 564 ms QTC Calculation (Bezet) 634 ms Calculated R Axis 81 degrees Calculated T Axis 120 degrees Diagnosis -- Electronic ventricular pacemaker When compared with ECG of 13-NOV-2019 04:40, 
Vent. rate has increased BY   2 BPM 
  
 EKG, 12 LEAD, INITIAL [[id number]] Order Status:  Canceled EKG, 12 LEAD, INITIAL [[id number]] Order Status:  Canceled EKG, 12 LEAD, INITIAL [[id number]] Order Status:  Canceled EKG, 12 LEAD, INITIAL [[id number]] Order Status:  Canceled EKG, 12 LEAD, INITIAL [[id number]] Order Status:  Canceled EKG, 12 LEAD, INITIAL [[id number]] Collected:  11/13/19 0440 Order Status:  Completed Updated:  11/13/19 2200 Ventricular Rate 74 BPM   
  Atrial Rate 66 BPM   
  QRS Duration 166 ms   
  Q-T Interval 488 ms QTC Calculation (Bezet) 541 ms Calculated R Axis -15 degrees Calculated T Axis 157 degrees Diagnosis -- Electronic ventricular pacemaker When compared with ECG of 11-NOV-2019 04:49, No significant change was found Confirmed by He Wei M.D., Mk Allison ([id number]) on 11/13/2019 10:00:38 PM 
  
 EKG, 12 LEAD, INITIAL [[id number]] Order Status:  Canceled EKG, 12 LEAD, INITIAL [[id number]] Collected:  11/11/19 0449 Order Status:  Completed Updated:  11/11/19 0026 Ventricular Rate 74 BPM   
  Atrial Rate 39 BPM   
  QRS Duration 158 ms Q-T Interval 504 ms QTC Calculation (Bezet) 559 ms Calculated R Axis 13 degrees Calculated T Axis 175 degrees Diagnosis -- Electronic ventricular pacemaker When compared with ECG of 05-NOV-2019 10:44, 
Vent. rate has decreased BY   9 BPM 
Confirmed by He Wei M.D., Mk Allison ([id number]) on 11/11/2019 8:54:27 AM 
  
 EKG, 12 LEAD, INITIAL [[id number]] Order Status:  Canceled EKG, 12 LEAD, INITIAL [[id number]] Order Status:  Canceled EKG, 12 LEAD, INITIAL [[id number]] Order Status:  Canceled EKG, 12 LEAD, INITIAL [[id number]] Collected:  11/05/19 1044 Order Status:  Completed Updated:  11/05/19 1226 Ventricular Rate 83 BPM   
  Atrial Rate 83 BPM   
  P-R Interval 80 ms QRS Duration 162 ms Q-T Interval 502 ms QTC Calculation (Bezet) 589 ms Calculated R Axis 2 degrees Calculated T Axis -156 degrees Diagnosis -- Electronic ventricular pacemaker Marked T wave abnormality, consider  
anterolateral ischemia When compared with ECG of 02-NOV-2019 10:42, No significant change was found Confirmed by Alfonzo Alarcon M.D., Chris Stallworth ([id number]) on 11/5/2019 12:25:52 PM 
  
 EKG, 12 LEAD, INITIAL [[id number]] Collected:  11/02/19 1042 Order Status:  Completed Updated:  11/03/19 3913 Ventricular Rate 91 BPM   
  Atrial Rate 91 BPM   
  P-R Interval 108 ms QRS Duration 148 ms Q-T Interval 524 ms QTC Calculation (Bezet) 644 ms Calculated R Axis 5 degrees Calculated T Axis -152 degrees Diagnosis --  
  Sinus rhythm with short ME with occasional premature ventricular complexes  
and fusion complexes Nonspecific intraventricular block Marked T wave abnormality, consider anterolateral ischemia When compared with ECG of 26-OCT-2019 06:55, Sinus rhythm has replaced Electronic ventricular pacemaker Prolonged QT Confirmed by Tre Dolan ([id number]) on 11/3/2019 6:35:23 AM 
  
 EKG, 12 LEAD, INITIAL [[id number]] Order Status:  Canceled EKG, 12 LEAD, INITIAL [[id number]] Collected:  10/26/19 6199 Order Status:  Completed Updated:  10/26/19 1807 Ventricular Rate 80 BPM   
  Atrial Rate 76 BPM   
  QRS Duration 174 ms Q-T Interval 462 ms QTC Calculation (Bezet) 532 ms Calculated R Axis 28 degrees Calculated T Axis 150 degrees Diagnosis -- Atrial flutter Left bundle branch block When compared with ECG of 25-OCT-2019 18:20, 
Electronic demand pacing is not noted Confirmed by Lee Little ([id number]) on 10/26/2019 6:07:36 PM 
  
 EKG, 12 LEAD, INITIAL [[id number]] Collected:  10/25/19 1820 Order Status:  Completed Updated:  10/26/19 1749 Ventricular Rate 72 BPM   
  Atrial Rate 72 BPM   
  P-R Interval 86 ms QRS Duration 116 ms   
  Q-T Interval 506 ms QTC Calculation (Bezet) 554 ms Calculated P Axis 9 degrees Calculated R Axis -68 degrees Calculated T Axis 10 degrees Diagnosis --  
  undetermied rhythm possible atrial flutter Nonspecific intraventricular conduction delay Ventricular paced rhythm When compared with ECG of 26-JUN-2019 11:39, 
Significant changes have occurred Confirmed by Erik Steel ([id number]) on 10/26/2019 5:49:02 PM 
  
  
No specialty comments available. 10/25/19 OR ECHO JACQUE INTRAOP  11/18/2019 Narrative See Anesthesia Procedure note for procedure details. Signed by:   
  
Echo Results  (Last 48 hours) None CXR Results  (Last 48 hours)  
          
 11/19/19 0447  XR CHEST PORT Final result Impression:  IMPRESSION: Stable chest x-ray appearance with mild interstitial pulmonary edema  
and patchy left basilar atelectasis again noted. Narrative:  EXAM: XR CHEST PORT INDICATION: post LVAD implant COMPARISON: 11/18/2019 FINDINGS: A portable AP radiograph of the chest was obtained at 0350 hours. Median sternotomy wires are again shown as are left axillary AICD and LVAD. The  
endotracheal tube, transesophageal tube and right IJ pulmonary arterial and  
central venous catheters appear unchanged in position. Mediastinal and bilateral  
chest tubes are again shown. No pneumothorax is demonstrated. There are patchy  
left basilar atelectasis and perhaps a small left pleural effusion. There is  
mild interstitial pulmonary edema again noted. Cardiac and mediastinal/hilar  
contours are stable. 11/18/19 1454  XR CHEST PORT Final result Impression:  IMPRESSION:  
1. No pneumothorax 2. Interstitial edema Narrative:  EXAM: XR CHEST PORT INDICATION: post LVAD implant COMPARISON: 11/18/2019 at [facility] FINDINGS: A portable AP radiograph of the chest was obtained at 1423 hours. The  
patient is on a cardiac monitor. The LVAD is noted. Patient status post median sternotomy. The Impella device has been removed. [place]-Russ catheter has its tip  
in the main pulmonary artery. Right IJ catheter overlies the SVC. ET tube is in satisfactory position. NG tube courses into the stomach. Mediastinal pleural drains are noted. No pneumothorax. There is mild interstitial edema. There is atelectasis in left  
lower lobe and a small left pleural effusion. 11/18/19 0445  XR CHEST PORT Final result Impression:  IMPRESSION:  
1. Increasing bilateral pleural effusions with increasing bibasilar atelectasis. 2. No change mild pulmonary edema Narrative:  EXAM: XR CHEST PORT INDICATION: postop heart cardiac assist device COMPARISON: 11/17/2019 FINDINGS: A portable AP radiograph of the chest was obtained at 0359 hours. The  
patient is on a cardiac monitor. Right IJ catheter overlies the SVC. Cardiac  
assist device is in satisfactory position. ICD is in satisfactory position. Overall aeration has decreased. There are small pleural effusions which have  
increased. There is bibasilar atelectasis which has increased. Pulmonary edema  
pattern is unchanged. No pneumothorax. 11/17/19 1150  XR CHEST PORT Final result Impression:  IMPRESSION:  
1. No complicating features post line placement. Narrative:  INDICATION: Line placement EXAM: Portable chest 11:30 hours . Comparison 11/17/2019. FINDINGS: A right neck central venous catheter has been placed. Tip overlies the  
superior vena cava. Cardiac silhouette remains enlarged. Impella and AICD  
unchanged. Mild interstitial prominence unchanged. No pneumothorax or effusion [employer] [address], [address] [address] Office [phone] Fax [phone] 
24 hour VAD/HF Pager: [phone]

## 2019-11-20 NOTE — PROGRESS NOTES
Problem: Dysphagia (Adult) Goal: *Acute Goals and Plan of Care (Insert Text) Description Speech Pathology Re-evaluation 11/20/2019 1. Patient will tolerate mechanical soft/thin liquid diet with no overt s/s aspiration within 7 days Initiated 10/28/19 1. Patient will tolerate regular diet/thin liquids without overt s/s of aspiration within 7 days MET 2. Patient will participate in [facility] for further objective assessment of swallow physiology within 7 days (once medically stable from Impella/cardiac sx standpoint) NOT MET; discontinue 11/20/2019 1008 by NICO Mercado Outcome: Progressing Towards Goal 
11/20/2019 1007 by NICO Mercado Reactivated SPEECH LANGUAGE PATHOLOGY BEDSIDE SWALLOW EVALUATION Patient: Princess Kim (75 y.o. male) Date: 11/20/2019 Primary Diagnosis: Acute decompensated heart failure ([facility] [address] [I50.9] Procedure(s) (LRB): REMOVAL TEMPORARY LEFT VENTRICULAR ASSIST DEVICE, IMPLANTATION OF LEFT VENTRICULAR ASSIST DEVICE PERMANENT (VAD), ECC, JACQUE, EPI AORTIC US BY DR Skyla Grant. (Left) 2 Days Post-Op Precautions: swallow  Fall(R axillary impella) ASSESSMENT : 
SLP was re-consulted s/p intubation for LVAD and RN report of patient now coughing with thin liquids. Based on the objective data described below, the patient presents with mild oral and mild-moderate pharyngeal dysphagia characterized by prolonged mastication, suspected delayed swallow initiation, and decreased laryngeal elevation. Patient with strong cough and expectoration with thin liquids via straw, however no overt s/s aspiration observed with thin liquids via straw. Patient will benefit from skilled intervention to address the above impairments. Patients rehabilitation potential is considered to be Good Factors which may influence rehabilitation potential include:  
[]            None noted []            Mental ability/status [x]            Medical condition []            Home/family situation and support systems 
[]            Safety awareness 
[]            Pain tolerance/management []            Other: PLAN : 
Recommendations and Planned Interventions: 
--Mechanical soft/thin liquid diet. Patient reported he would prefer mechanical soft solids given difficulty with L arm that limits his ability to cut his food 
--NO STRAWS 
--Meds as tolerated, may need whole in puree 
--SLP to follow for diet tolerance and MBS as medically appropriate Frequency/Duration: Patient will be followed by speech-language pathology 2 times a week to address goals. Discharge Recommendations: To Be Determined SUBJECTIVE:  
Patient stated I cough more when I'm eating.  Patient alert, appropriate, cooperative. Oriented x4. OBJECTIVE:  
 
Past Medical History:  
Diagnosis Date  Degenerative disc disease, lumbar  Heart failure ([address]  High cholesterol  Hypertension  Paroxysmal atrial fibrillation ([address] 4/2/2019  Spinal stenosis Past Surgical History:  
Procedure Laterality Date  COLONOSCOPY Left 11/11/2019 COLONOSCOPY at bedside performed by Viviane Haile MD at [address]  HX CORONARY ARTERY BYPASS GRAFT    
 triple  HX HERNIA REPAIR    
 HX IMPLANTABLE CARDIOVERTER DEFIBRILLATOR  AK CARDIOVERSION ELECTIVE ARRHYTHMIA EXTERNAL N/A 6/10/2019 EP CARDIOVERSION performed by Kendrick Quintero MD at [place] [address] CATH LAB  AK CARDIOVERSION ELECTIVE ARRHYTHMIA EXTERNAL N/A 6/18/2019 EP CARDIOVERSION performed by Bishop Nadya MD at [place] [address], [address] CATH LAB  AK INSJ/RPLCMT PERM DFB W/TRNSVNS LDS 1/DUAL CHMBR N/A 6/21/2019 INSERT ICD BIV MULTI performed by Lizandro Zaragoza MD at [place] [address] CATH LAB  AK TCAT IMPL WRLS P-ART PRS SNR L-T HEMODYN MNTR N/A 9/18/2019  IMPLANT HEART FAILURE MONITORING DEVICE performed by Rocio Urban MD at [place] [address] CATH LAB  
 
 Prior Level of Function/Home Situation:  
Home Situation Home Environment: Private residence # Steps to Enter: 0 One/Two Story Residence: One story Living Alone: No 
Support Systems: Spouse/Significant Other/Partner Patient Expects to be Discharged to[de-identified] Unknown Current DME Used/Available at Home: Cane, straight, Walker, rolling, CPAP Tub or Shower Type: Shower Diet prior to admission: regular/thin Current Diet:  full liquid Cognitive and Communication Status: 
Neurologic State: Alert, Appropriate for age Orientation Level: Oriented X4 Cognition: Follows commands, Appropriate for age attention/concentration Perception: Appears intact Perseveration: No perseveration noted Safety/Judgement: Awareness of environment Oral Assessment: 
Oral Assessment Labial: No impairment Dentition: Upper & lower dentures Oral Hygiene: moist oral mucosa, erythema on velum, uvula Lingual: No impairment Velum: No impairment Mandible: No impairment P.O. Trials: 
Patient Position: sitting in chair Vocal quality prior to P.O.: Hoarse Consistency Presented: Ice chips; Thin liquid;Puree; Solid How Presented: Self-fed/presented;Cup/sip;Cup/gulp; Spoon;Straw;Successive swallows Bolus Acceptance: No impairment Bolus Formation/Control: Impaired Type of Impairment: Delayed;Mastication Propulsion: Delayed (# of seconds) Oral Residue: None Initiation of Swallow: Delayed (# of seconds) Laryngeal Elevation: Decreased Aspiration Signs/Symptoms: Strong cough; Other (comment)(with thin via straw) Effective Modifications: Alternate liquids/solids;Small sips and bites;Cup/sip Cues for Modifications: Minimal 
  
 
Oral Phase Severity: Mild Pharyngeal Phase Severity : Mild-moderate NOMS:  
The NOMS functional outcome measure was used to quantify this patient's level of swallowing impairment. Based on the NOMS, the patient was determined to be at level 5 for swallow function NOMS Swallowing Levels: Level 1 (CN): NPO Level 2 (CM): NPO but takes consistency in therapy Level 3 (CL): Takes less than 50% of nutrition p.o. and continues with nonoral feedings; and/or safe with mod cues; and/or max diet restriction Level 4 (CK): Safe swallow but needs mod cues; and/or mod diet restriction; and/or still requires some nonoral feeding/supplements Level 5 (CJ): Safe swallow with min diet restriction; and/or needs min cues Level 6 (CI): Independent with p.o.; rare cues; usually self cues; may need to avoid some foods or needs extra time Level 7 (CH): Independent for all p.o. ECHO. (2003). National Outcomes Measurement System (NOMS): Adult Speech-Language Pathology User's Guide. Pain: 
Pain Scale 1: Numeric (0 - 10) Pain Intensity 1: 1 Pain Location 1: Arm After treatment:  
[x]            Patient left in no apparent distress sitting up in chair 
[]            Patient left in no apparent distress in bed 
[x]            Call bell left within reach [x]            Nursing notified 
[]            Caregiver present 
[]            Bed alarm activated COMMUNICATION/EDUCATION:  
The patients plan of care including recommendations, planned interventions, and recommended diet changes were discussed with: Registered Nurse. Patient was educated regarding His deficit(s) of dysphagia as this relates to His diagnosis of s/p intubated. He demonstrated Good understanding as evidenced by verbalizing understanding. [x]            Posted safety precautions in patient's room. [x]            Patient/family have participated as able in goal setting and plan of care. [x]            Patient/family agree to work toward stated goals and plan of care. []            Patient understands intent and goals of therapy, but is neutral about his/her participation. []            Patient is unable to participate in goal setting and plan of care. Thank you for this referral. 
Selina Sawyer, SLP Time Calculation: 20 mins

## 2019-11-20 NOTE — PROGRESS NOTES
ID Progress Note 
2019 Subjective:  
 
Remains off vent, looks good Objective: Antibiotics: 1. Levaquin 2. Rifampin 3. Fluconazole 4. Vancomycin Vitals:  
Visit Vitals BP 99/78 (BP 1 Location: Right arm) Pulse 90 Temp 98.9 °F (37.2 °C) Resp 20 Ht 6' 2\" (1.88 m) Wt 92.4 kg (203 lb 11.2 oz) SpO2 96% BMI 26.15 kg/m² Tmax:  Temp (24hrs), Av.6 °F (37 °C), Min:98.4 °F (36.9 °C), Max:98.9 °F (37.2 °C) Exam:  Lungs:  clear to auscultation bilaterally Heart:  regular rate and rhythm Abdomen:  soft, non-tender. Bowel sounds normal. No masses,  no organomegaly Grossly bloody urine in catheter Labs:     
Recent Labs  
  19 
0301 19 
1858 19 
0349 19 
2320 19 
1851 19 
1434 19 
1432 19 
0414 19 
2232 WBC 6.6  --  3.9*  --   --  4.0*  --  5.0  --   
HGB 8.0* 7.9* 7.3*  --  8.0* 8.8*  --  9.4* 9.4* PLT 92*  --  74*  --   --  88*  --  90* 79* BUN 22*  --  19 18  --   --  14 14  --   
CREA 1.43*  --  1.30 1.26  --   --  1.26 1.35*  --   
SGOT 60*  --  52* 50*  --   --  50* 52*  --   
AP 72  --  66 67  --   --  76 141*  --   
TBILI 5.1*  --  3.4* 3.7*  --   --  3.0* 1.4*  --   
 
 
Cultures: No results found for: DANIELLE Lab Results Component Value Date/Time Culture result: NO GROWTH 5 DAYS 2019 11:18 AM  
 Culture result: SERRATIA MARCESCENS (A) 10/31/2019 10:05 AM  
 Culture result: SERRATIA MARCESCENS (2ND COLONY TYPE/STRAIN) (A) 10/31/2019 10:05 AM  
 Culture result: ENTEROCOCCUS FAECALIS GROUP D (A) 10/31/2019 10:05 AM  
 
 
Radiology:  
 
Line/Insert Date:        
 
Assessment:  
 
1. UTI--Serratia (2 biotypes) from culture and small amount of Enterococcus 2. CHF/cardiomyopathy Objective: 1. Continue current therapy 2. LVAD Shea León MD

## 2019-11-20 NOTE — PROGRESS NOTES
0745: Bedside and Verbal shift change report given to Judah Garay RN (oncoming nurse) by Swetha Arevalo RN (offgoing nurse). Report included the following information SBAR, ED Summary, OR Summary, Procedure Summary, Intake/Output, MAR, Recent Results, Cardiac Rhythm Paced and Alarm Parameters . 4184: HF team at bedside for rounds, orders to wean dobutamine as pt's CI tolerates. Continue to monitor CVP/fluid status. Continue to do hourly doppler MAPs until cuff MAP matches. Informed team of pt's coughing while attempting to eat breakfast.  SLP will be consulted to see pt 
 
1105: Dr. Shannan Vale at bedside to assess LUE, she feels that a nerve could have be irritated during surgery/arterial line placement. Will continue to monitor 1500: Pt's wife at bedside to observe driveline dressing change. Followed along with instructions provided in kit. All questions answered. RN also assisted pt's wife with learning how to put on sterile gloves. 1740: PTT results, second therapeutic result obtained at this time. PTT to be checked again with morning labs in 12 hours 1935: Bedside and Verbal shift change report given to Swetha Arevalo RN (oncoming nurse) by Judah Garay RN (offgoing nurse). Report included the following information SBAR, ED Summary, OR Summary, Procedure Summary, Intake/Output, MAR, Recent Results, Cardiac Rhythm Paced and Alarm Parameters .

## 2019-11-20 NOTE — PROGRESS NOTES
2000: Received report from Nae Kelly, [facility]. Drips dual verified. No issues with LVAD. Pain, numbness, tingling from elbow to fingertips on MD JACLYN aware. LUE fingertips dusky and hand is cool to touch, trace non pitting edema, doppler pulses present brachial and arterial. Lidocaine patch in place. Doppler MAP 3 points lower than BP cuff Maps, pulses dopplerable. 2100: Parveen Aguirre NP with HF paged to clarify doppler MAP order- orders received to do Q1 doppler MAPS overnight. Aware of pain in LUE. 
 
0000: LUE remains unchanged, pain/numbness/tingling continue to persist. Doppler pulses still present brachial and arterial.  
HYPOGLYCEMIC EPISODE DOCUMENTATION 
BG value(s) pre-treatment 75 Was patient symptomatic? [] yes, [x] no Patient was treated with the following rescue medications/treatments: [] D50 [] Glucose tablets 
              [] Glucagon 
              [x] 4oz juice 
              [] 6oz reg soda 
              [] 8oz low fat milk BG value post-treatment: Once BG treated and value greater than 80mg/dl, pt was provided with the following: 
[x] snack 
[] meal 
 
0630: Dr. Marcus Ortiz at bedside, updated on upward trend of CVP and low urine output overnight. Orders received to give 2 mg bumex now. 0800: Bedside and Verbal shift change report given to Nae Kelly, RN (oncoming nurse) by  [employer] RN (offgoing nurse).

## 2019-11-20 NOTE — PROGRESS NOTES
[facility] 
 [address] [facility] [place], [address] [facility] Phone: [phone]   [phone]   
  
Nephrology Progress Note Sona Kiser     1950     [id number] Date of Admission : 10/25/2019 
11/20/19 CC:  Follow up for JUAN J, CKD, Hyponatremia Assessment and Plan JUAN J on CKD 
- 2/2 CRS and urinary retention - Cr stable, voided well overnight 
- bumex 2mg IV x 1 this AM 
- daily labs for now 
  
Acute on Chronic HFrEF  
- EF 16-20%, NYHA Class IV , hx of VF arrest - s/p AICD 
- Impella insertion 10/29- removed 11/18  
- s/p LVAD placement on 11/18 
- On Milrinone and dobutamine CKD Stage III  
- Mild Left renal atrophy at baseline Gross hematuria, BPH w/ retention: 
- off CBI pre VAD. cysto showed catheter related Cystitis @ postr wall  
- keep neal for today Hoarseness, vocal cord paralysis, mediastinal adenopathy: 
- will need eventual PET/CT 
  
Acute Resp failure JOSE on CPAP  
- Post op resp failure : On Vent  
  
PAF s/p DCCV 6/19 
  
Anemia: 
- from blood loss s/p multiple blood products - s/p IV iron,  Repeat iron studies ok 
- on PAVEL q7 d Serratia and Enteroccocus UTI: 
- completed abx Interval History:   
Seen and examined. On milrinone and dobutamine, BP stable. Cr stable and voiding.  hgb and plts stable. Mild sob, no cp. C/o L arm weakness Review of Systems: Pertinent items are noted in HPI. Current Medications:  
Current Facility-Administered Medications Medication Dose Route Frequency  potassium chloride 20 mEq in 50 ml IVPB  20 mEq IntraVENous ONCE  
 magnesium sulfate 1 g/100 ml IVPB (premix or compounded)  1 g IntraVENous ONCE  
 epoetin yvonne-epbx (RETACRIT) injection 20,000 Units  20,000 Units SubCUTAneous Q7D  
 0.9% sodium chloride infusion 250 mL  250 mL IntraVENous PRN  
 ceFAZolin (ANCEF) 1 g in 0.9% sodium chloride (MBP/ADV) 50 mL  1 g IntraVENous Q8H  
  [START ON 11/21/2019] dronabinol (MARINOL) capsule 2.5 mg  2.5 mg Oral ACB&D  
 bivalirudin (ANGIOMAX) 250 mg in 0.9% sodium chloride (MBP/ADV) 50 mL  0.02-2.5 mg/kg/hr IntraVENous TITRATE  lidocaine (LIDODERM) 5 % patch 1 Patch  1 Patch TransDERmal Q24H  
 0.9% sodium chloride infusion  9 mL/hr IntraVENous CONTINUOUS  
 0.45% sodium chloride infusion  10 mL/hr IntraVENous CONTINUOUS  
 sodium chloride (NS) flush 5-40 mL  5-40 mL IntraVENous Q8H  
 sodium chloride (NS) flush 5-40 mL  5-40 mL IntraVENous PRN  
 acetaminophen (TYLENOL) tablet 650 mg  650 mg Oral Q6H PRN  
 oxyCODONE IR (ROXICODONE) tablet 5 mg  5 mg Oral Q4H PRN  
 oxyCODONE IR (ROXICODONE) tablet 10 mg  10 mg Oral Q4H PRN  
 naloxone (NARCAN) injection 0.4 mg  0.4 mg IntraVENous PRN  
 mupirocin (BACTROBAN) 2 % ointment   Both Nostrils BID  ondansetron (ZOFRAN) injection 4 mg  4 mg IntraVENous Q4H PRN  
 albuterol-ipratropium (DUO-NEB) 2.5 MG-0.5 MG/3 ML  3 mL Nebulization Q6H PRN  chlorhexidine (PERIDEX) 0.12 % mouthwash 10 mL  10 mL Oral BID  senna-docusate (PERICOLACE) 8.6-50 mg per tablet 1 Tab  1 Tab Oral DAILY  polyethylene glycol (MIRALAX) packet 17 g  17 g Oral DAILY  bisacodyl (DULCOLAX) tablet 5 mg  5 mg Oral DAILY PRN  pantoprazole (PROTONIX) 40 mg in sodium chloride 0.9% 10 mL injection  40 mg IntraVENous Q12H  
 sucralfate (CARAFATE) tablet 1 g  1 g Oral AC&HS  
 0.9% sodium chloride infusion 250 mL  250 mL IntraVENous PRN  Vancomycin - Pharmacy dosing   Other Rx Dosing/Monitoring  influenza vaccine 2019-20 (6 mos+)(PF) (FLUARIX/FLULAVAL/FLUZONE QUAD) injection 0.5 mL  0.5 mL IntraMUSCular PRIOR TO DISCHARGE  vancomycin (VANCOCIN) 1250 mg in  ml infusion  1,250 mg IntraVENous Q16H  
 hydrALAZINE (APRESOLINE) 20 mg/mL injection 20 mg  20 mg IntraVENous Q6H PRN  
 morphine injection 4 mg  4 mg IntraVENous Q2H PRN  
 dextrose 10 % infusion 125-250 mL  125-250 mL IntraVENous PRN  
  milrinone (PRIMACOR) 20 MG/100 ML D5W infusion  0-0.75 mcg/kg/min IntraVENous TITRATE  DOBUTamine (DOBUTREX) 1,000 mg/250 mL (4,000 mcg/mL) infusion  0-10 mcg/kg/min IntraVENous TITRATE  nitroglycerin (Tridil) 200 mcg/ml infusion  0-200 mcg/min IntraVENous TITRATE  amiodarone (CORDARONE) 900 mg/250 ml D5W infusion  0.5 mg/min IntraVENous CONTINUOUS  
 EPINEPHrine (ADRENALIN) 10 mg in 0.9% sodium chloride 250 mL infusion  0-10 mcg/min IntraVENous TITRATE  insulin regular (NOVOLIN R, HUMULIN R) 100 Units in 0.9% sodium chloride 100 mL infusion  1-50 Units/hr IntraVENous TITRATE  niCARdipine (CARDENE) 25 mg in 0.9% sodium chloride 250 mL infusion  0-15 mg/hr IntraVENous TITRATE  
 NOREPINephrine (LEVOPHED) 32 mg in dextrose 5% 250 mL (128 mcg/mL) infusion  0.5-16 mcg/min IntraVENous TITRATE  PHENYLephrine (JUAN PABLO-SYNEPHRINE) 100 mg in 0.9% sodium chloride 250 mL infusion   mcg/min IntraVENous TITRATE  vasopressin (VASOSTRICT) 20 Units in 0.9% sodium chloride 100 mL infusion  0-0.04 Units/min IntraVENous TITRATE  ELECTROLYTE REPLACEMENT NOTE: Nurse to review Serum Potassium and Magnesuim levels and Initiate Electrolyte Replacement Protocol as needed  1 Each Other PRN No Known Allergies Objective: 
Vitals:   
Vitals:  
 11/20/19 0300 11/20/19 0400 11/20/19 0506 11/20/19 0600 BP:      
Pulse: 100 97 96 93 Resp: (!) 36 (!) 32 28 22 Temp:  98.4 °F (36.9 °C) SpO2: 94% 94% 92% 96% Weight:  91.7 kg (202 lb 3.2 oz) Height:      
 
Intake and Output: 
11/19 1901 - 11/20 0700 In: 836.9 [I.V.:836.9] Out: 371 [Urine:500; Drains:135] 11/18 0701 - 11/19 1900 In: 8931.5 [P.O.:600; I.V.:5947.5] Out: 9803 [KQARB:2455; Drains:1260] Physical Examination: 
 
General: On vent Neck:  Lines + Resp:  Rales B/L  
CV:  VADsounds  trace+ b/l LE edema GI:  Soft, NT, + Bowel sounds Neurologic:  SEDATED on vent :  Lizama w/ dark/ green urine [x]    High complexity decision making was performed 
[]    Patient is at high-risk of decompensation with multiple organ involvement Lab Data Personally Reviewed: I have reviewed all the pertinent labs, microbiology data and radiology studies during assessment. Recent Labs  
  11/20/19 0301 11/19/19 1858 11/19/19 0349 11/18/19 
2320 11/18/19 1851 11/18/19 
1432 11/18/19 
0414   --  141 141  --  141 135* K 3.9 3.8 3.9 4.3  --  3.0* 3.5   --  106 105  --  104 96* CO2 29  --  28 29  --  29 34* *  --  102* 106*  --  178* 109* BUN 22*  --  19 18  --  14 14 CREA 1.43*  --  1.30 1.26  --  1.26 1.35* CA 8.6  --  8.4* 8.3*  --  8.3* 8.6 MG 2.0 2.1 2.0 2.0  --  1.8 1.9 ALB 2.7*  --  2.7* 2.6*  --  2.2* 2.8* SGOT 60*  --  52* 50*  --  50* 52* ALT 9*  --  10* 12  --  15 9* INR 1.4*  --  1.2*  --  1.3* 1.3* 1.2* Recent Labs  
  11/20/19 
0301 11/19/19 1858 11/19/19 0349 11/18/19 1851 11/18/19 
1434 11/18/19 
0414  11/17/19 
2232 WBC 6.6  --  3.9*  --  4.0* 5.0  --   --   
HGB 8.0* 7.9* 7.3* 8.0* 8.8* 9.4*  --  9.4* HCT 25.9* 25.3* 23.1* 25.2* 27.9* 30.6*   < >  --   
PLT 92*  --  74*  --  88* 90*  --  79*  
 < > = values in this interval not displayed. No results found for: SDES Lab Results Component Value Date/Time Culture result: NO GROWTH 5 DAYS 11/12/2019 11:18 AM  
 Culture result: SERRATIA MARCESCENS (A) 10/31/2019 10:05 AM  
 Culture result: SERRATIA MARCESCENS (2ND COLONY TYPE/STRAIN) (A) 10/31/2019 10:05 AM  
 Culture result: ENTEROCOCCUS FAECALIS GROUP D (A) 10/31/2019 10:05 AM  
 Culture result: NO GROWTH 5 DAYS 10/25/2019 07:14 PM  
 
Recent Results (from the past 24 hour(s)) GLUCOSE, POC Collection Time: 11/19/19  7:06 AM  
Result Value Ref Range Glucose (POC) 120 (H) 65 - 100 mg/dL Performed by Lyn Arvizu Collection Time: 11/19/19  7:07 AM  
Result Value Ref Range Glucose 120 mg/dL Insulin order 0.7 units/hour Insulin adminstered 0.7 units/hour Multiplier 0.011 Low target 95 mg/dL High target 130 mg/dL D50 order 0.0 ml  
 D50 administered 0.00 ml Minutes until next  min Order initials Glenroy Reyes Administered initials Glenroy Reyes   
 GLSCOM Comments GLUCOSE, POC Collection Time: 11/19/19  9:11 AM  
Result Value Ref Range Glucose (POC) 108 (H) 65 - 100 mg/dL Performed by Farooq Dust Collection Time: 11/19/19  9:11 AM  
Result Value Ref Range Glucose 108 mg/dL Insulin order 0.5 units/hour Insulin adminstered 0.5 units/hour Multiplier 0.011 Low target 95 mg/dL High target 130 mg/dL D50 order 0.0 ml  
 D50 administered 0.00 ml Minutes until next  min Order initials elk Administered initials trisha SANCHEZOM Comments POC G3 - PUL Collection Time: 11/19/19 10:38 AM  
Result Value Ref Range FIO2 (POC) 50 % pH (POC) 7.477 (H) 7.35 - 7.45    
 pCO2 (POC) 35.6 35.0 - 45.0 MMHG  
 pO2 (POC) 158 (H) 80 - 100 MMHG  
 HCO3 (POC) 26.3 (H) 22 - 26 MMOL/L  
 sO2 (POC) 100 (H) 92 - 97 % Base excess (POC) 3 mmol/L Site DRAWN FROM ARTERIAL LINE Device: VENT Mode CPAP/SPON    
 PEEP/CPAP (POC) 5 cmH2O Pressure support 5 cmH2O Allens test (POC) N/A Specimen type (POC) ARTERIAL Total resp. rate 21 GLUCOSE, POC Collection Time: 11/19/19 11:15 AM  
Result Value Ref Range Glucose (POC) 120 (H) 65 - 100 mg/dL Performed by Farooq Dust Collection Time: 11/19/19 11:15 AM  
Result Value Ref Range Glucose 120 mg/dL Insulin order 0.7 units/hour Insulin adminstered 0.7 units/hour Multiplier 0.011 Low target 95 mg/dL High target 130 mg/dL D50 order 0.0 ml  
 D50 administered 0.00 ml Minutes until next  min Order initials elk Administered initials eltylor GLSCOM Comments PTT  Collection Time: 11/19/19  1:11 PM  
 Result Value Ref Range aPTT 26.3 22.1 - 32.0 sec  
 aPTT, therapeutic range     58.0 - 77.0 SECS  
GLUCOSE, POC Collection Time: 11/19/19  1:12 PM  
Result Value Ref Range Glucose (POC) 137 (H) 65 - 100 mg/dL Performed by Devang Eduardo Collection Time: 11/19/19  1:12 PM  
Result Value Ref Range Glucose 137 mg/dL Insulin order 0.8 units/hour Insulin adminstered 0.8 units/hour Multiplier 0.011 Low target 95 mg/dL High target 130 mg/dL D50 order 0.0 ml  
 D50 administered 0.00 ml Minutes until next BG 60 min Order initials elk Administered initials ekl GLSCOM Comments GLUCOSE, POC Collection Time: 11/19/19  2:14 PM  
Result Value Ref Range Glucose (POC) 136 (H) 65 - 100 mg/dL Performed by Devang Eduardo Collection Time: 11/19/19  2:14 PM  
Result Value Ref Range Glucose 136 mg/dL Insulin order 1.6 units/hour Insulin adminstered 1.6 units/hour Multiplier 0.021 Low target 95 mg/dL High target 130 mg/dL D50 order 0.0 ml  
 D50 administered 0.00 ml Minutes until next BG 60 min Order initials elk Administered initials ekl GLSCOM Comments GLUCOSE, POC Collection Time: 11/19/19  3:22 PM  
Result Value Ref Range Glucose (POC) 130 (H) 65 - 100 mg/dL Performed by Devang Eduardo Collection Time: 11/19/19  3:22 PM  
Result Value Ref Range Glucose 130 mg/dL Insulin order 1.5 units/hour Insulin adminstered 1.5 units/hour Multiplier 0.021 Low target 95 mg/dL High target 130 mg/dL D50 order 0.0 ml  
 D50 administered 0.00 ml Minutes until next BG 60 min Order initials elk Administered initials elk GLSCOM Comments PTT Collection Time: 11/19/19  3:58 PM  
Result Value Ref Range aPTT 41.2 (H) 22.1 - 32.0 sec  
 aPTT, therapeutic range     58.0 - 77.0 SECS  
GLUCOSE, POC  Collection Time: 11/19/19  4:28 PM  
 Result Value Ref Range Glucose (POC) 120 (H) 65 - 100 mg/dL Performed by [employer] Collection Time: 11/19/19  4:28 PM  
Result Value Ref Range Glucose 120 mg/dL Insulin order 1.3 units/hour Insulin adminstered 1.3 units/hour Multiplier 0.021 Low target 95 mg/dL High target 130 mg/dL D50 order 0.0 ml  
 D50 administered 0.00 ml Minutes until next BG 60 min Order initials elk Administered initials elk GLPHILIPP Comments GLUCOSE, POC Collection Time: 11/19/19  5:44 PM  
Result Value Ref Range Glucose (POC) 134 (H) 65 - 100 mg/dL Performed by [employer] Collection Time: 11/19/19  5:44 PM  
Result Value Ref Range Glucose 134 mg/dL Insulin order 2.3 units/hour Insulin adminstered 2.3 units/hour Multiplier 0.031 Low target 95 mg/dL High target 130 mg/dL D50 order 0.0 ml  
 D50 administered 0.00 ml Minutes until next BG 60 min Order initials elk Administered initials elk TANIKA Comments GLUCOSE, POC Collection Time: 11/19/19  6:46 PM  
Result Value Ref Range Glucose (POC) 152 (H) 65 - 100 mg/dL Performed by [employer] Collection Time: 11/19/19  6:46 PM  
Result Value Ref Range Glucose 152 mg/dL Insulin order 3.8 units/hour Insulin adminstered 3.8 units/hour Multiplier 0.041 Low target 95 mg/dL High target 130 mg/dL D50 order 0.0 ml  
 D50 administered 0.00 ml Minutes until next BG 60 min Order initials elk Administered initials elk GLPHILIPP Comments HGB & HCT Collection Time: 11/19/19  6:58 PM  
Result Value Ref Range HGB 7.9 (L) 12.1 - 17.0 g/dL HCT 25.3 (L) 36.6 - 50.3 % POTASSIUM Collection Time: 11/19/19  6:58 PM  
Result Value Ref Range Potassium 3.8 3.5 - 5.1 mmol/L MAGNESIUM Collection Time: 11/19/19  6:58 PM  
Result Value Ref Range Magnesium 2.1 1.6 - 2.4 mg/dL GLUCOSE, POC Collection Time: 11/19/19  8:18 PM  
Result Value Ref Range Glucose (POC) 142 (H) 65 - 100 mg/dL Performed by Heather Leonard Collection Time: 11/19/19  8:18 PM  
Result Value Ref Range Glucose 142 mg/dL Insulin order 4.2 units/hour Insulin adminstered 4.2 units/hour Multiplier 0.051 Low target 95 mg/dL High target 130 mg/dL D50 order 0.0 ml  
 D50 administered 0.00 ml Minutes until next BG 60 min Order initials sfm Administered initials sfm GLSCOM Comments GLUCOSE, POC Collection Time: 11/19/19  9:25 PM  
Result Value Ref Range Glucose (POC) 135 (H) 65 - 100 mg/dL Performed by Mennie Meter Collection Time: 11/19/19  9:25 PM  
Result Value Ref Range Glucose 135 mg/dL Insulin order 4.6 units/hour Insulin adminstered 4.6 units/hour Multiplier 0.061 Low target 95 mg/dL High target 130 mg/dL D50 order 0.0 ml  
 D50 administered 0.00 ml Minutes until next BG 60 min Order initials sfm Administered initials sfm GLSCOM Comments PTT Collection Time: 11/19/19  9:58 PM  
Result Value Ref Range aPTT 49.5 (H) 22.1 - 32.0 sec  
 aPTT, therapeutic range     58.0 - 77.0 SECS  
GLUCOSE, POC Collection Time: 11/19/19 10:59 PM  
Result Value Ref Range Glucose (POC) 85 65 - 100 mg/dL Performed by Mennie Meter Collection Time: 11/19/19 11:01 PM  
Result Value Ref Range Glucose 85 mg/dL Insulin order 1.2 units/hour Insulin adminstered 1.2 units/hour Multiplier 0.049 Low target 95 mg/dL High target 130 mg/dL D50 order 0.0 ml  
 D50 administered 0.00 ml Minutes until next BG 60 min Order initials sfm Administered initials sfm GLSCOM Comments GLUCOSE, POC Collection Time: 11/20/19 12:04 AM  
Result Value Ref Range Glucose (POC) 75 65 - 100 mg/dL  Performed by Michael Barron   
 Evon Huynh Collection Time: 11/20/19 12:06 AM  
Result Value Ref Range Glucose 75 mg/dL Insulin order 0.0 units/hour Insulin adminstered 0.0 units/hour Multiplier 0.039 Low target 95 mg/dL High target 130 mg/dL D50 order 10.0 ml  
 D50 administered 0.00 ml Minutes until next BG 15 min Order initials sfm Administered initials sfm GLSCOM Comments orange juice given to patient GLUCOSE, POC Collection Time: 11/20/19 12:23 AM  
Result Value Ref Range Glucose (POC) 87 65 - 100 mg/dL Performed by [employer] Collection Time: 11/20/19 12:24 AM  
Result Value Ref Range Glucose 87 mg/dL Insulin order 0.8 units/hour Insulin adminstered 0.0 units/hour Multiplier 0.029 Low target 95 mg/dL High target 130 mg/dL D50 order 0.0 ml  
 D50 administered 0.00 ml Minutes until next BG 60 min Order initials sfm Administered initials sfm GLSCOM Comments RN held 1 hour GLUCOSE, POC Collection Time: 11/20/19  1:25 AM  
Result Value Ref Range Glucose (POC) 141 (H) 65 - 100 mg/dL Performed by [employer] Collection Time: 11/20/19  1:25 AM  
Result Value Ref Range Glucose 141 mg/dL Insulin order 3.2 units/hour Insulin adminstered 3.2 units/hour Multiplier 0.039 Low target 95 mg/dL High target 130 mg/dL D50 order 0.0 ml  
 D50 administered 0.00 ml Minutes until next BG 60 min Order initials sfm Administered initials sfm GLSCOM Comments GLUCOSE, POC Collection Time: 11/20/19  2:44 AM  
Result Value Ref Range Glucose (POC) 131 (H) 65 - 100 mg/dL Performed by [employer] Collection Time: 11/20/19  2:45 AM  
Result Value Ref Range Glucose 131 mg/dL Insulin order 3.5 units/hour Insulin adminstered 3.5 units/hour Multiplier 0.049 Low target 95 mg/dL High target 130 mg/dL D50 order 0.0 ml  
 D50 administered 0.00 ml Minutes until next BG 60 min Order initials sfm Administered initials sfm GLSCOM Comments METABOLIC PANEL, COMPREHENSIVE Collection Time: 11/20/19  3:01 AM  
Result Value Ref Range Sodium 138 136 - 145 mmol/L Potassium 3.9 3.5 - 5.1 mmol/L Chloride 102 97 - 108 mmol/L  
 CO2 29 21 - 32 mmol/L Anion gap 7 5 - 15 mmol/L Glucose 110 (H) 65 - 100 mg/dL BUN 22 (H) 6 - 20 MG/DL Creatinine 1.43 (H) 0.70 - 1.30 MG/DL  
 BUN/Creatinine ratio 15 12 - 20 GFR est AA 59 (L) >60 ml/min/1.73m2 GFR est non-AA 49 (L) >60 ml/min/1.73m2 Calcium 8.6 8.5 - 10.1 MG/DL Bilirubin, total 5.1 (H) 0.2 - 1.0 MG/DL  
 ALT (SGPT) 9 (L) 12 - 78 U/L  
 AST (SGOT) 60 (H) 15 - 37 U/L Alk. phosphatase 72 45 - 117 U/L Protein, total 5.2 (L) 6.4 - 8.2 g/dL Albumin 2.7 (L) 3.5 - 5.0 g/dL Globulin 2.5 2.0 - 4.0 g/dL A-G Ratio 1.1 1.1 - 2.2 MAGNESIUM Collection Time: 11/20/19  3:01 AM  
Result Value Ref Range Magnesium 2.0 1.6 - 2.4 mg/dL LD Collection Time: 11/20/19  3:01 AM  
Result Value Ref Range  (H) 85 - 241 U/L  
LACTIC ACID Collection Time: 11/20/19  3:01 AM  
Result Value Ref Range Lactic acid 1.8 0.4 - 2.0 MMOL/L  
CBC W/O DIFF Collection Time: 11/20/19  3:01 AM  
Result Value Ref Range WBC 6.6 4.1 - 11.1 K/uL  
 RBC 2.62 (L) 4.10 - 5.70 M/uL HGB 8.0 (L) 12.1 - 17.0 g/dL HCT 25.9 (L) 36.6 - 50.3 % MCV 98.9 80.0 - 99.0 FL  
 MCH 30.5 26.0 - 34.0 PG  
 MCHC 30.9 30.0 - 36.5 g/dL RDW 20.0 (H) 11.5 - 14.5 % PLATELET 92 (L) 053 - 400 K/uL MPV 10.8 8.9 - 12.9 FL  
 NRBC 0.0 0  WBC ABSOLUTE NRBC 0.00 0.00 - 0.01 K/uL PTT Collection Time: 11/20/19  3:01 AM  
Result Value Ref Range aPTT 37.6 (H) 22.1 - 32.0 sec  
 aPTT, therapeutic range     58.0 - 77.0 SECS  
PROTHROMBIN TIME + INR Collection Time: 11/20/19  3:01 AM  
Result Value Ref Range INR 1.4 (H) 0.9 - 1.1 Prothrombin time 14.0 (H) 9.0 - 11.1 sec GLUCOSE, POC Collection Time: 11/20/19  3:57 AM  
Result Value Ref Range Glucose (POC) 246 (H) 65 - 100 mg/dL Performed by [id number], POC Collection Time: 11/20/19  3:58 AM  
Result Value Ref Range Glucose (POC) 97 65 - 100 mg/dL Performed by Milad Elam Collection Time: 11/20/19  3:58 AM  
Result Value Ref Range Glucose 97 mg/dL Insulin order 1.8 units/hour Insulin adminstered 1.8 units/hour Multiplier 0.049 Low target 95 mg/dL High target 130 mg/dL D50 order 0.0 ml  
 D50 administered 0.00 ml Minutes until next BG 60 min Order initials sfm Administered initials sfm GLSCOM Comments POC VENOUS BLOOD GAS Collection Time: 11/20/19  4:26 AM  
Result Value Ref Range Device: NASAL CANNULA Flow rate (POC) 6 L/M  
 pH, venous (POC) 7.442 (H) 7.32 - 7.42    
 pCO2, venous (POC) 40.3 (L) 41 - 51 MMHG  
 pO2, venous (POC) 32 25 - 40 mmHg HCO3, venous (POC) 27.5 23.0 - 28.0 MMOL/L  
 sO2, venous (POC) 64 (L) 65 - 88 % Base excess, venous (POC) 3 mmol/L Allens test (POC) N/A Site [facility] Specimen type (POC) MIXED VENOUS    
GLUCOSE, POC Collection Time: 11/20/19  5:06 AM  
Result Value Ref Range Glucose (POC) 96 65 - 100 mg/dL Performed by Danny Hein Collection Time: 11/20/19  5:06 AM  
Result Value Ref Range Glucose 96 mg/dL Insulin order 1.8 units/hour Insulin adminstered 1.8 units/hour Multiplier 0.049 Low target 95 mg/dL High target 130 mg/dL D50 order 0.0 ml  
 D50 administered 0.00 ml Minutes until next BG 60 min Order initials hm Administered initials hm GLSCOM Comments GLUCOSE, POC Collection Time: 11/20/19  6:08 AM  
Result Value Ref Range Glucose (POC) 94 65 - 100 mg/dL Performed by Hector Hernández  
 Collection Time: 11/20/19  6:09 AM  
Result Value Ref Range Glucose 94 mg/dL Insulin order 1.3 units/hour Insulin adminstered 1.3 units/hour Multiplier 0.039 Low target 95 mg/dL High target 130 mg/dL D50 order 0.0 ml  
 D50 administered 0.00 ml Minutes until next BG 60 min Order initials jmm Administered initials jmm GLSCOM Comments Total time spent with patient:  xxx   min. Care Plan discussed with: 
Patient Family RN Consulting Physician [facility],      
 
I have reviewed the flowsheets. Chart and Pertinent Notes have been reviewed. No change in PMH ,family and social history from Consult note.  
 
 
Edie Diamond MD

## 2019-11-20 NOTE — PROGRESS NOTES
Problem: Mobility Impaired (Adult and Pediatric) Goal: *Acute Goals and Plan of Care (Insert Text) Description FUNCTIONAL STATUS PRIOR TO ADMISSION: Patient was modified independent using a single point cane for functional mobility. Patient reports an increasingly sedentary lifestyle 2* fatigue and SOB/dyspnea. Retired (so is his wife). Patient reports x 3 falls within the last couple of weeks. Patient is wearing either nasal cannula or CPAP at night. LVAD work-up has been initiated. HOME SUPPORT PRIOR TO ADMISSION: The patient lived with his wife, but did not require physical assistance. Physical Therapy Goals: 
 
Re-evaluation completed 11/20/2019 and new goals formulated following LVAD implantation. 1.  Patient will move from supine to sit and sit to supine, scoot up and down and roll side to side in bed with minimal assistance/contact guard assist within 7 days. 2.  Patient will perform sit to/from stand with minimal assistance/contact guard assist within 7 days. 3.  Patient will ambulate 150 feet with least restrictive assistive device and minimal assistance/contact guard assist within 7 days. 4.  Patient will ascend/descend 4 stairs with handrail(s) with minimal assistance/contact guard assist within 7 days. 5.  Patient will perform cardiac exercises per protocol with supervision within 7 days. 6.  Patient will verbally and functionally recall 3/3 sternal precautions within 7 days. 7.  Patient will perform power exchange for power module to/from battery with moderate assistance  within 7 days. Initiated 10/27/2019; updated 11/15/2019 1. Patient will move from supine to sit and sit to supine, scoot up and down and roll side to side in bed with independence within 7 days. 2.  Patient will perform sit to/from stand with supervision/set-up within 7 days. 3.  Patient will ambulate 200 feet with least restrictive assistive device and supervision/set-up within 7 days. 4.  Patient will ascend/descend 4 stairs with  handrail(s) with supervision/set-up within 7 days for functional strengthening and community reintegration. Sarai Lira 5.  Patient will verbally and functionally recall 3/3 sternal precautions within 7 days in preparation for LVAD implantation. 6.  Patient will perform a mock power exchange for power module to/from battery with supervision/set-up within 7 days in preparation for LVAD implantation. 1.  Patient will move from supine to sit and sit to supine, scoot up and down and roll side to side in bed with independence within 7 days. 2.  Patient will perform sit to/from stand with supervision/set-up within 7 days. 3.  Patient will ambulate 150 feet with least restrictive assistive device and supervision/set-up within 7 days. 4.  Patient will ascend/descend 4 stairs with  handrail(s) with supervision/set-up within 7 days for functional strengthening and community reintegration. Sarai iLra 5.  Patient will verbally and functionally recall 3/3 sternal precautions within 7 days in preparation for LVAD implantation. 6.  Patient will perform a mock power exchange for power module to/from battery with supervision/set-up within 7 days in preparation for LVAD implantation. Outcome: Progressing Towards Goal 
 
PHYSICAL THERAPY REEVALUATION Patient: Aydee Diaz (75 y.o. male) Date: 11/20/2019 Primary Diagnosis: Acute decompensated heart failure ([facility] [address] [I50.9] Procedure(s) (LRB): REMOVAL TEMPORARY LEFT VENTRICULAR ASSIST DEVICE, IMPLANTATION OF LEFT VENTRICULAR ASSIST DEVICE PERMANENT (VAD), ECC, JACQUE, EPI AORTIC US BY DR Kary Greene. (Left) 2 Days Post-Op Precautions:  Fall, Sternal, LVAD - No chest compressions ASSESSMENT Based on the objective data described below, the patient presents with new L UE \"nerve\" pain, new sternal precautions, B LE edema, impaired cardiopulmonary tolerance, and impaired recall of LVAD parts/terminology. Focused on 1 part this session - \"\" however, patient with poor immediate and long-term recall. For fluid management, recommended ankle pumps, quad sets, and gluteal sets. Overall, patient required minimal assistance x 2 for sit <> stand transfers and light minimal assistance for static standing (~ 1 minute). Will continue to follow in order to progress patient's activity tolerance, LVAD management/alarm problem-solving, and functional independence. Current Level of Function Impacting Discharge (mobility/balance): Remains in CVICU, A x 2 for functional transfers at this time Functional Outcome Measure: The patient was unable to complete a TUG assessment this date which is indicative of a HIGH fall risk. Patient will benefit from skilled therapy intervention to address the above noted impairments. PLAN : 
Recommendations and Planned Interventions: bed mobility training, transfer training, gait training, therapeutic exercises, edema management/control, patient and family training/education, and therapeutic activities Frequency/Duration: Patient will be followed by physical therapy:  daily to address goals. Recommendation for discharge: (in order for the patient to meet his/her long term goals) To be determined: May benefit from rehab, working towards tolerating 3 hours of therapy per day This discharge recommendation: 
Has not yet been discussed the attending provider and/or case management Equipment recommendations for successful discharge (if) home: TBD SUBJECTIVE:  
Patient stated This arm is really giving me a fit.  OBJECTIVE DATA SUMMARY:  
HISTORY:   
Past Medical History:  
Diagnosis Date Degenerative disc disease, lumbar Heart failure ([address] High cholesterol Hypertension Paroxysmal atrial fibrillation ([address] 4/2/2019 Spinal stenosis Past Surgical History:  
Procedure Laterality Date COLONOSCOPY Left 11/11/2019 COLONOSCOPY at bedside performed by Bryon Perez MD at [address] HX CORONARY ARTERY BYPASS GRAFT    
 triple HX HERNIA REPAIR    
 HX IMPLANTABLE CARDIOVERTER DEFIBRILLATOR    
 ID CARDIOVERSION ELECTIVE ARRHYTHMIA EXTERNAL N/A 6/10/2019 EP CARDIOVERSION performed by Caridad Moralez MD at [address], [facility] CATH LAB  
 ID CARDIOVERSION ELECTIVE ARRHYTHMIA EXTERNAL N/A 6/18/2019 EP CARDIOVERSION performed by Zeke Maldonado MD at [address] [address] CATH LAB  
 ID INSJ/RPLCMT PERM DFB W/TRNSVNS LDS 1/DUAL [facility] N/A 6/21/2019 INSERT ICD BIV MULTI performed by Shona Anderson MD at [address] [address] CATH LAB  
 ID TCAT IMPL WRLS P-ART PRS SNR L-T HEMODYN MNTR N/A 9/18/2019 IMPLANT HEART FAILURE MONITORING DEVICE performed by Vicky Chamorro MD at [address], [facility] CATH LAB Hospital course since last seen and reason for reevaluation: LVAD Personal factors and/or comorbidities impacting plan of care: PMH Home Situation Home Environment: Private residence # Steps to Enter: 0 One/Two Story Residence: One story Living Alone: No 
Support Systems: Spouse/Significant Other/Partner Patient Expects to be Discharged to[de-identified] Unknown Current DME Used/Available at Home: Cane, straight, Walker, rolling, CPAP Tub or Shower Type: Shower EXAMINATION/PRESENTATION/DECISION MAKING:  
Critical Behavior: 
Neurologic State: Alert, Appropriate for age Orientation Level: Oriented X4 Cognition: Follows commands, Appropriate for age attention/concentration Safety/Judgement: Awareness of environment Hearing: Auditory Auditory Impairment: None Range Of Motion: 
AROM: Generally decreased, functional 
  
  
  
PROM: Generally decreased, functional 
  
  
  
Strength:   
Strength: Generally decreased, functional 
  
  
  
  
  
  
Tone & Sensation:  
Tone: Normal 
  
  
  
  
Sensation: Impaired(L UE \"nerve\" pain) Coordination: 
Coordination: Generally decreased, functional 
 Vision:  
  
Functional Mobility: 
Bed Mobility: 
NT- Received sitting up in chair Transfers: 
Sit to Stand: Assist x2;Minimum assistance; Additional time Stand to Sit: Assist x2;Minimum assistance; Additional time Balance:  
Sitting: Intact; Without support Standing: Impaired; With support Standing - Static: Fair;Constant support Standing - Dynamic : Poor Ambulation/Gait Training: 
Standing weight-shifts Functional Measure: 
Timed up and go: 
 
Timed Get Up And Go Test: (Unable to complete this date) < than 10 seconds=Normal  Greater then 13.5 seconds (in elderly)=Increased fall risk Ashlee GUALLPA Predicting the probability for falls in community dwelling older adults using the Timed Up and Go Test. Phys Ther. 2000;80:896-903. Activity Tolerance:  
Fair Please refer to the flowsheet for vital signs taken during this treatment. After treatment patient left in no apparent distress:  
Sitting in chair and Call bell within reach COMMUNICATION/EDUCATION:  
The patients plan of care was discussed with: Occupational Therapist, Registered Nurse, and Physician. Fall prevention education was provided and the patient/caregiver indicated understanding., Patient/family have participated as able in goal setting and plan of care. , and Patient/family agree to work toward stated goals and plan of care. Thank you for this referral. 
Jane Slater, PT, DPT Time Calculation: 20 mins

## 2019-11-20 NOTE — PROGRESS NOTES
Problem: Self Care Deficits Care Plan (Adult) Goal: *Acute Goals and Plan of Care (Insert Text) Description FUNCTIONAL STATUS PRIOR TO ADMISSION: Patient was modified independent using a single point cane for functional mobility. Patient able to shower and dress himself. However, patient required assistance for household management from his wife. HOME SUPPORT: The patient lived alone with wife to provide assistance. Occupational Therapy Goals: 
 
Goals reviewed and continued/modified as follows 11/20/19 s/p LVAD implantation 1. Patient will perform upper body dressing including LVAD switchovers with moderate assistance within 7 day(s). 2.  Patient will perform lower body dressing with minimal assistance within 7 day(s). 3.  Patient will perform grooming with supervision/setup within 7 day(s). 4.  Patient will perform toilet transfers supervision/setupmodified independence within 7 day(s). 5.  Patient will perform all aspects of toileting with minimal assistance within 7 day(s). 6.  Patient will participate in upper extremity therapeutic exercise/activities with supervision/set-up for 5 minutes within 7 day(s). 7.  Patient will utilize energy conservation techniques during functional activities with verbal cues within 7 day(s). 8. Patient will verbalize LVAD terminology with verbal cues within 7 day (s). Goals reviewed and continued/modified as follows 11/12/19 1. Patient will perform bathing with supervision/set-up within 7 day(s). 2.  Patient will perform lower body dressing with supervision/set-up within 7 day(s). 3.  Patient will perform grooming with modified independence within 7 day(s). 4.  Patient will perform toilet transfers with modified independence within 7 day(s). 5.  Patient will perform all aspects of toileting with supervision/set-up within 7 day(s).  
6.  Patient will participate in upper extremity therapeutic exercise/activities with supervision/set-up for 5 minutes within 7 day(s). 7.  Patient will utilize energy conservation techniques during functional activities with verbal cues within 7 day(s). 8. Patient will verbalize LVAD terminology with verbal cues within 7 day (s) in preparation for implant. Continue all goals 10/30/19 post re-eval for Impella removal  
Initiated 10/28/2019 1. Patient will perform bathing with supervision/set-up within 7 day(s). 2.  Patient will perform lower body dressing with supervision/set-up within 7 day(s). 3.  Patient will perform grooming with modified independence within 7 day(s). 4.  Patient will perform toilet transfers with modified independence within 7 day(s). 5.  Patient will perform all aspects of toileting with supervision/set-up within 7 day(s). 6.  Patient will participate in upper extremity therapeutic exercise/activities with supervision/set-up for 5 minutes within 7 day(s). 7.  Patient will utilize energy conservation techniques during functional activities with verbal cues within 7 day(s). Outcome: Progressing Towards Goal 
 OCCUPATIONAL THERAPY RE-EVALUATION Patient: Anabel Herrera (75 y.o. male) Date: 11/20/2019 Diagnosis: Acute decompensated [facility] [I50.9] <principal problem not specified> Procedure(s) (LRB): REMOVAL TEMPORARY LEFT VENTRICULAR ASSIST DEVICE, IMPLANTATION OF LEFT VENTRICULAR ASSIST DEVICE PERMANENT (VAD), ECC, JACQUE, EPI AORTIC US BY DR Taylor Ricci. (Left) 2 Days Post-Op Precautions: Fall, Sternal 
Chart, occupational therapy assessment, plan of care, and goals were reviewed. ASSESSMENT Based on the objective data described below, the patient presents with generalized weakness, impaired GM and FM coordination (especially in LUE with c/o dull achy pain and dusky in appearance), impaired standing balance, decreased endurance, decreased activity tolerance, decreased insight into deficits, and with newly acquired sternal precautions s/p LVAD implantation POD 2. Patient educated on 3/3 sternal precautions today and on initial LVAD terminology (), however patient requiring heavy reinforcement for recall. Patient also unable to tailor sit at this time due to BLE edema, chest tube, and neal catheter-will continue to progress in preparation for LB ADLs. Patient will continue to benefit from OT services to maximize patient safety and independence with ADL transfers and tasks. Current Level of Function Impacting Discharge (ADLs): MAX A LB ADLs Other factors to consider for discharge: LVAD HM III 
    
 
PLAN : 
Recommendations and Planned Interventions: self care training, functional mobility training, therapeutic exercise, balance training, therapeutic activities, endurance activities, patient education, home safety training, and family training/education Frequency/Duration: Patient will be followed by occupational therapy 5 times a week to address goals. Recommend with staff: OOB x 3 to chair; LVAD education Recommend next OT session: continued LVAD training with ADL tasks Recommendation for discharge: (in order for the patient to meet his/her long term goals) To be determined: pending functional progression This discharge recommendation: 
Has not yet been discussed the attending provider and/or case management Equipment recommendations for successful discharge (if) home: TBD SUBJECTIVE:  
Patient stated I know this is my heart pump; that's easy for me to say.  OBJECTIVE DATA SUMMARY:  
Hospital course since last seen and reason for reevaluation: LVAD HM III implantation 11/18 Cognitive/Behavioral Status: 
Neurologic State: Alert Orientation Level: Oriented X4 Cognition: Appropriate for age attention/concentration Perception: Appears intact Perseveration: No perseveration noted Safety/Judgement: Decreased insight into deficits Functional Mobility and Transfers for ADLs: 
  
 
Transfers: 
Sit to Stand: Assist x2;Minimum assistance; Additional time Balance: 
Sitting: Intact; Without support Standing: Impaired; With support Standing - Static: Fair;Constant support Standing - Dynamic : Poor ADL Assessment: 
Feeding: Setup;Supervision(infer 2* decreased LUE FM coordination) Oral Facial Hygiene/Grooming: Setup;Supervision(infer 2* decreased LUE FM coordination; seated) Bathing: Maximum assistance(infer A for BLE and periarea) Upper Body Dressing: Maximum assistance(infer A for LVAD management) Lower Body Dressing: Maximum assistance(infer 2* BLE edema) Toileting: Maximum assistance(infer A for clothing management and pericare) ADL Intervention and task modifications: 
  
 
  
 
  
 
  
 
  
 
Lower Body Dressing Assistance Socks: Total assistance (dependent) Cognitive Retraining Safety/Judgement: Decreased insight into deficits Functional Measure: 
Barthel Index: 
 
Bathin Bladder: 0 Bowels: 10 
Groomin Dressin Feedin Mobility: 0 Stairs: 0 Toilet Use: 5 Transfer (Bed to Chair and Back): 10 Total: 40/100 The Barthel ADL Index: Guidelines 1. The index should be used as a record of what a patient does, not as a record of what a patient could do. 2. The main aim is to establish degree of independence from any help, physical or verbal, however minor and for whatever reason. 3. The need for supervision renders the patient not independent. 4. A patient's performance should be established using the best available evidence. Asking the patient, friends/relatives and nurses are the usual sources, but direct observation and common sense are also important. However direct testing is not needed.  
5. Usually the patient's performance over the preceding 24-48 hours is important, but occasionally longer periods will be relevant. 6. Middle categories imply that the patient supplies over 50 per cent of the effort. 7. Use of aids to be independent is allowed. Moiz Arceo., Barthel, D.W. (0274). Functional evaluation: the Barthel Index. [address] [facility] (14)2. Alanis [place] gabriela CAROLINA Velez, Yarely Jarquin.[facility]., [place], [address] [place] [address] Ave (1999). Measuring the change indisability after inpatient rehabilitation; comparison of the responsiveness of the Barthel Index and Functional [place] Measure. Journal of Neurology, Neurosurgery, and Psychiatry, 66(4), 727-840. Robert Sun, N.J.A, SEVERO Lindsay, & Verena Bowers M.A. (2004.) Assessment of post-stroke quality of life in cost-effectiveness studies: The usefulness of the Barthel Index and the EuroQoL-5D. [facility], 13, 615-73 Activity Tolerance:  
Fair Please refer to the flowsheet for vital signs taken during this treatment. After treatment patient left in no apparent distress:  
Sitting in chair and Call bell within reach COMMUNICATION/COLLABORATION:  
The patients plan of care was discussed with: Physical Therapist and Registered Nurse Girish Cho Time Calculation: 24 mins

## 2019-11-20 NOTE — DIABETES MGMT
[facility] Cardiac Surgery Progress Note Recommendations/ Comments: Pt arrived to CVICU at 1415 on 11/18/19. Consider continuing insulin gtt for at least 48hrs post-op and eating 50% solid foods then, 
1) transition off gtt per Nando Askew Protocol 2) continue accu-checks and humalog correctional insulin ac & hs  
3) ADA/AHA diet as diet advanced 4) Pt was not on any medications PTA. May require basal insulin. Will need to determine closer to transition. Insulin gtt should not be stopped until after 1415 on 11/20/19 to complete 48hr post-op time frame. Pt could receive transition as early as 1215  if all criteria met per protocol. Currently on insulin gtt. At 1329 BG 95 mg/dL, rate 1.2 units/hr. Received 12.3 units in the past 24 hours. Chart reviewed on Sona Kiser. Patient is 71 y.o. male s/p Cardiac surgery -  LVAD, POD 2. Pt with no prior hx of DM A1c suggestive of new dx. A1c:  
Lab Results Component Value Date/Time Hemoglobin A1c 6.6 (H) 10/25/2019 02:56 PM  
 
 
 
Recent Glucose Results:  
Lab Results Component Value Date/Time  (H) 11/20/2019 03:01 AM  
 GLUCPOC 95 11/20/2019 01:29 PM  
 GLUCPOC 80 11/20/2019 01:06 PM  
 GLUCPOC 86 11/20/2019 12:04 PM  
  
 
Lab Results Component Value Date/Time Creatinine 1.43 (H) 11/20/2019 03:01 AM  
 
Estimated Creatinine Clearance: 56.7 mL/min (A) (based on SCr of 1.43 mg/dL (H)). Active Orders Diet DIET MECHANICAL SOFT No Conc. Sweets PO intake:  
No data found. Will continue to follow as needed. Thank you. Vince Andrade RN, CDE Time spent: 3 minutes

## 2019-11-20 NOTE — PROGRESS NOTES
NYHA class IV A/C systolic heart failure Low EF (10%) Inotrope dependent Malnutrition  
LVAD Work-up Epistaxis Hematuria Still having pain in arm Hgb a little low - getting pRBC Platelets reasonable PTT therapeutic Creatinine in the mid 1's Bilirubin up a bit Other LFTs look reasonable LDH and lactic acid reasonable Started on diuretics Visit Vitals BP 99/78 (BP 1 Location: Right arm) Pulse 92 Temp 98.9 °F (37.2 °C) Resp 29 Ht 6' 2\" (1.88 m) Wt 203 lb 11.2 oz (92.4 kg) SpO2 98% BMI 26.15 kg/m² LVAD Pump Speed (RPM): 5200 Pump Flow (LPM): 3.9 MAP: 76 
PI (Pulsitility Index): 3.5 Power: 3.6  Test: Yes 
Back Up  at Bedside & Labeled: Yes Power Module Test: Yes Driveline Site Care: No 
Driveline Dressing: Clean, Dry, and Intact Outpatient: No 
MAP in Therapeutic Range (Outpatient): No 
Testing Alarms Reviewed: Yes 
Back up SC speed: 5200 Back up Low Speed Limit: 4800 Emergency Equipment with Patient?: Yes Emergency procedures reviewed?: Yes Drive line site inspected?: No 
Drive line intergrity inspected?: Yes Drive line dressing changed?: No 
 
 
 
Intake/Output Summary (Last 24 hours) at 11/20/2019 1607 Last data filed at 11/20/2019 1500 Gross per 24 hour Intake 2504.95 ml Output 2570 ml Net -65.05 ml Visit Vitals BP 99/78 (BP 1 Location: Right arm) Pulse 92 Temp 98.9 °F (37.2 °C) Resp 29 Ht 6' 2\" (1.88 m) Wt 203 lb 11.2 oz (92.4 kg) SpO2 98% BMI 26.15 kg/m² Risk of morbidity and mortality - high Medical decision making - high complexity Total critical care time - 30 minutes (CPT 87117)

## 2019-11-20 NOTE — PROGRESS NOTES
[facility] Progress Note DOS:   11/20/2019 NAME:  Fahad Gibbs MRN:   [id number] REFERRING PROVIDER:  Dr. Tia Montana PRIMARY CARE PHYSICIAN: Jas Millard MD 
 
 
Chief Complaint:  
Cardiogenic Shock HPI: Luis Kiser is a 71y.o. year old pleasant white male with a history of HTN, HLD, JOSE, CAD s/p cardiac arrest VFib s/p CABG (2011) c/b sternal would infection and sternectomy, ischemic cardiomyopathy LVEF 15-20%, s/p ICD and with LBBB. He was admitted with acute on chronic systolic heart failure with massive volume overload > 20 lbs, in the setting of atrial fibrillation s/p failed DCCV and underwent DCCV and RHC on 6/18.  S/p BiVICD on 6/21/19 with Dr. Dre Porter. He was discharged home home on IV milrinone on 6/26/19. Herman Haynes has been followed closely by Dr. Tia Montana and the [facility]. 
  
Mr. Kiser was admitted for acute on chronic systolic heart failure. He underwent implantation of Impella 5.0 due to CS and has completed his LVAD evaluation. He meets criteria for implant of HM3 as DT. He was NYHA Class IV prior to implant of Impella 5.0, has LVEF < 15%, was intolerant of GDMT due to symptomatic hypotension and renal dysfunction. He remains dependent on temporary MCS support. RHC  revealed compensated hemodynamics on Impella. His renal function has improved dramatically with improvement in his hemodynamics. He is not a suitable transplant candidate due to single kidney, sternectomy, debility, and frailty. He was reviewed by [facility] and felt to be a high risk heart transplant candidate due to multiple co-morbidities as well as the afore mentioned conditions. He remained in the CCU and underwent a HM 3 implant as DT on 11/18. 24Hr Events Extubated L UE pain Moderate amount of CT drainage Procedure:  Procedure(s): REMOVAL TEMPORARY LEFT VENTRICULAR ASSIST DEVICE, IMPLANTATION OF LEFT VENTRICULAR ASSIST DEVICE PERMANENT (VAD), ECC, AJCQUE, EPI AORTIC US BY  WOOD.    
POD:  2 Impression / Plan:  
Heart Failure Status: NYHA Class IV INTERMACS Category 2 Chronic systolic heart failure due to ICM, NYHA Class IV, EF < 15%, cardiogenic shock on Impella 5.0 support S/p Impella removal and LVAD implant Goal CVP < 15 mmHG, CI > 2, MAP ~ 65-85 mmHg Cont milrinone 0.2mcg/kg/min for now Will wean Dobutamine for above parameters Off pressors Bumex today No BBrx while on dobutamine No AA/ARB/ARNI post op- will start as appropriate Anticoagulation for LVAD, INR goal 2-3 Hold ASA for platelets Cont coumadin tonight 2mg Cont Bivalrudin this evening at 1800 
  
Acute Respiratory Failure post op Resolved Adequate ventilation and oxygenation on current settings Pulmonary hygiene Wean NC for sats > 92% Post op Pain PRN morphine- will assess management daily Comfort measures Neurology consult for L arm pain CKD 3, atrophic left kidney Appreciate Nephrology assistance Assess diuretic dosing daily 2mg Bumex today Avoid nephrotoxins Trend labs  
  
CAD s/p CABG Intolerant of ASA due to thrombocytopenia No BB while on dobutamine Hold statin due to recent hepatic failure LHC performed 11/13 - low likelihood of benefit from revascularization  
  
Hx of VF arrest s/p BiV-ICD No recent shocks Keep K > 4 and Mg > 2  
   
PAF Currently in NSR, Paced Stop amio gtt, resume PO Amio 
  
Urinary retention, c/b serratia UTI and hematuria Appreciate Urology consult Cystoscopy performed 11/13 shows catheter induced cystitis  
  
Malnutrition Appreciate Nutritionist consult Prealbumin improved to 19 Resume Marinol on 11/21 Advance diet as tolerated when extubated  
  
COPD Appreciate Pulmonology assistance Continue nebs PRN   
PFTs complete 
  
Anemia Multifactorial, due to hemolysis + epistaxis + hematuria Intolerant of octreotide or doxycycline for AVM ppx due to prolonged QTc Transfuse for Hgb goal > 8.5 prior to OR 
 Hgb stable - monitor   
  
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  
  
Hx of sternal wound infection, sternectomy Dr. Bridget Daiely has reviewed CTs Sternum closed post op- monitor  
  
Vocal cord paralysis Improved Speech therapy to follow Debility Continue PT/OT  
  
Ppx Protonix PT/OT when able Post op antibiotics per routine Will resume Ancef for 2 weeks post op per Dr. Bridget Dailey for Impella prophylaxis Bowel regimen Advance diet today Dispo: 
Remain in CVI for now Patient seen and examined. Data and note reviewed. I have discussed and agree with the plans as noted. 71year old male with a history of CAD, s/p CAB,ICM, s/p LVAD as DT who remains on inotropic support. He complains of pain and numbness in his left arm from the elbow to his thumb, index, and middle finger. Of note, he had multiple attempts to place an arterial line in the OR. Appreciate input from neurology. Continue LVAD education, PT/OT. Thank you for allowing us to participate in your patient's care. Mounika Barroso MD, Vin Oneal Chief of Cardiology, [facility] Medical Director [address] [place] [address], [address] [facility], [address] [place] [address] Office [phone] Fax [phone] History: 
Past Medical History:  
Diagnosis Date  Degenerative disc disease, lumbar  Heart failure ([address]  High cholesterol  Hypertension  Paroxysmal atrial fibrillation ([address] 4/2/2019  Spinal stenosis Past Surgical History:  
Procedure Laterality Date  COLONOSCOPY Left 11/11/2019 COLONOSCOPY at bedside performed by Randall Hawthorne MD at [place] [address]  HX CORONARY ARTERY BYPASS GRAFT    
 triple  HX HERNIA REPAIR    
 HX IMPLANTABLE CARDIOVERTER DEFIBRILLATOR  AL CARDIOVERSION ELECTIVE ARRHYTHMIA EXTERNAL N/A 6/10/2019 EP CARDIOVERSION performed by Dottie Stoll MD at [address], [facility]  CATH LAB  GA CARDIOVERSION ELECTIVE ARRHYTHMIA EXTERNAL N/A 2019 EP CARDIOVERSION performed by Georgeanna Cooks, MD at [address], [facility]  CATH LAB  GA INSJ/RPLCMT PERM DFB W/TRNSVNS LDS 1/DUAL CHMBR N/A 2019 INSERT ICD BIV MULTI performed by Carl Chandler MD at [address], [facility]  CATH LAB  GA TCAT IMPL WRLS P-ART PRS SNR L-T HEMODYN MNTR N/A 2019 IMPLANT HEART FAILURE MONITORING DEVICE performed by Clara Jackson MD at [address], [facility]  CATH LAB Social History Socioeconomic History  Marital status:  Spouse name: Not on file  Number of children: Not on file  Years of education: Not on file  Highest education level: Not on file Occupational History  Not on file Social Needs  Financial resource strain: Not on file  Food insecurity:  
  Worry: Not on file Inability: Not on file  Transportation needs:  
  Medical: Not on file Non-medical: Not on file Tobacco Use  Smoking status: Former Smoker Last attempt to quit: 2010 Years since quittin.9  Smokeless tobacco: Never Used Substance and Sexual Activity  Alcohol use: Not Currently Comment: rarely  Drug use: Never  Sexual activity: Not on file Lifestyle  Physical activity:  
  Days per week: Not on file Minutes per session: Not on file  Stress: Not on file Relationships  Social connections:  
  Talks on phone: Not on file Gets together: Not on file Attends Worship service: Not on file Active member of club or organization: Not on file Attends meetings of clubs or organizations: Not on file Relationship status: Not on file  Intimate partner violence:  
  Fear of current or ex partner: Not on file Emotionally abused: Not on file Physically abused: Not on file Forced sexual activity: Not on file Other Topics Concern [address] Service Not Asked  Blood Transfusions Not Asked  Caffeine Concern Not Asked  Occupational Exposure Not Asked Williemae Red Hazards Not Asked  Sleep Concern Not Asked  Stress Concern Not Asked  Weight Concern Not Asked  Special Diet Not Asked  Back Care Not Asked  Exercise Not Asked  Bike Helmet Not Asked  Seat Belt Not Asked  Self-Exams Not Asked Social History Narrative  Not on file Family History Problem Relation Age of Onset  Lung Disease Mother  Hypertension Mother [facility] Arthritis-osteo Mother  Heart Disease Mother  Heart Disease Father  Diabetes Father Current Medications:  
Current Facility-Administered Medications Medication Dose Route Frequency Provider Last Rate Last Dose  amiodarone (CORDARONE) tablet 400 mg  400 mg Oral DAILY Levi, Shana B, NP   400 mg at 11/20/19 2446  pantoprazole (PROTONIX) tablet 40 mg  40 mg Oral ACB Levi, Shana B, NP   40 mg at 11/20/19 0919  
 insulin lispro (HUMALOG) injection   SubCUTAneous TIDAC Levi, Shana LIVIA, NP      
 insulin lispro (HUMALOG) injection   SubCUTAneous AC&HS Levi, Shana LIVIA, NP      
 insulin glargine (LANTUS) injection 1-50 Units  1-50 Units SubCUTAneous ONCE PRN Charmaine Sims Lipsydni BHAKTA NP      
 epoetin yvonne-epbx (RETACRIT) injection 20,000 Units  20,000 Units SubCUTAneous Q7D Logan Kincaid MD   20,000 Units at 11/19/19 0924  
 0.9% sodium chloride infusion 250 mL  250 mL IntraVENous PRN Thais Ward MD      
 ceFAZolin (ANCEF) 1 g in 0.9% sodium chloride (MBP/ADV) 50 mL  1 g IntraVENous Q8H Shana Sims NP      
 [START ON 11/21/2019] dronabinol (MARINOL) capsule 2.5 mg  2.5 mg Oral ACB&D Levi, Shana LIVIA NP      
 bivalirudin (ANGIOMAX) 250 mg in 0.9% sodium chloride (MBP/ADV) 50 mL  0.02-2.5 mg/kg/hr IntraVENous TITRATE Leopold Grates B, NP 3.2 mL/hr at 11/20/19 0748 0.1758 mg/kg/hr at 11/20/19 1198  lidocaine (LIDODERM) 5 % patch 1 Patch  1 Patch TransDERmal Q24H Shana Sims NP   1 Patch at 11/19/19 1934  
 0.9% sodium chloride infusion  9 mL/hr IntraVENous CONTINUOUS Mary Herrera NP 9 mL/hr at 11/20/19 0748 9 mL/hr at 11/20/19 0748  
 0.45% sodium chloride infusion  10 mL/hr IntraVENous CONTINUOUS Mary Herrera NP 10 mL/hr at 11/20/19 0749 10 mL/hr at 11/20/19 0749  
 sodium chloride (NS) flush 5-40 mL  5-40 mL IntraVENous Q8H Mary Herrera NP   10 mL at 11/20/19 0009  sodium chloride (NS) flush 5-40 mL  5-40 mL IntraVENous PRN Mary Herrera NP      
 acetaminophen (TYLENOL) tablet 650 mg  650 mg Oral Q6H PRN Mary Herrera NP      
 oxyCODONE IR (ROXICODONE) tablet 5 mg  5 mg Oral Q4H PRN Mary Herrera NP   5 mg at 11/19/19 2207  oxyCODONE IR (ROXICODONE) tablet 10 mg  10 mg Oral Q4H PRN Mary Herrera NP   10 mg at 11/20/19 9621  
 naloxone (NARCAN) injection 0.4 mg  0.4 mg IntraVENous PRN Mary Herrera NP      
 mupirocin (BACTROBAN) 2 % ointment   Both Nostrils BID Mray Herrera NP      
 [facility] injection 4 mg  4 mg IntraVENous Q4H PRN Mary Herrera NP   4 mg at 11/20/19 0717  
 albuterol-ipratropium (DUO-NEB) 2.5 MG-0.5 MG/3 ML  3 mL Nebulization Q6H PRN Mary Herrera NP      
 chlorhexidine (PERIDEX) 0.12 % mouthwash 10 mL  10 mL Oral BID Mary Herrera NP   10 mL at 11/20/19 0823  
 senna-docusate (PERICOLACE) 8.6-50 mg per tablet 1 Tab  1 Tab Oral DAILY Mary Herrera NP   1 Tab at 11/20/19 0926  
 polyethylene glycol (MIRALAX) packet 17 g  17 g Oral DAILY Mary Herrera NP   17 g at 11/20/19 2818  
 bisacodyl (DULCOLAX) tablet 5 mg  5 mg Oral DAILY PRN Mary Herrera NP      
 sucralfate (CARAFATE) tablet 1 g  1 g Oral AC&HS Jessica Zaragoza NP   1 g at 11/20/19 0717  
 0.9% sodium chloride infusion 250 mL  250 mL IntraVENous PRN Faraz Patiño MD      
  influenza vaccine 2019-20 (6 mos+)(PF) (FLUARIX/FLULAVAL/FLUZONE QUAD) injection 0.5 mL  0.5 mL IntraMUSCular PRIOR TO DISCHARGE Jasmina Altman MD      
 hydrALAZINE (APRESOLINE) 20 mg/mL injection 20 mg  20 mg IntraVENous Q6H PRN Shana Sims B, NP   20 mg at 11/19/19 0547  morphine injection 4 mg  4 mg IntraVENous Q2H PRN Lawrence Castaneda B, NP   4 mg at 11/20/19 4117  dextrose 10 % infusion 125-250 mL  125-250 mL IntraVENous PRN Aislinn Frye MD   Stopped at 11/18/19 0700  
 milrinone (PRIMACOR) 20 MG/100 ML D5W infusion  0-0.75 mcg/kg/min IntraVENous TITRATE Lawrence BHAKTA NP 5.3 mL/hr at 11/20/19 0749 0.2 mcg/kg/min at 11/20/19 0749  
 DOBUTamine (DOBUTREX) 1,000 mg/250 mL (4,000 mcg/mL) infusion  0-10 mcg/kg/min IntraVENous TITRATE Sarah Herrera NP 5.5 mL/hr at 11/20/19 0853 4 mcg/kg/min at 11/20/19 0853  
 nitroglycerin (Tridil) 200 mcg/ml infusion  0-200 mcg/min IntraVENous TITRATE Sarah Herrera NP      
 EPINEPHrine (ADRENALIN) 10 mg in 0.9% sodium chloride 250 mL infusion  0-10 mcg/min IntraVENous TITRATE Sarah Herrera NP   Stopped at 11/18/19 1615  
 insulin regular (NOVOLIN R, HUMULIN R) 100 Units in 0.9% sodium chloride 100 mL infusion  1-50 Units/hr IntraVENous TITRATE Sarah Herrera NP 3.8 mL/hr at 11/20/19 0956 3.8 Units/hr at 11/20/19 8649  niCARdipine (CARDENE) 25 mg in 0.9% sodium chloride 250 mL infusion  0-15 mg/hr IntraVENous TITRATE Sarah Herrera NP      
 NOREPINephrine (LEVOPHED) 32 mg in dextrose 5% 250 mL (128 mcg/mL) infusion  0.5-16 mcg/min IntraVENous TITRATE Sarah Herrera NP   Stopped at 11/18/19 1150  PHENYLephrine (JUAN PABLO-SYNEPHRINE) 100 mg in 0.9% sodium chloride 250 mL infusion   mcg/min IntraVENous TITRATE Sarah Herrera NP      
 vasopressin (VASOSTRICT) 20 Units in 0.9% sodium chloride 100 mL infusion  0-0.04 Units/min IntraVENous TITRATE Sarah Herrera NP   Stopped at 11/18/19 8009  ELECTROLYTE REPLACEMENT NOTE: Nurse to review Serum Potassium and Magnesuim levels and Initiate Electrolyte Replacement Protocol as needed  1 Each Other PRN Fab Samson NP Allergies: No Known Allergies ROS:   
Review of Systems Unable to perform ROS: Acuity of condition Physical Exam:  
Physical Exam 
Vitals signs and nursing note reviewed. Constitutional:   
   Appearance: He is well-developed and well-nourished. HENT:  
 
   Comments: intubatedNeck: Musculoskeletal: Neck supple. Cardiovascular:  
   Rate and Rhythm: Normal rate and regular rhythm. Heart sounds: Normal heart sounds. Comments: + VAD hum Pulmonary:  
   Comments: On vent Abdominal:  
   General: Bowel sounds are normal. There is no distension. Palpations: Abdomen is soft. Musculoskeletal:     
   General: Edema present. Comments: R > L Skin: 
   General: Skin is warm and dry. Neurological:  
   Comments: Sedated Vitals:  
Visit Vitals BP 97/84 Pulse 92 Temp 98.7 °F (37.1 °C) Resp 21 Ht 6' 2\" (1.88 m) Wt 203 lb 11.2 oz (92.4 kg) SpO2 98% BMI 26.15 kg/m² Temp (24hrs), Av.3 °F (36.8 °C), Min:97.7 °F (36.5 °C), Max:98.7 °F (37.1 °C) Hemodynamics: 
 CO: CO (l/min): 5.8 l/min CI: CI (l/min/m2): 2.7 l/min/m2 CVP: CVP (mmHg): 8 mmHg (19 1000) SVR: SVR (dyne*sec)/cm5: 1133 (dyne*sec)/cm5 (19 0800) PAP Systolic: PAP Systolic: 46 (74/26/79 2387) PAP Diastolic: PAP Diastolic: 20 (42/95/14 5886) PVR:   
 SV02: SVO2 (%): 58 % (19) SCV02: SCVO2 (%): 75 % (10/29/19 1900) Admission Weight: Last Weight Weight: 192 lb 10.9 oz (87.4 kg) Weight: 203 lb 11.2 oz (92.4 kg) Intake / Output / Drain: 
Last 24 hrs.:  
 
Intake/Output Summary (Last 24 hours) at 2019 1059 Last data filed at 2019 1000 Gross per 24 hour Intake 3588.32 ml Output 3130 ml Net 458.32 ml Drains:  
24h: 515 8h: 1000 Ventilator: 
Ventilator Volumes Vt Set (ml): 550 ml (11/19/19 0826) Vt Exhaled (Machine Breath) (ml): 639 ml (11/19/19 0826) Vt Spont (ml): 437 ml (11/19/19 1035) Ve Observed (l/min): 7.25 l/min (11/19/19 1035) Extubation Date / Time:  11/19/19 at 1030am 
 
Oxygen Therapy: 
Oxygen Therapy O2 Sat (%): 98 % (11/20/19 1000) Pulse via Oximetry: 87 beats per minute (11/20/19 1000) O2 Device: Nasal cannula (11/20/19 0800) O2 Flow Rate (L/min): 6 l/min (11/20/19 0800) FIO2 (%): 50 % (11/19/19 1035) VAD Data: LVAD (Heartmate) Pump Speed (RPM): 5200 Pump Flow (LPM): 3.9 PI (Pulsitility Index): 3.7 Power: 3.6 MAP: 83  Test: Yes 
Back Up  at Bedside & Labeled: Yes Power Module Test: Yes Driveline Site Care: No 
Driveline Dressing: Clean, Dry, and Intact Drive Line Exam: 
Stabilization device intact: yes Line inspected for damage: yes Appearance: no edema, erythema, drainage Stabilization Method: anchor to abd Recent Labs:  
Labs Latest Ref Rng & Units 11/20/2019 11/19/2019 11/19/2019 11/18/2019 11/18/2019 11/18/2019 11/18/2019 WBC 4.1 - 11.1 K/uL 6.6 - 3. 9(L) - - 4. 0(L) -  
RBC 4.10 - 5.70 M/uL 2.62(L) - 2.43(L) - - 2.91(L) - Hemoglobin 12.1 - 17.0 g/dL 8. 0(L) 7. 9(L) 7. 3(L) - 8. 0(L) 8.8(L) - Hematocrit 36.6 - 50.3 % 25. 9(L) 25. 3(L) 23. 1(L) - 25. 2(L) 27. 9(L) -  
MCV 80.0 - 99.0 FL 98.9 - 95.1 - - 95.9 - Platelets 877 - 189 K/uL 92(L) - 74(L) - - 88(L) - Lymphocytes 12 - 49 % - - 18 - - 7(L) - Monocytes 5 - 13 % - - 9 - - 7 - Eosinophils 0 - 7 % - - 0 - - 0 - Basophils 0 - 1 % - - 1 - - 0 - Albumin 3.5 - 5.0 g/dL 2. 7(L) - 2. 7(L) 2. 6(L) - - 2. 2(L) Calcium 8.5 - 10.1 MG/DL 8.6 - 8.4(L) 8. 3(L) - - 8. 3(L) SGOT 15 - 37 U/L 60(H) - 52(H) 50(H) - - 50(H) Glucose 65 - 100 mg/dL 110(H) - 102(H) 106(H) - - 178(H) BUN 6 - 20 MG/DL 22(H) - 19 18 - - 14 Creatinine 0.70 - 1.30 MG/DL 1.43(H) - 1.30 1.26 - - 1.26  
 Sodium 136 - 145 mmol/L 138 - 141 141 - - 141 Potassium 3.5 - 5.1 mmol/L 3.9 3.8 3.9 4.3 - - 3. 0(L) TSH 0.36 - 3.74 uIU/mL - - - - - - -  
PSA 0.01 - 4.0 ng/mL - - - - - - -  
LDH 85 - 241 U/L 496(H) - 491(H) - - - 630(H) CEA ng/mL - - - - - - - Some recent data might be hidden EKG:  
EKG Results Procedure 720 Value Units Date/Time EKG, 12 LEAD, INITIAL [[id number]] Collected:  11/19/19 0417 Order Status:  Completed Updated:  11/19/19 1224 Ventricular Rate 76 BPM   
  Atrial Rate 0 BPM   
  QRS Duration 156 ms   
  Q-T Interval 564 ms QTC Calculation (Bezet) 634 ms Calculated R Axis 81 degrees Calculated T Axis 120 degrees Diagnosis -- Electronic ventricular pacemaker Baseline artifact When compared with ECG of 13-NOV-2019 04:40, 
Vent. rate has increased BY   2 BPM 
Confirmed by Leatha Pugh M.D., Kelvin Walsh ([id number]) on 11/19/2019 12:23:52 PM 
  
 EKG, 12 LEAD, INITIAL [[id number]] Order Status:  Canceled EKG, 12 LEAD, INITIAL [[id number]] Order Status:  Canceled EKG, 12 LEAD, INITIAL [[id number]] Order Status:  Canceled EKG, 12 LEAD, INITIAL [[id number]] Order Status:  Canceled EKG, 12 LEAD, INITIAL [[id number]] Order Status:  Canceled EKG, 12 LEAD, INITIAL [[id number]] Collected:  11/13/19 0440 Order Status:  Completed Updated:  11/13/19 2200 Ventricular Rate 74 BPM   
  Atrial Rate 66 BPM   
  QRS Duration 166 ms   
  Q-T Interval 488 ms QTC Calculation (Bezet) 541 ms Calculated R Axis -15 degrees Calculated T Axis 157 degrees Diagnosis -- Electronic ventricular pacemaker When compared with ECG of 11-NOV-2019 04:49, No significant change was found Confirmed by Katherine Moore M.D., Antionette Baker ([id number]) on 11/13/2019 10:00:38 PM 
  
 EKG, 12 LEAD, INITIAL [[id number]] Order Status:  Canceled EKG, 12 LEAD, INITIAL [[id number]] Collected:  11/11/19 1219 Order Status:  Completed Updated:  11/11/19 7658 Ventricular Rate 74 BPM   
  Atrial Rate 39 BPM   
  QRS Duration 158 ms Q-T Interval 504 ms QTC Calculation (Bezet) 559 ms Calculated R Axis 13 degrees Calculated T Axis 175 degrees Diagnosis -- Electronic ventricular pacemaker When compared with ECG of 05-NOV-2019 10:44, 
Vent. rate has decreased BY   9 BPM 
Confirmed by He Wei M.D., Mk Allison ([id number]) on 11/11/2019 8:54:27 AM 
  
 EKG, 12 LEAD, INITIAL [[id number]] Order Status:  Canceled EKG, 12 LEAD, INITIAL [[id number]] Order Status:  Canceled EKG, 12 LEAD, INITIAL [[id number]] Order Status:  Canceled EKG, 12 LEAD, INITIAL [[id number]] Collected:  11/05/19 1044 Order Status:  Completed Updated:  11/05/19 1226 Ventricular Rate 83 BPM   
  Atrial Rate 83 BPM   
  P-R Interval 80 ms QRS Duration 162 ms Q-T Interval 502 ms QTC Calculation (Bezet) 589 ms Calculated R Axis 2 degrees Calculated T Axis -156 degrees Diagnosis -- Electronic ventricular pacemaker Marked T wave abnormality, consider  
anterolateral ischemia When compared with ECG of 02-NOV-2019 10:42, No significant change was found Confirmed by Sebastian Sun M.D., Dottie Slaughter ([id number]) on 11/5/2019 12:25:52 PM 
  
 EKG, 12 LEAD, INITIAL [[id number]] Collected:  11/02/19 1042 Order Status:  Completed Updated:  11/03/19 4965 Ventricular Rate 91 BPM   
  Atrial Rate 91 BPM   
  P-R Interval 108 ms QRS Duration 148 ms Q-T Interval 524 ms QTC Calculation (Bezet) 644 ms Calculated R Axis 5 degrees Calculated T Axis -152 degrees Diagnosis --  
  Sinus rhythm with short AL with occasional premature ventricular complexes  
and fusion complexes Nonspecific intraventricular block Marked T wave abnormality, consider anterolateral ischemia When compared with ECG of 26-OCT-2019 06:55, Sinus rhythm has replaced Electronic ventricular pacemaker Prolonged QT 
 Confirmed by Rupa Cid ([id number]) on 11/3/2019 6:35:23 AM 
  
 EKG, 12 LEAD, INITIAL [[id number]] Order Status:  Canceled EKG, 12 LEAD, INITIAL [[id number]] Collected:  10/26/19 5590 Order Status:  Completed Updated:  10/26/19 1807 Ventricular Rate 80 BPM   
  Atrial Rate 76 BPM   
  QRS Duration 174 ms Q-T Interval 462 ms QTC Calculation (Bezet) 532 ms Calculated R Axis 28 degrees Calculated T Axis 150 degrees Diagnosis -- Atrial flutter Left bundle branch block When compared with ECG of 25-OCT-2019 18:20, 
Electronic demand pacing is not noted Confirmed by Tina Morales ([id number]) on 10/26/2019 6:07:36 PM 
  
 EKG, 12 LEAD, INITIAL [[id number]] Collected:  10/25/19 1820 Order Status:  Completed Updated:  10/26/19 1749 Ventricular Rate 72 BPM   
  Atrial Rate 72 BPM   
  P-R Interval 86 ms QRS Duration 116 ms   
  Q-T Interval 506 ms QTC Calculation (Bezet) 554 ms Calculated P Axis 9 degrees Calculated R Axis -68 degrees Calculated T Axis 10 degrees Diagnosis --  
  undetermied rhythm possible atrial flutter Nonspecific intraventricular conduction delay Ventricular paced rhythm When compared with ECG of 26-JUN-2019 11:39, 
Significant changes have occurred Confirmed by Tina Morales ([id number]) on 10/26/2019 5:49:02 PM 
  
  
No specialty comments available. 10/25/19 OR ECHO JACQUE INTRAOP  11/18/2019 Narrative See Anesthesia Procedure note for procedure details. Signed by:   
  
Echo Results  (Last 48 hours) None CXR Results  (Last 48 hours)  
          
 11/20/19 0451  XR CHEST PORT Final result Impression:  IMPRESSION:  
1. Decreasing pulmonary edema 2. Decreasing atelectasis in the left lower lobe. 3. Mild gaseous distention of the stomach post NG tube removal.  
  
 Narrative:  EXAM: XR CHEST PORT INDICATION: post LVAD implant COMPARISON: 11/19/2019 FINDINGS: A portable AP radiograph of the chest was obtained at 0334 hours. The  
patient is on a cardiac monitor. ET tube and NG tube have been removed. Right IJ  
catheter and [place]-Russ catheter and ICD and LVAD remain in place. Mediastinal  
pleural drains are noted. There is mild gaseous distention of the stomach post NG tube removal  
   
The lungs demonstrate mild interstitial edema which has decreased. Left lower  
lobe atelectasis has decreased. There is mild atelectasis at the right lung  
base. 11/19/19 0447  XR CHEST PORT Final result Impression:  IMPRESSION: Stable chest x-ray appearance with mild interstitial pulmonary edema  
and patchy left basilar atelectasis again noted. Narrative:  EXAM: XR CHEST PORT INDICATION: post LVAD implant COMPARISON: 11/18/2019 FINDINGS: A portable AP radiograph of the chest was obtained at 0350 hours. Median sternotomy wires are again shown as are left axillary AICD and LVAD. The  
endotracheal tube, transesophageal tube and right IJ pulmonary arterial and  
central venous catheters appear unchanged in position. Mediastinal and bilateral  
chest tubes are again shown. No pneumothorax is demonstrated. There are patchy  
left basilar atelectasis and perhaps a small left pleural effusion. There is  
mild interstitial pulmonary edema again noted. Cardiac and mediastinal/hilar  
contours are stable. 11/18/19 1454  XR CHEST PORT Final result Impression:  IMPRESSION:  
1. No pneumothorax 2. Interstitial edema Narrative:  EXAM: XR CHEST PORT INDICATION: post LVAD implant COMPARISON: 11/18/2019 at [facility] FINDINGS: A portable AP radiograph of the chest was obtained at 1423 hours. The  
patient is on a cardiac monitor. The LVAD is noted. Patient status post median  
sternotomy. The Impella device has been removed. [place]-Russ catheter has its tip in the main pulmonary artery. Right IJ catheter overlies the SVC. ET tube is in satisfactory position. NG tube courses into the stomach. Mediastinal pleural drains are noted. No pneumothorax. There is mild interstitial edema. There is atelectasis in left  
lower lobe and a small left pleural effusion. TIERRA Bowden [address], [address] [place] [address] Office [phone] Fax [phone] 
24 hour VAD/HF Pager: [phone]

## 2019-11-21 NOTE — PROGRESS NOTES
0800 Bedside shift change report given to Mike Hilton (oncoming nurse) by [facility] (offgoing nurse). Report included the following information SBAR, MAR and Cardiac Rhythm paced. 8321 Begin to wean dobutamine 1000 Pt worked with physical therapy. Required max 2 assist, will continue to encourage OOB and independent ADLs 1445 Pt ate 50% of breakfast and lunch. Pt does not require glargine. Insulin gtt stopped. 1300 Dobutamine stopped. Issa Stephens changed, RN performed. Wife not able to come to bedside today. Pt quizzed on vocabulary and self testing. 2000 Bedside shift change report given to [facility] (oncoming nurse) by Mike Hilton (offgoing nurse). Report included the following information SBAR, MAR and Cardiac Rhythm Paced.

## 2019-11-21 NOTE — PROGRESS NOTES
Vascular: 
 
Left forearm pain following left radial art line placement and removal. Left hand cool without palpable radial or brachial pulses. Intact motor and sensory function. Will get arterial duplex to evaluate patency of brachial/radial/ulnar arteries.

## 2019-11-21 NOTE — PROGRESS NOTES
[facility] Progress Note DOS:   11/21/2019 NAME:  Milagro Holley MRN:   [id number] REFERRING PROVIDER:  Dr. Trace Schmidt PRIMARY CARE PHYSICIAN: Christian Calixto MD 
 
 
Chief Complaint:  
Cardiogenic Shock HPI: Sydni Kiser is a 71y.o. year old pleasant white male with a history of HTN, HLD, JOSE, CAD s/p cardiac arrest VFib s/p CABG (2011) c/b sternal would infection and sternectomy, ischemic cardiomyopathy LVEF 15-20%, s/p ICD and with LBBB. He was admitted with acute on chronic systolic heart failure with massive volume overload > 20 lbs, in the setting of atrial fibrillation s/p failed DCCV and underwent DCCV and RHC on 6/18.  S/p BiVICD on 6/21/19 with Dr. Pat Ricardo. He was discharged home home on IV milrinone on 6/26/19. [facility] has been followed closely by Dr. Trace Schmidt and the [facility]. 
  
Mr. Kiser was admitted for acute on chronic systolic heart failure. He underwent implantation of Impella 5.0 due to CS and has completed his LVAD evaluation. He meets criteria for implant of HM3 as DT. He was NYHA Class IV prior to implant of Impella 5.0, has LVEF < 15%, was intolerant of GDMT due to symptomatic hypotension and renal dysfunction. He remains dependent on temporary MCS support. RHC  revealed compensated hemodynamics on Impella. His renal function has improved dramatically with improvement in his hemodynamics. He is not a suitable transplant candidate due to single kidney, sternectomy, debility, and frailty. He was reviewed by [facility] and felt to be a high risk heart transplant candidate due to multiple co-morbidities as well as the afore mentioned conditions. He remained in the CCU and underwent a HM 3 implant as DT on 11/18. 24Hr Events Negative fluid balance Increased BLL edema L UE pain- not improved Improved CT drainage Procedure:  Procedure(s): REMOVAL TEMPORARY LEFT VENTRICULAR ASSIST DEVICE, IMPLANTATION OF LEFT VENTRICULAR ASSIST DEVICE PERMANENT (VAD), ECC, JACQUE, EPI AORTIC US BY DR Nugent Necessary. POD:  3 Impression / Plan:  
Heart Failure Status: NYHA Class IV INTERMACS Category 2 Chronic systolic heart failure due to ICM, NYHA Class IV, EF < 15%, cardiogenic shock on Impella 5.0 support S/p Impella removal and LVAD implant Goal CVP < 15 mmHG, CI > 2, MAP ~ 65-85 mmHg Cont milrinone 0.375mcg/kg/min for now Will wean Dobutamine for above parameters Bumex today No BBrx while on dobutamine No AA/ARB/ARNI post op- will start as appropriate Anticoagulation for LVAD, INR goal 2-3 Hold ASA for platelets Cont coumadin tonight 1mg Cont Bivalrudin until INR > 2 
  
Acute Respiratory Failure post op Resolved Pulmonary hygiene Wean NC for sats > 92% Post op Pain PRN morphine- will assess management daily Comfort measures L UE pain/weakness Appreciate neurology recommendations Pain unchanged Vascular consult today CKD 3, atrophic left kidney Appreciate Nephrology assistance Assess diuretic dosing daily 2mg Bumex today with albumin Avoid nephrotoxins Trend labs  
  
CAD s/p CABG Intolerant of ASA due to thrombocytopenia No BB while on dobutamine Hold statin due to recent hepatic failure LHC performed 11/13 - low likelihood of benefit from revascularization  
  
Hx of VF arrest s/p BiV-ICD No recent shocks Keep K > 4 and Mg > 2  
   
PAF Currently in NSR, Paced Cont PO Amio 
  
Urinary retention, c/b serratia UTI and hematuria Appreciate Urology consult Cystoscopy performed 11/13 shows catheter induced cystitis  
  
Malnutrition Appreciate Nutritionist consult Prealbumin improved to 19 Resume Marinol today Advance diet as tolerated when extubated  
  
COPD Appreciate Pulmonology assistance Continue nebs PRN   
PFTs complete 
  
Anemia Multifactorial, due to hemolysis + epistaxis + hematuria Intolerant of octreotide or doxycycline for AVM ppx due to prolonged QTc Transfuse for Hgb goal > 8.5 prior to OR Hgb stable - monitor   
  
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  
  
Hx of sternal wound infection, sternectomy Dr. Jojo Hernandez has reviewed CTs Sternum closed post op- monitor  
  
Vocal cord paralysis Improved Speech therapy to follow Debility Continue PT/OT  
  
Ppx Protonix PT/OT when able Post op antibiotics per routine Will resume Ancef for 2 weeks post op per Dr. Jojo Hernandez for Impella prophylaxis Bowel regimen Advance diet today Dispo: 
Remain in CVI for now Patient seen and examined. Data and note reviewed. I have discussed and agree with the plans as noted. 71year old male with a history of CAD, s/p CABG, ICM, CKD3 who underwent LVAD as DT. He developed significant dyspnea with weaning of IV dobutamine. Will increase IV milrinone and re-attempt to wean IV dobutamine. Left median neuropathy persists. Will consult vascular surgery. Continue PT/OT and LVAD education. Thank you for allowing us to participate in your patient's care. Mounika Turner MD, Deidra Clark Chief of Cardiology, [facility] Medical Director Calos Rueda [address], [address] Office [phone] Fax [phone] History: 
Past Medical History:  
Diagnosis Date  Degenerative disc disease, lumbar  Heart failure ([address]  High cholesterol  Hypertension  Paroxysmal atrial fibrillation ([address] 4/2/2019  Spinal stenosis Past Surgical History:  
Procedure Laterality Date  COLONOSCOPY Left 11/11/2019 COLONOSCOPY at bedside performed by Isha Su MD at [facility]  HX CORONARY ARTERY BYPASS GRAFT    
 triple  HX HERNIA REPAIR    
 HX IMPLANTABLE CARDIOVERTER DEFIBRILLATOR  DE CARDIOVERSION ELECTIVE ARRHYTHMIA EXTERNAL N/A 6/10/2019 EP CARDIOVERSION performed by Dottie Stoll MD at [address], [facility]/s  CATH LAB  MS CARDIOVERSION ELECTIVE ARRHYTHMIA EXTERNAL N/A 2019 EP CARDIOVERSION performed by Georgeanna Cooks, MD at [address], [facility]  CATH LAB  MS INSJ/RPLCMT PERM DFB W/TRNSVNS LDS 1/DUAL CHMBR N/A 2019 INSERT ICD BIV MULTI performed by Carl Chandler MD at [address], [facility]  CATH LAB  MS TCAT IMPL WRLS P-ART PRS SNR L-T HEMODYN MNTR N/A 2019 IMPLANT HEART FAILURE MONITORING DEVICE performed by Clara Jackson MD at [address], [facility]  CATH LAB Social History Socioeconomic History  Marital status:  Spouse name: Not on file  Number of children: Not on file  Years of education: Not on file  Highest education level: Not on file Occupational History  Not on file Social Needs  Financial resource strain: Not on file  Food insecurity:  
  Worry: Not on file Inability: Not on file  Transportation needs:  
  Medical: Not on file Non-medical: Not on file Tobacco Use  Smoking status: Former Smoker Last attempt to quit: 2010 Years since quittin.9  Smokeless tobacco: Never Used Substance and Sexual Activity  Alcohol use: Not Currently Comment: rarely  Drug use: Never  Sexual activity: Not on file Lifestyle  Physical activity:  
  Days per week: Not on file Minutes per session: Not on file  Stress: Not on file Relationships  Social connections:  
  Talks on phone: Not on file Gets together: Not on file Attends Latter day service: Not on file Active member of club or organization: Not on file Attends meetings of clubs or organizations: Not on file Relationship status: Not on file  Intimate partner violence:  
  Fear of current or ex partner: Not on file Emotionally abused: Not on file Physically abused: Not on file Forced sexual activity: Not on file Other Topics Concern [address] Service Not Asked  Blood Transfusions Not Asked  Caffeine Concern Not Asked  Occupational Exposure Not Asked Kye Peeling Hazards Not Asked  Sleep Concern Not Asked  Stress Concern Not Asked  Weight Concern Not Asked  Special Diet Not Asked  Back Care Not Asked  Exercise Not Asked  Bike Helmet Not Asked  Seat Belt Not Asked  Self-Exams Not Asked Social History Narrative  Not on file Family History Problem Relation Age of Onset  Lung Disease Mother  Hypertension Mother [facility] Arthritis-osteo Mother  Heart Disease Mother  Heart Disease Father  Diabetes Father Current Medications:  
Current Facility-Administered Medications Medication Dose Route Frequency Provider Last Rate Last Dose  warfarin (COUMADIN) tablet 1 mg  1 mg Oral ONCE Levi, Shana B, NP      
 amiodarone (CORDARONE) tablet 400 mg  400 mg Oral DAILY Levi, Shana B, NP   400 mg at 11/21/19 3345  pantoprazole (PROTONIX) tablet 40 mg  40 mg Oral ACB Levi, Shana B, NP   40 mg at 11/21/19 9469  insulin lispro (HUMALOG) injection   SubCUTAneous TIDAC Levi, Shana B, NP   2 Units at 11/21/19 0851  
 insulin lispro (HUMALOG) injection   SubCUTAneous AC&HS Zunildaavrghese BHAKTA, NP   Stopped at 11/20/19 2200  Warfarin MD/NP dosing   Other PRN Agapito, Asuncion Osorio MD      
 epoetin yvonne-epbx (RETACRIT) injection 20,000 Units  20,000 Units SubCUTAneous Q7D Logan Leger MD   20,000 Units at 11/19/19 0924  
 0.9% sodium chloride infusion 250 mL  250 mL IntraVENous PRN Myrna FRENCH MD      
 ceFAZolin (ANCEF) 1 g in 0.9% sodium chloride (MBP/ADV) 50 mL  1 g IntraVENous Q8H Levi, Shana B,  mL/hr at 11/21/19 0708 1 g at 11/21/19 1015  dronabinol (MARINOL) capsule 2.5 mg  2.5 mg Oral ACB&D Zunilda Curd B, NP   2.5 mg at 11/21/19 0688  bivalirudin (ANGIOMAX) 250 mg in 0.9% sodium chloride (MBP/ADV) 50 mL 0. 02-2.5 mg/kg/hr IntraVENous TITRATE Kris BHAKTA NP 4 mL/hr at 11/21/19 0817 0.2198 mg/kg/hr at 11/21/19 3703  lidocaine (LIDODERM) 5 % patch 1 Patch  1 Patch TransDERmal Q24H Shana Sims NP   1 Patch at 11/20/19 2000  
 0.9% sodium chloride infusion  9 mL/hr IntraVENous CONTINUOUS Andrés Herrera NP 9 mL/hr at 11/20/19 1952 9 mL/hr at 11/20/19 1952  
 0.45% sodium chloride infusion  10 mL/hr IntraVENous CONTINUOUS Andrés Herrera NP 10 mL/hr at 11/21/19 0813 10 mL/hr at 11/21/19 5105  sodium chloride (NS) flush 5-40 mL  5-40 mL IntraVENous Q8H Andrés Herrera NP   10 mL at 11/20/19 1410  
 sodium chloride (NS) flush 5-40 mL  5-40 mL IntraVENous PRN Andrés Herrera NP      
 acetaminophen (TYLENOL) tablet 650 mg  650 mg Oral Q6H PRN Andrés Herrera NP      
 oxyCODONE IR (ROXICODONE) tablet 5 mg  5 mg Oral Q4H PRN Andrés Herrera NP   5 mg at 11/19/19 2207  oxyCODONE IR (ROXICODONE) tablet 10 mg  10 mg Oral Q4H PRN Andrés Herrera NP   10 mg at 11/21/19 8905  naloxone (NARCAN) injection 0.4 mg  0.4 mg IntraVENous PRN Adnrés Herrera NP      
 mupirocin (BACTROBAN) 2 % ointment   Both Nostrils BID Andrés Herrera NP      
 ondansetron [facility]) injection 4 mg  4 mg IntraVENous Q4H PRN Andrés Herrera NP   4 mg at 11/20/19 2000  
 albuterol-ipratropium (DUO-NEB) 2.5 MG-0.5 MG/3 ML  3 mL Nebulization Q6H PRN Andrés Herrera NP      
 chlorhexidine (PERIDEX) 0.12 % mouthwash 10 mL  10 mL Oral BID Andrés Herrera NP   10 mL at 11/21/19 0839  
 senna-docusate (PERICOLACE) 8.6-50 mg per tablet 1 Tab  1 Tab Oral DAILY Andrés Herrera NP   1 Tab at 11/21/19 3743  polyethylene glycol (MIRALAX) packet 17 g  17 g Oral DAILY Andrés Herrera NP   17 g at 11/21/19 7126  bisacodyl (DULCOLAX) tablet 5 mg  5 mg Oral DAILY PRN Andrés Herrera NP      
 sucralfate (CARAFATE) tablet 1 g  1 g Oral AC&HS Charley Pacheco NP   1 g at 11/21/19 2808  0.9% sodium chloride infusion 250 mL  250 mL IntraVENous PRN Yuly Doty MD      
 influenza vaccine 2019-20 (6 mos+)(PF) (FLUARIX/FLULAVAL/FLUZONE QUAD) injection 0.5 mL  0.5 mL IntraMUSCular PRIOR TO DISCHARGE Aubree Altman MD      
 hydrALAZINE (APRESOLINE) 20 mg/mL injection 20 mg  20 mg IntraVENous Q6H PRN Shana Sims, NP   20 mg at 11/19/19 0547  morphine injection 4 mg  4 mg IntraVENous Q2H PRN Darshanadiana BHAKTA, NP   4 mg at 11/20/19 3922  dextrose 10 % infusion 125-250 mL  125-250 mL IntraVENous PRN Maryann Thomas MD   Stopped at 11/18/19 0700  
 milrinone (PRIMACOR) 20 MG/100 ML D5W infusion  0-0.75 mcg/kg/min IntraVENous TITRATE Darshana BHAKTA NP 10 mL/hr at 11/21/19 0812 0.375 mcg/kg/min at 11/21/19 0812  
 DOBUTamine (DOBUTREX) 1,000 mg/250 mL (4,000 mcg/mL) infusion  0-10 mcg/kg/min IntraVENous TITRATE Natacha Herrera NP 1.4 mL/hr at 11/21/19 0846 1 mcg/kg/min at 11/21/19 0846  
 nitroglycerin (Tridil) 200 mcg/ml infusion  0-200 mcg/min IntraVENous TITRATE Natacha Herrera NP      
 EPINEPHrine (ADRENALIN) 10 mg in 0.9% sodium chloride 250 mL infusion  0-10 mcg/min IntraVENous TITRATE Natacha Herrera NP   Stopped at 11/18/19 1615  
 insulin regular (NOVOLIN R, HUMULIN R) 100 Units in 0.9% sodium chloride 100 mL infusion  1-50 Units/hr IntraVENous TITRATE Fidela Paget, NP   Stopped at 11/21/19 4063  niCARdipine (CARDENE) 25 mg in 0.9% sodium chloride 250 mL infusion  0-15 mg/hr IntraVENous TITRATE Natacha Herrera NP      
 NOREPINephrine (LEVOPHED) 32 mg in dextrose 5% 250 mL (128 mcg/mL) infusion  0.5-16 mcg/min IntraVENous TITRATE Natacha Herrera NP   Stopped at 11/18/19 1150  PHENYLephrine (JUAN PABLO-SYNEPHRINE) 100 mg in 0.9% sodium chloride 250 mL infusion   mcg/min IntraVENous TITRATE Natacha Herrera NP      
 vasopressin (VASOSTRICT) 20 Units in 0.9% sodium chloride 100 mL infusion  0-0.04 Units/min IntraVENous TITRATE Elton Herrera NP   Stopped at 11/18/19 1527  
 ELECTROLYTE REPLACEMENT NOTE: Nurse to review Serum Potassium and Magnesuim levels and Initiate Electrolyte Replacement Protocol as needed  1 Each Other PRN Chloe Mc NP Allergies: No Known Allergies ROS:   
Review of Systems Constitutional: Positive for malaise/fatigue. Negative for chills and fever. HENT: Positive for hearing loss. Respiratory: Negative for cough, sputum production and shortness of breath. Cardiovascular: Positive for leg swelling. Negative for chest pain. Gastrointestinal: Negative for heartburn, nausea and vomiting. Musculoskeletal: Negative for myalgias. Skin: Negative. Neurological: Positive for weakness. Negative for dizziness and headaches. Endo/Heme/Allergies: Negative. Psychiatric/Behavioral: Negative. Physical Exam:  
Physical Exam 
Vitals signs and nursing note reviewed. Constitutional:   
   Appearance: He is well-developed and well-nourished. HENT:  
   Head: Normocephalic. Eyes:  
   Pupils: Pupils are equal, round, and reactive to light. Neck: Musculoskeletal: Normal range of motion and neck supple. Vascular: No JVD. Cardiovascular:  
   Rate and Rhythm: Normal rate and regular rhythm. Heart sounds: Normal heart sounds. Comments: + VAD hum Pulmonary:  
   Effort: Pulmonary effort is normal.  
   Breath sounds: Normal breath sounds. Abdominal:  
   General: Bowel sounds are normal. There is no distension. Palpations: Abdomen is soft. Musculoskeletal: Normal range of motion. General: Edema present. Comments: R > L Skin: 
   General: Skin is warm and dry. Neurological:  
   Mental Status: He is alert and oriented to person, place, and time. Vitals:  
Visit Vitals BP 93/71 (BP 1 Location: Right arm, BP Patient Position: At rest) Pulse (!) 101 Temp 99.2 °F (37.3 °C) Resp 28 Ht 6' 2\" (1.88 m) Wt 203 lb (92.1 kg) SpO2 91% BMI 26.06 kg/m² Temp (24hrs), Av °F (37.2 °C), Min:98.4 °F (36.9 °C), Max:99.4 °F (37.4 °C) Hemodynamics: 
 CO: CO (l/min): 5.6 l/min CI: CI (l/min/m2): 2.6 l/min/m2 CVP: CVP (mmHg): 8 mmHg (19 1000) SVR: SVR (dyne*sec)/cm5: 678 (dyne*sec)/cm5 (19 0800) PAP Systolic: PAP Systolic: 44 ( 9839) PAP Diastolic: PAP Diastolic: 25 ( 2346) PVR:   
 SV02: SVO2 (%): 54 % (19 08) SCV02: SCVO2 (%): 75 % (10/29/19 1900) Admission Weight: Last Weight Weight: 192 lb 10.9 oz (87.4 kg) Weight: 203 lb (92.1 kg) Intake / Output / Drain: 
Last 24 hrs.:  
 
Intake/Output Summary (Last 24 hours) at 2019 1110 Last data filed at 2019 1000 Gross per 24 hour Intake 1466.48 ml Output 1720 ml Net -253.52 ml Drains:  
24h: 370 8h: 80 
 
Extubation Date / Time:  19 at 1030am 
 
Oxygen Therapy: 
Oxygen Therapy O2 Sat (%): 91 % (19 0700) Pulse via Oximetry: 99 beats per minute (19 0900) O2 Device: Nasal cannula (19 08) O2 Flow Rate (L/min): 6 l/min (19 0800) FIO2 (%): 50 % (19 1035) VAD Data: LVAD (Heartmate) Pump Speed (RPM): 5200 Pump Flow (LPM): 3.6 PI (Pulsitility Index): 4.9 Power: 3.6 MAP: 83  Test: Yes 
Back Up  at Bedside & Labeled: Yes Power Module Test: Yes Driveline Site Care: No 
Driveline Dressing: New drainage Drive Line Exam: 
Stabilization device intact: yes Line inspected for damage: yes Appearance: no edema, erythema, drainage Stabilization Method: anchor to abd Recent Labs:  
Labs Latest Ref Rng & Units 2019 WBC 4.1 - 11.1 K/uL 7.0 6.6 - 3. 9(L) - - 4. 0(L) RBC 4.10 - 5.70 M/uL 2.80(L) 2.62(L) - 2.43(L) - - 2.91(L) Hemoglobin 12.1 - 17.0 g/dL 8.6(L) 8.0(L) 7. 9(L) 7. 3(L) - 8. 0(L) 8.8(L) Hematocrit 36.6 - 50.3 % 27. 9(L) 25. 9(L) 25. 3(L) 23. 1(L) - 25. 2(L) 27. 9(L) MCV 80.0 - 99.0 FL 99. 6(H) 98.9 - 95.1 - - 95.9 Platelets 906 - 940 K/uL 105(L) 92(L) - 74(L) - - 88(L) Lymphocytes 12 - 49 % - - - 18 - - 7(L) Monocytes 5 - 13 % - - - 9 - - 7 Eosinophils 0 - 7 % - - - 0 - - 0 Basophils 0 - 1 % - - - 1 - - 0 Albumin 3.5 - 5.0 g/dL 2. 6(L) 2. 7(L) - 2. 7(L) 2. 6(L) - -  
Calcium 8.5 - 10.1 MG/DL 8.6 8.6 - 8.4(L) 8. 3(L) - -  
SGOT 15 - 37 U/L 40(H) 60(H) - 52(H) 50(H) - -  
Glucose 65 - 100 mg/dL 95 110(H) - 102(H) 106(H) - -  
BUN 6 - 20 MG/DL 23(H) 22(H) - 19 18 - - Creatinine 0.70 - 1.30 MG/DL 1.36(H) 1.43(H) - 1.30 1.26 - - Sodium 136 - 145 mmol/L 133(L) 138 - 141 141 - - Potassium 3.5 - 5.1 mmol/L 4.0 3.9 3.8 3.9 4.3 - -  
TSH 0.36 - 3.74 uIU/mL - - - - - - -  
PSA 0.01 - 4.0 ng/mL - - - - - - -  
LDH 85 - 241 U/L 460(H) 496(H) - 491(H) - - -  
CEA ng/mL - - - - - - - Some recent data might be hidden EKG:  
EKG Results Procedure 720 Value Units Date/Time EKG, 12 LEAD, INITIAL [[id number]] Collected:  11/19/19 0417 Order Status:  Completed Updated:  11/19/19 1224 Ventricular Rate 76 BPM   
  Atrial Rate 0 BPM   
  QRS Duration 156 ms   
  Q-T Interval 564 ms QTC Calculation (Bezet) 634 ms Calculated R Axis 81 degrees Calculated T Axis 120 degrees Diagnosis -- Electronic ventricular pacemaker Baseline artifact When compared with ECG of 13-NOV-2019 04:40, 
Vent. rate has increased BY   2 BPM 
Confirmed by Buffy Marquez M.D., Corinna Conner ([id number]) on 11/19/2019 12:23:52 PM 
  
 EKG, 12 LEAD, INITIAL [[id number]] Order Status:  Canceled EKG, 12 LEAD, INITIAL [[id number]] Order Status:  Canceled EKG, 12 LEAD, INITIAL [[id number]] Order Status:  Canceled EKG, 12 LEAD, INITIAL [[id number]] Order Status:  Canceled EKG, 12 LEAD, INITIAL [[id number]] Order Status:  Canceled EKG, 12 LEAD, INITIAL [[id number]] Collected:  11/13/19 0440 Order Status:  Completed Updated:  11/13/19 2200 Ventricular Rate 74 BPM   
  Atrial Rate 66 BPM   
  QRS Duration 166 ms   
  Q-T Interval 488 ms QTC Calculation (Bezet) 541 ms Calculated R Axis -15 degrees Calculated T Axis 157 degrees Diagnosis -- Electronic ventricular pacemaker When compared with ECG of 11-NOV-2019 04:49, No significant change was found Confirmed by Eulalia Barney M.D., Shabana Arciniega ([id number]) on 11/13/2019 10:00:38 PM 
  
 EKG, 12 LEAD, INITIAL [[id number]] Order Status:  Canceled EKG, 12 LEAD, INITIAL [[id number]] Collected:  11/11/19 0449 Order Status:  Completed Updated:  11/11/19 3992 Ventricular Rate 74 BPM   
  Atrial Rate 39 BPM   
  QRS Duration 158 ms Q-T Interval 504 ms QTC Calculation (Bezet) 559 ms Calculated R Axis 13 degrees Calculated T Axis 175 degrees Diagnosis -- Electronic ventricular pacemaker When compared with ECG of 05-NOV-2019 10:44, 
Vent. rate has decreased BY   9 BPM 
Confirmed by Eulalia Barney M.D., Shabana Arciniega ([id number]) on 11/11/2019 8:54:27 AM 
  
 EKG, 12 LEAD, INITIAL [[id number]] Order Status:  Canceled EKG, 12 LEAD, INITIAL [[id number]] Order Status:  Canceled EKG, 12 LEAD, INITIAL [[id number]] Order Status:  Canceled EKG, 12 LEAD, INITIAL [[id number]] Collected:  11/05/19 1044 Order Status:  Completed Updated:  11/05/19 1226 Ventricular Rate 83 BPM   
  Atrial Rate 83 BPM   
  P-R Interval 80 ms QRS Duration 162 ms Q-T Interval 502 ms QTC Calculation (Bezet) 589 ms Calculated R Axis 2 degrees Calculated T Axis -156 degrees Diagnosis -- Electronic ventricular pacemaker Marked T wave abnormality, consider  
anterolateral ischemia When compared with ECG of 02-NOV-2019 10:42, No significant change was found Confirmed by Rebekah Foster M.D., Lorenzo Mendenhall ([id number]) on 11/5/2019 12:25:52 PM 
  
 EKG, 12 LEAD, INITIAL [[id number]] Collected:  11/02/19 1042 Order Status:  Completed Updated:  11/03/19 4302 Ventricular Rate 91 BPM   
  Atrial Rate 91 BPM   
  P-R Interval 108 ms QRS Duration 148 ms Q-T Interval 524 ms QTC Calculation (Bezet) 644 ms Calculated R Axis 5 degrees Calculated T Axis -152 degrees Diagnosis --  
  Sinus rhythm with short ND with occasional premature ventricular complexes  
and fusion complexes Nonspecific intraventricular block Marked T wave abnormality, consider anterolateral ischemia When compared with ECG of 26-OCT-2019 06:55, Sinus rhythm has replaced Electronic ventricular pacemaker Prolonged QT Confirmed by Jennifer Grace ([id number]) on 11/3/2019 6:35:23 AM 
  
 EKG, 12 LEAD, INITIAL [[id number]] Order Status:  Canceled EKG, 12 LEAD, INITIAL [[id number]] Collected:  10/26/19 9000 Order Status:  Completed Updated:  10/26/19 1807 Ventricular Rate 80 BPM   
  Atrial Rate 76 BPM   
  QRS Duration 174 ms Q-T Interval 462 ms QTC Calculation (Bezet) 532 ms Calculated R Axis 28 degrees Calculated T Axis 150 degrees Diagnosis -- Atrial flutter Left bundle branch block When compared with ECG of 25-OCT-2019 18:20, 
Electronic demand pacing is not noted Confirmed by Nat Rosa ([id number]) on 10/26/2019 6:07:36 PM 
  
 EKG, 12 LEAD, INITIAL [[id number]] Collected:  10/25/19 1820 Order Status:  Completed Updated:  10/26/19 1749 Ventricular Rate 72 BPM   
  Atrial Rate 72 BPM   
  P-R Interval 86 ms QRS Duration 116 ms   
  Q-T Interval 506 ms QTC Calculation (Bezet) 554 ms Calculated P Axis 9 degrees Calculated R Axis -68 degrees Calculated T Axis 10 degrees Diagnosis --  
  undetermied rhythm possible atrial flutter Nonspecific intraventricular conduction delay Ventricular paced rhythm When compared with ECG of 26-JUN-2019 11:39, 
Significant changes have occurred Confirmed by Gerhard Dailey ([id number]) on 10/26/2019 5:49:02 PM 
  
  
No specialty comments available. 10/25/19 OR ECHO JACQUE INTRAOP  11/18/2019 Narrative See Anesthesia Procedure note for procedure details. Signed by:   
  
Echo Results  (Last 48 hours) None CXR Results  (Last 48 hours)  
          
 11/21/19 0443  XR CHEST PORT Final result Impression:  IMPRESSION: Continued evidence of congestive cardiac failure/pulmonary edema. Narrative:  Portable chest single view dated 11/21/2019 Comparison chest dated 11/20/2019 History is post LVAD) graft a single frontal view of the chest was obtained. The  
cardiac silhouette continues to be enlarged. There continues to be evidence of  
congestive change/pulmonary edema. Veiling opacity is noted at both lung bases. This is compatible with the presence of bilateral pleural effusions. The  
[place]Russ catheter and the right IJ catheter are again noted and are unchanged  
in position. The previously described gaseous distention of the stomach is not  
evident on the current examination. 11/20/19 0451  XR CHEST PORT Final result Impression:  IMPRESSION:  
1. Decreasing pulmonary edema 2. Decreasing atelectasis in the left lower lobe. 3. Mild gaseous distention of the stomach post NG tube removal.  
  
 Narrative:  EXAM: XR CHEST PORT INDICATION: post LVAD implant COMPARISON: 11/19/2019 FINDINGS: A portable AP radiograph of the chest was obtained at 0334 hours. The  
patient is on a cardiac monitor. ET tube and NG tube have been removed. Right IJ  
catheter and [place]-Russ catheter and ICD and LVAD remain in place. Mediastinal  
pleural drains are noted. There is mild gaseous distention of the stomach post NG tube removal  
   
The lungs demonstrate mild interstitial edema which has decreased. Left lower lobe atelectasis has decreased. There is mild atelectasis at the right lung  
base. TIERRA Gaona [address], [address] [place] [address] Office [phone] Fax [phone] 
24 hour VAD/HF Pager: [phone]

## 2019-11-21 NOTE — PROGRESS NOTES
2000: Received report from [facility]. Christelle dual verified. 0000: Dobutamine decreased from 2 mcg/kg/min to 1 mcg/kg/min, CI >2. 
 
0208: Pt c/o of increased SOB, obvious increased work of breathing. CI remains >2, Dobutamine increased to 2 mcg/kg/min. 
 
0400: Labs sent, CXR at bedside. 0630: Dr. Candis Carlos at bedside, updated on pt status overnight including Dobutamine wean. Orders received to increase milrinone from 0.2 mcg/kg/min to 0.375 mcg/kg/min. Orders to perform doppler MAPS Q2 instead of Q1 as well.  
 
0500: Scant amount of purulent drainage of note on LVAD dressing site, will pass along to day shift RN to investigate further with daily dressing change (with pt's wife). 0730: Verlene Signs, PAat bedside orders received to remove sternal dressing today, leave open to air. 0800: Bedside and Verbal shift change report given to Marleen Crawley RN  (oncoming nurse) by Apartment List (offgoing nurse). Report included the following information SBAR, Kardex, OR Summary, Procedure Summary, Intake/Output, MAR, Recent Results, Cardiac Rhythm PACED  and Alarm Parameters .

## 2019-11-21 NOTE — PROGRESS NOTES
ID Progress Note 
2019 Subjective:  
 
Remains off vent, looks good Objective: Antibiotics: 1. Levaquin 2. Rifampin 3. Fluconazole 4. Vancomycin 5. Cefazolin Vitals:  
Visit Vitals BP 93/71 (BP 1 Location: Right arm, BP Patient Position: At rest) Comment: map 66 Pulse 87 Temp 99.2 °F (37.3 °C) Resp 17 Ht 6' 2\" (1.88 m) Wt 92.1 kg (203 lb) SpO2 96% BMI 26.06 kg/m² Tmax:  Temp (24hrs), Av.1 °F (37.3 °C), Min:98.4 °F (36.9 °C), Max:99.4 °F (37.4 °C) Exam:  Lungs:  clear to auscultation bilaterally Heart:  regular rate and rhythm Abdomen:  soft, non-tender. Bowel sounds normal. No masses,  no organomegaly Urine is clearing up Labs:     
Recent Labs  
  19 
0413 19 
0301 19 
1858 19 
0349 19 
2320 19 
1851 WBC 7.0 6.6  --  3.9*  --   --   
HGB 8.6* 8.0* 7.9* 7.3*  --  8.0*  
* 92*  --  74*  --   --   
BUN 23* 22*  --   18  --   
CREA 1.36* 1.43*  --  1.30 1.26  --   
SGOT 40* 60*  --  52* 50*  --   
AP 89 72  --  66 67  --   
TBILI 5.5* 5.1*  --  3.4* 3.7*  --   
 
 
Cultures: No results found for: SDES Lab Results Component Value Date/Time Culture result: NO GROWTH 5 DAYS 2019 11:18 AM  
 Culture result: SERRATIA MARCESCENS (A) 10/31/2019 10:05 AM  
 Culture result: SERRATIA MARCESCENS (2ND COLONY TYPE/STRAIN) (A) 10/31/2019 10:05 AM  
 Culture result: ENTEROCOCCUS FAECALIS GROUP D (A) 10/31/2019 10:05 AM  
 
 
Radiology:  
 
Line/Insert Date:        
 
Assessment:  
 
1. UTI--Serratia (2 biotypes) from culture and small amount of Enterococcus 2. CHF/cardiomyopathy Objective: 1. Continue current therapy 2. LVAD Ewelina Rock MD

## 2019-11-21 NOTE — PROGRESS NOTES
Cardiac Surgery Care Coordinator- Met with Tammie Vidal, reviewed plan of care. Reinforced sternal precautions and encouraged continued use of the incentive spirometer. Reviewed goals for the day and emphasized the importance of increased activity to meet discharge goals. Will continue to follow for educational and emotional needs.  Gokul Jarrett RN

## 2019-11-21 NOTE — CONSULTS
[address] Name:  Kenn Enriquez 
MR#:  [id number] :  1950 ACCOUNT #:  [de-identified] DATE OF SERVICE:  2019 REASON FOR CONSULTATION:  Left arm pain. HISTORY OF PRESENT ILLNESS:  The patient is a 19-year-old male who has been hospitalized for several weeks with heart failure. Three days ago, he had an LVAD placed. He remains in the cardiac surgery ICU. We were asked to see him because of left forearm pain. He had an arterial line in the left radial artery which function poorly and was removed and he has had pain in the forearm since then. The patient has a number of significant ongoing medical issues primarily related to cardiac failure. PAST MEDICAL HISTORY:  Heart disease with coronary artery disease and cardiomyopathy and reduced ejection fraction, atrial fibrillation, diabetes, and renal insufficiency. CURRENT MEDICATIONS: 
1. Amiodarone. 2.  Marinol. 3.  Insulin. 4.  Protonix. 5.  Coumadin. 6.  Angiomax. 7.  Dobutamine. 8.  Milrinone. ALLERGIES:  NONE. SOCIAL HISTORY:  The patient lives at home with his family. FAMILY HISTORY:  Unremarkable. REVIEW OF SYSTEMS:  Diffusely positive in light of his recent cardiac surgery in generalized debilitated state with generalized weakness, chest pain, shortness of breath, poor appetite, difficult urination, etc. 
 
PHYSICAL EXAMINATION: 
GENERAL:  He is a chronically ill-appearing male who is awake and responsive. LUNGS:  Bilateral breath sounds. HEART:  Regular rate and rhythm. ABDOMEN:  Nontender. EXTREMITIES:  His left forearm is somewhat swollen as is his left hand. There is no palpable radial artery pulse and a abnormal radial artery Doppler signal.  Pulses are nonpalpable at the brachial or axillary level. NEUROLOGIC:  Grossly normal with intact motor and sensory function. He is awake, alert, and responsive. IMPRESSION:  The patient has left forearm pain of unclear cause, though there was a concern for arterial insufficiency based on his physical exam.  We will obtain an arterial duplex and see what things look like. We will follow him with you. Armando Ashley MD 
 
 
[id number] 
D:  11/21/2019 15:17 
T:  11/21/2019 16:17 
JOB #:  [id number]

## 2019-11-21 NOTE — PROGRESS NOTES
[facility] 
 [address] [address], [facility] [address] [facility] Phone: [phone]   [phone]   
  
Nephrology Progress Note Sona Kiser     1950     [id number] Date of Admission : 10/25/2019 
11/21/19 CC:  Follow up for JUAN J, CKD, Hyponatremia Assessment and Plan JUAN J on CKD 
- 2/2 CRS and urinary retention - Cr stable - bumex + albumin x 1 this AM 
- keep neal 
- daily labs 
  
Acute on Chronic HFrEF  
- EF 16-20%, NYHA Class IV , hx of VF arrest - s/p AICD 
- Impella insertion 10/29- removed 11/18  
- s/p LVAD placement on 11/18 
- On Milrinone and dobutamine CKD Stage III  
- Mild Left renal atrophy at baseline Gross hematuria, BPH w/ retention: 
- off CBI pre VAD. cysto showed catheter related Cystitis @ postr wall  
- keep neal for today Hoarseness, vocal cord paralysis, mediastinal adenopathy: 
- will need eventual PET/CT 
  
Acute Resp failure JOSE on CPAP  
- Post op resp failure : On Vent  
  
PAF s/p DCCV 6/19 
  
Anemia: 
- from blood loss s/p multiple blood products - s/p IV iron,  Repeat iron studies ok 
- on PAVEL q7 d Serratia and Enteroccocus UTI: 
- completed abx Interval History:   
Seen and examined. Voiding some, not great w/ bumex yesterday. Clinically volume overloaded, CVP around 8-9 this AM.  C/o SOB. No n/v/d reported. Review of Systems: Pertinent items are noted in HPI. Current Medications:  
Current Facility-Administered Medications Medication Dose Route Frequency  amiodarone (CORDARONE) tablet 400 mg  400 mg Oral DAILY  pantoprazole (PROTONIX) tablet 40 mg  40 mg Oral ACB  insulin lispro (HUMALOG) injection   SubCUTAneous TIDAC  insulin lispro (HUMALOG) injection   SubCUTAneous AC&HS  insulin glargine (LANTUS) injection 1-50 Units  1-50 Units SubCUTAneous ONCE PRN  
 Warfarin MD/NP dosing   Other PRN  
 epoetin yvonne-epbx (RETACRIT) injection 20,000 Units  20,000 Units SubCUTAneous Q7D  
  0.9% sodium chloride infusion 250 mL  250 mL IntraVENous PRN  
 ceFAZolin (ANCEF) 1 g in 0.9% sodium chloride (MBP/ADV) 50 mL  1 g IntraVENous Q8H  
 dronabinol (MARINOL) capsule 2.5 mg  2.5 mg Oral ACB&D  
 bivalirudin (ANGIOMAX) 250 mg in 0.9% sodium chloride (MBP/ADV) 50 mL  0.02-2.5 mg/kg/hr IntraVENous TITRATE  lidocaine (LIDODERM) 5 % patch 1 Patch  1 Patch TransDERmal Q24H  
 0.9% sodium chloride infusion  9 mL/hr IntraVENous CONTINUOUS  
 0.45% sodium chloride infusion  10 mL/hr IntraVENous CONTINUOUS  
 sodium chloride (NS) flush 5-40 mL  5-40 mL IntraVENous Q8H  
 sodium chloride (NS) flush 5-40 mL  5-40 mL IntraVENous PRN  
 acetaminophen (TYLENOL) tablet 650 mg  650 mg Oral Q6H PRN  
 oxyCODONE IR (ROXICODONE) tablet 5 mg  5 mg Oral Q4H PRN  
 oxyCODONE IR (ROXICODONE) tablet 10 mg  10 mg Oral Q4H PRN  
 naloxone (NARCAN) injection 0.4 mg  0.4 mg IntraVENous PRN  
 mupirocin (BACTROBAN) 2 % ointment   Both Nostrils BID  ondansetron (ZOFRAN) injection 4 mg  4 mg IntraVENous Q4H PRN  
 albuterol-ipratropium (DUO-NEB) 2.5 MG-0.5 MG/3 ML  3 mL Nebulization Q6H PRN  chlorhexidine (PERIDEX) 0.12 % mouthwash 10 mL  10 mL Oral BID  senna-docusate (PERICOLACE) 8.6-50 mg per tablet 1 Tab  1 Tab Oral DAILY  polyethylene glycol (MIRALAX) packet 17 g  17 g Oral DAILY  bisacodyl (DULCOLAX) tablet 5 mg  5 mg Oral DAILY PRN  
 sucralfate (CARAFATE) tablet 1 g  1 g Oral AC&HS  
 0.9% sodium chloride infusion 250 mL  250 mL IntraVENous PRN  
 influenza vaccine 2019-20 (6 mos+)(PF) (FLUARIX/FLULAVAL/FLUZONE QUAD) injection 0.5 mL  0.5 mL IntraMUSCular PRIOR TO DISCHARGE  hydrALAZINE (APRESOLINE) 20 mg/mL injection 20 mg  20 mg IntraVENous Q6H PRN  
 morphine injection 4 mg  4 mg IntraVENous Q2H PRN  
 dextrose 10 % infusion 125-250 mL  125-250 mL IntraVENous PRN  
 milrinone (PRIMACOR) 20 MG/100 ML D5W infusion  0-0.75 mcg/kg/min IntraVENous TITRATE  DOBUTamine (DOBUTREX) 1,000 mg/250 mL (4,000 mcg/mL) infusion  0-10 mcg/kg/min IntraVENous TITRATE  nitroglycerin (Tridil) 200 mcg/ml infusion  0-200 mcg/min IntraVENous TITRATE  EPINEPHrine (ADRENALIN) 10 mg in 0.9% sodium chloride 250 mL infusion  0-10 mcg/min IntraVENous TITRATE  insulin regular (NOVOLIN R, HUMULIN R) 100 Units in 0.9% sodium chloride 100 mL infusion  1-50 Units/hr IntraVENous TITRATE  niCARdipine (CARDENE) 25 mg in 0.9% sodium chloride 250 mL infusion  0-15 mg/hr IntraVENous TITRATE  
 NOREPINephrine (LEVOPHED) 32 mg in dextrose 5% 250 mL (128 mcg/mL) infusion  0.5-16 mcg/min IntraVENous TITRATE  PHENYLephrine (JUAN PABLO-SYNEPHRINE) 100 mg in 0.9% sodium chloride 250 mL infusion   mcg/min IntraVENous TITRATE  vasopressin (VASOSTRICT) 20 Units in 0.9% sodium chloride 100 mL infusion  0-0.04 Units/min IntraVENous TITRATE  ELECTROLYTE REPLACEMENT NOTE: Nurse to review Serum Potassium and Magnesuim levels and Initiate Electrolyte Replacement Protocol as needed  1 Each Other PRN No Known Allergies Objective: 
Vitals:   
Vitals:  
 11/21/19 0500 11/21/19 0520 11/21/19 0600 11/21/19 0700 BP:      
Pulse: 88  78 97 Resp: 22  21 (!) 35 Temp:      
SpO2:   96% 91% Weight:  91.8 kg (202 lb 6.4 oz)  92.1 kg (203 lb) Height:      
 
Intake and Output: 
No intake/output data recorded. 11/19 1901 - 11/21 0700 In: 3068.5 [P.O.:780; I.V.:2288.5] Out: 0640 [Urine:2375; Drains:885] Physical Examination: 
 
General: On vent Neck:  Lines + Resp:  Rales B/L  
CV:  VADsounds  trace+ b/l LE edema GI:  Soft, NT, + Bowel sounds Neurologic:  SEDATED on vent :  Lizama w/ dark/ green urine [x]    High complexity decision making was performed 
[]    Patient is at high-risk of decompensation with multiple organ involvement Lab Data Personally Reviewed: I have reviewed all the pertinent labs, microbiology data and radiology studies during assessment. Recent Labs  
  11/21/19 0413 11/20/19 
0301 11/19/19 1858 11/19/19 0349 11/18/19 
2320 11/18/19 1851 11/18/19 
1432 * 138  --  141 141  --  141  
K 4.0 3.9 3.8 3.9 4.3  --  3.0*  
CL 98 102  --  106 105  --  104 CO2 28 29  --  28 29  --  29  
GLU 95 110*  --  102* 106*  --  178* BUN 23* 22*  --  19 18  --  14  
CREA 1.36* 1.43*  --  1.30 1.26  --  1.26  
CA 8.6 8.6  --  8.4* 8.3*  --  8.3*  
MG 1.9 2.0 2.1 2.0 2.0  --  1.8 ALB 2.6* 2.7*  --  2.7* 2.6*  --  2.2*  
SGOT 40* 60*  --  52* 50*  --  50* ALT 9* 9*  --  10* 12  --  15 INR 1.7* 1.4*  --  1.2*  --  1.3* 1.3* Recent Labs  
  11/21/19 0413 11/20/19 0301 11/19/19 1858 11/19/19 0349 11/18/19 1851 11/18/19 
1434 WBC 7.0 6.6  --  3.9*  --  4.0* HGB 8.6* 8.0* 7.9* 7.3* 8.0* 8.8* HCT 27.9* 25.9* 25.3* 23.1* 25.2* 27.9*  
* 92*  --  74*  --  88* No results found for: SDES Lab Results Component Value Date/Time Culture result: NO GROWTH 5 DAYS 11/12/2019 11:18 AM  
 Culture result: SERRATIA MARCESCENS (A) 10/31/2019 10:05 AM  
 Culture result: SERRATIA MARCESCENS (2ND COLONY TYPE/STRAIN) (A) 10/31/2019 10:05 AM  
 Culture result: ENTEROCOCCUS FAECALIS GROUP D (A) 10/31/2019 10:05 AM  
 Culture result: NO GROWTH 5 DAYS 10/25/2019 07:14 PM  
 
Recent Results (from the past 24 hour(s)) GLUCOSE, POC Collection Time: 11/20/19  8:46 AM  
Result Value Ref Range Glucose (POC) 106 (H) 65 - 100 mg/dL Performed by Jolie Dent Collection Time: 11/20/19  8:46 AM  
Result Value Ref Range Glucose 106 mg/dL Insulin order 1.8 units/hour Insulin adminstered 1.8 units/hour Multiplier 0.039 Low target 95 mg/dL High target 130 mg/dL D50 order 0.0 ml  
 D50 administered 0.00 ml Minutes until next BG 60 min Order initials elk Administered initials elk GLSCOM Comments GLUCOSE, POC Collection Time: 11/20/19  9:56 AM  
Result Value Ref Range Glucose (POC) 137 (H) 65 - 100 mg/dL [facility] Collection Time: 11/20/19  9:56 AM  
Result Value Ref Range Glucose 137 mg/dL Insulin order 3.8 units/hour Insulin adminstered 3.8 units/hour Multiplier 0.049 Low target 95 mg/dL High target 130 mg/dL D50 order 0.0 ml  
 D50 administered 0.00 ml Minutes until next BG 60 min Order initials dn   
 Administered initials dn   
 GLSCOM Comments PTT Collection Time: 11/20/19 10:59 AM  
Result Value Ref Range aPTT 47.6 (H) 22.1 - 32.0 sec  
 aPTT, therapeutic range     58.0 - 77.0 SECS  
GLUCOSE, POC Collection Time: 11/20/19 10:59 AM  
Result Value Ref Range Glucose (POC) 133 (H) 65 - 100 mg/dL [facility]formed [facility] Collection Time: 11/20/19 11:00 AM  
Result Value Ref Range Glucose 133 mg/dL Insulin order 4.3 units/hour Insulin adminstered 4.3 units/hour Multiplier 0.059 Low target 95 mg/dL High target 130 mg/dL D50 order 0.0 ml  
 D50 administered 0.00 ml Minutes until next BG 60 min Order initials wlk Administered initials elk GLSCOM Comments GLUCOSE, POC Collection Time: 11/20/19 12:04 PM  
Result Value Ref Range Glucose (POC) 86 65 - 100 mg/dL Performed by [employer] Collection Time: 11/20/19 12:04 PM  
Result Value Ref Range Glucose 86 mg/dL Insulin order 1.2 units/hour Insulin adminstered 1.2 units/hour Multiplier 0.047 Low target 95 mg/dL High target 130 mg/dL D50 order 0.0 ml  
 D50 administered 0.00 ml Minutes until next BG 60 min Order initials elk Administered initials elk GLSCOM Comments GLUCOSE, POC Collection Time: 11/20/19  1:06 PM  
Result Value Ref Range Glucose (POC) 80 65 - 100 mg/dL Performed by [facility] Collection Time: 11/20/19  1:07 PM  
Result Value Ref Range Glucose 80 mg/dL Insulin order 0.0 units/hour Insulin adminstered 0.0 units/hour Multiplier 0.037 Low target 95 mg/dL High target 130 mg/dL D50 order 8.0 ml  
 D50 administered 0.00 ml Minutes until next BG 15 min Order initials elk Administered initials elk GLSCOM Comments given juice po   
GLUCOSE, POC Collection Time: 11/20/19  1:29 PM  
Result Value Ref Range Glucose (POC) 95 65 - 100 mg/dL Performed by Kaur Decker Collection Time: 11/20/19  1:29 PM  
Result Value Ref Range Glucose 95 mg/dL Insulin order 1.2 units/hour Insulin adminstered 1.2 units/hour Multiplier 0.037 Low target 95 mg/dL High target 130 mg/dL D50 order 0.0 ml  
 D50 administered 0.00 ml Minutes until next BG 60 min Order initials dn   
 Administered initials dn   
 GLSCOM Comments GLUCOSE, POC Collection Time: 11/20/19  2:33 PM  
Result Value Ref Range Glucose (POC) 362 (H) 65 - 100 mg/dL Performed by PRISCILLA COLE   
GLUCOSE, POC Collection Time: 11/20/19  2:35 PM  
Result Value Ref Range Glucose (POC) 158 (H) 65 - 100 mg/dL Performed by Kaur Decker Collection Time: 11/20/19  2:36 PM  
Result Value Ref Range Glucose 158 mg/dL Insulin order 4.6 units/hour Insulin adminstered 4.6 units/hour Multiplier 0.047 Low target 95 mg/dL High target 130 mg/dL D50 order 0.0 ml  
 D50 administered 0.00 ml Minutes until next BG 60 min Order initials elk Administered initials elk GLSCOM Comments PTT Collection Time: 11/20/19  2:40 PM  
Result Value Ref Range aPTT 69.3 (H) 22.1 - 32.0 sec  
 aPTT, therapeutic range     58.0 - 77.0 SECS  
GLUCOSE, POC Collection Time: 11/20/19  4:02 PM  
Result Value Ref Range Glucose (POC) 103 (H) 65 - 100 mg/dL Performed by Kaur Decker Collection Time: 11/20/19  4:03 PM  
Result Value Ref Range Glucose 103 mg/dL Insulin order 2.0 units/hour Insulin adminstered 2.0 units/hour Multiplier 0.047 Low target 95 mg/dL High target 130 mg/dL D50 order 0.0 ml  
 D50 administered 0.00 ml Minutes until next BG 60 min Order initials elk Administered initials ekl GLSCOM Comments PTT Collection Time: 11/20/19  4:49 PM  
Result Value Ref Range aPTT 65.9 (H) 22.1 - 32.0 sec  
 aPTT, therapeutic range     58.0 - 77.0 SECS  
GLUCOSE, POC Collection Time: 11/20/19  5:05 PM  
Result Value Ref Range Glucose (POC) 84 65 - 100 mg/dL Performed by [employer] Collection Time: 11/20/19  5:06 PM  
Result Value Ref Range Glucose 84 mg/dL Insulin order 0.9 units/hour Insulin adminstered 0.9 units/hour Multiplier 0.037 Low target 95 mg/dL High target 130 mg/dL D50 order 0.0 ml  
 D50 administered 0.00 ml Minutes until next BG 60 min Order initials elk Administered initials trisha SAGASTUME Comments GLUCOSE, POC Collection Time: 11/20/19  6:35 PM  
Result Value Ref Range Glucose (POC) 112 (H) 65 - 100 mg/dL Performed by [employer] Collection Time: 11/20/19  6:36 PM  
Result Value Ref Range Glucose 112 mg/dL Insulin order 1.9 units/hour Insulin adminstered 1.9 units/hour Multiplier 0.037 Low target 95 mg/dL High target 130 mg/dL D50 order 0.0 ml  
 D50 administered 0.00 ml Minutes until next BG 60 min Order initials elk Administered initials trisha SAGASTUME Comments GLUCOSE, POC Collection Time: 11/20/19  7:58 PM  
Result Value Ref Range Glucose (POC) 121 (H) 65 - 100 mg/dL Performed by Say Etienne Collection Time: 11/20/19  7:58 PM  
Result Value Ref Range Glucose 121 mg/dL Insulin order 2.3 units/hour Insulin adminstered 2.3 units/hour Multiplier 0.037 Low target 95 mg/dL High target 130 mg/dL  D50 order 0.0 ml  
 D50 administered 0.00 ml Minutes until next BG 60 min Order initials sfm Administered initials sfm GLSCOM Comments GLUCOSE, POC Collection Time: 11/20/19  9:18 PM  
Result Value Ref Range Glucose (POC) 86 65 - 100 mg/dL Performed by [employer] Collection Time: 11/20/19  9:19 PM  
Result Value Ref Range Glucose 86 mg/dL Insulin order 0.7 units/hour Insulin adminstered 0.0 units/hour Multiplier 0.027 Low target 95 mg/dL High target 130 mg/dL D50 order 0.0 ml  
 D50 administered 0.00 ml Minutes until next BG 60 min Order initials sfm Administered initials sfm GLSCOM Comments RN held one hour GLUCOSE, POC Collection Time: 11/20/19 10:11 PM  
Result Value Ref Range Glucose (POC) 105 (H) 65 - 100 mg/dL Performed by [employer] Collection Time: 11/20/19 10:11 PM  
Result Value Ref Range Glucose 105 mg/dL Insulin order 1.2 units/hour Insulin adminstered 1.2 units/hour Multiplier 0.027 Low target 95 mg/dL High target 130 mg/dL D50 order 0.0 ml  
 D50 administered 0.00 ml Minutes until next BG 60 min Order initials sfm Administered initials sfm, TANIKA Comments GLUCOSE, POC Collection Time: 11/20/19 11:06 PM  
Result Value Ref Range Glucose (POC) 86 65 - 100 mg/dL Performed by [employer] Collection Time: 11/20/19 11:06 PM  
Result Value Ref Range Glucose 86 mg/dL Insulin order 0.4 units/hour Insulin adminstered 0.0 units/hour Multiplier 0.017 Low target 95 mg/dL High target 130 mg/dL D50 order 0.0 ml  
 D50 administered 0.00 ml Minutes until next BG 60 min Order initials sfm Administered initials sfkelly Fritz RN held one horu GLUCOSE, POC Collection Time: 11/21/19 12:06 AM  
Result Value Ref Range Glucose (POC) 90 65 - 100 mg/dL Performed by Vanessa Rabago Collection Time: 11/21/19 12:07 AM  
Result Value Ref Range Glucose 90 mg/dL Insulin order 0.2 units/hour Insulin adminstered 0.0 units/hour Multiplier 0.007 Low target 95 mg/dL High target 130 mg/dL D50 order 0.0 ml  
 D50 administered 0.00 ml Minutes until next BG 60 min Order initials sfm Administered initials sfm GLSCOM Comments RN holding one additonal hour GLUCOSE, POC Collection Time: 11/21/19  1:04 AM  
Result Value Ref Range Glucose (POC) 107 (H) 65 - 100 mg/dL Performed by Vanessa Rabago Collection Time: 11/21/19  1:05 AM  
Result Value Ref Range Glucose 107 mg/dL Insulin order 0.3 units/hour Insulin adminstered 0.3 units/hour Multiplier 0.007 Low target 95 mg/dL High target 130 mg/dL D50 order 0.0 ml  
 D50 administered 0.00 ml Minutes until next BG 60 min Order initials sfm Administered initials sfm GLSCOM Comments GLUCOSE, POC Collection Time: 11/21/19  2:05 AM  
Result Value Ref Range Glucose (POC) 113 (H) 65 - 100 mg/dL Performed by Vanessa Rabago Collection Time: 11/21/19  2:05 AM  
Result Value Ref Range Glucose 113 mg/dL Insulin order 0.4 units/hour Insulin adminstered 0.4 units/hour Multiplier 0.007 Low target 95 mg/dL High target 130 mg/dL D50 order 0.0 ml  
 D50 administered 0.00 ml Minutes until next BG 60 min Order initials sfm Administered initials sfm GLSCOM Comments GLUCOSE, POC Collection Time: 11/21/19  3:05 AM  
Result Value Ref Range Glucose (POC) 104 (H) 65 - 100 mg/dL Performed by Vanessa Rabago Collection Time: 11/21/19  3:05 AM  
Result Value Ref Range Glucose 104 mg/dL Insulin order 0.3 units/hour Insulin adminstered 0.3 units/hour Multiplier 0.007 Low target 95 mg/dL High target 130 mg/dL D50 order 0.0 ml  
 D50 administered 0.00 ml Minutes until next BG 60 min Order initials sfm Administered initials sfm GLSCOM Comments GLUCOSE, POC Collection Time: 11/21/19  4:11 AM  
Result Value Ref Range Glucose (POC) 99 65 - 100 mg/dL Performed by Love Frank Collection Time: 11/21/19  4:13 AM  
Result Value Ref Range Sodium 133 (L) 136 - 145 mmol/L Potassium 4.0 3.5 - 5.1 mmol/L Chloride 98 97 - 108 mmol/L  
 CO2 28 21 - 32 mmol/L Anion gap 7 5 - 15 mmol/L Glucose 95 65 - 100 mg/dL BUN 23 (H) 6 - 20 MG/DL Creatinine 1.36 (H) 0.70 - 1.30 MG/DL  
 BUN/Creatinine ratio 17 12 - 20 GFR est AA >60 >60 ml/min/1.73m2 GFR est non-AA 52 (L) >60 ml/min/1.73m2 Calcium 8.6 8.5 - 10.1 MG/DL Bilirubin, total 5.5 (H) 0.2 - 1.0 MG/DL  
 ALT (SGPT) 9 (L) 12 - 78 U/L  
 AST (SGOT) 40 (H) 15 - 37 U/L Alk. phosphatase 89 45 - 117 U/L Protein, total 5.4 (L) 6.4 - 8.2 g/dL Albumin 2.6 (L) 3.5 - 5.0 g/dL Globulin 2.8 2.0 - 4.0 g/dL A-G Ratio 0.9 (L) 1.1 - 2.2 MAGNESIUM Collection Time: 11/21/19  4:13 AM  
Result Value Ref Range Magnesium 1.9 1.6 - 2.4 mg/dL LD Collection Time: 11/21/19  4:13 AM  
Result Value Ref Range  (H) 85 - 241 U/L  
LACTIC ACID Collection Time: 11/21/19  4:13 AM  
Result Value Ref Range Lactic acid 1.2 0.4 - 2.0 MMOL/L  
CBC W/O DIFF Collection Time: 11/21/19  4:13 AM  
Result Value Ref Range WBC 7.0 4.1 - 11.1 K/uL  
 RBC 2.80 (L) 4.10 - 5.70 M/uL HGB 8.6 (L) 12.1 - 17.0 g/dL HCT 27.9 (L) 36.6 - 50.3 % MCV 99.6 (H) 80.0 - 99.0 FL  
 MCH 30.7 26.0 - 34.0 PG  
 MCHC 30.8 30.0 - 36.5 g/dL  
 RDW 19.9 (H) 11.5 - 14.5 % PLATELET 274 (L) 879 - 400 K/uL MPV 10.7 8.9 - 12.9 FL  
 NRBC 0.3 (H) 0  WBC ABSOLUTE NRBC 0.02 (H) 0.00 - 0.01 K/uL PROTHROMBIN TIME + INR  Collection Time: 11/21/19  4:13 AM  
 Result Value Ref Range INR 1.7 (H) 0.9 - 1.1 Prothrombin time 17.2 (H) 9.0 - 11.1 sec PTT Collection Time: 11/21/19  4:13 AM  
Result Value Ref Range aPTT 66.2 (H) 22.1 - 32.0 sec  
 aPTT, therapeutic range     58.0 - 77.0 SECS  
GLUCOSTABILIZER Collection Time: 11/21/19  4:14 AM  
Result Value Ref Range Glucose 99 mg/dL Insulin order 0.3 units/hour Insulin adminstered 0.3 units/hour Multiplier 0.007 Low target 95 mg/dL High target 130 mg/dL D50 order 0.0 ml  
 D50 administered 0.00 ml Minutes until next BG 60 min Order initials sfm Administered initials sfm GLSCOM Comments POC VENOUS BLOOD GAS Collection Time: 11/21/19  4:15 AM  
Result Value Ref Range Device: NASAL CANNULA Flow rate (POC) 6 L/M  
 pH, venous (POC) 7.424 (H) 7.32 - 7.42    
 pCO2, venous (POC) 36.7 (L) 41 - 51 MMHG  
 pO2, venous (POC) 28 25 - 40 mmHg HCO3, venous (POC) 24.0 23.0 - 28.0 MMOL/L  
 sO2, venous (POC) 55 (L) 65 - 88 % Base excess, venous (POC) 0 mmol/L Allens test (POC) N/A Site [facility] Specimen type (POC) VENOUS BLOOD    
GLUCOSE, POC Collection Time: 11/21/19  5:15 AM  
Result Value Ref Range Glucose (POC) 104 (H) 65 - 100 mg/dL Performed by Amanda Shine Collection Time: 11/21/19  5:15 AM  
Result Value Ref Range Glucose 104 mg/dL Insulin order 0.3 units/hour Insulin adminstered 0.3 units/hour Multiplier 0.007 Low target 95 mg/dL High target 130 mg/dL D50 order 0.0 ml  
 D50 administered 0.00 ml Minutes until next BG 60 min Order initials sfm Administered initials sfm GLSCOM Comments GLUCOSE, POC Collection Time: 11/21/19  6:12 AM  
Result Value Ref Range Glucose (POC) 91 65 - 100 mg/dL Performed by Amanda Shine Collection Time: 11/21/19  6:13 AM  
Result Value Ref Range Glucose 91 mg/dL Insulin order 0.0 units/hour Insulin adminstered 0.0 units/hour Multiplier 0.000 Low target 95 mg/dL High target 130 mg/dL D50 order 0.0 ml  
 D50 administered 0.00 ml Minutes until next BG 60 min Order initials sfm Administered initials sfm GLSCOM Comments GLUCOSE, POC Collection Time: 11/21/19  7:14 AM  
Result Value Ref Range Glucose (POC) 121 (H) 65 - 100 mg/dL Performed by Chapin Jimenez Collection Time: 11/21/19  7:14 AM  
Result Value Ref Range Glucose 121 mg/dL Insulin order 0.0 units/hour Insulin adminstered 0.0 units/hour Multiplier 0.000 Low target 95 mg/dL High target 130 mg/dL D50 order 0.0 ml  
 D50 administered 0.00 ml Minutes until next BG 60 min Order initials sfm Administered initials sfm GLSCOM Comments GLUCOSE, POC Collection Time: 11/21/19  8:26 AM  
Result Value Ref Range Glucose (POC) 120 (H) 65 - 100 mg/dL Performed by Armand Rodriguez Collection Time: 11/21/19  8:27 AM  
Result Value Ref Range Glucose 120 mg/dL Insulin order 0.0 units/hour Insulin adminstered 0.0 units/hour Multiplier 0.000 Low target 95 mg/dL High target 130 mg/dL D50 order 0.0 ml  
 D50 administered 0.00 ml Minutes until next BG 60 min Order initials vf   
 Administered initials vf   
 [facility] Collection Time: 11/21/19  8:29 AM  
Result Value Ref Range Carbohydrate eaten 104 g Insulin bolus ordered 6.9 units/hour Insulin bolus administered 6.9 units/hour Carb ratio 15 Minutes until next BG 60 min Order initials vf   
 Administered initials vf   
 GLSCOM Comments Total time spent with patient:  xxx   min. Care Plan discussed with: 
Patient Family RN Consulting Physician [address] [facility],      
 
I have reviewed the flowsheets. Chart and Pertinent Notes have been reviewed. No change in PMH ,family and social history from Consult note.  
 
 
Dominique Darden MD

## 2019-11-21 NOTE — PROGRESS NOTES
NYHA class IV A/C systolic heart failure Low EF (10%) Inotrope dependent Malnutrition  
LVAD Work-up Epistaxis Hematuria Still having issues with left arm / hand pain Vascular surgery in to see patient Will get duplex PTT therapeutic INR elevated Creatinine in the mid 1's Bilirubin remains elevated Other LFTs look good LDH and lactic acid reasonable CXR - mild to moderate pulmonary edema Visit Vitals BP 93/71 (BP 1 Location: Right arm, BP Patient Position: At rest) Comment: map 66 Pulse 97 Temp 99.1 °F (37.3 °C) Resp 27 Ht 6' 2\" (1.88 m) Wt 203 lb (92.1 kg) SpO2 (!) 88% BMI 26.06 kg/m² LVAD Pump Speed (RPM): 5200 Pump Flow (LPM): 3.8 MAP: 82 
PI (Pulsitility Index): 4.5 Power: 3.6  Test: Yes 
Back Up  at Bedside & Labeled: Yes Power Module Test: Yes Driveline Site Care: No 
Driveline Dressing: New drainage Outpatient: No 
MAP in Therapeutic Range (Outpatient): Yes Testing Alarms Reviewed: Yes 
Back up SC speed: 5200 Back up Low Speed Limit: 4800 Emergency Equipment with Patient?: Yes Emergency procedures reviewed?: Yes Drive line site inspected?: No 
Drive line intergrity inspected?: Yes Drive line dressing changed?: No 
 
 
Intake/Output Summary (Last 24 hours) at 11/21/2019 1356 Last data filed at 11/21/2019 1330 Gross per 24 hour Intake 1739.1 ml Output 2360 ml Net -620.9 ml  
 
Risk of morbidity and mortality - high Medical decision making - high complexity Total critical care time - 30 minutes (CPT 02364)

## 2019-11-21 NOTE — PROGRESS NOTES
NUTRITION COMPLETE ASSESSMENT 
 
RECOMMENDATIONS:  
1. Assist with tray-set up as needed with encouragement of supplements/high protein foods 2. If intake remains poor over next 3-4 days consider increase in marinol dosing 3. Daily weights Please document  Severe Acute on Chronic Protein Calorie Malnutrition in patient diagnoses. Patient now meets criteria for as evidenced by:  
ASPEN Malnutrition Criteria Acute Illness, Chronic Illness, or Social/Enviornmental: Acute illness Energy Intake: <75% est energy req for > 7 days Weight Loss: Greater than 7.5% x 3 mos Body Fat Loss: Moderate Muscle Mass Loss: Moderate Fluid Accumulation: Moderate ASPEN Malnutrition Score - Acute Illness: 19 Acute Illness - Malnutrition Diagnosis: Severe malnutrition. Interventions/Plan:  
Food/Nutrient Delivery:  General/healthful diet Commercial supplement(see below) Nutrition Education:    discussed protein supplements and high protein foods Assessment:  
Reason for Assessment: Reassessment Diet: mechanical soft, NCS Supplements: Ensure Enlive 1x/day, Magic Cup 1x/day and Boost Pudding 1x/day Nutritionally Significant Medications: [x] Reviewed & Includes: albumin, amiodarone, ancef, marinol (2.5mg BID), SSI, mag sulfate, protonix, miralax, pericolace, carafate 4x/day, coumadin Meal Intake:  
Patient Vitals for the past 100 hrs: 
 % Diet Eaten 11/21/19 0800 50 % 11/20/19 1900 50 % 11/20/19 1400 50 % 11/20/19 0929 25 % Pre-Hospitalization: 
Usual Appetite: Good Diet at Home: regular Vitamins/Supplements: No 
 
Current Hospitalization:  
Appetite: Fair PO Ability: Independent Average po intake:25-50% Average supplements intake: 1-2 per day Subjective: I liked the boost pudding. .. I need to get adjusted in the bed. Objective: 
Pt admitted for CHF. PMHx: HTN, CKD, chronic systolic heart failure, depression, others noted.  S/p removal of impella (placed 10/29) and LVAD placement on 11/18. Remains on milrinone and dobutamine. Renal follow for JUAN J on CKF likely d/t CRS. Bumex rx with fluid overload and BLLE edema today. Hyponatremia, ?diluational.  
 
Marinol resumed today (started prior to surgery). Seen by SLP and recommendations to continue [facility] soft diet for now. Appetite has been fair (see above). Did not like Ensure Enlive but agreeable to Boost Pudding and Magic Cup (berry). Will increase each to BID for total of 1050kcal, 34g protein if 100% consumed. Hope that as further out from surgery and with restart of marinol that appetite will continue to improve. Severe wt loss over past 6 months. Pt reports related to fluid fluctuations. Had been eating well PTA however noticeable muscle/fat wasting. Estimated Nutrition Needs:  
Kcals/day: 2000 Kcals/day([id number] (MSJ x 1.1-1.2) Protein: 100 g(1.2g/kg) Fluid: (1 ml/kcal) Based On: [place] 1898 Weight Used: Actual wt(83 kg) Pt expected to meet estimated nutrient needs:  []   Yes     []  No [x] Unable to predict at this time Nutrition Diagnosis:  
1. Unintended weight loss(malnutrition) related to CHF vs depression vs malignancy as evidenced by >10% weight loss x 6 months; severe muscle/fat wasting 2. Inadequate oral intake related to poor appetite as evidenced by post-op LVAD, marinol rx, <50% meals consumed;  
Goals:   
 Consumption of at least 75% meals and 1-2 suppements/day in 5-7 days Monitoring & Evaluation: - Total energy intake, Liquid meal replacement, Protein intake - Weight/weight change, Electrolyte and renal profile(swallow fx) Previous Nutrition Goals Met:   Progressing Previous Recommendations:    N/A Education & Discharge Needs: 
 [] None Identified 
 [x] Identified and addressed [x] Participated in care plan, discharge planning, and/or interdisciplinary rounds Cultural, Buddhist and ethnic food preferences identified: None Skin Integrity: []Intact  [x]Other: sternal wound Edema: []None [x]Other: 1+ generalized, 1+ LUE, 3+ BLLE Last BM: 11/21 Food Allergies: []None []Other Diet Restrictions: Cultural/Hoahaoism Preference(s): None Anthropometrics:   
Weight Loss Metrics 11/21/2019 10/25/2019 10/25/2019 10/25/2019 9/30/2019 9/18/2019 9/16/2019 Today's Wt 203 lb - 193 lb 6.4 oz - 199 lb 14.4 oz 196 lb 199 lb BMI - 26.06 kg/m2 - 24.83 kg/m2 25.67 kg/m2 25.16 kg/m2 25.55 kg/m2 Weight Source: Standing scale (comment) Height: 6' 2\" (188 cm), Body mass index is 26.06 kg/m². IBW : 86.2 kg (190 lb), % IBW (Calculated): 96.32 % 
 ,   
 
Labs:   
Lab Results Component Value Date/Time Sodium 133 (L) 11/21/2019 04:13 AM  
 Potassium 4.0 11/21/2019 04:13 AM  
 Chloride 98 11/21/2019 04:13 AM  
 CO2 28 11/21/2019 04:13 AM  
 Glucose 95 11/21/2019 04:13 AM  
 BUN 23 (H) 11/21/2019 04:13 AM  
 Creatinine 1.36 (H) 11/21/2019 04:13 AM  
 Calcium 8.6 11/21/2019 04:13 AM  
 Magnesium 1.9 11/21/2019 04:13 AM  
 Phosphorus 2.4 (L) 06/04/2019 04:09 AM  
 Albumin 2.6 (L) 11/21/2019 04:13 AM  
 
Lab Results Component Value Date/Time Hemoglobin A1c 6.6 (H) 10/25/2019 02:56 PM  
 
 
Joan Segal, JEANNINE [address] , Pager #[phone] or [phone]

## 2019-11-21 NOTE — PROGRESS NOTES
Problem: Dysphagia (Adult) Goal: *Acute Goals and Plan of Care (Insert Text) Description Speech Pathology Re-evaluation 11/20/2019 1. Patient will tolerate mechanical soft/thin liquid diet with no overt s/s aspiration within 7 days Initiated 10/28/19 1. Patient will tolerate regular diet/thin liquids without overt s/s of aspiration within 7 days MET 2. Patient will participate in [facility] for further objective assessment of swallow physiology within 7 days (once medically stable from Impella/cardiac sx standpoint) NOT MET; discontinue Outcome: Progressing Towards Goal 
  
SPEECH LANGUAGE PATHOLOGY DYSPHAGIA TREATMENT Patient: Priyanka Arora (75 y.o. male) Date: 11/21/2019 Diagnosis: Acute decompensated heart failure ([facility] [I50.9] <principal problem not specified> Procedure(s) (LRB): REMOVAL TEMPORARY LEFT VENTRICULAR ASSIST DEVICE, IMPLANTATION OF LEFT VENTRICULAR ASSIST DEVICE PERMANENT (VAD), ECC, JACQUE, EPI AORTIC US BY DR Anju Tiwari. (Left) 3 Days Post-Op Precautions: Aspiration Fall, Sternal 
 
ASSESSMENT: 
Patient presents with mild-moderate pharyngeal dysphagia characterized by suspected pharyngeal swallow delay and reduced laryngeal elevation via palpation. Patient able to state compensatory swallow strategy of no straws as well as rationale for why he demonstrated difficulty with straws. This date patient tolerated thin liquids in single cup sips without overt s/s aspiration. Risk of aspiration remains elevated given dysphagia, impaired vocal quality and general weakness. Patient reports difficulty with solid foods (\"rolling around in his mouth. \")  Patient declined solid trials reporting full from lunch however patient did report when he dipped sand which in soup it was easier to swallow. PLAN: 
Recommendations and Planned Interventions: 
Mechanical soft diet (patient to self titrate to moisture rich foods.) Thin liquids via cup No straws Single bites and sips Patient continues to benefit from skilled intervention to address the above impairments. Continue treatment per established plan of care. Discharge Recommendations: To Be Determined SUBJECTIVE:  
Patient stated It feels like the food just rolls around in my mouth. .   
 
OBJECTIVE:  
Cognitive and Communication Status: 
Neurologic State: Alert Orientation Level: (P) Oriented X4 Cognition: (P) Appropriate decision making Perception: Appears intact Perseveration: No perseveration noted Safety/Judgement: Decreased insight into deficits Dysphagia Treatment: 
Oral Assessment: P.O. Trials: 
Patient Position: Upright in bed Vocal quality prior to P.O.: Hoarse Consistency Presented: Thin liquid How Presented: Self-fed/presented;Cup/sip Bolus Acceptance: No impairment Bolus Formation/Control: No impairment Propulsion: No impairment Oral Residue: None Initiation of Swallow: Delayed (# of seconds) Laryngeal Elevation: Decreased Aspiration Signs/Symptoms: None Pharyngeal Phase Severity : Mild-moderate After treatment:  
[]              Patient left in no apparent distress sitting up in chair 
[x]              Patient left in no apparent distress in bed 
[x]              Call bell left within reach [x]              Nursing notified 
[]              Caregiver present 
[]              Bed alarm activated COMMUNICATION/EDUCATION:  
Patient was educated regarding role of SLP, diet, swallow precautions and POC. The patients plan of care including recommendations, planned interventions, and recommended diet changes were discussed with: Registered Nurse. []              Posted safety precautions in patient's room. NICO Romo Time Calculation: 17 mins

## 2019-11-21 NOTE — PROGRESS NOTES
Problem: Mobility Impaired (Adult and Pediatric) Goal: *Acute Goals and Plan of Care (Insert Text) Description FUNCTIONAL STATUS PRIOR TO ADMISSION: Patient was modified independent using a single point cane for functional mobility. Patient reports an increasingly sedentary lifestyle 2* fatigue and SOB/dyspnea. Retired (so is his wife). Patient reports x 3 falls within the last couple of weeks. Patient is wearing either nasal cannula or CPAP at night. LVAD work-up has been initiated. HOME SUPPORT PRIOR TO ADMISSION: The patient lived with his wife, but did not require physical assistance. Physical Therapy Goals: 
 
Re-evaluation completed 11/20/2019 and new goals formulated following LVAD implantation. 1.  Patient will move from supine to sit and sit to supine, scoot up and down and roll side to side in bed with minimal assistance/contact guard assist within 7 days. 2.  Patient will perform sit to/from stand with minimal assistance/contact guard assist within 7 days. 3.  Patient will ambulate 150 feet with least restrictive assistive device and minimal assistance/contact guard assist within 7 days. 4.  Patient will ascend/descend 4 stairs with handrail(s) with minimal assistance/contact guard assist within 7 days. 5.  Patient will perform cardiac exercises per protocol with supervision within 7 days. 6.  Patient will verbally and functionally recall 3/3 sternal precautions within 7 days. 7.  Patient will perform power exchange for power module to/from battery with moderate assistance  within 7 days. Initiated 10/27/2019; updated 11/15/2019 1. Patient will move from supine to sit and sit to supine, scoot up and down and roll side to side in bed with independence within 7 days. 2.  Patient will perform sit to/from stand with supervision/set-up within 7 days. 3.  Patient will ambulate 200 feet with least restrictive assistive device and supervision/set-up within 7 days. 4.  Patient will ascend/descend 4 stairs with  handrail(s) with supervision/set-up within 7 days for functional strengthening and community reintegration. [place] 5.  Patient will verbally and functionally recall 3/3 sternal precautions within 7 days in preparation for LVAD implantation. 6.  Patient will perform a mock power exchange for power module to/from battery with supervision/set-up within 7 days in preparation for LVAD implantation. 1.  Patient will move from supine to sit and sit to supine, scoot up and down and roll side to side in bed with independence within 7 days. 2.  Patient will perform sit to/from stand with supervision/set-up within 7 days. 3.  Patient will ambulate 150 feet with least restrictive assistive device and supervision/set-up within 7 days. 4.  Patient will ascend/descend 4 stairs with  handrail(s) with supervision/set-up within 7 days for functional strengthening and community reintegration. [place] 5.  Patient will verbally and functionally recall 3/3 sternal precautions within 7 days in preparation for LVAD implantation. 6.  Patient will perform a mock power exchange for power module to/from battery with supervision/set-up within 7 days in preparation for LVAD implantation. Outcome: Progressing Towards Goal 
  
PHYSICAL THERAPY TREATMENT Patient: Arianna Wynne (75 y.o. male) Date: 11/21/2019 Diagnosis: [facility] [I50.9] <principal problem not specified> Procedure(s) (LRB): REMOVAL TEMPORARY LEFT VENTRICULAR ASSIST DEVICE, IMPLANTATION OF LEFT VENTRICULAR ASSIST DEVICE PERMANENT (VAD), ECC, JACQUE, EPI AORTIC US BY DR Abhilash Zazueta. (Left) 3 Days Post-Op Precautions: Fall, Sternal 
Chart, physical therapy assessment, plan of care and goals were reviewed. ASSESSMENT Patient continues with skilled PT services and is slowly progressing towards goals. Patient with good recall of \". \" Introduced two more parts: drive-line, and power leads. Noted increased L UE swelling, particularly in the dorsal aspect of his hand; however, observed improved motor control and strength. Patient required intermittent verbal cuing for functional adherence to sternal precautions and increased steadying needs in static and dynamic standing. Patient demonstrated bilateral knee softening, posterior trunk lean, and multi-directional trunk swaying/trembling in standing. Patient required increased assistance to even attain standing: maximal assist x 2 (+ third person for line management). Patient participated in several short-distance gait trials (~ 1 ft; maximal assist x 2) 2* gait deficits, poor dynamic standing balance and tolerance, and for fall prevention/safety. At conclusion of session, noted small dime-spot sanguinous drainage from sternal incision (RN made aware). Based on today's performance, anticipate intensive rehabilitation needs post-hospital discharge. Current Level of Function Impacting Discharge (mobility/balance): Max A x 2 Other factors to consider for discharge: High fall risk, sternal precautions, new LVAD management/alarm trouble-shooting PLAN : 
Patient continues to benefit from skilled intervention to address the above impairments. Continue treatment per established plan of care. to address goals. Recommendation for discharge: (in order for the patient to meet his/her long term goals) Therapy 3 hours per day 5-7 days per week This discharge recommendation: A follow-up discussion with the attending provider and/or case management is planned IF patient discharges home will need the following DME: to be determined (TBD) SUBJECTIVE:  
Patient stated Girls, I'm having trouble today.  OBJECTIVE DATA SUMMARY:  
Critical Behavior: 
Neurologic State: Alert Orientation Level: Oriented X4 Cognition: Appropriate for age attention/concentration Safety/Judgement: Decreased insight into deficits, Decreased awareness of need for safety, Decreased awareness of need for assistance Functional Mobility Training: 
Bed Mobility: 
  
  
Sit to Supine: Assist x2; Moderate assistance Transfers: 
Sit to Stand: Assist x2;Maximum assistance(From [facility] several times) Stand to Sit: Assist x2; Moderate assistance(Poor eccentric control) Bed to Chair: Assist x2;Maximum assistance(3rd person for line management) Balance: 
Sitting: Impaired; Without support Sitting - Static: Good (unsupported) Sitting - Dynamic: Fair (occasional) Standing: Impaired; With support Standing - Static: Poor;Constant support(B knee softening; posterior trunk leaning) Standing - Dynamic : Poor;Constant support Therapeutic Exercises:  
Sit <> stand transfers for functional strengthening and technique carryover Activity Tolerance:  
Poor, requires frequent rest breaks, and observed SOB with activity SvO2 down to 15% - required about 3-4 minutes to recover Please refer to the flowsheet for vital signs taken during this treatment. After treatment patient left in no apparent distress:  
Supine in bed, Call bell within reach, and Side rails x 3 
 
COMMUNICATION/COLLABORATION:  
The patients plan of care was discussed with: Occupational Therapist, Registered Nurse, and Rehabilitation Attendant Daria Mobley PT, DPT Time Calculation: 38 mins

## 2019-11-21 NOTE — PROGRESS NOTES
Problem: Self Care Deficits Care Plan (Adult) Goal: *Acute Goals and Plan of Care (Insert Text) Description FUNCTIONAL STATUS PRIOR TO ADMISSION: Patient was modified independent using a single point cane for functional mobility. Patient able to shower and dress himself. However, patient required assistance for household management from his wife. HOME SUPPORT: The patient lived alone with wife to provide assistance. Occupational Therapy Goals: 
 
Goals reviewed and continued/modified as follows 11/20/19 s/p LVAD implantation 1. Patient will perform upper body dressing including LVAD switchovers with moderate assistance within 7 day(s). 2.  Patient will perform lower body dressing with minimal assistance within 7 day(s). 3.  Patient will perform grooming with supervision/setup within 7 day(s). 4.  Patient will perform toilet transfers supervision/setupmodified independence within 7 day(s). 5.  Patient will perform all aspects of toileting with minimal assistance within 7 day(s). 6.  Patient will participate in upper extremity therapeutic exercise/activities with supervision/set-up for 5 minutes within 7 day(s). 7.  Patient will utilize energy conservation techniques during functional activities with verbal cues within 7 day(s). 8. Patient will verbalize LVAD terminology with verbal cues within 7 day (s). Goals reviewed and continued/modified as follows 11/12/19 1. Patient will perform bathing with supervision/set-up within 7 day(s). 2.  Patient will perform lower body dressing with supervision/set-up within 7 day(s). 3.  Patient will perform grooming with modified independence within 7 day(s). 4.  Patient will perform toilet transfers with modified independence within 7 day(s). 5.  Patient will perform all aspects of toileting with supervision/set-up within 7 day(s).  
6.  Patient will participate in upper extremity therapeutic exercise/activities with supervision/set-up for 5 minutes within 7 day(s). 7.  Patient will utilize energy conservation techniques during functional activities with verbal cues within 7 day(s). 8. Patient will verbalize LVAD terminology with verbal cues within 7 day (s) in preparation for implant. Continue all goals 10/30/19 post re-eval for Impella removal  
Initiated 10/28/2019 1. Patient will perform bathing with supervision/set-up within 7 day(s). 2.  Patient will perform lower body dressing with supervision/set-up within 7 day(s). 3.  Patient will perform grooming with modified independence within 7 day(s). 4.  Patient will perform toilet transfers with modified independence within 7 day(s). 5.  Patient will perform all aspects of toileting with supervision/set-up within 7 day(s). 6.  Patient will participate in upper extremity therapeutic exercise/activities with supervision/set-up for 5 minutes within 7 day(s). 7.  Patient will utilize energy conservation techniques during functional activities with verbal cues within 7 day(s). Outcome: Progressing Towards Goal 
 OCCUPATIONAL THERAPY TREATMENT Patient: Verona Cyr (75 y.o. male) Date: 11/21/2019 Diagnosis: [facility] decompensated [facility] [I50.9] <principal problem not specified> Procedure(s) (LRB): REMOVAL TEMPORARY LEFT VENTRICULAR ASSIST DEVICE, IMPLANTATION OF LEFT VENTRICULAR ASSIST DEVICE PERMANENT (VAD), ECC, JACQUE, EPI AORTIC US BY DR Olaf Song. (Left) 3 Days Post-Op Precautions: Fall, Sternal 
Chart, occupational therapy assessment, plan of care, and goals were reviewed. ASSESSMENT Patient continues with skilled OT services and is progressing towards goals.   Patient continues to present with LUE edema/dull achy pain, decreased LUE FM/GM coordination, decreased LUE strength (3/5), generalized weakness, decreased endurance, and decreased activity tolerance with patient's SVO2 dropping from 39-18 with minimal functional activity. Patient received on [facility] in session and continent of bowel. Patient performed pericare standing however requiring MAX A for balance and MAX A x 2-3 for side stepping back to bed due to physical assist and line management. Patient recalling 3/3 sternal precautions today and recalling LVAD terminology of \"LVAD\" and \"driveline\". Educated now on power leads. Patient will continue to benefit from OT services to maximize patient safety and independence with ADL transfers and tasks. Current Level of Function Impacting Discharge (ADLs): MAX A LB ADLs Other factors to consider for discharge: patient requiring increased physical assistance today PLAN : 
Patient continues to benefit from skilled intervention to address the above impairments. Continue treatment per established plan of care. to address goals. Recommend with staff: OOB x 3 to chair; BUE cardiac exercises Recommend next OT session: ADL training Recommendation for discharge: (in order for the patient to meet his/her long term goals) Therapy 3 hours per day 5-7 days per week This discharge recommendation: 
Has been made in collaboration with the attending provider and/or case management IF patient discharges home will need the following DME: to be determined (TBD) SUBJECTIVE:  
Patient stated My left hand is killing me.  OBJECTIVE DATA SUMMARY:  
Cognitive/Behavioral Status: 
Neurologic State: Alert Orientation Level: Oriented X4 Cognition: Appropriate for age attention/concentration Perception: Appears intact Perseveration: No perseveration noted Safety/Judgement: Decreased insight into deficits; Decreased awareness of need for safety;Decreased awareness of need for assistance Functional Mobility and Transfers for ADLs: 
Bed Mobility: 
Sit to Supine: Assist x2; Moderate assistance Transfers: Sit to Stand: Assist x2;Maximum assistance(From [facility] several times) Functional Transfers Toilet Transfer : Maximum assistance;Assist x2; Additional time Bed to Chair: Assist x2;Maximum assistance(3rd person for line management) Balance: 
Sitting: Impaired; Without support Sitting - Static: Good (unsupported) Sitting - Dynamic: Fair (occasional) Standing: Impaired; With support Standing - Static: Poor;Constant support(B knee softening; posterior trunk leaning) Standing - Dynamic : Poor;Constant support ADL Intervention: Toileting Toileting Assistance: Maximum assistance(A for clothing management and standing balance w/ pericare) Cognitive Retraining Safety/Judgement: Decreased insight into deficits; Decreased awareness of need for safety;Decreased awareness of need for assistance The patient instructed and demonstrated 3/3 sternal precautions. Patient instructed no asymmetrical reaching over head to ensure B UEs when shoulders >90* i.e. reaching in cabinets and dressing. Instruction on upper body dressing techniques of over head, then arms through to decrease pain and unilateral shoulder flexion >90*. Instruction on the benefits of utilizing B UEs during functional tasks i.e. opening the fridge, stepping into the tub. Increase activity tolerance for home, work, and sexual intercourse by pacing self with increasing the arm exercises, sitting duration, frequency OOB, walking, standing, and ADLs. Instructed and indicated understanding of s/s of too much activity, how to respond to s/s safely. Activity Tolerance:  
Fair, requires rest breaks, and SVO2 dropping significantly with activity Please refer to the flowsheet for vital signs taken during this treatment. After treatment patient left in no apparent distress:  
Supine in bed and Call bell within reach COMMUNICATION/COLLABORATION:  
The patients plan of care was discussed with: Physical Therapist and Registered Nurse Georgi Garcia Time Calculation: 34 mins

## 2019-11-22 NOTE — PROGRESS NOTES
NYHA class IV A/C systolic heart failure Low EF (10%) Inotrope dependent Malnutrition  
LVAD Work-up Epistaxis Hematuria Looks good this am  
 
More pulmonary edema on CXR Will increase diuretics Hgb and platelets look good PTT therapeutic Creatinine normal 
 
Bilirubin and other LFTs look good LDH and lactic acid reasonable Lactic acid normal 
 
NT pro-BNP coming down CXR - pulmonary edema Visit Vitals BP 93/71 (BP 1 Location: Right arm, BP Patient Position: At rest) Comment: map 66 Pulse 95 Temp 99 °F (37.2 °C) Resp 29 Ht 6' 2\" (1.88 m) Wt 206 lb 9.6 oz (93.7 kg) SpO2 96% BMI 26.53 kg/m² LVAD Pump Speed (RPM): 5200 Pump Flow (LPM): 3.6 MAP: 82 
PI (Pulsitility Index): 5.6 Power: 3.6  Test: No 
Back Up  at Bedside & Labeled: Yes Power Module Test: Yes Driveline Site Care: Yes Driveline Dressing: Clean, Dry, and Intact Outpatient: No 
MAP in Therapeutic Range (Outpatient): Yes Testing Alarms Reviewed: Yes 
Back up SC speed: 5200 Back up Low Speed Limit: 4800 Emergency Equipment with Patient?: Yes Emergency procedures reviewed?: Yes Drive line site inspected?: Yes Drive line intergrity inspected?: Yes Drive line dressing changed?: Yes Intake/Output Summary (Last 24 hours) at 11/22/2019 1050 Last data filed at 11/22/2019 4511 Gross per 24 hour Intake 1617.08 ml Output 2435 ml Net -817.92 ml Visit Vitals BP 93/71 (BP 1 Location: Right arm, BP Patient Position: At rest) Comment: map 66 Pulse 95 Temp 99 °F (37.2 °C) Resp 29 Ht 6' 2\" (1.88 m) Wt 206 lb 9.6 oz (93.7 kg) SpO2 96% BMI 26.53 kg/m² Risk of morbidity and mortality - high Medical decision making - high complexity Total critical care time - 30 minutes (CPT 61959)

## 2019-11-22 NOTE — PROGRESS NOTES
ID Progress Note 
2019 Subjective:  
 
Remains off vent, looks good Objective: Antibiotics: 1. Levaquin 2. Rifampin 3. Fluconazole 4. Vancomycin 5. Cefazolin Vitals:  
Visit Vitals BP 93/71 (BP 1 Location: Right arm, BP Patient Position: At rest) Comment: map 66 Pulse 85 Temp 99.2 °F (37.3 °C) Resp 17 Ht 6' 2\" (1.88 m) Wt 93.7 kg (206 lb 9.6 oz) SpO2 97% BMI 26.53 kg/m² Tmax:  Temp (24hrs), Av.7 °F (37.1 °C), Min:98.4 °F (36.9 °C), Max:99.2 °F (37.3 °C) Exam:  Lungs:  clear to auscultation bilaterally Heart:  regular rate and rhythm Abdomen:  soft, non-tender. Bowel sounds normal. No masses,  no organomegaly Urine is clearing up Labs:     
Recent Labs  
  19 
0403 19 
0413 19 
0301 19 
1858 WBC 6.5 7.0 6.6  --   
HGB 8.7* 8.6* 8.0* 7.9*  
* 105* 92*  --   
BUN 24* 23* 22*  --   
CREA 1.20 1.36* 1.43*  --   
SGOT 23 40* 60*  --   
AP 90 89 72  --   
TBILI 2.9* 5.5* 5.1*  --   
 
 
Cultures: No results found for: [facility], [place] Lab Results Component Value Date/Time Culture result: NO GROWTH 5 DAYS 2019 11:18 AM  
 Culture result: SERRATIA MARCESCENS (A) 10/31/2019 10:05 AM  
 Culture result: SERRATIA MARCESCENS (2ND COLONY TYPE/STRAIN) (A) 10/31/2019 10:05 AM  
 Culture result: ENTEROCOCCUS FAECALIS GROUP D (A) 10/31/2019 10:05 AM  
 
 
Radiology:  
 
Line/Insert Date:        
 
Assessment:  
 
1. UTI--Serratia (2 biotypes) from culture and small amount of Enterococcus 2. CHF/cardiomyopathy Objective: 1. Continue current therapy 2. LVAD Munir Garsia MD

## 2019-11-22 NOTE — PROGRESS NOTES
1100: ultrasound in to do left arm ultrasound 1400: PA catheter out. PICC team in to place 1500: central lines out

## 2019-11-22 NOTE — PROGRESS NOTES
Called Audrey to follow up on training which occurred earlier this week between their home health agency and [facility] [place], [address] Karla Oliva RN. Per Jhon James is prepared to care for Edgar Holloway when he goes home. She requested updated clinicals. Will fax her updated clinicals on this date (fax #[phone]). Notified her he is not discharging this week and care manager will be in contact as discharge date gets closer. Update: Faxed the patient's [facility] serious medical condition form to [place] on this date. Mary Ann Jordan, MSW, LCSW Clinical  [address] [phone]

## 2019-11-22 NOTE — PROGRESS NOTES
[facility] 
 [address] [facility], [address] [facility], [facility] Phone: [phone]   [phone]   
  
Nephrology Progress Note Sona Kiser     1950     [id number] Date of Admission : 10/25/2019 
11/22/19 CC:  Follow up for JUAN J, CKD, Hyponatremia Assessment and Plan JUAN J on CKD 
- 2/2 CRS and urinary retention - worsening CHF since coming off Dobutamine : may need to resume it  --> defer to AHF team  
- volume overloaded : Bumex 2 mg IV Q12 hr and hold dose if CVP < 10.  
- PRN albumin for HYpotension 
- labs Q12hr  
  
Gross hematuria, BPH w/ retention: 
- off CBI pre VAD. cysto pre op showed catheter related Cystitis @ postr wall  
- incomplete voiding since neal removal ==> started Flomax. Avoid re-inserting neal Acute on Chronic HFrEF  
- EF 16-20%, NYHA Class IV , hx of VF arrest - s/p AICD 
- Impella insertion 10/29- removed 11/18  
- s/p LVAD placement on 11/18 
- On Milrinone and off Dobutamine. CKD Stage III  
- Mild Left renal atrophy at baseline Hoarseness, vocal cord paralysis, mediastinal adenopathy: 
- will need eventual PET/CT 
  
Acute Resp failure JOSE on CPAP  
- Post op resp failure : On Vent  
  
PAF s/p DCCV 6/19 
  
Anemia: 
- from blood loss s/p multiple blood products - s/p IV iron,  Repeat iron studies ok 
- on PAVEL q7 d Serratia and Enteroccocus UTI: 
- completed abx Interval History:   
Seen and examined. Worsening SOB Off Dobutamine since yesterday SVO2 down to 30s PA pressures, CVP increased. CXR still wet w/ fluid in R fissure Abdomen slightly more distended, had BS  And passing flatus Review of Systems: Pertinent items are noted in HPI. Current Medications:  
Current Facility-Administered Medications Medication Dose Route Frequency  bumetanide (BUMEX) injection 2 mg  2 mg IntraVENous Q12H  tamsulosin (FLOMAX) capsule 0.4 mg  0.4 mg Oral DAILY  amiodarone (CORDARONE) tablet 400 mg  400 mg Oral DAILY  pantoprazole (PROTONIX) tablet 40 mg  40 mg Oral ACB  insulin lispro (HUMALOG) injection   SubCUTAneous TIDAC  insulin lispro (HUMALOG) injection   SubCUTAneous AC&HS  Warfarin MD/NP dosing   Other PRN  
 epoetin yvonne-epbx (RETACRIT) injection 20,000 Units  20,000 Units SubCUTAneous Q7D  
 0.9% sodium chloride infusion 250 mL  250 mL IntraVENous PRN  
 ceFAZolin (ANCEF) 1 g in 0.9% sodium chloride (MBP/ADV) 50 mL  1 g IntraVENous Q8H  
 dronabinol (MARINOL) capsule 2.5 mg  2.5 mg Oral ACB&D  
 bivalirudin (ANGIOMAX) 250 mg in 0.9% sodium chloride (MBP/ADV) 50 mL  0.02-2.5 mg/kg/hr IntraVENous TITRATE  lidocaine (LIDODERM) 5 % patch 1 Patch  1 Patch TransDERmal Q24H  
 0.9% sodium chloride infusion  9 mL/hr IntraVENous CONTINUOUS  
 0.45% sodium chloride infusion  10 mL/hr IntraVENous CONTINUOUS  
 sodium chloride (NS) flush 5-40 mL  5-40 mL IntraVENous Q8H  
 sodium chloride (NS) flush 5-40 mL  5-40 mL IntraVENous PRN  
 acetaminophen (TYLENOL) tablet 650 mg  650 mg Oral Q6H PRN  
 oxyCODONE IR (ROXICODONE) tablet 5 mg  5 mg Oral Q4H PRN  
 oxyCODONE IR (ROXICODONE) tablet 10 mg  10 mg Oral Q4H PRN  
 naloxone (NARCAN) injection 0.4 mg  0.4 mg IntraVENous PRN  
 mupirocin (BACTROBAN) 2 % ointment   Both Nostrils BID  ondansetron (ZOFRAN) injection 4 mg  4 mg IntraVENous Q4H PRN  
 albuterol-ipratropium (DUO-NEB) 2.5 MG-0.5 MG/3 ML  3 mL Nebulization Q6H PRN  chlorhexidine (PERIDEX) 0.12 % mouthwash 10 mL  10 mL Oral BID  senna-docusate (PERICOLACE) 8.6-50 mg per tablet 1 Tab  1 Tab Oral DAILY  polyethylene glycol (MIRALAX) packet 17 g  17 g Oral DAILY  bisacodyl (DULCOLAX) tablet 5 mg  5 mg Oral DAILY PRN  
 sucralfate (CARAFATE) tablet 1 g  1 g Oral AC&HS  
 0.9% sodium chloride infusion 250 mL  250 mL IntraVENous PRN  
 influenza vaccine 2019-20 (6 mos+)(PF) (FLUARIX/FLULAVAL/FLUZONE QUAD) injection 0.5 mL  0.5 mL IntraMUSCular PRIOR TO DISCHARGE  hydrALAZINE (APRESOLINE) 20 mg/mL injection 20 mg  20 mg IntraVENous Q6H PRN  
 morphine injection 4 mg  4 mg IntraVENous Q2H PRN  
 dextrose 10 % infusion 125-250 mL  125-250 mL IntraVENous PRN  
 milrinone (PRIMACOR) 20 MG/100 ML D5W infusion  0-0.75 mcg/kg/min IntraVENous TITRATE  DOBUTamine (DOBUTREX) 1,000 mg/250 mL (4,000 mcg/mL) infusion  0-10 mcg/kg/min IntraVENous TITRATE  insulin regular (NOVOLIN R, HUMULIN R) 100 Units in 0.9% sodium chloride 100 mL infusion  1-50 Units/hr IntraVENous TITRATE  ELECTROLYTE REPLACEMENT NOTE: Nurse to review Serum Potassium and Magnesuim levels and Initiate Electrolyte Replacement Protocol as needed  1 Each Other PRN No Known Allergies Objective: 
Vitals:   
Vitals:  
 11/22/19 0200 11/22/19 0300 11/22/19 0400 11/22/19 0500 BP:      
Pulse: 84 86 85 87 Resp: 17 17 25 21 Temp:   98.4 °F (36.9 °C) SpO2: 99% 97% 98% Weight:      
Height:      
 
Intake and Output: 
11/21 1901 - 11/22 0700 In: 380 [I.V.:380] Out: 660 [Urine:450; Drains:210] 11/20 0701 - 11/21 1900 In: 3640.4 [P.O.:1620; I.V.:2020.4] Out: 1141 [Urine:3205; YSWCSY:150] Physical Examination: 
 
General: On vent Neck:  Lines + Resp:  Rales B/L  
CV:  VADsounds  2+ b/l LE edema GI:  Soft, NT, + Bowel sounds Neurologic:  AAO X3  
:  No neal [x]    High complexity decision making was performed 
[]    Patient is at high-risk of decompensation with multiple organ involvement Lab Data Personally Reviewed: I have reviewed all the pertinent labs, microbiology data and radiology studies during assessment. Recent Labs  
  11/22/19 
0403 11/21/19 
0413 11/20/19 
0301 11/19/19 
1858 * 133* 138  --   
K 3.5 4.0 3.9 3.8 CL 97 98 102  --   
CO2 27 28 29  --   
GLU 95 95 110*  --   
BUN 24* 23* 22*  --   
CREA 1.20 1.36* 1.43*  --   
CA 8.4* 8.6 8.6  --   
MG 1.9 1.9 2.0 2.1 ALB 2.5* 2.6* 2.7*  --   
 SGOT 23 40* 60*  --   
ALT 8* 9* 9*  --   
INR 1.7* 1.7* 1.4*  --   
 
Recent Labs  
  11/22/19 
0403 11/21/19 
0413 11/20/19 
0301 11/19/19 
1858 WBC 6.5 7.0 6.6  --   
HGB 8.7* 8.6* 8.0* 7.9*  
HCT 28.0* 27.9* 25.9* 25.3*  
* 105* 92*  -- No results found for: [facility] Lab Results Component Value Date/Time Culture result: NO GROWTH 5 DAYS 11/12/2019 11:18 AM  
 Culture result: SERRATIA MARCESCENS (A) 10/31/2019 10:05 AM  
 Culture result: SERRATIA MARCESCENS (2ND COLONY TYPE/STRAIN) (A) 10/31/2019 10:05 AM  
 Culture result: ENTEROCOCCUS FAECALIS GROUP D (A) 10/31/2019 10:05 AM  
 Culture result: NO GROWTH 5 DAYS 10/25/2019 07:14 PM  
 
Recent Results (from the past 24 hour(s)) GLUCOSE, POC Collection Time: 11/21/19  6:12 AM  
Result Value Ref Range Glucose (POC) 91 65 - 100 mg/dL Performed by Sionex Collection Time: 11/21/19  6:13 AM  
Result Value Ref Range Glucose 91 mg/dL Insulin order 0.0 units/hour Insulin adminstered 0.0 units/hour Multiplier 0.000 Low target 95 mg/dL High target 130 mg/dL D50 order 0.0 ml  
 D50 administered 0.00 ml Minutes until next BG 60 min Order initials sfm Administered initials sfm GLSCOM Comments GLUCOSE, POC Collection Time: 11/21/19  7:14 AM  
Result Value Ref Range Glucose (POC) 121 (H) 65 - 100 mg/dL Performed by Sionex Collection Time: 11/21/19  7:14 AM  
Result Value Ref Range Glucose 121 mg/dL Insulin order 0.0 units/hour Insulin adminstered 0.0 units/hour Multiplier 0.000 Low target 95 mg/dL High target 130 mg/dL D50 order 0.0 ml  
 D50 administered 0.00 ml Minutes until next BG 60 min Order initials sfm Administered initials sfm GLSCOM Comments GLUCOSE, POC Collection Time: 11/21/19  8:26 AM  
Result Value Ref Range Glucose (POC) 120 (H) 65 - 100 mg/dL Performed by Rox Pruitt Collection Time: 11/21/19  8:27 AM  
Result Value Ref Range Glucose 120 mg/dL Insulin order 0.0 units/hour Insulin adminstered 0.0 units/hour Multiplier 0.000 Low target 95 mg/dL High target 130 mg/dL D50 order 0.0 ml  
 D50 administered 0.00 ml Minutes until next BG 60 min Order initials vf   
 Administered initials vf   
 GLSCOM Comments Dena Geoffrey Collection Time: 11/21/19  8:29 AM  
Result Value Ref Range Carbohydrate eaten 104 g Insulin bolus ordered 6.9 units/hour Insulin bolus administered 6.9 units/hour Carb ratio 15 Minutes until next BG 60 min Order initials vf   
 Administered initials vf   
 GLSCOM Comments GLUCOSE, POC Collection Time: 11/21/19  9:48 AM  
Result Value Ref Range Glucose (POC) 160 (H) 65 - 100 mg/dL Performed by Darleen Perez Collection Time: 11/21/19  9:49 AM  
Result Value Ref Range Glucose 160 mg/dL Insulin order 0.0 units/hour Insulin adminstered 0.0 units/hour Multiplier 0.000 Low target 95 mg/dL High target 130 mg/dL D50 order 0.0 ml  
 D50 administered 0.00 ml Minutes until next BG 60 min Order initials jrm Administered initials jrm GLSCOM Comments GLUCOSE, POC Collection Time: 11/21/19 11:16 AM  
Result Value Ref Range Glucose (POC) 128 (H) 65 - 100 mg/dL Performed by Sandralee Canavan Dalia Dyers Collection Time: 11/21/19 11:17 AM  
Result Value Ref Range Glucose 128 mg/dL Insulin order 0.0 units/hour Insulin adminstered 0.0 units/hour Multiplier 0.000 Low target 95 mg/dL High target 130 mg/dL D50 order 0.0 ml  
 D50 administered 0.00 ml Minutes until next BG 60 min Order initials vf   
 Administered initials vf   
 GLSCOM Comments GLUCOSE, POC Collection Time: 11/21/19 12:19 PM  
Result Value Ref Range Glucose (POC) 127 (H) 65 - 100 mg/dL Performed by Sheryle Gibbons Collection Time: 11/21/19 12:20 PM  
Result Value Ref Range Glucose 127 mg/dL Insulin order 0.0 units/hour Insulin adminstered 0.0 units/hour Multiplier 0.000 Low target 95 mg/dL High target 130 mg/dL D50 order 0.0 ml  
 D50 administered 0.00 ml Minutes until next BG 60 min Order initials vf   
 Administered initials vf   
 GLSCOM Comments Artelia Vasquez Collection Time: 11/21/19  1:42 PM  
Result Value Ref Range Carbohydrate eaten 86 g Insulin bolus ordered 5.7 units/hour Insulin bolus administered 5.7 units/hour Carb ratio 15 Minutes until next BG 60 min Order initials vf   
 Administered initials vf   
 GLSCOM Comments NT-PRO BNP Collection Time: 11/21/19  3:48 PM  
Result Value Ref Range NT pro-BNP 13,400 (H) <125 PG/ML  
PTT Collection Time: 11/21/19  3:48 PM  
Result Value Ref Range aPTT 73.5 (H) 22.1 - 32.0 sec  
 aPTT, therapeutic range     58.0 - 77.0 SECS  
TYPE & SCREEN Collection Time: 11/21/19  3:58 PM  
Result Value Ref Range Crossmatch Expiration 11/24/2019 ABO/Rh(D) O POSITIVE Antibody screen NEG Comment Previously identified nonspecific antibody and nonspecific cold antibody Unit number [id number] Blood component type  LR Unit division 00 Status of unit ALLOCATED Crossmatch result Compatible GLUCOSE, POC Collection Time: 11/21/19  3:58 PM  
Result Value Ref Range Glucose (POC) 112 (H) 65 - 100 mg/dL Performed by Demetrius Gudino POC Collection Time: 11/21/19 10:55 PM  
Result Value Ref Range Glucose (POC) 127 (H) 65 - 100 mg/dL Performed by Charmayne Crow Collection Time: 11/22/19  4:03 AM  
Result Value Ref Range Sodium 132 (L) 136 - 145 mmol/L Potassium 3.5 3.5 - 5.1 mmol/L  Chloride 97 97 - 108 mmol/L  
 CO2 27 21 - 32 mmol/L  
 Anion gap 8 5 - 15 mmol/L Glucose 95 65 - 100 mg/dL BUN 24 (H) 6 - 20 MG/DL Creatinine 1.20 0.70 - 1.30 MG/DL  
 BUN/Creatinine ratio 20 12 - 20 GFR est AA >60 >60 ml/min/1.73m2 GFR est non-AA >60 >60 ml/min/1.73m2 Calcium 8.4 (L) 8.5 - 10.1 MG/DL Bilirubin, total 2.9 (H) 0.2 - 1.0 MG/DL  
 ALT (SGPT) 8 (L) 12 - 78 U/L  
 AST (SGOT) 23 15 - 37 U/L Alk. phosphatase 90 45 - 117 U/L Protein, total 5.5 (L) 6.4 - 8.2 g/dL Albumin 2.5 (L) 3.5 - 5.0 g/dL Globulin 3.0 2.0 - 4.0 g/dL A-G Ratio 0.8 (L) 1.1 - 2.2 MAGNESIUM Collection Time: 11/22/19  4:03 AM  
Result Value Ref Range Magnesium 1.9 1.6 - 2.4 mg/dL LD Collection Time: 11/22/19  4:03 AM  
Result Value Ref Range  (H) 85 - 241 U/L  
LACTIC ACID Collection Time: 11/22/19  4:03 AM  
Result Value Ref Range Lactic acid 1.1 0.4 - 2.0 MMOL/L  
CBC W/O DIFF Collection Time: 11/22/19  4:03 AM  
Result Value Ref Range WBC 6.5 4.1 - 11.1 K/uL  
 RBC 2.84 (L) 4.10 - 5.70 M/uL HGB 8.7 (L) 12.1 - 17.0 g/dL HCT 28.0 (L) 36.6 - 50.3 % MCV 98.6 80.0 - 99.0 FL  
 MCH 30.6 26.0 - 34.0 PG  
 MCHC 31.1 30.0 - 36.5 g/dL  
 RDW 19.4 (H) 11.5 - 14.5 % PLATELET 512 (L) 419 - 400 K/uL MPV 10.3 8.9 - 12.9 FL  
 NRBC 0.0 0  WBC ABSOLUTE NRBC 0.00 0.00 - 0.01 K/uL PROTHROMBIN TIME + INR Collection Time: 11/22/19  4:03 AM  
Result Value Ref Range INR 1.7 (H) 0.9 - 1.1 Prothrombin time 16.8 (H) 9.0 - 11.1 sec PTT Collection Time: 11/22/19  4:03 AM  
Result Value Ref Range aPTT 64.1 (H) 22.1 - 32.0 sec  
 aPTT, therapeutic range     58.0 - 77.0 SECS  
NT-PRO BNP Collection Time: 11/22/19  4:03 AM  
Result Value Ref Range NT pro-BNP 11,445 (H) <125 PG/ML Total time spent with patient:  xxx   min. Care Plan discussed with: 
Patient Family RN Consulting Physician [address],      
 
I have reviewed the flowsheets. Chart and Pertinent Notes have been reviewed. No change in PMH ,family and social history from Consult note.  
 
 
Ander Valerio MD

## 2019-11-22 NOTE — PROGRESS NOTES
Problem: Self Care Deficits Care Plan (Adult) Goal: *Acute Goals and Plan of Care (Insert Text) Description FUNCTIONAL STATUS PRIOR TO ADMISSION: Patient was modified independent using a single point cane for functional mobility. Patient able to shower and dress himself. However, patient required assistance for household management from his wife. HOME SUPPORT: The patient lived alone with wife to provide assistance. Occupational Therapy Goals: 
 
Goals reviewed and continued/modified as follows 11/20/19 s/p LVAD implantation 1. Patient will perform upper body dressing including LVAD switchovers with moderate assistance within 7 day(s). 2.  Patient will perform lower body dressing with minimal assistance within 7 day(s). 3.  Patient will perform grooming with supervision/setup within 7 day(s). 4.  Patient will perform toilet transfers supervision/setupmodified independence within 7 day(s). 5.  Patient will perform all aspects of toileting with minimal assistance within 7 day(s). 6.  Patient will participate in upper extremity therapeutic exercise/activities with supervision/set-up for 5 minutes within 7 day(s). 7.  Patient will utilize energy conservation techniques during functional activities with verbal cues within 7 day(s). 8. Patient will verbalize LVAD terminology with verbal cues within 7 day (s). Goals reviewed and continued/modified as follows 11/12/19 1. Patient will perform bathing with supervision/set-up within 7 day(s). 2.  Patient will perform lower body dressing with supervision/set-up within 7 day(s). 3.  Patient will perform grooming with modified independence within 7 day(s). 4.  Patient will perform toilet transfers with modified independence within 7 day(s). 5.  Patient will perform all aspects of toileting with supervision/set-up within 7 day(s).  
6.  Patient will participate in upper extremity therapeutic exercise/activities with supervision/set-up for 5 minutes within 7 day(s). 7.  Patient will utilize energy conservation techniques during functional activities with verbal cues within 7 day(s). 8. Patient will verbalize LVAD terminology with verbal cues within 7 day (s) in preparation for implant. Continue all goals 10/30/19 post re-eval for Impella removal  
Initiated 10/28/2019 1. Patient will perform bathing with supervision/set-up within 7 day(s). 2.  Patient will perform lower body dressing with supervision/set-up within 7 day(s). 3.  Patient will perform grooming with modified independence within 7 day(s). 4.  Patient will perform toilet transfers with modified independence within 7 day(s). 5.  Patient will perform all aspects of toileting with supervision/set-up within 7 day(s). 6.  Patient will participate in upper extremity therapeutic exercise/activities with supervision/set-up for 5 minutes within 7 day(s). 7.  Patient will utilize energy conservation techniques during functional activities with verbal cues within 7 day(s). Outcome: Progressing Towards Goal 
 OCCUPATIONAL THERAPY TREATMENT Patient: Reid Baumann (75 y.o. male) Date: 11/22/2019 Diagnosis: Acute decompensated heart failure ([facility] [[email or web address]]ca 75.) [I50.9] <principal problem not specified> Procedure(s) (LRB): REMOVAL TEMPORARY LEFT VENTRICULAR ASSIST DEVICE, IMPLANTATION OF LEFT VENTRICULAR ASSIST DEVICE PERMANENT (VAD), ECC, JACQUE, EPI AORTIC US BY DR Amarjit Krishnan. (Left) 4 Days Post-Op Precautions: Fall, Sternal 
Chart, occupational therapy assessment, plan of care, and goals were reviewed. ASSESSMENT Patient continues with skilled OT services and is progressing towards goals. Patient continues to present with generalized weakness, impaired standing balance, decreased endurance, and decreased activity tolerance however with decreased pain and increased ROM in LUE today.  Also noted patient's LUE edema decreased however still dark in color (vascular following). Patient receptive to increased education on LVAD terminology today with patient's son present and supportive. OT / PT also facilitated BLE stretching in preparation for LB dressing, and multiple sit <> stand trials in preparation for ADL tasks (noted patient with posterior lean and requiring tactile and verbal cues for correcting posture). Patient will continue to benefit from OT services to maximize patient safety and independence with ADL transfers and tasks. Current Level of Function Impacting Discharge (ADLs): MAX A LB ADLs Other factors to consider for discharge: LVAD  III 
    
 
PLAN : 
Patient continues to benefit from skilled intervention to address the above impairments. Continue treatment per established plan of care. to address goals. Recommend with staff: OOB x 3 to chair; BUE cardiac exercises Recommend next OT session: Standing ADLs Recommendation for discharge: (in order for the patient to meet his/her long term goals) Therapy 3 hours per day 5-7 days per week This discharge recommendation: 
Has been made in collaboration with the attending provider and/or case management IF patient discharges home will need the following DME: to be determined (TBD) SUBJECTIVE:  
Patient stated My arm feels better today.  OBJECTIVE DATA SUMMARY:  
Cognitive/Behavioral Status: 
Neurologic State: Alert Orientation Level: Oriented X4 Cognition: Appropriate for age attention/concentration Perception: Appears intact Perseveration: No perseveration noted Safety/Judgement: Decreased insight into deficits Functional Mobility and Transfers for ADLs: 
 
 
Transfers: 
Sit to Stand: Moderate assistance;Maximum assistance;Assist x2; Additional time(multiple trials today-last trial MONI x2) Balance: 
Sitting: Impaired; Without support Sitting - Static: Good (unsupported) Sitting - Dynamic: Fair (occasional) Standing: Impaired; With support Standing - Static: Poor;Constant support Standing - Dynamic : Poor;Constant support ADL Intervention: 
  
 
  
 
  
 
  
 
  
 
  
 
  
 
Cognitive Retraining Safety/Judgement: Decreased insight into deficits The patient instructed and demonstrated 3/3 sternal precautions. Attempting tailor sit seated in recliner; patient with increased hip flexion/external rotation however needs continued stretching to increase flexibility (also limited by BLE edema at this time). Patient recalled VAD equipment in prep for ADL routine with assist:  
Item Correct Identification Location Function Comments  x   With supervision and verbal cueing Emergency Bag Battery Clips x    With supervision and verbal cueing Display Module Battery Charger Power Module Battery  x   With supervision and verbal cueing Drive Line x   With supervision and verbal cueing Power Leads x   With supervision and verbal cueing Battery Holster Holster Vest      
  
 
Activity Tolerance:  
Fair and requires rest breaks Please refer to the flowsheet for vital signs taken during this treatment. After treatment patient left in no apparent distress:  
Sitting in chair, Call bell within reach, and Caregiver / family present COMMUNICATION/COLLABORATION:  
The patients plan of care was discussed with: Physical Therapist and Registered Nurse Maddi Denney Time Calculation: 30 mins

## 2019-11-22 NOTE — PROGRESS NOTES
2000: Received report from Shelia Herrera [place]. Christelle dual verified. Pt up in chair. 2130: Pt back to bed with 2-3 assist, pt very weak, SvO2 decreased from 50's to 10's with movement. Pt assisted back to bed safely. 0400: CXR at bedside, labs sent. 0530: PA pressures trending upwards, CVP as well. SVo2 30-40's and 10's with activity, pt SOB when laying HOB flat. 56: Dr. Micheal Asher at bedside, updated on above. Orders received to give bumex, starting flomax for small amounts of urine output. 0800: Bedside and Verbal shift change report given to Rosemary WILKES (oncoming nurse) by Eva Engle (offgoing nurse). Report included the following information SBAR, Kardex, OR Summary, Procedure Summary, MAR, Recent Results, Cardiac Rhythm PACED and Alarm Parameters .

## 2019-11-22 NOTE — PROGRESS NOTES
The CM spoke with F LCSW, CM provided update to Linwood Trivedi with with [facility] on 11/21, LCSW endorses that [facility] has received LVAD training from coordinator and are following patient for anticipate home health needs. CM will follow for transitions of care.  SUSHIL Sotelo

## 2019-11-22 NOTE — PROGRESS NOTES
Vascular: 
 
Arterial duplex not yet done, forearm pain somewhat better and less tender - will follow. Do not anticipate intervention if arterial studies OK.

## 2019-11-22 NOTE — PROGRESS NOTES
Problem: Mobility Impaired (Adult and Pediatric) Goal: *Acute Goals and Plan of Care (Insert Text) Description FUNCTIONAL STATUS PRIOR TO ADMISSION: Patient was modified independent using a single point cane for functional mobility. Patient reports an increasingly sedentary lifestyle 2* fatigue and SOB/dyspnea. Retired (so is his wife). Patient reports x 3 falls within the last couple of weeks. Patient is wearing either nasal cannula or CPAP at night. LVAD work-up has been initiated. HOME SUPPORT PRIOR TO ADMISSION: The patient lived with his wife, but did not require physical assistance. Physical Therapy Goals: 
 
Re-evaluation completed 11/20/2019 and new goals formulated following LVAD implantation. 1.  Patient will move from supine to sit and sit to supine, scoot up and down and roll side to side in bed with minimal assistance/contact guard assist within 7 days. 2.  Patient will perform sit to/from stand with minimal assistance/contact guard assist within 7 days. 3.  Patient will ambulate 150 feet with least restrictive assistive device and minimal assistance/contact guard assist within 7 days. 4.  Patient will ascend/descend 4 stairs with handrail(s) with minimal assistance/contact guard assist within 7 days. 5.  Patient will perform cardiac exercises per protocol with supervision within 7 days. 6.  Patient will verbally and functionally recall 3/3 sternal precautions within 7 days. 7.  Patient will perform power exchange for power module to/from battery with moderate assistance  within 7 days. Initiated 10/27/2019; updated 11/15/2019 1. Patient will move from supine to sit and sit to supine, scoot up and down and roll side to side in bed with independence within 7 days. 2.  Patient will perform sit to/from stand with supervision/set-up within 7 days. 3.  Patient will ambulate 200 feet with least restrictive assistive device and supervision/set-up within 7 days. 4.  Patient will ascend/descend 4 stairs with  handrail(s) with supervision/set-up within 7 days for functional strengthening and community reintegration. Jeanie Lira 5.  Patient will verbally and functionally recall 3/3 sternal precautions within 7 days in preparation for LVAD implantation. 6.  Patient will perform a mock power exchange for power module to/from battery with supervision/set-up within 7 days in preparation for LVAD implantation. 1.  Patient will move from supine to sit and sit to supine, scoot up and down and roll side to side in bed with independence within 7 days. 2.  Patient will perform sit to/from stand with supervision/set-up within 7 days. 3.  Patient will ambulate 150 feet with least restrictive assistive device and supervision/set-up within 7 days. 4.  Patient will ascend/descend 4 stairs with  handrail(s) with supervision/set-up within 7 days for functional strengthening and community reintegration. Jeanie Lira 5.  Patient will verbally and functionally recall 3/3 sternal precautions within 7 days in preparation for LVAD implantation. 6.  Patient will perform a mock power exchange for power module to/from battery with supervision/set-up within 7 days in preparation for LVAD implantation. Outcome: Progressing Towards Goal 
  
PHYSICAL THERAPY TREATMENT Patient: Donny Shaikh (75 y.o. male) Date: 11/22/2019 Diagnosis: Acute decompensated heart failure ([facility] [address] [I50.9] <principal problem not specified> Procedure(s) (LRB): REMOVAL TEMPORARY LEFT VENTRICULAR ASSIST DEVICE, IMPLANTATION OF LEFT VENTRICULAR ASSIST DEVICE PERMANENT (VAD), ECC, JACQUE, EPI AORTIC US BY DR Kiesha Pisano. (Left) 4 Days Post-Op Precautions: Fall, Sternal 
Chart, physical therapy assessment, plan of care and goals were reviewed. ASSESSMENT Patient continues with skilled PT services and is slowly progressing towards goals; patient remains in CVICU fully lined. Noted plans for L brachial artery thrombectomy Monday, 11/25/19. This date, patient demonstrating improved UE gross motor coordination and manipulation - able to place/remove batteries from battery clips. Patient still demonstrating difficulty with VAD terminology, particularly \"power leads\" and the importance of drive-line integrity. Patient participating in several sit <> stand transfers for functional strengthening (extended rest breaks required between each attempt) - requiring maximal facilitation for aligning and maintaining COM over his CHHAYA (preference for posterior trunk lean with weight-acceptance through heels), Patient's standing tolerance ~ 30-35 seconds. Encouraged active ROM/exercises for B LE edema and hip stretches. Next Session: Continued repetitive sit <> stand transfers (for technique and eccentric strengthening), progressive standing tolerance (+ introduction of x 1 UE cardiac exercise [scapular elevation]) Current Level of Function Impacting Discharge (mobility/balance): A x 2 Other factors to consider for discharge: B LE edema, L UE edema, new LVAD - fair terminology retention, HIGH fall risk PLAN : 
Patient continues to benefit from skilled intervention to address the above impairments. Continue treatment per established plan of care. to address goals. Recommendation for discharge: (in order for the patient to meet his/her long term goals) Therapy 3 hours per day 5-7 days per week This discharge recommendation: A follow-up discussion with the attending provider and/or case management is planned IF patient discharges home will need the following DME: to be determined (TBD) SUBJECTIVE:  
Patient stated I feel maybe a tiny bit stronger than yesterday.  OBJECTIVE DATA SUMMARY:  
Critical Behavior: 
Neurologic State: Alert Orientation Level: Oriented X4 Cognition: Appropriate for age attention/concentration Safety/Judgement: Decreased insight into deficits - frequent verbal cuing needed for functional adherence to sternal precautions Functional Mobility Training: 
 
Transfers: 
Sit to Stand: Assist x2; Moderate assistance Stand to Sit: Assist x2; Moderate assistance Balance: 
Sitting: Impaired; Without support Sitting - Static: Good (unsupported) Sitting - Dynamic: Fair (occasional) Standing: Impaired; With support Standing - Static: Poor;Constant support Standing - Dynamic : Poor;Constant support(Moderate-maximal facilitation for COM over CHHAYA) Activity Tolerance:  
Fair, requires frequent rest breaks, and observed SOB with activity Please refer to the flowsheet for vital signs taken during this treatment. After treatment patient left in no apparent distress:  
Sitting in chair, Call bell within reach, and Caregiver / family present COMMUNICATION/COLLABORATION:  
The patients plan of care was discussed with: Occupational Therapist, Registered Nurse, Physician, and  Rhett Martines PT, DPT Time Calculation: 30 mins

## 2019-11-22 NOTE — PROCEDURES
PICC Placement Note PRE-PROCEDURE VERIFICATION Correct Procedure: yes Correct Site:  yes Temperature: Temp: 99.2 °F (37.3 °C), Temperature Source: Temp Source: Pulmonary Artery Recent Labs  
  11/22/19 
0403 BUN 24* CREA 1.20 * INR 1.7* WBC 6.5 Allergies: Patient has no known allergies. Education materials, including PICC Booklet and written information regarding central catheter related bloodstream infection and prevention given to patient. See Patient Education activity for further details. PROCEDURE DETAIL A triple lumen power injectable PICC line was started for reliable vascular access. The following documentation is in addition to the PICC properties in the lines/airways flowsheet : 
Lot #: [id number] Xylocaine 1% used intradermally:  yes Total Catheter Length: 47 (cm) External Catheter Length: 1 (cm) Circumference: 27 (cm) Vein Selection for PICC: right basilic Central Line Bundle followed: yes Complication Related to Insertion: none The placement was verified by ECG technology. The PICC is on the right side and the tip overlies the superior vena cava. ECG verification documentation is on the patient's paper chart. Line is okay to use. Report to Rosemary. Crystal Rai, PAULAN, RN, [place]-BC  Vascular Access Team

## 2019-11-22 NOTE — PROGRESS NOTES
Cardiac Surgery Care Coordinator- Met with Marta Ovalles, reviewed plan of care and encouraged Mr Cole Felix to verbalize. Reinforced sternal precautions and encouraged continued use of the incentive spirometer. Reviewed goals for the day and emphasized the importance of increased activity to meet discharge goals. Will continue to follow for educational and emotional needs.  Adrian Arreola RN

## 2019-11-22 NOTE — PROGRESS NOTES
[facility] Progress Note DOS:   11/22/2019 NAME:  Milagro Holley MRN:   [id number] REFERRING PROVIDER:  Dr. Trace Schmidt PRIMARY CARE PHYSICIAN: Christian Calixto MD 
 
 
Chief Complaint:  
Cardiogenic Shock HPI: Sydni Kiser is a 71y.o. year old pleasant white male with a history of HTN, HLD, JOSE, CAD s/p cardiac arrest VFib s/p CABG (2011) c/b sternal would infection and sternectomy, ischemic cardiomyopathy LVEF 15-20%, s/p ICD and with LBBB. He was admitted with acute on chronic systolic heart failure with massive volume overload > 20 lbs, in the setting of atrial fibrillation s/p failed DCCV and underwent DCCV and RHC on 6/18.  S/p BiVICD on 6/21/19 with Dr. Pat Ricardo. He was discharged home home on IV milrinone on 6/26/19. [facility] has been followed closely by Dr. Trace Schmidt and the [facility]. 
  
Mr. Kiser was admitted for acute on chronic systolic heart failure. He underwent implantation of Impella 5.0 due to CS and has completed his LVAD evaluation. He meets criteria for implant of HM3 as DT. He was NYHA Class IV prior to implant of Impella 5.0, has LVEF < 15%, was intolerant of GDMT due to symptomatic hypotension and renal dysfunction. He remains dependent on temporary MCS support. RHC  revealed compensated hemodynamics on Impella. His renal function has improved dramatically with improvement in his hemodynamics. He is not a suitable transplant candidate due to single kidney, sternectomy, debility, and frailty. He was reviewed by [facility] and felt to be a high risk heart transplant candidate due to multiple co-morbidities as well as the afore mentioned conditions. He remained in the CCU and underwent a HM 3 implant as DT on 11/18. 24Hr Events Negative fluid balance Increased BLL edema Working with PT/OT Procedure:  Procedure(s): REMOVAL TEMPORARY LEFT VENTRICULAR ASSIST DEVICE, IMPLANTATION OF LEFT VENTRICULAR ASSIST DEVICE PERMANENT (VAD), ECC, JACQUE, EPI AORTIC US BY  WOOD.    
POD:  4 Impression / Plan:  
Heart Failure Status: NYHA Class IV INTERMACS Category 2 Chronic systolic heart failure due to ICM, NYHA Class IV, EF < 15%, cardiogenic shock on Impella 5.0 support S/p Impella removal and LVAD implant Goal CVP < 15 mmHG Cont milrinone 0.375mcg/kg/min for now Off dobutamine Start Sildenafil today Bumex today Resume Coreg over the weekend No AA/ARB/ARNI post op- will start as appropriate Anticoagulation for LVAD, INR goal 2-3 Hold ASA for platelets Cont coumadin tonight 2mg Cont Bivalrudin until INR > 2 
  
Acute Respiratory Failure post op Resolved Pulmonary hygiene Wean NC for sats > 92% Cont home CPAP - wife bringing back up mask Post op Pain PRN morphine- will assess management daily Comfort measures L UE pain/weakness Appreciate neurology recommendations Pain improved Vascular following- waiting on arterial US 
 
CKD 3, atrophic left kidney Appreciate Nephrology assistance Assess diuretic dosing daily 2mg Bumex TID Avoid nephrotoxins Trend labs  
  
CAD s/p CABG Resume low dose ASA- watch platelets No BB while on dobutamine Hold statin due to recent hepatic failure LHC performed 11/13 - low likelihood of benefit from revascularization  
  
Hx of VF arrest s/p BiV-ICD No recent shocks Keep K > 4 and Mg > 2  
   
PAF Currently in NSR, Paced Cont PO Amio 
  
Urinary retention, c/b serratia UTI and hematuria Appreciate Urology consult Cystoscopy performed 11/13 shows catheter induced cystitis  
  
Malnutrition Appreciate Nutritionist consult Prealbumin improved to 19 Cont Marinol   
  
COPD Appreciate Pulmonology assistance Continue nebs PRN   
PFTs complete 
  
Anemia Multifactorial, due to hemolysis + epistaxis + hematuria Intolerant of octreotide or doxycycline for AVM ppx due to prolonged QTc Transfuse for Hgb goal > 8.5 prior to OR Hgb stable - monitor   
  
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  
  
Hx of sternal wound infection, sternectomy Dr. Fany Heart has reviewed CTs Sternum closed post op- monitor  
  
Vocal cord paralysis Improved Speech therapy following May need MBS Debility Continue PT/OT  
  
Ppx Protonix PT/OT when able Post op antibiotics per routine Will resume Ancef for 2 weeks post op per Dr. Fany Heart for Impella prophylaxis Bowel regimen Cont Mechanical soft PICC team 
DC pa cath Dispo: 
Remain in CV[facility] for now, hopefully transfer tomorrow to [facility] Patient seen and examined. Data and note reviewed. I have discussed and agree with the plans as noted. 76year old male with a history of CAD, s/p CABG, ICM who presented with acute on chronic systolic heart failure and underwent LVAD as DT. His post op course has been complicated by left median neuropathy. Vascular surgery found a thrombus in his left brachial artery which would benefit from thrombectomy. Plan thrombectomy next week - continue IV bivalrudin. Diurese as tolerated. Start sildenafil for pulmonary HTN. Thank you for allowing us to participate in your patient's care. Mounika Watson MD, [facility] Chief of Cardiology, [facility] Medical Director [address] [place] [address], [address] Office [phone] Fax [phone] History: 
Past Medical History:  
Diagnosis Date  Degenerative disc disease, lumbar  Heart failure ([address]  High cholesterol  Hypertension  Paroxysmal atrial fibrillation ([address] 4/2/2019  Spinal stenosis Past Surgical History:  
Procedure Laterality Date  COLONOSCOPY Left 11/11/2019 COLONOSCOPY at bedside performed by Parris Burch MD at [address] [facility] [address]  HX CORONARY ARTERY BYPASS GRAFT    
 triple  HX HERNIA REPAIR    
 HX IMPLANTABLE CARDIOVERTER DEFIBRILLATOR    
  NH CARDIOVERSION ELECTIVE ARRHYTHMIA EXTERNAL N/A 6/10/2019 EP CARDIOVERSION performed by Shiv Young MD at [address], [facility] Dr CATH LAB  NH CARDIOVERSION ELECTIVE ARRHYTHMIA EXTERNAL N/A 2019 EP CARDIOVERSION performed by Omkar Perez MD at [address], [facility] Dr CATH LAB  NH INSJ/RPLCMT PERM DFB W/TRNSVNS LDS 1/DUAL CHMBR N/A 2019 INSERT ICD BIV MULTI performed by Zachary Francis MD at [address], [facility] Dr CATH LAB  NH TCAT IMPL WRLS P-ART PRS SNR L-T HEMODYN MNTR N/A 2019 IMPLANT HEART FAILURE MONITORING DEVICE performed by Darby Ordaz MD at [address], [facility] Dr CATH LAB Social History Socioeconomic History  Marital status:  Spouse name: Not on file  Number of children: Not on file  Years of education: Not on file  Highest education level: Not on file Occupational History  Not on file Social Needs  Financial resource strain: Not on file  Food insecurity:  
  Worry: Not on file Inability: Not on file  Transportation needs:  
  Medical: Not on file Non-medical: Not on file Tobacco Use  Smoking status: Former Smoker Last attempt to quit: 2010 Years since quittin.9  Smokeless tobacco: Never Used Substance and Sexual Activity  Alcohol use: Not Currently Comment: rarely  Drug use: Never  Sexual activity: Not on file Lifestyle  Physical activity:  
  Days per week: Not on file Minutes per session: Not on file  Stress: Not on file Relationships  Social connections:  
  Talks on phone: Not on file Gets together: Not on file Attends Rastafari service: Not on file Active member of club or organization: Not on file Attends meetings of clubs or organizations: Not on file Relationship status: Not on file  Intimate partner violence:  
  Fear of current or ex partner: Not on file Emotionally abused: Not on file Physically abused: Not on file Forced sexual activity: Not on file Other Topics Concern [address] Service Not Asked  Blood Transfusions Not Asked  Caffeine Concern Not Asked  Occupational Exposure Not Asked Kye Peeling Hazards Not Asked  Sleep Concern Not Asked  Stress Concern Not Asked  Weight Concern Not Asked  Special Diet Not Asked  Back Care Not Asked  Exercise Not Asked  Bike Helmet Not Asked  Seat Belt Not Asked  Self-Exams Not Asked Social History Narrative  Not on file Family History Problem Relation Age of Onset  Lung Disease Mother  Hypertension Mother Aundria Dadds Arthritis-osteo Mother  Heart Disease Mother  Heart Disease Father  Diabetes Father Current Medications:  
Current Facility-Administered Medications Medication Dose Route Frequency Provider Last Rate Last Dose  tamsulosin (FLOMAX) capsule 0.4 mg  0.4 mg Oral DAILY Logan Kincaid MD   0.4 mg at 11/22/19 3758  aspirin chewable tablet 81 mg  81 mg Oral DAILY Levi, Shana B, NP   81 mg at 11/22/19 8808  bumetanide (BUMEX) injection 2 mg  2 mg IntraVENous TID Zunilda Puja B, NP      
 sildenafil (antihypertensive) (REVATIO) tablet 20 mg  20 mg Oral TID Zunilda Curd B, NP   20 mg at 11/22/19 1043  potassium chloride 20 mEq in 50 ml IVPB  20 mEq IntraVENous ONCE Sandra Cuenca MD 25 mL/hr at 11/22/19 1032 20 mEq at 11/22/19 1032  warfarin (COUMADIN) tablet 2 mg  2 mg Oral ONCE Levi, Shana B, NP      
 amiodarone (CORDARONE) tablet 400 mg  400 mg Oral DAILY Levi, Shana B, NP   400 mg at 11/22/19 0156  pantoprazole (PROTONIX) tablet 40 mg  40 mg Oral ACB Levi, Shana B, NP   40 mg at 11/22/19 0650  
 insulin lispro (HUMALOG) injection   SubCUTAneous TIDAC Levi, Shana B, NP   Stopped at 11/21/19 1630  
 insulin lispro (HUMALOG) injection   SubCUTAneous AC&HS Zunilda Curd B, NP   Stopped at 11/20/19 2200  Warfarin MD/NP dosing   Other PRN Amanda Altman MD      
 epoetin yvonne-epbx (RETACRIT) injection 20,000 Units  20,000 Units SubCUTAneous Q7D Logan Cam MD   20,000 Units at 11/19/19 0924  
 0.9% sodium chloride infusion 250 mL  250 mL IntraVENous PRN Abbe Angelo MD      
 ceFAZolin (ANCEF) 1 g in 0.9% sodium chloride (MBP/ADV) 50 mL  1 g IntraVENous Q8H Shana Sims  mL/hr at 11/22/19 0007 1 g at 11/22/19 0007  dronabinol (MARINOL) capsule 2.5 mg  2.5 mg Oral ACB&D Asherraissa BHAKTA NP   2.5 mg at 11/22/19 7298  bivalirudin (ANGIOMAX) 250 mg in 0.9% sodium chloride (MBP/ADV) 50 mL  0.02-2.5 mg/kg/hr IntraVENous TITRATE Shana Sims NP 4 mL/hr at 11/22/19 1038 0.2198 mg/kg/hr at 11/22/19 1038  lidocaine (LIDODERM) 5 % patch 1 Patch  1 Patch TransDERmal Q24H Shana Sims NP   1 Patch at 11/21/19 1845  
 0.9% sodium chloride infusion  9 mL/hr IntraVENous CONTINUOUS Josh Herrera NP 9 mL/hr at 11/20/19 1952 9 mL/hr at 11/20/19 1952  
 0.45% sodium chloride infusion  10 mL/hr IntraVENous CONTINUOUS Josh Herrera NP 10 mL/hr at 11/22/19 0800 10 mL/hr at 11/22/19 0800  
 sodium chloride (NS) flush 5-40 mL  5-40 mL IntraVENous Q8H Josh Herrera NP   Stopped at 11/22/19 0600  
 sodium chloride (NS) flush 5-40 mL  5-40 mL IntraVENous PRN Josh Herrera NP      
 acetaminophen (TYLENOL) tablet 650 mg  650 mg Oral Q6H PRN Josh Herrera NP   650 mg at 11/21/19 1317  
 oxyCODONE IR (ROXICODONE) tablet 5 mg  5 mg Oral Q4H PRN Josh Herrera NP   5 mg at 11/19/19 2207  oxyCODONE IR (ROXICODONE) tablet 10 mg  10 mg Oral Q4H PRN Josh Herrera NP   10 mg at 11/22/19 1044  
 naloxone [facility]) injection 0.4 mg  0.4 mg IntraVENous PRN Josh Herrera NP      
 mupirocin (BACTROBAN) 2 % ointment   Both Nostrils BID Josh Herrera NP      
 ondansetron [facility]) injection 4 mg  4 mg IntraVENous Q4H PRN Sharon, Mariela Csota NP   4 mg at 11/20/19 2000  
 albuterol-ipratropium (DUO-NEB) 2.5 MG-0.5 MG/3 ML  3 mL Nebulization Q6H PRN Mariela Herrera NP      
 chlorhexidine (PERIDEX) 0.12 % mouthwash 10 mL  10 mL Oral BID Mariela Herrera NP   10 mL at 11/22/19 4073  senna-docusate (PERICOLACE) 8.6-50 mg per tablet 1 Tab  1 Tab Oral DAILY Mariela Herrera NP   Stopped at 11/22/19 0900  polyethylene glycol (MIRALAX) packet 17 g  17 g Oral DAILY Mariela Herrera NP   17 g at 11/22/19 9356  bisacodyl (DULCOLAX) tablet 5 mg  5 mg Oral DAILY PRN Mariela Herrera NP      
 sucralfate (CARAFATE) tablet 1 g  1 g Oral AC&HS Manuel Castaneda NP   1 g at 11/22/19 0650  
 0.9% sodium chloride infusion 250 mL  250 mL IntraVENous PRN Jeff Robles MD      
 influenza vaccine 2019-20 (6 mos+)(PF) (FLUARIX/FLULAVAL/FLUZONE QUAD) injection 0.5 mL  0.5 mL IntraMUSCular PRIOR TO DISCHARGE Marilee Altman MD      
 hydrALAZINE (APRESOLINE) 20 mg/mL injection 20 mg  20 mg IntraVENous Q6H PRN Shana Sims NP   20 mg at 11/19/19 0547  morphine injection 4 mg  4 mg IntraVENous Q2H PRN Shana Sims NP   4 mg at 11/22/19 0007  dextrose 10 % infusion 125-250 mL  125-250 mL IntraVENous PRN Mounika Altman MD   Stopped at 11/18/19 0700  
 milrinone (PRIMACOR) 20 MG/100 ML D5W infusion  0-0.75 mcg/kg/min IntraVENous TITRATE Genevieve BHAKTA NP 10 mL/hr at 11/22/19 0759 0.375 mcg/kg/min at 11/22/19 0759  DOBUTamine (DOBUTREX) 1,000 mg/250 mL (4,000 mcg/mL) infusion  0-10 mcg/kg/min IntraVENous TITRATE Mariela Herrera NP   Stopped at 11/21/19 1255  insulin regular (NOVOLIN R, HUMULIN R) 100 Units in 0.9% sodium chloride 100 mL infusion  1-50 Units/hr IntraVENous TITRATE Mariela Herrera NP   Stopped at 11/21/19 3420  ELECTROLYTE REPLACEMENT NOTE: Nurse to review Serum Potassium and Magnesuim levels and Initiate Electrolyte Replacement Protocol as needed 1 Each Other PRN Pema Cordial, NP Allergies: No Known Allergies ROS:   
Review of Systems Constitutional: Negative for chills, fever and malaise/fatigue. HENT: Positive for hearing loss. Respiratory: Negative for cough, sputum production and shortness of breath. Cardiovascular: Positive for leg swelling. Negative for chest pain. Gastrointestinal: Negative for heartburn, nausea and vomiting. Musculoskeletal: Negative for myalgias. Skin: Negative. Neurological: Positive for weakness. Negative for dizziness and headaches. Endo/Heme/Allergies: Negative. Psychiatric/Behavioral: Negative. Physical Exam:  
Physical Exam 
Vitals signs and nursing note reviewed. Constitutional:   
   Appearance: He is well-developed and well-nourished. HENT:  
   Head: Normocephalic. Eyes:  
   Pupils: Pupils are equal, round, and reactive to light. Neck: Musculoskeletal: Normal range of motion and neck supple. Vascular: No JVD. Cardiovascular:  
   Rate and Rhythm: Normal rate and regular rhythm. Heart sounds: Normal heart sounds. Comments: + VAD hum Pulmonary:  
   Effort: Pulmonary effort is normal.  
   Breath sounds: Normal breath sounds. Abdominal:  
   General: Bowel sounds are normal. There is no distension. Palpations: Abdomen is soft. Musculoskeletal: Normal range of motion. General: Edema present. Comments: R > L Skin: 
   General: Skin is warm and dry. Neurological:  
   Mental Status: He is alert and oriented to person, place, and time. Vitals:  
Visit Vitals BP 93/71 (BP 1 Location: Right arm, BP Patient Position: At rest) Pulse 88 Temp 99 °F (37.2 °C) Resp 20 Ht 6' 2\" (1.88 m) Wt 206 lb 9.6 oz (93.7 kg) SpO2 96% BMI 26.53 kg/m² Temp (24hrs), Av.8 °F (37.1 °C), Min:98.4 °F (36.9 °C), Max:99.2 °F (37.3 °C) Hemodynamics: 
 CO: CO (l/min): 6.4 l/min CI: CI (l/min/m2): 3 l/min/m2 CVP: CVP (mmHg): 0 mmHg (11/22/19 1100) SVR: SVR (dyne*sec)/cm5: 925 (dyne*sec)/cm5 (11/22/19 0400) PAP Systolic: PAP Systolic: 22 (71/12/18 0573) PAP Diastolic: PAP Diastolic: 9 (71/51/47 3309) PVR:   
 SV02: SVO2 (%): 39 % (11/22/19 1000) SCV02: SCVO2 (%): 75 % (10/29/19 1900) Admission Weight: Last Weight Weight: 192 lb 10.9 oz (87.4 kg) Weight: 206 lb 9.6 oz (93.7 kg) Intake / Output / Drain: 
Last 24 hrs.:  
 
Intake/Output Summary (Last 24 hours) at 11/22/2019 1147 Last data filed at 11/22/2019 1000 Gross per 24 hour Intake 1582.68 ml Output 2285 ml Net -702.32 ml Drains:  
24h: 570 8h: 210 Extubation Date / Time:  11/19/19 at 1030am 
 
Oxygen Therapy: 
Oxygen Therapy O2 Sat (%): 96 % (11/22/19 0600) Pulse via Oximetry: 86 beats per minute (11/22/19 1100) O2 Device: Nasal cannula (11/22/19 0600) O2 Flow Rate (L/min): 6 l/min (11/22/19 0600) FIO2 (%): 50 % (11/19/19 1035) VAD Data: LVAD (Heartmate) Pump Speed (RPM): 5200 Pump Flow (LPM): 3.6 PI (Pulsitility Index): 5.6 Power: 3.6 MAP: 84  Test: No 
Back Up  at Bedside & Labeled: Yes Power Module Test: Yes Driveline Site Care: Yes Driveline Dressing: Clean, Dry, and Intact Drive Line Exam: 
Stabilization device intact: yes Line inspected for damage: yes Appearance: no edema, erythema, drainage Stabilization Method: anchor to abd Recent Labs:  
Labs Latest Ref Rng & Units 11/22/2019 11/21/2019 11/20/2019 11/19/2019 11/19/2019 11/18/2019 11/18/2019 WBC 4.1 - 11.1 K/uL 6.5 7.0 6.6 - 3. 9(L) - -  
RBC 4.10 - 5.70 M/uL 2.84(L) 2.80(L) 2.62(L) - 2.43(L) - - Hemoglobin 12.1 - 17.0 g/dL 8.7(L) 8.6(L) 8.0(L) 7. 9(L) 7. 3(L) - 8. 0(L) Hematocrit 36.6 - 50.3 % 28. 0(L) 27. 9(L) 25. 9(L) 25. 3(L) 23. 1(L) - 25. 2(L) MCV 80.0 - 99.0 FL 98.6 99. 6(H) 98.9 - 95.1 - - Platelets 447 - 507 K/uL 105(L) 105(L) 92(L) - 74(L) - - Lymphocytes 12 - 49 % - - - - 18 - -  
 Monocytes 5 - 13 % - - - - 9 - - Eosinophils 0 - 7 % - - - - 0 - - Basophils 0 - 1 % - - - - 1 - - Albumin 3.5 - 5.0 g/dL 2. 5(L) 2. 6(L) 2. 7(L) - 2. 7(L) 2. 6(L) -  
Calcium 8.5 - 10.1 MG/DL 8.4(L) 8.6 8.6 - 8.4(L) 8. 3(L) -  
SGOT 15 - 37 U/L 23 40(H) 60(H) - 52(H) 50(H) -  
Glucose 65 - 100 mg/dL 95 95 110(H) - 102(H) 106(H) -  
BUN 6 - 20 MG/DL 24(H) 23(H) 22(H) - 19 18 - Creatinine 0.70 - 1.30 MG/DL 1.20 1.36(H) 1.43(H) - 1.30 1.26 - Sodium 136 - 145 mmol/L 132(L) 133(L) 138 - 141 141 - Potassium 3.5 - 5.1 mmol/L 3.5 4.0 3.9 3.8 3.9 4.3 -  
TSH 0.36 - 3.74 uIU/mL - - - - - - -  
PSA 0.01 - 4.0 ng/mL - - - - - - -  
LDH 85 - 241 U/L 389(H) 460(H) 496(H) - 491(H) - -  
CEA ng/mL - - - - - - - Some recent data might be hidden EKG:  
EKG Results Procedure 720 Value Units Date/Time EKG, 12 LEAD, INITIAL [[id number]] Collected:  11/19/19 0417 Order Status:  Completed Updated:  11/19/19 1224 Ventricular Rate 76 BPM   
  Atrial Rate 0 BPM   
  QRS Duration 156 ms   
  Q-T Interval 564 ms QTC Calculation (Bezet) 634 ms Calculated R Axis 81 degrees Calculated T Axis 120 degrees Diagnosis -- Electronic ventricular pacemaker Baseline artifact When compared with ECG of 13-NOV-2019 04:40, 
Vent. rate has increased BY   2 BPM 
Confirmed by Abhilash Stein M.D., Laird Bally ([id number]) on 11/19/2019 12:23:52 PM 
  
 EKG, 12 LEAD, INITIAL [[id number]] Order Status:  Canceled EKG, 12 LEAD, INITIAL [[id number]] Order Status:  Canceled EKG, 12 LEAD, INITIAL [[id number]] Order Status:  Canceled EKG, 12 LEAD, INITIAL [[id number]] Order Status:  Canceled EKG, 12 LEAD, INITIAL [[id number]] Order Status:  Canceled EKG, 12 LEAD, INITIAL [[id number]] Collected:  11/13/19 0440 Order Status:  Completed Updated:  11/13/19 2200 Ventricular Rate 74 BPM   
  Atrial Rate 66 BPM   
  QRS Duration 166 ms   
  Q-T Interval 488 ms QTC Calculation (Bezet) 541 ms Calculated R Axis -15 degrees Calculated T Axis 157 degrees Diagnosis -- Electronic ventricular pacemaker When compared with ECG of 11-NOV-2019 04:49, No significant change was found Confirmed by Derrell Molina M.D., Debe Drop ([id number]) on 11/13/2019 10:00:38 PM 
  
 EKG, 12 LEAD, INITIAL [[id number]] Order Status:  Canceled EKG, 12 LEAD, INITIAL [[id number]] Collected:  11/11/19 0449 Order Status:  Completed Updated:  11/11/19 3239 Ventricular Rate 74 BPM   
  Atrial Rate 39 BPM   
  QRS Duration 158 ms Q-T Interval 504 ms QTC Calculation (Bezet) 559 ms Calculated R Axis 13 degrees Calculated T Axis 175 degrees Diagnosis -- Electronic ventricular pacemaker When compared with ECG of 05-NOV-2019 10:44, 
Vent. rate has decreased BY   9 BPM 
Confirmed by Derrell Molina M.D., Debe Drop ([id number]) on 11/11/2019 8:54:27 AM 
  
 EKG, 12 LEAD, INITIAL [[id number]] Order Status:  Canceled EKG, 12 LEAD, INITIAL [[id number]] Order Status:  Canceled EKG, 12 LEAD, INITIAL [[id number]] Order Status:  Canceled EKG, 12 LEAD, INITIAL [[id number]] Collected:  11/05/19 1044 Order Status:  Completed Updated:  11/05/19 1226 Ventricular Rate 83 BPM   
  Atrial Rate 83 BPM   
  P-R Interval 80 ms QRS Duration 162 ms Q-T Interval 502 ms QTC Calculation (Bezet) 589 ms Calculated R Axis 2 degrees Calculated T Axis -156 degrees Diagnosis -- Electronic ventricular pacemaker Marked T wave abnormality, consider  
anterolateral ischemia When compared with ECG of 02-NOV-2019 10:42, No significant change was found Confirmed by Michelle Christianson M.D., Amilcar Manning ([id number]) on 11/5/2019 12:25:52 PM 
  
 EKG, 12 LEAD, INITIAL [[id number]] Collected:  11/02/19 1042 Order Status:  Completed Updated:  11/03/19 6006 Ventricular Rate 91 BPM   
  Atrial Rate 91 BPM   
  P-R Interval 108 ms QRS Duration 148 ms Q-T Interval 524 ms QTC Calculation (Bezet) 644 ms Calculated R Axis 5 degrees Calculated T Axis -152 degrees Diagnosis --  
  Sinus rhythm with short NC with occasional premature ventricular complexes  
and fusion complexes Nonspecific intraventricular block Marked T wave abnormality, consider anterolateral ischemia When compared with ECG of 26-OCT-2019 06:55, Sinus rhythm has replaced Electronic ventricular pacemaker Prolonged QT Confirmed by Shabbir Shah ([id number]) on 11/3/2019 6:35:23 AM 
  
 EKG, 12 LEAD, INITIAL [[id number]] Order Status:  Canceled EKG, 12 LEAD, INITIAL [[id number]] Collected:  10/26/19 4664 Order Status:  Completed Updated:  10/26/19 1807 Ventricular Rate 80 BPM   
  Atrial Rate 76 BPM   
  QRS Duration 174 ms Q-T Interval 462 ms QTC Calculation (Bezet) 532 ms Calculated R Axis 28 degrees Calculated T Axis 150 degrees Diagnosis -- Atrial flutter Left bundle branch block When compared with ECG of 25-OCT-2019 18:20, 
Electronic demand pacing is not noted Confirmed by Erik Steel ([id number]) on 10/26/2019 6:07:36 PM 
  
 EKG, 12 LEAD, INITIAL [[id number]] Collected:  10/25/19 1820 Order Status:  Completed Updated:  10/26/19 1749 Ventricular Rate 72 BPM   
  Atrial Rate 72 BPM   
  P-R Interval 86 ms QRS Duration 116 ms   
  Q-T Interval 506 ms QTC Calculation (Bezet) 554 ms Calculated P Axis 9 degrees Calculated R Axis -68 degrees Calculated T Axis 10 degrees Diagnosis --  
  undetermied rhythm possible atrial flutter Nonspecific intraventricular conduction delay Ventricular paced rhythm When compared with ECG of 26-JUN-2019 11:39, 
Significant changes have occurred Confirmed by Erik Steel ([id number]) on 10/26/2019 5:49:02 PM 
  
  
No specialty comments available. 10/25/19 OR ECHO JACQUE INTRAOP  11/18/2019 Narrative See Anesthesia Procedure note for procedure details. Signed by: Echo Results  (Last 48 hours) None CXR Results  (Last 48 hours)  
          
 11/22/19 0510  XR CHEST PORT Final result Impression:  IMPRESSION:  
1. Decrease in pulmonary edema 2. No change left lower lobe atelectasis. The aeration has improved in the right  
lower lobe. Narrative:  EXAM: XR CHEST PORT INDICATION: post LVAD implant COMPARISON: 11/21/2019 FINDINGS: A portable AP radiograph of the chest was obtained at 0339 hours. The  
patient is on a cardiac monitor. The right IJ catheter, [place] catheter, ICD  
and LVAD remain in place. Mediastinal drains and pleural drains are noted. Small pleural effusions are present. There is bibasilar atelectasis left greater  
than right which has improved at the right lung base. Mild pulmonary edema has decreased. 11/21/19 0443  XR CHEST PORT Final result Impression:  IMPRESSION: Continued evidence of congestive cardiac failure/pulmonary edema. Narrative:  Portable chest single view dated 11/21/2019 Comparison chest dated 11/20/2019 History is post LVAD) graft a single frontal view of the chest was obtained. The  
cardiac silhouette continues to be enlarged. There continues to be evidence of  
congestive change/pulmonary edema. Veiling opacity is noted at both lung bases. This is compatible with the presence of bilateral pleural effusions. The  
[place] catheter and the right IJ catheter are again noted and are unchanged  
in position. The previously described gaseous distention of the stomach is not  
evident on the current examination. TIERRA Aponte [address], [address] [place] [address] Office [phone] Fax [phone] 
24 hour VAD/HF Pager: [phone]

## 2019-11-22 NOTE — PROGRESS NOTES
Vascular: 
 
Duplex shows occlusion left brachial artery. Patient needs brachial thrombectomy. Discussed with Dr Maria Del Rosario Magana. Will plan procedure for Monday to allow more time for pulm situation to improve. Discussed with patient and his wife who agree with plan. [facility] available over weekend if needed.

## 2019-11-23 NOTE — PROGRESS NOTES
Problem: Falls - Risk of 
Goal: *Absence of Falls Description Document Teofilo Morales Fall Risk and appropriate interventions in the flowsheet. Outcome: Progressing Towards Goal 
Note: Fall Risk Interventions: 
Mobility Interventions: Communicate number of staff needed for ambulation/transfer Mentation Interventions: Adequate sleep, hydration, pain control Medication Interventions: Evaluate medications/consider consulting pharmacy Elimination Interventions: Patient to call for help with toileting needs History of Falls Interventions: Consult care management for discharge planning Problem: Pain Goal: *Control of Pain Outcome: Progressing Towards Goal 
  
Problem: Pressure Injury - Risk of 
Goal: *Prevention of pressure injury Description Document Mich Scale and appropriate interventions in the flowsheet. Outcome: Progressing Towards Goal 
Note: Pressure Injury Interventions: 
Sensory Interventions: Assess need for specialty bed, Discuss PT/OT consult with provider Moisture Interventions: Check for incontinence Q2 hours and as needed Activity Interventions: Pressure redistribution bed/mattress(bed type) Mobility Interventions: Pressure redistribution bed/mattress (bed type), PT/OT evaluation Nutrition Interventions: Document food/fluid/supplement intake Friction and Shear Interventions: Lift sheet, Minimize layers Problem: Infection - Risk of, Multi-drug Resistant Organism Colonization (MDRO) Goal: *Absence of MDRO colonization Outcome: Progressing Towards Goal 
Goal: *Absence of infection signs and symptoms Outcome: Progressing Towards Goal 
  
Problem: Heart Failure: Discharge Outcomes Goal: *Demonstrates ability to perform prescribed activity without shortness of breath or discomfort Outcome: Progressing Towards Goal 
Goal: *Left ventricular function assessment completed prior to or during stay, or planned for post-discharge Outcome: Progressing Towards Goal 
Goal: *Verbalizes understanding/describes prescribed medications Outcome: Progressing Towards Goal 
  
Problem: Diabetes Self-Management Goal: *Disease process and treatment process Description Define diabetes and identify own type of diabetes; list 3 options for treating diabetes. Outcome: Progressing Towards Goal 
Goal: *Incorporating nutritional management into lifestyle Description Describe effect of type, amount and timing of food on blood glucose; list 3 methods for planning meals. Outcome: Progressing Towards Goal 
Goal: *Incorporating physical activity into lifestyle Description State effect of exercise on blood glucose levels. Outcome: Progressing Towards Goal 
Goal: *Developing strategies to promote health/change behavior Description Define the ABC's of diabetes; identify appropriate screenings, schedule and personal plan for screenings. Outcome: Progressing Towards Goal 
Goal: *Using medications safely Description State effect of diabetes medications on diabetes; name diabetes medication taking, action and side effects. Outcome: Progressing Towards Goal 
Goal: *Monitoring blood glucose, interpreting and using results Description Identify recommended blood glucose targets  and personal targets. Outcome: Progressing Towards Goal 
  
Problem: Discharge Planning Goal: *Discharge to safe environment Outcome: Progressing Towards Goal 
  
Problem: Nutrition Deficit Goal: *Optimize nutritional status Outcome: Progressing Towards Goal 
  
Problem: Nutrition Deficit Goal: *Optimize nutritional status Outcome: Progressing Towards Goal 
  
Problem: Nutrition Deficit Goal: *Optimize nutritional status Outcome: Progressing Towards Goal 
  
Problem: Infection - Risk of, Urinary Catheter-Associated Urinary Tract Infection Goal: *Absence of infection signs and symptoms Outcome: Progressing Towards Goal 
  
Problem: LVAD Post-op Day 4/Transfer Day Goal: Off Pathway (Use only if patient is Off Pathway) Outcome: Progressing Towards Goal 
Note:  
Continues to need Milrinone and ICU care. Will progress as MD orders

## 2019-11-23 NOTE — PROGRESS NOTES
2000- Bedside and Verbal shift change report given to [facility] (oncoming nurse) by Hesham RN (offgoing nurse). Report included the following information SBAR, Procedure Summary, Intake/Output, Recent Results and Cardiac Rhythm Paced. 0000- Pt reassessed. No change. 0330- Pt up to bedside commode. Passed medium sized mucous bm. 
 
0400- Pt reassessed. No change. Labs sent. 0500- Labs resulted. Mg 1.9 and K 3.2. 1gm of Magnesium ordered as well as 3 runs of K. All through PICC line. 0800- Bedside and Verbal shift change report given to Rosemary RN (oncoming nurse) by [facility] RN (offgoing nurse). Report included the following information SBAR, Procedure Summary, Intake/Output, Recent Results and Cardiac Rhythm Paced.

## 2019-11-23 NOTE — PROGRESS NOTES
ID Progress Note 
2019 Subjective:  
 
Remains off vent, looks good Objective: Antibiotics: 1. Levaquin 2. Rifampin 3. Fluconazole 4. Vancomycin 5. Cefazolin Vitals:  
Visit Vitals BP 93/71 (BP 1 Location: Right arm, BP Patient Position: At rest) Comment: map 66 Pulse (!) 102 Temp 97.7 °F (36.5 °C) Resp 29 Ht 6' 2\" (1.88 m) Wt 93.4 kg (206 lb) SpO2 98% BMI 26.45 kg/m² Tmax:  Temp (24hrs), Av °F (36.7 °C), Min:97.1 °F (36.2 °C), Max:98.8 °F (37.1 °C) Exam:  Lungs:  clear to auscultation bilaterally Heart:  regular rate and rhythm Abdomen:  soft, non-tender. Bowel sounds normal. No masses,  no organomegaly Urine is clearing up Labs:     
Recent Labs  
  19 
0409 19 
0403 19 
0413 WBC 6.9 6.5 7.0 HGB 9.3* 8.7* 8.6*  
* 105* 105* BUN 22* 24* 23* CREA 1.05 1.20 1.36* SGOT 18 23 40* AP 93 90 89 TBILI 2.2* 2.9* 5.5* Cultures: No results found for: [facility] [place] Lab Results Component Value Date/Time Culture result: NO GROWTH 5 DAYS 2019 11:18 AM  
 Culture result: SERRATIA MARCESCENS (A) 10/31/2019 10:05 AM  
 Culture result: SERRATIA MARCESCENS (2ND COLONY TYPE/STRAIN) (A) 10/31/2019 10:05 AM  
 Culture result: ENTEROCOCCUS FAECALIS GROUP D (A) 10/31/2019 10:05 AM  
 
 
Radiology:  
 
Line/Insert Date:        
 
Assessment:  
 
1. UTI--Serratia (2 biotypes) from culture and small amount of Enterococcus 2. CHF/cardiomyopathy Objective: 1. Continue current therapy 2. LVAD Camacho Lazcano MD

## 2019-11-23 NOTE — PROGRESS NOTES
1000: decrease milrinone to 0.3 mcg 
 
1500: wife observed dressing change with RN and practiced putting on sterile gloves. She also practiced battery changes Bedside shift change report given to Alysha WILKES (oncoming nurse) by Nathan Pereyra RN (offgoing nurse). Report included the following information SBAR, Intake/Output, MAR and Recent Results.

## 2019-11-23 NOTE — PROGRESS NOTES
Problem: Mobility Impaired (Adult and Pediatric) Goal: *Acute Goals and Plan of Care (Insert Text) Description FUNCTIONAL STATUS PRIOR TO ADMISSION: Patient was modified independent using a single point cane for functional mobility. Patient reports an increasingly sedentary lifestyle 2* fatigue and SOB/dyspnea. Retired (so is his wife). Patient reports x 3 falls within the last couple of weeks. Patient is wearing either nasal cannula or CPAP at night. LVAD work-up has been initiated. HOME SUPPORT PRIOR TO ADMISSION: The patient lived with his wife, but did not require physical assistance. Physical Therapy Goals: 
 
Re-evaluation completed 11/20/2019 and new goals formulated following LVAD implantation. 1.  Patient will move from supine to sit and sit to supine, scoot up and down and roll side to side in bed with minimal assistance/contact guard assist within 7 days. 2.  Patient will perform sit to/from stand with minimal assistance/contact guard assist within 7 days. 3.  Patient will ambulate 150 feet with least restrictive assistive device and minimal assistance/contact guard assist within 7 days. 4.  Patient will ascend/descend 4 stairs with handrail(s) with minimal assistance/contact guard assist within 7 days. 5.  Patient will perform cardiac exercises per protocol with supervision within 7 days. 6.  Patient will verbally and functionally recall 3/3 sternal precautions within 7 days. 7.  Patient will perform power exchange for power module to/from battery with moderate assistance  within 7 days. Initiated 10/27/2019; updated 11/15/2019 1. Patient will move from supine to sit and sit to supine, scoot up and down and roll side to side in bed with independence within 7 days. 2.  Patient will perform sit to/from stand with supervision/set-up within 7 days. 3.  Patient will ambulate 200 feet with least restrictive assistive device and supervision/set-up within 7 days. 4.  Patient will ascend/descend 4 stairs with  handrail(s) with supervision/set-up within 7 days for functional strengthening and community reintegration. Malena Rm 5.  Patient will verbally and functionally recall 3/3 sternal precautions within 7 days in preparation for LVAD implantation. 6.  Patient will perform a mock power exchange for power module to/from battery with supervision/set-up within 7 days in preparation for LVAD implantation. 1.  Patient will move from supine to sit and sit to supine, scoot up and down and roll side to side in bed with independence within 7 days. 2.  Patient will perform sit to/from stand with supervision/set-up within 7 days. 3.  Patient will ambulate 150 feet with least restrictive assistive device and supervision/set-up within 7 days. 4.  Patient will ascend/descend 4 stairs with  handrail(s) with supervision/set-up within 7 days for functional strengthening and community reintegration. Malena Rm 5.  Patient will verbally and functionally recall 3/3 sternal precautions within 7 days in preparation for LVAD implantation. 6.  Patient will perform a mock power exchange for power module to/from battery with supervision/set-up within 7 days in preparation for LVAD implantation. Outcome: Progressing Towards Goal 
 PHYSICAL THERAPY TREATMENT Patient: Ciro Bradford (75 y.o. male) Date: 11/23/2019 Diagnosis: [facility] [I50.9] <principal problem not specified> Procedure(s) (LRB): REMOVAL TEMPORARY LEFT VENTRICULAR ASSIST DEVICE, IMPLANTATION OF LEFT VENTRICULAR ASSIST DEVICE PERMANENT (VAD), ECC, JACQUE, EPI AORTIC US BY DR Reid Sellers. (Left) 5 Days Post-Op Precautions: Fall, Sternal 
Chart, physical therapy assessment, plan of care and goals were reviewed. ASSESSMENT Patient continues with skilled PT services and is progressing towards goals. Patient received sitting on bedside commode.  He was able to stand with 2 mod assists and ambulate 3 feet to chair with mod assist of 2 and then continue to stand for 1 minute performing weight shift activities. He continues to have difficulty maintaining center of gravity over his base of support resulting in a posterior lean. This is worse with static standing vs ambulation. After a seated rest he was able to stand from chair with 2 min assists and ambulate short distance again maintaining standing for 1 minute. Current Level of Function Impacting Discharge (mobility/balance): Overall mod assist of 2 for transfers and ambulation. Other factors to consider for discharge: New LVAD PLAN : 
Patient continues to benefit from skilled intervention to address the above impairments. Continue treatment per established plan of care. to address goals. Recommendation for discharge: (in order for the patient to meet his/her long term goals) Therapy 3 hours per day 5-7 days per week This discharge recommendation: A follow-up discussion with the attending provider and/or case management is planned IF patient discharges home will need the following DME: will continue to assess SUBJECTIVE:  
Patient stated I am tired.  OBJECTIVE DATA SUMMARY:  
Critical Behavior: 
Neurologic State: Appropriate for age Orientation Level: Oriented X4 Cognition: Appropriate for age attention/concentration Safety/Judgement: Decreased insight into deficits Functional Mobility Training: 
 
   
Transfers: 
Sit to Stand: Moderate assistance;Assist x2(from bedside commode, min assist of 2 from bedside chair) Stand to Sit: Minimum assistance;Assist x2 Balance: 
Sitting: Intact; With support Standing: Impaired; With support(posterior lean) Standing - Static: Poor Standing - Dynamic : Poor Ambulation/Gait Training: 
Distance (ft): 3 Feet (ft)(from bedside commode to chair, another 3 feet forward/backward after seated rest) Assistive Device: Gait belt Ambulation - Level of Assistance: Moderate assistance;Assist x2 Gait Abnormalities: Decreased step clearance;Trunk sway increased; Other(posterior lean) Therapeutic Exercises:  
Scapula elevation x 5 reps Sitting ankle pumps and LAQ x 10 reps Pain Rating: No complaint Activity Tolerance:  
Good Please refer to the flowsheet for vital signs taken during this treatment. After treatment patient left in no apparent distress:  
Sitting in chair, Heels elevated for pressure relief, and Call bell within reach COMMUNICATION/COLLABORATION:  
The patients plan of care was discussed with: Registered Nurse Billy Ganser, PT Time Calculation: 14 mins

## 2019-11-23 NOTE — PROGRESS NOTES
[facility] Progress Note DOS:   11/23/2019 NAME:  Sarah Bass MRN:   [id number] REFERRING PROVIDER:  Dr. Manish Law PRIMARY CARE PHYSICIAN: Kendal Wood MD 
 
 
Chief Complaint:  
Cardiogenic Shock HPI: Janell Kiser is a 71y.o. year old pleasant white male with a history of HTN, HLD, JOSE, CAD s/p cardiac arrest VFib s/p CABG (2011) c/b sternal would infection and sternectomy, ischemic cardiomyopathy LVEF 15-20%, s/p ICD and with LBBB. He was admitted with acute on chronic systolic heart failure with massive volume overload > 20 lbs, in the setting of atrial fibrillation s/p failed DCCV and underwent DCCV and RHC on 6/18.  S/p BiVICD on 6/21/19 with Dr. Isael Kline. He was discharged home home on IV milrinone on 6/26/19. Vivian Ovalles has been followed closely by Dr. Manish Law and the [facility]. 
  
Mr. Kiser was admitted for acute on chronic systolic heart failure. He underwent implantation of Impella 5.0 due to CS and has completed his LVAD evaluation. He meets criteria for implant of HM3 as DT. He was NYHA Class IV prior to implant of Impella 5.0, has LVEF < 15%, was intolerant of GDMT due to symptomatic hypotension and renal dysfunction. He remains dependent on temporary MCS support. RHC  revealed compensated hemodynamics on Impella. His renal function has improved dramatically with improvement in his hemodynamics. He is not a suitable transplant candidate due to single kidney, sternectomy, debility, and frailty. He was reviewed by [facility] and felt to be a high risk heart transplant candidate due to multiple co-morbidities as well as the afore mentioned conditions. He remained in the CCU and underwent a HM 3 implant as DT on 11/18. 24Hr Events Negative fluid balance Increased BLL edema Working with PT/OT Procedure:  Procedure(s): REMOVAL TEMPORARY LEFT VENTRICULAR ASSIST DEVICE, IMPLANTATION OF LEFT VENTRICULAR ASSIST DEVICE PERMANENT (VAD), ECC, JACQUE, EPI AORTIC US BY  WOOD.    
POD:  5 Impression / Plan:  
Heart Failure Status: NYHA Class IV INTERMACS Category 2 Chronic systolic heart failure due to ICM, NYHA Class IV, EF < 15%, cardiogenic shock on Impella 5.0 support S/p Impella removal and LVAD implant Goal CVP < 15 mmHG Wean IV milrinone to 0.3 mcg/kg/min Start Sildenafil today Increase bumex to 3 mg TID No AA/ARB/ARNI post op- will start as appropriate Anticoagulation for LVAD, INR goal 2-3 Hold ASA for platelets INR 1.8 Cont coumadin tonight 2mg Cont Bivalrudin until INR > 2 
  
Acute Respiratory Failure post op Resolved Pulmonary hygiene Wean NC for sats > 92% Cont home CPAP - wife bringing back up mask Post op Pain PRN morphine- will assess management daily Comfort measures L Brachial Artery Thrombosis On bivalrudin and warfarin Pain improved Vascular following Brachial artery thrombectomy planned for Monday 11/25 CKD 3, atrophic left kidney Appreciate Nephrology assistance Assess diuretic dosing daily 2mg Bumex TID Avoid nephrotoxins Trend labs  
  
CAD s/p CABG Resume low dose ASA- watch platelets No BB while on dobutamine Hold statin due to recent hepatic failure LHC performed 11/13 - low likelihood of benefit from revascularization  
  
Hx of VF arrest s/p BiV-ICD No recent shocks Keep K > 4 and Mg > 2  
   
PAF Currently in NSR, Paced Cont PO Amio 
  
Urinary retention, c/b serratia UTI and hematuria Appreciate Urology consult Cystoscopy performed 11/13 shows catheter induced cystitis  
  
Malnutrition Appreciate Nutritionist consult Prealbumin improved to 19 Cont Marinol   
  
COPD Appreciate Pulmonology assistance Continue nebs PRN   
PFTs complete 
  
Anemia Multifactorial, due to hemolysis + epistaxis + hematuria Intolerant of octreotide or doxycycline for AVM ppx due to prolonged QTc Transfuse for Hgb goal > 8.5 prior to OR Hgb stable - monitor   
  
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  
  
Hx of sternal wound infection, sternectomy Dr. Kenny Dailey has reviewed CTs Sternum closed post op- monitor  
  
Vocal cord paralysis Improved Speech therapy following May need MBS Debility Continue PT/OT  
  
Ppx Protonix PT/OT when able Post op antibiotics per routine Will resume Ancef for 2 weeks post op per Dr. Kenny Dailey for Impella prophylaxis Bowel regimen Cont Mechanical soft PICC team 
DC pa cath Dispo: 
Remain in [facility] for now, hopefully transfer tomorrow to [facility] Thank you for allowing us to participate in your patient's care. Mounika Solis MD, Bart Charles Chief of Cardiology, [facility] Medical Director Calos Rueda [address] [place] [address], [address] Office [phone] Fax [phone] History: 
Past Medical History:  
Diagnosis Date  Degenerative disc disease, lumbar  Heart failure ([facility] [address]  High cholesterol  Hypertension  Paroxysmal atrial fibrillation ([facility] [address] 4/2/2019  Spinal stenosis Past Surgical History:  
Procedure Laterality Date  COLONOSCOPY Left 11/11/2019 COLONOSCOPY at bedside performed by Rogerio Pavon MD at [address] [facility]  HX CORONARY ARTERY BYPASS GRAFT    
 triple  HX HERNIA REPAIR    
 HX IMPLANTABLE CARDIOVERTER DEFIBRILLATOR  [place] CARDIOVERSION ELECTIVE ARRHYTHMIA EXTERNAL N/A 6/10/2019 EP CARDIOVERSION [facility] [facility]  [place] CARDIOVERSION ELECTIVE ARRHYTHMIA EXTERNAL N/A 6/18/2019 EP CARDIOVERSION performed [facility] [address] [facility]  [place] INSJ/RPLCMT PERM DFB W/TRNSVNS LDS 1/DUAL CHMBR N/A 6/21/2019 INSERT ICD BIV MULTI performed [facility] [address] [facility]  [place] TCAT IMPL WRLS P-ART PRS SNR L-T HEMODYN MNTR N/A 9/18/2019 IMPLANT HEART FAILURE MONITORING DEVICE performed by Dina Arizmendi MD at [address]  CATH LAB Social History Socioeconomic History  Marital status:  Spouse name: Not on file  Number of children: Not on file  Years of education: Not on file  Highest education level: Not on file Occupational History  Not on file Social Needs  Financial resource strain: Not on file  Food insecurity:  
  Worry: Not on file Inability: Not on file  Transportation needs:  
  Medical: Not on file Non-medical: Not on file Tobacco Use  Smoking status: Former Smoker Last attempt to quit: 2010 Years since quittin.9  Smokeless tobacco: Never Used Substance and Sexual Activity  Alcohol use: Not Currently Comment: rarely  Drug use: Never  Sexual activity: Not on file Lifestyle  Physical activity:  
  Days per week: Not on file Minutes per session: Not on file  Stress: Not on file Relationships  Social connections:  
  Talks on phone: Not on file Gets together: Not on file Attends Jewish service: Not on file Active member of club or organization: Not on file Attends meetings of clubs or organizations: Not on file Relationship status: Not on file  Intimate partner violence:  
  Fear of current or ex partner: Not on file Emotionally abused: Not on file Physically abused: Not on file Forced sexual activity: Not on file Other Topics Concern [address] Service Not Asked  Blood Transfusions Not Asked  Caffeine Concern Not Asked  Occupational Exposure Not Asked Becky Hossein Hazards Not Asked  Sleep Concern Not Asked  Stress Concern Not Asked  Weight Concern Not Asked  Special Diet Not Asked  Back Care Not Asked  Exercise Not Asked  Bike Helmet Not Asked  Seat Belt Not Asked  Self-Exams Not Asked Social History Narrative  Not on file Family History Problem Relation Age of Onset  Lung Disease Mother  Hypertension Mother [facility] Arthritis-osteo Mother  Heart Disease Mother  Heart Disease Father  Diabetes Father Current Medications:  
Current Facility-Administered Medications Medication Dose Route Frequency Provider Last Rate Last Dose  potassium chloride 20 mEq in 50 ml IVPB  20 mEq IntraVENous Q1H Alexa Lopez MD 25 mL/hr at 11/23/19 0932 20 mEq at 11/23/19 0932  [START ON 11/24/2019] potassium chloride SR (KLOR-CON 10) tablet 40 mEq  40 mEq Oral DAILY Gian Ruff NP      
 tamsulosin [facility]) capsule 0.4 mg  0.4 mg Oral DAILY Logan Kincaid MD   0.4 mg at 11/23/19 9814  aspirin chewable tablet 81 mg  81 mg Oral DAILY Levi, Shana B, NP   81 mg at 11/23/19 6622  bumetanide (BUMEX) injection 2 mg  2 mg IntraVENous TID Tyson Bile B, NP   2 mg at 11/23/19 0500  sildenafil (antihypertensive) (REVATIO) tablet 20 mg  20 mg Oral TID Tyson Bile B, NP   20 mg at 11/23/19 0925  
 amiodarone (CORDARONE) tablet 400 mg  400 mg Oral DAILY Levi, Shana B, NP   400 mg at 11/23/19 9930  pantoprazole (PROTONIX) tablet 40 mg  40 mg Oral ACB Levi, Shana B, NP   40 mg at 11/23/19 0925  
 insulin lispro (HUMALOG) injection   SubCUTAneous TIDAC Levi, Shana B, NP   Stopped at 11/21/19 1630  
 insulin lispro (HUMALOG) injection   SubCUTAneous AC&HS Tyson Bile B, NP   Stopped at 11/22/19 2200  Warfarin MD/NP dosing   Other PRN Nichelle Altman MD      
 epoetin yvonne-epbx (RETACRIT) injection 20,000 Units  20,000 Units SubCUTAneous Q7D Logan Daly MD   20,000 Units at 11/19/19 0924  
 0.9% sodium chloride infusion 250 mL  250 mL IntraVENous PRN Alexa Lopez MD      
 ceFAZolin (ANCEF) 1 g in 0.9% sodium chloride (MBP/ADV) 50 mL  1 g IntraVENous Q8H Levi Shana B,  mL/hr at 11/23/19 0818 1 g at 11/23/19 0818  dronabinol (MARINOL) capsule 2.5 mg  2.5 mg Oral ACB&D Camilla Aguilera B, NP   2.5 mg at 11/23/19 2569  bivalirudin (ANGIOMAX) 250 mg in 0.9% sodium chloride (MBP/ADV) 50 mL  0.02-2.5 mg/kg/hr IntraVENous TITRATE Camilla Aguilera B, NP 4 mL/hr at 11/23/19 0803 0.2198 mg/kg/hr at 11/23/19 7028  lidocaine (LIDODERM) 5 % patch 1 Patch  1 Patch TransDERmal Q24H Ivy Sims B, NP   1 Patch at 11/22/19 2009  
 0.9% sodium chloride infusion  9 mL/hr IntraVENous CONTINUOUS Polliard, Marliss Siemens, NP   Stopped at 11/22/19 1400  
 0.45% sodium chloride infusion  10 mL/hr IntraVENous CONTINUOUS Polliard, Marliss Siemens, NP   Stopped at 11/22/19 1736  sodium chloride (NS) flush 5-40 mL  5-40 mL IntraVENous Q8H Polliard, Marliss Siemens, NP   10 mL at 11/23/19 4479  sodium chloride (NS) flush 5-40 mL  5-40 mL IntraVENous PRN Polliard, Marliss Siemens, NP      
 acetaminophen (TYLENOL) tablet 650 mg  650 mg Oral Q6H PRN Polliard, Marliss Siemens, NP   650 mg at 11/21/19 1317  
 oxyCODONE IR (ROXICODONE) tablet 5 mg  5 mg Oral Q4H PRN Polliard, Marliss Siemens, NP   5 mg at 11/19/19 2207  oxyCODONE IR (ROXICODONE) tablet 10 mg  10 mg Oral Q4H PRN Polliard, Marliss Siemens, NP   10 mg at 11/22/19 2137  
 naloxone (NARCAN) injection 0.4 mg  0.4 mg IntraVENous PRN Polliard, Marliss Siemens, NP      
 ondansetron [facility]) injection 4 mg  4 mg IntraVENous Q4H PRN Polliard, Marliss Siemens, NP   4 mg at 11/20/19 2000  
 albuterol-ipratropium (DUO-NEB) 2.5 MG-0.5 MG/3 ML  3 mL Nebulization Q6H PRN Katelynnd, Marliss Siemens, NP      
 senna-docusate (PERICOLACE) 8.6-50 mg per tablet 1 Tab  1 Tab Oral DAILY Polliard, Marliss Siemens, NP   1 Tab at 11/23/19 7378  polyethylene glycol (MIRALAX) packet 17 g  17 g Oral DAILY Polliard, Marliss Siemens, NP   17 g at 11/23/19 6919  bisacodyl (DULCOLAX) tablet 5 mg  5 mg Oral DAILY PRN Polliard, Marliss Siemens, NP      
 sucralfate (CARAFATE) tablet 1 g  1 g Oral AC&HS Aracelis Howell, NP   1 g at 11/22/19 5657  0.9% sodium chloride infusion 250 mL  250 mL IntraVENous PRN Willem Da Silva MD      
 influenza vaccine 2019-20 (6 mos+)(PF) (FLUARIX/FLULAVAL/FLUZONE QUAD) injection 0.5 mL  0.5 mL IntraMUSCular PRIOR TO DISCHARGE Emilee Altman MD      
 hydrALAZINE (APRESOLINE) 20 mg/mL injection 20 mg  20 mg IntraVENous Q6H PRN Levi, Shana B, NP   20 mg at 11/19/19 0547  morphine injection 4 mg  4 mg IntraVENous Q2H PRN Levi, Shana B, NP   4 mg at 11/22/19 0007  dextrose 10 % infusion 125-250 mL  125-250 mL IntraVENous PRN Mounika Altman MD   Stopped at 11/18/19 0700  
 milrinone (PRIMACOR) 20 MG/100 ML D5W infusion  0-0.75 mcg/kg/min IntraVENous TITRATE Shana Sims NP 10 mL/hr at 11/23/19 0820 0.375 mcg/kg/min at 11/23/19 0820  
 DOBUTamine (DOBUTREX) 1,000 mg/250 mL (4,000 mcg/mL) infusion  0-10 mcg/kg/min IntraVENous TITRATE Bina Herrera NP   Stopped at 11/21/19 1255  insulin regular (NOVOLIN R, HUMULIN R) 100 Units in 0.9% sodium chloride 100 mL infusion  1-50 Units/hr IntraVENous TITRATE Bina Herrera NP   Stopped at 11/21/19 1279  ELECTROLYTE REPLACEMENT NOTE: Nurse to review Serum Potassium and Magnesuim levels and Initiate Electrolyte Replacement Protocol as needed  1 Each Other PRN Mercedez Lincoln NP Allergies: No Known Allergies ROS:   
Review of Systems Constitutional: Negative for chills, fever and malaise/fatigue. HENT: Positive for hearing loss. Respiratory: Negative for cough, sputum production and shortness of breath. Cardiovascular: Positive for leg swelling. Negative for chest pain. Gastrointestinal: Negative for heartburn, nausea and vomiting. Musculoskeletal: Negative for myalgias. Skin: Negative. Neurological: Positive for weakness. Negative for dizziness and headaches. Endo/Heme/Allergies: Negative. Psychiatric/Behavioral: Negative. Physical Exam:  
Physical Exam 
Vitals signs and nursing note reviewed. Constitutional:   
   Appearance: He is well-developed and well-nourished. HENT:  
   Head: Normocephalic. Eyes:  
   Pupils: Pupils are equal, round, and reactive to light. Neck: Musculoskeletal: Normal range of motion and neck supple. Vascular: No JVD. Cardiovascular:  
   Rate and Rhythm: Normal rate and regular rhythm. Heart sounds: Normal heart sounds. Comments: + VAD hum Pulmonary:  
   Effort: Pulmonary effort is normal.  
   Breath sounds: Normal breath sounds. Abdominal:  
   General: Bowel sounds are normal. There is no distension. Palpations: Abdomen is soft. Musculoskeletal: Normal range of motion. General: Edema present. Comments: R > L Skin: 
   General: Skin is warm and dry. Neurological:  
   Mental Status: He is alert and oriented to person, place, and time. Vitals:  
Visit Vitals BP 93/71 (BP 1 Location: Right arm, BP Patient Position: At rest) Pulse 98 Temp 97.1 °F (36.2 °C) Resp 16 Ht 6' 2\" (1.88 m) Wt 206 lb (93.4 kg) SpO2 95% BMI 26.45 kg/m² Temp (24hrs), Av.2 °F (36.8 °C), Min:97.1 °F (36.2 °C), Max:99.2 °F (37.3 °C) Hemodynamics: 
 CO: CO (l/min): 5.4 l/min CI: CI (l/min/m2): 2.5 l/min/m2 CVP: CVP (mmHg): 15 mmHg (19 1400) SVR: SVR (dyne*sec)/cm5: 1067 (dyne*sec)/cm5 (19 1200) PAP Systolic: PAP Systolic: 50 (36/69/05 4480) PAP Diastolic: PAP Diastolic: 30 (16/75/20 0609) PVR:   
 SV02: SVO2 (%): 45 % (19 1200) SCV02: SCVO2 (%): 75 % (10/29/19 1900) Admission Weight: Last Weight Weight: 192 lb 10.9 oz (87.4 kg) Weight: 206 lb (93.4 kg) Intake / Output / Drain: 
Last 24 hrs.:  
 
Intake/Output Summary (Last 24 hours) at 2019 1002 Last data filed at 2019 9263 Gross per 24 hour Intake 1201 ml Output 2590 ml Net -1389 ml  
 
 
Drains:  
24h: 330 8h: 175 Extubation Date / Time:  19 at 1030am 
 
Oxygen Therapy: 
Oxygen Therapy O2 Sat (%): 95 % (11/23/19 0700) Pulse via Oximetry: 104 beats per minute (11/22/19 1400) O2 Device: Nasal cannula (11/23/19 0400) O2 Flow Rate (L/min): 6 l/min (11/23/19 0400) FIO2 (%): 50 % (11/19/19 1035) VAD Data: LVAD (Heartmate) Pump Speed (RPM): 5200 Pump Flow (LPM): 4.3 PI (Pulsitility Index): 3.3 Power: 3.6 MAP: 72  Test: Yes 
Back Up  at Bedside & Labeled: Yes Power Module Test: Yes Driveline Site Care: No 
Driveline Dressing: Clean, Dry, and Intact Drive Line Exam: 
Stabilization device intact: yes Line inspected for damage: yes Appearance: no edema, erythema, drainage Stabilization Method: anchor to abd Recent Labs:  
Labs Latest Ref Rng & Units 11/23/2019 11/22/2019 11/21/2019 11/20/2019 11/19/2019 11/19/2019 11/18/2019 WBC 4.1 - 11.1 K/uL 6.9 6.5 7.0 6.6 - 3. 9(L) -  
RBC 4.10 - 5.70 M/uL 3.03(L) 2.84(L) 2.80(L) 2.62(L) - 2.43(L) - Hemoglobin 12.1 - 17.0 g/dL 9.3(L) 8.7(L) 8.6(L) 8.0(L) 7. 9(L) 7. 3(L) - Hematocrit 36.6 - 50.3 % 29. 5(L) 28. 0(L) 27. 9(L) 25. 9(L) 25. 3(L) 23. 1(L) -  
MCV 80.0 - 99.0 FL 97.4 98.6 99. 6(H) 98.9 - 95.1 - Platelets 525 - 877 K/uL 130(L) 105(L) 105(L) 92(L) - 74(L) - Lymphocytes 12 - 49 % - - - - - 18 - Monocytes 5 - 13 % - - - - - 9 - Eosinophils 0 - 7 % - - - - - 0 - Basophils 0 - 1 % - - - - - 1 - Albumin 3.5 - 5.0 g/dL 2. 4(L) 2. 5(L) 2. 6(L) 2. 7(L) - 2. 7(L) 2. 6(L) Calcium 8.5 - 10.1 MG/DL 8.4(L) 8.4(L) 8.6 8.6 - 8.4(L) 8. 3(L) SGOT 15 - 37 U/L 18 23 40(H) 60(H) - 52(H) 50(H) Glucose 65 - 100 mg/dL 97 95 95 110(H) - 102(H) 106(H) BUN 6 - 20 MG/DL 22(H) 24(H) 23(H) 22(H) - 19 18 Creatinine 0.70 - 1.30 MG/DL 1.05 1.20 1.36(H) 1.43(H) - 1.30 1.26 Sodium 136 - 145 mmol/L 132(L) 132(L) 133(L) 138 - 141 141 Potassium 3.5 - 5.1 mmol/L 3.2(L) 3.5 4.0 3.9 3.8 3.9 4.3 TSH 0.36 - 3.74 uIU/mL - - - - - - -  
PSA 0.01 - 4.0 ng/mL - - - - - - -  
 LDH 85 - 241 U/L 341(H) 389(H) 460(H) 496(H) - 491(H) -  
CEA ng/mL - - - - - - - Some recent data might be hidden EKG:  
EKG Results Procedure 720 Value Units Date/Time EKG, 12 LEAD, INITIAL [[id number]] Collected:  11/19/19 0417 Order Status:  Completed Updated:  11/19/19 1224 Ventricular Rate 76 BPM   
  Atrial Rate 0 BPM   
  QRS Duration 156 ms   
  Q-T Interval 564 ms QTC Calculation (Bezet) 634 ms Calculated R Axis 81 degrees Calculated T Axis 120 degrees Diagnosis -- Electronic ventricular pacemaker Baseline artifact When compared with ECG of 13-NOV-2019 04:40, 
Vent. rate has increased BY   2 BPM 
Confirmed by Alfonzo Alarcon M.D., Chris Stallworth ([id number]) on 11/19/2019 12:23:52 PM 
  
 EKG, 12 LEAD, INITIAL [[id number]] Order Status:  Canceled EKG, 12 LEAD, INITIAL [[id number]] Order Status:  Canceled EKG, 12 LEAD, INITIAL [[id number]] Order Status:  Canceled EKG, 12 LEAD, INITIAL [[id number]] Order Status:  Canceled EKG, 12 LEAD, INITIAL [[id number]] Order Status:  Canceled EKG, 12 LEAD, INITIAL [[id number]] Collected:  11/13/19 0440 Order Status:  Completed Updated:  11/13/19 2200 Ventricular Rate 74 BPM   
  Atrial Rate 66 BPM   
  QRS Duration 166 ms   
  Q-T Interval 488 ms QTC Calculation (Bezet) 541 ms Calculated R Axis -15 degrees Calculated T Axis 157 degrees Diagnosis -- Electronic ventricular pacemaker When compared with ECG of 11-NOV-2019 04:49, No significant change was found Confirmed by Silas Otoole M.D., Lu Taylor ([id number]) on 11/13/2019 10:00:38 PM 
  
 EKG, 12 LEAD, INITIAL [[id number]] Order Status:  Canceled EKG, 12 LEAD, INITIAL [[id number]] Collected:  11/11/19 0449 Order Status:  Completed Updated:  11/11/19 5039 Ventricular Rate 74 BPM   
  Atrial Rate 39 BPM   
  QRS Duration 158 ms Q-T Interval 504 ms QTC Calculation (Bezet) 559 ms Calculated R Axis 13 degrees Calculated T Axis 175 degrees Diagnosis -- Electronic ventricular pacemaker When compared with ECG of 05-NOV-2019 10:44, 
Vent. rate has decreased BY   9 BPM 
Confirmed by Adan Velasquez M.D., Abe Plata ([id number]) on 11/11/2019 8:54:27 AM 
  
 EKG, 12 LEAD, INITIAL [[id number]] Order Status:  Canceled EKG, 12 LEAD, INITIAL [[id number]] Order Status:  Canceled EKG, 12 LEAD, INITIAL [[id number]] Order Status:  Canceled EKG, 12 LEAD, INITIAL [[id number]] Collected:  11/05/19 1044 Order Status:  Completed Updated:  11/05/19 1226 Ventricular Rate 83 BPM   
  Atrial Rate 83 BPM   
  P-R Interval 80 ms QRS Duration 162 ms Q-T Interval 502 ms QTC Calculation (Bezet) 589 ms Calculated R Axis 2 degrees Calculated T Axis -156 degrees Diagnosis -- Electronic ventricular pacemaker Marked T wave abnormality, consider  
anterolateral ischemia When compared with ECG of 02-NOV-2019 10:42, No significant change was found Confirmed by Sabiha Brandt M.D., Lei Henry ([id number]) on 11/5/2019 12:25:52 PM 
  
 EKG, 12 LEAD, INITIAL [[id number]] Collected:  11/02/19 1042 Order Status:  Completed Updated:  11/03/19 5592 Ventricular Rate 91 BPM   
  Atrial Rate 91 BPM   
  P-R Interval 108 ms QRS Duration 148 ms Q-T Interval 524 ms QTC Calculation (Bezet) 644 ms Calculated R Axis 5 degrees Calculated T Axis -152 degrees Diagnosis --  
  Sinus rhythm with short RI with occasional premature ventricular complexes  
and fusion complexes Nonspecific intraventricular block Marked T wave abnormality, consider anterolateral ischemia When compared with ECG of 26-OCT-2019 06:55, Sinus rhythm has replaced Electronic ventricular pacemaker Prolonged QT Confirmed by Miles Garza ([id number]) on 11/3/2019 6:35:23 AM 
  
 EKG, 12 LEAD, INITIAL [[id number]] Order Status:  Canceled EKG, 12 LEAD, INITIAL [[id number]] Collected:  10/26/19 3954 Order Status:  Completed Updated:  10/26/19 1807 Ventricular Rate 80 BPM   
  Atrial Rate 76 BPM   
  QRS Duration 174 ms Q-T Interval 462 ms QTC Calculation (Bezet) 532 ms Calculated R Axis 28 degrees Calculated T Axis 150 degrees Diagnosis -- Atrial flutter Left bundle branch block When compared with ECG of 25-OCT-2019 18:20, 
Electronic demand pacing is not noted Confirmed by Nathaly Mg ([id number]) on 10/26/2019 6:07:36 PM 
  
 EKG, 12 LEAD, INITIAL [[id number]] Collected:  10/25/19 1820 Order Status:  Completed Updated:  10/26/19 1749 Ventricular Rate 72 BPM   
  Atrial Rate 72 BPM   
  P-R Interval 86 ms QRS Duration 116 ms   
  Q-T Interval 506 ms QTC Calculation (Bezet) 554 ms Calculated P Axis 9 degrees Calculated R Axis -68 degrees Calculated T Axis 10 degrees Diagnosis --  
  undetermied rhythm possible atrial flutter Nonspecific intraventricular conduction delay Ventricular paced rhythm When compared with ECG of 26-JUN-2019 11:39, 
Significant changes have occurred Confirmed by Nathaly Mg ([id number]) on 10/26/2019 5:49:02 PM 
  
  
No specialty comments available. 10/25/19 OR ECHO JACQUE INTRAOP  11/18/2019 Narrative See Anesthesia Procedure note for procedure details. Signed by:   
  
Echo Results  (Last 48 hours) None CXR Results  (Last 48 hours)  
          
 11/23/19 0431  XR CHEST PORT Final result Impression:  IMPRESSION: No significant change following removal of right jugular catheters. Narrative:  EXAM:  XR CHEST PORT. INDICATION: Postop LVAD implant. COMPARISON: 11/22/2019. FINDINGS:   
A portable AP radiograph of the chest was obtained at 0405 hours. There are  
sternal sutures and mediastinal clips and a pacemaker in the left chest. There  
is a left ventricular assist device unchanged in position. Lines and tubes: The patient is on a cardiac monitor and nasal oxygen.   The  
 right arm PICC line is unchanged in position. The chest tubes and mediastinal  
drain are unchanged in position. The right jugular triple-lumen and [place]  
catheters have been removed. Lungs: There is interstitial edema throughout the lungs with atelectasis at the  
lung bases, left greater than right. Pleura: There is no pneumothorax or pleural effusion. Mediastinum: The cardiac silhouette is enlarged. Bones and soft tissues: The bones and soft tissues are grossly within normal  
limits. 11/22/19 0510  XR CHEST PORT Final result Impression:  IMPRESSION:  
1. Decrease in pulmonary edema 2. No change left lower lobe atelectasis. The aeration has improved in the right  
lower lobe. Narrative:  EXAM: XR CHEST PORT INDICATION: post LVAD implant COMPARISON: 11/21/2019 FINDINGS: A portable AP radiograph of the chest was obtained at 0339 hours. The  
patient is on a cardiac monitor. The right IJ catheter, [place] catheter, ICD  
and LVAD remain in place. Mediastinal drains and pleural drains are noted. Small pleural effusions are present. There is bibasilar atelectasis left greater  
than right which has improved at the right lung base. Mild pulmonary edema has decreased. MD Calos Varma [address], [address] Office [phone] Fax [phone] 
24 hour VAD/HF Pager: [phone]

## 2019-11-23 NOTE — PROGRESS NOTES
[facility] 
 [address] [facility], [facility] [address] [facility] Phone: [phone]   [phone]   
  
Nephrology Progress Note Sona Kiser     1950     [id number] Date of Admission : 10/25/2019 
11/23/19 CC:  Follow up for JUAN J, CKD, Hyponatremia Assessment and Plan JUAN J on CKD 
- 2/2 CRS and urinary retention 
- volume status appears to be stable for the moment, continue present Bumex 
- PRN albumin for Hypotension 
  
Gross hematuria, BPH w/ retention: 
- off CBI pre VAD. cysto pre op showed catheter related Cystitis @ postr wall  
- incomplete voiding since neal removal ==> started Flomax. Avoid re-inserting neal Acute on Chronic HFrEF  
- EF 16-20%, NYHA Class IV , hx of VF arrest - s/p AICD 
- Impella insertion 10/29- removed 11/18  
- s/p LVAD placement on 11/18 
- Inotropes per per promary team 
 
CKD Stage III  
- Mild Left renal atrophy at baseline Hoarseness, vocal cord paralysis, mediastinal adenopathy: 
- will need eventual PET/CT 
  
Acute Resp failure JOSE on CPAP  
- Post op resp failure : On Vent  
  
PAF s/p DCCV 6/19 
  
Anemia: 
- from blood loss s/p multiple blood products - s/p IV iron,  Repeat iron studies ok 
- on PAVEL q7 d Serratia and Enteroccocus UTI: 
- completed abx Interval History:   
Seen and examined. Stable resp status presently; good U/O; I<O - continue Review of Systems: Pertinent items are noted in HPI. Current Medications:  
Current Facility-Administered Medications Medication Dose Route Frequency  [START ON 11/24/2019] potassium chloride SR (KLOR-CON 10) tablet 40 mEq  40 mEq Oral DAILY  bumetanide (BUMEX) injection 3 mg  3 mg IntraVENous TID  [START ON 11/24/2019] amiodarone (CORDARONE) tablet 200 mg  200 mg Oral DAILY  mirtazapine (REMERON) tablet 15 mg  15 mg Oral QHS  balsam peru-castor oil (VENELEX) ointment   Topical TID  tamsulosin (FLOMAX) capsule 0.4 mg  0.4 mg Oral DAILY  aspirin chewable tablet 81 mg  81 mg Oral DAILY  sildenafil (antihypertensive) (REVATIO) tablet 20 mg  20 mg Oral TID  pantoprazole (PROTONIX) tablet 40 mg  40 mg Oral ACB  insulin lispro (HUMALOG) injection   SubCUTAneous TIDAC  insulin lispro (HUMALOG) injection   SubCUTAneous AC&HS  Warfarin MD/NP dosing   Other PRN  
 epoetin yvonne-epbx (RETACRIT) injection 20,000 Units  20,000 Units SubCUTAneous Q7D  
 0.9% sodium chloride infusion 250 mL  250 mL IntraVENous PRN  
 ceFAZolin (ANCEF) 1 g in 0.9% sodium chloride (MBP/ADV) 50 mL  1 g IntraVENous Q8H  
 dronabinol (MARINOL) capsule 2.5 mg  2.5 mg Oral ACB&D  
 bivalirudin (ANGIOMAX) 250 mg in 0.9% sodium chloride (MBP/ADV) 50 mL  0.02-2.5 mg/kg/hr IntraVENous TITRATE  lidocaine (LIDODERM) 5 % patch 1 Patch  1 Patch TransDERmal Q24H  
 0.9% sodium chloride infusion  9 mL/hr IntraVENous CONTINUOUS  
 0.45% sodium chloride infusion  10 mL/hr IntraVENous CONTINUOUS  
 sodium chloride (NS) flush 5-40 mL  5-40 mL IntraVENous Q8H  
 sodium chloride (NS) flush 5-40 mL  5-40 mL IntraVENous PRN  
 acetaminophen (TYLENOL) tablet 650 mg  650 mg Oral Q6H PRN  
 oxyCODONE IR (ROXICODONE) tablet 5 mg  5 mg Oral Q4H PRN  
 oxyCODONE IR (ROXICODONE) tablet 10 mg  10 mg Oral Q4H PRN  
 naloxone (NARCAN) injection 0.4 mg  0.4 mg IntraVENous PRN  
 ondansetron (ZOFRAN) injection 4 mg  4 mg IntraVENous Q4H PRN  
 albuterol-ipratropium (DUO-NEB) 2.5 MG-0.5 MG/3 ML  3 mL Nebulization Q6H PRN  
 senna-docusate (PERICOLACE) 8.6-50 mg per tablet 1 Tab  1 Tab Oral DAILY  polyethylene glycol (MIRALAX) packet 17 g  17 g Oral DAILY  bisacodyl (DULCOLAX) tablet 5 mg  5 mg Oral DAILY PRN  
 sucralfate (CARAFATE) tablet 1 g  1 g Oral AC&HS  
 0.9% sodium chloride infusion 250 mL  250 mL IntraVENous PRN  
 influenza vaccine 2019-20 (6 mos+)(PF) (FLUARIX/FLULAVAL/FLUZONE QUAD) injection 0.5 mL  0.5 mL IntraMUSCular PRIOR TO DISCHARGE  
  hydrALAZINE (APRESOLINE) 20 mg/mL injection 20 mg  20 mg IntraVENous Q6H PRN  
 morphine injection 4 mg  4 mg IntraVENous Q2H PRN  
 dextrose 10 % infusion 125-250 mL  125-250 mL IntraVENous PRN  
 milrinone (PRIMACOR) 20 MG/100 ML D5W infusion  0.3 mcg/kg/min IntraVENous TITRATE  insulin regular (NOVOLIN R, HUMULIN R) 100 Units in 0.9% sodium chloride 100 mL infusion  1-50 Units/hr IntraVENous TITRATE  ELECTROLYTE REPLACEMENT NOTE: Nurse to review Serum Potassium and Magnesuim levels and Initiate Electrolyte Replacement Protocol as needed  1 Each Other PRN No Known Allergies Objective: 
Vitals:   
Vitals:  
 11/23/19 1200 11/23/19 1300 11/23/19 1400 11/23/19 1600 BP:      
Pulse:   100 (!) 102 Resp:   24 29 Temp: 98.5 °F (36.9 °C)   97.7 °F (36.5 °C) SpO2:  98% Weight:      
Height:      
 
Intake and Output: 
11/23 0701 - 11/23 1900 In: 878.9 [P.O.:600; I.V.:278.9] Out: 555 [Urine:380; Drains:175] 11/21 1901 - 11/23 0700 In: 1987 [P.O.:600; I.V.:1387] Out: 7505 [Urine:2900; Drains:540] Physical Examination: 
 
General: On vent Neck:  Lines + Resp:  Rales B/L  
CV:  VADsounds  2+ b/l LE edema GI:  Soft, NT, + Bowel sounds Neurologic:  AAO X3  
:  No neal [x]    High complexity decision making was performed 
[]    Patient is at high-risk of decompensation with multiple organ involvement Lab Data Personally Reviewed: I have reviewed all the pertinent labs, microbiology data and radiology studies during assessment. Recent Labs  
  11/23/19 
0409 11/22/19 
0403 11/21/19 
0413 * 132* 133* K 3.2* 3.5 4.0  
CL 96* 97 98 CO2 30 27 28 GLU 97 95 95 BUN 22* 24* 23* CREA 1.05 1.20 1.36* CA 8.4* 8.4* 8.6 MG 1.9 1.9 1.9 ALB 2.4* 2.5* 2.6* SGOT 18 23 40* ALT 7* 8* 9* INR 1.8* 1.7* 1.7* Recent Labs  
  11/23/19 
0409 11/22/19 
0403 11/21/19 
0413 WBC 6.9 6.5 7.0 HGB 9.3* 8.7* 8.6* HCT 29.5* 28.0* 27.9*  
* 105* 105* No results found for: SDES Lab Results Component Value Date/Time Culture result: NO GROWTH 5 DAYS 11/12/2019 11:18 AM  
 Culture result: SERRATIA MARCESCENS (A) 10/31/2019 10:05 AM  
 Culture result: SERRATIA MARCESCENS (2ND COLONY TYPE/STRAIN) (A) 10/31/2019 10:05 AM  
 Culture result: ENTEROCOCCUS FAECALIS GROUP D (A) 10/31/2019 10:05 AM  
 Culture result: NO GROWTH 5 DAYS 10/25/2019 07:14 PM  
 
Recent Results (from the past 24 hour(s)) GLUCOSE, POC Collection Time: 11/23/19 12:33 AM  
Result Value Ref Range Glucose (POC) 112 (H) 65 - 100 mg/dL Performed by Nati Murillo METABOLIC PANEL, COMPREHENSIVE Collection Time: 11/23/19  4:09 AM  
Result Value Ref Range Sodium 132 (L) 136 - 145 mmol/L Potassium 3.2 (L) 3.5 - 5.1 mmol/L Chloride 96 (L) 97 - 108 mmol/L  
 CO2 30 21 - 32 mmol/L Anion gap 6 5 - 15 mmol/L Glucose 97 65 - 100 mg/dL BUN 22 (H) 6 - 20 MG/DL Creatinine 1.05 0.70 - 1.30 MG/DL  
 BUN/Creatinine ratio 21 (H) 12 - 20 GFR est AA >60 >60 ml/min/1.73m2 GFR est non-AA >60 >60 ml/min/1.73m2 Calcium 8.4 (L) 8.5 - 10.1 MG/DL Bilirubin, total 2.2 (H) 0.2 - 1.0 MG/DL  
 ALT (SGPT) 7 (L) 12 - 78 U/L  
 AST (SGOT) 18 15 - 37 U/L Alk. phosphatase 93 45 - 117 U/L Protein, total 5.5 (L) 6.4 - 8.2 g/dL Albumin 2.4 (L) 3.5 - 5.0 g/dL Globulin 3.1 2.0 - 4.0 g/dL A-G Ratio 0.8 (L) 1.1 - 2.2 MAGNESIUM Collection Time: 11/23/19  4:09 AM  
Result Value Ref Range Magnesium 1.9 1.6 - 2.4 mg/dL LD Collection Time: 11/23/19  4:09 AM  
Result Value Ref Range  (H) 85 - 241 U/L  
CBC W/O DIFF Collection Time: 11/23/19  4:09 AM  
Result Value Ref Range WBC 6.9 4.1 - 11.1 K/uL  
 RBC 3.03 (L) 4.10 - 5.70 M/uL HGB 9.3 (L) 12.1 - 17.0 g/dL HCT 29.5 (L) 36.6 - 50.3 % MCV 97.4 80.0 - 99.0 FL  
 MCH 30.7 26.0 - 34.0 PG  
 MCHC 31.5 30.0 - 36.5 g/dL  
 RDW 19.7 (H) 11.5 - 14.5 % PLATELET 120 (L) 838 - 400 K/uL MPV 10.2 8.9 - 12.9 FL  
 NRBC 0.0 0  WBC ABSOLUTE NRBC 0.00 0.00 - 0.01 K/uL PROTHROMBIN TIME + INR Collection Time: 11/23/19  4:09 AM  
Result Value Ref Range INR 1.8 (H) 0.9 - 1.1 Prothrombin time 17.5 (H) 9.0 - 11.1 sec PTT Collection Time: 11/23/19  4:09 AM  
Result Value Ref Range aPTT 65.6 (H) 22.1 - 32.0 sec  
 aPTT, therapeutic range     58.0 - 77.0 SECS  
NT-PRO BNP Collection Time: 11/23/19  4:09 AM  
Result Value Ref Range NT pro-BNP 9,154 (H) <125 PG/ML  
LACTIC ACID Collection Time: 11/23/19  4:11 AM  
Result Value Ref Range Lactic acid 1.5 0.4 - 2.0 MMOL/L  
GLUCOSE, POC Collection Time: 11/23/19  8:53 AM  
Result Value Ref Range Glucose (POC) 99 65 - 100 mg/dL Performed by Kari Garcia GLUCOSE, POC Collection Time: 11/23/19 12:13 PM  
Result Value Ref Range Glucose (POC) 169 (H) 65 - 100 mg/dL Performed by Kari Garcia   
PTT Collection Time: 11/23/19  4:11 PM  
Result Value Ref Range aPTT 69.9 (H) 22.1 - 32.0 sec  
 aPTT, therapeutic range     58.0 - 77.0 SECS  
GLUCOSE, POC Collection Time: 11/23/19  4:29 PM  
Result Value Ref Range Glucose (POC) 143 (H) 65 - 100 mg/dL Performed by [employer] Jose Total time spent with patient:  xxx   min. Care Plan discussed with: 
Patient Family RN Consulting Physician [facility],      
 
I have reviewed the flowsheets. Chart and Pertinent Notes have been reviewed. No change in PMH ,family and social history from Consult note.  
 
 
Melanie Akers MD

## 2019-11-24 NOTE — PROGRESS NOTES
Problem: Falls - Risk of 
Goal: *Absence of Falls Description Document Jessica Condon Fall Risk and appropriate interventions in the flowsheet. Outcome: Progressing Towards Goal 
Note: Fall Risk Interventions: 
Mobility Interventions: Communicate number of staff needed for ambulation/transfer, PT Consult for mobility concerns Mentation Interventions: Adequate sleep, hydration, pain control Medication Interventions: Evaluate medications/consider consulting pharmacy Elimination Interventions: Call light in reach History of Falls Interventions: Consult care management for discharge planning Problem: Pain Goal: *Control of Pain Outcome: Progressing Towards Goal 
  
Problem: Pressure Injury - Risk of 
Goal: *Prevention of pressure injury Description Document Mich Scale and appropriate interventions in the flowsheet. Outcome: Progressing Towards Goal 
Note: Pressure Injury Interventions: 
Sensory Interventions: Assess need for specialty bed, Discuss PT/OT consult with provider Moisture Interventions: Internal/External urinary devices Activity Interventions: Pressure redistribution bed/mattress(bed type), PT/OT evaluation Mobility Interventions: Pressure redistribution bed/mattress (bed type), PT/OT evaluation Nutrition Interventions: Document food/fluid/supplement intake Friction and Shear Interventions: Lift sheet, Minimize layers Problem: Infection - Risk of, Multi-drug Resistant Organism Colonization (MDRO) Goal: *Absence of MDRO colonization Outcome: Progressing Towards Goal 
  
Problem: Heart Failure: Discharge Outcomes Goal: *Demonstrates ability to perform prescribed activity without shortness of breath or discomfort Outcome: Progressing Towards Goal 
Goal: *Verbalizes understanding and describes prescribed diet Outcome: Progressing Towards Goal 
Goal: *Verbalizes understanding/describes prescribed medications Outcome: Progressing Towards Goal 
  
 Problem: Diabetes Self-Management Goal: *Disease process and treatment process Description Define diabetes and identify own type of diabetes; list 3 options for treating diabetes. Outcome: Progressing Towards Goal 
Goal: *Incorporating nutritional management into lifestyle Description Describe effect of type, amount and timing of food on blood glucose; list 3 methods for planning meals. Outcome: Progressing Towards Goal 
Goal: *Incorporating physical activity into lifestyle Description State effect of exercise on blood glucose levels. Outcome: Progressing Towards Goal 
Goal: *Developing strategies to promote health/change behavior Description Define the ABC's of diabetes; identify appropriate screenings, schedule and personal plan for screenings. Outcome: Progressing Towards Goal 
Goal: *Using medications safely Description State effect of diabetes medications on diabetes; name diabetes medication taking, action and side effects. Outcome: Progressing Towards Goal 
Goal: *Monitoring blood glucose, interpreting and using results Description Identify recommended blood glucose targets  and personal targets. Outcome: Progressing Towards Goal 
  
Problem: Discharge Planning Goal: *Discharge to safe environment Outcome: Progressing Towards Goal 
  
Problem: Nutrition Deficit Goal: *Optimize nutritional status Outcome: Progressing Towards Goal 
  
Problem: Nutrition Deficit Goal: *Optimize nutritional status Outcome: Progressing Towards Goal 
  
Problem: Infection - Risk of, Urinary Catheter-Associated Urinary Tract Infection Goal: *Absence of infection signs and symptoms Outcome: Progressing Towards Goal 
  
Problem: LVAD Post-op Day 4/Transfer Day Goal: *Hemodynamically stable Outcome: Progressing Towards Goal 
Goal: *Lungs clear or at baseline Outcome: Progressing Towards Goal 
Goal: *Adequate oxygenation Outcome: Progressing Towards Goal 
 Goal: *Optimal pain control at patient's stated goal 
Outcome: Progressing Towards Goal 
Goal: *Incisions without signs and symptoms of wound complication Outcome: Progressing Towards Goal 
Goal: *Tolerating diet Outcome: Progressing Towards Goal 
Goal: *Demonstrates progressive activity Outcome: Progressing Towards Goal

## 2019-11-24 NOTE — PROGRESS NOTES
[facility] 
 [address] [facility], [address] [address] [facility] Phone: [phone]   [phone]   
  
Nephrology Progress Note Sona Kiser     1950     [id number] Date of Admission : 10/25/2019 
11/24/19 CC:  Follow up for JUAN J, CKD, Hyponatremia Assessment and Plan JUAN J on CKD 
- 2/2 CRS and urinary retention 
- volume status looks to be worsening 
-agree with Bumex drip - increase rate to 1 mg/hour 
  
Gross hematuria, BPH w/ retention: 
- off CBI pre VAD. cysto pre op showed catheter related Cystitis @ postr wall  
- incomplete voiding since neal removal ==> started Flomax. Avoid re-inserting neal Acute on Chronic HFrEF  
- EF 16-20%, NYHA Class IV , hx of VF arrest - s/p AICD 
- Impella insertion 10/29- removed 11/18  
- s/p LVAD placement on 11/18 
- Inotropes per per promary team 
 
CKD Stage III  
- Mild Left renal atrophy at baseline Hoarseness, vocal cord paralysis, mediastinal adenopathy: 
- will need eventual PET/CT 
  
Acute Resp failure JOSE on CPAP  
- Post op resp failure : On Vent  
  
PAF s/p DCCV 6/19 
  
Anemia: 
- from blood loss s/p multiple blood products - s/p IV iron,  Repeat iron studies ok 
- on PAVEL q7 d Serratia and Enteroccocus UTI: 
- completed abx Interval History:   
Seen and examined. Increasing edema, now with increased rales Review of Systems: Pertinent items are noted in HPI. Current Medications:  
Current Facility-Administered Medications Medication Dose Route Frequency  potassium chloride 20 mEq in 50 ml IVPB  20 mEq IntraVENous Q1H  
 magnesium oxide (MAG-OX) tablet 400 mg  400 mg Oral BID  potassium chloride SR (KLOR-CON 10) tablet 40 mEq  40 mEq Oral BID  
 oxyCODONE IR (ROXICODONE) tablet 5 mg  5 mg Oral Q6H PRN  
 bumetanide (BUMEX) 0.25 mg/mL infusion  1 mg/hr IntraVENous CONTINUOUS  
 mirtazapine (REMERON) tablet 15 mg  15 mg Oral QHS  balsam peru-castor oil (VENELEX) ointment   Topical TID  tamsulosin (FLOMAX) capsule 0.4 mg  0.4 mg Oral DAILY  aspirin chewable tablet 81 mg  81 mg Oral DAILY  sildenafil (antihypertensive) (REVATIO) tablet 20 mg  20 mg Oral TID  pantoprazole (PROTONIX) tablet 40 mg  40 mg Oral ACB  insulin lispro (HUMALOG) injection   SubCUTAneous TIDAC  insulin lispro (HUMALOG) injection   SubCUTAneous AC&HS  Warfarin MD/NP dosing   Other PRN  
 epoetin yvonne-epbx (RETACRIT) injection 20,000 Units  20,000 Units SubCUTAneous Q7D  
 0.9% sodium chloride infusion 250 mL  250 mL IntraVENous PRN  
 ceFAZolin (ANCEF) 1 g in 0.9% sodium chloride (MBP/ADV) 50 mL  1 g IntraVENous Q8H  
 dronabinol (MARINOL) capsule 2.5 mg  2.5 mg Oral ACB&D  
 bivalirudin (ANGIOMAX) 250 mg in 0.9% sodium chloride (MBP/ADV) 50 mL  0.02-2.5 mg/kg/hr IntraVENous TITRATE  lidocaine (LIDODERM) 5 % patch 1 Patch  1 Patch TransDERmal Q24H  
 0.9% sodium chloride infusion  9 mL/hr IntraVENous CONTINUOUS  
 0.45% sodium chloride infusion  10 mL/hr IntraVENous CONTINUOUS  
 sodium chloride (NS) flush 5-40 mL  5-40 mL IntraVENous Q8H  
 sodium chloride (NS) flush 5-40 mL  5-40 mL IntraVENous PRN  
 acetaminophen (TYLENOL) tablet 650 mg  650 mg Oral Q6H PRN  
 naloxone (NARCAN) injection 0.4 mg  0.4 mg IntraVENous PRN  
 ondansetron (ZOFRAN) injection 4 mg  4 mg IntraVENous Q4H PRN  
 albuterol-ipratropium (DUO-NEB) 2.5 MG-0.5 MG/3 ML  3 mL Nebulization Q6H PRN  
 senna-docusate (PERICOLACE) 8.6-50 mg per tablet 1 Tab  1 Tab Oral DAILY  polyethylene glycol (MIRALAX) packet 17 g  17 g Oral DAILY  bisacodyl (DULCOLAX) tablet 5 mg  5 mg Oral DAILY PRN  
 sucralfate (CARAFATE) tablet 1 g  1 g Oral AC&HS  
 0.9% sodium chloride infusion 250 mL  250 mL IntraVENous PRN  
 influenza vaccine 2019-20 (6 mos+)(PF) (FLUARIX/FLULAVAL/FLUZONE QUAD) injection 0.5 mL  0.5 mL IntraMUSCular PRIOR TO DISCHARGE  
  hydrALAZINE (APRESOLINE) 20 mg/mL injection 20 mg  20 mg IntraVENous Q6H PRN  
 morphine injection 4 mg  4 mg IntraVENous Q2H PRN  
 dextrose 10 % infusion 125-250 mL  125-250 mL IntraVENous PRN  
 milrinone (PRIMACOR) 20 MG/100 ML D5W infusion  0.375 mcg/kg/min IntraVENous TITRATE  insulin regular (NOVOLIN R, HUMULIN R) 100 Units in 0.9% sodium chloride 100 mL infusion  1-50 Units/hr IntraVENous TITRATE  ELECTROLYTE REPLACEMENT NOTE: Nurse to review Serum Potassium and Magnesuim levels and Initiate Electrolyte Replacement Protocol as needed  1 Each Other PRN No Known Allergies Objective: 
Vitals:   
Vitals:  
 11/24/19 0800 11/24/19 0900 11/24/19 1000 11/24/19 1100 BP:      
Pulse: (!) 112 (!) 120 (!) 112 (!) 104 Resp: 26 20 26 22 Temp: 98.2 °F (36.8 °C) SpO2: 95% 94% 94% Weight:      
Height:      
 
Intake and Output: 
11/24 0701 - 11/24 1900 In: -  
Out: 505 [Urine:325; Drains:180] 11/22 1901 - 11/24 0700 In: 1766.3 [P.O.:840; I.V.:926.3] Out: 2112 [Urine:2870; Drains:680] Physical Examination: 
 
General: On vent Neck:  Lines + Resp:  Increased Rales B/L  
CV:  VADsounds  3-4+ b/l LE edema GI:  Soft, NT, + Bowel sounds Neurologic:  AAO X3  
:  No neal [x]    High complexity decision making was performed 
[x]    Patient is at high-risk of decompensation with multiple organ involvement Lab Data Personally Reviewed: I have reviewed all the pertinent labs, microbiology data and radiology studies during assessment. Recent Labs  
  11/24/19 
0432 11/23/19 
0409 11/22/19 
0403 * 132* 132* K 3.3* 3.2* 3.5 CL 94* 96* 97  
CO2 31 30 27 GLU 96 97 95 BUN 22* 22* 24* CREA 1.08 1.05 1.20 CA 8.2* 8.4* 8.4* MG 1.8 1.9 1.9 ALB 2.2* 2.4* 2.5* SGOT 16 18 23 ALT <6* 7* 8* INR 1.8* 1.8* 1.7* Recent Labs  
  11/24/19 
0432 11/23/19 
0409 11/22/19 
0403 WBC 6.2 6.9 6.5 HGB 9.5* 9.3* 8.7* HCT 30.0* 29.5* 28.0*  
* 130* 105* No results found for: SDES Lab Results Component Value Date/Time Culture result: NO GROWTH 5 DAYS 11/12/2019 11:18 AM  
 Culture result: SERRATIA MARCESCENS (A) 10/31/2019 10:05 AM  
 Culture result: SERRATIA MARCESCENS (2ND COLONY TYPE/STRAIN) (A) 10/31/2019 10:05 AM  
 Culture result: ENTEROCOCCUS FAECALIS GROUP D (A) 10/31/2019 10:05 AM  
 Culture result: NO GROWTH 5 DAYS 10/25/2019 07:14 PM  
 
Recent Results (from the past 24 hour(s)) GLUCOSE, POC Collection Time: 11/23/19 12:13 PM  
Result Value Ref Range Glucose (POC) 169 (H) 65 - 100 mg/dL Performed by Lo Coleman   
PTT Collection Time: 11/23/19  4:11 PM  
Result Value Ref Range aPTT 69.9 (H) 22.1 - 32.0 sec  
 aPTT, therapeutic range     58.0 - 77.0 SECS  
GLUCOSE, POC Collection Time: 11/23/19  4:29 PM  
Result Value Ref Range Glucose (POC) 143 (H) 65 - 100 mg/dL Performed by Lo Coleman GLUCOSE, POC Collection Time: 11/23/19 10:12 PM  
Result Value Ref Range Glucose (POC) 149 (H) 65 - 100 mg/dL Performed by Kody Learyworth METABOLIC PANEL, COMPREHENSIVE Collection Time: 11/24/19  4:32 AM  
Result Value Ref Range Sodium 131 (L) 136 - 145 mmol/L Potassium 3.3 (L) 3.5 - 5.1 mmol/L Chloride 94 (L) 97 - 108 mmol/L  
 CO2 31 21 - 32 mmol/L Anion gap 6 5 - 15 mmol/L Glucose 96 65 - 100 mg/dL BUN 22 (H) 6 - 20 MG/DL Creatinine 1.08 0.70 - 1.30 MG/DL  
 BUN/Creatinine ratio 20 12 - 20 GFR est AA >60 >60 ml/min/1.73m2 GFR est non-AA >60 >60 ml/min/1.73m2 Calcium 8.2 (L) 8.5 - 10.1 MG/DL Bilirubin, total 1.7 (H) 0.2 - 1.0 MG/DL  
 ALT (SGPT) <6 (L) 12 - 78 U/L  
 AST (SGOT) 16 15 - 37 U/L Alk. phosphatase 104 45 - 117 U/L Protein, total 5.6 (L) 6.4 - 8.2 g/dL Albumin 2.2 (L) 3.5 - 5.0 g/dL Globulin 3.4 2.0 - 4.0 g/dL A-G Ratio 0.6 (L) 1.1 - 2.2 MAGNESIUM Collection Time: 11/24/19  4:32 AM  
Result Value Ref Range Magnesium 1.8 1.6 - 2.4 mg/dL LD  
 Collection Time: 11/24/19  4:32 AM  
Result Value Ref Range  (H) 85 - 241 U/L  
LACTIC ACID Collection Time: 11/24/19  4:32 AM  
Result Value Ref Range Lactic acid 1.4 0.4 - 2.0 MMOL/L  
CBC W/O DIFF Collection Time: 11/24/19  4:32 AM  
Result Value Ref Range WBC 6.2 4.1 - 11.1 K/uL  
 RBC 3.12 (L) 4.10 - 5.70 M/uL HGB 9.5 (L) 12.1 - 17.0 g/dL HCT 30.0 (L) 36.6 - 50.3 % MCV 96.2 80.0 - 99.0 FL  
 MCH 30.4 26.0 - 34.0 PG  
 MCHC 31.7 30.0 - 36.5 g/dL  
 RDW 19.2 (H) 11.5 - 14.5 % PLATELET 376 (L) 354 - 400 K/uL MPV 9.9 8.9 - 12.9 FL  
 NRBC 0.0 0  WBC ABSOLUTE NRBC 0.00 0.00 - 0.01 K/uL PROTHROMBIN TIME + INR Collection Time: 11/24/19  4:32 AM  
Result Value Ref Range INR 1.8 (H) 0.9 - 1.1 Prothrombin time 17.9 (H) 9.0 - 11.1 sec PTT Collection Time: 11/24/19  4:32 AM  
Result Value Ref Range aPTT 64.4 (H) 22.1 - 32.0 sec  
 aPTT, therapeutic range     58.0 - 77.0 SECS  
NT-PRO BNP Collection Time: 11/24/19  4:32 AM  
Result Value Ref Range NT pro-BNP 8,187 (H) <125 PG/ML  
POC G3 - PUL Collection Time: 11/24/19  5:26 AM  
Result Value Ref Range pH (POC) 7.450 7.35 - 7.45    
 pCO2 (POC) 39.1 35.0 - 45.0 MMHG  
 pO2 (POC) 88 80 - 100 MMHG  
 HCO3 (POC) 27.1 (H) 22 - 26 MMOL/L  
 sO2 (POC) 97 92 - 97 % Base excess (POC) 3 mmol/L Site RIGHT RADIAL Device: NASAL CANNULA Flow rate (POC) 6 L/M Allens test (POC) YES Specimen type (POC) ARTERIAL Total resp. rate 18 GLUCOSE, POC Collection Time: 11/24/19  6:40 AM  
Result Value Ref Range Glucose (POC) 99 65 - 100 mg/dL Performed by Petar Purcell Total time spent with patient:  xxx   min. Care Plan discussed with: 
Patient Family RN Consulting Physician [id number] [facility],      
 
I have reviewed the flowsheets. Chart and Pertinent Notes have been reviewed. No change in PMH ,family and social history from Consult note.  
 
 
Ida Vera MD

## 2019-11-24 NOTE — PROGRESS NOTES
Problem: Self Care Deficits Care Plan (Adult) Goal: *Acute Goals and Plan of Care (Insert Text) Description FUNCTIONAL STATUS PRIOR TO ADMISSION: Patient was modified independent using a single point cane for functional mobility. Patient able to shower and dress himself. However, patient required assistance for household management from his wife. HOME SUPPORT: The patient lived alone with wife to provide assistance. Occupational Therapy Goals: 
 
Goals reviewed and continued/modified as follows 11/20/19 s/p LVAD implantation 1. Patient will perform upper body dressing including LVAD switchovers with moderate assistance within 7 day(s). 2.  Patient will perform lower body dressing with minimal assistance within 7 day(s). 3.  Patient will perform grooming with supervision/setup within 7 day(s). 4.  Patient will perform toilet transfers supervision/setupmodified independence within 7 day(s). 5.  Patient will perform all aspects of toileting with minimal assistance within 7 day(s). 6.  Patient will participate in upper extremity therapeutic exercise/activities with supervision/set-up for 5 minutes within 7 day(s). 7.  Patient will utilize energy conservation techniques during functional activities with verbal cues within 7 day(s). 8. Patient will verbalize LVAD terminology with verbal cues within 7 day (s). Goals reviewed and continued/modified as follows 11/12/19 1. Patient will perform bathing with supervision/set-up within 7 day(s). 2.  Patient will perform lower body dressing with supervision/set-up within 7 day(s). 3.  Patient will perform grooming with modified independence within 7 day(s). 4.  Patient will perform toilet transfers with modified independence within 7 day(s). 5.  Patient will perform all aspects of toileting with supervision/set-up within 7 day(s).  
6.  Patient will participate in upper extremity therapeutic exercise/activities with supervision/set-up for 5 minutes within 7 day(s). 7.  Patient will utilize energy conservation techniques during functional activities with verbal cues within 7 day(s). 8. Patient will verbalize LVAD terminology with verbal cues within 7 day (s) in preparation for implant. Continue all goals 10/30/19 post re-eval for Impella removal  
Initiated 10/28/2019 1. Patient will perform bathing with supervision/set-up within 7 day(s). 2.  Patient will perform lower body dressing with supervision/set-up within 7 day(s). 3.  Patient will perform grooming with modified independence within 7 day(s). 4.  Patient will perform toilet transfers with modified independence within 7 day(s). 5.  Patient will perform all aspects of toileting with supervision/set-up within 7 day(s). 6.  Patient will participate in upper extremity therapeutic exercise/activities with supervision/set-up for 5 minutes within 7 day(s). 7.  Patient will utilize energy conservation techniques during functional activities with verbal cues within 7 day(s). Outcome: Progressing Towards Goal 
OCCUPATIONAL THERAPY TREATMENT Patient: Donny Shaikh (75 y.o. male) Date: 11/24/2019 Diagnosis: Acute decompens[facility] ([facility] [I50.9] <principal problem not specified> Procedure(s) (LRB): REMOVAL TEMPORARY LEFT VENTRICULAR ASSIST DEVICE, IMPLANTATION OF LEFT VENTRICULAR ASSIST DEVICE PERMANENT (VAD), ECC, JACQUE, EPI AORTIC US BY DR Kiesha Pisano. (Left) 6 Days Post-Op Precautions: Fall, Sternal 
Chart, occupational therapy assessment, plan of care, and goals were reviewed. ASSESSMENT Patient continues with skilled OT services and is progressing towards goals. Today addressed self-feeding with cylindrical foam, opening containers and targeting objects to increase basic and instrumental ADLs, including VAD switchovers. GM and FM, strength, ROM, and endurance are still limiting factors with sitting ADLs. PLAN : 
Patient continues to benefit from skilled intervention to address the above impairments. Continue treatment per established plan of care. to address goals. Recommend with staff: opening his own food containers Recommend next OT session: FM and GM tasks - grooming, tying, buttons, etc.  
 
Recommendation for discharge: (in order for the patient to meet his/her long term goals) Therapy 3 hours per day 5-7 days per week This discharge recommendation: 
Has not yet been discussed the attending provider and/or case management SUBJECTIVE:  
Patient stated I will try.  OBJECTIVE DATA SUMMARY:  
Cognitive/Behavioral Status: 
  
  
  
  
  
  
 
Functional Mobility and Transfers for ADLs: 
Bed Mobility: 
  
 
Transfers: 
  
  
  
 
Balance: ADL Intervention: 
Feeding Container Management: Moderate assistance Cutting Food: Maximum assistance Utensil Management: Minimum assistance Food to Mouth: Minimum assistance Drink to Mouth: Minimum assistance Cues: Verbal cues provided(to allow increased time to target objects) Adaptive Equipment: Built up fork Instruction to allow increased time to target objects. Opened milk carton 1/1, foiled containers 2/2 but the mustard. Patient spilled apple juice and coffee, food falling off fork, and dropped fork. Therapeutic Exercises:  
Patient stating he is completing his sponge GM exercises. Pain: 
 
 
Activity Tolerance:  
requires rest breaks Please refer to the flowsheet for vital signs taken during this treatment. After treatment patient left in no apparent distress:  
Sitting in chair and Call bell within reach COMMUNICATION/COLLABORATION:  
The patients plan of care was discussed with: Physical Therapist and Registered Nurse Yesi Aceves Time Calculation: 30 mins

## 2019-11-24 NOTE — PROGRESS NOTES
Problem: Mobility Impaired (Adult and Pediatric) Goal: *Acute Goals and Plan of Care (Insert Text) Description FUNCTIONAL STATUS PRIOR TO ADMISSION: Patient was modified independent using a single point cane for functional mobility. Patient reports an increasingly sedentary lifestyle 2* fatigue and SOB/dyspnea. Retired (so is his wife). Patient reports x 3 falls within the last couple of weeks. Patient is wearing either nasal cannula or CPAP at night. LVAD work-up has been initiated. HOME SUPPORT PRIOR TO ADMISSION: The patient lived with his wife, but did not require physical assistance. Physical Therapy Goals: 
 
Re-evaluation completed 11/20/2019 and new goals formulated following LVAD implantation. 1.  Patient will move from supine to sit and sit to supine, scoot up and down and roll side to side in bed with minimal assistance/contact guard assist within 7 days. 2.  Patient will perform sit to/from stand with minimal assistance/contact guard assist within 7 days. 3.  Patient will ambulate 150 feet with least restrictive assistive device and minimal assistance/contact guard assist within 7 days. 4.  Patient will ascend/descend 4 stairs with handrail(s) with minimal assistance/contact guard assist within 7 days. 5.  Patient will perform cardiac exercises per protocol with supervision within 7 days. 6.  Patient will verbally and functionally recall 3/3 sternal precautions within 7 days. 7.  Patient will perform power exchange for power module to/from battery with moderate assistance  within 7 days. Initiated 10/27/2019; updated 11/15/2019 1. Patient will move from supine to sit and sit to supine, scoot up and down and roll side to side in bed with independence within 7 days. 2.  Patient will perform sit to/from stand with supervision/set-up within 7 days. 3.  Patient will ambulate 200 feet with least restrictive assistive device and supervision/set-up within 7 days. 4.  Patient will ascend/descend 4 stairs with  handrail(s) with supervision/set-up within 7 days for functional strengthening and community reintegration. Elza Cornelius 5.  Patient will verbally and functionally recall 3/3 sternal precautions within 7 days in preparation for LVAD implantation. 6.  Patient will perform a mock power exchange for power module to/from battery with supervision/set-up within 7 days in preparation for LVAD implantation. 1.  Patient will move from supine to sit and sit to supine, scoot up and down and roll side to side in bed with independence within 7 days. 2.  Patient will perform sit to/from stand with supervision/set-up within 7 days. 3.  Patient will ambulate 150 feet with least restrictive assistive device and supervision/set-up within 7 days. 4.  Patient will ascend/descend 4 stairs with  handrail(s) with supervision/set-up within 7 days for functional strengthening and community reintegration. Elza Cornelius 5.  Patient will verbally and functionally recall 3/3 sternal precautions within 7 days in preparation for LVAD implantation. 6.  Patient will perform a mock power exchange for power module to/from battery with supervision/set-up within 7 days in preparation for LVAD implantation. Outcome: Progressing Towards Goal 
 PHYSICAL THERAPY TREATMENT Patient: Martha Gallagher (75 y.o. male) Date: 11/24/2019 Diagnosis: [facility] [I50.9] <principal problem not specified> Procedure(s) (LRB): REMOVAL TEMPORARY LEFT VENTRICULAR ASSIST DEVICE, IMPLANTATION OF LEFT VENTRICULAR ASSIST DEVICE PERMANENT (VAD), ECC, JACQUE, EPI AORTIC US BY DR Floyd Palacios. (Left) 6 Days Post-Op Precautions: Fall, Sternal 
Chart, physical therapy assessment, plan of care and goals were reviewed. ASSESSMENT Patient continues with skilled PT services and is progressing towards goals. Patient received sitting in chair, agreeable to therapy.  Remains motivated but appeared and voiced frustration with his slow progress this date. Anxious in standing and when attempting gait, asking for RW. Able to ambulate around the bed in his room with HHA x2. Dynamic balance actually somewhat better than static standing balance (tendency towards retropulsion but with forward flexed posture). During seated rest, performed exercises as below (max tactile and concurrent visual demonstration for appropriate technique). Reinforced LVAD terminology which he continues to struggle with (driveline,  ). He could point out the driveline but could not name it. Current Level of Function Impacting Discharge (mobility/balance): mod Ax2 for gait, max Ax2 bed mobility Other factors to consider for discharge: previously independent although becoming weak at home PTA PLAN : 
Patient continues to benefit from skilled intervention to address the above impairments. Continue treatment per established plan of care. to address goals. Recommendation for discharge: (in order for the patient to meet his/her long term goals) Therapy 3 hours per day 5-7 days per week This discharge recommendation: 
Has not yet been discussed the attending provider and/or case management IF patient discharges home will need the following DME: to be determined (TBD) SUBJECTIVE:  
Patient stated I'm just upset I can perform like I should.  OBJECTIVE DATA SUMMARY:  
Cardiac diagnosis intervention: The patient stated 3/3 sternal precautions when prompted. Reviewed the \"3 Ps\" with patient. The patient required min cues to maintain sternal precautions during functional activity. Critical Behavior: 
Neurologic State: Appropriate for age Orientation Level: Oriented X4 Cognition: Appropriate for age attention/concentration Safety/Judgement: Decreased insight into deficits Functional Mobility Training: 
Bed Mobility: 
Sit to Supine: Maximum assistance;Assist x2 
 Scooting: Contact guard assistance Transfers: 
Sit to Stand: Minimum assistance; Moderate assistance;Assist x2 Stand to Sit: Minimum assistance;Assist x2 Balance: 
Sitting: Intact; Without support Standing: Impaired; With support Standing - Static: Poor Standing - Dynamic : Poor Ambulation/Gait Training: 
Distance (ft): 8 Feet (ft) Assistive Device: Gait belt(HHA x2) Ambulation - Level of Assistance: Moderate assistance;Assist x2 Gait Abnormalities: Decreased step clearance; Path deviations; Shuffling gait VAD power transition Patient participated in unscrewing and unplugging + plugging and screwing in power leads for fine motor repetition Completed the following tasks: 
 Independent Verbal cues Physical assist Comments Disconnect power leads   x Cues to pull directly out, patient \"wiggling\" back and forth increasing likelihood of bending prongs Therapeutic Exercises:  
 
 
CARDIAC EXERCISE Sets Reps Active  Active Assist   
Passive  Self ROM Comments Scapular elevation  1  5  [x]                             []                              []                             []                               
Scapular retraction  1  5  []                             [x]                             []                             []                               
 
 
Activity Tolerance:  
Fair, requires frequent rest breaks, and observed SOB with activity Please refer to the flowsheet for vital signs taken during this treatment. After treatment patient left in no apparent distress:  
Supine in bed, Heels elevated for pressure relief, and Side rails x 3 
 
COMMUNICATION/COLLABORATION:  
The patients plan of care was discussed with: Registered Nurse Claire Sharma, PT, DPT Time Calculation: 24 mins

## 2019-11-24 NOTE — PROGRESS NOTES
2000- Bedside and Verbal shift change report given to [address] (oncoming nurse) by Jose Sunshine RN (offgoing nurse). Report included the following information SBAR, Procedure Summary, Intake/Output, Recent Results and Cardiac Rhythm Paced. 0000- Pt reassessed. No change. 0400- Pt reassessed. No change. Labs sent. 0600- While bathing patient, noticed what looked like a clot in the upper junction of the chest tubes. Manipulated chest tubes to attempt to dislodge clot with success. Pt put out 400 of serous fluid into pleurovac. No hemodynamic changes after fluid shift. 0800- Bedside and Verbal [facility] by [facility] RN (offgoing nurse). Report included the following information SBAR, Procedure Summary, Intake/Output, Recent Results and Cardiac Rhythm Paced.

## 2019-11-24 NOTE — PROGRESS NOTES
ID Progress Note 
2019 Subjective:  
 
Remains off vent, looks good Objective: Antibiotics: 1. Levaquin 2. Rifampin 3. Fluconazole 4. Vancomycin 5. Cefazolin Vitals:  
Visit Vitals BP 93/71 (BP 1 Location: Right arm, BP Patient Position: At rest) Comment: map 66 Pulse (!) 104 Temp 98.2 °F (36.8 °C) Resp 22 Ht 6' 2\" (1.88 m) Wt 92.9 kg (204 lb 12.8 oz) SpO2 94% BMI 26.29 kg/m² Tmax:  Temp (24hrs), Av.3 °F (36.8 °C), Min:97.7 °F (36.5 °C), Max:99 °F (37.2 °C) Exam:  Lungs:  clear to auscultation bilaterally Heart:  regular rate and rhythm Abdomen:  soft, non-tender. Bowel sounds normal. No masses,  no organomegaly Urine is clearing up Labs:     
Recent Labs  
  19 
0432 19 
0409 19 
0403 WBC 6.2 6.9 6.5 HGB 9.5* 9.3* 8.7* * 130* 105* BUN 22* 22* 24* CREA 1.08 1.05 1.20 SGOT 16 18 23  93 90 TBILI 1.7* 2.2* 2.9* Cultures: No results found for: [facility] [place] Lab Results Component Value Date/Time Culture result: NO GROWTH 5 DAYS 2019 11:18 AM  
 Culture result: SERRATIA MARCESCENS (A) 10/31/2019 10:05 AM  
 Culture result: SERRATIA MARCESCENS (2ND COLONY TYPE/STRAIN) (A) 10/31/2019 10:05 AM  
 Culture result: ENTEROCOCCUS FAECALIS GROUP D (A) 10/31/2019 10:05 AM  
 
 
Radiology:  
 
Line/Insert Date:        
 
Assessment:  
 
1. UTI--Serratia (2 biotypes) from culture and small amount of Enterococcus 2. CHF/cardiomyopathy Objective: 1. Continue current therapy 2. LVAD 3. D/W family at bedside Natacha Colunga MD

## 2019-11-24 NOTE — PROGRESS NOTES
[facility] Progress Note DOS:   11/24/2019 NAME:  Christoph Butterfield MRN:   [id number] REFERRING PROVIDER:  Dr. Silvia Hernandez PRIMARY CARE PHYSICIAN: Amara Diaz MD 
 
 
Chief Complaint:  
Cardiogenic Shock HPI: Roberta Kiser is a 71y.o. year old pleasant white male with a history of HTN, HLD, JOSE, CAD s/p cardiac arrest VFib s/p CABG (2011) c/b sternal would infection and sternectomy, ischemic cardiomyopathy LVEF 15-20%, s/p ICD and with LBBB. He was admitted with acute on chronic systolic heart failure with massive volume overload > 20 lbs, in the setting of atrial fibrillation s/p failed DCCV and underwent DCCV and RHC on 6/18.  S/p BiVICD on 6/21/19 with Dr. Elena Calvillo. He was discharged home home on IV milrinone on 6/26/19. [facility] has been followed closely by Dr. Silvia Hernandez and the [facility]. 
  
Mr. Kiser was admitted for acute on chronic systolic heart failure. He underwent implantation of Impella 5.0 due to CS and has completed his LVAD evaluation. He meets criteria for implant of HM3 as DT. He was NYHA Class IV prior to implant of Impella 5.0, has LVEF < 15%, was intolerant of GDMT due to symptomatic hypotension and renal dysfunction. He remains dependent on temporary MCS support. RHC  revealed compensated hemodynamics on Impella. His renal function has improved dramatically with improvement in his hemodynamics. He is not a suitable transplant candidate due to single kidney, sternectomy, debility, and frailty. He was reviewed by [facility] and felt to be a high risk heart transplant candidate due to multiple co-morbidities as well as the afore mentioned conditions. He remained in the CCU and underwent a HM 3 implant as DT on 11/18. 24Hr Events Negative fluid balance Increased BLL edema Working with PT/OT Procedure:  Procedure(s): REMOVAL TEMPORARY LEFT VENTRICULAR ASSIST DEVICE, IMPLANTATION OF LEFT VENTRICULAR ASSIST DEVICE PERMANENT (VAD), ECC, JACQUE, EPI AORTIC US BY  WOOD.    
POD:  6 Impression / Plan:  
Heart Failure Status: NYHA Class IV INTERMACS Category 2 Chronic systolic heart failure due to ICM, NYHA Class IV, EF < 15%, cardiogenic shock on Impella 5.0 support S/p Impella removal and LVAD implant Goal CVP < 15 mmHG Increse milrinone to 0.375mcg/kg/min for now Cont Sildenafil today Start IV bumex infusion No AA/ARB/ARNI post op- will start as appropriate Anticoagulation for LVAD, INR goal 2-3 Hold ASA for platelets INR 1.8 Cont coumadin tonight 2.5 mg 
Cont Bivalrudin until INR > 2 
  
Acute Respiratory Failure post op Resolved Pulmonary hygiene Wean NC for sats > 92% Cont home CPAP - wife bringing back up mask Post op Pain PRN oxycodone Comfort measures L Brachial Artery Thrombosis On bivalrudin and warfarin Pain improved Vascular following Brachial artery thrombectomy planned for Monday 11/25 CKD 3, atrophic left kidney Appreciate Nephrology assistance Assess diuretic dosing daily Start IV bumex infusion Avoid nephrotoxins Trend labs  
  
CAD s/p CABG Resume low dose ASA- watch platelets No BB while on dobutamine Hold statin due to recent hepatic failure LHC performed 11/13 - low likelihood of benefit from revascularization  
  
Hx of VF arrest s/p BiV-ICD No recent shocks Keep K > 4 and Mg > 2  
   
PAF Currently in NSR, Paced Cont PO Amio 
  
Urinary retention, c/b serratia UTI and hematuria Appreciate Urology consult Cystoscopy performed 11/13 shows catheter induced cystitis  
  
Malnutrition Appreciate Nutritionist consult Prealbumin improved to 19 Cont Marinol  And remeron 
  
COPD Appreciate Pulmonology assistance Continue nebs PRN   
PFTs complete 
  
Anemia Multifactorial, due to hemolysis + epistaxis + hematuria Intolerant of octreotide or doxycycline for AVM ppx due to prolonged QTc Transfuse for Hgb goal > 8.5 prior to OR Hgb stable - monitor   
  
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  
  
Hx of sternal wound infection, sternectomy Dr. Rin Fraga has reviewed CTs Sternum closed post op- monitor  
  
Vocal cord paralysis Improved Speech therapy following May need MBS Debility Continue PT/OT  
  
Ppx Protonix PT/OT when able Post op antibiotics per routine Will resume Ancef for 2 weeks post op per Dr. Rin Fraga for Impella prophylaxis Bowel regimen Cont Mechanical soft PICC team 
DC pa cath Dispo: 
Remain in [facility] for now, hopefully transfer tomorrow to [facility] Thank you for allowing us to participate in your patient's care. Mounika Holley MD, Chas Moss Chief of Cardiology, [facility] Medical Director Calos Rueda [address], [address] Office [phone] Fax [phone] History: 
Past Medical History:  
Diagnosis Date  Degenerative disc disease, lumbar  Heart failure ([address]  High cholesterol  Hypertension  Paroxysmal atrial fibrillation ([facility] [address] 4/2/2019  Spinal stenosis Past Surgical History:  
Procedure Laterality Date  COLONOSCOPY Left 11/11/2019 COLONOSCOPY at bedside performed by Bryon Perez MD at [address]  HX CORONARY ARTERY BYPASS GRAFT    
 triple  HX HERNIA REPAIR    
 HX IMPLANTABLE CARDIOVERTER DEFIBRILLATOR  DE CARDIOVERSION ELECTIVE ARRHYTHMIA EXTERNAL N/A 6/10/2019 EP CARDIOVERSION [facility] [address] CATH LAB  DE CARDIOVERSION ELECTIVE ARRHYTHMIA EXTERNAL N/A 6/18/2019 EP CARDIOVERSION performed by Zeke Maldonado MD at [facility] [address] CATH LAB  DE INSJ/RPLCMT PERM DFB W/TRNSVNS LDS 1/DUAL CHMBR N/A 6/21/2019 INSERT ICD BIV MULTI performed [facility] Shona Anderson [facility] [address] CATH LAB  DE TCAT IMPL WRLS P-ART PRS SNR L-T HEMODYN MNTR N/A 9/18/2019 IMPLANT HEART FAILURE MONITORING DEVICE performed by Aurora Lucio MD at [address]  CATH LAB Social History Socioeconomic History  Marital status:  Spouse name: Not on file  Number of children: Not on file  Years of education: Not on file  Highest education level: Not on file Occupational History  Not on file Social Needs  Financial resource strain: Not on file  Food insecurity:  
  Worry: Not on file Inability: Not on file  Transportation needs:  
  Medical: Not on file Non-medical: Not on file Tobacco Use  Smoking status: Former Smoker Last attempt to quit: 2010 Years since quittin.9  Smokeless tobacco: Never Used Substance and Sexual Activity  Alcohol use: Not Currently Comment: rarely  Drug use: Never  Sexual activity: Not on file Lifestyle  Physical activity:  
  Days per week: Not on file Minutes per session: Not on file  Stress: Not on file Relationships  Social connections:  
  Talks on phone: Not on file Gets together: Not on file Attends Holiness service: Not on file Active member of club or organization: Not on file Attends meetings of clubs or organizations: Not on file Relationship status: Not on file  Intimate partner violence:  
  Fear of current or ex partner: Not on file Emotionally abused: Not on file Physically abused: Not on file Forced sexual activity: Not on file Other Topics Concern [address] Service Not Asked  Blood Transfusions Not Asked  Caffeine Concern Not Asked  Occupational Exposure Not Asked Debe Richards Hazards Not Asked  Sleep Concern Not Asked  Stress Concern Not Asked  Weight Concern Not Asked  Special Diet Not Asked  Back Care Not Asked  Exercise Not Asked  Bike Helmet Not Asked  Seat Belt Not Asked  Self-Exams Not Asked Social History Narrative  Not on file Family History Problem Relation Age of Onset  Lung Disease Mother  Hypertension Mother Anya.Bilneftali Arthritis-osteo Mother  Heart Disease Mother  Heart Disease Father  Diabetes Father Current Medications:  
Current Facility-Administered Medications Medication Dose Route Frequency Provider Last Rate Last Dose  magnesium sulfate 1 g/100 ml IVPB (premix or compounded)  1 g IntraVENous ONCE Colby Lozano  mL/hr at 11/24/19 0909 1 g at 11/24/19 0375  potassium chloride 20 mEq in 50 ml IVPB  20 mEq IntraVENous Q1H Colby Lozano MD 25 mL/hr at 11/24/19 0909 20 mEq at 11/24/19 0144  magnesium oxide (MAG-OX) tablet 400 mg  400 mg Oral BID Albert Woods NP   400 mg at 11/24/19 3812  potassium chloride SR (KLOR-CON 10) tablet 40 mEq  40 mEq Oral DAILY Albert Woods NP   40 mEq at 11/24/19 1718  bumetanide (BUMEX) injection 3 mg  3 mg IntraVENous TID Kasi QUEZADA MD   3 mg at 11/24/19 0910  
 amiodarone (CORDARONE) tablet 200 mg  200 mg Oral DAILY Christy Aranda MD   200 mg at 11/24/19 5024  mirtazapine (REMERON) tablet 15 mg  15 mg Oral QHS Albert Woods NP   15 mg at 11/23/19 2118  balsam peru-castor oil (VENELEX) ointment   Topical TID Colby Lozano MD      
 [facility]) capsule 0.4 mg  0.4 mg Oral DAILY Logan Kincaid MD   0.4 mg at 11/24/19 7457  aspirin chewable tablet 81 mg  81 mg Oral DAILY Levi Shana B, NP   81 mg at 11/24/19 0910  
 sildenafil (antihypertensive) (REVATIO) tablet 20 mg  20 mg Oral TID Margrette Frank BHAKTA NP   20 mg at 11/24/19 8232  pantoprazole (PROTONIX) tablet 40 mg  40 mg Oral ACB Levi Shana B, NP   40 mg at 11/24/19 2279  
 insulin lispro (HUMALOG) injection   SubCUTAneous TIDAC Levi, Shana B, NP   Stopped at 11/21/19 1630  
 insulin lispro (HUMALOG) injection   SubCUTAneous AC&HS Margrette Ng B, NP   Stopped at 11/23/19 3763  Warfarin MD/NP dosing   Other PRN Ezio Altman MD      
 epoetin yvonne-epbx (RETACRIT) injection 20,000 Units  20,000 Units SubCUTAneous Q7D Logan Austin MD   20,000 Units at 11/19/19 0924  
 0.9% sodium chloride infusion 250 mL  250 mL IntraVENous PRN Amauri Wagner MD      
 ceFAZolin (ANCEF) 1 g in 0.9% sodium chloride (MBP/ADV) 50 mL  1 g IntraVENous Q8H Shana Sims  mL/hr at 11/24/19 0815 1 g at 11/24/19 0815  dronabinol (MARINOL) capsule 2.5 mg  2.5 mg Oral ACB&D Camilla BHAKTA NP   2.5 mg at 11/24/19 9415  bivalirudin (ANGIOMAX) 250 mg in 0.9% sodium chloride (MBP/ADV) 50 mL  0.02-2.5 mg/kg/hr IntraVENous TITRATE Camilla BHAKTA NP 4 mL/hr at 11/24/19 0813 0.2198 mg/kg/hr at 11/24/19 0813  lidocaine (LIDODERM) 5 % patch 1 Patch  1 Patch TransDERmal Q24H Ivy Sims NP   1 Patch at 11/23/19 1835  
 0.9% sodium chloride infusion  9 mL/hr IntraVENous CONTINUOUS Polliard, Marliss Siemens, NP   Stopped at 11/22/19 1400  
 0.45% sodium chloride infusion  10 mL/hr IntraVENous CONTINUOUS Polliard, Marliss Siemens NP   Stopped at 11/22/19 1736  sodium chloride (NS) flush 5-40 mL  5-40 mL IntraVENous Q8H Polliard, Joe LOPEZ NP   10 mL at 11/24/19 0637  
 sodium chloride (NS) flush 5-40 mL  5-40 mL IntraVENous PRN Polliard, Marliss Siemens NP      
 acetaminophen (TYLENOL) tablet 650 mg  650 mg Oral Q6H PRN Polliard, Marliss Siemens, NP   650 mg at 11/24/19 0910  
 oxyCODONE IR (ROXICODONE) tablet 5 mg  5 mg Oral Q4H PRN Polliard, Marliss Siemens, NP   5 mg at 11/19/19 2207  oxyCODONE IR (ROXICODONE) tablet 10 mg  10 mg Oral Q4H PRN Polliard, Marliss Siemens, NP   10 mg at 11/23/19 2118  naloxone (NARCAN) injection 0.4 mg  0.4 mg IntraVENous PRN Polliard, Marliss Siemens, NP      
 [facility]etron [facility]) injection 4 mg  4 mg IntraVENous Q4H PRN Polliard, Marliss Siemens, TIERRA   4 mg at 11/20/19 2000  
 albuterol-ipratropium (DUO-NEB) 2.5 MG-0.5 MG/3 ML  3 mL Nebulization Q6H PRN Aracelis Howell NP      
  senna-docusate (PERICOLACE) 8.6-50 mg per tablet 1 Tab  1 Tab Oral DAILY Sarah Herrera NP   1 Tab at 11/24/19 4427  polyethylene glycol (MIRALAX) packet 17 g  17 g Oral DAILY Sarah Herrera NP   17 g at 11/24/19 0910  
 bisacodyl (DULCOLAX) tablet 5 mg  5 mg Oral DAILY PRN Sarah Herrera NP      
 sucralfate (CARAFATE) tablet 1 g  1 g Oral AC&HS Tacey Boas, NP   1 g at 11/24/19 4813  
 0.9% sodium chloride infusion 250 mL  250 mL IntraVENous PRN Christopher Walden MD      
 influenza vaccine 2019-20 (6 mos+)(PF) (FLUARIX/FLULAVAL/FLUZONE QUAD) injection 0.5 mL  0.5 mL IntraMUSCular PRIOR TO DISCHARGE Jasmina Altman MD      
 hydrALAZINE (APRESOLINE) 20 mg/mL injection 20 mg  20 mg IntraVENous Q6H PRN Shana Sims NP   20 mg at 11/19/19 0547  morphine injection 4 mg  4 mg IntraVENous Q2H PRN Shana Sims NP   4 mg at 11/22/19 0007  dextrose 10 % infusion 125-250 mL  125-250 mL IntraVENous PRN Mouinka Altman MD   Stopped at 11/18/19 0700  
 milrinone (PRIMACOR) 20 MG/100 ML D5W infusion  0.3 mcg/kg/min IntraVENous TITRATE Mounika Altman MD 8 mL/hr at 11/24/19 0813 0.3 mcg/kg/min at 11/24/19 0813  
 insulin regular (NOVOLIN R, HUMULIN R) 100 Units in 0.9% sodium chloride 100 mL infusion  1-50 Units/hr IntraVENous TITRATE Sarah Herrera NP   Stopped at 11/21/19 7285  ELECTROLYTE REPLACEMENT NOTE: Nurse to review Serum Potassium and Magnesuim levels and Initiate Electrolyte Replacement Protocol as needed  1 Each Other PRN Tacey Boas, NP Allergies: No Known Allergies ROS:   
Review of Systems Constitutional: Negative for chills, fever and malaise/fatigue. HENT: Positive for hearing loss. Respiratory: Negative for cough, sputum production and shortness of breath. Cardiovascular: Positive for leg swelling. Negative for chest pain. Gastrointestinal: Negative for heartburn, nausea and vomiting. Musculoskeletal: Negative for myalgias. Skin: Negative. Neurological: Positive for weakness. Negative for dizziness and headaches. Endo/Heme/Allergies: Negative. Psychiatric/Behavioral: Negative. Physical Exam:  
Physical Exam 
Vitals signs and nursing note reviewed. Constitutional:   
   Appearance: He is well-developed and well-nourished. HENT:  
   Head: Normocephalic. Eyes:  
   Pupils: Pupils are equal, round, and reactive to light. Neck: Musculoskeletal: Normal range of motion and neck supple. Vascular: No JVD. Cardiovascular:  
   Rate and Rhythm: Normal rate and regular rhythm. Heart sounds: Normal heart sounds. Comments: + VAD hum Pulmonary:  
   Effort: Pulmonary effort is normal.  
   Breath sounds: Normal breath sounds. Abdominal:  
   General: Bowel sounds are normal. There is no distension. Palpations: Abdomen is soft. Musculoskeletal: Normal range of motion. General: Edema present. Comments: bilaterally Skin: 
   General: Skin is warm and dry. Neurological:  
   Mental Status: He is alert and oriented to person, place, and time. Vitals:  
Visit Vitals BP 93/71 (BP 1 Location: Right arm, BP Patient Position: At rest) Pulse (!) 120 Temp 98.2 °F (36.8 °C) Resp 20 Ht 6' 2\" (1.88 m) Wt 204 lb 12.8 oz (92.9 kg) SpO2 94% BMI 26.29 kg/m² Temp (24hrs), Av.3 °F (36.8 °C), Min:97.7 °F (36.5 °C), Max:99 °F (37.2 °C) Hemodynamics: 
 CO: CO (l/min): 5.4 l/min CI: CI (l/min/m2): 2.5 l/min/m2 CVP: CVP (mmHg): 15 mmHg (19 1400) SVR: SVR (dyne*sec)/cm5: 1067 (dyne*sec)/cm5 (19 1200) PAP Systolic: PAP Systolic: 50 (71// 6297) PAP Diastolic: PAP Diastolic: 30 ( 9689) PVR:   
 SV02: SVO2 (%): 45 % (19 1200) SCV02: SCVO2 (%): 75 % (10/29/19 1900) Admission Weight: Last Weight Weight: 192 lb 10.9 oz (87.4 kg) Weight: 204 lb 12.8 oz (92.9 kg) Intake / Output / Drain: 
Last 24 hrs.:  
 
Intake/Output Summary (Last 24 hours) at 11/24/2019 1001 Last data filed at 11/24/2019 0900 Gross per 24 hour Intake 848.33 ml Output 2230 ml Net -1381.67 ml  
 
 
Drains:  
24h: 235 8h: 595 Extubation Date / Time:  11/19/19 at 1030am 
 
Oxygen Therapy: 
Oxygen Therapy O2 Sat (%): 94 % (11/24/19 0900) Pulse via Oximetry: 104 beats per minute (11/22/19 1400) O2 Device: Nasal cannula (11/24/19 0400) O2 Flow Rate (L/min): 6 l/min (11/24/19 0400) FIO2 (%): 50 % (11/19/19 1035) VAD Data: LVAD (Heartmate) Pump Speed (RPM): 5200 Pump Flow (LPM): 4.7 PI (Pulsitility Index): 3.1 Power: 3.6 MAP: 72  Test: Yes 
Back Up  at Bedside & Labeled: Yes Power Module Test: Yes Driveline Site Care: No 
Driveline Dressing: Clean, Dry, and Intact Drive Line Exam: 
Stabilization device intact: yes Line inspected for damage: yes Appearance: no edema, erythema, drainage Stabilization Method: anchor to abd Recent Labs:  
Labs Latest Ref Rng & Units 11/24/2019 11/23/2019 11/22/2019 11/21/2019 11/20/2019 11/19/2019 11/19/2019 WBC 4.1 - 11.1 K/uL 6.2 6.9 6.5 7.0 6.6 - 3. 9(L) RBC 4.10 - 5.70 M/uL 3.12(L) 3.03(L) 2.84(L) 2.80(L) 2.62(L) - 2.43(L) Hemoglobin 12.1 - 17.0 g/dL 9.5(L) 9.3(L) 8.7(L) 8.6(L) 8.0(L) 7. 9(L) 7. 3(L) Hematocrit 36.6 - 50.3 % 30. 0(L) 29. 5(L) 28. 0(L) 27. 9(L) 25. 9(L) 25. 3(L) 23. 1(L) MCV 80.0 - 99.0 FL 96.2 97.4 98.6 99. 6(H) 98.9 - 95.1 Platelets 908 - 400 K/uL 134(L) 130(L) 105(L) 105(L) 92(L) - 74(L) Lymphocytes 12 - 49 % - - - - - - 18 Monocytes 5 - 13 % - - - - - - 9 Eosinophils 0 - 7 % - - - - - - 0 Basophils 0 - 1 % - - - - - - 1 Albumin 3.5 - 5.0 g/dL 2. 2(L) 2. 4(L) 2. 5(L) 2. 6(L) 2. 7(L) - 2. 7(L) Calcium 8.5 - 10.1 MG/DL 8. 2(L) 8.4(L) 8.4(L) 8.6 8.6 - 8.4(L) SGOT 15 - 37 U/L 16 18 23 40(H) 60(H) - 52(H) Glucose 65 - 100 mg/dL 96 97 95 95 110(H) - 102(H) BUN 6 - 20 MG/DL 22(H) 22(H) 24(H) 23(H) 22(H) - 19 Creatinine 0.70 - 1.30 MG/DL 1.08 1.05 1.20 1.36(H) 1.43(H) - 1.30 Sodium 136 - 145 mmol/L 131(L) 132(L) 132(L) 133(L) 138 - 141 Potassium 3.5 - 5.1 mmol/L 3. 3(L) 3. 2(L) 3.5 4.0 3.9 3.8 3.9 TSH 0.36 - 3.74 uIU/mL - - - - - - -  
PSA 0.01 - 4.0 ng/mL - - - - - - -  
LDH 85 - 241 U/L 310(H) 341(H) 389(H) 460(H) 496(H) - 491(H) CEA ng/mL - - - - - - - Some recent data might be hidden EKG:  
EKG Results Procedure 720 Value Units Date/Time EKG, 12 LEAD, INITIAL [[id number]] Collected:  11/19/19 0417 Order Status:  Completed Updated:  11/19/19 1224 Ventricular Rate 76 BPM   
  Atrial Rate 0 BPM   
  QRS Duration 156 ms   
  Q-T Interval 564 ms QTC Calculation (Bezet) 634 ms Calculated R Axis 81 degrees Calculated T Axis 120 degrees Diagnosis -- Electronic ventricular pacemaker Baseline artifact When compared with ECG of 13-NOV-2019 04:40, 
Vent. rate has increased BY   2 BPM 
Confirmed by Heri Sood M.D., Ruth Gonzalez ([id number]) on 11/19/2019 12:23:52 PM 
  
 EKG, 12 LEAD, INITIAL [[id number]] Order Status:  Canceled EKG, 12 LEAD, INITIAL [[id number]] Order Status:  Canceled EKG, 12 LEAD, INITIAL [[id number]] Order Status:  Canceled EKG, 12 LEAD, INITIAL [[id number]] Order Status:  Canceled EKG, 12 LEAD, INITIAL [[id number]] Order Status:  Canceled EKG, 12 LEAD, INITIAL [[id number]] Collected:  11/13/19 0440 Order Status:  Completed Updated:  11/13/19 2200 Ventricular Rate 74 BPM   
  Atrial Rate 66 BPM   
  QRS Duration 166 ms   
  Q-T Interval 488 ms QTC Calculation (Bezet) 541 ms Calculated R Axis -15 degrees Calculated T Axis 157 degrees Diagnosis -- Electronic ventricular pacemaker When compared with ECG of 11-NOV-2019 04:49, No significant change was found Confirmed by Reid Shah M.D., [facility] ([id number]) on 11/13/2019 10:00:38 PM 
  
 EKG, 12 LEAD, INITIAL [[id number]] Order Status:  Canceled EKG, 12 LEAD, INITIAL [[id number]] Collected:  11/11/19 0449 Order Status:  Completed Updated:  11/11/19 5818 Ventricular Rate 74 BPM   
  Atrial Rate 39 BPM   
  QRS Duration 158 ms Q-T Interval 504 ms QTC Calculation (Bezet) 559 ms Calculated R Axis 13 degrees Calculated T Axis 175 degrees Diagnosis -- Electronic ventricular pacemaker When compared with ECG of 05-NOV-2019 10:44, 
Vent. rate has decreased BY   9 BPM 
Confirmed by Eulalia Barney M.D., Shabana Arciniega ([id number]) on 11/11/2019 8:54:27 AM 
  
 EKG, 12 LEAD, INITIAL [[id number]] Order Status:  Canceled EKG, 12 LEAD, INITIAL [[id number]] Order Status:  Canceled EKG, 12 LEAD, INITIAL [[id number]] Order Status:  Canceled EKG, 12 LEAD, INITIAL [[id number]] Collected:  11/05/19 1044 Order Status:  Completed Updated:  11/05/19 1226 Ventricular Rate 83 BPM   
  Atrial Rate 83 BPM   
  P-R Interval 80 ms QRS Duration 162 ms Q-T Interval 502 ms QTC Calculation (Bezet) 589 ms Calculated R Axis 2 degrees Calculated T Axis -156 degrees Diagnosis -- Electronic ventricular pacemaker Marked T wave abnormality, consider  
anterolateral ischemia When compared with ECG of 02-NOV-2019 10:42, No significant change was found Confirmed by Ryan Saunders M.D., Fany Nunez ([id number]) on 11/5/2019 12:25:52 PM 
  
 EKG, 12 LEAD, INITIAL [[id number]] Collected:  11/02/19 1042 Order Status:  Completed Updated:  11/03/19 6390 Ventricular Rate 91 BPM   
  Atrial Rate 91 BPM   
  P-R Interval 108 ms QRS Duration 148 ms Q-T Interval 524 ms QTC Calculation (Bezet) 644 ms Calculated R Axis 5 degrees Calculated T Axis -152 degrees Diagnosis --  
  Sinus rhythm with short IN with occasional premature ventricular complexes  
and fusion complexes Nonspecific intraventricular block Marked T wave abnormality, consider anterolateral ischemia When compared with ECG of 26-OCT-2019 06:55, Sinus rhythm has replaced Electronic ventricular pacemaker Prolonged QT Confirmed by Monuika Bernard ([id number]) on 11/3/2019 6:35:23 AM 
  
 EKG, 12 LEAD, INITIAL [[id number]] Order Status:  Canceled EKG, 12 LEAD, INITIAL [[id number]] Collected:  10/26/19 0020 Order Status:  Completed Updated:  10/26/19 1807 Ventricular Rate 80 BPM   
  Atrial Rate 76 BPM   
  QRS Duration 174 ms Q-T Interval 462 ms QTC Calculation (Bezet) 532 ms Calculated R Axis 28 degrees Calculated T Axis 150 degrees Diagnosis -- Atrial flutter Left bundle branch block When compared with ECG of 25-OCT-2019 18:20, 
Electronic demand pacing is not noted Confirmed by Jackelyn Buckley ([id number]) on 10/26/2019 6:07:36 PM 
  
 EKG, 12 LEAD, INITIAL [[id number]] Collected:  10/25/19 1820 Order Status:  Completed Updated:  10/26/19 1749 Ventricular Rate 72 BPM   
  Atrial Rate 72 BPM   
  P-R Interval 86 ms QRS Duration 116 ms   
  Q-T Interval 506 ms QTC Calculation (Bezet) 554 ms Calculated P Axis 9 degrees Calculated R Axis -68 degrees Calculated T Axis 10 degrees Diagnosis --  
  undetermied rhythm possible atrial flutter Nonspecific intraventricular conduction delay Ventricular paced rhythm When compared with ECG of 26-JUN-2019 11:39, 
Significant changes have occurred Confirmed by Jackelyn Buckley ([id number]) on 10/26/2019 5:49:02 PM 
  
  
No specialty comments available. 10/25/19 OR ECHO JACQUE INTRAOP  11/18/2019 Narrative See Anesthesia Procedure note for procedure details. Signed by:   
  
Echo Results  (Last 48 hours) None CXR Results  (Last 48 hours)  
          
 11/24/19 0446  XR CHEST PORT Final result Impression:  IMPRESSION: No significant change from prior with tubes and lines as above.   
  
 Narrative:  EXAM: XR CHEST PORT  
   
 INDICATION: post LVAD implant COMPARISON: Chest x-ray 11/23/2019. FINDINGS: A portable AP radiograph of the chest was obtained at 04:07 hours. The  
patient is on a cardiac monitor. Pacemaker and LVAD device remain in stable and  
expected positions. Left and right-sided chest tubes remain in stable and  
expected positions. Right PICC line traverses expected course terminating in the  
mid superior vena cava, unchanged. Small bilateral pleural effusions and basilar  
atelectasis redemonstrated. Lungs are otherwise clear. No pneumothorax. There  
are median sternotomy wires and surgical clips compatible with prior CABG. The  
cardiac silhouette remains enlarged and the mediastinal contours and pulmonary  
vascularity are normal.  The bones and soft tissues are grossly within normal  
limits. 11/23/19 0431  XR CHEST PORT Final result Impression:  IMPRESSION: No significant change following removal of right jugular catheters. Narrative:  EXAM:  XR CHEST PORT. INDICATION: Postop LVAD implant. COMPARISON: 11/22/2019. FINDINGS:   
A portable AP radiograph of the chest was obtained at 0405 hours. There are  
sternal sutures and mediastinal clips and a pacemaker in the left chest. There  
is a left ventricular assist device unchanged in position. Lines and tubes: The patient is on a cardiac monitor and nasal oxygen. The  
right arm PICC line is unchanged in position. The chest tubes and mediastinal  
drain are unchanged in position. The right jugular triple-lumen and [place]  
catheters have been removed. Lungs: There is interstitial edema throughout the lungs with atelectasis at the  
lung bases, left greater than right. Pleura: There is no pneumothorax or pleural effusion. Mediastinum: The cardiac silhouette is enlarged. Bones and soft tissues: The bones and soft tissues are grossly within normal  
limits.   
   
  
  
 
 
 
Lisandra Andrade MD 
 Calos Rueda [address], [address] [place] [address] Office [phone] Fax [phone] 
24 hour VAD/HF Pager: [phone]

## 2019-11-24 NOTE — PROGRESS NOTES
NYHA class IV A/C systolic heart failure Low EF (10%) Inotrope dependent Malnutrition  
LVAD Work-up Epistaxis Hematuria More swelling this am  
 
Going up on diuretics Plan for left brachial thrombectomy Monday Little sleepy / confused today from pain meds PTT therapeutic INR increasing Creatinine normal 
 
Bilirubin and other LFTs improving LDH and lactic acid look reasonable Lactic acid normal 
 
NT pro-BNP coming down CXR - clear lung fields Visit Vitals BP 93/71 (BP 1 Location: Right arm, BP Patient Position: At rest) Comment: map 66 Pulse (!) 104 Temp 98.2 °F (36.8 °C) Resp 22 Ht 6' 2\" (1.88 m) Wt 204 lb 12.8 oz (92.9 kg) SpO2 94% BMI 26.29 kg/m² LVAD Pump Speed (RPM): 5200 Pump Flow (LPM): 4.4 MAP: 74 
PI (Pulsitility Index): 3 Power: 3.6  Test: Yes 
Back Up  at Bedside & Labeled: Yes Power Module Test: Yes Driveline Site Care: No 
Driveline Dressing: Clean, Dry, and Intact Outpatient: No 
MAP in Therapeutic Range (Outpatient): Yes Testing Alarms Reviewed: Yes 
Back up SC speed: 5200 Back up Low Speed Limit: 4800 Emergency Equipment with Patient?: Yes Emergency procedures reviewed?: Yes Drive line site inspected?: No 
Drive line intergrity inspected?: Yes Drive line dressing changed?: No 
 
 
 
Intake/Output Summary (Last 24 hours) at 11/24/2019 1400 Last data filed at 11/24/2019 1100 Gross per 24 hour Intake 594 ml Output 2070 ml Net -1476 ml Visit Vitals BP 93/71 (BP 1 Location: Right arm, BP Patient Position: At rest) Comment: map 66 Pulse (!) 104 Temp 98.2 °F (36.8 °C) Resp 22 Ht 6' 2\" (1.88 m) Wt 204 lb 12.8 oz (92.9 kg) SpO2 94% BMI 26.29 kg/m² Risk of morbidity and mortality - high Medical decision making - high complexity Total critical care time - 30 minutes (CPT 76705)

## 2019-11-25 NOTE — PROGRESS NOTES
Calos Rueda 1721 1540 - transported with patient to OR. 
1600 - pre-procedure MAP (per Anesthesia) - 75 Pump Speed: 5200 Pump Flow: 4.5 PI: 2.9 Power: 3.6 
 
1635 - post procedure MAP (per Anesthesia) - 60 Pump Speed: 5200 Pump Flow: 4.5 PI: 3.5 Power: 3.7 No alarms noted during procedure. 80 - Transported with patient to CVICU. Riley Stone RN 
VAD Coordinator

## 2019-11-25 NOTE — PROGRESS NOTES
Bedside shift change report given to [facility] EDUIN (oncoming nurse) by Gregory Clemens (offgoing nurse). Report included the following information SBAR, Kardex, MAR and Recent Results.

## 2019-11-25 NOTE — PROGRESS NOTES
Problem: Mobility Impaired (Adult and Pediatric) Goal: *Acute Goals and Plan of Care (Insert Text) Description FUNCTIONAL STATUS PRIOR TO ADMISSION: Patient was modified independent using a single point cane for functional mobility. Patient reports an increasingly sedentary lifestyle 2* fatigue and SOB/dyspnea. Retired (so is his wife). Patient reports x 3 falls within the last couple of weeks. Patient is wearing either nasal cannula or CPAP at night. LVAD work-up has been initiated. HOME SUPPORT PRIOR TO ADMISSION: The patient lived with his wife, but did not require physical assistance. Physical Therapy Goals: 
 
Re-evaluation completed 11/20/2019 and new goals formulated following LVAD implantation. 1.  Patient will move from supine to sit and sit to supine, scoot up and down and roll side to side in bed with minimal assistance/contact guard assist within 7 days. 2.  Patient will perform sit to/from stand with minimal assistance/contact guard assist within 7 days. 3.  Patient will ambulate 150 feet with least restrictive assistive device and minimal assistance/contact guard assist within 7 days. 4.  Patient will ascend/descend 4 stairs with handrail(s) with minimal assistance/contact guard assist within 7 days. 5.  Patient will perform cardiac exercises per protocol with supervision within 7 days. 6.  Patient will verbally and functionally recall 3/3 sternal precautions within 7 days. 7.  Patient will perform power exchange for power module to/from battery with moderate assistance  within 7 days. Initiated 10/27/2019; updated 11/15/2019 1. Patient will move from supine to sit and sit to supine, scoot up and down and roll side to side in bed with independence within 7 days. 2.  Patient will perform sit to/from stand with supervision/set-up within 7 days. 3.  Patient will ambulate 200 feet with least restrictive assistive device and supervision/set-up within 7 days. 4.  Patient will ascend/descend 4 stairs with  handrail(s) with supervision/set-up within 7 days for functional strengthening and community reintegration. Sudie Mar 5.  Patient will verbally and functionally recall 3/3 sternal precautions within 7 days in preparation for LVAD implantation. 6.  Patient will perform a mock power exchange for power module to/from battery with supervision/set-up within 7 days in preparation for LVAD implantation. 1.  Patient will move from supine to sit and sit to supine, scoot up and down and roll side to side in bed with independence within 7 days. 2.  Patient will perform sit to/from stand with supervision/set-up within 7 days. 3.  Patient will ambulate 150 feet with least restrictive assistive device and supervision/set-up within 7 days. 4.  Patient will ascend/descend 4 stairs with  handrail(s) with supervision/set-up within 7 days for functional strengthening and community reintegration. Sudie Mar 5.  Patient will verbally and functionally recall 3/3 sternal precautions within 7 days in preparation for LVAD implantation. 6.  Patient will perform a mock power exchange for power module to/from battery with supervision/set-up within 7 days in preparation for LVAD implantation. Outcome: Progressing Towards Goal 
  
PHYSICAL THERAPY TREATMENT Patient: Anabel Herrera (75 y.o. male) Date: 11/25/2019 Diagnosis: Acute decompensated heart failure ([facility] [address] [I50.9] <principal problem not specified> Procedure(s) (LRB): REMOVAL TEMPORARY LEFT VENTRICULAR ASSIST DEVICE, IMPLANTATION OF LEFT VENTRICULAR ASSIST DEVICE PERMANENT (VAD), ECC, JACQUE, EPI AORTIC US BY DR Taylor Ricci. (Left) 7 Days Post-Op Precautions: Fall, Sternal, LVAD - Take doppler pulse on R radial  
Chart, physical therapy assessment, plan of care and goals were reviewed. ASSESSMENT Patient continues with skilled PT services and is slowly progressing towards goals.  Patient remains with an altered COM (worse in static standing than dynamic standing; trunk retropulsion) - patient only able to correct minimally with multimodal cues (plan to utilize mirror next session). Patient continues to require the physical assistance x 2 for functional transfers and short-distance ambulation (x 10 ft); patient unable to safely participate in retro-ambulation (near total assistance). Noted increased dyspnea, orthopnea, and tremulousness this date compared to previous sessions. Continue to anticipate extensive rehabilitation needs. Current Level of Function Impacting Discharge (mobility/balance): Mod-Max A x 2 Other factors to consider for discharge: LVAD, High fall-risk, sternal precautions, need for L UE brachial thrombectomy PLAN : 
Patient continues to benefit from skilled intervention to address the above impairments. Continue treatment per established plan of care. to address goals. Recommendation for discharge: (in order for the patient to meet his/her long term goals) Therapy 3 hours per day 5-7 days per week This discharge recommendation: 
Has been made in collaboration with the attending provider and/or case management IF patient discharges home will need the following DME: to be determined (TBD) SUBJECTIVE:  
Patient stated I'm not feeling too well today.  OBJECTIVE DATA SUMMARY:  
Critical Behavior: 
Neurologic State: Alert, Eyes open spontaneously Orientation Level: Oriented X4 Cognition: Follows commands, Appropriate decision making, Appropriate for age attention/concentration, Appropriate safety awareness Safety/Judgement: Decreased insight into deficits Functional Mobility Training: 
Bed Mobility: 
Sit to Supine: Assist x1;Maximum assistance Transfers: 
Sit to Stand: Assist x2;Minimum assistance; Moderate assistance Stand to Sit: Assist x2;Minimum assistance Bed to Chair: Assist x2;Maximum assistance Balance: 
Sitting: Intact; With support Standing: Impaired; Without support Standing - Static: Poor Standing - Dynamic : Poor Ambulation/Gait Training: 
Distance (ft): 10 Feet (ft) Assistive Device: Gait belt(+ HHA) Ambulation - Level of Assistance: Assist x2; Moderate assistance;Maximum assistance Gait Abnormalities: Decreased step clearance; Path deviations; Shuffling gait;Trunk sway increased; Step to gait(Altered COM) Therapeutic Exercises:  
Standing weight-shifts with emphasis on forward COM displacement Seated LAQs, heel raises, marches Standing tolerance ~ 5 minutes Activity Tolerance:  
Fair, Poor, desaturates with exertion and requires oxygen, and observed SOB with activity Please refer to the flowsheet for vital signs taken during this treatment. After treatment patient left in no apparent distress:  
Supine in bed, Call bell within reach, and Side rails x 3 
 
COMMUNICATION/COLLABORATION:  
The patients plan of care was discussed with: Occupational Therapist, Registered Nurse, and Rehabilitation Attendant Dustin Vega PT, DPT Time Calculation: 39 mins

## 2019-11-25 NOTE — PROGRESS NOTES
[facility] 
 [address] [facility], [address] [facility] Phone: [phone]   [phone]   
  
Nephrology Progress Note Sona Kiser     1950     [id number] Date of Admission : 10/25/2019 
11/25/19 CC:  Follow up for JUAN J, CKD, Hyponatremia Assessment and Plan JUAN J on CKD 
- 2/2 CRS and urinary retention - Cr stable 
- diuresing ok 
- cont bumex drip 
- daily labs Hyponatremia: 
- from CHF 
- cont diuresis 
  
Hypokalemia: 
- replete PRN Gross hematuria, BPH w/ retention: 
- off CBI pre VAD. cysto pre op showed catheter related Cystitis @ postr wall  
- voiding well w/o neal Acute on Chronic HFrEF  
- EF 16-20%, NYHA Class IV , hx of VF arrest - s/p AICD 
- Impella insertion 10/29- removed 11/18  
- s/p LVAD placement on 11/18 
- Inotropes per per promary team 
 
CKD Stage III  
- Mild Left renal atrophy at baseline Hoarseness, vocal cord paralysis, mediastinal adenopathy: 
- will need eventual PET/CT 
  
JOSE on CPAP  
  
PAF s/p DCCV 6/19 
  
Anemia: 
- from blood loss s/p multiple blood products - s/p IV iron,  Repeat iron studies ok 
- on PAVEL q7 d Serratia and Enteroccocus UTI: 
- completed abx Interval History:   
Seen and examined. Feeling ok. For thrombectomy today of L arm. Cr stable, UOP stable. No cp, sob, n/v/d reported. Review of Systems: Pertinent items are noted in HPI. Current Medications:  
Current Facility-Administered Medications Medication Dose Route Frequency  potassium chloride 20 mEq in 50 ml IVPB  20 mEq IntraVENous Q1H PRN  
 magnesium oxide (MAG-OX) tablet 400 mg  400 mg Oral BID  potassium chloride SR (KLOR-CON 10) tablet 40 mEq  40 mEq Oral BID  
 oxyCODONE IR (ROXICODONE) tablet 5 mg  5 mg Oral Q6H PRN  
 bumetanide (BUMEX) 0.25 mg/mL infusion  1 mg/hr IntraVENous CONTINUOUS  
 mirtazapine (REMERON) tablet 15 mg  15 mg Oral QHS  balsam peru-castor oil (VENELEX) ointment   Topical TID  tamsulosin (FLOMAX) capsule 0.4 mg  0.4 mg Oral DAILY  aspirin chewable tablet 81 mg  81 mg Oral DAILY  sildenafil (antihypertensive) (REVATIO) tablet 20 mg  20 mg Oral TID  pantoprazole (PROTONIX) tablet 40 mg  40 mg Oral ACB  insulin lispro (HUMALOG) injection   SubCUTAneous TIDAC  insulin lispro (HUMALOG) injection   SubCUTAneous AC&HS  Warfarin MD/NP dosing   Other PRN  
 epoetin yvonne-epbx (RETACRIT) injection 20,000 Units  20,000 Units SubCUTAneous Q7D  
 0.9% sodium chloride infusion 250 mL  250 mL IntraVENous PRN  
 ceFAZolin (ANCEF) 1 g in 0.9% sodium chloride (MBP/ADV) 50 mL  1 g IntraVENous Q8H  
 dronabinol (MARINOL) capsule 2.5 mg  2.5 mg Oral ACB&D  
 bivalirudin (ANGIOMAX) 250 mg in 0.9% sodium chloride (MBP/ADV) 50 mL  0.02-2.5 mg/kg/hr IntraVENous TITRATE  lidocaine (LIDODERM) 5 % patch 1 Patch  1 Patch TransDERmal Q24H  
 0.9% sodium chloride infusion  9 mL/hr IntraVENous CONTINUOUS  
 0.45% sodium chloride infusion  10 mL/hr IntraVENous CONTINUOUS  
 sodium chloride (NS) flush 5-40 mL  5-40 mL IntraVENous Q8H  
 sodium chloride (NS) flush 5-40 mL  5-40 mL IntraVENous PRN  
 acetaminophen (TYLENOL) tablet 650 mg  650 mg Oral Q6H PRN  
 naloxone (NARCAN) injection 0.4 mg  0.4 mg IntraVENous PRN  
 ondansetron (ZOFRAN) injection 4 mg  4 mg IntraVENous Q4H PRN  
 albuterol-ipratropium (DUO-NEB) 2.5 MG-0.5 MG/3 ML  3 mL Nebulization Q6H PRN  
 senna-docusate (PERICOLACE) 8.6-50 mg per tablet 1 Tab  1 Tab Oral DAILY  polyethylene glycol (MIRALAX) packet 17 g  17 g Oral DAILY  bisacodyl (DULCOLAX) tablet 5 mg  5 mg Oral DAILY PRN  
 sucralfate (CARAFATE) tablet 1 g  1 g Oral AC&HS  
 0.9% sodium chloride infusion 250 mL  250 mL IntraVENous PRN  
 influenza vaccine 2019-20 (6 mos+)(PF) (FLUARIX/FLULAVAL/FLUZONE QUAD) injection 0.5 mL  0.5 mL IntraMUSCular PRIOR TO DISCHARGE  hydrALAZINE (APRESOLINE) 20 mg/mL injection 20 mg  20 mg IntraVENous Q6H PRN  
  dextrose 10 % infusion 125-250 mL  125-250 mL IntraVENous PRN  
 milrinone (PRIMACOR) 20 MG/100 ML D5W infusion  0.375 mcg/kg/min IntraVENous TITRATE  insulin regular (NOVOLIN R, HUMULIN R) 100 Units in 0.9% sodium chloride 100 mL infusion  1-50 Units/hr IntraVENous TITRATE  ELECTROLYTE REPLACEMENT NOTE: Nurse to review Serum Potassium and Magnesuim levels and Initiate Electrolyte Replacement Protocol as needed  1 Each Other PRN No Known Allergies Objective: 
Vitals:   
Vitals:  
 11/25/19 0600 11/25/19 0700 11/25/19 0709 11/25/19 0800 BP:      
Pulse: 99 98  (!) 108 Resp: 24 18  16 Temp:      
SpO2: 98% 98% Weight:   89.5 kg (197 lb 6.4 oz) Height:      
 
Intake and Output: 
No intake/output data recorded. 11/23 1901 - 11/25 0700 In: 1627.2 [P.O.:500; I.V.:1127.2] Out: 8137 [Urine:5850; Drains:845] Physical Examination: 
 
General: Awake and alert Neck:  Lines + Resp:  Decreased bibasilar breath sounds CV:  VADsounds  3+ b/l LE edema GI:  Soft, NT, + Bowel sounds Neurologic:  AAO X3  
:  No neal [x]    High complexity decision making was performed 
[x]    Patient is at high-risk of decompensation with multiple organ involvement Lab Data Personally Reviewed: I have reviewed all the pertinent labs, microbiology data and radiology studies during assessment. Recent Labs  
  11/25/19 
0416 11/24/19 
1626 11/24/19 
0432 11/23/19 
0409 *  --  131* 132* K 3.2* 3.5 3.3* 3.2*  
CL 93*  --  94* 96* CO2 32  --  31 30 *  --  96 97 BUN 20  --  22* 22* CREA 1.04  --  1.08 1.05  
CA 7.9*  --  8.2* 8.4* MG 1.8  --  1.8 1.9 PHOS 2.8  --   --   --   
ALB 2.0*  --  2.2* 2.4* SGOT 14*  --  16 18 ALT <6*  --  <6* 7* INR 1.9*  --  1.8* 1.8* Recent Labs  
  11/25/19 
0416 11/24/19 
0432 11/23/19 
0409 WBC 6.0 6.2 6.9 HGB 9.5* 9.5* 9.3* HCT 30.8* 30.0* 29.5*  
 134* 130* No results found for: SDES Lab Results Component Value Date/Time Culture result: NO GROWTH 5 DAYS 11/12/2019 11:18 AM  
 Culture result: SERRATIA MARCESCENS (A) 10/31/2019 10:05 AM  
 Culture result: SERRATIA MARCESCENS (2ND COLONY TYPE/STRAIN) (A) 10/31/2019 10:05 AM  
 Culture result: ENTEROCOCCUS FAECALIS GROUP D (A) 10/31/2019 10:05 AM  
 Culture result: NO GROWTH 5 DAYS 10/25/2019 07:14 PM  
 
Recent Results (from the past 24 hour(s)) GLUCOSE, POC Collection Time: 11/24/19 11:28 AM  
Result Value Ref Range Glucose (POC) 170 (H) 65 - 100 mg/dL Performed by Sherra Sep GLUCOSE, POC Collection Time: 11/24/19  4:24 PM  
Result Value Ref Range Glucose (POC) 139 (H) 65 - 100 mg/dL Performed by Sherra Sep PTT Collection Time: 11/24/19  4:26 PM  
Result Value Ref Range aPTT 62.1 (H) 22.1 - 32.0 sec  
 aPTT, therapeutic range     58.0 - 77.0 SECS  
POTASSIUM Collection Time: 11/24/19  4:26 PM  
Result Value Ref Range Potassium 3.5 3.5 - 5.1 mmol/L  
GLUCOSE, POC Collection Time: 11/24/19  9:34 PM  
Result Value Ref Range Glucose (POC) 120 (H) 65 - 100 mg/dL Performed by Cristhian De La Garza METABOLIC PANEL, COMPREHENSIVE Collection Time: 11/25/19  4:16 AM  
Result Value Ref Range Sodium 131 (L) 136 - 145 mmol/L Potassium 3.2 (L) 3.5 - 5.1 mmol/L Chloride 93 (L) 97 - 108 mmol/L  
 CO2 32 21 - 32 mmol/L Anion gap 6 5 - 15 mmol/L Glucose 190 (H) 65 - 100 mg/dL BUN 20 6 - 20 MG/DL Creatinine 1.04 0.70 - 1.30 MG/DL  
 BUN/Creatinine ratio 19 12 - 20 GFR est AA >60 >60 ml/min/1.73m2 GFR est non-AA >60 >60 ml/min/1.73m2 Calcium 7.9 (L) 8.5 - 10.1 MG/DL Bilirubin, total 1.4 (H) 0.2 - 1.0 MG/DL  
 ALT (SGPT) <6 (L) 12 - 78 U/L  
 AST (SGOT) 14 (L) 15 - 37 U/L Alk. phosphatase 105 45 - 117 U/L Protein, total 5.4 (L) 6.4 - 8.2 g/dL Albumin 2.0 (L) 3.5 - 5.0 g/dL Globulin 3.4 2.0 - 4.0 g/dL A-G Ratio 0.6 (L) 1.1 - 2.2 MAGNESIUM  
 Collection Time: 11/25/19  4:16 AM  
Result Value Ref Range Magnesium 1.8 1.6 - 2.4 mg/dL LACTIC ACID Collection Time: 11/25/19  4:16 AM  
Result Value Ref Range Lactic acid 1.4 0.4 - 2.0 MMOL/L  
CBC W/O DIFF Collection Time: 11/25/19  4:16 AM  
Result Value Ref Range WBC 6.0 4.1 - 11.1 K/uL  
 RBC 3.15 (L) 4.10 - 5.70 M/uL HGB 9.5 (L) 12.1 - 17.0 g/dL HCT 30.8 (L) 36.6 - 50.3 % MCV 97.8 80.0 - 99.0 FL  
 MCH 30.2 26.0 - 34.0 PG  
 MCHC 30.8 30.0 - 36.5 g/dL  
 RDW 19.3 (H) 11.5 - 14.5 % PLATELET 487 596 - 446 K/uL MPV 9.5 8.9 - 12.9 FL  
 NRBC 0.0 0  WBC ABSOLUTE NRBC 0.00 0.00 - 0.01 K/uL PROTHROMBIN TIME + INR Collection Time: 11/25/19  4:16 AM  
Result Value Ref Range INR 1.9 (H) 0.9 - 1.1 Prothrombin time 18.9 (H) 9.0 - 11.1 sec PTT Collection Time: 11/25/19  4:16 AM  
Result Value Ref Range aPTT 60.9 (H) 22.1 - 32.0 sec  
 aPTT, therapeutic range     58.0 - 77.0 SECS  
NT-PRO BNP Collection Time: 11/25/19  4:16 AM  
Result Value Ref Range NT pro-BNP 6,963 (H) <125 PG/ML  
PHOSPHORUS Collection Time: 11/25/19  4:16 AM  
Result Value Ref Range Phosphorus 2.8 2.6 - 4.7 MG/DL  
GLUCOSE, POC Collection Time: 11/25/19  7:18 AM  
Result Value Ref Range Glucose (POC) 188 (H) 65 - 100 mg/dL Performed by [address] [place] [address],8W Collection Time: 11/25/19  7:19 AM  
Result Value Ref Range Crossmatch Expiration 11/28/2019 ABO/Rh(D) O POSITIVE Antibody screen NEG Comment Previously identified nonspecific and nonspecific cold antibodies Total time spent with patient:  xxx   min. Care Plan discussed with: 
Patient Family RN Consulting Physician [address],      
 
I have reviewed the flowsheets. Chart and Pertinent Notes have been reviewed. No change in PMH ,family and social history from Consult note.  
 
 
Ovidio Higuera MD

## 2019-11-25 NOTE — PERIOP NOTES
TRANSFER - IN REPORT: 
 
Verbal report received from [address] [place] [address], RN(name) on Jimmy Javier  being received from CVICU(unit) for routine progression of care Report consisted of patients Situation, Background, Assessment and  
Recommendations(SBAR). Information from the following report(s) SBAR, Kardex, [facility] and Cardiac Rhythm paced was reviewed with the receiving nurse. Opportunity for questions and clarification was provided.

## 2019-11-25 NOTE — PROGRESS NOTES
Vascular: 
 
Left arm stable. Resp status fragile. Plan left brachial embolectomy later today if OK with cardiac surgery. OK to leave bivalirudin going.

## 2019-11-25 NOTE — PROGRESS NOTES
[facility] Progress Note DOS:   11/25/2019 NAME:  Fahad Gibbs MRN:   [id number] REFERRING PROVIDER:  Dr. Tia Montana PRIMARY CARE PHYSICIAN: Jas Millard MD 
 
 
Chief Complaint:  
Cardiogenic Shock HPI: Luis Kiser is a 71y.o. year old pleasant white male with a history of HTN, HLD, JOSE, CAD s/p cardiac arrest VFib s/p CABG (2011) c/b sternal would infection and sternectomy, ischemic cardiomyopathy LVEF 15-20%, s/p ICD and with LBBB. He was admitted with acute on chronic systolic heart failure with massive volume overload > 20 lbs, in the setting of atrial fibrillation s/p failed DCCV and underwent DCCV and RHC on 6/18.  S/p BiVICD on 6/21/19 with Dr. Dre Porter. He was discharged home home on IV milrinone on 6/26/19. [facility] has been followed closely by Dr. Tia Montana and the [facility]. 
  
Mr. Kiser was admitted for acute on chronic systolic heart failure. He underwent implantation of Impella 5.0 due to CS and has completed his LVAD evaluation. He meets criteria for implant of HM3 as DT. He was NYHA Class IV prior to implant of Impella 5.0, has LVEF < 15%, was intolerant of GDMT due to symptomatic hypotension and renal dysfunction. He remains dependent on temporary MCS support. RHC  revealed compensated hemodynamics on Impella. His renal function has improved dramatically with improvement in his hemodynamics. He is not a suitable transplant candidate due to single kidney, sternectomy, debility, and frailty. He was reviewed by [facility] and felt to be a high risk heart transplant candidate due to multiple co-morbidities as well as the afore mentioned conditions. He remained in the CCU and underwent a HM 3 implant as DT on 11/18. 24Hr Events Remains volume overloaded Cr stable Tachycardic INR sub-therapeutic Working with PT/OT Procedure:  Procedure(s): REMOVAL TEMPORARY LEFT VENTRICULAR ASSIST DEVICE, IMPLANTATION OF LEFT VENTRICULAR ASSIST DEVICE PERMANENT (VAD), ECC, JACQUE, EPI AORTIC US BY DR Bobbi Ni. POD:  7 Impression / Plan:  
Heart Failure Status: NYHA Class IV INTERMACS Category 2 Chronic systolic heart failure due to ICM, NYHA Class IV, EF < 15%, cardiogenic shock bridged with Impella 5.0 support to HM 3 implant S/p Impella removal and LVAD implant Goal CVP < 15 mmHg - transduce via CVP Continue milrinone 0.375mcg/kg/min for now Cont Sildenafil - will need PA Decrease IV Bumex infusion to 0.5 mg/hr No AA/ARB/ARNI post op- will start as appropriate Strict I/O, daily weights, Na+ restricted diet Continue LVAD education Daily dressing changes Anticoagulation for LVAD, INR goal 2-3 Continue warfarin, ASA, bivalirudin INR 1.9 Cont coumadin tonight 5 mg Cont Bivalrudin until INR ~ 3.5-4, then trial INR off bival x 4 hrs  
  
Acute Respiratory Failure post op Resolved Pulmonary hygiene Wean NC for sats > 92% Cont home CPAP - wife bringing back up mask Post op Pain PRN oxycodone Comfort measures L Brachial Artery Thrombosis s/p thrombectomy Surgical management per Dr. Deepti Singh On bivalrudin and warfarin Pain improved CKD 3, atrophic left kidney Appreciate Nephrology assistance Assess diuretic dosing daily IV bumex infusion Avoid nephrotoxins Trend labs  
  
CAD s/p CABG Cont low dose ASA- watch platelets No BB d/t RV failure Hold statin due to recent hepatic failure [facility] performed 11/13 - low likelihood of benefit from revascularization  
  
Hx of VF arrest s/p BiV-ICD No recent shocks Keep K > 4 and Mg > 2  
   
PAF Currently in ST, Paced Cont PO Amio Check EKG today to assess rhythm and QTc Hypokalemia Increase Klor Con to 60 mEq TID PO Hx of gout Uric acid today Suspected aspiration pneumonia New, RUL consolidation Suspect aspiration related to over sedation Limit sedatives Sputum culture Begin cefepime 2g q8h x 7 days  
  
 Urinary retention, c/b serratia UTI and hematuria Appreciate Urology consult Cystoscopy performed 11/13 shows catheter induced cystitis Repeat UA shows resolution of UTI  
  
Malnutrition Appreciate Nutritionist consult Prealbumin improved to 19 Repeat Cont Marinol  And remeron 
  
COPD Appreciate Pulmonology assistance Continue nebs PRN   
PFTs complete 
  
Anemia Multifactorial, due to hemolysis + epistaxis + hematuria Intolerant of octreotide or doxycycline for AVM ppx due to prolonged QTc Transfuse for Hgb goal > 8 
  
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  
  
Hx of sternal wound infection, sternectomy Sternum closed post op- monitor  
  
Vocal cord paralysis Improved Speech therapy following May need MBS Debility Continue PT/OT  
  
Ppx Protonix PT/OT Will continue cefepime for 2 weeks post op per Dr. Catrina Lee for Impella prophylaxis Bowel regimen Cont Mechanical soft PICC placed Dispo: 
Remain in CVI for now, hopefully transfer tomorrow to [facility] History: 
Past Medical History:  
Diagnosis Date  Degenerative disc disease, lumbar  Heart failure ([facility] [address]  High cholesterol  Hypertension  Paroxysmal atrial fibrillation ([facility] [address] 4/2/2019  Spinal stenosis Past Surgical History:  
Procedure Laterality Date  COLONOSCOPY Left 11/11/2019 COLONOSCOPY at bedside performed by Radha Minor MD at [address]  HX CORONARY ARTERY BYPASS GRAFT    
 triple  HX HERNIA REPAIR    
 HX IMPLANTABLE CARDIOVERTER DEFIBRILLATOR  CA CARDIOVERSION ELECTIVE ARRHYTHMIA EXTERNAL N/A 6/10/2019 EP CARDIOVERSION performed by Za Iraheta MD at [address] CATH LAB  CA CARDIOVERSION ELECTIVE ARRHYTHMIA EXTERNAL N/A 6/18/2019 EP CARDIOVERSION performed by Dipesh Villalba MD at [address] CATH LAB  CA INSJ/RPLCMT PERM DFB W/TRNSVNS LDS 1/DUAL CHMBR N/A 6/21/2019 INSERT ICD BIV MULTI performed by Alexi Mathew MD at [address]  CATH LAB  [place] TCAT IMPL WRLS P-ART PRS SNR L-T HEMODYN MNTR N/A 2019 IMPLANT HEART FAILURE MONITORING DEVICE performed by Loi Mancia MD at [address]  CATH LAB Social History Socioeconomic History  Marital status:  Spouse name: Not on file  Number of children: Not on file  Years of education: Not on file  Highest education level: Not on file Occupational History  Not on file Social Needs  Financial resource strain: Not on file  Food insecurity:  
  Worry: Not on file Inability: Not on file  Transportation needs:  
  Medical: Not on file Non-medical: Not on file Tobacco Use  Smoking status: Former Smoker Last attempt to quit: 2010 Years since quittin.9  Smokeless tobacco: Never Used Substance and Sexual Activity  Alcohol use: Not Currently Comment: rarely  Drug use: Never  Sexual activity: Not on file Lifestyle  Physical activity:  
  Days per week: Not on file Minutes per session: Not on file  Stress: Not on file Relationships  Social connections:  
  Talks on phone: Not on file Gets together: Not on file Attends Buddhist service: Not on file Active member of club or organization: Not on file Attends meetings of clubs or organizations: Not on file Relationship status: Not on file  Intimate partner violence:  
  Fear of current or ex partner: Not on file Emotionally abused: Not on file Physically abused: Not on file Forced sexual activity: Not on file Other Topics Concern [address] Service Not Asked  Blood Transfusions Not Asked  Caffeine Concern Not Asked  Occupational Exposure Not Asked Kye Peeling Hazards Not Asked  Sleep Concern Not Asked  Stress Concern Not Asked  Weight Concern Not Asked  Special Diet Not Asked  Back Care Not Asked  Exercise Not Asked  Bike Helmet Not Asked  Seat Belt Not Asked  Self-Exams Not Asked Social History Narrative  Not on file Family History Problem Relation Age of Onset  Lung Disease Mother  Hypertension Mother [facility] Arthritis-osteo Mother  Heart Disease Mother  Heart Disease Father  Diabetes Father Current Medications:  
Current Facility-Administered Medications Medication Dose Route Frequency Provider Last Rate Last Dose  potassium chloride SR (KLOR-CON 10) tablet 60 mEq  60 mEq Oral TID Lenon Elisha, NP   60 mEq at 11/25/19 1732  digoxin (LANOXIN) tablet 0.125 mg  0.125 mg Oral DAILY Nicky Herrera, NP   0.125 mg at 11/25/19 1223  cefepime (MAXIPIME) 2 g in 0.9% sodium chloride (MBP/ADV) 100 mL  2 g IntraVENous Q8H Nicky Herrera NP      
 albumin human 25% (BUMINATE) solution 12.5 g  12.5 g IntraVENous ONCE Nicky Herrera, NP      
 warfarin (COUMADIN) tablet 5 mg  5 mg Oral ONCE Nicky Herrera, NP      
 magnesium oxide (MAG-OX) tablet 400 mg  400 mg Oral BID [employer], NP   400 mg at 11/25/19 1732  oxyCODONE IR (ROXICODONE) tablet 5 mg  5 mg Oral Q6H PRN Gates Eliza, NP      
 bumetanide (BUMEX) 0.25 mg/mL infusion  0.5 mg/hr IntraVENous CONTINUOUS Nicky Herrera NP 2 mL/hr at 11/25/19 1541 0.5 mg/hr at 11/25/19 1541  mirtazapine (REMERON) tablet 15 mg  15 mg Oral QHS [employer], NP   15 mg at 11/24/19 2121  
 balsam peru-castor oil (VENELEX) ointment   Topical TID Kim Fortune MD      
 tamsulosin [facility]) capsule 0.4 mg  0.4 mg Oral DAILY Logan Kincaid MD   0.4 mg at 11/25/19 3895  aspirin chewable tablet 81 mg  81 mg Oral DAILY Shana Sims, NP   81 mg at 11/25/19 1997  sildenafil (antihypertensive) (REVATIO) tablet 20 mg  20 mg Oral TID Humphrey BHAKTA NP   20 mg at 11/25/19 1732  pantoprazole (PROTONIX) tablet 40 mg  40 mg Oral ACShana Justin, NP   40 mg at 11/25/19 0710  
 insulin lispro (HUMALOG) injection   SubCUTAneous TIDAC Shana Sims NP   Stopped at 11/21/19 1630  
 insulin lispro (HUMALOG) injection   SubCUTAneous AC&HS Humphrey BHAKTA NP   2 Units at 11/25/19 1754  Warfarin MD/NP dosing   Other PRN Lul Altman MD      
 epoetin yvonne-epbx (RETACRIT) injection 20,000 Units  20,000 Units SubCUTAneous Q7D Logan Kincaid MD   20,000 Units at 11/19/19 0924  
 0.9% sodium chloride infusion 250 mL  250 mL IntraVENous PRN Kim Fortune MD      
 dronabinol (MARINOL) capsule 2.5 mg  2.5 mg Oral ACB&D Humphrey BHAKTA NP   2.5 mg at 11/25/19 1732  bivalirudin (ANGIOMAX) 250 mg in 0.9% sodium chloride (MBP/ADV) 50 mL  0.02-2.5 mg/kg/hr IntraVENous TITRATE Humphrey BHAKTA NP 4 mL/hr at 11/25/19 1541 0.2198 mg/kg/hr at 11/25/19 1541  lidocaine (LIDODERM) 5 % patch 1 Patch  1 Patch TransDERmal Q24H Shana Sims NP   1 Patch at 11/25/19 1734  
 0.9% sodium chloride infusion  9 mL/hr IntraVENous CONTINUOUS Nicky Herrera NP   Stopped at 11/22/19 1400  
 0.45% sodium chloride infusion  10 mL/hr IntraVENous CONTINUOUS Nicky Herrera NP   Stopped at 11/22/19 1736  sodium chloride (NS) flush 5-40 mL  5-40 mL IntraVENous Q8H Haley Herrera NP   10 mL at 11/25/19 1414  sodium chloride (NS) flush 5-40 mL  5-40 mL IntraVENous PRN Nicky Herrera NP      
 acetaminophen (TYLENOL) tablet 650 mg  650 mg Oral Q6H PRN Nicky Herrera NP   650 mg at 11/24/19 2122  
 naloxone [facility]) injection 0.4 mg  0.4 mg IntraVENous PRN Nicky Herrera NP      
 ondansetron [facility]) injection 4 mg  4 mg IntraVENous Q4H PRN Nicky Herrera NP   4 mg at 11/20/19 2000  
 albuterol-ipratropium (DUO-NEB) 2.5 MG-0.5 MG/3 ML  3 mL Nebulization Q6H PRN Nicky Herrera NP      
 senna-docusate (PERICOLACE) 8.6-50 mg per tablet 1 Tab  1 Tab Oral DAILY Nicky Herrera NP   1 Tab at 11/25/19 8041  polyethylene glycol (MIRALAX) packet 17 g  17 g Oral DAILY Vicente Herrera NP   17 g at 11/25/19 0629  bisacodyl (DULCOLAX) tablet 5 mg  5 mg Oral DAILY PRN Vicente Herrera NP      
 sucralfate (CARAFATE) tablet 1 g  1 g Oral AC&HS Willow Rodrigues NP   1 g at 11/25/19 1732  
 0.9% sodium chloride infusion 250 mL  250 mL IntraVENous PRN Iline Severs, MD      
 influenza vaccine 2019-20 (6 mos+)(PF) (FLUARIX/FLULAVAL/FLUZONE QUAD) injection 0.5 mL  0.5 mL IntraMUSCular PRIOR TO DISCHARGE Rigoberto Altman MD      
 hydrALAZINE (APRESOLINE) 20 mg/mL injection 20 mg  20 mg IntraVENous Q6H PRN Shana Sims NP   20 mg at 11/19/19 0547  
 dextrose 10 % infusion 125-250 mL  125-250 mL IntraVENous PRN Lamin Del Valle MD   Stopped at 11/18/19 0700  
 milrinone (PRIMACOR) 20 MG/100 ML D5W infusion  0.375 mcg/kg/min IntraVENous TITRATE Conchita Siva QUEZADA MD 10 mL/hr at 11/25/19 1541 0.375 mcg/kg/min at 11/25/19 1541  
 insulin regular (NOVOLIN R, HUMULIN R) 100 Units in 0.9% sodium chloride 100 mL infusion  1-50 Units/hr IntraVENous TITRATE Vicente Herrera NP   Stopped at 11/21/19 1392  ELECTROLYTE REPLACEMENT NOTE: Nurse to review Serum Potassium and Magnesuim levels and Initiate Electrolyte Replacement Protocol as needed  1 Each Other PRN Willow Rodrigues NP Allergies: No Known Allergies ROS:   
Review of Systems Constitutional: Negative for chills, fever and malaise/fatigue. HENT: Positive for hearing loss. Respiratory: Negative for cough, sputum production and shortness of breath. Cardiovascular: Positive for leg swelling. Negative for chest pain. Gastrointestinal: Negative for heartburn, nausea and vomiting. Musculoskeletal: Negative for myalgias. Skin: Negative. Neurological: Positive for weakness. Negative for dizziness and headaches. Endo/Heme/Allergies: Negative. Psychiatric/Behavioral: Positive for depression. Physical Exam: Physical Exam 
Vitals signs and nursing note reviewed. Constitutional:   
   Appearance: He is well-developed and well-nourished. HENT:  
   Head: Normocephalic. Eyes:  
   Pupils: Pupils are equal, round, and reactive to light. Neck: Musculoskeletal: Normal range of motion and neck supple. Vascular: No JVD. Cardiovascular:  
   Rate and Rhythm: Normal rate and regular rhythm. Heart sounds: Normal heart sounds. Comments: + VAD hum Pulmonary:  
   Effort: Pulmonary effort is normal.  
   Breath sounds: Normal breath sounds. Abdominal:  
   General: Bowel sounds are normal. There is no distension. Palpations: Abdomen is soft. Musculoskeletal: Normal range of motion. General: Edema present. Comments: bilaterally Skin: 
   General: Skin is warm and dry. Neurological:  
   Mental Status: He is alert and oriented to person, place, and time. Vitals:  
Visit Vitals BP (!) 74/56 Pulse 95 Temp 97.9 °F (36.6 °C) Resp 23 Ht 6' 2\" (1.88 m) Wt 197 lb 6.4 oz (89.5 kg) SpO2 96% BMI 25.34 kg/m² Temp (24hrs), Av.9 °F (36.6 °C), Min:97.6 °F (36.4 °C), Max:98.4 °F (36.9 °C) Hemodynamics: 
 CVP: CVP (mmHg): 15 mmHg (19 1400) Admission Weight: Last Weight Weight: 192 lb 10.9 oz (87.4 kg) Weight: 197 lb 6.4 oz (89.5 kg) Intake / Output / Drain: 
Last 24 hrs.:  
 
Intake/Output Summary (Last 24 hours) at 2019 1808 Last data filed at 2019 1800 Gross per 24 hour Intake 653.27 ml Output 5570 ml Net -4916.73 ml Drains:  
24h: 380 8h: 180 Extubation Date / Time:  19 at 1030am 
 
Oxygen Therapy: 
Oxygen Therapy O2 Sat (%): 96 % (19 1800) Pulse via Oximetry: 104 beats per minute (19 1400) O2 Device: Nasal cannula (19 1700) O2 Flow Rate (L/min): 4 l/min (19 1700) FIO2 (%): 50 % (19 1035) VAD Data: LVAD (Heartmate) Pump Speed (RPM): 5200 Pump Flow (LPM): 4.9 PI (Pulsitility Index): 2.2 Power: 3.6 MAP: 82  Test: Yes 
Back Up  at Bedside & Labeled: Yes Power Module Test: Yes Driveline Site Care: No 
Driveline Dressing: Clean, Dry, and Intact Drive Line Exam: 
Stabilization device intact: yes Line inspected for damage: yes Appearance: no edema, erythema, drainage Stabilization Method: anchor to abd Recent Labs:  
Labs Latest Ref Rng & Units 11/25/2019 11/24/2019 11/24/2019 11/23/2019 11/22/2019 11/21/2019 11/20/2019 WBC 4.1 - 11.1 K/uL 6.0 - 6.2 6.9 6.5 7.0 6.6 RBC 4.10 - 5.70 M/uL 3.15(L) - 3.12(L) 3.03(L) 2.84(L) 2.80(L) 2.62(L) Hemoglobin 12.1 - 17.0 g/dL 9.5(L) - 9. 5(L) 9.3(L) 8.7(L) 8.6(L) 8.0(L) Hematocrit 36.6 - 50.3 % 30. 8(L) - 30. 0(L) 29. 5(L) 28. 0(L) 27. 9(L) 25. 9(L) MCV 80.0 - 99.0 FL 97.8 - 96.2 97.4 98.6 99. 6(H) 98.9 Platelets 720 - 714 K/uL 170 - 134(L) 130(L) 105(L) 105(L) 92(L) Lymphocytes 12 - 49 % - - - - - - - Monocytes 5 - 13 % - - - - - - - Eosinophils 0 - 7 % - - - - - - - Basophils 0 - 1 % - - - - - - - Albumin 3.5 - 5.0 g/dL 2. 0(L) - 2. 2(L) 2. 4(L) 2. 5(L) 2. 6(L) 2. 7(L) Calcium 8.5 - 10.1 MG/DL 7. 9(L) - 8. 2(L) 8.4(L) 8.4(L) 8.6 8.6 SGOT 15 - 37 U/L 14(L) - 16 18 23 40(H) 60(H) Glucose 65 - 100 mg/dL 190(H) - 96 97 95 95 110(H) BUN 6 - 20 MG/DL 20 - 22(H) 22(H) 24(H) 23(H) 22(H) Creatinine 0.70 - 1.30 MG/DL 1.04 - 1.08 1.05 1.20 1.36(H) 1.43(H) Sodium 136 - 145 mmol/L 131(L) - 131(L) 132(L) 132(L) 133(L) 138 Potassium 3.5 - 5.1 mmol/L 3.2(L) 3.5 3. 3(L) 3. 2(L) 3.5 4.0 3.9 TSH 0.36 - 3.74 uIU/mL - - - - - - -  
PSA 0.01 - 4.0 ng/mL - - - - - - -  
LDH 85 - 241 U/L - - 310(H) 341(H) 389(H) 460(H) 496(H) CEA ng/mL - - - - - - - Some recent data might be hidden EKG:  
EKG Results Procedure 720 Value Units Date/Time EKG, 12 LEAD, INITIAL [[id number]] Order Status:  Sent EKG, 12 LEAD, INITIAL [[id number]] Collected:  11/19/19 0417 Order Status:  Completed Updated:  11/19/19 1224 Ventricular Rate 76 BPM   
  Atrial Rate 0 BPM   
  QRS Duration 156 ms   
  Q-T Interval 564 ms QTC Calculation (Bezet) 634 ms Calculated R Axis 81 degrees Calculated T Axis 120 degrees Diagnosis -- Electronic ventricular pacemaker Baseline artifact When compared with ECG of 13-NOV-2019 04:40, 
Vent. rate has increased BY   2 BPM 
Confirmed by Sebastian Sun M.D., Jane Barroso ([id number]) on 11/19/2019 12:23:52 PM 
  
 EKG, 12 LEAD, INITIAL [[id number]] Order Status:  Canceled EKG, 12 LEAD, INITIAL [[id number]] Order Status:  Canceled EKG, 12 LEAD, INITIAL [[id number]] Order Status:  Canceled EKG, 12 LEAD, INITIAL [[id number]] Order Status:  Canceled EKG, 12 LEAD, INITIAL [[id number]] Order Status:  Canceled EKG, 12 LEAD, INITIAL [[id number]] Collected:  11/13/19 0440 Order Status:  Completed Updated:  11/13/19 2200 Ventricular Rate 74 BPM   
  Atrial Rate 66 BPM   
  QRS Duration 166 ms   
  Q-T Interval 488 ms QTC Calculation (Bezet) 541 ms Calculated R Axis -15 degrees Calculated T Axis 157 degrees Diagnosis -- Electronic ventricular pacemaker When compared with ECG of 11-NOV-2019 04:49, No significant change was found Confirmed by He Wei M.D., Mk Allison ([id number]) on 11/13/2019 10:00:38 PM 
  
 EKG, 12 LEAD, INITIAL [[id number]] Order Status:  Canceled EKG, 12 LEAD, INITIAL [[id number]] Collected:  11/11/19 0449 Order Status:  Completed Updated:  11/11/19 2405 Ventricular Rate 74 BPM   
  Atrial Rate 39 BPM   
  QRS Duration 158 ms Q-T Interval 504 ms QTC Calculation (Bezet) 559 ms Calculated R Axis 13 degrees Calculated T Axis 175 degrees Diagnosis -- Electronic ventricular pacemaker When compared with ECG of 05-NOV-2019 10:44, 
Vent.  rate has decreased BY   9 BPM 
 Confirmed by Roderick Schroeder M.D., Gina Miramontes ([id number]) on 11/11/2019 8:54:27 AM 
  
 EKG, 12 LEAD, INITIAL [[id number]] Order Status:  Canceled EKG, 12 LEAD, INITIAL [[id number]] Order Status:  Canceled EKG, 12 LEAD, INITIAL [[id number]] Order Status:  Canceled EKG, 12 LEAD, INITIAL [[id number]] Collected:  11/05/19 1044 Order Status:  Completed Updated:  11/05/19 1226 Ventricular Rate 83 BPM   
  Atrial Rate 83 BPM   
  P-R Interval 80 ms QRS Duration 162 ms Q-T Interval 502 ms QTC Calculation (Bezet) 589 ms Calculated R Axis 2 degrees Calculated T Axis -156 degrees Diagnosis -- Electronic ventricular pacemaker Marked T wave abnormality, consider  
anterolateral ischemia When compared with ECG of 02-NOV-2019 10:42, No significant change was found Confirmed by Buffy Marquez M.D., Corinna Conner ([id number]) on 11/5/2019 12:25:52 PM 
  
 EKG, 12 LEAD, INITIAL [[id number]] Collected:  11/02/19 1042 Order Status:  Completed Updated:  11/03/19 1538 Ventricular Rate 91 BPM   
  Atrial Rate 91 BPM   
  P-R Interval 108 ms QRS Duration 148 ms Q-T Interval 524 ms QTC Calculation (Bezet) 644 ms Calculated R Axis 5 degrees Calculated T Axis -152 degrees Diagnosis --  
  Sinus rhythm with short [place] with occasional premature ventricular complexes  
and fusion complexes Nonspecific intraventricular block Marked T wave abnormality, consider anterolateral ischemia When compared with ECG of 26-OCT-2019 06:55, Sinus rhythm has replaced Electronic ventricular pacemaker Prolonged QT Confirmed by Alex Martin ([id number]) on 11/3/2019 6:35:23 AM 
  
 EKG, 12 LEAD, INITIAL [[id number]] Order Status:  Canceled EKG, 12 LEAD, INITIAL [[id number]] Collected:  10/26/19 1552 Order Status:  Completed Updated:  10/26/19 1807 Ventricular Rate 80 BPM   
  Atrial Rate 76 BPM   
  QRS Duration 174 ms Q-T Interval 462 ms QTC Calculation (Bezet) 532 ms Calculated R Axis 28 degrees Calculated T Axis 150 degrees Diagnosis -- Atrial flutter Left bundle branch block When compared with ECG of 25-OCT-2019 18:20, 
Electronic demand pacing is not noted Confirmed by Nick Sun ([id number]) on 10/26/2019 6:07:36 PM 
  
 EKG, 12 LEAD, INITIAL [[id number]] Collected:  10/25/19 1820 Order Status:  Completed Updated:  10/26/19 1749 Ventricular Rate 72 BPM   
  Atrial Rate 72 BPM   
  P-R Interval 86 ms QRS Duration 116 ms   
  Q-T Interval 506 ms QTC Calculation (Bezet) 554 ms Calculated P Axis 9 degrees Calculated R Axis -68 degrees Calculated T Axis 10 degrees Diagnosis --  
  undetermied rhythm possible atrial flutter Nonspecific intraventricular conduction delay Ventricular paced rhythm When compared with ECG of 26-JUN-2019 11:39, 
Significant changes have occurred Confirmed by Nick Sun ([id number]) on 10/26/2019 5:49:02 PM 
  
  
No specialty comments available. 10/25/19 OR ECHO JACQUE INTRAOP  11/18/2019 Narrative See Anesthesia Procedure note for procedure details. Signed by:   
  
Echo Results  (Last 48 hours) None CXR Results  (Last 48 hours)  
          
 11/25/19 0430  XR CHEST PORT Final result Impression:  IMPRESSION: Persistent prominence of the pulmonary interstitial markings with  
continued evidence of cardiomegaly. Interval development of hazy density  
involving the right upper lobe suggestive of developing infiltration. Narrative:  Portable chest single view dated 11/25/2019 Comparison chest dated 11/24/2019 History is post LVAD implants A single frontal view of the chest was obtained. The cardiac silhouette  
continues to be enlarged. There is abnormal prominence of the pulmonary  
interstitial markings. There has been development of some hazy density in the  
region of the right upper lobe. There is veiling opacity at both lung bases. This is suggestive of the presence of bilateral pleural effusions. Parenchymal  
disease at the lung bases cannot be excluded. The PICC line on the right is  
unchanged in position. 11/24/19 0446  XR CHEST PORT Final result Impression:  IMPRESSION: No significant change from prior with tubes and lines as above. Narrative:  EXAM: XR CHEST PORT INDICATION: post LVAD implant COMPARISON: Chest x-ray 11/23/2019. FINDINGS: A portable AP radiograph of the chest was obtained at 04:07 hours. The  
patient is on a cardiac monitor. Pacemaker and LVAD device remain in stable and  
expected positions. Left and right-sided chest tubes remain in stable and  
expected positions. Right PICC line traverses expected course terminating in the  
mid superior vena cava, unchanged. Small bilateral pleural effusions and basilar  
atelectasis redemonstrated. Lungs are otherwise clear. No pneumothorax. There  
are median sternotomy wires and surgical clips compatible with prior CABG. The  
cardiac silhouette remains enlarged and the mediastinal contours and pulmonary  
vascularity are normal.  The bones and soft tissues are grossly within normal  
limits. TIERRA Fowler [address], [address] Office [phone] Fax [phone] 
24 hour VAD/HF Pager: [phone] [facility] ATTENDING ADDENDUM Patient was seen and examined in person. Data and notes were reviewed. I have discussed and agree with the plan as noted in the NP note above without further additions. Melody Walters MD PhD 
Calos Rueda [address] [place] [address]

## 2019-11-25 NOTE — PROGRESS NOTES
JESÚS Plan: 
    Patient disposition is pending medical progress; remains in CVICU; s/p LVAD 11/21 Plans for vascular surgery today - left brachial embolectomy CM updated re plans for vascular surgery today on left arm. Patient is s/p 7 days post op LVAD implant. [facility] trained [address] on LVAD. CM will continue to follow for transition of care needs.  
 
Dianne Liu, MPH

## 2019-11-25 NOTE — ROUTINE PROCESS
Patient: Natalie Lofton MRN: [id number]  SSN: [id number] YOB: 1950  Age: 71 y.o. Sex: male Patient is status post Procedure(s): LEFT BRACHIAL THROMBECTOMY. Surgeon(s) and Role: 
   Soumya Merino MD - Primary Local/Dose/Irrigation:  SEE MAR 
 
       
PICC Triple Lumen 94/59/88 Right;Basilic (Active) Criteria for Appropriate Use Limited/no vessel suitable for conventional peripheral access 11/24/2019  8:00 AM  
Phlebitis Assessment 0 11/24/2019  8:00 PM  
Infiltration Assessment 0 11/24/2019  8:00 PM  
Arm Circumference (cm) 27 cm 11/22/2019  3:05 PM  
Date of Last Dressing Change 11/22/19 11/24/2019  8:00 PM  
Dressing Status Clean, dry, & intact 11/24/2019  8:00 PM  
External Catheter Length (cm) 1 centimeters 11/22/2019  3:05 PM  
Dressing Type Disk with Chlorhexadine gluconate (CHG); Stabilization/securement device;Transparent 11/24/2019  8:00 PM  
Action Taken Open ports on tubing capped 11/24/2019  8:00 PM  
Hub Color/Line Status Gray;Patent; Infusing 11/24/2019  8:00 PM  
Hub Color/Line Status Purple;Patent; Infusing 11/24/2019  8:00 PM  
Hub Color/Line Status Red;Patent; Infusing 11/24/2019  8:00 PM  
Alcohol Cap Used Yes 11/24/2019  8:00 PM  
      
Peripheral IV 11/16/19 Anterior;Distal;Right Forearm (Active) Phlebitis Assessment 0 11/24/2019  8:00 PM  
Infiltration Assessment 0 11/24/2019  8:00 PM  
Dressing Status Clean, dry, & intact 11/24/2019  8:00 PM  
Dressing Type Transparent;Tape 11/24/2019  8:00 PM  
Hub Color/Line Status Blue;Patent; Flushed;Capped 11/24/2019  8:00 PM  
Action Taken Open ports on tubing capped 11/24/2019  8:00 PM  
Alcohol Cap Used Yes 11/24/2019  8:00 PM  
      
[place] Drain #1 11/18/19 Lower;Mid Chest (Active) Dressing Status Clean, dry, & intact 11/24/2019  8:00 PM  
Drainage Description Serosanguinous 11/24/2019  8:00 PM  
Fu[place] Drain Airleak No 11/24/2019  8:00 PM  
Status Patent;Draining;Suction (specify 11/24/2019  8:00 PM  
 Y Connector Used Yes 11/24/2019  8:00 PM  
   
[place] Neighbor #2 11/18/19 Lower;Mid Chest (Active) Dressing Status Clean, dry, & intact 11/24/2019  8:00 PM  
Drainage Description Serosanguinous 11/24/2019  8:00 PM  
Fuad Drain Airleak No 11/24/2019  8:00 PM  
Status Patent;Draining;Suction (specify 11/24/2019  8:00 PM  
Y Connector Used Yes 11/24/2019  8:00 PM  
   
[place] Neighbor #3 11/18/19 Lower;Mid Chest (Active) Dressing Status Clean, dry, & intact 11/24/2019  8:00 PM  
Drainage Description Serosanguinous 11/24/2019  8:00 PM  
Fuad Drain Airleak No 11/24/2019  8:00 PM  
Status Patent;Draining;Suction (specify 11/24/2019  8:00 PM  
Y Connector Used Yes 11/24/2019  8:00 PM  
   
Farzana Isaiah Drain #4 11/18/19 Lower;Mid Chest (Active) Dressing Status Clean, dry, & intact 11/24/2019  8:00 PM  
Drainage Description Serosanguinous 11/24/2019  8:00 PM  
Fuad Drain Airleak No 11/24/2019  8:00 PM  
Status Patent;Draining;Suction (specify 11/24/2019  8:00 PM  
Y Connector Used Yes 11/24/2019  8:00 PM  
   
Jose E Neighbor #5 11/18/19 Lower;Mid Chest (Active) Dressing Status Clean, dry, & intact 11/24/2019  8:00 PM  
Drainage Description Serosanguinous 11/24/2019  8:00 PM  
Fuad Drain Airleak No 11/24/2019  8:00 PM  
Status Patent;Draining;Suction (specify 11/24/2019  8:00 PM  
Y Connector Used Yes 11/24/2019  8:00 PM  
Drainage Chamber Level (ml) 1050 ml 11/25/2019  3:00 PM  
Output (ml) 30 ml 11/25/2019  3:00 PM  
           
 
 
 
Dressing/Packing:  Wound Chest Right-Dressing Type: 4 x 4;Other (Comment)(JOHN) (11/25/19 1500) Wound Perineum-Dressing Type: Open to air (11/24/19 0800) Wound Chest-Dressing Type: 4 x 4;Non adherent;Special tape (comment)(medipore) (11/24/19 2000) Wound Abdomen Left; Lower LVAD Driveline Exit Site 53/42/71-[id number] Type: 2 x 2;Transparent film (11/24/19 2000) Splint/Cast:  ] Other:  SEE CHART 
 
TRANSFERRED TO CVICU WITH CRNA AND O.R. CIRCULATOR. REPORT GIVEN TO Allegra Mccormick.

## 2019-11-25 NOTE — PROGRESS NOTES
Speech pathology note Reviewed chart and note patient NPO for brachial embolectomy today. Will hold dysphagia treatment until cleared for PO intake. Thank you. Jaycee Lockhart., CCC-SLP

## 2019-11-25 NOTE — PROGRESS NOTES
NYHA class IV A/C systolic heart failure Low EF (10%) Inotrope dependent Malnutrition  
LVAD Work-up Epistaxis Hematuria 
  
Still a little confused and sleepy at times Hgb and platelets look good PTT therapeutic INR looks good Creatinine normal 
 
Bilirubin and other LFTs continues to improve Lactic acid normal 
 
LDH looks good NT pro-BNP coming down CXR - mild pulmonary edema Visit Vitals BP (!) 69/54 (BP 1 Location: Right arm, BP Patient Position: At rest) Pulse (!) 103 Temp 97.6 °F (36.4 °C) Resp 20 Ht 6' 2\" (1.88 m) Wt 197 lb 6.4 oz (89.5 kg) SpO2 96% BMI 25.34 kg/m² LVAD Pump Speed (RPM): 5200 Pump Flow (LPM): 4.5 MAP: 64 
PI (Pulsitility Index): 3.1 Power: 3.6  Test: Yes 
Back Up  at Bedside & Labeled: Yes Power Module Test: Yes Driveline Site Care: No 
Driveline Dressing: Clean, Dry, and Intact Outpatient: No 
MAP in Therapeutic Range (Outpatient): Yes Testing Alarms Reviewed: Yes 
Back up SC speed: 5200 Back up Low Speed Limit: 4800 Emergency Equipment with Patient?: Yes Emergency procedures reviewed?: Yes Drive line site inspected?: No 
Drive line intergrity inspected?: Yes Drive line dressing changed?: No 
 
 
Intake/Output Summary (Last 24 hours) at 11/25/2019 1524 Last data filed at 11/25/2019 1500 Gross per 24 hour Intake 1271.17 ml Output 5430 ml Net -4158.83 ml Risk of morbidity and mortality - high Medical decision making - high complexity Total critical care time - 30 minutes (CPT 07021)

## 2019-11-25 NOTE — ANESTHESIA PREPROCEDURE EVALUATION
Anesthetic History No history of anesthetic complications Review of Systems / Medical History Patient summary reviewed, nursing notes reviewed and pertinent labs reviewed Pulmonary Within defined limits Neuro/Psych Within defined limits Cardiovascular Hypertension CHF Dysrhythmias Comments: S/p LVAD 
EF 16-20%, NYHA Class IV , hx of VF arrest - s/p AICD 
- Impella insertion 10/29- removed 11/18  
- s/p LVAD placement on 11/18 In ICU on bival, milrinone, bumex drip, digoxin. GI/Hepatic/Renal 
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 Physical Exam 
 
Airway Mallampati: II 
TM Distance: 4 - 6 cm Neck ROM: normal range of motion Mouth opening: Normal 
 
 Cardiovascular Rhythm: regular Rate: normal 
 
 
 
 Dental 
No notable dental hx Pulmonary Breath sounds clear to auscultation Abdominal 
Abdominal exam normal 
 
 
 Other Findings Anesthetic Plan ASA: 4 Anesthesia type: MAC Induction: Intravenous Anesthetic plan and risks discussed with: Patient Plan for local with minimal sedation, possible arterial line.

## 2019-11-25 NOTE — PROGRESS NOTES
2000: Report received from Tony Brown, KATRIN. Milrinone, bumex, and bival drips verified. Pt being diuresed, K low - KCl replete currently infusing. Pt in bed, lethargic, sleeping, but is easily arouseable. Pt on 6L NC sating mid 90s. Pt home CPAP at bedside. PTT therapeutic, next PTT draw at 0400. Pt has JACLYN brice MD aware. ..plan for thrombectomy tmw (11/25)  at 1400 w/ Dr. Suraj Rojo - NPO except sips w/ meds 8h prior to procedure. 2122: Pt verbalizing 6/10 CP. ..pt lethargic. PRN tylenol given. 2145: CPAP mask applied, pressure 9 and 6L O2 bled into CPAP. 2345: Pt removed CPAP, 6L NC applied. 0000: Made pt NPO except sips w/ meds. 0400: Condom catheter accidentally removed, pt incontinent urine. Bathed and new condom catheter placed (35 mm, 30 mm would have worked as well). 0422: Pt sating 100%, weaned to 4L NC. 
 
0545: K & Mg both low, will replete. PTT therapeutic, next PTT draw due at 1600 today. INR 1.9, nearing 2.0 goal. 
 
0630: Dr. Suraj Rojo at bedside, plan to keep bival drip. 
 
0705: OOB to scale to recliner, x2 assist. Pt very weak and shaky, leaning on right leg - stood up straight w/ direction. 0800: Bedside and Verbal shift change report given to Philip Guidry, RN. Report included the following information SBAR, Intake/Output, MAR, Recent Results, Cardiac Rhythm Paced and Alarm Parameters . Problem: Falls - Risk of 
Goal: *Absence of Falls Description Document Edgar Brunner Fall Risk and appropriate interventions in the flowsheet. Outcome: Progressing Towards Goal 
Note: Fall Risk Interventions: 
Mobility Interventions: Communicate number of staff needed for ambulation/transfer, Patient to call before getting OOB, PT Consult for mobility concerns, Strengthening exercises (ROM-active/passive), Utilize gait belt for transfers/ambulation Mentation Interventions: Adequate sleep, hydration, pain control, Evaluate medications/consider consulting pharmacy, Eyeglasses and hearing aids, Increase mobility, More frequent rounding, Reorient patient, Room close to nurse's station, Toileting rounds, Update white board Medication Interventions: Evaluate medications/consider consulting pharmacy, Patient to call before getting OOB, Teach patient to arise slowly Elimination Interventions: Call light in reach, Patient to call for help with toileting needs, Toileting schedule/hourly rounds History of Falls Interventions: Consult care management for discharge planning, Evaluate medications/consider consulting pharmacy, Room close to nurse's station Problem: Pain Goal: *Control of Pain Outcome: Progressing Towards Goal 
Note:  
Lidocaine patch, PRN tylenol. Pt able to sleep and find relief. Problem: Pressure Injury - Risk of 
Goal: *Prevention of pressure injury Description Document Mich Scale and appropriate interventions in the flowsheet. Outcome: Progressing Towards Goal 
Note: Pressure Injury Interventions: 
Sensory Interventions: Assess changes in LOC, Check visual cues for pain, Discuss PT/OT consult with provider, Float heels, Keep linens dry and wrinkle-free, Maintain/enhance activity level, Minimize linen layers, Pressure redistribution bed/mattress (bed type), Turn and reposition approx. every two hours (pillows and wedges if needed) Moisture Interventions: Absorbent underpads, Check for incontinence Q2 hours and as needed, Internal/External urinary devices, Maintain skin hydration (lotion/cream), Minimize layers Activity Interventions: Increase time out of bed, PT/OT evaluation, Pressure redistribution bed/mattress(bed type) Mobility Interventions: Float heels, HOB 30 degrees or less, Pressure redistribution bed/mattress (bed type), PT/OT evaluation, Turn and reposition approx. every two hours(pillow and wedges) Nutrition Interventions: Document food/fluid/supplement intake, Offer support with meals,snacks and hydration Friction and Shear Interventions: Lift sheet, Minimize layers, Apply protective barrier, creams and emollients, HOB 30 degrees or less Problem: Infection - Risk of, Multi-drug Resistant Organism Colonization (MDRO) Goal: *Absence of MDRO colonization Outcome: Progressing Towards Goal 
Goal: *Absence of infection signs and symptoms Outcome: Progressing Towards Goal 
Note: Afebrile. Incisional sites CDI. ID following. Pt receiving Ancef IV. Problem: Heart Failure: Discharge Outcomes Goal: *Demonstrates ability to perform prescribed activity without shortness of breath or discomfort Outcome: Progressing Towards Goal 
Goal: *Left ventricular function assessment completed prior to or during stay, or planned for post-discharge Outcome: Progressing Towards Goal 
Goal: *Verbalizes understanding and describes prescribed diet Outcome: Progressing Towards Goal 
Goal: *Verbalizes understanding/describes prescribed medications Outcome: Progressing Towards Goal 
Goal: *Describes available resources and support systems Description 
(eg: Home Health, Palliative Care, Advanced Medical Directive) Outcome: Progressing Towards Goal 
  
Problem: Diabetes Self-Management Goal: *Disease process and treatment process Description Define diabetes and identify own type of diabetes; list 3 options for treating diabetes. Outcome: Progressing Towards Goal 
Goal: *Incorporating nutritional management into lifestyle Description Describe effect of type, amount and timing of food on blood glucose; list 3 methods for planning meals. Outcome: Progressing Towards Goal 
Goal: *Incorporating physical activity into lifestyle Description State effect of exercise on blood glucose levels. Outcome: Progressing Towards Goal 
Goal: *Using medications safely Description State effect of diabetes medications on diabetes; name diabetes medication taking, action and side effects.  
Outcome: Progressing Towards Goal 
 Goal: *Monitoring blood glucose, interpreting and using results Description Identify recommended blood glucose targets  and personal targets. Outcome: Progressing Towards Goal 
Goal: *Prevention, detection, treatment of acute complications Description List symptoms of hyper- and hypoglycemia; describe how to treat low blood sugar and actions for lowering  high blood glucose level. Outcome: Progressing Towards Goal 
  
Problem: Nutrition Deficit Goal: *Optimize nutritional status Outcome: Progressing Towards Goal 
  
Problem: Nutrition Deficit Goal: *Optimize nutritional status Outcome: Progressing Towards Goal 
  
Problem: Nutrition Deficit Goal: *Optimize nutritional status Outcome: Progressing Towards Goal 
  
Problem: LVAD Post-op 5 to 7 Days Goal: Off Pathway (Use only if patient is Off Pathway) Outcome: Progressing Towards Goal 
Note: POD 6-7. Milrinone drip. [place] and MAC d/c'd. PICC line in place. Bival gtt, PTT therapeutic. Pt receiving coumadin PO. Crackles noted to bases of lungs. Pt on 6L NC sating mid 90s. Pt being diuresed, bumex drip. Lidocaine patch and PRN tylenol for pain. LUE thrombus present - plan for thrombectomy tmw at 1400 (11/25). Incisions appear CDI. Afebrile. ID following. OOB w/ 2 assist. PT/OT working w/ pt. Ambulated today w/ PT. Tolerates sitting up in chair. DOUGLAS. Cardiac diet. Okay appetite. Problem: LVAD Post-op Day 4/Transfer Day Goal: Off Pathway (Use only if patient is Off Pathway) Outcome: Resolved/Not Met 
Goal: *Lungs clear or at baseline Outcome: Resolved/Not Met Problem: Infection - Risk of, Urinary Catheter-Associated Urinary Tract Infection Goal: *Absence of infection signs and symptoms Outcome: Resolved/Met Problem: LVAD Post-op Day 4/Transfer Day Goal: Activity/Safety Outcome: Resolved/Met Goal: Diagnostic Test/Procedures Outcome: Resolved/Met Goal: Nutrition/Diet Outcome: Resolved/Met Goal: Medications Outcome: Resolved/Met Goal: Discharge Planning Outcome: Resolved/Met Goal: Respiratory Outcome: Resolved/Met Goal: Treatments/Interventions/Procedures Outcome: Resolved/Met Goal: Psychosocial 
Outcome: Resolved/Met Goal: *Hemodynamically stable Outcome: Resolved/Met Goal: *Adequate oxygenation Outcome: Resolved/Met Goal: *Optimal pain control at patient's stated goal 
Outcome: Resolved/Met Goal: *Incisions without signs and symptoms of wound complication Outcome: Resolved/Met Goal: *Tolerating diet Outcome: Resolved/Met Goal: *Demonstrates progressive activity Outcome: Resolved/Met

## 2019-11-25 NOTE — PROGRESS NOTES
Chart reviewed and noted patient plan for thrombectomy today of LUE. Per chart, pre-op check list completed and patient to OR soon. Will follow up for OT intervention tomorrow as able. Thank you.

## 2019-11-25 NOTE — BRIEF OP NOTE
BRIEF OPERATIVE NOTE Date of Procedure: 11/25/2019 Preoperative Diagnosis: THROMBOSIS LEFT BRACHIAL ARTERY Postoperative Diagnosis: THROMBOSIS LEFT BRACHIAL ARTERY Procedure(s): LEFT BRACHIAL THROMBECTOMY Surgeon(s) and Role: 
   Celestino Brandt MD - Primary Surgical Assistant: Margaret Lira Surgical Staff: 
Circ-1: Deirdre Maurer RN 
Circ-2: Kelley Santa Radiology Technician: Gi Hathaway RT Scrub Tech-1: Adenike Kim Surg Asst-1: Sol León Surg Asst-2: Bill Fuentes Time In Time Out Incision Start 11/25/2019 1601 Incision Close 11/25/2019 1629 Anesthesia: General  
Estimated Blood Loss: minimal 
Specimens: * No specimens in log * Findings: clot in brachial artery Complications: none Implants: * No implants in log *

## 2019-11-26 NOTE — ANESTHESIA POSTPROCEDURE EVALUATION
Post-Anesthesia Evaluation and Assessment Patient: Anand Singh MRN: [id number]  SSN: [id number] YOB: 1950  Age: 71 y.o. Sex: male I have evaluated the patient and they are stable and ready for discharge from the PACU. Cardiovascular Function/Vital Signs Visit Vitals BP (!) 74/56 Pulse (!) 103 Temp 36.8 °C (98.3 °F) Resp 11 Ht 6' 2\" (1.88 m) Wt 89 kg (196 lb 1.6 oz) SpO2 100% BMI 25.18 kg/m² Patient is status post General anesthesia for Procedure(s): LEFT BRACHIAL THROMBECTOMY. Nausea/Vomiting: None Postoperative hydration reviewed and adequate. Pain: 
Pain Scale 1: Numeric (0 - 10) (11/26/19 0100) Pain Intensity 1: 0 (11/26/19 0100) Managed Neurological Status:  
Neuro Neurologic State: Sleeping;Eyes open to voice; Eyes open spontaneously (11/26/19 0400) Orientation Level: Oriented X4 (11/25/19 2000) Cognition: Follows commands (11/25/19 2000) Speech: Clear (11/25/19 2000) Assessment L Pupil: Brisk;Round (11/25/19 2000) Size L Pupil (mm): 2 (11/25/19 2000) Assessment R Pupil: Brisk;Round (11/25/19 2000) Size R Pupil (mm): 2 (11/25/19 2000) LUE Motor Response: Spontaneous ; Purposeful (11/25/19 2000) LLE Motor Response: Spontaneous ; Purposeful (11/25/19 2000) RUE Motor Response: Spontaneous ; Purposeful (11/25/19 2000) RLE Motor Response: Spontaneous ; Purposeful (11/25/19 2000) At baseline Mental Status, Level of Consciousness: Alert and  oriented to person, place, and time Pulmonary Status:  
O2 Device: Nasal cannula (11/26/19 0640) Adequate oxygenation and airway patent Complications related to anesthesia: None Post-anesthesia assessment completed. No concerns Signed By: Mesfin Vasquez MD   
 November 26, 2019

## 2019-11-26 NOTE — PROGRESS NOTES
1945: Report receiving from Nel Moss, KATRIN. Milrinone, bival, and bumex drips verified. Pt in bed, sleeping, awakens to voice, calm and cooperative, and on 4L NC sating mid-high 90s. Plan to give albumin when cefepime dose complete. Plan to transduce CVP per HF team. 
 
2042: Baby albumin hung. 
 
2200: . 
 
2215: LVAD dressing changed. 2 small skin tears noted under LVAD dressing. Scant bleed noted from tears. [id number]: [place] [facility] [place] soft [place]. 
 
0003: Pt verbalizing 7/10 CP and left arm pain. PRN tylenol given. 0100: Left arm dressing appears to be too tight, pt requested to have it taken off. Changed dressing. Incision w/ scant sanguinous ooze. JACLYN hickman.  
 
0400: Doppler MAP 92. LVAD #s WDL, will continue to monitor. 0540: PTT therapeutic, no changes made to bival drip. INR 2.4. 
 
0545: Son, Theresa Wilkinson, at bedside before heading to work. 2323: Dr. Devin Hutton at bedside, plan to keep dressing on left arm for a least 24h. 
 
0640: Pt sating mid 90s, weaned to 3L NC. 
 
0815: Bedside and Verbal shift change report given to Anthony Conrad RN & Vijaya KAYE RN. Candance Huger Report included the following information SBAR, Intake/Output, MAR, Recent Results, Cardiac Rhythm Paced and Alarm Parameters . Problem: Falls - Risk of 
Goal: *Absence of Falls Description Document Redmacarena Curet Fall Risk and appropriate interventions in the flowsheet. Outcome: Progressing Towards Goal 
Note: Fall Risk Interventions: 
Mobility Interventions: Communicate number of staff needed for ambulation/transfer, Patient to call before getting OOB, PT Consult for mobility concerns, Strengthening exercises (ROM-active/passive) Mentation Interventions: Adequate sleep, hydration, pain control, Evaluate medications/consider consulting pharmacy, Eyeglasses and hearing aids, Increase mobility, More frequent rounding, Reorient patient, Room close to nurse's station, Toileting rounds, Update white board Medication Interventions: Evaluate medications/consider consulting pharmacy, Patient to call before getting OOB, Teach patient to arise slowly Elimination Interventions: Call light in reach, Patient to call for help with toileting needs, Toileting schedule/hourly rounds History of Falls Interventions: Consult care management for discharge planning, Evaluate medications/consider consulting pharmacy, Room close to nurse's station Problem: Pain Goal: *Control of Pain Outcome: Progressing Towards Goal 
  
Problem: Pressure Injury - Risk of 
Goal: *Prevention of pressure injury Description Document Mich Scale and appropriate interventions in the flowsheet. Outcome: Progressing Towards Goal 
Note: Pressure Injury Interventions: 
Sensory Interventions: Assess changes in LOC, Check visual cues for pain, Float heels, Keep linens dry and wrinkle-free, Maintain/enhance activity level, Minimize linen layers, Monitor skin under medical devices, Pressure redistribution bed/mattress (bed type), Turn and reposition approx. every two hours (pillows and wedges if needed) Moisture Interventions: Absorbent underpads, Check for incontinence Q2 hours and as needed, Internal/External urinary devices, Maintain skin hydration (lotion/cream), Minimize layers Activity Interventions: Increase time out of bed, Pressure redistribution bed/mattress(bed type), PT/OT evaluation, Chair cushion Mobility Interventions: Pressure redistribution bed/mattress (bed type), PT/OT evaluation, Turn and reposition approx. every two hours(pillow and wedges), Chair cushion Nutrition Interventions: Document food/fluid/supplement intake, Offer support with meals,snacks and hydration Friction and Shear Interventions: Apply protective barrier, creams and emollients, Lift sheet, Minimize layers Problem: Infection - Risk of, Multi-drug Resistant Organism Colonization (MDRO) Goal: *Absence of MDRO colonization Outcome: Progressing Towards Goal 
 Goal: *Absence of infection signs and symptoms Outcome: Progressing Towards Goal 
  
Problem: Heart Failure: Discharge Outcomes Goal: *Demonstrates ability to perform prescribed activity without shortness of breath or discomfort Outcome: Progressing Towards Goal 
Goal: *Left ventricular function assessment completed prior to or during stay, or planned for post-discharge Outcome: Progressing Towards Goal 
Goal: *ACEI prescribed if LVEF less than 40% and no contraindications or ARB prescribed Outcome: Progressing Towards Goal 
Goal: *Verbalizes understanding and describes prescribed diet Outcome: Progressing Towards Goal 
Goal: *Verbalizes understanding/describes prescribed medications Outcome: Progressing Towards Goal 
Goal: *Describes available resources and support systems Description 
(eg: Home Health, Palliative Care, Advanced Medical Directive) Outcome: Progressing Towards Goal 
  
Problem: Nutrition Deficit Goal: *Optimize nutritional status Outcome: Progressing Towards Goal 
  
Problem: Nutrition Deficit Goal: *Optimize nutritional status Outcome: Progressing Towards Goal 
  
Problem: Nutrition Deficit Goal: *Optimize nutritional status Outcome: Progressing Towards Goal 
  
Problem: LVAD Post-op 5 to 7 Days Goal: *Hemodynamically stable Outcome: Progressing Towards Goal 
Note:  
Milrinone drip. [place] and MAC d/c'd. Transducing CVP, 6-10. Goal: *Lungs clear or at baseline Outcome: Progressing Towards Goal 
Note:  
Bumex drip. PRN BT. Daily CXR. Coarse breath sounds. Encouraging IS. Goal: *Adequate oxygenation Outcome: Progressing Towards Goal 
Note:  
4L NC, sating mid-high 90s. Goal: *Optimal pain control at patient's stated goal 
Outcome: Progressing Towards Goal 
Note: PRN tylenol. Narcotics d/c'd d/t lethargy. Pt able to find pain relief and sleep. Goal: *Incisions without signs and symptoms of wound complication Outcome: Progressing Towards Goal 
Note: Surgical incisions CDI. WBD count trending up, pt receiving abx. Goal: *Tolerating diet Outcome: Progressing Towards Goal 
Note:  
Pt receiving marinol PO. Appetite improved. Goal: *Demonstrates progressive activity Outcome: Progressing Towards Goal 
Note: OOB w/ 2 assist. Pt weak and shaky. Working w/ PT/OT. Problem: LVAD Post-op Day 4/Transfer Day Goal: Off Pathway (Use only if patient is Off Pathway) Outcome: Resolved/Met Goal: *Lungs clear or at baseline Outcome: Resolved/Met

## 2019-11-26 NOTE — PROGRESS NOTES
Problem: Mobility Impaired (Adult and Pediatric) Goal: *Acute Goals and Plan of Care (Insert Text) Description FUNCTIONAL STATUS PRIOR TO ADMISSION: Patient was modified independent using a single point cane for functional mobility. Patient reports an increasingly sedentary lifestyle 2* fatigue and SOB/dyspnea. Retired (so is his wife). Patient reports x 3 falls within the last couple of weeks. Patient is wearing either nasal cannula or CPAP at night. LVAD work-up has been initiated. HOME SUPPORT PRIOR TO ADMISSION: The patient lived with his wife, but did not require physical assistance. Physical Therapy Goals: 
 
Re-evaluation completed 11/20/2019 and new goals formulated following LVAD implantation. 1.  Patient will move from supine to sit and sit to supine, scoot up and down and roll side to side in bed with minimal assistance/contact guard assist within 7 days. 2.  Patient will perform sit to/from stand with minimal assistance/contact guard assist within 7 days. 3.  Patient will ambulate 150 feet with least restrictive assistive device and minimal assistance/contact guard assist within 7 days. 4.  Patient will ascend/descend 4 stairs with handrail(s) with minimal assistance/contact guard assist within 7 days. 5.  Patient will perform cardiac exercises per protocol with supervision within 7 days. 6.  Patient will verbally and functionally recall 3/3 sternal precautions within 7 days. 7.  Patient will perform power exchange for power module to/from battery with moderate assistance  within 7 days. Initiated 10/27/2019; updated 11/15/2019 1. Patient will move from supine to sit and sit to supine, scoot up and down and roll side to side in bed with independence within 7 days. 2.  Patient will perform sit to/from stand with supervision/set-up within 7 days. 3.  Patient will ambulate 200 feet with least restrictive assistive device and supervision/set-up within 7 days. 4.  Patient will ascend/descend 4 stairs with  handrail(s) with supervision/set-up within 7 days for functional strengthening and community reintegration. Pino Kaiser 5.  Patient will verbally and functionally recall 3/3 sternal precautions within 7 days in preparation for LVAD implantation. 6.  Patient will perform a mock power exchange for power module to/from battery with supervision/set-up within 7 days in preparation for LVAD implantation. 1.  Patient will move from supine to sit and sit to supine, scoot up and down and roll side to side in bed with independence within 7 days. 2.  Patient will perform sit to/from stand with supervision/set-up within 7 days. 3.  Patient will ambulate 150 feet with least restrictive assistive device and supervision/set-up within 7 days. 4.  Patient will ascend/descend 4 stairs with  handrail(s) with supervision/set-up within 7 days for functional strengthening and community reintegration. Pino Kaiser 5.  Patient will verbally and functionally recall 3/3 sternal precautions within 7 days in preparation for LVAD implantation. 6.  Patient will perform a mock power exchange for power module to/from battery with supervision/set-up within 7 days in preparation for LVAD implantation. Outcome: Progressing Towards Goal 
 
PHYSICAL THERAPY TREATMENT Patient: Radha Granda (75 y.o. male) Date: 11/26/2019 Diagnosis: [facility] [I50.9] <principal problem not specified> Procedure(s) (LRB): LEFT BRACHIAL THROMBECTOMY (Left) 1 Day Post-Op Precautions: Fall, Sternal 
Chart, physical therapy assessment, plan of care and goals were reviewed. ASSESSMENT Patient continues with skilled PT services and is progressing towards goals. Pt was able to increase gait tolerance. Pt has very edematous LE making advancing LE difficult. Pt fatigued with task with decrease LE stability. Pt had altered COG.  Pt is requiring assistance of two for safe mobility. Will continue to progress as able. Current Level of Function Impacting Discharge (mobility/balance): mod A x2 Other factors to consider for discharge: LVAD, decrease activiyt tolerance, edematous PLAN : 
Patient continues to benefit from skilled intervention to address the above impairments. Continue treatment per established plan of care. to address goals. Recommendation for discharge: (in order for the patient to meet his/her long term goals) Therapy 3 hours per day 5-7 days per week This discharge recommendation: 
Has not yet been discussed the attending provider and/or case management IF patient discharges home will need the following DME: to be determined (TBD) SUBJECTIVE:  
Patient stated I need a break.  OBJECTIVE DATA SUMMARY:  
Critical Behavior: 
Neurologic State: Alert Orientation Level: Oriented X4 Cognition: Follows commands Safety/Judgement: Decreased insight into deficits Functional Mobility Training: 
Bed Mobility: 
  
  
  
  
  
  
Transfers: 
Sit to Stand: Minimum assistance;Assist x2 Stand to Sit: Minimum assistance;Assist x2 Sit to stand standing tolerance post gait, increase A/P sway Balance: 
Sitting: Intact; With support Standing: Impaired Standing - Static: Poor Standing - Dynamic : Poor Ambulation/Gait Training: 
Distance (ft): 18 Feet (ft) Assistive Device: Gait belt Ambulation - Level of Assistance: Moderate assistance;Assist x2 Gait Abnormalities: Decreased step clearance; Path deviations(decrease LE stability) Base of Support: Center of gravity altered Stairs: 
  
  
   
 
 
Pain Rating: 
Not complaints Activity Tolerance:  
Limited Please refer to the flowsheet for vital signs taken during this treatment. After treatment patient left in no apparent distress:  
Sitting in chair and Call bell within reach COMMUNICATION/COLLABORATION:  
 The patients plan of care was discussed with: Physical Therapist, Occupational Therapist, and Registered Nurse Geoff Hughes, SHANTI Time Calculation: 23 mins

## 2019-11-26 NOTE — PROGRESS NOTES
Problem: Dysphagia (Adult) Goal: *Acute Goals and Plan of Care (Insert Text) Description Speech Pathology Re-evaluation 11/26/2019 1. Patient will tolerate regular/thin liquid diet with no overt s/s aspiration within 7 days Re-evaluation 11/20/2019 1. Patient will tolerate mechanical soft/thin liquid diet with no overt s/s aspiration within 7 days. MET Initiated 10/28/19 1. Patient will tolerate regular diet/thin liquids without overt s/s of aspiration within 7 days MET 2. Patient will participate in [facility] for further objective assessment of swallow physiology within 7 days (once medically stable from Impella/cardiac sx standpoint) NOT MET; discontinue Outcome: Progressing Towards Goal 
  
SPEECH LANGUAGE PATHOLOGY DYSPHAGIA TREATMENT: WEEKLY REASSESSMENT Patient: Reid Baumann (75 y.o. male) Date: 11/26/2019 Diagnosis: Acute decompensated heart failure ([facility] [I50.9] <principal problem not specified> Procedure(s) (LRB): LEFT BRACHIAL THROMBECTOMY (Left) 1 Day Post-Op Precautions: swallow, Fall, Sternal 
 
ASSESSMENT: 
Patient with suspected mild-moderate pharyngeal dysphagia characterized by delayed swallow initiation and decreased laryngeal elevation. Patient with delayed, productive cough x1, however question relation to aspiration. Provided re-education regarding compensatory strategies, including no straws and small/single sips. Patient verbalized understanding. Patient's progression toward goals since last assessment: Good. Will continue to follow 1x/week for diet tolerance and readiness for MBS. PLAN: 
Goals have been updated based on progression since last assessment. Patient continues to benefit from skilled intervention to address the above impairments. Continue to follow the patient 1 time a week to address goals. Recommendations and Planned Interventions: 
--Regular/thin liquid diet, no straws, small/single sips --SLP to follow up for diet tolerance and readiness for MBS as medically appropriate Discharge Recommendations: To Be Determined SUBJECTIVE:  
Patient stated Layne Zepeda made me a [employer] float.  OBJECTIVE:  
Cognitive and Communication Status: 
Neurologic State: Alert Orientation Level: Oriented X4 Cognition: Follows commands Perception: Appears intact Perseveration: No perseveration noted Safety/Judgement: Decreased insight into deficits Dysphagia Treatment: P.O. Trials: 
Patient Position: upright in chair Vocal quality prior to P.O.: No impairment Consistency Presented: Thin liquid;Puree How Presented: Self-fed/presented;Cup/sip;Spoon Bolus Acceptance: No impairment Bolus Formation/Control: No impairment Propulsion: No impairment Oral Residue: None Initiation of Swallow: Delayed (# of seconds) Laryngeal Elevation: Decreased Aspiration Signs/Symptoms: Other (comment)(delayed, productive cough x1) Pain: 
Pain Scale 1: Numeric (0 - 10) Pain Intensity 1: 7 Pain Location 1: Arm After treatment:  
[x]                Patient left in no apparent distress sitting up in chair 
[]                Patient left in no apparent distress in bed 
[x]                Call bell left within reach [x]                Nursing notified 
[]                Caregiver present 
[]                Bed alarm activated COMMUNICATION/EDUCATION:  
The patients plan of care including recommendations, planned interventions, and recommended diet changes were discussed with: Registered Nurse. Harshil Yao SLP Time Calculation: 10 mins

## 2019-11-26 NOTE — PROGRESS NOTES
Vascular: 
 
Stable POD #1 after left brachial thrombectomy. Dressing intact, hand warm Continue current care from my standpoint

## 2019-11-26 NOTE — PROGRESS NOTES
NUTRITION COMPLETE ASSESSMENT 
 
RECOMMENDATIONS:  
1. Assist with tray-set up as needed with encouragement of supplements/high protein foods 2. Add \"artifical saliva\" PRN with complaints of dry mouth 3. Daily weights Interventions/Plan:  
Food/Nutrient Delivery:  (snacks, extra sauce/ gravy) Commercial supplement(d/c ) Assessment:  
Reason for Assessment: Reassessment Diet: cardiac Supplements: [employer] 1x/day and Boost Pudding 1x/day Nutritionally Significant Medications: [x] Reviewed & Includes: albumin, cefepime, bumex, marinol (5mg BID), retacrit, SSI, mag ox, remeron, protonix, miralax, KCl, pericolace, carafate 4x/day; zofran Meal Intake:  
Patient Vitals for the past 100 hrs: 
 % Diet Eaten 11/24/19 1800 80 % 11/24/19 1300 60 % 11/23/19 1800 40 % 11/23/19 0800 80 % 11/22/19 1800 40 % Current Hospitalization:  
Appetite: Fair PO Ability: Independent Average po intake:50-75% Average supplements intake: 0 Subjective: \"I do ok with breakfast but no so much with lunch and dinner. My mouth is so dry the lunch/dinner foods are harder. ... I don't like the Magic Cup/Boost Pudding. Regular ice cream is good. \" 
 
Objective: 
Pt admitted for CHF. PMHx: HTN, CKD, chronic systolic heart failure, depression, others noted. S/p removal of impella (placed 10/29) and LVAD placement on 11/18. Remains on milrinone. Renal follow for JUAN J on CKD. Bumex continues with fluid overload/hyponatremia. L arm clot s/p thrombectomy on 11/25. Marinol resumed today (started prior to surgery), dose increased today, and remeron added 11/23. Diet advanced to regular. Did not like supplements so will d/c in place for snacks (ice cream, chicken salad, etc). Pt biggest complain is dry mouth - consider trial of artifical saliva PRN. Extra gravy/sauce added to trays. Severe wt loss over past 6 months. Pt reports related to fluid fluctuations.  Had been eating well PTA however noticeable muscle/fat wasting. Estimated Nutrition Needs:  
Kcals/day: 2000 Kcals/day([id number] (MSJ x 1.1-1.2) Protein: 100 g(1.2g/kg) Fluid: (1 ml/kcal) Based On: [place] 1898 Weight Used: Actual wt(83 kg) Pt expected to meet estimated nutrient needs:  []   Yes     []  No [x] Unable to predict at this time Nutrition Diagnosis:  
1. Unintended weight loss(malnutrition) related to CHF vs depression vs malignancy as evidenced by >10% weight loss x 6 months; severe muscle/fat wasting 2. Inadequate oral intake(improving slowly) related to poor appetite as evidenced by post-op LVAD, marinol rx, >50% meals consumed Goals:   
 Consumption of at least 75% meals and 1 snack per day in 5-7 days Monitoring & Evaluation: - Total energy intake, Protein intake - Weight/weight change(chewing) Previous Nutrition Goals Met:   Progressing Previous Recommendations:    Yes 
 
Education & Discharge Needs: 
 [] None Identified 
 [x] Identified and addressed [x] Participated in care plan, discharge planning, and/or interdisciplinary rounds Cultural, Confucianist and ethnic food preferences identified: None Skin Integrity: []Intact  [x]Other: sternal wound Edema: []None [x]Other: 1-2+ LUE, 2+ BLLE Last BM: 11/26 Food Allergies: [x]None []Other Diet Restrictions: Cultural/Oriental orthodox Preference(s): None Anthropometrics:   
Weight Loss Metrics 11/26/2019 10/25/2019 10/25/2019 10/25/2019 9/30/2019 9/18/2019 9/16/2019 Today's Wt 196 lb 1.6 oz - 193 lb 6.4 oz - 199 lb 14.4 oz 196 lb 199 lb BMI - 25.18 kg/m2 - 24.83 kg/m2 25.67 kg/m2 25.16 kg/m2 25.55 kg/m2 Weight Source: Standing scale (comment) Height: 6' 2\" (188 cm), Body mass index is 25.18 kg/m². IBW : 86.2 kg (190 lb), % IBW (Calculated): 96.32 % 
 ,   
 
Labs:   
Lab Results Component Value Date/Time  Sodium 134 (L) 11/26/2019 04:36 AM  
 Potassium 4.4 11/26/2019 04:36 AM  
 Chloride 97 11/26/2019 04:36 AM  
 CO2 33 (H) 11/26/2019 04:36 AM  
 Glucose 161 (H) 11/26/2019 04:36 AM  
 BUN 20 11/26/2019 04:36 AM  
 Creatinine 1.10 11/26/2019 04:36 AM  
 Calcium 8.6 11/26/2019 04:36 AM  
 Magnesium 2.1 11/26/2019 04:36 AM  
 Phosphorus 3.5 11/26/2019 04:36 AM  
 Albumin 2.1 (L) 11/26/2019 04:36 AM  
 
Lab Results Component Value Date/Time Hemoglobin A1c 6.6 (H) 10/25/2019 02:56 PM  
 
 
Tesha Reyes, RD [address] [place] , Pager #[phone] or [phone]

## 2019-11-26 NOTE — PROGRESS NOTES
ID Progress Note 
2019 Subjective:  
 
Remains off vent, looks good Objective: Antibiotics: 1. Levaquin 2. Rifampin 3. Fluconazole 4. Vancomycin 5. Cefazolin Vitals:  
Visit Vitals BP (!) 74/56 Pulse (!) 105 Temp 97.6 °F (36.4 °C) Resp 20 Ht 6' 2\" (1.88 m) Wt 89 kg (196 lb 1.6 oz) SpO2 99% BMI 25.18 kg/m² Tmax:  Temp (24hrs), Av.8 °F (36.6 °C), Min:97.2 °F (36.2 °C), Max:98.3 °F (36.8 °C) Exam:  Lungs:  clear to auscultation bilaterally Heart:  regular rate and rhythm Abdomen:  soft, non-tender. Bowel sounds normal. No masses,  no organomegaly Urine is clearing up Labs:     
Recent Labs  
  19 
0436 19 
0416 19 
6632 WBC 7.5 6.0 6.2 HGB 9.4* 9.5* 9.5*  170 134* BUN 20 20 22* CREA 1.10 1.04 1.08  
SGOT 11* 14* 16  
 105 104 TBILI 1.4* 1.4* 1.7* Cultures: No results found for: [facility] Lab Results Component Value Date/Time Culture result: NO GROWTH 5 DAYS 2019 11:18 AM  
 Culture result: SERRATIA MARCESCENS (A) 10/31/2019 10:05 AM  
 Culture result: SERRATIA MARCESCENS (2ND COLONY TYPE/STRAIN) (A) 10/31/2019 10:05 AM  
 Culture result: ENTEROCOCCUS FAECALIS GROUP D (A) 10/31/2019 10:05 AM  
 
 
Radiology:  
 
Line/Insert Date:        
 
Assessment:  
 
1. UTI--Serratia (2 biotypes) from culture and small amount of Enterococcus 2. CHF/cardiomyopathy Objective: 1. Continue current therapy Kvng Alford MD

## 2019-11-26 NOTE — PROGRESS NOTES
Problem: Self Care Deficits Care Plan (Adult) Goal: *Acute Goals and Plan of Care (Insert Text) Description FUNCTIONAL STATUS PRIOR TO ADMISSION: Patient was modified independent using a single point cane for functional mobility. Patient able to shower and dress himself. However, patient required assistance for household management from his wife. HOME SUPPORT: The patient lived alone with wife to provide assistance. Occupational Therapy Goals: 
 
Goals reviewed and continued/modified as follows 11/20/19 s/p LVAD implantation 1. Patient will perform upper body dressing including LVAD switchovers with moderate assistance within 7 day(s). 2.  Patient will perform lower body dressing with minimal assistance within 7 day(s). 3.  Patient will perform grooming with supervision/setup within 7 day(s). 4.  Patient will perform toilet transfers supervision/setupmodified independence within 7 day(s). 5.  Patient will perform all aspects of toileting with minimal assistance within 7 day(s). 6.  Patient will participate in upper extremity therapeutic exercise/activities with supervision/set-up for 5 minutes within 7 day(s). 7.  Patient will utilize energy conservation techniques during functional activities with verbal cues within 7 day(s). 8. Patient will verbalize LVAD terminology with verbal cues within 7 day (s). Goals reviewed and continued/modified as follows 11/12/19 1. Patient will perform bathing with supervision/set-up within 7 day(s). 2.  Patient will perform lower body dressing with supervision/set-up within 7 day(s). 3.  Patient will perform grooming with modified independence within 7 day(s). 4.  Patient will perform toilet transfers with modified independence within 7 day(s). 5.  Patient will perform all aspects of toileting with supervision/set-up within 7 day(s).  
6.  Patient will participate in upper extremity therapeutic exercise/activities with supervision/set-up for 5 minutes within 7 day(s). 7.  Patient will utilize energy conservation techniques during functional activities with verbal cues within 7 day(s). 8. Patient will verbalize LVAD terminology with verbal cues within 7 day (s) in preparation for implant. Continue all goals 10/30/19 post re-eval for Impella removal  
Initiated 10/28/2019 1. Patient will perform bathing with supervision/set-up within 7 day(s). 2.  Patient will perform lower body dressing with supervision/set-up within 7 day(s). 3.  Patient will perform grooming with modified independence within 7 day(s). 4.  Patient will perform toilet transfers with modified independence within 7 day(s). 5.  Patient will perform all aspects of toileting with supervision/set-up within 7 day(s). 6.  Patient will participate in upper extremity therapeutic exercise/activities with supervision/set-up for 5 minutes within 7 day(s). 7.  Patient will utilize energy conservation techniques during functional activities with verbal cues within 7 day(s). Outcome: Progressing Towards Goal 
 OCCUPATIONAL THERAPY TREATMENT Patient: Marta Ovalles (75 y.o. male) Date: 11/26/2019 Diagnosis: Acute decompensated heart failure ([facility] [address] [I50.9] <principal problem not specified> Procedure(s) (LRB): LEFT BRACHIAL THROMBECTOMY (Left) 1 Day Post-Op Precautions: Fall, Sternal 
Chart, occupational therapy assessment, plan of care, and goals were reviewed. ASSESSMENT Patient continues with skilled OT services and is progressing towards goals. Patient continues to present with generalized weakness, decreased endurance, and decreased activity tolerance s/p LVAD implantation and L brachial thrombectomy POD 1. OT received patient seated in recliner after having worked with PT and patient endorsing fatigue at this time, however agreeable to activity in the chair.  Patient completed 6/6 BUE cardiac exercises seated in the chair. Noted patient with 3+/4+ pitting edema in BLE today as well as with LUE edema. Encouraged patient to continue elevating BLE and performing ankle pumps/leg raises. Patient verbalizing 3/5 LVAD terms when asked by OT (needed assistance with power leads and drive-line, however correctly identified , batteries and battery clips). Continue to recommend IPR at discharge to maximize patient safety and independence with ADL transfers and tasks. Current Level of Function Impacting Discharge (ADLs): MAX A LB ADLs Other factors to consider for discharge: LVAD  III 
    
 
PLAN : 
Patient continues to benefit from skilled intervention to address the above impairments. Continue treatment per established plan of care. to address goals. Recommend with staff: OOB x 3 to chair; BUE cardiac exercises Recommend next OT session: LB ADL training; standing grooming task Recommendation for discharge: (in order for the patient to meet his/her long term goals) Therapy 3 hours per day 5-7 days per week This discharge recommendation: 
Has been made in collaboration with the attending provider and/or case management IF patient discharges home will need the following DME: to be determined (TBD) SUBJECTIVE:  
Patient stated My arm does not feel any better and it is swollen again.  OBJECTIVE DATA SUMMARY:  
Cognitive/Behavioral Status: 
Neurologic State: Alert Orientation Level: Oriented X4 Cognition: Appropriate for age attention/concentration Perception: Appears intact Perseveration: No perseveration noted Safety/Judgement: Decreased insight into deficits; Decreased awareness of need for safety;Decreased awareness of need for assistance Functional Mobility and Transfers for ADLs: 
 
 
Transfers: 
Sit to Stand: Minimum assistance;Assist x2 Balance: 
Sitting: Intact; With support Standing: Impaired Standing - Static: Poor Standing - Dynamic : Poor ADL Intervention: 
  
 
  
 
  
 
  
 
  
 
  
 
  
 
Cognitive Retraining Safety/Judgement: Decreased insight into deficits; Decreased awareness of need for safety;Decreased awareness of need for assistance The patient instructed and demonstrated 3/3 sternal precautions. Patient instructed no asymmetrical reaching over head to ensure B UEs when shoulders >90* i.e. reaching in cabinets and dressing. Instruction on upper body dressing techniques of over head, then arms through to decrease pain and unilateral shoulder flexion >90*. Instruction on the benefits of utilizing B UEs during functional tasks i.e. opening the fridge, stepping into the tub. Instruction if continued pain at home with shoulder IR for BM hygiene can use wet wipes and toilet tongs PRN. Avoid valsalva maneuvers. Increase activity tolerance for home, work, and sexual intercourse by pacing self with increasing the arm exercises, sitting duration, frequency OOB, walking, standing, and ADLs. Instructed and indicated understanding of s/s of too much activity, how to respond to s/s safely. Therapeutic Exercises:  
Patient instructed on the benefits and demonstrated cardiac exercises while seated with Stand-by assistance. Instructed and indicated understanding on how to progress reps, sets against gravity, working up to 5 lbs, standing and so on based on surgeon clearance for more weight in prep for basic and instrumental ADLs. Instruction on the use of household items in place of weights as needed. CARDIAC EXERCISE Sets Reps Active  Active Assist   
Passive  Self ROM Comments Shoulder flexion  1  5  [x]                            []                             []                             []                                 
Shoulder abduction  1  5  [x]                             []                             []                             []                               
 Scapular elevation  1  5  [x]                             []                              []                             []                               
Scapular retraction  1  5  [x]                             []                             []                             []                               
Trunk rotation  1  5  [x]                             []                             []                             []                               
Trunk sidebending  1  5  [x]                             []                              []                             []                                     
 
Activity Tolerance:  
Fair and requires rest breaks Please refer to the flowsheet for vital signs taken during this treatment. After treatment patient left in no apparent distress:  
Sitting in chair and Call bell within reach COMMUNICATION/COLLABORATION:  
The patients plan of care was discussed with: Physical Therapist and Registered Nurse Beth Shipley Time Calculation: 14 mins

## 2019-11-26 NOTE — PROGRESS NOTES
[facility] 
 [address] [address], [address] [facility] Phone: [phone]   [phone]   
  
Nephrology Progress Note Sona Kiser     1950     [id number] Date of Admission : 10/25/2019 
11/26/19 CC:  Follow up for JUAN J, CKD, Hyponatremia Assessment and Plan JUAN J on CKD 
- 2/2 CRS and urinary retention - Cr stable 
- agree with transitioning to bumex IV push TID 
- daily labs 
- keep neal for now Hyponatremia: 
- stable 
- from CHF 
- cont diuresis 
  
Gross hematuria, BPH w/ retention: 
- off CBI pre VAD. cysto pre op showed catheter related Cystitis @ postr wall  
- neal had to be replaced yesterday due to urinary retention 
- keep neal in for now 
- on flomax Acute on Chronic HFrEF  
- EF 16-20%, NYHA Class IV , hx of VF arrest - s/p AICD 
- Impella insertion 10/29- removed 11/18  
- s/p LVAD placement on 11/18 CKD Stage III  
- Mild Left renal atrophy at baseline Hoarseness, vocal cord paralysis, mediastinal adenopathy: 
- will need eventual PET/CT 
  
L arm clot s/p thrombectomy on 11/25 JOSE on CPAP  
  
PAF s/p DCCV 6/19 
  
Anemia: 
- from blood loss s/p multiple blood products - s/p IV iron,  Repeat iron studies ok 
- on PAVEL q7 d Serratia and Enteroccocus UTI: 
- completed abx Interval History:   
Seen and examined. Feeling ok. BP stable. Voiding well. Still w/ sig edema. No cp or sob reported. Arm feeling better this AM. Review of Systems: Pertinent items are noted in HPI. Current Medications:  
Current Facility-Administered Medications Medication Dose Route Frequency  dronabinol (MARINOL) capsule 5 mg  5 mg Oral ACB&D  
 bumetanide (BUMEX) injection 2 mg  2 mg IntraVENous TID  albumin human 25% (BUMINATE) solution 12.5 g  12.5 g IntraVENous BID  digoxin (LANOXIN) tablet 0.25 mg  0.25 mg Oral DAILY  potassium chloride SR (KLOR-CON 10) tablet 60 mEq  60 mEq Oral TID  cefepime (MAXIPIME) 2 g in 0.9% sodium chloride (MBP/ADV) 100 mL  2 g IntraVENous Q8H  
 0.9% sodium chloride infusion  3 mL/hr IntraVENous CONTINUOUS  
 magnesium oxide (MAG-OX) tablet 400 mg  400 mg Oral BID  
 oxyCODONE IR (ROXICODONE) tablet 5 mg  5 mg Oral Q6H PRN  mirtazapine (REMERON) tablet 15 mg  15 mg Oral QHS  balsam peru-castor oil (VENELEX) ointment   Topical TID  tamsulosin (FLOMAX) capsule 0.4 mg  0.4 mg Oral DAILY  aspirin chewable tablet 81 mg  81 mg Oral DAILY  sildenafil (antihypertensive) (REVATIO) tablet 20 mg  20 mg Oral TID  pantoprazole (PROTONIX) tablet 40 mg  40 mg Oral ACB  insulin lispro (HUMALOG) injection   SubCUTAneous TIDAC  insulin lispro (HUMALOG) injection   SubCUTAneous AC&HS  Warfarin MD/NP dosing   Other PRN  
 epoetin yvonne-epbx (RETACRIT) injection 20,000 Units  20,000 Units SubCUTAneous Q7D  
 0.9% sodium chloride infusion 250 mL  250 mL IntraVENous PRN  
 bivalirudin (ANGIOMAX) 250 mg in 0.9% sodium chloride (MBP/ADV) 50 mL  0.02-2.5 mg/kg/hr IntraVENous TITRATE  lidocaine (LIDODERM) 5 % patch 1 Patch  1 Patch TransDERmal Q24H  
 0.9% sodium chloride infusion  9 mL/hr IntraVENous CONTINUOUS  
 0.45% sodium chloride infusion  10 mL/hr IntraVENous CONTINUOUS  
 sodium chloride (NS) flush 5-40 mL  5-40 mL IntraVENous Q8H  
 sodium chloride (NS) flush 5-40 mL  5-40 mL IntraVENous PRN  
 acetaminophen (TYLENOL) tablet 650 mg  650 mg Oral Q6H PRN  
 naloxone (NARCAN) injection 0.4 mg  0.4 mg IntraVENous PRN  
 ondansetron (ZOFRAN) injection 4 mg  4 mg IntraVENous Q4H PRN  
 albuterol-ipratropium (DUO-NEB) 2.5 MG-0.5 MG/3 ML  3 mL Nebulization Q6H PRN  
 senna-docusate (PERICOLACE) 8.6-50 mg per tablet 1 Tab  1 Tab Oral DAILY  polyethylene glycol (MIRALAX) packet 17 g  17 g Oral DAILY  bisacodyl (DULCOLAX) tablet 5 mg  5 mg Oral DAILY PRN  
 sucralfate (CARAFATE) tablet 1 g  1 g Oral AC&HS  
  0.9% sodium chloride infusion 250 mL  250 mL IntraVENous PRN  
 influenza vaccine 2019-20 (6 mos+)(PF) (FLUARIX/FLULAVAL/FLUZONE QUAD) injection 0.5 mL  0.5 mL IntraMUSCular PRIOR TO DISCHARGE  hydrALAZINE (APRESOLINE) 20 mg/mL injection 20 mg  20 mg IntraVENous Q6H PRN  
 dextrose 10 % infusion 125-250 mL  125-250 mL IntraVENous PRN  
 milrinone (PRIMACOR) 20 MG/100 ML D5W infusion  0.375 mcg/kg/min IntraVENous TITRATE  insulin regular (NOVOLIN R, HUMULIN R) 100 Units in 0.9% sodium chloride 100 mL infusion  1-50 Units/hr IntraVENous TITRATE  ELECTROLYTE REPLACEMENT NOTE: Nurse to review Serum Potassium and Magnesuim levels and Initiate Electrolyte Replacement Protocol as needed  1 Each Other PRN No Known Allergies Objective: 
Vitals:   
Vitals:  
 11/26/19 0700 11/26/19 0737 11/26/19 0800 11/26/19 0830 BP:      
Pulse: (!) 103  (!) 117 (!) 102 Resp: 11 30 17 Temp:      
SpO2: 100%   100% Weight:  89 kg (196 lb 1.6 oz) Height:      
 
Intake and Output: 
11/26 0701 - 11/26 1900 In: -  
Out: 165 [Urine:125; Drains:40] 
11/24 1901 - 11/26 0700 In: 2108 [P.O.:720; I.V.:1388] Out: 6875 [GWNJU:0905; Drains:550] Physical Examination: 
 
General: Awake and alert Neck:  Lines + Resp:  Decreased bibasilar breath sounds CV:  VADsounds  2-3+ b/l LE edema GI:  Soft, NT, + Bowel sounds Neurologic:  AAO X3  
:  No neal [x]    High complexity decision making was performed 
[x]    Patient is at high-risk of decompensation with multiple organ involvement Lab Data Personally Reviewed: I have reviewed all the pertinent labs, microbiology data and radiology studies during assessment. Recent Labs  
  11/26/19 
0436 11/25/19 
0416 11/24/19 
1626 11/24/19 
4357 * 131*  --  131* K 4.4 3.4*  3.2* 3.5 3.3*  
CL 97 93*  --  94* CO2 33* 32  --  31  
* 190*  --  96 BUN 20 20  --  22* CREA 1.10 1.04  --  1.08  
CA 8.6 7.9*  --  8.2* MG 2.1 1.9  1.8  --  1.8 PHOS 3.5 2.8  --   --   
ALB 2.1* 2.0*  --  2.2*  
SGOT 11* 14*  --  16  
ALT <6* <6*  --  <6* INR 2.4* 1.9*  --  1.8* Recent Labs  
  11/26/19 
0436 11/25/19 
0416 11/24/19 
5631 WBC 7.5 6.0 6.2 HGB 9.4* 9.5* 9.5* HCT 30.9* 30.8* 30.0*  
 170 134* No results found for: SDES Lab Results Component Value Date/Time Culture result: NO GROWTH 5 DAYS 11/12/2019 11:18 AM  
 Culture result: SERRATIA MARCESCENS (A) 10/31/2019 10:05 AM  
 Culture result: SERRATIA MARCESCENS (2ND COLONY TYPE/STRAIN) (A) 10/31/2019 10:05 AM  
 Culture result: ENTEROCOCCUS FAECALIS GROUP D (A) 10/31/2019 10:05 AM  
 Culture result: NO GROWTH 5 DAYS 10/25/2019 07:14 PM  
 
Recent Results (from the past 24 hour(s)) GLUCOSE, POC Collection Time: 11/25/19 11:19 AM  
Result Value Ref Range Glucose (POC) 107 (H) 65 - 100 mg/dL Performed by Kaela Ayala URINALYSIS W/MICROSCOPIC Collection Time: 11/25/19  3:05 PM  
Result Value Ref Range Color YELLOW/STRAW Appearance CLEAR CLEAR Specific gravity 1.005 1.003 - 1.030    
 pH (UA) 5.5 5.0 - 8.0 Protein NEGATIVE  NEG mg/dL Glucose NEGATIVE  NEG mg/dL Ketone NEGATIVE  NEG mg/dL Bilirubin NEGATIVE  NEG Blood TRACE (A) NEG Urobilinogen 0.2 0.2 - 1.0 EU/dL Nitrites NEGATIVE  NEG Leukocyte Esterase NEGATIVE  NEG    
 WBC 0-4 0 - 4 /hpf  
 RBC 0-5 0 - 5 /hpf Epithelial cells FEW FEW /lpf Bacteria NEGATIVE  NEG /hpf Hyaline cast 0-2 0 - 5 /lpf URINE CULTURE HOLD SAMPLE Collection Time: 11/25/19  3:05 PM  
Result Value Ref Range Urine culture hold URINE ON HOLD IN MICROBIOLOGY DEPT FOR 3 DAYS. IF UNPRESERVED URINE IS SUBMITTED, IT CANNOT BE USED FOR ADDITIONAL TESTING AFTER 24 HRS, RECOLLECTION WILL BE REQUIRED. LD Collection Time: 11/25/19  3:08 PM  
Result Value Ref Range  (H) 85 - 241 U/L  
IRON PROFILE Collection Time: 11/25/19  3:08 PM  
Result Value Ref Range Iron 25 (L) 35 - 150 ug/dL TIBC 119 (L) 250 - 450 ug/dL Iron % saturation 21 20 - 50 % FERRITIN Collection Time: 11/25/19  3:08 PM  
Result Value Ref Range Ferritin 457 (H) 26 - 388 NG/ML PREALBUMIN Collection Time: 11/25/19  3:08 PM  
Result Value Ref Range Prealbumin 7.6 (L) 20.0 - 40.0 mg/dL URIC ACID Collection Time: 11/25/19  3:08 PM  
Result Value Ref Range Uric acid 3.6 3.5 - 7.2 MG/DL  
PTT Collection Time: 11/25/19  3:08 PM  
Result Value Ref Range aPTT 58.5 (H) 22.1 - 32.0 sec  
 aPTT, therapeutic range     58.0 - 77.0 SECS  
GLUCOSE, POC Collection Time: 11/25/19  5:31 PM  
Result Value Ref Range Glucose (POC) 148 (H) 65 - 100 mg/dL Performed by Kelvin Nunez GLUCOSE, POC Collection Time: 11/25/19  9:47 PM  
Result Value Ref Range Glucose (POC) 105 (H) 65 - 100 mg/dL Performed by Abbie [facility] METABOLIC PANEL, COMPREHENSIVE Collection Time: 11/26/19  4:36 AM  
Result Value Ref Range Sodium 134 (L) 136 - 145 mmol/L Potassium 4.4 3.5 - 5.1 mmol/L Chloride 97 97 - 108 mmol/L  
 CO2 33 (H) 21 - 32 mmol/L Anion gap 4 (L) 5 - 15 mmol/L Glucose 161 (H) 65 - 100 mg/dL BUN 20 6 - 20 MG/DL Creatinine 1.10 0.70 - 1.30 MG/DL  
 BUN/Creatinine ratio 18 12 - 20 GFR est AA >60 >60 ml/min/1.73m2 GFR est non-AA >60 >60 ml/min/1.73m2 Calcium 8.6 8.5 - 10.1 MG/DL Bilirubin, total 1.4 (H) 0.2 - 1.0 MG/DL  
 ALT (SGPT) <6 (L) 12 - 78 U/L  
 AST (SGOT) 11 (L) 15 - 37 U/L Alk. phosphatase 101 45 - 117 U/L Protein, total 5.7 (L) 6.4 - 8.2 g/dL Albumin 2.1 (L) 3.5 - 5.0 g/dL Globulin 3.6 2.0 - 4.0 g/dL A-G Ratio 0.6 (L) 1.1 - 2.2 MAGNESIUM Collection Time: 11/26/19  4:36 AM  
Result Value Ref Range Magnesium 2.1 1.6 - 2.4 mg/dL LACTIC ACID Collection Time: 11/26/19  4:36 AM  
Result Value Ref Range Lactic acid 1.4 0.4 - 2.0 MMOL/L  
CBC W/O DIFF  Collection Time: 11/26/19  4:36 AM  
 Result Value Ref Range WBC 7.5 4.1 - 11.1 K/uL  
 RBC 3.13 (L) 4.10 - 5.70 M/uL HGB 9.4 (L) 12.1 - 17.0 g/dL HCT 30.9 (L) 36.6 - 50.3 % MCV 98.7 80.0 - 99.0 FL  
 MCH 30.0 26.0 - 34.0 PG  
 MCHC 30.4 30.0 - 36.5 g/dL  
 RDW 19.2 (H) 11.5 - 14.5 % PLATELET 897 309 - 001 K/uL MPV 9.8 8.9 - 12.9 FL  
 NRBC 0.0 0  WBC ABSOLUTE NRBC 0.00 0.00 - 0.01 K/uL PROTHROMBIN TIME + INR Collection Time: 11/26/19  4:36 AM  
Result Value Ref Range INR 2.4 (H) 0.9 - 1.1 Prothrombin time 22.8 (H) 9.0 - 11.1 sec PTT Collection Time: 11/26/19  4:36 AM  
Result Value Ref Range aPTT 69.8 (H) 22.1 - 32.0 sec  
 aPTT, therapeutic range     58.0 - 77.0 SECS  
DIGOXIN Collection Time: 11/26/19  4:36 AM  
Result Value Ref Range Digoxin level 0.4 (L) 0.90 - 2.00 NG/ML  
LD Collection Time: 11/26/19  4:36 AM  
Result Value Ref Range  85 - 241 U/L  
NT-PRO BNP Collection Time: 11/26/19  4:36 AM  
Result Value Ref Range NT pro-BNP 6,172 (H) <125 PG/ML  
PHOSPHORUS Collection Time: 11/26/19  4:36 AM  
Result Value Ref Range Phosphorus 3.5 2.6 - 4.7 MG/DL  
GLUCOSE, POC Collection Time: 11/26/19  7:24 AM  
Result Value Ref Range Glucose (POC) 156 (H) 65 - 100 mg/dL Performed by Omayra Mills Total time spent with patient:  xxx   min. Care Plan discussed with: 
Patient Family RN Consulting Physician [id number] [facility],      
 
I have reviewed the flowsheets. Chart and Pertinent Notes have been reviewed. No change in PMH ,family and social history from Consult note.  
 
 
Umang [place] MD

## 2019-11-26 NOTE — OP NOTES
[address]  
OPERATIVE REPORT Name:  Melvin Peñaloza 
MR#:  [id number] :  1950 ACCOUNT #:  [de-identified] DATE OF SERVICE:  2019 PREOPERATIVE DIAGNOSIS:  Thrombosis, left brachial artery. POSTOPERATIVE DIAGNOSIS:  Thrombosis, left brachial artery. PROCEDURE PERFORMED:  Left brachial artery thrombectomy. SURGEON:  Denia Perez MD 
 
ASSISTANT:  Adair Le. ANESTHESIA:  Local with sedation. COMPLICATIONS:  None. SPECIMENS REMOVED:  None. IMPLANTS:  None. ESTIMATED BLOOD LOSS:  Minimal. 
 
INDICATIONS:  The patient is a 75-year-old male who underwent a left ventricular assist device placement a little over a week ago. He has noted left forearm and hand pain and was found on arterial duplex to have thrombosis of his left brachial artery confined to the upper arm without evidence of clot in the forearm. He will undergo brachial embolectomy. PROCEDURE:  The patient's left arm was prepped and draped. A transverse incision was made at the antecubital level. The brachial artery was identified and dissected free. The patient was already on anticoagulation. A transverse incision was made in the brachial artery after clamping it proximally and distally. A #3 Shakila catheter was passed proximally with return of clot and good inflow. The Shakila was passed into the forearm without return of any clot. The arteriotomy was closed with running 6-0 Prolene. Flow was then restored. There was a good pulse. The incision was then closed with Vicryl subcutaneous suture and Vicryl subcuticular skin closure. Dressings were applied and the patient was returned to the recovery room in stable condition. Hernán Ellis MD 
 
 
[id number] 
D:  2019 16:35 
T:  2019 1:00 
JOB #:  [id number]

## 2019-11-26 NOTE — PROGRESS NOTES
12:15 TRANSFER - IN REPORT: 
 
Verbal report received from Vidal Montes De Oca and Vy RN(name) on [employer]  being received from CVICU(unit) for routine progression of care Report consisted of patients Situation, Background, Assessment and  
Recommendations(SBAR). Information from the following report(s) SBAR was reviewed with the receiving nurse. Opportunity for questions and clarification was provided. Assessment completed upon patients arrival to unit and care assumed. CAROL hose ordered for patient but they will not fit his edematous legs. SCD placed for VTE prophylaxis. 15:20 Left message for PICC team to assess PICC site. Area below site has become tender, swollen and dark red. Anival Weston NP was called and she is placing order for ultrasound of site. Running milrinone and angiomax through alternative peripheral sites, PICC infusions stopped. 16:30 Bedside shift change report given to Latasha WILKES (oncoming nurse) by Aliya Acosta RN (offgoing nurse). Report included the following information SBAR.

## 2019-11-26 NOTE — PROGRESS NOTES
NYHA class IV A/C systolic heart failure Low EF (10%) Inotrope dependent Malnutrition  
LVAD Work-up Epistaxis Hematuria Remains on milrinone Less hand and arm pain after thrombectomy Hgb and platelets look good INR therapeutic Creatinine normal 
 
Bilirubin and other LFTs look good LDH and lactic acid look good NT pro-BNP about the same No LVAD alarms CXR - small left effusion; minimal pulmonary edema Visit Vitals BP (!) 74/56 Pulse (!) 105 Temp 97.6 °F (36.4 °C) Resp 20 Ht 6' 2\" (1.88 m) Wt 196 lb 1.6 oz (89 kg) SpO2 99% BMI 25.18 kg/m² LVAD Pump Speed (RPM): 5200 Pump Flow (LPM): 4.4 MAP: 80 
PI (Pulsitility Index): 3.1 Power: 3.7  Test: Yes 
Back Up  at Bedside & Labeled: Yes Power Module Test: Yes Driveline Site Care: No 
Driveline Dressing: Clean, Dry, and Intact Outpatient: No 
MAP in Therapeutic Range (Outpatient): Yes Testing Alarms Reviewed: Yes 
Back up SC speed: 5200 Back up Low Speed Limit: 4800 Emergency Equipment with Patient?: Yes Emergency procedures reviewed?: Yes Drive line site inspected?: Yes(covered by dressing) Drive line intergrity inspected?: Yes Drive line dressing changed?: No 
 
 
Intake/Output Summary (Last 24 hours) at 11/26/2019 1259 Last data filed at 11/26/2019 1250 Gross per 24 hour Intake 2430.27 ml Output 3450 ml Net -1019.73 ml Risk of morbidity and mortality - high Medical decision making - high complexity Total critical care time - 30 minutes (CPT 39146)

## 2019-11-27 NOTE — PROGRESS NOTES
1545: Bedside shift change report given to Latasha WILKES (oncoming nurse) by [address] [place] [address], [address] RN (offgoing nurse). Report included the following information SBAR, Kardex, Procedure Summary, Intake/Output, MAR and Recent Results. Upon resuming care of pt. R upper arm appears swollen 2+ PE and painful to patient. PICC line in place. Left message with PICC team to come and evaluate. Tova Talley NP notified. 1630: Stat CXR confirmed PICC in mid SVC. S/w vascular tech regarding stat duplex order. 1700: R arm swelling decreased. Awaiting duplex. Received call from Tova Talley NP. Verbal orders to stop Bival at midnight and draw INR at 0400. Will communicate to night shift. 1900: Paged vascular lab x3 for stat duplex. No anwser back. Dr. Kimi Sellers aware. 1930: Bedside shift change report given to [address] [place] [address],[address] (oncoming nurse) by Donn Elmore RN (offgoing nurse). Report included the following information SBAR, Kardex, Procedure Summary, Intake/Output, MAR and Recent Results.

## 2019-11-27 NOTE — PROGRESS NOTES
JESÚS Plan: Anticipate discharge in 7+ days Patient POD 9 LVAD; POD 2 Left Brachial Thrombectomy [facility] trained [address] for LVAD care at home. [facility] able to accept patient after discharge. Patient's caregivers are spouse, Ivette Nephew, and son, Deandre Jack. Patient is progressing after LVAD implant. CM will follow up with patient next week.  
 
Asya Sethi, MPH

## 2019-11-27 NOTE — PROGRESS NOTES
[facility] Progress Note DOS:   11/27/2019 NAME:  Zay Wheeler MRN:   [id number] REFERRING PROVIDER:  Dr. Roxie Nguyen PRIMARY CARE PHYSICIAN: Thais Walter MD 
 
 
Chief Complaint:  
Cardiogenic Shock HPI: Jean Paul Kiser is a 71y.o. year old pleasant white male with a history of HTN, HLD, JOSE, CAD s/p cardiac arrest VFib s/p CABG (2011) c/b sternal would infection and sternectomy, ischemic cardiomyopathy LVEF 15-20%, s/p ICD and with LBBB. He was admitted with acute on chronic systolic heart failure with massive volume overload > 20 lbs, in the setting of atrial fibrillation s/p failed DCCV and underwent DCCV and RHC on 6/18.  S/p BiVICD on 6/21/19 with Dr. Nicolas Zazueta. He was discharged home home on IV milrinone on 6/26/19. Fausto Ramos has been followed closely by Dr. Roxie Nguyen and the [facility]. 
  
Mr. Kiser was admitted for acute on chronic systolic heart failure. He underwent implantation of Impella 5.0 due to CS and has completed his LVAD evaluation. He meets criteria for implant of HM3 as DT. He was NYHA Class IV prior to implant of Impella 5.0, has LVEF < 15%, was intolerant of GDMT due to symptomatic hypotension and renal dysfunction. He remains dependent on temporary MCS support. RHC  revealed compensated hemodynamics on Impella. His renal function has improved dramatically with improvement in his hemodynamics. He is not a suitable transplant candidate due to single kidney, sternectomy, debility, and frailty. He was reviewed by [facility] and felt to be a high risk heart transplant candidate due to multiple co-morbidities as well as the afore mentioned conditions. He remained in the CCU and underwent a HM 3 implant as DT on 11/18. 24Hr Events S/p left arm thrombectomy Na+ 134 from 131 Cr stable Adequate diuresis Pain improving Procedure:  Procedure(s): REMOVAL TEMPORARY LEFT VENTRICULAR ASSIST DEVICE, IMPLANTATION OF LEFT VENTRICULAR ASSIST DEVICE PERMANENT (VAD), ECC, JACQUE, EPI AORTIC US BY DR Roseann Albarran. POD:  9 Impression / Plan:  
Heart Failure Status: NYHA Class IV INTERMACS Category 2 Chronic systolic heart failure due to ICM, NYHA Class IV, EF < 15%, cardiogenic shock bridged with Impella 5.0 support to HM 3 implant S/p Impella removal and LVAD implant Continue milrinone 0.375mcg/kg/min for now Cont Sildenafil - will need PA 
 IV Bumex to 2 mg TID  + 25% albumin BID No AA/ARB/ARNI post op- will start as appropriate Strict I/O, daily weights, Na+ restricted diet Continue LVAD education Daily dressing changes Anticoagulation for LVAD, INR goal 2-3 Continue warfarin, ASA 
D/C bivalirudin - INR 2.0 Con coumadin tonight 4 mg 
  
Acute Respiratory Failure post op Resolved Pulmonary hygiene Wean NC for sats > 92% Cont home CPAP - wife bringing back up mask Post op Pain PRN oxycodone Comfort measures L Brachial Artery Thrombosis s/p thrombectomy Surgical management per Dr. Jean Paul Pickett On bivalrudin and warfarin Pain improved Swelling, pain of right arm, acute Doppler studies (-) for thrombus Continue AC Confirmed PICC placement CKD 3, atrophic left kidney Appreciate Nephrology assistance Assess diuretic dosing daily IV bumex Avoid nephrotoxins Trend labs  
  
CAD s/p CABG Cont low dose ASA- watch platelets No BB d/t RV failure Hold statin due to recent hepatic failure [facility] performed 11/13 - low likelihood of benefit from revascularization  
  
Hx of VF arrest s/p BiV-ICD No recent shocks Keep K > 4 and Mg > 2  
   
PAF Currently in ST, Paced Cont PO Amio Digoxin to 25 mcg daily Check EKG today to assess rhythm and QTc Hypokalemia Klor Con to 60 mEq TID PO Hx of gout Uric acid WNL Suspected aspiration pneumonia RUL consolidation Suspect aspiration related to over sedation Limit sedatives Sputum culture Cefepime 2g q8h x 7 days total 
  
 Urinary retention, c/b serratia UTI and hematuria Appreciate Urology consult Cystoscopy performed 11/13 shows catheter induced cystitis Repeat UA shows resolution of UTI  
  
Malnutrition Appreciate Nutritionist consult Prealbumin down to 7.6 Continue Marinol 5 mg PO BID 
6 small meals daily Continue mirtazapine - watch Na+ 
  
COPD Appreciate Pulmonology assistance Continue nebs PRN   
PFTs complete 
  
Anemia Multifactorial, due to hemolysis + epistaxis + hematuria Intolerant of octreotide or doxycycline for AVM ppx due to prolonged QTc Transfuse for Hgb goal > 8 
  
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  
  
Hx of sternal wound infection, sternectomy Sternum closed post op- monitor  
  
Vocal cord paralysis Improved Speech therapy following May need MBS Debility Continue PT/OT  
  
Ppx Protonix PT/OT Will continue cefepime for 2 weeks post op per Dr. Rin Fraga for Impella prophylaxis Bowel regimen Cont Mechanical soft PICC placed Dispo: 
Transfer to [facility] History: 
Past Medical History:  
Diagnosis Date  Degenerative disc disease, lumbar  Heart failure ([facility] [address]  High cholesterol  Hypertension  Paroxysmal atrial fibrillation ([facility] [address] 4/2/2019  Spinal stenosis Past Surgical History:  
Procedure Laterality Date  COLONOSCOPY Left 11/11/2019 COLONOSCOPY at bedside performed by Bryon Perez MD at [address]  HX CORONARY ARTERY BYPASS GRAFT    
 triple  HX HERNIA REPAIR    
 HX IMPLANTABLE CARDIOVERTER DEFIBRILLATOR  [place] CARDIOVERSION ELECTIVE ARRHYTHMIA EXTERNAL N/A 6/10/2019 EP CARDIOVERSION performed by Caridad Moralez MD at [address] CATH LAB  [place] CARDIOVERSION ELECTIVE ARRHYTHMIA EXTERNAL N/A 6/18/2019 EP CARDIOVERSION performed by Zeke Maldonado MD at [address] CATH LAB  KS INSJ/RPLCMT PERM DFB W/TRNSVNS LDS 1/DUAL CHMBR N/A 6/21/2019 INSERT ICD BIV MULTI performed by Jose Myles MD at [address]  CATH LAB  OR TCAT IMPL WRLS P-ART PRS SNR L-T HEMODYN MNTR N/A 2019 IMPLANT HEART FAILURE MONITORING DEVICE performed by Laurel Brown MD at [address]  CATH LAB Social History Socioeconomic History  Marital status:  Spouse name: Not on file  Number of children: Not on file  Years of education: Not on file  Highest education level: Not on file Occupational History  Not on file Social Needs  Financial resource strain: Not on file  Food insecurity:  
  Worry: Not on file Inability: Not on file  Transportation needs:  
  Medical: Not on file Non-medical: Not on file Tobacco Use  Smoking status: Former Smoker Last attempt to quit: 2010 Years since quittin.9  Smokeless tobacco: Never Used Substance and Sexual Activity  Alcohol use: Not Currently Comment: rarely  Drug use: Never  Sexual activity: Not on file Lifestyle  Physical activity:  
  Days per week: Not on file Minutes per session: Not on file  Stress: Not on file Relationships  Social connections:  
  Talks on phone: Not on file Gets together: Not on file Attends Bahai service: Not on file Active member of club or organization: Not on file Attends meetings of clubs or organizations: Not on file Relationship status: Not on file  Intimate partner violence:  
  Fear of current or ex partner: Not on file Emotionally abused: Not on file Physically abused: Not on file Forced sexual activity: Not on file Other Topics Concern [address] Service Not Asked  Blood Transfusions Not Asked  Caffeine Concern Not Asked  Occupational Exposure Not Asked [employer] Hazards Not Asked  Sleep Concern Not Asked  Stress Concern Not Asked  Weight Concern Not Asked  Special Diet Not Asked  Back Care Not Asked  Exercise Not Asked  Bike Helmet Not Asked  Seat Belt Not Asked  Self-Exams Not Asked Social History Narrative  Not on file Family History Problem Relation Age of Onset  Lung Disease Mother  Hypertension Mother [facility] [address] Arthritis-osteo Mother  Heart Disease Mother  Heart Disease Father  Diabetes Father Current Medications:  
Current Facility-Administered Medications Medication Dose Route Frequency Provider Last Rate Last Dose  warfarin (COUMADIN) tablet 4 mg  4 mg Oral ONCE PollJessica capone, NP      
 dronabinol (MARINOL) capsule 5 mg  5 mg Oral ACB&D Jessica Herrera, NP   5 mg at 11/27/19 1659  bumetanide (BUMEX) injection 2 mg  2 mg IntraVENous TID Jessica Herrera, NP   2 mg at 11/27/19 1658  albumin human 25% (BUMINATE) solution 12.5 g  12.5 g IntraVENous BID Vale Herrera T, NP   12.5 g at 11/27/19 1700  digoxin (LANOXIN) tablet 0.25 mg  0.25 mg Oral DAILY Vale Herrera T, NP   0.25 mg at 11/27/19 0926  
 potassium chloride SR (KLOR-CON 10) tablet 60 mEq  60 mEq Oral TID Aarti Miracle, NP   60 mEq at 11/27/19 1700  cefepime (MAXIPIME) 2 g in 0.9% sodium chloride (MBP/ADV) 100 mL  2 g IntraVENous Q8H Vale Herrera,  mL/hr at 11/27/19 1307 2 g at 11/27/19 1307  
 0.9% sodium chloride infusion  3 mL/hr IntraVENous CONTINUOUS Sherice QUEZADA MD 3 mL/hr at 11/26/19 0847 3 mL/hr at 11/26/19 0847  
 magnesium oxide (MAG-OX) tablet 400 mg  400 mg Oral BID Eudelia Margaret, NP   400 mg at 11/27/19 1700  
 oxyCODONE IR (ROXICODONE) tablet 5 mg  5 mg Oral Q6H PRN Mike Vale MD      
 mirtazapine (REMERON) tablet 15 mg  15 mg Oral QHS Eudelia Margaret, NP   15 mg at 11/26/19 2140  balsam peru-castor oil (VENELEX) ointment   Topical TID MD [facility]) capsule 0.4 mg  0.4 mg Oral DAILY Logan Kincaid MD   0.4 mg at 11/27/19 3545  aspirin chewable tablet 81 mg  81 mg Oral DAILY LeviAlexiin B, NP   81 mg at 11/27/19 2494  
 sildenafil (antihypertensive) (REVATIO) tablet 20 mg  20 mg Oral TID Leena Gamma B, NP   20 mg at 11/27/19 1700  pantoprazole (PROTONIX) tablet 40 mg  40 mg Oral ACB Levi Shana B, NP   40 mg at 11/27/19 8692  insulin lispro (HUMALOG) injection   SubCUTAneous TIDAC Levi Shana B, NP   Stopped at 11/21/19 1630  
 insulin lispro (HUMALOG) injection   SubCUTAneous AC&HS Leena Gamma B, NP   Stopped at 11/27/19 1630  Warfarin MD/NP dosing   Other PRN Clem Altman MD      
 epoetin yvonne-epbx (RETACRIT) injection 20,000 Units  20,000 Units SubCUTAneous Q7D Logan Mann MD   20,000 Units at 11/26/19 0721  
 0.9% sodium chloride infusion 250 mL  250 mL IntraVENous PRN Leopoldo Parish M, MD      
 lidocaine (LIDODERM) 5 % patch 1 Patch  1 Patch TransDERmal Q24H Leena Gamma B, NP   1 Patch at 11/26/19 1829  
 0.9% sodium chloride infusion  9 mL/hr IntraVENous CONTINUOUS Larissa Elizabeth MD   Stopped at 11/22/19 1400  
 0.45% sodium chloride infusion  10 mL/hr IntraVENous CONTINUOUS Larissa Elizabeth MD   Stopped at 11/22/19 1736  sodium chloride (NS) flush 5-40 mL  5-40 mL IntraVENous Q8H Larissa Elizabeth MD   10 mL at 11/27/19 1657  sodium chloride (NS) flush 5-40 mL  5-40 mL IntraVENous PRN Larissa Elizabeth MD      
 acetaminophen (TYLENOL) tablet 650 mg  650 mg Oral Q6H PRN Larissa Elizabeth MD   650 mg at 11/27/19 0445  
 naloxone (NARCAN) injection 0.4 mg  0.4 mg IntraVENous PRN Larissa Elizabeth MD      
 ondansetron [facility]) injection 4 mg  4 mg IntraVENous Q4H PRN Larissa Elizabeth MD   4 mg at 11/20/19 2000  
 albuterol-ipratropium (DUO-NEB) 2.5 MG-0.5 MG/3 ML  3 mL Nebulization Q6H PRN Larissa Elizabeth MD      
 senna-docusate (PERICOLACE) 8.6-50 mg per tablet 1 Tab  1 Tab Oral DAILY Larissa Elizabeth MD   1 Tab at 11/27/19 4149  polyethylene glycol (MIRALAX) packet 17 g  17 g Oral DAILY Era Martinez MD   Stopped at 11/26/19 0900  
 bisacodyl (DULCOLAX) tablet 5 mg  5 mg Oral DAILY PRN Era Martinez MD      
 sucralfate (CARAFATE) tablet 1 g  1 g Oral AC&HS Era Martinez MD   1 g at 11/27/19 1700  
 0.9% sodium chloride infusion 250 mL  250 mL IntraVENous PRN Alecia Cardozo MD      
 influenza vaccine 2019-20 (6 mos+)(PF) (FLUARIX/FLULAVAL/FLUZONE QUAD) injection 0.5 mL  0.5 mL IntraMUSCular PRIOR TO DISCHARGE Sandy Altman MD      
 hydrALAZINE (APRESOLINE) 20 mg/mL injection 20 mg  20 mg IntraVENous Q6H PRN Shana Sims NP   20 mg at 11/19/19 0547  
 dextrose 10 % infusion 125-250 mL  125-250 mL IntraVENous PRN Alicia Triana MD   Stopped at 11/18/19 0700  
 milrinone (PRIMACOR) 20 MG/100 ML D5W infusion  0.375 mcg/kg/min IntraVENous TITRATE Chris QUEZADA MD 10 mL/hr at 11/27/19 1655 0.375 mcg/kg/min at 11/27/19 1655  insulin regular (NOVOLIN R, HUMULIN R) 100 Units in 0.9% sodium chloride 100 mL infusion  1-50 Units/hr IntraVENous TITRATE Viviana Herrera NP   Stopped at 11/21/19 5090  ELECTROLYTE REPLACEMENT NOTE: Nurse to review Serum Potassium and Magnesuim levels and Initiate Electrolyte Replacement Protocol as needed  1 Each Other PRN Kelsey Garcia NP Allergies: No Known Allergies ROS:   
Review of Systems Constitutional: Negative for chills, fever and malaise/fatigue. HENT: Positive for hearing loss. Respiratory: Negative for cough, sputum production and shortness of breath. Cardiovascular: Positive for leg swelling. Negative for chest pain. Gastrointestinal: Negative for heartburn, nausea and vomiting. Musculoskeletal: Negative for myalgias. Skin: Negative. Neurological: Positive for weakness. Negative for dizziness and headaches. Endo/Heme/Allergies: Negative. Psychiatric/Behavioral: Positive for depression. Physical Exam: Physical Exam 
Vitals signs and nursing note reviewed. Constitutional:   
   Appearance: He is well-developed and well-nourished. HENT:  
   Head: Normocephalic. Eyes:  
   Pupils: Pupils are equal, round, and reactive to light. Neck: Musculoskeletal: Normal range of motion and neck supple. Vascular: No JVD. Cardiovascular:  
   Rate and Rhythm: Normal rate and regular rhythm. Heart sounds: Normal heart sounds. Comments: + VAD hum Pulmonary:  
   Effort: Pulmonary effort is normal.  
   Breath sounds: Normal breath sounds. Abdominal:  
   General: Bowel sounds are normal. There is no distension. Palpations: Abdomen is soft. Musculoskeletal: Normal range of motion. General: Edema present. Comments: bilaterally Skin: 
   General: Skin is warm and dry. Neurological:  
   Mental Status: He is alert and oriented to person, place, and time. Vitals:  
Visit Vitals BP (!) 74/56 Pulse (!) 102 Temp 98 °F (36.7 °C) Resp 20 Ht 6' 2\" (1.88 m) Wt 196 lb 10.4 oz (89.2 kg) SpO2 97% BMI 25.25 kg/m² Temp (24hrs), Av.2 °F (36.8 °C), Min:97.9 °F (36.6 °C), Max:98.7 °F (37.1 °C) Hemodynamics: 
 CVP: CVP (mmHg): 7 mmHg (19 0930) Admission Weight: Last Weight Weight: 192 lb 10.9 oz (87.4 kg) Weight: 196 lb 10.4 oz (89.2 kg) Intake / Output / Drain: 
Last 24 hrs.:  
 
Intake/Output Summary (Last 24 hours) at 2019 1720 Last data filed at 2019 1528 Gross per 24 hour Intake 720 ml Output 2030 ml Net -1310 ml  
 
 
Drains:  
24h: 380 8h: 180 Extubation Date / Time:  19 at 1030am 
 
Oxygen Therapy: 
Oxygen Therapy O2 Sat (%): 97 % (19 1510) Pulse via Oximetry: 104 beats per minute (19 1400) O2 Device: (P) Nasal cannula (19 0901) O2 Flow Rate (L/min): 4 l/min (19 1510) FIO2 (%): 50 % (19 1035) VAD Data: LVAD (Heartmate) Pump Speed (RPM): 2145 Pump Flow (LPM): 4.5 PI (Pulsitility Index): 3.4 Power: 3.8 MAP: 78  Test: No 
Back Up  at Bedside & Labeled: Yes Power Module Test: No 
Driveline Site Care: No 
Driveline Dressing: Clean, Dry, and Intact Drive Line Exam: 
Stabilization device intact: yes Line inspected for damage: yes Appearance: no edema, erythema, drainage Stabilization Method: anchor to abd Recent Labs:  
Labs Latest Ref Rng & Units 11/27/2019 11/26/2019 11/25/2019 11/25/2019 11/25/2019 11/24/2019 11/24/2019 WBC 4.1 - 11.1 K/uL 7.0 7.5 - - 6.0 - 6.2 RBC 4.10 - 5.70 M/uL 2.91(L) 3.13(L) - - 3.15(L) - 3.12(L) Hemoglobin 12.1 - 17.0 g/dL 8.9(L) 9.4(L) - - 9. 5(L) - 9. 5(L) Hematocrit 36.6 - 50.3 % 28. 6(L) 30. 9(L) - - 30. 8(L) - 30. 0(L) MCV 80.0 - 99.0 FL 98.3 98.7 - - 97.8 - 96.2 Platelets 310 - 677 K/uL 222 194 - - 170 - 134(L) Lymphocytes 12 - 49 % - - - - - - - Monocytes 5 - 13 % - - - - - - - Eosinophils 0 - 7 % - - - - - - - Basophils 0 - 1 % - - - - - - - Albumin 3.5 - 5.0 g/dL 2. 2(L) 2. 1(L) - - 2. 0(L) - 2. 2(L) Calcium 8.5 - 10.1 MG/DL 8. 3(L) 8.6 - - 7. 9(L) - 8. 2(L) SGOT 15 - 37 U/L 10(L) 11(L) - - 14(L) - 16 Glucose 65 - 100 mg/dL 95 161(H) - - 190(H) - 96 BUN 6 - 20 MG/DL 20 20 - - 20 - 22(H) Creatinine 0.70 - 1.30 MG/DL 1.08 1.10 - - 1.04 - 1.08 Sodium 136 - 145 mmol/L 134(L) 134(L) - - 131(L) - 131(L) Potassium 3.5 - 5.1 mmol/L 4.1 4.4 - 3. 4(L) 3. 2(L) 3.5 3. 3(L) TSH 0.36 - 3.74 uIU/mL - - - - - - -  
PSA 0.01 - 4.0 ng/mL - - - - - - -  
LDH 85 - 241 U/L 230 235 258(H) - - - 310(H) CEA ng/mL - - - - - - - Some recent data might be hidden EKG:  
EKG Results Procedure 720 Value Units Date/Time EKG, 12 LEAD, INITIAL [[id number]] Collected:  11/26/19 1435 Order Status:  Completed Updated:  11/26/19 5494 Ventricular Rate 101 BPM   
  Atrial Rate 26 BPM   
  QRS Duration 134 ms Q-T Interval 448 ms QTC Calculation (Bezet) 580 ms Calculated R Axis -19 degrees Calculated T Axis 2 degrees Diagnosis -- Electronic ventricular pacemaker When compared with ECG of 19-NOV-2019 04:17, 
Vent. rate has increased BY  25 BPM 
Confirmed by Vikki Donis MD, Navdeep Potter ([id number]) on 11/26/2019 6:53:54 PM 
  
 EKG, 12 LEAD, INITIAL [[id number]] Order Status:  Completed EKG, 12 LEAD, INITIAL [[id number]] Collected:  11/19/19 0417 Order Status:  Completed Updated:  11/19/19 1224 Ventricular Rate 76 BPM   
  Atrial Rate 0 BPM   
  QRS Duration 156 ms   
  Q-T Interval 564 ms QTC Calculation (Bezet) 634 ms Calculated R Axis 81 degrees Calculated T Axis 120 degrees Diagnosis -- Electronic ventricular pacemaker Baseline artifact When compared with ECG of 13-NOV-2019 04:40, 
Vent. rate has increased BY   2 BPM 
Confirmed by Cathie Gan M.D., Terri Spurling ([id number]) on 11/19/2019 12:23:52 PM 
  
 EKG, 12 LEAD, INITIAL [[id number]] Order Status:  Canceled EKG, 12 LEAD, INITIAL [[id number]] Order Status:  Canceled EKG, 12 LEAD, INITIAL [[id number]] Order Status:  Canceled EKG, 12 LEAD, INITIAL [[id number]] Order Status:  Canceled EKG, 12 LEAD, INITIAL [[id number]] Order Status:  Canceled EKG, 12 LEAD, INITIAL [[id number]] Collected:  11/13/19 0440 Order Status:  Completed Updated:  11/13/19 2200 Ventricular Rate 74 BPM   
  Atrial Rate 66 BPM   
  QRS Duration 166 ms   
  Q-T Interval 488 ms QTC Calculation (Bezet) 541 ms Calculated R Axis -15 degrees Calculated T Axis 157 degrees Diagnosis -- Electronic ventricular pacemaker When compared with ECG of 11-NOV-2019 04:49, No significant change was found Confirmed by Mike Berkowitz M.D., Candice Arteaga ([id number]) on 11/13/2019 10:00:38 PM 
  
 EKG, 12 LEAD, INITIAL [[id number]] Order Status:  Canceled EKG, 12 LEAD, INITIAL [[id number]] Collected:  11/11/19 0449 Order Status:  Completed Updated:  11/11/19 3394 Ventricular Rate 74 BPM   
  Atrial Rate 39 BPM   
  QRS Duration 158 ms Q-T Interval 504 ms QTC Calculation (Bezet) 559 ms Calculated R Axis 13 degrees Calculated T Axis 175 degrees Diagnosis -- Electronic ventricular pacemaker When compared with ECG of 05-NOV-2019 10:44, 
Vent. rate has decreased BY   9 BPM 
Confirmed by Era Her M.D., Tristian Lucas ([id number]) on 11/11/2019 8:54:27 AM 
  
 EKG, 12 LEAD, INITIAL [[id number]] Order Status:  Canceled EKG, 12 LEAD, INITIAL [[id number]] Order Status:  Canceled EKG, 12 LEAD, INITIAL [[id number]] Order Status:  Canceled EKG, 12 LEAD, INITIAL [[id number]] Collected:  11/05/19 1044 Order Status:  Completed Updated:  11/05/19 1226 Ventricular Rate 83 BPM   
  Atrial Rate 83 BPM   
  P-R Interval 80 ms QRS Duration 162 ms Q-T Interval 502 ms QTC Calculation (Bezet) 589 ms Calculated R Axis 2 degrees Calculated T Axis -156 degrees Diagnosis -- Electronic ventricular pacemaker Marked T wave abnormality, consider  
anterolateral ischemia When compared with ECG of 02-NOV-2019 10:42, No significant change was found Confirmed by Akosua Lilly M.D., Tory Zavala ([id number]) on 11/5/2019 12:25:52 PM 
  
 EKG, 12 LEAD, INITIAL [[id number]] Collected:  11/02/19 1042 Order Status:  Completed Updated:  11/03/19 2702 Ventricular Rate 91 BPM   
  Atrial Rate 91 BPM   
  P-R Interval 108 ms QRS Duration 148 ms Q-T Interval 524 ms QTC Calculation (Bezet) 644 ms Calculated R Axis 5 degrees Calculated T Axis -152 degrees Diagnosis --  
  Sinus rhythm with short [place] with occasional premature ventricular complexes  
and fusion complexes Nonspecific intraventricular block Marked T wave abnormality, consider anterolateral ischemia When compared with ECG of 26-OCT-2019 06:55, Sinus rhythm has replaced Electronic ventricular pacemaker Prolonged QT 
 Confirmed by Solitario Pulliam ([id number]) on 11/3/2019 6:35:23 AM 
  
 EKG, 12 LEAD, INITIAL [[id number]] Order Status:  Canceled EKG, 12 LEAD, INITIAL [[id number]] Collected:  10/26/19 4449 Order Status:  Completed Updated:  10/26/19 1807 Ventricular Rate 80 BPM   
  Atrial Rate 76 BPM   
  QRS Duration 174 ms Q-T Interval 462 ms QTC Calculation (Bezet) 532 ms Calculated R Axis 28 degrees Calculated T Axis 150 degrees Diagnosis -- Atrial flutter Left bundle branch block When compared with ECG of 25-OCT-2019 18:20, 
Electronic demand pacing is not noted Confirmed by Tunde Leal ([id number]) on 10/26/2019 6:07:36 PM 
  
 EKG, 12 LEAD, INITIAL [[id number]] Collected:  10/25/19 1820 Order Status:  Completed Updated:  10/26/19 1749 Ventricular Rate 72 BPM   
  Atrial Rate 72 BPM   
  P-R Interval 86 ms QRS Duration 116 ms   
  Q-T Interval 506 ms QTC Calculation (Bezet) 554 ms Calculated P Axis 9 degrees Calculated R Axis -68 degrees Calculated T Axis 10 degrees Diagnosis --  
  undetermied rhythm possible atrial flutter Nonspecific intraventricular conduction delay Ventricular paced rhythm When compared with ECG of 26-JUN-2019 11:39, 
Significant changes have occurred Confirmed by Tunde Leal ([id number]) on 10/26/2019 5:49:02 PM 
  
  
No specialty comments available. 10/25/19 OR ECHO JACQUE INTRAOP  11/18/2019 Narrative See Anesthesia Procedure note for procedure details. Signed by:   
  
Echo Results  (Last 48 hours) None CXR Results  (Last 48 hours)  
          
 11/27/19 0518  XR CHEST PORT Final result Impression:  IMPRESSION: No pneumothorax. No significant change in small bibasilar  
effusion/atelectasis left greater than right. Narrative:  EXAM:  XR CHEST PORT INDICATION:  post LVAD implant COMPARISON:  11/26/2019 FINDINGS: A portable AP radiograph of the chest was obtained at 414 hours.  LVAD  
 device, right PICC catheter, AICD and mediastinal drain/bilateral chest tubes  
are unchanged. .  There is no pneumothorax. There is slight bibasilar  
atelectasis/effusions left greater than right. Interstitial prominence is  
stable. .  Cardiomegaly is stable. .   
   
  
 11/26/19 1708  XR CHEST PORT Final result Impression:  IMPRESSION:   
1. Right upper extremity PICC with tip terminating in the mid SVC. No  
pneumothorax. 2. Unchanged pulmonary interstitial edema, small bilateral pleural effusions and  
bibasilar atelectasis. Unchanged cardiomegaly. Narrative:  EXAM:  XR CHEST PORT INDICATION:   assess PICC placement COMPARISON: Chest radiograph 11/26/2019. FINDINGS: AP radiograph of the chest was obtained. Right upper extremity PICC with tip terminating in the mid SVC. No pneumothorax. LVAD is again noted, as well as a left chest cardiac device. Stable mediastinal  
drains and bilateral chest tubes. No pneumothorax. Unchanged small bilateral  
pleural effusions and bibasilar atelectasis. Unchanged pulmonary interstitial  
edema and cardiomegaly. 11/26/19 0502  XR CHEST PORT Final result Impression:  IMPRESSION: No significant change except for minimal improvement in interstitial  
edema. Narrative:  EXAM:  XR CHEST PORT. INDICATION: Postop LVAD implant. COMPARISON: 11/25/2019. FINDINGS:   
A portable AP radiograph of the chest was obtained at 0405 hours. There are  
sternal sutures and mediastinal clips, a pacemaker in the left chest and a left  
ventricular assist device in place, all unchanged in position. Lines and tubes: The patient is on a cardiac monitor and nasal oxygen. The  
right arm PICC line is unchanged. The mediastinal drain and bilateral chest  
tubes are unchanged in position. Lungs: The interstitial edema throughout the lungs is minimally improved and  
there continues to be atelectasis at the costophrenic angles. Pleura: There are small pleural effusions. Mediastinum: The cardiac and mediastinal contours and pulmonary vascularity are  
normal.  
Bones and soft tissues: There surgical clips and skin staples in the right  
infraclavicular region. TIERRA Watters [address] 9 [address], [address], [address] [place] [address] Office [phone] Fax [phone] 
24 hour VAD/HF Pager: [phone] AHF ATTENDING ADDENDUM Patient was seen and examined in person. Data and notes were reviewed. I have discussed and agree with the plan as noted in the NP note above without further additions. Isaiah Longoria MD PhD 
Calos Rueda [phone] [facility]

## 2019-11-27 NOTE — PROGRESS NOTES
Patient had an uneventful shift. He did not sleep well and was very difficult to arouse when attempted to get to chair. Bath and transition to chair deferred. Cardiac Surgery Shift Summary: 
 
1. I/O's: Intake:  
Meals: 25-50% of each meal (fair) Output: Lizama catheter in place Has patient had BM since surgery? Yes 
  
2. Oxygen Requirements: Patient on 4 L O2 (stable) Increased from 2L 3. Daily Weights (weight change in 24 hours): No significant change in weight (0 - 2 lbs.) 4. Medication Administration: No variations to medication administration 0730: Bedside and Verbal shift change report given to Yolanda Brasher (oncoming nurse) by Pete Parada (offgoing nurse). Report included the following information SBAR, Kardex, OR Summary, Intake/Output, MAR, Recent Results and Cardiac Rhythm SR/ST.

## 2019-11-27 NOTE — PROGRESS NOTES
[facility] 
 [address] [facility], [facility] [address] [facility] Phone: [phone]   [phone]   
  
Nephrology Progress Note Sona Kiser     1950     [id number] Date of Admission : 10/25/2019 
11/27/19 CC:  Follow up for JUAN J, CKD, Hyponatremia Assessment and Plan JUAN J on CKD 
- 2/2 CRS and urinary retention - Cr stable 
- cont current diuretics 
- daily labs for now Hyponatremia: 
- stable 
- from CHF 
- cont diuresis 
  
Gross hematuria, BPH w/ retention: 
- off CBI pre VAD. cysto pre op showed catheter related Cystitis @ postr wall  
- neal had to be replaced yesterday due to urinary retention 
- keep neal in for now 
- on flomax Acute on Chronic HFrEF  
- EF 16-20%, NYHA Class IV , hx of VF arrest - s/p AICD 
- Impella insertion 10/29- removed 11/18  
- s/p LVAD placement on 11/18 CKD Stage III  
- Mild Left renal atrophy at baseline Hoarseness, vocal cord paralysis, mediastinal adenopathy: 
- will need eventual PET/CT 
  
L arm clot s/p thrombectomy on 11/25 JOSE on CPAP  
  
PAF s/p DCCV 6/19 
  
Anemia: 
- from blood loss s/p multiple blood products - s/p IV iron,  Repeat iron studies ok 
- on PAVEL q7 d Serratia and Enteroccocus UTI: 
- completed abx Interval History:   
Seen and examined. Feeling better. Cr and Na stable. Voiding well. Neal in place. No cp or sob reported. Review of Systems: Pertinent items are noted in HPI. Current Medications:  
Current Facility-Administered Medications Medication Dose Route Frequency  dronabinol (MARINOL) capsule 5 mg  5 mg Oral ACB&D  
 bumetanide (BUMEX) injection 2 mg  2 mg IntraVENous TID  albumin human 25% (BUMINATE) solution 12.5 g  12.5 g IntraVENous BID  digoxin (LANOXIN) tablet 0.25 mg  0.25 mg Oral DAILY  potassium chloride SR (KLOR-CON 10) tablet 60 mEq  60 mEq Oral TID  cefepime (MAXIPIME) 2 g in 0.9% sodium chloride (MBP/ADV) 100 mL  2 g IntraVENous Q8H  
  0.9% sodium chloride infusion  3 mL/hr IntraVENous CONTINUOUS  
 magnesium oxide (MAG-OX) tablet 400 mg  400 mg Oral BID  
 oxyCODONE IR (ROXICODONE) tablet 5 mg  5 mg Oral Q6H PRN  mirtazapine (REMERON) tablet 15 mg  15 mg Oral QHS  balsam peru-castor oil (VENELEX) ointment   Topical TID  tamsulosin (FLOMAX) capsule 0.4 mg  0.4 mg Oral DAILY  aspirin chewable tablet 81 mg  81 mg Oral DAILY  sildenafil (antihypertensive) (REVATIO) tablet 20 mg  20 mg Oral TID  pantoprazole (PROTONIX) tablet 40 mg  40 mg Oral ACB  insulin lispro (HUMALOG) injection   SubCUTAneous TIDAC  insulin lispro (HUMALOG) injection   SubCUTAneous AC&HS  Warfarin MD/NP dosing   Other PRN  
 epoetin yvonne-epbx (RETACRIT) injection 20,000 Units  20,000 Units SubCUTAneous Q7D  
 0.9% sodium chloride infusion 250 mL  250 mL IntraVENous PRN  
 lidocaine (LIDODERM) 5 % patch 1 Patch  1 Patch TransDERmal Q24H  
 0.9% sodium chloride infusion  9 mL/hr IntraVENous CONTINUOUS  
 0.45% sodium chloride infusion  10 mL/hr IntraVENous CONTINUOUS  
 sodium chloride (NS) flush 5-40 mL  5-40 mL IntraVENous Q8H  
 sodium chloride (NS) flush 5-40 mL  5-40 mL IntraVENous PRN  
 acetaminophen (TYLENOL) tablet 650 mg  650 mg Oral Q6H PRN  
 naloxone (NARCAN) injection 0.4 mg  0.4 mg IntraVENous PRN  
 ondansetron (ZOFRAN) injection 4 mg  4 mg IntraVENous Q4H PRN  
 albuterol-ipratropium (DUO-NEB) 2.5 MG-0.5 MG/3 ML  3 mL Nebulization Q6H PRN  
 senna-docusate (PERICOLACE) 8.6-50 mg per tablet 1 Tab  1 Tab Oral DAILY  polyethylene glycol (MIRALAX) packet 17 g  17 g Oral DAILY  bisacodyl (DULCOLAX) tablet 5 mg  5 mg Oral DAILY PRN  
 sucralfate (CARAFATE) tablet 1 g  1 g Oral AC&HS  
 0.9% sodium chloride infusion 250 mL  250 mL IntraVENous PRN  
 influenza vaccine 2019-20 (6 mos+)(PF) (FLUARIX/FLULAVAL/FLUZONE QUAD) injection 0.5 mL  0.5 mL IntraMUSCular PRIOR TO DISCHARGE  
  hydrALAZINE (APRESOLINE) 20 mg/mL injection 20 mg  20 mg IntraVENous Q6H PRN  
 dextrose 10 % infusion 125-250 mL  125-250 mL IntraVENous PRN  
 milrinone (PRIMACOR) 20 MG/100 ML D5W infusion  0.375 mcg/kg/min IntraVENous TITRATE  insulin regular (NOVOLIN R, HUMULIN R) 100 Units in 0.9% sodium chloride 100 mL infusion  1-50 Units/hr IntraVENous TITRATE  ELECTROLYTE REPLACEMENT NOTE: Nurse to review Serum Potassium and Magnesuim levels and Initiate Electrolyte Replacement Protocol as needed  1 Each Other PRN No Known Allergies Objective: 
Vitals:   
Vitals:  
 11/27/19 0015 11/27/19 0437 11/27/19 0744 11/27/19 1109 BP:      
Pulse: (!) 103 95 86 (!) 106 Resp: 20 20 22 22 Temp: 97.9 °F (36.6 °C) 98 °F (36.7 °C) 97.9 °F (36.6 °C) 98.7 °F (37.1 °C) SpO2: 94% 100% 96% 96% Weight:  89.2 kg (196 lb 10.4 oz) Height:      
 
Intake and Output: 
No intake/output data recorded. 11/25 1901 - 11/27 0700 In: 3157.4 [P.O.:2411; I.V.:746.4] Out: 2414 [OAAGT:1849; LWWKMF:879] Physical Examination: 
 
General: Awake and alert Neck:  Lines + Resp:  Decreased bibasilar breath sounds CV:  VADsounds  2-3+ b/l LE edema GI:  Soft, NT, + Bowel sounds Neurologic:  AAO X3  
:  No neal [x]    High complexity decision making was performed 
[x]    Patient is at high-risk of decompensation with multiple organ involvement Lab Data Personally Reviewed: I have reviewed all the pertinent labs, microbiology data and radiology studies during assessment. Recent Labs  
  11/27/19 0418 11/26/19 
1313 11/26/19 
0436 11/25/19 
0416 11/24/19 
1626 *  --  134* 131*  --   
K 4.1  --  4.4 3.4*  3.2* 3.5 CL 96*  --  97 93*  --   
CO2 32  --  33* 32  --   
GLU 95  --  161* 190*  --   
BUN 20  --  20 20  --   
CREA 1.08  --  1.10 1.04  --   
CA 8.3*  --  8.6 7.9*  --   
MG 2.1  --  2.1 1.9  1.8  --   
PHOS 3.3  --  3.5 2.8  --   
ALB 2.2*  --  2.1* 2.0*  --   
SGOT 10*  --  11* 14*  --   
 ALT <6*  --  <6* <6*  --   
INR 2.0* 3.2* 2.4* 1.9*  --   
 
Recent Labs  
  11/27/19 
0418 11/26/19 
0436 11/25/19 
0416 WBC 7.0 7.5 6.0 HGB 8.9* 9.4* 9.5* HCT 28.6* 30.9* 30.8*  194 170 No results found for: SDES Lab Results Component Value Date/Time Culture result: NO GROWTH 5 DAYS 11/12/2019 11:18 AM  
 Culture result: SERRATIA MARCESCENS (A) 10/31/2019 10:05 AM  
 Culture result: SERRATIA MARCESCENS (2ND COLONY TYPE/STRAIN) (A) 10/31/2019 10:05 AM  
 Culture result: ENTEROCOCCUS FAECALIS GROUP D (A) 10/31/2019 10:05 AM  
 Culture result: NO GROWTH 5 DAYS 10/25/2019 07:14 PM  
 
Recent Results (from the past 24 hour(s)) GLUCOSE, POC Collection Time: 11/26/19 12:20 PM  
Result Value Ref Range Glucose (POC) 124 (H) 65 - 100 mg/dL Performed by Vahe Orr PROCALCITONIN Collection Time: 11/26/19  1:13 PM  
Result Value Ref Range Procalcitonin 0.84 ng/mL PROTHROMBIN TIME + INR Collection Time: 11/26/19  1:13 PM  
Result Value Ref Range INR 3.2 (H) 0.9 - 1.1 Prothrombin time 30.1 (H) 9.0 - 11.1 sec EKG, 12 LEAD, INITIAL Collection Time: 11/26/19  2:35 PM  
Result Value Ref Range Ventricular Rate 101 BPM  
 Atrial Rate 26 BPM  
 QRS Duration 134 ms Q-T Interval 448 ms QTC Calculation (Bezet) 580 ms Calculated R Axis -19 degrees Calculated T Axis 2 degrees Diagnosis Electronic ventricular pacemaker When compared with ECG of 19-NOV-2019 04:17, 
Vent. rate has increased BY  25 BPM 
Confirmed by Elmira Christianson MD, Humberto Randy ([id number]) on 11/26/2019 6:53:54 PM 
  
GLUCOSE, POC Collection Time: 11/26/19  4:24 PM  
Result Value Ref Range Glucose (POC) 101 (H) 65 - 100 mg/dL Performed by Lorena Mary PTT Collection Time: 11/26/19  4:59 PM  
Result Value Ref Range aPTT 71.1 (H) 22.1 - 32.0 sec  
 aPTT, therapeutic range     58.0 - 77.0 SECS  
GLUCOSE, POC Collection Time: 11/26/19  9:21 PM  
Result Value Ref Range Glucose (POC) 106 (H) 65 - 100 mg/dL Performed by Eveline LEI) METABOLIC PANEL, COMPREHENSIVE Collection Time: 11/27/19  4:18 AM  
Result Value Ref Range Sodium 134 (L) 136 - 145 mmol/L Potassium 4.1 3.5 - 5.1 mmol/L Chloride 96 (L) 97 - 108 mmol/L  
 CO2 32 21 - 32 mmol/L Anion gap 6 5 - 15 mmol/L Glucose 95 65 - 100 mg/dL BUN 20 6 - 20 MG/DL Creatinine 1.08 0.70 - 1.30 MG/DL  
 BUN/Creatinine ratio 19 12 - 20 GFR est AA >60 >60 ml/min/1.73m2 GFR est non-AA >60 >60 ml/min/1.73m2 Calcium 8.3 (L) 8.5 - 10.1 MG/DL Bilirubin, total 1.3 (H) 0.2 - 1.0 MG/DL  
 ALT (SGPT) <6 (L) 12 - 78 U/L  
 AST (SGOT) 10 (L) 15 - 37 U/L Alk. phosphatase 96 45 - 117 U/L Protein, total 5.5 (L) 6.4 - 8.2 g/dL Albumin 2.2 (L) 3.5 - 5.0 g/dL Globulin 3.3 2.0 - 4.0 g/dL A-G Ratio 0.7 (L) 1.1 - 2.2 MAGNESIUM Collection Time: 11/27/19  4:18 AM  
Result Value Ref Range Magnesium 2.1 1.6 - 2.4 mg/dL LACTIC ACID Collection Time: 11/27/19  4:18 AM  
Result Value Ref Range Lactic acid 1.1 0.4 - 2.0 MMOL/L  
CBC W/O DIFF Collection Time: 11/27/19  4:18 AM  
Result Value Ref Range WBC 7.0 4.1 - 11.1 K/uL  
 RBC 2.91 (L) 4.10 - 5.70 M/uL HGB 8.9 (L) 12.1 - 17.0 g/dL HCT 28.6 (L) 36.6 - 50.3 % MCV 98.3 80.0 - 99.0 FL  
 MCH 30.6 26.0 - 34.0 PG  
 MCHC 31.1 30.0 - 36.5 g/dL  
 RDW 18.9 (H) 11.5 - 14.5 % PLATELET 002 393 - 360 K/uL MPV 9.2 8.9 - 12.9 FL  
 NRBC 0.0 0  WBC ABSOLUTE NRBC 0.00 0.00 - 0.01 K/uL PROTHROMBIN TIME + INR Collection Time: 11/27/19  4:18 AM  
Result Value Ref Range INR 2.0 (H) 0.9 - 1.1 Prothrombin time 19.7 (H) 9.0 - 11.1 sec PTT Collection Time: 11/27/19  4:18 AM  
Result Value Ref Range aPTT 50.3 (H) 22.1 - 32.0 sec  
 aPTT, therapeutic range     58.0 - 77.0 SECS  
DIGOXIN Collection Time: 11/27/19  4:18 AM  
Result Value Ref Range  Digoxin level 0.7 (L) 0.90 - 2.00 NG/ML  
LD  
 Collection Time: 11/27/19  4:18 AM  
Result Value Ref Range  85 - 241 U/L  
NT-PRO BNP Collection Time: 11/27/19  4:18 AM  
Result Value Ref Range NT pro-BNP 6,642 (H) <125 PG/ML  
PHOSPHORUS Collection Time: 11/27/19  4:18 AM  
Result Value Ref Range Phosphorus 3.3 2.6 - 4.7 MG/DL  
POC VENOUS BLOOD GAS Collection Time: 11/27/19  4:27 AM  
Result Value Ref Range Device: NASAL CANNULA Flow rate (POC) 2 L/M  
 FIO2 (POC) 28 %  
 pH, venous (POC) 7.486 (H) 7.32 - 7.42    
 pCO2, venous (POC) 42.1 41 - 51 MMHG  
 pO2, venous (POC) 27 25 - 40 mmHg HCO3, venous (POC) 31.7 (H) 23.0 - 28.0 MMOL/L  
 sO2, venous (POC) 56 (L) 65 - 88 % Base excess, venous (POC) 8 mmol/L Allens test (POC) N/A Total resp. rate 16 Site OTHER Specimen type (POC) VENOUS BLOOD    
GLUCOSE, POC Collection Time: 11/27/19  6:30 AM  
Result Value Ref Range Glucose (POC) 114 (H) 65 - 100 mg/dL Performed by Reynaldo Schwarz (CON) GLUCOSE, POC Collection Time: 11/27/19 11:10 AM  
Result Value Ref Range Glucose (POC) 144 (H) 65 - 100 mg/dL Performed by Ivone Enrique Total time spent with patient:  xxx   min. Care Plan discussed with: 
Patient Family RN Consulting Physician [facility],      
 
I have reviewed the flowsheets. Chart and Pertinent Notes have been reviewed. No change in PMH ,family and social history from Consult note.  
 
 
Kelly Conner MD

## 2019-11-27 NOTE — PROGRESS NOTES
[facility] Progress Note DOS:   11/26/2019 NAME:  Jimmy Javier MRN:   [id number] REFERRING PROVIDER:  Dr. Irlanda Oliver PRIMARY CARE PHYSICIAN: Gordy Nihcole MD 
 
 
Chief Complaint:  
Cardiogenic Shock HPI: Ashley Kiser is a 71y.o. year old pleasant white male with a history of HTN, HLD, JOSE, CAD s/p cardiac arrest VFib s/p CABG (2011) c/b sternal would infection and sternectomy, ischemic cardiomyopathy LVEF 15-20%, s/p ICD and with LBBB. He was admitted with acute on chronic systolic heart failure with massive volume overload > 20 lbs, in the setting of atrial fibrillation s/p failed DCCV and underwent DCCV and RHC on 6/18.  S/p BiVICD on 6/21/19 with Dr. Chang Gil. He was discharged home home on IV milrinone on 6/26/19. Patrice Hartman has been followed closely by Dr. Irlanda Oliver and the [facility]. 
  
Mr. Kiser was admitted for acute on chronic systolic heart failure. He underwent implantation of Impella 5.0 due to CS and has completed his LVAD evaluation. He meets criteria for implant of HM3 as DT. He was NYHA Class IV prior to implant of Impella 5.0, has LVEF < 15%, was intolerant of GDMT due to symptomatic hypotension and renal dysfunction. He remains dependent on temporary MCS support. RHC  revealed compensated hemodynamics on Impella. His renal function has improved dramatically with improvement in his hemodynamics. He is not a suitable transplant candidate due to single kidney, sternectomy, debility, and frailty. He was reviewed by [facility] and felt to be a high risk heart transplant candidate due to multiple co-morbidities as well as the afore mentioned conditions. He remained in the CCU and underwent a HM 3 implant as DT on 11/18. 24Hr Events S/p left arm thrombectomy Na+ 134 from 131 Cr stable Adequate diuresis Pain improving Procedure:  Procedure(s): REMOVAL TEMPORARY LEFT VENTRICULAR ASSIST DEVICE, IMPLANTATION OF LEFT VENTRICULAR ASSIST DEVICE PERMANENT (VAD), ECC, JACQUE, EPI AORTIC US BY DR Yasmeen Hansen. POD:  8 Impression / Plan:  
Heart Failure Status: NYHA Class IV INTERMACS Category 2 Chronic systolic heart failure due to ICM, NYHA Class IV, EF < 15%, cardiogenic shock bridged with Impella 5.0 support to HM 3 implant S/p Impella removal and LVAD implant Continue milrinone 0.375mcg/kg/min for now Cont Sildenafil - will need PA Decrease IV Bumex to 2 mg TID  + 25% albumin BID No AA/ARB/ARNI post op- will start as appropriate Strict I/O, daily weights, Na+ restricted diet Continue LVAD education Daily dressing changes Anticoagulation for LVAD, INR goal 2-3 Continue warfarin, ASA, bivalirudin INR 2.9 - repeat at 1300 Con coumadin tonight 3 mg Cont Bivalrudin until INR ~ 3.5-4, then trial INR off bival x 4 hrs  
  
Acute Respiratory Failure post op Resolved Pulmonary hygiene Wean NC for sats > 92% Cont home CPAP - wife bringing back up mask Post op Pain PRN oxycodone Comfort measures L Brachial Artery Thrombosis s/p thrombectomy Surgical management per Dr. Salinas Safe On bivalrudin and warfarin Pain improved Swelling, pain of right arm, acute Stat duplex studies (arterial and venous) Continue AC Confirm PICC placement CKD 3, atrophic left kidney Appreciate Nephrology assistance Assess diuretic dosing daily IV bumex Avoid nephrotoxins Trend labs  
  
CAD s/p CABG Cont low dose ASA- watch platelets No BB d/t RV failure Hold statin due to recent hepatic failure LHC performed 11/13 - low likelihood of benefit from revascularization  
  
Hx of VF arrest s/p BiV-ICD No recent shocks Keep K > 4 and Mg > 2  
   
PAF Currently in ST, Paced Cont PO Amio Increase digoxin to 25 mcg daily Check EKG today to assess rhythm and QTc Hypokalemia Increase Klor Con to 60 mEq TID PO Hx of gout Uric acid today Suspected aspiration pneumonia New, RUL consolidation Suspect aspiration related to over sedation Limit sedatives Sputum culture Cefepime 2g q8h x 7 days total 
  
Urinary retention, c/b serratia UTI and hematuria Appreciate Urology consult Cystoscopy performed 11/13 shows catheter induced cystitis Repeat UA shows resolution of UTI  
  
Malnutrition Appreciate Nutritionist consult Prealbumin down to 7.6 Increase Marinol to 5 mg PO BID 
6 small meals daily Continue mirtazapine - watch Na+ 
  
COPD Appreciate Pulmonology assistance Continue nebs PRN   
PFTs complete 
  
Anemia Multifactorial, due to hemolysis + epistaxis + hematuria Intolerant of octreotide or doxycycline for AVM ppx due to prolonged QTc Transfuse for Hgb goal > 8 
  
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  
  
Hx of sternal wound infection, sternectomy Sternum closed post op- monitor  
  
Vocal cord paralysis Improved Speech therapy following May need MBS Debility Continue PT/OT  
  
Ppx Protonix PT/OT Will continue cefepime for 2 weeks post op per Dr. Kendrick Rubin for Impella prophylaxis Bowel regimen Cont Mechanical soft PICC placed Dispo: 
Transfer to [facility] History: 
Past Medical History:  
Diagnosis Date  Degenerative disc disease, lumbar  Heart failure ([address]  High cholesterol  Hypertension  Paroxysmal atrial fibrillation ([address] 4/2/2019  Spinal stenosis Past Surgical History:  
Procedure Laterality Date  COLONOSCOPY Left 11/11/2019 COLONOSCOPY at bedside performed by Cirilo Zhao MD at [address] [place] [address]  HX CORONARY ARTERY BYPASS GRAFT    
 triple  HX HERNIA REPAIR    
 HX IMPLANTABLE CARDIOVERTER DEFIBRILLATOR  MS CARDIOVERSION ELECTIVE ARRHYTHMIA EXTERNAL N/A 6/10/2019 EP CARDIOVERSION performed [place]a [place]on [place] [address], [facility] CATH LAB  MS CARDIOVERSION ELECTIVE ARRHYTHMIA EXTERNAL N/A 6/18/2019 EP CARDIOVERSION performed by Aleshia Martinez MD at [address] CATH LAB  IN INSJ/RPLCMT PERM DFB W/TRNSVNS LDS 1/DUAL CHMBR N/A 2019 INSERT ICD BIV MULTI performed by Vreonica Christopher MD at [address] CATH LAB  IN TCAT IMPL WRLS P-ART PRS SNR L-T HEMODYN MNTR N/A 2019 IMPLANT HEART FAILURE MONITORING DEVICE performed by Abhijeet Marlow MD at [address] CATH LAB Social History Socioeconomic History  Marital status:  Spouse name: Not on file  Number of children: Not on file  Years of education: Not on file  Highest education level: Not on file Occupational History  Not on file Social Needs  Financial resource strain: Not on file  Food insecurity:  
  Worry: Not on file Inability: Not on file  Transportation needs:  
  Medical: Not on file Non-medical: Not on file Tobacco Use  Smoking status: Former Smoker Last attempt to quit: 2010 Years since quittin.9  Smokeless tobacco: Never Used Substance and Sexual Activity  Alcohol use: Not Currently Comment: rarely  Drug use: Never  Sexual activity: Not on file Lifestyle  Physical activity:  
  Days per week: Not on file Minutes per session: Not on file  Stress: Not on file Relationships  Social connections:  
  Talks on phone: Not on file Gets together: Not on file Attends [employer] service: Not on file Active member of club or organization: Not on file Attends meetings of clubs or organizations: Not on file Relationship status: Not on file  Intimate partner violence:  
  Fear of current or ex partner: Not on file Emotionally abused: Not on file Physically abused: Not on file Forced sexual activity: Not on file Other Topics Concern [address] Service Not Asked  Blood Transfusions Not Asked  Caffeine Concern Not Asked  Occupational Exposure Not Asked Po Coop Hazards Not Asked  Sleep Concern Not Asked  Stress Concern Not Asked  Weight Concern Not Asked  Special Diet Not Asked  Back Care Not Asked  Exercise Not Asked  Bike Helmet Not Asked  Seat Belt Not Asked  Self-Exams Not Asked Social History Narrative  Not on file Family History Problem Relation Age of Onset  Lung Disease Mother  Hypertension Mother [facility] Arthritis-osteo Mother  Heart Disease Mother  Heart Disease Father  Diabetes Father Current Medications:  
Current Facility-Administered Medications Medication Dose Route Frequency Provider Last Rate Last Dose  dronabinol (MARINOL) capsule 5 mg  5 mg Oral ACB&D Jacoob Herrera NP   5 mg at 11/26/19 1709  bumetanide (BUMEX) injection 2 mg  2 mg IntraVENous TID Jacobo Herrera NP   2 mg at 11/26/19 2140  
 albumin human 25% (BUMINATE) solution 12.5 g  12.5 g IntraVENous BID Jacobo Herrera NP   Stopped at 11/26/19 1800  
 digoxin (LANOXIN) tablet 0.25 mg  0.25 mg Oral DAILY Jacobo Herrera NP   0.25 mg at 11/26/19 0945  potassium chloride SR (KLOR-CON 10) tablet 60 mEq  60 mEq Oral TID Dre Rosario NP   60 mEq at 11/26/19 2140  cefepime (MAXIPIME) 2 g in 0.9% sodium chloride (MBP/ADV) 100 mL  2 g IntraVENous Q8H Юлия Herrera  mL/hr at 11/26/19 1829 2 g at 11/26/19 1829  
 0.9% sodium chloride infusion  3 mL/hr IntraVENous CONTINUOUS Batsheva Lawrence MD 3 mL/hr at 11/26/19 0847 3 mL/hr at 11/26/19 0847  
 magnesium oxide (MAG-OX) tablet 400 mg  400 mg Oral BID Sierra Castro NP   400 mg at 11/26/19 1709  
 oxyCODONE IR (ROXICODONE) tablet 5 mg  5 mg Oral Q6H PRN Sherry Arteaga MD      
 mirtazapine (REMERON) tablet 15 mg  15 mg Oral QHS Sierra Castro NP   15 mg at 11/26/19 2140  balsam peru-castor oil (VENELEX) ointment   Topical TID Etelvina Nunez MD      
 [facility]) capsule 0.4 mg  0.4 mg Oral DAILY Sanjiv Amrita Silva MD   0.4 mg at 11/26/19 6759  aspirin chewable tablet 81 mg  81 mg Oral DAILY Shana Sims NP   81 mg at 11/26/19 0945  sildenafil (antihypertensive) (REVATIO) tablet 20 mg  20 mg Oral TID Kris BHAKTA NP   20 mg at 11/26/19 2141  pantoprazole (PROTONIX) tablet 40 mg  40 mg Oral ACB Shana Sims NP   40 mg at 11/26/19 0630  
 insulin lispro (HUMALOG) injection   SubCUTAneous TIDAC Shana Sims NP   Stopped at 11/21/19 1630  
 insulin lispro (HUMALOG) injection   SubCUTAneous AC&HS Kris BHAKTA NP   Stopped at 11/26/19 1130  Warfarin MD/NP dosing   Other PRN Liza Altman MD      
 epoetin yvonne-epbx (RETACRIT) injection 20,000 Units  20,000 Units SubCUTAneous Q7D Logan Kincaid MD   20,000 Units at 11/26/19 0721  
 0.9% sodium chloride infusion 250 mL  250 mL IntraVENous PRN Lisa Damon MD      
 bivalirudin (ANGIOMAX) 250 mg in 0.9% sodium chloride (MBP/ADV) 50 mL  0.02-2.5 mg/kg/hr IntraVENous TITRATE Kris BHAKTA NP 4 mL/hr at 11/26/19 2230 0.2198 mg/kg/hr at 11/26/19 2230  lidocaine (LIDODERM) 5 % patch 1 Patch  1 Patch TransDERmal Q24H Nora Sims NP   1 Patch at 11/26/19 1829  
 0.9% sodium chloride infusion  9 mL/hr IntraVENous CONTINUOUS Omari Barba MD   Stopped at 11/22/19 1400  
 0.45% sodium chloride infusion  10 mL/hr IntraVENous CONTINUOUS Omari Barba MD   Stopped at 11/22/19 1736  sodium chloride (NS) flush 5-40 mL  5-40 mL IntraVENous Q8H Omari Barba MD   40 mL at 11/26/19 2141  sodium chloride (NS) flush 5-40 mL  5-40 mL IntraVENous PRN Omari Barba MD      
 acetaminophen (TYLENOL) tablet 650 mg  650 mg Oral Q6H PRN Omari Barba MD   650 mg at 11/26/19 2140  
 naloxone [facility]) injection 0.4 mg  0.4 mg IntraVENous PRN Omari Barba MD      
 ondansetron [facility]) injection 4 mg  4 mg IntraVENous Q4H PRN Omari Barba MD   4 mg at 11/20/19 2000  albuterol-ipratropium (DUO-NEB) 2.5 MG-0.5 MG/3 ML  3 mL Nebulization Q6H PRN Leanne Burton MD      
 senna-docusate (PERICOLACE) 8.6-50 mg per tablet 1 Tab  1 Tab Oral DAILY Leanne Burton MD   Stopped at 11/26/19 0900  polyethylene glycol (MIRALAX) packet 17 g  17 g Oral DAILY Leanne Burton MD   Stopped at 11/26/19 0900  
 bisacodyl (DULCOLAX) tablet 5 mg  5 mg Oral DAILY PRN Leanne Burton MD      
 sucralfate (CARAFATE) tablet 1 g  1 g Oral AC&HS Leanne Burton MD   1 g at 11/26/19 2140  
 0.9% sodium chloride infusion 250 mL  250 mL IntraVENous PRN Stefano Gaytan MD      
 influenza vaccine 2019-20 (6 mos+)(PF) (FLUARIX/FLULAVAL/FLUZONE QUAD) injection 0.5 mL  0.5 mL IntraMUSCular PRIOR TO DISCHARGE Black Altman MD      
 hydrALAZINE (APRESOLINE) 20 mg/mL injection 20 mg  20 mg IntraVENous Q6H PRN Shana Sims NP   20 mg at 11/19/19 0547  
 dextrose 10 % infusion 125-250 mL  125-250 mL IntraVENous PRN Beth Gee MD   Stopped at 11/18/19 0700  
 milrinone (PRIMACOR) 20 MG/100 ML D5W infusion  0.375 mcg/kg/min IntraVENous TITRATE Bernadette QUEZADA MD 10 mL/hr at 11/26/19 2129 0.375 mcg/kg/min at 11/26/19 2129  
 insulin regular (NOVOLIN R, HUMULIN R) 100 Units in 0.9% sodium chloride 100 mL infusion  1-50 Units/hr IntraVENous TITRATE Marta Herrera NP   Stopped at 11/21/19 2423  ELECTROLYTE REPLACEMENT NOTE: Nurse to review Serum Potassium and Magnesuim levels and Initiate Electrolyte Replacement Protocol as needed  1 Each Other PRN Montrell Castillo NP Allergies: No Known Allergies ROS:   
Review of Systems Constitutional: Negative for chills, fever and malaise/fatigue. HENT: Positive for hearing loss. Respiratory: Negative for cough, sputum production and shortness of breath. Cardiovascular: Positive for leg swelling. Negative for chest pain. Gastrointestinal: Negative for heartburn, nausea and vomiting. Musculoskeletal: Negative for myalgias. Skin: Negative. Neurological: Positive for weakness. Negative for dizziness and headaches. Endo/Heme/Allergies: Negative. Psychiatric/Behavioral: Positive for depression. Physical Exam:  
Physical Exam 
Vitals signs and nursing note reviewed. Constitutional:   
   Appearance: He is well-developed and well-nourished. HENT:  
   Head: Normocephalic. Eyes:  
   Pupils: Pupils are equal, round, and reactive to light. Neck: Musculoskeletal: Normal range of motion and neck supple. Vascular: No JVD. Cardiovascular:  
   Rate and Rhythm: Normal rate and regular rhythm. Heart sounds: Normal heart sounds. Comments: + VAD hum Pulmonary:  
   Effort: Pulmonary effort is normal.  
   Breath sounds: Normal breath sounds. Abdominal:  
   General: Bowel sounds are normal. There is no distension. Palpations: Abdomen is soft. Musculoskeletal: Normal range of motion. General: Edema present. Comments: bilaterally Skin: 
   General: Skin is warm and dry. Neurological:  
   Mental Status: He is alert and oriented to person, place, and time. Vitals:  
Visit Vitals BP (!) 74/56 Pulse (!) 106 Temp 98.6 °F (37 °C) Resp 20 Ht 6' 2\" (1.88 m) Wt 196 lb 1.6 oz (89 kg) SpO2 92% BMI 25.18 kg/m² Temp (24hrs), Av.8 °F (36.6 °C), Min:97.2 °F (36.2 °C), Max:98.6 °F (37 °C) Hemodynamics: 
 CVP: CVP (mmHg): 7 mmHg (19 0930) Admission Weight: Last Weight Weight: 192 lb 10.9 oz (87.4 kg) Weight: 196 lb 1.6 oz (89 kg) Intake / Output / Drain: 
Last 24 hrs.:  
 
Intake/Output Summary (Last 24 hours) at 2019 2327 Last data filed at 2019 1656 Gross per 24 hour Intake 2008. 13 ml Output 3065 ml Net -1056.87 ml Drains:  
24h: 380 8h: 180 Extubation Date / Time:  19 at 1030am 
 
Oxygen Therapy: 
Oxygen Therapy O2 Sat (%): 92 % (19 1915) Pulse via Oximetry: 104 beats per minute (11/22/19 1400) O2 Device: Nasal cannula (11/26/19 2100) O2 Flow Rate (L/min): 2 l/min (11/26/19 2100) FIO2 (%): 50 % (11/19/19 1035) VAD Data: LVAD (Heartmate) Pump Speed (RPM): 5200 Pump Flow (LPM): 4.4 PI (Pulsitility Index): 3.5 Power: 3.7 MAP: 74  Test: No 
Back Up  at Bedside & Labeled: Yes Power Module Test: No 
Driveline Site Care: No 
Driveline Dressing: Clean, Dry, and Intact Drive Line Exam: 
Stabilization device intact: yes Line inspected for damage: yes Appearance: no edema, erythema, drainage Stabilization Method: anchor to abd Recent Labs:  
Labs Latest Ref Rng & Units 11/26/2019 11/25/2019 11/25/2019 11/25/2019 11/24/2019 11/24/2019 11/23/2019 WBC 4.1 - 11.1 K/uL 7.5 - - 6.0 - 6.2 6.9  
RBC 4.10 - 5.70 M/uL 3.13(L) - - 3.15(L) - 3.12(L) 3.03(L) Hemoglobin 12.1 - 17.0 g/dL 9.4(L) - - 9. 5(L) - 9. 5(L) 9.3(L) Hematocrit 36.6 - 50.3 % 30. 9(L) - - 30. 8(L) - 30. 0(L) 29. 5(L) MCV 80.0 - 99.0 FL 98.7 - - 97.8 - 96.2 97.4 Platelets 596 - 122 K/uL 194 - - 170 - 134(L) 130(L) Lymphocytes 12 - 49 % - - - - - - - Monocytes 5 - 13 % - - - - - - - Eosinophils 0 - 7 % - - - - - - - Basophils 0 - 1 % - - - - - - - Albumin 3.5 - 5.0 g/dL 2. 1(L) - - 2. 0(L) - 2. 2(L) 2. 4(L) Calcium 8.5 - 10.1 MG/DL 8.6 - - 7. 9(L) - 8. 2(L) 8.4(L) SGOT 15 - 37 U/L 11(L) - - 14(L) - 16 18 Glucose 65 - 100 mg/dL 161(H) - - 190(H) - 96 97 BUN 6 - 20 MG/DL 20 - - 20 - 22(H) 22(H) Creatinine 0.70 - 1.30 MG/DL 1.10 - - 1.04 - 1.08 1.05 Sodium 136 - 145 mmol/L 134(L) - - 131(L) - 131(L) 132(L) Potassium 3.5 - 5.1 mmol/L 4.4 - 3. 4(L) 3. 2(L) 3.5 3. 3(L) 3. 2(L) TSH 0.36 - 3.74 uIU/mL - - - - - - -  
PSA 0.01 - 4.0 ng/mL - - - - - - -  
LDH 85 - 241 U/L 235 258(H) - - - 310(H) 341(H) CEA ng/mL - - - - - - - Some recent data might be hidden EKG:  
EKG Results Procedure 720 Value Units Date/Time EKG, 12 LEAD, INITIAL [[id number]] Collected:  11/26/19 1435 Order Status:  Completed Updated:  11/26/19 1854 Ventricular Rate 101 BPM   
  Atrial Rate 26 BPM   
  QRS Duration 134 ms Q-T Interval 448 ms QTC Calculation (Bezet) 580 ms Calculated R Axis -19 degrees Calculated T Axis 2 degrees Diagnosis -- Electronic ventricular pacemaker When compared with ECG of 19-NOV-2019 04:17, 
Vent. rate has increased BY  25 BPM 
Confirmed by Leida Todd MD, Michele Sales ([id number]) on 11/26/2019 6:53:54 PM 
  
 EKG, 12 LEAD, INITIAL [[id number]] Order Status:  Sent EKG, 12 LEAD, INITIAL [[id number]] Collected:  11/19/19 0417 Order Status:  Completed Updated:  11/19/19 1224 Ventricular Rate 76 BPM   
  Atrial Rate 0 BPM   
  QRS Duration 156 ms   
  Q-T Interval 564 ms QTC Calculation (Bezet) 634 ms Calculated R Axis 81 degrees Calculated T Axis 120 degrees Diagnosis -- Electronic ventricular pacemaker Baseline artifact When compared with ECG of 13-NOV-2019 04:40, 
Vent. rate has increased BY   2 BPM 
Confirmed by Sabiha Brandt M.D., Lei Henry ([id number]) on 11/19/2019 12:23:52 PM 
  
 EKG, 12 LEAD, INITIAL [[id number]] Order Status:  Canceled EKG, 12 LEAD, INITIAL [[id number]] Order Status:  Canceled EKG, 12 LEAD, INITIAL [[id number]] Order Status:  Canceled EKG, 12 LEAD, INITIAL [[id number]] Order Status:  Canceled EKG, 12 LEAD, INITIAL [[id number]] Order Status:  Canceled EKG, 12 LEAD, INITIAL [[id number]] Collected:  11/13/19 0440 Order Status:  Completed Updated:  11/13/19 2200 Ventricular Rate 74 BPM   
  Atrial Rate 66 BPM   
  QRS Duration 166 ms   
  Q-T Interval 488 ms QTC Calculation (Bezet) 541 ms Calculated R Axis -15 degrees Calculated T Axis 157 degrees Diagnosis -- Electronic ventricular pacemaker When compared with ECG of 11-NOV-2019 04:49, No significant change was found Confirmed by Chnao Jiménez M.D., Baylee Weaver ([id number]) on 11/13/2019 10:00:38 PM 
  
 EKG, 12 LEAD, INITIAL [[id number]] Order Status:  Canceled EKG, 12 LEAD, INITIAL [[id number]] Collected:  11/11/19 0449 Order Status:  Completed Updated:  11/11/19 6286 Ventricular Rate 74 BPM   
  Atrial Rate 39 BPM   
  QRS Duration 158 ms Q-T Interval 504 ms QTC Calculation (Bezet) 559 ms Calculated R Axis 13 degrees Calculated T Axis 175 degrees Diagnosis -- Electronic ventricular pacemaker When compared with ECG of 05-NOV-2019 10:44, 
Vent. rate has decreased BY   9 BPM 
Confirmed by Chano Jiménez M.D., Baylee Weaver ([id number]) on 11/11/2019 8:54:27 AM 
  
 EKG, 12 LEAD, INITIAL [[id number]] Order Status:  Canceled EKG, 12 LEAD, INITIAL [[id number]] Order Status:  Canceled EKG, 12 LEAD, INITIAL [[id number]] Order Status:  Canceled EKG, 12 LEAD, INITIAL [[id number]] Collected:  11/05/19 1044 Order Status:  Completed Updated:  11/05/19 1226 Ventricular Rate 83 BPM   
  Atrial Rate 83 BPM   
  P-R Interval 80 ms QRS Duration 162 ms Q-T Interval 502 ms QTC Calculation (Bezet) 589 ms Calculated R Axis 2 degrees Calculated T Axis -156 degrees Diagnosis -- Electronic ventricular pacemaker Marked T wave abnormality, consider  
anterolateral ischemia When compared with ECG of 02-NOV-2019 10:42, No significant change was found Confirmed by Divine Sheehan M.D., Alex Calle ([id number]) on 11/5/2019 12:25:52 PM 
  
 EKG, 12 LEAD, INITIAL [[id number]] Collected:  11/02/19 1042 Order Status:  Completed Updated:  11/03/19 9861 Ventricular Rate 91 BPM   
  Atrial Rate 91 BPM   
  P-R Interval 108 ms QRS Duration 148 ms Q-T Interval 524 ms QTC Calculation (Bezet) 644 ms Calculated R Axis 5 degrees Calculated T Axis -152 degrees Diagnosis --  
  Sinus rhythm with short KS with occasional premature ventricular complexes and fusion complexes Nonspecific intraventricular block Marked T wave abnormality, consider anterolateral ischemia When compared with ECG of 26-OCT-2019 06:55, Sinus rhythm has replaced Electronic ventricular pacemaker Prolonged QT Confirmed by Tre Dolan ([id number]) on 11/3/2019 6:35:23 AM 
  
 EKG, 12 LEAD, INITIAL [[id number]] Order Status:  Canceled EKG, 12 LEAD, INITIAL [[id number]] Collected:  10/26/19 1994 Order Status:  Completed Updated:  10/26/19 1807 Ventricular Rate 80 BPM   
  Atrial Rate 76 BPM   
  QRS Duration 174 ms Q-T Interval 462 ms QTC Calculation (Bezet) 532 ms Calculated R Axis 28 degrees Calculated T Axis 150 degrees Diagnosis -- Atrial flutter Left bundle branch block When compared with ECG of 25-OCT-2019 18:20, 
Electronic demand pacing is not noted Confirmed by Lee Little ([id number]) on 10/26/2019 6:07:36 PM 
  
 EKG, 12 LEAD, INITIAL [[id number]] Collected:  10/25/19 1820 Order Status:  Completed Updated:  10/26/19 1749 Ventricular Rate 72 BPM   
  Atrial Rate 72 BPM   
  P-R Interval 86 ms QRS Duration 116 ms   
  Q-T Interval 506 ms QTC Calculation (Bezet) 554 ms Calculated P Axis 9 degrees Calculated R Axis -68 degrees Calculated T Axis 10 degrees Diagnosis --  
  undetermied rhythm possible atrial flutter Nonspecific intraventricular conduction delay Ventricular paced rhythm When compared with ECG of 26-JUN-2019 11:39, 
Significant changes have occurred Confirmed by Lee Little ([id number]) on 10/26/2019 5:49:02 PM 
  
  
No specialty comments available. 10/25/19 OR ECHO JACQUE INTRAOP  11/18/2019 Narrative See Anesthesia Procedure note for procedure details. Signed by:   
  
Echo Results  (Last 48 hours) None CXR Results  (Last 48 hours)  
          
 11/26/19 1708  XR CHEST PORT Final result  Impression:  IMPRESSION:   
 1. Right upper extremity PICC with tip terminating in the mid SVC. No  
pneumothorax. 2. Unchanged pulmonary interstitial edema, small bilateral pleural effusions and  
bibasilar atelectasis. Unchanged cardiomegaly. Narrative:  EXAM:  XR CHEST PORT INDICATION:   assess PICC placement COMPARISON: Chest radiograph 11/26/2019. FINDINGS: AP radiograph of the chest was obtained. Right upper extremity PICC with tip terminating in the mid SVC. No pneumothorax. LVAD is again noted, as well as a left chest cardiac device. Stable mediastinal  
drains and bilateral chest tubes. No pneumothorax. Unchanged small bilateral  
pleural effusions and bibasilar atelectasis. Unchanged pulmonary interstitial  
edema and cardiomegaly. 11/26/19 0502  XR CHEST PORT Final result Impression:  IMPRESSION: No significant change except for minimal improvement in interstitial  
edema. Narrative:  EXAM:  XR CHEST PORT. INDICATION: Postop LVAD implant. COMPARISON: 11/25/2019. FINDINGS:   
A portable AP radiograph of the chest was obtained at 0405 hours. There are  
sternal sutures and mediastinal clips, a pacemaker in the left chest and a left  
ventricular assist device in place, all unchanged in position. Lines and tubes: The patient is on a cardiac monitor and nasal oxygen. The  
right arm PICC line is unchanged. The mediastinal drain and bilateral chest  
tubes are unchanged in position. Lungs: The interstitial edema throughout the lungs is minimally improved and  
there continues to be atelectasis at the costophrenic angles. Pleura: There are small pleural effusions. Mediastinum: The cardiac and mediastinal contours and pulmonary vascularity are  
normal.  
Bones and soft tissues: There surgical clips and skin staples in the right  
infraclavicular region. 11/25/19 0430  XR CHEST PORT Final result Impression:  IMPRESSION: Persistent prominence of the pulmonary interstitial markings with  
continued evidence of cardiomegaly. Interval development of hazy density  
involving the right upper lobe suggestive of developing infiltration. Narrative:  Portable chest single view dated 11/25/2019 Comparison chest dated 11/24/2019 History is post LVAD implants A single frontal view of the chest was obtained. The cardiac silhouette  
continues to be enlarged. There is abnormal prominence of the pulmonary  
interstitial markings. There has been development of some hazy density in the  
region of the right upper lobe. There is veiling opacity at both lung bases. This is suggestive of the presence of bilateral pleural effusions. Parenchymal  
disease at the lung bases cannot be excluded. The PICC line on the right is  
unchanged in position. TIERRA Zarco [address] [place] [address], [address] Office [phone] Fax [phone] 
24 hour VAD/HF Pager: [phone] [facility] ATTENDING ADDENDUM Patient was seen and examined in person. Data and notes were reviewed. I have discussed and agree with the plan as noted in the NP note above without further additions. Tadeo Urban MD PhD 
[phone] [facility]

## 2019-11-27 NOTE — PROGRESS NOTES
Problem: Self Care Deficits Care Plan (Adult) Goal: *Acute Goals and Plan of Care (Insert Text) Description FUNCTIONAL STATUS PRIOR TO ADMISSION: Patient was modified independent using a single point cane for functional mobility. Patient able to shower and dress himself. However, patient required assistance for household management from his wife. HOME SUPPORT: The patient lived alone with wife to provide assistance. Occupational Therapy Goals: 
 
Goals reviewed and continued/modified as follows 11/20/19 s/p LVAD implantation 1. Patient will perform upper body dressing including LVAD switchovers with moderate assistance within 7 day(s). 2.  Patient will perform lower body dressing with minimal assistance within 7 day(s). 3.  Patient will perform grooming with supervision/setup within 7 day(s). 4.  Patient will perform toilet transfers supervision/setupmodified independence within 7 day(s). 5.  Patient will perform all aspects of toileting with minimal assistance within 7 day(s). 6.  Patient will participate in upper extremity therapeutic exercise/activities with supervision/set-up for 5 minutes within 7 day(s). 7.  Patient will utilize energy conservation techniques during functional activities with verbal cues within 7 day(s). 8. Patient will verbalize LVAD terminology with verbal cues within 7 day (s). Goals reviewed and continued/modified as follows 11/12/19 1. Patient will perform bathing with supervision/set-up within 7 day(s). 2.  Patient will perform lower body dressing with supervision/set-up within 7 day(s). 3.  Patient will perform grooming with modified independence within 7 day(s). 4.  Patient will perform toilet transfers with modified independence within 7 day(s). 5.  Patient will perform all aspects of toileting with supervision/set-up within 7 day(s).  
6.  Patient will participate in upper extremity therapeutic exercise/activities with supervision/set-up for 5 minutes within 7 day(s). 7.  Patient will utilize energy conservation techniques during functional activities with verbal cues within 7 day(s). 8. Patient will verbalize LVAD terminology with verbal cues within 7 day (s) in preparation for implant. Continue all goals 10/30/19 post re-eval for Impella removal  
Initiated 10/28/2019 1. Patient will perform bathing with supervision/set-up within 7 day(s). 2.  Patient will perform lower body dressing with supervision/set-up within 7 day(s). 3.  Patient will perform grooming with modified independence within 7 day(s). 4.  Patient will perform toilet transfers with modified independence within 7 day(s). 5.  Patient will perform all aspects of toileting with supervision/set-up within 7 day(s). 6.  Patient will participate in upper extremity therapeutic exercise/activities with supervision/set-up for 5 minutes within 7 day(s). 7.  Patient will utilize energy conservation techniques during functional activities with verbal cues within 7 day(s). Outcome: Progressing Towards Goal 
  
OCCUPATIONAL THERAPY TREATMENT Patient: Radha Granda (75 y.o. male) Date: 11/27/2019 Diagnosis: Acute decompensated heart failure ([facility] [address] [I50.9] <principal problem not specified> Procedure(s) (LRB): LEFT BRACHIAL THROMBECTOMY (Left) 2 Days Post-Op Precautions: Fall, Sternal 
Chart, occupational therapy assessment, plan of care, and goals were reviewed. ASSESSMENT Patient continues with skilled OT services and is progressing towards goals. He remains limited by decreased activity tolerance, poor standing tolerance for ADLs, impaired balance, UE tremors and fine motor coordination deficits. Pt recalled 2/3 sternal precautions as well as 4/5 LVAD pieces of equipment.  He required verbal cuing and prompting for naming battery and additional time and cues to placing battery in/out of clip. He required min A x 2 for sit to stand transfers with pt tolerating standing x 2 trials for total A hygiene for ~45 seconds each. With activity, max  bpm but recovered to low 100s with rest.  
 
Current Level of Function Impacting Discharge (ADLs): total A for standing hygiene, total A socks, mod to max UB Other factors to consider for discharge: LVAD PLAN : 
Patient continues to benefit from skilled intervention to address the above impairments. Continue treatment per established plan of care. to address goals. Recommend with staff: OOB to chair for meals Recommend next OT session: LVAD, fine motor, batteries<>clips Recommendation for discharge: (in order for the patient to meet his/her long term goals) Therapy 3 hours per day 5-7 days per week This discharge recommendation: 
Has been made in collaboration with the attending provider and/or case management IF patient discharges home will need the following DME: to be determined (TBD) SUBJECTIVE:  
Patient stated I need to sit down. I am getting so tired. Alex Perez OBJECTIVE DATA SUMMARY:  
Cognitive/Behavioral Status: 
Neurologic State: Alert Orientation Level: Oriented X4 Cognition: Appropriate for age attention/concentration Functional Mobility and Transfers for ADLs: 
Bed Mobility: 
Supine to Sit: Minimum assistance Sit to Supine: Moderate assistance;Maximum assistance;Assist x2 Scooting: Minimum assistance;Assist x2 Transfers: 
Sit to Stand: Minimum assistance;Assist x2 Functional Transfers Toilet Transfer : Minimum assistance;Assist x2 Bed to Chair: Minimum assistance;Assist x2 Balance: 
Sitting: Intact; Without support Standing: Impaired; With support Standing - Static: Poor Standing - Dynamic : Poor ADL Intervention: 
  
 
 Patient recalled VAD equipment in prep for ADL routine with supervision and min cues for recall 4/5 items below: Item Correct Identification Location Function Comments  X Emergency Bag NT (NOT TESTED) Battery Clips X   Increased cues Display Module NT Battery Charger NT Power Module NT Battery  X   Cues and prompts Drive Line X Power Leads X Battery Holster NT Holster Vest NT Lower Body Dressing Assistance Socks: Total assistance (dependent) Toileting Bowel Hygiene: Total assistance (dependent)(standing x trials for 45 sec each) Activity Tolerance:  
Fair, requires frequent rest breaks, and observed SOB with activity Please refer to the flowsheet for vital signs taken during this treatment. After treatment patient left in no apparent distress:  
Supine in bed and Call bell within reach COMMUNICATION/COLLABORATION:  
The patients plan of care was discussed with: Physical Therapist and Registered Nurse Arslan Rodriguez OT Time Calculation: 39 mins

## 2019-11-27 NOTE — PROGRESS NOTES
Problem: Mobility Impaired (Adult and Pediatric) Goal: *Acute Goals and Plan of Care (Insert Text) Description FUNCTIONAL STATUS PRIOR TO ADMISSION: Patient was modified independent using a single point cane for functional mobility. Patient reports an increasingly sedentary lifestyle 2* fatigue and SOB/dyspnea. Retired (so is his wife). Patient reports x 3 falls within the last couple of weeks. Patient is wearing either nasal cannula or CPAP at night. LVAD work-up has been initiated. HOME SUPPORT PRIOR TO ADMISSION: The patient lived with his wife, but did not require physical assistance. Physical Therapy Goals: 
 
Re-evaluation completed 11/20/2019 and new goals formulated following LVAD implantation. 1.  Patient will move from supine to sit and sit to supine, scoot up and down and roll side to side in bed with minimal assistance/contact guard assist within 7 days. 2.  Patient will perform sit to/from stand with minimal assistance/contact guard assist within 7 days. 3.  Patient will ambulate 150 feet with least restrictive assistive device and minimal assistance/contact guard assist within 7 days. 4.  Patient will ascend/descend 4 stairs with handrail(s) with minimal assistance/contact guard assist within 7 days. 5.  Patient will perform cardiac exercises per protocol with supervision within 7 days. 6.  Patient will verbally and functionally recall 3/3 sternal precautions within 7 days. 7.  Patient will perform power exchange for power module to/from battery with moderate assistance  within 7 days. Initiated 10/27/2019; updated 11/15/2019 1. Patient will move from supine to sit and sit to supine, scoot up and down and roll side to side in bed with independence within 7 days. 2.  Patient will perform sit to/from stand with supervision/set-up within 7 days. 3.  Patient will ambulate 200 feet with least restrictive assistive device and supervision/set-up within 7 days. 4.  Patient will ascend/descend 4 stairs with  handrail(s) with supervision/set-up within 7 days for functional strengthening and community reintegration. Malena Rm 5.  Patient will verbally and functionally recall 3/3 sternal precautions within 7 days in preparation for LVAD implantation. 6.  Patient will perform a mock power exchange for power module to/from battery with supervision/set-up within 7 days in preparation for LVAD implantation. 1.  Patient will move from supine to sit and sit to supine, scoot up and down and roll side to side in bed with independence within 7 days. 2.  Patient will perform sit to/from stand with supervision/set-up within 7 days. 3.  Patient will ambulate 150 feet with least restrictive assistive device and supervision/set-up within 7 days. 4.  Patient will ascend/descend 4 stairs with  handrail(s) with supervision/set-up within 7 days for functional strengthening and community reintegration. Malena Rm 5.  Patient will verbally and functionally recall 3/3 sternal precautions within 7 days in preparation for LVAD implantation. 6.  Patient will perform a mock power exchange for power module to/from battery with supervision/set-up within 7 days in preparation for LVAD implantation. Outcome: Progressing Towards Goal 
 PHYSICAL THERAPY TREATMENT Patient: Ciro Bradford (75 y.o. male) Date: 11/27/2019 [facility]: [facility] [I50.9] <principal problem not specified> Procedure(s) (LRB): LEFT BRACHIAL THROMBECTOMY (Left) 2 Days Post-Op Precautions: Fall, Sternal 
Chart, physical therapy assessment, plan of care and goals were reviewed. ASSESSMENT Patient continues with skilled PT services and is progressing towards goals. Patient received elevated supine in bed, eating ice cream, agreeable to therapy. Overall min Ax2 for supine>sit and transfers.  Demonstrated improved static standing balance this session but with very poor endurance for activity. Standing x45 seconds max, tremulous throughout. Was able to follow verbal cues for weightshifting towards toes and to \"soften\" knees to improve retropulsion. Patient requesting to use RW however deferred this and explained importance of adhering to sternal precautions. Sidestepping with min Ax2 back to bed after toileting. Improving with LVAD vocabulary recall (4/5 and could recall 5th \"batteries\" with cues). Returned to supine for RN to complete dressing teaching with wife. Continue to anticipate extensive rehab needs. Current Level of Function Impacting Discharge (mobility/balance): min Ax2 transfers, mod-max Ax2 for sit>supine Other factors to consider for discharge: decreased endurance, new LVAD PLAN : 
Patient continues to benefit from skilled intervention to address the above impairments. Continue treatment per established plan of care. to address goals. Recommendation for discharge: (in order for the patient to meet his/her long term goals) Therapy 3 hours per day 5-7 days per week This discharge recommendation: 
Has not yet been discussed the attending provider and/or case management IF patient discharges home will need the following DME: to be determined (TBD) SUBJECTIVE:  
Patient stated But I didn't walk.  reiterated importance of functional activities and building endurance and balance in static standing prior to ambulation OBJECTIVE DATA SUMMARY:  
Cardiac diagnosis intervention: The patient stated 2/3 sternal precautions when prompted. Reviewed the \"3 Ps\" with patient. The patient required min cues to maintain sternal precautions during functional activity. Critical Behavior: 
Neurologic State: Alert Orientation Level: Oriented X4 Cognition: Appropriate for age attention/concentration Safety/Judgement: Decreased insight into deficits, Decreased awareness of need for safety, Decreased awareness of need for assistance Functional Mobility Training: 
Bed Mobility: 
Supine to Sit: Minimum assistance Sit to Supine: Moderate assistance;Maximum assistance;Assist x2 Scooting: Minimum assistance;Assist x2 Transfers: 
Sit to Stand: Minimum assistance;Assist x2 Stand to Sit: Minimum assistance;Assist x2 Bed to Chair: Minimum assistance;Assist x2 Balance: 
Sitting: Intact; Without support Standing: Impaired; With support Standing - Static: Poor Standing - Dynamic : Poor Activity Tolerance:  
Fair and fatigues very quickly Please refer to the flowsheet for vital signs taken during this treatment. After treatment patient left in no apparent distress:  
Supine in bed, Call bell within reach, and Caregiver / family present COMMUNICATION/COLLABORATION:  
The patients plan of care was discussed with: Occupational Therapist and Registered Nurse Claire Chatman PT, DPT Time Calculation: 40 mins

## 2019-11-27 NOTE — DIABETES MGMT
[facility] Cardiac Surgery Education Note Recommendations/ Comments: Pt is s/p LVAD placement. Had brachial thrombectomy 11/26/19. BG has continued to be relatively stable during LOS. Has required limited amount of correction insulin while in house. Continuing to follow. Current hospital DM medication: lispro correction scale Chart reviewed and initial evaluation complete on Sona Kiser. Patient is a 71 y.o. male with no prior hx. Recent A1c suggestive of new dx. A1c:  
Lab Results Component Value Date/Time Hemoglobin A1c 6.6 (H) 10/25/2019 02:56 PM  
 
 
Assessed and instructed patient on the following:  
· risk of sternal wound infection/ delayed healing  
 - interpretation of lab results, exercise and nutrition Did not provide meter during today's visit given BG stability while in house and well controlled A1c at time of dx. Instead, pt tells me he drinks regular sodas and juices at home so asked him to avoid these at discharge and focused on other lifestyle modifications. Encouraged the following: increased exercise per MD, dietary modifications: plate method, avoidance of SSB, increased protein consumption and use of protein supplements as needed if appetite is low, follow up with PCP for A1c re-check in 3 months. Provided patient with the following: [x]         Survival skills education materials 
             []         Insulin education materials 
             []         CHO counting education materials [x]         BG guidelines for post-op patients [x]         Outpatient DTC contact number 
             []         Glucometer 
             []         Insulin start kit- vial/syringe 
             []         Insulin start kit- pen Recent Glucose Results:  
Lab Results Component Value Date/Time  GLU 95 11/27/2019 04:18 AM  
 GLUCPOC 144 (H) 11/27/2019 11:10 AM  
 GLUCPOC 114 (H) 11/27/2019 06:30 AM  
 GLUCPOC 106 (H) 11/26/2019 09:21 PM  
  
 
Lab Results Component Value Date/Time Creatinine 1.08 11/27/2019 04:18 AM  
 
Estimated Creatinine Clearance: 75.1 mL/min (based on SCr of 1.08 mg/dL). Active Orders Diet DIET REGULAR 3-4 GM (JOSÉ LUIS) PO intake:  
Patient Vitals for the past 72 hrs: 
 % Diet Eaten 11/24/19 1800 80 % Will continue to follow as needed. Thank you. Sushant Ceja RD, Diabetes Clinician Time spent: 12 minutes

## 2019-11-27 NOTE — PROGRESS NOTES
[facility] VAD/Heart Failure Progress Note Admit Date: 10/25/2019 POD:  2 Days Post-Op Procedure:  Procedure(s): LEFT BRACHIAL THROMBECTOMY Subjective:  
Mild dyspnea, fatigue, and weakness; arm feeling better Objective:  
Vitals: 
Blood pressure (!) 74/56, pulse (!) 106, temperature 98.7 °F (37.1 °C), resp. rate 22, height 6' 2\" (1.88 m), weight 196 lb 10.4 oz (89.2 kg), SpO2 96 %. Temp (24hrs), Av.1 °F (36.7 °C), Min:97.6 °F (36.4 °C), Max:98.7 °F (37.1 °C) Hemodynamics: 
 CO: CO (l/min): 5.4 l/min CI: CI (l/min/m2): 2.5 l/min/m2 CVP: CVP (mmHg): 7 mmHg (19 0930) SVR: SVR (dyne*sec)/cm5: 1067 (dyne*sec)/cm5 (19 1200) PAP Systolic: PAP Systolic: 50 (57/05/74 9591) PAP Diastolic: PAP Diastolic: 30 (55/07/44 5566) PVR:   
 SV02: SVO2 (%): 45 % (19 1200) SCV02: SCVO2 (%): 75 % (10/29/19 1900) VAD Interrogation: LVAD (Heartmate) Pump Speed (RPM): 1738 Pump Flow (LPM): 4.5 PI (Pulsitility Index): 3.4 Power: 3.8 MAP: 78  Test: No 
Back Up  at Bedside & Labeled: Yes Power Module Test: No 
Driveline Site Care: No 
Driveline Dressing: Clean, Dry, and Intact EKG/Rhythm:   
 
Extubation Date / Time:  
 
CT Output:  
 
Ventilator: 
Ventilator Volumes Vt Set (ml): 550 ml (19 08) Vt Exhaled (Machine Breath) (ml): 639 ml (19 0826) Vt Spont (ml): 437 ml (19 1035) Ve Observed (l/min): 7.25 l/min (19 1035) Oxygen Therapy: 
Oxygen Therapy O2 Sat (%): 96 % (19 1109) Pulse via Oximetry: 104 beats per minute (19 1400) O2 Device: Nasal cannula (19 043) O2 Flow Rate (L/min): 4 l/min (19 043) FIO2 (%): 50 % (19 1035) CXR: 
 
Admission Weight: Last Weight Weight: 192 lb 10.9 oz (87.4 kg) Weight: 196 lb 10.4 oz (89.2 kg) Intake / Output / Drain: 
Current Shift: No intake/output data recorded.  
Last 24 hrs.:  
 
 Intake/Output Summary (Last 24 hours) at 11/27/2019 1409 Last data filed at 11/27/2019 7971 Gross per 24 hour Intake 1039.16 ml Output 1990 ml Net -950.84 ml No results for input(s): CPK, CKMB, TROIQ in the last 72 hours. Recent Labs  
  11/27/19 0418 11/26/19 
0436 11/25/19 
0416 * 134* 131* K 4.1 4.4 3.4*  3.2*  
CO2 32 33* 32 BUN 20 20 20 CREA 1.08 1.10 1.04  
GLU 95 161* 190* PHOS 3.3 3.5 2.8 MG 2.1 2.1 1.9  1.8 WBC 7.0 7.5 6.0 HGB 8.9* 9.4* 9.5* HCT 28.6* 30.9* 30.8*  194 170 Recent Labs  
  11/27/19 0418 11/26/19 
1659 11/26/19 
1313 11/26/19 
0436 INR 2.0*  --  3.2* 2.4* PTP 19.7*  --  30.1* 22.8* APTT 50.3* 71.1*  --  69.8* No lab exists for component: PBNP Current Facility-Administered Medications:  
  dronabinol (MARINOL) capsule 5 mg, 5 mg, Oral, ACB&D, Polliard, Altagraciaerna Colleen T, NP, 5 mg at 11/27/19 5961   bumetanide (BUMEX) injection 2 mg, 2 mg, IntraVENous, TID, Polliard, Soraay Margueriteisawa, NP, 2 mg at 11/27/19 0229   albumin human 25% (BUMINATE) solution 12.5 g, 12.5 g, IntraVENous, BID, Polliard, Laverna Colleen T, NP, 12.5 g at 11/27/19 0941   digoxin (LANOXIN) tablet 0.25 mg, 0.25 mg, Oral, DAILY, Polliard, Laverna Colleen T, NP, 0.25 mg at 11/27/19 0926 
  potassium chloride SR (KLOR-CON 10) tablet 60 mEq, 60 mEq, Oral, TID, Polliard, Soraya Fujisawa, NP, 60 mEq at 11/27/19 7103   cefepime (MAXIPIME) 2 g in 0.9% sodium chloride (MBP/ADV) 100 mL, 2 g, IntraVENous, Q8H, Rin Herrera NP, Last Rate: 200 mL/hr at 11/27/19 1307, 2 g at 11/27/19 1307 
  0.9% sodium chloride infusion, 3 mL/hr, IntraVENous, CONTINUOUS, Mounika Altman MD, Last Rate: 3 mL/hr at 11/26/19 0847, 3 mL/hr at 11/26/19 0847 
  magnesium oxide (MAG-OX) tablet 400 mg, 400 mg, Oral, BID, Nathan ACUNA NP, 400 mg at 11/27/19 0926 
  oxyCODONE IR (ROXICODONE) tablet 5 mg, 5 mg, Oral, Q6H PRN, Divine Morales MD 
   mirtazapine (REMERON) tablet 15 mg, 15 mg, Oral, QHS, Luis E Ghosh, NP, 15 mg at 11/26/19 2140   balsam peru-castor oil (VENELEX) ointment, , Topical, TID, Juanita Andrews MD 
  tamsulosin [facility]) capsule 0.4 mg, 0.4 mg, Oral, DAILY, Logan Kincaid MD, 0.4 mg at 11/27/19 0926 
  aspirin chewable tablet 81 mg, 81 mg, Oral, DAILY, Levi, Shana B, NP, 81 mg at 11/27/19 0926 
  sildenafil (antihypertensive) (REVATIO) tablet 20 mg, 20 mg, Oral, TID, Levi, Shana B, NP, 20 mg at 11/27/19 0926 
  pantoprazole (PROTONIX) tablet 40 mg, 40 mg, Oral, ACB, Levi, Shana B, NP, 40 mg at 11/27/19 2468   insulin lispro (HUMALOG) injection, , SubCUTAneous, TIDAC, Levi, Shana B, NP, Stopped at 11/21/19 1630 
  insulin lispro (HUMALOG) injection, , SubCUTAneous, AC&HS, Levi, Shana B, NP, 2 Units at 11/27/19 1307   Warfarin MD/NP dosing, , Other, PRN, Mounika Altman MD 
  epoetin yvonne-epbx (RETACRIT) injection 20,000 Units, 20,000 Units, SubCUTAneous, Q7D, Logan Kincaid MD, 20,000 Units at 11/26/19 0721 
  0.9% sodium chloride infusion 250 mL, 250 mL, IntraVENous, PRN, Juanita Andrews MD 
  lidocaine (LIDODERM) 5 % patch 1 Patch, 1 Patch, TransDERmal, Q24H, Shana Sims NP, 1 Patch at 11/26/19 1829 
  0.9% sodium chloride infusion, 9 mL/hr, IntraVENous, CONTINUOUS, Raul Norris MD, Stopped at 11/22/19 1400 
  0.45% sodium chloride infusion, 10 mL/hr, IntraVENous, CONTINUOUS, Raul Norris MD, Stopped at 11/22/19 1736   sodium chloride (NS) flush 5-40 mL, 5-40 mL, IntraVENous, Q8H, Raul Norris MD, 40 mL at 11/27/19 7671   sodium chloride (NS) flush 5-40 mL, 5-40 mL, IntraVENous, PRN, Raul Norris MD 
  acetaminophen (TYLENOL) tablet 650 mg, 650 mg, Oral, Q6H PRN, Raul Norris MD, 650 mg at 11/27/19 0445 
  naloxone (NARCAN) injection 0.4 mg, 0.4 mg, IntraVENous, PRN, Raul Norris MD 
   ondansetron (ZOFRAN) injection 4 mg, 4 mg, IntraVENous, Q4H PRN, Omari Barba MD, 4 mg at 11/20/19 2000 
  albuterol-ipratropium (DUO-NEB) 2.5 MG-0.5 MG/3 ML, 3 mL, Nebulization, Q6H PRN, Omari Barba MD 
  senna-docusate (PERICOLACE) 8.6-50 mg per tablet 1 Tab, 1 Tab, Oral, DAILY, Omari Barba MD, 1 Tab at 11/27/19 6751 
  polyethylene glycol (MIRALAX) packet 17 g, 17 g, Oral, DAILY, Omari Barba MD, Stopped at 11/26/19 0900 
  bisacodyl (DULCOLAX) tablet 5 mg, 5 mg, Oral, DAILY PRN, Omari Barba MD 
  sucralfate (CARAFATE) tablet 1 g, 1 g, Oral, AC&HS, Omari Barba MD, 1 g at 11/27/19 1310 
  0.9% sodium chloride infusion 250 mL, 250 mL, IntraVENous, PRN, Ross Claude, Evelena Knuckles, MD 
  influenza vaccine 2019-20 (6 mos+)(PF) (FLUARIX/FLULAVAL/FLUZONE QUAD) injection 0.5 mL, 0.5 mL, IntraMUSCular, PRIOR TO DISCHARGE, Liza Altman MD 
  hydrALAZINE (APRESOLINE) 20 mg/mL injection 20 mg, 20 mg, IntraVENous, Q6H PRN, Shana Sims NP, 20 mg at 11/19/19 0547 
  dextrose 10 % infusion 125-250 mL, 125-250 mL, IntraVENous, PRN, Mounika Altman MD, Stopped at 11/18/19 0700 
  milrinone (PRIMACOR) 20 MG/100 ML D5W infusion, 0.375 mcg/kg/min, IntraVENous, TITRATE, Mounika Altman MD, Last Rate: 10 mL/hr at 11/27/19 0902, 0.375 mcg/kg/min at 11/27/19 1855   insulin regular (NOVOLIN R, HUMULIN R) 100 Units in 0.9% sodium chloride 100 mL infusion, 1-50 Units/hr, IntraVENous, TITRATE, Andrés Herrera NP, Stopped at 11/21/19 1002   ELECTROLYTE REPLACEMENT NOTE: Nurse to review Serum Potassium and Magnesuim levels and Initiate Electrolyte Replacement Protocol as needed, 1 Each, Other, PRN, Andrés Herrera, NP 
 
A/P 
 
S/P LVAD - good flows Need for anti-coagulation - coumadin DM - insulin RV dysfunction - milrinone Risk of morbidity and mortality - high Medical decision making - high complexity Signed By: Nhi Houston MD

## 2019-11-27 NOTE — PROGRESS NOTES
Vascular: 
 
Looks good, left arm incision OK, hand well perfused Stable post left brachial thrombectomy - will see PRN - call if questions

## 2019-11-27 NOTE — WOUND CARE
WOCN Note:  
 
 
New consult placed by RN for pressure injury left hand. No pressure injury left hand: patient had a left brachial thrombectomy. Chart shows: 
Admitted for acute decompensated heart failure; history of AF, CHF, PVD 
WBC = 7.0 On = 11-27-19 Admitted from home. Assessment: wife and son at bedside. Patient is A&O x 3, communicative, continent and mobile. Assists in repositioning and is able to turn independently for assessment. Patient continent. Bed: Versa Care Bed Diet: regular with snacks Patient reports no pain / congested cough. Bilateral heels, buttocks and sacral skin intact and without erythema. Heels offloaded on pillow. Left arm: post left brachial thrombectomy: hand is warm and well perfused. Moving hand and bending fingers. Lower arm and hand bruised and swollen. Note: dry scab to left thumb. Intact and no drainage. Removed mepitel border dressing to mid lower left arm. Dressing with no wound, dry area and no weeping. Recommendations:   
- no wound care orders. Minimize layers of linen/pads under patient to optimize support surface. Turn/reposition approximately every 2 hours and offload heels. Patient is continent. Specialty bed:Versa Care Bed Transition of Care: Plan to follow weekly and as needed while admitted to hospital.  
 
Misty KILGORE RN Wound Care Department Office: [phone] Pager: [phone]

## 2019-11-28 NOTE — PROGRESS NOTES
[facility] 
 [address] [address], [address] [facility] Phone: [phone]   [phone]   
  
Nephrology Progress Note Sona Kiser     1950     [id number] Date of Admission : 10/25/2019 
11/28/19 CC:  Follow up for JUAN J, CKD, Hyponatremia Assessment and Plan JUAN J on CKD 
- 2/2 CRS and urinary retention - Cr stable 
- cont current diuretics - keep neal for now 
- daily labs for now Hyponatremia: 
- stable 
- from CHF 
- cont diuresis 
  
Gross hematuria, BPH w/ retention: 
- off CBI pre VAD. cysto pre op showed catheter related Cystitis @ postr wall  
- neal had to be replaced yesterday due to urinary retention 
- keep neal in for now 
- on flomax Acute on Chronic HFrEF  
- EF 16-20%, NYHA Class IV , hx of VF arrest - s/p AICD 
- Impella insertion 10/29- removed 11/18  
- s/p LVAD placement on 11/18 CKD Stage III  
- Mild Left renal atrophy at baseline Hoarseness, vocal cord paralysis, mediastinal adenopathy: 
- will need eventual PET/CT 
  
L arm clot s/p thrombectomy on 11/25 JOSE on CPAP  
  
PAF s/p DCCV 6/19 
  
Anemia: 
- from blood loss s/p multiple blood products - s/p IV iron,  Repeat iron studies ok 
- on PAVEL q7 d Serratia and Enteroccocus UTI: 
- completed abx Interval History:   
Seen and examined. Feeling better. Up eating breakfast.  Cr and Na stable. Voiding well. Neal in place. No cp or sob reported. Review of Systems: Pertinent items are noted in HPI. Current Medications:  
Current Facility-Administered Medications Medication Dose Route Frequency  dronabinol (MARINOL) capsule 5 mg  5 mg Oral ACB&D  
 bumetanide (BUMEX) injection 2 mg  2 mg IntraVENous TID  albumin human 25% (BUMINATE) solution 12.5 g  12.5 g IntraVENous BID  digoxin (LANOXIN) tablet 0.25 mg  0.25 mg Oral DAILY  potassium chloride SR (KLOR-CON 10) tablet 60 mEq  60 mEq Oral TID  cefepime (MAXIPIME) 2 g in 0.9% sodium chloride (MBP/ADV) 100 mL  2 g IntraVENous Q8H  
 magnesium oxide (MAG-OX) tablet 400 mg  400 mg Oral BID  
 oxyCODONE IR (ROXICODONE) tablet 5 mg  5 mg Oral Q6H PRN  mirtazapine (REMERON) tablet 15 mg  15 mg Oral QHS  balsam peru-castor oil (VENELEX) ointment   Topical TID  tamsulosin (FLOMAX) capsule 0.4 mg  0.4 mg Oral DAILY  aspirin chewable tablet 81 mg  81 mg Oral DAILY  sildenafil (antihypertensive) (REVATIO) tablet 20 mg  20 mg Oral TID  pantoprazole (PROTONIX) tablet 40 mg  40 mg Oral ACB  insulin lispro (HUMALOG) injection   SubCUTAneous TIDAC  insulin lispro (HUMALOG) injection   SubCUTAneous AC&HS  Warfarin MD/NP dosing   Other PRN  
 epoetin yvonne-epbx (RETACRIT) injection 20,000 Units  20,000 Units SubCUTAneous Q7D  
 lidocaine (LIDODERM) 5 % patch 1 Patch  1 Patch TransDERmal Q24H  
 0.45% sodium chloride infusion  10 mL/hr IntraVENous CONTINUOUS  
 sodium chloride (NS) flush 5-40 mL  5-40 mL IntraVENous Q8H  
 sodium chloride (NS) flush 5-40 mL  5-40 mL IntraVENous PRN  
 acetaminophen (TYLENOL) tablet 650 mg  650 mg Oral Q6H PRN  
 naloxone (NARCAN) injection 0.4 mg  0.4 mg IntraVENous PRN  
 ondansetron (ZOFRAN) injection 4 mg  4 mg IntraVENous Q4H PRN  
 albuterol-ipratropium (DUO-NEB) 2.5 MG-0.5 MG/3 ML  3 mL Nebulization Q6H PRN  
 senna-docusate (PERICOLACE) 8.6-50 mg per tablet 1 Tab  1 Tab Oral DAILY  polyethylene glycol (MIRALAX) packet 17 g  17 g Oral DAILY  bisacodyl (DULCOLAX) tablet 5 mg  5 mg Oral DAILY PRN  
 sucralfate (CARAFATE) tablet 1 g  1 g Oral AC&HS  influenza vaccine 2019-20 (6 mos+)(PF) (FLUARIX/FLULAVAL/FLUZONE QUAD) injection 0.5 mL  0.5 mL IntraMUSCular PRIOR TO DISCHARGE  hydrALAZINE (APRESOLINE) 20 mg/mL injection 20 mg  20 mg IntraVENous Q6H PRN  
 dextrose 10 % infusion 125-250 mL  125-250 mL IntraVENous PRN  
 milrinone (PRIMACOR) 20 MG/100 ML D5W infusion  0.375 mcg/kg/min IntraVENous TITRATE No Known Allergies Objective: 
Vitals:   
Vitals:  
 11/27/19 2100 11/27/19 2355 11/28/19 0500 11/28/19 3457 BP:      
Pulse: 98 96 97 (!) 101 Resp: 18 20 18 18 Temp:  98.3 °F (36.8 °C) 98.4 °F (36.9 °C) 97.4 °F (36.3 °C) SpO2:  99% 98% 99% Weight:   87.2 kg (192 lb 3.9 oz) Height:      
 
Intake and Output: 
No intake/output data recorded. 11/26 1901 - 11/28 0700 In: 1440 [P.O.:1440] Out: 7003 [Urine:4750; Drains:90] Physical Examination: 
 
General: Awake and alert Neck:  Lines + Resp:  Decreased bibasilar breath sounds CV:  VADsounds  2-3+ b/l LE edema GI:  Soft, NT, + Bowel sounds Neurologic:  AAO X3  
:  No neal [x]    High complexity decision making was performed 
[x]    Patient is at high-risk of decompensation with multiple organ involvement Lab Data Personally Reviewed: I have reviewed all the pertinent labs, microbiology data and radiology studies during assessment. Recent Labs  
  11/28/19 
0525 11/27/19 0418 11/26/19 
1313 11/26/19 
7106 * 134*  --  134* K 4.1 4.1  --  4.4 CL 95* 96*  --  97  
CO2 32 32  --  33* * 95  --  161* BUN 16 20  --  20  
CREA 1.12 1.08  --  1.10 CA 8.4* 8.3*  --  8.6 MG 2.0 2.1  --  2.1 PHOS  --  3.3  --  3.5 ALB 2.2* 2.2*  --  2.1*  
SGOT 13* 10*  --  11* ALT <6* <6*  --  <6* INR 1.9* 2.0* 3.2* 2.4* Recent Labs  
  11/28/19 
0525 11/27/19 
0418 11/26/19 
0436 WBC 7.1 7.0 7.5 HGB 8.5* 8.9* 9.4* HCT 27.9* 28.6* 30.9*  222 194 No results found for: SDES Lab Results Component Value Date/Time  Culture result: PENDING 11/27/2019 11:10 AM  
 Culture result: NO GROWTH 5 DAYS 11/12/2019 11:18 AM  
 Culture result: SERRATIA MARCESCENS (A) 10/31/2019 10:05 AM  
 Culture result: SERRATIA MARCESCENS (2ND COLONY TYPE/STRAIN) (A) 10/31/2019 10:05 AM  
 Culture result: ENTEROCOCCUS FAECALIS GROUP D (A) 10/31/2019 10:05 AM  
 
 Recent Results (from the past 24 hour(s)) GLUCOSE, POC Collection Time: 11/27/19 11:10 AM  
Result Value Ref Range Glucose (POC) 144 (H) 65 - 100 mg/dL Performed by Cailin Soriano CULTURE, RESPIRATORY/SPUTUM/BRONCH W GRAM STAIN Collection Time: 11/27/19 11:10 AM  
Result Value Ref Range Special Requests: NO SPECIAL REQUESTS    
 GRAM STAIN FEW WBCS SEEN    
 GRAM STAIN NO EPITHELIAL CELLS SEEN    
 GRAM STAIN NO DEFINITE ORGANISM SEEN Culture result: PENDING   
GLUCOSE, POC Collection Time: 11/27/19  4:49 PM  
Result Value Ref Range Glucose (POC) 116 (H) 65 - 100 mg/dL Performed by Cailin Soriano GLUCOSE, POC Collection Time: 11/27/19  9:47 PM  
Result Value Ref Range Glucose (POC) 103 (H) 65 - 100 mg/dL Performed by Nortal AS Glass METABOLIC PANEL, COMPREHENSIVE Collection Time: 11/28/19  5:25 AM  
Result Value Ref Range Sodium 132 (L) 136 - 145 mmol/L Potassium 4.1 3.5 - 5.1 mmol/L Chloride 95 (L) 97 - 108 mmol/L  
 CO2 32 21 - 32 mmol/L Anion gap 5 5 - 15 mmol/L Glucose 135 (H) 65 - 100 mg/dL BUN 16 6 - 20 MG/DL Creatinine 1.12 0.70 - 1.30 MG/DL  
 BUN/Creatinine ratio 14 12 - 20 GFR est AA >60 >60 ml/min/1.73m2 GFR est non-AA >60 >60 ml/min/1.73m2 Calcium 8.4 (L) 8.5 - 10.1 MG/DL Bilirubin, total 1.1 (H) 0.2 - 1.0 MG/DL  
 ALT (SGPT) <6 (L) 12 - 78 U/L  
 AST (SGOT) 13 (L) 15 - 37 U/L Alk. phosphatase 96 45 - 117 U/L Protein, total 5.5 (L) 6.4 - 8.2 g/dL Albumin 2.2 (L) 3.5 - 5.0 g/dL Globulin 3.3 2.0 - 4.0 g/dL A-G Ratio 0.7 (L) 1.1 - 2.2 MAGNESIUM Collection Time: 11/28/19  5:25 AM  
Result Value Ref Range Magnesium 2.0 1.6 - 2.4 mg/dL LACTIC ACID Collection Time: 11/28/19  5:25 AM  
Result Value Ref Range Lactic acid 0.9 0.4 - 2.0 MMOL/L  
CBC W/O DIFF Collection Time: 11/28/19  5:25 AM  
Result Value Ref Range WBC 7.1 4.1 - 11.1 K/uL  
 RBC 2.83 (L) 4.10 - 5.70 M/uL HGB 8.5 (L) 12.1 - 17.0 g/dL HCT 27.9 (L) 36.6 - 50.3 % MCV 98.6 80.0 - 99.0 FL  
 MCH 30.0 26.0 - 34.0 PG  
 MCHC 30.5 30.0 - 36.5 g/dL  
 RDW 18.8 (H) 11.5 - 14.5 % PLATELET 638 973 - 363 K/uL MPV 9.2 8.9 - 12.9 FL  
 NRBC 0.0 0  WBC ABSOLUTE NRBC 0.00 0.00 - 0.01 K/uL PROTHROMBIN TIME + INR Collection Time: 11/28/19  5:25 AM  
Result Value Ref Range INR 1.9 (H) 0.9 - 1.1 Prothrombin time 18.8 (H) 9.0 - 11.1 sec PTT Collection Time: 11/28/19  5:25 AM  
Result Value Ref Range aPTT 36.3 (H) 22.1 - 32.0 sec  
 aPTT, therapeutic range     58.0 - 77.0 SECS  
DIGOXIN Collection Time: 11/28/19  5:25 AM  
Result Value Ref Range Digoxin level 1.0 0.90 - 2.00 NG/ML  
LD Collection Time: 11/28/19  5:25 AM  
Result Value Ref Range  85 - 241 U/L  
NT-PRO BNP Collection Time: 11/28/19  5:25 AM  
Result Value Ref Range NT pro-BNP 11,461 (H) <125 PG/ML  
GLUCOSE, POC Collection Time: 11/28/19  6:31 AM  
Result Value Ref Range Glucose (POC) 96 65 - 100 mg/dL Performed by Ezio Clubs Total time spent with patient:  xxx   min. Care Plan discussed with: 
Patient Family RN Consulting Physician [address] [facility],      
 
I have reviewed the flowsheets. Chart and Pertinent Notes have been reviewed. No change in PMH ,family and social history from Consult note.  
 
 
Dominique Darden MD

## 2019-11-28 NOTE — PROGRESS NOTES
[facility] VAD/Heart Failure Progress Note Admit Date: 10/25/2019 POD:  3 Days Post-Op Procedure:  Procedure(s): LEFT BRACHIAL THROMBECTOMY Subjective:  
Mild dyspnea, fatigue, and weakness; sleepy; chest tubes removed Objective:  
Vitals: 
Blood pressure (!) 74/56, pulse 93, temperature 98.3 °F (36.8 °C), resp. rate 18, height 6' 2\" (1.88 m), weight 192 lb 3.9 oz (87.2 kg), SpO2 95 %. Temp (24hrs), Av.3 °F (36.8 °C), Min:97.4 °F (36.3 °C), Max:99 °F (37.2 °C) Hemodynamics: 
 CO: CO (l/min): 5.4 l/min CI: CI (l/min/m2): 2.5 l/min/m2 CVP: CVP (mmHg): 7 mmHg (19 0930) SVR: SVR (dyne*sec)/cm5: 1067 (dyne*sec)/cm5 (19 1200) PAP Systolic: PAP Systolic: 50 (72/48/91 9564) PAP Diastolic: PAP Diastolic: 30 (7) PVR:   
 SV02: SVO2 (%): 45 % (19 1200) SCV02: SCVO2 (%): 75 % (10/29/19 1900) VAD Interrogation: LVAD (Heartmate) Pump Speed (RPM): 5200 Pump Flow (LPM): 4 PI (Pulsitility Index): 3.7 Power: 3.3 MAP: 76  Test: Yes 
Back Up  at Bedside & Labeled: Yes Power Module Test: Yes Driveline Site Care: No 
Driveline Dressing: Clean, Dry, and Intact EKG/Rhythm:   
 
Extubation Date / Time:  
 
CT Output:  
 
Ventilator: 
Ventilator Volumes Vt Set (ml): 550 ml (19 08) Vt Exhaled (Machine Breath) (ml): 639 ml (19 0826) Vt Spont (ml): 437 ml (19 1035) Ve Observed (l/min): 7.25 l/min (19 1035) Oxygen Therapy: 
Oxygen Therapy O2 Sat (%): 95 % (19) Pulse via Oximetry: 104 beats per minute (19 1400) O2 Device: Nasal cannula (19) O2 Flow Rate (L/min): 4 l/min (19) FIO2 (%): 50 % (19 103) CXR: 
 
Admission Weight: Last Weight Weight: 192 lb 10.9 oz (87.4 kg) Weight: 192 lb 3.9 oz (87.2 kg) Intake / Output / Drain: 
Current Shift:  07 -  190 In: 382.2 [P.O.:240; I.V.:142.2] Out: 10 [Drains:10] Last 24 hrs.:  
 
Intake/Output Summary (Last 24 hours) at 11/28/2019 1544 Last data filed at 11/28/2019 1305 Gross per 24 hour Intake 1456.67 ml Output 2820 ml Net -1363.33 ml No results for input(s): CPK, CKMB, TROIQ in the last 72 hours. Recent Labs  
  11/28/19 
0525 11/27/19 
0418 11/26/19 
0436 * 134* 134* K 4.1 4.1 4.4  
CO2 32 32 33* BUN 16 20 20 CREA 1.12 1.08 1.10 * 95 161* PHOS  --  3.3 3.5 MG 2.0 2.1 2.1 WBC 7.1 7.0 7.5 HGB 8.5* 8.9* 9.4* HCT 27.9* 28.6* 30.9*  222 194 Recent Labs  
  11/28/19 
0525 11/27/19 
0418 11/26/19 
1659 11/26/19 
1313 INR 1.9* 2.0*  --  3.2* PTP 18.8* 19.7*  --  30.1* APTT 36.3* 50.3* 71.1*  -- No lab exists for component: PBNP Current Facility-Administered Medications:  
  warfarin (COUMADIN) tablet 5 mg, 5 mg, Oral, ONCE, Nael Gonzalez MD 
  dronabinol (MARINOL) capsule 5 mg, 5 mg, Oral, ACB&D, Katelynnd, Genelle November T, NP, 5 mg at 11/28/19 2086   bumetanide (BUMEX) injection 2 mg, 2 mg, IntraVENous, TID, Ceciliaiard, Genelle November T, NP, 2 mg at 11/28/19 4606   albumin human 25% (BUMINATE) solution 12.5 g, 12.5 g, IntraVENous, BID, Ceciliaiard, Genelle November T, NP, 12.5 g at 11/28/19 7264   digoxin (LANOXIN) tablet 0.25 mg, 0.25 mg, Oral, DAILY, Katelynnd, Genelle November T, NP, 0.25 mg at 11/28/19 9731   potassium chloride SR (KLOR-CON 10) tablet 60 mEq, 60 mEq, Oral, TID, Genesis Herrera NP, 60 mEq at 11/28/19 5668   cefepime (MAXIPIME) 2 g in 0.9% sodium chloride (MBP/ADV) 100 mL, 2 g, IntraVENous, Q8H, Rin Herrera NP, Last Rate: 200 mL/hr at 11/28/19 1115, 2 g at 11/28/19 1115 
  magnesium oxide (MAG-OX) tablet 400 mg, 400 mg, Oral, BID, Albert Woods, NP, 400 mg at 11/28/19 2296   oxyCODONE IR (ROXICODONE) tablet 5 mg, 5 mg, Oral, Q6H PRN, Esa Giron MD 
  mirtazapine (REMERON) tablet 15 mg, 15 mg, Oral, QHS, Albert Woods, NP, 15 mg at 11/27/19 2130   balsam peru-castor oil (VENELEX) ointment, , Topical, TID, Rusty Andrews MD 
  tamsulosin [facility]) capsule 0.4 mg, 0.4 mg, Oral, DAILY, Logan Kincaid MD, 0.4 mg at 11/28/19 1901   aspirin chewable tablet 81 mg, 81 mg, Oral, DAILY, Levi, Shana B, NP, 81 mg at 11/28/19 8679   sildenafil (antihypertensive) (REVATIO) tablet 20 mg, 20 mg, Oral, TID, Levi, Shana B, NP, 20 mg at 11/28/19 0823 
  pantoprazole (PROTONIX) tablet 40 mg, 40 mg, Oral, ACB, Levi, Shana B, NP, 40 mg at 11/28/19 0720 
  insulin lispro (HUMALOG) injection, , SubCUTAneous, AC&HS, Levi, Shana B, NP, Stopped at 11/27/19 1630   Warfarin MD/NP dosing, , Other, PRN, Mounika Altman MD 
  epoetin yvonne-epbx (RETACRIT) injection 20,000 Units, 20,000 Units, SubCUTAneous, Q7D, Esperanza Osborne MD, 20,000 Units at 11/26/19 0540   lidocaine (LIDODERM) 5 % patch 1 Patch, 1 Patch, TransDERmal, Q24H, Levi, Shana B, NP, 1 Patch at 11/27/19 1916 
  0.45% sodium chloride infusion, 10 mL/hr, IntraVENous, CONTINUOUS, Kathie Bermudez MD, Stopped at 11/22/19 1736   sodium chloride (NS) flush 5-40 mL, 5-40 mL, IntraVENous, Q8H, Mari PAULINO MD, 10 mL at 11/28/19 1503   sodium chloride (NS) flush 5-40 mL, 5-40 mL, IntraVENous, PRN, Kathie Bermudez MD 
  acetaminophen (TYLENOL) tablet 650 mg, 650 mg, Oral, Q6H PRN, Kathie Bermudez MD, 650 mg at 11/27/19 2130 
  naloxone (NARCAN) injection 0.4 mg, 0.4 mg, IntraVENous, PRN, Kathie Bermudez MD 
  ondansetron [facility]) injection 4 mg, 4 mg, IntraVENous, Q4H PRN, Kathie Bermudez MD, 4 mg at 11/20/19 2000 
  albuterol-ipratropium (DUO-NEB) 2.5 MG-0.5 MG/3 ML, 3 mL, Nebulization, Q6H PRN, Kathie Bermudez MD 
  senna-docusate (PERICOLACE) 8.6-50 mg per tablet 1 Tab, 1 Tab, Oral, DAILY, Kathie Bermudez MD, 1 Tab at 11/27/19 0926 
  polyethylene glycol (MIRALAX) packet 17 g, 17 g, Oral, DAILY, Kathie Bermudez MD, Stopped at 11/26/19 0900   bisacodyl (DULCOLAX) tablet 5 mg, 5 mg, Oral, DAILY PRN, Jami Orta MD 
  sucralfate (CARAFATE) tablet 1 g, 1 g, Oral, AC&HS, Jami Orta MD, 1 g at 11/28/19 1205   influenza vaccine 2019-20 (6 mos+)(PF) (FLUARIX/FLULAVAL/FLUZONE QUAD) injection 0.5 mL, 0.5 mL, IntraMUSCular, PRIOR TO DISCHARGE, Mounika Altman MD 
  hydrALAZINE (APRESOLINE) 20 mg/mL injection 20 mg, 20 mg, IntraVENous, Q6H PRN, Shana Sims NP, 20 mg at 11/19/19 0547 
  dextrose 10 % infusion 125-250 mL, 125-250 mL, IntraVENous, PRN, Mounika Altman MD, Stopped at 11/18/19 0700 
  milrinone (PRIMACOR) 20 MG/100 ML D5W infusion, 0.375 mcg/kg/min, IntraVENous, TITRATE, Mounika Altman MD, Last Rate: 10 mL/hr at 11/28/19 1502, 0.375 mcg/kg/min at 11/28/19 1502 
 
  
A/P 
  
S/P LVAD - good flows Need for anti-coagulation - coumadin DM - insulin RV dysfunction - milrinone 
  
Risk of morbidity and mortality - high Medical decision making - high complexity Signed By: Consuelo Barajas MD

## 2019-11-28 NOTE — PROGRESS NOTES
Patient had an uneventful shift and slept well. Right arm has decreased swelling but increased redness. Pancho Franklin made aware. Bedside and Verbal shift change report given to Brittany Aj (oncoming nurse) by Curt Ward (offgoing nurse). Report included the following information SBAR, Kardex, OR Summary, Procedure Summary, Intake/Output, MAR and Cardiac Rhythm NSR/ST.

## 2019-11-28 NOTE — PROGRESS NOTES
[id number] [facility] [place] [address] [facility] in [place], [place] Inpatient Progress Note Patient name: Corby David Patient : 1950 Patient MRN: [id number] Attending MD: Devin Eller MD 
Date of service: 19 Chief Complaint:  
Cardiogenic Shock  
  
HPI: Sona Kiser is a 71y.o. year old pleasant white male with a history of HTN, HLD, JOSE, CAD s/p cardiac arrest VFib s/p CABG () c/b sternal would infection and sternectomy, ischemic cardiomyopathy LVEF 15-20%, s/p ICD and with LBBB. He was admitted with acute on chronic systolic heart failure with massive volume overload > 20 lbs, in the setting of atrial fibrillation s/p failed DCCV and underwent DCCV and RHC on .  S/p BiVICD on 19 with Dr. Pennie Noriega. He was discharged home home on IV milrinone on 19. Mathew Boast has been followed closely by Dr. Jhonatan Meyers and the [facility]. 
  
Mr. Kiser was admitted for acute on chronic systolic heart failure. He underwent implantation of Impella 5.0 due to CS and has completed his LVAD evaluation. Mathew Boast meets criteria for implant of HM3 as DT. He was NYHA Class IV prior to implant of Impella 5.0, has LVEF < 15%, was intolerant of GDMT due to symptomatic hypotension and renal dysfunction. He remains dependent on temporary MCS support. RHC  revealed compensated hemodynamics on Impella. His renal function has improved dramatically with improvement in his hemodynamics. He is not a suitable transplant candidate due to single kidney, sternectomy, debility, and frailty. He was reviewed by Aneta Nicole and felt to be a high risk heart transplant candidate due to multiple co-morbidities as well as the afore mentioned conditions.  He remained in the CCU and underwent a HM 3 implant as DT on .  
  
24Hr Events S/p left arm thrombectomy Cr stable Adequate diuresis Pain improving  
  
Procedure:  Procedure(s): REMOVAL TEMPORARY LEFT VENTRICULAR ASSIST DEVICE, IMPLANTATION OF LEFT VENTRICULAR ASSIST DEVICE PERMANENT (VAD), ECC, JACQUE, EPI AORTIC US BY DR Roseann Albarran. POD:  10 
  
  
Impression / Plan:  
Heart Failure Status: NYHA Class IV INTERMACS Category 2 
  
Chronic systolic heart failure due to ICM, NYHA Class IV, EF < 15%, cardiogenic shock bridged with Impella 5.0 support to HM 3 implant S/p Impella removal and LVAD implant Continue milrinone 0.375mcg/kg/min for now Cont Sildenafil - will need PA 
 IV Bumex to 2 mg TID  + 25% albumin BID No AA/ARB/ARNI post op- will start as appropriate Strict I/O, daily weights, Na+ restricted diet Continue LVAD education Daily dressing changes  
  
Anticoagulation for LVAD, INR goal 2-3 Continue warfarin, ASA 
D/C bivalirudin - INR 2.0 Con coumadin tonight 5 mg 
  
Acute Respiratory Failure post op Resolved Pulmonary hygiene Wean NC for sats > 92% Cont home CPAP - wife bringing back up mask  
  
Post op Pain PRN oxycodone Comfort measures  
  
L Brachial Artery Thrombosis s/p thrombectomy Surgical management per Dr. Jean Paul Pickett On bivalrudin and warfarin Pain improved  
  
Swelling, pain of right arm, acute Doppler studies (-) for thrombus Continue AC Confirmed PICC placement 
  
CKD 3, atrophic left kidney Appreciate Nephrology assistance Assess diuretic dosing daily IV bumex Avoid nephrotoxins Trend labs  
  
CAD s/p CABG Cont low dose ASA- watch platelets No BB d/t RV failure Hold statin due to recent hepatic failure [facility] performed 11/13 - low likelihood of benefit from revascularization  
  
Hx of VF arrest s/p BiV-ICD No recent shocks Keep K > 4 and Mg > 2  
   
PAF Currently in ST, Paced Cont PO Amio Digoxin to 25 mcg daily Check EKG today to assess rhythm and QTc 
  
Hypokalemia Klor Con to 60 mEq TID PO  
  
Hx of gout Uric acid WNL 
  
Suspected aspiration pneumonia RUL consolidation Suspect aspiration related to over sedation Limit sedatives Sputum culture Cefepime 2g q8h x 7 days total 
   
Urinary retention, c/b serratia UTI and hematuria Appreciate Urology consult Cystoscopy performed 11/13 shows catheter induced cystitis Repeat UA shows resolution of UTI  
  
Malnutrition Appreciate Nutritionist consult Prealbumin down to 7.6 Continue Marinol 5 mg PO BID 
6 small meals daily Continue mirtazapine - watch Na+ 
  
COPD Appreciate Pulmonology assistance Continue nebs PRN   
PFTs complete 
  
Anemia Multifactorial, due to hemolysis + epistaxis + hematuria Intolerant of octreotide or doxycycline for AVM ppx due to prolonged QTc Transfuse for Hgb goal > 8 
  
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  
  
Hx of sternal wound infection, sternectomy Sternum closed post op- monitor  
  
Vocal cord paralysis Improved Speech therapy following May need MBS 
  
Debility Continue PT/OT  
  
Ppx Protonix PT/OT Will continue cefepime for 2 weeks post op per Dr. Evelyne Lemus for Impella prophylaxis Bowel regimen Cont Mechanical soft PICC placed 
  
Dispo: 
Likely will need IP rehab LVAD INTERROGATION: 
Device interrogated in person Device function normal, normal flow, no events LVAD Pump Speed (RPM): 5200 Pump Flow (LPM): 4.1 MAP: (P) 78 PI (Pulsitility Index): 3.3 Power: 3.6  Test: Yes 
Back Up  at Bedside & Labeled: Yes Power Module Test: Yes Driveline Site Care: No 
Driveline Dressing: Clean, Dry, and Intact Outpatient: No 
MAP in Therapeutic Range (Outpatient): Yes Testing Alarms Reviewed: Yes 
Back up SC speed: 5200 Back up Low Speed Limit: 4800 Emergency Equipment with Patient?: Yes Emergency procedures reviewed?: Yes Drive line site inspected?: Yes Drive line intergrity inspected?: Yes Drive line dressing changed?: No 
 
PHYSICAL EXAM: 
Visit Vitals BP (!) 74/56 Pulse 94 Temp 98.1 °F (36.7 °C) Resp 18 Ht 6' 2\" (1.88 m) Wt 192 lb 3.9 oz (87.2 kg) SpO2 96% BMI 24.68 kg/m² General: Patient is well developed, well-nourished in no acute distress HEENT: Normocephalic and atraumatic. No scleral icterus. Pupils are equal, round and reactive to light and accomodation. No conjunctival injection. Oropharynx is clear. Neck: Supple. No evidence of thyroid enlargements or lymphadenopathy. JVD: Cannot be appreciated Lungs: Breath sounds are equal and clear bilaterally. No wheezes, rhonchi, or rales. Heart: Regular rate and rhythm with normal S1 and S2. No murmurs, gallops or rubs. Abdomen: Soft, no mass or tenderness. No organomegaly or hernia. Bowel sounds present. Genitourinary and rectal: deferred Extremities: No cyanosis, clubbing, or edema. Neurologic: No focal sensory or motor deficits are noted. Grossly intact. Psychiatric: Awake, alert an doriented x 3. Appropriate mood and affect. Skin: Warm, dry and well perfused. No lesions, nodules or rashes are noted. REVIEW OF SYSTEMS: 
General: Denies fever, night sweats. Ear, nose and throat: Denies difficulty hearing, sinus problems, runny nose, post-nasal drip, ringing in ears, mouth sores, loose teeth, ear pain, nosebleeds, sore throate, facial pain or numbess Cardiovascular: see above in the interval history Respiratory: Denies cough, wheezing, sputum production, hemoptysis. Gastrointestinal: Denies heartburn, constipation, intolerance to certain foods, diarrhea, abdominal pain, nausea, vomiting, difficulty swallowing, blood in stool Kidney and bladder: Denies painful urination, frequent urination, urgency, prostate problems and impotence Musculoskeletal: Denies joint pain, muscle weakness Skin and hair: Denies change in existing skin lesions, hair loss or increase, breast changes PAST MEDICAL HISTORY: 
Past Medical History:  
Diagnosis Date  Degenerative disc disease, lumbar  Heart failure ([address]  High cholesterol  Hypertension  Paroxysmal atrial fibrillation ([address] 4/2/2019  Spinal stenosis PAST SURGICAL HISTORY: 
Past Surgical History:  
Procedure Laterality Date  COLONOSCOPY Left 2019 COLONOSCOPY at bedside performed by Christopher Villegas MD at [address]  HX CORONARY ARTERY BYPASS GRAFT    
 triple  HX HERNIA REPAIR    
 HX IMPLANTABLE CARDIOVERTER DEFIBRILLATOR  OR CARDIOVERSION ELECTIVE ARRHYTHMIA EXTERNAL N/A 6/10/2019 EP CARDIOVERSION performed by Beulah Garcia MD at [address] CATH LAB  OR CARDIOVERSION ELECTIVE ARRHYTHMIA EXTERNAL N/A 2019 EP CARDIOVERSION performed by Liza Evangelista MD at [address] [facility] [address] CATH LAB  OR INSJ/RPLCMT PERM DFB W/TRNSVNS LDS 1/DUAL CHMBR N/A 2019 INSERT ICD BIV MULTI performed by Imelda Wallace MD at [address] [address], [facility] CATH LAB  OR TCAT IMPL WRLS P-ART PRS SNR L-T HEMODYN MNTR N/A 2019 IMPLANT HEART FAILURE MONITORING DEVICE performed by Saundra Camara MD at [address] [place] [address], [address] CATH LAB FAMILY HISTORY: 
Family History Problem Relation Age of Onset  Lung Disease Mother  Hypertension Mother [facility] Arthritis-osteo Mother  Heart Disease Mother  Heart Disease Father  Diabetes Father SOCIAL HISTORY: 
Social History Socioeconomic History  Marital status:  Spouse name: Not on file  Number of children: Not on file  Years of education: Not on file  Highest education level: Not on file Tobacco Use  Smoking status: Former Smoker Last attempt to quit: 2010 Years since quittin.9  Smokeless tobacco: Never Used Substance and Sexual Activity  Alcohol use: Not Currently Comment: rarely  Drug use: Never Other Topics Concern LABORATORY RESULTS: 
  
Labs Latest Ref Rng & Units 2019 WBC 4.1 - 11.1 K/uL 7.1 7.0 7.5 - - 6.0 -  
RBC 4.10 - 5.70 M/uL 2.83(L) 2.91(L) 3.13(L) - - 3.15(L) -  
 Hemoglobin 12.1 - 17.0 g/dL 8.5(L) 8. 9(L) 9.4(L) - - 9. 5(L) - Hematocrit 36.6 - 50.3 % 27. 9(L) 28. 6(L) 30. 9(L) - - 30. 8(L) -  
MCV 80.0 - 99.0 FL 98.6 98.3 98.7 - - 97.8 - Platelets 765 - 858 K/uL 216 222 194 - - 170 - Lymphocytes 12 - 49 % - - - - - - - Monocytes 5 - 13 % - - - - - - - Eosinophils 0 - 7 % - - - - - - - Basophils 0 - 1 % - - - - - - - Albumin 3.5 - 5.0 g/dL 2. 2(L) 2. 2(L) 2. 1(L) - - 2. 0(L) -  
Calcium 8.5 - 10.1 MG/DL 8.4(L) 8. 3(L) 8.6 - - 7. 9(L) -  
SGOT 15 - 37 U/L 13(L) 10(L) 11(L) - - 14(L) -  
Glucose 65 - 100 mg/dL 135(H) 95 161(H) - - 190(H) -  
BUN 6 - 20 MG/DL 16 20 20 - - 20 -  
Creatinine 0.70 - 1.30 MG/DL 1.12 1.08 1.10 - - 1.04 - Sodium 136 - 145 mmol/L 132(L) 134(L) 134(L) - - 131(L) - Potassium 3.5 - 5.1 mmol/L 4.1 4.1 4.4 - 3. 4(L) 3. 2(L) 3.5 TSH 0.36 - 3.74 uIU/mL - - - - - - -  
PSA 0.01 - 4.0 ng/mL - - - - - - -  
LDH 85 - 241 U/L 218 230 235 258(H) - - -  
CEA ng/mL - - - - - - - Some recent data might be hidden Lab Results Component Value Date/Time TSH 2.40 10/25/2019 07:39 PM  
 TSH 2.45 06/01/2019 04:16 AM  
 
 
ALLERGY: 
No Known Allergies CURRENT MEDICATIONS: 
 
Current Facility-Administered Medications:  
  dronabinol (MARINOL) capsule 5 mg, 5 mg, Oral, ACB&D, Polliard, Jacolyn Savers T, NP, 5 mg at 11/28/19 0720   bumetanide (BUMEX) injection 2 mg, 2 mg, IntraVENous, TID, Polliard, Jacolyn Savers T, NP, 2 mg at 11/28/19 2336   albumin human 25% (BUMINATE) solution 12.5 g, 12.5 g, IntraVENous, BID, Polliard, Jacolyn Savers T, NP, 12.5 g at 11/28/19 1525   digoxin (LANOXIN) tablet 0.25 mg, 0.25 mg, Oral, DAILY, Polliard, Chrisolyn Savers T, NP, 0.25 mg at 11/28/19 9413   potassium chloride SR (KLOR-CON 10) tablet 60 mEq, 60 mEq, Oral, TID, Sharon, Heaters Carolemi, NP, 60 mEq at 11/28/19 9668   cefepime (MAXIPIME) 2 g in 0.9% sodium chloride (MBP/ADV) 100 mL, 2 g, IntraVENous, Q8H, Danny Herreraa T, NP, Last Rate: 200 mL/hr at 11/28/19 1115, 2 g at 11/28/19 1115   magnesium oxide (MAG-OX) tablet 400 mg, 400 mg, Oral, BID, Alexandria Montez, NP, 400 mg at 11/28/19 9239   oxyCODONE IR (ROXICODONE) tablet 5 mg, 5 mg, Oral, Q6H PRN, Suzanna Davis MD 
  mirtazapine (REMERON) tablet 15 mg, 15 mg, Oral, QHS, Alexandria Montez, NP, 15 mg at 11/27/19 2130 
  balsam peru-castor oil (VENELEX) ointment, , Topical, TID, Mera Andrews MD 
  tamsulosin [facility]) capsule 0.4 mg, 0.4 mg, Oral, DAILY, Logan Kincaid MD, 0.4 mg at 11/28/19 8242   aspirin chewable tablet 81 mg, 81 mg, Oral, DAILY, Levi, Shana B, NP, 81 mg at 11/28/19 5478   sildenafil (antihypertensive) (REVATIO) tablet 20 mg, 20 mg, Oral, TID, Levi, Shana B, NP, 20 mg at 11/28/19 0823 
  pantoprazole (PROTONIX) tablet 40 mg, 40 mg, Oral, ACB, Levi, Shana B, NP, 40 mg at 11/28/19 0720 
  insulin lispro (HUMALOG) injection, , SubCUTAneous, AC&HS, Levi, Shana B, NP, Stopped at 11/27/19 1630   Warfarin MD/NP dosing, , Other, PRN, Mounika Altman MD 
  epoetin yvonne-epbx (RETACRIT) injection 20,000 Units, 20,000 Units, SubCUTAneous, Q7D, Clementina Tillman MD, 20,000 Units at 11/26/19 6225   lidocaine (LIDODERM) 5 % patch 1 Patch, 1 Patch, TransDERmal, Q24H, Shana Sims NP, 1 Patch at 11/27/19 1916 
  0.45% sodium chloride infusion, 10 mL/hr, IntraVENous, CONTINUOUS, Suzanna Davis MD, Stopped at 11/22/19 1736   sodium chloride (NS) flush 5-40 mL, 5-40 mL, IntraVENous, Q8H, Suzanna Davis MD, 40 mL at 11/28/19 0720   sodium chloride (NS) flush 5-40 mL, 5-40 mL, IntraVENous, PRN, Suzanna Davis MD 
  acetaminophen (TYLENOL) tablet 650 mg, 650 mg, Oral, Q6H PRN, Suzanna Davis MD, 650 mg at 11/27/19 2130 
  naloxone [facility]) injection 0.4 mg, 0.4 mg, IntraVENous, PRN, Suzanna Davis MD 
  ondansetron [facility]) injection 4 mg, 4 mg, IntraVENous, Q4H PRN, Suzanna Davis MD, 4 mg at 11/20/19 2000   albuterol-ipratropium (DUO-NEB) 2.5 MG-0.5 MG/3 ML, 3 mL, Nebulization, Q6H PRN, Madelyn Lantigua MD 
  senna-docusate (PERICOLACE) 8.6-50 mg per tablet 1 Tab, 1 Tab, Oral, DAILY, Madelyn Lantigua MD, 1 Tab at 11/27/19 7908 
  polyethylene glycol (MIRALAX) packet 17 g, 17 g, Oral, DAILY, Madelyn Lantigua MD, Stopped at 11/26/19 0900 
  bisacodyl (DULCOLAX) tablet 5 mg, 5 mg, Oral, DAILY PRN, Madelyn Lantigua MD 
  sucralfate (CARAFATE) tablet 1 g, 1 g, Oral, AC&HS, Madelyn Lantigua MD, 1 g at 11/28/19 1205   influenza vaccine 2019-20 (6 mos+)(PF) (FLUARIX/FLULAVAL/FLUZONE QUAD) injection 0.5 mL, 0.5 mL, IntraMUSCular, PRIOR TO DISCHARGE, Mounika Altman MD 
  hydrALAZINE (APRESOLINE) 20 mg/mL injection 20 mg, 20 mg, IntraVENous, Q6H PRN, Shana Sims NP, 20 mg at 11/19/19 0547 
  dextrose 10 % infusion 125-250 mL, 125-250 mL, IntraVENous, PRN, Mounika Altman MD, Stopped at 11/18/19 0700 
  milrinone (PRIMACOR) 20 MG/100 ML D5W infusion, 0.375 mcg/kg/min, IntraVENous, TITRATE, Mounika Altman MD, Last Rate: 10 mL/hr at 11/28/19 0247, 0.375 mcg/kg/min at 11/28/19 0247 Thank you for allowing me to participate in this patient's care. Brandy Rizvi MD PhD 
[facility] [address] [address], [address] Phone: [phone] Fax: [phone]

## 2019-11-28 NOTE — PROGRESS NOTES
Problem: Falls - Risk of 
Goal: *Absence of Falls Description Document Donato Disla Fall Risk and appropriate interventions in the flowsheet. Outcome: Progressing Towards Goal 
Note: Fall Risk Interventions: 
Mobility Interventions: Communicate number of staff needed for ambulation/transfer Mentation Interventions: Adequate sleep, hydration, pain control, Reorient patient, More frequent rounding, Room close to nurse's station Medication Interventions: Assess postural VS orthostatic hypotension Elimination Interventions: Toileting schedule/hourly rounds, Call light in reach, Stay With Me (per policy) History of Falls Interventions: Room close to nurse's station Problem: Pain Goal: *Control of Pain Outcome: Progressing Towards Goal 
  
Problem: Heart Failure: Discharge Outcomes Goal: *Demonstrates ability to perform prescribed activity without shortness of breath or discomfort Outcome: Progressing Towards Goal

## 2019-11-28 NOTE — PROGRESS NOTES
1900: pt OOB x 4 hrs total today. YANCEY with generalized weakness. Max assist x 2 stand pivot transfers. Wife observed this RN perform lvad dressing change. Cardiac Surgery Shift Summary: 
 
1. I/O's: Intake:  
Meals: 25-50% of each meal (fair) Output: Lizama catheter in place Has patient had BM since surgery? Yes 
 
2. Oxygen Requirements: Patient on 4 L O2 (stable) 3. Daily Weights (weight change in 24 hours): No significant change in weight (0 - 2 lbs.) 4. Medication Administration: No variations to medication administration 1930: Bedside and Verbal shift change report given to connie resendiz rn (oncoming nurse) by kelly ferrara rn (offgoing nurse). Report included the following information SBAR, Kardex, Intake/Output, MAR and Recent Results. Problem: Falls - Risk of 
Goal: *Absence of Falls Description Document Geovanni Connor Fall Risk and appropriate interventions in the flowsheet. Outcome: Progressing Towards Goal 
Note: Fall Risk Interventions: 
Mobility Interventions: Communicate number of staff needed for ambulation/transfer, Assess mobility with egress test 
 
Mentation Interventions: Door open when patient unattended Medication Interventions: Evaluate medications/consider consulting pharmacy Elimination Interventions: Call light in reach, Elevated toilet seat, Patient to call for help with toileting needs, Toilet paper/wipes in reach, Toileting schedule/hourly rounds, Stay With Me (per policy) History of Falls Interventions: Room close to nurse's station Problem: Patient Education: Go to Patient Education Activity Goal: Patient/Family Education Outcome: Progressing Towards Goal 
  
Problem: Pressure Injury - Risk of 
Goal: *Prevention of pressure injury Description Document Mich Scale and appropriate interventions in the flowsheet.  
Outcome: Progressing Towards Goal 
Note: Pressure Injury Interventions: 
Sensory Interventions: Avoid rigorous massage over bony prominences, Chair cushion, Discuss PT/OT consult with provider, Keep linens dry and wrinkle-free, Maintain/enhance activity level, Minimize linen layers, Monitor skin under medical devices, Pressure redistribution bed/mattress (bed type) Moisture Interventions: Contain wound drainage, Minimize layers Activity Interventions: Increase time out of bed Mobility Interventions: HOB 30 degrees or less, Pressure redistribution bed/mattress (bed type), PT/OT evaluation Nutrition Interventions: Document food/fluid/supplement intake, Discuss nutritional consult with provider, Offer support with meals,snacks and hydration Friction and Shear Interventions: HOB 30 degrees or less Problem: Patient Education: Go to Patient Education Activity Goal: Patient/Family Education Outcome: Progressing Towards Goal 
  
Problem: LVAD Post-op Day 8 to day 14 Goal: Activity/Safety Outcome: Progressing Towards Goal 
Goal: Consults, if ordered Outcome: Progressing Towards Goal 
Goal: Diagnostic Test/Procedures Outcome: Progressing Towards Goal 
Goal: Nutrition/Diet Outcome: Progressing Towards Goal 
Goal: Medications Outcome: Progressing Towards Goal 
Goal: Discharge Planning Outcome: Progressing Towards Goal 
Goal: Respiratory Outcome: Progressing Towards Goal 
Goal: Treatments/Interventions/Procedures Outcome: Progressing Towards Goal 
Goal: Psychosocial 
Outcome: Progressing Towards Goal 
Goal: *Hemodynamically stable Outcome: Progressing Towards Goal 
Goal: *Lungs clear or at baseline Outcome: Progressing Towards Goal 
Goal: *Adequate oxygenation Outcome: Progressing Towards Goal 
Goal: *Optimal pain control at patient's stated goal 
Outcome: Progressing Towards Goal 
Goal: *Incisions without signs and symptoms of wound complication Outcome: Progressing Towards Goal 
Goal: *Tolerating diet Outcome: Progressing Towards Goal 
Note:  
Likes breakfast, not the food provided here for lunch or dinner Goal: *Demonstrates progressive activity Outcome: Progressing Towards Goal

## 2019-11-29 NOTE — PROGRESS NOTES
[address] [facility] in [place], [place] Inpatient Progress Note Patient name: Ciro Bradford Patient : 1950 Patient MRN: [id number] Attending MD: Catalina Chamorro MD 
Date of service: 19 Chief Complaint:  
Cardiogenic Shock  
  
HPI: Sona Kiser is a 71y.o. year old pleasant white male with a history of HTN, HLD, JOSE, CAD s/p cardiac arrest VFib s/p CABG () c/b sternal would infection and sternectomy, ischemic cardiomyopathy LVEF 15-20%, s/p ICD and with LBBB. He was admitted with acute on chronic systolic heart failure with massive volume overload > 20 lbs, in the setting of atrial fibrillation s/p failed DCCV and underwent DCCV and RHC on .  S/p BiVICD on 19 with Dr. Gerhard Mckinney. He was discharged home home on IV milrinone on 19. [facility] has been followed closely by Dr. Philip Lam and the [facility]. 
  
Mr. Kiser was admitted for acute on chronic systolic heart failure. He underwent implantation of Impella 5.0 due to CS and has completed his LVAD evaluation. [facility] meets criteria for implant of HM3 as DT. He was NYHA Class IV prior to implant of Impella 5.0, has LVEF < 15%, was intolerant of GDMT due to symptomatic hypotension and renal dysfunction. He remains dependent on temporary MCS support. RHC  revealed compensated hemodynamics on Impella. His renal function has improved dramatically with improvement in his hemodynamics. He is not a suitable transplant candidate due to single kidney, sternectomy, debility, and frailty. He was reviewed by Bertin Jansen and felt to be a high risk heart transplant candidate due to multiple co-morbidities as well as the afore mentioned conditions.  He remained in the CCU and underwent a HM 3 implant as DT on .  
  
24Hr Events S/p left arm thrombectomy Cr stable Adequate diuresis Pain improving  
  
Procedure:  Procedure(s): REMOVAL TEMPORARY LEFT VENTRICULAR ASSIST DEVICE, IMPLANTATION OF LEFT VENTRICULAR ASSIST DEVICE PERMANENT (VAD), ECC, JACQUE, EPI AORTIC US BY DR Adrian Combs.    
POD:  11 
  
  
Impression / Plan:  
Heart Failure Status: NYHA Class IV INTERMACS Category 2 
  
Chronic systolic heart failure due to ICM, NYHA Class IV, EF < 15%, cardiogenic shock bridged with Impella 5.0 support to HM 3 implant S/p Impella removal and LVAD implant Continue milrinone 0.375mcg/kg/min for now Cont Sildenafil - will need PA 
 IV Bumex to 2 mg TID  + 25% albumin BID No AA/ARB/ARNI post op- will start as appropriate Strict I/O, daily weights, Na+ restricted diet Continue LVAD education Daily dressing changes  
  
Anticoagulation for LVAD, INR goal 2-3 Continue warfarin, ASA Con coumadin tonight 5 mg, INR 2.1 
  
Acute Respiratory Failure post op Resolved Pulmonary hygiene Wean NC for sats > 92% Cont home CPAP - wife bringing back up mask  
  
Post op Pain PRN oxycodone Comfort measures  
  
L Brachial Artery Thrombosis s/p thrombectomy Surgical management per Dr. Rodo Marina On bivalrudin and warfarin Pain improved  
  
Swelling, pain of right arm, acute Doppler studies (-) for thrombus  
Continue AC Confirmed PICC placement 
  
CKD 3, atrophic left kidney Appreciate Nephrology assistance Assess diuretic dosing daily IV bumex Avoid nephrotoxins Trend labs  
  
CAD s/p CABG Cont low dose ASA- watch platelets No BB d/t RV failure Hold statin due to recent hepatic failure LH performed 11/13 - low likelihood of benefit from revascularization  
  
Hx of VF arrest s/p BiV-ICD No recent shocks Keep K > 4 and Mg > 2  
   
PAF Currently in ST, Paced Cont PO Amio Digoxin to 25 mcg daily Check EKG today to assess rhythm and QTc 
  
Hypokalemia 
 Klor Con to 60 mEq TID PO  
  
Hx of gout Uric acid WNL 
  
Suspected aspiration pneumonia RUL consolidation Suspect aspiration related to over sedation Limit sedatives Sputum culture Cefepime 2g q8h x 7 days total 
  
 Urinary retention, c/b serratia UTI and hematuria Appreciate Urology consult Cystoscopy performed 11/13 shows catheter induced cystitis Repeat UA shows resolution of UTI  
  
Malnutrition Appreciate Nutritionist consult Prealbumin down to 7.6 Continue Marinol 5 mg PO BID 
6 small meals daily Continue mirtazapine - watch Na+ 
  
COPD Appreciate Pulmonology assistance Continue nebs PRN   
PFTs complete 
  
Anemia Multifactorial, due to hemolysis + epistaxis + hematuria Intolerant of octreotide or doxycycline for AVM ppx due to prolonged QTc Transfuse for Hgb goal > 8 
  
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  
  
Hx of sternal wound infection, sternectomy Sternum closed post op- monitor  
  
Vocal cord paralysis Improved Speech therapy following May need MBS 
  
Debility Continue PT/OT  
  
Ppx Protonix PT/OT Will continue cefepime for 2 weeks post op per Dr. Elda Anguiano for Impella prophylaxis Bowel regimen Cont Mechanical soft PICC placed 
  
Dispo: 
Likely will need IP rehab LVAD INTERROGATION: 
Device interrogated in person Device function normal, normal flow, no events LVAD Pump Speed (RPM): 5200 Pump Flow (LPM): 4.1 MAP: 76 
PI (Pulsitility Index): 3.5 Power: 3.6  Test: Yes 
Back Up  at Bedside & Labeled: Yes Power Module Test: Yes Driveline Site Care: No 
Driveline Dressing: Clean, Dry, and Intact Outpatient: No 
MAP in Therapeutic Range (Outpatient): Yes Testing Alarms Reviewed: Yes 
Back up SC speed: 5200 Back up Low Speed Limit: 4800 Emergency Equipment with Patient?: Yes Emergency procedures reviewed?: Yes Drive line site inspected?: Yes Drive line intergrity inspected?: Yes Drive line dressing changed?: No 
 
PHYSICAL EXAM: 
Visit Vitals BP (!) 74/56 Pulse 87 Temp 97.6 °F (36.4 °C) Resp 18 Ht 6' 2\" (1.88 m) Wt 194 lb 0.1 oz (88 kg) SpO2 98% BMI 24.91 kg/m² General: Patient is well developed, well-nourished in no acute distress HEENT: Normocephalic and atraumatic. No scleral icterus. Pupils are equal, round and reactive to light and accomodation. No conjunctival injection. Oropharynx is clear. Neck: Supple. No evidence of thyroid enlargements or lymphadenopathy. JVD: Cannot be appreciated Lungs: Breath sounds are equal and clear bilaterally. No wheezes, rhonchi, or rales. Heart: Regular rate and rhythm with normal S1 and S2. No murmurs, gallops or rubs. Abdomen: Soft, no mass or tenderness. No organomegaly or hernia. Bowel sounds present. Genitourinary and rectal: deferred Extremities: No cyanosis, clubbing, or edema. Neurologic: No focal sensory or motor deficits are noted. Grossly intact. Psychiatric: Awake, alert an doriented x 3. Appropriate mood and affect. Skin: Warm, dry and well perfused. No lesions, nodules or rashes are noted. REVIEW OF SYSTEMS: 
General: Denies fever, night sweats. Ear, nose and throat: Denies difficulty hearing, sinus problems, runny nose, post-nasal drip, ringing in ears, mouth sores, loose teeth, ear pain, nosebleeds, sore throate, facial pain or numbess Cardiovascular: see above in the interval history Respiratory: Denies cough, wheezing, sputum production, hemoptysis. Gastrointestinal: Denies heartburn, constipation, intolerance to certain foods, diarrhea, abdominal pain, nausea, vomiting, difficulty swallowing, blood in stool Kidney and bladder: Denies painful urination, frequent urination, urgency, prostate problems and impotence Musculoskeletal: Denies joint pain, muscle weakness Skin and hair: Denies change in existing skin lesions, hair loss or increase, breast changes PAST MEDICAL HISTORY: 
Past Medical History:  
Diagnosis Date  Degenerative disc disease, lumbar  Heart failure ([address]  High cholesterol  Hypertension  Paroxysmal atrial fibrillation ([address] 4/2/2019  Spinal stenosis PAST SURGICAL HISTORY: 
Past Surgical History:  
Procedure Laterality Date  COLONOSCOPY Left 2019 COLONOSCOPY at bedside performed by Cori Lozano MD at [address]  HX CORONARY ARTERY BYPASS GRAFT    
 triple  HX HERNIA REPAIR    
 HX IMPLANTABLE CARDIOVERTER DEFIBRILLATOR  MA CARDIOVERSION ELECTIVE ARRHYTHMIA EXTERNAL N/A 6/10/2019 EP CARDIOVERSION performed by Carrol Barker MD at [address] [facility] CATH LAB  MA CARDIOVERSION ELECTIVE ARRHYTHMIA EXTERNAL N/A 2019 EP CARDIOVERSION performed by Bina Ramírez MD at [address], [facility] CATH LAB  MA INSJ/RPLCMT PERM DFB W/TRNSVNS LDS 1/DUAL CHMBR N/A 2019 INSERT ICD BIV MULTI performed by Antonio Darden MD at [address], [facility] CATH LAB  MA TCAT IMPL WRLS P-ART PRS SNR L-T HEMODYN MNTR N/A 2019 IMPLANT HEART FAILURE MONITORING DEVICE performed by Irene Coleman MD at [address], [facility] CATH LAB FAMILY HISTORY: 
Family History Problem Relation Age of Onset  Lung Disease Mother  Hypertension Mother Vertie Amis Arthritis-osteo Mother  Heart Disease Mother  Heart Disease Father  Diabetes Father SOCIAL HISTORY: 
Social History Socioeconomic History  Marital status:  Spouse name: Not on file  Number of children: Not on file  Years of education: Not on file  Highest education level: Not on file Tobacco Use  Smoking status: Former Smoker Last attempt to quit: 2010 Years since quittin.0  Smokeless tobacco: Never Used Substance and Sexual Activity  Alcohol use: Not Currently Comment: rarely  Drug use: Never Other Topics Concern LABORATORY RESULTS: 
  
Labs Latest Ref Rng & Units 2019 WBC 4.1 - 11.1 K/uL 7.9 7.1 7.0 7.5 - - 6.0  
RBC 4.10 - 5.70 M/uL 2.69(L) 2.83(L) 2.91(L) 3.13(L) - - 3.15(L) Hemoglobin 12.1 - 17.0 g/dL 8. 1(L) 8.5(L) 8. 9(L) 9.4(L) - - 9. 5(L) Hematocrit 36.6 - 50.3 % 26. 7(L) 27. 9(L) 28. 6(L) 30. 9(L) - - 30. 8(L) MCV 80.0 - 99.0 FL 99. 3(H) 98.6 98.3 98.7 - - 97.8 Platelets 655 - 585 K/uL 219 216 222 194 - - 170 Lymphocytes 12 - 49 % - - - - - - - Monocytes 5 - 13 % - - - - - - - Eosinophils 0 - 7 % - - - - - - - Basophils 0 - 1 % - - - - - - - Albumin 3.5 - 5.0 g/dL 2. 2(L) 2. 2(L) 2. 2(L) 2. 1(L) - - 2. 0(L) Calcium 8.5 - 10.1 MG/DL 8.6 8.4(L) 8. 3(L) 8.6 - - 7. 9(L) SGOT 15 - 37 U/L 12(L) 13(L) 10(L) 11(L) - - 14(L) Glucose 65 - 100 mg/dL 141(H) 135(H) 95 161(H) - - 190(H) BUN 6 - 20 MG/DL 17 16 20 20 - - 20 Creatinine 0.70 - 1.30 MG/DL 1.33(H) 1.12 1.08 1.10 - - 1.04 Sodium 136 - 145 mmol/L 131(L) 132(L) 134(L) 134(L) - - 131(L) Potassium 3.5 - 5.1 mmol/L 4.2 4.1 4.1 4.4 - 3. 4(L) 3. 2(L) TSH 0.36 - 3.74 uIU/mL - - - - - - -  
PSA 0.01 - 4.0 ng/mL - - - - - - -  
LDH 85 - 241 U/L 246(H) 218 230 235 258(H) - -  
CEA ng/mL - - - - - - - Some recent data might be hidden Lab Results Component Value Date/Time TSH 2.40 10/25/2019 07:39 PM  
 TSH 2.45 06/01/2019 04:16 AM  
 
 
ALLERGY: 
No Known Allergies CURRENT MEDICATIONS: 
 
Current Facility-Administered Medications:  
  lactobac ac& pc-s.zafar-bKtianim (TIAN Q/RISAQUAD), 1 Cap, Oral, DAILY, Bita Henry MD, 1 Cap at 11/29/19 9875   dronabinol (MARINOL) capsule 5 mg, 5 mg, Oral, ACB&D, Polliard, Hamp Pallas T, NP, 5 mg at 11/29/19 0730   bumetanide (BUMEX) injection 2 mg, 2 mg, IntraVENous, TID, Polliard, Hamp Pallas T, NP, 2 mg at 11/29/19 0809 
  albumin human 25% (BUMINATE) solution 12.5 g, 12.5 g, IntraVENous, BID, Polliard, Hamp Pallas T, NP, 12.5 g at 11/29/19 6515   digoxin (LANOXIN) tablet 0.25 mg, 0.25 mg, Oral, DAILY, Polliard, Hamp Pallas T, NP, 0.25 mg at 11/29/19 1209   potassium chloride SR (KLOR-CON 10) tablet 60 mEq, 60 mEq, Oral, TID, Polliard, Ira Romeo, NP, 60 mEq at 11/29/19 7024   cefepime (MAXIPIME) 2 g in 0.9% sodium chloride (MBP/ADV) 100 mL, 2 g, IntraVENous, Q8H, Rin Herrera NP, Last Rate: 200 mL/hr at 11/29/19 1104, 2 g at 11/29/19 1104   magnesium oxide (MAG-OX) tablet 400 mg, 400 mg, Oral, BID, Collette Corti E, NP, 400 mg at 11/29/19 9041 
  oxyCODONE IR (ROXICODONE) tablet 5 mg, 5 mg, Oral, Q6H PRN, Nghia Quijano MD 
  mirtazapine (REMERON) tablet 15 mg, 15 mg, Oral, QHS, Collette Corti, NP, 15 mg at 11/28/19 2111   balsam peru-castor oil (VENELEX) ointment, , Topical, TID, Eveline Andrews MD 
  [facility]) capsule 0.4 mg, 0.4 mg, Oral, DAILY, Logan Kincaid MD, 0.4 mg at 11/29/19 3557   aspirin chewable tablet 81 mg, 81 mg, Oral, DAILY, Levi, Shana B, NP, 81 mg at 11/29/19 4058   sildenafil (antihypertensive) (REVATIO) tablet 20 mg, 20 mg, Oral, TID, Levi, Shana B, NP, 20 mg at 11/29/19 0362   pantoprazole (PROTONIX) tablet 40 mg, 40 mg, Oral, ACB, Levi, Shana B, NP, 40 mg at 11/29/19 0730 
  insulin lispro (HUMALOG) injection, , SubCUTAneous, AC&HS, Levi, Shana B, NP, Stopped at 11/27/19 1630   Warfarin MD/NP dosing, , Other, PRN, Mounika Altman MD 
  epoetin yvonne-epbx (RETACRIT) injection 20,000 Units, 20,000 Units, SubCUTAneous, Q7D, Milvia Jefferson MD, 20,000 Units at 11/26/19 8619   lidocaine (LIDODERM) 5 % patch 1 Patch, 1 Patch, TransDERmal, Q24H, Levi, Shana B, NP, 1 Patch at 11/27/19 1916   sodium chloride (NS) flush 5-40 mL, 5-40 mL, IntraVENous, Q8H, Charmayne Brazier, Gregg L, MD, 10 mL at 11/29/19 0731 
  sodium chloride (NS) flush 5-40 mL, 5-40 mL, IntraVENous, PRN, Nghia Quijano MD 
  acetaminophen (TYLENOL) tablet 650 mg, 650 mg, Oral, Q6H PRN, Nghia Quijano MD, 650 mg at 11/28/19 2112 
  naloxone (NARCAN) injection 0.4 mg, 0.4 mg, IntraVENous, PRN, Nghia Quijano MD 
  ondansetron [facility]) injection 4 mg, 4 mg, IntraVENous, Q4H PRN, Sherry Arteaga MD, 4 mg at 11/20/19 2000 
  albuterol-ipratropium (DUO-NEB) 2.5 MG-0.5 MG/3 ML, 3 mL, Nebulization, Q6H PRN, Sherry Arteaga MD, 3 mL at 11/28/19 2206   senna-docusate (PERICOLACE) 8.6-50 mg per tablet 1 Tab, 1 Tab, Oral, DAILY, Sherry Arteaga MD, 1 Tab at 11/27/19 0926 
  polyethylene glycol (MIRALAX) packet 17 g, 17 g, Oral, DAILY, Sherry Arteaga MD, Stopped at 11/26/19 0900 
  bisacodyl (DULCOLAX) tablet 5 mg, 5 mg, Oral, DAILY PRN, Sherry Arteaga MD 
  sucralfate (CARAFATE) tablet 1 g, 1 g, Oral, AC&HS, Sherry Arteaga MD, 1 g at 11/29/19 1104   influenza vaccine 2019-20 (6 mos+)(PF) (FLUARIX/FLULAVAL/FLUZONE QUAD) injection 0.5 mL, 0.5 mL, IntraMUSCular, PRIOR TO DISCHARGE, Mounika Altman MD 
  hydrALAZINE (APRESOLINE) 20 mg/mL injection 20 mg, 20 mg, IntraVENous, Q6H PRN, Shana Sims NP, 20 mg at 11/19/19 0547 
  dextrose 10 % infusion 125-250 mL, 125-250 mL, IntraVENous, PRN, Mounika Altman MD, Stopped at 11/18/19 0700 
  milrinone (PRIMACOR) 20 MG/100 ML D5W infusion, 0.375 mcg/kg/min, IntraVENous, TITRATE, Mounika Altman MD, Last Rate: 10 mL/hr at 11/29/19 0001, 0.375 mcg/kg/min at 11/29/19 0001 Thank you for allowing me to participate in this patient's care. Corrine Martin MD PhD 
[address], [address] Phone: [phone] Fax: [phone]

## 2019-11-29 NOTE — PROGRESS NOTES
Problem: Mobility Impaired (Adult and Pediatric) Goal: *Acute Goals and Plan of Care (Insert Text) Description FUNCTIONAL STATUS PRIOR TO ADMISSION: Patient was modified independent using a single point cane for functional mobility. Patient reports an increasingly sedentary lifestyle 2* fatigue and SOB/dyspnea. Retired (so is his wife). Patient reports x 3 falls within the last couple of weeks. Patient is wearing either nasal cannula or CPAP at night. LVAD work-up has been initiated. HOME SUPPORT PRIOR TO ADMISSION: The patient lived with his wife, but did not require physical assistance. Physical Therapy Goals: 
 
Re-evaluation completed 11/20/2019 and new goals formulated following LVAD implantation. 1.  Patient will move from supine to sit and sit to supine, scoot up and down and roll side to side in bed with minimal assistance/contact guard assist within 7 days. 2.  Patient will perform sit to/from stand with minimal assistance/contact guard assist within 7 days. 3.  Patient will ambulate 150 feet with least restrictive assistive device and minimal assistance/contact guard assist within 7 days. 4.  Patient will ascend/descend 4 stairs with handrail(s) with minimal assistance/contact guard assist within 7 days. 5.  Patient will perform cardiac exercises per protocol with supervision within 7 days. 6.  Patient will verbally and functionally recall 3/3 sternal precautions within 7 days. 7.  Patient will perform power exchange for power module to/from battery with moderate assistance  within 7 days. Initiated 10/27/2019; updated 11/15/2019 1. Patient will move from supine to sit and sit to supine, scoot up and down and roll side to side in bed with independence within 7 days. 2.  Patient will perform sit to/from stand with supervision/set-up within 7 days. 3.  Patient will ambulate 200 feet with least restrictive assistive device and supervision/set-up within 7 days. 4.  Patient will ascend/descend 4 stairs with  handrail(s) with supervision/set-up within 7 days for functional strengthening and community reintegration. Lencho Severino 5.  Patient will verbally and functionally recall 3/3 sternal precautions within 7 days in preparation for LVAD implantation. 6.  Patient will perform a mock power exchange for power module to/from battery with supervision/set-up within 7 days in preparation for LVAD implantation. 1.  Patient will move from supine to sit and sit to supine, scoot up and down and roll side to side in bed with independence within 7 days. 2.  Patient will perform sit to/from stand with supervision/set-up within 7 days. 3.  Patient will ambulate 150 feet with least restrictive assistive device and supervision/set-up within 7 days. 4.  Patient will ascend/descend 4 stairs with  handrail(s) with supervision/set-up within 7 days for functional strengthening and community reintegration. Lencho Severino 5.  Patient will verbally and functionally recall 3/3 sternal precautions within 7 days in preparation for LVAD implantation. 6.  Patient will perform a mock power exchange for power module to/from battery with supervision/set-up within 7 days in preparation for LVAD implantation. Outcome: Progressing Towards Goal 
 
PHYSICAL THERAPY TREATMENT Patient: Natalie Lofton (75 y.o. male) Date: 11/29/2019 Diagnosis: Acute decompensated [facility] failure [facility] [I50.9] <principal problem not specified> Procedure(s) (LRB): LEFT BRACHIAL THROMBECTOMY (Left) 4 Days Post-Op Precautions: Fall, Sternal 
Chart, physical therapy assessment, plan of care and goals were reviewed. ASSESSMENT Patient continues with skilled PT services and is progressing towards goals. Pt is motivated to improve mobility and independence. Pt fatigues quickly. Pt has an unstable CHHAYA with decrease anterior weightshift. Pt has decrease LE stability with fatigue.  Pt has edematous LE which may be affecting proprioception. Co-treat with OT due to fatigue and mobility needs. Current Level of Function Impacting Discharge (mobility/balance): min to mod A x2 Other factors to consider for discharge: LVAD, Edema, decrease activity tolerance and strength PLAN : 
Patient continues to benefit from skilled intervention to address the above impairments. Continue treatment per established plan of care. to address goals. Recommendation for discharge: (in order for the patient to meet his/her long term goals) Therapy 3 hours per day 5-7 days per week This discharge recommendation: 
Has been made in collaboration with the attending provider and/or case management IF patient discharges home will need the following DME: to be determined (TBD) SUBJECTIVE:  
Patient stated I am just so tired all the time.  OBJECTIVE DATA SUMMARY:  
Critical Behavior: 
Neurologic State: Alert Orientation Level: Oriented X4 Cognition: Appropriate for age attention/concentration, Appropriate safety awareness, Appropriate decision making Safety/Judgement: Awareness of environment Functional Mobility Training: 
Bed Mobility: 
  
  
Sit to Supine: Moderate assistance;Assist x2 Transfers: 
Sit to Stand: Minimum assistance;Assist x2; Additional time Stand to Sit: Minimum assistance;Assist x2 Bed to Chair: Minimum assistance; Moderate assistance;Assist x2(chair-bed-chair-bed) Balance: 
Sitting: Intact; Without support Sitting - Static: Good (unsupported) Sitting - Dynamic: Fair (occasional) Standing: Impaired; Without support Standing - Static: Constant support; Fair 
Standing - Dynamic : Poor Ambulation/Gait Training: 
Distance (ft): 6 Feet (ft)(x3 trial seated rest break) Ambulation - Level of Assistance: Moderate assistance;Maximum assistance;Assist x2 Gait Abnormalities: Decreased step clearance; Path deviations; Step to gait;Trunk sway increased(posterior weightshift) Base of Support: Center of gravity altered Stairs: Therapeutic Exercises:  
 
Pain Rating: 
Left UE, and incisional pain Activity Tolerance:  
Limited Please refer to the flowsheet for vital signs taken during this treatment. After treatment patient left in no apparent distress:  
Supine in bed and Call bell within reach COMMUNICATION/COLLABORATION:  
The patients plan of care was discussed with: Registered Nurse [address] [place] PTA Time Calculation: 33 mins

## 2019-11-29 NOTE — PROGRESS NOTES
ID Progress Note 
2019 Subjective:  
 
Remains off vent, looks good Objective: Antibiotics: 1. Levaquin 2. Rifampin 3. Fluconazole 4. Vancomycin 5. Cefazolin Vitals:  
Visit Vitals BP (!) 74/56 Pulse 100 Comment: Simultaneous filing. User may not have seen previous data. Temp 98.5 °F (36.9 °C) Comment: Simultaneous filing. User may not have seen previous data. Resp 18 Comment: Simultaneous filing. User may not have seen previous data. Ht 6' 2\" (1.88 m) Wt 88 kg (194 lb 0.1 oz) SpO2 96% Comment: Simultaneous filing. User may not have seen previous data. BMI 24.91 kg/m² Tmax:  Temp (24hrs), Av.3 °F (36.8 °C), Min:97.6 °F (36.4 °C), Max:99.5 °F (37.5 °C) Exam:  Lungs:  clear to auscultation bilaterally Heart:  regular rate and rhythm Abdomen:  soft, non-tender. Bowel sounds normal. No masses,  no organomegaly Urine is clearing up Labs:     
Recent Labs  
  19 
0437 19 
0525 19 
9306 WBC 7.9 7.1 7.0 HGB 8.1* 8.5* 8.9*  
 216 222 BUN 17 16 20 CREA 1.33* 1.12 1.08  
SGOT 12* 13* 10* AP 92 96 96 TBILI 1.1* 1.1* 1.3* Cultures: No results found for: [facility] Lab Results Component Value Date/Time Culture result: HEAVY ENTEROCOCCUS SPECIES NOTED (A) 2019 11:10 AM  
 Culture result: LIGHT YEAST (A) 2019 11:10 AM  
 Culture result: NO GROWTH 5 DAYS 2019 11:18 AM  
 
 
Radiology:  
 
Line/Insert Date:        
 
Assessment:  
 
1. UTI--Serratia (2 biotypes) from culture and small amount of Enterococcus 2. CHF/cardiomyopathy Objective: 1. Continue current therapy 2. Group will see over the weekend if asked January Mann MD

## 2019-11-29 NOTE — PROGRESS NOTES
Problem: Self Care Deficits Care Plan (Adult) Goal: *Acute Goals and Plan of Care (Insert Text) Description FUNCTIONAL STATUS PRIOR TO ADMISSION: Patient was modified independent using a single point cane for functional mobility. Patient able to shower and dress himself. However, patient required assistance for household management from his wife. HOME SUPPORT: The patient lived alone with wife to provide assistance. Occupational Therapy Goals: OT weekly reassessment 11/29/19: goals modified below 1. Patient will perform upper body dressing including LVAD switchovers with moderate assistance within 7 day(s). 2.  Patient will perform lower body dressing with minimal assistance within 7 day(s). 3.  Patient will perform grooming in standing with minimum assistance within 7 day(s). 4.  Patient will perform toilet transfers with minimum assistance within 7 day(s). 5.  Patient will perform all aspects of toileting with minimal assistance within 7 day(s). 6.  Patient will participate in upper extremity therapeutic exercise/activities with supervision/set-up for 5 minutes within 7 day(s). 7.  Patient will utilize energy conservation techniques during functional activities with verbal cues within 7 day(s). 8. Patient will verbalize LVAD terminology with verbal cues within 7 day (s). Goals reviewed and continued/modified as follows 11/20/19 s/p LVAD implantation 1. Patient will perform upper body dressing including LVAD switchovers with moderate assistance within 7 day(s). 2.  Patient will perform lower body dressing with minimal assistance within 7 day(s). 3.  Patient will perform grooming with supervision/setup within 7 day(s). 4.  Patient will perform toilet transfers supervision/setupmodified independence within 7 day(s). 5.  Patient will perform all aspects of toileting with minimal assistance within 7 day(s).  
6.  Patient will participate in upper extremity therapeutic exercise/activities with supervision/set-up for 5 minutes within 7 day(s). 7.  Patient will utilize energy conservation techniques during functional activities with verbal cues within 7 day(s). 8. Patient will verbalize LVAD terminology with verbal cues within 7 day (s). Goals reviewed and continued/modified as follows 11/12/19 1. Patient will perform bathing with supervision/set-up within 7 day(s). 2.  Patient will perform lower body dressing with supervision/set-up within 7 day(s). 3.  Patient will perform grooming with modified independence within 7 day(s). 4.  Patient will perform toilet transfers with modified independence within 7 day(s). 5.  Patient will perform all aspects of toileting with supervision/set-up within 7 day(s). 6.  Patient will participate in upper extremity therapeutic exercise/activities with supervision/set-up for 5 minutes within 7 day(s). 7.  Patient will utilize energy conservation techniques during functional activities with verbal cues within 7 day(s). 8. Patient will verbalize LVAD terminology with verbal cues within 7 day (s) in preparation for implant. Continue all goals 10/30/19 post re-eval for Impella removal  
Initiated 10/28/2019 1. Patient will perform bathing with supervision/set-up within 7 day(s). 2.  Patient will perform lower body dressing with supervision/set-up within 7 day(s). 3.  Patient will perform grooming with modified independence within 7 day(s). 4.  Patient will perform toilet transfers with modified independence within 7 day(s). 5.  Patient will perform all aspects of toileting with supervision/set-up within 7 day(s). 6.  Patient will participate in upper extremity therapeutic exercise/activities with supervision/set-up for 5 minutes within 7 day(s). 7.  Patient will utilize energy conservation techniques during functional activities with verbal cues within 7 day(s).  
 
 
 
      
Outcome: Progressing Towards Goal 
  
 OCCUPATIONAL THERAPY TREATMENT/WEEKLY RE-ASSESSMENT Patient: Zay Wheeler (75 y.o. male) Date: 11/29/2019 Diagnosis: Acute decompensated heart failure ([facility] [address] [I50.9] <principal problem not specified> Procedure(s) (LRB): LEFT BRACHIAL THROMBECTOMY (Left) 4 Days Post-Op Precautions: Fall, Sternal 
Chart, occupational therapy assessment, plan of care, and goals were reviewed. ASSESSMENT Patient continues with skilled OT services and is progressing towards goals. Pt received in chair, reported increased fatigue and dyspnea this date. He required min A x 2 for sit<> stand with verbal cues for anterior weight shifting. Pt with good recall for locating  prior to mobility. Pt tremulous with fatigue with decreased fine motor coordination bilaterally, needing min  and moderate verbal cueing for connecting/disconnecting power leads. Pt needs A x 2 for mobility and transfers with decreased activity tolerance. Pt is extremely motivated to work with OT/PT services and would benefit from IP rehab setting upon discharge. Current Level of Function Impacting Discharge (ADLs): max to total A for toileting and LB ADLs, decreased static and dynamic balance during ADLs in standing, decreased endurance Other factors to consider for discharge: independent prior PLAN : 
Goals have been updated based on progression since last assessment. Patient continues to benefit from skilled intervention to address the above impairments. Continue to follow patient 6 times a week to address goals. Recommend with staff: OOB to chair and BSC with A x 2 Recommend next OT session: standing ADL to increase tolerance Recommendation for discharge: (in order for the patient to meet his/her long term goals) Therapy 3 hours per day 5-7 days per week This discharge recommendation: 
Has been made in collaboration with the attending provider and/or case management IF patient discharges home will need the following DME: to be determined (TBD) SUBJECTIVE:  
Patient stated I know Ivory Angel got to do it.  OBJECTIVE DATA SUMMARY:  
Cognitive/Behavioral Status: 
Neurologic State: Alert Orientation Level: Oriented X4 Cognition: Appropriate for age attention/concentration; Appropriate safety awareness; Appropriate decision making Perception: Appears intact Perseveration: No perseveration noted Safety/Judgement: Awareness of environment Functional Mobility and Transfers for ADLs: 
Bed Mobility: 
Sit to Supine: Moderate assistance;Assist x2 Transfers: 
Sit to Stand: Minimum assistance;Assist x2; Additional time Bed to Chair: Minimum assistance; Moderate assistance;Assist x2(chair-bed-chair-bed) Balance: 
Sitting: Intact; Without support Sitting - Static: Good (unsupported) Sitting - Dynamic: Fair (occasional) Standing: Impaired; Without support Standing - Static: Constant support; Fair 
Standing - Dynamic : Poor ADL Intervention: Pt completed 2 sets of connecting/disconnecting power leads with min A and mod verbal cues for problem solving. Pt attempted to pull once from  and required cues for proper location to screw lead. Cognitive Retraining Safety/Judgement: Awareness of environment Activity Tolerance:  
Fair, requires frequent rest breaks, and observed SOB with activity Please refer to the flowsheet for vital signs taken during this treatment. After treatment patient left in no apparent distress:  
Supine in bed, Heels elevated for pressure relief, and Call bell within reach COMMUNICATION/COLLABORATION:  
The patients plan of care was discussed with: Physical Therapy Assistant and Registered Nurse Dax Sarkar OT Time Calculation: 31 mins

## 2019-11-29 NOTE — PROGRESS NOTES
Cardiac Surgery Shift Summary: 
 
1. I/O's: Intake:  
Meals: 25-50% of each meal (fair) Output: Lizama catheter in place Has patient had BM since surgery? Yes 
 
2. Oxygen Requirements: Patient on 4 L O2 (stable) 3. Daily Weights (weight change in 24 hours): No significant change in weight (0 - 2 lbs.) 4. Medication Administration: No variations to medication administration 0730: Bedside and Verbal shift change report given to Galilea Kuo (oncoming nurse) by Armin Khan (offgoing nurse). Report included the following information SBAR, Kardex, OR Summary, Intake/Output and Cardiac Rhythm SA. Problem: Falls - Risk of 
Goal: *Absence of Falls Description Document Vera Latin Fall Risk and appropriate interventions in the flowsheet. Outcome: Progressing Towards Goal 
Note:  
Fall Risk Interventions: 
Mobility Interventions: Patient to call before getting OOB, PT Consult for mobility concerns, Strengthening exercises (ROM-active/passive), Assess mobility with egress test 
 
Mentation Interventions: Door open when patient unattended Medication Interventions: Evaluate medications/consider consulting pharmacy, Patient to call before getting OOB, Teach patient to arise slowly Elimination Interventions: Call light in reach, Patient to call for help with toileting needs, Stay With Me (per policy) History of Falls Interventions: Room close to nurse's station Call bell and personal effects within reach. Bed locked and in low position. Non skid footwear in place. Problem: Pressure Injury - Risk of 
Goal: *Prevention of pressure injury Description Document Mich Scale and appropriate interventions in the flowsheet.  
Note:  
Pressure Injury Interventions: 
Sensory Interventions: Avoid rigorous massage over bony prominences, Chair cushion, Discuss PT/OT consult with provider, Keep linens dry and wrinkle-free, Maintain/enhance activity level, Minimize linen layers, Monitor skin under medical devices, Pressure redistribution bed/mattress (bed type) Moisture Interventions: Contain wound drainage, Minimize layers Activity Interventions: Increase time out of bed, Pressure redistribution bed/mattress(bed type), PT/OT evaluation Mobility Interventions: Pressure redistribution bed/mattress (bed type), PT/OT evaluation Nutrition Interventions: Document food/fluid/supplement intake Friction and Shear Interventions: Foam dressings/transparent film/skin sealants, Lift team/patient mobility team 
 
 Turns self at appropriate intervals; skin assessed Q4H; blood glucose controlled Problem: LVAD Post-op Day 8 to day 14 Goal: Activity/Safety Outcome: Progressing Towards Goal 
Goal: Diagnostic Test/Procedures Outcome: Progressing Towards Goal 
Goal: Nutrition/Diet Outcome: Progressing Towards Goal 
Goal: Medications Outcome: Progressing Towards Goal 
Goal: Respiratory Outcome: Progressing Towards Goal 
Goal: *Hemodynamically stable Outcome: Progressing Towards Goal 
Goal: *Optimal pain control at patient's stated goal 
Outcome: Progressing Towards Goal

## 2019-11-29 NOTE — PROGRESS NOTES
Problem: Falls - Risk of 
Goal: *Absence of Falls Description Document Wolcott Roberto Fall Risk and appropriate interventions in the flowsheet. Outcome: Progressing Towards Goal 
Note: Fall Risk Interventions: 
Mobility Interventions: Communicate number of staff needed for ambulation/transfer, Assess mobility with egress test 
 
Mentation Interventions: Door open when patient unattended Medication Interventions: Evaluate medications/consider consulting pharmacy Elimination Interventions: Call light in reach, Elevated toilet seat, Patient to call for help with toileting needs, Stay With Me (per policy), Toilet paper/wipes in reach, Toileting schedule/hourly rounds History of Falls Interventions: Room close to nurse's station Problem: Patient Education: Go to Patient Education Activity Goal: Patient/Family Education Outcome: Progressing Towards Goal 
  
Problem: Pain Goal: *Control of Pain Outcome: Progressing Towards Goal 
  
Problem: Pressure Injury - Risk of 
Goal: *Prevention of pressure injury Description Document Mich Scale and appropriate interventions in the flowsheet. Outcome: Progressing Towards Goal 
Note: Pressure Injury Interventions: 
Sensory Interventions: Avoid rigorous massage over bony prominences, Chair cushion, Discuss PT/OT consult with provider, Keep linens dry and wrinkle-free, Maintain/enhance activity level, Minimize linen layers, Monitor skin under medical devices, Pressure redistribution bed/mattress (bed type) Moisture Interventions: Contain wound drainage, Minimize layers Activity Interventions: Increase time out of bed, Pressure redistribution bed/mattress(bed type), PT/OT evaluation Mobility Interventions: Pressure redistribution bed/mattress (bed type), PT/OT evaluation, HOB 30 degrees or less Nutrition Interventions: Document food/fluid/supplement intake, Offer support with meals,snacks and hydration, Discuss nutritional consult with provider Friction and Shear Interventions: HOB 30 degrees or less, Lift sheet, Minimize layers Problem: Patient Education: Go to Patient Education Activity Goal: Patient/Family Education Outcome: Progressing Towards Goal 
  
Problem: LVAD Post-op Day 8 to day 14 Goal: Activity/Safety Outcome: Progressing Towards Goal 
Goal: Consults, if ordered Outcome: Progressing Towards Goal 
Goal: Diagnostic Test/Procedures Outcome: Progressing Towards Goal 
Goal: Nutrition/Diet Outcome: Progressing Towards Goal 
Note:  
Fair appetite Goal: Medications Outcome: Progressing Towards Goal 
Note:  
Remains on primacor gtt Goal: Discharge Planning Outcome: Progressing Towards Goal 
Note:  
HH v. Inpt rehab? Goal: Respiratory Outcome: Progressing Towards Goal 
Note:  
Remains on 4L NCO2, DOUGLAS Goal: Treatments/Interventions/Procedures Outcome: Progressing Towards Goal 
Note:  
Ongoing lvad education Goal: Psychosocial 
Outcome: Progressing Towards Goal 
Goal: *Hemodynamically stable Outcome: Progressing Towards Goal 
Goal: *Lungs clear or at baseline Outcome: Progressing Towards Goal 
Goal: *Adequate oxygenation Outcome: Progressing Towards Goal 
Goal: *Optimal pain control at patient's stated goal 
Outcome: Progressing Towards Goal 
Goal: *Incisions without signs and symptoms of wound complication Outcome: Progressing Towards Goal 
Goal: *Tolerating diet Outcome: Progressing Towards Goal 
Goal: *Demonstrates progressive activity Outcome: Progressing Towards Goal

## 2019-11-29 NOTE — PROGRESS NOTES
[facility] VAD/Heart Failure Progress Note Admit Date: 10/25/2019 POD:  4 Days Post-Op Procedure:  Procedure(s): LEFT BRACHIAL THROMBECTOMY Subjective:  
Mild dyspnea, fatigue, and weakness; still sleepy at times Objective:  
Vitals: 
Blood pressure (!) 74/56, pulse 100, temperature 98.5 °F (36.9 °C), resp. rate 18, height 6' 2\" (1.88 m), weight 194 lb 0.1 oz (88 kg), SpO2 96 %. Temp (24hrs), Av.3 °F (36.8 °C), Min:97.6 °F (36.4 °C), Max:99.5 °F (37.5 °C) Hemodynamics: 
 CO: CO (l/min): 5.4 l/min CI: CI (l/min/m2): 2.5 l/min/m2 CVP: CVP (mmHg): 7 mmHg (19 0930) SVR: SVR (dyne*sec)/cm5: 1067 (dyne*sec)/cm5 (19 1200) PAP Systolic: PAP Systolic: 50 (45/63/00 6396) PAP Diastolic: PAP Diastolic: 30 (34/15/69 8742) PVR:   
 SV02: SVO2 (%): 45 % (19 1200) SCV02: SCVO2 (%): 75 % (10/29/19 1900) VAD Interrogation: LVAD (Heartmate) Pump Speed (RPM): 5200 Pump Flow (LPM): 4.4 PI (Pulsitility Index): 3.8 Power: 3.6 MAP: 70  Test: Yes 
Back Up  at Bedside & Labeled: Yes Power Module Test: Yes Driveline Site Care: Yes Driveline Dressing: Clean, Dry, and Intact EKG/Rhythm:   
 
Extubation Date / Time:  
 
CT Output:  
 
Ventilator: 
Ventilator Volumes Vt Set (ml): 550 ml (19 08) Vt Exhaled (Machine Breath) (ml): 639 ml (19 08) Vt Spont (ml): 437 ml (19 1035) Ve Observed (l/min): 7.25 l/min (19 1035) Oxygen Therapy: 
Oxygen Therapy O2 Sat (%): 96 %(Simultaneous filing. User may not have seen previous data.) (19 1500) Pulse via Oximetry: 109 beats per minute (198) O2 Device: Nasal cannula (19) O2 Flow Rate (L/min): 4 l/min (19 1500) FIO2 (%): 50 % (19 1035) CXR: 
 
Admission Weight: Last Weight Weight: 192 lb 10.9 oz (87.4 kg) Weight: 194 lb 0.1 oz (88 kg) Intake / Output / Drain: 
Current Shift:  0701 - 1900 In: 875.3 [P.O.:640; I.V.:235.3] Out: 950 [Urine:950] Last 24 hrs.:  
 
Intake/Output Summary (Last 24 hours) at 11/29/2019 1639 Last data filed at 11/29/2019 1307 Gross per 24 hour Intake 2420 ml Output 4500 ml Net -2080 ml No results for input(s): CPK, CKMB, TROIQ in the last 72 hours. Recent Labs  
  11/29/19 0437 11/28/19 
0525 11/27/19 
8376 * 132* 134* K 4.2 4.1 4.1 CO2 32 32 32 BUN 17 16 20 CREA 1.33* 1.12 1.08  
* 135* 95 PHOS  --   --  3.3 MG 2.1 2.0 2.1 WBC 7.9 7.1 7.0 HGB 8.1* 8.5* 8.9* HCT 26.7* 27.9* 28.6*  
 216 222 Recent Labs  
  11/29/19 0437 11/28/19 
0525 11/27/19 
4380 INR 2.1* 1.9* 2.0*  
PTP 20.3* 18.8* 19.7* APTT 37.7* 36.3* 50.3* No lab exists for component: PBNP Current Facility-Administered Medications:  
  warfarin (COUMADIN) tablet 5 mg, 5 mg, Oral, ONCE, Ector Hernandez MD 
  lactobac ac& pc-s.therm-b.anim (TIAN Q/RISAQUAD), 1 Cap, Oral, DAILY, Ector Hernandez MD, 1 Cap at 11/29/19 9392   dronabinol (MARINOL) capsule 5 mg, 5 mg, Oral, ACB&D, Polliard, Chioma T, NP, 5 mg at 11/29/19 0730   bumetanide (BUMEX) injection 2 mg, 2 mg, IntraVENous, TID, Chioma Herrera, NP, 2 mg at 11/29/19 0809 
  albumin human 25% (BUMINATE) solution 12.5 g, 12.5 g, IntraVENous, BID, Chioma Herrera, NP, 12.5 g at 11/29/19 2367   digoxin (LANOXIN) tablet 0.25 mg, 0.25 mg, Oral, DAILY, Chioma Herrera NP, 0.25 mg at 11/29/19 3570   potassium chloride SR (KLOR-CON 10) tablet 60 mEq, 60 mEq, Oral, TID, Zach Herrera, NP, 60 mEq at 11/29/19 7211   cefepime (MAXIPIME) 2 g in 0.9% sodium chloride (MBP/ADV) 100 mL, 2 g, IntraVENous, Q8H, Rin Herrera NP, Last Rate: 200 mL/hr at 11/29/19 1104, 2 g at 11/29/19 1104   magnesium oxide (MAG-OX) tablet 400 mg, 400 mg, Oral, BID, Luis E ACUNA NP, 400 mg at 11/29/19 0808 
  oxyCODONE IR (ROXICODONE) tablet 5 mg, 5 mg, Oral, Q6H PRN, Adrian Garza, Isabela Barillas MD 
  mirtazapine (REMERON) tablet 15 mg, 15 mg, Oral, QHS, Darcella Mons, NP, 15 mg at 11/28/19 2111   balsam peru-castor oil (VENELEX) ointment, , Topical, TID, FisJaiden cruz MD 
  tamsulosin [facility]) capsule 0.4 mg, 0.4 mg, Oral, DAILY, Logan Kincaid MD, 0.4 mg at 11/29/19 5404   aspirin chewable tablet 81 mg, 81 mg, Oral, DAILY, Levi, Shana B, NP, 81 mg at 11/29/19 0483   sildenafil (antihypertensive) (REVATIO) tablet 20 mg, 20 mg, Oral, TID, Levi, Shana B, NP, 20 mg at 11/29/19 4526   pantoprazole (PROTONIX) tablet 40 mg, 40 mg, Oral, ACB, Levi, Shana B, NP, 40 mg at 11/29/19 0730 
  insulin lispro (HUMALOG) injection, , SubCUTAneous, AC&HS, Levi, Shana B, NP, Stopped at 11/27/19 1630   Warfarin MD/NP dosing, , Other, PRN, Mounika Altman MD 
  epoetin yvonne-epbx (RETACRIT) injection 20,000 Units, 20,000 Units, SubCUTAneous, Q7D, Ayanna Palmer MD, 20,000 Units at 11/26/19 0302   lidocaine (LIDODERM) 5 % patch 1 Patch, 1 Patch, TransDERmal, Q24H, Levi, Shana B, NP, 1 Patch at 11/27/19 1916   sodium chloride (NS) flush 5-40 mL, 5-40 mL, IntraVENous, Q8H, Wash Shady PAULINO MD, 30 mL at 11/29/19 1447   sodium chloride (NS) flush 5-40 mL, 5-40 mL, IntraVENous, PRN, Lorenzo Holly MD 
  acetaminophen (TYLENOL) tablet 650 mg, 650 mg, Oral, Q6H PRN, Lorenzo Holly MD, 650 mg at 11/28/19 2112 
  naloxone (NARCAN) injection 0.4 mg, 0.4 mg, IntraVENous, PRN, Lorenzo Holly MD 
  ondansetron [facility]) injection 4 mg, 4 mg, IntraVENous, Q4H PRN, Lorenzo Holly MD, 4 mg at 11/20/19 2000 
  albuterol-ipratropium (DUO-NEB) 2.5 MG-0.5 MG/3 ML, 3 mL, Nebulization, Q6H PRN, Lorenzo Holly MD, 3 mL at 11/28/19 2206   senna-docusate (PERICOLACE) 8.6-50 mg per tablet 1 Tab, 1 Tab, Oral, DAILY, Lorenzo Holly MD, 1 Tab at 11/27/19 0989 
  polyethylene glycol (MIRALAX) packet 17 g, 17 g, Oral, DAILY, Devin Hutton, Susie Carcamo MD, Stopped at 11/26/19 0900 
  bisacodyl (DULCOLAX) tablet 5 mg, 5 mg, Oral, DAILY PRN, Jackelyn Cali MD 
  sucralfate (CARAFATE) tablet 1 g, 1 g, Oral, AC&HS, Jackelyn Cali MD, 1 g at 11/29/19 1104   influenza vaccine 2019-20 (6 mos+)(PF) (FLUARIX/FLULAVAL/FLUZONE QUAD) injection 0.5 mL, 0.5 mL, IntraMUSCular, PRIOR TO DISCHARGE, Mounika Altman MD 
  hydrALAZINE (APRESOLINE) 20 mg/mL injection 20 mg, 20 mg, IntraVENous, Q6H PRN, Shana Sims NP, 20 mg at 11/19/19 0547 
  dextrose 10 % infusion 125-250 mL, 125-250 mL, IntraVENous, PRN, Mounika Altman MD, Stopped at 11/18/19 0700 
  milrinone (PRIMACOR) 20 MG/100 ML D5W infusion, 0.375 mcg/kg/min, IntraVENous, TITRATE, Mounika Altman MD, Last Rate: 10 mL/hr at 11/29/19 0001, 0.375 mcg/kg/min at 11/29/19 0001 A/P 
  
S/P LVAD - good flows Need for anti-coagulation - coumadin DM - insulin RV dysfunction - milrinone 
  
Risk of morbidity and mortality - high Medical decision making - high complexity Signed By: Kary Ramos MD

## 2019-11-29 NOTE — PROGRESS NOTES
4554-4276: OOB to chair/worked with PT. 
1000: s/w echo dept, communicated need for echo per [facility]. 
1050: pt with some what appears to be inappropriate paced beats. S/w dr Tita Valerio, will place order for interrogation. 1200: driveline dressing changed. 1215:OOB to chair. 1230: provided pt wife Abiel Shields with sterile gloves to practice in the room. 1100: [employer] rep called by unit secretary to interrogate bivicd. 1530: [employer] rep arrived to interrogate device, called in error, pt needs [employer] ([employer]) to be paged. RN paged [employer] and made company aware of need for device interrogation. 1625: pt returned to bed. 
1630: [employer] rep interrogated pacer, no new data found. Device is functioning appropriately. 1715: echo tech to bedside to perform study. 1900:  
Cardiac Surgery Shift Summary: 
 
1. I/O's: Intake:  
Meals: 25-50% of each meal (fair) Output: Lizama catheter in place Has patient had BM since surgery? Yes 
 
2. Oxygen Requirements: Patient on 4 L O2 (stable) 3. Daily Weights (weight change in 24 hours): No significant change in weight (0 - 2 lbs.) 4. Medication Administration: No variations to medication administration 1930:Bedside and Verbal shift change report given to [facility] brian rn (oncoming nurse) by kelly ferrara rn (offgoing nurse). Report included the following information SBAR, Kardex, ED Summary, OR Summary, Procedure Summary, Intake/Output, MAR and Recent Results.

## 2019-11-29 NOTE — PROGRESS NOTES
[facility] 
 [address] [facility], [facility] [address] [facility] Phone: [phone]   [phone]   
  
Nephrology Progress Note Sona Kiser     1950     [id number] Date of Admission : 10/25/2019 
11/29/19 CC:  Follow up for JUAN J, CKD, Hyponatremia Assessment and Plan JUAN J on CKD 
- 2/2 CRS and urinary retention - Cr stable 
- cont current diuretics - keep neal for now 
- daily labs over the weekend Hyponatremia: 
- stable 
- from CHF 
- cont diuresis 
  
Gross hematuria, BPH w/ retention: 
- off CBI pre VAD. cysto pre op showed catheter related Cystitis @ postr wall  
- neal had to be replaced yesterday due to urinary retention 
- keep neal in for now 
- on flomax Acute on Chronic HFrEF  
- EF 16-20%, NYHA Class IV , hx of VF arrest - s/p AICD 
- Impella insertion 10/29- removed 11/18  
- s/p LVAD placement on 11/18 CKD Stage III  
- Mild Left renal atrophy at baseline Hoarseness, vocal cord paralysis, mediastinal adenopathy: 
- will need eventual PET/CT 
  
L arm clot s/p thrombectomy on 11/25 JOSE on CPAP  
  
PAF s/p DCCV 6/19 
  
Anemia: 
- from blood loss s/p multiple blood products - s/p IV iron,  Repeat iron studies ok 
- on PAVEL q7 d Serratia and Enteroccocus UTI: 
- completed abx Interval History:   
Seen and examined. Cr stable, Na stable. Still w/ edema. Voiding, neal in place. No cp or sob reported. Review of Systems: Pertinent items are noted in HPI. Current Medications:  
Current Facility-Administered Medications Medication Dose Route Frequency  lactobac ac& pc-s.therm-b.anim (TIAN Q/RISAQUAD)  1 Cap Oral DAILY  dronabinol (MARINOL) capsule 5 mg  5 mg Oral ACB&D  
 bumetanide (BUMEX) injection 2 mg  2 mg IntraVENous TID  albumin human 25% (BUMINATE) solution 12.5 g  12.5 g IntraVENous BID  digoxin (LANOXIN) tablet 0.25 mg  0.25 mg Oral DAILY  potassium chloride SR (KLOR-CON 10) tablet 60 mEq  60 mEq Oral TID  cefepime (MAXIPIME) 2 g in 0.9% sodium chloride (MBP/ADV) 100 mL  2 g IntraVENous Q8H  
 magnesium oxide (MAG-OX) tablet 400 mg  400 mg Oral BID  
 oxyCODONE IR (ROXICODONE) tablet 5 mg  5 mg Oral Q6H PRN  mirtazapine (REMERON) tablet 15 mg  15 mg Oral QHS  balsam peru-castor oil (VENELEX) ointment   Topical TID  tamsulosin (FLOMAX) capsule 0.4 mg  0.4 mg Oral DAILY  aspirin chewable tablet 81 mg  81 mg Oral DAILY  sildenafil (antihypertensive) (REVATIO) tablet 20 mg  20 mg Oral TID  pantoprazole (PROTONIX) tablet 40 mg  40 mg Oral ACB  insulin lispro (HUMALOG) injection   SubCUTAneous AC&HS  Warfarin MD/NP dosing   Other PRN  
 epoetin yvonne-epbx (RETACRIT) injection 20,000 Units  20,000 Units SubCUTAneous Q7D  
 lidocaine (LIDODERM) 5 % patch 1 Patch  1 Patch TransDERmal Q24H  
 sodium chloride (NS) flush 5-40 mL  5-40 mL IntraVENous Q8H  
 sodium chloride (NS) flush 5-40 mL  5-40 mL IntraVENous PRN  
 acetaminophen (TYLENOL) tablet 650 mg  650 mg Oral Q6H PRN  
 naloxone (NARCAN) injection 0.4 mg  0.4 mg IntraVENous PRN  
 ondansetron (ZOFRAN) injection 4 mg  4 mg IntraVENous Q4H PRN  
 albuterol-ipratropium (DUO-NEB) 2.5 MG-0.5 MG/3 ML  3 mL Nebulization Q6H PRN  
 senna-docusate (PERICOLACE) 8.6-50 mg per tablet 1 Tab  1 Tab Oral DAILY  polyethylene glycol (MIRALAX) packet 17 g  17 g Oral DAILY  bisacodyl (DULCOLAX) tablet 5 mg  5 mg Oral DAILY PRN  
 sucralfate (CARAFATE) tablet 1 g  1 g Oral AC&HS  influenza vaccine 2019-20 (6 mos+)(PF) (FLUARIX/FLULAVAL/FLUZONE QUAD) injection 0.5 mL  0.5 mL IntraMUSCular PRIOR TO DISCHARGE  hydrALAZINE (APRESOLINE) 20 mg/mL injection 20 mg  20 mg IntraVENous Q6H PRN  
 dextrose 10 % infusion 125-250 mL  125-250 mL IntraVENous PRN  
 milrinone (PRIMACOR) 20 MG/100 ML D5W infusion  0.375 mcg/kg/min IntraVENous TITRATE No Known Allergies Objective: Vitals:   
Vitals:  
 11/29/19 0000 11/29/19 9986 11/29/19 0745 11/29/19 4956 BP:      
Pulse: 92 94 93 Resp: 18 18 18 Temp: 98.7 °F (37.1 °C) 98 °F (36.7 °C) 97.6 °F (36.4 °C) SpO2: 95% 99% 98% Weight:    88 kg (194 lb 0.1 oz) Height:      
 
Intake and Output: 
11/29 0701 - 11/29 1900 In: 290 [P.O.:240; I.V.:50] Out: -  
11/27 1901 - 11/29 0700 In: 3276.7 [P.O.:2040; I.V.:1236.7] Out: 1260 [Urine:6400; Drains:70] Physical Examination: 
 
General: Awake and alert Neck:  Lines + Resp:  Decreased bibasilar breath sounds CV:  VADsounds  2-3+ b/l LE edema GI:  Soft, NT, + Bowel sounds Neurologic:  AAO X3  
:  No neal [x]    High complexity decision making was performed 
[x]    Patient is at high-risk of decompensation with multiple organ involvement Lab Data Personally Reviewed: I have reviewed all the pertinent labs, microbiology data and radiology studies during assessment. Recent Labs  
  11/29/19 0437 11/28/19 0525 11/27/19 0418 11/26/19 
1313 * 132* 134*  --   
K 4.2 4.1 4.1  --   
CL 96* 95* 96*  --   
CO2 32 32 32  --   
* 135* 95  --   
BUN 17 16 20  --   
CREA 1.33* 1.12 1.08  --   
CA 8.6 8.4* 8.3*  --   
MG 2.1 2.0 2.1  --   
PHOS  --   --  3.3  --   
ALB 2.2* 2.2* 2.2*  --   
SGOT 12* 13* 10*  --   
ALT <6* <6* <6*  --   
INR 2.1* 1.9* 2.0* 3.2* Recent Labs  
  11/29/19 0437 11/28/19 0525 11/27/19 
9066 WBC 7.9 7.1 7.0 HGB 8.1* 8.5* 8.9* HCT 26.7* 27.9* 28.6*  
 216 222 No results found for: SDES Lab Results Component Value Date/Time  Culture result: HEAVY POSSIBLE ENTEROCOCCUS SPECIES (A) 11/27/2019 11:10 AM  
 Culture result: NO NORMAL RESPIRATORY TIAN ISOLATED , SO FAR 11/27/2019 11:10 AM  
 Culture result: NO GROWTH 5 DAYS 11/12/2019 11:18 AM  
 Culture result: SERRATIA MARCESCENS (A) 10/31/2019 10:05 AM  
 Culture result: SERRATIA MARCESCENS (2ND COLONY TYPE/STRAIN) (A) 10/31/2019 10:05 AM  
 Culture result: ENTEROCOCCUS FAECALIS GROUP D (A) 10/31/2019 10:05 AM  
 
Recent Results (from the past 24 hour(s)) GLUCOSE, POC Collection Time: 11/28/19 12:03 PM  
Result Value Ref Range Glucose (POC) 101 (H) 65 - 100 mg/dL Performed by Bennett Thornton GLUCOSE, POC Collection Time: 11/28/19  4:39 PM  
Result Value Ref Range Glucose (POC) 117 (H) 65 - 100 mg/dL Performed by Teofilo Mercer GLUCOSE, POC Collection Time: 11/28/19  9:49 PM  
Result Value Ref Range Glucose (POC) 121 (H) 65 - 100 mg/dL Performed by Eduin Ojeda (CON) METABOLIC PANEL, COMPREHENSIVE Collection Time: 11/29/19  4:37 AM  
Result Value Ref Range Sodium 131 (L) 136 - 145 mmol/L Potassium 4.2 3.5 - 5.1 mmol/L Chloride 96 (L) 97 - 108 mmol/L  
 CO2 32 21 - 32 mmol/L Anion gap 3 (L) 5 - 15 mmol/L Glucose 141 (H) 65 - 100 mg/dL BUN 17 6 - 20 MG/DL Creatinine 1.33 (H) 0.70 - 1.30 MG/DL  
 BUN/Creatinine ratio 13 12 - 20 GFR est AA >60 >60 ml/min/1.73m2 GFR est non-AA 53 (L) >60 ml/min/1.73m2 Calcium 8.6 8.5 - 10.1 MG/DL Bilirubin, total 1.1 (H) 0.2 - 1.0 MG/DL  
 ALT (SGPT) <6 (L) 12 - 78 U/L  
 AST (SGOT) 12 (L) 15 - 37 U/L Alk. phosphatase 92 45 - 117 U/L Protein, total 5.4 (L) 6.4 - 8.2 g/dL Albumin 2.2 (L) 3.5 - 5.0 g/dL Globulin 3.2 2.0 - 4.0 g/dL A-G Ratio 0.7 (L) 1.1 - 2.2 MAGNESIUM Collection Time: 11/29/19  4:37 AM  
Result Value Ref Range Magnesium 2.1 1.6 - 2.4 mg/dL LACTIC ACID Collection Time: 11/29/19  4:37 AM  
Result Value Ref Range Lactic acid 0.8 0.4 - 2.0 MMOL/L  
CBC W/O DIFF Collection Time: 11/29/19  4:37 AM  
Result Value Ref Range WBC 7.9 4.1 - 11.1 K/uL  
 RBC 2.69 (L) 4.10 - 5.70 M/uL HGB 8.1 (L) 12.1 - 17.0 g/dL HCT 26.7 (L) 36.6 - 50.3 % MCV 99.3 (H) 80.0 - 99.0 FL  
 MCH 30.1 26.0 - 34.0 PG  
 MCHC 30.3 30.0 - 36.5 g/dL  
 RDW 18.7 (H) 11.5 - 14.5 % PLATELET 457 649 - 364 K/uL MPV 9.4 8.9 - 12.9 FL  
 NRBC 0.0 0  WBC ABSOLUTE NRBC 0.00 0.00 - 0.01 K/uL PROTHROMBIN TIME + INR Collection Time: 11/29/19  4:37 AM  
Result Value Ref Range INR 2.1 (H) 0.9 - 1.1 Prothrombin time 20.3 (H) 9.0 - 11.1 sec PTT Collection Time: 11/29/19  4:37 AM  
Result Value Ref Range aPTT 37.7 (H) 22.1 - 32.0 sec  
 aPTT, therapeutic range     58.0 - 77.0 SECS  
DIGOXIN Collection Time: 11/29/19  4:37 AM  
Result Value Ref Range Digoxin level 1.3 0.90 - 2.00 NG/ML  
LD Collection Time: 11/29/19  4:37 AM  
Result Value Ref Range  (H) 85 - 241 U/L  
NT-PRO BNP Collection Time: 11/29/19  4:37 AM  
Result Value Ref Range NT pro-BNP 13,136 (H) <125 PG/ML  
GLUCOSE, POC Collection Time: 11/29/19  7:30 AM  
Result Value Ref Range Glucose (POC) 110 (H) 65 - 100 mg/dL Performed by Alyx Winter Total time spent with patient:  xxx   min. Care Plan discussed with: 
Patient Family RN Consulting Physician [facility],      
 
I have reviewed the flowsheets. Chart and Pertinent Notes have been reviewed. No change in PMH ,family and social history from Consult note.  
 
 
Umang Anderson MD

## 2019-11-30 NOTE — PROGRESS NOTES
Problem: Self Care Deficits Care Plan (Adult) Goal: *Acute Goals and Plan of Care (Insert Text) Description FUNCTIONAL STATUS PRIOR TO ADMISSION: Patient was modified independent using a single point cane for functional mobility. Patient able to shower and dress himself. However, patient required assistance for household management from his wife. HOME SUPPORT: The patient lived alone with wife to provide assistance. Occupational Therapy Goals: OT weekly reassessment 11/29/19: goals modified below 1. Patient will perform upper body dressing including LVAD switchovers with moderate assistance within 7 day(s). 2.  Patient will perform lower body dressing with minimal assistance within 7 day(s). 3.  Patient will perform grooming in standing with minimum assistance within 7 day(s). 4.  Patient will perform toilet transfers with minimum assistance within 7 day(s). 5.  Patient will perform all aspects of toileting with minimal assistance within 7 day(s). 6.  Patient will participate in upper extremity therapeutic exercise/activities with supervision/set-up for 5 minutes within 7 day(s). 7.  Patient will utilize energy conservation techniques during functional activities with verbal cues within 7 day(s). 8. Patient will verbalize LVAD terminology with verbal cues within 7 day (s). Goals reviewed and continued/modified as follows 11/20/19 s/p LVAD implantation 1. Patient will perform upper body dressing including LVAD switchovers with moderate assistance within 7 day(s). 2.  Patient will perform lower body dressing with minimal assistance within 7 day(s). 3.  Patient will perform grooming with supervision/setup within 7 day(s). 4.  Patient will perform toilet transfers supervision/setupmodified independence within 7 day(s). 5.  Patient will perform all aspects of toileting with minimal assistance within 7 day(s).  
6.  Patient will participate in upper extremity therapeutic exercise/activities with supervision/set-up for 5 minutes within 7 day(s). 7.  Patient will utilize energy conservation techniques during functional activities with verbal cues within 7 day(s). 8. Patient will verbalize LVAD terminology with verbal cues within 7 day (s). Goals reviewed and continued/modified as follows 11/12/19 1. Patient will perform bathing with supervision/set-up within 7 day(s). 2.  Patient will perform lower body dressing with supervision/set-up within 7 day(s). 3.  Patient will perform grooming with modified independence within 7 day(s). 4.  Patient will perform toilet transfers with modified independence within 7 day(s). 5.  Patient will perform all aspects of toileting with supervision/set-up within 7 day(s). 6.  Patient will participate in upper extremity therapeutic exercise/activities with supervision/set-up for 5 minutes within 7 day(s). 7.  Patient will utilize energy conservation techniques during functional activities with verbal cues within 7 day(s). 8. Patient will verbalize LVAD terminology with verbal cues within 7 day (s) in preparation for implant. Continue all goals 10/30/19 post re-eval for Impella removal  
Initiated 10/28/2019 1. Patient will perform bathing with supervision/set-up within 7 day(s). 2.  Patient will perform lower body dressing with supervision/set-up within 7 day(s). 3.  Patient will perform grooming with modified independence within 7 day(s). 4.  Patient will perform toilet transfers with modified independence within 7 day(s). 5.  Patient will perform all aspects of toileting with supervision/set-up within 7 day(s). 6.  Patient will participate in upper extremity therapeutic exercise/activities with supervision/set-up for 5 minutes within 7 day(s). 7.  Patient will utilize energy conservation techniques during functional activities with verbal cues within 7 day(s).  
 
 
 
      
Outcome: Progressing Towards Goal 
 OCCUPATIONAL THERAPY TREATMENT Patient: Donny Shaikh (75 y.o. male) Date: 11/30/2019 Diagnosis: Acute decompensated heart failure ([facility] [address] [I50.9] <principal problem not specified> Procedure(s) (LRB): LEFT BRACHIAL THROMBECTOMY (Left) 5 Days Post-Op Precautions: Fall, Sternal 
Chart, occupational therapy assessment, plan of care, and goals were reviewed. ASSESSMENT Patient continues with skilled OT services and is progressing towards goals. However, patient continues to be limited by decreased endurance, decreased activity tolerance, decreased insight into deficits, decreased insight into LVAD management, impaired standing balance, and with newly acquired sternal precautions s/p LVAD implantation and subsequent L brachial thrombectomy POD 5. Noted patient also with c/o RUE cool to touch and with noted bruising/discoloration (notified RN). Patient required verbal cues for adhering to sternal precautions throughout session today. Patient performed LVAD switchover from PM > batteries x max assist today with repetitive verbal cues for sequencing, patient unaware of LVAD terminology, and patient perseverating on different aspects of switchover making it difficult for him to see the \"big picture\". Patient completed 2 sit <> stand trials with MIN/MOD A x 2 with c/o dizziness and with cues for anterior weight shifting. Patient completed 2/6 BUE cardiac exercises seated. Patient unwilling to complete additional functional activity due to perseveration on switchover and desire to practice. Patient significantly below functional baseline at this time. Continue to recommend IPR to maximize patient safety and independence with ADL transfers and tasks. Current Level of Function Impacting Discharge (ADLs): MAX A switchover; MAX A LB ADLs Other factors to consider for discharge: LVAD HM III 
    
 
PLAN : 
Patient continues to benefit from skilled intervention to address the above impairments. Continue treatment per established plan of care. to address goals. Recommend with staff: OOB x 3 to chair; BUE cardiac exercises Recommend next OT session: standing ADLs Recommendation for discharge: (in order for the patient to meet his/her long term goals) Therapy 3 hours per day 5-7 days per week This discharge recommendation: 
Has been made in collaboration with the attending provider and/or case management IF patient discharges home will need the following DME: to be determined (TBD) SUBJECTIVE:  
Patient stated Wait I don't get this.  OBJECTIVE DATA SUMMARY:  
Cognitive/Behavioral Status: 
Neurologic State: Alert Orientation Level: Oriented X4 Cognition: Appropriate for age attention/concentration Perception: Appears intact Perseveration: No perseveration noted Safety/Judgement: Decreased insight into deficits; Decreased awareness of need for safety;Decreased awareness of need for assistance Functional Mobility and Transfers for ADLs: 
 
 
Transfers: 
Sit to Stand: Minimum assistance; Moderate assistance;Assist x2(min-mod A x2 trial 1, min A x 2 trial 2) Balance: 
Sitting: Intact; Without support Sitting - Static: Good (unsupported) Sitting - Dynamic: Fair (occasional) Standing: Impaired; Without support Standing - Static: Poor;Constant support Standing - Dynamic : Poor;Constant support ADL Intervention: 
  
Patient demonstrated switchover from power module to battery in prep for ADL routine with MAX A . Demonstrated the following tasks:  
 Independent Verbal cues Physical assistance Comments Check PM & SC   x Ensure  clipped onto stabilization belt   x Remove front (green lighted) batteries from , place back batteries into front slots   x Check batteries   x Position batteries into battery clips   x Don holster vest   x Disconnect power leads   x Insert power lead into battery clip   x [place] batteries in holster   x Secure batteries with Velcro and clips   x Upper Body Dressing Assistance Dressing Assistance: Maximum assistance(LVAD management) Cognitive Retraining Safety/Judgement: Decreased insight into deficits; Decreased awareness of need for safety;Decreased awareness of need for assistance The patient instructed on 3/3 sternal precautions. However requiring verbal cues for compliance throughout session. Increase activity tolerance for home, work, and sexual intercourse by pacing self with increasing the arm exercises, sitting duration, frequency OOB, walking, standing, and ADLs. Instructed and indicated understanding of s/s of too much activity, how to respond to s/s safely. Therapeutic Exercises:  
Patient instructed on the benefits and demonstrated cardiac exercises while seated with Supervision. Instructed and indicated understanding on how to progress reps, sets against gravity, working up to 5 lbs, standing and so on based on surgeon clearance for more weight in prep for basic and instrumental ADLs. Instruction on the use of household items in place of weights as needed. CARDIAC EXERCISE Sets Reps Active  Active Assist   
Passive  Self ROM Comments Shoulder flexion Shoulder abduction  1  5  [x]                             []                             []                             []                               
Scapular elevation  1  5  [x]                             []                              []                             []                               
Scapular retraction Trunk rotation Activity Tolerance:  
Fair and c/o dizziness (MAPs 70-65 with Doppler- unable to consistently assess) Please refer to the flowsheet for vital signs taken during this treatment. After treatment patient left in no apparent distress:  
Sitting in chair and Call bell within reach COMMUNICATION/COLLABORATION:  
The patients plan of care was discussed with: Physical Therapist and Registered Nurse Girish Cho Time Calculation: 53 mins

## 2019-11-30 NOTE — PROGRESS NOTES
Problem: Mobility Impaired (Adult and Pediatric) Goal: *Acute Goals and Plan of Care (Insert Text) Description FUNCTIONAL STATUS PRIOR TO ADMISSION: Patient was modified independent using a single point cane for functional mobility. Patient reports an increasingly sedentary lifestyle 2* fatigue and SOB/dyspnea. Retired (so is his wife). Patient reports x 3 falls within the last couple of weeks. Patient is wearing either nasal cannula or CPAP at night. LVAD work-up has been initiated. HOME SUPPORT PRIOR TO ADMISSION: The patient lived with his wife, but did not require physical assistance. Physical Therapy Goals: 
 
Re-evaluation completed 11/20/2019 and new goals formulated following LVAD implantation. 1.  Patient will move from supine to sit and sit to supine, scoot up and down and roll side to side in bed with minimal assistance/contact guard assist within 7 days. 2.  Patient will perform sit to/from stand with minimal assistance/contact guard assist within 7 days. 3.  Patient will ambulate 150 feet with least restrictive assistive device and minimal assistance/contact guard assist within 7 days. 4.  Patient will ascend/descend 4 stairs with handrail(s) with minimal assistance/contact guard assist within 7 days. 5.  Patient will perform cardiac exercises per protocol with supervision within 7 days. 6.  Patient will verbally and functionally recall 3/3 sternal precautions within 7 days. 7.  Patient will perform power exchange for power module to/from battery with moderate assistance  within 7 days. Initiated 10/27/2019; updated 11/15/2019 1. Patient will move from supine to sit and sit to supine, scoot up and down and roll side to side in bed with independence within 7 days. 2.  Patient will perform sit to/from stand with supervision/set-up within 7 days. 3.  Patient will ambulate 200 feet with least restrictive assistive device and supervision/set-up within 7 days. 4.  Patient will ascend/descend 4 stairs with  handrail(s) with supervision/set-up within 7 days for functional strengthening and community reintegration. Lakesha Po 5.  Patient will verbally and functionally recall 3/3 sternal precautions within 7 days in preparation for LVAD implantation. 6.  Patient will perform a mock power exchange for power module to/from battery with supervision/set-up within 7 days in preparation for LVAD implantation. 1.  Patient will move from supine to sit and sit to supine, scoot up and down and roll side to side in bed with independence within 7 days. 2.  Patient will perform sit to/from stand with supervision/set-up within 7 days. 3.  Patient will ambulate 150 feet with least restrictive assistive device and supervision/set-up within 7 days. 4.  Patient will ascend/descend 4 stairs with  handrail(s) with supervision/set-up within 7 days for functional strengthening and community reintegration. Lakesha Po 5.  Patient will verbally and functionally recall 3/3 sternal precautions within 7 days in preparation for LVAD implantation. 6.  Patient will perform a mock power exchange for power module to/from battery with supervision/set-up within 7 days in preparation for LVAD implantation. Outcome: Progressing Towards Goal 
 PHYSICAL THERAPY TREATMENT Patient: Fiona Lama (75 y.o. male) Date: 11/30/2019 Diagnosis: Acute decompensated heart failure ([facility] [address] [I50.9] <principal problem not specified> Procedure(s) (LRB): LEFT BRACHIAL THROMBECTOMY (Left) 5 Days Post-Op Precautions: Fall, Sternal 
Chart, physical therapy assessment, plan of care and goals were reviewed. ASSESSMENT Patient continues with skilled PT services and is progressing towards goals. Patient working with OT upon arrival.  Patient asking questions about LVAD switch over to batteries. Patient appearing very anxious today regarding switch over and had difficulty focusing on mobility tasks. Patient performed sit to stand transfer x 2 successfully with min-mod A x 2 and verbal cues for sternal precautions. Patient needed reminders for sternal precautions throughout session, as he typically wanted to bear weight through his elbows with scooting forward in chair. Patient with c/o of baseline dizziness and worsened during each standing activity. He was able to tolerated standing 20-30 seconds with min A x2. Current Level of Function Impacting Discharge (mobility/balance): min-mod A x 2 for sit to stand, very unsteady in static standing with c/o of dizziness Other factors to consider for discharge: Anxious with LVAD battery switch over, cues for sternal precaution reminders PLAN : 
Patient continues to benefit from skilled intervention to address the above impairments. Continue treatment per established plan of care. to address goals. Recommendation for discharge: (in order for the patient to meet his/her long term goals) Therapy 3 hours per day 5-7 days per week This discharge recommendation: 
Has been made in collaboration with the attending provider and/or case management SUBJECTIVE:  
Patient stated I can try three times.   Patient perseverating on LVAD change over to batteries completed with OT earlier upon arrival. Very anxious regarding batteries, LVAD and changing of systems despite frequent education from OT throughout this session. OBJECTIVE DATA SUMMARY:  
Patient mobilized on continuous portable monitor/telemetry. Critical Behavior: 
Neurologic State: Alert Orientation Level: Oriented X4 Cognition: Appropriate for age attention/concentration, Appropriate decision making, Appropriate safety awareness, Follows commands, Recognition of people/places Safety/Judgement: Awareness of environment Functional Mobility Training: 
Cardiac diagnosis intervention: The patient stated 3/3 sternal precautions when prompted.  Reviewed the \"3 Ps\" with patient. However, needs reminders to adhere to sternal precautions. Bed Mobility: 
Not assessed, patient received sitting upright in chair during OT treatment session Transfers: 
Sit to Stand: Minimum assistance; Moderate assistance;Assist x2(min-mod A x2 trial 1, min A x 2 trial 2) Stand to Sit: Minimum assistance;Assist x2 Balance: 
Sitting: Intact; Without support Sitting - Static: Good (unsupported) Sitting - Dynamic: Fair (occasional) Ambulation/Gait Training: 
 Very unsteady during stand today with c/o of worsening dizziness Therapeutic Exercises:  
Patient instructed on the benefits and demonstrated cardiac exercises while seated. Instructed and indicated understanding on how to progress reps, sets, against gravity, working up through weights and so on based on cardiologist clearance in prep for functional activity. Can use household items for weights. CARDIAC EXERCISE Sets Reps Active Active Assist  
Passive Self ROM Comments Shoulder flexion 1 5 [x]                                            []                                            []                                            []                                              
Shoulder abduction 1  5 [x]                                            []                                            []                                            []                                              
Scapular retraction 1 5 [x]                                            []                                            []                                            []                                              
 
 
Pain Rating: C/o of discomfort at sternal incision Activity Tolerance:  
Fair, Poor, and requires frequent rest breaks Please refer to the flowsheet for vital signs taken during this treatment. After treatment patient left in no apparent distress: Sitting in chair, Call bell within reach, and eating lunch COMMUNICATION/COLLABORATION:  
The patients plan of care was discussed with: Occupational Therapist and Registered Nurse Irais Porter, PT Time Calculation: 20 mins

## 2019-11-30 NOTE — PROGRESS NOTES
Cr and Na stable Voiding well on current diuretics Will f/u on Monday Call with any issues over the weekend Jhonatan Chapa MD 
[address] [place] [address], [facility] Phone - [phone] Fax - [phone] 
www. [facility]

## 2019-11-30 NOTE — PROGRESS NOTES
[address] [facility] [address] in [place], [place] Inpatient Progress Note Patient name: Montserrat Bryant Patient : 1950 Patient MRN: [id number] Attending MD: Angelito Vasquez MD 
Date of service: 19 CHIEF COMPLAINT: 
Heart failure Chief Complaint:  
Cardiogenic Shock  
  
HPI: Sona Kiser is a 71y.o. year old pleasant white male with a history of HTN, HLD, JOSE, CAD s/p cardiac arrest VFib s/p CABG () c/b sternal would infection and sternectomy, ischemic cardiomyopathy LVEF 15-20%, s/p ICD and with LBBB. He was admitted with acute on chronic systolic heart failure with massive volume overload > 20 lbs, in the setting of atrial fibrillation s/p failed DCCV and underwent DCCV and RHC on .  S/p BiVICD on 19 with Dr. Farhad Higgins. He was discharged home home on IV milrinone on 19. Bri Tolbert has been followed closely by Dr. Aimee Couch and the [facility]. 
  
Mr. Kiser was admitted for acute on chronic systolic heart failure. He underwent implantation of Impella 5.0 due to CS and has completed his LVAD evaluation. Bri Tolbert meets criteria for implant of HM3 as DT. He was NYHA Class IV prior to implant of Impella 5.0, has LVEF < 15%, was intolerant of GDMT due to symptomatic hypotension and renal dysfunction. He remains dependent on temporary MCS support. RHC  revealed compensated hemodynamics on Impella. His renal function has improved dramatically with improvement in his hemodynamics. He is not a suitable transplant candidate due to single kidney, sternectomy, debility, and frailty. He was reviewed by Mono Smith and felt to be a high risk heart transplant candidate due to multiple co-morbidities as well as the afore mentioned conditions.  He remained in the CCU and underwent a HM 3 implant as DT on .  
  
24Hr Events S/p left arm thrombectomy Right arm discomfort Cr stable Adequate diuresis Pain improving  
  
Procedure:  Procedure(s): 
 REMOVAL TEMPORARY LEFT VENTRICULAR ASSIST DEVICE, IMPLANTATION OF LEFT VENTRICULAR ASSIST DEVICE PERMANENT (VAD), ECC, JACQUE, EPI AORTIC US BY DR Adrian Combs.    
JGB:  12 
  
  
Impression / Plan:  
Heart Failure Status: NYHA Class IV INTERMACS Category 2 
  
Chronic systolic heart failure due to ICM, NYHA Class IV, EF < 15%, cardiogenic shock bridged with Impella 5.0 support to HM 3 implant S/p Impella removal and LVAD implant Continue milrinone 0.375mcg/kg/min for now Cont Sildenafil - will need PA 
 Decrease IV Bumex to 2 mg BID  + 25% albumin BID No AA/ARB/ARNI post op- will start as appropriate Strict I/O, daily weights, Na+ restricted diet Continue LVAD education Daily dressing changes  
  
Anticoagulation for LVAD, INR goal 2-3 Continue warfarin, ASA Con coumadin tonight 5 mg, INR 2.1 
  
Acute Respiratory Failure post op Resolved Pulmonary hygiene Wean NC for sats > 92% Cont home CPAP - wife bringing back up mask  
  
Post op Pain PRN oxycodone Comfort measures  
  
L Brachial Artery Thrombosis s/p thrombectomy Surgical management per Dr. Rodo Marina On warfarin, INR 2.2 at goal 
Pain improved  
  
Swelling, pain of right arm, acute Doppler studies (-) for thrombus  
Continue [facility] Check subclavian artery ultrasound 
  
CKD 3, atrophic left kidney Appreciate Nephrology assistance Assess diuretic dosing daily IV bumex Avoid nephrotoxins Trend labs ProNTBNP rising despite good diuresis 
  
CAD s/p CABG Cont low dose ASA- watch platelets No BB d/t RV failure Hold statin due to recent hepatic failure [facility] performed 11/13 - low likelihood of benefit from revascularization  
  
Hx of VF arrest s/p BiV-ICD No recent shocks Keep K > 4 and Mg > 2  
   
PAF Currently in ST, Paced Cont PO Amio Digoxin to 25 mcg daily Check EKG today to assess rhythm and QTc 
  
Hypokalemia 
 Klor Con to 60 mEq TID PO  
  
Hx of gout Uric acid WNL   
 Suspected aspiration pneumonia RUL consolidation Suspect aspiration related to over sedation Limit sedatives Sputum culture Cefepime 2g q8h x 7 days total 
  
Urinary retention, c/b serratia UTI and hematuria Appreciate Urology consult Cystoscopy performed 11/13 shows catheter induced cystitis Repeat UA shows resolution of UTI  
  
Malnutrition Appreciate Nutritionist consult Prealbumin down to 7.6 Continue Marinol 5 mg PO BID 
6 small meals daily Continue mirtazapine - watch Na+ 
  
COPD Appreciate Pulmonology assistance Continue nebs PRN   
PFTs complete 
  
Anemia Multifactorial, due to hemolysis + epistaxis + hematuria Intolerant of octreotide or doxycycline for AVM ppx due to prolonged QTc Transfuse for Hgb goal > 8 
  
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  
  
Hx of sternal wound infection, sternectomy Sternum closed post op- monitor  
  
Vocal cord paralysis Improved Speech therapy following May need MBS 
  
Debility Continue PT/OT  
  
Ppx Protonix PT/OT Will continue cefepime for 2 weeks post op per Dr. Ross Claude for Impella prophylaxis Bowel regimen Cont Mechanical soft PICC placed 
  
Dispo: 
Likely will need IP rehab LVAD INTERROGATION: 
Device interrogated in person Device function normal, normal flow, no events LVAD Pump Speed (RPM): 5200 Pump Flow (LPM): 4.5 MAP: 84 
PI (Pulsitility Index): 3.7 Power: 3.7  Test: Yes 
Back Up  at Bedside & Labeled: Yes Power Module Test: Yes Driveline Site Care: No 
Driveline Dressing: Changed per order Outpatient: No 
MAP in Therapeutic Range (Outpatient): Yes Testing Alarms Reviewed: Yes 
Back up SC speed: 5200 Back up Low Speed Limit: 4800 Emergency Equipment with Patient?: Yes Emergency procedures reviewed?: Yes Drive line site inspected?: Yes Drive line intergrity inspected?: Yes Drive line dressing changed?: No 
 
PHYSICAL EXAM: 
Visit Vitals BP (!) 74/56 Pulse 94 Temp 97.9 °F (36.6 °C) Resp 18 Ht 6' 2\" (1.88 m) Wt 186 lb 4.6 oz (84.5 kg) SpO2 95% BMI 23.92 kg/m² General: Patient is well developed, well-nourished in no acute distress HEENT: Normocephalic and atraumatic. No scleral icterus. Pupils are equal, round and reactive to light and accomodation. No conjunctival injection. Oropharynx is clear. Neck: Supple. No evidence of thyroid enlargements or lymphadenopathy. JVD: Cannot be appreciated Lungs: Breath sounds are equal and clear bilaterally. No wheezes, rhonchi, or rales. Heart: Regular rate and rhythm with normal S1 and S2. No murmurs, gallops or rubs. Abdomen: Soft, no mass or tenderness. No organomegaly or hernia. Bowel sounds present. Genitourinary and rectal: deferred Extremities: No cyanosis, clubbing, 2+ BLE edema. Neurologic: No focal sensory or motor deficits are noted. Grossly intact. Psychiatric: Awake, alert an doriented x 3. Appropriate mood and affect. Skin: Warm, dry and well perfused. No lesions, nodules or rashes are noted. REVIEW OF SYSTEMS: 
General: Denies fever, night sweats. Ear, nose and throat: Denies difficulty hearing, sinus problems, runny nose, post-nasal drip, ringing in ears, mouth sores, loose teeth, ear pain, nosebleeds, sore throate, facial pain or numbess Cardiovascular: see above in the interval history Respiratory: Denies cough, wheezing, sputum production, hemoptysis. Gastrointestinal: Denies heartburn, constipation, intolerance to certain foods, diarrhea, abdominal pain, nausea, vomiting, difficulty swallowing, blood in stool Kidney and bladder: Denies painful urination, frequent urination, urgency, prostate problems and impotence Musculoskeletal: Denies joint pain, muscle weakness Skin and hair: Denies change in existing skin lesions, hair loss or increase, breast changes PAST MEDICAL HISTORY: 
Past Medical History:  
Diagnosis Date  Degenerative disc disease, lumbar  Heart failure ([facility] [address]  High cholesterol  Hypertension  Paroxysmal atrial fibrillation ([facility] [address] 2019  Spinal stenosis PAST SURGICAL HISTORY: 
Past Surgical History:  
Procedure Laterality Date  COLONOSCOPY Left 2019 COLONOSCOPY at bedside performed by Sid Ray MD at [address]  HX CORONARY ARTERY BYPASS GRAFT    
 triple  HX HERNIA REPAIR    
 HX IMPLANTABLE CARDIOVERTER DEFIBRILLATOR  MS CARDIOVERSION ELECTIVE ARRHYTHMIA EXTERNAL N/A 6/10/2019 EP CARDIOVERSION performed by Louann Valdivia MD at [address], [facility] CATH LAB  MS CARDIOVERSION ELECTIVE ARRHYTHMIA EXTERNAL N/A 2019 EP CARDI[facility]ERSION [facility] [address], [facility] CATH LAB  MS INSJ/RPLCMT PERM DFB W/TRNSVNS LDS 1/DUAL CHMBR N/A 2019 INSERT ICD BIV MULTI performed by Marii Rey MD at [address] [place] [address], [facility] CATH LAB  MS TCAT IMPL WRLS P-ART PRS SNR L-T HEMODYN MNTR N/A 2019 IMPLANT HEART FAILURE MONITORING DEVICE performed [facility] [address] [address] CATH LAB FAMILY HISTORY: 
Family History Problem Relation Age of Onset  Lung Disease Mother  Hypertension Mother Canseco Arthritis-osteo Mother  Heart Disease Mother  Heart Disease Father  Diabetes Father SOCIAL HISTORY: 
Social History Socioeconomic History  Marital status:  Spouse name: Not on file  Number of children: Not on file  Years of education: Not on file  Highest education level: Not on file Tobacco Use  Smoking status: Former Smoker Last attempt to quit: 2010 Years since quittin.0  Smokeless tobacco: Never Used Substance and Sexual Activity  Alcohol use: Not Currently Comment: rarely  Drug use: Never Other Topics Concern LABORATORY RESULTS: 
  
Labs Latest Ref Rng & Units 2019 11/26/2019 11/25/2019 11/25/2019 WBC 4.1 - 11.1 K/uL 7.3 7.9 7.1 7.0 7.5 - -  
RBC 4.10 - 5.70 M/uL 2.82(L) 2.69(L) 2.83(L) 2.91(L) 3.13(L) - - Hemoglobin 12.1 - 17.0 g/dL 8.4(L) 8. 1(L) 8.5(L) 8. 9(L) 9.4(L) - - Hematocrit 36.6 - 50.3 % 27. 9(L) 26. 7(L) 27. 9(L) 28. 6(L) 30. 9(L) - - MCV 80.0 - 99.0 FL 98.9 99. 3(H) 98.6 98.3 98.7 - - Platelets 773 - 310 K/uL 244 219 216 222 194 - - Lymphocytes 12 - 49 % - - - - - - - Monocytes 5 - 13 % - - - - - - - Eosinophils 0 - 7 % - - - - - - - Basophils 0 - 1 % - - - - - - - Albumin 3.5 - 5.0 g/dL 2. 4(L) 2. 2(L) 2. 2(L) 2. 2(L) 2. 1(L) - -  
Calcium 8.5 - 10.1 MG/DL 8.7 8.6 8.4(L) 8. 3(L) 8.6 - -  
SGOT 15 - 37 U/L 15 12(L) 13(L) 10(L) 11(L) - -  
Glucose 65 - 100 mg/dL 118(H) 141(H) 135(H) 95 161(H) - -  
BUN 6 - 20 MG/DL 19 17 16 20 20 - - Creatinine 0.70 - 1.30 MG/DL 1.55(H) 1.33(H) 1.12 1.08 1.10 - - Sodium 136 - 145 mmol/L 134(L) 131(L) 132(L) 134(L) 134(L) - - Potassium 3.5 - 5.1 mmol/L 4.1 4.2 4.1 4.1 4.4 - 3. 4(L) TSH 0.36 - 3.74 uIU/mL - - - - - - -  
PSA 0.01 - 4.0 ng/mL - - - - - - -  
LDH 85 - 241 U/L 256(H) 246(H) 218 230 235 258(H) -  
CEA ng/mL - - - - - - - Some recent data might be hidden Lab Results Component Value Date/Time TSH 2.40 10/25/2019 07:39 PM  
 TSH 2.45 06/01/2019 04:16 AM  
 
 
ALLERGY: 
No Known Allergies CURRENT MEDICATIONS: 
 
Current Facility-Administered Medications:  
  lactobac ac& pc-s.therm-b.anim (TIAN Q/RISAQUAD), 1 Cap, Oral, DAILY, Geovanna Lee MD, 1 Cap at 11/30/19 2291   dronabinol (MARINOL) capsule 5 mg, 5 mg, Oral, ACB&D, Polliard, Salina Lunch T, NP, 5 mg at 11/30/19 1501   bumetanide (BUMEX) injection 2 mg, 2 mg, IntraVENous, TID, Polliard, Salina Lunch T, NP, 2 mg at 11/30/19 7517   albumin human 25% (BUMINATE) solution 12.5 g, 12.5 g, IntraVENous, BID, Polliard, Salina Lunch T, NP, 12.5 g at 11/30/19 0757   digoxin (LANOXIN) tablet 0.25 mg, 0.25 mg, Oral, DAILY, Polliard, Salina Lunch T, NP, 0.25 mg at 11/30/19 0850   potassium chloride SR (KLOR-CON 10) tablet 60 mEq, 60 mEq, Oral, TID, Garo Herrera NP, 60 mEq at 11/30/19 4030   cefepime (MAXIPIME) 2 g in 0.9% sodium chloride (MBP/ADV) 100 mL, 2 g, IntraVENous, Q8H, Jamee Herrera T, NP, Last Rate: 200 mL/hr at 11/30/19 0230, 2 g at 11/30/19 0230 
  magnesium oxide (MAG-OX) tablet 400 mg, 400 mg, Oral, BID, Faby Tejada E, NP, 400 mg at 11/30/19 0850 
  oxyCODONE IR (ROXICODONE) tablet 5 mg, 5 mg, Oral, Q6H PRN, Dylan Lombardo MD, 5 mg at 11/30/19 7098   mirtazapine (REMERON) tablet 15 mg, 15 mg, Oral, QHS, Faby Tejada, NP, 15 mg at 11/29/19 2147   balsam peru-castor oil (VENELEX) ointment, , Topical, TID, Rivas Andrews MD 
  [facility]) capsule 0.4 mg, 0.4 mg, Oral, DAILY, Logan Kincaid MD, 0.4 mg at 11/30/19 2583   aspirin chewable tablet 81 mg, 81 mg, Oral, DAILY, Levi, Shana B, NP, 81 mg at 11/30/19 2863   sildenafil (antihypertensive) (REVATIO) tablet 20 mg, 20 mg, Oral, TID, Lvei, Shana B, NP, 20 mg at 11/30/19 6539   pantoprazole (PROTONIX) tablet 40 mg, 40 mg, Oral, ACB, Levi, Shana B, NP, 40 mg at 11/30/19 8330   insulin lispro (HUMALOG) injection, , SubCUTAneous, AC&HS, Levi, Shana B, NP, Stopped at 11/27/19 1630   Warfarin MD/NP dosing, , Other, PRN, Mounika Atlman MD 
  epoetin yvonne-epbx (RETACRIT) injection 20,000 Units, 20,000 Units, SubCUTAneous, Q7D, Tiara Boudreaux MD, 20,000 Units at 11/26/19 0292   lidocaine (LIDODERM) 5 % patch 1 Patch, 1 Patch, TransDERmal, Q24H, Shana Sims NP, 1 Patch at 11/29/19 2039   sodium chloride (NS) flush 5-40 mL, 5-40 mL, IntraVENous, Q8H, Dylan Lombardo MD, 10 mL at 11/30/19 0363   sodium chloride (NS) flush 5-40 mL, 5-40 mL, IntraVENous, PRN, Dylan Lombardo MD 
  acetaminophen (TYLENOL) tablet 650 mg, 650 mg, Oral, Q6H PRN, Dylan Lombardo MD, 650 mg at 11/28/19 9792   naloxone (NARCAN) injection 0.4 mg, 0.4 mg, IntraVENous, PRN, Brittayn Roman MD 
  ondansetron [facility]) injection 4 mg, 4 mg, IntraVENous, Q4H PRN, Brittany Roman MD, 4 mg at 11/20/19 2000 
  albuterol-ipratropium (DUO-NEB) 2.5 MG-0.5 MG/3 ML, 3 mL, Nebulization, Q6H PRN, Brittany Roman MD, 3 mL at 11/30/19 0526 
  senna-docusate (PERICOLACE) 8.6-50 mg per tablet 1 Tab, 1 Tab, Oral, DAILY, Brittany Roman MD, 1 Tab at 11/30/19 4986   polyethylene glycol (MIRALAX) packet 17 g, 17 g, Oral, DAILY, Brittany Roman MD, 17 g at 11/30/19 1822   bisacodyl (DULCOLAX) tablet 5 mg, 5 mg, Oral, DAILY PRN, Brittany Roman MD 
  sucralfate (CARAFATE) tablet 1 g, 1 g, Oral, AC&HS, Brittany Roman MD, 1 g at 11/30/19 3354   influenza vaccine 2019-20 (6 mos+)(PF) (FLUARIX/FLULAVAL/FLUZONE QUAD) injection 0.5 mL, 0.5 mL, IntraMUSCular, PRIOR TO DISCHARGE, Mounika Altman MD 
  hydrALAZINE (APRESOLINE) 20 mg/mL injection 20 mg, 20 mg, IntraVENous, Q6H PRN, Shana Sims NP, 20 mg at 11/19/19 0547 
  dextrose 10 % infusion 125-250 mL, 125-250 mL, IntraVENous, PRN, Mounika Altman MD, Stopped at 11/18/19 0700 
  milrinone (PRIMACOR) 20 MG/100 ML D5W infusion, 0.375 mcg/kg/min, IntraVENous, TITRATE, Mounika Altman MD, Last Rate: 10 mL/hr at 11/30/19 0225, 0.375 mcg/kg/min at 11/30/19 0225 Thank you for allowing me to participate in this patient's care. Jazmyne Graf MD PhD 
[facility] [address] [place] [address], [facility] [address] Phone: [phone] Fax: [phone]

## 2019-11-30 NOTE — PROGRESS NOTES
. 
[facility] VAD/Heart Failure Progress Note Admit Date: 10/25/2019 POD:  5 Days Post-Op Procedure:  Procedure(s): LEFT BRACHIAL THROMBECTOMY Subjective:  
Mild dyspnea, fatigue, and weakness; insomnia a little better Objective:  
Vitals: 
Blood pressure (!) 74/56, pulse (!) 101, temperature 99.2 °F (37.3 °C), resp. rate 20, height 6' 2\" (1.88 m), weight 186 lb 4.6 oz (84.5 kg), SpO2 100 %. Temp (24hrs), Av.8 °F (37.1 °C), Min:97.9 °F (36.6 °C), Max:99.6 °F (37.6 °C) Hemodynamics: 
 CO: CO (l/min): 5.4 l/min CI: CI (l/min/m2): 2.5 l/min/m2 CVP: CVP (mmHg): 7 mmHg (19 0930) SVR: SVR (dyne*sec)/cm5: 1067 (dyne*sec)/cm5 (19 1200) PAP Systolic: PAP Systolic: 50 (23//25 5049) PAP Diastolic: PAP Diastolic: 30 (58/31/33 6792) PVR:   
 SV02: SVO2 (%): 45 % (19 1200) SCV02: SCVO2 (%): 75 % (10/29/19 1900) VAD Interrogation: LVAD (Heartmate) Pump Speed (RPM): 5200 Pump Flow (LPM): 4.5 PI (Pulsitility Index): 3.7 Power: 3.7 MAP: 78  Test: Yes 
Back Up  at Bedside & Labeled: Yes Power Module Test: Yes Driveline Site Care: No 
Driveline Dressing: Changed per order EKG/Rhythm:   
 
Extubation Date / Time:  
 
CT Output:  
 
Ventilator: 
Ventilator Volumes Vt Set (ml): 550 ml (19 08) Vt Exhaled (Machine Breath) (ml): 639 ml (19 08) Vt Spont (ml): 437 ml (19 1035) Ve Observed (l/min): 7.25 l/min (19 1035) Oxygen Therapy: 
Oxygen Therapy O2 Sat (%): 100 % (19 122) Pulse via Oximetry: 98 beats per minute (19 0527) O2 Device: Nasal cannula (19 122) O2 Flow Rate (L/min): 4 l/min (19 1222) FIO2 (%): 50 % (19 103) CXR: 
 
Admission Weight: Last Weight Weight: 192 lb 10.9 oz (87.4 kg) Weight: 186 lb 4.6 oz (84.5 kg) Intake / Output / Drain: 
Current Shift: 701 -  1900 In: 240 [P.O.:240] Out: 650 [Urine:650] Last 24 hrs.:  
 
Intake/Output Summary (Last 24 hours) at 11/30/2019 1500 Last data filed at 11/30/2019 1222 Gross per 24 hour Intake 720 ml Output 4300 ml Net -3580 ml No results for input(s): CPK, CKMB, TROIQ in the last 72 hours. Recent Labs 11/30/19 
0424 11/29/19 
0510 11/28/19 
0931 * 131* 132* K 4.1 4.2 4.1  
CO2 33* 32 32 BUN 19 17 16 CREA 1.55* 1.33* 1.12  
* 141* 135* MG 2.2 2.1 2.0 WBC 7.3 7.9 7.1 HGB 8.4* 8.1* 8.5* HCT 27.9* 26.7* 27.9*  
 219 216 Recent Labs 11/30/19 
0424 11/29/19 
6399 11/28/19 
1442 INR 2.2* 2.1* 1.9* PTP 21.8* 20.3* 18.8* APTT 37.0* 37.7* 36.3* No lab exists for component: PBNP Current Facility-Administered Medications:  
  bumetanide (BUMEX) injection 2 mg, 2 mg, IntraVENous, BID, Mavis Carrizales MD 
  warfarin (COUMADIN) tablet 5 mg, 5 mg, Oral, ONCE, Mavis Carrizales MD 
  lactobac ac& pc-s.therm-b.anim (TIAN Hollingsworth), 1 Cap, Oral, DAILY, Mavis Carrizales MD, 1 Cap at 11/30/19 0102   dronabinol (MARINOL) capsule 5 mg, 5 mg, Oral, ACB&D, Polliard, Madeleine Hathaways T, NP, 5 mg at 11/30/19 8150   albumin human 25% (BUMINATE) solution 12.5 g, 12.5 g, IntraVENous, BID, PolliardMadeleines T, NP, 12.5 g at 11/30/19 4230   digoxin (LANOXIN) tablet 0.25 mg, 0.25 mg, Oral, DAILY, Polliard, Madeleine Thomas T, NP, 0.25 mg at 11/30/19 3491   potassium chloride SR (KLOR-CON 10) tablet 60 mEq, 60 mEq, Oral, TID, Andrés Herrera, NP, 60 mEq at 11/30/19 2780   cefepime (MAXIPIME) 2 g in 0.9% sodium chloride (MBP/ADV) 100 mL, 2 g, IntraVENous, Q8H, Madeleine Herrera NP, Last Rate: 200 mL/hr at 11/30/19 1215, 2 g at 11/30/19 1215 
  magnesium oxide (MAG-OX) tablet 400 mg, 400 mg, Oral, BID, Magdatim Mendez E, NP, 400 mg at 11/30/19 0850 
  oxyCODONE IR (ROXICODONE) tablet 5 mg, 5 mg, Oral, Q6H PRN, Omari Barba MD, 5 mg at 11/30/19 4412   mirtazapine (REMERON) tablet 15 mg, 15 mg, Oral, QHS, Jason Johns, Wali Mercedes NP, 15 mg at 11/29/19 2147   balsam peru-castor oil (VENELEX) ointment, , Topical, TID, Rian Andrews MD 
  tamsulosin [facility]) capsule 0.4 mg, 0.4 mg, Oral, DAILY, Logan Kincaid MD, 0.4 mg at 11/30/19 7650   aspirin chewable tablet 81 mg, 81 mg, Oral, DAILY, Levi, Shana B, NP, 81 mg at 11/30/19 6355   sildenafil (antihypertensive) (REVATIO) tablet 20 mg, 20 mg, Oral, TID, Levi, Shana B, NP, 20 mg at 11/30/19 9917   pantoprazole (PROTONIX) tablet 40 mg, 40 mg, Oral, ACB, Levi, Shana B, NP, 40 mg at 11/30/19 2597   insulin lispro (HUMALOG) injection, , SubCUTAneous, AC&HS, Levi, Shana B, NP, Stopped at 11/27/19 1630   Warfarin MD/NP dosing, , Other, PRN, Mounika Altman MD 
  epoetin yvonne-epbx (RETACRIT) injection 20,000 Units, 20,000 Units, SubCUTAneous, Q7D, Kallie Gauthier MD, 20,000 Units at 11/26/19 0042   lidocaine (LIDODERM) 5 % patch 1 Patch, 1 Patch, TransDERmal, Q24H, Levi Shana B, NP, 1 Patch at 11/29/19 2039   sodium chloride (NS) flush 5-40 mL, 5-40 mL, IntraVENous, Q8H, Jami Orta MD, 10 mL at 11/30/19 4905   sodium chloride (NS) flush 5-40 mL, 5-40 mL, IntraVENous, PRN, Jami Orta MD 
  acetaminophen (TYLENOL) tablet 650 mg, 650 mg, Oral, Q6H PRN, Jami Orta MD, 650 mg at 11/28/19 2112 
  naloxone (NARCAN) injection 0.4 mg, 0.4 mg, IntraVENous, PRN, Jami Orta MD 
  [facility]) injection 4 mg, 4 mg, IntraVENous, Q4H PRN, Jami Orta MD, 4 mg at 11/20/19 2000 
  albuterol-ipratropium (DUO-NEB) 2.5 MG-0.5 MG/3 ML, 3 mL, Nebulization, Q6H PRN, Jami Orta MD, 3 mL at 11/30/19 0526 
  senna-docusate (PERICOLACE) 8.6-50 mg per tablet 1 Tab, 1 Tab, Oral, DAILY, Jami Orta MD, 1 Tab at 11/30/19 1098   polyethylene glycol (MIRALAX) packet 17 g, 17 g, Oral, DAILY, Jami Orta MD, 17 g at 11/30/19 3904   bisacodyl (DULCOLAX) tablet 5 mg, 5 mg, Oral, DAILY PRN, Divine Morales MD 
  sucralfate (CARAFATE) tablet 1 g, 1 g, Oral, AC&HS, Divine Morales MD, 1 g at 11/30/19 0927   influenza vaccine 2019-20 (6 mos+)(PF) (FLUARIX/FLULAVAL/FLUZONE QUAD) injection 0.5 mL, 0.5 mL, IntraMUSCular, PRIOR TO DISCHARGE, Mounika Altman MD 
  hydrALAZINE (APRESOLINE) 20 mg/mL injection 20 mg, 20 mg, IntraVENous, Q6H PRN, Shana Sims NP, 20 mg at 11/19/19 0547 
  dextrose 10 % infusion 125-250 mL, 125-250 mL, IntraVENous, PRN, Mounika Altman MD, Stopped at 11/18/19 0700 
  milrinone (PRIMACOR) 20 MG/100 ML D5W infusion, 0.375 mcg/kg/min, IntraVENous, TITRATE, Mounika Altman MD, Last Rate: 10 mL/hr at 11/30/19 0225, 0.375 mcg/kg/min at 11/30/19 0225 A/P 
  
S/P LVAD - good flows Need for anti-coagulation - coumadin DM - insulin RV dysfunction - milrinone 
  
Risk of morbidity and mortality - high Medical decision making - high complexity Signed By: Lisa Lucas MD

## 2019-11-30 NOTE — PROGRESS NOTES
1930: Bedside shift change report given to Joan Rizvi (oncoming nurse) by Chester Madera (offgoing nurse). Report included the following information SBAR, Kardex, Intake/Output, MAR, Recent Results, and Cardiac Rhythm afib/paced . 0100: Pt c/o SOB. O2 sats 88% on 2L. Increased to 3 L, O2 increased to 94%. 0200:  Pt O2 88%. Increased to 4L. O2 sats 94%. 0230:  O2 sats 89%. Pt agreeable to wearing CPAP.  4L O2 bled into CPAP. O2 sats 94-98%. 0450:  Pt c/o SOB. CPAP mask has continually slipped down nose throughout the night. Requested to get into recliner. Pt reports feeling much better sitting in chair. Very DOUGLAS. O2 sats 96% on 4L. Asked RT to give a breathing tx per pt request. 
 
Problem: Falls - Risk of 
Goal: *Absence of Falls Description Document Vera Latin Fall Risk and appropriate interventions in the flowsheet. Outcome: Progressing Towards Goal 
Note: Fall Risk Interventions: 
Mobility Interventions: Communicate number of staff needed for ambulation/transfer, Patient to call before getting OOB, OT consult for ADLs, PT Consult for mobility concerns Mentation Interventions: Door open when patient unattended Medication Interventions: Patient to call before getting OOB, Teach patient to arise slowly Elimination Interventions: Call light in reach, Patient to call for help with toileting needs History of Falls Interventions: Room close to nurse's station Problem: Pressure Injury - Risk of 
Goal: *Prevention of pressure injury Description Document Mich Scale and appropriate interventions in the flowsheet. Outcome: Progressing Towards Goal 
Note: Pressure Injury Interventions: 
Sensory Interventions: Avoid rigorous massage over bony prominences, Chair cushion, Discuss PT/OT consult with provider, Keep linens dry and wrinkle-free, Maintain/enhance activity level, Minimize linen layers, Monitor skin under medical devices, Pressure redistribution bed/mattress (bed type) Moisture Interventions: Contain wound drainage, Minimize layers Activity Interventions: Increase time out of bed, Pressure redistribution bed/mattress(bed type), PT/OT evaluation Mobility Interventions: Pressure redistribution bed/mattress (bed type), PT/OT evaluation Nutrition Interventions: Document food/fluid/supplement intake, Offer support with meals,snacks and hydration Friction and Shear Interventions: Apply protective barrier, creams and emollients, Lift sheet Problem: LVAD Post-op Day 8 to day 14 Goal: *Hemodynamically stable Outcome: Progressing Towards Goal 
Note:  
VSS Goal: *Optimal pain control at patient's stated goal 
Outcome: Progressing Towards Goal 
Note:  
No c/o pain. Goal: *Incisions without signs and symptoms of wound complication Outcome: Progressing Towards Goal 
Note:  
No s/s of wound complications

## 2019-12-01 NOTE — PROGRESS NOTES
[facility] VAD/Heart Failure Progress Note Admit Date: 10/25/2019 POD:  6 Days Post-Op Procedure:  Procedure(s): LEFT BRACHIAL THROMBECTOMY Subjective:  
Mild dyspnea, fatigue, and weakness; bilateral arm pain; duplex looks reasonable Objective:  
Vitals: 
Blood pressure (!) 74/56, pulse 88, temperature 98.5 °F (36.9 °C), resp. rate 18, height 6' 2\" (1.88 m), weight 185 lb 6.5 oz (84.1 kg), SpO2 94 %. Temp (24hrs), Av.4 °F (36.9 °C), Min:97.8 °F (36.6 °C), Max:98.9 °F (37.2 °C) Hemodynamics: 
 CO: CO (l/min): 5.4 l/min CI: CI (l/min/m2): 2.5 l/min/m2 CVP: CVP (mmHg): 7 mmHg (19 0930) SVR: SVR (dyne*sec)/cm5: 1067 (dyne*sec)/cm5 (19 1200) PAP Systolic: PAP Systolic: 50 (47 2381) PAP Diastolic: PAP Diastolic: 30 ( 9959) PVR:   
 SV02: SVO2 (%): 45 % (19 1200) SCV02: SCVO2 (%): 75 % (10/29/19 1900) VAD Interrogation: LVAD (Heartmate) Pump Speed (RPM): 7103 Pump Flow (LPM): 4.5 PI (Pulsitility Index): 3.4 Power: 3.6 MAP: 78  Test: Yes 
Back Up  at Bedside & Labeled: Yes Power Module Test: Yes Driveline Site Care: No 
Driveline Dressing: Clean, Dry, and Intact EKG/Rhythm:   
 
Extubation Date / Time:  
 
CT Output:  
 
Ventilator: 
Ventilator Volumes Vt Set (ml): 550 ml (19 08) Vt Exhaled (Machine Breath) (ml): 639 ml (19 0826) Vt Spont (ml): 437 ml (19 1035) Ve Observed (l/min): 7.25 l/min (19 1035) Oxygen Therapy: 
Oxygen Therapy O2 Sat (%): 94 % (19 114) Pulse via Oximetry: 98 beats per minute (19) O2 Device: Nasal cannula (19 114) O2 Flow Rate (L/min): 3 l/min (19 114) FIO2 (%): 50 % (19) CXR: 
 
Admission Weight: Last Weight Weight: 192 lb 10.9 oz (87.4 kg) Weight: 185 lb 6.5 oz (84.1 kg) Intake / Output / Drain: 
Current Shift: 701 - 1900 In: -  
Out: 742 [CTYOT:392] Last 24 hrs.:  
 
Intake/Output Summary (Last 24 hours) at 12/1/2019 1356 Last data filed at 12/1/2019 1146 Gross per 24 hour Intake 200 ml Output 1850 ml Net -1650 ml No results for input(s): CPK, CKMB, TROIQ in the last 72 hours. Recent Labs 12/01/19 
0321 11/30/19 
0424 11/29/19 
7137 * 134* 131* K 4.5 4.1 4.2 CO2 32 33* 32 BUN 20 19 17 CREA 1.65* 1.55* 1.33* GLU 92 118* 141* MG 2.4 2.2 2.1 WBC 7.4 7.3 7.9 HGB 7.3* 8.4* 8.1* HCT 24.0* 27.9* 26.7*  
 244 219 Recent Labs 12/01/19 
0321 11/30/19 
0424 11/29/19 
8061 INR 2.3* 2.2* 2.1* PTP 22.0* 21.8* 20.3* APTT 36.9* 37.0* 37.7* No lab exists for component: PBNP Current Facility-Administered Medications:  
  bumetanide (BUMEX) injection 1 mg, 1 mg, IntraVENous, BID, Momo Harper MD, 1 mg at 12/01/19 9246   cefepime (MAXIPIME) 2 g in 0.9% sodium chloride (MBP/ADV) 100 mL, 2 g, IntraVENous, Q12H, Bipin Herrera NP 
  lactobac ac& pc-s.therm-b.anim (TIAN Q/RISAQUAD), 1 Cap, Oral, DAILY, Andrey Mari MD, 1 Cap at 12/01/19 0533   dronabinol (MARINOL) capsule 5 mg, 5 mg, Oral, ACB&D, Pollliborio, Oksana Escort T, NP, 5 mg at 12/01/19 0747 
  albumin human 25% (BUMINATE) solution 12.5 g, 12.5 g, IntraVENous, BID, Sharon, Oksana Escort T, NP, 12.5 g at 12/01/19 1969   digoxin (LANOXIN) tablet 0.25 mg, 0.25 mg, Oral, DAILY, Polliard, Oksana Escort T, NP, 0.25 mg at 12/01/19 6296   potassium chloride SR (KLOR-CON 10) tablet 60 mEq, 60 mEq, Oral, TID, Katelynnd, Oksana Escort T, NP, 60 mEq at 12/01/19 0816 
  magnesium oxide (MAG-OX) tablet 400 mg, 400 mg, Oral, BID, Nallely Husbands, NP, 400 mg at 12/01/19 8043   oxyCODONE IR (ROXICODONE) tablet 5 mg, 5 mg, Oral, Q6H PRN, Jemma De Anda MD, 5 mg at 11/30/19 6011   mirtazapine (REMERON) tablet 15 mg, 15 mg, Oral, QHS, Nallely Husbands, NP, 15 mg at 11/30/19 6139   balsam peru-castor oil (VENELEX) ointment, , Topical, TID, Fiser, Jimenez Johnson MD 
  tamsulosin [facility]) capsule 0.4 mg, 0.4 mg, Oral, DAILY, Logan Kincaid MD, 0.4 mg at 12/01/19 5498   aspirin chewable tablet 81 mg, 81 mg, Oral, DAILY, Levi, Shana B, NP, 81 mg at 12/01/19 0816 
  sildenafil (antihypertensive) (REVATIO) tablet 20 mg, 20 mg, Oral, TID, Levi, Shana B, NP, 20 mg at 12/01/19 0816 
  pantoprazole (PROTONIX) tablet 40 mg, 40 mg, Oral, ACB, Levi, Shana B, NP, 40 mg at 12/01/19 0747 
  insulin lispro (HUMALOG) injection, , SubCUTAneous, AC&HS, Levi, Shana B, NP, Stopped at 11/27/19 1630   Warfarin MD/NP dosing, , Other, PRN, Mounika Altman MD 
  epoetin yvonne-epbx (RETACRIT) injection 20,000 Units, 20,000 Units, SubCUTAneous, Q7D, Donny Galavzi MD, 20,000 Units at 11/26/19 7330   lidocaine (LIDODERM) 5 % patch 1 Patch, 1 Patch, TransDERmal, Q24H, Levi Shana B, NP, 1 Patch at 11/29/19 2039   sodium chloride (NS) flush 5-40 mL, 5-40 mL, IntraVENous, Q8H, Harshal Solares MD, 10 mL at 12/01/19 0747 
  sodium chloride (NS) flush 5-40 mL, 5-40 mL, IntraVENous, PRN, Brittany Roman MD 
  acetaminophen (TYLENOL) tablet 650 mg, 650 mg, Oral, Q6H PRN, Brittany Roman MD, 650 mg at 11/28/19 2112 
  naloxone (NARCAN) injection 0.4 mg, 0.4 mg, IntraVENous, PRN, Brittany Roman MD 
  ondansetron [facility]) injection 4 mg, 4 mg, IntraVENous, Q4H PRN, Brittany Roman MD, 4 mg at 11/20/19 2000 
  albuterol-ipratropium (DUO-NEB) 2.5 MG-0.5 MG/3 ML, 3 mL, Nebulization, Q6H PRN, Brittany Roman MD, 3 mL at 11/30/19 0526 
  senna-docusate (PERICOLACE) 8.6-50 mg per tablet 1 Tab, 1 Tab, Oral, DAILY, Brittany Roman MD, 1 Tab at 12/01/19 1469   polyethylene glycol (MIRALAX) packet 17 g, 17 g, Oral, DAILY, Brittany Roman MD, 17 g at 12/01/19 0816 
  bisacodyl (DULCOLAX) tablet 5 mg, 5 mg, Oral, DAILY PRN, Brittany Roman MD 
  sucralfate (CARAFATE) tablet 1 g, 1 g, Oral, AC&HS, Brittany Roman, MD, 1 g at 12/01/19 1158   influenza vaccine 2019-20 (6 mos+)(PF) (FLUARIX/FLULAVAL/FLUZONE QUAD) injection 0.5 mL, 0.5 mL, IntraMUSCular, PRIOR TO DISCHARGE, Mounika Altman MD 
  hydrALAZINE (APRESOLINE) 20 mg/mL injection 20 mg, 20 mg, IntraVENous, Q6H PRN, Shana Sims, NP, 20 mg at 11/19/19 0547 
  dextrose 10 % infusion 125-250 mL, 125-250 mL, IntraVENous, PRN, Mounika Altman MD, Stopped at 11/18/19 0700 
  milrinone (PRIMACOR) 20 MG/100 ML D5W infusion, 0.3 mcg/kg/min, IntraVENous, TITRATE, Ellen Polk MD, Last Rate: 10 mL/hr at 12/01/19 1233, 0.375 mcg/kg/min at 12/01/19 1233 A/P 
  
S/P LVAD - good flows Need for anti-coagulation - coumadin DM - insulin RV dysfunction - milrinone 
  
Risk of morbidity and mortality - high Medical decision making - high complexity Signed By: Tiffanie Minaya MD

## 2019-12-01 NOTE — PROGRESS NOTES
[address] [facility] in [place], [place] Inpatient Progress Note Patient name: Natasha Flanagan Patient : 1950 Patient MRN: [id number] Attending MD: Alicia Triana MD 
Date of service: 19 Chief Complaint:  
Sabiha Rolon azucena LVAD implant 
  
HPI: Sona Kiser is a 71y.o. year old pleasant white male with a history of HTN, HLD, JOSE, CAD s/p cardiac arrest VFib s/p CABG () c/b sternal would infection and sternectomy, ischemic cardiomyopathy LVEF 15-20%, s/p ICD and with LBBB. He was admitted with acute on chronic systolic heart failure with massive volume overload > 20 lbs, in the setting of atrial fibrillation s/p failed DCCV and underwent DCCV and RHC on .  S/p BiVICD on 19 with Dr. Claus Stovall. He was discharged home home on IV milrinone on 19. Margret Ramirez has been followed closely by Dr. Aura Sorto and the [facility]. 
  
Mr. Kiser was admitted for acute on chronic systolic heart failure. He underwent implantation of Impella 5.0 due to CS and has completed his LVAD evaluation. Margret Ramirez meets criteria for implant of HM3 as DT. He was NYHA Class IV prior to implant of Impella 5.0, has LVEF < 15%, was intolerant of GDMT due to symptomatic hypotension and renal dysfunction. He remains dependent on temporary MCS support. RHC  revealed compensated hemodynamics on Impella. His renal function has improved dramatically with improvement in his hemodynamics. He is not a suitable transplant candidate due to single kidney, sternectomy, debility, and frailty. He was reviewed by Eduin Liriano and felt to be a high risk heart transplant candidate due to multiple co-morbidities as well as the afore mentioned conditions.  He remained in the CCU and underwent a HM 3 implant as DT on .  
  
24Hr Events S/p left arm thrombectomy Right arm discomfort Cr stable Adequate diuresis Pain improving  
  
Procedure:  Procedure(s): 
 REMOVAL TEMPORARY LEFT VENTRICULAR ASSIST DEVICE, IMPLANTATION OF LEFT VENTRICULAR ASSIST DEVICE PERMANENT (VAD), ECC, JACQUE, EPI AORTIC US BY DR Aylin Motley.    
BJX:  04 
  
  
Impression / Plan:  
Heart Failure Status: NYHA Class IV INTERMACS Category 2 
  
Chronic systolic heart failure due to ICM, NYHA Class IV, EF < 15%, cardiogenic shock bridged with Impella 5.0 support to HM 3 implant S/p Impella removal and LVAD implant Decrease milrinone 0.3mcg/kg/min due to Cr rise Cont Sildenafil - will need PA 
 Decrease IV Bumex to 1 mg BID  + 25% albumin BID No AA/ARB/ARNI post op- will start as appropriate Strict I/O, daily weights, Na+ restricted diet Continue LVAD education Daily dressing changes  
  
Anticoagulation for LVAD, INR goal 2-3 Continue warfarin, ASA Con coumadin tonight 5 mg, INR 2.1 
  
Acute Respiratory Failure post op Resolved Pulmonary hygiene Wean NC for sats > 92% Cont home CPAP - wife bringing back up mask  
  
Post op Pain PRN oxycodone Comfort measures  
  
L Brachial Artery Thrombosis s/p thrombectomy Surgical management per Dr. Ria Flower On warfarin, INR 2.2 at goal 
Pain improved  
  
Swelling, pain of right arm, acute Doppler studies (-) for thrombus  
Continue [facility] placement Check subclavian artery ultrasound 
  
CKD 3, atrophic left kidney Appreciate Nephrology assistance Assess diuretic dosing daily IV bumex Avoid nephrotoxins Trend labs ProNTBNP rising despite good diuresis 
  
CAD s/p CABG Cont low dose ASA- watch platelets No BB d/t RV failure Hold statin due to recent hepatic failure [facility] performed 11/13 - low likelihood of benefit from revascularization  
  
Hx of VF arrest s/p BiV-ICD No recent shocks Keep K > 4 and Mg > 2  
   
PAF Currently in ST, Paced Cont PO Amio Digoxin to 25 mcg daily Check EKG today to assess rhythm and QTc 
  
Hypokalemia 
 Klor Con to 60 mEq TID PO  
  
Hx of gout Uric acid WNL   
 Suspected aspiration pneumonia RUL consolidation Suspect aspiration related to over sedation Limit sedatives Sputum culture Cefepime 2g q8h x 7 days total 
  
Urinary retention, c/b serratia UTI and hematuria Appreciate Urology consult Cystoscopy performed 11/13 shows catheter induced cystitis Repeat UA shows resolution of UTI  
  
Malnutrition Appreciate Nutritionist consult Prealbumin down to 7.6 Continue Marinol 5 mg PO BID 
6 small meals daily Continue mirtazapine - watch Na+ 
  
COPD Appreciate Pulmonology assistance Continue nebs PRN   
PFTs complete 
  
Anemia Multifactorial, due to hemolysis + epistaxis + hematuria Intolerant of octreotide or doxycycline for AVM ppx due to prolonged QTc Transfuse for Hgb goal > 8 
  
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  
  
Hx of sternal wound infection, sternectomy Sternum closed post op- monitor  
  
Vocal cord paralysis Improved Speech therapy following May need MBS 
  
Debility Continue PT/OT  
  
Ppx Protonix PT/OT Will continue cefepime for 2 weeks post op per Dr. Yari Chacon for Impella prophylaxis Bowel regimen Cont Mechanical soft PICC placed 
  
Dispo: 
Likely will need IP rehab LVAD INTERROGATION: 
Device interrogated in person Device function normal, normal flow, no events LVAD Pump Speed (RPM): 9564 Pump Flow (LPM): 4.5 MAP: 82 
PI (Pulsitility Index): 3.4 Power: 3.6  Test: Yes 
Back Up  at Bedside & Labeled: Yes Power Module Test: Yes Driveline Site Care: No 
Driveline Dressing: Clean, Dry, and Intact Outpatient: No 
MAP in Therapeutic Range (Outpatient): Yes Testing Alarms Reviewed: Yes 
Back up SC speed: 5200 Back up Low Speed Limit: 4800 Emergency Equipment with Patient?: Yes Emergency procedures reviewed?: Yes Drive line site inspected?: Yes Drive line intergrity inspected?: Yes Drive line dressing changed?: No 
 
PHYSICAL EXAM: 
Visit Vitals BP (!) 74/56 Pulse 88 Temp 98.5 °F (36.9 °C) Resp 18 Ht 6' 2\" (1.88 m) Wt 185 lb 6.5 oz (84.1 kg) SpO2 94% BMI 23.80 kg/m² General: Patient is well developed, well-nourished in no acute distress HEENT: Normocephalic and atraumatic. No scleral icterus. Pupils are equal, round and reactive to light and accomodation. No conjunctival injection. Oropharynx is clear. Neck: Supple. No evidence of thyroid enlargements or lymphadenopathy. JVD: Cannot be appreciated Lungs: Breath sounds are equal and clear bilaterally. No wheezes, rhonchi, or rales. Heart: Regular rate and rhythm with normal S1 and S2. No murmurs, gallops or rubs. Abdomen: Soft, no mass or tenderness. No organomegaly or hernia. Bowel sounds present. Genitourinary and rectal: deferred Extremities: No cyanosis, clubbing, or edema. Neurologic: No focal sensory or motor deficits are noted. Grossly intact. Psychiatric: Awake, alert an doriented x 3. Appropriate mood and affect. Skin: Warm, dry and well perfused. No lesions, nodules or rashes are noted. REVIEW OF SYSTEMS: 
General: Denies fever, night sweats. Ear, nose and throat: Denies difficulty hearing, sinus problems, runny nose, post-nasal drip, ringing in ears, mouth sores, loose teeth, ear pain, nosebleeds, sore throate, facial pain or numbess Cardiovascular: see above in the interval history Respiratory: Denies cough, wheezing, sputum production, hemoptysis. Gastrointestinal: Denies heartburn, constipation, intolerance to certain foods, diarrhea, abdominal pain, nausea, vomiting, difficulty swallowing, blood in stool Kidney and bladder: Denies painful urination, frequent urination, urgency, prostate problems and impotence Musculoskeletal: Denies joint pain, muscle weakness Skin and hair: Denies change in existing skin lesions, hair loss or increase, breast changes PAST MEDICAL HISTORY: 
Past Medical History:  
Diagnosis Date  Degenerative disc disease, lumbar  Heart failure ([facility]  High cholesterol  Hypertension  Paroxysmal atrial fibrillation ([facility] 2019  Spinal stenosis PAST SURGICAL HISTORY: 
Past Surgical History:  
Procedure Laterality Date  COLONOSCOPY Left 2019 COLONOSCOPY at bedside performed by Nita Macdonald MD at [address]  HX CORONARY ARTERY BYPASS GRAFT    
 triple  HX HERNIA REPAIR    
 HX IMPLANTABLE CARDIOVERTER DEFIBRILLATOR  AL CARDIOVERSION ELECTIVE ARRHYTHMIA EXTERNAL N/A 6/10/2019 EP CARDIOVERSION performed by Mario Salvador MD at [address] CATH LAB  AL CARDIOVERSION ELECTIVE ARRHYTHMIA EXTERNAL N/A 2019 EP CARDIOVERSION performed by Aleshia Martinez MD at [address] CATH LAB  AL INSJ/RPLCMT PERM DFB W/TRNSVNS LDS 1/DUAL CHMBR N/A 2019 INSERT ICD BIV MULTI performed by Veronica Christopher MD at [address] CATH LAB  AL TCAT IMPL WRLS P-ART PRS SNR L-T HEMODYN MNTR N/A 2019 IMPLANT HEART FAILURE MONITORING DEVICE performed by Abhijeet Marlow MD at [address] CATH LAB FAMILY HISTORY: 
Family History Problem Relation Age of Onset  Lung Disease Mother  Hypertension Mother [employer] Arthritis-osteo Mother  Heart Disease Mother  Heart Disease Father  Diabetes Father SOCIAL HISTORY: 
Social History Socioeconomic History  Marital status:  Spouse name: Not on file  Number of children: Not on file  Years of education: Not on file  Highest education level: Not on file Tobacco Use  Smoking status: Former Smoker Last attempt to quit: 2010 Years since quittin.0  Smokeless tobacco: Never Used Substance and Sexual Activity  Alcohol use: Not Currently Comment: rarely  Drug use: Never Other Topics Concern LABORATORY RESULTS: 
  
Labs Latest Ref Rng & Units 2019 11/27/2019 11/26/2019 11/25/2019 WBC 4.1 - 11.1 K/uL 7.4 7.3 7.9 7.1 7.0 7.5 -  
RBC 4.10 - 5.70 M/uL 2.43(L) 2.82(L) 2.69(L) 2.83(L) 2.91(L) 3.13(L) - Hemoglobin 12.1 - 17.0 g/dL 7. 3(L) 8.4(L) 8. 1(L) 8.5(L) 8. 9(L) 9.4(L) - Hematocrit 36.6 - 50.3 % 24. 0(L) 27. 9(L) 26. 7(L) 27. 9(L) 28. 6(L) 30. 9(L) -  
MCV 80.0 - 99.0 FL 98.8 98.9 99. 3(H) 98.6 98.3 98.7 - Platelets 178 - 743 K/uL 265 244 219 216 222 194 - Lymphocytes 12 - 49 % - - - - - - - Monocytes 5 - 13 % - - - - - - - Eosinophils 0 - 7 % - - - - - - - Basophils 0 - 1 % - - - - - - - Albumin 3.5 - 5.0 g/dL 2. 6(L) 2. 4(L) 2. 2(L) 2. 2(L) 2. 2(L) 2. 1(L) -  
Calcium 8.5 - 10.1 MG/DL 8.8 8.7 8.6 8.4(L) 8. 3(L) 8.6 -  
SGOT 15 - 37 U/L 14(L) 15 12(L) 13(L) 10(L) 11(L) -  
Glucose 65 - 100 mg/dL 92 118(H) 141(H) 135(H) 95 161(H) -  
BUN 6 - 20 MG/DL 20 19 17 16 20 20 -  
Creatinine 0.70 - 1.30 MG/DL 1.65(H) 1.55(H) 1.33(H) 1.12 1.08 1.10 - Sodium 136 - 145 mmol/L 133(L) 134(L) 131(L) 132(L) 134(L) 134(L) - Potassium 3.5 - 5.1 mmol/L 4.5 4.1 4.2 4.1 4.1 4.4 -  
TSH 0.36 - 3.74 uIU/mL - - - - - - -  
PSA 0.01 - 4.0 ng/mL - - - - - - -  
LDH 85 - 241 U/L 271(H) 256(H) 246(H) 218 230 235 258(H) CEA ng/mL - - - - - - - Some recent data might be hidden Lab Results Component Value Date/Time TSH 2.40 10/25/2019 07:39 PM  
 TSH 2.45 06/01/2019 04:16 AM  
 
 
ALLERGY: 
No Known Allergies CURRENT MEDICATIONS: 
 
Current Facility-Administered Medications:  
  bumetanide (BUMEX) injection 1 mg, 1 mg, IntraVENous, BID, Antonette Harper MD, 1 mg at 12/01/19 0972   cefepime (MAXIPIME) 2 g in 0.9% sodium chloride (MBP/ADV) 100 mL, 2 g, IntraVENous, Q12H, Jessica Herrera NP 
  lactobac ac& pc-s.therm-b.anim (TIAN Q/RISAQUAD), 1 Cap, Oral, DAILY, Andre Lam MD, 1 Cap at 12/01/19 2229   dronabinol (MARINOL) capsule 5 mg, 5 mg, Oral, ACB&D, Vale Herrera NP, 5 mg at 12/01/19 2791   albumin human 25% (BUMINATE) solution 12.5 g, 12.5 g, IntraVENous, BID, Polliard, France Bonnie T, NP, 12.5 g at 12/01/19 8928   digoxin (LANOXIN) tablet 0.25 mg, 0.25 mg, Oral, DAILY, Polliard, France Bonnie T, NP, 0.25 mg at 12/01/19 1818   potassium chloride SR (KLOR-CON 10) tablet 60 mEq, 60 mEq, Oral, TID, Polliard, France Bonnie T, NP, 60 mEq at 12/01/19 0816 
  magnesium oxide (MAG-OX) tablet 400 mg, 400 mg, Oral, BID, Canda Bogus, NP, 400 mg at 12/01/19 2343   oxyCODONE IR (ROXICODONE) tablet 5 mg, 5 mg, Oral, Q6H PRN, Era Martinez MD, 5 mg at 11/30/19 7910   mirtazapine (REMERON) tablet 15 mg, 15 mg, Oral, QHS, Canda Bogus, NP, 15 mg at 11/30/19 2138   balsam peru-castor oil (VENELEX) ointment, , Topical, TID, Nasrin Andrews MD 
  [facility]) capsule 0.4 mg, 0.4 mg, Oral, DAILY, Logan Kincaid MD, 0.4 mg at 12/01/19 6240   aspirin chewable tablet 81 mg, 81 mg, Oral, DAILY, Levi, Shana B, NP, 81 mg at 12/01/19 0816 
  sildenafil (antihypertensive) (REVATIO) tablet 20 mg, 20 mg, Oral, TID, Levi, Shana B, NP, 20 mg at 12/01/19 0816 
  pantoprazole (PROTONIX) tablet 40 mg, 40 mg, Oral, ACB, Levi, Shana B, NP, 40 mg at 12/01/19 0747 
  insulin lispro (HUMALOG) injection, , SubCUTAneous, AC&HS, Levi, Shana B, NP, Stopped at 11/27/19 1630   Warfarin MD/NP dosing, , Other, PRN, Mounika Altman MD 
  epoetin yvonne-epbx (RETACRIT) injection 20,000 Units, 20,000 Units, SubCUTAneous, Q7D, Roma Medellin MD, 20,000 Units at 11/26/19 2518   lidocaine (LIDODERM) 5 % patch 1 Patch, 1 Patch, TransDERmal, Q24H, Shana Sims NP, 1 Patch at 11/29/19 2039   sodium chloride (NS) flush 5-40 mL, 5-40 mL, IntraVENous, Q8H, Brad Noreen, Harshal PAULINO MD, 10 mL at 12/01/19 0747 
  sodium chloride (NS) flush 5-40 mL, 5-40 mL, IntraVENous, PRN, Era Martinez MD 
  acetaminophen (TYLENOL) tablet 650 mg, 650 mg, Oral, Q6H PRN, Brad Safe, Mallory Kern MD, 650 mg at 11/28/19 2112 
  naloxone [facility]) injection 0.4 mg, 0.4 mg, IntraVENous, PRN, Padmaja Reina MD 
  ondansetron [facility]) injection 4 mg, 4 mg, IntraVENous, Q4H PRN, Padmaja Reina MD, 4 mg at 11/20/19 2000 
  albuterol-ipratropium (DUO-NEB) 2.5 MG-0.5 MG/3 ML, 3 mL, Nebulization, Q6H PRN, Padmaja Reina MD, 3 mL at 11/30/19 0526 
  senna-docusate (PERICOLACE) 8.6-50 mg per tablet 1 Tab, 1 Tab, Oral, DAILY, Padmaja Reina MD, 1 Tab at 12/01/19 2436   polyethylene glycol (MIRALAX) packet 17 g, 17 g, Oral, DAILY, Padmaja Reina MD, 17 g at 12/01/19 0816 
  bisacodyl (DULCOLAX) tablet 5 mg, 5 mg, Oral, DAILY PRN, Padmaja Reina MD 
  sucralfate (CARAFATE) tablet 1 g, 1 g, Oral, AC&HS, Padmaja Reina MD, 1 g at 12/01/19 1158   influenza vaccine 2019-20 (6 mos+)(PF) (FLUARIX/FLULAVAL/FLUZONE QUAD) injection 0.5 mL, 0.5 mL, IntraMUSCular, PRIOR TO DISCHARGE, Mounika Altman MD 
  hydrALAZINE (APRESOLINE) 20 mg/mL injection 20 mg, 20 mg, IntraVENous, Q6H PRN, Shana Sims NP, 20 mg at 11/19/19 0547 
  dextrose 10 % infusion 125-250 mL, 125-250 mL, IntraVENous, PRN, Mounika Altman MD, Stopped at 11/18/19 0700 
  milrinone (PRIMACOR) 20 MG/100 ML D5W infusion, 0.375 mcg/kg/min, IntraVENous, TITRATE, Mounika Altman MD, Last Rate: 10 mL/hr at 12/01/19 1233, 0.375 mcg/kg/min at 12/01/19 1233 Thank you for allowing me to participate in this patient's care. Melissa Hilliard MD PhD 
[address] [place] [address], [address] Phone: [phone] Fax: [phone]

## 2019-12-01 NOTE — ROUTINE PROCESS
Bedside and Verbal shift change report given to Fatimah Ledesma RN (oncoming nurse) by Rosalee Andino RN (offgoing nurse). Report included the following information SBAR, Kardex, ED Summary, OR Summary, Procedure Summary, Intake/Output, MAR, Accordion and Recent Results.

## 2019-12-01 NOTE — PROGRESS NOTES
Day #6 of Cefepime - Renal Dosing Update Indication:  Aspiration Current regimen:  2 gm IV Q 8 hr Abx regimen: Monotherapy Recent Labs 19 
0321 19 
0424 19 
0417 WBC 7.4 7.3 7.9 CREA 1.65* 1.55* 1.33* BUN 20  17 Est CrCl: ~45-50 ml/min; UO: ~0.9 ml/kg/hr Temp (24hrs), Av.6 °F (37 °C), Min:97.8 °F (36.6 °C), Max:99.2 °F (37.3 °C) Cultures:  
 Sputum: heavy Enterococcus spp + light yeast, final 
 
Plan: Given patient's worsening Scr, the dose of cefepime has been adjusted to 2 gm IV Q 12 hr per [facility] P&T Committee Protocol with respect to renal function. Pharmacy will continue to monitor patient daily and will make dosage adjustments based upon changing renal function. Note: Cefepime was only ordered for 7 days and is scheduled to stop tomorrow morning.

## 2019-12-01 NOTE — PROGRESS NOTES
Problem: Mobility Impaired (Adult and Pediatric) Goal: *Acute Goals and Plan of Care (Insert Text) Description FUNCTIONAL STATUS PRIOR TO ADMISSION: Patient was modified independent using a single point cane for functional mobility. Patient reports an increasingly sedentary lifestyle 2* fatigue and SOB/dyspnea. Retired (so is his wife). Patient reports x 3 falls within the last couple of weeks. Patient is wearing either nasal cannula or CPAP at night. LVAD work-up has been initiated. HOME SUPPORT PRIOR TO ADMISSION: The patient lived with his wife, but did not require physical assistance. Physical Therapy Goals: 
 
Re-evaluation completed 11/20/2019 and new goals formulated following LVAD implantation. 1.  Patient will move from supine to sit and sit to supine, scoot up and down and roll side to side in bed with minimal assistance/contact guard assist within 7 days. 2.  Patient will perform sit to/from stand with minimal assistance/contact guard assist within 7 days. 3.  Patient will ambulate 150 feet with least restrictive assistive device and minimal assistance/contact guard assist within 7 days. 4.  Patient will ascend/descend 4 stairs with handrail(s) with minimal assistance/contact guard assist within 7 days. 5.  Patient will perform cardiac exercises per protocol with supervision within 7 days. 6.  Patient will verbally and functionally recall 3/3 sternal precautions within 7 days. 7.  Patient will perform power exchange for power module to/from battery with moderate assistance  within 7 days. Initiated 10/27/2019; updated 11/15/2019 1. Patient will move from supine to sit and sit to supine, scoot up and down and roll side to side in bed with independence within 7 days. 2.  Patient will perform sit to/from stand with supervision/set-up within 7 days. 3.  Patient will ambulate 200 feet with least restrictive assistive device and supervision/set-up within 7 days. 4.  Patient will ascend/descend 4 stairs with  handrail(s) with supervision/set-up within 7 days for functional strengthening and community reintegration. Jen Means 5.  Patient will verbally and functionally recall 3/3 sternal precautions within 7 days in preparation for LVAD implantation. 6.  Patient will perform a mock power exchange for power module to/from battery with supervision/set-up within 7 days in preparation for LVAD implantation. 1.  Patient will move from supine to sit and sit to supine, scoot up and down and roll side to side in bed with independence within 7 days. 2.  Patient will perform sit to/from stand with supervision/set-up within 7 days. 3.  Patient will ambulate 150 feet with least restrictive assistive device and supervision/set-up within 7 days. 4.  Patient will ascend/descend 4 stairs with  handrail(s) with supervision/set-up within 7 days for functional strengthening and community reintegration. Jen Means 5.  Patient will verbally and functionally recall 3/3 sternal precautions within 7 days in preparation for LVAD implantation. 6.  Patient will perform a mock power exchange for power module to/from battery with supervision/set-up within 7 days in preparation for LVAD implantation. Outcome: Progressing Towards Goal 
 
PHYSICAL THERAPY TREATMENT Patient: Tammie Vidal (75 y.o. male) Date: 12/1/2019 Diagnosis: [facility]ens[facility] [I50.9] <principal problem not specified> Procedure(s) (LRB): LEFT BRACHIAL THROMBECTOMY (Left) 6 Days Post-Op Precautions: Fall, Sternal 
Chart, physical therapy assessment, plan of care and goals were reviewed. ASSESSMENT Patient continues with skilled PT services and is progressing towards goals. Pt agreeable to therapy but reports fatigue. Pt had some confusion during session. Pt had decrease recall of LVAD part even post review.  Pt was able to ambulate a short distance with decrease LE stability and balance. Pt required significant assistance of 2 for gait. Pt needed rest break post gait. Current Level of Function Impacting Discharge (mobility/balance): mod/max A x2 Other factors to consider for discharge: cognition, decrease strength and activity tolerance. altered COG PLAN : 
Patient continues to benefit from skilled intervention to address the above impairments. Continue treatment per established plan of care. to address goals. Recommendation for discharge: (in order for the patient to meet his/her long term goals) Therapy 3 hours per day 5-7 days per week This discharge recommendation: 
Has not yet been discussed the attending provider and/or case management IF patient discharges home will need the following DME: to be determined (TBD) SUBJECTIVE:  
Patient stated I am tired .  OBJECTIVE DATA SUMMARY:  
Critical Behavior: 
Neurologic State: Alert, Confused Orientation Level: Oriented X4 Cognition: Appropriate for age attention/concentration, Follows commands, Memory loss, Recognition of people/places Safety/Judgement: Decreased insight into deficits, Decreased awareness of need for safety, Decreased awareness of need for assistance Functional Mobility Training: 
Bed Mobility: 
  
  
  
  
  
  
Transfers: 
Sit to Stand: Moderate assistance;Assist x2(3 trial) Stand to Sit: Minimum assistance;Assist x2 Balance: 
Standing: Intact Standing - Static: Poor Standing - Dynamic : Poor Ambulation/Gait Training: 
Distance (ft): 10 Feet (ft) Assistive Device: Gait belt Ambulation - Level of Assistance: Maximum assistance;Assist x2 Gait Abnormalities: Decreased step clearance; Path deviations Base of Support: Center of gravity altered Stairs: Therapeutic Exercises: Activity Tolerance:  
requires frequent rest breaks and observed SOB with activity Please refer to the flowsheet for vital signs taken during this treatment. After treatment patient left in no apparent distress:  
Sitting in chair and Call bell within reach COMMUNICATION/COLLABORATION:  
The patients plan of care was discussed with: Registered Nurse Perry Castelan PTA Time Calculation: 26 mins

## 2019-12-02 NOTE — PROGRESS NOTES
[facility] 
 [address] [facility], [facility] [address] [facility] Phone: [phone]   [phone]   
  
Nephrology Progress Note Sona Kiser     1950     [id number] Date of Admission : 10/25/2019 
12/02/19 CC:  Follow up for JUAN J, CKD, Hyponatremia Assessment and Plan JUAN J on CKD - Cr rising and remains in CHF  
- agree w/ increasing Bumex to 1mg TID 
- daily labs Hyponatremia: 
- 2/2 CHF 
- cont diuresis and FR 
  
Gross hematuria, BPH w/ retention: 
- off CBI pre VAD. cysto pre op showed catheter related Cystitis @ postr wall  
- neal had to be replaced due to urinary retention - On flomax - ? Voiding trial this week Acute on Chronic HFrEF  
- EF 16-20%, NYHA Class IV , hx of VF arrest - s/p AICD 
- Impella insertion 10/29- removed 11/18  
- s/p LVAD placement on 11/18 CKD Stage III  
- Mild Left renal atrophy at baseline Hoarseness, vocal cord paralysis, mediastinal adenopathy: 
- will need eventual PET/CT 
  
L arm clot s/p thrombectomy on 11/25 JOSE on CPAP  
  
PAF s/p DCCV 6/19 
  
Anemia: 
- from blood loss s/p multiple blood products - s/p IV iron,  Repeat iron studies ok 
- on PAVEL q7 d Serratia and Enteroccocus UTI: 
- completed abx Interval History:   
Seen and examined. Remains quite SOB, weak and reports poor apetite Review of Systems: Pertinent items are noted in HPI. Current Medications:  
Current Facility-Administered Medications Medication Dose Route Frequency  milrinone (PRIMACOR) 20 MG/100 ML D5W infusion  0.25 mcg/kg/min IntraVENous TITRATE  bumetanide (BUMEX) injection 1 mg  1 mg IntraVENous TID  lactobac ac& pc-s.therm-b.anim (TIAN Q/RISAQUAD)  1 Cap Oral DAILY  dronabinol (MARINOL) capsule 5 mg  5 mg Oral ACB&D  
 albumin human 25% (BUMINATE) solution 12.5 g  12.5 g IntraVENous BID  [Held by provider] digoxin (LANOXIN) tablet 0.25 mg  0.25 mg Oral DAILY  potassium chloride SR (KLOR-CON 10) tablet 60 mEq  60 mEq Oral TID  magnesium oxide (MAG-OX) tablet 400 mg  400 mg Oral BID  
 oxyCODONE IR (ROXICODONE) tablet 5 mg  5 mg Oral Q6H PRN  mirtazapine (REMERON) tablet 15 mg  15 mg Oral QHS  balsam peru-castor oil (VENELEX) ointment   Topical TID  tamsulosin (FLOMAX) capsule 0.4 mg  0.4 mg Oral DAILY  aspirin chewable tablet 81 mg  81 mg Oral DAILY  sildenafil (antihypertensive) (REVATIO) tablet 20 mg  20 mg Oral TID  pantoprazole (PROTONIX) tablet 40 mg  40 mg Oral ACB  insulin lispro (HUMALOG) injection   SubCUTAneous AC&HS  Warfarin MD/NP dosing   Other PRN  
 epoetin yvonne-epbx (RETACRIT) injection 20,000 Units  20,000 Units SubCUTAneous Q7D  
 lidocaine (LIDODERM) 5 % patch 1 Patch  1 Patch TransDERmal Q24H  
 sodium chloride (NS) flush 5-40 mL  5-40 mL IntraVENous Q8H  
 sodium chloride (NS) flush 5-40 mL  5-40 mL IntraVENous PRN  
 acetaminophen (TYLENOL) tablet 650 mg  650 mg Oral Q6H PRN  
 naloxone (NARCAN) injection 0.4 mg  0.4 mg IntraVENous PRN  
 ondansetron (ZOFRAN) injection 4 mg  4 mg IntraVENous Q4H PRN  
 albuterol-ipratropium (DUO-NEB) 2.5 MG-0.5 MG/3 ML  3 mL Nebulization Q6H PRN  
 senna-docusate (PERICOLACE) 8.6-50 mg per tablet 1 Tab  1 Tab Oral DAILY  polyethylene glycol (MIRALAX) packet 17 g  17 g Oral DAILY  bisacodyl (DULCOLAX) tablet 5 mg  5 mg Oral DAILY PRN  
 sucralfate (CARAFATE) tablet 1 g  1 g Oral AC&HS  influenza vaccine 2019-20 (6 mos+)(PF) (FLUARIX/FLULAVAL/FLUZONE QUAD) injection 0.5 mL  0.5 mL IntraMUSCular PRIOR TO DISCHARGE  hydrALAZINE (APRESOLINE) 20 mg/mL injection 20 mg  20 mg IntraVENous Q6H PRN  
 dextrose 10 % infusion 125-250 mL  125-250 mL IntraVENous PRN No Known Allergies Objective: 
Vitals:   
Vitals:  
 12/02/19 0300 12/02/19 0639 12/02/19 0816 12/02/19 1115 BP:      
Pulse: 94  84 80 Resp: 18  20 18  
 Temp: 98 °F (36.7 °C)  97.3 °F (36.3 °C) 98 °F (36.7 °C) SpO2: 99%  100% 98% Weight:  79 kg (174 lb 2.6 oz) Height:      
 
Intake and Output: 
12/02 0701 - 12/02 1900 In: 200 [P.O.:200] Out: 50 [Urine:50] 
11/30 1901 - 12/02 0700 In: 1484.7 [P.O.:680; I.V.:804.7] Out: 3000 [CRHLI:4455] Physical Examination: 
 
General: Awake and alert Neck:  Lines + Resp:  Decreased bibasilar breath sounds CV:  VADsounds  2-3+ b/l LE edema GI:  Soft, NT, + Bowel sounds Neurologic:  AAO X3  
:  + neal [x]    High complexity decision making was performed 
[x]    Patient is at high-risk of decompensation with multiple organ involvement Lab Data Personally Reviewed: I have reviewed all the pertinent labs, microbiology data and radiology studies during assessment. Recent Labs 12/02/19 0255 12/01/19 0321 11/30/19 0424 * 133* 134* K 5.1 4.5 4.1  98 96* CO2 30 32 33* GLU 91 92 118* BUN 21* 20 19 CREA 1.70* 1.65* 1.55* CA 9.0 8.8 8.7 MG 2.4 2.4 2.2 ALB 2.6* 2.6* 2.4* SGOT 15 14* 15 ALT 7* 6* 7* INR 2.7* 2.3* 2.2* Recent Labs 12/02/19 0255 12/01/19 0321 11/30/19 
0424 WBC 5.8 7.4 7.3 HGB 8.0* 7.3* 8.4* HCT 26.2* 24.0* 27.9*  
 265 244 No results found for: SDES Lab Results Component Value Date/Time Culture result: HEAVY ENTEROCOCCUS SPECIES NOTED (A) 11/27/2019 11:10 AM  
 Culture result: LIGHT YEAST (A) 11/27/2019 11:10 AM  
 Culture result: NO GROWTH 5 DAYS 11/12/2019 11:18 AM  
 Culture result: SERRATIA MARCESCENS (A) 10/31/2019 10:05 AM  
 Culture result: SERRATIA MARCESCENS (2ND COLONY TYPE/STRAIN) (A) 10/31/2019 10:05 AM  
 Culture result: ENTEROCOCCUS FAECALIS GROUP D (A) 10/31/2019 10:05 AM  
 
Recent Results (from the past 24 hour(s)) GLUCOSE, POC Collection Time: 12/01/19 11:55 AM  
Result Value Ref Range Glucose (POC) 123 (H) 65 - 100 mg/dL Performed by Amy Montemayor  PCT GLUCOSE, POC  
 Collection Time: 12/01/19  4:35 PM  
Result Value Ref Range Glucose (POC) 149 (H) 65 - 100 mg/dL Performed by Марина Curtis GLUCOSE, POC Collection Time: 12/01/19  9:47 PM  
Result Value Ref Range Glucose (POC) 103 (H) 65 - 100 mg/dL Performed by Corin Vides METABOLIC PANEL, COMPREHENSIVE Collection Time: 12/02/19  2:55 AM  
Result Value Ref Range Sodium 135 (L) 136 - 145 mmol/L Potassium 5.1 3.5 - 5.1 mmol/L Chloride 103 97 - 108 mmol/L  
 CO2 30 21 - 32 mmol/L Anion gap 2 (L) 5 - 15 mmol/L Glucose 91 65 - 100 mg/dL BUN 21 (H) 6 - 20 MG/DL Creatinine 1.70 (H) 0.70 - 1.30 MG/DL  
 BUN/Creatinine ratio 12 12 - 20 GFR est AA 49 (L) >60 ml/min/1.73m2 GFR est non-AA 40 (L) >60 ml/min/1.73m2 Calcium 9.0 8.5 - 10.1 MG/DL Bilirubin, total 1.0 0.2 - 1.0 MG/DL  
 ALT (SGPT) 7 (L) 12 - 78 U/L  
 AST (SGOT) 15 15 - 37 U/L Alk. phosphatase 97 45 - 117 U/L Protein, total 5.9 (L) 6.4 - 8.2 g/dL Albumin 2.6 (L) 3.5 - 5.0 g/dL Globulin 3.3 2.0 - 4.0 g/dL A-G Ratio 0.8 (L) 1.1 - 2.2 MAGNESIUM Collection Time: 12/02/19  2:55 AM  
Result Value Ref Range Magnesium 2.4 1.6 - 2.4 mg/dL CBC W/O DIFF Collection Time: 12/02/19  2:55 AM  
Result Value Ref Range WBC 5.8 4.1 - 11.1 K/uL  
 RBC 2.63 (L) 4.10 - 5.70 M/uL HGB 8.0 (L) 12.1 - 17.0 g/dL HCT 26.2 (L) 36.6 - 50.3 % MCV 99.6 (H) 80.0 - 99.0 FL  
 MCH 30.4 26.0 - 34.0 PG  
 MCHC 30.5 30.0 - 36.5 g/dL  
 RDW 18.3 (H) 11.5 - 14.5 % PLATELET 577 940 - 361 K/uL MPV 9.2 8.9 - 12.9 FL  
 NRBC 0.0 0  WBC ABSOLUTE NRBC 0.00 0.00 - 0.01 K/uL PROTHROMBIN TIME + INR Collection Time: 12/02/19  2:55 AM  
Result Value Ref Range INR 2.7 (H) 0.9 - 1.1 Prothrombin time 25.8 (H) 9.0 - 11.1 sec PTT Collection Time: 12/02/19  2:55 AM  
Result Value Ref Range  aPTT 39.3 (H) 22.1 - 32.0 sec  
 aPTT, therapeutic range     58.0 - 77.0 SECS  
DIGOXIN  
 Collection Time: 12/02/19  2:55 AM  
Result Value Ref Range Digoxin level 2.4 (H) 0.90 - 2.00 NG/ML  
LD Collection Time: 12/02/19  2:55 AM  
Result Value Ref Range  (H) 85 - 241 U/L  
NT-PRO BNP Collection Time: 12/02/19  2:55 AM  
Result Value Ref Range NT pro-BNP 11,720 (H) <125 PG/ML  
GLUCOSE, POC Collection Time: 12/02/19  6:34 AM  
Result Value Ref Range Glucose (POC) 103 (H) 65 - 100 mg/dL Performed by Santiago Rodríguez GLUCOSE, POC Collection Time: 12/02/19 11:15 AM  
Result Value Ref Range Glucose (POC) 163 (H) 65 - 100 mg/dL Performed by Tremaine Morrell MD

## 2019-12-02 NOTE — PROGRESS NOTES
Problem: Dysphagia (Adult) Goal: *Acute Goals and Plan of Care (Insert Text) Description Speech Pathology Re-evaluation 11/26/2019 1. Patient will tolerate regular/thin liquid diet with no overt s/s aspiration within 7 days Re-evaluation 11/20/2019 1. Patient will tolerate mechanical soft/thin liquid diet with no overt s/s aspiration within 7 days. MET Initiated 10/28/19 1. Patient will tolerate regular diet/thin liquids without overt s/s of aspiration within 7 days MET 2. Patient will participate in [facility] for further objective assessment of swallow physiology within 7 days (once medically stable from Impella/cardiac sx standpoint) NOT MET; discontinue Outcome: Progressing Towards Goal 
 SPEECH LANGUAGE PATHOLOGY DYSPHAGIA TREATMENT Patient: Sarah Bass (75 y.o. male) Date: 12/2/2019 Diagnosis: Acute decompensated heart failure ([facility] [address] [I50.9] <principal problem not specified> Procedure(s) (LRB): LEFT BRACHIAL THROMBECTOMY (Left) 7 Days Post-Op Precautions:   Fall, Sternal 
 
ASSESSMENT: 
Patient continues with suspected mild oropharyngeal dysphagia. Prolonged oral manipulation of the bolus noted. Pharyngeal swallow delayed and decreased via palpation. Throat clear/weak cough noted at the end of the meal. His wife describes growling during meals. She also reports significant coughing when drinking via straw over the weekend. He has been drinking from a cup ever since. He is at increased risk for aspiration and now with shortness of breath. RN and MD aware per family. He would benefit from [facility], once medically stable to leave the floor. PLAN: 
Recommendations and Planned Interventions: 
Continue regular diet, thin liquids No straws Upright for all PO Patient continues to benefit from skilled intervention to address the above impairments. Continue treatment per established plan of care. Discharge Recommendations: To Be Determined SUBJECTIVE:  
 Patient stated I'm doing ok. OBJECTIVE:  
Cognitive and Communication Status: 
Neurologic State: Alert Orientation Level: Oriented X4 Cognition: Follows commands Perception: Appears intact Perseveration: No perseveration noted Safety/Judgement: Decreased insight into deficits, Decreased awareness of need for safety, Decreased awareness of need for assistance Dysphagia Treatment: 
Oral Assessment: 
Oral Assessment Labial: No impairment Dentition: Upper dentures; Lower dentures Oral Hygiene: moist, clean Lingual: No impairment Mandible: No impairment P.O. Trials: 
Patient Position: up in chair Vocal quality prior to P.O.: No impairment Consistency Presented: Thin liquid;Mixed consistency; Solid How Presented: Self-fed/presented;Cup/sip;Spoon Bolus Acceptance: No impairment Bolus Formation/Control: No impairment Type of Impairment: (prolonged mastication) Propulsion: No impairment Oral Residue: None Initiation of Swallow: Delayed (# of seconds) Laryngeal Elevation: Decreased Aspiration Signs/Symptoms: (weak cough at end of meal) Oral Phase Severity: Mild Pharyngeal Phase Severity : Mild Pain: 
Pain Scale 1: Numeric (0 - 10) Pain Intensity 1: 0 After treatment:  
[]              Patient left in no apparent distress sitting up in chair 
[x]              Patient left in no apparent distress in bed 
[x]              Call bell left within reach 
[]              Nursing notified 
[x]              Caregiver present 
[]              Bed alarm activated COMMUNICATION/EDUCATION:  
Patient was educated regarding role of SLP and recommendations. The patients plan of care including recommendations, planned interventions, and recommended diet changes were discussed with: Registered Nurse. []              Posted safety precautions in patient's room. Karol Multani, SLP Time Calculation: 20 mins

## 2019-12-02 NOTE — PROGRESS NOTES
ID Progress Note 
2019 Subjective:  
 
Quite weak today--fluid issues Objective: Antibiotics: 1. Levaquin 2. Rifampin 3. Fluconazole 4. Vancomycin 5. Cefazolin Vitals:  
Visit Vitals BP (!) 74/56 Pulse 70 Temp 97.3 °F (36.3 °C) Resp 18 Ht 6' 2\" (1.88 m) Wt 79 kg (174 lb 2.6 oz) SpO2 98% BMI 22.36 kg/m² Tmax:  Temp (24hrs), Av °F (36.7 °C), Min:97.3 °F (36.3 °C), Max:99.2 °F (37.3 °C) Exam:  Lungs:  clear to auscultation bilaterally Heart:  regular rate and rhythm Abdomen:  soft, non-tender. Bowel sounds normal. No masses,  no organomegaly Urine is clearing up Labs:     
Recent Labs 19 
0255 19 
0321 19 
0424 WBC 5.8 7.4 7.3 HGB 8.0* 7.3* 8.4*  265 244 BUN 21* 20 19 CREA 1.70* 1.65* 1.55* SGOT 15 14* 15  
AP 97 96 96 TBILI 1.0 1.1* 1.0 Cultures: No results found for: SDES Lab Results Component Value Date/Time Culture result: HEAVY ENTEROCOCCUS SPECIES NOTED (A) 2019 11:10 AM  
 Culture result: LIGHT YEAST (A) 2019 11:10 AM  
 Culture result: NO GROWTH 5 DAYS 2019 11:18 AM  
 
 
Radiology:  
 
Line/Insert Date:        
 
Assessment:  
 
1. UTI--Serratia (2 biotypes) from culture and small amount of Enterococcus 2. CHF/cardiomyopathy Objective: 1. Continue current therapy Claudia Manuel MD

## 2019-12-02 NOTE — PROGRESS NOTES
Physical Therapy 12/2/2019 Chart reviewed. Patient is currently undergoing drive-line dressing change at bedside with RN and wife. Will follow-up later as able/appropriate for patient's weekly re-evaluation. Thank you.  
Darin Tran, PT, DPT

## 2019-12-02 NOTE — PROGRESS NOTES
0730: Bedside shift change report given to Latasha WILKES (oncoming nurse) by Cass Frank RN (offgoing nurse). Report included the following information SBAR, Kardex, Procedure Summary, Intake/Output, MAR, and Recent Results. 1015: Pt falling asleep in bed, placed on CPAP. 1150: Pt appearing somulent and confused. Pt on CPAP sating 100%. VSS. Paged LVAD team.  
 
[id number]: Dr. Brigid Huertas at bedside. Verbal orders to change milrinone rate to 0.125 mcg/kg. 1430: Driveline dressing change performed by pt wife. Moderate prompting on steps and maintaining sterile field. 1615: IR at bedside for thoracentesis. Pt tolerated well. 1.2 L pulled off.  
 
1930: Bedside shift change report given to [address] [place] [address] (oncoming nurse) by Juliet Vaughn RN (offgoing nurse). Report included the following information SBAR, Kardex, Procedure Summary, Intake/Output, MAR and Recent Results. Problem: Falls - Risk of 
Goal: *Absence of Falls Description Document Arti Lemus Fall Risk and appropriate interventions in the flowsheet. Outcome: Progressing Towards Goal 
Note: Fall Risk Interventions: 
Mobility Interventions: Patient to call before getting OOB, PT Consult for mobility concerns, PT Consult for assist device competence Mentation Interventions: Door open when patient unattended, Increase mobility, More frequent rounding, Reorient patient, Room close to nurse's station, Self-releasing belt, Toileting rounds, Update white board Medication Interventions: Assess postural VS orthostatic hypotension, Teach patient to arise slowly, Patient to call before getting OOB Elimination Interventions: Call light in reach, Patient to call for help with toileting needs History of Falls Interventions: Door open when patient unattended, Evaluate medications/consider consulting pharmacy Problem: Patient Education: Go to Patient Education Activity Goal: Patient/Family Education Outcome: Progressing Towards Goal 
  
Problem: Pain Goal: *Control of Pain Outcome: Progressing Towards Goal 
  
Problem: Pressure Injury - Risk of 
Goal: *Prevention of pressure injury Description Document Mich Scale and appropriate interventions in the flowsheet. Outcome: Progressing Towards Goal 
Note: Pressure Injury Interventions: 
Sensory Interventions: Assess changes in LOC, Assess need for specialty bed, Avoid rigorous massage over bony prominences, Chair cushion, Check visual cues for pain, Maintain/enhance activity level, Keep linens dry and wrinkle-free, Minimize linen layers, Monitor skin under medical devices, Pad between skin to skin, Pressure redistribution bed/mattress (bed type), Sit a 90-degree angle/use footstool if needed, Suspension boots, Turn and reposition approx. every two hours (pillows and wedges if needed), Use 30-degree side-lying position Moisture Interventions: Absorbent underpads, Apply protective barrier, creams and emollients, Limit adult briefs, Maintain skin hydration (lotion/cream), Minimize layers, Moisture barrier, Offer toileting Q_hr Activity Interventions: Increase time out of bed, Pressure redistribution bed/mattress(bed type), PT/OT evaluation Mobility Interventions: HOB 30 degrees or less, PT/OT evaluation, Pressure redistribution bed/mattress (bed type) Nutrition Interventions: Discuss nutritional consult with provider, Offer support with meals,snacks and hydration Friction and Shear Interventions: Apply protective barrier, creams and emollients, HOB 30 degrees or less, Lift sheet, Lift team/patient mobility team, Minimize layers, Transferring/repositioning devices Problem: Patient Education: Go to Patient Education Activity Goal: Patient/Family Education Outcome: Progressing Towards Goal 
  
Problem: LVAD Post-op Day 8 to day 14 Goal: Diagnostic Test/Procedures Outcome: Progressing Towards Goal 
Goal: Nutrition/Diet Outcome: Progressing Towards Goal

## 2019-12-02 NOTE — PROGRESS NOTES
1930: Bedside and Verbal shift change report given to Maryam Yepez RN (oncoming nurse) by Ivy Araujo RN (offgoing nurse). Report included the following information SBAR, Kardex, Intake/Output, MAR, Recent Results and Cardiac Rhythm Paced/Afib.  
0045: Patient continues to removed CPAP while sleeping. 4.5L Nasal Canula placed. 0730: Bedside and Verbal shift change report given to Pelon Copeland RN (oncoming nurse) by Maryam Yepez RN (offgoing nurse). Report included the following information SBAR, Kardex, Intake/Output, MAR, Recent Results and Cardiac Rhythm Paced/Afib.

## 2019-12-02 NOTE — PROGRESS NOTES
[facility] VAD/Heart Failure Progress Note Admit Date: 10/25/2019 POD:  7 Days Post-Op Procedure:  Procedure(s): LEFT BRACHIAL THROMBECTOMY Subjective:  
Mild dyspnea, fatigue, and weakness; sleepy at times Objective:  
Vitals: 
Blood pressure (!) 74/56, pulse 80, temperature 98 °F (36.7 °C), resp. rate 18, height 6' 2\" (1.88 m), weight 174 lb 2.6 oz (79 kg), SpO2 98 %. Temp (24hrs), Av.2 °F (36.8 °C), Min:97.3 °F (36.3 °C), Max:99.2 °F (37.3 °C) Hemodynamics: 
 CO: CO (l/min): 5.4 l/min CI: CI (l/min/m2): 2.5 l/min/m2 CVP: CVP (mmHg): 7 mmHg (19 0930) SVR: SVR (dyne*sec)/cm5: 1067 (dyne*sec)/cm5 (19 1200) PAP Systolic: PAP Systolic: 50 (30/68/16 2808) PAP Diastolic: PAP Diastolic: 30 (42/95/56 9374) PVR:   
 SV02: SVO2 (%): 45 % (19 1200) SCV02: SCVO2 (%): 75 % (10/29/19 1900) VAD Interrogation: LVAD (Heartmate) Pump Speed (RPM): 5200 Pump Flow (LPM): 4.3 PI (Pulsitility Index): 3.3 Power: 3.5 MAP: 88  Test: No 
Back Up  at Bedside & Labeled: Yes Power Module Test: No 
Driveline Site Care: No 
Driveline Dressing: Clean, Dry, and Intact EKG/Rhythm:   
 
Extubation Date / Time:  
 
CT Output:  
 
Ventilator: 
Ventilator Volumes Vt Set (ml): 550 ml (19 08) Vt Exhaled (Machine Breath) (ml): 639 ml (19 08) Vt Spont (ml): 437 ml (19 1035) Ve Observed (l/min): 7.25 l/min (19 1035) Oxygen Therapy: 
Oxygen Therapy O2 Sat (%): 98 % (19) Pulse via Oximetry: 98 beats per minute (19 05) O2 Device: CPAP mask (19) O2 Flow Rate (L/min): 3 l/min (19 0816) FIO2 (%): 50 % (19) CXR: 
 
Admission Weight: Last Weight Weight: 192 lb 10.9 oz (87.4 kg) Weight: 174 lb 2.6 oz (79 kg) Intake / Output / Drain: 
Current Shift: 701 -  1900 In: 200 [P.O.:200] Out: 500 [Urine:500] Last 24 hrs.:  
 
 Intake/Output Summary (Last 24 hours) at 12/2/2019 1326 Last data filed at 12/2/2019 1115 Gross per 24 hour Intake 1124.73 ml Output 2350 ml Net -1225.27 ml Recent Labs 12/02/19 
1140 CPK 23* Recent Labs 12/02/19 
0255 12/01/19 
0321 11/30/19 
0424 * 133* 134* K 5.1 4.5 4.1 CO2 30 32 33*  
BUN 21* 20 19 CREA 1.70* 1.65* 1.55* GLU 91 92 118* MG 2.4 2.4 2.2 WBC 5.8 7.4 7.3 HGB 8.0* 7.3* 8.4* HCT 26.2* 24.0* 27.9*  
 265 244 Recent Labs 12/02/19 
0255 12/01/19 
0321 11/30/19 
0424 INR 2.7* 2.3* 2.2* PTP 25.8* 22.0* 21.8* APTT 39.3* 36.9* 37.0* No lab exists for component: PBNP Current Facility-Administered Medications:  
  milrinone (PRIMACOR) 20 MG/100 ML D5W infusion, 0.125 mcg/kg/min, IntraVENous, TITRATE, Ector Hernandez MD, Last Rate: 3 mL/hr at 12/02/19 1247, 0.125 mcg/kg/min at 12/02/19 1247   bumetanide (BUMEX) injection 1 mg, 1 mg, IntraVENous, TID, Naye Kincaid MD 
  lactobac ac& pc-s.therm-b.anim (TIAN Q/RISAQUAD), 1 Cap, Oral, DAILY, Ector Hernandez MD, 1 Cap at 12/02/19 0410   dronabinol (MARINOL) capsule 5 mg, 5 mg, Oral, ACB&D, Polliard, Everet Jon T, NP, 5 mg at 12/02/19 0634 
  albumin human 25% (BUMINATE) solution 12.5 g, 12.5 g, IntraVENous, BID, Polliard, Everet Jon T, NP, 12.5 g at 12/02/19 0571   [Held by provider] digoxin (LANOXIN) tablet 0.25 mg, 0.25 mg, Oral, DAILY, Polliard, Shabbiret Jon T, NP, 0.25 mg at 12/02/19 9725   potassium chloride SR (KLOR-CON 10) tablet 60 mEq, 60 mEq, Oral, TID, Polliard, Shabbiret Jon T, NP, 60 mEq at 12/02/19 0843 
  magnesium oxide (MAG-OX) tablet 400 mg, 400 mg, Oral, BID, Luis E Ghosh NP, 400 mg at 12/02/19 3161   oxyCODONE IR (ROXICODONE) tablet 5 mg, 5 mg, Oral, Q6H PRN, Raul Norris MD, 5 mg at 12/01/19 2150   mirtazapine (REMERON) tablet 15 mg, 15 mg, Oral, QHS, Luis E Ghosh NP, 15 mg at 12/01/19 2150   balsam peru-castor oil (VENELEX) ointment, , Topical, TID, Marina Andrews MD 
  tamsulosin [facility]) capsule 0.4 mg, 0.4 mg, Oral, DAILY, Logan Kincaid MD, 0.4 mg at 12/02/19 5261   aspirin chewable tablet 81 mg, 81 mg, Oral, DAILY, Levi, Shana B, NP, 81 mg at 12/02/19 2778   sildenafil (antihypertensive) (REVATIO) tablet 20 mg, 20 mg, Oral, TID, Levi, Shana B, NP, 20 mg at 12/02/19 6009   pantoprazole (PROTONIX) tablet 40 mg, 40 mg, Oral, ACB, Levi, Shana B, NP, 40 mg at 12/02/19 2565   insulin lispro (HUMALOG) injection, , SubCUTAneous, AC&HS, Levi, Shana B, NP, 2 Units at 12/02/19 1125   Warfarin MD/NP dosing, , Other, PRN, Mounika Altman MD 
  epoetin yvonne-epbx (RETACRIT) injection 20,000 Units, 20,000 Units, SubCUTAneous, Q7D, Shahzad Horne MD, 20,000 Units at 11/26/19 5289   lidocaine (LIDODERM) 5 % patch 1 Patch, 1 Patch, TransDERmal, Q24H, Levi, Shana B, NP, 1 Patch at 12/01/19 2207   sodium chloride (NS) flush 5-40 mL, 5-40 mL, IntraVENous, Q8H, Huan Rodriguez MD, 10 mL at 12/02/19 2095   sodium chloride (NS) flush 5-40 mL, 5-40 mL, IntraVENous, PRN, Huan Rodriguez MD 
  acetaminophen (TYLENOL) tablet 650 mg, 650 mg, Oral, Q6H PRN, Huan Rodriguez MD, 650 mg at 12/02/19 0634 
  naloxone (NARCAN) injection 0.4 mg, 0.4 mg, IntraVENous, PRN, Huan Rodriguez MD 
  ondansetron [facility]) injection 4 mg, 4 mg, IntraVENous, Q4H PRN, Huan Rodriguez MD, 4 mg at 11/20/19 2000 
  albuterol-ipratropium (DUO-NEB) 2.5 MG-0.5 MG/3 ML, 3 mL, Nebulization, Q6H PRN, Huan Rodriguez MD, 3 mL at 11/30/19 0526 
  senna-docusate (PERICOLACE) 8.6-50 mg per tablet 1 Tab, 1 Tab, Oral, DAILY, Huan Rodriguez MD, Stopped at 12/02/19 0900   polyethylene glycol (MIRALAX) packet 17 g, 17 g, Oral, DAILY, Huan Rodriguez MD, Stopped at 12/02/19 0900 
  bisacodyl (DULCOLAX) tablet 5 mg, 5 mg, Oral, DAILY PRN, Huan Rodriguez MD 
   sucralfate (CARAFATE) tablet 1 g, 1 g, Oral, AC&HS, Mitchel Mccord MD, 1 g at 12/02/19 1117 
  influenza vaccine 2019-20 (6 mos+)(PF) (FLUARIX/FLULAVAL/FLUZONE QUAD) injection 0.5 mL, 0.5 mL, IntraMUSCular, PRIOR TO DISCHARGE, Mounika Altman MD 
  hydrALAZINE (APRESOLINE) 20 mg/mL injection 20 mg, 20 mg, IntraVENous, Q6H PRN, Shana Sims, NP, 20 mg at 11/19/19 0547 
  dextrose 10 % infusion 125-250 mL, 125-250 mL, IntraVENous, PRN, [place] MD, Stopped at 11/18/19 0700 A/P 
  
S/P LVAD - good flows Need for anti-coagulation - coumadin DM - insulin RV dysfunction - milrinone 
  
Risk of morbidity and mortality - high Medical decision making - high complexity Signed By: Mary Aj MD

## 2019-12-02 NOTE — PROGRESS NOTES
Occupational Therapy: defer Chart reviewed, consulted with RN. Patient unavailable during 2 separate attempts for OT this afternoon due to anticoagulant education and LVAD dressing change. Will defer at this time and will f/u as able and appropriate.  
 
Nicola Kramer, OTR/L

## 2019-12-02 NOTE — PROGRESS NOTES
[address] [facility] [address] [facility] in [place], [place] Inpatient Progress Note Patient name: Anya Bingham Patient : 1950 Patient MRN: [id number] Attending MD: Mitch Ham MD 
Date of service: 19 Chief Complaint:  
Lissy Duron azucena LVAD implant 
  
HPI: Sona Kiser is a 71y.o. year old pleasant white male with a history of HTN, HLD, JOSE, CAD s/p cardiac arrest VFib s/p CABG () c/b sternal would infection and sternectomy, ischemic cardiomyopathy LVEF 15-20%, s/p ICD and with LBBB. He was admitted with acute on chronic systolic heart failure with massive volume overload > 20 lbs, in the setting of atrial fibrillation s/p failed DCCV and underwent DCCV and RHC on .  S/p BiVICD on 19 with Dr. Gio Lantigua. He was discharged home home on IV milrinone on 19. [facility] has been followed closely by Dr. Ethel Sinclair and the [facility]. 
  
Mr. Kiser was admitted for acute on chronic systolic heart failure. He underwent implantation of Impella 5.0 due to CS and has completed his LVAD evaluation. [facility] meets criteria for implant of HM3 as DT. He was NYHA Class IV prior to implant of Impella 5.0, has LVEF < 15%, was intolerant of GDMT due to symptomatic hypotension and renal dysfunction. He remains dependent on temporary MCS support. RHC  revealed compensated hemodynamics on Impella. His renal function has improved dramatically with improvement in his hemodynamics. He is not a suitable transplant candidate due to single kidney, sternectomy, debility, and frailty. He was reviewed by Bobby Rodriguez and felt to be a high risk heart transplant candidate due to multiple co-morbidities as well as the afore mentioned conditions.  He remained in the CCU and underwent a HM 3 implant as DT on . He was weaned off of pressors and transferred to [place] [address] where he continues to undergo PT/OT and hemodynamic optimization.   
  
24Hr Events Confused this AM  
Worsening right pleural effusion Cr trending up  
  
Procedure:  Procedure(s): REMOVAL TEMPORARY LEFT VENTRICULAR ASSIST DEVICE, IMPLANTATION OF LEFT VENTRICULAR ASSIST DEVICE PERMANENT (VAD), ECC, JACQUE, EPI AORTIC US BY DR Yasmeen Hansen.    
FNF:  89 
  
  
Impression / Plan:  
Heart Failure Status: NYHA Class IV INTERMACS Category 2 
  
Chronic systolic heart failure due to ICM, NYHA Class IV, EF < 15%, cardiogenic shock bridged with Impella 5.0 support to HM 3 implant S/p Impella removal and LVAD implant Increase RPMs from 5200 to 5400 due to large LV size (LVIDd 7.57 cm) Decrease milrinone to 0.125mcg/kg/min due to dizziness, vasodilation Cont Sildenafil 20 mg PO TID - rx sent to local pharmacy to initiate PA Decrease IV Bumex to 1 mg BID  + 25% albumin BID No AA/ARB/ARNI post op- will start as appropriate Strict I/O, daily weights, Na+ restricted diet Continue LVAD education Daily dressing changes  
  
Anticoagulation for LVAD, INR goal 2-3 Continue ASA Cont coumadin tonight 2.5 mg 
  
Acute Respiratory Failure post op More dyspneic today Worsening right pleural effusion Right thoracentesis today Pulmonary hygiene Wean NC for sats > 92% Cont home CPAP- must use while sleeping  
  
Post op Pain PRN oxycodone Comfort measures  
  
L Brachial Artery Thrombosis s/p thrombectomy Surgical management per Dr. Salinas Safe On warfarin, INR at goal 
Pain improved  
  
Swelling, pain of right arm, acute Doppler studies (-) for thrombus  
Continue AC Confirmed PICC placement 
  
CKD 3, atrophic left kidney Appreciate Nephrology assistance Assess diuretic dosing daily IV bumex Avoid nephrotoxins Trend labs ProNTBNP remains elevated 
  
CAD s/p CABG Cont low dose ASA- watch platelets No BB d/t RV failure Hold statin due to recent hepatic failure [facility] performed 11/13 - low likelihood of benefit from revascularization  
  
Hx of VF arrest s/p BiV-ICD No recent shocks Keep K > 4 and Mg > 2  
   
PAF 
 Currently in ST, Paced Cont PO Amio Hold digoxin - level 2.4 today - do not resume until < 1 Repeat TFTs  
  
Hypokalemia Klor Con to 60 mEq TID PO  
  
Hx of gout Uric acid WNL 
  
Suspected aspiration pneumonia RUL consolidation Suspect aspiration related to over sedation Limit sedatives Sputum culture Cefepime completed 12/2 
  
Urinary retention, c/b serratia UTI and hematuria Appreciate Urology consult Cystoscopy performed 11/13 shows catheter induced cystitis Repeat UA shows resolution of UTI  
  
Malnutrition Appreciate Nutritionist consult Repeat prealbumin Continue Marinol 5 mg PO BID 
6 small meals daily Continue mirtazapine - watch Na+ 
  
COPD Appreciate Pulmonology assistance Continue nebs PRN   
PFTs complete 
  
Anemia Multifactorial, due to hemolysis + epistaxis + hematuria Intolerant of octreotide or doxycycline for AVM ppx due to prolonged QTc Transfuse for Hgb goal > 8 
  
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  
  
Hx of sternal wound infection, sternectomy Sternum closed post op- monitor  
  
Vocal cord paralysis Improved Speech therapy following May need MBS 
  
Debility Continue PT/OT  
  
Ppx Protonix PT/OT Post-op abx complete Bowel regimen Cont Mechanical soft PICC placed 
  
Dispo: 
Likely will need IP rehab LVAD INTERROGATION: 
Device interrogated in person Device function normal, normal flow, no events LVAD Pump Speed (RPM): 5200 Pump Flow (LPM): 4.3 MAP: 78 
PI (Pulsitility Index): 3.3 Power: 3.5  Test: No 
Back Up  at Bedside & Labeled: Yes Power Module Test: No 
Driveline Site Care: No 
Driveline Dressing: Clean, Dry, and Intact Outpatient: No 
MAP in Therapeutic Range (Outpatient): Yes Testing Alarms Reviewed: Yes 
Back up SC speed: 5200 Back up Low Speed Limit: 4800 Emergency Equipment with Patient?: Yes Emergency procedures reviewed?: Yes 
 Drive line site inspected?: No 
Drive line intergrity inspected?: Yes Drive line dressing changed?: No 
 
PHYSICAL EXAM: 
Visit Vitals BP (!) 74/56 Pulse 70 Temp 97.3 °F (36.3 °C) Resp 18 Ht 6' 2\" (1.88 m) Wt 174 lb 2.6 oz (79 kg) SpO2 98% BMI 22.36 kg/m² General: Patient is well developed, well-nourished in no acute distress HEENT: Normocephalic and atraumatic. No scleral icterus. Pupils are equal, round and reactive to light and accomodation. No conjunctival injection. Oropharynx is clear. Neck: Supple. No evidence of thyroid enlargements or lymphadenopathy. JVD: Cannot be appreciated Lungs: Breath sounds are equal and clear bilaterally. No wheezes, rhonchi, or rales. Heart: Regular rate and rhythm with normal S1 and S2. No murmurs, gallops or rubs. Abdomen: Soft, no mass or tenderness. No organomegaly or hernia. Bowel sounds present. Genitourinary and rectal: deferred Extremities: No cyanosis, clubbing, or edema. Neurologic: No focal sensory or motor deficits are noted. Grossly intact. Psychiatric: Awake, alert an doriented x 3. Appropriate mood and affect. Skin: Warm, dry and well perfused. No lesions, nodules or rashes are noted. REVIEW OF SYSTEMS: 
General: Denies fever, night sweats. Ear, nose and throat: Denies difficulty hearing, sinus problems, runny nose, post-nasal drip, ringing in ears, mouth sores, loose teeth, ear pain, nosebleeds, sore throate, facial pain or numbess Cardiovascular: see above in the interval history Respiratory: Denies cough, wheezing, sputum production, hemoptysis. Gastrointestinal: Denies heartburn, constipation, intolerance to certain foods, diarrhea, abdominal pain, nausea, vomiting, difficulty swallowing, blood in stool Kidney and bladder: Denies painful urination, frequent urination, urgency, prostate problems and impotence Musculoskeletal: Denies joint pain, muscle weakness Skin and hair: Denies change in existing skin lesions, hair loss or increase, breast changes PAST MEDICAL HISTORY: 
Past Medical History:  
Diagnosis Date  Degenerative disc disease, lumbar  Heart failure ([facility]  High cholesterol  Hypertension  Paroxysmal atrial fibrillation ([facility] 2019  Spinal stenosis PAST SURGICAL HISTORY: 
Past Surgical History:  
Procedure Laterality Date  COLONOSCOPY Left 2019 COLONOSCOPY at bedside performed by Mark Briggs MD at [address]  HX CORONARY ARTERY BYPASS GRAFT    
 triple  HX HERNIA REPAIR    
 HX IMPLANTABLE CARDIOVERTER DEFIBRILLATOR  IN CARDIOVERSION ELECTIVE ARRHYTHMIA EXTERNAL N/A 6/10/2019 EP CARDIOVERSION performed by Franklyn Lynch MD at [facility], [facility] [address] CATH LAB  IN CARDIOVERSION ELECTIVE ARRHYTHMIA EXTERNAL N/A 2019 EP CARDIOVERSION performed by Chirag Duvall MD at [place] [address], [facility] [address] CATH LAB  IN INSJ/RPLCMT PERM DFB W/TRNSVNS LDS 1/DUAL CHMBR N/A 2019 INSERT ICD BIV MULTI performed by Ruth Emmanuel MD at [place] [address], [facility] CATH LAB  IN TCAT IMPL WRLS P-ART PRS SNR L-T HEMODYN MNTR N/A 2019 IMPLANT HEART FAILURE MONITORING DEVICE performed by Mounika Bernard MD at [place] [address], [facility] [address] CATH LAB FAMILY HISTORY: 
Family History Problem Relation Age of Onset  Lung Disease Mother  Hypertension Mother Hershell Joyce Arthritis-osteo Mother  Heart Disease Mother  Heart Disease Father  Diabetes Father SOCIAL HISTORY: 
Social History Socioeconomic History  Marital status:  Spouse name: Not on file  Number of children: Not on file  Years of education: Not on file  Highest education level: Not on file Tobacco Use  Smoking status: Former Smoker Last attempt to quit: 2010 Years since quittin.0  Smokeless tobacco: Never Used Substance and Sexual Activity  Alcohol use: Not Currently Comment: rarely  Drug use: Never Other Topics Concern LABORATORY RESULTS: 
  
Labs Latest Ref Rng & Units 12/2/2019 12/1/2019 11/30/2019 11/29/2019 11/28/2019 11/27/2019 11/26/2019 WBC 4.1 - 11.1 K/uL 5.8 7.4 7.3 7.9 7.1 7.0 7.5  
RBC 4.10 - 5.70 M/uL 2.63(L) 2.43(L) 2.82(L) 2.69(L) 2.83(L) 2.91(L) 3.13(L) Hemoglobin 12.1 - 17.0 g/dL 8. 0(L) 7. 3(L) 8.4(L) 8. 1(L) 8.5(L) 8. 9(L) 9.4(L) Hematocrit 36.6 - 50.3 % 26. 2(L) 24. 0(L) 27. 9(L) 26. 7(L) 27. 9(L) 28. 6(L) 30. 9(L) MCV 80.0 - 99.0 FL 99. 6(H) 98.8 98.9 99. 3(H) 98.6 98.3 98.7 Platelets 818 - 652 K/uL 241 265 244 219 216 222 194 Lymphocytes 12 - 49 % - - - - - - - Monocytes 5 - 13 % - - - - - - - Eosinophils 0 - 7 % - - - - - - - Basophils 0 - 1 % - - - - - - - Albumin 3.5 - 5.0 g/dL 2. 6(L) 2. 6(L) 2. 4(L) 2. 2(L) 2. 2(L) 2. 2(L) 2. 1(L) Calcium 8.5 - 10.1 MG/DL 9.0 8.8 8.7 8.6 8.4(L) 8. 3(L) 8.6 SGOT 15 - 37 U/L 15 14(L) 15 12(L) 13(L) 10(L) 11(L) Glucose 65 - 100 mg/dL 91 92 118(H) 141(H) 135(H) 95 161(H) BUN 6 - 20 MG/DL 21(H) 20 19 17 16 20 20 Creatinine 0.70 - 1.30 MG/DL 1.70(H) 1.65(H) 1.55(H) 1.33(H) 1.12 1.08 1.10 Sodium 136 - 145 mmol/L 135(L) 133(L) 134(L) 131(L) 132(L) 134(L) 134(L) Potassium 3.5 - 5.1 mmol/L 5.1 4.5 4.1 4.2 4.1 4.1 4.4 TSH 0.36 - 3.74 uIU/mL - - - - - - -  
PSA 0.01 - 4.0 ng/mL - - - - - - -  
LDH 85 - 241 U/L 271(H) 271(H) 256(H) 246(H) 218 230 235 CEA ng/mL - - - - - - - Some recent data might be hidden Lab Results Component Value Date/Time TSH 2.40 10/25/2019 07:39 PM  
 TSH 2.45 06/01/2019 04:16 AM  
 
 
ALLERGY: 
No Known Allergies CURRENT MEDICATIONS: 
 
Current Facility-Administered Medications:  
  milrinone (PRIMACOR) 20 MG/100 ML D5W infusion, 0.125 mcg/kg/min, IntraVENous, TITRATE, Raul Vivas MD, Last Rate: 3 mL/hr at 12/02/19 1247, 0.125 mcg/kg/min at 12/02/19 1247   bumetanide (BUMEX) injection 1 mg, 1 mg, IntraVENous, BID, Marta Herrera NP 
   warfarin (COUMADIN) tablet 2.5 mg, 2.5 mg, Oral, ONCE, Odilon Herrera NP 
  lactobac ac& pc-s.therm-b.anim (TIAN Q/RISAQUAD), 1 Cap, Oral, DAILY, Zulma Neely MD, 1 Cap at 12/02/19 1307   dronabinol (MARINOL) capsule 5 mg, 5 mg, Oral, ACB&D, Polliard, Antwan Hoof T, NP, 5 mg at 12/02/19 0634 
  albumin human 25% (BUMINATE) solution 12.5 g, 12.5 g, IntraVENous, BID, Pollkennedyd, Antwan Jordanof T, NP, 12.5 g at 12/02/19 7091   [Held by provider] digoxin (LANOXIN) tablet 0.25 mg, 0.25 mg, Oral, DAILY, Polliard, Antwan Jordanof T, NP, 0.25 mg at 12/02/19 6924   potassium chloride SR (KLOR-CON 10) tablet 60 mEq, 60 mEq, Oral, TID, Katelynnd, Antwan LOPEZ, NP, 60 mEq at 12/02/19 0843 
  magnesium oxide (MAG-OX) tablet 400 mg, 400 mg, Oral, BID, Quentin Davis NP, 400 mg at 12/02/19 6293   oxyCODONE IR (ROXICODONE) tablet 5 mg, 5 mg, Oral, Q6H PRN, Won Toussaint MD, 5 mg at 12/01/19 2150   mirtazapine (REMERON) tablet 15 mg, 15 mg, Oral, QHS, Quentin Davis NP, 15 mg at 12/01/19 2150   balsam peru-castor oil (VENELEX) ointment, , Topical, TID, Claire Andrews MD 
  tamsulosin [facility]) capsule 0.4 mg, 0.4 mg, Oral, DAILY, Logan Kincaid MD, 0.4 mg at 12/02/19 3667   aspirin chewable tablet 81 mg, 81 mg, Oral, DAILY, Shana Sims NP, 81 mg at 12/02/19 1211   sildenafil (antihypertensive) (REVATIO) tablet 20 mg, 20 mg, Oral, TID, Levi, Shana B, NP, 20 mg at 12/02/19 7398   pantoprazole (PROTONIX) tablet 40 mg, 40 mg, Oral, ACB, Levi, Shana B, NP, 40 mg at 12/02/19 2288   insulin lispro (HUMALOG) injection, , SubCUTAneous, AC&HS, Levi, Shana B, NP, 2 Units at 12/02/19 1125   Warfarin MD/NP dosing, , Other, PRN, Mounika Altman MD 
  epoetin yvonne-epbx (RETACRIT) injection 20,000 Units, 20,000 Units, SubCUTAneous, Q7D, Fatimah Shaikh MD, 20,000 Units at 11/26/19 8610   lidocaine (LIDODERM) 5 % patch 1 Patch, 1 Patch, TransDERmal, Q24H, Eron Gayle, NP, 1 Patch at 12/01/19 2207   sodium chloride (NS) flush 5-40 mL, 5-40 mL, IntraVENous, Q8H, Brittany Roman MD, 10 mL at 12/02/19 7768   sodium chloride (NS) flush 5-40 mL, 5-40 mL, IntraVENous, PRN, Brittany Roman MD 
  acetaminophen (TYLENOL) tablet 650 mg, 650 mg, Oral, Q6H PRN, Brittany Roman MD, 650 mg at 12/02/19 0634 
  naloxone (NARCAN) injection 0.4 mg, 0.4 mg, IntraVENous, PRN, Brittany Roman MD 
  ondansetron [facility]) injection 4 mg, 4 mg, IntraVENous, Q4H PRN, Brittany Roman MD, 4 mg at 11/20/19 2000 
  albuterol-ipratropium (DUO-NEB) 2.5 MG-0.5 MG/3 ML, 3 mL, Nebulization, Q6H PRN, Brittany Roman MD, 3 mL at 11/30/19 0526 
  senna-docusate (PERICOLACE) 8.6-50 mg per tablet 1 Tab, 1 Tab, Oral, DAILY, Brittany Roman MD, Stopped at 12/02/19 0900   polyethylene glycol (MIRALAX) packet 17 g, 17 g, Oral, DAILY, Brittany Roman MD, Stopped at 12/02/19 0900 
  bisacodyl (DULCOLAX) tablet 5 mg, 5 mg, Oral, DAILY PRN, Brittany Roman MD 
  sucralfate (CARAFATE) tablet 1 g, 1 g, Oral, AC&HS, Brittany Roman MD, 1 g at 12/02/19 1117 
  influenza vaccine 2019-20 (6 mos+)(PF) (FLUARIX/FLULAVAL/FLUZONE QUAD) injection 0.5 mL, 0.5 mL, IntraMUSCular, PRIOR TO DISCHARGE, Olaf Altman MD 
  hydrALAZINE (APRESOLINE) 20 mg/mL injection 20 mg, 20 mg, IntraVENous, Q6H PRN, Shana Sims NP, 20 mg at 11/19/19 0547 
  dextrose 10 % infusion 125-250 mL, 125-250 mL, IntraVENous, PRN, Carolin Patterson MD, Stopped at 11/18/19 0700 Thank you for allowing me to participate in this patient's care. Chino Primo, [facility] [place] [address], [address] Phone: [phone] Fax: [phone] [facility] ATTENDING ADDENDUM Patient was seen and examined in person. Data and notes were reviewed. I have discussed and agree with the plan as noted in the NP note above without further additions. Rosanna Claros MD PhD 
[place] [address] [facility]

## 2019-12-02 NOTE — PROGRESS NOTES
Bedside and Verbal shift change report given to Kerrie Prince RN (oncoming nurse) by Jona Libman, RN (offgoing nurse). Report included the following information SBAR, Kardex, ED Summary, OR Summary, Procedure Summary, Intake/Output, MAR, Accordion and Recent Results.

## 2019-12-02 NOTE — PROGRESS NOTES
JESÚS: 
 
Patient not medically stable for discharge at this time - S/P LVAD placement on 11/18/19 and S/p Left Brachail Thrombectomy PCD #7. 
 
-Patient is setup with [address] [facility] following for SN and PT/?OT at this time - will await orders for skilled home health prior to discharge - Wife and Manuel العلي, son to continue to receive family education for drive line dressing changes and patient to continue to receive education on management of LVAD device - Family to transport to home upon discharge- 
 
- Will require 5 day follow-up with [facility] upon discharge. Celina Geronimo, MSW Samy Norman

## 2019-12-03 NOTE — PROGRESS NOTES
NUTRITION COMPLETE ASSESSMENT 
 
RECOMMENDATIONS:  
1. Diet per SLP recommendations - Assist with tray-set up as needed with encouragement of supplements/high protein foods - Call for alternative trays if pt does not like food 2. Add \"artifical saliva\" PRN with complaints of dry mouth 3. Continue to hold bowel regimen if loose stools - or consider switch to PRN Interventions/Plan:  
Food/Nutrient Delivery:  (snacks, extra sauce/ gravy) Commercial supplement(d/c ) Assessment:  
Reason for Assessment: Reassessment Diet: 3-4g NA, 1500ml fluid restriction, extra gravy/sauce added to trays. + snacks Supplements: None - Did not like Nutritionally Significant Medications: [x] Reviewed & Includes: albumin, marinol (5mg BID), retacrit, SSI, Bhakti-Q, mag ox, remeron, protonix, miralax, KCl, pericolace, carafate 4x/day; zofran Meal Intake:  
Patient Vitals for the past 100 hrs: 
 % Diet Eaten 12/03/19 1118 75 % 12/03/19 0743 100 % 12/02/19 0841 50 % 12/01/19 1146 15 % 12/01/19 0821 100 % 11/30/19 1222 75 % 11/30/19 0750 75 % 11/29/19 1821 75 % 11/29/19 1307 75 % Current Hospitalization:  
Appetite: Fair PO Ability: Independent Average po intake:50-75% Average supplements intake: 0 Subjective: \"I do ok with breakfast.\" Pt confused periodically through conversation. Objective: 
Pt admitted for CHF. PMHx: HTN, CKD, chronic systolic heart failure, depression, others noted. S/p removal of impella (placed 10/29) and LVAD placement on 11/18. Medications being adjusted for fluid status and flow for LVAD. L arm clot s/p thrombectomy on 11/25. Marinol rx since before LVAD, dose increased 11/26; remeron added 11/23. Appetite improving some. Does best with breakfast since he likes those foods. Attempted to get preferences but pt confused. Will continue snacks as ordered.  Previous complaints of dry mouth - consider trial of artifical saliva PRN. Concerns for possible aspiration with straw and after meals. MBS completed today, full report to follow. RN reports some possible aspiration with liquids when drinking quickly. Will follow for final SLP recommendations, would be good if able to avoid diet restrictions. Severe wt loss over past 6 months. Severe muscle/fat wasting. Pt does met criteria for severe malnutrition however low prealbumin is NOT a good sole indicator since with negative acute-phase protein and pt with chronic pro-inflammatory condition. Last 3 Recorded Weights in this Encounter 12/01/19 8505 12/02/19 9148 12/03/19 0821 Weight: 84.1 kg (185 lb 6.5 oz) 79 kg (174 lb 2.6 oz) 79.6 kg (175 lb 7.8 oz) Estimated Nutrition Needs:  
Kcals/day: 2000 Kcals/day(3094-1512 (MSJ x 1.1-1.2) Protein: 100 g(1.2g/kg) Fluid: (1 ml/kcal) Based On: [place] 1898 Weight Used: Actual wt(83 kg) Pt expected to meet estimated nutrient needs:  []   Yes     []  No [x] Unable to predict at this time Nutrition Diagnosis:  
1. Unintended weight loss(malnutrition) related to CHF vs depression vs malignancy as evidenced by >10% weight loss x 6 months; severe muscle/fat wasting 2. Inadequate oral intake(improving slowly) related to poor appetite as evidenced by post-op LVAD, marinol rx, >50-75% meals consumed Goals:   
 Consumption of at least avg 75% meals per day in 5-7 days; wt maintenance Monitoring & Evaluation: - Total energy intake - Weight/weight change(swallow fx) Previous Nutrition Goals Met:   Progressing Previous Recommendations:    No 
 
Education & Discharge Needs: 
 [] None Identified 
 [x] Identified and addressed [x] Participated in care plan, discharge planning, and/or interdisciplinary rounds Cultural, Church and ethnic food preferences identified: None Skin Integrity: []Intact  [x]Other: sternal wound Edema: []None [x]Other: 2+ generalized, 1+ BLLE Last BM: 12/3 - loose Food Allergies: [x]None []Other Diet Restrictions: Cultural/Advent Preference(s): None Anthropometrics:   
Weight Loss Metrics 12/3/2019 10/25/2019 10/25/2019 10/25/2019 9/30/2019 9/18/2019 9/16/2019 Today's Wt 175 lb 7.8 oz - 193 lb 6.4 oz - 199 lb 14.4 oz 196 lb 199 lb BMI - 22.53 kg/m2 - 24.83 kg/m2 25.67 kg/m2 25.16 kg/m2 25.55 kg/m2 Weight Source: Standing scale (comment) Height: 6' 2\" (188 cm), Body mass index is 22.53 kg/m². IBW : 86.2 kg (190 lb), % IBW (Calculated): 96.32 % 
 ,   
 
Labs:   
Lab Results Component Value Date/Time Sodium 135 (L) 12/03/2019 03:27 AM  
 Potassium 4.9 12/03/2019 03:27 AM  
 Chloride 103 12/03/2019 03:27 AM  
 CO2 28 12/03/2019 03:27 AM  
 Glucose 90 12/03/2019 03:27 AM  
 BUN 21 (H) 12/03/2019 03:27 AM  
 Creatinine 1.78 (H) 12/03/2019 03:27 AM  
 Calcium 9.1 12/03/2019 03:27 AM  
 Magnesium 2.5 (H) 12/03/2019 03:27 AM  
 Phosphorus 3.3 11/27/2019 04:18 AM  
 Albumin 2.4 (L) 12/03/2019 03:27 AM  
 
Lab Results Component Value Date/Time Hemoglobin A1c 6.6 (H) 10/25/2019 02:56 PM  
 
 
Sonali Meyer, JEANNINE [address] [place] , Pager #[phone]

## 2019-12-03 NOTE — PROGRESS NOTES
[facility] 
 [address] [facility], [address] [facility] Phone: [phone]   [phone]   
  
Nephrology Progress Note Sona Kiser     1950     [id number] Date of Admission : 10/25/2019 
12/03/19 CC:  Follow up for JUAN J, CKD, Hyponatremia Assessment and Plan JUAN J on CKD 
- Noted increase in LVAD flow rates + Thoracocentesis yesterday  
- although his volume status looks better today, he reports significant SOB  
- noted plans to hold Bumex -- but perhaps start low dose if LVAD flows cannot be increased any more Hyponatremia: 
- 2/2 CHF 
- stable  
  
Gross hematuria, BPH w/ retention: 
- off CBI pre VAD. cysto pre op showed catheter related Cystitis @ postr wall  
- neal had to be replaced due to urinary retention - On flomax - timing of voiding trail - he requested to wait for another 1-2 days Acute on Chronic HFrEF  
- EF 16-20%, NYHA Class IV , hx of VF arrest - s/p AICD 
- Impella insertion 10/29- removed 11/18  
- s/p LVAD placement on 11/18 CKD Stage III  
- Mild Left renal atrophy at baseline Hoarseness, vocal cord paralysis, mediastinal adenopathy: 
- will need eventual PET/CT 
  
L arm clot s/p thrombectomy on 11/25 JOSE on CPAP  
  
PAF s/p DCCV 6/19 
  
Anemia: 
- from blood loss s/p multiple blood products - s/p IV iron,  Repeat iron studies ok 
- on PAVEL q7 d Serratia and Enteroccocus UTI: 
- completed abx Interval History:   
Seen and examined. S/p thoracocentesis and increased in VAD flows to 5400 RPM yesterday K and Mg trending up Review of Systems: Pertinent items are noted in HPI. Current Medications:  
Current Facility-Administered Medications Medication Dose Route Frequency  albuterol-ipratropium (DUO-NEB) 2.5 MG-0.5 MG/3 ML  3 mL Nebulization Q6H RT  
 [Held by provider] bumetanide (BUMEX) injection 1 mg  1 mg IntraVENous BID  lactobac ac& pc-s.therm-b.anim (TIAN Q/RISAQUAD)  1 Cap Oral DAILY  dronabinol (MARINOL) capsule 5 mg  5 mg Oral ACB&D  
 albumin human 25% (BUMINATE) solution 12.5 g  12.5 g IntraVENous BID  potassium chloride SR (KLOR-CON 10) tablet 60 mEq  60 mEq Oral TID  magnesium oxide (MAG-OX) tablet 400 mg  400 mg Oral BID  
 oxyCODONE IR (ROXICODONE) tablet 5 mg  5 mg Oral Q6H PRN  mirtazapine (REMERON) tablet 15 mg  15 mg Oral QHS  balsam peru-castor oil (VENELEX) ointment   Topical TID  tamsulosin (FLOMAX) capsule 0.4 mg  0.4 mg Oral DAILY  aspirin chewable tablet 81 mg  81 mg Oral DAILY  sildenafil (antihypertensive) (REVATIO) tablet 20 mg  20 mg Oral TID  pantoprazole (PROTONIX) tablet 40 mg  40 mg Oral ACB  insulin lispro (HUMALOG) injection   SubCUTAneous AC&HS  Warfarin MD/NP dosing   Other PRN  
 epoetin yvonne-epbx (RETACRIT) injection 20,000 Units  20,000 Units SubCUTAneous Q7D  
 lidocaine (LIDODERM) 5 % patch 1 Patch  1 Patch TransDERmal Q24H  
 sodium chloride (NS) flush 5-40 mL  5-40 mL IntraVENous Q8H  
 sodium chloride (NS) flush 5-40 mL  5-40 mL IntraVENous PRN  
 acetaminophen (TYLENOL) tablet 650 mg  650 mg Oral Q6H PRN  
 naloxone (NARCAN) injection 0.4 mg  0.4 mg IntraVENous PRN  
 ondansetron (ZOFRAN) injection 4 mg  4 mg IntraVENous Q4H PRN  
 senna-docusate (PERICOLACE) 8.6-50 mg per tablet 1 Tab  1 Tab Oral DAILY  polyethylene glycol (MIRALAX) packet 17 g  17 g Oral DAILY  bisacodyl (DULCOLAX) tablet 5 mg  5 mg Oral DAILY PRN  
 sucralfate (CARAFATE) tablet 1 g  1 g Oral AC&HS  influenza vaccine 2019-20 (6 mos+)(PF) (FLUARIX/FLULAVAL/FLUZONE QUAD) injection 0.5 mL  0.5 mL IntraMUSCular PRIOR TO DISCHARGE  hydrALAZINE (APRESOLINE) 20 mg/mL injection 20 mg  20 mg IntraVENous Q6H PRN  
 dextrose 10 % infusion 125-250 mL  125-250 mL IntraVENous PRN No Known Allergies Objective: 
Vitals:   
Vitals:  
 12/03/19 0320 12/03/19 5600 12/03/19 0743 12/03/19 1118 BP:      
Pulse: 71  72 70 Resp: 20  21 20  
 Temp: 98.4 °F (36.9 °C)  97.8 °F (36.6 °C) 98.2 °F (36.8 °C) SpO2: 96%  95% 95% Weight:  79.6 kg (175 lb 7.8 oz) Height:      
 
Intake and Output: 
12/03 0701 - 12/03 1900 In: 113.2 [P.O.:100; I.V.:13.2] Out: 300 [Urine:300] 12/01 1901 - 12/03 0700 In: 2073.5 [P.O.:800; I.V.:1273.5] Out: 3700 [[id number]] Physical Examination: 
 
General: Awake and alert Neck:  Lines + Resp:  Decreased bibasilar breath sounds CV:  VADsounds  2-3+ b/l LE edema GI:  Soft, NT, + Bowel sounds Neurologic:  AAO X3  
:  + neal [x]    High complexity decision making was performed 
[x]    Patient is at high-risk of decompensation with multiple organ involvement Lab Data Personally Reviewed: I have reviewed all the pertinent labs, microbiology data and radiology studies during assessment. Recent Labs 12/03/19 0327 12/02/19 0255 12/01/19 0321 * 135* 133* K 4.9 5.1 4.5  
 103 98 CO2 28 30 32 GLU 90 91 92 BUN 21* 21* 20  
CREA 1.78* 1.70* 1.65* CA 9.1 9.0 8.8 MG 2.5* 2.4 2.4 ALB 2.4* 2.6* 2.6* SGOT 17 15 14* ALT 8* 7* 6* INR 2.6* 2.7* 2.3* Recent Labs 12/03/19 
0327 12/02/19 
0255 12/01/19 0321 WBC 5.7 5.8 7.4 HGB 8.4* 8.0* 7.3* HCT 28.3* 26.2* 24.0*  
 241 265 No results found for: SDES Lab Results Component Value Date/Time Culture result: HEAVY ENTEROCOCCUS SPECIES NOTED (A) 11/27/2019 11:10 AM  
 Culture result: LIGHT YEAST (A) 11/27/2019 11:10 AM  
 Culture result: NO GROWTH 5 DAYS 11/12/2019 11:18 AM  
 Culture result: SERRATIA MARCESCENS (A) 10/31/2019 10:05 AM  
 Culture result: SERRATIA MARCESCENS (2ND COLONY TYPE/STRAIN) (A) 10/31/2019 10:05 AM  
 Culture result: ENTEROCOCCUS FAECALIS GROUP D (A) 10/31/2019 10:05 AM  
 
Recent Results (from the past 24 hour(s)) PROCALCITONIN Collection Time: 12/02/19 11:40 AM  
Result Value Ref Range Procalcitonin 0.17 ng/mL CK  Collection Time: 12/02/19 11:40 AM  
 Result Value Ref Range CK 23 (L) 39 - 308 U/L  
POC G3 - PUL Collection Time: 12/02/19 12:29 PM  
Result Value Ref Range pH (POC) 7.455 (H) 7.35 - 7.45    
 pCO2 (POC) 37.0 35.0 - 45.0 MMHG  
 pO2 (POC) 76 (L) 80 - 100 MMHG  
 HCO3 (POC) 26.0 22 - 26 MMOL/L  
 sO2 (POC) 96 92 - 97 % Base excess (POC) 2 mmol/L Site RIGHT RADIAL Device: OTHER Allens test (POC) YES Specimen type (POC) ARTERIAL Total resp. rate 24 GLUCOSE, POC Collection Time: 12/02/19  4:13 PM  
Result Value Ref Range Glucose (POC) 98 65 - 100 mg/dL Performed by Camilo Ramirez, POC Collection Time: 12/02/19  9:45 PM  
Result Value Ref Range Glucose (POC) PLEASE DISREGARD RESULTS 65 - 100 mg/dL Performed by Waqar Zimmer GLUCOSE, POC Collection Time: 12/02/19  9:47 PM  
Result Value Ref Range Glucose (POC) 131 (H) 65 - 100 mg/dL Performed by Waqar Zimmer GLUCOSE, POC Collection Time: 12/02/19  9:48 PM  
Result Value Ref Range Glucose (POC) 119 (H) 65 - 100 mg/dL Performed by Waqar Zimmer METABOLIC PANEL, COMPREHENSIVE Collection Time: 12/03/19  3:27 AM  
Result Value Ref Range Sodium 135 (L) 136 - 145 mmol/L Potassium 4.9 3.5 - 5.1 mmol/L Chloride 103 97 - 108 mmol/L  
 CO2 28 21 - 32 mmol/L Anion gap 4 (L) 5 - 15 mmol/L Glucose 90 65 - 100 mg/dL BUN 21 (H) 6 - 20 MG/DL Creatinine 1.78 (H) 0.70 - 1.30 MG/DL  
 BUN/Creatinine ratio 12 12 - 20 GFR est AA 46 (L) >60 ml/min/1.73m2 GFR est non-AA 38 (L) >60 ml/min/1.73m2 Calcium 9.1 8.5 - 10.1 MG/DL Bilirubin, total 0.9 0.2 - 1.0 MG/DL  
 ALT (SGPT) 8 (L) 12 - 78 U/L  
 AST (SGOT) 17 15 - 37 U/L Alk. phosphatase 103 45 - 117 U/L Protein, total 6.1 (L) 6.4 - 8.2 g/dL Albumin 2.4 (L) 3.5 - 5.0 g/dL Globulin 3.7 2.0 - 4.0 g/dL A-G Ratio 0.6 (L) 1.1 - 2.2 MAGNESIUM Collection Time: 12/03/19  3:27 AM  
Result Value Ref Range Magnesium 2.5 (H) 1.6 - 2.4 mg/dL CBC W/O DIFF Collection Time: 12/03/19  3:27 AM  
Result Value Ref Range WBC 5.7 4.1 - 11.1 K/uL  
 RBC 2.84 (L) 4.10 - 5.70 M/uL HGB 8.4 (L) 12.1 - 17.0 g/dL HCT 28.3 (L) 36.6 - 50.3 % MCV 99.6 (H) 80.0 - 99.0 FL  
 MCH 29.6 26.0 - 34.0 PG  
 MCHC 29.7 (L) 30.0 - 36.5 g/dL  
 RDW 18.0 (H) 11.5 - 14.5 % PLATELET 165 389 - 824 K/uL MPV 9.0 8.9 - 12.9 FL  
 NRBC 0.0 0  WBC ABSOLUTE NRBC 0.00 0.00 - 0.01 K/uL PROTHROMBIN TIME + INR Collection Time: 12/03/19  3:27 AM  
Result Value Ref Range INR 2.6 (H) 0.9 - 1.1 Prothrombin time 25.5 (H) 9.0 - 11.1 sec PTT Collection Time: 12/03/19  3:27 AM  
Result Value Ref Range aPTT 41.9 (H) 22.1 - 32.0 sec  
 aPTT, therapeutic range     58.0 - 77.0 SECS  
NT-PRO BNP Collection Time: 12/03/19  3:27 AM  
Result Value Ref Range NT pro-BNP 11,335 (H) <125 PG/ML  
LD Collection Time: 12/03/19  3:27 AM  
Result Value Ref Range  (H) 85 - 241 U/L PREALBUMIN Collection Time: 12/03/19  3:27 AM  
Result Value Ref Range Prealbumin 10.3 (L) 20.0 - 40.0 mg/dL DIGOXIN Collection Time: 12/03/19  3:29 AM  
Result Value Ref Range Digoxin level 2.6 (HH) 0.90 - 2.00 NG/ML  
TSH 3RD GENERATION Collection Time: 12/03/19  3:29 AM  
Result Value Ref Range TSH 2.30 0.36 - 3.74 uIU/mL T4, FREE Collection Time: 12/03/19  3:29 AM  
Result Value Ref Range T4, Free 1.5 0.8 - 1.5 NG/DL  
GLUCOSE, POC Collection Time: 12/03/19  7:04 AM  
Result Value Ref Range Glucose (POC) 100 65 - 100 mg/dL Performed by Alvin Adams GLUCOSE, POC Collection Time: 12/03/19 11:17 AM  
Result Value Ref Range Glucose (POC) 121 (H) 65 - 100 mg/dL Performed by Leanne Branch MD

## 2019-12-03 NOTE — PROGRESS NOTES
[address] in [place], [place] Played Coumadin video for patient, no questions at this time. Will reinforce video again as patient states he is tired. Patient's wife is not in room. Will have patient's wife watch Coumadin video before discharge. Luciano Capellan RN 
VAD Coordinator

## 2019-12-03 NOTE — PROGRESS NOTES
Problem: Mobility Impaired (Adult and Pediatric) Goal: *Acute Goals and Plan of Care (Insert Text) Description FUNCTIONAL STATUS PRIOR TO ADMISSION: Patient was modified independent using a single point cane for functional mobility. Patient reports an increasingly sedentary lifestyle 2* fatigue and SOB/dyspnea. Retired (so is his wife). Patient reports x 3 falls within the last couple of weeks. Patient is wearing either nasal cannula or CPAP at night. LVAD work-up has been initiated. HOME SUPPORT PRIOR TO ADMISSION: The patient lived with his wife, but did not require physical assistance. Physical Therapy Goals: 
 
Re-evaluation completed 11/20/2019 and new goals formulated following LVAD implantation. Reviewed and cont 12/3/2019 1. Patient will move from supine to sit and sit to supine, scoot up and down and roll side to side in bed with minimal assistance/contact guard assist within 7 days. 2.  Patient will perform sit to/from stand with minimal assistance/contact guard assist within 7 days. 3.  Patient will ambulate 150 feet with least restrictive assistive device and minimal assistance/contact guard assist within 7 days. 4.  Patient will ascend/descend 4 stairs with handrail(s) with minimal assistance/contact guard assist within 7 days. 5.  Patient will perform cardiac exercises per protocol with supervision within 7 days. 6.  Patient will verbally and functionally recall 3/3 sternal precautions within 7 days. 7.  Patient will perform power exchange for power module to/from battery with moderate assistance  within 7 days. Initiated 10/27/2019; updated 11/15/2019 1. Patient will move from supine to sit and sit to supine, scoot up and down and roll side to side in bed with independence within 7 days. 2.  Patient will perform sit to/from stand with supervision/set-up within 7 days.  
3.  Patient will ambulate 200 feet with least restrictive assistive device and supervision/set-up within 7 days. 4.  Patient will ascend/descend 4 stairs with  handrail(s) with supervision/set-up within 7 days for functional strengthening and community reintegration. [facility] 5.  Patient will verbally and functionally recall 3/3 sternal precautions within 7 days in preparation for LVAD implantation. 6.  Patient will perform a mock power exchange for power module to/from battery with supervision/set-up within 7 days in preparation for LVAD implantation. 1.  Patient will move from supine to sit and sit to supine, scoot up and down and roll side to side in bed with independence within 7 days. 2.  Patient will perform sit to/from stand with supervision/set-up within 7 days. 3.  Patient will ambulate 150 feet with least restrictive assistive device and supervision/set-up within 7 days. 4.  Patient will ascend/descend 4 stairs with  handrail(s) with supervision/set-up within 7 days for functional strengthening and community reintegration. [facility] 5.  Patient will verbally and functionally recall 3/3 sternal precautions within 7 days in preparation for LVAD implantation. 6.  Patient will perform a mock power exchange for power module to/from battery with supervision/set-up within 7 days in preparation for LVAD implantation. Outcome: Progressing Towards Goal 
 
PHYSICAL THERAPY TREATMENT: WEEKLY REASSESSMENT Patient: Jimmy Javier (75 y.o. male) Date: 12/3/2019 Primary Diagnosis: Acute decompensated heart failure ([facility] [address] [address] [I50.9] Procedure(s) (LRB): LEFT BRACHIAL THROMBECTOMY (Left) 8 Days Post-Op Precautions:   Fall, Sternal 
 
 
ASSESSMENT Patient continues with skilled PT services and is progressing towards goals. Patient remains agreeable and highly motivated for progress but limited by anxiety and low endurance. Sit to stand required moderate assist x2 and additional time from chair.  Initial standing balance with strong retropulsion and moderate assist to remain standing d/t posterior shifted center of gravity. Cued for WB through toes, standing erect, and final trial with RW to facilitate an anterior weight shift. Unable to acquire his balance standing with 4 trials completed focusing on balance and endurance. Unless significant progress is made towards functional independence, anticipate discharge to an IP rehab setting when medically stable. Patient's progression toward goals since last assessment: inconsistent progression of gait over 6-10' Current Level of Function Impacting Discharge (mobility/balance): mod-max x2 for transfers Other factors to consider for discharge: still unable to manage power transfers PLAN : 
Goals have been updated based on progression since last assessment. Patient continues to benefit from skilled intervention to address the above impairments. Recommendations and Planned Interventions: bed mobility training, transfer training, gait training, therapeutic exercises, and neuromuscular re-education Frequency/Duration: Patient will be followed by physical therapy:  6 times a week to address goals. Recommendation for discharge: (in order for the patient to meet his/her long term goals) Therapy 3 hours per day 5-7 days per week This discharge recommendation: A follow-up discussion with the attending provider and/or case management is planned IF patient discharges home will need the following DME: to be determined (TBD) SUBJECTIVE:  
Patient stated i am so short of breath. I need to sit! Jordan Shipley OBJECTIVE DATA SUMMARY:  
HISTORY:   
Past Medical History:  
Diagnosis Date Degenerative disc disease, lumbar Heart failure ([address] High cholesterol Hypertension Paroxysmal atrial fibrillation ([facility] [address] 4/2/2019 Spinal stenosis Past Surgical History:  
Procedure Laterality Date COLONOSCOPY Left 11/11/2019 COLONOSCOPY at bedside performed by Isha Su MD at [address] HX CORONARY ARTERY BYPASS GRAFT    
 triple HX HERNIA REPAIR    
 HX IMPLANTABLE CARDIOVERTER DEFIBRILLATOR    
 OR CARDIOVERSION ELECTIVE ARRHYTHMIA EXTERNAL N/A 6/10/2019 EP CARDIOVERSION performed by Liz Renteria MD at [address], [facility] [address] CATH LAB  
 OR CARDIOVERSION ELECTIVE ARRHYTHMIA EXTERNAL N/A 6/18/2019 EP CARDIOVERSION performed by Connie May MD at [address] [facility] [address] CATH LAB  
 OR INSJ/RPLCMT PERM DFB W/TRNSVNS LDS 1/DUAL [facility] N/A 6/21/2019 INSERT ICD BIV MULTI performed by Alexi Mathew MD at [address] [facility]  CATH LAB  
 OR TCAT IMPL WRLS P-ART PRS SNR L-T HEMODYN MNTR N/A 9/18/2019 IMPLANT HEART FAILURE MONITORING DEVICE performed by Loi Mancia MD at [address] [facility]  CATH LAB Personal factors and/or comorbidities impacting plan of care:  
 
Home Situation Home Environment: Private residence # Steps to Enter: 0 One/Two Story Residence: One story Living Alone: No 
Support Systems: Spouse/Significant Other/Partner Patient Expects to be Discharged to[de-identified] Unknown Current DME Used/Available at Home: Cane, straight, Walker, rolling, CPAP Tub or Shower Type: Shower EXAMINATION/PRESENTATION/DECISION MAKING:  
Critical Behavior: 
Neurologic State: Restless Orientation Level: Oriented X4 Cognition: Appropriate for age attention/concentration Safety/Judgement: Decreased insight into deficits, Decreased awareness of need for safety, Decreased awareness of need for assistance Hearing: Auditory Auditory Impairment: None Functional Mobility: 
Bed Mobility: 
  
  
  
  
Transfers: 
Sit to Stand: Moderate assistance;Assist x2 Stand to Sit: Minimum assistance;Assist x2 Balance:  
Sitting: Impaired Sitting - Static: Fair (occasional) Sitting - Dynamic: Fair (occasional) Standing: Impaired Standing - Static: Poor Standing - Dynamic : Poor Pain Rating: Activity Tolerance:  
Poor, requires frequent rest breaks, and observed SOB with activity Maintained on 4L via nasal canula Please refer to the flowsheet for vital signs taken during this treatment. After treatment patient left in no apparent distress:  
Sitting in chair and Call bell within reach COMMUNICATION/EDUCATION:  
The patients plan of care was discussed with: Registered Nurse. Fall prevention education was provided and the patient/caregiver indicated understanding., Patient/family have participated as able in goal setting and plan of care. , and Patient/family agree to work toward stated goals and plan of care. Thank you for this referral. 
Vertie Fabry, PT, DPT Time Calculation: 27 mins

## 2019-12-03 NOTE — PROGRESS NOTES
1930: Bedside and Verbal shift change report given to Salma Conteh RN (oncoming nurse) by Kaye Gallagher RN (offgoing nurse). Report included the following information SBAR, Kardex, Intake/Output, MAR, Recent Results and Cardiac Rhythm AFib/Paced. 0730: Bedside and Verbal shift change report given to Junior Greenberg RN (oncoming nurse) by Salma Conteh RN (offgoing nurse). Report included the following information SBAR, Kardex, Intake/Output, MAR, Recent Results and Cardiac Rhythm AFib/Paced.

## 2019-12-03 NOTE — PROGRESS NOTES
Called and left message for Checo Collins NP regarding ability to travel off the floor for MBS Study based on chart review of concerns for s/s of aspiration with straw drinking and after meals. Recommendation has been to complete study once cleared by cardiac team.  Will follow for study if ok with medical team.  Thanks. Julio Carrillo M.CD. CCC-SLP Spoke with Checo Collins who indicates ok to do MBS study this afternoon. Will plan tentatively for 2pm this afternoon. Thanks. Julio Carrillo M.CD.  CCC-SLP

## 2019-12-03 NOTE — PROGRESS NOTES
0730: Bedside shift change report given to Alex Dominguez (oncoming nurse) by Dewayne Bynum (offgoing nurse). Report included the following information SBAR and Kardex. 1600: patient has been increasingly more confused and SOB. Will obtain ABG. 1745: ABG WNL. Patient still confused and with tremors. NP aware and will transfer patient to CVI. 1830: patients LVAD dressing changed with sterile procedure. 1900: TRANSFER - OUT REPORT: 
 
Verbal report given to Patrick Candelario RN(name) on Arianna Wynne  being transferred to CVI (unit) for routine progression of care Report consisted of patients Situation, Background, Assessment and  
Recommendations(SBAR). Information from the following report(s) SBAR and Kardex was reviewed with the receiving nurse. Lines: PICC Triple Lumen 71/57/52 Right;Basilic (Active) Central Line Being Utilized Yes 12/3/2019  3:10 PM  
Criteria for Appropriate Use Long term IV/antibiotic administration 12/3/2019  3:10 PM  
Site Assessment Clean, dry, & intact 12/3/2019 11:18 AM  
Phlebitis Assessment 0 12/3/2019 11:18 AM  
Infiltration Assessment 0 12/3/2019 11:18 AM  
Arm Circumference (cm) 27 cm 11/22/2019  3:05 PM  
Date of Last Dressing Change 11/30/19 12/3/2019  7:43 AM  
Dressing Status Clean, dry, & intact 12/3/2019  7:43 AM  
External Catheter Length (cm) 1 centimeters 11/22/2019  3:05 PM  
Dressing Type Disk with Chlorhexadine gluconate (CHG); Transparent;Tape;Stabilization/securement device 12/3/2019  7:43 AM  
Action Taken Open ports on tubing capped 12/3/2019  7:43 AM  
Hub Color/Line Status Gray;Capped 12/3/2019  7:43 AM  
Positive Blood Return (Site #1) Yes 12/3/2019  7:43 AM  
Hub Color/Line Status White;Capped 12/3/2019  7:43 AM  
Positive Blood Return (Site #2) No 12/3/2019  7:43 AM  
Hub Color/Line Status Red;Capped 12/3/2019  7:43 AM  
Positive Blood Return (Site #3) Yes 12/3/2019  7:43 AM  
Alcohol Cap Used Yes 12/3/2019  7:43 AM  
   
 Peripheral IV 11/16/19 Anterior;Distal;Right Forearm (Active) Site Assessment Clean, dry, & intact 12/3/2019  3:10 PM  
Phlebitis Assessment 0 12/3/2019  3:10 PM  
Infiltration Assessment 0 12/3/2019  3:10 PM  
Dressing Status Clean, dry, & intact 12/3/2019  3:10 PM  
Dressing Type Transparent;Tape 12/3/2019  3:10 PM  
Hub Color/Line Status Blue;Capped 12/3/2019  3:10 PM  
Action Taken Open ports on tubing capped 12/3/2019  3:10 PM  
Alcohol Cap Used Yes 12/3/2019  3:10 PM  
   
Peripheral IV 11/26/19 Right Antecubital (Active) Site Assessment Clean, dry, & intact 12/3/2019  3:10 PM  
Phlebitis Assessment 0 12/3/2019  3:10 PM  
Infiltration Assessment 0 12/3/2019  3:10 PM  
Dressing Status Clean, dry, & intact 12/3/2019  3:10 PM  
Dressing Type Transparent;Tape 12/3/2019  3:10 PM  
Hub Color/Line Status Brown;Capped 12/3/2019  3:10 PM  
Action Taken Open ports on tubing capped 12/3/2019  3:10 PM  
Alcohol Cap Used Yes 12/3/2019  3:10 PM  
  
 
Opportunity for questions and clarification was provided. Patient transported with: 
 Monitor Registered Nurse Problem: Falls - Risk of 
Goal: *Absence of Falls Description Document Viktoriya Whitmore Fall Risk and appropriate interventions in the flowsheet. Outcome: Progressing Towards Goal 
Note: Fall Risk Interventions: 
Mobility Interventions: Communicate number of staff needed for ambulation/transfer Mentation Interventions: Door open when patient unattended Medication Interventions: Evaluate medications/consider consulting pharmacy, Patient to call before getting OOB Elimination Interventions: Call light in reach History of Falls Interventions: Door open when patient unattended Problem: Patient Education: Go to Patient Education Activity Goal: Patient/Family Education Outcome: Progressing Towards Goal 
  
Problem: Pain Goal: *Control of Pain Outcome: Progressing Towards Goal 
  
Problem: Diabetes Self-Management Goal: *Disease process and treatment process Description Define diabetes and identify own type of diabetes; list 3 options for treating diabetes.  
Outcome: Progressing Towards Goal

## 2019-12-03 NOTE — PROGRESS NOTES
[address] [facility] [address]3 [facility] in [place], [place] Inpatient Progress Note Patient name: Priyanka Arora Patient : 1950 Patient MRN: [id number] Attending MD: Corin Odell MD 
Date of service: 19 Chief Complaint:  
Nisreen Arteaga azucena LVAD implant 
  
HPI: Sona Kiser is a 71y.o. year old pleasant white male with a history of HTN, HLD, JOSE, CAD s/p cardiac arrest VFib s/p CABG () c/b sternal would infection and sternectomy, ischemic cardiomyopathy LVEF 15-20%, s/p ICD and with LBBB. He was admitted with acute on chronic systolic heart failure with massive volume overload > 20 lbs, in the setting of atrial fibrillation s/p failed DCCV and underwent DCCV and RHC on .  S/p BiVICD on 19 with Dr. Tony Vivas. He was discharged home home on IV milrinone on 19. Jose L Bhandari has been followed closely by Dr. Darlin Thorne and the [facility]. 
  
Mr. Kiser was admitted for acute on chronic systolic heart failure. He underwent implantation of Impella 5.0 due to CS and has completed his LVAD evaluation. Jose L Bhandari meets criteria for implant of HM3 as DT. He was NYHA Class IV prior to implant of Impella 5.0, has LVEF < 15%, was intolerant of GDMT due to symptomatic hypotension and renal dysfunction. He remains dependent on temporary MCS support. RHC  revealed compensated hemodynamics on Impella. His renal function has improved dramatically with improvement in his hemodynamics. He is not a suitable transplant candidate due to single kidney, sternectomy, debility, and frailty. He was reviewed by Luisa Carson and felt to be a high risk heart transplant candidate due to multiple co-morbidities as well as the afore mentioned conditions.  He remained in the CCU and underwent a HM 3 implant as DT on . He was weaned off of pressors and transferred to [place] [address] where he continues to undergo PT/OT and hemodynamic optimization.   
  
24Hr Events 1.2L removed with right thoracentesis CXR improved Remains dypsneic, although SpO2 100% on RA  
RPMs increased to 5400 Cr worse 
  
Procedure:  Procedure(s): REMOVAL TEMPORARY LEFT VENTRICULAR ASSIST DEVICE, IMPLANTATION OF LEFT VENTRICULAR ASSIST DEVICE PERMANENT (VAD), ECC, JACQUE, EPI AORTIC US BY DR Luther Franz.    
POD:  14 
  
Impression / Plan:  
Heart Failure Status: NYHA Class IV INTERMACS Category 2 
  
Chronic systolic heart failure due to ICM, NYHA Class IV, EF < 15%, cardiogenic shock bridged with Impella 5.0 support to HM 3 implant S/p Impella removal and LVAD implant Continue RPMs at 5400 - may need to increase further TTE with bubble study to evaluate for PFO (+ ramp study) Discontinue milrinone due to dizziness Cont Sildenafil 20 mg PO TID - rx sent to local pharmacy to initiate PA Bumex 1 mg IV now No AA/ARB/ARNI post op- will start as appropriate Strict I/O, daily weights, Na+ restricted diet Continue LVAD education Daily dressing changes  
  
Anticoagulation for LVAD, INR goal 2-3 Continue ASA Cont coumadin tonight 2.5 mg 
  
Acute Respiratory Failure post op Remains dyspneic despite thoracentesis w/ 1.2L fluid removed Monitor CXRs ABG Bumex today TTE with Bubble study to evaluate for PFO Scheduled duo-nebs with spirometry to follow Pulmonary Consult Pulmonary hygiene Cont home CPAP- must use while sleeping  
  
Post op Pain PRN oxycodone Comfort measures  
  
L Brachial Artery Thrombosis s/p thrombectomy Surgical management per Dr. Gomez Castanon On warfarin, INR at goal 
Pain improved  
  
Swelling, pain of right arm, acute Doppler studies (-) for thrombus  
Continue AC Confirmed PICC placement 
  
CKD 3, atrophic left kidney Appreciate Nephrology assistance Assess diuretic dosing daily IV bumex Avoid nephrotoxins Trend labs ProNTBNP remains elevated 
  
CAD s/p CABG Cont low dose ASA- watch platelets No BB d/t RV failure Hold statin due to recent hepatic failure LHC performed 11/13 - low likelihood of benefit from revascularization  
  
Hx of VF arrest s/p BiV-ICD No recent shocks Keep K > 4 and Mg > 2  
   
PAF Currently in ST, Paced Hold digoxin - level 2.6 today - do not resume until < 1 Repeat TFTs WNL 
  
Hypokalemia Klor Con to 60 mEq TID PO  
  
Hx of gout Uric acid WNL 
  
Suspected aspiration pneumonia RUL consolidation Suspect aspiration related to over sedation Limit sedatives Sputum culture Cefepime completed 12/2 
  
Urinary retention, c/b serratia UTI and hematuria Appreciate Urology consult Cystoscopy performed 11/13 shows catheter induced cystitis Repeat UA shows resolution of UTI  
  
Malnutrition Appreciate Nutritionist consult Repeat prealbumin Continue Marinol 5 mg PO BID 
6 small meals daily Continue mirtazapine - watch Na+ 
  
COPD Appreciate Pulmonology assistance Continue nebs PRN   
PFTs complete 
  
Anemia Multifactorial, due to hemolysis + epistaxis + hematuria Intolerant of octreotide or doxycycline for AVM ppx due to prolonged QTc Transfuse for Hgb goal > 8 
  
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  
  
Hx of sternal wound infection, sternectomy Sternum closed post op- monitor  
  
Vocal cord paralysis Improved Speech therapy following - MBS later today  
  
Debility Continue PT/OT  
  
Ppx Protonix PT/OT Post-op abx complete Bowel regimen Cont Mechanical soft PICC placed 
  
Dispo: 
Likely will need IP rehab RECENT EVENTS:  
Thoracentesis with 1.2L of volume removal 
INR therapeutic MAPs stable Remains dyspneic LVAD INTERROGATION: 
Device interrogated in person Device function normal, normal flow, no events LVAD Pump Speed (RPM): 5400 Pump Flow (LPM): 4.4 MAP: 72 
PI (Pulsitility Index): 3.3 Power: 3.9  Test: No 
Back Up  at Bedside & Labeled: Yes Power Module Test: No 
Driveline Site Care:  No 
 Driveline Dressing: Clean, Dry, and Intact Outpatient: No 
MAP in Therapeutic Range (Outpatient): Yes Testing Alarms Reviewed: Yes 
Back up SC speed: 5400 Back up Low Speed Limit: 5000 Emergency Equipment with Patient?: Yes Emergency procedures reviewed?: Yes Drive line site inspected?: No 
Drive line intergrity inspected?: Yes Drive line dressing changed?: No 
 
PHYSICAL EXAM: 
Visit Vitals BP (!) 74/56 Pulse 72 Temp 97.4 °F (36.3 °C) Resp 20 Ht 6' 2\" (1.88 m) Wt 175 lb 7.8 oz (79.6 kg) SpO2 92% BMI 22.53 kg/m² General: Patient is well developed, well-nourished in no acute distress HEENT: Normocephalic and atraumatic. No scleral icterus. Pupils are equal, round and reactive to light and accomodation. No conjunctival injection. Oropharynx is clear. Neck: Supple. No evidence of thyroid enlargements or lymphadenopathy. JVD: Cannot be appreciated Lungs: Breath sounds are equal and clear bilaterally. No wheezes, rhonchi, or rales. Heart: Regular rate and rhythm with normal S1 and S2. No murmurs, gallops or rubs. Abdomen: Soft, no mass or tenderness. No organomegaly or hernia. Bowel sounds present. Genitourinary and rectal: deferred Extremities: No cyanosis, clubbing, or edema. Neurologic: No focal sensory or motor deficits are noted. Grossly intact. Psychiatric: Awake, alert an doriented x 3. Appropriate mood and affect. Skin: Warm, dry and well perfused. No lesions, nodules or rashes are noted. REVIEW OF SYSTEMS: 
General: Denies fever, night sweats. Ear, nose and throat: Denies difficulty hearing, sinus problems, runny nose, post-nasal drip, ringing in ears, mouth sores, loose teeth, ear pain, nosebleeds, sore throate, facial pain or numbess Cardiovascular: see above in the interval history Respiratory: Reports dyspnea Gastrointestinal: Denies heartburn, constipation, intolerance to certain foods, diarrhea, abdominal pain, nausea, vomiting, difficulty swallowing, blood in stool Kidney and bladder: Denies painful urination, frequent urination, urgency, prostate problems and impotence Musculoskeletal: Denies joint pain, muscle weakness Skin and hair: Denies change in existing skin lesions, hair loss or increase, breast changes PAST MEDICAL HISTORY: 
Past Medical History:  
Diagnosis Date  Degenerative disc disease, lumbar  Heart failure ([facility] [address]  High cholesterol  Hypertension  Paroxysmal atrial fibrillation ([facility] [address] 4/2/2019  Spinal stenosis PAST SURGICAL HISTORY: 
Past Surgical History:  
Procedure Laterality Date  COLONOSCOPY Left 11/11/2019 COLONOSCOPY at bedside performed by Alexandra Singletary MD at [address]  HX CORONARY ARTERY BYPASS GRAFT    
 triple  HX HERNIA REPAIR    
 HX IMPLANTABLE CARDIOVERTER DEFIBRILLATOR  OR CARDIOVERSION ELECTIVE ARRHYTHMIA EXTERNAL N/A 6/10/2019 EP CARDIOVERSION performed by Rober Yadav MD [facility] [facility] CATH LAB  OR CARDIOVERSION ELECTIVE ARRHYTHMIA EXTERNAL N/A 6/18/2019 EP CARDIOVERSION performed by Christy Duffy MD at [facility] [address] CATH LAB  OR INSJ/RPLCMT PERM DFB W/TRNSVNS LDS 1/DUAL CHMBR N/A 6/21/2019 INSERT ICD BIV MULTI performed by Refugio Tapia MD at [facility] [address] CATH LAB  OR TCAT IMPL WRLS P-ART PRS SNR L-T HEMODYN MNTR N/A 9/18/2019 IMPLANT HEART FAILURE MONITORING DEVICE performed by John Glover MD at [facility] [address] CATH LAB FAMILY HISTORY: 
Family History Problem Relation Age of Onset  Lung Disease Mother  Hypertension Mother [facility] Arthritis-osteo Mother  Heart Disease Mother  Heart Disease Father  Diabetes Father SOCIAL HISTORY: 
Social History Socioeconomic History  Marital status:  Spouse name: Not on file  Number of children: Not on file  Years of education: Not on file  Highest education level: Not on file Tobacco Use  Smoking status: Former Smoker Last attempt to quit: 2010 Years since quittin.0  Smokeless tobacco: Never Used Substance and Sexual Activity  Alcohol use: Not Currently Comment: rarely  Drug use: Never Other Topics Concern LABORATORY RESULTS: 
  
Labs Latest Ref Rng & Units 12/3/2019 2019 2019 2019 2019 2019 2019 WBC 4.1 - 11.1 K/uL 5.7 5.8 7.4 7.3 7.9 7.1 7.0  
RBC 4.10 - 5.70 M/uL 2.84(L) 2.63(L) 2.43(L) 2.82(L) 2.69(L) 2.83(L) 2.91(L) Hemoglobin 12.1 - 17.0 g/dL 8.4(L) 8.0(L) 7. 3(L) 8.4(L) 8. 1(L) 8.5(L) 8. 9(L) Hematocrit 36.6 - 50.3 % 28. 3(L) 26. 2(L) 24. 0(L) 27. 9(L) 26. 7(L) 27. 9(L) 28. 6(L) MCV 80.0 - 99.0 FL 99. 6(H) 99. 6(H) 98.8 98.9 99. 3(H) 98.6 98.3 Platelets 755 - 952 K/uL 239 241 265 244 219 216 222 Lymphocytes 12 - 49 % - - - - - - - Monocytes 5 - 13 % - - - - - - - Eosinophils 0 - 7 % - - - - - - - Basophils 0 - 1 % - - - - - - - Albumin 3.5 - 5.0 g/dL 2. 4(L) 2. 6(L) 2. 6(L) 2. 4(L) 2. 2(L) 2. 2(L) 2. 2(L) Calcium 8.5 - 10.1 MG/DL 9.1 9.0 8.8 8.7 8.6 8.4(L) 8. 3(L) SGOT 15 - 37 U/L 17 15 14(L) 15 12(L) 13(L) 10(L) Glucose 65 - 100 mg/dL 90 91 92 118(H) 141(H) 135(H) 95 BUN 6 - 20 MG/DL 21(H) 21(H) 20 19 17 16 20 Creatinine 0.70 - 1.30 MG/DL 1.78(H) 1.70(H) 1.65(H) 1.55(H) 1.33(H) 1.12 1.08 Sodium 136 - 145 mmol/L 135(L) 135(L) 133(L) 134(L) 131(L) 132(L) 134(L) Potassium 3.5 - 5.1 mmol/L 4.9 5.1 4.5 4.1 4.2 4.1 4.1 TSH 0.36 - 3.74 uIU/mL 2.30 - - - - - -  
PSA 0.01 - 4.0 ng/mL - - - - - - -  
LDH 85 - 241 U/L 348(H) 271(H) 271(H) 256(H) 246(H) 218 230 CEA ng/mL - - - - - - - Some recent data might be hidden Lab Results Component Value Date/Time TSH 2.30 2019 03:29 AM  
 TSH 2.40 10/25/2019 07:39 PM  
 TSH 2.45 2019 04:16 AM  
 
 
ALLERGY: 
No Known Allergies CURRENT MEDICATIONS: 
 
Current Facility-Administered Medications:  
  albuterol-ipratropium (DUO-NEB) 2.5 MG-0.5 MG/3 ML, 3 mL, Nebulization, Q6H RT, Polliard, Jenni Spurling, NP, Stopped at 12/03/19 1400   [START ON 12/4/2019] magnesium oxide (MAG-OX) tablet 400 mg, 400 mg, Oral, DAILY, Logan Kincaid MD 
  [START ON 12/4/2019] potassium chloride SR (KLOR-CON 10) tablet 40 mEq, 40 mEq, Oral, DAILY, Logan Kincaid MD 
  artificial saliva (MOUTH KOTE) 1 Spray, 1 Spray, Oral, PRN, Polliard, Jenni Spurling, NP 
  [Held by provider] bumetanide (BUMEX) injection 1 mg, 1 mg, IntraVENous, BID, Polliard, Jenni Spurling, NP, 1 mg at 12/03/19 0843 
  lactobac ac& pc-s.therm-b.anim (TIAN Q/RISAQUAD), 1 Cap, Oral, DAILY, Cara Massey MD, 1 Cap at 12/03/19 7002   dronabinol (MARINOL) capsule 5 mg, 5 mg, Oral, ACB&D, Katelynnd, Cordell Sohan LOPEZ NP, 5 mg at 12/03/19 2734   albumin human 25% (BUMINATE) solution 12.5 g, 12.5 g, IntraVENous, BID, Cordell Herrera NP, 12.5 g at 12/03/19 0088   oxyCODONE IR (ROXICODONE) tablet 5 mg, 5 mg, Oral, Q6H PRN, Suzanna Davis MD, 5 mg at 12/03/19 9067   mirtazapine (REMERON) tablet 15 mg, 15 mg, Oral, QHS, Alexandria Montez NP, 15 mg at 12/02/19 2142   balsam peru-castor oil (VENELEX) ointment, , Topical, TID, Mera Andrews MD 
  [facility]) capsule 0.4 mg, 0.4 mg, Oral, DAILY, Logan Kincaid MD, 0.4 mg at 12/03/19 3021   aspirin chewable tablet 81 mg, 81 mg, Oral, DAILY, Shana Sims NP, 81 mg at 12/03/19 8416   sildenafil (antihypertensive) (REVATIO) tablet 20 mg, 20 mg, Oral, TID, Levi, Shana B, NP, 20 mg at 12/03/19 1222   pantoprazole (PROTONIX) tablet 40 mg, 40 mg, Oral, ACB, Levi, Shana B, NP, 40 mg at 12/03/19 7425   insulin lispro (HUMALOG) injection, , SubCUTAneous, AC&HS, Levi, Shana B, NP, Stopped at 12/02/19 1630   Warfarin MD/NP dosing, , Other, PRN, Mounika Altman MD 
  epoetin yvonne-epbx (RETACRIT) injection 20,000 Units, 20,000 Units, SubCUTAneous, Q7D, Clementina Tillman MD, 20,000 Units at 12/03/19 7454   lidocaine (LIDODERM) 5 % patch 1 Patch, 1 Patch, TransDERmal, Q24H, Shana Sims, NP, 1 Patch at 12/02/19 2150   sodium chloride (NS) flush 5-40 mL, 5-40 mL, IntraVENous, Q8H, Padmaja Reina MD, 10 mL at 12/03/19 0136   sodium chloride (NS) flush 5-40 mL, 5-40 mL, IntraVENous, PRN, Padmaja Reina MD 
  acetaminophen (TYLENOL) tablet 650 mg, 650 mg, Oral, Q6H PRN, Padmaja Reina MD, 650 mg at 12/03/19 0857 
  naloxone [facility]) injection 0.4 mg, 0.4 mg, IntraVENous, PRN, Padmaja Reina MD 
  ondansetron [facility]) injection 4 mg, 4 mg, IntraVENous, Q4H PRN, Padmaja Reina MD, 4 mg at 11/20/19 2000   senna-docusate (PERICOLACE) 8.6-50 mg per tablet 1 Tab, 1 Tab, Oral, DAILY, Padmaja Reina MD, 1 Tab at 12/03/19 2690   polyethylene glycol (MIRALAX) packet 17 g, 17 g, Oral, DAILY, Padmaja Reina MD, 17 g at 12/03/19 5116   bisacodyl (DULCOLAX) tablet 5 mg, 5 mg, Oral, DAILY PRN, Padmaja Reina MD 
  sucralfate (CARAFATE) tablet 1 g, 1 g, Oral, AC&HS, Padmaja Reina MD, 1 g at 12/03/19 1253   influenza vaccine 2019-20 (6 mos+)(PF) (FLUARIX/FLULAVAL/FLUZONE QUAD) injection 0.5 mL, 0.5 mL, IntraMUSCular, PRIOR TO DISCHARGE, Mounika Altman MD 
  hydrALAZINE (APRESOLINE) 20 mg/mL injection 20 mg, 20 mg, IntraVENous, Q6H PRN, Shana Sims, NP, 20 mg at 11/19/19 0547 
  dextrose 10 % infusion 125-250 mL, 125-250 mL, IntraVENous, PRN, Maryann Thomas MD, Stopped at 11/18/19 0700 Thank you for allowing me to participate in this patient's care. [place] Edwards, AG[facility]ency Hospital [facility] Minneapolisjordin Rueda [address] [facility] [address], [address] Phone: [phone] Fax: [phone] [facility] ATTENDING ADDENDUM Patient was seen and examined in person. Data and notes were reviewed. I have discussed and agree with the plan as noted in the NP note above without further additions. Melissa Hilliard MD PhD 
Calos Rueda [address] [place] [address]

## 2019-12-03 NOTE — PROGRESS NOTES
Chart reviewed and attempted to see patient for OT intervention. Per RN, patient MIRNA for a test at this time. Will follow up for OT intervention as able. Thank you.

## 2019-12-03 NOTE — PROGRESS NOTES
Problem: Dysphagia (Adult) Goal: *Acute Goals and Plan of Care (Insert Text) Description Speech Pathology Re-evaluation 12/3/2019 1. Patient will tolerate least restrictive diet without adverse effects within 7 days. Re-evaluation 11/26/2019 1. Patient will tolerate regular/thin liquid diet with no overt s/s aspiration within 7 days Re-evaluation 11/20/2019 1. Patient will tolerate mechanical soft/thin liquid diet with no overt s/s aspiration within 7 days. MET Initiated 10/28/19 1. Patient will tolerate regular diet/thin liquids without overt s/s of aspiration within 7 days MET 2. Patient will participate in [facility] for further objective assessment of swallow physiology within 7 days (once medically stable from Impella/cardiac sx standpoint) NOT MET; discontinue Outcome: Progressing Towards Goal 
  
 
SPEECH PATHOLOGY MODIFIED BARIUM SWALLOW STUDY Patient: Natasha Flanagan (75 y.o. male) Date: 12/3/2019 Primary Diagnosis: Acute decompensated heart failure ([facility] [address] [I50.9] Procedure(s) (LRB): LEFT BRACHIAL THROMBECTOMY (Left) 8 Days Post-Op Precautions: Fall, Sternal 
 
ASSESSMENT : 
Based on the objective data described below, the patient presents with mild oral and moderate pharyngeal dysphagia. Oral dysphagia characterized by piecemeal deglutition and premature spillage of liquid consistencies. Pharyngeal dysphagia characterized by delayed swallow initiation resulting in min penetration before the swallow with nectar-thick liquids and min-mod amount of aspiration before the swallow with thin liquid consistency. Patient is sensate to aspiration, but was unable to clear airway of aspirated contents. Penetration/aspiration was alleviated when presented both thin and nectar-thick liquids in a small medicine cup, as pt was taking very large sips despite being cued to take small sips.  Pt also with slightly reduced hyolaryngeal excursion/epiglottic retroflexion, however, this did not appear to adversely impact swallow safety, as no significant pharyngeal residue appreciated. At this juncture, further recommendations for diet will have to come from a discussion between the medical team and patient's family. Patient has been tolerating regular diet/thin liquids for a while without any adverse effects (no signs of pneumonia and no needs for supplemental oxygen), however, patient has also been on a fluid restriction. Pt is certainly at risk to develop complications as a result of his aspiration given the amount of aspiration present (not a micro amount) and the ineffectiveness of his cough. Therefore, will present two options for diet initiation and will await discussion between medical team and family to decide which recommendations they'd like to follow. Attempted to discuss with NP but was unable to reach her. Message left with advanced heart failure team. 
 
Patient will benefit from skilled intervention to address the above impairments. Patients rehabilitation potential is considered to be Fair Factors which may influence rehabilitation potential include:  
[]              None noted []              Mental ability/status [x]              Medical condition []              Home/family situation and support systems []              Safety awareness []              Pain tolerance/management 
[]              Other: PLAN : 
Recommendations and Planned Interventions: 
--if patient and family wish to pursue safest diet: regular diet/nectar-thick liquids. No straws. Supervision to ensure small sips/bites. --if patient and family wish to pursue more lenient diet while accepting risks of aspiration: continue regular diet and thin liquids. No straws and supervision to ensure small sips/bites. --patient would benefit from safety cup for dysphagia --patient will also benefit from ENT to address vocal fold paralysis 
--SLP will continue to follow. Frequency/Duration: Patient will be followed by speech-language pathology 3 times a week to address goals. Discharge Recommendations: To Be Determined SUBJECTIVE:  
Patient stated, \"I like bluegrass. \" OBJECTIVE:  
 
Past Medical History:  
Diagnosis Date  Degenerative disc disease, lumbar  Heart failure ([facility] [address]  High cholesterol  Hypertension  Paroxysmal atrial fibrillation ([facility] [address] 4/2/2019  Spinal stenosis Past Surgical History:  
Procedure Laterality Date  COLONOSCOPY Left 11/11/2019 COLONOSCOPY at bedside performed by Alexandra Singletary MD at [address]  HX CORONARY ARTERY BYPASS GRAFT    
 triple  HX HERNIA REPAIR    
 HX IMPLANTABLE CARDIOVERTER DEFIBRILLATOR  MT CARDIOVERSION ELECTIVE ARRHYTHMIA EXTERNAL N/A 6/10/2019 EP CARDIOVERSION performed by Rober Yadav MD at [place] [address] CATH LAB  MT CARDIOVERSION ELECTIVE ARRHYTHMIA EXTERNAL N/A 6/18/2019 EP CARDIOVERSION performed by Christy Duffy MD at [place] [address], [address] CATH LAB  MT INSJ/RPLCMT PERM DFB W/TRNSVNS LDS 1/DUAL CHMBR N/A 6/21/2019 INSERT ICD BIV MULTI performed by Refugio Tapia MD at [place] [address], [address] CATH LAB  MT TCAT IMPL WRLS P-ART PRS SNR L-T HEMODYN MNTR N/A 9/18/2019 IMPLANT HEART FAILURE MONITORING DEVICE performed by John Glover MD at [place] [address], [address] CATH LAB Prior Level of Function/Home Situation:  
Home Situation Home Environment: Private residence # Steps to Enter: 0 One/Two Story Residence: One story Living Alone: No 
Support Systems: Spouse/Significant Other/Partner Patient Expects to be Discharged to[de-identified] Unknown Current DME Used/Available at Home: Cane, straight, Walker, rolling, CPAP Tub or Shower Type: Shower Diet prior to admission: Regular diet/thin liquids Current Diet:  Regular diet/thin liquids Radiologist: Dr. Warden Luna Film Views: Lateral 
Patient Position: upright in Ha[place] chair Trial 1: Trial 2:  
 Consistency Presented: Thin liquid Consistency Presented: Nectar thick liquid How Presented: Self-fed/presented;Cup/gulp How Presented: Self-fed/presented;Cup/gulp;Cup/sip Consistency Amount: (50 cc) Consistency Amount: (50 cc nectar-thick Ba) Bolus Acceptance: No impairment Bolus Acceptance: No impairment Bolus Formation/Control: Impaired: Premature spillage;Piecemeal Bolus Formation/Control: Impaired: Premature spillage Propulsion: Delayed (# of seconds) Propulsion: Delayed (# of seconds) Oral Residue: None Oral Residue: None Initiation of Swallow: Triggered at pyriform sinus(es)(already in laryngeal vestibule prior to swallow initiation) Initiation of Swallow: Triggered at pyriform sinus(es) Timing: Pooling 1-5 sec;Pyriform sinus Timing: Pyriform sinus Penetration: To cords; Before swallow Penetration: Before swallow Aspiration/Timing: Before;During Aspiration/Timing: No evidence of aspiration Pharyngeal Clearance: No residue Pharyngeal Clearance: No residue Attempted Modifications: Small sips and bites Attempted Modifications: Small sips and bites Effective Modifications: Small sips and bites Effective Modifications: (small sips) Trial 3:  
Consistency Presented: Puree; Solid How Presented: Self-fed/presented;Spoon Consistency Amount: (2 tsp Ba paste) Bolus Acceptance: No impairment Bolus Formation/Control: No impairment, issues, or problems :    
Propulsion: No impairment Oral Residue: None Initiation of Swallow: Triggered at valleculae Timing: No impairment Penetration: None Aspiration/Timing: No evidence of aspiration Pharyngeal Clearance: No residue Decreased Tongue Base Retraction?: No 
Laryngeal Elevation: Reduced excursion with laryngeal vestibule gap Aspiration/Penetration Score: 7 (Aspiration-Contrast passes below the cords/, but is not ejected despite attempt) Pharyngeal Symmetry: Not assessed Pharyngeal-Esophageal Segment: No impairment Pharyngeal Dysfunction: Decreased strength;Decreased elevation/closure Oral Phase Severity: Mild-moderate Pharyngeal Phase Severity: Moderate Dysphagia Treatment: 
Patient seen immediately following MBS for education. Reviewed images with patient. Discussed normal anatomy/physiology of swallowing mechanism. Discussed risks of aspiration and spoke about how a decision would need to be made with his family and MD.  Patient expressed understanding, however, will require reinforcement given cognitive impairments. NOMS:  
The NOMS functional outcome measure was used to quantify this patient's level of swallowing impairment. Based on the NOMS, the patient was determined to be at level 4 for swallow function NOMS Swallowing Levels: 
Level 1 (CN): NPO Level 2 (CM): NPO but takes consistency in therapy Level 3 (CL): Takes less than 50% of nutrition p.o. and continues with nonoral feedings; and/or safe with mod cues; and/or max diet restriction Level 4 (CK): Safe swallow but needs mod cues; and/or mod diet restriction; and/or still requires some nonoral feeding/supplements Level 5 (CJ): Safe swallow with min diet restriction; and/or needs min cues Level 6 (CI): Independent with p.o.; rare cues; usually self cues; may need to avoid some foods or needs extra time Level 7 (CH): Independent for all p.o. ECHO. (2003). National Outcomes Measurement System (NOMS): Adult Speech-Language Pathology User's Guide. COMMUNICATION/EDUCATION:  
Patient was educated regarding His deficit(s) of dysphagia as this relates to His diagnosis of vocal fold paralysis. He demonstrated Fair understanding but will require reinforcement. The patients plan of care including findings from [address], recommendations, planned interventions, and recommended diet changes were discussed with: Registered Nurse. Patient is unable to participate in goal setting and plan of care. Thank you for this referral. 
Jennifer Castillo, SLP Time Calculation: 34 mins

## 2019-12-04 NOTE — PROGRESS NOTES
0745: Bedside and Verbal shift change report given to Craig Grajeda RN (oncoming nurse) by Lamond Krabbe., RN (offgoing nurse). Report included the following information SBAR, OR Summary, Procedure Summary, Intake/Output, MAR, Recent Results, Cardiac Rhythm Paced and Alarm Parameters . 0845: Orders to hold oral meds per HF team due to pt's lethargy 
 
0900: Pt to use CPAP per HF team while resting 
 
0905: Echo tech at bedside 
 
[id number]: HF team at bedside, new orders received, stop bumex gtt 
 
1000: Unit secretary called otolaryngology consult, unable to reach anyone at office, message left 1055: Pt remains on home CPAP, RN unable to obtain svO2 for almost 1 hr despite multiple attempts using multiple pulse oximetry devices. RT paged to obtain ABG, svO2 98% 1230: Mary Lou Silver, NP at bedside, orders to restart Bumex gtt 1240: Dr. Ailene Severance at bedside, orders to start milrinone gtt 
 
1300: Pt requesting to take break from CPAP mask, pt more awake and alert, placed on 2L NC 
 
1550: No response from message left earlier in day for consult. Otolaryngology consult called again. Office  states she will page MD right away. 1745: Driveline dressing changed 1940: Bedside and Verbal shift change report given to Maria C Beaver RN (oncoming nurse) by Craig Grajeda RN (offgoing nurse). Report included the following information SBAR, ED Summary, OR Summary, Procedure Summary, Intake/Output, MAR, Recent Results, Cardiac Rhythm Paced and Alarm Parameters .

## 2019-12-04 NOTE — PROGRESS NOTES
Pulmonary / Critical Care Progress Note Name: Wicho Link  MRN: [id number]  Date: 2019 Impression / Plan: Hypoxemic respiratory failure with dyspnea 2/2 to continued pulm edema and presumed transudative effusion - also at risk for aspiration - preop (LVAD spirometry with combined obstructive and restrictive physiology Systolic CHF with EF 15% s/p LVAD 19 Mediastinal adenopathy with vocal cord paralysis 
 
parox afib JOSE on cpap 
 
anemia 
 
--agree with pulmicort Rubens Taveras 
--diuresis / bumex gtt per nephrology 
--NIPPV as needed History:          
70 yo with systolic chf following LVAD Having continued sob and pulm edema Underwent thoracentesis a couple of days ago (therapeutic) 1.2 L fluid from pleural space Continues to have pulm edema and dyspnea and was moved to cvicu He has been given additional diuretic and starting on a Bumex drip by nephrology MBS - shows aspiration risk with thin liquids Vital Signs:      
Patient Vitals for the past 4 hrs: 
 Temp Pulse Resp SpO2 Weight 19 1000  83 24 95 %   
19 0900  78 19 95 %   
19 0800 97.4 °F (36.3 °C) 74 15 93 %   
19 0724     87 kg (191 lb 12.8 oz) 19 0700  71 24   Temp (24hrs), Av.9 °F (36.6 °C), Min:97.4 °F (36.3 °C), Max:98.4 °F (36.9 °C) CVP:     CVP (mmHg): 3 mmHg (19 0800) Intake/Output Summary (Last 24 hours) at 2019 1006 Last data filed at 2019 1000 Gross per 24 hour Intake 340 ml Output 3925 ml Net -3585 ml Blood Sugar: 
Lab Results Component Value Date/Time Glucose (POC) 96 2019 07:30 AM  
 Glucose (POC) 96 2019 09:43 PM  
 Glucose (POC) 99 2019 04:42 PM  
 Glucose (POC) 121 (H) 2019 11:17 AM  
 Glucose (POC) 100 2019 07:04 AM  
 
 
Physical Exam:     
NC O2 Somewhat confused NC AT 
MMM No accessory use Diminished bs at bases RRR Soft Warm and dry Trace edema Data Review:     
Radiology images independently viewed CXR - pulm edema with increased R effusion following thoracentesis MBS with aspiration of thin liquids Labs: 
Lab: 
Recent Labs 12/04/19 
0315 12/03/19 
0327 12/02/19 
1140 12/02/19 
0255 WBC 5.3 5.7  --  5.8 HGB 9.0* 8.4*  --  8.0*  
 239  --  241  135*  --  135* K 4.1 4.9  --  5.1  103  --  103 CO2 29 28  --  30  
BUN 20 21*  --  21* CREA 1.69* 1.78*  --  1.70* GLU 91 90  --  91  
CA 9.1 9.1  --  9.0 MG 2.5* 2.5*  --  2.4 INR 2.5* 2.6*  --  2.7*  
CPK  --   --  23*  --   
TBILI 0.9 0.9  --  1.0 SGOT 13* 17  --  15  
      
ABG: 
Recent Labs 12/04/19 
477 5620 12/03/19 
1720 12/02/19 
1229 PHI 7.413 7.447 7.455* PCO2I 42.3 36.0 37.0 PO2I 76* 88 76* HCO3I 27.0* 24.8 26.0 SO2I 95 97 96 Microbiology: 
Results Procedure Component Value Units Date/Time CULTURE, RESPIRATORY/SPUTUM/BRONCH Shaista Leah STAIN [[id number]]  (Abnormal) Collected:  11/27/19 1110 Order Status:  Completed Specimen:  Sputum Updated:  11/29/19 1325 Special Requests: NO SPECIAL REQUESTS     
  GRAM STAIN FEW WBCS SEEN     
   NO EPITHELIAL CELLS SEEN     
   NO DEFINITE ORGANISM SEEN Culture result:    
  HEAVY ENTEROCOCCUS SPECIES NOTED  
     
   LIGHT YEAST     
 CULTURE, RESPIRATORY/SPUTUM/BRONCH Noreene Arya [[id number]] Collected:  11/25/19 1815 Order Status:  Canceled Specimen:  Sputum URINE CULTURE HOLD SAMPLE [[id number]] Collected:  11/25/19 1505 Order Status:  Completed Specimen:  Urine from Serum Updated:  11/25/19 1647 Urine culture hold URINE ON HOLD IN MICROBIOLOGY DEPT FOR 3 DAYS. IF UNPRESERVED URINE IS SUBMITTED, IT CANNOT BE USED FOR ADDITIONAL TESTING AFTER 24 HRS, RECOLLECTION WILL BE REQUIRED.   
     
  
 
 
  
Jacob Ramsay MD

## 2019-12-04 NOTE — PROGRESS NOTES
[facility] 
 [address] [facility], [address] [place] [facility], [facility] Phone: [phone]   [phone]   
  
Nephrology Progress Note Sona Kiser     1950     [id number] Date of Admission : 10/25/2019 
12/04/19 CC:  Follow up for JUAN J, CKD, Hyponatremia Assessment and Plan JUAN J on CKD 
- remains in CHF and CXR / Pulm edema worse despite diuresis and increase in LVAD flows - IV bumex 2 mg now and started Bumex gtt at 0.5 mg/ hr  
- ordered ABG  
- will discuss plans with AHF team  
 
Hyponatremia: 
- 2/2 CHF 
- stable  
  
Gross hematuria, BPH w/ retention: 
- off CBI pre VAD. cysto pre op showed catheter related Cystitis @ postr wall  
- neal had to be replaced due to urinary retention - On flomax  
- keep neal for now until he is stable from CHF stand point Acute on Chronic HFrEF  
- EF 16-20%, NYHA Class IV , hx of VF arrest - s/p AICD 
- Impella insertion 10/29- removed 11/18  
- s/p LVAD placement on 11/18 CKD Stage III  
- Mild Left renal atrophy at baseline Hoarseness, vocal cord paralysis, mediastinal adenopathy: 
- will need eventual PET/CT 
  
L arm clot s/p thrombectomy on 11/25 JOSE on CPAP  
  
PAF s/p DCCV 6/19 
  
Anemia: 
- from blood loss s/p multiple blood products - s/p IV iron,  Repeat iron studies ok 
- on PAVEL q7 d Serratia and Enteroccocus UTI: 
- completed abx Interval History:   
Seen and examined Continued worsening of SOB Moved to CVICU overnight Quite SOB to talk even a couple of words Reports cough, clear to yellow sputum Review of Systems: Review of systems not obtained due to patient factors. Current Medications:  
Current Facility-Administered Medications Medication Dose Route Frequency  bumetanide (BUMEX) injection 2 mg  2 mg IntraVENous ONCE  
 bumetanide (BUMEX) 0.25 mg/mL infusion  0.5 mg/hr IntraVENous CONTINUOUS  
 albuterol-ipratropium (DUO-NEB) 2.5 MG-0.5 MG/3 ML  3 mL Nebulization Q6H RT  
  magnesium oxide (MAG-OX) tablet 400 mg  400 mg Oral DAILY  potassium chloride SR (KLOR-CON 10) tablet 40 mEq  40 mEq Oral DAILY  artificial saliva (MOUTH KOTE) 1 Spray  1 Spray Oral PRN  
 lactobac ac& pc-s.therm-b.anim (TIAN Q/RISAQUAD)  1 Cap Oral DAILY  albumin human 25% (BUMINATE) solution 12.5 g  12.5 g IntraVENous BID  
 oxyCODONE IR (ROXICODONE) tablet 5 mg  5 mg Oral Q6H PRN  mirtazapine (REMERON) tablet 15 mg  15 mg Oral QHS  balsam peru-castor oil (VENELEX) ointment   Topical TID  tamsulosin (FLOMAX) capsule 0.4 mg  0.4 mg Oral DAILY  aspirin chewable tablet 81 mg  81 mg Oral DAILY  sildenafil (antihypertensive) (REVATIO) tablet 20 mg  20 mg Oral TID  pantoprazole (PROTONIX) tablet 40 mg  40 mg Oral ACB  insulin lispro (HUMALOG) injection   SubCUTAneous AC&HS  Warfarin MD/NP dosing   Other PRN  
 epoetin yvonne-epbx (RETACRIT) injection 20,000 Units  20,000 Units SubCUTAneous Q7D  
 lidocaine (LIDODERM) 5 % patch 1 Patch  1 Patch TransDERmal Q24H  
 sodium chloride (NS) flush 5-40 mL  5-40 mL IntraVENous Q8H  
 sodium chloride (NS) flush 5-40 mL  5-40 mL IntraVENous PRN  
 acetaminophen (TYLENOL) tablet 650 mg  650 mg Oral Q6H PRN  
 naloxone (NARCAN) injection 0.4 mg  0.4 mg IntraVENous PRN  
 ondansetron (ZOFRAN) injection 4 mg  4 mg IntraVENous Q4H PRN  
 senna-docusate (PERICOLACE) 8.6-50 mg per tablet 1 Tab  1 Tab Oral DAILY  polyethylene glycol (MIRALAX) packet 17 g  17 g Oral DAILY  bisacodyl (DULCOLAX) tablet 5 mg  5 mg Oral DAILY PRN  
 sucralfate (CARAFATE) tablet 1 g  1 g Oral AC&HS  influenza vaccine 2019-20 (6 mos+)(PF) (FLUARIX/FLULAVAL/FLUZONE QUAD) injection 0.5 mL  0.5 mL IntraMUSCular PRIOR TO DISCHARGE  hydrALAZINE (APRESOLINE) 20 mg/mL injection 20 mg  20 mg IntraVENous Q6H PRN  
 dextrose 10 % infusion 125-250 mL  125-250 mL IntraVENous PRN No Known Allergies Objective: 
Vitals:   
Vitals: 12/03/19 2141 12/04/19 0000 12/04/19 0354 12/04/19 0400 BP:      
Pulse:  74  87 Resp:  22  18 Temp:  98.2 °F (36.8 °C)  98.4 °F (36.9 °C) SpO2: 96% 94% 94% 94% Weight:      
Height:      
 
Intake and Output: 
12/03 1901 - 12/04 0700 In: -  
Out: 7663 [KRS:[id number]] 12/02 0701 - 12/03 1900 In: 1601.9 [P.O.:1120; I.V.:481.9] Out: 1013 Amaya Davidson [[facility]:7603] Physical Examination: 
 
General: Awake and alert Neck:  Lines + Resp:  Decreased bibasilar breath sounds CV:  VADsounds 1++ b/l LE edema GI:  Soft, NT, + Bowel sounds Neurologic:  Confused :  + neal [x]    High complexity decision making was performed 
[x]    Patient is at high-risk of decompensation with multiple organ involvement Lab Data Personally Reviewed: I have reviewed all the pertinent labs, microbiology data and radiology studies during assessment. Recent Labs 12/04/19 
0315 12/03/19 
0327 12/02/19 
0255  135* 135* K 4.1 4.9 5.1  103 103 CO2 29 28 30 GLU 91 90 91 BUN 20 21* 21* CREA 1.69* 1.78* 1.70* CA 9.1 9.1 9.0 MG 2.5* 2.5* 2.4 ALB 2.7* 2.4* 2.6* SGOT 13* 17 15 ALT 9* 8* 7* INR 2.5* 2.6* 2.7* Recent Labs 12/04/19 
0315 12/03/19 
0327 12/02/19 
0255 WBC 5.3 5.7 5.8 HGB 9.0* 8.4* 8.0*  
HCT 29.6* 28.3* 26.2*  
 239 241 No results found for: SDES Lab Results Component Value Date/Time Culture result: HEAVY ENTEROCOCCUS SPECIES NOTED (A) 11/27/2019 11:10 AM  
 Culture result: LIGHT YEAST (A) 11/27/2019 11:10 AM  
 Culture result: NO GROWTH 5 DAYS 11/12/2019 11:18 AM  
 Culture result: SERRATIA MARCESCENS (A) 10/31/2019 10:05 AM  
 Culture result: SERRATIA MARCESCENS (2ND COLONY TYPE/STRAIN) (A) 10/31/2019 10:05 AM  
 Culture result: ENTEROCOCCUS FAECALIS GROUP D (A) 10/31/2019 10:05 AM  
 
Recent Results (from the past 24 hour(s)) GLUCOSE, POC Collection Time: 12/03/19  7:04 AM  
Result Value Ref Range Glucose (POC) 100 65 - 100 mg/dL Performed by Bright Wick GLUCOSE, POC Collection Time: 12/03/19 11:17 AM  
Result Value Ref Range Glucose (POC) 121 (H) 65 - 100 mg/dL Performed by Joshua Bowman GLUCOSE, POC Collection Time: 12/03/19  4:42 PM  
Result Value Ref Range Glucose (POC) 99 65 - 100 mg/dL Performed by Filipe Marquez POC G3 - PUL Collection Time: 12/03/19  5:20 PM  
Result Value Ref Range pH (POC) 7.447 7.35 - 7.45    
 pCO2 (POC) 36.0 35.0 - 45.0 MMHG  
 pO2 (POC) 88 80 - 100 MMHG  
 HCO3 (POC) 24.8 22 - 26 MMOL/L  
 sO2 (POC) 97 92 - 97 % Base excess (POC) 1 mmol/L Site RIGHT RADIAL Device: NASAL CANNULA Flow rate (POC) 2 L/M Allens test (POC) N/A Specimen type (POC) ARTERIAL    
ECHO ADULT FOLLOW-UP OR LIMITED Collection Time: 12/03/19  5:22 PM  
Result Value Ref Range Tapse 1.26 (A) 1.5 - 2.0 cm LVIDd 7.37 (A) 4.2 - 5.9 cm LVIDs 7.23 cm RVIDd 4.43 cm Triscuspid Valve Regurgitation Peak Gradient 24.6 mmHg  
 TR Max Velocity 248.19 cm/s Left Ventricular Fractional Shortening by 2D [id number] % GLUCOSE, POC Collection Time: 12/03/19  9:43 PM  
Result Value Ref Range Glucose (POC) 96 65 - 100 mg/dL Performed by R[facility] METABOLIC PANEL, COMPREHENSIVE Collection Time: 12/04/19  3:15 AM  
Result Value Ref Range Sodium 138 136 - 145 mmol/L Potassium 4.1 3.5 - 5.1 mmol/L Chloride 102 97 - 108 mmol/L  
 CO2 29 21 - 32 mmol/L Anion gap 7 5 - 15 mmol/L Glucose 91 65 - 100 mg/dL BUN 20 6 - 20 MG/DL Creatinine 1.69 (H) 0.70 - 1.30 MG/DL  
 BUN/Creatinine ratio 12 12 - 20 GFR est AA 49 (L) >60 ml/min/1.73m2 GFR est non-AA 40 (L) >60 ml/min/1.73m2 Calcium 9.1 8.5 - 10.1 MG/DL Bilirubin, total 0.9 0.2 - 1.0 MG/DL  
 ALT (SGPT) 9 (L) 12 - 78 U/L  
 AST (SGOT) 13 (L) 15 - 37 U/L Alk. phosphatase 115 45 - 117 U/L Protein, total 6.4 6.4 - 8.2 g/dL Albumin 2.7 (L) 3.5 - 5.0 g/dL Globulin 3.7 2.0 - 4.0 g/dL A-G Ratio 0.7 (L) 1.1 - 2.2 MAGNESIUM Collection Time: 12/04/19  3:15 AM  
Result Value Ref Range Magnesium 2.5 (H) 1.6 - 2.4 mg/dL CBC W/O DIFF Collection Time: 12/04/19  3:15 AM  
Result Value Ref Range WBC 5.3 4.1 - 11.1 K/uL  
 RBC 3.00 (L) 4.10 - 5.70 M/uL HGB 9.0 (L) 12.1 - 17.0 g/dL HCT 29.6 (L) 36.6 - 50.3 % MCV 98.7 80.0 - 99.0 FL  
 MCH 30.0 26.0 - 34.0 PG  
 MCHC 30.4 30.0 - 36.5 g/dL  
 RDW 17.8 (H) 11.5 - 14.5 % PLATELET 384 776 - 130 K/uL MPV 9.3 8.9 - 12.9 FL  
 NRBC 0.0 0  WBC ABSOLUTE NRBC 0.00 0.00 - 0.01 K/uL PROTHROMBIN TIME + INR Collection Time: 12/04/19  3:15 AM  
Result Value Ref Range INR 2.5 (H) 0.9 - 1.1 Prothrombin time 24.1 (H) 9.0 - 11.1 sec PTT Collection Time: 12/04/19  3:15 AM  
Result Value Ref Range aPTT 37.8 (H) 22.1 - 32.0 sec  
 aPTT, therapeutic range     58.0 - 77.0 SECS  
DIGOXIN Collection Time: 12/04/19  3:15 AM  
Result Value Ref Range Digoxin level 1.9 0.90 - 2.00 NG/ML  
LD Collection Time: 12/04/19  3:15 AM  
Result Value Ref Range  (H) 85 - 241 U/L  
NT-PRO BNP Collection Time: 12/04/19  3:15 AM  
Result Value Ref Range NT pro-BNP 9,848 (H) <125 PG/ML Devon Antunez MD

## 2019-12-04 NOTE — PROGRESS NOTES
SLP Contact Note Attempted to see patient for swallowing treatment. Pt has now transferred to CVICU and is on CPAP. Therefore, not appropriate for PO trials at this time. SLP will continue to follow. Thank you, Tolu Reese M.Ed, CCC-SLP Speech-Language Pathologist

## 2019-12-04 NOTE — PROGRESS NOTES
[facility] VAD/Heart Failure Progress Note Admit Date: 10/25/2019 POD:  9 Days Post-Op Procedure:  Procedure(s): LEFT BRACHIAL THROMBECTOMY Subjective:  
Confusion better; mild dyspnea, fatigue, and weakness; worried about vocal cords Objective:  
Vitals: 
Blood pressure (!) 74/56, pulse 81, temperature 97.4 °F (36.3 °C), resp. rate 19, height 6' 2\" (1.88 m), weight 191 lb 12.8 oz (87 kg), SpO2 98 %. Temp (24hrs), Av.8 °F (36.6 °C), Min:97.4 °F (36.3 °C), Max:98.4 °F (36.9 °C) Hemodynamics: 
 CO: CO (l/min): 5.4 l/min CI: CI (l/min/m2): 2.5 l/min/m2 CVP: CVP (mmHg): 4 mmHg (19 1100) SVR: SVR (dyne*sec)/cm5: 1067 (dyne*sec)/cm5 (19 1200) PAP Systolic: PAP Systolic: 50 (96/32/74 7051) PAP Diastolic: PAP Diastolic: 30 (67/92/70 7502) PVR:   
 SV02: SVO2 (%): 45 % (19 1200) SCV02: SCVO2 (%): 75 % (10/29/19 1900) VAD Interrogation: LVAD (Heartmate) Pump Speed (RPM): 1522 Pump Flow (LPM): 4.6 PI (Pulsitility Index): 2.2 Power: 4.3 MAP: 88  Test: Yes 
Back Up  at Bedside & Labeled: Yes Power Module Test: No 
Driveline Site Care: No 
Driveline Dressing: Clean, Dry, and Intact EKG/Rhythm:   
 
Extubation Date / Time:  
 
CT Output:  
 
Ventilator: 
Ventilator Volumes Vt Set (ml): 550 ml (19 08) Vt Exhaled (Machine Breath) (ml): 639 ml (19 08) Vt Spont (ml): 437 ml (19 1035) Ve Observed (l/min): 7.25 l/min (19 1035) Oxygen Therapy: 
Oxygen Therapy O2 Sat (%): 98 %(sat from ABG. Unable to obtain sat by Sp02 monitor ) (19) Pulse via Oximetry: 76 beats per minute (19) O2 Device: CPAP mask (19) O2 Flow Rate (L/min): 2 l/min (19 111) FIO2 (%): 50 % (19 103) CXR: 
 
Admission Weight: Last Weight Weight: 192 lb 10.9 oz (87.4 kg) Weight: 191 lb 12.8 oz (87 kg) Intake / Rosslyn Florentin / Drain: Current Shift: 12/04 0701 - 12/04 1900 In: 1 [I.V.:1] 
Out: 2100 [Urine:2100] Last 24 hrs.:  
 
Intake/Output Summary (Last 24 hours) at 12/4/2019 1222 Last data filed at 12/4/2019 1100 Gross per 24 hour Intake 100.97 ml Output 4000 ml Net -3899.03 ml Recent Labs 12/04/19 
0933 12/02/19 
1140 CPK 23* 23* CKMB 2.0  --   
TROIQ 0.08*  --   
 
Recent Labs 12/04/19 
0315 12/03/19 
0327 12/02/19 
0255  135* 135* K 4.1 4.9 5.1 CO2 29 28 30 BUN 20 21* 21* CREA 1.69* 1.78* 1.70* GLU 91 90 91 MG 2.5* 2.5* 2.4 WBC 5.3 5.7 5.8 HGB 9.0* 8.4* 8.0*  
HCT 29.6* 28.3* 26.2*  
 239 241 Recent Labs 12/04/19 
0315 12/03/19 
0327 12/02/19 
0255 INR 2.5* 2.6* 2.7* PTP 24.1* 25.5* 25.8* APTT 37.8* 41.9* 39.3* No lab exists for component: PBNP Current Facility-Administered Medications:  
  sildenafil (REVATIO) 2 mg/mL oral suspension 20 mg, 20 mg, Oral, Q8H, Nicho Herrera NP, Stopped at 12/04/19 1000   pantoprazole (PROTONIX) 40 mg in 0.9% sodium chloride 10 mL injection, 40 mg, IntraVENous, DAILY, Checo Herrera NP, 40 mg at 12/04/19 7924   aspirin (ASA) suppository 75 mg, 75 mg, Rectal, DAILY, Nicho Herrera, NP 
  arformoterol (BROVANA) neb solution 15 mcg, 15 mcg, Nebulization, BID RT **AND** budesonide (PULMICORT) 500 mcg/2 ml nebulizer suspension, 500 mcg, Nebulization, BID RT, Checo Herrera NP, 500 mcg at 12/04/19 1116   ipratropium (ATROVENT) 0.02 % nebulizer solution 0.5 mg, 0.5 mg, Nebulization, Q8H, Nicho Herrera NP 
  albuterol (PROVENTIL VENTOLIN) nebulizer solution 2.5 mg, 2.5 mg, Nebulization, Q4H RT, Nicho Herrera NP 
  cefepime (MAXIPIME) 2 g in 0.9% sodium chloride (MBP/ADV) 100 mL, 2 g, IntraVENous, Q12H, Checo Herrera Opasavita LOPEZ NP, Last Rate: 200 mL/hr at 12/04/19 1214, 2 g at 12/04/19 1214 
  magnesium oxide (MAG-OX) tablet 400 mg, 400 mg, Oral, DAILY, Carol Rose MD, Stopped at 12/04/19 0900   potassium chloride SR (KLOR-CON 10) tablet 40 mEq, 40 mEq, Oral, DAILY, Logan Kincaid MD, Stopped at 12/04/19 0900 
  artificial saliva (MOUTH KOTE) 1 Spray, 1 Spray, Oral, PRN, Polliard, Jacobo Hackett NP 
  lactobac ac& pc-s.therm-b.anim (TIAN Q/RISAQUAD), 1 Cap, Oral, DAILY, Camryn Geronimo MD, Stopped at 12/04/19 0900 
  albumin human 25% (BUMINATE) solution 12.5 g, 12.5 g, IntraVENous, BID, Polliard, Юлия LOPEZ NP, 12.5 g at 12/04/19 0930 
  oxyCODONE IR (ROXICODONE) tablet 5 mg, 5 mg, Oral, Q6H PRN, Sherry Arteaga MD, 5 mg at 12/03/19 3797   balsam peru-castor oil (VENELEX) ointment, , Topical, TID, FiserSonny MD 
  [Held by provider] tamsulosin (FLOMAX) capsule 0.4 mg, 0.4 mg, Oral, DAILY, Logan Kincaid MD, Stopped at 12/04/19 0900 
  insulin lispro (HUMALOG) injection, , SubCUTAneous, AC&HS, Shana Sims NP, Stopped at 12/02/19 1630   Warfarin MD/NP dosing, , Other, PRN, Mounika Altman MD 
  epoetin yvonne-epbx (RETACRIT) injection 20,000 Units, 20,000 Units, SubCUTAneous, Q7D, Carol oRse MD, 20,000 Units at 12/03/19 0611   [Held by provider] lidocaine (LIDODERM) 5 % patch 1 Patch, 1 Patch, TransDERmal, Q24H, Shana Sims NP, 1 Patch at 12/03/19 2158   sodium chloride (NS) flush 5-40 mL, 5-40 mL, IntraVENous, Q8H, Sherry Arteaga MD, 10 mL at 12/03/19 5917   sodium chloride (NS) flush 5-40 mL, 5-40 mL, IntraVENous, PRN, Sherry Arteaga MD 
  acetaminophen (TYLENOL) tablet 650 mg, 650 mg, Oral, Q6H PRN, Sherry Arteaga MD, 650 mg at 12/03/19 0857 
  naloxone [facility]) injection 0.4 mg, 0.4 mg, IntraVENous, PRN, Sherry Arteaga MD 
  ondansetron [facility]) injection 4 mg, 4 mg, IntraVENous, Q4H PRN, Sherry Arteaga MD, 4 mg at 11/20/19 2000   [Held by provider] senna-docusate (PERICOLACE) 8.6-50 mg per tablet 1 Tab, 1 Tab, Oral, DAILY, Chandana Elizalde MD, Stopped at 12/04/19 0900   [Held by provider] polyethylene glycol (MIRALAX) packet 17 g, 17 g, Oral, DAILY, Chandana Elizalde MD, Stopped at 12/04/19 0900 
  bisacodyl (DULCOLAX) tablet 5 mg, 5 mg, Oral, DAILY PRN, Chandana Elizalde MD 
  [Held by provider] sucralfate (CARAFATE) tablet 1 g, 1 g, Oral, AC&HS, Chandana Elizalde MD, Stopped at 12/04/19 0730 
  influenza vaccine 2019-20 (6 mos+)(PF) (FLUARIX/FLULAVAL/FLUZONE QUAD) injection 0.5 mL, 0.5 mL, IntraMUSCular, PRIOR TO DISCHARGE, Emilee Altman MD 
  hydrALAZINE (APRESOLINE) 20 mg/mL injection 20 mg, 20 mg, IntraVENous, Q6H PRN, Shana Sims, NP, 20 mg at 11/19/19 0547 
  dextrose 10 % infusion 125-250 mL, 125-250 mL, IntraVENous, PRN, Fred Titus MD, Stopped at 11/18/19 0700 A/P 
  
S/P LVAD - good flows Need for anti-coagulation - coumadin DM - insulin RV dysfunction - milrinone 
  
Risk of morbidity and mortality - high Medical decision making - high complexity Signed By: Brendon Colon MD

## 2019-12-04 NOTE — PROGRESS NOTES
Physical Therapy 12/4/2019 Chart reviewed. Patient transferred to CVICU for respiratory distress. Patient is now on CPAP. RN requesting acute therapy services to hold interventions at this time. Will follow up tomorrow. Thank you.  
Alexa Bird, PT, DPT

## 2019-12-04 NOTE — PROGRESS NOTES
Pharmacy Renal Dosing protocol: 
Day #1 of cefepime Indication:  aspiration PNA Current regimen:  1g q8h Recent Labs 19 
0315 19 
0327 19 
0255 WBC 5.3 5.7 5.8 CREA 1.69* 1.78* 1.70* BUN 20 21* 21* Est CrCl: 48 ml/min; UO: >1 ml/kg/hr Temp (24hrs), Av.8 °F (36.6 °C), Min:97.4 °F (36.3 °C), Max:98.4 °F (36.9 °C) Plan: Change to 2g IV q12h for CrCl 30-60 ml/min

## 2019-12-04 NOTE — PROGRESS NOTES
Chart reviewed, patient transferred from CVSU to CVICU due to respiratory distress and on CPAP. Requesting to hold at this time due to respiratory and responsiveness. Will f/u tomorrow with re-evaluation. Thank you.

## 2019-12-04 NOTE — DIABETES MGMT
[facility] Cardiac Surgery Note Recommendations/ Comments: Pt is s/p LVAD placement. Had brachial thrombectomy 11/26/19. Pt returned to CVICU due to respiratory distress. BG has continued to be relatively stable during LOS ranging  mg/dl over past 48 hours. Has required limited amount of correction insulin while in house. Continuing to follow. Current hospital DM medication: lispro correction scale Chart reviewed and initial evaluation complete on Sona Kiser. Patient is a 71 y.o. male with no prior hx. Recent A1c suggestive of new dx. A1c:  
Lab Results Component Value Date/Time Hemoglobin A1c 6.6 (H) 10/25/2019 02:56 PM  
              
Recent Glucose Results:  
Lab Results Component Value Date/Time GLU 91 12/04/2019 03:15 AM  
 GLUCPOC 96 12/04/2019 07:30 AM  
 GLUCPOC 96 12/03/2019 09:43 PM  
 GLUCPOC 99 12/03/2019 04:42 PM  
  
 
Lab Results Component Value Date/Time Creatinine 1.69 (H) 12/04/2019 03:15 AM  
 
Estimated Creatinine Clearance: 48 mL/min (A) (based on SCr of 1.69 mg/dL (H)). Active Orders Diet DIET REGULAR 3-4 GM (JOSÉ LUIS); FR 1500ML  
  
 
PO intake:  
Patient Vitals for the past 72 hrs: 
 % Diet Eaten 12/03/19 1118 75 % 12/03/19 0743 100 % 12/02/19 0841 50 % 12/01/19 1146 15 % Will continue to follow as needed. Thank you. Afsaneh Sandra RD CDE [facility] Time spent: 4 minutes

## 2019-12-04 NOTE — PROGRESS NOTES
ID Progress Note 2019 Subjective:  
 
Quite weak today--fluid issues--in ICU setting Objective: Antibiotics: 1. Levaquin 2. Rifampin 3. Fluconazole 4. Vancomycin 5. Cefazolin Vitals:  
Visit Vitals BP (!) 74/56 Pulse 72 Temp 98.3 °F (36.8 °C) Resp 16 Ht 6' 2\" (1.88 m) Wt 87 kg (191 lb 12.8 oz) SpO2 98% BMI 24.63 kg/m² Tmax:  Temp (24hrs), Av °F (36.7 °C), Min:97.4 °F (36.3 °C), Max:98.4 °F (36.9 °C) Exam:  Lungs:  clear to auscultation bilaterally Heart:  regular rate and rhythm Abdomen:  soft, non-tender. Bowel sounds normal. No masses,  no organomegaly Urine is clearing up Labs:     
Recent Labs 19 
0315 19 
0327 19 
0255 WBC 5.3 5.7 5.8 HGB 9.0* 8.4* 8.0*  
 239 241 BUN 20 21* 21* CREA 1.69* 1.78* 1.70* SGOT 13* 17 15  103 97 TBILI 0.9 0.9 1.0 Cultures: No results found for: SDES Lab Results Component Value Date/Time Culture result: HEAVY ENTEROCOCCUS SPECIES NOTED (A) 2019 11:10 AM  
 Culture result: LIGHT YEAST (A) 2019 11:10 AM  
 Culture result: NO GROWTH 5 DAYS 2019 11:18 AM  
 
 
Radiology:  
 
Line/Insert Date:        
 
Assessment:  
 
1. UTI--Serratia (2 biotypes) from culture and small amount of Enterococcus 2. CHF/cardiomyopathy 3. ?aspiration event(s) Objective: 1. Adjust antibiotic therapy Reece Gonzalez MD

## 2019-12-04 NOTE — WOUND CARE
WOCN Note:  
  
  
Follow up for pressure injury left hand. No pressure injury left hand: patient had a left brachial thrombectomy. 
   
Assessment: 
Patient awake not communicating. Assists in repositioning and is able to turn independently for assessment with guidance. Patient continent. [facility] Patient reports no pain. 
  
Bilateral heels, buttocks and sacral skin intact and without erythema. Heels offloaded on pillow. 
  
Left arm: post left brachial thrombectomy: hand is warm and well perfused. Moving hand and bending fingers. Lower arm and hand bruised. Note: removed band aid to left thumb: 
Left thumb: 1.5cm x 1.2cm x 0.1cm/ scant drainage / 60% pink and 40% scattered slough. Gill wound skin intact with bruising from prior brachial thrombectomy. 
  
Recommendations:   
- every other day; left thumb: remove present Aquacel ag by moistening with NS, apply new piece of Aquacel Ag cut to size and large band aid used to secure. 
  
Minimize layers of linen/pads under patient to optimize support surface. Turn/reposition approximately every 2 hours and offload heels. Patient is continent. Transition of Care: Plan to follow weekly and as needed while admitted to hospital.  
Ryan Vazquez [phone]UL[address] RN Wound Care Department Office: [phone] Pager: [phone]

## 2019-12-04 NOTE — PROGRESS NOTES
[facility] [facility] in [place], [place] Inpatient Progress Note Patient name: Rebecca Frost Patient : 1950 Patient MRN: [id number] Attending MD: Luisa Bailey MD 
Date of service: 19 Chief Complaint:  
Burt Schwartz azucena LVAD implant 
  
HPI: Sona Kiser is a 71y.o. year old pleasant white male with a history of HTN, HLD, JOSE, CAD s/p cardiac arrest VFib s/p CABG () c/b sternal would infection and sternectomy, ischemic cardiomyopathy LVEF 15-20%, s/p ICD and with LBBB. He was admitted with acute on chronic systolic heart failure with massive volume overload > 20 lbs, in the setting of atrial fibrillation s/p failed DCCV and underwent DCCV and RHC on .  S/p BiVICD on 19 with Dr. Camille Duarte. He was discharged home home on IV milrinone on 19. [facility] has been followed closely by Dr. Jarrod Yen and the [facility]. 
  
Mr. Kiser was admitted for acute on chronic systolic heart failure. He underwent implantation of Impella 5.0 due to CS and has completed his LVAD evaluation. [facility] meets criteria for implant of HM3 as DT. He was NYHA Class IV prior to implant of Impella 5.0, has LVEF < 15%, was intolerant of GDMT due to symptomatic hypotension and renal dysfunction. He remains dependent on temporary MCS support. RHC  revealed compensated hemodynamics on Impella. His renal function has improved dramatically with improvement in his hemodynamics. He is not a suitable transplant candidate due to single kidney, sternectomy, debility, and frailty. He was reviewed by Asad Zimmerman and felt to be a high risk heart transplant candidate due to multiple co-morbidities as well as the afore mentioned conditions.  He remained in the CCU and underwent a HM 3 implant as DT on . He was weaned off of pressors and transferred to [place] [address] where he continues to undergo PT/OT and hemodynamic optimization.   
  
24Hr Events Transferred to CVI due to hypoxia PAd 41 mmHg via CardioMEMs Milrinone resumed, bumex gtt started TTE shows LVIDd improvement to 5.3 cm  
  
Procedure:  Procedure(s): REMOVAL TEMPORARY LEFT VENTRICULAR ASSIST DEVICE, IMPLANTATION OF LEFT VENTRICULAR ASSIST DEVICE PERMANENT (VAD), ECC, JACQUE, EPI AORTIC US BY DR Reza Moss.    
POD:  15 
  
Impression / Plan:  
Heart Failure Status: NYHA Class IV INTERMACS Category 2 
  
Chronic systolic heart failure due to ICM, NYHA Class IV, EF < 15%, cardiogenic shock bridged with Impella 5.0 support to HM 3 implant S/p Impella removal and LVAD implant Continue RPMs at 5600 - LVIDd down to 5.3 cm  
TTE with bubble study negative for PFO Resume milrinone 0.2 mcg/kg/min Bumex gtt at 0.5 mg/hr Monitor PAd - 41 mmHg today Cont Sildenafil 20 mg PO TID - rx sent to local pharmacy to initiate PA No AA/ARB/ARNI post op- will start as appropriate Strict I/O, daily weights, Na+ restricted diet Continue LVAD education Daily dressing changes  
  
Anticoagulation for LVAD, INR goal 2-3 Continue ASA Cont coumadin tonight 3 mg 
  
Acute Respiratory Failure post op Improved with bumex gtt, milrinone gtt Serial XRs Remain in CVICU  
TTE with Bubble study negative for PFO Pulmonary Consult appreciated Pulmonary hygiene Cont home CPAP- must use while sleeping  
  
Post op Pain PRN oxycodone Comfort measures  
  
L Brachial Artery Thrombosis s/p thrombectomy Surgical management per Dr. Christy Arellano On warfarin, INR at goal 
Pain improved  
  
Swelling, pain of right arm, acute Doppler studies (-) for thrombus  
Continue AC Confirmed PICC placement 
  
CKD 3, atrophic left kidney Appreciate Nephrology assistance Assess diuretic dosing daily IV bumex, milrinone Avoid nephrotoxins Trend labs ProNTBNP remains elevated 
  
CAD s/p CABG Cont low dose ASA- watch platelets No BB d/t RV failure Hold statin due to recent hepatic failure LHC performed 11/13 - low likelihood of benefit from revascularization  
  
Hx of VF arrest s/p BiV-ICD No recent shocks Keep K > 4 and Mg > 2  
   
PAF Currently in ST, Paced Hold digoxin - level 2.6 today - do not resume until < 1 Repeat TFTs WNL 
  
Hypokalemia Klor Con to 60 mEq TID PO  
  
Hx of gout Uric acid WNL 
  
Suspected aspiration pneumonia RUL consolidation Suspect aspiration related to over sedation Limit sedatives Sputum culture Zosyn NPO until seen by SLP  
  
Urinary retention, c/b serratia UTI and hematuria Appreciate Urology consult Cystoscopy performed 11/13 shows catheter induced cystitis Repeat UA shows resolution of UTI  
  
Malnutrition Appreciate Nutritionist consult Repeat prealbumin Continue Marinol 5 mg PO BID 
6 small meals daily Hold mirtazapine d/t tremors, confusion  
  
COPD Appreciate Pulmonology assistance Continue nebs PRN   
PFTs complete 
  
Anemia Multifactorial, due to hemolysis + epistaxis + hematuria Intolerant of octreotide or doxycycline for AVM ppx due to prolonged QTc Transfuse for Hgb goal > 8 
  
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  
  
Hx of sternal wound infection, sternectomy Sternum closed post op- monitor  
  
Vocal cord paralysis Improved ENT consult pending Speech therapy following - MBS later today  
  
Debility Continue PT/OT  
  
Ppx Protonix PT/OT Post-op abx complete Bowel regimen Cont Mechanical soft PICC placed 
  
Dispo: 
Likely will need IP rehab. Remain in CVICU for now. LVAD INTERROGATION: 
Device interrogated in person Device function normal, normal flow, no events LVAD Pump Speed (RPM): 8251 Pump Flow (LPM): 4.8 MAP: 80 
PI (Pulsitility Index): 3.3 Power: 4.2  Test: Yes 
Back Up  at Bedside & Labeled: Yes Power Module Test: No 
Driveline Site Care: No 
Driveline Dressing: Clean, Dry, and Intact Outpatient: No 
 MAP in Therapeutic Range (Outpatient): Yes Testing Alarms Reviewed: Yes 
Back up SC speed: 5600 Back up Low Speed Limit: 5000 Emergency Equipment with Patient?: Yes Emergency procedures reviewed?: Yes Drive line site inspected?: No 
Drive line intergrity inspected?: Yes Drive line dressing changed?: No 
 
PHYSICAL EXAM: 
Visit Vitals BP (!) 74/56 Pulse 77 Temp 97.9 °F (36.6 °C) Resp 17 Ht 6' 2\" (1.88 m) Wt 191 lb 12.8 oz (87 kg) SpO2 100% BMI 24.63 kg/m² General: Patient is well developed, well-nourished in no acute distress HEENT: Normocephalic and atraumatic. No scleral icterus. Pupils are equal, round and reactive to light and accomodation. No conjunctival injection. Oropharynx is clear. Neck: Supple. No evidence of thyroid enlargements or lymphadenopathy. JVD: Cannot be appreciated Lungs: Breath sounds are equal and clear bilaterally. No wheezes, rhonchi, or rales. Heart: Regular rate and rhythm with normal S1 and S2. No murmurs, gallops or rubs. Abdomen: Soft, no mass or tenderness. No organomegaly or hernia. Bowel sounds present. Genitourinary and rectal: deferred Extremities: No cyanosis, clubbing, or edema. Neurologic: No focal sensory or motor deficits are noted. Grossly intact. Psychiatric: Awake, alert an doriented x 3. Appropriate mood and affect. Skin: Warm, dry and well perfused. No lesions, nodules or rashes are noted. REVIEW OF SYSTEMS: 
General: Denies fever, night sweats. Ear, nose and throat: Denies difficulty hearing, sinus problems, runny nose, post-nasal drip, ringing in ears, mouth sores, loose teeth, ear pain, nosebleeds, sore throate, facial pain or numbess Cardiovascular: see above in the interval history Respiratory: Reports dyspnea Gastrointestinal: Denies heartburn, constipation, intolerance to certain foods, diarrhea, abdominal pain, nausea, vomiting, difficulty swallowing, blood in stool Kidney and bladder: Denies painful urination, frequent urination, urgency, prostate problems and impotence Musculoskeletal: Denies joint pain, muscle weakness Skin and hair: Denies change in existing skin lesions, hair loss or increase, breast changes PAST MEDICAL HISTORY: 
Past Medical History:  
Diagnosis Date  Degenerative disc disease, lumbar  Heart failure ([facility]  High cholesterol  Hypertension  Paroxysmal atrial fibrillation ([facility] 4/2/2019  Spinal stenosis PAST SURGICAL HISTORY: 
Past Surgical History:  
Procedure Laterality Date  COLONOSCOPY Left 11/11/2019 COLONOSCOPY at bedside performed by Sharla Das MD at [address]  HX CORONARY ARTERY BYPASS GRAFT    
 triple  HX HERNIA REPAIR    
 HX IMPLANTABLE CARDIOVERTER DEFIBRILLATOR  [facility] CARDIOVERSION ELECTIVE ARRHYTHMIA EXTERNAL N/A 6/10/2019 EP CARDIOVERSION performed by Anton Durham MD at [address] CATH LAB  VA CARDIOVERSION ELECTIVE ARRHYTHMIA EXTERNAL N/A 6/18/2019 EP C[facility]ERS[facility] [address] [facility]  VA INSJ/RPLCMT PERM DFB W/TRNSVNS LDS 1/DUAL CHMBR N/A 6/21/2019 INSERT ICD BIV MULTI performed by James Cr MD at [address] CATH LAB  [facility] TCAT IMPL WRLS P-ART PRS SNR L-T HEMODYN MNTR N/A 9/18/2019 IMPLANT HEART FAILURE MONITORING DEVICE performed by Maikel Lawrence MD at [address] CATH LAB FAMILY HISTORY: 
Family History Problem Relation Age of Onset  Lung Disease Mother  Hypertension Mother Birder Moron Arthritis-osteo Mother  Heart Disease Mother  Heart Disease Father  Diabetes Father SOCIAL HISTORY: 
Social History Socioeconomic History  Marital status:  Spouse name: Not on file  Number of children: Not on file  Years of education: Not on file  Highest education level: Not on file Tobacco Use  Smoking status: Former Smoker Last attempt to quit: 2010 Years since quittin.0  Smokeless tobacco: Never Used Substance and Sexual Activity  Alcohol use: Not Currently Comment: rarely  Drug use: Never Other Topics Concern LABORATORY RESULTS: 
  
Labs Latest Ref Rng & Units 2019 12/3/2019 2019 2019 2019 2019 2019 WBC 4.1 - 11.1 K/uL 5.3 5.7 5.8 7.4 7.3 7.9 7.1  
RBC 4.10 - 5.70 M/uL 3.00(L) 2.84(L) 2.63(L) 2.43(L) 2.82(L) 2.69(L) 2.83(L) Hemoglobin 12.1 - 17.0 g/dL 9. 0(L) 8.4(L) 8.0(L) 7. 3(L) 8.4(L) 8. 1(L) 8.5(L) Hematocrit 36.6 - 50.3 % 29. 6(L) 28. 3(L) 26. 2(L) 24. 0(L) 27. 9(L) 26. 7(L) 27. 9(L) MCV 80.0 - 99.0 FL 98.7 99. 6(H) 99. 6(H) 98.8 98.9 99. 3(H) 98.6 Platelets 316 - 731 K/uL 235 239 241 265 244 219 216 Lymphocytes 12 - 49 % - - - - - - - Monocytes 5 - 13 % - - - - - - - Eosinophils 0 - 7 % - - - - - - - Basophils 0 - 1 % - - - - - - - Albumin 3.5 - 5.0 g/dL 2. 7(L) 2. 4(L) 2. 6(L) 2. 6(L) 2. 4(L) 2. 2(L) 2. 2(L) Calcium 8.5 - 10.1 MG/DL 9.1 9.1 9.0 8.8 8.7 8.6 8.4(L) SGOT 15 - 37 U/L 13(L) 17 15 14(L) 15 12(L) 13(L) Glucose 65 - 100 mg/dL 91 90 91 92 118(H) 141(H) 135(H) BUN 6 - 20 MG/DL 20 21(H) 21(H) 20 19 17 16 Creatinine 0.70 - 1.30 MG/DL 1.69(H) 1.78(H) 1.70(H) 1.65(H) 1.55(H) 1.33(H) 1.12 Sodium 136 - 145 mmol/L 138 135(L) 135(L) 133(L) 134(L) 131(L) 132(L) Potassium 3.5 - 5.1 mmol/L 4.1 4.9 5.1 4.5 4.1 4.2 4.1 TSH 0.36 - 3.74 uIU/mL - 2.30 - - - - -  
PSA 0.01 - 4.0 ng/mL - - - - - - -  
LDH 85 - 241 U/L 286(H) 348(H) 271(H) 271(H) 256(H) 246(H) 218 CEA ng/mL - - - - - - - Some recent data might be hidden Lab Results Component Value Date/Time TSH 2.30 2019 03:29 AM  
 TSH 2.40 10/25/2019 07:39 PM  
 TSH 2.45 2019 04:16 AM  
 
 
ALLERGY: 
No Known Allergies CURRENT MEDICATIONS: 
 
Current Facility-Administered Medications:  
  sildenafil (REVATIO) 2 mg/mL oral suspension 20 mg, 20 mg, Oral, Q8H, Chuy Matos, TIERRA, Stopped at 12/04/19 1000   pantoprazole (PROTONIX) 40 mg in 0.9% sodium chloride 10 mL injection, 40 mg, IntraVENous, DAILY, Angelo Herrera NP, 40 mg at 12/04/19 0941   aspirin (ASA) suppository 75 mg, 75 mg, Rectal, DAILY, Angelo Herrera NP, 75 mg at 12/04/19 1223   arformoterol (BROVANA) neb solution 15 mcg, 15 mcg, Nebulization, BID RT **AND** budesonide (PULMICORT) 500 mcg/2 ml nebulizer suspension, 500 mcg, Nebulization, BID RT, Angelo Herrera NP, 500 mcg at 12/04/19 1116   ipratropium (ATROVENT) 0.02 % nebulizer solution 0.5 mg, 0.5 mg, Nebulization, Q8H, Angelo Herrera NP, 0.5 mg at 12/04/19 1552   albuterol (PROVENTIL VENTOLIN) nebulizer solution 2.5 mg, 2.5 mg, Nebulization, Q4H RT, Angelo Herrera NP, 2.5 mg at 12/04/19 1552   bumetanide (BUMEX) 0.25 mg/mL infusion, 0.5 mg/hr, IntraVENous, CONTINUOUS, Angelo Herrera NP, Last Rate: 2 mL/hr at 12/04/19 1305, 0.5 mg/hr at 12/04/19 1305   milrinone (PRIMACOR) 20 MG/100 ML D5W infusion, 0.2 mcg/kg/min, IntraVENous, CONTINUOUS, Gail Andrews MD, Last Rate: 5.2 mL/hr at 12/04/19 1321, 0.2 mcg/kg/min at 12/04/19 1321 
  piperacillin-tazobactam (ZOSYN) 3.375 g in 0.9% sodium chloride (MBP/ADV) 100 mL, 3.375 g, IntraVENous, Q8H, Ruben Olivares MD, Last Rate: 25 mL/hr at 12/04/19 1625, 3.375 g at 12/04/19 1625 
  magnesium oxide (MAG-OX) tablet 400 mg, 400 mg, Oral, DAILY, Rudy Carmichael MD, Stopped at 12/04/19 0900   potassium chloride SR (KLOR-CON 10) tablet 40 mEq, 40 mEq, Oral, DAILY, Logan Kincaid MD, Stopped at 12/04/19 0900 
  artificial saliva (MOUTH KOTE) 1 Spray, 1 Spray, Oral, PRN, Sharon, Preston Omalley NP 
  lactobac ac& pc-s.therm-b.anim (TIAN Q/RISAQUAD), 1 Cap, Oral, DAILY, Carmen Baca MD, Stopped at 12/04/19 0900 
  albumin human 25% (BUMINATE) solution 12.5 g, 12.5 g, IntraVENous, BID, Angelo Herrera NP, 12.5 g at 12/04/19 0930   oxyCODONE IR (ROXICODONE) tablet 5 mg, 5 mg, Oral, Q6H PRN, Larissa Elizabeth MD, 5 mg at 12/03/19 0523   balsam peru-castor oil (VENELEX) ointment, , Topical, TID, Marlo Andrews MD 
  [Held by provider] tamsulosin (FLOMAX) capsule 0.4 mg, 0.4 mg, Oral, DAILY, Logan Kincaid MD, Stopped at 12/04/19 0900 
  insulin lispro (HUMALOG) injection, , SubCUTAneous, AC&HS, Shana Sims NP, Stopped at 12/02/19 1630   Warfarin MD/NP dosing, , Other, PRN, Mounika Altman MD 
  epoetin yvonne-epbx (RETACRIT) injection 20,000 Units, 20,000 Units, SubCUTAneous, Q7D, Nina Giraldo MD, 20,000 Units at 12/03/19 6417   [Held by provider] lidocaine (LIDODERM) 5 % patch 1 Patch, 1 Patch, TransDERmal, Q24H, Shana Sims NP, 1 Patch at 12/03/19 2158   sodium chloride (NS) flush 5-40 mL, 5-40 mL, IntraVENous, Q8H, Harshal Vela MD, 10 mL at 12/04/19 1400 
  sodium chloride (NS) flush 5-40 mL, 5-40 mL, IntraVENous, PRN, Larissa Elizabeth MD 
  acetaminophen (TYLENOL) tablet 650 mg, 650 mg, Oral, Q6H PRN, Larissa Elizabeth MD, 650 mg at 12/03/19 0857 
  naloxone [facility]) injection 0.4 mg, 0.4 mg, IntraVENous, PRN, Larissa Elizabeth MD 
  ondansetron [facility]) injection 4 mg, 4 mg, IntraVENous, Q4H PRN, Larissa Elizabeth MD, 4 mg at 11/20/19 2000   [Held by provider] senna-docusate (PERICOLACE) 8.6-50 mg per tablet 1 Tab, 1 Tab, Oral, DAILY, Larissa Elizabeth MD, Stopped at 12/04/19 0900   [Held by provider] polyethylene glycol (MIRALAX) packet 17 g, 17 g, Oral, DAILY, Larissa Elizabeth MD, Stopped at 12/04/19 0900 
  bisacodyl (DULCOLAX) tablet 5 mg, 5 mg, Oral, DAILY PRN, Larissa Elizabeth MD 
  [Held by provider] sucralfate (CARAFATE) tablet 1 g, 1 g, Oral, AC&HS, Larissa Elizabeth MD, Stopped at 12/04/19 0730 
  influenza vaccine 2019-20 (6 mos+)(PF) (FLUARIX/FLULAVAL/FLUZONE QUAD) injection 0.5 mL, 0.5 mL, IntraMUSCular, PRIOR TO DISCHARGE, Clem Altman MD 
   hydrALAZINE (APRESOLINE) 20 mg/mL injection 20 mg, 20 mg, IntraVENous, Q6H PRN, Shana Sims NP, 20 mg at 11/19/19 0547 
  dextrose 10 % infusion 125-250 mL, 125-250 mL, IntraVENous, PRN, Alok Shah MD, Stopped at 11/18/19 0700 Thank you for allowing me to participate in this patient's care. Marianna Colbert AGADana-Farber Cancer Institute-[facility] Medical Centerjordin Rueda [facility] [place] [address], [address] Phone: [phone] Fax: [phone] [facility] ATTENDING ADDENDUM Patient was seen and examined in person. Data and notes were reviewed. I have discussed and agree with the plan as noted in the NP note above without further additions. Isauro Parson MD PhD 
[place] Rueda [address] [place] [address]

## 2019-12-04 NOTE — PROGRESS NOTES
2000 - TRANSFER - IN REPORT: 
 
Verbal report received from Patrick Candelario RN(name) on [address]  being received from CVSU(unit) for change in patient condition(CVP monitoring) Report consisted of patients Situation, Background, Assessment and  
Recommendations(SBAR). Information from the following report(s) SBAR, Kardex, Intake/Output, MAR, Recent Results, Cardiac Rhythm Paced and Alarm Parameters  was reviewed with the receiving nurse. Opportunity for questions and clarification was provided. Assessment completed upon patients arrival to unit and care assumed. 2030 - Pt assessed. Pt AOx4, moving all extremities, following commands, slight tremors in upper extremities noted and slight intermittent confusion. 2050 - Transducer set up at pt bedside to transduce CVP from pt PICC line. CVP = 10 Informed Dr. Araseli Gomez, heart failure, of pt vitals and newly transduced CVP. No new orders received. Dr. Marx Stage states Q2 maps while pt is awake, Q4 maps while pt is asleep. Will continue to monitor patient. 0139 - Pt taking nasal canula off and states, \"I think I want my CPAP on now, this thing keeps coming off. \"  Pt home cpap set up with 2.5L o2 bled into cpap. Pt tolerating cpap mask at this time and no attempts to take it off. 
0230 - Pt taking off cpap mask and wishes for nasal canula. Pt continues to be dyspneic despite 94-96% on 3L nc. 
0700 - Dr. Emerita Abrams at bedside. Updated on pt condition overnight. MD placed new orders. 9697 - RT at bedside for ABG. Results as follows: 
Ph = 7.413 CO2 = 42.3 PO2 = 76 HCO3 = 27 Base excess = 2 
O2 sat = 95% Pt still dyspneic and states, \"It is hard to breathe this morning. \"   
0730 - Bedside and Verbal shift change report given to Katherine Toussaint RN (oncoming nurse) by Aaron Martini RN (offgoing nurse). Report included the following information SBAR, Kardex, Intake/Output, MAR, Recent Results, Cardiac Rhythm Paced and Alarm Parameters .

## 2019-12-04 NOTE — PROGRESS NOTES
Problem: Falls - Risk of 
Goal: *Absence of Falls Description Document Giuliana Reyes Fall Risk and appropriate interventions in the flowsheet. Outcome: Progressing Towards Goal 
Note: Fall Risk Interventions: 
Mobility Interventions: Communicate number of staff needed for ambulation/transfer Mentation Interventions: Adequate sleep, hydration, pain control, Evaluate medications/consider consulting pharmacy Medication Interventions: Evaluate medications/consider consulting pharmacy Elimination Interventions: Call light in reach, Patient to call for help with toileting needs, Toileting schedule/hourly rounds History of Falls Interventions: Consult care management for discharge planning, Door open when patient unattended Problem: Diabetes Self-Management Goal: *Disease process and treatment process Description Define diabetes and identify own type of diabetes; list 3 options for treating diabetes. Outcome: Progressing Towards Goal 
Goal: *Monitoring blood glucose, interpreting and using results Description Identify recommended blood glucose targets  and personal targets. Outcome: Progressing Towards Goal 
Goal: *Prevention, detection, treatment of acute complications Description List symptoms of hyper- and hypoglycemia; describe how to treat low blood sugar and actions for lowering  high blood glucose level. Outcome: Progressing Towards Goal 
Goal: *Prevention, detection and treatment of chronic complications Description Define the natural course of diabetes and describe the relationship of blood glucose levels to long term complications of diabetes. Outcome: Progressing Towards Goal 
  
Problem: Pressure Injury - Risk of 
Goal: *Prevention of pressure injury Description Document Mich Scale and appropriate interventions in the flowsheet.  
Outcome: Progressing Towards Goal 
Note: Pressure Injury Interventions: 
Sensory Interventions: Assess changes in LOC, Assess need for specialty bed, Avoid rigorous massage over bony prominences, Chair cushion, Check visual cues for pain, Maintain/enhance activity level, Keep linens dry and wrinkle-free, Minimize linen layers, Monitor skin under medical devices, Pad between skin to skin, Pressure redistribution bed/mattress (bed type), Sit a 90-degree angle/use footstool if needed, Suspension boots, Turn and reposition approx. every two hours (pillows and wedges if needed), Use 30-degree side-lying position Moisture Interventions: Absorbent underpads, Apply protective barrier, creams and emollients, Limit adult briefs, Maintain skin hydration (lotion/cream), Minimize layers, Moisture barrier, Offer toileting Q_hr Activity Interventions: Increase time out of bed, Pressure redistribution bed/mattress(bed type), PT/OT evaluation Mobility Interventions: Assess need for specialty bed, Pressure redistribution bed/mattress (bed type), Turn and reposition approx. every two hours(pillow and wedges) Nutrition Interventions: Document food/fluid/supplement intake, Discuss nutritional consult with provider Friction and Shear Interventions: Apply protective barrier, creams and emollients, HOB 30 degrees or less, Lift sheet, Lift team/patient mobility team, Minimize layers, Transferring/repositioning devices Problem: Nutrition Deficit Goal: *Optimize nutritional status Outcome: Progressing Towards Goal 
  
Problem: Patient Education: Go to Patient Education Activity Goal: Patient/Family Education Outcome: Progressing Towards Goal 
  
Problem: LVAD Post-op Day 15 to Discharge Goal: Activity/Safety Outcome: Progressing Towards Goal 
Goal: Nutrition/Diet Outcome: Progressing Towards Goal 
Goal: Medications Outcome: Progressing Towards Goal 
Goal: Respiratory Outcome: Progressing Towards Goal 
  
Problem: Impaired Skin Integrity/Pressure Injury Treatment Goal: *Improvement of Existing Pressure Injury Outcome: Progressing Towards Goal

## 2019-12-04 NOTE — PROGRESS NOTES
[facility] VAD/Heart Failure Progress Note Admit Date: 10/25/2019 POD:  9 Days Post-Op Procedure:  Procedure(s): LEFT BRACHIAL THROMBECTOMY Subjective:  
Mild dyspnea, fatigue, and weakness; having some confusion Objective:  
Vitals: 
Blood pressure (!) 74/56, pulse 87, temperature 98.4 °F (36.9 °C), resp. rate 18, height 6' 2\" (1.88 m), weight 175 lb (79.4 kg), SpO2 94 %. Temp (24hrs), Av.9 °F (36.6 °C), Min:97.4 °F (36.3 °C), Max:98.4 °F (36.9 °C) Hemodynamics: 
 CO: CO (l/min): 5.4 l/min CI: CI (l/min/m2): 2.5 l/min/m2 CVP: CVP (mmHg): 10 mmHg (19 0400) SVR: SVR (dyne*sec)/cm5: 1067 (dyne*sec)/cm5 (19 1200) PAP Systolic: PAP Systolic: 50 (38/86/96 9080) PAP Diastolic: PAP Diastolic: 30 (18/22/15 4471) PVR:   
 SV02: SVO2 (%): 45 % (19 1200) SCV02: SCVO2 (%): 75 % (10/29/19 1900) VAD Interrogation: LVAD (Heartmate) Pump Speed (RPM): 7687 Pump Flow (LPM): 4.7 PI (Pulsitility Index): 3.4 Power: 4.2 MAP: 88  Test: No 
Back Up  at Bedside & Labeled: Yes Power Module Test: No 
Driveline Site Care: No 
Driveline Dressing: Clean, Dry, and Intact EKG/Rhythm:   
 
Extubation Date / Time:  
 
CT Output:  
 
Ventilator: 
Ventilator Volumes Vt Set (ml): 550 ml (19 08) Vt Exhaled (Machine Breath) (ml): 639 ml (19 08) Vt Spont (ml): 437 ml (19 1035) Ve Observed (l/min): 7.25 l/min (19 1035) Oxygen Therapy: 
Oxygen Therapy O2 Sat (%): 94 % (19) Pulse via Oximetry: 76 beats per minute (19) O2 Device: Nasal cannula (19) O2 Flow Rate (L/min): 2 l/min (19) FIO2 (%): 50 % (19) CXR: 
 
Admission Weight: Last Weight Weight: 192 lb 10.9 oz (87.4 kg) Weight: 175 lb (79.4 kg) Intake / Output / Drain: 
Current Shift: 1901 - 700 In: -  
Out: 5478 [ROEGN:8087] Last 24 hrs.:  
 
 Intake/Output Summary (Last 24 hours) at 12/4/2019 [facility] Last data filed at 12/4/2019 0400 Gross per 24 hour Intake 453.15 ml Output 2400 ml Net -1946.85 ml Recent Labs 12/02/19 
1140 CPK 23* Recent Labs 12/04/19 
0315 12/03/19 
0327 12/02/19 
0255  135* 135* K 4.1 4.9 5.1 CO2 29 28 30 BUN 20 21* 21* CREA 1.69* 1.78* 1.70* GLU 91 90 91 MG 2.5* 2.5* 2.4 WBC 5.3 5.7 5.8 HGB 9.0* 8.4* 8.0*  
HCT 29.6* 28.3* 26.2*  
 239 241 Recent Labs 12/04/19 
0315 12/03/19 
0327 12/02/19 
0255 INR 2.5* 2.6* 2.7* PTP 24.1* 25.5* 25.8* APTT 37.8* 41.9* 39.3* No lab exists for component: PBNP Current Facility-Administered Medications:  
  albuterol-ipratropium (DUO-NEB) 2.5 MG-0.5 MG/3 ML, 3 mL, Nebulization, Q6H RT, Antwan Herrera NP, 3 mL at 12/04/19 2683   magnesium oxide (MAG-OX) tablet 400 mg, 400 mg, Oral, DAILY, Logan Kincaid MD 
  potassium chloride SR (KLOR-CON 10) tablet 40 mEq, 40 mEq, Oral, DAILY, Logan Kincaid MD 
  artificial saliva (MOUTH KOTE) 1 Spray, 1 Spray, Oral, PRN, Analisa Herrera NP 
  [Held by provider] bumetanide (BUMEX) injection 1 mg, 1 mg, IntraVENous, BID, Analisa Herrera NP, 1 mg at 12/03/19 0843 
  lactobac ac& pc-s.therm-b.anim (TIAN Q/RISAQUAD), 1 Cap, Oral, DAILY, Laxmi Jones MD, 1 Cap at 12/03/19 3622   albumin human 25% (BUMINATE) solution 12.5 g, 12.5 g, IntraVENous, BID, Antwan Herrera NP, 12.5 g at 12/03/19 1803 
  oxyCODONE IR (ROXICODONE) tablet 5 mg, 5 mg, Oral, Q6H PRN, Brittany Roman MD, 5 mg at 12/03/19 6264   mirtazapine (REMERON) tablet 15 mg, 15 mg, Oral, QHS, Phyllis Amaya NP, 15 mg at 12/03/19 2151   balsam peru-castor oil (VENELEX) ointment, , Topical, TID, Jimenez Andrews MD 
  [facility]) capsule 0.4 mg, 0.4 mg, Oral, DAILY, Logan Kincaid MD, 0.4 mg at 12/03/19 5420   aspirin chewable tablet 81 mg, 81 mg, Oral, DAILY, Levi, Shana B, NP, 81 mg at 12/03/19 2371   sildenafil (antihypertensive) (REVATIO) tablet 20 mg, 20 mg, Oral, TID, Levi, Shana B, NP, 20 mg at 12/03/19 2158   pantoprazole (PROTONIX) tablet 40 mg, 40 mg, Oral, ACB, Levi, Shana B, NP, 40 mg at 12/03/19 5828   insulin lispro (HUMALOG) injection, , SubCUTAneous, AC&HS, Levi, Shana B, NP, Stopped at 12/02/19 1630   Warfarin MD/NP dosing, , Other, PRN, Mounika Altman MD 
  epoetin yvonne-epbx (RETACRIT) injection 20,000 Units, 20,000 Units, SubCUTAneous, Q7D, Orlando Tristan MD, 20,000 Units at 12/03/19 2841   lidocaine (LIDODERM) 5 % patch 1 Patch, 1 Patch, TransDERmal, Q24H, Levi, Shana B, NP, 1 Patch at 12/03/19 2158   sodium chloride (NS) flush 5-40 mL, 5-40 mL, IntraVENous, Q8H, Chandana Elizalde MD, 10 mL at 12/03/19 9825   sodium chloride (NS) flush 5-40 mL, 5-40 mL, IntraVENous, PRN, Chandana Elizalde MD 
  acetaminophen (TYLENOL) tablet 650 mg, 650 mg, Oral, Q6H PRN, Chandana Elizalde MD, 650 mg at 12/03/19 0857 
  naloxone [facility]) injection 0.4 mg, 0.4 mg, IntraVENous, PRN, Chandana Elizalde MD 
  ondansetron [facility]) injection 4 mg, 4 mg, IntraVENous, Q4H PRN, Chandana Elizalde MD, 4 mg at 11/20/19 2000   senna-docusate (PERICOLACE) 8.6-50 mg per tablet 1 Tab, 1 Tab, Oral, DAILY, Chandana Elizalde MD, 1 Tab at 12/03/19 2290   polyethylene glycol (MIRALAX) packet 17 g, 17 g, Oral, DAILY, Chandana Elizalde MD, 17 g at 12/03/19 4436   bisacodyl (DULCOLAX) tablet 5 mg, 5 mg, Oral, DAILY PRN, Chandana Elizalde MD 
  sucralfate (CARAFATE) tablet 1 g, 1 g, Oral, AC&HS, Chandana Elizalde MD, 1 g at 12/03/19 1592   influenza vaccine 2019-20 (6 mos+)(PF) (FLUARIX/FLULAVAL/FLUZONE QUAD) injection 0.5 mL, 0.5 mL, IntraMUSCular, PRIOR TO DISCHARGE, Mounika Altman MD 
  hydrALAZINE (APRESOLINE) 20 mg/mL injection 20 mg, 20 mg, IntraVENous, Q6H PRN, Shana Sims NP, 20 mg at 11/19/19 0547 
  dextrose 10 % infusion 125-250 mL, 125-250 mL, IntraVENous, PRN, Kendall Govea MD, Stopped at 11/18/19 0700 A/P 
  
S/P LVAD - good flows Need for anti-coagulation - coumadin DM - insulin RV dysfunction - milrinone 
  
Risk of morbidity and mortality - high Medical decision making - high complexity 
  
 
Signed By: Rufus Mills MD

## 2019-12-05 NOTE — PROGRESS NOTES
SLP Contact Note SLP following this patient for dysphagia. Pt seen by ENT who plans to complete scope this afternoon, per primary team.  Pt currently not appropriate to participate in PO trials given mentation. Per primary team, ENT also noted to recommend a repeat Modified Barium Swallow Study with head of bed elevated. Important to note that Modified Barium Swallow Study just completed two days ago and pt was alert and was completely upright in hausted chair during the entirety of study, and therefore do not feel that his positioning is related to his aspiration. Thank you, Tolu Reese M.Ed, CCC-SLP Speech-Language Pathologist

## 2019-12-05 NOTE — PROGRESS NOTES
[facility] 
 [address] [facility], [address] [facility] Phone: [phone]   [phone]   
  
Nephrology Progress Note Sona Kiser     1950     [id number] Date of Admission : 10/25/2019 
12/05/19 CC:  Follow up for JUAN J, CKD, Hyponatremia Assessment and Plan JUAN J on CKD - IV bumex 2 mg on 12/4 and started Bumex gtt at 0.5 mg/ hr  
- excellent diuresis with 5.4 L UO in the past 24 hrs. 
- renal function stable despite aggressive diuresis: Bun/creat 10/1.5 on 12/5 
- bicarb up to 31 and K down. Will decrease Bumex later today to slow diuresis down a bit - Hypokalemia secondary to diuretics -  KCL ordered. Will cut Bumex rate later today Hyponatremia: 
- 2/2 CHF 
- Na up to 140 on 12/5 
  
Gross hematuria, BPH w/ retention: 
- off CBI pre VAD. cysto pre op showed catheter related Cystitis @ postr wall  
- neal had to be replaced due to urinary retention - On flomax  
- keep neal for now until he is stable from CHF stand point Acute on Chronic HFrEF  
- EF 16-20%, NYHA Class IV , hx of VF arrest - s/p AICD 
- Impella insertion 10/29- removed 11/18  
- s/p LVAD placement on 11/18 CKD Stage III  
- Mild Left renal atrophy at baseline Hoarseness, vocal cord paralysis, mediastinal adenopathy: 
- will need eventual PET/CT 
  
L arm clot s/p thrombectomy on 11/25 JOSE on CPAP  
  
PAF s/p DCCV 6/19 
  
Anemia: 
- from blood loss s/p multiple blood products - s/p IV iron,  Repeat iron studies ok 
- on PAVEL q7 d Serratia and Enteroccocus UTI: 
- completed abx Above d/w the pt's RN Interval History:   
Seen and examined On 12/4: Continued worsening of SOB Moved to CVICU overnight Quite SOB to talk even a couple of words Reports cough, clear to yellow sputum On 12/5: pt up in a chair. Says he gets SOB if he moves around and says he feels about the same as yesterday. However, per above description, he seems better: certainly able to converse with me without becoming SOB. Appears comfortable from a resp standpoint. Excellent UO on the Bumex bolus/drip: 5400 cc. Input 625 cc Review of Systems: as above, plus: pain from surgery sites on chest and left arm and the IV's in the right arm; no abd pain; no N/V or other complaints. Current Medications:  
Current Facility-Administered Medications Medication Dose Route Frequency  potassium chloride 20 mEq in 50 ml IVPB  20 mEq IntraVENous Q1H  
 sildenafil (REVATIO) 2 mg/mL oral suspension 20 mg  20 mg Oral Q8H  
 pantoprazole (PROTONIX) 40 mg in 0.9% sodium chloride 10 mL injection  40 mg IntraVENous DAILY  aspirin (ASA) suppository 75 mg  75 mg Rectal DAILY  arformoterol (BROVANA) neb solution 15 mcg  15 mcg Nebulization BID RT And  
 budesonide (PULMICORT) 500 mcg/2 ml nebulizer suspension  500 mcg Nebulization BID RT  
 ipratropium (ATROVENT) 0.02 % nebulizer solution 0.5 mg  0.5 mg Nebulization Q8H  
 albuterol (PROVENTIL VENTOLIN) nebulizer solution 2.5 mg  2.5 mg Nebulization Q4H RT  
 bumetanide (BUMEX) 0.25 mg/mL infusion  0.5 mg/hr IntraVENous CONTINUOUS  
 milrinone (PRIMACOR) 20 MG/100 ML D5W infusion  0.2 mcg/kg/min IntraVENous CONTINUOUS  piperacillin-tazobactam (ZOSYN) 3.375 g in 0.9% sodium chloride (MBP/ADV) 100 mL  3.375 g IntraVENous Q8H  
 magnesium oxide (MAG-OX) tablet 400 mg  400 mg Oral DAILY  potassium chloride SR (KLOR-CON 10) tablet 40 mEq  40 mEq Oral DAILY  artificial saliva (MOUTH KOTE) 1 Spray  1 Spray Oral PRN  
 lactobac ac& pc-s.therm-b.anim (TIAN Q/RISAQUAD)  1 Cap Oral DAILY  albumin human 25% (BUMINATE) solution 12.5 g  12.5 g IntraVENous BID  
 oxyCODONE IR (ROXICODONE) tablet 5 mg  5 mg Oral Q6H PRN  
 balsam peru-castor oil (VENELEX) ointment   Topical TID  [Held by provider] tamsulosin (FLOMAX) capsule 0.4 mg  0.4 mg Oral DAILY  insulin lispro (HUMALOG) injection   SubCUTAneous AC&HS  Warfarin MD/NP dosing   Other PRN  
 epoetin yvonne-epbx (RETACRIT) injection 20,000 Units  20,000 Units SubCUTAneous Q7D  
 [Held by provider] lidocaine (LIDODERM) 5 % patch 1 Patch  1 Patch TransDERmal Q24H  
 sodium chloride (NS) flush 5-40 mL  5-40 mL IntraVENous Q8H  
 sodium chloride (NS) flush 5-40 mL  5-40 mL IntraVENous PRN  
 acetaminophen (TYLENOL) tablet 650 mg  650 mg Oral Q6H PRN  
 naloxone (NARCAN) injection 0.4 mg  0.4 mg IntraVENous PRN  
 ondansetron (ZOFRAN) injection 4 mg  4 mg IntraVENous Q4H PRN  
 [Held by provider] senna-docusate (PERICOLACE) 8.6-50 mg per tablet 1 Tab  1 Tab Oral DAILY  [Held by provider] polyethylene glycol (MIRALAX) packet 17 g  17 g Oral DAILY  bisacodyl (DULCOLAX) tablet 5 mg  5 mg Oral DAILY PRN  
 [Held by provider] sucralfate (CARAFATE) tablet 1 g  1 g Oral AC&HS  influenza vaccine 2019-20 (6 mos+)(PF) (FLUARIX/FLULAVAL/FLUZONE QUAD) injection 0.5 mL  0.5 mL IntraMUSCular PRIOR TO DISCHARGE  hydrALAZINE (APRESOLINE) 20 mg/mL injection 20 mg  20 mg IntraVENous Q6H PRN  
 dextrose 10 % infusion 125-250 mL  125-250 mL IntraVENous PRN No Known Allergies Objective: 
Vitals:   
Vitals:  
 12/05/19 0400 12/05/19 0500 12/05/19 0600 12/05/19 4825 BP:      
Pulse: 78 77 81 Resp: 22 20 26 Temp: 98 °F (36.7 °C) SpO2: 97% 97% 99% Weight:    73.9 kg (162 lb 14.7 oz) Height:      
 
Intake and Output: 
12/04 1901 - 12/05 0700 In: 179.2 [I.V.:179.2] Out: 1985 [SDLUQ:0372] 12/03 0701 - 12/04 1900 In: 898.4 [P.O.:440; I.V.:458.4] Out: 5825 [XCAON:4862] Physical Examination: 
 
General: Awake and alert. Up in a chair. In no distress Neck:  Lines + Resp:  Decreased bibasilar breath sounds; no resp distress CV:  VAD sounds; no LE edema GI:  Soft and non-tender; no distension Neurologic:  Alert and appropriate; normal speech; no tremor :  + neal; clear urine Psych:               Normal affect and mood 
 
[x]    High complexity decision making was performed 
[x]    Patient is at high-risk of decompensation with multiple organ involvement Lab Data Personally Reviewed: I have reviewed all the pertinent labs, microbiology data and radiology studies during assessment. Recent Labs 12/05/19 0321 12/04/19 0315 12/03/19 0327  138 135* K 3.0* 4.1 4.9  102 103 CO2 31 29 28 * 91 90 BUN 19 20 21* CREA 1.52* 1.69* 1.78* CA 9.2 9.1 9.1 MG 2.3 2.5* 2.5* ALB 2.9* 2.7* 2.4* SGOT 11* 13* 17 ALT 7* 9* 8* INR 2.8* 2.5* 2.6* Recent Labs 12/05/19 0321 12/04/19 0315 12/03/19 0327 WBC 5.0 5.3 5.7 HGB 9.1* 9.0* 8.4* HCT 29.9* 29.6* 28.3*  
 235 239 No results found for: SDES Lab Results Component Value Date/Time Culture result: HEAVY ENTEROCOCCUS SPECIES NOTED (A) 11/27/2019 11:10 AM  
 Culture result: LIGHT YEAST (A) 11/27/2019 11:10 AM  
 Culture result: NO GROWTH 5 DAYS 11/12/2019 11:18 AM  
 Culture result: SERRATIA MARCESCENS (A) 10/31/2019 10:05 AM  
 Culture result: SERRATIA MARCESCENS (2ND COLONY TYPE/STRAIN) (A) 10/31/2019 10:05 AM  
 Culture result: ENTEROCOCCUS FAECALIS GROUP D (A) 10/31/2019 10:05 AM  
 
Recent Results (from the past 24 hour(s)) POC G3 - PUL Collection Time: 12/04/19  7:21 AM  
Result Value Ref Range pH (POC) 7.413 7.35 - 7.45    
 pCO2 (POC) 42.3 35.0 - 45.0 MMHG  
 pO2 (POC) 76 (L) 80 - 100 MMHG  
 HCO3 (POC) 27.0 (H) 22 - 26 MMOL/L  
 sO2 (POC) 95 92 - 97 % Base excess (POC) 2 mmol/L Site RIGHT RADIAL Device: NASAL CANNULA Flow rate (POC) 2 L/M Allens test (POC) YES Specimen type (POC) ARTERIAL    
GLUCOSE, POC Collection Time: 12/04/19  7:30 AM  
Result Value Ref Range Glucose (POC) 96 65 - 100 mg/dL Performed by Jorge Blood ECHO ADULT COMPLETE Collection Time: 12/04/19  9:29 AM  
Result Value Ref Range LVIDd 5.49 4.2 - 5.9 cm  
 LVPWd 1.25 (A) 0.6 - 1.0 cm LVIDs 4.66 cm IVSd 0.93 0.6 - 1.0 cm  
 LV Mass .0 (A) 88 - 224 g LV Mass AL Index 132.6 49 - 115 g/m2 Tapse 1.58 1.5 - 2.0 cm Triscuspid Valve Regurgitation Peak Gradient 33.0 mmHg  
 TR Max Velocity 287.03 cm/s Left Ventricular Fractional Shortening by 2D [id number] % LACTIC ACID Collection Time: 12/04/19  9:33 AM  
Result Value Ref Range Lactic acid 1.0 0.4 - 2.0 MMOL/L  
TROPONIN I Collection Time: 12/04/19  9:33 AM  
Result Value Ref Range Troponin-I, Qt. 0.08 (H) <0.05 ng/mL CK W/ CKMB & INDEX Collection Time: 12/04/19  9:33 AM  
Result Value Ref Range CK 23 (L) 39 - 308 U/L  
 CK - MB 2.0 <3.6 NG/ML  
 CK-MB Index 8.7 (H) 0.0 - 2.5 EKG, 12 LEAD, INITIAL Collection Time: 12/04/19  9:50 AM  
Result Value Ref Range Ventricular Rate 75 BPM  
 Atrial Rate 89 BPM  
 QRS Duration 108 ms Q-T Interval 438 ms QTC Calculation (Bezet) 489 ms Calculated R Axis 13 degrees Calculated T Axis 66 degrees Diagnosis Electronic ventricular pacemaker When compared with ECG of 26-NOV-2019 14:35, 
Vent. rate has decreased BY  26 BPM 
Confirmed by Constantino Vega M.D., [facility] ([id number]) on 12/4/2019 12:43:57 PM 
  
POC G3 - PUL Collection Time: 12/04/19 11:09 AM  
Result Value Ref Range pH (POC) 7.465 (H) 7.35 - 7.45    
 pCO2 (POC) 39.1 35.0 - 45.0 MMHG  
 pO2 (POC) 91 80 - 100 MMHG  
 HCO3 (POC) 28.1 (H) 22 - 26 MMOL/L  
 sO2 (POC) 98 (H) 92 - 97 % Base excess (POC) 4 mmol/L Site RIGHT RADIAL Device: CPAP    
 PEEP/CPAP (POC) 9 cmH2O Allens test (POC) YES Bleed In 4 L/min Specimen type (POC) ARTERIAL    
GLUCOSE, POC Collection Time: 12/04/19 12:13 PM  
Result Value Ref Range Glucose (POC) 80 65 - 100 mg/dL Performed by PRISCILLA COLE   
PROCALCITONIN Collection Time: 12/04/19  1:01 PM  
Result Value Ref Range Procalcitonin 0.10 ng/mL GLUCOSE, POC  
 Collection Time: 12/04/19  4:24 PM  
Result Value Ref Range Glucose (POC) 100 65 - 100 mg/dL Performed by PRISCILLA COLE   
GLUCOSE, POC Collection Time: 12/04/19  9:34 PM  
Result Value Ref Range Glucose (POC) 105 (H) 65 - 100 mg/dL Performed by Sylvia Lyme METABOLIC PANEL, COMPREHENSIVE Collection Time: 12/05/19  3:21 AM  
Result Value Ref Range Sodium 140 136 - 145 mmol/L Potassium 3.0 (L) 3.5 - 5.1 mmol/L Chloride 100 97 - 108 mmol/L  
 CO2 31 21 - 32 mmol/L Anion gap 9 5 - 15 mmol/L Glucose 110 (H) 65 - 100 mg/dL BUN 19 6 - 20 MG/DL Creatinine 1.52 (H) 0.70 - 1.30 MG/DL  
 BUN/Creatinine ratio 13 12 - 20 GFR est AA 55 (L) >60 ml/min/1.73m2 GFR est non-AA 46 (L) >60 ml/min/1.73m2 Calcium 9.2 8.5 - 10.1 MG/DL Bilirubin, total 1.1 (H) 0.2 - 1.0 MG/DL  
 ALT (SGPT) 7 (L) 12 - 78 U/L  
 AST (SGOT) 11 (L) 15 - 37 U/L Alk. phosphatase 106 45 - 117 U/L Protein, total 6.6 6.4 - 8.2 g/dL Albumin 2.9 (L) 3.5 - 5.0 g/dL Globulin 3.7 2.0 - 4.0 g/dL A-G Ratio 0.8 (L) 1.1 - 2.2 MAGNESIUM Collection Time: 12/05/19  3:21 AM  
Result Value Ref Range Magnesium 2.3 1.6 - 2.4 mg/dL CBC W/O DIFF Collection Time: 12/05/19  3:21 AM  
Result Value Ref Range WBC 5.0 4.1 - 11.1 K/uL  
 RBC 3.07 (L) 4.10 - 5.70 M/uL HGB 9.1 (L) 12.1 - 17.0 g/dL HCT 29.9 (L) 36.6 - 50.3 % MCV 97.4 80.0 - 99.0 FL  
 MCH 29.6 26.0 - 34.0 PG  
 MCHC 30.4 30.0 - 36.5 g/dL  
 RDW 17.6 (H) 11.5 - 14.5 % PLATELET 285 788 - 769 K/uL MPV 9.0 8.9 - 12.9 FL  
 NRBC 0.0 0  WBC ABSOLUTE NRBC 0.00 0.00 - 0.01 K/uL PROTHROMBIN TIME + INR Collection Time: 12/05/19  3:21 AM  
Result Value Ref Range INR 2.8 (H) 0.9 - 1.1 Prothrombin time 27.2 (H) 9.0 - 11.1 sec PTT Collection Time: 12/05/19  3:21 AM  
Result Value Ref Range  aPTT 40.4 (H) 22.1 - 32.0 sec  
 aPTT, therapeutic range     58.0 - 77.0 SECS  
DIGOXIN  
 Collection Time: 12/05/19  3:21 AM  
Result Value Ref Range Digoxin level 1.4 0.90 - 2.00 NG/ML  
LD Collection Time: 12/05/19  3:21 AM  
Result Value Ref Range  (H) 85 - 241 U/L  
NT-PRO BNP Collection Time: 12/05/19  3:21 AM  
Result Value Ref Range NT pro-BNP 7,514 (H) <125 PG/ML Rizwana Lombardi MD

## 2019-12-05 NOTE — CONSULTS
Consult dictated. 27-year-old with congestive heart failure, status post LVAD who has symptomatic generalized myoclonus. Suspect this to be most likely from toxic/metabolic causes and could be from renal insufficiency, digoxin toxicity, narcotic analgesics, antibiotics etc. we will check CT brain and EEG to rule out a central process. Will try Keppra 250 mg twice daily. If it does not help, low-dose benzodiazepine can be considered.  
Kesah Byrnes MD

## 2019-12-05 NOTE — PROGRESS NOTES
NYHA class IV A/C systolic heart failure Low EF (10%) Inotrope dependent Malnutrition  
LVAD Work-up Epistaxis Hematuria RV dysfunction Fluid overload CVP 10 however PAD in the 40's on cardiomem Started milrinone Started bumex gtt Breathing a little better today Hgb and platelets look good PTT therapeutic INR looks good Still having some jerking motions Having some issues with digoxin toxicity Creatinine bumped a little NT pro-BNP coming down Fairly alkalotic on ABG May need to switch to diamox Plan to decrease Bumex today Went over issues with patient and patient's family CXR - minimal pulmonary Intake/Output Summary (Last 24 hours) at 12/5/2019 1254 Last data filed at 12/5/2019 1200 Gross per 24 hour Intake 756.41 ml Output 3643 ml Net -2886.59 ml Visit Vitals BP (!) 74/56 Pulse 92 Temp 98.3 °F (36.8 °C) Resp 18 Ht 6' 2\" (1.88 m) Wt 162 lb 14.7 oz (73.9 kg) SpO2 (!) 76% BMI 20.92 kg/m² LVAD Pump Speed (RPM): 0623 Pump Flow (LPM): 4.9 MAP: 76 
PI (Pulsitility Index): 3 Power: 4.2  Test: Yes 
Back Up  at Bedside & Labeled: Yes Power Module Test: Yes Driveline Site Care: No 
Driveline Dressing: Clean, Dry, and Intact Outpatient: No 
MAP in Therapeutic Range (Outpatient): Yes Testing Alarms Reviewed: Yes 
Back up SC speed: 5600 Back up Low Speed Limit: 5000 Emergency Equipment with Patient?: Yes Emergency procedures reviewed?: Yes Drive line site inspected?: No 
Drive line intergrity inspected?: Yes Drive line dressing changed?: No 
 
Risk of morbidity and mortality - high Medical decision making - high complexity Total critical care time - 105 minutes (CPT 10681, 99292 x 2)

## 2019-12-05 NOTE — PROGRESS NOTES
Pulmonary / Critical Care Progress Note Name: Billy Ramírez  MRN: [id number]  Date: 2019 Impression / Plan: Hypoxemic respiratory failure with dyspnea 2/2 to continued pulm edema and presumed transudative effusion - also at risk for aspiration - preop (LVAD spirometry with combined obstructive and restrictive physiology - good diuresis with milrinone and bumex gtt Systolic CHF with EF 66% s/p LVAD 19 Mediastinal adenopathy with vocal cord paralysis 
 
parox afib JOSE on cpap 
 
anemia 
 
--agree with pulmicort Renitaloan Mcgarry and duonebs 
--diuresis / bumex gtt per nephrology 
--NIPPV as needed History:          
Up in chair Looks better today on NC O2 
CXR with less edema and no significant recurrent effusion Vital Signs:      
Patient Vitals for the past 4 hrs: 
 Temp Pulse Resp SpO2 Weight 19 0700  97 19 97 %   
19 0647     73.9 kg (162 lb 14.7 oz) 19 0600  81 26 99 %   
19 0500  77 20 97 %   
19 0400 98 °F (36.7 °C) 78 22 97 %  Temp (24hrs), Av °F (36.7 °C), Min:97.4 °F (36.3 °C), Max:98.4 °F (36.9 °C) CVP:     CVP (mmHg): 4 mmHg (19 0700) Intake/Output Summary (Last 24 hours) at 2019 0725 Last data filed at 2019 8632 Gross per 24 hour Intake 681.65 ml Output 5635 ml Net -4953.35 ml Blood Sugar: 
Lab Results Component Value Date/Time Glucose (POC) 99 2019 07:06 AM  
 Glucose (POC) 105 (H) 2019 09:34 PM  
 Glucose (POC) 100 2019 04:24 PM  
 Glucose (POC) 80 2019 12:13 PM  
 Glucose (POC) 96 2019 07:30 AM  
 
 
Physical Exam:     
NC O2 No distress NC AT 
MMM No accessory use Diminished bs at bases, few rales RRR Soft Warm and dry Trace edema Data Review:     
Radiology images independently viewed CXR - decreased edema and no increase in effusion Labs: 
Lab: 
Recent Labs 19 
0321 19 
0933 19 9092 12/03/19 
0327 12/02/19 
1140 WBC 5.0  --  5.3 5.7  --   
HGB 9.1*  --  9.0* 8.4*  --   
  --  235 239  --   
  --  138 135*  --   
K 3.0*  --  4.1 4.9  --   
  --  102 103  --   
CO2 31  --  29 28  --   
BUN 19  --  20 21*  --   
CREA 1.52*  --  1.69* 1.78*  --   
*  --  91 90  --   
CA 9.2  --  9.1 9.1  --   
MG 2.3  --  2.5* 2.5*  --   
INR 2.8*  --  2.5* 2.6*  --   
TROIQ  --  0.08*  --   --   --   
CPK  --  23*  --   --  23* TBILI 1.1*  --  0.9 0.9  --   
SGOT 11*  --  13* 17  --   
LAC  --  1.0  --   --   --   
      
ABG: 
Recent Labs 12/04/19 
1109 12/04/19 
0721 12/03/19 
1720 PHI 7.465* 7.413 7.447 PCO2I 39.1 42.3 36.0 PO2I 91 76* 88  
HCO3I 28.1* 27.0* 24.8 SO2I 98* 95 97 Microbiology: 
Results Procedure Component Value Units Date/Time CULTURE, RESPIRATORY/SPUTUM/BRONCH Dayan Rude STAIN [[id number]]  (Abnormal) Collected:  11/27/19 1110 Order Status:  Completed Specimen:  Sputum Updated:  11/29/19 1325 Special Requests: NO SPECIAL REQUESTS     
  GRAM STAIN FEW WBCS SEEN     
   NO EPITHELIAL CELLS SEEN     
   NO DEFINITE ORGANISM SEEN Culture result:    
  HEAVY ENTEROCOCCUS SPECIES NOTED  
     
   LIGHT YEAST     
 CULTURE, RESPIRATORY/SPUTUM/BRONCH Kipraissa Jamil [[id number]] Collected:  11/25/19 1815 Order Status:  Canceled Specimen:  Sputum URINE CULTURE HOLD SAMPLE [[id number]] Collected:  11/25/19 1500 Order Status:  Completed Specimen:  Urine from Serum Updated:  11/25/19 1647 Urine culture hold URINE ON HOLD IN MICROBIOLOGY DEPT FOR 3 DAYS. IF UNPRESERVED URINE IS SUBMITTED, IT CANNOT BE USED FOR ADDITIONAL TESTING AFTER 24 HRS, RECOLLECTION WILL BE REQUIRED.   
     
  
 
 
  
Dorothey Lundborg, MD

## 2019-12-05 NOTE — PROGRESS NOTES
Problem: Mobility Impaired (Adult and Pediatric) Goal: *Acute Goals and Plan of Care (Insert Text) Description FUNCTIONAL STATUS PRIOR TO ADMISSION: Patient was modified independent using a single point cane for functional mobility. Patient reports an increasingly sedentary lifestyle 2* fatigue and SOB/dyspnea. Retired (so is his wife). Patient reports x 3 falls within the last couple of weeks. Patient is wearing either nasal cannula or CPAP at night. LVAD work-up has been initiated. HOME SUPPORT PRIOR TO ADMISSION: The patient lived with his wife, but did not require physical assistance. Physical Therapy Goals: 
 
Re-evaluation completed 11/20/2019 and new goals formulated following LVAD implantation. Reviewed and cont 12/3/2019 1. Patient will move from supine to sit and sit to supine, scoot up and down and roll side to side in bed with minimal assistance/contact guard assist within 7 days. 2.  Patient will perform sit to/from stand with minimal assistance/contact guard assist within 7 days. 3.  Patient will ambulate 150 feet with least restrictive assistive device and minimal assistance/contact guard assist within 7 days. 4.  Patient will ascend/descend 4 stairs with handrail(s) with minimal assistance/contact guard assist within 7 days. 5.  Patient will perform cardiac exercises per protocol with supervision within 7 days. 6.  Patient will verbally and functionally recall 3/3 sternal precautions within 7 days. 7.  Patient will perform power exchange for power module to/from battery with moderate assistance  within 7 days. Initiated 10/27/2019; updated 11/15/2019 1. Patient will move from supine to sit and sit to supine, scoot up and down and roll side to side in bed with independence within 7 days. 2.  Patient will perform sit to/from stand with supervision/set-up within 7 days.  
3.  Patient will ambulate 200 feet with least restrictive assistive device and supervision/set-up within 7 days. 4.  Patient will ascend/descend 4 stairs with  handrail(s) with supervision/set-up within 7 days for functional strengthening and community reintegration. Lilia Tomas 5.  Patient will verbally and functionally recall 3/3 sternal precautions within 7 days in preparation for LVAD implantation. 6.  Patient will perform a mock power exchange for power module to/from battery with supervision/set-up within 7 days in preparation for LVAD implantation. 1.  Patient will move from supine to sit and sit to supine, scoot up and down and roll side to side in bed with independence within 7 days. 2.  Patient will perform sit to/from stand with supervision/set-up within 7 days. 3.  Patient will ambulate 150 feet with least restrictive assistive device and supervision/set-up within 7 days. 4.  Patient will ascend/descend 4 stairs with  handrail(s) with supervision/set-up within 7 days for functional strengthening and community reintegration. Lilia Tomas 5.  Patient will verbally and functionally recall 3/3 sternal precautions within 7 days in preparation for LVAD implantation. 6.  Patient will perform a mock power exchange for power module to/from battery with supervision/set-up within 7 days in preparation for LVAD implantation. Outcome: Progressing Towards Goal 
 
PHYSICAL THERAPY TREATMENT: WEEKLY REASSESSMENT Patient: Zay Wheeler (75 y.o. male) Date: 12/5/2019 Primary Diagnosis: Acute decompensated heart failure ([facility] [address] [I50.9] Procedure(s) (LRB): LEFT BRACHIAL THROMBECTOMY (Left) 10 Days Post-Op Precautions: LVAD Fall, Sternal 
 
 
ASSESSMENT Patient continues with skilled PT services and is progressing towards goals. Patient re-assessed this date following transition back to ICU for respiratory distress.  Progress towards goals have been limited by LE weakness and balance not further complicated by confusion and systemic tremors. Goals remain appropriate going forward but progress has been slow since last re-assessment. Facilitated sitting on EOB with good patient participation but c/o SOB and strong fatigue at rest exacerbated with any activity. Unable to acquire an SpO2 reading with any of 3 different types of sensors, though ABGs good per nursing. Deferred attempts to transfer to bedside chair at the time of treatment to day d/t fatigue and the severity of observed tremors with exertion. Would recommend discharge to an IP rehab setting for this motivated patient once his medical status stabilizes. PLAN : 
Goals have been updated based on progression since last assessment. Patient continues to benefit from skilled intervention to address the above impairments. Recommendations and Planned Interventions: bed mobility training, transfer training, gait training, therapeutic exercises, and neuromuscular re-education Frequency/Duration: Patient will be followed by physical therapy:  daily to address goals. Recommendation for discharge: (in order for the patient to meet his/her long term goals) Therapy 3 hours per day 5-7 days per week IF patient discharges home will need the following DME: to be determined (TBD) SUBJECTIVE:  
Patient stated I am just SO tired.  OBJECTIVE DATA SUMMARY:  
HISTORY:   
Past Medical History:  
Diagnosis Date Degenerative disc disease, lumbar Heart failure ([address] High cholesterol Hypertension Paroxysmal atrial fibrillation ([address] 4/2/2019 Spinal stenosis Past Surgical History:  
Procedure Laterality Date COLONOSCOPY Left 11/11/2019 COLONOSCOPY at bedside performed by Lesli Watkins MD at [address] HX CORONARY ARTERY BYPASS GRAFT    
 triple HX HERNIA REPAIR    
 HX IMPLANTABLE CARDIOVERTER DEFIBRILLATOR    
 PA CARDIOVERSION ELECTIVE ARRHYTHMIA EXTERNAL N/A 6/10/2019 EP CARDIOVERSION performed by Dottie Stoll MD at [address], [facility]s Dr CATH LAB  
 CT CARDIOVERSION ELECTIVE ARRHYTHMIA EXTERNAL N/A 6/18/2019 EP CARDIOVERSION performed by Georgeanna Cooks, MD at [address], [facility]s Dr CATH LAB  
 CT INSJ/RPLCMT PERM DFB W/TRNSVNS LDS 1/DUAL [facility] N/A 6/21/2019 INSERT ICD BIV MULTI performed by Carl Chandler MD at [address], [facility]s  CATH LAB  
 CT TCAT IMPL WRLS P-ART PRS SNR L-T HEMODYN MNTR N/A 9/18/2019 IMPLANT HEART FAILURE MONITORING DEVICE performed by Clara Jackson MD [facility] [address]s  CATH LAB Personal factors and/or comorbidities impacting plan of care:  
 
Home Situation Home Environment: Private residence # Steps to Enter: 0 One/Two Story Residence: One story Living Alone: No 
Support Systems: Spouse/Significant Other/Partner Patient Expects to be Discharged to[de-identified] Unknown Current DME Used/Available at Home: Cane, straight, Walker, rolling, CPAP Tub or Shower Type: Shower EXAMINATION/PRESENTATION/DECISION MAKING:  
Critical Behavior: 
Neurologic State: Alert, Appropriate for age Orientation Level: Oriented X4 Cognition: Appropriate decision making, Appropriate for age attention/concentration, Appropriate safety awareness, Follows commands Safety/Judgement: Awareness of environment Hearing: Auditory Auditory Impairment: None Functional Mobility: 
Bed Mobility: 
  
Supine to Sit: Moderate assistance Sit to Supine: Moderate assistance Scooting: Minimum assistance Scooting to [facility] with minimal assist and additional time, moderate cues requiredto maintain sternal precautions Transfers: 
  
  
     
  
     
  
  
Balance:  
Sitting: Impaired Sitting - Static: Fair (occasional) Sitting - Dynamic: Fair (occasional) Activity Tolerance:  
Poor Please refer to the flowsheet for vital signs taken during this treatment. After treatment patient left in no apparent distress:  
Supine in bed COMMUNICATION/EDUCATION:  
 The patients plan of care was discussed with: Registered Nurse. Fall prevention education was provided and the patient/caregiver indicated understanding., Patient/family have participated as able in goal setting and plan of care. , and Patient/family agree to work toward stated goals and plan of care. Thank you for this referral. 
Darell Azevedo, PT, DPT Time Calculation: 21 mins

## 2019-12-05 NOTE — PROGRESS NOTES
1930 - Bedside and Verbal shift change report given to Lennox Mormon, RN (oncoming nurse) by Tommye Holstein, RN (offgoing nurse). Report included the following information SBAR, Kardex, Intake/Output, MAR, Recent Results, Cardiac Rhythm Paced and Alarm Parameters . Medications verified and pt assessed. Pt resting in bed. Pt states, \"I am breathing a lot better today. \" 
2115 - Pt states, \"I think I'm ready for bed. We can try the cpap now. \"  Pt placed on home cpap mask with 2L o2 bled into cpap.   
0000 - Pt continues to tolerate home cpap, occasionally requests to take breaks for water swabs to moisten mouth while pt is NPO. No LVAD issues at this time. 0500 - K = 3.0 this AM.  Replacement of electrolytes as needed. 0600 - Pt daughter in [facility], on telephone. Updated on pt condition overnight. Opportunity for questions and concerns provided. 0700 - Dr. Irvin Edwards, nephrology, at bedside. Updated on pt condition overnight. MD placed new orders - MD states to decrease bumex gtt to 0.25mg/hr at 1600. 
0730 - Bedside and Verbal shift change report given to Klarissa Hdz RN and Sahara Bacon RN(oncoming nurse) by Lennox Mormon, RN (offgoing nurse). Report included the following information SBAR, Kardex, Intake/Output, MAR, Recent Results, Cardiac Rhythm Paced and Alarm Parameters .

## 2019-12-05 NOTE — PROGRESS NOTES
ID Progress Note 
2019 Subjective:  
 
Quite weak today--fluid issues--in ICU setting--worsening mental state over the course of the day Objective: Antibiotics: 1. Levaquin 2. Rifampin 3. Fluconazole 4. Vancomycin 5. Cefazolin Vitals:  
Visit Vitals BP (!) 74/56 Pulse 87 Temp 98.3 °F (36.8 °C) Resp 27 Ht 6' 2\" (1.88 m) Wt 73.9 kg (162 lb 14.7 oz) SpO2 (!) 88% BMI 20.92 kg/m² Tmax:  Temp (24hrs), Av.2 °F (36.8 °C), Min:97.9 °F (36.6 °C), Max:98.4 °F (36.9 °C) Exam:  Lungs:  clear to auscultation bilaterally Heart:  regular rate and rhythm Abdomen:  soft, non-tender. Bowel sounds normal. No masses,  no organomegaly Urine is clearing up Labs:     
Recent Labs 19 
1036 19 
0321 19 
0315 19 
0327 WBC  --  5.0 5.3 5.7 HGB  --  9.1* 9.0* 8.4* PLT  --  203 235 239 BUN 20 19 20 21* CREA 1.92* 1.52* 1.69* 1.78* SGOT  --  11* 13* 17  
AP  --  106 115 103 TBILI  --  1.1* 0.9 0.9 Cultures: No results found for: [facility] Lab Results Component Value Date/Time Culture result: HEAVY ENTEROCOCCUS SPECIES NOTED (A) 2019 11:10 AM  
 Culture result: LIGHT YEAST (A) 2019 11:10 AM  
 Culture result: NO GROWTH 5 DAYS 2019 11:18 AM  
 
 
Radiology:  
 
Line/Insert Date:        
 
Assessment:  
 
1. UTI--Serratia (2 biotypes) from culture and small amount of Enterococcus 2. CHF/cardiomyopathy 3. ?aspiration event(s) 4. Renal insufficiency Objective: 1. Adjust antibiotic therapy Vikash Andino MD

## 2019-12-05 NOTE — PROGRESS NOTES
Problem: Falls - Risk of 
Goal: *Absence of Falls Description Document Ricardo Acuna Fall Risk and appropriate interventions in the flowsheet. Outcome: Progressing Towards Goal 
Note: Fall Risk Interventions: 
Mobility Interventions: Communicate number of staff needed for ambulation/transfer Mentation Interventions: Adequate sleep, hydration, pain control, Door open when patient unattended, Evaluate medications/consider consulting pharmacy, Eyeglasses and hearing aids, Increase mobility, More frequent rounding, Reorient patient Medication Interventions: Evaluate medications/consider consulting pharmacy Elimination Interventions: Call light in reach, Patient to call for help with toileting needs, Toileting schedule/hourly rounds History of Falls Interventions: Consult care management for discharge planning Problem: Diabetes Self-Management Goal: *Using medications safely Description State effect of diabetes medications on diabetes; name diabetes medication taking, action and side effects. Outcome: Progressing Towards Goal 
Goal: *Monitoring blood glucose, interpreting and using results Description Identify recommended blood glucose targets  and personal targets. Outcome: Progressing Towards Goal 
Goal: *Prevention, detection, treatment of acute complications Description List symptoms of hyper- and hypoglycemia; describe how to treat low blood sugar and actions for lowering  high blood glucose level. Outcome: Progressing Towards Goal 
  
Problem: Pressure Injury - Risk of 
Goal: *Prevention of pressure injury Description Document Mich Scale and appropriate interventions in the flowsheet.  
Outcome: Progressing Towards Goal 
Note: Pressure Injury Interventions: 
Sensory Interventions: Assess changes in LOC, Assess need for specialty bed, Avoid rigorous massage over bony prominences, Chair cushion, Check visual cues for pain, Maintain/enhance activity level, Keep linens dry and wrinkle-free, Minimize linen layers, Monitor skin under medical devices, Pad between skin to skin, Pressure redistribution bed/mattress (bed type), Sit a 90-degree angle/use footstool if needed, Suspension boots, Turn and reposition approx. every two hours (pillows and wedges if needed), Use 30-degree side-lying position Moisture Interventions: Absorbent underpads, Apply protective barrier, creams and emollients, Limit adult briefs, Maintain skin hydration (lotion/cream), Minimize layers, Moisture barrier, Offer toileting Q_hr Activity Interventions: Pressure redistribution bed/mattress(bed type), Increase time out of bed, PT/OT evaluation Mobility Interventions: Assess need for specialty bed, Pressure redistribution bed/mattress (bed type), Turn and reposition approx. every two hours(pillow and wedges), PT/OT evaluation Nutrition Interventions: Document food/fluid/supplement intake, Discuss nutritional consult with provider Friction and Shear Interventions: Apply protective barrier, creams and emollients, HOB 30 degrees or less, Lift sheet, Lift team/patient mobility team, Minimize layers, Transferring/repositioning devices Problem: Nutrition Deficit Goal: *Optimize nutritional status Outcome: Progressing Towards Goal 
  
Problem: Patient Education: Go to Patient Education Activity Goal: Patient/Family Education Outcome: Progressing Towards Goal 
  
Problem: LVAD Post-op Day 15 to Discharge Goal: Activity/Safety Outcome: Progressing Towards Goal 
Goal: Nutrition/Diet Outcome: Progressing Towards Goal 
Goal: Medications Outcome: Progressing Towards Goal 
Goal: Respiratory Outcome: Progressing Towards Goal 
Goal: Treatments/Interventions/Procedures Outcome: Progressing Towards Goal 
Goal: Psychosocial 
Outcome: Progressing Towards Goal 
  
Problem: Impaired Skin Integrity/Pressure Injury Treatment Goal: *Improvement of Existing Pressure Injury Outcome: Progressing Towards Goal

## 2019-12-05 NOTE — PROGRESS NOTES
[address] [place] [address] in [place], [place] Inpatient Progress Note Patient name: Aydee Diaz Patient : 1950 Patient MRN: [id number] Attending MD: Soledad Sosa MD 
Date of service: 19 Chief Complaint:  
Haris Croft azucena LVAD implant 
  
HPI: Sona Kiser is a 71y.o. year old pleasant white male with a history of HTN, HLD, JOSE, CAD s/p cardiac arrest VFib s/p CABG () c/b sternal would infection and sternectomy, ischemic cardiomyopathy LVEF 15-20%, s/p ICD and with LBBB. He was admitted with acute on chronic systolic heart failure with massive volume overload > 20 lbs, in the setting of atrial fibrillation s/p failed DCCV and underwent DCCV and RHC on .  S/p BiVICD on 19 with Dr. Odilon Rogers. He was discharged home home on IV milrinone on 19. [facility] has been followed closely by Dr. Sofie Keenan and the [facility]. 
  
Mr. Kiser was admitted for acute on chronic systolic heart failure. He underwent implantation of Impella 5.0 due to CS and has completed his LVAD evaluation. [facility] meets criteria for implant of HM3 as DT. He was NYHA Class IV prior to implant of Impella 5.0, has LVEF < 15%, was intolerant of GDMT due to symptomatic hypotension and renal dysfunction. He remains dependent on temporary MCS support. RHC  revealed compensated hemodynamics on Impella. His renal function has improved dramatically with improvement in his hemodynamics. He is not a suitable transplant candidate due to single kidney, sternectomy, debility, and frailty. He was reviewed by Eric Eaton and felt to be a high risk heart transplant candidate due to multiple co-morbidities as well as the afore mentioned conditions.  He remained in the CCU and underwent a HM 3 implant as DT on . He was weaned off of pressors and transferred to [place] [address] where he continues to undergo PT/OT and hemodynamic optimization.   
  
24Hr Events Excellent diuresis Adequate VAD flows Cr slightly worse Myoclonus is worse  
  
Procedure:  Procedure(s): REMOVAL TEMPORARY LEFT VENTRICULAR ASSIST DEVICE, IMPLANTATION OF LEFT VENTRICULAR ASSIST DEVICE PERMANENT (VAD), ECC, JACQUE, EPI AORTIC US BY DR Mame Arroyo.    
POD:  16 
  
Impression / Plan:  
Heart Failure Status: NYHA Class IV INTERMACS Category 2 
  
Chronic systolic heart failure due to ICM, NYHA Class IV, EF < 15%, cardiogenic shock bridged with Impella 5.0 support to HM 3 implant S/p Impella removal and LVAD implant Continue RPMs at 5600 - LVIDd down to 5.3 cm  
TTE with bubble study negative for PFO Continue milrinone 0.2 mcg/kg/min Discontinue Bumex gtt - resume intermittent 1 mg IV BID once patient taking PO Unable to obtain CardioMEMS reading d/t interference Cont Sildenafil 20 mg PO TID - rx sent to local pharmacy to initiate PA No AA/ARB/ARNI post op- will start as appropriate Strict I/O, daily weights, Na+ restricted diet Continue LVAD education Daily dressing changes Generalized myoclonus Appreciate Neurology input Begin Keppra EEG, head CT pending Monitor closely Remain in CVICU 
  
Anticoagulation for LVAD, INR goal 2-3 Continue ASA Cont coumadin tonight 2 mg 
  
Acute Respiratory Failure post op Improved with bumex gtt, milrinone gtt CXR and O2 requirement improved TTE with Bubble study negative for PFO Pulmonary Consult appreciated Pulmonary hygiene Cont home CPAP- must use while sleeping  
  
Post op Pain PRN Tylenol Comfort measures  
  
L Brachial Artery Thrombosis s/p thrombectomy Surgical management per Dr. Suraj Rojo On warfarin, INR at goal 
Pain improved  
  
Swelling, pain of right arm, acute Doppler studies (-) for thrombus  
Continue AC Confirmed PICC placement 
  
CKD 3, atrophic left kidney Appreciate Nephrology assistance Assess diuretic dosing daily IV milrinone Decrease Bumex to intermittent dosing Diamox x1 today Avoid nephrotoxins Trend labs ProNTBNP remains elevated 
  
CAD s/p CABG Cont low dose ASA- watch platelets No BB d/t RV failure Hold statin due to recent hepatic failure LHC performed 11/13 - low likelihood of benefit from revascularization  
  
Hx of VF arrest s/p BiV-ICD No recent shocks Keep K > 4 and Mg > 2  
   
PAF Currently in ST, Paced Hold digoxin - level 1.4 today - do not resume until < 1 Repeat TFTs WNL 
  
Hypokalemia IV repletion until taking PO 
  
Hx of gout Uric acid WNL 
  
Suspected aspiration pneumonia RUL consolidation Suspect aspiration related to over sedation Limit sedatives Sputum culture Zosyn NPO until seen by SLP and ENT  
  
Urinary retention, c/b serratia UTI and hematuria Appreciate Urology consult Cystoscopy performed 11/13 shows catheter induced cystitis Repeat UA shows resolution of UTI  
  
Malnutrition Appreciate Nutritionist consult Repeat prealbumin Continue Marinol 5 mg PO BID 
6 small meals daily once taking PO Hold mirtazapine d/t tremors, confusion  
  
COPD Appreciate Pulmonology assistance Continue nebs PRN   
PFTs complete 
  
Anemia Multifactorial, due to hemolysis + epistaxis + hematuria Intolerant of octreotide or doxycycline for AVM ppx due to prolonged QTc Transfuse for Hgb goal > 8 
  
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  
  
Hx of sternal wound infection, sternectomy Sternum closed post op- monitor  
  
Vocal cord paralysis Improved ENT consult pending Speech therapy following - appreciate input 
  
Debility Continue PT/OT  
  
Ppx Protonix PT/OT Post-op abx complete Bowel regimen Cont Mechanical soft PICC placed 
  
Dispo: 
Likely will need IP rehab. Remain in CVICU for now. LVAD INTERROGATION: 
Device interrogated in person Device function normal, normal flow, no events LVAD Pump Speed (RPM): 3583 Pump Flow (LPM): 4.9 MAP: 70 
PI (Pulsitility Index): 3.1 Power: 4.3  Test: Yes 
 Back Up  at Bedside & Labeled: Yes Power Module Test: Yes Driveline Site Care: No 
Driveline Dressing: Clean, Dry, and Intact Outpatient: No 
MAP in Therapeutic Range (Outpatient): Yes Testing Alarms Reviewed: Yes 
Back up SC speed: 5600 Back up Low Speed Limit: 5000 Emergency Equipment with Patient?: Yes Emergency procedures reviewed?: Yes Drive line site inspected?: No 
Drive line intergrity inspected?: Yes Drive line dressing changed?: No 
 
PHYSICAL EXAM: 
Visit Vitals BP (!) 74/56 Pulse 93 Temp 98.3 °F (36.8 °C) Resp 20 Ht 6' 2\" (1.88 m) Wt 162 lb 14.7 oz (73.9 kg) SpO2 (!) 86% BMI 20.92 kg/m² General: Patient is well developed, well-nourished in no acute distress. Generalized myoclonus. HEENT: Normocephalic and atraumatic. No scleral icterus. Pupils are equal, round and reactive to light and accomodation. No conjunctival injection. Oropharynx is clear. Lips are dry. Neck: Supple. No evidence of thyroid enlargements or lymphadenopathy. JVD: Cannot be appreciated Lungs: Breath sounds are equal and clear bilaterally. No wheezes, rhonchi, or rales. Heart: Regular rate and rhythm with normal S1 and S2. No murmurs, gallops or rubs. Abdomen: Soft, no mass or tenderness. No organomegaly or hernia. Bowel sounds present. Genitourinary and rectal: deferred Extremities: No cyanosis, clubbing, or edema. Myoclonus. Neurologic: No focal sensory or motor deficits are noted. Grossly intact. Psychiatric: Lethargic. Skin: Warm, dry and well perfused. No lesions, nodules or rashes are noted. REVIEW OF SYSTEMS: 
General: Denies fever, night sweats. Ear, nose and throat: Denies difficulty hearing, sinus problems, runny nose, post-nasal drip, ringing in ears, mouth sores, loose teeth, ear pain, nosebleeds, sore throate, facial pain or numbess Cardiovascular: see above in the interval history Respiratory: Reports dyspnea Gastrointestinal: Denies heartburn, constipation, intolerance to certain foods, diarrhea, abdominal pain, nausea, vomiting, difficulty swallowing, blood in stool Kidney and bladder: Denies painful urination, frequent urination, urgency, prostate problems and impotence Musculoskeletal: Denies joint pain, muscle weakness Skin and hair: Denies change in existing skin lesions, hair loss or increase, breast changes PAST MEDICAL HISTORY: 
Past Medical History:  
Diagnosis Date  Degenerative disc disease, lumbar  Heart failure ([facility] [address]  High cholesterol  Hypertension  Paroxysmal atrial fibrillation ([facility] [address] 4/2/2019  Spinal stenosis PAST SURGICAL HISTORY: 
Past Surgical History:  
Procedure Laterality Date  COLONOSCOPY Left 11/11/2019 COLONOSCOPY at bedside performed by Lesli Watkins MD at [address] [facility] [address]  HX CORONARY ARTERY BYPASS GRAFT    
 triple  HX HERNIA REPAIR    
 HX IMPLANTABLE CARDIOVERTER DEFIBRILLATOR  [place] CARDIOVERSION ELECTIVE ARRHYTHMIA EXTERNAL N/A 6/10/2019 EP CARDIOVERSION performed by John Santamaria MD at [place] [address], [address] CATH LAB  [place] CARDIOVERSION ELECTIVE ARRHYTHMIA EXTERNAL N/A 6/18/2019 EP CARDIOVERSION performed by Gurpreet Gavin MD at [place] [address], [address] CATH LAB  [place] INSJ/RPLCMT PERM DFB W/TRNSVNS LDS 1/DUAL CHMBR N/A 6/21/2019 INSERT ICD BIV MULTI performed by Lorena Aranda MD at [place] [address], [facility] CATH LAB  [place] TCAT IMPL WRLS P-ART PRS SNR L-T HEMODYN MNTR N/A 9/18/2019 IMPLANT HEART FAILURE MONITORING DEVICE performed by Rey Bahena MD at [place] [address], [address] CATH LAB FAMILY HISTORY: 
Family History Problem Relation Age of Onset  Lung Disease Mother  Hypertension Mother [facility] Arthritis-osteo Mother  Heart Disease Mother  Heart Disease Father  Diabetes Father SOCIAL HISTORY: 
Social History Socioeconomic History  Marital status:  Spouse name: Not on file  Number of children: Not on file  Years of education: Not on file  Highest education level: Not on file Tobacco Use  Smoking status: Former Smoker Last attempt to quit: 2010 Years since quittin.0  Smokeless tobacco: Never Used Substance and Sexual Activity  Alcohol use: Not Currently Comment: rarely  Drug use: Never Other Topics Concern LABORATORY RESULTS: 
  
Labs Latest Ref Rng & Units 2019 2019 2019 12/3/2019 2019 2019 2019 WBC 4.1 - 11.1 K/uL - 5.0 5.3 5.7 5.8 7.4 7.3 RBC 4.10 - 5.70 M/uL - 3.07(L) 3.00(L) 2.84(L) 2.63(L) 2.43(L) 2.82(L) Hemoglobin 12.1 - 17.0 g/dL - 9. 1(L) 9.0(L) 8.4(L) 8.0(L) 7. 3(L) 8.4(L) Hematocrit 36.6 - 50.3 % - 29. 9(L) 29. 6(L) 28. 3(L) 26. 2(L) 24. 0(L) 27. 9(L) MCV 80.0 - 99.0 FL - 97.4 98.7 99. 6(H) 99. 6(H) 98.8 98.9 Platelets 932 - 105 K/uL - 203 235 239 241 265 244 Lymphocytes 12 - 49 % - - - - - - - Monocytes 5 - 13 % - - - - - - - Eosinophils 0 - 7 % - - - - - - - Basophils 0 - 1 % - - - - - - - Albumin 3.5 - 5.0 g/dL - 2. 9(L) 2. 7(L) 2. 4(L) 2. 6(L) 2. 6(L) 2. 4(L) Calcium 8.5 - 10.1 MG/DL 9.6 9.2 9.1 9.1 9.0 8.8 8.7 SGOT 15 - 37 U/L - 11(L) 13(L) 17 15 14(L) 15 Glucose 65 - 100 mg/dL 116(H) 110(H) 91 90 91 92 118(H) BUN 6 - 20 MG/DL 20 19 20 21(H) 21(H) 20 19 Creatinine 0.70 - 1.30 MG/DL 1.92(H) 1.52(H) 1.69(H) 1.78(H) 1.70(H) 1.65(H) 1.55(H) Sodium 136 - 145 mmol/L 140 140 138 135(L) 135(L) 133(L) 134(L) Potassium 3.5 - 5.1 mmol/L 4.6 3. 0(L) 4.1 4.9 5.1 4.5 4.1 TSH 0.36 - 3.74 uIU/mL - - - 2.30 - - -  
PSA 0.01 - 4.0 ng/mL - - - - - - -  
LDH 85 - 241 U/L - 249(H) 286(H) 348(H) 271(H) 271(H) 256(H) CEA ng/mL - - - - - - - Some recent data might be hidden Lab Results Component Value Date/Time TSH 2.30 2019 03:29 AM  
 TSH 2.40 10/25/2019 07:39 PM  
 TSH 2.45 2019 04:16 AM  
 
 
ALLERGY: 
No Known Allergies CURRENT MEDICATIONS: 
 
 Current Facility-Administered Medications:  
  allopurinol (ZYLOPRIM) tablet 100 mg, 100 mg, Oral, DAILY, Elton Herrera NP, Stopped at 12/05/19 1100   potassium chloride 20 mEq in 50 ml IVPB, 20 mEq, IntraVENous, TID, Elton Herrera NP, Stopped at 12/05/19 1100   ipratropium (ATROVENT) 0.02 % nebulizer solution 0.5 mg, 0.5 mg, Nebulization, Q8H RT, Christiano Andrews MD 
  levETIRAcetam (KEPPRA) tablet 250 mg, 250 mg, Oral, BID, Sangha, Lorn Meigs, MD 
  bumetanide (BUMEX) injection 1 mg, 1 mg, IntraVENous, BID, Elton Herrera NP 
  sildenafil (REVATIO) 2 mg/mL oral suspension 20 mg, 20 mg, Oral, Q8H, Elton Herrera NP, Stopped at 12/05/19 1000   pantoprazole (PROTONIX) 40 mg in 0.9% sodium chloride 10 mL injection, 40 mg, IntraVENous, DAILY, Camilo Herrera NP, 40 mg at 12/05/19 8985 
  aspirin (ASA) suppository 75 mg, 75 mg, Rectal, DAILY, Camilo Herrera NP, 75 mg at 12/05/19 0900 
  arformoterol (BROVANA) neb solution 15 mcg, 15 mcg, Nebulization, BID RT, 15 mcg at 12/05/19 0712 **AND** budesonide (PULMICORT) 500 mcg/2 ml nebulizer suspension, 500 mcg, Nebulization, BID RT, Elton Herrera NP, 500 mcg at 12/05/19 0895   albuterol (PROVENTIL VENTOLIN) nebulizer solution 2.5 mg, 2.5 mg, Nebulization, Q4H RT, Camilo Herrera NP, 2.5 mg at 12/05/19 1122 
  milrinone (PRIMACOR) 20 MG/100 ML D5W infusion, 0.2 mcg/kg/min, IntraVENous, CONTINUOUS, Christiano Andrews MD, Last Rate: 5.2 mL/hr at 12/05/19 0809, 0.2 mcg/kg/min at 12/05/19 2697   piperacillin-tazobactam (ZOSYN) 3.375 g in 0.9% sodium chloride (MBP/ADV) 100 mL, 3.375 g, IntraVENous, Q8H, Juan C Olivares MD, Last Rate: 25 mL/hr at 12/05/19 0824, 3.375 g at 12/05/19 0824 
  magnesium oxide (MAG-OX) tablet 400 mg, 400 mg, Oral, DAILY, Logan Kincaid MD, 400 mg at 12/05/19 0831 
  artificial saliva (MOUTH KOTE) 1 Spray, 1 Spray, Oral, PRN, Elton Herrera, NP 
   lactobac ac& pc-s.therm-b.anim (TIAN Q/RISAQUAD), 1 Cap, Oral, DAILY, Cara Massey MD, Stopped at 12/04/19 0900 
  albumin human 25% (BUMINATE) solution 12.5 g, 12.5 g, IntraVENous, BID, Polliard, Cordell Latus JOHN NP, 12.5 g at 12/05/19 0925 
  oxyCODONE IR (ROXICODONE) tablet 5 mg, 5 mg, Oral, Q6H PRN, Suzanna Davis MD, 5 mg at 12/03/19 7578   balsam peru-castor oil (VENELEX) ointment, , Topical, TID, Mera Andrews MD 
  [Held by provider] tamsulosin (FLOMAX) capsule 0.4 mg, 0.4 mg, Oral, DAILY, Logan Kincaid MD, Stopped at 12/04/19 0900 
  insulin lispro (HUMALOG) injection, , SubCUTAneous, AC&HS, Shana Sims NP, Stopped at 12/02/19 1630   Warfarin MD/NP dosing, , Other, PRN, Mounika Altman MD 
  epoetin yvonne-epbx (RETACRIT) injection 20,000 Units, 20,000 Units, SubCUTAneous, Q7D, Clementina Tillman MD, 20,000 Units at 12/03/19 8941   [Held by provider] lidocaine (LIDODERM) 5 % patch 1 Patch, 1 Patch, TransDERmal, Q24H, Shana Sims NP, 1 Patch at 12/03/19 2158   sodium chloride (NS) flush 5-40 mL, 5-40 mL, IntraVENous, Q8H, Harshal Jane MD, 10 mL at 12/04/19 1400 
  sodium chloride (NS) flush 5-40 mL, 5-40 mL, IntraVENous, PRN, Suzanna Davis MD 
  acetaminophen (TYLENOL) tablet 650 mg, 650 mg, Oral, Q6H PRN, Suzanna Davis MD, 650 mg at 12/03/19 0857 
  naloxone [facility]) injection 0.4 mg, 0.4 mg, IntraVENous, PRN, Suzanna Davis MD 
  ondansetron [facility]) injection 4 mg, 4 mg, IntraVENous, Q4H PRN, Suzanna Davis MD, 4 mg at 11/20/19 2000   [Held by provider] senna-docusate (PERICOLACE) 8.6-50 mg per tablet 1 Tab, 1 Tab, Oral, DAILY, Suzanna Davis MD, Stopped at 12/04/19 0900   [Held by provider] polyethylene glycol (MIRALAX) packet 17 g, 17 g, Oral, DAILY, Suzanna Davis MD, Stopped at 12/04/19 0900 
  bisacodyl (DULCOLAX) tablet 5 mg, 5 mg, Oral, DAILY PRN, Suzanna Davis MD 
   [Held by provider] sucralfate (CARAFATE) tablet 1 g, 1 g, Oral, AC&HS, Nghia Quijano MD, Stopped at 12/04/19 0730 
  influenza vaccine 2019-20 (6 mos+)(PF) (FLUARIX/FLULAVAL/FLUZONE QUAD) injection 0.5 mL, 0.5 mL, IntraMUSCular, PRIOR TO DISCHARGE, Mounika Altman MD 
  hydrALAZINE (APRESOLINE) 20 mg/mL injection 20 mg, 20 mg, IntraVENous, Q6H PRN, Shana Sims NP, 20 mg at 11/19/19 0547 
  dextrose 10 % infusion 125-250 mL, 125-250 mL, IntraVENous, PRN, Pete Alas MD, Stopped at 11/18/19 0700 Thank you for allowing me to participate in this patient's care. Юлия Avila, AGACNP-BC [facility] [address] [place] [address], [address] Phone: [phone] Fax: [phone] [facility] ATTENDING ADDENDUM Patient was seen and examined in person. Data and notes were reviewed. I have discussed and agree with the plan as noted in the NP note above without further additions. Joaquin Blackwood MD PhD 
[facility] [phone] [facility]

## 2019-12-05 NOTE — NURSE NAVIGATOR
HF NN attempted CARDIOMEMS reading. Hospital Electronic Unit (HEU) unable to  adequate signal, most likely due to interference. Jeremie Lozano NP/Dr. Black Aquino notified of inability to get reading. Jeremie Lozano will speak with patient's wife to ask that she bring in patient's CARDIOMEMS home pillow. Unknown if using the pillow will work better with the interference, but will give it a try.

## 2019-12-05 NOTE — PROGRESS NOTES
Bedside and Verbal shift change report given to Shona Shepard  (oncoming nurse) by Eduardo Richter (offgoing nurse). Report included the following information SBAR, Kardex, OR Summary, Procedure Summary, Intake/Output, MAR, Accordion, Recent Results, Med Rec Status and Cardiac Rhythm V paced. 0900 Dr. Refugio Branch bedside will follow up with patient this afternoon for examination of vocal cords with scope. 1000 Patient oxygen sensor difficulty picking up. ABG ordered. 1015 Noted to have multiple PI events on LVAD history page. 21  Per Anabelle Bobo patient is to remain NPO including all medications until ENT follow up. 
 
1100 ABG:   
Lab Results Component Value Date/Time PHI 7.511 (H) 12/05/2019 11:02 AM  
 PCO2I 39.7 12/05/2019 11:02 AM  
 PO2I 167 (H) 12/05/2019 11:02 AM  
 HCO3I 31.7 (H) 12/05/2019 11:02 AM  
 
1130 Per Anabelle Bobo NP, re consult Dr. Johnson Espinoza for myoclonic jerks. 1145 Family/ bedside. 1150 PT and OT bedside. [address] Neurology bedside. 1350 ABG show alkalosis. Per Anabelle Bobo will give a one time dose of Diamox 250 mg IV, decreased Bumex to 0.25 mg. Informed Anabelle Bobo that patient is having leg pain-a one time dose of IV Tylenol given. She would like to recheck BMP at 1600. 
 
1430 EEG bedside. [address] of mid morning labs and Neurology orders. Per Anabelle Bobo she will stop continuous Bumex and go to BID. Will inform Nephrology of Creatinine changes. 535 [facility] B Anabelle Bobo that ENT did not follow up with planned scope. Previous order to hold all medications and food/liquids. Verified regarding holding Coumadin tonight. Per Anabelle Bobo, give Coumadin with applesauce. K was 3.7 and will hold Bumex per Anabelle Bobo. Per Anabelle Bobo order to given  ml over six hours. [id number] Paged nephrology regarding Creatinine trending up. Spoke to Dr. Andrea Mosquera who ordered Albumin 25 in six hours. Order placed in [facility] 
 
1700 LVAD dressing changed. 1910 after giving patient Applesauce with Coumadin.  Patient coughing and unable to clear throat. Patient does not sound any different that he did this morning. Per Abraham Boast okay to CXR. 1940 Bedside and Verbal shift change report given to Kennedy Morales (oncoming nurse) by Santhosh Hussein (offgoing nurse). Report included the following information SBAR, Kardex, OR Summary, Procedure Summary, Intake/Output, MAR, Accordion, Recent Results, Med Rec Status and Cardiac Rhythm paced with atrial fibrillation.

## 2019-12-05 NOTE — PROGRESS NOTES
Problem: Self Care Deficits Care Plan (Adult) Goal: *Acute Goals and Plan of Care (Insert Text) Description FUNCTIONAL STATUS PRIOR TO ADMISSION: Patient was modified independent using a single point cane for functional mobility. Patient able to shower and dress himself. However, patient required assistance for household management from his wife. HOME SUPPORT: The patient lived alone with wife to provide assistance. Occupational Therapy Goals: OT weekly reassessment 11/29/19: goals modified below 1. Patient will perform upper body dressing including LVAD switchovers with moderate assistance within 7 day(s). 2.  Patient will perform lower body dressing with minimal assistance within 7 day(s). 3.  Patient will perform grooming in standing with minimum assistance within 7 day(s). 4.  Patient will perform toilet transfers with minimum assistance within 7 day(s). 5.  Patient will perform all aspects of toileting with minimal assistance within 7 day(s). 6.  Patient will participate in upper extremity therapeutic exercise/activities with supervision/set-up for 5 minutes within 7 day(s). 7.  Patient will utilize energy conservation techniques during functional activities with verbal cues within 7 day(s). 8. Patient will verbalize LVAD terminology with verbal cues within 7 day (s). Goals reviewed and continued/modified as follows 11/20/19 s/p LVAD implantation 1. Patient will perform upper body dressing including LVAD switchovers with moderate assistance within 7 day(s). 2.  Patient will perform lower body dressing with minimal assistance within 7 day(s). 3.  Patient will perform grooming with supervision/setup within 7 day(s). 4.  Patient will perform toilet transfers supervision/setupmodified independence within 7 day(s). 5.  Patient will perform all aspects of toileting with minimal assistance within 7 day(s).  
6.  Patient will participate in upper extremity therapeutic exercise/activities with supervision/set-up for 5 minutes within 7 day(s). 7.  Patient will utilize energy conservation techniques during functional activities with verbal cues within 7 day(s). 8. Patient will verbalize LVAD terminology with verbal cues within 7 day (s). Goals reviewed and continued/modified as follows 11/12/19 1. Patient will perform bathing with supervision/set-up within 7 day(s). 2.  Patient will perform lower body dressing with supervision/set-up within 7 day(s). 3.  Patient will perform grooming with modified independence within 7 day(s). 4.  Patient will perform toilet transfers with modified independence within 7 day(s). 5.  Patient will perform all aspects of toileting with supervision/set-up within 7 day(s). 6.  Patient will participate in upper extremity therapeutic exercise/activities with supervision/set-up for 5 minutes within 7 day(s). 7.  Patient will utilize energy conservation techniques during functional activities with verbal cues within 7 day(s). 8. Patient will verbalize LVAD terminology with verbal cues within 7 day (s) in preparation for implant. Continue all goals 10/30/19 post re-eval for Impella removal  
Initiated 10/28/2019 1. Patient will perform bathing with supervision/set-up within 7 day(s). 2.  Patient will perform lower body dressing with supervision/set-up within 7 day(s). 3.  Patient will perform grooming with modified independence within 7 day(s). 4.  Patient will perform toilet transfers with modified independence within 7 day(s). 5.  Patient will perform all aspects of toileting with supervision/set-up within 7 day(s). 6.  Patient will participate in upper extremity therapeutic exercise/activities with supervision/set-up for 5 minutes within 7 day(s). 7.  Patient will utilize energy conservation techniques during functional activities with verbal cues within 7 day(s).  
 
 
 
      
Outcome: Progressing Towards Goal 
 
 OCCUPATIONAL THERAPY TREATMENT Patient: Montserrat Bryant (75 y.o. male) Date: 12/5/2019 Diagnosis: Acute decompensated heart failure ([facility] [address] [I50.9] <principal problem not specified> Procedure(s) (LRB): LEFT BRACHIAL THROMBECTOMY (Left) 10 Days Post-Op Precautions: Fall, Sternal 
Chart, occupational therapy assessment, plan of care, and goals were reviewed. ASSESSMENT Patient continues with skilled OT services and is progressing towards goals. Pt transferred to CVICU yesterday for decreased respiratory status but on room air during today's session. He remains extremely tremulous and jerky in UEs, with poor activity tolerance for EOB sitting ~5 minutes. Assisted with placing pt's dentures. He was unable to name sx he underwent and required cueing with the first word for both  and drive lines. He is functioning significantly below his ADL baseline. Current Level of Function Impacting Discharge (ADLs): mod to max A for ADLs, poor activity tolerance, DOUGLAS Other factors to consider for discharge: LVAD PLAN : 
Patient continues to benefit from skilled intervention to address the above impairments. Continue treatment per established plan of care. to address goals. Recommend with staff: OOB to chair and BSC Recommend next OT session: functional mobility, naming LVAD items, seated ADLs Recommendation for discharge: (in order for the patient to meet his/her long term goals) Therapy 3 hours per day 5-7 days per week This discharge recommendation: 
Has been made in collaboration with the attending provider and/or case management IF patient discharges home will need the following DME: to be determined (TBD) SUBJECTIVE:  
Patient stated I just feel so short of breath.  OBJECTIVE DATA SUMMARY:  
Cognitive/Behavioral Status: 
Neurologic State: Alert; Appropriate for age Orientation Level: Oriented X4 Cognition: Appropriate decision making; Appropriate for age attention/concentration; Appropriate safety awareness; Follows commands Functional Mobility and Transfers for ADLs: 
Bed Mobility: 
Supine to Sit: Moderate assistance Sit to Supine: Moderate assistance Scooting: Minimum assistance Transfers: 
  
 Did not attempt Balance: 
Sitting: Impaired Sitting - Static: Fair (occasional) Sitting - Dynamic: Fair (occasional) ADL Intervention: 
  
 
 Pt unable to state name of heart implantation, unable to state acronym nor full name. Could not name drive line nor  without prompting and given first word of each. Activity Tolerance:  
Poor, requires rest breaks, and observed SOB with activity Please refer to the flowsheet for vital signs taken during this treatment. After treatment patient left in no apparent distress:  
Supine in bed and Call bell within reach COMMUNICATION/COLLABORATION:  
The patients plan of care was discussed with: Physical Therapist, Registered Nurse, and Rehabilitation Attendant Conchis Caraballo OT Time Calculation: 21 mins

## 2019-12-05 NOTE — CONSULTS
[place] Name:  Roberto Frazier 
MR#:  [id number] :  1950 ACCOUNT #:  [de-identified] DATE OF SERVICE:  2019 REQUESTING PHYSICIAN:  Virgilio Aguirre NP 
 
REASON FOR EVALUATION:  Abnormal involuntary movements. HISTORY OF PRESENT ILLNESS:  The patient is a 51-year-old pleasant white male with history of hypertension, hyperlipidemia, obstructive sleep apnea, coronary artery disease status post cardiac arrest, ventricular fibrillation status post CABG, ischemic cardiomyopathy status post ICD implantation. He was admitted with acute on chronic systolic congestive heart failure and is now status post LVAD implantation. Post surgery he has been noted to have generalized twitching of his extremities. He was noted to have digoxin toxicity two days ago, but his digoxin levels are now normal.  He also received some Restoril and had few doses of narcotic analgesics, but none in the last 24 hours. Per his wife, he was having twitches in the morning, but he did better in the afternoon, but this morning he again looks worse than what he was yesterday. Denies any headache, changes in vision, speech or focal motor sensory deficits. He has also not been able to put weight on his legs because of the tremor. PAST MEDICAL HISTORY:  As mentioned above. FAMILY HISTORY:  Noncontributory. ALLERGIES:  NONE. CURRENT MEDICATIONS:  Sildenafil, Protonix, aspirin, Brovana, albuterol, milrinone, magnesium, oxycodone p.r.n., hydralazine. PHYSICAL EXAMINATION: 
GENERAL:  The patient is sitting in bed in no acute distress. VITAL SIGNS:  Blood pressure 74/56, temperature 98.3, pulse is 92. NEUROLOGIC:  He is able to answer simple questions and follows all commands. Pupils are equal and reactive. Extraocular movements are full. Face is symmetric. Tongue is midline. Hearing is baseline.   Muscle tone is normal.  No cogwheeling or rigidity. Strength normal in both upper and lower extremities. There is myoclonic type of tremor noted in both upper and lower extremities when they are raised against gravity. Sensation is intact. Gait exam is deferred. HEART:  Regular rate. CHEST:  Clear. ABDOMEN:  Soft, nontender. Positive bowel sounds. EXTREMITIES:  No edema. LABORATORY DATA:  CBC with WBC of 5.0, hemoglobin 9.1, hematocrit 29.1, platelets 760. Chemistry, sodium 140, potassium 4.6, BUN 20, creatinine 1.92. Digoxin level was 2.6 on 12/03, but it is 1.4 today. ASSESSMENT AND PLAN:  A 58-year-old male with acute on chronic congestive heart failure, status post left ventricular assist device who has what appears to be symptomatic generalized myoclonus. Suspect that this is most likely from toxic/metabolic causes and could be from renal insufficiency, digoxin toxicity, narcotic analgesic use and antibiotics, etc.  We will check CT brain and EEG to rule out a central process. We will try Keppra 250 mg twice daily for symptomatic suppression. If it does not help low dose benzodiazepine can be considered. Myoclonus from metabolic causes should resolve on its own once the offending agent and metabolic issues resolve. We will follow up. Thank you for this consultation. Alvin Soto MD 
 
 
[id number] 
D:  12/05/2019 14:05 
T:  12/05/2019 15:59 
JOB #:  [id number]

## 2019-12-05 NOTE — DIABETES MGMT
[facility] Cardiac Surgery Note Recommendations/ Comments: Consult received for medication recommendations. Pt is s/p LVAD placement. Had brachial thrombectomy 11/26/19. Pt returned to CVICU 12/4/2019 due to respiratory distress. SLP note appreciated - seen for dysphagia. For scope today. NPO at this time BG has continued to be relatively stable during LOS ranging  mg/dl over past 24 hours. Has required limited amount of correction insulin while in house. Continuing to follow. Current hospital DM medication: lispro correction scale, normal sensitivity Chart reviewed and initial evaluation complete on Sona Kiser. Patient is a 71 y.o. male with no prior hx. Recent A1c suggestive of new dx. DTC saw pt for education regarding new diagnosis on 11/27/2019 A1c:  
Lab Results Component Value Date/Time Hemoglobin A1c 6.6 (H) 10/25/2019 02:56 PM  
              
Recent Glucose Results:  
Lab Results Component Value Date/Time  (H) 12/05/2019 03:21 AM  
 GLUCPOC 99 12/05/2019 07:06 AM  
 GLUCPOC 105 (H) 12/04/2019 09:34 PM  
 GLUCPOC 100 12/04/2019 04:24 PM  
  
 
Lab Results Component Value Date/Time Creatinine 1.52 (H) 12/05/2019 03:21 AM  
 
Estimated Creatinine Clearance: 47.9 mL/min (A) (based on SCr of 1.52 mg/dL (H)). Active Orders Diet DIET NPO  
  
 
PO intake:  
Patient Vitals for the past 72 hrs: 
 % Diet Eaten 12/03/19 1118 75 % 12/03/19 0743 100 % Will continue to follow as needed. Thank you. Migdalia Springer RN, CDE [facility] Time spent: 4 minutes

## 2019-12-06 NOTE — DIABETES MGMT
[facility] Cardiac Surgery Note Recommendations/ Comments: Consult received for medication recommendations. BG's in rang and has required no lispro correciton Pt is s/p LVAD placement. Had brachial thrombectomy 11/26/19. Pt returned to CVICU 12/4/2019 due to respiratory distress. SLP and nutrition consults appreciated Noted plan to begin TF Osmolite 1.5 @ 25, advance to goal of 55 Observe BG response to TF. If results are > 180 mg/dL become persistently, consider addition of Lantus, wt based dose 15 units daily Current hospital DM medication: lispro correction scale, normal sensitivity Chart reviewed and initial evaluation complete on Sona Kiser. Patient is a 71 y.o. male with no prior hx. Recent A1c suggestive of new dx. DTC saw pt for education regarding new diagnosis on 11/27/2019. May need review closer to discharge A1c:  
Lab Results Component Value Date/Time Hemoglobin A1c 6.6 (H) 10/25/2019 02:56 PM  
              
Recent Glucose Results:  
Lab Results Component Value Date/Time GLU 98 12/06/2019 03:39 AM  
  (H) 12/05/2019 03:50 PM  
 GLUCPOC 100 12/06/2019 11:38 AM  
 GLUCPOC 100 12/06/2019 07:30 AM  
 GLUCPOC 92 12/05/2019 10:17 PM  
  
 
Lab Results Component Value Date/Time Creatinine 2.38 (H) 12/06/2019 03:39 AM  
 
Estimated Creatinine Clearance: 33.8 mL/min (A) (based on SCr of 2.38 mg/dL (H)). Active Orders Diet DIET NPO With Tube Feedings PO intake:  
Patient Vitals for the past 72 hrs: 
 % Diet Eaten 12/06/19 1200 0 % 12/06/19 0800 0 % Will continue to follow as needed. Thank you. Nazanin To RN, CDE [facility] Time spent: 4 minutes

## 2019-12-06 NOTE — PROGRESS NOTES
2000- Bedside and Verbal shift change report given to [address] (oncoming nurse) by Kristina Barrow RN (offgoing nurse). Report included the following information SBAR, Procedure Summary, Intake/Output, Recent Results and Cardiac Rhythm Paced. 0000- Pt reassessed. No change. 0240- Pt was placed on CPAP around 2200. Pt not tolerating CPAP at this time. Attempting to remove every 5 minutes even with redirection. Pt appears to be getting more confused, stating he is in [employer]". Placed pt on 2L NC in the hopes that he will get some rest and return to base line cognitively. 0330- Nurse sitting at bedside. Placed pt back on CPAP. Tolerating at this time. 0400- Pt reassessed. Labs sent 
 
0700- Dr. Hemant Gifford at bedside. Updated. Stated to hold am Bumex. Give Albumin this am. Stated would like a dobhoff placed today. Will round this afternoon and determine if he will need Bumex this afternoon. 0800- Bedside and Verbal shift change report given to MANJEET RN (oncoming nurse) by Rob Sun RN (offgoing nurse). Report included the following information SBAR, Procedure Summary, Intake/Output, Recent Results and Cardiac Rhythm Paced.

## 2019-12-06 NOTE — CONSULTS
[facility] Kaylie Cisneros MD 
Pager [phone] Cell  Patient: Matt Bailey MRN: [id number]  SSN: [id number] YOB: 1950  Age: 71 y.o. Sex: male Subjective:  
Patient is a 71 y.o.  male now with concerns with aspiration on thin liquid with barium swallow. He is unable to tell me if voice is more weak. No change in shortness of breath. Post proc risk CN X. Patient Active Problem List  
 Diagnosis Date Noted  Acute decompensated heart failure ([address] 10/25/2019  Peripheral vascular disease ([address] 09/12/2019  Systolic CHF, chronic ([address] 08/23/2019  Acute on chronic systolic CHF (congestive heart failure) ([address] 05/31/2019  Paroxysmal atrial fibrillation ([address] 04/02/2019 Past Medical History:  
Diagnosis Date  Degenerative disc disease, lumbar  Heart failure ([address]  High cholesterol  Hypertension  Paroxysmal atrial fibrillation ([address] 4/2/2019  Spinal stenosis Past Surgical History:  
Procedure Laterality Date  COLONOSCOPY Left 11/11/2019 COLONOSCOPY at bedside performed by Erica Phelan MD at [address]  HX CORONARY ARTERY BYPASS GRAFT    
 triple  HX HERNIA REPAIR    
 HX IMPLANTABLE CARDIOVERTER DEFIBRILLATOR  [place] CARDIOVERSION ELECTIVE ARRHYTHMIA EXTERNAL N/A 6/10/2019 EP CARDIOVERSION performed by Liz Keene MD at [place] [address] CATH LAB  [place] CARDIOVERSION ELECTIVE ARRHYTHMIA EXTERNAL N/A 6/18/2019 EP CARDIOVERSION performed by John Wooten MD at [place] [address], [address] CATH LAB  [place] INSJ/RPLCMT PERM DFB W/TRNSVNS LDS 1/DUAL CHMBR N/A 6/21/2019 INSERT ICD BIV MULTI performed by Sisi Harrell MD at [place] [address], [address] CATH LAB  [place] TCAT IMPL WRLS P-ART PRS SNR L-T HEMODYN MNTR N/A 9/18/2019 IMPLANT HEART FAILURE MONITORING DEVICE performed by Fritz Meyer MD at [place] [address], [address] CATH LAB Prior to Admission medications Medication Sig Start Date End Date Taking? Authorizing Provider  
sildenafil, pulm. hypertension, (REVATIO) 20 mg tablet Take 1 Tab by mouth three (3) times daily. 12/2/19  Yes Jacque Daley NP  
apixaban (ELIQUIS) 5 mg tablet Take 5 mg by mouth two (2) times a day. Yes Provider, Historical  
escitalopram oxalate (LEXAPRO) 5 mg tablet TAKE 1 TABLET BY MOUTH EVERY DAY 10/10/19   Provider, Historical  
torsemide (DEMADEX) 20 mg tablet Take 3 Tabs by mouth two (2) times a day. 10/2/19   Jennifer Mitchell MD  
tamsulosin (FLOMAX) 0.4 mg capsule Take 0.4 mg by mouth daily. 7/18/19   Provider, Historical  
carvedilol (COREG) 6.25 mg tablet Take 1 Tab by mouth two (2) times daily (with meals). 7/31/19   Jennifer Mitchell MD  
milrinone (PRIMACOR) 20 mg/100 mL (200 mcg/mL) infusion 18.14 mcg/min by IntraVENous route continuous. 6/26/19   Toni Wells MD  
aspirin delayed-release 81 mg tablet Take 81 mg by mouth daily. Provider, Historical  
spironolactone (ALDACTONE) 25 mg tablet Take 25 mg by mouth daily. Provider, Historical  
 
Current Facility-Administered Medications Medication Dose Route Frequency  levETIRAcetam (KEPPRA) 250 mg in 0.9% sodium chloride 100 mL IVPB  250 mg IntraVENous Q12H  
 0.9% sodium chloride infusion  6 mL/hr IntraVENous CONTINUOUS  
 allopurinol (ZYLOPRIM) tablet 100 mg  100 mg Oral DAILY  ipratropium (ATROVENT) 0.02 % nebulizer solution 0.5 mg  0.5 mg Nebulization Q8H RT  
 sildenafil (REVATIO) 2 mg/mL oral suspension 20 mg  20 mg Oral Q8H  
 pantoprazole (PROTONIX) 40 mg in 0.9% sodium chloride 10 mL injection  40 mg IntraVENous DAILY  aspirin (ASA) suppository 75 mg  75 mg Rectal DAILY  arformoterol (BROVANA) neb solution 15 mcg  15 mcg Nebulization BID RT  And  
 budesonide (PULMICORT) 500 mcg/2 ml nebulizer suspension  500 mcg Nebulization BID RT  
 albuterol (PROVENTIL VENTOLIN) nebulizer solution 2.5 mg  2.5 mg Nebulization Q4H RT  
  milrinone (PRIMACOR) 20 MG/100 ML D5W infusion  0.2 mcg/kg/min IntraVENous CONTINUOUS  piperacillin-tazobactam (ZOSYN) 3.375 g in 0.9% sodium chloride (MBP/ADV) 100 mL  3.375 g IntraVENous Q8H  
 magnesium oxide (MAG-OX) tablet 400 mg  400 mg Oral DAILY  artificial saliva (MOUTH KOTE) 1 Spray  1 Spray Oral PRN  
 lactobac ac& pc-s.therm-b.anim (TIAN Q/RISAQUAD)  1 Cap Oral DAILY  oxyCODONE IR (ROXICODONE) tablet 5 mg  5 mg Oral Q6H PRN  
 balsam peru-castor oil (VENELEX) ointment   Topical TID  [Held by provider] tamsulosin (FLOMAX) capsule 0.4 mg  0.4 mg Oral DAILY  insulin lispro (HUMALOG) injection   SubCUTAneous AC&HS  Warfarin MD/NP dosing   Other PRN  
 epoetin yvonne-epbx (RETACRIT) injection 20,000 Units  20,000 Units SubCUTAneous Q7D  
 [Held by provider] lidocaine (LIDODERM) 5 % patch 1 Patch  1 Patch TransDERmal Q24H  
 sodium chloride (NS) flush 5-40 mL  5-40 mL IntraVENous Q8H  
 sodium chloride (NS) flush 5-40 mL  5-40 mL IntraVENous PRN  
 acetaminophen (TYLENOL) tablet 650 mg  650 mg Oral Q6H PRN  
 naloxone (NARCAN) injection 0.4 mg  0.4 mg IntraVENous PRN  
 ondansetron (ZOFRAN) injection 4 mg  4 mg IntraVENous Q4H PRN  
 [Held by provider] senna-docusate (PERICOLACE) 8.6-50 mg per tablet 1 Tab  1 Tab Oral DAILY  [Held by provider] polyethylene glycol (MIRALAX) packet 17 g  17 g Oral DAILY  bisacodyl (DULCOLAX) tablet 5 mg  5 mg Oral DAILY PRN  
 [Held by provider] sucralfate (CARAFATE) tablet 1 g  1 g Oral AC&HS  influenza vaccine - (6 mos+)(PF) (FLUARIX/FLULAVAL/FLUZONE QUAD) injection 0.5 mL  0.5 mL IntraMUSCular PRIOR TO DISCHARGE  hydrALAZINE (APRESOLINE) 20 mg/mL injection 20 mg  20 mg IntraVENous Q6H PRN  
 dextrose 10 % infusion 125-250 mL  125-250 mL IntraVENous PRN No Known Allergies Social History Tobacco Use  Smoking status: Former Smoker Last attempt to quit: 2010 Years since quittin.0  Smokeless tobacco: Never Used Substance Use Topics  Alcohol use: Not Currently Comment: rarely Family History Problem Relation Age of Onset  Lung Disease Mother  Hypertension Mother [facility] Arthritis-osteo Mother  Heart Disease Mother  Heart Disease Father  Diabetes Father Review of Systems A comprehensive review of systems was negative except for that written in the HPI. FAMILY HISTORY: No bleeding disorders, no anesthesia concerns in familiy Objective:  
 
Patient Vitals for the past 8 hrs: 
 Temp Pulse Resp SpO2 Weight 19 1234  81 20 96 %   
19 1200 98.8 °F (37.1 °C) 86 22 98 %   
19 1100  79 26 96 %   
19 1000  80 18 99 %   
19 0900  78 21 93 %   
19 0800 98.4 °F (36.9 °C) 73 19 98 %   
19 0700  85 24 100 %   
19 0600  82 26 98 %   
19 0545     81.6 kg  
19 0500  83 20 98 %  Temp (24hrs), Av.4 °F (36.9 °C), Min:98.1 °F (36.7 °C), Max:98.8 °F (37.1 °C) PHYSICAL EXAMINATION 
VITAL SIGNS: as above GENERAL APPEARANCE and COMMUNICATION/VOICE: awake, alert, talkative - some weakness to voice but same strength with head turn right and to left - voice not weaker if turn head to one side HEAD: Atraumatic. No gross craniofacial deformities. No abnormal masses or lesions on inspection of head. FACE: No abnormal masses or lesions on inspection and palpation of face and sinuses. FACIAL MUSCLE STRENGTH: Normal without droop. NECK/TRACHEA: Midline. Normal crepitus. HEARING: Grossly normal hearing at normal speech tones. EXTERNAL EARS/NOSE: Normal anatomy of auricles, nose grossly normal without deformity. I  Labs:  
Recent Results (from the past 24 hour(s)) METABOLIC PANEL, BASIC Collection Time: 19  3:50 PM  
Result Value Ref Range Sodium 141 136 - 145 mmol/L Potassium 3.7 3.5 - 5.1 mmol/L  Chloride 103 97 - 108 mmol/L  
 CO2 33 (H) 21 - 32 mmol/L  
 Anion gap 5 5 - 15 mmol/L Glucose 136 (H) 65 - 100 mg/dL BUN 23 (H) 6 - 20 MG/DL Creatinine 2.05 (H) 0.70 - 1.30 MG/DL  
 BUN/Creatinine ratio 11 (L) 12 - 20 GFR est AA 39 (L) >60 ml/min/1.73m2 GFR est non-AA 32 (L) >60 ml/min/1.73m2 Calcium 9.2 8.5 - 10.1 MG/DL  
GLUCOSE, POC Collection Time: 12/05/19  5:11 PM  
Result Value Ref Range Glucose (POC) 104 (H) 65 - 100 mg/dL Performed by Ada Ashley GLUCOSE, POC Collection Time: 12/05/19 10:17 PM  
Result Value Ref Range Glucose (POC) 92 65 - 100 mg/dL Performed by Madelyn De La O METABOLIC PANEL, COMPREHENSIVE Collection Time: 12/06/19  3:39 AM  
Result Value Ref Range Sodium 143 136 - 145 mmol/L Potassium 3.9 3.5 - 5.1 mmol/L Chloride 107 97 - 108 mmol/L  
 CO2 31 21 - 32 mmol/L Anion gap 5 5 - 15 mmol/L Glucose 98 65 - 100 mg/dL BUN 24 (H) 6 - 20 MG/DL Creatinine 2.38 (H) 0.70 - 1.30 MG/DL  
 BUN/Creatinine ratio 10 (L) 12 - 20 GFR est AA 33 (L) >60 ml/min/1.73m2 GFR est non-AA 27 (L) >60 ml/min/1.73m2 Calcium 9.3 8.5 - 10.1 MG/DL Bilirubin, total 1.1 (H) 0.2 - 1.0 MG/DL  
 ALT (SGPT) 7 (L) 12 - 78 U/L  
 AST (SGOT) 14 (L) 15 - 37 U/L Alk. phosphatase 98 45 - 117 U/L Protein, total 6.8 6.4 - 8.2 g/dL Albumin 3.2 (L) 3.5 - 5.0 g/dL Globulin 3.6 2.0 - 4.0 g/dL A-G Ratio 0.9 (L) 1.1 - 2.2 MAGNESIUM Collection Time: 12/06/19  3:39 AM  
Result Value Ref Range Magnesium 2.4 1.6 - 2.4 mg/dL CBC W/O DIFF Collection Time: 12/06/19  3:39 AM  
Result Value Ref Range WBC 5.1 4.1 - 11.1 K/uL  
 RBC 2.79 (L) 4.10 - 5.70 M/uL HGB 8.3 (L) 12.1 - 17.0 g/dL HCT 27.9 (L) 36.6 - 50.3 % .0 (H) 80.0 - 99.0 FL  
 MCH 29.7 26.0 - 34.0 PG  
 MCHC 29.7 (L) 30.0 - 36.5 g/dL  
 RDW 18.1 (H) 11.5 - 14.5 % PLATELET 088 122 - 733 K/uL MPV 9.3 8.9 - 12.9 FL  
 NRBC 0.0 0  WBC ABSOLUTE NRBC 0.00 0.00 - 0.01 K/uL PROTHROMBIN TIME + INR  
 Collection Time: 12/06/19  3:39 AM  
Result Value Ref Range INR 4.0 (H) 0.9 - 1.1 Prothrombin time 37.4 (H) 9.0 - 11.1 sec PTT Collection Time: 12/06/19  3:39 AM  
Result Value Ref Range aPTT 47.0 (H) 22.1 - 32.0 sec  
 aPTT, therapeutic range     58.0 - 77.0 SECS  
DIGOXIN Collection Time: 12/06/19  3:39 AM  
Result Value Ref Range Digoxin level 1.7 0.90 - 2.00 NG/ML  
LACTIC ACID Collection Time: 12/06/19  3:39 AM  
Result Value Ref Range Lactic acid 1.1 0.4 - 2.0 MMOL/L  
LD Collection Time: 12/06/19  3:39 AM  
Result Value Ref Range  85 - 241 U/L  
NT-PRO BNP Collection Time: 12/06/19  3:39 AM  
Result Value Ref Range NT pro-BNP 10,415 (H) <125 PG/ML  
GLUCOSE, POC Collection Time: 12/06/19  7:30 AM  
Result Value Ref Range Glucose (POC) 100 65 - 100 mg/dL Performed by Jacoby Renner GLUCOSE, POC Collection Time: 12/06/19 11:38 AM  
Result Value Ref Range Glucose (POC) 100 65 - 100 mg/dL Performed by Ijeoma Miller Assessment:  
Aspiration thin liquids on BS 
? Poss X Palsy post procedure - Plan: Will return with scope at bedside Friday - assess VC - in the meantime intake precautions - thick liquids and thicker or per speech/swallow guidelines Thank you for this consult Fredis Shoemaker MD

## 2019-12-06 NOTE — PROGRESS NOTES
Speech Therapy Spoke with RN. Awaiting ENT clearance for PO trials. Following. Delia Duncan M.S., CCC-SLP

## 2019-12-06 NOTE — PROGRESS NOTES
[id number] [facility] [place] [place] Inpatient Progress Note Patient name: Jimmy Javier Patient : 1950 Patient MRN: [id number] Attending MD: Shari Donaldson MD 
Date of service: 19 Chief Complaint:  
Rosie Llanes azucena LVAD implant 
  
HPI: Sona Kiser is a 71y.o. year old pleasant white male with a history of HTN, HLD, JOSE, CAD s/p cardiac arrest VFib s/p CABG () c/b sternal would infection and sternectomy, ischemic cardiomyopathy LVEF 15-20%, s/p ICD and with LBBB. He was admitted with acute on chronic systolic heart failure with massive volume overload > 20 lbs, in the setting of atrial fibrillation s/p failed DCCV and underwent DCCV and RHC on .  S/p BiVICD on 19 with Dr. Chang Gil. He was discharged home home on IV milrinone on 19. [facility] has been followed closely by Dr. Irlanda Oliver and the [facility]. 
  
Mr. Kiser was admitted for acute on chronic systolic heart failure. He underwent implantation of Impella 5.0 due to CS and has completed his LVAD evaluation. [facility] meets criteria for implant of HM3 as DT. He was NYHA Class IV prior to implant of Impella 5.0, has LVEF < 15%, was intolerant of GDMT due to symptomatic hypotension and renal dysfunction. He remains dependent on temporary MCS support. RHC  revealed compensated hemodynamics on Impella. His renal function has improved dramatically with improvement in his hemodynamics. He is not a suitable transplant candidate due to single kidney, sternectomy, debility, and frailty. He was reviewed by [address] [place] [address] Drive and felt to be a high risk heart transplant candidate due to multiple co-morbidities as well as the afore mentioned conditions.  He remained in the CCU and underwent a HM 3 implant as DT on . He was weaned off of pressors and transferred to [place] [address] where he continues to undergo PT/OT and hemodynamic optimization.   
  
24Hr Events Brisk diuresis Adequate VAD flows Cr worse Myoclonus is worse New onset of diplopia 
  
Procedure:  Procedure(s): REMOVAL TEMPORARY LEFT VENTRICULAR ASSIST DEVICE, IMPLANTATION OF LEFT VENTRICULAR ASSIST DEVICE PERMANENT (VAD), ECC, JACQUE, EPI AORTIC US BY DR Abhilash Zazueta.    
FYY:  79 
  
Impression / Plan:  
Heart Failure Status: NYHA Class IV INTERMACS Category 2 
  
Chronic systolic heart failure due to ICM, NYHA Class IV, EF < 15%, cardiogenic shock bridged with Impella 5.0 support to HM 3 implant S/p Impella removal and LVAD implant Continue RPMs at 5600 - LVIDd down to 5.49 cm  
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 
  
Anticoagulation for LVAD, INR goal 2-3 Continue ASA Cont coumadin tonight 2 mg 
  
Acute Respiratory Failure post op Improved with bumex gtt, milrinone gtt Discontinue bumex CXR and O2 requirement improved TTE with Bubble study negative for PFO Pulmonary Consult appreciated Pulmonary hygiene Cont home CPAP- must use while sleeping  
  
Post op Pain PRN Tylenol Comfort measures  
  
L Brachial Artery Thrombosis s/p thrombectomy Surgical management per Dr. Usha Marrufo On warfarin, INR at goal 
Pain improved  
  
Swelling, pain of right arm, acute Doppler studies (-) for thrombus  
Continue AC Confirmed PICC placement 
  
CKD 3, atrophic left kidney Appreciate Nephrology assistance Hold bumex - due to over-diuresis IVF bolus IV milrinone Avoid nephrotoxins Trend labs ProNTBNP remains elevated 
  
CAD s/p CABG Cont low dose ASA- watch platelets No BB d/t RV failure Hold statin due to recent hepatic failure LHC performed 11/13 - low likelihood of benefit from revascularization  
  
Hx of VF arrest s/p BiV-ICD No recent shocks Keep K > 4 and Mg > 2  
   
PAF Currently in ST, Paced Hold digoxin - level 1.4 today - do not resume until < 1 Repeat TFTs WNL 
  
Hypokalemia IV repletion until taking PO 
  
Hx of gout Uric acid WNL 
  
Suspected aspiration pneumonia RUL consolidation Suspect aspiration Limit sedatives Sputum culture Zosyn Consult ENT and SLP 
NPO until seen by SLP and ENT  
  
Urinary retention, c/b serratia UTI and hematuria Appreciate Urology consult Cystoscopy performed 11/13 shows catheter induced cystitis Repeat UA shows resolution of UTI  
  
Malnutrition Appreciate Nutritionist consult Repeat prealbumin Continue Marinol 5 mg PO BID 
6 small meals daily once taking PO Hold mirtazapine d/t tremors, confusion  
  
COPD Appreciate Pulmonology assistance Continue nebs PRN   
PFTs complete 
  
Anemia Multifactorial, due to hemolysis + epistaxis + hematuria Intolerant of octreotide or doxycycline for AVM ppx due to prolonged QTc Transfuse for Hgb goal > 8 
  
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  
  
Hx of sternal wound infection, sternectomy Sternum closed post op- monitor  
  
Vocal cord paralysis Improved ENT consult pending Speech therapy following - appreciate input 
  
Debility Continue PT/OT  
  
Ppx Protonix PT/OT Post-op abx complete Bowel regimen Cont Mechanical soft PICC placed 
  
Dispo: 
Likely will need IP rehab. Remain in CVICU for now. LVAD INTERROGATION: 
Device interrogated in person Device function normal, normal flow, no events LVAD  
 Pump Speed (RPM): 8081 Pump Flow (LPM): 5 MAP: 80 
PI (Pulsitility Index): 3.5 Power: 4.2  Test: Yes 
Back Up  at Bedside & Labeled: Yes Power Module Test: Yes Driveline Site Care: No 
Driveline Dressing: Clean, Dry, and Intact Outpatient: No 
MAP in Therapeutic Range (Outpatient): Yes Testing Alarms Reviewed: Yes 
Back up SC speed: 5600 Back up Low Speed Limit: 5200 Emergency Equipment with Patient?: Yes Emergency procedures reviewed?: Yes Drive line site inspected?: No 
Drive line intergrity inspected?: Yes Drive line dressing changed?: No 
 
PHYSICAL EXAM: 
Visit Vitals BP (!) 74/56 Pulse 79 Temp 98.4 °F (36.9 °C) Resp 26 Ht 6' 2\" (1.88 m) Wt 179 lb 14.3 oz (81.6 kg) SpO2 96% BMI 23.10 kg/m² General: Patient is well developed, well-nourished in no acute distress. Generalized myoclonus. HEENT: Normocephalic and atraumatic. No scleral icterus. Pupils are equal, round and reactive to light and accomodation. No conjunctival injection. Oropharynx is clear. Lips are dry. Neck: Supple. No evidence of thyroid enlargements or lymphadenopathy. JVD: Cannot be appreciated Lungs: Breath sounds are equal and clear bilaterally. No wheezes, rhonchi, or rales. Heart: Regular rate and rhythm with normal S1 and S2. No murmurs, gallops or rubs. Abdomen: Soft, no mass or tenderness. No organomegaly or hernia. Bowel sounds present. Genitourinary and rectal: deferred Extremities: No cyanosis, clubbing, or edema. Myoclonus. Neurologic: right 3rd CN palsy - weakness of medial and upward rotation of the right eyeball Psychiatric: Lethargic. Skin: Warm, dry and well perfused. No lesions, nodules or rashes are noted. REVIEW OF SYSTEMS: 
General: Denies fever, night sweats.  
Ear, nose and throat: Denies difficulty hearing, sinus problems, runny nose, post-nasal drip, ringing in ears, mouth sores, loose teeth, ear pain, nosebleeds, sore throate, facial pain or numbness, new onset diploplia Cardiovascular: see above in the interval history Respiratory: Reports dyspnea Gastrointestinal: Denies heartburn, constipation, intolerance to certain foods, diarrhea, abdominal pain, nausea, vomiting, difficulty swallowing, blood in stool Kidney and bladder: Denies painful urination, frequent urination, urgency, prostate problems and impotence Musculoskeletal: Denies joint pain, muscle weakness; positive for myoclonic jerks Skin and hair: Denies change in existing skin lesions, hair loss or increase, breast changes PAST MEDICAL HISTORY: 
Past Medical History:  
Diagnosis Date  Degenerative disc disease, lumbar  Heart failure ([facility] [address]  High cholesterol  Hypertension  Paroxysmal atrial fibrillation ([facility] [address] 4/2/2019  Spinal stenosis PAST SURGICAL HISTORY: 
Past Surgical History:  
Procedure Laterality Date  COLONOSCOPY Left 11/11/2019 COLONOSCOPY at bedside performed by Reyes Solo, MD at [address] [place] [address]  HX CORONARY ARTERY BYPASS GRAFT    
 triple  HX HERNIA REPAIR    
 HX IMPLANTABLE CARDIOVERTER DEFIBRILLATOR  MS CARDIOVERSION ELECTIVE ARRHYTHMIA EXTERNAL N/A 6/10/2019 EP CARDIOVERSION performed by Mj Gamez MD at [place] [address], [address] CATH LAB  MS CARDIOVERSION ELECTIVE ARRHYTHMIA EXTERNAL N/A 6/18/2019 EP CARDIOVERSION performed by Carlos Browne MD at [place] [address], [address] CATH LAB  MS INSJ/RPLCMT PERM DFB W/TRNSVNS LDS 1/DUAL CHMBR N/A 6/21/2019 INSERT ICD BIV MULTI performed by Jose Myles MD at [place] [address], [address] CATH LAB  MS TCAT IMPL WRLS P-ART PRS SNR L-T HEMODYN MNTR N/A 9/18/2019 IMPLANT HEART FAILURE MONITORING DEVICE performed by Laurel Brown MD at [place] [address], [address] CATH LAB FAMILY HISTORY: 
Family History Problem Relation Age of Onset  Lung Disease Mother  Hypertension Mother [facility] Arthritis-osteo Mother  Heart Disease Mother  Heart Disease Father  Diabetes Father SOCIAL HISTORY: 
Social History Socioeconomic History  Marital status:  Spouse name: Not on file  Number of children: Not on file  Years of education: Not on file  Highest education level: Not on file Tobacco Use  Smoking status: Former Smoker Last attempt to quit: 2010 Years since quittin.0  Smokeless tobacco: Never Used Substance and Sexual Activity  Alcohol use: Not Currently Comment: rarely  Drug use: Never Other Topics Concern LABORATORY RESULTS: 
  
Labs Latest Ref Rng & Units 2019 2019 2019 2019 2019 12/3/2019 2019 WBC 4.1 - 11.1 K/uL 5.1 - - 5.0 5.3 5.7 5.8  
RBC 4.10 - 5.70 M/uL 2.79(L) - - 3.07(L) 3.00(L) 2.84(L) 2.63(L) Hemoglobin 12.1 - 17.0 g/dL 8. 3(L) - - 9. 1(L) 9.0(L) 8.4(L) 8.0(L) Hematocrit 36.6 - 50.3 % 27. 9(L) - - 29. 9(L) 29. 6(L) 28. 3(L) 26. 2(L) MCV 80.0 - 99.0 . 0(H) - - 97.4 98.7 99. 6(H) 99. 6(H) Platelets 765 - 572 K/uL 201 - - 203 235 239 241 Lymphocytes 12 - 49 % - - - - - - - Monocytes 5 - 13 % - - - - - - - Eosinophils 0 - 7 % - - - - - - - Basophils 0 - 1 % - - - - - - - Albumin 3.5 - 5.0 g/dL 3.2(L) - - 2. 9(L) 2. 7(L) 2. 4(L) 2. 6(L) Calcium 8.5 - 10.1 MG/DL 9.3 9.2 9.6 9.2 9.1 9.1 9.0 SGOT 15 - 37 U/L 14(L) - - 11(L) 13(L) 17 15 Glucose 65 - 100 mg/dL 98 136(H) 116(H) 110(H) 91 90 91 BUN 6 - 20 MG/DL 24(H) 23(H) 20 19 20 21(H) 21(H) Creatinine 0.70 - 1.30 MG/DL 2.38(H) 2.05(H) 1.92(H) 1.52(H) 1.69(H) 1.78(H) 1.70(H) Sodium 136 - 145 mmol/L 143 141 140 140 138 135(L) 135(L) Potassium 3.5 - 5.1 mmol/L 3.9 3.7 4.6 3. 0(L) 4.1 4.9 5.1 TSH 0.36 - 3.74 uIU/mL - - - - - 2.30 -  
PSA 0.01 - 4.0 ng/mL - - - - - - -  
LDH 85 - 241 U/L 234 - - 249(H) 286(H) 348(H) 271(H) CEA ng/mL - - - - - - - Some recent data might be hidden Lab Results Component Value Date/Time TSH 2.30 12/03/2019 03:29 AM  
 TSH 2.40 10/25/2019 07:39 PM  
 TSH 2.45 06/01/2019 04:16 AM  
 
 
ALLERGY: 
No Known Allergies CURRENT MEDICATIONS: 
 
Current Facility-Administered Medications:  
  levETIRAcetam (KEPPRA) 250 mg in 0.9% sodium chloride 100 mL IVPB, 250 mg, IntraVENous, Q12H, Javed Beck MD, 250 mg at 12/06/19 0602   albumin human 25% (BUMINATE) solution 25 g, 25 g, IntraVENous, Q6H, Logan Kincaid MD, 25 g at 12/06/19 0805 
  0.9% sodium chloride infusion, 6 mL/hr, IntraVENous, CONTINUOUS, Polliard, Jenni Spurling, NP 
  allopurinol (ZYLOPRIM) tablet 100 mg, 100 mg, Oral, DAILY, Polliard, Jenni Spurling, NP, Stopped at 12/05/19 1100   ipratropium (ATROVENT) 0.02 % nebulizer solution 0.5 mg, 0.5 mg, Nebulization, Q8H RT, Mera Andrews MD, 0.5 mg at 12/06/19 1023 
  sildenafil (REVATIO) 2 mg/mL oral suspension 20 mg, 20 mg, Oral, Q8H, Polliard, Jenni Spurling, NP, Stopped at 12/05/19 1000   pantoprazole (PROTONIX) 40 mg in 0.9% sodium chloride 10 mL injection, 40 mg, IntraVENous, DAILY, Cordell Herrera NP, 40 mg at 12/06/19 0845   aspirin (ASA) suppository 75 mg, 75 mg, Rectal, DAILY, Polliard, Jenni Spurling, NP, Stopped at 12/06/19 0900 
  arformoterol (BROVANA) neb solution 15 mcg, 15 mcg, Nebulization, BID RT, 15 mcg at 12/05/19 1949 **AND** budesonide (PULMICORT) 500 mcg/2 ml nebulizer suspension, 500 mcg, Nebulization, BID RT, Cordell Herrera NP, 500 mcg at 12/06/19 1023   albuterol (PROVENTIL VENTOLIN) nebulizer solution 2.5 mg, 2.5 mg, Nebulization, Q4H RT, Polliard, Cordell Latus T, NP, 2.5 mg at 12/06/19 1022 
  milrinone (PRIMACOR) 20 MG/100 ML D5W infusion, 0.2 mcg/kg/min, IntraVENous, CONTINUOUS, Mera Andrews MD, Last Rate: 5.2 mL/hr at 12/06/19 0802, 0.2 mcg/kg/min at 12/06/19 5801   piperacillin-tazobactam (ZOSYN) 3.375 g in 0.9% sodium chloride (MBP/ADV) 100 mL, 3.375 g, IntraVENous, Q8H, Sheeba Field MD, Last Rate: 25 mL/hr at 12/06/19 0804, 3.375 g at 12/06/19 8593   magnesium oxide (MAG-OX) tablet 400 mg, 400 mg, Oral, DAILY, Logan Kincaid MD, Stopped at 12/06/19 0900 
  artificial saliva (MOUTH KOTE) 1 Spray, 1 Spray, Oral, PRN, Sharon, Nicho Horta, NP 
  lactobac ac& pc-s.therm-b.anim (TIAN Q/RISAQUAD), 1 Cap, Oral, DAILY, Kim Hendrickson MD, Stopped at 12/04/19 0900 
  oxyCODONE IR (ROXICODONE) tablet 5 mg, 5 mg, Oral, Q6H PRN, Larissa Elizabeth MD, 5 mg at 12/03/19 8565   balsam peru-castor oil (VENELEX) ointment, , Topical, TID, Marlo Andrews MD 
  [Held by provider] tamsulosin (FLOMAX) capsule 0.4 mg, 0.4 mg, Oral, DAILY, Logan Kincaid MD, Stopped at 12/04/19 0900 
  insulin lispro (HUMALOG) injection, , SubCUTAneous, AC&HS, Shana Sims NP, Stopped at 12/02/19 1630   Warfarin MD/NP dosing, , Other, PRN, Mounika Altman MD 
  epoetin yvonne-epbx (RETACRIT) injection 20,000 Units, 20,000 Units, SubCUTAneous, Q7D, Nina Giraldo MD, 20,000 Units at 12/03/19 3446   [Held by provider] lidocaine (LIDODERM) 5 % patch 1 Patch, 1 Patch, TransDERmal, Q24H, Shana Sims NP, 1 Patch at 12/03/19 2158   sodium chloride (NS) flush 5-40 mL, 5-40 mL, IntraVENous, Q8H, Larissa Elizabeth MD, 10 mL at 12/06/19 0734   sodium chloride (NS) flush 5-40 mL, 5-40 mL, IntraVENous, PRN, Larissa Elizabeth MD 
  acetaminophen (TYLENOL) tablet 650 mg, 650 mg, Oral, Q6H PRN, Larissa Elizabeth MD, 650 mg at 12/03/19 0857 
  naloxone [facility]) injection 0.4 mg, 0.4 mg, IntraVENous, PRN, Lairssa Elizabeth MD 
  ondansetron [facility]) injection 4 mg, 4 mg, IntraVENous, Q4H PRN, Larissa Elizabeth MD, 4 mg at 11/20/19 2000   [Held by provider] senna-docusate (PERICOLACE) 8.6-50 mg per tablet 1 Tab, 1 Tab, Oral, DAILY, Larissa Elizabeth MD, Stopped at 12/04/19 0900   [Held by provider] polyethylene glycol (MIRALAX) packet 17 g, 17 g, Oral, DAILY, Huan Rodriguez MD, Stopped at 12/04/19 0900 
  bisacodyl (DULCOLAX) tablet 5 mg, 5 mg, Oral, DAILY PRN, Huan Rodriguez MD 
  [Held by provider] sucralfate (CARAFATE) tablet 1 g, 1 g, Oral, AC&HS, Huan Rodriguez MD, Stopped at 12/04/19 0730 
  influenza vaccine 2019-20 (6 mos+)(PF) (FLUARIX/FLULAVAL/FLUZONE QUAD) injection 0.5 mL, 0.5 mL, IntraMUSCular, PRIOR TO DISCHARGE, Jasmina Altman MD 
  hydrALAZINE (APRESOLINE) 20 mg/mL injection 20 mg, 20 mg, IntraVENous, Q6H PRN, Shana Sims, NP, 20 mg at 11/19/19 0547 
  dextrose 10 % infusion 125-250 mL, 125-250 mL, IntraVENous, PRN, Aislinn Frye MD, Stopped at 11/18/19 0700 Thank you for letting us see him with you, Mounika TIMMONS [facility] Ornelas MD, Jewel Mike Chief of Cardiology, [facility] Medical Director Calos Rueda [address], [address] [place] [address] Office [phone] Fax [phone]

## 2019-12-06 NOTE — PROGRESS NOTES
Neurology Progress Note Patient ID: 
Meg Vera [id number] 
71 y.o. 
1950 HISTORY OF PRESENT ILLNESS:  The patient is a 51-year-old pleasant white male with history of hypertension, hyperlipidemia, obstructive sleep apnea, coronary artery disease status post cardiac arrest, ventricular fibrillation status post CABG, ischemic cardiomyopathy status post ICD implantation. He was admitted with acute on chronic systolic congestive heart failure and is now status post LVAD implantation. Post surgery he has been noted to have generalized twitching of his extremities. He was noted to have digoxin toxicity two days ago, but his digoxin levels are now normal.  He also received some Restoril and had few doses of narcotic analgesics, but none in the last 24 hours. Per his wife, he was having twitches in the morning, but he did better in the afternoon, but this morning he again looks worse than what he was yesterday. Denies any headache, changes in vision, speech or focal motor sensory deficits. He has also not been able to put weight on his legs because of the tremor. Subjective:  
 Nurse and sitter present in the room. Jerks have improved since starting keppra 250 mg BID. CT of the head, unrevealing. Denies HA's, some double vision. Objective: All records in [facility] reviewed and noted ROS: 
Per HPI All other 12 pt ROS negative Meds: 
Current Facility-Administered Medications Medication Dose Route Frequency  levETIRAcetam (KEPPRA) 250 mg in 0.9% sodium chloride 100 mL IVPB  250 mg IntraVENous Q12H  
 albumin human 25% (BUMINATE) solution 25 g  25 g IntraVENous Q6H  
 allopurinol (ZYLOPRIM) tablet 100 mg  100 mg Oral DAILY  ipratropium (ATROVENT) 0.02 % nebulizer solution 0.5 mg  0.5 mg Nebulization Q8H RT  
 sildenafil (REVATIO) 2 mg/mL oral suspension 20 mg  20 mg Oral Q8H  
 pantoprazole (PROTONIX) 40 mg in 0.9% sodium chloride 10 mL injection  40 mg IntraVENous DAILY  aspirin (ASA) suppository 75 mg  75 mg Rectal DAILY  arformoterol (BROVANA) neb solution 15 mcg  15 mcg Nebulization BID RT And  
 budesonide (PULMICORT) 500 mcg/2 ml nebulizer suspension  500 mcg Nebulization BID RT  
 albuterol (PROVENTIL VENTOLIN) nebulizer solution 2.5 mg  2.5 mg Nebulization Q4H RT  
 milrinone (PRIMACOR) 20 MG/100 ML D5W infusion  0.2 mcg/kg/min IntraVENous CONTINUOUS  piperacillin-tazobactam (ZOSYN) 3.375 g in 0.9% sodium chloride (MBP/ADV) 100 mL  3.375 g IntraVENous Q8H  
 magnesium oxide (MAG-OX) tablet 400 mg  400 mg Oral DAILY  artificial saliva (MOUTH KOTE) 1 Spray  1 Spray Oral PRN  
 lactobac ac& pc-s.therm-b.anim (TIAN Q/RISAQUAD)  1 Cap Oral DAILY  oxyCODONE IR (ROXICODONE) tablet 5 mg  5 mg Oral Q6H PRN  
 balsam peru-castor oil (VENELEX) ointment   Topical TID  [Held by provider] tamsulosin (FLOMAX) capsule 0.4 mg  0.4 mg Oral DAILY  insulin lispro (HUMALOG) injection   SubCUTAneous AC&HS  Warfarin MD/NP dosing   Other PRN  
 epoetin yvonne-epbx (RETACRIT) injection 20,000 Units  20,000 Units SubCUTAneous Q7D  
 [Held by provider] lidocaine (LIDODERM) 5 % patch 1 Patch  1 Patch TransDERmal Q24H  
 sodium chloride (NS) flush 5-40 mL  5-40 mL IntraVENous Q8H  
 sodium chloride (NS) flush 5-40 mL  5-40 mL IntraVENous PRN  
 acetaminophen (TYLENOL) tablet 650 mg  650 mg Oral Q6H PRN  
 naloxone (NARCAN) injection 0.4 mg  0.4 mg IntraVENous PRN  
 ondansetron (ZOFRAN) injection 4 mg  4 mg IntraVENous Q4H PRN  
 [Held by provider] senna-docusate (PERICOLACE) 8.6-50 mg per tablet 1 Tab  1 Tab Oral DAILY  [Held by provider] polyethylene glycol (MIRALAX) packet 17 g  17 g Oral DAILY  bisacodyl (DULCOLAX) tablet 5 mg  5 mg Oral DAILY PRN  
 [Held by provider] sucralfate (CARAFATE) tablet 1 g  1 g Oral AC&HS  influenza vaccine 2019-20 (6 mos+)(PF) (FLUARIX/FLULAVAL/FLUZONE QUAD) injection 0.5 mL  0.5 mL IntraMUSCular PRIOR TO DISCHARGE  
  hydrALAZINE (APRESOLINE) 20 mg/mL injection 20 mg  20 mg IntraVENous Q6H PRN  
 dextrose 10 % infusion 125-250 mL  125-250 mL IntraVENous PRN Imaging: 
 
 
Lab Review Recent Results (from the past 24 hour(s)) GLUCOSE, POC Collection Time: 12/05/19 12:35 PM  
Result Value Ref Range Glucose (POC) 117 (H) 65 - 100 mg/dL Performed by Loudrter METABOLIC PANEL, BASIC Collection Time: 12/05/19  3:50 PM  
Result Value Ref Range Sodium 141 136 - 145 mmol/L Potassium 3.7 3.5 - 5.1 mmol/L Chloride 103 97 - 108 mmol/L  
 CO2 33 (H) 21 - 32 mmol/L Anion gap 5 5 - 15 mmol/L Glucose 136 (H) 65 - 100 mg/dL BUN 23 (H) 6 - 20 MG/DL Creatinine 2.05 (H) 0.70 - 1.30 MG/DL  
 BUN/Creatinine ratio 11 (L) 12 - 20 GFR est AA 39 (L) >60 ml/min/1.73m2 GFR est non-AA 32 (L) >60 ml/min/1.73m2 Calcium 9.2 8.5 - 10.1 MG/DL  
GLUCOSE, POC Collection Time: 12/05/19  5:11 PM  
Result Value Ref Range Glucose (POC) 104 (H) 65 - 100 mg/dL Performed by Jody Felix GLUCOSE, POC Collection Time: 12/05/19 10:17 PM  
Result Value Ref Range Glucose (POC) 92 65 - 100 mg/dL Performed by Art Bright METABOLIC PANEL, COMPREHENSIVE Collection Time: 12/06/19  3:39 AM  
Result Value Ref Range Sodium 143 136 - 145 mmol/L Potassium 3.9 3.5 - 5.1 mmol/L Chloride 107 97 - 108 mmol/L  
 CO2 31 21 - 32 mmol/L Anion gap 5 5 - 15 mmol/L Glucose 98 65 - 100 mg/dL BUN 24 (H) 6 - 20 MG/DL Creatinine 2.38 (H) 0.70 - 1.30 MG/DL  
 BUN/Creatinine ratio 10 (L) 12 - 20 GFR est AA 33 (L) >60 ml/min/1.73m2 GFR est non-AA 27 (L) >60 ml/min/1.73m2 Calcium 9.3 8.5 - 10.1 MG/DL Bilirubin, total 1.1 (H) 0.2 - 1.0 MG/DL  
 ALT (SGPT) 7 (L) 12 - 78 U/L  
 AST (SGOT) 14 (L) 15 - 37 U/L Alk. phosphatase 98 45 - 117 U/L Protein, total 6.8 6.4 - 8.2 g/dL Albumin 3.2 (L) 3.5 - 5.0 g/dL Globulin 3.6 2.0 - 4.0 g/dL A-G Ratio 0.9 (L) 1.1 - 2.2 MAGNESIUM Collection Time: 12/06/19  3:39 AM  
Result Value Ref Range Magnesium 2.4 1.6 - 2.4 mg/dL CBC W/O DIFF Collection Time: 12/06/19  3:39 AM  
Result Value Ref Range WBC 5.1 4.1 - 11.1 K/uL  
 RBC 2.79 (L) 4.10 - 5.70 M/uL HGB 8.3 (L) 12.1 - 17.0 g/dL HCT 27.9 (L) 36.6 - 50.3 % .0 (H) 80.0 - 99.0 FL  
 MCH 29.7 26.0 - 34.0 PG  
 MCHC 29.7 (L) 30.0 - 36.5 g/dL  
 RDW 18.1 (H) 11.5 - 14.5 % PLATELET 386 120 - 953 K/uL MPV 9.3 8.9 - 12.9 FL  
 NRBC 0.0 0  WBC ABSOLUTE NRBC 0.00 0.00 - 0.01 K/uL PROTHROMBIN TIME + INR Collection Time: 12/06/19  3:39 AM  
Result Value Ref Range INR 4.0 (H) 0.9 - 1.1 Prothrombin time 37.4 (H) 9.0 - 11.1 sec PTT Collection Time: 12/06/19  3:39 AM  
Result Value Ref Range aPTT 47.0 (H) 22.1 - 32.0 sec  
 aPTT, therapeutic range     58.0 - 77.0 SECS  
DIGOXIN Collection Time: 12/06/19  3:39 AM  
Result Value Ref Range Digoxin level 1.7 0.90 - 2.00 NG/ML  
LACTIC ACID Collection Time: 12/06/19  3:39 AM  
Result Value Ref Range Lactic acid 1.1 0.4 - 2.0 MMOL/L  
LD Collection Time: 12/06/19  3:39 AM  
Result Value Ref Range  85 - 241 U/L  
NT-PRO BNP Collection Time: 12/06/19  3:39 AM  
Result Value Ref Range NT pro-BNP 10,415 (H) <125 PG/ML  
GLUCOSE, POC Collection Time: 12/06/19  7:30 AM  
Result Value Ref Range Glucose (POC) 100 65 - 100 mg/dL Performed by Bubba Lugo Exam: 
Visit Vitals BP (!) 74/56 Pulse 79 Temp 98.4 °F (36.9 °C) Resp 26 Ht 6' 2\" (1.88 m) Wt 81.6 kg (179 lb 14.3 oz) SpO2 96% BMI 23.10 kg/m² Physical Exam 
Vitals signs and nursing note reviewed. Constitutional:   
   Appearance: He is well-developed. Eyes:  
   General: Lids are normal.  
   Extraocular Movements:  
   Right eye: Abnormal extraocular motion and nystagmus present.   
   Visual Fields: Right eye visual fields normal and left eye visual fields normal.  
 Comments:  Right eyes no gaze to the left Neck: Musculoskeletal: Normal range of motion. Cardiovascular:  
   Rate and Rhythm: Normal rate. Heart sounds: Normal heart sounds. Neurological:  
   Mental Status: He is alert. Cranial Nerves: Dysarthria present. Motor: Tremor present. Coordination: Finger-Nose-Finger Test abnormal.  
   Deep Tendon Reflexes: Reflexes are normal and symmetric. Comments:  Mild tremor Bilateral hands Assessment:  
 
Patient Active Problem List  
Diagnosis Code  Paroxysmal atrial fibrillation ([facility]) I48.0  Acute on chronic systolic CHF (congestive heart failure) ([facility]) I50.23  Systolic CHF, chronic ([facility]) I50.22  
 Peripheral vascular disease ([facility]) I73.9  Acute decompensated heart failure ([facility]) I50.9 Plan: A 54-year-old male with acute on chronic congestive heart failure, status post left ventricular assist device who has what appears to be symptomatic generalized myoclonus. On exam I noticed some right eye left gaze neglect and dysarthria. Concern Third nerve palsy. He may need a MRI, MRA/ CTA pending Dr. Desi Edward exam.  CT of the head unrevealing. Continue keppra 250 mg BID for myoclonus jerks.   
 
 
Signed: 
Lesli Walker NP 
12/6/2019 
11:31 AM

## 2019-12-06 NOTE — PROGRESS NOTES
Problem: Mobility Impaired (Adult and Pediatric) Goal: *Acute Goals and Plan of Care (Insert Text) Description FUNCTIONAL STATUS PRIOR TO ADMISSION: Patient was modified independent using a single point cane for functional mobility. Patient reports an increasingly sedentary lifestyle 2* fatigue and SOB/dyspnea. Retired (so is his wife). Patient reports x 3 falls within the last couple of weeks. Patient is wearing either nasal cannula or CPAP at night. LVAD work-up has been initiated. HOME SUPPORT PRIOR TO ADMISSION: The patient lived with his wife, but did not require physical assistance. Physical Therapy Goals: 
 
Re-evaluation completed 11/20/2019 and new goals formulated following LVAD implantation. Reviewed and cont 12/3/2019 1. Patient will move from supine to sit and sit to supine, scoot up and down and roll side to side in bed with minimal assistance/contact guard assist within 7 days. 2.  Patient will perform sit to/from stand with minimal assistance/contact guard assist within 7 days. 3.  Patient will ambulate 150 feet with least restrictive assistive device and minimal assistance/contact guard assist within 7 days. 4.  Patient will ascend/descend 4 stairs with handrail(s) with minimal assistance/contact guard assist within 7 days. 5.  Patient will perform cardiac exercises per protocol with supervision within 7 days. 6.  Patient will verbally and functionally recall 3/3 sternal precautions within 7 days. 7.  Patient will perform power exchange for power module to/from battery with moderate assistance  within 7 days. Initiated 10/27/2019; updated 11/15/2019 1. Patient will move from supine to sit and sit to supine, scoot up and down and roll side to side in bed with independence within 7 days. 2.  Patient will perform sit to/from stand with supervision/set-up within 7 days.  
3.  Patient will ambulate 200 feet with least restrictive assistive device and supervision/set-up within 7 days. 4.  Patient will ascend/descend 4 stairs with  handrail(s) with supervision/set-up within 7 days for functional strengthening and community reintegration. Zuri Ruelas 5.  Patient will verbally and functionally recall 3/3 sternal precautions within 7 days in preparation for LVAD implantation. 6.  Patient will perform a mock power exchange for power module to/from battery with supervision/set-up within 7 days in preparation for LVAD implantation. 1.  Patient will move from supine to sit and sit to supine, scoot up and down and roll side to side in bed with independence within 7 days. 2.  Patient will perform sit to/from stand with supervision/set-up within 7 days. 3.  Patient will ambulate 150 feet with least restrictive assistive device and supervision/set-up within 7 days. 4.  Patient will ascend/descend 4 stairs with  handrail(s) with supervision/set-up within 7 days for functional strengthening and community reintegration. Zuri Ruelas 5.  Patient will verbally and functionally recall 3/3 sternal precautions within 7 days in preparation for LVAD implantation. 6.  Patient will perform a mock power exchange for power module to/from battery with supervision/set-up within 7 days in preparation for LVAD implantation. Outcome: Progressing Towards Goal 
  
PHYSICAL THERAPY TREATMENT Patient: Natasha Flanagan (75 y.o. male) Date: 12/6/2019 Diagnosis: Acute decompensated heart failure ([facility] [address] [I50.9] <principal problem not specified> Procedure(s) (LRB): LEFT BRACHIAL THROMBECTOMY (Left) 11 Days Post-Op Precautions: Fall, Sternal, No Chest Compressions Chart, physical therapy assessment, plan of care and goals were reviewed. ASSESSMENT Patient continues with skilled PT services and is slowly progressing towards goals.  Noted new onset of patient's inability to cross midline with R eye and nystagmus in L eye, resulting in diplopia (neurologist updated). Myoclonic jerks/tremors slightly decreased compared to yesterday (now greater with exertion, less prominent at rest). Truncal ataxia and poor UE coordination noted. Observed confusion with intermittent tangential/ nonsensical speech. Patient very impulsive this date, requiring a sitter and OT assistance. Patient sat EOB x 15 minutes with fair trunk control for neurological assessment and review of LVAD parts (poor retention). 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 Current Level of Function Impacting Discharge (mobility/balance): Unsafe to participate in standing/ambulation trials this date. Other factors to consider for discharge: HIGH fall risk PLAN : 
Patient continues to benefit from skilled intervention to address the above impairments. Continue treatment per established plan of care. to address goals. Recommendation for discharge: (in order for the patient to meet his/her long term goals) Therapy 3 hours per day 5-7 days per week This discharge recommendation: 
Has been made in collaboration with the attending provider and/or case management IF patient discharges home will need the following DME: to be determined (TBD) SUBJECTIVE:  
Patient stated I'm seeing two heads.  OBJECTIVE DATA SUMMARY:  
Critical Behavior: 
Neurologic State: Alert Orientation Level: Oriented to person, Disoriented to place, Disoriented to situation Cognition: Decreased attention/concentration, Decreased command following Safety/Judgement: Decreased awareness of environment Functional Mobility Training: 
Bed Mobility: 
  
Supine to Sit: Moderate assistance(Extremely impulsive) Sit to Supine: Moderate assistance Scooting: Minimum assistance(VCs for sternal precautions) Balance: 
Sitting: Impaired; Without support Sitting - Static: Fair (occasional) Sitting - Dynamic: Fair (occasional) Activity Tolerance:  
Poor Please refer to the flowsheet for vital signs taken during this treatment. After treatment patient left in no apparent distress:  
Supine in bed, Call bell within reach, Side rails x 3, and Sitter at bedside COMMUNICATION/COLLABORATION:  
The patients plan of care was discussed with: Occupational Therapist, Registered Nurse, Physician, , and Rehabilitation Attendant Pelon Argueta PT, DPT Time Calculation: 30 mins

## 2019-12-06 NOTE — PROGRESS NOTES
Problem: Dysphagia (Adult) Goal: *Acute Goals and Plan of Care (Insert Text) Description Speech Pathology Re-evaluation 12/3/2019 1. Patient will tolerate least restrictive diet without adverse effects within 7 days. Re-evaluation 11/26/2019 1. Patient will tolerate regular/thin liquid diet with no overt s/s aspiration within 7 days Re-evaluation 11/20/2019 1. Patient will tolerate mechanical soft/thin liquid diet with no overt s/s aspiration within 7 days. MET Initiated 10/28/19 1. Patient will tolerate regular diet/thin liquids without overt s/s of aspiration within 7 days MET 2. Patient will participate in [facility] for further objective assessment of swallow physiology within 7 days (once medically stable from Impella/cardiac sx standpoint) NOT MET; discontinue Outcome: Progressing Towards Goal 
 
SPEECH LANGUAGE PATHOLOGY DYSPHAGIA TREATMENT Patient: Donny Shaikh (75 y.o. male) Date: 12/6/2019 Diagnosis: Acute decompensated heart failure ([facility] [address] [I50.9] <principal problem not specified> Procedure(s) (LRB): LEFT BRACHIAL THROMBECTOMY (Left) 11 Days Post-Op Precautions: Aspiration Fall, Sternal 
 
ASSESSMENT: 
Patient presents with swallow skills consistent with MBS completed 12/3/19. Pharyngeal dyspharyngeal dysphagia characterized by pharyngeal swallow delay and reduced laryngeal via palpation. Per MBS patient with aspiration of thin liquids. Aspiration was mild-moderate amount. Cough was triggered however patient unable to clear aspirated material due to weak cough/LVC paralysis. Further patient taking large gulps despite hand over hand and verbal cues for small sips. Patient at increased risk for aspiration given dysphagia, LVC paralysis, possible new neuro symptoms, impaired mentation and generalized weakness. Given bedside presentation this date, recent MBS and work-up for neuro symptoms feel patient is safe for nectar full liquid diet.   Patient/family will benefit from discussion with MD regarding aspiration risk/diet/POC. PLAN: 
Recommendations and Planned Interventions: 
Nectar full liquid diet SMALL sips (Use spoon or medicine cup) No straws Sit up for all PO Consider palliative care consult MD discussion with family regarding aspiration risk/PO diet/views on alternative nutrition Patient continues to benefit from skilled intervention to address the above impairments. Continue treatment per established plan of care. Discharge Recommendations: To Be Determined SUBJECTIVE:  
Patient stated Alright. Patient with eyes open inconsistently but nearly consistent talking. RN and sitter at bedside. ENT at bedside prior to session. OBJECTIVE:  
Cognitive and Communication Status: 
Neurologic State: Alert Orientation Level: Oriented to person, Disoriented to place, Disoriented to situation Cognition: Decreased attention/concentration, Decreased command following Perception: Appears intact Perseveration: Perseverates during conversation Safety/Judgement: Decreased awareness of environment Dysphagia Treatment: 
Oral Assessment: 
Oral Assessment Labial: No impairment Oral Hygiene: Mildly dry oral mucosa Lingual: No impairment Velum: No impairment Mandible: No impairment P.O. Trials: 
Patient Position: Upright in bed Vocal quality prior to P.O.: Hoarse Consistency Presented: Nectar thick liquid;Puree How Presented: Self-fed/presented;SLP-fed/presented;Cup/sip;Cup/gulp; Spoon Bolus Acceptance: No impairment Bolus Formation/Control: No impairment Propulsion: No impairment Oral Residue: None Initiation of Swallow: Delayed (# of seconds) Laryngeal Elevation: Decreased Aspiration Signs/Symptoms: None Effective Modifications: Small sips and bites Oral Phase Severity: No impairment Pharyngeal Phase Severity : Moderate After treatment:  
Nursing notified and Caregiver / family present COMMUNICATION/EDUCATION:  
Patient was educated regarding role of SLP, diet and POC. The patient's plan of care including recommendations, planned interventions, and recommended diet changes were discussed with: Registered Nurse, Physician, and Certified Nursing Assistant/Patient Care Technician. NICO De Los Santos Time Calculation: 25 mins

## 2019-12-06 NOTE — PROGRESS NOTES
0730: Bedside and Verbal shift change report given to MANJEET RN (oncoming nurse) by Win Pitt RN (offgoing nurse). Report included the following information SBAR, Kardex, Intake/Output, MAR, Recent Results, Med Rec Status and Cardiac Rhythm Paced. Sitter at bedside d/t patient's increased confusion. Gtts verified with night shift RN. Pt currently on milrinone gtt at 0.2. 
0915: Spoke with TIERRA Herrera regarding patient's nutrition status. Orders to place dobhoff today, but to wait to see if ENT is going to work with patient first. Will page. 0930: Paged ENT physician to see if patient is going to get scoped today per TIERRA Herrera. Also spoke with speech therapy regarding re-evaluating patient's swallow issues. 1020: TIERRA Herrera and MD Agapito at bedside. Orders to place MAC and Revere but no art line. 1105: Pt's wife at bedside and daughter. Update given. 1110: Received update from PT/OT. Both therapists noticed some neuro deficits. Pt's right eye is abducting and not going midline, nystagmus in left eye and pt has double vision vertically. Pt has family history of cerebellar ataxia. Mentioned this to TIERRA Herrera and paged neuro NP that rounded on patient. 1130: Spoke with Venkata Bunch MD regarding patient's neuro deficits. MD explained he will look at patient and notify me for new orders. 1325: Anesthesia MD at bedside. R MAC and Revere placed. STAT CXR ordered to verify placement. 1400: MD Ebony at bedside. Pt noted to have CN 3 deficit. MD will put in orders to obtain repeat CT tomorrow AM as patient is unable to have MRI. 
1420: Robi Soriano MD at bedside to scope patient. Pt found to have issues with L vocal cord. MD will put in note. No new orders received. 1435: Speech therapist at bedside to evaluate swallowing. Pt okay to have nectar thick fluids. 1500: Dobhoff placed in R nares. KUB ordered for verification.  
1800: Osmolite 1.5 trickle feeds started at 25 mL/hr with 30 q4H water flushes. Will advance tube feed Q8H per order. Khadra Melo, TIERRA okay with placement of dobhoff over pylorus. Will hopefully migrate into duodenum. Will get repeat KUB in AM. Orders also received to obtain repeat head CT tomorrow AM for neuro changes noted today. 1930: Bedside and Verbal shift change report given to KATRIN Morgan (oncoming nurse) by KATRIN BURROUGHS (offgoing nurse). Report included the following information SBAR, Kardex, Intake/Output, MAR, Recent Results, Med Rec Status and Cardiac Rhythm Paced.

## 2019-12-06 NOTE — PROCEDURES
Central Line Placement;MAC cath with [facility] Start time: 12/6/2019 1:32 PM 
End time: 12/6/2019 1:46 PM 
Performed by: Kenyetta Allen MD 
Authorized by: Kenyetta Allen MD  
 
Indications: vascular access and central pressure monitoring Preanesthetic Checklist: patient identified, risks and benefits discussed, anesthesia consent, site marked, patient being monitored and timeout performed Timeout Time: 13:32 Pre-procedure: All elements of maximal sterile barrier technique followed? Yes   
2% Chlorhexidine for cutaneous antisepsis, Hand hygiene performed prior to catheter insertion and Ultrasound guidance Sterile Ultrasound Technique followed?: Yes Procedure:  
Prep:  Chlorhexidine Orientation:  Right Patient position:  Trendelenburg Catheter type:  Double lumen Catheter size:  9 Fr Catheter length:  16 cm and 12 cm Number of attempts:  1 Successful placement: Yes Assessment:  
Post-procedure:  Catheter secured, sterile dressing applied and sterile dressing with CHG applied Assessment:  Blood return through all ports, free fluid flow, placement verified by x-ray and guidewire removal verified Insertion:  Uncomplicated Patient tolerance:  Patient tolerated the procedure well with no immediate complications Under sterile conditions PAC advanced to proper position all appropriate wave forms visualized

## 2019-12-06 NOTE — PROGRESS NOTES
Met with Shin Walters and Mario Rios (patient's son) yesterday to touch base. They were accompanied by two of Rita's close friends and the  from her [employer].  offered emotional support - acknowledged this has been a lengthy hospitalization for Morinzachery King and Shin Walters. Talked with Shin Walters about the LVAD patient/family holiday party this Saturday evening. Invited her to meet with other caregivers of VAD patients. Also talked with Shin Walters about the very high probability of Sona needing inpatient rehabilitation prior to going home. Acknowledged U in [facility] as well as [facility] in [facility] are the only LVAD trained inpatient rehabilitation centers local to [place]. Encouraged her to visit/ call the admissions liaisons to help with determining the most appropriate placement. She plans to talk further with Janaeprice about this and was educated on what inpatient rehabilitation means (3 hours of PT/OT/SLP daily).  continuing to follow for plan of care. Mary Ann Jordan, MSW, LCSW Clinical  [address]

## 2019-12-06 NOTE — PROGRESS NOTES
2000 - I have reviewed and agree with the medications given, care rendered and documentation done by Jaylyn Mcdaniels RN.

## 2019-12-06 NOTE — PROGRESS NOTES
Pulmonary / Critical Care Progress Note Name: Jimmy Javier  MRN: [id number]  Date: 2019 Impression / Plan: Hypoxemic respiratory failure with dyspnea 2/2 to continued pulm edema and presumed transudative effusion - also at risk for aspiration - preop (LVAD spirometry with combined obstructive and restrictive physiology - good diuresis with milrinone and intermittent bumex - off of bumex gtt Systolic CHF with EF 82% s/p LVAD 19 Mediastinal adenopathy with vocal cord paralysis 
 
parox afib JOSE on cpap Anemia Metabolic encephalopathy 
 
--agree with pulmicort Vargas Cho and pao 
--NIPPV as needed Will see over the weekend as needed History:          
afeb Cutting back on bumex per renal with bump in creatinine Confusion - likely multifactorial - neurology following Abx per ID Vital Signs:      
Patient Vitals for the past 4 hrs: 
 Temp Pulse Resp SpO2 Weight 19 0700  85 24 100 %   
19 0600  82 26 98 %   
19 0545     81.6 kg (179 lb 14.3 oz) 19 0500  83 20 98 %   
19 0400 98.2 °F (36.8 °C) 83 29 95 %  Temp (24hrs), Av.2 °F (36.8 °C), Min:98.1 °F (36.7 °C), Max:98.3 °F (36.8 °C) CVP:     CVP (mmHg): 7 mmHg (19 0700) Intake/Output Summary (Last 24 hours) at 2019 0137 Last data filed at 2019 0700 Gross per 24 hour Intake 1455.16 ml Output 1388 ml Net 67.16 ml Blood Sugar: 
Lab Results Component Value Date/Time Glucose (POC) 92 2019 10:17 PM  
 Glucose (POC) 104 (H) 2019 05:11 PM  
 Glucose (POC) 117 (H) 2019 12:35 PM  
 Glucose (POC) 99 2019 07:06 AM  
 Glucose (POC) 105 (H) 2019 09:34 PM  
 
 
Physical Exam:     
NC O2 No distress NC AT 
MMM No accessory use Diminished bs at bases, few rales RRR Soft Warm and dry Trace edema Data Review:     
Radiology images independently viewed CXR - decreased edema and no increase in effusion Labs: 
Lab: 
Recent Labs 12/06/19 
8829 12/05/19 
1550 12/05/19 
1036 12/05/19 
0321 12/04/19 
4647 12/04/19 
9495 WBC 5.1  --   --  5.0  --  5.3 HGB 8.3*  --   --  9.1*  --  9.0*  
  --   --  203  --  235  141 140 140  --  138  
K 3.9 3.7 4.6 3.0*  --  4.1  103 103 100  --  102 CO2 31 33* 31 31  --  29 BUN 24* 23* 20 19  --  20  
CREA 2.38* 2.05* 1.92* 1.52*  --  1.69* GLU 98 136* 116* 110*  --  91  
CA 9.3 9.2 9.6 9.2  --  9.1 MG 2.4  --   --  2.3  --  2.5* INR 4.0*  --   --  2.8*  --  2.5*  
TROIQ  --   --  0.19*  --  0.08*  --   
CPK  --   --  23*  --  23*  --   
TBILI 1.1*  --   --  1.1*  --  0.9 SGOT 14*  --   --  11*  --  13* LAC 1.1  --   --   --  1.0  --   
      
ABG: 
Recent Labs 12/05/19 
1102 12/04/19 
1109 12/04/19 
9066 PHI 7.511* 7.465* 7.413 PCO2I 39.7 39.1 42.3 PO2I 167* 91 76* HCO3I 31.7* 28.1* 27.0*  
SO2I 100* 98* 95 Microbiology: 
Results Procedure Component Value Units Date/Time CULTURE, RESPIRATORY/SPUTUM/BRONCH Kevin Freud STAIN [[id number]]  (Abnormal) Collected:  11/27/19 1110 Order Status:  Completed Specimen:  Sputum Updated:  11/29/19 1325 Special Requests: NO SPECIAL REQUESTS     
  GRAM STAIN FEW WBCS SEEN     
   NO EPITHELIAL CELLS SEEN     
   NO DEFINITE ORGANISM SEEN Culture result:    
  HEAVY ENTEROCOCCUS SPECIES NOTED  
     
   LIGHT YEAST     
 CULTURE, RESPIRATORY/SPUTUM/BRONCH Cristi Iraheta [[id number]] Collected:  11/25/19 1815 Order Status:  Canceled Specimen:  Sputum URINE CULTURE HOLD SAMPLE [[id number]] Collected:  11/25/19 1505 Order Status:  Completed Specimen:  Urine from Serum Updated:  11/25/19 1647 Urine culture hold URINE ON HOLD IN MICROBIOLOGY DEPT FOR 3 DAYS. IF UNPRESERVED URINE IS SUBMITTED, IT CANNOT BE USED FOR ADDITIONAL TESTING AFTER 24 HRS, RECOLLECTION WILL BE REQUIRED. Enid Lindquist MD

## 2019-12-06 NOTE — PROGRESS NOTES
[facility] VAD/Heart Failure Progress Note Admit Date: 10/25/2019 POD:  11 Days Post-Op Procedure:  Procedure(s): LEFT BRACHIAL THROMBECTOMY Subjective:  
Mild dyspnea, fatigue, and weakness; still brittany confusion; less tremeors Objective:  
Vitals: 
Blood pressure (!) 74/56, pulse 80, temperature 98.4 °F (36.9 °C), resp. rate 18, height 6' 2\" (1.88 m), weight 179 lb 14.3 oz (81.6 kg), SpO2 99 %. Temp (24hrs), Av.3 °F (36.8 °C), Min:98.1 °F (36.7 °C), Max:98.4 °F (36.9 °C) Hemodynamics: 
 CO: CO (l/min): 5.4 l/min CI: CI (l/min/m2): 2.5 l/min/m2 CVP: CVP (mmHg): 6 mmHg (19 1000) SVR: SVR (dyne*sec)/cm5: 1067 (dyne*sec)/cm5 (19 1200) PAP Systolic: PAP Systolic: 50 (47 6258) PAP Diastolic: PAP Diastolic: 30 (76/43/09 7353) PVR:   
 SV02: SVO2 (%): 45 % (19 1200) SCV02: SCVO2 (%): 75 % (10/29/19 1900) VAD Interrogation: LVAD (Heartmate) Pump Speed (RPM): 2259 Pump Flow (LPM): 4.9 PI (Pulsitility Index): 3.6 Power: 4.2 MAP: 80  Test: Yes 
Back Up  at Bedside & Labeled: Yes Power Module Test: Yes Driveline Site Care: No 
Driveline Dressing: Clean, Dry, and Intact EKG/Rhythm:   
 
Extubation Date / Time:  
 
CT Output:  
 
Ventilator: 
Ventilator Volumes Vt Set (ml): 550 ml (19 08) Vt Exhaled (Machine Breath) (ml): 639 ml (19 0826) Vt Spont (ml): 437 ml (19 1035) Ve Observed (l/min): 7.25 l/min (19 1035) Oxygen Therapy: 
Oxygen Therapy O2 Sat (%): 99 % (19 1000) Pulse via Oximetry: 79 beats per minute (19 1000) O2 Device: Nasal cannula (19 08) O2 Flow Rate (L/min): 2 l/min (19 08) FIO2 (%): 50 % (19 103) CXR: 
 
Admission Weight: Last Weight Weight: 192 lb 10.9 oz (87.4 kg) Weight: 179 lb 14.3 oz (81.6 kg) Intake / Output / Drain: 
Current Shift: 701 - 1900 In: 215.6 [I.V.:215.6] Out: 172 [Urine:172] Last 24 hrs.:  
 
Intake/Output Summary (Last 24 hours) at 12/6/2019 1059 Last data filed at 12/6/2019 1000 Gross per 24 hour Intake 1349.16 ml Output 1445 ml Net -95.84 ml Recent Labs 12/05/19 
1036 12/04/19 
0933 CPK 23* 23* CKMB 1.8 2.0  
TROIQ 0.19* 0.08* Recent Labs 12/06/19 
0066 12/05/19 
1550 12/05/19 
1036 12/05/19 
0321 12/04/19 
0315  141 140 140 138  
K 3.9 3.7 4.6 3.0* 4.1  
CO2 31 33* 31 31 29 BUN 24* 23* 20 19 20 CREA 2.38* 2.05* 1.92* 1.52* 1.69* GLU 98 136* 116* 110* 91 MG 2.4  --   --  2.3 2.5* WBC 5.1  --   --  5.0 5.3 HGB 8.3*  --   --  9.1* 9.0*  
HCT 27.9*  --   --  29.9* 29.6*   --   --  203 235 Recent Labs 12/06/19 
9160 12/05/19 
0321 12/04/19 
0315 INR 4.0* 2.8* 2.5* PTP 37.4* 27.2* 24.1* APTT 47.0* 40.4* 37.8* No lab exists for component: PBNP Current Facility-Administered Medications:  
  levETIRAcetam (KEPPRA) 250 mg in 0.9% sodium chloride 100 mL IVPB, 250 mg, IntraVENous, Q12H, Javed Beck MD, 250 mg at 12/06/19 0602   albumin human 25% (BUMINATE) solution 25 g, 25 g, IntraVENous, Q6H, Logan Kincaid MD, 25 g at 12/06/19 0805   allopurinol (ZYLOPRIM) tablet 100 mg, 100 mg, Oral, DAILY, Mary Herrera NP, Stopped at 12/05/19 1100   ipratropium (ATROVENT) 0.02 % nebulizer solution 0.5 mg, 0.5 mg, Nebulization, Q8H RT, Nona Andrews MD, 0.5 mg at 12/06/19 1023 
  sildenafil (REVATIO) 2 mg/mL oral suspension 20 mg, 20 mg, Oral, Q8H, Mary Herrera NP, Stopped at 12/05/19 1000   pantoprazole (PROTONIX) 40 mg in 0.9% sodium chloride 10 mL injection, 40 mg, IntraVENous, DAILY, Lizzie Herrera NP, 40 mg at 12/06/19 0845   aspirin (ASA) suppository 75 mg, 75 mg, Rectal, DAILY, Lizzie Herrera NP, Stopped at 12/06/19 0900 
  arformoterol (BROVANA) neb solution 15 mcg, 15 mcg, Nebulization, BID RT, 15 mcg at 12/05/19 1949 **AND** budesonide (PULMICORT) 500 mcg/2 ml nebulizer suspension, 500 mcg, Nebulization, BID RT, Basilia Herrera NP, 500 mcg at 12/06/19 1023   albuterol (PROVENTIL VENTOLIN) nebulizer solution 2.5 mg, 2.5 mg, Nebulization, Q4H RT, Basilia Herrera NP, 2.5 mg at 12/06/19 1022 
  milrinone (PRIMACOR) 20 MG/100 ML D5W infusion, 0.2 mcg/kg/min, IntraVENous, CONTINUOUS, Haim Andrews MD, Last Rate: 5.2 mL/hr at 12/06/19 0802, 0.2 mcg/kg/min at 12/06/19 2764   piperacillin-tazobactam (ZOSYN) 3.375 g in 0.9% sodium chloride (MBP/ADV) 100 mL, 3.375 g, IntraVENous, Q8H, Grant Carvalho MD, Last Rate: 25 mL/hr at 12/06/19 0804, 3.375 g at 12/06/19 1613   magnesium oxide (MAG-OX) tablet 400 mg, 400 mg, Oral, DAILY, Logan Kincaid MD, Stopped at 12/06/19 0900 
  artificial saliva (MOUTH KOTE) 1 Spray, 1 Spray, Oral, PRN, Sharon, Sabra Foy NP 
  lactobac ac& pc-s.therm-b.anim (TIAN Q/RISAQUAD), 1 Cap, Oral, DAILY, Chase Brandon MD, Stopped at 12/04/19 0900 
  oxyCODONE IR (ROXICODONE) tablet 5 mg, 5 mg, Oral, Q6H PRN, Linda Rios MD, 5 mg at 12/03/19 4719   balsam peru-castor oil (VENELEX) ointment, , Topical, TID, Haim Andrews MD 
  [Held by provider] tamsulosin (FLOMAX) capsule 0.4 mg, 0.4 mg, Oral, DAILY, Logan Kincaid MD, Stopped at 12/04/19 0900 
  insulin lispro (HUMALOG) injection, , SubCUTAneous, AC&HS, Shana Sims NP, Stopped at 12/02/19 1630   Warfarin MD/NP dosing, , Other, PRN, Mounika Altman MD 
  epoetin yvonne-epbx (RETACRIT) injection 20,000 Units, 20,000 Units, SubCUTAneous, Q7D, Elizabeth Guillaume MD, 20,000 Units at 12/03/19 8407   [Held by provider] lidocaine (LIDODERM) 5 % patch 1 Patch, 1 Patch, TransDERmal, Q24H, Shana Sims NP, 1 Patch at 12/03/19 7508   sodium chloride (NS) flush 5-40 mL, 5-40 mL, IntraVENous, Q8H, Linda Rios MD, 10 mL at 12/06/19 5727   sodium chloride (NS) flush 5-40 mL, 5-40 mL, IntraVENous, PRN, Freddie Javier MD 
  acetaminophen (TYLENOL) tablet 650 mg, 650 mg, Oral, Q6H PRN, Freddie Javier MD, 650 mg at 12/03/19 0857 
  naloxone [facility]) injection 0.4 mg, 0.4 mg, IntraVENous, PRN, Freddie Javier MD 
  ondansetron [facility]) injection 4 mg, 4 mg, IntraVENous, Q4H PRN, Freddie Javier MD, 4 mg at 11/20/19 2000   [Held by provider] senna-docusate (PERICOLACE) 8.6-50 mg per tablet 1 Tab, 1 Tab, Oral, DAILY, Freddie Javier MD, Stopped at 12/04/19 0900   [Held by provider] polyethylene glycol (MIRALAX) packet 17 g, 17 g, Oral, DAILY, Freddie Javier MD, Stopped at 12/04/19 0900 
  bisacodyl (DULCOLAX) tablet 5 mg, 5 mg, Oral, DAILY PRN, Freddie Javier MD 
  [Held by provider] sucralfate (CARAFATE) tablet 1 g, 1 g, Oral, AC&HS, Freddie Javier MD, Stopped at 12/04/19 0730 
  influenza vaccine 2019-20 (6 mos+)(PF) (FLUARIX/FLULAVAL/FLUZONE QUAD) injection 0.5 mL, 0.5 mL, IntraMUSCular, PRIOR TO DISCHARGE, Tonya Altman MD 
  hydrALAZINE (APRESOLINE) 20 mg/mL injection 20 mg, 20 mg, IntraVENous, Q6H PRN, LeviAlexiin B, NP, 20 mg at 11/19/19 0547 
  dextrose 10 % infusion 125-250 mL, 125-250 mL, IntraVENous, PRN, Jennifer Mitchell MD, Stopped at 11/18/19 0700 A/P 
  
S/P LVAD - good flows Need for anti-coagulation - coumadin DM - insulin RV dysfunction - milrinone 
  
Risk of morbidity and mortality - high Medical decision making - high complexity Signed By: Norman Lundy MD

## 2019-12-06 NOTE — PROGRESS NOTES
Problem: Falls - Risk of 
Goal: *Absence of Falls Description Document Ashlie Colón Fall Risk and appropriate interventions in the flowsheet. Outcome: Progressing Towards Goal 
Note: Fall Risk Interventions: 
Mobility Interventions: Communicate number of staff needed for ambulation/transfer Mentation Interventions: Evaluate medications/consider consulting pharmacy Medication Interventions: Evaluate medications/consider consulting pharmacy Elimination Interventions: Toileting schedule/hourly rounds History of Falls Interventions: Consult care management for discharge planning Problem: Pain Goal: *Control of Pain Outcome: Progressing Towards Goal 
  
Problem: Pressure Injury - Risk of 
Goal: *Prevention of pressure injury Description Document Mich Scale and appropriate interventions in the flowsheet. Outcome: Progressing Towards Goal 
Note: Pressure Injury Interventions: 
Sensory Interventions: Assess changes in LOC, Assess need for specialty bed, Avoid rigorous massage over bony prominences, Chair cushion, Check visual cues for pain, Maintain/enhance activity level, Keep linens dry and wrinkle-free, Minimize linen layers, Monitor skin under medical devices, Pad between skin to skin, Pressure redistribution bed/mattress (bed type), Sit a 90-degree angle/use footstool if needed, Suspension boots, Turn and reposition approx. every two hours (pillows and wedges if needed), Use 30-degree side-lying position Moisture Interventions: Absorbent underpads, Apply protective barrier, creams and emollients, Limit adult briefs, Maintain skin hydration (lotion/cream), Minimize layers, Moisture barrier, Offer toileting Q_hr Activity Interventions: PT/OT evaluation, Chair cushion, Increase time out of bed Mobility Interventions: Chair cushion, Assess need for specialty bed, PT/OT evaluation Nutrition Interventions: Discuss nutritional consult with provider(NPO) Friction and Shear Interventions: Apply protective barrier, creams and emollients, HOB 30 degrees or less, Lift sheet, Lift team/patient mobility team, Minimize layers, Transferring/repositioning devices Problem: Infection - Risk of, Multi-drug Resistant Organism Colonization (MDRO) Goal: *Absence of MDRO colonization Outcome: Progressing Towards Goal 
Goal: *Absence of infection signs and symptoms Outcome: Progressing Towards Goal 
  
Problem: Heart Failure: Discharge Outcomes Goal: *Demonstrates ability to perform prescribed activity without shortness of breath or discomfort Outcome: Progressing Towards Goal 
  
Problem: Diabetes Self-Management Goal: *Disease process and treatment process Description Define diabetes and identify own type of diabetes; list 3 options for treating diabetes. Outcome: Progressing Towards Goal 
  
Problem: LVAD Post-op 5 to 7 Days Goal: *Hemodynamically stable Outcome: Progressing Towards Goal 
Goal: *Optimal pain control at patient's stated goal 
Outcome: Progressing Towards Goal 
Goal: *Incisions without signs and symptoms of wound complication Outcome: Progressing Towards Goal 
  
Problem: Impaired Skin Integrity/Pressure Injury Treatment Goal: *Improvement of Existing Pressure Injury Outcome: Progressing Towards Goal 
  
Problem: Impaired Skin Integrity/Pressure Injury Treatment Goal: *Improvement of Existing Pressure Injury Outcome: Progressing Towards Goal

## 2019-12-06 NOTE — PROGRESS NOTES
NUTRITION COMPLETE ASSESSMENT 
 
RECOMMENDATIONS:  
1. Agree with DHT placement with start of enteral feeds. Would benefit from nasal bridle. Recommend: - Osmolite 1.5 @ 25ml/hr advanced 15ml/hr q8hr to goal of 55ml/hr + 30ml flush q4hr 
 - pending fluid status adjust flush per renal up to: 160ml flush q4hr 2. Once at goal change to nocturnal feeds (see below for recommendations) 3. Diet advancement per SLP when alert and appropriate Interventions/Plan:  
Food/Nutrient Delivery:  (-) (-)     Initiate enteral nutrition Assessment:  
Reason for Assessment: Reassessment Diet: NPO Supplements: None Nutritionally Significant Medications: [x] Reviewed & Includes: albumin, allopurinol, retacrit, SSI, Bhakti-Q, keppra, mag ox, protonix, zosyn, milrinone drip; PRN: artifical saliva PRN, zofran Subjective: Confused. Spoke with RN. Objective: 
Pt admitted for CHF. PMHx: HTN, CKD, chronic systolic heart failure, depression, others noted. S/p removal of impella (placed 10/29) and LVAD placement on 11/18. Medications being adjusted for fluid status and flow for LVAD, renal following. Confusion over past few days. Possible ENT eval for VCP. Not appropriate for PO with confusion noted. Has been NPO x 3 days and intake has been less than optimal for duration of admit (some improvement after start of marinol/remeron which have been canceled). Agree with renal notes for  placement of DHT for small bowel feeds. Recommend: Osmolite 1.5 @ 25ml/hr advanced 15ml/hr q8hr to goal of 55ml/hr + 30ml flush q4hr. Provides: 1320ml, 1980kcal, 83g protein, 1003ml free fluid + 180ml flush = 1183ml fluid. Meets 100% energy and protein needs. Pending fluid status increase flush to 160ml q4hr. Once tolerating at goal volume change to nocturnal regimen for time off pump (provides same nutrition as above): Osmolite 1.5 @ 83ml/hr x 16hrs + 30ml flush q4hr. Severe wt loss over past 6 months. Severe muscle/fat wasting. Pt does meet criteria for severe malnutrition however low prealbumin is NOT a good sole indicator since with negative acute-phase protein and pt with chronic pro-inflammatory condition. Last 3 Recorded Weights in this Encounter 12/04/19 0869 12/05/19 1830 12/06/19 5023 Weight: 87 kg (191 lb 12.8 oz) 73.9 kg (162 lb 14.7 oz) 81.6 kg (179 lb 14.3 oz) Estimated Nutrition Needs:  
Kcals/day: 1970 Kcals/day(1970-2134kcal) Protein: 98 g(81-98g (1-1.2g/kg - JUAN J on CKD with malnutrition)) Fluid: 1970 ml(1ml/kcal or per renal/cardio) Based On: [place] St Jeor(x 1.2-1.3) Weight Used: Actual wt(81.3kg) Pt expected to meet estimated nutrient needs:  []   Yes     [x]  No [] Unable to predict at this time Nutrition Diagnosis:  
1. Unintended weight loss(malnutrition) related to CHF vs depression vs malignancy as evidenced by >10% weight loss x 6 months; severe muscle/fat wasting 2. Inadequate oral intake related to poor appetite, AMS as evidenced by less than 75% meals x 2 weeks; NPO with confusion x 3+ days Goals:   
 EN meeting at least 90% needs in 2-3 days; wt maintenance Monitoring & Evaluation: - Enteral/parenteral nutrition intake - Weight/weight change, Electrolyte and renal profile Previous Nutrition Goals Met:   No 
Previous Recommendations:    Progressing Education & Discharge Needs: 
 [x] None Identified 
 [] Identified and addressed [x] Participated in care plan, discharge planning, and/or interdisciplinary rounds Cultural, Jehovah's witness and ethnic food preferences identified: None Skin Integrity: []Intact  [x]Other: sternal wound Edema: []None [x]Other: trace Last BM: 12/3 - loose Food Allergies: [x]None []Other Diet Restrictions: Cultural/Rastafarian Preference(s): None Anthropometrics:   
Weight Loss Metrics 12/6/2019 10/25/2019 10/25/2019 10/25/2019 9/30/2019 9/18/2019 9/16/2019 Today's Wt 179 lb 14.3 oz - 193 lb 6.4 oz - 199 lb 14.4 oz 196 lb 199 lb BMI - 23.1 kg/m2 - 24.83 kg/m2 25.67 kg/m2 25.16 kg/m2 25.55 kg/m2 Weight Source: Bed Height: 6' 2\" (188 cm), Body mass index is 23.1 kg/m². IBW : 86.2 kg (190 lb), % IBW (Calculated): 96.32 % 
 ,   
 
Labs:   
Lab Results Component Value Date/Time Sodium 143 12/06/2019 03:39 AM  
 Potassium 3.9 12/06/2019 03:39 AM  
 Chloride 107 12/06/2019 03:39 AM  
 CO2 31 12/06/2019 03:39 AM  
 Glucose 98 12/06/2019 03:39 AM  
 BUN 24 (H) 12/06/2019 03:39 AM  
 Creatinine 2.38 (H) 12/06/2019 03:39 AM  
 Calcium 9.3 12/06/2019 03:39 AM  
 Magnesium 2.4 12/06/2019 03:39 AM  
 Phosphorus 3.3 11/27/2019 04:18 AM  
 Albumin 3.2 (L) 12/06/2019 03:39 AM  
 
Lab Results Component Value Date/Time Hemoglobin A1c 6.6 (H) 10/25/2019 02:56 PM  
 
 
Jorge Neely, RD [address] [place] , Pager #[phone] or [phone]

## 2019-12-06 NOTE — PROGRESS NOTES
[facility] 
 [address] [facility], [address] [facility] Phone: [phone]   [phone]   
  
Nephrology Progress Note Sona Kiser     1950     [id number] Date of Admission : 10/25/2019 
12/06/19 CC:  Follow up for JUAN J, CKD, Hyponatremia Assessment and Plan JUAN J on CKD 
- 2/2 need for diuresis --> Now IV very dry . Please avoid any Diamox  
- concerned about his significant RV dysfunction as well  
- Hold Bumex this morning and would be able to resume it this afternoon - 2 doses of IV albumin today - 6 hrs apart  
- daily CardioMEMS if possible - Please consider Post Pyloric DHT and start TF Hypokalemia  
- replete PRN Myoclonus and Encephalopathy : 
- per neuro - On Keppra 
  
Gross hematuria, BPH w/ retention: 
- off CBI pre VAD. cysto pre op showed catheter related Cystitis @ postr wall  
- neal had to be replaced due to urinary retention 
- flomax - on hold  
- keep neal for now until he is stable from CHF stand point Acute on Chronic HFrEF  
- EF 16-20%, NYHA Class IV , hx of VF arrest - s/p AICD 
- Impella insertion 10/29- removed 11/18  
- s/p LVAD placement on 11/18 
- remains in refractory CHF w/ severe degree RV dysfunction Hoarseness, vocal cord paralysis, mediastinal adenopathy: 
- will need eventual PET/CT 
  
L arm clot s/p thrombectomy on 11/25 JOSE on CPAP  
  
PAF s/p DCCV 6/19 
  
Anemia: 
- from blood loss s/p multiple blood products - s/p IV iron,  Repeat iron studies ok 
- on PAVEL q7 d Serratia and Enteroccocus UTI: 
- completed abx Above d/w the pt's RN Interval History:   
Events noted Maintaining UOP , off Bumex gtt Severe restless ness w/ Myoclonus and slIghtly better w/ kEPPRA Review of Systems: UNABLE TO obtain ROS Current Medications:  
Current Facility-Administered Medications Medication Dose Route Frequency  levETIRAcetam (KEPPRA) 250 mg in 0.9% sodium chloride 100 mL IVPB  250 mg IntraVENous Q12H  
 albumin human 25% (BUMINATE) solution 25 g  25 g IntraVENous Q6H  
 allopurinol (ZYLOPRIM) tablet 100 mg  100 mg Oral DAILY  potassium chloride 20 mEq in 50 ml IVPB  20 mEq IntraVENous TID  ipratropium (ATROVENT) 0.02 % nebulizer solution 0.5 mg  0.5 mg Nebulization Q8H RT  
 [Held by provider] bumetanide (BUMEX) injection 1 mg  1 mg IntraVENous BID  sildenafil (REVATIO) 2 mg/mL oral suspension 20 mg  20 mg Oral Q8H  
 pantoprazole (PROTONIX) 40 mg in 0.9% sodium chloride 10 mL injection  40 mg IntraVENous DAILY  aspirin (ASA) suppository 75 mg  75 mg Rectal DAILY  arformoterol (BROVANA) neb solution 15 mcg  15 mcg Nebulization BID RT And  
 budesonide (PULMICORT) 500 mcg/2 ml nebulizer suspension  500 mcg Nebulization BID RT  
 albuterol (PROVENTIL VENTOLIN) nebulizer solution 2.5 mg  2.5 mg Nebulization Q4H RT  
 milrinone (PRIMACOR) 20 MG/100 ML D5W infusion  0.2 mcg/kg/min IntraVENous CONTINUOUS  piperacillin-tazobactam (ZOSYN) 3.375 g in 0.9% sodium chloride (MBP/ADV) 100 mL  3.375 g IntraVENous Q8H  
 magnesium oxide (MAG-OX) tablet 400 mg  400 mg Oral DAILY  artificial saliva (MOUTH KOTE) 1 Spray  1 Spray Oral PRN  
 lactobac ac& pc-s.therm-b.anim (TIAN Q/RISAQUAD)  1 Cap Oral DAILY  oxyCODONE IR (ROXICODONE) tablet 5 mg  5 mg Oral Q6H PRN  
 balsam peru-castor oil (VENELEX) ointment   Topical TID  [Held by provider] tamsulosin (FLOMAX) capsule 0.4 mg  0.4 mg Oral DAILY  insulin lispro (HUMALOG) injection   SubCUTAneous AC&HS  Warfarin MD/NP dosing   Other PRN  
 epoetin yvonne-epbx (RETACRIT) injection 20,000 Units  20,000 Units SubCUTAneous Q7D  
 [Held by provider] lidocaine (LIDODERM) 5 % patch 1 Patch  1 Patch TransDERmal Q24H  
 sodium chloride (NS) flush 5-40 mL  5-40 mL IntraVENous Q8H  
 sodium chloride (NS) flush 5-40 mL  5-40 mL IntraVENous PRN  
 acetaminophen (TYLENOL) tablet 650 mg  650 mg Oral Q6H PRN  
  naloxone (NARCAN) injection 0.4 mg  0.4 mg IntraVENous PRN  
 ondansetron (ZOFRAN) injection 4 mg  4 mg IntraVENous Q4H PRN  
 [Held by provider] senna-docusate (PERICOLACE) 8.6-50 mg per tablet 1 Tab  1 Tab Oral DAILY  [Held by provider] polyethylene glycol (MIRALAX) packet 17 g  17 g Oral DAILY  bisacodyl (DULCOLAX) tablet 5 mg  5 mg Oral DAILY PRN  
 [Held by provider] sucralfate (CARAFATE) tablet 1 g  1 g Oral AC&HS  influenza vaccine 2019-20 (6 mos+)(PF) (FLUARIX/FLULAVAL/FLUZONE QUAD) injection 0.5 mL  0.5 mL IntraMUSCular PRIOR TO DISCHARGE  hydrALAZINE (APRESOLINE) 20 mg/mL injection 20 mg  20 mg IntraVENous Q6H PRN  
 dextrose 10 % infusion 125-250 mL  125-250 mL IntraVENous PRN No Known Allergies Objective: 
Vitals:   
Vitals:  
 12/06/19 0400 12/06/19 0500 12/06/19 0545 12/06/19 0600 BP:      
Pulse: 83 83  82 Resp: 29 20  26 Temp: 98.2 °F (36.8 °C) SpO2: 95% 98%  98% Weight:   81.6 kg (179 lb 14.3 oz) Height:      
 
Intake and Output: 
No intake/output data recorded. 12/04 1901 - 12/06 0700 In: 1581.2 [I.V.:1581.2] Out: 3548 [[id number]] Physical Examination: 
 
General: In bed, restless legs Neck:  Lines + Resp:  Decreased bibasilar breath sounds; no resp distress CV:  VAD sounds; no LE edema GI:  Soft and non-tender; no distension Neurologic:  Sleeping :  + neal; clear urine [x]    High complexity decision making was performed 
[x]    Patient is at high-risk of decompensation with multiple organ involvement Lab Data Personally Reviewed: I have reviewed all the pertinent labs, microbiology data and radiology studies during assessment. Recent Labs 12/06/19 
0132 12/05/19 
1550 12/05/19 
1036 12/05/19 
0321 12/04/19 
0315  141 140 140 138  
K 3.9 3.7 4.6 3.0* 4.1  103 103 100 102 CO2 31 33* 31 31 29 GLU 98 136* 116* 110* 91 BUN 24* 23* 20 19 20 CREA 2.38* 2.05* 1.92* 1.52* 1.69* CA 9.3 9.2 9.6 9.2 9.1 MG 2.4  --   --  2.3 2.5* ALB 3.2*  --   --  2.9* 2.7* SGOT 14*  --   --  11* 13* ALT 7*  --   --  7* 9* INR 4.0*  --   --  2.8* 2.5* Recent Labs 12/06/19 
3454 12/05/19 
0321 12/04/19 
0315 WBC 5.1 5.0 5.3 HGB 8.3* 9.1* 9.0*  
HCT 27.9* 29.9* 29.6*  203 235 No results found for: SDES Lab Results Component Value Date/Time Culture result: HEAVY ENTEROCOCCUS SPECIES NOTED (A) 11/27/2019 11:10 AM  
 Culture result: LIGHT YEAST (A) 11/27/2019 11:10 AM  
 Culture result: NO GROWTH 5 DAYS 11/12/2019 11:18 AM  
 Culture result: SERRATIA MARCESCENS (A) 10/31/2019 10:05 AM  
 Culture result: SERRATIA MARCESCENS (2ND COLONY TYPE/STRAIN) (A) 10/31/2019 10:05 AM  
 Culture result: ENTEROCOCCUS FAECALIS GROUP D (A) 10/31/2019 10:05 AM  
 
Recent Results (from the past 24 hour(s)) GLUCOSE, POC Collection Time: 12/05/19  7:06 AM  
Result Value Ref Range Glucose (POC) 99 65 - 100 mg/dL Performed by RANJEET Goss   
EKG, 12 LEAD, INITIAL Collection Time: 12/05/19 10:15 AM  
Result Value Ref Range Ventricular Rate 92 BPM  
 Atrial Rate 100 BPM  
 QRS Duration 114 ms Q-T Interval 444 ms QTC Calculation (Bezet) 549 ms Calculated R Axis 108 degrees Calculated T Axis 131 degrees Diagnosis Baseline artifact Electronic ventricular pacemaker When compared with ECG of 04-DEC-2019 09:50, 
Vent. rate has increased BY  17 BPM 
Confirmed by Master Valdes M.D., Andreia Lucio ([id number]) on 12/5/2019 12:11:58 PM 
  
VITAMIN D, 25 HYDROXY Collection Time: 12/05/19 10:36 AM  
Result Value Ref Range Vitamin D 25-Hydroxy 18.9 (L) 30 - 100 ng/mL METABOLIC PANEL, BASIC Collection Time: 12/05/19 10:36 AM  
Result Value Ref Range Sodium 140 136 - 145 mmol/L Potassium 4.6 3.5 - 5.1 mmol/L Chloride 103 97 - 108 mmol/L  
 CO2 31 21 - 32 mmol/L Anion gap 6 5 - 15 mmol/L Glucose 116 (H) 65 - 100 mg/dL BUN 20 6 - 20 MG/DL  Creatinine 1.92 (H) 0.70 - 1.30 MG/DL  
 BUN/Creatinine ratio 10 (L) 12 - 20 GFR est AA 42 (L) >60 ml/min/1.73m2 GFR est non-AA 35 (L) >60 ml/min/1.73m2 Calcium 9.6 8.5 - 10.1 MG/DL  
CK W/ CKMB & INDEX Collection Time: 12/05/19 10:36 AM  
Result Value Ref Range CK 23 (L) 39 - 308 U/L  
 CK - MB 1.8 <3.6 NG/ML  
 CK-MB Index 7.8 (H) 0.0 - 2.5    
TROPONIN I Collection Time: 12/05/19 10:36 AM  
Result Value Ref Range Troponin-I, Qt. 0.19 (H) <0.05 ng/mL POC G3 - PUL Collection Time: 12/05/19 11:02 AM  
Result Value Ref Range pH (POC) 7.511 (H) 7.35 - 7.45    
 pCO2 (POC) 39.7 35.0 - 45.0 MMHG  
 pO2 (POC) 167 (H) 80 - 100 MMHG  
 HCO3 (POC) 31.7 (H) 22 - 26 MMOL/L  
 sO2 (POC) 100 (H) 92 - 97 % Base excess (POC) 9 mmol/L Site RIGHT RADIAL Device: NASAL CANNULA Flow rate (POC) 6 L/M Allens test (POC) YES Specimen type (POC) ARTERIAL    
GLUCOSE, POC Collection Time: 12/05/19 12:35 PM  
Result Value Ref Range Glucose (POC) 117 (H) 65 - 100 mg/dL Performed by [facility] METABOLIC PANEL, BASIC Collection Time: 12/05/19  3:50 PM  
Result Value Ref Range Sodium 141 136 - 145 mmol/L Potassium 3.7 3.5 - 5.1 mmol/L Chloride 103 97 - 108 mmol/L  
 CO2 33 (H) 21 - 32 mmol/L Anion gap 5 5 - 15 mmol/L Glucose 136 (H) 65 - 100 mg/dL BUN 23 (H) 6 - 20 MG/DL Creatinine 2.05 (H) 0.70 - 1.30 MG/DL  
 BUN/Creatinine ratio 11 (L) 12 - 20 GFR est AA 39 (L) >60 ml/min/1.73m2 GFR est non-AA 32 (L) >60 ml/min/1.73m2 Calcium 9.2 8.5 - 10.1 MG/DL  
GLUCOSE, POC Collection Time: 12/05/19  5:11 PM  
Result Value Ref Range Glucose (POC) 104 (H) 65 - 100 mg/dL Performed by Natasha Hartman GLUCOSE, POC Collection Time: 12/05/19 10:17 PM  
Result Value Ref Range Glucose (POC) 92 65 - 100 mg/dL Performed by Cynthia Plascencia METABOLIC PANEL, COMPREHENSIVE Collection Time: 12/06/19  3:39 AM  
Result Value Ref Range  Sodium 143 136 - 145 mmol/L  
 Potassium 3.9 3.5 - 5.1 mmol/L Chloride 107 97 - 108 mmol/L  
 CO2 31 21 - 32 mmol/L Anion gap 5 5 - 15 mmol/L Glucose 98 65 - 100 mg/dL BUN 24 (H) 6 - 20 MG/DL Creatinine 2.38 (H) 0.70 - 1.30 MG/DL  
 BUN/Creatinine ratio 10 (L) 12 - 20 GFR est AA 33 (L) >60 ml/min/1.73m2 GFR est non-AA 27 (L) >60 ml/min/1.73m2 Calcium 9.3 8.5 - 10.1 MG/DL Bilirubin, total 1.1 (H) 0.2 - 1.0 MG/DL  
 ALT (SGPT) 7 (L) 12 - 78 U/L  
 AST (SGOT) 14 (L) 15 - 37 U/L Alk. phosphatase 98 45 - 117 U/L Protein, total 6.8 6.4 - 8.2 g/dL Albumin 3.2 (L) 3.5 - 5.0 g/dL Globulin 3.6 2.0 - 4.0 g/dL A-G Ratio 0.9 (L) 1.1 - 2.2 MAGNESIUM Collection Time: 12/06/19  3:39 AM  
Result Value Ref Range Magnesium 2.4 1.6 - 2.4 mg/dL CBC W/O DIFF Collection Time: 12/06/19  3:39 AM  
Result Value Ref Range WBC 5.1 4.1 - 11.1 K/uL  
 RBC 2.79 (L) 4.10 - 5.70 M/uL HGB 8.3 (L) 12.1 - 17.0 g/dL HCT 27.9 (L) 36.6 - 50.3 % .0 (H) 80.0 - 99.0 FL  
 MCH 29.7 26.0 - 34.0 PG  
 MCHC 29.7 (L) 30.0 - 36.5 g/dL  
 RDW 18.1 (H) 11.5 - 14.5 % PLATELET 147 055 - 334 K/uL MPV 9.3 8.9 - 12.9 FL  
 NRBC 0.0 0  WBC ABSOLUTE NRBC 0.00 0.00 - 0.01 K/uL PROTHROMBIN TIME + INR Collection Time: 12/06/19  3:39 AM  
Result Value Ref Range INR 4.0 (H) 0.9 - 1.1 Prothrombin time 37.4 (H) 9.0 - 11.1 sec PTT Collection Time: 12/06/19  3:39 AM  
Result Value Ref Range aPTT 47.0 (H) 22.1 - 32.0 sec  
 aPTT, therapeutic range     58.0 - 77.0 SECS  
DIGOXIN Collection Time: 12/06/19  3:39 AM  
Result Value Ref Range Digoxin level 1.7 0.90 - 2.00 NG/ML  
LACTIC ACID Collection Time: 12/06/19  3:39 AM  
Result Value Ref Range Lactic acid 1.1 0.4 - 2.0 MMOL/L  
LD Collection Time: 12/06/19  3:39 AM  
Result Value Ref Range  85 - 241 U/L  
NT-PRO BNP Collection Time: 12/06/19  3:39 AM  
Result Value Ref Range  NT pro-BNP 10,415 (H) <125 PG/ML  
 
 
 
 
 Bentley Butts MD

## 2019-12-06 NOTE — PROGRESS NOTES
Problem: Self Care Deficits Care Plan (Adult) Goal: *Acute Goals and Plan of Care (Insert Text) Description FUNCTIONAL STATUS PRIOR TO ADMISSION: Patient was modified independent using a single point cane for functional mobility. Patient able to shower and dress himself. However, patient required assistance for household management from his wife. HOME SUPPORT: The patient lived alone with wife to provide assistance. Occupational Therapy Goals: OT weekly reassessment 12/6/19: goals modified 1. Patient will perform upper body dressing moderate assistance within 7 day(s). 2.  Patient will perform lower body dressing with moderate assistance within 7 day(s). 3.  Patient will perform grooming seated EOB with minimum assistance within 7 day(s). 4.  Patient will perform toilet transfers with minimum assistance within 7 day(s). 5.  Patient will perform all aspects of toileting with moderate assistance within 7 day(s). 6.  Patient will participate in upper extremity therapeutic exercise/activities with supervision/set-up-min A for 5 minutes within 7 day(s). 7.  Patient will utilize energy conservation techniques during functional activities with verbal cues within 7 day(s). 8. Patient will verbalize 3/5 LVAD components with min verbal cues within 7 day (s). OT weekly reassessment 11/29/19: goals modified below 1. Patient will perform upper body dressing including LVAD switchovers with moderate assistance within 7 day(s). 2.  Patient will perform lower body dressing with minimal assistance within 7 day(s). 3.  Patient will perform grooming in standing with minimum assistance within 7 day(s). 4.  Patient will perform toilet transfers with minimum assistance within 7 day(s). 5.  Patient will perform all aspects of toileting with minimal assistance within 7 day(s).  
6.  Patient will participate in upper extremity therapeutic exercise/activities with supervision/set-up for 5 minutes within 7 day(s). 7.  Patient will utilize energy conservation techniques during functional activities with verbal cues within 7 day(s). 8. Patient will verbalize LVAD terminology with verbal cues within 7 day (s). Goals reviewed and continued/modified as follows 11/20/19 s/p LVAD implantation 1. Patient will perform upper body dressing including LVAD switchovers with moderate assistance within 7 day(s). 2.  Patient will perform lower body dressing with minimal assistance within 7 day(s). 3.  Patient will perform grooming with supervision/setup within 7 day(s). 4.  Patient will perform toilet transfers supervision/setupmodified independence within 7 day(s). 5.  Patient will perform all aspects of toileting with minimal assistance within 7 day(s). 6.  Patient will participate in upper extremity therapeutic exercise/activities with supervision/set-up for 5 minutes within 7 day(s). 7.  Patient will utilize energy conservation techniques during functional activities with verbal cues within 7 day(s). 8. Patient will verbalize LVAD terminology with verbal cues within 7 day (s). Goals reviewed and continued/modified as follows 11/12/19 1. Patient will perform bathing with supervision/set-up within 7 day(s). 2.  Patient will perform lower body dressing with supervision/set-up within 7 day(s). 3.  Patient will perform grooming with modified independence within 7 day(s). 4.  Patient will perform toilet transfers with modified independence within 7 day(s). 5.  Patient will perform all aspects of toileting with supervision/set-up within 7 day(s). 6.  Patient will participate in upper extremity therapeutic exercise/activities with supervision/set-up for 5 minutes within 7 day(s). 7.  Patient will utilize energy conservation techniques during functional activities with verbal cues within 7 day(s). 8. Patient will verbalize LVAD terminology with verbal cues within 7 day (s) in preparation for implant. Continue all goals 10/30/19 post re-eval for Impella removal  
Initiated 10/28/2019 1. Patient will perform bathing with supervision/set-up within 7 day(s). 2.  Patient will perform lower body dressing with supervision/set-up within 7 day(s). 3.  Patient will perform grooming with modified independence within 7 day(s). 4.  Patient will perform toilet transfers with modified independence within 7 day(s). 5.  Patient will perform all aspects of toileting with supervision/set-up within 7 day(s). 6.  Patient will participate in upper extremity therapeutic exercise/activities with supervision/set-up for 5 minutes within 7 day(s). 7.  Patient will utilize energy conservation techniques during functional activities with verbal cues within 7 day(s). Outcome: Progressing Towards Goal 
  
OCCUPATIONAL THERAPY TREATMENT/WEEKLY RE-ASSESSMENT Patient: Cornel Silverio (75 y.o. male) Date: 12/6/2019 Diagnosis: Acute decompensated heart failure ([facility] [address] [I50.9] <principal problem not specified> Procedure(s) (LRB): LEFT BRACHIAL THROMBECTOMY (Left) 11 Days Post-Op Precautions: Fall, Sternal 
Chart, occupational therapy assessment, plan of care, and goals were reviewed. ASSESSMENT Patient continues with skilled OT services and is slowly progressing towards goals. Pt more alert this date with less myoclonic tremors. However, he continues to demonstrate poor carryover of sternal precautions and no recall for LVAD components with max cueing ( and drive line). He improved his sitting tolerance this date while EOB from 5 minutes yesterday to 15 minutes today. It was during this time that pt c/o vertical diplopia. Pt demonstrated what appeared to be a 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 with R eye not adducting past midline.  Diplopia resolved with monocular vision and will issue pt eye patch per his request. Nystagmus noted in L eye with attempts to track to pt's L lateral visual quadrant. Informed RN of above. His UE/LE strength was equal bilaterally but noted impaired coordination during rapid alternating supination/pronation. Family present and reported hx of cerebellar ataxia of pt's father and two sisters. Assisted pt back to supine with mod A x 2. Pt able to bridge and clear B hips x 2 for adjustment of linens. Current Level of Function Impacting Discharge (ADLs): mod to max A for ADLs, newly onset of diplopia, myoclonic tremors Other factors to consider for discharge: LVAD PLAN : 
Goals have been updated based on progression since last assessment. Patient continues to benefit from skilled intervention to address the above impairments. Continue to follow patient 5 times a week to address goals. Recommend with staff: increase OOB activity as able Recommend next OT session: transfers in prep for ADLs, EOB ADLs Recommendation for discharge: (in order for the patient to meet his/her long term goals) Therapy 3 hours per day 5-7 days per week This discharge recommendation: 
Has been made in collaboration with the attending provider and/or case management IF patient discharges home will need the following DME: to be determined (TBD) SUBJECTIVE:  
Patient stated I see two of you.  OBJECTIVE DATA SUMMARY:  
Cognitive/Behavioral Status: 
Neurologic State: Alert;Confused Orientation Level: Oriented X4 Cognition: Decreased attention/concentration;Decreased command following; Impaired decision making Functional Mobility and Transfers for ADLs: 
Bed Mobility: 
Supine to Sit: Moderate assistance;Assist x2 Sit to Supine: Moderate assistance;Assist x2 Scooting: Minimum assistance;Assist x1 Transfers: 
  
  
  
 
Balance: 
Sitting: Impaired Sitting - Static: Fair (occasional) Sitting - Dynamic: Fair (occasional) ADL Intervention: 
  
 
  
 
  
 
  
 
  
 
  
 
  
 
  
 
Therapeutic Exercises: With noted visual changes with pt, completed neuro assessment involving B  strength, proprioception with arms at 90 with eyes open/closed, no pronator drift. No facial asymmetry. Pt without dentures in. Decreased coordination during rapid alternating movements. Activity Tolerance:  
Fair Please refer to the flowsheet for vital signs taken during this treatment. After treatment patient left in no apparent distress:  
Supine in bed, Heels elevated for pressure relief, Call bell within reach, and Caregiver / family present COMMUNICATION/COLLABORATION:  
The patients plan of care was discussed with: Physical Therapist, Registered Nurse, and Certified Nursing Assistant/Patient Care Technician Ruthanne Hodgkin, OT Time Calculation: 30 mins

## 2019-12-07 NOTE — PROGRESS NOTES
[address] [facility] [facility] in [place], [place] Inpatient Progress Note Patient name: Corby David Patient : 1950 Patient MRN: [id number] Attending MD: Devin Eller MD 
Date of service: 19 Chief Complaint:  
Juan Luis Folds azucena LVAD implant 
  
HPI: Sona Kiser is a 71y.o. year old pleasant white male with a history of HTN, HLD, JOSE, CAD s/p cardiac arrest VFib s/p CABG () c/b sternal would infection and sternectomy, ischemic cardiomyopathy LVEF 15-20%, s/p ICD and with LBBB. He was admitted with acute on chronic systolic heart failure with massive volume overload > 20 lbs, in the setting of atrial fibrillation s/p failed DCCV and underwent DCCV and RHC on .  S/p BiVICD on 19 with Dr. Pennie Noriega. He was discharged home home on IV milrinone on 19. Mathew Boast has been followed closely by Dr. Jhonatan Meyers and the [facility]. 
  
Mr. Kiser was admitted for acute on chronic systolic heart failure. He underwent implantation of Impella 5.0 due to CS and has completed his LVAD evaluation. Mathew Boast meets criteria for implant of HM3 as DT. He was NYHA Class IV prior to implant of Impella 5.0, has LVEF < 15%, was intolerant of GDMT due to symptomatic hypotension and renal dysfunction. He remains dependent on temporary MCS support. RHC  revealed compensated hemodynamics on Impella. His renal function has improved dramatically with improvement in his hemodynamics. He is not a suitable transplant candidate due to single kidney, sternectomy, debility, and frailty. He was reviewed by Aneta Nicole and felt to be a high risk heart transplant candidate due to multiple co-morbidities as well as the afore mentioned conditions.  He remained in the CCU and underwent a HM 3 implant as DT on . He was weaned off of pressors and transferred to [place] [address] where he continues to undergo PT/OT and hemodynamic optimization.   
  
24Hr Events Renal function worse today Head CT pending INR up to 5   Procedure:  Procedure(s): REMOVAL TEMPORARY LEFT VENTRICULAR ASSIST DEVICE, IMPLANTATION OF LEFT VENTRICULAR ASSIST DEVICE PERMANENT (VAD), ECC, JACQUE, EPI AORTIC US BY DR Albertina Haas.    
MYR:  46 
  
Impression / Plan:  
Heart Failure Status: NYHA Class IV INTERMACS Category 2 
  
Chronic systolic heart failure due to ICM, NYHA Class IV, EF < 15%, cardiogenic shock bridged with Impella 5.0 support to HM 3 implant S/p Impella removal and LVAD implant Continue RPMs at 5600 - LVIDd down to 5.3 cm  
TTE with bubble study negative for PFO Continue milrinone 0.2 mcg/kg/min Hold diuretics Volume today Unable to obtain CardioMEMS reading d/t interference Cont Sildenafil 20 mg PO TID - rx sent to local pharmacy to initiate PA No AA/ARB/ARNI post op- will start as appropriate Strict I/O, daily weights, Na+ restricted diet Continue LVAD education Daily dressing changes Generalized myoclonus Appreciate Neurology input Cont Keppra Head CT negative for acute process- chronic changes only EEG? Monitor closely Remain in CVICU 
  
Anticoagulation for LVAD, INR goal 2-3 Continue ASA INR supratherapeutic Vit K IV today Hold coumadin  
  
Acute Respiratory Failure post op Improved with bumex gtt, milrinone gtt CXR and O2 requirement improved TTE with Bubble study negative for PFO Pulmonary Consult appreciated Pulmonary hygiene Cont home CPAP- must use while sleeping  
  
Post op Pain PRN Tylenol Comfort measures  
  
L Brachial Artery Thrombosis s/p thrombectomy Surgical management per Dr. Sneha Méndez Pain improved  
  
Swelling, pain of right arm, acute Doppler studies (-) for thrombus  
Continue AC Confirmed PICC placement 
  
Acute on CKD 3, atrophic left kidney Appreciate Nephrology assistance IV milrinone Off diuretics Albumin and fluids today Avoid nephrotoxins Trend labs ProNTBNP remains elevated 
  
CAD s/p CABG Cont low dose ASA- watch platelets No BB d/t RV failure Hold statin due to recent hepatic failure LHC performed 11/13 - low likelihood of benefit from revascularization  
  
Hx of VF arrest s/p BiV-ICD No recent shocks Keep K > 4 and Mg > 2  
   
PAF Currently in ST, Paced Hold digoxin - level 1.4 today - do not resume until < 1 Repeat TFTs WNL 
  
Hypokalemia IV repletion until taking PO 
  
Hx of gout Uric acid WNL 
  
Suspected aspiration pneumonia RUL consolidation Suspect aspiration related to over sedation Limit sedatives Sputum culture Zosyn- may need to renally dose  
  
Urinary retention, c/b serratia UTI and hematuria Appreciate Urology consult Cystoscopy performed 11/13 shows catheter induced cystitis Repeat UA shows resolution of UTI  
  
Malnutrition Appreciate Nutritionist consult Repeat prealbumin 6 small meals daily once taking PO Cont TF via dobhoff Hold mirtazapine d/t tremors, confusion  
  
COPD Appreciate Pulmonology assistance Continue nebs PRN   
PFTs complete 
  
Anemia Multifactorial, due to hemolysis + epistaxis + hematuria Intolerant of octreotide or doxycycline for AVM ppx due to prolonged QTc Transfuse for Hgb goal > 8 
  
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  
  
Hx of sternal wound infection, sternectomy Sternum closed post op- monitor  
  
Vocal cord paralysis Improved ENT recs appreciated Neck CT today Speech therapy following - appreciate input 
  
Debility Continue PT/OT  
  
Ppx Protonix PT/OT Post-op abx complete Bowel regimen Cont Mechanical soft, no thin liquids PICC placed 
  
Dispo: 
Likely will need IP rehab. Remain in CVICU for now. LVAD INTERROGATION: 
Device interrogated in person Device function normal, normal flow, no events LVAD Pump Speed (RPM): 0493 Pump Flow (LPM): 4.9 MAP: 78 
PI (Pulsitility Index): 3.2 Power: 4.1  Test: No 
Back Up  at Bedside & Labeled: Yes Power Module Test: No 
 Driveline Site Care: No 
Driveline Dressing: Clean, Dry, and Intact Outpatient: No 
MAP in Therapeutic Range (Outpatient): Yes Testing Alarms Reviewed: Yes 
Back up SC speed: 5600 Back up Low Speed Limit: 5200 Emergency Equipment with Patient?: Yes Emergency procedures reviewed?: Yes Drive line site inspected?: No 
Drive line intergrity inspected?: Yes Drive line dressing changed?: No 
 
PHYSICAL EXAM: 
Visit Vitals BP (!) 74/56 Pulse 71 Temp 97.2 °F (36.2 °C) Resp 16 Ht 6' 2\" (1.88 m) Wt 179 lb 0.2 oz (81.2 kg) SpO2 100% BMI 22.98 kg/m² Physical Exam  
Constitutional: He appears well-developed and well-nourished. He appears lethargic. No distress. More fatigued today HENT:  
Head: Normocephalic. Neck: Normal range of motion. Neck supple. No JVD present. Cardiovascular: Regular rhythm. + VAD hum Pulmonary/Chest: Effort normal and breath sounds normal. No respiratory distress. Abdominal: Soft. Bowel sounds are normal. He exhibits no distension. Dobhoff in place Musculoskeletal: Normal range of motion. General: No edema. Neurological: He appears lethargic. Intermittent confusion Skin: Skin is warm and dry. Nursing note and vitals reviewed. REVIEW OF SYSTEMS: 
Review of Systems Constitutional: Positive for fever and malaise/fatigue. Negative for chills. HENT: Positive for hearing loss. Respiratory: Negative for cough and shortness of breath. Cardiovascular: Negative for chest pain, palpitations, orthopnea and leg swelling. Gastrointestinal: Negative for heartburn, nausea and vomiting. Genitourinary: Negative. Musculoskeletal: Negative. Neurological: Positive for weakness. Negative for dizziness and headaches. Endo/Heme/Allergies: Negative. PAST MEDICAL HISTORY: 
Past Medical History:  
Diagnosis Date  Degenerative disc disease, lumbar  Heart failure ([address]  High cholesterol  Hypertension  Paroxysmal atrial fibrillation [address] [address] 2019  Spinal stenosis PAST SURGICAL HISTORY: 
Past Surgical History:  
Procedure Laterality Date  COLONOSCOPY Left 2019 COLONOSCOPY at bedside performed by Jamia Vieira MD at [address]  HX CORONARY ARTERY BYPASS GRAFT    
 triple  HX HERNIA REPAIR    
 HX IMPLANTABLE CARDIOVERTER DEFIBRILLATOR  CO CARDIOVERSION ELECTIVE ARRHYTHMIA EXTERNAL N/A 6/10/2019 EP CARDIOVERSION performed by Aura Stone MD at [address] CATH LAB  CO CARDIOVERSION ELECTIVE ARRHYTHMIA EXTERNAL N/A 2019 EP CARDIOVERSION performed by Kristine Bowers MD at [address] CATH LAB  CO INSJ/RPLCMT PERM DFB W/TRNSVNS LDS 1/DUAL CHMBR N/A 2019 INSERT ICD BIV MULTI performed by Malia Florian MD at [address] CATH LAB  CO TCAT IMPL WRLS P-ART PRS SNR L-T HEMODYN MNTR N/A 2019 IMPLANT HEART FAILURE MONITORING DEVICE performed by Sara Solomon MD at [address] CATH LAB FAMILY HISTORY: 
Family History Problem Relation Age of Onset  Lung Disease Mother  Hypertension Mother Velta Ganser Arthritis-osteo Mother  Heart Disease Mother  Heart Disease Father  Diabetes Father SOCIAL HISTORY: 
Social History Socioeconomic History  Marital status:  Spouse name: Not on file  Number of children: Not on file  Years of education: Not on file  Highest education level: Not on file Tobacco Use  Smoking status: Former Smoker Last attempt to quit: 2010 Years since quittin.0  Smokeless tobacco: Never Used Substance and Sexual Activity  Alcohol use: Not Currently Comment: rarely  Drug use: Never Other Topics Concern LABORATORY RESULTS: 
  
Labs Latest Ref Rng & Units 2019 2019 2019 2019 2019 2019 12/3/2019 WBC 4.1 - 11.1 K/uL 5.1 5.1 - - 5.0 5.3 5.7 RBC 4.10 - 5.70 M/uL 2.85(L) 2.79(L) - - 3.07(L) 3.00(L) 2.84(L) Hemoglobin 12.1 - 17.0 g/dL 8.4(L) 8. 3(L) - - 9. 1(L) 9.0(L) 8.4(L) Hematocrit 36.6 - 50.3 % 28. 3(L) 27. 9(L) - - 29. 9(L) 29. 6(L) 28. 3(L) MCV 80.0 - 99.0 FL 99. 3(H) 100. 0(H) - - 97.4 98.7 99. 6(H) Platelets 125 - 750 K/uL 174 201 - - 203 235 239 Lymphocytes 12 - 49 % - - - - - - - Monocytes 5 - 13 % - - - - - - - Eosinophils 0 - 7 % - - - - - - - Basophils 0 - 1 % - - - - - - - Albumin 3.5 - 5.0 g/dL 3. 1(L) 3. 2(L) - - 2. 9(L) 2. 7(L) 2. 4(L) Calcium 8.5 - 10.1 MG/DL 9.4 9.3 9.2 9.6 9.2 9.1 9.1 SGOT 15 - 37 U/L 12(L) 14(L) - - 11(L) 13(L) 17 Glucose 65 - 100 mg/dL 145(H) 98 136(H) 116(H) 110(H) 91 90 BUN 6 - 20 MG/DL 29(H) 24(H) 23(H) 20 19 20 21(H) Creatinine 0.70 - 1.30 MG/DL 3.01(H) 2.38(H) 2.05(H) 1.92(H) 1.52(H) 1.69(H) 1.78(H) Sodium 136 - 145 mmol/L 147(H) 143 141 140 140 138 135(L) Potassium 3.5 - 5.1 mmol/L 3.2(L) 3.9 3.7 4.6 3. 0(L) 4.1 4.9 TSH 0.36 - 3.74 uIU/mL - - - - - - 2.30 PSA 0.01 - 4.0 ng/mL - - - - - - -  
LDH 85 - 241 U/L 250(H) 234 - - 249(H) 286(H) 348(H) CEA ng/mL - - - - - - - Some recent data might be hidden Lab Results Component Value Date/Time TSH 2.30 12/03/2019 03:29 AM  
 TSH 2.40 10/25/2019 07:39 PM  
 TSH 2.45 06/01/2019 04:16 AM  
 
 
ALLERGY: 
No Known Allergies CURRENT MEDICATIONS: 
 
Current Facility-Administered Medications:  
  0.45% sodium chloride with KCl 20 mEq/L infusion, , IntraVENous, CONTINUOUS, Logan Kincaid MD, Last Rate: 50 mL/hr at 12/07/19 1051   potassium chloride 20 mEq in 50 ml IVPB, 20 mEq, IntraVENous, Q1H, Logan Kincaid MD, Last Rate: 25 mL/hr at 12/07/19 1045, 20 mEq at 12/07/19 1045   piperacillin-tazobactam (ZOSYN) 3.375 g in 0.9% sodium chloride (MBP/ADV) 100 mL, 3.375 g, IntraVENous, Q12H, Juan C Olivares MD 
  aspirin chewable tablet 81 mg, 81 mg, Oral, DAILY, Rusty Andrews MD, 81 mg at 12/07/19 0901   LORazepam (ATIVAN) injection 1 mg, 1 mg, IntraVENous, ONCE PRN, Shana Sims, NP 
  phytonadione (vitamin K1) (AQUA-MEPHYTON) 10 mg in 0.9% sodium chloride 50 mL IVPB, 10 mg, IntraVENous, ONCE, Shana Sims, NP 
  levETIRAcetam (KEPPRA) 250 mg in 0.9% sodium chloride 100 mL IVPB, 250 mg, IntraVENous, Q12H, Javed Beck MD, 250 mg at 12/07/19 0603 
  0.9% sodium chloride infusion, 6 mL/hr, IntraVENous, CONTINUOUS, Fabricio Herrera NP, Last Rate: 6 mL/hr at 12/07/19 0745, 6 mL/hr at 12/07/19 0745   albuterol-ipratropium (DUO-NEB) 2.5 MG-0.5 MG/3 ML, 3 mL, Nebulization, Q4H RT, Ana Maria Andrews MD, 3 mL at 12/07/19 0792   allopurinol (ZYLOPRIM) tablet 100 mg, 100 mg, Oral, DAILY, Fabricio Herrera NP, 100 mg at 12/07/19 0831 
  sildenafil (REVATIO) 2 mg/mL oral suspension 20 mg, 20 mg, Oral, Q8H, Fabricio Herrera NP, 20 mg at 12/07/19 0901   pantoprazole (PROTONIX) 40 mg in 0.9% sodium chloride 10 mL injection, 40 mg, IntraVENous, DAILY, Fabricio Herrera NP, 40 mg at 12/07/19 0826 
  arformoterol (BROVANA) neb solution 15 mcg, 15 mcg, Nebulization, BID RT, Stopped at 12/06/19 2100 **AND** budesonide (PULMICORT) 500 mcg/2 ml nebulizer suspension, 500 mcg, Nebulization, BID RT, Rin Herrera NP, 500 mcg at 12/06/19 1927 
  milrinone (PRIMACOR) 20 MG/100 ML D5W infusion, 0.2 mcg/kg/min, IntraVENous, CONTINUOUS, Ana Maria Andrews MD, Last Rate: 5.2 mL/hr at 12/07/19 0745, 0.2 mcg/kg/min at 12/07/19 0745 
  magnesium oxide (MAG-OX) tablet 400 mg, 400 mg, Oral, DAILY, Logan Kincaid MD, 400 mg at 12/07/19 0831 
  artificial saliva (MOUTH KOTE) 1 Spray, 1 Spray, Oral, PRN, Sharon, Ciro Noel NP 
  lactobac ac& pc-s.therm-b.anim (TIAN Q/RISAQUAD), 1 Cap, Oral, DAILY, Carmelo Velásquez MD, 1 Cap at 12/07/19 0831 
  oxyCODONE IR (ROXICODONE) tablet 5 mg, 5 mg, Oral, Q6H PRN, Freddie Javier MD, 5 mg at 12/06/19 5950   balsam peru-castor oil (VENELEX) ointment, , Topical, TID, Mack Andrews MD 
  [Held by provider] tamsulosin (FLOMAX) capsule 0.4 mg, 0.4 mg, Oral, DAILY, Logan Kincaid MD, Stopped at 12/04/19 0900 
  insulin lispro (HUMALOG) injection, , SubCUTAneous, AC&HS, Shana Sims, NP, 2 Units at 12/07/19 4981   Warfarin MD/NP dosing, , Other, PRN, Mounika Altman MD 
  epoetin yvonne-epbx (RETACRIT) injection 20,000 Units, 20,000 Units, SubCUTAneous, Q7D, Laina Souza MD, 20,000 Units at 12/03/19 9802   [Held by provider] lidocaine (LIDODERM) 5 % patch 1 Patch, 1 Patch, TransDERmal, Q24H, Shana Sims NP, 1 Patch at 12/03/19 2158   sodium chloride (NS) flush 5-40 mL, 5-40 mL, IntraVENous, Q8H, Nick Sousa MD, 10 mL at 12/07/19 9960   sodium chloride (NS) flush 5-40 mL, 5-40 mL, IntraVENous, PRN, Nick Sousa MD 
  acetaminophen (TYLENOL) tablet 650 mg, 650 mg, Oral, Q6H PRN, Nick Sousa MD, 650 mg at 12/07/19 0609 
  naloxone [facility]) injection 0.4 mg, 0.4 mg, IntraVENous, PRN, Nick Sousa MD 
  ondansetron [facility]) injection 4 mg, 4 mg, IntraVENous, Q4H PRN, Nick Sousa MD, 4 mg at 11/20/19 2000   [Held by provider] senna-docusate (PERICOLACE) 8.6-50 mg per tablet 1 Tab, 1 Tab, Oral, DAILY, Nick Sousa MD, Stopped at 12/04/19 0900   [Held by provider] polyethylene glycol (MIRALAX) packet 17 g, 17 g, Oral, DAILY, Nick Sousa MD, Stopped at 12/04/19 0900 
  bisacodyl (DULCOLAX) tablet 5 mg, 5 mg, Oral, DAILY PRN, Nick Sousa MD 
  [Held by provider] sucralfate (CARAFATE) tablet 1 g, 1 g, Oral, AC&HS, Nick Sousa MD, Stopped at 12/04/19 0730 
  influenza vaccine 2019-20 (6 mos+)(PF) (FLUARIX/FLULAVAL/FLUZONE QUAD) injection 0.5 mL, 0.5 mL, IntraMUSCular, PRIOR TO DISCHARGE, Mounika Altman MD 
  hydrALAZINE (APRESOLINE) 20 mg/mL injection 20 mg, 20 mg, IntraVENous, Q6H PRN, Shana Sims NP, 20 mg at 11/19/19 0547 
  dextrose 10 % infusion 125-250 mL, 125-250 mL, IntraVENous, PRN, Ann Marie Santos MD, Stopped at 11/18/19 0700 Thank you for allowing me to participate in this patient's care. TIERRA Cisneros [address] [place] [address], Suite [facility] Phone: [phone] Fax: [phone]

## 2019-12-07 NOTE — CONSULTS
Re evaluated patient at bedside with scope Patient on flexible laryngoscopy shows normal mucosa NP/HP/Larynx but left TVC immobility. Speech therapist at bedside/swallow therapist - discussed patient at length with speech therapist.  
Found history - VC paralysis known since last evaluation by Dr Dasha Briggs - patient was waiting to obtain modified barium swallow. Consulted ENT again when found to be aspirating. Found to aspirate thin liquids. Swallow therapy/speech therapy working with patient at this juncture. ? To address is whether to medialize the vocal cord - temporary injection -  
Recommend given patient's current status that his diet be modified to no thin liquids - continue work with speech/swallow therapy - hold on injection right now. Reconsult if not improved in 3 months time with therapy. Would then reconsider but balance health status and invasive procedure. Might be most wise to modify diet and work with speech swallow therapy. Also of note: other CN issues at hand now - is the Counts include [facility] beds at the [facility] palsy/paralysis from a procedure or is it other etiology - has had multiple images of chest - needs CT of neck as well as imaging of the brain (ideally MRI but aware that cannot have MRIs) to rule out lesion along course of the cranial nerve. Recommend CT neck - aware that imaging of head is being obtained already.   
Reconsult if needed -  
Eric Price MD

## 2019-12-07 NOTE — PROGRESS NOTES
Problem: Falls - Risk of 
Goal: *Absence of Falls Description Document [place] Fall Risk and appropriate interventions in the flowsheet. Outcome: Progressing Towards Goal 
Note: Fall Risk Interventions: 
Mobility Interventions: Communicate number of staff needed for ambulation/transfer Mentation Interventions: Adequate sleep, hydration, pain control Medication Interventions: Evaluate medications/consider consulting pharmacy Elimination Interventions: Toileting schedule/hourly rounds History of Falls Interventions: Consult care management for discharge planning Problem: Pain Goal: *Control of Pain Outcome: Progressing Towards Goal 
  
Problem: Pressure Injury - Risk of 
Goal: *Prevention of pressure injury Description Document [place] Scale and appropriate interventions in the flowsheet. Outcome: Progressing Towards Goal 
Note: Pressure Injury Interventions: 
Sensory Interventions: Assess changes in LOC, Assess need for specialty bed, Chair cushion Moisture Interventions: Apply protective barrier, creams and emollients, Assess need for specialty bed, Check for incontinence Q2 hours and as needed Activity Interventions: Assess need for specialty bed, Chair cushion, Increase time out of bed Mobility Interventions: Chair cushion, Float heels, HOB 30 degrees or less Nutrition Interventions: Document food/fluid/supplement intake, Discuss nutritional consult with provider Friction and Shear Interventions: Apply protective barrier, creams and emollients, Feet elevated on foot rest, Foam dressings/transparent film/skin sealants Problem: Infection - Risk of, Multi-drug Resistant Organism Colonization (MDRO) Goal: *Absence of MDRO colonization Outcome: Progressing Towards Goal 
Goal: *Absence of infection signs and symptoms Outcome: Progressing Towards Goal 
  
Problem: Diabetes Self-Management Goal: *Disease process and treatment process Description Define diabetes and identify own type of diabetes; list 3 options for treating diabetes. Outcome: Progressing Towards Goal 
Goal: *Incorporating nutritional management into lifestyle Description Describe effect of type, amount and timing of food on blood glucose; list 3 methods for planning meals. Outcome: Progressing Towards Goal 
  
Problem: Nutrition Deficit Goal: *Optimize nutritional status Outcome: Progressing Towards Goal 
  
Problem: Impaired Skin Integrity/Pressure Injury Treatment Goal: *Improvement of Existing Pressure Injury Outcome: Progressing Towards Goal 
  
Problem: Impaired Skin Integrity/Pressure Injury Treatment Goal: *Improvement of Existing Pressure Injury Outcome: Progressing Towards Goal 
  
Problem: Nutrition Deficit Goal: *Optimize nutritional status Outcome: Progressing Towards Goal

## 2019-12-07 NOTE — PROGRESS NOTES
0730: Bedside and Verbal shift change report given to KATRIN BURROUGHS (oncoming nurse) by Silvio Chin RN (offgoing nurse). Report included the following information SBAR, Kardex, Intake/Output, MAR, Recent Results, Med Rec Status and Cardiac Rhythm Paced. 0800: Thomas Christine MD at bedside. Orders to give 250 cc Albumin, check urine osmolality and give 2 runs of 20 mEq KCl. Zosyn switched to Q12H d/t increasing creatinine. 0930: Spoke with Myles Chandra NP regarding CT this AM. Also mentioned INR of 5. Orders to give 1 mg PRN Ativan this AM during CT. No other orders received at this time. 1000: Transported patient down to CT of head/neck with 2 PCTs on monitor with emergency med transport box if needed. Pt's VSS. MAP currently 78. 
1030: Patient back on CVICU. VSS. Pt calm at this time. 1100: Pt at tube feed goal of 55 mL/hr with 125 q4h water flushes per Thomas Christine MD.  
1150: Vitamin K given for INR of 5. 
1200: Pt ate 100% of thickened liquid lunch with this RN and PCTs assistance. No coughing or difficulty noted. 1930: Bedside and Verbal shift change report given to Silvio Chin RN (oncoming nurse) by KATRIN BURROUGHS (offgoing nurse). Report included the following information SBAR, Kardex, Intake/Output, MAR, Recent Results, Med Rec Status and Cardiac Rhythm Paced.

## 2019-12-07 NOTE — PROGRESS NOTES
[facility] 
 [address] [facility], [address] [facility] Phone: [phone]   [phone]   
  
Nephrology Progress Note Sona Kiser     1950     [id number] Date of Admission : 10/25/2019 
12/07/19 CC:  Follow up for JUAN J, CKD, Hyponatremia Assessment and Plan JUAN J on CKD - Now IV dry and Oliguric  ==> doubling of NT -BNP  
- 250 cc IV albumin now + 1/2 NS w/ Kcl at 50 c/ hr  
- check urine lytes - check Urine Eosinophils and may need to lower zosyn to 3.375 mg Q12 hr until GFR improves  
- labs again tomorrow  
- avoid all IV contrast studies Coagulopathy : 
- No active bleeding: watch H/H Hypokalemia  
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  
- unable to get CTA/MRA 
- per neuro - On Keppra 
  
Gross hematuria, BPH w/ retention: 
- off CBI pre VAD. cysto pre op showed catheter related Cystitis @ postr wall  
- neal had to be replaced due to urinary retention 
- flomax - on hold  
- keep neal for now until he is stable from CHF stand point Acute on Chronic HFrEF  
- EF 16-20%, NYHA Class IV , hx of VF arrest - s/p AICD 
- Impella insertion 10/29- removed 11/18  
- s/p LVAD placement on 11/18 Hoarseness, vocal cord paralysis, mediastinal adenopathy: 
- will need eventual PET/CT 
  
L arm clot s/p thrombectomy on 11/25 JOSE on CPAP  
  
PAF s/p DCCV 6/19 
  
Anemia: 
- from blood loss s/p multiple blood products - s/p IV iron,  Repeat iron studies ok 
- on PAVEL q7 d Serratia and Enteroccocus UTI: 
- completed abx Above d/w the pt's RN Interval History:   
Sleeping now Agitated when awake Cr up , UOP down significantly CI, CO, SVO2 stable CVP 5-7 Review of Systems: UNABLE TO obtain ROS Current Medications:  
Current Facility-Administered Medications Medication Dose Route Frequency  0.45% sodium chloride with KCl 20 mEq/L infusion   IntraVENous CONTINUOUS  
  albumin human 5% (BUMINATE) solution 12.5 g  12.5 g IntraVENous ONCE  potassium chloride 20 mEq in 50 ml IVPB  20 mEq IntraVENous Q1H  
 levETIRAcetam (KEPPRA) 250 mg in 0.9% sodium chloride 100 mL IVPB  250 mg IntraVENous Q12H  
 0.9% sodium chloride infusion  6 mL/hr IntraVENous CONTINUOUS  
 albuterol-ipratropium (DUO-NEB) 2.5 MG-0.5 MG/3 ML  3 mL Nebulization Q4H RT  
 allopurinol (ZYLOPRIM) tablet 100 mg  100 mg Oral DAILY  sildenafil (REVATIO) 2 mg/mL oral suspension 20 mg  20 mg Oral Q8H  
 pantoprazole (PROTONIX) 40 mg in 0.9% sodium chloride 10 mL injection  40 mg IntraVENous DAILY  aspirin (ASA) suppository 75 mg  75 mg Rectal DAILY  arformoterol (BROVANA) neb solution 15 mcg  15 mcg Nebulization BID RT And  
 budesonide (PULMICORT) 500 mcg/2 ml nebulizer suspension  500 mcg Nebulization BID RT  
 milrinone (PRIMACOR) 20 MG/100 ML D5W infusion  0.2 mcg/kg/min IntraVENous CONTINUOUS  piperacillin-tazobactam (ZOSYN) 3.375 g in 0.9% sodium chloride (MBP/ADV) 100 mL  3.375 g IntraVENous Q8H  
 magnesium oxide (MAG-OX) tablet 400 mg  400 mg Oral DAILY  artificial saliva (MOUTH KOTE) 1 Spray  1 Spray Oral PRN  
 lactobac ac& pc-s.therm-b.anim (TIAN Q/RISAQUAD)  1 Cap Oral DAILY  oxyCODONE IR (ROXICODONE) tablet 5 mg  5 mg Oral Q6H PRN  
 balsam peru-castor oil (VENELEX) ointment   Topical TID  [Held by provider] tamsulosin (FLOMAX) capsule 0.4 mg  0.4 mg Oral DAILY  insulin lispro (HUMALOG) injection   SubCUTAneous AC&HS  Warfarin MD/NP dosing   Other PRN  
 epoetin yvonne-epbx (RETACRIT) injection 20,000 Units  20,000 Units SubCUTAneous Q7D  
 [Held by provider] lidocaine (LIDODERM) 5 % patch 1 Patch  1 Patch TransDERmal Q24H  
 sodium chloride (NS) flush 5-40 mL  5-40 mL IntraVENous Q8H  
 sodium chloride (NS) flush 5-40 mL  5-40 mL IntraVENous PRN  
 acetaminophen (TYLENOL) tablet 650 mg  650 mg Oral Q6H PRN  
  naloxone (NARCAN) injection 0.4 mg  0.4 mg IntraVENous PRN  
 ondansetron (ZOFRAN) injection 4 mg  4 mg IntraVENous Q4H PRN  
 [Held by provider] senna-docusate (PERICOLACE) 8.6-50 mg per tablet 1 Tab  1 Tab Oral DAILY  [Held by provider] polyethylene glycol (MIRALAX) packet 17 g  17 g Oral DAILY  bisacodyl (DULCOLAX) tablet 5 mg  5 mg Oral DAILY PRN  
 [Held by provider] sucralfate (CARAFATE) tablet 1 g  1 g Oral AC&HS  influenza vaccine 2019-20 (6 mos+)(PF) (FLUARIX/FLULAVAL/FLUZONE QUAD) injection 0.5 mL  0.5 mL IntraMUSCular PRIOR TO DISCHARGE  hydrALAZINE (APRESOLINE) 20 mg/mL injection 20 mg  20 mg IntraVENous Q6H PRN  
 dextrose 10 % infusion 125-250 mL  125-250 mL IntraVENous PRN No Known Allergies Objective: 
Vitals:   
Vitals:  
 12/07/19 0556 12/07/19 0600 12/07/19 0700 12/07/19 0701 BP:      
Pulse:  87 84 Resp:  26 20 Temp:      
SpO2:  100% 100% 100% Weight: 81.2 kg (179 lb 0.2 oz) Height:      
 
Intake and Output: 
No intake/output data recorded. 12/05 1901 - 12/07 0700 In: 2574.4 [P.O.:120; I.V.:2014.4] Out: 9333 [[id number]:2302] Physical Examination: 
 
General: In bed, restless legs Neck:  Lines + Resp:  Decreased bibasilar breath sounds; no resp distress CV:  VAD sounds; no LE edema GI:  Soft and non-tender; no distension Neurologic:  Sleeping :  + neal; clear urine [x]    High complexity decision making was performed 
[x]    Patient is at high-risk of decompensation with multiple organ involvement Lab Data Personally Reviewed: I have reviewed all the pertinent labs, microbiology data and radiology studies during assessment. Recent Labs 12/07/19 
0401 12/06/19 
0339 12/05/19 
1550 12/05/19 
1036 12/05/19 
0321 * 143 141 140 140  
K 3.2* 3.9 3.7 4.6 3.0*  
* 107 103 103 100 CO2 30 31 33* 31 31 * 98 136* 116* 110* BUN 29* 24* 23* 20 19 CREA 3.01* 2.38* 2.05* 1.92* 1.52* CA 9.4 9.3 9.2 9.6 9.2 MG 2.6* 2.4  --   --  2.3 ALB 3.1* 3.2*  --   --  2.9*  
SGOT 12* 14*  --   --  11* ALT 9* 7*  --   --  7* INR 5.0* 4.0*  --   --  2.8* Recent Labs 12/07/19 
0401 12/06/19 
4466 12/05/19 
0321 WBC 5.1 5.1 5.0 HGB 8.4* 8.3* 9.1*  
HCT 28.3* 27.9* 29.9*  
 201 203 No results found for: SDES Lab Results Component Value Date/Time Culture result: NO GROWTH AFTER 6 HOURS 12/06/2019 11:23 PM  
 Culture result: HEAVY ENTEROCOCCUS SPECIES NOTED (A) 11/27/2019 11:10 AM  
 Culture result: LIGHT YEAST (A) 11/27/2019 11:10 AM  
 Culture result: NO GROWTH 5 DAYS 11/12/2019 11:18 AM  
 Culture result: SERRATIA MARCESCENS (A) 10/31/2019 10:05 AM  
 Culture result: SERRATIA MARCESCENS (2ND COLONY TYPE/STRAIN) (A) 10/31/2019 10:05 AM  
 Culture result: ENTEROCOCCUS FAECALIS GROUP D (A) 10/31/2019 10:05 AM  
 
Recent Results (from the past 24 hour(s)) GLUCOSE, POC Collection Time: 12/06/19 11:38 AM  
Result Value Ref Range Glucose (POC) 100 65 - 100 mg/dL Performed by Terry Zarate   
POC VENOUS BLOOD GAS Collection Time: 12/06/19  5:25 PM  
Result Value Ref Range Device: ROOM AIR    
 pH, venous (POC) 7.462 (H) 7.32 - 7.42    
 pCO2, venous (POC) 38.8 (L) 41 - 51 MMHG  
 pO2, venous (POC) 29 25 - 40 mmHg HCO3, venous (POC) 27.7 23.0 - 28.0 MMOL/L  
 sO2, venous (POC) 59 (L) 65 - 88 % Base excess, venous (POC) 4 mmol/L Allens test (POC) N/A Total resp. rate 23 Site CHITO Montoya Specimen type (POC) MIXED VENOUS    
GLUCOSE, POC Collection Time: 12/06/19  5:37 PM  
Result Value Ref Range Glucose (POC) 98 65 - 100 mg/dL Performed by Rodgers Muse, POC Collection Time: 12/06/19  9:09 PM  
Result Value Ref Range Glucose (POC) 105 (H) 65 - 100 mg/dL Performed by Cynthia Plascencia CULTURE, BLOOD, PAIRED Collection Time: 12/06/19 11:23 PM  
Result Value Ref Range Special Requests: NO SPECIAL REQUESTS Culture result: NO GROWTH AFTER 6 HOURS    
POC VENOUS BLOOD GAS Collection Time: 12/07/19  3:44 AM  
Result Value Ref Range Device: NASAL CANNULA Flow rate (POC) 2 L/M  
 pH, venous (POC) 7.409 7.32 - 7.42    
 pCO2, venous (POC) 44.6 41 - 51 MMHG  
 pO2, venous (POC) 35 25 - 40 mmHg HCO3, venous (POC) 28.2 (H) 23.0 - 28.0 MMOL/L  
 sO2, venous (POC) 67 65 - 88 % Base excess, venous (POC) 4 mmol/L Allens test (POC) N/A Total resp. rate 18 Site SWAN [employer] Specimen type (POC) MIXED VENOUS    
DIGOXIN Collection Time: 12/07/19  3:59 AM  
Result Value Ref Range Digoxin level 1.4 0.90 - 3.09 NG/ML  
METABOLIC PANEL, COMPREHENSIVE Collection Time: 12/07/19  4:01 AM  
Result Value Ref Range Sodium 147 (H) 136 - 145 mmol/L Potassium 3.2 (L) 3.5 - 5.1 mmol/L Chloride 111 (H) 97 - 108 mmol/L  
 CO2 30 21 - 32 mmol/L Anion gap 6 5 - 15 mmol/L Glucose 145 (H) 65 - 100 mg/dL BUN 29 (H) 6 - 20 MG/DL Creatinine 3.01 (H) 0.70 - 1.30 MG/DL  
 BUN/Creatinine ratio 10 (L) 12 - 20 GFR est AA 25 (L) >60 ml/min/1.73m2 GFR est non-AA 21 (L) >60 ml/min/1.73m2 Calcium 9.4 8.5 - 10.1 MG/DL Bilirubin, total 1.0 0.2 - 1.0 MG/DL  
 ALT (SGPT) 9 (L) 12 - 78 U/L  
 AST (SGOT) 12 (L) 15 - 37 U/L Alk. phosphatase 97 45 - 117 U/L Protein, total 6.5 6.4 - 8.2 g/dL Albumin 3.1 (L) 3.5 - 5.0 g/dL Globulin 3.4 2.0 - 4.0 g/dL A-G Ratio 0.9 (L) 1.1 - 2.2 MAGNESIUM Collection Time: 12/07/19  4:01 AM  
Result Value Ref Range Magnesium 2.6 (H) 1.6 - 2.4 mg/dL CBC W/O DIFF Collection Time: 12/07/19  4:01 AM  
Result Value Ref Range WBC 5.1 4.1 - 11.1 K/uL  
 RBC 2.85 (L) 4.10 - 5.70 M/uL HGB 8.4 (L) 12.1 - 17.0 g/dL HCT 28.3 (L) 36.6 - 50.3 % MCV 99.3 (H) 80.0 - 99.0 FL  
 MCH 29.5 26.0 - 34.0 PG  
 MCHC 29.7 (L) 30.0 - 36.5 g/dL  
 RDW 17.9 (H) 11.5 - 14.5 % PLATELET 745 000 - 153 K/uL  MPV 9.0 8.9 - 12.9 FL  
 NRBC 0.0 0  WBC ABSOLUTE NRBC 0.00 0.00 - 0.01 K/uL PROTHROMBIN TIME + INR Collection Time: 12/07/19  4:01 AM  
Result Value Ref Range INR 5.0 (HH) 0.9 - 1.1 Prothrombin time 46.3 (H) 9.0 - 11.1 sec PTT Collection Time: 12/07/19  4:01 AM  
Result Value Ref Range aPTT 51.3 (H) 22.1 - 32.0 sec  
 aPTT, therapeutic range     58.0 - 77.0 SECS  
LACTIC ACID Collection Time: 12/07/19  4:01 AM  
Result Value Ref Range Lactic acid 1.1 0.4 - 2.0 MMOL/L  
TYPE & SCREEN Collection Time: 12/07/19  4:01 AM  
Result Value Ref Range Crossmatch Expiration 12/10/2019 ABO/Rh(D) O POSITIVE Antibody screen NEG Comment Previously identified nonspecific antibody and nonspecific cold antibody ROYER Poly POS   
 ROYER IgG POS   
 ANTIBODY ELUTED ELUATE-NEGATIVE   
 ROYER C3b/C3d NEG Unit number [id number] Blood component type RC LR Unit division 00 Status of unit ALLOCATED Crossmatch result Compatible LD Collection Time: 12/07/19  4:01 AM  
Result Value Ref Range  (H) 85 - 241 U/L  
NT-PRO BNP Collection Time: 12/07/19  4:01 AM  
Result Value Ref Range NT pro-BNP 20,935 (H) <125 PG/ML Michael Llanes MD

## 2019-12-07 NOTE — PROGRESS NOTES
ID Progress Note 
2019 Subjective:  
 
Quite weak today--fluid issues--in ICU setting--worsening mental state over the course of the day Objective: Antibiotics: 1. Levaquin 2. Rifampin 3. Fluconazole 4. Vancomycin 5. Cefazolin 6. Zosyn Vitals:  
Visit Vitals BP (!) 74/56 Pulse 98 Temp 100.1 °F (37.8 °C) Resp 26 Ht 6' 2\" (1.88 m) Wt 81.6 kg (179 lb 14.3 oz) SpO2 93% BMI 23.10 kg/m² Tmax:  Temp (24hrs), Av.9 °F (37.2 °C), Min:98.1 °F (36.7 °C), Max:100.1 °F (37.8 °C) Exam:  Lungs:  clear to auscultation bilaterally Heart:  regular rate and rhythm Abdomen:  soft, non-tender. Bowel sounds normal. No masses,  no organomegaly Urine is clearing up Labs:     
Recent Labs 19 
4550 19 
1550 19 
1036 19 
0321 19 
0315 WBC 5.1  --   --  5.0 5.3 HGB 8.3*  --   --  9.1* 9.0*  
  --   --  203 235 BUN 24* 23* 20 19 20 CREA 2.38* 2.05* 1.92* 1.52* 1.69* SGOT 14*  --   --  11* 13* AP 98  --   --  106 115 TBILI 1.1*  --   --  1.1* 0.9 Cultures: No results found for: [facility] Lab Results Component Value Date/Time Culture result: HEAVY ENTEROCOCCUS SPECIES NOTED (A) 2019 11:10 AM  
 Culture result: LIGHT YEAST (A) 2019 11:10 AM  
 Culture result: NO GROWTH 5 DAYS 2019 11:18 AM  
 
 
Radiology:  
 
Line/Insert Date:        
 
Assessment:  
 
1. UTI--Serratia (2 biotypes) from culture and small amount of Enterococcus 2. CHF/cardiomyopathy 3. ?aspiration event(s) 4. Renal insufficiency Objective: 1. Continue current therapy 2. Group will see over the weekend if asked January Mann MD

## 2019-12-07 NOTE — PROGRESS NOTES
Attempted to see patient for follow up PT session, and discussed with RN. He is currently sleeping soundly after not sleeping all night and plan for head CT today. We will follow up tomorrow to progress as he is able to tolerate.  
 
Judith Cates, PT

## 2019-12-07 NOTE — PROGRESS NOTES
2000- Bedside and Verbal shift change report given to [address] (oncoming nurse) by MANJEET RN (offgoing nurse). Report included the following information SBAR, Procedure Summary, Intake/Output, Recent Results and Cardiac Rhythm Paced. 2240- Bridle placed on Dobhoff by ICU nurse. 2244- Called HF about patient confusion and temp of 101.4. Stated to get Blood and sputum cultures. No medications to keep pt calm at this time due to altered mental status. 0000- Pt reassessed. No change except that pt seems to be calming down after receiving Tylenol and a roxicodone for fever and discomfort. 0400- Pt reassessed. No change. Labs sent. 0505- INR 5.0. Contacted Dr. Bailey Castaneda. Stated to give Vitamin K based on Pharmacies recommendation. Spoke with Patricia Pharmacist. She stated that according to the 2012 Chest guidelines, that Vitamin K is not to be given between an INR of 4.5 and 10 without signs of bleeding. Will hold off giving Vitamin k at this time. Will inform [facility] and let them determine plan upon arrival in am.  
 
0800- Bedside and Verbal shift change report given to AP RN (oncoming nurse) by Tory Vasquez RN (offgoing nurse). Report included the following information SBAR, Procedure Summary, Intake/Output, Recent Results and Cardiac Rhythm Paced.

## 2019-12-07 NOTE — PROCEDURES
[address]  
EEG Name:  Narda Pineda 
MR#:  [id number] :  1950 ACCOUNT #:  [de-identified] DATE OF SERVICE:  2019 REQUESTING PHYSICIAN:  Kim Bernstein MD 
 
HISTORY:  The patient is 61-year-old male, who is being evaluated for abnormal involuntary twitching in the extremities. DESCRIPTION:  This is an 18-channel EEG performed on an awake and drowsy patient. During wakefulness, the dominant posterior background rhythm consists of medium voltage rhythms in the 7-8 Hz frequency range. Intermittently, brief frontally dominant bursts of delta slowing were also seen for brief periods lasting 0.5 up to 2 seconds. Drowsiness was characterized by slowing and vertex waves. Photic stimulation did not elicit driving response. Hyperventilation was not performed. EEG SUMMARY:  Abnormal EEG due to mild slowing of the background rhythms with intermittent bursts of moderate slowing. CLINICAL INTERPRETATION:  This EEG is suggestive of a mild generalized encephalopathic process, nonspecific in type. This may be related to an underlying structural brain injury and/or toxic/metabolic abnormality. No clearly lateralizing or epileptiform features were noted. No seizure was recorded. Carolanne Cogan, MD 
 
 
[id number] 
D:  2019 15:41 T:  2019 0:11 JOB #:  [id number]

## 2019-12-08 NOTE — PROGRESS NOTES
[facility] [facility] in [address], [place] Inpatient Progress Note Patient name: Nickie Reddy Patient : 1950 Patient MRN: [id number] Attending MD: Vika Ibarra MD 
Date of service: 19 Chief Complaint:  
Rolando Pettit azucena LVAD implant 
  
HPI: Sona Kiser is a 71y.o. year old pleasant white male with a history of HTN, HLD, JOSE, CAD s/p cardiac arrest VFib s/p CABG () c/b sternal would infection and sternectomy, ischemic cardiomyopathy LVEF 15-20%, s/p ICD and with LBBB. He was admitted with acute on chronic systolic heart failure with massive volume overload > 20 lbs, in the setting of atrial fibrillation s/p failed DCCV and underwent DCCV and RHC on .  S/p BiVICD on 19 with Dr. Rosemarie Rees. He was discharged home home on IV milrinone on 19. Mortimer Fabry has been followed closely by Dr. Sandra Ring and the [facility]. 
  
Mr. Kiser was admitted for acute on chronic systolic heart failure. He underwent implantation of Impella 5.0 due to CS and has completed his LVAD evaluation. Mortimer Fabry meets criteria for implant of HM3 as DT. He was NYHA Class IV prior to implant of Impella 5.0, has LVEF < 15%, was intolerant of GDMT due to symptomatic hypotension and renal dysfunction. He remains dependent on temporary MCS support. RHC  revealed compensated hemodynamics on Impella. His renal function has improved dramatically with improvement in his hemodynamics. He is not a suitable transplant candidate due to single kidney, sternectomy, debility, and frailty. He was reviewed by [address] [place] [address] and felt to be a high risk heart transplant candidate due to multiple co-morbidities as well as the afore mentioned conditions.  He remained in the CCU and underwent a HM 3 implant as DT on . He was weaned off of pressors and transferred to [place] [address] where he continues to undergo PT/OT and hemodynamic optimization.   
  
24Hr Events Renal function worse today INR improved Myoclonus improved More alert/oriented today No VAD alarms  
  
Procedure:  Procedure(s): REMOVAL TEMPORARY LEFT VENTRICULAR ASSIST DEVICE, IMPLANTATION OF LEFT VENTRICULAR ASSIST DEVICE PERMANENT (VAD), ECC, JACQUE, EPI AORTIC US BY DR Christian Gaming.    
SM 
  
Impression / Plan:  
Heart Failure Status: NYHA Class IV INTERMACS Category 2 
  
Chronic systolic heart failure due to ICM, NYHA Class IV, EF < 15%, cardiogenic shock bridged with Impella 5.0 support to HM 3 implant S/p Impella removal and LVAD implant Continue RPMs at 5600 - LVIDd down to 5.3 cm  
TTE with bubble study negative for PFO Decrease Milrinone to 0.1mcg/kg/min due to renal dysfunction- monitor Bumex today- low dose Unable to obtain CardioMEMS reading d/t interference Cont Sildenafil 20 mg PO TID - rx sent to local pharmacy to initiate PA No AA/ARB/ARNI post op- will start as appropriate Strict I/O, daily weights, Na+ restricted diet Continue LVAD education Daily dressing changes Generalized myoclonus Improved Appreciate Neurology input Cont Keppra- renally dosed, if creatinine continues to climb will readdress Head CT negative for acute process- chronic changes only EEG? Monitor closely Remain in CVICU 
  
Anticoagulation for LVAD, INR goal 2-3 Continue ASA INR 1.5 today Resume low dose coumadin  
  
Acute Respiratory Failure post op Improved with bumex gtt, milrinone gtt CXR and O2 requirement improved TTE with Bubble study negative for PFO Pulmonary Consult appreciated Pulmonary hygiene Cont home CPAP- must use while sleeping  
  
Post op Pain PRN Tylenol Comfort measures  
  
L Brachial Artery Thrombosis s/p thrombectomy Surgical management per Dr. Daniel Limon Pain improved  
  
Swelling, pain of right arm, acute Doppler studies (-) for thrombus  
Continue AC Confirmed PICC placement 
  
Acute on CKD 3, atrophic left kidney Creatinine continues to rise Appreciate Nephrology assistance IV milrinone  
bumex today per Dr. Gopi Nieves Avoid nephrotoxins Trend labs ProNTBNP remains elevated 
  
CAD s/p CABG Cont low dose ASA- watch platelets No BB d/t RV failure Hold statin due to recent hepatic failure LHC performed 11/13 - low likelihood of benefit from revascularization  
  
Hx of VF arrest s/p BiV-ICD No recent shocks Keep K > 4 and Mg > 2  
   
PAF Currently in ST, Paced Hold digoxin - level 1.4 today - do not resume until < 1 Repeat TFTs WNL 
  
Hypokalemia IV repletion until taking PO 
  
Hx of gout Uric acid WNL 
  
Suspected aspiration pneumonia RUL consolidation Suspect aspiration related to over sedation Limit sedatives Sputum culture Zosyn- renally dosed  
  
Urinary retention, c/b serratia UTI and hematuria Appreciate Urology consult Cystoscopy performed 11/13 shows catheter induced cystitis Repeat UA shows resolution of UTI  
  
Malnutrition Appreciate Nutritionist consult 
 prealbumin weekly 6 small meals daily once taking PO Cont TF via dobhoff Hold mirtazapine d/t tremors, confusion  
  
COPD Appreciate Pulmonology assistance Continue nebs PRN   
  
Anemia Multifactorial, due to hemolysis + epistaxis + hematuria Intolerant of octreotide or doxycycline for AVM ppx due to prolonged QTc Transfuse for Hgb goal > 8 
  
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  
  
Hx of sternal wound infection, sternectomy Sternum closed post op- monitor  
  
Vocal cord paralysis Improved ENT recs appreciated Neck CT- R neck edema, no airway compromise Speech therapy following - appreciate input 
  
Debility Continue PT/OT  
  
Ppx Protonix PT/OT Bowel regimen Cont Mechanical soft, no thin liquids PICC placed 
  
Dispo: 
Likely will need IP rehab. Remain in CVICU for now. LVAD INTERROGATION: 
Device interrogated in person Device function normal, normal flow, no events LVAD Pump Speed (RPM): 4343 Pump Flow (LPM): 4.7 MAP: 88 
PI (Pulsitility Index): 3.3 Power: 4.2  Test: No 
Back Up  at Bedside & Labeled: Yes Power Module Test: No 
Driveline Site Care: No 
Driveline Dressing: Clean, Dry, and Intact Outpatient: No 
MAP in Therapeutic Range (Outpatient): Yes Testing Alarms Reviewed: Yes 
Back up SC speed: 5600 Back up Low Speed Limit: 5200 Emergency Equipment with Patient?: Yes Emergency procedures reviewed?: No 
Drive line site inspected?: No 
Drive line intergrity inspected?: Yes Drive line dressing changed?: No 
 
PHYSICAL EXAM: 
Visit Vitals BP (!) 74/56 Pulse 82 Temp 97.8 °F (36.6 °C) Resp 20 Ht 6' 2\" (1.88 m) Wt 190 lb 0.6 oz (86.2 kg) SpO2 90% BMI 24.40 kg/m² Physical Exam  
Constitutional: He is oriented to person, place, and time. He appears well-developed and well-nourished. No distress. More fatigued today HENT:  
Head: Normocephalic. Neck: Normal range of motion. Neck supple. No JVD present. Cardiovascular: Regular rhythm. + VAD hum Pulmonary/Chest: Effort normal and breath sounds normal. No respiratory distress. Abdominal: Soft. Bowel sounds are normal. He exhibits no distension. Dobhoff in place Musculoskeletal: Normal range of motion. General: No edema. Neurological: He is alert and oriented to person, place, and time. Intermittent confusion Skin: Skin is warm and dry. Nursing note and vitals reviewed. REVIEW OF SYSTEMS: 
Review of Systems Constitutional: Positive for fever and malaise/fatigue. Negative for chills. HENT: Positive for hearing loss. Respiratory: Negative for cough and shortness of breath. Cardiovascular: Negative for chest pain, palpitations, orthopnea and leg swelling. Gastrointestinal: Negative for heartburn, nausea and vomiting. Genitourinary: Negative. Musculoskeletal: Negative. Neurological: Positive for weakness. Negative for dizziness and headaches. Endo/Heme/Allergies: Negative. PAST MEDICAL HISTORY: 
Past Medical History:  
Diagnosis Date  Degenerative disc disease, lumbar  Heart failure ([facility] [address]  High cholesterol  Hypertension  Paroxysmal atrial fibrillation ([facility] [address] 2019  Spinal stenosis PAST SURGICAL HISTORY: 
Past Surgical History:  
Procedure Laterality Date  COLONOSCOPY Left 2019 COLONOSCOPY at bedside performed by Deo Pagan MD at [address]  HX CORONARY ARTERY BYPASS GRAFT    
 triple  HX HERNIA REPAIR    
 HX IMPLANTABLE CARDIOVERTER DEFIBRILLATOR  GA CARDIOVERSION ELECTIVE ARRHYTHMIA EXTERNAL N/A 6/10/2019 EP CARDIOVERSION performed by Aury David MD at [address] CATH LAB  GA CARDIOVERSION ELECTIVE ARRHYTHMIA EXTERNAL N/A 2019 EP CARDIOVERSION performed by Ina Luque MD at [address] CATH LAB  GA INSJ/RPLCMT PERM DFB W/TRNSVNS LDS 1/DUAL CHMBR N/A 2019 INSERT ICD BIV MULTI performed by Xenia Pat MD at [address] CATH LAB  GA TCAT IMPL WRLS P-ART PRS SNR L-T HEMODYN MNTR N/A 2019 IMPLANT HEART FAILURE MONITORING DEVICE performed by Miles Garza MD at [address] CATH LAB FAMILY HISTORY: 
Family History Problem Relation Age of Onset  Lung Disease Mother  Hypertension Mother Alyne Andrew Arthritis-osteo Mother  Heart Disease Mother  Heart Disease Father  Diabetes Father SOCIAL HISTORY: 
Social History Socioeconomic History  Marital status:  Spouse name: Not on file  Number of children: Not on file  Years of education: Not on file  Highest education level: Not on file Tobacco Use  Smoking status: Former Smoker Last attempt to quit: 2010 Years since quittin.0  Smokeless tobacco: Never Used Substance and Sexual Activity  Alcohol use: Not Currently Comment: rarely  Drug use: Never Other Topics Concern LABORATORY RESULTS: 
  
Labs Latest Ref Rng & Units 12/8/2019 12/7/2019 12/6/2019 12/5/2019 12/5/2019 12/5/2019 12/4/2019 WBC 4.1 - 11.1 K/uL 5.5 5.1 5.1 - - 5.0 5.3  
RBC 4.10 - 5.70 M/uL 2.68(L) 2.85(L) 2.79(L) - - 3.07(L) 3.00(L) Hemoglobin 12.1 - 17.0 g/dL 8. 0(L) 8.4(L) 8. 3(L) - - 9. 1(L) 9.0(L) Hematocrit 36.6 - 50.3 % 26. 9(L) 28. 3(L) 27. 9(L) - - 29. 9(L) 29. 6(L) MCV 80.0 - 99.0 . 4(H) 99. 3(H) 100. 0(H) - - 97.4 98.7 Platelets 517 - 448 K/uL 146(L) 174 201 - - 203 235 Lymphocytes 12 - 49 % - - - - - - - Monocytes 5 - 13 % - - - - - - - Eosinophils 0 - 7 % - - - - - - - Basophils 0 - 1 % - - - - - - - Albumin 3.5 - 5.0 g/dL 2. 9(L) 3. 1(L) 3. 2(L) - - 2. 9(L) 2. 7(L) Calcium 8.5 - 10.1 MG/DL 8.6 9.4 9.3 9.2 9.6 9.2 9.1 SGOT 15 - 37 U/L 14(L) 12(L) 14(L) - - 11(L) 13(L) Glucose 65 - 100 mg/dL 142(H) 145(H) 98 136(H) 116(H) 110(H) 91 BUN 6 - 20 MG/DL 34(H) 29(H) 24(H) 23(H) 20 19 20 Creatinine 0.70 - 1.30 MG/DL 3.46(H) 3.01(H) 2.38(H) 2.05(H) 1.92(H) 1.52(H) 1.69(H) Sodium 136 - 145 mmol/L 147(H) 147(H) 143 141 140 140 138 Potassium 3.5 - 5.1 mmol/L 3.9 3.2(L) 3.9 3.7 4.6 3. 0(L) 4.1 TSH 0.36 - 3.74 uIU/mL - - - - - - -  
PSA 0.01 - 4.0 ng/mL - - - - - - -  
LDH 85 - 241 U/L 247(H) 250(H) 234 - - 249(H) 286(H) CEA ng/mL - - - - - - - Some recent data might be hidden Lab Results Component Value Date/Time TSH 2.30 12/03/2019 03:29 AM  
 TSH 2.40 10/25/2019 07:39 PM  
 TSH 2.45 06/01/2019 04:16 AM  
 
 
ALLERGY: 
No Known Allergies CURRENT MEDICATIONS: 
Current Facility-Administered Medications Medication Dose Route Frequency  potassium chloride 20 mEq in 50 ml IVPB  20 mEq IntraVENous ONCE  warfarin (COUMADIN) tablet 1 mg  1 mg Oral ONCE  piperacillin-tazobactam (ZOSYN) 3.375 g in 0.9% sodium chloride (MBP/ADV) 100 mL  3.375 g IntraVENous Q12H  aspirin chewable tablet 81 mg  81 mg Oral DAILY  levETIRAcetam (KEPPRA) 250 mg in 0.9% sodium chloride 100 mL IVPB  250 mg IntraVENous Q12H  
 0.9% sodium chloride infusion  6 mL/hr IntraVENous CONTINUOUS  
 albuterol-ipratropium (DUO-NEB) 2.5 MG-0.5 MG/3 ML  3 mL Nebulization Q4H RT  
 allopurinol (ZYLOPRIM) tablet 100 mg  100 mg Oral DAILY  sildenafil (REVATIO) 2 mg/mL oral suspension 20 mg  20 mg Oral Q8H  
 pantoprazole (PROTONIX) 40 mg in 0.9% sodium chloride 10 mL injection  40 mg IntraVENous DAILY  arformoterol (BROVANA) neb solution 15 mcg  15 mcg Nebulization BID RT And  
 budesonide (PULMICORT) 500 mcg/2 ml nebulizer suspension  500 mcg Nebulization BID RT  
 milrinone (PRIMACOR) 20 MG/100 ML D5W infusion  0.2 mcg/kg/min IntraVENous CONTINUOUS  
 magnesium oxide (MAG-OX) tablet 400 mg  400 mg Oral DAILY  artificial saliva (MOUTH KOTE) 1 Spray  1 Spray Oral PRN  
 lactobac ac& pc-s.therm-b.anim (TIAN Q/RISAQUAD)  1 Cap Oral DAILY  oxyCODONE IR (ROXICODONE) tablet 5 mg  5 mg Oral Q6H PRN  
 balsam peru-castor oil (VENELEX) ointment   Topical TID  [Held by provider] tamsulosin (FLOMAX) capsule 0.4 mg  0.4 mg Oral DAILY  insulin lispro (HUMALOG) injection   SubCUTAneous AC&HS  Warfarin MD/NP dosing   Other PRN  
 epoetin yvonne-epbx (RETACRIT) injection 20,000 Units  20,000 Units SubCUTAneous Q7D  
 [Held by provider] lidocaine (LIDODERM) 5 % patch 1 Patch  1 Patch TransDERmal Q24H  
 sodium chloride (NS) flush 5-40 mL  5-40 mL IntraVENous Q8H  
 sodium chloride (NS) flush 5-40 mL  5-40 mL IntraVENous PRN  
 acetaminophen (TYLENOL) tablet 650 mg  650 mg Oral Q6H PRN  
 naloxone (NARCAN) injection 0.4 mg  0.4 mg IntraVENous PRN  
 ondansetron (ZOFRAN) injection 4 mg  4 mg IntraVENous Q4H PRN  
 [Held by provider] senna-docusate (PERICOLACE) 8.6-50 mg per tablet 1 Tab  1 Tab Oral DAILY  [Held by provider] polyethylene glycol (MIRALAX) packet 17 g  17 g Oral DAILY  bisacodyl (DULCOLAX) tablet 5 mg  5 mg Oral DAILY PRN  
 [Held by provider] sucralfate (CARAFATE) tablet 1 g  1 g Oral AC&HS  influenza vaccine 2019-20 (6 mos+)(PF) (FLUARIX/FLULAVAL/FLUZONE QUAD) injection 0.5 mL  0.5 mL IntraMUSCular PRIOR TO DISCHARGE  hydrALAZINE (APRESOLINE) 20 mg/mL injection 20 mg  20 mg IntraVENous Q6H PRN  
 dextrose 10 % infusion 125-250 mL  125-250 mL IntraVENous PRN Thank you for allowing me to participate in this patient's care. Riccardo Cockayne, NP [address], [place] [facility] [place] Phone: [phone] Fax: [phone]

## 2019-12-08 NOTE — PROGRESS NOTES
[facility] 
 [address] [facility], [address] [facility], [facility] Phone: [phone]   [phone]   
  
Nephrology Progress Note Sona Kiser     1950     [id number] Date of Admission : 10/25/2019 
12/08/19 CC:  Follow up for JUAN J, CKD, Hyponatremia Assessment and Plan JUAN J on CKD 
- likely 2/2 Over diuresis w/ Bumex gtt earlier in the week, could possibly suffered ATN  From Hemodynamic/ perfusional issues, sepsis - Cr rising despite IVF yesterday  ==> NOT a good sign  
- stopped IVF due to worsening Pulm edema/ Effusion on CXR  
- one dose of IV bumex - No emergent need for HD and hoping to avoid it  
- expect Cr to continue to rise Coagulopathy : 
- No active bleeding: watch H/H Hypokalemia  
- replete PRN Hypernatremia : 
- continue  cc Q4hr Myoclonus and Encephalopathy Possible CVA, 3 rd nerve palsy  
- unable to get CTA/MRA 
- per neuro - On Keppra 
  
Gross hematuria, BPH w/ retention: 
- off CBI pre VAD. cysto pre op showed catheter related Cystitis @ postr wall  
- neal had to be replaced due to urinary retention 
- flomax - on hold  
- keep neal for now until he is stable from CHF stand point Acute on Chronic HFrEF  
- EF 16-20%, NYHA Class IV , hx of VF arrest - s/p AICD 
- Impella insertion 10/29- removed 11/18  
- s/p LVAD placement on 11/18 Hoarseness, vocal cord paralysis, mediastinal adenopathy: 
- will need eventual PET/CT 
  
L arm clot s/p thrombectomy on 11/25 JOSE on CPAP  
  
PAF s/p DCCV 6/19 
  
Anemia: 
- from blood loss s/p multiple blood products - s/p IV iron,  Repeat iron studies ok 
- on PAVEL q7 d Serratia and Enteroccocus UTI: 
- completed abx Above d/w the pt's RN Interval History: MS reportedly better Cr worse UOP poor Hemodynamically stable Review of Systems: UNABLE TO obtain ROS Current Medications:  
Current Facility-Administered Medications Medication Dose Route Frequency  potassium chloride 20 mEq in 50 ml IVPB  20 mEq IntraVENous ONCE  
 bumetanide (BUMEX) injection 1 mg  1 mg IntraVENous ONCE  piperacillin-tazobactam (ZOSYN) 3.375 g in 0.9% sodium chloride (MBP/ADV) 100 mL  3.375 g IntraVENous Q12H  aspirin chewable tablet 81 mg  81 mg Oral DAILY  LORazepam (ATIVAN) injection 1 mg  1 mg IntraVENous ONCE PRN  
 levETIRAcetam (KEPPRA) 250 mg in 0.9% sodium chloride 100 mL IVPB  250 mg IntraVENous Q12H  
 0.9% sodium chloride infusion  6 mL/hr IntraVENous CONTINUOUS  
 albuterol-ipratropium (DUO-NEB) 2.5 MG-0.5 MG/3 ML  3 mL Nebulization Q4H RT  
 allopurinol (ZYLOPRIM) tablet 100 mg  100 mg Oral DAILY  sildenafil (REVATIO) 2 mg/mL oral suspension 20 mg  20 mg Oral Q8H  
 pantoprazole (PROTONIX) 40 mg in 0.9% sodium chloride 10 mL injection  40 mg IntraVENous DAILY  arformoterol (BROVANA) neb solution 15 mcg  15 mcg Nebulization BID RT And  
 budesonide (PULMICORT) 500 mcg/2 ml nebulizer suspension  500 mcg Nebulization BID RT  
 milrinone (PRIMACOR) 20 MG/100 ML D5W infusion  0.2 mcg/kg/min IntraVENous CONTINUOUS  
 magnesium oxide (MAG-OX) tablet 400 mg  400 mg Oral DAILY  artificial saliva (MOUTH KOTE) 1 Spray  1 Spray Oral PRN  
 lactobac ac& pc-s.therm-b.anim (TIAN Q/RISAQUAD)  1 Cap Oral DAILY  oxyCODONE IR (ROXICODONE) tablet 5 mg  5 mg Oral Q6H PRN  
 balsam peru-castor oil (VENELEX) ointment   Topical TID  [Held by provider] tamsulosin (FLOMAX) capsule 0.4 mg  0.4 mg Oral DAILY  insulin lispro (HUMALOG) injection   SubCUTAneous AC&HS  Warfarin MD/NP dosing   Other PRN  
 epoetin yvonne-epbx (RETACRIT) injection 20,000 Units  20,000 Units SubCUTAneous Q7D  
 [Held by provider] lidocaine (LIDODERM) 5 % patch 1 Patch  1 Patch TransDERmal Q24H  
 sodium chloride (NS) flush 5-40 mL  5-40 mL IntraVENous Q8H  
 sodium chloride (NS) flush 5-40 mL  5-40 mL IntraVENous PRN  
 acetaminophen (TYLENOL) tablet 650 mg  650 mg Oral Q6H PRN  
  naloxone (NARCAN) injection 0.4 mg  0.4 mg IntraVENous PRN  
 ondansetron (ZOFRAN) injection 4 mg  4 mg IntraVENous Q4H PRN  
 [Held by provider] senna-docusate (PERICOLACE) 8.6-50 mg per tablet 1 Tab  1 Tab Oral DAILY  [Held by provider] polyethylene glycol (MIRALAX) packet 17 g  17 g Oral DAILY  bisacodyl (DULCOLAX) tablet 5 mg  5 mg Oral DAILY PRN  
 [Held by provider] sucralfate (CARAFATE) tablet 1 g  1 g Oral AC&HS  influenza vaccine 2019-20 (6 mos+)(PF) (FLUARIX/FLULAVAL/FLUZONE QUAD) injection 0.5 mL  0.5 mL IntraMUSCular PRIOR TO DISCHARGE  hydrALAZINE (APRESOLINE) 20 mg/mL injection 20 mg  20 mg IntraVENous Q6H PRN  
 dextrose 10 % infusion 125-250 mL  125-250 mL IntraVENous PRN No Known Allergies Objective: 
Vitals:   
Vitals:  
 12/08/19 0600 12/08/19 0700 12/08/19 0714 12/08/19 0800 BP:      
Pulse: 70 77  72 Resp: 17 20  22 Temp:      
SpO2: 92% 90% Weight:   86.2 kg (190 lb 0.6 oz) Height:      
 
Intake and Output: 
12/08 0701 - 12/08 1900 In: 61.2 [I.V.:61.2] Out: 50 [Urine:50] 12/06 1901 - 12/08 0700 In: 5455.7 [P.O.:600; I.V.:2410.7] Out: 810 [Urine:810] Physical Examination: 
 
General: In bed, restless legs Neck:  Lines + Resp:  Decreased bibasilar breath sounds; no resp distress CV:  VAD sounds; no LE edema GI:  Soft and non-tender; no distension Neurologic:  Sleeping :  + neal; clear urine [x]    High complexity decision making was performed 
[x]    Patient is at high-risk of decompensation with multiple organ involvement Lab Data Personally Reviewed: I have reviewed all the pertinent labs, microbiology data and radiology studies during assessment. Recent Labs 12/08/19 
0334 12/07/19 
0401 12/06/19 
0339 12/05/19 
1550 12/05/19 
1036 * 147* 143 141 140  
K 3.9 3.2* 3.9 3.7 4.6 * 111* 107 103 103 CO2 30 30 31 33* 31 * 145* 98 136* 116* BUN 34* 29* 24* 23* 20  
CREA 3.46* 3.01* 2.38* 2.05* 1.92* CA 8.6 9.4 9.3 9.2 9.6 MG 2.3 2.6* 2.4  --   --   
ALB 2.9* 3.1* 3.2*  --   --   
SGOT 14* 12* 14*  --   --   
ALT 7* 9* 7*  --   --   
INR 1.5* 5.0* 4.0*  --   --   
 
Recent Labs 12/08/19 
5641 12/07/19 
0401 12/06/19 
4039 WBC 5.5 5.1 5.1 HGB 8.0* 8.4* 8.3* HCT 26.9* 28.3* 27.9*  
* 174 201 No results found for: SDES Lab Results Component Value Date/Time Culture result: NO GROWTH 2 DAYS 12/06/2019 11:23 PM  
 Culture result: HEAVY ENTEROCOCCUS SPECIES NOTED (A) 11/27/2019 11:10 AM  
 Culture result: LIGHT YEAST (A) 11/27/2019 11:10 AM  
 Culture result: NO GROWTH 5 DAYS 11/12/2019 11:18 AM  
 Culture result: SERRATIA MARCESCENS (A) 10/31/2019 10:05 AM  
 Culture result: SERRATIA MARCESCENS (2ND COLONY TYPE/STRAIN) (A) 10/31/2019 10:05 AM  
 Culture result: ENTEROCOCCUS FAECALIS GROUP D (A) 10/31/2019 10:05 AM  
 
Recent Results (from the past 24 hour(s)) SODIUM, UR, RANDOM Collection Time: 12/07/19  8:44 AM  
Result Value Ref Range Sodium,urine random 37 MMOL/L  
CREATININE, UR, RANDOM Collection Time: 12/07/19  8:44 AM  
Result Value Ref Range Creatinine, urine 110.00 mg/dL EOSINOPHILS, URINE Collection Time: 12/07/19  8:44 AM  
Result Value Ref Range Eosinophils,urine NEGATIVE     
GLUCOSE, POC Collection Time: 12/07/19 11:53 AM  
Result Value Ref Range Glucose (POC) 115 (H) 65 - 100 mg/dL Performed by Belle Spencer, POC Collection Time: 12/07/19  5:35 PM  
Result Value Ref Range Glucose (POC) 147 (H) 65 - 100 mg/dL Performed by Belle Spencer, POC Collection Time: 12/07/19 10:17 PM  
Result Value Ref Range Glucose (POC) 122 (H) 65 - 100 mg/dL Performed by Bubba Lugo POC VENOUS BLOOD GAS Collection Time: 12/08/19  3:26 AM  
Result Value Ref Range Device: NASAL CANNULA Flow rate (POC) 2 L/M  
 pH, venous (POC) 7.334 7.32 - 7.42    
 pCO2, venous (POC) 51.1 (H) 41 - 51 MMHG pO2, venous (POC) 37 25 - 40 mmHg HCO3, venous (POC) 27.2 23.0 - 28.0 MMOL/L  
 sO2, venous (POC) 65 65 - 88 % Base excess, venous (POC) 1 mmol/L Allens test (POC) N/A Total resp. rate 17 Site [facility] Specimen type (POC) MIXED VENOUS    
LACTIC ACID Collection Time: 12/08/19  3:30 AM  
Result Value Ref Range Lactic acid 1.2 0.4 - 2.0 MMOL/L  
METABOLIC PANEL, COMPREHENSIVE Collection Time: 12/08/19  3:34 AM  
Result Value Ref Range Sodium 147 (H) 136 - 145 mmol/L Potassium 3.9 3.5 - 5.1 mmol/L Chloride 112 (H) 97 - 108 mmol/L  
 CO2 30 21 - 32 mmol/L Anion gap 5 5 - 15 mmol/L Glucose 142 (H) 65 - 100 mg/dL BUN 34 (H) 6 - 20 MG/DL Creatinine 3.46 (H) 0.70 - 1.30 MG/DL  
 BUN/Creatinine ratio 10 (L) 12 - 20 GFR est AA 21 (L) >60 ml/min/1.73m2 GFR est non-AA 18 (L) >60 ml/min/1.73m2 Calcium 8.6 8.5 - 10.1 MG/DL Bilirubin, total 0.7 0.2 - 1.0 MG/DL  
 ALT (SGPT) 7 (L) 12 - 78 U/L  
 AST (SGOT) 14 (L) 15 - 37 U/L Alk. phosphatase 94 45 - 117 U/L Protein, total 6.3 (L) 6.4 - 8.2 g/dL Albumin 2.9 (L) 3.5 - 5.0 g/dL Globulin 3.4 2.0 - 4.0 g/dL A-G Ratio 0.9 (L) 1.1 - 2.2 MAGNESIUM Collection Time: 12/08/19  3:34 AM  
Result Value Ref Range Magnesium 2.3 1.6 - 2.4 mg/dL CBC W/O DIFF Collection Time: 12/08/19  3:34 AM  
Result Value Ref Range WBC 5.5 4.1 - 11.1 K/uL  
 RBC 2.68 (L) 4.10 - 5.70 M/uL HGB 8.0 (L) 12.1 - 17.0 g/dL HCT 26.9 (L) 36.6 - 50.3 % .4 (H) 80.0 - 99.0 FL  
 MCH 29.9 26.0 - 34.0 PG  
 MCHC 29.7 (L) 30.0 - 36.5 g/dL  
 RDW 18.4 (H) 11.5 - 14.5 % PLATELET 617 (L) 660 - 400 K/uL MPV 9.5 8.9 - 12.9 FL  
 NRBC 0.0 0  WBC ABSOLUTE NRBC 0.00 0.00 - 0.01 K/uL PROTHROMBIN TIME + INR Collection Time: 12/08/19  3:34 AM  
Result Value Ref Range INR 1.5 (H) 0.9 - 1.1 Prothrombin time 14.8 (H) 9.0 - 11.1 sec PTT  Collection Time: 12/08/19  3:34 AM  
 Result Value Ref Range aPTT 35.0 (H) 22.1 - 32.0 sec  
 aPTT, therapeutic range     58.0 - 77.0 SECS  
DIGOXIN Collection Time: 12/08/19  3:34 AM  
Result Value Ref Range Digoxin level 1.3 0.90 - 2.00 NG/ML  
LD Collection Time: 12/08/19  3:34 AM  
Result Value Ref Range  (H) 85 - 241 U/L  
NT-PRO BNP Collection Time: 12/08/19  3:34 AM  
Result Value Ref Range NT pro-BNP 20,696 (H) <125 PG/ML  
GLUCOSE, POC Collection Time: 12/08/19  7:19 AM  
Result Value Ref Range Glucose (POC) 137 (H) 65 - 100 mg/dL Performed by Shilpi Branch MD

## 2019-12-08 NOTE — PROGRESS NOTES
Physical Therapy 12/8/2019 Chart reviewed. Conversed with bedside nurse - Allowing patient to sleep till 10:30 prior to AM assessment. Will follow up later as able/appropriate. Thank you.  
Deepti Moran, PT, DPT

## 2019-12-08 NOTE — PROGRESS NOTES
Problem: Falls - Risk of 
Goal: *Absence of Falls Description Document Basilia Perera Fall Risk and appropriate interventions in the flowsheet. Outcome: Progressing Towards Goal 
Note: Fall Risk Interventions: 
Mobility Interventions: Communicate number of staff needed for ambulation/transfer Mentation Interventions: Adequate sleep, hydration, pain control, Evaluate medications/consider consulting pharmacy, Door open when patient unattended Medication Interventions: Evaluate medications/consider consulting pharmacy Elimination Interventions: Toileting schedule/hourly rounds History of Falls Interventions: Consult care management for discharge planning Problem: Pain Goal: *Control of Pain Outcome: Progressing Towards Goal 
Note: No current complaints of pain 
  
Problem: Pressure Injury - Risk of 
Goal: *Prevention of pressure injury Description Document Mich Scale and appropriate interventions in the flowsheet. Outcome: Progressing Towards Goal 
Note: Pressure Injury Interventions: 
Sensory Interventions: Assess need for specialty bed, Avoid rigorous massage over bony prominences, Chair cushion Moisture Interventions: Apply protective barrier, creams and emollients, Assess need for specialty bed, Check for incontinence Q2 hours and as needed Activity Interventions: Assess need for specialty bed, Chair cushion, Increase time out of bed Mobility Interventions: Chair cushion, Float heels, HOB 30 degrees or less Nutrition Interventions: Document food/fluid/supplement intake, Discuss nutritional consult with provider Friction and Shear Interventions: Feet elevated on foot rest, Foam dressings/transparent film/skin sealants, Lift sheet Problem: Infection - Risk of, Multi-drug Resistant Organism Colonization (MDRO) Goal: *Absence of MDRO colonization Outcome: Progressing Towards Goal 
Goal: *Absence of infection signs and symptoms Outcome: Progressing Towards Goal 
 Problem: Heart Failure: Discharge Outcomes Goal: *Demonstrates ability to perform prescribed activity without shortness of breath or discomfort Outcome: Progressing Towards Goal 
Goal: *Left ventricular function assessment completed prior to or during stay, or planned for post-discharge Outcome: Progressing Towards Goal 
Goal: *Verbalizes understanding and describes prescribed diet Outcome: Progressing Towards Goal 
Goal: *Verbalizes understanding/describes prescribed medications Outcome: Progressing Towards Goal 
  
Problem: Diabetes Self-Management Goal: *Disease process and treatment process Description Define diabetes and identify own type of diabetes; list 3 options for treating diabetes. Outcome: Progressing Towards Goal 
Goal: *Incorporating nutritional management into lifestyle Description Describe effect of type, amount and timing of food on blood glucose; list 3 methods for planning meals. Outcome: Progressing Towards Goal 
Goal: *Incorporating physical activity into lifestyle Description State effect of exercise on blood glucose levels. Outcome: Progressing Towards Goal 
Goal: *Developing strategies to promote health/change behavior Description Define the ABC's of diabetes; identify appropriate screenings, schedule and personal plan for screenings. Outcome: Progressing Towards Goal 
Goal: *Using medications safely Description State effect of diabetes medications on diabetes; name diabetes medication taking, action and side effects. Outcome: Progressing Towards Goal 
Goal: *Monitoring blood glucose, interpreting and using results Description Identify recommended blood glucose targets  and personal targets. Outcome: Progressing Towards Goal 
  
Problem: Discharge Planning Goal: *Discharge to safe environment Outcome: Progressing Towards Goal 
  
Problem: Nutrition Deficit Goal: *Optimize nutritional status Outcome: Progressing Towards Goal 
Note: TF at goal. Pt tolerating well Problem: Nutrition Deficit Goal: *Optimize nutritional status Outcome: Progressing Towards Goal 
  
Problem: Patient Education: Go to Patient Education Activity Goal: Patient/Family Education Outcome: Progressing Towards Goal 
  
Problem: LVAD Post-op Day 15 to Discharge Goal: Off Pathway (Use only if patient is Off Pathway) Outcome: Progressing Towards Goal 
Note:  
Pt appears less confused and more interactive. Closer observation still required Problem: Impaired Skin Integrity/Pressure Injury Treatment Goal: *Improvement of Existing Pressure Injury Outcome: Progressing Towards Goal 
  
Problem: Impaired Skin Integrity/Pressure Injury Treatment Goal: *Improvement of Existing Pressure Injury Outcome: Progressing Towards Goal 
  
Problem: Nutrition Deficit Goal: *Optimize nutritional status Outcome: Progressing Towards Goal

## 2019-12-08 NOTE — PROGRESS NOTES
Occupational Therapy 1011 -  
12/8/2019 
  
Chart reviewed. Conversed with PT - RN reporting they are allowing patient to sleep till 10:30 prior to AM assessment. Will follow up later as able/appropriate. Thank you. Dorothy Villatoro MS, OTR/L

## 2019-12-08 NOTE — PROGRESS NOTES
2000- Bedside and Verbal shift change report given to [address] (oncoming nurse) by MANJEET WILKES (offgoing nurse). Report included the following information SBAR, Procedure Summary, Intake/Output, Recent Results and Cardiac Rhythm Paced. 0000- Pt reassessed. No change. 0400- Pt reassessed. No change. Labs sent 
 
0800- Bedside and Verbal shift change report given to Vijaya RN/Wanda WILKES (oncoming nurse) by Megan Morales RN (offgoing nurse). Report included the following information SBAR, Procedure Summary, Intake/Output, Recent Results and Cardiac Rhythm Paced.

## 2019-12-08 NOTE — PROGRESS NOTES
0800: Bedside shift change report given to KATRIN Lilly and Martin Ayala RN (oncoming nurse) by Brenda Slater RN (offgoing nurse). Report included the following information SBAR, Intake/Output, MAR and Cardiac Rhythm paced 0830: Dr. Silvina Pereyra at bedside, updated by RN, plan of care discussed, orders received 1020: Shana, ANP with VAD team at bedside, updated by RN, orders received 1100: wife at bedside, updated by RN 
 
1300: LVAD dressing change completed with wife. Extensive education on sterile technique and dressing change steps reviewed with maximum assistance from RN 
 
2000: Bedside shift change report given to Brenda Slater RN (oncoming nurse) by Antoine Haynes RN and Martin Ayala RN (offgoing nurse). Report included the following information SBAR, Intake/Output, MAR and Cardiac Rhythm paced.

## 2019-12-09 NOTE — PROGRESS NOTES
NUTRITION Recommendations: 1. Change tube feeds to nocturnal feeds to encourage PO with meals: - Osmolite 1.5 @ 90ml/hr x 12hrs (7p to 7a) + 30ml flush q4hr during feeds 
 - adjust water flush per renal pending fluid status and oral intake 2. Encourage oral intake with full liquids - please make sure to thicken all liquids including Ensure shakes (2 pkts per shake) 3. Add imodium PRN with loose stools Diet: full liquids, nectar-thick liquids, NCS, 1500ml FR Supplements: none Tube feeds:  Osmolite 1.5 @ 25ml/hr advanced 15ml/hr q8hr to goal of 55ml/hr + 30ml flush q4hr Medications: allopurinol, retacrit, SSI, Bhakti-Q, keppra, mag ox, protonix, zosyn, pericolace, carafate, coumadin, milrinone drip; PRN: artifical saliva PRN, zofran Patient Vitals for the past 168 hrs: 
 % Diet Eaten 12/09/19 1300 100 % 12/08/19 1800 10 % 12/08/19 1300 50 % 12/08/19 1100 75 % 12/07/19 1800 100 % 12/07/19 1200 100 % 12/07/19 0800 75 % 12/06/19 1600 0 % 12/06/19 1200 0 % 12/06/19 0800 0 % 12/03/19 1118 75 % 12/03/19 0743 100 % Chart reviewed for tube feed check. DHT in place with nasal bridle. Tube feeds at goal and tolerating well. Osmolite 1.5 @ 55ml/hr + 30ml flush q4hr. Provides: 1320ml, 1980kcal, 83g protein, 1003ml free fluid + 180ml flush = 1183ml fluid. Meets 100% energy and protein needs. Seen by SLP and cleared for full liquids with nectar-thickener. Will add Ensure Enlive TID (1050kcal, 60g protein) - make sure to use 2 pkt of thickener for each shake. Appetite appears good/fair but recommend continuation of tube feeds fow now. Will adjust to nocturnal regimen to encourage PO with meals. oral supplements. Recommend: Osmolite 1.5 @ 90ml/hr x 12hrs (7p to 7a) + 30ml flush q4hr during feeds. Provides: 1080ml, 1620kcal, 68g protein. Meets 82% lower energy/protein needs. Some loose stools noted.  Bhakti-Q rx but would likely benefit from imodium PRN. Will continue to monitor PO intake with adjustment to tube feeds pending oral adequacy. See previous RD notes for additional details and recommendations. Estimated Nutrition Needs:  
Kcals/day: 1970 Kcals/day(1970-2134kcal) Protein: 98 g(81-98g (1-1.2g/kg - JUAN J on CKD with malnutrition)) Fluid: 1970 ml(1ml/kcal or per renal/cardio) Based On: Sharkey St Jeor(x 1.2-1.3) Weight Used: Actual wt(81.3kg) Marielle Gonzalez, RD [address] [place] , Pager #[phone]

## 2019-12-09 NOTE — PROGRESS NOTES
SLP Contact Note Attempted to see patient for SLP treatment. Pt currently with another service and therefore will defer for now. Thank you, You Flores M.Ed, CCC-SLP Speech-Language Pathologist

## 2019-12-09 NOTE — PROGRESS NOTES
2000 - I have reviewed and agree with the medications given, care rendered and documentation done by Memo Silveira RN.

## 2019-12-09 NOTE — PROGRESS NOTES
ID Progress Note 
2019 Subjective: He is looking better today than when I last saw him Objective: Antibiotics: 1. Levaquin 2. Rifampin 3. Fluconazole 4. Vancomycin 5. Cefazolin 6. Zosyn Vitals:  
Visit Vitals BP (!) 115/92 (BP 1 Location: Left arm, BP Patient Position: At rest) Pulse 75 Temp 99 °F (37.2 °C) Resp 22 Ht 6' 2\" (1.88 m) Wt 85.6 kg (188 lb 11.4 oz) SpO2 98% BMI 24.23 kg/m² Tmax:  Temp (24hrs), Av.8 °F (37.1 °C), Min:98.2 °F (36.8 °C), Max:99.1 °F (37.3 °C) Exam:  Lungs:  clear to auscultation bilaterally Heart:  regular rate and rhythm Abdomen:  soft, non-tender. Bowel sounds normal. No masses,  no organomegaly Urine is clearing up Labs:     
Recent Labs 19 
5807 19 
0334 19 
0401 WBC 5.2 5.5 5.1 HGB 7.9* 8.0* 8.4* * 146* 174 BUN 39* 34* 29* CREA 3.43* 3.46* 3.01* SGOT 15 14* 12* AP 94 94 97 TBILI 0.6 0.7 1.0 Cultures: No results found for: SDES Lab Results Component Value Date/Time Culture result: NO GROWTH 3 DAYS 2019 11:23 PM  
 Culture result: HEAVY ENTEROCOCCUS SPECIES NOTED (A) 2019 11:10 AM  
 Culture result: LIGHT YEAST (A) 2019 11:10 AM  
 
 
Radiology:  
 
Line/Insert Date:        
 
Assessment:  
 
1. UTI--Serratia (2 biotypes) from culture and small amount of Enterococcus 2. CHF/cardiomyopathy 3. ?aspiration event(s) 4. Renal insufficiency Objective: 1. Continue current therapy Reece Gonzalez MD

## 2019-12-09 NOTE — PROGRESS NOTES
2000- Bedside and Verbal shift change report given to [address] (oncoming nurse) by Jay Elias (offgoing nurse). Report included the following information SBAR, Procedure Summary, Intake/Output, Recent Results and Cardiac Rhythm Paced. 0000- Pt reassessed. No change 
 
0400- Pt reassessed. No change. Labs sent. 0800- Bedside and Verbal shift change report given to Jay Elias (oncoming nurse) by Rodger Johnson RN (offgoing nurse). Report included the following information SBAR, Procedure Summary, Intake/Output, Recent Results and Cardiac Rhythm Paced.

## 2019-12-09 NOTE — PROGRESS NOTES
[facility] 
 [address] [facility], [address] [facility] Phone: [phone]   [phone]   
  
Nephrology Progress Note Sona Kiser     1950     [id number] Date of Admission : 10/25/2019 
12/09/19 CC:  Follow up for JUAN J, CKD, Hyponatremia Assessment and Plan JUAN J on CKD 
- likely 2/2 Over diuresis w/ Bumex gtt earlier in the week, could possibly suffered ATN  From Hemodynamic/ perfusional issues, sepsis - Cr stable and voiding more 
- diurese PRN today 
- no urgent need for RRT at this time 
- daily labs and strict I/Os Coagulopathy : 
- No active bleeding: watch H/H Hypokalemia  
- replete PRN Hypernatremia : 
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  
- unable to get CTA/MRA 
- per neuro - On Keppra 
  
Gross hematuria, BPH w/ retention: 
- off CBI pre VAD. cysto pre op showed catheter related Cystitis @ postr wall  
- neal had to be replaced due to urinary retention 
- flomax - on hold  
- keep neal for now until he is stable from CHF stand point Acute on Chronic HFrEF  
- EF 16-20%, NYHA Class IV , hx of VF arrest - s/p AICD 
- Impella insertion 10/29- removed 11/18  
- s/p LVAD placement on 11/18 Hoarseness, vocal cord paralysis, mediastinal adenopathy: 
- will need eventual PET/CT 
  
L arm clot s/p thrombectomy on 11/25 JOSE on CPAP  
  
PAF s/p DCCV 6/19 
  
Anemia: 
- from blood loss s/p multiple blood products - s/p IV iron,  Repeat iron studies ok 
- on PAVEL q7 d Serratia and Enteroccocus UTI: 
- completed abx Above d/w the pt's RN Interval History:   
Seen and examined. Still confused but overall improving according to RN. CVP 6, swan readings better. Cr stable, voiding more w/ bumex yesterday. Review of Systems: UNABLE TO obtain ROS Current Medications:  
Current Facility-Administered Medications Medication Dose Route Frequency  piperacillin-tazobactam (ZOSYN) 3.375 g in 0.9% sodium chloride (MBP/ADV) 100 mL  3.375 g IntraVENous Q12H  aspirin chewable tablet 81 mg  81 mg Oral DAILY  levETIRAcetam (KEPPRA) 250 mg in 0.9% sodium chloride 100 mL IVPB  250 mg IntraVENous Q12H  
 0.9% sodium chloride infusion  6 mL/hr IntraVENous CONTINUOUS  
 albuterol-ipratropium (DUO-NEB) 2.5 MG-0.5 MG/3 ML  3 mL Nebulization Q4H RT  
 allopurinol (ZYLOPRIM) tablet 100 mg  100 mg Oral DAILY  sildenafil (REVATIO) 2 mg/mL oral suspension 20 mg  20 mg Oral Q8H  
 pantoprazole (PROTONIX) 40 mg in 0.9% sodium chloride 10 mL injection  40 mg IntraVENous DAILY  arformoterol (BROVANA) neb solution 15 mcg  15 mcg Nebulization BID RT And  
 budesonide (PULMICORT) 500 mcg/2 ml nebulizer suspension  500 mcg Nebulization BID RT  
 milrinone (PRIMACOR) 20 MG/100 ML D5W infusion  0.1 mcg/kg/min IntraVENous CONTINUOUS  
 magnesium oxide (MAG-OX) tablet 400 mg  400 mg Oral DAILY  artificial saliva (MOUTH KOTE) 1 Spray  1 Spray Oral PRN  
 lactobac ac& pc-s.therm-b.anim (TIAN Q/RISAQUAD)  1 Cap Oral DAILY  oxyCODONE IR (ROXICODONE) tablet 5 mg  5 mg Oral Q6H PRN  
 balsam peru-castor oil (VENELEX) ointment   Topical TID  [Held by provider] tamsulosin (FLOMAX) capsule 0.4 mg  0.4 mg Oral DAILY  insulin lispro (HUMALOG) injection   SubCUTAneous AC&HS  Warfarin MD/NP dosing   Other PRN  
 epoetin yvonne-epbx (RETACRIT) injection 20,000 Units  20,000 Units SubCUTAneous Q7D  
 [Held by provider] lidocaine (LIDODERM) 5 % patch 1 Patch  1 Patch TransDERmal Q24H  
 sodium chloride (NS) flush 5-40 mL  5-40 mL IntraVENous Q8H  
 sodium chloride (NS) flush 5-40 mL  5-40 mL IntraVENous PRN  
 acetaminophen (TYLENOL) tablet 650 mg  650 mg Oral Q6H PRN  
 naloxone (NARCAN) injection 0.4 mg  0.4 mg IntraVENous PRN  
 ondansetron (ZOFRAN) injection 4 mg  4 mg IntraVENous Q4H PRN  
  [Held by provider] senna-docusate (PERICOLACE) 8.6-50 mg per tablet 1 Tab  1 Tab Oral DAILY  [Held by provider] polyethylene glycol (MIRALAX) packet 17 g  17 g Oral DAILY  bisacodyl (DULCOLAX) tablet 5 mg  5 mg Oral DAILY PRN  
 [Held by provider] sucralfate (CARAFATE) tablet 1 g  1 g Oral AC&HS  influenza vaccine 2019-20 (6 mos+)(PF) (FLUARIX/FLULAVAL/FLUZONE QUAD) injection 0.5 mL  0.5 mL IntraMUSCular PRIOR TO DISCHARGE  hydrALAZINE (APRESOLINE) 20 mg/mL injection 20 mg  20 mg IntraVENous Q6H PRN  
 dextrose 10 % infusion 125-250 mL  125-250 mL IntraVENous PRN No Known Allergies Objective: 
Vitals:   
Vitals:  
 12/09/19 0700 12/09/19 0717 12/09/19 0749 12/09/19 0800 BP:      
Pulse: 74   80 Resp: 22   24 Temp:    99 °F (37.2 °C) SpO2: 98%  100% 96% Weight:  85.6 kg (188 lb 11.4 oz) Height:      
 
Intake and Output: 
12/09 0701 - 12/09 1900 In: -  
Out: 20 [Urine:20] 
12/07 1901 - 12/09 0700 In: 5533.4 [P.O.:700; I.V.:1678.4] Out: 1975 [WVYHN:0171] Physical Examination: 
 
General: In bed, restless legs Neck:  Lines + Resp:  Decreased bibasilar breath sounds; no resp distress CV:  VAD sounds; no LE edema GI:  Soft and non-tender; no distension Neurologic:  Sleeping :  + neal; clear urine [x]    High complexity decision making was performed 
[x]    Patient is at high-risk of decompensation with multiple organ involvement Lab Data Personally Reviewed: I have reviewed all the pertinent labs, microbiology data and radiology studies during assessment. Recent Labs 12/09/19 
4628 12/08/19 
0334 12/07/19 
0401  147* 147* K 4.2 3.9 3.2*  
* 112* 111* CO2 29 30 30 * 142* 145* BUN 39* 34* 29* CREA 3.43* 3.46* 3.01* CA 9.0 8.6 9.4 MG 2.2 2.3 2.6* ALB 2.6* 2.9* 3.1*  
SGOT 15 14* 12* ALT 7* 7* 9* INR 1.4* 1.5* 5.0* Recent Labs 12/09/19 
3553 12/08/19 
0334 12/07/19 
0401 WBC 5.2 5.5 5.1 HGB 7.9* 8.0* 8.4* HCT 26.9* 26.9* 28.3*  
* 146* 174 No results found for: SDES Lab Results Component Value Date/Time Culture result: NO GROWTH 3 DAYS 12/06/2019 11:23 PM  
 Culture result: HEAVY ENTEROCOCCUS SPECIES NOTED (A) 11/27/2019 11:10 AM  
 Culture result: LIGHT YEAST (A) 11/27/2019 11:10 AM  
 Culture result: NO GROWTH 5 DAYS 11/12/2019 11:18 AM  
 Culture result: SERRATIA MARCESCENS (A) 10/31/2019 10:05 AM  
 Culture result: SERRATIA MARCESCENS (2ND COLONY TYPE/STRAIN) (A) 10/31/2019 10:05 AM  
 Culture result: ENTEROCOCCUS FAECALIS GROUP D (A) 10/31/2019 10:05 AM  
 
Recent Results (from the past 24 hour(s)) EKG, 12 LEAD, INITIAL Collection Time: 12/08/19 10:55 AM  
Result Value Ref Range Ventricular Rate 76 BPM  
 Atrial Rate 80 BPM  
 QRS Duration 102 ms Q-T Interval 432 ms QTC Calculation (Bezet) 486 ms Calculated R Axis -34 degrees Calculated T Axis 20 degrees Diagnosis    
  severe artifacts ** Poor data quality, interpretation may be adversely affected 
possible ventricular pacing and underlying atrial fibrillation When compared with ECG of 05-DEC-2019 10:15, 
Vent. rate has decreased BY  16 BPM 
need to repeat ECG Confirmed by Edita Castillo MD, [facility] ([id number]) on 12/8/2019 2:32:37 PM 
  
GLUCOSE, POC Collection Time: 12/08/19 12:47 PM  
Result Value Ref Range Glucose (POC) 130 (H) 65 - 100 mg/dL Performed by Carter Oneal GLUCOSE, POC Collection Time: 12/08/19  5:39 PM  
Result Value Ref Range Glucose (POC) 107 (H) 65 - 100 mg/dL Performed by Carter Oneal GLUCOSE, POC Collection Time: 12/08/19  9:10 PM  
Result Value Ref Range Glucose (POC) 116 (H) 65 - 100 mg/dL Performed by Demetrio [facility] METABOLIC PANEL, COMPREHENSIVE Collection Time: 12/09/19  4:38 AM  
Result Value Ref Range Sodium 143 136 - 145 mmol/L Potassium 4.2 3.5 - 5.1 mmol/L  Chloride 110 (H) 97 - 108 mmol/L  
 CO2 29 21 - 32 mmol/L  
 Anion gap 4 (L) 5 - 15 mmol/L Glucose 120 (H) 65 - 100 mg/dL BUN 39 (H) 6 - 20 MG/DL Creatinine 3.43 (H) 0.70 - 1.30 MG/DL  
 BUN/Creatinine ratio 11 (L) 12 - 20 GFR est AA 22 (L) >60 ml/min/1.73m2 GFR est non-AA 18 (L) >60 ml/min/1.73m2 Calcium 9.0 8.5 - 10.1 MG/DL Bilirubin, total 0.6 0.2 - 1.0 MG/DL  
 ALT (SGPT) 7 (L) 12 - 78 U/L  
 AST (SGOT) 15 15 - 37 U/L Alk. phosphatase 94 45 - 117 U/L Protein, total 6.2 (L) 6.4 - 8.2 g/dL Albumin 2.6 (L) 3.5 - 5.0 g/dL Globulin 3.6 2.0 - 4.0 g/dL A-G Ratio 0.7 (L) 1.1 - 2.2 MAGNESIUM Collection Time: 12/09/19  4:38 AM  
Result Value Ref Range Magnesium 2.2 1.6 - 2.4 mg/dL CBC W/O DIFF Collection Time: 12/09/19  4:38 AM  
Result Value Ref Range WBC 5.2 4.1 - 11.1 K/uL  
 RBC 2.67 (L) 4.10 - 5.70 M/uL HGB 7.9 (L) 12.1 - 17.0 g/dL HCT 26.9 (L) 36.6 - 50.3 % .7 (H) 80.0 - 99.0 FL  
 MCH 29.6 26.0 - 34.0 PG  
 MCHC 29.4 (L) 30.0 - 36.5 g/dL  
 RDW 18.2 (H) 11.5 - 14.5 % PLATELET 283 (L) 073 - 400 K/uL MPV 9.4 8.9 - 12.9 FL  
 NRBC 0.0 0  WBC ABSOLUTE NRBC 0.00 0.00 - 0.01 K/uL PROTHROMBIN TIME + INR Collection Time: 12/09/19  4:38 AM  
Result Value Ref Range INR 1.4 (H) 0.9 - 1.1 Prothrombin time 13.5 (H) 9.0 - 11.1 sec PTT Collection Time: 12/09/19  4:38 AM  
Result Value Ref Range aPTT 31.9 22.1 - 32.0 sec  
 aPTT, therapeutic range     58.0 - 77.0 SECS  
DIGOXIN Collection Time: 12/09/19  4:38 AM  
Result Value Ref Range Digoxin level 1.3 0.90 - 2.00 NG/ML  
LACTIC ACID Collection Time: 12/09/19  4:38 AM  
Result Value Ref Range Lactic acid 1.1 0.4 - 2.0 MMOL/L  
LD Collection Time: 12/09/19  4:38 AM  
Result Value Ref Range  (H) 85 - 241 U/L  
NT-PRO BNP Collection Time: 12/09/19  4:38 AM  
Result Value Ref Range NT pro-BNP 19,757 (H) <125 PG/ML PREALBUMIN Collection Time: 12/09/19  4:38 AM  
Result Value Ref Range Prealbumin 10.1 (L) 20.0 - 40.0 mg/dL POC VENOUS BLOOD GAS Collection Time: 12/09/19  5:06 AM  
Result Value Ref Range Device: NASAL CANNULA Flow rate (POC) 4 L/M  
 pH, venous (POC) 7.374 7.32 - 7.42    
 pCO2, venous (POC) 47.9 41 - 51 MMHG  
 pO2, venous (POC) 36 25 - 40 mmHg HCO3, venous (POC) 27.9 23.0 - 28.0 MMOL/L  
 sO2, venous (POC) 67 65 - 88 % Base excess, venous (POC) 3 mmol/L Allens test (POC) N/A Total resp. rate 22 Site [facility] Specimen type (POC) MIXED VENOUS    
GLUCOSE, POC Collection Time: 12/09/19  7:46 AM  
Result Value Ref Range Glucose (POC) 120 (H) 65 - 100 mg/dL Performed by Walker Chapa MD

## 2019-12-09 NOTE — PROGRESS NOTES
Problem: Falls - Risk of 
Goal: *Absence of Falls Description Document Catherene Bunting Fall Risk and appropriate interventions in the flowsheet. Outcome: Progressing Towards Goal 
Note: Fall Risk Interventions: 
Mobility Interventions: Communicate number of staff needed for ambulation/transfer Mentation Interventions: Adequate sleep, hydration, pain control, Door open when patient unattended, More frequent rounding Medication Interventions: Evaluate medications/consider consulting pharmacy Elimination Interventions: Toileting schedule/hourly rounds History of Falls Interventions: Consult care management for discharge planning Problem: Pain Goal: *Control of Pain Outcome: Progressing Towards Goal 
Note: No complaints of pain 
  
Problem: Pressure Injury - Risk of 
Goal: *Prevention of pressure injury Description Document Mich Scale and appropriate interventions in the flowsheet. Outcome: Progressing Towards Goal 
Note: Pressure Injury Interventions: 
Sensory Interventions: Assess changes in LOC, Check visual cues for pain, Keep linens dry and wrinkle-free, Minimize linen layers, Monitor skin under medical devices, Turn and reposition approx. every two hours (pillows and wedges if needed) Moisture Interventions: Absorbent underpads, Check for incontinence Q2 hours and as needed, Internal/External urinary devices, Minimize layers, Maintain skin hydration (lotion/cream) Activity Interventions: Pressure redistribution bed/mattress(bed type) Mobility Interventions: Assess need for specialty bed, HOB 30 degrees or less, Pressure redistribution bed/mattress (bed type), Turn and reposition approx. every two hours(pillow and wedges) Nutrition Interventions: Document food/fluid/supplement intake Friction and Shear Interventions: Apply protective barrier, creams and emollients, HOB 30 degrees or less, Lift sheet, Minimize layers Problem: Infection - Risk of, Multi-drug Resistant Organism Colonization (MDRO) Goal: *Absence of MDRO colonization Outcome: Progressing Towards Goal 
Goal: *Absence of infection signs and symptoms Outcome: Progressing Towards Goal 
  
Problem: Heart Failure: Discharge Outcomes Goal: *Demonstrates ability to perform prescribed activity without shortness of breath or discomfort Outcome: Progressing Towards Goal 
Goal: *Left ventricular function assessment completed prior to or during stay, or planned for post-discharge Outcome: Progressing Towards Goal 
  
Problem: Diabetes Self-Management Goal: *Disease process and treatment process Description Define diabetes and identify own type of diabetes; list 3 options for treating diabetes. Outcome: Progressing Towards Goal 
Goal: *Incorporating nutritional management into lifestyle Description Describe effect of type, amount and timing of food on blood glucose; list 3 methods for planning meals. Outcome: Progressing Towards Goal 
Goal: *Incorporating physical activity into lifestyle Description State effect of exercise on blood glucose levels. Outcome: Progressing Towards Goal 
Goal: *Developing strategies to promote health/change behavior Description Define the ABC's of diabetes; identify appropriate screenings, schedule and personal plan for screenings. Outcome: Progressing Towards Goal 
Goal: *Monitoring blood glucose, interpreting and using results Description Identify recommended blood glucose targets  and personal targets. Outcome: Progressing Towards Goal 
  
Problem: Nutrition Deficit Goal: *Optimize nutritional status Outcome: Progressing Towards Goal 
Note:  
TF at goal. Pt tolerating Problem: Nutrition Deficit Goal: *Optimize nutritional status Outcome: Progressing Towards Goal 
  
Problem: LVAD Post-op Day 15 to Discharge Goal: Off Pathway (Use only if patient is Off Pathway) Outcome: Progressing Towards Goal 
Note: Pt continues to need ICU level care. Slow to progress at this time. Under close observation Problem: Impaired Skin Integrity/Pressure Injury Treatment Goal: *Improvement of Existing Pressure Injury Outcome: Progressing Towards Goal 
  
Problem: Impaired Skin Integrity/Pressure Injury Treatment Goal: *Improvement of Existing Pressure Injury Outcome: Progressing Towards Goal 
  
Problem: Nutrition Deficit Goal: *Optimize nutritional status Outcome: Progressing Towards Goal

## 2019-12-09 NOTE — PROGRESS NOTES
Problem: Mobility Impaired (Adult and Pediatric) Goal: *Acute Goals and Plan of Care (Insert Text) Description FUNCTIONAL STATUS PRIOR TO ADMISSION: Patient was modified independent using a single point cane for functional mobility. Patient reports an increasingly sedentary lifestyle 2* fatigue and SOB/dyspnea. Retired (so is his wife). Patient reports x 3 falls within the last couple of weeks. Patient is wearing either nasal cannula or CPAP at night. LVAD work-up has been initiated. HOME SUPPORT PRIOR TO ADMISSION: The patient lived with his wife, but did not require physical assistance. Physical Therapy Goals: 
 
Re-evaluation completed 11/20/2019 and new goals formulated following LVAD implantation. Reviewed and cont 12/3/2019 1. Patient will move from supine to sit and sit to supine, scoot up and down and roll side to side in bed with minimal assistance/contact guard assist within 7 days. 2.  Patient will perform sit to/from stand with minimal assistance/contact guard assist within 7 days. 3.  Patient will ambulate 150 feet with least restrictive assistive device and minimal assistance/contact guard assist within 7 days. 4.  Patient will ascend/descend 4 stairs with handrail(s) with minimal assistance/contact guard assist within 7 days. 5.  Patient will perform cardiac exercises per protocol with supervision within 7 days. 6.  Patient will verbally and functionally recall 3/3 sternal precautions within 7 days. 7.  Patient will perform power exchange for power module to/from battery with moderate assistance  within 7 days. Initiated 10/27/2019; updated 11/15/2019 1. Patient will move from supine to sit and sit to supine, scoot up and down and roll side to side in bed with independence within 7 days. 2.  Patient will perform sit to/from stand with supervision/set-up within 7 days.  
3.  Patient will ambulate 200 feet with least restrictive assistive device and supervision/set-up within 7 days. 4.  Patient will ascend/descend 4 stairs with  handrail(s) with supervision/set-up within 7 days for functional strengthening and community reintegration. Collin Villarreal 5.  Patient will verbally and functionally recall 3/3 sternal precautions within 7 days in preparation for LVAD implantation. 6.  Patient will perform a mock power exchange for power module to/from battery with supervision/set-up within 7 days in preparation for LVAD implantation. 1.  Patient will move from supine to sit and sit to supine, scoot up and down and roll side to side in bed with independence within 7 days. 2.  Patient will perform sit to/from stand with supervision/set-up within 7 days. 3.  Patient will ambulate 150 feet with least restrictive assistive device and supervision/set-up within 7 days. 4.  Patient will ascend/descend 4 stairs with  handrail(s) with supervision/set-up within 7 days for functional strengthening and community reintegration. Collin Villarreal 5.  Patient will verbally and functionally recall 3/3 sternal precautions within 7 days in preparation for LVAD implantation. 6.  Patient will perform a mock power exchange for power module to/from battery with supervision/set-up within 7 days in preparation for LVAD implantation. Outcome: Progressing Towards Goal 
 
PHYSICAL THERAPY TREATMENT Patient: Rebecca Frost (75 y.o. male) Date: 12/9/2019 Diagnosis: Acute decompensated heart failure ([facility] [I50.9] <principal problem not specified> Procedure(s) (LRB): LEFT BRACHIAL THROMBECTOMY (Left) 14 Days Post-Op Precautions: Fall, Sternal, No chest compressions Chart, physical therapy assessment, plan of care and goals were reviewed. ASSESSMENT Patient continues with skilled PT services and is progressing towards goals.  Patient participated in occular-motor exercises, LE target practice in all planes (seated), and standing tolerance progressed to 2 minutes (noted very poor visual proprioception, poor gross coordination of LEs, and scissoring/ataxia with stand marches and side-stepping). Still deemed unsafe to transfer to chair. Current Level of Function Impacting Discharge (mobility/balance): A x 2 secondary to patient's impulsivity and ataxia. Other factors to consider for discharge: Confusion (needing a sitter), complex hospital admission, new onset of cranial nerve 3 palsy. PLAN : 
Patient continues to benefit from skilled intervention to address the above impairments. Continue treatment per established plan of care. to address goals. Recommendation for discharge: (in order for the patient to meet his/her long term goals) Therapy 3 hours per day 5-7 days per week This discharge recommendation: 
Has been made in collaboration with the attending provider and/or case management IF patient discharges home will need the following DME: to be determined (TBD) SUBJECTIVE:  
Patient stated I feel stronger today.  OBJECTIVE DATA SUMMARY:  
Critical Behavior: 
Neurologic State: Eyes open to voice Orientation Level: Oriented to person, Oriented to place, Oriented to situation, Disoriented to time Cognition: Decreased command following, Poor safety awareness Safety/Judgement: Decreased awareness of environment Functional Mobility Training: 
Bed Mobility: 
  
Supine to Sit: Moderate assistance(Poor coordination + visio-proprioception) Transfers: 
Sit to Stand: Moderate assistance Stand to Sit: Moderate assistance Balance: 
Sitting: Impaired Sitting - Static: Fair (occasional) Sitting - Dynamic: Fair (occasional)(Posterior trunk lean while donning socks) Standing: Impaired; With support Standing - Static: Fair Standing - Dynamic : Poor(Poor spatial awareness/visual proprioception) Activity Tolerance:  
Fair Please refer to the flowsheet for vital signs taken during this treatment. After treatment patient left in no apparent distress:  
Supine in bed, Call bell within reach, and Side rails x 3 
 
COMMUNICATION/COLLABORATION:  
The patients plan of care was discussed with: Occupational Therapist, Registered Nurse, Physician, and  Karen Watson PT, DPT Time Calculation: 28 mins

## 2019-12-09 NOTE — PROGRESS NOTES
Problem: Self Care Deficits Care Plan (Adult) Goal: *Acute Goals and Plan of Care (Insert Text) Description FUNCTIONAL STATUS PRIOR TO ADMISSION: Patient was modified independent using a single point cane for functional mobility. Patient able to shower and dress himself. However, patient required assistance for household management from his wife. HOME SUPPORT: The patient lived alone with wife to provide assistance. Occupational Therapy Goals: OT weekly reassessment 12/6/19: goals modified 1. Patient will perform upper body dressing moderate assistance within 7 day(s). 2.  Patient will perform lower body dressing with moderate assistance within 7 day(s). 3.  Patient will perform grooming seated EOB with minimum assistance within 7 day(s). 4.  Patient will perform toilet transfers with minimum assistance within 7 day(s). 5.  Patient will perform all aspects of toileting with moderate assistance within 7 day(s). 6.  Patient will participate in upper extremity therapeutic exercise/activities with supervision/set-up-min A for 5 minutes within 7 day(s). 7.  Patient will utilize energy conservation techniques during functional activities with verbal cues within 7 day(s). 8. Patient will verbalize 3/5 LVAD components with min verbal cues within 7 day (s). OT weekly reassessment 11/29/19: goals modified below 1. Patient will perform upper body dressing including LVAD switchovers with moderate assistance within 7 day(s). 2.  Patient will perform lower body dressing with minimal assistance within 7 day(s). 3.  Patient will perform grooming in standing with minimum assistance within 7 day(s). 4.  Patient will perform toilet transfers with minimum assistance within 7 day(s). 5.  Patient will perform all aspects of toileting with minimal assistance within 7 day(s).  
6.  Patient will participate in upper extremity therapeutic exercise/activities with supervision/set-up for 5 minutes within 7 day(s). 7.  Patient will utilize energy conservation techniques during functional activities with verbal cues within 7 day(s). 8. Patient will verbalize LVAD terminology with verbal cues within 7 day (s). Goals reviewed and continued/modified as follows 11/20/19 s/p LVAD implantation 1. Patient will perform upper body dressing including LVAD switchovers with moderate assistance within 7 day(s). 2.  Patient will perform lower body dressing with minimal assistance within 7 day(s). 3.  Patient will perform grooming with supervision/setup within 7 day(s). 4.  Patient will perform toilet transfers supervision/setupmodified independence within 7 day(s). 5.  Patient will perform all aspects of toileting with minimal assistance within 7 day(s). 6.  Patient will participate in upper extremity therapeutic exercise/activities with supervision/set-up for 5 minutes within 7 day(s). 7.  Patient will utilize energy conservation techniques during functional activities with verbal cues within 7 day(s). 8. Patient will verbalize LVAD terminology with verbal cues within 7 day (s). Goals reviewed and continued/modified as follows 11/12/19 1. Patient will perform bathing with supervision/set-up within 7 day(s). 2.  Patient will perform lower body dressing with supervision/set-up within 7 day(s). 3.  Patient will perform grooming with modified independence within 7 day(s). 4.  Patient will perform toilet transfers with modified independence within 7 day(s). 5.  Patient will perform all aspects of toileting with supervision/set-up within 7 day(s). 6.  Patient will participate in upper extremity therapeutic exercise/activities with supervision/set-up for 5 minutes within 7 day(s). 7.  Patient will utilize energy conservation techniques during functional activities with verbal cues within 7 day(s). 8. Patient will verbalize LVAD terminology with verbal cues within 7 day (s) in preparation for implant. Continue all goals 10/30/19 post re-eval for Impella removal  
Initiated 10/28/2019 1. Patient will perform bathing with supervision/set-up within 7 day(s). 2.  Patient will perform lower body dressing with supervision/set-up within 7 day(s). 3.  Patient will perform grooming with modified independence within 7 day(s). 4.  Patient will perform toilet transfers with modified independence within 7 day(s). 5.  Patient will perform all aspects of toileting with supervision/set-up within 7 day(s). 6.  Patient will participate in upper extremity therapeutic exercise/activities with supervision/set-up for 5 minutes within 7 day(s). 7.  Patient will utilize energy conservation techniques during functional activities with verbal cues within 7 day(s). Outcome: Progressing Towards Goal 
 OCCUPATIONAL THERAPY TREATMENT Patient: Tammie Vidal (75 y.o. male) Date: 12/9/2019 Diagnosis: Acute decompensated heart failure ([facility] [I50.9] <principal problem not specified> Procedure(s) (LRB): LEFT BRACHIAL THROMBECTOMY (Left) 14 Days Post-Op Precautions: Fall, Sternal 
Chart, occupational therapy assessment, plan of care, and goals were reviewed. ASSESSMENT Patient continues with skilled OT services and is progressing towards goals. Patient continues to present with diplopia, generalized weakness, impaired proprioception, impaired balance, BUE tremors (RUE>LUE today), impaired visual attention/visual perception, decreased attention, difficulty following complex commands, and with impulsivity. Patient required largely MOD A for attempts at sit > stand transfers today with slight R lateral lean requiring verbal and visual cues to maintain upright standing posture.  Patient completed oculomotor exercises, sitting balance activities, and donned eye patch for standing balance activities today for increased safety. Patient also completed self-feeding x MIN A due to cues required for visual perception. Continue to recommend IPR to maximize patient safety and independence with ADL transfers and tasks. Current Level of Function Impacting Discharge (ADLs): MOD A LB ADLs Other factors to consider for discharge: safety awareness; insight PLAN : 
Patient continues to benefit from skilled intervention to address the above impairments. Continue treatment per established plan of care. to address goals. Recommend with staff: positioning; BUE cardiac exercises in bed Recommend next OT session: LB ADL training Recommendation for discharge: (in order for the patient to meet his/her long term goals) Therapy 3 hours per day 5-7 days per week This discharge recommendation: 
Has been made in collaboration with the attending provider and/or case management IF patient discharges home will need the following DME: to be determined (TBD) SUBJECTIVE:  
Patient stated Destinee Arboleda cami ramos.  OBJECTIVE DATA SUMMARY:  
Cognitive/Behavioral Status: 
Neurologic State: Alert Orientation Level: Oriented to person;Oriented to place;Oriented to situation;Disoriented to time Cognition: Decreased attention/concentration;Decreased command following Perception: Appears intact Perseveration: No perseveration noted Safety/Judgement: Decreased insight into deficits; Decreased awareness of need for safety Functional Mobility and Transfers for ADLs: 
Bed Mobility: 
Supine to Sit: Moderate assistance(Poor coordination + visio-proprioception) Transfers: 
Sit to Stand: Moderate assistance Balance: 
Sitting: Impaired Sitting - Static: Fair (occasional) Sitting - Dynamic: Fair (occasional)(Posterior trunk lean while donning socks) Standing: Impaired; With support Standing - Static: Fair Standing - Dynamic : Poor(Poor spatial awareness/visual proprioception) ADL Intervention: 
Feeding Feeding Assistance: Minimum assistance Cognitive Retraining Safety/Judgement: Decreased insight into deficits; Decreased awareness of need for safety Activity Tolerance:  
Fair Please refer to the flowsheet for vital signs taken during this treatment. After treatment patient left in no apparent distress:  
Supine in bed and Call bell within reach COMMUNICATION/COLLABORATION:  
The patients plan of care was discussed with: Physical Therapist and Registered Nurse Dawit Anderson Time Calculation: 38 mins

## 2019-12-09 NOTE — DIABETES MGMT
[facility] Cardiac Surgery Note Follow Up Recommendations/ Comments: Consult received for medication recommendations. BG's in range and has required no lispro correction Pt is s/p LVAD placement. Had brachial thrombectomy 11/26/19. Pt returned to CVICU 12/4/2019 due to respiratory distress. SLP and nutrition consults appreciated Noted TF Osmolite 1.5 @ 55 . Plant o change to nocturnal TF Osmolite @ 90 7PM - 7AM 
 
Observe BG response to TF. If results are > 180 mg/dL become persistently, may require NPH at onset of TF Current hospital DM medication: lispro correction scale, normal sensitivity Chart reviewed and initial evaluation complete on Sona Kiser. Patient is a 71 y.o. male with no prior hx. Recent A1c suggestive of new dx. DTC saw pt for education regarding new diagnosis on 11/27/2019. May need review closer to discharge A1c:  
Lab Results Component Value Date/Time Hemoglobin A1c 6.6 (H) 10/25/2019 02:56 PM  
              
Recent Glucose Results:  
Lab Results Component Value Date/Time  (H) 12/09/2019 04:38 AM  
 GLUCPOC 124 (H) 12/09/2019 12:27 PM  
 GLUCPOC 120 (H) 12/09/2019 07:46 AM  
 GLUCPOC 116 (H) 12/08/2019 09:10 PM  
  
 
Lab Results Component Value Date/Time Creatinine 3.43 (H) 12/09/2019 04:38 AM  
 
Estimated Creatinine Clearance: 23.6 mL/min (A) (based on SCr of 3.43 mg/dL (H)). Active Orders Diet DIET FULL LIQUID 2 [place]/2 Mildly Thick; No Conc. Sweets PO intake:  
Patient Vitals for the past 72 hrs: 
 % Diet Eaten 12/09/19 1300 100 % 12/08/19 1800 10 % 12/08/19 1300 50 % 12/08/19 1100 75 % 12/07/19 1800 100 % 12/07/19 1200 100 % 12/07/19 0800 75 % Will continue to follow as needed. Thank you. Raul Oliver RN, CDE [facility] Time spent: 4 minutes

## 2019-12-09 NOTE — PROGRESS NOTES
1230 Bedside shift change report given to Alcira Reis (oncoming nurse) by Ela Oliva (offgoing nurse). Report included the following information SBAR, MAR and Cardiac Rhythm paced . 2000 [facility] NP updated. Milrinone discontinued. And orders received to discontinue swan and mac.  
 
1400 LVAD dressing change education done with Pt's wife. Patient has good recollection of steps in dressing change but has great difficulty with maintaining sterile field and is feeling very frustrated with the drsg change process. [address] discontinued. 1445 MAP 94. Per Hydralazine order do not give unless MAP >100 
  
1600 MAP 84 
 
1830 MAC discontinued. 2000 Bedside shift change report given to Domenica Garrett (oncoming nurse) by Alcira Reis (offgoing nurse). Report included the following information SBAR, MAR and Cardiac Rhythm Paced.

## 2019-12-09 NOTE — PROGRESS NOTES
[address] [facility] in [place], [place] Inpatient Progress Note Patient name: Arianna Wynne Patient : 1950 Patient MRN: [id number] Attending MD: Ann Marie Santos MD 
Date of service: 19 Chief Complaint:  
Juliocesar Flores azucena LVAD implant 
  
HPI: Sona Kiser is a 71y.o. year old pleasant white male with a history of HTN, HLD, JOSE, CAD s/p cardiac arrest VFib s/p CABG () c/b sternal would infection and sternectomy, ischemic cardiomyopathy LVEF 15-20%, s/p ICD and with LBBB. He was admitted with acute on chronic systolic heart failure with massive volume overload > 20 lbs, in the setting of atrial fibrillation s/p failed DCCV and underwent DCCV and RHC on .  S/p BiVICD on 19 with Dr. Simran Obando. He was discharged home home on IV milrinone on 19. Kennedy Gilbert has been followed closely by Dr. Mima Garcia and the [facility]. 
  
Mr. Kiser was admitted for acute on chronic systolic heart failure. He underwent implantation of Impella 5.0 due to CS and has completed his LVAD evaluation. Kennedy Gilbert meets criteria for implant of HM3 as DT. He was NYHA Class IV prior to implant of Impella 5.0, has LVEF < 15%, was intolerant of GDMT due to symptomatic hypotension and renal dysfunction. He remains dependent on temporary MCS support. RHC  revealed compensated hemodynamics on Impella. His renal function has improved dramatically with improvement in his hemodynamics. He is not a suitable transplant candidate due to single kidney, sternectomy, debility, and frailty. He was reviewed by Ginna Kramer and felt to be a high risk heart transplant candidate due to multiple co-morbidities as well as the afore mentioned conditions.  He remained in the CCU and underwent a HM 3 implant as DT on . He was weaned off of pressors and transferred to [place] [address] where he continues to undergo PT/OT and hemodynamic optimization.   
  
24Hr Events Renal function stable Mentation improving daily Stable hemodynamics INR 1.4 Procedure:  Procedure(s): REMOVAL TEMPORARY LEFT VENTRICULAR ASSIST DEVICE, IMPLANTATION OF LEFT VENTRICULAR ASSIST DEVICE PERMANENT (VAD), ECC, JACQUE, EPI AORTIC US BY DR Rosa Delgado.    
POD:  20 
  
Impression / Plan:  
Heart Failure Status: NYHA Class IV INTERMACS Category 2 
  
Chronic systolic heart failure due to ICM, NYHA Class IV, EF < 15%, cardiogenic shock bridged with Impella 5.0 support to HM 3 implant S/p Impella removal and LVAD implant 11/18/19 Continue RPMs at 5600 - LVIDd down to 5.3 cm  
TTE with bubble study negative for PFO Discontinue PAC - transduce CVP via PICC and attempt daily CardioMEMs readings (limited by electrical interference) Discontinue milrinone Hold Bumex - management per nephrology Cont Sildenafil 20 mg PO TID - rx sent to local pharmacy to initiate PA Intolerant of BB due to RV failure Intolerant of ACEi/ARB/ARNI/AA due to JUAN J on CKD 3 Strict I/O, daily weights, Na+ restricted diet Continue LVAD education Daily dressing changes until POD 30 Generalized myoclonus Improved Appreciate Neurology input Continue Keppra with renal dosing Head CT negative for acute process EEG suggestive of mild generalized encephalopathic process, possibly related to underlying structural brain injury vs metabolic abnormality Monitor closely Remain in CVICU 
  
Anticoagulation for LVAD, INR goal 2-3 Continue ASA INR 1.4 - received Vit K over the weekend 2 mg warfarin tonight No IV AC bridge for now - will reassess daily 
  
Acute Respiratory Failure post op Improved with aggressive diuresis CXR and O2 requirement improved TTE with Bubble study negative for PFO Pulmonary Consult appreciated Pulmonary hygiene Cont home CPAP- must use while sleeping  
  
Post op Pain PRN Tylenol Comfort measures  
  
L Brachial Artery Thrombosis s/p thrombectomy Surgical management per Dr. Ada Mejia Pain improved  
  
 Acute on CKD 3, atrophic left kidney Creatinine stable today Appreciate Nephrology assistance Watch Cr with d/c of milrinone Hold diuretics - goal UOP > 30 mL/hr Avoid nephrotoxins Trend labs  
  
CAD s/p CABG Cont low dose ASA- watch platelets No BB d/t RV failure Hold statin due to recent hepatic failure LHC performed 11/13 - low likelihood of benefit from revascularization  
  
Hx of VF arrest s/p BiV-ICD No recent shocks Keep K > 4 and Mg > 2  
   
PAF Currently in ST, Paced Hold digoxin - level 1.3 today - do not resume until < 1 Repeat TFTs WNL 
  
Hx of gout Uric acid WNL 
  
Suspected aspiration pneumonia RUL consolidation Suspect aspiration related to over sedation Sputum culture showing scant growth of yeast 
Continue Zosyn with renal dosing Urinary retention, c/b serratia UTI and hematuria Appreciate Urology consult Cystoscopy performed 11/13 shows catheter induced cystitis Repeat UA shows resolution of UTI  
  
Malnutrition Appreciate Nutritionist consult Prealbumin weekly Continue full liquid diet with nectar thick liquids Cont TF via dobhoff - at goal  
Intolerant of mirtazapine d/t tremors, confusion  
  
COPD Appreciate Pulmonology assistance Continue nebs PRN   
  
Anemia Multifactorial, due to hemolysis + epistaxis + hematuria Intolerant of octreotide or doxycycline for AVM ppx due to prolonged QTc Transfuse for Hgb goal > 8 
  
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  
  
Hx of sternal wound infection, sternectomy Sternum closed post op- monitor  
  
Vocal cord paralysis Improved ENT recs appreciated Neck CT- R neck edema, no airway compromise Speech therapy following - appreciate input 
  
Debility Continue PT/OT  
  
Ppx Protonix PT/OT Bowel regimen Continue full liquid diet with nectar thick liquidsd PICC placed 
  
Dispo: 
Likely will need IP rehab. Remain in CVICU for now. Patient seen and examined. Data and note reviewed. I have discussed and agree with the plans as noted. 71year old male with a history of ICM s/p LVAD as DT whose post op course has been complicated by RV dysfunction, volume overload, and JUAN J on CKD with over-diuresis as myoclonus and diplopia. He received IV vitamin K yesterday for supra-therapeutic INR. WIll start IV bivalrudin until INR returns to the goal of 2-3. Hemodynamics remain stable off IV milrinone - will remove PA cath and transfer to CVSU when a bed is available. Thank you for allowing us to participate in your patient's care. Mounika Jones MD, Mellissa Schlatter Chief of Cardiology, [facility] Medical Director Calos Rueda [address] [place] [address], [address] [facility], [address] [place] [address] Office [phone] Fax [phone] LVAD INTERROGATION: 
Device interrogated in person Device function normal, normal flow, no events LVAD Pump Speed (RPM): 9810 Pump Flow (LPM): 4.7 MAP: 88 
PI (Pulsitility Index): 3.2 Power: 4.1  Test: No 
Back Up  at Bedside & Labeled: Yes Power Module Test: No 
Driveline Site Care: No 
Driveline Dressing: Clean, Dry, and Intact Outpatient: No 
MAP in Therapeutic Range (Outpatient): Yes Testing Alarms Reviewed: Yes 
Back up SC speed: 5600 Back up Low Speed Limit: 5200 Emergency Equipment with Patient?: Yes Emergency procedures reviewed?: Yes Drive line site inspected?: No 
Drive line intergrity inspected?: Yes Drive line dressing changed?: No 
 
PHYSICAL EXAM: 
Visit Vitals BP (!) 115/92 (BP 1 Location: Left arm, BP Patient Position: At rest) Pulse 73 Temp 99.1 °F (37.3 °C) Resp 27 Ht 6' 2\" (1.88 m) Wt 188 lb 11.4 oz (85.6 kg) SpO2 100% BMI 24.23 kg/m² Hemodynamics: 
 CO: CO (l/min): 4.7 l/min CI: CI (l/min/m2): 2.3 l/min/m2 CVP: CVP (mmHg): 10 mmHg (12/09/19 1200) SVR: SVR (dyne*sec)/cm5: 1080 (dyne*sec)/cm5 (12/09/19 1200) PAP Systolic: PAP Systolic: 38 (98/80/99 3823) PAP Diastolic: PAP Diastolic: 17 (97/57/17 5325) PVR:   
 SV02: SVO2 (%): 67 % (12/09/19 1200) SCV02: SCVO2 (%): 75 % (10/29/19 1900) PCWP: 15 mmHg Physical Exam  
Constitutional: He appears well-developed. He appears lethargic. He appears cachectic. No distress. More fatigued today HENT:  
Head: Normocephalic. Neck: Normal range of motion. Neck supple. No JVD present. Cardiovascular: Regular rhythm. + VAD hum Pulmonary/Chest: Effort normal and breath sounds normal. No respiratory distress. Abdominal: Soft. Bowel sounds are normal. He exhibits no distension. Dobhoff in place Musculoskeletal: Normal range of motion. General: No edema. Neurological: He appears lethargic. Intermittent confusion Skin: Skin is warm and dry. Nursing note and vitals reviewed. REVIEW OF SYSTEMS: 
Review of Systems Constitutional: Positive for malaise/fatigue. Negative for chills and fever. HENT: Positive for hearing loss. Respiratory: Positive for shortness of breath. Negative for cough. Mild dyspnea Cardiovascular: Negative for chest pain, palpitations, orthopnea and leg swelling. Gastrointestinal: Negative for heartburn, nausea and vomiting. Genitourinary: Negative. Musculoskeletal: Negative. Neurological: Positive for weakness. Negative for dizziness and headaches. Endo/Heme/Allergies: Negative. PAST MEDICAL HISTORY: 
Past Medical History:  
Diagnosis Date  Degenerative disc disease, lumbar  Heart failure ([address]  High cholesterol  Hypertension  Paroxysmal atrial fibrillation ([address] 4/2/2019  Spinal stenosis PAST SURGICAL HISTORY: 
Past Surgical History:  
Procedure Laterality Date  COLONOSCOPY Left 11/11/2019 COLONOSCOPY at bedside performed by Catalina Guerrero MD at [facility] ENDOSCOPY  HX APPENDECTOMY  HX CORONARY ARTERY BYPASS GRAFT    
 triple  HX HERNIA REPAIR    
 HX IMPLANTABLE CARDIOVERTER DEFIBRILLATOR  AL CARDIOVERSION ELECTIVE ARRHYTHMIA EXTERNAL N/A 6/10/2019 EP CARDIOVERSION performed by Anton Durham MD at [address], [facility] [address] CATH LAB  AL CARDIOVERSION ELECTIVE ARRHYTHMIA EXTERNAL N/A 2019 EP CARDIOVERSION performed [facility]ielena Rizo [facility] [address] CATH LAB  AL INSJ/RPLCMT PERM DFB W/TRNSVNS LDS 1/DUAL CHMBR N/A 2019 INSERT ICD BIV MULTI performed by James Cr MD at [address], [facility] [address] CATH LAB  AL TCAT IMPL WRLS P-ART PRS SNR L-T HEMODYN MNTR N/A 2019 IMPLANT HEART FAILURE MONITORING DEVICE performed by Maikel Lawrence MD at [facility] [address], [facility] [address] CATH LAB FAMILY HISTORY: 
Family History Problem Relation Age of Onset  Lung Disease Mother  Hypertension Mother [facility] Arthritis-osteo Mother  Heart Disease Mother  Heart Disease Father  Diabetes Father SOCIAL HISTORY: 
Social History Socioeconomic History  Marital status:  Spouse name: Not on file  Number of children: Not on file  Years of education: Not on file  Highest education level: Not on file Tobacco Use  Smoking status: Former Smoker Last attempt to quit: 2010 Years since quittin.0  Smokeless tobacco: Never Used Substance and Sexual Activity  Alcohol use: Not Currently Comment: rarely  Drug use: Never Other Topics Concern LABORATORY RESULTS: 
  
Labs Latest Ref Rng & Units 2019 WBC 4.1 - 11.1 K/uL 5.2 5.5 5.1 5.1 - - 5.0  
RBC 4.10 - 5.70 M/uL 2.67(L) 2.68(L) 2.85(L) 2.79(L) - - 3.07(L) Hemoglobin 12.1 - 17.0 g/dL 7. 9(L) 8.0(L) 8.4(L) 8. 3(L) - - 9. 1(L) Hematocrit 36.6 - 50.3 % 26. 9(L) 26. 9(L) 28. 3(L) 27. 9(L) - - 29. 9(L) MCV 80.0 - 99.0 . 7(H) 100. 4(H) 99. 3(H) 100. 0(H) - - 97.4 Platelets 348 - 020 K/uL 134(L) 146(L) 174 201 - - 203 Lymphocytes 12 - 49 % - - - - - - - Monocytes 5 - 13 % - - - - - - - Eosinophils 0 - 7 % - - - - - - - Basophils 0 - 1 % - - - - - - - Albumin 3.5 - 5.0 g/dL 2. 6(L) 2. 9(L) 3. 1(L) 3. 2(L) - - 2. 9(L) Calcium 8.5 - 10.1 MG/DL 9.0 8.6 9.4 9.3 9.2 9.6 9.2 SGOT 15 - 37 U/L 15 14(L) 12(L) 14(L) - - 11(L) Glucose 65 - 100 mg/dL 120(H) 142(H) 145(H) 98 136(H) 116(H) 110(H) BUN 6 - 20 MG/DL 39(H) 34(H) 29(H) 24(H) 23(H) 20 19 Creatinine 0.70 - 1.30 MG/DL 3.43(H) 3.46(H) 3.01(H) 2.38(H) 2.05(H) 1.92(H) 1.52(H) Sodium 136 - 145 mmol/L 143 147(H) 147(H) 143 141 140 140 Potassium 3.5 - 5.1 mmol/L 4.2 3.9 3.2(L) 3.9 3.7 4.6 3. 0(L) TSH 0.36 - 3.74 uIU/mL - - - - - - -  
PSA 0.01 - 4.0 ng/mL - - - - - - -  
LDH 85 - 241 U/L 257(H) 247(H) 250(H) 234 - - 249(H) CEA ng/mL - - - - - - - Some recent data might be hidden Lab Results Component Value Date/Time TSH 2.30 12/03/2019 03:29 AM  
 TSH 2.40 10/25/2019 07:39 PM  
 TSH 2.45 06/01/2019 04:16 AM  
 
 
ALLERGY: 
No Known Allergies CURRENT MEDICATIONS: 
Current Facility-Administered Medications Medication Dose Route Frequency  warfarin (COUMADIN) tablet 2 mg  2 mg Oral ONCE  piperacillin-tazobactam (ZOSYN) 3.375 g in 0.9% sodium chloride (MBP/ADV) 100 mL  3.375 g IntraVENous Q12H  aspirin chewable tablet 81 mg  81 mg Oral DAILY  levETIRAcetam (KEPPRA) 250 mg in 0.9% sodium chloride 100 mL IVPB  250 mg IntraVENous Q12H  
 0.9% sodium chloride infusion  6 mL/hr IntraVENous CONTINUOUS  
 albuterol-ipratropium (DUO-NEB) 2.5 MG-0.5 MG/3 ML  3 mL Nebulization Q4H RT  
 allopurinol (ZYLOPRIM) tablet 100 mg  100 mg Oral DAILY  sildenafil (REVATIO) 2 mg/mL oral suspension 20 mg  20 mg Oral Q8H  
 pantoprazole (PROTONIX) 40 mg in 0.9% sodium chloride 10 mL injection  40 mg IntraVENous DAILY  arformoterol (BROVANA) neb solution 15 mcg  15 mcg Nebulization BID RT  And  
  budesonide (PULMICORT) 500 mcg/2 ml nebulizer suspension  500 mcg Nebulization BID RT  
 milrinone (PRIMACOR) 20 MG/100 ML D5W infusion  0.1 mcg/kg/min IntraVENous CONTINUOUS  
 magnesium oxide (MAG-OX) tablet 400 mg  400 mg Oral DAILY  artificial saliva (MOUTH KOTE) 1 Spray  1 Spray Oral PRN  
 lactobac ac& pc-s.therm-b.anim (TIAN Q/RISAQUAD)  1 Cap Oral DAILY  oxyCODONE IR (ROXICODONE) tablet 5 mg  5 mg Oral Q6H PRN  
 balsam peru-castor oil (VENELEX) ointment   Topical TID  tamsulosin (FLOMAX) capsule 0.4 mg  0.4 mg Oral DAILY  insulin lispro (HUMALOG) injection   SubCUTAneous AC&HS  Warfarin MD/NP dosing   Other PRN  
 epoetin yvonne-epbx (RETACRIT) injection 20,000 Units  20,000 Units SubCUTAneous Q7D  
 sodium chloride (NS) flush 5-40 mL  5-40 mL IntraVENous Q8H  
 sodium chloride (NS) flush 5-40 mL  5-40 mL IntraVENous PRN  
 acetaminophen (TYLENOL) tablet 650 mg  650 mg Oral Q6H PRN  
 naloxone (NARCAN) injection 0.4 mg  0.4 mg IntraVENous PRN  
 ondansetron (ZOFRAN) injection 4 mg  4 mg IntraVENous Q4H PRN  
 senna-docusate (PERICOLACE) 8.6-50 mg per tablet 1 Tab  1 Tab Oral DAILY  bisacodyl (DULCOLAX) tablet 5 mg  5 mg Oral DAILY PRN  
 sucralfate (CARAFATE) tablet 1 g  1 g Oral AC&HS  influenza vaccine 2019-20 (6 mos+)(PF) (FLUARIX/FLULAVAL/FLUZONE QUAD) injection 0.5 mL  0.5 mL IntraMUSCular PRIOR TO DISCHARGE  hydrALAZINE (APRESOLINE) 20 mg/mL injection 20 mg  20 mg IntraVENous Q6H PRN  
 dextrose 10 % infusion 125-250 mL  125-250 mL IntraVENous PRN Thank you for allowing me to participate in this patient's care. TIERRA Sarkar [address] [facility] 
[address] [facility], [address] Phone: [phone] Fax: [phone]

## 2019-12-09 NOTE — PROGRESS NOTES
NYHA class IV A/C systolic heart failure Low EF (10%) Inotrope dependent Malnutrition  
LVAD Work-up Epistaxis Hematuria RV dysfunction Fluid overload Dyspnea and fatigue a little better today Cutting back on diuresis Tremors much better Wife in to visit this afternoon Hgb and platelets mildly depressed Creatinine in the 3's Bilirubin and other LFTs look good Lactic acid and LDH reasonable NT pro-BNP elevated Pre-albumin very low - placed dobhoff for nutrition CXR - mild pulmonary edema Visit Vitals BP (!) 115/92 (BP 1 Location: Left arm, BP Patient Position: At rest) Pulse 73 Temp 99.1 °F (37.3 °C) Resp 27 Ht 6' 2\" (1.88 m) Wt 188 lb 11.4 oz (85.6 kg) SpO2 100% BMI 24.23 kg/m² LVAD Pump Speed (RPM): 6411 Pump Flow (LPM): 4.7 MAP: 88 
PI (Pulsitility Index): 3.2 Power: 4.1  Test: No 
Back Up  at Bedside & Labeled: Yes Power Module Test: No 
Driveline Site Care: No 
Driveline Dressing: Clean, Dry, and Intact Outpatient: No 
MAP in Therapeutic Range (Outpatient): Yes Testing Alarms Reviewed: Yes 
Back up SC speed: 5600 Back up Low Speed Limit: 5200 Emergency Equipment with Patient?: Yes Emergency procedures reviewed?: Yes Drive line site inspected?: No 
Drive line intergrity inspected?: Yes Drive line dressing changed?: No 
 
 
Intake/Output Summary (Last 24 hours) at 12/9/2019 1443 Last data filed at 12/9/2019 1300 Gross per 24 hour Intake 2320.05 ml Output 1250 ml Net 1070.05 ml Visit Vitals BP (!) 115/92 (BP 1 Location: Left arm, BP Patient Position: At rest) Pulse 73 Temp 99.1 °F (37.3 °C) Resp 27 Ht 6' 2\" (1.88 m) Wt 188 lb 11.4 oz (85.6 kg) SpO2 100% BMI 24.23 kg/m² Risk of morbidity and mortality - high Medical decision making - high complexity Total critical care time - 30 minutes (CPT 85208)

## 2019-12-10 NOTE — PROGRESS NOTES
2000- Bedside and Verbal shift change report given to [address] (oncoming nurse) by Danette Patircia RN (offgoing nurse). Report included the following information SBAR, Procedure Summary, Intake/Output, Recent Results and Cardiac Rhythm Paced. 0000- Pt reassessed. No change. 0100- Pt very restless. Stating he feels very hot and that he feels like he can't breath. No audible change in lung sounds. Sat up higher in the bed and provided a fan for comfort. Pt also stated that his back was aching. Gave 650mg of tylenol for pain control. 0300- Pt continues to be very restless and stating he is either hot or cold. Unable to find a comfortable position and continues to state that he is having trouble breathing. Again no change in lung assessment. Sat up in modified chair position and placed pt home CPAP on with 6L bled into machine. 0400- Pt labs sent. Pt finally asleep and appears to be comfortable. Sleeping in semi-upright position with CPAP mask on. Will continue to monitor. Will reassess pt and perform MAP when he awakens. Asked X-ray to come back once pt awake. 0530-Pt woke up stating he couldn't catch his breathe. Encouraged further use of CPAP. Performed mouth care. Found MAP to be 110. Gave Hydralazine. 0600- Rechecked MAP. Came down to 80. Pt stated could no longer tolerate CPAP. Placed back on 4L NC. Will continue to observe. 0800- Bedside and Verbal shift change report given to [facility] RN/Indira RN (oncoming nurse) by Mitra Dexter RN (offgoing nurse). Report included the following information SBAR, Procedure Summary, Intake/Output, Recent Results and Cardiac Rhythm Paced.

## 2019-12-10 NOTE — PROGRESS NOTES
ID Progress Note 12/10/2019 Subjective:  
 
Thoracentesis today--serosanguinous fluid Objective: Antibiotics: 1. Levaquin 2. Rifampin 3. Fluconazole 4. Vancomycin 5. Cefazolin 6. Zosyn Vitals:  
Visit Vitals BP 98/60 (BP 1 Location: Left arm, BP Patient Position: At rest) Pulse 75 Temp 98 °F (36.7 °C) Resp 19 Ht 6' 2\" (1.88 m) Wt 88.2 kg (194 lb 7.1 oz) SpO2 91% BMI 24.97 kg/m² Tmax:  Temp (24hrs), Av °F (36.7 °C), Min:96.6 °F (35.9 °C), Max:99 °F (37.2 °C) Exam:  Lungs:  clear to auscultation bilaterally Heart:  regular rate and rhythm Abdomen:  soft, non-tender. Bowel sounds normal. No masses,  no organomegaly Urine is clearing up Labs:     
Recent Labs 12/10/19 
9646 19 
4087 19 
3232 WBC 4.8 5.2 5.5 HGB 7.8* 7.9* 8.0*  
* 134* 146* BUN 40* 39* 34* CREA 3.08* 3.43* 3.46* SGOT 18 15 14*  94 94 TBILI 0.6 0.6 0.7 Cultures: No results found for: [facility] Lab Results Component Value Date/Time Culture result: NO GROWTH 4 DAYS 2019 11:23 PM  
 Culture result: HEAVY ENTEROCOCCUS SPECIES NOTED (A) 2019 11:10 AM  
 Culture result: LIGHT YEAST (A) 2019 11:10 AM  
 
 
Radiology:  
 
Line/Insert Date:        
 
Assessment:  
 
1. UTI--Serratia (2 biotypes) from culture and small amount of Enterococcus 2. CHF/cardiomyopathy 3. ?aspiration event(s) 4. Renal insufficiency Objective: 1. Continue current therapy Walter Llanes MD

## 2019-12-10 NOTE — PROGRESS NOTES
[address] [facility] [address] in [place], [place] Inpatient Progress Note Patient name: Aydee Diaz Patient : 1950 Patient MRN: [id number] Attending MD: Soledad Sosa MD 
Date of service: 12/10/19 Chief Complaint:  
Haris Jamesft azucena LVAD implant 
  
HPI: Sona Kiser is a 71y.o. year old pleasant white male with a history of HTN, HLD, JOSE, CAD s/p cardiac arrest VFib s/p CABG () c/b sternal would infection and sternectomy, ischemic cardiomyopathy LVEF 15-20%, s/p ICD and with LBBB. He was admitted with acute on chronic systolic heart failure with massive volume overload > 20 lbs, in the setting of atrial fibrillation s/p failed DCCV and underwent DCCV and RHC on .  S/p BiVICD on 19 with Dr. Odilon Rogers. He was discharged home home on IV milrinone on 19. Jordi Combs has been followed closely by Dr. Sofie Keenan and the [facility]. 
  
Mr. Kiser was admitted for acute on chronic systolic heart failure. He underwent implantation of Impella 5.0 due to CS and has completed his LVAD evaluation. Jordi Combs meets criteria for implant of HM3 as DT. He was NYHA Class IV prior to implant of Impella 5.0, has LVEF < 15%, was intolerant of GDMT due to symptomatic hypotension and renal dysfunction. He remains dependent on temporary MCS support. RHC  revealed compensated hemodynamics on Impella. His renal function has improved dramatically with improvement in his hemodynamics. He is not a suitable transplant candidate due to single kidney, sternectomy, debility, and frailty. He was reviewed by Eric Eaton and felt to be a high risk heart transplant candidate due to multiple co-morbidities as well as the afore mentioned conditions.  He remained in the CCU and underwent a HM 3 implant as DT on . He was weaned off of pressors and transferred to [place] [address] where he continues to undergo PT/OT and hemodynamic optimization.   
  
24Hr Events Milrinone discontinued CVP 7-14 mmHg MAP  mmHg Renal function improving daily Continues to complain of dyspnea INR 1.4 Procedure:  Procedure(s): REMOVAL TEMPORARY LEFT VENTRICULAR ASSIST DEVICE, IMPLANTATION OF LEFT VENTRICULAR ASSIST DEVICE PERMANENT (VAD), ECC, JACQUE, EPI AORTIC US BY DR Lebron Tan.    
POD:  21 
  
Impression / Plan:  
Heart Failure Status: NYHA Class IV Chronic systolic heart failure due to ICM, NYHA Class IV, EF < 15%, cardiogenic shock bridged with Impella 5.0 support to HM 3 implant S/p Impella removal and LVAD implant 11/18/19 Continue RPMs at 5600 - LVIDd down to 5.3 cm  
TTE with bubble study negative for PFO Repeat TTE today Transduce CVP via PICC and attempt daily CardioMEMs readings (limited by electrical interference) Stable off of milrinone Bumex 2 mg IV PRN CVP > 10 mmHg - management per nephrology Cont Sildenafil 20 mg PO TID - rx sent to local pharmacy to initiate PA Intolerant of BB due to RV failure Intolerant of ACEi/ARB/ARNI/AA due to JUAN J on CKD 3 Strict I/O, daily weights, Na+ restricted diet Continue LVAD education Daily dressing changes until POD 30 Generalized myoclonus Improved Appreciate Neurology input Continue Keppra with renal dosing Head CT negative for acute process EEG suggestive of mild generalized encephalopathic process, possibly related to underlying structural brain injury vs metabolic abnormality Monitor closely Remain in CVICU 
  
Anticoagulation for LVAD, INR goal 2-3 Continue ASA INR 1.4 - received Vit K over the weekend 4 mg warfarin tonight Begin bivalirudin - continue until INR therapeutic  
  
Acute Respiratory Failure post op Improved with aggressive diuresis, but still c/o dyspnea CXR and O2 requirement improved ABG acceptable TTE with Bubble study negative for PFO Pulmonary Consult appreciated Pulmonary hygiene Cont home CPAP- must use while sleeping Thoracentesis with pigtail cath today  
  
Post op Pain PRN Tylenol Comfort measures  
  
 L Brachial Artery Thrombosis s/p thrombectomy Surgical management per Dr. Lawrence Patiño Pain improved  
  
Acute on CKD 3, atrophic left kidney Creatinine stable today Appreciate Nephrology assistance Watch Cr - improving Bumex PRN CVP > 10 mmHg Avoid nephrotoxins, trend labs Renally dose meds  
  
CAD s/p CABG Cont low dose ASA- watch platelets No BB d/t RV failure Hold statin due to recent hepatic failure LHC performed 11/13 - low likelihood of benefit from revascularization  
  
Hx of VF arrest s/p BiV-ICD No recent shocks Keep K > 4 and Mg > 2  
   
PAF In Afib today Hold digoxin - level 1.2 today - do not resume until < 1 Repeat TFTs WNL 
  
Hx of gout Uric acid WNL 
  
Suspected aspiration pneumonia RUL consolidation Suspect aspiration related to over sedation Sputum culture showing scant growth of yeast 
Continue Zosyn with renal dosing Urinary retention, c/b serratia UTI and hematuria Appreciate Urology consult Cystoscopy performed 11/13 shows catheter induced cystitis Repeat UA shows resolution of UTI  
  
Malnutrition Appreciate Nutritionist consult Prealbumin weekly Continue full liquid diet with nectar thick liquids Cont TF via dobhoff - at goal  
Intolerant of mirtazapine d/t tremors, confusion  
  
COPD Appreciate Pulmonology assistance Continue nebs PRN   
  
Anemia Multifactorial, due to hemolysis + epistaxis + hematuria Intolerant of octreotide or doxycycline for AVM ppx due to prolonged QTc Transfuse for Hgb goal > 8 
  
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  
  
Hx of sternal wound infection, sternectomy Sternum closed post op- monitor  
  
Vocal cord paralysis Improved ENT recs appreciated Neck CT- R neck edema, no airway compromise Speech therapy following - appreciate input 
  
Debility Continue PT/OT  
  
Ppx Protonix PT/OT Bowel regimen Continue full liquid diet with nectar thick liquids PICC placed 
  
 Dispo: Likely will need IP rehab. Remain in CVICU for now. Patient seen and examined. Data and note reviewed. I have discussed and agree with the plans as noted. 71year old male with a history of ICM s/p LVAD as DT whose post op course has been complicated by RV failure, respiratory failure, myoclonus, diplopia, sleep deprivation, and debility. Plan for thoracentesis/chest tube today for persistent left pleural effusion. Repeat echo to assess LV and RV size on current LVAD speed. Thank you for allowing us to participate in your patient's care. Mounika Peterson MD, Claudia Muhammad Chief of Cardiology, [facility] Medical Director Calos Rueda [address] [place] [address], [address] Office [phone] Fax [phone] LVAD INTERROGATION: 
Device interrogated in person Device function normal, normal flow, no events LVAD Pump Speed (RPM): 2199 Pump Flow (LPM): 4.7 MAP: 78 
PI (Pulsitility Index): 3.4 Power: 4.2  Test: Yes 
Back Up  at Bedside & Labeled: Yes Power Module Test: Yes Driveline Site Care: No 
Driveline Dressing: Clean, Dry, and Intact Outpatient: No 
MAP in Therapeutic Range (Outpatient): Yes Testing Alarms Reviewed: Yes 
Back up SC speed: 5600 Back up Low Speed Limit: 5200 Emergency Equipment with Patient?: Yes Emergency procedures reviewed?: Yes Drive line site inspected?: Yes Drive line intergrity inspected?: Yes Drive line dressing changed?: No 
 
PHYSICAL EXAM: 
Visit Vitals /58 (BP 1 Location: Left arm, BP Patient Position: At rest;Sitting) Pulse 86 Temp 98 °F (36.7 °C) Resp 24 Ht 6' 2\" (1.88 m) Wt 194 lb 7.1 oz (88.2 kg) SpO2 94% BMI 24.97 kg/m² Hemodynamics: 
 CVP: CVP (mmHg): 8 mmHg (12/10/19 1300) Physical Exam  
Constitutional: He appears well-developed. He appears lethargic. He appears cachectic. No distress. More fatigued today HENT:  
Head: Normocephalic. Neck: Normal range of motion. Neck supple. No JVD present. Cardiovascular: Regular rhythm. + VAD hum Pulmonary/Chest: Effort normal and breath sounds normal. No respiratory distress. Abdominal: Soft. Bowel sounds are normal. He exhibits no distension. Dobhoff in place Musculoskeletal: Normal range of motion. General: No edema. Neurological: He appears lethargic. Skin: Skin is warm and dry. Nursing note and vitals reviewed. REVIEW OF SYSTEMS: 
Review of Systems Constitutional: Positive for malaise/fatigue. Negative for chills and fever. HENT: Positive for hearing loss. Respiratory: Positive for shortness of breath. Negative for cough. Mild dyspnea Cardiovascular: Negative for chest pain, palpitations, orthopnea and leg swelling. Gastrointestinal: Negative for heartburn, nausea and vomiting. Genitourinary: Negative. Musculoskeletal: Negative. Neurological: Positive for weakness. Negative for dizziness and headaches. Endo/Heme/Allergies: Negative. PAST MEDICAL HISTORY: 
Past Medical History:  
Diagnosis Date  Degenerative disc disease, lumbar  Heart failure ([address]  High cholesterol  Hypertension  Paroxysmal atrial fibrillation ([address] 4/2/2019  Spinal stenosis PAST SURGICAL HISTORY: 
Past Surgical History:  
Procedure Laterality Date  COLONOSCOPY Left 11/11/2019 COLONOSCOPY at bedside performed by Milena Goncalves MD at [address]  HX CORONARY ARTERY BYPASS GRAFT    
 triple  HX HERNIA REPAIR    
 HX IMPLANTABLE CARDIOVERTER DEFIBRILLATOR  ND CARDIOVERSION ELECTIVE ARRHYTHMIA EXTERNAL N/A 6/10/2019 EP CARDIOVERSION performed by Eris Park MD at [place] [address], [facility]  CATH LAB  ND CARDIOVERSION ELECTIVE ARRHYTHMIA EXTERNAL N/A 6/18/2019  EP CARDIOVERSION performed by Debby Mosqueda MD at [place] [address], [facility] CATH LAB  
  CO INSJ/RPLCMT PERM DFB W/TRNSVNS LDS 1/DUAL CHMBR N/A 2019 INSERT ICD BIV MULTI performed by Xenia Pat MD at [address], [facility]  CATH LAB  CO TCAT IMPL WRLS P-ART PRS SNR L-T HEMODYN MNTR N/A 2019 IMPLANT HEART FAILURE MONITORING DEVICE performed by Miles Garza MD at [address], [facility]  CATH LAB FAMILY HISTORY: 
Family History Problem Relation Age of Onset  Lung Disease Mother  Hypertension Mother Ayanna Morales Arthritis-osteo Mother  Heart Disease Mother  Heart Disease Father  Diabetes Father SOCIAL HISTORY: 
Social History Socioeconomic History  Marital status:  Spouse name: Not on file  Number of children: Not on file  Years of education: Not on file  Highest education level: Not on file Tobacco Use  Smoking status: Former Smoker Last attempt to quit: 2010 Years since quittin.0  Smokeless tobacco: Never Used Substance and Sexual Activity  Alcohol use: Not Currently Comment: rarely  Drug use: Never Other Topics Concern LABORATORY RESULTS: 
  
Labs Latest Ref Rng & Units 12/10/2019 2019 2019 2019 2019 2019 2019 WBC 4.1 - 11.1 K/uL 4.8 5.2 5.5 5.1 5.1 - -  
RBC 4.10 - 5.70 M/uL 2.63(L) 2.67(L) 2.68(L) 2.85(L) 2.79(L) - - Hemoglobin 12.1 - 17.0 g/dL 7. 8(L) 7. 9(L) 8.0(L) 8.4(L) 8. 3(L) - - Hematocrit 36.6 - 50.3 % 26. 7(L) 26. 9(L) 26. 9(L) 28. 3(L) 27. 9(L) - - MCV 80.0 - 99.0 . 5(H) 100. 7(H) 100. 4(H) 99. 3(H) 100. 0(H) - - Platelets 782 - 913 K/uL 138(L) 134(L) 146(L) 174 201 - - Lymphocytes 12 - 49 % - - - - - - - Monocytes 5 - 13 % - - - - - - - Eosinophils 0 - 7 % - - - - - - - Basophils 0 - 1 % - - - - - - - Albumin 3.5 - 5.0 g/dL 2. 6(L) 2. 6(L) 2. 9(L) 3. 1(L) 3. 2(L) - -  
Calcium 8.5 - 10.1 MG/DL 8.8 9.0 8.6 9.4 9.3 9.2 9.6 SGOT 15 - 37 U/L 18 15 14(L) 12(L) 14(L) - -  
Glucose 65 - 100 mg/dL 96 120(H) 142(H) 145(H) 98 136(H) 116(H) BUN 6 - 20 MG/DL 40(H) 39(H) 34(H) 29(H) 24(H) 23(H) 20 Creatinine 0.70 - 1.30 MG/DL 3.08(H) 3.43(H) 3.46(H) 3.01(H) 2.38(H) 2.05(H) 1.92(H) Sodium 136 - 145 mmol/L 143 143 147(H) 147(H) 143 141 140 Potassium 3.5 - 5.1 mmol/L 4.2 4.2 3.9 3.2(L) 3.9 3.7 4.6 TSH 0.36 - 3.74 uIU/mL - - - - - - -  
PSA 0.01 - 4.0 ng/mL - - - - - - -  
LDH 85 - 241 U/L 257(H) 257(H) 247(H) 250(H) 234 - -  
CEA ng/mL - - - - - - - Some recent data might be hidden Lab Results Component Value Date/Time TSH 2.30 12/03/2019 03:29 AM  
 TSH 2.40 10/25/2019 07:39 PM  
 TSH 2.45 06/01/2019 04:16 AM  
 
 
ALLERGY: 
No Known Allergies CURRENT MEDICATIONS: 
Current Facility-Administered Medications Medication Dose Route Frequency  bivalirudin (ANGIOMAX) 250 mg in 0.9% sodium chloride (MBP/ADV) 50 mL  0.05 mg/kg/hr IntraVENous TITRATE  warfarin (COUMADIN) tablet 4 mg  4 mg Oral ONCE  
 [START ON 12/11/2019] pantoprazole (PROTONIX) tablet 40 mg  40 mg Oral ACB  piperacillin-tazobactam (ZOSYN) 3.375 g in 0.9% sodium chloride (MBP/ADV) 100 mL  3.375 g IntraVENous Q12H  aspirin chewable tablet 81 mg  81 mg Oral DAILY  levETIRAcetam (KEPPRA) 250 mg in 0.9% sodium chloride 100 mL IVPB  250 mg IntraVENous Q12H  
 0.9% sodium chloride infusion  3 mL/hr IntraVENous CONTINUOUS  
 albuterol-ipratropium (DUO-NEB) 2.5 MG-0.5 MG/3 ML  3 mL Nebulization Q4H RT  
 allopurinol (ZYLOPRIM) tablet 100 mg  100 mg Oral DAILY  sildenafil (REVATIO) 2 mg/mL oral suspension 20 mg  20 mg Oral Q8H  
 arformoterol (BROVANA) neb solution 15 mcg  15 mcg Nebulization BID RT And  
 budesonide (PULMICORT) 500 mcg/2 ml nebulizer suspension  500 mcg Nebulization BID RT  
 magnesium oxide (MAG-OX) tablet 400 mg  400 mg Oral DAILY  artificial saliva (MOUTH KOTE) 1 Spray  1 Spray Oral PRN  
 lactobac ac& pc-s.therm-b.anim (TIAN Q/RISAQUAD)  1 Cap Oral DAILY  oxyCODONE IR (ROXICODONE) tablet 5 mg  5 mg Oral Q6H PRN  
  balsam peru-castor oil (VENELEX) ointment   Topical TID  tamsulosin (FLOMAX) capsule 0.4 mg  0.4 mg Oral DAILY  insulin lispro (HUMALOG) injection   SubCUTAneous AC&HS  Warfarin MD/NP dosing   Other PRN  
 epoetin yvonne-epbx (RETACRIT) injection 20,000 Units  20,000 Units SubCUTAneous Q7D  
 sodium chloride (NS) flush 5-40 mL  5-40 mL IntraVENous Q8H  
 sodium chloride (NS) flush 5-40 mL  5-40 mL IntraVENous PRN  
 acetaminophen (TYLENOL) tablet 650 mg  650 mg Oral Q6H PRN  
 naloxone (NARCAN) injection 0.4 mg  0.4 mg IntraVENous PRN  
 ondansetron (ZOFRAN) injection 4 mg  4 mg IntraVENous Q4H PRN  
 senna-docusate (PERICOLACE) 8.6-50 mg per tablet 1 Tab  1 Tab Oral DAILY  bisacodyl (DULCOLAX) tablet 5 mg  5 mg Oral DAILY PRN  
 sucralfate (CARAFATE) tablet 1 g  1 g Oral AC&HS  influenza vaccine 2019-20 (6 mos+)(PF) (FLUARIX/FLULAVAL/FLUZONE QUAD) injection 0.5 mL  0.5 mL IntraMUSCular PRIOR TO DISCHARGE  hydrALAZINE (APRESOLINE) 20 mg/mL injection 20 mg  20 mg IntraVENous Q6H PRN  
 dextrose 10 % infusion 125-250 mL  125-250 mL IntraVENous PRN Thank you for allowing me to participate in this patient's care. TIRERA Ye [address] [place] [id number] [facility], [address] [facility] Phone: [phone] Fax: [phone]

## 2019-12-10 NOTE — PROGRESS NOTES
Problem: Falls - Risk of 
Goal: *Absence of Falls Description Document Adriangar Brunner Fall Risk and appropriate interventions in the flowsheet. Outcome: Progressing Towards Goal 
Note: Fall Risk Interventions: 
Mobility Interventions: Communicate number of staff needed for ambulation/transfer Mentation Interventions: Adequate sleep, hydration, pain control, Evaluate medications/consider consulting pharmacy Medication Interventions: Evaluate medications/consider consulting pharmacy Elimination Interventions: Toileting schedule/hourly rounds History of Falls Interventions: Consult care management for discharge planning Problem: Pain Goal: *Control of Pain Outcome: Progressing Towards Goal 
  
Problem: Pressure Injury - Risk of 
Goal: *Prevention of pressure injury Description Document Mich Scale and appropriate interventions in the flowsheet. Outcome: Progressing Towards Goal 
Note: Pressure Injury Interventions: 
Sensory Interventions: Assess changes in LOC Moisture Interventions: Absorbent underpads Activity Interventions: Pressure redistribution bed/mattress(bed type) Mobility Interventions: Assess need for specialty bed Nutrition Interventions: Document food/fluid/supplement intake Friction and Shear Interventions: Apply protective barrier, creams and emollients Problem: Heart Failure: Discharge Outcomes Goal: *Demonstrates ability to perform prescribed activity without shortness of breath or discomfort Outcome: Progressing Towards Goal 
Goal: *Left ventricular function assessment completed prior to or during stay, or planned for post-discharge Outcome: Progressing Towards Goal 
  
Problem: Diabetes Self-Management Goal: *Disease process and treatment process Description Define diabetes and identify own type of diabetes; list 3 options for treating diabetes. Outcome: Progressing Towards Goal 
Goal: *Incorporating nutritional management into lifestyle Description Describe effect of type, amount and timing of food on blood glucose; list 3 methods for planning meals. Outcome: Progressing Towards Goal 
Goal: *Incorporating physical activity into lifestyle Description State effect of exercise on blood glucose levels. Outcome: Progressing Towards Goal 
Goal: *Monitoring blood glucose, interpreting and using results Description Identify recommended blood glucose targets  and personal targets. Outcome: Progressing Towards Goal 
  
Problem: Nutrition Deficit Goal: *Optimize nutritional status Outcome: Progressing Towards Goal 
  
Problem: Nutrition Deficit Goal: *Optimize nutritional status Outcome: Progressing Towards Goal 
  
Problem: Infection - Risk of, Urinary Catheter-Associated Urinary Tract Infection Goal: *Absence of infection signs and symptoms Outcome: Progressing Towards Goal 
  
Problem: LVAD Post-op Day 15 to Discharge Goal: Off Pathway (Use only if patient is Off Pathway) Outcome: Progressing Towards Goal 
Note:  
Pt off milrinone and [place] removed. Pt on night tube feeds. Appears more alert and oriented. Will continue to observe closely Problem: Impaired Skin Integrity/Pressure Injury Treatment Goal: *Improvement of Existing Pressure Injury Outcome: Progressing Towards Goal 
  
Problem: Nutrition Deficit Goal: *Optimize nutritional status Outcome: Progressing Towards Goal

## 2019-12-10 NOTE — PROGRESS NOTES
Bedside and Verbal shift change report given to [id number] (oncoming nurse) by [facility] RN (offgoing nurse). Report included the following information SBAR, OR Summary, Procedure Summary, Intake/Output, MAR, Recent Results and Med Rec Status. 1130: Pt out of bed to chair with physical therapy. Max two assist. 
 
1400: Radiologist to bedside for chest tube placement. Placed under guided ultrasounds. 170ml of serous fluid drained. Chest tube placed to continuous suction. 1530: Patient's wife Skyler Cortes performed LVAD dressing change. Did well with heavy supervision. Needs reinforcement of sterile field. Patient tolerated dressing change well, now resting comfortably in bed. Bedside and Verbal shift change report given to Aki [address] (oncoming nurse) by [id number] (offgoing nurse). Report included the following information SBAR, OR Summary, Procedure Summary, Intake/Output, MAR and Cardiac Rhythm NSR with a BBB and 1st degree HB.

## 2019-12-10 NOTE — PROGRESS NOTES
[facility] 
 [address] [facility], [address] [facility] Phone: [phone]   [phone]   
  
Nephrology Progress Note Sona Kiser     1950     [id number] Date of Admission : 10/25/2019 
12/10/19 CC:  Follow up for JUAN J, CKD, Hyponatremia Assessment and Plan JUAN J on CKD: 
- likely ATN +/- overdiuresis - Cr improving - plans noted for thoracentesis today 
- diurese PRN if CVP > 10 
- daily labs Hypokalemia  
- replete PRN Hypernatremia : 
- Na stable 
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  
- unable to get CTA/MRA 
- per neuro - On Keppra 
  
Gross hematuria, BPH w/ retention: 
- off CBI pre VAD. cysto pre op showed catheter related Cystitis @ postr wall  
- neal had to be replaced due to urinary retention 
- flomax - on hold  
- keep neal for now until he is stable from CHF stand point Acute on Chronic HFrEF  
- EF 16-20%, NYHA Class IV , hx of VF arrest - s/p AICD 
- Impella insertion 10/29- removed 11/18  
- s/p LVAD placement on 11/18 Hoarseness, vocal cord paralysis, mediastinal adenopathy: 
- will need eventual PET/CT 
  
L arm clot s/p thrombectomy on 11/25 JOSE on CPAP  
  
PAF s/p DCCV 6/19 
  
Anemia: 
- from blood loss s/p multiple blood products - s/p IV iron,  Repeat iron studies ok 
- on PAVEL q7 d Serratia and Enteroccocus UTI: 
- completed abx Interval History:   
Seen and examined. Feeling SOB. CXR showing b/l pleural effusions. Cr better, voiding ok. Still confused. Review of Systems: UNABLE TO obtain ROS Current Medications:  
Current Facility-Administered Medications Medication Dose Route Frequency  bivalirudin (ANGIOMAX) 250 mg in 0.9% sodium chloride (MBP/ADV) 50 mL  0.05 mg/kg/hr IntraVENous TITRATE  piperacillin-tazobactam (ZOSYN) 3.375 g in 0.9% sodium chloride (MBP/ADV) 100 mL  3.375 g IntraVENous Q12H  aspirin chewable tablet 81 mg  81 mg Oral DAILY  levETIRAcetam (KEPPRA) 250 mg in 0.9% sodium chloride 100 mL IVPB  250 mg IntraVENous Q12H  
 0.9% sodium chloride infusion  6 mL/hr IntraVENous CONTINUOUS  
 albuterol-ipratropium (DUO-NEB) 2.5 MG-0.5 MG/3 ML  3 mL Nebulization Q4H RT  
 allopurinol (ZYLOPRIM) tablet 100 mg  100 mg Oral DAILY  sildenafil (REVATIO) 2 mg/mL oral suspension 20 mg  20 mg Oral Q8H  
 pantoprazole (PROTONIX) 40 mg in 0.9% sodium chloride 10 mL injection  40 mg IntraVENous DAILY  arformoterol (BROVANA) neb solution 15 mcg  15 mcg Nebulization BID RT And  
 budesonide (PULMICORT) 500 mcg/2 ml nebulizer suspension  500 mcg Nebulization BID RT  
 milrinone (PRIMACOR) 20 MG/100 ML D5W infusion  0.1 mcg/kg/min IntraVENous CONTINUOUS  
 magnesium oxide (MAG-OX) tablet 400 mg  400 mg Oral DAILY  artificial saliva (MOUTH KOTE) 1 Spray  1 Spray Oral PRN  
 lactobac ac& pc-s.therm-b.anim (TIAN Q/RISAQUAD)  1 Cap Oral DAILY  oxyCODONE IR (ROXICODONE) tablet 5 mg  5 mg Oral Q6H PRN  
 balsam peru-castor oil (VENELEX) ointment   Topical TID  tamsulosin (FLOMAX) capsule 0.4 mg  0.4 mg Oral DAILY  insulin lispro (HUMALOG) injection   SubCUTAneous AC&HS  Warfarin MD/NP dosing   Other PRN  
 epoetin yvonne-epbx (RETACRIT) injection 20,000 Units  20,000 Units SubCUTAneous Q7D  
 sodium chloride (NS) flush 5-40 mL  5-40 mL IntraVENous Q8H  
 sodium chloride (NS) flush 5-40 mL  5-40 mL IntraVENous PRN  
 acetaminophen (TYLENOL) tablet 650 mg  650 mg Oral Q6H PRN  
 naloxone (NARCAN) injection 0.4 mg  0.4 mg IntraVENous PRN  
 ondansetron (ZOFRAN) injection 4 mg  4 mg IntraVENous Q4H PRN  
 senna-docusate (PERICOLACE) 8.6-50 mg per tablet 1 Tab  1 Tab Oral DAILY  bisacodyl (DULCOLAX) tablet 5 mg  5 mg Oral DAILY PRN  
 sucralfate (CARAFATE) tablet 1 g  1 g Oral AC&HS  influenza vaccine 2019-20 (6 mos+)(PF) (FLUARIX/FLULAVAL/FLUZONE QUAD) injection 0.5 mL  0.5 mL IntraMUSCular PRIOR TO DISCHARGE  hydrALAZINE (APRESOLINE) 20 mg/mL injection 20 mg  20 mg IntraVENous Q6H PRN  
 dextrose 10 % infusion 125-250 mL  125-250 mL IntraVENous PRN No Known Allergies Objective: 
Vitals:   
Vitals:  
 12/10/19 0600 12/10/19 0629 12/10/19 0700 12/10/19 3305 BP:      
Pulse: 70  76 Resp: 19  30 Temp:      
SpO2: 100%  94% 99% Weight:  88.2 kg (194 lb 7.1 oz) Height:      
 
Intake and Output: 
No intake/output data recorded. 12/08 1901 - 12/10 0700 In: 5592.1 [P.O.:1800; I.V.:732.1] Out: 1875 [[id number]] Physical Examination: 
 
General: In bed, restless legs Neck:  Lines + Resp:  Decreased bibasilar breath sounds CV:  VAD sounds; trace LE edema GI:  Soft and non-tender; no distension Neurologic:  Sleeping :  + neal; clear urine [x]    High complexity decision making was performed 
[x]    Patient is at high-risk of decompensation with multiple organ involvement Lab Data Personally Reviewed: I have reviewed all the pertinent labs, microbiology data and radiology studies during assessment. Recent Labs 12/10/19 
8133 12/09/19 
4894 12/08/19 
2987  143 147* K 4.2 4.2 3.9 * 110* 112* CO2 28 29 30  
GLU 96 120* 142* BUN 40* 39* 34* CREA 3.08* 3.43* 3.46* CA 8.8 9.0 8.6 MG 2.3 2.2 2.3 ALB 2.6* 2.6* 2.9*  
SGOT 18 15 14* ALT 9* 7* 7* INR 1.4* 1.4* 1.5* Recent Labs 12/10/19 
0460 12/09/19 
1940 12/08/19 
8091 WBC 4.8 5.2 5.5 HGB 7.8* 7.9* 8.0*  
HCT 26.7* 26.9* 26.9*  
* 134* 146* No results found for: SDES Lab Results Component Value Date/Time  Culture result: NO GROWTH 4 DAYS 12/06/2019 11:23 PM  
 Culture result: HEAVY ENTEROCOCCUS SPECIES NOTED (A) 11/27/2019 11:10 AM  
 Culture result: LIGHT YEAST (A) 11/27/2019 11:10 AM  
 Culture result: NO GROWTH 5 DAYS 11/12/2019 11:18 AM  
 Culture result: SERRATIA MARCESCENS (A) 10/31/2019 10:05 AM  
 Culture result: SERRATIA MARCESCENS (2ND COLONY TYPE/STRAIN) (A) 10/31/2019 10:05 AM  
 Culture result: ENTEROCOCCUS FAECALIS GROUP D (A) 10/31/2019 10:05 AM  
 
Recent Results (from the past 24 hour(s)) GLUCOSE, POC Collection Time: 12/09/19 12:27 PM  
Result Value Ref Range Glucose (POC) 124 (H) 65 - 100 mg/dL Performed by Anuj Cunha GLUCOSE, POC Collection Time: 12/09/19  4:59 PM  
Result Value Ref Range Glucose (POC) 131 (H) 65 - 100 mg/dL Performed by [place], POC Collection Time: 12/09/19 10:19 PM  
Result Value Ref Range Glucose (POC) 144 (H) 65 - 100 mg/dL Performed by Tj Beckham METABOLIC PANEL, COMPREHENSIVE Collection Time: 12/10/19  4:12 AM  
Result Value Ref Range Sodium 143 136 - 145 mmol/L Potassium 4.2 3.5 - 5.1 mmol/L Chloride 109 (H) 97 - 108 mmol/L  
 CO2 28 21 - 32 mmol/L Anion gap 6 5 - 15 mmol/L Glucose 96 65 - 100 mg/dL BUN 40 (H) 6 - 20 MG/DL Creatinine 3.08 (H) 0.70 - 1.30 MG/DL  
 BUN/Creatinine ratio 13 12 - 20 GFR est AA 25 (L) >60 ml/min/1.73m2 GFR est non-AA 20 (L) >60 ml/min/1.73m2 Calcium 8.8 8.5 - 10.1 MG/DL Bilirubin, total 0.6 0.2 - 1.0 MG/DL  
 ALT (SGPT) 9 (L) 12 - 78 U/L  
 AST (SGOT) 18 15 - 37 U/L Alk. phosphatase 100 45 - 117 U/L Protein, total 6.6 6.4 - 8.2 g/dL Albumin 2.6 (L) 3.5 - 5.0 g/dL Globulin 4.0 2.0 - 4.0 g/dL A-G Ratio 0.7 (L) 1.1 - 2.2 MAGNESIUM Collection Time: 12/10/19  4:12 AM  
Result Value Ref Range Magnesium 2.3 1.6 - 2.4 mg/dL CBC W/O DIFF Collection Time: 12/10/19  4:12 AM  
Result Value Ref Range WBC 4.8 4.1 - 11.1 K/uL  
 RBC 2.63 (L) 4.10 - 5.70 M/uL HGB 7.8 (L) 12.1 - 17.0 g/dL HCT 26.7 (L) 36.6 - 50.3 % .5 (H) 80.0 - 99.0 FL  
 MCH 29.7 26.0 - 34.0 PG  
 MCHC 29.2 (L) 30.0 - 36.5 g/dL  
 RDW 17.9 (H) 11.5 - 14.5 % PLATELET 971 (L) 921 - 400 K/uL MPV 9.7 8.9 - 12.9 FL  
 NRBC 0.0 0  WBC ABSOLUTE NRBC 0.00 0.00 - 0.01 K/uL PROTHROMBIN TIME + INR Collection Time: 12/10/19  4:12 AM  
Result Value Ref Range INR 1.4 (H) 0.9 - 1.1 Prothrombin time 13.8 (H) 9.0 - 11.1 sec PTT Collection Time: 12/10/19  4:12 AM  
Result Value Ref Range aPTT 30.7 22.1 - 32.0 sec  
 aPTT, therapeutic range     58.0 - 77.0 SECS  
DIGOXIN Collection Time: 12/10/19  4:12 AM  
Result Value Ref Range Digoxin level 1.2 0.90 - 2.00 NG/ML  
LACTIC ACID Collection Time: 12/10/19  4:12 AM  
Result Value Ref Range Lactic acid 0.9 0.4 - 2.0 MMOL/L  
PROCALCITONIN Collection Time: 12/10/19  4:12 AM  
Result Value Ref Range Procalcitonin 0.09 ng/mL LD Collection Time: 12/10/19  4:12 AM  
Result Value Ref Range  (H) 85 - 241 U/L  
NT-PRO BNP Collection Time: 12/10/19  4:12 AM  
Result Value Ref Range NT pro-BNP 19,231 (H) <125 PG/ML  
POC G3 - PUL Collection Time: 12/10/19  8:21 AM  
Result Value Ref Range pH (POC) 7.431 7.35 - 7.45    
 pCO2 (POC) 39.0 35.0 - 45.0 MMHG  
 pO2 (POC) 144 (H) 80 - 100 MMHG  
 HCO3 (POC) 25.9 22 - 26 MMOL/L  
 sO2 (POC) 99 (H) 92 - 97 % Base excess (POC) 2 mmol/L Site RIGHT RADIAL Device: NASAL CANNULA Flow rate (POC) 4.5 L/M Allens test (POC) YES Specimen type (POC) ARTERIAL Total resp. rate 14 Terra Jara MD

## 2019-12-10 NOTE — PROGRESS NOTES
Speech Therapy Chart reviewed. Note plans for thoracentesis today. Will follow for SLP as appropriate. Delia Worley M.S., CCC-SLP

## 2019-12-10 NOTE — PROGRESS NOTES
08:00 - 09:45 (105) 13:00 - 14:30 (90) NYHA class IV A/C systolic heart failure Low EF (10%) Inotrope dependent Malnutrition  
LVAD Work-up Epistaxis Hematuria RV dysfunction Fluid overload 08:00 - less confused this am  
 
08:15 - still complains of dyspnea 08:30 - ABG this am looks good; 7.41 / 39 / 144 / 26 / + 2 
 
08:45 - small bilateral pleural effusions; going over issues with HF team; will get U/S today 09:00 - Hgb a little low; platelets a little low as well 
 
09:15 - creatinine remains in the 3's; bilirubin and other LFTs look good; LDH and lactic acid normal 
 
09:30 - Pro-calcitonin normal; NT pro-BNP about the same 13:00 - having some issues with double vision; eye patch ordered 13:15 - INR 1.4 ; re-started bivalirudin 13:30 - here for U/S; pre-albumin low; started on TF's 13:45 - looks to have some loculated effusion; going over issues with patient's wife 14:00 - going over issues with interventional radiology 14:15 - clear fluid on tap; repeat CXR in am  
 
CXR - bilateral effusions Visit Vitals /58 (BP 1 Location: Left arm, BP Patient Position: At rest;Sitting) Pulse 86 Temp 98 °F (36.7 °C) Resp 24 Ht 6' 2\" (1.88 m) Wt 194 lb 7.1 oz (88.2 kg) SpO2 94% BMI 24.97 kg/m² LVAD Pump Speed (RPM): 5570 Pump Flow (LPM): 4.7 MAP: 78 
PI (Pulsitility Index): 3.4 Power: 4.2  Test: Yes 
Back Up  at Bedside & Labeled: Yes Power Module Test: Yes Driveline Site Care: No 
Driveline Dressing: Clean, Dry, and Intact Outpatient: No 
MAP in Therapeutic Range (Outpatient): Yes Testing Alarms Reviewed: Yes 
Back up SC speed: 5600 Back up Low Speed Limit: 5200 Emergency Equipment with Patient?: Yes Emergency procedures reviewed?: Yes Drive line site inspected?: Yes Drive line intergrity inspected?: Yes Drive line dressing changed?: No 
 
 
Intake/Output Summary (Last 24 hours) at 12/10/2019 1347 Last data filed at 12/10/2019 1300 Gross per 24 hour Intake 3736.98 ml Output 1185 ml Net 2551.98 ml Risk of morbidity and mortality - high Medical decision making - high complexity Total critical care time - 195 minutes (CPT 13051, 99292 x 5)

## 2019-12-10 NOTE — PROGRESS NOTES
Problem: Mobility Impaired (Adult and Pediatric) Goal: *Acute Goals and Plan of Care (Insert Text) Description FUNCTIONAL STATUS PRIOR TO ADMISSION: Patient was modified independent using a single point cane for functional mobility. Patient reports an increasingly sedentary lifestyle 2* fatigue and SOB/dyspnea. Retired (so is his wife). Patient reports x 3 falls within the last couple of weeks. Patient is wearing either nasal cannula or CPAP at night. LVAD work-up has been initiated. HOME SUPPORT PRIOR TO ADMISSION: The patient lived with his wife, but did not require physical assistance. Physical Therapy Goals: 
 
Re-evaluation completed 11/20/2019 and new goals formulated following LVAD implantation. Reviewed and cont 12/3/2019 1. Patient will move from supine to sit and sit to supine, scoot up and down and roll side to side in bed with minimal assistance/contact guard assist within 7 days. 2.  Patient will perform sit to/from stand with minimal assistance/contact guard assist within 7 days. 3.  Patient will ambulate 150 feet with least restrictive assistive device and minimal assistance/contact guard assist within 7 days. 4.  Patient will ascend/descend 4 stairs with handrail(s) with minimal assistance/contact guard assist within 7 days. 5.  Patient will perform cardiac exercises per protocol with supervision within 7 days. 6.  Patient will verbally and functionally recall 3/3 sternal precautions within 7 days. 7.  Patient will perform power exchange for power module to/from battery with moderate assistance  within 7 days. Initiated 10/27/2019; updated 11/15/2019 1. Patient will move from supine to sit and sit to supine, scoot up and down and roll side to side in bed with independence within 7 days. 2.  Patient will perform sit to/from stand with supervision/set-up within 7 days.  
3.  Patient will ambulate 200 feet with least restrictive assistive device and supervision/set-up within 7 days. 4.  Patient will ascend/descend 4 stairs with  handrail(s) with supervision/set-up within 7 days for functional strengthening and community reintegration. Rosalinda Vines 5.  Patient will verbally and functionally recall 3/3 sternal precautions within 7 days in preparation for LVAD implantation. 6.  Patient will perform a mock power exchange for power module to/from battery with supervision/set-up within 7 days in preparation for LVAD implantation. 1.  Patient will move from supine to sit and sit to supine, scoot up and down and roll side to side in bed with independence within 7 days. 2.  Patient will perform sit to/from stand with supervision/set-up within 7 days. 3.  Patient will ambulate 150 feet with least restrictive assistive device and supervision/set-up within 7 days. 4.  Patient will ascend/descend 4 stairs with  handrail(s) with supervision/set-up within 7 days for functional strengthening and community reintegration. Rosalinda Vines 5.  Patient will verbally and functionally recall 3/3 sternal precautions within 7 days in preparation for LVAD implantation. 6.  Patient will perform a mock power exchange for power module to/from battery with supervision/set-up within 7 days in preparation for LVAD implantation. Outcome: Progressing Towards Goal 
PHYSICAL THERAPY TREATMENT Patient: Milagro Holley (75 y.o. male) Date: 12/10/2019 Diagnosis: [facility] [I50.9] <principal problem not specified> Procedure(s) (LRB): LEFT BRACHIAL THROMBECTOMY (Left) 15 Days Post-Op Precautions: Fall, Sternal(LVAD ) Chart, physical therapy assessment, plan of care and goals were reviewed. ASSESSMENT Patient continues with skilled PT services and is progressing towards goals. Patient received supine in bed, agreeable to therapy.  Great progress this session, as he was able to stand with CGA at best and participated in multiple transfers from various surfaces. Did exhibit quick onset of fatigue, requiring up to mod A to stand from chair after multiple repetitions. Progressed to ambulation short distance in his room to sink but impulsive with mobility, reaching out to steady self on counter. VSS on 4L NC. Current Level of Function Impacting Discharge (mobility/balance): up to mod A for transfers, min-mod Ax2 for gait x5 ft Other factors to consider for discharge: previously independent, new LVAD, long complex medical course PLAN : 
Patient continues to benefit from skilled intervention to address the above impairments. Continue treatment per established plan of care. to address goals. Recommendation for discharge: (in order for the patient to meet his/her long term goals) Therapy 3 hours per day 5-7 days per week This discharge recommendation: 
Has been made in collaboration with the attending provider and/or case management IF patient discharges home will need the following DME: to be determined (TBD) SUBJECTIVE:  
Patient stated 110.  his way of referring to wall power (electrical term?) OBJECTIVE DATA SUMMARY:  
Cardiac diagnosis intervention: The patient stated 3/3 sternal precautions when prompted. Reviewed the \"3 Ps\" with patient. The patient required min cues to maintain sternal precautions during functional activity. Critical Behavior: 
Neurologic State: Alert Orientation Level: Oriented to person, Oriented to place, Oriented to situation Cognition: Appropriate for age attention/concentration Safety/Judgement: Decreased insight into deficits, Decreased awareness of need for safety, Decreased awareness of need for assistance Functional Mobility Training: 
Bed Mobility: 
 Supine to Sit: Stand-by assistance Scooting: Stand-by assistance Transfers: 
Sit to Stand: Contact guard assistance; Moderate assistance Stand to Sit: Contact guard assistance;Minimum assistance Bed to Chair: Minimum assistance; Moderate assistance;Assist x2 Balance: 
Sitting: Intact Sitting - Static: Fair (occasional) Sitting - Dynamic: Fair (occasional) Standing: Impaired; With support Standing - Static: Good;Fair 
Standing - Dynamic : Fair;Poor Ambulation/Gait Training: 
Distance (ft): 5 Feet (ft) Assistive Device: Gait belt Ambulation - Level of Assistance: Minimal assistance; Moderate assistance;Assist x2 Gait Abnormalities: Decreased step clearance;Lurching;Path deviations Base of Support: Center of gravity altered Sit<>stand x5 from EOB with CGA Pain Rating: 
Denied pain Activity Tolerance:  
Good, requires frequent rest breaks, and observed SOB with activity Please refer to the flowsheet for vital signs taken during this treatment. After treatment patient left in no apparent distress:  
Call bell within reach and sitting on [facility]  
 
COMMUNICATION/COLLABORATION:  
The patients plan of care was discussed with: Occupational Therapist and Registered Nurse Claire Robertson, PT, DPT Time Calculation: 25 mins

## 2019-12-11 NOTE — DIABETES MGMT
[facility] Cardiac Surgery Note Follow Up Recommendations/ Comments: Consult received for medication recommendations. BG's in range 90 mg/dL - 135 mg/dL past 24 hours and has required no lispro correction Pt is s/p LVAD placement. Had brachial thrombectomy 11/26/19. Pt returned to CVICU 12/4/2019 due to respiratory distress. SLP and nutrition consults appreciated Noted TF Osmolite 1.5 changed to nocturnal 7PM - 7AM 
 
Observe BG response to TF. If results are > 180 mg/dL become persistently, may require NPH at onset of TF Current hospital DM medication: lispro correction scale, normal sensitivity Chart reviewed and initial evaluation complete on Sona Kiser. Patient is a 71 y.o. male with no prior hx. Recent A1c suggestive of new dx. DTC saw pt for education regarding new diagnosis on 11/27/2019. May need review closer to discharge A1c:  
Lab Results Component Value Date/Time Hemoglobin A1c 6.6 (H) 10/25/2019 02:56 PM  
              
Recent Glucose Results:  
Lab Results Component Value Date/Time GLU 97 12/11/2019 04:35 AM  
 GLUCPOC 107 (H) 12/11/2019 11:19 AM  
 GLUCPOC 112 (H) 12/11/2019 08:17 AM  
 GLUCPOC 132 (H) 12/11/2019 07:31 AM  
  
 
Lab Results Component Value Date/Time Creatinine 2.72 (H) 12/11/2019 04:35 AM  
 
Estimated Creatinine Clearance: 29.8 mL/min (A) (based on SCr of 2.72 mg/dL (H)). Active Orders Diet DIET FULL LIQUID 2 [place]/2 Mildly Thick; No Conc. Sweets PO intake:  
Patient Vitals for the past 72 hrs: 
 % Diet Eaten 12/11/19 0800 50 % 12/10/19 1800 75 % 12/09/19 1600 75 % 12/09/19 1300 100 % 12/08/19 1800 10 % Will continue to follow as needed. Thank you. Rafael Curry RN, CDE [facility] Time spent: 3 minutes

## 2019-12-11 NOTE — PROGRESS NOTES
NUTRITION COMPLETE ASSESSMENT 
 
RECOMMENDATIONS:  
1. Continue nocturnal feeds until consistently meeting 60% needs orally 2. Encourage oral intake with diet upgrades per SLP 
  - please make sure to thicken all liquids including Ensure shakes (2 pkts per shake) 3. Add imodium PRN with loose stools 
 - reduce mag-ox if able Interventions/Plan:  
Food/Nutrient Delivery:  (-) Commercial supplement(Ensure)     (continue EN) Assessment:  
Reason for Assessment: Reassessment Diet: NPO, puree, nectar-thick liquids Supplements: Ensure Enlive 3x/day Tube feeds: Osmolite 1.5 @ 90ml/hr x 12hrs (7p to 7a) + 30ml flush q4hr during feeds Nutritionally Significant Medications: [x] Reviewed & Includes: allopurinol, retacrit, SSI, Bhakti-Q, keppra, mag ox, protonix, zosyn, pericolace, carafate; angiomax drip PRN: artifical saliva PRN, zofran Patient Vitals for the past 168 hrs: 
 % Diet Eaten 12/11/19 1300 75 % 12/11/19 0800 50 % 12/10/19 1800 75 % 12/09/19 1600 75 % 12/09/19 1300 100 % 12/08/19 1800 10 % 12/08/19 1300 50 % 12/08/19 1100 75 % 12/07/19 1800 100 % 12/07/19 1200 100 % 12/07/19 0800 75 % Subjective: Spoke with RN. Objective: 
Pt admitted for CHF. PMHx: HTN, CKD, chronic systolic heart failure, depression, others noted. S/p removal of impella (placed 10/29) and LVAD placement on 11/18. Confusion improved some. Happy to see upgraded to puree with nectar-thick liquids today. Fair intake with meals, Ensure Enlive TID (1050kcal, 60g protein) if 100% consumed. DHT placed on 12/6 and transitioned to nocturnal feeds on 12/9 and tolerating well. Provides: 1080ml, 1620kcal, 68g protein. Meets 82% lower energy/protein needs with PO intake should be meeting nutrition needs. Hope that intake will continue to improve with diet upgrade. Severe wt loss over past 6 months. Severe muscle/fat wasting. Pt does meet criteria for severe malnutrition. Last 3 Recorded Weights in this Encounter 12/09/19 0708 12/10/19 7734 12/11/19 9100 Weight: 85.6 kg (188 lb 11.4 oz) 88.2 kg (194 lb 7.1 oz) 84.7 kg (186 lb 11.7 oz) Estimated Nutrition Needs:  
Kcals/day: 1970 Kcals/day(1970-2134kcal) Protein: 98 g(81-98g (1-1.2g/kg - JUAN J on CKD with malnutrition)) Fluid: 1970 ml(1ml/kcal or per renal/cardio) Based On: [place] Jeor(x 1.2-1.3) Weight Used: Actual wt(81.3kg) Pt expected to meet estimated nutrient needs:  [x]   Yes - with EN    []  No [] Unable to predict at this time Nutrition Diagnosis:  
1. Unintended weight loss(malnutrition) related to CHF vs depression vs malignancy as evidenced by >10% weight loss x 6 months; severe muscle/fat wasting 2. Inadequate oral intake related to poor appetite, AMS as evidenced by <75% meals consumed; supplemental EN via [place] [address] Goals:   
 EN meeting at least 80% needs until oral intake meetin 60% needs Monitoring & Evaluation: - Enteral/parenteral nutrition intake - Weight/weight change Previous Nutrition Goals Met:   Yes Previous Recommendations:    Yes 
 
Education & Discharge Needs: 
 [x] None Identified 
 [] Identified and addressed [x] Participated in care plan, discharge planning, and/or interdisciplinary rounds Cultural, Tenriism and ethnic food preferences identified: None Skin Integrity: []Intact  [x]Other: sternal wound Edema: []None [x]Other: trace Last BM: 12/11 - loose Food Allergies: [x]None []Other Diet Restrictions: Cultural/Nondenominational Preference(s): None Anthropometrics:   
Weight Loss Metrics 12/11/2019 10/25/2019 10/25/2019 10/25/2019 9/30/2019 9/18/2019 9/16/2019 Today's Wt 186 lb 11.7 oz - 193 lb 6.4 oz - 199 lb 14.4 oz 196 lb 199 lb BMI - 23.97 kg/m2 - 24.83 kg/m2 25.67 kg/m2 25.16 kg/m2 25.55 kg/m2 Weight Source: Standing scale (comment) Height: 6' 2\" (188 cm), Body mass index is 23.97 kg/m². IBW : 86.2 kg (190 lb), % IBW (Calculated): 96.32 % 
 ,   
 
Labs:   
Lab Results Component Value Date/Time Sodium 142 12/11/2019 04:35 AM  
 Potassium 4.1 12/11/2019 04:35 AM  
 Chloride 107 12/11/2019 04:35 AM  
 CO2 26 12/11/2019 04:35 AM  
 Glucose 97 12/11/2019 04:35 AM  
 BUN 38 (H) 12/11/2019 04:35 AM  
 Creatinine 2.72 (H) 12/11/2019 04:35 AM  
 Calcium 8.5 12/11/2019 04:35 AM  
 Magnesium 2.0 12/11/2019 04:35 AM  
 Phosphorus 3.3 11/27/2019 04:18 AM  
 Albumin 2.4 (L) 12/11/2019 04:35 AM  
 
Lab Results Component Value Date/Time Hemoglobin A1c 6.6 (H) 10/25/2019 02:56 PM  
 
 
Earnstine Crew, RD [address] [place] , Pager #[phone] or [phone]

## 2019-12-11 NOTE — PROGRESS NOTES
Problem: Mobility Impaired (Adult and Pediatric) Goal: *Acute Goals and Plan of Care (Insert Text) Description FUNCTIONAL STATUS PRIOR TO ADMISSION: Patient was modified independent using a single point cane for functional mobility. Patient reports an increasingly sedentary lifestyle 2* fatigue and SOB/dyspnea. Retired (so is his wife). Patient reports x 3 falls within the last couple of weeks. Patient is wearing either nasal cannula or CPAP at night. LVAD work-up has been initiated. HOME SUPPORT PRIOR TO ADMISSION: The patient lived with his wife, but did not require physical assistance. Physical Therapy Goals: 
 
Re-evaluation completed 11/20/2019 and new goals formulated following LVAD implantation. Reviewed and cont 12/3/2019 1. Patient will move from supine to sit and sit to supine, scoot up and down and roll side to side in bed with minimal assistance/contact guard assist within 7 days. 2.  Patient will perform sit to/from stand with minimal assistance/contact guard assist within 7 days. 3.  Patient will ambulate 150 feet with least restrictive assistive device and minimal assistance/contact guard assist within 7 days. 4.  Patient will ascend/descend 4 stairs with handrail(s) with minimal assistance/contact guard assist within 7 days. 5.  Patient will perform cardiac exercises per protocol with supervision within 7 days. 6.  Patient will verbally and functionally recall 3/3 sternal precautions within 7 days. 7.  Patient will perform power exchange for power module to/from battery with moderate assistance  within 7 days. Initiated 10/27/2019; updated 11/15/2019 1. Patient will move from supine to sit and sit to supine, scoot up and down and roll side to side in bed with independence within 7 days. 2.  Patient will perform sit to/from stand with supervision/set-up within 7 days.  
3.  Patient will ambulate 200 feet with least restrictive assistive device and supervision/set-up within 7 days. 4.  Patient will ascend/descend 4 stairs with  handrail(s) with supervision/set-up within 7 days for functional strengthening and community reintegration. Nubia Kelly 5.  Patient will verbally and functionally recall 3/3 sternal precautions within 7 days in preparation for LVAD implantation. 6.  Patient will perform a mock power exchange for power module to/from battery with supervision/set-up within 7 days in preparation for LVAD implantation. 1.  Patient will move from supine to sit and sit to supine, scoot up and down and roll side to side in bed with independence within 7 days. 2.  Patient will perform sit to/from stand with supervision/set-up within 7 days. 3.  Patient will ambulate 150 feet with least restrictive assistive device and supervision/set-up within 7 days. 4.  Patient will ascend/descend 4 stairs with  handrail(s) with supervision/set-up within 7 days for functional strengthening and community reintegration. Nubia Kelly 5.  Patient will verbally and functionally recall 3/3 sternal precautions within 7 days in preparation for LVAD implantation. 6.  Patient will perform a mock power exchange for power module to/from battery with supervision/set-up within 7 days in preparation for LVAD implantation. Outcome: Progressing Towards Goal 
 
PHYSICAL THERAPY TREATMENT Patient: Reid Baumann (75 y.o. male) Date: 12/11/2019 Diagnosis: Acute decompensated heart failure ([facility] [address] [I50.9] <principal problem not specified> Procedure(s) (LRB): LEFT BRACHIAL THROMBECTOMY (Left) 16 Days Post-Op Precautions: Fall, Sternal(LVAD ) Chart, physical therapy assessment, plan of care and goals were reviewed. ASSESSMENT Patient continues with skilled PT services and is progressing towards goals. Received in bedside chair and eager to attempt gait but immediately requested a commode upon standing.  He remains limited in standing endurance and was unable to clear his buttocks on his initial attempt. Each subsequent attempt required moderate assist x2, rocking for momentum, and cues to maintain precautions. No resting tremors observed this date but present in bilateral knees standing that increased in intensity as he fatigued quickly requiring a seat after ~45 seconds. Completed 3 standing trials until limited by fatigue and DOUGLAS. Will continue to progress mobility as his endurance and medical status will allow. Recommend discharge to IP rehab to maximize his functional gains when medically stable. Current Level of Function Impacting Discharge (mobility/balance): moderate x2 for sit to stand Other factors to consider for discharge: confused PLAN : 
Patient continues to benefit from skilled intervention to address the above impairments. Continue treatment per established plan of care. to address goals. Recommendation for discharge: (in order for the patient to meet his/her long term goals) Therapy 3 hours per day 5-7 days per week IF patient discharges home will need the following DME: to be determined (TBD) SUBJECTIVE:  
Patient stated Can we walk again today? I don't know how far I will go but I would like to try.  OBJECTIVE DATA SUMMARY:  
Critical Behavior: 
Neurologic State: Alert Orientation Level: Oriented X4 Cognition: Appropriate for age attention/concentration, Follows commands Safety/Judgement: Decreased insight into deficits, Decreased awareness of need for safety, Decreased awareness of need for assistance Functional Mobility Training: 
Bed Mobility: 
  
Supine to Sit: Stand-by assistance Transfers: 
Sit to Stand: Moderate assistance;Assist x2 Stand to Sit: Minimum assistance Bed to Chair: Moderate assistance;Assist x1;Additional time Balance: 
Sitting: Intact Sitting - Static: Fair (occasional) Sitting - Dynamic: Fair (occasional) Standing: Impaired; With support Standing - Static: Fair Standing - Dynamic : Fair Ambulation/Gait Training: 
Distance (ft): 5 Feet (ft) Assistive Device: Gait belt;Walker, rolling Ambulation - Level of Assistance: Minimal assistance Gait Abnormalities: Decreased step clearance; Path deviations Stairs: Therapeutic Exercises:  
Seated ankle pumps, LAQs, marching x10 Fair eccentric control and cues required for smooth motion Pain Rating: 
 
 
Activity Tolerance:  
Fair Please refer to the flowsheet for vital signs taken during this treatment. After treatment patient left in no apparent distress:  
Supine in bed and Call bell within reach COMMUNICATION/COLLABORATION:  
The patients plan of care was discussed with: Registered Nurse Lynda Edouard, PT, DPT Time Calculation: 36 mins

## 2019-12-11 NOTE — PROGRESS NOTES
ID Progress Note 2019 Subjective:  
 
Thoracentesis may have helped some Objective: Antibiotics: 1. Levaquin 2. Rifampin 3. Fluconazole 4. Vancomycin 5. Cefazolin 6. Zosyn Vitals:  
Visit Vitals /87 (BP 1 Location: Left arm, BP Patient Position: At rest;Sitting) Pulse 79 Temp 98.2 °F (36.8 °C) Resp 25 Ht 6' 2\" (1.88 m) Wt 84.7 kg (186 lb 11.7 oz) SpO2 95% BMI 23.97 kg/m² Tmax:  Temp (24hrs), Av.8 °F (36.6 °C), Min:97.5 °F (36.4 °C), Max:98.2 °F (36.8 °C) Exam:  Lungs:  clear to auscultation bilaterally Heart:  regular rate and rhythm Abdomen:  soft, non-tender. Bowel sounds normal. No masses,  no organomegaly Urine is clearing up Labs:     
Recent Labs 19 
4824 12/10/19 
1553 19 
4336 WBC 4.2 4.8 5.2 HGB 7.8* 7.8* 7.9*  
* 138* 134* BUN 38* 40* 39* CREA 2.72* 3.08* 3.43* SGOT 15 18 15 AP 94 100 94 TBILI 0.4 0.6 0.6 Cultures: No results found for: DANIELLE Lab Results Component Value Date/Time Culture result: NO GROWTH 5 DAYS 2019 11:23 PM  
 Culture result: HEAVY ENTEROCOCCUS SPECIES NOTED (A) 2019 11:10 AM  
 Culture result: LIGHT YEAST (A) 2019 11:10 AM  
 
 
Radiology:  
 
Line/Insert Date:        
 
Assessment:  
 
1. UTI--Serratia (2 biotypes) from culture and small amount of Enterococcus 2. CHF/cardiomyopathy 3. ?aspiration event(s) 4. Renal insufficiency Objective: 1. Continue current therapy Tobi Hollingsworth MD

## 2019-12-11 NOTE — PROGRESS NOTES
Bedside and Verbal shift change report given to [facility] (oncoming nurse) by Queta Lema (offgoing nurse). Report included the following information SBAR, ED Summary, OR Summary, Procedure Summary, Intake/Output, MAR, Recent Results, Med Rec Status, Cardiac Rhythm AV paced and Alarm Parameters . 0800: Patient up in beside chair eating honey thickened breakfast. Resting comfortably 1200: Patient back to bed after lunch. Heavy 2-3 assist. Now resting comfortably in bed 
1400: VAD alarm testing done with pt's wife at bedside. Wife educated and participated in alarm testing 1530: VAD dressing done by bedside RN Bedside and Verbal shift change report given to [address] [place] [address] (oncoming nurse) by [facility] (offgoing nurse). Report included the following information SBAR, OR Summary, Intake/Output, Cardiac Rhythm Paced and Alarm Parameters .

## 2019-12-11 NOTE — WOUND CARE
Wound Care Note: Follow-up visit for left thumb Chart shows: 
Admitted for acute decompensated heart failure s/p right axillary exploration with construction of 10 mm chimney graft used for introduction of a percutaneous left ventricular assist device with insertion of same 10/30/19 s/p cystoscopy 11/13/19 and left brachial artery thrombectomy 11/25/19 Past Medical History:  
Diagnosis Date  Degenerative disc disease, lumbar  Heart failure ([facility] [address]  High cholesterol  Hypertension  Paroxysmal atrial fibrillation ([facility] [address] 4/2/2019  Spinal stenosis WBC = 4.2 on 12/11/19 Assessment:  
Patient is alert and talking, incontinent Bed: [place] Diet: Full liquid 2 nectar/2 mildly thick; no conc. Sweets with nutritional supplements Patient reports no pain. Bilateral heels skin intact with blanchable erythema. Dependent rubor, patient up in chair. Buttock and sacrum were not assessed. 1. Left thumb is now crusted over, measures 1.2 cm x 1.1 cm x 0.1 cm, no drainage, david-wound intact without erythema. Left open to air. 2.  Left forearm with small crusted wound, approximately 0.3 cm x 0.3 cm, no drainage, david-wound without erythema. Left open to air. Recommendations: No wound care needed. Skin Care & Pressure Prevention: 
Minimize layers of linen/pads under patient to optimize support surface. Turn/reposition approximately every 2 hours and offload heels. Manage incontinence / promote continence Nourishing Skin Cream to dry skin, minimize use of briefs when able Discussed above plan with patient & Kg oMnroy RN Transition of Care: Wound care will sign off. JAME Tom, RN, [facility], [facility]. 
office [phone] 
pager [phone] or call  to page

## 2019-12-11 NOTE — PROGRESS NOTES
Problem: Self Care Deficits Care Plan (Adult) Goal: *Acute Goals and Plan of Care (Insert Text) Description FUNCTIONAL STATUS PRIOR TO ADMISSION: Patient was modified independent using a single point cane for functional mobility. Patient able to shower and dress himself. However, patient required assistance for household management from his wife. HOME SUPPORT: The patient lived alone with wife to provide assistance. Occupational Therapy Goals: OT weekly reassessment 12/6/19: goals modified 1. Patient will perform upper body dressing moderate assistance within 7 day(s). 2.  Patient will perform lower body dressing with moderate assistance within 7 day(s). 3.  Patient will perform grooming seated EOB with minimum assistance within 7 day(s). 4.  Patient will perform toilet transfers with minimum assistance within 7 day(s). 5.  Patient will perform all aspects of toileting with moderate assistance within 7 day(s). 6.  Patient will participate in upper extremity therapeutic exercise/activities with supervision/set-up-min A for 5 minutes within 7 day(s). 7.  Patient will utilize energy conservation techniques during functional activities with verbal cues within 7 day(s). 8. Patient will verbalize 3/5 LVAD components with min verbal cues within 7 day (s). OT weekly reassessment 11/29/19: goals modified below 1. Patient will perform upper body dressing including LVAD switchovers with moderate assistance within 7 day(s). 2.  Patient will perform lower body dressing with minimal assistance within 7 day(s). 3.  Patient will perform grooming in standing with minimum assistance within 7 day(s). 4.  Patient will perform toilet transfers with minimum assistance within 7 day(s). 5.  Patient will perform all aspects of toileting with minimal assistance within 7 day(s).  
6.  Patient will participate in upper extremity therapeutic exercise/activities with supervision/set-up for 5 minutes within 7 day(s). 7.  Patient will utilize energy conservation techniques during functional activities with verbal cues within 7 day(s). 8. Patient will verbalize LVAD terminology with verbal cues within 7 day (s). Goals reviewed and continued/modified as follows 11/20/19 s/p LVAD implantation 1. Patient will perform upper body dressing including LVAD switchovers with moderate assistance within 7 day(s). 2.  Patient will perform lower body dressing with minimal assistance within 7 day(s). 3.  Patient will perform grooming with supervision/setup within 7 day(s). 4.  Patient will perform toilet transfers supervision/setupmodified independence within 7 day(s). 5.  Patient will perform all aspects of toileting with minimal assistance within 7 day(s). 6.  Patient will participate in upper extremity therapeutic exercise/activities with supervision/set-up for 5 minutes within 7 day(s). 7.  Patient will utilize energy conservation techniques during functional activities with verbal cues within 7 day(s). 8. Patient will verbalize LVAD terminology with verbal cues within 7 day (s). Goals reviewed and continued/modified as follows 11/12/19 1. Patient will perform bathing with supervision/set-up within 7 day(s). 2.  Patient will perform lower body dressing with supervision/set-up within 7 day(s). 3.  Patient will perform grooming with modified independence within 7 day(s). 4.  Patient will perform toilet transfers with modified independence within 7 day(s). 5.  Patient will perform all aspects of toileting with supervision/set-up within 7 day(s). 6.  Patient will participate in upper extremity therapeutic exercise/activities with supervision/set-up for 5 minutes within 7 day(s). 7.  Patient will utilize energy conservation techniques during functional activities with verbal cues within 7 day(s). 8. Patient will verbalize LVAD terminology with verbal cues within 7 day (s) in preparation for implant. Continue all goals 10/30/19 post re-eval for Impella removal  
Initiated 10/28/2019 1. Patient will perform bathing with supervision/set-up within 7 day(s). 2.  Patient will perform lower body dressing with supervision/set-up within 7 day(s). 3.  Patient will perform grooming with modified independence within 7 day(s). 4.  Patient will perform toilet transfers with modified independence within 7 day(s). 5.  Patient will perform all aspects of toileting with supervision/set-up within 7 day(s). 6.  Patient will participate in upper extremity therapeutic exercise/activities with supervision/set-up for 5 minutes within 7 day(s). 7.  Patient will utilize energy conservation techniques during functional activities with verbal cues within 7 day(s). Outcome: Progressing Towards Goal 
 OCCUPATIONAL THERAPY TREATMENT Patient: Milagro Holley (75 y.o. male) Date: 12/11/2019 Diagnosis: Acute decompensated heart failure ([facility] [address] [I50.9] <principal problem not specified> Procedure(s) (LRB): LEFT BRACHIAL THROMBECTOMY (Left) 16 Days Post-Op Precautions: Fall, Sternal(LVAD ) Chart, occupational therapy assessment, plan of care, and goals were reviewed. ASSESSMENT Patient continues with skilled OT services and is progressing towards goals. Patient continues to present with impulsivity, impaired balance, decreased strength, decreased endurance, impaired coordination, visual perceptual deficits, and with decreased safety awareness. Patient's family members present today and receptive to education on eye patch and oculomotor exercises for increased safety with functional activity. Patient completed bed > chair transfer x MOD A x 1 due to impaired balance and close assistance for safety.  LVAD information not reviewed today due to patient drowsiness and decreased attention. Will follow up tomorrow. Current Level of Function Impacting Discharge (ADLs): MOD A functional transfers; SBA-CGA bed mobility Other factors to consider for discharge: LVAD HM III 
    
 
PLAN : 
Patient continues to benefit from skilled intervention to address the above impairments. Continue treatment per established plan of care. to address goals. Recommend with staff: OOB x 3 to chair with chair alarm Recommend next OT session: seated EOB ADLs Recommendation for discharge: (in order for the patient to meet his/her long term goals) Therapy 3 hours per day 5-7 days per week This discharge recommendation: 
Has been made in collaboration with the attending provider and/or case management IF patient discharges home will need the following DME: to be determined (TBD) SUBJECTIVE:  
Patient stated I am ready.  OBJECTIVE DATA SUMMARY:  
Cognitive/Behavioral Status: 
Neurologic State: Alert Orientation Level: Oriented X4 Cognition: Appropriate for age attention/concentration Perception: Appears intact Perseveration: No perseveration noted Safety/Judgement: Decreased insight into deficits; Decreased awareness of need for safety;Decreased awareness of need for assistance Functional Mobility and Transfers for ADLs: 
Bed Mobility: 
Supine to Sit: Stand-by assistance Transfers: 
Sit to Stand: Minimum assistance Bed to Chair: Moderate assistance;Assist x1;Additional time Balance: 
 Fair/Poor ADL Intervention: 
  
 
  
 
  
 
  
 
  
 
  
 
  
 
Cognitive Retraining Safety/Judgement: Decreased insight into deficits; Decreased awareness of need for safety;Decreased awareness of need for assistance Activity Tolerance:  
Fair (RR 20-25); unable to obtain O2 sats; patient on 4L NC O2 Please refer to the flowsheet for vital signs taken during this treatment. After treatment patient left in no apparent distress: Sitting in chair, Call bell within reach, and Caregiver / family present COMMUNICATION/COLLABORATION:  
The patients plan of care was discussed with: Physical Therapist and Registered Nurse Yury Jones Time Calculation: 23 mins

## 2019-12-11 NOTE — PROGRESS NOTES
[facility] 
 [address] [facility], [address] [facility] Phone: [phone]   [phone]   
  
Nephrology Progress Note Sona Kiser     1950     [id number] Date of Admission : 10/25/2019 
12/11/19 CC:  Follow up for JUAN J, CKD, Hyponatremia Assessment and Plan JUAN J on CKD: 
- likely ATN +/- overdiuresis - Cr improving 
- voiding ok 
- diurese PRN if CVP > 10 
- daily labs Hypokalemia  
- replete PRN Hypernatremia : 
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  
- unable to get CTA/MRA 
- per neuro - On Keppra 
  
Gross hematuria, BPH w/ retention: 
- off CBI pre VAD. cysto pre op showed catheter related Cystitis @ postr wall  
- neal had to be replaced due to urinary retention 
- flomax - on hold  
- keep neal for now until he is stable from CHF stand point Acute on Chronic HFrEF  
- EF 16-20%, NYHA Class IV , hx of VF arrest - s/p AICD 
- Impella insertion 10/29- removed 11/18  
- s/p LVAD placement on 11/18 Hoarseness, vocal cord paralysis, mediastinal adenopathy: 
- will need eventual PET/CT 
  
L arm clot s/p thrombectomy on 11/25 JOSE on CPAP  
  
PAF s/p DCCV 6/19 
  
Anemia: 
- from blood loss s/p multiple blood products - s/p IV iron,  Repeat iron studies ok 
- on PAVEL q7 d Serratia and Enteroccocus UTI: 
- completed abx Interval History:   
Seen and examined. Confused. BP stable. S/p thoracentesis. Cr improving. Review of Systems: UNABLE TO obtain ROS Current Medications:  
Current Facility-Administered Medications Medication Dose Route Frequency  pantoprazole (PROTONIX) tablet 40 mg  40 mg Oral ACB  bivalirudin (ANGIOMAX) 250 mg in 0.9% sodium chloride (MBP/ADV) 50 mL  0.02-2.5 mg/kg/hr IntraVENous TITRATE  piperacillin-tazobactam (ZOSYN) 3.375 g in 0.9% sodium chloride (MBP/ADV) 100 mL  3.375 g IntraVENous Q12H  aspirin chewable tablet 81 mg  81 mg Oral DAILY  levETIRAcetam (KEPPRA) 250 mg in 0.9% sodium chloride 100 mL IVPB  250 mg IntraVENous Q12H  
 0.9% sodium chloride infusion  3 mL/hr IntraVENous CONTINUOUS  
 albuterol-ipratropium (DUO-NEB) 2.5 MG-0.5 MG/3 ML  3 mL Nebulization Q4H RT  
 allopurinol (ZYLOPRIM) tablet 100 mg  100 mg Oral DAILY  sildenafil (REVATIO) 2 mg/mL oral suspension 20 mg  20 mg Oral Q8H  
 arformoterol (BROVANA) neb solution 15 mcg  15 mcg Nebulization BID RT And  
 budesonide (PULMICORT) 500 mcg/2 ml nebulizer suspension  500 mcg Nebulization BID RT  
 magnesium oxide (MAG-OX) tablet 400 mg  400 mg Oral DAILY  artificial saliva (MOUTH KOTE) 1 Spray  1 Spray Oral PRN  
 lactobac ac& pc-s.therm-b.anim (TIAN Q/RISAQUAD)  1 Cap Oral DAILY  oxyCODONE IR (ROXICODONE) tablet 5 mg  5 mg Oral Q6H PRN  
 balsam peru-castor oil (VENELEX) ointment   Topical TID  tamsulosin (FLOMAX) capsule 0.4 mg  0.4 mg Oral DAILY  insulin lispro (HUMALOG) injection   SubCUTAneous AC&HS  Warfarin MD/NP dosing   Other PRN  
 epoetin yvonne-epbx (RETACRIT) injection 20,000 Units  20,000 Units SubCUTAneous Q7D  
 sodium chloride (NS) flush 5-40 mL  5-40 mL IntraVENous Q8H  
 sodium chloride (NS) flush 5-40 mL  5-40 mL IntraVENous PRN  
 acetaminophen (TYLENOL) tablet 650 mg  650 mg Oral Q6H PRN  
 naloxone (NARCAN) injection 0.4 mg  0.4 mg IntraVENous PRN  
 ondansetron (ZOFRAN) injection 4 mg  4 mg IntraVENous Q4H PRN  
 senna-docusate (PERICOLACE) 8.6-50 mg per tablet 1 Tab  1 Tab Oral DAILY  bisacodyl (DULCOLAX) tablet 5 mg  5 mg Oral DAILY PRN  
 sucralfate (CARAFATE) tablet 1 g  1 g Oral AC&HS  influenza vaccine 2019-20 (6 mos+)(PF) (FLUARIX/FLULAVAL/FLUZONE QUAD) injection 0.5 mL  0.5 mL IntraMUSCular PRIOR TO DISCHARGE  hydrALAZINE (APRESOLINE) 20 mg/mL injection 20 mg  20 mg IntraVENous Q6H PRN  
 dextrose 10 % infusion 125-250 mL  125-250 mL IntraVENous PRN No Known Allergies Objective: 
Vitals:   
Vitals: 12/11/19 0500 12/11/19 0522 12/11/19 0600 12/11/19 0700 BP:      
Pulse: 72  72 72 Resp: 28  22 Temp:      
SpO2: 96% 95% 96% 98% Weight:      
Height:      
 
Intake and Output: 
No intake/output data recorded. 12/09 1901 - 12/11 0700 In: 3683.3 [P.O.:840; I.V.:803.3] Out: 2360 [Urine:2110] Physical Examination: 
 
General: In bed, restless legs Neck:  Lines + Resp:  Decreased bibasilar breath sounds CV:  VAD sounds; trace LE edema GI:  Soft and non-tender; no distension Neurologic:  Sleeping :  + neal; clear urine [x]    High complexity decision making was performed 
[x]    Patient is at high-risk of decompensation with multiple organ involvement Lab Data Personally Reviewed: I have reviewed all the pertinent labs, microbiology data and radiology studies during assessment. Recent Labs 12/11/19 
3515 12/10/19 
4008 12/09/19 
9277  143 143  
K 4.1 4.2 4.2  109* 110* CO2 26 28 29 GLU 97 96 120* BUN 38* 40* 39* CREA 2.72* 3.08* 3.43* CA 8.5 8.8 9.0 MG 2.0 2.3 2.2 ALB 2.4* 2.6* 2.6* SGOT 15 18 15 ALT 11* 9* 7* INR 2.4* 1.4* 1.4* Recent Labs 12/11/19 
9366 12/10/19 
2494 12/09/19 
3260 WBC 4.2 4.8 5.2 HGB 7.8* 7.8* 7.9*  
HCT 26.3* 26.7* 26.9*  
* 138* 134* No results found for: SDES Lab Results Component Value Date/Time Culture result: NO GROWTH 5 DAYS 12/06/2019 11:23 PM  
 Culture result: HEAVY ENTEROCOCCUS SPECIES NOTED (A) 11/27/2019 11:10 AM  
 Culture result: LIGHT YEAST (A) 11/27/2019 11:10 AM  
 Culture result: NO GROWTH 5 DAYS 11/12/2019 11:18 AM  
 Culture result: SERRATIA MARCESCENS (A) 10/31/2019 10:05 AM  
 Culture result: SERRATIA MARCESCENS (2ND COLONY TYPE/STRAIN) (A) 10/31/2019 10:05 AM  
 Culture result: ENTEROCOCCUS FAECALIS GROUP D (A) 10/31/2019 10:05 AM  
 
Recent Results (from the past 24 hour(s)) POC G3 - PUL Collection Time: 12/10/19  8:21 AM  
Result Value Ref Range pH (POC) 7.431 7.35 - 7.45    
 pCO2 (POC) 39.0 35.0 - 45.0 MMHG  
 pO2 (POC) 144 (H) 80 - 100 MMHG  
 HCO3 (POC) 25.9 22 - 26 MMOL/L  
 sO2 (POC) 99 (H) 92 - 97 % Base excess (POC) 2 mmol/L Site RIGHT RADIAL Device: NASAL CANNULA Flow rate (POC) 4.5 L/M Allens test (POC) YES Specimen type (POC) ARTERIAL Total resp. rate 14 GLUCOSE, POC Collection Time: 12/10/19  9:10 AM  
Result Value Ref Range Glucose (POC) 90 65 - 100 mg/dL Performed by Cosme Jones, POC Collection Time: 12/10/19 11:01 AM  
Result Value Ref Range Glucose (POC) 88 65 - 100 mg/dL Performed by Judith Galeano   
PTT Collection Time: 12/10/19 11:02 AM  
Result Value Ref Range aPTT 40.0 (H) 22.1 - 32.0 sec  
 aPTT, therapeutic range     58.0 - 77.0 SECS  
PTT Collection Time: 12/10/19  2:18 PM  
Result Value Ref Range aPTT 48.9 (H) 22.1 - 32.0 sec  
 aPTT, therapeutic range     58.0 - 77.0 SECS  
GLUCOSE, POC Collection Time: 12/10/19  4:20 PM  
Result Value Ref Range Glucose (POC) 88 65 - 100 mg/dL Performed by [facility]   
PTT Collection Time: 12/10/19  5:53 PM  
Result Value Ref Range aPTT 55.9 (H) 22.1 - 32.0 sec  
 aPTT, therapeutic range     58.0 - 77.0 SECS  
PTT Collection Time: 12/10/19  9:09 PM  
Result Value Ref Range aPTT 55.9 (H) 22.1 - 32.0 sec  
 aPTT, therapeutic range     58.0 - 77.0 SECS  
GLUCOSE, POC Collection Time: 12/10/19 10:22 PM  
Result Value Ref Range Glucose (POC) 135 (H) 65 - 100 mg/dL Performed by [facility]   
PTT Collection Time: 12/11/19 12:09 AM  
Result Value Ref Range aPTT 64.1 (H) 22.1 - 32.0 sec  
 aPTT, therapeutic range     58.0 - 77.0 SECS  
PTT Collection Time: 12/11/19  2:24 AM  
Result Value Ref Range aPTT 70.3 (H) 22.1 - 32.0 sec  
 aPTT, therapeutic range     58.0 - 02.8 SECS  
METABOLIC PANEL, COMPREHENSIVE Collection Time: 12/11/19  4:35 AM  
Result Value Ref Range Sodium 142 136 - 145 mmol/L Potassium 4.1 3.5 - 5.1 mmol/L Chloride 107 97 - 108 mmol/L  
 CO2 26 21 - 32 mmol/L Anion gap 9 5 - 15 mmol/L Glucose 97 65 - 100 mg/dL BUN 38 (H) 6 - 20 MG/DL Creatinine 2.72 (H) 0.70 - 1.30 MG/DL  
 BUN/Creatinine ratio 14 12 - 20 GFR est AA 28 (L) >60 ml/min/1.73m2 GFR est non-AA 23 (L) >60 ml/min/1.73m2 Calcium 8.5 8.5 - 10.1 MG/DL Bilirubin, total 0.4 0.2 - 1.0 MG/DL  
 ALT (SGPT) 11 (L) 12 - 78 U/L  
 AST (SGOT) 15 15 - 37 U/L Alk. phosphatase 94 45 - 117 U/L Protein, total 6.3 (L) 6.4 - 8.2 g/dL Albumin 2.4 (L) 3.5 - 5.0 g/dL Globulin 3.9 2.0 - 4.0 g/dL A-G Ratio 0.6 (L) 1.1 - 2.2 MAGNESIUM Collection Time: 12/11/19  4:35 AM  
Result Value Ref Range Magnesium 2.0 1.6 - 2.4 mg/dL CBC W/O DIFF Collection Time: 12/11/19  4:35 AM  
Result Value Ref Range WBC 4.2 4.1 - 11.1 K/uL  
 RBC 2.64 (L) 4.10 - 5.70 M/uL HGB 7.8 (L) 12.1 - 17.0 g/dL HCT 26.3 (L) 36.6 - 50.3 % MCV 99.6 (H) 80.0 - 99.0 FL  
 MCH 29.5 26.0 - 34.0 PG  
 MCHC 29.7 (L) 30.0 - 36.5 g/dL  
 RDW 17.5 (H) 11.5 - 14.5 % PLATELET 740 (L) 087 - 400 K/uL MPV 9.9 8.9 - 12.9 FL  
 NRBC 0.0 0  WBC ABSOLUTE NRBC 0.00 0.00 - 0.01 K/uL PROTHROMBIN TIME + INR Collection Time: 12/11/19  4:35 AM  
Result Value Ref Range INR 2.4 (H) 0.9 - 1.1 Prothrombin time 23.4 (H) 9.0 - 11.1 sec PTT Collection Time: 12/11/19  4:35 AM  
Result Value Ref Range aPTT 73.7 (H) 22.1 - 32.0 sec  
 aPTT, therapeutic range     58.0 - 77.0 SECS  
NT-PRO BNP Collection Time: 12/11/19  4:35 AM  
Result Value Ref Range NT pro-BNP 13,524 (H) <125 PG/ML  
DIGOXIN Collection Time: 12/11/19  4:35 AM  
Result Value Ref Range Digoxin level 1.0 0.90 - 2.00 NG/ML  
LD Collection Time: 12/11/19  4:35 AM  
Result Value Ref Range  85 - 241 U/L  
LACTIC ACID Collection Time: 12/11/19  4:35 AM  
Result Value Ref Range Lactic acid 1.1 0.4 - 2.0 MMOL/L  
PROCALCITONIN Collection Time: 12/11/19  4:35 AM  
Result Value Ref Range Procalcitonin 0.08 ng/mL Ines Paredes MD

## 2019-12-11 NOTE — PROGRESS NOTES
NYHA class IV A/C systolic heart failure Low EF (10%) Inotrope dependent Malnutrition  
LVAD Work-up Epistaxis Hematuria RV dysfunction Fluid overload Drainage about 30 cc over last shift Breathing much better today Hgb and platelets look good PTT therapeutic Creatinine continues to improve - likely got him a little too dry Bilirubin and other LFTs look good LDH and lactic acid look reasonable Pro-calcitonin normal 
 
Still some issues with double vision Went over issues with HF team  
 
CXR - small left pleural effusion Visit Vitals /87 (BP 1 Location: Left arm, BP Patient Position: At rest;Sitting) Pulse 73 Temp 98.2 °F (36.8 °C) Resp 27 Ht 6' 2\" (1.88 m) Wt 186 lb 11.7 oz (84.7 kg) SpO2 96% BMI 23.97 kg/m² LVAD Pump Speed (RPM): 6290 Pump Flow (LPM): 4.9 MAP: 72 
PI (Pulsitility Index): 2.6 Power: 4.2  Test: No 
Back Up  at Bedside & Labeled: Yes Power Module Test: No 
Driveline Site Care: No 
Driveline Dressing: Clean, Dry, and Intact Outpatient: No 
MAP in Therapeutic Range (Outpatient): Yes Testing Alarms Reviewed: Yes 
Back up SC speed: 5600 Back up Low Speed Limit: 5200 Emergency Equipment with Patient?: Yes Emergency procedures reviewed?: Yes Drive line site inspected?: No 
Drive line intergrity inspected?: Yes Drive line dressing changed?: No 
 
 
Intake/Output Summary (Last 24 hours) at 12/11/2019 1219 Last data filed at 12/11/2019 1200 Gross per 24 hour Intake 1670.64 ml Output 1805 ml Net -134.36 ml Risk of morbidity and mortality - high Medical decision making - high complexity Total critical care time - 30 minutes (CPT 35608)

## 2019-12-11 NOTE — PROGRESS NOTES
1945: Bedside and Verbal shift change report given to Nano Feliz RN (oncoming nurse) by Brina Caro RN and Roger Patterson RN (offgoing nurse). Report included the following information SBAR, OR Summary, Procedure Summary, Intake/Output, MAR, Recent Results, Cardiac Rhythm Paced and Alarm Parameters . Will redraw scheduled PTT at 2100 per report 2155: PTT results: 55.9, subtherapeutic, will increase bival dose by 1.1x and recheck PTT in 2 hours. 0045: PTT results: 64.1, therapeutic. Will recheck in 2 hours 0253: PTT results: 70.3, therapeutic. Recheck PTT in 12 hours 0745: Bedside and Verbal shift change report given to Brina Caro RN and Roger Patterson RN (oncoming nurse) by Nano Feliz RN (offgoing nurse). Report included the following information SBAR, ED Summary, OR Summary, Procedure Summary, Intake/Output, MAR, Recent Results, Cardiac Rhythm Paced and Alarm Parameters .

## 2019-12-11 NOTE — PROGRESS NOTES
Problem: Dysphagia (Adult) Goal: *Acute Goals and Plan of Care (Insert Text) Description Speech Pathology Re-Evaluation 12/11/19 1. Patient will tolerate pureed diet, nectar thickened liquids without overt s/s aspiration within 7 days. Re-evaluation 12/3/2019 1. Patient will tolerate least restrictive diet without adverse effects within 7 days. Discontinue Re-evaluation 11/26/2019 1. Patient will tolerate regular/thin liquid diet with no overt s/s aspiration within 7 days Re-evaluation 11/20/2019 1. Patient will tolerate mechanical soft/thin liquid diet with no overt s/s aspiration within 7 days. MET Initiated 10/28/19 1. Patient will tolerate regular diet/thin liquids without overt s/s of aspiration within 7 days MET 2. Patient will participate in [facility] for further objective assessment of swallow physiology within 7 days (once medically stable from Impella/cardiac sx standpoint) NOT MET; discontinue Outcome: Progressing Towards Goal 
 
SPEECH LANGUAGE PATHOLOGY DYSPHAGIA TREATMENT: WEEKLY REASSESSMENT Patient: Fahad Gibbs (75 y.o. male) Date: 12/11/2019 Diagnosis: Acute decompensated heart failure ([facility] [address] [I50.9] <principal problem not specified> Procedure(s) (LRB): LEFT BRACHIAL THROMBECTOMY (Left) 16 Days Post-Op Precautions: Aspiration Fall, Sternal(LVAD ) ASSESSMENT: 
Patient continues to present with moderate pharyngeal dysphagia characterized by pharyngeal swallow delay and reduced laryngeal elevation via palpation. Swallow skills at bedside consistent with MBS which was completed on 12/3/19. This date patient demonstrated delayed throat clear with large gulp of nectar liquids x 2. This occurred once after belching. This was eliminated with small sips. No overt s/s aspiration with pureed consistency. Patient declined solid trials stating \"that's too dry. \"  Patient remains at risk for aspiration given dysphagia, generalized weakness and left VC paralysis. Given bedside presentation feel patient is safe for pureed diet, nectar thickened liquids. Patient's progression toward goals since last assessment: Tolerating nectar thickened liquids. PLAN: 
Goals have been updated based on progression since last assessment. Patient continues to benefit from skilled intervention to address the above impairments. Continue to follow the patient 3 times a week to address goals. Recommendations and Planned Interventions: 
Pureed diet Nectar thickened liquids Small sips (may offer single sips in medicine cup if patient unable to take small sips) No straws Sit up for all meals Discharge Recommendations: To Be Determined SUBJECTIVE:  
Patient stated Can I get a Pepsi like this (indicating thickened)? Mitch Lees  RN reports coughing with breakfast. 
 
OBJECTIVE:  
Cognitive and Communication Status: 
Neurologic State: Alert Orientation Level: Oriented X4 Cognition: Appropriate for age attention/concentration, Follows commands Perception: Appears intact Perseveration: No perseveration noted Safety/Judgement: Decreased insight into deficits, Decreased awareness of need for safety, Decreased awareness of need for assistance Dysphagia Treatment: 
Oral Assessment: 
Oral Assessment Labial: No impairment Dentition: Upper & lower dentures Oral Hygiene: Moist oral mucosa Lingual: No impairment Velum: No impairment Mandible: No impairment P.O. Trials: 
Patient Position: Up in chair Vocal quality prior to P.O.: Hoarse;Low volume Consistency Presented: Nectar thick liquid;Puree How Presented: Self-fed/presented;Spoon;Cup/sip;Cup/gulp Bolus Acceptance: No impairment Bolus Formation/Control: No impairment Propulsion: No impairment Oral Residue: None Initiation of Swallow: Delayed (# of seconds) Laryngeal Elevation: Decreased Aspiration Signs/Symptoms: Delayed cough/throat clear Effective Modifications: Small sips and bites Oral Phase Severity: No impairment Pharyngeal Phase Severity : Moderate After treatment patient left in no apparent distress:  
Patient left in no apparent distress sitting up in chair, Call bell within reach, Nursing notified, and Caregiver / family present COMMUNICATION/EDUCATION:  
Patient was educated regarding role of SLP, diet, risk of aspiration and POC. The patients plan of care including recommendations, planned interventions, and recommended diet changes were discussed with: Registered Nurse. NICO Garcia Time Calculation: 15 mins

## 2019-12-11 NOTE — PROGRESS NOTES
[address] [facility] [address] [facility] [place], [place] Inpatient Progress Note Patient name: Ciro Bradford Patient : 1950 Patient MRN: [id number] Attending MD: Catalina Chamorro MD 
Date of service: 19 Chief Complaint:  
Breanne Curtis azucena LVAD implant 
  
HPI: Sona Kiser is a 71y.o. year old pleasant white male with a history of HTN, HLD, JOSE, CAD s/p cardiac arrest VFib s/p CABG () c/b sternal would infection and sternectomy, ischemic cardiomyopathy LVEF 15-20%, s/p ICD and with LBBB. He was admitted with acute on chronic systolic heart failure with massive volume overload > 20 lbs, in the setting of atrial fibrillation s/p failed DCCV and underwent DCCV and RHC on .  S/p BiVICD on 19 with Dr. Gerhard Mckinney. He was discharged home home on IV milrinone on 19. [facility] has been followed closely by Dr. Philip Lam and the [facility]. 
  
Mr. Kiser was admitted for acute on chronic systolic heart failure. He underwent implantation of Impella 5.0 due to CS and has completed his LVAD evaluation. [facility] meets criteria for implant of HM3 as DT. He was NYHA Class IV prior to implant of Impella 5.0, has LVEF < 15%, was intolerant of GDMT due to symptomatic hypotension and renal dysfunction. He remains dependent on temporary MCS support. RHC  revealed compensated hemodynamics on Impella. His renal function has improved dramatically with improvement in his hemodynamics. He is not a suitable transplant candidate due to single kidney, sternectomy, debility, and frailty. He was reviewed by [address] [place] [address] Drive and felt to be a high risk heart transplant candidate due to multiple co-morbidities as well as the afore mentioned conditions.  He remained in the CCU and underwent a HM 3 implant as DT on . He was weaned off of pressors and transferred to [place] [address] where he continues to undergo PT/OT and hemodynamic optimization.   
  
24Hr Events PA cath removed CVP 3-5 mmHg Right pigtail cath placed with drainage of 170 ml of serous fluid Renal function improving daily Continues to complain of dyspnea INR 2.4 Procedure:  Procedure(s): REMOVAL TEMPORARY LEFT VENTRICULAR ASSIST DEVICE, IMPLANTATION OF LEFT VENTRICULAR ASSIST DEVICE PERMANENT (VAD), ECC, JACQUE, EPI AORTIC US BY DR Sophia Fallon.    
POD:  22 
  
Impression / Plan:  
Heart Failure Status: NYHA Class IV Chronic systolic heart failure due to ICM, NYHA Class IV, EF < 15%, cardiogenic shock bridged with Impella 5.0 support to HM 3 implant S/p Impella removal and LVAD implant 11/18/19 Continue RPMs at 5600 - LVIDd down to 5.3 cm  
TTE with bubble study negative for PFO Repeat TTE Transduce CVP via PICC and attempt daily CardioMEMs readings (limited by electrical interference) Stable off of milrinone Bumex 2 mg IV PRN CVP > 10 mmHg - management per nephrology Cont Sildenafil 20 mg PO TID - rx sent to local pharmacy to initiate PA Intolerant of BB due to RV failure Intolerant of ACEi/ARB/ARNI/AA due to JUAN J on CKD 3 Strict I/O, daily weights, Na+ restricted diet Continue LVAD education Daily dressing changes until POD 30 Generalized myoclonus Improved Appreciate Neurology input Continue Keppra with renal dosing Head CT negative for acute process EEG suggestive of mild generalized encephalopathic process, possibly related to underlying structural brain injury vs metabolic abnormality Monitor closely Remain in CVICU 
  
Anticoagulation for LVAD, INR goal 2-3 Continue ASA INR 2.4 - received Vit K over the weekend 2 mg warfarin tonight Discontinue bivalirudin 
  
Acute Respiratory Failure post op Improved with aggressive diuresis, but still c/o dyspnea CXR and O2 requirement improved ABG acceptable TTE with Bubble study negative for PFO Pulmonary Consult appreciated Pulmonary hygiene Cont home CPAP- must use while sleeping Thoracentesis with pigtail cath  
  
Post op Pain PRN Tylenol Comfort measures  
  
L Brachial Artery Thrombosis s/p thrombectomy Surgical management per Dr. Salinas Safe Pain improved  
  
Acute on CKD 3, atrophic left kidney Creatinine stable today Appreciate Nephrology assistance Watch Cr - improving Bumex PRN CVP > 10 mmHg Avoid nephrotoxins, trend labs Renally dose meds  
  
CAD s/p CABG Cont low dose ASA- watch platelets No BB d/t RV failure Hold statin due to recent hepatic failure LHC performed 11/13 - low likelihood of benefit from revascularization  
  
Hx of VF arrest s/p BiV-ICD No recent shocks Keep K > 4 and Mg > 2  
   
PAF In Afib today Hold digoxin - level 1.2 today - do not resume until < 1 Repeat TFTs WNL 
  
Hx of gout Uric acid WNL 
  
Suspected aspiration pneumonia RUL consolidation Suspect aspiration related to over sedation Sputum culture showing scant growth of yeast 
Continue Zosyn with renal dosing Urinary retention, c/b serratia UTI and hematuria Appreciate Urology consult Cystoscopy performed 11/13 shows catheter induced cystitis Repeat UA shows resolution of UTI  
  
Malnutrition Appreciate Nutritionist consult Prealbumin weekly Continue full liquid diet with nectar thick liquids Cont TF via dobhoff - at goal  
Intolerant of mirtazapine d/t tremors, confusion  
  
COPD Appreciate Pulmonology assistance Continue nebs PRN   
  
Anemia Multifactorial, due to hemolysis + epistaxis + hematuria Intolerant of octreotide or doxycycline for AVM ppx due to prolonged QTc Transfuse for Hgb goal > 8 
  
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  
  
Hx of sternal wound infection, sternectomy Sternum closed post op- monitor  
  
Vocal cord paralysis Improved ENT recs appreciated Neck CT- R neck edema, no airway compromise Speech therapy following - appreciate input 
  
Debility Continue PT/OT  
  
Ppx Protonix PT/OT Bowel regimen Continue full liquid diet with nectar thick liquids PICC placed 
  
Dispo: 
Likely will need IP rehab. Remain in CVICU for now. Thank you for allowing us to participate in your patient's care. Mounika Kamara MD, [facility] Chief of Cardiology, [facility] Medical Director Calos Rueda [address], [address] Office [phone] Fax [phone] LVAD INTERROGATION: 
Device interrogated in person Device function normal, normal flow, no events LVAD Pump Speed (RPM): 1492 Pump Flow (LPM): 4.6 MAP: 72 
PI (Pulsitility Index): 3.4 Power: 4.2  Test: No 
Back Up  at Bedside & Labeled: Yes Power Module Test: No 
Driveline Site Care: No 
Driveline Dressing: Clean, Dry, and Intact Outpatient: No 
MAP in Therapeutic Range (Outpatient): Yes Testing Alarms Reviewed: Yes 
Back up SC speed: 5600 Back up Low Speed Limit: 5200 Emergency Equipment with Patient?: Yes Emergency procedures reviewed?: Yes Drive line site inspected?: No 
Drive line intergrity inspected?: Yes Drive line dressing changed?: No 
 
PHYSICAL EXAM: 
Visit Vitals /87 (BP 1 Location: Left arm, BP Patient Position: At rest;Sitting) Pulse 78 Temp 98.2 °F (36.8 °C) Resp 24 Ht 6' 2\" (1.88 m) Wt 186 lb 11.7 oz (84.7 kg) SpO2 96% BMI 23.97 kg/m² Hemodynamics: 
 CVP: CVP (mmHg): 10 mmHg (12/11/19 1400) Physical Exam  
Constitutional: He appears well-developed. He appears lethargic. He appears cachectic. No distress. More fatigued today HENT:  
Head: Normocephalic. Neck: Normal range of motion. Neck supple. No JVD present. Cardiovascular: Regular rhythm. + VAD hum Pulmonary/Chest: Effort normal and breath sounds normal. No respiratory distress. Abdominal: Soft. Bowel sounds are normal. He exhibits no distension. Dobhoff in place Musculoskeletal: Normal range of motion. General: No edema. Neurological: He appears lethargic. Skin: Skin is warm and dry. Nursing note and vitals reviewed. REVIEW OF SYSTEMS: 
Review of Systems Constitutional: Positive for malaise/fatigue. Negative for chills and fever. HENT: Positive for hearing loss. Respiratory: Positive for shortness of breath. Negative for cough. Mild dyspnea Cardiovascular: Negative for chest pain, palpitations, orthopnea and leg swelling. Gastrointestinal: Negative for heartburn, nausea and vomiting. Genitourinary: Negative. Musculoskeletal: Negative. Neurological: Positive for weakness. Negative for dizziness and headaches. Endo/Heme/Allergies: Negative. PAST MEDICAL HISTORY: 
Past Medical History:  
Diagnosis Date  Degenerative disc disease, lumbar  Heart failure ([facility]  High cholesterol  Hypertension  Paroxysmal atrial fibrillation ([facility] 4/2/2019  Spinal stenosis PAST SURGICAL HISTORY: 
Past Surgical History:  
Procedure Laterality Date  COLONOSCOPY Left 11/11/2019 COLONOSCOPY at bedside performed by Radha Minor MD at [place] [address]  HX CORONARY ARTERY BYPASS GRAFT    
 triple  HX HERNIA REPAIR    
 HX IMPLANTABLE CARDIOVERTER DEFIBRILLATOR  VT CARDIOVERSION ELECTIVE ARRHYTHMIA EXTERNAL N/A 6/10/2019 EP CARDIOVERSION performed by Za Iraheta MD at [place] [address], [address] CATH LAB  VT CARDIOVERSION ELECTIVE ARRHYTHMIA EXTERNAL N/A 6/18/2019 EP CARDIOVERSION performed by Dipesh Villalba MD at [place] [address], [address] CATH LAB  VT INSJ/RPLCMT PERM DFB W/TRNSVNS LDS 1/DUAL CHMBR N/A 6/21/2019 INSERT ICD BIV MULTI performed by Dipesh Segovia MD at [place] [address], [address] CATH LAB  VT TCAT IMPL WRLS P-ART PRS SNR L-T HEMODYN MNTR N/A 9/18/2019 IMPLANT HEART FAILURE MONITORING DEVICE performed by Eder Murphy MD at [place] [address], [address] CATH LAB FAMILY HISTORY: 
 Family History Problem Relation Age of Onset  Lung Disease Mother  Hypertension Mother Judietbar Namita Arthritis-osteo Mother  Heart Disease Mother  Heart Disease Father  Diabetes Father SOCIAL HISTORY: 
Social History Socioeconomic History  Marital status:  Spouse name: Not on file  Number of children: Not on file  Years of education: Not on file  Highest education level: Not on file Tobacco Use  Smoking status: Former Smoker Last attempt to quit: 2010 Years since quittin.0  Smokeless tobacco: Never Used Substance and Sexual Activity  Alcohol use: Not Currently Comment: rarely  Drug use: Never Other Topics Concern LABORATORY RESULTS: 
  
Labs Latest Ref Rng & Units 2019 12/10/2019 2019 2019 2019 2019 2019 WBC 4.1 - 11.1 K/uL 4.2 4.8 5.2 5.5 5.1 5.1 -  
RBC 4.10 - 5.70 M/uL 2.64(L) 2.63(L) 2.67(L) 2.68(L) 2.85(L) 2.79(L) - Hemoglobin 12.1 - 17.0 g/dL 7. 8(L) 7. 8(L) 7. 9(L) 8.0(L) 8.4(L) 8. 3(L) - Hematocrit 36.6 - 50.3 % 26. 3(L) 26. 7(L) 26. 9(L) 26. 9(L) 28. 3(L) 27. 9(L) -  
MCV 80.0 - 99.0 FL 99. 6(H) 101. 5(H) 100. 7(H) 100. 4(H) 99. 3(H) 100. 0(H) -  
Platelets 309 - 803 K/uL 131(L) 138(L) 134(L) 146(L) 174 201 - Lymphocytes 12 - 49 % - - - - - - - Monocytes 5 - 13 % - - - - - - - Eosinophils 0 - 7 % - - - - - - - Basophils 0 - 1 % - - - - - - - Albumin 3.5 - 5.0 g/dL 2. 4(L) 2. 6(L) 2. 6(L) 2. 9(L) 3. 1(L) 3. 2(L) -  
Calcium 8.5 - 10.1 MG/DL 8.5 8.8 9.0 8.6 9.4 9.3 9.2 SGOT 15 - 37 U/L 15 18 15 14(L) 12(L) 14(L) -  
Glucose 65 - 100 mg/dL 97 96 120(H) 142(H) 145(H) 98 136(H) BUN 6 - 20 MG/DL 38(H) 40(H) 39(H) 34(H) 29(H) 24(H) 23(H) Creatinine 0.70 - 1.30 MG/DL 2.72(H) 3.08(H) 3.43(H) 3.46(H) 3.01(H) 2.38(H) 2.05(H) Sodium 136 - 145 mmol/L 142 143 143 147(H) 147(H) 143 141 Potassium 3.5 - 5.1 mmol/L 4.1 4.2 4.2 3.9 3.2(L) 3.9 3.7 TSH 0.36 - 3.74 uIU/mL - - - - - - -  
 PSA 0.01 - 4.0 ng/mL - - - - - - -  
LDH 85 - 241 U/L 240 257(H) 257(H) 247(H) 250(H) 234 -  
CEA ng/mL - - - - - - - Some recent data might be hidden Lab Results Component Value Date/Time TSH 2.30 12/03/2019 03:29 AM  
 TSH 2.40 10/25/2019 07:39 PM  
 TSH 2.45 06/01/2019 04:16 AM  
 
 
ALLERGY: 
No Known Allergies CURRENT MEDICATIONS: 
Current Facility-Administered Medications Medication Dose Route Frequency  piperacillin-tazobactam (ZOSYN) 3.375 g in 0.9% sodium chloride (MBP/ADV) 100 mL  3.375 g IntraVENous Q8H  
 levETIRAcetam (KEPPRA) oral solution 250 mg  250 mg Oral Q12H  pantoprazole (PROTONIX) tablet 40 mg  40 mg Oral ACB  bivalirudin (ANGIOMAX) 250 mg in 0.9% sodium chloride (MBP/ADV) 50 mL  0.02-2.5 mg/kg/hr IntraVENous TITRATE  aspirin chewable tablet 81 mg  81 mg Oral DAILY  0.9% sodium chloride infusion  3 mL/hr IntraVENous CONTINUOUS  
 albuterol-ipratropium (DUO-NEB) 2.5 MG-0.5 MG/3 ML  3 mL Nebulization Q4H RT  
 allopurinol (ZYLOPRIM) tablet 100 mg  100 mg Oral DAILY  sildenafil (REVATIO) 2 mg/mL oral suspension 20 mg  20 mg Oral Q8H  
 arformoterol (BROVANA) neb solution 15 mcg  15 mcg Nebulization BID RT And  
 budesonide (PULMICORT) 500 mcg/2 ml nebulizer suspension  500 mcg Nebulization BID RT  
 magnesium oxide (MAG-OX) tablet 400 mg  400 mg Oral DAILY  artificial saliva (MOUTH KOTE) 1 Spray  1 Spray Oral PRN  
 lactobac ac& pc-s.therm-b.anim (TIAN Q/RISAQUAD)  1 Cap Oral DAILY  oxyCODONE IR (ROXICODONE) tablet 5 mg  5 mg Oral Q6H PRN  
 balsam peru-castor oil (VENELEX) ointment   Topical TID  tamsulosin (FLOMAX) capsule 0.4 mg  0.4 mg Oral DAILY  insulin lispro (HUMALOG) injection   SubCUTAneous AC&HS  Warfarin MD/NP dosing   Other PRN  
 epoetin yvonne-epbx (RETACRIT) injection 20,000 Units  20,000 Units SubCUTAneous Q7D  
 sodium chloride (NS) flush 5-40 mL  5-40 mL IntraVENous Q8H  
  sodium chloride (NS) flush 5-40 mL  5-40 mL IntraVENous PRN  
 acetaminophen (TYLENOL) tablet 650 mg  650 mg Oral Q6H PRN  
 naloxone (NARCAN) injection 0.4 mg  0.4 mg IntraVENous PRN  
 ondansetron (ZOFRAN) injection 4 mg  4 mg IntraVENous Q4H PRN  
 senna-docusate (PERICOLACE) 8.6-50 mg per tablet 1 Tab  1 Tab Oral DAILY  bisacodyl (DULCOLAX) tablet 5 mg  5 mg Oral DAILY PRN  
 sucralfate (CARAFATE) tablet 1 g  1 g Oral AC&HS  influenza vaccine 2019-20 (6 mos+)(PF) (FLUARIX/FLULAVAL/FLUZONE QUAD) injection 0.5 mL  0.5 mL IntraMUSCular PRIOR TO DISCHARGE  hydrALAZINE (APRESOLINE) 20 mg/mL injection 20 mg  20 mg IntraVENous Q6H PRN  
 dextrose 10 % infusion 125-250 mL  125-250 mL IntraVENous PRN Thank you for allowing me to participate in this patient's care. MD Calos Ventura [address] [place] [address], [address] Phone: [phone] Fax: [phone]

## 2019-12-11 NOTE — PROGRESS NOTES
Day #8 of Zosyn Indication:  aspiration PNA ppx Current regimen:  3.375 gram Q12H Abx regimen: Zosyn Recent Labs 12/11/19 
2029 12/10/19 
5817 19 
0575 WBC 4.2 4.8 5.2 CREA 2.72* 3.08* 3.43* BUN 38* 40* 39* Est CrCl: >20  ml/min; UO: 0.7 ml/kg/hr Temp (24hrs), Av.8 °F (36.6 °C), Min:97.5 °F (36.4 °C), Max:98.1 °F (36.7 °C) Cultures:  
 blood, NG, final 
 
Plan: Change to 3.375 gm q8hr

## 2019-12-12 NOTE — PROGRESS NOTES
2000: Received report from Stefano Kerr RN and Evy Roman, [facility]. Drip dual verified. 0130: Dr. Doris Jorgensen on telephone, inquiring about coumadin dose from 12/11. Orders received to give 2 mg coumadin now. 0700: Bivalrudin stopped per Dr. Doris Jorgensen order, INR 2.4.  
 
0720: 2 mg bumex given per Dr. Nirav Casillas, CVP 8-11 overnight. Pt increased WOB overnight, updated on this information. Orders received to decrease Q4 125 ml water flush to Q8. 
 
0800: Bedside and Verbal shift change report given to Arturo Hubbard RN (oncoming nurse) by Trevon Daugherty RN (offgoing nurse). Report included the following information SBAR, OR Summary, Procedure Summary, Intake/Output, Accordion, Recent Results, Med Rec Status and Cardiac Rhythm Paced.

## 2019-12-12 NOTE — PROGRESS NOTES
Problem: Mobility Impaired (Adult and Pediatric) Goal: *Acute Goals and Plan of Care (Insert Text) Description FUNCTIONAL STATUS PRIOR TO ADMISSION: Patient was modified independent using a single point cane for functional mobility. Patient reports an increasingly sedentary lifestyle 2* fatigue and SOB/dyspnea. Retired (so is his wife). Patient reports x 3 falls within the last couple of weeks. Patient is wearing either nasal cannula or CPAP at night. LVAD work-up has been initiated. HOME SUPPORT PRIOR TO ADMISSION: The patient lived with his wife, but did not require physical assistance. Physical Therapy Goals: 
 
Re-evaluation completed 11/20/2019 and new goals formulated following LVAD implantation. Reviewed and cont 12/3/2019. Reviewed and continued 12/12/2019 1. Patient will move from supine to sit and sit to supine, scoot up and down and roll side to side in bed with minimal assistance/contact guard assist within 7 days. 2.  Patient will perform sit to/from stand with minimal assistance/contact guard assist within 7 days. 3.  Patient will ambulate 150 feet with least restrictive assistive device and minimal assistance/contact guard assist within 7 days. 4.  Patient will ascend/descend 4 stairs with handrail(s) with minimal assistance/contact guard assist within 7 days. 5.  Patient will perform cardiac exercises per protocol with supervision within 7 days. 6.  Patient will verbally and functionally recall 3/3 sternal precautions within 7 days. 7.  Patient will perform power exchange for power module to/from battery with moderate assistance  within 7 days. Initiated 10/27/2019; updated 11/15/2019 1. Patient will move from supine to sit and sit to supine, scoot up and down and roll side to side in bed with independence within 7 days. 2.  Patient will perform sit to/from stand with supervision/set-up within 7 days. 3.  Patient will ambulate 200 feet with least restrictive assistive device and supervision/set-up within 7 days. 4.  Patient will ascend/descend 4 stairs with  handrail(s) with supervision/set-up within 7 days for functional strengthening and community reintegration. Lilia Tomas 5.  Patient will verbally and functionally recall 3/3 sternal precautions within 7 days in preparation for LVAD implantation. 6.  Patient will perform a mock power exchange for power module to/from battery with supervision/set-up within 7 days in preparation for LVAD implantation. 1.  Patient will move from supine to sit and sit to supine, scoot up and down and roll side to side in bed with independence within 7 days. 2.  Patient will perform sit to/from stand with supervision/set-up within 7 days. 3.  Patient will ambulate 150 feet with least restrictive assistive device and supervision/set-up within 7 days. 4.  Patient will ascend/descend 4 stairs with  handrail(s) with supervision/set-up within 7 days for functional strengthening and community reintegration. Lilia Tomas 5.  Patient will verbally and functionally recall 3/3 sternal precautions within 7 days in preparation for LVAD implantation. 6.  Patient will perform a mock power exchange for power module to/from battery with supervision/set-up within 7 days in preparation for LVAD implantation. Outcome: Progressing Towards Goal 
 
PHYSICAL THERAPY TREATMENT: WEEKLY REASSESSMENT Patient: Zay Wheeler (75 y.o. male) Date: 12/12/2019 Primary Diagnosis: Acute decompensated heart failure ([facility] [address] [I50.9] Procedure(s) (LRB): LEFT BRACHIAL THROMBECTOMY (Left) 17 Days Post-Op Precautions:  Fall, Sternal(LVAD ) ASSESSMENT Patient continues with skilled PT services and is slowly progressing towards goals.  Patient's functional mobility remains limited by the following: SOB/DOUGLAS, impaired visual-proprioception, ataxia with impaired LE coordination, impaired cardiopulmonary tolerance, impaired activity tolerance (standing tolerance ~ 45 seconds - fatigue with bilateral knee trembling), and impulsivity. Completed several standing trials until limited by fatigue and DOUGLAS. Completed short-distance ambulation (5 ft, 3 times) with minimal assist x 2 (significantly improved gait steadiness and confidence following eye-patch donning). Will continue to follow in order to progress patient's functional tolerance, endurance, strength, and coordination as medical status will allow. Continue to recommend discharge to IP rehab in order to maximize his functional gains once medically stable. Patient's progression toward goals since last assessment: Goals remain appropriate. Transfers from CVICU <> CVSU 2* medical status. Current Level of Function Impacting Discharge (mobility/balance): Min-Mod A x 1-2 Functional Outcome Measure: The patient scored 35/100 on the Barthel Index outcome measure which is indicative of 65% impairment in ability to perform ADLs/functional tasks. Other factors to consider for discharge: Ataxia, impaired gross LE coordination/proprioception, HIGH fall risk, HIGH caregiver burden PLAN : 
Goals have been updated based on progression since last assessment. Patient continues to benefit from skilled intervention to address the above impairments. Recommendations and Planned Interventions: bed mobility training, transfer training, gait training, therapeutic exercises, neuromuscular re-education, edema management/control, patient and family training/education, and therapeutic activities Frequency/Duration: Patient will be followed by physical therapy:  5 times a week to address goals. Recommendation for discharge: (in order for the patient to meet his/her long term goals) Therapy 3 hours per day 5-7 days per week This discharge recommendation: Has been made in collaboration with the attending provider and/or case management IF patient discharges home will need the following DME: to be determined (TBD) SUBJECTIVE:  
Patient stated Girls, I don't think I can do more.  OBJECTIVE DATA SUMMARY:  
HISTORY:   
Past Medical History:  
Diagnosis Date Degenerative disc disease, lumbar Heart failure ([facility] [address] High cholesterol Hypertension Paroxysmal atrial fibrillation ([facility] [address] 4/2/2019 Spinal stenosis Past Surgical History:  
Procedure Laterality Date COLONOSCOPY Left 11/11/2019 COLONOSCOPY at bedside performed by Isha Su MD at [address] HX CORONARY ARTERY BYPASS GRAFT    
 triple HX HERNIA REPAIR    
 HX IMPLANTABLE CARDIOVERTER DEFIBRILLATOR    
 [place] CARDIOVERSION ELECTIVE ARRHYTHMIA EXTERNAL N/A 6/10/2019 EP CARDIOVERSION performed by Liz Renteria MD at [address] CATH LAB  
 [place] CARDIOVERSION ELECTIVE ARRHYTHMIA EXTERNAL N/A 6/18/2019 EP CARDIOVERSION performed by Connie May MD at [address] CATH LAB  
 [place] INSJ/RPLCMT PERM DFB W/TRNSVNS LDS 1/DUAL [facility] N/A 6/21/2019 INSERT ICD BIV MULTI performed by Alexi Mathew MD at [address] CATH LAB  
 [place] TCAT IMPL WRLS P-ART PRS SNR L-T HEMODYN MNTR N/A 9/18/2019 IMPLANT HEART FAILURE MONITORING DEVICE performed by Loi Mancia MD at [address] CATH LAB Personal factors and/or comorbidities impacting plan of care: PMH Home Situation Home Environment: Private residence # Steps to Enter: 0 One/Two Story Residence: One story Living Alone: No 
Support Systems: Spouse/Significant Other/Partner Patient Expects to be Discharged to[de-identified] Unknown Current DME Used/Available at Home: Cane, straight, Walker, rolling, CPAP Tub or Shower Type: Shower EXAMINATION/PRESENTATION/DECISION MAKING:  
Critical Behavior: 
Neurologic State: Alert, Drowsy Orientation Level: Oriented to situation, Oriented to person, Oriented to place, Disoriented to time Cognition: Appropriate for age attention/concentration Safety/Judgement: Decreased insight into deficits, Decreased awareness of need for safety, Decreased awareness of need for assistance Hearing: Auditory Auditory Impairment: None Functional Mobility: 
Bed Mobility: 
  
Supine to Sit: Contact guard assistance;Assist x2; Additional time Sit to Supine: Moderate assistance;Assist x2; Additional time Transfers: 
Sit to Stand: Assist x2; Additional time;Minimum assistance Stand to Sit: Minimum assistance;Assist x2; Additional time Balance:  
Sitting: Intact Sitting - Static: Fair (occasional) Sitting - Dynamic: Fair (occasional) Standing: Impaired; Without support Standing - Static: Fair;Constant support Standing - Dynamic : Fair;Constant support Ambulation/Gait Training: 
Distance (ft): 5 Feet (ft)(x 3) Assistive Device: Gait belt Ambulation - Level of Assistance: Assist x2;Minimal assistance(Improved balance with eye-patch donned) Gait Abnormalities: Decreased step clearance; Ataxic;Path deviations Functional Measure: 
Barthel Index: 
 
Bathin Bladder: 0 Bowels: 10 
Groomin Dressin Feedin Mobility: 0 Stairs: 0 Toilet Use: 5 Transfer (Bed to Chair and Back): 5 Total: 35/100 The Barthel ADL Index: Guidelines 1. The index should be used as a record of what a patient does, not as a record of what a patient could do. 2. The main aim is to establish degree of independence from any help, physical or verbal, however minor and for whatever reason. 3. The need for supervision renders the patient not independent. 4. A patient's performance should be established using the best available evidence. Asking the patient, friends/relatives and nurses are the usual sources, but direct observation and common sense are also important. However direct testing is not needed. 5. Usually the patient's performance over the preceding 24-48 hours is important, but occasionally longer periods will be relevant. 6. Middle categories imply that the patient supplies over 50 per cent of the effort. 7. Use of aids to be independent is allowed. Margery Harvard., Barthel, D.W. (2834). Functional evaluation: the Barthel Index. [address] [facility] (14)2. CAROLINA Maxwell, Navdeep Bañuelos., Anali Hong., [place], [address] [facility] (1999). Measuring the change indisability after inpatient rehabilitation; comparison of the responsiveness of the Barthel Index and Functional [place] Measure. Journal of Neurology, Neurosurgery, and Psychiatry, 66(4), 704-672. RIVAS Bush, SEVERO Lindsay, & Dalia Severino MLILIAN. (2004.) Assessment of post-stroke quality of life in cost-effectiveness studies: The usefulness of the Barthel Index and the EuroQoL-5D. [facility], 13, 519-78 Activity Tolerance:  
Fair and observed SOB with activity Please refer to the flowsheet for vital signs taken during this treatment. After treatment patient left in no apparent distress:  
Supine in bed, Call bell within reach, and Side rails x 3 
 
COMMUNICATION/EDUCATION:  
The patients plan of care was discussed with: Occupational Therapist, Registered Nurse, Physician, and Rehabilitation Attendant. Fall prevention education was provided and the patient/caregiver indicated understanding., Patient/family have participated as able in goal setting and plan of care. , and Patient/family agree to work toward stated goals and plan of care. Thank you for this referral. 
Alexa Bird, PT, DPT Time Calculation: 27 mins

## 2019-12-12 NOTE — PROGRESS NOTES
ID Progress Note 2019 Subjective:  
 
Thoracentesis may have helped some Objective: Antibiotics: 1. Levaquin 2. Rifampin 3. Fluconazole 4. Vancomycin 5. Cefazolin 6. Zosyn Vitals:  
Visit Vitals BP 91/72 (BP 1 Location: Left arm, BP Patient Position: At rest) Pulse 81 Temp 98.5 °F (36.9 °C) Resp 20 Ht 6' 2\" (1.88 m) Wt 93.1 kg (205 lb 4 oz) SpO2 93% BMI 26.35 kg/m² Tmax:  Temp (24hrs), Av.8 °F (37.1 °C), Min:98.1 °F (36.7 °C), Max:99.8 °F (37.7 °C) Exam:  Lungs:  clear to auscultation bilaterally Heart:  regular rate and rhythm Abdomen:  soft, non-tender. Bowel sounds normal. No masses,  no organomegaly Urine is clearing up Labs:     
Recent Labs 19 
0403 19 
0435 12/10/19 
8472 WBC 4.5 4.2 4.8 HGB 7.8* 7.8* 7.8*  
* 131* 138* BUN 38* 38* 40* CREA 2.56* 2.72* 3.08* SGOT 16 15 18  94 100 TBILI 0.5 0.4 0.6 Cultures: No results found for: SDES Lab Results Component Value Date/Time Culture result: NO GROWTH 5 DAYS 2019 11:23 PM  
 Culture result: HEAVY ENTEROCOCCUS SPECIES NOTED (A) 2019 11:10 AM  
 Culture result: LIGHT YEAST (A) 2019 11:10 AM  
 
 
Radiology:  
 
Line/Insert Date:        
 
Assessment:  
 
1. UTI--Serratia (2 biotypes) from culture and small amount of Enterococcus 2. CHF/cardiomyopathy 3. ?aspiration event(s) 4. Renal insufficiency Objective: 1. Continue current therapy Zion Mota MD

## 2019-12-12 NOTE — PROGRESS NOTES
Physical Therapy 12/12/2019 Chart reviewed. Unable to access CVICU at this time 2* a sterile bedside procedure. Will follow up later as able/appropriate. Thank you.  
Dustin Vega, PT, DPT

## 2019-12-12 NOTE — PROGRESS NOTES
NYHA class IV A/C systolic heart failure Low EF (10%) Inotrope dependent Malnutrition  
LVAD Work-up Epistaxis Hematuria RV dysfunction Fluid overload Mild dyspnea today Re-started milrinone Hgb and platelets look good PTT therapeutic INR looks good Water flushes at night seem to make his breathing worse Hgb and platelets look good PTT therapeutic Creatinine in the mid 2's LDH and lactic acid look good Pro-calcitonin normal 
 
NT pro-BNP coming down Discussed with HF team 
 
CXR - mild pulmonary edema Visit Vitals BP 91/72 (BP 1 Location: Left arm, BP Patient Position: At rest) Pulse 79 Temp 98.5 °F (36.9 °C) Resp 21 Ht 6' 2\" (1.88 m) Wt 205 lb 4 oz (93.1 kg) SpO2 95% BMI 26.35 kg/m² LVAD Pump Speed (RPM): 4195 Pump Flow (LPM): 4.8 MAP: 82 
PI (Pulsitility Index): 3.5 Power: 4.1  Test: No 
Back Up  at Bedside & Labeled: Yes Power Module Test: No 
Driveline Site Care: No 
Driveline Dressing: Clean, Dry, and Intact Outpatient: No 
MAP in Therapeutic Range (Outpatient): Yes Testing Alarms Reviewed: Yes 
Back up SC speed: 5600 Back up Low Speed Limit: 5200 Emergency Equipment with Patient?: Yes Emergency procedures reviewed?: Yes Drive line site inspected?: No 
Drive line intergrity inspected?: Yes Drive line dressing changed?: No 
 
 
Intake/Output Summary (Last 24 hours) at 12/12/2019 1536 Last data filed at 12/12/2019 1520 Gross per 24 hour Intake 610 ml Output 4145 ml Net -3535 ml Visit Vitals BP 91/72 (BP 1 Location: Left arm, BP Patient Position: At rest) Pulse 79 Temp 98.5 °F (36.9 °C) Resp 21 Ht 6' 2\" (1.88 m) Wt 205 lb 4 oz (93.1 kg) SpO2 95% BMI 26.35 kg/m² Risk of morbidity and mortality - high Medical decision making - high complexity Total critical care time - 30 minutes (CPT 92526)

## 2019-12-12 NOTE — PROGRESS NOTES
Problem: Dysphagia (Adult) Goal: *Acute Goals and Plan of Care (Insert Text) Description Speech Pathology Re-Evaluation 12/11/19 1. Patient will tolerate pureed diet, nectar thickened liquids without overt s/s aspiration within 7 days. Re-evaluation 12/3/2019 1. Patient will tolerate least restrictive diet without adverse effects within 7 days. Discontinue Re-evaluation 11/26/2019 1. Patient will tolerate regular/thin liquid diet with no overt s/s aspiration within 7 days Re-evaluation 11/20/2019 1. Patient will tolerate mechanical soft/thin liquid diet with no overt s/s aspiration within 7 days. MET Initiated 10/28/19 1. Patient will tolerate regular diet/thin liquids without overt s/s of aspiration within 7 days MET 2. Patient will participate in [facility] for further objective assessment of swallow physiology within 7 days (once medically stable from Impella/cardiac sx standpoint) NOT MET; discontinue Outcome: Progressing Towards Goal 
  
SPEECH LANGUAGE PATHOLOGY DYSPHAGIA TREATMENT Patient: Justice Weems (75 y.o. male) Date: 12/12/2019 Diagnosis: Acute decompensated heart failure ([facility] [address] [I50.9] <principal problem not specified> Procedure(s) (LRB): LEFT BRACHIAL THROMBECTOMY (Left) 17 Days Post-Op Precautions:  Fall, Sternal(LVAD ) ASSESSMENT: 
Pt tolerating nectar-thick liquids and pureed diet without significant difficulty. Pt does require 1:1 supervision to ensure small sips. Pt continues to be at high risk for prandial aspiration given impulsivity and vocal fold dysfunction resulting in aspiration with thin liquids, combined with overall weakness/debility. Therefore, will continue to follow. PLAN: 
Recommendations and Planned Interventions: 
--pureed diet/nectar-thick liquids --1:1 supervision to ensure small sips 
--small sips/bites 
--alternate liquids/solids --please do not add ice to nectar-thick liquids as this reduces thick consistency Patient continues to benefit from skilled intervention to address the above impairments. Continue treatment per established plan of care. Discharge Recommendations: To Be Determined SUBJECTIVE:  
Patient stated, \"Nice to se you. OBJECTIVE:  
Cognitive and Communication Status: 
Neurologic State: Alert Orientation Level: Oriented to person Cognition: Appropriate for age attention/concentration Perception: Appears intact Perseveration: No perseveration noted Safety/Judgement: Decreased insight into deficits, Decreased awareness of need for safety, Decreased awareness of need for assistance Dysphagia Treatment: P.O. Trials: 
Patient Position: upright in bed Vocal quality prior to P.O.: Hoarse Consistency Presented: Nectar thick liquid How Presented: Self-fed/presented;Cup/sip Bolus Acceptance: No impairment Bolus Formation/Control: No impairment Initiation of Swallow: Delayed (# of seconds) Laryngeal Elevation: Decreased Aspiration Signs/Symptoms: None Pharyngeal Phase Characteristics: No impairment, issues, or problems Pain: 
Pain Scale 1: Numeric (0 - 10) Pain Intensity 1: 0 After treatment:  
Nursing notified COMMUNICATION/EDUCATION:  
 
The patient's plan of care including recommendations, planned interventions, and recommended diet changes were discussed with: Registered Nurse. Not applicable: Posted safety precautions in patient's room. NICO Cruz Time Calculation: 10 mins

## 2019-12-12 NOTE — PROGRESS NOTES
[address] Met with patient and family member - Lety Preston. Reviewed questions from LVAD education DVD. Discussed day of surgery and typical time line. No further questions at this time.

## 2019-12-12 NOTE — PROGRESS NOTES
Problem: Self Care Deficits Care Plan (Adult) Goal: *Acute Goals and Plan of Care (Insert Text) Description FUNCTIONAL STATUS PRIOR TO ADMISSION: Patient was modified independent using a single point cane for functional mobility. Patient able to shower and dress himself. However, patient required assistance for household management from his wife. HOME SUPPORT: The patient lived alone with wife to provide assistance. Occupational Therapy Goals: OT weekly reassessment 12/6/19: goals modified 1. Patient will perform upper body dressing moderate assistance within 7 day(s). 2.  Patient will perform lower body dressing with moderate assistance within 7 day(s). 3.  Patient will perform grooming seated EOB with minimum assistance within 7 day(s). 4.  Patient will perform toilet transfers with minimum assistance within 7 day(s). 5.  Patient will perform all aspects of toileting with moderate assistance within 7 day(s). 6.  Patient will participate in upper extremity therapeutic exercise/activities with supervision/set-up-min A for 5 minutes within 7 day(s). 7.  Patient will utilize energy conservation techniques during functional activities with verbal cues within 7 day(s). 8. Patient will verbalize 3/5 LVAD components with min verbal cues within 7 day (s). OT weekly reassessment 11/29/19: goals modified below 1. Patient will perform upper body dressing including LVAD switchovers with moderate assistance within 7 day(s). 2.  Patient will perform lower body dressing with minimal assistance within 7 day(s). 3.  Patient will perform grooming in standing with minimum assistance within 7 day(s). 4.  Patient will perform toilet transfers with minimum assistance within 7 day(s). 5.  Patient will perform all aspects of toileting with minimal assistance within 7 day(s).  
6.  Patient will participate in upper extremity therapeutic exercise/activities with supervision/set-up for 5 minutes within 7 day(s). 7.  Patient will utilize energy conservation techniques during functional activities with verbal cues within 7 day(s). 8. Patient will verbalize LVAD terminology with verbal cues within 7 day (s). Goals reviewed and continued/modified as follows 11/20/19 s/p LVAD implantation 1. Patient will perform upper body dressing including LVAD switchovers with moderate assistance within 7 day(s). 2.  Patient will perform lower body dressing with minimal assistance within 7 day(s). 3.  Patient will perform grooming with supervision/setup within 7 day(s). 4.  Patient will perform toilet transfers supervision/setupmodified independence within 7 day(s). 5.  Patient will perform all aspects of toileting with minimal assistance within 7 day(s). 6.  Patient will participate in upper extremity therapeutic exercise/activities with supervision/set-up for 5 minutes within 7 day(s). 7.  Patient will utilize energy conservation techniques during functional activities with verbal cues within 7 day(s). 8. Patient will verbalize LVAD terminology with verbal cues within 7 day (s). Goals reviewed and continued/modified as follows 11/12/19 1. Patient will perform bathing with supervision/set-up within 7 day(s). 2.  Patient will perform lower body dressing with supervision/set-up within 7 day(s). 3.  Patient will perform grooming with modified independence within 7 day(s). 4.  Patient will perform toilet transfers with modified independence within 7 day(s). 5.  Patient will perform all aspects of toileting with supervision/set-up within 7 day(s). 6.  Patient will participate in upper extremity therapeutic exercise/activities with supervision/set-up for 5 minutes within 7 day(s). 7.  Patient will utilize energy conservation techniques during functional activities with verbal cues within 7 day(s). 8. Patient will verbalize LVAD terminology with verbal cues within 7 day (s) in preparation for implant. Continue all goals 10/30/19 post re-eval for Impella removal  
Initiated 10/28/2019 1. Patient will perform bathing with supervision/set-up within 7 day(s). 2.  Patient will perform lower body dressing with supervision/set-up within 7 day(s). 3.  Patient will perform grooming with modified independence within 7 day(s). 4.  Patient will perform toilet transfers with modified independence within 7 day(s). 5.  Patient will perform all aspects of toileting with supervision/set-up within 7 day(s). 6.  Patient will participate in upper extremity therapeutic exercise/activities with supervision/set-up for 5 minutes within 7 day(s). 7.  Patient will utilize energy conservation techniques during functional activities with verbal cues within 7 day(s). Outcome: Progressing Towards Goal 
 OCCUPATIONAL THERAPY TREATMENT Patient: Justice Weems (75 y.o. male) Date: 12/12/2019 Diagnosis: Acute decompensated heart failure ([facility] [address] [I50.9] <principal problem not specified> Procedure(s) (LRB): LEFT BRACHIAL THROMBECTOMY (Left) 17 Days Post-Op Precautions: Fall, Sternal(LVAD ) Chart, occupational therapy assessment, plan of care, and goals were reviewed. ASSESSMENT Patient continues with skilled OT services and is progressing towards goals. Patient continues to be limited by generalized weakness, decreased endurance, decreased activity tolerance, impaired balance, impaired coordination with BLE tremulous, visual perceptual/acuity deficits, impulsivity, decreased insight into deficits, and decreased safety awareness. Patient receptive to initial re-introduction of LVAD terminology today and patient able to recall .  Patient required largely MIN A x 2 for standing grooming tasks today however requiring increased time and extended rest breaks with RR 20-29 and unable to obtain accurate pleth for O2 saturation reading, however patient on 4L NC O2. Patient fatigues quickly in session. Continue to recommend rehab at discharge to maximize patient safety and independence with ADL transfers and tasks. Current Level of Function Impacting Discharge (ADLs): MIN A x 2 grooming; MOD A LB ADLs Other factors to consider for discharge: cognition PLAN : 
Patient continues to benefit from skilled intervention to address the above impairments. Continue treatment per established plan of care. to address goals. Recommend with staff: OOB x 3 to chair; LVAD education Recommend next OT session: standing endurance for ADL tasks Recommendation for discharge: (in order for the patient to meet his/her long term goals) Therapy 3 hours per day 5-7 days per week This discharge recommendation: 
Has been made in collaboration with the attending provider and/or case management IF patient discharges home will need the following DME: to be determined (TBD) SUBJECTIVE:  
Patient stated I need to get back to bed.  OBJECTIVE DATA SUMMARY:  
Cognitive/Behavioral Status: 
Neurologic State: Alert;Drowsy Orientation Level: Oriented to situation;Oriented to person;Oriented to place; Disoriented to time Cognition: Appropriate for age attention/concentration Perception: Appears intact Perseveration: No perseveration noted Safety/Judgement: Decreased insight into deficits; Decreased awareness of need for safety;Decreased awareness of need for assistance Functional Mobility and Transfers for ADLs: 
Bed Mobility: 
Supine to Sit: Contact guard assistance;Assist x2; Additional time Sit to Supine: Moderate assistance;Assist x2; Additional time Transfers: 
Sit to Stand: Assist x2; Additional time;Minimum assistance Functional Transfers Toilet Transfer : Minimum assistance;Assist x2; Additional time(for safety <> BSC) Balance: 
Sitting: Intact Sitting - Static: Fair (occasional) Sitting - Dynamic: Fair (occasional) Standing: Impaired; Without support Standing - Static: Fair;Constant support Standing - Dynamic : Fair;Constant support ADL Intervention: 
  
 
Grooming Washing Hands: Minimum assistance(x2 with additional time and required frequent rest breaks) Cognitive Retraining Safety/Judgement: Decreased insight into deficits; Decreased awareness of need for safety;Decreased awareness of need for assistance Activity Tolerance:  
Fair and requires frequent rest breaks Please refer to the flowsheet for vital signs taken during this treatment. After treatment patient left in no apparent distress:  
Supine in bed and Call bell within reach COMMUNICATION/COLLABORATION:  
The patients plan of care was discussed with: Physical Therapist and Registered Nurse Tru Lucio Time Calculation: 27 mins

## 2019-12-12 NOTE — PROGRESS NOTES
Chart reviewed. Unable to access CVICU at this time 2* a sterile bedside procedure. Will follow up later as able/appropriate. Thank you.

## 2019-12-12 NOTE — PROGRESS NOTES
[address] [facility] [address] [facility] in [place], [place] Inpatient Progress Note Patient name: Rebecca Frost Patient : 1950 Patient MRN: [id number] Attending MD: Luisa Bailey MD 
Date of service: 19 Chief Complaint:  
Burt Schwartz azucena LVAD implant 
  
HPI: Sona Kiser is a 71y.o. year old pleasant white male with a history of HTN, HLD, JOSE, CAD s/p cardiac arrest VFib s/p CABG () c/b sternal would infection and sternectomy, ischemic cardiomyopathy LVEF 15-20%, s/p ICD and with LBBB. He was admitted with acute on chronic systolic heart failure with massive volume overload > 20 lbs, in the setting of atrial fibrillation s/p failed DCCV and underwent DCCV and RHC on .  S/p BiVICD on 19 with Dr. Camille Duarte. He was discharged home home on IV milrinone on 19. Skyler Harry has been followed closely by Dr. Jarrod Yen and the [facility]. 
  
Mr. Kiser was admitted for acute on chronic systolic heart failure. He underwent implantation of Impella 5.0 due to CS and has completed his LVAD evaluation. Skyler Harry meets criteria for implant of HM3 as DT. He was NYHA Class IV prior to implant of Impella 5.0, has LVEF < 15%, was intolerant of GDMT due to symptomatic hypotension and renal dysfunction. He remains dependent on temporary MCS support. RHC  revealed compensated hemodynamics on Impella. His renal function has improved dramatically with improvement in his hemodynamics. He is not a suitable transplant candidate due to single kidney, sternectomy, debility, and frailty. He was reviewed by [facility] and felt to be a high risk heart transplant candidate due to multiple co-morbidities as well as the afore mentioned conditions.  He remained in the CCU and underwent a HM 3 implant as DT on . He was weaned off of pressors and transferred to [place] [address] where he continues to undergo PT/OT and hemodynamic optimization.   
  
24Hr Events Continues to complain of dyspnea Right pigtail cath with minimal drainage Renal function improving daily INR 2.3 Procedure:  Procedure(s): REMOVAL TEMPORARY LEFT VENTRICULAR ASSIST DEVICE, IMPLANTATION OF LEFT VENTRICULAR ASSIST DEVICE PERMANENT (VAD), ECC, JACQUE, EPI AORTIC US BY DR Nugent Necessary.    
POD:  23 
  
Impression / Plan:  
Heart Failure Status: NYHA Class IV Chronic systolic heart failure due to ICM, NYHA Class IV, EF < 15%, cardiogenic shock bridged with Impella 5.0 support to HM 3 implant S/p Impella removal and LVAD implant 11/18/19 Continue RPMs at 5600 - LVIDd down to 5.3 cm  
TTE with bubble study negative for PFO Repeat TTE pending Resume milrinone 0.1 mcg/kg/min Obtain daily CardioMEMs readings Bumex 1 mg IV x 1 today after blood transfusion Cont Sildenafil 20 mg PO TID - rx sent to local pharmacy to initiate PA Intolerant of BB due to RV failure Intolerant of ACEi/ARB/ARNI/AA due to JUAN J on CKD 3 Strict I/O, daily weights, Na+ restricted diet Continue LVAD education Daily dressing changes until POD 30 Generalized myoclonus Improved Appreciate Neurology input Continue Keppra with renal dosing Head CT negative for acute process EEG suggestive of mild generalized encephalopathic process, possibly related to underlying structural brain injury vs metabolic abnormality Monitor closely Remain in CVICU 
  
Anticoagulation for LVAD, INR goal 2-3 Continue ASA INR 2.3  
2 mg warfarin tonight  
  
Acute Respiratory Failure post op Improved with aggressive diuresis, but still c/o dyspnea CXR and O2 requirement improved ABG acceptable TTE with Bubble study negative for PFO Pulmonary Consult appreciated Pulmonary hygiene Cont home CPAP- must use while sleeping Thoracentesis with pigtail cath - minimal drainage 
  
Post op Pain PRN Tylenol Comfort measures  
  
L Brachial Artery Thrombosis s/p thrombectomy Surgical management per Dr. Nolasco Pea Pain improved  
  
 Acute on CKD 3, atrophic left kidney Creatinine stable today Appreciate Nephrology assistance Watch Cr - improving Bumex PRN CVP > 10 mmHg Avoid nephrotoxins, trend labs Renally dose meds  
  
CAD s/p CABG Cont low dose ASA- watch platelets No BB d/t RV failure Hold statin due to recent hepatic failure LHC performed 11/13 - low likelihood of benefit from revascularization  
  
Hx of VF arrest s/p BiV-ICD No recent shocks Keep K > 4 and Mg > 2  
   
PAF In Afib today Dig level 0.8 - consider resuming dose tomorrow Repeat TFTs WNL 
  
Hx of gout Uric acid WNL 
  
Suspected aspiration pneumonia RUL consolidation Suspect aspiration related to over sedation Sputum culture showing scant growth of yeast 
Discontinue Zosyn Urinary retention, c/b serratia UTI and hematuria Appreciate Urology consult Cystoscopy performed 11/13 shows catheter induced cystitis Repeat UA shows resolution of UTI  
  
Malnutrition Appreciate Nutritionist consult Prealbumin weekly Continue pureed diet with nectar thick liquids Cont TF via dobhoff - at goal  
Intolerant of mirtazapine d/t tremors, confusion  
  
COPD Appreciate Pulmonology assistance Continue nebs PRN   
  
Anemia Multifactorial, due to hemolysis + epistaxis + hematuria Intolerant of octreotide or doxycycline for AVM ppx due to prolonged QTc Transfuse for Hgb goal > 8 
  
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  
  
Hx of sternal wound infection, sternectomy Sternum closed post op- monitor  
  
Vocal cord paralysis Improved ENT recs appreciated Neck CT- R neck edema, no airway compromise Speech therapy following - appreciate input 
  
Debility Continue PT/OT  
  
Ppx Protonix PT/OT Bowel regimen Continue pureed diet with nectar thick liquids PICC placed 
  
Dispo: Will need IP rehab. Transfer to CVSU. LVAD INTERROGATION: 
Device interrogated in person Device function normal, normal flow, no events LVAD Pump Speed (RPM): 1826 Pump Flow (LPM): 4.8 MAP: 82 
PI (Pulsitility Index): 3.6 Power: 4.2  Test: No 
Back Up  at Bedside & Labeled: Yes Power Module Test: No 
Driveline Site Care: No 
Driveline Dressing: Clean, Dry, and Intact Outpatient: No 
MAP in Therapeutic Range (Outpatient): Yes Testing Alarms Reviewed: Yes 
Back up SC speed: 5600 Back up Low Speed Limit: 5200 Emergency Equipment with Patient?: Yes Emergency procedures reviewed?: Yes Drive line site inspected?: No 
Drive line intergrity inspected?: Yes Drive line dressing changed?: No 
 
PHYSICAL EXAM: 
Visit Vitals BP 91/72 (BP 1 Location: Left arm, BP Patient Position: At rest) Pulse 74 Temp 98.9 °F (37.2 °C) Resp 20 Ht 6' 2\" (1.88 m) Wt 205 lb 4 oz (93.1 kg) SpO2 92% BMI 26.35 kg/m² Physical Exam  
Constitutional: He appears well-developed. He appears lethargic. He appears cachectic. No distress. More fatigued today HENT:  
Head: Normocephalic. Neck: Normal range of motion. Neck supple. No JVD present. Cardiovascular: Regular rhythm. + VAD hum Pulmonary/Chest: Effort normal and breath sounds normal. No respiratory distress. Abdominal: Soft. Bowel sounds are normal. He exhibits no distension. Dobhoff in place Musculoskeletal: Normal range of motion. General: No edema. Neurological: He appears lethargic. Skin: Skin is warm and dry. Nursing note and vitals reviewed. REVIEW OF SYSTEMS: 
Review of Systems Constitutional: Positive for malaise/fatigue. Negative for chills and fever. HENT: Positive for hearing loss. Respiratory: Positive for shortness of breath. Negative for cough. Mild dyspnea Cardiovascular: Negative for chest pain, palpitations, orthopnea and leg swelling. Gastrointestinal: Negative for heartburn, nausea and vomiting. Genitourinary: Negative. Musculoskeletal: Negative. Neurological: Positive for weakness. Negative for dizziness and headaches. Endo/Heme/Allergies: Negative. PAST MEDICAL HISTORY: 
Past Medical History:  
Diagnosis Date  Degenerative disc disease, lumbar  Heart failure ([facility] [address]  High cholesterol  Hypertension  Paroxysmal atrial fibrillation ([facility] [address] 4/2/2019  Spinal stenosis PAST SURGICAL HISTORY: 
Past Surgical History:  
Procedure Laterality Date  COLONOSCOPY Left 11/11/2019 COLONOSCOPY at bedside performed by Reyes Solo, MD at [address]  HX CORONARY ARTERY BYPASS GRAFT    
 triple  HX HERNIA REPAIR    
 HX IMPLANTABLE CARDIOVERTER DEFIBRILLATOR  NV CARDIOVERSION ELECTIVE ARRHYTHMIA EXTERNAL N/A 6/10/2019 EP CARDIOVERSION performed by Mj Gamez MD at [place] [address], [facility] CATH LAB  NV CARDIOVERSION ELECTIVE ARRHYTHMIA EXTERNAL N/A 6/18/2019 EP CARDIOVERSION performed by Carlos Browne MD at [place] [address], [facility] CATH LAB  NV INSJ/RPLCMT PERM DFB W/TRNSVNS LDS 1/DUAL CHMBR N/A 6/21/2019 INSERT ICD BIV MULTI performed by Jose Myles MD at [place] [address], [facility] CATH LAB  NV TCAT IMPL WRLS P-ART PRS SNR L-T HEMODYN MNTR N/A 9/18/2019 IMPLANT HEART FAILURE MONITORING DEVICE performed by Laurel Brown MD at [place] [address], [facility] CATH LAB FAMILY HISTORY: 
Family History Problem Relation Age of Onset  Lung Disease Mother  Hypertension Mother Canseco Arthritis-osteo Mother  Heart Disease Mother  Heart Disease Father  Diabetes Father SOCIAL HISTORY: 
Social History Socioeconomic History  Marital status:  Spouse name: Not on file  Number of children: Not on file  Years of education: Not on file  Highest education level: Not on file Tobacco Use  Smoking status: Former Smoker Last attempt to quit: 2010 Years since quittin.0  Smokeless tobacco: Never Used Substance and Sexual Activity  Alcohol use: Not Currently Comment: rarely  Drug use: Never Other Topics Concern LABORATORY RESULTS: 
  
Labs Latest Ref Rng & Units 2019 2019 12/10/2019 2019 2019 2019 2019 WBC 4.1 - 11.1 K/uL 4.5 4.2 4.8 5.2 5.5 5.1 5.1  
RBC 4.10 - 5.70 M/uL 2.63(L) 2.64(L) 2.63(L) 2.67(L) 2.68(L) 2.85(L) 2.79(L) Hemoglobin 12.1 - 17.0 g/dL 7. 8(L) 7. 8(L) 7. 8(L) 7. 9(L) 8.0(L) 8.4(L) 8. 3(L) Hematocrit 36.6 - 50.3 % 25. 9(L) 26. 3(L) 26. 7(L) 26. 9(L) 26. 9(L) 28. 3(L) 27. 9(L) MCV 80.0 - 99.0 FL 98.5 99. 6(H) 101. 5(H) 100. 7(H) 100. 4(H) 99. 3(H) 100. 0(H) Platelets 694 - 114 K/uL 149(L) 131(L) 138(L) 134(L) 146(L) 174 201 Lymphocytes 12 - 49 % - - - - - - - Monocytes 5 - 13 % - - - - - - - Eosinophils 0 - 7 % - - - - - - - Basophils 0 - 1 % - - - - - - - Albumin 3.5 - 5.0 g/dL 2. 5(L) 2. 4(L) 2. 6(L) 2. 6(L) 2. 9(L) 3. 1(L) 3. 2(L) Calcium 8.5 - 10.1 MG/DL 9.0 8.5 8.8 9.0 8.6 9.4 9.3 SGOT 15 - 37 U/L 16 15 18 15 14(L) 12(L) 14(L) Glucose 65 - 100 mg/dL 105(H) 97 96 120(H) 142(H) 145(H) 98 BUN 6 - 20 MG/DL 38(H) 38(H) 40(H) 39(H) 34(H) 29(H) 24(H) Creatinine 0.70 - 1.30 MG/DL 2.56(H) 2.72(H) 3.08(H) 3.43(H) 3.46(H) 3.01(H) 2.38(H) Sodium 136 - 145 mmol/L 140 142 143 143 147(H) 147(H) 143 Potassium 3.5 - 5.1 mmol/L 4.0 4.1 4.2 4.2 3.9 3.2(L) 3.9 TSH 0.36 - 3.74 uIU/mL - - - - - - -  
PSA 0.01 - 4.0 ng/mL - - - - - - -  
LDH 85 - 241 U/L 249(H) 240 257(H) 257(H) 247(H) 250(H) 234 CEA ng/mL - - - - - - - Some recent data might be hidden Lab Results Component Value Date/Time TSH 2.30 2019 03:29 AM  
 TSH 2.40 10/25/2019 07:39 PM  
 TSH 2.45 2019 04:16 AM  
 
 
ALLERGY: 
No Known Allergies CURRENT MEDICATIONS: 
Current Facility-Administered Medications Medication Dose Route Frequency  0.9% sodium chloride infusion 250 mL  250 mL IntraVENous PRN  
 milrinone (PRIMACOR) 20 MG/100 ML D5W infusion  0.1 mcg/kg/min IntraVENous CONTINUOUS  
 bumetanide (BUMEX) injection 1 mg  1 mg IntraVENous ONCE  piperacillin-tazobactam (ZOSYN) 3.375 g in 0.9% sodium chloride (MBP/ADV) 100 mL  3.375 g IntraVENous Q8H  
 levETIRAcetam (KEPPRA) oral solution 250 mg  250 mg Oral Q12H  pantoprazole (PROTONIX) tablet 40 mg  40 mg Oral ACB  aspirin chewable tablet 81 mg  81 mg Oral DAILY  0.9% sodium chloride infusion  3 mL/hr IntraVENous CONTINUOUS  
 albuterol-ipratropium (DUO-NEB) 2.5 MG-0.5 MG/3 ML  3 mL Nebulization Q4H RT  
 allopurinol (ZYLOPRIM) tablet 100 mg  100 mg Oral DAILY  sildenafil (REVATIO) 2 mg/mL oral suspension 20 mg  20 mg Oral Q8H  
 arformoterol (BROVANA) neb solution 15 mcg  15 mcg Nebulization BID RT And  
 budesonide (PULMICORT) 500 mcg/2 ml nebulizer suspension  500 mcg Nebulization BID RT  
 magnesium oxide (MAG-OX) tablet 400 mg  400 mg Oral DAILY  artificial saliva (MOUTH KOTE) 1 Spray  1 Spray Oral PRN  
 lactobac ac& pc-s.therm-b.anim (TIAN Q/RISAQUAD)  1 Cap Oral DAILY  oxyCODONE IR (ROXICODONE) tablet 5 mg  5 mg Oral Q6H PRN  
 balsam peru-castor oil (VENELEX) ointment   Topical TID  tamsulosin (FLOMAX) capsule 0.4 mg  0.4 mg Oral DAILY  insulin lispro (HUMALOG) injection   SubCUTAneous AC&HS  Warfarin MD/NP dosing   Other PRN  
 epoetin yvonne-epbx (RETACRIT) injection 20,000 Units  20,000 Units SubCUTAneous Q7D  
 sodium chloride (NS) flush 5-40 mL  5-40 mL IntraVENous Q8H  
 sodium chloride (NS) flush 5-40 mL  5-40 mL IntraVENous PRN  
 acetaminophen (TYLENOL) tablet 650 mg  650 mg Oral Q6H PRN  
 naloxone (NARCAN) injection 0.4 mg  0.4 mg IntraVENous PRN  
 ondansetron (ZOFRAN) injection 4 mg  4 mg IntraVENous Q4H PRN  
 senna-docusate (PERICOLACE) 8.6-50 mg per tablet 1 Tab  1 Tab Oral DAILY  bisacodyl (DULCOLAX) tablet 5 mg  5 mg Oral DAILY PRN  
 sucralfate (CARAFATE) tablet 1 g  1 g Oral AC&HS  influenza vaccine 2019-20 (6 mos+)(PF) (FLUARIX/FLULAVAL/FLUZONE QUAD) injection 0.5 mL  0.5 mL IntraMUSCular PRIOR TO DISCHARGE  hydrALAZINE (APRESOLINE) 20 mg/mL injection 20 mg  20 mg IntraVENous Q6H PRN  
 dextrose 10 % infusion 125-250 mL  125-250 mL IntraVENous PRN Thank you for allowing me to participate in this patient's care. TIERRA Donovan [address], [address] Phone: [phone] Fax: [phone] [facility] ATTENDING ADDENDUM Patient was seen and examined in person. Data and notes were reviewed. I have discussed and agree with the plan as noted in the NP note above without further additions. Sue Encinas MD PhD 
[address] [facility]

## 2019-12-12 NOTE — PROGRESS NOTES
[facility] 
 [address] [facility] [place], [address] [facility] Phone: [phone]   [phone]   
  
Nephrology Progress Note Sona Kiser     1950     [id number] Date of Admission : 10/25/2019 
12/12/19 CC:  Follow up for JUAN J, CKD, Hyponatremia Assessment and Plan JUAN J on CKD: 
- likely ATN +/- overdiuresis - Cr improving. However, am giving Bumex on 12/12 for a CVP 10-13 and SOB 
- will also decrease the water flushes a bit, as the Na is better. - voiding ok 
- diurese PRN if CVP > 10 
- daily labs Hypokalemia  
- replete PRN Hypernatremia : 
- Na improved to 140. Myoclonus and Encephalopathy Possible CVA, 3 rd nerve palsy  
- unable to get CTA/MRA 
- per neuro - On Keppra 
  
Gross hematuria, BPH w/ retention: 
- off CBI pre VAD. cysto pre op showed catheter related Cystitis @ postr wall  
- neal had to be replaced due to urinary retention 
- flomax - on hold  
- keep neal for now until he is stable from CHF stand point Acute on Chronic HFrEF  
- EF 16-20%, NYHA Class IV , hx of VF arrest - s/p AICD 
- Impella insertion 10/29- removed 11/18  
- s/p LVAD placement on 11/18 
- s/p right thoracentesis. CT in place Hoarseness, vocal cord paralysis, mediastinal adenopathy: 
- will need eventual PET/CT 
  
L arm clot s/p thrombectomy on 11/25 JOSE on CPAP  
  
PAF s/p DCCV 6/19 
  
Anemia: 
- from blood loss s/p multiple blood products - s/p IV iron,  Repeat iron studies ok 
- on PAVEL q7 d Serratia and Enteroccocus UTI: 
- completed abx Interval History:   
Seen and examined. S/p right thoracentesis. On 12/12:  Pt c/o a very dry and hoarse throat. Also c/o increased SOB. He and the nurses report that the SOB seems to get worse during the nights. RN's wonder if it's related to the TF and water flushes. CVP 10-13. Input 1725 cc        Output 1665 Review of Systems: UNABLE TO obtain ROS Current Medications: Current Facility-Administered Medications Medication Dose Route Frequency  bumetanide (BUMEX) injection 2 mg  2 mg IntraVENous ONCE  piperacillin-tazobactam (ZOSYN) 3.375 g in 0.9% sodium chloride (MBP/ADV) 100 mL  3.375 g IntraVENous Q8H  
 levETIRAcetam (KEPPRA) oral solution 250 mg  250 mg Oral Q12H  pantoprazole (PROTONIX) tablet 40 mg  40 mg Oral ACB  aspirin chewable tablet 81 mg  81 mg Oral DAILY  0.9% sodium chloride infusion  3 mL/hr IntraVENous CONTINUOUS  
 albuterol-ipratropium (DUO-NEB) 2.5 MG-0.5 MG/3 ML  3 mL Nebulization Q4H RT  
 allopurinol (ZYLOPRIM) tablet 100 mg  100 mg Oral DAILY  sildenafil (REVATIO) 2 mg/mL oral suspension 20 mg  20 mg Oral Q8H  
 arformoterol (BROVANA) neb solution 15 mcg  15 mcg Nebulization BID RT And  
 budesonide (PULMICORT) 500 mcg/2 ml nebulizer suspension  500 mcg Nebulization BID RT  
 magnesium oxide (MAG-OX) tablet 400 mg  400 mg Oral DAILY  artificial saliva (MOUTH KOTE) 1 Spray  1 Spray Oral PRN  
 lactobac ac& pc-s.therm-b.anim (TIAN Q/RISAQUAD)  1 Cap Oral DAILY  oxyCODONE IR (ROXICODONE) tablet 5 mg  5 mg Oral Q6H PRN  
 balsam peru-castor oil (VENELEX) ointment   Topical TID  tamsulosin (FLOMAX) capsule 0.4 mg  0.4 mg Oral DAILY  insulin lispro (HUMALOG) injection   SubCUTAneous AC&HS  Warfarin MD/NP dosing   Other PRN  
 epoetin yvonne-epbx (RETACRIT) injection 20,000 Units  20,000 Units SubCUTAneous Q7D  
 sodium chloride (NS) flush 5-40 mL  5-40 mL IntraVENous Q8H  
 sodium chloride (NS) flush 5-40 mL  5-40 mL IntraVENous PRN  
 acetaminophen (TYLENOL) tablet 650 mg  650 mg Oral Q6H PRN  
 naloxone (NARCAN) injection 0.4 mg  0.4 mg IntraVENous PRN  
 ondansetron (ZOFRAN) injection 4 mg  4 mg IntraVENous Q4H PRN  
 senna-docusate (PERICOLACE) 8.6-50 mg per tablet 1 Tab  1 Tab Oral DAILY  bisacodyl (DULCOLAX) tablet 5 mg  5 mg Oral DAILY PRN  
 sucralfate (CARAFATE) tablet 1 g  1 g Oral AC&HS  
  influenza vaccine 2019-20 (6 mos+)(PF) (FLUARIX/FLULAVAL/FLUZONE QUAD) injection 0.5 mL  0.5 mL IntraMUSCular PRIOR TO DISCHARGE  hydrALAZINE (APRESOLINE) 20 mg/mL injection 20 mg  20 mg IntraVENous Q6H PRN  
 dextrose 10 % infusion 125-250 mL  125-250 mL IntraVENous PRN No Known Allergies Objective: 
Vitals:   
Vitals:  
 12/12/19 0200 12/12/19 0300 12/12/19 0400 12/12/19 0500 BP:      
Pulse: 80 75 75 77 Resp: 20 22 17 17 Temp:   (P) 98.4 °F (36.9 °C) SpO2: 92% (P) 95% (P) 94% 100% Weight:      
Height:      
 
Intake and Output: 
No intake/output data recorded. 12/10 1901 - 12/12 0700 In: 2389.4 [P.O.:840; I.V.:654.4] Out: 9122 [Urine:2405] Physical Examination: 
 
General: Elderly man in no acute distress HEENT:            Ng in place; very dry mucous membranes Neck:  Lines + Resp:  Decreased bibasilar breath sounds; no resp distress CV:  VAD sounds;  1-2+ LE edema Chest:             Chest tube in place GI:  Soft and non-tender; no distension Neurologic:  Alert and appropriate; normal speech :  + neal; clear urine [x]    High complexity decision making was performed 
[x]    Patient is at high-risk of decompensation with multiple organ involvement Lab Data Personally Reviewed: I have reviewed all the pertinent labs, microbiology data and radiology studies during assessment. Recent Labs 12/12/19 0403 12/11/19 
0435 12/10/19 
4021  142 143  
K 4.0 4.1 4.2  107 109* CO2 27 26 28 * 97 96 BUN 38* 38* 40* CREA 2.56* 2.72* 3.08* CA 9.0 8.5 8.8 MG 2.0 2.0 2.3 ALB 2.5* 2.4* 2.6* SGOT 16 15 18 ALT 11* 11* 9* INR 2.3* 2.4* 1.4* Recent Labs 12/12/19 
0403 12/11/19 
0435 12/10/19 
0254 WBC 4.5 4.2 4.8 HGB 7.8* 7.8* 7.8* HCT 25.9* 26.3* 26.7*  
* 131* 138* No results found for: SDES Lab Results Component Value Date/Time  Culture result: NO GROWTH 5 DAYS 12/06/2019 11:23 PM  
 Culture result: HEAVY ENTEROCOCCUS SPECIES NOTED (A) 11/27/2019 11:10 AM  
 Culture result: LIGHT YEAST (A) 11/27/2019 11:10 AM  
 Culture result: NO GROWTH 5 DAYS 11/12/2019 11:18 AM  
 Culture result: SERRATIA MARCESCENS (A) 10/31/2019 10:05 AM  
 Culture result: SERRATIA MARCESCENS (2ND COLONY TYPE/STRAIN) (A) 10/31/2019 10:05 AM  
 Culture result: ENTEROCOCCUS FAECALIS GROUP D (A) 10/31/2019 10:05 AM  
 
Recent Results (from the past 24 hour(s)) GLUCOSE, POC Collection Time: 12/11/19  7:31 AM  
Result Value Ref Range Glucose (POC) 132 (H) 65 - 100 mg/dL Performed by PRISCILLA WOOD, POC Collection Time: 12/11/19  8:17 AM  
Result Value Ref Range Glucose (POC) 112 (H) 65 - 100 mg/dL Performed by Jackie Stevens, POC Collection Time: 12/11/19 11:19 AM  
Result Value Ref Range Glucose (POC) 107 (H) 65 - 100 mg/dL Performed by Jackie Stevens, POC Collection Time: 12/11/19  4:22 PM  
Result Value Ref Range Glucose (POC) 111 (H) 65 - 100 mg/dL Performed by Judy Cordon   
PTT Collection Time: 12/11/19  4:23 PM  
Result Value Ref Range aPTT 74.4 (H) 22.1 - 32.0 sec  
 aPTT, therapeutic range     58.0 - 37.6 SECS  
METABOLIC PANEL, COMPREHENSIVE Collection Time: 12/12/19  4:03 AM  
Result Value Ref Range Sodium 140 136 - 145 mmol/L Potassium 4.0 3.5 - 5.1 mmol/L Chloride 107 97 - 108 mmol/L  
 CO2 27 21 - 32 mmol/L Anion gap 6 5 - 15 mmol/L Glucose 105 (H) 65 - 100 mg/dL BUN 38 (H) 6 - 20 MG/DL Creatinine 2.56 (H) 0.70 - 1.30 MG/DL  
 BUN/Creatinine ratio 15 12 - 20 GFR est AA 30 (L) >60 ml/min/1.73m2 GFR est non-AA 25 (L) >60 ml/min/1.73m2 Calcium 9.0 8.5 - 10.1 MG/DL Bilirubin, total 0.5 0.2 - 1.0 MG/DL  
 ALT (SGPT) 11 (L) 12 - 78 U/L  
 AST (SGOT) 16 15 - 37 U/L Alk. phosphatase 107 45 - 117 U/L Protein, total 6.5 6.4 - 8.2 g/dL Albumin 2.5 (L) 3.5 - 5.0 g/dL Globulin 4.0 2.0 - 4.0 g/dL A-G Ratio 0.6 (L) 1.1 - 2.2 MAGNESIUM Collection Time: 12/12/19  4:03 AM  
Result Value Ref Range Magnesium 2.0 1.6 - 2.4 mg/dL CBC W/O DIFF Collection Time: 12/12/19  4:03 AM  
Result Value Ref Range WBC 4.5 4.1 - 11.1 K/uL  
 RBC 2.63 (L) 4.10 - 5.70 M/uL HGB 7.8 (L) 12.1 - 17.0 g/dL HCT 25.9 (L) 36.6 - 50.3 % MCV 98.5 80.0 - 99.0 FL  
 MCH 29.7 26.0 - 34.0 PG  
 MCHC 30.1 30.0 - 36.5 g/dL  
 RDW 17.6 (H) 11.5 - 14.5 % PLATELET 861 (L) 120 - 400 K/uL MPV 10.1 8.9 - 12.9 FL  
 NRBC 0.0 0  WBC ABSOLUTE NRBC 0.00 0.00 - 0.01 K/uL PROTHROMBIN TIME + INR Collection Time: 12/12/19  4:03 AM  
Result Value Ref Range INR 2.3 (H) 0.9 - 1.1 Prothrombin time 22.4 (H) 9.0 - 11.1 sec PTT Collection Time: 12/12/19  4:03 AM  
Result Value Ref Range aPTT 66.2 (H) 22.1 - 32.0 sec  
 aPTT, therapeutic range     58.0 - 77.0 SECS  
DIGOXIN Collection Time: 12/12/19  4:03 AM  
Result Value Ref Range Digoxin level 0.8 (L) 0.90 - 2.00 NG/ML  
LACTIC ACID Collection Time: 12/12/19  4:03 AM  
Result Value Ref Range Lactic acid 1.3 0.4 - 2.0 MMOL/L  
PROCALCITONIN Collection Time: 12/12/19  4:03 AM  
Result Value Ref Range Procalcitonin 0.10 ng/mL LD Collection Time: 12/12/19  4:03 AM  
Result Value Ref Range  (H) 85 - 241 U/L  
NT-PRO BNP Collection Time: 12/12/19  4:03 AM  
Result Value Ref Range NT pro-BNP 15,198 (H) <125 PG/ML Ruth Ann Acuna MD

## 2019-12-13 NOTE — PROGRESS NOTES
[facility] Inpatient Progress Note Patient name: Prince Carias Patient : 1950 Patient MRN: [id number] Attending MD: Batsheva Lawrence MD 
Date of service: 19 Chief Complaint:  
Tavares Occitan azucena LVAD implant 
  
HPI: Sona Kiser is a 71y.o. year old pleasant white male with a history of HTN, HLD, JOSE, CAD s/p cardiac arrest VFib s/p CABG () c/b sternal would infection and sternectomy, ischemic cardiomyopathy LVEF 15-20%, s/p ICD and with LBBB. He was admitted with acute on chronic systolic heart failure with massive volume overload > 20 lbs, in the setting of atrial fibrillation s/p failed DCCV and underwent DCCV and RHC on .  S/p BiVICD on 19 with Dr. Britany Roldan. He was discharged home home on IV milrinone on 19. [facility] has been followed closely by Dr. Cintia Oliver and the [facility]. 
  
Mr. Kiser was admitted for acute on chronic systolic heart failure. He underwent implantation of Impella 5.0 due to CS and has completed his LVAD evaluation. [facility] meets criteria for implant of HM3 as DT. He was NYHA Class IV prior to implant of Impella 5.0, has LVEF < 15%, was intolerant of GDMT due to symptomatic hypotension and renal dysfunction. He remains dependent on temporary MCS support. RHC  revealed compensated hemodynamics on Impella. His renal function has improved dramatically with improvement in his hemodynamics. He is not a suitable transplant candidate due to single kidney, sternectomy, debility, and frailty. He was reviewed by [facility] and felt to be a high risk heart transplant candidate due to multiple co-morbidities as well as the afore mentioned conditions.  He remained in the CCU and underwent a HM 3 implant as DT on . He was weaned off of pressors and transferred to [place] [id number] where he continues to undergo PT/OT and hemodynamic optimization.   
  
24Hr Events Symptomatically improved with addition of milrinone Right pigtail cath with minimal drainage Renal function improving daily INR 1.6 Procedure:  Procedure(s): REMOVAL TEMPORARY LEFT VENTRICULAR ASSIST DEVICE, IMPLANTATION OF LEFT VENTRICULAR ASSIST DEVICE PERMANENT (VAD), ECC, JACQUE, EPI AORTIC US BY DR Yasmeen Hansen.    
POD:  24 
  
Impression / Plan:  
Heart Failure Status: NYHA Class IV Chronic systolic heart failure due to ICM, NYHA Class IV, EF < 15%, cardiogenic shock bridged with Impella 5.0 support to HM 3 implant S/p Impella removal and LVAD implant 11/18/19 Increase RPMs to 5800 - LVIDd increased to 6.07 cm from 5.3 cm Consider CTA of outflow cannula when renal function has recovered due to inability to offload LV on high speed TTE with bubble study negative for PFO Increase milrinone to 0.2 mcg/kg/min Obtain daily CardioMEMs readings when in stepdown unit Bumex 2 mg IV BID today after blood transfusion Cont Sildenafil 20 mg PO TID - rx sent to local pharmacy to initiate PA Intolerant of BB due to RV failure Intolerant of ACEi/ARB/ARNI/AA due to JUAN J on CKD 3 Strict I/O, daily weights, Na+ restricted diet Continue LVAD education Daily dressing changes until POD 30 Generalized myoclonus Improved Appreciate Neurology input Continue Keppra with renal dosing - consider decreased dosing due to somnolence Head CT negative for acute process EEG suggestive of mild generalized encephalopathic process, possibly related to underlying structural brain injury vs metabolic abnormality Monitor closely Remain in CVICU 
  
Anticoagulation for LVAD, INR goal 2-3 Continue ASA INR 1.6 - decreased rapidly with initiation of milrinone 4 mg warfarin tonight  
  
Acute Respiratory Failure post op Improved with aggressive diuresis, but still c/o dyspnea CXR and O2 requirement improved ABG acceptable TTE with Bubble study negative for PFO Pulmonary Consult appreciated Pulmonary hygiene Cont home CPAP- must use while sleeping Thoracentesis with pigtail cath - minimal drainage Acute on CKD 3, atrophic left kidney Appreciate Nephrology assistance Watch Cr - improving Daily diuretic dosing Avoid nephrotoxins, trend labs Renally dose meds  
  
CAD s/p CABG Cont low dose ASA- watch platelets No BB d/t RV failure Hold statin due to recent hepatic failure LHC performed 11/13 - low likelihood of benefit from revascularization  
  
Hx of VF arrest s/p BiV-ICD No recent shocks Keep K > 4 and Mg > 2  
   
PAF Dig level 0.8 - resume low dose dig today (62.5 mcg qMWF) No BB due to RV failure Repeat TFTs WNL 
  
Hx of gout Uric acid WNL Acute blood loss anemia Likely due to hematuria Begin octreotide 25 mcg SQ TID Transfuse today - Hgb 7.5 Check FOB , UA 
  
Suspected aspiration pneumonia Sputum culture showing scant growth of yeast 
Discontinue Zosyn Begin cefepime for UTI - should cross cover PNA Urinary retention, c/b serratia UTI and hematuria Appreciate Urology consult - asked to see again due to new hematuria, suspected recurrence of UTI Repeat UA with cx Begin cefepime Cystoscopy performed 11/13 shows catheter induced cystitis Malnutrition Appreciate Nutritionist consult Prealbumin weekly - 10.1 on 12/9 Continue pureed diet with nectar thick liquids Cont TF via dobhoff - at goal  
Intolerant of mirtazapine d/t tremors, confusion  
  
COPD Appreciate Pulmonology assistance Continue nebs PRN   
  
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  
  
Hx of sternal wound infection, sternectomy Sternum closed post op- monitor  
  
Vocal cord paralysis Improved ENT recs appreciated Neck CT- R neck edema, no airway compromise Speech therapy following - appreciate input 
  
Debility Continue PT/OT  
  
Ppx Protonix PT/OT Bowel regimen Continue pureed diet with nectar thick liquids PICC placed 11/22/19  
  
Dispo: Will need IP rehab. Transfer to CVSU. LVAD INTERROGATION: 
Device interrogated in person Device function normal, normal flow, no events LVAD Pump Speed (RPM): 1915 Pump Flow (LPM): 4.8 MAP: 82 
PI (Pulsitility Index): 3.5 Power: 4.2  Test: No 
Back Up  at Bedside & Labeled: Yes Power Module Test: No 
Driveline Site Care: No 
Driveline Dressing: Clean, Dry, and Intact Outpatient: No 
MAP in Therapeutic Range (Outpatient): Yes Testing Alarms Reviewed: Yes 
Back up SC speed: 5600 Back up Low Speed Limit: 5200 Emergency Equipment with Patient?: Yes Emergency procedures reviewed?: Yes Drive line site inspected?: No 
Drive line intergrity inspected?: Yes Drive line dressing changed?: No 
 
PHYSICAL EXAM: 
Visit Vitals BP 91/72 (BP 1 Location: Left arm, BP Patient Position: At rest) Pulse 81 Temp 98.6 °F (37 °C) Resp 22 Ht 6' 2\" (1.88 m) Wt 199 lb 8.3 oz (90.5 kg) SpO2 100% BMI 25.62 kg/m² Physical Exam  
Constitutional: He appears well-developed. He appears lethargic. He appears cachectic. No distress. More fatigued today HENT:  
Head: Normocephalic. Neck: Normal range of motion. Neck supple. No JVD present. Cardiovascular: Regular rhythm. + VAD hum Pulmonary/Chest: Effort normal and breath sounds normal. No respiratory distress. Abdominal: Soft. Bowel sounds are normal. He exhibits no distension. Dobhoff in place Genitourinary:    Genitourinary Comments: Hematuria Musculoskeletal: Normal range of motion. General: No edema. Neurological: He appears lethargic. Skin: Skin is warm and dry. Nursing note and vitals reviewed. REVIEW OF SYSTEMS: 
Review of Systems Constitutional: Positive for malaise/fatigue. Negative for chills and fever. HENT: Positive for hearing loss. Respiratory: Positive for shortness of breath. Negative for cough. Mild dyspnea Cardiovascular: Negative for chest pain, palpitations, orthopnea and leg swelling. Gastrointestinal: Negative for heartburn, nausea and vomiting. Genitourinary: Negative. Musculoskeletal: Negative. Neurological: Positive for weakness. Negative for dizziness and headaches. Endo/Heme/Allergies: Negative. PAST MEDICAL HISTORY: 
Past Medical History:  
Diagnosis Date  Degenerative disc disease, lumbar  Heart failure ([facility] [address]  High cholesterol  Hypertension  Paroxysmal atrial fibrillation ([facility] [address] 4/2/2019  Spinal stenosis PAST SURGICAL HISTORY: 
Past Surgical History:  
Procedure Laterality Date  COLONOSCOPY Left 11/11/2019 COLONOSCOPY at bedside performed by Mari Hernández MD at [facility] 391  HX CORONARY ARTERY BYPASS GRAFT    
 triple  HX HERNIA REPAIR    
 HX IMPLANTABLE CARDIOVERTER DEFIBRILLATOR  SC CARDIOVERSION ELECTIVE ARRHYTHMIA EXTERNAL N/A 6/10/2019 EP CARDIOVERSION performed by Kendall Stallworth MD at [place] [address], [facility] [address] CATH LAB  SC CARDIOVERSION ELECTIVE ARRHYTHMIA EXTERNAL N/A 6/18/2019 EP CARDIOVERSION performed by Bradly Slade MD at [place] [address], [facility] [address] CATH LAB  SC INSJ/RPLCMT PERM DFB W/TRNSVNS LDS 1/DUAL CHMBR N/A 6/21/2019 INSERT ICD BIV MULTI performed by Manuela Soriano MD at [place] [address], [facility] [address] CATH LAB  SC TCAT IMPL WRLS P-ART PRS SNR L-T HEMODYN MNTR N/A 9/18/2019 IMPLANT HEART FAILURE MONITORING DEVICE performed by Jannie Melton MD at [place] [address], [facility] [address] CATH LAB FAMILY HISTORY: 
Family History Problem Relation Age of Onset  Lung Disease Mother  Hypertension Mother [facility] Arthritis-osteo Mother  Heart Disease Mother  Heart Disease Father  Diabetes Father SOCIAL HISTORY: 
Social History Socioeconomic History  Marital status:  Spouse name: Not on file  Number of children: Not on file  Years of education: Not on file  Highest education level: Not on file Tobacco Use  Smoking status: Former Smoker Last attempt to quit: 2010 Years since quittin.0  Smokeless tobacco: Never Used Substance and Sexual Activity  Alcohol use: Not Currently Comment: rarely  Drug use: Never Other Topics Concern LABORATORY RESULTS: 
  
Labs Latest Ref Rng & Units 2019 2019 2019 12/10/2019 2019 2019 2019 WBC 4.1 - 11.1 K/uL 4.7 4.5 4.2 4.8 5.2 5.5 5.1  
RBC 4.10 - 5.70 M/uL 2.56(L) 2.63(L) 2.64(L) 2.63(L) 2.67(L) 2.68(L) 2.85(L) Hemoglobin 12.1 - 17.0 g/dL 7. 5(L) 7. 8(L) 7. 8(L) 7. 8(L) 7. 9(L) 8.0(L) 8.4(L) Hematocrit 36.6 - 50.3 % 25. 0(L) 25. 9(L) 26. 3(L) 26. 7(L) 26. 9(L) 26. 9(L) 28. 3(L) MCV 80.0 - 99.0 FL 97.7 98.5 99. 6(H) 101. 5(H) 100. 7(H) 100. 4(H) 99. 3(H) Platelets 864 - 147 K/uL 157 149(L) 131(L) 138(L) 134(L) 146(L) 174 Lymphocytes 12 - 49 % - - - - - - - Monocytes 5 - 13 % - - - - - - - Eosinophils 0 - 7 % - - - - - - - Basophils 0 - 1 % - - - - - - - Albumin 3.5 - 5.0 g/dL 2. 5(L) 2. 5(L) 2. 4(L) 2. 6(L) 2. 6(L) 2. 9(L) 3. 1(L) Calcium 8.5 - 10.1 MG/DL 9.0 9.0 8.5 8.8 9.0 8.6 9.4 SGOT 15 - 37 U/L 18 16 15 18 15 14(L) 12(L) Glucose 65 - 100 mg/dL 123(H) 105(H) 97 96 120(H) 142(H) 145(H) BUN 6 - 20 MG/DL 39(H) 38(H) 38(H) 40(H) 39(H) 34(H) 29(H) Creatinine 0.70 - 1.30 MG/DL 2.44(H) 2.56(H) 2.72(H) 3.08(H) 3.43(H) 3.46(H) 3.01(H) Sodium 136 - 145 mmol/L 140 140 142 143 143 147(H) 147(H) Potassium 3.5 - 5.1 mmol/L 3.9 4.0 4.1 4.2 4.2 3.9 3.2(L) TSH 0.36 - 3.74 uIU/mL - - - - - - -  
PSA 0.01 - 4.0 ng/mL - - - - - - -  
LDH 85 - 241 U/L 267(H) 249(H) 240 257(H) 257(H) 247(H) 250(H) CEA ng/mL - - - - - - - Some recent data might be hidden Lab Results Component Value Date/Time  TSH 2.30 2019 03:29 AM  
 TSH 2.40 10/25/2019 07:39 PM  
 TSH 2.45 2019 04:16 AM  
 
 ALLERGY: 
No Known Allergies CURRENT MEDICATIONS: 
Current Facility-Administered Medications Medication Dose Route Frequency  magnesium oxide (MAG-OX) tablet 400 mg  400 mg Oral BID  bumetanide (BUMEX) injection 2 mg  2 mg IntraVENous ONCE  
 diphenhydrAMINE (BENADRYL) capsule 25 mg  25 mg Oral QHS PRN  
 octreotide (SANDOSTATIN) injection 25 mcg  25 mcg SubCUTAneous TID  benzocaine-menthol (CEPACOL) lozenge 1 Lozenge  1 Lozenge Mucous Membrane PRN  
 cefepime (MAXIPIME) 2 g in 0.9% sodium chloride (MBP/ADV) 100 mL  2 g IntraVENous Q24H  warfarin (COUMADIN) tablet 4 mg  4 mg Oral ONCE  
 milrinone (PRIMACOR) 20 MG/100 ML D5W infusion  0.2 mcg/kg/min IntraVENous CONTINUOUS  
 levETIRAcetam (KEPPRA) oral solution 250 mg  250 mg Oral Q12H  pantoprazole (PROTONIX) tablet 40 mg  40 mg Oral ACB  aspirin chewable tablet 81 mg  81 mg Oral DAILY  0.9% sodium chloride infusion  3 mL/hr IntraVENous CONTINUOUS  
 albuterol-ipratropium (DUO-NEB) 2.5 MG-0.5 MG/3 ML  3 mL Nebulization Q4H RT  
 allopurinol (ZYLOPRIM) tablet 100 mg  100 mg Oral DAILY  sildenafil (REVATIO) 2 mg/mL oral suspension 20 mg  20 mg Oral Q8H  
 arformoterol (BROVANA) neb solution 15 mcg  15 mcg Nebulization BID RT And  
 budesonide (PULMICORT) 500 mcg/2 ml nebulizer suspension  500 mcg Nebulization BID RT  
 artificial saliva (MOUTH KOTE) 1 Spray  1 Spray Oral PRN  
 lactobac ac& pc-s.therm-b.anim (TIAN Q/RISAQUAD)  1 Cap Oral DAILY  oxyCODONE IR (ROXICODONE) tablet 5 mg  5 mg Oral Q6H PRN  
 balsam peru-castor oil (VENELEX) ointment   Topical TID  tamsulosin (FLOMAX) capsule 0.4 mg  0.4 mg Oral DAILY  insulin lispro (HUMALOG) injection   SubCUTAneous AC&HS  Warfarin MD/NP dosing   Other PRN  
 epoetin yvonne-epbx (RETACRIT) injection 20,000 Units  20,000 Units SubCUTAneous Q7D  
 sodium chloride (NS) flush 5-40 mL  5-40 mL IntraVENous Q8H  
  sodium chloride (NS) flush 5-40 mL  5-40 mL IntraVENous PRN  
 acetaminophen (TYLENOL) tablet 650 mg  650 mg Oral Q6H PRN  
 naloxone (NARCAN) injection 0.4 mg  0.4 mg IntraVENous PRN  
 ondansetron (ZOFRAN) injection 4 mg  4 mg IntraVENous Q4H PRN  
 senna-docusate (PERICOLACE) 8.6-50 mg per tablet 1 Tab  1 Tab Oral DAILY  bisacodyl (DULCOLAX) tablet 5 mg  5 mg Oral DAILY PRN  
 sucralfate (CARAFATE) tablet 1 g  1 g Oral AC&HS  influenza vaccine 2019-20 (6 mos+)(PF) (FLUARIX/FLULAVAL/FLUZONE QUAD) injection 0.5 mL  0.5 mL IntraMUSCular PRIOR TO DISCHARGE  hydrALAZINE (APRESOLINE) 20 mg/mL injection 20 mg  20 mg IntraVENous Q6H PRN  
 dextrose 10 % infusion 125-250 mL  125-250 mL IntraVENous PRN Thank you for allowing me to participate in this patient's care. Fabian Zhu, TIERRA [place] [address] Phone: [phone] Fax: [phone] [facility] ATTENDING ADDENDUM Patient was seen and examined in person. Data and notes were reviewed. I have discussed and agree with the plan as noted in the NP note above without further additions. Brandy Rizvi MD PhD 
[facility] [address] [facility]

## 2019-12-13 NOTE — PROGRESS NOTES
Physical Therapy 12/13/2019 Assisted patient back to bed with Fazal Lakhani RN. Patient required moderate assist x 2 secondary to frequent staggering/LOB episodes (ataxia). Assisted patient with VAD power switchover from batteries > wall power (patient able to verbalize appropriate steps). Will continue to follow per PT plan of care (5x/week). Thank you. Kelvin Jacobo, PT, DPT Time Spent: 9 minutes

## 2019-12-13 NOTE — PROGRESS NOTES
Chart reviewed and noted patient receiving blood at this time through PICC line and RN requesting to hold. Will follow up for OT intervention as able. Thank you.

## 2019-12-13 NOTE — PROGRESS NOTES
Day #1 of Cefepime Indication:  severe UTI Current regimen:  1 gram Q12hr Abx regimen: _ Recent Labs 19 
0300 19 
0403 19 
0435 WBC 4.7 4.5 4.2 CREA 2.44* 2.56* 2.72* BUN 39* 38* 38* Est CrCl: ~ 35 ml/min; UO:~ ml/kg/hr Temp (24hrs), Av.6 °F (37 °C), Min:98 °F (36.7 °C), Max:99.1 °F (37.3 °C) Plan: Change to 2 grams Q 24 hr per dosing protocol for UTI

## 2019-12-13 NOTE — PROGRESS NOTES
Problem: Falls - Risk of 
Goal: *Absence of Falls Description Document Lee Goodrich Fall Risk and appropriate interventions in the flowsheet. Outcome: Progressing Towards Goal 
Note: Fall Risk Interventions: 
Mobility Interventions: Communicate number of staff needed for ambulation/transfer, OT consult for ADLs, PT Consult for mobility concerns, PT Consult for assist device competence Mentation Interventions: Adequate sleep, hydration, pain control, Toileting rounds, Update white board Medication Interventions: Evaluate medications/consider consulting pharmacy Elimination Interventions: Call light in reach, Toileting schedule/hourly rounds, Urinal in reach History of Falls Interventions: Consult care management for discharge planning Problem: Diabetes Self-Management Goal: *Using medications safely Description State effect of diabetes medications on diabetes; name diabetes medication taking, action and side effects. Outcome: Progressing Towards Goal 
Goal: *Monitoring blood glucose, interpreting and using results Description Identify recommended blood glucose targets  and personal targets. Outcome: Progressing Towards Goal 
Goal: *Prevention, detection, treatment of acute complications Description List symptoms of hyper- and hypoglycemia; describe how to treat low blood sugar and actions for lowering  high blood glucose level. Outcome: Progressing Towards Goal 
  
Problem: Pressure Injury - Risk of 
Goal: *Prevention of pressure injury Description Document Mich Scale and appropriate interventions in the flowsheet. Outcome: Progressing Towards Goal 
Note: Pressure Injury Interventions: 
Sensory Interventions: Avoid rigorous massage over bony prominences, Float heels, Keep linens dry and wrinkle-free, Minimize linen layers Moisture Interventions: Apply protective barrier, creams and emollients, Internal/External urinary devices, Maintain skin hydration (lotion/cream) Activity Interventions: Increase time out of bed, Pressure redistribution bed/mattress(bed type), PT/OT evaluation Mobility Interventions: HOB 30 degrees or less, Pressure redistribution bed/mattress (bed type), Turn and reposition approx. every two hours(pillow and wedges) Nutrition Interventions: Document food/fluid/supplement intake Friction and Shear Interventions: Lift sheet, Minimize layers Problem: Nutrition Deficit Goal: *Optimize nutritional status Outcome: Progressing Towards Goal 
  
Problem: Infection - Risk of, Urinary Catheter-Associated Urinary Tract Infection Goal: *Absence of infection signs and symptoms Outcome: Resolved/Met Problem: LVAD Post-op Day 15 to Discharge Goal: Activity/Safety Outcome: Progressing Towards Goal 
Goal: Nutrition/Diet Outcome: Progressing Towards Goal 
Goal: Medications Outcome: Progressing Towards Goal 
Goal: Respiratory Outcome: Progressing Towards Goal 
Goal: Psychosocial 
Outcome: Progressing Towards Goal 
  
Problem: Impaired Skin Integrity/Pressure Injury Treatment Goal: *Improvement of Existing Pressure Injury Outcome: Progressing Towards Goal

## 2019-12-13 NOTE — PROGRESS NOTES
JESÚS Plan: 
   Patient medically progressing s/p LVAD, post op acute respiratory failure,  kristofer Disposition: CM anticipates Inpatient Rehab for discharge; continuing to monitor CM provided update by [facility] that patient is transferring from CVICU to [place] [id number] today for progression of care. CM continuing to monitor improvements with therapy and clinical team. 
 
CM will continue to follow.  
 
Valerie Mortimer, MPH

## 2019-12-13 NOTE — PROGRESS NOTES
0800: Bedside report received from [facility], [facility] care of pt. 
 
1357: 1 unit PRBCs started. 1400: Echo tech at bedside. 1527: Neal removed. 1645: PRBCs completed. 1800: Incontinent of urine s/p neal removal - pt states he forgot he didn't have a catheter. Incontinence care provided. Condom cath place per pt request. 
 
1900: Tube feeds started. 2000: Bedside and Verbal shift change report given to Nisha Arcos (oncoming nurse) by Brandon Fisher (offgoing nurse). Report included the following information SBAR, Kardex, [facility], Recent Results and Cardiac Rhythm Paced.

## 2019-12-13 NOTE — PROGRESS NOTES
1220 TRANSFER - IN REPORT: 
 
Verbal report received from [facility], [facility] (name) on [employer]  being received from CVICU (unit) for routine progression of care Report consisted of patients Situation, Background, Assessment and  
Recommendations(SBAR). Information from the following report(s) SBAR, Kardex, Procedure Summary, Intake/Output, MAR, Recent Results, and Med Rec Status was reviewed with the receiving nurse. Opportunity for questions and clarification was provided. Assessment completed upon patients arrival to unit and care assumed. 1350 Patient's milrinone restarted. Patient had an increase in work of breathing. RR 25.  Put patient back to bed, did VS, and updated Cailin Finch NP on patient status. 1500 Patient scratched nose and it began bleeding. Lasted for a few minutes. Patient condom cath keeps falling off. Bed, gown, comdum cath changed frequently. 1930 Bedside shift change report given to [facility], RN  (oncoming nurse) by Evert Whitney RN  (offgoing nurse). Report included the following information SBAR, Kardex, Procedure Summary, Intake/Output, MAR, Recent Results and Med Rec Status. Mouth care done. Problem: Falls - Risk of 
Goal: *Absence of Falls Description Document Francia Swift Fall Risk and appropriate interventions in the flowsheet. Outcome: Progressing Towards Goal 
Note: Fall Risk Interventions: 
Mobility Interventions: Communicate number of staff needed for ambulation/transfer, OT consult for ADLs, Patient to call before getting OOB, Strengthening exercises (ROM-active/passive), Utilize gait belt for transfers/ambulation Mentation Interventions: Adequate sleep, hydration, pain control, Door open when patient unattended, Gait belt with transfers/ambulation, Increase mobility, More frequent rounding, Reorient patient, Room close to nurse's station, Update white board, Toileting rounds Medication Interventions: Teach patient to arise slowly, Patient to call before getting OOB Elimination Interventions: Call light in reach, Elevated toilet seat, Patient to call for help with toileting needs, Stay With Me (per policy), Toileting schedule/hourly rounds History of Falls Interventions: Door open when patient unattended, Room close to nurse's station, Utilize gait belt for transfer/ambulation Problem: Pain Goal: *Control of Pain Outcome: Progressing Towards Goal 
Goal: *PALLIATIVE CARE:  Alleviation of Pain Outcome: Progressing Towards Goal 
  
Problem: Patient Education: Go to Patient Education Activity Goal: Patient/Family Education Outcome: Progressing Towards Goal 
  
Problem: Pressure Injury - Risk of 
Goal: *Prevention of pressure injury Description Document Mich Scale and appropriate interventions in the flowsheet. Outcome: Progressing Towards Goal 
Note: Pressure Injury Interventions: 
Sensory Interventions: Avoid rigorous massage over bony prominences, Float heels, Keep linens dry and wrinkle-free, Minimize linen layers Moisture Interventions: Apply protective barrier, creams and emollients, Internal/External urinary devices, Maintain skin hydration (lotion/cream) Activity Interventions: Increase time out of bed, Pressure redistribution bed/mattress(bed type) Mobility Interventions: Chair cushion, Float heels, HOB 30 degrees or less, Pressure redistribution bed/mattress (bed type), Turn and reposition approx. every two hours(pillow and wedges) Nutrition Interventions: Document food/fluid/supplement intake Friction and Shear Interventions: HOB 30 degrees or less, Lift sheet Problem: Heart Failure: Discharge Outcomes Goal: *Verbalizes understanding/describes prescribed medications Outcome: Progressing Towards Goal 
Goal: *Describes available resources and support systems Description 
(eg: Home Health, Palliative Care, Advanced Medical Directive) Outcome: Progressing Towards Goal 
Goal: *Describes smoking cessation resources Outcome: Progressing Towards Goal 
Goal: *Understands and describes signs and symptoms to report to providers(Stroke Metric) Outcome: Progressing Towards Goal 
Goal: *Describes/verbalizes understanding of follow-up/return appt Description 
(eg: to physicians, diabetes treatment coordinator, and other resources Outcome: Progressing Towards Goal 
  
Problem: Diabetes Self-Management Goal: *Disease process and treatment process Description Define diabetes and identify own type of diabetes; list 3 options for treating diabetes. Outcome: Progressing Towards Goal 
Goal: *Incorporating nutritional management into lifestyle Description Describe effect of type, amount and timing of food on blood glucose; list 3 methods for planning meals. Outcome: Progressing Towards Goal 
Goal: *Incorporating physical activity into lifestyle Description State effect of exercise on blood glucose levels. Outcome: Progressing Towards Goal 
Goal: *Developing strategies to promote health/change behavior Description Define the ABC's of diabetes; identify appropriate screenings, schedule and personal plan for screenings. Outcome: Progressing Towards Goal 
  
Problem: Patient Education: Go to Patient Education Activity Goal: Patient/Family Education Outcome: Progressing Towards Goal 
  
Problem: Nutrition Deficit Goal: *Optimize nutritional status Outcome: Progressing Towards Goal 
  
Problem: Nutrition Deficit Goal: *Optimize nutritional status Outcome: Progressing Towards Goal 
  
Problem: LVAD: Discharge Outcomes Goal: *Hemodynamically stable Outcome: Progressing Towards Goal 
Goal: *Tolerating diet Outcome: Progressing Towards Goal

## 2019-12-13 NOTE — PROGRESS NOTES
1930 - Bedside and Verbal shift change report given to Yolanda Hernandez RN (oncoming nurse) by Mera Putnam RN (offgoing nurse). Report included the following information SBAR, Kardex, Intake/Output, MAR, Recent Results, Cardiac Rhythm Paced and Alarm Parameters . Medications verified and pt assessed. Pt sitting up in chair resting. 2000 - Pt ambulated back to bed from chair with 2x assist - pt tolerated transfer well. 0000 - Pt awakened and states he \"is ready for his cpap. \"  Home cpap mask applied to pt with 4L o2 bled into machine. Pt tolerating cpap mask. 0730 - Bedside and Verbal shift change report given to Samira Grossman RN (oncoming nurse) by Yolanda Hernandez RN (offgoing nurse). Report included the following information SBAR, Kardex, Intake/Output, MAR, Recent Results, Cardiac Rhythm paced and Alarm Parameters .

## 2019-12-13 NOTE — CONSULTS
Requesting Provider: Vika Ibarra MD - Reason for Consultation: \"gross hematuria/urinary retention\" Pre-existing [facility] Patient:   No 
 
         
 
Patient: Nickie Reddy MRN: [id number]  SSN: [id number] YOB: 1950  Age: 71 y.o. Sex: male Location: [facility] Code Status: Prior PCP: Caryl Adrian MD  - [phone] Emergency Contact:  Primary Emergency Contact: Saige Barba, Home Phone: [phone] Race/Nondenominational/Language: WHITE OR  /  / Speaks ENGLISH Payor: Payor: Geneva Malone / Plan: [facility] MEDICARE PART A & B / Product Type: Medicare /   
Prior Admission Data: 9/18/19 [facility] CARDIAC CATH LAB Miles Garza Hospitalized:  Hospital Day: 50 - Admitted 10/25/2019  2:14 PM  
POD # 18 Days Post-Op Procedure(s): LEFT BRACHIAL THROMBECTOMY by Aurora Ann MD - Blood Loss: * No values recorded between 11/25/2019  4:01 PM and 11/25/2019  4:40 PM * 0 Hr 61 Min 29 Sec [employer]ULTANTS 
IP CONSULT TO PULMONOLOGY 
IP CONSULT TO PULMONOLOGY 
IP CONSULT TO GASTROENTEROLOGY 
IP CONSULT TO HEMATOLOGY 
IP CONSULT TO INFECTIOUS DISEASES 
IP CONSULT TO UROLOGY 
IP CONSULT TO GASTROENTEROLOGY 
IP CONSULT TO NEUROLOGY 
IP CONSULT TO OTOLARYNGOLOGY 
IP CONSULT TO NEUROLOGY 
IP CONSULT TO UROLOGY ADMISSION DIAGNOSES 
  ICD-10-CM ICD-9-CM 1. Acute on chronic systolic CHF (congestive heart failure) (HCC) I50.23 428.23  
  428.0 2. Paroxysmal atrial fibrillation (HCC) I48.0 427.31  
3. Peripheral vascular disease (HCC) I73.9 443.9 4. Stage 3 chronic kidney disease (HCC) N18.3 585.3 5. Single kidney Z90.5 V45.73  
6. History of urinary retention Z87.898 V13.09  
7. H/O ventricular fibrillation Z86.79 V12.59  
8. JOSE on CPAP G47.33 327.23  
 Z99.89 V46.8 9. Systolic CHF, chronic (HCC) I50.22 428.22  
  428.0 10. Mediastinal lymphadenopathy R59.0 785.6 11. Vocal cord paralysis J38.00 478.30  
12.  Heart failure, systolic, acute on chronic (HCC) I50.23 428.23  
 13. Shock, cardiogenic ([facility] R57.0 785.51  
14. Receiving inotropic medication Z79.899 V49.89  
15. Malnutrition, unspecified type ([facility] E46 263.9 16. Preoperative evaluation of a medical condition to rule out surgical contraindications (TAR required) Z01.818 V72.83  
17. Acute decompensated heart failure ([facility]) I50.9 428.0 18. Iron deficiency E61.1 280.9 19. Thrombocytopenia ([facility]) D69.6 287.5 20. Physical deconditioning R53.81 799.3 21. Cachexia ([facility] R64 799.4 22. Frailty syndrome in geriatric patient W05 148  
43. Acute cystitis with hematuria N30.01 595.0 24. Anticoagulation management encounter Z51.81 V58.83  
 Z79.01 V58.61  
25. Gross hematuria R31.0 599.71  
26. LVAD (left ventricular assist device) present ([facility] Z95.811 V43.21  
27. Ischemic cardiomyopathy I25.5 414.8 28. Median nerve dysfunction, left G56.12 354.1 29. Hypervolemia, unspecified hypervolemia type E87.70 276.69  
30. RVF (right ventricular failure) ([facility]) I50.810 428.0 31. Right arm pain M79.601 729.5 32. Acute respiratory failure, unspecified whether with hypoxia or hypercapnia ([facility]) J96.00 518.81  
33. 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 
- monitor h/h 
- if able to hold blood thinners, recommended 
- continue to ensure external catheter is not causing trauma/skin breakdown 
- hx of cystoscopy on 10/25 that was normal 
 
· History of urinary retention 
-Cr improving. CC: No chief complaint on file. HPI: He is a 71 y.o. male w/ hx of single kidney, CKD stage 3, chronic urinary retention, CHF, s/p LVAD, who has been hospitalized for several weeks for heart failure.  Pt is known at [facility], cystoscopy performed 10/25/2019 showing catheter related cystitis changes of the posterior wall of the bladder but no suggestion of tumor or anything worrisome. . Urology consulted today for return of gross hematuria, urinary retention. Currently on aspirin and Warfarin. RN reports the patients neal catheter was removed today, that was clear, and since removal he has experienced gross hematuria via his condom cath. Penis and meatus appear normal with no breakdown. Patient was experiencing a nosebleed as well during assessment. Afebrile. WBC 4.7. Hgb 7.5. Hx of 7.5-8.4 range for hemoglobin BUN 39/Cr 2.44, improving. External condom catheter in place. UA from 10/31 showed >100 RBCs. UA from  is showing 0-5 RBCs UTI--Serratia (2 biotypes) from culture and small amount of Enterococcus per ID. On abx. Zosyn, maxipime. CT abd/pelvis 10/25/2019:  Left renal atrophy. Moderate bladder distention with diverticula. 
 
______________________________________________________ Operative report from 10/25/2019   Dr. Mary Heath [facility] - OP NOTES Patient: Alma Rosa Nichols 
: 1950 Date of Service: 10/25/2019 2:14:00 PM 
 
[address] 
OPERATIVE REPORT Name:  Lyndsey Gong 
MR#:  [id number] :  1950 ACCOUNT #:  [de-identified] DATE OF SERVICE:  2019 PREOPERATIVE DIAGNOSIS:  Gross hematuria. POSTOPERATIVE DIAGNOSIS/FINDINGS:  There were catheter related cystitis changes 
of the posterior wall of the bladder but I did not see any suggestion of tumor 
or anything worrisome. There was no active bleeding. PROCEDURE PERFORMED:  Cystoscopy. SURGEON:  Carly Pacheco MD 
 
ASSISTANT:  None. ANESTHESIA:  MAC. COMPLICATIONS:  None. SPECIMENS REMOVED:  None. IMPLANTS:  None. ESTIMATED BLOOD LOSS:  Minimal. 
 
DRAINS:  An 18-Yakut Neal catheter. INDICATIONS FOR PROCEDURE:  The patient is a 42-year-old gentleman with heart 
failure, who is being scheduled for placement of left ventricular assist device. He had significant gross hematuria earlier this hospitalization possibly related 
to urinary tract infection. The urine has cleared but cystoscopy was 
recommended to exclude any worrisome findings in the bladder to account for the 
hematuria and he preferred to do that under anesthesia in case any additional 
procedures were needed at the time. He was therefore transferred down from the 
CCU for the procedure. DESCRIPTION:  He was identified, as mentioned, escorted from the CCU into the 
cystoscopy room. Monitored anesthesia care was administered by the Anesthesia 
team, and he was then placed in dorsal lithotomy. His previous catheter was 
removed. He was then prepped and draped in standard fashion. With a 21-Azerbaijani 
sheath and both of 30 and 70-degree lens, a cystoscopy was performed. The 
anterior urethra was normal.  The prostate was short and small and did not 
appear obstructive, however, there was prominent vasculature throughout the 
prostate. The bladder was then examined closely with both lenses. Isolated to 
the posterior wall at the site of the catheter was what appeared to be catheter 
related cystitis changes. I did not see anything that looked like tumor. The 
bladder was moderately trabeculated throughout and there were few small 
cellules. The trigone was normal.  Both ureteral orifices were normal.  I did 
not see anything that I felt warranted biopsy or any additional procedures. I 
removed the cystoscope and passed an 18-Azerbaijani Lizama catheter. He tolerated 
theprocedure well. Findings were conveyed to his wife. MD FERNANDA [email or web address] 
D:  11/13/2019 8:22 T:  11/13/2019 10:38 
JOB #:  [id number] Signed before import by Doreen Bruno MD 
Filed automatically on 12/10/2019 at 10:15 AM 
 
Problem: hematuria; Location: bladder; Severity: mild; Timing:intermittent, Context: as above; Better/Worse: none, Associated s/s:as above Temp (24hrs), Av.6 °F (37 °C), Min:98 °F (36.7 °C), Max:99 °F (37.2 °C) Urinary Status: Voiding, External catheter Creatinine Date/Time Value Ref Range Status 2019 03:00 AM 2.44 (H) 0.70 - 1.30 MG/DL Final  
2019 04:03 AM 2.56 (H) 0.70 - 1.30 MG/DL Final  
2019 04:35 AM 2.72 (H) 0.70 - 1.30 MG/DL Final  
12/10/2019 04:12 AM 3.08 (H) 0.70 - 1.30 MG/DL Final  
2019 04:38 AM 3.43 (H) 0.70 - 1.30 MG/DL Final  
 
Current Antimicrobial Therapy (168h ago, onward) Ordered     Start Stop  
 19 1109  cefepime (MAXIPIME) 2 g in 0.9% sodium chloride (MBP/ADV) 100 mL  2 g,   IntraVENous,   EVERY 24 HOURS    
 19 1300 19 1259 Key Anti-Platelet Anticoagulant Meds   
    
  
 apixaban (ELIQUIS) 5 mg tablet (Taking) Take 5 mg by mouth two (2) times a day. aspirin delayed-release 81 mg tablet Take 81 mg by mouth daily. Diet: DIET NUTRITIONAL SUPPLEMENTS All Meals; Ensure [employer] DIET PUREED 2 [place]/2 Mildly Thick; No Conc. Sweets DIET TUBE FEEDING Osmolite 1.5 @ 90ml/hr x 12hrs (7p to 7a) + 30ml flush q4hr during feeds - % Diet Eaten: 75 % Labs Lab Results Component Value Date/Time Lactic acid 1.0 2019 03:00 AM  
 Lactic acid 1.3 2019 04:03 AM  
 Lactic acid 1.1 2019 04:35 AM  
 WBC 4.7 2019 03:00 AM  
 HCT 25.0 (L) 2019 03:00 AM  
 PLATELET 292 101 03:00 AM  
 Sodium 140 2019 03:00 AM  
 Potassium 3.9 2019 03:00 AM  
 Chloride 105 2019 03:00 AM  
 CO2 29 2019 03:00 AM  
 BUN 39 (H) 2019 03:00 AM  
 Creatinine 2.44 (H) 2019 03:00 AM  
 Glucose 123 (H) 2019 03:00 AM  
 Calcium 9.0 2019 03:00 AM  
 Magnesium 1.9 2019 03:00 AM  
 INR 1.6 (H) 2019 03:00 AM  
 Prostate Specific Ag 0.3 10/25/2019 02:56 PM  
 
UA:  
Lab Results Component Value Date/Time  Color YELLOW/STRAW 2019 03:05 PM  
 Appearance CLEAR 2019 03:05 PM  
 Specific gravity 1.005 11/25/2019 03:05 PM  
 Specific gravity 1.015 10/31/2019 11:00 AM  
 pH (UA) 5.5 11/25/2019 03:05 PM  
 Protein NEGATIVE  11/25/2019 03:05 PM  
 Glucose NEGATIVE  11/25/2019 03:05 PM  
 Ketone NEGATIVE  11/25/2019 03:05 PM  
 Bilirubin NEGATIVE  11/25/2019 03:05 PM  
 Urobilinogen 0.2 11/25/2019 03:05 PM  
 Nitrites NEGATIVE  11/25/2019 03:05 PM  
 Leukocyte Esterase NEGATIVE  11/25/2019 03:05 PM  
 Epithelial cells FEW 11/25/2019 03:05 PM  
 Bacteria NEGATIVE  11/25/2019 03:05 PM  
 WBC 0-4 11/25/2019 03:05 PM  
 RBC 0-5 11/25/2019 03:05 PM  
 
Imaging Results for orders placed during the hospital encounter of 10/25/19 CT NECK SOFT TISSUE WO CONT Narrative Clinical indication: Mass. Axial CT scan of the neck obtained without intravenous contrast. Coronal and 
sagittal reconstructions. No prior. CT dose reduction was achieved through the 
use of a standardized protocol tailored for this examination and automatic 
exposure control for dose modulation . Patient has an NG tube. Visualized orbits appear unremarkable. Mucosal thickening seen in the left 
maxillary sinus. Parotid and submandibular glands show no significant abnormalities. There is no compromise of the airway. No definite evidence for adenopathy, masses or fluid collection. There is 
diffuse soft tissue swelling in the a right neck. Thyroid show no discrete mass. Central venous catheters  in place. Tip is not included but likely in the 
superior vena cava. Impression IMPRESSION: Soft tissue swelling on the right. No fluid collection or compromise 
of the airway. US Results (most recent): 
Results from Hospital Encounter encounter on 10/25/19 US THORACENTESIS INSRT CHEST TUBE Narrative EXAM:  US THORACENTESIS INSRT CHEST TUBE INDICATION: Multiloculated right pleural effusion. FINDINGS: The procedure including the risk of pneumothorax and bleeding was explained to the patient and written informed consent was obtained. The 
procedure was performed portably at the bedside. Sonography was utilized to 
localize a site for right thoracentesis. The skin of the back was marked and 
prepped and draped in sterile fashion and anesthetized with 1% lidocaine. A 
8-Zambian Safe-T-Centesis catheter  was advanced into the left pleural space and 
clear fluid returned without difficulty. No laboratory analysis was ordered. The 
catheter was secured to the skin with an adhesive fixation device and attached 
to a Pleur-evac waterseal drainage device. The patient was monitored with pulse 
oximetry and EKG monitoring throughout the procedure. The patient tolerated the 
procedure well. A chest radiograph will be obtained to exclude pneumothorax. Impression IMPRESSION: Ultrasound guided right thoracentesis performed with 8-Zambian 
pigtail pleural catheter placed. Cultures All Micro Results Procedure Component Value Units Date/Time URINE CULTURE HOLD SAMPLE [[id number]] Order Status:  Sent Specimen:  Urine CULTURE, BLOOD, PAIRED [[id number]] Collected:  12/06/19 2323 Order Status:  Completed Specimen:  Blood Updated:  12/11/19 0522 Special Requests: NO SPECIAL REQUESTS Culture result: NO GROWTH 5 DAYS     
 CULTURE, RESPIRATORY/SPUTUM/BRONCH [place] [[id number]] Collected:  11/25/19 1815 Order Status:  Canceled Specimen:  Sputum URINE CULTURE HOLD SAMPLE [[id number]] Collected:  11/25/19 1505 Order Status:  Completed Specimen:  Urine from Serum Updated:  11/25/19 1647 Urine culture hold URINE ON HOLD IN MICROBIOLOGY DEPT FOR 3 DAYS. IF UNPRESERVED URINE IS SUBMITTED, IT CANNOT BE USED FOR ADDITIONAL TESTING AFTER 24 HRS, RECOLLECTION WILL BE REQUIRED. CULTURE, BLOOD, PAIRED [[id number]] Collected:  11/12/19 1118 Order Status:  Completed Specimen:  Blood Updated:  11/17/19 4147 Special Requests: NO SPECIAL REQUESTS Culture result: NO GROWTH 5 DAYS     
 CULTURE, URINE [[id number]]  (Abnormal)  (Susceptibility) Collected:  10/31/19 1005 Order Status:  Completed Specimen:  Urine from Lizama Specimen Updated:  11/04/19 1038 Special Requests: NO SPECIAL REQUESTS [place] Count --     
  >100,000 COLONIES/mL Culture result: SERRATIA MARCESCENS     
      
  SERRATIA MARCESCENS (2ND COLONY TYPE/STRAIN) ENTEROCOCCUS FAECALIS GROUP D  
     
 URINE CULTURE HOLD SAMPLE [[id number]] Collected:  10/31/19 1100 Order Status:  Completed Specimen:  Serum Updated:  10/31/19 1919 Urine culture hold URINE ON HOLD IN MICROBIOLOGY DEPT FOR 3 DAYS. IF UNPRESERVED URINE IS SUBMITTED, IT CANNOT BE USED FOR ADDITIONAL TESTING AFTER 24 HRS, RECOLLECTION WILL BE REQUIRED. CULTURE, URINE [[id number]] Collected:  10/31/19 1100 Order Status:  Canceled URINE CULTURE HOLD SAMPLE [[id number]] Collected:  10/31/19 1048 Order Status:  Canceled Specimen:  Urine CULTURE, BLOOD, PAIRED [[id number]] Collected:  10/25/19 1914 Order Status:  Completed Specimen:  Blood Updated:  10/30/19 1552 Special Requests: NO SPECIAL REQUESTS Culture result: NO GROWTH 5 DAYS URINE CULTURE HOLD SAMPLE [[id number]] Collected:  10/25/19 1730 Order Status:  Completed Specimen:  Urine from Serum Updated:  10/25/19 1747 Urine culture hold URINE ON HOLD IN MICROBIOLOGY DEPT FOR 3 DAYS. IF UNPRESERVED URINE IS SUBMITTED, IT CANNOT BE USED FOR ADDITIONAL TESTING AFTER 24 HRS, RECOLLECTION WILL BE REQUIRED. Past History: (Complete 2+/3 areas) No Known Allergies Current Facility-Administered Medications Medication Dose Route Frequency  magnesium oxide (MAG-OX) tablet 400 mg  400 mg Oral BID  bumetanide (BUMEX) injection 2 mg  2 mg IntraVENous ONCE  
 diphenhydrAMINE (BENADRYL) capsule 25 mg  25 mg Oral QHS PRN  
  octreotide (SANDOSTATIN) injection 25 mcg  25 mcg SubCUTAneous TID  benzocaine-menthol (CEPACOL) lozenge 1 Lozenge  1 Lozenge Mucous Membrane PRN  
 cefepime (MAXIPIME) 2 g in 0.9% sodium chloride (MBP/ADV) 100 mL  2 g IntraVENous Q24H  
 0.9% sodium chloride infusion 250 mL  250 mL IntraVENous PRN  
 milrinone (PRIMACOR) 20 MG/100 ML D5W infusion  0.2 mcg/kg/min IntraVENous CONTINUOUS  
 levETIRAcetam (KEPPRA) oral solution 250 mg  250 mg Oral Q12H  pantoprazole (PROTONIX) tablet 40 mg  40 mg Oral ACB  aspirin chewable tablet 81 mg  81 mg Oral DAILY  0.9% sodium chloride infusion  3 mL/hr IntraVENous CONTINUOUS  
 albuterol-ipratropium (DUO-NEB) 2.5 MG-0.5 MG/3 ML  3 mL Nebulization Q4H RT  
 allopurinol (ZYLOPRIM) tablet 100 mg  100 mg Oral DAILY  sildenafil (REVATIO) 2 mg/mL oral suspension 20 mg  20 mg Oral Q8H  
 arformoterol (BROVANA) neb solution 15 mcg  15 mcg Nebulization BID RT And  
 budesonide (PULMICORT) 500 mcg/2 ml nebulizer suspension  500 mcg Nebulization BID RT  
 artificial saliva (MOUTH KOTE) 1 Spray  1 Spray Oral PRN  
 lactobac ac& pc-s.therm-b.anim (TIAN Q/RISAQUAD)  1 Cap Oral DAILY  oxyCODONE IR (ROXICODONE) tablet 5 mg  5 mg Oral Q6H PRN  
 balsam peru-castor oil (VENELEX) ointment   Topical TID  tamsulosin (FLOMAX) capsule 0.4 mg  0.4 mg Oral DAILY  insulin lispro (HUMALOG) injection   SubCUTAneous AC&HS  Warfarin MD/NP dosing   Other PRN  
 epoetin yvonne-epbx (RETACRIT) injection 20,000 Units  20,000 Units SubCUTAneous Q7D  
 sodium chloride (NS) flush 5-40 mL  5-40 mL IntraVENous Q8H  
 sodium chloride (NS) flush 5-40 mL  5-40 mL IntraVENous PRN  
 acetaminophen (TYLENOL) tablet 650 mg  650 mg Oral Q6H PRN  
 naloxone (NARCAN) injection 0.4 mg  0.4 mg IntraVENous PRN  
 ondansetron (ZOFRAN) injection 4 mg  4 mg IntraVENous Q4H PRN  
 senna-docusate (PERICOLACE) 8.6-50 mg per tablet 1 Tab  1 Tab Oral DAILY  bisacodyl (DULCOLAX) tablet 5 mg  5 mg Oral DAILY PRN  
 sucralfate (CARAFATE) tablet 1 g  1 g Oral AC&HS  influenza vaccine 2019-20 (6 mos+)(PF) (FLUARIX/FLULAVAL/FLUZONE QUAD) injection 0.5 mL  0.5 mL IntraMUSCular PRIOR TO DISCHARGE  hydrALAZINE (APRESOLINE) 20 mg/mL injection 20 mg  20 mg IntraVENous Q6H PRN  
 dextrose 10 % infusion 125-250 mL  125-250 mL IntraVENous PRN Prior to Admission medications Medication Sig Start Date End Date Taking? Authorizing Provider  
sildenafil, pulm. hypertension, (REVATIO) 20 mg tablet Take 1 Tab by mouth three (3) times daily. 12/2/19  Yes Xiomara Alvarado NP  
apixaban (ELIQUIS) 5 mg tablet Take 5 mg by mouth two (2) times a day. Yes Provider, Historical  
escitalopram oxalate (LEXAPRO) 5 mg tablet TAKE 1 TABLET BY MOUTH EVERY DAY 10/10/19   Provider, Historical  
torsemide (DEMADEX) 20 mg tablet Take 3 Tabs by mouth two (2) times a day. 10/2/19   Ann Marie Santos MD  
tamsulosin (FLOMAX) 0.4 mg capsule Take 0.4 mg by mouth daily. 7/18/19   Provider, Historical  
carvedilol (COREG) 6.25 mg tablet Take 1 Tab by mouth two (2) times daily (with meals). 7/31/19   Ann Marie Santos MD  
milrinone (PRIMACOR) 20 mg/100 mL (200 mcg/mL) infusion 18.14 mcg/min by IntraVENous route continuous. 6/26/19   Dylan Douglas MD  
aspirin delayed-release 81 mg tablet Take 81 mg by mouth daily. Provider, Historical  
spironolactone (ALDACTONE) 25 mg tablet Take 25 mg by mouth daily. Provider, Historical  
  
 
PMHx:  has a past medical history of Degenerative disc disease, lumbar, Heart failure ([address] High cholesterol, Hypertension, Paroxysmal atrial fibrillation ([facility] (4/2/2019), and Spinal stenosis.   
PSurgHx:  has a past surgical history that includes hx coronary artery bypass graft; hx appendectomy; hx hernia repair; hx implantable cardioverter defibrillator; pr cardioversion elective arrhythmia external (N/A, 6/10/2019); pr cardioversion elective arrhythmia external (N/A, 6/18/2019); pr insj/rplcmt perm dfb w/trnsvns lds 1/dual chmbr (N/A, 6/21/2019); pr tcat impl wrls p-art prs snr l-t hemodyn mntr (N/A, 9/18/2019); and colonoscopy (Left, 11/11/2019). PSocHx:  reports that he quit smoking about 9 years ago. He has never used smokeless tobacco. He reports previous alcohol use. He reports that he does not use drugs. ROS:  (Complete - 10 systems) - DENIES: Weightloss (Constitutional), Dry mouth (ENMT), Chest pain (CV), SOB (Respiratory), Constipation (GI), Weakness (MS), Pallor (Skin), TIA Sx (Neuro), Confusion (Psych), Easy bruising (Heme) Physical Exam: (Comprehesive - 8+ 1995 Systems)  
 
(1) Constitutional:  FIO2: FIO2 (%): 21 % on SpO2: O2 Sat (%): 100 % O2 Device: Nasal cannula O2 Flow Rate (L/min): 4 l/min Patient Vitals for the past 24 hrs: 
 Temp Pulse Resp SpO2 Weight 12/13/19 1153 98.6 °F (37 °C) 81 22 100 %   
12/13/19 1100 99 °F (37.2 °C) 79 23 97 %   
12/13/19 1045 99 °F (37.2 °C) 83 19 97 %   
12/13/19 1030 99 °F (37.2 °C) 80 24 99 %   
12/13/19 1015 98.8 °F (37.1 °C) 73 22 99 %   
12/13/19 1000 98.8 °F (37.1 °C) 74 15 97 %   
12/13/19 0952 98.2 °F (36.8 °C) 73 18    
12/13/19 0811    93 %   
12/13/19 0800 98.7 °F (37.1 °C) 79 22 99 %   
12/13/19 0700  88 22 94 %   
12/13/19 0600  81 21 95 % 90.5 kg (199 lb 8.3 oz) 12/13/19 0500  81 22 94 %   
12/13/19 0400 98.2 °F (36.8 °C) 79 21 94 %   
12/13/19 0300  83 21 95 %   
12/13/19 0200  73 22 94 %   
12/13/19 0100  95 24 95 %   
12/13/19 0000 98 °F (36.7 °C) 86 22 94 %   
12/12/19 2352    93 %   
12/12/19 2300  93 23 94 %   
12/12/19 2200  81 23 94 %   
12/12/19 2100  75 18 95 %   
12/12/19 2000 98 °F (36.7 °C) 82  94 %   
12/12/19 1745 98.5 °F (36.9 °C) 81 20 93 %   
12/12/19 1645 98.7 °F (37.1 °C) 72 25 97 %   
12/12/19 1612 99 °F (37.2 °C) 77 20 96 %   
12/12/19 1512 98.5 °F (36.9 °C) 79 21 95 %   
 12/12/19 1427 98.9 °F (37.2 °C) 74 20 92 %  Date 12/12/19 0700 - 12/13/19 5614 12/13/19 0700 - 12/14/19 2129 Shift 6157-2704 7927-9040 24 Hour Total 4152-8963 2414-1693 24 Hour Total  
INTAKE  
P.O. 1040  1040 640  640  
  P. O. 1040  1040 640  640  
I. V.(mL/kg/hr) 115(0.1) 541.6(0.5) 656.6(0.3) 78.6  78.6 Milrinone Volume  53.3 53.3 13.6  13.6 Volume (0.9% sodium chloride infusion) 15 88.3 103.3 15  15 Volume (piperacillin-tazobactam (ZOSYN) 3.375 g in 0.9% sodium chloride (MBP/ADV) 100 mL) 100 300 400 Volume (magnesium sulfate 1 g/100 ml IVPB (premix or compounded))  100 100 Volume (potassium chloride 20 mEq in 50 ml IVPB)    50  50 Blood 278  278 Volume (TRANSFUSE PACKED RBC'S) 278  278 NG/ 1080 1190 90  90 Water Flush Volume (mL) (Nasogastric Tube 12/06/19) 40 90 130 Medication Volume (Nasogastric Tube 12/06/19) 70  70 Intake (ml) (Nasogastric Tube 12/06/19)  990 990 90  90 Shift Total(mL/kg) 1543(16.6) 1621.6(17.9) 3164.6(35) 808.6(8.9)  808.6(8.9) OUTPUT Urine(mL/kg/hr) 1042(5.0) 950(0.9) 3465(1.6) 900  900 Urine Voided 540 250 790 Urine Output (mL) ([REMOVED] Urinary Catheter 11/25/19 Lizama) 7309  9146 Urine Output (mL) (Condom Catheter 12/12/19)  700 700 900  900 Stool Stool Occurrence(s)  1 x 1 x Chest Tube 10 10 20 0  0 Output (ml) (Chest Tube #1 12/10/19 Right;Posterior) 10 10 20 0  0 Shift Total(mL/kg) 6342(71.2) 960(10.6) 4867(30.5) 900(9.9)  900(9.9) NET -982 661.6 -320.4 -91.4  -91.4 Weight (kg) 93.1 90.5 90.5 90.5 90.5 90.5  
  
(2) ENMT:   moist mucous membranes, normal sinuses, epistaxis (3) Respiratory:  breathing easily, no distress (4) GI:  no abdominal masses, tenderness  
(5) :   Normal, gross hematuria  
(6) Lymphatic:  no adenopathy, neck supple  
(7) Muscloskeletal:  no gross deformity, normal ROM  
(8) Skin:  no rash, warm & dry (9) Neuro:  no focal deficits, normal speech Signed By: Chente Jones NP  - December 13, 2019

## 2019-12-13 NOTE — PROGRESS NOTES
ID Progress Note 2019 Subjective: Out of the ICU, now with hematuria and a nosebleed Objective: Antibiotics: 1. Levaquin 2. Rifampin 3. Fluconazole 4. Vancomycin 5. Cefazolin 6. Zosyn Vitals:  
Visit Vitals BP 91/72 (BP 1 Location: Left arm, BP Patient Position: At rest) Pulse 87 Temp 98.5 °F (36.9 °C) Resp 22 Ht 6' 2\" (1.88 m) Wt 90.5 kg (199 lb 8.3 oz) SpO2 97% BMI 25.62 kg/m² Tmax:  Temp (24hrs), Av.6 °F (37 °C), Min:98 °F (36.7 °C), Max:99 °F (37.2 °C) Exam:  Lungs:  clear to auscultation bilaterally Heart:  regular rate and rhythm Abdomen:  soft, non-tender. Bowel sounds normal. No masses,  no organomegaly Labs:     
Recent Labs 19 
0300 19 
0403 19 
0435 WBC 4.7 4.5 4.2 HGB 7.5* 7.8* 7.8*  
 149* 131* BUN 39* 38* 38* CREA 2.44* 2.56* 2.72* SGOT 18 16 15  107 94 TBILI 0.5 0.5 0.4 Cultures: No results found for: SDES Lab Results Component Value Date/Time Culture result: NO GROWTH 5 DAYS 2019 11:23 PM  
 Culture result: HEAVY ENTEROCOCCUS SPECIES NOTED (A) 2019 11:10 AM  
 Culture result: LIGHT YEAST (A) 2019 11:10 AM  
 
 
Radiology:  
 
Line/Insert Date:        
 
Assessment:  
 
1. UTI--Serratia (2 biotypes) from culture and small amount of Enterococcus 2. CHF/cardiomyopathy 3. ?aspiration event(s) 4. Renal insufficiency Objective: 1. Continue current therapy David Mata MD

## 2019-12-13 NOTE — PROGRESS NOTES
0800 - Bedside report received from Kalyan Angulo RN 
0930 - Hematuria reported to Jamee Escobar NP. Will reconsult Urology. 0950 - 1 PRBC started. 1145 - TRANSFER - OUT REPORT: 
Verbal report given to [facility] on Vicente Gordillo  being transferred to [facility] for routine progression of care Report consisted of patients Situation, Background, Assessment and  
Recommendations(SBAR). Information from the following report(s) SBAR, Kardex, Intake/Output, MAR, Recent Results and Cardiac Rhythm paced was reviewed with the receiving nurse. Lines: PICC Triple Lumen 63/59/74 Right;Basilic (Active) Central Line Being Utilized Yes 12/13/2019  8:00 AM  
Criteria for Appropriate Use Hemodynamically unstable, requiring monitoring lines, vasopressors, or volume resuscitation 12/13/2019  8:00 AM  
Phlebitis Assessment 0 12/13/2019  8:00 AM  
Infiltration Assessment 0 12/13/2019  8:00 AM  
Arm Circumference (cm) 27 cm 11/22/2019  3:05 PM  
Date of Last Dressing Change 12/12/19 12/13/2019  8:00 AM  
Dressing Status Clean, dry, & intact 12/13/2019  8:00 AM  
External Catheter Length (cm) 2 centimeters 12/12/2019  1:02 PM  
Dressing Type Disk with Chlorhexadine gluconate (CHG); Transparent 12/13/2019  8:00 AM  
Action Taken Open ports on tubing capped 12/13/2019  8:00 AM  
Hub Color/Line Status Berdie Sequin; Infusing 12/13/2019  8:00 AM  
Positive Blood Return (Site #1) Yes 12/12/2019  8:00 PM  
Hub Color/Line Status White;Flushed;Capped 12/13/2019  8:00 AM  
Positive Blood Return (Site #2) Yes 12/12/2019  8:00 PM  
Hub Color/Line Status Red; Infusing 12/13/2019  8:00 AM  
Positive Blood Return (Site #3) Yes 12/13/2019  8:00 AM  
Alcohol Cap Used Yes 12/13/2019  8:00 AM  
   
Peripheral IV 11/16/19 Anterior;Distal;Right Forearm (Active) Site Assessment Clean, dry, & intact 12/13/2019  8:00 AM  
Phlebitis Assessment 0 12/13/2019  8:00 AM  
Infiltration Assessment 0 12/13/2019  8:00 AM  
Dressing Status Clean, dry, & intact 12/13/2019  8:00 AM  
 Dressing Type Transparent;Tape 12/13/2019  8:00 AM  
Hub Color/Line Status Blue;Capped 12/13/2019  8:00 AM  
Action Taken Open ports on tubing capped 12/13/2019  8:00 AM  
Alcohol Cap Used Yes 12/13/2019  8:00 AM  
   
Peripheral IV 11/26/19 Right Antecubital (Active) Site Assessment Clean, dry, & intact 12/13/2019  8:00 AM  
Phlebitis Assessment 0 12/13/2019  8:00 AM  
Infiltration Assessment 0 12/13/2019  8:00 AM  
Dressing Status Clean, dry, & intact 12/13/2019  8:00 AM  
Dressing Type Transparent;Tape 12/13/2019  8:00 AM  
Hub Color/Line Status Blue;Capped 12/13/2019  8:00 AM  
Action Taken Open ports on tubing capped 12/13/2019  8:00 AM  
Alcohol Cap Used Yes 12/13/2019  8:00 AM  
  
 
Opportunity for questions and clarification was provided. Patient transported with: 
 Monitor O2 @ 4 liters Registered Nurse Tech

## 2019-12-13 NOTE — PROGRESS NOTES
Physical Therapy 12/13/2019 Chart reviewed and noted patient receiving blood at this time through PICC line and RN requesting to hold. Will follow up for PT interventions as able. Thank you.

## 2019-12-14 NOTE — PROGRESS NOTES
0730:  Bedside shift change report given to [address], RN (oncoming nurse) by Kristine Lee RN (offgoing nurse). Report included the following information SBAR, Kardex, Procedure Summary, Intake/Output, MAR, and Recent Results. 0800:  Patient appears agitated, restless, and states \"I can't catch my breath\". RN performed assessment, repositioned patient, noted blood clots surrounding feeding tube and occluding nostril. Nostril cleansed with saline and clots removed. Patient states \"I feel a little better\". 1130:  Patient appears drowsy, attempting to rest. CPAP placed for comfort while resting. 1145:  Patient removed CPAP. 1350:  Driveline dressing changed performed under sterile technique. No drainage or breakdown noted. 1600:  CHG bath complete. 1738:  RN assisted patient in eating pudding and applesauce, drank 120 mL water and 120 mL ensure for dinner. Attempted to feed patient dinner but declined stating \"it all tastes so bad\". 1840:  Nocturnal tube feeding started. 1930:  Bedside shift change report given to Alyssa Morrell RN (oncoming nurse) by [address], RN (offgoing nurse). Report included the following information SBAR, Kardex, Procedure Summary, Intake/Output, MAR and Recent Results. Problem: Falls - Risk of 
Goal: *Absence of Falls Description Document Luis Enrique Forresters Fall Risk and appropriate interventions in the flowsheet. Outcome: Progressing Towards Goal 
Note: Fall Risk Interventions: 
Mobility Interventions: Communicate number of staff needed for ambulation/transfer, Bed/chair exit alarm, PT Consult for mobility concerns, Patient to call before getting OOB, Utilize gait belt for transfers/ambulation, Utilize walker, cane, or other assistive device Mentation Interventions: Bed/chair exit alarm, Door open when patient unattended Medication Interventions: Bed/chair exit alarm, Patient to call before getting OOB, Teach patient to arise slowly, Utilize gait belt for transfers/ambulation Elimination Interventions: Call light in reach, Patient to call for help with toileting needs, Toileting schedule/hourly rounds History of Falls Interventions: Bed/chair exit alarm, Consult care management for discharge planning, Door open when patient unattended, Evaluate medications/consider consulting pharmacy, Room close to nurse's station Problem: Pain Goal: *Control of Pain Outcome: Progressing Towards Goal 
  
Problem: Pressure Injury - Risk of 
Goal: *Prevention of pressure injury Description Document Mich Scale and appropriate interventions in the flowsheet. Outcome: Progressing Towards Goal 
Note: Pressure Injury Interventions: 
Sensory Interventions: Assess changes in LOC, Assess need for specialty bed, Check visual cues for pain, Discuss PT/OT consult with provider, Keep linens dry and wrinkle-free, Maintain/enhance activity level, Pressure redistribution bed/mattress (bed type), Turn and reposition approx. every two hours (pillows and wedges if needed) Moisture Interventions: Check for incontinence Q2 hours and as needed, Maintain skin hydration (lotion/cream) Activity Interventions: Increase time out of bed, Pressure redistribution bed/mattress(bed type), PT/OT evaluation Mobility Interventions: HOB 30 degrees or less, Pressure redistribution bed/mattress (bed type), Turn and reposition approx. every two hours(pillow and wedges) Nutrition Interventions: Document food/fluid/supplement intake, Discuss nutritional consult with provider, Offer support with meals,snacks and hydration Friction and Shear Interventions: Apply protective barrier, creams and emollients, Lift sheet, Foam dressings/transparent film/skin sealants Problem: LVAD Post-op Day 15 to Discharge Goal: Nutrition/Diet Outcome: Progressing Towards Goal 
  
Problem: Impaired Skin Integrity/Pressure Injury Treatment Goal: *Improvement of Existing Pressure Injury Outcome: Progressing Towards Goal

## 2019-12-14 NOTE — PROGRESS NOTES
Labs reviewed and stable Cont present care Will f/u on Monday Call with any questions Alex Hodges MD 
[facility]  
[address] [address] [facility] [address], [address] [facility] Phone - [phone] Fax - [phone] 
[email or web address]

## 2019-12-14 NOTE — PROGRESS NOTES
[address] [facility] [address] in [place], [place] Inpatient Progress Note Patient name: Anya Bingham Patient : 1950 Patient MRN: [id number] Attending MD: Mitch Ham MD 
Date of service: 19 Chief Complaint:  
Lissy Duron azucena LVAD implant 
  
HPI: Sona Kiser is a 71y.o. year old pleasant white male with a history of HTN, HLD, JOSE, CAD s/p cardiac arrest VFib s/p CABG () c/b sternal would infection and sternectomy, ischemic cardiomyopathy LVEF 15-20%, s/p ICD and with LBBB. He was admitted with acute on chronic systolic heart failure with massive volume overload > 20 lbs, in the setting of atrial fibrillation s/p failed DCCV and underwent DCCV and RHC on .  S/p BiVICD on 19 with Dr. Gio Lantigua. He was discharged home home on IV milrinone on 19. [facility] has been followed closely by Dr. Ethel Sinclair and the [facility]. 
  
Mr. Kiser was admitted for acute on chronic systolic heart failure. He underwent implantation of Impella 5.0 due to CS and has completed his LVAD evaluation. [facility] meets criteria for implant of HM3 as DT. He was NYHA Class IV prior to implant of Impella 5.0, has LVEF < 15%, was intolerant of GDMT due to symptomatic hypotension and renal dysfunction. He remains dependent on temporary MCS support. RHC  revealed compensated hemodynamics on Impella. His renal function has improved dramatically with improvement in his hemodynamics. He is not a suitable transplant candidate due to single kidney, sternectomy, debility, and frailty. He was reviewed by Bobby Rodriguez and felt to be a high risk heart transplant candidate due to multiple co-morbidities as well as the afore mentioned conditions.  He remained in the CCU and underwent a HM 3 implant as DT on . He was weaned off of pressors and transferred to [place] [address] where he continues to undergo PT/OT and hemodynamic optimization.   
  
24Hr Events Right pigtail cath with minimal drainage Complains of dyspnea - restless night due to dyspnea Blood clots in nares - removed by RN Procedure:  Procedure(s): REMOVAL TEMPORARY LEFT VENTRICULAR ASSIST DEVICE, IMPLANTATION OF LEFT VENTRICULAR ASSIST DEVICE PERMANENT (VAD), ECC, JACQUE, EPI AORTIC US BY DR Estrella Felton.    
POD:  25 
  
Impression / Plan:  
Heart Failure Status: NYHA Class IV Chronic systolic heart failure due to ICM, NYHA Class IV, EF < 15%, cardiogenic shock bridged with Impella 5.0 support to HM 3 implant S/p Impella removal and LVAD implant 11/18/19 Increase RPMs to 6000 rpm  
Consider CTA of outflow cannula when renal function has recovered due to inability to offload LV on high speed TTE with bubble study negative for PFO Continue milrinone to 0.2 mcg/kg/min Obtain daily CardioMEMs readings when in stepdown unit Start Bumex 2 mg IV BID Increase Sildenafil to 40 mg PO TID - rx sent to local pharmacy to initiate PA Intolerant of BB due to RV failure Intolerant of ACEi/ARB/ARNI/AA due to JUAN J on CKD 3 Strict I/O, daily weights, Na+ restricted diet Continue LVAD education Daily dressing changes until POD 30 Generalized myoclonus Improved Appreciate Neurology input Continue Keppra with renal dosing - consider decreased dosing due to somnolence Head CT negative for acute process EEG suggestive of mild generalized encephalopathic process, possibly related to underlying structural brain injury vs metabolic abnormality Monitor closely  
  
Anticoagulation for LVAD, INR goal 2-3 Continue ASA INR 1.6 - decreased rapidly with initiation of milrinone 5 mg warfarin tonight  
  
Acute Respiratory Failure post op Improved with aggressive diuresis, but still c/o dyspnea CXR and O2 requirement improved ABG acceptable TTE with Bubble study negative for PFO Pulmonary Consult appreciated Pulmonary hygiene Cont home CPAP- must use while sleeping Thoracentesis with pigtail cath - minimal drainage Acute on CKD 3, atrophic left kidney Appreciate Nephrology assistance Watch Cr - improving Daily diuretic dosing Avoid nephrotoxins, trend labs Renally dose meds  
  
CAD s/p CABG Cont low dose ASA- watch platelets No BB d/t RV failure Hold statin due to recent hepatic failure LHC performed 11/13 - low likelihood of benefit from revascularization  
  
Hx of VF arrest s/p BiV-ICD No recent shocks Keep K > 4 and Mg > 2  
   
PAF Dig level 0.8 - resume low dose dig today (62.5 mcg qMWF) No BB due to RV failure Repeat TFTs WNL 
  
Hx of gout Uric acid WNL Acute blood loss anemia Likely due to hematuria Begin octreotide 25 mcg SQ TID Transfuse today - Hgb 7.5 Check FOB , UA 
  
Suspected aspiration pneumonia Sputum culture showing scant growth of yeast 
Begin cefepime for UTI - should cross cover PNA Urinary retention, c/b serratia UTI and hematuria Appreciate Urology consult - asked to see again due to new hematuria, suspected recurrence of UTI Repeat UA with cx Begin cefepime Cystoscopy performed 11/13 shows catheter induced cystitis Malnutrition Appreciate Nutritionist consult Prealbumin weekly - 10.1 on 12/9 Continue pureed diet with nectar thick liquids Cont TF via dobhoff - at goal  
Intolerant of mirtazapine d/t tremors, confusion  
  
COPD Appreciate Pulmonology assistance Continue nebs PRN   
  
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  
  
Hx of sternal wound infection, sternectomy Sternum closed post op- monitor  
  
Vocal cord paralysis Improved ENT recs appreciated Neck CT- R neck edema, no airway compromise Speech therapy following - appreciate input 
  
Debility Continue PT/OT  
  
Ppx Protonix PT/OT Bowel regimen Continue pureed diet with nectar thick liquids PICC placed 11/22/19  
  
Dispo: Will need IP rehab. Thank you for letting us see him with you, Mounika Jorgensen MD, Nubia Ramirez Chief of Cardiology, [facility] Medical Director Calos Francis Rueda [address] [place] [address], [address] [facility], [address] [place] [address] Office [phone] Fax [phone] LVAD INTERROGATION: 
Device interrogated in person Device function normal, normal flow, no events LVAD Pump Speed (RPM): 5800 Pump Flow (LPM): 5.1 MAP: 90 
PI (Pulsitility Index): 3.6 Power: 4.5  Test: Yes 
Back Up  at Bedside & Labeled: Yes Power Module Test: Yes Driveline Site Care: No 
Driveline Dressing: Clean, Dry, and Intact Outpatient: No 
MAP in Therapeutic Range (Outpatient): Yes Testing Alarms Reviewed: Yes 
Back up SC speed: 5800 Back up Low Speed Limit: 5400 Emergency Equipment with Patient?: Yes Emergency procedures reviewed?: Yes Drive line site inspected?: Yes Drive line intergrity inspected?: Yes Drive line dressing changed?: No 
 
PHYSICAL EXAM: 
Visit Vitals BP 91/72 (BP 1 Location: Left arm, BP Patient Position: At rest) Pulse 77 Temp 98.2 °F (36.8 °C) Resp 18 Ht 6' 2\" (1.88 m) Wt 187 lb 2.7 oz (84.9 kg) SpO2 98% BMI 24.03 kg/m² Physical Exam  
Constitutional: He appears well-developed. He appears lethargic. He appears cachectic. No distress. More fatigued today HENT:  
Head: Normocephalic. Neck: Normal range of motion. Neck supple. No JVD present. Cardiovascular: Regular rhythm. + VAD hum Pulmonary/Chest: Effort normal and breath sounds normal. No respiratory distress. Abdominal: Soft. Bowel sounds are normal. He exhibits no distension. Dobhoff in place Genitourinary:    Genitourinary Comments: Hematuria Musculoskeletal: Normal range of motion. General: No edema. Neurological: He appears lethargic. Skin: Skin is warm and dry. Nursing note and vitals reviewed.  
 
 
REVIEW OF SYSTEMS: 
 Review of Systems Constitutional: Positive for malaise/fatigue. Negative for chills and fever. HENT: Positive for hearing loss. Respiratory: Positive for shortness of breath. Negative for cough. Mild dyspnea Cardiovascular: Negative for chest pain, palpitations, orthopnea and leg swelling. Gastrointestinal: Negative for heartburn, nausea and vomiting. Genitourinary: Negative. Musculoskeletal: Negative. Neurological: Positive for weakness. Negative for dizziness and headaches. Endo/Heme/Allergies: Negative. PAST MEDICAL HISTORY: 
Past Medical History:  
Diagnosis Date  Degenerative disc disease, lumbar  Heart failure ([facility] [address]  High cholesterol  Hypertension  Paroxysmal atrial fibrillation ([facility] [address] 4/2/2019  Spinal stenosis PAST SURGICAL HISTORY: 
Past Surgical History:  
Procedure Laterality Date  COLONOSCOPY Left 11/11/2019 COLONOSCOPY at bedside performed by Randall Hawthorne MD at [address]  HX CORONARY ARTERY BYPASS GRAFT    
 triple  HX HERNIA REPAIR    
 HX IMPLANTABLE CARDIOVERTER DEFIBRILLATOR  [place] CARDIOVERSION ELECTIVE ARRHYTHMIA EXTERNAL N/A 6/10/2019 EP CARDIOVERSION performed by Umang Tierney MD at [place] [address], [facility] [address] CATH LAB  [place] CARDIOVERSION ELECTIVE ARRHYTHMIA EXTERNAL N/A 6/18/2019 EP CARDIOVERSION performed by Martha Goss MD at [place] [address], [facility] CATH LAB  [place] INSJ/RPLCMT PERM DFB W/TRNSVNS LDS 1/DUAL CHMBR N/A 6/21/2019 INSERT ICD BIV MULTI performed by Ron Peralta MD at [place] [address], [facility] CATH LAB  [place] TCAT IMPL WRLS P-ART PRS SNR L-T HEMODYN MNTR N/A 9/18/2019 IMPLANT HEART FAILURE MONITORING DEVICE performed by Shabbir Shah MD at [place] [address], [facility] CATH LAB FAMILY HISTORY: 
Family History Problem Relation Age of Onset  Lung Disease Mother  Hypertension Mother [facility] Arthritis-osteo Mother  Heart Disease Mother  Heart Disease Father  Diabetes Father SOCIAL HISTORY: 
Social History Socioeconomic History  Marital status:  Spouse name: Not on file  Number of children: Not on file  Years of education: Not on file  Highest education level: Not on file Tobacco Use  Smoking status: Former Smoker Last attempt to quit: 2010 Years since quittin.0  Smokeless tobacco: Never Used Substance and Sexual Activity  Alcohol use: Not Currently Comment: rarely  Drug use: Never Other Topics Concern LABORATORY RESULTS: 
  
Labs Latest Ref Rng & Units 2019 2019 2019 2019 12/10/2019 2019 2019 WBC 4.1 - 11.1 K/uL 4.7 4.7 4.5 4.2 4.8 5.2 5.5  
RBC 4.10 - 5.70 M/uL 2.70(L) 2.56(L) 2.63(L) 2.64(L) 2.63(L) 2.67(L) 2.68(L) Hemoglobin 12.1 - 17.0 g/dL 7. 8(L) 7. 5(L) 7. 8(L) 7. 8(L) 7. 8(L) 7. 9(L) 8.0(L) Hematocrit 36.6 - 50.3 % 26. 1(L) 25. 0(L) 25. 9(L) 26. 3(L) 26. 7(L) 26. 9(L) 26. 9(L) MCV 80.0 - 99.0 FL 96.7 97.7 98.5 99. 6(H) 101. 5(H) 100. 7(H) 100. 4(H) Platelets 564 - 719 K/uL 162 157 149(L) 131(L) 138(L) 134(L) 146(L) Lymphocytes 12 - 49 % - - - - - - - Monocytes 5 - 13 % - - - - - - - Eosinophils 0 - 7 % - - - - - - - Basophils 0 - 1 % - - - - - - - Albumin 3.5 - 5.0 g/dL 2. 5(L) 2. 5(L) 2. 5(L) 2. 4(L) 2. 6(L) 2. 6(L) 2. 9(L) Calcium 8.5 - 10.1 MG/DL 9.0 9.0 9.0 8.5 8.8 9.0 8.6 SGOT 15 - 37 U/L 14(L) 18 16 15 18 15 14(L) Glucose 65 - 100 mg/dL 163(H) 123(H) 105(H) 97 96 120(H) 142(H) BUN 6 - 20 MG/DL 38(H) 39(H) 38(H) 38(H) 40(H) 39(H) 34(H) Creatinine 0.70 - 1.30 MG/DL 2.31(H) 2.44(H) 2.56(H) 2.72(H) 3.08(H) 3.43(H) 3.46(H) Sodium 136 - 145 mmol/L 140 140 140 142 143 143 147(H) Potassium 3.5 - 5.1 mmol/L 3.8 3.9 4.0 4.1 4.2 4.2 3.9 TSH 0.36 - 3.74 uIU/mL - - - - - - -  
PSA 0.01 - 4.0 ng/mL - - - - - - -  
LDH 85 - 241 U/L 242(H) 267(H) 249(H) 240 257(H) 257(H) 247(H) CEA ng/mL - - - - - - - Some recent data might be hidden Lab Results Component Value Date/Time TSH 2.30 12/03/2019 03:29 AM  
 TSH 2.40 10/25/2019 07:39 PM  
 TSH 2.45 06/01/2019 04:16 AM  
 
 
ALLERGY: 
No Known Allergies CURRENT MEDICATIONS: 
Current Facility-Administered Medications Medication Dose Route Frequency  magnesium oxide (MAG-OX) tablet 400 mg  400 mg Oral BID  diphenhydrAMINE (BENADRYL) capsule 25 mg  25 mg Oral QHS PRN  
 octreotide (SANDOSTATIN) injection 25 mcg  25 mcg SubCUTAneous TID  benzocaine-menthol (CEPACOL) lozenge 1 Lozenge  1 Lozenge Mucous Membrane PRN  
 cefepime (MAXIPIME) 2 g in 0.9% sodium chloride (MBP/ADV) 100 mL  2 g IntraVENous Q24H  
 digoxin (LANOXIN) tablet 0.0625 mg  62.5 mcg Oral Q MON, WED & FRI  milrinone (PRIMACOR) 20 MG/100 ML D5W infusion  0.2 mcg/kg/min IntraVENous CONTINUOUS  
 levETIRAcetam (KEPPRA) oral solution 250 mg  250 mg Oral Q12H  pantoprazole (PROTONIX) tablet 40 mg  40 mg Oral ACB  aspirin chewable tablet 81 mg  81 mg Oral DAILY  0.9% sodium chloride infusion  3 mL/hr IntraVENous CONTINUOUS  
 albuterol-ipratropium (DUO-NEB) 2.5 MG-0.5 MG/3 ML  3 mL Nebulization Q4H RT  
 allopurinol (ZYLOPRIM) tablet 100 mg  100 mg Oral DAILY  sildenafil (REVATIO) 2 mg/mL oral suspension 20 mg  20 mg Oral Q8H  
 arformoterol (BROVANA) neb solution 15 mcg  15 mcg Nebulization BID RT And  
 budesonide (PULMICORT) 500 mcg/2 ml nebulizer suspension  500 mcg Nebulization BID RT  
 artificial saliva (MOUTH KOTE) 1 Spray  1 Spray Oral PRN  
 lactobac ac& pc-s.therm-b.anim (TIAN Q/RISAQUAD)  1 Cap Oral DAILY  oxyCODONE IR (ROXICODONE) tablet 5 mg  5 mg Oral Q6H PRN  
 balsam peru-castor oil (VENELEX) ointment   Topical TID  tamsulosin (FLOMAX) capsule 0.4 mg  0.4 mg Oral DAILY  insulin lispro (HUMALOG) injection   SubCUTAneous AC&HS  Warfarin MD/NP dosing   Other PRN  
 epoetin yvonne-epbx (RETACRIT) injection 20,000 Units  20,000 Units SubCUTAneous Q7D  
  sodium chloride (NS) flush 5-40 mL  5-40 mL IntraVENous Q8H  
 sodium chloride (NS) flush 5-40 mL  5-40 mL IntraVENous PRN  
 acetaminophen (TYLENOL) tablet 650 mg  650 mg Oral Q6H PRN  
 naloxone (NARCAN) injection 0.4 mg  0.4 mg IntraVENous PRN  
 ondansetron (ZOFRAN) injection 4 mg  4 mg IntraVENous Q4H PRN  
 senna-docusate (PERICOLACE) 8.6-50 mg per tablet 1 Tab  1 Tab Oral DAILY  bisacodyl (DULCOLAX) tablet 5 mg  5 mg Oral DAILY PRN  
 sucralfate (CARAFATE) tablet 1 g  1 g Oral AC&HS  influenza vaccine 2019-20 (6 mos+)(PF) (FLUARIX/FLULAVAL/FLUZONE QUAD) injection 0.5 mL  0.5 mL IntraMUSCular PRIOR TO DISCHARGE  hydrALAZINE (APRESOLINE) 20 mg/mL injection 20 mg  20 mg IntraVENous Q6H PRN  
 dextrose 10 % infusion 125-250 mL  125-250 mL IntraVENous PRN Thank you for allowing me to participate in this patient's care. MD Calos Cervantes [place] [address] [address], [address] Phone: [phone] Fax: [phone]

## 2019-12-14 NOTE — PROGRESS NOTES
NYHA class IV A/C systolic heart failure Low EF (10%) Inotrope dependent Malnutrition  
LVAD Work-up Epistaxis Hematuria RV dysfunction Fluid overload Still has a little confusion although pleasant Hgb and platelets look good INR a litte low Creatinine in the mid 2's Bilirubin and other LFTs look good LDH and lactic acid reasonable Pro-calcitonin normal 
 
NT pro-BNP coming down Continues on milrinone CXR - mild pulmonary edema Intake/Output Summary (Last 24 hours) at 12/14/2019 1054 Last data filed at 12/14/2019 9191 Gross per 24 hour Intake 779.75 ml Output 1780 ml Net -1000.25 ml Visit Vitals BP 91/72 (BP 1 Location: Left arm, BP Patient Position: At rest) Pulse 88 Temp 98.4 °F (36.9 °C) Resp 20 Ht 6' 2\" (1.88 m) Wt 187 lb 2.7 oz (84.9 kg) SpO2 98% BMI 24.03 kg/m² Risk of morbidity and mortality - high Medical decision making - high complexity Total critical care time - 30 minutes (CPT 99567)

## 2019-12-15 NOTE — PROGRESS NOTES
0730:  Bedside shift change report given to Emily Story RN (oncoming nurse) by Mahogany Noel RN (offgoing nurse). Report included the following information SBAR, Kardex, Procedure Summary, Intake/Output, and Recent Results. 0800:  Patient with continued bleeding around NGT, mouth full of blood, with labored breathing. RN paged Dr. Tami Saab and received orders to remove NGT and order Afrin. NGT removed, afrin applied to nose, mouth care performed, RN removed large size clot from mouth. 0900:  Patient assisted back to bed, nose continues to bleed despite pressure being held and nose being packed with afrin gauze. 1130:  Patient's wife at bedside, patient appears more calm. Wife assisting patient with lunch. Ate 50%. 1250:  Patient coughed up large blood clot. 1342:  PRN klonopin given for anxiety. 1356:  RN spoke with Dr. Joshua Couch regarding consult. 1505:  Patient c/o nausea, PRN zofran given. 1520:  Patient continues to have PI events, PI noted to be lower than start of shift. RN paged Dr. Tami Saab and received orders for small bottle of albumin, stat H and H to transfuse if hemoglobin <7.5.  
 
1545:  Albumin given, labs drawn and sent to lab. Patient vomited 200 
mL of emesis and blood clots of various sizes. 1550:  Hemoglobin 7.8. 
 
1600:  Dr. Joshua Couch at bedside to to assess patient. 1700:  Patient resting comfortably. 1825:  Gauze removed from patient's R nare per Dr. Lewis Fitting orders. No drainage or bleeding noted. 1930:  Bedside shift change report given to Se Adam RN (oncoming nurse) by Emily Story RN (offgoing nurse). Report included the following information SBAR, Kardex, Procedure Summary, Intake/Output, MAR and Recent Results. Problem: Falls - Risk of 
Goal: *Absence of Falls Description Document Chirag Thrasher Fall Risk and appropriate interventions in the flowsheet. Outcome: Progressing Towards Goal 
Note: Fall Risk Interventions: Mobility Interventions: Communicate number of staff needed for ambulation/transfer, OT consult for ADLs, Patient to call before getting OOB, PT Consult for mobility concerns, PT Consult for assist device competence, Bed/chair exit alarm Mentation Interventions: Bed/chair exit alarm, Door open when patient unattended Medication Interventions: Bed/chair exit alarm, Evaluate medications/consider consulting pharmacy, Patient to call before getting OOB, Teach patient to arise slowly Elimination Interventions: Bed/chair exit alarm, Call light in reach, Patient to call for help with toileting needs, Stay With Me (per policy) History of Falls Interventions: Bed/chair exit alarm, Consult care management for discharge planning, Door open when patient unattended, Evaluate medications/consider consulting pharmacy, Room close to nurse's station Problem: Pain Goal: *Control of Pain Outcome: Progressing Towards Goal 
  
Problem: Pressure Injury - Risk of 
Goal: *Prevention of pressure injury Description Document Mich Scale and appropriate interventions in the flowsheet. Outcome: Progressing Towards Goal 
Note: Pressure Injury Interventions: 
Sensory Interventions: Assess changes in LOC, Assess need for specialty bed, Check visual cues for pain, Discuss PT/OT consult with provider, Keep linens dry and wrinkle-free, Maintain/enhance activity level, Pressure redistribution bed/mattress (bed type), Turn and reposition approx. every two hours (pillows and wedges if needed) Moisture Interventions: Internal/External urinary devices, Maintain skin hydration (lotion/cream), Apply protective barrier, creams and emollients, Absorbent underpads, Check for incontinence Q2 hours and as needed Activity Interventions: Increase time out of bed, Pressure redistribution bed/mattress(bed type), PT/OT evaluation, Chair cushion Mobility Interventions: HOB 30 degrees or less, Pressure redistribution bed/mattress (bed type), PT/OT evaluation, Turn and reposition approx. every two hours(pillow and wedges) Nutrition Interventions: Document food/fluid/supplement intake Friction and Shear Interventions: HOB 30 degrees or less, Lift sheet Problem: Heart Failure: Discharge Outcomes Goal: *Describes available resources and support systems Description 
(eg: Home Health, Palliative Care, Advanced Medical Directive) Outcome: Progressing Towards Goal 
  
Problem: LVAD Post-op Day 15 to Discharge Goal: Medications Outcome: Progressing Towards Goal

## 2019-12-15 NOTE — PROGRESS NOTES
ID Progress Note 12/15/2019 Subjective: Out of the ICU, now with hematuria and a nosebleed Objective: Antibiotics: 1. Levaquin 2. Rifampin 3. Fluconazole 4. Vancomycin 5. Cefazolin 6. Zosyn Vitals:  
Visit Vitals BP 91/72 (BP 1 Location: Left arm, BP Patient Position: At rest) Pulse 85 Temp 97.7 °F (36.5 °C) Resp 22 Ht 6' 2\" (1.88 m) Wt 83 kg (182 lb 15.7 oz) SpO2 98% BMI 23.49 kg/m² Tmax:  Temp (24hrs), Av.1 °F (36.7 °C), Min:97.7 °F (36.5 °C), Max:98.5 °F (36.9 °C) Exam:  Lungs:  clear to auscultation bilaterally Heart:  regular rate and rhythm Abdomen:  soft, non-tender. Bowel sounds normal. No masses,  no organomegaly Labs:     
Recent Labs 12/15/19 
0332 19 
0356 19 
0300 WBC 5.2 4.7 4.7 HGB 7.8* 7.8* 7.5*  162 157 BUN 40* 38* 39* CREA 2.21* 2.31* 2.44* SGOT 12* 14* 18  
AP 96 91 105 TBILI 0.5 0.6 0.5 Cultures: No results found for: SDES Lab Results Component Value Date/Time Culture result: NO GROWTH 5 DAYS 2019 11:23 PM  
 Culture result: HEAVY ENTEROCOCCUS SPECIES NOTED (A) 2019 11:10 AM  
 Culture result: LIGHT YEAST (A) 2019 11:10 AM  
 
 
Radiology:  
 
Line/Insert Date:        
 
Assessment:  
 
1. UTI--Serratia (2 biotypes) from culture and small amount of Enterococcus 2. CHF/cardiomyopathy 3. ?aspiration event(s) 4. Renal insufficiency Objective: 1. Continue current therapy Kye Paniagua MD

## 2019-12-15 NOTE — PROGRESS NOTES
1930: Bedside shift change report received by Rosenda Knight (offgoing nurse). Report included the following information SBAR, Kardex, Procedure Summary, Intake/Output, MAR, Recent Results, and Cardiac Rhythm Paced . 0248: Pt called out c/o feeling SOB. O2 sats 100%. Pt has been having issues since 2300 with his R nare bleeding small amounts intermittently. Dophoff remains in place. Lung sounds are slightly coarse. Called RT to bring treatment at 0300.  
 
0800: Bedside shift change report given to [facility] (oncoming nurse). Report included the following information SBAR, Kardex, Procedure Summary, Intake/Output, MAR, Recent Results and Cardiac Rhythm Paced.  
 
------------------ Care Plan 2218 Problem: Falls - Risk of 
Goal: *Absence of Falls Description Document Teofilo Carmen Fall Risk and appropriate interventions in the flowsheet. Outcome: Progressing Towards Goal 
Note: Fall Risk Interventions: 
Mobility Interventions: Communicate number of staff needed for ambulation/transfer, OT consult for ADLs, Patient to call before getting OOB, PT Consult for mobility concerns, PT Consult for assist device competence, Bed/chair exit alarm Mentation Interventions: Bed/chair exit alarm, Door open when patient unattended Medication Interventions: Bed/chair exit alarm, Evaluate medications/consider consulting pharmacy, Patient to call before getting OOB, Teach patient to arise slowly Elimination Interventions: Bed/chair exit alarm, Call light in reach, Patient to call for help with toileting needs, Stay With Me (per policy) History of Falls Interventions: Bed/chair exit alarm, Consult care management for discharge planning, Door open when patient unattended, Evaluate medications/consider consulting pharmacy, Room close to nurse's station Problem: Pressure Injury - Risk of 
Goal: *Prevention of pressure injury Description Document Mich Scale and appropriate interventions in the flowsheet. Outcome: Progressing Towards Goal 
Note: Pressure Injury Interventions: 
Sensory Interventions: Assess changes in LOC, Assess need for specialty bed, Check visual cues for pain, Discuss PT/OT consult with provider, Keep linens dry and wrinkle-free, Maintain/enhance activity level, Pressure redistribution bed/mattress (bed type), Turn and reposition approx. every two hours (pillows and wedges if needed) Moisture Interventions: Internal/External urinary devices, Maintain skin hydration (lotion/cream), Apply protective barrier, creams and emollients, Absorbent underpads, Check for incontinence Q2 hours and as needed Activity Interventions: Increase time out of bed, Pressure redistribution bed/mattress(bed type), PT/OT evaluation, Chair cushion Mobility Interventions: HOB 30 degrees or less, Pressure redistribution bed/mattress (bed type), PT/OT evaluation, Turn and reposition approx. every two hours(pillow and wedges) Nutrition Interventions: Document food/fluid/supplement intake Friction and Shear Interventions: HOB 30 degrees or less, Lift sheet Problem: LVAD Post-op Day 15 to Discharge Goal: Activity/Safety Outcome: Progressing Towards Goal 
Note:  
Per dayshift RN, pt did not get OOB today. Goal: Nutrition/Diet Outcome: Progressing Towards Goal 
Note:  
Poor po intake per day shift RN. Pt is on nocturnal feeds. Goal: Medications Outcome: Progressing Towards Goal 
Note:  
See MAR. Goal: Discharge Planning Outcome: Progressing Towards Goal 
Goal: Respiratory Outcome: Progressing Towards Goal 
Note:  
Remains on 4L. With pigtail drain. No S/S of distress. Lung sounds diminished. Goal: Treatments/Interventions/Procedures Outcome: Progressing Towards Goal 
Goal: Psychosocial 
Outcome: Progressing Towards Goal 
Note:  
Calm, cooperative, pleasant.

## 2019-12-15 NOTE — PROGRESS NOTES
[facility] VAD/Heart Failure Progress Note Admit Date: 10/25/2019 POD:  20 Days Post-Op Procedure:  Procedure(s): LEFT BRACHIAL THROMBECTOMY Subjective:  
Hematuria, epistaxis, dyspnea, fatigue, and weakness; up on LVAD speed; diuretics Objective:  
Vitals: 
Blood pressure 91/72, pulse 74, temperature 98 °F (36.7 °C), resp. rate 22, height 6' 2\" (1.88 m), weight 182 lb 15.7 oz (83 kg), SpO2 100 %. Temp (24hrs), Av.2 °F (36.8 °C), Min:98 °F (36.7 °C), Max:98.5 °F (36.9 °C) Hemodynamics: 
 CO: CO (l/min): 5.8 l/min CI: CI (l/min/m2): 2.8 l/min/m2 CVP: CVP (mmHg): 4 mmHg (19 1645) SVR: SVR (dyne*sec)/cm5: 1080 (dyne*sec)/cm5 (19 1200) PAP Systolic: PAP Systolic: 34 (62/91/10 4319) PAP Diastolic: PAP Diastolic: 13 (49/53/09 1850) PVR:   
 SV02: SVO2 (%): 67 % (19 1300) SCV02: SCVO2 (%): 75 % (10/29/19 1900) VAD Interrogation: LVAD (Heartmate) Pump Speed (RPM): 6000 Pump Flow (LPM): 5.5 PI (Pulsitility Index): 3.7 Power: 4.7 MAP: 84  Test: Yes 
Back Up  at Bedside & Labeled: Yes Power Module Test: Yes Driveline Site Care: No 
Driveline Dressing: Clean, Dry, and Intact EKG/Rhythm:   
 
Extubation Date / Time:  
 
CT Output:  
 
Ventilator: 
Ventilator Volumes Vt Set (ml): 550 ml (19 08) Vt Exhaled (Machine Breath) (ml): 639 ml (19 08) Vt Spont (ml): 437 ml (19 1035) Ve Observed (l/min): 7.25 l/min (19 1035) Oxygen Therapy: 
Oxygen Therapy O2 Sat (%): 100 % (12/15/19 1110) Pulse via Oximetry: 94 beats per minute (12/15/19 0324) O2 Device: Nasal cannula (12/15/19 1125) O2 Flow Rate (L/min): 2 l/min (12/15/19 1125) FIO2 (%): 21 % (19 1721) CXR: 
 
Admission Weight: Last Weight Weight: 192 lb 10.9 oz (87.4 kg) Weight: 182 lb 15.7 oz (83 kg) Intake / Colie Black / Drain: 
Current Shift: 12/15 0701 - 12/15 1900 In: 80 [P. O.:560; I.V.:44] Out: 1400 [OVQQT:7172] Last 24 hrs.:  
 
Intake/Output Summary (Last 24 hours) at 12/15/2019 1257 Last data filed at 12/15/2019 1232 Gross per 24 hour Intake 1674.96 ml Output 4850 ml Net -3175.04 ml No results for input(s): CPK, CKMB, TROIQ in the last 72 hours. Recent Labs 12/15/19 
0332 12/14/19 
0356 12/13/19 
0300  140 140  
K 3.4* 3.8 3.9 CO2 31 30 29 BUN 40* 38* 39* CREA 2.21* 2.31* 2.44* * 163* 123* MG 1.8 2.1 1.9 WBC 5.2 4.7 4.7 HGB 7.8* 7.8* 7.5* HCT 25.8* 26.1* 25.0*  
 162 157 Recent Labs 12/15/19 
0332 12/14/19 
0356 12/13/19 
0300 INR 1.8* 1.6* 1.6* PTP 17.5* 15.7* 15.4* APTT 31.9 32.2* 31.2 No lab exists for component: PBNP Current Facility-Administered Medications:  
  oxymetazoline (AFRIN) 0.05 % nasal spray 2 Spray, 2 Spray, Both Nostrils, BID, Mia Altman MD, 2 Spray at 12/15/19 9265   warfarin (COUMADIN) tablet 5 mg, 5 mg, Oral, ONCE, Mounika Altman MD 
  spironolactone (ALDACTONE) tablet 25 mg, 25 mg, Oral, DAILY, Mounika Altman MD, 25 mg at 12/15/19 1205   sildenafil (pulm.hypertension) (REVATIO) tablet 40 mg, 40 mg, Oral, TID, Mounika Altman MD, 40 mg at 12/15/19 0901   bumetanide (BUMEX) injection 2 mg, 2 mg, IntraVENous, BID, Mounika Altman MD, 2 mg at 12/15/19 0901   magnesium oxide (MAG-OX) tablet 400 mg, 400 mg, Oral, BID, Alon Herrera NP, Stopped at 12/15/19 0900   diphenhydrAMINE (BENADRYL) capsule 25 mg, 25 mg, Oral, QHS PRN, Alon Herrera NP 
  octreotide (SANDOSTATIN) injection 25 mcg, 25 mcg, SubCUTAneous, TID, Anabelle Herrera NP, 25 mcg at 12/15/19 1116 
  benzocaine-menthol (CEPACOL) lozenge 1 Lozenge, 1 Lozenge, Mucous Membrane, PRN, Alon Herrera NP 
  cefepime (MAXIPIME) 2 g in 0.9% sodium chloride (MBP/ADV) 100 mL, 2 g, IntraVENous, Q24H, Anabelle Herrera NP, Last Rate: 200 mL/hr at 12/15/19 1205, 2 g at 12/15/19 1205   digoxin (LANOXIN) tablet 0.0625 mg, 62.5 mcg, Oral, Q MON, WED & FRI, Pollliborio, [facility] Breed T, NP, 0.0625 mg at 12/13/19 1756   milrinone (PRIMACOR) 20 MG/100 ML D5W infusion, 0.2 mcg/kg/min, IntraVENous, CONTINUOUS, Sharon, Sampsonpha Breed T, NP, Last Rate: 5.6 mL/hr at 12/15/19 0119, 0.2 mcg/kg/min at 12/15/19 0119   levETIRAcetam (KEPPRA) oral solution 250 mg, 250 mg, Oral, Q12H, Sharon, [facility] Breed T, NP, 250 mg at 12/15/19 9901   pantoprazole (PROTONIX) tablet 40 mg, 40 mg, Oral, ACB, Pollliborio, [facility] Breed T, NP, 40 mg at 12/15/19 9069   aspirin chewable tablet 81 mg, 81 mg, Oral, DAILY, Tracee Andrews MD, Stopped at 12/15/19 0900 
  0.9% sodium chloride infusion, 3 mL/hr, IntraVENous, CONTINUOUS, Sampson Herrreapha Lina T, NP, Last Rate: 5 mL/hr at 12/13/19 0800, 5 mL/hr at 12/13/19 0800 
  albuterol-ipratropium (DUO-NEB) 2.5 MG-0.5 MG/3 ML, 3 mL, Nebulization, Q4H RT, Tracee Andrews MD, 3 mL at 12/15/19 1125   allopurinol (ZYLOPRIM) tablet 100 mg, 100 mg, Oral, DAILY, Layne Herrera Siemens, NP, Stopped at 12/15/19 0900 
  arformoterol (BROVANA) neb solution 15 mcg, 15 mcg, Nebulization, BID RT, 15 mcg at 12/13/19 2106 **AND** budesonide (PULMICORT) 500 mcg/2 ml nebulizer suspension, 500 mcg, Nebulization, BID RT, Layne Herrera Siemens, NP, 500 mcg at 12/15/19 1463   artificial saliva (MOUTH KOTE) 1 Spray, 1 Spray, Oral, PRN, Polliard, Marliss Siemens, NP, 1 Spray at 12/12/19 1452   lactobac ac& pc-s.therm-b.anim (TIAN Q/RISAQUAD), 1 Cap, Oral, DAILY, Miguel Francis MD, Stopped at 12/15/19 0900 
  oxyCODONE IR (ROXICODONE) tablet 5 mg, 5 mg, Oral, Q6H PRN, Carina Katz MD, 5 mg at 12/07/19 2133   balsam peru-castor oil (VENELEX) ointment, , Topical, TID, FisTracee cruz MD 
  [facility]) capsule 0.4 mg, 0.4 mg, Oral, DAILY, Logan Kincaid MD, 0.4 mg at 12/15/19 0901   insulin lispro (HUMALOG) injection, , SubCUTAneous, AC&HS, Shana Sims, NP, Stopped at 12/13/19 9800   Warfarin MD/NP dosing, , Other, PRN, Mounika Altman MD 
  epoetin yvonne-epbx (RETACRIT) injection 20,000 Units, 20,000 Units, SubCUTAneous, Q7D, Madelyn Post MD, 20,000 Units at 12/10/19 3961   sodium chloride (NS) flush 5-40 mL, 5-40 mL, IntraVENous, Q8H, Esa Giron MD, 10 mL at 12/15/19 0669   sodium chloride (NS) flush 5-40 mL, 5-40 mL, IntraVENous, PRN, Esa Giron MD 
  acetaminophen (TYLENOL) tablet 650 mg, 650 mg, Oral, Q6H PRN, Esa Giron MD, 650 mg at 12/10/19 0105 
  [facility]) injection 0.4 mg, 0.4 mg, IntraVENous, PRN, Esa Giron MD 
  ondansetron [facility] injection 4 mg, 4 mg, IntraVENous, Q4H PRN, Esa Giron MD, 4 mg at 11/20/19 2000   senna-docusate (PERICOLACE) 8.6-50 mg per tablet 1 Tab, 1 Tab, Oral, DAILY, Esa Giron MD, Stopped at 12/14/19 0900 
  bisacodyl (DULCOLAX) tablet 5 mg, 5 mg, Oral, DAILY PRN, Esa Giron MD 
  sucralfate (CARAFATE) tablet 1 g, 1 g, Oral, AC&HS, Esa Giron MD, 1 g at 12/15/19 1116 
  influenza vaccine 2019-20 (6 mos+)(PF) (FLUARIX/FLULAVAL/FLUZONE QUAD) injection 0.5 mL, 0.5 mL, IntraMUSCular, PRIOR TO DISCHARGE, Jayshree Altman MD 
  hydrALAZINE (APRESOLINE) 20 mg/mL injection 20 mg, 20 mg, IntraVENous, Q6H PRN, Shana Sims NP, 20 mg at 12/10/19 0541 
  dextrose 10 % infusion 125-250 mL, 125-250 mL, IntraVENous, PRN, Christy Aranda MD, Stopped at 11/18/19 0700 A/P 
  
S/P LVAD - good flows Need for anti-coagulation - coumadin DM - insulin RV dysfunction - milrinone, diuretics  
  
Risk of morbidity and mortality - high Medical decision making - high complexity Signed By: Kenny Diaz MD

## 2019-12-15 NOTE — CONSULTS
Otolaryngology - Head & Neck Surgery Inpatient Consult PATIENT NAME: Marta Ovalles MRN: [id number] DATE: 12/15/2019 ADMISSION DATE: 10/25/2019 Subjective:  
 
Asked by Dr. Dionicio Garnica to evaluate for epistaxis. On LVAD, INR 1.8 (comadin). He's had a steady nosebleed since yesterday evening. Hgb remains stable at 7.8. Bleeding not controlled well with oxymetazoline-moistened gauze packing. Bleeding has not been heavy but he's expectorated some large clots over the last few hours. Denies history of epistaxis, nasal surgery, or nasal trauma. Diagnosed with left vocal fold paralysis recently (Dr. Shara Joseph, Dr. Ishan Hargrove). Objective:  
 
Visit Vitals BP 91/72 (BP 1 Location: Left arm, BP Patient Position: At rest) Pulse 85 Temp 97.7 °F (36.5 °C) Resp 22 Ht 6' 2\" (1.88 m) Wt 83 kg (182 lb 15.7 oz) SpO2 98% BMI 23.49 kg/m² 12/13 1901 - 12/15 0700 In: 1839 [P.O.:1320; I.V.:299] Out: 4280 [NXIVE:2937] Physical Exam:  
 
General - in bed, NAD, awake and responsive. Head & face - no facial weakness, No visible lesions. Voice - slightly weak. Nose - large, firm, dark bloody scab occluding left nare. Right nasal packing (gauze) removed from left anterior nare. Fresh blood and clot on left, anterior. Debrided bilaterally. LARGE, CHRONIC-APPEARING SEPTAL PERFORATION PRESENT WITH ACTIVE BLEEDING ALONG ANTERIOR EDGE -- REGION ANESTHETIZED TOPICALLY AND CAUTERIZED WITH SILVER NITRATE. GOOD CONTROL. OXYMETAZOLINE-MOISTENED COTTON BALL FIRMLY PACKED OVER CAUTERIZATION SITE. Assessment:  
 
Right anterior epistaxis, chronic septal perforation. Active bleeding from focal region along anterior margin of perforation controlled with chemical cautery. No clear etiology for septal perforation. Plan:  
 
I asked the ICU nurse to remove the cotton ball pack in about 2-3 hours. Please call if persistent bleeding restarts. Paxton Almodovar MD 
 [phone] - Office [phone] - cell

## 2019-12-15 NOTE — PROGRESS NOTES
[address] [facility] [place], [place] Inpatient Progress Note Patient name: Martha Gallagher Patient : 1950 Patient MRN: [id number] Attending MD: Yue Dalal MD 
Date of service: 12/15/19 Chief Complaint:  
Rosemary Billow azucena LVAD implant 
  
HPI: Sona Kiser is a 71y.o. year old pleasant white male with a history of HTN, HLD, JOSE, CAD s/p cardiac arrest VFib s/p CABG () c/b sternal would infection and sternectomy, ischemic cardiomyopathy LVEF 15-20%, s/p ICD and with LBBB. He was admitted with acute on chronic systolic heart failure with massive volume overload > 20 lbs, in the setting of atrial fibrillation s/p failed DCCV and underwent DCCV and RHC on .  S/p BiVICD on 19 with Dr. Yariel Hanks. He was discharged home home on IV milrinone on 19. Billy Rosas has been followed closely by Dr. Kelsey Mays and the [facility]. 
  
Mr. Kiser was admitted for acute on chronic systolic heart failure. He underwent implantation of Impella 5.0 due to CS and has completed his LVAD evaluation. Billy Rosas meets criteria for implant of HM3 as DT. He was NYHA Class IV prior to implant of Impella 5.0, has LVEF < 15%, was intolerant of GDMT due to symptomatic hypotension and renal dysfunction. He remains dependent on temporary MCS support. RHC  revealed compensated hemodynamics on Impella. His renal function has improved dramatically with improvement in his hemodynamics. He is not a suitable transplant candidate due to single kidney, sternectomy, debility, and frailty. He was reviewed by [address] [place] [address] and felt to be a high risk heart transplant candidate due to multiple co-morbidities as well as the afore mentioned conditions.  He remained in the CCU and underwent a HM 3 implant as DT on . He was weaned off of pressors and transferred to [place] [address] where he continues to undergo PT/OT and hemodynamic optimization.   
  
24Hr Events Right pigtail cath with minimal drainage Continues to have epistaxis and blood clots in nares Dobhoff removed this am 
Anxiety regarding his health Procedure:  Procedure(s): REMOVAL TEMPORARY LEFT VENTRICULAR ASSIST DEVICE, IMPLANTATION OF LEFT VENTRICULAR ASSIST DEVICE PERMANENT (VAD), ECC, JACQUE, EPI AORTIC US BY DR Leo Jordan.    
POD:  26 
  
Impression / Plan:  
Heart Failure Status: NYHA Class IV Chronic systolic heart failure due to ICM, NYHA Class IV, EF < 15%, cardiogenic shock bridged with Impella 5.0 support to HM 3 implant S/p Impella removal and LVAD implant 11/18/19 Continue RPMs to 6000 rpm  
Consider CTA of outflow cannula when renal function has recovered due to inability to offload LV on high speed TTE with bubble study negative for PFO Increase milrinone to 0.3 mcg/kg/min Obtain daily CardioMEMs readings when in stepdown unit Continue Bumex 2 mg IV BID Increase Sildenafil to 40 mg PO TID - rx sent to local pharmacy to initiate PA Intolerant of BB due to RV failure Intolerant of ACEi/ARB/ARNI/AA due to JUAN J on CKD 3 Strict I/O, daily weights, Na+ restricted diet Continue LVAD education Daily dressing changes until POD 30 TTE tomorrow Generalized myoclonus Improved Appreciate Neurology input Continue Keppra with renal dosing - consider decreased dosing due to somnolence Head CT negative for acute process EEG suggestive of mild generalized encephalopathic process, possibly related to underlying structural brain injury vs metabolic abnormality Monitor closely  
  
Anticoagulation for LVAD, INR goal 2-3 Continue ASA INR 1.8  
5 mg warfarin tonight Epistaxis Afrin soaked guaze Consult ENT 
  
Acute Respiratory Failure post op Improved with aggressive diuresis, but still c/o dyspnea CXR and O2 requirement improved ABG acceptable TTE with Bubble study negative for PFO Pulmonary Consult appreciated Pulmonary hygiene Cont home CPAP- must use while sleeping Thoracentesis with pigtail cath - minimal drainage Increase milrinone dose to 0.3 mcgs/kg/min 
TTE tomorrow Acute on CKD 3, atrophic left kidney Appreciate Nephrology assistance Watch Cr - improving Daily diuretic dosing Avoid nephrotoxins, trend labs Renally dose meds  
  
CAD s/p CABG Cont low dose ASA- watch platelets No BB d/t RV failure Hold statin due to recent hepatic failure LHC performed 11/13 - low likelihood of benefit from revascularization  
  
Hx of VF arrest s/p BiV-ICD No recent shocks Keep K > 4 and Mg > 2  
   
PAF Dig level 0.8 - resume low dose dig today (62.5 mcg qMWF) No BB due to RV failure Repeat TFTs WNL 
  
Hx of gout Uric acid WNL Acute blood loss anemia Likely due to hematuria and epistaxis Begin octreotide 25 mcg SQ TID Transfuse to keep Hgb > 7.5 Check FOB , UA 
  
Suspected aspiration pneumonia Sputum culture showing scant growth of yeast 
Begin cefepime for UTI - should cross cover PNA Urinary retention, c/b serratia UTI and hematuria Appreciate Urology consult - asked to see again due to new hematuria, suspected recurrence of UTI Repeat UA with cx Begin cefepime Cystoscopy performed 11/13 shows catheter induced cystitis Sepsis Alert Paired blood cultures Repeat urine culture Sputum culture Malnutrition Appreciate Nutritionist consult Prealbumin weekly - 10.1 on 12/9 Continue pureed diet with nectar thick liquids Cont TF via dobhoff - at goal  
Intolerant of mirtazapine d/t tremors, confusion  
  
COPD Appreciate Pulmonology assistance Continue nebs PRN   
  
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  
  
Hx of sternal wound infection, sternectomy Sternum closed post op- monitor  
  
Vocal cord paralysis Improved ENT recs appreciated Neck CT- R neck edema, no airway compromise Speech therapy following - appreciate input Anxiety Klonipin 0.5 mg TID prn anxiety 
  
Debility Continue PT/OT  
  
Ppx Protonix PT/OT Bowel regimen Continue pureed diet with nectar thick liquids PICC placed 11/22/19  
  
Dispo: Will need IP rehab. Thank you for letting us see him with you, Mounika Boyer MD, Kin  Chief of Cardiology, [facility] Medical Director Calos Rueda [address], [address] Office [phone] Fax [phone] LVAD INTERROGATION: 
Device interrogated in person Device function normal, normal flow, frequent PI events LVAD Pump Speed (RPM): 6000 Pump Flow (LPM): 5.4 MAP: 72 
PI (Pulsitility Index): 3.5 Power: 4.8  Test: Yes 
Back Up  at Bedside & Labeled: Yes Power Module Test: Yes Driveline Site Care: No 
Driveline Dressing: Clean, Dry, and Intact Outpatient: No 
MAP in Therapeutic Range (Outpatient): Yes Testing Alarms Reviewed: Yes 
Back up SC speed: 6000 Back up Low Speed Limit: 5600 Emergency Equipment with Patient?: Yes Emergency procedures reviewed?: Yes Drive line site inspected?: Yes Drive line intergrity inspected?: Yes Drive line dressing changed?: No 
 
PHYSICAL EXAM: 
Visit Vitals BP 91/72 (BP 1 Location: Left arm, BP Patient Position: At rest) Pulse 92 Temp 98 °F (36.7 °C) Resp 22 Ht 6' 2\" (1.88 m) Wt 182 lb 15.7 oz (83 kg) SpO2 92% BMI 23.49 kg/m² Physical Exam  
Constitutional: He appears well-developed. He appears lethargic. He appears cachectic. No distress. More fatigued today HENT:  
Head: Normocephalic. Neck: Normal range of motion. Neck supple. No JVD present. Cardiovascular: Regular rhythm. + VAD hum Pulmonary/Chest: Effort normal and breath sounds normal. No respiratory distress. Abdominal: Soft. Bowel sounds are normal. He exhibits no distension. Dobhoff in place Genitourinary:    Genitourinary Comments: Hematuria Musculoskeletal: Normal range of motion. General: No edema. Neurological: He appears lethargic. Skin: Skin is warm and dry. Nursing note and vitals reviewed. REVIEW OF SYSTEMS: 
Review of Systems Constitutional: Positive for malaise/fatigue. Negative for chills and fever. HENT: Positive for hearing loss. Respiratory: Positive for shortness of breath. Negative for cough. Mild dyspnea Cardiovascular: Negative for chest pain, palpitations, orthopnea and leg swelling. Gastrointestinal: Negative for heartburn, nausea and vomiting. Genitourinary: Negative. Musculoskeletal: Negative. Neurological: Positive for weakness. Negative for dizziness and headaches. Endo/Heme/Allergies: Negative. PAST MEDICAL HISTORY: 
Past Medical History:  
Diagnosis Date  Degenerative disc disease, lumbar  Heart failure ([facility]  High cholesterol  Hypertension  Paroxysmal atrial fibrillation ([facility] 4/2/2019  Spinal stenosis PAST SURGICAL HISTORY: 
Past Surgical History:  
Procedure Laterality Date  COLONOSCOPY Left 11/11/2019 COLONOSCOPY at bedside performed by Deo Pagan MD at [facility]  HX CORONARY ARTERY BYPASS GRAFT    
 triple  HX HERNIA REPAIR    
 HX IMPLANTABLE CARDIOVERTER DEFIBRILLATOR  WI CARDIOVERSION ELECTIVE ARRHYTHMIA EXTERNAL N/A 6/10/2019 EP CARDIOVERSION performed by Aury David MD at [place] [address], [address] CATH LAB  WI CARDIOVERSION ELECTIVE ARRHYTHMIA EXTERNAL N/A 6/18/2019 EP CARDIOVERSION performed by Ina Luque MD at [place] [address], [address] CATH LAB  WI INSJ/RPLCMT PERM DFB W/TRNSVNS LDS 1/DUAL CHMBR N/A 6/21/2019 INSERT ICD BIV MULTI performed by Xenia Pat MD at [place] [address], [address] CATH LAB  [place] TCAT IMPL WRLS P-ART PRS SNR L-T HEMODYN MNTR N/A 9/18/2019 IMPLANT HEART FAILURE MONITORING DEVICE performed by John Glover MD at [address], [facility] [address] [facility] FAMILY HISTORY: 
Family History Problem Relation Age of Onset  Lung Disease Mother  Hypertension Mother [facility] Arthritis-osteo Mother  Heart Disease Mother  Heart Disease Father  Diabetes Father SOCIAL HISTORY: 
Social History Socioeconomic History  Marital status:  Spouse name: Not on file  Number of children: Not on file  Years of education: Not on file  Highest education level: Not on file Tobacco Use  Smoking status: Former Smoker Last attempt to quit: 2010 Years since quittin.0  Smokeless tobacco: Never Used Substance and Sexual Activity  Alcohol use: Not Currently Comment: rarely  Drug use: Never Other Topics Concern LABORATORY RESULTS: 
  
Labs Latest Ref Rng & Units 12/15/2019 2019 2019 2019 2019 12/10/2019 2019 WBC 4.1 - 11.1 K/uL 5.2 4.7 4.7 4.5 4.2 4.8 5.2  
RBC 4.10 - 5.70 M/uL 2.69(L) 2.70(L) 2.56(L) 2.63(L) 2.64(L) 2.63(L) 2.67(L) Hemoglobin 12.1 - 17.0 g/dL 7. 8(L) 7. 8(L) 7. 5(L) 7. 8(L) 7. 8(L) 7. 8(L) 7. 9(L) Hematocrit 36.6 - 50.3 % 25. 8(L) 26. 1(L) 25. 0(L) 25. 9(L) 26. 3(L) 26. 7(L) 26. 9(L) MCV 80.0 - 99.0 FL 95.9 96.7 97.7 98.5 99. 6(H) 101. 5(H) 100. 7(H) Platelets 467 - 712 K/uL 175 162 157 149(L) 131(L) 138(L) 134(L) Lymphocytes 12 - 49 % - - - - - - - Monocytes 5 - 13 % - - - - - - - Eosinophils 0 - 7 % - - - - - - - Basophils 0 - 1 % - - - - - - - Albumin 3.5 - 5.0 g/dL 2. 3(L) 2. 5(L) 2. 5(L) 2. 5(L) 2. 4(L) 2. 6(L) 2. 6(L) Calcium 8.5 - 10.1 MG/DL 8.6 9.0 9.0 9.0 8.5 8.8 9.0 SGOT 15 - 37 U/L 12(L) 14(L) 18 16 15 18 15 Glucose 65 - 100 mg/dL 233(H) 163(H) 123(H) 105(H) 97 96 120(H) BUN 6 - 20 MG/DL 40(H) 38(H) 39(H) 38(H) 38(H) 40(H) 39(H) Creatinine 0.70 - 1.30 MG/DL 2.21(H) 2.31(H) 2.44(H) 2.56(H) 2.72(H) 3.08(H) 3.43(H) Sodium 136 - 145 mmol/L 138 140 140 140 142 143 143 Potassium 3.5 - 5.1 mmol/L 3.4(L) 3.8 3.9 4.0 4.1 4.2 4.2 TSH 0.36 - 3.74 uIU/mL - - - - - - -  
PSA 0.01 - 4.0 ng/mL - - - - - - -  
LDH 85 - 241 U/L 210 242(H) 267(H) 249(H) 240 257(H) 257(H) CEA ng/mL - - - - - - - Some recent data might be hidden Lab Results Component Value Date/Time TSH 2.30 12/03/2019 03:29 AM  
 TSH 2.40 10/25/2019 07:39 PM  
 TSH 2.45 06/01/2019 04:16 AM  
 
 
ALLERGY: 
No Known Allergies CURRENT MEDICATIONS: 
Current Facility-Administered Medications Medication Dose Route Frequency  oxymetazoline (AFRIN) 0.05 % nasal spray 2 Spray  2 Spray Both Nostrils BID  warfarin (COUMADIN) tablet 5 mg  5 mg Oral ONCE  
 sildenafil (pulm.hypertension) (REVATIO) tablet 40 mg  40 mg Oral TID  bumetanide (BUMEX) injection 2 mg  2 mg IntraVENous BID  magnesium oxide (MAG-OX) tablet 400 mg  400 mg Oral BID  diphenhydrAMINE (BENADRYL) capsule 25 mg  25 mg Oral QHS PRN  
 octreotide (SANDOSTATIN) injection 25 mcg  25 mcg SubCUTAneous TID  benzocaine-menthol (CEPACOL) lozenge 1 Lozenge  1 Lozenge Mucous Membrane PRN  
 cefepime (MAXIPIME) 2 g in 0.9% sodium chloride (MBP/ADV) 100 mL  2 g IntraVENous Q24H  
 digoxin (LANOXIN) tablet 0.0625 mg  62.5 mcg Oral Q MON, WED & FRI  milrinone (PRIMACOR) 20 MG/100 ML D5W infusion  0.2 mcg/kg/min IntraVENous CONTINUOUS  
 levETIRAcetam (KEPPRA) oral solution 250 mg  250 mg Oral Q12H  pantoprazole (PROTONIX) tablet 40 mg  40 mg Oral ACB  aspirin chewable tablet 81 mg  81 mg Oral DAILY  0.9% sodium chloride infusion  3 mL/hr IntraVENous CONTINUOUS  
 albuterol-ipratropium (DUO-NEB) 2.5 MG-0.5 MG/3 ML  3 mL Nebulization Q4H RT  
 allopurinol (ZYLOPRIM) tablet 100 mg  100 mg Oral DAILY  arformoterol (BROVANA) neb solution 15 mcg  15 mcg Nebulization BID RT  And  
 budesonide (PULMICORT) 500 mcg/2 ml nebulizer suspension  500 mcg Nebulization BID RT  
  artificial saliva (MOUTH KOTE) 1 Spray  1 Spray Oral PRN  
 lactobac ac& pc-s.therm-b.anim (TIAN Q/RISAQUAD)  1 Cap Oral DAILY  oxyCODONE IR (ROXICODONE) tablet 5 mg  5 mg Oral Q6H PRN  
 balsam peru-castor oil (VENELEX) ointment   Topical TID  tamsulosin (FLOMAX) capsule 0.4 mg  0.4 mg Oral DAILY  insulin lispro (HUMALOG) injection   SubCUTAneous AC&HS  Warfarin MD/NP dosing   Other PRN  
 epoetin yvonne-epbx (RETACRIT) injection 20,000 Units  20,000 Units SubCUTAneous Q7D  
 sodium chloride (NS) flush 5-40 mL  5-40 mL IntraVENous Q8H  
 sodium chloride (NS) flush 5-40 mL  5-40 mL IntraVENous PRN  
 acetaminophen (TYLENOL) tablet 650 mg  650 mg Oral Q6H PRN  
 naloxone (NARCAN) injection 0.4 mg  0.4 mg IntraVENous PRN  
 ondansetron (ZOFRAN) injection 4 mg  4 mg IntraVENous Q4H PRN  
 senna-docusate (PERICOLACE) 8.6-50 mg per tablet 1 Tab  1 Tab Oral DAILY  bisacodyl (DULCOLAX) tablet 5 mg  5 mg Oral DAILY PRN  
 sucralfate (CARAFATE) tablet 1 g  1 g Oral AC&HS  influenza vaccine 2019-20 (6 mos+)(PF) (FLUARIX/FLULAVAL/FLUZONE QUAD) injection 0.5 mL  0.5 mL IntraMUSCular PRIOR TO DISCHARGE  hydrALAZINE (APRESOLINE) 20 mg/mL injection 20 mg  20 mg IntraVENous Q6H PRN  
 dextrose 10 % infusion 125-250 mL  125-250 mL IntraVENous PRN Thank you for allowing me to participate in this patient's care. MD Calos Rasheed [address], Suite [facility] Phone: [phone] Fax: [phone]

## 2019-12-15 NOTE — PROGRESS NOTES
Physical Therapy Note: 
Chart reviewed and spoke with nursing for clearance. The patient has had a persistent nose bleed for hours and presents with extreme fatigue. Per discussion, nsg asked to defer for now. Will follow up tomorrow per POC. Becca España, PT, DPT

## 2019-12-16 NOTE — PROGRESS NOTES
Chart reviewed. RN reported that she is educating patient/wife on dressing change for driveline at this time. Will follow up for OT intervention as able. Thank you.

## 2019-12-16 NOTE — PROGRESS NOTES
[address] [facility] in [place], [place] Inpatient Progress Note Patient name: Natasha Flanagan Patient : 1950 Patient MRN: [id number] Attending MD: Alicia Triana MD 
Date of service: 19 Chief Complaint:  
Gunter azucena LVAD implant 
  
HPI: Sona Kiser is a 71y.o. year old pleasant white male with a history of HTN, HLD, JOSE, CAD s/p cardiac arrest VFib s/p CABG () c/b sternal would infection and sternectomy, ischemic cardiomyopathy LVEF 15-20%, s/p ICD and with LBBB. He was admitted with acute on chronic systolic heart failure with massive volume overload > 20 lbs, in the setting of atrial fibrillation s/p failed DCCV and underwent DCCV and RHC on .  S/p BiVICD on 19 with Dr. Claus Stovall. He was discharged home home on IV milrinone on 19. Margret Ramirez has been followed closely by Dr. Aura Sorto and the [facility]. 
  
Mr. Kiser was admitted for acute on chronic systolic heart failure. He underwent implantation of Impella 5.0 due to CS and has completed his LVAD evaluation. Margret Ramirez meets criteria for implant of HM3 as DT. He was NYHA Class IV prior to implant of Impella 5.0, has LVEF < 15%, was intolerant of GDMT due to symptomatic hypotension and renal dysfunction. He remains dependent on temporary MCS support. RHC  revealed compensated hemodynamics on Impella. His renal function has improved dramatically with improvement in his hemodynamics. He is not a suitable transplant candidate due to single kidney, sternectomy, debility, and frailty. He was reviewed by Eduin Liriano and felt to be a high risk heart transplant candidate due to multiple co-morbidities as well as the afore mentioned conditions.  He remained in the CCU and underwent a HM 3 implant as DT on . He was weaned off of pressors and transferred to [place] [address] where he continues to undergo PT/OT and hemodynamic optimization.   
  
24Hr Events Right pigtail cath with no drainage Continues to have epistaxis and blood clots in nares INR therapeutic Procedure:  Procedure(s): REMOVAL TEMPORARY LEFT VENTRICULAR ASSIST DEVICE, IMPLANTATION OF LEFT VENTRICULAR ASSIST DEVICE PERMANENT (VAD), ECC, JACQUE, EPI AORTIC US BY DR Jeyson Hicks.    
POD:  27 
  
Impression / Plan:  
Heart Failure Status: NYHA Class IV Chronic systolic heart failure due to ICM, NYHA Class IV, EF < 15%, cardiogenic shock bridged with Impella 5.0 support to HM 3 implant S/p Impella removal and LVAD implant 11/18/19 Continue RPMs to 6000 rpm  
Consider CTA of outflow cannula when renal function has recovered due to inability to offload LV on high speed TTE with bubble study negative for PFO Cont milrinone to 0.3 mcg/kg/min Obtain daily CardioMEMs readings when in stepdown unit Continue Bumex 2 mg IV BID Cont Sildenafil to 40 mg PO TID - rx sent to local pharmacy to initiate PA Intolerant of BB due to RV failure Intolerant of ACEi/ARB/ARNI/AA due to JUAN J on CKD 3 Strict I/O, daily weights, Na+ restricted diet Continue LVAD education Daily dressing changes until POD 30 TTE today Generalized myoclonus Improved Appreciate Neurology input Continue Keppra with renal dosing Head CT negative for acute process EEG suggestive of mild generalized encephalopathic process, possibly related to underlying structural brain injury vs metabolic abnormality Monitor closely  
  
Anticoagulation for LVAD, INR goal 2-3 Continue ASA INR 2.2 
3mg warfarin tonight Epistaxis Afrin soaked guaze ENT consult appreciated Monitor for now  
  
Acute Respiratory Failure post op Improved with aggressive diuresis, but still c/o dyspnea CXR and O2 requirement improved ABG acceptable TTE with Bubble study negative for PFO Pulmonary Consult appreciated Pulmonary hygiene Cont home CPAP- must use while sleeping Thoracentesis with pigtail cath - minimal drainage 
 milrinone dose to 0.3 mcgs/kg/min 
TTE today Acute on CKD 3, atrophic left kidney Appreciate Nephrology assistance Watch Cr - improving Daily diuretic dosing Avoid nephrotoxins, trend labs Renally dose meds  
  
CAD s/p CABG Cont low dose ASA- watch platelets No BB d/t RV failure Hold statin due to recent hepatic failure LHC performed 11/13 - low likelihood of benefit from revascularization  
  
Hx of VF arrest s/p BiV-ICD No recent shocks Keep K > 4 and Mg > 2  
   
PAF Dig level 0.9 - resume low dose dig today (62.5 mcg qMWF) No BB due to RV failure Repeat TFTs WNL 
  
Hx of gout Uric acid WNL Acute blood loss anemia Likely due to hematuria and epistaxis Cont octreotide 25 mcg SQ TID Transfuse to keep Hgb > 7.5 Fecal occult 12/14 negative Repeat FOB today Transfuse today Consult to urology for cautery of bladder? 
  
Suspected aspiration pneumonia Sputum culture showing scant growth of yeast 
Cefepime until 12/19 Urinary retention, c/b serratia UTI and hematuria Appreciate Urology consult - asked to see again due to new hematuria, suspected recurrence of UTI Repeat UA with cx Begin cefepime Cystoscopy performed 11/13 shows catheter induced cystitis Sepsis Alert Paired Blood pending Urine cx pending Sputum cx when able Malnutrition Appreciate Nutritionist consult Prealbumin weekly - 13 on 12/16 Continue pureed diet with nectar thick liquids Intolerant of mirtazapine d/t tremors, confusion Removed Dobhoff yesterday due to bleeding  
  
COPD Appreciate Pulmonology assistance Continue nebs PRN   
  
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  
  
Hx of sternal wound infection, sternectomy Sternum closed post op- monitor  
  
Vocal cord paralysis Improved ENT recs appreciated Neck CT- R neck edema, no airway compromise Speech therapy following - appreciate input Anxiety Klonipin 0.5 mg TID prn anxiety 
  
Debility Continue PT/OT  
  
Ppx Protonix PT/OT Bowel regimen Continue pureed diet with nectar thick liquids PICC placed 11/22/19  
  
Dispo: Will need IP rehab. Patient seen and examined. Data and note reviewed. I have discussed and agree with the plans as noted. 71year old male with a history of ICM s/p LVAD as DT whose post op course has been complicated by RV dysfunction, JUAN J on CKD3, respiratory failure, debility, and epistaxis. He received 1 unit of prbcs this am. His dyspnea has improved with an increase in his LV speed. Diuresing well on his current diuretic regimen. Continue to encourage po intake, PT/OT, and LVAD education. Thank you for allowing us to participate in your patient's care. Mounika Gutierrez MD, Rajeev Vargas Chief of Cardiology, [facility] Medical Director [address] [place] [address], [address] Office [phone] Fax [phone] LVAD INTERROGATION: 
Device interrogated in person Device function normal, normal flow, frequent PI events LVAD Pump Speed (RPM): 6000 Pump Flow (LPM): 5.2 MAP: 82 
PI (Pulsitility Index): 3.8 Power: 4.8  Test: Yes 
Back Up  at Bedside & Labeled: Yes Power Module Test: Yes Driveline Site Care: No 
Driveline Dressing: Clean, Dry, and Intact Outpatient: No 
MAP in Therapeutic Range (Outpatient): Yes Testing Alarms Reviewed: Yes 
Back up SC speed: 6000 Back up Low Speed Limit: 5600 Emergency Equipment with Patient?: Yes Emergency procedures reviewed?: Yes Drive line site inspected?: Yes Drive line intergrity inspected?: Yes Drive line dressing changed?: Yes PHYSICAL EXAM: 
Visit Vitals BP 91/72 (BP 1 Location: Left arm, BP Patient Position: At rest) Pulse 76 Temp 97 °F (36.1 °C) Resp 18 Ht 6' 2\" (1.88 m) Wt 183 lb 3.2 oz (83.1 kg) SpO2 96% BMI 23.52 kg/m² Physical Exam  
Constitutional: He is oriented to person, place, and time. He appears well-developed. He appears cachectic. No distress. More fatigued today HENT:  
Head: Normocephalic. Neck: Normal range of motion. Neck supple. No JVD present. Cardiovascular: Normal rate, regular rhythm and normal heart sounds. + VAD hum Pulmonary/Chest: Effort normal and breath sounds normal. No respiratory distress. Abdominal: Soft. Bowel sounds are normal. He exhibits no distension. Dobhoff in place Genitourinary:    Genitourinary Comments: Hematuria Musculoskeletal: Normal range of motion. General: No edema. Neurological: He is alert and oriented to person, place, and time. Skin: Skin is warm and dry. Nursing note and vitals reviewed. REVIEW OF SYSTEMS: 
Review of Systems Constitutional: Positive for malaise/fatigue. Negative for chills and fever. HENT: Positive for hearing loss and nosebleeds. Respiratory: Negative for cough and shortness of breath. Mild dyspnea Cardiovascular: Negative for chest pain, palpitations, orthopnea and leg swelling. Gastrointestinal: Negative for heartburn, nausea and vomiting. Genitourinary: Positive for hematuria. Musculoskeletal: Negative. Neurological: Positive for weakness. Negative for dizziness and headaches. Endo/Heme/Allergies: Bruises/bleeds easily. PAST MEDICAL HISTORY: 
Past Medical History:  
Diagnosis Date  Degenerative disc disease, lumbar  Heart failure ([address]  High cholesterol  Hypertension  Paroxysmal atrial fibrillation ([address] 4/2/2019  Spinal stenosis PAST SURGICAL HISTORY: 
Past Surgical History:  
Procedure Laterality Date  COLONOSCOPY Left 11/11/2019 COLONOSCOPY at bedside performed by Jamia Vieira MD at [address]  HX CORONARY ARTERY BYPASS GRAFT    
 triple  HX HERNIA REPAIR    
  HX IMPLANTABLE CARDIOVERTER DEFIBRILLATOR  CT CARDIOVERSION ELECTIVE ARRHYTHMIA EXTERNAL N/A 6/10/2019 EP CARDIOVERSION performed by Dg Cook MD at [address], [facility] [address] CATH LAB  CT CARDIOVERSION ELECTIVE ARRHYTHMIA EXTERNAL N/A 2019 EP CARDIOVERSION performed by Elizabet Phillips MD at [address], [facility] CATH LAB  CT INSJ/RPLCMT PERM DFB W/TRNSVNS LDS 1/DUAL CHMBR N/A 2019 INSERT ICD BIV MULTI performed by Oren Kendrick MD at [address] CATH LAB  CT TCAT IMPL WRLS P-ART PRS SNR L-T HEMODYN MNTR N/A 2019 IMPLANT HEART FAILURE MONITORING DEVICE performed by Lisandra Fonseca MD at [address], [facility]  CATH LAB FAMILY HISTORY: 
Family History Problem Relation Age of Onset  Lung Disease Mother  Hypertension Mother [facility] Arthritis-osteo Mother  Heart Disease Mother  Heart Disease Father  Diabetes Father SOCIAL HISTORY: 
Social History Socioeconomic History  Marital status:  Spouse name: Not on file  Number of children: Not on file  Years of education: Not on file  Highest education level: Not on file Tobacco Use  Smoking status: Former Smoker Last attempt to quit: 2010 Years since quittin.0  Smokeless tobacco: Never Used Substance and Sexual Activity  Alcohol use: Not Currently Comment: rarely  Drug use: Never Other Topics Concern LABORATORY RESULTS: 
  
Labs Latest Ref Rng & Units 2019 12/15/2019 12/15/2019 2019 2019 2019 2019 WBC 4.1 - 11.1 K/uL 4. 0(L) - 5.2 4.7 4.7 4.5 4.2  
RBC 4.10 - 5.70 M/uL 2.35(L) - 2.69(L) 2.70(L) 2.56(L) 2.63(L) 2.64(L) Hemoglobin 12.1 - 17.0 g/dL 6. 9(L) 7. 8(L) 7. 8(L) 7. 8(L) 7. 5(L) 7. 8(L) 7. 8(L) Hematocrit 36.6 - 50.3 % 22. 3(L) 25. 4(L) 25. 8(L) 26. 1(L) 25. 0(L) 25. 9(L) 26. 3(L) MCV 80.0 - 99.0 FL 94.9 - 95.9 96.7 97.7 98.5 99. 6(H) Platelets 437 - 831 K/uL 167 - 175 162 157 149(L) 131(L) Lymphocytes 12 - 49 % - - - - - - - Monocytes 5 - 13 % - - - - - - - Eosinophils 0 - 7 % - - - - - - - Basophils 0 - 1 % - - - - - - - Albumin 3.5 - 5.0 g/dL 2. 3(L) - 2. 3(L) 2. 5(L) 2. 5(L) 2. 5(L) 2. 4(L) Calcium 8.5 - 10.1 MG/DL 8.8 - 8.6 9.0 9.0 9.0 8.5 SGOT 15 - 37 U/L 14(L) - 12(L) 14(L) 18 16 15 Glucose 65 - 100 mg/dL 103(H) - 233(H) 163(H) 123(H) 105(H) 97 BUN 6 - 20 MG/DL 43(H) - 40(H) 38(H) 39(H) 38(H) 38(H) Creatinine 0.70 - 1.30 MG/DL 2.15(H) - 2.21(H) 2.31(H) 2.44(H) 2.56(H) 2.72(H) Sodium 136 - 145 mmol/L 138 - 138 140 140 140 142 Potassium 3.5 - 5.1 mmol/L 3.5 - 3.4(L) 3.8 3.9 4.0 4.1 TSH 0.36 - 3.74 uIU/mL - - - - - - -  
PSA 0.01 - 4.0 ng/mL - - - - - - -  
LDH 85 - 241 U/L 214 - 210 242(H) 267(H) 249(H) 240 CEA ng/mL - - - - - - - Some recent data might be hidden Lab Results Component Value Date/Time TSH 2.30 12/03/2019 03:29 AM  
 TSH 2.40 10/25/2019 07:39 PM  
 TSH 2.45 06/01/2019 04:16 AM  
 
 
ALLERGY: 
No Known Allergies CURRENT MEDICATIONS: 
Current Facility-Administered Medications Medication Dose Route Frequency  potassium chloride SR (KLOR-CON 10) tablet 20 mEq  20 mEq Oral DAILY  oxymetazoline (AFRIN) 0.05 % nasal spray 2 Spray  2 Spray Both Nostrils BID  spironolactone (ALDACTONE) tablet 25 mg  25 mg Oral DAILY  clonazePAM (KlonoPIN) tablet 0.5 mg  0.5 mg Oral TID PRN  
 milrinone (PRIMACOR) 20 MG/100 ML D5W infusion  0.3 mcg/kg/min IntraVENous CONTINUOUS  
 sildenafil (pulm.hypertension) (REVATIO) tablet 40 mg  40 mg Oral TID  bumetanide (BUMEX) injection 2 mg  2 mg IntraVENous BID  magnesium oxide (MAG-OX) tablet 400 mg  400 mg Oral BID  diphenhydrAMINE (BENADRYL) capsule 25 mg  25 mg Oral QHS PRN  
 octreotide (SANDOSTATIN) injection 25 mcg  25 mcg SubCUTAneous TID  benzocaine-menthol (CEPACOL) lozenge 1 Lozenge  1 Lozenge Mucous Membrane PRN  
 cefepime (MAXIPIME) 2 g in 0.9% sodium chloride (MBP/ADV) 100 mL  2 g IntraVENous Q24H  
 digoxin (LANOXIN) tablet 0.0625 mg  62.5 mcg Oral Q MON, WED & FRI  levETIRAcetam (KEPPRA) oral solution 250 mg  250 mg Oral Q12H  pantoprazole (PROTONIX) tablet 40 mg  40 mg Oral ACB  aspirin chewable tablet 81 mg  81 mg Oral DAILY  0.9% sodium chloride infusion  3 mL/hr IntraVENous CONTINUOUS  
 albuterol-ipratropium (DUO-NEB) 2.5 MG-0.5 MG/3 ML  3 mL Nebulization Q4H RT  
 allopurinol (ZYLOPRIM) tablet 100 mg  100 mg Oral DAILY  arformoterol (BROVANA) neb solution 15 mcg  15 mcg Nebulization BID RT And  
 budesonide (PULMICORT) 500 mcg/2 ml nebulizer suspension  500 mcg Nebulization BID RT  
 artificial saliva (MOUTH KOTE) 1 Spray  1 Spray Oral PRN  
 lactobac ac& pc-s.therm-b.anim (TIAN Q/RISAQUAD)  1 Cap Oral DAILY  oxyCODONE IR (ROXICODONE) tablet 5 mg  5 mg Oral Q6H PRN  
 balsam peru-castor oil (VENELEX) ointment   Topical TID  tamsulosin (FLOMAX) capsule 0.4 mg  0.4 mg Oral DAILY  insulin lispro (HUMALOG) injection   SubCUTAneous AC&HS  Warfarin MD/NP dosing   Other PRN  
 epoetin yvonne-epbx (RETACRIT) injection 20,000 Units  20,000 Units SubCUTAneous Q7D  
 sodium chloride (NS) flush 5-40 mL  5-40 mL IntraVENous Q8H  
 sodium chloride (NS) flush 5-40 mL  5-40 mL IntraVENous PRN  
 acetaminophen (TYLENOL) tablet 650 mg  650 mg Oral Q6H PRN  
 naloxone (NARCAN) injection 0.4 mg  0.4 mg IntraVENous PRN  
 ondansetron (ZOFRAN) injection 4 mg  4 mg IntraVENous Q4H PRN  
 senna-docusate (PERICOLACE) 8.6-50 mg per tablet 1 Tab  1 Tab Oral DAILY  bisacodyl (DULCOLAX) tablet 5 mg  5 mg Oral DAILY PRN  
 sucralfate (CARAFATE) tablet 1 g  1 g Oral AC&HS  influenza vaccine 2019-20 (6 mos+)(PF) (FLUARIX/FLULAVAL/FLUZONE QUAD) injection 0.5 mL  0.5 mL IntraMUSCular PRIOR TO DISCHARGE  hydrALAZINE (APRESOLINE) 20 mg/mL injection 20 mg  20 mg IntraVENous Q6H PRN  
 dextrose 10 % infusion 125-250 mL  125-250 mL IntraVENous PRN  
 
 
 
 Thank you for allowing me to participate in this patient's care. TIERRA Gomez [address] [facility] 
[address] [facility], [address] Phone: [phone] Fax: [phone]

## 2019-12-16 NOTE — PROGRESS NOTES
0730: Bedside shift change report given to Alex Dominguez (oncoming nurse) by Irene Wild (offgoing nurse). Report included the following information SBAR and Kardex. Problem: Falls - Risk of 
Goal: *Absence of Falls Description Document Fatou Getting Fall Risk and appropriate interventions in the flowsheet. Outcome: Progressing Towards Goal 
Note: Fall Risk Interventions: 
Mobility Interventions: Assess mobility with egress test, OT consult for ADLs, Patient to call before getting OOB, PT Consult for mobility concerns, Strengthening exercises (ROM-active/passive) Mentation Interventions: Door open when patient unattended, Evaluate medications/consider consulting pharmacy, Eyeglasses and hearing aids, Familiar objects from home, Gait belt with transfers/ambulation, Increase mobility, More frequent rounding, Reorient patient, Room close to nurse's station, Update white board Medication Interventions: Evaluate medications/consider consulting pharmacy, Patient to call before getting OOB, Teach patient to arise slowly Elimination Interventions: Call light in reach, Patient to call for help with toileting needs, Toileting schedule/hourly rounds History of Falls Interventions: Bed/chair exit alarm, Consult care management for discharge planning, Door open when patient unattended, Evaluate medications/consider consulting pharmacy, Room close to nurse's station Problem: Pain Goal: *Control of Pain Outcome: Progressing Towards Goal 
  
Problem: Pressure Injury - Risk of 
Goal: *Prevention of pressure injury Description Document Mich Scale and appropriate interventions in the flowsheet.  
Outcome: Progressing Towards Goal 
Note: Pressure Injury Interventions: 
Sensory Interventions: Assess changes in LOC, Assess need for specialty bed, Check visual cues for pain, Discuss PT/OT consult with provider, Keep linens dry and wrinkle-free, Maintain/enhance activity level, Pressure redistribution bed/mattress (bed type), Turn and reposition approx. every two hours (pillows and wedges if needed) Moisture Interventions: Internal/External urinary devices, Maintain skin hydration (lotion/cream), Moisture barrier, Apply protective barrier, creams and emollients, Check for incontinence Q2 hours and as needed Activity Interventions: Increase time out of bed, Pressure redistribution bed/mattress(bed type), PT/OT evaluation Mobility Interventions: Float heels, HOB 30 degrees or less, Pressure redistribution bed/mattress (bed type), PT/OT evaluation, Turn and reposition approx. every two hours(pillow and wedges) Nutrition Interventions: Document food/fluid/supplement intake Friction and Shear Interventions: HOB 30 degrees or less, Lift sheet Problem: Heart Failure: Discharge Outcomes Goal: *Demonstrates ability to perform prescribed activity without shortness of breath or discomfort Outcome: Progressing Towards Goal 
  
Problem: Diabetes Self-Management Goal: *Disease process and treatment process Description Define diabetes and identify own type of diabetes; list 3 options for treating diabetes.  
Outcome: Progressing Towards Goal

## 2019-12-16 NOTE — PROGRESS NOTES
[facility] VAD/Heart Failure Progress Note Admit Date: 10/25/2019 POD:  21 Days Post-Op Procedure:  Procedure(s): LEFT BRACHIAL THROMBECTOMY Subjective:  
Mild dyspnea, fatigue, and weakness; working on hematuria Objective:  
Vitals: 
Blood pressure 91/72, pulse 87, temperature 97.1 °F (36.2 °C), resp. rate 16, height 6' 2\" (1.88 m), weight 183 lb 3.2 oz (83.1 kg), SpO2 97 %. Temp (24hrs), Av.7 °F (36.5 °C), Min:97 °F (36.1 °C), Max:98.3 °F (36.8 °C) Hemodynamics: 
 CO: CO (l/min): 5.8 l/min CI: CI (l/min/m2): 2.8 l/min/m2 CVP: CVP (mmHg): 4 mmHg (19 1645) SVR: SVR (dyne*sec)/cm5: 1080 (dyne*sec)/cm5 (19 1200) PAP Systolic: PAP Systolic: 34 (54/99/41 6068) PAP Diastolic: PAP Diastolic: 13 (93/04/11 5894) PVR:   
 SV02: SVO2 (%): 67 % (19 1300) SCV02: SCVO2 (%): 75 % (10/29/19 1900) VAD Interrogation: LVAD (Heartmate) Pump Speed (RPM): 6000 Pump Flow (LPM): 5.2 PI (Pulsitility Index): 3.8 Power: 4.8 MAP: 82  Test: Yes 
Back Up  at Bedside & Labeled: Yes Power Module Test: Yes Driveline Site Care: No 
Driveline Dressing: Clean, Dry, and Intact EKG/Rhythm:   
 
Extubation Date / Time:  
 
CT Output:  
 
Ventilator: 
Ventilator Volumes Vt Set (ml): 550 ml (19 08) Vt Exhaled (Machine Breath) (ml): 639 ml (19 08) Vt Spont (ml): 437 ml (19 1035) Ve Observed (l/min): 7.25 l/min (19 1035) Oxygen Therapy: 
Oxygen Therapy O2 Sat (%): 97 % (19 1112) Pulse via Oximetry: 84 beats per minute (19) O2 Device: Nasal cannula (19) O2 Flow Rate (L/min): 2 l/min (19) FIO2 (%): 21 % (19 1721) CXR: 
 
Admission Weight: Last Weight Weight: 192 lb 10.9 oz (87.4 kg) Weight: 183 lb 3.2 oz (83.1 kg) Intake / Darcia Schneiders / Drain: 
Current Shift:  07 - 1900 In: 400 [P.O.:400] Out: 700 [Urine:700] Last 24 hrs.:  
 
 Intake/Output Summary (Last 24 hours) at 12/16/2019 1317 Last data filed at 12/16/2019 8084 Gross per 24 hour Intake 910 ml Output 2300 ml Net -1390 ml No results for input(s): CPK, CKMB, TROIQ in the last 72 hours. Recent Labs 12/16/19 
0410 12/15/19 
1537 12/15/19 
0332 12/14/19 
3187   --  138 140  
K 3.5  --  3.4* 3.8 CO2 34*  --  31 30 BUN 43*  --  40* 38* CREA 2.15*  --  2.21* 2.31* *  --  233* 163* MG 2.0  --  1.8 2.1 WBC 4.0*  --  5.2 4.7 HGB 6.9* 7.8* 7.8* 7.8* HCT 22.3* 25.4* 25.8* 26.1*  
  --  175 162 Recent Labs 12/16/19 
3727 12/16/19 
0410 12/15/19 
6662 12/14/19 
3712 INR  --  2.2* 1.8* 1.6* PTP  --  21.5* 17.5* 15.7* APTT 35.6*  --  31.9 32.2* No lab exists for component: PBNP Current Facility-Administered Medications:  
  potassium chloride SR (KLOR-CON 10) tablet 20 mEq, 20 mEq, Oral, DAILY, Levi, Shana B, NP, 20 mEq at 12/16/19 6668   warfarin (COUMADIN) tablet 3 mg, 3 mg, Oral, ONCE, Levi, Shana B, NP 
  albuterol-ipratropium (DUO-NEB) 2.5 MG-0.5 MG/3 ML, 3 mL, Nebulization, Q6H RT, Jaiden Andrews MD 
  oxymetazoline (AFRIN) 0.05 % nasal spray 2 Spray, 2 Spray, Both Nostrils, BID, Douglas Altman MD, 2 Lynn at 12/16/19 1350   spironolactone (ALDACTONE) tablet 25 mg, 25 mg, Oral, DAILY, Mounika Altman MD, 25 mg at 12/16/19 2706   clonazePAM (KlonoPIN) tablet 0.5 mg, 0.5 mg, Oral, TID PRN, Mounika Altman MD, 0.5 mg at 12/15/19 1342 
  milrinone (PRIMACOR) 20 MG/100 ML D5W infusion, 0.3 mcg/kg/min, IntraVENous, CONTINUOUS, Mounika Altman MD, Last Rate: 7.5 mL/hr at 12/16/19 0731, 0.3 mcg/kg/min at 12/16/19 5963   sildenafil (pulm.hypertension) (REVATIO) tablet 40 mg, 40 mg, Oral, TID, Mounika Altman MD, 40 mg at 12/16/19 4764   bumetanide (BUMEX) injection 2 mg, 2 mg, IntraVENous, BID, Mounika Altman MD, 2 mg at 12/16/19 9381   magnesium oxide (MAG-OX) tablet 400 mg, 400 mg, Oral, BID, Sharon Paigeonur Dillon, NP, 400 mg at 12/16/19 5926   diphenhydrAMINE (BENADRYL) capsule 25 mg, 25 mg, Oral, QHS PRN, Garo Herrera, NP 
  octreotide (SANDOSTATIN) injection 25 mcg, 25 mcg, SubCUTAneous, TID, Garo Herrera, NP, 25 mcg at 12/16/19 1302   benzocaine-menthol (CEPACOL) lozenge 1 Lozenge, 1 Lozenge, Mucous Membrane, PRN, Garo Herrera, NP 
  cefepime (MAXIPIME) 2 g in 0.9% sodium chloride (MBP/ADV) 100 mL, 2 g, IntraVENous, Q24H, Jamee Herrerars T, NP, Last Rate: 200 mL/hr at 12/16/19 1301, 2 g at 12/16/19 1301   digoxin (LANOXIN) tablet 0.0625 mg, 62.5 mcg, Oral, Q MON, WED & FRI, Jamee Herrerars T, NP, 0.0625 mg at 12/13/19 1756   levETIRAcetam (KEPPRA) oral solution 250 mg, 250 mg, Oral, Q12H, Jamee Herrera Savers T, NP, 250 mg at 12/16/19 3088   pantoprazole (PROTONIX) tablet 40 mg, 40 mg, Oral, ACB, Jamee Herrerars T, NP, 40 mg at 12/16/19 0297   aspirin chewable tablet 81 mg, 81 mg, Oral, DAILY, Rivas Andrews MD, 81 mg at 12/16/19 0843 
  0.9% sodium chloride infusion, 3 mL/hr, IntraVENous, CONTINUOUS, Jamee Herrera T, NP, Last Rate: 5 mL/hr at 12/13/19 0800, 5 mL/hr at 12/13/19 0800 
  allopurinol (ZYLOPRIM) tablet 100 mg, 100 mg, Oral, DAILY, Jamee Herrerars T, NP, 100 mg at 12/16/19 0843 
  arformoterol (BROVANA) neb solution 15 mcg, 15 mcg, Nebulization, BID RT, 15 mcg at 12/13/19 2106 **AND** budesonide (PULMICORT) 500 mcg/2 ml nebulizer suspension, 500 mcg, Nebulization, BID RT, Sharon, Jamee LOPEZ, NP, 500 mcg at 12/16/19 0992 
  artificial saliva (MOUTH KOTE) 1 Spray, 1 Spray, Oral, PRN, Sharon, Garo Dillon NP, 1 Spray at 12/12/19 1452   lactobac ac& pc-s.therm-b.anim (TIAN Q/RISAQUAD), 1 Cap, Oral, DAILY, Tan Madison MD, 1 Cap at 12/16/19 2862   oxyCODONE IR (ROXICODONE) tablet 5 mg, 5 mg, Oral, Q6H PRN, Dylan Lombardo MD, 5 mg at 12/07/19 7110   balsam peru-castor oil (VENELEX) ointment, , Topical, TID, Mack Andrews MD 
  [facility]) capsule 0.4 mg, 0.4 mg, Oral, DAILY, Logan Kincaid MD, 0.4 mg at 12/16/19 0843 
  insulin lispro (HUMALOG) injection, , SubCUTAneous, AC&HS, Shana Sims, NP, 2 Units at 12/16/19 1301   Warfarin MD/NP dosing, , Other, PRN, Mounika Altman MD 
  epoetin yvonne-epbx (RETACRIT) injection 20,000 Units, 20,000 Units, SubCUTAneous, Q7D, Laina Souza MD, 20,000 Units at 12/10/19 1969   sodium chloride (NS) flush 5-40 mL, 5-40 mL, IntraVENous, Q8H, Nick Sousa MD, 40 mL at 12/16/19 8445   sodium chloride (NS) flush 5-40 mL, 5-40 mL, IntraVENous, PRN, Nick Sousa MD 
  acetaminophen (TYLENOL) tablet 650 mg, 650 mg, Oral, Q6H PRN, Nick Sousa MD, 650 mg at 12/10/19 0105 
  [facility]) injection 0.4 mg, 0.4 mg, IntraVENous, PRN, Nick Sousa MD 
  ondansetron [facility]) injection 4 mg, 4 mg, IntraVENous, Q4H PRN, Nick Sousa MD, 4 mg at 12/15/19 1505   senna-docusate (PERICOLACE) 8.6-50 mg per tablet 1 Tab, 1 Tab, Oral, DAILY, Nick Sousa MD, 1 Tab at 12/16/19 8578   bisacodyl (DULCOLAX) tablet 5 mg, 5 mg, Oral, DAILY PRN, Nick Sousa MD 
  sucralfate (CARAFATE) tablet 1 g, 1 g, Oral, AC&HS, Nick Sousa MD, 1 g at 12/16/19 1302 
  influenza vaccine 2019-20 (6 mos+)(PF) (FLUARIX/FLULAVAL/FLUZONE QUAD) injection 0.5 mL, 0.5 mL, IntraMUSCular, PRIOR TO DISCHARGE, Tiara Altman MD 
  hydrALAZINE (APRESOLINE) 20 mg/mL injection 20 mg, 20 mg, IntraVENous, Q6H PRN, Shana Sims NP, 20 mg at 12/10/19 0541 
  dextrose 10 % infusion 125-250 mL, 125-250 mL, IntraVENous, PRN, Angelito Vasquez MD, Stopped at 11/18/19 0700 A/P 
  
S/P LVAD - good flows Need for anti-coagulation - coumadin DM - insulin RV dysfunction - milrinone, diuretics  
  
Risk of morbidity and mortality - high Medical decision making - high complexity Signed By: Kary Ramos MD

## 2019-12-16 NOTE — PROGRESS NOTES
0700: patient had an uneventful shift: no epataxis;vital signs WDL; PI noted >2 occasionally; he slept with CPAP on for approximately 6 hours. Gross hematuria 
 
0730: Bedside and Verbal shift change report given to Lynn Andino  (oncoming nurse) by Maricel Manning (offgoing nurse). Report included the following information SBAR, Kardex, OR Summary, Intake/Output, MAR, Recent Results and Cardiac Rhythm Paced. Problem: Falls - Risk of 
Goal: *Absence of Falls Description Document Ashlie Colón Fall Risk and appropriate interventions in the flowsheet. Outcome: Progressing Towards Goal 
Note:  
Fall Risk Interventions: 
Mobility Interventions: Assess mobility with egress test, OT consult for ADLs, Patient to call before getting OOB, PT Consult for mobility concerns, Strengthening exercises (ROM-active/passive) Mentation Interventions: Door open when patient unattended, Evaluate medications/consider consulting pharmacy, Eyeglasses and hearing aids, Familiar objects from home, Gait belt with transfers/ambulation, Increase mobility, More frequent rounding, Reorient patient, Room close to nurse's station, Update white board Medication Interventions: Evaluate medications/consider consulting pharmacy, Patient to call before getting OOB, Teach patient to arise slowly Elimination Interventions: Call light in reach, Patient to call for help with toileting needs, Toileting schedule/hourly rounds History of Falls Interventions: Bed/chair exit alarm, Consult care management for discharge planning, Door open when patient unattended, Evaluate medications/consider consulting pharmacy, Room close to nurse's station Call bell and personal effects within reach. Bed locked and in low position. Non skid footwear in place.; 
  
Problem: Pressure Injury - Risk of 
Goal: *Prevention of pressure injury Description Document Mich Scale and appropriate interventions in the flowsheet.  
Outcome: Progressing Towards Goal 
 Note: Pressure Injury Interventions: 
Sensory Interventions: Assess changes in LOC, Assess need for specialty bed, Check visual cues for pain, Discuss PT/OT consult with provider, Keep linens dry and wrinkle-free, Maintain/enhance activity level, Pressure redistribution bed/mattress (bed type), Turn and reposition approx. every two hours (pillows and wedges if needed) Moisture Interventions: Internal/External urinary devices, Maintain skin hydration (lotion/cream), Moisture barrier, Apply protective barrier, creams and emollients, Check for incontinence Q2 hours and as needed Activity Interventions: Increase time out of bed, Pressure redistribution bed/mattress(bed type), PT/OT evaluation Mobility Interventions: Float heels, HOB 30 degrees or less, Pressure redistribution bed/mattress (bed type), PT/OT evaluation, Turn and reposition approx. every two hours(pillow and wedges) Nutrition Interventions: Document food/fluid/supplement intake Friction and Shear Interventions: HOB 30 degrees or less, Lift sheet Problem: LVAD Post-op Day 15 to Discharge Goal: Diagnostic Test/Procedures Outcome: Progressing Towards Goal 
Goal: Medications Outcome: Progressing Towards Goal 
Goal: Respiratory Outcome: Progressing Towards Goal 
Goal: Psychosocial 
Outcome: Progressing Towards Goal

## 2019-12-16 NOTE — PROGRESS NOTES
Chart reviewed. RN reported that she is educating patient/wife on dressing change for driveline at this time. Will follow up for PT intervention as able. Thank you.

## 2019-12-16 NOTE — PROGRESS NOTES
[facility] 
 [address] [facility], [address] [facility] Phone: [phone]   [phone]   
  
Nephrology Progress Note Sona Kiser     1950     [id number] Date of Admission : 10/25/2019 
12/16/19 CC:  Follow up for JUAN J, CKD, Hyponatremia Assessment and Plan JUAN J on CKD: 
- likely ATN +/- overdiuresis - Cr stable w/ IV Bumex  
- Thomas Hematuria now and Hb dropping ==> re-consult urology -- may need bladder cauterization Gross hematuria, BPH w/ retention: 
- off CBI pre VAD. cysto pre op showed catheter related Cystitis @ postr wall  
- neal had to be replaced due to urinary retention - INR 2.2  
- may need cauterization Hypokalemia  
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  
- unable to get CTA/MRA 
- per neuro - On [facility] Acute on Chronic HFrEF  
- EF 16-20%, NYHA Class IV , hx of VF arrest - s/p AICD 
- Impella insertion 10/29- removed 11/18  
- s/p LVAD placement on 11/18 
- s/p right thoracentesis. CT in place Hoarseness, vocal cord paralysis, mediastinal adenopathy: 
- will need eventual PET/CT 
  
L arm clot s/p thrombectomy on 11/25 JOSE on CPAP  
  
PAF s/p DCCV 6/19 
  
Anemia: 
- from blood loss s/p multiple blood products - s/p IV iron,  Repeat iron studies ok 
- on PAVEL q7 d Serratia and Enteroccocus UTI: 
- completed abx Interval History:   
Seen and examined. Gross hematuria Intermittent confusion Not jerking Wife at bedside Review of Systems: as per HPI Current Medications:  
Current Facility-Administered Medications Medication Dose Route Frequency  potassium chloride SR (KLOR-CON 10) tablet 20 mEq  20 mEq Oral DAILY  warfarin (COUMADIN) tablet 3 mg  3 mg Oral ONCE  
 albuterol-ipratropium (DUO-NEB) 2.5 MG-0.5 MG/3 ML  3 mL Nebulization Q6H RT  
 oxymetazoline (AFRIN) 0.05 % nasal spray 2 Spray  2 Spray Both Nostrils BID  
  spironolactone (ALDACTONE) tablet 25 mg  25 mg Oral DAILY  clonazePAM (KlonoPIN) tablet 0.5 mg  0.5 mg Oral TID PRN  
 milrinone (PRIMACOR) 20 MG/100 ML D5W infusion  0.3 mcg/kg/min IntraVENous CONTINUOUS  
 sildenafil (pulm.hypertension) (REVATIO) tablet 40 mg  40 mg Oral TID  bumetanide (BUMEX) injection 2 mg  2 mg IntraVENous BID  magnesium oxide (MAG-OX) tablet 400 mg  400 mg Oral BID  diphenhydrAMINE (BENADRYL) capsule 25 mg  25 mg Oral QHS PRN  
 octreotide (SANDOSTATIN) injection 25 mcg  25 mcg SubCUTAneous TID  benzocaine-menthol (CEPACOL) lozenge 1 Lozenge  1 Lozenge Mucous Membrane PRN  
 cefepime (MAXIPIME) 2 g in 0.9% sodium chloride (MBP/ADV) 100 mL  2 g IntraVENous Q24H  
 digoxin (LANOXIN) tablet 0.0625 mg  62.5 mcg Oral Q MON, WED & FRI  levETIRAcetam (KEPPRA) oral solution 250 mg  250 mg Oral Q12H  pantoprazole (PROTONIX) tablet 40 mg  40 mg Oral ACB  aspirin chewable tablet 81 mg  81 mg Oral DAILY  0.9% sodium chloride infusion  3 mL/hr IntraVENous CONTINUOUS  
 allopurinol (ZYLOPRIM) tablet 100 mg  100 mg Oral DAILY  arformoterol (BROVANA) neb solution 15 mcg  15 mcg Nebulization BID RT And  
 budesonide (PULMICORT) 500 mcg/2 ml nebulizer suspension  500 mcg Nebulization BID RT  
 artificial saliva (MOUTH KOTE) 1 Spray  1 Spray Oral PRN  
 lactobac ac& pc-s.therm-b.anim (TIAN Q/RISAQUAD)  1 Cap Oral DAILY  oxyCODONE IR (ROXICODONE) tablet 5 mg  5 mg Oral Q6H PRN  
 balsam peru-castor oil (VENELEX) ointment   Topical TID  tamsulosin (FLOMAX) capsule 0.4 mg  0.4 mg Oral DAILY  insulin lispro (HUMALOG) injection   SubCUTAneous AC&HS  Warfarin MD/NP dosing   Other PRN  
 epoetin yvonne-epbx (RETACRIT) injection 20,000 Units  20,000 Units SubCUTAneous Q7D  
 sodium chloride (NS) flush 5-40 mL  5-40 mL IntraVENous Q8H  
 sodium chloride (NS) flush 5-40 mL  5-40 mL IntraVENous PRN  
 acetaminophen (TYLENOL) tablet 650 mg  650 mg Oral Q6H PRN  
  naloxone (NARCAN) injection 0.4 mg  0.4 mg IntraVENous PRN  
 ondansetron (ZOFRAN) injection 4 mg  4 mg IntraVENous Q4H PRN  
 senna-docusate (PERICOLACE) 8.6-50 mg per tablet 1 Tab  1 Tab Oral DAILY  bisacodyl (DULCOLAX) tablet 5 mg  5 mg Oral DAILY PRN  
 sucralfate (CARAFATE) tablet 1 g  1 g Oral AC&HS  influenza vaccine 2019-20 (6 mos+)(PF) (FLUARIX/FLULAVAL/FLUZONE QUAD) injection 0.5 mL  0.5 mL IntraMUSCular PRIOR TO DISCHARGE  hydrALAZINE (APRESOLINE) 20 mg/mL injection 20 mg  20 mg IntraVENous Q6H PRN  
 dextrose 10 % infusion 125-250 mL  125-250 mL IntraVENous PRN No Known Allergies Objective: 
Vitals:   
Vitals:  
 12/16/19 9186 12/16/19 0804 12/16/19 0947 12/16/19 1112 BP:      
Pulse:  76  87 Resp:  18  16 Temp:  97 °F (36.1 °C)  97.1 °F (36.2 °C) SpO2:  96% 99% 97% Weight: 83.1 kg (183 lb 3.2 oz) Height:      
 
Intake and Output: 
12/16 0701 - 12/16 1900 In: 400 [P.O.:400] Out: 700 [Urine:700] 12/14 1901 - 12/16 0700 In: 1581.8 [P.O.:1160; I.V.:261.8] Out: 4400 [Urine:4200] Physical Examination: 
 
General: Elderly man in no acute distress HEENT:            Ng in place; very dry mucous membranes Neck:  Lines + Resp:  Decreased bibasilar breath sounds; no resp distress CV:  VAD sounds;  1-2+ LE edema Chest:             Chest tube in place GI:  Soft and non-tender; no distension Neurologic:  Alert and appropriate; normal speech :  + neal; gross hematuria [x]    High complexity decision making was performed 
[x]    Patient is at high-risk of decompensation with multiple organ involvement Lab Data Personally Reviewed: I have reviewed all the pertinent labs, microbiology data and radiology studies during assessment. Recent Labs 12/16/19 
0410 12/15/19 
4910 12/14/19 
9057  138 140  
K 3.5 3.4* 3.8 CL 99 100 104 CO2 34* 31 30 * 233* 163* BUN 43* 40* 38* CREA 2.15* 2.21* 2.31* CA 8.8 8.6 9.0 MG 2.0 1.8 2.1 ALB 2.3* 2.3* 2.5* SGOT 14* 12* 14* ALT 10* 11* 12 INR 2.2* 1.8* 1.6* Recent Labs 12/16/19 
0410 12/15/19 
1537 12/15/19 
0332 12/14/19 
7985 WBC 4.0*  --  5.2 4.7 HGB 6.9* 7.8* 7.8* 7.8* HCT 22.3* 25.4* 25.8* 26.1*  
  --  175 162 No results found for: SDES Lab Results Component Value Date/Time Culture result: NO GROWTH AFTER 18 HOURS 12/15/2019 03:37 PM  
 Culture result: NO GROWTH 5 DAYS 12/06/2019 11:23 PM  
 Culture result: HEAVY ENTEROCOCCUS SPECIES NOTED (A) 11/27/2019 11:10 AM  
 Culture result: LIGHT YEAST (A) 11/27/2019 11:10 AM  
 Culture result: NO GROWTH 5 DAYS 11/12/2019 11:18 AM  
 
Recent Results (from the past 24 hour(s)) CULTURE, BLOOD, PAIRED Collection Time: 12/15/19  3:37 PM  
Result Value Ref Range Special Requests: NO SPECIAL REQUESTS Culture result: NO GROWTH AFTER 18 HOURS    
HGB & HCT Collection Time: 12/15/19  3:37 PM  
Result Value Ref Range HGB 7.8 (L) 12.1 - 17.0 g/dL HCT 25.4 (L) 36.6 - 50.3 % GLUCOSE, POC Collection Time: 12/15/19  4:30 PM  
Result Value Ref Range Glucose (POC) 138 (H) 65 - 100 mg/dL Performed by Mynor Lamb GLUCOSE, POC Collection Time: 12/15/19 10:04 PM  
Result Value Ref Range Glucose (POC) 126 (H) 65 - 100 mg/dL Performed by Kala Helm METABOLIC PANEL, COMPREHENSIVE Collection Time: 12/16/19  4:10 AM  
Result Value Ref Range Sodium 138 136 - 145 mmol/L Potassium 3.5 3.5 - 5.1 mmol/L Chloride 99 97 - 108 mmol/L  
 CO2 34 (H) 21 - 32 mmol/L Anion gap 5 5 - 15 mmol/L Glucose 103 (H) 65 - 100 mg/dL BUN 43 (H) 6 - 20 MG/DL Creatinine 2.15 (H) 0.70 - 1.30 MG/DL  
 BUN/Creatinine ratio 20 12 - 20 GFR est AA 37 (L) >60 ml/min/1.73m2 GFR est non-AA 31 (L) >60 ml/min/1.73m2 Calcium 8.8 8.5 - 10.1 MG/DL Bilirubin, total 0.6 0.2 - 1.0 MG/DL  
 ALT (SGPT) 10 (L) 12 - 78 U/L  
 AST (SGOT) 14 (L) 15 - 37 U/L Alk.  phosphatase 87 45 - 117 U/L  
 Protein, total 6.1 (L) 6.4 - 8.2 g/dL Albumin 2.3 (L) 3.5 - 5.0 g/dL Globulin 3.8 2.0 - 4.0 g/dL A-G Ratio 0.6 (L) 1.1 - 2.2 MAGNESIUM Collection Time: 12/16/19  4:10 AM  
Result Value Ref Range Magnesium 2.0 1.6 - 2.4 mg/dL CBC W/O DIFF Collection Time: 12/16/19  4:10 AM  
Result Value Ref Range WBC 4.0 (L) 4.1 - 11.1 K/uL  
 RBC 2.35 (L) 4.10 - 5.70 M/uL HGB 6.9 (L) 12.1 - 17.0 g/dL HCT 22.3 (L) 36.6 - 50.3 % MCV 94.9 80.0 - 99.0 FL  
 MCH 29.4 26.0 - 34.0 PG  
 MCHC 30.9 30.0 - 36.5 g/dL  
 RDW 17.2 (H) 11.5 - 14.5 % PLATELET 766 881 - 662 K/uL MPV 9.8 8.9 - 12.9 FL  
 NRBC 0.0 0  WBC ABSOLUTE NRBC 0.00 0.00 - 0.01 K/uL PROTHROMBIN TIME + INR Collection Time: 12/16/19  4:10 AM  
Result Value Ref Range INR 2.2 (H) 0.9 - 1.1 Prothrombin time 21.5 (H) 9.0 - 11.1 sec DIGOXIN Collection Time: 12/16/19  4:10 AM  
Result Value Ref Range Digoxin level 0.9 0.90 - 2.00 NG/ML  
LACTIC ACID Collection Time: 12/16/19  4:10 AM  
Result Value Ref Range Lactic acid 0.8 0.4 - 2.0 MMOL/L  
PROCALCITONIN Collection Time: 12/16/19  4:10 AM  
Result Value Ref Range Procalcitonin 0.10 ng/mL TYPE & SCREEN Collection Time: 12/16/19  4:10 AM  
Result Value Ref Range Crossmatch Expiration 12/19/2019 ABO/Rh(D) O POSITIVE Antibody screen NEG Comment Previously identified Nonspecific Antibody and Nonspecific Cold Antibody ROYER Poly POS   
 ROYER IgG POS   
 ROYER C3b/C3d NEG Unit number [id number] Blood component type RC LR,1 Unit division 00 Status of unit ALLOCATED Crossmatch result Compatible Unit number [id number] Blood component type RC LR,1 Unit division 00 Status of unit ALLOCATED Crossmatch result Compatible LD Collection Time: 12/16/19  4:10 AM  
Result Value Ref Range  85 - 241 U/L  
NT-PRO BNP Collection Time: 12/16/19  4:10 AM  
Result Value Ref Range NT pro-BNP 7,640 (H) <125 PG/ML PREALBUMIN Collection Time: 12/16/19  4:10 AM  
Result Value Ref Range Prealbumin 13.6 (L) 20.0 - 40.0 mg/dL PTT Collection Time: 12/16/19  4:14 AM  
Result Value Ref Range aPTT 35.6 (H) 22.1 - 32.0 sec  
 aPTT, therapeutic range     58.0 - 77.0 SECS  
GLUCOSE, POC Collection Time: 12/16/19  7:07 AM  
Result Value Ref Range Glucose (POC) 119 (H) 65 - 100 mg/dL Performed by Sully Polanco GLUCOSE, POC Collection Time: 12/16/19 11:11 AM  
Result Value Ref Range Glucose (POC) 148 (H) 65 - 100 mg/dL Performed by Genevieve Liriano MD

## 2019-12-17 NOTE — DIABETES MGMT
[facility] Cardiac Surgery Note Follow Up Recommendations/ Comments: BG's remain < 140 mg/dL with the exception of one result pre lunch Pt is s/p LVAD placement 11/18/2019. Had brachial thrombectomy 11/26/19. Pt returned to CVICU 12/4/2019 due to respiratory distress. PO intake varies - overall improving Please add carb consistent to current diet order Current hospital DM medication: lispro correction scale, normal sensitivity Chart reviewed and initial evaluation complete on Sona Kiser. Patient is a 71 y.o. male with no prior hx. Recent A1c suggestive of new dx. DTC saw pt for education regarding new diagnosis on 11/27/2019. May need review closer to discharge A1c:  
Lab Results Component Value Date/Time Hemoglobin A1c 6.6 (H) 10/25/2019 02:56 PM  
              
Recent Glucose Results:  
Lab Results Component Value Date/Time  (H) 12/17/2019 04:16 AM  
 GLUCPOC 173 (H) 12/17/2019 11:20 AM  
 GLUCPOC 137 (H) 12/17/2019 06:48 AM  
 GLUCPOC 147 (H) 12/16/2019 09:14 PM  
  
 
Lab Results Component Value Date/Time Creatinine 2.13 (H) 12/17/2019 04:16 AM  
 
Estimated Creatinine Clearance: 38.1 mL/min (A) (based on SCr of 2.13 mg/dL (H)). Active Orders Diet DIET REGULAR 2 GM NA (House Low NA); 2 [place]/2 Mildly Thick PO intake:  
Patient Vitals for the past 72 hrs: 
 % Diet Eaten 12/16/19 1526 100 % 12/16/19 1112 75 % 12/16/19 0804 50 % 12/15/19 1507 0 % 12/15/19 1232 50 % 12/15/19 1110 10 % 12/15/19 0948 10 % 12/15/19 0815 0 % 12/14/19 1738 50 % 12/14/19 1512 75 % Will continue to follow as needed. Thank you. Olive Santiago RN, CDE [facility] [place]

## 2019-12-17 NOTE — PROGRESS NOTES
[facility] VAD/Heart Failure Progress Note Admit Date: 10/25/2019 POD:  22 Days Post-Op Procedure:  Procedure(s): LEFT BRACHIAL THROMBECTOMY Subjective:  
Mild dyspnea, fatigue, and weakness; hematuria Objective:  
Vitals: 
Blood pressure 91/72, pulse 85, temperature 98.1 °F (36.7 °C), resp. rate 21, height 6' 2\" (1.88 m), weight 186 lb 11.7 oz (84.7 kg), SpO2 99 %. Temp (24hrs), Av.2 °F (36.8 °C), Min:97.9 °F (36.6 °C), Max:98.5 °F (36.9 °C) Hemodynamics: 
 CO: CO (l/min): 5.8 l/min CI: CI (l/min/m2): 2.8 l/min/m2 CVP: CVP (mmHg): 4 mmHg (19 1645) SVR: SVR (dyne*sec)/cm5: 1080 (dyne*sec)/cm5 (19 1200) PAP Systolic: PAP Systolic: 34 (80/52/34 7981) PAP Diastolic: PAP Diastolic: 13 (15/56/23 5731) PVR:   
 SV02: SVO2 (%): 67 % (19 1300) SCV02: SCVO2 (%): 75 % (10/29/19 1900) VAD Interrogation: LVAD (Heartmate) Pump Speed (RPM): 6145 Pump Flow (LPM): 5 
PI (Pulsitility Index): 3.4 Power: 4.5 MAP: 89  Test: No 
Back Up  at Bedside & Labeled: Yes Power Module Test: No 
Driveline Site Care: No 
Driveline Dressing: Clean, Dry, and Intact EKG/Rhythm:   
 
Extubation Date / Time:  
 
CT Output:  
 
Ventilator: 
Ventilator Volumes Vt Set (ml): 550 ml (19 08) Vt Exhaled (Machine Breath) (ml): 639 ml (19 08) Vt Spont (ml): 437 ml (19 1035) Ve Observed (l/min): 7.25 l/min (19 1035) Oxygen Therapy: 
Oxygen Therapy O2 Sat (%): 99 % (19 1707) Pulse via Oximetry: 100 beats per minute (19 142) O2 Device: Nasal cannula (19) O2 Flow Rate (L/min): 5 l/min (19) FIO2 (%): 21 % (19 1721) CXR: 
 
Admission Weight: Last Weight Weight: 192 lb 10.9 oz (87.4 kg) Weight: 186 lb 11.7 oz (84.7 kg) Intake / Output / Drain: 
Current Shift: 701 - 1900 In: 75606.6 [P.O.:500] Out: 68689 Last 24 hrs.:  
 
 Intake/Output Summary (Last 24 hours) at 12/17/2019 1713 Last data filed at 12/17/2019 9079 Gross per 24 hour Intake 11912.6 ml Output 84399 ml Net -3805.4 ml No results for input(s): CPK, CKMB, TROIQ in the last 72 hours. Recent Labs 12/17/19 
1102 12/17/19 
0416 12/16/19 
0410  12/15/19 
0940 NA  --  136 138  --  138 K  --  3.6 3.5  --  3.4*  
CO2  --  33* 34*  --  31 BUN  --  44* 43*  --  40* CREA  --  2.13* 2.15*  --  2.21* GLU  --  117* 103*  --  233* MG  --  2.1 2.0  --  1.8 WBC  --  4.7 4.0*  --  5.2 HGB 6.2* 5.8* 6.9*   < > 7.8* HCT 20.3* 18.8* 22.3*   < > 25.8* PLT  --  171 167  --  175  
 < > = values in this interval not displayed. Recent Labs 12/17/19 
4660 12/16/19 
9934 12/16/19 
0410 12/15/19 
0159 INR 2.9*  --  2.2* 1.8* PTP 28.2*  --  21.5* 17.5* APTT 41.4* 35.6*  --  31.9 No lab exists for component: PBNP Current Facility-Administered Medications:  
  alteplase (CATHFLO) 1 mg in sterile water (preservative free) 1 mL injection, 1 mg, InterCATHeter, PRN, Mounika Altman MD, 2 mg at 12/17/19 0600 
  0.9% sodium chloride infusion 250 mL, 250 mL, IntraVENous, PRN, Odilon Herrera NP 
[facility]  [START ON 12/18/2019] finasteride (PROSCAR) tablet 5 mg, 5 mg, Oral, DAILY, Shauna Dover MD 
  dronabinol (MARINOL) capsule 2.5 mg, 2.5 mg, Oral, DAILY, Shana Sims NP, 2.5 mg at 12/17/19 1322 
  0.9% sodium chloride infusion 250 mL, 250 mL, IntraVENous, PRN, Levi, Shana B, NP 
  bumetanide (BUMEX) 0.25 mg/mL injection, , , ,  
  potassium chloride SR (KLOR-CON 10) tablet 20 mEq, 20 mEq, Oral, DAILY, Levi, Erin B, NP, 20 mEq at 12/17/19 0813 
  albuterol-ipratropium (DUO-NEB) 2.5 MG-0.5 MG/3 ML, 3 mL, Nebulization, Q6H RT, TinoerClaire MD, 3 mL at 12/17/19 3177 
  oxymetazoline (AFRIN) 0.05 % nasal spray 2 Spray, 2 Spray, Both Nostrils, BID, Loi Altman MD, 2 Spray at 12/17/19 8387   spironolactone (ALDACTONE) tablet 25 mg, 25 mg, Oral, DAILY, Mounika Altman MD, 25 mg at 12/17/19 1995   clonazePAM (KlonoPIN) tablet 0.5 mg, 0.5 mg, Oral, TID PRN, Mounika Toney MD, 0.5 mg at 12/17/19 1416 
  milrinone (PRIMACOR) 20 MG/100 ML D5W infusion, 0.3 mcg/kg/min, IntraVENous, CONTINUOUS, Mounika Altman MD, Last Rate: 7.5 mL/hr at 12/17/19 0825, 0.3 mcg/kg/min at 12/17/19 0825 
  sildenafil (pulm.hypertension) (REVATIO) tablet 40 mg, 40 mg, Oral, TID, Mounika Altman MD, 40 mg at 12/17/19 2316   bumetanide (BUMEX) injection 2 mg, 2 mg, IntraVENous, BID, Mounika Altman MD, 2 mg at 12/17/19 0813 
  magnesium oxide (MAG-OX) tablet 400 mg, 400 mg, Oral, BID, Elsa Herrera NP, 400 mg at 12/17/19 1562   diphenhydrAMINE (BENADRYL) capsule 25 mg, 25 mg, Oral, QHS PRN, Elsa Herrera, NP 
  octreotide (SANDOSTATIN) injection 25 mcg, 25 mcg, SubCUTAneous, TID, Alfonso Herrera NP, 25 mcg at 12/17/19 0825 
  benzocaine-menthol (CEPACOL) lozenge 1 Lozenge, 1 Lozenge, Mucous Membrane, PRN, Elsa Herrera, NP 
  cefepime (MAXIPIME) 2 g in 0.9% sodium chloride (MBP/ADV) 100 mL, 2 g, IntraVENous, Q24H, Alfonso Herrera NP, Last Rate: 200 mL/hr at 12/17/19 1321, 2 g at 12/17/19 1321   digoxin (LANOXIN) tablet 0.0625 mg, 62.5 mcg, Oral, Q MON, WED & FRI, Alfonso Herrera NP, 0.0625 mg at 12/16/19 2210   levETIRAcetam (KEPPRA) oral solution 250 mg, 250 mg, Oral, Q12H, Alfonso Herrera NP, 250 mg at 12/17/19 0721 
  pantoprazole (PROTONIX) tablet 40 mg, 40 mg, Oral, ACB, Alfonso Herrera NP, 40 mg at 12/17/19 4624   [Held by provider] aspirin chewable tablet 81 mg, 81 mg, Oral, DAILY, Kenyatta Andrews MD, 81 mg at 12/16/19 0843 
  0.9% sodium chloride infusion, 3 mL/hr, IntraVENous, CONTINUOUS, Alfonso Herrera NP, Last Rate: 5 mL/hr at 12/13/19 0800, 5 mL/hr at 12/13/19 0800 
  allopurinol (ZYLOPRIM) tablet 100 mg, 100 mg, Oral, DAILY, Sharon, Ciro Noel NP, 100 mg at 12/17/19 3640   arformoterol (BROVANA) neb solution 15 mcg, 15 mcg, Nebulization, BID RT, 15 mcg at 12/16/19 2213 **AND** budesonide (PULMICORT) 500 mcg/2 ml nebulizer suspension, 500 mcg, Nebulization, BID RT, Katelynnd, Fabricio LOPEZ, NP, 500 mcg at 12/17/19 0935 
  artificial saliva (MOUTH KOTE) 1 Spray, 1 Spray, Oral, PRN, Polliard, Ciro Noel, NP, 1 Spray at 12/12/19 1452   lactobac ac& pc-s.therm-b.anim (TIAN Q/RISAQUAD), 1 Cap, Oral, DAILY, Carmelo Velásquez MD, 1 Cap at 12/17/19 0813 
  oxyCODONE IR (ROXICODONE) tablet 5 mg, 5 mg, Oral, Q6H PRN, Freddie Javier MD, 5 mg at 12/17/19 1210 
  balsam peru-castor oil (VENELEX) ointment, , Topical, TID, FisAna Maria cruz MD 
  [facility]) capsule 0.4 mg, 0.4 mg, Oral, DAILY, Logan Kincaid MD, 0.4 mg at 12/17/19 0813 
  insulin lispro (HUMALOG) injection, , SubCUTAneous, AC&HS, Shana Sims NP, 2 Units at 12/17/19 1322   Warfarin MD/NP dosing, , Other, PRN, Mounika Altman MD 
  epoetin yvonne-epbx (RETACRIT) injection 20,000 Units, 20,000 Units, SubCUTAneous, Q7D, Logan Kincaid MD, 20,000 Units at 12/17/19 0721 
  sodium chloride (NS) flush 5-40 mL, 5-40 mL, IntraVENous, Q8H, Freddie Javier MD, 10 mL at 12/17/19 7563   sodium chloride (NS) flush 5-40 mL, 5-40 mL, IntraVENous, PRN, Freddie Javier MD, 40 mL at 12/17/19 0455   acetaminophen (TYLENOL) tablet 650 mg, 650 mg, Oral, Q6H PRN, Freddie Javier MD, 650 mg at 12/10/19 0105 
  naloxone [facility]) injection 0.4 mg, 0.4 mg, IntraVENous, PRN, Freddie Javier MD 
  ondansetron [facility]) injection 4 mg, 4 mg, IntraVENous, Q4H PRN, Freddie Javier MD, 4 mg at 12/15/19 1505   senna-docusate (PERICOLACE) 8.6-50 mg per tablet 1 Tab, 1 Tab, Oral, DAILY, Freddie Javier MD, 1 Tab at 12/17/19 4545   bisacodyl (DULCOLAX) tablet 5 mg, 5 mg, Oral, DAILY PRN, Freddie Javier MD 
   sucralfate (CARAFATE) tablet 1 g, 1 g, Oral, AC&HS, Kathie Bermudez MD, 1 g at 12/17/19 1322 
  influenza vaccine 2019-20 (6 mos+)(PF) (FLUARIX/FLULAVAL/FLUZONE QUAD) injection 0.5 mL, 0.5 mL, IntraMUSCular, PRIOR TO DISCHARGE, Rigoberto Altman MD 
  hydrALAZINE (APRESOLINE) 20 mg/mL injection 20 mg, 20 mg, IntraVENous, Q6H PRN, Shana Sims, NP, 20 mg at 12/10/19 0541 
  dextrose 10 % infusion 125-250 mL, 125-250 mL, IntraVENous, PRN, Lamin Del Valle MD, Stopped at 11/18/19 0700 A/P 
  
S/P LVAD - good flows Need for anti-coagulation - coumadin DM - insulin RV dysfunction - milrinone, diuretics  
  
Risk of morbidity and mortality - high Medical decision making - high complexity Signed By: Patrcik Flores MD

## 2019-12-17 NOTE — PROGRESS NOTES
ID Progress Note 2019 Subjective: Out of the ICU, now with hematuria and a nosebleed Objective: Antibiotics: 1. Levaquin 2. Rifampin 3. Fluconazole 4. Vancomycin 5. Cefazolin 6. Zosyn Vitals:  
Visit Vitals BP 91/72 (BP 1 Location: Left arm, BP Patient Position: At rest) Pulse (!) 101 Temp 98.1 °F (36.7 °C) Resp 25 Ht 6' 2\" (1.88 m) Wt 84.7 kg (186 lb 11.7 oz) SpO2 100% BMI 23.97 kg/m² Tmax:  Temp (24hrs), Av.3 °F (36.8 °C), Min:98.1 °F (36.7 °C), Max:98.5 °F (36.9 °C) Exam:  Lungs:  clear to auscultation bilaterally Heart:  regular rate and rhythm Abdomen:  soft, non-tender. Bowel sounds normal. No masses,  no organomegaly Labs:     
Recent Labs 19 
1102 19 
0416 19 
0410 12/15/19 
1537 12/15/19 
3491 WBC  --  4.7 4.0*  --  5.2 HGB 6.2* 5.8* 6.9* 7.8* 7.8* PLT  --  171 167  --  175 BUN  --  44* 43*  --  40* CREA  --  2.13* 2.15*  --  2.21* SGOT  --  14* 14*  --  12* AP  --  84 87  --  96  
TBILI  --  0.4 0.6  --  0.5 Cultures: No results found for: [facility] Lab Results Component Value Date/Time Culture result: NO GROWTH 2 DAYS 12/15/2019 03:37 PM  
 Culture result: NO GROWTH 1 DAY 12/15/2019 03:30 PM  
 Culture result: NO GROWTH 5 DAYS 2019 11:23 PM  
 
 
Radiology:  
 
Line/Insert Date:        
 
Assessment:  
 
1. UTI--Serratia (2 biotypes) from culture and small amount of Enterococcus 2. CHF/cardiomyopathy 3. ?aspiration event(s) 4. Renal insufficiency Objective: 1. Continue current therapy Lewis Padilla MD

## 2019-12-17 NOTE — CONSULTS
[facility] 
[address] [facility], [address] GI PROGRESS NOTE Will Flavia Sargent, [address] [facility] office [phone] NP/PA in-hospital cell phone M-F until 4:30PM 
After 5PM or on weekends, please call  for physician on call NAME: Anya Bingham :  1950 MRN:  [id number] Subjective:  
Patient was seen earlier during this admission for LVAD evaluation/iron deficiency anemia. EGD (19) by Dr. Mukesh Lewis for iron deficiency anemia was normal.  Colonoscopy (19) by Dr. Mukesh Lewis for iron deficiency anemia showed moderate diverticulosis in the descending and sigmoid colon; otherwise, normal.  A repeat colonoscopy was recommended in 5 years. Patient underwent LVAD placement on 19. He is on Coumadin. Patient reportedly had a significant nosebleed over the weekend and has gross hematuria. ENT evaluated and urology is following for hematuria. Denies nausea, reflux, vomiting, or abdominal pain. No hematochezia or melena. Discussed with RN at the bedside. Objective: VITALS:  
Last 24hrs VS reviewed since prior progress note. Most recent are: 
Visit Vitals BP 91/72 (BP 1 Location: Left arm, BP Patient Position: At rest) Pulse 85 Temp 98.5 °F (36.9 °C) Resp 22 Ht 6' 2\" (1.88 m) Wt 84.7 kg (186 lb 11.7 oz) SpO2 100% BMI 23.97 kg/m² PHYSICAL EXAM: 
General: Cooperative, no acute distress   
Neurologic:  Alert HEENT: EOMI, no scleral icterus Lungs:  Diminished bilaterally anteriorly Heart:  S1 S2, + LVAD hum Abdomen: Soft, non-distended, suprapubic tenderness, no guarding, no rebound. +Bowel sounds. Lizama with gross hematuria. Extremities: Warm Psych:   Not anxious or agitated Lab Data Reviewed:  
 
Recent Results (from the past 24 hour(s)) GLUCOSE, POC Collection Time: 19  4:18 PM  
Result Value Ref Range Glucose (POC) 113 (H) 65 - 100 mg/dL  Performed by Napoleon Kanner, POC  
 Collection Time: 12/16/19  9:14 PM  
Result Value Ref Range Glucose (POC) 147 (H) 65 - 100 mg/dL Performed by Ayanna Rodriguez METABOLIC PANEL, COMPREHENSIVE Collection Time: 12/17/19  4:16 AM  
Result Value Ref Range Sodium 136 136 - 145 mmol/L Potassium 3.6 3.5 - 5.1 mmol/L Chloride 98 97 - 108 mmol/L  
 CO2 33 (H) 21 - 32 mmol/L Anion gap 5 5 - 15 mmol/L Glucose 117 (H) 65 - 100 mg/dL BUN 44 (H) 6 - 20 MG/DL Creatinine 2.13 (H) 0.70 - 1.30 MG/DL  
 BUN/Creatinine ratio 21 (H) 12 - 20 GFR est AA 38 (L) >60 ml/min/1.73m2 GFR est non-AA 31 (L) >60 ml/min/1.73m2 Calcium 8.7 8.5 - 10.1 MG/DL Bilirubin, total 0.4 0.2 - 1.0 MG/DL  
 ALT (SGPT) 12 12 - 78 U/L  
 AST (SGOT) 14 (L) 15 - 37 U/L Alk. phosphatase 84 45 - 117 U/L Protein, total 6.0 (L) 6.4 - 8.2 g/dL Albumin 2.2 (L) 3.5 - 5.0 g/dL Globulin 3.8 2.0 - 4.0 g/dL A-G Ratio 0.6 (L) 1.1 - 2.2 MAGNESIUM Collection Time: 12/17/19  4:16 AM  
Result Value Ref Range Magnesium 2.1 1.6 - 2.4 mg/dL CBC W/O DIFF Collection Time: 12/17/19  4:16 AM  
Result Value Ref Range WBC 4.7 4.1 - 11.1 K/uL  
 RBC 1.98 (L) 4.10 - 5.70 M/uL HGB 5.8 (LL) 12.1 - 17.0 g/dL HCT 18.8 (L) 36.6 - 50.3 % MCV 94.9 80.0 - 99.0 FL  
 MCH 29.3 26.0 - 34.0 PG  
 MCHC 30.9 30.0 - 36.5 g/dL  
 RDW 17.2 (H) 11.5 - 14.5 % PLATELET 384 471 - 680 K/uL MPV 9.9 8.9 - 12.9 FL  
 NRBC 0.0 0  WBC ABSOLUTE NRBC 0.00 0.00 - 0.01 K/uL PROTHROMBIN TIME + INR Collection Time: 12/17/19  4:16 AM  
Result Value Ref Range INR 2.9 (H) 0.9 - 1.1 Prothrombin time 28.2 (H) 9.0 - 11.1 sec PTT Collection Time: 12/17/19  4:16 AM  
Result Value Ref Range aPTT 41.4 (H) 22.1 - 32.0 sec  
 aPTT, therapeutic range     58.0 - 77.0 SECS  
DIGOXIN Collection Time: 12/17/19  4:16 AM  
Result Value Ref Range Digoxin level 0.9 0.90 - 2.00 NG/ML  
LACTIC ACID  Collection Time: 12/17/19  4:16 AM  
 Result Value Ref Range Lactic acid 1.1 0.4 - 2.0 MMOL/L  
PROCALCITONIN Collection Time: 12/17/19  4:16 AM  
Result Value Ref Range Procalcitonin 0.10 ng/mL LD Collection Time: 12/17/19  4:16 AM  
Result Value Ref Range  85 - 241 U/L  
NT-PRO BNP Collection Time: 12/17/19  4:16 AM  
Result Value Ref Range NT pro-BNP 5,786 (H) <125 PG/ML  
OCCULT BLOOD, STOOL Collection Time: 12/17/19  4:25 AM  
Result Value Ref Range Occult blood, stool POSITIVE (A) NEG    
GLUCOSE, POC Collection Time: 12/17/19  6:48 AM  
Result Value Ref Range Glucose (POC) 137 (H) 65 - 100 mg/dL Performed by Pauly Steward HGB & HCT Collection Time: 12/17/19 11:02 AM  
Result Value Ref Range HGB 6.2 (L) 12.1 - 17.0 g/dL HCT 20.3 (L) 36.6 - 50.3 % GLUCOSE, POC Collection Time: 12/17/19 11:20 AM  
Result Value Ref Range Glucose (POC) 173 (H) 65 - 100 mg/dL Performed by Lu Hung Assessment: · Anemia: EGD (11/11/19) by Dr. Jose Francisco Altman for iron deficiency anemia was normal.  Colonoscopy (11/11/19) by Dr. Jose Francisco Altman for iron deficiency anemia showed moderate diverticulosis in the descending and sigmoid colon; otherwise, normal.  A repeat colonoscopy was recommended in 5 years. Hgb 5.8, platelets 517, INR 2.9. On Coumadin. Hemoccult positive 12/17, negative 12/14. Most recently, 1 unit PRBCs on 12/17. Recurrent gross hematuria - urology following. Right anterior epistaxis - ENT evaluated on 12/15. No signs of active GI bleeding. 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  
Diagnosis Code  Paroxysmal atrial fibrillation (HCC) I48.0  Acute on chronic systolic CHF (congestive heart failure) (HCC) I50.23  Systolic CHF, chronic (HCC) I50.22  
 Peripheral vascular disease (HCC) I73.9  Acute decompensated heart failure (HCC) I50.9 Plan:  
· On PPI · Monitor CBC and transfuse as necessary. Patient has had gross hematuria and epistaxis. No signs of active GI bleeding. Monitor for signs of active GI bleeding. · Patient was discussed with and will be seen by Dr. Echo Dyson Signed By: Bryan Boudreaux   
 12/17/2019  12:40 PM 
  
 
 
Gastroenterology Attending Physician attestation statement and comments. This patient was seen and examined by me in a face-to-face visit today. I reviewed the medical record including lab work, imaging and other provider notes. I confirmed the history as described above. I spoke to the patient, reviewed the medical record including lab work, imaging and other provider notes. I discussed this case in detail with Bryan Delarosa. I formulated an  assessment of this patient and developed a treatment plan. I agree with the above consultation note. I agree with the history, exam and assessment and plan as outlined in the note. I would like to add the following: Based on current presentation, it does not appear that he is having overt GI bleeding. Recent EGD and colonoscopy by Dr. Joe Jones were unremarkable. Next step to consider would be M2A capsule endoscopy, but it is not clear that this is indicated at this time. Dr. Joe Jones to follow tomorrow. Alfonso Dyson MD

## 2019-12-17 NOTE — PROGRESS NOTES
SLP Contact Note Attempted to see patient for dysphagia treatment. Per discussion with RN, pt without difficulty after advancing to regular diet today. However, pt has reportedly had a \"rough day\" and is finally resting. Therefore, SLP will defer treatment for now. Thank you, Marie Zelaya M.Ed, CCC-SLP Speech-Language Pathologist

## 2019-12-17 NOTE — PROGRESS NOTES
Chart reviewed and noted patient with critical value for Hgb of 5.8. NP ordered 1 unit PRBC and transfusion initiated at 0700 am. Patient still receiving transfusion at this time and per RN, patient required assist x 3 last attempt at standing. Will follow up for OT intervention this afternoon as able. Thank you.

## 2019-12-17 NOTE — PROGRESS NOTES
0600: Critical value for Hgb of 5.8 called in by Jenni. Notified Aislinn Solorzano NP who ordered 1 unit PRBC and check H&H post transfusion. Cathflo initiated in red and white ports of 3L PICC as for no ability to flush. 0630: Red lumen flushes and gives blood return 
 
0700: PRBC transfusion initiated 0730: Bedside and Verbal shift change report given to Lynn Andino (oncoming nurse) by Maricel Manning (offgoing nurse). Report included the following information SBAR, Kardex, Procedure Summary, Intake/Output, MAR and Cardiac Rhythm Paced. Problem: Falls - Risk of 
Goal: *Absence of Falls Description Document Ashlie Colón Fall Risk and appropriate interventions in the flowsheet. Outcome: Progressing Towards Goal 
Note:  
Fall Risk Interventions: 
Mobility Interventions: Communicate number of staff needed for ambulation/transfer, OT consult for ADLs, Patient to call before getting OOB, PT Consult for mobility concerns, Strengthening exercises (ROM-active/passive) Mentation Interventions: Door open when patient unattended, Gait belt with transfers/ambulation, More frequent rounding, Reorient patient Medication Interventions: Patient to call before getting OOB, Teach patient to arise slowly, Evaluate medications/consider consulting pharmacy, Utilize gait belt for transfers/ambulation Elimination Interventions: Call light in reach, Patient to call for help with toileting needs History of Falls Interventions: Door open when patient unattended Bedrest for generalized weakness. Call bell and personal effects within reach. Bed locked and in low position. Non skid footwear in place. Problem: Pressure Injury - Risk of 
Goal: *Prevention of pressure injury Description Document Mich Scale and appropriate interventions in the flowsheet.  
Outcome: Progressing Towards Goal 
Note: Pressure Injury Interventions: 
Sensory Interventions: Assess changes in LOC, Assess need for specialty bed, Check visual cues for pain, Discuss PT/OT consult with provider, Keep linens dry and wrinkle-free, Maintain/enhance activity level, Pressure redistribution bed/mattress (bed type), Turn and reposition approx. every two hours (pillows and wedges if needed) Moisture Interventions: Apply protective barrier, creams and emollients, Check for incontinence Q2 hours and as needed, Internal/External urinary devices, Maintain skin hydration (lotion/cream), Minimize layers, Moisture barrier Activity Interventions: Assess need for specialty bed, Increase time out of bed, PT/OT evaluation, Pressure redistribution bed/mattress(bed type) Mobility Interventions: Pressure redistribution bed/mattress (bed type), PT/OT evaluation, Turn and reposition approx. every two hours(pillow and wedges), Float heels Nutrition Interventions: Document food/fluid/supplement intake Friction and Shear Interventions: Lift sheet, Lift team/patient mobility team 
 
  
Problem: LVAD Post-op Day 15 to Discharge Goal: Diagnostic Test/Procedures Outcome: Progressing Towards Goal 
Goal: Medications Outcome: Progressing Towards Goal 
Goal: Respiratory Outcome: Progressing Towards Goal 
Goal: Treatments/Interventions/Procedures Outcome: Progressing Towards Goal 
Goal: Psychosocial 
Outcome: Progressing Towards Goal

## 2019-12-17 NOTE — PROGRESS NOTES
[facility] 
 [address] [facility], [facility] [address] [facility] Phone: [phone]   [phone]   
  
Nephrology Progress Note Sona Kiser     1950     [id number] Date of Admission : 10/25/2019 
12/17/19 CC:  Follow up for JUAN J, CKD, Hyponatremia Assessment and Plan JUAN J on CKD: 
- likely ATN +/- overdiuresis - Cr stable w/ IV Bumex  
- daily labs Gross hematuria, BPH w/ retention: 
- off CBI pre VAD. cysto pre op showed catheter related Cystitis @ postr wall  
- neal had to be replaced due to urinary retention 
- CBI per Urology  
- bladder fulguration if fails CBI Hypokalemia  
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  
- unable to get CTA/MRA 
- On Keppra Acute on Chronic HFrEF  
- EF 16-20%, NYHA Class IV , hx of VF arrest - s/p AICD 
- Impella insertion 10/29- removed 11/18  
- s/p LVAD placement on 11/18 
- s/p right thoracentesis. CT in place Hoarseness, vocal cord paralysis, mediastinal adenopathy: 
- will need eventual PET/CT 
  
L arm clot s/p thrombectomy on 11/25 JOSE on CPAP  
  
PAF s/p DCCV 6/19 
  
Anemia: 
- from blood loss s/p multiple blood products - s/p IV iron,  Repeat iron studies ok 
- on PAVEL q7 d Serratia and Enteroccocus UTI: 
- completed abx Interval History:   
Seen and examined Continues to have gross hematuria Hb down Cr stable No jerking. Confusion improving VAD flows increased to 6400 - LDH 200s Review of Systems: as per HPI Current Medications:  
Current Facility-Administered Medications Medication Dose Route Frequency  alteplase (CATHFLO) 1 mg in sterile water (preservative free) 1 mL injection  1 mg InterCATHeter PRN  
 0.9% sodium chloride infusion 250 mL  250 mL IntraVENous PRN  
 [START ON 12/18/2019] finasteride (PROSCAR) tablet 5 mg  5 mg Oral DAILY  potassium chloride SR (KLOR-CON 10) tablet 20 mEq  20 mEq Oral DAILY  albuterol-ipratropium (DUO-NEB) 2.5 MG-0.5 MG/3 ML  3 mL Nebulization Q6H RT  
 oxymetazoline (AFRIN) 0.05 % nasal spray 2 Spray  2 Spray Both Nostrils BID  spironolactone (ALDACTONE) tablet 25 mg  25 mg Oral DAILY  clonazePAM (KlonoPIN) tablet 0.5 mg  0.5 mg Oral TID PRN  
 milrinone (PRIMACOR) 20 MG/100 ML D5W infusion  0.3 mcg/kg/min IntraVENous CONTINUOUS  
 sildenafil (pulm.hypertension) (REVATIO) tablet 40 mg  40 mg Oral TID  bumetanide (BUMEX) injection 2 mg  2 mg IntraVENous BID  magnesium oxide (MAG-OX) tablet 400 mg  400 mg Oral BID  diphenhydrAMINE (BENADRYL) capsule 25 mg  25 mg Oral QHS PRN  
 octreotide (SANDOSTATIN) injection 25 mcg  25 mcg SubCUTAneous TID  benzocaine-menthol (CEPACOL) lozenge 1 Lozenge  1 Lozenge Mucous Membrane PRN  
 cefepime (MAXIPIME) 2 g in 0.9% sodium chloride (MBP/ADV) 100 mL  2 g IntraVENous Q24H  
 digoxin (LANOXIN) tablet 0.0625 mg  62.5 mcg Oral Q MON, WED & FRI  levETIRAcetam (KEPPRA) oral solution 250 mg  250 mg Oral Q12H  pantoprazole (PROTONIX) tablet 40 mg  40 mg Oral ACB  [Held by provider] aspirin chewable tablet 81 mg  81 mg Oral DAILY  0.9% sodium chloride infusion  3 mL/hr IntraVENous CONTINUOUS  
 allopurinol (ZYLOPRIM) tablet 100 mg  100 mg Oral DAILY  arformoterol (BROVANA) neb solution 15 mcg  15 mcg Nebulization BID RT And  
 budesonide (PULMICORT) 500 mcg/2 ml nebulizer suspension  500 mcg Nebulization BID RT  
 artificial saliva (MOUTH KOTE) 1 Spray  1 Spray Oral PRN  
 lactobac ac& pc-s.therm-b.anim (TIAN Q/RISAQUAD)  1 Cap Oral DAILY  oxyCODONE IR (ROXICODONE) tablet 5 mg  5 mg Oral Q6H PRN  
 balsam peru-castor oil (VENELEX) ointment   Topical TID  tamsulosin (FLOMAX) capsule 0.4 mg  0.4 mg Oral DAILY  insulin lispro (HUMALOG) injection   SubCUTAneous AC&HS  Warfarin MD/NP dosing   Other PRN  
 epoetin yvonne-epbx (RETACRIT) injection 20,000 Units  20,000 Units SubCUTAneous Q7D  
  sodium chloride (NS) flush 5-40 mL  5-40 mL IntraVENous Q8H  
 sodium chloride (NS) flush 5-40 mL  5-40 mL IntraVENous PRN  
 acetaminophen (TYLENOL) tablet 650 mg  650 mg Oral Q6H PRN  
 naloxone (NARCAN) injection 0.4 mg  0.4 mg IntraVENous PRN  
 ondansetron (ZOFRAN) injection 4 mg  4 mg IntraVENous Q4H PRN  
 senna-docusate (PERICOLACE) 8.6-50 mg per tablet 1 Tab  1 Tab Oral DAILY  bisacodyl (DULCOLAX) tablet 5 mg  5 mg Oral DAILY PRN  
 sucralfate (CARAFATE) tablet 1 g  1 g Oral AC&HS  influenza vaccine 2019-20 (6 mos+)(PF) (FLUARIX/FLULAVAL/FLUZONE QUAD) injection 0.5 mL  0.5 mL IntraMUSCular PRIOR TO DISCHARGE  hydrALAZINE (APRESOLINE) 20 mg/mL injection 20 mg  20 mg IntraVENous Q6H PRN  
 dextrose 10 % infusion 125-250 mL  125-250 mL IntraVENous PRN No Known Allergies Objective: 
Vitals:   
Vitals:  
 12/17/19 0817 12/17/19 0936 12/17/19 0953 12/17/19 1124 BP:      
Pulse: 84  85 85 Resp: 20  20 22 Temp:   98.5 °F (36.9 °C) 98.5 °F (36.9 °C) SpO2: 100% 95% 95% 100% Weight:      
Height:      
 
Intake and Output: 
12/17 0701 - 12/17 1900 In: 275.8 Out: -  
12/15 1901 - 12/17 0700 In: 1400 [P.O.:1400] Out: 4800 [Urine:4800] Physical Examination: 
 
General: Elderly man in no acute distress HEENT:            Ng in place; very dry mucous membranes Neck:  Lines + Resp:  Decreased bibasilar breath sounds; no resp distress CV:  VAD sounds;  1-2+ LE edema Chest:             Chest tube in place GI:  Soft and non-tender; no distension Neurologic:  Alert and appropriate; normal speech :  + neal; gross hematuria [x]    High complexity decision making was performed 
[x]    Patient is at high-risk of decompensation with multiple organ involvement Lab Data Personally Reviewed: I have reviewed all the pertinent labs, microbiology data and radiology studies during assessment. Recent Labs 12/17/19 
6291 12/16/19 
0410 12/15/19 
2866  138 138 K 3.6 3.5 3.4*  
CL 98 99 100 CO2 33* 34* 31  
* 103* 233* BUN 44* 43* 40* CREA 2.13* 2.15* 2.21* CA 8.7 8.8 8.6 MG 2.1 2.0 1.8 ALB 2.2* 2.3* 2.3*  
SGOT 14* 14* 12* ALT 12 10* 11* INR 2.9* 2.2* 1.8* Recent Labs 12/17/19 
1102 12/17/19 
0416 12/16/19 
0410 12/15/19 
1537 12/15/19 
9003 WBC  --  4.7 4.0*  --  5.2 HGB 6.2* 5.8* 6.9* 7.8* 7.8* HCT 20.3* 18.8* 22.3* 25.4* 25.8* PLT  --  171 167  --  175 No results found for: SDES Lab Results Component Value Date/Time Culture result: NO GROWTH 2 DAYS 12/15/2019 03:37 PM  
 Culture result: NO GROWTH 1 DAY 12/15/2019 03:30 PM  
 Culture result: NO GROWTH 5 DAYS 12/06/2019 11:23 PM  
 Culture result: HEAVY ENTEROCOCCUS SPECIES NOTED (A) 11/27/2019 11:10 AM  
 Culture result: LIGHT YEAST (A) 11/27/2019 11:10 AM  
 
Recent Results (from the past 24 hour(s)) ECHO ADULT COMPLETE Collection Time: 12/16/19 12:24 PM  
Result Value Ref Range Tapse 1.73 1.5 - 2.0 cm GLUCOSE, POC Collection Time: 12/16/19  4:18 PM  
Result Value Ref Range Glucose (POC) 113 (H) 65 - 100 mg/dL Performed by Aidan Ramirez, POC Collection Time: 12/16/19  9:14 PM  
Result Value Ref Range Glucose (POC) 147 (H) 65 - 100 mg/dL Performed by [facility] METABOLIC PANEL, COMPREHENSIVE Collection Time: 12/17/19  4:16 AM  
Result Value Ref Range Sodium 136 136 - 145 mmol/L Potassium 3.6 3.5 - 5.1 mmol/L Chloride 98 97 - 108 mmol/L  
 CO2 33 (H) 21 - 32 mmol/L Anion gap 5 5 - 15 mmol/L Glucose 117 (H) 65 - 100 mg/dL BUN 44 (H) 6 - 20 MG/DL Creatinine 2.13 (H) 0.70 - 1.30 MG/DL  
 BUN/Creatinine ratio 21 (H) 12 - 20 GFR est AA 38 (L) >60 ml/min/1.73m2 GFR est non-AA 31 (L) >60 ml/min/1.73m2 Calcium 8.7 8.5 - 10.1 MG/DL Bilirubin, total 0.4 0.2 - 1.0 MG/DL  
 ALT (SGPT) 12 12 - 78 U/L  
 AST (SGOT) 14 (L) 15 - 37 U/L Alk.  phosphatase 84 45 - 117 U/L  
 Protein, total 6.0 (L) 6.4 - 8.2 g/dL Albumin 2.2 (L) 3.5 - 5.0 g/dL Globulin 3.8 2.0 - 4.0 g/dL A-G Ratio 0.6 (L) 1.1 - 2.2 MAGNESIUM Collection Time: 12/17/19  4:16 AM  
Result Value Ref Range Magnesium 2.1 1.6 - 2.4 mg/dL CBC W/O DIFF Collection Time: 12/17/19  4:16 AM  
Result Value Ref Range WBC 4.7 4.1 - 11.1 K/uL  
 RBC 1.98 (L) 4.10 - 5.70 M/uL HGB 5.8 (LL) 12.1 - 17.0 g/dL HCT 18.8 (L) 36.6 - 50.3 % MCV 94.9 80.0 - 99.0 FL  
 MCH 29.3 26.0 - 34.0 PG  
 MCHC 30.9 30.0 - 36.5 g/dL  
 RDW 17.2 (H) 11.5 - 14.5 % PLATELET 008 965 - 668 K/uL MPV 9.9 8.9 - 12.9 FL  
 NRBC 0.0 0  WBC ABSOLUTE NRBC 0.00 0.00 - 0.01 K/uL PROTHROMBIN TIME + INR Collection Time: 12/17/19  4:16 AM  
Result Value Ref Range INR 2.9 (H) 0.9 - 1.1 Prothrombin time 28.2 (H) 9.0 - 11.1 sec PTT Collection Time: 12/17/19  4:16 AM  
Result Value Ref Range aPTT 41.4 (H) 22.1 - 32.0 sec  
 aPTT, therapeutic range     58.0 - 77.0 SECS  
DIGOXIN Collection Time: 12/17/19  4:16 AM  
Result Value Ref Range Digoxin level 0.9 0.90 - 2.00 NG/ML  
LACTIC ACID Collection Time: 12/17/19  4:16 AM  
Result Value Ref Range Lactic acid 1.1 0.4 - 2.0 MMOL/L  
PROCALCITONIN Collection Time: 12/17/19  4:16 AM  
Result Value Ref Range Procalcitonin 0.10 ng/mL LD Collection Time: 12/17/19  4:16 AM  
Result Value Ref Range  85 - 241 U/L  
NT-PRO BNP Collection Time: 12/17/19  4:16 AM  
Result Value Ref Range NT pro-BNP 5,786 (H) <125 PG/ML  
OCCULT BLOOD, STOOL Collection Time: 12/17/19  4:25 AM  
Result Value Ref Range Occult blood, stool POSITIVE (A) NEG    
GLUCOSE, POC Collection Time: 12/17/19  6:48 AM  
Result Value Ref Range Glucose (POC) 137 (H) 65 - 100 mg/dL Performed by Kala Helm HGB & HCT Collection Time: 12/17/19 11:02 AM  
Result Value Ref Range HGB 6.2 (L) 12.1 - 17.0 g/dL HCT 20.3 (L) 36.6 - 50.3 % GLUCOSE, POC Collection Time: 12/17/19 11:20 AM  
Result Value Ref Range Glucose (POC) 173 (H) 65 - 100 mg/dL Performed by Ifeanyi Morrell MD

## 2019-12-17 NOTE — PROGRESS NOTES
Physical Therapy Reviewed chart noted in the AM pt receiving transfusion. Post transfusion HGB 6.2, and will be receiving a second unit. Will defer at this time and follow up tomorrow.

## 2019-12-17 NOTE — PROGRESS NOTES
0730: Bedside shift change report given to Alex Dominguez (oncoming nurse) by Wilma Oropeza (offgoing nurse). Report included the following information SBAR and Kardex. 1200: three way neal placed per urology order, continuous irragiation started after manual irrigation done to clear clots. LVAD dressing changed per sterile procedure. 1930: Bedside shift change report given to [facility] (oncoming nurse) by Fifi Hsieh RN (offgoing nurse). Report included the following information SBAR and Kardex. Problem: Falls - Risk of 
Goal: *Absence of Falls Description Document Spike Kapoor Fall Risk and appropriate interventions in the flowsheet. Note: Fall Risk Interventions: 
Mobility Interventions: Communicate number of staff needed for ambulation/transfer Mentation Interventions: Door open when patient unattended Medication Interventions: Patient to call before getting OOB, Evaluate medications/consider consulting pharmacy Elimination Interventions: Call light in reach History of Falls Interventions: Door open when patient unattended

## 2019-12-17 NOTE — PROGRESS NOTES
[address] [facility] in [place], [place] Inpatient Progress Note Patient name: Billy Ramírez Patient : 1950 Patient MRN: [id number] Attending MD: Pete Alas MD 
Date of service: 19 Chief Complaint:  
Louvenia Rinne azucena LVAD implant 
  
HPI: Sona Kiser is a 71y.o. year old pleasant white male with a history of HTN, HLD, JOSE, CAD s/p cardiac arrest VFib s/p CABG () c/b sternal would infection and sternectomy, ischemic cardiomyopathy LVEF 15-20%, s/p ICD and with LBBB. He was admitted with acute on chronic systolic heart failure with massive volume overload > 20 lbs, in the setting of atrial fibrillation s/p failed DCCV and underwent DCCV and RHC on .  S/p BiVICD on 19 with Dr. Santy Owens. He was discharged home home on IV milrinone on 19. Arslan Esteban has been followed closely by Dr. Rachele Nageotte and the [facility]. 
  
Mr. Kiser was admitted for acute on chronic systolic heart failure. He underwent implantation of Impella 5.0 due to CS and has completed his LVAD evaluation. Arslan Esteban meets criteria for implant of HM3 as DT. He was NYHA Class IV prior to implant of Impella 5.0, has LVEF < 15%, was intolerant of GDMT due to symptomatic hypotension and renal dysfunction. He remains dependent on temporary MCS support. RHC  revealed compensated hemodynamics on Impella. His renal function has improved dramatically with improvement in his hemodynamics. He is not a suitable transplant candidate due to single kidney, sternectomy, debility, and frailty. He was reviewed by Lorenzo Brasher and felt to be a high risk heart transplant candidate due to multiple co-morbidities as well as the afore mentioned conditions.  He remained in the CCU and underwent a HM 3 implant as DT on . He was weaned off of pressors and transferred to [place] [address] where he continues to undergo PT/OT and hemodynamic optimization.   
  
24Hr Events Removed CT Hgb dropped to 5.8 Transfused Continues to have hematuria Remains on inotrope Procedure:  Procedure(s): REMOVAL TEMPORARY LEFT VENTRICULAR ASSIST DEVICE, IMPLANTATION OF LEFT VENTRICULAR ASSIST DEVICE PERMANENT (VAD), ECC, JACQUE, EPI AORTIC US BY DR Ashley Edwards.    
POD:  28 
  
Impression / Plan:  
Heart Failure Status: NYHA Class IV Chronic systolic heart failure due to ICM, NYHA Class IV, EF < 15%, cardiogenic shock bridged with Impella 5.0 support to HM 3 implant S/p Impella removal and LVAD implant 11/18/19 Increase RPMs to 6400 rpm  
Consider CTA of outflow cannula when renal function has recovered due to inability to offload LV on high speed? TTE with bubble study negative for PFO Cont milrinone 0.3 mcg/kg/min Obtain daily CardioMEMs readings when in stepdown unit Continue Bumex 2 mg IV BID Cont Sildenafil to 40 mg PO TID - rx sent to local pharmacy to initiate PA Intolerant of BB due to RV failure Intolerant of ACEi/ARB/ARNI/AA due to JUAN J on CKD 3 Strict I/O, daily weights, Na+ restricted diet Continue LVAD education Daily dressing changes until POD 30 TTE 12/16- EF 15-20%, Generalized myoclonus Improved Appreciate Neurology input Continue Keppra with renal dosing Head CT negative for acute process EEG suggestive of mild generalized encephalopathic process, possibly related to underlying structural brain injury vs metabolic abnormality Monitor closely  
  
Anticoagulation for LVAD, INR goal 2-3 Hold ASA d/t hematuria INR 2.9 Hold coumadin tonight-d/w Dr. Sofiya Chin Epistaxis Resolved Afrin soaked guaze ENT consult appreciated Monitor for now  
  
Acute Respiratory Failure post op Improved with aggressive diuresis, but still c/o dyspnea CXR and O2 requirement improved ABG acceptable TTE with Bubble study negative for PFO Pulmonary Consult appreciated Pulmonary hygiene Cont home CPAP- must use while sleeping Thoracentesis with pigtail cath - minimal drainage milrinone dose to 0.3 mcgs/kg/min Acute on CKD 3, atrophic left kidney Appreciate Nephrology assistance Watch Cr - improving Daily diuretic dosing Avoid nephrotoxins, trend labs Renally dose meds  
  
CAD s/p CABG Cont low dose ASA- watch platelets No BB d/t RV failure Hold statin due to recent hepatic failure LHC performed 11/13 - low likelihood of benefit from revascularization  
  
Hx of VF arrest s/p BiV-ICD No recent shocks Keep K > 4 and Mg > 2  
   
PAF Dig level 0.9 - resume low dose dig today (62.5 mcg qMWF) No BB due to RV failure Repeat TFTs WNL 
  
Hx of gout Uric acid WNL Acute blood loss anemia Likely due to hematuria Cont octreotide 25 mcg SQ TID Transfuse to keep Hgb > 7.5 Fecal occult 12/14 negative, positive on 12/17 Transfuse today to keep hgb > 8 Appreciate urology recommendations Start CBI 
GI consult today to r/o GIB 
  
Suspected aspiration pneumonia Sputum culture showing scant growth of yeast 
Cefepime until 12/19 Urinary retention, c/b serratia UTI and hematuria Appreciate Urology consult - asked to see again due to new hematuria, suspected recurrence of UTI Repeat UA with cx  
 cefepime Cystoscopy performed 11/13 shows catheter induced cystitis Sepsis Alert Paired Blood pending Urine cx pending Sputum cx when able Malnutrition Appreciate Nutritionist consult Prealbumin weekly - 13 on 12/16 Continue pureed diet with nectar thick liquids Intolerant of mirtazapine d/t tremors, confusion Resume low dose Marinol  
 
  
COPD Appreciate Pulmonology assistance Continue nebs PRN   
  
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  
  
Hx of sternal wound infection, sternectomy Sternum closed post op- monitor  
  
Vocal cord paralysis Improved ENT recs appreciated Neck CT- R neck edema, no airway compromise Speech therapy following - appreciate input Anxiety Klonipin 0.5 mg TID prn anxiety 
  
Debility Continue PT/OT  
  
Ppx Protonix PT/OT Bowel regimen Continue pureed diet with nectar thick liquids PICC placed 11/22/19  
  
Dispo: Will need IP rehab. Patient seen and examined. Data and note reviewed. I have discussed and agree with the plans as noted. 71year old male with a history of ICM s/p LVAD as DT whose post op course has been complicated by JUAN J on CKD3, RV failure, myoclonus, and more recently acute blood loss anemia from epistaxis and hematuria. Appreciate ENT's assistance with chemical cautery. Await further recommendations from urology regarding his hemorraghic cystitis. Thank you for allowing us to participate in your patient's care. Mounika Kim MD, Nicky Barajas Chief of Cardiology, [facility] Medical Director Calos Rueda [address] [place] [address], [address] [place] [address] Office [phone] Fax [phone] LVAD INTERROGATION: 
Device interrogated in person Device function normal, normal flow, no PI events LVAD Pump Speed (RPM): 6000 Pump Flow (LPM): 5.1 MAP: 89 
PI (Pulsitility Index): 3.9 Power: 4.8  Test: Yes 
Back Up  at Bedside & Labeled: No 
Power Module Test: Yes Driveline Site Care: No 
Driveline Dressing: Clean, Dry, and Intact Outpatient: No 
MAP in Therapeutic Range (Outpatient): Yes Testing Alarms Reviewed: Yes 
Back up SC speed: 6000 Back up Low Speed Limit: 5600 Emergency Equipment with Patient?: Yes Emergency procedures reviewed?: Yes Drive line site inspected?: Yes Drive line intergrity inspected?: Yes Drive line dressing changed?: Yes PHYSICAL EXAM: 
Visit Vitals BP 91/72 (BP 1 Location: Left arm, BP Patient Position: At rest) Pulse 85 Temp 98.5 °F (36.9 °C) Resp 22 Ht 6' 2\" (1.88 m) Wt 186 lb 11.7 oz (84.7 kg) SpO2 100% BMI 23.97 kg/m² Physical Exam  
Constitutional: He is oriented to person, place, and time. He appears well-developed. He appears cachectic. No distress. More fatigued today HENT:  
Head: Normocephalic. Neck: Normal range of motion. Neck supple. No JVD present. Cardiovascular: Normal rate, regular rhythm and normal heart sounds. + VAD hum Pulmonary/Chest: Effort normal and breath sounds normal. No respiratory distress. Abdominal: Soft. Bowel sounds are normal. He exhibits no distension. Dobhoff in place Genitourinary:    Genitourinary Comments: Hematuria Musculoskeletal: Normal range of motion. General: No edema. Neurological: He is alert and oriented to person, place, and time. Skin: Skin is warm and dry. Nursing note and vitals reviewed. REVIEW OF SYSTEMS: 
Review of Systems Constitutional: Positive for malaise/fatigue. Negative for chills and fever. HENT: Positive for hearing loss. Negative for nosebleeds. Respiratory: Negative for cough and shortness of breath. Mild dyspnea Cardiovascular: Negative for chest pain, palpitations, orthopnea and leg swelling. Gastrointestinal: Negative for heartburn, nausea and vomiting. Genitourinary: Positive for hematuria. Musculoskeletal: Negative. Neurological: Positive for weakness. Negative for dizziness and headaches. Endo/Heme/Allergies: Bruises/bleeds easily. PAST MEDICAL HISTORY: 
Past Medical History:  
Diagnosis Date  Degenerative disc disease, lumbar  Heart failure ([address]  High cholesterol  Hypertension  Paroxysmal atrial fibrillation ([address] 4/2/2019  Spinal stenosis PAST SURGICAL HISTORY: 
Past Surgical History:  
Procedure Laterality Date  COLONOSCOPY Left 11/11/2019 COLONOSCOPY at bedside performed by Sandeep Moore MD at [address] [facility]  HX CORONARY ARTERY BYPASS GRAFT    
 triple  HX HERNIA REPAIR    
  HX IMPLANTABLE CARDIOVERTER DEFIBRILLATOR  [place] CARDIOVERSION ELECTIVE ARRHYTHMIA EXTERNAL N/A 6/10/2019 EP CARDIOVERSION performed by Gigi Saint, MD at [address], [facility] [address] CATH LAB  [place] CARDIOVERSION ELECTIVE ARRHYTHMIA EXTERNAL N/A 2019 EP CARDIOVERSION performed by Tianna Lira MD at [address], [facility] [address] CATH LAB  [place] INSJ/RPLCMT PERM DFB W/TRNSVNS LDS 1/DUAL CHMBR N/A 2019 INSERT ICD BIV MULTI performed by Blane Vargas MD at [address], [facility] [address] CATH LAB  [place] TCAT IMPL WRLS P-ART PRS SNR L-T HEMODYN MNTR N/A 2019 IMPLANT HEART FAILURE MONITORING DEVICE performed by Missael Agosto MD at [address], [facility]  CATH LAB FAMILY HISTORY: 
Family History Problem Relation Age of Onset  Lung Disease Mother  Hypertension Mother Canseco Arthritis-osteo Mother  Heart Disease Mother  Heart Disease Father  Diabetes Father SOCIAL HISTORY: 
Social History Socioeconomic History  Marital status:  Spouse name: Not on file  Number of children: Not on file  Years of education: Not on file  Highest education level: Not on file Tobacco Use  Smoking status: Former Smoker Last attempt to quit: 2010 Years since quittin.0  Smokeless tobacco: Never Used Substance and Sexual Activity  Alcohol use: Not Currently Comment: rarely  Drug use: Never Other Topics Concern LABORATORY RESULTS: 
  
Labs Latest Ref Rng & Units 2019 2019 2019 12/15/2019 12/15/2019 2019 2019 WBC 4.1 - 11.1 K/uL - 4.7 4. 0(L) - 5.2 4.7 4.7  
RBC 4.10 - 5.70 M/uL - 1.98(L) 2.35(L) - 2.69(L) 2.70(L) 2.56(L) Hemoglobin 12.1 - 17.0 g/dL 6. 2(L) 5. 8(LL) 6. 9(L) 7. 8(L) 7. 8(L) 7. 8(L) 7. 5(L) Hematocrit 36.6 - 50.3 % 20. 3(L) 18. 8(L) 22. 3(L) 25. 4(L) 25. 8(L) 26. 1(L) 25. 0(L) MCV 80.0 - 99.0 FL - 94.9 94.9 - 95.9 96.7 97.7 Platelets 359 - 385 K/uL - 171 167 - 175 162 157 Lymphocytes 12 - 49 % - - - - - - -  
 Monocytes 5 - 13 % - - - - - - - Eosinophils 0 - 7 % - - - - - - - Basophils 0 - 1 % - - - - - - - Albumin 3.5 - 5.0 g/dL - 2. 2(L) 2. 3(L) - 2. 3(L) 2. 5(L) 2. 5(L) Calcium 8.5 - 10.1 MG/DL - 8.7 8.8 - 8.6 9.0 9.0 SGOT 15 - 37 U/L - 14(L) 14(L) - 12(L) 14(L) 18 Glucose 65 - 100 mg/dL - 117(H) 103(H) - 233(H) 163(H) 123(H) BUN 6 - 20 MG/DL - 44(H) 43(H) - 40(H) 38(H) 39(H) Creatinine 0.70 - 1.30 MG/DL - 2.13(H) 2.15(H) - 2.21(H) 2.31(H) 2.44(H) Sodium 136 - 145 mmol/L - 136 138 - 138 140 140 Potassium 3.5 - 5.1 mmol/L - 3.6 3.5 - 3.4(L) 3.8 3.9 TSH 0.36 - 3.74 uIU/mL - - - - - - -  
PSA 0.01 - 4.0 ng/mL - - - - - - -  
LDH 85 - 241 U/L - 209 214 - 210 242(H) 267(H) CEA ng/mL - - - - - - - Some recent data might be hidden Lab Results Component Value Date/Time TSH 2.30 12/03/2019 03:29 AM  
 TSH 2.40 10/25/2019 07:39 PM  
 TSH 2.45 06/01/2019 04:16 AM  
 
 
ALLERGY: 
No Known Allergies CURRENT MEDICATIONS: 
Current Facility-Administered Medications Medication Dose Route Frequency  alteplase (CATHFLO) 1 mg in sterile water (preservative free) 1 mL injection  1 mg InterCATHeter PRN  
 0.9% sodium chloride infusion 250 mL  250 mL IntraVENous PRN  
 [START ON 12/18/2019] finasteride (PROSCAR) tablet 5 mg  5 mg Oral DAILY  [START ON 12/18/2019] dronabinol (MARINOL) capsule 2.5 mg  2.5 mg Oral DAILY  potassium chloride SR (KLOR-CON 10) tablet 20 mEq  20 mEq Oral DAILY  albuterol-ipratropium (DUO-NEB) 2.5 MG-0.5 MG/3 ML  3 mL Nebulization Q6H RT  
 oxymetazoline (AFRIN) 0.05 % nasal spray 2 Spray  2 Spray Both Nostrils BID  spironolactone (ALDACTONE) tablet 25 mg  25 mg Oral DAILY  clonazePAM (KlonoPIN) tablet 0.5 mg  0.5 mg Oral TID PRN  
 milrinone (PRIMACOR) 20 MG/100 ML D5W infusion  0.3 mcg/kg/min IntraVENous CONTINUOUS  
 sildenafil (pulm.hypertension) (REVATIO) tablet 40 mg  40 mg Oral TID  bumetanide (BUMEX) injection 2 mg  2 mg IntraVENous BID  
  magnesium oxide (MAG-OX) tablet 400 mg  400 mg Oral BID  diphenhydrAMINE (BENADRYL) capsule 25 mg  25 mg Oral QHS PRN  
 octreotide (SANDOSTATIN) injection 25 mcg  25 mcg SubCUTAneous TID  benzocaine-menthol (CEPACOL) lozenge 1 Lozenge  1 Lozenge Mucous Membrane PRN  
 cefepime (MAXIPIME) 2 g in 0.9% sodium chloride (MBP/ADV) 100 mL  2 g IntraVENous Q24H  
 digoxin (LANOXIN) tablet 0.0625 mg  62.5 mcg Oral Q MON, WED & FRI  levETIRAcetam (KEPPRA) oral solution 250 mg  250 mg Oral Q12H  pantoprazole (PROTONIX) tablet 40 mg  40 mg Oral ACB  [Held by provider] aspirin chewable tablet 81 mg  81 mg Oral DAILY  0.9% sodium chloride infusion  3 mL/hr IntraVENous CONTINUOUS  
 allopurinol (ZYLOPRIM) tablet 100 mg  100 mg Oral DAILY  arformoterol (BROVANA) neb solution 15 mcg  15 mcg Nebulization BID RT And  
 budesonide (PULMICORT) 500 mcg/2 ml nebulizer suspension  500 mcg Nebulization BID RT  
 artificial saliva (MOUTH KOTE) 1 Spray  1 Spray Oral PRN  
 lactobac ac& pc-s.therm-b.anim (TIAN Q/RISAQUAD)  1 Cap Oral DAILY  oxyCODONE IR (ROXICODONE) tablet 5 mg  5 mg Oral Q6H PRN  
 balsam peru-castor oil (VENELEX) ointment   Topical TID  tamsulosin (FLOMAX) capsule 0.4 mg  0.4 mg Oral DAILY  insulin lispro (HUMALOG) injection   SubCUTAneous AC&HS  Warfarin MD/NP dosing   Other PRN  
 epoetin yvonne-epbx (RETACRIT) injection 20,000 Units  20,000 Units SubCUTAneous Q7D  
 sodium chloride (NS) flush 5-40 mL  5-40 mL IntraVENous Q8H  
 sodium chloride (NS) flush 5-40 mL  5-40 mL IntraVENous PRN  
 acetaminophen (TYLENOL) tablet 650 mg  650 mg Oral Q6H PRN  
 naloxone (NARCAN) injection 0.4 mg  0.4 mg IntraVENous PRN  
 ondansetron (ZOFRAN) injection 4 mg  4 mg IntraVENous Q4H PRN  
 senna-docusate (PERICOLACE) 8.6-50 mg per tablet 1 Tab  1 Tab Oral DAILY  bisacodyl (DULCOLAX) tablet 5 mg  5 mg Oral DAILY PRN  
 sucralfate (CARAFATE) tablet 1 g  1 g Oral AC&HS  
  influenza vaccine 2019-20 (6 mos+)(PF) (FLUARIX/FLULAVAL/FLUZONE QUAD) injection 0.5 mL  0.5 mL IntraMUSCular PRIOR TO DISCHARGE  hydrALAZINE (APRESOLINE) 20 mg/mL injection 20 mg  20 mg IntraVENous Q6H PRN  
 dextrose 10 % infusion 125-250 mL  125-250 mL IntraVENous PRN Thank you for allowing me to participate in this patient's care. TIERRA Fitch [address] [place] [address], [address] Phone: [phone] Fax: [phone]

## 2019-12-17 NOTE — PROGRESS NOTES
Urology Progress Note Patient: Princess Kim MRN: [id number]  SSN: [id number] YOB: 1950  Age: 71 y.o. Sex: male ADMITTED: 10/25/2019 to Pricila Jackson MD for Acute decompensated [facility] [I50.9] POD# 22 Days Post-Op Procedure(s): LEFT BRACHIAL THROMBECTOMY Reconsulted due to gross hematuria. I saw him earlier this hospitalization due to gross hematuria which eventually cleared. I performed cystoscopy about a month ago at which time he was noted to have a small but vascular prostate and catheter related cystitis but no tumor or worrisome cause identified. He has had recurrence of gross hematuria few days. He has a condom catheter on now. He reports that he is voiding without difficulty. On Coumadin. INR 2.9. Vitals: Temp (24hrs), Av.2 °F (36.8 °C), Min:97.1 °F (36.2 °C), Max:98.5 °F (36.9 °C) Blood pressure 91/72, pulse 84, temperature 98.5 °F (36.9 °C), resp. rate 20, height 6' 2\" (1.88 m), weight 84.7 kg (186 lb 11.7 oz), SpO2 95 %. Intake and Output: 
12/15 1901 -  0700 In: 1400 [P.O.:1400] Out: 4800 [Urine:4800] Abdomen soft and benign. Condom catheter on. Bloody urine in tubing. Labs: 
Labs:  
Lab Results Component Value Date/Time WBC 4.7 2019 04:16 AM  
 HGB 5.8 (LL) 2019 04:16 AM  
 Creatinine 2.13 (H) 2019 04:16 AM  
 
 
 
Assessment/Plan: 
Recurrent gross hematuria. Place large three-way Lizama catheter, hand irrigate for clots and start continuous bladder irrigation. Signed By: Rodo Martinez MD - 2019

## 2019-12-17 NOTE — PROGRESS NOTES
SLP Contact Note Discussed this patient with RN. Per RN, patient is much more improved cognitively, and accidentally received a salad but did not have any difficulty with it. Given that SLP recommended regular diet/nectar-thick liquids on MBS, feel pt safe to resume regular diet but continue with nectar-thick liquids. Will continue to follow. Thank you, Glen Mcdaniels M.Ed, CCC-SLP Speech-Language Pathologist

## 2019-12-18 NOTE — PROGRESS NOTES
[facility] 
 [address] [facility], [address] [facility] Phone: [phone]   [phone]   
  
Nephrology Progress Note Sona Kiser     1950     [id number] Date of Admission : 10/25/2019 
12/18/19 CC:  Follow up for JUAN J, CKD, Hyponatremia Assessment and Plan JUAN J on CKD: 
- likely ATN +/- overdiuresis - Cr stable w/ IV Bumex - I/O inaccurate due to CBI 
- clinically volume status stable - continue current Bumex Gross hematuria, BPH w/ retention: 
- off CBI pre VAD. cysto pre op showed catheter related Cystitis @ postr wall  
- CBI for recurrent hematuria - appreciate urology help Hypokalemia  
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  
- unable to get CTA/MRA 
- On Keppra Acute on Chronic HFrEF  
- EF 16-20%, NYHA Class IV , hx of VF arrest - s/p AICD 
- Impella insertion 10/29- removed 11/18  
- s/p LVAD placement on 11/18 
- s/p right thoracentesis. CT in place Hoarseness, vocal cord paralysis, mediastinal adenopathy: 
- will need eventual PET/CT 
  
L arm clot s/p thrombectomy on 11/25 JOSE on CPAP  
  
PAF s/p DCCV 6/19 
  
Anemia: 
- from blood loss s/p multiple blood products - s/p IV iron,  Repeat iron studies ok 
- on PAVEL q7 d Serratia and Enteroccocus UTI: 
- completed abx Interval History:   
Seen and examined CBI and hematuria clearing up Now on solids and not on pureed diet anymore Denies any new SX 
D/W wife at bedside Review of Systems: as per HPI Current Medications:  
Current Facility-Administered Medications Medication Dose Route Frequency  alteplase (CATHFLO) 1 mg in sterile water (preservative free) 1 mL injection  1 mg InterCATHeter PRN  
 0.9% sodium chloride infusion 250 mL  250 mL IntraVENous PRN  finasteride (PROSCAR) tablet 5 mg  5 mg Oral DAILY  dronabinol (MARINOL) capsule 2.5 mg  2.5 mg Oral DAILY  0.9% sodium chloride infusion 250 mL  250 mL IntraVENous PRN  
 0.9% sodium chloride infusion 250 mL  250 mL IntraVENous PRN  potassium chloride SR (KLOR-CON 10) tablet 20 mEq  20 mEq Oral DAILY  albuterol-ipratropium (DUO-NEB) 2.5 MG-0.5 MG/3 ML  3 mL Nebulization Q6H RT  
 oxymetazoline (AFRIN) 0.05 % nasal spray 2 Spray  2 Spray Both Nostrils BID  spironolactone (ALDACTONE) tablet 25 mg  25 mg Oral DAILY  clonazePAM (KlonoPIN) tablet 0.5 mg  0.5 mg Oral TID PRN  
 milrinone (PRIMACOR) 20 MG/100 ML D5W infusion  0.3 mcg/kg/min IntraVENous CONTINUOUS  
 sildenafil (pulm.hypertension) (REVATIO) tablet 40 mg  40 mg Oral TID  bumetanide (BUMEX) injection 2 mg  2 mg IntraVENous BID  magnesium oxide (MAG-OX) tablet 400 mg  400 mg Oral BID  diphenhydrAMINE (BENADRYL) capsule 25 mg  25 mg Oral QHS PRN  
 octreotide (SANDOSTATIN) injection 25 mcg  25 mcg SubCUTAneous TID  benzocaine-menthol (CEPACOL) lozenge 1 Lozenge  1 Lozenge Mucous Membrane PRN  
 cefepime (MAXIPIME) 2 g in 0.9% sodium chloride (MBP/ADV) 100 mL  2 g IntraVENous Q24H  
 digoxin (LANOXIN) tablet 0.0625 mg  62.5 mcg Oral Q MON, WED & FRI  levETIRAcetam (KEPPRA) oral solution 250 mg  250 mg Oral Q12H  pantoprazole (PROTONIX) tablet 40 mg  40 mg Oral ACB  [Held by provider] aspirin chewable tablet 81 mg  81 mg Oral DAILY  0.9% sodium chloride infusion  3 mL/hr IntraVENous CONTINUOUS  
 allopurinol (ZYLOPRIM) tablet 100 mg  100 mg Oral DAILY  arformoterol (BROVANA) neb solution 15 mcg  15 mcg Nebulization BID RT And  
 budesonide (PULMICORT) 500 mcg/2 ml nebulizer suspension  500 mcg Nebulization BID RT  
 artificial saliva (MOUTH KOTE) 1 Spray  1 Spray Oral PRN  
 lactobac ac& pc-s.therm-b.anim (TIAN Q/RISAQUAD)  1 Cap Oral DAILY  oxyCODONE IR (ROXICODONE) tablet 5 mg  5 mg Oral Q6H PRN  
 balsam peru-castor oil (VENELEX) ointment   Topical TID  tamsulosin (FLOMAX) capsule 0.4 mg  0.4 mg Oral DAILY  insulin lispro (HUMALOG) injection   SubCUTAneous AC&HS  Warfarin MD/NP dosing   Other PRN  
 epoetin yvonne-epbx (RETACRIT) injection 20,000 Units  20,000 Units SubCUTAneous Q7D  
 sodium chloride (NS) flush 5-40 mL  5-40 mL IntraVENous Q8H  
 sodium chloride (NS) flush 5-40 mL  5-40 mL IntraVENous PRN  
 acetaminophen (TYLENOL) tablet 650 mg  650 mg Oral Q6H PRN  
 naloxone (NARCAN) injection 0.4 mg  0.4 mg IntraVENous PRN  
 ondansetron (ZOFRAN) injection 4 mg  4 mg IntraVENous Q4H PRN  
 senna-docusate (PERICOLACE) 8.6-50 mg per tablet 1 Tab  1 Tab Oral DAILY  bisacodyl (DULCOLAX) tablet 5 mg  5 mg Oral DAILY PRN  
 sucralfate (CARAFATE) tablet 1 g  1 g Oral AC&HS  influenza vaccine 2019-20 (6 mos+)(PF) (FLUARIX/FLULAVAL/FLUZONE QUAD) injection 0.5 mL  0.5 mL IntraMUSCular PRIOR TO DISCHARGE  hydrALAZINE (APRESOLINE) 20 mg/mL injection 20 mg  20 mg IntraVENous Q6H PRN  
 dextrose 10 % infusion 125-250 mL  125-250 mL IntraVENous PRN No Known Allergies Objective: 
Vitals:   
Vitals:  
 12/18/19 0185 12/18/19 0650 12/18/19 0749 12/18/19 1116 BP:      
Pulse:  75 80 82 Resp:  21 20 20 Temp:  97.7 °F (36.5 °C) 98 °F (36.7 °C) 97.7 °F (36.5 °C) SpO2:  99% 97% 97% Weight: 84.8 kg (186 lb 15.2 oz) Height: 6' 2\" (1.88 m) Intake and Output: 
12/18 0701 - 12/18 1900 In: 53710.8 [P.O.:100; I.V.:25.8] Out: 96719  
12/16 1901 - 12/18 0700 In: 07602.7 [P.O.:1760; I.V.:62.9] Out: 12946 [Critical access hospital:1714] Physical Examination: 
 
General: Elderly man in no acute distress HEENT:            Ng in place; very dry mucous membranes Neck:  Lines + Resp:  Decreased bibasilar breath sounds; no resp distress CV:  VAD sounds;  1-2+ LE edema Chest:             Chest tube in place GI:  Soft and non-tender; no distension Neurologic:  Alert and appropriate; normal speech :  + neal; gross hematuria [x]    High complexity decision making was performed 
[x]    Patient is at high-risk of decompensation with multiple organ involvement Lab Data Personally Reviewed: I have reviewed all the pertinent labs, microbiology data and radiology studies during assessment. Recent Labs 12/18/19 
0406 12/17/19 
1388 12/16/19 
0410 * 136 138  
K 3.6 3.6 3.5 CL 95* 98 99 CO2 35* 33* 34* GLU 96 117* 103* BUN 41* 44* 43* CREA 2.09* 2.13* 2.15* CA 8.7 8.7 8.8 MG 1.9 2.1 2.0 ALB 2.3* 2.2* 2.3*  
SGOT 14* 14* 14* ALT 11* 12 10* INR 3.5* 2.9* 2.2* Recent Labs 12/18/19 
1001 12/18/19 
0406 12/17/19 
2209 12/17/19 
1102 12/17/19 
0416 12/16/19 
0410 WBC  --  4.9  --   --  4.7 4.0* HGB 7.9* 6.8* 7.1* 6.2* 5.8* 6.9*  
HCT 24.9* 21.9* 22.7* 20.3* 18.8* 22.3*  
PLT  --  158  --   --  171 167 No results found for: SDES Lab Results Component Value Date/Time Culture result: NO GROWTH 3 DAYS 12/15/2019 03:37 PM  
 Culture result: NO GROWTH 1 DAY 12/15/2019 03:30 PM  
 Culture result: NO GROWTH 5 DAYS 12/06/2019 11:23 PM  
 Culture result: HEAVY ENTEROCOCCUS SPECIES NOTED (A) 11/27/2019 11:10 AM  
 Culture result: LIGHT YEAST (A) 11/27/2019 11:10 AM  
 
Recent Results (from the past 24 hour(s)) GLUCOSE, POC Collection Time: 12/17/19  4:08 PM  
Result Value Ref Range Glucose (POC) 120 (H) 65 - 100 mg/dL Performed by Demetra Lawrence, POC Collection Time: 12/17/19  9:27 PM  
Result Value Ref Range Glucose (POC) 139 (H) 65 - 100 mg/dL Performed by Gardenia Madden (CON) HGB & HCT Collection Time: 12/17/19 10:09 PM  
Result Value Ref Range HGB 7.1 (L) 12.1 - 17.0 g/dL HCT 22.7 (L) 36.6 - 50.3 % METABOLIC PANEL, COMPREHENSIVE Collection Time: 12/18/19  4:06 AM  
Result Value Ref Range Sodium 133 (L) 136 - 145 mmol/L Potassium 3.6 3.5 - 5.1 mmol/L Chloride 95 (L) 97 - 108 mmol/L  
 CO2 35 (H) 21 - 32 mmol/L  Anion gap 3 (L) 5 - 15 mmol/L  
 Glucose 96 65 - 100 mg/dL BUN 41 (H) 6 - 20 MG/DL Creatinine 2.09 (H) 0.70 - 1.30 MG/DL  
 BUN/Creatinine ratio 20 12 - 20 GFR est AA 38 (L) >60 ml/min/1.73m2 GFR est non-AA 32 (L) >60 ml/min/1.73m2 Calcium 8.7 8.5 - 10.1 MG/DL Bilirubin, total 0.6 0.2 - 1.0 MG/DL  
 ALT (SGPT) 11 (L) 12 - 78 U/L  
 AST (SGOT) 14 (L) 15 - 37 U/L Alk. phosphatase 82 45 - 117 U/L Protein, total 6.2 (L) 6.4 - 8.2 g/dL Albumin 2.3 (L) 3.5 - 5.0 g/dL Globulin 3.9 2.0 - 4.0 g/dL A-G Ratio 0.6 (L) 1.1 - 2.2 MAGNESIUM Collection Time: 12/18/19  4:06 AM  
Result Value Ref Range Magnesium 1.9 1.6 - 2.4 mg/dL CBC W/O DIFF Collection Time: 12/18/19  4:06 AM  
Result Value Ref Range WBC 4.9 4.1 - 11.1 K/uL  
 RBC 2.37 (L) 4.10 - 5.70 M/uL HGB 6.8 (L) 12.1 - 17.0 g/dL HCT 21.9 (L) 36.6 - 50.3 % MCV 92.4 80.0 - 99.0 FL  
 MCH 28.7 26.0 - 34.0 PG  
 MCHC 31.1 30.0 - 36.5 g/dL  
 RDW 16.6 (H) 11.5 - 14.5 % PLATELET 123 572 - 044 K/uL MPV 9.8 8.9 - 12.9 FL  
 NRBC 0.0 0  WBC ABSOLUTE NRBC 0.00 0.00 - 0.01 K/uL PROTHROMBIN TIME + INR Collection Time: 12/18/19  4:06 AM  
Result Value Ref Range INR 3.5 (H) 0.9 - 1.1 Prothrombin time 33.5 (H) 9.0 - 11.1 sec PTT Collection Time: 12/18/19  4:06 AM  
Result Value Ref Range aPTT 41.9 (H) 22.1 - 32.0 sec  
 aPTT, therapeutic range     58.0 - 77.0 SECS  
NT-PRO BNP Collection Time: 12/18/19  4:06 AM  
Result Value Ref Range NT pro-BNP 5,789 (H) <125 PG/ML  
DIGOXIN Collection Time: 12/18/19  4:06 AM  
Result Value Ref Range Digoxin level 0.8 (L) 0.90 - 2.00 NG/ML  
LD Collection Time: 12/18/19  4:06 AM  
Result Value Ref Range  85 - 241 U/L  
LACTIC ACID Collection Time: 12/18/19  4:06 AM  
Result Value Ref Range Lactic acid 1.3 0.4 - 2.0 MMOL/L  
GLUCOSE, POC Collection Time: 12/18/19  7:09 AM  
Result Value Ref Range Glucose (POC) 138 (H) 65 - 100 mg/dL Performed by Sangita Caldwell (CHAU) HGB & HCT Collection Time: 12/18/19 10:01 AM  
Result Value Ref Range HGB 7.9 (L) 12.1 - 17.0 g/dL HCT 24.9 (L) 36.6 - 50.3 % GLUCOSE, POC Collection Time: 12/18/19 11:11 AM  
Result Value Ref Range Glucose (POC) 143 (H) 65 - 100 mg/dL Performed by Julius Short MD

## 2019-12-18 NOTE — PROGRESS NOTES
0730: Bedside shift change report given to Alex Trejo Angelica (oncoming nurse) by Celi Simon RN (offgoing nurse). Report included the following information SBAR, Kardex, and Cardiac Rhythm paced . 1930: Bedside shift change report given to 1812 Sivan Cedeño (oncoming nurse) by Brianna Verdugo RN (offgoing nurse). Report included the following information SBAR and Kardex. Problem: Falls - Risk of 
Goal: *Absence of Falls Description Document [facility] Fall Risk and appropriate interventions in the flowsheet. Outcome: Progressing Towards Goal 
Note: Fall Risk Interventions: 
Mobility Interventions: Communicate number of staff needed for ambulation/transfer, Patient to call before getting OOB, PT Consult for mobility concerns, PT Consult for assist device competence, Strengthening exercises (ROM-active/passive), Utilize walker, cane, or other assistive device, Utilize gait belt for transfers/ambulation Mentation Interventions: Adequate sleep, hydration, pain control, Door open when patient unattended, Evaluate medications/consider consulting pharmacy, Family/sitter at bedside, Increase mobility, More frequent rounding, Reorient patient, Room close to nurse's station Medication Interventions: Assess postural VS orthostatic hypotension, Evaluate medications/consider consulting pharmacy, Patient to call before getting OOB, Teach patient to arise slowly, Utilize gait belt for transfers/ambulation Elimination Interventions: Call light in reach, Patient to call for help with toileting needs, Stay With Me (per policy), Toilet paper/wipes in reach, Toileting schedule/hourly rounds, Urinal in reach History of Falls Interventions: Consult care management for discharge planning, Door open when patient unattended, Evaluate medications/consider consulting pharmacy, Investigate reason for fall, Room close to nurse's station, Utilize gait belt for transfer/ambulation, Assess for delayed presentation/identification of injury for 48 hrs (comment for end date) Problem: Patient Education: Go to Patient Education Activity Goal: Patient/Family Education Outcome: Progressing Towards Goal

## 2019-12-18 NOTE — PROGRESS NOTES
Problem: Mobility Impaired (Adult and Pediatric) Goal: *Acute Goals and Plan of Care (Insert Text) Description FUNCTIONAL STATUS PRIOR TO ADMISSION: Patient was modified independent using a single point cane for functional mobility. Patient reports an increasingly sedentary lifestyle 2* fatigue and SOB/dyspnea. Retired (so is his wife). Patient reports x 3 falls within the last couple of weeks. Patient is wearing either nasal cannula or CPAP at night. LVAD work-up has been initiated. HOME SUPPORT PRIOR TO ADMISSION: The patient lived with his wife, but did not require physical assistance. Physical Therapy Goals: 
 
Re-evaluation completed 11/20/2019 and new goals formulated following LVAD implantation. Reviewed and cont 12/3/2019. Reviewed and continued 12/12/2019 1. Patient will move from supine to sit and sit to supine, scoot up and down and roll side to side in bed with minimal assistance/contact guard assist within 7 days. 2.  Patient will perform sit to/from stand with minimal assistance/contact guard assist within 7 days. 3.  Patient will ambulate 150 feet with least restrictive assistive device and minimal assistance/contact guard assist within 7 days. 4.  Patient will ascend/descend 4 stairs with handrail(s) with minimal assistance/contact guard assist within 7 days. 5.  Patient will perform cardiac exercises per protocol with supervision within 7 days. 6.  Patient will verbally and functionally recall 3/3 sternal precautions within 7 days. 7.  Patient will perform power exchange for power module to/from battery with moderate assistance  within 7 days. Initiated 10/27/2019; updated 11/15/2019 1. Patient will move from supine to sit and sit to supine, scoot up and down and roll side to side in bed with independence within 7 days. 2.  Patient will perform sit to/from stand with supervision/set-up within 7 days. 3.  Patient will ambulate 200 feet with least restrictive assistive device and supervision/set-up within 7 days. 4.  Patient will ascend/descend 4 stairs with  handrail(s) with supervision/set-up within 7 days for functional strengthening and community reintegration. Mihaela Riser 5.  Patient will verbally and functionally recall 3/3 sternal precautions within 7 days in preparation for LVAD implantation. 6.  Patient will perform a mock power exchange for power module to/from battery with supervision/set-up within 7 days in preparation for LVAD implantation. 1.  Patient will move from supine to sit and sit to supine, scoot up and down and roll side to side in bed with independence within 7 days. 2.  Patient will perform sit to/from stand with supervision/set-up within 7 days. 3.  Patient will ambulate 150 feet with least restrictive assistive device and supervision/set-up within 7 days. 4.  Patient will ascend/descend 4 stairs with  handrail(s) with supervision/set-up within 7 days for functional strengthening and community reintegration. Mihaela Riser 5.  Patient will verbally and functionally recall 3/3 sternal precautions within 7 days in preparation for LVAD implantation. 6.  Patient will perform a mock power exchange for power module to/from battery with supervision/set-up within 7 days in preparation for LVAD implantation. Outcome: Progressing Towards Goal 
 
PHYSICAL THERAPY TREATMENT Patient: Matt Bailey (75 y.o. male) Date: 12/18/2019 Diagnosis: [facility] [I50.9] <principal problem not specified> Procedure(s) (LRB): LEFT BRACHIAL THROMBECTOMY (Left) 23 Days Post-Op Precautions: Fall, Sternal(LVAD ) Chart, physical therapy assessment, plan of care and goals were reviewed. ASSESSMENT Patient continues with skilled PT services and is slowly progressing towards goals - limited by epistaxis, lethargy/drowsiness, continuous bladder irrigation, decrease in hemoglobin + transfusions, ataxia/poor coordination, inability to track with right eye into left or upper visual field, dyspnea, and quick fatigue onset. Patient participated in the following (each requiring extensive rest breaks in-between): Seated reaching tasks into all quadrants (dysmetria and past-pointing noted), sit <> stand transfers for david-hygiene, static standing to fatigue (~ 1 minute), bed mobility, occular-motor exercises, and UE/LE coordination exercises. Patient continues to require the skilled physical assistance of two therapists. Continue to anticipate extensive rehabilitation needs. Current Level of Function Impacting Discharge (mobility/balance): Unsafe to attempt bed > chair transfer 2* ataxia and quick fatigue onset. Other factors to consider for discharge: Prolonged/Complex hospital stay PLAN : 
Patient continues to benefit from skilled intervention to address the above impairments. Continue treatment per established plan of care. to address goals. Recommendation for discharge: (in order for the patient to meet his/her long term goals) To be determined: Rehab This discharge recommendation: 
Has been made in collaboration with the attending provider and/or case management IF patient discharges home will need the following DME: to be determined (TBD) SUBJECTIVE:  
Patient stated Good Morning.  Patient disoriented to time. OBJECTIVE DATA SUMMARY:  
Critical Behavior: 
Neurologic State: Alert, Appropriate for age Orientation Level: Oriented X4 Cognition: Follows commands Safety/Judgement: Decreased insight into deficits, Decreased awareness of need for safety, Decreased awareness of need for assistance Functional Mobility Training: 
Bed Mobility: 
  
Supine to Sit: Contact guard assistance(A x 2 for safety) Sit to Supine: Minimum assistance(A x 2 for safety + line management) Scooting: Minimum assistance(Maximal cues for UE proximation to ribcage) Transfers: 
Sit to Stand: Assist x2;Minimum assistance Stand to Sit: Assist x2;Minimum assistance Balance: 
Sitting: Impaired; Without support Sitting - Static: Good (unsupported) Sitting - Dynamic: Good (unsupported); Fair (occasional)(Posterior trunk lean with UE and LE exercises) Standing: Impaired; With support Standing - Static: Fair;Constant support Standing - Dynamic : Poor;Constant support Activity Tolerance:  
Fair - Patient is agreeable to all PT interventions Please refer to the flowsheet for vital signs taken during this treatment. After treatment patient left in no apparent distress:  
Supine in bed, Call bell within reach, and Side rails x 3 
 
COMMUNICATION/COLLABORATION:  
The patients plan of care was discussed with: Occupational Therapist, Registered Nurse, and  Gerianne Osgood, PT, DPT Time Calculation: 39 mins

## 2019-12-18 NOTE — PROGRESS NOTES
[facility] VAD/Heart Failure Progress Note Admit Date: 10/25/2019 POD:  23 Days Post-Op Procedure:  Procedure(s): LEFT BRACHIAL THROMBECTOMY Subjective:  
Mild dyspnea, fatigue, and weakness; hematuria Objective:  
Vitals: 
Blood pressure 91/72, pulse 82, temperature 97.7 °F (36.5 °C), resp. rate 20, height 6' 2\" (1.88 m), weight 186 lb 15.2 oz (84.8 kg), SpO2 97 %. Temp (24hrs), Av.8 °F (36.6 °C), Min:97.5 °F (36.4 °C), Max:98.1 °F (36.7 °C) Hemodynamics: 
 CO: CO (l/min): 5.8 l/min CI: CI (l/min/m2): 2.8 l/min/m2 CVP: CVP (mmHg): 4 mmHg (19 1645) SVR: SVR (dyne*sec)/cm5: 1080 (dyne*sec)/cm5 (19 1200) PAP Systolic: PAP Systolic: 34 (45/07/15 9826) PAP Diastolic: PAP Diastolic: 13 ( 3240) PVR:   
 SV02: SVO2 (%): 67 % (19 1300) SCV02: SCVO2 (%): 75 % (10/29/19 1900) VAD Interrogation: LVAD (Heartmate) Pump Speed (RPM): 6400 Pump Flow (LPM): 5.8 PI (Pulsitility Index): 3.9 Power: 5.6 MAP: 90  Test: No 
Back Up  at Bedside & Labeled: Yes Power Module Test: No 
Driveline Site Care: Yes Driveline Dressing: Clean, Dry, and Intact EKG/Rhythm:   
 
Extubation Date / Time:  
 
CT Output:  
 
Ventilator: 
Ventilator Volumes Vt Set (ml): 550 ml (19 08) Vt Exhaled (Machine Breath) (ml): 639 ml (19 08) Vt Spont (ml): 437 ml (19 1035) Ve Observed (l/min): 7.25 l/min (19 1035) Oxygen Therapy: 
Oxygen Therapy O2 Sat (%): 97 % (19 111) Pulse via Oximetry: 80 beats per minute (19) O2 Device: Nasal cannula (19) O2 Flow Rate (L/min): 3 l/min (19) FIO2 (%): 21 % (19 1721) CXR: 
 
Admission Weight: Last Weight Weight: 192 lb 10.9 oz (87.4 kg) Weight: 186 lb 15.2 oz (84.8 kg) Intake / Output / Drain: 
Current Shift: 701 - 1900 In: 51551.8 [P.O.:100; I.V.:25.8] Out: 08336 Last 24 hrs.:  
 
 Intake/Output Summary (Last 24 hours) at 12/18/2019 1405 Last data filed at 12/18/2019 1312 Gross per 24 hour Intake 52992.63 ml Output 23986 ml Net 1736.63 ml No results for input(s): CPK, CKMB, TROIQ in the last 72 hours. Recent Labs 12/18/19 
1001 12/18/19 
0406 12/17/19 
2209  12/17/19 
0416 12/16/19 
0410 NA  --  133*  --   --  136 138 K  --  3.6  --   --  3.6 3.5 CO2  --  35*  --   --  33* 34* BUN  --  41*  --   --  44* 43* CREA  --  2.09*  --   --  2.13* 2.15* GLU  --  96  --   --  117* 103* MG  --  1.9  --   --  2.1 2.0 WBC  --  4.9  --   --  4.7 4.0* HGB 7.9* 6.8* 7.1*   < > 5.8* 6.9*  
HCT 24.9* 21.9* 22.7*   < > 18.8* 22.3*  
PLT  --  158  --   --  171 167  
 < > = values in this interval not displayed. Recent Labs 12/18/19 
0406 12/17/19 
6022 12/16/19 
0414 12/16/19 
0410 INR 3.5* 2.9*  --  2.2* PTP 33.5* 28.2*  --  21.5* APTT 41.9* 41.4* 35.6*  -- No lab exists for component: PBNP Current Facility-Administered Medications:  
  phytonadione (VITAMIN K) 1 mg/mL oral solution 2.5 mg, 2.5 mg, Oral, NOW, LeviShana, NP 
  [START ON 12/19/2019] potassium chloride SR (KLOR-CON 10) tablet 40 mEq, 40 mEq, Oral, DAILY, Levi, Shana B, NP 
  alteplase (CATHFLO) 1 mg in sterile water (preservative free) 1 mL injection, 1 mg, InterCATHeter, PRN, Mounika Altman MD, 2 mg at 12/17/19 0600 
  0.9% sodium chloride infusion 250 mL, 250 mL, IntraVENous, PRN, Bina Herrera, NP 
  finasteride (PROSCAR) tablet 5 mg, 5 mg, Oral, DAILY, Winnie Washington MD, 5 mg at 12/18/19 4405 
  dronabinol (MARINOL) capsule 2.5 mg, 2.5 mg, Oral, DAILY, Shana Sims, NP, 2.5 mg at 12/18/19 0839 
  0.9% sodium chloride infusion 250 mL, 250 mL, IntraVENous, PRN, Alexi Simsin B, NP 
  0.9% sodium chloride infusion 250 mL, 250 mL, IntraVENous, PRN, Levi, Shana B, NP 
   albuterol-ipratropium (DUO-NEB) 2.5 MG-0.5 MG/3 ML, 3 mL, Nebulization, Q6H RT, Rian Andrews MD, Stopped at 12/18/19 0800 
  oxymetazoline (AFRIN) 0.05 % nasal spray 2 Spray, 2 Spray, Both Nostrils, BID, William Altman MD, 2 [place] at 12/18/19 9460   spironolactone (ALDACTONE) tablet 25 mg, 25 mg, Oral, DAILY, Mounika Altman MD, 25 mg at 12/17/19 3747   clonazePAM (KlonoPIN) tablet 0.5 mg, 0.5 mg, Oral, TID PRN, Mounika Altman MD, 0.5 mg at 12/17/19 1416 
  milrinone (PRIMACOR) 20 MG/100 ML D5W infusion, 0.3 mcg/kg/min, IntraVENous, CONTINUOUS, Mounika Altman MD, Last Rate: 7.5 mL/hr at 12/18/19 0839, 0.3 mcg/kg/min at 12/18/19 0839 
  sildenafil (pulm.hypertension) (REVATIO) tablet 40 mg, 40 mg, Oral, TID, Mounika Altman MD, 40 mg at 12/18/19 0420   bumetanide (BUMEX) injection 2 mg, 2 mg, IntraVENous, BID, Mounika Altman MD, 2 mg at 12/18/19 0841 
  magnesium oxide (MAG-OX) tablet 400 mg, 400 mg, Oral, BID, Alix Herrera NP, 400 mg at 12/18/19 0840   diphenhydrAMINE (BENADRYL) capsule 25 mg, 25 mg, Oral, QHS PRN, Alix Herrera NP 
  octreotide (SANDOSTATIN) injection 25 mcg, 25 mcg, SubCUTAneous, TID, Erna Herrera NP, 25 mcg at 12/18/19 0849 
  benzocaine-menthol (CEPACOL) lozenge 1 Lozenge, 1 Lozenge, Mucous Membrane, PRN, Polliard, Mozella Arrow, NP 
  cefepime (MAXIPIME) 2 g in 0.9% sodium chloride (MBP/ADV) 100 mL, 2 g, IntraVENous, Q24H, Erna Herrera NP, Last Rate: 200 mL/hr at 12/18/19 1230, 2 g at 12/18/19 1230   digoxin (LANOXIN) tablet 0.0625 mg, 62.5 mcg, Oral, Q MON, WED & FRI, Erna Herrera NP, 0.0625 mg at 12/16/19 2210   levETIRAcetam (KEPPRA) oral solution 250 mg, 250 mg, Oral, Q12H, Erna Herrera, NP, 250 mg at 12/18/19 0631 
  pantoprazole (PROTONIX) tablet 40 mg, 40 mg, Oral, ACB, Erna Herrera NP, 40 mg at 12/18/19 8148   [Held by provider] aspirin chewable tablet 81 mg, 81 mg, Oral, DAILY, Phil Gaytan MD, 81 mg at 12/16/19 0843 
  0.9% sodium chloride infusion, 3 mL/hr, IntraVENous, CONTINUOUS, Oksana Herrera NP, Last Rate: 5 mL/hr at 12/13/19 0800, 5 mL/hr at 12/13/19 0800 
  allopurinol (ZYLOPRIM) tablet 100 mg, 100 mg, Oral, DAILY, Oksana Herrera NP, 100 mg at 12/18/19 0839 
  arformoterol (BROVANA) neb solution 15 mcg, 15 mcg, Nebulization, BID RT, 15 mcg at 12/17/19 2110 **AND** budesonide (PULMICORT) 500 mcg/2 ml nebulizer suspension, 500 mcg, Nebulization, BID RT, Bipin Herrera NP, Stopped at 12/18/19 0900 
  artificial saliva (MOUTH KOTE) 1 Spray, 1 Spray, Oral, PRN, Bipin Herrera NP, 1 [place] at 12/12/19 1452   lactobac ac& pc-s.therm-b.anim (TIAN Q/RISAQUAD), 1 Cap, Oral, DAILY, Andrey Mari MD, 1 Cap at 12/18/19 9162 
  oxyCODONE IR (ROXICODONE) tablet 5 mg, 5 mg, Oral, Q6H PRN, Jemma De Anda MD, 5 mg at 12/18/19 8749   balsam peru-castor oil (VENELEX) ointment, , Topical, TID, Stacey Andrews MD 
  [facility]) capsule 0.4 mg, 0.4 mg, Oral, DAILY, Logan Kincaid MD, 0.4 mg at 12/18/19 0839 
  insulin lispro (HUMALOG) injection, , SubCUTAneous, AC&HS, Shana Sims NP, 2 Units at 12/18/19 1230   Warfarin MD/NP dosing, , Other, PRN, Mounika Altman MD 
  epoetin yvonne-epbx (RETACRIT) injection 20,000 Units, 20,000 Units, SubCUTAneous, Q7D, Logan Kincaid MD, 20,000 Units at 12/17/19 0721 
  sodium chloride (NS) flush 5-40 mL, 5-40 mL, IntraVENous, Q8H, Jemma De Anda MD, 10 mL at 12/18/19 9450   sodium chloride (NS) flush 5-40 mL, 5-40 mL, IntraVENous, PRN, Jemma De Anda MD, 40 mL at 12/17/19 4071   acetaminophen (TYLENOL) tablet 650 mg, 650 mg, Oral, Q6H PRN, Jemma De Anda MD, 650 mg at 12/10/19 0105 
  naloxone [facility]) injection 0.4 mg, 0.4 mg, IntraVENous, PRN, Jemma De Anda MD 
  ondansetron [facility]) injection 4 mg, 4 mg, IntraVENous, Q4H PRN, Aurora Ann MD, 4 mg at 12/18/19 1302   senna-docusate (PERICOLACE) 8.6-50 mg per tablet 1 Tab, 1 Tab, Oral, DAILY, Aurora Ann MD, 1 Tab at 12/18/19 0690 
  bisacodyl (DULCOLAX) tablet 5 mg, 5 mg, Oral, DAILY PRN, Aurora Ann MD 
  sucralfate (CARAFATE) tablet 1 g, 1 g, Oral, AC&HS, Aurora Ann MD, 1 g at 12/18/19 1230 
  influenza vaccine 2019-20 (6 mos+)(PF) (FLUARIX/FLULAVAL/FLUZONE QUAD) injection 0.5 mL, 0.5 mL, IntraMUSCular, PRIOR TO DISCHARGE, Elijah Altman MD 
  hydrALAZINE (APRESOLINE) 20 mg/mL injection 20 mg, 20 mg, IntraVENous, Q6H PRN, LeviShana metzger, NP, 20 mg at 12/10/19 0541 
  dextrose 10 % infusion 125-250 mL, 125-250 mL, IntraVENous, PRN, Vika Ibarra MD, Stopped at 11/18/19 0700 A/P 
  
S/P LVAD - good flows Need for anti-coagulation - coumadin DM - insulin RV dysfunction - milrinone, diuretics  
  
Risk of morbidity and mortality - high Medical decision making - high complexity 
  
Signed By: Joseph Lindquist MD

## 2019-12-18 NOTE — PROGRESS NOTES
Urology Progress Note Patient: Martha Gallagher MRN: [id number]  SSN: [id number] YOB: 1950  Age: 71 y.o. Sex: male ADMITTED: 10/25/2019 to Yue Dalal MD for [facility] [I50.9] POD# 23 Days Post-Op Procedure(s): LEFT BRACHIAL THROMBECTOMY Follow-up re gross hematuria. Lizama draining well. Moderate hematuria. Urine clears with increasing CBI. Vitals: Temp (24hrs), Av.8 °F (36.6 °C), Min:97.5 °F (36.4 °C), Max:98.1 °F (36.7 °C) Blood pressure 91/72, pulse 82, temperature 97.7 °F (36.5 °C), resp. rate 20, height 6' 2\" (1.88 m), weight 84.8 kg (186 lb 15.2 oz), SpO2 97 %. Intake and Output: 
 1901 -  0700 In: 92403.7 [P.O.:1760; I.V.:62.9] Out: 15244 [[id number]] Abdomen soft and benign. Bladder not distended. Labs: 
Labs:  
Lab Results Component Value Date/Time WBC 4.9 2019 04:06 AM  
 HGB 7.9 (L) 2019 10:01 AM  
 Creatinine 2.09 (H) 2019 04:06 AM  
 
 
 
Assessment/Plan: 
 Continue CBI. Hand irrigate every 4 hours and as needed. Signed By: Senait Barron MD - 2019

## 2019-12-18 NOTE — PROGRESS NOTES
Problem: Self Care Deficits Care Plan (Adult) Goal: *Acute Goals and Plan of Care (Insert Text) Description FUNCTIONAL STATUS PRIOR TO ADMISSION: Patient was modified independent using a single point cane for functional mobility. Patient able to shower and dress himself. However, patient required assistance for household management from his wife. HOME SUPPORT: The patient lived alone with wife to provide assistance. Occupational Therapy Goals: OT weekly reassessment 12/6/19: goals modified 1. Patient will perform upper body dressing moderate assistance within 7 day(s). 2.  Patient will perform lower body dressing with moderate assistance within 7 day(s). 3.  Patient will perform grooming seated EOB with minimum assistance within 7 day(s). 4.  Patient will perform toilet transfers with minimum assistance within 7 day(s). 5.  Patient will perform all aspects of toileting with moderate assistance within 7 day(s). 6.  Patient will participate in upper extremity therapeutic exercise/activities with supervision/set-up-min A for 5 minutes within 7 day(s). 7.  Patient will utilize energy conservation techniques during functional activities with verbal cues within 7 day(s). 8. Patient will verbalize 3/5 LVAD components with min verbal cues within 7 day (s). OT weekly reassessment 11/29/19: goals modified below 1. Patient will perform upper body dressing including LVAD switchovers with moderate assistance within 7 day(s). 2.  Patient will perform lower body dressing with minimal assistance within 7 day(s). 3.  Patient will perform grooming in standing with minimum assistance within 7 day(s). 4.  Patient will perform toilet transfers with minimum assistance within 7 day(s). 5.  Patient will perform all aspects of toileting with minimal assistance within 7 day(s).  
6.  Patient will participate in upper extremity therapeutic exercise/activities with supervision/set-up for 5 minutes within 7 day(s). 7.  Patient will utilize energy conservation techniques during functional activities with verbal cues within 7 day(s). 8. Patient will verbalize LVAD terminology with verbal cues within 7 day (s). Goals reviewed and continued/modified as follows 11/20/19 s/p LVAD implantation 1. Patient will perform upper body dressing including LVAD switchovers with moderate assistance within 7 day(s). 2.  Patient will perform lower body dressing with minimal assistance within 7 day(s). 3.  Patient will perform grooming with supervision/setup within 7 day(s). 4.  Patient will perform toilet transfers supervision/setupmodified independence within 7 day(s). 5.  Patient will perform all aspects of toileting with minimal assistance within 7 day(s). 6.  Patient will participate in upper extremity therapeutic exercise/activities with supervision/set-up for 5 minutes within 7 day(s). 7.  Patient will utilize energy conservation techniques during functional activities with verbal cues within 7 day(s). 8. Patient will verbalize LVAD terminology with verbal cues within 7 day (s). Goals reviewed and continued/modified as follows 11/12/19 1. Patient will perform bathing with supervision/set-up within 7 day(s). 2.  Patient will perform lower body dressing with supervision/set-up within 7 day(s). 3.  Patient will perform grooming with modified independence within 7 day(s). 4.  Patient will perform toilet transfers with modified independence within 7 day(s). 5.  Patient will perform all aspects of toileting with supervision/set-up within 7 day(s). 6.  Patient will participate in upper extremity therapeutic exercise/activities with supervision/set-up for 5 minutes within 7 day(s). 7.  Patient will utilize energy conservation techniques during functional activities with verbal cues within 7 day(s). 8. Patient will verbalize LVAD terminology with verbal cues within 7 day (s) in preparation for implant. Continue all goals 10/30/19 post re-eval for Impella removal  
Initiated 10/28/2019 1. Patient will perform bathing with supervision/set-up within 7 day(s). 2.  Patient will perform lower body dressing with supervision/set-up within 7 day(s). 3.  Patient will perform grooming with modified independence within 7 day(s). 4.  Patient will perform toilet transfers with modified independence within 7 day(s). 5.  Patient will perform all aspects of toileting with supervision/set-up within 7 day(s). 6.  Patient will participate in upper extremity therapeutic exercise/activities with supervision/set-up for 5 minutes within 7 day(s). 7.  Patient will utilize energy conservation techniques during functional activities with verbal cues within 7 day(s). Outcome: Progressing Towards Goal 
 OCCUPATIONAL THERAPY TREATMENT Patient: Christoph Butterfield (75 y.o. male) Date: 12/18/2019 Diagnosis: Acute decompensated heart failure ([facility] [I50.9] <principal problem not specified> Procedure(s) (LRB): LEFT BRACHIAL THROMBECTOMY (Left) 23 Days Post-Op Precautions: Fall, Sternal(LVAD ) Chart, occupational therapy assessment, plan of care, and goals were reviewed. ASSESSMENT Patient continues with skilled OT services and is progressing towards goals. Patient continues to present with hematuria with Hgb 7.9 today and patient received on CPAP. Patient transferred to 2L NC O2 for therapeutic activity with patient present with generalized weakness, decreased endurance, decreased activity tolerance, and with ataxia (impaired sitting/standing balance) and impulsivity. Patient CGAx2 for supine > sit today and requiring MIN-MOD A x 2 for sit <> stand transfers for sitting on bed pan.  Patient continent of bowel on bed pan and required MAX A for pericare standing. Patient endorsing fatigue following activity and was returned to bed as described below. Current Level of Function Impacting Discharge (ADLs): MAX A toileting Other factors to consider for discharge: medical complexity PLAN : 
Patient continues to benefit from skilled intervention to address the above impairments. Continue treatment per established plan of care. to address goals. Recommend with staff: OOB x 3 to chair Recommend next OT session: standing ADLs Recommendation for discharge: (in order for the patient to meet his/her long term goals) Therapy 3 hours/day, 5 days a week This discharge recommendation: 
Has been made in collaboration with the attending provider and/or case management IF patient discharges home will need the following DME: to be determined (TBD) SUBJECTIVE:  
Patient stated I am tired today.  OBJECTIVE DATA SUMMARY:  
Cognitive/Behavioral Status: 
Neurologic State: Alert Orientation Level: Oriented X4 Cognition: Appropriate for age attention/concentration Perception: Appears intact Perseveration: No perseveration noted Safety/Judgement: Decreased insight into deficits; Decreased awareness of need for safety;Decreased awareness of need for assistance Functional Mobility and Transfers for ADLs: 
Bed Mobility: 
Supine to Sit: Contact guard assistance(A x 2 for safety) Sit to Supine: Minimum assistance(A x 2 for safety + line management) Scooting: Minimum assistance(Maximal cues for UE proximation to ribcage) Transfers: 
Sit to Stand: Assist x2;Minimum assistance Functional Transfers Toilet Transfer : Moderate assistance;Assist x2; Additional time(sit <> stand transfer for bed pan) Balance: 
Sitting: Impaired; Without support Sitting - Static: Good (unsupported) Sitting - Dynamic: Good (unsupported); Fair (occasional)(Posterior trunk lean with UE and LE exercises) Standing: Impaired; With support Standing - Static: Fair;Constant support Standing - Dynamic : Poor;Constant support ADL Intervention: Toileting Toileting Assistance: Maximum assistance Bowel Hygiene: Maximum assistance Cues: Verbal cues provided;Visual cues provided; Tactile cues provided Cognitive Retraining Safety/Judgement: Decreased insight into deficits; Decreased awareness of need for safety;Decreased awareness of need for assistance Activity Tolerance:  
Fair, desaturates with exertion and requires oxygen, and RR increased with activity (30s) Please refer to the flowsheet for vital signs taken during this treatment. After treatment patient left in no apparent distress:  
Supine in bed, Call bell within reach, and Caregiver / family present COMMUNICATION/COLLABORATION:  
The patients plan of care was discussed with: Physical Therapist and Registered Nurse Shari Freeman Time Calculation: 46 mins

## 2019-12-18 NOTE — PROGRESS NOTES
[facility] [address] [place] [address], [address] GI PROGRESS NOTE Wilton Nicole, [address] office [phone] NP in-hospital cell phone M-F until 4:30 After 5pm or on weekends, please call  for physician on call NAME: Aaron Martinez :  1950 MRN:  [id number] Subjective:  
Feeling fine - tired, no other complaints. Discussed with RN, no signs of GI blood loss, brown BM. Objective: VITALS:  
Last 24hrs VS reviewed since prior progress note. Most recent are: 
Visit Vitals BP 91/72 (BP 1 Location: Left arm, BP Patient Position: At rest) Pulse 80 Temp 98 °F (36.7 °C) Resp 20 Ht 6' 2\" (1.88 m) Wt 84.8 kg (186 lb 15.2 oz) SpO2 97% BMI 24.00 kg/m² PHYSICAL EXAM: 
General: Cooperative, no acute distress   
Neurologic:  Drowsy and oriented X 3. HEENT: EOMI, no scleral icterus Lungs:  CTA bilaterally. No wheezing Heart:  S1 S2, LVAD hum Abdomen: Soft, non-distended, no tenderness. +Bowel sounds Extremities: warm Psych:   Good insight. Not anxious or agitated. Lab Data Reviewed:  
 
Recent Results (from the past 24 hour(s)) HGB & HCT Collection Time: 19 11:02 AM  
Result Value Ref Range HGB 6.2 (L) 12.1 - 17.0 g/dL HCT 20.3 (L) 36.6 - 50.3 % GLUCOSE, POC Collection Time: 19 11:20 AM  
Result Value Ref Range Glucose (POC) 173 (H) 65 - 100 mg/dL Performed by Rubi Terrell, POC Collection Time: 19  4:08 PM  
Result Value Ref Range Glucose (POC) 120 (H) 65 - 100 mg/dL Performed by Rubi Terrell, POC Collection Time: 19  9:27 PM  
Result Value Ref Range Glucose (POC) 139 (H) 65 - 100 mg/dL Performed by Rito Orozco (CON) HGB & HCT Collection Time: 19 10:09 PM  
Result Value Ref Range HGB 7.1 (L) 12.1 - 17.0 g/dL HCT 22.7 (L) 36.6 - 50.3 % METABOLIC PANEL, COMPREHENSIVE  Collection Time: 19  4:06 AM  
 Result Value Ref Range Sodium 133 (L) 136 - 145 mmol/L Potassium 3.6 3.5 - 5.1 mmol/L Chloride 95 (L) 97 - 108 mmol/L  
 CO2 35 (H) 21 - 32 mmol/L Anion gap 3 (L) 5 - 15 mmol/L Glucose 96 65 - 100 mg/dL BUN 41 (H) 6 - 20 MG/DL Creatinine 2.09 (H) 0.70 - 1.30 MG/DL  
 BUN/Creatinine ratio 20 12 - 20 GFR est AA 38 (L) >60 ml/min/1.73m2 GFR est non-AA 32 (L) >60 ml/min/1.73m2 Calcium 8.7 8.5 - 10.1 MG/DL Bilirubin, total 0.6 0.2 - 1.0 MG/DL  
 ALT (SGPT) 11 (L) 12 - 78 U/L  
 AST (SGOT) 14 (L) 15 - 37 U/L Alk. phosphatase 82 45 - 117 U/L Protein, total 6.2 (L) 6.4 - 8.2 g/dL Albumin 2.3 (L) 3.5 - 5.0 g/dL Globulin 3.9 2.0 - 4.0 g/dL A-G Ratio 0.6 (L) 1.1 - 2.2 MAGNESIUM Collection Time: 12/18/19  4:06 AM  
Result Value Ref Range Magnesium 1.9 1.6 - 2.4 mg/dL CBC W/O DIFF Collection Time: 12/18/19  4:06 AM  
Result Value Ref Range WBC 4.9 4.1 - 11.1 K/uL  
 RBC 2.37 (L) 4.10 - 5.70 M/uL HGB 6.8 (L) 12.1 - 17.0 g/dL HCT 21.9 (L) 36.6 - 50.3 % MCV 92.4 80.0 - 99.0 FL  
 MCH 28.7 26.0 - 34.0 PG  
 MCHC 31.1 30.0 - 36.5 g/dL  
 RDW 16.6 (H) 11.5 - 14.5 % PLATELET 902 695 - 268 K/uL MPV 9.8 8.9 - 12.9 FL  
 NRBC 0.0 0  WBC ABSOLUTE NRBC 0.00 0.00 - 0.01 K/uL PROTHROMBIN TIME + INR Collection Time: 12/18/19  4:06 AM  
Result Value Ref Range INR 3.5 (H) 0.9 - 1.1 Prothrombin time 33.5 (H) 9.0 - 11.1 sec PTT Collection Time: 12/18/19  4:06 AM  
Result Value Ref Range aPTT 41.9 (H) 22.1 - 32.0 sec  
 aPTT, therapeutic range     58.0 - 77.0 SECS  
NT-PRO BNP Collection Time: 12/18/19  4:06 AM  
Result Value Ref Range NT pro-BNP 5,789 (H) <125 PG/ML  
DIGOXIN Collection Time: 12/18/19  4:06 AM  
Result Value Ref Range Digoxin level 0.8 (L) 0.90 - 2.00 NG/ML  
LD Collection Time: 12/18/19  4:06 AM  
Result Value Ref Range  85 - 241 U/L  
LACTIC ACID  Collection Time: 12/18/19  4:06 AM  
 Result Value Ref Range Lactic acid 1.3 0.4 - 2.0 MMOL/L  
GLUCOSE, POC Collection Time: 12/18/19  7:09 AM  
Result Value Ref Range Glucose (POC) 138 (H) 65 - 100 mg/dL Performed by Varun Miller (CHAU) HGB & HCT Collection Time: 12/18/19 10:01 AM  
Result Value Ref Range HGB 7.9 (L) 12.1 - 17.0 g/dL HCT 24.9 (L) 36.6 - 50.3 % Assessment: · Anemia: EGD (11/11/19) normal.  Colonoscopy (11/11/19) moderate diverticulosis in the descending and sigmoid colon; otherwise, normal.  Hgb 7.9, platelets 089, INR 3.5. On Coumadin. Hemoccult positive 12/17, negative 12/14. Recurrent gross hematuria - urology following. Right anterior epistaxis - ENT evaluated on 12/15. No signs of active GI bleeding. 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  
Diagnosis Code  Paroxysmal atrial fibrillation (HCC) I48.0  Acute on chronic systolic CHF (congestive heart failure) (HCC) I50.23  Systolic CHF, chronic (HCC) I50.22  
 Peripheral vascular disease (HCC) I73.9  Acute decompensated heart failure (HCC) I50.9 Plan: · PPI · Monitor CBC, transfuse as necessary · Monitor for GI blood loss Signed By: Petty Neal NP   
 12/18/2019  10:23 AM 
  
 
 
I have examined the patient. I have reviewed the chart and agree with the documentation recorded by the NP, including the assessment, treatment plan, and disposition. No evidence of GI bleed No need for endoscopic procedures at this time Will follow Spenser Espinal MD

## 2019-12-18 NOTE — PROGRESS NOTES
ID Progress Note 2019 Subjective: Out of the ICU, now with hematuria and a nosebleed Objective: Antibiotics: 1. Levaquin 2. Rifampin 3. Fluconazole 4. Vancomycin 5. Cefazolin 6. Zosyn Vitals:  
Visit Vitals BP 91/72 (BP 1 Location: Left arm, BP Patient Position: At rest) Pulse 85 Temp 98.5 °F (36.9 °C) Resp 20 Ht 6' 2\" (1.88 m) Wt 84.8 kg (186 lb 15.2 oz) SpO2 97% BMI 24.00 kg/m² Tmax:  Temp (24hrs), Av.8 °F (36.6 °C), Min:97.5 °F (36.4 °C), Max:98.5 °F (36.9 °C) Exam:  Lungs:  clear to auscultation bilaterally Heart:  regular rate and rhythm Abdomen:  soft, non-tender. Bowel sounds normal. No masses,  no organomegaly Labs:     
Recent Labs 19 
1001 19 
0406 19 
2209 19 
1102 19 
0416 19 
0410 WBC  --  4.9  --   --  4.7 4.0* HGB 7.9* 6.8* 7.1* 6.2* 5.8* 6.9*  
PLT  --  158  --   --  171 167 BUN  --  41*  --   --  44* 43* CREA  --  2.09*  --   --  2.13* 2.15* SGOT  --  14*  --   --  14* 14* AP  --  82  --   --  84 87 TBILI  --  0.6  --   --  0.4 0.6 Cultures: No results found for: SDES Lab Results Component Value Date/Time Culture result: NO GROWTH 3 DAYS 12/15/2019 03:37 PM  
 Culture result: NO GROWTH 1 DAY 12/15/2019 03:30 PM  
 Culture result: NO GROWTH 5 DAYS 2019 11:23 PM  
 
 
Radiology:  
 
Line/Insert Date:        
 
Assessment:  
 
1. UTI--Serratia (2 biotypes) from culture and small amount of Enterococcus 2. CHF/cardiomyopathy 3. ?aspiration event(s) 4. Renal insufficiency 5. Respiratory difficulties Objective: 1. Continue current therapy Damion Carlson MD

## 2019-12-18 NOTE — PROGRESS NOTES
[address] [facility] [address] [facility] in [place], [place] Inpatient Progress Note Patient name: Corby David Patient : 1950 Patient MRN: [id number] Attending MD: Devin Eller MD 
Date of service: 19 Chief Complaint:  
Juan Luis Folds azucena LVAD implant 
  
HPI: Sona Kiser is a 71y.o. year old pleasant white male with a history of HTN, HLD, JOSE, CAD s/p cardiac arrest VFib s/p CABG () c/b sternal would infection and sternectomy, ischemic cardiomyopathy LVEF 15-20%, s/p ICD and with LBBB. He was admitted with acute on chronic systolic heart failure with massive volume overload > 20 lbs, in the setting of atrial fibrillation s/p failed DCCV and underwent DCCV and RHC on .  S/p BiVICD on 19 with Dr. Pennie Noriega. He was discharged home home on IV milrinone on 19. Mathew Boast has been followed closely by Dr. Jhonatan Meyers and the [facility]. 
  
Mr. Kiser was admitted for acute on chronic systolic heart failure. He underwent implantation of Impella 5.0 due to CS and has completed his LVAD evaluation. Mathew Boast meets criteria for implant of HM3 as DT. He was NYHA Class IV prior to implant of Impella 5.0, has LVEF < 15%, was intolerant of GDMT due to symptomatic hypotension and renal dysfunction. He remains dependent on temporary MCS support. RHC  revealed compensated hemodynamics on Impella. His renal function has improved dramatically with improvement in his hemodynamics. He is not a suitable transplant candidate due to single kidney, sternectomy, debility, and frailty. He was reviewed by Aneta Nicole and felt to be a high risk heart transplant candidate due to multiple co-morbidities as well as the afore mentioned conditions.  He remained in the CCU and underwent a HM 3 implant as DT on . He was weaned off of pressors and transferred to [place] [address] where he continues to undergo PT/OT and hemodynamic optimization.   
  
24Hr Events Continues to have hematuria CBI started Anticoagulation held Procedure:  Procedure(s): REMOVAL TEMPORARY LEFT VENTRICULAR ASSIST DEVICE, IMPLANTATION OF LEFT VENTRICULAR ASSIST DEVICE PERMANENT (VAD), ECC, JACQUE, EPI AORTIC US BY DR Skyla Grant.    
POD:  29 
  
Impression / Plan:  
Heart Failure Status: NYHA Class IV Chronic systolic heart failure due to ICM, NYHA Class IV, EF < 15%, cardiogenic shock bridged with Impella 5.0 support to HM 3 implant S/p Impella removal and LVAD implant 11/18/19 RPMs 6400 rpm  
TTE with bubble study negative for PFO Cont milrinone 0.3 mcg/kg/min Obtain daily CardioMEMs readings when in stepdown unit Continue Bumex 2 mg IV BID Cont Sildenafil to 40 mg PO TID - rx sent to local pharmacy to initiate PA Intolerant of BB due to RV failure Intolerant of ACEi/ARB/ARNI/AA due to JUAN J on CKD 3 Strict I/O, daily weights, Na+ restricted diet Continue LVAD education Daily dressing changes until POD 30 TTE 12/16- EF 15-20% QTc 374 12/18/19 Generalized myoclonus Improved Appreciate Neurology input Continue Keppra with renal dosing Head CT negative for acute process EEG suggestive of mild generalized encephalopathic process, possibly related to underlying structural brain injury vs metabolic abnormality Monitor closely  
  
Anticoagulation for LVAD, INR goal 2-3 Hold ASA d/t hematuria INR 3.5 Hold coumadin tonight-d/w Dr. Carmenza Aly Vit K today Epistaxis Resolved Afrin soaked guaze ENT consult appreciated Monitor for now  
  
Acute Respiratory Failure post op Improved with aggressive diuresis, but still c/o dyspnea CXR and O2 requirement improved ABG acceptable TTE with Bubble study negative for PFO Pulmonary Consult appreciated Pulmonary hygiene Cont home CPAP- must use while sleeping  
 milrinone dose to 0.3 mcgs/kg/min Acute on CKD 3, atrophic left kidney Appreciate Nephrology assistance Watch Cr - improving Daily diuretic dosing Avoid nephrotoxins, trend labs Renally dose meds  
  
CAD s/p CABG Cont low dose ASA- watch platelets No BB d/t RV failure Hold statin due to recent hepatic failure LHC performed 11/13 - low likelihood of benefit from revascularization  
  
Hx of VF arrest s/p BiV-ICD No recent shocks Keep K > 4 and Mg > 2  
   
PAF Dig level 0.9 -cont dig (62.5 mcg qMWF) No BB due to RV failure Repeat TFTs WNL 
  
Hx of gout Uric acid WNL Acute blood loss anemia Likely due to hematuria Cont octreotide 25 mcg SQ TID Transfuse to keep Hgb > 7.5 Fecal occult 12/14 negative, positive on 12/17 Transfuse today Appreciate urology recommendations Cont CBI GI following, low suspicion for GIB 
  
Suspected aspiration pneumonia Sputum culture showing scant growth of yeast 
Cefepime until 12/19 Urinary retention, c/b serratia UTI and hematuria Appreciate Urology consult - asked to see again due to new hematuria Repeat UA with cx  
 cefepime Cystoscopy performed 11/13 shows catheter induced cystitis Sepsis Alert Paired Blood NGTD Urine cx negative Sputum cx when able Malnutrition Appreciate Nutritionist consult Prealbumin weekly - 13 on 12/16 Diet as tolerated Intolerant of mirtazapine d/t tremors, confusion Cont low dose Marinol  
 
  
COPD Appreciate Pulmonology assistance Continue nebs PRN   
  
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  
  
Hx of sternal wound infection, sternectomy Sternum closed post op- monitor  
  
Vocal cord paralysis Improved ENT recs appreciated Neck CT- R neck edema, no airway compromise Speech therapy following - appreciate input Anxiety Klonipin 0.5 mg TID prn anxiety 
  
Debility Continue PT/OT  
  
Ppx Protonix PT/OT Bowel regimen PICC placed 11/22/19  
  
Dispo: Will need IP rehab. Not ready for discharge at this time Patient seen and examined. Data and note reviewed. I have discussed and agree with the plans as noted. 71year old male with a history of ICM s/p LVAD as DT whose course has been complicated by RV dysfunction, respiratory failure, and acute blood loss anemia due to epistaxis and hemorraghic cystitis. He received prbc transfusion today - await further recommendations from urology. Thank you for allowing us to participate in your patient's care. Mounika Rojo MD, Marilee Slade Chief of Cardiology, [facility] Medical Director Calos Rueda [address], [address] [facility], [address] [place] [address] Office [phone] Fax [phone] LVAD INTERROGATION: 
Device interrogated in person Device function normal, normal flow, no PI events LVAD Pump Speed (RPM): 6400 Pump Flow (LPM): 5.8 MAP: 69 
PI (Pulsitility Index): 3.9 Power: 5.6  Test: No 
Back Up  at Bedside & Labeled: Yes Power Module Test: No 
Driveline Site Care: Yes Driveline Dressing: Clean, Dry, and Intact Outpatient: No 
MAP in Therapeutic Range (Outpatient): Yes Testing Alarms Reviewed: Yes 
Back up SC speed: 6400 Back up Low Speed Limit: 6000 Emergency Equipment with Patient?: Yes Emergency procedures reviewed?: Yes Drive line site inspected?: Yes Drive line intergrity inspected?: Yes Drive line dressing changed?: No 
 
PHYSICAL EXAM: 
Visit Vitals BP 91/72 (BP 1 Location: Left arm, BP Patient Position: At rest) Pulse 82 Temp 97.7 °F (36.5 °C) Resp 20 Ht 6' 2\" (1.88 m) Wt 186 lb 15.2 oz (84.8 kg) SpO2 97% BMI 24.00 kg/m² Physical Exam  
Constitutional: He is oriented to person, place, and time. He appears well-developed. He appears cachectic. No distress. More fatigued today HENT:  
Head: Normocephalic. Neck: Normal range of motion. Neck supple. No JVD present. Cardiovascular: Normal rate, regular rhythm and normal heart sounds. + VAD hum Pulmonary/Chest: Effort normal and breath sounds normal. No respiratory distress. Abdominal: Soft. Bowel sounds are normal. He exhibits no distension. Dobhoff in place Genitourinary:    Genitourinary Comments: Hematuria Musculoskeletal: Normal range of motion. General: No edema. Neurological: He is alert and oriented to person, place, and time. Skin: Skin is warm and dry. Nursing note and vitals reviewed. REVIEW OF SYSTEMS: 
Review of Systems Constitutional: Positive for malaise/fatigue. Negative for chills and fever. HENT: Positive for hearing loss. Negative for nosebleeds. Respiratory: Negative for cough and shortness of breath. Mild dyspnea Cardiovascular: Negative for chest pain, palpitations, orthopnea and leg swelling. Gastrointestinal: Negative for heartburn, nausea and vomiting. Genitourinary: Positive for hematuria. Musculoskeletal: Negative. Neurological: Positive for weakness. Negative for dizziness and headaches. Endo/Heme/Allergies: Bruises/bleeds easily. PAST MEDICAL HISTORY: 
Past Medical History:  
Diagnosis Date  Degenerative disc disease, lumbar  Heart failure ([address]  High cholesterol  Hypertension  Paroxysmal atrial fibrillation ([facility] [address] 4/2/2019  Spinal stenosis PAST SURGICAL HISTORY: 
Past Surgical History:  
Procedure Laterality Date  COLONOSCOPY Left 11/11/2019 COLONOSCOPY at bedside performed by Mari Hernández MD at [address] [facility]  HX CORONARY ARTERY BYPASS GRAFT    
 triple  HX HERNIA REPAIR    
 HX IMPLANTABLE CARDIOVERTER DEFIBRILLATOR  LA CARDIOVERSION ELECTIVE ARRHYTHMIA EXTERNAL N/A 6/10/2019 EP CARDIOVERSION [place] [address], [facility] CATH LAB  LA CARDIOVERSION ELECTIVE ARRHYTHMIA EXTERNAL N/A 6/18/2019 EP CARDIOVERSION performed by Dipesh Villalba MD at [address] CATH LAB  KS INSJ/RPLCMT PERM DFB W/TRNSVNS LDS 1/DUAL CHMBR N/A 2019 INSERT ICD BIV MULTI performed by Dipesh Segovia MD at [address] CATH LAB  [place] TCAT IMPL WRLS P-ART PRS SNR L-T HEMODYN MNTR N/A 2019 IMPLANT HEART FAILURE MONITORING DEVICE performed by Eder Murphy MD at [address], [facility] CATH LAB FAMILY HISTORY: 
Family History Problem Relation Age of Onset  Lung Disease Mother  Hypertension Mother Louis Colby Arthritis-osteo Mother  Heart Disease Mother  Heart Disease Father  Diabetes Father SOCIAL HISTORY: 
Social History Socioeconomic History  Marital status:  Spouse name: Not on file  Number of children: Not on file  Years of education: Not on file  Highest education level: Not on file Tobacco Use  Smoking status: Former Smoker Last attempt to quit: 2010 Years since quittin.0  Smokeless tobacco: Never Used Substance and Sexual Activity  Alcohol use: Not Currently Comment: rarely  Drug use: Never Other Topics Concern LABORATORY RESULTS: 
  
Labs Latest Ref Rng & Units 2019 2019 2019 2019 2019 2019 12/15/2019 WBC 4.1 - 11.1 K/uL - 4.9 - - 4.7 4. 0(L) -  
RBC 4.10 - 5.70 M/uL - 2.37(L) - - 1.98(L) 2.35(L) - Hemoglobin 12.1 - 17.0 g/dL 7. 9(L) 6. 8(L) 7. 1(L) 6. 2(L) 5. 8(LL) 6. 9(L) 7. 8(L) Hematocrit 36.6 - 50.3 % 24. 9(L) 21. 9(L) 22. 7(L) 20. 3(L) 18. 8(L) 22. 3(L) 25. 4(L) MCV 80.0 - 99.0 FL - 92.4 - - 94.9 94.9 - Platelets 394 - 302 K/uL - 158 - - 171 167 - Lymphocytes 12 - 49 % - - - - - - - Monocytes 5 - 13 % - - - - - - - Eosinophils 0 - 7 % - - - - - - - Basophils 0 - 1 % - - - - - - - Albumin 3.5 - 5.0 g/dL - 2. 3(L) - - 2. 2(L) 2. 3(L) -  
Calcium 8.5 - 10.1 MG/DL - 8.7 - - 8.7 8.8 - SGOT 15 - 37 U/L - 14(L) - - 14(L) 14(L) -  
 Glucose 65 - 100 mg/dL - 96 - - 117(H) 103(H) -  
BUN 6 - 20 MG/DL - 41(H) - - 44(H) 43(H) -  
Creatinine 0.70 - 1.30 MG/DL - 2.09(H) - - 2.13(H) 2.15(H) - Sodium 136 - 145 mmol/L - 133(L) - - 136 138 - Potassium 3.5 - 5.1 mmol/L - 3.6 - - 3.6 3.5 -  
TSH 0.36 - 3.74 uIU/mL - - - - - - -  
PSA 0.01 - 4.0 ng/mL - - - - - - -  
LDH 85 - 241 U/L - 204 - - 209 214 -  
CEA ng/mL - - - - - - - Some recent data might be hidden Lab Results Component Value Date/Time TSH 2.30 12/03/2019 03:29 AM  
 TSH 2.40 10/25/2019 07:39 PM  
 TSH 2.45 06/01/2019 04:16 AM  
 
 
ALLERGY: 
No Known Allergies CURRENT MEDICATIONS: 
Current Facility-Administered Medications Medication Dose Route Frequency  phytonadione (VITAMIN K) 1 mg/mL oral solution 2.5 mg  2.5 mg Oral NOW  
 [START ON 12/19/2019] potassium chloride SR (KLOR-CON 10) tablet 40 mEq  40 mEq Oral DAILY  alteplase (CATHFLO) 1 mg in sterile water (preservative free) 1 mL injection  1 mg InterCATHeter PRN  
 0.9% sodium chloride infusion 250 mL  250 mL IntraVENous PRN  finasteride (PROSCAR) tablet 5 mg  5 mg Oral DAILY  dronabinol (MARINOL) capsule 2.5 mg  2.5 mg Oral DAILY  0.9% sodium chloride infusion 250 mL  250 mL IntraVENous PRN  
 0.9% sodium chloride infusion 250 mL  250 mL IntraVENous PRN  
 albuterol-ipratropium (DUO-NEB) 2.5 MG-0.5 MG/3 ML  3 mL Nebulization Q6H RT  
 oxymetazoline (AFRIN) 0.05 % nasal spray 2 Spray  2 Spray Both Nostrils BID  spironolactone (ALDACTONE) tablet 25 mg  25 mg Oral DAILY  clonazePAM (KlonoPIN) tablet 0.5 mg  0.5 mg Oral TID PRN  
 milrinone (PRIMACOR) 20 MG/100 ML D5W infusion  0.3 mcg/kg/min IntraVENous CONTINUOUS  
 sildenafil (pulm.hypertension) (REVATIO) tablet 40 mg  40 mg Oral TID  bumetanide (BUMEX) injection 2 mg  2 mg IntraVENous BID  magnesium oxide (MAG-OX) tablet 400 mg  400 mg Oral BID  diphenhydrAMINE (BENADRYL) capsule 25 mg  25 mg Oral QHS PRN  
  octreotide (SANDOSTATIN) injection 25 mcg  25 mcg SubCUTAneous TID  benzocaine-menthol (CEPACOL) lozenge 1 Lozenge  1 Lozenge Mucous Membrane PRN  
 cefepime (MAXIPIME) 2 g in 0.9% sodium chloride (MBP/ADV) 100 mL  2 g IntraVENous Q24H  
 digoxin (LANOXIN) tablet 0.0625 mg  62.5 mcg Oral Q MON, WED & FRI  levETIRAcetam (KEPPRA) oral solution 250 mg  250 mg Oral Q12H  pantoprazole (PROTONIX) tablet 40 mg  40 mg Oral ACB  [Held by provider] aspirin chewable tablet 81 mg  81 mg Oral DAILY  0.9% sodium chloride infusion  3 mL/hr IntraVENous CONTINUOUS  
 allopurinol (ZYLOPRIM) tablet 100 mg  100 mg Oral DAILY  arformoterol (BROVANA) neb solution 15 mcg  15 mcg Nebulization BID RT And  
 budesonide (PULMICORT) 500 mcg/2 ml nebulizer suspension  500 mcg Nebulization BID RT  
 artificial saliva (MOUTH KOTE) 1 Spray  1 Spray Oral PRN  
 lactobac ac& pc-s.therm-b.anim (TIAN Q/RISAQUAD)  1 Cap Oral DAILY  oxyCODONE IR (ROXICODONE) tablet 5 mg  5 mg Oral Q6H PRN  
 balsam peru-castor oil (VENELEX) ointment   Topical TID  tamsulosin (FLOMAX) capsule 0.4 mg  0.4 mg Oral DAILY  insulin lispro (HUMALOG) injection   SubCUTAneous AC&HS  Warfarin MD/NP dosing   Other PRN  
 epoetin yvonne-epbx (RETACRIT) injection 20,000 Units  20,000 Units SubCUTAneous Q7D  
 sodium chloride (NS) flush 5-40 mL  5-40 mL IntraVENous Q8H  
 sodium chloride (NS) flush 5-40 mL  5-40 mL IntraVENous PRN  
 acetaminophen (TYLENOL) tablet 650 mg  650 mg Oral Q6H PRN  
 naloxone (NARCAN) injection 0.4 mg  0.4 mg IntraVENous PRN  
 ondansetron (ZOFRAN) injection 4 mg  4 mg IntraVENous Q4H PRN  
 senna-docusate (PERICOLACE) 8.6-50 mg per tablet 1 Tab  1 Tab Oral DAILY  bisacodyl (DULCOLAX) tablet 5 mg  5 mg Oral DAILY PRN  
 sucralfate (CARAFATE) tablet 1 g  1 g Oral AC&HS  influenza vaccine 2019-20 (6 mos+)(PF) (FLUARIX/FLULAVAL/FLUZONE QUAD) injection 0.5 mL  0.5 mL IntraMUSCular PRIOR TO DISCHARGE  
  hydrALAZINE (APRESOLINE) 20 mg/mL injection 20 mg  20 mg IntraVENous Q6H PRN  
 dextrose 10 % infusion 125-250 mL  125-250 mL IntraVENous PRN Thank you for allowing me to participate in this patient's care. TIERRA Gaona [address], [address] Phone: [phone] Fax: [phone]

## 2019-12-19 NOTE — PROGRESS NOTES
Problem: Infection - Risk of, Urinary Catheter-Associated Urinary Tract Infection Goal: *Absence of infection signs and symptoms 12/19/2019 1108 by Nelai Otoole Outcome: Progressing Towards Goal 
12/19/2019 1108 by Nelia Otoole Reactivated Daily neal care. Using sterile gloves for irrigation and new equipment for each irrigation. Good handwashing. Monitoring for signs and symptoms of UTI.

## 2019-12-19 NOTE — PROGRESS NOTES
[facility] 
 [address] [place], [address] [facility] Phone: [phone]   [phone]   
  
Nephrology Progress Note Sona Kiser     1950     [id number] Date of Admission : 10/25/2019 
12/19/19 CC:  Follow up for JUAN J, CKD, Hyponatremia Assessment and Plan JUAN J on CKD: 
- developing alkalosis  
- changed Bumex to p.o  
- continue aldactone - watch serum Na  -- could be related to CBI  
- consider 1 unit PRBC today Gross hematuria, BPH w/ retention: 
- off CBI pre VAD. cysto pre op showed catheter related Cystitis @ postr wall  
- CBI for recurrent hematuria - appreciate urology help Hypokalemia  
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  
- unable to get CTA/MRA 
- On Keppra Acute on Chronic HFrEF  
- EF 16-20%, NYHA Class IV , hx of VF arrest - s/p AICD 
- Impella insertion 10/29- removed 11/18  
- s/p LVAD placement on 11/18 
- s/p right thoracentesis. CT in place Hoarseness, vocal cord paralysis, mediastinal adenopathy: 
- will need eventual PET/CT 
  
L arm clot s/p thrombectomy on 11/25 JOSE on CPAP  
  
PAF s/p DCCV 6/19 
  
Anemia: 
- from blood loss s/p multiple blood products - s/p IV iron,  Repeat iron studies ok 
- on PAVEL q7 d Serratia and Enteroccocus UTI: 
- completed abx Interval History:   
Seen and examined He reports bladder pain w/ hand irrigation Urine clearing Hb down to 7 SOB - mild Review of Systems: as per HPI Current Medications:  
Current Facility-Administered Medications Medication Dose Route Frequency  bumetanide (BUMEX) tablet 2 mg  2 mg Oral BID  potassium chloride SR (KLOR-CON 10) tablet 40 mEq  40 mEq Oral DAILY  alteplase (CATHFLO) 1 mg in sterile water (preservative free) 1 mL injection  1 mg InterCATHeter PRN  
 0.9% sodium chloride infusion 250 mL  250 mL IntraVENous PRN  finasteride (PROSCAR) tablet 5 mg  5 mg Oral DAILY  dronabinol (MARINOL) capsule 2.5 mg  2.5 mg Oral DAILY  0.9% sodium chloride infusion 250 mL  250 mL IntraVENous PRN  
 0.9% sodium chloride infusion 250 mL  250 mL IntraVENous PRN  
 albuterol-ipratropium (DUO-NEB) 2.5 MG-0.5 MG/3 ML  3 mL Nebulization Q6H RT  
 oxymetazoline (AFRIN) 0.05 % nasal spray 2 Spray  2 Spray Both Nostrils BID  spironolactone (ALDACTONE) tablet 25 mg  25 mg Oral DAILY  clonazePAM (KlonoPIN) tablet 0.5 mg  0.5 mg Oral TID PRN  
 milrinone (PRIMACOR) 20 MG/100 ML D5W infusion  0.3 mcg/kg/min IntraVENous CONTINUOUS  
 sildenafil (pulm.hypertension) (REVATIO) tablet 40 mg  40 mg Oral TID  magnesium oxide (MAG-OX) tablet 400 mg  400 mg Oral BID  diphenhydrAMINE (BENADRYL) capsule 25 mg  25 mg Oral QHS PRN  
 octreotide (SANDOSTATIN) injection 25 mcg  25 mcg SubCUTAneous TID  benzocaine-menthol (CEPACOL) lozenge 1 Lozenge  1 Lozenge Mucous Membrane PRN  
 cefepime (MAXIPIME) 2 g in 0.9% sodium chloride (MBP/ADV) 100 mL  2 g IntraVENous Q24H  
 digoxin (LANOXIN) tablet 0.0625 mg  62.5 mcg Oral Q MON, WED & FRI  levETIRAcetam (KEPPRA) oral solution 250 mg  250 mg Oral Q12H  pantoprazole (PROTONIX) tablet 40 mg  40 mg Oral ACB  
 0.9% sodium chloride infusion  3 mL/hr IntraVENous CONTINUOUS  
 allopurinol (ZYLOPRIM) tablet 100 mg  100 mg Oral DAILY  arformoterol (BROVANA) neb solution 15 mcg  15 mcg Nebulization BID RT And  
 budesonide (PULMICORT) 500 mcg/2 ml nebulizer suspension  500 mcg Nebulization BID RT  
 artificial saliva (MOUTH KOTE) 1 Spray  1 Spray Oral PRN  
 lactobac ac& pc-s.therm-b.anim (TIAN Q/RISAQUAD)  1 Cap Oral DAILY  oxyCODONE IR (ROXICODONE) tablet 5 mg  5 mg Oral Q6H PRN  
 balsam peru-castor oil (VENELEX) ointment   Topical TID  tamsulosin (FLOMAX) capsule 0.4 mg  0.4 mg Oral DAILY  insulin lispro (HUMALOG) injection   SubCUTAneous AC&HS  Warfarin MD/NP dosing   Other PRN  
  epoetin yvonne-epbx (RETACRIT) injection 20,000 Units  20,000 Units SubCUTAneous Q7D  
 sodium chloride (NS) flush 5-40 mL  5-40 mL IntraVENous Q8H  
 sodium chloride (NS) flush 5-40 mL  5-40 mL IntraVENous PRN  
 acetaminophen (TYLENOL) tablet 650 mg  650 mg Oral Q6H PRN  
 naloxone (NARCAN) injection 0.4 mg  0.4 mg IntraVENous PRN  
 ondansetron (ZOFRAN) injection 4 mg  4 mg IntraVENous Q4H PRN  
 senna-docusate (PERICOLACE) 8.6-50 mg per tablet 1 Tab  1 Tab Oral DAILY  bisacodyl (DULCOLAX) tablet 5 mg  5 mg Oral DAILY PRN  
 sucralfate (CARAFATE) tablet 1 g  1 g Oral AC&HS  influenza vaccine 2019-20 (6 mos+)(PF) (FLUARIX/FLULAVAL/FLUZONE QUAD) injection 0.5 mL  0.5 mL IntraMUSCular PRIOR TO DISCHARGE  hydrALAZINE (APRESOLINE) 20 mg/mL injection 20 mg  20 mg IntraVENous Q6H PRN  
 dextrose 10 % infusion 125-250 mL  125-250 mL IntraVENous PRN No Known Allergies Objective: 
Vitals:   
Vitals:  
 12/18/19 1947 12/18/19 2125 12/18/19 2311 12/19/19 0304 BP:      
Pulse: 87  81 81 Resp: 18  20 21 Temp: 98.3 °F (36.8 °C)  98 °F (36.7 °C) 98.1 °F (36.7 °C) SpO2: 99% 100% 98% 100% Weight:      
Height:      
 
Intake and Output: 
12/19 0701 - 12/19 1900 In: 3000 Out: 5600  
12/17 1901 - 12/19 0700 In: 087690.5 [P.O.:1085; I.V.:233] Out: [id number] Physical Examination: 
 
General: Elderly man in no acute distress HEENT:            P[place] Neck:  No lines Resp:  CTA 
CV:  VAD sounds; No LE edema GI:  Soft and non-tender; no distension Neurologic:  Alert and appropriate; normal speech :  + neal; gross hematuria [x]    High complexity decision making was performed 
[x]    Patient is at high-risk of decompensation with multiple organ involvement Lab Data Personally Reviewed: I have reviewed all the pertinent labs, microbiology data and radiology studies during assessment. Recent Labs 12/19/19 
0236 12/18/19 
0406 12/17/19 
0317 * 133* 136  
K 3.7 3.6 3.6 CL 94* 95* 98  
CO2 36* 35* 33* * 96 117* BUN 40* 41* 44* CREA 2.01* 2.09* 2.13* CA 8.7 8.7 8.7 MG 1.9 1.9 2.1 ALB 2.2* 2.3* 2.2*  
SGOT 16 14* 14* ALT 11* 11* 12 INR 2.1* 3.5* 2.9* Recent Labs 12/19/19 
0236 12/18/19 
1836 12/18/19 
1001 12/18/19 
0406 12/17/19 
2209  12/17/19 
5190 WBC 4.9  --   --  4.9  --   --  4.7 HGB 7.0* 7.3* 7.9* 6.8* 7.1*   < > 5.8* HCT 22.2* 23.2* 24.9* 21.9* 22.7*   < > 18.8*  
  --   --  158  --   --  171  
 < > = values in this interval not displayed. No results found for: SDES Lab Results Component Value Date/Time Culture result: NO GROWTH 4 DAYS 12/15/2019 03:37 PM  
 Culture result: NO GROWTH 1 DAY 12/15/2019 03:30 PM  
 Culture result: NO GROWTH 5 DAYS 12/06/2019 11:23 PM  
 Culture result: HEAVY ENTEROCOCCUS SPECIES NOTED (A) 11/27/2019 11:10 AM  
 Culture result: LIGHT YEAST (A) 11/27/2019 11:10 AM  
 
Recent Results (from the past 24 hour(s)) HGB & HCT Collection Time: 12/18/19 10:01 AM  
Result Value Ref Range HGB 7.9 (L) 12.1 - 17.0 g/dL HCT 24.9 (L) 36.6 - 50.3 % PROCALCITONIN Collection Time: 12/18/19 10:03 AM  
Result Value Ref Range Procalcitonin 0.08 ng/mL GLUCOSE, POC Collection Time: 12/18/19 11:11 AM  
Result Value Ref Range Glucose (POC) 143 (H) 65 - 100 mg/dL Performed by Sarah Stoll GLUCOSE, POC Collection Time: 12/18/19  4:46 PM  
Result Value Ref Range Glucose (POC) 131 (H) 65 - 100 mg/dL Performed by Tiara Lagos   
HGB & HCT Collection Time: 12/18/19  6:36 PM  
Result Value Ref Range HGB 7.3 (L) 12.1 - 17.0 g/dL HCT 23.2 (L) 36.6 - 50.3 % GLUCOSE, POC Collection Time: 12/18/19  9:02 PM  
Result Value Ref Range Glucose (POC) 135 (H) 65 - 100 mg/dL Performed by XAVI ARAMBULA   
PROCALCITONIN Collection Time: 12/19/19  2:36 AM  
Result Value Ref Range Procalcitonin 0.16 ng/mL LACTIC ACID  Collection Time: 12/19/19  2:36 AM  
 Result Value Ref Range Lactic acid 1.0 0.4 - 2.0 MMOL/L  
DIGOXIN Collection Time: 12/19/19  2:36 AM  
Result Value Ref Range Digoxin level 0.9 0.90 - 2.00 NG/ML  
LD Collection Time: 12/19/19  2:36 AM  
Result Value Ref Range  85 - 241 U/L  
NT-PRO BNP Collection Time: 12/19/19  2:36 AM  
Result Value Ref Range NT pro-BNP 3,694 (H) <741 PG/ML  
METABOLIC PANEL, COMPREHENSIVE Collection Time: 12/19/19  2:36 AM  
Result Value Ref Range Sodium 134 (L) 136 - 145 mmol/L Potassium 3.7 3.5 - 5.1 mmol/L Chloride 94 (L) 97 - 108 mmol/L  
 CO2 36 (H) 21 - 32 mmol/L Anion gap 4 (L) 5 - 15 mmol/L Glucose 142 (H) 65 - 100 mg/dL BUN 40 (H) 6 - 20 MG/DL Creatinine 2.01 (H) 0.70 - 1.30 MG/DL  
 BUN/Creatinine ratio 20 12 - 20 GFR est AA 40 (L) >60 ml/min/1.73m2 GFR est non-AA 33 (L) >60 ml/min/1.73m2 Calcium 8.7 8.5 - 10.1 MG/DL Bilirubin, total 0.7 0.2 - 1.0 MG/DL  
 ALT (SGPT) 11 (L) 12 - 78 U/L  
 AST (SGOT) 16 15 - 37 U/L Alk. phosphatase 101 45 - 117 U/L Protein, total 6.1 (L) 6.4 - 8.2 g/dL Albumin 2.2 (L) 3.5 - 5.0 g/dL Globulin 3.9 2.0 - 4.0 g/dL A-G Ratio 0.6 (L) 1.1 - 2.2 MAGNESIUM Collection Time: 12/19/19  2:36 AM  
Result Value Ref Range Magnesium 1.9 1.6 - 2.4 mg/dL CBC W/O DIFF Collection Time: 12/19/19  2:36 AM  
Result Value Ref Range WBC 4.9 4.1 - 11.1 K/uL  
 RBC 2.43 (L) 4.10 - 5.70 M/uL HGB 7.0 (L) 12.1 - 17.0 g/dL HCT 22.2 (L) 36.6 - 50.3 % MCV 91.4 80.0 - 99.0 FL  
 MCH 28.8 26.0 - 34.0 PG  
 MCHC 31.5 30.0 - 36.5 g/dL  
 RDW 16.8 (H) 11.5 - 14.5 % PLATELET 925 384 - 917 K/uL MPV 9.5 8.9 - 12.9 FL  
 NRBC 0.0 0  WBC ABSOLUTE NRBC 0.00 0.00 - 0.01 K/uL PROTHROMBIN TIME + INR Collection Time: 12/19/19  2:36 AM  
Result Value Ref Range INR 2.1 (H) 0.9 - 1.1 Prothrombin time 20.1 (H) 9.0 - 11.1 sec PTT Collection Time: 12/19/19  2:36 AM  
Result Value Ref Range aPTT 37.9 (H) 22.1 - 32.0 sec  
 aPTT, therapeutic range     58.0 - 77.0 SECS  
TYPE & SCREEN Collection Time: 12/19/19  2:36 AM  
Result Value Ref Range Crossmatch Expiration 12/22/2019 ABO/Rh(D) O POSITIVE Antibody screen NEG Comment Previously identified nonspecific and nonspecific cold antibodies ROYER Poly POS   
 ROYER IgG POS   
 ROYER C3b/C3d NEG Unit number [id number] Blood component type  LR,2 Unit division 00 Status of unit ALLOCATED Crossmatch result Compatible Unit number [id number] Blood component type  LR Unit division 00 Status of unit ALLOCATED Crossmatch result Compatible GLUCOSE, POC Collection Time: 12/19/19  7:21 AM  
Result Value Ref Range Glucose (POC) 122 (H) 65 - 100 mg/dL Performed by Arden Reid MD

## 2019-12-19 NOTE — PROGRESS NOTES
Bedside shift change report given to Crystal (oncoming nurse) by Marcos Landeros (offgoing nurse). Report included the following information SBAR, Intake/Output, MAR, Accordion, Recent Results, Med Rec Status and Cardiac Rhythm Paced. 2010: Catheter irrigated with 1000ml sterile water using aseptic technique with return of 1000ml clear red urine containing two moderately sized clots 0000: Cathflo instilled into white port of PICC due to sluggishness and lack of blood return 0030: Catheter irrigated with 500ml sterile water using aseptic technique, returned 500ml clear red urine with several small clots visible 0225: Cathflo removed from white port with good blood return restored; blood drawn and cap ends changed 0500: Daily CHG bath performed 0515: Catheter irrigated with 500ml sterile water using aseptic technique; returned 500ml clear red urine with 3 visible clots 0800: Bedside shift change report given to Junior Greenberg (oncoming nurse) by Ruby Cranker (offgoing nurse). Report included the following information SBAR, Intake/Output, MAR, Accordion, Recent Results, Med Rec Status and Cardiac Rhythm Paced.

## 2019-12-19 NOTE — PROGRESS NOTES
Problem: Self Care Deficits Care Plan (Adult) Goal: *Acute Goals and Plan of Care (Insert Text) Description FUNCTIONAL STATUS PRIOR TO ADMISSION: Patient was modified independent using a single point cane for functional mobility. Patient able to shower and dress himself. However, patient required assistance for household management from his wife. HOME SUPPORT: The patient lived alone with wife to provide assistance. Occupational Therapy Goals: 
 
Goals reviewed and continued 12/19/2019 OT weekly reassessment 12/6/19: goals modified 1. Patient will perform upper body dressing moderate assistance within 7 day(s). 2.  Patient will perform lower body dressing with moderate assistance within 7 day(s). 3.  Patient will perform grooming seated EOB with minimum assistance within 7 day(s). 4.  Patient will perform toilet transfers with minimum assistance within 7 day(s). 5.  Patient will perform all aspects of toileting with moderate assistance within 7 day(s). 6.  Patient will participate in upper extremity therapeutic exercise/activities with supervision/set-up-min A for 5 minutes within 7 day(s). 7.  Patient will utilize energy conservation techniques during functional activities with verbal cues within 7 day(s). 8. Patient will verbalize 3/5 LVAD components with min verbal cues within 7 day (s). OT weekly reassessment 11/29/19: goals modified below 1. Patient will perform upper body dressing including LVAD switchovers with moderate assistance within 7 day(s). 2.  Patient will perform lower body dressing with minimal assistance within 7 day(s). 3.  Patient will perform grooming in standing with minimum assistance within 7 day(s). 4.  Patient will perform toilet transfers with minimum assistance within 7 day(s). 5.  Patient will perform all aspects of toileting with minimal assistance within 7 day(s).  
6.  Patient will participate in upper extremity therapeutic exercise/activities with supervision/set-up for 5 minutes within 7 day(s). 7.  Patient will utilize energy conservation techniques during functional activities with verbal cues within 7 day(s). 8. Patient will verbalize LVAD terminology with verbal cues within 7 day (s). Goals reviewed and continued/modified as follows 11/20/19 s/p LVAD implantation 1. Patient will perform upper body dressing including LVAD switchovers with moderate assistance within 7 day(s). 2.  Patient will perform lower body dressing with minimal assistance within 7 day(s). 3.  Patient will perform grooming with supervision/setup within 7 day(s). 4.  Patient will perform toilet transfers supervision/setupmodified independence within 7 day(s). 5.  Patient will perform all aspects of toileting with minimal assistance within 7 day(s). 6.  Patient will participate in upper extremity therapeutic exercise/activities with supervision/set-up for 5 minutes within 7 day(s). 7.  Patient will utilize energy conservation techniques during functional activities with verbal cues within 7 day(s). 8. Patient will verbalize LVAD terminology with verbal cues within 7 day (s). Goals reviewed and continued/modified as follows 11/12/19 1. Patient will perform bathing with supervision/set-up within 7 day(s). 2.  Patient will perform lower body dressing with supervision/set-up within 7 day(s). 3.  Patient will perform grooming with modified independence within 7 day(s). 4.  Patient will perform toilet transfers with modified independence within 7 day(s). 5.  Patient will perform all aspects of toileting with supervision/set-up within 7 day(s). 6.  Patient will participate in upper extremity therapeutic exercise/activities with supervision/set-up for 5 minutes within 7 day(s). 7.  Patient will utilize energy conservation techniques during functional activities with verbal cues within 7 day(s). 8. Patient will verbalize LVAD terminology with verbal cues within 7 day (s) in preparation for implant. Continue all goals 10/30/19 post re-eval for Impella removal  
Initiated 10/28/2019 1. Patient will perform bathing with supervision/set-up within 7 day(s). 2.  Patient will perform lower body dressing with supervision/set-up within 7 day(s). 3.  Patient will perform grooming with modified independence within 7 day(s). 4.  Patient will perform toilet transfers with modified independence within 7 day(s). 5.  Patient will perform all aspects of toileting with supervision/set-up within 7 day(s). 6.  Patient will participate in upper extremity therapeutic exercise/activities with supervision/set-up for 5 minutes within 7 day(s). 7.  Patient will utilize energy conservation techniques during functional activities with verbal cues within 7 day(s). Outcome: Progressing Towards Goal 
 OCCUPATIONAL THERAPY TREATMENT Patient: Priyanka Arora (75 y.o. male) Date: 12/19/2019 Diagnosis: Acute decompensated heart failure ([facility] [address] [I50.9] <principal problem not specified> Procedure(s) (LRB): LEFT BRACHIAL THROMBECTOMY (Left) 24 Days Post-Op Precautions: Fall, Sternal(LVAD ) Chart, occupational therapy assessment, plan of care, and goals were reviewed. ASSESSMENT Patient continues with skilled OT services and is progressing towards goals. However, patient continues to present with impulsivity, generalized weakness, impaired balance, impaired coordination (ataxia), decreased endurance/activity tolerance, fatigue, visual perception deficits (wearing eye patch for support with balance), decreased insight into deficits, and decreased safety awareness. Patient also continues with hematuria and Hgb 7.0 today.  Patient with decreased knowledge of LVAD terminology and requiring MAX A for switchover from PM> batteries today (patient unable to insert batteries into clips requiring max cues for lining up red arrows and OT facilitating the rest of the exchange). Patient will benefit from continued rehab services to maximize safety and independence with ADL tasks and can tolerate 3 hours of therapy at this time. Recommend IPR when medically stable. Current Level of Function Impacting Discharge (ADLs): MAX A UB dressing for LVAD management Other factors to consider for discharge: LVAD HM III; medical complexity PLAN : 
Patient continues to benefit from skilled intervention to address the above impairments. Continue treatment per established plan of care. to address goals. Recommend with staff: OOB x 3 to chair with 2 assist for line management and ataxia; BUE cardiac exercises; LVAD education Recommend next OT session: LB ADLs Recommendation for discharge: (in order for the patient to meet his/her long term goals) Therapy 3 hours per day 5-7 days per week This discharge recommendation: 
Has been made in collaboration with the attending provider and/or case management IF patient discharges home will need the following DME: to be determined (TBD) SUBJECTIVE:  
Patient stated I am ready.  OBJECTIVE DATA SUMMARY:  
Cognitive/Behavioral Status: 
Neurologic State: Alert Orientation Level: Oriented X4 Cognition: Appropriate for age attention/concentration Perception: Appears intact Perseveration: No perseveration noted Safety/Judgement: Decreased insight into deficits; Decreased awareness of need for safety Functional Mobility and Transfers for ADLs: 
Bed Mobility: 
Supine to Sit: Contact guard assistance;Assist x2; Additional time Sit to Supine: Minimum assistance;Assist x2; Additional time Scooting: Minimum assistance;Assist x2; Additional time Transfers: 
Sit to Stand: Minimum assistance;Assist x2; Additional time Functional Transfers Toilet Transfer : Maximum assistance(rolling on bed pan) Bed to Chair: Minimum assistance;Assist x2; Additional time(stand pivot transfer from bed > chair) Requires repetitive verbal cues for keeping UE proximal to ribcage Balance: 
Sitting: Impaired; Without support Sitting - Static: Good (unsupported) Sitting - Dynamic: Fair (occasional) Standing: Impaired; Without support Standing - Static: Fair;Constant support Standing - Dynamic : Poor;Constant support ADL Intervention: 
  
 
Grooming Brushing Teeth: Supervision;Set-up(seated in recliner) Upper Body Dressing Assistance Dressing Assistance: Maximum assistance(LVAD management) Toileting Toileting Assistance: Maximum assistance(bed pan) Cognitive Retraining Safety/Judgement: Decreased insight into deficits; Decreased awareness of need for safety Increase activity tolerance for home, work, and sexual intercourse by pacing self with increasing the arm exercises, sitting duration, frequency OOB, walking, standing, and ADLs. Instructed and indicated understanding of s/s of too much activity, how to respond to s/s safely. Activity Tolerance:  
Fair Please refer to the flowsheet for vital signs taken during this treatment. After treatment patient left in no apparent distress:  
Sitting in chair, Call bell within reach, and Bed / chair alarm activated COMMUNICATION/COLLABORATION:  
The patients plan of care was discussed with: Physical Therapist and Registered Nurse Beth Shipley Time Calculation: 39 mins

## 2019-12-19 NOTE — PROGRESS NOTES
[facility] VAD/Heart Failure Progress Note Admit Date: 10/25/2019 POD:  24 Days Post-Op Procedure:  Procedure(s): LEFT BRACHIAL THROMBECTOMY Subjective:  
Mild dyspnea, fatigue, and weakness; working on hematuria Objective:  
Vitals: 
Blood pressure 91/72, pulse 77, temperature 97.6 °F (36.4 °C), resp. rate 18, height 6' 2\" (1.88 m), weight 175 lb 7.8 oz (79.6 kg), SpO2 99 %. Temp (24hrs), Av.9 °F (36.6 °C), Min:97.5 °F (36.4 °C), Max:98.3 °F (36.8 °C) Hemodynamics: 
 CO: CO (l/min): 5.8 l/min CI: CI (l/min/m2): 2.8 l/min/m2 CVP: CVP (mmHg): 4 mmHg (19 1645) SVR: SVR (dyne*sec)/cm5: 1080 (dyne*sec)/cm5 (19 1200) PAP Systolic: PAP Systolic: 34 ( 0998) PAP Diastolic: PAP Diastolic: 13 (38/32/48 4766) PVR:   
 SV02: SVO2 (%): 67 % (19 1300) SCV02: SCVO2 (%): 75 % (10/29/19 1900) VAD Interrogation: LVAD (Heartmate) Pump Speed (RPM): 6400 Pump Flow (LPM): 5.5 PI (Pulsitility Index): 3 Power: 5.5 MAP: 68  Test: No 
Back Up  at Bedside & Labeled: Yes Power Module Test: No 
Driveline Site Care: No 
Driveline Dressing: Clean, Dry, and Intact EKG/Rhythm:   
 
Extubation Date / Time:  
 
CT Output:  
 
Ventilator: 
Ventilator Volumes Vt Set (ml): 550 ml (19 08) Vt Exhaled (Machine Breath) (ml): 639 ml (19 08) Vt Spont (ml): 437 ml (19 1035) Ve Observed (l/min): 7.25 l/min (19 1035) Oxygen Therapy: 
Oxygen Therapy O2 Sat (%): 99 % (19) Pulse via Oximetry: 82 beats per minute (19) O2 Device: Nasal cannula (19) O2 Flow Rate (L/min): 3 l/min (19) FIO2 (%): 21 % (19 172) CXR: 
 
Admission Weight: Last Weight Weight: 192 lb 10.9 oz (87.4 kg) Weight: 175 lb 7.8 oz (79.6 kg) Intake / Output / Drain: 
Current Shift: 701 - 1900 In: 63648.1 [P.O.:820; I.V.:87.1] Out: 39746 Last 24 hrs.:  
 
 Intake/Output Summary (Last 24 hours) at 12/19/2019 1624 Last data filed at 12/19/2019 1511 Gross per 24 hour Intake 97220.23 ml Output 53503 ml Net 3156.23 ml No results for input(s): CPK, CKMB, TROIQ in the last 72 hours. Recent Labs 12/19/19 
1607 12/19/19 
1009 12/19/19 
0236  12/18/19 
0406  12/17/19 
1986 NA  --   --  134*  --  133*  --  136 K  --   --  3.7  --  3.6  --  3.6 CO2  --   --  36*  --  35*  --  33* BUN  --   --  40*  --  41*  --  44* CREA  --   --  2.01*  --  2.09*  --  2.13* GLU  --   --  142*  --  96  --  117* MG  --   --  1.9  --  1.9  --  2.1 WBC  --   --  4.9  --  4.9  --  4.7 HGB 7.1* 7.0* 7.0*   < > 6.8*   < > 5.8* HCT 22.4* 22.4* 22.2*   < > 21.9*   < > 18.8* PLT  --   --  151  --  158  --  171  
 < > = values in this interval not displayed. Recent Labs 12/19/19 
0236 12/18/19 
0406 12/17/19 
7662 INR 2.1* 3.5* 2.9* PTP 20.1* 33.5* 28.2* APTT 37.9* 41.9* 41.4* No lab exists for component: PBNP Current Facility-Administered Medications:  
  bumetanide (BUMEX) tablet 2 mg, 2 mg, Oral, BID, Ayanna Palmer MD, 2 mg at 12/19/19 6140   warfarin (COUMADIN) tablet 1 mg, 1 mg, Oral, ONCE, Shana Sims NP 
  [START ON 12/20/2019] therapeutic multivitamin (THERAGRAN) tablet 1 Tab, 1 Tab, Oral, DAILY, Shana Sims NP 
  dronabinol (MARINOL) capsule 2.5 mg, 2.5 mg, Oral, BID, Mounika Altman MD 
  potassium chloride SR (KLOR-CON 10) tablet 40 mEq, 40 mEq, Oral, DAILY, Shana Sims NP, 40 mEq at 12/19/19 0110   alteplase (CATHFLO) 1 mg in sterile water (preservative free) 1 mL injection, 1 mg, InterCATHeter, PRN, Mounika Altman MD, 1 mg at 12/18/19 8937 
  0.9% sodium chloride infusion 250 mL, 250 mL, IntraVENous, PRN, Angelito Herrera NP 
  finasteride (PROSCAR) tablet 5 mg, 5 mg, Oral, DAILY, Delio Gabriel MD, 5 mg at 12/19/19 3687   dronabinol (MARINOL) capsule 2.5 mg, 2.5 mg, Oral, DAILY, Levi, Shana B, NP, 2.5 mg at 12/19/19 0851 
  0.9% sodium chloride infusion 250 mL, 250 mL, IntraVENous, PRN, Levi, Shana B, NP 
  0.9% sodium chloride infusion 250 mL, 250 mL, IntraVENous, PRN, Levi, Shana B, NP 
  albuterol-ipratropium (DUO-NEB) 2.5 MG-0.5 MG/3 ML, 3 mL, Nebulization, Q6H RT, Pankaj Andrews MD, 3 mL at 12/19/19 1412 
  oxymetazoline (AFRIN) 0.05 % nasal spray 2 Spray, 2 Spray, Both Nostrils, BID, Olegario Altman MD, 2 Spray at 12/19/19 3938   spironolactone (ALDACTONE) tablet 25 mg, 25 mg, Oral, DAILY, Mounika Altman MD, 25 mg at 12/19/19 0035   clonazePAM (KlonoPIN) tablet 0.5 mg, 0.5 mg, Oral, TID PRN, Mounika Serra MD, 0.5 mg at 12/17/19 1416 
  milrinone (PRIMACOR) 20 MG/100 ML D5W infusion, 0.25 mcg/kg/min, IntraVENous, CONTINUOUS, Alexi Simsin B, NP, Last Rate: 6.2 mL/hr at 12/19/19 1249, 0.25 mcg/kg/min at 12/19/19 1249 
  sildenafil (pulm.hypertension) (REVATIO) tablet 40 mg, 40 mg, Oral, TID, Mounika Altman MD, 40 mg at 12/19/19 2229   magnesium oxide (MAG-OX) tablet 400 mg, 400 mg, Oral, BID, PolliardBart, NP, 400 mg at 12/19/19 9780   diphenhydrAMINE (BENADRYL) capsule 25 mg, 25 mg, Oral, QHS PRN, Polliard, Bart Dionne, NP 
  octreotide (SANDOSTATIN) injection 25 mcg, 25 mcg, SubCUTAneous, TID, Polliard, Bart Dionne, NP, 25 mcg at 12/19/19 2387   benzocaine-menthol (CEPACOL) lozenge 1 Lozenge, 1 Lozenge, Mucous Membrane, PRN, Sharon Bart Dionne, NP 
  digoxin (LANOXIN) tablet 0.0625 mg, 62.5 mcg, Oral, Q MON, WED & FRI, Ceciliaiard, Rosellen Stef T, NP, 0.0625 mg at 12/18/19 2218   levETIRAcetam (KEPPRA) oral solution 250 mg, 250 mg, Oral, Q12H, Ceciliaiard, Rosellen Greeley T, NP, 250 mg at 12/19/19 3529   pantoprazole (PROTONIX) tablet 40 mg, 40 mg, Oral, ACB, Polliard, Rosellen Stef T, NP, 40 mg at 12/19/19 0721 
  0.9% sodium chloride infusion, 3 mL/hr, IntraVENous, CONTINUOUS, Mercedez Lincoln, TIERRA, Last Rate: 5 mL/hr at 12/13/19 0800, 5 mL/hr at 12/13/19 0800 
  allopurinol (ZYLOPRIM) tablet 100 mg, 100 mg, Oral, DAILY, Polliard, Deja LOPEZ, NP, 100 mg at 12/19/19 1856   arformoterol (BROVANA) neb solution 15 mcg, 15 mcg, Nebulization, BID RT, 15 mcg at 12/18/19 2124 **AND** budesonide (PULMICORT) 500 mcg/2 ml nebulizer suspension, 500 mcg, Nebulization, BID RT, Polliard, Deja LOPEZ, NP, 500 mcg at 12/19/19 0733 
  artificial saliva (MOUTH KOTE) 1 Spray, 1 Spray, Oral, PRN, Katelynnd, Bina Palmer, NP, 1 Spray at 12/12/19 1452   lactobac ac& pc-s.therm-b.anim (TIAN Q/RISAQUAD), 1 Cap, Oral, DAILY, Vinayak May MD, 1 Cap at 12/19/19 0850 
  oxyCODONE IR (ROXICODONE) tablet 5 mg, 5 mg, Oral, Q6H PRN, Chandana Elizalde MD, 5 mg at 12/18/19 1644   balsam peru-castor oil (VENELEX) ointment, , Topical, TID, Ricardo Andrews MD 
  [facility]) capsule 0.4 mg, 0.4 mg, Oral, DAILY, Logan Kincaid MD, 0.4 mg at 12/19/19 0851 
  insulin lispro (HUMALOG) injection, , SubCUTAneous, AC&HS, Shana Sims NP, 2 Units at 12/19/19 1249   Warfarin MD/NP dosing, , Other, PRN, Mounika Altman MD 
  epoetin yvonne-epbx (RETACRIT) injection 20,000 Units, 20,000 Units, SubCUTAneous, Q7D, Logan Kincaid MD, 20,000 Units at 12/17/19 0721 
  sodium chloride (NS) flush 5-40 mL, 5-40 mL, IntraVENous, Q8H, Chandana Elizalde MD, 10 mL at 12/19/19 6868   sodium chloride (NS) flush 5-40 mL, 5-40 mL, IntraVENous, PRN, Chandana Elizalde MD, 40 mL at 12/17/19 7162   acetaminophen (TYLENOL) tablet 650 mg, 650 mg, Oral, Q6H PRN, Chandana Elizalde MD, 650 mg at 12/19/19 0721 
  naloxone [facility]) injection 0.4 mg, 0.4 mg, IntraVENous, PRN, Chandana Elizalde MD 
  ondansetron [facility]) injection 4 mg, 4 mg, IntraVENous, Q4H PRN, Chandana Elizalde MD, 4 mg at 12/18/19 1302   senna-docusate (PERICOLACE) 8.6-50 mg per tablet 1 Tab, 1 Tab, Oral, Myrna Singleton MD, 1 Tab at 12/19/19 4306   bisacodyl (DULCOLAX) tablet 5 mg, 5 mg, Oral, DAILY PRN, Omari Barba MD 
  sucralfate (CARAFATE) tablet 1 g, 1 g, Oral, AC&HS, Omari Barba MD, 1 g at 12/19/19 1249   influenza vaccine 2019-20 (6 mos+)(PF) (FLUARIX/FLULAVAL/FLUZONE QUAD) injection 0.5 mL, 0.5 mL, IntraMUSCular, PRIOR TO DISCHARGE, Mounika Altman MD 
  hydrALAZINE (APRESOLINE) 20 mg/mL injection 20 mg, 20 mg, IntraVENous, Q6H PRN, Shana Sims NP, 20 mg at 12/10/19 0541 
  dextrose 10 % infusion 125-250 mL, 125-250 mL, IntraVENous, PRN, Yue Dalal MD, Stopped at 11/18/19 0700 A/P 
  
S/P LVAD - good flows Need for anti-coagulation - coumadin DM - insulin RV dysfunction - milrinone, diuretics  
  
Risk of morbidity and mortality - high Medical decision making - high complexity Signed By: Nhi Houston MD

## 2019-12-19 NOTE — PROGRESS NOTES
Urology Progress Note Patient: Marissa Villalba MRN: [id number]  SSN: [id number] YOB: 1950  Age: 71 y.o. Sex: male ADMITTED: 10/25/2019 to Carolin Patterson MD for Acute decompens[facility] [I50.9] POD# 24 Days Post-Op Procedure(s): LEFT BRACHIAL THROMBECTOMY Urine almost clear with moderate CBI going. Occasional small clot with irrigation. Vitals: Temp (24hrs), Av.1 °F (36.7 °C), Min:97.7 °F (36.5 °C), Max:98.5 °F (36.9 °C) Blood pressure 91/72, pulse 81, temperature 98.1 °F (36.7 °C), resp. rate 21, height 6' 2\" (1.88 m), weight 84.8 kg (186 lb 15.2 oz), SpO2 100 %. Intake and Output: 
 1901 -  0700 In: 173289.4 [P.O.:1085; I.V.:233] Out: 473253 Labs: 
Labs:  
Lab Results Component Value Date/Time WBC 4.9 2019 02:36 AM  
 HGB 7.0 (L) 2019 02:36 AM  
 Creatinine 2.01 (H) 2019 02:36 AM  
 
 
 
Assessment/Plan: 
 Hematuria improving. Continue intermittent hand irrigation and taper CBI. Signed By: Jhonny Wei MD - 2019

## 2019-12-19 NOTE — PROGRESS NOTES
Problem: Mobility Impaired (Adult and Pediatric) Goal: *Acute Goals and Plan of Care (Insert Text) Description FUNCTIONAL STATUS PRIOR TO ADMISSION: Patient was modified independent using a single point cane for functional mobility. Patient reports an increasingly sedentary lifestyle 2* fatigue and SOB/dyspnea. Retired (so is his wife). Patient reports x 3 falls within the last couple of weeks. Patient is wearing either nasal cannula or CPAP at night. LVAD work-up has been initiated. HOME SUPPORT PRIOR TO ADMISSION: The patient lived with his wife, but did not require physical assistance. Physical Therapy Goals: 
 
Weekly reassessment completed 12/19/2019 and goals downgraded as appropriate. 1.  Patient will move from supine to sit and sit to supine, scoot up and down and roll side to side in bed with contact guard assistance within 7 days. 2.  Patient will perform sit to/from stand with minimal assistance x 1 within 7 days. 3.  Patient will ambulate 25 feet with least restrictive assistive device and moderate assistance within 7 days. 4.  Stair goal to be formulated when appropriate. 5.  Patient will perform cardiac exercises per protocol with supervision within 7 days. 6.  Patient will verbally and functionally recall mindful movement principles (Move in the Tube) with minimal verbal cuing/reminding within 7 days. 7.  Patient will perform power exchange for power module to/from battery with moderate assistance within 7 days. Re-evaluation completed 11/20/2019 and new goals formulated following LVAD implantation. Reviewed and cont 12/3/2019. Reviewed and continued 12/12/2019 1. Patient will move from supine to sit and sit to supine, scoot up and down and roll side to side in bed with minimal assistance/contact guard assist within 7 days. 2.  Patient will perform sit to/from stand with minimal assistance/contact guard assist within 7 days. 3.  Patient will ambulate 150 feet with least restrictive assistive device and minimal assistance/contact guard assist within 7 days. 4.  Patient will ascend/descend 4 stairs with handrail(s) with minimal assistance/contact guard assist within 7 days. 5.  Patient will perform cardiac exercises per protocol with supervision within 7 days. 6.  Patient will verbally and functionally recall 3/3 sternal precautions within 7 days. 7.  Patient will perform power exchange for power module to/from battery with moderate assistance  within 7 days. Initiated 10/27/2019; updated 11/15/2019 1. Patient will move from supine to sit and sit to supine, scoot up and down and roll side to side in bed with independence within 7 days. 2.  Patient will perform sit to/from stand with supervision/set-up within 7 days. 3.  Patient will ambulate 200 feet with least restrictive assistive device and supervision/set-up within 7 days. 4.  Patient will ascend/descend 4 stairs with  handrail(s) with supervision/set-up within 7 days for functional strengthening and community reintegration. Joan Nolasco 5.  Patient will verbally and functionally recall 3/3 sternal precautions within 7 days in preparation for LVAD implantation. 6.  Patient will perform a mock power exchange for power module to/from battery with supervision/set-up within 7 days in preparation for LVAD implantation. 1.  Patient will move from supine to sit and sit to supine, scoot up and down and roll side to side in bed with independence within 7 days. 2.  Patient will perform sit to/from stand with supervision/set-up within 7 days. 3.  Patient will ambulate 150 feet with least restrictive assistive device and supervision/set-up within 7 days. 4.  Patient will ascend/descend 4 stairs with  handrail(s) with supervision/set-up within 7 days for functional strengthening and community reintegration. Joan Nolasco 5.  Patient will verbally and functionally recall 3/3 sternal precautions within 7 days in preparation for LVAD implantation. 6.  Patient will perform a mock power exchange for power module to/from battery with supervision/set-up within 7 days in preparation for LVAD implantation. Outcome: Not Progressing Towards Goal 
 
PHYSICAL THERAPY TREATMENT: WEEKLY REASSESSMENT Patient: Meg Vera (75 y.o. male) Date: 12/19/2019 Primary Diagnosis: Acute decompensated heart failure ([facility] [I50.9] Procedure(s) (LRB): LEFT BRACHIAL THROMBECTOMY (Left) 24 Days Post-Op Precautions:  Fall, Sternal/\"Move in the Tube\" (LVAD) ASSESSMENT Patient continues with skilled PT services and is not progressing towards goals, therefore ALL downgraded. Patient remains functionally limited by ataxia, weakness, debility, cranial nerve 3 palsy with diplopia, SOB/DOUGLAS, impulsivity, and complex hospital stay. Patient required maximal assistance for transitioning LVAD power sources. Patient required minimal assist x 2 for stand-pivot transfers (performed twice), and moderate assist for wheelchair propulsion in hallway (able to assist minimally via B LEs - focus on coordinated LE placement and LE strengthening with co-contractions. Again, continue to anticipate extensive rehabilitation needs. Patient's progression toward goals since last assessment: All goals downgraded Current Level of Function Impacting Discharge (mobility/balance): A x 2 Functional Outcome Measure: The patient scored 30/100 on the Barthel Index outcome measure which is indicative of 70% impairment in ability to perform functional tasks/ADLs. Other factors to consider for discharge: LVAD patient PLAN : 
Goals have been updated based on progression since last assessment. Patient continues to benefit from skilled intervention to address the above impairments.  
 
Recommendations and Planned Interventions: bed mobility training, transfer training, gait training, therapeutic exercises, neuromuscular re-education, edema management/control, patient and family training/education, and therapeutic activities Frequency/Duration: Patient will be followed by physical therapy:  5 times a week to address goals. Recommendation for discharge: (in order for the patient to meet his/her long term goals) To be determined: Rehab This discharge recommendation: 
Has been made in collaboration with the attending provider and/or case management IF patient discharges home will need the following DME: to be determined (TBD) SUBJECTIVE:  
Patient stated Well girls, it felt good to get out of my room.  OBJECTIVE DATA SUMMARY:  
HISTORY:   
Past Medical History:  
Diagnosis Date Degenerative disc disease, lumbar Heart failure ([facility] High cholesterol Hypertension Paroxysmal atrial fibrillation ([facility] 4/2/2019 Spinal stenosis Past Surgical History:  
Procedure Laterality Date COLONOSCOPY Left 11/11/2019 COLONOSCOPY at bedside performed by Lorelei Jorgensen MD at [address] HX CORONARY ARTERY BYPASS GRAFT    
 triple HX HERNIA REPAIR    
 HX IMPLANTABLE CARDIOVERTER DEFIBRILLATOR    
 CT CARDIOVERSION ELECTIVE ARRHYTHMIA EXTERNAL N/A 6/10/2019 EP CARDIOVERSION performed by Patrice Hodge MD at [address], [facility] [place] CATH LAB  
 CT CARDIOVERSION ELECTIVE ARRHYTHMIA EXTERNAL N/A 6/18/2019 EP CARDIOVERSION performed by Diego Juarez MD at [address] CATH LAB  
 CT INSJ/RPLCMT PERM DFB W/TRNSVNS LDS 1/DUAL [facility] N/A 6/21/2019 INSERT ICD BIV MULTI performed by Teofilo Pugh MD at [address], [address] CATH LAB  
 CT TCAT IMPL WRLS P-ART PRS SNR L-T HEMODYN MNTR N/A 9/18/2019 IMPLANT HEART FAILURE MONITORING DEVICE performed by Jennifer Grace MD at [address] CATH LAB Personal factors and/or comorbidities impacting plan of care: PMH, frequent \"set-backs\" during this admission Home Situation Home Environment: Private residence # Steps to Enter: 0 One/Two Story Residence: One story Living Alone: No 
Support Systems: Spouse/Significant Other/Partner Patient Expects to be Discharged to[de-identified] Unknown Current DME Used/Available at Home: Cane, straight, Walker, rolling, CPAP Tub or Shower Type: Shower EXAMINATION/PRESENTATION/DECISION MAKING:  
Critical Behavior: 
Neurologic State: Alert Orientation Level: Oriented X4 Cognition: Appropriate for age attention/concentration Safety/Judgement: Decreased insight into deficits, Decreased awareness of need for safety Hearing: Auditory Auditory Impairment: None Functional Mobility: 
Bed Mobility: 
  
Supine to Sit: Contact guard assistance;Assist x2; Additional time Sit to Supine: Minimum assistance;Assist x2; Additional time Scooting: Minimum assistance;Assist x2; Additional time Transfers: 
Sit to Stand: Minimum assistance;Assist x2; Additional time Stand to Sit: Contact guard assistance;Assist x2; Additional time Bed to Chair: Minimum assistance;Assist x2; Additional time(stand pivot transfer from bed > chair) Balance:  
Sitting: Impaired; Without support Sitting - Static: Good (unsupported) Sitting - Dynamic: Fair (occasional) Standing: Impaired; Without support Standing - Static: Fair;Constant support Standing - Dynamic : Poor;Constant support Functional Measure: 
Barthel Index: 
 
Bathin Bladder: 0 Bowels: 10 
Groomin Dressin Feedin Mobility: 0 Stairs: 0 Toilet Use: 0 Transfer (Bed to Chair and Back): 5 Total: 30/100 The Barthel ADL Index: Guidelines 1. The index should be used as a record of what a patient does, not as a record of what a patient could do. 2. The main aim is to establish degree of independence from any help, physical or verbal, however minor and for whatever reason. 3. The need for supervision renders the patient not independent.  
4. A patient's performance should be established using the best available evidence. Asking the patient, friends/relatives and nurses are the usual sources, but direct observation and common sense are also important. However direct testing is not needed. 5. Usually the patient's performance over the preceding 24-48 hours is important, but occasionally longer periods will be relevant. 6. Middle categories imply that the patient supplies over 50 per cent of the effort. 7. Use of aids to be independent is allowed. Stacy Larios., Barthel, D.W. (6782). Functional evaluation: the Barthel Index. [address] [facility] (14)2. Martín Dean gabriela CAROLINA Velez, Mikel Arias., Caleb Pino., [place], [address] (1999). Measuring the change indisability after inpatient rehabilitation; comparison of the responsiveness of the Barthel Index and Functional Johnston Measure. Journal of Neurology, Neurosurgery, and Psychiatry, 66(4), 040-485. ZAMZAM AlemanA, SEVERO Lindsay, & Collins Nuno MLILIAN. (2004.) Assessment of post-stroke quality of life in cost-effectiveness studies: The usefulness of the Barthel Index and the EuroQoL-5D. [facility], 13, 328-83 Activity Tolerance:  
Fair Please refer to the flowsheet for vital signs taken during this treatment. After treatment patient left in no apparent distress:  
Sitting in chair, Call bell within reach, and Bed / chair alarm activated COMMUNICATION/EDUCATION:  
The patients plan of care was discussed with: Occupational Therapist, Registered Nurse, Physician, and . Fall prevention education was provided and the patient/caregiver indicated understanding., Patient/family have participated as able in goal setting and plan of care. , and Patient/family agree to work toward stated goals and plan of care. Thank you for this referral. 
Aidan Fuens, PT, DPT Time Calculation: 39 mins

## 2019-12-19 NOTE — PROGRESS NOTES
[id number] [facility] [address] [facility] in [place], [place] Inpatient Progress Note Patient name: [id number] Darrion Ovalles Patient : 1950 Patient MRN: [id number] Attending MD: Christy Aranda MD 
Date of service: 19 Chief Complaint:  
Cecy Font azucena LVAD implant 
  
HPI: Sona Kiser is a 71y.o. year old pleasant white male with a history of HTN, HLD, JOSE, CAD s/p cardiac arrest VFib s/p CABG () c/b sternal would infection and sternectomy, ischemic cardiomyopathy LVEF 15-20%, s/p ICD and with LBBB. He was admitted with acute on chronic systolic heart failure with massive volume overload > 20 lbs, in the setting of atrial fibrillation s/p failed DCCV and underwent DCCV and RHC on .  S/p BiVICD on 19 with Dr. Isael Kline. He was discharged home home on IV milrinone on 19. Vivian Ovalles has been followed closely by Dr. Manish Law and the [facility]. 
  
Mr. Kiser was admitted for acute on chronic systolic heart failure. He underwent implantation of Impella 5.0 due to CS and has completed his LVAD evaluation. Vivian Ovalles meets criteria for implant of HM3 as DT. He was NYHA Class IV prior to implant of Impella 5.0, has LVEF < 15%, was intolerant of GDMT due to symptomatic hypotension and renal dysfunction. He remains dependent on temporary MCS support. RHC  revealed compensated hemodynamics on Impella. His renal function has improved dramatically with improvement in his hemodynamics. He is not a suitable transplant candidate due to single kidney, sternectomy, debility, and frailty. He was reviewed by Lynette Leblanc and felt to be a high risk heart transplant candidate due to multiple co-morbidities as well as the afore mentioned conditions.  He remained in the CCU and underwent a HM 3 implant as DT on . He was weaned off of pressors and transferred to [place] [address] where he continues to undergo PT/OT and hemodynamic optimization.   
  
24Hr Events Continues to have hematuria CBI ongoing Hgb 7 Admits to depressed mood, anorexia Procedure:  Procedure(s): REMOVAL TEMPORARY LEFT VENTRICULAR ASSIST DEVICE, IMPLANTATION OF LEFT VENTRICULAR ASSIST DEVICE PERMANENT (VAD), ECC, JACQUE, EPI AORTIC US BY DR Tl Campoverde.    
POD:  30 
  
Impression / Plan:  
Heart Failure Status: NYHA Class IV Chronic systolic heart failure due to ICM, NYHA Class IV, EF < 15%, cardiogenic shock bridged with Impella 5.0 support to HM 3 implant S/p Impella removal and LVAD implant 11/18/19 RPMs 6400 rpm  
TTE with bubble study negative for PFO Decrease milrinone to 0.25 mcg/kg/min Obtain daily CardioMEMs readings when in stepdown unit Continue Bumex 2 mg IV BID Cont Sildenafil to 40 mg PO TID - rx sent to local pharmacy to initiate PA Intolerant of BB due to RV failure Intolerant of ACEi/ARB/ARNI/AA due to JUAN J on CKD 3 Strict I/O, daily weights, Na+ restricted diet Continue LVAD education Daily dressing changes until POD 30 TTE 12/16- EF 15-20% QTc 374 12/18/19 Generalized myoclonus Improved Appreciate Neurology input Continue Keppra with renal dosing Head CT negative for acute process EEG suggestive of mild generalized encephalopathic process, possibly related to underlying structural brain injury vs metabolic abnormality Monitor closely  
  
Anticoagulation for LVAD, INR goal 2-3 DC ASA d/t hematuria INR 2.1 Resume low dose coumadin Epistaxis Resolved Afrin soaked guaze ENT consult appreciated Monitor for now  
  
Acute Respiratory Failure post op Improved with aggressive diuresis, but still c/o dyspnea CXR and O2 requirement improved ABG acceptable TTE with Bubble study negative for PFO Pulmonary Consult appreciated Pulmonary hygiene Cont home CPAP- must use while sleeping  
 milrinone dose to 0.25 mcgs/kg/min Acute on CKD 3, atrophic left kidney Appreciate Nephrology assistance Watch Cr - improving Daily diuretic dosing Avoid nephrotoxins, trend labs Renally dose meds  
  
CAD s/p CABG Cont low dose ASA- watch platelets No BB d/t RV failure Hold statin due to recent hepatic failure LHC performed 11/13 - low likelihood of benefit from revascularization  
  
Hx of VF arrest s/p BiV-ICD No recent shocks Keep K > 4 and Mg > 2  
   
PAF Dig level 0.9 -cont dig (62.5 mcg qMWF) No BB due to RV failure Repeat TFTs WNL 
  
Hx of gout Uric acid WNL Acute blood loss anemia Likely due to hematuria Cont octreotide 25 mcg SQ TID Transfuse to keep Hgb > 7.0 Fecal occult 12/14 negative, positive on 12/17 Appreciate urology recommendations Cont CBI GI following, low suspicion for GIB 
  
Suspected aspiration pneumonia Sputum culture showing scant growth of yeast 
Cefepime until 12/19 Urinary retention, c/b serratia UTI and hematuria Appreciate Urology consult - asked to see again due to new hematuria Repeat UA with cx  
 cefepime Cystoscopy performed 11/13 shows catheter induced cystitis Sepsis Alert Paired Blood NGTD Urine cx negative Sputum cx when able Malnutrition Appreciate Nutritionist consult Prealbumin weekly - 13 on 12/16 Diet as tolerated Intolerant of mirtazapine d/t tremors, confusion Cont low dose Marinol Start MVI 
  
COPD Appreciate Pulmonology assistance Continue nebs PRN   
  
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  
  
Hx of sternal wound infection, sternectomy Sternum closed post op- monitor  
  
Vocal cord paralysis Improved ENT recs appreciated Neck CT- R neck edema, no airway compromise Speech therapy following - appreciate input Anxiety Klonipin 0.5 mg TID prn anxiety Depression Start lexapro 10 mg qpm 
Monitor QTc 
  
Debility Continue PT/OT  
  
Ppx Protonix PT/OT Bowel regimen PICC placed 11/22/19  
  
Dispo: Will need IP rehab. Not ready for discharge at this time Patient seen and examined. Data and note reviewed. I have discussed and agree with the plans as noted. 71year old male with a history of ICM s/p LVAD as DT whose post op course has been complicated by RV failure, JUAN J on CKD3, myoclonus, diplopia, malnutrition, aspiration, and hematuria/epistaxis. Continue CBI as directed by urology. Will very slowly wean IV milrinone and monitor renal function and volume status. Start lexapro for depression. Continue PT/OT and LVAD education. He will need inpatient rehab. Thank you for allowing us to participate in your patient's care. Mounika Holley MD, Chas Moss Chief of Cardiology, [facility] Medical Director Calos Rueda [address] [place] [address], [address] Office [phone] Fax [phone] LVAD INTERROGATION: 
Device interrogated in person Device function normal, normal flow, no PI events LVAD Pump Speed (RPM): 6400 Pump Flow (LPM): 5.8 MAP: 68 
PI (Pulsitility Index): 3.1 Power: 5.6  Test: Yes 
Back Up  at Bedside & Labeled: Yes Power Module Test: No 
Driveline Site Care: No 
Driveline Dressing: Clean, Dry, and Intact Outpatient: No 
MAP in Therapeutic Range (Outpatient): Yes Testing Alarms Reviewed: Yes 
Back up SC speed: 6400 Back up Low Speed Limit: 6000 Emergency Equipment with Patient?: Yes Emergency procedures reviewed?: Yes Drive line site inspected?: No(covered by dressing) Drive line intergrity inspected?: Yes Drive line dressing changed?: No 
 
PHYSICAL EXAM: 
Visit Vitals BP 91/72 (BP 1 Location: Left arm, BP Patient Position: At rest) Pulse 80 Temp 98 °F (36.7 °C) Resp 18 Ht 6' 2\" (1.88 m) Wt 175 lb 7.8 oz (79.6 kg) SpO2 100% BMI 22.53 kg/m² Physical Exam  
Constitutional: He is oriented to person, place, and time.  He appears well-developed. He appears cachectic. No distress. OOB, working with PT  
HENT:  
Head: Normocephalic. Neck: Normal range of motion. Neck supple. No JVD present. Cardiovascular: Normal rate, regular rhythm and normal heart sounds. + VAD hum Pulmonary/Chest: Effort normal and breath sounds normal. No respiratory distress. Abdominal: Soft. Bowel sounds are normal. He exhibits no distension. Dobhoff in place Genitourinary:    Genitourinary Comments: Hematuria Musculoskeletal: Normal range of motion. General: No edema. Neurological: He is alert and oriented to person, place, and time. Skin: Skin is warm and dry. Nursing note and vitals reviewed. REVIEW OF SYSTEMS: 
Review of Systems Constitutional: Positive for malaise/fatigue. Negative for chills and fever. HENT: Positive for hearing loss. Negative for nosebleeds. Respiratory: Negative for cough and shortness of breath. Mild dyspnea Cardiovascular: Negative for chest pain, palpitations, orthopnea and leg swelling. Gastrointestinal: Negative for heartburn, nausea and vomiting. Genitourinary: Positive for hematuria. Musculoskeletal: Negative. Neurological: Positive for weakness. Negative for dizziness and headaches. Endo/Heme/Allergies: Bruises/bleeds easily. PAST MEDICAL HISTORY: 
Past Medical History:  
Diagnosis Date  Degenerative disc disease, lumbar  Heart failure ([address]  High cholesterol  Hypertension  Paroxysmal atrial fibrillation ([address] 4/2/2019  Spinal stenosis PAST SURGICAL HISTORY: 
Past Surgical History:  
Procedure Laterality Date  COLONOSCOPY Left 11/11/2019 COLONOSCOPY at bedside [facility]  HX CORONARY ARTERY BYPASS GRAFT    
 triple  HX HERNIA REPAIR    
 HX IMPLANTABLE CARDIOVERTER DEFIBRILLATOR  UT CARDIOVERSION ELECTIVE ARRHYTHMIA EXTERNAL N/A 6/10/2019 EP CARDIOVERSION performed by Eris Park MD at [address] [facility] [address] CATH LAB  MA CARDIOVERSION ELECTIVE ARRHYTHMIA EXTERNAL N/A 2019 EP CARDIOVERSION performed by Debby Mosqueda MD at [address], [facility] [address] CATH LAB  MA INSJ/RPLCMT PERM DFB W/TRNSVNS LDS 1/DUAL CHMBR N/A 2019 INSERT ICD BIV MULTI [facility] CATH LAB  MA TCAT IMPL WRLS P-ART PRS SNR L-T HEMODYN MNTR N/A 2019 IMPLANT HEART FAILURE MONITORING DEVICE performed by Lydia Chen MD at [address], [facility]  CATH LAB FAMILY HISTORY: 
Family History Problem Relation Age of Onset  Lung Disease Mother  Hypertension Mother [facility] Arthritis-osteo Mother  Heart Disease Mother  Heart Disease Father  Diabetes Father SOCIAL HISTORY: 
Social History Socioeconomic History  Marital status:  Spouse name: Not on file  Number of children: Not on file  Years of education: Not on file  Highest education level: Not on file Tobacco Use  Smoking status: Former Smoker Last attempt to quit: 2010 Years since quittin.0  Smokeless tobacco: Never Used Substance and Sexual Activity  Alcohol use: Not Currently Comment: rarely  Drug use: Never Other Topics Concern LABORATORY RESULTS: 
  
Labs Latest Ref Rng & Units 2019 WBC 4.1 - 11.1 K/uL - 4.9 - - 4.9 - -  
RBC 4.10 - 5.70 M/uL - 2.43(L) - - 2.37(L) - - Hemoglobin 12.1 - 17.0 g/dL 7. 0(L) 7. 0(L) 7. 3(L) 7. 9(L) 6. 8(L) 7. 1(L) 6. 2(L) Hematocrit 36.6 - 50.3 % 22. 4(L) 22. 2(L) 23. 2(L) 24. 9(L) 21. 9(L) 22. 7(L) 20. 3(L) MCV 80.0 - 99.0 FL - 91.4 - - 92.4 - - Platelets 771 - 670 K/uL - 151 - - 158 - - Lymphocytes 12 - 49 % - - - - - - - Monocytes 5 - 13 % - - - - - - - Eosinophils 0 - 7 % - - - - - - - Basophils 0 - 1 % - - - - - - - Albumin 3.5 - 5.0 g/dL - 2. 2(L) - - 2. 3(L) - -  
 Calcium 8.5 - 10.1 MG/DL - 8.7 - - 8.7 - -  
SGOT 15 - 37 U/L - 16 - - 14(L) - -  
Glucose 65 - 100 mg/dL - 142(H) - - 96 - -  
BUN 6 - 20 MG/DL - 40(H) - - 41(H) - - Creatinine 0.70 - 1.30 MG/DL - 2.01(H) - - 2.09(H) - - Sodium 136 - 145 mmol/L - 134(L) - - 133(L) - - Potassium 3.5 - 5.1 mmol/L - 3.7 - - 3.6 - -  
TSH 0.36 - 3.74 uIU/mL - - - - - - -  
PSA 0.01 - 4.0 ng/mL - - - - - - -  
LDH 85 - 241 U/L - 233 - - 204 - -  
CEA ng/mL - - - - - - - Some recent data might be hidden Lab Results Component Value Date/Time TSH 2.30 12/03/2019 03:29 AM  
 TSH 2.40 10/25/2019 07:39 PM  
 TSH 2.45 06/01/2019 04:16 AM  
 
 
ALLERGY: 
No Known Allergies CURRENT MEDICATIONS: 
Current Facility-Administered Medications Medication Dose Route Frequency  bumetanide (BUMEX) tablet 2 mg  2 mg Oral BID  warfarin (COUMADIN) tablet 1 mg  1 mg Oral ONCE  
 [START ON 12/20/2019] therapeutic multivitamin (THERAGRAN) tablet 1 Tab  1 Tab Oral DAILY  potassium chloride SR (KLOR-CON 10) tablet 40 mEq  40 mEq Oral DAILY  alteplase (CATHFLO) 1 mg in sterile water (preservative free) 1 mL injection  1 mg InterCATHeter PRN  
 0.9% sodium chloride infusion 250 mL  250 mL IntraVENous PRN  finasteride (PROSCAR) tablet 5 mg  5 mg Oral DAILY  dronabinol (MARINOL) capsule 2.5 mg  2.5 mg Oral DAILY  0.9% sodium chloride infusion 250 mL  250 mL IntraVENous PRN  
 0.9% sodium chloride infusion 250 mL  250 mL IntraVENous PRN  
 albuterol-ipratropium (DUO-NEB) 2.5 MG-0.5 MG/3 ML  3 mL Nebulization Q6H RT  
 oxymetazoline (AFRIN) 0.05 % nasal spray 2 Spray  2 Spray Both Nostrils BID  spironolactone (ALDACTONE) tablet 25 mg  25 mg Oral DAILY  clonazePAM (KlonoPIN) tablet 0.5 mg  0.5 mg Oral TID PRN  
 milrinone (PRIMACOR) 20 MG/100 ML D5W infusion  0.25 mcg/kg/min IntraVENous CONTINUOUS  
 sildenafil (pulm.hypertension) (REVATIO) tablet 40 mg  40 mg Oral TID  magnesium oxide (MAG-OX) tablet 400 mg  400 mg Oral BID  diphenhydrAMINE (BENADRYL) capsule 25 mg  25 mg Oral QHS PRN  
 octreotide (SANDOSTATIN) injection 25 mcg  25 mcg SubCUTAneous TID  benzocaine-menthol (CEPACOL) lozenge 1 Lozenge  1 Lozenge Mucous Membrane PRN  
 cefepime (MAXIPIME) 2 g in 0.9% sodium chloride (MBP/ADV) 100 mL  2 g IntraVENous Q24H  
 digoxin (LANOXIN) tablet 0.0625 mg  62.5 mcg Oral Q MON, WED & FRI  levETIRAcetam (KEPPRA) oral solution 250 mg  250 mg Oral Q12H  pantoprazole (PROTONIX) tablet 40 mg  40 mg Oral ACB  
 0.9% sodium chloride infusion  3 mL/hr IntraVENous CONTINUOUS  
 allopurinol (ZYLOPRIM) tablet 100 mg  100 mg Oral DAILY  arformoterol (BROVANA) neb solution 15 mcg  15 mcg Nebulization BID RT And  
 budesonide (PULMICORT) 500 mcg/2 ml nebulizer suspension  500 mcg Nebulization BID RT  
 artificial saliva (MOUTH KOTE) 1 Spray  1 Spray Oral PRN  
 lactobac ac& pc-s.therm-b.anim (TIAN Q/RISAQUAD)  1 Cap Oral DAILY  oxyCODONE IR (ROXICODONE) tablet 5 mg  5 mg Oral Q6H PRN  
 balsam peru-castor oil (VENELEX) ointment   Topical TID  tamsulosin (FLOMAX) capsule 0.4 mg  0.4 mg Oral DAILY  insulin lispro (HUMALOG) injection   SubCUTAneous AC&HS  Warfarin MD/NP dosing   Other PRN  
 epoetin yvonne-epbx (RETACRIT) injection 20,000 Units  20,000 Units SubCUTAneous Q7D  
 sodium chloride (NS) flush 5-40 mL  5-40 mL IntraVENous Q8H  
 sodium chloride (NS) flush 5-40 mL  5-40 mL IntraVENous PRN  
 acetaminophen (TYLENOL) tablet 650 mg  650 mg Oral Q6H PRN  
 naloxone (NARCAN) injection 0.4 mg  0.4 mg IntraVENous PRN  
 ondansetron (ZOFRAN) injection 4 mg  4 mg IntraVENous Q4H PRN  
 senna-docusate (PERICOLACE) 8.6-50 mg per tablet 1 Tab  1 Tab Oral DAILY  bisacodyl (DULCOLAX) tablet 5 mg  5 mg Oral DAILY PRN  
 sucralfate (CARAFATE) tablet 1 g  1 g Oral AC&HS  influenza vaccine 2019-20 (6 mos+)(PF) (FLUARIX/FLULAVAL/FLUZONE QUAD) injection 0.5 mL  0.5 mL IntraMUSCular PRIOR TO DISCHARGE  hydrALAZINE (APRESOLINE) 20 mg/mL injection 20 mg  20 mg IntraVENous Q6H PRN  
 dextrose 10 % infusion 125-250 mL  125-250 mL IntraVENous PRN Thank you for allowing me to participate in this patient's care. TIERRA Ganoa [address], [address] [facility] Phone: [phone] Fax: [phone]

## 2019-12-19 NOTE — PROGRESS NOTES
JESÚS Plan: 
  Patient s/p LVAD implant 11/18/19; hematuria; continuous bladder irrigation; acute post op respiratory failure Disposition pending medical progress; rehab likely needed CM updated this AM in IDR. Patient still receiving bladder irrigation for hematuria. Therapy is working with patient - hopedarek OOB to chair today. CM will continue to follow. Rehab for disposition.  
 
Asya Sethi, MPH

## 2019-12-19 NOTE — PROGRESS NOTES
NUTRITION COMPLETE ASSESSMENT 
 
RECOMMENDATIONS:  
1. Increase marinol dose since only getting 1/2 of recommended starting dose with continued poor appetite. 2. Encourage oral intake - please make sure to thicken all liquids including Ensure shakes (2 pkts per shake) - should be drinking 3-4 Ensure shakes per day 3. Add imodium PRN with loose stools 
 - reduce mag-ox if able Interventions/Plan:  
Food/Nutrient Delivery:  (-) Commercial supplement(Ensure 4x/day)     (-) Assessment:  
Reason for Assessment: Reassessment Diet: regular, nectar-thick liquids Supplements: Ensure Enlive 3x/day Nutritionally Significant Medications: [x] Reviewed & Includes: allopurinol, retacrit, bumex, marinol (2.5mg daily), SSI, Bhakti-Q, keppra, mag ox, octreotide, protonix, KCl, pericolace, carafate; angiomax drip PRN: artifical saliva PRN, zofran Meal intake:  
Patient Vitals for the past 168 hrs: 
 % Diet Eaten 12/19/19 0754 50 % 12/18/19 1511 25 % 12/18/19 1116 25 % 12/18/19 0749 75 % 12/17/19 1501 100 % 12/17/19 1124 75 % 12/17/19 0953 100 % 12/16/19 1526 100 % 12/16/19 1112 75 % 12/16/19 0804 50 % 12/15/19 1507 0 % 12/15/19 1232 50 % 12/15/19 1110 10 % 12/15/19 0948 10 % 12/15/19 0815 0 % 12/14/19 1738 50 % 12/14/19 1512 75 % 12/14/19 1250 0 % 12/14/19 1104 0 % 12/14/19 0852 50 % 12/12/19 1800 75 % 12/12/19 1300 50 % Subjective: \"I like the eggs but not the potatoes. \"  RN reports pt consistently drinking 3 Ensure per day. Objective: 
Pt admitted for CHF. PMHx: HTN, CKD, chronic systolic heart failure, depression, others noted. S/p removal of impella (placed 10/29) and LVAD placement on 11/18. CBI in place for hematuria. Vit K given on 12/18. Octreotide rx with anemia but no GI bleed found. Renal following with alkalosis noted. Bumex switched to PO. Low sodium and chloride noted. Appetite continues to fluctuate with fair average intake.  Diet upgraded to solids with nectar-thick liquids this week. Marinol in place, remeron discontinued d/t side effects. Tube feeds off since 12/15 when DHT removed d/t nose bleed. Pt visited and continues to do best with breakfast. Very happy to hear pt has been drinking 3 Ensure per day (1050kcal, 60g protein) and thinks he could manage another in the evening. If drinking 4 per day will be meeting 71% energy and 81% protein needs + PO. Severe wt loss over past 6 months. Severe muscle/fat wasting. Pt meets criteria for severe malnutrition. Last 3 Recorded Weights in this Encounter 12/17/19 0730 12/18/19 0905 12/19/19 8486 Weight: 84.7 kg (186 lb 11.7 oz) 84.8 kg (186 lb 15.2 oz) 79.6 kg (175 lb 7.8 oz) Estimated Nutrition Needs:  
Kcals/day: 1970 Kcals/day(1970-2134kcal) Protein: 98 g(81-98g (1-1.2g/kg - JUAN J on CKD with malnutrition)) Fluid: 1970 ml(1ml/kcal or per renal/cardio) Based On: [place] St Jeor(x 1.2-1.3) Weight Used: Actual wt(81.3kg) Pt expected to meet estimated nutrient needs:  [x]   Yes - with supplements and PO []  No [] Unable to predict at this time Nutrition Diagnosis: 1. Malnutrition(improving) related to CHF vs depression vs malignancy as evidenced by >10% weight loss x 6 months; severe muscle/fat wasting; consuming >70% needs orally 2. Inadequate oral intake(improving) related to poor appetite, AMS as evidenced by poor intake with meals but consuming 3 Ensure/day 3. Swallowing difficulty(improved) related to weakness with moderate pharyngeal dysphagia as evidenced by regular texture; nectar-thick liquids Goals:   
 Continued consumption of at least 3-4 Ensure per day + PO with meals Monitoring & Evaluation: - Total energy intake, Liquid meal replacement, Protein intake - Weight/weight change Previous Nutrition Goals Met:   N/A Previous Recommendations:    N/A Education & Discharge Needs: 
 [x] None Identified 
 [] Identified and addressed [x] Participated in care plan, discharge planning, and/or interdisciplinary rounds Cultural, Uatsdin and ethnic food preferences identified: None Skin Integrity: []Intact  [x]Other: sternal wound Edema: []None [x]Other: 1+ BLLE Last BM: 12/19 Food Allergies: [x]None []Other Diet Restrictions: Cultural/Anabaptism Preference(s): None Anthropometrics:   
Weight Loss Metrics 12/19/2019 10/25/2019 10/25/2019 10/25/2019 9/30/2019 9/18/2019 9/16/2019 Today's Wt 175 lb 7.8 oz - 193 lb 6.4 oz - 199 lb 14.4 oz 196 lb 199 lb BMI - 22.53 kg/m2 - 24.83 kg/m2 25.67 kg/m2 25.16 kg/m2 25.55 kg/m2 Weight Source: Bed Height: 6' 2\" (188 cm), Body mass index is 22.53 kg/m². IBW : 86.2 kg (190 lb), % IBW (Calculated): 96.32 % 
 ,   
 
Labs:   
Lab Results Component Value Date/Time Sodium 134 (L) 12/19/2019 02:36 AM  
 Potassium 3.7 12/19/2019 02:36 AM  
 Chloride 94 (L) 12/19/2019 02:36 AM  
 CO2 36 (H) 12/19/2019 02:36 AM  
 Glucose 142 (H) 12/19/2019 02:36 AM  
 BUN 40 (H) 12/19/2019 02:36 AM  
 Creatinine 2.01 (H) 12/19/2019 02:36 AM  
 Calcium 8.7 12/19/2019 02:36 AM  
 Magnesium 1.9 12/19/2019 02:36 AM  
 Phosphorus 3.3 11/27/2019 04:18 AM  
 Albumin 2.2 (L) 12/19/2019 02:36 AM  
 
Lab Results Component Value Date/Time Hemoglobin A1c 6.6 (H) 10/25/2019 02:56 PM  
 
 
Lito Gunter, JEANNINE [address] [place] , Pager #[phone]

## 2019-12-19 NOTE — PROGRESS NOTES
[place] [id number] [place], [address] GI PROGRESS NOTE Will Fer Stout, [address] [facility] office [phone] NP/PA in-hospital cell phone M-F until 4:30PM 
After 5PM or on weekends, please call  for physician on call NAME: Priyanka Arora :  1950 MRN:  [id number] Subjective:  
Patient is sitting in the chair. No nausea, vomiting, or abdominal pain. Brown bowel movement documented today. No signs of GI bleeding. Objective: VITALS:  
Last 24hrs VS reviewed since prior progress note. Most recent are: 
Visit Vitals BP 91/72 (BP 1 Location: Left arm, BP Patient Position: At rest) Pulse 80 Temp 97.5 °F (36.4 °C) Resp 18 Ht 6' 2\" (1.88 m) Wt 79.6 kg (175 lb 7.8 oz) SpO2 95% BMI 22.53 kg/m² PHYSICAL EXAM: 
General: Cooperative, no acute distress   
Neurologic:  Alert and oriented, drowsy HEENT: EOMI, no scleral icterus Lungs:  Diminished bilaterally anteriorly Heart:  S1 S2, + LVAD hum Abdomen: Soft, non-distended, no tenderness, no guarding, no rebound. +Bowel sounds. Extremities: Warm Psych:   Not anxious or agitated Lab Data Reviewed:  
 
Recent Results (from the past 24 hour(s)) GLUCOSE, POC Collection Time: 19  4:46 PM  
Result Value Ref Range Glucose (POC) 131 (H) 65 - 100 mg/dL Performed by Felix Gomez   
HGB & HCT Collection Time: 19  6:36 PM  
Result Value Ref Range HGB 7.3 (L) 12.1 - 17.0 g/dL HCT 23.2 (L) 36.6 - 50.3 % GLUCOSE, POC Collection Time: 19  9:02 PM  
Result Value Ref Range Glucose (POC) 135 (H) 65 - 100 mg/dL Performed by XAVI ARAMBULA   
PROCALCITONIN Collection Time: 19  2:36 AM  
Result Value Ref Range Procalcitonin 0.16 ng/mL LACTIC ACID Collection Time: 19  2:36 AM  
Result Value Ref Range Lactic acid 1.0 0.4 - 2.0 MMOL/L  
DIGOXIN Collection Time: 19  2:36 AM  
Result Value Ref Range Digoxin level 0.9 0.90 - 2.00 NG/ML  
LD Collection Time: 12/19/19  2:36 AM  
Result Value Ref Range  85 - 241 U/L  
NT-PRO BNP Collection Time: 12/19/19  2:36 AM  
Result Value Ref Range NT pro-BNP 3,694 (H) <657 PG/ML  
METABOLIC PANEL, COMPREHENSIVE Collection Time: 12/19/19  2:36 AM  
Result Value Ref Range Sodium 134 (L) 136 - 145 mmol/L Potassium 3.7 3.5 - 5.1 mmol/L Chloride 94 (L) 97 - 108 mmol/L  
 CO2 36 (H) 21 - 32 mmol/L Anion gap 4 (L) 5 - 15 mmol/L Glucose 142 (H) 65 - 100 mg/dL BUN 40 (H) 6 - 20 MG/DL Creatinine 2.01 (H) 0.70 - 1.30 MG/DL  
 BUN/Creatinine ratio 20 12 - 20 GFR est AA 40 (L) >60 ml/min/1.73m2 GFR est non-AA 33 (L) >60 ml/min/1.73m2 Calcium 8.7 8.5 - 10.1 MG/DL Bilirubin, total 0.7 0.2 - 1.0 MG/DL  
 ALT (SGPT) 11 (L) 12 - 78 U/L  
 AST (SGOT) 16 15 - 37 U/L Alk. phosphatase 101 45 - 117 U/L Protein, total 6.1 (L) 6.4 - 8.2 g/dL Albumin 2.2 (L) 3.5 - 5.0 g/dL Globulin 3.9 2.0 - 4.0 g/dL A-G Ratio 0.6 (L) 1.1 - 2.2 MAGNESIUM Collection Time: 12/19/19  2:36 AM  
Result Value Ref Range Magnesium 1.9 1.6 - 2.4 mg/dL CBC W/O DIFF Collection Time: 12/19/19  2:36 AM  
Result Value Ref Range WBC 4.9 4.1 - 11.1 K/uL  
 RBC 2.43 (L) 4.10 - 5.70 M/uL HGB 7.0 (L) 12.1 - 17.0 g/dL HCT 22.2 (L) 36.6 - 50.3 % MCV 91.4 80.0 - 99.0 FL  
 MCH 28.8 26.0 - 34.0 PG  
 MCHC 31.5 30.0 - 36.5 g/dL  
 RDW 16.8 (H) 11.5 - 14.5 % PLATELET 612 448 - 689 K/uL MPV 9.5 8.9 - 12.9 FL  
 NRBC 0.0 0  WBC ABSOLUTE NRBC 0.00 0.00 - 0.01 K/uL PROTHROMBIN TIME + INR Collection Time: 12/19/19  2:36 AM  
Result Value Ref Range INR 2.1 (H) 0.9 - 1.1 Prothrombin time 20.1 (H) 9.0 - 11.1 sec PTT Collection Time: 12/19/19  2:36 AM  
Result Value Ref Range aPTT 37.9 (H) 22.1 - 32.0 sec  
 aPTT, therapeutic range     58.0 - 77.0 SECS  
TYPE & SCREEN  Collection Time: 12/19/19  2:36 AM  
 Result Value Ref Range Crossmatch Expiration 12/22/2019 ABO/Rh(D) O POSITIVE Antibody screen NEG Comment Previously identified nonspecific and nonspecific cold antibodies ROYER Poly POS   
 ROYER IgG POS   
 ROYER C3b/C3d NEG Unit number [id number] Blood component type RC LR,2 Unit division 00 Status of unit ALLOCATED Crossmatch result Compatible Unit number [id number] Blood component type RC LR Unit division 00 Status of unit ALLOCATED Crossmatch result Compatible GLUCOSE, POC Collection Time: 12/19/19  7:21 AM  
Result Value Ref Range Glucose (POC) 122 (H) 65 - 100 mg/dL Performed by Roe Gunderson   
HGB & HCT Collection Time: 12/19/19 10:09 AM  
Result Value Ref Range HGB 7.0 (L) 12.1 - 17.0 g/dL HCT 22.4 (L) 36.6 - 50.3 % Assessment: · Anemia: EGD (11/11/19) normal.  Colonoscopy (11/11/19) moderate diverticulosis in the descending and sigmoid colon; otherwise, normal.  Hemoccult positive 12/17, negative 12/14. Recurrent gross hematuria - urology following. Right anterior epistaxis - ENT evaluated on 12/15. No signs of active GI bleeding. Hgb 7.0, INR 2.1. Last unit PRBCs on 12/18/19. 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  
Diagnosis Code  Paroxysmal atrial fibrillation (HCC) I48.0  Acute on chronic systolic CHF (congestive heart failure) (HCC) I50.23  Systolic CHF, chronic (HCC) I50.22  
 Peripheral vascular disease (HCC) I73.9  Acute decompensated heart failure (HCC) I50.9 Plan: · PPI · Monitor CBC and transfuse as necessary. No evidence of GI bleeding. · We will sign off and be available again as needed. Please call us with any questions. Thank you. Signed By: MALCOLM Tong   
 12/19/2019  11:15 AM 
  
 
 
Will follow as needed Please call for any questions

## 2019-12-19 NOTE — PROGRESS NOTES
0730: Bedside shift change report given to Alex Trejo 90 (oncoming nurse) by Kelsy Simmons RN (offgoing nurse). Report included the following information SBAR and Kardex. 1930: Bedside shift change report given to [address] (oncoming nurse) by [address] RN (offgoing nurse). Report included the following information SBAR and Kardex Eleelene Gajean-claude Problem: Falls - Risk of 
Goal: *Absence of Falls Description Document Heatherlashaelucie Honoriojordin Fall Risk and appropriate interventions in the flowsheet. 12/19/2019 1102 by Papiruse Mode Outcome: Progressing Towards Goal 
Note: Fall Risk Interventions: 
Mobility Interventions: Communicate number of staff needed for ambulation/transfer Mentation Interventions: Door open when patient unattended Medication Interventions: Evaluate medications/consider consulting pharmacy, Patient to call before getting OOB Elimination Interventions: Call light in reach History of Falls Interventions: Door open when patient unattended 12/19/2019 1101 by SLEDVision Mode Outcome: Progressing Towards Goal 
Note: Fall Risk Interventions: 
Mobility Interventions: Communicate number of staff needed for ambulation/transfer Mentation Interventions: Door open when patient unattended Medication Interventions: Evaluate medications/consider consulting pharmacy, Patient to call before getting OOB Elimination Interventions: Call light in reach History of Falls Interventions: Door open when patient unattended Problem: Pressure Injury - Risk of 
Goal: *Prevention of pressure injury Description Document Mich Scale and appropriate interventions in the flowsheet. 12/19/2019 1102 by Felicie Mode Outcome: Progressing Towards Goal 
Note: Pressure Injury Interventions: 
Sensory Interventions: Chair cushion, Check visual cues for pain, Float heels, Keep linens dry and wrinkle-free, Maintain/enhance activity level, Minimize linen layers, Pressure redistribution bed/mattress (bed type) Moisture Interventions: Absorbent underpads Activity Interventions: Increase time out of bed, Pressure redistribution bed/mattress(bed type), PT/OT evaluation Mobility Interventions: HOB 30 degrees or less Nutrition Interventions: Document food/fluid/supplement intake Friction and Shear Interventions: Apply protective barrier, creams and emollients, HOB 30 degrees or less 12/19/2019 1101 by Les Cancer Outcome: Progressing Towards Goal 
Note: Pressure Injury Interventions: 
Sensory Interventions: Chair cushion, Check visual cues for pain, Float heels, Keep linens dry and wrinkle-free, Maintain/enhance activity level, Minimize linen layers, Pressure redistribution bed/mattress (bed type) Moisture Interventions: Absorbent underpads Activity Interventions: Increase time out of bed, Pressure redistribution bed/mattress(bed type), PT/OT evaluation Mobility Interventions: HOB 30 degrees or less Nutrition Interventions: Document food/fluid/supplement intake Friction and Shear Interventions: Apply protective barrier, creams and emollients, HOB 30 degrees or less Problem: Heart Failure: Discharge Outcomes Goal: *Demonstrates ability to perform prescribed activity without shortness of breath or discomfort 12/19/2019 1102 by Les Cancer Outcome: Progressing Towards Goal 
12/19/2019 1101 by Les Cancer Outcome: Progressing Towards Goal 
  
Problem: Patient Education: Go to Patient Education Activity Goal: Patient/Family Education 12/19/2019 1102 by Les Cancer Outcome: Progressing Towards Goal 
12/19/2019 1101 by Les Cancer Outcome: Progressing Towards Goal 
  
Problem: LVAD Post-op Day 15 to Discharge Goal: Off Pathway (Use only if patient is Off Pathway) 12/19/2019 1102 by Les Cancer Outcome: Progressing Towards Goal 
12/19/2019 1101 by Les Cancer Outcome: Progressing Towards Goal

## 2019-12-19 NOTE — PROGRESS NOTES
ID Progress Note 2019 Subjective:  
 
Bleeding issues seem improved Objective: Antibiotics: 1. Levaquin 2. Rifampin 3. Fluconazole 4. Vancomycin 5. Cefazolin 6. Zosyn Vitals:  
Visit Vitals BP 91/72 (BP 1 Location: Left arm, BP Patient Position: At rest) Pulse 77 Temp 97.6 °F (36.4 °C) Resp 18 Ht 6' 2\" (1.88 m) Wt 79.6 kg (175 lb 7.8 oz) SpO2 99% BMI 22.53 kg/m² Tmax:  Temp (24hrs), Av.9 °F (36.6 °C), Min:97.5 °F (36.4 °C), Max:98.3 °F (36.8 °C) Exam:  Lungs:  clear to auscultation bilaterally Heart:  regular rate and rhythm Abdomen:  soft, non-tender. Bowel sounds normal. No masses,  no organomegaly Labs:     
Recent Labs 19 
1607 19 
1009 19 
0236 19 
1836 19 
1001 19 
0406  19 
5404 WBC  --   --  4.9  --   --  4.9  --  4.7 HGB 7.1* 7.0* 7.0* 7.3* 7.9* 6.8*   < > 5.8* PLT  --   --  151  --   --  158  --  171 BUN  --   --  40*  --   --  41*  --  44* CREA  --   --  2.01*  --   --  2.09*  --  2.13* SGOT  --   --  16  --   --  14*  --  14* AP  --   --  101  --   --  82  --  84 TBILI  --   --  0.7  --   --  0.6  --  0.4  
 < > = values in this interval not displayed. Cultures: No results found for: DANIELLE Lab Results Component Value Date/Time Culture result: NO GROWTH 4 DAYS 12/15/2019 03:37 PM  
 Culture result: NO GROWTH 1 DAY 12/15/2019 03:30 PM  
 Culture result: NO GROWTH 5 DAYS 2019 11:23 PM  
 
 
Radiology:  
 
Line/Insert Date:        
 
Assessment:  
 
1. UTI--Serratia (2 biotypes) from culture and small amount of Enterococcus 2. CHF/cardiomyopathy 3. ?aspiration event(s) 4. Renal insufficiency 5. Respiratory difficulties Objective: 1. Continue current therapy Camacho Lazcano MD

## 2019-12-20 NOTE — PROGRESS NOTES
Urology Progress Note Patient: Priyanka Arora MRN: [id number]  SSN: [id number] YOB: 1950  Age: 71 y.o. Sex: male ADMITTED: 10/25/2019 to Corin Odell MD for Acute decompensated heart failure ([facility] 75.) [I50.9] POD# 25 Days Post-Op Procedure(s): LEFT BRACHIAL THROMBECTOMY Follow-up re gross hematuria. Comfortable. No complaints. Urine light pink to clear with moderate CBI going. No clots. Vitals: Temp (24hrs), Av.1 °F (36.7 °C), Min:97.5 °F (36.4 °C), Max:98.5 °F (36.9 °C) Blood pressure 91/72, pulse 83, temperature 98.3 °F (36.8 °C), resp. rate 20, height 6' 2\" (1.88 m), weight 77.8 kg (171 lb 8.3 oz), SpO2 98 %. Intake and Output: 
 1901 -  0700 In: 633023.8 [P.O.:1540; I.V.:176.2] Out: 04087 Abdomen soft. Bladder not distended. Labs: 
Labs:  
Lab Results Component Value Date/Time WBC 5.1 2019 12:14 AM  
 HGB 6.8 (L) 2019 12:14 AM  
 Creatinine 1.92 (H) 2019 12:14 AM  
 
 
 
Assessment/Plan: 
 Hematuria improving. Continue to wean CBI. Signed By: Kerline Larson MD - 2019

## 2019-12-20 NOTE — PROGRESS NOTES
JESÚS Plan: 
   Patient s/p LVAD, hematuria, acute respiratory failure post op Disposition:  Inpatient Rehab; patient slowly progressing to tolerate 3hrs of therapy per day Patient is slowly progressing medically. CBI is being weaned to help with hematuria. Patient on 0.2mcg of milrinone today; slow wean of inotrope. Patient not medically stable for transfer to inpatient rehab today. CM will discuss JESÚS plan with treatment team and family next week as patient progresses. CM will continue to follow.  
 
Phil Jules MPH

## 2019-12-20 NOTE — PROGRESS NOTES
[facility] 
 [address] [facility], [facility] [address] [facility] Phone: [phone]   [phone]   
  
Nephrology Progress Note Sona Kiser     1950     [id number] Date of Admission : 10/25/2019 
12/20/19 CC:  Follow up for JUAN J, CKD, Hyponatremia Assessment and Plan JUAN J on CKD: 
- CXR wet after blood transfusion  
- changed Bumex back to 2 mg IV BID  
- daily labs Hyponatremia : 
- 2/2 CHF vs fluid overload / water retention from CBI  
- IV diuresis and watch Gross hematuria, BPH w/ retention: 
- off CBI pre VAD. cysto pre op showed catheter related Cystitis @ postr wall  
- CBI for recurrent hematuria  
- wean CBI per urology and remove neal over weekend Hypokalemia  
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  
- unable to get CTA/MRA 
- On [facility] Acute on Chronic HFrEF  
- EF 16-20%, NYHA Class IV , hx of VF arrest - s/p AICD 
- Impella insertion 10/29- removed 11/18  
- s/p LVAD placement on 11/18 
- s/p right thoracentesis. CT in place Hoarseness, vocal cord paralysis, mediastinal adenopathy: 
- will need eventual PET/CT 
  
L arm clot s/p thrombectomy on 11/25 JOSE on CPAP  
  
PAF s/p DCCV 6/19 
  
Anemia: 
- from blood loss s/p multiple blood products - s/p IV iron,  Repeat iron studies ok 
- on PAVEL q7 d Serratia and Enteroccocus UTI: 
- completed abx Interval History:   
Seen and examined He reports bladder pain w/ hand irrigation Urine clearing Hb down to 7 SOB - mild Review of Systems: as per HPI Current Medications:  
Current Facility-Administered Medications Medication Dose Route Frequency  0.9% sodium chloride infusion 250 mL  250 mL IntraVENous PRN  
 0.9% sodium chloride infusion 250 mL  250 mL IntraVENous PRN  
 bumetanide (BUMEX) injection 2 mg  2 mg IntraVENous Q12H  
 0.9% sodium chloride infusion 250 mL  250 mL IntraVENous PRN  
  therapeutic multivitamin (THERAGRAN) tablet 1 Tab  1 Tab Oral DAILY  dronabinol (MARINOL) capsule 2.5 mg  2.5 mg Oral BID  escitalopram oxalate (LEXAPRO) tablet 10 mg  10 mg Oral QPM  
 potassium chloride SR (KLOR-CON 10) tablet 40 mEq  40 mEq Oral DAILY  alteplase (CATHFLO) 1 mg in sterile water (preservative free) 1 mL injection  1 mg InterCATHeter PRN  
 0.9% sodium chloride infusion 250 mL  250 mL IntraVENous PRN  finasteride (PROSCAR) tablet 5 mg  5 mg Oral DAILY  dronabinol (MARINOL) capsule 2.5 mg  2.5 mg Oral DAILY  0.9% sodium chloride infusion 250 mL  250 mL IntraVENous PRN  
 0.9% sodium chloride infusion 250 mL  250 mL IntraVENous PRN  
 albuterol-ipratropium (DUO-NEB) 2.5 MG-0.5 MG/3 ML  3 mL Nebulization Q6H RT  
 oxymetazoline (AFRIN) 0.05 % nasal spray 2 Spray  2 Spray Both Nostrils BID  spironolactone (ALDACTONE) tablet 25 mg  25 mg Oral DAILY  clonazePAM (KlonoPIN) tablet 0.5 mg  0.5 mg Oral TID PRN  
 milrinone (PRIMACOR) 20 MG/100 ML D5W infusion  0.25 mcg/kg/min IntraVENous CONTINUOUS  
 sildenafil (pulm.hypertension) (REVATIO) tablet 40 mg  40 mg Oral TID  magnesium oxide (MAG-OX) tablet 400 mg  400 mg Oral BID  diphenhydrAMINE (BENADRYL) capsule 25 mg  25 mg Oral QHS PRN  
 octreotide (SANDOSTATIN) injection 25 mcg  25 mcg SubCUTAneous TID  benzocaine-menthol (CEPACOL) lozenge 1 Lozenge  1 Lozenge Mucous Membrane PRN  
 digoxin (LANOXIN) tablet 0.0625 mg  62.5 mcg Oral Q MON, WED & FRI  levETIRAcetam (KEPPRA) oral solution 250 mg  250 mg Oral Q12H  pantoprazole (PROTONIX) tablet 40 mg  40 mg Oral ACB  
 0.9% sodium chloride infusion  3 mL/hr IntraVENous CONTINUOUS  
 allopurinol (ZYLOPRIM) tablet 100 mg  100 mg Oral DAILY  arformoterol (BROVANA) neb solution 15 mcg  15 mcg Nebulization BID RT  And  
 budesonide (PULMICORT) 500 mcg/2 ml nebulizer suspension  500 mcg Nebulization BID RT  
  artificial saliva (MOUTH KOTE) 1 Spray  1 Spray Oral PRN  
 lactobac ac& pc-s.therm-b.anim (TIAN Q/RISAQUAD)  1 Cap Oral DAILY  oxyCODONE IR (ROXICODONE) tablet 5 mg  5 mg Oral Q6H PRN  
 balsam peru-castor oil (VENELEX) ointment   Topical TID  tamsulosin (FLOMAX) capsule 0.4 mg  0.4 mg Oral DAILY  insulin lispro (HUMALOG) injection   SubCUTAneous AC&HS  Warfarin MD/NP dosing   Other PRN  
 epoetin yvonne-epbx (RETACRIT) injection 20,000 Units  20,000 Units SubCUTAneous Q7D  
 sodium chloride (NS) flush 5-40 mL  5-40 mL IntraVENous Q8H  
 sodium chloride (NS) flush 5-40 mL  5-40 mL IntraVENous PRN  
 acetaminophen (TYLENOL) tablet 650 mg  650 mg Oral Q6H PRN  
 naloxone (NARCAN) injection 0.4 mg  0.4 mg IntraVENous PRN  
 ondansetron (ZOFRAN) injection 4 mg  4 mg IntraVENous Q4H PRN  
 senna-docusate (PERICOLACE) 8.6-50 mg per tablet 1 Tab  1 Tab Oral DAILY  bisacodyl (DULCOLAX) tablet 5 mg  5 mg Oral DAILY PRN  
 sucralfate (CARAFATE) tablet 1 g  1 g Oral AC&HS  influenza vaccine 2019-20 (6 mos+)(PF) (FLUARIX/FLULAVAL/FLUZONE QUAD) injection 0.5 mL  0.5 mL IntraMUSCular PRIOR TO DISCHARGE  hydrALAZINE (APRESOLINE) 20 mg/mL injection 20 mg  20 mg IntraVENous Q6H PRN  
 dextrose 10 % infusion 125-250 mL  125-250 mL IntraVENous PRN No Known Allergies Objective: 
Vitals:   
Vitals:  
 12/20/19 0405 12/20/19 0430 12/20/19 0530 12/20/19 0732 BP:      
Pulse: 83 82 83 Resp: 18  20 Temp: 98.2 °F (36.8 °C) 98.3 °F (36.8 °C) 98.3 °F (36.8 °C) SpO2: 100% 99% 98% 99% Weight:   77.8 kg (171 lb 8.3 oz) Height:      
 
Intake and Output: 
12/20 0701 - 12/20 1900 In: -  
Out: 850  
12/18 1901 - 12/20 0700 In: 777495.8 [P.O.:1540; I.V.:176.2] Out: 32271 Physical Examination: 
 
General: Elderly man in no acute distress HEENT:           [place]:  No lines Resp:  Rales + CV:  VAD sounds; No LE edema GI:  Soft and non-tender; no distension Neurologic:  Alert and appropriate; normal speech :  + neal; gross hematuria [x]    High complexity decision making was performed 
[x]    Patient is at high-risk of decompensation with multiple organ involvement Lab Data Personally Reviewed: I have reviewed all the pertinent labs, microbiology data and radiology studies during assessment. Recent Labs  
  12/20/19 
0014 12/19/19 
0236 12/18/19 
0406 * 134* 133* K 4.1 3.7 3.6 CL 93* 94* 95* CO2 36* 36* 35* * 142* 96 BUN 38* 40* 41* CREA 1.92* 2.01* 2.09* CA 8.7 8.7 8.7 MG 1.8 1.9 1.9 ALB 2.2* 2.2* 2.3*  
SGOT 21 16 14* ALT 12 11* 11* INR 1.3* 2.1* 3.5* Recent Labs  
  12/20/19 
0014 12/19/19 
1607 12/19/19 
1009 12/19/19 
0236 12/18/19 
1836  12/18/19 
0406 WBC 5.1  --   --  4.9  --   --  4.9 HGB 6.8* 7.1* 7.0* 7.0* 7.3*   < > 6.8* HCT 21.6* 22.4* 22.4* 22.2* 23.2*   < > 21.9*  
  --   --  151  --   --  158  
 < > = values in this interval not displayed. No results found for: SDES Lab Results Component Value Date/Time Culture result: NO GROWTH 5 DAYS 12/15/2019 03:37 PM  
 Culture result: NO GROWTH 1 DAY 12/15/2019 03:30 PM  
 Culture result: NO GROWTH 5 DAYS 12/06/2019 11:23 PM  
 Culture result: HEAVY ENTEROCOCCUS SPECIES NOTED (A) 11/27/2019 11:10 AM  
 Culture result: LIGHT YEAST (A) 11/27/2019 11:10 AM  
 
Recent Results (from the past 24 hour(s)) HGB & HCT Collection Time: 12/19/19 10:09 AM  
Result Value Ref Range HGB 7.0 (L) 12.1 - 17.0 g/dL HCT 22.4 (L) 36.6 - 50.3 % GLUCOSE, POC Collection Time: 12/19/19 11:42 AM  
Result Value Ref Range Glucose (POC) 167 (H) 65 - 100 mg/dL Performed by Deanne Tanner   
HGB & HCT Collection Time: 12/19/19  4:07 PM  
Result Value Ref Range HGB 7.1 (L) 12.1 - 17.0 g/dL HCT 22.4 (L) 36.6 - 50.3 % GLUCOSE, POC Collection Time: 12/19/19  4:08 PM  
Result Value Ref Range Glucose (POC) 140 (H) 65 - 100 mg/dL Performed by Lyman Severance, POC Collection Time: 12/19/19  9:20 PM  
Result Value Ref Range Glucose (POC) 113 (H) 65 - 100 mg/dL Performed by Wilberto Soria PROCALCITONIN Collection Time: 12/20/19 12:14 AM  
Result Value Ref Range Procalcitonin 0.14 ng/mL METABOLIC PANEL, COMPREHENSIVE Collection Time: 12/20/19 12:14 AM  
Result Value Ref Range Sodium 131 (L) 136 - 145 mmol/L Potassium 4.1 3.5 - 5.1 mmol/L Chloride 93 (L) 97 - 108 mmol/L  
 CO2 36 (H) 21 - 32 mmol/L Anion gap 2 (L) 5 - 15 mmol/L Glucose 142 (H) 65 - 100 mg/dL BUN 38 (H) 6 - 20 MG/DL Creatinine 1.92 (H) 0.70 - 1.30 MG/DL  
 BUN/Creatinine ratio 20 12 - 20 GFR est AA 42 (L) >60 ml/min/1.73m2 GFR est non-AA 35 (L) >60 ml/min/1.73m2 Calcium 8.7 8.5 - 10.1 MG/DL Bilirubin, total 0.5 0.2 - 1.0 MG/DL  
 ALT (SGPT) 12 12 - 78 U/L  
 AST (SGOT) 21 15 - 37 U/L Alk. phosphatase 98 45 - 117 U/L Protein, total 6.2 (L) 6.4 - 8.2 g/dL Albumin 2.2 (L) 3.5 - 5.0 g/dL Globulin 4.0 2.0 - 4.0 g/dL A-G Ratio 0.6 (L) 1.1 - 2.2 MAGNESIUM Collection Time: 12/20/19 12:14 AM  
Result Value Ref Range Magnesium 1.8 1.6 - 2.4 mg/dL CBC W/O DIFF Collection Time: 12/20/19 12:14 AM  
Result Value Ref Range WBC 5.1 4.1 - 11.1 K/uL  
 RBC 2.34 (L) 4.10 - 5.70 M/uL HGB 6.8 (L) 12.1 - 17.0 g/dL HCT 21.6 (L) 36.6 - 50.3 % MCV 92.3 80.0 - 99.0 FL  
 MCH 29.1 26.0 - 34.0 PG  
 MCHC 31.5 30.0 - 36.5 g/dL  
 RDW 16.8 (H) 11.5 - 14.5 % PLATELET 994 605 - 008 K/uL MPV 9.8 8.9 - 12.9 FL  
 NRBC 0.0 0  WBC ABSOLUTE NRBC 0.00 0.00 - 0.01 K/uL PROTHROMBIN TIME + INR Collection Time: 12/20/19 12:14 AM  
Result Value Ref Range INR 1.3 (H) 0.9 - 1.1 Prothrombin time 13.0 (H) 9.0 - 11.1 sec PTT Collection Time: 12/20/19 12:14 AM  
Result Value Ref Range  aPTT 31.7 22.1 - 32.0 sec  
 aPTT, therapeutic range     58.0 - 77.0 SECS  
NT-PRO BNP  
 Collection Time: 12/20/19 12:14 AM  
Result Value Ref Range NT pro-BNP 2,569 (H) <125 PG/ML  
DIGOXIN Collection Time: 12/20/19 12:14 AM  
Result Value Ref Range Digoxin level 0.9 0.90 - 2.00 NG/ML  
LD Collection Time: 12/20/19 12:14 AM  
Result Value Ref Range  85 - 241 U/L  
LACTIC ACID Collection Time: 12/20/19 12:25 AM  
Result Value Ref Range Lactic acid 1.7 0.4 - 2.0 MMOL/L  
GLUCOSE, POC Collection Time: 12/20/19  6:17 AM  
Result Value Ref Range Glucose (POC) 135 (H) 65 - 100 mg/dL Performed by Mendel Bond (PCT) Geo Angela MD

## 2019-12-20 NOTE — PROGRESS NOTES
1930 Bedside and Verbal shift change report given to Delmer Lopes (oncoming nurse) by Junior Greenberg RN (offgoing nurse). Report included the following information SBAR, Kardex, Procedure Summary, Intake/Output, MAR, Cardiac Rhythm Paced, and Alarm Parameters . Problem: Falls - Risk of 
Goal: *Absence of Falls Description Document Vera Latin Fall Risk and appropriate interventions in the flowsheet. Outcome: Progressing Towards Goal 
Note: Fall Risk Interventions: 
Mobility Interventions: Communicate number of staff needed for ambulation/transfer Mentation Interventions: Door open when patient unattended Medication Interventions: Evaluate medications/consider consulting pharmacy, Patient to call before getting OOB Elimination Interventions: Call light in reach History of Falls Interventions: Door open when patient unattended 0730 Bedside and Verbal shift change report given to Colin Velez RN (oncoming nurse) by Opal Burrows RN (offgoing nurse). Report included the following information SBAR, Kardex, Procedure Summary, MAR, Cardiac Rhythm Paced, and Alarm Parameters .

## 2019-12-20 NOTE — PROGRESS NOTES
0730 Bedside shift change report given to Carrie Chin, RN and Moiz Bain RN (oncoming nurse) by KATRIN Cooper (offgoing nurse). Report included the following information SBAR, Kardex, Intake/Output, MAR and Recent Results. 1300 LVAD dressing change performed. Wife verbalized understanding of procedure.

## 2019-12-20 NOTE — PROGRESS NOTES
[address] [facility] 904 [facility] in [place], [place] Inpatient Progress Note Patient name: Milagro Holley Patient : 1950 Patient MRN: [id number] Attending MD: Beth Gee MD 
Date of service: 19 Chief Complaint:  
Laila Bahamian azucena LVAD implant 
  
HPI: Sona Kiser is a 71y.o. year old pleasant white male with a history of HTN, HLD, JOSE, CAD s/p cardiac arrest VFib s/p CABG () c/b sternal would infection and sternectomy, ischemic cardiomyopathy LVEF 15-20%, s/p ICD and with LBBB. He was admitted with acute on chronic systolic heart failure with massive volume overload > 20 lbs, in the setting of atrial fibrillation s/p failed DCCV and underwent DCCV and RHC on .  S/p BiVICD on 19 with Dr. Pat Ricardo. He was discharged home home on IV milrinone on 19. Tammy Shepherd has been followed closely by Dr. Trace Schmidt and the [facility]. 
  
Mr. Kiser was admitted for acute on chronic systolic heart failure. He underwent implantation of Impella 5.0 due to CS and has completed his LVAD evaluation. Tammy Shepherd meets criteria for implant of HM3 as DT. He was NYHA Class IV prior to implant of Impella 5.0, has LVEF < 15%, was intolerant of GDMT due to symptomatic hypotension and renal dysfunction. He remains dependent on temporary MCS support. RHC  revealed compensated hemodynamics on Impella. His renal function has improved dramatically with improvement in his hemodynamics. He is not a suitable transplant candidate due to single kidney, sternectomy, debility, and frailty. He was reviewed by [facility] and felt to be a high risk heart transplant candidate due to multiple co-morbidities as well as the afore mentioned conditions.  He remained in the CCU and underwent a HM 3 implant as DT on . He was weaned off of pressors and transferred to [place] [address] where he continues to undergo PT/OT and hemodynamic optimization.   
  
24Hr Events Continues to have hematuria CBI ongoing Hgb 7 Increased Marinol Procedure:  Procedure(s): REMOVAL TEMPORARY LEFT VENTRICULAR ASSIST DEVICE, IMPLANTATION OF LEFT VENTRICULAR ASSIST DEVICE PERMANENT (VAD), ECC, JACQUE, EPI AORTIC US BY DR Amarjit Krishnan.    
POD:  31 
  
Impression / Plan:  
Heart Failure Status: NYHA Class IV Chronic systolic heart failure due to ICM, NYHA Class IV, EF < 15%, cardiogenic shock bridged with Impella 5.0 support to HM 3 implant S/p Impella removal and LVAD implant 11/18/19 RPMs 6400 rpm  
TTE with bubble study negative for PFO Decrease milrinone to 0.2mcg/kg/min Obtain daily CardioMEMs readings when in stepdown unit Continue Bumex 2 mg IV BID Cont Sildenafil to 40 mg PO TID - rx sent to local pharmacy to initiate PA Intolerant of BB due to RV failure Intolerant of ACEi/ARB/ARNI/AA due to JUAN J on CKD 3 Strict I/O, daily weights, Na+ restricted diet Continue LVAD education Daily dressing changes until POD 30 TTE 12/16- EF 15-20% QTc 374 12/18/19 Generalized myoclonus Improved Appreciate Neurology input Continue Keppra with renal dosing Head CT negative for acute process EEG suggestive of mild generalized encephalopathic process, possibly related to underlying structural brain injury vs metabolic abnormality Monitor closely  
  
Anticoagulation for LVAD, INR goal 2-3 DC ASA d/t hematuria INR 1.3 Cont coumadin Epistaxis Resolved Afrin soaked guaze ENT consult appreciated Monitor for now  
  
Acute Respiratory Failure post op Improved with aggressive diuresis, but still c/o dyspnea CXR and O2 requirement improved ABG acceptable TTE with Bubble study negative for PFO Pulmonary Consult appreciated Pulmonary hygiene Cont home CPAP- must use while sleeping  
 milrinone dose to 0.2 mcgs/kg/min Acute on CKD 3, atrophic left kidney Appreciate Nephrology assistance Watch Cr - improving Daily diuretic dosing Avoid nephrotoxins, trend labs Renally dose meds  
  
CAD s/p CABG  
 Cont low dose ASA- watch platelets No BB d/t RV failure Hold statin due to recent hepatic failure LHC performed 11/13 - low likelihood of benefit from revascularization  
  
Hx of VF arrest s/p BiV-ICD No recent shocks Keep K > 4 and Mg > 2  
   
PAF Dig level 0.9 -cont dig (62.5 mcg qMWF) No BB due to RV failure Repeat TFTs WNL 
  
Hx of gout Uric acid WNL Acute blood loss anemia Likely due to hematuria Cont octreotide 25 mcg SQ TID Transfuse to keep Hgb > 7.0 Transfuse today Fecal occult 12/14 negative, positive on 12/17 Appreciate urology recommendations Cont CBI GI following, low suspicion for GIB 
  
Suspected aspiration pneumonia Sputum culture showing scant growth of yeast 
Cefepime until 12/19 Urinary retention, c/b serratia UTI and hematuria Appreciate Urology consult - asked to see again due to new hematuria Repeat UA with cx  
 cefepime Cystoscopy performed 11/13 shows catheter induced cystitis Sepsis Alert Paired Blood Neg 
Urine cx negative Sputum cx when able Malnutrition Appreciate Nutritionist consult Prealbumin weekly - 13 on 12/16 Diet as tolerated Intolerant of mirtazapine d/t tremors, confusion Cont  Marinol Cont MVI 
  
COPD Appreciate Pulmonology assistance Continue nebs PRN   
  
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  
  
Hx of sternal wound infection, sternectomy Sternum closed post op- monitor  
  
Vocal cord paralysis Improved ENT recs appreciated Neck CT- R neck edema, no airway compromise Speech therapy following - appreciate input Anxiety Klonipin 0.5 mg TID prn anxiety Depression Cont lexapro 10 mg qpm 
Monitor QTc Monitor sodium  
  
Debility Continue PT/OT  
  
Ppx Protonix PT/OT Bowel regimen PICC placed 11/22/19  
  
Dispo: Will need IP rehab. Not ready for discharge at this time Patient seen and examined. Data and note reviewed. I have discussed and agree with the plans as noted. 71year old male with a history of ICM, s/p LVAD as DT whose post op course has been complicated by RV failure, JUAN J on CKD, myoclonus, suspected CVA with diplopia, debility, and depression. He is tolerating a slow IV milrinone wean. Will start lexapro for depression - monitor serum sodium and QTc closely. He will need inpatient rehab. Thank you for allowing us to participate in your patient's care. Mounika Lindquist MD, Carmen Ontiveros Chief of Cardiology, [facility] Medical Director Calos Tito Rueda [address], [address] Office [phone] Fax [phone] LVAD INTERROGATION: 
Device interrogated in person Device function normal, normal flow, no PI events LVAD Pump Speed (RPM): 6400 Pump Flow (LPM): 5.4 MAP: 80 
PI (Pulsitility Index): 5.4 Power: 5.8  Test: No 
Back Up  at Bedside & Labeled: Yes Power Module Test: No 
Driveline Site Care: No 
Driveline Dressing: Clean, Dry, and Intact Outpatient: No 
MAP in Therapeutic Range (Outpatient): Yes Testing Alarms Reviewed: Yes 
Back up SC speed: 6400 Back up Low Speed Limit: 6000 Emergency Equipment with Patient?: Yes Emergency procedures reviewed?: Yes Drive line site inspected?: No(covered by dressing) Drive line intergrity inspected?: Yes Drive line dressing changed?: No 
 
PHYSICAL EXAM: 
Visit Vitals BP 91/72 (BP 1 Location: Left arm, BP Patient Position: At rest) Pulse 73 Temp 97.5 °F (36.4 °C) Resp 21 Ht 6' 2\" (1.88 m) Wt 171 lb 8.3 oz (77.8 kg) SpO2 100% BMI 22.02 kg/m² Physical Exam  
Constitutional: He is oriented to person, place, and time. He appears well-developed. He appears cachectic. No distress. OOB, working with PT  
HENT:  
Head: Normocephalic. Neck: Normal range of motion. Neck supple. No JVD present. Cardiovascular: Normal rate, regular rhythm and normal heart sounds. + VAD hum Pulmonary/Chest: Effort normal and breath sounds normal. No respiratory distress. Abdominal: Soft. Bowel sounds are normal. He exhibits no distension. Dobhoff in place Genitourinary:    Genitourinary Comments: Hematuria Musculoskeletal: Normal range of motion. General: No edema. Neurological: He is alert and oriented to person, place, and time. Skin: Skin is warm and dry. Nursing note and vitals reviewed. REVIEW OF SYSTEMS: 
Review of Systems Constitutional: Positive for malaise/fatigue. Negative for chills and fever. HENT: Positive for hearing loss. Negative for nosebleeds. Respiratory: Negative for cough and shortness of breath. Mild dyspnea Cardiovascular: Negative for chest pain, palpitations, orthopnea and leg swelling. Gastrointestinal: Negative for heartburn, nausea and vomiting. Genitourinary: Positive for hematuria. Musculoskeletal: Negative. Neurological: Positive for weakness. Negative for dizziness and headaches. Endo/Heme/Allergies: Bruises/bleeds easily. PAST MEDICAL HISTORY: 
Past Medical History:  
Diagnosis Date  Degenerative disc disease, lumbar  Heart failure ([facility] [address]  High cholesterol  Hypertension  Paroxysmal atrial fibrillation ([facility] [address] 4/2/2019  Spinal stenosis PAST SURGICAL HISTORY: 
Past Surgical History:  
Procedure Laterality Date  COLONOSCOPY Left 11/11/2019 COLONOSCOPY at bedside performed by Cori Lozano MD at [address] [place] [address]  HX CORONARY ARTERY BYPASS GRAFT    
 triple  HX HERNIA REPAIR    
 HX IMPLANTABLE CARDIOVERTER DEFIBRILLATOR  MD CARDIOVERSION ELECTIVE ARRHYTHMIA EXTERNAL N/A 6/10/2019  EP CARDIOVERSION performed by Carrol Barker MD at [address] CATH LAB  
  TN CARDIOVERSION ELECTIVE ARRHYTHMIA EXTERNAL N/A 2019 EP CARDIOVERSION performed by Diego Juarez MD at [address] CATH LAB  TN INSJ/RPLCMT PERM DFB W/TRNSVNS LDS 1/DUAL CHMBR N/A 2019 INSERT ICD BIV MULTI performed by Teofilo Pugh MD at [address] CATH LAB  TN TCAT IMPL WRLS P-ART PRS SNR L-T HEMODYN MNTR N/A 2019 IMPLANT HEART FAILURE MONITORING DEVICE performed by Jennifer Grace MD at [address] CATH LAB FAMILY HISTORY: 
Family History Problem Relation Age of Onset  Lung Disease Mother  Hypertension Mother [facility] Arthritis-osteo Mother  Heart Disease Mother  Heart Disease Father  Diabetes Father SOCIAL HISTORY: 
Social History Socioeconomic History  Marital status:  Spouse name: Not on file  Number of children: Not on file  Years of education: Not on file  Highest education level: Not on file Tobacco Use  Smoking status: Former Smoker Last attempt to quit: 2010 Years since quittin.0  Smokeless tobacco: Never Used Substance and Sexual Activity  Alcohol use: Not Currently Comment: rarely  Drug use: Never Other Topics Concern LABORATORY RESULTS: 
  
Labs Latest Ref Rng & Units 2019 WBC 4.1 - 11.1 K/uL - 5.1 - - 4.9 - -  
RBC 4.10 - 5.70 M/uL - 2.34(L) - - 2.43(L) - - Hemoglobin 12.1 - 17.0 g/dL 7. 2(L) 6. 8(L) 7. 1(L) 7. 0(L) 7. 0(L) 7. 3(L) 7. 9(L) Hematocrit 36.6 - 50.3 % 22. 6(L) 21. 6(L) 22. 4(L) 22. 4(L) 22. 2(L) 23. 2(L) 24. 9(L) MCV 80.0 - 99.0 FL - 92.3 - - 91.4 - - Platelets 473 - 717 K/uL - 165 - - 151 - - Lymphocytes 12 - 49 % - - - - - - - Monocytes 5 - 13 % - - - - - - - Eosinophils 0 - 7 % - - - - - - - Basophils 0 - 1 % - - - - - - - Albumin 3.5 - 5.0 g/dL - 2. 2(L) - - 2. 2(L) - -  
Calcium 8.5 - 10.1 MG/DL - 8.7 - - 8.7 - -  
SGOT 15 - 37 U/L - 21 - - 16 - -  
 Glucose 65 - 100 mg/dL - 142(H) - - 142(H) - -  
BUN 6 - 20 MG/DL - 38(H) - - 40(H) - - Creatinine 0.70 - 1.30 MG/DL - 1.92(H) - - 2.01(H) - - Sodium 136 - 145 mmol/L - 131(L) - - 134(L) - - Potassium 3.5 - 5.1 mmol/L - 4.1 - - 3.7 - -  
TSH 0.36 - 3.74 uIU/mL - - - - - - -  
PSA 0.01 - 4.0 ng/mL - - - - - - -  
LDH 85 - 241 U/L - 192 - - 233 - -  
CEA ng/mL - - - - - - - Some recent data might be hidden Lab Results Component Value Date/Time TSH 2.30 12/03/2019 03:29 AM  
 TSH 2.40 10/25/2019 07:39 PM  
 TSH 2.45 06/01/2019 04:16 AM  
 
 
ALLERGY: 
No Known Allergies CURRENT MEDICATIONS: 
Current Facility-Administered Medications Medication Dose Route Frequency  0.9% sodium chloride infusion 250 mL  250 mL IntraVENous PRN  
 0.9% sodium chloride infusion 250 mL  250 mL IntraVENous PRN  
 bumetanide (BUMEX) injection 2 mg  2 mg IntraVENous Q12H  
 0.9% sodium chloride infusion 250 mL  250 mL IntraVENous PRN  
 warfarin (COUMADIN) tablet 3 mg  3 mg Oral ONCE  therapeutic multivitamin (THERAGRAN) tablet 1 Tab  1 Tab Oral DAILY  dronabinol (MARINOL) capsule 2.5 mg  2.5 mg Oral BID  escitalopram oxalate (LEXAPRO) tablet 10 mg  10 mg Oral QPM  
 potassium chloride SR (KLOR-CON 10) tablet 40 mEq  40 mEq Oral DAILY  alteplase (CATHFLO) 1 mg in sterile water (preservative free) 1 mL injection  1 mg InterCATHeter PRN  
 0.9% sodium chloride infusion 250 mL  250 mL IntraVENous PRN  finasteride (PROSCAR) tablet 5 mg  5 mg Oral DAILY  dronabinol (MARINOL) capsule 2.5 mg  2.5 mg Oral DAILY  0.9% sodium chloride infusion 250 mL  250 mL IntraVENous PRN  
 0.9% sodium chloride infusion 250 mL  250 mL IntraVENous PRN  
 albuterol-ipratropium (DUO-NEB) 2.5 MG-0.5 MG/3 ML  3 mL Nebulization Q6H RT  
 oxymetazoline (AFRIN) 0.05 % nasal spray 2 Spray  2 Spray Both Nostrils BID  spironolactone (ALDACTONE) tablet 25 mg  25 mg Oral DAILY  clonazePAM (KlonoPIN) tablet 0.5 mg  0.5 mg Oral TID PRN  
 milrinone (PRIMACOR) 20 MG/100 ML D5W infusion  0.2 mcg/kg/min IntraVENous CONTINUOUS  
 sildenafil (pulm.hypertension) (REVATIO) tablet 40 mg  40 mg Oral TID  magnesium oxide (MAG-OX) tablet 400 mg  400 mg Oral BID  diphenhydrAMINE (BENADRYL) capsule 25 mg  25 mg Oral QHS PRN  
 octreotide (SANDOSTATIN) injection 25 mcg  25 mcg SubCUTAneous TID  benzocaine-menthol (CEPACOL) lozenge 1 Lozenge  1 Lozenge Mucous Membrane PRN  
 digoxin (LANOXIN) tablet 0.0625 mg  62.5 mcg Oral Q MON, WED & FRI  levETIRAcetam (KEPPRA) oral solution 250 mg  250 mg Oral Q12H  pantoprazole (PROTONIX) tablet 40 mg  40 mg Oral ACB  
 0.9% sodium chloride infusion  3 mL/hr IntraVENous CONTINUOUS  
 allopurinol (ZYLOPRIM) tablet 100 mg  100 mg Oral DAILY  arformoterol (BROVANA) neb solution 15 mcg  15 mcg Nebulization BID RT And  
 budesonide (PULMICORT) 500 mcg/2 ml nebulizer suspension  500 mcg Nebulization BID RT  
 artificial saliva (MOUTH KOTE) 1 Spray  1 Spray Oral PRN  
 lactobac ac& pc-s.therm-b.anim (TIAN Q/RISAQUAD)  1 Cap Oral DAILY  oxyCODONE IR (ROXICODONE) tablet 5 mg  5 mg Oral Q6H PRN  
 balsam peru-castor oil (VENELEX) ointment   Topical TID  tamsulosin (FLOMAX) capsule 0.4 mg  0.4 mg Oral DAILY  insulin lispro (HUMALOG) injection   SubCUTAneous AC&HS  Warfarin MD/NP dosing   Other PRN  
 epoetin yvonne-epbx (RETACRIT) injection 20,000 Units  20,000 Units SubCUTAneous Q7D  
 sodium chloride (NS) flush 5-40 mL  5-40 mL IntraVENous Q8H  
 sodium chloride (NS) flush 5-40 mL  5-40 mL IntraVENous PRN  
 acetaminophen (TYLENOL) tablet 650 mg  650 mg Oral Q6H PRN  
 naloxone (NARCAN) injection 0.4 mg  0.4 mg IntraVENous PRN  
 ondansetron (ZOFRAN) injection 4 mg  4 mg IntraVENous Q4H PRN  
 senna-docusate (PERICOLACE) 8.6-50 mg per tablet 1 Tab  1 Tab Oral DAILY  bisacodyl (DULCOLAX) tablet 5 mg  5 mg Oral DAILY PRN  
  sucralfate (CARAFATE) tablet 1 g  1 g Oral AC&HS  influenza vaccine 2019-20 (6 mos+)(PF) (FLUARIX/FLULAVAL/FLUZONE QUAD) injection 0.5 mL  0.5 mL IntraMUSCular PRIOR TO DISCHARGE  hydrALAZINE (APRESOLINE) 20 mg/mL injection 20 mg  20 mg IntraVENous Q6H PRN  
 dextrose 10 % infusion 125-250 mL  125-250 mL IntraVENous PRN Thank you for allowing me to participate in this patient's care. TIERRA Rivas Rueda [address] [place] [address], [address] Phone: [phone] Fax: [phone]

## 2019-12-20 NOTE — PROGRESS NOTES
Problem: Self Care Deficits Care Plan (Adult) Goal: *Acute Goals and Plan of Care (Insert Text) Description FUNCTIONAL STATUS PRIOR TO ADMISSION: Patient was modified independent using a single point cane for functional mobility. Patient able to shower and dress himself. However, patient required assistance for household management from his wife. HOME SUPPORT: The patient lived alone with wife to provide assistance. Occupational Therapy Goals: 
 
Goals reviewed and continued 12/19/2019 OT weekly reassessment 12/6/19: goals modified 1. Patient will perform upper body dressing moderate assistance within 7 day(s). 2.  Patient will perform lower body dressing with moderate assistance within 7 day(s). 3.  Patient will perform grooming seated EOB with minimum assistance within 7 day(s). 4.  Patient will perform toilet transfers with minimum assistance within 7 day(s). 5.  Patient will perform all aspects of toileting with moderate assistance within 7 day(s). 6.  Patient will participate in upper extremity therapeutic exercise/activities with supervision/set-up-min A for 5 minutes within 7 day(s). 7.  Patient will utilize energy conservation techniques during functional activities with verbal cues within 7 day(s). 8. Patient will verbalize 3/5 LVAD components with min verbal cues within 7 day (s). OT weekly reassessment 11/29/19: goals modified below 1. Patient will perform upper body dressing including LVAD switchovers with moderate assistance within 7 day(s). 2.  Patient will perform lower body dressing with minimal assistance within 7 day(s). 3.  Patient will perform grooming in standing with minimum assistance within 7 day(s). 4.  Patient will perform toilet transfers with minimum assistance within 7 day(s). 5.  Patient will perform all aspects of toileting with minimal assistance within 7 day(s).  
6.  Patient will participate in upper extremity therapeutic exercise/activities with supervision/set-up for 5 minutes within 7 day(s). 7.  Patient will utilize energy conservation techniques during functional activities with verbal cues within 7 day(s). 8. Patient will verbalize LVAD terminology with verbal cues within 7 day (s). Goals reviewed and continued/modified as follows 11/20/19 s/p LVAD implantation 1. Patient will perform upper body dressing including LVAD switchovers with moderate assistance within 7 day(s). 2.  Patient will perform lower body dressing with minimal assistance within 7 day(s). 3.  Patient will perform grooming with supervision/setup within 7 day(s). 4.  Patient will perform toilet transfers supervision/setupmodified independence within 7 day(s). 5.  Patient will perform all aspects of toileting with minimal assistance within 7 day(s). 6.  Patient will participate in upper extremity therapeutic exercise/activities with supervision/set-up for 5 minutes within 7 day(s). 7.  Patient will utilize energy conservation techniques during functional activities with verbal cues within 7 day(s). 8. Patient will verbalize LVAD terminology with verbal cues within 7 day (s). Goals reviewed and continued/modified as follows 11/12/19 1. Patient will perform bathing with supervision/set-up within 7 day(s). 2.  Patient will perform lower body dressing with supervision/set-up within 7 day(s). 3.  Patient will perform grooming with modified independence within 7 day(s). 4.  Patient will perform toilet transfers with modified independence within 7 day(s). 5.  Patient will perform all aspects of toileting with supervision/set-up within 7 day(s). 6.  Patient will participate in upper extremity therapeutic exercise/activities with supervision/set-up for 5 minutes within 7 day(s). 7.  Patient will utilize energy conservation techniques during functional activities with verbal cues within 7 day(s). 8. Patient will verbalize LVAD terminology with verbal cues within 7 day (s) in preparation for implant. Continue all goals 10/30/19 post re-eval for Impella removal  
Initiated 10/28/2019 1. Patient will perform bathing with supervision/set-up within 7 day(s). 2.  Patient will perform lower body dressing with supervision/set-up within 7 day(s). 3.  Patient will perform grooming with modified independence within 7 day(s). 4.  Patient will perform toilet transfers with modified independence within 7 day(s). 5.  Patient will perform all aspects of toileting with supervision/set-up within 7 day(s). 6.  Patient will participate in upper extremity therapeutic exercise/activities with supervision/set-up for 5 minutes within 7 day(s). 7.  Patient will utilize energy conservation techniques during functional activities with verbal cues within 7 day(s). Outcome: Progressing Towards Goal 
 OCCUPATIONAL THERAPY TREATMENT Patient: Aaron Martinez (75 y.o. male) Date: 12/20/2019 Diagnosis: Acute decompensated heart failure ([facility] [address] [I50.9] <principal problem not specified> Procedure(s) (LRB): LEFT BRACHIAL THROMBECTOMY (Left) 25 Days Post-Op Precautions: Fall, Sternal(LVAD ) Chart, occupational therapy assessment, plan of care, and goals were reviewed. ASSESSMENT Patient continues with skilled OT services and is progressing towards goals. Patient continues to present with impulsivity, impaired coordination, impaired balance, decreased endurance (3L NC O2), decreased insight into deficits, decreased safety awareness, and generalized weakness s/p LVAD implantation with several post-op complications including need for L brachial thrombectomy POD 25 and patient now with continuous bladder irrigation 2* hematuria.  Continued education on LVAD terminology today (, power leads, and driveline) however after several repetitions, patient with poor carryover and with no understanding of LVAD equipment. Patient required increased physical assistance for [facility] transfers today (MAX A) and MAX A for pericare. Continue to recommend IPR post discharge to maximize patient safety and independence with ADL transfers and tasks. Current Level of Function Impacting Discharge (ADLs): MAX A toileting Other factors to consider for discharge: LVAD  III 
    
 
PLAN : 
Patient continues to benefit from skilled intervention to address the above impairments. Continue treatment per established plan of care. to address goals. Recommend with staff: OOB x 3 to chair; BUE cardiac exercises; LVAD education Recommend next OT session: standing ADLs Recommendation for discharge: (in order for the patient to meet his/her long term goals) Therapy 3 hours per day 5-7 days per week This discharge recommendation: 
Has been made in collaboration with the attending provider and/or case management IF patient discharges home will need the following DME: to be determined (TBD) SUBJECTIVE:  
Patient stated I have to go quit (to the bathroom).  OBJECTIVE DATA SUMMARY:  
Cognitive/Behavioral Status: 
Neurologic State: Alert Orientation Level: Oriented X4 Cognition: Appropriate for age attention/concentration Perception: Appears intact Perseveration: No perseveration noted Safety/Judgement: Decreased insight into deficits; Decreased awareness of need for safety;Decreased awareness of need for assistance Functional Mobility and Transfers for ADLs: 
Bed Mobility: 
Supine to Sit: Contact guard assistance;Assist x1;Additional time Transfers: 
Sit to Stand: Maximum assistance;Assist x1;Additional time Functional Transfers Toilet Transfer : Maximum assistance;Assist x1(using BSC) Bed to Chair: Moderate assistance;Assist x1;Additional time Balance: 
Sitting: Impaired; Without support Sitting - Static: Fair (occasional) Sitting - Dynamic: Fair (occasional) Standing: Impaired; With support Standing - Static: Fair;Constant support Standing - Dynamic : Poor;Constant support ADL Intervention: 
  
 
  
 
  
 
  
 
Upper Body Dressing Assistance Dressing Assistance: Total assistance(dependent) Toileting Toileting Assistance: Maximum assistance Bowel Hygiene: Maximum assistance Cognitive Retraining Safety/Judgement: Decreased insight into deficits; Decreased awareness of need for safety;Decreased awareness of need for assistance Activity Tolerance:  
Fair, requires rest breaks, and observed SOB with activity Please refer to the flowsheet for vital signs taken during this treatment. After treatment patient left in no apparent distress:  
Sitting in chair and Call bell within reach; chair alarm on COMMUNICATION/COLLABORATION:  
The patients plan of care was discussed with: Physical Therapist and Registered Nurse Kimi Davis Time Calculation: 36 mins

## 2019-12-20 NOTE — PROGRESS NOTES
ID Progress Note 
2019 Subjective:  
 
Bleeding issues seem improved Objective: Antibiotics: 1. Levaquin 2. Rifampin 3. Fluconazole 4. Vancomycin 5. Cefazolin 6. Zosyn Vitals:  
Visit Vitals BP 91/72 (BP 1 Location: Left arm, BP Patient Position: At rest) Pulse 67 Temp 98 °F (36.7 °C) Resp 23 Ht 6' 2\" (1.88 m) Wt 77.8 kg (171 lb 8.3 oz) SpO2 98% BMI 22.02 kg/m² Tmax:  Temp (24hrs), Av.2 °F (36.8 °C), Min:97.5 °F (36.4 °C), Max:98.5 °F (36.9 °C) Exam:  Lungs:  clear to auscultation bilaterally Heart:  regular rate and rhythm Abdomen:  soft, non-tender. Bowel sounds normal. No masses,  no organomegaly Labs:     
Recent Labs  
  19 
1642 19 
2753 19 
0014 19 
1607 19 
1009 19 
0236  19 
0406 WBC  --   --  5.1  --   --  4.9  --  4.9 HGB 7.6* 7.2* 6.8* 7.1* 7.0* 7.0*   < > 6.8* PLT  --   --  165  --   --  151  --  158 BUN  --   --  38*  --   --  40*  --  41* CREA  --   --  1.92*  --   --  2.01*  --  2.09* SGOT  --   --  21  --   --  16  --  14* AP  --   --  98  --   --  101  --  82  
TBILI  --   --  0.5  --   --  0.7  --  0.6  
 < > = values in this interval not displayed. Cultures: No results found for: DANIELLE Lab Results Component Value Date/Time Culture result: NO GROWTH 5 DAYS 12/15/2019 03:37 PM  
 Culture result: NO GROWTH 1 DAY 12/15/2019 03:30 PM  
 Culture result: NO GROWTH 5 DAYS 2019 11:23 PM  
 
 
Radiology:  
 
Line/Insert Date:        
 
Assessment:  
 
1. UTI--Serratia (2 biotypes) from culture and small amount of Enterococcus 2. CHF/cardiomyopathy 3. ?aspiration event(s) 4. Renal insufficiency 5. Respiratory difficulties Objective: 1. Monitor off antibiotics 2. Group will see over the weekend if asked Deliah Lennox, MD

## 2019-12-20 NOTE — PROGRESS NOTES
0730: PRECEPTOR REVIEW OF RN ORIENTEE'S WORK: 
 
12/20/2019    Shift times- 0730 to 1600 
 
K AMARILIS RN'S documentation of patient care for Anabel Herrera has been reviewed and approved. All medications have been administered under the direct supervision of the preceptor. Arabella Ferrara, RN 
 
[id number]: OOB to chair. 1545- 1900: OOB to chair. 1930:Bedside and Verbal shift change report given to himanshu luna (oncoming nurse) by kelly ferrara rn (offgoing nurse). Report included the following information SBAR, Kardex, OR Summary, Procedure Summary, Intake/Output, MAR and Recent Results. Problem: Falls - Risk of 
Goal: *Absence of Falls Description Document Francialuis angel Soodata Fall Risk and appropriate interventions in the flowsheet. Outcome: Progressing Towards Goal 
Note: Fall Risk Interventions: 
Mobility Interventions: Communicate number of staff needed for ambulation/transfer, OT consult for ADLs, PT Consult for mobility concerns, Patient to call before getting OOB Mentation Interventions: Adequate sleep, hydration, pain control, Door open when patient unattended, Gait belt with transfers/ambulation, Increase mobility, More frequent rounding, Reorient patient, Room close to nurse's station Medication Interventions: Evaluate medications/consider consulting pharmacy, Patient to call before getting OOB, Teach patient to arise slowly, Utilize gait belt for transfers/ambulation Elimination Interventions: Call light in reach, Patient to call for help with toileting needs, Toileting schedule/hourly rounds History of Falls Interventions: Door open when patient unattended Problem: Patient Education: Go to Patient Education Activity Goal: Patient/Family Education Outcome: Progressing Towards Goal 
  
Problem: Pressure Injury - Risk of 
Goal: *Prevention of pressure injury Description Document Mich Scale and appropriate interventions in the flowsheet.  
Outcome: Progressing Towards Goal 
 Note: Pressure Injury Interventions: 
Sensory Interventions: Chair cushion, Check visual cues for pain, Float heels, Keep linens dry and wrinkle-free, Maintain/enhance activity level, Minimize linen layers, Pressure redistribution bed/mattress (bed type) Moisture Interventions: Absorbent underpads, Internal/External urinary devices Activity Interventions: Increase time out of bed, Pressure redistribution bed/mattress(bed type), PT/OT evaluation Mobility Interventions: Chair cushion, Pressure redistribution bed/mattress (bed type), PT/OT evaluation Nutrition Interventions: Document food/fluid/supplement intake, Offer support with meals,snacks and hydration Friction and Shear Interventions: Apply protective barrier, creams and emollients, HOB 30 degrees or less, Lift sheet, Minimize layers Problem: Patient Education: Go to Patient Education Activity Goal: Patient/Family Education Outcome: Progressing Towards Goal 
  
Problem: LVAD Post-op Day 15 to Discharge Goal: Activity/Safety Outcome: Progressing Towards Goal 
Note: MAX ASSIST X 2, STAND PIVOT Goal: Consults, if ordered Outcome: Progressing Towards Goal 
Note:  
UROLOGY AND NEPHROLOGY FOLLOWING Goal: Diagnostic Test/Procedures Outcome: Progressing Towards Goal 
Goal: Nutrition/Diet Outcome: Progressing Towards Goal 
Note: POOR APPETITE, SUPPLEMENT ENSURES Goal: Medications Outcome: Progressing Towards Goal 
Note: WEAN PRIMACOR PER ORDER Goal: Discharge Planning Outcome: Progressing Towards Goal 
Goal: Respiratory Outcome: Progressing Towards Goal 
Goal: Treatments/Interventions/Procedures Outcome: Progressing Towards Goal 
Note:  
ONGOING LVAD EDUCATION Goal: Psychosocial 
Outcome: Progressing Towards Goal

## 2019-12-21 NOTE — PROGRESS NOTES
Urology Progress Note Patient: Meg Vera MRN: [id number]  SSN: [id number] YOB: 1950  Age: 71 y.o. Sex: male ADMITTED: 10/25/2019 to Alok Shah MD for [facility] [I50.9] POD# 26 Days Post-Op Procedure(s): LEFT BRACHIAL THROMBECTOMY Follow-up regarding hematuria. He has no complaints. Catheter has been draining well. Mild hematuria with CBI going slowly. Urine clears easily with increasing CBI. Vitals: Temp (24hrs), Av.6 °F (36.4 °C), Min:97.4 °F (36.3 °C), Max:98 °F (36.7 °C) Blood pressure 91/72, pulse 76, temperature 97.6 °F (36.4 °C), resp. rate 18, height 6' 2\" (1.88 m), weight 77.3 kg (170 lb 6.7 oz), SpO2 99 %. Intake and Output: 
 1901 -  0700 In: 09263.2 [P.O.:1440; I.V.:212.7] Out: 70685 Abdomen soft. Bladder not distended. Labs: 
Labs:  
Lab Results Component Value Date/Time WBC 4.6 2019 12:16 AM  
 HGB 7.6 (L) 2019 08:37 AM  
 Creatinine 1.79 (H) 2019 12:16 AM  
 
 
 
Assessment/Plan: 
Hematuria seems to be improving. Continue to taper CBI. Hand irrigate as needed. Signed By: Reji Godfrey MD - 2019

## 2019-12-21 NOTE — PROGRESS NOTES
1940: Report received from [address] [place] [address] Mary: Pt had multiple attempts on bedpan t/o night shift without having a bowel movement. Requested toreceive to help hi, Administered PRN Dulcolax PO 
 
0815: Bedside and Verbal shift change report given to Pratik Huynh RN (oncoming nurse) by Maeola Kawasaki RN (offgoing nurse). Report included the following information SBAR, Kardex, Procedure Summary, Intake/Output, MAR, Recent Results, Med Rec Status and Cardiac Rhythm paced.

## 2019-12-21 NOTE — PROGRESS NOTES
[address] [facility] [address] [facility] in [place], [place] Inpatient Progress Note Patient name: Arianna Wynne Patient : 1950 Patient MRN: [id number] Attending MD: Ann Marie Santos MD 
Date of service: 19 Chief Complaint:  
Juliocesar Flores azucena LVAD implant 
  
HPI: Sona Kiser is a 71y.o. year old pleasant white male with a history of HTN, HLD, JOSE, CAD s/p cardiac arrest VFib s/p CABG () c/b sternal would infection and sternectomy, ischemic cardiomyopathy LVEF 15-20%, s/p ICD and with LBBB. He was admitted with acute on chronic systolic heart failure with massive volume overload > 20 lbs, in the setting of atrial fibrillation s/p failed DCCV and underwent DCCV and RHC on .  S/p BiVICD on 19 with Dr. Simran Obando. He was discharged home home on IV milrinone on 19. Kennedy Gilbert has been followed closely by Dr. Mima Garcia and the [facility]. 
  
Mr. Kiser was admitted for acute on chronic systolic heart failure. He underwent implantation of Impella 5.0 due to CS and has completed his LVAD evaluation. Kennedy Gilbert meets criteria for implant of HM3 as DT. He was NYHA Class IV prior to implant of Impella 5.0, has LVEF < 15%, was intolerant of GDMT due to symptomatic hypotension and renal dysfunction. He remains dependent on temporary MCS support. RHC  revealed compensated hemodynamics on Impella. His renal function has improved dramatically with improvement in his hemodynamics. He is not a suitable transplant candidate due to single kidney, sternectomy, debility, and frailty. He was reviewed by Ginna Kramer and felt to be a high risk heart transplant candidate due to multiple co-morbidities as well as the afore mentioned conditions.  He remained in the CCU and underwent a HM 3 implant as DT on . He was weaned off of pressors and transferred to [place] [address] where he continues to undergo PT/OT and hemodynamic optimization.   
  
24Hr Events Continues to have hematuria CBI ongoing Hgb 7.6 Cr improving Procedure:  Procedure(s): REMOVAL TEMPORARY LEFT VENTRICULAR ASSIST DEVICE, IMPLANTATION OF LEFT VENTRICULAR ASSIST DEVICE PERMANENT (VAD), ECC, JACQUE, EPI AORTIC US BY DR Aylin Motley.    
POD:  32 
  
Impression / Plan:  
Heart Failure Status: NYHA Class IV Chronic systolic heart failure due to ICM, NYHA Class IV, EF < 15%, cardiogenic shock bridged with Impella 5.0 support to HM 3 implant S/p Impella removal and LVAD implant 11/18/19 RPMs 6400 rpm  
TTE with bubble study negative for PFO Continue milrinone 0.2mcg/kg/min - consider wean tomorrow Unable to obtain CardioMEMS readings over the weekend - try on Monday Continue Bumex 2 mg IV BID Cont Sildenafil 40 mg PO TID - rx sent to local pharmacy to initiate PA Intolerant of BB due to RV failure Intolerant of ACEi/ARB/ARNI/AA due to JUAN J on CKD 3 Strict I/O, daily weights, Na+ restricted diet Continue LVAD education Remove suture from drive line exit site on 12/23 and decrease dressing change frequency to three times weekly TTE 12/16- EF 15-20% QTc 374 12/18/19 Generalized myoclonus Improved Appreciate Neurology input Continue Keppra with renal dosing Head CT negative for acute process EEG suggestive of mild generalized encephalopathic process, possibly related to underlying structural brain injury vs metabolic abnormality Monitor closely  
  
Anticoagulation for LVAD, INR goal 2-3 DC ASA d/t hematuria INR 1.2 Cont coumadin Epistaxis Resolved Afrin soaked guaze ENT consult appreciated Monitor for now  
  
Acute Respiratory Failure post op Improved with aggressive diuresis, but still c/o dyspnea CXR and O2 requirement improved ABG acceptable TTE with Bubble study negative for PFO Pulmonary Consult appreciated Pulmonary hygiene Cont home CPAP- must use while sleeping  
 milrinone dose to 0.2 mcgs/kg/min Acute on CKD 3, atrophic left kidney Appreciate Nephrology assistance Watch Cr - improving Daily diuretic dosing Avoid nephrotoxins, trend labs Renally dose meds  
  
CAD s/p CABG Cont low dose ASA- watch platelets No BB d/t RV failure Hold statin due to recent hepatic failure LHC performed 11/13 - low likelihood of benefit from revascularization  
  
Hx of VF arrest s/p BiV-ICD No recent shocks Keep K > 4 and Mg > 2  
   
PAF Dig level 1.0 -cont dig (62.5 mcg qMWF) No BB due to RV failure Repeat TFTs WNL 
  
Hx of gout Uric acid WNL Acute blood loss anemia Likely due to hematuria Cont octreotide 25 mcg SQ TID Transfuse to keep Hgb > 7.0 Fecal occult 12/14 negative, positive on 12/17 Appreciate urology recommendations Cont CBI GI following, low suspicion for GIB 
  
Suspected aspiration pneumonia Sputum culture showing scant growth of yeast 
Cefepime complete Urinary retention, c/b serratia UTI and hematuria Appreciate Urology consult - asked to see again due to new hematuria Repeat UA with cx Watch off abx Cystoscopy performed 11/13 shows catheter induced cystitis Sepsis Alert Paired Blood Neg 
Urine cx negative Sputum cx when able Malnutrition Appreciate Nutritionist consult Prealbumin weekly - 13 on 12/16 Diet as tolerated Intolerant of mirtazapine d/t tremors, confusion Cont  Marinol Cont MVI 
  
COPD Appreciate Pulmonology assistance Continue nebs PRN   
  
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  
  
Hx of sternal wound infection, sternectomy Sternum closed post op- monitor  
  
Vocal cord paralysis Improved ENT recs appreciated Neck CT- R neck edema, no airway compromise Speech therapy following - appreciate input Anxiety Klonipin 0.5 mg TID prn anxiety Depression Cont lexapro 10 mg qpm 
Monitor QTc Monitor sodium  
  
Debility Continue PT/OT  
  
Ppx Protonix PT/OT Bowel regimen PICC placed 11/22/19   
Dispo: Will need IP rehab. Not ready for discharge at this time LVAD INTERROGATION: 
Device interrogated in person Device function normal, normal flow, multiple PI events LVAD Pump Speed (RPM): 6400 Pump Flow (LPM): 6.2 MAP: 74 
PI (Pulsitility Index): 3.1 Power: 5.5  Test: No 
Back Up  at Bedside & Labeled: Yes Power Module Test: Yes Driveline Site Care: No 
Driveline Dressing: Clean, Dry, and Intact Outpatient: No 
MAP in Therapeutic Range (Outpatient): Yes Testing Alarms Reviewed: Yes 
Back up SC speed: 6400 Back up Low Speed Limit: 6000 Emergency Equipment with Patient?: Yes Emergency procedures reviewed?: Yes Drive line site inspected?: No 
Drive line intergrity inspected?: Yes Drive line dressing changed?: No 
 
PHYSICAL EXAM: 
Visit Vitals BP 91/72 (BP 1 Location: Left arm, BP Patient Position: At rest) Pulse 76 Temp 97.6 °F (36.4 °C) Resp 18 Ht 6' 2\" (1.88 m) Wt 170 lb 6.7 oz (77.3 kg) SpO2 99% BMI 21.88 kg/m² Physical Exam  
Constitutional: He is oriented to person, place, and time. He appears well-developed. He appears cachectic. No distress. HENT:  
Head: Normocephalic. Neck: Normal range of motion. Neck supple. No JVD present. Cardiovascular: Normal rate, regular rhythm and normal heart sounds. + VAD hum Pulmonary/Chest: Effort normal and breath sounds normal. No respiratory distress. Abdominal: Soft. Bowel sounds are normal. He exhibits no distension. Dobhoff in place Genitourinary:    Genitourinary Comments: Hematuria Musculoskeletal: Normal range of motion. General: No edema. Neurological: He is alert and oriented to person, place, and time. Skin: Skin is warm and dry. Nursing note and vitals reviewed. REVIEW OF SYSTEMS: 
Review of Systems Constitutional: Positive for malaise/fatigue. Negative for chills and fever. HENT: Positive for hearing loss. Negative for nosebleeds. Respiratory: Negative for cough and shortness of breath. Mild dyspnea Cardiovascular: Negative for chest pain, palpitations, orthopnea and leg swelling. Gastrointestinal: Negative for heartburn, nausea and vomiting. Genitourinary: Positive for hematuria. Musculoskeletal: Negative. Neurological: Positive for weakness. Negative for dizziness and headaches. Endo/Heme/Allergies: Bruises/bleeds easily. PAST MEDICAL HISTORY: 
Past Medical History:  
Diagnosis Date  Degenerative disc disease, lumbar  Heart failure ([facility] [address]  High cholesterol  Hypertension  Paroxysmal atrial fibrillation ([facility] [address] 4/2/2019  Spinal stenosis PAST SURGICAL HISTORY: 
Past Surgical History:  
Procedure Laterality Date  COLONOSCOPY Left 11/11/2019 COLONOSCOPY at bedside performed by Mike Cook MD at [facility]  HX CORONARY ARTERY BYPASS GRAFT    
 triple  HX HERNIA REPAIR    
 HX IMPLANTABLE CARDIOVERTER DEFIBRILLATOR  MO CARDIOVERSION ELECTIVE ARRHYTHMIA EXTERNAL N/A 6/10/2019 EP CARDIOVERSION performed by Lavinia Keene MD at [place] [address], [address] CATH LAB  MO CARDIOVERSION ELECTIVE ARRHYTHMIA EXTERNAL N/A 6/18/2019 EP CARDIOVERSION performed by Fabian Ng MD at [place] [address], [address] CATH LAB  MO INSJ/RPLCMT PERM DFB W/TRNSVNS LDS 1/DUAL CHMBR N/A 6/21/2019 INSERT ICD BIV MULTI performed by Renee Garcia MD at [place] [address], [address] CATH LAB  MO TCAT IMPL WRLS P-ART PRS SNR L-T HEMODYN MNTR N/A 9/18/2019 IMPLANT HEART FAILURE MONITORING DEVICE performed by Dina Arizmendi MD at [place] [address], [address] CATH LAB FAMILY HISTORY: 
Family History Problem Relation Age of Onset  Lung Disease Mother  Hypertension Mother [facility] Arthritis-osteo Mother  Heart Disease Mother  Heart Disease Father  Diabetes Father SOCIAL HISTORY: 
Social History Socioeconomic History  Marital status:  Spouse name: Not on file  Number of children: Not on file  Years of education: Not on file  Highest education level: Not on file Tobacco Use  Smoking status: Former Smoker Last attempt to quit: 2010 Years since quittin.0  Smokeless tobacco: Never Used Substance and Sexual Activity  Alcohol use: Not Currently Comment: rarely  Drug use: Never Other Topics Concern LABORATORY RESULTS: 
  
Labs Latest Ref Rng & Units 2019 WBC 4.1 - 11.1 K/uL - 4.6 - - 5.1 - -  
RBC 4.10 - 5.70 M/uL - 2.48(L) - - 2.34(L) - - Hemoglobin 12.1 - 17.0 g/dL 7. 6(L) 7. 2(L) 7. 6(L) 7. 2(L) 6. 8(L) 7. 1(L) 7. 0(L) Hematocrit 36.6 - 50.3 % 23. 9(L) 22. 9(L) 24. 1(L) 22. 6(L) 21. 6(L) 22. 4(L) 22. 4(L) MCV 80.0 - 99.0 FL - 92.3 - - 92.3 - - Platelets 787 - 235 K/uL - 164 - - 165 - - Lymphocytes 12 - 49 % - - - - - - - Monocytes 5 - 13 % - - - - - - - Eosinophils 0 - 7 % - - - - - - - Basophils 0 - 1 % - - - - - - - Albumin 3.5 - 5.0 g/dL - 2. 4(L) - - 2. 2(L) - -  
Calcium 8.5 - 10.1 MG/DL - 8.9 - - 8.7 - -  
SGOT 15 - 37 U/L - 23 - - 21 - -  
Glucose 65 - 100 mg/dL - 113(H) - - 142(H) - -  
BUN 6 - 20 MG/DL - 35(H) - - 38(H) - - Creatinine 0.70 - 1.30 MG/DL - 1.79(H) - - 1.92(H) - - Sodium 136 - 145 mmol/L - 131(L) - - 131(L) - - Potassium 3.5 - 5.1 mmol/L - 3.7 - - 4.1 - -  
TSH 0.36 - 3.74 uIU/mL - - - - - - -  
PSA 0.01 - 4.0 ng/mL - - - - - - -  
LDH 85 - 241 U/L - 248(H) - - 192 - -  
CEA ng/mL - - - - - - - Some recent data might be hidden Lab Results Component Value Date/Time TSH 2.30 2019 03:29 AM  
 TSH 2.40 10/25/2019 07:39 PM  
 TSH 2.45 2019 04:16 AM  
 
 
ALLERGY: 
No Known Allergies CURRENT MEDICATIONS: 
Current Facility-Administered Medications Medication Dose Route Frequency  senna-docusate (PERICOLACE) 8.6-50 mg per tablet 1 Tab  1 Tab Oral BID  polyethylene glycol (MIRALAX) packet 17 g  17 g Oral DAILY  warfarin (COUMADIN) tablet 5 mg  5 mg Oral ONCE  
 0.9% sodium chloride infusion 250 mL  250 mL IntraVENous PRN  
 0.9% sodium chloride infusion 250 mL  250 mL IntraVENous PRN  
 bumetanide (BUMEX) injection 2 mg  2 mg IntraVENous Q12H  
 0.9% sodium chloride infusion 250 mL  250 mL IntraVENous PRN  therapeutic multivitamin (THERAGRAN) tablet 1 Tab  1 Tab Oral DAILY  dronabinol (MARINOL) capsule 2.5 mg  2.5 mg Oral BID  escitalopram oxalate (LEXAPRO) tablet 10 mg  10 mg Oral QPM  
 potassium chloride SR (KLOR-CON 10) tablet 40 mEq  40 mEq Oral DAILY  alteplase (CATHFLO) 1 mg in sterile water (preservative free) 1 mL injection  1 mg InterCATHeter PRN  
 0.9% sodium chloride infusion 250 mL  250 mL IntraVENous PRN  finasteride (PROSCAR) tablet 5 mg  5 mg Oral DAILY  dronabinol (MARINOL) capsule 2.5 mg  2.5 mg Oral DAILY  0.9% sodium chloride infusion 250 mL  250 mL IntraVENous PRN  
 0.9% sodium chloride infusion 250 mL  250 mL IntraVENous PRN  
 albuterol-ipratropium (DUO-NEB) 2.5 MG-0.5 MG/3 ML  3 mL Nebulization Q6H RT  
 oxymetazoline (AFRIN) 0.05 % nasal spray 2 Spray  2 Spray Both Nostrils BID  spironolactone (ALDACTONE) tablet 25 mg  25 mg Oral DAILY  clonazePAM (KlonoPIN) tablet 0.5 mg  0.5 mg Oral TID PRN  
 milrinone (PRIMACOR) 20 MG/100 ML D5W infusion  0.2 mcg/kg/min IntraVENous CONTINUOUS  
 sildenafil (pulm.hypertension) (REVATIO) tablet 40 mg  40 mg Oral TID  magnesium oxide (MAG-OX) tablet 400 mg  400 mg Oral BID  diphenhydrAMINE (BENADRYL) capsule 25 mg  25 mg Oral QHS PRN  
 octreotide (SANDOSTATIN) injection 25 mcg  25 mcg SubCUTAneous TID  benzocaine-menthol (CEPACOL) lozenge 1 Lozenge  1 Lozenge Mucous Membrane PRN  
 digoxin (LANOXIN) tablet 0.0625 mg  62.5 mcg Oral Q MON, WED & FRI  
  levETIRAcetam (KEPPRA) oral solution 250 mg  250 mg Oral Q12H  pantoprazole (PROTONIX) tablet 40 mg  40 mg Oral ACB  allopurinol (ZYLOPRIM) tablet 100 mg  100 mg Oral DAILY  arformoterol (BROVANA) neb solution 15 mcg  15 mcg Nebulization BID RT And  
 budesonide (PULMICORT) 500 mcg/2 ml nebulizer suspension  500 mcg Nebulization BID RT  
 artificial saliva (MOUTH KOTE) 1 Spray  1 Spray Oral PRN  
 lactobac ac& pc-s.therm-b.anim (TIAN Q/RISAQUAD)  1 Cap Oral DAILY  oxyCODONE IR (ROXICODONE) tablet 5 mg  5 mg Oral Q6H PRN  
 balsam peru-castor oil (VENELEX) ointment   Topical TID  tamsulosin (FLOMAX) capsule 0.4 mg  0.4 mg Oral DAILY  insulin lispro (HUMALOG) injection   SubCUTAneous AC&HS  Warfarin MD/NP dosing   Other PRN  
 epoetin yvonne-epbx (RETACRIT) injection 20,000 Units  20,000 Units SubCUTAneous Q7D  
 sodium chloride (NS) flush 5-40 mL  5-40 mL IntraVENous Q8H  
 sodium chloride (NS) flush 5-40 mL  5-40 mL IntraVENous PRN  
 acetaminophen (TYLENOL) tablet 650 mg  650 mg Oral Q6H PRN  
 naloxone (NARCAN) injection 0.4 mg  0.4 mg IntraVENous PRN  
 ondansetron (ZOFRAN) injection 4 mg  4 mg IntraVENous Q4H PRN  
 bisacodyl (DULCOLAX) tablet 5 mg  5 mg Oral DAILY PRN  
 sucralfate (CARAFATE) tablet 1 g  1 g Oral AC&HS  influenza vaccine 2019-20 (6 mos+)(PF) (FLUARIX/FLULAVAL/FLUZONE QUAD) injection 0.5 mL  0.5 mL IntraMUSCular PRIOR TO DISCHARGE  hydrALAZINE (APRESOLINE) 20 mg/mL injection 20 mg  20 mg IntraVENous Q6H PRN  
 dextrose 10 % infusion 125-250 mL  125-250 mL IntraVENous PRN Thank you for allowing me to participate in this patient's care. TIERRA Paiz [address] 
[address] [facility], [address] Phone: [phone] Fax: [phone]

## 2019-12-21 NOTE — PROGRESS NOTES
[facility] 
 [address] [facility], [address] [facility] Phone: [phone]   [phone]   
  
Nephrology Progress Note Sona Kiser     1950     [id number] Date of Admission : 10/25/2019 
12/21/19 CC:  Follow up for JUAN J, CKD, Hyponatremia Assessment and Plan JUAN J on CKD: 
- CXR better  
- continue  Bumex back to 2 mg IV BID  
- daily labs Hyponatremia : 
- 2/2 CHF vs fluid overload / water retention from CBI  
- IV diuresis and watch Gross hematuria, BPH w/ retention: 
- off CBI pre VAD. cysto pre op showed catheter related Cystitis @ postr wall  
- CBI for recurrent hematuria  
- wean CBI per urology and remove neal over weekend Hypokalemia  
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  
- unable to get CTA/MRA 
- On [facility] Acute on Chronic HFrEF  
- EF 16-20%, NYHA Class IV , hx of VF arrest - s/p AICD 
- Impella insertion 10/29- removed 11/18  
- s/p LVAD placement on 11/18 
- s/p right thoracentesis. CT in place Hoarseness, vocal cord paralysis, mediastinal adenopathy: 
- will need eventual PET/CT 
  
L arm clot s/p thrombectomy on 11/25 JOSE on CPAP  
  
PAF s/p DCCV 6/19 
  
Anemia: 
- from blood loss s/p multiple blood products - s/p IV iron,  Repeat iron studies ok 
- on PAVEL q7 d Serratia and Enteroccocus UTI: 
- completed abx Interval History:   
Seen and examined  
hbg better Cr improving  And wt coming down SOB - mild Review of Systems: as per HPI Current Medications:  
Current Facility-Administered Medications Medication Dose Route Frequency  senna-docusate (PERICOLACE) 8.6-50 mg per tablet 1 Tab  1 Tab Oral BID  polyethylene glycol (MIRALAX) packet 17 g  17 g Oral DAILY  0.9% sodium chloride infusion 250 mL  250 mL IntraVENous PRN  
 0.9% sodium chloride infusion 250 mL  250 mL IntraVENous PRN  
 bumetanide (BUMEX) injection 2 mg  2 mg IntraVENous Q12H  0.9% sodium chloride infusion 250 mL  250 mL IntraVENous PRN  therapeutic multivitamin (THERAGRAN) tablet 1 Tab  1 Tab Oral DAILY  dronabinol (MARINOL) capsule 2.5 mg  2.5 mg Oral BID  escitalopram oxalate (LEXAPRO) tablet 10 mg  10 mg Oral QPM  
 potassium chloride SR (KLOR-CON 10) tablet 40 mEq  40 mEq Oral DAILY  alteplase (CATHFLO) 1 mg in sterile water (preservative free) 1 mL injection  1 mg InterCATHeter PRN  
 0.9% sodium chloride infusion 250 mL  250 mL IntraVENous PRN  finasteride (PROSCAR) tablet 5 mg  5 mg Oral DAILY  dronabinol (MARINOL) capsule 2.5 mg  2.5 mg Oral DAILY  0.9% sodium chloride infusion 250 mL  250 mL IntraVENous PRN  
 0.9% sodium chloride infusion 250 mL  250 mL IntraVENous PRN  
 albuterol-ipratropium (DUO-NEB) 2.5 MG-0.5 MG/3 ML  3 mL Nebulization Q6H RT  
 oxymetazoline (AFRIN) 0.05 % nasal spray 2 Spray  2 Spray Both Nostrils BID  spironolactone (ALDACTONE) tablet 25 mg  25 mg Oral DAILY  clonazePAM (KlonoPIN) tablet 0.5 mg  0.5 mg Oral TID PRN  
 milrinone (PRIMACOR) 20 MG/100 ML D5W infusion  0.2 mcg/kg/min IntraVENous CONTINUOUS  
 sildenafil (pulm.hypertension) (REVATIO) tablet 40 mg  40 mg Oral TID  magnesium oxide (MAG-OX) tablet 400 mg  400 mg Oral BID  diphenhydrAMINE (BENADRYL) capsule 25 mg  25 mg Oral QHS PRN  
 octreotide (SANDOSTATIN) injection 25 mcg  25 mcg SubCUTAneous TID  benzocaine-menthol (CEPACOL) lozenge 1 Lozenge  1 Lozenge Mucous Membrane PRN  
 digoxin (LANOXIN) tablet 0.0625 mg  62.5 mcg Oral Q MON, WED & FRI  levETIRAcetam (KEPPRA) oral solution 250 mg  250 mg Oral Q12H  pantoprazole (PROTONIX) tablet 40 mg  40 mg Oral ACB  allopurinol (ZYLOPRIM) tablet 100 mg  100 mg Oral DAILY  arformoterol (BROVANA) neb solution 15 mcg  15 mcg Nebulization BID RT  And  
 budesonide (PULMICORT) 500 mcg/2 ml nebulizer suspension  500 mcg Nebulization BID RT  
  artificial saliva (MOUTH KOTE) 1 Spray  1 Spray Oral PRN  
 lactobac ac& pc-s.therm-b.anim (TIAN Q/RISAQUAD)  1 Cap Oral DAILY  oxyCODONE IR (ROXICODONE) tablet 5 mg  5 mg Oral Q6H PRN  
 balsam peru-castor oil (VENELEX) ointment   Topical TID  tamsulosin (FLOMAX) capsule 0.4 mg  0.4 mg Oral DAILY  insulin lispro (HUMALOG) injection   SubCUTAneous AC&HS  Warfarin MD/NP dosing   Other PRN  
 epoetin yvonne-epbx (RETACRIT) injection 20,000 Units  20,000 Units SubCUTAneous Q7D  
 sodium chloride (NS) flush 5-40 mL  5-40 mL IntraVENous Q8H  
 sodium chloride (NS) flush 5-40 mL  5-40 mL IntraVENous PRN  
 acetaminophen (TYLENOL) tablet 650 mg  650 mg Oral Q6H PRN  
 naloxone (NARCAN) injection 0.4 mg  0.4 mg IntraVENous PRN  
 ondansetron (ZOFRAN) injection 4 mg  4 mg IntraVENous Q4H PRN  
 bisacodyl (DULCOLAX) tablet 5 mg  5 mg Oral DAILY PRN  
 sucralfate (CARAFATE) tablet 1 g  1 g Oral AC&HS  influenza vaccine 2019-20 (6 mos+)(PF) (FLUARIX/FLULAVAL/FLUZONE QUAD) injection 0.5 mL  0.5 mL IntraMUSCular PRIOR TO DISCHARGE  hydrALAZINE (APRESOLINE) 20 mg/mL injection 20 mg  20 mg IntraVENous Q6H PRN  
 dextrose 10 % infusion 125-250 mL  125-250 mL IntraVENous PRN No Known Allergies Objective: 
Vitals:   
Vitals:  
 12/21/19 0800 12/21/19 0819 12/21/19 1231 12/21/19 1305 BP:      
Pulse: 76  76 Resp: 19  18 Temp: 97.4 °F (36.3 °C)  97.6 °F (36.4 °C) SpO2: 93% 94% 99% 99% Weight:      
Height:      
 
Intake and Output: 
12/21 0701 - 12/21 1900 In: 6460 [P.O.:240] Out: 5250  
12/19 1901 - 12/21 0700 In: 79211.9 [P.O.:1440; I.V.:212.7] Out: 02922 Physical Examination: 
 
General: Elderly man in no acute distress HEENT:           [place]:  No lines Resp:  Rales + CV:  VAD sounds; No LE edema GI:  Soft and non-tender; no distension Neurologic:  Alert and appropriate; normal speech :  + neal; gross hematuria [x]    High complexity decision making was performed 
[x]    Patient is at high-risk of decompensation with multiple organ involvement Lab Data Personally Reviewed: I have reviewed all the pertinent labs, microbiology data and radiology studies during assessment. Recent Labs  
  12/21/19 
0016 12/20/19 
0014 12/19/19 
0236 * 131* 134* K 3.7 4.1 3.7 CL 92* 93* 94* CO2 38* 36* 36* * 142* 142* BUN 35* 38* 40* CREA 1.79* 1.92* 2.01* CA 8.9 8.7 8.7 MG 1.8 1.8 1.9 ALB 2.4* 2.2* 2.2*  
SGOT 23 21 16 ALT 13 12 11* INR 1.2* 1.3* 2.1* Recent Labs  
  12/21/19 
0274 12/21/19 
0016 12/20/19 
1642 12/20/19 
2857 12/20/19 
0014  12/19/19 
0236 WBC  --  4.6  --   --  5.1  --  4.9 HGB 7.6* 7.2* 7.6* 7.2* 6.8*   < > 7.0*  
HCT 23.9* 22.9* 24.1* 22.6* 21.6*   < > 22.2*  
PLT  --  164  --   --  165  --  151  
 < > = values in this interval not displayed. No results found for: SDES Lab Results Component Value Date/Time Culture result: NO GROWTH 5 DAYS 12/15/2019 03:37 PM  
 Culture result: NO GROWTH 1 DAY 12/15/2019 03:30 PM  
 Culture result: NO GROWTH 5 DAYS 12/06/2019 11:23 PM  
 Culture result: HEAVY ENTEROCOCCUS SPECIES NOTED (A) 11/27/2019 11:10 AM  
 Culture result: LIGHT YEAST (A) 11/27/2019 11:10 AM  
 
Recent Results (from the past 24 hour(s)) GLUCOSE, POC Collection Time: 12/20/19  4:38 PM  
Result Value Ref Range Glucose (POC) 131 (H) 65 - 100 mg/dL Performed by [facility] HGB & HCT Collection Time: 12/20/19  4:42 PM  
Result Value Ref Range HGB 7.6 (L) 12.1 - 17.0 g/dL HCT 24.1 (L) 36.6 - 50.3 % GLUCOSE, POC Collection Time: 12/20/19 10:06 PM  
Result Value Ref Range Glucose (POC) 149 (H) 65 - 100 mg/dL Performed by [facility] METABOLIC PANEL, COMPREHENSIVE Collection Time: 12/21/19 12:16 AM  
Result Value Ref Range Sodium 131 (L) 136 - 145 mmol/L  Potassium 3.7 3.5 - 5.1 mmol/L  
 Chloride 92 (L) 97 - 108 mmol/L  
 CO2 38 (H) 21 - 32 mmol/L Anion gap 1 (L) 5 - 15 mmol/L Glucose 113 (H) 65 - 100 mg/dL BUN 35 (H) 6 - 20 MG/DL Creatinine 1.79 (H) 0.70 - 1.30 MG/DL  
 BUN/Creatinine ratio 20 12 - 20 GFR est AA 46 (L) >60 ml/min/1.73m2 GFR est non-AA 38 (L) >60 ml/min/1.73m2 Calcium 8.9 8.5 - 10.1 MG/DL Bilirubin, total 0.6 0.2 - 1.0 MG/DL  
 ALT (SGPT) 13 12 - 78 U/L  
 AST (SGOT) 23 15 - 37 U/L Alk. phosphatase 97 45 - 117 U/L Protein, total 6.6 6.4 - 8.2 g/dL Albumin 2.4 (L) 3.5 - 5.0 g/dL Globulin 4.2 (H) 2.0 - 4.0 g/dL A-G Ratio 0.6 (L) 1.1 - 2.2 MAGNESIUM Collection Time: 12/21/19 12:16 AM  
Result Value Ref Range Magnesium 1.8 1.6 - 2.4 mg/dL CBC W/O DIFF Collection Time: 12/21/19 12:16 AM  
Result Value Ref Range WBC 4.6 4.1 - 11.1 K/uL  
 RBC 2.48 (L) 4.10 - 5.70 M/uL HGB 7.2 (L) 12.1 - 17.0 g/dL HCT 22.9 (L) 36.6 - 50.3 % MCV 92.3 80.0 - 99.0 FL  
 MCH 29.0 26.0 - 34.0 PG  
 MCHC 31.4 30.0 - 36.5 g/dL  
 RDW 16.7 (H) 11.5 - 14.5 % PLATELET 683 605 - 631 K/uL MPV 9.7 8.9 - 12.9 FL  
 NRBC 0.0 0  WBC ABSOLUTE NRBC 0.00 0.00 - 0.01 K/uL PROTHROMBIN TIME + INR Collection Time: 12/21/19 12:16 AM  
Result Value Ref Range INR 1.2 (H) 0.9 - 1.1 Prothrombin time 11.9 (H) 9.0 - 11.1 sec PTT Collection Time: 12/21/19 12:16 AM  
Result Value Ref Range aPTT 29.3 22.1 - 32.0 sec  
 aPTT, therapeutic range     58.0 - 77.0 SECS  
NT-PRO BNP Collection Time: 12/21/19 12:16 AM  
Result Value Ref Range NT pro-BNP 2,388 (H) <125 PG/ML  
DIGOXIN Collection Time: 12/21/19 12:16 AM  
Result Value Ref Range Digoxin level 1.0 0.90 - 2.00 NG/ML  
LD Collection Time: 12/21/19 12:16 AM  
Result Value Ref Range  (H) 85 - 241 U/L  
LACTIC ACID Collection Time: 12/21/19 12:16 AM  
Result Value Ref Range  Lactic acid 1.5 0.4 - 2.0 MMOL/L  
PROCALCITONIN  
 Collection Time: 12/21/19 12:16 AM  
Result Value Ref Range Procalcitonin 0.13 ng/mL GLUCOSE, POC Collection Time: 12/21/19  7:21 AM  
Result Value Ref Range Glucose (POC) 129 (H) 65 - 100 mg/dL Performed by Majo Camargo HGB & HCT Collection Time: 12/21/19  8:37 AM  
Result Value Ref Range HGB 7.6 (L) 12.1 - 17.0 g/dL HCT 23.9 (L) 36.6 - 50.3 % GLUCOSE, POC Collection Time: 12/21/19 12:30 PM  
Result Value Ref Range Glucose (POC) 152 (H) 65 - 100 mg/dL Performed by Jose Andrade MD

## 2019-12-21 NOTE — PROGRESS NOTES
Problem: Falls - Risk of 
Goal: *Absence of Falls Description Document Jessica Condon Fall Risk and appropriate interventions in the flowsheet. Outcome: Progressing Towards Goal 
Note: Fall Risk Interventions: 
Mobility Interventions: Communicate number of staff needed for ambulation/transfer, OT consult for ADLs, Patient to call before getting OOB, PT Consult for mobility concerns Mentation Interventions: Door open when patient unattended, Evaluate medications/consider consulting pharmacy, Gait belt with transfers/ambulation, Increase mobility, More frequent rounding, Reorient patient Medication Interventions: Patient to call before getting OOB, Evaluate medications/consider consulting pharmacy, Teach patient to arise slowly, Utilize gait belt for transfers/ambulation Elimination Interventions: Call light in reach, Patient to call for help with toileting needs History of Falls Interventions: Evaluate medications/consider consulting pharmacy Problem: Diabetes Self-Management Goal: *Disease process and treatment process Description Define diabetes and identify own type of diabetes; list 3 options for treating diabetes. Outcome: Progressing Towards Goal 
Goal: *Incorporating physical activity into lifestyle Description State effect of exercise on blood glucose levels. Outcome: Progressing Towards Goal 
Goal: *Developing strategies to promote health/change behavior Description Define the ABC's of diabetes; identify appropriate screenings, schedule and personal plan for screenings. Outcome: Progressing Towards Goal 
  
Problem: Heart Failure: Discharge Outcomes Goal: *Demonstrates ability to perform prescribed activity without shortness of breath or discomfort Outcome: Progressing Towards Goal 
Goal: *Verbalizes understanding and describes prescribed diet Outcome: Not Progressing Towards Goal 
Goal: *Verbalizes understanding/describes prescribed medications Outcome: Not Progressing Towards Goal 
Goal: *Describes available resources and support systems Description 
(eg: Home Health, Palliative Care, Advanced Medical Directive) Outcome: Not Progressing Towards Goal 
Goal: *Understands and describes signs and symptoms to report to providers(Stroke Metric) Outcome: Not Progressing Towards Goal 
Goal: *Describes/verbalizes understanding of follow-up/return appt Description 
(eg: to physicians, diabetes treatment coordinator, and other resources Outcome: Not Progressing Towards Goal 
Goal: *Describes importance of continuing daily weights and changes to report to physician Outcome: Not Progressing Towards Goal 
  
Problem: Discharge Planning Goal: *Discharge to safe environment Outcome: Not Progressing Towards Goal 
  
Problem: Infection - Risk of, Urinary Catheter-Associated Urinary Tract Infection Goal: *Absence of infection signs and symptoms Outcome: Progressing Towards Goal

## 2019-12-22 NOTE — PROGRESS NOTES
[facility] 
 [address] [facility], [facility] [address] [facility] Phone: [phone]   [phone]   
  
Nephrology Progress Note Sona Kiser     1950     [id number] Date of Admission : 10/25/2019 
12/22/19 CC:  Follow up for JUAN J, CKD, Hyponatremia Assessment and Plan JUAN J on CKD: 
- CXR better  
- continue  Bumex back to 2 mg IV BID  
- daily labs Hyponatremia : 
- 2/2 CHF vs fluid overload / water retention from CBI  
- IV diuresis and watch Gross hematuria, BPH w/ retention: 
- off CBI pre VAD. cysto pre op showed catheter related Cystitis @ postr wall  
- CBI for recurrent hematuria  
- wean CBI per urology and remove neal  
- still bloody urine Hypokalemia  
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  
- unable to get CTA/MRA 
- On [facility] Acute on Chronic HFrEF  
- EF 16-20%, NYHA Class IV , hx of VF arrest - s/p AICD 
- Impella insertion 10/29- removed 11/18  
- s/p LVAD placement on 11/18 
- s/p right thoracentesis. CT in place Hoarseness, vocal cord paralysis, mediastinal adenopathy: 
- will need eventual PET/CT 
  
L arm clot s/p thrombectomy on 11/25 JOSE on CPAP  
  
PAF s/p DCCV 6/19 
  
Anemia: 
- from blood loss s/p multiple blood products - s/p IV iron,  Repeat iron studies ok 
- on PAVEL q7 d Serratia and Enteroccocus UTI: 
- completed abx Interval History:   
Seen and examined  
hbg better Cr improving  And wt coming down , cr 1.6 SOB - mild Review of Systems: as per HPI Current Medications:  
Current Facility-Administered Medications Medication Dose Route Frequency  senna-docusate (PERICOLACE) 8.6-50 mg per tablet 1 Tab  1 Tab Oral BID  polyethylene glycol (MIRALAX) packet 17 g  17 g Oral DAILY  albuterol-ipratropium (DUO-NEB) 2.5 MG-0.5 MG/3 ML  3 mL Nebulization Q6H PRN  
 0.9% sodium chloride infusion 250 mL  250 mL IntraVENous PRN  
  0.9% sodium chloride infusion 250 mL  250 mL IntraVENous PRN  
 bumetanide (BUMEX) injection 2 mg  2 mg IntraVENous Q12H  
 0.9% sodium chloride infusion 250 mL  250 mL IntraVENous PRN  therapeutic multivitamin (THERAGRAN) tablet 1 Tab  1 Tab Oral DAILY  dronabinol (MARINOL) capsule 2.5 mg  2.5 mg Oral BID  escitalopram oxalate (LEXAPRO) tablet 10 mg  10 mg Oral QPM  
 potassium chloride SR (KLOR-CON 10) tablet 40 mEq  40 mEq Oral DAILY  alteplase (CATHFLO) 1 mg in sterile water (preservative free) 1 mL injection  1 mg InterCATHeter PRN  
 0.9% sodium chloride infusion 250 mL  250 mL IntraVENous PRN  finasteride (PROSCAR) tablet 5 mg  5 mg Oral DAILY  dronabinol (MARINOL) capsule 2.5 mg  2.5 mg Oral DAILY  0.9% sodium chloride infusion 250 mL  250 mL IntraVENous PRN  
 0.9% sodium chloride infusion 250 mL  250 mL IntraVENous PRN  
 oxymetazoline (AFRIN) 0.05 % nasal spray 2 Spray  2 Spray Both Nostrils BID  spironolactone (ALDACTONE) tablet 25 mg  25 mg Oral DAILY  clonazePAM (KlonoPIN) tablet 0.5 mg  0.5 mg Oral TID PRN  
 milrinone (PRIMACOR) 20 MG/100 ML D5W infusion  0.2 mcg/kg/min IntraVENous CONTINUOUS  
 sildenafil (pulm.hypertension) (REVATIO) tablet 40 mg  40 mg Oral TID  magnesium oxide (MAG-OX) tablet 400 mg  400 mg Oral BID  diphenhydrAMINE (BENADRYL) capsule 25 mg  25 mg Oral QHS PRN  
 octreotide (SANDOSTATIN) injection 25 mcg  25 mcg SubCUTAneous TID  benzocaine-menthol (CEPACOL) lozenge 1 Lozenge  1 Lozenge Mucous Membrane PRN  
 digoxin (LANOXIN) tablet 0.0625 mg  62.5 mcg Oral Q MON, WED & FRI  levETIRAcetam (KEPPRA) oral solution 250 mg  250 mg Oral Q12H  pantoprazole (PROTONIX) tablet 40 mg  40 mg Oral ACB  allopurinol (ZYLOPRIM) tablet 100 mg  100 mg Oral DAILY  arformoterol (BROVANA) neb solution 15 mcg  15 mcg Nebulization BID RT  And  
 budesonide (PULMICORT) 500 mcg/2 ml nebulizer suspension  500 mcg Nebulization BID RT  
  artificial saliva (MOUTH KOTE) 1 Spray  1 Spray Oral PRN  
 lactobac ac& pc-s.therm-b.anim (TIAN Q/RISAQUAD)  1 Cap Oral DAILY  oxyCODONE IR (ROXICODONE) tablet 5 mg  5 mg Oral Q6H PRN  
 balsam peru-castor oil (VENELEX) ointment   Topical TID  tamsulosin (FLOMAX) capsule 0.4 mg  0.4 mg Oral DAILY  insulin lispro (HUMALOG) injection   SubCUTAneous AC&HS  Warfarin MD/NP dosing   Other PRN  
 epoetin yvonne-epbx (RETACRIT) injection 20,000 Units  20,000 Units SubCUTAneous Q7D  
 sodium chloride (NS) flush 5-40 mL  5-40 mL IntraVENous Q8H  
 sodium chloride (NS) flush 5-40 mL  5-40 mL IntraVENous PRN  
 acetaminophen (TYLENOL) tablet 650 mg  650 mg Oral Q6H PRN  
 naloxone (NARCAN) injection 0.4 mg  0.4 mg IntraVENous PRN  
 ondansetron (ZOFRAN) injection 4 mg  4 mg IntraVENous Q4H PRN  
 bisacodyl (DULCOLAX) tablet 5 mg  5 mg Oral DAILY PRN  
 sucralfate (CARAFATE) tablet 1 g  1 g Oral AC&HS  influenza vaccine 2019-20 (6 mos+)(PF) (FLUARIX/FLULAVAL/FLUZONE QUAD) injection 0.5 mL  0.5 mL IntraMUSCular PRIOR TO DISCHARGE  hydrALAZINE (APRESOLINE) 20 mg/mL injection 20 mg  20 mg IntraVENous Q6H PRN  
 dextrose 10 % infusion 125-250 mL  125-250 mL IntraVENous PRN No Known Allergies Objective: 
Vitals:   
Vitals:  
 12/22/19 0561 12/22/19 0737 12/22/19 0823 12/22/19 1119 BP:      
Pulse:  71  67 Resp:  18  14 Temp:  97 °F (36.1 °C)  97.5 °F (36.4 °C) SpO2:  97% 92% 96% Weight: 79.5 kg (175 lb 4.3 oz) Height:      
 
Intake and Output: 
12/22 0701 - 12/22 1900 In: 06967.7 [P.O.:600; I.V.:21.7] Out: 90733  
12/20 1901 - 12/22 0700 In: 41219 [P.O.:360; I.V.:180] Out: 85322 Physical Examination: 
 
General: Elderly man in no acute distress HEENT:           [place]:  No lines Resp:  Rales + CV:  VAD sounds; No LE edema GI:  Soft and non-tender; no distension Neurologic:  Alert and appropriate; normal speech :  + neal; gross hematuria [x]    High complexity decision making was performed 
[x]    Patient is at high-risk of decompensation with multiple organ involvement Lab Data Personally Reviewed: I have reviewed all the pertinent labs, microbiology data and radiology studies during assessment. Recent Labs  
  12/22/19 
0405 12/21/19 
0016 12/20/19 
0014 * 131* 131*  
K 3.5 3.7 4.1 CL 94* 92* 93* CO2 37* 38* 36* * 113* 142* BUN 32* 35* 38* CREA 1.68* 1.79* 1.92* CA 9.1 8.9 8.7 MG 2.0 1.8 1.8 ALB 2.5* 2.4* 2.2*  
SGOT 25 23 21 ALT 16 13 12 INR 1.2* 1.2* 1.3* Recent Labs  
  12/22/19 
1219 12/22/19 
0405 12/21/19 
2150 12/21/19 
1758 12/21/19 
0016  12/20/19 
0014 WBC  --  4.7  --   --  4.6  --  5.1 HGB 8.2* 7.7* 7.8* 7.6* 7.2*   < > 6.8* HCT 26.2* 25.0* 24.8* 23.9* 22.9*   < > 21.6* PLT  --  178  --   --  164  --  165  
 < > = values in this interval not displayed. No results found for: SDES Lab Results Component Value Date/Time Culture result: NO GROWTH 5 DAYS 12/15/2019 03:37 PM  
 Culture result: NO GROWTH 1 DAY 12/15/2019 03:30 PM  
 Culture result: NO GROWTH 5 DAYS 12/06/2019 11:23 PM  
 Culture result: HEAVY ENTEROCOCCUS SPECIES NOTED (A) 11/27/2019 11:10 AM  
 Culture result: LIGHT YEAST (A) 11/27/2019 11:10 AM  
 
Recent Results (from the past 24 hour(s)) GLUCOSE, POC Collection Time: 12/21/19  5:29 PM  
Result Value Ref Range Glucose (POC) 111 (H) 65 - 100 mg/dL Performed by Laury Toussaint, POC Collection Time: 12/21/19  9:25 PM  
Result Value Ref Range Glucose (POC) 172 (H) 65 - 100 mg/dL Performed by Leatha Moore (CON) HGB & HCT Collection Time: 12/21/19  9:50 PM  
Result Value Ref Range HGB 7.8 (L) 12.1 - 17.0 g/dL HCT 24.8 (L) 36.6 - 50.3 % METABOLIC PANEL, COMPREHENSIVE Collection Time: 12/22/19  4:05 AM  
Result Value Ref Range Sodium 134 (L) 136 - 145 mmol/L  Potassium 3.5 3.5 - 5.1 mmol/L  
 Chloride 94 (L) 97 - 108 mmol/L  
 CO2 37 (H) 21 - 32 mmol/L Anion gap 3 (L) 5 - 15 mmol/L Glucose 104 (H) 65 - 100 mg/dL BUN 32 (H) 6 - 20 MG/DL Creatinine 1.68 (H) 0.70 - 1.30 MG/DL  
 BUN/Creatinine ratio 19 12 - 20 GFR est AA 49 (L) >60 ml/min/1.73m2 GFR est non-AA 41 (L) >60 ml/min/1.73m2 Calcium 9.1 8.5 - 10.1 MG/DL Bilirubin, total 0.7 0.2 - 1.0 MG/DL  
 ALT (SGPT) 16 12 - 78 U/L  
 AST (SGOT) 25 15 - 37 U/L Alk. phosphatase 111 45 - 117 U/L Protein, total 6.8 6.4 - 8.2 g/dL Albumin 2.5 (L) 3.5 - 5.0 g/dL Globulin 4.3 (H) 2.0 - 4.0 g/dL A-G Ratio 0.6 (L) 1.1 - 2.2 MAGNESIUM Collection Time: 12/22/19  4:05 AM  
Result Value Ref Range Magnesium 2.0 1.6 - 2.4 mg/dL CBC W/O DIFF Collection Time: 12/22/19  4:05 AM  
Result Value Ref Range WBC 4.7 4.1 - 11.1 K/uL  
 RBC 2.66 (L) 4.10 - 5.70 M/uL HGB 7.7 (L) 12.1 - 17.0 g/dL HCT 25.0 (L) 36.6 - 50.3 % MCV 94.0 80.0 - 99.0 FL  
 MCH 28.9 26.0 - 34.0 PG  
 MCHC 30.8 30.0 - 36.5 g/dL  
 RDW 17.3 (H) 11.5 - 14.5 % PLATELET 285 699 - 901 K/uL MPV 9.2 8.9 - 12.9 FL  
 NRBC 0.0 0  WBC ABSOLUTE NRBC 0.00 0.00 - 0.01 K/uL PROTHROMBIN TIME + INR Collection Time: 12/22/19  4:05 AM  
Result Value Ref Range INR 1.2 (H) 0.9 - 1.1 Prothrombin time 12.5 (H) 9.0 - 11.1 sec PTT Collection Time: 12/22/19  4:05 AM  
Result Value Ref Range aPTT 29.6 22.1 - 32.0 sec  
 aPTT, therapeutic range     58.0 - 77.0 SECS  
NT-PRO BNP Collection Time: 12/22/19  4:05 AM  
Result Value Ref Range NT pro-BNP 2,041 (H) <125 PG/ML  
DIGOXIN Collection Time: 12/22/19  4:05 AM  
Result Value Ref Range Digoxin level 0.9 0.90 - 2.00 NG/ML  
LACTIC ACID Collection Time: 12/22/19  4:05 AM  
Result Value Ref Range Lactic acid 0.8 0.4 - 2.0 MMOL/L  
PROCALCITONIN Collection Time: 12/22/19  4:05 AM  
Result Value Ref Range Procalcitonin 0.07 ng/mL IRON PROFILE  
 Collection Time: 12/22/19  4:05 AM  
Result Value Ref Range Iron 35 35 - 150 ug/dL TIBC 196 (L) 250 - 450 ug/dL Iron % saturation 18 (L) 20 - 50 % FERRITIN Collection Time: 12/22/19  4:05 AM  
Result Value Ref Range Ferritin 683 (H) 26 - 388 NG/ML  
TYPE & SCREEN Collection Time: 12/22/19  4:05 AM  
Result Value Ref Range Crossmatch Expiration 12/25/2019 ABO/Rh(D) O POSITIVE Antibody screen NEG Comment Previously identified nonspecific antibody and nonspecific cold antibody ROYER Poly POS   
 ROYER IgG POS   
 ROYER C3b/C3d NEG Unit number [id number] Blood component type RC LR Unit division 00 Status of unit ALLOCATED Crossmatch result Compatible LD Collection Time: 12/22/19  4:05 AM  
Result Value Ref Range  85 - 241 U/L  
GLUCOSE, POC Collection Time: 12/22/19  6:52 AM  
Result Value Ref Range Glucose (POC) 118 (H) 65 - 100 mg/dL Performed by Chayo Willson (CON) GLUCOSE, POC Collection Time: 12/22/19 11:20 AM  
Result Value Ref Range Glucose (POC) 144 (H) 65 - 100 mg/dL Performed by Grover Head HGB & HCT Collection Time: 12/22/19 12:19 PM  
Result Value Ref Range HGB 8.2 (L) 12.1 - 17.0 g/dL HCT 26.2 (L) 36.6 - 50.3 % Timur Lagos MD

## 2019-12-22 NOTE — PROGRESS NOTES
0730:  Bedside shift change report given to Navin Shepherd RN (oncoming nurse) by Anamaria Cardenas RN (offgoing nurse). Report included the following information SBAR, Kardex, Procedure Summary, Intake/Output, MAR, and Recent Results. 0900:  Patient neal manually irrigated due to complaints of discomfort. Three small clots irrigated. Patient's states he feels better. 0930:  Patient had small soft bowel movement. 1300:  Patient wife at bedside. 1540:  Patient states \"I feel like I need to have a bowel movement, theres so much pressure down there\". Previously held miralax given, PRN dulcolax given. 1700: Three RNs assisted patient to bedside commode. 1730:  Patient had very large bowel movement. 1930:  Bedside shift change report given to Anamaria Cardenas RN (oncoming nurse) by Navin Shepherd RN (offgoing nurse). Report included the following information SBAR, Kardex, Procedure Summary, Intake/Output, MAR and Recent Results. Problem: Falls - Risk of 
Goal: *Absence of Falls Description Document Bishop Hendrix Fall Risk and appropriate interventions in the flowsheet. Outcome: Progressing Towards Goal 
Note: Fall Risk Interventions: 
Mobility Interventions: Communicate number of staff needed for ambulation/transfer, Patient to call before getting OOB Mentation Interventions: Adequate sleep, hydration, pain control, Evaluate medications/consider consulting pharmacy, Eyeglasses and hearing aids, HELP ([address]) if available, Gait belt with transfers/ambulation Medication Interventions: Teach patient to arise slowly, Utilize gait belt for transfers/ambulation Elimination Interventions: Call light in reach, Patient to call for help with toileting needs History of Falls Interventions: Evaluate medications/consider consulting pharmacy, Investigate reason for fall, Room close to nurse's station Problem: Pain Goal: *Control of Pain Outcome: Progressing Towards Goal 
  
 Problem: Heart Failure: Discharge Outcomes Goal: *Describes available resources and support systems Description 
(eg: Home Health, Palliative Care, Advanced Medical Directive) Outcome: Progressing Towards Goal 
  
Problem: Infection - Risk of, Urinary Catheter-Associated Urinary Tract Infection Goal: *Absence of infection signs and symptoms Outcome: Progressing Towards Goal 
  
Problem: LVAD Post-op Day 15 to Discharge Goal: Nutrition/Diet Outcome: Progressing Towards Goal 
  
Problem: Impaired Skin Integrity/Pressure Injury Treatment Goal: *Improvement of Existing Pressure Injury Outcome: Progressing Towards Goal

## 2019-12-22 NOTE — PROGRESS NOTES
[address] [facility] in [place], [place] Inpatient Progress Note Patient name: Natalie Lofton Patient : 1950 Patient MRN: [id number] Attending MD: Nisha Payton MD 
Date of service: 19 Chief Complaint:  
Alissa Banregis azucena LVAD implant 
  
HPI: Sona Kiser is a 71y.o. year old pleasant white male with a history of HTN, HLD, JOSE, CAD s/p cardiac arrest VFib s/p CABG () c/b sternal would infection and sternectomy, ischemic cardiomyopathy LVEF 15-20%, s/p ICD and with LBBB. He was admitted with acute on chronic systolic heart failure with massive volume overload > 20 lbs, in the setting of atrial fibrillation s/p failed DCCV and underwent DCCV and RHC on .  S/p BiVICD on 19 with Dr. Louise Wisdom. He was discharged home home on IV milrinone on 19. [facility] has been followed closely by Dr. Vargas Figueroa and the [facility]. 
  
Mr. Kiser was admitted for acute on chronic systolic heart failure. He underwent implantation of Impella 5.0 due to CS and has completed his LVAD evaluation. [facility] meets criteria for implant of HM3 as DT. He was NYHA Class IV prior to implant of Impella 5.0, has LVEF < 15%, was intolerant of GDMT due to symptomatic hypotension and renal dysfunction. He remains dependent on temporary MCS support. RHC  revealed compensated hemodynamics on Impella. His renal function has improved dramatically with improvement in his hemodynamics. He is not a suitable transplant candidate due to single kidney, sternectomy, debility, and frailty. He was reviewed by Martin Curry and felt to be a high risk heart transplant candidate due to multiple co-morbidities as well as the afore mentioned conditions.  He remained in the CCU and underwent a HM 3 implant as DT on . He was weaned off of pressors and transferred to [place] [address] where he continues to undergo PT/OT and hemodynamic optimization.   
  
24Hr Events MAPs stable Hgb slowly improving INR 1.3 Sleepy Procedure:  Procedure(s): REMOVAL TEMPORARY LEFT VENTRICULAR ASSIST DEVICE, IMPLANTATION OF LEFT VENTRICULAR ASSIST DEVICE PERMANENT (VAD), ECC, JACQUE, EPI AORTIC US BY DR Albertina Haas.    
POD:  33 
  
Impression / Plan:  
Heart Failure Status: NYHA Class IV Chronic systolic heart failure due to ICM, NYHA Class IV, EF < 15%, cardiogenic shock bridged with Impella 5.0 support to HM 3 implant S/p Impella removal and LVAD implant 11/18/19 RPMs 6400 rpm - frequent PI events TTE with bubble study negative for PFO Continue milrinone 0.2mcg/kg/min - consider wean tomorrow if PI events decrease Unable to obtain CardioMEMS readings over the weekend - try on Monday Continue Bumex 2 mg IV BID Cont Sildenafil 40 mg PO TID - rx sent to local pharmacy to initiate PA Intolerant of BB due to RV failure Intolerant of ACEi/ARB/ARNI/AA due to JUAN J on CKD 3 Strict I/O, daily weights, Na+ restricted diet Continue LVAD education Remove suture from drive line exit site on 12/23 and decrease dressing change frequency to three times weekly TTE 12/16- EF 15-20% QTc 374 12/18/19 Generalized myoclonus Improved Appreciate Neurology input Continue Keppra with renal dosing Head CT negative for acute process EEG suggestive of mild generalized encephalopathic process, possibly related to underlying structural brain injury vs metabolic abnormality Monitor closely  
  
Anticoagulation for LVAD, INR goal 2-3 DC ASA d/t hematuria INR 1.3 Cont coumadin Epistaxis Resolved Afrin soaked guaze ENT consult appreciated Monitor for now  
  
Acute Respiratory Failure post op Improved with aggressive diuresis Off supplemental O2 
TTE with Bubble study negative for PFO Pulmonary Consult appreciated Pulmonary hygiene Cont home CPAP- must use while sleeping Acute on CKD 3, atrophic left kidney Appreciate Nephrology assistance Watch Cr - improving Daily diuretic dosing Avoid nephrotoxins, trend labs Renally dose meds  
  
CAD s/p CABG Off ASA d/t hematuria No BB d/t RV failure Hold statin due to recent hepatic failure LHC performed 11/13 - low likelihood of benefit from revascularization  
  
Hx of VF arrest s/p BiV-ICD No recent shocks Keep K > 4 and Mg > 2  
   
PAF Dig level 0.9 -cont dig (62.5 mcg qMWF) No BB due to RV failure Repeat TFTs WNL 
  
Hx of gout Uric acid WNL Acute blood loss anemia Likely due to hematuria Cont octreotide 25 mcg SQ TID Transfuse to keep Hgb > 7.0 Fecal occult 12/14 negative, positive on 12/17 Appreciate urology recommendations Cont CBI GI following, low suspicion for GIB 
  
Suspected aspiration pneumonia Sputum culture showing scant growth of yeast 
Cefepime complete Urinary retention, c/b serratia UTI and hematuria Appreciate Urology consult Repeat UA with cx negative 12/15 Watch off abx Cystoscopy performed 11/13 shows catheter induced cystitis Sepsis Alert Paired Blood Neg 
Urine cx negative Sputum cx when able Malnutrition Appreciate Nutritionist consult Prealbumin weekly - 13 on 12/16 Diet as tolerated Intolerant of mirtazapine d/t tremors, confusion Decrease Marinol to 2.5 mg PO qPM d/t lethargy Cont MVI 
  
COPD Appreciate Pulmonology assistance Continue nebs PRN   
  
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  
  
Hx of sternal wound infection, sternectomy Sternum closed post op- monitor  
  
Vocal cord paralysis Improved ENT recs appreciated Neck CT- R neck edema, no airway compromise Speech therapy following - appreciate input Anxiety Klonipin 0.5 mg TID prn anxiety Depression Cont lexapro 10 mg qpm 
Monitor QTc Monitor sodium  
  
Debility Continue PT/OT  
  
Ppx Protonix PT/OT Bowel regimen PICC placed 11/22/19  
  
Dispo: Will need IP rehab. Not ready for discharge at this time LVAD INTERROGATION: 
Device interrogated in person Device function normal, normal flow, multiple PI events LVAD Pump Speed (RPM): 6400 Pump Flow (LPM): 5.2 MAP: 76 
PI (Pulsitility Index): 6.2 Power: 5.7  Test: Yes 
Back Up  at Bedside & Labeled: Yes Power Module Test: Yes Driveline Site Care: No 
Driveline Dressing: Clean, Dry, and Intact Outpatient: No 
MAP in Therapeutic Range (Outpatient): Yes Testing Alarms Reviewed: Yes 
Back up SC speed: 6400 Back up Low Speed Limit: 6000 Emergency Equipment with Patient?: Yes Emergency procedures reviewed?: Yes Drive line site inspected?: Yes Drive line intergrity inspected?: Yes Drive line dressing changed?: No 
 
PHYSICAL EXAM: 
Visit Vitals BP 91/72 (BP 1 Location: Left arm, BP Patient Position: At rest) Pulse 75 Temp 97.5 °F (36.4 °C) Resp 16 Ht 6' 2\" (1.88 m) Wt 175 lb 4.3 oz (79.5 kg) SpO2 92% BMI 22.50 kg/m² Physical Exam  
Constitutional: He is oriented to person, place, and time. He appears well-developed. He appears cachectic. No distress. HENT:  
Head: Normocephalic. Neck: Normal range of motion. Neck supple. No JVD present. Cardiovascular: Normal rate, regular rhythm and normal heart sounds. + VAD hum Pulmonary/Chest: Effort normal and breath sounds normal. No respiratory distress. Abdominal: Soft. Bowel sounds are normal. He exhibits no distension. Dobhoff in place Genitourinary:    Genitourinary Comments: Hematuria Musculoskeletal: Normal range of motion. General: No edema. Neurological: He is alert and oriented to person, place, and time. Skin: Skin is warm and dry. Nursing note and vitals reviewed. REVIEW OF SYSTEMS: 
Review of Systems Constitutional: Positive for malaise/fatigue. Negative for chills and fever. HENT: Positive for hearing loss. Negative for nosebleeds. Respiratory: Negative for cough and shortness of breath. Mild dyspnea Cardiovascular: Negative for chest pain, palpitations, orthopnea and leg swelling. Gastrointestinal: Negative for heartburn, nausea and vomiting. Genitourinary: Positive for hematuria. Musculoskeletal: Negative. Neurological: Positive for weakness. Negative for dizziness and headaches. Endo/Heme/Allergies: Bruises/bleeds easily. PAST MEDICAL HISTORY: 
Past Medical History:  
Diagnosis Date  Degenerative disc disease, lumbar  Heart failure ([facility] [address]  High cholesterol  Hypertension  Paroxysmal atrial fibrillation ([facility] [address] 4/2/2019  Spinal stenosis PAST SURGICAL HISTORY: 
Past Surgical History:  
Procedure Laterality Date  COLONOSCOPY Left 11/11/2019 COLONOSCOPY at bedside performed by Sid Ray MD at [address] [facility]  HX CORONARY ARTERY BYPASS GRAFT    
 triple  HX HERNIA REPAIR    
 HX IMPLANTABLE CARDIOVERTER DEFIBRILLATOR  WI CARDIOVERSION ELECTIVE ARRHYTHMIA EXTERNAL N/A 6/10/2019 EP CARDIOVERSION performed by Louann Valdivia MD at [place] [address], [address] CATH LAB  WI CARDIOVERSION ELECTIVE ARRHYTHMIA EXTERNAL N/A 6/18/2019 EP CARDIOVERSION performed by Bossman Reid MD at [place] [address], [address] CATH LAB  WI INSJ/RPLCMT PERM DFB W/TRNSVNS LDS 1/DUAL CHMBR N/A 6/21/2019 INSERT ICD BIV MULTI performed by Marii Rey MD at [place] [address], [facility] CATH LAB  WI TCAT IMPL WRLS P-ART PRS SNR L-T HEMODYN MNTR N/A 9/18/2019 IMPLANT HEART FAILURE MONITORING DEVICE performed by Aurora Lucio MD at [place] [address], [address] CATH LAB FAMILY HISTORY: 
Family History Problem Relation Age of Onset  Lung Disease Mother  Hypertension Mother Juan C Johns Arthritis-osteo Mother  Heart Disease Mother  Heart Disease Father  Diabetes Father SOCIAL HISTORY: 
Social History Socioeconomic History  Marital status:  Spouse name: Not on file  Number of children: Not on file  Years of education: Not on file  Highest education level: Not on file Tobacco Use  Smoking status: Former Smoker Last attempt to quit: 2010 Years since quittin.0  Smokeless tobacco: Never Used Substance and Sexual Activity  Alcohol use: Not Currently Comment: rarely  Drug use: Never Other Topics Concern LABORATORY RESULTS: 
  
Labs Latest Ref Rng & Units 2019 WBC 4.1 - 11.1 K/uL - 4.7 - - 4.6 - -  
RBC 4.10 - 5.70 M/uL - 2.66(L) - - 2.48(L) - - Hemoglobin 12.1 - 17.0 g/dL 8. 2(L) 7. 7(L) 7. 8(L) 7. 6(L) 7. 2(L) 7. 6(L) 7. 2(L) Hematocrit 36.6 - 50.3 % 26. 2(L) 25. 0(L) 24. 8(L) 23. 9(L) 22. 9(L) 24. 1(L) 22. 6(L) MCV 80.0 - 99.0 FL - 94.0 - - 92.3 - - Platelets 608 - 886 K/uL - 178 - - 164 - - Lymphocytes 12 - 49 % - - - - - - - Monocytes 5 - 13 % - - - - - - - Eosinophils 0 - 7 % - - - - - - - Basophils 0 - 1 % - - - - - - - Albumin 3.5 - 5.0 g/dL - 2. 5(L) - - 2. 4(L) - -  
Calcium 8.5 - 10.1 MG/DL - 9.1 - - 8.9 - -  
SGOT 15 - 37 U/L - 25 - - 23 - -  
Glucose 65 - 100 mg/dL - 104(H) - - 113(H) - -  
BUN 6 - 20 MG/DL - 32(H) - - 35(H) - - Creatinine 0.70 - 1.30 MG/DL - 1.68(H) - - 1.79(H) - - Sodium 136 - 145 mmol/L - 134(L) - - 131(L) - - Potassium 3.5 - 5.1 mmol/L - 3.5 - - 3.7 - -  
TSH 0.36 - 3.74 uIU/mL - - - - - - -  
PSA 0.01 - 4.0 ng/mL - - - - - - -  
LDH 85 - 241 U/L - 225 - - 248(H) - -  
CEA ng/mL - - - - - - - Some recent data might be hidden Lab Results Component Value Date/Time TSH 2.30 2019 03:29 AM  
 TSH 2.40 10/25/2019 07:39 PM  
 TSH 2.45 2019 04:16 AM  
 
 
ALLERGY: 
No Known Allergies CURRENT MEDICATIONS: 
Current Facility-Administered Medications Medication Dose Route Frequency  warfarin (COUMADIN) tablet 5 mg  5 mg Oral ONCE  
  [START ON 12/23/2019] dronabinol (MARINOL) capsule 2.5 mg  2.5 mg Oral DAILY  levETIRAcetam (KEPPRA) tablet 250 mg  250 mg Oral BID  senna-docusate (PERICOLACE) 8.6-50 mg per tablet 1 Tab  1 Tab Oral BID  polyethylene glycol (MIRALAX) packet 17 g  17 g Oral DAILY  albuterol-ipratropium (DUO-NEB) 2.5 MG-0.5 MG/3 ML  3 mL Nebulization Q6H PRN  
 bumetanide (BUMEX) injection 2 mg  2 mg IntraVENous Q12H  therapeutic multivitamin (THERAGRAN) tablet 1 Tab  1 Tab Oral DAILY  escitalopram oxalate (LEXAPRO) tablet 10 mg  10 mg Oral QPM  
 potassium chloride SR (KLOR-CON 10) tablet 40 mEq  40 mEq Oral DAILY  alteplase (CATHFLO) 1 mg in sterile water (preservative free) 1 mL injection  1 mg InterCATHeter PRN  finasteride (PROSCAR) tablet 5 mg  5 mg Oral DAILY  oxymetazoline (AFRIN) 0.05 % nasal spray 2 Spray  2 Spray Both Nostrils BID  spironolactone (ALDACTONE) tablet 25 mg  25 mg Oral DAILY  clonazePAM (KlonoPIN) tablet 0.5 mg  0.5 mg Oral TID PRN  
 milrinone (PRIMACOR) 20 MG/100 ML D5W infusion  0.2 mcg/kg/min IntraVENous CONTINUOUS  
 sildenafil (pulm.hypertension) (REVATIO) tablet 40 mg  40 mg Oral TID  magnesium oxide (MAG-OX) tablet 400 mg  400 mg Oral BID  diphenhydrAMINE (BENADRYL) capsule 25 mg  25 mg Oral QHS PRN  
 octreotide (SANDOSTATIN) injection 25 mcg  25 mcg SubCUTAneous TID  benzocaine-menthol (CEPACOL) lozenge 1 Lozenge  1 Lozenge Mucous Membrane PRN  
 digoxin (LANOXIN) tablet 0.0625 mg  62.5 mcg Oral Q MON, WED & FRI  pantoprazole (PROTONIX) tablet 40 mg  40 mg Oral ACB  allopurinol (ZYLOPRIM) tablet 100 mg  100 mg Oral DAILY  arformoterol (BROVANA) neb solution 15 mcg  15 mcg Nebulization BID RT And  
 budesonide (PULMICORT) 500 mcg/2 ml nebulizer suspension  500 mcg Nebulization BID RT  
 artificial saliva (MOUTH KOTE) 1 Spray  1 Spray Oral PRN  
 lactobac ac& pc-s.therm-b.anim (TIAN Q/RISAQUAD)  1 Cap Oral DAILY  oxyCODONE IR (ROXICODONE) tablet 5 mg  5 mg Oral Q6H PRN  
 balsam peru-castor oil (VENELEX) ointment   Topical TID  tamsulosin (FLOMAX) capsule 0.4 mg  0.4 mg Oral DAILY  insulin lispro (HUMALOG) injection   SubCUTAneous AC&HS  Warfarin MD/NP dosing   Other PRN  
 epoetin yvonne-epbx (RETACRIT) injection 20,000 Units  20,000 Units SubCUTAneous Q7D  
 sodium chloride (NS) flush 5-40 mL  5-40 mL IntraVENous Q8H  
 sodium chloride (NS) flush 5-40 mL  5-40 mL IntraVENous PRN  
 acetaminophen (TYLENOL) tablet 650 mg  650 mg Oral Q6H PRN  
 naloxone (NARCAN) injection 0.4 mg  0.4 mg IntraVENous PRN  
 ondansetron (ZOFRAN) injection 4 mg  4 mg IntraVENous Q4H PRN  
 bisacodyl (DULCOLAX) tablet 5 mg  5 mg Oral DAILY PRN  
 sucralfate (CARAFATE) tablet 1 g  1 g Oral AC&HS  influenza vaccine 2019-20 (6 mos+)(PF) (FLUARIX/FLULAVAL/FLUZONE QUAD) injection 0.5 mL  0.5 mL IntraMUSCular PRIOR TO DISCHARGE  hydrALAZINE (APRESOLINE) 20 mg/mL injection 20 mg  20 mg IntraVENous Q6H PRN  
 dextrose 10 % infusion 125-250 mL  125-250 mL IntraVENous PRN Thank you for allowing me to participate in this patient's care. America Halsted, NP [address] [facility] [address] [place] [address], [address] Phone: [phone] Fax: [phone]

## 2019-12-22 NOTE — PROGRESS NOTES
[facility] VAD/Heart Failure Progress Note Admit Date: 10/25/2019 POD:  27 Days Post-Op Procedure:  Procedure(s): LEFT BRACHIAL THROMBECTOMY Subjective:  
Mild dyspnea, fatigue, and weakness; continue to work on hematuria Objective:  
Vitals: 
Blood pressure 91/72, pulse 75, temperature 97.5 °F (36.4 °C), resp. rate 16, height 6' 2\" (1.88 m), weight 175 lb 4.3 oz (79.5 kg), SpO2 92 %. Temp (24hrs), Av.6 °F (36.4 °C), Min:97 °F (36.1 °C), Max:98 °F (36.7 °C) Hemodynamics: 
 CO: CO (l/min): 5.8 l/min CI: CI (l/min/m2): 2.8 l/min/m2 CVP: CVP (mmHg): 4 mmHg (19 1645) SVR: SVR (dyne*sec)/cm5: 1080 (dyne*sec)/cm5 (19 1200) PAP Systolic: PAP Systolic: 34 ( 7319) PAP Diastolic: PAP Diastolic: 13 (83/62/28 6584) PVR:   
 SV02: SVO2 (%): 67 % (19 1300) SCV02: SCVO2 (%): 75 % (10/29/19 1900) VAD Interrogation: LVAD (Heartmate) Pump Speed (RPM): 6400 Pump Flow (LPM): 5.2 PI (Pulsitility Index): 6.2 Power: 5.7 MAP: 90  Test: Yes 
Back Up  at Bedside & Labeled: Yes Power Module Test: Yes Driveline Site Care: No 
Driveline Dressing: Clean, Dry, and Intact EKG/Rhythm:   
 
Extubation Date / Time:  
 
CT Output:  
 
Ventilator: 
Ventilator Volumes Vt Set (ml): 550 ml (19 08) Vt Exhaled (Machine Breath) (ml): 639 ml (19 08) Vt Spont (ml): 437 ml (19 1035) Ve Observed (l/min): 7.25 l/min (19 1035) Oxygen Therapy: 
Oxygen Therapy O2 Sat (%): 92 % (19) Pulse via Oximetry: 78 beats per minute (19) O2 Device: Room air (19) O2 Flow Rate (L/min): 1 l/min (19) FIO2 (%): 21 % (19) CXR: 
 
Admission Weight: Last Weight Weight: 192 lb 10.9 oz (87.4 kg) Weight: 175 lb 4.3 oz (79.5 kg) Intake / Output / Drain: 
Current Shift: 701 -  190 In: 64739.1 [P.O.:960; I.V.:40.1] Out: 32572 Last 24 hrs.:  
 
 Intake/Output Summary (Last 24 hours) at 12/22/2019 1623 Last data filed at 12/22/2019 0268 Gross per 24 hour Intake 57554.09 ml Output 51648 ml Net -1704.91 ml No results for input(s): CPK, CKMB, TROIQ in the last 72 hours. Recent Labs  
  12/22/19 
1219 12/22/19 
0405 12/21/19 
2150  12/21/19 
0016  12/20/19 
0014 NA  --  134*  --   --  131*  --  131* K  --  3.5  --   --  3.7  --  4.1 CO2  --  37*  --   --  38*  --  36* BUN  --  32*  --   --  35*  --  38* CREA  --  1.68*  --   --  1.79*  --  1.92* GLU  --  104*  --   --  113*  --  142* MG  --  2.0  --   --  1.8  --  1.8 WBC  --  4.7  --   --  4.6  --  5.1 HGB 8.2* 7.7* 7.8*   < > 7.2*   < > 6.8* HCT 26.2* 25.0* 24.8*   < > 22.9*   < > 21.6* PLT  --  178  --   --  164  --  165  
 < > = values in this interval not displayed. Recent Labs  
  12/22/19 
0405 12/21/19 
0016 12/20/19 
0014 INR 1.2* 1.2* 1.3* PTP 12.5* 11.9* 13.0* APTT 29.6 29.3 31.7 No lab exists for component: PBNP Current Facility-Administered Medications:  
  [START ON 12/23/2019] dronabinol (MARINOL) capsule 2.5 mg, 2.5 mg, Oral, DAILY, Polliard, Ira Romeo, NP 
  levETIRAcetam (KEPPRA) tablet 250 mg, 250 mg, Oral, BID, Polliard, Ira Romeo, NP 
  senna-docusate (PERICOLACE) 8.6-50 mg per tablet 1 Tab, 1 Tab, Oral, BID, Polliard, Ira Romeo, NP, Stopped at 12/22/19 0900   polyethylene glycol (MIRALAX) packet 17 g, 17 g, Oral, DAILY, Polliard, Hamp Pallas T, NP, 17 g at 12/22/19 1533   albuterol-ipratropium (DUO-NEB) 2.5 MG-0.5 MG/3 ML, 3 mL, Nebulization, Q6H PRN, Fiser, Rojelio Bernheim, MD 
  [facility]) injection 2 mg, 2 mg, IntraVENous, Q12H, Logan Kincaid MD, 2 mg at 12/22/19 5482   therapeutic multivitamin (THERAGRAN) tablet 1 Tab, 1 Tab, Oral, DAILY, Shana Sims NP, 1 Tab at 12/22/19 0157   escitalopram oxalate (LEXAPRO) tablet 10 mg, 10 mg, Oral, QPM, Mounika Altman MD, 10 mg at 12/21/19 1967   potassium chloride SR (KLOR-CON 10) tablet 40 mEq, 40 mEq, Oral, DAILY, Shana Sims NP, 40 mEq at 12/22/19 4314   alteplase (CATHFLO) 1 mg in sterile water (preservative free) 1 mL injection, 1 mg, InterCATHeter, PRN, Mounika Carvalho MD, 1 mg at 12/18/19 0535   finasteride (PROSCAR) tablet 5 mg, 5 mg, Oral, DAILY, Hilliard Hodgkin, MD, 5 mg at 12/22/19 7158   spironolactone (ALDACTONE) tablet 25 mg, 25 mg, Oral, DAILY, Mounika Altman MD, 25 mg at 12/22/19 2009   clonazePAM (KlonoPIN) tablet 0.5 mg, 0.5 mg, Oral, TID PRN, Mounika Altman MD, 0.5 mg at 12/22/19 1534 
  milrinone (PRIMACOR) 20 MG/100 ML D5W infusion, 0.2 mcg/kg/min, IntraVENous, CONTINUOUS, Shana Sims NP, Last Rate: 5 mL/hr at 12/22/19 1441, 0.2 mcg/kg/min at 12/22/19 1441   sildenafil (pulm.hypertension) (REVATIO) tablet 40 mg, 40 mg, Oral, TID, Mounika Altman MD, 40 mg at 12/22/19 1610 
  magnesium oxide (MAG-OX) tablet 400 mg, 400 mg, Oral, BID, PollAlix capone NP, 400 mg at 12/22/19 9144   diphenhydrAMINE (BENADRYL) capsule 25 mg, 25 mg, Oral, QHS PRN, Alix Herrera NP 
  octreotide (SANDOSTATIN) injection 25 mcg, 25 mcg, SubCUTAneous, TID, Erna Herrera NP, 25 mcg at 12/22/19 1610 
  benzocaine-menthol (CEPACOL) lozenge 1 Lozenge, 1 Lozenge, Mucous Membrane, PRN, Alix Herrera NP 
  digoxin (LANOXIN) tablet 0.0625 mg, 62.5 mcg, Oral, Q MON, WED & FRI, Polliard, Mozella Arrow, NP, 0.0625 mg at 12/20/19 2232   pantoprazole (PROTONIX) tablet 40 mg, 40 mg, Oral, ACB, Erna Herrera NP, 40 mg at 12/22/19 5691   allopurinol (ZYLOPRIM) tablet 100 mg, 100 mg, Oral, DAILY, Erna Herrera NP, 100 mg at 12/22/19 0843 
  arformoterol (BROVANA) neb solution 15 mcg, 15 mcg, Nebulization, BID RT, 15 mcg at 12/22/19 0820 **AND** budesonide (PULMICORT) 500 mcg/2 ml nebulizer suspension, 500 mcg, Nebulization, BID RT, Alix Herrera NP, 500 mcg at 12/22/19 8594   artificial saliva (MOUTH KOTE) 1 Spray, 1 Spray, Oral, PRN, Garo Herrera NP, 1 Spray at 12/12/19 1452   lactobac ac& pc-s.therm-b.anim (TIAN Q/RISAQUAD), 1 Cap, Oral, DAILY, Tan Madison MD, 1 Cap at 12/22/19 2427   oxyCODONE IR (ROXICODONE) tablet 5 mg, 5 mg, Oral, Q6H PRN, Dylan Lombardo MD, 5 mg at 12/22/19 0229 
  balsam peru-castor oil (VENELEX) ointment, , Topical, TID, Rivas Andrews MD 
  [facility]) capsule 0.4 mg, 0.4 mg, Oral, DAILY, Logan Kincaid MD, 0.4 mg at 12/22/19 8729   insulin lispro (HUMALOG) injection, , SubCUTAneous, AC&HS, Shana Sims NP, Stopped at 12/22/19 1630   Warfarin MD/NP dosing, , Other, PRN, Mounika Altman MD 
  epoetin yvonne-epbx (RETACRIT) injection 20,000 Units, 20,000 Units, SubCUTAneous, Q7D, Logan Kincaid MD, 20,000 Units at 12/17/19 0721 
  sodium chloride (NS) flush 5-40 mL, 5-40 mL, IntraVENous, Q8H, Harshal Clayton MD, 10 mL at 12/22/19 1400 
  sodium chloride (NS) flush 5-40 mL, 5-40 mL, IntraVENous, PRN, Dylan Lombardo MD, 40 mL at 12/17/19 7132   acetaminophen (TYLENOL) tablet 650 mg, 650 mg, Oral, Q6H PRN, Dylan Lombardo MD, 650 mg at 12/19/19 0721 
  naloxone [facility]) injection 0.4 mg, 0.4 mg, IntraVENous, PRN, Dylan Lombardo MD 
  ondansetron [facility]) injection 4 mg, 4 mg, IntraVENous, Q4H PRN, Dylan Lombardo MD, 4 mg at 12/18/19 1302   bisacodyl (DULCOLAX) tablet 5 mg, 5 mg, Oral, DAILY PRN, Dylan Lombardo MD, 5 mg at 12/22/19 1533 
  sucralfate (CARAFATE) tablet 1 g, 1 g, Oral, AC&HS, Dylan Lombardo MD, 1 g at 12/22/19 1610 
  influenza vaccine 2019-20 (6 mos+)(PF) (FLUARIX/FLULAVAL/FLUZONE QUAD) injection 0.5 mL, 0.5 mL, IntraMUSCular, PRIOR TO DISCHARGE, Roe Altman MD 
  hydrALAZINE (APRESOLINE) 20 mg/mL injection 20 mg, 20 mg, IntraVENous, Q6H PRN, Shana Sims NP, 20 mg at 12/10/19 3954   dextrose 10 % infusion 125-250 mL, 125-250 mL, IntraVENous, PRN, Pegge Yue, Asuncion Osorio MD, Stopped at 11/18/19 0700 A/P 
  
S/P LVAD - good flows Need for anti-coagulation - coumadin DM - insulin RV dysfunction - milrinone, diuretics  
  
Risk of morbidity and mortality - high Medical decision making - high complexity Signed By: Mary Aj MD

## 2019-12-22 NOTE — PROGRESS NOTES
[facility] VAD/Heart Failure Progress Note Admit Date: 10/25/2019 POD:  27 Days Post-Op Procedure:  Procedure(s): LEFT BRACHIAL THROMBECTOMY Subjective:  
Mild dyspnea, fatigue, and weakness; working on hematuria Objective:  
Vitals: 
Blood pressure 91/72, pulse 67, temperature 97.5 °F (36.4 °C), resp. rate 14, height 6' 2\" (1.88 m), weight 175 lb 4.3 oz (79.5 kg), SpO2 96 %. Temp (24hrs), Av.6 °F (36.4 °C), Min:97 °F (36.1 °C), Max:98 °F (36.7 °C) Hemodynamics: 
 CO: CO (l/min): 5.8 l/min CI: CI (l/min/m2): 2.8 l/min/m2 CVP: CVP (mmHg): 4 mmHg (19 1645) SVR: SVR (dyne*sec)/cm5: 1080 (dyne*sec)/cm5 (19 1200) PAP Systolic: PAP Systolic: 34 (48/77/89 6951) PAP Diastolic: PAP Diastolic: 13 (33//29 5052) PVR:   
 SV02: SVO2 (%): 67 % (19 1300) SCV02: SCVO2 (%): 75 % (10/29/19 1900) VAD Interrogation: LVAD (Heartmate) Pump Speed (RPM): 1578 Pump Flow (LPM): 3.7 PI (Pulsitility Index): 9.1 Power: 5.6 MAP: 90  Test: Yes 
Back Up  at Bedside & Labeled: Yes Power Module Test: Yes Driveline Site Care: No 
Driveline Dressing: Clean, Dry, and Intact EKG/Rhythm:   
 
Extubation Date / Time:  
 
CT Output:  
 
Ventilator: 
Ventilator Volumes Vt Set (ml): 550 ml (19 08) Vt Exhaled (Machine Breath) (ml): 639 ml (19 08) Vt Spont (ml): 437 ml (19 1035) Ve Observed (l/min): 7.25 l/min (19 1035) Oxygen Therapy: 
Oxygen Therapy O2 Sat (%): 96 % (19) Pulse via Oximetry: 78 beats per minute (19) O2 Device: Room air (19) O2 Flow Rate (L/min): 1 l/min (19) FIO2 (%): 21 % (19) CXR: 
 
Admission Weight: Last Weight Weight: 192 lb 10.9 oz (87.4 kg) Weight: 175 lb 4.3 oz (79.5 kg) Intake / Output / Drain: 
Current Shift: 701 - 1900 In: 50804.7 [P.O.:840; I.V.:21.7] Out: 93994 Last 24 hrs.:  
 
 Intake/Output Summary (Last 24 hours) at 12/22/2019 1455 Last data filed at 12/22/2019 1439 Gross per 24 hour Intake 19797.67 ml Output 50418 ml Net -3843.33 ml No results for input(s): CPK, CKMB, TROIQ in the last 72 hours. Recent Labs  
  12/22/19 
1219 12/22/19 
0405 12/21/19 
2150  12/21/19 
0016  12/20/19 
0014 NA  --  134*  --   --  131*  --  131* K  --  3.5  --   --  3.7  --  4.1 CO2  --  37*  --   --  38*  --  36* BUN  --  32*  --   --  35*  --  38* CREA  --  1.68*  --   --  1.79*  --  1.92* GLU  --  104*  --   --  113*  --  142* MG  --  2.0  --   --  1.8  --  1.8 WBC  --  4.7  --   --  4.6  --  5.1 HGB 8.2* 7.7* 7.8*   < > 7.2*   < > 6.8* HCT 26.2* 25.0* 24.8*   < > 22.9*   < > 21.6* PLT  --  178  --   --  164  --  165  
 < > = values in this interval not displayed. Recent Labs  
  12/22/19 
0405 12/21/19 
0016 12/20/19 
0014 INR 1.2* 1.2* 1.3* PTP 12.5* 11.9* 13.0* APTT 29.6 29.3 31.7 No lab exists for component: PBNP Current Facility-Administered Medications:  
  warfarin (COUMADIN) tablet 5 mg, 5 mg, Oral, ONCE, Aflonso Herrera NP 
  senna-docusate (PERICOLACE) 8.6-50 mg per tablet 1 Tab, 1 Tab, Oral, BID, Elsa Herrera NP, Stopped at 12/22/19 0900   polyethylene glycol (MIRALAX) packet 17 g, 17 g, Oral, DAILY, Alfonso Herrera NP, Stopped at 12/22/19 0900 
  albuterol-ipratropium (DUO-NEB) 2.5 MG-0.5 MG/3 ML, 3 mL, Nebulization, Q6H PRN, Kenyatta Andrews MD 
  0.9% sodium chloride infusion 250 mL, 250 mL, IntraVENous, PRN, Mounika Altman MD 
  0.9% sodium chloride infusion 250 mL, 250 mL, IntraVENous, PRN, Mounika Altman MD 
  bumetanide (BUMEX) injection 2 mg, 2 mg, IntraVENous, Q12H, Logan Kincaid MD, 2 mg at 12/22/19 0842 
  0.9% sodium chloride infusion 250 mL, 250 mL, IntraVENous, PRN, Sona Oshea MD 
  therapeutic multivitamin (THERAGRAN) tablet 1 Tab, 1 Tab, Oral, DAILY, Ciro Rowland NP, 1 Tab at 12/22/19 6226   dronabinol (MARINOL) capsule 2.5 mg, 2.5 mg, Oral, BID, Mounika Altman MD, 2.5 mg at 12/22/19 2813   escitalopram oxalate (LEXAPRO) tablet 10 mg, 10 mg, Oral, QPM, Mounika Altman MD, 10 mg at 12/21/19 1733   potassium chloride SR (KLOR-CON 10) tablet 40 mEq, 40 mEq, Oral, DAILY, Shana Sims NP, 40 mEq at 12/22/19 5243   alteplase (CATHFLO) 1 mg in sterile water (preservative free) 1 mL injection, 1 mg, InterCATHeter, PRN, Mounika Altman MD, 1 mg at 12/18/19 2075 
  0.9% sodium chloride infusion 250 mL, 250 mL, IntraVENous, PRN, Marta Herrera, NP 
  finasteride (PROSCAR) tablet 5 mg, 5 mg, Oral, DAILY, Gege Evans MD, 5 mg at 12/22/19 1541   dronabinol (MARINOL) capsule 2.5 mg, 2.5 mg, Oral, DAILY, Shana Sims NP, 2.5 mg at 12/22/19 0844 
  0.9% sodium chloride infusion 250 mL, 250 mL, IntraVENous, PRN, Shana Sims B, NP 
  0.9% sodium chloride infusion 250 mL, 250 mL, IntraVENous, PRN, Shana Sims, NP 
  oxymetazoline (AFRIN) 0.05 % nasal spray 2 Spray, 2 Spray, Both Nostrils, BID, Black Altman MD, 2 Spray at 12/22/19 0206   spironolactone (ALDACTONE) tablet 25 mg, 25 mg, Oral, DAILY, Mounika Altman MD, 25 mg at 12/22/19 0070   clonazePAM (KlonoPIN) tablet 0.5 mg, 0.5 mg, Oral, TID PRN, Mounika Altman MD, 0.5 mg at 12/21/19 2032 
  milrinone (PRIMACOR) 20 MG/100 ML D5W infusion, 0.2 mcg/kg/min, IntraVENous, CONTINUOUS, Shana Sims NP, Last Rate: 5 mL/hr at 12/22/19 1441, 0.2 mcg/kg/min at 12/22/19 1441   sildenafil (pulm.hypertension) (REVATIO) tablet 40 mg, 40 mg, Oral, TID, Mounika Altman MD, 40 mg at 12/22/19 0842 
  magnesium oxide (MAG-OX) tablet 400 mg, 400 mg, Oral, BID, Marta Herrera NP, 400 mg at 12/22/19 2066   diphenhydrAMINE (BENADRYL) capsule 25 mg, 25 mg, Oral, QHS PRN, Marta Herrera NP 
   octreotide (SANDOSTATIN) injection 25 mcg, 25 mcg, SubCUTAneous, TID, Polliard, Haley Ripple T, NP, 25 mcg at 12/21/19 2300 
  benzocaine-menthol (CEPACOL) lozenge 1 Lozenge, 1 Lozenge, Mucous Membrane, PRN, Polliard, [place] Fuse, NP 
  digoxin (LANOXIN) tablet 0.0625 mg, 62.5 mcg, Oral, Q MON, WED & FRI, Polliard, [place] Fuse, NP, 0.0625 mg at 12/20/19 2232   levETIRAcetam (KEPPRA) oral solution 250 mg, 250 mg, Oral, Q12H, Polliard, Haley Ripple T, NP, 250 mg at 12/21/19 2036   pantoprazole (PROTONIX) tablet 40 mg, 40 mg, Oral, ACB, Polliard, Haley Ripple T, NP, 40 mg at 12/22/19 6414   allopurinol (ZYLOPRIM) tablet 100 mg, 100 mg, Oral, DAILY, Polliard, Haley Ripple T, NP, 100 mg at 12/22/19 0843 
  arformoterol (BROVANA) neb solution 15 mcg, 15 mcg, Nebulization, BID RT, 15 mcg at 12/22/19 0823 **AND** budesonide (PULMICORT) 500 mcg/2 ml nebulizer suspension, 500 mcg, Nebulization, BID RT, Polliard, Haley Ripple T, NP, 500 mcg at 12/22/19 2942 
  artificial saliva (MOUTH KOTE) 1 Spray, 1 Spray, Oral, PRN, Nicky Herrera Fuse, NP, 1 Spray at 12/12/19 1452   lactobac ac& pc-s.therm-b.anim (TIAN Q/RISAQUAD), 1 Cap, Oral, DAILY, Elier Brown MD, 1 Cap at 12/22/19 1735   oxyCODONE IR (ROXICODONE) tablet 5 mg, 5 mg, Oral, Q6H PRN, Kurt Harris MD, 5 mg at 12/22/19 0229 
  balsam peru-castor oil (VENELEX) ointment, , Topical, TID, Ronen Andrews MD 
  [facility] capsule 0.4 mg, 0.4 mg, Oral, DAILY, Logan Kincaid MD, 0.4 mg at 12/22/19 8308   insulin lispro (HUMALOG) injection, , SubCUTAneous, AC&HS, Shana Sims NP, 2 Units at 12/22/19 1139   Warfarin MD/NP dosing, , Other, PRN, Mounika Altman MD 
  epoetin yvonne-epbx (RETACRIT) injection 20,000 Units, 20,000 Units, SubCUTAneous, Q7D, Logan Kincaid MD, 20,000 Units at 12/17/19 0721 
  sodium chloride (NS) flush 5-40 mL, 5-40 mL, IntraVENous, Q8H, Carlos PAULINO MD, 10 mL at 12/22/19 1400   sodium chloride (NS) flush 5-40 mL, 5-40 mL, IntraVENous, PRN, Linda Rios MD, 40 mL at 12/17/19 5396   acetaminophen (TYLENOL) tablet 650 mg, 650 mg, Oral, Q6H PRN, Linda Rios MD, 650 mg at 12/19/19 0721 
  naloxone [facility]) injection 0.4 mg, 0.4 mg, IntraVENous, PRN, Linda Rios MD 
  ondansetron [facility]) injection 4 mg, 4 mg, IntraVENous, Q4H PRN, Linda Rios MD, 4 mg at 12/18/19 1302   bisacodyl (DULCOLAX) tablet 5 mg, 5 mg, Oral, DAILY PRN, Linda Rios MD, 5 mg at 12/21/19 5382   sucralfate (CARAFATE) tablet 1 g, 1 g, Oral, AC&HS, Linda Rios MD, 1 g at 12/22/19 1123 
  influenza vaccine 2019-20 (6 mos+)(PF) (FLUARIX/FLULAVAL/FLUZONE QUAD) injection 0.5 mL, 0.5 mL, IntraMUSCular, PRIOR TO DISCHARGE, Celeste Altman MD 
  hydrALAZINE (APRESOLINE) 20 mg/mL injection 20 mg, 20 mg, IntraVENous, Q6H PRN, Shana Sims NP, 20 mg at 12/10/19 0541 
  dextrose 10 % infusion 125-250 mL, 125-250 mL, IntraVENous, PRN, Ciera Stevens MD, Stopped at 11/18/19 0700 A/P 
  
S/P LVAD - good flows Need for anti-coagulation - coumadin DM - insulin RV dysfunction - milrinone, diuretics  
  
Risk of morbidity and mortality - high Medical decision making - high complexity Signed By: Roman Vazquez MD

## 2019-12-22 NOTE — PROGRESS NOTES
Problem: Falls - Risk of 
Goal: *Absence of Falls Description Document Madelyn Aranda Fall Risk and appropriate interventions in the flowsheet. Note: Fall Risk Interventions: 
Mobility Interventions: Communicate number of staff needed for ambulation/transfer Mentation Interventions: Adequate sleep, hydration, pain control, Door open when patient unattended, Toileting rounds Medication Interventions: Patient to call before getting OOB, Teach patient to arise slowly Elimination Interventions: Call light in reach, Patient to call for help with toileting needs, Stay With Me (per policy), Toilet paper/wipes in reach, Toileting schedule/hourly rounds History of Falls Interventions: Evaluate medications/consider consulting pharmacy, Room close to nurse's station

## 2019-12-23 NOTE — PROGRESS NOTES
Problem: Mobility Impaired (Adult and Pediatric) Goal: *Acute Goals and Plan of Care (Insert Text) Description FUNCTIONAL STATUS PRIOR TO ADMISSION: Patient was modified independent using a single point cane for functional mobility. Patient reports an increasingly sedentary lifestyle 2* fatigue and SOB/dyspnea. Retired (so is his wife). Patient reports x 3 falls within the last couple of weeks. Patient is wearing either nasal cannula or CPAP at night. LVAD work-up has been initiated. HOME SUPPORT PRIOR TO ADMISSION: The patient lived with his wife, but did not require physical assistance. Physical Therapy Goals: 
 
Weekly reassessment completed 12/19/2019 and goals downgraded as appropriate. 1.  Patient will move from supine to sit and sit to supine, scoot up and down and roll side to side in bed with contact guard assistance within 7 days. 2.  Patient will perform sit to/from stand with minimal assistance x 1 within 7 days. 3.  Patient will ambulate 25 feet with least restrictive assistive device and moderate assistance within 7 days. 4.  Stair goal to be formulated when appropriate. 5.  Patient will perform cardiac exercises per protocol with supervision within 7 days. 6.  Patient will verbally and functionally recall mindful movement principles (Move in the Tube) with minimal verbal cuing/reminding within 7 days. 7.  Patient will perform power exchange for power module to/from battery with moderate assistance within 7 days. Re-evaluation completed 11/20/2019 and new goals formulated following LVAD implantation. Reviewed and cont 12/3/2019. Reviewed and continued 12/12/2019 1. Patient will move from supine to sit and sit to supine, scoot up and down and roll side to side in bed with minimal assistance/contact guard assist within 7 days. 2.  Patient will perform sit to/from stand with minimal assistance/contact guard assist within 7 days. 3.  Patient will ambulate 150 feet with least restrictive assistive device and minimal assistance/contact guard assist within 7 days. 4.  Patient will ascend/descend 4 stairs with handrail(s) with minimal assistance/contact guard assist within 7 days. 5.  Patient will perform cardiac exercises per protocol with supervision within 7 days. 6.  Patient will verbally and functionally recall 3/3 sternal precautions within 7 days. 7.  Patient will perform power exchange for power module to/from battery with moderate assistance  within 7 days. Initiated 10/27/2019; updated 11/15/2019 1. Patient will move from supine to sit and sit to supine, scoot up and down and roll side to side in bed with independence within 7 days. 2.  Patient will perform sit to/from stand with supervision/set-up within 7 days. 3.  Patient will ambulate 200 feet with least restrictive assistive device and supervision/set-up within 7 days. 4.  Patient will ascend/descend 4 stairs with  handrail(s) with supervision/set-up within 7 days for functional strengthening and community reintegration. Devika Brooks 5.  Patient will verbally and functionally recall 3/3 sternal precautions within 7 days in preparation for LVAD implantation. 6.  Patient will perform a mock power exchange for power module to/from battery with supervision/set-up within 7 days in preparation for LVAD implantation. 1.  Patient will move from supine to sit and sit to supine, scoot up and down and roll side to side in bed with independence within 7 days. 2.  Patient will perform sit to/from stand with supervision/set-up within 7 days. 3.  Patient will ambulate 150 feet with least restrictive assistive device and supervision/set-up within 7 days. 4.  Patient will ascend/descend 4 stairs with  handrail(s) with supervision/set-up within 7 days for functional strengthening and community reintegration. Devika Brooks 5.  Patient will verbally and functionally recall 3/3 sternal precautions within 7 days in preparation for LVAD implantation. 6.  Patient will perform a mock power exchange for power module to/from battery with supervision/set-up within 7 days in preparation for LVAD implantation. Outcome: Progressing Towards Goal 
  
PHYSICAL THERAPY TREATMENT Patient: Vicente Gordillo (75 y.o. male) Date: 12/23/2019 Diagnosis: Acute decompensated heart failure ([facility] [address] [I50.9] <principal problem not specified> Procedure(s) (LRB): LEFT BRACHIAL THROMBECTOMY (Left) 28 Days Post-Op Precautions: Fall, Sternal(LVAD ) Chart, physical therapy assessment, plan of care and goals were reviewed. ASSESSMENT Patient continues with skilled PT services and is minimally progressing towards goals - limited by lethargy, poor motor planning (ataxia), limited endurance, dyspnea (99% on room air - donned 2 LPM for activity), frequent need for bowel hygiene, and new left lateral trunk lean. Patient continues to require moderate assist x 2 for stand pivot transfers 2* unsteadiness (frequently reaching out for supportive surfaces and or therapist). Patient able to maintain alertness for 30 sec to 1 minute with maximal multi-modal stimulation. Continue to highly encourage discharge to rehab once medically stable. Current Level of Function Impacting Discharge (mobility/balance): Mod A x 2 for stand pivot transfers Other factors to consider for discharge: Complex hospital stay, LVAD, significantly below his functional baseline PLAN : 
Patient continues to benefit from skilled intervention to address the above impairments. Continue treatment per established plan of care. to address goals. Recommendation for discharge: (in order for the patient to meet his/her long term goals) Therapy 3 hours per day 5-7 days per week - working towards This discharge recommendation: 
Has been made in collaboration with the attending provider and/or case management IF patient discharges home will need the following DME: to be determined (TBD) SUBJECTIVE:  
Patient stated I need to poop.  OBJECTIVE DATA SUMMARY:  
Critical Behavior: 
Neurologic State: Alert Orientation Level: Oriented X4 Cognition: Appropriate for age attention/concentration Safety/Judgement: Decreased insight into deficits, Decreased awareness of need for assistance, Decreased awareness of need for safety Functional Mobility Training: 
Bed Mobility: 
  
Supine to Sit: Contact guard assistance Sit to Supine: Minimum assistance Transfers: 
Sit to Stand: Moderate assistance;Assist x2; Additional time Stand to Sit: Moderate assistance;Assist x2; Additional time Balance: 
Sitting: Impaired; Without support Sitting - Static: Fair (occasional) Sitting - Dynamic: Fair (occasional) Standing: Impaired; Without support Standing - Static: Fair;Constant support Standing - Dynamic : Poor;Constant support Activity Tolerance:  
Poor, desaturates with exertion and requires oxygen, requires frequent rest breaks, and observed SOB with activity Please refer to the flowsheet for vital signs taken during this treatment. After treatment patient left in no apparent distress:  
Supine in bed, Call bell within reach, and Side rails x 3 
 
COMMUNICATION/COLLABORATION:  
The patients plan of care was discussed with: Occupational Therapist, Registered Nurse, Physician, and  Equilla Sacks, PT, DPT Time Calculation: 41 mins

## 2019-12-23 NOTE — PROGRESS NOTES
[facility] VAD/Heart Failure Progress Note Admit Date: 10/25/2019 POD:  28 Days Post-Op Procedure:  Procedure(s): LEFT BRACHIAL THROMBECTOMY Subjective:  
Mild dyspnea, fatigue, and weakness; working on hematuria Objective:  
Vitals: 
Blood pressure 91/72, pulse 74, temperature 97.7 °F (36.5 °C), resp. rate 18, height 6' 2\" (1.88 m), weight 166 lb 14.2 oz (75.7 kg), SpO2 100 %. Temp (24hrs), Av.7 °F (36.5 °C), Min:97.4 °F (36.3 °C), Max:97.9 °F (36.6 °C) Hemodynamics: 
 CO: CO (l/min): 5.8 l/min CI: CI (l/min/m2): 2.8 l/min/m2 CVP: CVP (mmHg): 4 mmHg (19 1645) SVR: SVR (dyne*sec)/cm5: 1080 (dyne*sec)/cm5 (19 1200) PAP Systolic: PAP Systolic: 34 (31/65/96 9561) PAP Diastolic: PAP Diastolic: 13 (29/46/32 6780) PVR:   
 SV02: SVO2 (%): 67 % (19 1300) SCV02: SCVO2 (%): 75 % (10/29/19 1900) VAD Interrogation: LVAD (Heartmate) Pump Speed (RPM): 6400 Pump Flow (LPM): 5.8 PI (Pulsitility Index): 2.8 Power: 5.6 MAP: 90  Test: No 
Back Up  at Bedside & Labeled: Yes Power Module Test: No 
Driveline Site Care: No 
Driveline Dressing: Clean, Dry, and Intact EKG/Rhythm:   
 
Extubation Date / Time:  
 
CT Output:  
 
Ventilator: 
Ventilator Volumes Vt Set (ml): 550 ml (19 08) Vt Exhaled (Machine Breath) (ml): 639 ml (19 08) Vt Spont (ml): 437 ml (19 1035) Ve Observed (l/min): 7.25 l/min (19 1035) Oxygen Therapy: 
Oxygen Therapy O2 Sat (%): 100 % (19 1525) Pulse via Oximetry: 78 beats per minute (19 1021) O2 Device: BIPAP(while sleeping) (19 1030) O2 Flow Rate (L/min): 2 l/min (19 1021) FIO2 (%): 21 % (19 0823) CXR: 
 
Admission Weight: Last Weight Weight: 192 lb 10.9 oz (87.4 kg) Weight: 166 lb 14.2 oz (75.7 kg) Intake / Output / Drain: 
Current Shift: 701 -  190 In: 65615.7 [P.O.:240; I.V.:260.7] Out: 1271 Last 24 hrs.:  
 
Intake/Output Summary (Last 24 hours) at 12/23/2019 1537 Last data filed at 12/23/2019 1450 Gross per 24 hour Intake 49820.59 ml Output 87476 ml Net 5620.59 ml No results for input(s): CPK, CKMB, TROIQ in the last 72 hours. Recent Labs  
  12/23/19 
0247 12/22/19 
1219 12/22/19 
0405  12/21/19 
0016 *  --  134*  --  131*  
K 3.9  --  3.5  --  3.7 CO2 36*  --  37*  --  38* BUN 33*  --  32*  --  35* CREA 1.79*  --  1.68*  --  1.79* *  --  104*  --  113* MG 2.0  --  2.0  --  1.8 WBC 4.9  --  4.7  --  4.6 HGB 7.8* 8.2* 7.7*   < > 7.2* HCT 25.2* 26.2* 25.0*   < > 22.9*  
  --  178  --  164  
 < > = values in this interval not displayed. Recent Labs  
  12/23/19 
0247 12/22/19 
0405 12/21/19 
0016 INR 1.5* 1.2* 1.2* PTP 15.3* 12.5* 11.9* APTT 31.3 29.6 29.3 No lab exists for component: PBNP Current Facility-Administered Medications:  
  dronabinol (MARINOL) capsule 2.5 mg, 2.5 mg, Oral, DAILY, Sabra Herrera, NP 
  levETIRAcetam (KEPPRA) tablet 250 mg, 250 mg, Oral, BID, Basilia Herrera Merlos T NP, 250 mg at 12/23/19 0797   senna-docusate (PERICOLACE) 8.6-50 mg per tablet 1 Tab, 1 Tab, Oral, BID, Sabra Herrera NP, 1 Tab at 12/23/19 4724   polyethylene glycol (MIRALAX) packet 17 g, 17 g, Oral, DAILY, Basilia Herrera Merlos T, NP, 17 g at 12/23/19 3198   albuterol-ipratropium (DUO-NEB) 2.5 MG-0.5 MG/3 ML, 3 mL, Nebulization, Q6H PRN, Haim Andrews MD 
  therapeutic multivitamin [facility]) tablet 1 Tab, 1 Tab, Oral, DAILY, Levi, Shana B, NP, 1 Tab at 12/23/19 2363   escitalopram oxalate (LEXAPRO) tablet 10 mg, 10 mg, Oral, QPM, Mounika Altman MD, 10 mg at 12/22/19 1823   potassium chloride SR (KLOR-CON 10) tablet 40 mEq, 40 mEq, Oral, DAILY, Levi Shana B, NP, 40 mEq at 12/23/19 4083   alteplase (CATHFLO) 1 mg in sterile water (preservative free) 1 mL injection, 1 mg, InterCATHeter, PRN, Mounika Carpenter MD, 1 mg at 12/18/19 2355   finasteride (PROSCAR) tablet 5 mg, 5 mg, Oral, DAILY, Juventino Rodrigez MD, 5 mg at 12/23/19 8130   spironolactone (ALDACTONE) tablet 25 mg, 25 mg, Oral, DAILY, Mounika Altman MD, 25 mg at 12/23/19 8407   clonazePAM (KlonoPIN) tablet 0.5 mg, 0.5 mg, Oral, TID PRN, Mounika Altman MD, 0.5 mg at 12/23/19 0700 
  milrinone (PRIMACOR) 20 MG/100 ML D5W infusion, 0.2 mcg/kg/min, IntraVENous, CONTINUOUS, Shana Sims NP, Last Rate: 5 mL/hr at 12/23/19 0853, 0.2 mcg/kg/min at 12/23/19 0327   sildenafil (pulm.hypertension) (REVATIO) tablet 40 mg, 40 mg, Oral, TID, Mounika Altman MD, Stopped at 12/23/19 0900 
  magnesium oxide (MAG-OX) tablet 400 mg, 400 mg, Oral, BID, Polliard, Sondrals Pal, NP, 400 mg at 12/23/19 4506   diphenhydrAMINE (BENADRYL) capsule 25 mg, 25 mg, Oral, QHS PRN, Pollliborio, Sondrals Pal, NP 
  octreotide (SANDOSTATIN) injection 25 mcg, 25 mcg, SubCUTAneous, TID, Polliard, Ulysess Ulises T, NP, 25 mcg at 12/23/19 1146 
  benzocaine-menthol (CEPACOL) lozenge 1 Lozenge, 1 Lozenge, Mucous Membrane, PRN, Polliard, Charls Pal, NP 
  digoxin (LANOXIN) tablet 0.0625 mg, 62.5 mcg, Oral, Q MON, WED & FRI, Polliard, Charls Pal, NP, 0.0625 mg at 12/20/19 2232   pantoprazole (PROTONIX) tablet 40 mg, 40 mg, Oral, ACB, Polliard, Ulysess Ulisse T, NP, 40 mg at 12/23/19 0700 
  allopurinol (ZYLOPRIM) tablet 100 mg, 100 mg, Oral, DAILY, Spenser Herrera NP, 100 mg at 12/23/19 0914 
  arformoterol (BROVANA) neb solution 15 mcg, 15 mcg, Nebulization, BID RT, 15 mcg at 12/23/19 1021 **AND** budesonide (PULMICORT) 500 mcg/2 ml nebulizer suspension, 500 mcg, Nebulization, BID RT, Spenser Herrera NP, 500 mcg at 12/23/19 1021 
  artificial saliva (MOUTH KOTE) 1 Spray, 1 Spray, Oral, PRN, Vicente Herrera NP, 1 Spray at 12/12/19 1452   lactobac ac& pc-s.therm-b.anim (TIAN Q/RISAQUAD), 1 Cap, Oral, DAILY, Ector Hernandez MD, 1 Cap at 12/23/19 0915 
  oxyCODONE IR (ROXICODONE) tablet 5 mg, 5 mg, Oral, Q6H PRN, Raul Norris MD, 5 mg at 12/22/19 0229 
  balsam peru-castor oil (VENELEX) ointment, , Topical, TID, Juanita Andrews MD 
  [facility]) capsule 0.4 mg, 0.4 mg, Oral, DAILY, Logan Kincaid MD, 0.4 mg at 12/23/19 8044   insulin lispro (HUMALOG) injection, , SubCUTAneous, AC&HS, Shana Sims NP, 2 Units at 12/23/19 1323   Warfarin MD/NP dosing, , Other, PRN, Mounika Altman MD 
  epoetin yvonne-epbx (RETACRIT) injection 20,000 Units, 20,000 Units, SubCUTAneous, Q7D, Logan Kincaid MD, 20,000 Units at 12/17/19 0721 
  sodium chloride (NS) flush 5-40 mL, 5-40 mL, IntraVENous, Q8H, Harshal Domínguez MD, 10 mL at 12/23/19 0700 
  sodium chloride (NS) flush 5-40 mL, 5-40 mL, IntraVENous, PRN, Raul Norris MD, 40 mL at 12/17/19 6738   acetaminophen (TYLENOL) tablet 650 mg, 650 mg, Oral, Q6H PRN, Raul Norris MD, 650 mg at 12/19/19 0721 
  naloxone [facility]) injection 0.4 mg, 0.4 mg, IntraVENous, PRN, Raul Norris MD 
  ondansetron [facility]) injection 4 mg, 4 mg, IntraVENous, Q4H PRN, Raul Norris MD, 4 mg at 12/18/19 1302   bisacodyl (DULCOLAX) tablet 5 mg, 5 mg, Oral, DAILY PRN, Raul Norris MD, 5 mg at 12/22/19 1533 
  sucralfate (CARAFATE) tablet 1 g, 1 g, Oral, AC&HS, Raul Norris MD, 1 g at 12/23/19 1323   influenza vaccine 2019-20 (6 mos+)(PF) (FLUARIX/FLULAVAL/FLUZONE QUAD) injection 0.5 mL, 0.5 mL, IntraMUSCular, PRIOR TO DISCHARGE, Mounika Altman MD 
  hydrALAZINE (APRESOLINE) 20 mg/mL injection 20 mg, 20 mg, IntraVENous, Q6H PRN, Shana Sims, NP, 20 mg at 12/10/19 0541 
  dextrose 10 % infusion 125-250 mL, 125-250 mL, IntraVENous, PRN, Royal Jennifer Kamara MD, Stopped at 11/18/19 0700 
 
  
A/P 
  
S/P LVAD - good flows Need for anti-coagulation - coumadin DM - insulin RV dysfunction - milrinone, diuretics  
  
 Risk of morbidity and mortality - high Medical decision making - high complexity Signed By: Bryant Jackson MD

## 2019-12-23 NOTE — PROGRESS NOTES
0730 Bedside shift change report given to [place] KATRIN Carbone  (oncoming nurse) by Jasmina Hernandez RN (offgoing nurse). Report included the following information SBAR, Kardex, Procedure Summary, Intake/Output, MAR, Recent Results and Med Rec Status. Patient has had over 100 PI events since 0400 
 
0900 Patient's MAP 56, notified Patrick Macias NP and updated on patients status. Give 250mL Albumin. Patient had 40% breakfast and drank ensure. 0930 Map 60, placed 2L of O2 via NC 
 
0955 Map 76 
 
1000 Patient used bedpan, had small BM  
 
1030 Dr. Ebonie Rooney at bedside, place on patients bipap 
 
1100 Wife at bedside feeding patient. Patient ate 40% of meal and drank ensure. 1430 Patient up to bedside commode with PT, had another BM  
 
1500 Flushed continuous bladder irrigation neal with 180mL, a few clots were released. Increased irrigation until clear.

## 2019-12-23 NOTE — PROGRESS NOTES
Urology Progress Note Patient: Otis Dyson MRN: [id number]  SSN: [id number] YOB: 1950  Age: 71 y.o. Sex: male ADMITTED: 10/25/2019 to [place] MD for Acute decompens[facility] [I50.9] POD# 28 Days Post-Op Procedure(s): LEFT BRACHIAL THROMBECTOMY Hematuria seems to be improving. Intermittently pink but improves with CBI. Urine clear currently with moderate CBI. Vitals: Temp (24hrs), Av.6 °F (36.4 °C), Min:97.5 °F (36.4 °C), Max:97.9 °F (36.6 °C) Blood pressure 91/72, pulse 73, temperature 97.6 °F (36.4 °C), resp. rate 20, height 6' 2\" (1.88 m), weight 75.7 kg (166 lb 14.2 oz), SpO2 95 %. Intake and Output: 
 1901 -  0700 In: 28139.1 [P.O.:1080; I.V.:145.1] Out: 39568 Abdomen soft. Labs: 
Labs:  
Lab Results Component Value Date/Time WBC 4.9 2019 02:47 AM  
 HGB 7.8 (L) 2019 02:47 AM  
 Creatinine 1.79 (H) 2019 02:47 AM  
 
 
 
Assessment/Plan: 
Continue to wean CBI. Hand irrigate if needed. Signed By: Dakota Mancera MD - 2019

## 2019-12-23 NOTE — PROGRESS NOTES
[facility] 
 [address] [address], [facility] [address] [facility] Phone: [phone]   [phone]   
  
Nephrology Progress Note Sona Kiser     1950     [id number] Date of Admission : 10/25/2019 
12/23/19 CC:  Follow up for JUAN J, CKD, Hyponatremia Assessment and Plan JUAN J on CKD: 
- hold diuretics  
- agree w/ IV albumin Hyponatremia : 
- 2/2 CHF vs fluid overload / water retention from CBI  
- improved. Na at 133 today Gross hematuria, BPH w/ retention: 
- off CBI pre VAD. cysto pre op showed catheter related Cystitis @ postr wall  
- CBI for recurrent hematuria  
- ? Needs Bladder Fulguration Hypokalemia  
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  
- unable to get CTA/MRA 
- On Keppra Acute on Chronic HFrEF  
- EF 16-20%, NYHA Class IV , hx of VF arrest - s/p AICD 
- Impella insertion 10/29- removed 11/18  
- s/p LVAD placement on 11/18 
- s/p right thoracentesis. CT in place Hoarseness, vocal cord paralysis, mediastinal adenopathy: 
- will need eventual PET/CT 
  
L arm clot s/p thrombectomy on 11/25 JOSE on CPAP  
  
PAF s/p DCCV 6/19 
  
Anemia: 
- from blood loss s/p multiple blood products - s/p IV iron,  Repeat iron studies ok 
- on PAVEL q7 d Serratia and Enteroccocus UTI: 
- completed abx Interval History:   
Seen and examined Events noted Near syncope Denies any N/V/CP/SOB Review of Systems: as per HPI Current Medications:  
Current Facility-Administered Medications Medication Dose Route Frequency  albumin human 5% (BUMINATE) 5 % solution  dronabinol (MARINOL) capsule 2.5 mg  2.5 mg Oral DAILY  levETIRAcetam (KEPPRA) tablet 250 mg  250 mg Oral BID  senna-docusate (PERICOLACE) 8.6-50 mg per tablet 1 Tab  1 Tab Oral BID  polyethylene glycol (MIRALAX) packet 17 g  17 g Oral DAILY  albuterol-ipratropium (DUO-NEB) 2.5 MG-0.5 MG/3 ML  3 mL Nebulization Q6H PRN  
  therapeutic multivitamin (THERAGRAN) tablet 1 Tab  1 Tab Oral DAILY  escitalopram oxalate (LEXAPRO) tablet 10 mg  10 mg Oral QPM  
 potassium chloride SR (KLOR-CON 10) tablet 40 mEq  40 mEq Oral DAILY  alteplase (CATHFLO) 1 mg in sterile water (preservative free) 1 mL injection  1 mg InterCATHeter PRN  finasteride (PROSCAR) tablet 5 mg  5 mg Oral DAILY  spironolactone (ALDACTONE) tablet 25 mg  25 mg Oral DAILY  clonazePAM (KlonoPIN) tablet 0.5 mg  0.5 mg Oral TID PRN  
 milrinone (PRIMACOR) 20 MG/100 ML D5W infusion  0.2 mcg/kg/min IntraVENous CONTINUOUS  
 sildenafil (pulm.hypertension) (REVATIO) tablet 40 mg  40 mg Oral TID  magnesium oxide (MAG-OX) tablet 400 mg  400 mg Oral BID  diphenhydrAMINE (BENADRYL) capsule 25 mg  25 mg Oral QHS PRN  
 octreotide (SANDOSTATIN) injection 25 mcg  25 mcg SubCUTAneous TID  benzocaine-menthol (CEPACOL) lozenge 1 Lozenge  1 Lozenge Mucous Membrane PRN  
 digoxin (LANOXIN) tablet 0.0625 mg  62.5 mcg Oral Q MON, WED & FRI  pantoprazole (PROTONIX) tablet 40 mg  40 mg Oral ACB  allopurinol (ZYLOPRIM) tablet 100 mg  100 mg Oral DAILY  arformoterol (BROVANA) neb solution 15 mcg  15 mcg Nebulization BID RT And  
 budesonide (PULMICORT) 500 mcg/2 ml nebulizer suspension  500 mcg Nebulization BID RT  
 artificial saliva (MOUTH KOTE) 1 Spray  1 Spray Oral PRN  
 lactobac ac& pc-s.therm-b.anim (TIAN Q/RISAQUAD)  1 Cap Oral DAILY  oxyCODONE IR (ROXICODONE) tablet 5 mg  5 mg Oral Q6H PRN  
 balsam peru-castor oil (VENELEX) ointment   Topical TID  tamsulosin (FLOMAX) capsule 0.4 mg  0.4 mg Oral DAILY  insulin lispro (HUMALOG) injection   SubCUTAneous AC&HS  Warfarin MD/NP dosing   Other PRN  
 epoetin yvonne-epbx (RETACRIT) injection 20,000 Units  20,000 Units SubCUTAneous Q7D  
 sodium chloride (NS) flush 5-40 mL  5-40 mL IntraVENous Q8H  
 sodium chloride (NS) flush 5-40 mL  5-40 mL IntraVENous PRN  
  acetaminophen (TYLENOL) tablet 650 mg  650 mg Oral Q6H PRN  
 naloxone (NARCAN) injection 0.4 mg  0.4 mg IntraVENous PRN  
 ondansetron (ZOFRAN) injection 4 mg  4 mg IntraVENous Q4H PRN  
 bisacodyl (DULCOLAX) tablet 5 mg  5 mg Oral DAILY PRN  
 sucralfate (CARAFATE) tablet 1 g  1 g Oral AC&HS  influenza vaccine 2019-20 (6 mos+)(PF) (FLUARIX/FLULAVAL/FLUZONE QUAD) injection 0.5 mL  0.5 mL IntraMUSCular PRIOR TO DISCHARGE  hydrALAZINE (APRESOLINE) 20 mg/mL injection 20 mg  20 mg IntraVENous Q6H PRN  
 dextrose 10 % infusion 125-250 mL  125-250 mL IntraVENous PRN No Known Allergies Objective: 
Vitals:   
Vitals:  
 12/23/19 0307 12/23/19 0354 12/23/19 0731 12/23/19 1021 BP:      
Pulse: 79  73 Resp: 20  20 Temp:   97.6 °F (36.4 °C) SpO2:   95% 95% Weight:  75.7 kg (166 lb 14.2 oz) Height:      
 
Intake and Output: 
12/23 0701 - 12/23 1900 In: 9000 Out: 4200  
12/21 1901 - 12/23 0700 In: 48420 [P.O.:1080; I.V.:180] Out: 94799 Physical Examination: 
 
General: Elderly man in no acute distress HEENT:           P[place] Neck:  No lines Resp:  Rales + CV:  VAD sounds; No LE edema GI:  Soft and non-tender; no distension Neurologic:  Alert and appropriate; normal speech :  + neal; Hematuria- CLEARING [x]    High complexity decision making was performed 
[x]    Patient is at high-risk of decompensation with multiple organ involvement Lab Data Personally Reviewed: I have reviewed all the pertinent labs, microbiology data and radiology studies during assessment. Recent Labs  
  12/23/19 
0247 12/22/19 
0405 12/21/19 
0016 * 134* 131*  
K 3.9 3.5 3.7 CL 93* 94* 92* CO2 36* 37* 38* * 104* 113* BUN 33* 32* 35* CREA 1.79* 1.68* 1.79* CA 9.1 9.1 8.9 MG 2.0 2.0 1.8 ALB 2.5* 2.5* 2.4* SGOT 26 25 23 ALT 18 16 13 INR 1.5* 1.2* 1.2* Recent Labs  
  12/23/19 
0247 12/22/19 
1219 12/22/19 
0405 12/21/19 
2150 12/21/19 3873 12/21/19 
0016 WBC 4.9  --  4.7  --   --  4.6 HGB 7.8* 8.2* 7.7* 7.8* 7.6* 7.2* HCT 25.2* 26.2* 25.0* 24.8* 23.9* 22.9*  
  --  178  --   --  164 No results found for: SDES Lab Results Component Value Date/Time Culture result: NO GROWTH 5 DAYS 12/15/2019 03:37 PM  
 Culture result: NO GROWTH 1 DAY 12/15/2019 03:30 PM  
 Culture result: NO GROWTH 5 DAYS 12/06/2019 11:23 PM  
 Culture result: HEAVY ENTEROCOCCUS SPECIES NOTED (A) 11/27/2019 11:10 AM  
 Culture result: LIGHT YEAST (A) 11/27/2019 11:10 AM  
 
Recent Results (from the past 24 hour(s)) HGB & HCT Collection Time: 12/22/19 12:19 PM  
Result Value Ref Range HGB 8.2 (L) 12.1 - 17.0 g/dL HCT 26.2 (L) 36.6 - 50.3 % GLUCOSE, POC Collection Time: 12/22/19  4:11 PM  
Result Value Ref Range Glucose (POC) 133 (H) 65 - 100 mg/dL Performed by Patrice Parra EKG, 12 LEAD, INITIAL Collection Time: 12/22/19  6:48 PM  
Result Value Ref Range Ventricular Rate 78 BPM  
 Atrial Rate 55 BPM  
 QRS Duration 98 ms Q-T Interval 420 ms QTC Calculation (Bezet) 478 ms Calculated P Axis -20 degrees Calculated R Axis 148 degrees Calculated T Axis 155 degrees Diagnosis Electronic ventricular pacemaker When compared with ECG of 08-DEC-2019 10:55, 
Previous ECG has undetermined rhythm, needs review GLUCOSE, POC Collection Time: 12/22/19  9:08 PM  
Result Value Ref Range Glucose (POC) 118 (H) 65 - 100 mg/dL Performed by Patrice Parra PREALBUMIN Collection Time: 12/23/19  2:44 AM  
Result Value Ref Range Prealbumin 20.1 20.0 - 40.0 mg/dL METABOLIC PANEL, COMPREHENSIVE Collection Time: 12/23/19  2:47 AM  
Result Value Ref Range Sodium 133 (L) 136 - 145 mmol/L Potassium 3.9 3.5 - 5.1 mmol/L Chloride 93 (L) 97 - 108 mmol/L  
 CO2 36 (H) 21 - 32 mmol/L Anion gap 4 (L) 5 - 15 mmol/L Glucose 113 (H) 65 - 100 mg/dL  BUN 33 (H) 6 - 20 MG/DL  
 Creatinine 1.79 (H) 0.70 - 1.30 MG/DL  
 BUN/Creatinine ratio 18 12 - 20 GFR est AA 46 (L) >60 ml/min/1.73m2 GFR est non-AA 38 (L) >60 ml/min/1.73m2 Calcium 9.1 8.5 - 10.1 MG/DL Bilirubin, total 0.7 0.2 - 1.0 MG/DL  
 ALT (SGPT) 18 12 - 78 U/L  
 AST (SGOT) 26 15 - 37 U/L Alk. phosphatase 122 (H) 45 - 117 U/L Protein, total 7.0 6.4 - 8.2 g/dL Albumin 2.5 (L) 3.5 - 5.0 g/dL Globulin 4.5 (H) 2.0 - 4.0 g/dL A-G Ratio 0.6 (L) 1.1 - 2.2 MAGNESIUM Collection Time: 12/23/19  2:47 AM  
Result Value Ref Range Magnesium 2.0 1.6 - 2.4 mg/dL CBC W/O DIFF Collection Time: 12/23/19  2:47 AM  
Result Value Ref Range WBC 4.9 4.1 - 11.1 K/uL  
 RBC 2.70 (L) 4.10 - 5.70 M/uL HGB 7.8 (L) 12.1 - 17.0 g/dL HCT 25.2 (L) 36.6 - 50.3 % MCV 93.3 80.0 - 99.0 FL  
 MCH 28.9 26.0 - 34.0 PG  
 MCHC 31.0 30.0 - 36.5 g/dL  
 RDW 17.2 (H) 11.5 - 14.5 % PLATELET 983 691 - 074 K/uL MPV 9.1 8.9 - 12.9 FL  
 NRBC 0.0 0  WBC ABSOLUTE NRBC 0.00 0.00 - 0.01 K/uL PROTHROMBIN TIME + INR Collection Time: 12/23/19  2:47 AM  
Result Value Ref Range INR 1.5 (H) 0.9 - 1.1 Prothrombin time 15.3 (H) 9.0 - 11.1 sec PTT Collection Time: 12/23/19  2:47 AM  
Result Value Ref Range aPTT 31.3 22.1 - 32.0 sec  
 aPTT, therapeutic range     58.0 - 77.0 SECS  
DIGOXIN Collection Time: 12/23/19  2:47 AM  
Result Value Ref Range Digoxin level 0.8 (L) 0.90 - 2.00 NG/ML  
LACTIC ACID Collection Time: 12/23/19  2:47 AM  
Result Value Ref Range Lactic acid 0.9 0.4 - 2.0 MMOL/L  
PROCALCITONIN Collection Time: 12/23/19  2:47 AM  
Result Value Ref Range Procalcitonin 0.06 ng/mL LD Collection Time: 12/23/19  2:47 AM  
Result Value Ref Range  85 - 241 U/L  
NT-PRO BNP Collection Time: 12/23/19  2:47 AM  
Result Value Ref Range NT pro-BNP 1,787 (H) <125 PG/ML  
GLUCOSE, POC Collection Time: 12/23/19  6:26 AM  
Result Value Ref Range Glucose (POC) 141 (H) 65 - 100 mg/dL Performed by Chayo Willson (CHAU) Bipin Bautista MD

## 2019-12-23 NOTE — PROGRESS NOTES
Problem: Self Care Deficits Care Plan (Adult) Goal: *Acute Goals and Plan of Care (Insert Text) Description FUNCTIONAL STATUS PRIOR TO ADMISSION: Patient was modified independent using a single point cane for functional mobility. Patient able to shower and dress himself. However, patient required assistance for household management from his wife. HOME SUPPORT: The patient lived alone with wife to provide assistance. Occupational Therapy Goals: 
 
Goals reviewed and continued 12/19/2019 OT weekly reassessment 12/6/19: goals modified 1. Patient will perform upper body dressing moderate assistance within 7 day(s). 2.  Patient will perform lower body dressing with moderate assistance within 7 day(s). 3.  Patient will perform grooming seated EOB with minimum assistance within 7 day(s). 4.  Patient will perform toilet transfers with minimum assistance within 7 day(s). 5.  Patient will perform all aspects of toileting with moderate assistance within 7 day(s). 6.  Patient will participate in upper extremity therapeutic exercise/activities with supervision/set-up-min A for 5 minutes within 7 day(s). 7.  Patient will utilize energy conservation techniques during functional activities with verbal cues within 7 day(s). 8. Patient will verbalize 3/5 LVAD components with min verbal cues within 7 day (s). OT weekly reassessment 11/29/19: goals modified below 1. Patient will perform upper body dressing including LVAD switchovers with moderate assistance within 7 day(s). 2.  Patient will perform lower body dressing with minimal assistance within 7 day(s). 3.  Patient will perform grooming in standing with minimum assistance within 7 day(s). 4.  Patient will perform toilet transfers with minimum assistance within 7 day(s). 5.  Patient will perform all aspects of toileting with minimal assistance within 7 day(s).  
6.  Patient will participate in upper extremity therapeutic exercise/activities with supervision/set-up for 5 minutes within 7 day(s). 7.  Patient will utilize energy conservation techniques during functional activities with verbal cues within 7 day(s). 8. Patient will verbalize LVAD terminology with verbal cues within 7 day (s). Goals reviewed and continued/modified as follows 11/20/19 s/p LVAD implantation 1. Patient will perform upper body dressing including LVAD switchovers with moderate assistance within 7 day(s). 2.  Patient will perform lower body dressing with minimal assistance within 7 day(s). 3.  Patient will perform grooming with supervision/setup within 7 day(s). 4.  Patient will perform toilet transfers supervision/setupmodified independence within 7 day(s). 5.  Patient will perform all aspects of toileting with minimal assistance within 7 day(s). 6.  Patient will participate in upper extremity therapeutic exercise/activities with supervision/set-up for 5 minutes within 7 day(s). 7.  Patient will utilize energy conservation techniques during functional activities with verbal cues within 7 day(s). 8. Patient will verbalize LVAD terminology with verbal cues within 7 day (s). Goals reviewed and continued/modified as follows 11/12/19 1. Patient will perform bathing with supervision/set-up within 7 day(s). 2.  Patient will perform lower body dressing with supervision/set-up within 7 day(s). 3.  Patient will perform grooming with modified independence within 7 day(s). 4.  Patient will perform toilet transfers with modified independence within 7 day(s). 5.  Patient will perform all aspects of toileting with supervision/set-up within 7 day(s). 6.  Patient will participate in upper extremity therapeutic exercise/activities with supervision/set-up for 5 minutes within 7 day(s). 7.  Patient will utilize energy conservation techniques during functional activities with verbal cues within 7 day(s). 8. Patient will verbalize LVAD terminology with verbal cues within 7 day (s) in preparation for implant. Continue all goals 10/30/19 post re-eval for Impella removal  
Initiated 10/28/2019 1. Patient will perform bathing with supervision/set-up within 7 day(s). 2.  Patient will perform lower body dressing with supervision/set-up within 7 day(s). 3.  Patient will perform grooming with modified independence within 7 day(s). 4.  Patient will perform toilet transfers with modified independence within 7 day(s). 5.  Patient will perform all aspects of toileting with supervision/set-up within 7 day(s). 6.  Patient will participate in upper extremity therapeutic exercise/activities with supervision/set-up for 5 minutes within 7 day(s). 7.  Patient will utilize energy conservation techniques during functional activities with verbal cues within 7 day(s). Outcome: Progressing Towards Goal 
 OCCUPATIONAL THERAPY TREATMENT Patient: Montserrat Bryant (75 y.o. male) Date: 12/23/2019 Diagnosis: Acute decompensated heart failure ([facility] [address] [I50.9] <principal problem not specified> Procedure(s) (LRB): LEFT BRACHIAL THROMBECTOMY (Left) 28 Days Post-Op Precautions: Fall, Sternal(LVAD ) Chart, occupational therapy assessment, plan of care, and goals were reviewed. ASSESSMENT Patient continues with skilled OT services and is minimally progressing towards goals at this time secondary to extreme lethargy/decreased level of arousal, decreased attention, impulsivity, impaired balance, impaired coordination (ataxia), generalized weakness, decreased endurance (required 2 L NC O2 with O2 sats 88% on RA) and with L lateral lean today requiring MOD A x 2 for transfer to toilet and MAX A for toileting tasks. Patient has had a bowel movement in the past 3-4 OT sessions and continues with bladder irrigation. Continue to recommend rehab post discharge when patient medically stable and appropriate. Current Level of Function Impacting Discharge (ADLs): MAX A toileting Other factors to consider for discharge: arousal, drowsiness, impulsivity; LVAD HM III (no insight into equipment) PLAN : 
Patient continues to benefit from skilled intervention to address the above impairments. Continue treatment per established plan of care. to address goals. Recommend with staff: OOB x 3 to chair w/ chair alarm; BUE cardiac exercises Recommend next OT session: LB ADLs Recommendation for discharge: (in order for the patient to meet his/her long term goals) Therapy 3 hours per day 5-7 days per week This discharge recommendation: 
Has been made in collaboration with the attending provider and/or case management IF patient discharges home will need the following DME: to be determined (TBD) SUBJECTIVE:  
Patient stated I have got to go to the bathroom.  OBJECTIVE DATA SUMMARY:  
Cognitive/Behavioral Status: 
Neurologic State: Alert Orientation Level: Oriented X4 Cognition: Appropriate for age attention/concentration Perception: Appears intact Perseveration: No perseveration noted Safety/Judgement: Decreased insight into deficits; Decreased awareness of need for assistance;Decreased awareness of need for safety Functional Mobility and Transfers for ADLs: 
Bed Mobility: 
Supine to Sit: Contact guard assistance Sit to Supine: Minimum assistance Transfers: 
Sit to Stand: Moderate assistance;Assist x2; Additional time Functional Transfers Toilet Transfer : Moderate assistance;Assist x2; Additional time(stand pivot transfer using eye patch) Balance: 
Sitting: Impaired; Without support Sitting - Static: Fair (occasional) Sitting - Dynamic: Fair (occasional) Standing: Impaired; Without support Standing - Static: Fair;Constant support Standing - Dynamic : Poor;Constant support ADL Intervention: 
  
 
Grooming Washing Face: Contact guard assistance(seated unsupported EOB) Toileting Toileting Assistance: Maximum assistance Bowel Hygiene: Maximum assistance Cues: Verbal cues provided; Tactile cues provided Adaptive Equipment: (bedside commode) Cognitive Retraining Safety/Judgement: Decreased insight into deficits; Decreased awareness of need for assistance;Decreased awareness of need for safety Activity Tolerance:  
Fair, requires rest breaks, and lethargy Please refer to the flowsheet for vital signs taken during this treatment. After treatment patient left in no apparent distress:  
Supine in bed, Call bell within reach, and Caregiver / family present COMMUNICATION/COLLABORATION:  
The patients plan of care was discussed with: Physical Therapist and Registered Nurse Anita Reilly Time Calculation: 42 mins

## 2019-12-23 NOTE — PROGRESS NOTES
Bedside and Verbal shift change report given to Radha Heath RN(oncoming nurse) by Ariel Shen RN (offgoing nurse). Report included the following information SBAR, Kardex, ED Summary, OR Summary, Procedure Summary, Intake/Output, MAR, Recent Results, Med Rec Status and Cardiac Rhythm Paced vs AFib Sascha Kaiser aware of continuing high frequency of PI events. Notified that Urology did assess at bedisde tis AM and inidicated plan to continue with CBU. Notified of plan to change Keppra level to add -on.

## 2019-12-23 NOTE — PROGRESS NOTES
[address] [facility] In[facility] Progress Note Patient name: Justice Weems Patient : 1950 Patient MRN: [id number] Attending MD: Kendall Govea MD 
Date of service: 19 Chief Complaint:  
Angel Myrick azucena LVAD implant 
  
HPI: Sona Kiser is a 71y.o. year old pleasant white male with a history of HTN, HLD, JOSE, CAD s/p cardiac arrest VFib s/p CABG () c/b sternal would infection and sternectomy, ischemic cardiomyopathy LVEF 15-20%, s/p ICD and with LBBB. He was admitted with acute on chronic systolic heart failure with massive volume overload > 20 lbs, in the setting of atrial fibrillation s/p failed DCCV and underwent DCCV and RHC on .  S/p BiVICD on 19 with Dr. Mark Thomas. He was discharged home home on IV milrinone on 19. Phil Trinidad has been followed closely by Dr. Janie Clark and the [facility]. 
  
Mr. Kiser was admitted for acute on chronic systolic heart failure. He underwent implantation of Impella 5.0 due to CS and has completed his LVAD evaluation. Phil Trinidad meets criteria for implant of HM3 as DT. He was NYHA Class IV prior to implant of Impella 5.0, has LVEF < 15%, was intolerant of GDMT due to symptomatic hypotension and renal dysfunction. He remains dependent on temporary MCS support. RHC  revealed compensated hemodynamics on Impella. His renal function has improved dramatically with improvement in his hemodynamics. He is not a suitable transplant candidate due to single kidney, sternectomy, debility, and frailty. He was reviewed by Nina Lim and felt to be a high risk heart transplant candidate due to multiple co-morbidities as well as the afore mentioned conditions.  He remained in the CCU and underwent a HM 3 implant as DT on . He was weaned off of pressors and transferred to [place] [address] where he continues to undergo PT/OT and hemodynamic optimization.   
  
24Hr Events Hypotensive this AM 
Somnolent Hgb slowly improving INR 1.3 Procedure:  Procedure(s): REMOVAL TEMPORARY LEFT VENTRICULAR ASSIST DEVICE, IMPLANTATION OF LEFT VENTRICULAR ASSIST DEVICE PERMANENT (VAD), ECC, JACQUE, EPI AORTIC US BY DR Ashley Edwards.    
POD:  34 
  
Impression / Plan:  
Heart Failure Status: NYHA Class IV Chronic systolic heart failure due to ICM, NYHA Class IV, EF < 15%, cardiogenic shock bridged with Impella 5.0 support to HM 3 implant S/p Impella removal and LVAD implant 11/18/19 RPMs 6400 rpm - frequent PI events TTE with bubble study negative for PFO Continue milrinone 0.2mcg/kg/min - consider wean tomorrow if PI events decrease Unable to obtain CardioMEMS readings over the weekend - try on Monday Hold diuretics 500 gm albumin today Cont Sildenafil 40 mg PO TID - rx sent to local pharmacy to initiate PA Intolerant of BB due to RV failure Intolerant of ACEi/ARB/ARNI/AA due to JUAN J on CKD 3 Strict I/O, daily weights, Na+ restricted diet Continue LVAD education Remove suture from drive line exit site on 12/26 and decrease dressing change frequency to three times weekly TTE 12/16- EF 15-20% QTc 374 12/18/19 Generalized myoclonus Improved Appreciate Neurology input Decrease Keppra due to somnolence Head CT negative for acute process EEG suggestive of mild generalized encephalopathic process, possibly related to underlying structural brain injury vs metabolic abnormality Monitor closely  
  
Anticoagulation for LVAD, INR goal 2-3 No ASA d/t hematuria INR 1.3 Cont coumadin Epistaxis Resolved Afrin soaked guaze ENT consult appreciated Monitor for now  
  
Acute Respiratory Failure post op Improved with aggressive diuresis Off supplemental O2 
TTE with Bubble study negative for PFO Pulmonary Consult appreciated Pulmonary hygiene Cont home CPAP- must use while sleeping Acute on CKD 3, atrophic left kidney Appreciate Nephrology assistance Watch Cr - improving Daily diuretic dosing - hold today Avoid nephrotoxins, trend labs Renally dose meds  
  
CAD s/p CABG Off ASA d/t hematuria No BB d/t RV failure Hold statin due to recent hepatic failure LHC performed 11/13 - low likelihood of benefit from revascularization  
  
Hx of VF arrest s/p BiV-ICD No recent shocks Keep K > 4 and Mg > 2  
   
PAF Dig level 0.9 -cont dig (62.5 mcg qMWF) No BB due to RV failure Repeat TFTs WNL 
  
Hx of gout Uric acid WNL Acute blood loss anemia Likely due to hematuria Cont octreotide 25 mcg SQ TID Transfuse to keep Hgb > 7.0 Fecal occult 12/14 negative, positive on 12/17 Appreciate urology recommendations Cont CBI GI following, low suspicion for GIB 
  
Suspected aspiration pneumonia Sputum culture showing scant growth of yeast 
Cefepime complete Urinary retention, c/b serratia UTI and hematuria Appreciate Urology consult Repeat UA with cx negative 12/15 Watch off abx Cystoscopy performed 11/13 shows catheter induced cystitis Sepsis Alert Paired Blood Neg 
Urine cx negative Sputum cx when able Malnutrition Appreciate Nutritionist consult Prealbumin weekly - 13 on 12/16 Diet as tolerated Intolerant of mirtazapine d/t tremors, confusion Decrease Marinol to 2.5 mg PO qPM d/t lethargy Cont MVI 
  
COPD Appreciate Pulmonology assistance Continue nebs PRN   
  
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  
  
Hx of sternal wound infection, sternectomy Sternum closed post op- monitor  
  
Vocal cord paralysis Improved ENT recs appreciated Neck CT- R neck edema, no airway compromise Speech therapy following - appreciate input Anxiety Klonipin 0.5 mg TID prn anxiety Depression Cont lexapro 10 mg qpm 
Monitor QTc Monitor sodium  
  
Debility Continue PT/OT  
  
Ppx Protonix PT/OT Bowel regimen PICC placed 11/22/19  
  
Dispo: Will need IP rehab. Not ready for discharge at this time LVAD INTERROGATION: 
Device interrogated in person Device function normal, normal flow, multiple PI events LVAD Pump Speed (RPM): 6400 Pump Flow (LPM): 5.8 MAP: 96(after working with PT) PI (Pulsitility Index): 2.8 Power: 5.6  Test: No 
Back Up  at Bedside & Labeled: Yes Power Module Test: No 
Driveline Site Care: No 
Driveline Dressing: Clean, Dry, and Intact Outpatient: No 
MAP in Therapeutic Range (Outpatient): Yes Testing Alarms Reviewed: Yes 
Back up SC speed: 6400 Back up Low Speed Limit: 6000 Emergency Equipment with Patient?: Yes Emergency procedures reviewed?: Yes Drive line site inspected?: No 
Drive line intergrity inspected?: Yes Drive line dressing changed?: No 
 
PHYSICAL EXAM: 
Visit Vitals BP 91/72 (BP 1 Location: Left arm, BP Patient Position: At rest) Pulse 74 Temp 97.7 °F (36.5 °C) Resp 18 Ht 6' 2\" (1.88 m) Wt 166 lb 14.2 oz (75.7 kg) SpO2 100% BMI 21.43 kg/m² Physical Exam  
Constitutional: He is oriented to person, place, and time. He appears well-developed. He appears cachectic. No distress. HENT:  
Head: Normocephalic. Neck: Normal range of motion. Neck supple. No JVD present. Cardiovascular: Normal rate, regular rhythm and normal heart sounds. + VAD hum Pulmonary/Chest: Effort normal and breath sounds normal. No respiratory distress. Abdominal: Soft. Bowel sounds are normal. He exhibits no distension. Dobhoff in place Genitourinary:    Genitourinary Comments: Hematuria Musculoskeletal: Normal range of motion. General: No edema. Neurological: He is alert and oriented to person, place, and time. Skin: Skin is warm and dry. Nursing note and vitals reviewed. REVIEW OF SYSTEMS: 
Review of Systems Constitutional: Positive for malaise/fatigue. Negative for chills and fever. HENT: Positive for hearing loss. Negative for nosebleeds. Respiratory: Negative for cough and shortness of breath. Mild dyspnea Cardiovascular: Negative for chest pain, palpitations, orthopnea and leg swelling. Gastrointestinal: Negative for heartburn, nausea and vomiting. Genitourinary: Positive for hematuria. Musculoskeletal: Negative. Neurological: Positive for weakness. Negative for dizziness and headaches. Endo/Heme/Allergies: Bruises/bleeds easily. PAST MEDICAL HISTORY: 
Past Medical History:  
Diagnosis Date  Degenerative disc disease, lumbar  Heart failure ([facility] [address]  High cholesterol  Hypertension  Paroxysmal atrial fibrillation ([facility] [address] 4/2/2019  Spinal stenosis PAST SURGICAL HISTORY: 
Past Surgical History:  
Procedure Laterality Date  COLONOSCOPY Left 11/11/2019 COLONOSCOPY at bedside performed by Kev Fischer MD at [facility] 391  HX CORONARY ARTERY BYPASS GRAFT    
 triple  HX HERNIA REPAIR    
 HX IMPLANTABLE CARDIOVERTER DEFIBRILLATOR  IN CARDIOVERSION ELECTIVE ARRHYTHMIA EXTERNAL N/A 6/10/2019 EP CARDIOVERSION performed by Tammie Russell MD at [place] [address], [facility] CATH LAB  IN CARDIOVERSION ELECTIVE ARRHYTHMIA EXTERNAL N/A 6/18/2019 EP CARDIOVERSION performed by Kimani De Los Santos MD at [place] [address], [facility] CATH LAB  IN INSJ/RPLCMT PERM DFB W/TRNSVNS LDS 1/DUAL CHMBR N/A 6/21/2019 INSERT ICD BIV MULTI performed by Say Fowler MD at [place] [address], [facility] CATH LAB  IN TCAT IMPL WRLS P-ART PRS SNR L-T HEMODYN MNTR N/A 9/18/2019 IMPLANT HEART FAILURE MONITORING DEVICE performed by Kaur Pastrana MD at [place] [address], [facility] [address] CATH LAB FAMILY HISTORY: 
Family History Problem Relation Age of Onset  Lung Disease Mother  Hypertension Mother Jessica Wright Arthritis-osteo Mother  Heart Disease Mother  Heart Disease Father  Diabetes Father SOCIAL HISTORY: 
Social History Socioeconomic History  Marital status:  Spouse name: Not on file  Number of children: Not on file  Years of education: Not on file  Highest education level: Not on file Tobacco Use  Smoking status: Former Smoker Last attempt to quit: 2010 Years since quittin.0  Smokeless tobacco: Never Used Substance and Sexual Activity  Alcohol use: Not Currently Comment: rarely  Drug use: Never Other Topics Concern LABORATORY RESULTS: 
  
Labs Latest Ref Rng & Units 2019 WBC 4.1 - 11.1 K/uL 4.9 - 4.7 - - 4.6 -  
RBC 4.10 - 5.70 M/uL 2.70(L) - 2.66(L) - - 2.48(L) - Hemoglobin 12.1 - 17.0 g/dL 7. 8(L) 8.2(L) 7. 7(L) 7. 8(L) 7. 6(L) 7. 2(L) 7. 6(L) Hematocrit 36.6 - 50.3 % 25. 2(L) 26. 2(L) 25. 0(L) 24. 8(L) 23. 9(L) 22. 9(L) 24. 1(L) MCV 80.0 - 99.0 FL 93.3 - 94.0 - - 92.3 - Platelets 440 - 982 K/uL 186 - 178 - - 164 - Lymphocytes 12 - 49 % - - - - - - - Monocytes 5 - 13 % - - - - - - - Eosinophils 0 - 7 % - - - - - - - Basophils 0 - 1 % - - - - - - - Albumin 3.5 - 5.0 g/dL 2. 5(L) - 2. 5(L) - - 2. 4(L) -  
Calcium 8.5 - 10.1 MG/DL 9.1 - 9.1 - - 8.9 -  
SGOT 15 - 37 U/L 26 - 25 - - 23 - Glucose 65 - 100 mg/dL 113(H) - 104(H) - - 113(H) -  
BUN 6 - 20 MG/DL 33(H) - 32(H) - - 35(H) -  
Creatinine 0.70 - 1.30 MG/DL 1.79(H) - 1.68(H) - - 1.79(H) - Sodium 136 - 145 mmol/L 133(L) - 134(L) - - 131(L) - Potassium 3.5 - 5.1 mmol/L 3.9 - 3.5 - - 3.7 -  
TSH 0.36 - 3.74 uIU/mL - - - - - - -  
PSA 0.01 - 4.0 ng/mL - - - - - - -  
LDH 85 - 241 U/L 218 - 225 - - 248(H) -  
CEA ng/mL - - - - - - - Some recent data might be hidden Lab Results Component Value Date/Time TSH 2.30 2019 03:29 AM  
 TSH 2.40 10/25/2019 07:39 PM  
 TSH 2.45 2019 04:16 AM  
 
 
ALLERGY: 
No Known Allergies CURRENT MEDICATIONS: 
Current Facility-Administered Medications Medication Dose Route Frequency  warfarin (COUMADIN) tablet 3 mg  3 mg Oral ONCE  
 dronabinol (MARINOL) capsule 2.5 mg  2.5 mg Oral DAILY  levETIRAcetam (KEPPRA) tablet 250 mg  250 mg Oral BID  senna-docusate (PERICOLACE) 8.6-50 mg per tablet 1 Tab  1 Tab Oral BID  polyethylene glycol (MIRALAX) packet 17 g  17 g Oral DAILY  albuterol-ipratropium (DUO-NEB) 2.5 MG-0.5 MG/3 ML  3 mL Nebulization Q6H PRN  therapeutic multivitamin (THERAGRAN) tablet 1 Tab  1 Tab Oral DAILY  escitalopram oxalate (LEXAPRO) tablet 10 mg  10 mg Oral QPM  
 potassium chloride SR (KLOR-CON 10) tablet 40 mEq  40 mEq Oral DAILY  alteplase (CATHFLO) 1 mg in sterile water (preservative free) 1 mL injection  1 mg InterCATHeter PRN  finasteride (PROSCAR) tablet 5 mg  5 mg Oral DAILY  spironolactone (ALDACTONE) tablet 25 mg  25 mg Oral DAILY  clonazePAM (KlonoPIN) tablet 0.5 mg  0.5 mg Oral TID PRN  
 milrinone (PRIMACOR) 20 MG/100 ML D5W infusion  0.2 mcg/kg/min IntraVENous CONTINUOUS  
 sildenafil (pulm.hypertension) (REVATIO) tablet 40 mg  40 mg Oral TID  magnesium oxide (MAG-OX) tablet 400 mg  400 mg Oral BID  diphenhydrAMINE (BENADRYL) capsule 25 mg  25 mg Oral QHS PRN  
 octreotide (SANDOSTATIN) injection 25 mcg  25 mcg SubCUTAneous TID  benzocaine-menthol (CEPACOL) lozenge 1 Lozenge  1 Lozenge Mucous Membrane PRN  
 digoxin (LANOXIN) tablet 0.0625 mg  62.5 mcg Oral Q MON, WED & FRI  pantoprazole (PROTONIX) tablet 40 mg  40 mg Oral ACB  allopurinol (ZYLOPRIM) tablet 100 mg  100 mg Oral DAILY  arformoterol (BROVANA) neb solution 15 mcg  15 mcg Nebulization BID RT And  
 budesonide (PULMICORT) 500 mcg/2 ml nebulizer suspension  500 mcg Nebulization BID RT  
 artificial saliva (MOUTH KOTE) 1 Spray  1 Spray Oral PRN  
 lactobac ac& pc-s.therm-b.anim (TIAN Q/RISAQUAD)  1 Cap Oral DAILY  oxyCODONE IR (ROXICODONE) tablet 5 mg  5 mg Oral Q6H PRN  
 balsam peru-castor oil (VENELEX) ointment   Topical TID  tamsulosin (FLOMAX) capsule 0.4 mg  0.4 mg Oral DAILY  insulin lispro (HUMALOG) injection   SubCUTAneous AC&HS  Warfarin MD/NP dosing   Other PRN  
 epoetin yvonne-epbx (RETACRIT) injection 20,000 Units  20,000 Units SubCUTAneous Q7D  
 sodium chloride (NS) flush 5-40 mL  5-40 mL IntraVENous Q8H  
 sodium chloride (NS) flush 5-40 mL  5-40 mL IntraVENous PRN  
 acetaminophen (TYLENOL) tablet 650 mg  650 mg Oral Q6H PRN  
 naloxone (NARCAN) injection 0.4 mg  0.4 mg IntraVENous PRN  
 ondansetron (ZOFRAN) injection 4 mg  4 mg IntraVENous Q4H PRN  
 bisacodyl (DULCOLAX) tablet 5 mg  5 mg Oral DAILY PRN  
 sucralfate (CARAFATE) tablet 1 g  1 g Oral AC&HS  influenza vaccine 2019-20 (6 mos+)(PF) (FLUARIX/FLULAVAL/FLUZONE QUAD) injection 0.5 mL  0.5 mL IntraMUSCular PRIOR TO DISCHARGE  hydrALAZINE (APRESOLINE) 20 mg/mL injection 20 mg  20 mg IntraVENous Q6H PRN  
 dextrose 10 % infusion 125-250 mL  125-250 mL IntraVENous PRN Thank you for allowing me to participate in this patient's care. Jareth Mata NP [address], [address] [facility] Phone: [phone] Fax: [phone] [facility] ATTENDING ADDENDUM Patient was seen and examined in person. Data and notes were reviewed. I have discussed and agree with the plan as noted in the NP note above without further additions. Rober Fernandez MD PhD 
[address] [employer] [facility] 9 [facility]

## 2019-12-23 NOTE — PROGRESS NOTES
Speech Pathology:  Chart reviewed and met with patient's wife at bedside. Reviewed diet and nectar thickened liquids with patient's wife. Demonstrated thickening and wife demonstrated thickening liquids using Simply Thick nectar thickening liquid packet. Wife demonstrated and verbalized understanding. Provided with information packet regarding thickened liquids. Patient sleeping soundly and did not rouse during treatment session therefore PO trials not offered.    
 
Didi Bird, SLP

## 2019-12-23 NOTE — PROGRESS NOTES
ID Progress Note 
2019 Subjective:  
 
Bleeding issues seem improved Objective: Antibiotics: 1. Levaquin 2. Rifampin 3. Fluconazole 4. Vancomycin 5. Cefazolin 6. Zosyn Vitals:  
Visit Vitals BP 91/72 (BP 1 Location: Left arm, BP Patient Position: At rest) Pulse 73 Temp 97.4 °F (36.3 °C) Resp 18 Ht 6' 2\" (1.88 m) Wt 75.7 kg (166 lb 14.2 oz) SpO2 97% BMI 21.43 kg/m² Tmax:  Temp (24hrs), Av.7 °F (36.5 °C), Min:97.4 °F (36.3 °C), Max:97.9 °F (36.6 °C) Exam:  Lungs:  clear to auscultation bilaterally Heart:  regular rate and rhythm Abdomen:  soft, non-tender. Bowel sounds normal. No masses,  no organomegaly Labs:     
Recent Labs  
  19 
0247 19 
1219 19 
0405 19 
2150 19 
7319 19 
0016 WBC 4.9  --  4.7  --   --  4.6 HGB 7.8* 8.2* 7.7* 7.8* 7.6* 7.2*  
  --  178  --   --  164 BUN 33*  --  32*  --   --  35* CREA 1.79*  --  1.68*  --   --  1.79* SGOT 26  --  25  --   --  23  
*  --  111  --   --  97  
TBILI 0.7  --  0.7  --   --  0.6 Cultures: No results found for: [facility] Lab Results Component Value Date/Time Culture result: NO GROWTH 5 DAYS 12/15/2019 03:37 PM  
 Culture result: NO GROWTH 1 DAY 12/15/2019 03:30 PM  
 Culture result: NO GROWTH 5 DAYS 2019 11:23 PM  
 
 
Radiology:  
 
Line/Insert Date:        
 
Assessment:  
 
1. UTI--Serratia (2 biotypes) from culture and small amount of Enterococcus 2. CHF/cardiomyopathy 3. ?aspiration event(s) 4. Renal insufficiency 5. Respiratory difficulties Objective: 1. Monitor off antibiotics 2. Work up any new fevers Camacho Lazcano MD

## 2019-12-24 NOTE — PROGRESS NOTES
Urology Progress Note Patient: Princess Kim MRN: [id number]  SSN: [id number] YOB: 1950  Age: 71 y.o. Sex: male ADMITTED: 10/25/2019 to Pricila Jackson MD for Acute decompensated heart failure ([facility] [address] [I50.9] POD# 29 Days Post-Op Procedure(s): LEFT BRACHIAL THROMBECTOMY Urine is clear with moderate CBI going but pink when CBI slowed down. No clots. Vitals: Temp (24hrs), Av.6 °F (36.4 °C), Min:97.4 °F (36.3 °C), Max:97.7 °F (36.5 °C) Blood pressure 91/72, pulse 75, temperature 97.7 °F (36.5 °C), resp. rate 18, height 6' 2\" (1.88 m), weight 75.7 kg (166 lb 14.2 oz), SpO2 97 %. Intake and Output: 
 1901 -  0700 In: 59798.1 [P.O.:600; I.V.:415.1] Out: 61000 Labs: 
Labs:  
Lab Results Component Value Date/Time WBC 4.4 2019 01:23 AM  
 HGB 7.8 (L) 2019 01:23 AM  
 Creatinine 1.80 (H) 2019 01:23 AM  
 
 
 
Assessment/Plan: 
 Mild but persistent hematuria. Continue to wean CBI. May need cysto/fulguration if bleeding continues. Signed By: Rodo Martinez MD - 2019

## 2019-12-24 NOTE — PROGRESS NOTES
Problem: Mobility Impaired (Adult and Pediatric) Goal: *Acute Goals and Plan of Care (Insert Text) Description FUNCTIONAL STATUS PRIOR TO ADMISSION: Patient was modified independent using a single point cane for functional mobility. Patient reports an increasingly sedentary lifestyle 2* fatigue and SOB/dyspnea. Retired (so is his wife). Patient reports x 3 falls within the last couple of weeks. Patient is wearing either nasal cannula or CPAP at night. LVAD work-up has been initiated. HOME SUPPORT PRIOR TO ADMISSION: The patient lived with his wife, but did not require physical assistance. Physical Therapy Goals: 
 
Weekly reassessment completed 12/19/2019 and goals downgraded as appropriate. 1.  Patient will move from supine to sit and sit to supine, scoot up and down and roll side to side in bed with contact guard assistance within 7 days. 2.  Patient will perform sit to/from stand with minimal assistance x 1 within 7 days. 3.  Patient will ambulate 25 feet with least restrictive assistive device and moderate assistance within 7 days. 4.  Stair goal to be formulated when appropriate. 5.  Patient will perform cardiac exercises per protocol with supervision within 7 days. 6.  Patient will verbally and functionally recall mindful movement principles (Move in the Tube) with minimal verbal cuing/reminding within 7 days. 7.  Patient will perform power exchange for power module to/from battery with moderate assistance within 7 days. Re-evaluation completed 11/20/2019 and new goals formulated following LVAD implantation. Reviewed and cont 12/3/2019. Reviewed and continued 12/12/2019 1. Patient will move from supine to sit and sit to supine, scoot up and down and roll side to side in bed with minimal assistance/contact guard assist within 7 days. 2.  Patient will perform sit to/from stand with minimal assistance/contact guard assist within 7 days. 3.  Patient will ambulate 150 feet with least restrictive assistive device and minimal assistance/contact guard assist within 7 days. 4.  Patient will ascend/descend 4 stairs with handrail(s) with minimal assistance/contact guard assist within 7 days. 5.  Patient will perform cardiac exercises per protocol with supervision within 7 days. 6.  Patient will verbally and functionally recall 3/3 sternal precautions within 7 days. 7.  Patient will perform power exchange for power module to/from battery with moderate assistance  within 7 days. Initiated 10/27/2019; updated 11/15/2019 1. Patient will move from supine to sit and sit to supine, scoot up and down and roll side to side in bed with independence within 7 days. 2.  Patient will perform sit to/from stand with supervision/set-up within 7 days. 3.  Patient will ambulate 200 feet with least restrictive assistive device and supervision/set-up within 7 days. 4.  Patient will ascend/descend 4 stairs with  handrail(s) with supervision/set-up within 7 days for functional strengthening and community reintegration. Mliss Peace 5.  Patient will verbally and functionally recall 3/3 sternal precautions within 7 days in preparation for LVAD implantation. 6.  Patient will perform a mock power exchange for power module to/from battery with supervision/set-up within 7 days in preparation for LVAD implantation. 1.  Patient will move from supine to sit and sit to supine, scoot up and down and roll side to side in bed with independence within 7 days. 2.  Patient will perform sit to/from stand with supervision/set-up within 7 days. 3.  Patient will ambulate 150 feet with least restrictive assistive device and supervision/set-up within 7 days. 4.  Patient will ascend/descend 4 stairs with  handrail(s) with supervision/set-up within 7 days for functional strengthening and community reintegration. Mliss Peace 5.  Patient will verbally and functionally recall 3/3 sternal precautions within 7 days in preparation for LVAD implantation. 6.  Patient will perform a mock power exchange for power module to/from battery with supervision/set-up within 7 days in preparation for LVAD implantation. Outcome: Progressing Towards Goal 
  
PHYSICAL THERAPY TREATMENT Patient: Aydee Diaz (75 y.o. male) Date: 12/24/2019 Diagnosis: Acute decompensated heart failure ([facility] [address] [I50.9] <principal problem not specified> Procedure(s) (LRB): LEFT BRACHIAL THROMBECTOMY (Left) 29 Days Post-Op Precautions: Fall, Sternal(LVAD), Bed/Chair Alarm Chart, physical therapy assessment, plan of care and goals were reviewed. ASSESSMENT Patient continues with skilled PT services and is minimally progressing towards goals - however, noted improved alertness this date. Patient's standing tolerance ~ 2 minutes (requiring moderate assist x 1) during total assist david-hygiene. Introduced stand-step pivot this date (typically perform pivot only) - to the right, eye patched donned, moderate assist x 2 needed secondary to posterior-left lateral trunk leaning + ataxia. Between each task, patient required increased time to rest/perform pursed-lip breathing, and for verbal instruction (patient is very impulsive with poor motor planning abilities). Patient seated in recliner at conclusion of session. Continue to highly encourage discharge to rehab once medically stable. Current Level of Function Impacting Discharge (mobility/balance): Mod A x 2 for stand step- pivot transfers Other factors to consider for discharge: Complex hospital stay, LVAD, significantly below his functional baseline PLAN : 
Patient continues to benefit from skilled intervention to address the above impairments. Continue treatment per established plan of care. to address goals. Recommendation for discharge: (in order for the patient to meet his/her long term goals) Therapy 3 hours per day 5-7 days per week - working towards This discharge recommendation: 
Has been made in collaboration with the attending provider and/or case management IF patient discharges home will need the following DME: to be determined (TBD) SUBJECTIVE:  
Patient stated I'm so shaky.  OBJECTIVE DATA SUMMARY:  
Critical Behavior: 
Neurologic State: Drowsy Orientation Level: Oriented X4 Cognition: Appropriate for age attention/concentration Safety/Judgement: Decreased insight into deficits, Decreased awareness of need for safety, Decreased awareness of need for assistance Functional Mobility Training: 
Transfers: 
Sit to Stand: Moderate assistance;Assist x2; Additional time Stand to Sit: Moderate assistance;Assist x2; Additional time Bed to Chair: Assist x2; Moderate assistance Balance: 
Sitting: Intact; Without support Sitting - Static: Fair (occasional) Sitting - Dynamic: Fair (occasional) Standing: Impaired; Without support Standing - Static: Fair;Constant support Standing - Dynamic : Poor;Constant support Therapeutic Exercises: 1. Seated piriformis stretch x 30 sec each leg 2. Targeted LAQs + eccentric control focus 3. Heel/toe raises with emphasis on pacing Activity Tolerance:  
Poor, desaturates with exertion and requires oxygen, requires frequent rest breaks, and observed SOB with activity Please refer to the flowsheet for vital signs taken during this treatment. After treatment patient left in no apparent distress:  
Seated in recliner, Call bell within reach COMMUNICATION/COLLABORATION:  
The patients plan of care was discussed with: Occupational Therapist, Registered Nurse, Physician, and  Cassandra Horan PT, DPT Time Calculation: 24 mins

## 2019-12-24 NOTE — ROUTINE PROCESS
1930: Bedside shift change report given to Keeley Garrison (oncoming nurse) by Latonia Linder RN (offgoing nurse). Report included the following information SBAR and Kardex.

## 2019-12-24 NOTE — PROGRESS NOTES
Problem: Self Care Deficits Care Plan (Adult) Goal: *Acute Goals and Plan of Care (Insert Text) Description FUNCTIONAL STATUS PRIOR TO ADMISSION: Patient was modified independent using a single point cane for functional mobility. Patient able to shower and dress himself. However, patient required assistance for household management from his wife. HOME SUPPORT: The patient lived alone with wife to provide assistance. Occupational Therapy Goals: 
 
Goals reviewed and continued 12/19/2019 OT weekly reassessment 12/6/19: goals modified 1. Patient will perform upper body dressing moderate assistance within 7 day(s). 2.  Patient will perform lower body dressing with moderate assistance within 7 day(s). 3.  Patient will perform grooming seated EOB with minimum assistance within 7 day(s). 4.  Patient will perform toilet transfers with minimum assistance within 7 day(s). 5.  Patient will perform all aspects of toileting with moderate assistance within 7 day(s). 6.  Patient will participate in upper extremity therapeutic exercise/activities with supervision/set-up-min A for 5 minutes within 7 day(s). 7.  Patient will utilize energy conservation techniques during functional activities with verbal cues within 7 day(s). 8. Patient will verbalize 3/5 LVAD components with min verbal cues within 7 day (s). OT weekly reassessment 11/29/19: goals modified below 1. Patient will perform upper body dressing including LVAD switchovers with moderate assistance within 7 day(s). 2.  Patient will perform lower body dressing with minimal assistance within 7 day(s). 3.  Patient will perform grooming in standing with minimum assistance within 7 day(s). 4.  Patient will perform toilet transfers with minimum assistance within 7 day(s). 5.  Patient will perform all aspects of toileting with minimal assistance within 7 day(s).  
6.  Patient will participate in upper extremity therapeutic exercise/activities with supervision/set-up for 5 minutes within 7 day(s). 7.  Patient will utilize energy conservation techniques during functional activities with verbal cues within 7 day(s). 8. Patient will verbalize LVAD terminology with verbal cues within 7 day (s). Goals reviewed and continued/modified as follows 11/20/19 s/p LVAD implantation 1. Patient will perform upper body dressing including LVAD switchovers with moderate assistance within 7 day(s). 2.  Patient will perform lower body dressing with minimal assistance within 7 day(s). 3.  Patient will perform grooming with supervision/setup within 7 day(s). 4.  Patient will perform toilet transfers supervision/setupmodified independence within 7 day(s). 5.  Patient will perform all aspects of toileting with minimal assistance within 7 day(s). 6.  Patient will participate in upper extremity therapeutic exercise/activities with supervision/set-up for 5 minutes within 7 day(s). 7.  Patient will utilize energy conservation techniques during functional activities with verbal cues within 7 day(s). 8. Patient will verbalize LVAD terminology with verbal cues within 7 day (s). Goals reviewed and continued/modified as follows 11/12/19 1. Patient will perform bathing with supervision/set-up within 7 day(s). 2.  Patient will perform lower body dressing with supervision/set-up within 7 day(s). 3.  Patient will perform grooming with modified independence within 7 day(s). 4.  Patient will perform toilet transfers with modified independence within 7 day(s). 5.  Patient will perform all aspects of toileting with supervision/set-up within 7 day(s). 6.  Patient will participate in upper extremity therapeutic exercise/activities with supervision/set-up for 5 minutes within 7 day(s). 7.  Patient will utilize energy conservation techniques during functional activities with verbal cues within 7 day(s). 8. Patient will verbalize LVAD terminology with verbal cues within 7 day (s) in preparation for implant. Continue all goals 10/30/19 post re-eval for Impella removal  
Initiated 10/28/2019 1. Patient will perform bathing with supervision/set-up within 7 day(s). 2.  Patient will perform lower body dressing with supervision/set-up within 7 day(s). 3.  Patient will perform grooming with modified independence within 7 day(s). 4.  Patient will perform toilet transfers with modified independence within 7 day(s). 5.  Patient will perform all aspects of toileting with supervision/set-up within 7 day(s). 6.  Patient will participate in upper extremity therapeutic exercise/activities with supervision/set-up for 5 minutes within 7 day(s). 7.  Patient will utilize energy conservation techniques during functional activities with verbal cues within 7 day(s). Outcome: Progressing Towards Goal 
 OCCUPATIONAL THERAPY TREATMENT Patient: Vicente Gordillo (75 y.o. male) Date: 12/24/2019 Diagnosis: Acute decompensated heart failure ([facility] [address] [I50.9] <principal problem not specified> Procedure(s) (LRB): LEFT BRACHIAL THROMBECTOMY (Left) 29 Days Post-Op Precautions: Fall, Sternal(LVAD ) Chart, occupational therapy assessment, plan of care, and goals were reviewed. ASSESSMENT Patient continues with skilled OT services and is progressing towards goals. Patient with increased alertness today however presenting with generalized weakness, decreased endurance, decreased activity tolerance, impaired balance, and impaired coordination with bowel movement in session on BSC and requiring MOD A x 2 for functional transfers (wearing eye patch and with L lateral lean). Patient completed 6/6 BUE cardiac exercises seated in chair today and completed simulated LB ADLs (donning socks) x MIN A seated in chair.  Patient setup for self-feeding tasks however noted patient continues with decreased appetite and requires encouragement. Patient recalling some LVAD terminology today () however requiring continued education on other equipment. Continue to recommend IPR. Current Level of Function Impacting Discharge (ADLs): MOD A x 2 toilet transfers; MAX A toileting tasks Other factors to consider for discharge: balance; LVAD HM III 
    
 
PLAN : 
Patient continues to benefit from skilled intervention to address the above impairments. Continue treatment per established plan of care. to address goals. Recommend with staff: OOB x 3 to chair with chair alarm; BUE cardiac exercises; LVAD education Recommend next OT session: LVAD switchover Recommendation for discharge: (in order for the patient to meet his/her long term goals) Therapy 3 hours per day 5-7 days per week This discharge recommendation: 
Has been made in collaboration with the attending provider and/or case management IF patient discharges home will need the following DME: to be determined (TBD) SUBJECTIVE:  
Patient stated I am almost done (with BM).  
 
OBJECTIVE DATA SUMMARY:  
Cognitive/Behavioral Status: 
Neurologic State: Drowsy Orientation Level: Oriented X4 Cognition: Appropriate for age attention/concentration Perception: Appears intact Perseveration: No perseveration noted Safety/Judgement: Decreased insight into deficits; Decreased awareness of need for safety;Decreased awareness of need for assistance Functional Mobility and Transfers for ADLs: 
 
Transfers: 
Sit to Stand: Moderate assistance;Assist x2; Additional time Functional Transfers Toilet Transfer : Moderate assistance;Assist x2; Additional time(<> BSC) Balance: 
Sitting: Intact; Without support Sitting - Static: Fair (occasional) Sitting - Dynamic: Fair (occasional) Standing: Impaired; Without support Standing - Static: Fair;Constant support Standing - Dynamic : Poor;Constant support ADL Intervention: Lower Body Dressing Assistance Socks: Minimum assistance Toileting Toileting Assistance: Maximum assistance Bowel Hygiene: Maximum assistance Cognitive Retraining Safety/Judgement: Decreased insight into deficits; Decreased awareness of need for safety;Decreased awareness of need for assistance Patient encouraged to keep proximal BUE close to rib cage. Increase activity tolerance for home, work, and sexual intercourse by pacing self with increasing the arm exercises, sitting duration, frequency OOB, walking, standing, and ADLs. Instructed and indicated understanding of s/s of too much activity, how to respond to s/s safely. Therapeutic Exercises:  
Patient instructed on the benefits and demonstrated cardiac exercises while seated with Supervision. CARDIAC EXERCISE Sets Reps Active  Active Assist   
Passive  Self ROM Comments Shoulder flexion  1  5  [x]                            []                             []                             []                                 
Shoulder abduction  1  5  [x]                             []                             []                             []                               
Scapular elevation  1  5  [x]                             []                              []                             []                               
Scapular retraction  1  5  [x]                             []                             []                             []                               
Trunk rotation  1  5  [x]                             []                             []                             []                               
Trunk sidebending  1  5  [x]                             []                              []                             []                                     
 
 
Activity Tolerance:  
Fair Please refer to the flowsheet for vital signs taken during this treatment. After treatment patient left in no apparent distress:  
Sitting in chair, Call bell within reach, Bed / chair alarm activated, and Caregiver / family present COMMUNICATION/COLLABORATION:  
The patients plan of care was discussed with: Physical Therapist and Registered Nurse Tru Lucio Time Calculation: 23 mins

## 2019-12-24 NOTE — PROGRESS NOTES
ID Progress Note 
2019 Subjective:  
 
Bleeding issues seem improved Objective: Antibiotics: 1. Levaquin 2. Rifampin 3. Fluconazole 4. Vancomycin 5. Cefazolin 6. Zosyn Vitals:  
Visit Vitals BP 91/72 (BP 1 Location: Left arm, BP Patient Position: At rest) Pulse 78 Temp 98.2 °F (36.8 °C) Resp 18 Ht 6' 2\" (1.88 m) Wt 79.5 kg (175 lb 4.3 oz) SpO2 97% BMI 22.50 kg/m² Tmax:  Temp (24hrs), Av.7 °F (36.5 °C), Min:97.4 °F (36.3 °C), Max:98.2 °F (36.8 °C) Exam:  Lungs:  clear to auscultation bilaterally Heart:  regular rate and rhythm Abdomen:  soft, non-tender. Bowel sounds normal. No masses,  no organomegaly Labs:     
Recent Labs  
  19 
0123 19 
1725 19 
0247 19 
1219 19 
0405 WBC 4.4  --  4.9  --  4.7 HGB 7.8* 7.7* 7.8* 8.2* 7.7*   --  186  --  178 BUN 34*  --  33*  --  32* CREA 1.80*  --  1.79*  --  1.68* SGOT 26  --  26  --  25  
*  --  122*  --  111 TBILI 0.6  --  0.7  --  0.7 Cultures: No results found for: [facility] Lab Results Component Value Date/Time Culture result: NO GROWTH 5 DAYS 12/15/2019 03:37 PM  
 Culture result: NO GROWTH 1 DAY 12/15/2019 03:30 PM  
 Culture result: NO GROWTH 5 DAYS 2019 11:23 PM  
 
 
Radiology:  
 
Line/Insert Date:        
 
Assessment:  
 
1. UTI--Serratia (2 biotypes) from culture and small amount of Enterococcus 2. CHF/cardiomyopathy 3. ?aspiration event(s) 4. Renal insufficiency 5. Respiratory difficulties Objective: 1. Monitor off antibiotics 2. Work up any new fevers 3. Group will see tomorrow if asked Vikash Andino MD

## 2019-12-24 NOTE — PROGRESS NOTES
[facility] Inpatient Progress Note Patient name: Prince Carias Patient : 1950 Patient MRN: [id number] Attending MD: Batsheva Lawrence MD 
Date of service: 19 Chief Complaint:  
Tavares Korean azucena LVAD implant 
  
HPI: Sona Kiser is a 71y.o. year old pleasant white male with a history of HTN, HLD, JOSE, CAD s/p cardiac arrest VFib s/p CABG () c/b sternal would infection and sternectomy, ischemic cardiomyopathy LVEF 15-20%, s/p ICD and with LBBB. He was admitted with acute on chronic systolic heart failure with massive volume overload > 20 lbs, in the setting of atrial fibrillation s/p failed DCCV and underwent DCCV and RHC on .  S/p BiVICD on 19 with Dr. Britany Roldan. He was discharged home home on IV milrinone on 19. [facility] has been followed closely by Dr. Cintia Oliver and the [facility]. 
  
Mr. Kiser was admitted for acute on chronic systolic heart failure. He underwent implantation of Impella 5.0 due to CS and has completed his LVAD evaluation. [facility] meets criteria for implant of HM3 as DT. He was NYHA Class IV prior to implant of Impella 5.0, has LVEF < 15%, was intolerant of GDMT due to symptomatic hypotension and renal dysfunction. He remains dependent on temporary MCS support. RHC  revealed compensated hemodynamics on Impella. His renal function has improved dramatically with improvement in his hemodynamics. He is not a suitable transplant candidate due to single kidney, sternectomy, debility, and frailty. He was reviewed by [facility] and felt to be a high risk heart transplant candidate due to multiple co-morbidities as well as the afore mentioned conditions.  He remained in the CCU and underwent a HM 3 implant as DT on . He was weaned off of pressors and transferred to [place] [address] where he continues to undergo PT/OT and hemodynamic optimization.   
  
24Hr Events Mentation and alertness markedly improved with decreased Keppra dosing Hgb 7.5 INR 1.3 
(-) 500 off diuretics Procedure:  Procedure(s): REMOVAL TEMPORARY LEFT VENTRICULAR ASSIST DEVICE, IMPLANTATION OF LEFT VENTRICULAR ASSIST DEVICE PERMANENT (VAD), ECC, JACQUE, EPI AORTIC US BY DR Kiesha Pisano.    
POD:  35 
  
Impression / Plan:  
Heart Failure Status: NYHA Class IV Chronic systolic heart failure due to ICM, NYHA Class IV, EF < 15%, cardiogenic shock bridged with Impella 5.0 support to HM 3 implant S/p Impella removal and LVAD implant 11/18/19 RPMs 6400 rpm - frequent PI events TTE with bubble study negative for PFO Continue milrinone 0.2mcg/kg/min Unable to obtain CardioMEMS readings over the weekend - try on Monday Resume Bumex 1 mg PO daily Cont Sildenafil 40 mg PO TID - rx sent to local pharmacy to initiate PA Intolerant of BB due to RV failure Intolerant of ACEi/ARB/ARNI/AA due to JUAN J on CKD 3 Strict I/O, daily weights, Na+ restricted diet Continue LVAD education Remove suture from drive line exit site on 12/26 and decrease dressing change frequency to three times weekly TTE 12/16- EF 15-20% QTc 374 12/18/19 Generalized myoclonus Improved Appreciate Neurology input Decreased Keppra due to somnolence - 125 mg po BID Head CT negative for acute process EEG suggestive of mild generalized encephalopathic process, possibly related to underlying structural brain injury vs metabolic abnormality Monitor closely  
  
Anticoagulation for LVAD, INR goal 2-3 No ASA d/t hematuria INR 1.3 Cont coumadin Epistaxis Resolved Afrin soaked guaze ENT consult appreciated Monitor for now  
  
Acute Respiratory Failure post op Improved with aggressive diuresis Off supplemental O2 
TTE with Bubble study negative for PFO Pulmonary Consult appreciated Pulmonary hygiene Cont home CPAP- must use while sleeping Acute on CKD 3, atrophic left kidney Appreciate Nephrology assistance Watch Cr - improving Daily diuretic dosing - 1 mg PO Bumex daily for now Avoid nephrotoxins, trend labs Renally dose meds  
  
CAD s/p CABG Off ASA d/t hematuria No BB d/t RV failure Hold statin due to recent hepatic failure LHC performed 11/13 - low likelihood of benefit from revascularization  
  
Hx of VF arrest s/p BiV-ICD No recent shocks Keep K > 4 and Mg > 2  
   
PAF Dig level 0.9 -cont dig (62.5 mcg qMWF) No BB due to RV failure Repeat TFTs WNL 
  
Hx of gout Uric acid WNL Acute blood loss anemia Likely due to hematuria Cont octreotide 25 mcg SQ TID Transfuse to keep Hgb > 7.0 Fecal occult 12/14 negative, positive on 12/17 Appreciate urology recommendations Cont CBI GI following, low suspicion for GIB 
  
Suspected aspiration pneumonia Sputum culture showing scant growth of yeast 
Cefepime complete Urinary retention, c/b serratia UTI and hematuria Appreciate Urology consult Repeat UA with cx negative 12/15 Watch off abx Cystoscopy performed 11/13 shows catheter induced cystitis Sepsis Alert Paired Blood Neg 
Urine cx negative Sputum cx when able Malnutrition Appreciate Nutritionist consult Prealbumin weekly - 20.1 on 12/23 Diet as tolerated Intolerant of mirtazapine d/t tremors, confusion Decreased Marinol to 2.5 mg PO qPM d/t lethargy Cont MVI 
  
COPD Appreciate Pulmonology assistance Continue nebs PRN   
  
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  
  
Hx of sternal wound infection, sternectomy Sternum closed post op- monitor  
  
Vocal cord paralysis Improved ENT recs appreciated Neck CT- R neck edema, no airway compromise Speech therapy following - appreciate input Anxiety Klonipin 0.5 mg TID prn anxiety Depression Cont lexapro 10 mg qpm 
Monitor QTc - 478 ms on 12/22 Monitor sodium  
  
Debility Continue PT/OT  
  
Ppx Protonix PT/OT Bowel regimen PICC placed 11/22/19  
  
Dispo: Will need IP rehab. Not ready for discharge at this time LVAD INTERROGATION: 
Device interrogated in person Device function normal, normal flow, multiple PI events LVAD Pump Speed (RPM): 6400 Pump Flow (LPM): 6 MAP: 79 
PI (Pulsitility Index): 2.4 Power: 5.5  Test: No 
Back Up  at Bedside & Labeled: Yes Power Module Test: No 
Driveline Site Care: No 
Driveline Dressing: Clean, Dry, and Intact Outpatient: No 
MAP in Therapeutic Range (Outpatient): Yes Testing Alarms Reviewed: Yes 
Back up SC speed: 6400 Back up Low Speed Limit: 6000 Emergency Equipment with Patient?: Yes Emergency procedures reviewed?: Yes Drive line site inspected?: No 
Drive line intergrity inspected?: Yes Drive line dressing changed?: Yes PHYSICAL EXAM: 
Visit Vitals BP 91/72 (BP 1 Location: Left arm, BP Patient Position: At rest) Pulse 79 Temp 98.2 °F (36.8 °C) Resp 18 Ht 6' 2\" (1.88 m) Wt 175 lb 4.3 oz (79.5 kg) SpO2 97% BMI 22.50 kg/m² Physical Exam  
Constitutional: He is oriented to person, place, and time. He appears well-developed. He appears cachectic. No distress. HENT:  
Head: Normocephalic. Neck: Normal range of motion. Neck supple. No JVD present. Cardiovascular: Normal rate, regular rhythm and normal heart sounds. + VAD hum Pulmonary/Chest: Effort normal and breath sounds normal. No respiratory distress. Abdominal: Soft. Bowel sounds are normal. He exhibits no distension. Genitourinary:    Genitourinary Comments: Hematuria Musculoskeletal: Normal range of motion. General: No edema. Neurological: He is alert and oriented to person, place, and time. Skin: Skin is warm and dry. Nursing note and vitals reviewed. REVIEW OF SYSTEMS: 
Review of Systems Constitutional: Positive for malaise/fatigue.  Negative for chills and fever.  
HENT: Positive for hearing loss. Negative for nosebleeds. Respiratory: Negative for cough and shortness of breath. Mild dyspnea Cardiovascular: Negative for chest pain, palpitations, orthopnea and leg swelling. Gastrointestinal: Negative for heartburn, nausea and vomiting. Genitourinary: Positive for hematuria. Musculoskeletal: Negative. Neurological: Positive for weakness. Negative for dizziness and headaches. Endo/Heme/Allergies: Bruises/bleeds easily. PAST MEDICAL HISTORY: 
Past Medical History:  
Diagnosis Date  Degenerative disc disease, lumbar  Heart failure ([facility]  High cholesterol  Hypertension  Paroxysmal atrial fibrillation ([facility] 4/2/2019  Spinal stenosis PAST SURGICAL HISTORY: 
Past Surgical History:  
Procedure Laterality Date  COLONOSCOPY Left 11/11/2019 COLONOSCOPY at bedside performed by Nikhil Cho MD at [address]  HX CORONARY ARTERY BYPASS GRAFT    
 triple  HX HERNIA REPAIR    
 HX IMPLANTABLE CARDIOVERTER DEFIBRILLATOR  SD CARDIOVERSION ELECTIVE ARRHYTHMIA EXTERNAL N/A 6/10/2019 EP CARDIOVERSION performed by Dg Cook MD at [place] [address], [address] CATH LAB  SD CARDIOVERSION ELECTIVE ARRHYTHMIA EXTERNAL N/A 6/18/2019 EP CARDIOVERSION performed by Elizabet Phillips MD at [place] [address], [address] CATH LAB  SD INSJ/RPLCMT PERM DFB W/TRNSVNS LDS 1/DUAL CHMBR N/A 6/21/2019 INSERT ICD BIV MULTI performed by Oren Kendrick MD at [place] [address], [address] CATH LAB  SD TCAT IMPL WRLS P-ART PRS SNR L-T HEMODYN MNTR N/A 9/18/2019 IMPLANT HEART FAILURE MONITORING DEVICE performed by Lisandra Fonseca MD at [place] [address], [address] CATH LAB FAMILY HISTORY: 
Family History Problem Relation Age of Onset  Lung Disease Mother  Hypertension Mother [facility] Arthritis-osteo Mother  Heart Disease Mother  Heart Disease Father  Diabetes Father SOCIAL HISTORY: 
Social History Socioeconomic History  Marital status:  Spouse name: Not on file  Number of children: Not on file  Years of education: Not on file  Highest education level: Not on file Tobacco Use  Smoking status: Former Smoker Last attempt to quit: 2010 Years since quittin.0  Smokeless tobacco: Never Used Substance and Sexual Activity  Alcohol use: Not Currently Comment: rarely  Drug use: Never Other Topics Concern LABORATORY RESULTS: 
  
Labs Latest Ref Rng & Units 2019 WBC 4.1 - 11.1 K/uL - 4.4 - 4.9 - 4.7 -  
RBC 4.10 - 5.70 M/uL - 2.70(L) - 2.70(L) - 2.66(L) - Hemoglobin 12.1 - 17.0 g/dL 7. 5(L) 7. 8(L) 7. 7(L) 7. 8(L) 8.2(L) 7. 7(L) 7. 8(L) Hematocrit 36.6 - 50.3 % 24. 8(L) 25. 5(L) 24. 7(L) 25. 2(L) 26. 2(L) 25. 0(L) 24. 8(L) MCV 80.0 - 99.0 FL - 94.4 - 93.3 - 94.0 - Platelets 383 - 158 K/uL - 186 - 186 - 178 - Lymphocytes 12 - 49 % - - - - - - - Monocytes 5 - 13 % - - - - - - - Eosinophils 0 - 7 % - - - - - - - Basophils 0 - 1 % - - - - - - - Albumin 3.5 - 5.0 g/dL - 2. 7(L) - 2. 5(L) - 2. 5(L) -  
Calcium 8.5 - 10.1 MG/DL - 9.0 - 9.1 - 9.1 -  
SGOT 15 - 37 U/L - 26 - 26 - 25 - Glucose 65 - 100 mg/dL - 118(H) - 113(H) - 104(H) -  
BUN 6 - 20 MG/DL - 34(H) - 33(H) - 32(H) -  
Creatinine 0.70 - 1.30 MG/DL - 1.80(H) - 1.79(H) - 1.68(H) - Sodium 136 - 145 mmol/L - 134(L) - 133(L) - 134(L) - Potassium 3.5 - 5.1 mmol/L - 4.6 - 3.9 - 3.5 -  
TSH 0.36 - 3.74 uIU/mL - - - - - - -  
PSA 0.01 - 4.0 ng/mL - - - - - - -  
LDH 85 - 241 U/L - 202 - 218 - 225 -  
CEA ng/mL - - - - - - - Some recent data might be hidden Lab Results Component Value Date/Time TSH 2.30 2019 03:29 AM  
 TSH 2.40 10/25/2019 07:39 PM  
 TSH 2.45 2019 04:16 AM  
 
 
ALLERGY: 
No Known Allergies CURRENT MEDICATIONS: 
Current Facility-Administered Medications Medication Dose Route Frequency  bumetanide (BUMEX) tablet 1 mg  1 mg Oral DAILY  warfarin (COUMADIN) tablet 3 mg  3 mg Oral ONCE  
 levETIRAcetam (KEPPRA) tablet 125 mg  125 mg Oral BID  dronabinol (MARINOL) capsule 2.5 mg  2.5 mg Oral DAILY  senna-docusate (PERICOLACE) 8.6-50 mg per tablet 1 Tab  1 Tab Oral BID  polyethylene glycol (MIRALAX) packet 17 g  17 g Oral DAILY  albuterol-ipratropium (DUO-NEB) 2.5 MG-0.5 MG/3 ML  3 mL Nebulization Q6H PRN  therapeutic multivitamin (THERAGRAN) tablet 1 Tab  1 Tab Oral DAILY  escitalopram oxalate (LEXAPRO) tablet 10 mg  10 mg Oral QPM  
 potassium chloride SR (KLOR-CON 10) tablet 40 mEq  40 mEq Oral DAILY  alteplase (CATHFLO) 1 mg in sterile water (preservative free) 1 mL injection  1 mg InterCATHeter PRN  finasteride (PROSCAR) tablet 5 mg  5 mg Oral DAILY  spironolactone (ALDACTONE) tablet 25 mg  25 mg Oral DAILY  clonazePAM (KlonoPIN) tablet 0.5 mg  0.5 mg Oral TID PRN  
 milrinone (PRIMACOR) 20 MG/100 ML D5W infusion  0.2 mcg/kg/min IntraVENous CONTINUOUS  
 sildenafil (pulm.hypertension) (REVATIO) tablet 40 mg  40 mg Oral TID  magnesium oxide (MAG-OX) tablet 400 mg  400 mg Oral BID  diphenhydrAMINE (BENADRYL) capsule 25 mg  25 mg Oral QHS PRN  
 octreotide (SANDOSTATIN) injection 25 mcg  25 mcg SubCUTAneous TID  benzocaine-menthol (CEPACOL) lozenge 1 Lozenge  1 Lozenge Mucous Membrane PRN  
 digoxin (LANOXIN) tablet 0.0625 mg  62.5 mcg Oral Q MON, WED & FRI  pantoprazole (PROTONIX) tablet 40 mg  40 mg Oral ACB  allopurinol (ZYLOPRIM) tablet 100 mg  100 mg Oral DAILY  arformoterol (BROVANA) neb solution 15 mcg  15 mcg Nebulization BID RT And  
 budesonide (PULMICORT) 500 mcg/2 ml nebulizer suspension  500 mcg Nebulization BID RT  
 artificial saliva (MOUTH KOTE) 1 Spray  1 Spray Oral PRN  
 lactobac ac& pc-s.therm-b.anim (TIAN Q/RISAQUAD)  1 Cap Oral DAILY  oxyCODONE IR (ROXICODONE) tablet 5 mg  5 mg Oral Q6H PRN  
 balsam peru-castor oil (VENELEX) ointment   Topical TID  tamsulosin (FLOMAX) capsule 0.4 mg  0.4 mg Oral DAILY  insulin lispro (HUMALOG) injection   SubCUTAneous AC&HS  Warfarin MD/NP dosing   Other PRN  
 epoetin yvonne-epbx (RETACRIT) injection 20,000 Units  20,000 Units SubCUTAneous Q7D  
 sodium chloride (NS) flush 5-40 mL  5-40 mL IntraVENous Q8H  
 sodium chloride (NS) flush 5-40 mL  5-40 mL IntraVENous PRN  
 acetaminophen (TYLENOL) tablet 650 mg  650 mg Oral Q6H PRN  
 naloxone (NARCAN) injection 0.4 mg  0.4 mg IntraVENous PRN  
 ondansetron (ZOFRAN) injection 4 mg  4 mg IntraVENous Q4H PRN  
 bisacodyl (DULCOLAX) tablet 5 mg  5 mg Oral DAILY PRN  
 sucralfate (CARAFATE) tablet 1 g  1 g Oral AC&HS  influenza vaccine 2019-20 (6 mos+)(PF) (FLUARIX/FLULAVAL/FLUZONE QUAD) injection 0.5 mL  0.5 mL IntraMUSCular PRIOR TO DISCHARGE  hydrALAZINE (APRESOLINE) 20 mg/mL injection 20 mg  20 mg IntraVENous Q6H PRN  
 dextrose 10 % infusion 125-250 mL  125-250 mL IntraVENous PRN Thank you for allowing me to participate in this patient's care. TIERRA Fowler [address] [place] [address], Suite [facility] Phone: [phone] Fax: [phone] [facility] ATTENDING ADDENDUM Patient was seen and examined in person. Data and notes were reviewed. I have discussed and agree with the plan as noted in the NP note above without further additions. Melody Walters MD PhD 
Calos Rueda [address] [place] [address]

## 2019-12-24 NOTE — PROGRESS NOTES
[facility] VAD/Heart Failure Progress Note Admit Date: 10/25/2019 POD:  29 Days Post-Op Procedure:  Procedure(s): LEFT BRACHIAL THROMBECTOMY Subjective:  
Mild dyspnea, fatigue, and weakness; drowsy at times; decreasing Keppra Objective:  
Vitals: 
Blood pressure 91/72, pulse 78, temperature 98.2 °F (36.8 °C), resp. rate 18, height 6' 2\" (1.88 m), weight 175 lb 4.3 oz (79.5 kg), SpO2 97 %. Temp (24hrs), Av.7 °F (36.5 °C), Min:97.4 °F (36.3 °C), Max:98.2 °F (36.8 °C) Hemodynamics: 
 CO: CO (l/min): 5.8 l/min CI: CI (l/min/m2): 2.8 l/min/m2 CVP: CVP (mmHg): 4 mmHg (19 1645) SVR: SVR (dyne*sec)/cm5: 1080 (dyne*sec)/cm5 (19 1200) PAP Systolic: PAP Systolic: 34 (94/07/28 6264) PAP Diastolic: PAP Diastolic: 13 (61/15/60 2306) PVR:   
 SV02: SVO2 (%): 67 % (19 1300) SCV02: SCVO2 (%): 75 % (10/29/19 1900) VAD Interrogation: LVAD (Heartmate) Pump Speed (RPM): 6400 Pump Flow (LPM): 6.1 PI (Pulsitility Index): 2.2 Power: 5.7 MAP: 80  Test: Yes 
Back Up  at Bedside & Labeled: Yes Power Module Test: Yes Driveline Site Care: No 
Driveline Dressing: Clean, Dry, and Intact EKG/Rhythm:   
 
Extubation Date / Time:  
 
CT Output:  
 
Ventilator: 
Ventilator Volumes Vt Set (ml): 550 ml (19 08) Vt Exhaled (Machine Breath) (ml): 639 ml (19 08) Vt Spont (ml): 437 ml (19 1035) Ve Observed (l/min): 7.25 l/min (19 1035) Oxygen Therapy: 
Oxygen Therapy O2 Sat (%): 97 % (19 1131) Pulse via Oximetry: 79 beats per minute (19) O2 Device: Nasal cannula (19 0753) O2 Flow Rate (L/min): 1 l/min (19 0753) FIO2 (%): 21 % (19 08) CXR: 
 
Admission Weight: Last Weight Weight: 192 lb 10.9 oz (87.4 kg) Weight: 175 lb 4.3 oz (79.5 kg) Intake / Output / Drain: 
Current Shift: 701 - 1900 In: 6119.4 [P.O.:100; I.V.:19.4] Out: 4350 Last 24 hrs.:  
 
Intake/Output Summary (Last 24 hours) at 12/24/2019 1145 Last data filed at 12/24/2019 1048 Gross per 24 hour Intake 82273.83 ml Output 51005 ml Net -6471.17 ml No results for input(s): CPK, CKMB, TROIQ in the last 72 hours. Recent Labs  
  12/24/19 
0123 12/23/19 
1725 12/23/19 
0247  12/22/19 
0405 *  --  133*  --  134* K 4.6  --  3.9  --  3.5 CO2 36*  --  36*  --  37* BUN 34*  --  33*  --  32* CREA 1.80*  --  1.79*  --  1.68* *  --  113*  --  104* MG 2.2  --  2.0  --  2.0 WBC 4.4  --  4.9  --  4.7 HGB 7.8* 7.7* 7.8*   < > 7.7* HCT 25.5* 24.7* 25.2*   < > 25.0*  
  --  186  --  178  
 < > = values in this interval not displayed. Recent Labs  
  12/24/19 
0123 12/23/19 
0247 12/22/19 
0405 INR 1.8* 1.5* 1.2* PTP 18.1* 15.3* 12.5* APTT 32.1* 31.3 29.6 No lab exists for component: PBNP Current Facility-Administered Medications:  
  levETIRAcetam (KEPPRA) tablet 125 mg, 125 mg, Oral, BID, Polliard, Alix Knight NP, 125 mg at 12/24/19 2294   dronabinol (MARINOL) capsule 2.5 mg, 2.5 mg, Oral, DAILY, Erna Herrera NP 
  senna-docusate (PERICOLACE) 8.6-50 mg per tablet 1 Tab, 1 Tab, Oral, BID, Alix Herrera NP, 1 Tab at 12/24/19 1293   polyethylene glycol (MIRALAX) packet 17 g, 17 g, Oral, DAILY, Erna Herrera NP, 17 g at 12/23/19 5782   albuterol-ipratropium (DUO-NEB) 2.5 MG-0.5 MG/3 ML, 3 mL, Nebulization, Q6H PRN, Rian Andrews MD 
  therapeutic multivitamin [facility]) tablet 1 Tab, 1 Tab, Oral, DAILY, Levi, Shana B, NP, 1 Tab at 12/24/19 4897   escitalopram oxalate (LEXAPRO) tablet 10 mg, 10 mg, Oral, QPM, Mounika Altman MD, 10 mg at 12/23/19 1843   potassium chloride SR (KLOR-CON 10) tablet 40 mEq, 40 mEq, Oral, DAILY, Levi Shana B, NP, 40 mEq at 12/24/19 0840 
  alteplase (CATHFLO) 1 mg in sterile water (preservative free) 1 mL injection, 1 mg, InterCATHeter, PRN, Margurite Co, Mounika QUEZADA MD, 1 mg at 12/18/19 2355   finasteride (PROSCAR) tablet 5 mg, 5 mg, Oral, DAILY, Yesi Brown MD, 5 mg at 12/24/19 4060   spironolactone (ALDACTONE) tablet 25 mg, 25 mg, Oral, DAILY, Mounika Altman MD, 25 mg at 12/24/19 5696   clonazePAM (KlonoPIN) tablet 0.5 mg, 0.5 mg, Oral, TID PRN, Mounika Altman MD, 0.5 mg at 12/23/19 0700 
  milrinone (PRIMACOR) 20 MG/100 ML D5W infusion, 0.2 mcg/kg/min, IntraVENous, CONTINUOUS, Shana Sims NP, Last Rate: 5 mL/hr at 12/24/19 0543, 0.2 mcg/kg/min at 12/24/19 0543   sildenafil (pulm.hypertension) (REVATIO) tablet 40 mg, 40 mg, Oral, TID, Mounika Altman MD, 40 mg at 12/24/19 1617   magnesium oxide (MAG-OX) tablet 400 mg, 400 mg, Oral, BID, Win Herrera Sill, NP, 400 mg at 12/24/19 5356   diphenhydrAMINE (BENADRYL) capsule 25 mg, 25 mg, Oral, QHS PRN, [place] Herrera Gordillo T, NP, 25 mg at 12/23/19 2315   octreotide (SANDOSTATIN) injection 25 mcg, 25 mcg, SubCUTAneous, TID, [place] Herrera Gordillo T, NP, 25 mcg at 12/24/19 0840 
  benzocaine-menthol (CEPACOL) lozenge 1 Lozenge, 1 Lozenge, Mucous Membrane, PRN, Win Herrera Sill, NP 
  digoxin (LANOXIN) tablet 0.0625 mg, 62.5 mcg, Oral, Q MON, WED & FRI, [place] Herrera Gordillo T, NP, 0.0625 mg at 12/23/19 2315   pantoprazole (PROTONIX) tablet 40 mg, 40 mg, Oral, ACB, [place] Herrera T, NP, 40 mg at 12/24/19 5928   allopurinol (ZYLOPRIM) tablet 100 mg, 100 mg, Oral, DAILY, [place] Herrera T, NP, 100 mg at 12/24/19 5079   arformoterol (BROVANA) neb solution 15 mcg, 15 mcg, Nebulization, BID RT, 15 mcg at 12/24/19 0800 **AND** budesonide (PULMICORT) 500 mcg/2 ml nebulizer suspension, 500 mcg, Nebulization, BID RT, [place] Herrera T, NP, 500 mcg at 12/24/19 0801 
  artificial saliva (MOUTH KOTE) 1 Spray, 1 Spray, Oral, PRN, Sharon, Win Sill, NP, 1 Spray at 12/12/19 1452   lactobac ac& pc-s.therm-b.anim (TIAN Q/RISAQUAD), 1 Cap, Oral, DAILY, Camryn Geronimo MD, 1 Cap at 12/24/19 0840 
  oxyCODONE IR (ROXICODONE) tablet 5 mg, 5 mg, Oral, Q6H PRN, Sherry Arteaga MD, 5 mg at 12/22/19 0229 
  balsam peru-castor oil (VENELEX) ointment, , Topical, TID, Sonny Andrews MD 
  [facility]) capsule 0.4 mg, 0.4 mg, Oral, DAILY, Logan Kincaid MD, 0.4 mg at 12/24/19 0612   insulin lispro (HUMALOG) injection, , SubCUTAneous, AC&HS, Shana Sims NP, 2 Units at 12/23/19 1323   Warfarin MD/NP dosing, , Other, PRN, Mounika Altman MD 
  epoetin yvonne-epbx (RETACRIT) injection 20,000 Units, 20,000 Units, SubCUTAneous, Q7D, Logan Kincaid MD, 20,000 Units at 12/24/19 0839 
  sodium chloride (NS) flush 5-40 mL, 5-40 mL, IntraVENous, Q8H, Sherry Arteaga MD, Stopped at 12/24/19 0600 
  sodium chloride (NS) flush 5-40 mL, 5-40 mL, IntraVENous, PRN, Sherry Arteaga MD, 40 mL at 12/17/19 4425   acetaminophen (TYLENOL) tablet 650 mg, 650 mg, Oral, Q6H PRN, Sherry Arteaga MD, 650 mg at 12/19/19 0721 
  naloxone [facility]) injection 0.4 mg, 0.4 mg, IntraVENous, PRN, Sherry Arteaga MD 
  ondansetron [facility]) injection 4 mg, 4 mg, IntraVENous, Q4H PRN, Sherry Arteaga MD, 4 mg at 12/18/19 1302   bisacodyl (DULCOLAX) tablet 5 mg, 5 mg, Oral, DAILY PRN, Sherry Arteaga MD, 5 mg at 12/22/19 1533 
  sucralfate (CARAFATE) tablet 1 g, 1 g, Oral, AC&HS, Sherry Arteaga MD, 1 g at 12/23/19 231   influenza vaccine 2019-20 (6 mos+)(PF) (FLUARIX/FLULAVAL/FLUZONE QUAD) injection 0.5 mL, 0.5 mL, IntraMUSCular, PRIOR TO DISCHARGE, Mounika Altman MD 
  hydrALAZINE (APRESOLINE) 20 mg/mL injection 20 mg, 20 mg, IntraVENous, Q6H PRN, Shana Sims, NP, 20 mg at 12/10/19 0541 
  dextrose 10 % infusion 125-250 mL, 125-250 mL, IntraVENous, PRN, Luz Maria Arguello MD, Stopped at 11/18/19 0700 
 
  
A/P 
  
S/P LVAD - good flows Need for anti-coagulation - coumadin DM - insulin RV dysfunction - milrinone, diuretics  
   
Risk of morbidity and mortality - high Medical decision making - high complexity 
  
 
Signed By: Georgia Pugh MD

## 2019-12-24 NOTE — PROGRESS NOTES
0730: Bedside shift change report given to Alex Dominguez (oncoming nurse) by Louann Becker (offgoing nurse). Report included the following information SBAR and Kardex. LVAD dressing changed with sterile procedure. Patients bottom part of sternotomy opening with minimal drainage. Recovered and will notify surgeon in am.  
 
1930: Bedside shift change report given to Kaiser Hayward (oncoming nurse) by Thu Todd RN (offgoing nurse). Report included the following information SBAR and Kardex. Problem: Falls - Risk of 
Goal: *Absence of Falls Description Document Jessica Goncalves Fall Risk and appropriate interventions in the flowsheet. Outcome: Progressing Towards Goal 
Note: Fall Risk Interventions: 
Mobility Interventions: Communicate number of staff needed for ambulation/transfer Mentation Interventions: Door open when patient unattended Medication Interventions: Evaluate medications/consider consulting pharmacy, Patient to call before getting OOB Elimination Interventions: Call light in reach History of Falls Interventions: Door open when patient unattended Problem: Pain Goal: *Control of Pain Outcome: Progressing Towards Goal 
  
Problem: Heart Failure: Discharge Outcomes Goal: *Demonstrates ability to perform prescribed activity without shortness of breath or discomfort Outcome: Progressing Towards Goal

## 2019-12-24 NOTE — PROGRESS NOTES
[facility] 
 [address] [address], [address] [facility] Phone: [phone]   [phone]   
  
Nephrology Progress Note Sona Kiser     1950     [id number] Date of Admission : 10/25/2019 
12/24/19 CC:  Follow up for JUAN J, CKD, Hyponatremia Assessment and Plan JUAN J on CKD: 
- stable - to remain off diuretics today Hyponatremia : 
- 2/2 CHF vs fluid overload / water retention from CBI  
- Na at 134 today Gross hematuria, BPH w/ retention: 
- off CBI pre VAD. cysto pre op showed catheter related Cystitis @ postr wall  
- CBI for recurrent hematuria  
- ? Needs Bladder Fulguration - per urology Hypokalemia  
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  
- unable to get CTA/MRA 
- On Keppra Acute on Chronic HFrEF  
- EF 16-20%, NYHA Class IV , hx of VF arrest - s/p AICD 
- Impella insertion 10/29- removed 11/18  
- s/p LVAD placement on 11/18 
- s/p right thoracentesis. CT in place Hoarseness, vocal cord paralysis, mediastinal adenopathy: 
- will need eventual PET/CT 
  
L arm clot s/p thrombectomy on 11/25 JOSE on CPAP  
  
PAF s/p DCCV 6/19 
  
Anemia: 
- from blood loss s/p multiple blood products - s/p IV iron,  Repeat iron studies ok 
- on PAVEL q7 d Serratia and Enteroccocus UTI: 
- completed abx Interval History:   
Seen and examined Urine is almost clear D/w Dr Mitch Ham He looks very good today Denies any N/V/CP/SOB Review of Systems: as per HPI Current Medications:  
Current Facility-Administered Medications Medication Dose Route Frequency  levETIRAcetam (KEPPRA) tablet 125 mg  125 mg Oral BID  dronabinol (MARINOL) capsule 2.5 mg  2.5 mg Oral DAILY  senna-docusate (PERICOLACE) 8.6-50 mg per tablet 1 Tab  1 Tab Oral BID  polyethylene glycol (MIRALAX) packet 17 g  17 g Oral DAILY  albuterol-ipratropium (DUO-NEB) 2.5 MG-0.5 MG/3 ML  3 mL Nebulization Q6H PRN  
  therapeutic multivitamin (THERAGRAN) tablet 1 Tab  1 Tab Oral DAILY  escitalopram oxalate (LEXAPRO) tablet 10 mg  10 mg Oral QPM  
 potassium chloride SR (KLOR-CON 10) tablet 40 mEq  40 mEq Oral DAILY  alteplase (CATHFLO) 1 mg in sterile water (preservative free) 1 mL injection  1 mg InterCATHeter PRN  finasteride (PROSCAR) tablet 5 mg  5 mg Oral DAILY  spironolactone (ALDACTONE) tablet 25 mg  25 mg Oral DAILY  clonazePAM (KlonoPIN) tablet 0.5 mg  0.5 mg Oral TID PRN  
 milrinone (PRIMACOR) 20 MG/100 ML D5W infusion  0.2 mcg/kg/min IntraVENous CONTINUOUS  
 sildenafil (pulm.hypertension) (REVATIO) tablet 40 mg  40 mg Oral TID  magnesium oxide (MAG-OX) tablet 400 mg  400 mg Oral BID  diphenhydrAMINE (BENADRYL) capsule 25 mg  25 mg Oral QHS PRN  
 octreotide (SANDOSTATIN) injection 25 mcg  25 mcg SubCUTAneous TID  benzocaine-menthol (CEPACOL) lozenge 1 Lozenge  1 Lozenge Mucous Membrane PRN  
 digoxin (LANOXIN) tablet 0.0625 mg  62.5 mcg Oral Q MON, WED & FRI  pantoprazole (PROTONIX) tablet 40 mg  40 mg Oral ACB  allopurinol (ZYLOPRIM) tablet 100 mg  100 mg Oral DAILY  arformoterol (BROVANA) neb solution 15 mcg  15 mcg Nebulization BID RT And  
 budesonide (PULMICORT) 500 mcg/2 ml nebulizer suspension  500 mcg Nebulization BID RT  
 artificial saliva (MOUTH KOTE) 1 Spray  1 Spray Oral PRN  
 lactobac ac& pc-s.therm-b.anim (TIAN Q/RISAQUAD)  1 Cap Oral DAILY  oxyCODONE IR (ROXICODONE) tablet 5 mg  5 mg Oral Q6H PRN  
 balsam peru-castor oil (VENELEX) ointment   Topical TID  tamsulosin (FLOMAX) capsule 0.4 mg  0.4 mg Oral DAILY  insulin lispro (HUMALOG) injection   SubCUTAneous AC&HS  Warfarin MD/NP dosing   Other PRN  
 epoetin yvonne-epbx (RETACRIT) injection 20,000 Units  20,000 Units SubCUTAneous Q7D  
 sodium chloride (NS) flush 5-40 mL  5-40 mL IntraVENous Q8H  
 sodium chloride (NS) flush 5-40 mL  5-40 mL IntraVENous PRN  
  acetaminophen (TYLENOL) tablet 650 mg  650 mg Oral Q6H PRN  
 naloxone (NARCAN) injection 0.4 mg  0.4 mg IntraVENous PRN  
 ondansetron (ZOFRAN) injection 4 mg  4 mg IntraVENous Q4H PRN  
 bisacodyl (DULCOLAX) tablet 5 mg  5 mg Oral DAILY PRN  
 sucralfate (CARAFATE) tablet 1 g  1 g Oral AC&HS  influenza vaccine 2019-20 (6 mos+)(PF) (FLUARIX/FLULAVAL/FLUZONE QUAD) injection 0.5 mL  0.5 mL IntraMUSCular PRIOR TO DISCHARGE  hydrALAZINE (APRESOLINE) 20 mg/mL injection 20 mg  20 mg IntraVENous Q6H PRN  
 dextrose 10 % infusion 125-250 mL  125-250 mL IntraVENous PRN No Known Allergies Objective: 
Vitals:   
Vitals:  
 12/23/19 2015 12/23/19 2300 12/24/19 0345 12/24/19 7607 BP:      
Pulse: 75 81 75 78 Resp:    18 Temp: 98 °F (36.7 °C) 97.6 °F (36.4 °C) 97.7 °F (36.5 °C) 97.6 °F (36.4 °C) SpO2: 100% 100% 97% 95% Weight:    79.5 kg (175 lb 4.3 oz) Height:      
 
Intake and Output: 
12/24 0701 - 12/24 1900 In: 6000 Out: 4800  
12/22 1901 - 12/24 0700 In: 51729.1 [P.O.:600; I.V.:415.1] Out: 57069 Physical Examination: 
 
General: Elderly man in no acute distress HEENT:           [place]:  No lines Resp:  Rales + CV:  VAD sounds; No LE edema GI:  Soft and non-tender; no distension Neurologic:  Alert and appropriate; normal speech :  + neal; Hematuria- CLEARING [x]    High complexity decision making was performed 
[x]    Patient is at high-risk of decompensation with multiple organ involvement Lab Data Personally Reviewed: I have reviewed all the pertinent labs, microbiology data and radiology studies during assessment. Recent Labs  
  12/24/19 
0123 12/23/19 
0247 12/22/19 
0405 * 133* 134* K 4.6 3.9 3.5 CL 94* 93* 94* CO2 36* 36* 37* * 113* 104* BUN 34* 33* 32* CREA 1.80* 1.79* 1.68* CA 9.0 9.1 9.1 MG 2.2 2.0 2.0 ALB 2.7* 2.5* 2.5* SGOT 26 26 25 ALT 18 18 16 INR 1.8* 1.5* 1.2* Recent Labs  
  12/24/19 9091 12/23/19 
1725 12/23/19 
0247 12/22/19 
1219 12/22/19 
0405 WBC 4.4  --  4.9  --  4.7 HGB 7.8* 7.7* 7.8* 8.2* 7.7* HCT 25.5* 24.7* 25.2* 26.2* 25.0*  
  --  186  --  178 No results found for: SDES Lab Results Component Value Date/Time Culture result: NO GROWTH 5 DAYS 12/15/2019 03:37 PM  
 Culture result: NO GROWTH 1 DAY 12/15/2019 03:30 PM  
 Culture result: NO GROWTH 5 DAYS 12/06/2019 11:23 PM  
 Culture result: HEAVY ENTEROCOCCUS SPECIES NOTED (A) 11/27/2019 11:10 AM  
 Culture result: LIGHT YEAST (A) 11/27/2019 11:10 AM  
 
Recent Results (from the past 24 hour(s)) GLUCOSE, POC Collection Time: 12/23/19 12:19 PM  
Result Value Ref Range Glucose (POC) 151 (H) 65 - 100 mg/dL Performed by [employer] POC G3 - PUL Collection Time: 12/23/19  4:59 PM  
Result Value Ref Range FIO2 (POC) 28 % pH (POC) 7.493 (H) 7.35 - 7.45    
 pCO2 (POC) 49.7 (H) 35.0 - 45.0 MMHG  
 pO2 (POC) 88 80 - 100 MMHG  
 HCO3 (POC) 38.2 (H) 22 - 26 MMOL/L  
 sO2 (POC) 97 92 - 97 % Base excess (POC) 15 mmol/L Site RIGHT BRACHIAL Device: CPAP Allens test (POC) YES Specimen type (POC) ARTERIAL Total resp. rate 12 GLUCOSE, POC Collection Time: 12/23/19  5:04 PM  
Result Value Ref Range Glucose (POC) 126 (H) 65 - 100 mg/dL Performed by [employer] HGB & HCT Collection Time: 12/23/19  5:25 PM  
Result Value Ref Range HGB 7.7 (L) 12.1 - 17.0 g/dL HCT 24.7 (L) 36.6 - 50.3 % AMMONIA Collection Time: 12/23/19  5:25 PM  
Result Value Ref Range Ammonia 15 <32 UMOL/L  
GLUCOSE, POC Collection Time: 12/23/19 11:18 PM  
Result Value Ref Range Glucose (POC) 136 (H) 65 - 100 mg/dL Performed by Daniel Thomas METABOLIC PANEL, COMPREHENSIVE Collection Time: 12/24/19  1:23 AM  
Result Value Ref Range Sodium 134 (L) 136 - 145 mmol/L Potassium 4.6 3.5 - 5.1 mmol/L  Chloride 94 (L) 97 - 108 mmol/L  
 CO2 36 (H) 21 - 32 mmol/L  
 Anion gap 4 (L) 5 - 15 mmol/L Glucose 118 (H) 65 - 100 mg/dL BUN 34 (H) 6 - 20 MG/DL Creatinine 1.80 (H) 0.70 - 1.30 MG/DL  
 BUN/Creatinine ratio 19 12 - 20 GFR est AA 46 (L) >60 ml/min/1.73m2 GFR est non-AA 38 (L) >60 ml/min/1.73m2 Calcium 9.0 8.5 - 10.1 MG/DL Bilirubin, total 0.6 0.2 - 1.0 MG/DL  
 ALT (SGPT) 18 12 - 78 U/L  
 AST (SGOT) 26 15 - 37 U/L Alk. phosphatase 121 (H) 45 - 117 U/L Protein, total 6.9 6.4 - 8.2 g/dL Albumin 2.7 (L) 3.5 - 5.0 g/dL Globulin 4.2 (H) 2.0 - 4.0 g/dL A-G Ratio 0.6 (L) 1.1 - 2.2 MAGNESIUM Collection Time: 12/24/19  1:23 AM  
Result Value Ref Range Magnesium 2.2 1.6 - 2.4 mg/dL CBC W/O DIFF Collection Time: 12/24/19  1:23 AM  
Result Value Ref Range WBC 4.4 4.1 - 11.1 K/uL  
 RBC 2.70 (L) 4.10 - 5.70 M/uL HGB 7.8 (L) 12.1 - 17.0 g/dL HCT 25.5 (L) 36.6 - 50.3 % MCV 94.4 80.0 - 99.0 FL  
 MCH 28.9 26.0 - 34.0 PG  
 MCHC 30.6 30.0 - 36.5 g/dL  
 RDW 17.8 (H) 11.5 - 14.5 % PLATELET 768 957 - 009 K/uL MPV 9.5 8.9 - 12.9 FL  
 NRBC 0.0 0  WBC ABSOLUTE NRBC 0.00 0.00 - 0.01 K/uL PROTHROMBIN TIME + INR Collection Time: 12/24/19  1:23 AM  
Result Value Ref Range INR 1.8 (H) 0.9 - 1.1 Prothrombin time 18.1 (H) 9.0 - 11.1 sec PTT Collection Time: 12/24/19  1:23 AM  
Result Value Ref Range aPTT 32.1 (H) 22.1 - 32.0 sec  
 aPTT, therapeutic range     58.0 - 77.0 SECS  
NT-PRO BNP Collection Time: 12/24/19  1:23 AM  
Result Value Ref Range NT pro-BNP 1,747 (H) <125 PG/ML  
DIGOXIN Collection Time: 12/24/19  1:23 AM  
Result Value Ref Range Digoxin level 0.9 0.90 - 2.00 NG/ML  
LACTIC ACID Collection Time: 12/24/19  1:23 AM  
Result Value Ref Range Lactic acid 1.1 0.4 - 2.0 MMOL/L  
PROCALCITONIN Collection Time: 12/24/19  1:23 AM  
Result Value Ref Range Procalcitonin 0.08 ng/mL LD Collection Time: 12/24/19  1:23 AM  
Result Value Ref Range   85 - 241 U/L  
 GLUCOSE, POC Collection Time: 12/24/19  7:06 AM  
Result Value Ref Range Glucose (POC) 116 (H) 65 - 100 mg/dL Performed by Aura Irizarry MD

## 2019-12-25 NOTE — PROGRESS NOTES
Ordered Venofer and iNCREASED epo dose Stable from renal stand point Will see him later today Melany Ward MD 
[facility] Office :[phone] Fax: [phone]

## 2019-12-25 NOTE — PROGRESS NOTES
Problem: Falls - Risk of 
Goal: *Absence of Falls Description Document Wally Españameena Fall Risk and appropriate interventions in the flowsheet. Outcome: Progressing Towards Goal 
Note: Fall Risk Interventions: 
Mobility Interventions: Communicate number of staff needed for ambulation/transfer, OT consult for ADLs, Patient to call before getting OOB, PT Consult for mobility concerns, PT Consult for assist device competence, Utilize gait belt for transfers/ambulation Mentation Interventions: Adequate sleep, hydration, pain control Medication Interventions: Evaluate medications/consider consulting pharmacy Elimination Interventions: Call light in reach, Patient to call for help with toileting needs History of Falls Interventions: Evaluate medications/consider consulting pharmacy Problem: Pain Goal: *Control of Pain Outcome: Progressing Towards Goal 
  
Problem: Pressure Injury - Risk of 
Goal: *Prevention of pressure injury Description Document Mich Scale and appropriate interventions in the flowsheet. Outcome: Progressing Towards Goal 
Note: Pressure Injury Interventions: 
Sensory Interventions: Assess changes in LOC Moisture Interventions: Absorbent underpads Activity Interventions: Increase time out of bed, Pressure redistribution bed/mattress(bed type), PT/OT evaluation Mobility Interventions: Float heels, HOB 30 degrees or less, Pressure redistribution bed/mattress (bed type), PT/OT evaluation Nutrition Interventions: Document food/fluid/supplement intake Friction and Shear Interventions: HOB 30 degrees or less, Lift sheet Problem: Heart Failure: Discharge Outcomes Goal: *Demonstrates ability to perform prescribed activity without shortness of breath or discomfort Outcome: Progressing Towards Goal 
Goal: *Verbalizes understanding and describes prescribed diet Outcome: Progressing Towards Goal 
Goal: *Describes smoking cessation resources Outcome: Progressing Towards Goal 
Goal: *Describes importance of continuing daily weights and changes to report to physician Outcome: Progressing Towards Goal 
  
Problem: Diabetes Self-Management Goal: *Disease process and treatment process Description Define diabetes and identify own type of diabetes; list 3 options for treating diabetes. Outcome: Progressing Towards Goal 
Goal: *Incorporating nutritional management into lifestyle Description Describe effect of type, amount and timing of food on blood glucose; list 3 methods for planning meals. Outcome: Progressing Towards Goal 
Goal: *Incorporating physical activity into lifestyle Description State effect of exercise on blood glucose levels. Outcome: Progressing Towards Goal 
Goal: *Monitoring blood glucose, interpreting and using results Description Identify recommended blood glucose targets  and personal targets. Outcome: Progressing Towards Goal 
  
Problem: Nutrition Deficit Goal: *Optimize nutritional status Outcome: Progressing Towards Goal 
  
Problem: Infection - Risk of, Urinary Catheter-Associated Urinary Tract Infection Goal: *Absence of infection signs and symptoms Outcome: Progressing Towards Goal 
  
Problem: LVAD Post-op Day 15 to Discharge Goal: Off Pathway (Use only if patient is Off Pathway) Outcome: Progressing Towards Goal 
Goal: Activity/Safety Outcome: Progressing Towards Goal 
Goal: Consults, if ordered Outcome: Progressing Towards Goal 
Goal: Diagnostic Test/Procedures Outcome: Progressing Towards Goal 
Goal: Nutrition/Diet Outcome: Progressing Towards Goal 
Goal: Medications Outcome: Progressing Towards Goal 
Goal: Discharge Planning Outcome: Progressing Towards Goal 
Goal: Respiratory Outcome: Progressing Towards Goal 
Goal: Treatments/Interventions/Procedures Outcome: Progressing Towards Goal 
Goal: Psychosocial 
Outcome: Progressing Towards Goal

## 2019-12-25 NOTE — PROGRESS NOTES
Bedside and Verbal shift change report given to [facility] (oncoming nurse) by [facility] (offgoing nurse). Report included the following information SBAR, Kardex, Procedure Summary, Intake/Output, Recent Results and Cardiac Rhythm PACED.  
 
 
1200: ORTHOSTATICS Supine: MAP 88 Sitting: MAP 64 (pt dizzy) Standing: MAP 64 (pt dizzy) TIERRA Herrera made aware. H&H sent down to lab. 
 
1300: HGB came back 8.9, TIERRA Herrera made aware. Will continue to monitor. 1700: ORTHOSTATICS Supine: MAP 86 Sitting: MAP 86 
Standin LVAD drive line dressing changed per protocol. Sterile procedure used. Site C/D/I. Some blanchable reddness noted around insertion site. Bedside and Verbal shift change report given to [facility] Darrion Cedeño (oncoming nurse) by [facility] RN (offgoing nurse). Report included the following information SBAR, Kardex, Procedure Summary, Intake/Output, MAR, Recent Results and Cardiac Rhythm PACED.

## 2019-12-25 NOTE — PROGRESS NOTES
[address] [facility] [address] [facility] in [address], [place] Inpatient Progress Note Patient name: 72Douglas Ovalles Patient : 1950 Patient MRN: [id number] Attending MD: Ciera Stevens MD 
Date of service: 19 Chief Complaint:  
Sarah Tadeo azucena LVAD implant 
  
HPI: Sona Kiser is a 71y.o. year old pleasant white male with a history of HTN, HLD, JOSE, CAD s/p cardiac arrest VFib s/p CABG () c/b sternal would infection and sternectomy, ischemic cardiomyopathy LVEF 15-20%, s/p ICD and with LBBB. He was admitted with acute on chronic systolic heart failure with massive volume overload > 20 lbs, in the setting of atrial fibrillation s/p failed DCCV and underwent DCCV and RHC on .  S/p BiVICD on 19 with Dr. Sherie Millan. He was discharged home home on IV milrinone on 19. Stephy Frost has been followed closely by Dr. Michelle Barber and the [facility]. 
  
Mr. Kiser was admitted for acute on chronic systolic heart failure. He underwent implantation of Impella 5.0 due to CS and has completed his LVAD evaluation. Stephy Frost meets criteria for implant of HM3 as DT. He was NYHA Class IV prior to implant of Impella 5.0, has LVEF < 15%, was intolerant of GDMT due to symptomatic hypotension and renal dysfunction. He remains dependent on temporary MCS support. RHC  revealed compensated hemodynamics on Impella. His renal function has improved dramatically with improvement in his hemodynamics. He is not a suitable transplant candidate due to single kidney, sternectomy, debility, and frailty. He was reviewed by Curt Wells and felt to be a high risk heart transplant candidate due to multiple co-morbidities as well as the afore mentioned conditions.  He remained in the CCU and underwent a HM 3 implant as DT on . He was weaned off of pressors and transferred to [place] [address] where he continues to undergo PT/OT and hemodynamic optimization.   
  
24Hr Events Na+ down to 131 Multiple BMs Hgb 7.5 CBI ongoing INR therapeutic Procedure:  Procedure(s): REMOVAL TEMPORARY LEFT VENTRICULAR ASSIST DEVICE, IMPLANTATION OF LEFT VENTRICULAR ASSIST DEVICE PERMANENT (VAD), ECC, JACQUE, EPI AORTIC US BY DR Anju Tiwari.    
POD:  36 
  
Impression / Plan:  
Heart Failure Status: NYHA Class IV Chronic systolic heart failure due to ICM, NYHA Class IV, EF < 15%, cardiogenic shock bridged with Impella 5.0 support to HM 3 implant S/p Impella removal and LVAD implant 11/18/19 RPMs 6400 rpm - frequent PI events TTE with bubble study negative for PFO Continue milrinone 0.2mcg/kg/min Unable to obtain CardioMEMS readings over the weekend - try on Monday Increase Bumex to 1 mg IV BID to follow blood Cont Sildenafil 40 mg PO TID - rx sent to local pharmacy to initiate PA Intolerant of BB due to RV failure Intolerant of ACEi/ARB/ARNI/AA due to JUAN J on CKD 3 Strict I/O, daily weights, Na+ restricted diet Continue LVAD education Remove suture from drive line exit site on 12/26 and decrease dressing change frequency to three times weekly Delayed skin healing on bottom portion of sternotomy - will have CSS PA evaluate 
TTE 12/16- EF 15-20% QTc 374 12/18/19 Orthostatics today Generalized myoclonus Improved Appreciate Neurology input Decreased Keppra due to somnolence - 125 mg po BID Head CT negative for acute process EEG suggestive of mild generalized encephalopathic process, possibly related to underlying structural brain injury vs metabolic abnormality Monitor closely  
  
Anticoagulation for LVAD, INR goal 1.5-2 Goal decreased d/t persistent hematuria No ASA d/t hematuria INR 1.7 Cont coumadin - 4 mg tonight Epistaxis Resolved Afrin soaked guaze ENT consult appreciated Monitor for now  
  
Acute Respiratory Failure post op Improved with aggressive diuresis Off supplemental O2 Pulmonary hygiene Cont home CPAP- must use while sleeping Acute on CKD 3, atrophic left kidney Appreciate Nephrology assistance Watch Cr - improving Daily diuretic dosing - Bumex 1 mg IV BID Avoid nephrotoxins, trend labs Renally dose meds  
  
CAD s/p CABG Off ASA d/t hematuria No BB d/t RV failure Hold statin due to recent hepatic failure LHC performed 11/13 - low likelihood of benefit from revascularization  
  
Hx of VF arrest s/p BiV-ICD No recent shocks Keep K > 4 and Mg > 2  
   
PAF Dig level 0.9 -cont dig (62.5 mcg qMWF) No BB due to RV failure Repeat TFTs WNL 
  
Hx of gout Uric acid WNL Acute blood loss anemia Likely due to hematuria Cont octreotide 25 mcg SQ TID Transfuse 1 unit PRBCs today - hgb 7.5 Fecal occult 12/14 negative, positive on 12/17 Appreciate urology recommendations Cont CBI - ?plans for fulguration  
  
Suspected aspiration pneumonia Sputum culture showing scant growth of yeast 
Cefepime complete Urinary retention, c/b serratia UTI and hematuria Appreciate Urology consult Repeat UA with cx negative 12/15 Watch off abx Cystoscopy performed 11/13 shows catheter induced cystitis May need fulguration in future if CBI unsuccessful Sepsis Alert Paired Blood Neg 
Urine cx negative Sputum cx when able Malnutrition Appreciate Nutritionist consult Prealbumin weekly - 20.1 on 12/23 Diet as tolerated Intolerant of mirtazapine d/t tremors, confusion Decreased Marinol to 2.5 mg PO qPM d/t lethargy Cont MVI 
  
COPD Appreciate Pulmonology assistance Continue nebs PRN   
  
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  
  
Hx of sternal wound infection, sternectomy Sternum closed post op- monitor  
  
Vocal cord paralysis Improved ENT recs appreciated Neck CT- R neck edema, no airway compromise Speech therapy following - appreciate input Anxiety Klonipin 0.5 mg TID prn anxiety Depression Cont lexapro 10 mg qpm 
Monitor QTc - 478 ms on 12/22 Monitor sodium  
  
Debility Continue PT/OT  
  
Ppx Protonix PT/OT Bowel regimen PICC placed 11/22/19  
  
Dispo: Will need IP rehab. Not ready for discharge at this time LVAD INTERROGATION: 
Device interrogated in person Device function normal, normal flow, multiple PI events LVAD Pump Speed (RPM): 6400 Pump Flow (LPM): 5.9 MAP: 72 
PI (Pulsitility Index): 3.6 Power: 5.5  Test: No 
Back Up  at Bedside & Labeled: Yes Power Module Test: No 
Driveline Site Care: No 
Driveline Dressing: Clean, Dry, and Intact Outpatient: No 
MAP in Therapeutic Range (Outpatient): Yes Testing Alarms Reviewed: Yes 
Back up SC speed: 6400 Back up Low Speed Limit: 6000 Emergency Equipment with Patient?: Yes Emergency procedures reviewed?: Yes Drive line site inspected?: No 
Drive line intergrity inspected?: Yes Drive line dressing changed?: No 
 
PHYSICAL EXAM: 
Visit Vitals BP 91/72 (BP 1 Location: Left arm, BP Patient Position: At rest) Pulse 79 Temp 97.8 °F (36.6 °C) Resp 18 Ht 6' 2\" (1.88 m) Wt 175 lb 4.3 oz (79.5 kg) SpO2 95% BMI 22.50 kg/m² Physical Exam  
Constitutional: He is oriented to person, place, and time. He appears well-developed. He appears cachectic. No distress. HENT:  
Head: Normocephalic. Neck: Normal range of motion. Neck supple. No JVD present. Cardiovascular: Normal rate, regular rhythm and normal heart sounds. + VAD hum Pulmonary/Chest: Effort normal and breath sounds normal. No respiratory distress. Abdominal: Soft. Bowel sounds are normal. He exhibits no distension. Genitourinary:    Genitourinary Comments: Hematuria Musculoskeletal: Normal range of motion. General: No edema. Neurological: He is alert and oriented to person, place, and time. Skin: Skin is warm and dry. Nursing note and vitals reviewed.  
 
 
REVIEW OF SYSTEMS: 
 Review of Systems Constitutional: Positive for malaise/fatigue. Negative for chills and fever. HENT: Positive for hearing loss. Negative for nosebleeds. Respiratory: Negative for cough and shortness of breath. Mild dyspnea Cardiovascular: Negative for chest pain, palpitations, orthopnea and leg swelling. Gastrointestinal: Negative for heartburn, nausea and vomiting. Genitourinary: Positive for hematuria. Musculoskeletal: Negative. Neurological: Positive for weakness. Negative for dizziness and headaches. Endo/Heme/Allergies: Bruises/bleeds easily. PAST MEDICAL HISTORY: 
Past Medical History:  
Diagnosis Date  Degenerative disc disease, lumbar  Heart failure ([facility]  High cholesterol  Hypertension  Paroxysmal atrial fibrillation ([facility] 4/2/2019  Spinal stenosis PAST SURGICAL HISTORY: 
Past Surgical History:  
Procedure Laterality Date  COLONOSCOPY Left 11/11/2019 COLONOSCOPY at bedside performed by Mark Briggs MD at [address] [place] [address]  HX CORONARY ARTERY BYPASS GRAFT    
 triple  HX HERNIA REPAIR    
 HX IMPLANTABLE CARDIOVERTER DEFIBRILLATOR  IA CARDIOVERSION ELECTIVE ARRHYTHMIA EXTERNAL N/A 6/10/2019 EP CARDIOVERSION performed by Franklyn Lynch MD at [place] [address], [address] CATH LAB  IA CARDIOVERSION ELECTIVE ARRHYTHMIA EXTERNAL N/A 6/18/2019 EP CARDIOVERSION performed by Chirag Duvall MD at [place] [address], [address] CATH LAB  IA INSJ/RPLCMT PERM DFB W/TRNSVNS LDS 1/DUAL CHMBR N/A 6/21/2019 INSERT ICD BIV MULTI performed by Ruth Emmanuel MD at [place] [address], [address] CATH LAB  IA TCAT IMPL WRLS P-ART PRS SNR L-T HEMODYN MNTR N/A 9/18/2019 IMPLANT HEART FAILURE MONITORING DEVICE performed by Mounika Bernard MD at [place] [address], [address] CATH LAB FAMILY HISTORY: 
Family History Problem Relation Age of Onset  Lung Disease Mother  Hypertension Mother Canseco Arthritis-osteo Mother  Heart Disease Mother  Heart Disease Father  Diabetes Father SOCIAL HISTORY: 
Social History Socioeconomic History  Marital status:  Spouse name: Not on file  Number of children: Not on file  Years of education: Not on file  Highest education level: Not on file Tobacco Use  Smoking status: Former Smoker Last attempt to quit: 2010 Years since quittin.0  Smokeless tobacco: Never Used Substance and Sexual Activity  Alcohol use: Not Currently Comment: rarely  Drug use: Never Other Topics Concern LABORATORY RESULTS: 
  
Labs Latest Ref Rng & Units 2019 WBC 4.1 - 11.1 K/uL 4.1 - 4.4 - 4.9 - 4.7  
RBC 4.10 - 5.70 M/uL 2.52(L) - 2.70(L) - 2.70(L) - 2.66(L) Hemoglobin 12.1 - 17.0 g/dL 7. 5(L) 7. 5(L) 7. 8(L) 7. 7(L) 7. 8(L) 8.2(L) 7. 7(L) Hematocrit 36.6 - 50.3 % 24. 0(L) 24. 8(L) 25. 5(L) 24. 7(L) 25. 2(L) 26. 2(L) 25. 0(L) MCV 80.0 - 99.0 FL 95.2 - 94.4 - 93.3 - 94.0 Platelets 355 - 929 K/uL 179 - 186 - 186 - 178 Lymphocytes 12 - 49 % - - - - - - - Monocytes 5 - 13 % - - - - - - - Eosinophils 0 - 7 % - - - - - - - Basophils 0 - 1 % - - - - - - - Albumin 3.5 - 5.0 g/dL 2. 7(L) - 2. 7(L) - 2. 5(L) - 2. 5(L) Calcium 8.5 - 10.1 MG/DL 8.8 - 9.0 - 9.1 - 9.1 SGOT 15 - 37 U/L 27 - 26 - 26 - 25 Glucose 65 - 100 mg/dL 110(H) - 118(H) - 113(H) - 104(H) BUN 6 - 20 MG/DL 33(H) - 34(H) - 33(H) - 32(H) Creatinine 0.70 - 1.30 MG/DL 1.75(H) - 1.80(H) - 1.79(H) - 1.68(H) Sodium 136 - 145 mmol/L 131(L) - 134(L) - 133(L) - 134(L) Potassium 3.5 - 5.1 mmol/L 4.4 - 4.6 - 3.9 - 3.5 TSH 0.36 - 3.74 uIU/mL - - - - - - -  
LDH 85 - 241 U/L 193 - 202 - 218 - 225 CEA ng/mL - - - - - - - Some recent data might be hidden Lab Results Component Value Date/Time TSH 2.30 2019 03:29 AM  
 TSH 2.40 10/25/2019 07:39 PM  
 TSH 2.45 2019 04:16 AM  
 
 
ALLERGY: 
No Known Allergies CURRENT MEDICATIONS: 
Current Facility-Administered Medications Medication Dose Route Frequency  warfarin (COUMADIN) tablet 4 mg  4 mg Oral ONCE  
 senna-docusate (PERICOLACE) 8.6-50 mg per tablet 1 Tab  1 Tab Oral DAILY  bumetanide (BUMEX) tablet 1 mg  1 mg Oral DAILY  levETIRAcetam (KEPPRA) tablet 125 mg  125 mg Oral BID  dronabinol (MARINOL) capsule 2.5 mg  2.5 mg Oral DAILY  polyethylene glycol (MIRALAX) packet 17 g  17 g Oral DAILY  albuterol-ipratropium (DUO-NEB) 2.5 MG-0.5 MG/3 ML  3 mL Nebulization Q6H PRN  therapeutic multivitamin (THERAGRAN) tablet 1 Tab  1 Tab Oral DAILY  escitalopram oxalate (LEXAPRO) tablet 10 mg  10 mg Oral QPM  
 potassium chloride SR (KLOR-CON 10) tablet 40 mEq  40 mEq Oral DAILY  alteplase (CATHFLO) 1 mg in sterile water (preservative free) 1 mL injection  1 mg InterCATHeter PRN  finasteride (PROSCAR) tablet 5 mg  5 mg Oral DAILY  spironolactone (ALDACTONE) tablet 25 mg  25 mg Oral DAILY  clonazePAM (KlonoPIN) tablet 0.5 mg  0.5 mg Oral TID PRN  
 milrinone (PRIMACOR) 20 MG/100 ML D5W infusion  0.2 mcg/kg/min IntraVENous CONTINUOUS  
 sildenafil (pulm.hypertension) (REVATIO) tablet 40 mg  40 mg Oral TID  magnesium oxide (MAG-OX) tablet 400 mg  400 mg Oral BID  diphenhydrAMINE (BENADRYL) capsule 25 mg  25 mg Oral QHS PRN  
 octreotide (SANDOSTATIN) injection 25 mcg  25 mcg SubCUTAneous TID  benzocaine-menthol (CEPACOL) lozenge 1 Lozenge  1 Lozenge Mucous Membrane PRN  
 digoxin (LANOXIN) tablet 0.0625 mg  62.5 mcg Oral Q MON, WED & FRI  pantoprazole (PROTONIX) tablet 40 mg  40 mg Oral ACB  allopurinol (ZYLOPRIM) tablet 100 mg  100 mg Oral DAILY  arformoterol (BROVANA) neb solution 15 mcg  15 mcg Nebulization BID RT  And  
 budesonide (PULMICORT) 500 mcg/2 ml nebulizer suspension  500 mcg Nebulization BID RT  
 artificial saliva (MOUTH KOTE) 1 Spray  1 Spray Oral PRN  
  lactobac ac& pc-s.therm-b.anim (TIAN Q/RISAQUAD)  1 Cap Oral DAILY  oxyCODONE IR (ROXICODONE) tablet 5 mg  5 mg Oral Q6H PRN  
 balsam peru-castor oil (VENELEX) ointment   Topical TID  tamsulosin (FLOMAX) capsule 0.4 mg  0.4 mg Oral DAILY  insulin lispro (HUMALOG) injection   SubCUTAneous AC&HS  Warfarin MD/NP dosing   Other PRN  
 epoetin yvonne-epbx (RETACRIT) injection 20,000 Units  20,000 Units SubCUTAneous Q7D  
 sodium chloride (NS) flush 5-40 mL  5-40 mL IntraVENous Q8H  
 sodium chloride (NS) flush 5-40 mL  5-40 mL IntraVENous PRN  
 acetaminophen (TYLENOL) tablet 650 mg  650 mg Oral Q6H PRN  
 naloxone (NARCAN) injection 0.4 mg  0.4 mg IntraVENous PRN  
 ondansetron (ZOFRAN) injection 4 mg  4 mg IntraVENous Q4H PRN  
 bisacodyl (DULCOLAX) tablet 5 mg  5 mg Oral DAILY PRN  
 sucralfate (CARAFATE) tablet 1 g  1 g Oral AC&HS  influenza vaccine 2019-20 (6 mos+)(PF) (FLUARIX/FLULAVAL/FLUZONE QUAD) injection 0.5 mL  0.5 mL IntraMUSCular PRIOR TO DISCHARGE  hydrALAZINE (APRESOLINE) 20 mg/mL injection 20 mg  20 mg IntraVENous Q6H PRN  
 dextrose 10 % infusion 125-250 mL  125-250 mL IntraVENous PRN Thank you for allowing me to participate in this patient's care. TIERRA Ye [address] [place] [address], [address] Phone: [phone] Fax: [phone] [facility] ATTENDING ADDENDUM Patient was seen and examined in person. Data and notes were reviewed. I have discussed and agree with the plan as noted in the NP note above without further additions. Joaquin Blackwood MD PhD 
[address]ued[address] [place] [address]

## 2019-12-25 NOTE — PROGRESS NOTES
Urology Progress Note Patient: Nickie Reddy MRN: [id number]  SSN: [id number] YOB: 1950  Age: 71 y.o. Sex: male ADMITTED: 10/25/2019 to Vika Ibarra MD for Acute decompens[facility] [I50.9] POD# 30 Days Post-Op Procedure(s): LEFT BRACHIAL THROMBECTOMY 
F/U re hematuria Urine is completely clear this morning with very slow CBI going. It has been pink on occasion overnight requiring increased CBI. Vitals: Temp (24hrs), Av °F (36.7 °C), Min:97.8 °F (36.6 °C), Max:98.2 °F (36.8 °C) Blood pressure 91/72, pulse 70, temperature 97.8 °F (36.6 °C), resp. rate 18, height 6' 2\" (1.88 m), weight 78.4 kg (172 lb 13.5 oz), SpO2 95 %. Intake and Output: 
 1901 -  0700 In: 61450.4 [P.O.:680; I.V.:160.4] Out: 40037 Abd  - soft Labs: 
Labs:  
Lab Results Component Value Date/Time WBC 4.1 2019 12:56 AM  
 HGB 7.5 (L) 2019 12:56 AM  
 Creatinine 1.75 (H) 2019 12:56 AM  
 
 
 
Assessment/Plan: 
 Mild hematuria - improving. Continue to wean CBI today. Stop CBI @ 6AM tomorrow so I can see how it looks off irrigation. May try to remove neal as catheter may be causing/exacerbating hematuria. Discussed possibility of cysto/fulguration if hematuria continues to be a problem but hope to avoid as it could provoke more bleeding. Signed By: Poly Call MD - 2019

## 2019-12-25 NOTE — PROGRESS NOTES
1930: Bedside and Verbal shift change report given to Donn Izaguirre RN (oncoming nurse) by Claire Falcon RN (offgoing nurse). Report included the following information SBAR, Kardex, Intake/Output, MAR, Recent Results and Cardiac Rhythm Paced Wound to lower sternum covered by dressing. Cleansed and dressed by previous RN. Dressing taken down for assessment. Wound about dime sized, no drainage noted. Wound redressed with silver product, 4x4 and special tape. 5650: After standing on scale this morning, patient sat on the side of bed and had a syncopal episode, briefly losing consciousness. Telemetry showed a brief heart rate 170's with 6 beats of Vtach. Patient assisted back to bed. MAP 84. Patient able to speak after about 1 min. 0645: Patient states that he feels fine and doesn't recall the event. [id number] Del Rio NP at bedside to assess patient. Blood Glucose within normal range, see lab results. PI events noted on LVAD tower. Orders received to complete orthostatic bp assessment today. 0710: 1u PRBCs started. 0730: Bedside and Verbal shift change report given to Elina Shepherd RN (oncoming nurse) by Donn Izaguirre RN (offgoing nurse). Report included the following information SBAR, Kardex, Intake/Output, MAR, Recent Results and Cardiac Rhythm Paced

## 2019-12-25 NOTE — PROGRESS NOTES
[facility] 
 [address] [facility], [address] [facility] Phone: [phone]   [phone]   
  
Nephrology Progress Note Sona Kiser     1950     [id number] Date of Admission : 10/25/2019 
12/25/19 CC:  Follow up for JUAN J, CKD, Hyponatremia Assessment and Plan JUAN J on CKD: 
- stable  
-Resume Bumex 1 mg twice daily Hyponatremia : 
- 2/2 CHF vs fluid overload / water retention from CBI  
- Na at 131 today Gross hematuria, BPH w/ retention: 
- off CBI pre VAD. cysto pre op showed catheter related Cystitis @ postr wall  
- CBI for recurrent hematuria -  per urology Hypokalemia  
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  
- unable to get CTA/MRA 
- On [facility] Acute on Chronic HFrEF  
- EF 16-20%, NYHA Class IV , hx of VF arrest - s/p AICD 
- Impella insertion 10/29- removed 11/18  
- s/p LVAD placement on 11/18 
- s/p right thoracentesis. CT in place Hoarseness, vocal cord paralysis, mediastinal adenopathy: 
- will need eventual PET/CT 
  
L arm clot s/p thrombectomy on 11/25 JOSE on CPAP  
  
PAF s/p DCCV 6/19 
  
Anemia: 
- from blood loss s/p multiple blood products - s/p IV iron,  Repeat iron studies ok 
-Ordered some IV iron and increased Epogen Agree with-RBC transfusion Serratia and Enteroccocus UTI: 
- completed abx Interval History:   
Seen and examined Hemoglobin at 7.5 Urine looks pink-tinged with CBI Denies any N/V/CP/SOB Review of Systems: as per HPI Current Medications:  
Current Facility-Administered Medications Medication Dose Route Frequency  warfarin (COUMADIN) tablet 4 mg  4 mg Oral ONCE  
 senna-docusate (PERICOLACE) 8.6-50 mg per tablet 1 Tab  1 Tab Oral DAILY  bumetanide (BUMEX) injection 1 mg  1 mg IntraVENous BID  epoetin yvonne-epbx (RETACRIT) injection 20,000 Units  20,000 Units SubCUTAneous Q MON, WED & FRI  
  iron sucrose (VENOFER) 300 mg in 0.9% sodium chloride 250 mL IVPB  300 mg IntraVENous Q24H  
 levETIRAcetam (KEPPRA) tablet 125 mg  125 mg Oral BID  dronabinol (MARINOL) capsule 2.5 mg  2.5 mg Oral DAILY  polyethylene glycol (MIRALAX) packet 17 g  17 g Oral DAILY  albuterol-ipratropium (DUO-NEB) 2.5 MG-0.5 MG/3 ML  3 mL Nebulization Q6H PRN  therapeutic multivitamin (THERAGRAN) tablet 1 Tab  1 Tab Oral DAILY  escitalopram oxalate (LEXAPRO) tablet 10 mg  10 mg Oral QPM  
 potassium chloride SR (KLOR-CON 10) tablet 40 mEq  40 mEq Oral DAILY  alteplase (CATHFLO) 1 mg in sterile water (preservative free) 1 mL injection  1 mg InterCATHeter PRN  finasteride (PROSCAR) tablet 5 mg  5 mg Oral DAILY  spironolactone (ALDACTONE) tablet 25 mg  25 mg Oral DAILY  clonazePAM (KlonoPIN) tablet 0.5 mg  0.5 mg Oral TID PRN  
 milrinone (PRIMACOR) 20 MG/100 ML D5W infusion  0.2 mcg/kg/min IntraVENous CONTINUOUS  
 sildenafil (pulm.hypertension) (REVATIO) tablet 40 mg  40 mg Oral TID  magnesium oxide (MAG-OX) tablet 400 mg  400 mg Oral BID  diphenhydrAMINE (BENADRYL) capsule 25 mg  25 mg Oral QHS PRN  
 octreotide (SANDOSTATIN) injection 25 mcg  25 mcg SubCUTAneous TID  benzocaine-menthol (CEPACOL) lozenge 1 Lozenge  1 Lozenge Mucous Membrane PRN  
 digoxin (LANOXIN) tablet 0.0625 mg  62.5 mcg Oral Q MON, WED & FRI  pantoprazole (PROTONIX) tablet 40 mg  40 mg Oral ACB  allopurinol (ZYLOPRIM) tablet 100 mg  100 mg Oral DAILY  arformoterol (BROVANA) neb solution 15 mcg  15 mcg Nebulization BID RT And  
 budesonide (PULMICORT) 500 mcg/2 ml nebulizer suspension  500 mcg Nebulization BID RT  
 artificial saliva (MOUTH KOTE) 1 Spray  1 Spray Oral PRN  
 lactobac ac& pc-s.therm-b.anim (TIAN Q/RISAQUAD)  1 Cap Oral DAILY  oxyCODONE IR (ROXICODONE) tablet 5 mg  5 mg Oral Q6H PRN  
 balsam peru-castor oil (VENELEX) ointment   Topical TID  tamsulosin (FLOMAX) capsule 0.4 mg  0.4 mg Oral DAILY  insulin lispro (HUMALOG) injection   SubCUTAneous AC&HS  Warfarin MD/NP dosing   Other PRN  
 sodium chloride (NS) flush 5-40 mL  5-40 mL IntraVENous Q8H  
 sodium chloride (NS) flush 5-40 mL  5-40 mL IntraVENous PRN  
 acetaminophen (TYLENOL) tablet 650 mg  650 mg Oral Q6H PRN  
 naloxone (NARCAN) injection 0.4 mg  0.4 mg IntraVENous PRN  
 ondansetron (ZOFRAN) injection 4 mg  4 mg IntraVENous Q4H PRN  
 bisacodyl (DULCOLAX) tablet 5 mg  5 mg Oral DAILY PRN  
 sucralfate (CARAFATE) tablet 1 g  1 g Oral AC&HS  influenza vaccine 2019-20 (6 mos+)(PF) (FLUARIX/FLULAVAL/FLUZONE QUAD) injection 0.5 mL  0.5 mL IntraMUSCular PRIOR TO DISCHARGE  hydrALAZINE (APRESOLINE) 20 mg/mL injection 20 mg  20 mg IntraVENous Q6H PRN  
 dextrose 10 % infusion 125-250 mL  125-250 mL IntraVENous PRN No Known Allergies Objective: 
Vitals:   
Vitals:  
 12/25/19 0810 12/25/19 0910 12/25/19 1008 12/25/19 1102 BP:      
Pulse: 74 73 76 78 Resp: 16 16 18 16 Temp: 97.8 °F (36.6 °C) 97.9 °F (36.6 °C) 97.9 °F (36.6 °C) 97.9 °F (36.6 °C) SpO2: 97% 93% 93% 95% Weight:      
Height:      
 
Intake and Output: 
12/25 0701 - 12/25 1900 In: 5986 [P.O.:240; I.V.:304.8] Out: 73261  
12/23 1901 - 12/25 0700 In: 05586.4 [P.O.:680; I.V.:160.4] Out: 39189 Physical Examination: 
 
General: Elderly man in no acute distress HEENT:           [place]:  No lines Resp:  Rales + CV:  VAD sounds; No LE edema GI:  Soft and non-tender; no distension Neurologic:  Alert and appropriate; normal speech :  + neal; Hematuria- CLEARING [x]    High complexity decision making was performed 
[x]    Patient is at high-risk of decompensation with multiple organ involvement Lab Data Personally Reviewed: I have reviewed all the pertinent labs, microbiology data and radiology studies during assessment. Recent Labs  
  12/25/19 
0056 12/24/19 8319 12/23/19 
0247 * 134* 133* K 4.4 4.6 3.9 CL 93* 94* 93* CO2 34* 36* 36* * 118* 113* BUN 33* 34* 33* CREA 1.75* 1.80* 1.79* CA 8.8 9.0 9.1 MG 2.0 2.2 2.0 ALB 2.7* 2.7* 2.5* SGOT 27 26 26 ALT 20 18 18 INR 1.7* 1.8* 1.5* Recent Labs  
  12/25/19 
1238 12/25/19 
0056 12/24/19 
1154 12/24/19 
0123 12/23/19 
1725 12/23/19 
0247 WBC  --  4.1  --  4.4  --  4.9 HGB 8.9* 7.5* 7.5* 7.8* 7.7* 7.8* HCT 28.5* 24.0* 24.8* 25.5* 24.7* 25.2*  
PLT  --  179  --  186  --  186 No results found for: SDES Lab Results Component Value Date/Time Culture result: NO GROWTH 5 DAYS 12/15/2019 03:37 PM  
 Culture result: NO GROWTH 1 DAY 12/15/2019 03:30 PM  
 Culture result: NO GROWTH 5 DAYS 12/06/2019 11:23 PM  
 Culture result: HEAVY ENTEROCOCCUS SPECIES NOTED (A) 11/27/2019 11:10 AM  
 Culture result: LIGHT YEAST (A) 11/27/2019 11:10 AM  
 
Recent Results (from the past 24 hour(s)) GLUCOSE, POC Collection Time: 12/24/19  5:04 PM  
Result Value Ref Range Glucose (POC) 122 (H) 65 - 100 mg/dL Performed by Georgiana Schaefer GLUCOSE, POC Collection Time: 12/24/19  9:55 PM  
Result Value Ref Range Glucose (POC) 141 (H) 65 - 100 mg/dL Performed by Silas Robledo TYPE & SCREEN Collection Time: 12/25/19 12:55 AM  
Result Value Ref Range Crossmatch Expiration 12/28/2019 ABO/Rh(D) O POSITIVE Antibody screen NEG Comment PREVIOUSLY IDENTIFIED Nonspecific Antibody and Nonspecific Cold Antibody Unit number [id number] Blood component type RC LR,2 Unit division 00 Status of unit ALLOCATED Crossmatch result Compatible CBC W/O DIFF Collection Time: 12/25/19 12:56 AM  
Result Value Ref Range WBC 4.1 4.1 - 11.1 K/uL  
 RBC 2.52 (L) 4.10 - 5.70 M/uL HGB 7.5 (L) 12.1 - 17.0 g/dL HCT 24.0 (L) 36.6 - 50.3 % MCV 95.2 80.0 - 99.0 FL  
 MCH 29.8 26.0 - 34.0 PG  
 MCHC 31.3 30.0 - 36.5 g/dL  
 RDW 17.6 (H) 11.5 - 14.5 % PLATELET 673 668 - 242 K/uL MPV 9.4 8.9 - 12.9 FL  
 NRBC 0.0 0  WBC ABSOLUTE NRBC 0.00 0.00 - 0.01 K/uL PROTHROMBIN TIME + INR Collection Time: 12/25/19 12:56 AM  
Result Value Ref Range INR 1.7 (H) 0.9 - 1.1 Prothrombin time 16.6 (H) 9.0 - 11.1 sec PTT Collection Time: 12/25/19 12:56 AM  
Result Value Ref Range aPTT 33.5 (H) 22.1 - 32.0 sec  
 aPTT, therapeutic range     58.0 - 77.0 SECS  
DIGOXIN Collection Time: 12/25/19 12:56 AM  
Result Value Ref Range Digoxin level 0.9 0.90 - 2.00 NG/ML  
LD Collection Time: 12/25/19 12:56 AM  
Result Value Ref Range  85 - 241 U/L  
LACTIC ACID Collection Time: 12/25/19 12:56 AM  
Result Value Ref Range Lactic acid 0.7 0.4 - 2.0 MMOL/L  
PROCALCITONIN Collection Time: 12/25/19 12:56 AM  
Result Value Ref Range Procalcitonin 0.07 ng/mL MAGNESIUM Collection Time: 12/25/19 12:56 AM  
Result Value Ref Range Magnesium 2.0 1.6 - 2.4 mg/dL METABOLIC PANEL, COMPREHENSIVE Collection Time: 12/25/19 12:56 AM  
Result Value Ref Range Sodium 131 (L) 136 - 145 mmol/L Potassium 4.4 3.5 - 5.1 mmol/L Chloride 93 (L) 97 - 108 mmol/L  
 CO2 34 (H) 21 - 32 mmol/L Anion gap 4 (L) 5 - 15 mmol/L Glucose 110 (H) 65 - 100 mg/dL BUN 33 (H) 6 - 20 MG/DL Creatinine 1.75 (H) 0.70 - 1.30 MG/DL  
 BUN/Creatinine ratio 19 12 - 20 GFR est AA 47 (L) >60 ml/min/1.73m2 GFR est non-AA 39 (L) >60 ml/min/1.73m2 Calcium 8.8 8.5 - 10.1 MG/DL Bilirubin, total 0.6 0.2 - 1.0 MG/DL  
 ALT (SGPT) 20 12 - 78 U/L  
 AST (SGOT) 27 15 - 37 U/L Alk. phosphatase 119 (H) 45 - 117 U/L Protein, total 6.8 6.4 - 8.2 g/dL Albumin 2.7 (L) 3.5 - 5.0 g/dL Globulin 4.1 (H) 2.0 - 4.0 g/dL A-G Ratio 0.7 (L) 1.1 - 2.2 NT-PRO BNP Collection Time: 12/25/19 12:56 AM  
Result Value Ref Range NT pro-BNP 1,486 (H) <125 PG/ML  
GLUCOSE, POC Collection Time: 12/25/19  6:52 AM  
Result Value Ref Range Glucose (POC) 113 (H) 65 - 100 mg/dL Performed by Harshal Adams GLUCOSE, POC Collection Time: 12/25/19 11:30 AM  
Result Value Ref Range Glucose (POC) 178 (H) 65 - 100 mg/dL Performed by Phyllis Willson (CON) HGB & HCT Collection Time: 12/25/19 12:38 PM  
Result Value Ref Range HGB 8.9 (L) 12.1 - 17.0 g/dL HCT 28.5 (L) 36.6 - 50.3 % Bebeto Short MD

## 2019-12-26 NOTE — DIABETES MGMT
[facility] Cardiac Surgery Note Follow Up Recommendations/ Comments: POD 31 LVAD 
BG's remain < 140 mg/dL with the exception of two results pre lunch 160 - 18 mg/dlPt is s/p LVAD placement 11/18/2019. Had brachial thrombectomy 11/26/19. Pt returned to CVICU 12/4/2019 due to respiratory distress. PO intake varies - 25 - 50% Please add carb consistent to current diet order Current hospital DM medication: lispro correction scale, normal sensitivity Chart reviewed and initial evaluation complete on Sona Kiser. Patient is a 71 y.o. male with no prior hx. Recent A1c suggestive of new dx. DTC saw pt for education regarding new diagnosis on 11/27/2019. May need review closer to discharge A1c:  
Lab Results Component Value Date/Time Hemoglobin A1c 6.6 (H) 10/25/2019 02:56 PM  
              
Recent Glucose Results:  
Lab Results Component Value Date/Time GLU 93 12/26/2019 02:48 AM  
 GLUCPOC 110 (H) 12/26/2019 12:17 PM  
 GLUCPOC 105 (H) 12/26/2019 06:07 AM  
 GLUCPOC 116 (H) 12/25/2019 09:14 PM  
  
 
Lab Results Component Value Date/Time Creatinine 1.52 (H) 12/26/2019 02:48 AM  
 
Estimated Creatinine Clearance: 48.5 mL/min (A) (based on SCr of 1.52 mg/dL (H)). Active Orders Diet DIET REGULAR 2 [place]/2 Mildly Thick PO intake:  
Patient Vitals for the past 72 hrs: 
 % Diet Eaten 12/26/19 1200 30 % 12/26/19 0911 25 % 12/25/19 1858 50 % 12/25/19 1501 50 % 12/25/19 0810 50 % 12/24/19 1131 25 % 12/24/19 0753 100 % Will continue to follow as needed. Thank you. Aleksandar Su RN, CDE [facility] [employer]

## 2019-12-26 NOTE — PROGRESS NOTES
SLP Contact Note Had initially planned to see patient for dysphagia education given his nectar-thick liquids. However, upon arriving to room, pt's vocal quality almost fully improved, able to gain vocal fold closure to produce strong throat clear/cough and mentation has significantly improved. Therefore, will plan to complete a repeat Modified Barium Swallow Study to assess for safety of patient to advance liquid consistency. Discussed with RN who states that pt hypotensive and wants to ensure that he is stable enough to leave the floor. Therefore, will plan to complete MBS tomorrow if patient appropriate. Thank you, Ad Perry M.Ed, CCC-SLP Speech-Language Pathologist

## 2019-12-26 NOTE — PROGRESS NOTES
[facility] 
 [address] [facility], [address] [facility] Phone: [phone]   [phone]   
  
Nephrology Progress Note Sona Kiser     1950     [id number] Date of Admission : 10/25/2019 
12/26/19 CC:  Follow up for JUAN J, CKD, Hyponatremia Assessment and Plan JUAN J on CKD: 
-Improved today Hold diuretics given his orthostasis and hypotension-  
-Ordered IV albumin to 250 mL x 1 if needed Hyponatremia : 
- 2/2 CHF vs fluid overload / water retention from CBI  
-Sodium stable at 132 Gross hematuria, BPH w/ retention: 
- off CBI pre VAD. cysto pre op showed catheter related Cystitis @ postr wall  
-CBI stopped and Lizama removed today 
-Continue with Flomax and Proscar 
-Appreciate urology help Hypokalemia  
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  
- unable to get CTA/MRA 
- On [facility] Acute on Chronic HFrEF  
- EF 16-20%, NYHA Class IV , hx of VF arrest - s/p AICD 
- Impella insertion 10/29- removed 11/18  
- s/p LVAD placement on 11/18 
- s/p right thoracentesis. CT in place Hoarseness, vocal cord paralysis, mediastinal adenopathy: 
- will need eventual PET/CT 
  
L arm clot s/p thrombectomy on 11/25 JOSE on CPAP  
  
PAF s/p DCCV 6/19 
  
Anemia: 
- from blood loss s/p multiple blood products - s/p IV iron,  Repeat iron studies ok -  IV iron and increased Epogen ordered 1225 Interval History:   
Seen and examined He had orthostasis and hypotension yesterday Received a unit of blood Chest x-ray remained clear He feels better today Lizama catheter was removed this morning Her talk to Dr. Homero Dance about plans if gross hematuria recurs Denies any N/V/CP/SOB Review of Systems: as per HPI Current Medications:  
Current Facility-Administered Medications Medication Dose Route Frequency  milrinone (PRIMACOR) 20 MG/100 ML D5W infusion  0.125 mcg/kg/min IntraVENous CONTINUOUS  
  warfarin (COUMADIN) tablet 4 mg  4 mg Oral ONCE  
 albumin human 5% (BUMINATE) solution 12.5 g  12.5 g IntraVENous DIALYSIS PRN  
 senna-docusate (PERICOLACE) 8.6-50 mg per tablet 1 Tab  1 Tab Oral DAILY  [Held by provider] bumetanide (BUMEX) injection 1 mg  1 mg IntraVENous BID  epoetin yvonne-epbx (RETACRIT) injection 20,000 Units  20,000 Units SubCUTAneous Q MON, WED & FRI  iron sucrose (VENOFER) 300 mg in 0.9% sodium chloride 250 mL IVPB  300 mg IntraVENous Q24H  
 levETIRAcetam (KEPPRA) tablet 125 mg  125 mg Oral BID  dronabinol (MARINOL) capsule 2.5 mg  2.5 mg Oral DAILY  polyethylene glycol (MIRALAX) packet 17 g  17 g Oral DAILY  albuterol-ipratropium (DUO-NEB) 2.5 MG-0.5 MG/3 ML  3 mL Nebulization Q6H PRN  therapeutic multivitamin (THERAGRAN) tablet 1 Tab  1 Tab Oral DAILY  escitalopram oxalate (LEXAPRO) tablet 10 mg  10 mg Oral QPM  
 potassium chloride SR (KLOR-CON 10) tablet 40 mEq  40 mEq Oral DAILY  alteplase (CATHFLO) 1 mg in sterile water (preservative free) 1 mL injection  1 mg InterCATHeter PRN  finasteride (PROSCAR) tablet 5 mg  5 mg Oral DAILY  spironolactone (ALDACTONE) tablet 25 mg  25 mg Oral DAILY  clonazePAM (KlonoPIN) tablet 0.5 mg  0.5 mg Oral TID PRN  
 sildenafil (pulm.hypertension) (REVATIO) tablet 40 mg  40 mg Oral TID  magnesium oxide (MAG-OX) tablet 400 mg  400 mg Oral BID  diphenhydrAMINE (BENADRYL) capsule 25 mg  25 mg Oral QHS PRN  
 octreotide (SANDOSTATIN) injection 25 mcg  25 mcg SubCUTAneous TID  benzocaine-menthol (CEPACOL) lozenge 1 Lozenge  1 Lozenge Mucous Membrane PRN  
 digoxin (LANOXIN) tablet 0.0625 mg  62.5 mcg Oral Q MON, WED & FRI  pantoprazole (PROTONIX) tablet 40 mg  40 mg Oral ACB  allopurinol (ZYLOPRIM) tablet 100 mg  100 mg Oral DAILY  arformoterol (BROVANA) neb solution 15 mcg  15 mcg Nebulization BID RT  And  
 budesonide (PULMICORT) 500 mcg/2 ml nebulizer suspension  500 mcg Nebulization BID RT  
  artificial saliva (MOUTH KOTE) 1 Spray  1 Spray Oral PRN  
 lactobac ac& pc-s.therm-b.anim (TIAN Q/RISAQUAD)  1 Cap Oral DAILY  oxyCODONE IR (ROXICODONE) tablet 5 mg  5 mg Oral Q6H PRN  
 balsam peru-castor oil (VENELEX) ointment   Topical TID  tamsulosin (FLOMAX) capsule 0.4 mg  0.4 mg Oral DAILY  insulin lispro (HUMALOG) injection   SubCUTAneous AC&HS  Warfarin MD/NP dosing   Other PRN  
 sodium chloride (NS) flush 5-40 mL  5-40 mL IntraVENous Q8H  
 sodium chloride (NS) flush 5-40 mL  5-40 mL IntraVENous PRN  
 acetaminophen (TYLENOL) tablet 650 mg  650 mg Oral Q6H PRN  
 naloxone (NARCAN) injection 0.4 mg  0.4 mg IntraVENous PRN  
 ondansetron (ZOFRAN) injection 4 mg  4 mg IntraVENous Q4H PRN  
 bisacodyl (DULCOLAX) tablet 5 mg  5 mg Oral DAILY PRN  
 sucralfate (CARAFATE) tablet 1 g  1 g Oral AC&HS  influenza vaccine 2019-20 (6 mos+)(PF) (FLUARIX/FLULAVAL/FLUZONE QUAD) injection 0.5 mL  0.5 mL IntraMUSCular PRIOR TO DISCHARGE  hydrALAZINE (APRESOLINE) 20 mg/mL injection 20 mg  20 mg IntraVENous Q6H PRN  
 dextrose 10 % infusion 125-250 mL  125-250 mL IntraVENous PRN No Known Allergies Objective: 
Vitals:   
Vitals:  
 12/25/19 2300 12/26/19 0300 12/26/19 9732 12/26/19 3459 BP:      
Pulse: 75 82  76 Resp: 18 17  16 Temp: 97.6 °F (36.4 °C) 97.7 °F (36.5 °C)  97.9 °F (36.6 °C) SpO2: 94% 95%  93% Weight:   74.7 kg (164 lb 10.9 oz) Height:      
 
Intake and Output: 
12/26 0701 - 12/26 1900 In: 480 [P.O.:480] Out: -  
12/24 1901 - 12/26 0700 In: 61577.9 [P.O.:1500; I.V.:383.4] Out: 37915 Physical Examination: 
 
General: Elderly man in no acute distress Neck:  No lines Resp:  TA 
CV:  VAD sounds; No LE edema GI:  Soft and non-tender; no distension Neurologic:  Alert and appropriate; normal speech :  No Lizama [x]    High complexity decision making was performed 
[x]    Patient is at high-risk of decompensation with multiple organ involvement Lab Data Personally Reviewed: I have reviewed all the pertinent labs, microbiology data and radiology studies during assessment. Recent Labs  
  12/26/19 
0248 12/25/19 
0056 12/24/19 
0123 * 131* 134* K 4.2 4.4 4.6 CL 95* 93* 94* CO2 32 34* 36* GLU 93 110* 118* BUN 27* 33* 34* CREA 1.52* 1.75* 1.80* CA 8.8 8.8 9.0 MG 2.0 2.0 2.2 ALB 2.4* 2.7* 2.7* SGOT 25 27 26 ALT 19 20 18 INR 1.7* 1.7* 1.8* Recent Labs  
  12/26/19 
0248 12/25/19 
1238 12/25/19 0056 12/24/19 
1154 12/24/19 
0123 WBC 4.2  --  4.1  --  4.4 HGB 8.2* 8.9* 7.5* 7.5* 7.8* HCT 26.4* 28.5* 24.0* 24.8* 25.5*  
  --  179  --  186 No results found for: SDES Lab Results Component Value Date/Time Culture result: NO GROWTH 5 DAYS 12/15/2019 03:37 PM  
 Culture result: NO GROWTH 1 DAY 12/15/2019 03:30 PM  
 Culture result: NO GROWTH 5 DAYS 12/06/2019 11:23 PM  
 Culture result: HEAVY ENTEROCOCCUS SPECIES NOTED (A) 11/27/2019 11:10 AM  
 Culture result: LIGHT YEAST (A) 11/27/2019 11:10 AM  
 
Recent Results (from the past 24 hour(s)) GLUCOSE, POC Collection Time: 12/25/19 11:30 AM  
Result Value Ref Range Glucose (POC) 178 (H) 65 - 100 mg/dL Performed by Susan Gaming (CON) HGB & HCT Collection Time: 12/25/19 12:38 PM  
Result Value Ref Range HGB 8.9 (L) 12.1 - 17.0 g/dL HCT 28.5 (L) 36.6 - 50.3 % GLUCOSE, POC Collection Time: 12/25/19  5:30 PM  
Result Value Ref Range Glucose (POC) 111 (H) 65 - 100 mg/dL Performed by Skye Tristan GLUCOSE, POC Collection Time: 12/25/19  9:14 PM  
Result Value Ref Range Glucose (POC) 116 (H) 65 - 100 mg/dL Performed by Edgar Stallings METABOLIC PANEL, COMPREHENSIVE Collection Time: 12/26/19  2:48 AM  
Result Value Ref Range Sodium 132 (L) 136 - 145 mmol/L Potassium 4.2 3.5 - 5.1 mmol/L Chloride 95 (L) 97 - 108 mmol/L  
 CO2 32 21 - 32 mmol/L Anion gap 5 5 - 15 mmol/L Glucose 93 65 - 100 mg/dL BUN 27 (H) 6 - 20 MG/DL Creatinine 1.52 (H) 0.70 - 1.30 MG/DL  
 BUN/Creatinine ratio 18 12 - 20 GFR est AA 55 (L) >60 ml/min/1.73m2 GFR est non-AA 46 (L) >60 ml/min/1.73m2 Calcium 8.8 8.5 - 10.1 MG/DL Bilirubin, total 0.7 0.2 - 1.0 MG/DL  
 ALT (SGPT) 19 12 - 78 U/L  
 AST (SGOT) 25 15 - 37 U/L Alk. phosphatase 111 45 - 117 U/L Protein, total 6.3 (L) 6.4 - 8.2 g/dL Albumin 2.4 (L) 3.5 - 5.0 g/dL Globulin 3.9 2.0 - 4.0 g/dL A-G Ratio 0.6 (L) 1.1 - 2.2 MAGNESIUM Collection Time: 12/26/19  2:48 AM  
Result Value Ref Range Magnesium 2.0 1.6 - 2.4 mg/dL CBC W/O DIFF Collection Time: 12/26/19  2:48 AM  
Result Value Ref Range WBC 4.2 4.1 - 11.1 K/uL  
 RBC 2.82 (L) 4.10 - 5.70 M/uL HGB 8.2 (L) 12.1 - 17.0 g/dL HCT 26.4 (L) 36.6 - 50.3 % MCV 93.6 80.0 - 99.0 FL  
 MCH 29.1 26.0 - 34.0 PG  
 MCHC 31.1 30.0 - 36.5 g/dL  
 RDW 17.1 (H) 11.5 - 14.5 % PLATELET 282 192 - 592 K/uL MPV 9.2 8.9 - 12.9 FL  
 NRBC 0.0 0  WBC ABSOLUTE NRBC 0.00 0.00 - 0.01 K/uL PROTHROMBIN TIME + INR Collection Time: 12/26/19  2:48 AM  
Result Value Ref Range INR 1.7 (H) 0.9 - 1.1 Prothrombin time 17.2 (H) 9.0 - 11.1 sec PTT Collection Time: 12/26/19  2:48 AM  
Result Value Ref Range aPTT 37.5 (H) 22.1 - 32.0 sec  
 aPTT, therapeutic range     58.0 - 77.0 SECS  
DIGOXIN Collection Time: 12/26/19  2:48 AM  
Result Value Ref Range Digoxin level 1.0 0.90 - 2.00 NG/ML  
LACTIC ACID Collection Time: 12/26/19  2:48 AM  
Result Value Ref Range Lactic acid 0.8 0.4 - 2.0 MMOL/L  
LD Collection Time: 12/26/19  2:48 AM  
Result Value Ref Range  85 - 241 U/L  
NT-PRO BNP Collection Time: 12/26/19  2:48 AM  
Result Value Ref Range NT pro-BNP 1,838 (H) <125 PG/ML  
GLUCOSE, POC Collection Time: 12/26/19  6:07 AM  
Result Value Ref Range Glucose (POC) 105 (H) 65 - 100 mg/dL Performed by Alex Bean Harlan Menezes MD

## 2019-12-26 NOTE — PROGRESS NOTES
[address] in [place], [place] Inpatient Progress Note Patient name: Martha Gallagher Patient : 1950 Patient MRN: [id number] Attending MD: Yue Dalal MD 
Date of service: 19 Chief Complaint:  
Rosemary Billow azucena LVAD implant 
  
HPI: Sona Kiser is a 71y.o. year old pleasant white male with a history of HTN, HLD, JOSE, CAD s/p cardiac arrest VFib s/p CABG () c/b sternal would infection and sternectomy, ischemic cardiomyopathy LVEF 15-20%, s/p ICD and with LBBB. He was admitted with acute on chronic systolic heart failure with massive volume overload > 20 lbs, in the setting of atrial fibrillation s/p failed DCCV and underwent DCCV and RHC on .  S/p BiVICD on 19 with Dr. Yariel Hanks. He was discharged home home on IV milrinone on 19. Billy Rosas has been followed closely by Dr. Kelsey Mays and the [facility]. 
  
Mr. Kiser was admitted for acute on chronic systolic heart failure. He underwent implantation of Impella 5.0 due to CS and has completed his LVAD evaluation. Billy Rosas meets criteria for implant of HM3 as DT. He was NYHA Class IV prior to implant of Impella 5.0, has LVEF < 15%, was intolerant of GDMT due to symptomatic hypotension and renal dysfunction. He remains dependent on temporary MCS support. RHC  revealed compensated hemodynamics on Impella. His renal function has improved dramatically with improvement in his hemodynamics. He is not a suitable transplant candidate due to single kidney, sternectomy, debility, and frailty. He was reviewed by Vasquez Mejia and felt to be a high risk heart transplant candidate due to multiple co-morbidities as well as the afore mentioned conditions.  He remained in the CCU and underwent a HM 3 implant as DT on . He was weaned off of pressors and transferred to [place] [address] where he continues to undergo PT/OT and hemodynamic optimization.   
  
24Hr Events Transfused Presyncopal  
 Milrinone decreased to 0.125mcg Procedure:  Procedure(s): REMOVAL TEMPORARY LEFT VENTRICULAR ASSIST DEVICE, IMPLANTATION OF LEFT VENTRICULAR ASSIST DEVICE PERMANENT (VAD), ECC, JACQUE, EPI AORTIC US BY DR Tl Campoverde.    
POD:  37 
  
Impression / Plan:  
Heart Failure Status: NYHA Class IV Chronic systolic heart failure due to ICM, NYHA Class IV, EF < 15%, cardiogenic shock bridged with Impella 5.0 support to HM 3 implant S/p Impella removal and LVAD implant 11/18/19 RPMs 6400 rpm - frequent PI events TTE with bubble study negative for PFO Continue milrinone 0.125mcg/kg/min Unable to obtain CardioMEMS readings over the weekend - try on Monday Hold diuretics today per renal, will eval and dose daily Cont Sildenafil 40 mg PO TID - rx sent to local pharmacy to initiate PA Intolerant of BB due to RV failure Intolerant of ACEi/ARB/ARNI/AA due to JUAN J on CKD 3 Strict I/O, daily weights, Na+ restricted diet Continue LVAD education Remove suture from drive line exit site on 12/26 and decrease dressing change frequency to three times weekly Delayed skin healing on bottom portion of sternotomy - will have CSS PA evaluate 
TTE 12/16- EF 15-20% Generalized myoclonus Improved Appreciate Neurology input Decreased Keppra due to somnolence - 125 mg po BID Head CT negative for acute process EEG suggestive of mild generalized encephalopathic process, possibly related to underlying structural brain injury vs metabolic abnormality Monitor closely  
  
Anticoagulation for LVAD, INR goal 1.5-2 Goal decreased d/t persistent hematuria No ASA d/t hematuria INR 1.7 Cont coumadin - 4 mg tonight Epistaxis Resolved Afrin soaked guaze ENT consult appreciated Monitor for now  
  
Acute Respiratory Failure post op Improved with aggressive diuresis Off supplemental O2 Pulmonary hygiene Cont home CPAP- must use while sleeping Acute on CKD 3, atrophic left kidney Appreciate Nephrology assistance Watch Cr - improving Hold diuretics today Avoid nephrotoxins, trend labs Renally dose meds  
  
CAD s/p CABG Off ASA d/t hematuria No BB d/t RV failure Hold statin due to recent hepatic failure LHC performed 11/13 - low likelihood of benefit from revascularization  
  
Hx of VF arrest s/p BiV-ICD No recent shocks Keep K > 4 and Mg > 2  
   
PAF Dig level 0.9 -cont dig (62.5 mcg qMWF) No BB due to RV failure Repeat TFTs WNL 
  
Hx of gout Uric acid WNL Acute blood loss anemia Likely due to hematuria Cont octreotide 25 mcg SQ TID Fecal occult 12/14 negative, positive on 12/17 Appreciate urology recommendations Stop CBI - ?plans for fulguration  
  
Suspected aspiration pneumonia Sputum culture showing scant growth of yeast 
Cefepime complete Urinary retention, c/b serratia UTI and hematuria Appreciate Urology consult Repeat UA with cx negative 12/15 Watch off abx- ? Need for prophylaxis Cystoscopy performed 11/13 shows catheter induced cystitis May need fulguration in future if CBI unsuccessful Sepsis Alert Paired Blood Neg 
Repeat paired cultures Urine cx negative Sputum cx when able Check ESR Malnutrition Appreciate Nutritionist consult Prealbumin weekly - 20.1 on 12/23 Diet as tolerated Intolerant of mirtazapine d/t tremors, confusion Decreased Marinol to 2.5 mg PO qPM d/t lethargy Cont MVI 
  
COPD Appreciate Pulmonology assistance Continue nebs PRN   
  
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  
  
Hx of sternal wound infection, sternectomy Sternum closed post op- monitor  
  
Vocal cord paralysis Improved ENT recs appreciated Neck CT- R neck edema, no airway compromise Speech therapy following - appreciate input Anxiety Klonipin 0.5 mg TID prn anxiety Depression Cont lexapro 10 mg qpm 
Monitor QTc - 478 ms on 12/22 Monitor sodium  
  
Debility Continue PT/OT  
  
Ppx Protonix PT/OT Bowel regimen PICC placed 11/22/19  
  
Dispo: Will need IP rehab. Not ready for discharge at this time LVAD INTERROGATION: 
Device interrogated in person Device function normal, normal flow, multiple PI events LVAD Pump Speed (RPM): 6400 Pump Flow (LPM): 5.4 MAP: 74 
PI (Pulsitility Index): 3.5 Power: 5  Test: Yes 
Back Up  at Bedside & Labeled: Yes Power Module Test: Yes Driveline Site Care: Yes Driveline Dressing: Changed per order Outpatient: Yes 
MAP in Therapeutic Range (Outpatient): Yes Testing Alarms Reviewed: Yes 
Back up SC speed: 6400 Back up Low Speed Limit: 6000 Emergency Equipment with Patient?: Yes Emergency procedures reviewed?: No 
Drive line site inspected?: No 
Drive line intergrity inspected?: Yes Drive line dressing changed?: No 
 
PHYSICAL EXAM: 
Visit Vitals BP 91/72 (BP 1 Location: Left arm, BP Patient Position: At rest) Pulse 76 Temp 97.9 °F (36.6 °C) Resp 16 Ht 6' 2\" (1.88 m) Wt 164 lb 10.9 oz (74.7 kg) SpO2 93% BMI 21.14 kg/m² Physical Exam  
Constitutional: He is oriented to person, place, and time. He appears well-developed. He appears cachectic. No distress. HENT:  
Head: Normocephalic. Neck: Normal range of motion. Neck supple. No JVD present. Cardiovascular: Normal rate, regular rhythm and normal heart sounds. + VAD hum Pulmonary/Chest: Effort normal and breath sounds normal. No respiratory distress. Abdominal: Soft. Bowel sounds are normal. He exhibits no distension. Genitourinary:    Genitourinary Comments: Hematuria Musculoskeletal: Normal range of motion. General: No edema. Neurological: He is alert and oriented to person, place, and time. Skin: Skin is warm and dry. Nursing note and vitals reviewed. REVIEW OF SYSTEMS: 
Review of Systems Constitutional: Positive for malaise/fatigue. Negative for chills and fever. HENT: Positive for hearing loss. Negative for nosebleeds. Respiratory: Negative for cough and shortness of breath. Mild dyspnea Cardiovascular: Negative for chest pain, palpitations, orthopnea and leg swelling. Gastrointestinal: Negative for heartburn, nausea and vomiting. Genitourinary: Negative for hematuria. Musculoskeletal: Negative. Neurological: Positive for weakness. Negative for dizziness and headaches. Endo/Heme/Allergies: Bruises/bleeds easily. PAST MEDICAL HISTORY: 
Past Medical History:  
Diagnosis Date  Degenerative disc disease, lumbar  Heart failure ([facility] [address]  High cholesterol  Hypertension  Paroxysmal atrial fibrillation ([facility] [address] 4/2/2019  Spinal stenosis PAST SURGICAL HISTORY: 
Past Surgical History:  
Procedure Laterality Date  COLONOSCOPY Left 11/11/2019 COLONOSCOPY at bedside performed by Sid Ray MD at [address]  HX CORONARY ARTERY BYPASS GRAFT    
 triple  HX HERNIA REPAIR    
 HX IMPLANTABLE CARDIOVERTER DEFIBRILLATOR  TX CARDIOVERSION ELECTIVE ARRHYTHMIA EXTERNAL N/A 6/10/2019 EP CARDIOVERSION performed by Louann Valdivia MD at [place] [address] CATH LAB  TX CARDIOVERSION ELECTIVE ARRHYTHMIA EXTERNAL N/A 6/18/2019 EP CARDIOVERSION performed by Bossman Reid MD at [place] [address], [address] CATH LAB  TX INSJ/RPLCMT PERM DFB W/TRNSVNS LDS 1/DUAL CHMBR N/A 6/21/2019 INSERT ICD BIV MULTI performed by Marii Rey MD at [place] [address], [address] CATH LAB  TX TCAT IMPL WRLS P-ART PRS SNR L-T HEMODYN MNTR N/A 9/18/2019 IMPLANT HEART FAILURE MONITORING DEVICE performed by Aurora Lucio MD at [place] [address], [address] CATH LAB FAMILY HISTORY: 
Family History Problem Relation Age of Onset  Lung Disease Mother  Hypertension Mother [facility] Arthritis-osteo Mother  Heart Disease Mother  Heart Disease Father  Diabetes Father SOCIAL HISTORY: 
Social History Socioeconomic History  Marital status:  Spouse name: Not on file  Number of children: Not on file  Years of education: Not on file  Highest education level: Not on file Tobacco Use  Smoking status: Former Smoker Last attempt to quit: 2010 Years since quittin.0  Smokeless tobacco: Never Used Substance and Sexual Activity  Alcohol use: Not Currently Comment: rarely  Drug use: Never Other Topics Concern LABORATORY RESULTS: 
  
Labs Latest Ref Rng & Units 2019 WBC 4.1 - 11.1 K/uL 4.2 - 4.1 - 4.4 - 4.9  
RBC 4.10 - 5.70 M/uL 2.82(L) - 2.52(L) - 2.70(L) - 2.70(L) Hemoglobin 12.1 - 17.0 g/dL 8. 2(L) 8. 9(L) 7. 5(L) 7. 5(L) 7. 8(L) 7. 7(L) 7. 8(L) Hematocrit 36.6 - 50.3 % 26. 4(L) 28. 5(L) 24. 0(L) 24. 8(L) 25. 5(L) 24. 7(L) 25. 2(L) MCV 80.0 - 99.0 FL 93.6 - 95.2 - 94.4 - 93.3 Platelets 946 - 987 K/uL 166 - 179 - 186 - 186 Lymphocytes 12 - 49 % - - - - - - - Monocytes 5 - 13 % - - - - - - - Eosinophils 0 - 7 % - - - - - - - Basophils 0 - 1 % - - - - - - - Albumin 3.5 - 5.0 g/dL 2. 4(L) - 2. 7(L) - 2. 7(L) - 2. 5(L) Calcium 8.5 - 10.1 MG/DL 8.8 - 8.8 - 9.0 - 9.1 SGOT 15 - 37 U/L 25 - 27 - 26 - 26 Glucose 65 - 100 mg/dL 93 - 110(H) - 118(H) - 113(H) BUN 6 - 20 MG/DL 27(H) - 33(H) - 34(H) - 33(H) Creatinine 0.70 - 1.30 MG/DL 1.52(H) - 1.75(H) - 1.80(H) - 1.79(H) Sodium 136 - 145 mmol/L 132(L) - 131(L) - 134(L) - 133(L) Potassium 3.5 - 5.1 mmol/L 4.2 - 4.4 - 4.6 - 3.9 TSH 0.36 - 3.74 uIU/mL - - - - - - -  
LDH 85 - 241 U/L 208 - 193 - 202 - 218 CEA ng/mL - - - - - - - Some recent data might be hidden Lab Results Component Value Date/Time TSH 2.30 2019 03:29 AM  
 TSH 2.40 10/25/2019 07:39 PM  
 TSH 2.45 2019 04:16 AM  
 
 
ALLERGY: 
No Known Allergies CURRENT MEDICATIONS: 
 Current Facility-Administered Medications Medication Dose Route Frequency  milrinone (PRIMACOR) 20 MG/100 ML D5W infusion  0.125 mcg/kg/min IntraVENous CONTINUOUS  
 senna-docusate (PERICOLACE) 8.6-50 mg per tablet 1 Tab  1 Tab Oral DAILY  bumetanide (BUMEX) injection 1 mg  1 mg IntraVENous BID  epoetin yvonne-epbx (RETACRIT) injection 20,000 Units  20,000 Units SubCUTAneous Q MON, WED & FRI  iron sucrose (VENOFER) 300 mg in 0.9% sodium chloride 250 mL IVPB  300 mg IntraVENous Q24H  
 levETIRAcetam (KEPPRA) tablet 125 mg  125 mg Oral BID  dronabinol (MARINOL) capsule 2.5 mg  2.5 mg Oral DAILY  polyethylene glycol (MIRALAX) packet 17 g  17 g Oral DAILY  albuterol-ipratropium (DUO-NEB) 2.5 MG-0.5 MG/3 ML  3 mL Nebulization Q6H PRN  therapeutic multivitamin (THERAGRAN) tablet 1 Tab  1 Tab Oral DAILY  escitalopram oxalate (LEXAPRO) tablet 10 mg  10 mg Oral QPM  
 potassium chloride SR (KLOR-CON 10) tablet 40 mEq  40 mEq Oral DAILY  alteplase (CATHFLO) 1 mg in sterile water (preservative free) 1 mL injection  1 mg InterCATHeter PRN  finasteride (PROSCAR) tablet 5 mg  5 mg Oral DAILY  spironolactone (ALDACTONE) tablet 25 mg  25 mg Oral DAILY  clonazePAM (KlonoPIN) tablet 0.5 mg  0.5 mg Oral TID PRN  
 sildenafil (pulm.hypertension) (REVATIO) tablet 40 mg  40 mg Oral TID  magnesium oxide (MAG-OX) tablet 400 mg  400 mg Oral BID  diphenhydrAMINE (BENADRYL) capsule 25 mg  25 mg Oral QHS PRN  
 octreotide (SANDOSTATIN) injection 25 mcg  25 mcg SubCUTAneous TID  benzocaine-menthol (CEPACOL) lozenge 1 Lozenge  1 Lozenge Mucous Membrane PRN  
 digoxin (LANOXIN) tablet 0.0625 mg  62.5 mcg Oral Q MON, WED & FRI  pantoprazole (PROTONIX) tablet 40 mg  40 mg Oral ACB  allopurinol (ZYLOPRIM) tablet 100 mg  100 mg Oral DAILY  arformoterol (BROVANA) neb solution 15 mcg  15 mcg Nebulization BID RT  And  
 budesonide (PULMICORT) 500 mcg/2 ml nebulizer suspension  500 mcg Nebulization BID RT  
 artificial saliva (MOUTH KOTE) 1 Spray  1 Spray Oral PRN  
 lactobac ac& pc-s.therm-b.anim (TIAN Q/RISAQUAD)  1 Cap Oral DAILY  oxyCODONE IR (ROXICODONE) tablet 5 mg  5 mg Oral Q6H PRN  
 balsam peru-castor oil (VENELEX) ointment   Topical TID  tamsulosin (FLOMAX) capsule 0.4 mg  0.4 mg Oral DAILY  insulin lispro (HUMALOG) injection   SubCUTAneous AC&HS  Warfarin MD/NP dosing   Other PRN  
 sodium chloride (NS) flush 5-40 mL  5-40 mL IntraVENous Q8H  
 sodium chloride (NS) flush 5-40 mL  5-40 mL IntraVENous PRN  
 acetaminophen (TYLENOL) tablet 650 mg  650 mg Oral Q6H PRN  
 naloxone (NARCAN) injection 0.4 mg  0.4 mg IntraVENous PRN  
 ondansetron (ZOFRAN) injection 4 mg  4 mg IntraVENous Q4H PRN  
 bisacodyl (DULCOLAX) tablet 5 mg  5 mg Oral DAILY PRN  
 sucralfate (CARAFATE) tablet 1 g  1 g Oral AC&HS  influenza vaccine 2019-20 (6 mos+)(PF) (FLUARIX/FLULAVAL/FLUZONE QUAD) injection 0.5 mL  0.5 mL IntraMUSCular PRIOR TO DISCHARGE  hydrALAZINE (APRESOLINE) 20 mg/mL injection 20 mg  20 mg IntraVENous Q6H PRN  
 dextrose 10 % infusion 125-250 mL  125-250 mL IntraVENous PRN Thank you for allowing me to participate in this patient's care. TIERRA Morales [address], [address] Phone: [phone] Fax: [phone] [facility] ATTENDING ADDENDUM Patient was seen and examined in person. Data and notes were reviewed. I have discussed and agree with the plan as noted in the NP note above without further additions. Isauro Parson MD PhD 
[id number] [facility]

## 2019-12-26 NOTE — PROGRESS NOTES
Problem: Self Care Deficits Care Plan (Adult) Goal: *Acute Goals and Plan of Care (Insert Text) Description FUNCTIONAL STATUS PRIOR TO ADMISSION: Patient was modified independent using a single point cane for functional mobility. Patient able to shower and dress himself. However, patient required assistance for household management from his wife. HOME SUPPORT: The patient lived alone with wife to provide assistance. Occupational Therapy Goals: 
 
Goals reviewed and continued/modified as follows 12/26/2019 1. Patient will perform upper body dressing moderate assistance within 7 day(s) including management of LVAD. 2.  Patient will perform lower body dressing with minimal assistance within 7 day(s). (able to [place] socks in bed, needs MOD A for dynamic standing balance) 3. Patient will perform grooming seated EOB with supervision/setup within 7 day(s). 4.  Patient will perform toilet transfers with minimum assistance within 7 day(s). -CONTINUE 5. Patient will perform all aspects of toileting with moderate assistance within 7 day(s). -CONTINUE 6. Patient will participate in upper extremity therapeutic exercise/activities with supervision/set-up-min A for 5 minutes within 7 day(s). -GOAL MET (discontinue) 7. Patient will utilize energy conservation techniques during functional activities with verbal cues within 7 day(s). 8. Patient will verbalize 3/5 LVAD components with min verbal cues within 7 day (s). -CONTINUE Goals reviewed and continued 12/19/2019 OT weekly reassessment 12/6/19: goals modified 1. Patient will perform upper body dressing moderate assistance within 7 day(s). 2.  Patient will perform lower body dressing with moderate assistance within 7 day(s). 3.  Patient will perform grooming seated EOB with minimum assistance within 7 day(s). 4.  Patient will perform toilet transfers with minimum assistance within 7 day(s). 5.  Patient will perform all aspects of toileting with moderate assistance within 7 day(s). 6.  Patient will participate in upper extremity therapeutic exercise/activities with supervision/set-up-min A for 5 minutes within 7 day(s). 7.  Patient will utilize energy conservation techniques during functional activities with verbal cues within 7 day(s). 8. Patient will verbalize 3/5 LVAD components with min verbal cues within 7 day (s). OT weekly reassessment 11/29/19: goals modified below 1. Patient will perform upper body dressing including LVAD switchovers with moderate assistance within 7 day(s). 2.  Patient will perform lower body dressing with minimal assistance within 7 day(s). 3.  Patient will perform grooming in standing with minimum assistance within 7 day(s). 4.  Patient will perform toilet transfers with minimum assistance within 7 day(s). 5.  Patient will perform all aspects of toileting with minimal assistance within 7 day(s). 6.  Patient will participate in upper extremity therapeutic exercise/activities with supervision/set-up for 5 minutes within 7 day(s). 7.  Patient will utilize energy conservation techniques during functional activities with verbal cues within 7 day(s). 8. Patient will verbalize LVAD terminology with verbal cues within 7 day (s). Goals reviewed and continued/modified as follows 11/20/19 s/p LVAD implantation 1. Patient will perform upper body dressing including LVAD switchovers with moderate assistance within 7 day(s). 2.  Patient will perform lower body dressing with minimal assistance within 7 day(s). 3.  Patient will perform grooming with supervision/setup within 7 day(s). 4.  Patient will perform toilet transfers supervision/setupmodified independence within 7 day(s). 5.  Patient will perform all aspects of toileting with minimal assistance within 7 day(s).  
6.  Patient will participate in upper extremity therapeutic exercise/activities with supervision/set-up for 5 minutes within 7 day(s). 7.  Patient will utilize energy conservation techniques during functional activities with verbal cues within 7 day(s). 8. Patient will verbalize LVAD terminology with verbal cues within 7 day (s). Goals reviewed and continued/modified as follows 11/12/19 1. Patient will perform bathing with supervision/set-up within 7 day(s). 2.  Patient will perform lower body dressing with supervision/set-up within 7 day(s). 3.  Patient will perform grooming with modified independence within 7 day(s). 4.  Patient will perform toilet transfers with modified independence within 7 day(s). 5.  Patient will perform all aspects of toileting with supervision/set-up within 7 day(s). 6.  Patient will participate in upper extremity therapeutic exercise/activities with supervision/set-up for 5 minutes within 7 day(s). 7.  Patient will utilize energy conservation techniques during functional activities with verbal cues within 7 day(s). 8. Patient will verbalize LVAD terminology with verbal cues within 7 day (s) in preparation for implant. Continue all goals 10/30/19 post re-eval for Impella removal  
Initiated 10/28/2019 1. Patient will perform bathing with supervision/set-up within 7 day(s). 2.  Patient will perform lower body dressing with supervision/set-up within 7 day(s). 3.  Patient will perform grooming with modified independence within 7 day(s). 4.  Patient will perform toilet transfers with modified independence within 7 day(s). 5.  Patient will perform all aspects of toileting with supervision/set-up within 7 day(s). 6.  Patient will participate in upper extremity therapeutic exercise/activities with supervision/set-up for 5 minutes within 7 day(s). 7.  Patient will utilize energy conservation techniques during functional activities with verbal cues within 7 day(s).  
 
 
 
         
Outcome: Progressing Towards Goal 
 OCCUPATIONAL THERAPY TREATMENT/WEEKLY RE-EVALUATION Patient: Anabel Herrera (75 y.o. male) Date: 12/26/2019 Diagnosis: Acute decompensated heart failure ([facility] [address] [I50.9] <principal problem not specified> Procedure(s) (LRB): LEFT BRACHIAL THROMBECTOMY (Left) 31 Days Post-Op Precautions: Fall, Sternal(LVAD ) Chart, occupational therapy assessment, plan of care, and goals were reviewed. ASSESSMENT Based on the objective data described below, patient continues to present with generalized weakness, decreased insight into deficits, impulsivity, decreased attention, and impaired standing balance (ataxia) w/ double vision requiring eye patch for support. Patient re-educated on LVAD terminology today and required verbal cues/prompting for recall. Patient performed LVAD switchover today with MAX A due to repetitive verbal cues and demonstration required and patient with limited problem-solving or sequencing ability. Patient, again, with BM on BSC in session today requiring MOD A x 2 for stand pivot transfers. Patient w/ c/o dizziness standing (MAP supine 72; seated 76; unable to obtain standing MAP due to dizziness). Will continue to assess. Continue to recommend rehab at discharge. Current Level of Function Impacting Discharge (ADLs): MAX A toileting; MAX A LVAD management Other factors to consider for discharge: cognition; safety awareness; LVAD HM III; unable to urinate in session (catheter recently removed), needs extensive education on LVAD equipment/switchovers and repetition for improved accuracy PLAN : 
Goals have been updated based on progression since last assessment. Patient continues to benefit from skilled intervention to address the above impairments. Continue to follow patient 5 times a week to address goals. Recommend with staff: OOB x 3 to chair; BUE cardiac exercises; review LVAD equipment Recommend next OT session: standing ADL task Recommendation for discharge: (in order for the patient to meet his/her long term goals) Therapy 3 hours per day 5-7 days per week This discharge recommendation: 
Has been made in collaboration with the attending provider and/or case management Equipment recommendations for successful discharge (if) home: TBD SUBJECTIVE:  
Patient stated I don't want to stand because I will just shake (patient encouraged and then agreeable).  OBJECTIVE DATA SUMMARY:  
Cognitive/Behavioral Status: 
Neurologic State: Alert Orientation Level: Oriented X4 Cognition: Appropriate for age attention/concentration Perception: Appears intact Perseveration: No perseveration noted Safety/Judgement: Decreased insight into deficits; Decreased awareness of need for safety;Decreased awareness of need for assistance Functional Mobility and Transfers for ADLs: 
Bed Mobility: 
Supine to Sit: Contact guard assistance;Assist x2; Additional time Transfers: 
Sit to Stand: Moderate assistance;Assist x2; Additional time Functional Transfers Toilet Transfer : Moderate assistance;Assist x2; Additional time; Adaptive equipment(stand pivot > BSC) Bed to Chair: Moderate assistance;Assist x2; Additional time(stand pivot from [facility]) Balance: 
Sitting: Without support; Impaired Sitting - Static: Good (unsupported) Sitting - Dynamic: Fair (occasional) Standing: Impaired; With support Standing - Static: Fair;Constant support Standing - Dynamic : Poor;Constant support ADL Intervention: Lower Body Dressing Assistance Socks: Contact guard assistance(in bed) Toileting Toileting Assistance: Maximum assistance Bowel Hygiene: Maximum assistance Adaptive Equipment: (BSC) Cognitive Retraining Safety/Judgement: Decreased insight into deficits; Decreased awareness of need for safety;Decreased awareness of need for assistance Activity Tolerance:  
Fair Please refer to the flowsheet for vital signs taken during this treatment. After treatment patient left in no apparent distress:  
Sitting in chair, Call bell within reach, Bed / chair alarm activated, and Caregiver / family present COMMUNICATION/COLLABORATION:  
The patients plan of care was discussed with: Physical Therapist and Registered Nurse Patrizia Signs Time Calculation: 53 mins

## 2019-12-26 NOTE — PROGRESS NOTES
1923: Bedside shift change report [facility] by [facility] (offgoing nurse). Report included the following information SBAR, Kardex, Procedure Summary, Intake/Output, MAR, Recent Results, and Cardiac Rhythm paced . 2625: When trying to assist pt OOB to be weighed and up to the chair, pt c/o dizziness. MAP found to be 58 while sitting on the side of the bed. Pt assisted back to bed. MAP back up to mid 80s. Paged and notified Kristine Singh NP. NP verbalized understanding. States to decrease milrinone rate down to 0.125 mcg/kg/min and to keep patient in bed until the team can round on him this morning. Repeated back and verified. 0730: Bedside shift change report given to Og Matias (oncoming nurse). Report included the following information SBAR, Kardex, Procedure Summary, Intake/Output, MAR, Recent Results and Cardiac Rhythm Paced.  
 
----------------- Care Plan  2046 Problem: Falls - Risk of 
Goal: *Absence of Falls Description Document [facility] Fall Risk and appropriate interventions in the flowsheet. Outcome: Progressing Towards Goal 
Note: Fall Risk Interventions: 
Mobility Interventions: Communicate number of staff needed for ambulation/transfer, OT consult for ADLs, Patient to call before getting OOB, PT Consult for mobility concerns Mentation Interventions: Adequate sleep, hydration, pain control Medication Interventions: Evaluate medications/consider consulting pharmacy, Patient to call before getting OOB, Teach patient to arise slowly Elimination Interventions: Call light in reach, Elevated toilet seat, Stay With Me (per policy), Toilet paper/wipes in reach History of Falls Interventions: Evaluate medications/consider consulting pharmacy Problem: Pressure Injury - Risk of 
Goal: *Prevention of pressure injury Description Document Mich Scale and appropriate interventions in the flowsheet. Outcome: Progressing Towards Goal 
Note: Pressure Injury Interventions: Sensory Interventions: Assess changes in LOC Moisture Interventions: Absorbent underpads Activity Interventions: Increase time out of bed, Pressure redistribution bed/mattress(bed type), PT/OT evaluation Mobility Interventions: HOB 30 degrees or less, Turn and reposition approx. every two hours(pillow and wedges), PT/OT evaluation Nutrition Interventions: Document food/fluid/supplement intake Friction and Shear Interventions: HOB 30 degrees or less, Lift sheet Problem: LVAD Post-op Day 15 to Discharge Goal: Activity/Safety Outcome: Progressing Towards Goal 
Note: Up with x 2 assist.  
Goal: Nutrition/Diet Outcome: Progressing Towards Goal 
Goal: Medications Outcome: Progressing Towards Goal 
Note:  
See MAR. Goal: Discharge Planning Outcome: Progressing Towards Goal 
Goal: Respiratory Outcome: Progressing Towards Goal 
Note:  
Lung sounds diminished in the bases. Wears 2L O2 prn for comfort. Currently on CPAP. Goal: Psychosocial 
Outcome: Progressing Towards Goal 
Note:  
Calm, cooperative, pleasant.

## 2019-12-26 NOTE — PROGRESS NOTES
NUTRITION COMPLETE ASSESSMENT 
 
RECOMMENDATIONS:  
1. Increase marinol dose - only getting 1/2 of recommended starting dose with continued poor appetite. 2. Encourage oral intake - family may bring foods from home - please make sure to thicken all liquids including Ensure shakes (2 pkts per shake) Interventions/Plan:  
Food/Nutrient Delivery:  (snacks, preferences noted) Commercial supplement(see below)   (increase marinol dose) (-) Assessment:  
Reason for Assessment: Reassessment Diet: regular, nectar-thick liquids Supplements: Ensure Enlive 4x/day Nutritionally Significant Medications: [x] Reviewed & Includes: allopurinol, retacrit, marinol (2.5mg daily), lexapro (started 12/19), SSI, iron, Bhakti-Q, keppra, mag ox, octreotide, protonix, miralax, KCl, pericolace, spironolactone, carafate, MVI, coumadin; milrinone drip PRN: artifical saliva PRN, zofran Meal intake:  
Patient Vitals for the past 168 hrs: 
 % Diet Eaten 12/26/19 0911 25 % 12/25/19 1858 50 % 12/25/19 1501 50 % 12/25/19 0810 50 % 12/24/19 1131 25 % 12/24/19 0753 100 % 12/23/19 0907 40 % 12/22/19 1332 75 % 12/22/19 0834 25 % 12/20/19 1748 25 % 12/20/19 1302 5 % 12/20/19 1023 10 % 12/19/19 1511 50 % Subjective: \"I open the lid and the smell is too much sometimes. \" Wife: \"But he won't eat foods from home either. \" 
 
Objective: 
Pt admitted for CHF. PMHx: HTN, CKD, chronic systolic heart failure, depression, others noted. S/p removal of impella (placed 10/29) and LVAD placement on 11/18. Octreotide rx with anemia but no GI bleed found. Milrinone drip continues but reduced today. Low sodium and chloride continue but stable. Renal following for JUAN J on CKD. Happy to see plans for [facility] tomorrow since improvement status. Appetite continues to be poor, and now pt not drinking Ensure as often.  Prefers Chocolate so reduced to Ensure Enlive (350kcal 20g protein) and Ensure Compact (220kcal, 9g protein). Smells of food a barrier to PO so discussed mgmt with colder foods and snacks. Agreeable to snacks between meals (chicken salad, cheese/crackers) Marinol in place (remeron discontinued d/t side effects) however only at half of recommended dose - please consider increasing dose since intake remains inadequate. Severe wt loss over past 6 months. Severe muscle/fat wasting. Pt meets criteria for severe malnutrition. Last 3 Recorded Weights in this Encounter 12/24/19 8183 12/25/19 4939 12/26/19 2251 Weight: 79.5 kg (175 lb 4.3 oz) 78.4 kg (172 lb 13.5 oz) 74.7 kg (164 lb 10.9 oz) Estimated Nutrition Needs:  
Kcals/day: 2045 Kcals/day(1887-2045kcal) Protein: 75 g(75-90g (1-1.2g/kg - CKD)) Fluid: 1887 ml(1ml/kcal) Based On: [place] Jeor(x 1.2-1.3) Weight Used: Actual wt(74.7kg) Pt expected to meet estimated nutrient needs:  []   Yes []  No [x] Unable to predict at this time Nutrition Diagnosis: 1. Malnutrition related to CHF vs depression vs malignancy as evidenced by >10% weight loss x 6 months; severe muscle/fat wasting; consuming >70% needs orally 2. Inadequate oral intake related to poor appetite as evidenced by less than 50% most meals consumed; only 1-2 supplements/day 3. Swallowing difficulty(improved) related to weakness with moderate pharyngeal dysphagia as evidenced by regular texture; nectar-thick liquids Goals:   
 Consumption of at least 60% meals and 2-3 snacks/supplements/day in 5-7 days; wt maintenance Monitoring & Evaluation: - Total energy intake, Liquid meal replacement, Protein intake - Weight/weight change Previous Nutrition Goals Met:   No 
Previous Recommendations:    No 
 
Education & Discharge Needs: 
 [x] None Identified 
 [] Identified and addressed [x] Participated in care plan, discharge planning, and/or interdisciplinary rounds Cultural, Yazidi and ethnic food preferences identified: None Skin Integrity: []Intact  [x]Other: sternal wound Edema: []None [x]Other: trace Last BM: 12/25 Food Allergies: [x]None []Other Diet Restrictions: Cultural/Shinto Preference(s): None Anthropometrics:   
Weight Loss Metrics 12/26/2019 10/25/2019 10/25/2019 10/25/2019 9/30/2019 9/18/2019 9/16/2019 Today's Wt 164 lb 10.9 oz - 193 lb 6.4 oz - 199 lb 14.4 oz 196 lb 199 lb BMI - 21.14 kg/m2 - 24.83 kg/m2 25.67 kg/m2 25.16 kg/m2 25.55 kg/m2 Weight Source: Standing scale (comment) Height: 6' 2\" (188 cm), Body mass index is 21.14 kg/m². IBW : 86.2 kg (190 lb), % IBW (Calculated): 96.32 % 
 ,   
 
Labs:   
Lab Results Component Value Date/Time Sodium 132 (L) 12/26/2019 02:48 AM  
 Potassium 4.2 12/26/2019 02:48 AM  
 Chloride 95 (L) 12/26/2019 02:48 AM  
 CO2 32 12/26/2019 02:48 AM  
 Glucose 93 12/26/2019 02:48 AM  
 BUN 27 (H) 12/26/2019 02:48 AM  
 Creatinine 1.52 (H) 12/26/2019 02:48 AM  
 Calcium 8.8 12/26/2019 02:48 AM  
 Magnesium 2.0 12/26/2019 02:48 AM  
 Phosphorus 3.3 11/27/2019 04:18 AM  
 Albumin 2.4 (L) 12/26/2019 02:48 AM  
 
Lab Results Component Value Date/Time Hemoglobin A1c 6.6 (H) 10/25/2019 02:56 PM  
 
 
[place], RD [address] , Pager #[phone] or [phone]

## 2019-12-26 NOTE — PROGRESS NOTES
ID Progress Note 
2019 Subjective:  
 
Doing ok, seems to be feeling stronger Objective: Antibiotics: 1. Levaquin 2. Rifampin 3. Fluconazole 4. Vancomycin 5. Cefazolin 6. Zosyn Vitals:  
Visit Vitals BP 91/72 (BP 1 Location: Left arm, BP Patient Position: At rest) Pulse 80 Temp 97.9 °F (36.6 °C) Resp 16 Ht 6' 2\" (1.88 m) Wt 74.7 kg (164 lb 10.9 oz) SpO2 93% BMI 21.14 kg/m² Tmax:  Temp (24hrs), Av.9 °F (36.6 °C), Min:97.6 °F (36.4 °C), Max:98.1 °F (36.7 °C) Exam:  Lungs:  clear to auscultation bilaterally Heart:  regular rate and rhythm Abdomen:  soft, non-tender. Bowel sounds normal. No masses,  no organomegaly Labs:     
Recent Labs  
  19 
0248 19 
1238 19 
0056 19 
1154 19 
0123 WBC 4.2  --  4.1  --  4.4 HGB 8.2* 8.9* 7.5* 7.5* 7.8*  
  --  179  --  186 BUN 27*  --  33*  --  34* CREA 1.52*  --  1.75*  --  1.80* SGOT 25  --  27  --  26  
  --  119*  --  121* TBILI 0.7  --  0.6  --  0.6 Cultures: No results found for: [facility] Lab Results Component Value Date/Time Culture result: NO GROWTH 5 DAYS 12/15/2019 03:37 PM  
 Culture result: NO GROWTH 1 DAY 12/15/2019 03:30 PM  
 Culture result: NO GROWTH 5 DAYS 2019 11:23 PM  
 
 
Radiology:  
 
Line/Insert Date:        
 
Assessment:  
 
1. UTI--Serratia (2 biotypes) from culture and small amount of Enterococcus 2. CHF/cardiomyopathy 3. ?aspiration event(s) 4. Renal insufficiency 5. Respiratory difficulties Objective: 1. Monitor off antibiotics 2. Work up any new fevers Natacha Colunga MD

## 2019-12-26 NOTE — PROGRESS NOTES
Problem: Mobility Impaired (Adult and Pediatric) Goal: *Acute Goals and Plan of Care (Insert Text) Description FUNCTIONAL STATUS PRIOR TO ADMISSION: Patient was modified independent using a single point cane for functional mobility. Patient reports an increasingly sedentary lifestyle 2* fatigue and SOB/dyspnea. Retired (so is his wife). Patient reports x 3 falls within the last couple of weeks. Patient is wearing either nasal cannula or CPAP at night. LVAD work-up has been initiated. HOME SUPPORT PRIOR TO ADMISSION: The patient lived with his wife, but did not require physical assistance. Physical Therapy Goals Reassessed on 12/26/2019, goals not met but remain appropriate, so carry over Physical Therapy Goals: 
 
Weekly reassessment completed 12/19/2019 and goals downgraded as appropriate. 1.  Patient will move from supine to sit and sit to supine, scoot up and down and roll side to side in bed with contact guard assistance within 7 days. 2.  Patient will perform sit to/from stand with minimal assistance x 1 within 7 days. 3.  Patient will ambulate 25 feet with least restrictive assistive device and moderate assistance within 7 days. 4.  Stair goal to be formulated when appropriate. 5.  Patient will perform cardiac exercises per protocol with supervision within 7 days. 6.  Patient will verbally and functionally recall mindful movement principles (Move in the Tube) with minimal verbal cuing/reminding within 7 days. 7.  Patient will perform power exchange for power module to/from battery with moderate assistance within 7 days. Re-evaluation completed 11/20/2019 and new goals formulated following LVAD implantation. Reviewed and cont 12/3/2019. Reviewed and continued 12/12/2019 1. Patient will move from supine to sit and sit to supine, scoot up and down and roll side to side in bed with minimal assistance/contact guard assist within 7 days. 2.  Patient will perform sit to/from stand with minimal assistance/contact guard assist within 7 days. 3.  Patient will ambulate 150 feet with least restrictive assistive device and minimal assistance/contact guard assist within 7 days. 4.  Patient will ascend/descend 4 stairs with handrail(s) with minimal assistance/contact guard assist within 7 days. 5.  Patient will perform cardiac exercises per protocol with supervision within 7 days. 6.  Patient will verbally and functionally recall 3/3 sternal precautions within 7 days. 7.  Patient will perform power exchange for power module to/from battery with moderate assistance  within 7 days. Initiated 10/27/2019; updated 11/15/2019 1. Patient will move from supine to sit and sit to supine, scoot up and down and roll side to side in bed with independence within 7 days. 2.  Patient will perform sit to/from stand with supervision/set-up within 7 days. 3.  Patient will ambulate 200 feet with least restrictive assistive device and supervision/set-up within 7 days. 4.  Patient will ascend/descend 4 stairs with  handrail(s) with supervision/set-up within 7 days for functional strengthening and community reintegration. Mabeline Sink 5.  Patient will verbally and functionally recall 3/3 sternal precautions within 7 days in preparation for LVAD implantation. 6.  Patient will perform a mock power exchange for power module to/from battery with supervision/set-up within 7 days in preparation for LVAD implantation. 1.  Patient will move from supine to sit and sit to supine, scoot up and down and roll side to side in bed with independence within 7 days. 2.  Patient will perform sit to/from stand with supervision/set-up within 7 days. 3.  Patient will ambulate 150 feet with least restrictive assistive device and supervision/set-up within 7 days.   
4.  Patient will ascend/descend 4 stairs with  handrail(s) with supervision/set-up within 7 days for functional strengthening and community reintegration. Mliss Peace 5.  Patient will verbally and functionally recall 3/3 sternal precautions within 7 days in preparation for LVAD implantation. 6.  Patient will perform a mock power exchange for power module to/from battery with supervision/set-up within 7 days in preparation for LVAD implantation. Outcome: Progressing Towards Goal 
 
PHYSICAL THERAPY TREATMENT WEEKLY REASSESSMENT Patient: Princess Kim (75 y.o. male) Date: 12/26/2019 Diagnosis: Acute decompensated heart failure ([address] [I50.9] <principal problem not specified> Procedure(s) (LRB): LEFT BRACHIAL THROMBECTOMY (Left) 31 Days Post-Op Precautions: Fall, Sternal(LVAD ) Chart, physical therapy assessment, plan of care and goals were reviewed. ASSESSMENT Patient continues with skilled PT services and is very slowly progressing towards goals. He was seen after low MAP reading this am, as low as 58 with nursing while sitting at the EOB and had been symptomatic. He was received in modified chair position and MAP was 72. Once sitting at the EOB he was symptomatic but his MAP was stable at 76. He was transferred to the bedside commode for a BM. The transfer was limited by impaired standing balance and decreased sequencing, is impulsive. After toileting he was transferred to the chair and encouraged him to stand some more and try some amb. He stood and c/o being too dizzy, unable to get a standing MAP. Once returned to the chair he reported feeling better and deferred and further standing/attempts at amb. While working on LVAD vocabulary pt required ongoing review of what line is the drive line. Carryover is poor. He did a change over to battery power with ongoing verbal cues and hands on assist. Again carry over is poor and with limited ability to problem solve.   
 
Progress towards goals: gains are very slow, goals not met, so carried over, limited by all mentioned above Current Level of Function Impacting Discharge (mobility/balance): impaired standing balance, requires assist X 2 for sit <>stand and transfers, no amb this session Other factors to consider for discharge: far from being able to manage LVAD, high fall risk, impaired cognition PLAN : 
Patient continues to benefit from skilled intervention to address the above impairments. Continue treatment per established plan of care. to address goals. Recommendation for discharge: (in order for the patient to meet his/her long term goals) Therapy 3 hours per day 5-7 days per week This discharge recommendation: A follow-up discussion with the attending provider and/or case management is planned IF patient discharges home will need the following DME: to be determined (TBD) SUBJECTIVE:  
Patient stated I have to go to the bathroom.  OBJECTIVE DATA SUMMARY:  
Chart checked, pt cleared by nursing Cardiac diagnosis intervention: 
Pt educated to move in the tube and required ongoing verbal and tactile cues to adhere. Critical Behavior: 
Neurologic State: Alert Orientation Level: Oriented X4 Cognition: Appropriate for age attention/concentration Safety/Judgement: Decreased insight into deficits, Decreased awareness of need for safety, Decreased awareness of need for assistance Functional Mobility Training: 
Bed Mobility: 
  
Supine to Sit: Contact guard assistance;Assist x2; Additional time Transfers: 
Sit to Stand: Moderate assistance;Assist x2; Additional time Stand to Sit: Moderate assistance;Assist x2; Additional time Bed to Chair: Moderate assistance;Assist x2; Additional time(stand pivot from [facility]) Balance: 
Sitting: Without support; Impaired Sitting - Static: Good (unsupported) Sitting - Dynamic: Fair (occasional) Standing: Impaired; With support Standing - Static: Fair;Constant support Standing - Dynamic : Poor;Constant support Ambulation/Gait Training: 
  
  
  
  
  
  
  
  
  
  
  
  
  
  
  
  
  
 None beyond transfers Stairs: VAD power transition Patient performed switchover from power module to battery. Completed the following tasks: 
 Independent Verbal cues Physical assist Comments Don holster vest      
Check batteries Check   x Ensure  clipped onto stabilization belt Position batteries in clips  x  Additional time Disconnect power leads   x Additional time Insert power lead into battery   x Additional time [place] batteries in [employer] Secure batteries with velcro and clips Patient performed switchover from battery to power module. Completed the following tasks: 
 Independent Verbal cues Physical assistance Comments Remove batteries from [employer] Disconnect power leads from battery Reconnect power leads into power module Remove batteries from clips Abby hernandez      
 
T 
CARDIAC EXERCISE Sets Reps Active  Active Assist   
Passive  Self ROM Comments Shoulder flexion  1  5   [x]                            []                             []                             []                               
Shoulder abduction  1  5  [x]                             []                             []                             []                               
Scapular elevation  1  5  [x]                             []                              []                             []                               
Scapular retraction  1  5  [x]                             []                             []                             []                               
Trunk rotation  1  5  [x]                             []                             []                             []                               
 Trunk sidebending  1  5  [x]                             []                              []                             []                                     
 
Pain Rating: 
None rated Activity Tolerance:  
Fair see assessment above Please refer to the flowsheet for vital signs taken during this treatment. After treatment patient left in no apparent distress:  
Sitting in chair, Call bell within reach, and Caregiver / family present COMMUNICATION/COLLABORATION:  
The patients plan of care was discussed with: Occupational Therapist and Registered Nurse Braden Beckham Time Calculation: 50 mins

## 2019-12-26 NOTE — PROGRESS NOTES
0940: driveilne dressing change per protocol, looks good, suture remains. Pt tolerated well. 
1000: neal dcd per order. 1015: Per Girish Hoyos NP okay to obtain 1 pair of BC off off PICC line. 1016: pt placed in chair position in bed, pt instructed to alert RN if feeling dizzy. 1115: pt tolerated chair position, approved to work with PT/OT. [id number]: OOB to chair. 1435: pt VOIDED 50ML WITH ADDITIONAL URINE ON CHUX. Returned back to bed with 2 assist stand pivot, tolerated well. 
1930: Bedside and Verbal shift change report given to connie resendiz rn (oncoming nurse) by kelly ferrara rn (offgoing nurse). Report included the following information SBAR, Kardex, OR Summary, Procedure Summary, Intake/Output, MAR and Recent Results. Problem: Falls - Risk of 
Goal: *Absence of Falls Description Document Arti Lemus Fall Risk and appropriate interventions in the flowsheet. Outcome: Progressing Towards Goal 
Note: Fall Risk Interventions: 
Mobility Interventions: Communicate number of staff needed for ambulation/transfer, Assess mobility with egress test 
 
Mentation Interventions: Door open when patient unattended Medication Interventions: Evaluate medications/consider consulting pharmacy Elimination Interventions: Call light in reach, Patient to call for help with toileting needs History of Falls Interventions: Evaluate medications/consider consulting pharmacy Problem: Patient Education: Go to Patient Education Activity Goal: Patient/Family Education Outcome: Progressing Towards Goal 
  
Problem: Pressure Injury - Risk of 
Goal: *Prevention of pressure injury Description Document Mich Scale and appropriate interventions in the flowsheet. Outcome: Progressing Towards Goal 
Note: Pressure Injury Interventions: 
Sensory Interventions: Assess changes in LOC Moisture Interventions: Absorbent underpads Activity Interventions: Pressure redistribution bed/mattress(bed type) Mobility Interventions: HOB 30 degrees or less, Pressure redistribution bed/mattress (bed type), PT/OT evaluation Nutrition Interventions: Discuss nutritional consult with provider, Offer support with meals,snacks and hydration, Document food/fluid/supplement intake Friction and Shear Interventions: HOB 30 degrees or less, Lift sheet Problem: Patient Education: Go to Patient Education Activity Goal: Patient/Family Education Outcome: Progressing Towards Goal 
  
Problem: LVAD Post-op Day 15 to Discharge Goal: Activity/Safety Outcome: Progressing Towards Goal 
Goal: Consults, if ordered Outcome: Progressing Towards Goal 
Goal: Diagnostic Test/Procedures Outcome: Progressing Towards Goal 
Goal: Nutrition/Diet Outcome: Progressing Towards Goal 
Goal: Medications Outcome: Progressing Towards Goal 
Goal: Discharge Planning Outcome: Progressing Towards Goal 
Goal: Respiratory Outcome: Progressing Towards Goal 
Goal: Treatments/Interventions/Procedures Outcome: Progressing Towards Goal 
Goal: Psychosocial 
Outcome: Progressing Towards Goal

## 2019-12-26 NOTE — PROGRESS NOTES
Urology Progress Note Patient: Arianna Wynne MRN: [id number]  SSN: [id number] YOB: 1950  Age: 71 y.o. Sex: male ADMITTED: 10/25/2019 to Ann Marie Santos MD for [facility] [I50.9] POD# 31 Days Post-Op Procedure(s): LEFT BRACHIAL THROMBECTOMY Urine is clear with CBI off since 6AM. Vitals: Temp (24hrs), Av.9 °F (36.6 °C), Min:97.6 °F (36.4 °C), Max:98.1 °F (36.7 °C) Blood pressure 91/72, pulse 76, temperature 97.9 °F (36.6 °C), resp. rate 16, height 6' 2\" (1.88 m), weight 74.7 kg (164 lb 10.9 oz), SpO2 93 %. Intake and Output: 
 1901 -  0700 In: 89756.4 [P.O.:1500; I.V.:383.4] Out: 98523 Labs: 
Labs:  
Lab Results Component Value Date/Time WBC 4.2 2019 02:48 AM  
 HGB 8.2 (L) 2019 02:48 AM  
 Creatinine 1.52 (H) 2019 02:48 AM  
 
 
 
Assessment/Plan: 
 Remove neal. Straight cath PRN if unable to void. Signed By: Matias Landa MD - 2019

## 2019-12-26 NOTE — PROGRESS NOTES
[facility] VAD/Heart Failure Progress Note Admit Date: 10/25/2019 POD:  31 Days Post-Op Procedure:  Procedure(s): LEFT BRACHIAL THROMBECTOMY Subjective:  
Mild dyspnea, fatigue, and weakness; still very sleepy Objective:  
Vitals: 
Blood pressure 91/72, pulse 80, temperature 97.9 °F (36.6 °C), resp. rate 16, height 6' 2\" (1.88 m), weight 164 lb 10.9 oz (74.7 kg), SpO2 93 %. Temp (24hrs), Av.9 °F (36.6 °C), Min:97.6 °F (36.4 °C), Max:98.1 °F (36.7 °C) Hemodynamics: 
 CO: CO (l/min): 5.8 l/min CI: CI (l/min/m2): 2.8 l/min/m2 CVP: CVP (mmHg): 4 mmHg (19 1645) SVR: SVR (dyne*sec)/cm5: 1080 (dyne*sec)/cm5 (19 1200) PAP Systolic: PAP Systolic: 34 (21/50/33 7979) PAP Diastolic: PAP Diastolic: 13 (35/48/51 1216) PVR:   
 SV02: SVO2 (%): 67 % (19 1300) SCV02: SCVO2 (%): 75 % (10/29/19 1900) VAD Interrogation: LVAD (Heartmate) Pump Speed (RPM): 6400 Pump Flow (LPM): 6 PI (Pulsitility Index): 3 Power: 5.6 MAP: 70  Test: Yes 
Back Up  at Bedside & Labeled: Yes Power Module Test: Yes Driveline Site Care: Yes Driveline Dressing: Changed per order EKG/Rhythm:   
 
Extubation Date / Time:  
 
CT Output:  
 
Ventilator: 
Ventilator Volumes Vt Set (ml): 550 ml (19) Vt Exhaled (Machine Breath) (ml): 639 ml (19 08) Vt Spont (ml): 437 ml (19 1035) Ve Observed (l/min): 7.25 l/min (19 1035) Oxygen Therapy: 
Oxygen Therapy O2 Sat (%): 93 % (19 0743) Pulse via Oximetry: 75 beats per minute (19) O2 Device: Nasal cannula (19) PEEP/CPAP (cm H2O): 2 cm H20 (19 0810) O2 Flow Rate (L/min): 2 l/min (19 112) FIO2 (%): 21 % (19 08) CXR: 
 
Admission Weight: Last Weight Weight: 192 lb 10.9 oz (87.4 kg) Weight: 164 lb 10.9 oz (74.7 kg) Intake / Output / Drain: 
Current Shift: 701 - 1900 In: 839.4 [P.O.:480; I.V.:359.4] Out: - Last 24 hrs.:  
 
Intake/Output Summary (Last 24 hours) at 12/26/2019 1342 Last data filed at 12/26/2019 1200 Gross per 24 hour Intake 44158.3 ml Output 71914 ml Net -1760.7 ml No results for input(s): CPK, CKMB, TROIQ in the last 72 hours. Recent Labs  
  12/26/19 
0248 12/25/19 
1238 12/25/19 
0056  12/24/19 
0123 *  --  131*  --  134* K 4.2  --  4.4  --  4.6 CO2 32  --  34*  --  36*  
BUN 27*  --  33*  --  34* CREA 1.52*  --  1.75*  --  1.80* GLU 93  --  110*  --  118* MG 2.0  --  2.0  --  2.2 WBC 4.2  --  4.1  --  4.4 HGB 8.2* 8.9* 7.5*   < > 7.8* HCT 26.4* 28.5* 24.0*   < > 25.5*  
  --  179  --  186  
 < > = values in this interval not displayed. Recent Labs  
  12/26/19 0248 12/25/19 0056 12/24/19 
0123 INR 1.7* 1.7* 1.8* PTP 17.2* 16.6* 18.1* APTT 37.5* 33.5* 32.1* No lab exists for component: PBNP Current Facility-Administered Medications:  
  milrinone (PRIMACOR) 20 MG/100 ML D5W infusion, 0.125 mcg/kg/min, IntraVENous, CONTINUOUS, Levi, Shana B, NP, Last Rate: 2.8 mL/hr at 12/26/19 0955, 0.125 mcg/kg/min at 12/26/19 6466   warfarin (COUMADIN) tablet 4 mg, 4 mg, Oral, ONCE, Levi, Shana B, NP 
  albumin human 5% (BUMINATE) solution 12.5 g, 12.5 g, IntraVENous, DIALYSIS PRN, Kunaparaju, Logan R, MD 
  senna-docusate (PERICOLACE) 8.6-50 mg per tablet 1 Tab, 1 Tab, Oral, DAILY, Polliard, Bryce Margaret, NP, 1 Tab at 12/25/19 5451   [Held by provider] bumetanide (BUMEX) injection 1 mg, 1 mg, IntraVENous, BID, Polliard, Bryce Margaret, NP, Stopped at 12/25/19 1800   epoetin yvonne-epbx (RETACRIT) injection 20,000 Units, 20,000 Units, SubCUTAneous, Q MON, WED & Valentín Mrarero MD, 20,000 Units at 12/25/19 2104   iron sucrose (VENOFER) 300 mg in 0.9% sodium chloride 250 mL IVPB, 300 mg, IntraVENous, Q24H, Logan Kincaid MD, Last Rate: 176.7 mL/hr at 12/26/19 0910, 300 mg at 12/26/19 6479   levETIRAcetam (KEPPRA) tablet 125 mg, 125 mg, Oral, BID, Sabra Herrera, NP, 125 mg at 12/26/19 4098   dronabinol (MARINOL) capsule 2.5 mg, 2.5 mg, Oral, DAILY, Polliard, Charyl Merlos T, NP, 2.5 mg at 12/25/19 1742   polyethylene glycol (MIRALAX) packet 17 g, 17 g, Oral, DAILY, Polliard, Charyl Merlos T, NP, 17 g at 12/25/19 3510   albuterol-ipratropium (DUO-NEB) 2.5 MG-0.5 MG/3 ML, 3 mL, Nebulization, Q6H PRN, Vinny Batista MD, 3 mL at 12/25/19 1407   therapeutic multivitamin (THERAGRAN) tablet 1 Tab, 1 Tab, Oral, DAILY, Shana Sims NP, 1 Tab at 12/26/19 9106   escitalopram oxalate (LEXAPRO) tablet 10 mg, 10 mg, Oral, QPM, Mounika Altman MD, 10 mg at 12/25/19 1742   potassium chloride SR (KLOR-CON 10) tablet 40 mEq, 40 mEq, Oral, DAILY, Shana Sims NP, 40 mEq at 12/26/19 4241   alteplase (CATHFLO) 1 mg in sterile water (preservative free) 1 mL injection, 1 mg, InterCATHeter, PRN, Mounika Kidd MD, 1 mg at 12/18/19 8205   finasteride (PROSCAR) tablet 5 mg, 5 mg, Oral, DAILY, Sasha Hurtado MD, 5 mg at 12/26/19 2755   spironolactone (ALDACTONE) tablet 25 mg, 25 mg, Oral, DAILY, Mounika Altman MD, 25 mg at 12/26/19 3088   clonazePAM (KlonoPIN) tablet 0.5 mg, 0.5 mg, Oral, TID PRN, Mounika Kidd MD, 0.5 mg at 12/25/19 2248   sildenafil (pulm.hypertension) (REVATIO) tablet 40 mg, 40 mg, Oral, TID, Mounika Altman MD, 40 mg at 12/26/19 1454   magnesium oxide (MAG-OX) tablet 400 mg, 400 mg, Oral, BID, Sabra Herrera NP, 400 mg at 12/26/19 0465   diphenhydrAMINE (BENADRYL) capsule 25 mg, 25 mg, Oral, QHS PRN, Basilia Herrera NP, 25 mg at 12/23/19 2315   octreotide (SANDOSTATIN) injection 25 mcg, 25 mcg, SubCUTAneous, TID, Basilia Herrera NP, 25 mcg at 12/26/19 0910 
  benzocaine-menthol (CEPACOL) lozenge 1 Lozenge, 1 Lozenge, Mucous Membrane, PRN, Sabra Herrera NP 
   digoxin (LANOXIN) tablet 0.0625 mg, 62.5 mcg, Oral, Q MON, WED & FRI, Polliard, Carliss Oneill T, NP, 0.0625 mg at 12/25/19 2104   pantoprazole (PROTONIX) tablet 40 mg, 40 mg, Oral, ACB, Polliard, Carliss Christina T, NP, 40 mg at 12/26/19 0751   allopurinol (ZYLOPRIM) tablet 100 mg, 100 mg, Oral, DAILY, Polliard, Carliss Oneill T, NP, 100 mg at 12/26/19 0850 
  arformoterol (BROVANA) neb solution 15 mcg, 15 mcg, Nebulization, BID RT, 15 mcg at 12/25/19 1909 **AND** budesonide (PULMICORT) 500 mcg/2 ml nebulizer suspension, 500 mcg, Nebulization, BID RT, Polliard, Carliss Christina T, NP, 500 mcg at 12/25/19 1909 
  artificial saliva (MOUTH KOTE) 1 Spray, 1 Spray, Oral, PRN, Sharon, Mariela Costa, NP, 1 Spray at 12/12/19 1452   lactobac ac& pc-s.therm-b.anim (TIAN Q/RISAQUAD), 1 Cap, Oral, DAILY, Tae Santoro MD, 1 Cap at 12/26/19 0850 
  oxyCODONE IR (ROXICODONE) tablet 5 mg, 5 mg, Oral, Q6H PRN, Stephanie Snider MD, 5 mg at 12/22/19 0229 
  balsam peru-castor oil (VENELEX) ointment, , Topical, TID, Giuliano Andrews MD 
  tamsulosin [facility]) capsule 0.4 mg, 0.4 mg, Oral, DAILY, Logan Kincaid MD, 0.4 mg at 12/26/19 0850 
  insulin lispro (HUMALOG) injection, , SubCUTAneous, AC&HS, Shana Sims, NP, Stopped at 12/25/19 1630   Warfarin MD/NP dosing, , Other, PRN, Mounika Altman MD 
  sodium chloride (NS) flush 5-40 mL, 5-40 mL, IntraVENous, Q8H, Stephanie Snider MD, 10 mL at 12/26/19 6741   sodium chloride (NS) flush 5-40 mL, 5-40 mL, IntraVENous, PRN, Stephanie Snider MD, 40 mL at 12/17/19 4500   acetaminophen (TYLENOL) tablet 650 mg, 650 mg, Oral, Q6H PRN, Stephanie Snider MD, 650 mg at 12/19/19 0721 
  naloxone [facility]) injection 0.4 mg, 0.4 mg, IntraVENous, PRN, Stephanie Snider MD 
  ondansetron [facility]) injection 4 mg, 4 mg, IntraVENous, Q4H PRN, Stephanie Snider MD, 4 mg at 12/24/19 1254   bisacodyl (DULCOLAX) tablet 5 mg, 5 mg, Oral, DAILY PRN, Stephanie Snider MD, 5 mg at 12/22/19 9644   sucralfate (CARAFATE) tablet 1 g, 1 g, Oral, AC&HS, Jemma De Anda MD, 1 g at 12/26/19 1226   influenza vaccine 2019-20 (6 mos+)(PF) (FLUARIX/FLULAVAL/FLUZONE QUAD) injection 0.5 mL, 0.5 mL, IntraMUSCular, PRIOR TO DISCHARGE, Mounika Altman MD 
  hydrALAZINE (APRESOLINE) 20 mg/mL injection 20 mg, 20 mg, IntraVENous, Q6H PRN, Shana Sims NP, 20 mg at 12/10/19 0541 
  dextrose 10 % infusion 125-250 mL, 125-250 mL, IntraVENous, PRN, Kendall Govea MD, Stopped at 11/18/19 0700 A/P 
  
S/P LVAD - good flows Need for anti-coagulation - coumadin DM - insulin RV dysfunction - milrinone, diuretics  
  
Risk of morbidity and mortality - high Medical decision making - high complexity Signed By: Rufus Mills MD

## 2019-12-27 NOTE — PROGRESS NOTES
Problem: Falls - Risk of  Goal: *Absence of Falls  Description  Document Joshua Rao Fall Risk and appropriate interventions in the flowsheet. Outcome: Progressing Towards Goal  Note: Fall Risk Interventions:  Mobility Interventions: Communicate number of staff needed for ambulation/transfer, Patient to call before getting OOB, PT Consult for mobility concerns    Mentation Interventions: Door open when patient unattended, Evaluate medications/consider consulting pharmacy, Increase mobility, Room close to nurse's station    Medication Interventions: Patient to call before getting OOB, Teach patient to arise slowly    Elimination Interventions: Call light in reach, Patient to call for help with toileting needs    History of Falls Interventions: Evaluate medications/consider consulting pharmacy         Problem: Patient Education: Go to Patient Education Activity  Goal: Patient/Family Education  Outcome: Progressing Towards Goal     Problem: Diabetes Self-Management  Goal: *Disease process and treatment process  Description  Define diabetes and identify own type of diabetes; list 3 options for treating diabetes. Outcome: Progressing Towards Goal  Goal: *Incorporating nutritional management into lifestyle  Description  Describe effect of type, amount and timing of food on blood glucose; list 3 methods for planning meals. Outcome: Progressing Towards Goal  Goal: *Incorporating physical activity into lifestyle  Description  State effect of exercise on blood glucose levels. Outcome: Progressing Towards Goal  Goal: *Developing strategies to promote health/change behavior  Description  Define the ABC's of diabetes; identify appropriate screenings, schedule and personal plan for screenings. Outcome: Progressing Towards Goal  Goal: *Using medications safely  Description  State effect of diabetes medications on diabetes; name diabetes medication taking, action and side effects.   Outcome: Progressing Towards Goal  Goal: *Monitoring blood glucose, interpreting and using results  Description  Identify recommended blood glucose targets  and personal targets. Outcome: Progressing Towards Goal  Goal: *Prevention, detection, treatment of acute complications  Description  List symptoms of hyper- and hypoglycemia; describe how to treat low blood sugar and actions for lowering  high blood glucose level. Outcome: Progressing Towards Goal  Goal: *Prevention, detection and treatment of chronic complications  Description  Define the natural course of diabetes and describe the relationship of blood glucose levels to long term complications of diabetes. Outcome: Progressing Towards Goal  Goal: *Developing strategies to address psychosocial issues  Description  Describe feelings about living with diabetes; identify support needed and support network  Outcome: Progressing Towards Goal  Goal: *Insulin pump training  Outcome: Progressing Towards Goal  Goal: *Sick day guidelines  Outcome: Progressing Towards Goal  Goal: *Patient Specific Goal (EDIT GOAL, INSERT TEXT)  Outcome: Progressing Towards Goal     Problem: Patient Education: Go to Patient Education Activity  Goal: Patient/Family Education  Outcome: Progressing Towards Goal     Problem: Pain  Goal: *Control of Pain  Outcome: Progressing Towards Goal  Goal: *PALLIATIVE CARE:  Alleviation of Pain  Outcome: Progressing Towards Goal     Problem: Patient Education: Go to Patient Education Activity  Goal: Patient/Family Education  Outcome: Progressing Towards Goal     Problem: Pressure Injury - Risk of  Goal: *Prevention of pressure injury  Description  Document Mich Scale and appropriate interventions in the flowsheet.   Outcome: Progressing Towards Goal  Note: Pressure Injury Interventions:  Sensory Interventions: Assess changes in LOC    Moisture Interventions: Absorbent underpads    Activity Interventions: Increase time out of bed, Pressure redistribution bed/mattress(bed type), PT/OT evaluation    Mobility Interventions: Pressure redistribution bed/mattress (bed type)    Nutrition Interventions: Document food/fluid/supplement intake    Friction and Shear Interventions: HOB 30 degrees or less, Lift sheet                Problem: Patient Education: Go to Patient Education Activity  Goal: Patient/Family Education  Outcome: Progressing Towards Goal     Problem: Patient Education: Go to Patient Education Activity  Goal: Patient/Family Education  Outcome: Progressing Towards Goal     Problem: Patient Education: Go to Patient Education Activity  Goal: Patient/Family Education  Outcome: Progressing Towards Goal     Problem: Nutrition Deficit  Goal: *Optimize nutritional status  Outcome: Progressing Towards Goal

## 2019-12-27 NOTE — PROGRESS NOTES
SLP Contact Note    MBS complete. Patient cleared to advance to thin liquids. Please ensure small sips. Full note to follow.        Thank you,  GILLIAN Ambroiso.Ed, [address] [facility]  Speech-Language Pathologist

## 2019-12-27 NOTE — PROGRESS NOTES
ID Progress Note  2019    Subjective:     Doing ok, seems to be feeling stronger    Objective:     Antibiotics:  1. Levaquin  2. Rifampin   3. Fluconazole  4. Vancomycin    5. Cefazolin   6. Zosyn       Vitals:   Visit Vitals  BP 91/72 (BP 1 Location: Left arm, BP Patient Position: At rest)   Pulse 91   Temp 98 °F (36.7 °C)   Resp 16   Ht 6' 2\" (1.88 m)   Wt 74.9 kg (165 lb 2 oz)   SpO2 97%   BMI 21.20 kg/m²        Tmax:  Temp (24hrs), Av.1 °F (36.7 °C), Min:97.5 °F (36.4 °C), Max:98.6 °F (37 °C)      Exam:  Lungs:  clear to auscultation bilaterally  Heart:  regular rate and rhythm  Abdomen:  soft, non-tender. Bowel sounds normal. No masses,  no organomegaly      Labs:      Recent Labs     19  0410 19  1609 19  0248 19  1238 19  0056   WBC 6.9  --  4.2  --  4.1   HGB 8.7* 8.8* 8.2* 8.9* 7.5*     --  166  --  179   BUN 22*  --  27*  --  33*   CREA 1.40*  --  1.52*  --  1.75*   SGOT 24  --  25  --  27     --  111  --  119*   TBILI 0.7  --  0.7  --  0.6       Cultures:     No results found for: [facility]  Lab Results   Component Value Date/Time    Culture result: NO GROWTH 1 DAY 2019 10:23 AM    Culture result: NO GROWTH 5 DAYS 12/15/2019 03:37 PM    Culture result: NO GROWTH 1 DAY 12/15/2019 03:30 PM       Radiology:     Line/Insert Date:           Assessment:     1. UTI--Serratia (2 biotypes) from culture and small amount of Enterococcus  2. CHF/cardiomyopathy  3. ?aspiration event(s)  4. Renal insufficiency  5. Respiratory difficulties    Objective:     1. Monitor off antibiotics  2. Work up any new fevers  3.  Group will see over the weekend if asked      Kye Paniagua MD

## 2019-12-27 NOTE — PROGRESS NOTES
[facility] in [place], [place]  Inpatient Progress Note      Patient name: Nickie Reddy  Patient : 1950  Patient MRN: [id number]  Attending MD: Vika Ibarra MD  Date of service: 19    Chief Complaint:   Rolando Hodan azucena LVAD implant     HPI: Sona Kiser is a 71y.o. year old pleasant white male with a history of HTN, HLD, JOSE, CAD s/p cardiac arrest VFib s/p CABG () c/b sternal would infection and sternectomy, ischemic cardiomyopathy LVEF 15-20%, s/p ICD and with LBBB. He was admitted with acute on chronic systolic heart failure with massive volume overload > 20 lbs, in the setting of atrial fibrillation s/p failed DCCV and underwent DCCV and RHC on .  S/p BiVICD on 19 with Dr. Rosemarie Rees. He was discharged home home on IV milrinone on 19. [facility] has been followed closely by Dr. Sandra Ring and the [facility].     Mr. Kiser was admitted for acute on chronic systolic heart failure. He underwent implantation of Impella 5.0 due to CS and has completed his LVAD evaluation. [facility] meets criteria for implant of HM3 as DT. He was NYHA Class IV prior to implant of Impella 5.0, has LVEF < 15%, was intolerant of GDMT due to symptomatic hypotension and renal dysfunction. He remains dependent on temporary MCS support. RHC  revealed compensated hemodynamics on Impella. His renal function has improved dramatically with improvement in his hemodynamics. He is not a suitable transplant candidate due to single kidney, sternectomy, debility, and frailty. He was reviewed by Kole Bailey and felt to be a high risk heart transplant candidate due to multiple co-morbidities as well as the afore mentioned conditions.  He remained in the CCU and underwent a HM 3 implant as DT on .   He was weaned off of pressors and transferred to [place] [address] where he continues to undergo PT/OT and hemodynamic optimization.       24Hr Events  Improved orthostatics  hgb stable  Hematuria improved  Bladder spasms    Procedure:  Procedure(s):  REMOVAL TEMPORARY LEFT VENTRICULAR ASSIST DEVICE, IMPLANTATION OF LEFT VENTRICULAR ASSIST DEVICE PERMANENT (VAD), ECC, JACQUE, EPI AORTIC US BY DR Abhilash Zazueta.     POD:  38     Impression / Plan:   Heart Failure Status: NYHA Class IV    Chronic systolic heart failure due to ICM, NYHA Class IV, EF < 15%, cardiogenic shock bridged with Impella 5.0 support to HM 3 implant   S/p Impella removal and LVAD implant 11/18/19  RPMs 6400 rpm - frequent PI events  TTE with bubble study negative for PFO  Continue milrinone 0.125mcg/kg/min- will wean again on Monday   obtain CardioMEMS readings  Resume Diuretics    Cont Sildenafil 40 mg PO TID - rx sent to local pharmacy to initiate PA   Intolerant of BB due to RV failure  Intolerant of ACEi/ARB/ARNI/AA due to JUAN J on CKD 3  Strict I/O, daily weights, Na+ restricted diet   Continue LVAD education   Remove suture from drive line exit site on 12/26 and decrease dressing change frequency to three times weekly  Delayed skin healing on bottom portion of sternotomy - will have CSS PA evaluate  TTE 12/16- EF 15-20%    Generalized myoclonus   Improved   Appreciate Neurology input  Decreased Keppra due to somnolence - 125 mg po BID   Head CT negative for acute process  EEG suggestive of mild generalized encephalopathic process, possibly related to underlying structural brain injury vs metabolic abnormality  Monitor closely      Anticoagulation for LVAD, INR goal 1.5-2  Goal decreased d/t persistent hematuria   No ASA d/t hematuria  INR 1.9  Cont coumadin - 3 mg tonight    Epistaxis  Resolved   Afrin soaked guaze  ENT consult appreciated  Monitor for now      Acute Respiratory Failure post op  Improved with aggressive diuresis  Off supplemental O2  Pulmonary hygiene   Cont home CPAP- must use while sleeping     Acute on CKD 3, atrophic left kidney  Appreciate Nephrology assistance  Watch Cr - improving   Resume diuretics  Albumin today    Avoid nephrotoxins, trend labs   Renally dose meds      CAD s/p CABG   Off ASA d/t hematuria  No BB d/t RV failure  Hold statin due to recent hepatic failure   LHC performed 11/13 - low likelihood of benefit from revascularization      Hx of VF arrest s/p BiV-ICD  No recent shocks  Keep K > 4 and Mg > 2       PAF   Dig level 1.0 -cont dig (62.5 mcg qMWF)  No BB due to RV failure   Repeat TFTs WNL     Hx of gout  Uric acid WNL    Acute blood loss anemia  Likely due to hematuria  Cont octreotide 25 mcg SQ TID   Fecal occult 12/14 negative, positive on 12/17  Appreciate urology recommendations  Hematuria improved  Monitor for now  Will d/w Urology about bladder spasms      Suspected aspiration pneumonia  Sputum culture showing scant growth of yeast  Cefepime complete    Urinary retention, c/b serratia UTI and hematuria  Appreciate Urology consult   Repeat UA with cx negative 12/15   Watch off abx  Cystoscopy performed 11/13 shows catheter induced cystitis     Sepsis Alert  Paired Blood Neg  Repeat paired cultures- pending   Urine cx negative   Sputum cx when able    ESR elevated to 61    Malnutrition   Appreciate Nutritionist consult  Prealbumin weekly - 20.1 on 12/23   Diet as tolerated  Intolerant of mirtazapine d/t tremors, confusion   Decreased Marinol to 2.5 mg PO qPM d/t lethargy  Cont MVI     COPD   Appreciate Pulmonology assistance   Continue nebs PRN       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  No additional treatment at this time     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  Etiology unclear  [facility]  Appreciate neurology assistance      Hx of sternal wound infection, sternectomy  Sternum closed post op- monitor      Vocal cord paralysis  Improved  ENT recs appreciated  Neck CT- R neck edema, no airway compromise   Speech therapy following - appreciate input    Anxiety  Klonipin 0.5 mg TID prn anxiety    Depression  Cont lexapro 10 mg qpm  Monitor QTc - 478 ms on 12/22   Monitor sodium      Debility  Continue PT/OT      Ppx  Protonix   PT/OT Bowel regimen   PICC placed 11/22/19      Dispo: Will need IP rehab. Not ready for discharge at this time        LVAD INTERROGATION:  Device interrogated in person  Device function normal, normal flow, multiple PI events    LVAD   Pump Speed (RPM): 6400  Pump Flow (LPM): 6  MAP: 78  PI (Pulsitility Index): 2.5  Power: 5.7   Test: No  Back Up  at Bedside & Labeled: Yes  Power Module Test: No  Driveline Site Care: No  Driveline Dressing: Clean, Dry, and Intact  Outpatient: No  MAP in Therapeutic Range (Outpatient): Yes  Testing  Alarms Reviewed: Yes  Back up SC speed: 6400  Back up Low Speed Limit: 6000  Emergency Equipment with Patient?: Yes  Emergency procedures reviewed?: No  Drive line site inspected?: No  Drive line intergrity inspected?: Yes  Drive line dressing changed?: No    PHYSICAL EXAM:  Visit Vitals  BP 91/72 (BP 1 Location: Left arm, BP Patient Position: At rest)   Pulse 73   Temp 97.5 °F (36.4 °C)   Resp 16   Ht 6' 2\" (1.88 m)   Wt 165 lb 2 oz (74.9 kg)   SpO2 96%   BMI 21.20 kg/m²       Physical Exam   Constitutional: He is oriented to person, place, and time. He appears well-developed. He appears cachectic. No distress. HENT:   Head: Normocephalic. Neck: Normal range of motion. Neck supple. No JVD present. Cardiovascular: Normal rate, regular rhythm and normal heart sounds. + VAD hum    Pulmonary/Chest: Effort normal and breath sounds normal. No respiratory distress. Abdominal: Soft. Bowel sounds are normal. He exhibits no distension. Musculoskeletal: Normal range of motion. General: No edema. Neurological: He is alert and oriented to person, place, and time. Skin: Skin is warm and dry. Nursing note and vitals reviewed. REVIEW OF SYSTEMS:  Review of Systems   Constitutional: Positive for malaise/fatigue. Negative for chills and fever. HENT: Positive for hearing loss. Negative for nosebleeds.     Respiratory: Negative for cough and shortness of breath. Mild dyspnea   Cardiovascular: Negative for chest pain, palpitations, orthopnea and leg swelling. Gastrointestinal: Negative for heartburn, nausea and vomiting. Genitourinary: Negative for hematuria. Bladder spasms    Musculoskeletal: Negative. Neurological: Positive for weakness. Negative for dizziness and headaches. Endo/Heme/Allergies: Does not bruise/bleed easily.        PAST MEDICAL HISTORY:  Past Medical History:   Diagnosis Date    Degenerative disc disease, lumbar     Heart failure (HCC)     High cholesterol     Hypertension     Paroxysmal atrial fibrillation ([facility] [address] 4/2/2019    Spinal stenosis        PAST SURGICAL HISTORY:  Past Surgical History:   Procedure Laterality Date    COLONOSCOPY Left 11/11/2019    COLONOSCOPY at bedside performed by [facility]a [facility]helan [facility] at [facility] ENDOSCOPY    HX APPENDECTOMY      HX CORONARY ARTERY BYPASS GRAFT      triple    HX HERNIA REPAIR      HX IMPLANTABLE CARDIOVERTER DEFIBRILLATOR      PA CARDIOVERSION ELECTIVE ARRHYTHMIA EXTERNAL N/A 6/10/2019    EP CARDIOVERSION performed by Liz Keene MD at [address] CATH LAB    PA CARDIOVERSION ELECTIVE ARRHYTHMIA EXTERNAL N/A 6/18/2019    EP CARDIOVERSION performed by John Wooten MD at [address] CATH LAB    PA INSJ/RPLCMT PERM DFB W/TRNSVNS LDS 1/DUAL [address] N/A 6/21/2019    INSERT ICD BIV MULTI performed by Sisi Harrell MD at [address] CATH LAB    PA TCAT IMPL WRLS P-ART PRS SNR L-T HEMODYN MNTR N/A 9/18/2019    IMPLANT HEART FAILURE MONITORING DEVICE [place] [address] CATH LAB       FAMILY HISTORY:  Family History   Problem Relation Age of Onset    Lung Disease Mother     Hypertension Mother     Arthritis-osteo Mother     Heart Disease Mother     Heart Disease Father     Diabetes Father        SOCIAL HISTORY:  Social History     Socioeconomic History    Marital status:      Spouse name: Not on file    Number of children: Not on file    Years of education: Not on file    Highest education level: Not on file   Tobacco Use    Smoking status: Former Smoker     Last attempt to quit: 2010     Years since quittin.0    Smokeless tobacco: Never Used   Substance and Sexual Activity    Alcohol use: Not Currently     Comment: rarely    Drug use: Never   Other Topics Concern       LABORATORY RESULTS:     Labs Latest Ref Rng & Units 2019   WBC 4.1 - 11.1 K/uL 6.9 - 4.2 - 4.1 - 4.4   RBC 4.10 - 5.70 M/uL 2.93(L) - 2.82(L) - 2.52(L) - 2.70(L)   Hemoglobin 12.1 - 17.0 g/dL 8.7(L) 8.8(L) 8.2(L) 8. 9(L) 7. 5(L) 7. 5(L) 7. 8(L)   Hematocrit 36.6 - 50.3 % 27. 7(L) 28. 2(L) 26. 4(L) 28. 5(L) 24. 0(L) 24. 8(L) 25. 5(L)   MCV 80.0 - 99.0 FL 94.5 - 93.6 - 95.2 - 94.4   Platelets 313 - 664 K/uL 172 - 166 - 179 - 186   Lymphocytes 12 - 49 % - - - - - - -   Monocytes 5 - 13 % - - - - - - -   Eosinophils 0 - 7 % - - - - - - -   Basophils 0 - 1 % - - - - - - -   Albumin 3.5 - 5.0 g/dL 2. 4(L) - 2. 4(L) - 2. 7(L) - 2. 7(L)   Calcium 8.5 - 10.1 MG/DL 8.5 - 8.8 - 8.8 - 9.0   SGOT 15 - 37 U/L  -    Glucose 65 - 100 mg/dL 98 - 93 - 110(H) - 118(H)   BUN 6 - 20 MG/DL 22(H) - 27(H) - 33(H) - 34(H)   Creatinine 0.70 - 1.30 MG/DL 1.40(H) - 1.52(H) - 1.75(H) - 1.80(H)   Sodium 136 - 145 mmol/L 134(L) - 132(L) - 131(L) - 134(L)   Potassium 3.5 - 5.1 mmol/L 4.4 - 4.2 - 4.4 - 4.6   TSH 0.36 - 3.74 uIU/mL - - - - - - -   LDH 85 - 241 U/L 237 - 208 - 193 - 202   CEA ng/mL - - - - - - -   Some recent data might be hidden     Lab Results   Component Value Date/Time    TSH 2.30 2019 03:29 AM    TSH 2.40 10/25/2019 07:39 PM    TSH 2.45 2019 04:16 AM       ALLERGY:  No Known Allergies     CURRENT MEDICATIONS:  Current Facility-Administered Medications   Medication Dose Route Frequency    warfarin (COUMADIN) tablet 3 mg  3 mg Oral ONCE    albumin human 5% (BUMINATE) solution 12.5 g  12.5 g IntraVENous DAILY    milrinone (PRIMACOR) 20 MG/100 ML D5W infusion  0.125 mcg/kg/min IntraVENous CONTINUOUS    albumin human 5% (BUMINATE) solution 12.5 g  12.5 g IntraVENous DIALYSIS PRN    lidocaine (URO-JET/ GLYDO) 2 % jelly   Urethral PRN    senna-docusate (PERICOLACE) 8.6-50 mg per tablet 1 Tab  1 Tab Oral DAILY    bumetanide (BUMEX) injection 1 mg  1 mg IntraVENous BID    epoetin yvonne-epbx (RETACRIT) injection 20,000 Units  20,000 Units SubCUTAneous Q MON, WED & FRI    iron sucrose (VENOFER) 300 mg in 0.9% sodium chloride 250 mL IVPB  300 mg IntraVENous Q24H    levETIRAcetam (KEPPRA) tablet 125 mg  125 mg Oral BID    dronabinol (MARINOL) capsule 2.5 mg  2.5 mg Oral DAILY    polyethylene glycol (MIRALAX) packet 17 g  17 g Oral DAILY    albuterol-ipratropium (DUO-NEB) 2.5 MG-0.5 MG/3 ML  3 mL Nebulization Q6H PRN    therapeutic multivitamin (THERAGRAN) tablet 1 Tab  1 Tab Oral DAILY    escitalopram oxalate (LEXAPRO) tablet 10 mg  10 mg Oral QPM    potassium chloride SR (KLOR-CON 10) tablet 40 mEq  40 mEq Oral DAILY    alteplase (CATHFLO) 1 mg in sterile water (preservative free) 1 mL injection  1 mg InterCATHeter PRN    finasteride (PROSCAR) tablet 5 mg  5 mg Oral DAILY    spironolactone (ALDACTONE) tablet 25 mg  25 mg Oral DAILY    clonazePAM (KlonoPIN) tablet 0.5 mg  0.5 mg Oral TID PRN    sildenafil (pulm.hypertension) (REVATIO) tablet 40 mg  40 mg Oral TID    magnesium oxide (MAG-OX) tablet 400 mg  400 mg Oral BID    diphenhydrAMINE (BENADRYL) capsule 25 mg  25 mg Oral QHS PRN    octreotide (SANDOSTATIN) injection 25 mcg  25 mcg SubCUTAneous TID    benzocaine-menthol (CEPACOL) lozenge 1 Lozenge  1 Lozenge Mucous Membrane PRN    digoxin (LANOXIN) tablet 0.0625 mg  62.5 mcg Oral Q MON, WED & FRI    pantoprazole (PROTONIX) tablet 40 mg  40 mg Oral ACB    allopurinol (ZYLOPRIM) tablet 100 mg  100 mg Oral DAILY    arformoterol (BROVANA) neb solution 15 mcg  15 mcg Nebulization BID RT    And    budesonide (PULMICORT) 500 mcg/2 ml nebulizer suspension  500 mcg Nebulization BID RT    artificial saliva (MOUTH KOTE) 1 Spray  1 Spray Oral PRN    lactobac ac& pc-s.therm-b.anim (TIAN Q/RISAQUAD)  1 Cap Oral DAILY    oxyCODONE IR (ROXICODONE) tablet 5 mg  5 mg Oral Q6H PRN    balsam peru-castor oil (VENELEX) ointment   Topical TID    tamsulosin (FLOMAX) capsule 0.4 mg  0.4 mg Oral DAILY    insulin lispro (HUMALOG) injection   SubCUTAneous AC&HS    Warfarin MD/NP dosing   Other PRN    sodium chloride (NS) flush 5-40 mL  5-40 mL IntraVENous Q8H    sodium chloride (NS) flush 5-40 mL  5-40 mL IntraVENous PRN    acetaminophen (TYLENOL) tablet 650 mg  650 mg Oral Q6H PRN    naloxone (NARCAN) injection 0.4 mg  0.4 mg IntraVENous PRN    ondansetron (ZOFRAN) injection 4 mg  4 mg IntraVENous Q4H PRN    bisacodyl (DULCOLAX) tablet 5 mg  5 mg Oral DAILY PRN    sucralfate (CARAFATE) tablet 1 g  1 g Oral AC&HS    influenza vaccine 2019-20 (6 mos+)(PF) (FLUARIX/FLULAVAL/FLUZONE QUAD) injection 0.5 mL  0.5 mL IntraMUSCular PRIOR TO DISCHARGE    hydrALAZINE (APRESOLINE) 20 mg/mL injection 20 mg  20 mg IntraVENous Q6H PRN    dextrose 10 % infusion 125-250 mL  125-250 mL IntraVENous PRN         Thank you for allowing me to participate in this patient's care. Jacob Cordon NP  [facility] [address], [address] [facility]  Phone: [phone]  Fax: [phone]    [facility] ATTENDING ADDENDUM    Patient was seen and examined in person. Data and notes were reviewed. I have discussed and agree with the plan as noted in the NP note above without further additions.     Melody Walters MD PhD  Jonathan Larios

## 2019-12-27 NOTE — PROGRESS NOTES
[id number] [address] line dressing change completed per policy. Drive line suture removed with no difficulty. A small amount of drainage noted. Dressing change order to change to 3x week.     Luciano Capellan RN  VAD Coordinator

## 2019-12-27 NOTE — PROGRESS NOTES
Urology Progress Note Patient: Tammie Vidal MRN: [id number]  SSN: [id number] YOB: 1950  Age: 71 y.o. Sex: male ADMITTED: 10/25/2019 to Maryann Thomas MD for [facility] [I50.9] POD# 32 Days Post-Op Procedure(s): LEFT BRACHIAL THROMBECTOMY Voiding frequently and in small amounts. Urine is clear. Vitals: Temp (24hrs), Av.1 °F (36.7 °C), Min:97.7 °F (36.5 °C), Max:98.6 °F (37 °C) Blood pressure 91/72, pulse 77, temperature 97.7 °F (36.5 °C), resp. rate 16, height 6' 2\" (1.88 m), weight 74.7 kg (164 lb 10.9 oz), SpO2 93 %. Intake and Output: 
 0701 -  1900 In: 26873.5 [P.O.:1860; I.V.:695.3] Out: 38038 [Urine:350] Labs: 
Labs:  
Lab Results Component Value Date/Time WBC 6.9 2019 04:10 AM  
 HGB 8.7 (L) 2019 04:10 AM  
 Creatinine 1.40 (H) 2019 04:10 AM  
 
 
 
Assessment/Plan: 
 Hematuria has resolved. Try to avoid straight cath or neal of possible. Signed By: Isaiah Reddy MD - 2019

## 2019-12-27 NOTE — PROGRESS NOTES
0700: patient had an uneventful shift. He has been voiding frequently with  ml output. 0730: Bedside and Verbal shift change report given to Latasha (oncoming nurse) by Pascual Bermudez (offgoing nurse). Report included the following information SBAR, Kardex, OR Summary, Procedure Summary, Intake/Output, MAR, Recent Results and Cardiac Rhythm Paced. Problem: Falls - Risk of 
Goal: *Absence of Falls Description Document Wally Trujillo Fall Risk and appropriate interventions in the flowsheet. Note:  
Fall Risk Interventions: 
Mobility Interventions: Communicate number of staff needed for ambulation/transfer, OT consult for ADLs, Patient to call before getting OOB, PT Consult for mobility concerns, Strengthening exercises (ROM-active/passive) Mentation Interventions: Door open when patient unattended, Familiar objects from home Medication Interventions: Patient to call before getting OOB, Teach patient to arise slowly, Utilize gait belt for transfers/ambulation Elimination Interventions: Call light in reach, Patient to call for help with toileting needs, Toileting schedule/hourly rounds, Urinal in reach History of Falls Interventions: Evaluate medications/consider consulting pharmacy Call phan and personal effects within reach. Bed locked and in low position. Non skid footwear in place. Problem: Pressure Injury - Risk of 
Goal: *Prevention of pressure injury Description Document Mich Scale and appropriate interventions in the flowsheet. Outcome: Progressing Towards Goal 
Note:  
Pressure Injury Interventions: 
Sensory Interventions: Assess changes in LOC Moisture Interventions: Absorbent underpads Activity Interventions: Increase time out of bed, Pressure redistribution bed/mattress(bed type), PT/OT evaluation Mobility Interventions: Pressure redistribution bed/mattress (bed type), PT/OT evaluation, Turn and reposition approx. every two hours(pillow and wedges) Nutrition Interventions: Document food/fluid/supplement intake Friction and Shear Interventions: HOB 30 degrees or less, Lift sheet Skin assessed Q4H; blood glucose controlled

## 2019-12-27 NOTE — PROGRESS NOTES
[facility] VAD/Heart Failure Progress Note    Admit Date: 10/25/2019  POD:  32 Days Post-Op    Procedure:  Procedure(s):  LEFT BRACHIAL THROMBECTOMY        Subjective:   Mild dyspnea, fatigue, and weakness; bladder spasms      Objective:   Vitals:  Blood pressure 91/72, pulse 91, temperature 98 °F (36.7 °C), resp. rate 16, height 6' 2\" (1.88 m), weight 165 lb 2 oz (74.9 kg), SpO2 97 %.   Temp (24hrs), Av.1 °F (36.7 °C), Min:97.5 °F (36.4 °C), Max:98.6 °F (37 °C)    Hemodynamics:   CO: CO (l/min): 5.8 l/min   CI: CI (l/min/m2): 2.8 l/min/m2   CVP: CVP (mmHg): 4 mmHg (19 1645)   SVR: SVR (dyne*sec)/cm5: 1080 (dyne*sec)/cm5 (19 0578)   PAP Systolic: PAP Systolic: 34 (87//23 8908)   PAP Diastolic: PAP Diastolic: 13 (/ 1113)   PVR:     SV02: SVO2 (%): 67 % (19 1300)   SCV02: SCVO2 (%): 75 % (10/29/19 1900)    VAD Interrogation: LVAD (Heartmate)  Pump Speed (RPM): 6400  Pump Flow (LPM): 6  PI (Pulsitility Index): 2.5  Power: 5.7  MAP: 85   Test: Yes  Back Up  at Bedside & Labeled: Yes  Power Module Test: Yes  Driveline Site Care: No  Driveline Dressing: Clean, Dry, and Intact    EKG/Rhythm:      Extubation Date / Time:     CT Output:     Ventilator:  Ventilator Volumes  Vt Set (ml): 550 ml (19)  Vt Exhaled (Machine Breath) (ml): 639 ml (19 08)  Vt Spont (ml): 437 ml (19 1035)  Ve Observed (l/min): 7.25 l/min (19 1035)    Oxygen Therapy:  Oxygen Therapy  O2 Sat (%): 97 % (19 1105)  Pulse via Oximetry: 80 beats per minute (19 2226)  O2 Device: Nasal cannula (19 08)  PEEP/CPAP (cm H2O): 2 cm H20 (19 0810)  O2 Flow Rate (L/min): 2 l/min (19 08)  FIO2 (%): 21 % (19 0823)    CXR:    Admission Weight: Last Weight   Weight: 192 lb 10.9 oz (87.4 kg) Weight: 165 lb 2 oz (74.9 kg)     Intake / Output / Drain:  Current Shift: 701 - 1900  In: 44.8 [I.V.:44.8]  Out: 120 [Urine:120]  Last 24 hrs.:     Intake/Output Summary (Last 24 hours) at 12/27/2019 1129  Last data filed at 12/27/2019 0800  Gross per 24 hour   Intake 1027.77 ml   Output 1220 ml   Net -192.23 ml             No results for input(s): CPK, CKMB, TROIQ in the last 72 hours. Recent Labs     12/27/19  0410 12/26/19  1609 12/26/19  0248  12/25/19  0056   *  --  132*  --  131*   K 4.4  --  4.2  --  4.4   CO2 30  --  32  --  34*   BUN 22*  --  27*  --  33*   CREA 1.40*  --  1.52*  --  1.75*   GLU 98  --  93  --  110*   MG 1.9  --  2.0  --  2.0   WBC 6.9  --  4.2  --  4.1   HGB 8.7* 8.8* 8.2*   < > 7.5*   HCT 27.7* 28.2* 26.4*   < > 24.0*     --  166  --  179    < > = values in this interval not displayed.      Recent Labs     12/27/19  0410 12/26/19  0248 12/25/19 0056   INR 1.9* 1.7* 1.7*   PTP 18.8* 17.2* 16.6*   APTT 38.8* 37.5* 33.5*     No lab exists for component: PBNP        Current Facility-Administered Medications:     warfarin (COUMADIN) tablet 3 mg, 3 mg, Oral, ONCE, Levi, Shana B, NP    albumin human 5% (BUMINATE) solution 12.5 g, 12.5 g, IntraVENous, DAILY, Levi, Shana B, NP, 12.5 g at 12/27/19 0951    milrinone (PRIMACOR) 20 MG/100 ML D5W infusion, 0.125 mcg/kg/min, IntraVENous, CONTINUOUS, Levi, Shana B, NP, Last Rate: 2.8 mL/hr at 12/26/19 0955, 0.125 mcg/kg/min at 12/26/19 0955    albumin human 5% (BUMINATE) solution 12.5 g, 12.5 g, IntraVENous, DIALYSIS PRN, Logan Kincaid MD    lidocaine (URO-JET/ GLYDO) 2 % jelly, , Urethral, PRN, Mounika Altman MD    senna-docusate (PERICOLACE) 8.6-50 mg per tablet 1 Tab, 1 Tab, Oral, DAILY, Preston Herrera, NP, 1 Tab at 12/27/19 0828    bumetanide (BUMEX) injection 1 mg, 1 mg, IntraVENous, BID, Chioma Herrera NP, 1 mg at 12/27/19 0827    epoetin yvonne-epbx (RETACRIT) injection 20,000 Units, 20,000 Units, SubCUTAneous, Q MON, WED & Violet Noe MD, 20,000 Units at 12/25/19 2104    levETIRAcetam (KEPPRA) tablet 125 mg, 125 mg, Oral, BID, Polliard, Rosellen Mineola T, NP, 125 mg at 12/27/19 0827    dronabinol (MARINOL) capsule 2.5 mg, 2.5 mg, Oral, DAILY, Polliard, Rosellen Mineola T, NP, 2.5 mg at 12/26/19 1720    polyethylene glycol (MIRALAX) packet 17 g, 17 g, Oral, DAILY, Polliard, Rin T, NP, 17 g at 12/27/19 1104    albuterol-ipratropium (DUO-NEB) 2.5 MG-0.5 MG/3 ML, 3 mL, Nebulization, Q6H PRN, Marilee Reveles MD, 3 mL at 12/25/19 1407    therapeutic multivitamin (THERAGRAN) tablet 1 Tab, 1 Tab, Oral, DAILY, LeviShana metzger, NP, 1 Tab at 12/27/19 0827    escitalopram oxalate (LEXAPRO) tablet 10 mg, 10 mg, Oral, QPM, Mounika Altman MD, 10 mg at 12/26/19 1720    potassium chloride SR (KLOR-CON 10) tablet 40 mEq, 40 mEq, Oral, DAILY, Shana Sims NP, 40 mEq at 12/27/19 0827    alteplase (CATHFLO) 1 mg in sterile water (preservative free) 1 mL injection, 1 mg, InterCATHeter, PRN, Mounika Serra MD, 1 mg at 12/18/19 2355    finasteride (PROSCAR) tablet 5 mg, 5 mg, Oral, DAILY, Bryon Blackwood MD, 5 mg at 12/27/19 0827    spironolactone (ALDACTONE) tablet 25 mg, 25 mg, Oral, DAILY, Mounika Altman MD, 25 mg at 12/27/19 0827    clonazePAM (KlonoPIN) tablet 0.5 mg, 0.5 mg, Oral, TID PRN, Mounika Serra MD, 0.5 mg at 12/26/19 2358    sildenafil (pulm.hypertension) (REVATIO) tablet 40 mg, 40 mg, Oral, TID, Mounika Altman MD, 40 mg at 12/27/19 0827    magnesium oxide (MAG-OX) tablet 400 mg, 400 mg, Oral, BID, Cailin Herrera NP, 400 mg at 12/27/19 0827    diphenhydrAMINE (BENADRYL) capsule 25 mg, 25 mg, Oral, QHS PRN, Rin Herrera NP, 25 mg at 12/23/19 2315    octreotide (SANDOSTATIN) injection 25 mcg, 25 mcg, SubCUTAneous, TID, Cailin Herrera NP, 25 mcg at 12/27/19 1104    benzocaine-menthol (CEPACOL) lozenge 1 Lozenge, 1 Lozenge, Mucous Membrane, PRN, Bart Herrera NP    digoxin (LANOXIN) tablet 0.0625 mg, 62.5 mcg, Oral, Q MON, WED & FRI, Bart Herrera NP, 0.0625 mg at 12/25/19 2104    pantoprazole (PROTONIX) tablet 40 mg, 40 mg, Oral, ACB, Polliard, Anil Sportsman T, NP, 40 mg at 12/27/19 0758    allopurinol (ZYLOPRIM) tablet 100 mg, 100 mg, Oral, DAILY, Polliard, Anil Sportsman T, NP, 100 mg at 12/27/19 0827    arformoterol (BROVANA) neb solution 15 mcg, 15 mcg, Nebulization, BID RT, 15 mcg at 12/27/19 1128 **AND** budesonide (PULMICORT) 500 mcg/2 ml nebulizer suspension, 500 mcg, Nebulization, BID RT, Polliard, Anil Sportsman T, NP, 500 mcg at 12/27/19 1128    artificial saliva (MOUTH KOTE) 1 Spray, 1 Spray, Oral, PRN, Polliard, Bryce Margaret, NP, 1 Spray at 12/12/19 1452    lactobac ac& pc-s.therm-b.anim (TIAN Q/RISAQUAD), 1 Cap, Oral, DAILY, Nicole Goemz MD, 1 Cap at 12/27/19 0827    oxyCODONE IR (ROXICODONE) tablet 5 mg, 5 mg, Oral, Q6H PRN, Danielle Chakraborty MD, 5 mg at 12/22/19 0229    balsam peru-castor oil (VENELEX) ointment, , Topical, TID, Andreea Andrews MD    [facility]) capsule 0.4 mg, 0.4 mg, Oral, DAILY, Logan Kincaid MD, 0.4 mg at 12/27/19 0827    insulin lispro (HUMALOG) injection, , SubCUTAneous, AC&HS, Shana Sims NP, Stopped at 12/25/19 1630    Warfarin MD/NP dosing, , Other, PRN, Mounika Altman MD    sodium chloride (NS) flush 5-40 mL, 5-40 mL, IntraVENous, Q8H, Harshal Mahmood MD, 40 mL at 12/27/19 0758    sodium chloride (NS) flush 5-40 mL, 5-40 mL, IntraVENous, PRN, Danielle Chakraborty MD, 20 mL at 12/27/19 0759    acetaminophen (TYLENOL) tablet 650 mg, 650 mg, Oral, Q6H PRN, Danielle Chakraborty MD, 650 mg at 12/26/19 8668    naloxone (NARCAN) injection 0.4 mg, 0.4 mg, IntraVENous, PRN, Danielle Chakraborty MD    ondansetron [facility]) injection 4 mg, 4 mg, IntraVENous, Q4H PRN, Danielle Chakraborty MD, 4 mg at 12/24/19 1254    bisacodyl (DULCOLAX) tablet 5 mg, 5 mg, Oral, DAILY PRN, Danielle Chakraborty MD, 5 mg at 12/22/19 1533    sucralfate (CARAFATE) tablet 1 g, 1 g, Oral, AC&HS, Danielle Chakraborty MD, 1 g at 12/27/19 0757   influenza vaccine 2019-20 (6 mos+)(PF) (FLUARIX/FLULAVAL/FLUZONE QUAD) injection 0.5 mL, 0.5 mL, IntraMUSCular, PRIOR TO DISCHARGE, Amanda Altman MD    hydrALAZINE (APRESOLINE) 20 mg/mL injection 20 mg, 20 mg, IntraVENous, Q6H PRN, Levi, Shana B, NP, 20 mg at 12/10/19 0541    dextrose 10 % infusion 125-250 mL, 125-250 mL, IntraVENous, PRN, Mounika Saravia MD, Stopped at 11/18/19 0700    A/P     S/P LVAD - good flows  Need for anti-coagulation - coumadin  DM - insulin  RV dysfunction - milrinone, diuretics      Risk of morbidity and mortality - high  Medical decision making - high complexity    Signed By: Usama Cordon MD

## 2019-12-27 NOTE — PROGRESS NOTES
[facility] 
 [address] [facility], [address] [facility], [facility] Phone: [phone]   [phone]   
  
Nephrology Progress Note Sona Kiser     1950     [id number] Date of Admission : 10/25/2019 
12/27/19 CC:  Follow up for JUAN J, CKD, Hyponatremia Assessment and Plan JUAN J on CKD: 
- stable 
- resume Diuretics - Milrinone being weaned Hyponatremia : 
- 2/2 CHF vs fluid overload / water retention from CBI  
-Sodium stable at 132 Gross hematuria, BPH w/ retention: 
- off CBI pre VAD. cysto pre op showed catheter related Cystitis @ postr wall  
-CBI stopped and Neal removed 12/26 
-Continue with Flomax and Proscar 
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  
- EF 16-20%, NYHA Class IV , hx of VF arrest - s/p AICD 
- Impella insertion 10/29- removed 11/18  
- s/p LVAD placement on 11/18 
- s/p right thoracentesis. CT in place L arm clot s/p thrombectomy on 11/25 JOSE on CPAP  
  
PAF s/p DCCV 6/19 
  
Anemia: 
- from blood loss s/p multiple blood products - s/p IV iron,  Repeat iron studies ok -  IV iron and increased Epogen ordered 12/25 Interval History:   
Voided since neal removal - No blood per nursing staff Small volume and frequently Nursing staff aware about avoiding re-inserting neal or straight cath SOB +, cough + Review of Systems: as per HPI Current Medications:  
Current Facility-Administered Medications Medication Dose Route Frequency  milrinone (PRIMACOR) 20 MG/100 ML D5W infusion  0.125 mcg/kg/min IntraVENous CONTINUOUS  
 albumin human 5% (BUMINATE) solution 12.5 g  12.5 g IntraVENous DIALYSIS PRN  
 lidocaine (URO-JET/ GLYDO) 2 % jelly   Urethral PRN  
 senna-docusate (PERICOLACE) 8.6-50 mg per tablet 1 Tab  1 Tab Oral DAILY  bumetanide (BUMEX) injection 1 mg  1 mg IntraVENous BID  
  epoetin yvonne-epbx (RETACRIT) injection 20,000 Units  20,000 Units SubCUTAneous Q MON, WED & FRI  iron sucrose (VENOFER) 300 mg in 0.9% sodium chloride 250 mL IVPB  300 mg IntraVENous Q24H  
 levETIRAcetam (KEPPRA) tablet 125 mg  125 mg Oral BID  dronabinol (MARINOL) capsule 2.5 mg  2.5 mg Oral DAILY  polyethylene glycol (MIRALAX) packet 17 g  17 g Oral DAILY  albuterol-ipratropium (DUO-NEB) 2.5 MG-0.5 MG/3 ML  3 mL Nebulization Q6H PRN  therapeutic multivitamin (THERAGRAN) tablet 1 Tab  1 Tab Oral DAILY  escitalopram oxalate (LEXAPRO) tablet 10 mg  10 mg Oral QPM  
 potassium chloride SR (KLOR-CON 10) tablet 40 mEq  40 mEq Oral DAILY  alteplase (CATHFLO) 1 mg in sterile water (preservative free) 1 mL injection  1 mg InterCATHeter PRN  finasteride (PROSCAR) tablet 5 mg  5 mg Oral DAILY  spironolactone (ALDACTONE) tablet 25 mg  25 mg Oral DAILY  clonazePAM (KlonoPIN) tablet 0.5 mg  0.5 mg Oral TID PRN  
 sildenafil (pulm.hypertension) (REVATIO) tablet 40 mg  40 mg Oral TID  magnesium oxide (MAG-OX) tablet 400 mg  400 mg Oral BID  diphenhydrAMINE (BENADRYL) capsule 25 mg  25 mg Oral QHS PRN  
 octreotide (SANDOSTATIN) injection 25 mcg  25 mcg SubCUTAneous TID  benzocaine-menthol (CEPACOL) lozenge 1 Lozenge  1 Lozenge Mucous Membrane PRN  
 digoxin (LANOXIN) tablet 0.0625 mg  62.5 mcg Oral Q MON, WED & FRI  pantoprazole (PROTONIX) tablet 40 mg  40 mg Oral ACB  allopurinol (ZYLOPRIM) tablet 100 mg  100 mg Oral DAILY  arformoterol (BROVANA) neb solution 15 mcg  15 mcg Nebulization BID RT And  
 budesonide (PULMICORT) 500 mcg/2 ml nebulizer suspension  500 mcg Nebulization BID RT  
 artificial saliva (MOUTH KOTE) 1 Spray  1 Spray Oral PRN  
 lactobac ac& pc-s.therm-b.anim (TIAN Q/RISAQUAD)  1 Cap Oral DAILY  oxyCODONE IR (ROXICODONE) tablet 5 mg  5 mg Oral Q6H PRN  
 balsam peru-castor oil (VENELEX) ointment   Topical TID  tamsulosin (FLOMAX) capsule 0.4 mg  0.4 mg Oral DAILY  insulin lispro (HUMALOG) injection   SubCUTAneous AC&HS  Warfarin MD/NP dosing   Other PRN  
 sodium chloride (NS) flush 5-40 mL  5-40 mL IntraVENous Q8H  
 sodium chloride (NS) flush 5-40 mL  5-40 mL IntraVENous PRN  
 acetaminophen (TYLENOL) tablet 650 mg  650 mg Oral Q6H PRN  
 naloxone (NARCAN) injection 0.4 mg  0.4 mg IntraVENous PRN  
 ondansetron (ZOFRAN) injection 4 mg  4 mg IntraVENous Q4H PRN  
 bisacodyl (DULCOLAX) tablet 5 mg  5 mg Oral DAILY PRN  
 sucralfate (CARAFATE) tablet 1 g  1 g Oral AC&HS  influenza vaccine 2019-20 (6 mos+)(PF) (FLUARIX/FLULAVAL/FLUZONE QUAD) injection 0.5 mL  0.5 mL IntraMUSCular PRIOR TO DISCHARGE  hydrALAZINE (APRESOLINE) 20 mg/mL injection 20 mg  20 mg IntraVENous Q6H PRN  
 dextrose 10 % infusion 125-250 mL  125-250 mL IntraVENous PRN No Known Allergies Objective: 
Vitals:   
Vitals:  
 12/26/19 2315 12/27/19 0418 12/27/19 0659 12/27/19 0800 BP:      
Pulse: 83 77  73 Resp: 18 16  16 Temp: 98.2 °F (36.8 °C) 97.7 °F (36.5 °C)  97.5 °F (36.4 °C) SpO2: 99% 93%  96% Weight:   74.9 kg (165 lb 2 oz) Height:      
 
Intake and Output: 
No intake/output data recorded. 12/25 1901 - 12/27 0700 In: 65.7 [P.O.:1680; I.V.:370.6] Out: 83295 [Urine:1100] Physical Examination: 
 
General: Elderly man in no acute distress Neck:  No lines Resp:  TA 
CV:  VAD sounds; No LE edema GI:  Soft and non-tender; no distension Neurologic:  Alert and appropriate; normal speech :  No Lizama [x]    High complexity decision making was performed 
[x]    Patient is at high-risk of decompensation with multiple organ involvement Lab Data Personally Reviewed: I have reviewed all the pertinent labs, microbiology data and radiology studies during assessment. Recent Labs  
  12/27/19 
0410 12/26/19 
0248 12/25/19 
0056 * 132* 131* K 4.4 4.2 4.4  
CL 98 95* 93* CO2 30 32 34* GLU 98 93 110* BUN 22* 27* 33* CREA 1.40* 1.52* 1.75* CA 8.5 8.8 8.8 MG 1.9 2.0 2.0 ALB 2.4* 2.4* 2.7* SGOT 24 25 27 ALT 18 19 20 INR 1.9* 1.7* 1.7* Recent Labs  
  12/27/19 
0410 12/26/19 
1609 12/26/19 
0248 12/25/19 
1238 12/25/19 
0056 WBC 6.9  --  4.2  --  4.1 HGB 8.7* 8.8* 8.2* 8.9* 7.5* HCT 27.7* 28.2* 26.4* 28.5* 24.0*  
  --  166  --  179 No results found for: SDES Lab Results Component Value Date/Time Culture result: NO GROWTH AFTER 19 HOURS 12/26/2019 10:23 AM  
 Culture result: NO GROWTH 5 DAYS 12/15/2019 03:37 PM  
 Culture result: NO GROWTH 1 DAY 12/15/2019 03:30 PM  
 Culture result: NO GROWTH 5 DAYS 12/06/2019 11:23 PM  
 Culture result: HEAVY ENTEROCOCCUS SPECIES NOTED (A) 11/27/2019 11:10 AM  
 Culture result: LIGHT YEAST (A) 11/27/2019 11:10 AM  
 
Recent Results (from the past 24 hour(s)) CULTURE, BLOOD, PAIRED Collection Time: 12/26/19 10:23 AM  
Result Value Ref Range Special Requests: NO SPECIAL REQUESTS Culture result: NO GROWTH AFTER 19 HOURS    
SED RATE (ESR) Collection Time: 12/26/19 10:23 AM  
Result Value Ref Range Sed rate, automated 61 (H) 0 - 20 mm/hr GLUCOSE, POC Collection Time: 12/26/19 12:17 PM  
Result Value Ref Range Glucose (POC) 110 (H) 65 - 100 mg/dL Performed by Fidelina Bartholomew HGB & HCT Collection Time: 12/26/19  4:09 PM  
Result Value Ref Range HGB 8.8 (L) 12.1 - 17.0 g/dL HCT 28.2 (L) 36.6 - 50.3 % GLUCOSE, POC Collection Time: 12/26/19  4:36 PM  
Result Value Ref Range Glucose (POC) 115 (H) 65 - 100 mg/dL Performed by Joshua Bowman GLUCOSE, POC Collection Time: 12/26/19  9:24 PM  
Result Value Ref Range Glucose (POC) 126 (H) 65 - 100 mg/dL Performed by Mehdi Oviedo METABOLIC PANEL, COMPREHENSIVE Collection Time: 12/27/19  4:10 AM  
Result Value Ref Range Sodium 134 (L) 136 - 145 mmol/L  Potassium 4.4 3.5 - 5.1 mmol/L  
 Chloride 98 97 - 108 mmol/L  
 CO2 30 21 - 32 mmol/L Anion gap 6 5 - 15 mmol/L Glucose 98 65 - 100 mg/dL BUN 22 (H) 6 - 20 MG/DL Creatinine 1.40 (H) 0.70 - 1.30 MG/DL  
 BUN/Creatinine ratio 16 12 - 20 GFR est AA >60 >60 ml/min/1.73m2 GFR est non-AA 50 (L) >60 ml/min/1.73m2 Calcium 8.5 8.5 - 10.1 MG/DL Bilirubin, total 0.7 0.2 - 1.0 MG/DL  
 ALT (SGPT) 18 12 - 78 U/L  
 AST (SGOT) 24 15 - 37 U/L Alk. phosphatase 110 45 - 117 U/L Protein, total 6.2 (L) 6.4 - 8.2 g/dL Albumin 2.4 (L) 3.5 - 5.0 g/dL Globulin 3.8 2.0 - 4.0 g/dL A-G Ratio 0.6 (L) 1.1 - 2.2 MAGNESIUM Collection Time: 12/27/19  4:10 AM  
Result Value Ref Range Magnesium 1.9 1.6 - 2.4 mg/dL CBC W/O DIFF Collection Time: 12/27/19  4:10 AM  
Result Value Ref Range WBC 6.9 4.1 - 11.1 K/uL  
 RBC 2.93 (L) 4.10 - 5.70 M/uL HGB 8.7 (L) 12.1 - 17.0 g/dL HCT 27.7 (L) 36.6 - 50.3 % MCV 94.5 80.0 - 99.0 FL  
 MCH 29.7 26.0 - 34.0 PG  
 MCHC 31.4 30.0 - 36.5 g/dL  
 RDW 17.2 (H) 11.5 - 14.5 % PLATELET 953 008 - 485 K/uL MPV 9.2 8.9 - 12.9 FL  
 NRBC 0.0 0  WBC ABSOLUTE NRBC 0.00 0.00 - 0.01 K/uL PROTHROMBIN TIME + INR Collection Time: 12/27/19  4:10 AM  
Result Value Ref Range INR 1.9 (H) 0.9 - 1.1 Prothrombin time 18.8 (H) 9.0 - 11.1 sec PTT Collection Time: 12/27/19  4:10 AM  
Result Value Ref Range aPTT 38.8 (H) 22.1 - 32.0 sec  
 aPTT, therapeutic range     58.0 - 77.0 SECS  
LD Collection Time: 12/27/19  4:10 AM  
Result Value Ref Range  85 - 241 U/L  
NT-PRO BNP Collection Time: 12/27/19  4:10 AM  
Result Value Ref Range NT pro-BNP 4,953 (H) <125 PG/ML  
DIGOXIN Collection Time: 12/27/19  4:13 AM  
Result Value Ref Range Digoxin level 1.0 0.90 - 2.00 NG/ML  
LACTIC ACID Collection Time: 12/27/19  4:13 AM  
Result Value Ref Range Lactic acid 0.6 0.4 - 2.0 MMOL/L  
PROCALCITONIN  Collection Time: 12/27/19  4:13 AM  
 Result Value Ref Range Procalcitonin 0.05 ng/mL GLUCOSE, POC Collection Time: 12/27/19  6:52 AM  
Result Value Ref Range Glucose (POC) 111 (H) 65 - 100 mg/dL Performed by Vasile Roberson (CHAU) Babita Pugh MD

## 2019-12-27 NOTE — PROGRESS NOTES
Problem: Self Care Deficits Care Plan (Adult)  Goal: *Acute Goals and Plan of Care (Insert Text)  Description    FUNCTIONAL STATUS PRIOR TO ADMISSION: Patient was modified independent using a single point cane for functional mobility. Patient able to shower and dress himself. However, patient required assistance for household management from his wife. HOME SUPPORT: The patient lived alone with wife to provide assistance. Occupational Therapy Goals:    Goals reviewed and continued/modified as follows 12/26/2019  1. Patient will perform upper body dressing moderate assistance within 7 day(s) including management of LVAD. 2.  Patient will perform lower body dressing with minimal assistance within 7 day(s). (able to [place] socks in bed, needs MOD A for dynamic standing balance)  3. Patient will perform grooming seated EOB with supervision/setup within 7 day(s). 4.  Patient will perform toilet transfers with minimum assistance within 7 day(s). -CONTINUE  5. Patient will perform all aspects of toileting with moderate assistance within 7 day(s). -CONTINUE  6. Patient will participate in upper extremity therapeutic exercise/activities with supervision/set-up-min A for 5 minutes within 7 day(s). -GOAL MET (discontinue)  7. Patient will utilize energy conservation techniques during functional activities with verbal cues within 7 day(s). 8. Patient will verbalize 3/5 LVAD components with min verbal cues within 7 day (s). -CONTINUE    Goals reviewed and continued 12/19/2019  OT weekly reassessment 12/6/19: goals modified  1. Patient will perform upper body dressing moderate assistance within 7 day(s). 2.  Patient will perform lower body dressing with moderate assistance within 7 day(s). 3.  Patient will perform grooming seated EOB with minimum assistance within 7 day(s). 4.  Patient will perform toilet transfers with minimum assistance within 7 day(s).   5.  Patient will perform all aspects of toileting with moderate assistance within 7 day(s). 6.  Patient will participate in upper extremity therapeutic exercise/activities with supervision/set-up-min A for 5 minutes within 7 day(s). 7.  Patient will utilize energy conservation techniques during functional activities with verbal cues within 7 day(s). 8. Patient will verbalize 3/5 LVAD components with min verbal cues within 7 day (s). OT weekly reassessment 11/29/19: goals modified below  1. Patient will perform upper body dressing including LVAD switchovers with moderate assistance within 7 day(s). 2.  Patient will perform lower body dressing with minimal assistance within 7 day(s). 3.  Patient will perform grooming in standing with minimum assistance within 7 day(s). 4.  Patient will perform toilet transfers with minimum assistance within 7 day(s). 5.  Patient will perform all aspects of toileting with minimal assistance within 7 day(s). 6.  Patient will participate in upper extremity therapeutic exercise/activities with supervision/set-up for 5 minutes within 7 day(s). 7.  Patient will utilize energy conservation techniques during functional activities with verbal cues within 7 day(s). 8. Patient will verbalize LVAD terminology with verbal cues within 7 day (s). Goals reviewed and continued/modified as follows 11/20/19 s/p LVAD implantation  1. Patient will perform upper body dressing including LVAD switchovers with moderate assistance within 7 day(s). 2.  Patient will perform lower body dressing with minimal assistance within 7 day(s). 3.  Patient will perform grooming with supervision/setup within 7 day(s). 4.  Patient will perform toilet transfers supervision/setupmodified independence within 7 day(s). 5.  Patient will perform all aspects of toileting with minimal assistance within 7 day(s). 6.  Patient will participate in upper extremity therapeutic exercise/activities with supervision/set-up for 5 minutes within 7 day(s).     7. Patient will utilize energy conservation techniques during functional activities with verbal cues within 7 day(s). 8. Patient will verbalize LVAD terminology with verbal cues within 7 day (s). Goals reviewed and continued/modified as follows 11/12/19  1. Patient will perform bathing with supervision/set-up within 7 day(s). 2.  Patient will perform lower body dressing with supervision/set-up within 7 day(s). 3.  Patient will perform grooming with modified independence within 7 day(s). 4.  Patient will perform toilet transfers with modified independence within 7 day(s). 5.  Patient will perform all aspects of toileting with supervision/set-up within 7 day(s). 6.  Patient will participate in upper extremity therapeutic exercise/activities with supervision/set-up for 5 minutes within 7 day(s). 7.  Patient will utilize energy conservation techniques during functional activities with verbal cues within 7 day(s). 8. Patient will verbalize LVAD terminology with verbal cues within 7 day (s) in preparation for implant. Continue all goals 10/30/19 post re-eval for Impella removal   Initiated 10/28/2019  1. Patient will perform bathing with supervision/set-up within 7 day(s). 2.  Patient will perform lower body dressing with supervision/set-up within 7 day(s). 3.  Patient will perform grooming with modified independence within 7 day(s). 4.  Patient will perform toilet transfers with modified independence within 7 day(s). 5.  Patient will perform all aspects of toileting with supervision/set-up within 7 day(s). 6.  Patient will participate in upper extremity therapeutic exercise/activities with supervision/set-up for 5 minutes within 7 day(s). 7.  Patient will utilize energy conservation techniques during functional activities with verbal cues within 7 day(s).                   Outcome: Progressing Towards Goal   OCCUPATIONAL THERAPY TREATMENT  Patient: Matt Bailey (75 y.o. male)  Date: 12/27/2019  Diagnosis: Acute decompensated heart failure ([facility] [address] [I50.9]   <principal problem not specified>  Procedure(s) (LRB):  LEFT BRACHIAL THROMBECTOMY (Left) 32 Days Post-Op  Precautions: Fall, Sternal(LVAD )  Chart, occupational therapy assessment, plan of care, and goals were reviewed. ASSESSMENT  Patient continues with skilled OT services and is progressing towards goals. Patient largely MOD-MIN A today for stand pivot transfers and setup for utilizing urinal. Patient completing LVAD switchover today with MIN A and min verbal cues for sequencing (huge improvement). Patient with MAPs 78 at initiation of session however dropping to 70 following functional transfer today (continues with orthostatic hypotension; symptomatic with patient complaining of dizziness/lightheadedness). Continue to recommend IPR to maximize patient safety and independence with ADL transfers and tasks. Current Level of Function Impacting Discharge (ADLs): MIN A UB ADLs with LVAD switchover; MOD A for stand pivot transfers > BSC; MAX A for toileting     Other factors to consider for discharge: safety awareness         PLAN :  Patient continues to benefit from skilled intervention to address the above impairments. Continue treatment per established plan of care. to address goals. Recommend with staff: OOB x 3 to chair; BUE cardiac exercises     Recommend next OT session: standing ADLs    Recommendation for discharge: (in order for the patient to meet his/her long term goals)  Therapy 3 hours per day 5-7 days per week    This discharge recommendation:  Has been made in collaboration with the attending provider and/or case management    IF patient discharges home will need the following DME: to be determined (TBD)       SUBJECTIVE:   Patient stated I am so tired.     OBJECTIVE DATA SUMMARY:   Cognitive/Behavioral Status:  Neurologic State: Alert  Orientation Level: Oriented X4  Cognition: Appropriate for age attention/concentration  Perception: Appears intact  Perseveration: No perseveration noted  Safety/Judgement: Decreased insight into deficits; Decreased awareness of need for safety;Decreased awareness of need for assistance    Functional Mobility and Transfers for ADLs:  Bed Mobility:  Supine to Sit: Stand-by assistance  Sit to Supine: Contact guard assistance    Transfers:  Sit to Stand: Moderate assistance;Assist x1;Additional time     Bed to Chair: Minimum assistance;Assist x1;Additional time    Balance:  Sitting: Impaired; Without support  Standing: Impaired; Without support  Standing - Static: Fair  Standing - Dynamic : Poor    ADL Intervention:     Patient demonstrated switchover from power module to battery in prep for ADL routine with MIN AKit Demonstrated the following tasks:    Independent Verbal cues Physical assistance Comments   Check PM & SC  x     Ensure  clipped onto stabilization belt  x     Remove front (green lighted) batteries from , place back batteries into front slots   x    Check batteries  x     Position batteries into battery clips x      Don holster vest    Did not have   Disconnect power leads  x     Insert power lead into battery clip  x     Fults batteries in holster    Did not have   Secure batteries with Velcro and clips    Did not have                     Upper Body Dressing Assistance  Dressing Assistance: Minimum assistance         Toileting  Toileting Assistance: Set-up(for urinal)    Cognitive Retraining  Safety/Judgement: Decreased insight into deficits; Decreased awareness of need for safety;Decreased awareness of need for assistance    Increase activity tolerance for home, work, and sexual intercourse by pacing self with increasing the arm exercises, sitting duration, frequency OOB, walking, standing, and ADLs. Instructed and indicated understanding of s/s of too much activity, how to respond to s/s safely.       Activity Tolerance:   Fair and requires rest breaks  Please refer to the flowsheet for vital signs taken during this treatment.     After treatment patient left in no apparent distress:   Sitting in chair and Call bell within reach    COMMUNICATION/COLLABORATION:   The patients plan of care was discussed with: Physical Therapist and Registered Nurse    Michelle Irwin  Time Calculation: 31 mins

## 2019-12-27 NOTE — PROGRESS NOTES
1 cm opening in inferior aspect of mid sternal incision cleaned. Small amount of faint yellow slough present, sharp debridement performed with scissors. Approx 1.5 cm deep. Packing strips applied, area dressed. No signs/symptoms of infection. Pt tolerated it well. Additionally cleaned and removed silk stitch in left groin.

## 2019-12-27 NOTE — NURSE NAVIGATOR
HF NN took CARDIOMEMS reading using HEU. PAd 12. Myles Chandra NP notified via [facility] text.     Kaye Gallagher RN notified HF NN patient had 50 cc urine output via urinal.

## 2019-12-27 NOTE — DIABETES MGMT
[facility]    [facility] Cardiac Surgery Note  Follow Up    Recommendations/ Comments: POD 32 LVAD  BG's remain < 140 mg/dL past 48 hours   Pt is s/p LVAD placement 11/18/2019. Had brachial thrombectomy 11/26/19. Pt returned to CVICU 12/4/2019 due to respiratory distress. PO intake varies - 25 - 50%  Please add carb consistent to current diet order    Observe po intake and BG response    Current hospital DM medication: lispro correction scale, normal sensitivity    Chart reviewed and initial evaluation complete on Sona Kiser. Patient is a 71 y.o. male with no prior hx. Recent A1c suggestive of new dx. DTC saw pt for education regarding new diagnosis on 11/27/2019. May need review closer to discharge    A1c:   Lab Results   Component Value Date/Time    Hemoglobin A1c 6.6 (H) 10/25/2019 02:56 PM                  Recent Glucose Results:   Lab Results   Component Value Date/Time    GLU 98 12/27/2019 04:10 AM    GLUCPOC 129 (H) 12/27/2019 11:03 AM    GLUCPOC 111 (H) 12/27/2019 06:52 AM    GLUCPOC 126 (H) 12/26/2019 09:24 PM        Lab Results   Component Value Date/Time    Creatinine 1.40 (H) 12/27/2019 04:10 AM     Estimated Creatinine Clearance: 52.8 mL/min (A) (based on SCr of 1.4 mg/dL (H)). Active Orders   Diet    DIET REGULAR        PO intake:   Patient Vitals for the past 72 hrs:   % Diet Eaten   12/26/19 1200 30 %   12/26/19 0911 25 %   12/25/19 1858 50 %   12/25/19 1501 50 %   12/25/19 0810 50 %   12/24/19 1131 25 %     Will continue to follow as needed. Thank you.   Donnie Mcgee RN, [address]

## 2019-12-27 NOTE — PROGRESS NOTES
Problem: Mobility Impaired (Adult and Pediatric)  Goal: *Acute Goals and Plan of Care (Insert Text)  Description  FUNCTIONAL STATUS PRIOR TO ADMISSION: Patient was modified independent using a single point cane for functional mobility. Patient reports an increasingly sedentary lifestyle 2* fatigue and SOB/dyspnea. Retired (so is his wife). Patient reports x 3 falls within the last couple of weeks. Patient is wearing either nasal cannula or CPAP at night. LVAD work-up has been initiated. HOME SUPPORT PRIOR TO ADMISSION: The patient lived with his wife, but did not require physical assistance. Physical Therapy Goals  Reassessed on 12/26/2019, goals not met but remain appropriate, so carry over          Physical Therapy Goals:    Weekly reassessment completed 12/19/2019 and goals downgraded as appropriate. 1.  Patient will move from supine to sit and sit to supine, scoot up and down and roll side to side in bed with contact guard assistance within 7 days. 2.  Patient will perform sit to/from stand with minimal assistance x 1 within 7 days. 3.  Patient will ambulate 25 feet with least restrictive assistive device and moderate assistance within 7 days. 4.  Stair goal to be formulated when appropriate. 5.  Patient will perform cardiac exercises per protocol with supervision within 7 days. 6.  Patient will verbally and functionally recall mindful movement principles (Move in the Tube) with minimal verbal cuing/reminding within 7 days. 7.  Patient will perform power exchange for power module to/from battery with moderate assistance within 7 days. Re-evaluation completed 11/20/2019 and new goals formulated following LVAD implantation. Reviewed and cont 12/3/2019. Reviewed and continued 12/12/2019  1. Patient will move from supine to sit and sit to supine, scoot up and down and roll side to side in bed with minimal assistance/contact guard assist within 7 days.     2.  Patient will perform sit to/from stand with minimal assistance/contact guard assist within 7 days. 3.  Patient will ambulate 150 feet with least restrictive assistive device and minimal assistance/contact guard assist within 7 days. 4.  Patient will ascend/descend 4 stairs with handrail(s) with minimal assistance/contact guard assist within 7 days. 5.  Patient will perform cardiac exercises per protocol with supervision within 7 days. 6.  Patient will verbally and functionally recall 3/3 sternal precautions within 7 days. 7.  Patient will perform power exchange for power module to/from battery with moderate assistance  within 7 days. Initiated 10/27/2019; updated 11/15/2019   1. Patient will move from supine to sit and sit to supine, scoot up and down and roll side to side in bed with independence within 7 days. 2.  Patient will perform sit to/from stand with supervision/set-up within 7 days. 3.  Patient will ambulate 200 feet with least restrictive assistive device and supervision/set-up within 7 days. 4.  Patient will ascend/descend 4 stairs with  handrail(s) with supervision/set-up within 7 days for functional strengthening and community reintegration. .  5.  Patient will verbally and functionally recall 3/3 sternal precautions within 7 days in preparation for LVAD implantation. 6.  Patient will perform a mock power exchange for power module to/from battery with supervision/set-up within 7 days in preparation for LVAD implantation. 1.  Patient will move from supine to sit and sit to supine, scoot up and down and roll side to side in bed with independence within 7 days. 2.  Patient will perform sit to/from stand with supervision/set-up within 7 days. 3.  Patient will ambulate 150 feet with least restrictive assistive device and supervision/set-up within 7 days.    4.  Patient will ascend/descend 4 stairs with  handrail(s) with supervision/set-up within 7 days for functional strengthening and community reintegration. .  5.  Patient will verbally and functionally recall 3/3 sternal precautions within 7 days in preparation for LVAD implantation. 6.  Patient will perform a mock power exchange for power module to/from battery with supervision/set-up within 7 days in preparation for LVAD implantation. Outcome: Progressing Towards Goal    PHYSICAL THERAPY TREATMENT  Patient: Arianna Wynne (64 y.o. male)  Date: 12/27/2019  Diagnosis: Acute decompensated heart failure ([facility] [address] [I50.9]   <principal problem not specified>  Procedure(s) (LRB):  LEFT BRACHIAL THROMBECTOMY (Left) 32 Days Post-Op  Precautions: Fall, Sternal(LVAD )  Chart, physical therapy assessment, plan of care and goals were reviewed. ASSESSMENT  Patient continues with skilled PT services and is progressing towards goals. Pt is eager to mobilize. Pt was able to ambulate a short distance but with decrease LE stability. Pt fatigued with task. Pt was returned supine and fell asleep quickly. Current Level of Function Impacting Discharge (mobility/balance): mod to min A x2     Other factors to consider for discharge: decrease mobility, LVAD, increase assistance for mobility          PLAN :  Patient continues to benefit from skilled intervention to address the above impairments. Continue treatment per established plan of care. to address goals. Recommendation for discharge: (in order for the patient to meet his/her long term goals)  Therapy 3 hours per day 5-7 days per week    This discharge recommendation:  Has not yet been discussed the attending provider and/or case management    IF patient discharges home will need the following DME: to be determined (TBD)       SUBJECTIVE:   Patient stated Are we going to walk.     OBJECTIVE DATA SUMMARY:   Critical Behavior:  Neurologic State: Alert  Orientation Level: Oriented X4  Cognition: Appropriate for age attention/concentration  Safety/Judgement: Awareness of environment  Functional Mobility Training:  Bed Mobility:        Sit to Supine: Contact guard assistance           Transfers:  Sit to Stand: Moderate assistance;Assist x2  Stand to Sit: Minimum assistance;Assist x2        Bed to Chair: Minimum assistance;Assist x2                    Balance:  Standing: Impaired  Standing - Static: Fair  Standing - Dynamic : Poor  Ambulation/Gait Training:  Distance (ft): 8 Feet (ft)  Assistive Device: Gait belt(HHAx2)  Ambulation - Level of Assistance: Moderate assistance;Assist x2        Gait Abnormalities: Ataxic;Decreased step clearance        Base of Support: Center of gravity altered                             Stairs: Therapeutic Exercises:     Pain Rating:  No complaints     Activity Tolerance:   Limited   Please refer to the flowsheet for vital signs taken during this treatment.     After treatment patient left in no apparent distress:   Supine in bed and Call bell within reach    COMMUNICATION/COLLABORATION:   The patients plan of care was discussed with: Registered Nurse    Viv Pinedo PTA   Time Calculation: 28 mins

## 2019-12-27 NOTE — PROGRESS NOTES
Problem: Dysphagia (Adult)  Goal: *Acute Goals and Plan of Care (Insert Text)  Description  Speech Pathology  Re-evaluation 12/18/2019   1. Patient will verbalize understanding of aspiration risk, dysphagia and diet recommendation within 7 days    Re-Evaluation 12/11/19  1. Patient will tolerate pureed diet, nectar thickened liquids without overt s/s aspiration within 7 days. MET    Re-evaluation 12/3/2019  1. Patient will tolerate least restrictive diet without adverse effects within 7 days. Discontinue    Re-evaluation 11/26/2019   1. Patient will tolerate regular/thin liquid diet with no overt s/s aspiration within 7 days. Discontinue    Re-evaluation 11/20/2019   1. Patient will tolerate mechanical soft/thin liquid diet with no overt s/s aspiration within 7 days. MET    Initiated 10/28/19  1. Patient will tolerate regular diet/thin liquids without overt s/s of aspiration within 7 days MET  2. Patient will participate in [address] for further objective assessment of swallow physiology within 7 days (once medically stable from Impella/cardiac sx standpoint) NOT MET; discontinue        Outcome: Resolved/Met     SPEECH PATHOLOGY MODIFIED BARIUM SWALLOW STUDY/DISCHARGE  Patient: Anabel Herrera (75 y.o. male)  Date: 12/27/2019  Primary Diagnosis: Acute decompensated heart failure ([facility] [address] [I50.9]  Procedure(s) (LRB):  LEFT BRACHIAL THROMBECTOMY (Left) 32 Days Post-Op   Precautions:   Fall, Sternal(LVAD )    ASSESSMENT :  Based on the objective data described below, the patient presents with functional oropharyngeal phases of swallow. Patient with no aspiration, and only trace penetration that is cleared with the force of the swallow with larger, sequential sips of thin liquids. No oral nor pharyngeal residue noted. Therefore, patient safe to initiate thin liquids. No further acute SLP needs anticipated. Will sign-off. Please reconsult should SLP be of any assistance.       PLAN :  Recommendations and Planned Interventions:  -Regular / Thin liquid diet.   -Upright 90 degrees, Single sips, and Small bites. Discharge Recommendations: None     SUBJECTIVE:   Patient stated, \"I can have regular coffee! . OBJECTIVE:     Past Medical History:   Diagnosis Date    Degenerative disc disease, lumbar     Heart failure ([facility] [address]     High cholesterol     Hypertension     Paroxysmal atrial fibrillation ([facility] [address] 4/2/2019    Spinal stenosis      Past Surgical History:   Procedure Laterality Date    COLONOSCOPY Left 11/11/2019    COLONOSCOPY at bedside performed by Tonie Du MD at [facility] ENDOSCOPY    [address] GRAFT      triple    HX HERNIA REPAIR      HX IMPLANTABLE CARDIOVERTER DEFIBRILLATOR      [place] CARDIOVERSION ELECTIVE ARRHYTHMIA EXTERNAL N/A 6/10/2019    EP CARDIOVERSION performed by Jhonatan Cartwright MD at [place] [address], [facility] CATH LAB    [place] CARDIOVERSION ELECTIVE ARRHYTHMIA EXTERNAL N/A 6/18/2019    EP CARDIOVERSION performed by Derwin Schwab, MD at [place] [address], [facility] [address] CATH LAB    [place] INSJ/RPLCMT PERM DFB W/TRNSVNS LDS 1/DUAL CHMBR N/A 6/21/2019    INSERT ICD BIV MULTI performed by Meena Briggs MD at [place] [address], [address] CATH LAB    [place] TCAT IMPL WRLS P-ART PRS SNR L-T HEMODYN MNTR N/A 9/18/2019    IMPLANT HEART FAILURE MONITORING DEVICE performed by Alex Martin MD at [place] [address], [facility] CATH LAB     Prior Level of Function/Home Situation:   Home Situation  Home Environment: Private residence  # Steps to Enter: 0  One/Two Story Residence: One story  Living Alone: No  Support Systems: Spouse/Significant Other/Partner  Patient Expects to be Discharged to[de-identified] Unknown  Current DME Used/Available at Home: Cane, straight, Walker, rolling, CPAP  Tub or Shower Type: Shower  Diet prior to admission: Regular diet/thin liquids  Current Diet:  Regular diet/nectar-thick liquids  Radiologist: Dr. Rushing Creamer: Lateral;Fluoro;AP  Patient Position: upright in hausted    Trial 1:   Consistency Presented:  Thin liquid   How Presented: Self-fed/presented;Straw;Cup/sip; Successive swallows   Consistency Amount: (250 cc thin Ba)   Bolus Acceptance: No impairment   Bolus Formation/Control: No impairment:     Propulsion: No impairment   Oral Residue: None   Initiation of Swallow: Triggered at vallecula;Triggered at pyriform sinus(es)   Timing: No impairment   Penetration: Flash/transient;During swallow   Aspiration/Timing: No evidence of aspiration   Pharyngeal Clearance: No residue                       Decreased Tongue Base Retraction?: No  Laryngeal Elevation: WFL (within functional limits)  Aspiration/Penetration Score: 2 (Penetration/No residue-Contrast enters the airway penetrates, remains above the folds/cords, and is cleared)  Pharyngeal Symmetry: Symmetrical  Pharyngeal-Esophageal Segment: No impairment  Pharyngeal Dysfunction: None    Oral Phase Severity: No impairment  Pharyngeal Phase Severity: N/A    Dysphagia Treatment:  Patient seen immediately following study to discuss results. Talked about differences between previous MBS and current MBS results and the reasoning. Discussed general aspiration precautions. Patient expressed understanding and asked appropriate questions. NOMS:   The NOMS functional outcome measure was used to quantify this patient's level of swallowing impairment. Based on the NOMS, the patient was determined to be at level 7 for swallow function       NOMS Swallowing Levels:  Level 1 (CN): NPO  Level 2 (CM): NPO but takes consistency in therapy  Level 3 (CL): Takes less than 50% of nutrition p.o. and continues with nonoral feedings; and/or safe with mod cues; and/or max diet restriction  Level 4 (CK):  Safe swallow but needs mod cues; and/or mod diet restriction; and/or still requires some nonoral feeding/supplements  Level 5 (CJ): Safe swallow with min diet restriction; and/or needs min cues  Level 6 (CI): Independent with p.o.; rare cues; usually self cues; may need to avoid some foods or needs extra time  Level 7 ([facility]): Independent for all p.o.  ECHO. (2003). National Outcomes Measurement System (NOMS): Adult Speech-Language Pathology User's Guide. COMMUNICATION/EDUCATION:     The patient's plan of care including recommendations, planned interventions, and recommended diet changes were discussed with: Registered Nurse. Patient/family have participated as able in goal setting and plan of care.     Thank you for this referral.  You Flores SLP  Time Calculation: 25 mins

## 2019-12-27 NOTE — PROGRESS NOTES
0730 Bedside shift change report given to Elvira Moe, RN and Alida Diamond, RN (oncoming nurse) by Anabella Grant RN  (offgoing nurse). Report included the following information SBAR, Kardex, Intake/Output, MAR and Recent Results. 1000 Pt off unit with RN and monitor for barium swallow. 1059 Pt back on unit and tele confirmed with monitor tech. [address] [facility], [address] Narayan Hernandez at bedside to assess opened inferior portion of sternal wound.

## 2019-12-27 NOTE — PROGRESS NOTES
SLP Contact Note Modified Barium Swallow Study scheduled tentatively for this morning. Diet recommendations to follow. Thank you, Damian Vazquez M.Ed, CCC-SLP Speech-Language Pathologist

## 2019-12-28 NOTE — PROGRESS NOTES
[facility]   [address] [facility], [address] [place] [address], [facility]  Phone: [phone]   [phone]       Nephrology Progress Note  [address]     1950     [id number]  Date of Admission : 10/25/2019  12/28/19    CC:  Follow up for JUAN J, CKD, Hyponatremia      Assessment and Plan   JUAN J on CKD:  - stable  -Reduce Bumex to daily  - Milrinone being weaned     Hyponatremia :  -Secondary to CHF  -Fluid restriction 1200 cc/day    Gross hematuria, BPH w/ retention:  - off CBI pre VAD. 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   CT in place    L arm clot s/p thrombectomy on 11/25    JOSE on CPAP      PAF s/p DCCV 6/19     Anemia:  - from blood loss s/p multiple blood products  - s/p IV iron,  Repeat iron studies ok  -  IV iron and increased Epogen ordered 12/25         Interval History:    Patient reports dysuria with voiding  Otherwise no new symptoms  Denies any nausea vomiting chest pain or shortness of breath      Review of Systems: as per HPI    Current Medications:   Current Facility-Administered Medications   Medication Dose Route Frequency    warfarin (COUMADIN) tablet 2 mg  2 mg Oral ONCE    bumetanide (BUMEX) injection 1 mg  1 mg IntraVENous ONCE    magnesium oxide (MAG-OX) tablet 800 mg  800 mg Oral BID    phenazopyridine (PYRIDIUM) tablet 100 mg  100 mg Oral BID PRN    albumin human 5% (BUMINATE) solution 12.5 g  12.5 g IntraVENous DAILY    milrinone (PRIMACOR) 20 MG/100 ML D5W infusion  0.1 mcg/kg/min IntraVENous CONTINUOUS    albumin human 5% (BUMINATE) solution 12.5 g  12.5 g IntraVENous DIALYSIS PRN    lidocaine (URO-JET/ GLYDO) 2 % jelly   Urethral PRN    senna-docusate (PERICOLACE) 8.6-50 mg per tablet 1 Tab  1 Tab Oral DAILY    epoetin yvonne-epbx (RETACRIT) injection 20,000 Units  20,000 Units SubCUTAneous Q MON, WED & FRI    levETIRAcetam (KEPPRA) tablet 125 mg  125 mg Oral BID    dronabinol (MARINOL) capsule 2.5 mg  2.5 mg Oral DAILY    polyethylene glycol (MIRALAX) packet 17 g  17 g Oral DAILY    albuterol-ipratropium (DUO-NEB) 2.5 MG-0.5 MG/3 ML  3 mL Nebulization Q6H PRN    therapeutic multivitamin (THERAGRAN) tablet 1 Tab  1 Tab Oral DAILY    escitalopram oxalate (LEXAPRO) tablet 10 mg  10 mg Oral QPM    potassium chloride SR (KLOR-CON 10) tablet 40 mEq  40 mEq Oral DAILY    alteplase (CATHFLO) 1 mg in sterile water (preservative free) 1 mL injection  1 mg InterCATHeter PRN    finasteride (PROSCAR) tablet 5 mg  5 mg Oral DAILY    spironolactone (ALDACTONE) tablet 25 mg  25 mg Oral DAILY    clonazePAM (KlonoPIN) tablet 0.5 mg  0.5 mg Oral TID PRN    sildenafil (pulm.hypertension) (REVATIO) tablet 40 mg  40 mg Oral TID    diphenhydrAMINE (BENADRYL) capsule 25 mg  25 mg Oral QHS PRN    octreotide (SANDOSTATIN) injection 25 mcg  25 mcg SubCUTAneous TID    benzocaine-menthol (CEPACOL) lozenge 1 Lozenge  1 Lozenge Mucous Membrane PRN    digoxin (LANOXIN) tablet 0.0625 mg  62.5 mcg Oral Q MON, WED & FRI    pantoprazole (PROTONIX) tablet 40 mg  40 mg Oral ACB    allopurinol (ZYLOPRIM) tablet 100 mg  100 mg Oral DAILY    arformoterol (BROVANA) neb solution 15 mcg  15 mcg Nebulization BID RT    And    budesonide (PULMICORT) 500 mcg/2 ml nebulizer suspension  500 mcg Nebulization BID RT    artificial saliva (MOUTH KOTE) 1 Spray  1 Spray Oral PRN    lactobac ac& pc-s.therm-b.anim (TIAN Q/RISAQUAD)  1 Cap Oral DAILY    oxyCODONE IR (ROXICODONE) tablet 5 mg  5 mg Oral Q6H PRN    balsam peru-castor oil (VENELEX) ointment   Topical TID    tamsulosin (FLOMAX) capsule 0.4 mg  0.4 mg Oral DAILY    insulin lispro (HUMALOG) injection   SubCUTAneous AC&HS    Warfarin MD/NP dosing   Other PRN    sodium chloride (NS) flush 5-40 mL  5-40 mL IntraVENous Q8H    sodium chloride (NS) flush 5-40 mL  5-40 mL IntraVENous PRN    acetaminophen (TYLENOL) tablet 650 mg  650 mg Oral Q6H PRN    naloxone (NARCAN) injection 0.4 mg  0.4 mg IntraVENous PRN    ondansetron (ZOFRAN) injection 4 mg  4 mg IntraVENous Q4H PRN    bisacodyl (DULCOLAX) tablet 5 mg  5 mg Oral DAILY PRN    sucralfate (CARAFATE) tablet 1 g  1 g Oral AC&HS    influenza vaccine 2019-20 (6 mos+)(PF) (FLUARIX/FLULAVAL/FLUZONE QUAD) injection 0.5 mL  0.5 mL IntraMUSCular PRIOR TO DISCHARGE    hydrALAZINE (APRESOLINE) 20 mg/mL injection 20 mg  20 mg IntraVENous Q6H PRN    dextrose 10 % infusion 125-250 mL  125-250 mL IntraVENous PRN      No Known Allergies    Objective:  Vitals:    Vitals:    12/28/19 0002 12/28/19 0452 12/28/19 0540 12/28/19 0852   BP:       Pulse: 77 78  77   Resp: 18 18  18   Temp: 98.2 °F (36.8 °C) 98 °F (36.7 °C)  98.2 °F (36.8 °C)   SpO2: 95% 97%  100%   Weight:   78.3 kg (172 lb 9.9 oz)    Height:         Intake and Output:  No intake/output data recorded. 12/26 1901 - 12/28 0700  In: 1663.2 [P.O.:1560; I.V.:103.2]  Out: 3774 [Urine:3390]    Physical Examination:    General: Elderly man in no acute distress  Neck:  No lines   Resp:  TA  CV:  VAD sounds; No LE edema  GI:  Soft and non-tender; no distension  Neurologic:  Alert and appropriate; normal speech  :  No Lizama    [x]    High complexity decision making was performed  [x]    Patient is at high-risk of decompensation with multiple organ involvement    Lab Data Personally Reviewed: I have reviewed all the pertinent labs, microbiology data and radiology studies during assessment.     Recent Labs     12/28/19  0501 12/27/19  0410 12/26/19  0248   * 134* 132*   K 4.5 4.4 4.2   CL 96* 98 95*   CO2 29 30 32   GLU 99 98 93   BUN 20 22* 27*   CREA 1.28 1.40* 1.52*   CA 8.6 8.5 8.8   MG 1.7 1.9 2.0   ALB 2.4* 2.4* 2.4*   SGOT 28 24 25   ALT 18 18 19   INR 2.2* 1.9* 1.7*     Recent Labs     12/28/19  0501 12/27/19  0410 12/26/19  1609 12/26/19  0248 12/25/19  1238   WBC 6.2 6.9  --  4.2  --    HGB 8.5* 8.7* 8.8* 8.2* 8.9*   HCT 27.5* 27.7* 28.2* 26.4* 28.5*    172  --  166  --      No results found for: SDES  Lab Results   Component Value Date/Time    Culture result: NO GROWTH 2 DAYS 12/26/2019 10:23 AM    Culture result: NO GROWTH 5 DAYS 12/15/2019 03:37 PM    Culture result: NO GROWTH 1 DAY 12/15/2019 03:30 PM    Culture result: NO GROWTH 5 DAYS 12/06/2019 11:23 PM    Culture result: HEAVY ENTEROCOCCUS SPECIES NOTED (A) 11/27/2019 11:10 AM    Culture result: LIGHT YEAST (A) 11/27/2019 11:10 AM     Recent Results (from the past 24 hour(s))   GLUCOSE, POC    Collection Time: 12/27/19 11:03 AM   Result Value Ref Range    Glucose (POC) 129 (H) 65 - 100 mg/dL    Performed by Alexa MONTOYA    GLUCOSE, POC    Collection Time: 12/27/19  5:02 PM   Result Value Ref Range    Glucose (POC) 114 (H) 65 - 100 mg/dL    Performed by Maeve Orozco, POC    Collection Time: 12/27/19  9:14 PM   Result Value Ref Range    Glucose (POC) 157 (H) 65 - 100 mg/dL    Performed by Silver, COMPREHENSIVE    Collection Time: 12/28/19  5:01 AM   Result Value Ref Range    Sodium 128 (L) 136 - 145 mmol/L    Potassium 4.5 3.5 - 5.1 mmol/L    Chloride 96 (L) 97 - 108 mmol/L    CO2 29 21 - 32 mmol/L    Anion gap 3 (L) 5 - 15 mmol/L    Glucose 99 65 - 100 mg/dL    BUN 20 6 - 20 MG/DL    Creatinine 1.28 0.70 - 1.30 MG/DL    BUN/Creatinine ratio 16 12 - 20      GFR est AA >60 >60 ml/min/1.73m2    GFR est non-AA 56 (L) >60 ml/min/1.73m2    Calcium 8.6 8.5 - 10.1 MG/DL    Bilirubin, total 0.6 0.2 - 1.0 MG/DL    ALT (SGPT) 18 12 - 78 U/L    AST (SGOT) 28 15 - 37 U/L    Alk.  phosphatase 114 45 - 117 U/L    Protein, total 6.5 6.4 - 8.2 g/dL    Albumin 2.4 (L) 3.5 - 5.0 g/dL    Globulin 4.1 (H) 2.0 - 4.0 g/dL    A-G Ratio 0.6 (L) 1.1 - 2.2     MAGNESIUM    Collection Time: 12/28/19  5:01 AM   Result Value Ref Range    Magnesium 1.7 1.6 - 2.4 mg/dL   CBC W/O DIFF    Collection Time: 12/28/19  5:01 AM   Result Value Ref Range    WBC 6.2 4.1 - 11.1 K/uL    RBC 2.91 (L) 4.10 - 5.70 M/uL    HGB 8.5 (L) 12.1 - 17.0 g/dL    HCT 27.5 (L) 36.6 - 50.3 %    MCV 94.5 80.0 - 99.0 FL    MCH 29.2 26.0 - 34.0 PG    MCHC 30.9 30.0 - 36.5 g/dL    RDW 17.4 (H) 11.5 - 14.5 %    PLATELET 717 324 - 321 K/uL    MPV 9.3 8.9 - 12.9 FL    NRBC 0.0 0  WBC    ABSOLUTE NRBC 0.00 0.00 - 0.01 K/uL   PROTHROMBIN TIME + INR    Collection Time: 12/28/19  5:01 AM   Result Value Ref Range    INR 2.2 (H) 0.9 - 1.1      Prothrombin time 21.4 (H) 9.0 - 11.1 sec   PTT    Collection Time: 12/28/19  5:01 AM   Result Value Ref Range    aPTT 43.4 (H) 22.1 - 32.0 sec    aPTT, therapeutic range     58.0 - 77.0 SECS   DIGOXIN    Collection Time: 12/28/19  5:01 AM   Result Value Ref Range    Digoxin level 0.9 0.90 - 2.00 NG/ML   LACTIC ACID    Collection Time: 12/28/19  5:01 AM   Result Value Ref Range    Lactic acid 0.7 0.4 - 2.0 MMOL/L   PROCALCITONIN    Collection Time: 12/28/19  5:01 AM   Result Value Ref Range    Procalcitonin 0.06 ng/mL   LD    Collection Time: 12/28/19  5:01 AM   Result Value Ref Range     (H) 85 - 241 U/L   NT-PRO BNP    Collection Time: 12/28/19  5:01 AM   Result Value Ref Range    NT pro-BNP 3,804 (H) <125 PG/ML   GLUCOSE, POC    Collection Time: 12/28/19  6:48 AM   Result Value Ref Range    Glucose (POC) 106 (H) 65 - 100 mg/dL    Performed by Armando Oliver MD

## 2019-12-28 NOTE — PROGRESS NOTES
1930: Bedside shift change report given to [address] [place] [address] (oncoming nurse) by Elizabeth Juarez RN (offgoing nurse). Report included the following information SBAR, Kardex, Procedure Summary, Intake/Output, MAR and Recent Results.

## 2019-12-28 NOTE — PROGRESS NOTES
[facility] in [place], [place]  Inpatient Progress Note      Patient name: Ciro Bradford  Patient : 1950  Patient MRN: [id number]  Attending MD: Catalina Chamorro MD  Date of service: 19    Chief Complaint:   Breanne Ever azucena LVAD implant     HPI: Sona Kiser is a 71y.o. year old pleasant white male with a history of HTN, HLD, JOSE, CAD s/p cardiac arrest VFib s/p CABG () c/b sternal would infection and sternectomy, ischemic cardiomyopathy LVEF 15-20%, s/p ICD and with LBBB. He was admitted with acute on chronic systolic heart failure with massive volume overload > 20 lbs, in the setting of atrial fibrillation s/p failed DCCV and underwent DCCV and RHC on .  S/p BiVICD on 19 with Dr. Gerhard Mckinney. He was discharged home home on IV milrinone on 19. Jeremiah Shepard has been followed closely by Dr. Philip Lma and the [facility].     Mr. Kiser was admitted for acute on chronic systolic heart failure. He underwent implantation of Impella 5.0 due to CS and has completed his LVAD evaluation. Jeremiah Shepard meets criteria for implant of HM3 as DT. He was NYHA Class IV prior to implant of Impella 5.0, has LVEF < 15%, was intolerant of GDMT due to symptomatic hypotension and renal dysfunction. He remains dependent on temporary MCS support. RHC  revealed compensated hemodynamics on Impella. His renal function has improved dramatically with improvement in his hemodynamics. He is not a suitable transplant candidate due to single kidney, sternectomy, debility, and frailty. He was reviewed by Bertin Jansen and felt to be a high risk heart transplant candidate due to multiple co-morbidities as well as the afore mentioned conditions.  He remained in the CCU and underwent a HM 3 implant as DT on .   He was weaned off of pressors and transferred to [place] [address] where he continues to undergo PT/OT and hemodynamic optimization.       24Hr Events  Slept well last night  Hgb stable  Hematuria improved  Bladder spasms    Procedure:  Procedure(s):  REMOVAL TEMPORARY LEFT VENTRICULAR ASSIST DEVICE, IMPLANTATION OF LEFT VENTRICULAR ASSIST DEVICE PERMANENT (VAD), ECC, JACQUE, EPI AORTIC US BY DR Milvia Cervantes.     POD:  39     Impression / Plan:   Heart Failure Status: NYHA Class IV    Chronic systolic heart failure due to ICM, NYHA Class IV, EF < 15%, cardiogenic shock bridged with Impella 5.0 support to HM 3 implant   S/p Impella removal and LVAD implant 11/18/19  RPMs 6400 rpm - frequent PI events  TTE with bubble study negative for PFO  Decrease milrinone to 0.1mcg/kg/min  obtain CardioMEMS readings  Decrease bumex to 1mg daily    Cont Sildenafil 40 mg PO TID - rx sent to local pharmacy to initiate PA   Intolerant of BB due to RV failure  Intolerant of ACEi/ARB/ARNI/AA due to JUAN J on CKD 3  Strict I/O, daily weights, Na+ restricted diet   Continue LVAD education   dressing change frequency three times weekly, Sutures removed 12/26/19  Delayed skin healing on bottom portion of sternotomy - will have CSS PA evaluate  TTE 12/16- EF 15-20%    Generalized myoclonus   Improved   Appreciate Neurology input  Decreased Keppra due to somnolence - 125 mg po BID   Head CT negative for acute process  EEG suggestive of mild generalized encephalopathic process, possibly related to underlying structural brain injury vs metabolic abnormality  Monitor closely      Anticoagulation for LVAD, INR goal 1.5-2  Goal decreased d/t persistent hematuria   No ASA d/t hematuria  INR 2.2  Coumadin - 2 mg tonight    Epistaxis  Resolved   Afrin soaked guaze  ENT consult appreciated  Monitor for now      Acute Respiratory Failure post op  Improved with aggressive diuresis  Off supplemental O2  Pulmonary hygiene   Cont home CPAP- must use while sleeping   Schedule PET CT prior to discharge    Acute on CKD 3, atrophic left kidney  Appreciate Nephrology assistance  Watch Cr - improving   Decrease Bumex to 1mg daily  Albumin today    Avoid nephrotoxins, trend labs   Renally dose meds      CAD s/p CABG   Off ASA d/t hematuria  No BB d/t RV failure  Hold statin due to recent hepatic failure   LHC performed 11/13 - low likelihood of benefit from revascularization      Hx of VF arrest s/p BiV-ICD  No recent shocks  Keep K > 4 and Mg > 2   Increase Mag ox to 800mg po BID    PAF   Dig level 0.9 -cont dig (62.5 mcg qMWF)  No BB due to RV failure   Repeat TFTs WNL     Hx of gout  Uric acid WNL    Acute blood loss anemia  Likely due to hematuria  Cont octreotide 25 mcg SQ TID   Fecal occult 12/14 negative, positive on 12/17  Appreciate urology recommendations  Hematuria improved  Monitor for now  Will d/w Urology about bladder spasms      Suspected aspiration pneumonia  Sputum culture showing scant growth of yeast  Cefepime complete    Urinary retention, c/b serratia UTI and hematuria  Appreciate Urology consult   Repeat UA with cx negative 12/15   Watch off abx  Cystoscopy performed 11/13 shows catheter induced cystitis   Pyridium 100mg BID prn for bladder spasms     Sepsis Alert  Paired Blood Neg  Repeat paired cultures- pending   Urine cx negative   Sputum cx when able   ESR elevated to 61    Malnutrition   Appreciate Nutritionist consult  Prealbumin weekly - 20.1 on 12/23   Diet as tolerated  Intolerant of mirtazapine d/t tremors, confusion   Decreased Marinol to 2.5 mg PO qPM d/t lethargy  Cont MVI     COPD   Appreciate Pulmonology assistance   Continue nebs PRN       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  No additional treatment at this time     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 Not ready for discharge at this time        LVAD INTERROGATION:  Device interrogated in person  Device function normal, normal flow, multiple PI events    LVAD   Pump Speed (RPM): 6400  Pump Flow (LPM): 6  MAP: 75  PI (Pulsitility Index): 3.4  Power: 5.6   Test: Yes  Back Up  at Bedside & Labeled: Yes  Power Module Test: Yes  Driveline Site Care: No  Driveline Dressing: Clean, Dry, and Intact  Outpatient: No  MAP in Therapeutic Range (Outpatient): Yes  Testing  Alarms Reviewed: Yes  Back up SC speed: 6400  Back up Low Speed Limit: 6000  Emergency Equipment with Patient?: Yes  Emergency procedures reviewed?: Yes  Drive line site inspected?: No  Drive line intergrity inspected?: Yes  Drive line dressing changed?: No    PHYSICAL EXAM:  Visit Vitals  BP 91/72 (BP 1 Location: Left arm, BP Patient Position: At rest)   Pulse 77   Temp 98.2 °F (36.8 °C)   Resp 18   Ht 6' 2\" (1.88 m)   Wt 172 lb 9.9 oz (78.3 kg)   SpO2 100%   BMI 22.16 kg/m²       Physical Exam   Constitutional: He is oriented to person, place, and time. He appears well-developed. He appears cachectic. No distress. HENT:   Head: Normocephalic. Neck: Normal range of motion. Neck supple. No JVD present. Cardiovascular: Normal rate, regular rhythm and normal heart sounds. + VAD hum    Pulmonary/Chest: Effort normal and breath sounds normal. No respiratory distress. Abdominal: Soft. Bowel sounds are normal. He exhibits no distension. Musculoskeletal: Normal range of motion. General: No edema. Neurological: He is alert and oriented to person, place, and time. Skin: Skin is warm and dry. Nursing note and vitals reviewed. REVIEW OF SYSTEMS:  Review of Systems   Constitutional: Positive for malaise/fatigue. Negative for chills and fever.    HENT: Positive for hearing loss. Negative for nosebleeds. Respiratory: Negative for cough and shortness of breath. Mild dyspnea   Cardiovascular: Negative for chest pain, palpitations, orthopnea and leg swelling. Gastrointestinal: Negative for heartburn, nausea and vomiting. Genitourinary: Negative for hematuria. Bladder spasms    Musculoskeletal: Negative. Neurological: Positive for weakness. Negative for dizziness and headaches. Endo/Heme/Allergies: Does not bruise/bleed easily.        PAST MEDICAL HISTORY:  Past Medical History:   Diagnosis Date    Degenerative disc disease, lumbar     Heart failure ([facility] [address]     High cholesterol     Hypertension     Paroxysmal atrial fibrillation ([facility] [address] 4/2/2019    Spinal stenosis        PAST SURGICAL HISTORY:  Past Surgical History:   Procedure Laterality Date    COLONOSCOPY Left 11/11/2019    COLONOSCOPY at bedside [facility] ENDOSCOPY    HX APPENDECTOMY      HX CORONARY ARTERY BYPASS GRAFT      triple    HX HERNIA REPAIR      HX IMPLANTABLE CARDIOVERTER DEFIBRILLATOR      DC CARDIOVERSION ELECTIVE ARRHYTHMIA EXTERNAL N/A 6/10/2019    EP CARDIOVERSION performed by Liz Keene MD at [place] [address], [address] CATH LAB    DC CARDIOVERSION ELECTIVE ARRHYTHMIA EXTERNAL N/A 6/18/2019    EP CARDIOVERSION performed by John Wooten MD at [place] [address], [address] CATH LAB    DC INSJ/RPLCMT PERM DFB W/TRNSVNS LDS 1/DUAL [address] N/A 6/21/2019    INSERT ICD BIV MULTI performed by Sisi Harrell MD at [place] [address], [address] CATH LAB    DC TCAT IMPL WRLS P-ART PRS SNR L-T HEMODYN MNTR N/A 9/18/2019    IMPLANT HEART FAILURE MONITORING DEVICE performed by Fritz Meyer MD at [place] [address], [address] CATH LAB       FAMILY HISTORY:  Family History   Problem Relation Age of Onset    Lung Disease Mother     Hypertension Mother     Arthritis-osteo Mother     Heart Disease Mother     Heart Disease Father     Diabetes Father        SOCIAL HISTORY:  Social History     Socioeconomic History  Marital status:      Spouse name: Not on file    Number of children: Not on file    Years of education: Not on file    Highest education level: Not on file   Tobacco Use    Smoking status: Former Smoker     Last attempt to quit: 2010     Years since quittin.0    Smokeless tobacco: Never Used   Substance and Sexual Activity    Alcohol use: Not Currently     Comment: rarely    Drug use: Never   Other Topics Concern       LABORATORY RESULTS:     Labs Latest Ref Rng & Units 2019   WBC 4.1 - 11.1 K/uL 6.2 6.9 - 4.2 - 4.1 -   RBC 4.10 - 5.70 M/uL 2.91(L) 2.93(L) - 2.82(L) - 2.52(L) -   Hemoglobin 12.1 - 17.0 g/dL 8.5(L) 8.7(L) 8.8(L) 8.2(L) 8. 9(L) 7. 5(L) 7. 5(L)   Hematocrit 36.6 - 50.3 % 27. 5(L) 27. 7(L) 28. 2(L) 26. 4(L) 28. 5(L) 24. 0(L) 24. 8(L)   MCV 80.0 - 99.0 FL 94.5 94.5 - 93.6 - 95.2 -   Platelets 952 - 873 K/uL 163 172 - 166 - 179 -   Lymphocytes 12 - 49 % - - - - - - -   Monocytes 5 - 13 % - - - - - - -   Eosinophils 0 - 7 % - - - - - - -   Basophils 0 - 1 % - - - - - - -   Albumin 3.5 - 5.0 g/dL 2. 4(L) 2. 4(L) - 2. 4(L) - 2. 7(L) -   Calcium 8.5 - 10.1 MG/DL 8.6 8.5 - 8.8 - 8.8 -   SGOT 15 - 37 U/L 28 24 - 25 - 27 -   Glucose 65 - 100 mg/dL 99 98 - 93 - 110(H) -   BUN 6 - 20 MG/DL 20 22(H) - 27(H) - 33(H) -   Creatinine 0.70 - 1.30 MG/DL 1.28 1.40(H) - 1.52(H) - 1.75(H) -   Sodium 136 - 145 mmol/L 128(L) 134(L) - 132(L) - 131(L) -   Potassium 3.5 - 5.1 mmol/L 4.5 4.4 - 4.2 - 4.4 -   TSH 0.36 - 3.74 uIU/mL - - - - - - -   LDH 85 - 241 U/L 249(H) 237 - 208 - 193 -   CEA ng/mL - - - - - - -   Some recent data might be hidden     Lab Results   Component Value Date/Time    TSH 2.30 2019 03:29 AM    TSH 2.40 10/25/2019 07:39 PM    TSH 2.45 2019 04:16 AM       ALLERGY:  No Known Allergies     CURRENT MEDICATIONS:  Current Facility-Administered Medications   Medication Dose Route Frequency    warfarin (COUMADIN) tablet 2 mg  2 mg Oral ONCE    magnesium oxide (MAG-OX) tablet 800 mg  800 mg Oral BID    phenazopyridine (PYRIDIUM) tablet 100 mg  100 mg Oral BID PRN    albumin human 5% (BUMINATE) solution 12.5 g  12.5 g IntraVENous DAILY    milrinone (PRIMACOR) 20 MG/100 ML D5W infusion  0.1 mcg/kg/min IntraVENous CONTINUOUS    albumin human 5% (BUMINATE) solution 12.5 g  12.5 g IntraVENous DIALYSIS PRN    lidocaine (URO-JET/ GLYDO) 2 % jelly   Urethral PRN    senna-docusate (PERICOLACE) 8.6-50 mg per tablet 1 Tab  1 Tab Oral DAILY    epoetin yvonne-epbx (RETACRIT) injection 20,000 Units  20,000 Units SubCUTAneous Q MON, WED & FRI    levETIRAcetam (KEPPRA) tablet 125 mg  125 mg Oral BID    dronabinol (MARINOL) capsule 2.5 mg  2.5 mg Oral DAILY    polyethylene glycol (MIRALAX) packet 17 g  17 g Oral DAILY    albuterol-ipratropium (DUO-NEB) 2.5 MG-0.5 MG/3 ML  3 mL Nebulization Q6H PRN    therapeutic multivitamin (THERAGRAN) tablet 1 Tab  1 Tab Oral DAILY    escitalopram oxalate (LEXAPRO) tablet 10 mg  10 mg Oral QPM    potassium chloride SR (KLOR-CON 10) tablet 40 mEq  40 mEq Oral DAILY    alteplase (CATHFLO) 1 mg in sterile water (preservative free) 1 mL injection  1 mg InterCATHeter PRN    finasteride (PROSCAR) tablet 5 mg  5 mg Oral DAILY    spironolactone (ALDACTONE) tablet 25 mg  25 mg Oral DAILY    clonazePAM (KlonoPIN) tablet 0.5 mg  0.5 mg Oral TID PRN    sildenafil (pulm.hypertension) (REVATIO) tablet 40 mg  40 mg Oral TID    diphenhydrAMINE (BENADRYL) capsule 25 mg  25 mg Oral QHS PRN    octreotide (SANDOSTATIN) injection 25 mcg  25 mcg SubCUTAneous TID    benzocaine-menthol (CEPACOL) lozenge 1 Lozenge  1 Lozenge Mucous Membrane PRN    digoxin (LANOXIN) tablet 0.0625 mg  62.5 mcg Oral Q MON, WED & FRI    pantoprazole (PROTONIX) tablet 40 mg  40 mg Oral ACB    allopurinol (ZYLOPRIM) tablet 100 mg  100 mg Oral DAILY    arformoterol (BROVANA) neb solution 15 mcg  15 mcg Nebulization BID RT    And  budesonide (PULMICORT) 500 mcg/2 ml nebulizer suspension  500 mcg Nebulization BID RT    artificial saliva (MOUTH KOTE) 1 Spray  1 Spray Oral PRN    lactobac ac& pc-s.therm-b.anim (TIAN Q/RISAQUAD)  1 Cap Oral DAILY    oxyCODONE IR (ROXICODONE) tablet 5 mg  5 mg Oral Q6H PRN    balsam peru-castor oil (VENELEX) ointment   Topical TID    tamsulosin (FLOMAX) capsule 0.4 mg  0.4 mg Oral DAILY    insulin lispro (HUMALOG) injection   SubCUTAneous AC&HS    Warfarin MD/NP dosing   Other PRN    sodium chloride (NS) flush 5-40 mL  5-40 mL IntraVENous Q8H    sodium chloride (NS) flush 5-40 mL  5-40 mL IntraVENous PRN    acetaminophen (TYLENOL) tablet 650 mg  650 mg Oral Q6H PRN    naloxone (NARCAN) injection 0.4 mg  0.4 mg IntraVENous PRN    ondansetron (ZOFRAN) injection 4 mg  4 mg IntraVENous Q4H PRN    bisacodyl (DULCOLAX) tablet 5 mg  5 mg Oral DAILY PRN    sucralfate (CARAFATE) tablet 1 g  1 g Oral AC&HS    influenza vaccine 2019-20 (6 mos+)(PF) (FLUARIX/FLULAVAL/FLUZONE QUAD) injection 0.5 mL  0.5 mL IntraMUSCular PRIOR TO DISCHARGE    hydrALAZINE (APRESOLINE) 20 mg/mL injection 20 mg  20 mg IntraVENous Q6H PRN    dextrose 10 % infusion 125-250 mL  125-250 mL IntraVENous PRN         Thank you for allowing me to participate in this patient's care. Jesenia Ceballos NP  [address] [place] [address], [address] [facility]  Phone: [phone]  Fax: [phone]    [facility] ATTENDING ADDENDUM    Patient was seen and examined in person. Data and notes were reviewed. I have discussed and agree with the plan as noted in the NP note above without further additions.     Gene Glynn MD PhD  Shantell Hardin

## 2019-12-28 NOTE — PROGRESS NOTES
1930: Bedside and Verbal shift change report given to Cesar Henry, RN (oncoming nurse) by Georgi Crowell RN (offgoing nurse). Report included the following information SBAR, Kardex, Intake/Output, MAR, Recent Results and Cardiac Rhythm Paced. 0730: Bedside and Verbal shift change report given to [facility], RN (oncoming nurse) by Cesar Henry RN (offgoing nurse). Report included the following information SBAR, Kardex, Intake/Output, MAR, Recent Results and Cardiac Rhythm Paced.

## 2019-12-29 NOTE — PROGRESS NOTES
1930: Bedside and Verbal shift change report given to Donn Izaguirre RN (oncoming nurse) by Ayush Shahid RN (offgoing nurse). Report included the following information SBAR, Kardex, Intake/Output, MAR, Recent Results and Cardiac Rhythm Paced. 0730: Bedside and Verbal shift change report given to Ayush Shahid RN (oncoming nurse) by Donn Izaguirre RN (offgoing nurse). Report included the following information SBAR, Kardex, Intake/Output, MAR, Recent Results and Cardiac Rhythm Paced.

## 2019-12-29 NOTE — PROGRESS NOTES
[facility]   [address] [facility], [facility] [address], [facility]  Phone: [phone]   [phone]       Nephrology Progress Note  Meg Vera     1950     [id number]  Date of Admission : 10/25/2019  12/29/19    CC:  Follow up for JUAN J, CKD, Hyponatremia      Assessment and Plan   JUAN J on CKD:  - resolving and stable  - trial of pyridium for bladder/ penis pain   - check UA for fungal UTI   - daily labs      Hyponatremia :  - consider holding Bumex and give Tolvaptan   - he is probably IV volume depleted and cant handle both   - water restriction     Gross hematuria, BPH w/ retention:  - off CBI pre VAD. 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   CT in place    L arm clot s/p thrombectomy on 11/25    JOSE on CPAP      PAF s/p DCCV 6/19     Anemia:  - from blood loss s/p multiple blood products  - s/p IV iron,  Repeat iron studies ok  -  IV iron and increased Epogen ordered 12/25    D/w wife and RN        Interval History:    He is miserable w/ pain in penis and some bladder spasm   cloudly looking urine     Review of Systems: as per HPI    Current Medications:   Current Facility-Administered Medications   Medication Dose Route Frequency    bumetanide (BUMEX) injection 1 mg  1 mg IntraVENous BID    warfarin (COUMADIN) tablet 2 mg  2 mg Oral ONCE    phenazopyridine (PYRIDIUM) tablet 100 mg  100 mg Oral BID    magnesium oxide (MAG-OX) tablet 800 mg  800 mg Oral BID    albumin human 5% (BUMINATE) solution 12.5 g  12.5 g IntraVENous DAILY    milrinone (PRIMACOR) 20 MG/100 ML D5W infusion  0.2 mcg/kg/min IntraVENous CONTINUOUS    albumin human 5% (BUMINATE) solution 12.5 g  12.5 g IntraVENous DIALYSIS PRN    lidocaine (URO-JET/ GLYDO) 2 % jelly   Urethral PRN    senna-docusate (PERICOLACE) 8.6-50 mg per tablet 1 Tab  1 Tab Oral DAILY    epoetin yvonne-epbx (RETACRIT) injection 20,000 Units  20,000 Units SubCUTAneous Q MON, WED & FRI    levETIRAcetam (KEPPRA) tablet 125 mg  125 mg Oral BID    dronabinol (MARINOL) capsule 2.5 mg  2.5 mg Oral DAILY    polyethylene glycol (MIRALAX) packet 17 g  17 g Oral DAILY    albuterol-ipratropium (DUO-NEB) 2.5 MG-0.5 MG/3 ML  3 mL Nebulization Q6H PRN    therapeutic multivitamin (THERAGRAN) tablet 1 Tab  1 Tab Oral DAILY    escitalopram oxalate (LEXAPRO) tablet 10 mg  10 mg Oral QPM    potassium chloride SR (KLOR-CON 10) tablet 40 mEq  40 mEq Oral DAILY    alteplase (CATHFLO) 1 mg in sterile water (preservative free) 1 mL injection  1 mg InterCATHeter PRN    finasteride (PROSCAR) tablet 5 mg  5 mg Oral DAILY    spironolactone (ALDACTONE) tablet 25 mg  25 mg Oral DAILY    clonazePAM (KlonoPIN) tablet 0.5 mg  0.5 mg Oral TID PRN    sildenafil (pulm.hypertension) (REVATIO) tablet 40 mg  40 mg Oral TID    diphenhydrAMINE (BENADRYL) capsule 25 mg  25 mg Oral QHS PRN    octreotide (SANDOSTATIN) injection 25 mcg  25 mcg SubCUTAneous TID    benzocaine-menthol (CEPACOL) lozenge 1 Lozenge  1 Lozenge Mucous Membrane PRN    digoxin (LANOXIN) tablet 0.0625 mg  62.5 mcg Oral Q MON, WED & FRI    pantoprazole (PROTONIX) tablet 40 mg  40 mg Oral ACB    allopurinol (ZYLOPRIM) tablet 100 mg  100 mg Oral DAILY    arformoterol (BROVANA) neb solution 15 mcg  15 mcg Nebulization BID RT    And    budesonide (PULMICORT) 500 mcg/2 ml nebulizer suspension  500 mcg Nebulization BID RT    artificial saliva (MOUTH KOTE) 1 Spray  1 Spray Oral PRN    lactobac ac& pc-s.therm-b.anim (TIAN Q/RISAQUAD)  1 Cap Oral DAILY    oxyCODONE IR (ROXICODONE) tablet 5 mg  5 mg Oral Q6H PRN    balsam peru-castor oil (VENELEX) ointment Topical TID    tamsulosin (FLOMAX) capsule 0.4 mg  0.4 mg Oral DAILY    insulin lispro (HUMALOG) injection   SubCUTAneous AC&HS    Warfarin MD/NP dosing   Other PRN    sodium chloride (NS) flush 5-40 mL  5-40 mL IntraVENous Q8H    sodium chloride (NS) flush 5-40 mL  5-40 mL IntraVENous PRN    acetaminophen (TYLENOL) tablet 650 mg  650 mg Oral Q6H PRN    naloxone (NARCAN) injection 0.4 mg  0.4 mg IntraVENous PRN    ondansetron (ZOFRAN) injection 4 mg  4 mg IntraVENous Q4H PRN    bisacodyl (DULCOLAX) tablet 5 mg  5 mg Oral DAILY PRN    sucralfate (CARAFATE) tablet 1 g  1 g Oral AC&HS    influenza vaccine 2019-20 (6 mos+)(PF) (FLUARIX/FLULAVAL/FLUZONE QUAD) injection 0.5 mL  0.5 mL IntraMUSCular PRIOR TO DISCHARGE    hydrALAZINE (APRESOLINE) 20 mg/mL injection 20 mg  20 mg IntraVENous Q6H PRN    dextrose 10 % infusion 125-250 mL  125-250 mL IntraVENous PRN      No Known Allergies    Objective:  Vitals:    Vitals:    12/29/19 0450 12/29/19 0840 12/29/19 0934 12/29/19 1100   BP:       Pulse: 74 77  85   Resp: 18 20  16   Temp: 98.2 °F (36.8 °C) 98.9 °F (37.2 °C)  98.1 °F (36.7 °C)   SpO2: 98% 97% 96% 98%   Weight: 79.8 kg (175 lb 14.8 oz)      Height:         Intake and Output:  12/29 0701 - 12/29 1900  In: 427.6 [P.O.:300; I.V.:127.6]  Out: 575 [Urine:575]  12/27 1901 - 12/29 0700  In: 1732.4 [P.O.:1640; I.V.:92.4]  Out: 3225 [Urine:3225]    Physical Examination:    General: Elderly man in no acute distress  Neck:  No lines   Resp:  TA  CV:  VAD sounds; No LE edema  GI:  Soft and non-tender; no distension  Neurologic:  Alert and appropriate; normal speech  :  No Lizama    [x]    High complexity decision making was performed  [x]    Patient is at high-risk of decompensation with multiple organ involvement    Lab Data Personally Reviewed: I have reviewed all the pertinent labs, microbiology data and radiology studies during assessment.     Recent Labs     12/29/19  0501 12/28/19  0501 12/27/19  0410   NA 129* 128* 134*   K 4.0 4.5 4.4   CL 94* 96* 98   CO2 31 29 30   GLU 92 99 98   BUN 16 20 22*   CREA 1.24 1.28 1.40*   CA 8.9 8.6 8.5   MG 1.7 1.7 1.9   ALB 2.6* 2.4* 2.4*   SGOT 21 28 24   ALT 19 18 18   INR 1.9* 2.2* 1.9*     Recent Labs     12/29/19  0501 12/28/19  1814 12/28/19  0501 12/27/19  0410 12/26/19  1609   WBC 5.2  --  6.2 6.9  --    HGB 8.6* 8.7* 8.5* 8.7* 8.8*   HCT 27.2* 27.7* 27.5* 27.7* 28.2*     --  163 172  --      No results found for: SDES  Lab Results   Component Value Date/Time    Culture result: NO GROWTH 3 DAYS 12/26/2019 10:23 AM    Culture result: NO GROWTH 5 DAYS 12/15/2019 03:37 PM    Culture result: NO GROWTH 1 DAY 12/15/2019 03:30 PM    Culture result: NO GROWTH 5 DAYS 12/06/2019 11:23 PM    Culture result: HEAVY ENTEROCOCCUS SPECIES NOTED (A) 11/27/2019 11:10 AM    Culture result: LIGHT YEAST (A) 11/27/2019 11:10 AM     Recent Results (from the past 24 hour(s))   GLUCOSE, POC    Collection Time: 12/28/19  5:19 PM   Result Value Ref Range    Glucose (POC) 137 (H) 65 - 100 mg/dL    Performed by Alvin PAULINO (CON)    HGB & HCT    Collection Time: 12/28/19  6:14 PM   Result Value Ref Range    HGB 8.7 (L) 12.1 - 17.0 g/dL    HCT 27.7 (L) 36.6 - 50.3 %   GLUCOSE, POC    Collection Time: 12/28/19  9:36 PM   Result Value Ref Range    Glucose (POC) 151 (H) 65 - 100 mg/dL    Performed by Elisha Marshall    CBC W/O DIFF    Collection Time: 12/29/19  5:01 AM   Result Value Ref Range    WBC 5.2 4.1 - 11.1 K/uL    RBC 2.85 (L) 4.10 - 5.70 M/uL    HGB 8.6 (L) 12.1 - 17.0 g/dL    HCT 27.2 (L) 36.6 - 50.3 %    MCV 95.4 80.0 - 99.0 FL    MCH 30.2 26.0 - 34.0 PG    MCHC 31.6 30.0 - 36.5 g/dL    RDW 18.1 (H) 11.5 - 14.5 %    PLATELET 387 714 - 164 K/uL    MPV 9.0 8.9 - 12.9 FL    NRBC 0.0 0  WBC    ABSOLUTE NRBC 0.00 0.00 - 0.01 K/uL   PROTHROMBIN TIME + INR    Collection Time: 12/29/19  5:01 AM   Result Value Ref Range    INR 1.9 (H) 0.9 - 1.1      Prothrombin time 18.8 (H) 9.0 - 11.1 sec PTT    Collection Time: 12/29/19  5:01 AM   Result Value Ref Range    aPTT 40.9 (H) 22.1 - 32.0 sec    aPTT, therapeutic range     58.0 - 77.0 SECS   DIGOXIN    Collection Time: 12/29/19  5:01 AM   Result Value Ref Range    Digoxin level 0.8 (L) 0.90 - 2.00 NG/ML   LD    Collection Time: 12/29/19  5:01 AM   Result Value Ref Range     85 - 241 U/L   LACTIC ACID    Collection Time: 12/29/19  5:01 AM   Result Value Ref Range    Lactic acid 0.7 0.4 - 2.0 MMOL/L   PROCALCITONIN    Collection Time: 12/29/19  5:01 AM   Result Value Ref Range    Procalcitonin 0.06 ng/mL   TYPE & SCREEN    Collection Time: 12/29/19  5:01 AM   Result Value Ref Range    Crossmatch Expiration 01/01/2020     ABO/Rh(D) Velma Wheeler POSITIVE     Antibody screen NEG     Comment       Previously identified nonspecific antibody and nonspecific cold antibody    ROYER Poly POS     ROYER IgG POS     ROYER C3b/C3d NEG     Unit number [id number]     Blood component type [facility]     Unit division 00     Status of unit ALLOCATED     Crossmatch result Compatible     Unit number [id number]     Blood component type [facility]     Unit division 00     Status of unit ALLOCATED     Crossmatch result Compatible    MAGNESIUM    Collection Time: 12/29/19  5:01 AM   Result Value Ref Range    Magnesium 1.7 1.6 - 2.4 mg/dL   METABOLIC PANEL, COMPREHENSIVE    Collection Time: 12/29/19  5:01 AM   Result Value Ref Range    Sodium 129 (L) 136 - 145 mmol/L    Potassium 4.0 3.5 - 5.1 mmol/L    Chloride 94 (L) 97 - 108 mmol/L    CO2 31 21 - 32 mmol/L    Anion gap 4 (L) 5 - 15 mmol/L    Glucose 92 65 - 100 mg/dL    BUN 16 6 - 20 MG/DL    Creatinine 1.24 0.70 - 1.30 MG/DL    BUN/Creatinine ratio 13 12 - 20      GFR est AA >60 >60 ml/min/1.73m2    GFR est non-AA 58 (L) >60 ml/min/1.73m2    Calcium 8.9 8.5 - 10.1 MG/DL    Bilirubin, total 0.7 0.2 - 1.0 MG/DL    ALT (SGPT) 19 12 - 78 U/L    AST (SGOT) 21 15 - 37 U/L    Alk.  phosphatase 105 45 - 117 U/L    Protein, total 6.4 6.4 - 8.2 g/dL Albumin 2.6 (L) 3.5 - 5.0 g/dL    Globulin 3.8 2.0 - 4.0 g/dL    A-G Ratio 0.7 (L) 1.1 - 2.2     NT-PRO BNP    Collection Time: 12/29/19  5:01 AM   Result Value Ref Range    NT pro-BNP 3,497 (H) <125 PG/ML   GLUCOSE, POC    Collection Time: 12/29/19  7:17 AM   Result Value Ref Range    Glucose (POC) 100 65 - 100 mg/dL    Performed by Aguilar Agarwal MD

## 2019-12-29 NOTE — PROGRESS NOTES
[facility] in [place], [place]  Inpatient Progress Note      Patient name: Priyanka Arora  Patient : 1950  Patient MRN: [id number]  Attending MD: Corin Odell MD  Date of service: 19    Chief Complaint:   Nisreen Arteaga azucena LVAD implant     HPI: Sona Kiser is a 71y.o. year old pleasant white male with a history of HTN, HLD, JOSE, CAD s/p cardiac arrest VFib s/p CABG () c/b sternal would infection and sternectomy, ischemic cardiomyopathy LVEF 15-20%, s/p ICD and with LBBB. He was admitted with acute on chronic systolic heart failure with massive volume overload > 20 lbs, in the setting of atrial fibrillation s/p failed DCCV and underwent DCCV and RHC on .  S/p BiVICD on 19 with Dr. Tony Vivas. He was discharged home home on IV milrinone on 19. Jose L Bhandari has been followed closely by Dr. Darlin Thorne and the [facility].     Mr. Kiser was admitted for acute on chronic systolic heart failure. He underwent implantation of Impella 5.0 due to CS and has completed his LVAD evaluation. Jose L Bhandari meets criteria for implant of HM3 as DT. He was NYHA Class IV prior to implant of Impella 5.0, has LVEF < 15%, was intolerant of GDMT due to symptomatic hypotension and renal dysfunction. He remains dependent on temporary MCS support. RHC  revealed compensated hemodynamics on Impella. His renal function has improved dramatically with improvement in his hemodynamics. He is not a suitable transplant candidate due to single kidney, sternectomy, debility, and frailty. He was reviewed by Luisa Carson and felt to be a high risk heart transplant candidate due to multiple co-morbidities as well as the afore mentioned conditions.  He remained in the CCU and underwent a HM 3 implant as DT on .   He was weaned off of pressors and transferred to [place] [address] where he continues to undergo PT/OT and hemodynamic optimization.       24Hr Events  Hgb stable  Hematuria improved  Bladder spasms/Dysuria    Procedure:  Procedure(s):  REMOVAL TEMPORARY LEFT VENTRICULAR ASSIST DEVICE, IMPLANTATION OF LEFT VENTRICULAR ASSIST DEVICE PERMANENT (VAD), ECC, JACQUE, EPI AORTIC US BY DR Mame Arroyo.     POD:  40     Impression / Plan:   Heart Failure Status: NYHA Class IV    Chronic systolic heart failure due to ICM, NYHA Class IV, EF < 15%, cardiogenic shock bridged with Impella 5.0 support to HM 3 implant   S/p Impella removal and LVAD implant 11/18/19  RPMs 6400 rpm - frequent PI events  TTE with bubble study negative for PFO  Increase milrinone back to 0.2 mcg/kg/min- due to increased DOUGLAS  Obtain CardioMEMS readings daily  Increase bumex to 1mg BID    Cont Sildenafil 40 mg PO TID - rx sent to local pharmacy to initiate PA   Intolerant of BB due to RV failure  Intolerant of ACEi/ARB/ARNI/AA due to JUAN J on CKD 3  Strict I/O, daily weights, Na+ restricted diet   Continue LVAD education   Dressing change frequency three times weekly, Sutures removed 12/26/19  Delayed skin healing on bottom portion of sternotomy - will have CSS PA evaluate  TTE 12/16- EF 15-20%    Generalized myoclonus   Improved   Appreciate Neurology input  Decreased Keppra due to somnolence - 125 mg po BID   Head CT negative for acute process  EEG suggestive of mild generalized encephalopathic process, possibly related to underlying structural brain injury vs metabolic abnormality  Monitor closely      Anticoagulation for LVAD, INR goal 1.5-2  Goal decreased d/t persistent hematuria   No ASA d/t hematuria  INR 1.9  Coumadin - 2 mg tonight    Epistaxis  Resolved   Afrin soaked guaze  ENT consult appreciated  Monitor for now      Acute Respiratory Failure post op  Improved with aggressive diuresis  Off supplemental O2  Pulmonary hygiene   Cont home CPAP- must use while sleeping   Schedule PET CT prior to discharge    Acute on CKD 3, atrophic left kidney  Appreciate Nephrology assistance  Watch Cr - improving   Increase  Bumex to 1mg BID  Albumin today Avoid nephrotoxins, trend labs   Renally dose meds      CAD s/p CABG   Off ASA d/t hematuria  No BB d/t RV failure  Hold statin due to recent hepatic failure   LHC performed 11/13 - low likelihood of benefit from revascularization      Hx of VF arrest s/p BiV-ICD  No recent shocks  Keep K > 4 and Mg > 2   Mag ox to 800mg po BID    PAF   Dig level 0.8 -cont dig (62.5 mcg qMWF)  No BB due to RV failure   Repeat TFTs WNL     Hx of gout  Uric acid WNL    Acute blood loss anemia  Likely due to hematuria  Cont octreotide 25 mcg SQ TID   Fecal occult 12/14 negative, positive on 12/17  Appreciate urology recommendations  Hematuria improved  Monitor for now  Will d/w Urology about bladder spasms - Ditropan?      Suspected aspiration pneumonia  Sputum culture showing scant growth of yeast  Cefepime complete    Urinary retention, c/b serratia UTI and hematuria  Appreciate Urology consult   Repeat UA with cx negative 12/15   Watch off abx  Cystoscopy performed 11/13 shows catheter induced cystitis   Pyridium 100mg BID for bladder spasms     Sepsis Alert  Paired Blood Neg  Repeat paired cultures- pending   Urine cx negative   Sputum cx when able   ESR elevated to 61  Repeat ESR and Cortisol    Malnutrition   Appreciate Nutritionist consult  Prealbumin weekly - 20.1 on 12/23   Diet as tolerated  Intolerant of mirtazapine d/t tremors, confusion   Decreased Marinol to 2.5 mg PO qPM d/t lethargy  Cont MVI     COPD   Appreciate Pulmonology assistance   Continue nebs PRN       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  No additional treatment at this time     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  Etiology unclear  [facility]  Appreciate neurology assistance      Hx of sternal wound infection, sternectomy  Sternum closed post op- monitor      Vocal cord paralysis  Improved  ENT recs appreciated  Neck CT- R neck edema, no airway compromise   Speech therapy following - appreciate input    Anxiety  Klonipin 0.5 mg TID prn anxiety    Depression  Cont lexapro 10 mg qpm  Monitor QTc - 478 ms on 12/22   Monitor sodium      Debility  Continue PT/OT      Ppx  Protonix   PT/OT   Bowel regimen   PICC placed 11/22/19      Dispo: Will need IP rehab. Not ready for discharge at this time        LVAD INTERROGATION:  Device interrogated in person  Device function normal, normal flow, multiple PI events    LVAD   Pump Speed (RPM): 6450  Pump Flow (LPM): 5.8  MAP: 88  PI (Pulsitility Index): 3.5  Power: 5.7   Test: No  Back Up  at Bedside & Labeled: Yes  Power Module Test: No  Driveline Site Care: No  Driveline Dressing: Clean, Dry, and Intact  Outpatient: No  MAP in Therapeutic Range (Outpatient): Yes  Testing  Alarms Reviewed: Yes  Back up SC speed: 6400  Back up Low Speed Limit: 6000  Emergency Equipment with Patient?: Yes  Emergency procedures reviewed?: Yes  Drive line site inspected?: No(site covered by dressing)  Drive line intergrity inspected?: Yes  Drive line dressing changed?: No    PHYSICAL EXAM:  Visit Vitals  BP 91/72 (BP 1 Location: Left arm, BP Patient Position: At rest)   Pulse 85   Temp 98.1 °F (36.7 °C)   Resp 16   Ht 6' 2\" (1.88 m)   Wt 175 lb 14.8 oz (79.8 kg)   SpO2 98%   BMI 22.59 kg/m²       Physical Exam   Constitutional: He is oriented to person, place, and time. He appears well-developed. He appears cachectic. No distress. HENT:   Head: Normocephalic. Neck: Normal range of motion. Neck supple. No JVD present. Cardiovascular: Normal rate, regular rhythm and normal heart sounds. + VAD hum    Pulmonary/Chest: Effort normal and breath sounds normal. No respiratory distress. Abdominal: Soft. Bowel sounds are normal. He exhibits no distension. Musculoskeletal: Normal range of motion. General: No edema. Neurological: He is alert and oriented to person, place, and time. Skin: Skin is warm and dry. Psychiatric: He has a normal mood and affect.  His behavior is normal.   Nursing note and vitals reviewed. REVIEW OF SYSTEMS:  Review of Systems   Constitutional: Positive for malaise/fatigue. Negative for chills and fever. HENT: Positive for hearing loss. Negative for nosebleeds. Respiratory: Negative for cough and shortness of breath. Mild dyspnea   Cardiovascular: Negative for chest pain, palpitations, orthopnea and leg swelling. Gastrointestinal: Negative for heartburn, nausea and vomiting. Genitourinary: Positive for dysuria. Negative for hematuria. Bladder spasms    Musculoskeletal: Negative. Neurological: Positive for weakness. Negative for dizziness and headaches. Endo/Heme/Allergies: Does not bruise/bleed easily.        PAST MEDICAL HISTORY:  Past Medical History:   Diagnosis Date    Degenerative disc disease, lumbar     Heart failure ([facility] [address]     High cholesterol     Hypertension     Paroxysmal atrial fibrillation ([facility] [address] 4/2/2019    Spinal stenosis        PAST SURGICAL HISTORY:  Past Surgical History:   Procedure Laterality Date    COLONOSCOPY Left 11/11/2019    COLONOSCOPY at bedside performed by Bridgett Ballard MD at [facility] ENDOSCOPY    HX APPENDECTOMY      HX CORONARY ARTERY BYPASS GRAFT      triple    HX HERNIA REPAIR      HX IMPLANTABLE CARDIOVERTER DEFIBRILLATOR      CO CARDIOVERSION ELECTIVE ARRHYTHMIA EXTERNAL N/A 6/10/2019    EP CARDIOVERSION performed by Zahra Wheeler MD at [place] [address], [address] CATH LAB    CO CARDIOVERSION ELECTIVE ARRHYTHMIA EXTERNAL N/A 6/18/2019    EP CARDIOVERSION performed by Osman G[facility]mez MD [facility] [address] CATH LAB    CO INSJ/RPLCMT PERM DFB W/TRNSVNS LDS 1/DUAL CHMBR N/A 6/21/2019    INSERT ICD BIV MULTI performed by Mike Zamora MD [place] [address], [address] CATH LAB    CO TCAT IMPL WRLS P-ART PRS SNR L-T HEMODYN MNTR N/A 9/18/2019    IMPLANT HEART FAILURE MONITORING DEVICE performed by Solitario Pulliam MD at [place] [address], [address] CATH LAB       FAMILY HISTORY:  Family History   Problem Relation Age of Onset    Lung Disease Mother    Josue Thorne Hypertension Mother     Arthritis-osteo Mother     Heart Disease Mother     Heart Disease Father     Diabetes Father        SOCIAL HISTORY:  Social History     Socioeconomic History    Marital status:      Spouse name: Not on file    Number of children: Not on file    Years of education: Not on file    Highest education level: Not on file   Tobacco Use    Smoking status: Former Smoker     Last attempt to quit: 2010     Years since quittin.0    Smokeless tobacco: Never Used   Substance and Sexual Activity    Alcohol use: Not Currently     Comment: rarely    Drug use: Never   Other Topics Concern       LABORATORY RESULTS:     Labs Latest Ref Rng & Units 2019   WBC 4.1 - 11.1 K/uL 5.2 - 6.2 6.9 - 4.2 -   RBC 4.10 - 5.70 M/uL 2.85(L) - 2.91(L) 2.93(L) - 2.82(L) -   Hemoglobin 12.1 - 17.0 g/dL 8.6(L) 8.7(L) 8.5(L) 8.7(L) 8.8(L) 8.2(L) 8. 9(L)   Hematocrit 36.6 - 50.3 % 27. 2(L) 27. 7(L) 27. 5(L) 27. 7(L) 28. 2(L) 26. 4(L) 28. 5(L)   MCV 80.0 - 99.0 FL 95.4 - 94.5 94.5 - 93.6 -   Platelets 809 - 815 K/uL 159 - 163 172 - 166 -   Lymphocytes 12 - 49 % - - - - - - -   Monocytes 5 - 13 % - - - - - - -   Eosinophils 0 - 7 % - - - - - - -   Basophils 0 - 1 % - - - - - - -   Albumin 3.5 - 5.0 g/dL 2. 6(L) - 2. 4(L) 2. 4(L) - 2. 4(L) -   Calcium 8.5 - 10.1 MG/DL 8.9 - 8.6 8.5 - 8.8 -   SGOT 15 - 37 U/L 21 - 28 24 - 25 -   Glucose 65 - 100 mg/dL 92 - 99 98 - 93 -   BUN 6 - 20 MG/DL 16 - 20 22(H) - 27(H) -   Creatinine 0.70 - 1.30 MG/DL 1.24 - 1.28 1.40(H) - 1.52(H) -   Sodium 136 - 145 mmol/L 129(L) - 128(L) 134(L) - 132(L) -   Potassium 3.5 - 5.1 mmol/L 4.0 - 4.5 4.4 - 4.2 -   TSH 0.36 - 3.74 uIU/mL - - - - - - -   LDH 85 - 241 U/L 190 - 249(H) 237 - 208 -   CEA ng/mL - - - - - - -   Some recent data might be hidden     Lab Results   Component Value Date/Time    TSH 2.30 2019 03:29 AM    TSH 2.40 10/25/2019 07:39 PM    TSH 2.45 06/01/2019 04:16 AM       ALLERGY:  No Known Allergies     CURRENT MEDICATIONS:  Current Facility-Administered Medications   Medication Dose Route Frequency    bumetanide (BUMEX) injection 1 mg  1 mg IntraVENous BID    warfarin (COUMADIN) tablet 2 mg  2 mg Oral ONCE    phenazopyridine (PYRIDIUM) tablet 100 mg  100 mg Oral BID    magnesium oxide (MAG-OX) tablet 800 mg  800 mg Oral BID    albumin human 5% (BUMINATE) solution 12.5 g  12.5 g IntraVENous DAILY    milrinone (PRIMACOR) 20 MG/100 ML D5W infusion  0.2 mcg/kg/min IntraVENous CONTINUOUS    albumin human 5% (BUMINATE) solution 12.5 g  12.5 g IntraVENous DIALYSIS PRN    lidocaine (URO-JET/ GLYDO) 2 % jelly   Urethral PRN    senna-docusate (PERICOLACE) 8.6-50 mg per tablet 1 Tab  1 Tab Oral DAILY    epoetin yvonne-epbx (RETACRIT) injection 20,000 Units  20,000 Units SubCUTAneous Q MON, WED & FRI    levETIRAcetam (KEPPRA) tablet 125 mg  125 mg Oral BID    dronabinol (MARINOL) capsule 2.5 mg  2.5 mg Oral DAILY    polyethylene glycol (MIRALAX) packet 17 g  17 g Oral DAILY    albuterol-ipratropium (DUO-NEB) 2.5 MG-0.5 MG/3 ML  3 mL Nebulization Q6H PRN    therapeutic multivitamin (THERAGRAN) tablet 1 Tab  1 Tab Oral DAILY    escitalopram oxalate (LEXAPRO) tablet 10 mg  10 mg Oral QPM    potassium chloride SR (KLOR-CON 10) tablet 40 mEq  40 mEq Oral DAILY    alteplase (CATHFLO) 1 mg in sterile water (preservative free) 1 mL injection  1 mg InterCATHeter PRN    finasteride (PROSCAR) tablet 5 mg  5 mg Oral DAILY    spironolactone (ALDACTONE) tablet 25 mg  25 mg Oral DAILY    clonazePAM (KlonoPIN) tablet 0.5 mg  0.5 mg Oral TID PRN    sildenafil (pulm.hypertension) (REVATIO) tablet 40 mg  40 mg Oral TID    diphenhydrAMINE (BENADRYL) capsule 25 mg  25 mg Oral QHS PRN    octreotide (SANDOSTATIN) injection 25 mcg  25 mcg SubCUTAneous TID    benzocaine-menthol (CEPACOL) lozenge 1 Lozenge  1 Lozenge Mucous Membrane PRN    digoxin (LANOXIN) tablet 0.0625 mg  62.5 mcg Oral Q MON, WED & FRI    pantoprazole (PROTONIX) tablet 40 mg  40 mg Oral ACB    allopurinol (ZYLOPRIM) tablet 100 mg  100 mg Oral DAILY    arformoterol (BROVANA) neb solution 15 mcg  15 mcg Nebulization BID RT    And    budesonide (PULMICORT) 500 mcg/2 ml nebulizer suspension  500 mcg Nebulization BID RT    artificial saliva (MOUTH KOTE) 1 Spray  1 Spray Oral PRN    lactobac ac& pc-s.therm-b.anim (TIAN Q/RISAQUAD)  1 Cap Oral DAILY    oxyCODONE IR (ROXICODONE) tablet 5 mg  5 mg Oral Q6H PRN    balsam peru-castor oil (VENELEX) ointment   Topical TID    tamsulosin (FLOMAX) capsule 0.4 mg  0.4 mg Oral DAILY    insulin lispro (HUMALOG) injection   SubCUTAneous AC&HS    Warfarin MD/NP dosing   Other PRN    sodium chloride (NS) flush 5-40 mL  5-40 mL IntraVENous Q8H    sodium chloride (NS) flush 5-40 mL  5-40 mL IntraVENous PRN    acetaminophen (TYLENOL) tablet 650 mg  650 mg Oral Q6H PRN    naloxone (NARCAN) injection 0.4 mg  0.4 mg IntraVENous PRN    ondansetron (ZOFRAN) injection 4 mg  4 mg IntraVENous Q4H PRN    bisacodyl (DULCOLAX) tablet 5 mg  5 mg Oral DAILY PRN    sucralfate (CARAFATE) tablet 1 g  1 g Oral AC&HS    influenza vaccine 2019-20 (6 mos+)(PF) (FLUARIX/FLULAVAL/FLUZONE QUAD) injection 0.5 mL  0.5 mL IntraMUSCular PRIOR TO DISCHARGE    hydrALAZINE (APRESOLINE) 20 mg/mL injection 20 mg  20 mg IntraVENous Q6H PRN    dextrose 10 % infusion 125-250 mL  125-250 mL IntraVENous PRN         Thank you for allowing me to participate in this patient's care. Lissette Murphy NP  [address] [place] [address], Suite [facility]  Phone: [phone]  Fax: [phone]    [place] ATTENDING ADDENDUM    Patient was seen and examined in person. Data and notes were reviewed. I have discussed and agree with the plan as noted in the NP note above without further additions.     Ben Saris, MD PhD  [facility] [id number] [facility]

## 2019-12-29 NOTE — PROGRESS NOTES
1155hrs:  RN spoke with Dr. Thomas Christine, Nephrology. He recommended that the pyridium be given for \"3 or 4 doses, no more than that. I don't want him on it for too long. \"  RN will put stop date in for this medication. Verbal order for UA as well due to hazy/cloudiness of urine today. 1845hrs:  RN notified Dr. Thomas Christine of UA results. Telephone order received for 1g Ancef Q12H x7days. Notify Heart Failure team with results and plan of care. RN paged on call MD.    1900hrsPreston TIERRA Boland returned phone call. Agrees that antibiotic is necessary. She advised RN to call Dr. Gaurav Boo from ID for further input on antibiotics. 1930hrs:   Pharmacy called RN to verify order and ordering provider. 2000hrs:  RN followed up with Amelia Roldan MD.  Telephone order to continue with Dr. Rajeev Nickerson recommended antibiotic order.

## 2019-12-30 NOTE — PROGRESS NOTES
OCCUPATIONAL THERAPY TREATMENT  Patient: Zay Wheeler (70 y.o. male)  Date: 12/30/2019  Diagnosis: Acute decompensated heart failure ([facility] [address] [I50.9]   <principal problem not specified>  Procedure(s) (LRB):  LEFT BRACHIAL THROMBECTOMY (Left) 35 Days Post-Op  Precautions: Fall, Sternal(LVAD )  Chart, occupational therapy assessment, plan of care, and goals were reviewed. ASSESSMENT  Patient continues with skilled OT services and is progressing towards goals. Patient limited by orthostatic hypotension today. Current Level of Function Impacting Discharge (ADLs): total A lower body ADLs         PLAN :  Patient continues to benefit from skilled intervention to address the above impairments. Continue treatment per established plan of care. to address goals. Recommend with staff: continued bed in chair position and catalina to chair as able    Recommend next OT session: EOB sitting (patient did good unsupported sitting today)    Recommendation for discharge: (in order for the patient to meet his/her long term goals)  To be determined: This discharge recommendation:  Has not yet been discussed the attending provider and/or case management    IF patient discharges home will need the following DME:        SUBJECTIVE:   Patient stated I am so tired, more than normal.    OBJECTIVE DATA SUMMARY:   Cognitive/Behavioral Status:  Neurologic State: Alert  Orientation Level: Oriented X4  Cognition: Appropriate decision making; Appropriate for age attention/concentration; Appropriate safety awareness; Follows commands             Functional Mobility and Transfers for ADLs:  Bed Mobility:       Transfers:  Sit to Stand: Minimum assistance;Assist x2          Balance:  Sitting: Intact; Without support    ADL Intervention:   Patient received this morning with wound care, then on bedpan for BM and this time sleeping with bed in chair position and foot board on but easily aroused.  Encouragement to participate through fatigue. Grooming  Brushing Teeth: Supervision(setup on beside tray)                                  Therapeutic Exercises:   Patient instructed on benefits and demonstrated shoulder flexion 5 reps 2 sets v. Gravity and IR stretch and hold 2 reps with supervision, sitting supported. Written on white board to complete daily. Pain:      Activity Tolerance:   requires frequent rest breaks  Please refer to the flowsheet for vital signs taken during this treatment.     After treatment patient left in no apparent distress:   Sitting in chair, Call bell within reach, and Side rails x 3    COMMUNICATION/COLLABORATION:   The patients plan of care was discussed with: Physical Therapy Assistant and Registered Nurse    Ollie Hall  Time Calculation: 13 mins

## 2019-12-30 NOTE — PROGRESS NOTES
ID Progress Note  2019    Subjective:     Doing ok, seems to be feeling stronger    Objective:     Antibiotics:  1. Levaquin  2. Rifampin   3. Fluconazole  4. Vancomycin    5. Cefazolin   6. Zosyn       Vitals:   Visit Vitals  BP 91/72 (BP 1 Location: Left arm, BP Patient Position: At rest)   Pulse 89   Temp 98.5 °F (36.9 °C)   Resp 18   Ht 6' 2\" (1.88 m)   Wt 74.7 kg (164 lb 10.9 oz)   SpO2 99%   BMI 21.14 kg/m²        Tmax:  Temp (24hrs), Av.3 °F (36.8 °C), Min:98.1 °F (36.7 °C), Max:98.5 °F (36.9 °C)      Exam:  Lungs:  clear to auscultation bilaterally  Heart:  regular rate and rhythm  Abdomen:  soft, non-tender. Bowel sounds normal. No masses,  no organomegaly      Labs:      Recent Labs     19  0415 19  1815 19  0501 19  1814 19  0501   WBC 6.3  --  5.2  --  6.2   HGB 7.5* 8.4* 8.6* 8.7* 8.5*     --  159  --  163   BUN 15  --  16  --  20   CREA 1.30  --  1.24  --  1.28   SGOT 23  --  21  --  28     --  105  --  114   TBILI 0.8  --  0.7  --  0.6       Cultures:     No results found for: [facility]  Lab Results   Component Value Date/Time    Culture result: PSEUDOMONAS SPECIES (PREDOMINATING) (A) 2019 03:40 PM    Culture result: (A) 2019 03:40 PM     LACTOSE FERMENTING GRAM NEGATIVE RODS (2,000 COLONIES/mL)    Culture result: NO GROWTH 4 DAYS 2019 10:23 AM       Radiology:     Line/Insert Date:           Assessment:     1. UTI--Serratia (2 biotypes) from culture and small amount of Enterococcus--now with Pseudomonas from culture  2. CHF/cardiomyopathy  3. ?aspiration event(s)  4. Renal insufficiency  5. Respiratory difficulties    Objective:     1.  Adjust antibiotic therapy      Shea León MD

## 2019-12-30 NOTE — PROGRESS NOTES
Day #1 of Cefepime  Indication:  UTI  Current regimen:  2G IV q8  Abx regimen: Cefepime  Recent Labs     19  0415 19  0501 19  0501   WBC 6.3 5.2 6.2   CREA 1.30 1.24 1.28   BUN 15 16 20     Est CrCl: 56 ml/min  Temp (24hrs), Av.2 °F (36.8 °C), Min:97.8 °F (36.6 °C), Max:98.5 °F (36.9 °C)    Cultures:    blood - NGTD   urine - >100k pseudomonas; GNR    Plan: Change to 2G IV q24h for CrCl 30-60 ml/min and UTI indication per [facility] P&T Committee Protocol with respect to renal function. Pharmacy will continue to monitor patient daily and will make dosage adjustments based upon changing renal function.

## 2019-12-30 NOTE — PROGRESS NOTES
2000: Bedside and Verbal shift change report given to [facility], RN (oncoming nurse) by Alex Solorzano RN (offgoing nurse). Report included the following information SBAR, Kardex, Procedure Summary, Intake/Output, MAR, Recent Results and Cardiac Rhythm Paced. 0912: RNs assisted patient to standing scale. After several seconds, patient had a syncopal episode. Patient's body became very rigid, he was unresponsive, shaking, eyes rolled in back of head. Patient had to be lifted onto the bed. Patient regained consciousness and repositioned in bed. Patient then felt nauseous. Zofran administered. Patient now resting comfortably in bed.     4134: Paged heart failure. Spoke with Blaze Diamond NP. No new orders received. 6834: Recheck patient's MAP. MAP was 94. No alarms with LVAD.    0730: Bedside and Verbal shift change report given to [address] [place] [address], [address] [facility], RN (oncoming nurse) by [facility], RN (offgoing nurse). Report included the following information SBAR, Kardex, Procedure Summary, Intake/Output, MAR, Accordion, Recent Results and Cardiac Rhythm Paced.

## 2019-12-30 NOTE — PROGRESS NOTES
[facility] in [place], [place]  Inpatient Progress Note      Patient name: Marta Ovalles  Patient : 1950  Patient MRN: [id number]  Attending MD: Luisa Bailey MD  Date of service: 19    Chief Complaint:   Burt Thomasckle azucena LVAD implant     HPI: Sona Kiser is a 71y.o. year old pleasant white male with a history of HTN, HLD, JOSE, CAD s/p cardiac arrest VFib s/p CABG () c/b sternal would infection and sternectomy, ischemic cardiomyopathy LVEF 15-20%, s/p ICD and with LBBB. He was admitted with acute on chronic systolic heart failure with massive volume overload > 20 lbs, in the setting of atrial fibrillation s/p failed DCCV and underwent DCCV and RHC on .  S/p BiVICD on 19 with Dr. Camille Duarte. He was discharged home home on IV milrinone on 19. Skyler Harry has been followed closely by Dr. Jarrod Yen and the [facility].     Mr. Kiser was admitted for acute on chronic systolic heart failure. He underwent implantation of Impella 5.0 due to CS and has completed his LVAD evaluation. Skyler Harry meets criteria for implant of HM3 as DT. He was NYHA Class IV prior to implant of Impella 5.0, has LVEF < 15%, was intolerant of GDMT due to symptomatic hypotension and renal dysfunction. He remains dependent on temporary MCS support. RHC  revealed compensated hemodynamics on Impella. His renal function has improved dramatically with improvement in his hemodynamics. He is not a suitable transplant candidate due to single kidney, sternectomy, debility, and frailty. He was reviewed by Asad Zimmerman and felt to be a high risk heart transplant candidate due to multiple co-morbidities as well as the afore mentioned conditions.  He remained in the CCU and underwent a HM 3 implant as DT on .   He was weaned off of pressors and transferred to [place] [address] where he continues to undergo PT/OT and hemodynamic optimization.       24Hr Events  Hgb down today  Syncopal today when getting to scale Procedure:  Procedure(s):  REMOVAL TEMPORARY LEFT VENTRICULAR ASSIST DEVICE, IMPLANTATION OF LEFT VENTRICULAR ASSIST DEVICE PERMANENT (VAD), ECC, JACQUE, EPI AORTIC US BY DR Estrella Felton.     POD:  41     Impression / Plan:   Heart Failure Status: NYHA Class IV    Chronic systolic heart failure due to ICM, NYHA Class IV, EF < 15%, cardiogenic shock bridged with Impella 5.0 support to HM 3 implant   S/p Impella removal and LVAD implant 11/18/19  RPMs 6400 rpm - frequent PI events  TTE with bubble study negative for PFO   Decrease milrinone back to 0.125 mcg/kg/min-for syncopal   Obtain CardioMEMS readings daily  Hold diuretics today  Decrease Sildenafil to20 mg PO TID due to syncope - rx sent to local pharmacy to initiate PA   Intolerant of BB due to RV failure  Intolerant of ACEi/ARB/ARNI/AA due to JUAN J on CKD 3  Strict I/O, daily weights, Na+ restricted diet   Continue LVAD education   Dressing change frequency three times weekly, Sutures removed 12/26/19  Delayed skin healing on bottom portion of sternotomy- evaluated by CTS, no intervention   TTE 12/16- EF 15-20%    Generalized myoclonus   Improved   Appreciate Neurology input  Decreased Keppra due to somnolence - 125 mg po BID   Head CT negative for acute process  EEG suggestive of mild generalized encephalopathic process, possibly related to underlying structural brain injury vs metabolic abnormality  Monitor closely      Anticoagulation for LVAD, INR goal 1.5-2  Goal decreased d/t persistent hematuria   No ASA d/t hematuria  INR 1.9  Coumadin - 2 mg tonight    Epistaxis  Resolved      Acute Respiratory Failure post op  Improved with aggressive diuresis  Off supplemental O2  Pulmonary hygiene   Cont home CPAP- must use while sleeping   Schedule PET CT prior to discharge    Acute on CKD 3, atrophic left kidney  Appreciate Nephrology assistance  Watch Cr - improving   Hold diuretics for positive orthostatics   Albumin today    Avoid nephrotoxins, trend labs   Renally dose meds      CAD s/p CABG   Off ASA d/t hematuria  No BB d/t RV failure  Hold statin due to recent hepatic failure   LHC performed 11/13 - low likelihood of benefit from revascularization      Hx of VF arrest s/p BiV-ICD  No recent shocks  Keep K > 4 and Mg > 2   Mag ox to 800mg po BID    PAF   Dig level 0.7 -cont dig (62.5 mcg qMWF)  No BB due to RV failure   Repeat TFTs WNL     Hx of gout  Uric acid WNL    Acute blood loss anemia  Likely due to hematuria  Cont octreotide 25 mcg SQ TID   Fecal occult 12/14 negative, positive on 12/17  Appreciate urology recommendations  Hematuria improved  Monitor for now  Will d/w Urology about bladder spasms      Suspected aspiration pneumonia  Sputum culture showing scant growth of yeast  Cefepime complete    Urinary retention, c/b serratia UTI and hematuria  Appreciate Urology consult   Repeat UA with cx negative 12/15   Augmentin for positive UA- culture pending   Cystoscopy performed 11/13 shows catheter induced cystitis   Pyridium 100mg BID for bladder spasms     Sepsis Alert  Paired Blood Neg  Repeat paired cultures- pending   Sputum cx when able   ESR elevated to 63    Malnutrition   Appreciate Nutritionist consult  Prealbumin weekly - 20.1 on 12/23   Diet as tolerated  Intolerant of mirtazapine d/t tremors, confusion   Decreased Marinol to 2.5 mg PO qPM d/t lethargy  Cont MVI     COPD   Appreciate Pulmonology assistance   Continue nebs PRN       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  No additional treatment at this time     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  Etiology unclear  [facility]  Appreciate neurology assistance      Hx of sternal wound infection, sternectomy  Sternum closed post op- monitor      Vocal cord paralysis  Improved  ENT recs appreciated  Neck CT- R neck edema, no airway compromise   Speech therapy following - appreciate input    Anxiety  Klonipin 0.5 mg TID prn anxiety    Depression  Cont lexapro 10 mg qpm  Monitor QTc - 478 ms on 12/22   Monitor sodium    Debility  Continue PT/OT      Ppx  Protonix   PT/OT   Bowel regimen   PICC placed 11/22/19      Dispo: Will need IP rehab. Not ready for discharge at this time        LVAD INTERROGATION:  Device interrogated in person  Device function normal, normal flow, multiple PI events    LVAD   Pump Speed (RPM): 6400  Pump Flow (LPM): 6.1  MAP: 78  PI (Pulsitility Index): 2.1  Power: 5.6   Test: Yes  Back Up  at Bedside & Labeled: Yes  Power Module Test: Yes  Driveline Site Care: Yes  Driveline Dressing: Changed per order  Outpatient: No  MAP in Therapeutic Range (Outpatient): Yes  Testing  Alarms Reviewed: Yes  Back up SC speed: 6400  Back up Low Speed Limit: 6000  Emergency Equipment with Patient?: Yes  Emergency procedures reviewed?: Yes  Drive line site inspected?: No(site covered by dressing)  Drive line intergrity inspected?: Yes  Drive line dressing changed?: No    PHYSICAL EXAM:  Visit Vitals  BP 91/72 (BP 1 Location: Left arm, BP Patient Position: At rest)   Pulse 89   Temp 98.5 °F (36.9 °C)   Resp 18   Ht 6' 2\" (1.88 m)   Wt 164 lb 10.9 oz (74.7 kg)   SpO2 99%   BMI 21.14 kg/m²       Physical Exam   Constitutional: He is oriented to person, place, and time. He appears well-developed. He appears cachectic. No distress. HENT:   Head: Normocephalic. Neck: Normal range of motion. Neck supple. No JVD present. Cardiovascular: Normal rate, regular rhythm and normal heart sounds. + VAD hum    Pulmonary/Chest: Effort normal and breath sounds normal. No respiratory distress. Abdominal: Soft. Bowel sounds are normal. He exhibits no distension. Musculoskeletal: Normal range of motion. General: No edema. Neurological: He is alert and oriented to person, place, and time. Skin: Skin is warm and dry. Psychiatric: He has a normal mood and affect. His behavior is normal.   Nursing note and vitals reviewed.       REVIEW OF SYSTEMS:  Review of Systems   Constitutional: Positive for malaise/fatigue. Negative for chills and fever. HENT: Positive for hearing loss. Negative for nosebleeds. Respiratory: Negative for cough and shortness of breath. Mild dyspnea   Cardiovascular: Negative for chest pain, palpitations, orthopnea and leg swelling. Gastrointestinal: Negative for heartburn, nausea and vomiting. Genitourinary: Positive for dysuria. Negative for hematuria. Bladder spasms    Musculoskeletal: Negative. Neurological: Positive for dizziness and weakness. Negative for headaches. Endo/Heme/Allergies: Does not bruise/bleed easily.        PAST MEDICAL HISTORY:  Past Medical History:   Diagnosis Date    Degenerative disc disease, lumbar     Heart failure ([facility] [address]     High cholesterol     Hypertension     Paroxysmal atrial fibrillation ([facility] [address] 4/2/2019    Spinal stenosis        PAST SURGICAL HISTORY:  Past Surgical History:   Procedure Laterality Date    COLONOSCOPY Left 11/11/2019    COLONOSCOPY at bedside performed by [facility]aker [facility] at [facility] ENDOSCOPY    HX APPENDECTOMY      HX CORONARY ARTERY BYPASS GRAFT      triple    HX HERNIA REPAIR      HX IMPLANTABLE CARDIOVERTER DEFIBRILLATOR      AR CARDIOVERSION ELECTIVE ARRHYTHMIA EXTERNAL N/A 6/10/2019    EP CARDIOVERSION performed by Jennifer Fisher MD at [place] [address], [address] CATH LAB    AR CARDIOVERSION ELECTIVE ARRHYTHMIA EXTERNAL N/A 6/18/2019    EP CARDIOVERSION performed by Ashley Fowler MD at [place] [address], [address] CATH LAB    AR INSJ/RPLCMT PERM DFB W/TRNSVNS LDS 1/DUAL [address] N/A 6/21/2019    INSERT ICD BIV MULTI performed by Marissa Matos MD at [place] [address], [address] CATH LAB    AR TCAT IMPL WRLS P-ART PRS SNR L-T HEMODYN MNTR N/A 9/18/2019    IMPLANT HEART FAILURE MONITORING DEVICE performed by Liv Piedra MD at [place] [address], [address] CATH LAB       FAMILY HISTORY:  Family History   Problem Relation Age of Onset    Lung Disease Mother     Hypertension Mother     Arthritis-osteo Mother     Heart Disease Mother  Heart Disease Father     Diabetes Father        SOCIAL HISTORY:  Social History     Socioeconomic History    Marital status:      Spouse name: Not on file    Number of children: Not on file    Years of education: Not on file    Highest education level: Not on file   Tobacco Use    Smoking status: Former Smoker     Last attempt to quit: 2010     Years since quittin.0    Smokeless tobacco: Never Used   Substance and Sexual Activity    Alcohol use: Not Currently     Comment: rarely    Drug use: Never   Other Topics Concern       LABORATORY RESULTS:     Labs Latest Ref Rng & Units 2019   WBC 4.1 - 11.1 K/uL 6.3 - 5.2 - 6.2 6.9 -   RBC 4.10 - 5.70 M/uL 2.49(L) - 2.85(L) - 2.91(L) 2.93(L) -   Hemoglobin 12.1 - 17.0 g/dL 7. 5(L) 8.4(L) 8.6(L) 8.7(L) 8.5(L) 8.7(L) 8.8(L)   Hematocrit 36.6 - 50.3 % 23. 9(L) 27. 2(L) 27. 2(L) 27. 7(L) 27. 5(L) 27. 7(L) 28. 2(L)   MCV 80.0 - 99.0 FL 96.0 - 95.4 - 94.5 94.5 -   Platelets 960 - 427 K/uL 179 - 159 - 163 172 -   Lymphocytes 12 - 49 % - - - - - - -   Monocytes 5 - 13 % - - - - - - -   Eosinophils 0 - 7 % - - - - - - -   Basophils 0 - 1 % - - - - - - -   Albumin 3.5 - 5.0 g/dL 2. 7(L) - 2. 6(L) - 2. 4(L) 2. 4(L) -   Calcium 8.5 - 10.1 MG/DL 9.0 - 8.9 - 8.6 8.5 -   SGOT 15 - 37 U/L  - 28 24 -   Glucose 65 - 100 mg/dL 103(H) - 92 - 99 98 -   BUN 6 - 20 MG/DL 15 - 16 - 20 22(H) -   Creatinine 0.70 - 1.30 MG/DL 1.30 - 1.24 - 1.28 1.40(H) -   Sodium 136 - 145 mmol/L 128(L) - 129(L) - 128(L) 134(L) -   Potassium 3.5 - 5.1 mmol/L 4.1 - 4.0 - 4.5 4.4 -   TSH 0.36 - 3.74 uIU/mL - - - - - - -   LDH 85 - 241 U/L 228 - 190 - 249(H) 237 -   CEA ng/mL - - - - - - -   Some recent data might be hidden     Lab Results   Component Value Date/Time    TSH 2.30 2019 03:29 AM    TSH 2.40 10/25/2019 07:39 PM    TSH 2.45 2019 04:16 AM       ALLERGY:  No Known Allergies     CURRENT MEDICATIONS:  Current Facility-Administered Medications   Medication Dose Route Frequency    warfarin (COUMADIN) tablet 2 mg  2 mg Oral ONCE    albumin human 5% (BUMINATE) solution 12.5 g  12.5 g IntraVENous ONCE    [Held by provider] bumetanide (BUMEX) injection 1 mg  1 mg IntraVENous BID    phenazopyridine (PYRIDIUM) tablet 100 mg  100 mg Oral BID    ceFAZolin (ANCEF) 1 g in 0.9% sodium chloride (MBP/ADV) 50 mL  1 g IntraVENous Q8H    magnesium oxide (MAG-OX) tablet 800 mg  800 mg Oral BID    milrinone (PRIMACOR) 20 MG/100 ML D5W infusion  0.2 mcg/kg/min IntraVENous CONTINUOUS    albumin human 5% (BUMINATE) solution 12.5 g  12.5 g IntraVENous DIALYSIS PRN    lidocaine (URO-JET/ GLYDO) 2 % jelly   Urethral PRN    senna-docusate (PERICOLACE) 8.6-50 mg per tablet 1 Tab  1 Tab Oral DAILY    epoetin yvonne-epbx (RETACRIT) injection 20,000 Units  20,000 Units SubCUTAneous Q MON, WED & FRI    levETIRAcetam (KEPPRA) tablet 125 mg  125 mg Oral BID    dronabinol (MARINOL) capsule 2.5 mg  2.5 mg Oral DAILY    polyethylene glycol (MIRALAX) packet 17 g  17 g Oral DAILY    albuterol-ipratropium (DUO-NEB) 2.5 MG-0.5 MG/3 ML  3 mL Nebulization Q6H PRN    therapeutic multivitamin (THERAGRAN) tablet 1 Tab  1 Tab Oral DAILY    escitalopram oxalate (LEXAPRO) tablet 10 mg  10 mg Oral QPM    potassium chloride SR (KLOR-CON 10) tablet 40 mEq  40 mEq Oral DAILY    alteplase (CATHFLO) 1 mg in sterile water (preservative free) 1 mL injection  1 mg InterCATHeter PRN    finasteride (PROSCAR) tablet 5 mg  5 mg Oral DAILY    spironolactone (ALDACTONE) tablet 25 mg  25 mg Oral DAILY    clonazePAM (KlonoPIN) tablet 0.5 mg  0.5 mg Oral TID PRN    sildenafil (pulm.hypertension) (REVATIO) tablet 40 mg  40 mg Oral TID    diphenhydrAMINE (BENADRYL) capsule 25 mg  25 mg Oral QHS PRN    octreotide (SANDOSTATIN) injection 25 mcg  25 mcg SubCUTAneous TID    benzocaine-menthol (CEPACOL) lozenge 1 Lozenge  1 Lozenge Mucous Membrane PRN    digoxin (LANOXIN) tablet 0.0625 mg  62.5 mcg Oral Q MON, WED & FRI    pantoprazole (PROTONIX) tablet 40 mg  40 mg Oral ACB    allopurinol (ZYLOPRIM) tablet 100 mg  100 mg Oral DAILY    arformoterol (BROVANA) neb solution 15 mcg  15 mcg Nebulization BID RT    And    budesonide (PULMICORT) 500 mcg/2 ml nebulizer suspension  500 mcg Nebulization BID RT    artificial saliva (MOUTH KOTE) 1 Spray  1 Spray Oral PRN    lactobac ac& pc-s.therm-b.anim (TIAN Q/RISAQUAD)  1 Cap Oral DAILY    oxyCODONE IR (ROXICODONE) tablet 5 mg  5 mg Oral Q6H PRN    balsam peru-castor oil (VENELEX) ointment   Topical TID    tamsulosin (FLOMAX) capsule 0.4 mg  0.4 mg Oral DAILY    insulin lispro (HUMALOG) injection   SubCUTAneous AC&HS    Warfarin MD/NP dosing   Other PRN    sodium chloride (NS) flush 5-40 mL  5-40 mL IntraVENous Q8H    sodium chloride (NS) flush 5-40 mL  5-40 mL IntraVENous PRN    acetaminophen (TYLENOL) tablet 650 mg  650 mg Oral Q6H PRN    naloxone (NARCAN) injection 0.4 mg  0.4 mg IntraVENous PRN    ondansetron (ZOFRAN) injection 4 mg  4 mg IntraVENous Q4H PRN    bisacodyl (DULCOLAX) tablet 5 mg  5 mg Oral DAILY PRN    sucralfate (CARAFATE) tablet 1 g  1 g Oral AC&HS    influenza vaccine 2019-20 (6 mos+)(PF) (FLUARIX/FLULAVAL/FLUZONE QUAD) injection 0.5 mL  0.5 mL IntraMUSCular PRIOR TO DISCHARGE    hydrALAZINE (APRESOLINE) 20 mg/mL injection 20 mg  20 mg IntraVENous Q6H PRN    dextrose 10 % infusion 125-250 mL  125-250 mL IntraVENous PRN         Thank you for allowing me to participate in this patient's care. Sergio Carey NP  [facility] [address], [address]  Phone: [phone]  Fax: [phone]    [facility] ATTENDING ADDENDUM    Patient was seen and examined in person. Data and notes were reviewed. I have discussed and agree with the plan as noted in the NP note above without further additions.     Keri Haynes MD PhD [facility]

## 2019-12-30 NOTE — PROGRESS NOTES
Problem: Mobility Impaired (Adult and Pediatric)  Goal: *Acute Goals and Plan of Care (Insert Text)  Description  FUNCTIONAL STATUS PRIOR TO ADMISSION: Patient was modified independent using a single point cane for functional mobility. Patient reports an increasingly sedentary lifestyle 2* fatigue and SOB/dyspnea. Retired (so is his wife). Patient reports x 3 falls within the last couple of weeks. Patient is wearing either nasal cannula or CPAP at night. LVAD work-up has been initiated. HOME SUPPORT PRIOR TO ADMISSION: The patient lived with his wife, but did not require physical assistance. Physical Therapy Goals  Reassessed on 12/26/2019, goals not met but remain appropriate, so carry over          Physical Therapy Goals:    Weekly reassessment completed 12/19/2019 and goals downgraded as appropriate. 1.  Patient will move from supine to sit and sit to supine, scoot up and down and roll side to side in bed with contact guard assistance within 7 days. 2.  Patient will perform sit to/from stand with minimal assistance x 1 within 7 days. 3.  Patient will ambulate 25 feet with least restrictive assistive device and moderate assistance within 7 days. 4.  Stair goal to be formulated when appropriate. 5.  Patient will perform cardiac exercises per protocol with supervision within 7 days. 6.  Patient will verbally and functionally recall mindful movement principles (Move in the Tube) with minimal verbal cuing/reminding within 7 days. 7.  Patient will perform power exchange for power module to/from battery with moderate assistance within 7 days. Re-evaluation completed 11/20/2019 and new goals formulated following LVAD implantation. Reviewed and cont 12/3/2019. Reviewed and continued 12/12/2019  1. Patient will move from supine to sit and sit to supine, scoot up and down and roll side to side in bed with minimal assistance/contact guard assist within 7 days.     2.  Patient will perform sit to/from stand with minimal assistance/contact guard assist within 7 days. 3.  Patient will ambulate 150 feet with least restrictive assistive device and minimal assistance/contact guard assist within 7 days. 4.  Patient will ascend/descend 4 stairs with handrail(s) with minimal assistance/contact guard assist within 7 days. 5.  Patient will perform cardiac exercises per protocol with supervision within 7 days. 6.  Patient will verbally and functionally recall 3/3 sternal precautions within 7 days. 7.  Patient will perform power exchange for power module to/from battery with moderate assistance  within 7 days. Initiated 10/27/2019; updated 11/15/2019   1. Patient will move from supine to sit and sit to supine, scoot up and down and roll side to side in bed with independence within 7 days. 2.  Patient will perform sit to/from stand with supervision/set-up within 7 days. 3.  Patient will ambulate 200 feet with least restrictive assistive device and supervision/set-up within 7 days. 4.  Patient will ascend/descend 4 stairs with  handrail(s) with supervision/set-up within 7 days for functional strengthening and community reintegration. .  5.  Patient will verbally and functionally recall 3/3 sternal precautions within 7 days in preparation for LVAD implantation. 6.  Patient will perform a mock power exchange for power module to/from battery with supervision/set-up within 7 days in preparation for LVAD implantation. 1.  Patient will move from supine to sit and sit to supine, scoot up and down and roll side to side in bed with independence within 7 days. 2.  Patient will perform sit to/from stand with supervision/set-up within 7 days. 3.  Patient will ambulate 150 feet with least restrictive assistive device and supervision/set-up within 7 days.    4.  Patient will ascend/descend 4 stairs with  handrail(s) with supervision/set-up within 7 days for functional strengthening and community reintegration. .  5.  Patient will verbally and functionally recall 3/3 sternal precautions within 7 days in preparation for LVAD implantation. 6.  Patient will perform a mock power exchange for power module to/from battery with supervision/set-up within 7 days in preparation for LVAD implantation. Outcome: Progressing Towards Goal    PHYSICAL THERAPY TREATMENT  Patient: Reid Baumann (41 y.o. male)  Date: 12/30/2019  Diagnosis: Acute decompensated heart failure ([facility] [address] [I50.9]   <principal problem not specified>  Procedure(s) (LRB):  LEFT BRACHIAL THROMBECTOMY (Left) 35 Days Post-Op  Precautions: Fall, Sternal(LVAD )  Chart, physical therapy assessment, plan of care and goals were reviewed. ASSESSMENT  Patient continues with skilled PT services and is progressing towards goals. Pt limited today to dizziness in standing felt like he is going to \"pass out\" MAP 50 once returned sitting. Pt is motivated and willing to participate. Will continue to progress as able     Current Level of Function Impacting Discharge (mobility/balance): min A x2 sit to stand    Other factors to consider for discharge: decrease  MAP, LVAD,          PLAN :  Patient continues to benefit from skilled intervention to address the above impairments. Continue treatment per established plan of care. to address goals. Recommendation for discharge: (in order for the patient to meet his/her long term goals)  Therapy 3 hours per day 5-7 days per week    This discharge recommendation:  Has not yet been discussed the attending provider and/or case management    IF patient discharges home will need the following DME: to be determined (TBD)       SUBJECTIVE:   Patient stated I need the bed pan.     OBJECTIVE DATA SUMMARY:   Critical Behavior:  Neurologic State: Alert  Orientation Level: Oriented X4  Cognition: Appropriate decision making, Appropriate for age attention/concentration, Appropriate safety awareness, Follows commands  Safety/Judgement: Decreased insight into deficits, Decreased awareness of need for safety, Decreased awareness of need for assistance  Functional Mobility Training:  Bed Mobility:                    Transfers:  Sit to Stand: Minimum assistance;Assist x2  Stand to Sit: Minimum assistance;Assist x2                             Balance:     Ambulation/Gait Training:                                                        Stairs: Therapeutic Exercises:     Pain Rating:  No complaints    Activity Tolerance:   signs and symptoms of orthostatic hypotension  Please refer to the flowsheet for vital signs taken during this treatment.     After treatment patient left in no apparent distress:   Supine in bed and Call bell within reach    COMMUNICATION/COLLABORATION:   The patients plan of care was discussed with: Registered Nurse    Sloane Gary PTA   Time Calculation: 18 mins

## 2019-12-30 NOTE — PROGRESS NOTES
[facility]   [address] [facility], [address], [facility] [place] 2[place]  Phone: [phone]   [phone]       Nephrology Progress Note  Vicente Gordillo     1950     [id number]  Date of Admission : 10/25/2019  12/30/19    CC:  Follow up for JUAN J, CKD, Hyponatremia      Assessment and Plan   JUAN J on CKD:  - resolving and stable  - cont present care    Pseudomonal UTI:  - from cultures on 12/29  - abx per ID    Orthostatic hypotension w/ syncope:  - would consider holding bumex for now and monitor  - will discuss w/ HF team    Hyponatremia :  - Na 128  - start FR  - hold on tolvaptan given severe syncope this am    Gross hematuria, BPH w/ retention:  - off CBI pre VAD. 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 CT in place    L arm clot s/p thrombectomy on 11/25    JOSE on CPAP      PAF s/p DCCV 6/19     Anemia:  - from blood loss s/p multiple blood products  - s/p IV iron,  Repeat iron studies ok  -  IV iron and increased Epogen      Interval History:    Seen and examined. Na 128. Had severe drop in BP. Did not get any tolvaptan overnight. UOP stable. C/o penile pain. No cp or sob.     Review of Systems: as per HPI    Current Medications:   Current Facility-Administered Medications   Medication Dose Route Frequency    warfarin (COUMADIN) tablet 2 mg  2 mg Oral ONCE    bumetanide (BUMEX) injection 1 mg  1 mg IntraVENous BID    phenazopyridine (PYRIDIUM) tablet 100 mg  100 mg Oral BID    ceFAZolin (ANCEF) 1 g in 0.9% sodium chloride (MBP/ADV) 50 mL  1 g IntraVENous Q8H    magnesium oxide (MAG-OX) tablet 800 mg  800 mg Oral BID    albumin human 5% (BUMINATE) solution 12.5 g  12.5 g IntraVENous DAILY    milrinone (PRIMACOR) 20 MG/100 ML D5W infusion  0.2 mcg/kg/min IntraVENous CONTINUOUS    albumin human 5% (BUMINATE) solution 12.5 g  12.5 g IntraVENous DIALYSIS PRN    lidocaine (URO-JET/ GLYDO) 2 % jelly   Urethral PRN    senna-docusate (PERICOLACE) 8.6-50 mg per tablet 1 Tab  1 Tab Oral DAILY    epoetin yvonne-epbx (RETACRIT) injection 20,000 Units  20,000 Units SubCUTAneous Q MON, WED & FRI    levETIRAcetam (KEPPRA) tablet 125 mg  125 mg Oral BID    dronabinol (MARINOL) capsule 2.5 mg  2.5 mg Oral DAILY    polyethylene glycol (MIRALAX) packet 17 g  17 g Oral DAILY    albuterol-ipratropium (DUO-NEB) 2.5 MG-0.5 MG/3 ML  3 mL Nebulization Q6H PRN    therapeutic multivitamin (THERAGRAN) tablet 1 Tab  1 Tab Oral DAILY    escitalopram oxalate (LEXAPRO) tablet 10 mg  10 mg Oral QPM    potassium chloride SR (KLOR-CON 10) tablet 40 mEq  40 mEq Oral DAILY    alteplase (CATHFLO) 1 mg in sterile water (preservative free) 1 mL injection  1 mg InterCATHeter PRN    finasteride (PROSCAR) tablet 5 mg  5 mg Oral DAILY    spironolactone (ALDACTONE) tablet 25 mg  25 mg Oral DAILY    clonazePAM (KlonoPIN) tablet 0.5 mg  0.5 mg Oral TID PRN    sildenafil (pulm.hypertension) (REVATIO) tablet 40 mg  40 mg Oral TID    diphenhydrAMINE (BENADRYL) capsule 25 mg  25 mg Oral QHS PRN    octreotide (SANDOSTATIN) injection 25 mcg  25 mcg SubCUTAneous TID    benzocaine-menthol (CEPACOL) lozenge 1 Lozenge  1 Lozenge Mucous Membrane PRN    digoxin (LANOXIN) tablet 0.0625 mg  62.5 mcg Oral Q MON, WED & FRI    pantoprazole (PROTONIX) tablet 40 mg  40 mg Oral ACB    allopurinol (ZYLOPRIM) tablet 100 mg  100 mg Oral DAILY    arformoterol (BROVANA) neb solution 15 mcg  15 mcg Nebulization BID RT    And    budesonide (PULMICORT) 500 mcg/2 ml nebulizer suspension  500 mcg Nebulization BID RT    artificial saliva (MOUTH KOTE) 1 Spray  1 Spray Oral PRN    lactobac ac& mathew-s.therm-b.anim (TIAN Q/RISAQUAD)  1 Cap Oral DAILY    oxyCODONE IR (ROXICODONE) tablet 5 mg  5 mg Oral Q6H PRN    balsam peru-castor oil (VENELEX) ointment   Topical TID    tamsulosin (FLOMAX) capsule 0.4 mg  0.4 mg Oral DAILY    insulin lispro (HUMALOG) injection   SubCUTAneous AC&HS    Warfarin MD/NP dosing   Other PRN    sodium chloride (NS) flush 5-40 mL  5-40 mL IntraVENous Q8H    sodium chloride (NS) flush 5-40 mL  5-40 mL IntraVENous PRN    acetaminophen (TYLENOL) tablet 650 mg  650 mg Oral Q6H PRN    naloxone (NARCAN) injection 0.4 mg  0.4 mg IntraVENous PRN    ondansetron (ZOFRAN) injection 4 mg  4 mg IntraVENous Q4H PRN    bisacodyl (DULCOLAX) tablet 5 mg  5 mg Oral DAILY PRN    sucralfate (CARAFATE) tablet 1 g  1 g Oral AC&HS    influenza vaccine 2019-20 (6 mos+)(PF) (FLUARIX/FLULAVAL/FLUZONE QUAD) injection 0.5 mL  0.5 mL IntraMUSCular PRIOR TO DISCHARGE    hydrALAZINE (APRESOLINE) 20 mg/mL injection 20 mg  20 mg IntraVENous Q6H PRN    dextrose 10 % infusion 125-250 mL  125-250 mL IntraVENous PRN      No Known Allergies    Objective:  Vitals:    Vitals:    12/30/19 0702 12/30/19 0741 12/30/19 0801 12/30/19 1108   BP:       Pulse:  74 72 89   Resp:  18 18 18   Temp:  98.1 °F (36.7 °C)  98.5 °F (36.9 °C)   SpO2:  98%  99%   Weight: 74.7 kg (164 lb 10.9 oz)      Height:         Intake and Output:  12/30 0701 - 12/30 1900  In: 762.2 [P.O.:480; I.V.:282.2]  Out: 575 [Urine:575]  12/28 1901 - 12/30 0700  In: 1630 [P.O.:1100; I.V.:530]  Out: 2425 [Urine:2425]    Physical Examination:    General: Elderly man in no acute distress  Neck:  No lines   Resp:  TA  CV:  VAD sounds;   No LE edema  GI:  Soft and non-tender; no distension  Neurologic:  Alert and appropriate; normal speech  :  No Lizama    [x]    High complexity decision making was performed  [x]    Patient is at high-risk of decompensation with multiple organ involvement    Lab Data Personally Reviewed: I have reviewed all the pertinent labs, microbiology data and radiology studies during assessment.     Recent Labs     12/30/19  0415 12/29/19  0501 12/28/19  0501   * 129* 128*   K 4.1 4.0 4.5   CL 93* 94* 96*   CO2 29 31 29   * 92 99   BUN 15 16 20   CREA 1.30 1.24 1.28   CA 9.0 8.9 8.6   MG 1.9 1.7 1.7   ALB 2.7* 2.6* 2.4*   SGOT 23 21 28   ALT 18 19 18   INR 1.9* 1.9* 2.2*     Recent Labs     12/30/19  0415 12/29/19  1815 12/29/19  0501 12/28/19  1814 12/28/19  0501   WBC 6.3  --  5.2  --  6.2   HGB 7.5* 8.4* 8.6* 8.7* 8.5*   HCT 23.9* 27.2* 27.2* 27.7* 27.5*     --  159  --  163     No results found for: SDES  Lab Results   Component Value Date/Time    Culture result: PSEUDOMONAS SPECIES (PREDOMINATING) (A) 12/29/2019 03:40 PM    Culture result: (A) 12/29/2019 03:40 PM     LACTOSE FERMENTING GRAM NEGATIVE RODS (2,000 COLONIES/mL)    Culture result: NO GROWTH 4 DAYS 12/26/2019 10:23 AM    Culture result: NO GROWTH 5 DAYS 12/15/2019 03:37 PM    Culture result: NO GROWTH 1 DAY 12/15/2019 03:30 PM     Recent Results (from the past 24 hour(s))   URINALYSIS W/ REFLEX CULTURE    Collection Time: 12/29/19  3:40 PM   Result Value Ref Range    Color YELLOW/STRAW      Appearance TURBID (A) CLEAR      Specific gravity 1.006 1.003 - 1.030      pH (UA) 8.0 5.0 - 8.0      Protein 30 (A) NEG mg/dL    Glucose NEGATIVE  NEG mg/dL    Ketone NEGATIVE  NEG mg/dL    Bilirubin NEGATIVE  NEG      Blood MODERATE (A) NEG      Urobilinogen 0.2 0.2 - 1.0 EU/dL    Nitrites POSITIVE (A) NEG      Leukocyte Esterase LARGE (A) NEG      WBC >100 (H) 0 - 4 /hpf    RBC 5-10 0 - 5 /hpf    Epithelial cells FEW FEW /lpf    Bacteria 3+ (A) NEG /hpf    UA:UC IF INDICATED URINE CULTURE ORDERED (A) CNI     CULTURE, URINE    Collection Time: 12/29/19  3:40 PM   Result Value Ref Range    Special Requests: NO SPECIAL REQUESTS  Reflexed from [id number]        Etoile Count >100,000  COLONIES/mL        Culture result: PSEUDOMONAS SPECIES (PREDOMINATING) (A)      Culture result: (A)       LACTOSE FERMENTING GRAM NEGATIVE RODS (2,000 COLONIES/mL)   SED RATE (ESR)    Collection Time: 12/29/19  6:15 PM   Result Value Ref Range    Sed rate, automated 63 (H) 0 - 20 mm/hr   CORTISOL    Collection Time: 12/29/19  6:15 PM   Result Value Ref Range    Cortisol, random 12.9 ug/dL   HGB & HCT    Collection Time: 12/29/19  6:15 PM   Result Value Ref Range    HGB 8.4 (L) 12.1 - 17.0 g/dL    HCT 27.2 (L) 36.6 - 50.3 %   GLUCOSE, POC    Collection Time: 12/29/19  6:30 PM   Result Value Ref Range    Glucose (POC) 118 (H) 65 - 100 mg/dL    Performed by Blanka Self    GLUCOSE, POC    Collection Time: 12/29/19 10:41 PM   Result Value Ref Range    Glucose (POC) 150 (H) 65 - 100 mg/dL    Performed by Jeremy Berger, COMPREHENSIVE    Collection Time: 12/30/19  4:15 AM   Result Value Ref Range    Sodium 128 (L) 136 - 145 mmol/L    Potassium 4.1 3.5 - 5.1 mmol/L    Chloride 93 (L) 97 - 108 mmol/L    CO2 29 21 - 32 mmol/L    Anion gap 6 5 - 15 mmol/L    Glucose 103 (H) 65 - 100 mg/dL    BUN 15 6 - 20 MG/DL    Creatinine 1.30 0.70 - 1.30 MG/DL    BUN/Creatinine ratio 12 12 - 20      GFR est AA >60 >60 ml/min/1.73m2    GFR est non-AA 55 (L) >60 ml/min/1.73m2    Calcium 9.0 8.5 - 10.1 MG/DL    Bilirubin, total 0.8 0.2 - 1.0 MG/DL    ALT (SGPT) 18 12 - 78 U/L    AST (SGOT) 23 15 - 37 U/L    Alk.  phosphatase 111 45 - 117 U/L    Protein, total 6.8 6.4 - 8.2 g/dL    Albumin 2.7 (L) 3.5 - 5.0 g/dL    Globulin 4.1 (H) 2.0 - 4.0 g/dL    A-G Ratio 0.7 (L) 1.1 - 2.2     MAGNESIUM    Collection Time: 12/30/19  4:15 AM   Result Value Ref Range    Magnesium 1.9 1.6 - 2.4 mg/dL   CBC W/O DIFF    Collection Time: 12/30/19  4:15 AM   Result Value Ref Range    WBC 6.3 4.1 - 11.1 K/uL    RBC 2.49 (L) 4.10 - 5.70 M/uL    HGB 7.5 (L) 12.1 - 17.0 g/dL    HCT 23.9 (L) 36.6 - 50.3 %    MCV 96.0 80.0 - 99.0 FL    MCH 30.1 26.0 - 34.0 PG    MCHC 31.4 30.0 - 36.5 g/dL    RDW 18.3 (H) 11.5 - 14.5 %    PLATELET 738 150 - 400 K/uL    MPV 9.3 8.9 - 12.9 FL    NRBC 0.0 0  WBC    ABSOLUTE NRBC 0.00 0.00 - 0.01 K/uL   PROTHROMBIN TIME + INR    Collection Time: 12/30/19  4:15 AM   Result Value Ref Range    INR 1.9 (H) 0.9 - 1.1      Prothrombin time 19.0 (H) 9.0 - 11.1 sec   PTT    Collection Time: 12/30/19  4:15 AM   Result Value Ref Range    aPTT 40.0 (H) 22.1 - 32.0 sec    aPTT, therapeutic range     58.0 - 77.0 SECS   LD    Collection Time: 12/30/19  4:15 AM   Result Value Ref Range     85 - 241 U/L   NT-PRO BNP    Collection Time: 12/30/19  4:15 AM   Result Value Ref Range    NT pro-BNP 3,792 (H) <125 PG/ML   DIGOXIN    Collection Time: 12/30/19  4:24 AM   Result Value Ref Range    Digoxin level 0.7 (L) 0.90 - 2.00 NG/ML   LACTIC ACID    Collection Time: 12/30/19  4:24 AM   Result Value Ref Range    Lactic acid 0.8 0.4 - 2.0 MMOL/L   PROCALCITONIN    Collection Time: 12/30/19  4:24 AM   Result Value Ref Range    Procalcitonin 0.13 ng/mL   GLUCOSE, POC    Collection Time: 12/30/19  6:18 AM   Result Value Ref Range    Glucose (POC) 108 (H) 65 - 100 mg/dL    Performed by Sally Thibodaux, POC    Collection Time: 12/30/19 11:07 AM   Result Value Ref Range    Glucose (POC) 164 (H) 65 - 100 mg/dL    Performed by Lin Mills MD

## 2019-12-30 NOTE — PROGRESS NOTES
0730: PRECEPTOR REVIEW OF RN ORIENTEE'S WORK:    12/30/2019    Shift times- 0730 to 2000    The RN orientee's documentation of patient care for Ciro Bradford has been reviewed and approved. All medications have been administered under the direct supervision of the preceptor. Arabella Copeland

## 2019-12-31 NOTE — PROGRESS NOTES
[facility] in [place], [place]  Inpatient Progress Note      Patient name: Lonnell Schwab  Patient : 1950  Patient MRN: [id number]  Attending MD: Lynda Boyd MD  Date of service: 19    Chief Complaint:   Shanna Abts azucena LVAD implant     HPI: Sona Kiser is a 71y.o. year old pleasant white male with a history of HTN, HLD, JOSE, CAD s/p cardiac arrest VFib s/p CABG () c/b sternal would infection and sternectomy, ischemic cardiomyopathy LVEF 15-20%, s/p ICD and with LBBB. He was admitted with acute on chronic systolic heart failure with massive volume overload > 20 lbs, in the setting of atrial fibrillation s/p failed DCCV and underwent DCCV and RHC on .  S/p BiVICD on 19 with Dr. Sandeep Mata. He was discharged home home on IV milrinone on 19. [facility] has been followed closely by Dr. Jimmy Cunningham and the [facility].     Mr. Kiser was admitted for acute on chronic systolic heart failure. He underwent implantation of Impella 5.0 due to CS and has completed his LVAD evaluation. [facility] meets criteria for implant of HM3 as DT. He was NYHA Class IV prior to implant of Impella 5.0, has LVEF < 15%, was intolerant of GDMT due to symptomatic hypotension and renal dysfunction. He remains dependent on temporary MCS support. RHC  revealed compensated hemodynamics on Impella. His renal function has improved dramatically with improvement in his hemodynamics. He is not a suitable transplant candidate due to single kidney, sternectomy, debility, and frailty. He was reviewed by [address] [place] [address] Drive and felt to be a high risk heart transplant candidate due to multiple co-morbidities as well as the afore mentioned conditions.  He remained in the CCU and underwent a HM 3 implant as DT on .   He was weaned off of pressors and transferred to [place] [address] where he continues to undergo PT/OT and hemodynamic optimization.       24Hr Events  Remains orthostatic/dizzy  CardioMems PAD 11 yesterday    Procedure:  Procedure(s):  REMOVAL TEMPORARY LEFT VENTRICULAR ASSIST DEVICE, IMPLANTATION OF LEFT VENTRICULAR ASSIST DEVICE PERMANENT (VAD), ECC, JACQUE, EPI AORTIC US BY DR Reid Sellers.     POD:  42     Impression / Plan:   Heart Failure Status: NYHA Class IV    Chronic systolic heart failure due to ICM, NYHA Class IV, EF < 15%, cardiogenic shock bridged with Impella 5.0 support to HM 3 implant   S/p Impella removal and LVAD implant 11/18/19  RPMs 6400 rpm - frequent PI events  TTE with bubble study negative for PFO   Decrease milrinone back to 0.125 mcg/kg/min-for syncopal   Obtain CardioMEMS readings daily- PAD 11 yesterday  Hold diuretics today  Encourage PO fluids  Decrease Sildenafil to 10 mg PO TID due to syncope - rx sent to local pharmacy to initiate PA   Intolerant of BB due to RV failure  Intolerant of ACEi/ARB/ARNI/AA due to JUAN J on CKD 3  Strict I/O, daily weights, Na+ restricted diet   Continue LVAD education   Dressing change frequency three times weekly, Sutures removed 12/26/19  Delayed skin healing on bottom portion of sternotomy- evaluated by CTS, no intervention   TTE 12/16- EF 15-20%    Generalized myoclonus   Improved   Appreciate Neurology input  Decreased Keppra due to somnolence - 125 mg po BID   Head CT negative for acute process  EEG suggestive of mild generalized encephalopathic process, possibly related to underlying structural brain injury vs metabolic abnormality  Monitor closely      Anticoagulation for LVAD, INR goal 1.5-2  Goal decreased d/t persistent hematuria   No ASA d/t hematuria  INR 2.1  Coumadin - 1mg tonight    Epistaxis  Resolved      Acute Respiratory Failure post op  Improved with aggressive diuresis  Off supplemental O2  Pulmonary hygiene   Cont home CPAP- must use while sleeping   Schedule PET CT prior to discharge    Acute on CKD 3, atrophic left kidney  Appreciate Nephrology assistance  Watch Cr - improving   Hold diuretics for positive orthostatics   Albumin today Avoid nephrotoxins, trend labs   Renally dose meds      CAD s/p CABG   Off ASA d/t hematuria  No BB d/t RV failure  Hold statin due to recent hepatic failure   LHC performed 11/13 - low likelihood of benefit from revascularization      Hx of VF arrest s/p BiV-ICD  No recent shocks  Keep K > 4 and Mg > 2   Mag ox to 800mg po BID    PAF   Dig level 1.0 -cont dig (62.5 mcg qMWF)  No BB due to RV failure   Repeat TFTs WNL     Hx of gout  Uric acid WNL    Acute blood loss anemia  Likely due to hematuria  Cont octreotide 25 mcg SQ TID   Fecal occult 12/14 negative, positive on 12/17  Appreciate urology recommendations  Hematuria improved  Monitor for now  Will d/w Urology about bladder spasms      Suspected aspiration pneumonia  Sputum culture showing scant growth of yeast  Cefepime complete    Urinary retention, c/b serratia UTI and hematuria  Appreciate Urology consult   Repeat UA with cx negative 12/15   Cystoscopy performed 11/13 shows catheter induced cystitis   Pseudomonas aeruginosa positive UA culture (12/31/19) - on Cefepmine    Sepsis Alert  Paired Blood Neg  Repeat paired cultures- pending   Sputum cx when able   ESR elevated to 63    Malnutrition   Appreciate Nutritionist consult  Prealbumin weekly - 20.1 on 12/23   Diet as tolerated  Intolerant of mirtazapine d/t tremors, confusion   Decreased Marinol to 2.5 mg PO qPM d/t lethargy  Cont MVI     COPD   Appreciate Pulmonology assistance   Continue nebs PRN       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  No additional treatment at this time     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  Etiology unclear  Continue [facility]  Appreciate neurology assistance      Hx of sternal wound infection, sternectomy  Sternum closed post op- monitor      Vocal cord paralysis  Improved  ENT recs appreciated  Neck CT- R neck edema, no airway compromise   Speech therapy following - appreciate input    Anxiety  Klonipin 0.5 mg TID prn anxiety    Depression  Cont lexapro 10 mg qpm  Monitor QTc - 478 ms on 12/22 Monitor sodium      Debility  Continue PT/OT     Bladder spasms  Received pyridium 100mg x4 doses  Oxybutynin 5mg Q6hr prn      Ppx  Protonix   PT/OT   Bowel regimen   PICC placed 11/22/19      Dispo: Will need IP rehab. Not ready for discharge at this time        LVAD INTERROGATION:  Device interrogated in person  Device function normal, normal flow, multiple PI events    LVAD   Pump Speed (RPM): 6450  Pump Flow (LPM): 5.9  MAP: 76  PI (Pulsitility Index): 3.6  Power: 5.7   Test: Yes  Back Up  at Bedside & Labeled: Yes  Power Module Test: Yes  Driveline Site Care: No  Driveline Dressing: Clean, Dry, and Intact  Outpatient: No  MAP in Therapeutic Range (Outpatient): Yes  Testing  Alarms Reviewed: Yes  Back up SC speed: 6400  Back up Low Speed Limit: 6000  Emergency Equipment with Patient?: Yes  Emergency procedures reviewed?: Yes  Drive line site inspected?: Yes  Drive line intergrity inspected?: Yes  Drive line dressing changed?: No    PHYSICAL EXAM:  Visit Vitals  BP 91/72 (BP 1 Location: Left arm, BP Patient Position: At rest)   Pulse 84   Temp 98.3 °F (36.8 °C)   Resp 16   Ht 6' 2\" (1.88 m)   Wt 163 lb 2.3 oz (74 kg)   SpO2 98%   BMI 20.95 kg/m²       Physical Exam   Constitutional: He is oriented to person, place, and time. He appears well-developed. He appears cachectic. No distress. HENT:   Head: Normocephalic. Neck: Normal range of motion. Neck supple. No JVD present. Cardiovascular: Normal rate, regular rhythm and normal heart sounds. + VAD hum    Pulmonary/Chest: Effort normal and breath sounds normal. No respiratory distress. Abdominal: Soft. Bowel sounds are normal. He exhibits no distension. Musculoskeletal: Normal range of motion. General: No edema. Neurological: He is alert and oriented to person, place, and time. Skin: Skin is warm and dry. Psychiatric: He has a normal mood and affect.  His behavior is normal.   Nursing note and vitals reviewed. REVIEW OF SYSTEMS:  Review of Systems   Constitutional: Positive for malaise/fatigue. Negative for chills and fever. HENT: Positive for hearing loss. Negative for nosebleeds. Respiratory: Negative for cough and shortness of breath. Mild dyspnea   Cardiovascular: Negative for chest pain, palpitations, orthopnea and leg swelling. Gastrointestinal: Negative for heartburn, nausea and vomiting. Genitourinary: Positive for dysuria. Negative for hematuria. Bladder spasms    Musculoskeletal: Negative. Neurological: Positive for dizziness and weakness. Negative for headaches. Endo/Heme/Allergies: Does not bruise/bleed easily.        PAST MEDICAL HISTORY:  Past Medical History:   Diagnosis Date    Degenerative disc disease, lumbar     Heart failure ([facility]     High cholesterol     Hypertension     Paroxysmal atrial fibrillation ([facility] 4/2/2019    Spinal stenosis        PAST SURGICAL HISTORY:  Past Surgical History:   Procedure Laterality Date    COLONOSCOPY Left 11/11/2019    COLONOSCOPY at bedside performed by [facility]gs [facility] at [facility] ENDOSCOPY    HX APPENDECTOMY      HX CORONARY ARTERY BYPASS GRAFT      triple    HX HERNIA REPAIR      HX IMPLANTABLE CARDIOVERTER DEFIBRILLATOR      [place] CARDIOVERSION ELECTIVE ARRHYTHMIA EXTERNAL N/A 6/10/2019    EP CARDIOVERSION performed by Franklyn Lynch MD at [place] [address], [address] CATH LAB    [place] CARDIOVERSION ELECTIVE ARRHYTHMIA EXTERNAL N/A 6/18/2019    EP CARDIOVERSION performed by Chirag Duvall MD at [place] [address], [address] CATH LAB    [place] INSJ/RPLCMT PERM DFB W/TRNSVNS LDS 1/DUAL CHMBR N/A 6/21/2019    INSERT ICD BIV MULTI performed by Ruth Emmanuel MD at [place] [address], [address] CATH LAB    [place] TCAT IMPL WRLS P-ART PRS SNR L-T HEMODYN MNTR N/A 9/18/2019    IMPLANT HEART FAILURE MONITORING DEVICE performed by Mounika Bernard MD at [place] [address], [address] CATH LAB       FAMILY HISTORY:  Family History   Problem Relation Age of Onset    Lung Disease Mother    [facility] Hypertension Mother     Arthritis-osteo Mother     Heart Disease Mother     Heart Disease Father     Diabetes Father        SOCIAL HISTORY:  Social History     Socioeconomic History    Marital status:      Spouse name: Not on file    Number of children: Not on file    Years of education: Not on file    Highest education level: Not on file   Tobacco Use    Smoking status: Former Smoker     Last attempt to quit: 2010     Years since quittin.0    Smokeless tobacco: Never Used   Substance and Sexual Activity    Alcohol use: Not Currently     Comment: rarely    Drug use: Never   Other Topics Concern       LABORATORY RESULTS:     Labs Latest Ref Rng & Units 2019   WBC 4.1 - 11.1 K/uL 8.4 - 6.3 - 5.2 - 6.2   RBC 4.10 - 5.70 M/uL 2.86(L) - 2.49(L) - 2.85(L) - 2.91(L)   Hemoglobin 12.1 - 17.0 g/dL 8.4(L) 8. 3(L) 7. 5(L) 8.4(L) 8.6(L) 8.7(L) 8.5(L)   Hematocrit 36.6 - 50.3 % 27. 2(L) 26. 9(L) 23. 9(L) 27. 2(L) 27. 2(L) 27. 7(L) 27. 5(L)   MCV 80.0 - 99.0 FL 95.1 - 96.0 - 95.4 - 94.5   Platelets 424 - 139 K/uL 140(L) - 179 - 159 - 163   Lymphocytes 12 - 49 % - - - - - - -   Monocytes 5 - 13 % - - - - - - -   Eosinophils 0 - 7 % - - - - - - -   Basophils 0 - 1 % - - - - - - -   Albumin 3.5 - 5.0 g/dL 2. 8(L) - 2. 7(L) - 2. 6(L) - 2. 4(L)   Calcium 8.5 - 10.1 MG/DL 8.8 - 9.0 - 8.9 - 8.6   SGOT 15 - 37 U/L 21 - 23 - 21 - 28   Glucose 65 - 100 mg/dL 141(H) - 103(H) - 92 - 99   BUN 6 - 20 MG/DL 20 - 15 - 16 - 20   Creatinine 0.70 - 1.30 MG/DL 1.59(H) - 1.30 - 1.24 - 1.28   Sodium 136 - 145 mmol/L 130(L) - 128(L) - 129(L) - 128(L)   Potassium 3.5 - 5.1 mmol/L 4.4 - 4.1 - 4.0 - 4.5   TSH 0.36 - 3.74 uIU/mL - - - - - - -   LDH 85 - 241 U/L 189 - 228 - 190 - 249(H)   CEA ng/mL - - - - - - -   Some recent data might be hidden     Lab Results   Component Value Date/Time    TSH 2.30 2019 03:29 AM    TSH 2.40 10/25/2019 07:39 PM    TSH 2.45 06/01/2019 04:16 AM       ALLERGY:  No Known Allergies     CURRENT MEDICATIONS:  Current Facility-Administered Medications   Medication Dose Route Frequency    sildenafil (pulm.hypertension) (REVATIO) tablet 10 mg  10 mg Oral TID    oxybutynin (DITROPAN) tablet 5 mg  5 mg Oral Q6H PRN    warfarin (COUMADIN) tablet 1 mg  1 mg Oral ONCE    cefepime (MAXIPIME) 2 g in 0.9% sodium chloride (MBP/ADV) 100 mL  2 g IntraVENous Q24H    [Held by provider] bumetanide (BUMEX) injection 1 mg  1 mg IntraVENous BID    magnesium oxide (MAG-OX) tablet 800 mg  800 mg Oral BID    milrinone (PRIMACOR) 20 MG/100 ML D5W infusion  0.125 mcg/kg/min IntraVENous CONTINUOUS    albumin human 5% (BUMINATE) solution 12.5 g  12.5 g IntraVENous DIALYSIS PRN    lidocaine (URO-JET/ GLYDO) 2 % jelly   Urethral PRN    senna-docusate (PERICOLACE) 8.6-50 mg per tablet 1 Tab  1 Tab Oral DAILY    epoetin yvonne-epbx (RETACRIT) injection 20,000 Units  20,000 Units SubCUTAneous Q MON, WED & FRI    levETIRAcetam (KEPPRA) tablet 125 mg  125 mg Oral BID    dronabinol (MARINOL) capsule 2.5 mg  2.5 mg Oral DAILY    polyethylene glycol (MIRALAX) packet 17 g  17 g Oral DAILY    albuterol-ipratropium (DUO-NEB) 2.5 MG-0.5 MG/3 ML  3 mL Nebulization Q6H PRN    therapeutic multivitamin (THERAGRAN) tablet 1 Tab  1 Tab Oral DAILY    escitalopram oxalate (LEXAPRO) tablet 10 mg  10 mg Oral QPM    potassium chloride SR (KLOR-CON 10) tablet 40 mEq  40 mEq Oral DAILY    alteplase (CATHFLO) 1 mg in sterile water (preservative free) 1 mL injection  1 mg InterCATHeter PRN    finasteride (PROSCAR) tablet 5 mg  5 mg Oral DAILY    spironolactone (ALDACTONE) tablet 25 mg  25 mg Oral DAILY    clonazePAM (KlonoPIN) tablet 0.5 mg  0.5 mg Oral TID PRN    diphenhydrAMINE (BENADRYL) capsule 25 mg  25 mg Oral QHS PRN    octreotide (SANDOSTATIN) injection 25 mcg  25 mcg SubCUTAneous TID    benzocaine-menthol (CEPACOL) lozenge 1 Lozenge  1 Lozenge Mucous Membrane PRN  digoxin (LANOXIN) tablet 0.0625 mg  62.5 mcg Oral Q MON, WED & FRI    pantoprazole (PROTONIX) tablet 40 mg  40 mg Oral ACB    allopurinol (ZYLOPRIM) tablet 100 mg  100 mg Oral DAILY    arformoterol (BROVANA) neb solution 15 mcg  15 mcg Nebulization BID RT    And    budesonide (PULMICORT) 500 mcg/2 ml nebulizer suspension  500 mcg Nebulization BID RT    artificial saliva (MOUTH KOTE) 1 Spray  1 Spray Oral PRN    lactobac ac& pc-s.therm-b.anim (TIAN Q/RISAQUAD)  1 Cap Oral DAILY    oxyCODONE IR (ROXICODONE) tablet 5 mg  5 mg Oral Q6H PRN    balsam peru-castor oil (VENELEX) ointment   Topical TID    tamsulosin (FLOMAX) capsule 0.4 mg  0.4 mg Oral DAILY    insulin lispro (HUMALOG) injection   SubCUTAneous AC&HS    Warfarin MD/NP dosing   Other PRN    sodium chloride (NS) flush 5-40 mL  5-40 mL IntraVENous Q8H    sodium chloride (NS) flush 5-40 mL  5-40 mL IntraVENous PRN    acetaminophen (TYLENOL) tablet 650 mg  650 mg Oral Q6H PRN    naloxone (NARCAN) injection 0.4 mg  0.4 mg IntraVENous PRN    ondansetron (ZOFRAN) injection 4 mg  4 mg IntraVENous Q4H PRN    bisacodyl (DULCOLAX) tablet 5 mg  5 mg Oral DAILY PRN    sucralfate (CARAFATE) tablet 1 g  1 g Oral AC&HS    influenza vaccine 2019-20 (6 mos+)(PF) (FLUARIX/FLULAVAL/FLUZONE QUAD) injection 0.5 mL  0.5 mL IntraMUSCular PRIOR TO DISCHARGE    hydrALAZINE (APRESOLINE) 20 mg/mL injection 20 mg  20 mg IntraVENous Q6H PRN    dextrose 10 % infusion 125-250 mL  125-250 mL IntraVENous PRN         Thank you for allowing me to participate in this patient's care. Mark Hermosillo NP  [address] [place] [address], [place] [facility] [place]  Phone: [phone]  Fax: [phone]    [facility] ATTENDING ADDENDUM    Patient was seen and examined in person. Data and notes were reviewed. I have discussed and agree with the plan as noted in the NP note above without further additions.     Ardyth Bernheim, MD PhD  [address]

## 2019-12-31 NOTE — PROGRESS NOTES
0730:  Bedside shift change report given to [address], RN (oncoming nurse) by My Kuo RN (offgoing nurse). Report included the following information SBAR, Kardex, Procedure Summary, Intake/Output, MAR, and Recent Results. 0800:  Patient alert and oriented, sitting up in bed for breakfast.     1051:  Patient resting quietly. 1130:  Patient's wife at bedside. Patient c/o bladder discomfort, PRN ditropan given. PT/OT in to work with patient. 1155:  Patient orthostatic with PT/OT. Maria L Morales NP made aware and received orders to push PO fluids. 1330:  Patient experiencing chills and anxiety, no fever present, warm blanket provided, tylenol and klonopin given. 1430:  Patient resting comfortably. 1515:  Patient c/o SOB, nausea, MAP 58. Maria L Morales made aware and received orders for paired blood cultures, sputum culture, and to give another bottle of albumin. 1535:  Blood cultures drawn and sent to lab.    1615:  Sputum culture obtained and sent to lab. Patient appears calm and states \"I feel better and like I can breathe\". Patient's fever reduced to 98.8 and MAP now 72. Charge RN clarified with Maru Bobby NP about albumin orders - hold orders for third bottle of albumin. 1630:  RN redrew H&H via peripheral stick. 1815:  Patient sitting up in bed eating dinner, states \"I feel so much better than yesterday\". 1930:  Bedside shift change report given to Matthew Moran RN (oncoming nurse) by [address], RN (offgoing nurse). Report included the following information SBAR, Kardex, Procedure Summary, Intake/Output, MAR and Recent Results. Problem: Falls - Risk of  Goal: *Absence of Falls  Description  Document Donato Disla Fall Risk and appropriate interventions in the flowsheet.   Outcome: Progressing Towards Goal  Note: Fall Risk Interventions:  Mobility Interventions: Patient to call before getting OOB    Mentation Interventions: Adequate sleep, hydration, pain control, Door open when patient unattended, Increase mobility, More frequent rounding, Reorient patient, Room close to nurse's station, Toileting rounds, Update white board    Medication Interventions: Patient to call before getting OOB, Utilize gait belt for transfers/ambulation    Elimination Interventions: Call light in reach, Patient to call for help with toileting needs    History of Falls Interventions: Consult care management for discharge planning, Door open when patient unattended, Evaluate medications/consider consulting pharmacy, Investigate reason for fall, Room close to nurse's station, Utilize gait belt for transfer/ambulation, Assess for delayed presentation/identification of injury for 48 hrs (comment for end date)         Problem: Pain  Goal: *Control of Pain  Outcome: Progressing Towards Goal     Problem: Pressure Injury - Risk of  Goal: *Prevention of pressure injury  Description  Document Mich Scale and appropriate interventions in the flowsheet. Outcome: Progressing Towards Goal  Note: Pressure Injury Interventions:  Sensory Interventions: Assess changes in LOC, Assess need for specialty bed, Float heels, Keep linens dry and wrinkle-free, Pressure redistribution bed/mattress (bed type), Turn and reposition approx.  every two hours (pillows and wedges if needed)    Moisture Interventions: Absorbent underpads    Activity Interventions: Increase time out of bed, Pressure redistribution bed/mattress(bed type), PT/OT evaluation    Mobility Interventions: Float heels, HOB 30 degrees or less, Pressure redistribution bed/mattress (bed type)    Nutrition Interventions: Document food/fluid/supplement intake, Offer support with meals,snacks and hydration, Discuss nutritional consult with provider    Friction and Shear Interventions: Apply protective barrier, creams and emollients, Feet elevated on foot rest, Foam dressings/transparent film/skin sealants, HOB 30 degrees or less, Lift sheet, Lift team/patient mobility team, Minimize layers, Sit at 90-degree angle                Problem: Heart Failure: Discharge Outcomes  Goal: *Demonstrates ability to perform prescribed activity without shortness of breath or discomfort  Outcome: Progressing Towards Goal     Problem: LVAD Post-op Day 15 to Discharge  Goal: Activity/Safety  Outcome: Progressing Towards Goal     Problem: Impaired Skin Integrity/Pressure Injury Treatment  Goal: *Improvement of Existing Pressure Injury  Outcome: Progressing Towards Goal

## 2019-12-31 NOTE — NURSE NAVIGATOR
HF NNs attempted to obtain CARDIOMEMS reading. With multiple attempts at repositioning patient on wand, unable to get adequate signal.  Patient became uncomfortable with all the moving around, so reading attempts were stopped. Was able to get 2 readings to transmit; however the secure website read both as SUSPECT. Waveforms were poor and pulse pressure was very narrow indicating inadequate reading. HF NN changed both of these readings to IGNORE.

## 2019-12-31 NOTE — PROGRESS NOTES
ID Progress Note  2019    Subjective:     Doing ok, seems to be feeling stronger    Objective:     Antibiotics:  1. Levaquin  2. Rifampin   3. Fluconazole  4. Vancomycin    5. Cefazolin   6. Zosyn       Vitals:   Visit Vitals  BP 91/72 (BP 1 Location: Left arm, BP Patient Position: At rest)   Pulse 84   Temp 98.3 °F (36.8 °C)   Resp 16   Ht 6' 2\" (1.88 m)   Wt 74 kg (163 lb 2.3 oz)   SpO2 98%   BMI 20.95 kg/m²        Tmax:  Temp (24hrs), Av.1 °F (37.3 °C), Min:97.8 °F (36.6 °C), Max:100.4 °F (38 °C)      Exam:  Lungs:  clear to auscultation bilaterally  Heart:  regular rate and rhythm  Abdomen:  soft, non-tender. Bowel sounds normal. No masses,  no organomegaly      Labs:      Recent Labs     19  0323 19  1557 19  0415 19  1815 19  0501   WBC 8.4  --  6.3  --  5.2   HGB 8.4* 8.3* 7.5* 8.4* 8.6*   *  --  179  --  159   BUN 20  --  15  --  16   CREA 1.59*  --  1.30  --  1.24   SGOT 21  --  23  --  21     --  111  --  105   TBILI 1.0  --  0.8  --  0.7       Cultures:     No results found for: [facility]  Lab Results   Component Value Date/Time    Culture result: PSEUDOMONAS AERUGINOSA (A) 2019 03:40 PM    Culture result: (A) 2019 03:40 PM     LACTOSE FERMENTING GRAM NEGATIVE RODS (2,000 COLONIES/mL)    Culture result: NO GROWTH 5 DAYS 2019 10:23 AM       Radiology:     Line/Insert Date:           Assessment:     1. UTI--Serratia (2 biotypes) from culture and small amount of Enterococcus--now with Pseudomonas from culture  2. CHF/cardiomyopathy  3. ?aspiration event(s)  4. Renal insufficiency  5. Respiratory difficulties    Objective:     1. Continue current therapy  2.  Group will see tomorrow if asked      Sandy Flores MD

## 2019-12-31 NOTE — PROCEDURES
[address]   PULMONARY FUNCTION TEST    Name:  Rudy Valencia  MR#:  [id number]  :  1950  ACCOUNT #:  [de-identified]  DATE OF SERVICE:  2019      CLINICAL INDICATION:  Shortness of breath. Spirometry is performed. Spirometry reveals severe airflow obstruction. The vital capacity is severely reduced, which may be secondary to air trapping or concurrent restrictive physiology. Lung volumes are recommended if clinically indicated. The flow volume loop is consistent with an obstructive pattern.       MD KATHLEEN [email or web address]  D:  2019 13:15  T:  2019 0:02  JOB #:  [id number]

## 2019-12-31 NOTE — PROGRESS NOTES
Problem: Self Care Deficits Care Plan (Adult)  Goal: *Acute Goals and Plan of Care (Insert Text)  Description    FUNCTIONAL STATUS PRIOR TO ADMISSION: Patient was modified independent using a single point cane for functional mobility. Patient able to shower and dress himself. However, patient required assistance for household management from his wife. HOME SUPPORT: The patient lived alone with wife to provide assistance. Occupational Therapy Goals:    Goals reviewed and continued/modified as follows 12/26/2019  1. Patient will perform upper body dressing moderate assistance within 7 day(s) including management of LVAD. 2.  Patient will perform lower body dressing with minimal assistance within 7 day(s). (able to [place] socks in bed, needs MOD A for dynamic standing balance)  3. Patient will perform grooming seated EOB with supervision/setup within 7 day(s). 4.  Patient will perform toilet transfers with minimum assistance within 7 day(s). -CONTINUE  5. Patient will perform all aspects of toileting with moderate assistance within 7 day(s). -CONTINUE  6. Patient will participate in upper extremity therapeutic exercise/activities with supervision/set-up-min A for 5 minutes within 7 day(s). -GOAL MET (discontinue)  7. Patient will utilize energy conservation techniques during functional activities with verbal cues within 7 day(s). 8. Patient will verbalize 3/5 LVAD components with min verbal cues within 7 day (s). -CONTINUE    Goals reviewed and continued 12/19/2019  OT weekly reassessment 12/6/19: goals modified  1. Patient will perform upper body dressing moderate assistance within 7 day(s). 2.  Patient will perform lower body dressing with moderate assistance within 7 day(s). 3.  Patient will perform grooming seated EOB with minimum assistance within 7 day(s). 4.  Patient will perform toilet transfers with minimum assistance within 7 day(s).   5.  Patient will perform all aspects of toileting with moderate assistance within 7 day(s). 6.  Patient will participate in upper extremity therapeutic exercise/activities with supervision/set-up-min A for 5 minutes within 7 day(s). 7.  Patient will utilize energy conservation techniques during functional activities with verbal cues within 7 day(s). 8. Patient will verbalize 3/5 LVAD components with min verbal cues within 7 day (s). OT weekly reassessment 11/29/19: goals modified below  1. Patient will perform upper body dressing including LVAD switchovers with moderate assistance within 7 day(s). 2.  Patient will perform lower body dressing with minimal assistance within 7 day(s). 3.  Patient will perform grooming in standing with minimum assistance within 7 day(s). 4.  Patient will perform toilet transfers with minimum assistance within 7 day(s). 5.  Patient will perform all aspects of toileting with minimal assistance within 7 day(s). 6.  Patient will participate in upper extremity therapeutic exercise/activities with supervision/set-up for 5 minutes within 7 day(s). 7.  Patient will utilize energy conservation techniques during functional activities with verbal cues within 7 day(s). 8. Patient will verbalize LVAD terminology with verbal cues within 7 day (s). Goals reviewed and continued/modified as follows 11/20/19 s/p LVAD implantation  1. Patient will perform upper body dressing including LVAD switchovers with moderate assistance within 7 day(s). 2.  Patient will perform lower body dressing with minimal assistance within 7 day(s). 3.  Patient will perform grooming with supervision/setup within 7 day(s). 4.  Patient will perform toilet transfers supervision/setupmodified independence within 7 day(s). 5.  Patient will perform all aspects of toileting with minimal assistance within 7 day(s). 6.  Patient will participate in upper extremity therapeutic exercise/activities with supervision/set-up for 5 minutes within 7 day(s).     7. Patient will utilize energy conservation techniques during functional activities with verbal cues within 7 day(s). 8. Patient will verbalize LVAD terminology with verbal cues within 7 day (s). Goals reviewed and continued/modified as follows 11/12/19  1. Patient will perform bathing with supervision/set-up within 7 day(s). 2.  Patient will perform lower body dressing with supervision/set-up within 7 day(s). 3.  Patient will perform grooming with modified independence within 7 day(s). 4.  Patient will perform toilet transfers with modified independence within 7 day(s). 5.  Patient will perform all aspects of toileting with supervision/set-up within 7 day(s). 6.  Patient will participate in upper extremity therapeutic exercise/activities with supervision/set-up for 5 minutes within 7 day(s). 7.  Patient will utilize energy conservation techniques during functional activities with verbal cues within 7 day(s). 8. Patient will verbalize LVAD terminology with verbal cues within 7 day (s) in preparation for implant. Continue all goals 10/30/19 post re-eval for Impella removal   Initiated 10/28/2019  1. Patient will perform bathing with supervision/set-up within 7 day(s). 2.  Patient will perform lower body dressing with supervision/set-up within 7 day(s). 3.  Patient will perform grooming with modified independence within 7 day(s). 4.  Patient will perform toilet transfers with modified independence within 7 day(s). 5.  Patient will perform all aspects of toileting with supervision/set-up within 7 day(s). 6.  Patient will participate in upper extremity therapeutic exercise/activities with supervision/set-up for 5 minutes within 7 day(s). 7.  Patient will utilize energy conservation techniques during functional activities with verbal cues within 7 day(s).                   12/31/2019 1552 by Dewayne Mojica OT  Outcome: Progressing Towards Goal  OCCUPATIONAL THERAPY TREATMENT  Patient: Jorge Gore ARMAND Kiser (75 y.o. male)  Date: 12/31/2019  Diagnosis: Acute decompensated heart failure (HCC) [I50.9]   <principal problem not specified>  Procedure(s) (LRB):  LEFT BRACHIAL THROMBECTOMY (Left) 36 Days Post-Op  Precautions: Fall, Sternal(LVAD )  Chart, occupational therapy assessment, plan of care, and goals were reviewed. ASSESSMENT  Patient continues with skilled OT services and is progressing towards goals. Pt with decreased activity tolerance this date, limited by decreased MAPs with upright activity and c/o dizziness, fatigue and \"feeling like he was going to pass out. \" MAP at rest 74, decreasing to 59 in full bed to chair position where pt was highly symptomatic. Assisted pt back to supine with MAP recovering to 81. Reviewed LVAD terminology with pt having increased difficulty with recall needing prompting, went through naming 6 items again. Pt with <25% accuracy naming. Current Level of Function Impacting Discharge (ADLs): A for all ADLs, limited by bladder cramps, poor activity tolerance, fatigue and dizziness with decreased MAPs with upright activity    Other factors to consider for discharge: LVAD         PLAN :  Patient continues to benefit from skilled intervention to address the above impairments. Continue treatment per established plan of care. to address goals. Recommend with staff: increased HOB, modified bed to chair position    Recommend next OT session: naming LVAD equipment, seated EOB ADLs    Recommendation for discharge: (in order for the patient to meet his/her long term goals)  To be determined: will work towards tolerating 3 hours of therapy     This discharge recommendation:  Has been made in collaboration with the attending provider and/or case management    IF patient discharges home will need the following DME: to be determined (TBD)       SUBJECTIVE:   Patient stated I feel like I could pass out.     OBJECTIVE DATA SUMMARY:   Cognitive/Behavioral Status: Functional Mobility and Transfers for ADLs:  Bed Mobility:   Pt placed in full bed to chair position with pt tolerating <5 minutes    Therapeutic Exercises:   Patient recalled VAD equipment in prep for ADL routine with max assist:   Item Correct Identification Location Function Comments    no   Required verbal cues and prompting for 6/6 items. Reviewed on second trial with poor recall   Battery Clips yes      Power Module no      Battery  no      Drive Line no      Power Leads no            Activity Tolerance:   Poor  Please refer to the flowsheet for vital signs taken during this treatment.     After treatment patient left in no apparent distress:   Supine in bed, Heels elevated for pressure relief, Call bell within reach, Caregiver / family present, and Side rails x 3    COMMUNICATION/COLLABORATION:   The patients plan of care was discussed with: Physical Therapy Assistant and Registered Nurse    Oc Srinivasan OT  Time Calculation: 24 mins

## 2019-12-31 NOTE — PROGRESS NOTES
Problem: Mobility Impaired (Adult and Pediatric)  Goal: *Acute Goals and Plan of Care (Insert Text)  Description  FUNCTIONAL STATUS PRIOR TO ADMISSION: Patient was modified independent using a single point cane for functional mobility. Patient reports an increasingly sedentary lifestyle 2* fatigue and SOB/dyspnea. Retired (so is his wife). Patient reports x 3 falls within the last couple of weeks. Patient is wearing either nasal cannula or CPAP at night. LVAD work-up has been initiated. HOME SUPPORT PRIOR TO ADMISSION: The patient lived with his wife, but did not require physical assistance. Physical Therapy Goals  Reassessed on 12/26/2019, goals not met but remain appropriate, so carry over          Physical Therapy Goals:    Weekly reassessment completed 12/19/2019 and goals downgraded as appropriate. 1.  Patient will move from supine to sit and sit to supine, scoot up and down and roll side to side in bed with contact guard assistance within 7 days. 2.  Patient will perform sit to/from stand with minimal assistance x 1 within 7 days. 3.  Patient will ambulate 25 feet with least restrictive assistive device and moderate assistance within 7 days. 4.  Stair goal to be formulated when appropriate. 5.  Patient will perform cardiac exercises per protocol with supervision within 7 days. 6.  Patient will verbally and functionally recall mindful movement principles (Move in the Tube) with minimal verbal cuing/reminding within 7 days. 7.  Patient will perform power exchange for power module to/from battery with moderate assistance within 7 days. Re-evaluation completed 11/20/2019 and new goals formulated following LVAD implantation. Reviewed and cont 12/3/2019. Reviewed and continued 12/12/2019  1. Patient will move from supine to sit and sit to supine, scoot up and down and roll side to side in bed with minimal assistance/contact guard assist within 7 days.     2.  Patient will perform sit to/from stand with minimal assistance/contact guard assist within 7 days. 3.  Patient will ambulate 150 feet with least restrictive assistive device and minimal assistance/contact guard assist within 7 days. 4.  Patient will ascend/descend 4 stairs with handrail(s) with minimal assistance/contact guard assist within 7 days. 5.  Patient will perform cardiac exercises per protocol with supervision within 7 days. 6.  Patient will verbally and functionally recall 3/3 sternal precautions within 7 days. 7.  Patient will perform power exchange for power module to/from battery with moderate assistance  within 7 days. Initiated 10/27/2019; updated 11/15/2019   1. Patient will move from supine to sit and sit to supine, scoot up and down and roll side to side in bed with independence within 7 days. 2.  Patient will perform sit to/from stand with supervision/set-up within 7 days. 3.  Patient will ambulate 200 feet with least restrictive assistive device and supervision/set-up within 7 days. 4.  Patient will ascend/descend 4 stairs with  handrail(s) with supervision/set-up within 7 days for functional strengthening and community reintegration. .  5.  Patient will verbally and functionally recall 3/3 sternal precautions within 7 days in preparation for LVAD implantation. 6.  Patient will perform a mock power exchange for power module to/from battery with supervision/set-up within 7 days in preparation for LVAD implantation. 1.  Patient will move from supine to sit and sit to supine, scoot up and down and roll side to side in bed with independence within 7 days. 2.  Patient will perform sit to/from stand with supervision/set-up within 7 days. 3.  Patient will ambulate 150 feet with least restrictive assistive device and supervision/set-up within 7 days.    4.  Patient will ascend/descend 4 stairs with  handrail(s) with supervision/set-up within 7 days for functional strengthening and community reintegration. .  5.  Patient will verbally and functionally recall 3/3 sternal precautions within 7 days in preparation for LVAD implantation. 6.  Patient will perform a mock power exchange for power module to/from battery with supervision/set-up within 7 days in preparation for LVAD implantation. Outcome: Progressing Towards Goal    PHYSICAL THERAPY TREATMENT  Patient: Milagro Holley (82 y.o. male)  Date: 12/31/2019  Diagnosis: Acute decompensated heart failure ([facility] [address] [I50.9]   <principal problem not specified>  Procedure(s) (LRB):  LEFT BRACHIAL THROMBECTOMY (Left) 36 Days Post-Op  Precautions: Fall, Sternal(LVAD )  Chart, physical therapy assessment, plan of care and goals were reviewed. ASSESSMENT  Patient continues with skilled PT services and is progressing towards goals. Pt reports not feeling well. Placed pt in chair position and reports increasingly feeling unwell MAP 59. Returned supine and repositioned. RN notified. Current Level of Function Impacting Discharge (mobility/balance): Other factors to consider for discharge:          PLAN :  Patient continues to benefit from skilled intervention to address the above impairments. Continue treatment per established plan of care. to address goals. Recommendation for discharge: (in order for the patient to meet his/her long term goals)  Therapy 3 hours per day 5-7 days per week    This discharge recommendation:  Has been made in collaboration with the attending provider and/or case management    IF patient discharges home will need the following DME: to be determined (TBD)       SUBJECTIVE:   Patient stated I dont feel well .     OBJECTIVE DATA SUMMARY:   Critical Behavior:  Neurologic State: Alert, Appropriate for age  Orientation Level: Oriented to person, Oriented to situation, Disoriented to place, Disoriented to time  Cognition: Decreased attention/concentration, Decreased command following, Impaired decision making, Impulsive, Poor safety awareness, Recognition of people/places  Safety/Judgement: Decreased insight into deficits, Decreased awareness of need for safety, Decreased awareness of need for assistance  Functional Mobility Training:  Bed Mobility:      Rolling SBA  Pt assisted with Scooting to HOB  Supine to sit. Bed controls               Transfers:                                   Balance:     Ambulation/Gait Training:                                                        Stairs: Therapeutic Exercises:     Pain Rating:      Activity Tolerance:   Poor due to MAP  Please refer to the flowsheet for vital signs taken during this treatment.     After treatment patient left in no apparent distress:   Supine in bed and Call bell within reach    COMMUNICATION/COLLABORATION:   The patients plan of care was discussed with: Registered Nurse    Julio Slater PTA   Time Calculation: 19 mins

## 2019-12-31 NOTE — PROGRESS NOTES
[facility]   [address] [facility], [facility] [address], [facility] [place]  Phone: [phone]   [phone]       Nephrology Progress Note  Sarah Bass     1950     [id number]  Date of Admission : 10/25/2019  12/31/19    CC:  Follow up for JUAN J, CKD, Hyponatremia      Assessment and Plan   JUAN J on CKD:  - resolving and stable  - Cr stable  - no changes    Pseudomonal UTI:  - from cultures on 12/29  - abx per ID    Orthostatic hypotension w/ syncope:  - holding diuretics for now    Hyponatremia :  - Na stable at 130  - daily Na levels for now    Gross hematuria, BPH w/ retention:  - off CBI pre VAD. 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 CT in place    L arm clot s/p thrombectomy on 11/25    JOSE on CPAP      PAF s/p DCCV 6/19     Anemia:  - from blood loss s/p multiple blood products  - s/p IV iron,  Repeat iron studies ok  -  IV iron and increased Epogen      Interval History:    Seen and examined. Na up today. cardiomems reading 11 from yesterday. Received IV albumin. Diuretics held.   No cp or sob this AM.    Review of Systems: as per HPI    Current Medications:   Current Facility-Administered Medications   Medication Dose Route Frequency    sildenafil (pulm.hypertension) (REVATIO) tablet 10 mg  10 mg Oral TID    oxybutynin (DITROPAN) tablet 5 mg  5 mg Oral Q6H PRN    warfarin (COUMADIN) tablet 1 mg  1 mg Oral ONCE    cefepime (MAXIPIME) 2 g in 0.9% sodium chloride (MBP/ADV) 100 mL  2 g IntraVENous Q24H    [Held by provider] bumetanide (BUMEX) injection 1 mg  1 mg IntraVENous BID    magnesium oxide (MAG-OX) tablet 800 mg  800 mg Oral BID    milrinone (PRIMACOR) 20 MG/100 ML D5W infusion  0.125 mcg/kg/min IntraVENous CONTINUOUS    albumin human 5% (BUMINATE) solution 12.5 g  12.5 g IntraVENous DIALYSIS PRN    lidocaine (URO-JET/ GLYDO) 2 % jelly   Urethral PRN    senna-docusate (PERICOLACE) 8.6-50 mg per tablet 1 Tab  1 Tab Oral DAILY    epoetin yvonne-epbx (RETACRIT) injection 20,000 Units  20,000 Units SubCUTAneous Q MON, WED & FRI    levETIRAcetam (KEPPRA) tablet 125 mg  125 mg Oral BID    dronabinol (MARINOL) capsule 2.5 mg  2.5 mg Oral DAILY    polyethylene glycol (MIRALAX) packet 17 g  17 g Oral DAILY    albuterol-ipratropium (DUO-NEB) 2.5 MG-0.5 MG/3 ML  3 mL Nebulization Q6H PRN    therapeutic multivitamin (THERAGRAN) tablet 1 Tab  1 Tab Oral DAILY    escitalopram oxalate (LEXAPRO) tablet 10 mg  10 mg Oral QPM    potassium chloride SR (KLOR-CON 10) tablet 40 mEq  40 mEq Oral DAILY    alteplase (CATHFLO) 1 mg in sterile water (preservative free) 1 mL injection  1 mg InterCATHeter PRN    finasteride (PROSCAR) tablet 5 mg  5 mg Oral DAILY    spironolactone (ALDACTONE) tablet 25 mg  25 mg Oral DAILY    clonazePAM (KlonoPIN) tablet 0.5 mg  0.5 mg Oral TID PRN    diphenhydrAMINE (BENADRYL) capsule 25 mg  25 mg Oral QHS PRN    octreotide (SANDOSTATIN) injection 25 mcg  25 mcg SubCUTAneous TID    benzocaine-menthol (CEPACOL) lozenge 1 Lozenge  1 Lozenge Mucous Membrane PRN    digoxin (LANOXIN) tablet 0.0625 mg  62.5 mcg Oral Q MON, WED & FRI    pantoprazole (PROTONIX) tablet 40 mg  40 mg Oral ACB    allopurinol (ZYLOPRIM) tablet 100 mg  100 mg Oral DAILY    arformoterol (BROVANA) neb solution 15 mcg  15 mcg Nebulization BID RT    And    budesonide (PULMICORT) 500 mcg/2 ml nebulizer suspension  500 mcg Nebulization BID RT    artificial saliva (MOUTH KOTE) 1 Spray  1 Spray Oral PRN    lactobac ac& pc-s.therm-b.anim (TIAN Q/RISAQUAD)  1 Cap Oral DAILY    oxyCODONE IR (ROXICODONE) tablet 5 mg  5 mg Oral Q6H PRN    balsam peru-castor oil (VENELEX) ointment   Topical TID    tamsulosin (FLOMAX) capsule 0.4 mg  0.4 mg Oral DAILY    insulin lispro (HUMALOG) injection   SubCUTAneous AC&HS    Warfarin MD/NP dosing   Other PRN    sodium chloride (NS) flush 5-40 mL  5-40 mL IntraVENous Q8H    sodium chloride (NS) flush 5-40 mL  5-40 mL IntraVENous PRN    acetaminophen (TYLENOL) tablet 650 mg  650 mg Oral Q6H PRN    naloxone (NARCAN) injection 0.4 mg  0.4 mg IntraVENous PRN    ondansetron (ZOFRAN) injection 4 mg  4 mg IntraVENous Q4H PRN    bisacodyl (DULCOLAX) tablet 5 mg  5 mg Oral DAILY PRN    sucralfate (CARAFATE) tablet 1 g  1 g Oral AC&HS    influenza vaccine 2019-20 (6 mos+)(PF) (FLUARIX/FLULAVAL/FLUZONE QUAD) injection 0.5 mL  0.5 mL IntraMUSCular PRIOR TO DISCHARGE    hydrALAZINE (APRESOLINE) 20 mg/mL injection 20 mg  20 mg IntraVENous Q6H PRN    dextrose 10 % infusion 125-250 mL  125-250 mL IntraVENous PRN      No Known Allergies    Objective:  Vitals:    Vitals:    12/30/19 2230 12/31/19 0004 12/31/19 0314 12/31/19 0745   BP:       Pulse: 92 93 83 77   Resp:  20 18 18   Temp: 99.9 °F (37.7 °C) 100.1 °F (37.8 °C) 99.7 °F (37.6 °C) 98.3 °F (36.8 °C)   SpO2:  97% 96% 98%   Weight:   74 kg (163 lb 2.3 oz)    Height:   6' 2\" (1.88 m)      Intake and Output:  12/31 0701 - 12/31 1900  In: 472.7 [P.O.:360; I.V.:112.7]  Out: 550 [Urine:550]  12/29 1901 - 12/31 0700  In: 2125.6 [P.O.:1320; I.V.:805.6]  Out: 2250 [Urine:2250]    Physical Examination:    General: Elderly man in no acute distress  Neck:  No lines   Resp:  TA  CV:  VAD sounds;   No LE edema  GI:  Soft and non-tender; no distension  Neurologic:  Alert and appropriate; normal speech  :  No Lizama    [x]    High complexity decision making was performed  [x]    Patient is at high-risk of decompensation with multiple organ involvement    Lab Data Personally Reviewed: I have reviewed all the pertinent labs, microbiology data and radiology studies during assessment.     Recent Labs     12/31/19  0323 12/30/19  0415 12/29/19  0501   * 128* 129*   K 4.4 4.1 4.0   CL 95* 93* 94*   CO2 28 29 31   * 103* 92   BUN 20 15 16   CREA 1.59* 1.30 1.24   CA 8.8 9.0 8.9   MG 1.7 1.9 1.7   ALB 2.8* 2.7* 2.6*   SGOT 21 23 21   ALT 12 18 19   INR 2.1* 1.9* 1.9*     Recent Labs     12/31/19  0323 12/30/19  1557 12/30/19  0415 12/29/19  1815 12/29/19  0501   WBC 8.4  --  6.3  --  5.2   HGB 8.4* 8.3* 7.5* 8.4* 8.6*   HCT 27.2* 26.9* 23.9* 27.2* 27.2*   *  --  179  --  159     No results found for: SDES  Lab Results   Component Value Date/Time    Culture result: PSEUDOMONAS SPECIES (PREDOMINATING) (A) 12/29/2019 03:40 PM    Culture result: (A) 12/29/2019 03:40 PM     LACTOSE FERMENTING GRAM NEGATIVE RODS (2,000 COLONIES/mL)    Culture result: NO GROWTH 5 DAYS 12/26/2019 10:23 AM    Culture result: NO GROWTH 5 DAYS 12/15/2019 03:37 PM    Culture result: NO GROWTH 1 DAY 12/15/2019 03:30 PM     Recent Results (from the past 24 hour(s))   GLUCOSE, POC    Collection Time: 12/30/19 11:07 AM   Result Value Ref Range    Glucose (POC) 164 (H) 65 - 100 mg/dL    Performed by JESSICA REYES    HGB & HCT    Collection Time: 12/30/19  3:57 PM   Result Value Ref Range    HGB 8.3 (L) 12.1 - 17.0 g/dL    HCT 26.9 (L) 36.6 - 50.3 %   GLUCOSE, POC    Collection Time: 12/30/19  5:00 PM   Result Value Ref Range    Glucose (POC) 153 (H) 65 - 100 mg/dL    Performed by Mel Cook    GLUCOSE, POC    Collection Time: 12/30/19  9:14 PM   Result Value Ref Range    Glucose (POC) 111 (H) 65 - 100 mg/dL    Performed by Nitesh Lovelace    DIGOXIN    Collection Time: 12/31/19  3:23 AM   Result Value Ref Range    Digoxin level 1.0 0.90 - 2.00 NG/ML   NT-PRO BNP    Collection Time: 12/31/19  3:23 AM   Result Value Ref Range    NT pro-BNP 7,464 (H) <094 PG/ML   METABOLIC PANEL, COMPREHENSIVE    Collection Time: 12/31/19  3:23 AM   Result Value Ref Range    Sodium 130 (L) 136 - 145 mmol/L Potassium 4.4 3.5 - 5.1 mmol/L    Chloride 95 (L) 97 - 108 mmol/L    CO2 28 21 - 32 mmol/L    Anion gap 7 5 - 15 mmol/L    Glucose 141 (H) 65 - 100 mg/dL    BUN 20 6 - 20 MG/DL    Creatinine 1.59 (H) 0.70 - 1.30 MG/DL    BUN/Creatinine ratio 13 12 - 20      GFR est AA 53 (L) >60 ml/min/1.73m2    GFR est non-AA 43 (L) >60 ml/min/1.73m2    Calcium 8.8 8.5 - 10.1 MG/DL    Bilirubin, total 1.0 0.2 - 1.0 MG/DL    ALT (SGPT) 12 12 - 78 U/L    AST (SGOT) 21 15 - 37 U/L    Alk.  phosphatase 106 45 - 117 U/L    Protein, total 6.7 6.4 - 8.2 g/dL    Albumin 2.8 (L) 3.5 - 5.0 g/dL    Globulin 3.9 2.0 - 4.0 g/dL    A-G Ratio 0.7 (L) 1.1 - 2.2     MAGNESIUM    Collection Time: 12/31/19  3:23 AM   Result Value Ref Range    Magnesium 1.7 1.6 - 2.4 mg/dL   CBC W/O DIFF    Collection Time: 12/31/19  3:23 AM   Result Value Ref Range    WBC 8.4 4.1 - 11.1 K/uL    RBC 2.86 (L) 4.10 - 5.70 M/uL    HGB 8.4 (L) 12.1 - 17.0 g/dL    HCT 27.2 (L) 36.6 - 50.3 %    MCV 95.1 80.0 - 99.0 FL    MCH 29.4 26.0 - 34.0 PG    MCHC 30.9 30.0 - 36.5 g/dL    RDW 18.2 (H) 11.5 - 14.5 %    PLATELET 174 (L) 994 - 400 K/uL    MPV 9.3 8.9 - 12.9 FL    NRBC 0.0 0  WBC    ABSOLUTE NRBC 0.00 0.00 - 0.01 K/uL   PROTHROMBIN TIME + INR    Collection Time: 12/31/19  3:23 AM   Result Value Ref Range    INR 2.1 (H) 0.9 - 1.1      Prothrombin time 20.8 (H) 9.0 - 11.1 sec   PTT    Collection Time: 12/31/19  3:23 AM   Result Value Ref Range    aPTT 40.7 (H) 22.1 - 32.0 sec    aPTT, therapeutic range     58.0 - 77.0 SECS   PROCALCITONIN    Collection Time: 12/31/19  3:26 AM   Result Value Ref Range    Procalcitonin 1.08 ng/mL   LACTIC ACID    Collection Time: 12/31/19  3:26 AM   Result Value Ref Range    Lactic acid 1.3 0.4 - 2.0 MMOL/L   LD    Collection Time: 12/31/19  3:26 AM   Result Value Ref Range     85 - 241 U/L   GLUCOSE, POC    Collection Time: 12/31/19  6:58 AM   Result Value Ref Range    Glucose (POC) 125 (H) 65 - 100 mg/dL    Performed by [facility] Jorge Doyle MD

## 2020-01-01 ENCOUNTER — APPOINTMENT (OUTPATIENT)
Dept: NON INVASIVE DIAGNOSTICS | Age: 70
DRG: 001 | End: 2020-01-01
Attending: NURSE PRACTITIONER
Payer: MEDICARE

## 2020-01-01 ENCOUNTER — OFFICE VISIT (OUTPATIENT)
Dept: CARDIOLOGY CLINIC | Age: 70
End: 2020-01-01

## 2020-01-01 ENCOUNTER — PATIENT OUTREACH (OUTPATIENT)
Dept: FAMILY MEDICINE CLINIC | Age: 70
End: 2020-01-01

## 2020-01-01 ENCOUNTER — TELEPHONE (OUTPATIENT)
Dept: CARDIOLOGY CLINIC | Age: 70
End: 2020-01-01

## 2020-01-01 ENCOUNTER — HOSPITAL ENCOUNTER (OUTPATIENT)
Dept: CT IMAGING | Age: 70
Discharge: HOME | End: 2020-05-22
Payer: MEDICARE

## 2020-01-01 ENCOUNTER — TELEPHONE ANTICOAG (OUTPATIENT)
Dept: CARDIOLOGY CLINIC | Age: 70
End: 2020-01-01

## 2020-01-01 ENCOUNTER — APPOINTMENT (OUTPATIENT)
Dept: GENERAL RADIOLOGY | Age: 70
DRG: 987 | End: 2020-01-01
Attending: UROLOGY
Payer: MEDICARE

## 2020-01-01 ENCOUNTER — APPOINTMENT (OUTPATIENT)
Dept: ULTRASOUND IMAGING | Age: 70
DRG: 987 | End: 2020-01-01
Attending: INTERNAL MEDICINE
Payer: MEDICARE

## 2020-01-01 ENCOUNTER — APPOINTMENT (OUTPATIENT)
Dept: GENERAL RADIOLOGY | Age: 70
DRG: 001 | End: 2020-01-01
Payer: MEDICARE

## 2020-01-01 ENCOUNTER — HOSPITAL ENCOUNTER (INPATIENT)
Age: 70
LOS: 6 days | Discharge: HOME HEALTH | DRG: 690 | End: 2020-07-08
Attending: EMERGENCY MEDICINE | Admitting: INTERNAL MEDICINE
Payer: MEDICARE

## 2020-01-01 ENCOUNTER — APPOINTMENT (OUTPATIENT)
Dept: PET IMAGING | Age: 70
DRG: 001 | End: 2020-01-01
Attending: NURSE PRACTITIONER
Payer: MEDICARE

## 2020-01-01 ENCOUNTER — APPOINTMENT (OUTPATIENT)
Dept: GENERAL RADIOLOGY | Age: 70
DRG: 872 | End: 2020-01-01
Attending: EMERGENCY MEDICINE
Payer: MEDICARE

## 2020-01-01 ENCOUNTER — HOSPITAL ENCOUNTER (OUTPATIENT)
Dept: VASCULAR SURGERY | Age: 70
Discharge: HOME | End: 2020-03-02
Payer: MEDICARE

## 2020-01-01 ENCOUNTER — PATIENT OUTREACH (OUTPATIENT)
Dept: INTERNAL MEDICINE CLINIC | Age: 70
End: 2020-01-01

## 2020-01-01 ENCOUNTER — APPOINTMENT (OUTPATIENT)
Dept: GENERAL RADIOLOGY | Age: 70
DRG: 001 | End: 2020-01-01
Attending: NURSE PRACTITIONER
Payer: MEDICARE

## 2020-01-01 ENCOUNTER — APPOINTMENT (OUTPATIENT)
Dept: CT IMAGING | Age: 70
DRG: 872 | End: 2020-01-01
Payer: MEDICARE

## 2020-01-01 ENCOUNTER — HOSPITAL ENCOUNTER (EMERGENCY)
Age: 70
Discharge: HOME | End: 2020-06-03
Attending: STUDENT IN AN ORGANIZED HEALTH CARE EDUCATION/TRAINING PROGRAM | Admitting: STUDENT IN AN ORGANIZED HEALTH CARE EDUCATION/TRAINING PROGRAM
Payer: MEDICARE

## 2020-01-01 ENCOUNTER — APPOINTMENT (OUTPATIENT)
Dept: CT IMAGING | Age: 70
DRG: 987 | End: 2020-01-01
Attending: EMERGENCY MEDICINE
Payer: MEDICARE

## 2020-01-01 ENCOUNTER — APPOINTMENT (OUTPATIENT)
Dept: NON INVASIVE DIAGNOSTICS | Age: 70
DRG: 872 | End: 2020-01-01
Payer: MEDICARE

## 2020-01-01 ENCOUNTER — VIRTUAL VISIT (OUTPATIENT)
Dept: CARDIOLOGY CLINIC | Age: 70
End: 2020-01-01

## 2020-01-01 ENCOUNTER — HOSPITAL ENCOUNTER (OUTPATIENT)
Dept: ULTRASOUND IMAGING | Age: 70
Discharge: HOME | End: 2020-03-12
Attending: NURSE PRACTITIONER
Payer: MEDICARE

## 2020-01-01 ENCOUNTER — APPOINTMENT (OUTPATIENT)
Dept: CT IMAGING | Age: 70
DRG: 872 | End: 2020-01-01
Attending: NURSE PRACTITIONER
Payer: MEDICARE

## 2020-01-01 ENCOUNTER — ANESTHESIA EVENT (OUTPATIENT)
Dept: SURGERY | Age: 70
DRG: 987 | End: 2020-01-01
Payer: MEDICARE

## 2020-01-01 ENCOUNTER — APPOINTMENT (OUTPATIENT)
Dept: NON INVASIVE DIAGNOSTICS | Age: 70
DRG: 690 | End: 2020-01-01
Attending: INTERNAL MEDICINE
Payer: MEDICARE

## 2020-01-01 ENCOUNTER — HOSPITAL ENCOUNTER (INPATIENT)
Age: 70
LOS: 4 days | Discharge: HOME HEALTH | DRG: 872 | End: 2020-04-24
Attending: EMERGENCY MEDICINE | Admitting: INTERNAL MEDICINE
Payer: MEDICARE

## 2020-01-01 ENCOUNTER — APPOINTMENT (OUTPATIENT)
Dept: CT IMAGING | Age: 70
End: 2020-01-01
Attending: STUDENT IN AN ORGANIZED HEALTH CARE EDUCATION/TRAINING PROGRAM
Payer: MEDICARE

## 2020-01-01 ENCOUNTER — DOCUMENTATION ONLY (OUTPATIENT)
Dept: CARDIOLOGY CLINIC | Age: 70
End: 2020-01-01

## 2020-01-01 ENCOUNTER — HOSPICE ADMISSION (OUTPATIENT)
Dept: HOSPICE | Facility: HOSPICE | Age: 70
End: 2020-01-01
Payer: MEDICARE

## 2020-01-01 ENCOUNTER — APPOINTMENT (OUTPATIENT)
Dept: GENERAL RADIOLOGY | Age: 70
DRG: 987 | End: 2020-01-01
Attending: INTERNAL MEDICINE
Payer: MEDICARE

## 2020-01-01 ENCOUNTER — HOSPITAL ENCOUNTER (OUTPATIENT)
Dept: CT IMAGING | Age: 70
Discharge: HOME | End: 2020-03-16
Attending: NURSE PRACTITIONER
Payer: MEDICARE

## 2020-01-01 ENCOUNTER — APPOINTMENT (OUTPATIENT)
Dept: NUCLEAR MEDICINE | Age: 70
DRG: 001 | End: 2020-01-01
Attending: NURSE PRACTITIONER
Payer: MEDICARE

## 2020-01-01 ENCOUNTER — HOSPITAL ENCOUNTER (INPATIENT)
Age: 70
LOS: 13 days | Discharge: HOSPICE | DRG: 987 | End: 2020-08-02
Attending: EMERGENCY MEDICINE | Admitting: INTERNAL MEDICINE
Payer: MEDICARE

## 2020-01-01 ENCOUNTER — HOSPITAL ENCOUNTER (OUTPATIENT)
Dept: CT IMAGING | Age: 70
Discharge: HOME | End: 2020-03-23
Attending: NURSE PRACTITIONER
Payer: MEDICARE

## 2020-01-01 ENCOUNTER — ANESTHESIA (OUTPATIENT)
Dept: SURGERY | Age: 70
DRG: 987 | End: 2020-01-01
Payer: MEDICARE

## 2020-01-01 ENCOUNTER — APPOINTMENT (OUTPATIENT)
Dept: CT IMAGING | Age: 70
DRG: 001 | End: 2020-01-01
Attending: NURSE PRACTITIONER
Payer: MEDICARE

## 2020-01-01 ENCOUNTER — APPOINTMENT (OUTPATIENT)
Dept: CT IMAGING | Age: 70
DRG: 690 | End: 2020-01-01
Attending: FAMILY MEDICINE
Payer: MEDICARE

## 2020-01-01 ENCOUNTER — HOSPITAL ENCOUNTER (INPATIENT)
Age: 70
LOS: 1 days | DRG: 951 | End: 2020-08-03
Attending: FAMILY MEDICINE | Admitting: INTERNAL MEDICINE
Payer: OTHER MISCELLANEOUS

## 2020-01-01 ENCOUNTER — APPOINTMENT (OUTPATIENT)
Dept: NON INVASIVE DIAGNOSTICS | Age: 70
DRG: 987 | End: 2020-01-01
Attending: INTERNAL MEDICINE
Payer: MEDICARE

## 2020-01-01 ENCOUNTER — HOSPITAL ENCOUNTER (OUTPATIENT)
Dept: LAB | Age: 70
Discharge: HOME | End: 2020-02-24
Payer: MEDICARE

## 2020-01-01 VITALS
DIASTOLIC BLOOD PRESSURE: 84 MMHG | WEIGHT: 181.66 LBS | SYSTOLIC BLOOD PRESSURE: 126 MMHG | OXYGEN SATURATION: 97 % | TEMPERATURE: 97.9 F | HEIGHT: 74 IN | BODY MASS INDEX: 23.31 KG/M2 | HEART RATE: 85 BPM | RESPIRATION RATE: 18 BRPM

## 2020-01-01 VITALS
BODY MASS INDEX: 22.97 KG/M2 | HEIGHT: 74 IN | OXYGEN SATURATION: 98 % | TEMPERATURE: 97.6 F | SYSTOLIC BLOOD PRESSURE: 68 MMHG | HEART RATE: 89 BPM | WEIGHT: 179 LBS | RESPIRATION RATE: 20 BRPM

## 2020-01-01 VITALS
BODY MASS INDEX: 24 KG/M2 | TEMPERATURE: 98 F | HEIGHT: 74 IN | HEART RATE: 107 BPM | OXYGEN SATURATION: 98 % | SYSTOLIC BLOOD PRESSURE: 94 MMHG | WEIGHT: 187 LBS | RESPIRATION RATE: 20 BRPM

## 2020-01-01 VITALS
HEIGHT: 74 IN | TEMPERATURE: 98.2 F | OXYGEN SATURATION: 95 % | WEIGHT: 185 LBS | BODY MASS INDEX: 23.74 KG/M2 | RESPIRATION RATE: 20 BRPM

## 2020-01-01 VITALS
HEIGHT: 74 IN | WEIGHT: 189 LBS | SYSTOLIC BLOOD PRESSURE: 84 MMHG | BODY MASS INDEX: 24.26 KG/M2 | TEMPERATURE: 98.3 F | RESPIRATION RATE: 24 BRPM | OXYGEN SATURATION: 96 % | HEART RATE: 87 BPM

## 2020-01-01 VITALS
HEIGHT: 74 IN | BODY MASS INDEX: 20.95 KG/M2 | WEIGHT: 163.2 LBS | RESPIRATION RATE: 20 BRPM | TEMPERATURE: 99 F | OXYGEN SATURATION: 100 % | SYSTOLIC BLOOD PRESSURE: 110 MMHG | DIASTOLIC BLOOD PRESSURE: 90 MMHG | HEART RATE: 100 BPM

## 2020-01-01 VITALS
DIASTOLIC BLOOD PRESSURE: 72 MMHG | RESPIRATION RATE: 20 BRPM | TEMPERATURE: 97.6 F | HEART RATE: 110 BPM | WEIGHT: 188.05 LBS | BODY MASS INDEX: 24.13 KG/M2 | HEIGHT: 74 IN | SYSTOLIC BLOOD PRESSURE: 91 MMHG | OXYGEN SATURATION: 99 %

## 2020-01-01 VITALS
BODY MASS INDEX: 23.61 KG/M2 | WEIGHT: 184 LBS | HEIGHT: 74 IN | SYSTOLIC BLOOD PRESSURE: 78 MMHG | TEMPERATURE: 98.2 F | HEART RATE: 86 BPM | OXYGEN SATURATION: 97 % | RESPIRATION RATE: 20 BRPM

## 2020-01-01 VITALS
WEIGHT: 189 LBS | HEIGHT: 74 IN | RESPIRATION RATE: 20 BRPM | HEART RATE: 73 BPM | TEMPERATURE: 98 F | BODY MASS INDEX: 24.26 KG/M2 | OXYGEN SATURATION: 100 % | SYSTOLIC BLOOD PRESSURE: 96 MMHG

## 2020-01-01 VITALS — WEIGHT: 182 LBS | HEIGHT: 74 IN | BODY MASS INDEX: 23.36 KG/M2 | SYSTOLIC BLOOD PRESSURE: 82 MMHG

## 2020-01-01 VITALS
HEART RATE: 100 BPM | OXYGEN SATURATION: 99 % | TEMPERATURE: 97.6 F | HEIGHT: 74 IN | RESPIRATION RATE: 18 BRPM | WEIGHT: 186 LBS | SYSTOLIC BLOOD PRESSURE: 86 MMHG | BODY MASS INDEX: 23.87 KG/M2

## 2020-01-01 VITALS
WEIGHT: 184 LBS | SYSTOLIC BLOOD PRESSURE: 82 MMHG | RESPIRATION RATE: 18 BRPM | TEMPERATURE: 98.1 F | HEIGHT: 74 IN | BODY MASS INDEX: 23.61 KG/M2 | OXYGEN SATURATION: 98 % | HEART RATE: 76 BPM

## 2020-01-01 VITALS
HEART RATE: 68 BPM | BODY MASS INDEX: 23.54 KG/M2 | RESPIRATION RATE: 20 BRPM | OXYGEN SATURATION: 99 % | WEIGHT: 183.4 LBS | SYSTOLIC BLOOD PRESSURE: 78 MMHG | HEIGHT: 74 IN | TEMPERATURE: 97.6 F

## 2020-01-01 VITALS — BODY MASS INDEX: 24 KG/M2 | SYSTOLIC BLOOD PRESSURE: 50 MMHG | WEIGHT: 187 LBS | HEIGHT: 74 IN

## 2020-01-01 VITALS
WEIGHT: 189 LBS | HEART RATE: 60 BPM | OXYGEN SATURATION: 95 % | TEMPERATURE: 98.2 F | BODY MASS INDEX: 24.26 KG/M2 | HEIGHT: 74 IN | RESPIRATION RATE: 20 BRPM | SYSTOLIC BLOOD PRESSURE: 84 MMHG

## 2020-01-01 VITALS — OXYGEN SATURATION: 97 % | TEMPERATURE: 98.4 F | RESPIRATION RATE: 15 BRPM | HEART RATE: 93 BPM

## 2020-01-01 VITALS
OXYGEN SATURATION: 99 % | TEMPERATURE: 98.1 F | WEIGHT: 177.91 LBS | BODY MASS INDEX: 22.83 KG/M2 | RESPIRATION RATE: 20 BRPM | HEART RATE: 80 BPM | HEIGHT: 74 IN

## 2020-01-01 VITALS — SYSTOLIC BLOOD PRESSURE: 60 MMHG | TEMPERATURE: 99.6 F

## 2020-01-01 DIAGNOSIS — Z79.01 CHRONIC ANTICOAGULATION: ICD-10-CM

## 2020-01-01 DIAGNOSIS — I50.9 CHRONIC CONGESTIVE HEART FAILURE, UNSPECIFIED HEART FAILURE TYPE (HCC): ICD-10-CM

## 2020-01-01 DIAGNOSIS — B96.5 BACTEREMIA DUE TO PSEUDOMONAS: ICD-10-CM

## 2020-01-01 DIAGNOSIS — Z95.810 BIVENTRICULAR AUTOMATIC IMPLANTABLE CARDIOVERTER DEFIBRILLATOR IN SITU: ICD-10-CM

## 2020-01-01 DIAGNOSIS — I50.9 CHRONIC CONGESTIVE HEART FAILURE, UNSPECIFIED HEART FAILURE TYPE (HCC): Primary | ICD-10-CM

## 2020-01-01 DIAGNOSIS — L24.A9 WOUND DRAINAGE: ICD-10-CM

## 2020-01-01 DIAGNOSIS — C67.9 BLADDER CARCINOMA (HCC): ICD-10-CM

## 2020-01-01 DIAGNOSIS — R78.81 BACTEREMIA DUE TO GRAM-NEGATIVE BACTERIA: ICD-10-CM

## 2020-01-01 DIAGNOSIS — I50.22 SYSTOLIC CHF, CHRONIC (HCC): ICD-10-CM

## 2020-01-01 DIAGNOSIS — R45.1 AGITATION: ICD-10-CM

## 2020-01-01 DIAGNOSIS — Z95.811 LVAD (LEFT VENTRICULAR ASSIST DEVICE) PRESENT (HCC): Primary | ICD-10-CM

## 2020-01-01 DIAGNOSIS — R25.1 TREMOR: ICD-10-CM

## 2020-01-01 DIAGNOSIS — I73.9 PERIPHERAL VASCULAR DISEASE (HCC): ICD-10-CM

## 2020-01-01 DIAGNOSIS — Z95.811 LVAD (LEFT VENTRICULAR ASSIST DEVICE) PRESENT (HCC): ICD-10-CM

## 2020-01-01 DIAGNOSIS — I50.23 ACUTE ON CHRONIC SYSTOLIC CHF (CONGESTIVE HEART FAILURE) (HCC): ICD-10-CM

## 2020-01-01 DIAGNOSIS — Z51.5 END OF LIFE CARE: ICD-10-CM

## 2020-01-01 DIAGNOSIS — I25.5 ISCHEMIC CARDIOMYOPATHY: ICD-10-CM

## 2020-01-01 DIAGNOSIS — G47.33 OBSTRUCTIVE SLEEP APNEA SYNDROME: ICD-10-CM

## 2020-01-01 DIAGNOSIS — L02.211 ABDOMINAL WALL ABSCESS: ICD-10-CM

## 2020-01-01 DIAGNOSIS — L02.211 ABDOMINAL WALL ABSCESS: Primary | ICD-10-CM

## 2020-01-01 DIAGNOSIS — Z79.01 CHRONIC ANTICOAGULATION: Primary | ICD-10-CM

## 2020-01-01 DIAGNOSIS — R45.1 RESTLESSNESS AND AGITATION: ICD-10-CM

## 2020-01-01 DIAGNOSIS — R45.1 RESTLESSNESS: ICD-10-CM

## 2020-01-01 DIAGNOSIS — R41.0 DELIRIUM: ICD-10-CM

## 2020-01-01 DIAGNOSIS — R42 DIZZINESS: ICD-10-CM

## 2020-01-01 DIAGNOSIS — N17.9 ACUTE KIDNEY INJURY SUPERIMPOSED ON CHRONIC KIDNEY DISEASE (HCC): ICD-10-CM

## 2020-01-01 DIAGNOSIS — E86.9 VOLUME DEPLETION: ICD-10-CM

## 2020-01-01 DIAGNOSIS — R30.0 DYSURIA: ICD-10-CM

## 2020-01-01 DIAGNOSIS — N18.9 ACUTE KIDNEY INJURY SUPERIMPOSED ON CHRONIC KIDNEY DISEASE (HCC): ICD-10-CM

## 2020-01-01 DIAGNOSIS — D64.9 ANEMIA, UNSPECIFIED TYPE: ICD-10-CM

## 2020-01-01 DIAGNOSIS — I50.9 ACUTE DECOMPENSATED HEART FAILURE (HCC): ICD-10-CM

## 2020-01-01 DIAGNOSIS — I48.0 PAROXYSMAL ATRIAL FIBRILLATION (HCC): ICD-10-CM

## 2020-01-01 DIAGNOSIS — Z79.01 CURRENT USE OF LONG TERM ANTICOAGULATION: ICD-10-CM

## 2020-01-01 DIAGNOSIS — E55.9 VITAMIN D DEFICIENCY, UNSPECIFIED: ICD-10-CM

## 2020-01-01 DIAGNOSIS — G93.40 ACUTE ENCEPHALOPATHY: ICD-10-CM

## 2020-01-01 DIAGNOSIS — A41.52: ICD-10-CM

## 2020-01-01 DIAGNOSIS — N30.00 ACUTE CYSTITIS WITHOUT HEMATURIA: Primary | ICD-10-CM

## 2020-01-01 DIAGNOSIS — I50.84 END STAGE HEART FAILURE (HCC): ICD-10-CM

## 2020-01-01 DIAGNOSIS — T88.8XXA FLUID COLLECTION AT SURGICAL SITE, INITIAL ENCOUNTER: ICD-10-CM

## 2020-01-01 DIAGNOSIS — R31.9 HEMATURIA, UNSPECIFIED TYPE: ICD-10-CM

## 2020-01-01 DIAGNOSIS — R04.0 EPISTAXIS: ICD-10-CM

## 2020-01-01 DIAGNOSIS — T88.8XXA FLUID COLLECTION AT SURGICAL SITE, INITIAL ENCOUNTER: Primary | ICD-10-CM

## 2020-01-01 DIAGNOSIS — Z86.39 HISTORY OF CALORIC MALNUTRITION: ICD-10-CM

## 2020-01-01 DIAGNOSIS — R52 PAIN: ICD-10-CM

## 2020-01-01 DIAGNOSIS — N30.01 ACUTE CYSTITIS WITH HEMATURIA: Primary | ICD-10-CM

## 2020-01-01 DIAGNOSIS — R06.02 SOB (SHORTNESS OF BREATH): ICD-10-CM

## 2020-01-01 DIAGNOSIS — Z71.89 GOALS OF CARE, COUNSELING/DISCUSSION: ICD-10-CM

## 2020-01-01 DIAGNOSIS — R31.0 GROSS HEMATURIA: ICD-10-CM

## 2020-01-01 DIAGNOSIS — N30.00 ACUTE CYSTITIS WITHOUT HEMATURIA: ICD-10-CM

## 2020-01-01 DIAGNOSIS — Z51.5 HOSPICE CARE PATIENT: ICD-10-CM

## 2020-01-01 DIAGNOSIS — S09.90XA INJURY OF HEAD, INITIAL ENCOUNTER: Primary | ICD-10-CM

## 2020-01-01 DIAGNOSIS — R49.0 HOARSENESS OF VOICE: ICD-10-CM

## 2020-01-01 DIAGNOSIS — R42 LIGHTHEADEDNESS: ICD-10-CM

## 2020-01-01 DIAGNOSIS — I50.22 CHRONIC SYSTOLIC CONGESTIVE HEART FAILURE (HCC): ICD-10-CM

## 2020-01-01 DIAGNOSIS — R06.02 SHORTNESS OF BREATH: ICD-10-CM

## 2020-01-01 DIAGNOSIS — C68.9: ICD-10-CM

## 2020-01-01 DIAGNOSIS — N17.9 ACUTE RENAL FAILURE, UNSPECIFIED ACUTE RENAL FAILURE TYPE (HCC): ICD-10-CM

## 2020-01-01 DIAGNOSIS — R53.81 PHYSICAL DECONDITIONING: ICD-10-CM

## 2020-01-01 DIAGNOSIS — N39.0 URINARY TRACT INFECTION WITHOUT HEMATURIA, SITE UNSPECIFIED: ICD-10-CM

## 2020-01-01 DIAGNOSIS — R78.81 BACTEREMIA DUE TO PSEUDOMONAS: ICD-10-CM

## 2020-01-01 LAB
25(OH)D3 SERPL-MCNC: 38.4 NG/ML (ref 30–100)
25(OH)D3+25(OH)D2 SERPL-MCNC: 48.3 NG/ML (ref 30–100)
ABO + RH BLD: NORMAL
ALBUMIN SERPL-MCNC: 2 G/DL (ref 3.5–5)
ALBUMIN SERPL-MCNC: 2.1 G/DL (ref 3.5–5)
ALBUMIN SERPL-MCNC: 2.2 G/DL (ref 3.5–5)
ALBUMIN SERPL-MCNC: 2.3 G/DL (ref 3.5–5)
ALBUMIN SERPL-MCNC: 2.4 G/DL (ref 3.5–5)
ALBUMIN SERPL-MCNC: 2.5 G/DL (ref 3.5–5)
ALBUMIN SERPL-MCNC: 2.6 G/DL (ref 3.5–5)
ALBUMIN SERPL-MCNC: 2.7 G/DL (ref 3.5–5)
ALBUMIN SERPL-MCNC: 2.8 G/DL (ref 3.5–5)
ALBUMIN SERPL-MCNC: 2.9 G/DL (ref 3.5–5)
ALBUMIN SERPL-MCNC: 3 G/DL (ref 3.5–5)
ALBUMIN SERPL-MCNC: 3 G/DL (ref 3.8–4.8)
ALBUMIN SERPL-MCNC: 3.1 G/DL (ref 3.5–5)
ALBUMIN SERPL-MCNC: 3.2 G/DL (ref 3.5–5)
ALBUMIN SERPL-MCNC: 3.2 G/DL (ref 3.5–5)
ALBUMIN SERPL-MCNC: 3.3 G/DL (ref 3.8–4.8)
ALBUMIN SERPL-MCNC: 3.4 G/DL (ref 3.5–5)
ALBUMIN SERPL-MCNC: 3.5 G/DL (ref 3.8–4.8)
ALBUMIN/GLOB SERPL: 0.5 {RATIO} (ref 1.1–2.2)
ALBUMIN/GLOB SERPL: 0.6 {RATIO} (ref 1.1–2.2)
ALBUMIN/GLOB SERPL: 0.7 {RATIO} (ref 1.1–2.2)
ALBUMIN/GLOB SERPL: 0.8 {RATIO} (ref 1.1–2.2)
ALBUMIN/GLOB SERPL: 0.8 {RATIO} (ref 1.2–2.2)
ALBUMIN/GLOB SERPL: 0.9 {RATIO} (ref 1.1–2.2)
ALBUMIN/GLOB SERPL: 0.9 {RATIO} (ref 1.1–2.2)
ALBUMIN/GLOB SERPL: 1.1 {RATIO} (ref 1.2–2.2)
ALBUMIN/GLOB SERPL: 1.2 {RATIO} (ref 1.2–2.2)
ALP SERPL-CCNC: 100 U/L (ref 45–117)
ALP SERPL-CCNC: 101 U/L (ref 45–117)
ALP SERPL-CCNC: 101 U/L (ref 45–117)
ALP SERPL-CCNC: 102 U/L (ref 45–117)
ALP SERPL-CCNC: 102 U/L (ref 45–117)
ALP SERPL-CCNC: 105 U/L (ref 45–117)
ALP SERPL-CCNC: 106 U/L (ref 45–117)
ALP SERPL-CCNC: 107 U/L (ref 45–117)
ALP SERPL-CCNC: 107 U/L (ref 45–117)
ALP SERPL-CCNC: 110 U/L (ref 45–117)
ALP SERPL-CCNC: 110 U/L (ref 45–117)
ALP SERPL-CCNC: 111 IU/L (ref 39–117)
ALP SERPL-CCNC: 111 U/L (ref 45–117)
ALP SERPL-CCNC: 112 U/L (ref 45–117)
ALP SERPL-CCNC: 112 U/L (ref 45–117)
ALP SERPL-CCNC: 113 U/L (ref 45–117)
ALP SERPL-CCNC: 114 U/L (ref 45–117)
ALP SERPL-CCNC: 115 U/L (ref 45–117)
ALP SERPL-CCNC: 116 U/L (ref 45–117)
ALP SERPL-CCNC: 116 U/L (ref 45–117)
ALP SERPL-CCNC: 117 U/L (ref 45–117)
ALP SERPL-CCNC: 118 U/L (ref 45–117)
ALP SERPL-CCNC: 118 U/L (ref 45–117)
ALP SERPL-CCNC: 120 U/L (ref 45–117)
ALP SERPL-CCNC: 121 U/L (ref 45–117)
ALP SERPL-CCNC: 123 U/L (ref 45–117)
ALP SERPL-CCNC: 123 U/L (ref 45–117)
ALP SERPL-CCNC: 124 U/L (ref 45–117)
ALP SERPL-CCNC: 125 U/L (ref 45–117)
ALP SERPL-CCNC: 135 U/L (ref 45–117)
ALP SERPL-CCNC: 140 U/L (ref 45–117)
ALP SERPL-CCNC: 157 IU/L (ref 39–117)
ALP SERPL-CCNC: 161 IU/L (ref 39–117)
ALP SERPL-CCNC: 164 U/L (ref 45–117)
ALP SERPL-CCNC: 164 U/L (ref 45–117)
ALP SERPL-CCNC: 167 U/L (ref 45–117)
ALP SERPL-CCNC: 168 U/L (ref 45–117)
ALP SERPL-CCNC: 169 U/L (ref 45–117)
ALP SERPL-CCNC: 172 U/L (ref 45–117)
ALP SERPL-CCNC: 88 U/L (ref 45–117)
ALP SERPL-CCNC: 88 U/L (ref 45–117)
ALP SERPL-CCNC: 89 U/L (ref 45–117)
ALP SERPL-CCNC: 90 U/L (ref 45–117)
ALP SERPL-CCNC: 91 U/L (ref 45–117)
ALP SERPL-CCNC: 91 U/L (ref 45–117)
ALP SERPL-CCNC: 92 U/L (ref 45–117)
ALP SERPL-CCNC: 93 U/L (ref 45–117)
ALP SERPL-CCNC: 94 U/L (ref 45–117)
ALP SERPL-CCNC: 94 U/L (ref 45–117)
ALP SERPL-CCNC: 95 U/L (ref 45–117)
ALP SERPL-CCNC: 96 U/L (ref 45–117)
ALP SERPL-CCNC: 98 U/L (ref 45–117)
ALP SERPL-CCNC: 98 U/L (ref 45–117)
ALP SERPL-CCNC: 99 U/L (ref 45–117)
ALT SERPL-CCNC: 10 IU/L (ref 0–44)
ALT SERPL-CCNC: 10 IU/L (ref 0–44)
ALT SERPL-CCNC: 10 U/L (ref 12–78)
ALT SERPL-CCNC: 11 U/L (ref 12–78)
ALT SERPL-CCNC: 12 U/L (ref 12–78)
ALT SERPL-CCNC: 13 U/L (ref 12–78)
ALT SERPL-CCNC: 14 U/L (ref 12–78)
ALT SERPL-CCNC: 15 U/L (ref 12–78)
ALT SERPL-CCNC: 15 U/L (ref 12–78)
ALT SERPL-CCNC: 16 U/L (ref 12–78)
ALT SERPL-CCNC: 17 U/L (ref 12–78)
ALT SERPL-CCNC: 18 U/L (ref 12–78)
ALT SERPL-CCNC: 19 U/L (ref 12–78)
ALT SERPL-CCNC: 19 U/L (ref 12–78)
ALT SERPL-CCNC: 22 U/L (ref 12–78)
ALT SERPL-CCNC: 23 U/L (ref 12–78)
ALT SERPL-CCNC: 24 U/L (ref 12–78)
ALT SERPL-CCNC: 25 U/L (ref 12–78)
ALT SERPL-CCNC: 25 U/L (ref 12–78)
ALT SERPL-CCNC: 27 U/L (ref 12–78)
ALT SERPL-CCNC: 31 U/L (ref 12–78)
ALT SERPL-CCNC: 36 U/L (ref 12–78)
ALT SERPL-CCNC: 41 U/L (ref 12–78)
ALT SERPL-CCNC: 44 U/L (ref 12–78)
ALT SERPL-CCNC: 6 IU/L (ref 0–44)
ALT SERPL-CCNC: 8 U/L (ref 12–78)
ALT SERPL-CCNC: 8 U/L (ref 12–78)
ALT SERPL-CCNC: 9 U/L (ref 12–78)
ALT SERPL-CCNC: 9 U/L (ref 12–78)
AMORPH CRY URNS QL MICRO: ABNORMAL
AMORPH CRY URNS QL MICRO: ABNORMAL
ANION GAP SERPL CALC-SCNC: 10 MMOL/L (ref 5–15)
ANION GAP SERPL CALC-SCNC: 10 MMOL/L (ref 5–15)
ANION GAP SERPL CALC-SCNC: 12 MMOL/L (ref 5–15)
ANION GAP SERPL CALC-SCNC: 2 MMOL/L (ref 5–15)
ANION GAP SERPL CALC-SCNC: 3 MMOL/L (ref 5–15)
ANION GAP SERPL CALC-SCNC: 4 MMOL/L (ref 5–15)
ANION GAP SERPL CALC-SCNC: 5 MMOL/L (ref 5–15)
ANION GAP SERPL CALC-SCNC: 6 MMOL/L (ref 5–15)
ANION GAP SERPL CALC-SCNC: 7 MMOL/L (ref 5–15)
ANION GAP SERPL CALC-SCNC: 8 MMOL/L (ref 5–15)
ANION GAP SERPL CALC-SCNC: 9 MMOL/L (ref 5–15)
ANION GAP SERPL CALC-SCNC: 9 MMOL/L (ref 5–15)
ANTI-COMPLEMENT (C3B,C3D): NORMAL
ANTIGENS PRESENT BLD: NORMAL
ANTIGENS PRESENT RBC DONR: NORMAL
APPEARANCE UR: ABNORMAL
APPEARANCE UR: CLEAR
APPEARANCE UR: CLEAR
APTT PPP: 27.3 SEC (ref 22.1–32)
APTT PPP: 27.9 SEC (ref 22.1–32)
APTT PPP: 30.7 SEC (ref 22.1–32)
APTT PPP: 31.7 SEC (ref 22.1–32)
APTT PPP: 32.5 SEC (ref 22.1–32)
APTT PPP: 33.3 SEC (ref 22.1–32)
APTT PPP: 33.7 SEC (ref 22.1–32)
APTT PPP: 34.5 SEC (ref 22.1–32)
APTT PPP: 34.5 SEC (ref 22.1–32)
APTT PPP: 34.9 SEC (ref 22.1–32)
APTT PPP: 35.3 SEC (ref 22.1–32)
APTT PPP: 36.1 SEC (ref 22.1–32)
APTT PPP: 37.6 SEC (ref 22.1–32)
APTT PPP: 38.2 SEC (ref 22.1–32)
APTT PPP: 38.7 SEC (ref 22.1–32)
APTT PPP: 40.3 SEC (ref 22.1–32)
APTT PPP: 41.6 SEC (ref 22.1–32)
APTT PPP: 41.7 SEC (ref 22.1–32)
APTT PPP: 41.8 SEC (ref 22.1–32)
APTT PPP: 42.4 SEC (ref 22.1–32)
APTT PPP: 42.7 SEC (ref 22.1–32)
APTT PPP: 42.9 SEC (ref 22.1–32)
ARTERIAL PATENCY WRIST A: YES
AST SERPL-CCNC: 10 U/L (ref 15–37)
AST SERPL-CCNC: 11 U/L (ref 15–37)
AST SERPL-CCNC: 12 U/L (ref 15–37)
AST SERPL-CCNC: 12 U/L (ref 15–37)
AST SERPL-CCNC: 13 IU/L (ref 0–40)
AST SERPL-CCNC: 13 U/L (ref 15–37)
AST SERPL-CCNC: 13 U/L (ref 15–37)
AST SERPL-CCNC: 14 IU/L (ref 0–40)
AST SERPL-CCNC: 14 U/L (ref 15–37)
AST SERPL-CCNC: 15 U/L (ref 15–37)
AST SERPL-CCNC: 16 U/L (ref 15–37)
AST SERPL-CCNC: 16 U/L (ref 15–37)
AST SERPL-CCNC: 17 U/L (ref 15–37)
AST SERPL-CCNC: 18 U/L (ref 15–37)
AST SERPL-CCNC: 19 U/L (ref 15–37)
AST SERPL-CCNC: 19 U/L (ref 15–37)
AST SERPL-CCNC: 20 IU/L (ref 0–40)
AST SERPL-CCNC: 20 U/L (ref 15–37)
AST SERPL-CCNC: 20 U/L (ref 15–37)
AST SERPL-CCNC: 21 U/L (ref 15–37)
AST SERPL-CCNC: 22 U/L (ref 15–37)
AST SERPL-CCNC: 29 U/L (ref 15–37)
AST SERPL-CCNC: 31 U/L (ref 15–37)
AST SERPL-CCNC: 38 U/L (ref 15–37)
AST SERPL-CCNC: 39 U/L (ref 15–37)
AST SERPL-CCNC: 57 U/L (ref 15–37)
AST SERPL-CCNC: 6 U/L (ref 15–37)
AST SERPL-CCNC: 6 U/L (ref 15–37)
AST SERPL-CCNC: 64 U/L (ref 15–37)
AST SERPL-CCNC: 7 U/L (ref 15–37)
AST SERPL-CCNC: 8 U/L (ref 15–37)
AST SERPL-CCNC: 9 U/L (ref 15–37)
AST SERPL-CCNC: 9 U/L (ref 15–37)
ATRIAL RATE: 121 BPM
ATRIAL RATE: 19 BPM
ATRIAL RATE: 78 BPM
BACTERIA SPEC AEROBE CULT: ABNORMAL
BACTERIA SPEC CULT: ABNORMAL
BACTERIA SPEC CULT: NORMAL
BACTERIA URNS QL MICRO: ABNORMAL /HPF
BACTERIA URNS QL MICRO: ABNORMAL /HPF
BACTERIA URNS QL MICRO: NEGATIVE /HPF
BASE DEFICIT BLD-SCNC: 3 MMOL/L
BASOPHILS # BLD: 0 K/UL (ref 0–0.1)
BASOPHILS # BLD: 0.1 K/UL (ref 0–0.1)
BASOPHILS # BLD: 0.1 K/UL (ref 0–0.1)
BASOPHILS NFR BLD: 0 % (ref 0–1)
BASOPHILS NFR BLD: 1 % (ref 0–1)
BDY SITE: ABNORMAL
BILIRUB DIRECT SERPL-MCNC: 0.2 MG/DL (ref 0–0.2)
BILIRUB DIRECT SERPL-MCNC: 0.3 MG/DL (ref 0–0.2)
BILIRUB SERPL-MCNC: 0.3 MG/DL (ref 0.2–1)
BILIRUB SERPL-MCNC: 0.3 MG/DL (ref 0.2–1)
BILIRUB SERPL-MCNC: 0.4 MG/DL (ref 0.2–1)
BILIRUB SERPL-MCNC: 0.4 MG/DL (ref 0–1.2)
BILIRUB SERPL-MCNC: 0.5 MG/DL (ref 0.2–1)
BILIRUB SERPL-MCNC: 0.6 MG/DL (ref 0.2–1)
BILIRUB SERPL-MCNC: 0.6 MG/DL (ref 0–1.2)
BILIRUB SERPL-MCNC: 0.6 MG/DL (ref 0–1.2)
BILIRUB SERPL-MCNC: 0.7 MG/DL (ref 0.2–1)
BILIRUB SERPL-MCNC: 0.8 MG/DL (ref 0.2–1)
BILIRUB SERPL-MCNC: 0.9 MG/DL (ref 0.2–1)
BILIRUB SERPL-MCNC: 0.9 MG/DL (ref 0.2–1)
BILIRUB SERPL-MCNC: 1 MG/DL (ref 0.2–1)
BILIRUB UR QL: NEGATIVE
BLASTS NFR BLD MANUAL: 0 %
BLD GP AB SCN SERPL QL ELUTION: NORMAL
BLD PROD TYP BPU: NORMAL
BLOOD BANK CMNT PATIENT-IMP: NORMAL
BLOOD GROUP ANTIBODIES SERPL: NORMAL
BNP SERPL-MCNC: 1327 PG/ML
BNP SERPL-MCNC: 1683 PG/ML
BNP SERPL-MCNC: 2772 PG/ML
BNP SERPL-MCNC: 2885 PG/ML
BNP SERPL-MCNC: 2976 PG/ML
BNP SERPL-MCNC: 3193 PG/ML
BNP SERPL-MCNC: 3559 PG/ML
BNP SERPL-MCNC: 3990 PG/ML
BNP SERPL-MCNC: 4058 PG/ML
BNP SERPL-MCNC: 4266 PG/ML
BNP SERPL-MCNC: 4364 PG/ML
BNP SERPL-MCNC: 4564 PG/ML
BNP SERPL-MCNC: 4695 PG/ML
BNP SERPL-MCNC: 4714 PG/ML
BNP SERPL-MCNC: 4844 PG/ML
BNP SERPL-MCNC: 4904 PG/ML
BNP SERPL-MCNC: 5000 PG/ML
BNP SERPL-MCNC: 5023 PG/ML
BNP SERPL-MCNC: 5245 PG/ML
BNP SERPL-MCNC: 6116 PG/ML
BNP SERPL-MCNC: 6700 PG/ML
BNP SERPL-MCNC: 6897 PG/ML
BNP SERPL-MCNC: 6950 PG/ML
BNP SERPL-MCNC: 6956 PG/ML
BNP SERPL-MCNC: 7087 PG/ML
BNP SERPL-MCNC: 7672 PG/ML
BNP SERPL-MCNC: 7691 PG/ML
BNP SERPL-MCNC: 7723 PG/ML
BNP SERPL-MCNC: 7856 PG/ML
BNP SERPL-MCNC: 7910 PG/ML
BNP SERPL-MCNC: 8568 PG/ML
BNP SERPL-MCNC: 8604 PG/ML
BNP SERPL-MCNC: 8921 PG/ML
BNP SERPL-MCNC: 8935 PG/ML
BNP SERPL-MCNC: 9002 PG/ML
BNP SERPL-MCNC: 9402 PG/ML
BNP SERPL-MCNC: 9674 PG/ML
BNP SERPL-MCNC: 9716 PG/ML
BNP SERPL-MCNC: 9902 PG/ML
BNP SERPL-MCNC: 9988 PG/ML
BNP SERPL-MCNC: ABNORMAL PG/ML
BPU ID: NORMAL
BUN SERPL-MCNC: 17 MG/DL (ref 6–20)
BUN SERPL-MCNC: 17 MG/DL (ref 8–27)
BUN SERPL-MCNC: 18 MG/DL (ref 6–20)
BUN SERPL-MCNC: 19 MG/DL (ref 6–20)
BUN SERPL-MCNC: 20 MG/DL (ref 6–20)
BUN SERPL-MCNC: 20 MG/DL (ref 6–20)
BUN SERPL-MCNC: 21 MG/DL (ref 6–20)
BUN SERPL-MCNC: 22 MG/DL (ref 6–20)
BUN SERPL-MCNC: 22 MG/DL (ref 6–20)
BUN SERPL-MCNC: 23 MG/DL (ref 6–20)
BUN SERPL-MCNC: 24 MG/DL (ref 6–20)
BUN SERPL-MCNC: 24 MG/DL (ref 8–27)
BUN SERPL-MCNC: 25 MG/DL (ref 6–20)
BUN SERPL-MCNC: 26 MG/DL (ref 6–20)
BUN SERPL-MCNC: 26 MG/DL (ref 8–27)
BUN SERPL-MCNC: 27 MG/DL (ref 6–20)
BUN SERPL-MCNC: 27 MG/DL (ref 6–20)
BUN SERPL-MCNC: 28 MG/DL (ref 6–20)
BUN SERPL-MCNC: 29 MG/DL (ref 6–20)
BUN SERPL-MCNC: 29 MG/DL (ref 6–20)
BUN SERPL-MCNC: 30 MG/DL (ref 6–20)
BUN SERPL-MCNC: 30 MG/DL (ref 6–20)
BUN SERPL-MCNC: 33 MG/DL (ref 6–20)
BUN SERPL-MCNC: 51 MG/DL (ref 6–20)
BUN/CREAT SERPL: 13 (ref 12–20)
BUN/CREAT SERPL: 13 (ref 12–20)
BUN/CREAT SERPL: 14 (ref 12–20)
BUN/CREAT SERPL: 15 (ref 10–24)
BUN/CREAT SERPL: 15 (ref 12–20)
BUN/CREAT SERPL: 16 (ref 12–20)
BUN/CREAT SERPL: 17 (ref 10–24)
BUN/CREAT SERPL: 17 (ref 12–20)
BUN/CREAT SERPL: 18 (ref 12–20)
BUN/CREAT SERPL: 19 (ref 12–20)
BUN/CREAT SERPL: 20 (ref 12–20)
BUN/CREAT SERPL: 20 (ref 12–20)
BUN/CREAT SERPL: 21 (ref 12–20)
BUN/CREAT SERPL: 22 (ref 12–20)
BUN/CREAT SERPL: 23 (ref 12–20)
BUN/CREAT SERPL: 23 (ref 12–20)
BUN/CREAT SERPL: 24 (ref 12–20)
BUN/CREAT SERPL: 25 (ref 10–24)
BUN/CREAT SERPL: 25 (ref 12–20)
BUN/CREAT SERPL: 26 (ref 12–20)
BUN/CREAT SERPL: 32 (ref 12–20)
CA-I BLD-SCNC: 1.22 MMOL/L (ref 1.12–1.32)
CALCIUM SERPL-MCNC: 7.5 MG/DL (ref 8.5–10.1)
CALCIUM SERPL-MCNC: 7.8 MG/DL (ref 8.5–10.1)
CALCIUM SERPL-MCNC: 7.9 MG/DL (ref 8.5–10.1)
CALCIUM SERPL-MCNC: 8 MG/DL (ref 8.5–10.1)
CALCIUM SERPL-MCNC: 8.1 MG/DL (ref 8.5–10.1)
CALCIUM SERPL-MCNC: 8.2 MG/DL (ref 8.5–10.1)
CALCIUM SERPL-MCNC: 8.3 MG/DL (ref 8.5–10.1)
CALCIUM SERPL-MCNC: 8.3 MG/DL (ref 8.5–10.1)
CALCIUM SERPL-MCNC: 8.4 MG/DL (ref 8.5–10.1)
CALCIUM SERPL-MCNC: 8.5 MG/DL (ref 8.5–10.1)
CALCIUM SERPL-MCNC: 8.5 MG/DL (ref 8.6–10.2)
CALCIUM SERPL-MCNC: 8.6 MG/DL (ref 8.5–10.1)
CALCIUM SERPL-MCNC: 8.6 MG/DL (ref 8.6–10.2)
CALCIUM SERPL-MCNC: 8.7 MG/DL (ref 8.5–10.1)
CALCIUM SERPL-MCNC: 8.8 MG/DL (ref 8.5–10.1)
CALCIUM SERPL-MCNC: 8.9 MG/DL (ref 8.5–10.1)
CALCIUM SERPL-MCNC: 9 MG/DL (ref 8.5–10.1)
CALCIUM SERPL-MCNC: 9 MG/DL (ref 8.5–10.1)
CALCIUM SERPL-MCNC: 9 MG/DL (ref 8.6–10.2)
CALCIUM SERPL-MCNC: 9.1 MG/DL (ref 8.5–10.1)
CALCIUM SERPL-MCNC: 9.1 MG/DL (ref 8.5–10.1)
CALCULATED R AXIS, ECG10: -35 DEGREES
CALCULATED R AXIS, ECG10: 155 DEGREES
CALCULATED R AXIS, ECG10: 91 DEGREES
CALCULATED T AXIS, ECG11: -30 DEGREES
CALCULATED T AXIS, ECG11: 131 DEGREES
CALCULATED T AXIS, ECG11: 147 DEGREES
CEA SERPL-MCNC: 5.4 NG/ML
CHLORIDE SERPL-SCNC: 100 MMOL/L (ref 97–108)
CHLORIDE SERPL-SCNC: 101 MMOL/L (ref 96–106)
CHLORIDE SERPL-SCNC: 101 MMOL/L (ref 97–108)
CHLORIDE SERPL-SCNC: 103 MMOL/L (ref 97–108)
CHLORIDE SERPL-SCNC: 104 MMOL/L (ref 96–106)
CHLORIDE SERPL-SCNC: 104 MMOL/L (ref 97–108)
CHLORIDE SERPL-SCNC: 105 MMOL/L (ref 96–106)
CHLORIDE SERPL-SCNC: 105 MMOL/L (ref 97–108)
CHLORIDE SERPL-SCNC: 106 MMOL/L (ref 97–108)
CHLORIDE SERPL-SCNC: 107 MMOL/L (ref 97–108)
CHLORIDE SERPL-SCNC: 108 MMOL/L (ref 97–108)
CHLORIDE SERPL-SCNC: 109 MMOL/L (ref 97–108)
CHLORIDE SERPL-SCNC: 109 MMOL/L (ref 97–108)
CHLORIDE SERPL-SCNC: 110 MMOL/L (ref 97–108)
CHLORIDE SERPL-SCNC: 96 MMOL/L (ref 97–108)
CHLORIDE SERPL-SCNC: 98 MMOL/L (ref 97–108)
CHLORIDE SERPL-SCNC: 98 MMOL/L (ref 97–108)
CHLORIDE SERPL-SCNC: 99 MMOL/L (ref 97–108)
CO2 SERPL-SCNC: 19 MMOL/L (ref 21–32)
CO2 SERPL-SCNC: 19 MMOL/L (ref 21–32)
CO2 SERPL-SCNC: 21 MMOL/L (ref 20–29)
CO2 SERPL-SCNC: 21 MMOL/L (ref 21–32)
CO2 SERPL-SCNC: 22 MMOL/L (ref 20–29)
CO2 SERPL-SCNC: 22 MMOL/L (ref 21–32)
CO2 SERPL-SCNC: 23 MMOL/L (ref 20–29)
CO2 SERPL-SCNC: 23 MMOL/L (ref 21–32)
CO2 SERPL-SCNC: 24 MMOL/L (ref 21–32)
CO2 SERPL-SCNC: 25 MMOL/L (ref 21–32)
CO2 SERPL-SCNC: 26 MMOL/L (ref 21–32)
CO2 SERPL-SCNC: 27 MMOL/L (ref 21–32)
CO2 SERPL-SCNC: 28 MMOL/L (ref 21–32)
CO2 SERPL-SCNC: 29 MMOL/L (ref 21–32)
CO2 SERPL-SCNC: 30 MMOL/L (ref 21–32)
COLOR UR: ABNORMAL
COMMENT, HOLDF: NORMAL
COPPER SERPL-MCNC: 73 UG/DL (ref 72–166)
CORTIS 1H P CHAL SERPL-MCNC: 35.6 UG/DL
CORTIS 30M P CHAL SERPL-MCNC: 25 UG/DL
CORTIS BS SERPL-MCNC: 15 UG/DL
CREAT SERPL-MCNC: 0.88 MG/DL (ref 0.7–1.3)
CREAT SERPL-MCNC: 0.9 MG/DL (ref 0.7–1.3)
CREAT SERPL-MCNC: 0.9 MG/DL (ref 0.7–1.3)
CREAT SERPL-MCNC: 0.94 MG/DL (ref 0.7–1.3)
CREAT SERPL-MCNC: 0.96 MG/DL (ref 0.7–1.3)
CREAT SERPL-MCNC: 0.97 MG/DL (ref 0.7–1.3)
CREAT SERPL-MCNC: 0.97 MG/DL (ref 0.7–1.3)
CREAT SERPL-MCNC: 0.98 MG/DL (ref 0.7–1.3)
CREAT SERPL-MCNC: 0.99 MG/DL (ref 0.7–1.3)
CREAT SERPL-MCNC: 1 MG/DL (ref 0.7–1.3)
CREAT SERPL-MCNC: 1.02 MG/DL (ref 0.7–1.3)
CREAT SERPL-MCNC: 1.02 MG/DL (ref 0.7–1.3)
CREAT SERPL-MCNC: 1.03 MG/DL (ref 0.7–1.3)
CREAT SERPL-MCNC: 1.03 MG/DL (ref 0.7–1.3)
CREAT SERPL-MCNC: 1.04 MG/DL (ref 0.7–1.3)
CREAT SERPL-MCNC: 1.05 MG/DL (ref 0.7–1.3)
CREAT SERPL-MCNC: 1.05 MG/DL (ref 0.7–1.3)
CREAT SERPL-MCNC: 1.06 MG/DL (ref 0.76–1.27)
CREAT SERPL-MCNC: 1.06 MG/DL (ref 0.7–1.3)
CREAT SERPL-MCNC: 1.07 MG/DL (ref 0.7–1.3)
CREAT SERPL-MCNC: 1.08 MG/DL (ref 0.7–1.3)
CREAT SERPL-MCNC: 1.08 MG/DL (ref 0.7–1.3)
CREAT SERPL-MCNC: 1.09 MG/DL (ref 0.7–1.3)
CREAT SERPL-MCNC: 1.09 MG/DL (ref 0.7–1.3)
CREAT SERPL-MCNC: 1.11 MG/DL (ref 0.76–1.27)
CREAT SERPL-MCNC: 1.11 MG/DL (ref 0.7–1.3)
CREAT SERPL-MCNC: 1.11 MG/DL (ref 0.7–1.3)
CREAT SERPL-MCNC: 1.15 MG/DL (ref 0.7–1.3)
CREAT SERPL-MCNC: 1.15 MG/DL (ref 0.7–1.3)
CREAT SERPL-MCNC: 1.16 MG/DL (ref 0.7–1.3)
CREAT SERPL-MCNC: 1.17 MG/DL (ref 0.7–1.3)
CREAT SERPL-MCNC: 1.18 MG/DL (ref 0.7–1.3)
CREAT SERPL-MCNC: 1.19 MG/DL (ref 0.7–1.3)
CREAT SERPL-MCNC: 1.22 MG/DL (ref 0.7–1.3)
CREAT SERPL-MCNC: 1.24 MG/DL (ref 0.7–1.3)
CREAT SERPL-MCNC: 1.26 MG/DL (ref 0.7–1.3)
CREAT SERPL-MCNC: 1.26 MG/DL (ref 0.7–1.3)
CREAT SERPL-MCNC: 1.27 MG/DL (ref 0.7–1.3)
CREAT SERPL-MCNC: 1.29 MG/DL (ref 0.7–1.3)
CREAT SERPL-MCNC: 1.29 MG/DL (ref 0.7–1.3)
CREAT SERPL-MCNC: 1.3 MG/DL (ref 0.7–1.3)
CREAT SERPL-MCNC: 1.31 MG/DL (ref 0.7–1.3)
CREAT SERPL-MCNC: 1.34 MG/DL (ref 0.7–1.3)
CREAT SERPL-MCNC: 1.34 MG/DL (ref 0.7–1.3)
CREAT SERPL-MCNC: 1.35 MG/DL (ref 0.7–1.3)
CREAT SERPL-MCNC: 1.36 MG/DL (ref 0.7–1.3)
CREAT SERPL-MCNC: 1.4 MG/DL (ref 0.7–1.3)
CREAT SERPL-MCNC: 1.42 MG/DL (ref 0.76–1.27)
CREAT SERPL-MCNC: 1.58 MG/DL (ref 0.7–1.3)
CREAT SERPL-MCNC: 1.61 MG/DL (ref 0.7–1.3)
CREAT SERPL-MCNC: 1.62 MG/DL (ref 0.7–1.3)
CREAT SERPL-MCNC: 1.62 MG/DL (ref 0.7–1.3)
CREAT SERPL-MCNC: 1.68 MG/DL (ref 0.7–1.3)
CREAT SERPL-MCNC: 1.7 MG/DL (ref 0.7–1.3)
CREAT SERPL-MCNC: 1.79 MG/DL (ref 0.7–1.3)
CREAT SERPL-MCNC: 1.86 MG/DL (ref 0.7–1.3)
CREAT SERPL-MCNC: 1.88 MG/DL (ref 0.7–1.3)
CREAT SERPL-MCNC: 1.91 MG/DL (ref 0.7–1.3)
CREAT SERPL-MCNC: 1.99 MG/DL (ref 0.7–1.3)
CREAT SERPL-MCNC: 2.09 MG/DL (ref 0.7–1.3)
CROSSMATCH RESULT,%XM: NORMAL
DAT IGG-SP REAG RBC QL: NORMAL
DAT POLY-SP REAG RBC QL: NORMAL
DIAGNOSIS, 93000: NORMAL
DIFFERENTIAL METHOD BLD: ABNORMAL
DIGOXIN SERPL-MCNC: 0.2 NG/ML (ref 0.9–2)
DIGOXIN SERPL-MCNC: 0.2 NG/ML (ref 0.9–2)
DIGOXIN SERPL-MCNC: 0.3 NG/ML (ref 0.9–2)
DIGOXIN SERPL-MCNC: 0.4 NG/ML (ref 0.9–2)
DIGOXIN SERPL-MCNC: 0.5 NG/ML (ref 0.9–2)
DIGOXIN SERPL-MCNC: 0.6 NG/ML (ref 0.9–2)
DIGOXIN SERPL-MCNC: 0.7 NG/ML (ref 0.9–2)
DIGOXIN SERPL-MCNC: 0.8 NG/ML (ref 0.9–2)
DIGOXIN SERPL-MCNC: 0.9 NG/ML (ref 0.9–2)
DIGOXIN SERPL-MCNC: 1.1 NG/ML (ref 0.9–2)
DIGOXIN SERPL-MCNC: 1.5 NG/ML (ref 0.5–0.9)
DIGOXIN SERPL-MCNC: 1.6 NG/ML (ref 0.9–2)
ECHO AO ROOT DIAM: 3.44 CM
ECHO AO ROOT DIAM: 3.6 CM
ECHO AO ROOT DIAM: 3.95 CM
ECHO AV PEAK GRADIENT: 2.4 MMHG
ECHO AV PEAK VELOCITY: 77.35 CM/S
ECHO EST RA PRESSURE: 15 MMHG
ECHO LA MAJOR AXIS: 4.33 CM
ECHO LA MAJOR AXIS: 4.35 CM
ECHO LA MAJOR AXIS: 4.41 CM
ECHO LA MAJOR AXIS: 4.62 CM
ECHO LA MAJOR AXIS: 4.94 CM
ECHO LA MAJOR AXIS: 5.48 CM
ECHO LA MINOR AXIS: 2.24 CM
ECHO LA MINOR AXIS: 2.64 CM
ECHO LA TO AORTIC ROOT RATIO: 1.25
ECHO LA TO AORTIC ROOT RATIO: 1.26
ECHO LV INTERNAL DIMENSION DIASTOLIC: 5.79 CM (ref 4.2–5.9)
ECHO LV INTERNAL DIMENSION DIASTOLIC: 5.94 CM (ref 4.2–5.9)
ECHO LV INTERNAL DIMENSION DIASTOLIC: 6.82 CM (ref 4.2–5.9)
ECHO LV INTERNAL DIMENSION DIASTOLIC: 6.84 CM (ref 4.2–5.9)
ECHO LV INTERNAL DIMENSION DIASTOLIC: 6.91 CM (ref 4.2–5.9)
ECHO LV INTERNAL DIMENSION DIASTOLIC: 7.1 CM (ref 4.2–5.9)
ECHO LV INTERNAL DIMENSION DIASTOLIC: 7.79 CM (ref 4.2–5.9)
ECHO LV INTERNAL DIMENSION SYSTOLIC: 4.91 CM
ECHO LV INTERNAL DIMENSION SYSTOLIC: 5.05 CM
ECHO LV INTERNAL DIMENSION SYSTOLIC: 5.44 CM
ECHO LV INTERNAL DIMENSION SYSTOLIC: 6.26 CM
ECHO LV INTERNAL DIMENSION SYSTOLIC: 6.31 CM
ECHO LV INTERNAL DIMENSION SYSTOLIC: 6.47 CM
ECHO LV INTERNAL DIMENSION SYSTOLIC: 6.96 CM
ECHO LV IVSD: 0.62 CM (ref 0.6–1)
ECHO LV IVSD: 0.78 CM (ref 0.6–1)
ECHO LV IVSD: 0.82 CM (ref 0.6–1)
ECHO LV IVSD: 0.91 CM (ref 0.6–1)
ECHO LV IVSD: 0.91 CM (ref 0.6–1)
ECHO LV IVSD: 1.12 CM (ref 0.6–1)
ECHO LV MASS 2D: 255.2 G (ref 88–224)
ECHO LV MASS 2D: 283.5 G (ref 88–224)
ECHO LV MASS 2D: 298.9 G (ref 88–224)
ECHO LV MASS 2D: 302.5 G (ref 88–224)
ECHO LV MASS 2D: 305 G (ref 88–224)
ECHO LV MASS 2D: 366.8 G (ref 88–224)
ECHO LV MASS INDEX 2D: 124 G/M2 (ref 49–115)
ECHO LV MASS INDEX 2D: 135 G/M2 (ref 49–115)
ECHO LV MASS INDEX 2D: 144.2 G/M2 (ref 49–115)
ECHO LV MASS INDEX 2D: 147.3 G/M2 (ref 49–115)
ECHO LV MASS INDEX 2D: 148.8 G/M2 (ref 49–115)
ECHO LV MASS INDEX 2D: 180.2 G/M2 (ref 49–115)
ECHO LV POSTERIOR WALL DIASTOLIC: 0.79 CM (ref 0.6–1)
ECHO LV POSTERIOR WALL DIASTOLIC: 0.9 CM (ref 0.6–1)
ECHO LV POSTERIOR WALL DIASTOLIC: 0.93 CM (ref 0.6–1)
ECHO LV POSTERIOR WALL DIASTOLIC: 1.01 CM (ref 0.6–1)
ECHO LV POSTERIOR WALL DIASTOLIC: 1.13 CM (ref 0.6–1)
ECHO LV POSTERIOR WALL DIASTOLIC: 1.25 CM (ref 0.6–1)
ECHO LVOT DIAM: 2.23 CM
ECHO LVOT DIAM: 2.28 CM
ECHO MV A VELOCITY: 41.67 CM/S
ECHO MV AREA PHT: 6.22 CM2
ECHO MV E DECELERATION TIME (DT): 0.12 S
ECHO MV E VELOCITY: 44.08 CM/S
ECHO MV E/A RATIO: 1.06
ECHO MV PRESSURE HALF TIME (PHT): 0.04 S
ECHO PULMONARY ARTERY SYSTOLIC PRESSURE (PASP): 38.3 MMHG
ECHO PV MAX VELOCITY: 100.2 CM/S
ECHO PV MAX VELOCITY: 107.88 CM/S
ECHO PV PEAK GRADIENT: 4 MMHG
ECHO PV PEAK GRADIENT: 4.7 MMHG
ECHO PV PEAK INSTANTANEOUS GRADIENT SYSTOLIC: 4.28 MMHG
ECHO RIGHT VENTRICULAR SYSTOLIC PRESSURE (RVSP): 38.3 MMHG
ECHO RV INTERNAL DIMENSION: 3.06 CM
ECHO RV INTERNAL DIMENSION: 4.37 CM
ECHO RV INTERNAL DIMENSION: 4.55 CM
ECHO RV INTERNAL DIMENSION: 4.67 CM
ECHO RV INTERNAL DIMENSION: 5.16 CM
ECHO RV TAPSE: 1.01 CM (ref 1.5–2)
ECHO RV TAPSE: 1.15 CM (ref 1.5–2)
ECHO RV TAPSE: 1.28 CM (ref 1.5–2)
ECHO RV TAPSE: 1.29 CM (ref 1.5–2)
ECHO RV TAPSE: 1.39 CM (ref 1.5–2)
ECHO RV TAPSE: 1.6 CM (ref 1.5–2)
ECHO TV REGURGITANT MAX VELOCITY: 193.6 CM/S
ECHO TV REGURGITANT MAX VELOCITY: 201.7 CM/S
ECHO TV REGURGITANT MAX VELOCITY: 234.54 CM/S
ECHO TV REGURGITANT MAX VELOCITY: 241.58 CM/S
ECHO TV REGURGITANT MAX VELOCITY: 270.15 CM/S
ECHO TV REGURGITANT MAX VELOCITY: 305.97 CM/S
ECHO TV REGURGITANT MAX VELOCITY: 316.63 CM/S
ECHO TV REGURGITANT PEAK GRADIENT: 15 MMHG
ECHO TV REGURGITANT PEAK GRADIENT: 16.27 MMHG
ECHO TV REGURGITANT PEAK GRADIENT: 22 MMHG
ECHO TV REGURGITANT PEAK GRADIENT: 23.3 MMHG
ECHO TV REGURGITANT PEAK GRADIENT: 29.2 MMHG
ECHO TV REGURGITANT PEAK GRADIENT: 37.4 MMHG
ECHO TV REGURGITANT PEAK GRADIENT: 40.1 MMHG
EOSINOPHIL # BLD: 0 K/UL (ref 0–0.4)
EOSINOPHIL # BLD: 0 K/UL (ref 0–0.4)
EOSINOPHIL # BLD: 0.1 K/UL (ref 0–0.4)
EOSINOPHIL # BLD: 0.2 K/UL (ref 0–0.4)
EOSINOPHIL # BLD: 0.2 K/UL (ref 0–0.4)
EOSINOPHIL NFR BLD: 0 % (ref 0–7)
EOSINOPHIL NFR BLD: 0 % (ref 0–7)
EOSINOPHIL NFR BLD: 1 % (ref 0–7)
EOSINOPHIL NFR BLD: 1 % (ref 0–7)
EOSINOPHIL NFR BLD: 2 % (ref 0–7)
EOSINOPHIL NFR BLD: 3 % (ref 0–7)
EOSINOPHIL NFR BLD: 4 % (ref 0–7)
EPITH CASTS URNS QL MICRO: ABNORMAL /LPF
ERYTHROCYTE [DISTWIDTH] IN BLOOD BY AUTOMATED COUNT: 14.9 % (ref 11.5–14.5)
ERYTHROCYTE [DISTWIDTH] IN BLOOD BY AUTOMATED COUNT: 15.1 % (ref 11.5–14.5)
ERYTHROCYTE [DISTWIDTH] IN BLOOD BY AUTOMATED COUNT: 15.1 % (ref 11.6–15.4)
ERYTHROCYTE [DISTWIDTH] IN BLOOD BY AUTOMATED COUNT: 15.5 % (ref 11.5–14.5)
ERYTHROCYTE [DISTWIDTH] IN BLOOD BY AUTOMATED COUNT: 15.6 % (ref 11.5–14.5)
ERYTHROCYTE [DISTWIDTH] IN BLOOD BY AUTOMATED COUNT: 15.7 % (ref 11.5–14.5)
ERYTHROCYTE [DISTWIDTH] IN BLOOD BY AUTOMATED COUNT: 15.8 % (ref 11.5–14.5)
ERYTHROCYTE [DISTWIDTH] IN BLOOD BY AUTOMATED COUNT: 15.9 % (ref 11.5–14.5)
ERYTHROCYTE [DISTWIDTH] IN BLOOD BY AUTOMATED COUNT: 16.1 % (ref 11.5–14.5)
ERYTHROCYTE [DISTWIDTH] IN BLOOD BY AUTOMATED COUNT: 16.3 % (ref 11.5–14.5)
ERYTHROCYTE [DISTWIDTH] IN BLOOD BY AUTOMATED COUNT: 16.3 % (ref 11.5–14.5)
ERYTHROCYTE [DISTWIDTH] IN BLOOD BY AUTOMATED COUNT: 16.3 % (ref 11.6–15.4)
ERYTHROCYTE [DISTWIDTH] IN BLOOD BY AUTOMATED COUNT: 16.4 % (ref 11.5–14.5)
ERYTHROCYTE [DISTWIDTH] IN BLOOD BY AUTOMATED COUNT: 16.5 % (ref 11.5–14.5)
ERYTHROCYTE [DISTWIDTH] IN BLOOD BY AUTOMATED COUNT: 16.6 % (ref 11.5–14.5)
ERYTHROCYTE [DISTWIDTH] IN BLOOD BY AUTOMATED COUNT: 16.7 % (ref 11.5–14.5)
ERYTHROCYTE [DISTWIDTH] IN BLOOD BY AUTOMATED COUNT: 16.8 % (ref 11.5–14.5)
ERYTHROCYTE [DISTWIDTH] IN BLOOD BY AUTOMATED COUNT: 17 % (ref 11.6–15.4)
ERYTHROCYTE [DISTWIDTH] IN BLOOD BY AUTOMATED COUNT: 17.1 % (ref 11.5–14.5)
ERYTHROCYTE [DISTWIDTH] IN BLOOD BY AUTOMATED COUNT: 17.2 % (ref 11.5–14.5)
ERYTHROCYTE [DISTWIDTH] IN BLOOD BY AUTOMATED COUNT: 17.2 % (ref 11.5–14.5)
ERYTHROCYTE [DISTWIDTH] IN BLOOD BY AUTOMATED COUNT: 17.4 % (ref 11.5–14.5)
ERYTHROCYTE [DISTWIDTH] IN BLOOD BY AUTOMATED COUNT: 17.4 % (ref 11.5–14.5)
ERYTHROCYTE [DISTWIDTH] IN BLOOD BY AUTOMATED COUNT: 17.8 % (ref 11.5–14.5)
ERYTHROCYTE [DISTWIDTH] IN BLOOD BY AUTOMATED COUNT: 17.9 % (ref 11.5–14.5)
ERYTHROCYTE [DISTWIDTH] IN BLOOD BY AUTOMATED COUNT: 18 % (ref 11.5–14.5)
ERYTHROCYTE [DISTWIDTH] IN BLOOD BY AUTOMATED COUNT: 18.1 % (ref 11.5–14.5)
ERYTHROCYTE [DISTWIDTH] IN BLOOD BY AUTOMATED COUNT: 18.2 % (ref 11.5–14.5)
ERYTHROCYTE [DISTWIDTH] IN BLOOD BY AUTOMATED COUNT: 18.3 % (ref 11.5–14.5)
ERYTHROCYTE [DISTWIDTH] IN BLOOD BY AUTOMATED COUNT: 18.7 % (ref 11.5–14.5)
ERYTHROCYTE [DISTWIDTH] IN BLOOD BY AUTOMATED COUNT: 19.2 % (ref 11.5–14.5)
ERYTHROCYTE [DISTWIDTH] IN BLOOD BY AUTOMATED COUNT: 19.5 % (ref 11.5–14.5)
ERYTHROCYTE [DISTWIDTH] IN BLOOD BY AUTOMATED COUNT: 19.8 % (ref 11.5–14.5)
ERYTHROCYTE [DISTWIDTH] IN BLOOD BY AUTOMATED COUNT: 19.9 % (ref 11.5–14.5)
ERYTHROCYTE [DISTWIDTH] IN BLOOD BY AUTOMATED COUNT: 20 % (ref 11.5–14.5)
ERYTHROCYTE [DISTWIDTH] IN BLOOD BY AUTOMATED COUNT: 20.1 % (ref 11.5–14.5)
ERYTHROCYTE [DISTWIDTH] IN BLOOD BY AUTOMATED COUNT: 20.1 % (ref 11.5–14.5)
ERYTHROCYTE [DISTWIDTH] IN BLOOD BY AUTOMATED COUNT: 20.2 % (ref 11.5–14.5)
ERYTHROCYTE [DISTWIDTH] IN BLOOD BY AUTOMATED COUNT: 20.7 % (ref 11.5–14.5)
ERYTHROCYTE [DISTWIDTH] IN BLOOD BY AUTOMATED COUNT: 20.8 % (ref 11.5–14.5)
ERYTHROCYTE [DISTWIDTH] IN BLOOD BY AUTOMATED COUNT: 20.8 % (ref 11.5–14.5)
ERYTHROCYTE [DISTWIDTH] IN BLOOD BY AUTOMATED COUNT: 20.9 % (ref 11.5–14.5)
ERYTHROCYTE [DISTWIDTH] IN BLOOD BY AUTOMATED COUNT: 20.9 % (ref 11.5–14.5)
ERYTHROCYTE [DISTWIDTH] IN BLOOD BY AUTOMATED COUNT: 21 % (ref 11.5–14.5)
ERYTHROCYTE [DISTWIDTH] IN BLOOD BY AUTOMATED COUNT: 21.1 % (ref 11.5–14.5)
ERYTHROCYTE [DISTWIDTH] IN BLOOD BY AUTOMATED COUNT: 21.1 % (ref 11.5–14.5)
ERYTHROCYTE [DISTWIDTH] IN BLOOD BY AUTOMATED COUNT: 21.2 % (ref 11.5–14.5)
ERYTHROCYTE [DISTWIDTH] IN BLOOD BY AUTOMATED COUNT: 21.2 % (ref 11.5–14.5)
ERYTHROCYTE [DISTWIDTH] IN BLOOD BY AUTOMATED COUNT: 21.5 % (ref 11.5–14.5)
ERYTHROCYTE [SEDIMENTATION RATE] IN BLOOD: 109 MM/HR (ref 0–20)
ERYTHROCYTE [SEDIMENTATION RATE] IN BLOOD: 22 MM/HR (ref 0–20)
ERYTHROCYTE [SEDIMENTATION RATE] IN BLOOD: 70 MM/HR (ref 0–20)
ERYTHROCYTE [SEDIMENTATION RATE] IN BLOOD: 71 MM/HR (ref 0–20)
ERYTHROCYTE [SEDIMENTATION RATE] IN BLOOD: 72 MM/HR (ref 0–20)
ERYTHROCYTE [SEDIMENTATION RATE] IN BLOOD: 73 MM/HR (ref 0–20)
ERYTHROCYTE [SEDIMENTATION RATE] IN BLOOD: 87 MM/HR (ref 0–20)
FERRITIN SERPL-MCNC: 126 NG/ML (ref 26–388)
FERRITIN SERPL-MCNC: 222 NG/ML (ref 26–388)
FERRITIN SERPL-MCNC: 344 NG/ML (ref 26–388)
FERRITIN SERPL-MCNC: 511 NG/ML (ref 26–388)
FERRITIN SERPL-MCNC: 974 NG/ML (ref 26–388)
FOLATE SERPL-MCNC: 16.5 NG/ML (ref 5–21)
GAS FLOW.O2 O2 DELIVERY SYS: ABNORMAL L/MIN
GLOBULIN SER CALC-MCNC: 2.9 G/DL (ref 1.5–4.5)
GLOBULIN SER CALC-MCNC: 3 G/DL (ref 1.5–4.5)
GLOBULIN SER CALC-MCNC: 3.2 G/DL (ref 2–4)
GLOBULIN SER CALC-MCNC: 3.3 G/DL (ref 2–4)
GLOBULIN SER CALC-MCNC: 3.4 G/DL (ref 2–4)
GLOBULIN SER CALC-MCNC: 3.4 G/DL (ref 2–4)
GLOBULIN SER CALC-MCNC: 3.5 G/DL (ref 2–4)
GLOBULIN SER CALC-MCNC: 3.6 G/DL (ref 1.5–4.5)
GLOBULIN SER CALC-MCNC: 3.6 G/DL (ref 2–4)
GLOBULIN SER CALC-MCNC: 3.7 G/DL (ref 2–4)
GLOBULIN SER CALC-MCNC: 3.8 G/DL (ref 2–4)
GLOBULIN SER CALC-MCNC: 3.9 G/DL (ref 2–4)
GLOBULIN SER CALC-MCNC: 4 G/DL (ref 2–4)
GLOBULIN SER CALC-MCNC: 4.1 G/DL (ref 2–4)
GLOBULIN SER CALC-MCNC: 4.2 G/DL (ref 2–4)
GLOBULIN SER CALC-MCNC: 4.3 G/DL (ref 2–4)
GLOBULIN SER CALC-MCNC: 4.4 G/DL (ref 2–4)
GLOBULIN SER CALC-MCNC: 4.5 G/DL (ref 2–4)
GLOBULIN SER CALC-MCNC: 4.7 G/DL (ref 2–4)
GLOBULIN SER CALC-MCNC: 4.8 G/DL (ref 2–4)
GLOBULIN SER CALC-MCNC: 4.8 G/DL (ref 2–4)
GLUCOSE BLD STRIP.AUTO-MCNC: 101 MG/DL (ref 65–100)
GLUCOSE BLD STRIP.AUTO-MCNC: 103 MG/DL (ref 65–100)
GLUCOSE BLD STRIP.AUTO-MCNC: 106 MG/DL (ref 65–100)
GLUCOSE BLD STRIP.AUTO-MCNC: 106 MG/DL (ref 65–100)
GLUCOSE BLD STRIP.AUTO-MCNC: 109 MG/DL (ref 65–100)
GLUCOSE BLD STRIP.AUTO-MCNC: 110 MG/DL (ref 65–100)
GLUCOSE BLD STRIP.AUTO-MCNC: 112 MG/DL (ref 65–100)
GLUCOSE BLD STRIP.AUTO-MCNC: 114 MG/DL (ref 65–100)
GLUCOSE BLD STRIP.AUTO-MCNC: 115 MG/DL (ref 65–100)
GLUCOSE BLD STRIP.AUTO-MCNC: 116 MG/DL (ref 65–100)
GLUCOSE BLD STRIP.AUTO-MCNC: 117 MG/DL (ref 65–100)
GLUCOSE BLD STRIP.AUTO-MCNC: 117 MG/DL (ref 65–100)
GLUCOSE BLD STRIP.AUTO-MCNC: 119 MG/DL (ref 65–100)
GLUCOSE BLD STRIP.AUTO-MCNC: 122 MG/DL (ref 65–100)
GLUCOSE BLD STRIP.AUTO-MCNC: 124 MG/DL (ref 65–100)
GLUCOSE BLD STRIP.AUTO-MCNC: 128 MG/DL (ref 65–100)
GLUCOSE BLD STRIP.AUTO-MCNC: 129 MG/DL (ref 65–100)
GLUCOSE BLD STRIP.AUTO-MCNC: 131 MG/DL (ref 65–100)
GLUCOSE BLD STRIP.AUTO-MCNC: 132 MG/DL (ref 65–100)
GLUCOSE BLD STRIP.AUTO-MCNC: 137 MG/DL (ref 65–100)
GLUCOSE BLD STRIP.AUTO-MCNC: 138 MG/DL (ref 65–100)
GLUCOSE BLD STRIP.AUTO-MCNC: 139 MG/DL (ref 65–100)
GLUCOSE BLD STRIP.AUTO-MCNC: 151 MG/DL (ref 65–100)
GLUCOSE BLD STRIP.AUTO-MCNC: 152 MG/DL (ref 65–100)
GLUCOSE BLD STRIP.AUTO-MCNC: 159 MG/DL (ref 65–100)
GLUCOSE BLD STRIP.AUTO-MCNC: 93 MG/DL (ref 65–100)
GLUCOSE BLD STRIP.AUTO-MCNC: 95 MG/DL (ref 65–100)
GLUCOSE BLD STRIP.AUTO-MCNC: 96 MG/DL (ref 65–100)
GLUCOSE BLD STRIP.AUTO-MCNC: 98 MG/DL (ref 65–100)
GLUCOSE BLD STRIP.AUTO-MCNC: 99 MG/DL (ref 65–100)
GLUCOSE BLD STRIP.AUTO-MCNC: 99 MG/DL (ref 65–100)
GLUCOSE SERPL-MCNC: 100 MG/DL (ref 65–100)
GLUCOSE SERPL-MCNC: 100 MG/DL (ref 65–100)
GLUCOSE SERPL-MCNC: 102 MG/DL (ref 65–100)
GLUCOSE SERPL-MCNC: 102 MG/DL (ref 65–100)
GLUCOSE SERPL-MCNC: 102 MG/DL (ref 65–99)
GLUCOSE SERPL-MCNC: 103 MG/DL (ref 65–100)
GLUCOSE SERPL-MCNC: 104 MG/DL (ref 65–100)
GLUCOSE SERPL-MCNC: 105 MG/DL (ref 65–100)
GLUCOSE SERPL-MCNC: 105 MG/DL (ref 65–100)
GLUCOSE SERPL-MCNC: 106 MG/DL (ref 65–100)
GLUCOSE SERPL-MCNC: 107 MG/DL (ref 65–100)
GLUCOSE SERPL-MCNC: 107 MG/DL (ref 65–100)
GLUCOSE SERPL-MCNC: 108 MG/DL (ref 65–100)
GLUCOSE SERPL-MCNC: 108 MG/DL (ref 65–100)
GLUCOSE SERPL-MCNC: 110 MG/DL (ref 65–100)
GLUCOSE SERPL-MCNC: 113 MG/DL (ref 65–100)
GLUCOSE SERPL-MCNC: 114 MG/DL (ref 65–100)
GLUCOSE SERPL-MCNC: 114 MG/DL (ref 65–100)
GLUCOSE SERPL-MCNC: 120 MG/DL (ref 65–100)
GLUCOSE SERPL-MCNC: 121 MG/DL (ref 65–100)
GLUCOSE SERPL-MCNC: 133 MG/DL (ref 65–100)
GLUCOSE SERPL-MCNC: 81 MG/DL (ref 65–99)
GLUCOSE SERPL-MCNC: 82 MG/DL (ref 65–100)
GLUCOSE SERPL-MCNC: 83 MG/DL (ref 65–100)
GLUCOSE SERPL-MCNC: 84 MG/DL (ref 65–100)
GLUCOSE SERPL-MCNC: 85 MG/DL (ref 65–99)
GLUCOSE SERPL-MCNC: 87 MG/DL (ref 65–100)
GLUCOSE SERPL-MCNC: 88 MG/DL (ref 65–100)
GLUCOSE SERPL-MCNC: 89 MG/DL (ref 65–100)
GLUCOSE SERPL-MCNC: 90 MG/DL (ref 65–100)
GLUCOSE SERPL-MCNC: 91 MG/DL (ref 65–100)
GLUCOSE SERPL-MCNC: 92 MG/DL (ref 65–100)
GLUCOSE SERPL-MCNC: 92 MG/DL (ref 65–100)
GLUCOSE SERPL-MCNC: 93 MG/DL (ref 65–100)
GLUCOSE SERPL-MCNC: 93 MG/DL (ref 65–100)
GLUCOSE SERPL-MCNC: 94 MG/DL (ref 65–100)
GLUCOSE SERPL-MCNC: 95 MG/DL (ref 65–100)
GLUCOSE SERPL-MCNC: 96 MG/DL (ref 65–100)
GLUCOSE SERPL-MCNC: 97 MG/DL (ref 65–100)
GLUCOSE SERPL-MCNC: 98 MG/DL (ref 65–100)
GLUCOSE SERPL-MCNC: 98 MG/DL (ref 65–100)
GLUCOSE SERPL-MCNC: 99 MG/DL (ref 65–100)
GLUCOSE UR STRIP.AUTO-MCNC: NEGATIVE MG/DL
GRAM STN SPEC: ABNORMAL
GRAM STN SPEC: NORMAL
GRAN CASTS URNS QL MICRO: ABNORMAL /LPF
GRAN CASTS URNS QL MICRO: ABNORMAL /LPF
HCO3 BLD-SCNC: 18.7 MMOL/L (ref 22–26)
HCT VFR BLD AUTO: 18.8 % (ref 36.6–50.3)
HCT VFR BLD AUTO: 20.5 % (ref 36.6–50.3)
HCT VFR BLD AUTO: 21.8 % (ref 36.6–50.3)
HCT VFR BLD AUTO: 22.2 % (ref 36.6–50.3)
HCT VFR BLD AUTO: 22.3 % (ref 36.6–50.3)
HCT VFR BLD AUTO: 23.2 % (ref 36.6–50.3)
HCT VFR BLD AUTO: 23.6 % (ref 36.6–50.3)
HCT VFR BLD AUTO: 23.6 % (ref 36.6–50.3)
HCT VFR BLD AUTO: 23.7 % (ref 36.6–50.3)
HCT VFR BLD AUTO: 23.9 % (ref 36.6–50.3)
HCT VFR BLD AUTO: 24 % (ref 36.6–50.3)
HCT VFR BLD AUTO: 24.2 % (ref 36.6–50.3)
HCT VFR BLD AUTO: 24.3 % (ref 36.6–50.3)
HCT VFR BLD AUTO: 24.6 % (ref 36.6–50.3)
HCT VFR BLD AUTO: 24.6 % (ref 36.6–50.3)
HCT VFR BLD AUTO: 24.7 % (ref 36.6–50.3)
HCT VFR BLD AUTO: 24.7 % (ref 37.5–51)
HCT VFR BLD AUTO: 25 % (ref 36.6–50.3)
HCT VFR BLD AUTO: 25.1 % (ref 36.6–50.3)
HCT VFR BLD AUTO: 25.1 % (ref 36.6–50.3)
HCT VFR BLD AUTO: 25.3 % (ref 36.6–50.3)
HCT VFR BLD AUTO: 25.4 % (ref 36.6–50.3)
HCT VFR BLD AUTO: 25.5 % (ref 36.6–50.3)
HCT VFR BLD AUTO: 26 % (ref 36.6–50.3)
HCT VFR BLD AUTO: 26.2 % (ref 36.6–50.3)
HCT VFR BLD AUTO: 26.2 % (ref 36.6–50.3)
HCT VFR BLD AUTO: 26.5 % (ref 36.6–50.3)
HCT VFR BLD AUTO: 26.8 % (ref 36.6–50.3)
HCT VFR BLD AUTO: 26.9 % (ref 36.6–50.3)
HCT VFR BLD AUTO: 27.2 % (ref 36.6–50.3)
HCT VFR BLD AUTO: 27.2 % (ref 36.6–50.3)
HCT VFR BLD AUTO: 28 % (ref 36.6–50.3)
HCT VFR BLD AUTO: 28.1 % (ref 36.6–50.3)
HCT VFR BLD AUTO: 28.2 % (ref 36.6–50.3)
HCT VFR BLD AUTO: 28.3 % (ref 36.6–50.3)
HCT VFR BLD AUTO: 28.4 % (ref 36.6–50.3)
HCT VFR BLD AUTO: 28.4 % (ref 36.6–50.3)
HCT VFR BLD AUTO: 28.5 % (ref 36.6–50.3)
HCT VFR BLD AUTO: 28.7 % (ref 36.6–50.3)
HCT VFR BLD AUTO: 29.2 % (ref 36.6–50.3)
HCT VFR BLD AUTO: 29.3 % (ref 36.6–50.3)
HCT VFR BLD AUTO: 29.3 % (ref 36.6–50.3)
HCT VFR BLD AUTO: 29.3 % (ref 37.5–51)
HCT VFR BLD AUTO: 29.4 % (ref 36.6–50.3)
HCT VFR BLD AUTO: 29.5 % (ref 36.6–50.3)
HCT VFR BLD AUTO: 29.6 % (ref 36.6–50.3)
HCT VFR BLD AUTO: 29.7 % (ref 36.6–50.3)
HCT VFR BLD AUTO: 29.8 % (ref 36.6–50.3)
HCT VFR BLD AUTO: 29.9 % (ref 36.6–50.3)
HCT VFR BLD AUTO: 30 % (ref 36.6–50.3)
HCT VFR BLD AUTO: 30.4 % (ref 36.6–50.3)
HCT VFR BLD AUTO: 30.5 % (ref 36.6–50.3)
HCT VFR BLD AUTO: 30.8 % (ref 36.6–50.3)
HCT VFR BLD AUTO: 31 % (ref 36.6–50.3)
HCT VFR BLD AUTO: 32 % (ref 36.6–50.3)
HCT VFR BLD AUTO: 32.2 % (ref 36.6–50.3)
HCT VFR BLD AUTO: 32.5 % (ref 36.6–50.3)
HCT VFR BLD AUTO: 32.7 % (ref 36.6–50.3)
HCT VFR BLD AUTO: 32.9 % (ref 37.5–51)
HCT VFR BLD AUTO: 33 % (ref 36.6–50.3)
HCT VFR BLD AUTO: 33 % (ref 36.6–50.3)
HCT VFR BLD AUTO: 33.1 % (ref 36.6–50.3)
HCT VFR BLD AUTO: 33.5 % (ref 36.6–50.3)
HCT VFR BLD AUTO: 33.8 % (ref 36.6–50.3)
HCT VFR BLD AUTO: 34.5 % (ref 36.6–50.3)
HCT VFR BLD AUTO: 36.1 % (ref 36.6–50.3)
HEMOCCULT STL QL: NEGATIVE
HEMOCCULT STL QL: NEGATIVE
HGB BLD-MCNC: 10.1 G/DL (ref 12.1–17)
HGB BLD-MCNC: 10.3 G/DL (ref 12.1–17)
HGB BLD-MCNC: 10.5 G/DL (ref 13–17.7)
HGB BLD-MCNC: 10.6 G/DL (ref 12.1–17)
HGB BLD-MCNC: 10.8 G/DL (ref 12.1–17)
HGB BLD-MCNC: 5.7 G/DL (ref 12.1–17)
HGB BLD-MCNC: 6.2 G/DL (ref 12.1–17)
HGB BLD-MCNC: 6.7 G/DL (ref 12.1–17)
HGB BLD-MCNC: 7.2 G/DL (ref 12.1–17)
HGB BLD-MCNC: 7.3 G/DL (ref 12.1–17)
HGB BLD-MCNC: 7.4 G/DL (ref 12.1–17)
HGB BLD-MCNC: 7.5 G/DL (ref 12.1–17)
HGB BLD-MCNC: 7.6 G/DL (ref 12.1–17)
HGB BLD-MCNC: 7.7 G/DL (ref 12.1–17)
HGB BLD-MCNC: 7.7 G/DL (ref 12.1–17)
HGB BLD-MCNC: 7.8 G/DL (ref 12.1–17)
HGB BLD-MCNC: 7.8 G/DL (ref 12.1–17)
HGB BLD-MCNC: 7.9 G/DL (ref 12.1–17)
HGB BLD-MCNC: 7.9 G/DL (ref 13–17.7)
HGB BLD-MCNC: 8 G/DL (ref 12.1–17)
HGB BLD-MCNC: 8.2 G/DL (ref 12.1–17)
HGB BLD-MCNC: 8.3 G/DL (ref 12.1–17)
HGB BLD-MCNC: 8.4 G/DL (ref 12.1–17)
HGB BLD-MCNC: 8.4 G/DL (ref 12.1–17)
HGB BLD-MCNC: 8.5 G/DL (ref 12.1–17)
HGB BLD-MCNC: 8.5 G/DL (ref 12.1–17)
HGB BLD-MCNC: 8.6 G/DL (ref 12.1–17)
HGB BLD-MCNC: 8.6 G/DL (ref 12.1–17)
HGB BLD-MCNC: 8.7 G/DL (ref 12.1–17)
HGB BLD-MCNC: 8.7 G/DL (ref 12.1–17)
HGB BLD-MCNC: 8.8 G/DL (ref 12.1–17)
HGB BLD-MCNC: 8.8 G/DL (ref 12.1–17)
HGB BLD-MCNC: 8.9 G/DL (ref 12.1–17)
HGB BLD-MCNC: 9 G/DL (ref 12.1–17)
HGB BLD-MCNC: 9 G/DL (ref 12.1–17)
HGB BLD-MCNC: 9.1 G/DL (ref 12.1–17)
HGB BLD-MCNC: 9.1 G/DL (ref 12.1–17)
HGB BLD-MCNC: 9.2 G/DL (ref 12.1–17)
HGB BLD-MCNC: 9.3 G/DL (ref 12.1–17)
HGB BLD-MCNC: 9.4 G/DL (ref 12.1–17)
HGB BLD-MCNC: 9.5 G/DL (ref 12.1–17)
HGB BLD-MCNC: 9.6 G/DL (ref 12.1–17)
HGB BLD-MCNC: 9.6 G/DL (ref 12.1–17)
HGB BLD-MCNC: 9.7 G/DL (ref 12.1–17)
HGB BLD-MCNC: 9.8 G/DL (ref 12.1–17)
HGB BLD-MCNC: 9.8 G/DL (ref 13–17.7)
HGB UR QL STRIP: ABNORMAL
HYALINE CASTS URNS QL MICRO: ABNORMAL /LPF (ref 0–5)
HYALINE CASTS URNS QL MICRO: ABNORMAL /LPF (ref 0–5)
IMM GRANULOCYTES # BLD AUTO: 0 K/UL
IMM GRANULOCYTES # BLD AUTO: 0 K/UL (ref 0–0.04)
IMM GRANULOCYTES # BLD AUTO: 0.1 K/UL (ref 0–0.04)
IMM GRANULOCYTES NFR BLD AUTO: 0 %
IMM GRANULOCYTES NFR BLD AUTO: 0 % (ref 0–0.5)
IMM GRANULOCYTES NFR BLD AUTO: 1 % (ref 0–0.5)
IMM GRANULOCYTES NFR BLD AUTO: 1 % (ref 0–0.5)
INR PPP: 1.1 (ref 0.9–1.1)
INR PPP: 1.1 (ref 0.9–1.1)
INR PPP: 1.2 (ref 0.8–1.2)
INR PPP: 1.2 (ref 0.9–1.1)
INR PPP: 1.3 (ref 0.9–1.1)
INR PPP: 1.4 (ref 0.9–1.1)
INR PPP: 1.5 (ref 0.9–1.1)
INR PPP: 1.6 (ref 0.9–1.1)
INR PPP: 1.7 (ref 0.9–1.1)
INR PPP: 1.7 (ref 0.9–1.1)
INR PPP: 1.8 (ref 0.9–1.1)
INR PPP: 1.9 (ref 0.9–1.1)
INR PPP: 2 (ref 0.9–1.1)
INR PPP: 2.1 (ref 0.9–1.1)
INR PPP: 2.3 (ref 0.9–1.1)
INR PPP: 2.6 (ref 0.9–1.1)
INR, EXTERNAL: 1.3
INR, EXTERNAL: 1.3
INR, EXTERNAL: 1.4
INR, EXTERNAL: 1.5
INR, EXTERNAL: 1.6
INR, EXTERNAL: 1.7
INR, EXTERNAL: 1.8
INR, EXTERNAL: 1.9
INR, EXTERNAL: 2
INR, EXTERNAL: 2.1
INR, EXTERNAL: 2.4
INR, EXTERNAL: 2.4
IRON SATN MFR SERPL: 10 % (ref 20–50)
IRON SATN MFR SERPL: 12 % (ref 20–50)
IRON SATN MFR SERPL: 23 % (ref 20–50)
IRON SATN MFR SERPL: 33 % (ref 20–50)
IRON SATN MFR SERPL: 58 % (ref 20–50)
IRON SERPL-MCNC: 17 UG/DL (ref 35–150)
IRON SERPL-MCNC: 19 UG/DL (ref 35–150)
IRON SERPL-MCNC: 37 UG/DL (ref 35–150)
IRON SERPL-MCNC: 52 UG/DL (ref 35–150)
IRON SERPL-MCNC: 84 UG/DL (ref 35–150)
KETONES UR QL STRIP.AUTO: NEGATIVE MG/DL
LACTATE SERPL-SCNC: 0.5 MMOL/L (ref 0.4–2)
LACTATE SERPL-SCNC: 0.6 MMOL/L (ref 0.4–2)
LACTATE SERPL-SCNC: 0.7 MMOL/L (ref 0.4–2)
LACTATE SERPL-SCNC: 0.8 MMOL/L (ref 0.4–2)
LACTATE SERPL-SCNC: 0.9 MMOL/L (ref 0.4–2)
LACTATE SERPL-SCNC: 1 MMOL/L (ref 0.4–2)
LACTATE SERPL-SCNC: 1.1 MMOL/L (ref 0.4–2)
LACTATE SERPL-SCNC: 1.2 MMOL/L (ref 0.4–2)
LACTATE SERPL-SCNC: 1.2 MMOL/L (ref 0.4–2)
LACTATE SERPL-SCNC: 1.3 MMOL/L (ref 0.4–2)
LACTATE SERPL-SCNC: 1.5 MMOL/L (ref 0.4–2)
LACTATE SERPL-SCNC: 1.5 MMOL/L (ref 0.4–2)
LACTATE SERPL-SCNC: 1.6 MMOL/L (ref 0.4–2)
LACTATE SERPL-SCNC: 1.8 MMOL/L (ref 0.4–2)
LACTATE SERPL-SCNC: 2.6 MMOL/L (ref 0.4–2)
LDH SERPL L TO P-CCNC: 162 U/L (ref 85–241)
LDH SERPL L TO P-CCNC: 165 U/L (ref 85–241)
LDH SERPL L TO P-CCNC: 166 U/L (ref 85–241)
LDH SERPL L TO P-CCNC: 167 U/L (ref 85–241)
LDH SERPL L TO P-CCNC: 167 U/L (ref 85–241)
LDH SERPL L TO P-CCNC: 170 U/L (ref 85–241)
LDH SERPL L TO P-CCNC: 171 U/L (ref 85–241)
LDH SERPL L TO P-CCNC: 174 U/L (ref 85–241)
LDH SERPL L TO P-CCNC: 175 U/L (ref 85–241)
LDH SERPL L TO P-CCNC: 182 U/L (ref 85–241)
LDH SERPL L TO P-CCNC: 184 U/L (ref 85–241)
LDH SERPL L TO P-CCNC: 185 U/L (ref 85–241)
LDH SERPL L TO P-CCNC: 186 U/L (ref 85–241)
LDH SERPL L TO P-CCNC: 186 U/L (ref 85–241)
LDH SERPL L TO P-CCNC: 191 U/L (ref 85–241)
LDH SERPL L TO P-CCNC: 192 U/L (ref 85–241)
LDH SERPL L TO P-CCNC: 192 U/L (ref 85–241)
LDH SERPL L TO P-CCNC: 193 U/L (ref 85–241)
LDH SERPL L TO P-CCNC: 193 U/L (ref 85–241)
LDH SERPL L TO P-CCNC: 195 U/L (ref 85–241)
LDH SERPL L TO P-CCNC: 196 U/L (ref 85–241)
LDH SERPL L TO P-CCNC: 198 U/L (ref 85–241)
LDH SERPL L TO P-CCNC: 200 U/L (ref 85–241)
LDH SERPL L TO P-CCNC: 202 U/L (ref 85–241)
LDH SERPL L TO P-CCNC: 203 U/L (ref 85–241)
LDH SERPL L TO P-CCNC: 204 U/L (ref 85–241)
LDH SERPL L TO P-CCNC: 205 U/L (ref 85–241)
LDH SERPL L TO P-CCNC: 206 U/L (ref 85–241)
LDH SERPL L TO P-CCNC: 207 U/L (ref 85–241)
LDH SERPL L TO P-CCNC: 209 U/L (ref 85–241)
LDH SERPL L TO P-CCNC: 209 U/L (ref 85–241)
LDH SERPL L TO P-CCNC: 212 U/L (ref 85–241)
LDH SERPL L TO P-CCNC: 213 U/L (ref 85–241)
LDH SERPL L TO P-CCNC: 215 U/L (ref 85–241)
LDH SERPL L TO P-CCNC: 218 U/L (ref 85–241)
LDH SERPL L TO P-CCNC: 223 U/L (ref 85–241)
LDH SERPL L TO P-CCNC: 224 U/L (ref 85–241)
LDH SERPL L TO P-CCNC: 224 U/L (ref 85–241)
LDH SERPL L TO P-CCNC: 236 U/L (ref 85–241)
LDH SERPL L TO P-CCNC: 245 U/L (ref 85–241)
LDH SERPL L TO P-CCNC: 251 U/L (ref 85–241)
LDH SERPL L TO P-CCNC: 252 U/L (ref 85–241)
LDH SERPL L TO P-CCNC: 253 U/L (ref 85–241)
LDH SERPL L TO P-CCNC: 274 U/L (ref 85–241)
LDH SERPL L TO P-CCNC: 280 U/L (ref 85–241)
LDH SERPL L TO P-CCNC: 319 U/L (ref 85–241)
LDH SERPL L TO P-CCNC: 323 U/L (ref 85–241)
LDH SERPL-CCNC: 252 IU/L (ref 121–224)
LDH SERPL-CCNC: 257 IU/L (ref 121–224)
LEUKOCYTE ESTERASE UR QL STRIP.AUTO: ABNORMAL
LEUKOCYTE ESTERASE UR QL STRIP.AUTO: NEGATIVE
LEUKOCYTE ESTERASE UR QL STRIP.AUTO: NEGATIVE
LEVETIRACETAM SERPL-MCNC: 15.5 UG/ML (ref 10–40)
LEVETIRACETAM SERPL-MCNC: 18.2 UG/ML (ref 10–40)
LVFS 2D: 12.77 %
LVFS 2D: 17.35 %
LVFS 2D: 23.44 %
LVFS 2D: 5.16 %
LVFS 2D: 8.49 %
LYMPHOCYTES # BLD: 0.2 K/UL (ref 0.8–3.5)
LYMPHOCYTES # BLD: 0.3 K/UL (ref 0.8–3.5)
LYMPHOCYTES # BLD: 0.4 K/UL (ref 0.8–3.5)
LYMPHOCYTES # BLD: 0.4 K/UL (ref 0.8–3.5)
LYMPHOCYTES # BLD: 0.5 K/UL (ref 0.8–3.5)
LYMPHOCYTES # BLD: 0.6 K/UL (ref 0.8–3.5)
LYMPHOCYTES # BLD: 0.7 K/UL (ref 0.8–3.5)
LYMPHOCYTES # BLD: 0.8 K/UL (ref 0.8–3.5)
LYMPHOCYTES NFR BLD: 10 % (ref 12–49)
LYMPHOCYTES NFR BLD: 10 % (ref 12–49)
LYMPHOCYTES NFR BLD: 11 % (ref 12–49)
LYMPHOCYTES NFR BLD: 14 % (ref 12–49)
LYMPHOCYTES NFR BLD: 16 % (ref 12–49)
LYMPHOCYTES NFR BLD: 18 % (ref 12–49)
LYMPHOCYTES NFR BLD: 19 % (ref 12–49)
LYMPHOCYTES NFR BLD: 5 % (ref 12–49)
LYMPHOCYTES NFR BLD: 8 % (ref 12–49)
Lab: NORMAL
MAGNESIUM SERPL-MCNC: 1.6 MG/DL (ref 1.6–2.4)
MAGNESIUM SERPL-MCNC: 1.6 MG/DL (ref 1.6–2.4)
MAGNESIUM SERPL-MCNC: 1.7 MG/DL (ref 1.6–2.4)
MAGNESIUM SERPL-MCNC: 1.7 MG/DL (ref 1.6–2.4)
MAGNESIUM SERPL-MCNC: 1.8 MG/DL (ref 1.6–2.4)
MAGNESIUM SERPL-MCNC: 1.9 MG/DL (ref 1.6–2.3)
MAGNESIUM SERPL-MCNC: 1.9 MG/DL (ref 1.6–2.4)
MAGNESIUM SERPL-MCNC: 2 MG/DL (ref 1.6–2.4)
MAGNESIUM SERPL-MCNC: 2.1 MG/DL (ref 1.6–2.4)
MAGNESIUM SERPL-MCNC: 2.2 MG/DL (ref 1.6–2.4)
MAGNESIUM SERPL-MCNC: 2.2 MG/DL (ref 1.6–2.4)
MAGNESIUM SERPL-MCNC: 2.3 MG/DL (ref 1.6–2.4)
MAGNESIUM SERPL-MCNC: 2.4 MG/DL (ref 1.6–2.4)
MAGNESIUM SERPL-MCNC: 2.4 MG/DL (ref 1.6–2.4)
MAGNESIUM SERPL-MCNC: 2.5 MG/DL (ref 1.6–2.4)
MAGNESIUM SERPL-MCNC: 2.6 MG/DL (ref 1.6–2.4)
MAGNESIUM SERPL-MCNC: 2.6 MG/DL (ref 1.6–2.4)
MAGNESIUM SERPL-MCNC: 2.7 MG/DL (ref 1.6–2.4)
MCH RBC QN AUTO: 28.5 PG (ref 26–34)
MCH RBC QN AUTO: 28.6 PG (ref 26–34)
MCH RBC QN AUTO: 28.7 PG (ref 26–34)
MCH RBC QN AUTO: 28.9 PG (ref 26–34)
MCH RBC QN AUTO: 29 PG (ref 26–34)
MCH RBC QN AUTO: 29.1 PG (ref 26–34)
MCH RBC QN AUTO: 29.1 PG (ref 26–34)
MCH RBC QN AUTO: 29.2 PG (ref 26–34)
MCH RBC QN AUTO: 29.3 PG (ref 26–34)
MCH RBC QN AUTO: 29.4 PG (ref 26–34)
MCH RBC QN AUTO: 29.5 PG (ref 26–34)
MCH RBC QN AUTO: 29.6 PG (ref 26–34)
MCH RBC QN AUTO: 29.7 PG (ref 26–34)
MCH RBC QN AUTO: 29.7 PG (ref 26–34)
MCH RBC QN AUTO: 29.8 PG (ref 26.6–33)
MCH RBC QN AUTO: 29.8 PG (ref 26–34)
MCH RBC QN AUTO: 29.8 PG (ref 26–34)
MCH RBC QN AUTO: 29.9 PG (ref 26–34)
MCH RBC QN AUTO: 29.9 PG (ref 26–34)
MCH RBC QN AUTO: 30 PG (ref 26–34)
MCH RBC QN AUTO: 30.1 PG (ref 26–34)
MCH RBC QN AUTO: 30.4 PG (ref 26.6–33)
MCH RBC QN AUTO: 30.4 PG (ref 26–34)
MCH RBC QN AUTO: 30.5 PG (ref 26–34)
MCH RBC QN AUTO: 30.6 PG (ref 26–34)
MCH RBC QN AUTO: 30.7 PG (ref 26–34)
MCH RBC QN AUTO: 30.8 PG (ref 26–34)
MCH RBC QN AUTO: 30.9 PG (ref 26–34)
MCH RBC QN AUTO: 30.9 PG (ref 26–34)
MCH RBC QN AUTO: 31.1 PG (ref 26.6–33)
MCH RBC QN AUTO: 31.1 PG (ref 26–34)
MCH RBC QN AUTO: 31.6 PG (ref 26–34)
MCHC RBC AUTO-ENTMCNC: 29.6 G/DL (ref 30–36.5)
MCHC RBC AUTO-ENTMCNC: 29.7 G/DL (ref 30–36.5)
MCHC RBC AUTO-ENTMCNC: 29.7 G/DL (ref 30–36.5)
MCHC RBC AUTO-ENTMCNC: 29.8 G/DL (ref 30–36.5)
MCHC RBC AUTO-ENTMCNC: 29.8 G/DL (ref 30–36.5)
MCHC RBC AUTO-ENTMCNC: 29.9 G/DL (ref 30–36.5)
MCHC RBC AUTO-ENTMCNC: 30 G/DL (ref 30–36.5)
MCHC RBC AUTO-ENTMCNC: 30.1 G/DL (ref 30–36.5)
MCHC RBC AUTO-ENTMCNC: 30.2 G/DL (ref 30–36.5)
MCHC RBC AUTO-ENTMCNC: 30.3 G/DL (ref 30–36.5)
MCHC RBC AUTO-ENTMCNC: 30.4 G/DL (ref 30–36.5)
MCHC RBC AUTO-ENTMCNC: 30.5 G/DL (ref 30–36.5)
MCHC RBC AUTO-ENTMCNC: 30.6 G/DL (ref 30–36.5)
MCHC RBC AUTO-ENTMCNC: 30.7 G/DL (ref 30–36.5)
MCHC RBC AUTO-ENTMCNC: 30.8 G/DL (ref 30–36.5)
MCHC RBC AUTO-ENTMCNC: 30.9 G/DL (ref 30–36.5)
MCHC RBC AUTO-ENTMCNC: 31 G/DL (ref 30–36.5)
MCHC RBC AUTO-ENTMCNC: 31.1 G/DL (ref 30–36.5)
MCHC RBC AUTO-ENTMCNC: 31.2 G/DL (ref 30–36.5)
MCHC RBC AUTO-ENTMCNC: 31.3 G/DL (ref 30–36.5)
MCHC RBC AUTO-ENTMCNC: 31.3 G/DL (ref 30–36.5)
MCHC RBC AUTO-ENTMCNC: 31.4 G/DL (ref 30–36.5)
MCHC RBC AUTO-ENTMCNC: 31.5 G/DL (ref 30–36.5)
MCHC RBC AUTO-ENTMCNC: 31.5 G/DL (ref 30–36.5)
MCHC RBC AUTO-ENTMCNC: 31.6 G/DL (ref 30–36.5)
MCHC RBC AUTO-ENTMCNC: 31.9 G/DL (ref 31.5–35.7)
MCHC RBC AUTO-ENTMCNC: 32 G/DL (ref 31.5–35.7)
MCHC RBC AUTO-ENTMCNC: 32.1 G/DL (ref 30–36.5)
MCHC RBC AUTO-ENTMCNC: 33.4 G/DL (ref 31.5–35.7)
MCV RBC AUTO: 104.6 FL (ref 80–99)
MCV RBC AUTO: 93 FL (ref 79–97)
MCV RBC AUTO: 93.4 FL (ref 80–99)
MCV RBC AUTO: 93.9 FL (ref 80–99)
MCV RBC AUTO: 94 FL (ref 79–97)
MCV RBC AUTO: 94.3 FL (ref 80–99)
MCV RBC AUTO: 94.3 FL (ref 80–99)
MCV RBC AUTO: 94.5 FL (ref 80–99)
MCV RBC AUTO: 94.7 FL (ref 80–99)
MCV RBC AUTO: 94.8 FL (ref 80–99)
MCV RBC AUTO: 94.9 FL (ref 80–99)
MCV RBC AUTO: 94.9 FL (ref 80–99)
MCV RBC AUTO: 95 FL (ref 79–97)
MCV RBC AUTO: 95 FL (ref 80–99)
MCV RBC AUTO: 95 FL (ref 80–99)
MCV RBC AUTO: 95.1 FL (ref 80–99)
MCV RBC AUTO: 95.3 FL (ref 80–99)
MCV RBC AUTO: 95.6 FL (ref 80–99)
MCV RBC AUTO: 95.7 FL (ref 80–99)
MCV RBC AUTO: 95.7 FL (ref 80–99)
MCV RBC AUTO: 95.9 FL (ref 80–99)
MCV RBC AUTO: 96.1 FL (ref 80–99)
MCV RBC AUTO: 96.2 FL (ref 80–99)
MCV RBC AUTO: 96.4 FL (ref 80–99)
MCV RBC AUTO: 96.5 FL (ref 80–99)
MCV RBC AUTO: 96.8 FL (ref 80–99)
MCV RBC AUTO: 96.9 FL (ref 80–99)
MCV RBC AUTO: 96.9 FL (ref 80–99)
MCV RBC AUTO: 97 FL (ref 80–99)
MCV RBC AUTO: 97.1 FL (ref 80–99)
MCV RBC AUTO: 97.4 FL (ref 80–99)
MCV RBC AUTO: 97.4 FL (ref 80–99)
MCV RBC AUTO: 97.6 FL (ref 80–99)
MCV RBC AUTO: 97.8 FL (ref 80–99)
MCV RBC AUTO: 97.9 FL (ref 80–99)
MCV RBC AUTO: 98 FL (ref 80–99)
MCV RBC AUTO: 98.3 FL (ref 80–99)
MCV RBC AUTO: 98.3 FL (ref 80–99)
MCV RBC AUTO: 98.4 FL (ref 80–99)
MCV RBC AUTO: 98.5 FL (ref 80–99)
MCV RBC AUTO: 98.5 FL (ref 80–99)
MCV RBC AUTO: 98.6 FL (ref 80–99)
MCV RBC AUTO: 98.6 FL (ref 80–99)
MCV RBC AUTO: 98.8 FL (ref 80–99)
MCV RBC AUTO: 99.1 FL (ref 80–99)
MCV RBC AUTO: 99.2 FL (ref 80–99)
MCV RBC AUTO: 99.2 FL (ref 80–99)
MCV RBC AUTO: 99.3 FL (ref 80–99)
MCV RBC AUTO: 99.6 FL (ref 80–99)
MCV RBC AUTO: 99.6 FL (ref 80–99)
METAMYELOCYTES NFR BLD MANUAL: 0 %
MONOCYTES # BLD: 0.1 K/UL (ref 0–1)
MONOCYTES # BLD: 0.2 K/UL (ref 0–1)
MONOCYTES # BLD: 0.2 K/UL (ref 0–1)
MONOCYTES # BLD: 0.3 K/UL (ref 0–1)
MONOCYTES # BLD: 0.4 K/UL (ref 0–1)
MONOCYTES # BLD: 0.5 K/UL (ref 0–1)
MONOCYTES NFR BLD: 10 % (ref 5–13)
MONOCYTES NFR BLD: 10 % (ref 5–13)
MONOCYTES NFR BLD: 11 % (ref 5–13)
MONOCYTES NFR BLD: 12 % (ref 5–13)
MONOCYTES NFR BLD: 3 % (ref 5–13)
MONOCYTES NFR BLD: 5 % (ref 5–13)
MONOCYTES NFR BLD: 5 % (ref 5–13)
MONOCYTES NFR BLD: 6 % (ref 5–13)
MONOCYTES NFR BLD: 6 % (ref 5–13)
MONOCYTES NFR BLD: 7 % (ref 5–13)
MONOCYTES NFR BLD: 8 % (ref 5–13)
MONOCYTES NFR BLD: 9 % (ref 5–13)
MONOCYTES NFR BLD: 9 % (ref 5–13)
MUCOUS THREADS URNS QL MICRO: ABNORMAL /LPF
MUCOUS THREADS URNS QL MICRO: ABNORMAL /LPF
MYELOCYTES NFR BLD MANUAL: 0 %
NEUTS BAND NFR BLD MANUAL: 0 % (ref 0–6)
NEUTS BAND NFR BLD MANUAL: 0 % (ref 0–6)
NEUTS BAND NFR BLD MANUAL: 2 % (ref 0–6)
NEUTS BAND NFR BLD MANUAL: 2 % (ref 0–6)
NEUTS BAND NFR BLD MANUAL: 3 % (ref 0–6)
NEUTS SEG # BLD: 1.7 K/UL (ref 1.8–8)
NEUTS SEG # BLD: 1.9 K/UL (ref 1.8–8)
NEUTS SEG # BLD: 2.5 K/UL (ref 1.8–8)
NEUTS SEG # BLD: 2.6 K/UL (ref 1.8–8)
NEUTS SEG # BLD: 2.6 K/UL (ref 1.8–8)
NEUTS SEG # BLD: 2.7 K/UL (ref 1.8–8)
NEUTS SEG # BLD: 3 K/UL (ref 1.8–8)
NEUTS SEG # BLD: 3.3 K/UL (ref 1.8–8)
NEUTS SEG # BLD: 3.9 K/UL (ref 1.8–8)
NEUTS SEG # BLD: 4.3 K/UL (ref 1.8–8)
NEUTS SEG # BLD: 5 K/UL (ref 1.8–8)
NEUTS SEG # BLD: 5.2 K/UL (ref 1.8–8)
NEUTS SEG # BLD: 5.9 K/UL (ref 1.8–8)
NEUTS SEG NFR BLD: 62 % (ref 32–75)
NEUTS SEG NFR BLD: 68 % (ref 32–75)
NEUTS SEG NFR BLD: 71 % (ref 32–75)
NEUTS SEG NFR BLD: 72 % (ref 32–75)
NEUTS SEG NFR BLD: 76 % (ref 32–75)
NEUTS SEG NFR BLD: 78 % (ref 32–75)
NEUTS SEG NFR BLD: 79 % (ref 32–75)
NEUTS SEG NFR BLD: 79 % (ref 32–75)
NEUTS SEG NFR BLD: 80 % (ref 32–75)
NEUTS SEG NFR BLD: 82 % (ref 32–75)
NEUTS SEG NFR BLD: 82 % (ref 32–75)
NEUTS SEG NFR BLD: 89 % (ref 32–75)
NEUTS SEG NFR BLD: 89 % (ref 32–75)
NITRITE UR QL STRIP.AUTO: NEGATIVE
NRBC # BLD: 0 K/UL (ref 0–0.01)
NRBC BLD-RTO: 0 PER 100 WBC
NT-PROBNP SERPL-MCNC: 2224 PG/ML (ref 0–376)
NT-PROBNP SERPL-MCNC: 2403 PG/ML (ref 0–376)
NT-PROBNP SERPL-MCNC: 4977 PG/ML (ref 0–376)
OTHER CELLS NFR BLD MANUAL: 0 %
OTHER,OTHU: ABNORMAL
OTHER,OTHU: ABNORMAL
PCO2 BLD: 20.7 MMHG (ref 35–45)
PERIPHERAL SMEAR,PSM: NORMAL
PH BLD: 7.56 [PH] (ref 7.35–7.45)
PH UR STRIP: 5 [PH] (ref 5–8)
PH UR STRIP: 5.5 [PH] (ref 5–8)
PH UR STRIP: 5.5 [PH] (ref 5–8)
PH UR STRIP: 6 [PH] (ref 5–8)
PH UR STRIP: 7 [PH] (ref 5–8)
PHOSPHATE SERPL-MCNC: 2.8 MG/DL (ref 2.6–4.7)
PHOSPHATE SERPL-MCNC: 2.9 MG/DL (ref 2.6–4.7)
PHOSPHATE SERPL-MCNC: 3 MG/DL (ref 2.6–4.7)
PHOSPHATE SERPL-MCNC: 3.1 MG/DL (ref 2.6–4.7)
PHOSPHATE SERPL-MCNC: 3.2 MG/DL (ref 2.6–4.7)
PHOSPHATE SERPL-MCNC: 3.5 MG/DL (ref 2.6–4.7)
PHOSPHATE SERPL-MCNC: 4.1 MG/DL (ref 2.6–4.7)
PLATELET # BLD AUTO: 104 K/UL (ref 150–400)
PLATELET # BLD AUTO: 109 K/UL (ref 150–400)
PLATELET # BLD AUTO: 114 K/UL (ref 150–400)
PLATELET # BLD AUTO: 114 K/UL (ref 150–400)
PLATELET # BLD AUTO: 123 K/UL (ref 150–400)
PLATELET # BLD AUTO: 124 K/UL (ref 150–400)
PLATELET # BLD AUTO: 124 K/UL (ref 150–400)
PLATELET # BLD AUTO: 127 K/UL (ref 150–400)
PLATELET # BLD AUTO: 127 K/UL (ref 150–400)
PLATELET # BLD AUTO: 129 K/UL (ref 150–400)
PLATELET # BLD AUTO: 131 K/UL (ref 150–400)
PLATELET # BLD AUTO: 133 K/UL (ref 150–400)
PLATELET # BLD AUTO: 133 K/UL (ref 150–400)
PLATELET # BLD AUTO: 136 K/UL (ref 150–400)
PLATELET # BLD AUTO: 136 K/UL (ref 150–400)
PLATELET # BLD AUTO: 138 K/UL (ref 150–400)
PLATELET # BLD AUTO: 138 K/UL (ref 150–400)
PLATELET # BLD AUTO: 139 K/UL (ref 150–400)
PLATELET # BLD AUTO: 139 K/UL (ref 150–400)
PLATELET # BLD AUTO: 142 K/UL (ref 150–400)
PLATELET # BLD AUTO: 144 K/UL (ref 150–400)
PLATELET # BLD AUTO: 145 X10E3/UL (ref 150–450)
PLATELET # BLD AUTO: 151 K/UL (ref 150–400)
PLATELET # BLD AUTO: 152 K/UL (ref 150–400)
PLATELET # BLD AUTO: 152 X10E3/UL (ref 150–450)
PLATELET # BLD AUTO: 153 K/UL (ref 150–400)
PLATELET # BLD AUTO: 154 K/UL (ref 150–400)
PLATELET # BLD AUTO: 154 K/UL (ref 150–400)
PLATELET # BLD AUTO: 157 K/UL (ref 150–400)
PLATELET # BLD AUTO: 157 K/UL (ref 150–400)
PLATELET # BLD AUTO: 158 K/UL (ref 150–400)
PLATELET # BLD AUTO: 158 K/UL (ref 150–400)
PLATELET # BLD AUTO: 159 K/UL (ref 150–400)
PLATELET # BLD AUTO: 161 K/UL (ref 150–400)
PLATELET # BLD AUTO: 164 K/UL (ref 150–400)
PLATELET # BLD AUTO: 169 K/UL (ref 150–400)
PLATELET # BLD AUTO: 169 K/UL (ref 150–400)
PLATELET # BLD AUTO: 170 K/UL (ref 150–400)
PLATELET # BLD AUTO: 174 K/UL (ref 150–400)
PLATELET # BLD AUTO: 176 K/UL (ref 150–400)
PLATELET # BLD AUTO: 178 K/UL (ref 150–400)
PLATELET # BLD AUTO: 179 K/UL (ref 150–400)
PLATELET # BLD AUTO: 180 K/UL (ref 150–400)
PLATELET # BLD AUTO: 181 K/UL (ref 150–400)
PLATELET # BLD AUTO: 186 K/UL (ref 150–400)
PLATELET # BLD AUTO: 191 K/UL (ref 150–400)
PLATELET # BLD AUTO: 201 K/UL (ref 150–400)
PLATELET # BLD AUTO: 202 K/UL (ref 150–400)
PLATELET # BLD AUTO: 203 X10E3/UL (ref 150–450)
PLATELET # BLD AUTO: 208 K/UL (ref 150–400)
PLATELET # BLD AUTO: 68 K/UL (ref 150–400)
PLATELET # BLD AUTO: 69 K/UL (ref 150–400)
PLATELET # BLD AUTO: 78 K/UL (ref 150–400)
PLATELET # BLD AUTO: 83 K/UL (ref 150–400)
PLATELET # BLD AUTO: 89 K/UL (ref 150–400)
PLATELET # BLD AUTO: 90 K/UL (ref 150–400)
PLATELET # BLD AUTO: 93 K/UL (ref 150–400)
PLATELET # BLD AUTO: 94 K/UL (ref 150–400)
PLATELET COMMENTS,PCOM: ABNORMAL
PMV BLD AUTO: 10 FL (ref 8.9–12.9)
PMV BLD AUTO: 10.1 FL (ref 8.9–12.9)
PMV BLD AUTO: 10.2 FL (ref 8.9–12.9)
PMV BLD AUTO: 10.3 FL (ref 8.9–12.9)
PMV BLD AUTO: 10.4 FL (ref 8.9–12.9)
PMV BLD AUTO: 10.4 FL (ref 8.9–12.9)
PMV BLD AUTO: 10.5 FL (ref 8.9–12.9)
PMV BLD AUTO: 10.6 FL (ref 8.9–12.9)
PMV BLD AUTO: 10.6 FL (ref 8.9–12.9)
PMV BLD AUTO: 10.7 FL (ref 8.9–12.9)
PMV BLD AUTO: 10.7 FL (ref 8.9–12.9)
PMV BLD AUTO: 10.8 FL (ref 8.9–12.9)
PMV BLD AUTO: 10.9 FL (ref 8.9–12.9)
PMV BLD AUTO: 11 FL (ref 8.9–12.9)
PMV BLD AUTO: 11.2 FL (ref 8.9–12.9)
PMV BLD AUTO: 12.1 FL (ref 8.9–12.9)
PMV BLD AUTO: 8.6 FL (ref 8.9–12.9)
PMV BLD AUTO: 8.7 FL (ref 8.9–12.9)
PMV BLD AUTO: 8.7 FL (ref 8.9–12.9)
PMV BLD AUTO: 8.8 FL (ref 8.9–12.9)
PMV BLD AUTO: 8.9 FL (ref 8.9–12.9)
PMV BLD AUTO: 8.9 FL (ref 8.9–12.9)
PMV BLD AUTO: 9 FL (ref 8.9–12.9)
PMV BLD AUTO: 9.1 FL (ref 8.9–12.9)
PMV BLD AUTO: 9.1 FL (ref 8.9–12.9)
PMV BLD AUTO: 9.2 FL (ref 8.9–12.9)
PMV BLD AUTO: 9.3 FL (ref 8.9–12.9)
PMV BLD AUTO: 9.4 FL (ref 8.9–12.9)
PMV BLD AUTO: 9.5 FL (ref 8.9–12.9)
PMV BLD AUTO: 9.5 FL (ref 8.9–12.9)
PMV BLD AUTO: 9.6 FL (ref 8.9–12.9)
PMV BLD AUTO: 9.6 FL (ref 8.9–12.9)
PMV BLD AUTO: 9.7 FL (ref 8.9–12.9)
PMV BLD AUTO: 9.7 FL (ref 8.9–12.9)
PMV BLD AUTO: 9.8 FL (ref 8.9–12.9)
PMV BLD AUTO: 9.9 FL (ref 8.9–12.9)
PO2 BLD: 109 MMHG (ref 80–100)
POTASSIUM SERPL-SCNC: 3.2 MMOL/L (ref 3.5–5.1)
POTASSIUM SERPL-SCNC: 3.5 MMOL/L (ref 3.5–5.1)
POTASSIUM SERPL-SCNC: 3.8 MMOL/L (ref 3.5–5.1)
POTASSIUM SERPL-SCNC: 3.9 MMOL/L (ref 3.5–5.1)
POTASSIUM SERPL-SCNC: 4 MMOL/L (ref 3.5–5.1)
POTASSIUM SERPL-SCNC: 4.1 MMOL/L (ref 3.5–5.1)
POTASSIUM SERPL-SCNC: 4.2 MMOL/L (ref 3.5–5.1)
POTASSIUM SERPL-SCNC: 4.2 MMOL/L (ref 3.5–5.2)
POTASSIUM SERPL-SCNC: 4.3 MMOL/L (ref 3.5–5.1)
POTASSIUM SERPL-SCNC: 4.4 MMOL/L (ref 3.5–5.1)
POTASSIUM SERPL-SCNC: 4.4 MMOL/L (ref 3.5–5.2)
POTASSIUM SERPL-SCNC: 4.5 MMOL/L (ref 3.5–5.1)
POTASSIUM SERPL-SCNC: 4.6 MMOL/L (ref 3.5–5.1)
POTASSIUM SERPL-SCNC: 4.7 MMOL/L (ref 3.5–5.1)
POTASSIUM SERPL-SCNC: 4.8 MMOL/L (ref 3.5–5.1)
POTASSIUM SERPL-SCNC: 4.9 MMOL/L (ref 3.5–5.1)
POTASSIUM SERPL-SCNC: 4.9 MMOL/L (ref 3.5–5.1)
POTASSIUM SERPL-SCNC: 4.9 MMOL/L (ref 3.5–5.2)
PREALB SERPL-MCNC: 13.3 MG/DL (ref 20–40)
PREALB SERPL-MCNC: 16 MG/DL (ref 10–36)
PREALB SERPL-MCNC: 16.4 MG/DL (ref 20–40)
PREALB SERPL-MCNC: 16.9 MG/DL (ref 20–40)
PREALB SERPL-MCNC: 17.2 MG/DL (ref 20–40)
PREALB SERPL-MCNC: 17.7 MG/DL (ref 20–40)
PREALB SERPL-MCNC: 17.7 MG/DL (ref 20–40)
PROCALCITONIN SERPL-MCNC: 0.05 NG/ML
PROCALCITONIN SERPL-MCNC: 0.06 NG/ML
PROCALCITONIN SERPL-MCNC: 0.08 NG/ML
PROCALCITONIN SERPL-MCNC: 0.1 NG/ML
PROCALCITONIN SERPL-MCNC: 0.11 NG/ML
PROCALCITONIN SERPL-MCNC: 0.12 NG/ML
PROCALCITONIN SERPL-MCNC: 0.12 NG/ML
PROCALCITONIN SERPL-MCNC: 0.13 NG/ML
PROCALCITONIN SERPL-MCNC: 0.14 NG/ML
PROCALCITONIN SERPL-MCNC: 0.15 NG/ML
PROCALCITONIN SERPL-MCNC: 0.15 NG/ML
PROCALCITONIN SERPL-MCNC: 0.16 NG/ML
PROCALCITONIN SERPL-MCNC: 0.18 NG/ML
PROCALCITONIN SERPL-MCNC: 0.24 NG/ML
PROCALCITONIN SERPL-MCNC: 0.26 NG/ML
PROCALCITONIN SERPL-MCNC: 0.27 NG/ML
PROCALCITONIN SERPL-MCNC: 0.29 NG/ML
PROCALCITONIN SERPL-MCNC: 0.29 NG/ML
PROCALCITONIN SERPL-MCNC: 0.3 NG/ML
PROCALCITONIN SERPL-MCNC: 0.3 NG/ML
PROCALCITONIN SERPL-MCNC: 0.31 NG/ML
PROCALCITONIN SERPL-MCNC: 0.32 NG/ML
PROCALCITONIN SERPL-MCNC: 0.35 NG/ML
PROCALCITONIN SERPL-MCNC: 0.37 NG/ML
PROCALCITONIN SERPL-MCNC: 0.38 NG/ML
PROCALCITONIN SERPL-MCNC: 0.42 NG/ML
PROCALCITONIN SERPL-MCNC: 0.42 NG/ML
PROCALCITONIN SERPL-MCNC: 0.44 NG/ML
PROCALCITONIN SERPL-MCNC: 0.45 NG/ML
PROCALCITONIN SERPL-MCNC: 0.45 NG/ML
PROCALCITONIN SERPL-MCNC: 0.53 NG/ML
PROCALCITONIN SERPL-MCNC: 0.55 NG/ML
PROCALCITONIN SERPL-MCNC: 0.61 NG/ML
PROCALCITONIN SERPL-MCNC: 0.66 NG/ML
PROCALCITONIN SERPL-MCNC: 0.67 NG/ML
PROCALCITONIN SERPL-MCNC: 0.67 NG/ML
PROCALCITONIN SERPL-MCNC: 0.68 NG/ML
PROCALCITONIN SERPL-MCNC: 0.69 NG/ML
PROCALCITONIN SERPL-MCNC: 0.84 NG/ML
PROCALCITONIN SERPL-MCNC: 0.9 NG/ML
PROCALCITONIN SERPL-MCNC: 1.85 NG/ML
PROCALCITONIN SERPL-MCNC: 3.97 NG/ML
PROCALCITONIN SERPL-MCNC: 5.1 NG/ML
PROCALCITONIN SERPL-MCNC: <0.05 NG/ML
PROMYELOCYTES NFR BLD MANUAL: 0 %
PROT SERPL-MCNC: 5.6 G/DL (ref 6.4–8.2)
PROT SERPL-MCNC: 5.7 G/DL (ref 6.4–8.2)
PROT SERPL-MCNC: 5.8 G/DL (ref 6.4–8.2)
PROT SERPL-MCNC: 5.8 G/DL (ref 6.4–8.2)
PROT SERPL-MCNC: 5.9 G/DL (ref 6.4–8.2)
PROT SERPL-MCNC: 6 G/DL (ref 6.4–8.2)
PROT SERPL-MCNC: 6.1 G/DL (ref 6.4–8.2)
PROT SERPL-MCNC: 6.1 G/DL (ref 6.4–8.2)
PROT SERPL-MCNC: 6.2 G/DL (ref 6.4–8.2)
PROT SERPL-MCNC: 6.2 G/DL (ref 6–8.5)
PROT SERPL-MCNC: 6.3 G/DL (ref 6.4–8.2)
PROT SERPL-MCNC: 6.4 G/DL (ref 6.4–8.2)
PROT SERPL-MCNC: 6.5 G/DL (ref 6.4–8.2)
PROT SERPL-MCNC: 6.5 G/DL (ref 6–8.5)
PROT SERPL-MCNC: 6.6 G/DL (ref 6.4–8.2)
PROT SERPL-MCNC: 6.6 G/DL (ref 6–8.5)
PROT SERPL-MCNC: 6.7 G/DL (ref 6.4–8.2)
PROT SERPL-MCNC: 6.8 G/DL (ref 6.4–8.2)
PROT SERPL-MCNC: 6.8 G/DL (ref 6.4–8.2)
PROT SERPL-MCNC: 6.9 G/DL (ref 6.4–8.2)
PROT SERPL-MCNC: 7 G/DL (ref 6.4–8.2)
PROT SERPL-MCNC: 7.1 G/DL (ref 6.4–8.2)
PROT SERPL-MCNC: 7.2 G/DL (ref 6.4–8.2)
PROT SERPL-MCNC: 7.3 G/DL (ref 6.4–8.2)
PROT SERPL-MCNC: 7.4 G/DL (ref 6.4–8.2)
PROT SERPL-MCNC: 7.4 G/DL (ref 6.4–8.2)
PROT SERPL-MCNC: 7.5 G/DL (ref 6.4–8.2)
PROT SERPL-MCNC: 7.5 G/DL (ref 6.4–8.2)
PROT SERPL-MCNC: 7.6 G/DL (ref 6.4–8.2)
PROT SERPL-MCNC: 7.6 G/DL (ref 6.4–8.2)
PROT SERPL-MCNC: 7.8 G/DL (ref 6.4–8.2)
PROT UR STRIP-MCNC: 100 MG/DL
PROT UR STRIP-MCNC: 100 MG/DL
PROT UR STRIP-MCNC: 30 MG/DL
PROT UR STRIP-MCNC: 300 MG/DL
PROT UR STRIP-MCNC: 300 MG/DL
PROTHROMBIN TIME: 11.5 SEC (ref 9–11.1)
PROTHROMBIN TIME: 11.7 SEC (ref 9–11.1)
PROTHROMBIN TIME: 11.9 SEC (ref 9–11.1)
PROTHROMBIN TIME: 12 SEC (ref 9–11.1)
PROTHROMBIN TIME: 12.3 SEC (ref 9.1–12)
PROTHROMBIN TIME: 12.4 SEC (ref 9–11.1)
PROTHROMBIN TIME: 12.4 SEC (ref 9–11.1)
PROTHROMBIN TIME: 12.7 SEC (ref 9–11.1)
PROTHROMBIN TIME: 12.9 SEC (ref 9–11.1)
PROTHROMBIN TIME: 13.1 SEC (ref 9–11.1)
PROTHROMBIN TIME: 13.2 SEC (ref 9–11.1)
PROTHROMBIN TIME: 13.2 SEC (ref 9–11.1)
PROTHROMBIN TIME: 13.4 SEC (ref 9–11.1)
PROTHROMBIN TIME: 13.5 SEC (ref 9–11.1)
PROTHROMBIN TIME: 13.8 SEC (ref 9–11.1)
PROTHROMBIN TIME: 14 SEC (ref 9–11.1)
PROTHROMBIN TIME: 14.2 SEC (ref 9–11.1)
PROTHROMBIN TIME: 14.9 SEC (ref 9–11.1)
PROTHROMBIN TIME: 14.9 SEC (ref 9–11.1)
PROTHROMBIN TIME: 15 SEC (ref 9–11.1)
PROTHROMBIN TIME: 15.1 SEC (ref 9–11.1)
PROTHROMBIN TIME: 15.4 SEC (ref 9–11.1)
PROTHROMBIN TIME: 15.6 SEC (ref 9–11.1)
PROTHROMBIN TIME: 16 SEC (ref 9–11.1)
PROTHROMBIN TIME: 16.9 SEC (ref 9–11.1)
PROTHROMBIN TIME: 17.2 SEC (ref 9–11.1)
PROTHROMBIN TIME: 17.5 SEC (ref 9–11.1)
PROTHROMBIN TIME: 17.7 SEC (ref 9–11.1)
PROTHROMBIN TIME: 17.7 SEC (ref 9–11.1)
PROTHROMBIN TIME: 17.8 SEC (ref 9–11.1)
PROTHROMBIN TIME: 18 SEC (ref 9–11.1)
PROTHROMBIN TIME: 18.1 SEC (ref 9–11.1)
PROTHROMBIN TIME: 18.7 SEC (ref 9–11.1)
PROTHROMBIN TIME: 19.3 SEC (ref 9–11.1)
PROTHROMBIN TIME: 19.8 SEC (ref 9–11.1)
PROTHROMBIN TIME: 19.8 SEC (ref 9–11.1)
PROTHROMBIN TIME: 20.3 SEC (ref 9–11.1)
PROTHROMBIN TIME: 20.4 SEC (ref 9–11.1)
PROTHROMBIN TIME: 20.7 SEC (ref 9–11.1)
PROTHROMBIN TIME: 21.9 SEC (ref 9–11.1)
PROTHROMBIN TIME: 24.7 SEC (ref 9–11.1)
PSA SERPL-MCNC: 0.4 NG/ML (ref 0.01–4)
Q-T INTERVAL, ECG07: 326 MS
Q-T INTERVAL, ECG07: 442 MS
Q-T INTERVAL, ECG07: 446 MS
QRS DURATION, ECG06: 120 MS
QRS DURATION, ECG06: 162 MS
QRS DURATION, ECG06: 20 MS
QTC CALCULATION (BEZET), ECG08: 462 MS
QTC CALCULATION (BEZET), ECG08: 501 MS
QTC CALCULATION (BEZET), ECG08: 522 MS
RBC # BLD AUTO: 1.96 M/UL (ref 4.1–5.7)
RBC # BLD AUTO: 1.96 M/UL (ref 4.1–5.7)
RBC # BLD AUTO: 2.23 M/UL (ref 4.1–5.7)
RBC # BLD AUTO: 2.41 M/UL (ref 4.1–5.7)
RBC # BLD AUTO: 2.46 M/UL (ref 4.1–5.7)
RBC # BLD AUTO: 2.46 M/UL (ref 4.1–5.7)
RBC # BLD AUTO: 2.47 M/UL (ref 4.1–5.7)
RBC # BLD AUTO: 2.47 M/UL (ref 4.1–5.7)
RBC # BLD AUTO: 2.49 M/UL (ref 4.1–5.7)
RBC # BLD AUTO: 2.5 M/UL (ref 4.1–5.7)
RBC # BLD AUTO: 2.54 M/UL (ref 4.1–5.7)
RBC # BLD AUTO: 2.56 M/UL (ref 4.1–5.7)
RBC # BLD AUTO: 2.57 M/UL (ref 4.1–5.7)
RBC # BLD AUTO: 2.58 M/UL (ref 4.1–5.7)
RBC # BLD AUTO: 2.59 M/UL (ref 4.1–5.7)
RBC # BLD AUTO: 2.6 M/UL (ref 4.1–5.7)
RBC # BLD AUTO: 2.6 M/UL (ref 4.1–5.7)
RBC # BLD AUTO: 2.6 X10E6/UL (ref 4.14–5.8)
RBC # BLD AUTO: 2.63 M/UL (ref 4.1–5.7)
RBC # BLD AUTO: 2.67 M/UL (ref 4.1–5.7)
RBC # BLD AUTO: 2.69 M/UL (ref 4.1–5.7)
RBC # BLD AUTO: 2.73 M/UL (ref 4.1–5.7)
RBC # BLD AUTO: 2.77 M/UL (ref 4.1–5.7)
RBC # BLD AUTO: 2.79 M/UL (ref 4.1–5.7)
RBC # BLD AUTO: 2.8 M/UL (ref 4.1–5.7)
RBC # BLD AUTO: 2.85 M/UL (ref 4.1–5.7)
RBC # BLD AUTO: 2.91 M/UL (ref 4.1–5.7)
RBC # BLD AUTO: 2.94 M/UL (ref 4.1–5.7)
RBC # BLD AUTO: 2.95 M/UL (ref 4.1–5.7)
RBC # BLD AUTO: 2.97 M/UL (ref 4.1–5.7)
RBC # BLD AUTO: 2.98 M/UL (ref 4.1–5.7)
RBC # BLD AUTO: 2.99 M/UL (ref 4.1–5.7)
RBC # BLD AUTO: 3.04 M/UL (ref 4.1–5.7)
RBC # BLD AUTO: 3.05 M/UL (ref 4.1–5.7)
RBC # BLD AUTO: 3.05 M/UL (ref 4.1–5.7)
RBC # BLD AUTO: 3.08 M/UL (ref 4.1–5.7)
RBC # BLD AUTO: 3.08 M/UL (ref 4.1–5.7)
RBC # BLD AUTO: 3.09 M/UL (ref 4.1–5.7)
RBC # BLD AUTO: 3.1 M/UL (ref 4.1–5.7)
RBC # BLD AUTO: 3.11 M/UL (ref 4.1–5.7)
RBC # BLD AUTO: 3.15 M/UL (ref 4.1–5.7)
RBC # BLD AUTO: 3.15 X10E6/UL (ref 4.14–5.8)
RBC # BLD AUTO: 3.18 M/UL (ref 4.1–5.7)
RBC # BLD AUTO: 3.19 M/UL (ref 4.1–5.7)
RBC # BLD AUTO: 3.19 M/UL (ref 4.1–5.7)
RBC # BLD AUTO: 3.22 M/UL (ref 4.1–5.7)
RBC # BLD AUTO: 3.24 M/UL (ref 4.1–5.7)
RBC # BLD AUTO: 3.25 M/UL (ref 4.1–5.7)
RBC # BLD AUTO: 3.26 M/UL (ref 4.1–5.7)
RBC # BLD AUTO: 3.3 M/UL (ref 4.1–5.7)
RBC # BLD AUTO: 3.32 M/UL (ref 4.1–5.7)
RBC # BLD AUTO: 3.34 M/UL (ref 4.1–5.7)
RBC # BLD AUTO: 3.35 M/UL (ref 4.1–5.7)
RBC # BLD AUTO: 3.37 M/UL (ref 4.1–5.7)
RBC # BLD AUTO: 3.42 M/UL (ref 4.1–5.7)
RBC # BLD AUTO: 3.44 M/UL (ref 4.1–5.7)
RBC # BLD AUTO: 3.46 M/UL (ref 4.1–5.7)
RBC # BLD AUTO: 3.5 M/UL (ref 4.1–5.7)
RBC # BLD AUTO: 3.52 X10E6/UL (ref 4.14–5.8)
RBC # BLD AUTO: 3.64 M/UL (ref 4.1–5.7)
RBC #/AREA URNS HPF: >100 /HPF (ref 0–5)
RBC #/AREA URNS HPF: ABNORMAL /HPF (ref 0–5)
RBC #/AREA URNS HPF: ABNORMAL /HPF (ref 0–5)
RBC MORPH BLD: ABNORMAL
REFERENCE LAB,REFLB: NORMAL
SAMPLES BEING HELD,HOLD: NORMAL
SAO2 % BLD: 99 % (ref 92–97)
SERVICE CMNT-IMP: ABNORMAL
SERVICE CMNT-IMP: NORMAL
SODIUM SERPL-SCNC: 129 MMOL/L (ref 136–145)
SODIUM SERPL-SCNC: 130 MMOL/L (ref 136–145)
SODIUM SERPL-SCNC: 131 MMOL/L (ref 136–145)
SODIUM SERPL-SCNC: 132 MMOL/L (ref 136–145)
SODIUM SERPL-SCNC: 133 MMOL/L (ref 136–145)
SODIUM SERPL-SCNC: 134 MMOL/L (ref 136–145)
SODIUM SERPL-SCNC: 135 MMOL/L (ref 134–144)
SODIUM SERPL-SCNC: 135 MMOL/L (ref 136–145)
SODIUM SERPL-SCNC: 136 MMOL/L (ref 136–145)
SODIUM SERPL-SCNC: 137 MMOL/L (ref 136–145)
SODIUM SERPL-SCNC: 138 MMOL/L (ref 136–145)
SODIUM SERPL-SCNC: 139 MMOL/L (ref 134–144)
SODIUM SERPL-SCNC: 139 MMOL/L (ref 136–145)
SODIUM SERPL-SCNC: 139 MMOL/L (ref 136–145)
SODIUM SERPL-SCNC: 140 MMOL/L (ref 134–144)
SODIUM SERPL-SCNC: 140 MMOL/L (ref 136–145)
SP GR UR REFRACTOMETRY: 1.01 (ref 1–1.03)
SP GR UR REFRACTOMETRY: 1.02 (ref 1–1.03)
SPECIMEN EXP DATE BLD: NORMAL
SPECIMEN TYPE: ABNORMAL
STATUS OF UNIT,%ST: NORMAL
STRESS TARGET HR: 151 BPM
T4 FREE SERPL-MCNC: 1.4 NG/DL (ref 0.8–1.5)
T4 FREE SERPL-MCNC: 1.5 NG/DL (ref 0.8–1.5)
TEST DESCRIPTION:,ATST: NORMAL
THERAPEUTIC RANGE,PTTT: ABNORMAL SECS (ref 58–77)
THERAPEUTIC RANGE,PTTT: NORMAL SECS (ref 58–77)
TIBC SERPL-MCNC: 112 UG/DL (ref 250–450)
TIBC SERPL-MCNC: 144 UG/DL (ref 250–450)
TIBC SERPL-MCNC: 146 UG/DL (ref 250–450)
TIBC SERPL-MCNC: 183 UG/DL (ref 250–450)
TIBC SERPL-MCNC: 222 UG/DL (ref 250–450)
TROPONIN I SERPL-MCNC: <0.05 NG/ML
TSH SERPL DL<=0.05 MIU/L-ACNC: 1.13 UIU/ML (ref 0.36–3.74)
TSH SERPL DL<=0.05 MIU/L-ACNC: 1.7 UIU/ML (ref 0.36–3.74)
UA: UC IF INDICATED,UAUC: ABNORMAL
UA: UC IF INDICATED,UAUC: ABNORMAL
UNIT DIVISION, %UDIV: 0
UR CULT HOLD, URHOLD: NORMAL
UROBILINOGEN UR QL STRIP.AUTO: 0.2 EU/DL (ref 0.2–1)
UROBILINOGEN UR QL STRIP.AUTO: 1 EU/DL (ref 0.2–1)
VENTRICULAR RATE, ECG03: 121 BPM
VENTRICULAR RATE, ECG03: 76 BPM
VENTRICULAR RATE, ECG03: 84 BPM
VIT B12 SERPL-MCNC: 678 PG/ML (ref 193–986)
VWF MULTIMERS PPP IB: NORMAL
WBC # BLD AUTO: 2.4 K/UL (ref 4.1–11.1)
WBC # BLD AUTO: 2.4 K/UL (ref 4.1–11.1)
WBC # BLD AUTO: 2.5 K/UL (ref 4.1–11.1)
WBC # BLD AUTO: 2.5 K/UL (ref 4.1–11.1)
WBC # BLD AUTO: 2.6 K/UL (ref 4.1–11.1)
WBC # BLD AUTO: 2.8 K/UL (ref 4.1–11.1)
WBC # BLD AUTO: 3.3 K/UL (ref 4.1–11.1)
WBC # BLD AUTO: 3.4 K/UL (ref 4.1–11.1)
WBC # BLD AUTO: 3.6 K/UL (ref 4.1–11.1)
WBC # BLD AUTO: 3.7 K/UL (ref 4.1–11.1)
WBC # BLD AUTO: 3.8 K/UL (ref 4.1–11.1)
WBC # BLD AUTO: 3.8 K/UL (ref 4.1–11.1)
WBC # BLD AUTO: 3.8 X10E3/UL (ref 3.4–10.8)
WBC # BLD AUTO: 3.9 K/UL (ref 4.1–11.1)
WBC # BLD AUTO: 4 K/UL (ref 4.1–11.1)
WBC # BLD AUTO: 4 X10E3/UL (ref 3.4–10.8)
WBC # BLD AUTO: 4.1 K/UL (ref 4.1–11.1)
WBC # BLD AUTO: 4.2 K/UL (ref 4.1–11.1)
WBC # BLD AUTO: 4.2 K/UL (ref 4.1–11.1)
WBC # BLD AUTO: 4.3 K/UL (ref 4.1–11.1)
WBC # BLD AUTO: 4.5 K/UL (ref 4.1–11.1)
WBC # BLD AUTO: 4.5 K/UL (ref 4.1–11.1)
WBC # BLD AUTO: 4.6 K/UL (ref 4.1–11.1)
WBC # BLD AUTO: 4.9 K/UL (ref 4.1–11.1)
WBC # BLD AUTO: 5 K/UL (ref 4.1–11.1)
WBC # BLD AUTO: 5.1 K/UL (ref 4.1–11.1)
WBC # BLD AUTO: 5.2 K/UL (ref 4.1–11.1)
WBC # BLD AUTO: 5.2 K/UL (ref 4.1–11.1)
WBC # BLD AUTO: 5.3 K/UL (ref 4.1–11.1)
WBC # BLD AUTO: 5.4 K/UL (ref 4.1–11.1)
WBC # BLD AUTO: 5.6 K/UL (ref 4.1–11.1)
WBC # BLD AUTO: 5.6 K/UL (ref 4.1–11.1)
WBC # BLD AUTO: 5.7 K/UL (ref 4.1–11.1)
WBC # BLD AUTO: 5.8 K/UL (ref 4.1–11.1)
WBC # BLD AUTO: 5.8 X10E3/UL (ref 3.4–10.8)
WBC # BLD AUTO: 5.9 K/UL (ref 4.1–11.1)
WBC # BLD AUTO: 5.9 K/UL (ref 4.1–11.1)
WBC # BLD AUTO: 6.1 K/UL (ref 4.1–11.1)
WBC # BLD AUTO: 6.1 K/UL (ref 4.1–11.1)
WBC # BLD AUTO: 6.2 K/UL (ref 4.1–11.1)
WBC # BLD AUTO: 6.4 K/UL (ref 4.1–11.1)
WBC # BLD AUTO: 6.4 K/UL (ref 4.1–11.1)
WBC # BLD AUTO: 6.8 K/UL (ref 4.1–11.1)
WBC # BLD AUTO: 6.9 K/UL (ref 4.1–11.1)
WBC # BLD AUTO: 7.3 K/UL (ref 4.1–11.1)
WBC # BLD AUTO: 7.4 K/UL (ref 4.1–11.1)
WBC # BLD AUTO: 7.5 K/UL (ref 4.1–11.1)
WBC # BLD AUTO: 7.6 K/UL (ref 4.1–11.1)
WBC CASTS URNS QL MICRO: ABNORMAL /LPF
WBC MORPH BLD: ABNORMAL
WBC URNS QL MICRO: ABNORMAL /HPF (ref 0–4)
YEAST BUDDING URNS QL: PRESENT
YEAST BUDDING URNS QL: PRESENT

## 2020-01-01 PROCEDURE — 74011250637 HC RX REV CODE- 250/637: Performed by: INTERNAL MEDICINE

## 2020-01-01 PROCEDURE — 74011000258 HC RX REV CODE- 258: Performed by: INTERNAL MEDICINE

## 2020-01-01 PROCEDURE — 74011250636 HC RX REV CODE- 250/636: Performed by: FAMILY MEDICINE

## 2020-01-01 PROCEDURE — 87086 URINE CULTURE/COLONY COUNT: CPT

## 2020-01-01 PROCEDURE — 85610 PROTHROMBIN TIME: CPT

## 2020-01-01 PROCEDURE — 86900 BLOOD TYPING SEROLOGIC ABO: CPT

## 2020-01-01 PROCEDURE — 83605 ASSAY OF LACTIC ACID: CPT

## 2020-01-01 PROCEDURE — 74011250637 HC RX REV CODE- 250/637: Performed by: NURSE PRACTITIONER

## 2020-01-01 PROCEDURE — 71045 X-RAY EXAM CHEST 1 VIEW: CPT

## 2020-01-01 PROCEDURE — 74011250636 HC RX REV CODE- 250/636: Performed by: NURSE PRACTITIONER

## 2020-01-01 PROCEDURE — 97116 GAIT TRAINING THERAPY: CPT

## 2020-01-01 PROCEDURE — 94640 AIRWAY INHALATION TREATMENT: CPT

## 2020-01-01 PROCEDURE — 77030040831 HC BAG URINE DRNG MDII -A

## 2020-01-01 PROCEDURE — 87205 SMEAR GRAM STAIN: CPT

## 2020-01-01 PROCEDURE — 83880 ASSAY OF NATRIURETIC PEPTIDE: CPT

## 2020-01-01 PROCEDURE — 85025 COMPLETE CBC W/AUTO DIFF WBC: CPT

## 2020-01-01 PROCEDURE — 83735 ASSAY OF MAGNESIUM: CPT

## 2020-01-01 PROCEDURE — 83615 LACTATE (LD) (LDH) ENZYME: CPT

## 2020-01-01 PROCEDURE — 93750 INTERROGATION VAD IN PERSON: CPT | Performed by: NURSE PRACTITIONER

## 2020-01-01 PROCEDURE — 81001 URINALYSIS AUTO W/SCOPE: CPT

## 2020-01-01 PROCEDURE — 77030018836 HC SOL IRR NACL ICUM -A

## 2020-01-01 PROCEDURE — 99232 SBSQ HOSP IP/OBS MODERATE 35: CPT | Performed by: NURSE PRACTITIONER

## 2020-01-01 PROCEDURE — 36415 COLL VENOUS BLD VENIPUNCTURE: CPT

## 2020-01-01 PROCEDURE — 97162 PT EVAL MOD COMPLEX 30 MIN: CPT

## 2020-01-01 PROCEDURE — 85730 THROMBOPLASTIN TIME PARTIAL: CPT

## 2020-01-01 PROCEDURE — 80162 ASSAY OF DIGOXIN TOTAL: CPT

## 2020-01-01 PROCEDURE — 65660000001 HC RM ICU INTERMED STEPDOWN

## 2020-01-01 PROCEDURE — 77030005402 HC CATH RAD ART LN KT TELE -B

## 2020-01-01 PROCEDURE — 85027 COMPLETE CBC AUTOMATED: CPT

## 2020-01-01 PROCEDURE — 86880 COOMBS TEST DIRECT: CPT

## 2020-01-01 PROCEDURE — 93306 TTE W/DOPPLER COMPLETE: CPT

## 2020-01-01 PROCEDURE — 99232 SBSQ HOSP IP/OBS MODERATE 35: CPT | Performed by: INTERNAL MEDICINE

## 2020-01-01 PROCEDURE — 93750 INTERROGATION VAD IN PERSON: CPT | Performed by: INTERNAL MEDICINE

## 2020-01-01 PROCEDURE — 85018 HEMOGLOBIN: CPT

## 2020-01-01 PROCEDURE — 85652 RBC SED RATE AUTOMATED: CPT

## 2020-01-01 PROCEDURE — 74011250636 HC RX REV CODE- 250/636: Performed by: INTERNAL MEDICINE

## 2020-01-01 PROCEDURE — 99283 EMERGENCY DEPT VISIT LOW MDM: CPT

## 2020-01-01 PROCEDURE — 74150 CT ABDOMEN W/O CONTRAST: CPT

## 2020-01-01 PROCEDURE — 74011000250 HC RX REV CODE- 250: Performed by: INTERNAL MEDICINE

## 2020-01-01 PROCEDURE — 99231 SBSQ HOSP IP/OBS SF/LOW 25: CPT | Performed by: INTERNAL MEDICINE

## 2020-01-01 PROCEDURE — 74011250637 HC RX REV CODE- 250/637

## 2020-01-01 PROCEDURE — 80053 COMPREHEN METABOLIC PANEL: CPT

## 2020-01-01 PROCEDURE — C1758 CATHETER, URETERAL: HCPCS

## 2020-01-01 PROCEDURE — 84145 PROCALCITONIN (PCT): CPT

## 2020-01-01 PROCEDURE — P9045 ALBUMIN (HUMAN), 5%, 250 ML: HCPCS | Performed by: NURSE PRACTITIONER

## 2020-01-01 PROCEDURE — 74011000258 HC RX REV CODE- 258: Performed by: FAMILY MEDICINE

## 2020-01-01 PROCEDURE — 99233 SBSQ HOSP IP/OBS HIGH 50: CPT | Performed by: INTERNAL MEDICINE

## 2020-01-01 PROCEDURE — 5A09357 ASSISTANCE WITH RESPIRATORY VENTILATION, LESS THAN 24 CONSECUTIVE HOURS, CONTINUOUS POSITIVE AIRWAY PRESSURE: ICD-10-PCS | Performed by: INTERNAL MEDICINE

## 2020-01-01 PROCEDURE — 74011000250 HC RX REV CODE- 250: Performed by: NURSE PRACTITIONER

## 2020-01-01 PROCEDURE — 86870 RBC ANTIBODY IDENTIFICATION: CPT

## 2020-01-01 PROCEDURE — 74011250637 HC RX REV CODE- 250/637: Performed by: UROLOGY

## 2020-01-01 PROCEDURE — 97530 THERAPEUTIC ACTIVITIES: CPT | Performed by: PHYSICAL THERAPIST

## 2020-01-01 PROCEDURE — 96376 TX/PRO/DX INJ SAME DRUG ADON: CPT

## 2020-01-01 PROCEDURE — 86921 COMPATIBILITY TEST INCUBATE: CPT

## 2020-01-01 PROCEDURE — 82746 ASSAY OF FOLIC ACID SERUM: CPT

## 2020-01-01 PROCEDURE — 87040 BLOOD CULTURE FOR BACTERIA: CPT

## 2020-01-01 PROCEDURE — P9047 ALBUMIN (HUMAN), 25%, 50ML: HCPCS | Performed by: NURSE PRACTITIONER

## 2020-01-01 PROCEDURE — 87077 CULTURE AEROBIC IDENTIFY: CPT

## 2020-01-01 PROCEDURE — 96375 TX/PRO/DX INJ NEW DRUG ADDON: CPT

## 2020-01-01 PROCEDURE — 97530 THERAPEUTIC ACTIVITIES: CPT

## 2020-01-01 PROCEDURE — 86902 BLOOD TYPE ANTIGEN DONOR EA: CPT

## 2020-01-01 PROCEDURE — 87186 SC STD MICRODIL/AGAR DIL: CPT

## 2020-01-01 PROCEDURE — 84153 ASSAY OF PSA TOTAL: CPT

## 2020-01-01 PROCEDURE — 77010033678 HC OXYGEN DAILY

## 2020-01-01 PROCEDURE — 77030013744: Performed by: INTERNAL MEDICINE

## 2020-01-01 PROCEDURE — 99285 EMERGENCY DEPT VISIT HI MDM: CPT

## 2020-01-01 PROCEDURE — 74011000258 HC RX REV CODE- 258: Performed by: NURSE PRACTITIONER

## 2020-01-01 PROCEDURE — 86922 COMPATIBILITY TEST ANTIGLOB: CPT

## 2020-01-01 PROCEDURE — 86905 BLOOD TYPING RBC ANTIGENS: CPT

## 2020-01-01 PROCEDURE — 51798 US URINE CAPACITY MEASURE: CPT

## 2020-01-01 PROCEDURE — 74011000250 HC RX REV CODE- 250: Performed by: THORACIC SURGERY (CARDIOTHORACIC VASCULAR SURGERY)

## 2020-01-01 PROCEDURE — 86920 COMPATIBILITY TEST SPIN: CPT

## 2020-01-01 PROCEDURE — 70450 CT HEAD/BRAIN W/O DYE: CPT

## 2020-01-01 PROCEDURE — 84439 ASSAY OF FREE THYROXINE: CPT

## 2020-01-01 PROCEDURE — 77030021707 HC SET IRR FLD WRMR 3M -B: Performed by: UROLOGY

## 2020-01-01 PROCEDURE — 97110 THERAPEUTIC EXERCISES: CPT

## 2020-01-01 PROCEDURE — 80177 DRUG SCRN QUAN LEVETIRACETAM: CPT

## 2020-01-01 PROCEDURE — P9047 ALBUMIN (HUMAN), 25%, 50ML: HCPCS | Performed by: INTERNAL MEDICINE

## 2020-01-01 PROCEDURE — 94762 N-INVAS EAR/PLS OXIMTRY CONT: CPT

## 2020-01-01 PROCEDURE — 77030026438 HC STYL ET INTUB CARD -A: Performed by: ANESTHESIOLOGY

## 2020-01-01 PROCEDURE — 97535 SELF CARE MNGMENT TRAINING: CPT

## 2020-01-01 PROCEDURE — 90471 IMMUNIZATION ADMIN: CPT

## 2020-01-01 PROCEDURE — 77030037442 HC TY LVAD MGMT SYST CMP-B

## 2020-01-01 PROCEDURE — 77030018706 HC CORD MPLR COVD -A: Performed by: UROLOGY

## 2020-01-01 PROCEDURE — 74011250637 HC RX REV CODE- 250/637: Performed by: PSYCHIATRY & NEUROLOGY

## 2020-01-01 PROCEDURE — 36592 COLLECT BLOOD FROM PICC: CPT

## 2020-01-01 PROCEDURE — 82962 GLUCOSE BLOOD TEST: CPT

## 2020-01-01 PROCEDURE — 80076 HEPATIC FUNCTION PANEL: CPT

## 2020-01-01 PROCEDURE — 74011000258 HC RX REV CODE- 258: Performed by: EMERGENCY MEDICINE

## 2020-01-01 PROCEDURE — 80069 RENAL FUNCTION PANEL: CPT

## 2020-01-01 PROCEDURE — 77030040831 HC BAG URINE DRNG MDII -A: Performed by: UROLOGY

## 2020-01-01 PROCEDURE — 77030013797 HC KT TRNSDUC PRSSR EDWD -A

## 2020-01-01 PROCEDURE — 74011250636 HC RX REV CODE- 250/636

## 2020-01-01 PROCEDURE — P9045 ALBUMIN (HUMAN), 5%, 250 ML: HCPCS | Performed by: INTERNAL MEDICINE

## 2020-01-01 PROCEDURE — 74011636637 HC RX REV CODE- 636/637: Performed by: NURSE PRACTITIONER

## 2020-01-01 PROCEDURE — 94760 N-INVAS EAR/PLS OXIMETRY 1: CPT

## 2020-01-01 PROCEDURE — 97165 OT EVAL LOW COMPLEX 30 MIN: CPT

## 2020-01-01 PROCEDURE — 94664 DEMO&/EVAL PT USE INHALER: CPT

## 2020-01-01 PROCEDURE — 3C1ZX8Z IRRIGATION OF INDWELLING DEVICE USING IRRIGATING SUBSTANCE, EXTERNAL APPROACH: ICD-10-PCS | Performed by: THORACIC SURGERY (CARDIOTHORACIC VASCULAR SURGERY)

## 2020-01-01 PROCEDURE — C1751 CATH, INF, PER/CENT/MIDLINE: HCPCS

## 2020-01-01 PROCEDURE — 99223 1ST HOSP IP/OBS HIGH 75: CPT | Performed by: FAMILY MEDICINE

## 2020-01-01 PROCEDURE — 97535 SELF CARE MNGMENT TRAINING: CPT | Performed by: OCCUPATIONAL THERAPIST

## 2020-01-01 PROCEDURE — 83540 ASSAY OF IRON: CPT

## 2020-01-01 PROCEDURE — C1894 INTRO/SHEATH, NON-LASER: HCPCS | Performed by: INTERNAL MEDICINE

## 2020-01-01 PROCEDURE — 77030019905 HC CATH URETH INTMIT MDII -A

## 2020-01-01 PROCEDURE — 82728 ASSAY OF FERRITIN: CPT

## 2020-01-01 PROCEDURE — 93005 ELECTROCARDIOGRAM TRACING: CPT

## 2020-01-01 PROCEDURE — 99221 1ST HOSP IP/OBS SF/LOW 40: CPT | Performed by: NURSE PRACTITIONER

## 2020-01-01 PROCEDURE — 99218 HC RM OBSERVATION: CPT

## 2020-01-01 PROCEDURE — 77030008684 HC TU ET CUF COVD -B: Performed by: ANESTHESIOLOGY

## 2020-01-01 PROCEDURE — 97116 GAIT TRAINING THERAPY: CPT | Performed by: PHYSICAL THERAPIST

## 2020-01-01 PROCEDURE — 94010 BREATHING CAPACITY TEST: CPT

## 2020-01-01 PROCEDURE — 93451 RIGHT HEART CATH: CPT | Performed by: INTERNAL MEDICINE

## 2020-01-01 PROCEDURE — 82607 VITAMIN B-12: CPT

## 2020-01-01 PROCEDURE — 80048 BASIC METABOLIC PNL TOTAL CA: CPT

## 2020-01-01 PROCEDURE — P9016 RBC LEUKOCYTES REDUCED: HCPCS

## 2020-01-01 PROCEDURE — 85007 BL SMEAR W/DIFF WBC COUNT: CPT

## 2020-01-01 PROCEDURE — 82378 CARCINOEMBRYONIC ANTIGEN: CPT

## 2020-01-01 PROCEDURE — 99222 1ST HOSP IP/OBS MODERATE 55: CPT | Performed by: INTERNAL MEDICINE

## 2020-01-01 PROCEDURE — 82533 TOTAL CORTISOL: CPT

## 2020-01-01 PROCEDURE — 74011636320 HC RX REV CODE- 636/320: Performed by: RADIOLOGY

## 2020-01-01 PROCEDURE — 99233 SBSQ HOSP IP/OBS HIGH 50: CPT | Performed by: FAMILY MEDICINE

## 2020-01-01 PROCEDURE — 85247 CLOT FACTOR VIII MULTIMETRIC: CPT

## 2020-01-01 PROCEDURE — 76705 ECHO EXAM OF ABDOMEN: CPT

## 2020-01-01 PROCEDURE — 36430 TRANSFUSION BLD/BLD COMPNT: CPT

## 2020-01-01 PROCEDURE — 94660 CPAP INITIATION&MGMT: CPT

## 2020-01-01 PROCEDURE — 65660000000 HC RM CCU STEPDOWN

## 2020-01-01 PROCEDURE — 96374 THER/PROPH/DIAG INJ IV PUSH: CPT

## 2020-01-01 PROCEDURE — C8929 TTE W OR WO FOL WCON,DOPPLER: HCPCS

## 2020-01-01 PROCEDURE — 74011000250 HC RX REV CODE- 250: Performed by: FAMILY MEDICINE

## 2020-01-01 PROCEDURE — 77030018836 HC SOL IRR NACL ICUM -A: Performed by: UROLOGY

## 2020-01-01 PROCEDURE — 99239 HOSP IP/OBS DSCHRG MGMT >30: CPT | Performed by: INTERNAL MEDICINE

## 2020-01-01 PROCEDURE — 76010000149 HC OR TIME 1 TO 1.5 HR: Performed by: UROLOGY

## 2020-01-01 PROCEDURE — 84134 ASSAY OF PREALBUMIN: CPT

## 2020-01-01 PROCEDURE — 74011000258 HC RX REV CODE- 258: Performed by: RADIOLOGY

## 2020-01-01 PROCEDURE — 76770 US EXAM ABDO BACK WALL COMP: CPT

## 2020-01-01 PROCEDURE — 77030040830 HC CATH URETH FOL MDII -A

## 2020-01-01 PROCEDURE — 99153 MOD SED SAME PHYS/QHP EA: CPT | Performed by: INTERNAL MEDICINE

## 2020-01-01 PROCEDURE — 74011250636 HC RX REV CODE- 250/636: Performed by: EMERGENCY MEDICINE

## 2020-01-01 PROCEDURE — 36593 DECLOT VASCULAR DEVICE: CPT

## 2020-01-01 PROCEDURE — 77030036654 HC CATH URETH FOL COUDE BARD -B

## 2020-01-01 PROCEDURE — 97110 THERAPEUTIC EXERCISES: CPT | Performed by: OCCUPATIONAL THERAPIST

## 2020-01-01 PROCEDURE — 96365 THER/PROPH/DIAG IV INF INIT: CPT

## 2020-01-01 PROCEDURE — 82306 VITAMIN D 25 HYDROXY: CPT

## 2020-01-01 PROCEDURE — 77030011253 HC DRSG HYDRGEL MDII -B

## 2020-01-01 PROCEDURE — 74178 CT ABD&PLV WO CNTR FLWD CNTR: CPT

## 2020-01-01 PROCEDURE — 093K7ZZ CONTROL BLEEDING IN NASAL MUCOSA AND SOFT TISSUE, VIA NATURAL OR ARTIFICIAL OPENING: ICD-10-PCS | Performed by: OTOLARYNGOLOGY

## 2020-01-01 PROCEDURE — 72193 CT PELVIS W/DYE: CPT

## 2020-01-01 PROCEDURE — 84443 ASSAY THYROID STIM HORMONE: CPT

## 2020-01-01 PROCEDURE — 84100 ASSAY OF PHOSPHORUS: CPT

## 2020-01-01 PROCEDURE — 90686 IIV4 VACC NO PRSV 0.5 ML IM: CPT | Performed by: INTERNAL MEDICINE

## 2020-01-01 PROCEDURE — 03HY32Z INSERTION OF MONITORING DEVICE INTO UPPER ARTERY, PERCUTANEOUS APPROACH: ICD-10-PCS | Performed by: ANESTHESIOLOGY

## 2020-01-01 PROCEDURE — 30233N1 TRANSFUSION OF NONAUTOLOGOUS RED BLOOD CELLS INTO PERIPHERAL VEIN, PERCUTANEOUS APPROACH: ICD-10-PCS | Performed by: FAMILY MEDICINE

## 2020-01-01 PROCEDURE — 87070 CULTURE OTHR SPECIMN AEROBIC: CPT

## 2020-01-01 PROCEDURE — 88307 TISSUE EXAM BY PATHOLOGIST: CPT

## 2020-01-01 PROCEDURE — 71275 CT ANGIOGRAPHY CHEST: CPT

## 2020-01-01 PROCEDURE — 97161 PT EVAL LOW COMPLEX 20 MIN: CPT

## 2020-01-01 PROCEDURE — 3336500001 HSPC ELECTION

## 2020-01-01 PROCEDURE — 97166 OT EVAL MOD COMPLEX 45 MIN: CPT

## 2020-01-01 PROCEDURE — 84484 ASSAY OF TROPONIN QUANT: CPT

## 2020-01-01 PROCEDURE — 74011250636 HC RX REV CODE- 250/636: Performed by: STUDENT IN AN ORGANIZED HEALTH CARE EDUCATION/TRAINING PROGRAM

## 2020-01-01 PROCEDURE — 82272 OCCULT BLD FECES 1-3 TESTS: CPT

## 2020-01-01 PROCEDURE — 77030028157 HC ELECTRD BPLR BLDR RWOL -B: Performed by: UROLOGY

## 2020-01-01 PROCEDURE — 82803 BLOOD GASES ANY COMBINATION: CPT

## 2020-01-01 PROCEDURE — 65270000029 HC RM PRIVATE

## 2020-01-01 PROCEDURE — 36600 WITHDRAWAL OF ARTERIAL BLOOD: CPT

## 2020-01-01 PROCEDURE — 77030005513 HC CATH URETH FOL11 MDII -B

## 2020-01-01 PROCEDURE — 0TBB8ZZ EXCISION OF BLADDER, VIA NATURAL OR ARTIFICIAL OPENING ENDOSCOPIC: ICD-10-PCS | Performed by: UROLOGY

## 2020-01-01 PROCEDURE — 76060000034 HC ANESTHESIA 1.5 TO 2 HR: Performed by: UROLOGY

## 2020-01-01 PROCEDURE — 74011000250 HC RX REV CODE- 250: Performed by: OTOLARYNGOLOGY

## 2020-01-01 PROCEDURE — 77030018846 HC SOL IRR STRL H20 ICUM -A: Performed by: UROLOGY

## 2020-01-01 PROCEDURE — 93971 EXTREMITY STUDY: CPT

## 2020-01-01 PROCEDURE — 82525 ASSAY OF COPPER: CPT

## 2020-01-01 PROCEDURE — 88112 CYTOPATH CELL ENHANCE TECH: CPT

## 2020-01-01 PROCEDURE — 0656 HSPC GENERAL INPATIENT

## 2020-01-01 PROCEDURE — 77030029684 HC NEB SM VOL KT MONA -A

## 2020-01-01 PROCEDURE — 99152 MOD SED SAME PHYS/QHP 5/>YRS: CPT | Performed by: INTERNAL MEDICINE

## 2020-01-01 PROCEDURE — A9538 TC99M PYROPHOSPHATE: HCPCS

## 2020-01-01 PROCEDURE — 74011250637 HC RX REV CODE- 250/637: Performed by: OTOLARYNGOLOGY

## 2020-01-01 PROCEDURE — 77030027138 HC INCENT SPIROMETER -A

## 2020-01-01 PROCEDURE — C1892 INTRO/SHEATH,FIXED,PEEL-AWAY: HCPCS | Performed by: INTERNAL MEDICINE

## 2020-01-01 PROCEDURE — 92610 EVALUATE SWALLOWING FUNCTION: CPT

## 2020-01-01 PROCEDURE — 74011000250 HC RX REV CODE- 250: Performed by: UROLOGY

## 2020-01-01 PROCEDURE — 77030005546 HC CATH URETH FOL 3W BARD -A: Performed by: UROLOGY

## 2020-01-01 PROCEDURE — 77030013406 HC CATH CTRL EDWD -B: Performed by: INTERNAL MEDICINE

## 2020-01-01 PROCEDURE — 78815 PET IMAGE W/CT SKULL-THIGH: CPT

## 2020-01-01 PROCEDURE — 77030013038 HC MSK CPAP FISP -A

## 2020-01-01 PROCEDURE — 86860 RBC ANTIBODY ELUTION: CPT

## 2020-01-01 PROCEDURE — C1751 CATH, INF, PER/CENT/MIDLINE: HCPCS | Performed by: INTERNAL MEDICINE

## 2020-01-01 RX ORDER — WARFARIN 1 MG/1
2 TABLET ORAL ONCE
Status: COMPLETED | OUTPATIENT
Start: 2020-01-01 | End: 2020-01-01

## 2020-01-01 RX ORDER — CLONAZEPAM 1 MG/1
0.25 TABLET ORAL 2 TIMES DAILY
Status: DISCONTINUED | OUTPATIENT
Start: 2020-01-01 | End: 2020-01-01 | Stop reason: HOSPADM

## 2020-01-01 RX ORDER — HYDRALAZINE HYDROCHLORIDE 10 MG/1
10 TABLET, FILM COATED ORAL 3 TIMES DAILY
Status: DISCONTINUED | OUTPATIENT
Start: 2020-01-01 | End: 2020-01-01 | Stop reason: HOSPADM

## 2020-01-01 RX ORDER — ALBUMIN HUMAN 50 G/1000ML
12.5 SOLUTION INTRAVENOUS ONCE
Status: COMPLETED | OUTPATIENT
Start: 2020-01-01 | End: 2020-01-01

## 2020-01-01 RX ORDER — ALBUMIN HUMAN 250 G/1000ML
12.5 SOLUTION INTRAVENOUS 2 TIMES DAILY
Status: DISCONTINUED | OUTPATIENT
Start: 2020-01-01 | End: 2020-01-01

## 2020-01-01 RX ORDER — OXYCODONE AND ACETAMINOPHEN 5; 325 MG/1; MG/1
1 TABLET ORAL
Status: DISCONTINUED | OUTPATIENT
Start: 2020-01-01 | End: 2020-01-01 | Stop reason: HOSPADM

## 2020-01-01 RX ORDER — WARFARIN 1 MG/1
3 TABLET ORAL DAILY
Qty: 100 TAB | Refills: 0 | Status: SHIPPED | OUTPATIENT
Start: 2020-01-01 | End: 2020-01-01 | Stop reason: SDUPTHER

## 2020-01-01 RX ORDER — THERA TABS 400 MCG
1 TAB ORAL DAILY
Status: DISCONTINUED | OUTPATIENT
Start: 2020-01-01 | End: 2020-01-01 | Stop reason: HOSPADM

## 2020-01-01 RX ORDER — GENTAMICIN SULFATE 1 MG/G
CREAM TOPICAL DAILY
Status: DISCONTINUED | OUTPATIENT
Start: 2020-01-01 | End: 2020-01-01 | Stop reason: HOSPADM

## 2020-01-01 RX ORDER — CALCIUM POLYCARBOPHIL 625 MG
1 TABLET ORAL DAILY
Status: DISCONTINUED | OUTPATIENT
Start: 2020-01-01 | End: 2020-01-01 | Stop reason: HOSPADM

## 2020-01-01 RX ORDER — WARFARIN 1 MG/1
1 TABLET ORAL ONCE
Status: COMPLETED | OUTPATIENT
Start: 2020-01-01 | End: 2020-01-01

## 2020-01-01 RX ORDER — DIGOXIN 125 MCG
TABLET ORAL
Status: DISPENSED
Start: 2020-01-01 | End: 2020-01-01

## 2020-01-01 RX ORDER — VENLAFAXINE HYDROCHLORIDE 150 MG/1
150 CAPSULE, EXTENDED RELEASE ORAL
Status: DISCONTINUED | OUTPATIENT
Start: 2020-01-01 | End: 2020-01-01 | Stop reason: HOSPADM

## 2020-01-01 RX ORDER — HYDRALAZINE HYDROCHLORIDE 20 MG/ML
10 INJECTION INTRAMUSCULAR; INTRAVENOUS
Status: DISCONTINUED | OUTPATIENT
Start: 2020-01-01 | End: 2020-01-01 | Stop reason: HOSPADM

## 2020-01-01 RX ORDER — BUMETANIDE 0.25 MG/ML
1 INJECTION INTRAMUSCULAR; INTRAVENOUS ONCE
Status: COMPLETED | OUTPATIENT
Start: 2020-01-01 | End: 2020-01-01

## 2020-01-01 RX ORDER — DOXYCYCLINE 100 MG/1
100 CAPSULE ORAL 2 TIMES DAILY
Qty: 28 CAP | Refills: 1 | Status: SHIPPED | OUTPATIENT
Start: 2020-01-01 | End: 2020-01-01

## 2020-01-01 RX ORDER — SODIUM CHLORIDE 9 MG/ML
75 INJECTION, SOLUTION INTRAVENOUS CONTINUOUS
Status: DISCONTINUED | OUTPATIENT
Start: 2020-01-01 | End: 2020-01-01

## 2020-01-01 RX ORDER — LEVETIRACETAM 250 MG/1
250 TABLET ORAL 2 TIMES DAILY
Qty: 60 TAB | Refills: 2 | Status: SHIPPED
Start: 2020-01-01 | End: 2020-01-01 | Stop reason: SDUPTHER

## 2020-01-01 RX ORDER — ALPRAZOLAM 0.25 MG/1
0.25 TABLET ORAL
Status: DISCONTINUED | OUTPATIENT
Start: 2020-01-01 | End: 2020-01-01 | Stop reason: HOSPADM

## 2020-01-01 RX ORDER — FINASTERIDE 5 MG/1
5 TABLET, FILM COATED ORAL DAILY
Status: DISCONTINUED | OUTPATIENT
Start: 2020-01-01 | End: 2020-01-01 | Stop reason: HOSPADM

## 2020-01-01 RX ORDER — MELATONIN
2000 DAILY
Status: DISCONTINUED | OUTPATIENT
Start: 2020-01-01 | End: 2020-01-01 | Stop reason: HOSPADM

## 2020-01-01 RX ORDER — BUDESONIDE 0.5 MG/2ML
500 INHALANT ORAL EVERY EVENING
Status: DISCONTINUED | OUTPATIENT
Start: 2020-01-01 | End: 2020-01-01 | Stop reason: HOSPADM

## 2020-01-01 RX ORDER — HYDROMORPHONE HYDROCHLORIDE 1 MG/ML
1 INJECTION, SOLUTION INTRAMUSCULAR; INTRAVENOUS; SUBCUTANEOUS EVERY 4 HOURS
Status: DISCONTINUED | OUTPATIENT
Start: 2020-01-01 | End: 2020-01-01

## 2020-01-01 RX ORDER — FINASTERIDE 5 MG/1
5 TABLET, FILM COATED ORAL DAILY
Qty: 30 TAB | Refills: 2 | Status: SHIPPED
Start: 2020-01-01 | End: 2020-01-01 | Stop reason: SDUPTHER

## 2020-01-01 RX ORDER — CLONAZEPAM 0.5 MG/1
0.25 TABLET ORAL 3 TIMES DAILY
Status: DISCONTINUED | OUTPATIENT
Start: 2020-01-01 | End: 2020-01-01 | Stop reason: HOSPADM

## 2020-01-01 RX ORDER — CIPROFLOXACIN 750 MG/1
750 TABLET, FILM COATED ORAL 2 TIMES DAILY
Qty: 60 TAB | Refills: 3 | Status: SHIPPED | OUTPATIENT
Start: 2020-01-01

## 2020-01-01 RX ORDER — OLANZAPINE 5 MG/1
5 TABLET ORAL EVERY EVENING
Status: DISCONTINUED | OUTPATIENT
Start: 2020-01-01 | End: 2020-01-01

## 2020-01-01 RX ORDER — WARFARIN 1 MG/1
4 TABLET ORAL ONCE
Status: COMPLETED | OUTPATIENT
Start: 2020-01-01 | End: 2020-01-01

## 2020-01-01 RX ORDER — HEPARIN SODIUM 200 [USP'U]/100ML
INJECTION, SOLUTION INTRAVENOUS
Status: COMPLETED | OUTPATIENT
Start: 2020-01-01 | End: 2020-01-01

## 2020-01-01 RX ORDER — THERA TABS 400 MCG
1 TAB ORAL DAILY
Qty: 90 TAB | Refills: 3 | Status: SHIPPED | OUTPATIENT
Start: 2020-01-01 | End: 2020-01-01

## 2020-01-01 RX ORDER — SODIUM CHLORIDE 0.9 % (FLUSH) 0.9 %
5-40 SYRINGE (ML) INJECTION EVERY 8 HOURS
Status: DISCONTINUED | OUTPATIENT
Start: 2020-01-01 | End: 2020-01-01 | Stop reason: HOSPADM

## 2020-01-01 RX ORDER — MELATONIN
2000 DAILY
Qty: 180 TAB | Refills: 3 | Status: ON HOLD | OUTPATIENT
Start: 2020-01-01 | End: 2020-01-01

## 2020-01-01 RX ORDER — AMOXICILLIN AND CLAVULANATE POTASSIUM 875; 125 MG/1; MG/1
1 TABLET, FILM COATED ORAL 2 TIMES DAILY
Status: DISCONTINUED | OUTPATIENT
Start: 2020-01-01 | End: 2020-01-01 | Stop reason: HOSPADM

## 2020-01-01 RX ORDER — SODIUM CHLORIDE TAB 1 GM 1 G
1 TAB MISCELLANEOUS DAILY
Status: DISCONTINUED | OUTPATIENT
Start: 2020-01-01 | End: 2020-01-01

## 2020-01-01 RX ORDER — SODIUM CHLORIDE 9 MG/ML
250 INJECTION, SOLUTION INTRAVENOUS AS NEEDED
Status: DISCONTINUED | OUTPATIENT
Start: 2020-01-01 | End: 2020-01-01 | Stop reason: HOSPADM

## 2020-01-01 RX ORDER — ALBUMIN HUMAN 50 G/1000ML
25 SOLUTION INTRAVENOUS ONCE
Status: COMPLETED | OUTPATIENT
Start: 2020-01-01 | End: 2020-01-01

## 2020-01-01 RX ORDER — SODIUM CHLORIDE 9 MG/ML
75 INJECTION, SOLUTION INTRAVENOUS CONTINUOUS
Status: DISPENSED | OUTPATIENT
Start: 2020-01-01 | End: 2020-01-01

## 2020-01-01 RX ORDER — SODIUM CHLORIDE 0.9 % (FLUSH) 0.9 %
5-40 SYRINGE (ML) INJECTION AS NEEDED
Status: DISCONTINUED | OUTPATIENT
Start: 2020-01-01 | End: 2020-01-01 | Stop reason: HOSPADM

## 2020-01-01 RX ORDER — POTASSIUM CHLORIDE 750 MG/1
10 TABLET, FILM COATED, EXTENDED RELEASE ORAL DAILY
Status: DISCONTINUED | OUTPATIENT
Start: 2020-01-01 | End: 2020-01-01 | Stop reason: HOSPADM

## 2020-01-01 RX ORDER — MILRINONE LACTATE 0.2 MG/ML
0.12 INJECTION, SOLUTION INTRAVENOUS CONTINUOUS
Status: CANCELLED | OUTPATIENT
Start: 2020-01-01

## 2020-01-01 RX ORDER — HYDRALAZINE HYDROCHLORIDE 10 MG/1
10 TABLET, FILM COATED ORAL 3 TIMES DAILY
Qty: 270 TAB | Refills: 3 | Status: SHIPPED | OUTPATIENT
Start: 2020-01-01 | End: 2020-01-01 | Stop reason: ALTCHOICE

## 2020-01-01 RX ORDER — WARFARIN 1 MG/1
3 TABLET ORAL ONCE
Status: COMPLETED | OUTPATIENT
Start: 2020-01-01 | End: 2020-01-01

## 2020-01-01 RX ORDER — TAMSULOSIN HYDROCHLORIDE 0.4 MG/1
0.8 CAPSULE ORAL
COMMUNITY

## 2020-01-01 RX ORDER — LEVETIRACETAM 250 MG/1
250 TABLET ORAL 2 TIMES DAILY
Status: DISCONTINUED | OUTPATIENT
Start: 2020-01-01 | End: 2020-01-01 | Stop reason: HOSPADM

## 2020-01-01 RX ORDER — BUMETANIDE 1 MG/1
1 TABLET ORAL DAILY
Status: DISCONTINUED | OUTPATIENT
Start: 2020-01-01 | End: 2020-01-01

## 2020-01-01 RX ORDER — LORAZEPAM 2 MG/ML
1 INJECTION INTRAMUSCULAR EVERY 4 HOURS
Status: DISCONTINUED | OUTPATIENT
Start: 2020-01-01 | End: 2020-01-01

## 2020-01-01 RX ORDER — LORAZEPAM 2 MG/ML
4 INJECTION INTRAMUSCULAR
Status: DISCONTINUED | OUTPATIENT
Start: 2020-01-01 | End: 2020-01-01

## 2020-01-01 RX ORDER — AMOXICILLIN AND CLAVULANATE POTASSIUM 875; 125 MG/1; MG/1
1 TABLET, FILM COATED ORAL 2 TIMES DAILY
Qty: 60 TAB | Refills: 2 | Status: SHIPPED | OUTPATIENT
Start: 2020-01-01 | End: 2020-01-01 | Stop reason: SDUPTHER

## 2020-01-01 RX ORDER — ALBUTEROL SULFATE 0.83 MG/ML
2.5 SOLUTION RESPIRATORY (INHALATION)
Status: DISCONTINUED | OUTPATIENT
Start: 2020-01-01 | End: 2020-01-01 | Stop reason: HOSPADM

## 2020-01-01 RX ORDER — ACETYLCYSTEINE 200 MG/ML
1200 SOLUTION ORAL; RESPIRATORY (INHALATION) 3 TIMES DAILY
Status: DISCONTINUED | OUTPATIENT
Start: 2020-01-01 | End: 2020-01-01

## 2020-01-01 RX ORDER — HYDROMORPHONE HCL/0.9% NACL/PF 0.5 MG/ML
PLASTIC BAG, INJECTION (ML) INTRAVENOUS CONTINUOUS
Status: DISCONTINUED | OUTPATIENT
Start: 2020-01-01 | End: 2020-01-01 | Stop reason: HOSPADM

## 2020-01-01 RX ORDER — WARFARIN 4 MG/1
4 TABLET ORAL ONCE
Status: COMPLETED | OUTPATIENT
Start: 2020-01-01 | End: 2020-01-01

## 2020-01-01 RX ORDER — LANOLIN ALCOHOL/MO/W.PET/CERES
800 CREAM (GRAM) TOPICAL 2 TIMES DAILY
Status: DISCONTINUED | OUTPATIENT
Start: 2020-01-01 | End: 2020-01-01 | Stop reason: HOSPADM

## 2020-01-01 RX ORDER — HYDROMORPHONE HYDROCHLORIDE 1 MG/ML
0.5 INJECTION, SOLUTION INTRAMUSCULAR; INTRAVENOUS; SUBCUTANEOUS
Status: DISCONTINUED | OUTPATIENT
Start: 2020-01-01 | End: 2020-01-01

## 2020-01-01 RX ORDER — CLONAZEPAM 0.5 MG/1
0.5 TABLET ORAL 3 TIMES DAILY
Status: DISCONTINUED | OUTPATIENT
Start: 2020-01-01 | End: 2020-01-01

## 2020-01-01 RX ORDER — DIGOXIN 125 MCG
62.5 TABLET ORAL
Status: DISCONTINUED | OUTPATIENT
Start: 2020-01-01 | End: 2020-01-01

## 2020-01-01 RX ORDER — LORAZEPAM 2 MG/ML
0.5 INJECTION INTRAMUSCULAR ONCE
Status: COMPLETED | OUTPATIENT
Start: 2020-01-01 | End: 2020-01-01

## 2020-01-01 RX ORDER — IPRATROPIUM BROMIDE AND ALBUTEROL SULFATE 2.5; .5 MG/3ML; MG/3ML
3 SOLUTION RESPIRATORY (INHALATION)
Status: DISCONTINUED | OUTPATIENT
Start: 2020-01-01 | End: 2020-01-01 | Stop reason: HOSPADM

## 2020-01-01 RX ORDER — LORAZEPAM 2 MG/ML
1 INJECTION INTRAMUSCULAR EVERY 4 HOURS
Status: DISCONTINUED | OUTPATIENT
Start: 2020-01-01 | End: 2020-01-01 | Stop reason: HOSPADM

## 2020-01-01 RX ORDER — LANOLIN ALCOHOL/MO/W.PET/CERES
100 CREAM (GRAM) TOPICAL DAILY
Status: DISCONTINUED | OUTPATIENT
Start: 2020-01-01 | End: 2020-01-01 | Stop reason: HOSPADM

## 2020-01-01 RX ORDER — DIPHENHYDRAMINE HYDROCHLORIDE 50 MG/ML
12.5 INJECTION, SOLUTION INTRAMUSCULAR; INTRAVENOUS
Status: DISCONTINUED | OUTPATIENT
Start: 2020-01-01 | End: 2020-01-01

## 2020-01-01 RX ORDER — DIGOXIN 125 MCG
0.06 TABLET ORAL ONCE
Status: COMPLETED | OUTPATIENT
Start: 2020-01-01 | End: 2020-01-01

## 2020-01-01 RX ORDER — AMOXICILLIN 250 MG
1 CAPSULE ORAL AS NEEDED
Status: DISCONTINUED | OUTPATIENT
Start: 2020-01-01 | End: 2020-01-01 | Stop reason: HOSPADM

## 2020-01-01 RX ORDER — CLONAZEPAM 0.5 MG/1
0.5 TABLET ORAL 2 TIMES DAILY
Status: DISCONTINUED | OUTPATIENT
Start: 2020-01-01 | End: 2020-01-01

## 2020-01-01 RX ORDER — HYDROMORPHONE HYDROCHLORIDE 1 MG/ML
1 INJECTION, SOLUTION INTRAMUSCULAR; INTRAVENOUS; SUBCUTANEOUS
Status: DISCONTINUED | OUTPATIENT
Start: 2020-01-01 | End: 2020-01-01 | Stop reason: HOSPADM

## 2020-01-01 RX ORDER — FENTANYL CITRATE 50 UG/ML
INJECTION, SOLUTION INTRAMUSCULAR; INTRAVENOUS AS NEEDED
Status: DISCONTINUED | OUTPATIENT
Start: 2020-01-01 | End: 2020-01-01 | Stop reason: HOSPADM

## 2020-01-01 RX ORDER — ASPIRIN 81 MG/1
81 TABLET ORAL DAILY
Qty: 90 TAB | Refills: 3 | Status: SHIPPED | OUTPATIENT
Start: 2020-01-01 | End: 2020-01-01

## 2020-01-01 RX ORDER — SUCCINYLCHOLINE CHLORIDE 20 MG/ML
INJECTION INTRAMUSCULAR; INTRAVENOUS AS NEEDED
Status: DISCONTINUED | OUTPATIENT
Start: 2020-01-01 | End: 2020-01-01 | Stop reason: HOSPADM

## 2020-01-01 RX ORDER — DIGOXIN 125 MCG
0.12 TABLET ORAL DAILY
Qty: 90 TAB | Refills: 2 | Status: SHIPPED | OUTPATIENT
Start: 2020-01-01 | End: 2020-01-01

## 2020-01-01 RX ORDER — PANTOPRAZOLE SODIUM 40 MG/1
40 TABLET, DELAYED RELEASE ORAL
Qty: 30 TAB | Refills: 2 | Status: SHIPPED
Start: 2020-01-01 | End: 2020-01-01

## 2020-01-01 RX ORDER — BUDESONIDE 0.5 MG/2ML
500 INHALANT ORAL
Status: DISCONTINUED | OUTPATIENT
Start: 2020-01-01 | End: 2020-01-01 | Stop reason: HOSPADM

## 2020-01-01 RX ORDER — SODIUM CHLORIDE, SODIUM LACTATE, POTASSIUM CHLORIDE, CALCIUM CHLORIDE 600; 310; 30; 20 MG/100ML; MG/100ML; MG/100ML; MG/100ML
INJECTION, SOLUTION INTRAVENOUS
Status: DISCONTINUED | OUTPATIENT
Start: 2020-01-01 | End: 2020-01-01 | Stop reason: HOSPADM

## 2020-01-01 RX ORDER — ROCURONIUM BROMIDE 10 MG/ML
INJECTION, SOLUTION INTRAVENOUS AS NEEDED
Status: DISCONTINUED | OUTPATIENT
Start: 2020-01-01 | End: 2020-01-01 | Stop reason: HOSPADM

## 2020-01-01 RX ORDER — LORAZEPAM 2 MG/1
2 TABLET ORAL ONCE
Status: DISCONTINUED | OUTPATIENT
Start: 2020-01-01 | End: 2020-01-01

## 2020-01-01 RX ORDER — ASPIRIN 81 MG/1
81 TABLET ORAL DAILY
Status: DISCONTINUED | OUTPATIENT
Start: 2020-01-01 | End: 2020-01-01 | Stop reason: HOSPADM

## 2020-01-01 RX ORDER — POTASSIUM CHLORIDE 750 MG/1
10 TABLET, FILM COATED, EXTENDED RELEASE ORAL DAILY
Qty: 30 TAB | Refills: 2
Start: 2020-01-01 | End: 2020-01-01 | Stop reason: SDUPTHER

## 2020-01-01 RX ORDER — CLONAZEPAM 0.5 MG/1
0.25 TABLET ORAL
Status: DISCONTINUED | OUTPATIENT
Start: 2020-01-01 | End: 2020-01-01 | Stop reason: HOSPADM

## 2020-01-01 RX ORDER — ONDANSETRON 2 MG/ML
4 INJECTION INTRAMUSCULAR; INTRAVENOUS
Status: DISCONTINUED | OUTPATIENT
Start: 2020-01-01 | End: 2020-01-01 | Stop reason: HOSPADM

## 2020-01-01 RX ORDER — DEXAMETHASONE SODIUM PHOSPHATE 4 MG/ML
INJECTION, SOLUTION INTRA-ARTICULAR; INTRALESIONAL; INTRAMUSCULAR; INTRAVENOUS; SOFT TISSUE AS NEEDED
Status: DISCONTINUED | OUTPATIENT
Start: 2020-01-01 | End: 2020-01-01 | Stop reason: HOSPADM

## 2020-01-01 RX ORDER — ACETYLCYSTEINE 200 MG/ML
600 SOLUTION ORAL; RESPIRATORY (INHALATION)
Status: DISCONTINUED | OUTPATIENT
Start: 2020-01-01 | End: 2020-01-01

## 2020-01-01 RX ORDER — POTASSIUM CHLORIDE 750 MG/1
10 TABLET, FILM COATED, EXTENDED RELEASE ORAL DAILY
Status: DISCONTINUED | OUTPATIENT
Start: 2020-01-01 | End: 2020-01-01

## 2020-01-01 RX ORDER — LORAZEPAM 2 MG/ML
1 INJECTION INTRAMUSCULAR
Status: DISCONTINUED | OUTPATIENT
Start: 2020-01-01 | End: 2020-01-01

## 2020-01-01 RX ORDER — WARFARIN SODIUM 5 MG/1
5 TABLET ORAL ONCE
Status: COMPLETED | OUTPATIENT
Start: 2020-01-01 | End: 2020-01-01

## 2020-01-01 RX ORDER — HYDROCORTISONE 1 %
CREAM (GRAM) TOPICAL
Status: DISCONTINUED | OUTPATIENT
Start: 2020-01-01 | End: 2020-01-01 | Stop reason: HOSPADM

## 2020-01-01 RX ORDER — CLONAZEPAM 0.5 MG/1
0.25 TABLET ORAL
Status: COMPLETED | OUTPATIENT
Start: 2020-01-01 | End: 2020-01-01

## 2020-01-01 RX ORDER — ATORVASTATIN CALCIUM 20 MG/1
40 TABLET, FILM COATED ORAL DAILY
Status: DISCONTINUED | OUTPATIENT
Start: 2020-01-01 | End: 2020-01-01 | Stop reason: HOSPADM

## 2020-01-01 RX ORDER — GENTAMICIN SULFATE 1 MG/G
OINTMENT TOPICAL
Qty: 30 G | Refills: 0 | Status: SHIPPED | OUTPATIENT
Start: 2020-01-01

## 2020-01-01 RX ORDER — MAGNESIUM SULFATE 1 G/100ML
1 INJECTION INTRAVENOUS ONCE
Status: COMPLETED | OUTPATIENT
Start: 2020-01-01 | End: 2020-01-01

## 2020-01-01 RX ORDER — SODIUM CHLORIDE 0.9 % (FLUSH) 0.9 %
10 SYRINGE (ML) INJECTION
Status: COMPLETED | OUTPATIENT
Start: 2020-01-01 | End: 2020-01-01

## 2020-01-01 RX ORDER — MELATONIN
2000 DAILY
Qty: 60 TAB | Refills: 2 | Status: SHIPPED
Start: 2020-01-01 | End: 2020-01-01 | Stop reason: SDUPTHER

## 2020-01-01 RX ORDER — CLONAZEPAM 0.5 MG/1
0.25 TABLET ORAL 2 TIMES DAILY
Qty: 30 TAB | Refills: 0 | Status: SHIPPED | OUTPATIENT
Start: 2020-01-01 | End: 2020-01-01 | Stop reason: SDUPTHER

## 2020-01-01 RX ORDER — WARFARIN 3 MG/1
3 TABLET ORAL DAILY
COMMUNITY
End: 2020-01-01 | Stop reason: SDUPTHER

## 2020-01-01 RX ORDER — OLANZAPINE 2.5 MG/1
2.5 TABLET ORAL EVERY EVENING
Status: DISCONTINUED | OUTPATIENT
Start: 2020-01-01 | End: 2020-01-01

## 2020-01-01 RX ORDER — TAMSULOSIN HYDROCHLORIDE 0.4 MG/1
0.8 CAPSULE ORAL
Status: DISCONTINUED | OUTPATIENT
Start: 2020-01-01 | End: 2020-01-01 | Stop reason: HOSPADM

## 2020-01-01 RX ORDER — PROPOFOL 10 MG/ML
INJECTION, EMULSION INTRAVENOUS AS NEEDED
Status: DISCONTINUED | OUTPATIENT
Start: 2020-01-01 | End: 2020-01-01 | Stop reason: HOSPADM

## 2020-01-01 RX ORDER — POTASSIUM CHLORIDE 750 MG/1
10 TABLET, FILM COATED, EXTENDED RELEASE ORAL DAILY
Qty: 90 TAB | Refills: 3 | Status: SHIPPED | OUTPATIENT
Start: 2020-01-01 | End: 2020-01-01

## 2020-01-01 RX ORDER — HYDRALAZINE HYDROCHLORIDE 20 MG/ML
20 INJECTION INTRAMUSCULAR; INTRAVENOUS
Status: DISCONTINUED | OUTPATIENT
Start: 2020-01-01 | End: 2020-01-01 | Stop reason: HOSPADM

## 2020-01-01 RX ORDER — FINASTERIDE 5 MG/1
5 TABLET, FILM COATED ORAL ONCE
Status: DISCONTINUED | OUTPATIENT
Start: 2020-01-01 | End: 2020-01-01 | Stop reason: SDUPTHER

## 2020-01-01 RX ORDER — DIGOXIN 125 MCG
0.12 TABLET ORAL EVERY OTHER DAY
Qty: 90 TAB | Refills: 2 | Status: SHIPPED | OUTPATIENT
Start: 2020-01-01

## 2020-01-01 RX ORDER — SODIUM CHLORIDE 9 MG/ML
100 INJECTION, SOLUTION INTRAVENOUS CONTINUOUS
Status: DISCONTINUED | OUTPATIENT
Start: 2020-01-01 | End: 2020-01-01

## 2020-01-01 RX ORDER — BUMETANIDE 0.25 MG/ML
1 INJECTION INTRAMUSCULAR; INTRAVENOUS 2 TIMES DAILY
Status: DISCONTINUED | OUTPATIENT
Start: 2020-01-01 | End: 2020-01-01

## 2020-01-01 RX ORDER — LEVETIRACETAM 250 MG/1
250 TABLET ORAL 2 TIMES DAILY
Qty: 180 TAB | Refills: 3 | Status: SHIPPED | OUTPATIENT
Start: 2020-01-01

## 2020-01-01 RX ORDER — SODIUM CHLORIDE 0.9 % (FLUSH) 0.9 %
5-40 SYRINGE (ML) INJECTION EVERY 8 HOURS
Status: DISCONTINUED | OUTPATIENT
Start: 2020-01-01 | End: 2020-01-01

## 2020-01-01 RX ORDER — DIGOXIN 125 MCG
0.12 TABLET ORAL DAILY
Status: DISCONTINUED | OUTPATIENT
Start: 2020-01-01 | End: 2020-01-01 | Stop reason: HOSPADM

## 2020-01-01 RX ORDER — LORAZEPAM 2 MG/ML
2 INJECTION INTRAMUSCULAR
Status: DISCONTINUED | OUTPATIENT
Start: 2020-01-01 | End: 2020-01-01 | Stop reason: HOSPADM

## 2020-01-01 RX ORDER — GENTAMICIN SULFATE 1 MG/G
CREAM TOPICAL
Status: DISCONTINUED | OUTPATIENT
Start: 2020-01-01 | End: 2020-01-01 | Stop reason: HOSPADM

## 2020-01-01 RX ORDER — ACETYLCYSTEINE 200 MG/ML
600 SOLUTION ORAL; RESPIRATORY (INHALATION) 2 TIMES DAILY
Qty: 999 ML | Refills: 0 | Status: SHIPPED
Start: 2020-01-01 | End: 2020-01-01

## 2020-01-01 RX ORDER — WARFARIN 1 MG/1
3 TABLET ORAL DAILY
Qty: 200 TAB | Refills: 3 | Status: ON HOLD | OUTPATIENT
Start: 2020-01-01 | End: 2020-01-01 | Stop reason: DRUGHIGH

## 2020-01-01 RX ORDER — WARFARIN 1 MG/1
4 TABLET ORAL EVERY EVENING
Status: DISCONTINUED | OUTPATIENT
Start: 2020-01-01 | End: 2020-01-01

## 2020-01-01 RX ORDER — HALOPERIDOL 5 MG/ML
2 INJECTION INTRAMUSCULAR
Status: DISCONTINUED | OUTPATIENT
Start: 2020-01-01 | End: 2020-01-01 | Stop reason: HOSPADM

## 2020-01-01 RX ORDER — FACIAL-BODY WIPES
10 EACH TOPICAL DAILY PRN
Status: DISCONTINUED | OUTPATIENT
Start: 2020-01-01 | End: 2020-01-01 | Stop reason: HOSPADM

## 2020-01-01 RX ORDER — TAMSULOSIN HYDROCHLORIDE 0.4 MG/1
0.4 CAPSULE ORAL DAILY
Qty: 90 CAP | Refills: 3 | Status: SHIPPED | OUTPATIENT
Start: 2020-01-01 | End: 2020-01-01

## 2020-01-01 RX ORDER — ALBUTEROL SULFATE 2.5 MG/.5ML
2.5 SOLUTION RESPIRATORY (INHALATION)
COMMUNITY
End: 2020-01-01 | Stop reason: SDUPTHER

## 2020-01-01 RX ORDER — THERA TABS 400 MCG
1 TAB ORAL DAILY
Qty: 30 TAB | Refills: 2 | Status: SHIPPED | OUTPATIENT
Start: 2020-01-01 | End: 2020-01-01 | Stop reason: SDUPTHER

## 2020-01-01 RX ORDER — BUDESONIDE 0.5 MG/2ML
500 INHALANT ORAL
COMMUNITY

## 2020-01-01 RX ORDER — LIDOCAINE HYDROCHLORIDE 20 MG/ML
JELLY TOPICAL AS NEEDED
Status: DISCONTINUED | OUTPATIENT
Start: 2020-01-01 | End: 2020-01-01 | Stop reason: HOSPADM

## 2020-01-01 RX ORDER — MIDAZOLAM HYDROCHLORIDE 1 MG/ML
INJECTION, SOLUTION INTRAMUSCULAR; INTRAVENOUS AS NEEDED
Status: DISCONTINUED | OUTPATIENT
Start: 2020-01-01 | End: 2020-01-01 | Stop reason: HOSPADM

## 2020-01-01 RX ORDER — IPRATROPIUM BROMIDE AND ALBUTEROL SULFATE 2.5; .5 MG/3ML; MG/3ML
3 SOLUTION RESPIRATORY (INHALATION)
Status: ON HOLD | COMMUNITY
End: 2020-01-01

## 2020-01-01 RX ORDER — WARFARIN 1 MG/1
4 TABLET ORAL ONCE
Status: DISCONTINUED | OUTPATIENT
Start: 2020-01-01 | End: 2020-01-01

## 2020-01-01 RX ORDER — PHENYLEPHRINE HCL IN 0.9% NACL 0.4MG/10ML
SYRINGE (ML) INTRAVENOUS AS NEEDED
Status: DISCONTINUED | OUTPATIENT
Start: 2020-01-01 | End: 2020-01-01 | Stop reason: HOSPADM

## 2020-01-01 RX ORDER — SILDENAFIL CITRATE 20 MG/1
10 TABLET ORAL 3 TIMES DAILY
Status: DISCONTINUED | OUTPATIENT
Start: 2020-01-01 | End: 2020-01-01

## 2020-01-01 RX ORDER — NALOXONE HYDROCHLORIDE 0.4 MG/ML
0.4 INJECTION, SOLUTION INTRAMUSCULAR; INTRAVENOUS; SUBCUTANEOUS AS NEEDED
Status: DISCONTINUED | OUTPATIENT
Start: 2020-01-01 | End: 2020-01-01 | Stop reason: HOSPADM

## 2020-01-01 RX ORDER — ALBUMIN HUMAN 250 G/1000ML
12.5 SOLUTION INTRAVENOUS ONCE
Status: DISCONTINUED | OUTPATIENT
Start: 2020-01-01 | End: 2020-01-01

## 2020-01-01 RX ORDER — ALBUMIN HUMAN 250 G/1000ML
25 SOLUTION INTRAVENOUS EVERY 6 HOURS
Status: COMPLETED | OUTPATIENT
Start: 2020-01-01 | End: 2020-01-01

## 2020-01-01 RX ORDER — ALBUTEROL SULFATE 90 UG/1
2 AEROSOL, METERED RESPIRATORY (INHALATION)
Qty: 1 INHALER | Refills: 3 | Status: SHIPPED | OUTPATIENT
Start: 2020-01-01

## 2020-01-01 RX ORDER — DOXYCYCLINE 100 MG/1
100 CAPSULE ORAL 2 TIMES DAILY
Qty: 28 CAP | Refills: 1 | Status: SHIPPED | OUTPATIENT
Start: 2020-01-01 | End: 2020-01-01 | Stop reason: ALTCHOICE

## 2020-01-01 RX ORDER — ALBUMIN HUMAN 250 G/1000ML
12.5 SOLUTION INTRAVENOUS ONCE
Status: COMPLETED | OUTPATIENT
Start: 2020-01-01 | End: 2020-01-01

## 2020-01-01 RX ORDER — FLUDROCORTISONE ACETATE 0.1 MG/1
0.1 TABLET ORAL DAILY
Status: DISCONTINUED | OUTPATIENT
Start: 2020-01-01 | End: 2020-01-01

## 2020-01-01 RX ORDER — BUDESONIDE 0.5 MG/2ML
500 INHALANT ORAL 2 TIMES DAILY
Qty: 999 EACH | Refills: 0 | Status: SHIPPED
Start: 2020-01-01 | End: 2020-01-01

## 2020-01-01 RX ORDER — FLUDROCORTISONE ACETATE 0.1 MG/1
TABLET ORAL
Status: DISPENSED
Start: 2020-01-01 | End: 2020-01-01

## 2020-01-01 RX ORDER — WARFARIN 1 MG/1
TABLET ORAL
Status: DISPENSED
Start: 2020-01-01 | End: 2020-01-01

## 2020-01-01 RX ORDER — FLUTICASONE PROPIONATE 50 MCG
2 SPRAY, SUSPENSION (ML) NASAL DAILY
Status: DISCONTINUED | OUTPATIENT
Start: 2020-01-01 | End: 2020-01-01 | Stop reason: HOSPADM

## 2020-01-01 RX ORDER — VENLAFAXINE HYDROCHLORIDE 150 MG/1
150 CAPSULE, EXTENDED RELEASE ORAL
Qty: 30 CAP | Refills: 2 | Status: SHIPPED
Start: 2020-01-01 | End: 2020-01-01 | Stop reason: SDUPTHER

## 2020-01-01 RX ORDER — ACETYLCYSTEINE 200 MG/ML
600 SOLUTION ORAL; RESPIRATORY (INHALATION)
Status: DISCONTINUED | OUTPATIENT
Start: 2020-01-01 | End: 2020-01-01 | Stop reason: HOSPADM

## 2020-01-01 RX ORDER — CLONAZEPAM 0.5 MG/1
0.25 TABLET ORAL ONCE
Status: COMPLETED | OUTPATIENT
Start: 2020-01-01 | End: 2020-01-01

## 2020-01-01 RX ORDER — LEVOFLOXACIN 5 MG/ML
750 INJECTION, SOLUTION INTRAVENOUS EVERY 24 HOURS
Status: DISCONTINUED | OUTPATIENT
Start: 2020-01-01 | End: 2020-01-01

## 2020-01-01 RX ORDER — OCTREOTIDE ACETATE 50 UG/ML
25 INJECTION, SOLUTION INTRAVENOUS; SUBCUTANEOUS 3 TIMES DAILY
Qty: 999 ML | Refills: 1 | Status: SHIPPED
Start: 2020-01-01 | End: 2020-01-01

## 2020-01-01 RX ORDER — ATORVASTATIN CALCIUM 40 MG/1
40 TABLET, FILM COATED ORAL DAILY
Qty: 90 TAB | Refills: 3 | Status: SHIPPED | OUTPATIENT
Start: 2020-01-01 | End: 2020-01-01 | Stop reason: SINTOL

## 2020-01-01 RX ORDER — CLONAZEPAM 0.5 MG/1
0.5 TABLET ORAL
Status: DISCONTINUED | OUTPATIENT
Start: 2020-01-01 | End: 2020-01-01

## 2020-01-01 RX ORDER — POTASSIUM CHLORIDE 750 MG/1
20 TABLET, FILM COATED, EXTENDED RELEASE ORAL
Status: COMPLETED | OUTPATIENT
Start: 2020-01-01 | End: 2020-01-01

## 2020-01-01 RX ORDER — DIGOXIN 125 MCG
0.12 TABLET ORAL EVERY OTHER DAY
Status: DISCONTINUED | OUTPATIENT
Start: 2020-01-01 | End: 2020-01-01 | Stop reason: HOSPADM

## 2020-01-01 RX ORDER — HYDRALAZINE HYDROCHLORIDE 20 MG/ML
5 INJECTION INTRAMUSCULAR; INTRAVENOUS
Status: DISCONTINUED | OUTPATIENT
Start: 2020-01-01 | End: 2020-01-01

## 2020-01-01 RX ORDER — ACETAMINOPHEN 325 MG/1
650 TABLET ORAL
Status: DISCONTINUED | OUTPATIENT
Start: 2020-01-01 | End: 2020-01-01 | Stop reason: HOSPADM

## 2020-01-01 RX ORDER — ARFORMOTEROL TARTRATE 15 UG/2ML
15 SOLUTION RESPIRATORY (INHALATION)
Status: DISCONTINUED | OUTPATIENT
Start: 2020-01-01 | End: 2020-01-01 | Stop reason: HOSPADM

## 2020-01-01 RX ORDER — SACUBITRIL AND VALSARTAN 24; 26 MG/1; MG/1
1 TABLET, FILM COATED ORAL 2 TIMES DAILY
Qty: 180 TAB | Refills: 3 | Status: SHIPPED | OUTPATIENT
Start: 2020-01-01 | End: 2020-01-01 | Stop reason: SINTOL

## 2020-01-01 RX ORDER — FUROSEMIDE 10 MG/ML
40 INJECTION INTRAMUSCULAR; INTRAVENOUS
Status: DISCONTINUED | OUTPATIENT
Start: 2020-01-01 | End: 2020-01-01

## 2020-01-01 RX ORDER — WARFARIN 3 MG/1
3 TABLET ORAL DAILY
Qty: 120 TAB | Refills: 3 | Status: ON HOLD | OUTPATIENT
Start: 2020-01-01 | End: 2020-01-01 | Stop reason: DRUGHIGH

## 2020-01-01 RX ORDER — LIDOCAINE HYDROCHLORIDE 20 MG/ML
INJECTION, SOLUTION EPIDURAL; INFILTRATION; INTRACAUDAL; PERINEURAL AS NEEDED
Status: DISCONTINUED | OUTPATIENT
Start: 2020-01-01 | End: 2020-01-01 | Stop reason: HOSPADM

## 2020-01-01 RX ORDER — POTASSIUM CHLORIDE 750 MG/1
10 TABLET, FILM COATED, EXTENDED RELEASE ORAL DAILY
Qty: 30 TAB | Refills: 2 | Status: SHIPPED
Start: 2020-01-01 | End: 2020-01-01

## 2020-01-01 RX ORDER — ATORVASTATIN CALCIUM 40 MG/1
TABLET, FILM COATED ORAL DAILY
COMMUNITY
End: 2020-01-01 | Stop reason: SDUPTHER

## 2020-01-01 RX ORDER — SODIUM BICARBONATE 650 MG/1
650 TABLET ORAL 2 TIMES DAILY
Status: DISCONTINUED | OUTPATIENT
Start: 2020-01-01 | End: 2020-01-01 | Stop reason: HOSPADM

## 2020-01-01 RX ORDER — SILVER NITRATE 38.21; 12.74 MG/1; MG/1
2 STICK TOPICAL ONCE
Status: COMPLETED | OUTPATIENT
Start: 2020-01-01 | End: 2020-01-01

## 2020-01-01 RX ORDER — AMOXICILLIN AND CLAVULANATE POTASSIUM 875; 125 MG/1; MG/1
1 TABLET, FILM COATED ORAL 2 TIMES DAILY
Qty: 60 TAB | Refills: 3 | Status: SHIPPED | OUTPATIENT
Start: 2020-01-01 | End: 2020-01-01

## 2020-01-01 RX ORDER — ACETAMINOPHEN 650 MG/1
650 SUPPOSITORY RECTAL
Status: DISCONTINUED | OUTPATIENT
Start: 2020-01-01 | End: 2020-01-01 | Stop reason: HOSPADM

## 2020-01-01 RX ORDER — FLUDROCORTISONE ACETATE 0.1 MG/1
0.1 TABLET ORAL DAILY
Status: DISCONTINUED | OUTPATIENT
Start: 2020-01-01 | End: 2020-01-01 | Stop reason: HOSPADM

## 2020-01-01 RX ORDER — FLUDROCORTISONE ACETATE 0.1 MG/1
0.1 TABLET ORAL DAILY
Qty: 30 TAB | Refills: 2 | Status: SHIPPED
Start: 2020-01-01 | End: 2020-01-01

## 2020-01-01 RX ORDER — CIPROFLOXACIN 750 MG/1
750 TABLET, FILM COATED ORAL 2 TIMES DAILY
Qty: 60 TAB | Refills: 2 | Status: SHIPPED | OUTPATIENT
Start: 2020-01-01 | End: 2020-01-01 | Stop reason: SDUPTHER

## 2020-01-01 RX ORDER — LANOLIN ALCOHOL/MO/W.PET/CERES
400 CREAM (GRAM) TOPICAL 2 TIMES DAILY
Status: DISCONTINUED | OUTPATIENT
Start: 2020-01-01 | End: 2020-01-01

## 2020-01-01 RX ORDER — FINASTERIDE 5 MG/1
5 TABLET, FILM COATED ORAL DAILY
Qty: 90 TAB | Refills: 3 | Status: SHIPPED | OUTPATIENT
Start: 2020-01-01

## 2020-01-01 RX ORDER — POTASSIUM CHLORIDE 750 MG/1
20 TABLET, FILM COATED, EXTENDED RELEASE ORAL DAILY
Status: DISCONTINUED | OUTPATIENT
Start: 2020-01-01 | End: 2020-01-01

## 2020-01-01 RX ORDER — BUMETANIDE 1 MG/1
0.5 TABLET ORAL ONCE
Status: COMPLETED | OUTPATIENT
Start: 2020-01-01 | End: 2020-01-01

## 2020-01-01 RX ORDER — HYDRALAZINE HYDROCHLORIDE 10 MG/1
TABLET, FILM COATED ORAL EVERY 8 HOURS
COMMUNITY
End: 2020-01-01 | Stop reason: SDUPTHER

## 2020-01-01 RX ORDER — DIGOXIN 125 MCG
0.06 TABLET ORAL DAILY
Status: DISCONTINUED | OUTPATIENT
Start: 2020-01-01 | End: 2020-01-01

## 2020-01-01 RX ORDER — TADALAFIL 2.5 MG/1
2.5 TABLET ORAL
COMMUNITY

## 2020-01-01 RX ORDER — BUMETANIDE 0.25 MG/ML
2 INJECTION INTRAMUSCULAR; INTRAVENOUS ONCE
Status: COMPLETED | OUTPATIENT
Start: 2020-01-01 | End: 2020-01-01

## 2020-01-01 RX ORDER — SODIUM CHLORIDE TAB 1 GM 1 G
1 TAB MISCELLANEOUS 2 TIMES DAILY WITH MEALS
Status: DISCONTINUED | OUTPATIENT
Start: 2020-01-01 | End: 2020-01-01

## 2020-01-01 RX ORDER — LIDOCAINE HYDROCHLORIDE 10 MG/ML
INJECTION INFILTRATION; PERINEURAL AS NEEDED
Status: DISCONTINUED | OUTPATIENT
Start: 2020-01-01 | End: 2020-01-01 | Stop reason: HOSPADM

## 2020-01-01 RX ORDER — HYDRALAZINE HYDROCHLORIDE 20 MG/ML
10 INJECTION INTRAMUSCULAR; INTRAVENOUS
Status: DISCONTINUED | OUTPATIENT
Start: 2020-01-01 | End: 2020-01-01

## 2020-01-01 RX ORDER — CLONAZEPAM 0.5 MG/1
0.25 TABLET ORAL 3 TIMES DAILY
Qty: 90 TAB | Refills: 2 | Status: SHIPPED
Start: 2020-01-01 | End: 2020-01-01 | Stop reason: SDUPTHER

## 2020-01-01 RX ORDER — AMOXICILLIN AND CLAVULANATE POTASSIUM 875; 125 MG/1; MG/1
1 TABLET, FILM COATED ORAL 2 TIMES DAILY
Qty: 60 TAB | Refills: 5 | Status: SHIPPED
Start: 2020-01-01 | End: 2020-01-01 | Stop reason: ALTCHOICE

## 2020-01-01 RX ORDER — POTASSIUM CHLORIDE 750 MG/1
20 TABLET, FILM COATED, EXTENDED RELEASE ORAL 2 TIMES DAILY
Status: DISCONTINUED | OUTPATIENT
Start: 2020-01-01 | End: 2020-01-01

## 2020-01-01 RX ORDER — OXYMETAZOLINE HCL 0.05 %
2 SPRAY, NON-AEROSOL (ML) NASAL
Status: DISCONTINUED | OUTPATIENT
Start: 2020-01-01 | End: 2020-01-01 | Stop reason: HOSPADM

## 2020-01-01 RX ORDER — MIDODRINE HYDROCHLORIDE 5 MG/1
5 TABLET ORAL
Status: DISCONTINUED | OUTPATIENT
Start: 2020-01-01 | End: 2020-01-01

## 2020-01-01 RX ORDER — LANOLIN ALCOHOL/MO/W.PET/CERES
800 CREAM (GRAM) TOPICAL 2 TIMES DAILY
Qty: 60 TAB | Refills: 2 | Status: SHIPPED
Start: 2020-01-01 | End: 2020-01-01 | Stop reason: SDUPTHER

## 2020-01-01 RX ORDER — CLONAZEPAM 0.5 MG/1
0.5 TABLET ORAL ONCE
Status: COMPLETED | OUTPATIENT
Start: 2020-01-01 | End: 2020-01-01

## 2020-01-01 RX ORDER — FLUDROCORTISONE ACETATE 0.1 MG/1
0.1 TABLET ORAL 2 TIMES DAILY
Status: DISCONTINUED | OUTPATIENT
Start: 2020-01-01 | End: 2020-01-01

## 2020-01-01 RX ORDER — DIGOXIN 125 MCG
0.12 TABLET ORAL DAILY
Qty: 90 TAB | Refills: 3 | Status: SHIPPED | OUTPATIENT
Start: 2020-01-01 | End: 2020-01-01

## 2020-01-01 RX ORDER — CLONAZEPAM 0.5 MG/1
0.25 TABLET ORAL 2 TIMES DAILY
Qty: 30 TAB | Refills: 2 | Status: SHIPPED | OUTPATIENT
Start: 2020-01-01 | End: 2020-01-01

## 2020-01-01 RX ORDER — IPRATROPIUM BROMIDE AND ALBUTEROL SULFATE 2.5; .5 MG/3ML; MG/3ML
3 SOLUTION RESPIRATORY (INHALATION)
Status: DISCONTINUED | OUTPATIENT
Start: 2020-01-01 | End: 2020-01-01

## 2020-01-01 RX ORDER — ARFORMOTEROL TARTRATE 15 UG/2ML
15 SOLUTION RESPIRATORY (INHALATION) 2 TIMES DAILY
Qty: 999 VIAL | Refills: 0 | Status: SHIPPED
Start: 2020-01-01 | End: 2020-01-01

## 2020-01-01 RX ORDER — ALBUMIN HUMAN 250 G/1000ML
12.5 SOLUTION INTRAVENOUS DAILY
Status: DISCONTINUED | OUTPATIENT
Start: 2020-01-01 | End: 2020-01-01

## 2020-01-01 RX ORDER — ALBUMIN HUMAN 50 G/1000ML
12.5 SOLUTION INTRAVENOUS DAILY
Status: DISCONTINUED | OUTPATIENT
Start: 2020-01-01 | End: 2020-01-01

## 2020-01-01 RX ORDER — LANOLIN ALCOHOL/MO/W.PET/CERES
800 CREAM (GRAM) TOPICAL 2 TIMES DAILY
Qty: 360 TAB | Refills: 3 | Status: SHIPPED | OUTPATIENT
Start: 2020-01-01

## 2020-01-01 RX ORDER — AMOXICILLIN AND CLAVULANATE POTASSIUM 875; 125 MG/1; MG/1
1 TABLET, FILM COATED ORAL 2 TIMES DAILY
Qty: 20 TAB | Refills: 0 | Status: SHIPPED | OUTPATIENT
Start: 2020-01-01 | End: 2020-01-01 | Stop reason: SDUPTHER

## 2020-01-01 RX ORDER — SILDENAFIL CITRATE 20 MG/1
20 TABLET ORAL 3 TIMES DAILY
Status: DISCONTINUED | OUTPATIENT
Start: 2020-01-01 | End: 2020-01-01

## 2020-01-01 RX ORDER — BUDESONIDE 0.5 MG/2ML
500 INHALANT ORAL EVERY EVENING
Status: ON HOLD | COMMUNITY
End: 2020-01-01

## 2020-01-01 RX ORDER — DOCUSATE SODIUM 100 MG/1
100 CAPSULE, LIQUID FILLED ORAL 2 TIMES DAILY
Status: DISCONTINUED | OUTPATIENT
Start: 2020-01-01 | End: 2020-01-01

## 2020-01-01 RX ORDER — LORAZEPAM 2 MG/ML
2 INJECTION INTRAMUSCULAR ONCE
Status: DISCONTINUED | OUTPATIENT
Start: 2020-01-01 | End: 2020-01-01 | Stop reason: HOSPADM

## 2020-01-01 RX ORDER — CLONAZEPAM 0.5 MG/1
0.25 TABLET ORAL
Status: DISCONTINUED | OUTPATIENT
Start: 2020-01-01 | End: 2020-01-01

## 2020-01-01 RX ORDER — LANOLIN ALCOHOL/MO/W.PET/CERES
400 CREAM (GRAM) TOPICAL DAILY
Status: DISCONTINUED | OUTPATIENT
Start: 2020-01-01 | End: 2020-01-01

## 2020-01-01 RX ORDER — ONDANSETRON 2 MG/ML
INJECTION INTRAMUSCULAR; INTRAVENOUS AS NEEDED
Status: DISCONTINUED | OUTPATIENT
Start: 2020-01-01 | End: 2020-01-01 | Stop reason: HOSPADM

## 2020-01-01 RX ORDER — ALBUMIN HUMAN 50 G/1000ML
SOLUTION INTRAVENOUS
Status: DISPENSED
Start: 2020-01-01 | End: 2020-01-01

## 2020-01-01 RX ORDER — VENLAFAXINE HYDROCHLORIDE 150 MG/1
150 CAPSULE, EXTENDED RELEASE ORAL
Qty: 90 CAP | Refills: 3 | Status: SHIPPED | OUTPATIENT
Start: 2020-01-01

## 2020-01-01 RX ORDER — SUCRALFATE 1 G/1
1 TABLET ORAL
Qty: 120 TAB | Refills: 2 | Status: SHIPPED
Start: 2020-01-01 | End: 2020-01-01

## 2020-01-01 RX ORDER — CIPROFLOXACIN 750 MG/1
750 TABLET, FILM COATED ORAL 2 TIMES DAILY
Qty: 60 TAB | Refills: 3 | Status: SHIPPED
Start: 2020-01-01 | End: 2020-01-01 | Stop reason: SDUPTHER

## 2020-01-01 RX ORDER — AMOXICILLIN AND CLAVULANATE POTASSIUM 875; 125 MG/1; MG/1
1 TABLET, FILM COATED ORAL 2 TIMES DAILY
Qty: 40 TAB | Refills: 2 | Status: SHIPPED | OUTPATIENT
Start: 2020-01-01 | End: 2020-01-01 | Stop reason: SDUPTHER

## 2020-01-01 RX ORDER — WARFARIN SODIUM 5 MG/1
5 TABLET ORAL DAILY
COMMUNITY
End: 2020-01-01

## 2020-01-01 RX ORDER — MIDODRINE HYDROCHLORIDE 5 MG/1
2.5 TABLET ORAL ONCE
Status: COMPLETED | OUTPATIENT
Start: 2020-01-01 | End: 2020-01-01

## 2020-01-01 RX ORDER — LEVETIRACETAM 250 MG/1
250 TABLET ORAL EVERY 12 HOURS
Status: DISCONTINUED | OUTPATIENT
Start: 2020-01-01 | End: 2020-01-01 | Stop reason: HOSPADM

## 2020-01-01 RX ORDER — ALBUTEROL SULFATE 90 UG/1
2 AEROSOL, METERED RESPIRATORY (INHALATION)
Qty: 1 INHALER | Refills: 3 | Status: SHIPPED | OUTPATIENT
Start: 2020-01-01 | End: 2020-01-01 | Stop reason: SDUPTHER

## 2020-01-01 RX ORDER — DIGOXIN 125 MCG
0.12 TABLET ORAL DAILY
Qty: 90 TAB | Refills: 3 | Status: SHIPPED | OUTPATIENT
Start: 2020-01-01 | End: 2020-01-01 | Stop reason: SDUPTHER

## 2020-01-01 RX ORDER — ACETYLCYSTEINE 200 MG/ML
1200 SOLUTION ORAL; RESPIRATORY (INHALATION)
Status: DISCONTINUED | OUTPATIENT
Start: 2020-01-01 | End: 2020-01-01

## 2020-01-01 RX ORDER — DOCUSATE SODIUM 100 MG/1
100 CAPSULE, LIQUID FILLED ORAL 2 TIMES DAILY
Status: DISCONTINUED | OUTPATIENT
Start: 2020-01-01 | End: 2020-01-01 | Stop reason: HOSPADM

## 2020-01-01 RX ORDER — BISACODYL 5 MG
5 TABLET, DELAYED RELEASE (ENTERIC COATED) ORAL DAILY PRN
Status: DISCONTINUED | OUTPATIENT
Start: 2020-01-01 | End: 2020-01-01 | Stop reason: HOSPADM

## 2020-01-01 RX ORDER — VENLAFAXINE HYDROCHLORIDE 37.5 MG/1
75 CAPSULE, EXTENDED RELEASE ORAL
Status: DISCONTINUED | OUTPATIENT
Start: 2020-01-01 | End: 2020-01-01

## 2020-01-01 RX ORDER — MIDODRINE HYDROCHLORIDE 5 MG/1
2.5 TABLET ORAL 2 TIMES DAILY WITH MEALS
Status: DISCONTINUED | OUTPATIENT
Start: 2020-01-01 | End: 2020-01-01

## 2020-01-01 RX ORDER — LORAZEPAM 2 MG/ML
INJECTION INTRAMUSCULAR
Status: DISCONTINUED
Start: 2020-01-01 | End: 2020-01-01 | Stop reason: HOSPADM

## 2020-01-01 RX ORDER — WARFARIN 1 MG/1
1 TABLET ORAL EVERY EVENING
Status: DISCONTINUED | OUTPATIENT
Start: 2020-01-01 | End: 2020-01-01 | Stop reason: DRUGHIGH

## 2020-01-01 RX ORDER — LORAZEPAM 2 MG/ML
4 INJECTION INTRAMUSCULAR
Status: DISCONTINUED | OUTPATIENT
Start: 2020-01-01 | End: 2020-01-01 | Stop reason: HOSPADM

## 2020-01-01 RX ORDER — MIDODRINE HYDROCHLORIDE 5 MG/1
5 TABLET ORAL 2 TIMES DAILY WITH MEALS
Status: DISCONTINUED | OUTPATIENT
Start: 2020-01-01 | End: 2020-01-01

## 2020-01-01 RX ORDER — LANOLIN ALCOHOL/MO/W.PET/CERES
800 CREAM (GRAM) TOPICAL 2 TIMES DAILY
Status: DISCONTINUED | OUTPATIENT
Start: 2020-01-01 | End: 2020-01-01

## 2020-01-01 RX ORDER — LANOLIN ALCOHOL/MO/W.PET/CERES
800 CREAM (GRAM) TOPICAL 2 TIMES DAILY
Qty: 360 TAB | Refills: 3 | Status: SHIPPED | OUTPATIENT
Start: 2020-01-01 | End: 2020-01-01 | Stop reason: SDUPTHER

## 2020-01-01 RX ORDER — CIPROFLOXACIN 500 MG/1
TABLET ORAL 2 TIMES DAILY
COMMUNITY
Start: 2020-01-01 | End: 2020-01-01

## 2020-01-01 RX ORDER — GLYCOPYRROLATE 0.2 MG/ML
0.2 INJECTION INTRAMUSCULAR; INTRAVENOUS
Status: DISCONTINUED | OUTPATIENT
Start: 2020-01-01 | End: 2020-01-01

## 2020-01-01 RX ORDER — IPRATROPIUM BROMIDE AND ALBUTEROL SULFATE 2.5; .5 MG/3ML; MG/3ML
3 SOLUTION RESPIRATORY (INHALATION) 3 TIMES DAILY
Qty: 30 NEBULE | Refills: 0 | Status: SHIPPED
Start: 2020-01-01 | End: 2020-01-01

## 2020-01-01 RX ORDER — BUDESONIDE 0.5 MG/2ML
500 INHALANT ORAL EVERY EVENING
Status: DISCONTINUED | OUTPATIENT
Start: 2020-01-01 | End: 2020-01-01

## 2020-01-01 RX ORDER — GLYCOPYRROLATE 0.2 MG/ML
0.2 INJECTION INTRAMUSCULAR; INTRAVENOUS EVERY 4 HOURS
Status: DISCONTINUED | OUTPATIENT
Start: 2020-01-01 | End: 2020-01-01 | Stop reason: HOSPADM

## 2020-01-01 RX ORDER — CLONAZEPAM 0.5 MG/1
0.25 TABLET ORAL
Qty: 30 TAB | Refills: 2 | Status: ON HOLD
Start: 2020-01-01 | End: 2020-01-01 | Stop reason: ALTCHOICE

## 2020-01-01 RX ADMIN — WARFARIN SODIUM 4 MG: 1 TABLET ORAL at 16:56

## 2020-01-01 RX ADMIN — Medication 10 ML: at 06:31

## 2020-01-01 RX ADMIN — MAGNESIUM OXIDE TAB 400 MG (241.3 MG ELEMENTAL MG) 800 MG: 400 (241.3 MG) TAB at 09:06

## 2020-01-01 RX ADMIN — OCTREOTIDE ACETATE 25 MCG: 50 INJECTION, SOLUTION INTRAVENOUS; SUBCUTANEOUS at 16:17

## 2020-01-01 RX ADMIN — SODIUM BICARBONATE 650 MG: 650 TABLET ORAL at 08:13

## 2020-01-01 RX ADMIN — Medication 10 ML: at 13:54

## 2020-01-01 RX ADMIN — Medication 10 ML: at 21:41

## 2020-01-01 RX ADMIN — GENTAMICIN SULFATE: 1 CREAM TOPICAL at 15:41

## 2020-01-01 RX ADMIN — MIDODRINE HYDROCHLORIDE 5 MG: 5 TABLET ORAL at 16:23

## 2020-01-01 RX ADMIN — MELATONIN 2 TABLET: at 09:44

## 2020-01-01 RX ADMIN — MAGNESIUM OXIDE TAB 400 MG (241.3 MG ELEMENTAL MG) 800 MG: 400 (241.3 MG) TAB at 17:40

## 2020-01-01 RX ADMIN — MEROPENEM 500 MG: 500 INJECTION, POWDER, FOR SOLUTION INTRAVENOUS at 09:09

## 2020-01-01 RX ADMIN — MAGNESIUM OXIDE TAB 400 MG (241.3 MG ELEMENTAL MG) 800 MG: 400 (241.3 MG) TAB at 19:09

## 2020-01-01 RX ADMIN — BUDESONIDE 500 MCG: 0.5 INHALANT RESPIRATORY (INHALATION) at 08:42

## 2020-01-01 RX ADMIN — CEFEPIME HYDROCHLORIDE 2 G: 2 INJECTION, POWDER, FOR SOLUTION INTRAVENOUS at 16:23

## 2020-01-01 RX ADMIN — SILDENAFIL CITRATE 20 MG: 20 TABLET ORAL at 17:21

## 2020-01-01 RX ADMIN — CEFEPIME HYDROCHLORIDE 2 G: 2 INJECTION, POWDER, FOR SOLUTION INTRAVENOUS at 23:25

## 2020-01-01 RX ADMIN — TAMSULOSIN HYDROCHLORIDE 0.8 MG: 0.4 CAPSULE ORAL at 21:17

## 2020-01-01 RX ADMIN — ARFORMOTEROL TARTRATE 15 MCG: 15 SOLUTION RESPIRATORY (INHALATION) at 07:06

## 2020-01-01 RX ADMIN — DOCUSATE SODIUM 100 MG: 100 CAPSULE, LIQUID FILLED ORAL at 09:15

## 2020-01-01 RX ADMIN — MAGNESIUM OXIDE TAB 400 MG (241.3 MG ELEMENTAL MG) 800 MG: 400 (241.3 MG) TAB at 17:11

## 2020-01-01 RX ADMIN — Medication 1 G: at 09:55

## 2020-01-01 RX ADMIN — ALBUMIN (HUMAN) 12.5 G: 0.25 INJECTION, SOLUTION INTRAVENOUS at 12:05

## 2020-01-01 RX ADMIN — ACETYLCYSTEINE 600 MG: 200 SOLUTION ORAL; RESPIRATORY (INHALATION) at 07:03

## 2020-01-01 RX ADMIN — Medication 10 ML: at 22:35

## 2020-01-01 RX ADMIN — CLONAZEPAM 0.5 MG: 0.5 TABLET ORAL at 11:23

## 2020-01-01 RX ADMIN — MEROPENEM 500 MG: 500 INJECTION, POWDER, FOR SOLUTION INTRAVENOUS at 23:41

## 2020-01-01 RX ADMIN — SUCRALFATE 1 G: 1 TABLET ORAL at 22:42

## 2020-01-01 RX ADMIN — SILDENAFIL CITRATE 10 MG: 20 TABLET ORAL at 21:04

## 2020-01-01 RX ADMIN — OCTREOTIDE ACETATE 25 MCG: 50 INJECTION, SOLUTION INTRAVENOUS; SUBCUTANEOUS at 21:05

## 2020-01-01 RX ADMIN — CLONAZEPAM 0.25 MG: 0.5 TABLET ORAL at 21:30

## 2020-01-01 RX ADMIN — CEFEPIME HYDROCHLORIDE 2 G: 2 INJECTION, POWDER, FOR SOLUTION INTRAVENOUS at 09:38

## 2020-01-01 RX ADMIN — MELATONIN 2 TABLET: at 09:00

## 2020-01-01 RX ADMIN — Medication 100 MG: at 08:20

## 2020-01-01 RX ADMIN — SUCRALFATE 1 G: 1 TABLET ORAL at 22:54

## 2020-01-01 RX ADMIN — Medication 10 ML: at 13:45

## 2020-01-01 RX ADMIN — LEVOFLOXACIN 750 MG: 5 INJECTION, SOLUTION INTRAVENOUS at 15:01

## 2020-01-01 RX ADMIN — OCTREOTIDE ACETATE 25 MCG: 50 INJECTION, SOLUTION INTRAVENOUS; SUBCUTANEOUS at 22:14

## 2020-01-01 RX ADMIN — PIPERACILLIN AND TAZOBACTAM 3.38 G: 3; .375 INJECTION, POWDER, LYOPHILIZED, FOR SOLUTION INTRAVENOUS at 05:23

## 2020-01-01 RX ADMIN — VENLAFAXINE HYDROCHLORIDE 75 MG: 37.5 CAPSULE, EXTENDED RELEASE ORAL at 09:12

## 2020-01-01 RX ADMIN — AMOXICILLIN AND CLAVULANATE POTASSIUM 1 TABLET: 875; 125 TABLET, FILM COATED ORAL at 17:38

## 2020-01-01 RX ADMIN — OCTREOTIDE ACETATE 25 MCG: 50 INJECTION, SOLUTION INTRAVENOUS; SUBCUTANEOUS at 17:09

## 2020-01-01 RX ADMIN — Medication 40 ML: at 07:17

## 2020-01-01 RX ADMIN — OCTREOTIDE ACETATE 25 MCG: 50 INJECTION, SOLUTION INTRAVENOUS; SUBCUTANEOUS at 09:55

## 2020-01-01 RX ADMIN — ONDANSETRON 4 MG: 2 INJECTION INTRAMUSCULAR; INTRAVENOUS at 08:34

## 2020-01-01 RX ADMIN — Medication 10 ML: at 21:05

## 2020-01-01 RX ADMIN — SILDENAFIL CITRATE 20 MG: 20 TABLET ORAL at 16:27

## 2020-01-01 RX ADMIN — MAGNESIUM OXIDE TAB 400 MG (241.3 MG ELEMENTAL MG) 400 MG: 400 (241.3 MG) TAB at 09:28

## 2020-01-01 RX ADMIN — DOCUSATE SODIUM 100 MG: 100 CAPSULE, LIQUID FILLED ORAL at 11:42

## 2020-01-01 RX ADMIN — Medication 1 CAPSULE: at 08:39

## 2020-01-01 RX ADMIN — CALCIUM POLYCARBOPHIL 625 MG: 625 TABLET, FILM COATED ORAL at 09:47

## 2020-01-01 RX ADMIN — SUCRALFATE 1 G: 1 TABLET ORAL at 11:30

## 2020-01-01 RX ADMIN — WARFARIN SODIUM 5 MG: 5 TABLET ORAL at 17:19

## 2020-01-01 RX ADMIN — Medication 30 ML: at 17:25

## 2020-01-01 RX ADMIN — OXYMETAZOLINE HYDROCHLORIDE 2 SPRAY: 0.05 SPRAY NASAL at 18:09

## 2020-01-01 RX ADMIN — Medication 30 ML: at 14:00

## 2020-01-01 RX ADMIN — LEVETIRACETAM 250 MG: 250 TABLET ORAL at 08:36

## 2020-01-01 RX ADMIN — LEVOFLOXACIN 750 MG: 5 INJECTION, SOLUTION INTRAVENOUS at 14:18

## 2020-01-01 RX ADMIN — CLONAZEPAM 0.25 MG: 1 TABLET ORAL at 17:38

## 2020-01-01 RX ADMIN — Medication 10 ML: at 14:14

## 2020-01-01 RX ADMIN — Medication 10 ML: at 12:42

## 2020-01-01 RX ADMIN — BUDESONIDE 500 MCG: 0.5 INHALANT RESPIRATORY (INHALATION) at 19:48

## 2020-01-01 RX ADMIN — FINASTERIDE 5 MG: 5 TABLET, FILM COATED ORAL at 10:22

## 2020-01-01 RX ADMIN — SPIRONOLACTONE 25 MG: 25 TABLET ORAL at 08:59

## 2020-01-01 RX ADMIN — FINASTERIDE 5 MG: 5 TABLET, FILM COATED ORAL at 09:53

## 2020-01-01 RX ADMIN — PANTOPRAZOLE SODIUM 40 MG: 40 TABLET, DELAYED RELEASE ORAL at 06:56

## 2020-01-01 RX ADMIN — SUCRALFATE 1 G: 1 TABLET ORAL at 21:12

## 2020-01-01 RX ADMIN — OCTREOTIDE ACETATE 25 MCG: 50 INJECTION, SOLUTION INTRAVENOUS; SUBCUTANEOUS at 08:46

## 2020-01-01 RX ADMIN — HYDRALAZINE HYDROCHLORIDE 10 MG: 10 TABLET, FILM COATED ORAL at 21:12

## 2020-01-01 RX ADMIN — BUDESONIDE 500 MCG: 0.5 INHALANT RESPIRATORY (INHALATION) at 09:05

## 2020-01-01 RX ADMIN — VENLAFAXINE HYDROCHLORIDE 150 MG: 150 CAPSULE, EXTENDED RELEASE ORAL at 08:00

## 2020-01-01 RX ADMIN — SUCRALFATE 1 G: 1 TABLET ORAL at 16:52

## 2020-01-01 RX ADMIN — MELATONIN 2 TABLET: at 09:53

## 2020-01-01 RX ADMIN — TAMSULOSIN HYDROCHLORIDE 0.8 MG: 0.4 CAPSULE ORAL at 21:15

## 2020-01-01 RX ADMIN — POTASSIUM CHLORIDE 40 MEQ: 750 TABLET, FILM COATED, EXTENDED RELEASE ORAL at 09:32

## 2020-01-01 RX ADMIN — MELATONIN 2 TABLET: at 08:54

## 2020-01-01 RX ADMIN — GLYCOPYRROLATE 0.2 MG: 0.2 INJECTION, SOLUTION INTRAMUSCULAR; INTRAVENOUS at 19:06

## 2020-01-01 RX ADMIN — SUCRALFATE 1 G: 1 TABLET ORAL at 23:00

## 2020-01-01 RX ADMIN — LEVETIRACETAM 125 MG: 250 TABLET ORAL at 17:21

## 2020-01-01 RX ADMIN — FINASTERIDE 5 MG: 5 TABLET, FILM COATED ORAL at 22:35

## 2020-01-01 RX ADMIN — MIDODRINE HYDROCHLORIDE 5 MG: 5 TABLET ORAL at 16:04

## 2020-01-01 RX ADMIN — OCTREOTIDE ACETATE 25 MCG: 50 INJECTION, SOLUTION INTRAVENOUS; SUBCUTANEOUS at 09:58

## 2020-01-01 RX ADMIN — MAGNESIUM OXIDE TAB 400 MG (241.3 MG ELEMENTAL MG) 800 MG: 400 (241.3 MG) TAB at 09:23

## 2020-01-01 RX ADMIN — LEVETIRACETAM 250 MG: 250 TABLET ORAL at 08:39

## 2020-01-01 RX ADMIN — BUDESONIDE 500 MCG: 0.5 INHALANT RESPIRATORY (INHALATION) at 07:24

## 2020-01-01 RX ADMIN — CIPROFLOXACIN 750 MG: 500 TABLET, FILM COATED ORAL at 11:23

## 2020-01-01 RX ADMIN — VENLAFAXINE HYDROCHLORIDE 150 MG: 150 CAPSULE, EXTENDED RELEASE ORAL at 11:47

## 2020-01-01 RX ADMIN — SILDENAFIL CITRATE 20 MG: 20 TABLET ORAL at 15:28

## 2020-01-01 RX ADMIN — ALBUMIN (HUMAN) 12.5 G: 0.25 INJECTION, SOLUTION INTRAVENOUS at 10:02

## 2020-01-01 RX ADMIN — COSYNTROPIN 0.25 MG: 0.25 INJECTION, POWDER, LYOPHILIZED, FOR SOLUTION INTRAMUSCULAR; INTRAVENOUS at 10:56

## 2020-01-01 RX ADMIN — VENLAFAXINE HYDROCHLORIDE 150 MG: 150 CAPSULE, EXTENDED RELEASE ORAL at 08:36

## 2020-01-01 RX ADMIN — ALBUMIN (HUMAN) 12.5 G: 0.25 INJECTION, SOLUTION INTRAVENOUS at 09:05

## 2020-01-01 RX ADMIN — POTASSIUM CHLORIDE 20 MEQ: 750 TABLET, FILM COATED, EXTENDED RELEASE ORAL at 08:57

## 2020-01-01 RX ADMIN — IPRATROPIUM BROMIDE AND ALBUTEROL SULFATE 3 ML: .5; 3 SOLUTION RESPIRATORY (INHALATION) at 00:43

## 2020-01-01 RX ADMIN — MAGNESIUM OXIDE TAB 400 MG (241.3 MG ELEMENTAL MG) 800 MG: 400 (241.3 MG) TAB at 23:48

## 2020-01-01 RX ADMIN — IPRATROPIUM BROMIDE AND ALBUTEROL SULFATE 3 ML: .5; 3 SOLUTION RESPIRATORY (INHALATION) at 19:56

## 2020-01-01 RX ADMIN — BUDESONIDE 500 MCG: 0.5 INHALANT RESPIRATORY (INHALATION) at 20:18

## 2020-01-01 RX ADMIN — GENTAMICIN SULFATE: 1 CREAM TOPICAL at 13:55

## 2020-01-01 RX ADMIN — ACETYLCYSTEINE 1200 MG: 200 SOLUTION ORAL; RESPIRATORY (INHALATION) at 07:24

## 2020-01-01 RX ADMIN — BUDESONIDE 500 MCG: 0.5 INHALANT RESPIRATORY (INHALATION) at 19:38

## 2020-01-01 RX ADMIN — CIPROFLOXACIN 750 MG: 500 TABLET, FILM COATED ORAL at 22:52

## 2020-01-01 RX ADMIN — OCTREOTIDE ACETATE 25 MCG: 50 INJECTION, SOLUTION INTRAVENOUS; SUBCUTANEOUS at 08:12

## 2020-01-01 RX ADMIN — HYDRALAZINE HYDROCHLORIDE 10 MG: 20 INJECTION INTRAMUSCULAR; INTRAVENOUS at 23:36

## 2020-01-01 RX ADMIN — FINASTERIDE 5 MG: 5 TABLET, FILM COATED ORAL at 09:00

## 2020-01-01 RX ADMIN — SUCRALFATE 1 G: 1 TABLET ORAL at 06:51

## 2020-01-01 RX ADMIN — COLLAGENASE SANTYL: 250 OINTMENT TOPICAL at 09:00

## 2020-01-01 RX ADMIN — THERA TABS 1 TABLET: TAB at 09:55

## 2020-01-01 RX ADMIN — MEROPENEM 500 MG: 500 INJECTION, POWDER, FOR SOLUTION INTRAVENOUS at 15:19

## 2020-01-01 RX ADMIN — PANTOPRAZOLE SODIUM 40 MG: 40 TABLET, DELAYED RELEASE ORAL at 06:55

## 2020-01-01 RX ADMIN — TAMSULOSIN HYDROCHLORIDE 0.4 MG: 0.4 CAPSULE ORAL at 09:06

## 2020-01-01 RX ADMIN — LEVETIRACETAM 250 MG: 250 TABLET ORAL at 08:25

## 2020-01-01 RX ADMIN — ALBUMIN (HUMAN) 12.5 G: 0.25 INJECTION, SOLUTION INTRAVENOUS at 23:16

## 2020-01-01 RX ADMIN — CLONAZEPAM 0.25 MG: 0.5 TABLET ORAL at 13:52

## 2020-01-01 RX ADMIN — TAMSULOSIN HYDROCHLORIDE 0.4 MG: 0.4 CAPSULE ORAL at 08:32

## 2020-01-01 RX ADMIN — CEFEPIME HYDROCHLORIDE 2 G: 2 INJECTION, POWDER, FOR SOLUTION INTRAVENOUS at 23:08

## 2020-01-01 RX ADMIN — Medication 1 CAPSULE: at 08:27

## 2020-01-01 RX ADMIN — MAGNESIUM OXIDE TAB 400 MG (241.3 MG ELEMENTAL MG) 800 MG: 400 (241.3 MG) TAB at 17:20

## 2020-01-01 RX ADMIN — SILDENAFIL CITRATE 10 MG: 20 TABLET ORAL at 17:46

## 2020-01-01 RX ADMIN — IPRATROPIUM BROMIDE AND ALBUTEROL SULFATE 3 ML: .5; 3 SOLUTION RESPIRATORY (INHALATION) at 07:10

## 2020-01-01 RX ADMIN — Medication 1 CAPSULE: at 08:42

## 2020-01-01 RX ADMIN — Medication 10 ML: at 13:34

## 2020-01-01 RX ADMIN — Medication 10 ML: at 16:14

## 2020-01-01 RX ADMIN — FINASTERIDE 5 MG: 5 TABLET, FILM COATED ORAL at 09:24

## 2020-01-01 RX ADMIN — OCTREOTIDE ACETATE 25 MCG: 50 INJECTION, SOLUTION INTRAVENOUS; SUBCUTANEOUS at 01:00

## 2020-01-01 RX ADMIN — LEVETIRACETAM 250 MG: 250 TABLET ORAL at 18:01

## 2020-01-01 RX ADMIN — ALLOPURINOL 100 MG: 100 TABLET ORAL at 08:25

## 2020-01-01 RX ADMIN — MEROPENEM 500 MG: 500 INJECTION, POWDER, FOR SOLUTION INTRAVENOUS at 11:30

## 2020-01-01 RX ADMIN — Medication 1 CAPSULE: at 09:06

## 2020-01-01 RX ADMIN — TAMSULOSIN HYDROCHLORIDE 0.4 MG: 0.4 CAPSULE ORAL at 08:43

## 2020-01-01 RX ADMIN — Medication 10 ML: at 06:23

## 2020-01-01 RX ADMIN — OCTREOTIDE ACETATE 25 MCG: 50 INJECTION, SOLUTION INTRAVENOUS; SUBCUTANEOUS at 21:02

## 2020-01-01 RX ADMIN — DIGOXIN 0.06 MG: 125 TABLET ORAL at 21:00

## 2020-01-01 RX ADMIN — TAMSULOSIN HYDROCHLORIDE 0.4 MG: 0.4 CAPSULE ORAL at 08:12

## 2020-01-01 RX ADMIN — LEVETIRACETAM 250 MG: 250 TABLET ORAL at 08:46

## 2020-01-01 RX ADMIN — BUDESONIDE 500 MCG: 0.5 INHALANT RESPIRATORY (INHALATION) at 21:33

## 2020-01-01 RX ADMIN — DRONABINOL 2.5 MG: 2.5 CAPSULE ORAL at 17:03

## 2020-01-01 RX ADMIN — GLYCOPYRROLATE 0.2 MG: 0.2 INJECTION, SOLUTION INTRAMUSCULAR; INTRAVENOUS at 15:18

## 2020-01-01 RX ADMIN — Medication 10 ML: at 07:18

## 2020-01-01 RX ADMIN — Medication 10 ML: at 06:55

## 2020-01-01 RX ADMIN — OCTREOTIDE ACETATE 25 MCG: 50 INJECTION, SOLUTION INTRAVENOUS; SUBCUTANEOUS at 17:33

## 2020-01-01 RX ADMIN — CEFEPIME HYDROCHLORIDE 2 G: 2 INJECTION, POWDER, FOR SOLUTION INTRAVENOUS at 17:04

## 2020-01-01 RX ADMIN — Medication 100 MG: at 09:04

## 2020-01-01 RX ADMIN — MAGNESIUM OXIDE TAB 400 MG (241.3 MG ELEMENTAL MG) 800 MG: 400 (241.3 MG) TAB at 08:27

## 2020-01-01 RX ADMIN — CEFEPIME HYDROCHLORIDE 2 G: 2 INJECTION, POWDER, FOR SOLUTION INTRAVENOUS at 16:28

## 2020-01-01 RX ADMIN — LEVOFLOXACIN 750 MG: 5 INJECTION, SOLUTION INTRAVENOUS at 15:07

## 2020-01-01 RX ADMIN — Medication 10 ML: at 06:13

## 2020-01-01 RX ADMIN — CEFEPIME HYDROCHLORIDE 2 G: 2 INJECTION, POWDER, FOR SOLUTION INTRAVENOUS at 07:28

## 2020-01-01 RX ADMIN — MEROPENEM 500 MG: 500 INJECTION, POWDER, FOR SOLUTION INTRAVENOUS at 02:25

## 2020-01-01 RX ADMIN — THERA TABS 1 TABLET: TAB at 08:40

## 2020-01-01 RX ADMIN — PIPERACILLIN AND TAZOBACTAM 3.38 G: 3; .375 INJECTION, POWDER, LYOPHILIZED, FOR SOLUTION INTRAVENOUS at 19:23

## 2020-01-01 RX ADMIN — Medication 1 CAPSULE: at 09:01

## 2020-01-01 RX ADMIN — DIGOXIN 0.06 MG: 125 TABLET ORAL at 09:25

## 2020-01-01 RX ADMIN — FLUTICASONE PROPIONATE 2 SPRAY: 50 SPRAY, METERED NASAL at 08:26

## 2020-01-01 RX ADMIN — BUMETANIDE 1 MG: 0.25 INJECTION INTRAMUSCULAR; INTRAVENOUS at 12:06

## 2020-01-01 RX ADMIN — TAMSULOSIN HYDROCHLORIDE 0.4 MG: 0.4 CAPSULE ORAL at 09:44

## 2020-01-01 RX ADMIN — MEROPENEM 500 MG: 500 INJECTION, POWDER, FOR SOLUTION INTRAVENOUS at 11:31

## 2020-01-01 RX ADMIN — IPRATROPIUM BROMIDE AND ALBUTEROL SULFATE 3 ML: .5; 3 SOLUTION RESPIRATORY (INHALATION) at 22:05

## 2020-01-01 RX ADMIN — WARFARIN SODIUM 4 MG: 4 TABLET ORAL at 17:02

## 2020-01-01 RX ADMIN — MAGNESIUM OXIDE TAB 400 MG (241.3 MG ELEMENTAL MG) 800 MG: 400 (241.3 MG) TAB at 09:12

## 2020-01-01 RX ADMIN — POTASSIUM CHLORIDE 20 MEQ: 750 TABLET, FILM COATED, EXTENDED RELEASE ORAL at 17:30

## 2020-01-01 RX ADMIN — Medication: at 12:33

## 2020-01-01 RX ADMIN — SENNOSIDES AND DOCUSATE SODIUM 1 TABLET: 8.6; 5 TABLET ORAL at 09:39

## 2020-01-01 RX ADMIN — EPOETIN ALFA-EPBX 10000 UNITS: 10000 INJECTION, SOLUTION INTRAVENOUS; SUBCUTANEOUS at 22:33

## 2020-01-01 RX ADMIN — BUDESONIDE 500 MCG: 0.5 INHALANT RESPIRATORY (INHALATION) at 07:08

## 2020-01-01 RX ADMIN — Medication 10 ML: at 22:16

## 2020-01-01 RX ADMIN — OCTREOTIDE ACETATE 25 MCG: 50 INJECTION, SOLUTION INTRAVENOUS; SUBCUTANEOUS at 08:00

## 2020-01-01 RX ADMIN — THERA TABS 1 TABLET: TAB at 08:55

## 2020-01-01 RX ADMIN — Medication 1 CAPSULE: at 09:53

## 2020-01-01 RX ADMIN — SUCRALFATE 1 G: 1 TABLET ORAL at 21:34

## 2020-01-01 RX ADMIN — OCTREOTIDE ACETATE 25 MCG: 50 INJECTION, SOLUTION INTRAVENOUS; SUBCUTANEOUS at 10:20

## 2020-01-01 RX ADMIN — TAMSULOSIN HYDROCHLORIDE 0.4 MG: 0.4 CAPSULE ORAL at 08:40

## 2020-01-01 RX ADMIN — SPIRONOLACTONE 25 MG: 25 TABLET ORAL at 09:12

## 2020-01-01 RX ADMIN — FLUDROCORTISONE ACETATE 0.1 MG: 0.1 TABLET ORAL at 08:59

## 2020-01-01 RX ADMIN — SILDENAFIL CITRATE 20 MG: 20 TABLET ORAL at 09:51

## 2020-01-01 RX ADMIN — MELATONIN 2 TABLET: at 08:24

## 2020-01-01 RX ADMIN — SUCRALFATE 1 G: 1 TABLET ORAL at 22:13

## 2020-01-01 RX ADMIN — BUMETANIDE 0.5 MG: 1 TABLET ORAL at 17:48

## 2020-01-01 RX ADMIN — CLONAZEPAM 0.25 MG: 0.5 TABLET ORAL at 14:45

## 2020-01-01 RX ADMIN — THERA TABS 1 TABLET: TAB at 09:12

## 2020-01-01 RX ADMIN — SILDENAFIL CITRATE 10 MG: 20 TABLET ORAL at 08:59

## 2020-01-01 RX ADMIN — MELATONIN 2 TABLET: at 08:40

## 2020-01-01 RX ADMIN — EPOETIN ALFA-EPBX 20000 UNITS: 10000 INJECTION, SOLUTION INTRAVENOUS; SUBCUTANEOUS at 21:01

## 2020-01-01 RX ADMIN — IOPAMIDOL 100 ML: 612 INJECTION, SOLUTION INTRAVENOUS at 20:00

## 2020-01-01 RX ADMIN — FINASTERIDE 5 MG: 5 TABLET, FILM COATED ORAL at 08:59

## 2020-01-01 RX ADMIN — ARFORMOTEROL TARTRATE 15 MCG: 15 SOLUTION RESPIRATORY (INHALATION) at 00:43

## 2020-01-01 RX ADMIN — TAMSULOSIN HYDROCHLORIDE 0.8 MG: 0.4 CAPSULE ORAL at 21:32

## 2020-01-01 RX ADMIN — LEVETIRACETAM 250 MG: 250 TABLET ORAL at 09:53

## 2020-01-01 RX ADMIN — TAMSULOSIN HYDROCHLORIDE 0.8 MG: 0.4 CAPSULE ORAL at 22:52

## 2020-01-01 RX ADMIN — Medication 10 ML: at 23:30

## 2020-01-01 RX ADMIN — VENLAFAXINE HYDROCHLORIDE 150 MG: 150 CAPSULE, EXTENDED RELEASE ORAL at 09:34

## 2020-01-01 RX ADMIN — MIDODRINE HYDROCHLORIDE 5 MG: 5 TABLET ORAL at 13:17

## 2020-01-01 RX ADMIN — VENLAFAXINE HYDROCHLORIDE 150 MG: 150 CAPSULE, EXTENDED RELEASE ORAL at 08:27

## 2020-01-01 RX ADMIN — BUDESONIDE 500 MCG: 0.5 INHALANT RESPIRATORY (INHALATION) at 20:37

## 2020-01-01 RX ADMIN — CEFEPIME HYDROCHLORIDE 2 G: 2 INJECTION, POWDER, FOR SOLUTION INTRAVENOUS at 15:27

## 2020-01-01 RX ADMIN — Medication 30 ML: at 13:09

## 2020-01-01 RX ADMIN — MEROPENEM 500 MG: 500 INJECTION, POWDER, FOR SOLUTION INTRAVENOUS at 03:40

## 2020-01-01 RX ADMIN — BUDESONIDE 500 MCG: 0.5 INHALANT RESPIRATORY (INHALATION) at 10:47

## 2020-01-01 RX ADMIN — SUCRALFATE 1 G: 1 TABLET ORAL at 12:15

## 2020-01-01 RX ADMIN — Medication 10 ML: at 06:24

## 2020-01-01 RX ADMIN — MAGNESIUM OXIDE TAB 400 MG (241.3 MG ELEMENTAL MG) 800 MG: 400 (241.3 MG) TAB at 08:25

## 2020-01-01 RX ADMIN — SUCRALFATE 1 G: 1 TABLET ORAL at 12:23

## 2020-01-01 RX ADMIN — SACUBITRIL AND VALSARTAN 1 TABLET: 24; 26 TABLET, FILM COATED ORAL at 09:21

## 2020-01-01 RX ADMIN — CLONAZEPAM 0.5 MG: 0.5 TABLET ORAL at 13:48

## 2020-01-01 RX ADMIN — MIDODRINE HYDROCHLORIDE 5 MG: 5 TABLET ORAL at 17:00

## 2020-01-01 RX ADMIN — Medication 10 ML: at 05:16

## 2020-01-01 RX ADMIN — SUCRALFATE 1 G: 1 TABLET ORAL at 07:08

## 2020-01-01 RX ADMIN — FINASTERIDE 5 MG: 5 TABLET, FILM COATED ORAL at 09:44

## 2020-01-01 RX ADMIN — MAGNESIUM OXIDE TAB 400 MG (241.3 MG ELEMENTAL MG) 800 MG: 400 (241.3 MG) TAB at 17:30

## 2020-01-01 RX ADMIN — Medication 10 ML: at 23:33

## 2020-01-01 RX ADMIN — PIPERACILLIN AND TAZOBACTAM 3.38 G: 3; .375 INJECTION, POWDER, LYOPHILIZED, FOR SOLUTION INTRAVENOUS at 04:25

## 2020-01-01 RX ADMIN — ONDANSETRON 4 MG: 2 INJECTION INTRAMUSCULAR; INTRAVENOUS at 14:27

## 2020-01-01 RX ADMIN — COLLAGENASE SANTYL: 250 OINTMENT TOPICAL at 10:27

## 2020-01-01 RX ADMIN — OCTREOTIDE ACETATE 25 MCG: 50 INJECTION, SOLUTION INTRAVENOUS; SUBCUTANEOUS at 22:18

## 2020-01-01 RX ADMIN — Medication 10 ML: at 02:59

## 2020-01-01 RX ADMIN — FINASTERIDE 5 MG: 5 TABLET, FILM COATED ORAL at 09:12

## 2020-01-01 RX ADMIN — ONDANSETRON 4 MG: 2 INJECTION INTRAMUSCULAR; INTRAVENOUS at 01:51

## 2020-01-01 RX ADMIN — WARFARIN SODIUM 5 MG: 5 TABLET ORAL at 17:00

## 2020-01-01 RX ADMIN — CEFEPIME HYDROCHLORIDE 2 G: 2 INJECTION, POWDER, FOR SOLUTION INTRAVENOUS at 09:51

## 2020-01-01 RX ADMIN — POTASSIUM CHLORIDE 10 MEQ: 750 TABLET, FILM COATED, EXTENDED RELEASE ORAL at 09:04

## 2020-01-01 RX ADMIN — WARFARIN SODIUM 1 MG: 1 TABLET ORAL at 17:30

## 2020-01-01 RX ADMIN — SILDENAFIL CITRATE 20 MG: 20 TABLET ORAL at 21:52

## 2020-01-01 RX ADMIN — LEVETIRACETAM 250 MG: 250 TABLET ORAL at 17:52

## 2020-01-01 RX ADMIN — VENLAFAXINE HYDROCHLORIDE 150 MG: 150 CAPSULE, EXTENDED RELEASE ORAL at 09:04

## 2020-01-01 RX ADMIN — Medication 10 ML: at 14:27

## 2020-01-01 RX ADMIN — FINASTERIDE 5 MG: 5 TABLET, FILM COATED ORAL at 09:04

## 2020-01-01 RX ADMIN — THERA TABS 1 TABLET: TAB at 08:01

## 2020-01-01 RX ADMIN — WARFARIN SODIUM 4 MG: 1 TABLET ORAL at 17:20

## 2020-01-01 RX ADMIN — POTASSIUM CHLORIDE 40 MEQ: 750 TABLET, FILM COATED, EXTENDED RELEASE ORAL at 09:25

## 2020-01-01 RX ADMIN — BUDESONIDE 500 MCG: 0.5 INHALANT RESPIRATORY (INHALATION) at 07:44

## 2020-01-01 RX ADMIN — Medication 1 CAPSULE: at 09:00

## 2020-01-01 RX ADMIN — FINASTERIDE 5 MG: 5 TABLET, FILM COATED ORAL at 08:20

## 2020-01-01 RX ADMIN — PIPERACILLIN AND TAZOBACTAM 3.38 G: 3; .375 INJECTION, POWDER, LYOPHILIZED, FOR SOLUTION INTRAVENOUS at 11:42

## 2020-01-01 RX ADMIN — VENLAFAXINE HYDROCHLORIDE 150 MG: 150 CAPSULE, EXTENDED RELEASE ORAL at 08:39

## 2020-01-01 RX ADMIN — BUDESONIDE 500 MCG: 0.5 INHALANT RESPIRATORY (INHALATION) at 07:10

## 2020-01-01 RX ADMIN — MAGNESIUM OXIDE TAB 400 MG (241.3 MG ELEMENTAL MG) 800 MG: 400 (241.3 MG) TAB at 08:33

## 2020-01-01 RX ADMIN — POTASSIUM CHLORIDE 20 MEQ: 750 TABLET, FILM COATED, EXTENDED RELEASE ORAL at 09:34

## 2020-01-01 RX ADMIN — BUDESONIDE 500 MCG: 0.5 INHALANT RESPIRATORY (INHALATION) at 10:28

## 2020-01-01 RX ADMIN — IOPAMIDOL 100 ML: 755 INJECTION, SOLUTION INTRAVENOUS at 12:42

## 2020-01-01 RX ADMIN — Medication 10 ML: at 22:14

## 2020-01-01 RX ADMIN — ACETAMINOPHEN 650 MG: 325 TABLET, FILM COATED ORAL at 20:33

## 2020-01-01 RX ADMIN — MAGNESIUM OXIDE TAB 400 MG (241.3 MG ELEMENTAL MG) 800 MG: 400 (241.3 MG) TAB at 17:05

## 2020-01-01 RX ADMIN — SUCRALFATE 1 G: 1 TABLET ORAL at 16:07

## 2020-01-01 RX ADMIN — CEFEPIME HYDROCHLORIDE 2 G: 2 INJECTION, POWDER, FOR SOLUTION INTRAVENOUS at 17:09

## 2020-01-01 RX ADMIN — HYDRALAZINE HYDROCHLORIDE 20 MG: 20 INJECTION INTRAMUSCULAR; INTRAVENOUS at 04:10

## 2020-01-01 RX ADMIN — MAGNESIUM OXIDE TAB 400 MG (241.3 MG ELEMENTAL MG) 800 MG: 400 (241.3 MG) TAB at 17:42

## 2020-01-01 RX ADMIN — SUCRALFATE 1 G: 1 TABLET ORAL at 17:47

## 2020-01-01 RX ADMIN — LEVETIRACETAM 250 MG: 250 TABLET ORAL at 18:00

## 2020-01-01 RX ADMIN — IPRATROPIUM BROMIDE AND ALBUTEROL SULFATE 3 ML: .5; 3 SOLUTION RESPIRATORY (INHALATION) at 13:40

## 2020-01-01 RX ADMIN — Medication 1 CAPSULE: at 09:18

## 2020-01-01 RX ADMIN — CEFEPIME HYDROCHLORIDE 2 G: 2 INJECTION, POWDER, FOR SOLUTION INTRAVENOUS at 01:16

## 2020-01-01 RX ADMIN — ARFORMOTEROL TARTRATE 15 MCG: 15 SOLUTION RESPIRATORY (INHALATION) at 08:17

## 2020-01-01 RX ADMIN — SUCRALFATE 1 G: 1 TABLET ORAL at 11:49

## 2020-01-01 RX ADMIN — OCTREOTIDE ACETATE 25 MCG: 50 INJECTION, SOLUTION INTRAVENOUS; SUBCUTANEOUS at 16:00

## 2020-01-01 RX ADMIN — LEVETIRACETAM 250 MG: 250 TABLET ORAL at 09:18

## 2020-01-01 RX ADMIN — IPRATROPIUM BROMIDE AND ALBUTEROL SULFATE 3 ML: .5; 3 SOLUTION RESPIRATORY (INHALATION) at 07:17

## 2020-01-01 RX ADMIN — MEROPENEM 500 MG: 500 INJECTION, POWDER, FOR SOLUTION INTRAVENOUS at 09:26

## 2020-01-01 RX ADMIN — Medication 10 ML: at 15:41

## 2020-01-01 RX ADMIN — SUCRALFATE 1 G: 1 TABLET ORAL at 13:20

## 2020-01-01 RX ADMIN — Medication 10 ML: at 21:21

## 2020-01-01 RX ADMIN — FINASTERIDE 5 MG: 5 TABLET, FILM COATED ORAL at 08:40

## 2020-01-01 RX ADMIN — VENLAFAXINE HYDROCHLORIDE 150 MG: 150 CAPSULE, EXTENDED RELEASE ORAL at 09:47

## 2020-01-01 RX ADMIN — PANTOPRAZOLE SODIUM 40 MG: 40 TABLET, DELAYED RELEASE ORAL at 06:40

## 2020-01-01 RX ADMIN — BUDESONIDE 500 MCG: 0.5 INHALANT RESPIRATORY (INHALATION) at 22:59

## 2020-01-01 RX ADMIN — LEVETIRACETAM 250 MG: 250 TABLET ORAL at 09:42

## 2020-01-01 RX ADMIN — MIDODRINE HYDROCHLORIDE 5 MG: 5 TABLET ORAL at 09:00

## 2020-01-01 RX ADMIN — CLONAZEPAM 0.25 MG: 0.5 TABLET ORAL at 23:40

## 2020-01-01 RX ADMIN — SUCRALFATE 1 G: 1 TABLET ORAL at 17:12

## 2020-01-01 RX ADMIN — ARFORMOTEROL TARTRATE 15 MCG: 15 SOLUTION RESPIRATORY (INHALATION) at 18:24

## 2020-01-01 RX ADMIN — POTASSIUM CHLORIDE 10 MEQ: 750 TABLET, FILM COATED, EXTENDED RELEASE ORAL at 09:23

## 2020-01-01 RX ADMIN — Medication 1 G: at 08:25

## 2020-01-01 RX ADMIN — CLONAZEPAM 0.5 MG: 0.5 TABLET ORAL at 20:22

## 2020-01-01 RX ADMIN — MAGNESIUM OXIDE TAB 400 MG (241.3 MG ELEMENTAL MG) 800 MG: 400 (241.3 MG) TAB at 16:53

## 2020-01-01 RX ADMIN — MAGNESIUM OXIDE TAB 400 MG (241.3 MG ELEMENTAL MG) 800 MG: 400 (241.3 MG) TAB at 17:45

## 2020-01-01 RX ADMIN — POTASSIUM CHLORIDE 40 MEQ: 750 TABLET, FILM COATED, EXTENDED RELEASE ORAL at 09:12

## 2020-01-01 RX ADMIN — CEFEPIME HYDROCHLORIDE 2 G: 2 INJECTION, POWDER, FOR SOLUTION INTRAVENOUS at 23:57

## 2020-01-01 RX ADMIN — FINASTERIDE 5 MG: 5 TABLET, FILM COATED ORAL at 08:43

## 2020-01-01 RX ADMIN — SUCRALFATE 1 G: 1 TABLET ORAL at 07:18

## 2020-01-01 RX ADMIN — THERA TABS 1 TABLET: TAB at 09:43

## 2020-01-01 RX ADMIN — MEROPENEM 500 MG: 500 INJECTION, POWDER, FOR SOLUTION INTRAVENOUS at 06:00

## 2020-01-01 RX ADMIN — BUDESONIDE 500 MCG: 0.5 INHALANT RESPIRATORY (INHALATION) at 19:30

## 2020-01-01 RX ADMIN — Medication 10 ML: at 14:00

## 2020-01-01 RX ADMIN — MIDODRINE HYDROCHLORIDE 5 MG: 5 TABLET ORAL at 11:27

## 2020-01-01 RX ADMIN — ACETYLCYSTEINE 1200 MG: 200 SOLUTION ORAL; RESPIRATORY (INHALATION) at 10:12

## 2020-01-01 RX ADMIN — HYDRALAZINE HYDROCHLORIDE 20 MG: 20 INJECTION INTRAMUSCULAR; INTRAVENOUS at 08:56

## 2020-01-01 RX ADMIN — SUCRALFATE 1 G: 1 TABLET ORAL at 16:30

## 2020-01-01 RX ADMIN — PANTOPRAZOLE SODIUM 40 MG: 40 TABLET, DELAYED RELEASE ORAL at 06:30

## 2020-01-01 RX ADMIN — LEVETIRACETAM 250 MG: 250 TABLET ORAL at 20:01

## 2020-01-01 RX ADMIN — ALLOPURINOL 100 MG: 100 TABLET ORAL at 09:51

## 2020-01-01 RX ADMIN — SACUBITRIL AND VALSARTAN 1 TABLET: 24; 26 TABLET, FILM COATED ORAL at 08:27

## 2020-01-01 RX ADMIN — MELATONIN 2 TABLET: at 09:43

## 2020-01-01 RX ADMIN — PANTOPRAZOLE SODIUM 40 MG: 40 TABLET, DELAYED RELEASE ORAL at 06:43

## 2020-01-01 RX ADMIN — Medication 1 G: at 08:55

## 2020-01-01 RX ADMIN — LEVETIRACETAM 250 MG: 250 TABLET ORAL at 18:09

## 2020-01-01 RX ADMIN — ALBUMIN (HUMAN) 12.5 G: 12.5 INJECTION, SOLUTION INTRAVENOUS at 15:22

## 2020-01-01 RX ADMIN — SUCRALFATE 1 G: 1 TABLET ORAL at 07:30

## 2020-01-01 RX ADMIN — SUCRALFATE 1 G: 1 TABLET ORAL at 11:48

## 2020-01-01 RX ADMIN — TAMSULOSIN HYDROCHLORIDE 0.8 MG: 0.4 CAPSULE ORAL at 21:29

## 2020-01-01 RX ADMIN — MEROPENEM 500 MG: 500 INJECTION, POWDER, FOR SOLUTION INTRAVENOUS at 01:30

## 2020-01-01 RX ADMIN — PIPERACILLIN AND TAZOBACTAM 3.38 G: 3; .375 INJECTION, POWDER, LYOPHILIZED, FOR SOLUTION INTRAVENOUS at 20:58

## 2020-01-01 RX ADMIN — LORAZEPAM 1 MG: 2 INJECTION INTRAMUSCULAR; INTRAVENOUS at 12:43

## 2020-01-01 RX ADMIN — DOCUSATE SODIUM 100 MG: 100 CAPSULE, LIQUID FILLED ORAL at 17:08

## 2020-01-01 RX ADMIN — CIPROFLOXACIN 750 MG: 500 TABLET, FILM COATED ORAL at 12:55

## 2020-01-01 RX ADMIN — MELATONIN 2 TABLET: at 08:39

## 2020-01-01 RX ADMIN — SUCRALFATE 1 G: 1 TABLET ORAL at 20:55

## 2020-01-01 RX ADMIN — Medication 10 ML: at 14:45

## 2020-01-01 RX ADMIN — MIDODRINE HYDROCHLORIDE 5 MG: 5 TABLET ORAL at 08:54

## 2020-01-01 RX ADMIN — Medication 10 ML: at 15:39

## 2020-01-01 RX ADMIN — MAGNESIUM OXIDE TAB 400 MG (241.3 MG ELEMENTAL MG) 800 MG: 400 (241.3 MG) TAB at 08:39

## 2020-01-01 RX ADMIN — EPOETIN ALFA-EPBX 10000 UNITS: 10000 INJECTION, SOLUTION INTRAVENOUS; SUBCUTANEOUS at 20:36

## 2020-01-01 RX ADMIN — ALLOPURINOL 100 MG: 100 TABLET ORAL at 08:40

## 2020-01-01 RX ADMIN — THERA TABS 1 TABLET: TAB at 09:22

## 2020-01-01 RX ADMIN — POTASSIUM CHLORIDE 20 MEQ: 750 TABLET, FILM COATED, EXTENDED RELEASE ORAL at 08:20

## 2020-01-01 RX ADMIN — HYDRALAZINE HYDROCHLORIDE 10 MG: 20 INJECTION INTRAMUSCULAR; INTRAVENOUS at 23:16

## 2020-01-01 RX ADMIN — CLONAZEPAM 0.5 MG: 0.5 TABLET ORAL at 16:20

## 2020-01-01 RX ADMIN — Medication 1 CAPSULE: at 09:12

## 2020-01-01 RX ADMIN — PANTOPRAZOLE SODIUM 40 MG: 40 TABLET, DELAYED RELEASE ORAL at 07:13

## 2020-01-01 RX ADMIN — DIGOXIN 0.06 MG: 125 TABLET ORAL at 21:27

## 2020-01-01 RX ADMIN — SUCRALFATE 1 G: 1 TABLET ORAL at 06:56

## 2020-01-01 RX ADMIN — OCTREOTIDE ACETATE 25 MCG: 50 INJECTION, SOLUTION INTRAVENOUS; SUBCUTANEOUS at 21:53

## 2020-01-01 RX ADMIN — SILDENAFIL CITRATE 20 MG: 20 TABLET ORAL at 15:08

## 2020-01-01 RX ADMIN — Medication 10 ML: at 06:57

## 2020-01-01 RX ADMIN — PANTOPRAZOLE SODIUM 40 MG: 40 TABLET, DELAYED RELEASE ORAL at 08:45

## 2020-01-01 RX ADMIN — PANTOPRAZOLE SODIUM 40 MG: 40 TABLET, DELAYED RELEASE ORAL at 07:28

## 2020-01-01 RX ADMIN — CEFEPIME HYDROCHLORIDE 2 G: 2 INJECTION, POWDER, FOR SOLUTION INTRAVENOUS at 18:13

## 2020-01-01 RX ADMIN — OCTREOTIDE ACETATE 25 MCG: 50 INJECTION, SOLUTION INTRAVENOUS; SUBCUTANEOUS at 09:00

## 2020-01-01 RX ADMIN — OCTREOTIDE ACETATE 25 MCG: 50 INJECTION, SOLUTION INTRAVENOUS; SUBCUTANEOUS at 21:21

## 2020-01-01 RX ADMIN — MEROPENEM 500 MG: 500 INJECTION, POWDER, FOR SOLUTION INTRAVENOUS at 02:53

## 2020-01-01 RX ADMIN — POTASSIUM CHLORIDE 10 MEQ: 750 TABLET, FILM COATED, EXTENDED RELEASE ORAL at 08:25

## 2020-01-01 RX ADMIN — CLONAZEPAM 0.25 MG: 1 TABLET ORAL at 09:22

## 2020-01-01 RX ADMIN — CEFEPIME HYDROCHLORIDE 2 G: 2 INJECTION, POWDER, FOR SOLUTION INTRAVENOUS at 17:26

## 2020-01-01 RX ADMIN — MAGNESIUM OXIDE TAB 400 MG (241.3 MG ELEMENTAL MG) 800 MG: 400 (241.3 MG) TAB at 08:59

## 2020-01-01 RX ADMIN — MAGNESIUM OXIDE TAB 400 MG (241.3 MG ELEMENTAL MG) 800 MG: 400 (241.3 MG) TAB at 09:34

## 2020-01-01 RX ADMIN — MEROPENEM 500 MG: 500 INJECTION, POWDER, FOR SOLUTION INTRAVENOUS at 08:47

## 2020-01-01 RX ADMIN — MAGNESIUM OXIDE TAB 400 MG (241.3 MG ELEMENTAL MG) 400 MG: 400 (241.3 MG) TAB at 17:01

## 2020-01-01 RX ADMIN — WARFARIN SODIUM 4 MG: 1 TABLET ORAL at 16:04

## 2020-01-01 RX ADMIN — OCTREOTIDE ACETATE 25 MCG: 50 INJECTION, SOLUTION INTRAVENOUS; SUBCUTANEOUS at 23:00

## 2020-01-01 RX ADMIN — ACETAMINOPHEN 650 MG: 325 TABLET, FILM COATED ORAL at 13:35

## 2020-01-01 RX ADMIN — THERA TABS 1 TABLET: TAB at 08:32

## 2020-01-01 RX ADMIN — Medication 10 ML: at 04:25

## 2020-01-01 RX ADMIN — LEVOFLOXACIN 750 MG: 5 INJECTION, SOLUTION INTRAVENOUS at 14:56

## 2020-01-01 RX ADMIN — DOCUSATE SODIUM 100 MG: 100 CAPSULE, LIQUID FILLED ORAL at 17:39

## 2020-01-01 RX ADMIN — POTASSIUM CHLORIDE 20 MEQ: 750 TABLET, FILM COATED, EXTENDED RELEASE ORAL at 17:00

## 2020-01-01 RX ADMIN — DIGOXIN 0.06 MG: 125 TABLET ORAL at 17:01

## 2020-01-01 RX ADMIN — FINASTERIDE 5 MG: 5 TABLET, FILM COATED ORAL at 09:27

## 2020-01-01 RX ADMIN — SODIUM BICARBONATE 650 MG: 650 TABLET ORAL at 09:42

## 2020-01-01 RX ADMIN — MIDODRINE HYDROCHLORIDE 5 MG: 5 TABLET ORAL at 08:32

## 2020-01-01 RX ADMIN — MELATONIN 2 TABLET: at 09:04

## 2020-01-01 RX ADMIN — FLUDROCORTISONE ACETATE 0.1 MG: 0.1 TABLET ORAL at 17:01

## 2020-01-01 RX ADMIN — SILDENAFIL CITRATE 20 MG: 20 TABLET ORAL at 09:07

## 2020-01-01 RX ADMIN — HYDRALAZINE HYDROCHLORIDE 10 MG: 10 TABLET, FILM COATED ORAL at 15:35

## 2020-01-01 RX ADMIN — SUCRALFATE 1 G: 1 TABLET ORAL at 12:24

## 2020-01-01 RX ADMIN — Medication 10 ML: at 23:50

## 2020-01-01 RX ADMIN — PIPERACILLIN AND TAZOBACTAM 3.38 G: 3; .375 INJECTION, POWDER, LYOPHILIZED, FOR SOLUTION INTRAVENOUS at 04:22

## 2020-01-01 RX ADMIN — ARFORMOTEROL TARTRATE 15 MCG: 15 SOLUTION RESPIRATORY (INHALATION) at 09:36

## 2020-01-01 RX ADMIN — ARFORMOTEROL TARTRATE 15 MCG: 15 SOLUTION RESPIRATORY (INHALATION) at 20:18

## 2020-01-01 RX ADMIN — MEROPENEM 500 MG: 500 INJECTION, POWDER, FOR SOLUTION INTRAVENOUS at 22:13

## 2020-01-01 RX ADMIN — WARFARIN SODIUM 1 MG: 1 TABLET ORAL at 17:56

## 2020-01-01 RX ADMIN — SODIUM CHLORIDE 75 ML/HR: 900 INJECTION, SOLUTION INTRAVENOUS at 13:38

## 2020-01-01 RX ADMIN — SACUBITRIL AND VALSARTAN 1 TABLET: 24; 26 TABLET, FILM COATED ORAL at 17:39

## 2020-01-01 RX ADMIN — MELATONIN 2 TABLET: at 13:47

## 2020-01-01 RX ADMIN — TAMSULOSIN HYDROCHLORIDE 0.8 MG: 0.4 CAPSULE ORAL at 22:35

## 2020-01-01 RX ADMIN — ACETYLCYSTEINE 600 MG: 200 SOLUTION ORAL; RESPIRATORY (INHALATION) at 20:25

## 2020-01-01 RX ADMIN — POTASSIUM CHLORIDE 20 MEQ: 750 TABLET, FILM COATED, EXTENDED RELEASE ORAL at 09:55

## 2020-01-01 RX ADMIN — ACETAMINOPHEN 650 MG: 325 TABLET ORAL at 09:47

## 2020-01-01 RX ADMIN — LORAZEPAM 1 MG: 2 INJECTION INTRAMUSCULAR; INTRAVENOUS at 15:18

## 2020-01-01 RX ADMIN — VENLAFAXINE HYDROCHLORIDE 150 MG: 150 CAPSULE, EXTENDED RELEASE ORAL at 09:01

## 2020-01-01 RX ADMIN — LEVETIRACETAM 250 MG: 250 TABLET ORAL at 09:28

## 2020-01-01 RX ADMIN — ARFORMOTEROL TARTRATE 15 MCG: 15 SOLUTION RESPIRATORY (INHALATION) at 21:30

## 2020-01-01 RX ADMIN — IOPAMIDOL 100 ML: 612 INJECTION, SOLUTION INTRAVENOUS at 11:07

## 2020-01-01 RX ADMIN — HYDRALAZINE HYDROCHLORIDE 20 MG: 20 INJECTION INTRAMUSCULAR; INTRAVENOUS at 04:35

## 2020-01-01 RX ADMIN — IPRATROPIUM BROMIDE AND ALBUTEROL SULFATE 3 ML: .5; 3 SOLUTION RESPIRATORY (INHALATION) at 16:01

## 2020-01-01 RX ADMIN — MEROPENEM 500 MG: 500 INJECTION, POWDER, FOR SOLUTION INTRAVENOUS at 20:08

## 2020-01-01 RX ADMIN — CALCIUM POLYCARBOPHIL 625 MG: 625 TABLET, FILM COATED ORAL at 08:59

## 2020-01-01 RX ADMIN — Medication 10 ML: at 11:07

## 2020-01-01 RX ADMIN — Medication 10 ML: at 14:18

## 2020-01-01 RX ADMIN — CLONAZEPAM 0.5 MG: 0.5 TABLET ORAL at 08:29

## 2020-01-01 RX ADMIN — CLONAZEPAM 0.25 MG: 0.5 TABLET ORAL at 22:10

## 2020-01-01 RX ADMIN — CEFEPIME HYDROCHLORIDE 2 G: 2 INJECTION, POWDER, FOR SOLUTION INTRAVENOUS at 08:59

## 2020-01-01 RX ADMIN — Medication 10 ML: at 22:02

## 2020-01-01 RX ADMIN — HYDRALAZINE HYDROCHLORIDE 10 MG: 10 TABLET, FILM COATED ORAL at 08:47

## 2020-01-01 RX ADMIN — MEROPENEM 500 MG: 500 INJECTION, POWDER, FOR SOLUTION INTRAVENOUS at 11:55

## 2020-01-01 RX ADMIN — DIGOXIN 0.12 MG: 125 TABLET ORAL at 08:27

## 2020-01-01 RX ADMIN — WARFARIN SODIUM 1 MG: 1 TABLET ORAL at 17:21

## 2020-01-01 RX ADMIN — LEVOFLOXACIN 750 MG: 5 INJECTION, SOLUTION INTRAVENOUS at 14:30

## 2020-01-01 RX ADMIN — HYDROMORPHONE HYDROCHLORIDE 1 MG: 1 INJECTION, SOLUTION INTRAMUSCULAR; INTRAVENOUS; SUBCUTANEOUS at 23:48

## 2020-01-01 RX ADMIN — LEVETIRACETAM 250 MG: 250 TABLET ORAL at 09:47

## 2020-01-01 RX ADMIN — WARFARIN SODIUM 4 MG: 4 TABLET ORAL at 18:57

## 2020-01-01 RX ADMIN — HYDRALAZINE HYDROCHLORIDE 10 MG: 10 TABLET, FILM COATED ORAL at 21:33

## 2020-01-01 RX ADMIN — DIPHENHYDRAMINE HYDROCHLORIDE 25 MG: 25 CAPSULE ORAL at 00:17

## 2020-01-01 RX ADMIN — WARFARIN SODIUM 3 MG: 1 TABLET ORAL at 18:31

## 2020-01-01 RX ADMIN — FINASTERIDE 5 MG: 5 TABLET, FILM COATED ORAL at 08:54

## 2020-01-01 RX ADMIN — CEFEPIME HYDROCHLORIDE 2 G: 2 INJECTION, POWDER, FOR SOLUTION INTRAVENOUS at 08:11

## 2020-01-01 RX ADMIN — SUCRALFATE 1 G: 1 TABLET ORAL at 21:40

## 2020-01-01 RX ADMIN — SUCRALFATE 1 G: 1 TABLET ORAL at 08:45

## 2020-01-01 RX ADMIN — TAMSULOSIN HYDROCHLORIDE 0.4 MG: 0.4 CAPSULE ORAL at 08:00

## 2020-01-01 RX ADMIN — HYDROMORPHONE HYDROCHLORIDE 1 MG: 1 INJECTION, SOLUTION INTRAMUSCULAR; INTRAVENOUS; SUBCUTANEOUS at 19:06

## 2020-01-01 RX ADMIN — LEVETIRACETAM 250 MG: 250 TABLET ORAL at 21:32

## 2020-01-01 RX ADMIN — WATER 1 MG: 1 INJECTION INTRAMUSCULAR; INTRAVENOUS; SUBCUTANEOUS at 09:19

## 2020-01-01 RX ADMIN — FLUDROCORTISONE ACETATE 0.1 MG: 0.1 TABLET ORAL at 08:37

## 2020-01-01 RX ADMIN — Medication 10 ML: at 23:56

## 2020-01-01 RX ADMIN — MAGNESIUM OXIDE TAB 400 MG (241.3 MG ELEMENTAL MG) 800 MG: 400 (241.3 MG) TAB at 08:57

## 2020-01-01 RX ADMIN — CLONAZEPAM 0.5 MG: 0.5 TABLET ORAL at 21:48

## 2020-01-01 RX ADMIN — SILDENAFIL CITRATE 10 MG: 20 TABLET ORAL at 08:43

## 2020-01-01 RX ADMIN — INFLUENZA VIRUS VACCINE 0.5 ML: 15; 15; 15; 15 SUSPENSION INTRAMUSCULAR at 13:39

## 2020-01-01 RX ADMIN — MEROPENEM 500 MG: 500 INJECTION, POWDER, FOR SOLUTION INTRAVENOUS at 06:23

## 2020-01-01 RX ADMIN — OCTREOTIDE ACETATE 25 MCG: 50 INJECTION, SOLUTION INTRAVENOUS; SUBCUTANEOUS at 16:21

## 2020-01-01 RX ADMIN — SUCRALFATE 1 G: 1 TABLET ORAL at 11:04

## 2020-01-01 RX ADMIN — BUMETANIDE 2 MG: 0.25 INJECTION INTRAMUSCULAR; INTRAVENOUS at 10:26

## 2020-01-01 RX ADMIN — MIDODRINE HYDROCHLORIDE 5 MG: 5 TABLET ORAL at 17:20

## 2020-01-01 RX ADMIN — HYDROMORPHONE HYDROCHLORIDE 1 MG: 1 INJECTION, SOLUTION INTRAMUSCULAR; INTRAVENOUS; SUBCUTANEOUS at 04:32

## 2020-01-01 RX ADMIN — LEVOFLOXACIN 750 MG: 5 INJECTION, SOLUTION INTRAVENOUS at 15:43

## 2020-01-01 RX ADMIN — MIDODRINE HYDROCHLORIDE 5 MG: 5 TABLET ORAL at 17:19

## 2020-01-01 RX ADMIN — ARFORMOTEROL TARTRATE 15 MCG: 15 SOLUTION RESPIRATORY (INHALATION) at 07:05

## 2020-01-01 RX ADMIN — THERA TABS 1 TABLET: TAB at 11:47

## 2020-01-01 RX ADMIN — SUCRALFATE 1 G: 1 TABLET ORAL at 17:48

## 2020-01-01 RX ADMIN — GLYCOPYRROLATE 0.2 MG: 0.2 INJECTION, SOLUTION INTRAMUSCULAR; INTRAVENOUS at 12:41

## 2020-01-01 RX ADMIN — MAGNESIUM OXIDE TAB 400 MG (241.3 MG ELEMENTAL MG) 800 MG: 400 (241.3 MG) TAB at 17:00

## 2020-01-01 RX ADMIN — CLONAZEPAM 0.5 MG: 0.5 TABLET ORAL at 22:02

## 2020-01-01 RX ADMIN — BUDESONIDE 500 MCG: 0.5 INHALANT RESPIRATORY (INHALATION) at 21:54

## 2020-01-01 RX ADMIN — PIPERACILLIN AND TAZOBACTAM 3.38 G: 3; .375 INJECTION, POWDER, LYOPHILIZED, FOR SOLUTION INTRAVENOUS at 12:32

## 2020-01-01 RX ADMIN — CEFEPIME HYDROCHLORIDE 2 G: 2 INJECTION, POWDER, FOR SOLUTION INTRAVENOUS at 09:33

## 2020-01-01 RX ADMIN — FLUDROCORTISONE ACETATE 0.1 MG: 0.1 TABLET ORAL at 08:32

## 2020-01-01 RX ADMIN — MIDODRINE HYDROCHLORIDE 5 MG: 5 TABLET ORAL at 08:43

## 2020-01-01 RX ADMIN — BUDESONIDE 500 MCG: 0.5 INHALANT RESPIRATORY (INHALATION) at 21:43

## 2020-01-01 RX ADMIN — DOCUSATE SODIUM 100 MG: 100 CAPSULE, LIQUID FILLED ORAL at 08:40

## 2020-01-01 RX ADMIN — POTASSIUM CHLORIDE 10 MEQ: 750 TABLET, FILM COATED, EXTENDED RELEASE ORAL at 08:47

## 2020-01-01 RX ADMIN — Medication 10 ML: at 07:08

## 2020-01-01 RX ADMIN — CEFEPIME HYDROCHLORIDE 2 G: 2 INJECTION, POWDER, FOR SOLUTION INTRAVENOUS at 00:16

## 2020-01-01 RX ADMIN — HYDROMORPHONE HYDROCHLORIDE 0.5 MG: 1 INJECTION, SOLUTION INTRAMUSCULAR; INTRAVENOUS; SUBCUTANEOUS at 11:02

## 2020-01-01 RX ADMIN — CIPROFLOXACIN 750 MG: 500 TABLET, FILM COATED ORAL at 22:35

## 2020-01-01 RX ADMIN — SILDENAFIL CITRATE 20 MG: 20 TABLET ORAL at 08:43

## 2020-01-01 RX ADMIN — LEVETIRACETAM 125 MG: 250 TABLET ORAL at 17:47

## 2020-01-01 RX ADMIN — LEVETIRACETAM 250 MG: 250 TABLET ORAL at 09:36

## 2020-01-01 RX ADMIN — BUDESONIDE 500 MCG: 0.5 INHALANT RESPIRATORY (INHALATION) at 21:18

## 2020-01-01 RX ADMIN — ALBUMIN (HUMAN) 25 G: 0.25 INJECTION, SOLUTION INTRAVENOUS at 06:13

## 2020-01-01 RX ADMIN — MEROPENEM 500 MG: 500 INJECTION, POWDER, FOR SOLUTION INTRAVENOUS at 17:25

## 2020-01-01 RX ADMIN — SUCRALFATE 1 G: 1 TABLET ORAL at 21:28

## 2020-01-01 RX ADMIN — TAMSULOSIN HYDROCHLORIDE 0.4 MG: 0.4 CAPSULE ORAL at 08:39

## 2020-01-01 RX ADMIN — OLANZAPINE 2.5 MG: 2.5 TABLET, FILM COATED ORAL at 17:52

## 2020-01-01 RX ADMIN — ALBUMIN (HUMAN) 12.5 G: 0.25 INJECTION, SOLUTION INTRAVENOUS at 17:56

## 2020-01-01 RX ADMIN — MEROPENEM 500 MG: 500 INJECTION, POWDER, FOR SOLUTION INTRAVENOUS at 17:00

## 2020-01-01 RX ADMIN — ARFORMOTEROL TARTRATE 15 MCG: 15 SOLUTION RESPIRATORY (INHALATION) at 19:42

## 2020-01-01 RX ADMIN — SUCRALFATE 1 G: 1 TABLET ORAL at 09:51

## 2020-01-01 RX ADMIN — Medication 20 ML: at 11:30

## 2020-01-01 RX ADMIN — LEVETIRACETAM 250 MG: 250 TABLET ORAL at 17:48

## 2020-01-01 RX ADMIN — Medication 10 ML: at 22:15

## 2020-01-01 RX ADMIN — POTASSIUM CHLORIDE 20 MEQ: 750 TABLET, FILM COATED, EXTENDED RELEASE ORAL at 18:14

## 2020-01-01 RX ADMIN — LEVETIRACETAM 250 MG: 250 TABLET ORAL at 17:10

## 2020-01-01 RX ADMIN — WARFARIN SODIUM 1 MG: 1 TABLET ORAL at 17:33

## 2020-01-01 RX ADMIN — SILDENAFIL CITRATE 20 MG: 20 TABLET ORAL at 08:40

## 2020-01-01 RX ADMIN — BUDESONIDE 500 MCG: 0.5 INHALANT RESPIRATORY (INHALATION) at 10:12

## 2020-01-01 RX ADMIN — Medication 1 CAPSULE: at 09:33

## 2020-01-01 RX ADMIN — LEVETIRACETAM 250 MG: 250 TABLET ORAL at 17:56

## 2020-01-01 RX ADMIN — ALBUMIN (HUMAN) 12.5 G: 0.25 INJECTION, SOLUTION INTRAVENOUS at 08:26

## 2020-01-01 RX ADMIN — Medication 10 ML: at 22:01

## 2020-01-01 RX ADMIN — DOCUSATE SODIUM 100 MG: 100 CAPSULE, LIQUID FILLED ORAL at 17:42

## 2020-01-01 RX ADMIN — THERA TABS 1 TABLET: TAB at 08:24

## 2020-01-01 RX ADMIN — MIDODRINE HYDROCHLORIDE 5 MG: 5 TABLET ORAL at 12:24

## 2020-01-01 RX ADMIN — ARFORMOTEROL TARTRATE 15 MCG: 15 SOLUTION RESPIRATORY (INHALATION) at 08:02

## 2020-01-01 RX ADMIN — MAGNESIUM OXIDE TAB 400 MG (241.3 MG ELEMENTAL MG) 400 MG: 400 (241.3 MG) TAB at 17:18

## 2020-01-01 RX ADMIN — ALLOPURINOL 100 MG: 100 TABLET ORAL at 08:55

## 2020-01-01 RX ADMIN — MAGNESIUM OXIDE TAB 400 MG (241.3 MG ELEMENTAL MG) 800 MG: 400 (241.3 MG) TAB at 08:42

## 2020-01-01 RX ADMIN — ASPIRIN 81 MG: 81 TABLET, COATED ORAL at 08:41

## 2020-01-01 RX ADMIN — CEFEPIME HYDROCHLORIDE 2 G: 2 INJECTION, POWDER, FOR SOLUTION INTRAVENOUS at 17:30

## 2020-01-01 RX ADMIN — FLUDROCORTISONE ACETATE 0.1 MG: 0.1 TABLET ORAL at 08:57

## 2020-01-01 RX ADMIN — CEFEPIME HYDROCHLORIDE 2 G: 2 INJECTION, POWDER, FOR SOLUTION INTRAVENOUS at 08:54

## 2020-01-01 RX ADMIN — COLLAGENASE SANTYL: 250 OINTMENT TOPICAL at 11:12

## 2020-01-01 RX ADMIN — Medication 10 ML: at 07:14

## 2020-01-01 RX ADMIN — Medication 10 ML: at 21:29

## 2020-01-01 RX ADMIN — PIPERACILLIN AND TAZOBACTAM 3.38 G: 3; .375 INJECTION, POWDER, LYOPHILIZED, FOR SOLUTION INTRAVENOUS at 12:43

## 2020-01-01 RX ADMIN — MEROPENEM 500 MG: 500 INJECTION, POWDER, FOR SOLUTION INTRAVENOUS at 06:29

## 2020-01-01 RX ADMIN — OCTREOTIDE ACETATE 25 MCG: 50 INJECTION, SOLUTION INTRAVENOUS; SUBCUTANEOUS at 08:48

## 2020-01-01 RX ADMIN — MIDODRINE HYDROCHLORIDE 2.5 MG: 5 TABLET ORAL at 09:12

## 2020-01-01 RX ADMIN — TAMSULOSIN HYDROCHLORIDE 0.4 MG: 0.4 CAPSULE ORAL at 08:46

## 2020-01-01 RX ADMIN — ONDANSETRON 4 MG: 2 INJECTION INTRAMUSCULAR; INTRAVENOUS at 13:18

## 2020-01-01 RX ADMIN — EPOETIN ALFA-EPBX 10000 UNITS: 10000 INJECTION, SOLUTION INTRAVENOUS; SUBCUTANEOUS at 21:17

## 2020-01-01 RX ADMIN — HYDROCORTISONE: 1 CREAM TOPICAL at 23:08

## 2020-01-01 RX ADMIN — FENTANYL CITRATE 25 MCG: 50 INJECTION, SOLUTION INTRAMUSCULAR; INTRAVENOUS at 14:41

## 2020-01-01 RX ADMIN — MAGNESIUM SULFATE HEPTAHYDRATE 1 G: 1 INJECTION, SOLUTION INTRAVENOUS at 13:49

## 2020-01-01 RX ADMIN — VENLAFAXINE HYDROCHLORIDE 150 MG: 150 CAPSULE, EXTENDED RELEASE ORAL at 09:28

## 2020-01-01 RX ADMIN — ACETYLCYSTEINE 600 MG: 200 SOLUTION ORAL; RESPIRATORY (INHALATION) at 00:43

## 2020-01-01 RX ADMIN — BUMETANIDE 1 MG: 0.25 INJECTION INTRAMUSCULAR; INTRAVENOUS at 22:38

## 2020-01-01 RX ADMIN — LEVETIRACETAM 125 MG: 250 TABLET ORAL at 09:11

## 2020-01-01 RX ADMIN — TAMSULOSIN HYDROCHLORIDE 0.8 MG: 0.4 CAPSULE ORAL at 22:02

## 2020-01-01 RX ADMIN — OCTREOTIDE ACETATE 25 MCG: 50 INJECTION, SOLUTION INTRAVENOUS; SUBCUTANEOUS at 08:59

## 2020-01-01 RX ADMIN — Medication 10 ML: at 23:10

## 2020-01-01 RX ADMIN — FINASTERIDE 5 MG: 5 TABLET, FILM COATED ORAL at 08:33

## 2020-01-01 RX ADMIN — CEFEPIME HYDROCHLORIDE 2 G: 2 INJECTION, POWDER, FOR SOLUTION INTRAVENOUS at 09:45

## 2020-01-01 RX ADMIN — VENLAFAXINE HYDROCHLORIDE 150 MG: 150 CAPSULE, EXTENDED RELEASE ORAL at 07:58

## 2020-01-01 RX ADMIN — SUCRALFATE 1 G: 1 TABLET ORAL at 13:17

## 2020-01-01 RX ADMIN — SUCRALFATE 1 G: 1 TABLET ORAL at 07:21

## 2020-01-01 RX ADMIN — Medication 30 ML: at 13:20

## 2020-01-01 RX ADMIN — Medication 1 CAPSULE: at 09:34

## 2020-01-01 RX ADMIN — INSULIN LISPRO 2 UNITS: 100 INJECTION, SOLUTION INTRAVENOUS; SUBCUTANEOUS at 07:14

## 2020-01-01 RX ADMIN — ALBUMIN (HUMAN) 12.5 G: 12.5 INJECTION, SOLUTION INTRAVENOUS at 09:26

## 2020-01-01 RX ADMIN — DIGOXIN 0.12 MG: 125 TABLET ORAL at 10:22

## 2020-01-01 RX ADMIN — Medication 1 CAPSULE: at 08:12

## 2020-01-01 RX ADMIN — PIPERACILLIN AND TAZOBACTAM 3.38 G: 3; .375 INJECTION, POWDER, LYOPHILIZED, FOR SOLUTION INTRAVENOUS at 11:48

## 2020-01-01 RX ADMIN — FINASTERIDE 5 MG: 5 TABLET, FILM COATED ORAL at 09:09

## 2020-01-01 RX ADMIN — LEVOFLOXACIN 750 MG: 5 INJECTION, SOLUTION INTRAVENOUS at 16:42

## 2020-01-01 RX ADMIN — SUCRALFATE 1 G: 1 TABLET ORAL at 07:00

## 2020-01-01 RX ADMIN — MELATONIN 2 TABLET: at 11:47

## 2020-01-01 RX ADMIN — PANTOPRAZOLE SODIUM 40 MG: 40 TABLET, DELAYED RELEASE ORAL at 07:06

## 2020-01-01 RX ADMIN — DEXAMETHASONE SODIUM PHOSPHATE 4 MG: 4 INJECTION, SOLUTION INTRAMUSCULAR; INTRAVENOUS at 14:10

## 2020-01-01 RX ADMIN — PANTOPRAZOLE SODIUM 40 MG: 40 TABLET, DELAYED RELEASE ORAL at 06:51

## 2020-01-01 RX ADMIN — Medication 10 ML: at 15:07

## 2020-01-01 RX ADMIN — SUCRALFATE 1 G: 1 TABLET ORAL at 12:55

## 2020-01-01 RX ADMIN — Medication 10 ML: at 10:35

## 2020-01-01 RX ADMIN — Medication 10 ML: at 16:10

## 2020-01-01 RX ADMIN — Medication 10 ML: at 06:08

## 2020-01-01 RX ADMIN — VENLAFAXINE HYDROCHLORIDE 75 MG: 37.5 CAPSULE, EXTENDED RELEASE ORAL at 09:06

## 2020-01-01 RX ADMIN — OLANZAPINE 5 MG: 5 TABLET, FILM COATED ORAL at 17:09

## 2020-01-01 RX ADMIN — Medication 10 ML: at 11:08

## 2020-01-01 RX ADMIN — ACETYLCYSTEINE 600 MG: 200 SOLUTION ORAL; RESPIRATORY (INHALATION) at 08:42

## 2020-01-01 RX ADMIN — WARFARIN SODIUM 2 MG: 1 TABLET ORAL at 17:03

## 2020-01-01 RX ADMIN — MAGNESIUM OXIDE TAB 400 MG (241.3 MG ELEMENTAL MG) 800 MG: 400 (241.3 MG) TAB at 08:36

## 2020-01-01 RX ADMIN — MAGNESIUM OXIDE TAB 400 MG (241.3 MG ELEMENTAL MG) 400 MG: 400 (241.3 MG) TAB at 09:22

## 2020-01-01 RX ADMIN — ALBUMIN (HUMAN) 12.5 G: 0.25 INJECTION, SOLUTION INTRAVENOUS at 08:01

## 2020-01-01 RX ADMIN — BUDESONIDE 500 MCG: 0.5 INHALANT RESPIRATORY (INHALATION) at 21:00

## 2020-01-01 RX ADMIN — FLUTICASONE PROPIONATE 2 SPRAY: 50 SPRAY, METERED NASAL at 11:42

## 2020-01-01 RX ADMIN — TAMSULOSIN HYDROCHLORIDE 0.8 MG: 0.4 CAPSULE ORAL at 21:41

## 2020-01-01 RX ADMIN — TAMSULOSIN HYDROCHLORIDE 0.8 MG: 0.4 CAPSULE ORAL at 21:33

## 2020-01-01 RX ADMIN — MEROPENEM 500 MG: 500 INJECTION, POWDER, FOR SOLUTION INTRAVENOUS at 20:12

## 2020-01-01 RX ADMIN — BUDESONIDE 500 MCG: 0.5 INHALANT RESPIRATORY (INHALATION) at 22:03

## 2020-01-01 RX ADMIN — DIGOXIN 0.12 MG: 125 TABLET ORAL at 07:58

## 2020-01-01 RX ADMIN — EPOETIN ALFA-EPBX 20000 UNITS: 10000 INJECTION, SOLUTION INTRAVENOUS; SUBCUTANEOUS at 20:56

## 2020-01-01 RX ADMIN — Medication 100 MG: at 08:57

## 2020-01-01 RX ADMIN — WARFARIN SODIUM 4 MG: 1 TABLET ORAL at 17:01

## 2020-01-01 RX ADMIN — MEROPENEM 500 MG: 500 INJECTION, POWDER, FOR SOLUTION INTRAVENOUS at 20:01

## 2020-01-01 RX ADMIN — BUDESONIDE 500 MCG: 0.5 INHALANT RESPIRATORY (INHALATION) at 09:36

## 2020-01-01 RX ADMIN — COLLAGENASE SANTYL: 250 OINTMENT TOPICAL at 12:19

## 2020-01-01 RX ADMIN — ALLOPURINOL 100 MG: 100 TABLET ORAL at 08:42

## 2020-01-01 RX ADMIN — VENLAFAXINE HYDROCHLORIDE 150 MG: 150 CAPSULE, EXTENDED RELEASE ORAL at 08:58

## 2020-01-01 RX ADMIN — SUCRALFATE 1 G: 1 TABLET ORAL at 11:50

## 2020-01-01 RX ADMIN — SODIUM CHLORIDE, SODIUM LACTATE, POTASSIUM CHLORIDE, AND CALCIUM CHLORIDE: 600; 310; 30; 20 INJECTION, SOLUTION INTRAVENOUS at 14:00

## 2020-01-01 RX ADMIN — HYDRALAZINE HYDROCHLORIDE 10 MG: 10 TABLET, FILM COATED ORAL at 15:09

## 2020-01-01 RX ADMIN — MAGNESIUM OXIDE TAB 400 MG (241.3 MG ELEMENTAL MG) 800 MG: 400 (241.3 MG) TAB at 16:04

## 2020-01-01 RX ADMIN — WARFARIN SODIUM 5 MG: 5 TABLET ORAL at 17:38

## 2020-01-01 RX ADMIN — HYDROMORPHONE HYDROCHLORIDE 1 MG: 1 INJECTION, SOLUTION INTRAMUSCULAR; INTRAVENOUS; SUBCUTANEOUS at 07:27

## 2020-01-01 RX ADMIN — IPRATROPIUM BROMIDE AND ALBUTEROL SULFATE 3 ML: .5; 3 SOLUTION RESPIRATORY (INHALATION) at 14:23

## 2020-01-01 RX ADMIN — Medication 1 CAPSULE: at 09:36

## 2020-01-01 RX ADMIN — POTASSIUM CHLORIDE 20 MEQ: 750 TABLET, FILM COATED, EXTENDED RELEASE ORAL at 13:48

## 2020-01-01 RX ADMIN — ARFORMOTEROL TARTRATE 15 MCG: 15 SOLUTION RESPIRATORY (INHALATION) at 22:59

## 2020-01-01 RX ADMIN — BUDESONIDE 500 MCG: 0.5 INHALANT RESPIRATORY (INHALATION) at 20:38

## 2020-01-01 RX ADMIN — Medication 10 ML: at 13:26

## 2020-01-01 RX ADMIN — ARFORMOTEROL TARTRATE 15 MCG: 15 SOLUTION RESPIRATORY (INHALATION) at 23:13

## 2020-01-01 RX ADMIN — CEFEPIME HYDROCHLORIDE 2 G: 2 INJECTION, POWDER, FOR SOLUTION INTRAVENOUS at 23:49

## 2020-01-01 RX ADMIN — DOCUSATE SODIUM 100 MG: 100 CAPSULE, LIQUID FILLED ORAL at 08:42

## 2020-01-01 RX ADMIN — PANTOPRAZOLE SODIUM 40 MG: 40 TABLET, DELAYED RELEASE ORAL at 07:18

## 2020-01-01 RX ADMIN — SUCRALFATE 1 G: 1 TABLET ORAL at 21:48

## 2020-01-01 RX ADMIN — Medication 10 ML: at 07:13

## 2020-01-01 RX ADMIN — ALBUMIN (HUMAN) 12.5 G: 0.25 INJECTION, SOLUTION INTRAVENOUS at 17:53

## 2020-01-01 RX ADMIN — DIGOXIN 0.12 MG: 125 TABLET ORAL at 10:26

## 2020-01-01 RX ADMIN — MAGNESIUM OXIDE TAB 400 MG (241.3 MG ELEMENTAL MG) 800 MG: 400 (241.3 MG) TAB at 08:41

## 2020-01-01 RX ADMIN — SUCRALFATE 1 G: 1 TABLET ORAL at 16:23

## 2020-01-01 RX ADMIN — TAMSULOSIN HYDROCHLORIDE 0.4 MG: 0.4 CAPSULE ORAL at 08:25

## 2020-01-01 RX ADMIN — IPRATROPIUM BROMIDE AND ALBUTEROL SULFATE 3 ML: .5; 3 SOLUTION RESPIRATORY (INHALATION) at 08:43

## 2020-01-01 RX ADMIN — SILDENAFIL CITRATE 10 MG: 20 TABLET ORAL at 22:18

## 2020-01-01 RX ADMIN — Medication 1 CAPSULE: at 08:36

## 2020-01-01 RX ADMIN — PANTOPRAZOLE SODIUM 40 MG: 40 TABLET, DELAYED RELEASE ORAL at 07:35

## 2020-01-01 RX ADMIN — MAGNESIUM OXIDE TAB 400 MG (241.3 MG ELEMENTAL MG) 800 MG: 400 (241.3 MG) TAB at 17:52

## 2020-01-01 RX ADMIN — POTASSIUM CHLORIDE 20 MEQ: 750 TABLET, FILM COATED, EXTENDED RELEASE ORAL at 13:20

## 2020-01-01 RX ADMIN — ARFORMOTEROL TARTRATE 15 MCG: 15 SOLUTION RESPIRATORY (INHALATION) at 10:47

## 2020-01-01 RX ADMIN — Medication 1 PACKET: at 16:48

## 2020-01-01 RX ADMIN — SILDENAFIL CITRATE 20 MG: 20 TABLET ORAL at 23:09

## 2020-01-01 RX ADMIN — LEVETIRACETAM 250 MG: 250 TABLET ORAL at 08:33

## 2020-01-01 RX ADMIN — HYDRALAZINE HYDROCHLORIDE 10 MG: 10 TABLET, FILM COATED ORAL at 09:42

## 2020-01-01 RX ADMIN — MEROPENEM 500 MG: 500 INJECTION, POWDER, FOR SOLUTION INTRAVENOUS at 02:00

## 2020-01-01 RX ADMIN — ARFORMOTEROL TARTRATE 15 MCG: 15 SOLUTION RESPIRATORY (INHALATION) at 20:38

## 2020-01-01 RX ADMIN — Medication 1 CAPSULE: at 08:57

## 2020-01-01 RX ADMIN — ALLOPURINOL 100 MG: 100 TABLET ORAL at 08:59

## 2020-01-01 RX ADMIN — LEVOFLOXACIN 750 MG: 5 INJECTION, SOLUTION INTRAVENOUS at 16:31

## 2020-01-01 RX ADMIN — ALPRAZOLAM 0.25 MG: 0.25 TABLET ORAL at 09:42

## 2020-01-01 RX ADMIN — TAMSULOSIN HYDROCHLORIDE 0.8 MG: 0.4 CAPSULE ORAL at 23:22

## 2020-01-01 RX ADMIN — MEROPENEM 500 MG: 500 INJECTION, POWDER, FOR SOLUTION INTRAVENOUS at 23:26

## 2020-01-01 RX ADMIN — CLONAZEPAM 0.5 MG: 0.5 TABLET ORAL at 21:20

## 2020-01-01 RX ADMIN — MAGNESIUM OXIDE TAB 400 MG (241.3 MG ELEMENTAL MG) 400 MG: 400 (241.3 MG) TAB at 09:47

## 2020-01-01 RX ADMIN — CLONAZEPAM 0.25 MG: 0.5 TABLET ORAL at 18:04

## 2020-01-01 RX ADMIN — PANTOPRAZOLE SODIUM 40 MG: 40 TABLET, DELAYED RELEASE ORAL at 07:04

## 2020-01-01 RX ADMIN — ARFORMOTEROL TARTRATE 15 MCG: 15 SOLUTION RESPIRATORY (INHALATION) at 10:12

## 2020-01-01 RX ADMIN — POTASSIUM CHLORIDE 40 MEQ: 750 TABLET, FILM COATED, EXTENDED RELEASE ORAL at 09:11

## 2020-01-01 RX ADMIN — Medication 1 G: at 09:04

## 2020-01-01 RX ADMIN — LEVETIRACETAM 125 MG: 250 TABLET ORAL at 09:12

## 2020-01-01 RX ADMIN — TAMSULOSIN HYDROCHLORIDE 0.4 MG: 0.4 CAPSULE ORAL at 09:34

## 2020-01-01 RX ADMIN — Medication 1 CAPSULE: at 09:28

## 2020-01-01 RX ADMIN — CLONAZEPAM 0.25 MG: 1 TABLET ORAL at 08:33

## 2020-01-01 RX ADMIN — Medication 10 ML: at 23:42

## 2020-01-01 RX ADMIN — POTASSIUM CHLORIDE 20 MEQ: 750 TABLET, FILM COATED, EXTENDED RELEASE ORAL at 08:39

## 2020-01-01 RX ADMIN — MIDODRINE HYDROCHLORIDE 5 MG: 5 TABLET ORAL at 09:06

## 2020-01-01 RX ADMIN — Medication 1 CAPSULE: at 09:21

## 2020-01-01 RX ADMIN — PANTOPRAZOLE SODIUM 40 MG: 40 TABLET, DELAYED RELEASE ORAL at 07:30

## 2020-01-01 RX ADMIN — PIPERACILLIN AND TAZOBACTAM 3.38 G: 3; .375 INJECTION, POWDER, LYOPHILIZED, FOR SOLUTION INTRAVENOUS at 05:18

## 2020-01-01 RX ADMIN — MEROPENEM 500 MG: 500 INJECTION, POWDER, FOR SOLUTION INTRAVENOUS at 17:51

## 2020-01-01 RX ADMIN — LEVETIRACETAM 250 MG: 250 TABLET ORAL at 09:55

## 2020-01-01 RX ADMIN — BUDESONIDE 500 MCG: 0.5 INHALANT RESPIRATORY (INHALATION) at 20:29

## 2020-01-01 RX ADMIN — IPRATROPIUM BROMIDE AND ALBUTEROL SULFATE 3 ML: .5; 3 SOLUTION RESPIRATORY (INHALATION) at 07:09

## 2020-01-01 RX ADMIN — LEVETIRACETAM 250 MG: 250 TABLET ORAL at 17:42

## 2020-01-01 RX ADMIN — SUCRALFATE 1 G: 1 TABLET ORAL at 12:17

## 2020-01-01 RX ADMIN — SUCRALFATE 1 G: 1 TABLET ORAL at 17:43

## 2020-01-01 RX ADMIN — Medication 10 ML: at 23:31

## 2020-01-01 RX ADMIN — CLONAZEPAM 0.5 MG: 0.5 TABLET ORAL at 21:03

## 2020-01-01 RX ADMIN — LORAZEPAM 1 MG: 2 INJECTION INTRAMUSCULAR; INTRAVENOUS at 19:06

## 2020-01-01 RX ADMIN — Medication 10 ML: at 13:06

## 2020-01-01 RX ADMIN — DIPHENHYDRAMINE HYDROCHLORIDE 25 MG: 25 CAPSULE ORAL at 15:09

## 2020-01-01 RX ADMIN — EPOETIN ALFA-EPBX 10000 UNITS: 10000 INJECTION, SOLUTION INTRAVENOUS; SUBCUTANEOUS at 21:04

## 2020-01-01 RX ADMIN — OCTREOTIDE ACETATE 25 MCG: 50 INJECTION, SOLUTION INTRAVENOUS; SUBCUTANEOUS at 18:10

## 2020-01-01 RX ADMIN — Medication 10 ML: at 00:17

## 2020-01-01 RX ADMIN — SODIUM CHLORIDE 300 MG: 900 INJECTION, SOLUTION INTRAVENOUS at 17:14

## 2020-01-01 RX ADMIN — LORAZEPAM 1 MG: 2 INJECTION INTRAMUSCULAR; INTRAVENOUS at 10:09

## 2020-01-01 RX ADMIN — ATORVASTATIN CALCIUM 40 MG: 20 TABLET, FILM COATED ORAL at 08:27

## 2020-01-01 RX ADMIN — PIPERACILLIN AND TAZOBACTAM 3.38 G: 3; .375 INJECTION, POWDER, LYOPHILIZED, FOR SOLUTION INTRAVENOUS at 12:05

## 2020-01-01 RX ADMIN — Medication 1 CAPSULE: at 09:42

## 2020-01-01 RX ADMIN — CALCIUM POLYCARBOPHIL 625 MG: 625 TABLET, FILM COATED ORAL at 09:01

## 2020-01-01 RX ADMIN — SILDENAFIL CITRATE 20 MG: 20 TABLET ORAL at 21:56

## 2020-01-01 RX ADMIN — OCTREOTIDE ACETATE 25 MCG: 50 INJECTION, SOLUTION INTRAVENOUS; SUBCUTANEOUS at 21:08

## 2020-01-01 RX ADMIN — MIDODRINE HYDROCHLORIDE 2.5 MG: 5 TABLET ORAL at 17:04

## 2020-01-01 RX ADMIN — FINASTERIDE 5 MG: 5 TABLET, FILM COATED ORAL at 22:01

## 2020-01-01 RX ADMIN — MEROPENEM 500 MG: 500 INJECTION, POWDER, FOR SOLUTION INTRAVENOUS at 14:11

## 2020-01-01 RX ADMIN — LEVETIRACETAM 250 MG: 250 TABLET ORAL at 08:31

## 2020-01-01 RX ADMIN — OCTREOTIDE ACETATE 25 MCG: 50 INJECTION, SOLUTION INTRAVENOUS; SUBCUTANEOUS at 16:40

## 2020-01-01 RX ADMIN — TAMSULOSIN HYDROCHLORIDE 0.8 MG: 0.4 CAPSULE ORAL at 21:21

## 2020-01-01 RX ADMIN — BUDESONIDE 500 MCG: 0.5 INHALANT RESPIRATORY (INHALATION) at 07:09

## 2020-01-01 RX ADMIN — BUDESONIDE 500 MCG: 0.5 INHALANT RESPIRATORY (INHALATION) at 07:02

## 2020-01-01 RX ADMIN — Medication 10 ML: at 23:48

## 2020-01-01 RX ADMIN — CEFEPIME HYDROCHLORIDE 2 G: 2 INJECTION, POWDER, FOR SOLUTION INTRAVENOUS at 23:09

## 2020-01-01 RX ADMIN — MAGNESIUM OXIDE TAB 400 MG (241.3 MG ELEMENTAL MG) 800 MG: 400 (241.3 MG) TAB at 09:01

## 2020-01-01 RX ADMIN — SUCRALFATE 1 G: 1 TABLET ORAL at 07:14

## 2020-01-01 RX ADMIN — MAGNESIUM OXIDE TAB 400 MG (241.3 MG ELEMENTAL MG) 400 MG: 400 (241.3 MG) TAB at 17:47

## 2020-01-01 RX ADMIN — VENLAFAXINE HYDROCHLORIDE 75 MG: 37.5 CAPSULE, EXTENDED RELEASE ORAL at 09:33

## 2020-01-01 RX ADMIN — VENLAFAXINE HYDROCHLORIDE 150 MG: 150 CAPSULE, EXTENDED RELEASE ORAL at 08:43

## 2020-01-01 RX ADMIN — LEVETIRACETAM 250 MG: 250 TABLET ORAL at 17:08

## 2020-01-01 RX ADMIN — Medication 10 ML: at 07:05

## 2020-01-01 RX ADMIN — GENTAMICIN SULFATE: 1 CREAM TOPICAL at 12:03

## 2020-01-01 RX ADMIN — TAMSULOSIN HYDROCHLORIDE 0.8 MG: 0.4 CAPSULE ORAL at 23:23

## 2020-01-01 RX ADMIN — LEVOFLOXACIN 750 MG: 5 INJECTION, SOLUTION INTRAVENOUS at 16:08

## 2020-01-01 RX ADMIN — MEROPENEM 500 MG: 500 INJECTION, POWDER, FOR SOLUTION INTRAVENOUS at 08:22

## 2020-01-01 RX ADMIN — FINASTERIDE 5 MG: 5 TABLET, FILM COATED ORAL at 08:55

## 2020-01-01 RX ADMIN — SUCRALFATE 1 G: 1 TABLET ORAL at 07:17

## 2020-01-01 RX ADMIN — MEROPENEM 500 MG: 500 INJECTION, POWDER, FOR SOLUTION INTRAVENOUS at 19:56

## 2020-01-01 RX ADMIN — CEFEPIME HYDROCHLORIDE 2 G: 2 INJECTION, POWDER, FOR SOLUTION INTRAVENOUS at 00:09

## 2020-01-01 RX ADMIN — VENLAFAXINE HYDROCHLORIDE 150 MG: 150 CAPSULE, EXTENDED RELEASE ORAL at 09:42

## 2020-01-01 RX ADMIN — SUCRALFATE 1 G: 1 TABLET ORAL at 22:14

## 2020-01-01 RX ADMIN — ARFORMOTEROL TARTRATE 15 MCG: 15 SOLUTION RESPIRATORY (INHALATION) at 20:03

## 2020-01-01 RX ADMIN — Medication 100 MG: at 09:00

## 2020-01-01 RX ADMIN — MAGNESIUM OXIDE TAB 400 MG (241.3 MG ELEMENTAL MG) 800 MG: 400 (241.3 MG) TAB at 17:08

## 2020-01-01 RX ADMIN — PANTOPRAZOLE SODIUM 40 MG: 40 TABLET, DELAYED RELEASE ORAL at 06:34

## 2020-01-01 RX ADMIN — TAMSULOSIN HYDROCHLORIDE 0.4 MG: 0.4 CAPSULE ORAL at 09:53

## 2020-01-01 RX ADMIN — LEVETIRACETAM 250 MG: 250 TABLET ORAL at 20:24

## 2020-01-01 RX ADMIN — MEROPENEM 500 MG: 500 INJECTION, POWDER, FOR SOLUTION INTRAVENOUS at 02:30

## 2020-01-01 RX ADMIN — Medication 10 ML: at 14:20

## 2020-01-01 RX ADMIN — SUCRALFATE 1 G: 1 TABLET ORAL at 06:55

## 2020-01-01 RX ADMIN — SODIUM CHLORIDE 250 ML: 900 INJECTION, SOLUTION INTRAVENOUS at 21:01

## 2020-01-01 RX ADMIN — OCTREOTIDE ACETATE 25 MCG: 50 INJECTION, SOLUTION INTRAVENOUS; SUBCUTANEOUS at 22:12

## 2020-01-01 RX ADMIN — ALPRAZOLAM 0.25 MG: 0.25 TABLET ORAL at 20:31

## 2020-01-01 RX ADMIN — HYDRALAZINE HYDROCHLORIDE 5 MG: 20 INJECTION INTRAMUSCULAR; INTRAVENOUS at 05:09

## 2020-01-01 RX ADMIN — LEVETIRACETAM 250 MG: 250 TABLET ORAL at 17:13

## 2020-01-01 RX ADMIN — Medication 40 ML: at 21:03

## 2020-01-01 RX ADMIN — PANTOPRAZOLE SODIUM 40 MG: 40 TABLET, DELAYED RELEASE ORAL at 09:51

## 2020-01-01 RX ADMIN — LEVETIRACETAM 250 MG: 250 TABLET ORAL at 18:10

## 2020-01-01 RX ADMIN — MEROPENEM 500 MG: 500 INJECTION, POWDER, FOR SOLUTION INTRAVENOUS at 03:18

## 2020-01-01 RX ADMIN — FLUDROCORTISONE ACETATE 0.1 MG: 0.1 TABLET ORAL at 08:24

## 2020-01-01 RX ADMIN — AMOXICILLIN AND CLAVULANATE POTASSIUM 1 TABLET: 875; 125 TABLET, FILM COATED ORAL at 08:27

## 2020-01-01 RX ADMIN — FLUTICASONE PROPIONATE 2 SPRAY: 50 SPRAY, METERED NASAL at 07:00

## 2020-01-01 RX ADMIN — ALLOPURINOL 100 MG: 100 TABLET ORAL at 09:06

## 2020-01-01 RX ADMIN — Medication 10 ML: at 06:46

## 2020-01-01 RX ADMIN — FINASTERIDE 5 MG: 5 TABLET, FILM COATED ORAL at 09:55

## 2020-01-01 RX ADMIN — FINASTERIDE 5 MG: 5 TABLET, FILM COATED ORAL at 08:12

## 2020-01-01 RX ADMIN — WARFARIN SODIUM 2 MG: 1 TABLET ORAL at 16:07

## 2020-01-01 RX ADMIN — Medication 10 ML: at 17:42

## 2020-01-01 RX ADMIN — SILDENAFIL CITRATE 20 MG: 20 TABLET ORAL at 08:55

## 2020-01-01 RX ADMIN — FLUDROCORTISONE ACETATE 0.1 MG: 0.1 TABLET ORAL at 08:01

## 2020-01-01 RX ADMIN — SUCRALFATE 1 G: 1 TABLET ORAL at 11:15

## 2020-01-01 RX ADMIN — IPRATROPIUM BROMIDE AND ALBUTEROL SULFATE 3 ML: .5; 3 SOLUTION RESPIRATORY (INHALATION) at 07:02

## 2020-01-01 RX ADMIN — COLLAGENASE SANTYL: 250 OINTMENT TOPICAL at 18:16

## 2020-01-01 RX ADMIN — CLONAZEPAM 0.25 MG: 0.5 TABLET ORAL at 21:34

## 2020-01-01 RX ADMIN — LEVETIRACETAM 250 MG: 250 TABLET ORAL at 20:06

## 2020-01-01 RX ADMIN — THERA TABS 1 TABLET: TAB at 08:12

## 2020-01-01 RX ADMIN — LEVETIRACETAM 250 MG: 250 TABLET ORAL at 08:13

## 2020-01-01 RX ADMIN — Medication 10 ML: at 05:26

## 2020-01-01 RX ADMIN — OCTREOTIDE ACETATE 25 MCG: 50 INJECTION, SOLUTION INTRAVENOUS; SUBCUTANEOUS at 22:01

## 2020-01-01 RX ADMIN — PANTOPRAZOLE SODIUM 40 MG: 40 TABLET, DELAYED RELEASE ORAL at 07:14

## 2020-01-01 RX ADMIN — MEROPENEM 500 MG: 500 INJECTION, POWDER, FOR SOLUTION INTRAVENOUS at 02:20

## 2020-01-01 RX ADMIN — LEVETIRACETAM 125 MG: 250 TABLET ORAL at 09:33

## 2020-01-01 RX ADMIN — MEROPENEM 500 MG: 500 INJECTION, POWDER, FOR SOLUTION INTRAVENOUS at 06:12

## 2020-01-01 RX ADMIN — CLONAZEPAM 0.5 MG: 0.5 TABLET ORAL at 23:09

## 2020-01-01 RX ADMIN — MEROPENEM 500 MG: 500 INJECTION, POWDER, FOR SOLUTION INTRAVENOUS at 00:43

## 2020-01-01 RX ADMIN — MAGNESIUM OXIDE TAB 400 MG (241.3 MG ELEMENTAL MG) 400 MG: 400 (241.3 MG) TAB at 08:31

## 2020-01-01 RX ADMIN — ONDANSETRON 4 MG: 2 INJECTION INTRAMUSCULAR; INTRAVENOUS at 09:22

## 2020-01-01 RX ADMIN — TAMSULOSIN HYDROCHLORIDE 0.8 MG: 0.4 CAPSULE ORAL at 23:26

## 2020-01-01 RX ADMIN — CEFEPIME HYDROCHLORIDE 2 G: 2 INJECTION, POWDER, FOR SOLUTION INTRAVENOUS at 17:11

## 2020-01-01 RX ADMIN — MIDAZOLAM 2 MG: 1 INJECTION INTRAMUSCULAR; INTRAVENOUS at 13:48

## 2020-01-01 RX ADMIN — CIPROFLOXACIN 750 MG: 500 TABLET, FILM COATED ORAL at 11:25

## 2020-01-01 RX ADMIN — THERA TABS 1 TABLET: TAB at 09:53

## 2020-01-01 RX ADMIN — LEVETIRACETAM 250 MG: 250 TABLET ORAL at 17:02

## 2020-01-01 RX ADMIN — PIPERACILLIN AND TAZOBACTAM 3.38 G: 3; .375 INJECTION, POWDER, LYOPHILIZED, FOR SOLUTION INTRAVENOUS at 21:51

## 2020-01-01 RX ADMIN — CEFEPIME HYDROCHLORIDE 2 G: 2 INJECTION, POWDER, FOR SOLUTION INTRAVENOUS at 00:06

## 2020-01-01 RX ADMIN — EPOETIN ALFA-EPBX 20000 UNITS: 10000 INJECTION, SOLUTION INTRAVENOUS; SUBCUTANEOUS at 23:21

## 2020-01-01 RX ADMIN — MEROPENEM 500 MG: 500 INJECTION, POWDER, FOR SOLUTION INTRAVENOUS at 02:48

## 2020-01-01 RX ADMIN — OCTREOTIDE ACETATE 25 MCG: 50 INJECTION, SOLUTION INTRAVENOUS; SUBCUTANEOUS at 17:25

## 2020-01-01 RX ADMIN — BUMETANIDE 1 MG: 0.25 INJECTION INTRAMUSCULAR; INTRAVENOUS at 11:48

## 2020-01-01 RX ADMIN — THERA TABS 1 TABLET: TAB at 09:34

## 2020-01-01 RX ADMIN — POTASSIUM CHLORIDE 20 MEQ: 750 TABLET, FILM COATED, EXTENDED RELEASE ORAL at 11:49

## 2020-01-01 RX ADMIN — SACUBITRIL AND VALSARTAN 1 TABLET: 24; 26 TABLET, FILM COATED ORAL at 08:33

## 2020-01-01 RX ADMIN — PANTOPRAZOLE SODIUM 40 MG: 40 TABLET, DELAYED RELEASE ORAL at 07:29

## 2020-01-01 RX ADMIN — Medication 10 ML: at 06:35

## 2020-01-01 RX ADMIN — DIGOXIN 0.06 MG: 125 TABLET ORAL at 22:18

## 2020-01-01 RX ADMIN — MEROPENEM 500 MG: 500 INJECTION, POWDER, FOR SOLUTION INTRAVENOUS at 10:51

## 2020-01-01 RX ADMIN — SUCRALFATE 1 G: 1 TABLET ORAL at 07:13

## 2020-01-01 RX ADMIN — Medication 100 MG: at 08:40

## 2020-01-01 RX ADMIN — TAMSULOSIN HYDROCHLORIDE 0.8 MG: 0.4 CAPSULE ORAL at 21:48

## 2020-01-01 RX ADMIN — OCTREOTIDE ACETATE 25 MCG: 50 INJECTION, SOLUTION INTRAVENOUS; SUBCUTANEOUS at 09:07

## 2020-01-01 RX ADMIN — SILDENAFIL CITRATE 20 MG: 20 TABLET ORAL at 23:26

## 2020-01-01 RX ADMIN — FLUDROCORTISONE ACETATE 0.1 MG: 0.1 TABLET ORAL at 11:47

## 2020-01-01 RX ADMIN — ACETYLCYSTEINE 1200 MG: 200 SOLUTION ORAL; RESPIRATORY (INHALATION) at 23:12

## 2020-01-01 RX ADMIN — SUCCINYLCHOLINE CHLORIDE 180 MG: 20 INJECTION, SOLUTION INTRAMUSCULAR; INTRAVENOUS at 14:07

## 2020-01-01 RX ADMIN — ACETYLCYSTEINE 1200 MG: 200 SOLUTION ORAL; RESPIRATORY (INHALATION) at 15:54

## 2020-01-01 RX ADMIN — FINASTERIDE 5 MG: 5 TABLET, FILM COATED ORAL at 12:29

## 2020-01-01 RX ADMIN — AMOXICILLIN AND CLAVULANATE POTASSIUM 1 TABLET: 875; 125 TABLET, FILM COATED ORAL at 08:40

## 2020-01-01 RX ADMIN — MAGNESIUM OXIDE TAB 400 MG (241.3 MG ELEMENTAL MG) 800 MG: 400 (241.3 MG) TAB at 08:40

## 2020-01-01 RX ADMIN — MEROPENEM 500 MG: 500 INJECTION, POWDER, FOR SOLUTION INTRAVENOUS at 16:41

## 2020-01-01 RX ADMIN — LEVOFLOXACIN 750 MG: 5 INJECTION, SOLUTION INTRAVENOUS at 14:46

## 2020-01-01 RX ADMIN — SUCRALFATE 1 G: 1 TABLET ORAL at 07:12

## 2020-01-01 RX ADMIN — HYDRALAZINE HYDROCHLORIDE 10 MG: 10 TABLET, FILM COATED ORAL at 09:09

## 2020-01-01 RX ADMIN — SILDENAFIL CITRATE 20 MG: 20 TABLET ORAL at 09:12

## 2020-01-01 RX ADMIN — Medication 100 MG: at 09:55

## 2020-01-01 RX ADMIN — SUCRALFATE 1 G: 1 TABLET ORAL at 06:30

## 2020-01-01 RX ADMIN — WARFARIN SODIUM 4 MG: 1 TABLET ORAL at 16:28

## 2020-01-01 RX ADMIN — CEFEPIME HYDROCHLORIDE 2 G: 2 INJECTION, POWDER, FOR SOLUTION INTRAVENOUS at 17:05

## 2020-01-01 RX ADMIN — Medication 120 MCG: at 14:07

## 2020-01-01 RX ADMIN — VENLAFAXINE HYDROCHLORIDE 75 MG: 37.5 CAPSULE, EXTENDED RELEASE ORAL at 08:59

## 2020-01-01 RX ADMIN — Medication 30 ML: at 14:58

## 2020-01-01 RX ADMIN — LEVETIRACETAM 125 MG: 250 TABLET ORAL at 08:42

## 2020-01-01 RX ADMIN — ATORVASTATIN CALCIUM 40 MG: 20 TABLET, FILM COATED ORAL at 08:33

## 2020-01-01 RX ADMIN — ACETYLCYSTEINE 1200 MG: 200 SOLUTION ORAL; RESPIRATORY (INHALATION) at 19:56

## 2020-01-01 RX ADMIN — MAGNESIUM OXIDE TAB 400 MG (241.3 MG ELEMENTAL MG) 400 MG: 400 (241.3 MG) TAB at 18:53

## 2020-01-01 RX ADMIN — SILDENAFIL CITRATE 10 MG: 20 TABLET ORAL at 17:25

## 2020-01-01 RX ADMIN — SUCRALFATE 1 G: 1 TABLET ORAL at 07:29

## 2020-01-01 RX ADMIN — Medication 1 CAPSULE: at 08:47

## 2020-01-01 RX ADMIN — LEVETIRACETAM 250 MG: 250 TABLET ORAL at 11:47

## 2020-01-01 RX ADMIN — BUDESONIDE 500 MCG: 0.5 INHALANT RESPIRATORY (INHALATION) at 19:57

## 2020-01-01 RX ADMIN — MEROPENEM 500 MG: 500 INJECTION, POWDER, FOR SOLUTION INTRAVENOUS at 03:31

## 2020-01-01 RX ADMIN — SUCRALFATE 1 G: 1 TABLET ORAL at 11:27

## 2020-01-01 RX ADMIN — TAMSULOSIN HYDROCHLORIDE 0.8 MG: 0.4 CAPSULE ORAL at 22:50

## 2020-01-01 RX ADMIN — THERA TABS 1 TABLET: TAB at 08:36

## 2020-01-01 RX ADMIN — Medication 30 ML: at 14:51

## 2020-01-01 RX ADMIN — LEVETIRACETAM 125 MG: 250 TABLET ORAL at 18:31

## 2020-01-01 RX ADMIN — SODIUM CHLORIDE 250 ML: 900 INJECTION, SOLUTION INTRAVENOUS at 15:28

## 2020-01-01 RX ADMIN — TAMSULOSIN HYDROCHLORIDE 0.4 MG: 0.4 CAPSULE ORAL at 09:32

## 2020-01-01 RX ADMIN — CLONAZEPAM 0.25 MG: 1 TABLET ORAL at 17:40

## 2020-01-01 RX ADMIN — PIPERACILLIN AND TAZOBACTAM 3.38 G: 3; .375 INJECTION, POWDER, LYOPHILIZED, FOR SOLUTION INTRAVENOUS at 21:45

## 2020-01-01 RX ADMIN — SILDENAFIL CITRATE 20 MG: 20 TABLET ORAL at 21:48

## 2020-01-01 RX ADMIN — POLYETHYLENE GLYCOL 3350 17 G: 17 POWDER, FOR SOLUTION ORAL at 09:11

## 2020-01-01 RX ADMIN — FINASTERIDE 5 MG: 5 TABLET, FILM COATED ORAL at 09:45

## 2020-01-01 RX ADMIN — HYDRALAZINE HYDROCHLORIDE 20 MG: 20 INJECTION INTRAMUSCULAR; INTRAVENOUS at 04:21

## 2020-01-01 RX ADMIN — CLONAZEPAM 0.25 MG: 0.5 TABLET ORAL at 15:23

## 2020-01-01 RX ADMIN — MEROPENEM 500 MG: 500 INJECTION, POWDER, FOR SOLUTION INTRAVENOUS at 16:10

## 2020-01-01 RX ADMIN — BUDESONIDE 500 MCG: 0.5 INHALANT RESPIRATORY (INHALATION) at 10:43

## 2020-01-01 RX ADMIN — Medication 10 ML: at 08:47

## 2020-01-01 RX ADMIN — SUCRALFATE 1 G: 1 TABLET ORAL at 17:33

## 2020-01-01 RX ADMIN — SODIUM CHLORIDE 100 ML: 900 INJECTION, SOLUTION INTRAVENOUS at 18:00

## 2020-01-01 RX ADMIN — Medication 10 ML: at 22:34

## 2020-01-01 RX ADMIN — Medication 20 ML: at 10:58

## 2020-01-01 RX ADMIN — Medication 10 ML: at 23:00

## 2020-01-01 RX ADMIN — MAGNESIUM OXIDE TAB 400 MG (241.3 MG ELEMENTAL MG) 800 MG: 400 (241.3 MG) TAB at 11:47

## 2020-01-01 RX ADMIN — OCTREOTIDE ACETATE 25 MCG: 50 INJECTION, SOLUTION INTRAVENOUS; SUBCUTANEOUS at 17:06

## 2020-01-01 RX ADMIN — MAGNESIUM OXIDE TAB 400 MG (241.3 MG ELEMENTAL MG) 800 MG: 400 (241.3 MG) TAB at 17:19

## 2020-01-01 RX ADMIN — SUCRALFATE 1 G: 1 TABLET ORAL at 23:26

## 2020-01-01 RX ADMIN — SUCRALFATE 1 G: 1 TABLET ORAL at 17:20

## 2020-01-01 RX ADMIN — ARFORMOTEROL TARTRATE 15 MCG: 15 SOLUTION RESPIRATORY (INHALATION) at 10:27

## 2020-01-01 RX ADMIN — MEROPENEM 500 MG: 500 INJECTION, POWDER, FOR SOLUTION INTRAVENOUS at 20:37

## 2020-01-01 RX ADMIN — PERFLUTREN 2 ML: 6.52 INJECTION, SUSPENSION INTRAVENOUS at 11:36

## 2020-01-01 RX ADMIN — SODIUM CHLORIDE 250 ML: 900 INJECTION, SOLUTION INTRAVENOUS at 12:59

## 2020-01-01 RX ADMIN — Medication 10 ML: at 13:19

## 2020-01-01 RX ADMIN — VENLAFAXINE HYDROCHLORIDE 150 MG: 150 CAPSULE, EXTENDED RELEASE ORAL at 08:14

## 2020-01-01 RX ADMIN — POTASSIUM CHLORIDE 40 MEQ: 750 TABLET, FILM COATED, EXTENDED RELEASE ORAL at 09:00

## 2020-01-01 RX ADMIN — IPRATROPIUM BROMIDE AND ALBUTEROL SULFATE 3 ML: .5; 3 SOLUTION RESPIRATORY (INHALATION) at 12:55

## 2020-01-01 RX ADMIN — ALLOPURINOL 100 MG: 100 TABLET ORAL at 09:32

## 2020-01-01 RX ADMIN — SUCRALFATE 1 G: 1 TABLET ORAL at 21:02

## 2020-01-01 RX ADMIN — ARFORMOTEROL TARTRATE 15 MCG: 15 SOLUTION RESPIRATORY (INHALATION) at 21:18

## 2020-01-01 RX ADMIN — Medication 10 ML: at 21:48

## 2020-01-01 RX ADMIN — MEROPENEM 500 MG: 500 INJECTION, POWDER, FOR SOLUTION INTRAVENOUS at 04:03

## 2020-01-01 RX ADMIN — Medication 10 ML: at 21:32

## 2020-01-01 RX ADMIN — FINASTERIDE 5 MG: 5 TABLET, FILM COATED ORAL at 21:29

## 2020-01-01 RX ADMIN — OCTREOTIDE ACETATE 25 MCG: 50 INJECTION, SOLUTION INTRAVENOUS; SUBCUTANEOUS at 09:05

## 2020-01-01 RX ADMIN — MIDODRINE HYDROCHLORIDE 5 MG: 5 TABLET ORAL at 18:13

## 2020-01-01 RX ADMIN — ARFORMOTEROL TARTRATE 15 MCG: 15 SOLUTION RESPIRATORY (INHALATION) at 08:40

## 2020-01-01 RX ADMIN — Medication 1 CAPSULE: at 09:11

## 2020-01-01 RX ADMIN — ARFORMOTEROL TARTRATE 15 MCG: 15 SOLUTION RESPIRATORY (INHALATION) at 20:29

## 2020-01-01 RX ADMIN — CLONAZEPAM 0.5 MG: 0.5 TABLET ORAL at 22:23

## 2020-01-01 RX ADMIN — Medication 1 PACKET: at 17:00

## 2020-01-01 RX ADMIN — Medication 10 ML: at 13:21

## 2020-01-01 RX ADMIN — MEROPENEM 500 MG: 500 INJECTION, POWDER, FOR SOLUTION INTRAVENOUS at 21:12

## 2020-01-01 RX ADMIN — Medication 100 MG: at 08:42

## 2020-01-01 RX ADMIN — SUCRALFATE 1 G: 1 TABLET ORAL at 13:49

## 2020-01-01 RX ADMIN — SPIRONOLACTONE 25 MG: 25 TABLET ORAL at 09:06

## 2020-01-01 RX ADMIN — Medication 10 ML: at 06:56

## 2020-01-01 RX ADMIN — IPRATROPIUM BROMIDE AND ALBUTEROL SULFATE 3 ML: .5; 3 SOLUTION RESPIRATORY (INHALATION) at 07:27

## 2020-01-01 RX ADMIN — Medication 10 ML: at 07:07

## 2020-01-01 RX ADMIN — MEROPENEM 500 MG: 500 INJECTION, POWDER, FOR SOLUTION INTRAVENOUS at 05:30

## 2020-01-01 RX ADMIN — MIDODRINE HYDROCHLORIDE 5 MG: 5 TABLET ORAL at 13:20

## 2020-01-01 RX ADMIN — Medication: at 15:32

## 2020-01-01 RX ADMIN — VENLAFAXINE HYDROCHLORIDE 75 MG: 37.5 CAPSULE, EXTENDED RELEASE ORAL at 09:00

## 2020-01-01 RX ADMIN — Medication 80 MCG: at 14:37

## 2020-01-01 RX ADMIN — Medication 10 ML: at 17:54

## 2020-01-01 RX ADMIN — SILDENAFIL CITRATE 20 MG: 20 TABLET ORAL at 21:20

## 2020-01-01 RX ADMIN — WATER 1 MG: 1 INJECTION INTRAMUSCULAR; INTRAVENOUS; SUBCUTANEOUS at 11:56

## 2020-01-01 RX ADMIN — Medication 10 ML: at 22:22

## 2020-01-01 RX ADMIN — LORAZEPAM 1 MG: 2 INJECTION INTRAMUSCULAR; INTRAVENOUS at 12:41

## 2020-01-01 RX ADMIN — MIDODRINE HYDROCHLORIDE 2.5 MG: 5 TABLET ORAL at 12:24

## 2020-01-01 RX ADMIN — Medication 10 ML: at 21:12

## 2020-01-01 RX ADMIN — MEROPENEM 500 MG: 500 INJECTION, POWDER, FOR SOLUTION INTRAVENOUS at 22:34

## 2020-01-01 RX ADMIN — OCTREOTIDE ACETATE 25 MCG: 50 INJECTION, SOLUTION INTRAVENOUS; SUBCUTANEOUS at 16:53

## 2020-01-01 RX ADMIN — LEVETIRACETAM 250 MG: 250 TABLET ORAL at 17:30

## 2020-01-01 RX ADMIN — SILDENAFIL CITRATE 20 MG: 20 TABLET ORAL at 17:01

## 2020-01-01 RX ADMIN — SUCRALFATE 1 G: 1 TABLET ORAL at 16:56

## 2020-01-01 RX ADMIN — MEROPENEM 500 MG: 500 INJECTION, POWDER, FOR SOLUTION INTRAVENOUS at 17:57

## 2020-01-01 RX ADMIN — ONDANSETRON 4 MG: 2 INJECTION INTRAMUSCULAR; INTRAVENOUS at 17:48

## 2020-01-01 RX ADMIN — MEROPENEM 500 MG: 500 INJECTION, POWDER, FOR SOLUTION INTRAVENOUS at 17:34

## 2020-01-01 RX ADMIN — SILDENAFIL CITRATE 20 MG: 20 TABLET ORAL at 09:44

## 2020-01-01 RX ADMIN — OCTREOTIDE ACETATE 25 MCG: 50 INJECTION, SOLUTION INTRAVENOUS; SUBCUTANEOUS at 08:55

## 2020-01-01 RX ADMIN — BUDESONIDE 500 MCG: 0.5 INHALANT RESPIRATORY (INHALATION) at 09:34

## 2020-01-01 RX ADMIN — MIDODRINE HYDROCHLORIDE 5 MG: 5 TABLET ORAL at 17:45

## 2020-01-01 RX ADMIN — LEVETIRACETAM 250 MG: 250 TABLET ORAL at 08:28

## 2020-01-01 RX ADMIN — Medication 10 ML: at 04:22

## 2020-01-01 RX ADMIN — ACETYLCYSTEINE 600 MG: 200 SOLUTION ORAL; RESPIRATORY (INHALATION) at 13:40

## 2020-01-01 RX ADMIN — ARFORMOTEROL TARTRATE 15 MCG: 15 SOLUTION RESPIRATORY (INHALATION) at 09:34

## 2020-01-01 RX ADMIN — ASPIRIN 81 MG: 81 TABLET, COATED ORAL at 09:22

## 2020-01-01 RX ADMIN — MAGNESIUM OXIDE TAB 400 MG (241.3 MG ELEMENTAL MG) 800 MG: 400 (241.3 MG) TAB at 08:32

## 2020-01-01 RX ADMIN — SUCRALFATE 1 G: 1 TABLET ORAL at 17:08

## 2020-01-01 RX ADMIN — Medication 10 ML: at 14:03

## 2020-01-01 RX ADMIN — ARFORMOTEROL TARTRATE 15 MCG: 15 SOLUTION RESPIRATORY (INHALATION) at 21:33

## 2020-01-01 RX ADMIN — THERA TABS 1 TABLET: TAB at 08:25

## 2020-01-01 RX ADMIN — SPIRONOLACTONE 25 MG: 25 TABLET ORAL at 09:00

## 2020-01-01 RX ADMIN — MIDODRINE HYDROCHLORIDE 2.5 MG: 5 TABLET ORAL at 09:25

## 2020-01-01 RX ADMIN — Medication 1 PACKET: at 08:33

## 2020-01-01 RX ADMIN — INSULIN LISPRO 2 UNITS: 100 INJECTION, SOLUTION INTRAVENOUS; SUBCUTANEOUS at 11:25

## 2020-01-01 RX ADMIN — Medication 10 ML: at 15:20

## 2020-01-01 RX ADMIN — Medication 100 MG: at 08:36

## 2020-01-01 RX ADMIN — ACETYLCYSTEINE 1200 MG: 200 SOLUTION ORAL; RESPIRATORY (INHALATION) at 21:18

## 2020-01-01 RX ADMIN — Medication 100 MG: at 08:32

## 2020-01-01 RX ADMIN — FINASTERIDE 5 MG: 5 TABLET, FILM COATED ORAL at 09:47

## 2020-01-01 RX ADMIN — HYDRALAZINE HYDROCHLORIDE 10 MG: 20 INJECTION INTRAMUSCULAR; INTRAVENOUS at 23:34

## 2020-01-01 RX ADMIN — MAGNESIUM OXIDE TAB 400 MG (241.3 MG ELEMENTAL MG) 800 MG: 400 (241.3 MG) TAB at 09:36

## 2020-01-01 RX ADMIN — SILDENAFIL CITRATE 20 MG: 20 TABLET ORAL at 17:28

## 2020-01-01 RX ADMIN — LORAZEPAM 1 MG: 2 INJECTION INTRAMUSCULAR; INTRAVENOUS at 11:02

## 2020-01-01 RX ADMIN — MEROPENEM 500 MG: 500 INJECTION, POWDER, FOR SOLUTION INTRAVENOUS at 14:52

## 2020-01-01 RX ADMIN — OCTREOTIDE ACETATE 25 MCG: 50 INJECTION, SOLUTION INTRAVENOUS; SUBCUTANEOUS at 17:13

## 2020-01-01 RX ADMIN — BUDESONIDE 500 MCG: 0.5 INHALANT RESPIRATORY (INHALATION) at 07:03

## 2020-01-01 RX ADMIN — VENLAFAXINE HYDROCHLORIDE 150 MG: 150 CAPSULE, EXTENDED RELEASE ORAL at 08:57

## 2020-01-01 RX ADMIN — ARFORMOTEROL TARTRATE 15 MCG: 15 SOLUTION RESPIRATORY (INHALATION) at 07:46

## 2020-01-01 RX ADMIN — SUCRALFATE 1 G: 1 TABLET ORAL at 23:40

## 2020-01-01 RX ADMIN — ARFORMOTEROL TARTRATE 15 MCG: 15 SOLUTION RESPIRATORY (INHALATION) at 19:48

## 2020-01-01 RX ADMIN — FINASTERIDE 5 MG: 5 TABLET, FILM COATED ORAL at 08:42

## 2020-01-01 RX ADMIN — MIDODRINE HYDROCHLORIDE 5 MG: 5 TABLET ORAL at 17:21

## 2020-01-01 RX ADMIN — ARFORMOTEROL TARTRATE 15 MCG: 15 SOLUTION RESPIRATORY (INHALATION) at 21:43

## 2020-01-01 RX ADMIN — SILDENAFIL CITRATE 20 MG: 20 TABLET ORAL at 08:24

## 2020-01-01 RX ADMIN — COLLAGENASE SANTYL: 250 OINTMENT TOPICAL at 10:13

## 2020-01-01 RX ADMIN — MEROPENEM 500 MG: 500 INJECTION, POWDER, FOR SOLUTION INTRAVENOUS at 14:06

## 2020-01-01 RX ADMIN — VENLAFAXINE HYDROCHLORIDE 150 MG: 150 CAPSULE, EXTENDED RELEASE ORAL at 08:54

## 2020-01-01 RX ADMIN — PANTOPRAZOLE SODIUM 40 MG: 40 TABLET, DELAYED RELEASE ORAL at 07:08

## 2020-01-01 RX ADMIN — MAGNESIUM OXIDE TAB 400 MG (241.3 MG ELEMENTAL MG) 800 MG: 400 (241.3 MG) TAB at 17:38

## 2020-01-01 RX ADMIN — HYDRALAZINE HYDROCHLORIDE 10 MG: 10 TABLET, FILM COATED ORAL at 08:13

## 2020-01-01 RX ADMIN — MAGNESIUM OXIDE TAB 400 MG (241.3 MG ELEMENTAL MG) 800 MG: 400 (241.3 MG) TAB at 18:03

## 2020-01-01 RX ADMIN — SODIUM CHLORIDE 500 ML: 900 INJECTION, SOLUTION INTRAVENOUS at 14:05

## 2020-01-01 RX ADMIN — IPRATROPIUM BROMIDE AND ALBUTEROL SULFATE 3 ML: .5; 3 SOLUTION RESPIRATORY (INHALATION) at 15:44

## 2020-01-01 RX ADMIN — Medication 10 ML: at 21:52

## 2020-01-01 RX ADMIN — EPOETIN ALFA-EPBX 10000 UNITS: 10000 INJECTION, SOLUTION INTRAVENOUS; SUBCUTANEOUS at 21:12

## 2020-01-01 RX ADMIN — HYDRALAZINE HYDROCHLORIDE 20 MG: 20 INJECTION INTRAMUSCULAR; INTRAVENOUS at 20:27

## 2020-01-01 RX ADMIN — FINASTERIDE 5 MG: 5 TABLET, FILM COATED ORAL at 08:26

## 2020-01-01 RX ADMIN — ACETYLCYSTEINE 600 MG: 200 SOLUTION ORAL; RESPIRATORY (INHALATION) at 20:12

## 2020-01-01 RX ADMIN — TAMSULOSIN HYDROCHLORIDE 0.4 MG: 0.4 CAPSULE ORAL at 08:59

## 2020-01-01 RX ADMIN — COLLAGENASE SANTYL: 250 OINTMENT TOPICAL at 11:03

## 2020-01-01 RX ADMIN — DIPHENHYDRAMINE HYDROCHLORIDE 25 MG: 25 CAPSULE ORAL at 23:25

## 2020-01-01 RX ADMIN — CEFEPIME HYDROCHLORIDE 2 G: 2 INJECTION, POWDER, FOR SOLUTION INTRAVENOUS at 08:57

## 2020-01-01 RX ADMIN — MEROPENEM 500 MG: 500 INJECTION, POWDER, FOR SOLUTION INTRAVENOUS at 23:15

## 2020-01-01 RX ADMIN — BUDESONIDE 500 MCG: 0.5 INHALANT RESPIRATORY (INHALATION) at 08:24

## 2020-01-01 RX ADMIN — CLONAZEPAM 0.5 MG: 0.5 TABLET ORAL at 08:00

## 2020-01-01 RX ADMIN — VENLAFAXINE HYDROCHLORIDE 150 MG: 150 CAPSULE, EXTENDED RELEASE ORAL at 08:26

## 2020-01-01 RX ADMIN — CALCIUM POLYCARBOPHIL 625 MG: 625 TABLET, FILM COATED ORAL at 08:47

## 2020-01-01 RX ADMIN — MAGNESIUM OXIDE TAB 400 MG (241.3 MG ELEMENTAL MG) 800 MG: 400 (241.3 MG) TAB at 17:13

## 2020-01-01 RX ADMIN — OXYMETAZOLINE HYDROCHLORIDE 2 SPRAY: 0.05 SPRAY NASAL at 00:03

## 2020-01-01 RX ADMIN — FINASTERIDE 5 MG: 5 TABLET, FILM COATED ORAL at 10:40

## 2020-01-01 RX ADMIN — POTASSIUM CHLORIDE 20 MEQ: 750 TABLET, FILM COATED, EXTENDED RELEASE ORAL at 17:05

## 2020-01-01 RX ADMIN — ARFORMOTEROL TARTRATE 15 MCG: 15 SOLUTION RESPIRATORY (INHALATION) at 07:08

## 2020-01-01 RX ADMIN — OCTREOTIDE ACETATE 25 MCG: 50 INJECTION, SOLUTION INTRAVENOUS; SUBCUTANEOUS at 09:12

## 2020-01-01 RX ADMIN — MAGNESIUM OXIDE TAB 400 MG (241.3 MG ELEMENTAL MG) 800 MG: 400 (241.3 MG) TAB at 08:54

## 2020-01-01 RX ADMIN — LEVETIRACETAM 250 MG: 250 TABLET ORAL at 08:01

## 2020-01-01 RX ADMIN — FLUDROCORTISONE ACETATE 0.1 MG: 0.1 TABLET ORAL at 09:55

## 2020-01-01 RX ADMIN — Medication 1 PACKET: at 08:25

## 2020-01-01 RX ADMIN — FINASTERIDE 5 MG: 5 TABLET, FILM COATED ORAL at 08:30

## 2020-01-01 RX ADMIN — CEFEPIME HYDROCHLORIDE 2 G: 2 INJECTION, POWDER, FOR SOLUTION INTRAVENOUS at 08:40

## 2020-01-01 RX ADMIN — WARFARIN SODIUM 6 MG: 5 TABLET ORAL at 18:13

## 2020-01-01 RX ADMIN — AMOXICILLIN AND CLAVULANATE POTASSIUM 1 TABLET: 875; 125 TABLET, FILM COATED ORAL at 18:53

## 2020-01-01 RX ADMIN — Medication 10 ML: at 05:54

## 2020-01-01 RX ADMIN — BUDESONIDE 500 MCG: 0.5 INHALANT RESPIRATORY (INHALATION) at 19:56

## 2020-01-01 RX ADMIN — MAGNESIUM OXIDE TAB 400 MG (241.3 MG ELEMENTAL MG) 800 MG: 400 (241.3 MG) TAB at 17:06

## 2020-01-01 RX ADMIN — Medication 10 ML: at 07:30

## 2020-01-01 RX ADMIN — MIDODRINE HYDROCHLORIDE 5 MG: 5 TABLET ORAL at 09:53

## 2020-01-01 RX ADMIN — MEROPENEM 500 MG: 500 INJECTION, POWDER, FOR SOLUTION INTRAVENOUS at 18:10

## 2020-01-01 RX ADMIN — FENTANYL CITRATE 50 MCG: 50 INJECTION, SOLUTION INTRAMUSCULAR; INTRAVENOUS at 14:07

## 2020-01-01 RX ADMIN — MEROPENEM 500 MG: 500 INJECTION, POWDER, FOR SOLUTION INTRAVENOUS at 09:42

## 2020-01-01 RX ADMIN — CLONAZEPAM 0.5 MG: 0.5 TABLET ORAL at 21:35

## 2020-01-01 RX ADMIN — HYDRALAZINE HYDROCHLORIDE 20 MG: 20 INJECTION INTRAMUSCULAR; INTRAVENOUS at 04:07

## 2020-01-01 RX ADMIN — DIPHENHYDRAMINE HYDROCHLORIDE 25 MG: 25 CAPSULE ORAL at 01:21

## 2020-01-01 RX ADMIN — SUCRALFATE 1 G: 1 TABLET ORAL at 06:40

## 2020-01-01 RX ADMIN — MELATONIN 2 TABLET: at 09:34

## 2020-01-01 RX ADMIN — BUMETANIDE 0.5 MG: 1 TABLET ORAL at 12:37

## 2020-01-01 RX ADMIN — ARFORMOTEROL TARTRATE 15 MCG: 15 SOLUTION RESPIRATORY (INHALATION) at 20:02

## 2020-01-01 RX ADMIN — CEFEPIME HYDROCHLORIDE 2 G: 2 INJECTION, POWDER, FOR SOLUTION INTRAVENOUS at 23:54

## 2020-01-01 RX ADMIN — ACETYLCYSTEINE 600 MG: 200 SOLUTION ORAL; RESPIRATORY (INHALATION) at 12:55

## 2020-01-01 RX ADMIN — SODIUM CHLORIDE 100 ML: 900 INJECTION, SOLUTION INTRAVENOUS at 11:07

## 2020-01-01 RX ADMIN — FINASTERIDE 5 MG: 5 TABLET, FILM COATED ORAL at 09:32

## 2020-01-01 RX ADMIN — SILDENAFIL CITRATE 20 MG: 20 TABLET ORAL at 22:22

## 2020-01-01 RX ADMIN — SENNOSIDES AND DOCUSATE SODIUM 1 TABLET: 8.6; 5 TABLET ORAL at 09:12

## 2020-01-01 RX ADMIN — LEVETIRACETAM 250 MG: 250 TABLET ORAL at 17:18

## 2020-01-01 RX ADMIN — CLONAZEPAM 0.5 MG: 0.5 TABLET ORAL at 23:45

## 2020-01-01 RX ADMIN — PANTOPRAZOLE SODIUM 40 MG: 40 TABLET, DELAYED RELEASE ORAL at 07:17

## 2020-01-01 RX ADMIN — DOCUSATE SODIUM 100 MG: 100 CAPSULE, LIQUID FILLED ORAL at 08:26

## 2020-01-01 RX ADMIN — LEVETIRACETAM 250 MG: 250 TABLET ORAL at 17:00

## 2020-01-01 RX ADMIN — Medication 1 G: at 17:09

## 2020-01-01 RX ADMIN — VENLAFAXINE HYDROCHLORIDE 150 MG: 150 CAPSULE, EXTENDED RELEASE ORAL at 08:47

## 2020-01-01 RX ADMIN — WARFARIN SODIUM 2 MG: 1 TABLET ORAL at 09:07

## 2020-01-01 RX ADMIN — Medication 10 ML: at 04:07

## 2020-01-01 RX ADMIN — EPOETIN ALFA-EPBX 20000 UNITS: 10000 INJECTION, SOLUTION INTRAVENOUS; SUBCUTANEOUS at 21:55

## 2020-01-01 RX ADMIN — MIDODRINE HYDROCHLORIDE 5 MG: 5 TABLET ORAL at 16:28

## 2020-01-01 RX ADMIN — SILDENAFIL CITRATE 20 MG: 20 TABLET ORAL at 21:54

## 2020-01-01 RX ADMIN — OCTREOTIDE ACETATE 25 MCG: 50 INJECTION, SOLUTION INTRAVENOUS; SUBCUTANEOUS at 08:57

## 2020-01-01 RX ADMIN — LEVETIRACETAM 250 MG: 250 TABLET ORAL at 09:04

## 2020-01-01 RX ADMIN — SPIRONOLACTONE 25 MG: 25 TABLET ORAL at 09:33

## 2020-01-01 RX ADMIN — TAMSULOSIN HYDROCHLORIDE 0.4 MG: 0.4 CAPSULE ORAL at 09:04

## 2020-01-01 RX ADMIN — ARFORMOTEROL TARTRATE 15 MCG: 15 SOLUTION RESPIRATORY (INHALATION) at 07:44

## 2020-01-01 RX ADMIN — CIPROFLOXACIN 750 MG: 500 TABLET, FILM COATED ORAL at 22:00

## 2020-01-01 RX ADMIN — MEROPENEM 500 MG: 500 INJECTION, POWDER, FOR SOLUTION INTRAVENOUS at 13:54

## 2020-01-01 RX ADMIN — PIPERACILLIN AND TAZOBACTAM 3.38 G: 3; .375 INJECTION, POWDER, LYOPHILIZED, FOR SOLUTION INTRAVENOUS at 04:47

## 2020-01-01 RX ADMIN — EPOETIN ALFA-EPBX 20000 UNITS: 10000 INJECTION, SOLUTION INTRAVENOUS; SUBCUTANEOUS at 22:53

## 2020-01-01 RX ADMIN — MEROPENEM 500 MG: 500 INJECTION, POWDER, FOR SOLUTION INTRAVENOUS at 20:16

## 2020-01-01 RX ADMIN — WARFARIN SODIUM 1 MG: 1 TABLET ORAL at 17:43

## 2020-01-01 RX ADMIN — WARFARIN SODIUM 4 MG: 1 TABLET ORAL at 16:52

## 2020-01-01 RX ADMIN — FINASTERIDE 5 MG: 5 TABLET, FILM COATED ORAL at 22:52

## 2020-01-01 RX ADMIN — ARFORMOTEROL TARTRATE 15 MCG: 15 SOLUTION RESPIRATORY (INHALATION) at 20:37

## 2020-01-01 RX ADMIN — BUDESONIDE 500 MCG: 0.5 INHALANT RESPIRATORY (INHALATION) at 07:19

## 2020-01-01 RX ADMIN — ARFORMOTEROL TARTRATE 15 MCG: 15 SOLUTION RESPIRATORY (INHALATION) at 07:11

## 2020-01-01 RX ADMIN — THERA TABS 1 TABLET: TAB at 08:57

## 2020-01-01 RX ADMIN — DRONABINOL 2.5 MG: 2.5 CAPSULE ORAL at 15:41

## 2020-01-01 RX ADMIN — ARFORMOTEROL TARTRATE 15 MCG: 15 SOLUTION RESPIRATORY (INHALATION) at 19:56

## 2020-01-01 RX ADMIN — DIGOXIN 0.12 MG: 125 TABLET ORAL at 09:22

## 2020-01-01 RX ADMIN — MEROPENEM 500 MG: 500 INJECTION, POWDER, FOR SOLUTION INTRAVENOUS at 04:07

## 2020-01-01 RX ADMIN — BUDESONIDE 500 MCG: 0.5 INHALANT RESPIRATORY (INHALATION) at 07:22

## 2020-01-01 RX ADMIN — ARFORMOTEROL TARTRATE 15 MCG: 15 SOLUTION RESPIRATORY (INHALATION) at 10:15

## 2020-01-01 RX ADMIN — OCTREOTIDE ACETATE 25 MCG: 50 INJECTION, SOLUTION INTRAVENOUS; SUBCUTANEOUS at 11:53

## 2020-01-01 RX ADMIN — Medication 10 ML: at 06:43

## 2020-01-01 RX ADMIN — MIDODRINE HYDROCHLORIDE 5 MG: 5 TABLET ORAL at 08:12

## 2020-01-01 RX ADMIN — WARFARIN SODIUM 4 MG: 1 TABLET ORAL at 16:48

## 2020-01-01 RX ADMIN — EPOETIN ALFA-EPBX 20000 UNITS: 10000 INJECTION, SOLUTION INTRAVENOUS; SUBCUTANEOUS at 00:32

## 2020-01-01 RX ADMIN — VENLAFAXINE HYDROCHLORIDE 150 MG: 150 CAPSULE, EXTENDED RELEASE ORAL at 08:40

## 2020-01-01 RX ADMIN — LEVETIRACETAM 250 MG: 250 TABLET ORAL at 17:07

## 2020-01-01 RX ADMIN — LEVETIRACETAM 250 MG: 250 TABLET ORAL at 19:09

## 2020-01-01 RX ADMIN — THERA TABS 1 TABLET: TAB at 09:00

## 2020-01-01 RX ADMIN — LEVOFLOXACIN 750 MG: 5 INJECTION, SOLUTION INTRAVENOUS at 15:00

## 2020-01-01 RX ADMIN — CLONAZEPAM 0.5 MG: 0.5 TABLET ORAL at 21:54

## 2020-01-01 RX ADMIN — CALCIUM POLYCARBOPHIL 625 MG: 625 TABLET, FILM COATED ORAL at 08:31

## 2020-01-01 RX ADMIN — WARFARIN SODIUM 4 MG: 4 TABLET ORAL at 17:40

## 2020-01-01 RX ADMIN — ALBUMIN (HUMAN) 12.5 G: 0.25 INJECTION, SOLUTION INTRAVENOUS at 17:29

## 2020-01-01 RX ADMIN — Medication 1 CAPSULE: at 08:54

## 2020-01-01 RX ADMIN — TAMSULOSIN HYDROCHLORIDE 0.4 MG: 0.4 CAPSULE ORAL at 09:12

## 2020-01-01 RX ADMIN — Medication 1 CAPSULE: at 09:55

## 2020-01-01 RX ADMIN — MEROPENEM 500 MG: 500 INJECTION, POWDER, FOR SOLUTION INTRAVENOUS at 23:03

## 2020-01-01 RX ADMIN — WARFARIN SODIUM 2 MG: 1 TABLET ORAL at 18:54

## 2020-01-01 RX ADMIN — IRON SUCROSE 100 MG: 20 INJECTION, SOLUTION INTRAVENOUS at 11:34

## 2020-01-01 RX ADMIN — CIPROFLOXACIN 750 MG: 500 TABLET, FILM COATED ORAL at 22:15

## 2020-01-01 RX ADMIN — VENLAFAXINE HYDROCHLORIDE 150 MG: 150 CAPSULE, EXTENDED RELEASE ORAL at 09:22

## 2020-01-01 RX ADMIN — BUDESONIDE 500 MCG: 0.5 INHALANT RESPIRATORY (INHALATION) at 19:42

## 2020-01-01 RX ADMIN — THERA TABS 1 TABLET: TAB at 08:20

## 2020-01-01 RX ADMIN — DIGOXIN 0.12 MG: 125 TABLET ORAL at 10:40

## 2020-01-01 RX ADMIN — MAGNESIUM SULFATE HEPTAHYDRATE 1 G: 1 INJECTION, SOLUTION INTRAVENOUS at 07:30

## 2020-01-01 RX ADMIN — ONDANSETRON 4 MG: 2 INJECTION INTRAMUSCULAR; INTRAVENOUS at 09:25

## 2020-01-01 RX ADMIN — TAMSULOSIN HYDROCHLORIDE 0.8 MG: 0.4 CAPSULE ORAL at 21:30

## 2020-01-01 RX ADMIN — SUCRALFATE 1 G: 1 TABLET ORAL at 12:13

## 2020-01-01 RX ADMIN — CEFEPIME HYDROCHLORIDE 2 G: 2 INJECTION, POWDER, FOR SOLUTION INTRAVENOUS at 17:20

## 2020-01-01 RX ADMIN — ALLOPURINOL 100 MG: 100 TABLET ORAL at 08:43

## 2020-01-01 RX ADMIN — POTASSIUM CHLORIDE 10 MEQ: 750 TABLET, FILM COATED, EXTENDED RELEASE ORAL at 08:01

## 2020-01-01 RX ADMIN — OCTREOTIDE ACETATE 25 MCG: 50 INJECTION, SOLUTION INTRAVENOUS; SUBCUTANEOUS at 23:17

## 2020-01-01 RX ADMIN — ACETAMINOPHEN 650 MG: 325 TABLET, FILM COATED ORAL at 22:13

## 2020-01-01 RX ADMIN — SUCRALFATE 1 G: 1 TABLET ORAL at 13:05

## 2020-01-01 RX ADMIN — Medication 10 ML: at 07:06

## 2020-01-01 RX ADMIN — SUCRALFATE 1 G: 1 TABLET ORAL at 21:56

## 2020-01-01 RX ADMIN — CIPROFLOXACIN 750 MG: 500 TABLET, FILM COATED ORAL at 23:00

## 2020-01-01 RX ADMIN — Medication 1 CAPSULE: at 08:59

## 2020-01-01 RX ADMIN — OCTREOTIDE ACETATE 25 MCG: 50 INJECTION, SOLUTION INTRAVENOUS; SUBCUTANEOUS at 21:58

## 2020-01-01 RX ADMIN — CEFEPIME HYDROCHLORIDE 2 G: 2 INJECTION, POWDER, FOR SOLUTION INTRAVENOUS at 17:44

## 2020-01-01 RX ADMIN — ACETYLCYSTEINE 1200 MG: 200 SOLUTION ORAL; RESPIRATORY (INHALATION) at 20:38

## 2020-01-01 RX ADMIN — GENTAMICIN SULFATE: 1 CREAM TOPICAL at 20:57

## 2020-01-01 RX ADMIN — Medication 10 ML: at 22:29

## 2020-01-01 RX ADMIN — PANTOPRAZOLE SODIUM 40 MG: 40 TABLET, DELAYED RELEASE ORAL at 06:07

## 2020-01-01 RX ADMIN — VENLAFAXINE HYDROCHLORIDE 150 MG: 150 CAPSULE, EXTENDED RELEASE ORAL at 09:53

## 2020-01-01 RX ADMIN — VENLAFAXINE HYDROCHLORIDE 150 MG: 150 CAPSULE, EXTENDED RELEASE ORAL at 09:44

## 2020-01-01 RX ADMIN — SUCRALFATE 1 G: 1 TABLET ORAL at 21:20

## 2020-01-01 RX ADMIN — CLONAZEPAM 0.25 MG: 0.5 TABLET ORAL at 08:50

## 2020-01-01 RX ADMIN — THERA TABS 1 TABLET: TAB at 08:59

## 2020-01-01 RX ADMIN — SENNOSIDES AND DOCUSATE SODIUM 1 TABLET: 8.6; 5 TABLET ORAL at 08:42

## 2020-01-01 RX ADMIN — CEFEPIME HYDROCHLORIDE 2 G: 2 INJECTION, POWDER, FOR SOLUTION INTRAVENOUS at 23:39

## 2020-01-01 RX ADMIN — Medication 10 ML: at 21:23

## 2020-01-01 RX ADMIN — TAMSULOSIN HYDROCHLORIDE 0.8 MG: 0.4 CAPSULE ORAL at 23:00

## 2020-01-01 RX ADMIN — FLUDROCORTISONE ACETATE 0.1 MG: 0.1 TABLET ORAL at 09:51

## 2020-01-01 RX ADMIN — FLUDROCORTISONE ACETATE 0.1 MG: 0.1 TABLET ORAL at 08:44

## 2020-01-01 RX ADMIN — ACETYLCYSTEINE 1200 MG: 200 SOLUTION ORAL; RESPIRATORY (INHALATION) at 15:52

## 2020-01-01 RX ADMIN — CEFEPIME HYDROCHLORIDE 2 G: 2 INJECTION, POWDER, FOR SOLUTION INTRAVENOUS at 00:46

## 2020-01-01 RX ADMIN — VENLAFAXINE HYDROCHLORIDE 150 MG: 150 CAPSULE, EXTENDED RELEASE ORAL at 09:51

## 2020-01-01 RX ADMIN — ALLOPURINOL 100 MG: 100 TABLET ORAL at 09:12

## 2020-01-01 RX ADMIN — Medication 100 MG: at 08:24

## 2020-01-01 RX ADMIN — MIDODRINE HYDROCHLORIDE 5 MG: 5 TABLET ORAL at 08:40

## 2020-01-01 RX ADMIN — MEROPENEM 500 MG: 500 INJECTION, POWDER, FOR SOLUTION INTRAVENOUS at 08:13

## 2020-01-01 RX ADMIN — LEVETIRACETAM 250 MG: 250 TABLET ORAL at 17:33

## 2020-01-01 RX ADMIN — CEFEPIME HYDROCHLORIDE 2 G: 2 INJECTION, POWDER, FOR SOLUTION INTRAVENOUS at 16:53

## 2020-01-01 RX ADMIN — COLLAGENASE SANTYL: 250 OINTMENT TOPICAL at 08:48

## 2020-01-01 RX ADMIN — MIDODRINE HYDROCHLORIDE 2.5 MG: 5 TABLET ORAL at 13:38

## 2020-01-01 RX ADMIN — BUDESONIDE 500 MCG: 0.5 INHALANT RESPIRATORY (INHALATION) at 20:12

## 2020-01-01 RX ADMIN — CLONAZEPAM 0.5 MG: 0.5 TABLET ORAL at 19:49

## 2020-01-01 RX ADMIN — CLONAZEPAM 0.5 MG: 0.5 TABLET ORAL at 12:34

## 2020-01-01 RX ADMIN — DIGOXIN 0.12 MG: 125 TABLET ORAL at 09:15

## 2020-01-01 RX ADMIN — Medication 1 CAPSULE: at 09:04

## 2020-01-01 RX ADMIN — SUCRALFATE 1 G: 1 TABLET ORAL at 06:07

## 2020-01-01 RX ADMIN — BUDESONIDE 500 MCG: 0.5 INHALANT RESPIRATORY (INHALATION) at 08:17

## 2020-01-01 RX ADMIN — ARFORMOTEROL TARTRATE 15 MCG: 15 SOLUTION RESPIRATORY (INHALATION) at 07:03

## 2020-01-01 RX ADMIN — Medication 40 ML: at 07:37

## 2020-01-01 RX ADMIN — ACETAMINOPHEN 650 MG: 325 TABLET, FILM COATED ORAL at 23:47

## 2020-01-01 RX ADMIN — LEVETIRACETAM 250 MG: 250 TABLET ORAL at 11:41

## 2020-01-01 RX ADMIN — DIGOXIN 0.06 MG: 125 TABLET ORAL at 12:15

## 2020-01-01 RX ADMIN — MIDODRINE HYDROCHLORIDE 5 MG: 5 TABLET ORAL at 08:25

## 2020-01-01 RX ADMIN — Medication 1 CAPSULE: at 08:25

## 2020-01-01 RX ADMIN — Medication 1 CAPSULE: at 08:13

## 2020-01-01 RX ADMIN — PIPERACILLIN AND TAZOBACTAM 3.38 G: 3; .375 INJECTION, POWDER, LYOPHILIZED, FOR SOLUTION INTRAVENOUS at 20:17

## 2020-01-01 RX ADMIN — CLONAZEPAM 0.25 MG: 1 TABLET ORAL at 17:00

## 2020-01-01 RX ADMIN — FLUDROCORTISONE ACETATE 0.1 MG: 0.1 TABLET ORAL at 09:43

## 2020-01-01 RX ADMIN — MEROPENEM 500 MG: 500 INJECTION, POWDER, FOR SOLUTION INTRAVENOUS at 23:50

## 2020-01-01 RX ADMIN — MEROPENEM 500 MG: 500 INJECTION, POWDER, FOR SOLUTION INTRAVENOUS at 20:07

## 2020-01-01 RX ADMIN — CLONAZEPAM 0.25 MG: 1 TABLET ORAL at 08:28

## 2020-01-01 RX ADMIN — ACETAMINOPHEN 650 MG: 325 TABLET ORAL at 12:41

## 2020-01-01 RX ADMIN — LEVETIRACETAM 250 MG: 250 TABLET ORAL at 08:42

## 2020-01-01 RX ADMIN — LEVETIRACETAM 250 MG: 250 TABLET ORAL at 17:06

## 2020-01-01 RX ADMIN — SILDENAFIL CITRATE 20 MG: 20 TABLET ORAL at 22:13

## 2020-01-01 RX ADMIN — TAMSULOSIN HYDROCHLORIDE 0.4 MG: 0.4 CAPSULE ORAL at 08:55

## 2020-01-01 RX ADMIN — TAMSULOSIN HYDROCHLORIDE 0.4 MG: 0.4 CAPSULE ORAL at 08:58

## 2020-01-01 RX ADMIN — ACETYLCYSTEINE 600 MG: 200 SOLUTION ORAL; RESPIRATORY (INHALATION) at 07:26

## 2020-01-01 RX ADMIN — HYDRALAZINE HYDROCHLORIDE 10 MG: 20 INJECTION INTRAMUSCULAR; INTRAVENOUS at 04:07

## 2020-01-01 RX ADMIN — ALLOPURINOL 100 MG: 100 TABLET ORAL at 09:02

## 2020-01-01 RX ADMIN — HYDRALAZINE HYDROCHLORIDE 10 MG: 10 TABLET, FILM COATED ORAL at 21:18

## 2020-01-01 RX ADMIN — CEFEPIME HYDROCHLORIDE 2 G: 2 INJECTION, POWDER, FOR SOLUTION INTRAVENOUS at 09:34

## 2020-01-01 RX ADMIN — MEROPENEM 500 MG: 500 INJECTION, POWDER, FOR SOLUTION INTRAVENOUS at 16:56

## 2020-01-01 RX ADMIN — PANTOPRAZOLE SODIUM 40 MG: 40 TABLET, DELAYED RELEASE ORAL at 07:00

## 2020-01-01 RX ADMIN — CEFEPIME HYDROCHLORIDE 2 G: 2 INJECTION, POWDER, FOR SOLUTION INTRAVENOUS at 00:15

## 2020-01-01 RX ADMIN — CEFEPIME HYDROCHLORIDE 2 G: 2 INJECTION, POWDER, FOR SOLUTION INTRAVENOUS at 09:07

## 2020-01-01 RX ADMIN — BUDESONIDE 500 MCG: 0.5 INHALANT RESPIRATORY (INHALATION) at 07:07

## 2020-01-01 RX ADMIN — SPIRONOLACTONE 25 MG: 25 TABLET ORAL at 08:42

## 2020-01-01 RX ADMIN — LEVETIRACETAM 250 MG: 250 TABLET ORAL at 08:47

## 2020-01-01 RX ADMIN — MEROPENEM 500 MG: 500 INJECTION, POWDER, FOR SOLUTION INTRAVENOUS at 23:51

## 2020-01-01 RX ADMIN — BUDESONIDE 500 MCG: 0.5 INHALANT RESPIRATORY (INHALATION) at 20:02

## 2020-01-01 RX ADMIN — LEVETIRACETAM 125 MG: 250 TABLET ORAL at 08:59

## 2020-01-01 RX ADMIN — Medication 30 ML: at 07:29

## 2020-01-01 RX ADMIN — COLLAGENASE SANTYL: 250 OINTMENT TOPICAL at 12:01

## 2020-01-01 RX ADMIN — HYDRALAZINE HYDROCHLORIDE 5 MG: 20 INJECTION INTRAMUSCULAR; INTRAVENOUS at 00:20

## 2020-01-01 RX ADMIN — Medication 10 ML: at 21:45

## 2020-01-01 RX ADMIN — LEVETIRACETAM 250 MG: 250 TABLET ORAL at 18:53

## 2020-01-01 RX ADMIN — SUCRALFATE 1 G: 1 TABLET ORAL at 21:54

## 2020-01-01 RX ADMIN — ALBUMIN (HUMAN) 12.5 G: 0.25 INJECTION, SOLUTION INTRAVENOUS at 10:09

## 2020-01-01 RX ADMIN — CLONAZEPAM 0.5 MG: 0.5 TABLET ORAL at 02:00

## 2020-01-01 RX ADMIN — LEVETIRACETAM 250 MG: 250 TABLET ORAL at 09:25

## 2020-01-01 RX ADMIN — MAGNESIUM OXIDE TAB 400 MG (241.3 MG ELEMENTAL MG) 800 MG: 400 (241.3 MG) TAB at 18:53

## 2020-01-01 RX ADMIN — LEVETIRACETAM 250 MG: 250 TABLET ORAL at 08:40

## 2020-01-01 RX ADMIN — LEVETIRACETAM 250 MG: 250 TABLET ORAL at 17:25

## 2020-01-01 RX ADMIN — EPOETIN ALFA-EPBX 20000 UNITS: 10000 INJECTION, SOLUTION INTRAVENOUS; SUBCUTANEOUS at 22:29

## 2020-01-01 RX ADMIN — MAGNESIUM OXIDE TAB 400 MG (241.3 MG ELEMENTAL MG) 400 MG: 400 (241.3 MG) TAB at 08:48

## 2020-01-01 RX ADMIN — OCTREOTIDE ACETATE 25 MCG: 50 INJECTION, SOLUTION INTRAVENOUS; SUBCUTANEOUS at 09:59

## 2020-01-01 RX ADMIN — HYDRALAZINE HYDROCHLORIDE 20 MG: 20 INJECTION INTRAMUSCULAR; INTRAVENOUS at 12:13

## 2020-01-01 RX ADMIN — SUCRALFATE 1 G: 1 TABLET ORAL at 07:16

## 2020-01-01 RX ADMIN — POTASSIUM CHLORIDE 10 MEQ: 750 TABLET, FILM COATED, EXTENDED RELEASE ORAL at 08:33

## 2020-01-01 RX ADMIN — LEVETIRACETAM 250 MG: 250 TABLET ORAL at 17:16

## 2020-01-01 RX ADMIN — FINASTERIDE 5 MG: 5 TABLET, FILM COATED ORAL at 22:15

## 2020-01-01 RX ADMIN — MAGNESIUM SULFATE HEPTAHYDRATE 1 G: 1 INJECTION, SOLUTION INTRAVENOUS at 11:49

## 2020-01-01 RX ADMIN — GENTAMICIN SULFATE: 1 CREAM TOPICAL at 17:07

## 2020-01-01 RX ADMIN — CEFEPIME HYDROCHLORIDE 2 G: 2 INJECTION, POWDER, FOR SOLUTION INTRAVENOUS at 08:58

## 2020-01-01 RX ADMIN — SALINE NASAL SPRAY 2 SPRAY: 1.5 SOLUTION NASAL at 22:00

## 2020-01-01 RX ADMIN — VENLAFAXINE HYDROCHLORIDE 150 MG: 150 CAPSULE, EXTENDED RELEASE ORAL at 09:09

## 2020-01-01 RX ADMIN — SODIUM CHLORIDE 300 MG: 900 INJECTION, SOLUTION INTRAVENOUS at 17:11

## 2020-01-01 RX ADMIN — POTASSIUM CHLORIDE 40 MEQ: 750 TABLET, FILM COATED, EXTENDED RELEASE ORAL at 08:42

## 2020-01-01 RX ADMIN — HYDRALAZINE HYDROCHLORIDE 10 MG: 10 TABLET, FILM COATED ORAL at 17:07

## 2020-01-01 RX ADMIN — SUCRALFATE 1 G: 1 TABLET ORAL at 11:23

## 2020-01-01 RX ADMIN — MIDODRINE HYDROCHLORIDE 5 MG: 5 TABLET ORAL at 12:02

## 2020-01-01 RX ADMIN — MAGNESIUM OXIDE TAB 400 MG (241.3 MG ELEMENTAL MG) 800 MG: 400 (241.3 MG) TAB at 17:01

## 2020-01-01 RX ADMIN — SUCRALFATE 1 G: 1 TABLET ORAL at 07:28

## 2020-01-01 RX ADMIN — Medication 1 G: at 08:42

## 2020-01-01 RX ADMIN — MIDODRINE HYDROCHLORIDE 5 MG: 5 TABLET ORAL at 11:04

## 2020-01-01 RX ADMIN — SUCRALFATE 1 G: 1 TABLET ORAL at 22:22

## 2020-01-01 RX ADMIN — VENLAFAXINE HYDROCHLORIDE 75 MG: 37.5 CAPSULE, EXTENDED RELEASE ORAL at 08:12

## 2020-01-01 RX ADMIN — MIDODRINE HYDROCHLORIDE 5 MG: 5 TABLET ORAL at 17:01

## 2020-01-01 RX ADMIN — CALCIUM POLYCARBOPHIL 625 MG: 625 TABLET, FILM COATED ORAL at 09:42

## 2020-01-01 RX ADMIN — Medication 1 G: at 08:01

## 2020-01-01 RX ADMIN — HYDROMORPHONE HYDROCHLORIDE 1 MG: 1 INJECTION, SOLUTION INTRAMUSCULAR; INTRAVENOUS; SUBCUTANEOUS at 15:18

## 2020-01-01 RX ADMIN — MEROPENEM 500 MG: 500 INJECTION, POWDER, FOR SOLUTION INTRAVENOUS at 06:39

## 2020-01-01 RX ADMIN — Medication 10 ML: at 15:19

## 2020-01-01 RX ADMIN — WARFARIN SODIUM 1 MG: 1 TABLET ORAL at 16:41

## 2020-01-01 RX ADMIN — DIGOXIN 0.12 MG: 125 TABLET ORAL at 09:44

## 2020-01-01 RX ADMIN — OCTREOTIDE ACETATE 25 MCG: 50 INJECTION, SOLUTION INTRAVENOUS; SUBCUTANEOUS at 21:13

## 2020-01-01 RX ADMIN — FINASTERIDE 5 MG: 5 TABLET, FILM COATED ORAL at 08:32

## 2020-01-01 RX ADMIN — CLONAZEPAM 0.5 MG: 0.5 TABLET ORAL at 16:44

## 2020-01-01 RX ADMIN — MEROPENEM 500 MG: 500 INJECTION, POWDER, FOR SOLUTION INTRAVENOUS at 23:30

## 2020-01-01 RX ADMIN — HYDRALAZINE HYDROCHLORIDE 20 MG: 20 INJECTION INTRAMUSCULAR; INTRAVENOUS at 00:37

## 2020-01-01 RX ADMIN — TAMSULOSIN HYDROCHLORIDE 0.4 MG: 0.4 CAPSULE ORAL at 08:57

## 2020-01-01 RX ADMIN — ATORVASTATIN CALCIUM 40 MG: 20 TABLET, FILM COATED ORAL at 08:40

## 2020-01-01 RX ADMIN — TAMSULOSIN HYDROCHLORIDE 0.4 MG: 0.4 CAPSULE ORAL at 09:00

## 2020-01-01 RX ADMIN — ACETYLCYSTEINE 600 MG: 200 SOLUTION ORAL; RESPIRATORY (INHALATION) at 20:02

## 2020-01-01 RX ADMIN — MAGNESIUM OXIDE TAB 400 MG (241.3 MG ELEMENTAL MG) 800 MG: 400 (241.3 MG) TAB at 17:09

## 2020-01-01 RX ADMIN — MELATONIN 2 TABLET: at 09:22

## 2020-01-01 RX ADMIN — LEVETIRACETAM 250 MG: 250 TABLET ORAL at 17:09

## 2020-01-01 RX ADMIN — MAGNESIUM OXIDE TAB 400 MG (241.3 MG ELEMENTAL MG) 800 MG: 400 (241.3 MG) TAB at 09:33

## 2020-01-01 RX ADMIN — Medication 1 CAPSULE: at 11:47

## 2020-01-01 RX ADMIN — LEVOFLOXACIN 750 MG: 5 INJECTION, SOLUTION INTRAVENOUS at 16:13

## 2020-01-01 RX ADMIN — HYDROCORTISONE: 1 CREAM TOPICAL at 20:28

## 2020-01-01 RX ADMIN — FINASTERIDE 5 MG: 5 TABLET, FILM COATED ORAL at 08:27

## 2020-01-01 RX ADMIN — THERA TABS 1 TABLET: TAB at 08:33

## 2020-01-01 RX ADMIN — BUDESONIDE 500 MCG: 0.5 INHALANT RESPIRATORY (INHALATION) at 10:15

## 2020-01-01 RX ADMIN — SENNOSIDES AND DOCUSATE SODIUM 1 TABLET: 8.6; 5 TABLET ORAL at 13:05

## 2020-01-01 RX ADMIN — VENLAFAXINE HYDROCHLORIDE 150 MG: 150 CAPSULE, EXTENDED RELEASE ORAL at 08:25

## 2020-01-01 RX ADMIN — Medication 1 G: at 18:01

## 2020-01-01 RX ADMIN — SUCRALFATE 1 G: 1 TABLET ORAL at 17:19

## 2020-01-01 RX ADMIN — MEROPENEM 500 MG: 500 INJECTION, POWDER, FOR SOLUTION INTRAVENOUS at 14:13

## 2020-01-01 RX ADMIN — LEVETIRACETAM 125 MG: 250 TABLET ORAL at 09:00

## 2020-01-01 RX ADMIN — ACETYLCYSTEINE 600 MG: 200 SOLUTION ORAL; RESPIRATORY (INHALATION) at 21:06

## 2020-01-01 RX ADMIN — MEROPENEM 500 MG: 500 INJECTION, POWDER, FOR SOLUTION INTRAVENOUS at 13:34

## 2020-01-01 RX ADMIN — LEVETIRACETAM 250 MG: 250 TABLET ORAL at 09:23

## 2020-01-01 RX ADMIN — POTASSIUM CHLORIDE 20 MEQ: 750 TABLET, FILM COATED, EXTENDED RELEASE ORAL at 08:59

## 2020-01-01 RX ADMIN — Medication 100 MG: at 11:47

## 2020-01-01 RX ADMIN — ALBUMIN (HUMAN) 12.5 G: 0.25 INJECTION, SOLUTION INTRAVENOUS at 11:01

## 2020-01-01 RX ADMIN — SILDENAFIL CITRATE 20 MG: 20 TABLET ORAL at 17:08

## 2020-01-01 RX ADMIN — EPOETIN ALFA-EPBX 20000 UNITS: 10000 INJECTION, SOLUTION INTRAVENOUS; SUBCUTANEOUS at 21:27

## 2020-01-01 RX ADMIN — SILDENAFIL CITRATE 10 MG: 20 TABLET ORAL at 17:04

## 2020-01-01 RX ADMIN — DOCUSATE SODIUM 100 MG: 100 CAPSULE, LIQUID FILLED ORAL at 07:59

## 2020-01-01 RX ADMIN — LEVOFLOXACIN 750 MG: 5 INJECTION, SOLUTION INTRAVENOUS at 14:32

## 2020-01-01 RX ADMIN — FLUDROCORTISONE ACETATE 0.1 MG: 0.1 TABLET ORAL at 09:05

## 2020-01-01 RX ADMIN — WARFARIN SODIUM 4 MG: 1 TABLET ORAL at 17:21

## 2020-01-01 RX ADMIN — ALBUMIN (HUMAN) 25 G: 0.25 INJECTION, SOLUTION INTRAVENOUS at 11:42

## 2020-01-01 RX ADMIN — VENLAFAXINE HYDROCHLORIDE 150 MG: 150 CAPSULE, EXTENDED RELEASE ORAL at 09:43

## 2020-01-01 RX ADMIN — ARFORMOTEROL TARTRATE 15 MCG: 15 SOLUTION RESPIRATORY (INHALATION) at 10:43

## 2020-01-01 RX ADMIN — ARFORMOTEROL TARTRATE 15 MCG: 15 SOLUTION RESPIRATORY (INHALATION) at 07:18

## 2020-01-01 RX ADMIN — SUCRALFATE 1 G: 1 TABLET ORAL at 12:20

## 2020-01-01 RX ADMIN — MAGNESIUM OXIDE TAB 400 MG (241.3 MG ELEMENTAL MG) 800 MG: 400 (241.3 MG) TAB at 17:21

## 2020-01-01 RX ADMIN — Medication 100 MG: at 09:44

## 2020-01-01 RX ADMIN — SUCRALFATE 1 G: 1 TABLET ORAL at 17:21

## 2020-01-01 RX ADMIN — TAMSULOSIN HYDROCHLORIDE 0.4 MG: 0.4 CAPSULE ORAL at 08:30

## 2020-01-01 RX ADMIN — ALBUMIN (HUMAN) 12.5 G: 12.5 INJECTION, SOLUTION INTRAVENOUS at 09:17

## 2020-01-01 RX ADMIN — OCTREOTIDE ACETATE 25 MCG: 50 INJECTION, SOLUTION INTRAVENOUS; SUBCUTANEOUS at 08:24

## 2020-01-01 RX ADMIN — Medication 1 CAPSULE: at 09:47

## 2020-01-01 RX ADMIN — CEFEPIME HYDROCHLORIDE 2 G: 2 INJECTION, POWDER, FOR SOLUTION INTRAVENOUS at 08:33

## 2020-01-01 RX ADMIN — Medication 100 MG: at 09:32

## 2020-01-01 RX ADMIN — BUDESONIDE 500 MCG: 0.5 INHALANT RESPIRATORY (INHALATION) at 20:03

## 2020-01-01 RX ADMIN — Medication 100 MG: at 09:53

## 2020-01-01 RX ADMIN — ARFORMOTEROL TARTRATE 15 MCG: 15 SOLUTION RESPIRATORY (INHALATION) at 21:02

## 2020-01-01 RX ADMIN — DIPHENHYDRAMINE HYDROCHLORIDE 25 MG: 25 CAPSULE ORAL at 22:13

## 2020-01-01 RX ADMIN — Medication 1 CAPSULE: at 08:55

## 2020-01-01 RX ADMIN — TAMSULOSIN HYDROCHLORIDE 0.8 MG: 0.4 CAPSULE ORAL at 21:45

## 2020-01-01 RX ADMIN — LORAZEPAM 1 MG: 2 INJECTION INTRAMUSCULAR; INTRAVENOUS at 04:28

## 2020-01-01 RX ADMIN — CALCIUM POLYCARBOPHIL 625 MG: 625 TABLET, FILM COATED ORAL at 09:53

## 2020-01-01 RX ADMIN — Medication 10 ML: at 12:04

## 2020-01-01 RX ADMIN — ALBUMIN (HUMAN) 25 G: 0.25 INJECTION, SOLUTION INTRAVENOUS at 15:25

## 2020-01-01 RX ADMIN — CEFEPIME HYDROCHLORIDE 2 G: 2 INJECTION, POWDER, FOR SOLUTION INTRAVENOUS at 15:07

## 2020-01-01 RX ADMIN — FINASTERIDE 5 MG: 5 TABLET, FILM COATED ORAL at 09:34

## 2020-01-01 RX ADMIN — BUDESONIDE 500 MCG: 0.5 INHALANT RESPIRATORY (INHALATION) at 23:13

## 2020-01-01 RX ADMIN — Medication 100 MG: at 09:12

## 2020-01-01 RX ADMIN — POTASSIUM CHLORIDE 20 MEQ: 750 TABLET, FILM COATED, EXTENDED RELEASE ORAL at 17:06

## 2020-01-01 RX ADMIN — LEVETIRACETAM 250 MG: 250 TABLET ORAL at 17:03

## 2020-01-01 RX ADMIN — MAGNESIUM OXIDE TAB 400 MG (241.3 MG ELEMENTAL MG) 800 MG: 400 (241.3 MG) TAB at 18:00

## 2020-01-01 RX ADMIN — WARFARIN SODIUM 5 MG: 5 TABLET ORAL at 17:05

## 2020-01-01 RX ADMIN — MEROPENEM 500 MG: 500 INJECTION, POWDER, FOR SOLUTION INTRAVENOUS at 19:01

## 2020-01-01 RX ADMIN — ACETYLCYSTEINE 1200 MG: 200 SOLUTION ORAL; RESPIRATORY (INHALATION) at 10:19

## 2020-01-01 RX ADMIN — CLONAZEPAM 0.25 MG: 0.5 TABLET ORAL at 22:53

## 2020-01-01 RX ADMIN — MIDODRINE HYDROCHLORIDE 5 MG: 5 TABLET ORAL at 09:56

## 2020-01-01 RX ADMIN — SILDENAFIL CITRATE 20 MG: 20 TABLET ORAL at 17:18

## 2020-01-01 RX ADMIN — SUCRALFATE 1 G: 1 TABLET ORAL at 22:09

## 2020-01-01 RX ADMIN — WATER 1 MG: 1 INJECTION INTRAMUSCULAR; INTRAVENOUS; SUBCUTANEOUS at 11:55

## 2020-01-01 RX ADMIN — POTASSIUM CHLORIDE 10 MEQ: 750 TABLET, FILM COATED, EXTENDED RELEASE ORAL at 09:53

## 2020-01-01 RX ADMIN — IPRATROPIUM BROMIDE AND ALBUTEROL SULFATE 3 ML: .5; 3 SOLUTION RESPIRATORY (INHALATION) at 15:52

## 2020-01-01 RX ADMIN — CIPROFLOXACIN 750 MG: 500 TABLET, FILM COATED ORAL at 02:37

## 2020-01-01 RX ADMIN — THERA TABS 1 TABLET: TAB at 08:27

## 2020-01-01 RX ADMIN — DIGOXIN 0.06 MG: 125 TABLET ORAL at 21:54

## 2020-01-01 RX ADMIN — SUCRALFATE 1 G: 1 TABLET ORAL at 11:47

## 2020-01-01 RX ADMIN — ACETYLCYSTEINE 600 MG: 200 SOLUTION ORAL; RESPIRATORY (INHALATION) at 07:10

## 2020-01-01 RX ADMIN — THERA TABS 1 TABLET: TAB at 09:01

## 2020-01-01 RX ADMIN — FLUDROCORTISONE ACETATE 0.1 MG: 0.1 TABLET ORAL at 08:39

## 2020-01-01 RX ADMIN — MIDODRINE HYDROCHLORIDE 2.5 MG: 5 TABLET ORAL at 17:47

## 2020-01-01 RX ADMIN — MEROPENEM 500 MG: 500 INJECTION, POWDER, FOR SOLUTION INTRAVENOUS at 05:43

## 2020-01-01 RX ADMIN — BUDESONIDE 500 MCG: 0.5 INHALANT RESPIRATORY (INHALATION) at 21:02

## 2020-01-01 RX ADMIN — OCTREOTIDE ACETATE 25 MCG: 50 INJECTION, SOLUTION INTRAVENOUS; SUBCUTANEOUS at 10:24

## 2020-01-01 RX ADMIN — Medication 10 ML: at 06:51

## 2020-01-01 RX ADMIN — HYDROCORTISONE: 1 CREAM TOPICAL at 13:17

## 2020-01-01 RX ADMIN — GLYCOPYRROLATE 0.2 MG: 0.2 INJECTION, SOLUTION INTRAMUSCULAR; INTRAVENOUS at 07:24

## 2020-01-01 RX ADMIN — Medication 10 ML: at 21:17

## 2020-01-01 RX ADMIN — OCTREOTIDE ACETATE 25 MCG: 50 INJECTION, SOLUTION INTRAVENOUS; SUBCUTANEOUS at 22:58

## 2020-01-01 RX ADMIN — ALPRAZOLAM 0.25 MG: 0.25 TABLET ORAL at 08:48

## 2020-01-01 RX ADMIN — ALBUMIN (HUMAN) 12.5 G: 12.5 INJECTION, SOLUTION INTRAVENOUS at 20:54

## 2020-01-01 RX ADMIN — SALINE NASAL SPRAY 2 SPRAY: 1.5 SOLUTION NASAL at 21:14

## 2020-01-01 RX ADMIN — OCTREOTIDE ACETATE 25 MCG: 50 INJECTION, SOLUTION INTRAVENOUS; SUBCUTANEOUS at 15:08

## 2020-01-01 RX ADMIN — GLYCOPYRROLATE 0.2 MG: 0.2 INJECTION, SOLUTION INTRAMUSCULAR; INTRAVENOUS at 23:52

## 2020-01-01 RX ADMIN — Medication 20 ML: at 14:05

## 2020-01-01 RX ADMIN — VENLAFAXINE HYDROCHLORIDE 150 MG: 150 CAPSULE, EXTENDED RELEASE ORAL at 09:56

## 2020-01-01 RX ADMIN — SUCRALFATE 1 G: 1 TABLET ORAL at 17:04

## 2020-01-01 RX ADMIN — CEFEPIME HYDROCHLORIDE 2 G: 2 INJECTION, POWDER, FOR SOLUTION INTRAVENOUS at 08:50

## 2020-01-01 RX ADMIN — WARFARIN SODIUM 1 MG: 1 TABLET ORAL at 16:40

## 2020-01-01 RX ADMIN — Medication 1 CAPSULE: at 08:24

## 2020-01-01 RX ADMIN — MEROPENEM 500 MG: 500 INJECTION, POWDER, FOR SOLUTION INTRAVENOUS at 09:03

## 2020-01-01 RX ADMIN — FLUTICASONE PROPIONATE 2 SPRAY: 50 SPRAY, METERED NASAL at 14:03

## 2020-01-01 RX ADMIN — ARFORMOTEROL TARTRATE 15 MCG: 15 SOLUTION RESPIRATORY (INHALATION) at 07:25

## 2020-01-01 RX ADMIN — SILDENAFIL CITRATE 10 MG: 20 TABLET ORAL at 22:13

## 2020-01-01 RX ADMIN — Medication 1 CAPSULE: at 09:09

## 2020-01-01 RX ADMIN — FINASTERIDE 5 MG: 5 TABLET, FILM COATED ORAL at 11:47

## 2020-01-01 RX ADMIN — LEVOFLOXACIN 750 MG: 5 INJECTION, SOLUTION INTRAVENOUS at 17:21

## 2020-01-01 RX ADMIN — VENLAFAXINE HYDROCHLORIDE 75 MG: 37.5 CAPSULE, EXTENDED RELEASE ORAL at 13:38

## 2020-01-01 RX ADMIN — OXYMETAZOLINE HYDROCHLORIDE 2 SPRAY: 0.05 SPRAY NASAL at 21:12

## 2020-01-01 RX ADMIN — FLUDROCORTISONE ACETATE 0.1 MG: 0.1 TABLET ORAL at 08:54

## 2020-01-01 RX ADMIN — SILDENAFIL CITRATE 20 MG: 20 TABLET ORAL at 21:41

## 2020-01-01 RX ADMIN — VENLAFAXINE HYDROCHLORIDE 150 MG: 150 CAPSULE, EXTENDED RELEASE ORAL at 10:33

## 2020-01-01 RX ADMIN — LORAZEPAM 1 MG: 2 INJECTION INTRAMUSCULAR; INTRAVENOUS at 23:45

## 2020-01-01 RX ADMIN — ACETAMINOPHEN 650 MG: 325 TABLET, FILM COATED ORAL at 15:12

## 2020-01-01 RX ADMIN — VENLAFAXINE HYDROCHLORIDE 150 MG: 150 CAPSULE, EXTENDED RELEASE ORAL at 09:18

## 2020-01-01 RX ADMIN — OCTREOTIDE ACETATE 25 MCG: 50 INJECTION, SOLUTION INTRAVENOUS; SUBCUTANEOUS at 21:35

## 2020-01-01 RX ADMIN — Medication 10 ML: at 07:26

## 2020-01-01 RX ADMIN — ASPIRIN 81 MG: 81 TABLET, COATED ORAL at 08:33

## 2020-01-01 RX ADMIN — SUCRALFATE 1 G: 1 TABLET ORAL at 17:05

## 2020-01-01 RX ADMIN — CLONAZEPAM 0.5 MG: 0.5 TABLET ORAL at 21:41

## 2020-01-01 RX ADMIN — MAGNESIUM OXIDE TAB 400 MG (241.3 MG ELEMENTAL MG) 800 MG: 400 (241.3 MG) TAB at 17:03

## 2020-01-01 RX ADMIN — COLLAGENASE SANTYL: 250 OINTMENT TOPICAL at 10:54

## 2020-01-01 RX ADMIN — SILDENAFIL CITRATE 20 MG: 20 TABLET ORAL at 17:45

## 2020-01-01 RX ADMIN — SUCRALFATE 1 G: 1 TABLET ORAL at 11:25

## 2020-01-01 RX ADMIN — Medication 100 MG: at 08:12

## 2020-01-01 RX ADMIN — CALCIUM POLYCARBOPHIL 625 MG: 625 TABLET, FILM COATED ORAL at 09:09

## 2020-01-01 RX ADMIN — BUMETANIDE 1 MG: 0.25 INJECTION INTRAMUSCULAR; INTRAVENOUS at 11:24

## 2020-01-01 RX ADMIN — ALBUMIN (HUMAN) 12.5 G: 12.5 INJECTION, SOLUTION INTRAVENOUS at 10:32

## 2020-01-01 RX ADMIN — OCTREOTIDE ACETATE 25 MCG: 50 INJECTION, SOLUTION INTRAVENOUS; SUBCUTANEOUS at 21:56

## 2020-01-01 RX ADMIN — SUCRALFATE 1 G: 1 TABLET ORAL at 16:40

## 2020-01-01 RX ADMIN — ONDANSETRON 4 MG: 2 INJECTION INTRAMUSCULAR; INTRAVENOUS at 11:34

## 2020-01-01 RX ADMIN — DIGOXIN 0.06 MG: 125 TABLET ORAL at 23:17

## 2020-01-01 RX ADMIN — TAMSULOSIN HYDROCHLORIDE 0.8 MG: 0.4 CAPSULE ORAL at 22:01

## 2020-01-01 RX ADMIN — ROCURONIUM BROMIDE 10 MG: 10 SOLUTION INTRAVENOUS at 14:07

## 2020-01-01 RX ADMIN — Medication 10 ML: at 21:10

## 2020-01-01 RX ADMIN — MELATONIN 2 TABLET: at 08:00

## 2020-01-01 RX ADMIN — OLANZAPINE 5 MG: 5 TABLET, FILM COATED ORAL at 17:42

## 2020-01-01 RX ADMIN — PANTOPRAZOLE SODIUM 40 MG: 40 TABLET, DELAYED RELEASE ORAL at 07:16

## 2020-01-01 RX ADMIN — SALINE NASAL SPRAY 2 SPRAY: 1.5 SOLUTION NASAL at 00:03

## 2020-01-01 RX ADMIN — MEROPENEM 500 MG: 500 INJECTION, POWDER, FOR SOLUTION INTRAVENOUS at 17:01

## 2020-01-01 RX ADMIN — MEROPENEM 500 MG: 500 INJECTION, POWDER, FOR SOLUTION INTRAVENOUS at 17:33

## 2020-01-01 RX ADMIN — MAGNESIUM OXIDE TAB 400 MG (241.3 MG ELEMENTAL MG) 800 MG: 400 (241.3 MG) TAB at 08:20

## 2020-01-01 RX ADMIN — LEVETIRACETAM 250 MG: 250 TABLET ORAL at 08:59

## 2020-01-01 RX ADMIN — SUCRALFATE 1 G: 1 TABLET ORAL at 16:27

## 2020-01-01 RX ADMIN — CEFEPIME HYDROCHLORIDE 2 G: 2 INJECTION, POWDER, FOR SOLUTION INTRAVENOUS at 16:40

## 2020-01-01 RX ADMIN — OCTREOTIDE ACETATE 25 MCG: 50 INJECTION, SOLUTION INTRAVENOUS; SUBCUTANEOUS at 23:37

## 2020-01-01 RX ADMIN — DIGOXIN 0.12 MG: 125 TABLET ORAL at 11:41

## 2020-01-01 RX ADMIN — CEFEPIME HYDROCHLORIDE 2 G: 2 INJECTION, POWDER, FOR SOLUTION INTRAVENOUS at 07:00

## 2020-01-01 RX ADMIN — ALBUMIN (HUMAN) 12.5 G: 0.25 INJECTION, SOLUTION INTRAVENOUS at 17:09

## 2020-01-01 RX ADMIN — CIPROFLOXACIN 750 MG: 500 TABLET, FILM COATED ORAL at 10:53

## 2020-01-01 RX ADMIN — SALINE NASAL SPRAY 2 SPRAY: 1.5 SOLUTION NASAL at 15:32

## 2020-01-01 RX ADMIN — FENTANYL CITRATE 25 MCG: 50 INJECTION, SOLUTION INTRAMUSCULAR; INTRAVENOUS at 14:31

## 2020-01-01 RX ADMIN — PANTOPRAZOLE SODIUM 40 MG: 40 TABLET, DELAYED RELEASE ORAL at 07:20

## 2020-01-01 RX ADMIN — MAGNESIUM OXIDE TAB 400 MG (241.3 MG ELEMENTAL MG) 800 MG: 400 (241.3 MG) TAB at 09:55

## 2020-01-01 RX ADMIN — MEROPENEM 500 MG: 500 INJECTION, POWDER, FOR SOLUTION INTRAVENOUS at 05:17

## 2020-01-01 RX ADMIN — Medication 10 ML: at 21:01

## 2020-01-01 RX ADMIN — OLANZAPINE 2.5 MG: 2.5 TABLET, FILM COATED ORAL at 17:33

## 2020-01-01 RX ADMIN — WARFARIN SODIUM 1 MG: 1 TABLET ORAL at 17:09

## 2020-01-01 RX ADMIN — HYDRALAZINE HYDROCHLORIDE 10 MG: 10 TABLET, FILM COATED ORAL at 14:10

## 2020-01-01 RX ADMIN — MILRINONE LACTATE IN DEXTROSE 0.12 MCG/KG/MIN: 200 INJECTION, SOLUTION INTRAVENOUS at 01:21

## 2020-01-01 RX ADMIN — SILVER NITRATE APPLICATORS 1 APPLICATOR: 25; 75 STICK TOPICAL at 13:46

## 2020-01-01 RX ADMIN — OCTREOTIDE ACETATE 25 MCG: 50 INJECTION, SOLUTION INTRAVENOUS; SUBCUTANEOUS at 10:04

## 2020-01-01 RX ADMIN — MIDODRINE HYDROCHLORIDE 5 MG: 5 TABLET ORAL at 12:15

## 2020-01-01 RX ADMIN — GLYCOPYRROLATE 0.2 MG: 0.2 INJECTION, SOLUTION INTRAMUSCULAR; INTRAVENOUS at 04:35

## 2020-01-01 RX ADMIN — MELATONIN 2 TABLET: at 08:57

## 2020-01-01 RX ADMIN — IPRATROPIUM BROMIDE AND ALBUTEROL SULFATE 3 ML: .5; 3 SOLUTION RESPIRATORY (INHALATION) at 07:25

## 2020-01-01 RX ADMIN — MELATONIN 2 TABLET: at 08:33

## 2020-01-01 RX ADMIN — FLUDROCORTISONE ACETATE 0.1 MG: 0.1 TABLET ORAL at 09:34

## 2020-01-01 RX ADMIN — ESCITALOPRAM OXALATE 10 MG: 10 TABLET ORAL at 17:21

## 2020-01-01 RX ADMIN — Medication 1 G: at 17:00

## 2020-01-01 RX ADMIN — MEROPENEM 500 MG: 500 INJECTION, POWDER, FOR SOLUTION INTRAVENOUS at 14:45

## 2020-01-01 RX ADMIN — MEROPENEM 500 MG: 500 INJECTION, POWDER, FOR SOLUTION INTRAVENOUS at 09:48

## 2020-01-01 RX ADMIN — MIDODRINE HYDROCHLORIDE 5 MG: 5 TABLET ORAL at 13:47

## 2020-01-01 RX ADMIN — CEFEPIME HYDROCHLORIDE 2 G: 2 INJECTION, POWDER, FOR SOLUTION INTRAVENOUS at 15:40

## 2020-01-01 RX ADMIN — MEROPENEM 500 MG: 500 INJECTION, POWDER, FOR SOLUTION INTRAVENOUS at 20:11

## 2020-01-01 RX ADMIN — SACUBITRIL AND VALSARTAN 1 TABLET: 24; 26 TABLET, FILM COATED ORAL at 17:00

## 2020-01-01 RX ADMIN — OCTREOTIDE ACETATE 25 MCG: 50 INJECTION, SOLUTION INTRAVENOUS; SUBCUTANEOUS at 17:57

## 2020-01-01 RX ADMIN — AMOXICILLIN AND CLAVULANATE POTASSIUM 1 TABLET: 875; 125 TABLET, FILM COATED ORAL at 09:21

## 2020-01-01 RX ADMIN — TAMSULOSIN HYDROCHLORIDE 0.4 MG: 0.4 CAPSULE ORAL at 08:20

## 2020-01-01 RX ADMIN — SUCRALFATE 1 G: 1 TABLET ORAL at 07:35

## 2020-01-01 RX ADMIN — Medication 10 ML: at 21:34

## 2020-01-01 RX ADMIN — ARFORMOTEROL TARTRATE 15 MCG: 15 SOLUTION RESPIRATORY (INHALATION) at 08:24

## 2020-01-01 RX ADMIN — ALBUMIN (HUMAN) 12.5 G: 12.5 INJECTION, SOLUTION INTRAVENOUS at 08:36

## 2020-01-01 RX ADMIN — LEVETIRACETAM 125 MG: 250 TABLET ORAL at 17:03

## 2020-01-01 RX ADMIN — CEFEPIME HYDROCHLORIDE 2 G: 2 INJECTION, POWDER, FOR SOLUTION INTRAVENOUS at 00:31

## 2020-01-01 RX ADMIN — OCTREOTIDE ACETATE 25 MCG: 50 INJECTION, SOLUTION INTRAVENOUS; SUBCUTANEOUS at 08:26

## 2020-01-01 RX ADMIN — HYDROCORTISONE: 1 CREAM TOPICAL at 22:18

## 2020-01-01 RX ADMIN — SUCRALFATE 1 G: 1 TABLET ORAL at 23:09

## 2020-01-01 RX ADMIN — MAGNESIUM OXIDE TAB 400 MG (241.3 MG ELEMENTAL MG) 400 MG: 400 (241.3 MG) TAB at 18:00

## 2020-01-01 RX ADMIN — PIPERACILLIN AND TAZOBACTAM 3.38 G: 3; .375 INJECTION, POWDER, LYOPHILIZED, FOR SOLUTION INTRAVENOUS at 12:40

## 2020-01-01 RX ADMIN — LEVETIRACETAM 250 MG: 250 TABLET ORAL at 17:36

## 2020-01-01 RX ADMIN — FINASTERIDE 5 MG: 5 TABLET, FILM COATED ORAL at 09:36

## 2020-01-01 RX ADMIN — FINASTERIDE 5 MG: 5 TABLET, FILM COATED ORAL at 10:26

## 2020-01-01 RX ADMIN — TAMSULOSIN HYDROCHLORIDE 0.8 MG: 0.4 CAPSULE ORAL at 22:33

## 2020-01-01 RX ADMIN — BUDESONIDE 500 MCG: 0.5 INHALANT RESPIRATORY (INHALATION) at 20:22

## 2020-01-01 RX ADMIN — DIGOXIN 0.12 MG: 125 TABLET ORAL at 08:58

## 2020-01-01 RX ADMIN — MAGNESIUM OXIDE TAB 400 MG (241.3 MG ELEMENTAL MG) 800 MG: 400 (241.3 MG) TAB at 18:31

## 2020-01-01 RX ADMIN — CALCIUM POLYCARBOPHIL 625 MG: 625 TABLET, FILM COATED ORAL at 09:28

## 2020-01-01 RX ADMIN — LEVETIRACETAM 250 MG: 250 TABLET ORAL at 07:58

## 2020-01-01 RX ADMIN — Medication 10 ML: at 06:44

## 2020-01-01 RX ADMIN — CALCIUM POLYCARBOPHIL 625 MG: 625 TABLET, FILM COATED ORAL at 08:13

## 2020-01-01 RX ADMIN — OCTREOTIDE ACETATE 25 MCG: 50 INJECTION, SOLUTION INTRAVENOUS; SUBCUTANEOUS at 17:36

## 2020-01-01 RX ADMIN — FINASTERIDE 5 MG: 5 TABLET, FILM COATED ORAL at 08:01

## 2020-01-01 RX ADMIN — OCTREOTIDE ACETATE 25 MCG: 50 INJECTION, SOLUTION INTRAVENOUS; SUBCUTANEOUS at 21:41

## 2020-01-01 RX ADMIN — CEFEPIME HYDROCHLORIDE 2 G: 2 INJECTION, POWDER, FOR SOLUTION INTRAVENOUS at 23:41

## 2020-01-01 RX ADMIN — CLONAZEPAM 0.25 MG: 1 TABLET ORAL at 08:41

## 2020-01-01 RX ADMIN — LEVETIRACETAM 250 MG: 250 TABLET ORAL at 08:57

## 2020-01-01 RX ADMIN — PROPOFOL 100 MG: 10 INJECTION, EMULSION INTRAVENOUS at 14:07

## 2020-01-01 RX ADMIN — THERA TABS 1 TABLET: TAB at 09:44

## 2020-01-01 RX ADMIN — Medication 10 ML: at 04:11

## 2020-01-01 RX ADMIN — DIPHENHYDRAMINE HYDROCHLORIDE 25 MG: 25 CAPSULE ORAL at 14:45

## 2020-01-01 RX ADMIN — SODIUM CHLORIDE 75 ML/HR: 900 INJECTION, SOLUTION INTRAVENOUS at 20:00

## 2020-01-01 RX ADMIN — MAGNESIUM OXIDE TAB 400 MG (241.3 MG ELEMENTAL MG) 800 MG: 400 (241.3 MG) TAB at 09:53

## 2020-01-01 RX ADMIN — SUCRALFATE 1 G: 1 TABLET ORAL at 22:19

## 2020-01-01 RX ADMIN — Medication 10 ML: at 21:35

## 2020-01-01 RX ADMIN — LEVETIRACETAM 250 MG: 250 TABLET ORAL at 23:48

## 2020-01-01 RX ADMIN — SACUBITRIL AND VALSARTAN 1 TABLET: 24; 26 TABLET, FILM COATED ORAL at 17:38

## 2020-01-01 RX ADMIN — SUCRALFATE 1 G: 1 TABLET ORAL at 06:34

## 2020-01-01 RX ADMIN — MEROPENEM 500 MG: 500 INJECTION, POWDER, FOR SOLUTION INTRAVENOUS at 18:14

## 2020-01-01 RX ADMIN — FLUDROCORTISONE ACETATE 0.1 MG: 0.1 TABLET ORAL at 11:23

## 2020-01-01 RX ADMIN — HYDRALAZINE HYDROCHLORIDE 10 MG: 10 TABLET, FILM COATED ORAL at 15:19

## 2020-01-01 RX ADMIN — LEVETIRACETAM 250 MG: 250 TABLET ORAL at 09:01

## 2020-01-01 RX ADMIN — OCTREOTIDE ACETATE 25 MCG: 50 INJECTION, SOLUTION INTRAVENOUS; SUBCUTANEOUS at 10:27

## 2020-01-01 RX ADMIN — SUCRALFATE 1 G: 1 TABLET ORAL at 21:52

## 2020-01-01 RX ADMIN — SILDENAFIL CITRATE 10 MG: 20 TABLET ORAL at 09:33

## 2020-01-01 RX ADMIN — MAGNESIUM OXIDE TAB 400 MG (241.3 MG ELEMENTAL MG) 400 MG: 400 (241.3 MG) TAB at 09:52

## 2020-01-01 RX ADMIN — Medication 10 ML: at 06:01

## 2020-01-01 RX ADMIN — TAMSULOSIN HYDROCHLORIDE 0.8 MG: 0.4 CAPSULE ORAL at 21:01

## 2020-01-01 RX ADMIN — WARFARIN SODIUM 4 MG: 4 TABLET ORAL at 17:47

## 2020-01-01 RX ADMIN — Medication 1 CAPSULE: at 08:00

## 2020-01-01 RX ADMIN — HYDRALAZINE HYDROCHLORIDE 10 MG: 20 INJECTION INTRAMUSCULAR; INTRAVENOUS at 20:36

## 2020-01-01 RX ADMIN — AMOXICILLIN AND CLAVULANATE POTASSIUM 1 TABLET: 875; 125 TABLET, FILM COATED ORAL at 17:00

## 2020-01-01 RX ADMIN — LEVETIRACETAM 250 MG: 250 TABLET ORAL at 17:53

## 2020-01-01 RX ADMIN — DOCUSATE SODIUM 100 MG: 100 CAPSULE, LIQUID FILLED ORAL at 17:53

## 2020-01-01 RX ADMIN — FLUDROCORTISONE ACETATE 0.1 MG: 0.1 TABLET ORAL at 17:19

## 2020-01-01 RX ADMIN — MELATONIN 2 TABLET: at 08:27

## 2020-01-01 RX ADMIN — ALBUMIN (HUMAN) 12.5 G: 12.5 INJECTION, SOLUTION INTRAVENOUS at 11:23

## 2020-01-01 RX ADMIN — CEFEPIME HYDROCHLORIDE 2 G: 2 INJECTION, POWDER, FOR SOLUTION INTRAVENOUS at 23:51

## 2020-01-01 RX ADMIN — Medication 10 ML: at 22:50

## 2020-01-01 RX ADMIN — Medication 10 ML: at 07:01

## 2020-01-01 RX ADMIN — MAGNESIUM OXIDE TAB 400 MG (241.3 MG ELEMENTAL MG) 800 MG: 400 (241.3 MG) TAB at 08:24

## 2020-01-01 RX ADMIN — OCTREOTIDE ACETATE 25 MCG: 50 INJECTION, SOLUTION INTRAVENOUS; SUBCUTANEOUS at 22:48

## 2020-01-01 RX ADMIN — ACETYLCYSTEINE 1200 MG: 200 SOLUTION ORAL; RESPIRATORY (INHALATION) at 16:01

## 2020-01-01 RX ADMIN — DIGOXIN 0.12 MG: 125 TABLET ORAL at 08:33

## 2020-01-01 RX ADMIN — Medication 1 CAPSULE: at 08:33

## 2020-01-01 RX ADMIN — FINASTERIDE 5 MG: 5 TABLET, FILM COATED ORAL at 08:36

## 2020-01-01 RX ADMIN — MEROPENEM 500 MG: 500 INJECTION, POWDER, FOR SOLUTION INTRAVENOUS at 08:31

## 2020-01-01 RX ADMIN — CLONAZEPAM 0.5 MG: 0.5 TABLET ORAL at 12:19

## 2020-01-01 RX ADMIN — ALBUMIN (HUMAN) 25 G: 0.25 INJECTION, SOLUTION INTRAVENOUS at 00:16

## 2020-01-01 RX ADMIN — SACUBITRIL AND VALSARTAN 1 TABLET: 24; 26 TABLET, FILM COATED ORAL at 18:53

## 2020-01-01 RX ADMIN — BUDESONIDE 500 MCG: 0.5 INHALANT RESPIRATORY (INHALATION) at 07:46

## 2020-01-01 RX ADMIN — FLUDROCORTISONE ACETATE 0.1 MG: 0.1 TABLET ORAL at 09:53

## 2020-01-01 RX ADMIN — Medication 10 ML: at 23:45

## 2020-01-01 RX ADMIN — SENNOSIDES AND DOCUSATE SODIUM 1 TABLET: 8.6; 5 TABLET ORAL at 09:33

## 2020-01-01 RX ADMIN — Medication 10 ML: at 21:03

## 2020-01-01 RX ADMIN — CLONAZEPAM 0.5 MG: 0.5 TABLET ORAL at 16:13

## 2020-01-01 RX ADMIN — ALLOPURINOL 100 MG: 100 TABLET ORAL at 08:12

## 2020-01-01 RX ADMIN — HYDRALAZINE HYDROCHLORIDE 10 MG: 10 TABLET, FILM COATED ORAL at 23:23

## 2020-01-01 RX ADMIN — MEROPENEM 500 MG: 500 INJECTION, POWDER, FOR SOLUTION INTRAVENOUS at 01:42

## 2020-01-01 RX ADMIN — THERA TABS 1 TABLET: TAB at 09:06

## 2020-01-01 RX ADMIN — HYDROCORTISONE: 1 CREAM TOPICAL at 19:53

## 2020-01-01 RX ADMIN — SUCRALFATE 1 G: 1 TABLET ORAL at 17:09

## 2020-01-01 RX ADMIN — BUMETANIDE 1 MG: 1 TABLET ORAL at 08:33

## 2020-01-01 RX ADMIN — VENLAFAXINE HYDROCHLORIDE 150 MG: 150 CAPSULE, EXTENDED RELEASE ORAL at 08:33

## 2020-01-01 RX ADMIN — MAGNESIUM OXIDE TAB 400 MG (241.3 MG ELEMENTAL MG) 800 MG: 400 (241.3 MG) TAB at 08:01

## 2020-01-01 RX ADMIN — SODIUM CHLORIDE 500 ML: 900 INJECTION, SOLUTION INTRAVENOUS at 09:43

## 2020-01-01 RX ADMIN — ALLOPURINOL 100 MG: 100 TABLET ORAL at 09:44

## 2020-01-01 RX ADMIN — HYDRALAZINE HYDROCHLORIDE 10 MG: 10 TABLET, FILM COATED ORAL at 22:50

## 2020-01-01 RX ADMIN — MEROPENEM 500 MG: 500 INJECTION, POWDER, FOR SOLUTION INTRAVENOUS at 12:03

## 2020-01-01 RX ADMIN — POTASSIUM CHLORIDE 20 MEQ: 750 TABLET, FILM COATED, EXTENDED RELEASE ORAL at 17:12

## 2020-01-01 RX ADMIN — LEVETIRACETAM 250 MG: 250 TABLET ORAL at 09:09

## 2020-01-01 RX ADMIN — Medication 10 ML: at 05:23

## 2020-01-01 RX ADMIN — MAGNESIUM OXIDE TAB 400 MG (241.3 MG ELEMENTAL MG) 800 MG: 400 (241.3 MG) TAB at 17:56

## 2020-01-01 RX ADMIN — IPRATROPIUM BROMIDE AND ALBUTEROL SULFATE 3 ML: .5; 3 SOLUTION RESPIRATORY (INHALATION) at 20:26

## 2020-01-01 RX ADMIN — Medication 100 MG: at 08:54

## 2020-01-01 RX ADMIN — MELATONIN 2 TABLET: at 08:42

## 2020-01-01 RX ADMIN — SODIUM CHLORIDE 75 ML/HR: 900 INJECTION, SOLUTION INTRAVENOUS at 04:17

## 2020-01-01 RX ADMIN — CEFEPIME HYDROCHLORIDE 2 G: 2 INJECTION, POWDER, FOR SOLUTION INTRAVENOUS at 07:18

## 2020-01-01 RX ADMIN — SUCRALFATE 1 G: 1 TABLET ORAL at 13:47

## 2020-01-01 RX ADMIN — Medication 10 ML: at 15:24

## 2020-01-01 RX ADMIN — Medication 10 ML: at 09:48

## 2020-01-01 RX ADMIN — LEVETIRACETAM 250 MG: 250 TABLET ORAL at 17:47

## 2020-01-01 RX ADMIN — MAGNESIUM OXIDE TAB 400 MG (241.3 MG ELEMENTAL MG) 800 MG: 400 (241.3 MG) TAB at 17:12

## 2020-01-01 RX ADMIN — SUCRALFATE 1 G: 1 TABLET ORAL at 23:50

## 2020-01-01 RX ADMIN — THERA TABS 1 TABLET: TAB at 08:41

## 2020-01-01 RX ADMIN — TAMSULOSIN HYDROCHLORIDE 0.8 MG: 0.4 CAPSULE ORAL at 21:12

## 2020-01-01 RX ADMIN — MAGNESIUM OXIDE TAB 400 MG (241.3 MG ELEMENTAL MG) 400 MG: 400 (241.3 MG) TAB at 17:03

## 2020-01-01 RX ADMIN — ARFORMOTEROL TARTRATE 15 MCG: 15 SOLUTION RESPIRATORY (INHALATION) at 07:22

## 2020-01-01 RX ADMIN — ALLOPURINOL 100 MG: 100 TABLET ORAL at 08:32

## 2020-01-01 RX ADMIN — ARFORMOTEROL TARTRATE 15 MCG: 15 SOLUTION RESPIRATORY (INHALATION) at 09:05

## 2020-01-01 RX ADMIN — LEVOFLOXACIN 750 MG: 5 INJECTION, SOLUTION INTRAVENOUS at 14:29

## 2020-01-01 RX ADMIN — BUDESONIDE 500 MCG: 0.5 INHALANT RESPIRATORY (INHALATION) at 08:40

## 2020-01-01 RX ADMIN — MEROPENEM 500 MG: 500 INJECTION, POWDER, FOR SOLUTION INTRAVENOUS at 14:15

## 2020-01-01 RX ADMIN — VENLAFAXINE HYDROCHLORIDE 75 MG: 37.5 CAPSULE, EXTENDED RELEASE ORAL at 08:41

## 2020-01-01 RX ADMIN — EPOETIN ALFA-EPBX 20000 UNITS: 10000 INJECTION, SOLUTION INTRAVENOUS; SUBCUTANEOUS at 21:08

## 2020-01-01 RX ADMIN — CLONAZEPAM 0.25 MG: 0.5 TABLET ORAL at 17:49

## 2020-01-01 RX ADMIN — POTASSIUM CHLORIDE 10 MEQ: 750 TABLET, FILM COATED, EXTENDED RELEASE ORAL at 08:40

## 2020-01-01 RX ADMIN — SUCRALFATE 1 G: 1 TABLET ORAL at 11:01

## 2020-01-01 RX ADMIN — THERA TABS 1 TABLET: TAB at 08:42

## 2020-01-01 RX ADMIN — WARFARIN SODIUM 2 MG: 1 TABLET ORAL at 17:47

## 2020-01-01 RX ADMIN — ALBUMIN (HUMAN) 25 G: 12.5 INJECTION, SOLUTION INTRAVENOUS at 11:34

## 2020-01-01 RX ADMIN — PIPERACILLIN AND TAZOBACTAM 3.38 G: 3; .375 INJECTION, POWDER, LYOPHILIZED, FOR SOLUTION INTRAVENOUS at 04:09

## 2020-01-01 RX ADMIN — CLONAZEPAM 0.25 MG: 0.5 TABLET ORAL at 10:06

## 2020-01-01 RX ADMIN — SUCRALFATE 1 G: 1 TABLET ORAL at 17:01

## 2020-01-01 RX ADMIN — MAGNESIUM OXIDE TAB 400 MG (241.3 MG ELEMENTAL MG) 800 MG: 400 (241.3 MG) TAB at 17:48

## 2020-01-01 RX ADMIN — LEVETIRACETAM 250 MG: 250 TABLET ORAL at 08:26

## 2020-01-01 RX ADMIN — CEFEPIME HYDROCHLORIDE 2 G: 2 INJECTION, POWDER, FOR SOLUTION INTRAVENOUS at 09:06

## 2020-01-01 RX ADMIN — ACETYLCYSTEINE 600 MG: 200 SOLUTION ORAL; RESPIRATORY (INHALATION) at 08:24

## 2020-01-01 RX ADMIN — OCTREOTIDE ACETATE 25 MCG: 50 INJECTION, SOLUTION INTRAVENOUS; SUBCUTANEOUS at 22:19

## 2020-01-01 RX ADMIN — Medication 10 ML: at 06:22

## 2020-01-01 RX ADMIN — MIDODRINE HYDROCHLORIDE 5 MG: 5 TABLET ORAL at 09:51

## 2020-01-01 RX ADMIN — ATORVASTATIN CALCIUM 40 MG: 20 TABLET, FILM COATED ORAL at 09:23

## 2020-01-01 RX ADMIN — PHENYLEPHRINE HYDROCHLORIDE 80 MCG/MIN: 10 INJECTION INTRAVENOUS at 14:07

## 2020-01-01 RX ADMIN — MEROPENEM 500 MG: 500 INJECTION, POWDER, FOR SOLUTION INTRAVENOUS at 15:08

## 2020-01-01 RX ADMIN — MIDODRINE HYDROCHLORIDE 2.5 MG: 5 TABLET ORAL at 16:40

## 2020-01-01 RX ADMIN — CLONAZEPAM 0.25 MG: 0.5 TABLET ORAL at 09:04

## 2020-01-01 RX ADMIN — Medication 10 ML: at 04:02

## 2020-01-01 RX ADMIN — PROPOFOL 30 MG: 10 INJECTION, EMULSION INTRAVENOUS at 14:31

## 2020-01-01 RX ADMIN — Medication 10 ML: at 06:58

## 2020-01-01 RX ADMIN — LEVOFLOXACIN 750 MG: 5 INJECTION, SOLUTION INTRAVENOUS at 17:28

## 2020-01-01 RX ADMIN — MAGNESIUM OXIDE TAB 400 MG (241.3 MG ELEMENTAL MG) 800 MG: 400 (241.3 MG) TAB at 08:43

## 2020-01-01 RX ADMIN — SUCRALFATE 1 G: 1 TABLET ORAL at 17:45

## 2020-01-01 RX ADMIN — MAGNESIUM OXIDE TAB 400 MG (241.3 MG ELEMENTAL MG) 800 MG: 400 (241.3 MG) TAB at 09:43

## 2020-01-01 RX ADMIN — ARFORMOTEROL TARTRATE 15 MCG: 15 SOLUTION RESPIRATORY (INHALATION) at 20:12

## 2020-01-01 RX ADMIN — HYDRALAZINE HYDROCHLORIDE 5 MG: 20 INJECTION INTRAMUSCULAR; INTRAVENOUS at 23:16

## 2020-01-01 RX ADMIN — Medication 10 ML: at 14:10

## 2020-01-01 RX ADMIN — Medication 10 ML: at 05:42

## 2020-01-01 RX ADMIN — Medication 1 CAPSULE: at 08:20

## 2020-01-01 RX ADMIN — CLONAZEPAM 0.25 MG: 1 TABLET ORAL at 18:53

## 2020-01-01 RX ADMIN — MEROPENEM 500 MG: 500 INJECTION, POWDER, FOR SOLUTION INTRAVENOUS at 20:03

## 2020-01-01 RX ADMIN — VENLAFAXINE HYDROCHLORIDE 150 MG: 150 CAPSULE, EXTENDED RELEASE ORAL at 08:31

## 2020-01-01 RX ADMIN — TAMSULOSIN HYDROCHLORIDE 0.4 MG: 0.4 CAPSULE ORAL at 09:55

## 2020-01-01 RX ADMIN — IPRATROPIUM BROMIDE AND ALBUTEROL SULFATE 3 ML: .5; 3 SOLUTION RESPIRATORY (INHALATION) at 21:05

## 2020-01-01 RX ADMIN — FINASTERIDE 5 MG: 5 TABLET, FILM COATED ORAL at 08:57

## 2020-01-01 RX ADMIN — VENLAFAXINE HYDROCHLORIDE 150 MG: 150 CAPSULE, EXTENDED RELEASE ORAL at 08:20

## 2020-01-01 RX ADMIN — BUDESONIDE 500 MCG: 0.5 INHALANT RESPIRATORY (INHALATION) at 07:27

## 2020-01-01 RX ADMIN — FINASTERIDE 5 MG: 5 TABLET, FILM COATED ORAL at 08:47

## 2020-01-01 RX ADMIN — IPRATROPIUM BROMIDE AND ALBUTEROL SULFATE 3 ML: .5; 3 SOLUTION RESPIRATORY (INHALATION) at 01:20

## 2020-01-01 RX ADMIN — MAGNESIUM OXIDE TAB 400 MG (241.3 MG ELEMENTAL MG) 800 MG: 400 (241.3 MG) TAB at 17:33

## 2020-01-01 RX ADMIN — ALBUMIN (HUMAN) 12.5 G: 12.5 INJECTION, SOLUTION INTRAVENOUS at 18:03

## 2020-01-01 RX ADMIN — MELATONIN 2 TABLET: at 08:20

## 2020-01-01 RX ADMIN — Medication 1 CAPSULE: at 09:45

## 2020-01-01 RX ADMIN — BUDESONIDE 500 MCG: 0.5 INHALANT RESPIRATORY (INHALATION) at 07:05

## 2020-01-01 RX ADMIN — Medication 100 MG: at 09:01

## 2020-01-01 RX ADMIN — LEVETIRACETAM 250 MG: 250 TABLET ORAL at 09:15

## 2020-01-01 RX ADMIN — SODIUM CHLORIDE 100 ML: 900 INJECTION, SOLUTION INTRAVENOUS at 12:42

## 2020-01-01 RX ADMIN — PANTOPRAZOLE SODIUM 40 MG: 40 TABLET, DELAYED RELEASE ORAL at 07:26

## 2020-01-01 RX ADMIN — ONDANSETRON 4 MG: 2 INJECTION INTRAMUSCULAR; INTRAVENOUS at 22:40

## 2020-01-01 RX ADMIN — ONDANSETRON HYDROCHLORIDE 4 MG: 2 INJECTION, SOLUTION INTRAMUSCULAR; INTRAVENOUS at 14:10

## 2020-01-01 RX ADMIN — BUDESONIDE 500 MCG: 0.5 INHALANT RESPIRATORY (INHALATION) at 17:01

## 2020-01-01 RX ADMIN — THERA TABS 1 TABLET: TAB at 08:43

## 2020-01-01 RX ADMIN — Medication 10 ML: at 23:18

## 2020-01-01 RX ADMIN — MEROPENEM 500 MG: 500 INJECTION, POWDER, FOR SOLUTION INTRAVENOUS at 13:46

## 2020-01-01 RX ADMIN — SUCRALFATE 1 G: 1 TABLET ORAL at 22:02

## 2020-01-01 RX ADMIN — TAMSULOSIN HYDROCHLORIDE 0.8 MG: 0.4 CAPSULE ORAL at 23:48

## 2020-01-01 RX ADMIN — Medication 1 CAPSULE: at 08:40

## 2020-01-01 RX ADMIN — SILDENAFIL CITRATE 20 MG: 20 TABLET ORAL at 21:03

## 2020-01-01 RX ADMIN — SUCRALFATE 1 G: 1 TABLET ORAL at 22:17

## 2020-01-01 RX ADMIN — CLONAZEPAM 0.5 MG: 0.5 TABLET ORAL at 08:40

## 2020-01-01 RX ADMIN — Medication 100 MG: at 08:39

## 2020-01-01 RX ADMIN — SODIUM BICARBONATE 650 MG: 650 TABLET ORAL at 17:10

## 2020-01-01 RX ADMIN — ACETYLCYSTEINE 600 MG: 200 SOLUTION ORAL; RESPIRATORY (INHALATION) at 19:57

## 2020-01-01 RX ADMIN — LEVETIRACETAM 125 MG: 250 TABLET ORAL at 17:08

## 2020-01-01 RX ADMIN — FINASTERIDE 5 MG: 5 TABLET, FILM COATED ORAL at 08:24

## 2020-01-01 RX ADMIN — OXYBUTYNIN CHLORIDE 5 MG: 5 TABLET ORAL at 12:04

## 2020-01-01 RX ADMIN — BUDESONIDE 500 MCG: 0.5 INHALANT RESPIRATORY (INHALATION) at 00:43

## 2020-01-01 RX ADMIN — POTASSIUM CHLORIDE 40 MEQ: 750 TABLET, FILM COATED, EXTENDED RELEASE ORAL at 09:06

## 2020-01-01 RX ADMIN — THERA TABS 1 TABLET: TAB at 09:04

## 2020-01-01 RX ADMIN — Medication 10 ML: at 21:30

## 2020-01-01 RX ADMIN — SODIUM CHLORIDE 75 ML/HR: 900 INJECTION, SOLUTION INTRAVENOUS at 19:06

## 2020-01-01 RX ADMIN — VENLAFAXINE HYDROCHLORIDE 150 MG: 150 CAPSULE, EXTENDED RELEASE ORAL at 08:59

## 2020-01-01 RX ADMIN — SUCRALFATE 1 G: 1 TABLET ORAL at 11:12

## 2020-01-01 RX ADMIN — OCTREOTIDE ACETATE 25 MCG: 50 INJECTION, SOLUTION INTRAVENOUS; SUBCUTANEOUS at 17:38

## 2020-01-01 RX ADMIN — LIDOCAINE HYDROCHLORIDE 80 MG: 20 INJECTION, SOLUTION EPIDURAL; INFILTRATION; INTRACAUDAL; PERINEURAL at 14:07

## 2020-01-01 RX ADMIN — CLONAZEPAM 0.5 MG: 0.5 TABLET ORAL at 21:56

## 2020-01-01 RX ADMIN — ALBUMIN (HUMAN) 12.5 G: 12.5 INJECTION, SOLUTION INTRAVENOUS at 09:53

## 2020-01-01 RX ADMIN — POTASSIUM CHLORIDE 20 MEQ: 750 TABLET, FILM COATED, EXTENDED RELEASE ORAL at 11:47

## 2020-01-01 RX ADMIN — SUCRALFATE 1 G: 1 TABLET ORAL at 07:06

## 2020-01-01 RX ADMIN — Medication 100 MG: at 09:34

## 2020-01-01 RX ADMIN — MAGNESIUM OXIDE TAB 400 MG (241.3 MG ELEMENTAL MG) 800 MG: 400 (241.3 MG) TAB at 17:46

## 2020-01-01 RX ADMIN — ACETYLCYSTEINE 600 MG: 200 SOLUTION ORAL; RESPIRATORY (INHALATION) at 14:23

## 2020-01-01 RX ADMIN — BUMETANIDE 1 MG: 0.25 INJECTION INTRAMUSCULAR; INTRAVENOUS at 18:53

## 2020-01-01 RX ADMIN — TAMSULOSIN HYDROCHLORIDE 0.8 MG: 0.4 CAPSULE ORAL at 22:15

## 2020-01-01 RX ADMIN — COLLAGENASE SANTYL: 250 OINTMENT TOPICAL at 08:21

## 2020-01-01 RX ADMIN — BUDESONIDE 500 MCG: 0.5 INHALANT RESPIRATORY (INHALATION) at 18:25

## 2020-01-01 RX ADMIN — TAMSULOSIN HYDROCHLORIDE 0.4 MG: 0.4 CAPSULE ORAL at 11:47

## 2020-01-01 RX ADMIN — WARFARIN SODIUM 4 MG: 4 TABLET ORAL at 17:38

## 2020-01-01 RX ADMIN — SENNOSIDES AND DOCUSATE SODIUM 1 TABLET: 8.6; 5 TABLET ORAL at 09:06

## 2020-01-01 RX ADMIN — LEVETIRACETAM 250 MG: 250 TABLET ORAL at 17:01

## 2020-01-01 RX ADMIN — AMOXICILLIN AND CLAVULANATE POTASSIUM 1 TABLET: 875; 125 TABLET, FILM COATED ORAL at 17:40

## 2020-01-01 RX ADMIN — SODIUM BICARBONATE 650 MG: 650 TABLET ORAL at 18:07

## 2020-01-01 RX ADMIN — CIPROFLOXACIN 750 MG: 500 TABLET, FILM COATED ORAL at 11:42

## 2020-01-01 RX ADMIN — VENLAFAXINE HYDROCHLORIDE 75 MG: 37.5 CAPSULE, EXTENDED RELEASE ORAL at 08:32

## 2020-01-01 RX ADMIN — MAGNESIUM OXIDE TAB 400 MG (241.3 MG ELEMENTAL MG) 400 MG: 400 (241.3 MG) TAB at 17:25

## 2020-01-01 RX ADMIN — MIDODRINE HYDROCHLORIDE 5 MG: 5 TABLET ORAL at 17:08

## 2020-01-01 RX ADMIN — HYDRALAZINE HYDROCHLORIDE 20 MG: 20 INJECTION INTRAMUSCULAR; INTRAVENOUS at 00:05

## 2020-01-01 RX ADMIN — SILDENAFIL CITRATE 20 MG: 20 TABLET ORAL at 08:12

## 2020-01-01 RX ADMIN — SUCRALFATE 1 G: 1 TABLET ORAL at 15:53

## 2020-01-01 RX ADMIN — ARFORMOTEROL TARTRATE 15 MCG: 15 SOLUTION RESPIRATORY (INHALATION) at 21:54

## 2020-01-01 RX ADMIN — AMOXICILLIN AND CLAVULANATE POTASSIUM 1 TABLET: 875; 125 TABLET, FILM COATED ORAL at 08:33

## 2020-01-01 RX ADMIN — Medication 10 ML: at 04:45

## 2020-01-01 RX ADMIN — OCTREOTIDE ACETATE 25 MCG: 50 INJECTION, SOLUTION INTRAVENOUS; SUBCUTANEOUS at 17:19

## 2020-01-01 RX ADMIN — MEROPENEM 500 MG: 500 INJECTION, POWDER, FOR SOLUTION INTRAVENOUS at 22:11

## 2020-01-01 RX ADMIN — ALBUMIN (HUMAN) 12.5 G: 0.25 INJECTION, SOLUTION INTRAVENOUS at 11:40

## 2020-01-01 RX ADMIN — SUCRALFATE 1 G: 1 TABLET ORAL at 18:14

## 2020-01-01 RX ADMIN — MEROPENEM 500 MG: 500 INJECTION, POWDER, FOR SOLUTION INTRAVENOUS at 20:24

## 2020-01-01 RX ADMIN — HYDRALAZINE HYDROCHLORIDE 10 MG: 10 TABLET, FILM COATED ORAL at 18:00

## 2020-01-01 RX ADMIN — FINASTERIDE 5 MG: 5 TABLET, FILM COATED ORAL at 09:06

## 2020-01-01 RX ADMIN — BUDESONIDE 500 MCG: 0.5 INHALANT RESPIRATORY (INHALATION) at 08:02

## 2020-01-01 RX ADMIN — SILDENAFIL CITRATE 20 MG: 20 TABLET ORAL at 16:23

## 2020-01-01 RX ADMIN — MELATONIN 2 TABLET: at 08:25

## 2020-01-01 RX ADMIN — VENLAFAXINE HYDROCHLORIDE 150 MG: 150 CAPSULE, EXTENDED RELEASE ORAL at 11:41

## 2020-01-01 RX ADMIN — MEROPENEM 500 MG: 500 INJECTION, POWDER, FOR SOLUTION INTRAVENOUS at 11:00

## 2020-01-01 RX ADMIN — ACETYLCYSTEINE 600 MG: 200 SOLUTION ORAL; RESPIRATORY (INHALATION) at 15:44

## 2020-01-01 RX ADMIN — OCTREOTIDE ACETATE 25 MCG: 50 INJECTION, SOLUTION INTRAVENOUS; SUBCUTANEOUS at 22:59

## 2020-01-01 RX ADMIN — OCTREOTIDE ACETATE 25 MCG: 50 INJECTION, SOLUTION INTRAVENOUS; SUBCUTANEOUS at 21:48

## 2020-01-01 RX ADMIN — MIDODRINE HYDROCHLORIDE 2.5 MG: 5 TABLET ORAL at 09:33

## 2020-01-01 RX ADMIN — Medication 1 CAPSULE: at 08:41

## 2020-01-01 RX ADMIN — SILDENAFIL CITRATE 20 MG: 20 TABLET ORAL at 21:27

## 2020-01-01 RX ADMIN — HYDRALAZINE HYDROCHLORIDE 5 MG: 20 INJECTION INTRAMUSCULAR; INTRAVENOUS at 03:22

## 2020-01-01 RX ADMIN — SILDENAFIL CITRATE 10 MG: 20 TABLET ORAL at 09:00

## 2020-01-01 RX ADMIN — HYDROCORTISONE: 1 CREAM TOPICAL at 23:27

## 2020-01-01 RX ADMIN — TAMSULOSIN HYDROCHLORIDE 0.4 MG: 0.4 CAPSULE ORAL at 08:42

## 2020-01-01 RX ADMIN — SUCRALFATE 1 G: 1 TABLET ORAL at 13:38

## 2020-01-01 RX ADMIN — OCTREOTIDE ACETATE 25 MCG: 50 INJECTION, SOLUTION INTRAVENOUS; SUBCUTANEOUS at 17:44

## 2020-01-01 RX ADMIN — Medication 10 ML: at 07:00

## 2020-01-01 RX ADMIN — SUCRALFATE 1 G: 1 TABLET ORAL at 17:30

## 2020-01-01 RX ADMIN — MEROPENEM 500 MG: 500 INJECTION, POWDER, FOR SOLUTION INTRAVENOUS at 08:49

## 2020-01-01 RX ADMIN — MAGNESIUM OXIDE TAB 400 MG (241.3 MG ELEMENTAL MG) 400 MG: 400 (241.3 MG) TAB at 08:59

## 2020-01-01 RX ADMIN — DIPHENHYDRAMINE HYDROCHLORIDE 12.5 MG: 50 INJECTION, SOLUTION INTRAMUSCULAR; INTRAVENOUS at 06:17

## 2020-01-01 RX ADMIN — MEROPENEM 500 MG: 500 INJECTION, POWDER, FOR SOLUTION INTRAVENOUS at 08:12

## 2020-01-01 RX ADMIN — Medication 10 ML: at 22:43

## 2020-01-01 RX ADMIN — Medication 1 CAPSULE: at 08:31

## 2020-01-01 RX ADMIN — ACETAMINOPHEN 650 MG: 325 TABLET ORAL at 23:22

## 2020-01-01 RX ADMIN — CEFEPIME HYDROCHLORIDE 2 G: 2 INJECTION, POWDER, FOR SOLUTION INTRAVENOUS at 15:53

## 2020-01-01 RX ADMIN — OXYMETAZOLINE HYDROCHLORIDE 2 SPRAY: 0.05 SPRAY NASAL at 17:07

## 2020-01-01 RX ADMIN — VENLAFAXINE HYDROCHLORIDE 150 MG: 150 CAPSULE, EXTENDED RELEASE ORAL at 09:36

## 2020-01-01 RX ADMIN — OCTREOTIDE ACETATE 25 MCG: 50 INJECTION, SOLUTION INTRAVENOUS; SUBCUTANEOUS at 09:01

## 2020-01-01 RX ADMIN — Medication 100 MG: at 09:06

## 2020-01-01 RX ADMIN — WARFARIN SODIUM 3 MG: 1 TABLET ORAL at 18:00

## 2020-01-01 RX ADMIN — VENLAFAXINE HYDROCHLORIDE 150 MG: 150 CAPSULE, EXTENDED RELEASE ORAL at 09:15

## 2020-01-01 RX ADMIN — SODIUM CHLORIDE 300 MG: 900 INJECTION, SOLUTION INTRAVENOUS at 18:10

## 2020-01-01 RX ADMIN — VENLAFAXINE HYDROCHLORIDE 150 MG: 150 CAPSULE, EXTENDED RELEASE ORAL at 08:42

## 2020-01-01 RX ADMIN — OCTREOTIDE ACETATE 25 MCG: 50 INJECTION, SOLUTION INTRAVENOUS; SUBCUTANEOUS at 21:26

## 2020-01-01 RX ADMIN — POTASSIUM CHLORIDE 10 MEQ: 750 TABLET, FILM COATED, EXTENDED RELEASE ORAL at 08:44

## 2020-01-01 RX ADMIN — DIPHENHYDRAMINE HYDROCHLORIDE 25 MG: 25 CAPSULE ORAL at 00:44

## 2020-01-01 RX ADMIN — ACETYLCYSTEINE 1200 MG: 200 SOLUTION ORAL; RESPIRATORY (INHALATION) at 07:18

## 2020-01-01 RX ADMIN — Medication 100 MG: at 08:00

## 2020-01-01 RX ADMIN — LEVETIRACETAM 250 MG: 250 TABLET ORAL at 18:07

## 2020-01-01 RX ADMIN — SUCRALFATE 1 G: 1 TABLET ORAL at 16:41

## 2020-01-01 RX ADMIN — ARFORMOTEROL TARTRATE 15 MCG: 15 SOLUTION RESPIRATORY (INHALATION) at 07:26

## 2020-01-01 RX ADMIN — TAMSULOSIN HYDROCHLORIDE 0.4 MG: 0.4 CAPSULE ORAL at 09:01

## 2020-01-01 RX ADMIN — SUCRALFATE 1 G: 1 TABLET ORAL at 16:21

## 2020-01-01 RX ADMIN — ACETAMINOPHEN 650 MG: 325 TABLET, FILM COATED ORAL at 19:55

## 2020-01-01 RX ADMIN — Medication 1 CAPSULE: at 08:32

## 2020-01-01 RX ADMIN — MAGNESIUM OXIDE TAB 400 MG (241.3 MG ELEMENTAL MG) 800 MG: 400 (241.3 MG) TAB at 09:44

## 2020-01-01 RX ADMIN — MAGNESIUM OXIDE TAB 400 MG (241.3 MG ELEMENTAL MG) 800 MG: 400 (241.3 MG) TAB at 09:04

## 2020-01-01 RX ADMIN — LORAZEPAM 0.5 MG: 2 INJECTION, SOLUTION INTRAMUSCULAR; INTRAVENOUS at 08:24

## 2020-01-01 RX ADMIN — PIPERACILLIN AND TAZOBACTAM 3.38 G: 3; .375 INJECTION, POWDER, LYOPHILIZED, FOR SOLUTION INTRAVENOUS at 20:16

## 2020-01-01 RX ADMIN — SUCRALFATE 1 G: 1 TABLET ORAL at 07:04

## 2020-01-01 RX ADMIN — Medication 10 ML: at 05:22

## 2020-01-01 RX ADMIN — OCTREOTIDE ACETATE 25 MCG: 50 INJECTION, SOLUTION INTRAVENOUS; SUBCUTANEOUS at 22:53

## 2020-01-01 RX ADMIN — FLUDROCORTISONE ACETATE 0.1 MG: 0.1 TABLET ORAL at 08:25

## 2020-01-01 RX ADMIN — Medication 10 ML: at 09:36

## 2020-01-01 RX ADMIN — OCTREOTIDE ACETATE 25 MCG: 50 INJECTION, SOLUTION INTRAVENOUS; SUBCUTANEOUS at 18:31

## 2020-01-01 RX ADMIN — MELATONIN 2 TABLET: at 08:36

## 2020-01-01 RX ADMIN — SUCRALFATE 1 G: 1 TABLET ORAL at 06:43

## 2020-01-01 RX ADMIN — OCTREOTIDE ACETATE 25 MCG: 50 INJECTION, SOLUTION INTRAVENOUS; SUBCUTANEOUS at 15:12

## 2020-01-01 RX ADMIN — MAGNESIUM OXIDE TAB 400 MG (241.3 MG ELEMENTAL MG) 800 MG: 400 (241.3 MG) TAB at 17:02

## 2020-01-01 RX ADMIN — MAGNESIUM OXIDE TAB 400 MG (241.3 MG ELEMENTAL MG) 800 MG: 400 (241.3 MG) TAB at 08:12

## 2020-01-01 RX ADMIN — Medication 10 ML: at 18:00

## 2020-01-01 RX ADMIN — INSULIN LISPRO 2 UNITS: 100 INJECTION, SOLUTION INTRAVENOUS; SUBCUTANEOUS at 12:11

## 2020-01-01 RX ADMIN — Medication 10 ML: at 07:21

## 2020-01-01 RX ADMIN — MEROPENEM 500 MG: 500 INJECTION, POWDER, FOR SOLUTION INTRAVENOUS at 10:59

## 2020-01-01 RX ADMIN — HYDRALAZINE HYDROCHLORIDE 20 MG: 20 INJECTION INTRAMUSCULAR; INTRAVENOUS at 20:32

## 2020-01-01 RX ADMIN — TAMSULOSIN HYDROCHLORIDE 0.8 MG: 0.4 CAPSULE ORAL at 02:37

## 2020-01-01 RX ADMIN — SACUBITRIL AND VALSARTAN 1 TABLET: 24; 26 TABLET, FILM COATED ORAL at 08:41

## 2020-01-01 RX ADMIN — FLUDROCORTISONE ACETATE 0.1 MG: 0.1 TABLET ORAL at 09:44

## 2020-01-01 RX ADMIN — Medication 10 ML: at 21:54

## 2020-01-01 RX ADMIN — SUCRALFATE 1 G: 1 TABLET ORAL at 18:02

## 2020-01-01 RX ADMIN — MEROPENEM 500 MG: 500 INJECTION, POWDER, FOR SOLUTION INTRAVENOUS at 13:05

## 2020-01-01 RX ADMIN — LEVETIRACETAM 250 MG: 250 TABLET ORAL at 08:27

## 2020-01-01 RX ADMIN — SALINE NASAL SPRAY 2 SPRAY: 1.5 SOLUTION NASAL at 17:07

## 2020-01-01 RX ADMIN — Medication 1 CAPSULE: at 09:44

## 2020-01-01 RX ADMIN — ALBUMIN (HUMAN) 12.5 G: 0.25 INJECTION, SOLUTION INTRAVENOUS at 08:32

## 2020-01-01 RX ADMIN — Medication 10 ML: at 07:16

## 2020-01-01 RX ADMIN — MEROPENEM 500 MG: 500 INJECTION, POWDER, FOR SOLUTION INTRAVENOUS at 10:32

## 2020-01-01 RX ADMIN — EPOETIN ALFA-EPBX 20000 UNITS: 10000 INJECTION, SOLUTION INTRAVENOUS; SUBCUTANEOUS at 22:18

## 2020-01-01 RX ADMIN — MEROPENEM 500 MG: 500 INJECTION, POWDER, FOR SOLUTION INTRAVENOUS at 13:21

## 2020-01-01 RX ADMIN — MIDODRINE HYDROCHLORIDE 5 MG: 5 TABLET ORAL at 13:05

## 2020-01-01 RX ADMIN — SILDENAFIL CITRATE 20 MG: 20 TABLET ORAL at 08:32

## 2020-01-01 RX ADMIN — FINASTERIDE 5 MG: 5 TABLET, FILM COATED ORAL at 09:51

## 2020-01-01 RX ADMIN — LORAZEPAM 1 MG: 2 INJECTION INTRAMUSCULAR; INTRAVENOUS at 07:22

## 2020-01-01 RX ADMIN — CIPROFLOXACIN 750 MG: 500 TABLET, FILM COATED ORAL at 12:23

## 2020-01-01 RX ADMIN — SUCRALFATE 1 G: 1 TABLET ORAL at 21:06

## 2020-01-01 RX ADMIN — HYDRALAZINE HYDROCHLORIDE 10 MG: 10 TABLET, FILM COATED ORAL at 17:47

## 2020-01-01 RX ADMIN — BUDESONIDE 500 MCG: 0.5 INHALANT RESPIRATORY (INHALATION) at 21:30

## 2020-01-01 RX ADMIN — MAGNESIUM OXIDE TAB 400 MG (241.3 MG ELEMENTAL MG) 800 MG: 400 (241.3 MG) TAB at 18:13

## 2020-01-01 RX ADMIN — SUCRALFATE 1 G: 1 TABLET ORAL at 07:26

## 2020-01-01 RX ADMIN — HYDRALAZINE HYDROCHLORIDE 10 MG: 20 INJECTION INTRAMUSCULAR; INTRAVENOUS at 16:57

## 2020-01-01 RX ADMIN — LEVETIRACETAM 250 MG: 250 TABLET ORAL at 08:20

## 2020-01-01 RX ADMIN — MEROPENEM 500 MG: 500 INJECTION, POWDER, FOR SOLUTION INTRAVENOUS at 21:48

## 2020-01-01 RX ADMIN — ASPIRIN 81 MG: 81 TABLET, COATED ORAL at 08:27

## 2020-01-01 RX ADMIN — MELATONIN 2 TABLET: at 09:55

## 2020-01-01 RX ADMIN — MEROPENEM 500 MG: 500 INJECTION, POWDER, FOR SOLUTION INTRAVENOUS at 14:26

## 2020-01-01 RX ADMIN — FINASTERIDE 5 MG: 5 TABLET, FILM COATED ORAL at 08:44

## 2020-01-01 RX ADMIN — THERA TABS 1 TABLET: TAB at 08:39

## 2020-01-01 RX ADMIN — ALBUMIN (HUMAN) 12.5 G: 0.25 INJECTION, SOLUTION INTRAVENOUS at 08:55

## 2020-01-01 RX ADMIN — IPRATROPIUM BROMIDE AND ALBUTEROL SULFATE 3 ML: .5; 3 SOLUTION RESPIRATORY (INHALATION) at 15:55

## 2020-01-01 RX ADMIN — CEFEPIME HYDROCHLORIDE 2 G: 2 INJECTION, POWDER, FOR SOLUTION INTRAVENOUS at 15:01

## 2020-01-01 RX ADMIN — IPRATROPIUM BROMIDE AND ALBUTEROL SULFATE 3 ML: .5; 3 SOLUTION RESPIRATORY (INHALATION) at 10:03

## 2020-01-01 RX ADMIN — Medication 10 ML: at 14:34

## 2020-01-01 RX ADMIN — SUCRALFATE 1 G: 1 TABLET ORAL at 12:02

## 2020-01-01 RX ADMIN — Medication 10 ML: at 06:42

## 2020-01-01 RX ADMIN — HYDROMORPHONE HYDROCHLORIDE 1 MG: 1 INJECTION, SOLUTION INTRAMUSCULAR; INTRAVENOUS; SUBCUTANEOUS at 12:41

## 2020-01-01 RX ADMIN — LEVETIRACETAM 250 MG: 250 TABLET ORAL at 17:43

## 2020-01-01 RX ADMIN — Medication 80 MCG: at 14:35

## 2020-01-01 RX ADMIN — Medication 100 MG: at 08:25

## 2020-01-01 RX ADMIN — LORAZEPAM 2 MG: 2 INJECTION INTRAMUSCULAR; INTRAVENOUS at 13:20

## 2020-01-01 RX ADMIN — OCTREOTIDE ACETATE 25 MCG: 50 INJECTION, SOLUTION INTRAVENOUS; SUBCUTANEOUS at 22:00

## 2020-01-01 RX ADMIN — MIDODRINE HYDROCHLORIDE 5 MG: 5 TABLET ORAL at 11:23

## 2020-01-01 RX ADMIN — FLUDROCORTISONE ACETATE 0.1 MG: 0.1 TABLET ORAL at 08:20

## 2020-01-01 RX ADMIN — MIDODRINE HYDROCHLORIDE 5 MG: 5 TABLET ORAL at 16:52

## 2020-01-01 RX ADMIN — FLUDROCORTISONE ACETATE 0.1 MG: 0.1 TABLET ORAL at 08:43

## 2020-01-01 NOTE — PROGRESS NOTES
[facility] VAD/Heart Failure Progress Note    Admit Date: 10/25/2019  POD:  37 Days Post-Op    Procedure:  Procedure(s):  LEFT BRACHIAL THROMBECTOMY        Subjective:   Dyspnea, fatigue, and weakness; bladder spasms; hematuria improved; hypotension at times      Objective:   Vitals:  Blood pressure 91/72, pulse 79, temperature 98.6 °F (37 °C), resp. rate 18, height 6' 2\" (1.88 m), weight 175 lb 11.3 oz (79.7 kg), SpO2 96 %.   Temp (24hrs), Av.1 °F (37.3 °C), Min:98.1 °F (36.7 °C), Max:100.4 °F (38 °C)    Hemodynamics:   CO: CO (l/min): 5.8 l/min   CI: CI (l/min/m2): 2.8 l/min/m2   CVP: CVP (mmHg): 4 mmHg (19 1645)   SVR: SVR (dyne*sec)/cm5: 1080 (dyne*sec)/cm5 (19 5719)   PAP Systolic: PAP Systolic: 34 (37/66/81 3804)   PAP Diastolic: PAP Diastolic: 13 (89/56/94 8791)   PVR:     SV02: SVO2 (%): 67 % (19 1300)   SCV02: SCVO2 (%): 75 % (10/29/19 1900)    VAD Interrogation: LVAD (Heartmate)  Pump Speed (RPM): 6400  Pump Flow (LPM): 5.8  PI (Pulsitility Index): 3.7  Power: 5.6  MAP: 76   Test: No  Back Up  at Bedside & Labeled: Yes  Power Module Test: No  Driveline Site Care: No  Driveline Dressing: Clean, Dry, and Intact    EKG/Rhythm:      Extubation Date / Time:     CT Output:     Ventilator:  Ventilator Volumes  Vt Set (ml): 550 ml (19 08)  Vt Exhaled (Machine Breath) (ml): 639 ml (19 08)  Vt Spont (ml): 437 ml (19 1035)  Ve Observed (l/min): 7.25 l/min (19 1035)    Oxygen Therapy:  Oxygen Therapy  O2 Sat (%): 96 % (20 1118)  Pulse via Oximetry: 85 beats per minute (20 1028)  O2 Device: Nasal cannula (20 111)  PEEP/CPAP (cm H2O): 3 cm H20 (19 1504)  O2 Flow Rate (L/min): 1 l/min (20 1028)  FIO2 (%): 21 % (19 0823)    CXR:    Admission Weight: Last Weight   Weight: 192 lb 10.9 oz (87.4 kg) Weight: 175 lb 11.3 oz (79.7 kg)     Intake / Output / Drain:  Current Shift: 701 - 01/01 1900  In: 505 [P.O.:480; I.V.:25]  Out: 600 [Urine:600]  Last 24 hrs.:     Intake/Output Summary (Last 24 hours) at 1/1/2020 1201  Last data filed at 1/1/2020 1118  Gross per 24 hour   Intake 2238.7 ml   Output 2025 ml   Net 213.7 ml             No results for input(s): CPK, CKMB, TROIQ in the last 72 hours. Recent Labs     01/01/20  0414 12/31/19  1618 12/31/19  1525 12/31/19  0323  12/30/19  0415   *  --   --  130*  --  128*   K 4.6  --   --  4.4  --  4.1   CO2 28  --   --  28  --  29   BUN 21*  --   --  20  --  15   CREA 1.35*  --   --  1.59*  --  1.30   GLU 96  --   --  141*  --  103*   MG 2.0  --   --  1.7  --  1.9   WBC 7.6  --   --  8.4  --  6.3   HGB 8.2* 8.0* 6.3* 8.4*   < > 7.5*   HCT 26.8* 25.8* 20.5* 27.2*   < > 23.9*   *  --   --  140*  --  179    < > = values in this interval not displayed.      Recent Labs     01/01/20  0414 12/31/19  0323 12/30/19  0415   INR 2.1* 2.1* 1.9*   PTP 20.4* 20.8* 19.0*   APTT 42.9* 40.7* 40.0*     No lab exists for component: PBNP        Current Facility-Administered Medications:     warfarin (COUMADIN) tablet 1 mg, 1 mg, Oral, ONCE, Mounika Altman MD    sildenafil (pulm.hypertension) (REVATIO) tablet 10 mg, 10 mg, Oral, TID, Zeke Chow E, NP, 10 mg at 01/01/20 0900    oxybutynin (DITROPAN) tablet 5 mg, 5 mg, Oral, Q6H PRN, Shana Sims, NP, 5 mg at 12/31/19 1137    cefepime (MAXIPIME) 2 g in 0.9% sodium chloride (MBP/ADV) 100 mL, 2 g, IntraVENous, Q24H, Elizabeth Olivares MD, Last Rate: 200 mL/hr at 12/31/19 1540, 2 g at 12/31/19 1540    [Held by provider] bumetanide (BUMEX) injection 1 mg, 1 mg, IntraVENous, BID, Zeke Bucks E, NP, 1 mg at 12/30/19 4322    magnesium oxide (MAG-OX) tablet 800 mg, 800 mg, Oral, BID, Zeke Geraldo E, NP, 800 mg at 01/01/20 0912    milrinone (PRIMACOR) 20 MG/100 ML D5W infusion, 0.125 mcg/kg/min, IntraVENous, CONTINUOUS, Shana Sims NP, Last Rate: 2.8 mL/hr at 12/31/19 1328, 0.125 mcg/kg/min at 12/31/19 1328    albumin human 5% (BUMINATE) solution 12.5 g, 12.5 g, IntraVENous, DIALYSIS PRN, Logan Kincaid MD    lidocaine (URO-JET/ GLYDO) 2 % jelly, , Urethral, PRN, Nichelle Altamn MD    senna-docusate (PERICOLACE) 8.6-50 mg per tablet 1 Tab, 1 Tab, Oral, DAILY, Soraya Herrera NP, 1 Tab at 01/01/20 0912    epoetin yvonne-epbx (RETACRIT) injection 20,000 Units, 20,000 Units, SubCUTAneous, Q MON, WED & FRI, Logan Kincaid MD, 20,000 Units at 12/30/19 2123    levETIRAcetam (KEPPRA) tablet 125 mg, 125 mg, Oral, BID, Soraya Herrera NP, 125 mg at 01/01/20 0911    dronabinol (MARINOL) capsule 2.5 mg, 2.5 mg, Oral, DAILY, Patrick Herrera NP, 2.5 mg at 12/30/19 1518    polyethylene glycol (MIRALAX) packet 17 g, 17 g, Oral, DAILY, Patrick Herrera NP, 17 g at 01/01/20 0911    albuterol-ipratropium (DUO-NEB) 2.5 MG-0.5 MG/3 ML, 3 mL, Nebulization, Q6H PRN, Alexa Lopez MD, 3 mL at 12/25/19 1407    therapeutic multivitamin (THERAGRAN) tablet 1 Tab, 1 Tab, Oral, DAILY, LeviShana metzger NP, 1 Tab at 01/01/20 0912    escitalopram oxalate (LEXAPRO) tablet 10 mg, 10 mg, Oral, QPM, Mounika Altman MD, 10 mg at 12/31/19 1709    potassium chloride SR (KLOR-CON 10) tablet 40 mEq, 40 mEq, Oral, DAILY, LeviShana metzger NP, 40 mEq at 01/01/20 0911    alteplase (CATHFLO) 1 mg in sterile water (preservative free) 1 mL injection, 1 mg, InterCATHeter, PRN, Mounika Mcpherson MD, 1 mg at 12/31/19 1121    finasteride (PROSCAR) tablet 5 mg, 5 mg, Oral, DAILY, Maryjo Hodgkin, MD, 5 mg at 01/01/20 0900    spironolactone (ALDACTONE) tablet 25 mg, 25 mg, Oral, DAILY, Mounika Altman MD, 25 mg at 01/01/20 0900    clonazePAM (KlonoPIN) tablet 0.5 mg, 0.5 mg, Oral, TID PRN, Mounika Mcpherson MD, 0.5 mg at 12/31/19 1328    diphenhydrAMINE (BENADRYL) capsule 25 mg, 25 mg, Oral, QHS PRN, Patrick Herrera, TIERRA, 25 mg at 12/31/19 1819    octreotide (SANDOSTATIN) injection 25 mcg, 25 mcg, SubCUTAneous, TID, Polliard, Paddy Holes, NP, 25 mcg at 01/01/20 0912    benzocaine-menthol (CEPACOL) lozenge 1 Lozenge, 1 Lozenge, Mucous Membrane, PRN, Polliard, Paddy Holes, NP    digoxin (LANOXIN) tablet 0.0625 mg, 62.5 mcg, Oral, Q MON, WED & FRI, Polliard, Paddy Holes, NP, 0.0625 mg at 12/30/19 2121    pantoprazole (PROTONIX) tablet 40 mg, 40 mg, Oral, ACB, Polliard, Paddy Holes, NP, 40 mg at 01/01/20 0718    allopurinol (ZYLOPRIM) tablet 100 mg, 100 mg, Oral, DAILY, Polliard, Jenness Ej T, NP, 100 mg at 01/01/20 0912    arformoterol (BROVANA) neb solution 15 mcg, 15 mcg, Nebulization, BID RT, 15 mcg at 01/01/20 1027 **AND** budesonide (PULMICORT) 500 mcg/2 ml nebulizer suspension, 500 mcg, Nebulization, BID RT, Polliard, Jenness Ej T, NP, 500 mcg at 01/01/20 1028    artificial saliva (MOUTH KOTE) 1 Spray, 1 Spray, Oral, PRN, Polliard, Paddy Holes, NP, 1 Spray at 12/12/19 1452    lactobac ac& pc-s.therm-b.anim (TIAN Q/RISAQUAD), 1 Cap, Oral, DAILY, Ellen Polk MD, 1 Cap at 01/01/20 0911    oxyCODONE IR (ROXICODONE) tablet 5 mg, 5 mg, Oral, Q6H PRN, Diya Huitron MD, 5 mg at 12/30/19 0405    tamsulosin (FLOMAX) capsule 0.4 mg, 0.4 mg, Oral, DAILY, Maureen Posada MD, 0.4 mg at 01/01/20 0900    insulin lispro (HUMALOG) injection, , SubCUTAneous, AC&HS, Shana Sims, NP, Stopped at 12/30/19 2200    Warfarin MD/NP dosing, , Other, PRN, Mounika Altman MD    sodium chloride (NS) flush 5-40 mL, 5-40 mL, IntraVENous, Q8H, Harshal Miller MD, 10 mL at 01/01/20 0718    sodium chloride (NS) flush 5-40 mL, 5-40 mL, IntraVENous, PRN, Diya Huitron MD, 20 mL at 12/29/19 1039    acetaminophen (TYLENOL) tablet 650 mg, 650 mg, Oral, Q6H PRN, Diya Huitron MD, 650 mg at 12/31/19 1858    naloxone [facility]) injection 0.4 mg, 0.4 mg, IntraVENous, PRN, Diya Huitron MD    ondansetron [facility]) injection 4 mg, 4 mg, IntraVENous, Q4H PRN, Diya Huitron MD, 4 mg at 12/30/19 5086    bisacodyl (DULCOLAX) tablet 5 mg, 5 mg, Oral, DAILY PRN, Aurora Ann MD, 5 mg at 12/22/19 1533    sucralfate (CARAFATE) tablet 1 g, 1 g, Oral, AC&HS, Aurora Ann MD, 1 g at 01/01/20 1115    influenza vaccine 2019-20 (6 mos+)(PF) (FLUARIX/FLULAVAL/FLUZONE QUAD) injection 0.5 mL, 0.5 mL, IntraMUSCular, PRIOR TO DISCHARGE, Elijah Altman MD    hydrALAZINE (APRESOLINE) 20 mg/mL injection 20 mg, 20 mg, IntraVENous, Q6H PRN, Shana Sims NP, 20 mg at 12/10/19 0541    dextrose 10 % infusion 125-250 mL, 125-250 mL, IntraVENous, PRN, Mounika Nelson MD, Stopped at 11/18/19 0700    A/P     S/P LVAD - good flows  Need for anti-coagulation - coumadin  DM - insulin  RV dysfunction - milrinone, diuretics      Risk of morbidity and mortality - high  Medical decision making - high complexity       Signed By: Joseph Lindquist MD

## 2020-01-01 NOTE — PROGRESS NOTES
[facility] in [place], [place]  Inpatient Progress Note      Patient name: Justice Weems  Patient : 1950  Patient MRN: [id number]  Attending MD: Kendall Govea MD  Date of service: 20    Chief Complaint:   Harrison [place] azucena LVAD implant     HPI: Sona Kiser is a 71y.o. year old pleasant white male with a history of HTN, HLD, JOSE, CAD s/p cardiac arrest VFib s/p CABG () c/b sternal would infection and sternectomy, ischemic cardiomyopathy LVEF 15-20%, s/p ICD and with LBBB. He was admitted with acute on chronic systolic heart failure with massive volume overload > 20 lbs, in the setting of atrial fibrillation s/p failed DCCV and underwent DCCV and RHC on .  S/p BiVICD on 19 with Dr. Mark Thomas. He was discharged home home on IV milrinone on 19. [facility] has been followed closely by Dr. Janie Clark and the [facility].     Mr. Kiser was admitted for acute on chronic systolic heart failure. He underwent implantation of Impella 5.0 due to CS and has completed his LVAD evaluation. [facility] meets criteria for implant of HM3 as DT. He was NYHA Class IV prior to implant of Impella 5.0, has LVEF < 15%, was intolerant of GDMT due to symptomatic hypotension and renal dysfunction. He remains dependent on temporary MCS support. RHC  revealed compensated hemodynamics on Impella. His renal function has improved dramatically with improvement in his hemodynamics. He is not a suitable transplant candidate due to single kidney, sternectomy, debility, and frailty. He was reviewed by Nina Lim and felt to be a high risk heart transplant candidate due to multiple co-morbidities as well as the afore mentioned conditions.  He remained in the CCU and underwent a HM 3 implant as DT on .   He was weaned off of pressors and transferred to [place] [address] where he continues to undergo PT/OT and hemodynamic optimization.       24Hr Events  2/4 Blood cultures positive for gram negative rods    Procedure:  Procedure(s):  REMOVAL TEMPORARY LEFT VENTRICULAR ASSIST DEVICE, IMPLANTATION OF LEFT VENTRICULAR ASSIST DEVICE PERMANENT (VAD), ECC, JACQUE, EPI AORTIC US BY DR Ashley Edwards.     POD:  43     Impression / Plan:   Heart Failure Status: NYHA Class IV    Chronic systolic heart failure due to ICM, NYHA Class IV, EF < 15%, cardiogenic shock bridged with Impella 5.0 support to HM 3 implant   S/p Impella removal and LVAD implant 11/18/19  RPMs 6400 rpm - frequent PI events  TTE with bubble study negative for PFO  Decrease milrinone back to 0.125 mcg/kg/min-for syncopal   Obtain CardioMEMS readings daily- PAD 11 on 12/30  Holding diuretics  Encourage PO fluids  Decrease Sildenafil to 10 mg PO TID due to syncope - rx sent to local pharmacy to initiate PA   Intolerant of BB due to RV failure  Intolerant of ACEi/ARB/ARNI/AA due to JUAN J on CKD 3  Strict I/O, daily weights, Na+ restricted diet   Continue LVAD education   Dressing change frequency three times weekly, Sutures removed 12/26/19  Delayed skin healing on bottom portion of sternotomy- evaluated by CTS, no intervention   TTE 12/16- EF 15-20%    Bacteremia - GNRs  Likely d/t UTI (Pseudomonas)  On IV cefepime   Follow procalcitonin and lactic acid levels    Generalized myoclonus   Improved   Appreciate Neurology input  Decreased Keppra due to somnolence - 125 mg po BID   Head CT negative for acute process  EEG suggestive of mild generalized encephalopathic process, possibly related to underlying structural brain injury vs metabolic abnormality  Monitor closely      Anticoagulation for LVAD, INR goal 1.5-2  Goal decreased d/t persistent hematuria   No ASA d/t hematuria  INR 2.1  Coumadin - 1mg tonight    Epistaxis  Resolved      Acute Respiratory Failure post op  Improved with aggressive diuresis  Off supplemental O2  Pulmonary hygiene   Cont home CPAP- must use while sleeping   Schedule PET CT prior to discharge    Acute on CKD 3, atrophic left kidney  Appreciate Nephrology assistance  Watch Cr - improving   Hold diuretics for positive orthostatics   Albumin today    Avoid nephrotoxins, trend labs   Renally dose meds      CAD s/p CABG   Off ASA d/t hematuria  No BB d/t RV failure  Hold statin due to recent hepatic failure   LHC performed 11/13 - low likelihood of benefit from revascularization      Hx of VF arrest s/p BiV-ICD  No recent shocks  Keep K > 4 and Mg > 2   Mag ox to 800mg po BID    PAF   Dig level 1.0 -cont dig (62.5 mcg qMWF)  No BB due to RV failure   Repeat TFTs WNL     Hx of gout  Uric acid WNL    Acute blood loss anemia  Likely due to hematuria  Cont octreotide 25 mcg SQ TID   Fecal occult 12/14 negative, positive on 12/17  Appreciate urology recommendations  Hematuria improved  Monitor for now  Will d/w Urology about bladder spasms      Suspected aspiration pneumonia  Sputum culture showing scant growth of yeast  Cefepime complete    Urinary retention, c/b serratia UTI and hematuria  Appreciate Urology consult   Repeat UA with cx negative 12/15   Cystoscopy performed 11/13 shows catheter induced cystitis   Pseudomonas aeruginosa positive UA culture (12/31/19) - on Cefepmine    Malnutrition   Appreciate Nutritionist consult  Prealbumin weekly - 12.6 on 12/30   Diet as tolerated  Intolerant of mirtazapine d/t tremors, confusion   Decreased Marinol to 2.5 mg PO qPM d/t lethargy  Cont MVI     COPD   Appreciate Pulmonology assistance   Continue nebs PRN       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  No additional treatment at this time     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  Etiology unclear  Continue [facility]  Appreciate neurology assistance      Hx of sternal wound infection, sternectomy  Sternum closed post op- monitor      Vocal cord paralysis  Improved  ENT recs appreciated  Neck CT- R neck edema, no airway compromise   Speech therapy following - appreciate input    Anxiety  Klonipin 0.5 mg TID prn anxiety    Depression  Cont lexapro 10 mg qpm  Monitor QTc - 478 ms on 12/22   Monitor sodium      Debility  Continue PT/OT     Bladder spasms  Received pyridium 100mg x4 doses  Oxybutynin 5mg Q6hr prn      Ppx  Protonix   PT/OT   Bowel regimen   PICC placed 11/22/19      Dispo: Will need IP rehab. Not ready for discharge at this time        LVAD INTERROGATION:  Device interrogated in person  Device function normal, normal flow, multiple PI events    LVAD   Pump Speed (RPM): 6400  Pump Flow (LPM): 5.8  MAP: 76  PI (Pulsitility Index): 3.8  Power: 5.6   Test: Yes  Back Up  at Bedside & Labeled: Yes  Power Module Test: Yes  Driveline Site Care: No  Driveline Dressing: Clean, Dry, and Intact  Outpatient: No  MAP in Therapeutic Range (Outpatient): Yes  Testing  Alarms Reviewed: Yes  Back up SC speed: 6400  Back up Low Speed Limit: 6000  Emergency Equipment with Patient?: Yes  Emergency procedures reviewed?: Yes  Drive line site inspected?: Yes  Drive line intergrity inspected?: Yes  Drive line dressing changed?: No    PHYSICAL EXAM:  Visit Vitals  BP 91/72 (BP 1 Location: Left arm, BP Patient Position: At rest)   Pulse 84   Temp 98.1 °F (36.7 °C)   Resp 18   Ht 6' 2\" (1.88 m)   Wt 175 lb 11.3 oz (79.7 kg)   SpO2 98%   BMI 22.56 kg/m²       Physical Exam   Constitutional: He is oriented to person, place, and time. He appears well-developed. He appears cachectic. No distress. HENT:   Head: Normocephalic. Neck: Normal range of motion. Neck supple. No JVD present. Cardiovascular: Normal rate, regular rhythm and normal heart sounds. + VAD hum    Pulmonary/Chest: Effort normal and breath sounds normal. No respiratory distress. Abdominal: Soft. Bowel sounds are normal. He exhibits no distension. Musculoskeletal: Normal range of motion. General: No edema. Neurological: He is alert and oriented to person, place, and time. Skin: Skin is warm and dry. Psychiatric: He has a normal mood and affect.  His behavior is normal.   Nursing note and vitals reviewed. REVIEW OF SYSTEMS:  Review of Systems   Constitutional: Positive for malaise/fatigue. Negative for chills and fever. HENT: Positive for hearing loss. Negative for nosebleeds. Respiratory: Negative for cough and shortness of breath. Mild dyspnea   Cardiovascular: Negative for chest pain, palpitations, orthopnea and leg swelling. Gastrointestinal: Negative for heartburn, nausea and vomiting. Genitourinary: Positive for dysuria. Negative for hematuria. Bladder spasms    Musculoskeletal: Negative. Neurological: Positive for dizziness and weakness. Negative for headaches. Endo/Heme/Allergies: Does not bruise/bleed easily.        PAST MEDICAL HISTORY:  Past Medical History:   Diagnosis Date    Degenerative disc disease, lumbar     Heart failure ([facility] [address]     High cholesterol     Hypertension     Paroxysmal atrial fibrillation ([facility] [address] 4/2/2019    Spinal stenosis        PAST SURGICAL HISTORY:  Past Surgical History:   Procedure Laterality Date    COLONOSCOPY Left 11/11/2019    COLONOSCOPY at bedside performed [facility] Du [facility] at [facility] ENDOSCOPY    HX APPENDECTOMY      HX CORONARY ARTERY BYPASS GRAFT      triple    HX HERNIA REPAIR      HX IMPLANTABLE CARDIOVERTER DEFIBRILLATOR      TN CARDIOVERSION ELECTIVE ARRHYTHMIA EXTERNAL N/A 6/10/2019    EP CARDIOVERSION performed by Jhonatan Cartwright MD at [place] [address], [address] CATH LAB    TN CARDIOVERSION ELECTIVE ARRHYTHMIA EXTERNAL N/A 6/18/2019    EP CARDIOVERSION performed by Derwin Schwab, MD at [place] [address], [address] CATH LAB    TN INSJ/RPLCMT PERM DFB W/TRNSVNS LDS 1/DUAL CHMBR N/A 6/21/2019    INSERT ICD BIV MULTI performed by Meena Briggs MD at [place] [address], [address] CATH LAB    TN TCAT IMPL WRLS P-ART PRS SNR L-T HEMODYN MNTR N/A 9/18/2019    IMPLANT HEART FAILURE MONITORING DEVICE performed by Alex Martin MD at [place] [address], [address]  CATH LAB       FAMILY HISTORY:  Family History   Problem Relation Age of Onset    Lung Disease Mother    Ajith Hypertension Mother     Arthritis-osteo Mother     Heart Disease Mother     Heart Disease Father     Diabetes Father        SOCIAL HISTORY:  Social History     Socioeconomic History    Marital status:      Spouse name: Not on file    Number of children: Not on file    Years of education: Not on file    Highest education level: Not on file   Tobacco Use    Smoking status: Former Smoker     Last attempt to quit: 2010     Years since quittin.0    Smokeless tobacco: Never Used   Substance and Sexual Activity    Alcohol use: Not Currently     Comment: rarely    Drug use: Never   Other Topics Concern       LABORATORY RESULTS:     Labs Latest Ref Rng & Units 2019   WBC 4.1 - 11.1 K/uL 7.6 - - - 8.4 - 6.3   RBC 4.10 - 5.70 M/uL 2.79(L) - - - 2.86(L) - 2.49(L)   Hemoglobin 12.1 - 17.0 g/dL 8. 2(L) 8.0(L) 6. 3(L) - 8. 4(L) 8. 3(L) 7. 5(L)   Hematocrit 36.6 - 50.3 % 26. 8(L) 25. 8(L) 20. 5(L) - 27. 2(L) 26. 9(L) 23. 9(L)   MCV 80.0 - 99.0 FL 96.1 - - - 95.1 - 96.0   Platelets 215 - 630 K/uL 133(L) - - - 140(L) - 179   Lymphocytes 12 - 49 % - - - - - - -   Monocytes 5 - 13 % - - - - - - -   Eosinophils 0 - 7 % - - - - - - -   Basophils 0 - 1 % - - - - - - -   Albumin 3.5 - 5.0 g/dL 2. 8(L) - - - 2. 8(L) - 2. 7(L)   Calcium 8.5 - 10.1 MG/DL 8.7 - - - 8.8 - 9.0   SGOT 15 - 37 U/L 21 - - - 21 - 23   Glucose 65 - 100 mg/dL 96 - - - 141(H) - 103(H)   BUN 6 - 20 MG/DL 21(H) - - - 20 - 15   Creatinine 0.70 - 1.30 MG/DL 1.35(H) - - - 1.59(H) - 1.30   Sodium 136 - 145 mmol/L 132(L) - - - 130(L) - 128(L)   Potassium 3.5 - 5.1 mmol/L 4.6 - - - 4.4 - 4.1   TSH 0.36 - 3.74 uIU/mL - - - - - - -   LDH 85 - 241 U/L 167 - - 189 - - 228   CEA ng/mL - - - - - - -   Some recent data might be hidden     Lab Results   Component Value Date/Time    TSH 2.30 2019 03:29 AM    TSH 2.40 10/25/2019 07:39 PM    TSH 2.45 2019 04:16 AM       ALLERGY:  No Known Allergies     CURRENT MEDICATIONS:  Current Facility-Administered Medications   Medication Dose Route Frequency    warfarin (COUMADIN) tablet 1 mg  1 mg Oral ONCE    sildenafil (pulm.hypertension) (REVATIO) tablet 10 mg  10 mg Oral TID    oxybutynin (DITROPAN) tablet 5 mg  5 mg Oral Q6H PRN    cefepime (MAXIPIME) 2 g in 0.9% sodium chloride (MBP/ADV) 100 mL  2 g IntraVENous Q24H    [Held by provider] bumetanide (BUMEX) injection 1 mg  1 mg IntraVENous BID    magnesium oxide (MAG-OX) tablet 800 mg  800 mg Oral BID    milrinone (PRIMACOR) 20 MG/100 ML D5W infusion  0.125 mcg/kg/min IntraVENous CONTINUOUS    albumin human 5% (BUMINATE) solution 12.5 g  12.5 g IntraVENous DIALYSIS PRN    lidocaine (URO-JET/ GLYDO) 2 % jelly   Urethral PRN    senna-docusate (PERICOLACE) 8.6-50 mg per tablet 1 Tab  1 Tab Oral DAILY    epoetin yvonne-epbx (RETACRIT) injection 20,000 Units  20,000 Units SubCUTAneous Q MON, WED & FRI    levETIRAcetam (KEPPRA) tablet 125 mg  125 mg Oral BID    dronabinol (MARINOL) capsule 2.5 mg  2.5 mg Oral DAILY    polyethylene glycol (MIRALAX) packet 17 g  17 g Oral DAILY    albuterol-ipratropium (DUO-NEB) 2.5 MG-0.5 MG/3 ML  3 mL Nebulization Q6H PRN    therapeutic multivitamin (THERAGRAN) tablet 1 Tab  1 Tab Oral DAILY    escitalopram oxalate (LEXAPRO) tablet 10 mg  10 mg Oral QPM    potassium chloride SR (KLOR-CON 10) tablet 40 mEq  40 mEq Oral DAILY    alteplase (CATHFLO) 1 mg in sterile water (preservative free) 1 mL injection  1 mg InterCATHeter PRN    finasteride (PROSCAR) tablet 5 mg  5 mg Oral DAILY    spironolactone (ALDACTONE) tablet 25 mg  25 mg Oral DAILY    clonazePAM (KlonoPIN) tablet 0.5 mg  0.5 mg Oral TID PRN    diphenhydrAMINE (BENADRYL) capsule 25 mg  25 mg Oral QHS PRN    octreotide (SANDOSTATIN) injection 25 mcg  25 mcg SubCUTAneous TID    benzocaine-menthol (CEPACOL) lozenge 1 Lozenge  1 Lozenge Mucous Membrane PRN    digoxin (LANOXIN) tablet 0.0625 mg  62.5 mcg Oral Q MON, WED & FRI    pantoprazole (PROTONIX) tablet 40 mg  40 mg Oral ACB    allopurinol (ZYLOPRIM) tablet 100 mg  100 mg Oral DAILY    arformoterol (BROVANA) neb solution 15 mcg  15 mcg Nebulization BID RT    And    budesonide (PULMICORT) 500 mcg/2 ml nebulizer suspension  500 mcg Nebulization BID RT    artificial saliva (MOUTH KOTE) 1 Spray  1 Spray Oral PRN    lactobac ac& pc-s.therm-b.anim (TIAN Q/RISAQUAD)  1 Cap Oral DAILY    oxyCODONE IR (ROXICODONE) tablet 5 mg  5 mg Oral Q6H PRN    tamsulosin (FLOMAX) capsule 0.4 mg  0.4 mg Oral DAILY    insulin lispro (HUMALOG) injection   SubCUTAneous AC&HS    Warfarin MD/NP dosing   Other PRN    sodium chloride (NS) flush 5-40 mL  5-40 mL IntraVENous Q8H    sodium chloride (NS) flush 5-40 mL  5-40 mL IntraVENous PRN    acetaminophen (TYLENOL) tablet 650 mg  650 mg Oral Q6H PRN    naloxone (NARCAN) injection 0.4 mg  0.4 mg IntraVENous PRN    ondansetron (ZOFRAN) injection 4 mg  4 mg IntraVENous Q4H PRN    bisacodyl (DULCOLAX) tablet 5 mg  5 mg Oral DAILY PRN    sucralfate (CARAFATE) tablet 1 g  1 g Oral AC&HS    influenza vaccine 2019-20 (6 mos+)(PF) (FLUARIX/FLULAVAL/FLUZONE QUAD) injection 0.5 mL  0.5 mL IntraMUSCular PRIOR TO DISCHARGE    hydrALAZINE (APRESOLINE) 20 mg/mL injection 20 mg  20 mg IntraVENous Q6H PRN    dextrose 10 % infusion 125-250 mL  125-250 mL IntraVENous PRN         Thank you for allowing me to participate in this patient's care.     Salma Gonzales MD  [address] [place] [address], [address]  Phone: [phone]  Fax: [phone]

## 2020-01-01 NOTE — PROGRESS NOTES
1930: Bedside shift change report given to Isaac Palmer (oncoming nurse) by Lee Glover (offgoing nurse). Report included the following information SBAR, Kardex, Intake/Output, MAR, Recent Results, and Cardiac Rhythm paced . Problem: Falls - Risk of  Goal: *Absence of Falls  Description  Document Lee Crouchjese Fall Risk and appropriate interventions in the flowsheet. Outcome: Progressing Towards Goal  Note: Fall Risk Interventions:  Mobility Interventions: Communicate number of staff needed for ambulation/transfer, OT consult for ADLs, Patient to call before getting OOB, PT Consult for mobility concerns, Utilize gait belt for transfers/ambulation    Mentation Interventions: Adequate sleep, hydration, pain control, Door open when patient unattended, Increase mobility, More frequent rounding, Reorient patient, Room close to nurse's station, Toileting rounds, Update white board    Medication Interventions: Patient to call before getting OOB, Teach patient to arise slowly, Assess postural VS orthostatic hypotension    Elimination Interventions: Call light in reach, Patient to call for help with toileting needs    History of Falls Interventions: Consult care management for discharge planning, Door open when patient unattended, Evaluate medications/consider consulting pharmacy, Investigate reason for fall, Room close to nurse's station, Utilize gait belt for transfer/ambulation, Assess for delayed presentation/identification of injury for 48 hrs (comment for end date)         Problem: Pressure Injury - Risk of  Goal: *Prevention of pressure injury  Description  Document Mich Scale and appropriate interventions in the flowsheet. Outcome: Progressing Towards Goal  Note: Pressure Injury Interventions:  Sensory Interventions: Assess changes in LOC, Assess need for specialty bed, Float heels, Keep linens dry and wrinkle-free, Pressure redistribution bed/mattress (bed type), Turn and reposition approx.  every two hours (pillows and wedges if needed)    Moisture Interventions: Check for incontinence Q2 hours and as needed, Absorbent underpads, Internal/External urinary devices    Activity Interventions: Increase time out of bed, Pressure redistribution bed/mattress(bed type), PT/OT evaluation    Mobility Interventions: Pressure redistribution bed/mattress (bed type), PT/OT evaluation    Nutrition Interventions: Document food/fluid/supplement intake, Offer support with meals,snacks and hydration    Friction and Shear Interventions: Apply protective barrier, creams and emollients, Feet elevated on foot rest, Foam dressings/transparent film/skin sealants, HOB 30 degrees or less, Lift sheet, Lift team/patient mobility team, Minimize layers, Sit at 90-degree angle                Problem: LVAD: Discharge Outcomes  Goal: *Hemodynamically stable  Outcome: Progressing Towards Goal  Note:   VSS. MAP in 70s. Received albumin x2 today. Meds adjusted. Goal: *Lungs clear or at baseline  Outcome: Progressing Towards Goal  Note:   Lung sounds are diminshed. On 3 L; 1L necessary, but increased for comfort. Goal: *Optimal pain control at patient's stated goal  Outcome: Progressing Towards Goal  Note:   No c/o pain currently. Ditropan used for bladder spasms.

## 2020-01-01 NOTE — PROGRESS NOTES
[facility]   [address] [facility], [address] [place] [address], [facility]  Phone: [phone]   [phone]       Nephrology Progress Note  Aaron Martinez     [birth year]     [id number]  Date of Admission : 10/25/2019  01/01/20    CC: Follow up for JUAN J, CKD, Hyponatremia      Assessment and Plan   JUAN J on CKD:  - resolving and stable  - Cr stable  - no changes    Pseudomonal UTI:  - from cultures on 12/29  - abx per ID    Orthostatic hypotension w/ syncope:  - holding diuretics for now    Hyponatremia :  - Na stable at 130  - daily Na levels for now    Gross hematuria, BPH w/ retention:  - off CBI pre VAD. 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 CT in place    L arm clot s/p thrombectomy on 11/25    JOSE on CPAP      PAF s/p DCCV 6/19     Anemia:  - from blood loss s/p multiple blood products  - s/p IV iron,  Repeat iron studies ok  -  IV iron and increased Epogen      Interval History:    Seen and examined. Na and Cr stable. Voiding ok/  Resting now.   No cp or sob reported    Review of Systems: as per HPI    Current Medications:   Current Facility-Administered Medications   Medication Dose Route Frequency    warfarin (COUMADIN) tablet 1 mg  1 mg Oral ONCE    sildenafil (pulm.hypertension) (REVATIO) tablet 10 mg  10 mg Oral TID    oxybutynin (DITROPAN) tablet 5 mg  5 mg Oral Q6H PRN    cefepime (MAXIPIME) 2 g in 0.9% sodium chloride (MBP/ADV) 100 mL  2 g IntraVENous Q24H    [Held by provider] bumetanide (BUMEX) injection 1 mg  1 mg IntraVENous BID    magnesium oxide (MAG-OX) tablet 800 mg  800 mg Oral BID    milrinone (PRIMACOR) 20 MG/100 ML D5W infusion  0.125 mcg/kg/min IntraVENous CONTINUOUS    albumin human 5% (BUMINATE) solution 12.5 g  12.5 g IntraVENous DIALYSIS PRN    lidocaine (URO-JET/ GLYDO) 2 % jelly   Urethral PRN    senna-docusate (PERICOLACE) 8.6-50 mg per tablet 1 Tab  1 Tab Oral DAILY    epoetin yvonne-epbx (RETACRIT) injection 20,000 Units  20,000 Units SubCUTAneous Q MON, WED & FRI    levETIRAcetam (KEPPRA) tablet 125 mg  125 mg Oral BID    dronabinol (MARINOL) capsule 2.5 mg  2.5 mg Oral DAILY    polyethylene glycol (MIRALAX) packet 17 g  17 g Oral DAILY    albuterol-ipratropium (DUO-NEB) 2.5 MG-0.5 MG/3 ML  3 mL Nebulization Q6H PRN    therapeutic multivitamin (THERAGRAN) tablet 1 Tab  1 Tab Oral DAILY    escitalopram oxalate (LEXAPRO) tablet 10 mg  10 mg Oral QPM    potassium chloride SR (KLOR-CON 10) tablet 40 mEq  40 mEq Oral DAILY    alteplase (CATHFLO) 1 mg in sterile water (preservative free) 1 mL injection  1 mg InterCATHeter PRN    finasteride (PROSCAR) tablet 5 mg  5 mg Oral DAILY    spironolactone (ALDACTONE) tablet 25 mg  25 mg Oral DAILY    clonazePAM (KlonoPIN) tablet 0.5 mg  0.5 mg Oral TID PRN    diphenhydrAMINE (BENADRYL) capsule 25 mg  25 mg Oral QHS PRN    octreotide (SANDOSTATIN) injection 25 mcg  25 mcg SubCUTAneous TID    benzocaine-menthol (CEPACOL) lozenge 1 Lozenge  1 Lozenge Mucous Membrane PRN    digoxin (LANOXIN) tablet 0.0625 mg  62.5 mcg Oral Q MON, WED & FRI    pantoprazole (PROTONIX) tablet 40 mg  40 mg Oral ACB    allopurinol (ZYLOPRIM) tablet 100 mg  100 mg Oral DAILY    arformoterol (BROVANA) neb solution 15 mcg  15 mcg Nebulization BID RT    And    budesonide (PULMICORT) 500 mcg/2 ml nebulizer suspension  500 mcg Nebulization BID RT    artificial saliva (MOUTH KOTE) 1 Spray  1 Spray Oral PRN    lactobac ac& pc-s.therm-b.anim (TIAN Q/RISAQUAD)  1 Cap Oral DAILY    oxyCODONE IR (ROXICODONE) tablet 5 mg  5 mg Oral Q6H PRN    tamsulosin (FLOMAX) capsule 0.4 mg  0.4 mg Oral DAILY    insulin lispro (HUMALOG) injection   SubCUTAneous AC&HS    Warfarin MD/NP dosing   Other PRN    sodium chloride (NS) flush 5-40 mL  5-40 mL IntraVENous Q8H    sodium chloride (NS) flush 5-40 mL  5-40 mL IntraVENous PRN    acetaminophen (TYLENOL) tablet 650 mg  650 mg Oral Q6H PRN    naloxone (NARCAN) injection 0.4 mg  0.4 mg IntraVENous PRN    ondansetron (ZOFRAN) injection 4 mg  4 mg IntraVENous Q4H PRN    bisacodyl (DULCOLAX) tablet 5 mg  5 mg Oral DAILY PRN    sucralfate (CARAFATE) tablet 1 g  1 g Oral AC&HS    influenza vaccine 2019-20 (6 mos+)(PF) (FLUARIX/FLULAVAL/FLUZONE QUAD) injection 0.5 mL  0.5 mL IntraMUSCular PRIOR TO DISCHARGE    hydrALAZINE (APRESOLINE) 20 mg/mL injection 20 mg  20 mg IntraVENous Q6H PRN    dextrose 10 % infusion 125-250 mL  125-250 mL IntraVENous PRN      No Known Allergies    Objective:  Vitals:    Vitals:    01/01/20 0408 01/01/20 0809 01/01/20 1028 01/01/20 1118   BP:       Pulse:  84  79   Resp:  18     Temp:  98.1 °F (36.7 °C)  98.6 °F (37 °C)   SpO2:  98% 96% 96%   Weight: 79.7 kg (175 lb 11.3 oz)      Height:         Intake and Output:  01/01 0701 - 01/01 1900  In: 25 [I.V.:25]  Out: 350 [Urine:350]  12/30 1901 - 01/01 0700  In: 2969.8 [P.O.:2180; I.V.:789.8]  Out: 2825 [Urine:2825]    Physical Examination:    General: Elderly man in no acute distress  Neck:  No lines   Resp:  TA  CV:  VAD sounds; No LE edema  GI:  Soft and non-tender; no distension  Neurologic:  Alert and appropriate; normal speech  :  No Lizama    [x]    High complexity decision making was performed  [x]    Patient is at high-risk of decompensation with multiple organ involvement    Lab Data Personally Reviewed: I have reviewed all the pertinent labs, microbiology data and radiology studies during assessment.     Recent Labs     01/01/20  0414 12/31/19  0323 12/30/19  0415   * 130* 128*   K 4.6 4.4 4.1   CL 98 95* 93*   CO2 28 28 29   GLU 96 141* 103*   BUN 21* 20 15   CREA 1.35* 1.59* 1.30   CA 8.7 8.8 9.0   MG 2.0 1.7 1.9   ALB 2.8* 2.8* 2.7*   SGOT 21 21 23   ALT 12 12 18   INR 2.1* 2.1* 1.9*     Recent Labs     01/01/20  0414 12/31/19  1618 12/31/19  1525 12/31/19  0323 12/30/19  1557 12/30/19  0415   WBC 7.6  --   --  8.4  --  6.3   HGB 8.2* 8.0* 6.3* 8.4* 8.3* 7.5*   HCT 26.8* 25.8* 20.5* 27.2* 26.9* 23.9*   *  --   --  140*  --  179     No results found for: SDES  Lab Results   Component Value Date/Time    Culture result: PENDING 12/31/2019 04:18 PM    Culture result: NO GROWTH AFTER 12 HOURS 12/31/2019 03:24 PM    Culture result: PSEUDOMONAS AERUGINOSA (A) 12/29/2019 03:40 PM    Culture result: (A) 12/29/2019 03:40 PM     LACTOSE FERMENTING GRAM NEGATIVE RODS (2,000 COLONIES/mL)    Culture result: NO GROWTH 5 DAYS 12/26/2019 10:23 AM     Recent Results (from the past 24 hour(s))   CULTURE, BLOOD, PAIRED    Collection Time: 12/31/19  3:24 PM   Result Value Ref Range    Special Requests: NO SPECIAL REQUESTS      Culture result: NO GROWTH AFTER 12 HOURS     HGB & HCT    Collection Time: 12/31/19  3:25 PM   Result Value Ref Range    HGB 6.3 (L) 12.1 - 17.0 g/dL    HCT 20.5 (L) 36.6 - 50.3 %   CULTURE, RESPIRATORY/SPUTUM/BRONCH W GRAM STAIN    Collection Time: 12/31/19  4:18 PM   Result Value Ref Range    Special Requests: NO SPECIAL REQUESTS      GRAM STAIN RARE WBCS SEEN      GRAM STAIN RARE EPITHELIAL CELLS SEEN      GRAM STAIN FEW GRAM POSITIVE COCCI IN PAIRS      GRAM STAIN RARE APPARENT YEAST      Culture result: PENDING    HGB & HCT    Collection Time: 12/31/19  4:18 PM   Result Value Ref Range    HGB 8.0 (L) 12.1 - 17.0 g/dL    HCT 25.8 (L) 36.6 - 50.3 %   GLUCOSE, POC    Collection Time: 12/31/19  4:48 PM   Result Value Ref Range    Glucose (POC) 114 (H) 65 - 100 mg/dL    Performed by JESSICA REYES    GLUCOSE, POC    Collection Time: 12/31/19  9:39 PM   Result Value Ref Range    Glucose (POC) 136 (H) 65 - 100 mg/dL    Performed by JESSICA REYES    METABOLIC PANEL, COMPREHENSIVE    Collection Time: 01/01/20  4:14 AM   Result Value Ref Range    Sodium 132 (L) 136 - 145 mmol/L    Potassium 4.6 3.5 - 5.1 mmol/L    Chloride 98 97 - 108 mmol/L    CO2 28 21 - 32 mmol/L    Anion gap 6 5 - 15 mmol/L    Glucose 96 65 - 100 mg/dL    BUN 21 (H) 6 - 20 MG/DL    Creatinine 1.35 (H) 0.70 - 1.30 MG/DL    BUN/Creatinine ratio 16 12 - 20      GFR est AA >60 >60 ml/min/1.73m2    GFR est non-AA 52 (L) >60 ml/min/1.73m2    Calcium 8.7 8.5 - 10.1 MG/DL    Bilirubin, total 1.0 0.2 - 1.0 MG/DL    ALT (SGPT) 12 12 - 78 U/L    AST (SGOT) 21 15 - 37 U/L    Alk.  phosphatase 100 45 - 117 U/L    Protein, total 6.5 6.4 - 8.2 g/dL    Albumin 2.8 (L) 3.5 - 5.0 g/dL    Globulin 3.7 2.0 - 4.0 g/dL    A-G Ratio 0.8 (L) 1.1 - 2.2     MAGNESIUM    Collection Time: 01/01/20  4:14 AM   Result Value Ref Range    Magnesium 2.0 1.6 - 2.4 mg/dL   CBC W/O DIFF    Collection Time: 01/01/20  4:14 AM   Result Value Ref Range    WBC 7.6 4.1 - 11.1 K/uL    RBC 2.79 (L) 4.10 - 5.70 M/uL    HGB 8.2 (L) 12.1 - 17.0 g/dL    HCT 26.8 (L) 36.6 - 50.3 %    MCV 96.1 80.0 - 99.0 FL    MCH 29.4 26.0 - 34.0 PG    MCHC 30.6 30.0 - 36.5 g/dL    RDW 18.2 (H) 11.5 - 14.5 %    PLATELET 273 (L) 457 - 400 K/uL    MPV 9.0 8.9 - 12.9 FL    NRBC 0.0 0  WBC    ABSOLUTE NRBC 0.00 0.00 - 0.01 K/uL   PROTHROMBIN TIME + INR    Collection Time: 01/01/20  4:14 AM   Result Value Ref Range    INR 2.1 (H) 0.9 - 1.1      Prothrombin time 20.4 (H) 9.0 - 11.1 sec   PTT    Collection Time: 01/01/20  4:14 AM   Result Value Ref Range    aPTT 42.9 (H) 22.1 - 32.0 sec    aPTT, therapeutic range     58.0 - 77.0 SECS   NT-PRO BNP    Collection Time: 01/01/20  4:14 AM   Result Value Ref Range    NT pro-BNP 9,674 (H) <125 PG/ML   DIGOXIN    Collection Time: 01/01/20  4:14 AM   Result Value Ref Range    Digoxin level 0.8 (L) 0.90 - 2.00 NG/ML   LD    Collection Time: 01/01/20  4:14 AM   Result Value Ref Range     85 - 241 U/L   LACTIC ACID    Collection Time: 01/01/20  4:14 AM   Result Value Ref Range    Lactic acid 1.2 0.4 - 2.0 MMOL/L   PROCALCITONIN    Collection Time: 01/01/20  4:14 AM   Result Value Ref Range    Procalcitonin 5.10 ng/mL   GLUCOSE, POC    Collection Time: 01/01/20  7:12 AM   Result Value Ref Range    Glucose (POC) 93 65 - 100 mg/dL    Performed by Anabella Solorzano POC    Collection Time: 01/01/20 11:32 AM   Result Value Ref Range    Glucose (POC) 159 (H) 65 - 100 mg/dL    Performed by Lauren Cyr  PCT                  Shalonda Powell MD

## 2020-01-01 NOTE — PROGRESS NOTES
[facility] VAD/Heart Failure Progress Note    Admit Date: 10/25/2019  POD:  37 Days Post-Op    Procedure:  Procedure(s):  LEFT BRACHIAL THROMBECTOMY        Subjective:   Dyspnea, fatigue, and weakness; hypotension at times      Objective:   Vitals:  Blood pressure 91/72, pulse 79, temperature 98.6 °F (37 °C), resp. rate 18, height 6' 2\" (1.88 m), weight 175 lb 11.3 oz (79.7 kg), SpO2 96 %.   Temp (24hrs), Av.1 °F (37.3 °C), Min:98.1 °F (36.7 °C), Max:100.4 °F (38 °C)    Hemodynamics:   CO: CO (l/min): 5.8 l/min   CI: CI (l/min/m2): 2.8 l/min/m2   CVP: CVP (mmHg): 4 mmHg (19 1645)   SVR: SVR (dyne*sec)/cm5: 1080 (dyne*sec)/cm5 (19 1914)   PAP Systolic: PAP Systolic: 34 (40 0383)   PAP Diastolic: PAP Diastolic: 13 (50/37/12 3197)   PVR:     SV02: SVO2 (%): 67 % (19 1300)   SCV02: SCVO2 (%): 75 % (10/29/19 1900)    VAD Interrogation: LVAD (Heartmate)  Pump Speed (RPM): 6400  Pump Flow (LPM): 5.8  PI (Pulsitility Index): 3.7  Power: 5.6  MAP: 76   Test: No  Back Up  at Bedside & Labeled: Yes  Power Module Test: No  Driveline Site Care: No  Driveline Dressing: Clean, Dry, and Intact    EKG/Rhythm:      Extubation Date / Time:     CT Output:     Ventilator:  Ventilator Volumes  Vt Set (ml): 550 ml (19 08)  Vt Exhaled (Machine Breath) (ml): 639 ml (19 08)  Vt Spont (ml): 437 ml (19 1035)  Ve Observed (l/min): 7.25 l/min (19 1035)    Oxygen Therapy:  Oxygen Therapy  O2 Sat (%): 96 % (20 111)  Pulse via Oximetry: 85 beats per minute (20 1028)  O2 Device: Nasal cannula (20)  PEEP/CPAP (cm H2O): 3 cm H20 (19 1504)  O2 Flow Rate (L/min): 1 l/min (20 102)  FIO2 (%): 21 % (19 0823)    CXR:    Admission Weight: Last Weight   Weight: 192 lb 10.9 oz (87.4 kg) Weight: 175 lb 11.3 oz (79.7 kg)     Intake / Output / Drain:  Current Shift:  07 -  1900  In: 379 [P.O.:480; I.V.:25]  Out: 600 [Urine:600]  Last 24 hrs.:     Intake/Output Summary (Last 24 hours) at 1/1/2020 1202  Last data filed at 1/1/2020 1118  Gross per 24 hour   Intake 2238.7 ml   Output 2025 ml   Net 213.7 ml             No results for input(s): CPK, CKMB, TROIQ in the last 72 hours. Recent Labs     01/01/20  0414 12/31/19  1618 12/31/19  1525 12/31/19  0323  12/30/19  0415   *  --   --  130*  --  128*   K 4.6  --   --  4.4  --  4.1   CO2 28  --   --  28  --  29   BUN 21*  --   --  20  --  15   CREA 1.35*  --   --  1.59*  --  1.30   GLU 96  --   --  141*  --  103*   MG 2.0  --   --  1.7  --  1.9   WBC 7.6  --   --  8.4  --  6.3   HGB 8.2* 8.0* 6.3* 8.4*   < > 7.5*   HCT 26.8* 25.8* 20.5* 27.2*   < > 23.9*   *  --   --  140*  --  179    < > = values in this interval not displayed.      Recent Labs     01/01/20  0414 12/31/19  0323 12/30/19  0415   INR 2.1* 2.1* 1.9*   PTP 20.4* 20.8* 19.0*   APTT 42.9* 40.7* 40.0*     No lab exists for component: PBNP        Current Facility-Administered Medications:     warfarin (COUMADIN) tablet 1 mg, 1 mg, Oral, ONCE, Mounika Altman MD    sildenafil (pulm.hypertension) (REVATIO) tablet 10 mg, 10 mg, Oral, TID, Phyllis ACUNA, NP, 10 mg at 01/01/20 0900    oxybutynin (DITROPAN) tablet 5 mg, 5 mg, Oral, Q6H PRN, Shana Sims, NP, 5 mg at 12/31/19 1137    cefepime (MAXIPIME) 2 g in 0.9% sodium chloride (MBP/ADV) 100 mL, 2 g, IntraVENous, Q24H, Ar Olivares MD, Last Rate: 200 mL/hr at 12/31/19 1540, 2 g at 12/31/19 1540    [Held by provider] bumetanide (BUMEX) injection 1 mg, 1 mg, IntraVENous, BID, Phyllis ACUNA NP, 1 mg at 12/30/19 1205    magnesium oxide (MAG-OX) tablet 800 mg, 800 mg, Oral, BID, Phyllis ACUNA NP, 800 mg at 01/01/20 0912    milrinone (PRIMACOR) 20 MG/100 ML D5W infusion, 0.125 mcg/kg/min, IntraVENous, CONTINUOUS, Shana Sims NP, Last Rate: 2.8 mL/hr at 12/31/19 1328, 0.125 mcg/kg/min at 12/31/19 1328    albumin human 5% (BUMINATE) solution 12.5 g, 12.5 g, IntraVENous, DIALYSIS PRN, Logan Kincaid MD    lidocaine (URO-JET/ GLYDO) 2 % jelly, , Urethral, PRN, Mary Ellen Altman MD    senna-docusate (PERICOLACE) 8.6-50 mg per tablet 1 Tab, 1 Tab, Oral, DAILY, Polliard, Bipin Suh, NP, 1 Tab at 01/01/20 0912    epoetin yvonne-epbx (RETACRIT) injection 20,000 Units, 20,000 Units, SubCUTAneous, Q MON, WED & FRI, Logan Kincaid MD, 20,000 Units at 12/30/19 2123    levETIRAcetam (KEPPRA) tablet 125 mg, 125 mg, Oral, BID, Polliard, Bipin Suh, NP, 125 mg at 01/01/20 0911    dronabinol (MARINOL) capsule 2.5 mg, 2.5 mg, Oral, DAILY, Polliard, Oksana Escort T, NP, 2.5 mg at 12/30/19 1518    polyethylene glycol (MIRALAX) packet 17 g, 17 g, Oral, DAILY, Polliard, Oksana Escort T, NP, 17 g at 01/01/20 0911    albuterol-ipratropium (DUO-NEB) 2.5 MG-0.5 MG/3 ML, 3 mL, Nebulization, Q6H PRN, Phil Gaytan MD, 3 mL at 12/25/19 1407    therapeutic multivitamin (THERAGRAN) tablet 1 Tab, 1 Tab, Oral, DAILY, Levi, Shana B, NP, 1 Tab at 01/01/20 0912    escitalopram oxalate (LEXAPRO) tablet 10 mg, 10 mg, Oral, QPM, Mounika Altman MD, 10 mg at 12/31/19 1709    potassium chloride SR (KLOR-CON 10) tablet 40 mEq, 40 mEq, Oral, DAILY, Levi, Shana B, NP, 40 mEq at 01/01/20 0911    alteplase (CATHFLO) 1 mg in sterile water (preservative free) 1 mL injection, 1 mg, InterCATHeter, PRN, Mounika Mclean MD, 1 mg at 12/31/19 1121    finasteride (PROSCAR) tablet 5 mg, 5 mg, Oral, DAILY, Tosha Merino MD, 5 mg at 01/01/20 0900    spironolactone (ALDACTONE) tablet 25 mg, 25 mg, Oral, DAILY, Mounika Altman MD, 25 mg at 01/01/20 0900    clonazePAM (KlonoPIN) tablet 0.5 mg, 0.5 mg, Oral, TID PRN, Mounika Mclean MD, 0.5 mg at 12/31/19 1328    diphenhydrAMINE (BENADRYL) capsule 25 mg, 25 mg, Oral, QHS PRN, Rin Herrera NP, 25 mg at 12/31/19 1819    octreotide (SANDOSTATIN) injection 25 mcg, 25 mcg, SubCUTAneous, TID, Mariela Herrera NP, 25 mcg at 01/01/20 0912    benzocaine-menthol (CEPACOL) lozenge 1 Lozenge, 1 Lozenge, Mucous Membrane, PRN, Mariela Herrera NP    digoxin (LANOXIN) tablet 0.0625 mg, 62.5 mcg, Oral, Q MON, WED & FRI, Mariela Herrera NP, 0.0625 mg at 12/30/19 2121    pantoprazole (PROTONIX) tablet 40 mg, 40 mg, Oral, ACB, Mariela Herrera, NP, 40 mg at 01/01/20 0718    allopurinol (ZYLOPRIM) tablet 100 mg, 100 mg, Oral, DAILY, Nino Herrera NP, 100 mg at 01/01/20 0912    arformoterol (BROVANA) neb solution 15 mcg, 15 mcg, Nebulization, BID RT, 15 mcg at 01/01/20 1027 **AND** budesonide (PULMICORT) 500 mcg/2 ml nebulizer suspension, 500 mcg, Nebulization, BID RT, Nino Herrera, NP, 500 mcg at 01/01/20 1028    artificial saliva (MOUTH KOTE) 1 Spray, 1 Spray, Oral, PRN, Mariela Herrera NP, 1 Spray at 12/12/19 1452    lactobac ac& pc-s.therm-b.anim (TIAN Q/RISAQUAD), 1 Cap, Oral, DAILY, Tae Santoro MD, 1 Cap at 01/01/20 0911    oxyCODONE IR (ROXICODONE) tablet 5 mg, 5 mg, Oral, Q6H PRN, Stephanie Snider MD, 5 mg at 12/30/19 0405    tamsulosin (FLOMAX) capsule 0.4 mg, 0.4 mg, Oral, DAILY, Shraddha Byrnes MD, 0.4 mg at 01/01/20 0900    insulin lispro (HUMALOG) injection, , SubCUTAneous, AC&HS, Shana Sims, NP, Stopped at 12/30/19 2200    Warfarin MD/NP dosing, , Other, PRN, Mounika Altman MD    sodium chloride (NS) flush 5-40 mL, 5-40 mL, IntraVENous, Q8H, Harshal Vallejo MD, 10 mL at 01/01/20 0718    sodium chloride (NS) flush 5-40 mL, 5-40 mL, IntraVENous, PRN, Stephanie Snider MD, 20 mL at 12/29/19 1039    acetaminophen (TYLENOL) tablet 650 mg, 650 mg, Oral, Q6H PRN, Stephanie Snider MD, 650 mg at 12/31/19 1858    naloxone [facility]) injection 0.4 mg, 0.4 mg, IntraVENous, PRN, Stephanie Snider MD    ondansetron [facility]) injection 4 mg, 4 mg, IntraVENous, Q4H PRN, Stephanie Snider MD, 4 mg at 12/30/19 0643    bisacodyl (DULCOLAX) tablet 5 mg, 5 mg, Oral, DAILY PRN, Kathie Bermudez MD, 5 mg at 12/22/19 1533    sucralfate (CARAFATE) tablet 1 g, 1 g, Oral, AC&HS, Kathie Bermudez MD, 1 g at 01/01/20 1115    influenza vaccine 2019-20 (6 mos+)(PF) (FLUARIX/FLULAVAL/FLUZONE QUAD) injection 0.5 mL, 0.5 mL, IntraMUSCular, PRIOR TO DISCHARGE, Rigoberto Altman MD    hydrALAZINE (APRESOLINE) 20 mg/mL injection 20 mg, 20 mg, IntraVENous, Q6H PRN, Shana Sims NP, 20 mg at 12/10/19 0541    dextrose 10 % infusion 125-250 mL, 125-250 mL, IntraVENous, PRN, Mounika Carpenter MD, Stopped at 11/18/19 0700    A/P     S/P LVAD - good flows  Need for anti-coagulation - coumadin  DM - insulin  RV dysfunction - milrinone, diuretics      Risk of morbidity and mortality - high  Medical decision making - high complexity       Signed By: Patrick Flores MD

## 2020-01-01 NOTE — PROGRESS NOTES
0730:  Bedside shift change report given to Jae Becker, RNs (oncoming nurse) by [facility], RN (offgoing nurse). Report included the following information SBAR, Kardex, Procedure Summary, Intake/Output, MAR, and Recent Results. Preceptor Review of RN Work    1/1/2020  - Shift times - 0730 to 2000    The RN documentation of patient care for Nickie Reddy has been reviewed and approved. All medications have been administered under the direct supervision of the preceptor. Nisha Post, KATRIN     4234:  RN observed Jen Urrutia RN perform driveline dressing change under sterile technique. 1930:  Bedside shift change report given to [facility], RN (oncoming nurse) by Jae Becker RN (offgoing nurse). Report included the following information SBAR, Kardex, Procedure Summary, Intake/Output, MAR and Recent Results. Problem: Falls - Risk of  Goal: *Absence of Falls  Description  Document Catherene Bunting Fall Risk and appropriate interventions in the flowsheet.   Outcome: Progressing Towards Goal  Note: Fall Risk Interventions:  Mobility Interventions: Communicate number of staff needed for ambulation/transfer, OT consult for ADLs, Patient to call before getting OOB, PT Consult for mobility concerns, Utilize gait belt for transfers/ambulation    Mentation Interventions: Adequate sleep, hydration, pain control, Door open when patient unattended, Increase mobility, More frequent rounding, Reorient patient, Room close to nurse's station, Toileting rounds, Update white board    Medication Interventions: Patient to call before getting OOB, Teach patient to arise slowly    Elimination Interventions: Call light in reach, Patient to call for help with toileting needs    History of Falls Interventions: Consult care management for discharge planning, Door open when patient unattended, Evaluate medications/consider consulting pharmacy, Investigate reason for fall, Room close to nurse's station, Utilize gait belt for transfer/ambulation, Assess for delayed presentation/identification of injury for 48 hrs (comment for end date)         Problem: Pain  Goal: *Control of Pain  Outcome: Progressing Towards Goal     Problem: Pressure Injury - Risk of  Goal: *Prevention of pressure injury  Description  Document Mich Scale and appropriate interventions in the flowsheet. Outcome: Progressing Towards Goal  Note: Pressure Injury Interventions:  Sensory Interventions: Assess changes in LOC, Assess need for specialty bed, Float heels, Keep linens dry and wrinkle-free, Pressure redistribution bed/mattress (bed type), Turn and reposition approx.  every two hours (pillows and wedges if needed)    Moisture Interventions: Check for incontinence Q2 hours and as needed, Absorbent underpads, Internal/External urinary devices    Activity Interventions: Increase time out of bed, Pressure redistribution bed/mattress(bed type), PT/OT evaluation    Mobility Interventions: Pressure redistribution bed/mattress (bed type), PT/OT evaluation    Nutrition Interventions: Document food/fluid/supplement intake, Offer support with meals,snacks and hydration    Friction and Shear Interventions: Apply protective barrier, creams and emollients, Feet elevated on foot rest, Foam dressings/transparent film/skin sealants, HOB 30 degrees or less, Lift sheet, Lift team/patient mobility team, Minimize layers, Sit at 90-degree angle                Problem: LVAD Post-op Day 15 to Discharge  Goal: Nutrition/Diet  Outcome: Progressing Towards Goal  Goal: Medications  Outcome: Progressing Towards Goal     Problem: Impaired Skin Integrity/Pressure Injury Treatment  Goal: *Improvement of Existing Pressure Injury  Outcome: Progressing Towards Goal

## 2020-01-01 NOTE — PROGRESS NOTES
[facility] VAD/Heart Failure Progress Note    Admit Date: 10/25/2019  POD:  37 Days Post-Op    Procedure:  Procedure(s):  LEFT BRACHIAL THROMBECTOMY        Subjective:   Dyspnea, fatigue, and weakness; bladder spasms     Objective:   Vitals:  Blood pressure 91/72, pulse 79, temperature 98.6 °F (37 °C), resp. rate 18, height 6' 2\" (1.88 m), weight 175 lb 11.3 oz (79.7 kg), SpO2 96 %.   Temp (24hrs), Av.1 °F (37.3 °C), Min:98.1 °F (36.7 °C), Max:100.4 °F (38 °C)    Hemodynamics:   CO: CO (l/min): 5.8 l/min   CI: CI (l/min/m2): 2.8 l/min/m2   CVP: CVP (mmHg): 4 mmHg (19 1645)   SVR: SVR (dyne*sec)/cm5: 1080 (dyne*sec)/cm5 (19 5474)   PAP Systolic: PAP Systolic: 34 (71 6786)   PAP Diastolic: PAP Diastolic: 13 ( 6624)   PVR:     SV02: SVO2 (%): 67 % (19 1300)   SCV02: SCVO2 (%): 75 % (10/29/19 1900)    VAD Interrogation: LVAD (Heartmate)  Pump Speed (RPM): 6400  Pump Flow (LPM): 5.8  PI (Pulsitility Index): 3.7  Power: 5.6  MAP: 76   Test: No  Back Up  at Bedside & Labeled: Yes  Power Module Test: No  Driveline Site Care: No  Driveline Dressing: Clean, Dry, and Intact    EKG/Rhythm:      Extubation Date / Time:     CT Output:     Ventilator:  Ventilator Volumes  Vt Set (ml): 550 ml (19 08)  Vt Exhaled (Machine Breath) (ml): 639 ml (19 08)  Vt Spont (ml): 437 ml (19 1035)  Ve Observed (l/min): 7.25 l/min (19 1035)    Oxygen Therapy:  Oxygen Therapy  O2 Sat (%): 96 % (20 111)  Pulse via Oximetry: 85 beats per minute (20 1028)  O2 Device: Nasal cannula (20)  PEEP/CPAP (cm H2O): 3 cm H20 (19 1504)  O2 Flow Rate (L/min): 1 l/min (20 102)  FIO2 (%): 21 % (19 0823)    CXR:    Admission Weight: Last Weight   Weight: 192 lb 10.9 oz (87.4 kg) Weight: 175 lb 11.3 oz (79.7 kg)     Intake / Output / Drain:  Current Shift:  07 -  1900  In: 856 [P.O.:480; I.V.:25]  Out: 600 [Urine:600]  Last 24 hrs.:     Intake/Output Summary (Last 24 hours) at 1/1/2020 1158  Last data filed at 1/1/2020 1118  Gross per 24 hour   Intake 2238.7 ml   Output 2025 ml   Net 213.7 ml           No results for input(s): CPK, CKMB, TROIQ in the last 72 hours. Recent Labs     01/01/20  0414 12/31/19  1618 12/31/19  1525 12/31/19  0323  12/30/19  0415   *  --   --  130*  --  128*   K 4.6  --   --  4.4  --  4.1   CO2 28  --   --  28  --  29   BUN 21*  --   --  20  --  15   CREA 1.35*  --   --  1.59*  --  1.30   GLU 96  --   --  141*  --  103*   MG 2.0  --   --  1.7  --  1.9   WBC 7.6  --   --  8.4  --  6.3   HGB 8.2* 8.0* 6.3* 8.4*   < > 7.5*   HCT 26.8* 25.8* 20.5* 27.2*   < > 23.9*   *  --   --  140*  --  179    < > = values in this interval not displayed.      Recent Labs     01/01/20 0414 12/31/19 0323 12/30/19  0415   INR 2.1* 2.1* 1.9*   PTP 20.4* 20.8* 19.0*   APTT 42.9* 40.7* 40.0*     No lab exists for component: PBNP        Current Facility-Administered Medications:     warfarin (COUMADIN) tablet 1 mg, 1 mg, Oral, ONCE, Mounika Altman MD    sildenafil (pulm.hypertension) (REVATIO) tablet 10 mg, 10 mg, Oral, TID, Ailyn ACUNA NP, 10 mg at 01/01/20 0900    oxybutynin (DITROPAN) tablet 5 mg, 5 mg, Oral, Q6H PRN, Shana Sims NP, 5 mg at 12/31/19 1137    cefepime (MAXIPIME) 2 g in 0.9% sodium chloride (MBP/ADV) 100 mL, 2 g, IntraVENous, Q24H, Shannan Olivares MD, Last Rate: 200 mL/hr at 12/31/19 1540, 2 g at 12/31/19 1540    [Held by provider] bumetanide (BUMEX) injection 1 mg, 1 mg, IntraVENous, BID, Ailyn ACUNA NP, 1 mg at 12/30/19 5658    magnesium oxide (MAG-OX) tablet 800 mg, 800 mg, Oral, BID, Ailyn ACUNA NP, 800 mg at 01/01/20 0912    milrinone (PRIMACOR) 20 MG/100 ML D5W infusion, 0.125 mcg/kg/min, IntraVENous, CONTINUOUS, Shana Sims NP, Last Rate: 2.8 mL/hr at 12/31/19 1328, 0.125 mcg/kg/min at 12/31/19 1328    albumin human 5% (BUMINATE) solution 12.5 g, 12.5 g, IntraVENous, DIALYSIS PRN, Logan Kincaid MD    lidocaine (URO-JET/ GLYDO) 2 % jelly, , Urethral, PRN, Martin Altman MD    senna-docusate (PERICOLACE) 8.6-50 mg per tablet 1 Tab, 1 Tab, Oral, DAILY, Polliard, Stacey Moat, NP, 1 Tab at 01/01/20 0912    epoetin yvonne-epbx (RETACRIT) injection 20,000 Units, 20,000 Units, SubCUTAneous, Q MON, WED & FRI, Eloise Kincaid MD, 20,000 Units at 12/30/19 2123    levETIRAcetam (KEPPRA) tablet 125 mg, 125 mg, Oral, BID, Polliard, Stacey Moat, NP, 125 mg at 01/01/20 0911    dronabinol (MARINOL) capsule 2.5 mg, 2.5 mg, Oral, DAILY, Polliard, Kalyan Pipes T, NP, 2.5 mg at 12/30/19 1518    polyethylene glycol (MIRALAX) packet 17 g, 17 g, Oral, DAILY, Polliard, Kalyan Pipes T, NP, 17 g at 01/01/20 0911    albuterol-ipratropium (DUO-NEB) 2.5 MG-0.5 MG/3 ML, 3 mL, Nebulization, Q6H PRN, Verner Jewel, MD, 3 mL at 12/25/19 1407    therapeutic multivitamin (THERAGRAN) tablet 1 Tab, 1 Tab, Oral, DAILY, Levi, Shana B, NP, 1 Tab at 01/01/20 0912    escitalopram oxalate (LEXAPRO) tablet 10 mg, 10 mg, Oral, QPM, Mounika Altman MD, 10 mg at 12/31/19 1709    potassium chloride SR (KLOR-CON 10) tablet 40 mEq, 40 mEq, Oral, DAILY, Levi, Shana B, NP, 40 mEq at 01/01/20 0911    alteplase (CATHFLO) 1 mg in sterile water (preservative free) 1 mL injection, 1 mg, InterCATHeter, PRN, Mounika Mead MD, 1 mg at 12/31/19 1121    finasteride (PROSCAR) tablet 5 mg, 5 mg, Oral, DAILY, Melany Lozano MD, 5 mg at 01/01/20 0900    spironolactone (ALDACTONE) tablet 25 mg, 25 mg, Oral, DAILY, Mounika Altman MD, 25 mg at 01/01/20 0900    clonazePAM (KlonoPIN) tablet 0.5 mg, 0.5 mg, Oral, TID PRN, Mounika Mead MD, 0.5 mg at 12/31/19 1328    diphenhydrAMINE (BENADRYL) capsule 25 mg, 25 mg, Oral, QHS PRN, Rin Herrera NP, 25 mg at 12/31/19 1819    octreotide (SANDOSTATIN) injection 25 mcg, 25 mcg, SubCUTAneous, TID, Sharon, Josh Reynolds NP, 25 mcg at 01/01/20 0912    benzocaine-menthol (CEPACOL) lozenge 1 Lozenge, 1 Lozenge, Mucous Membrane, PRN, Josh Herrera NP    digoxin (LANOXIN) tablet 0.0625 mg, 62.5 mcg, Oral, Q MON, WED & FRI, Sharon, Josh Reynolds NP, 0.0625 mg at 12/30/19 2121    pantoprazole (PROTONIX) tablet 40 mg, 40 mg, Oral, ACB, Sharon, Josh Reynolds NP, 40 mg at 01/01/20 0718    allopurinol (ZYLOPRIM) tablet 100 mg, 100 mg, Oral, DAILY, Polliard, Salina Lunch T, NP, 100 mg at 01/01/20 0912    arformoterol (BROVANA) neb solution 15 mcg, 15 mcg, Nebulization, BID RT, 15 mcg at 01/01/20 1027 **AND** budesonide (PULMICORT) 500 mcg/2 ml nebulizer suspension, 500 mcg, Nebulization, BID RT, Katelynnd, Salina Lunch T, NP, 500 mcg at 01/01/20 1028    artificial saliva (MOUTH KOTE) 1 Spray, 1 Spray, Oral, PRN, Josh Herrera NP, 1 Spray at 12/12/19 1452    lactobac ac& pc-s.therm-b.anim (TIAN Q/RISAQUAD), 1 Cap, Oral, DAILY, Geovanna Lee MD, 1 Cap at 01/01/20 0911    oxyCODONE IR (ROXICODONE) tablet 5 mg, 5 mg, Oral, Q6H PRN, Madelyn Lantigua MD, 5 mg at 12/30/19 0405    tamsulosin (FLOMAX) capsule 0.4 mg, 0.4 mg, Oral, DAILY, Aleen Councilman, MD, 0.4 mg at 01/01/20 0900    insulin lispro (HUMALOG) injection, , SubCUTAneous, AC&HS, Shana Sims, NP, Stopped at 12/30/19 2200    Warfarin MD/NP dosing, , Other, PRN, Mounika Altman MD    sodium chloride (NS) flush 5-40 mL, 5-40 mL, IntraVENous, Q8H, Dock Harshal Rojo MD, 10 mL at 01/01/20 0718    sodium chloride (NS) flush 5-40 mL, 5-40 mL, IntraVENous, PRN, Madelyn Lantigua MD, 20 mL at 12/29/19 1039    acetaminophen (TYLENOL) tablet 650 mg, 650 mg, Oral, Q6H PRN, Madelyn Lantigua MD, 650 mg at 12/31/19 1858    naloxone [facility]) injection 0.4 mg, 0.4 mg, IntraVENous, PRN, Madelyn Lantigua MD    ondansetron [facility]) injection 4 mg, 4 mg, IntraVENous, Q4H PRN, Madelyn Lantigua MD, 4 mg at 12/30/19 0643    bisacodyl (DULCOLAX) tablet 5 mg, 5 mg, Oral, DAILY PRN, Madelyn Lantigua MD, 5 mg at 12/22/19 1533    sucralfate (CARAFATE) tablet 1 g, 1 g, Oral, AC&HS, Madelyn Lantigua MD, 1 g at 01/01/20 1115    influenza vaccine 2019-20 (6 mos+)(PF) (FLUARIX/FLULAVAL/FLUZONE QUAD) injection 0.5 mL, 0.5 mL, IntraMUSCular, PRIOR TO DISCHARGE, Amanda Altman MD    hydrALAZINE (APRESOLINE) 20 mg/mL injection 20 mg, 20 mg, IntraVENous, Q6H PRN, Shana Sims NP, 20 mg at 12/10/19 0541    dextrose 10 % infusion 125-250 mL, 125-250 mL, IntraVENous, PRN, Mounika Saravia MD, Stopped at 11/18/19 0700    A/P     S/P LVAD - good flows  Need for anti-coagulation - coumadin  DM - insulin  RV dysfunction - milrinone, diuretics      Risk of morbidity and mortality - high  Medical decision making - high complexity       Signed By: Usama Cordon MD

## 2020-01-02 NOTE — PROGRESS NOTES
[facility]   [address] [facility], [address], [facility]  Phone: [phone]   [phone]       Nephrology Progress Note  Fiona Lama     [birth year]     [id number]  Date of Admission : 10/25/2019  01/02/20    CC: Follow up for JUAN J, CKD, Hyponatremia      Assessment and Plan   JUAN J on CKD:  - resolving and stable  - Cr stable  - no changes  - holding diuretics due to orthostasis and syncope    GNR bacteremia:  - likely urinary source    Pseudomonal UTI:  - from cultures on 12/29  - abx per ID    Orthostatic hypotension w/ syncope:  - holding diuretics for now    Hyponatremia :  - Na stable at 130  - daily Na levels for now    Gross hematuria, BPH w/ retention:  - off CBI pre VAD. 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 CT in place    L arm clot s/p thrombectomy on 11/25    JOSE on CPAP      PAF s/p DCCV 6/19     Anemia:  - from blood loss s/p multiple blood products  - s/p IV iron and PAVEL     Interval History:    Seen and examined. Na down slightly, BP stable. Now with GNR in blood cx from 12/31. .  No cp or sob reported    Review of Systems: as per HPI    Current Medications:   Current Facility-Administered Medications   Medication Dose Route Frequency    warfarin (COUMADIN) tablet 2 mg  2 mg Oral ONCE    venlafaxine-SR (EFFEXOR-XR) capsule 75 mg  75 mg Oral DAILY WITH BREAKFAST    midodrine (PROAMITINE) tablet 2.5 mg  2.5 mg Oral BID WITH MEALS    sildenafil (pulm.hypertension) (REVATIO) tablet 10 mg  10 mg Oral TID    oxybutynin (DITROPAN) tablet 5 mg  5 mg Oral Q6H PRN    cefepime (MAXIPIME) 2 g in 0.9% sodium chloride (MBP/ADV) 100 mL  2 g IntraVENous Q24H    [Held by provider] bumetanide (BUMEX) injection 1 mg  1 mg IntraVENous BID    magnesium oxide (MAG-OX) tablet 800 mg  800 mg Oral BID    [Held by provider] milrinone (PRIMACOR) 20 MG/100 ML D5W infusion  0.125 mcg/kg/min IntraVENous CONTINUOUS    albumin human 5% (BUMINATE) solution 12.5 g  12.5 g IntraVENous DIALYSIS PRN    lidocaine (URO-JET/ GLYDO) 2 % jelly   Urethral PRN    senna-docusate (PERICOLACE) 8.6-50 mg per tablet 1 Tab  1 Tab Oral DAILY    epoetin yvonne-epbx (RETACRIT) injection 20,000 Units  20,000 Units SubCUTAneous Q MON, WED & FRI    levETIRAcetam (KEPPRA) tablet 125 mg  125 mg Oral BID    dronabinol (MARINOL) capsule 2.5 mg  2.5 mg Oral DAILY    polyethylene glycol (MIRALAX) packet 17 g  17 g Oral DAILY    albuterol-ipratropium (DUO-NEB) 2.5 MG-0.5 MG/3 ML  3 mL Nebulization Q6H PRN    therapeutic multivitamin (THERAGRAN) tablet 1 Tab  1 Tab Oral DAILY    potassium chloride SR (KLOR-CON 10) tablet 40 mEq  40 mEq Oral DAILY    alteplase (CATHFLO) 1 mg in sterile water (preservative free) 1 mL injection  1 mg InterCATHeter PRN    finasteride (PROSCAR) tablet 5 mg  5 mg Oral DAILY    spironolactone (ALDACTONE) tablet 25 mg  25 mg Oral DAILY    clonazePAM (KlonoPIN) tablet 0.5 mg  0.5 mg Oral TID PRN    diphenhydrAMINE (BENADRYL) capsule 25 mg  25 mg Oral QHS PRN    octreotide (SANDOSTATIN) injection 25 mcg  25 mcg SubCUTAneous TID    benzocaine-menthol (CEPACOL) lozenge 1 Lozenge  1 Lozenge Mucous Membrane PRN    digoxin (LANOXIN) tablet 0.0625 mg  62.5 mcg Oral Q MON, WED & FRI    pantoprazole (PROTONIX) tablet 40 mg  40 mg Oral ACB    allopurinol (ZYLOPRIM) tablet 100 mg  100 mg Oral DAILY    arformoterol (BROVANA) neb solution 15 mcg  15 mcg Nebulization BID RT    And    budesonide (PULMICORT) 500 mcg/2 ml nebulizer suspension  500 mcg Nebulization BID RT    artificial saliva (MOUTH KOTE) 1 Spray  1 Spray Oral PRN    lactobac ac& pc-s.therm-b.anim (TIAN Q/RISAQUAD)  1 Cap Oral DAILY    oxyCODONE IR (ROXICODONE) tablet 5 mg  5 mg Oral Q6H PRN    tamsulosin (FLOMAX) capsule 0.4 mg  0.4 mg Oral DAILY    insulin lispro (HUMALOG) injection   SubCUTAneous AC&HS    Warfarin MD/NP dosing   Other PRN    sodium chloride (NS) flush 5-40 mL  5-40 mL IntraVENous Q8H    sodium chloride (NS) flush 5-40 mL  5-40 mL IntraVENous PRN    acetaminophen (TYLENOL) tablet 650 mg  650 mg Oral Q6H PRN    naloxone (NARCAN) injection 0.4 mg  0.4 mg IntraVENous PRN    ondansetron (ZOFRAN) injection 4 mg  4 mg IntraVENous Q4H PRN    bisacodyl (DULCOLAX) tablet 5 mg  5 mg Oral DAILY PRN    sucralfate (CARAFATE) tablet 1 g  1 g Oral AC&HS    influenza vaccine 2019-20 (6 mos+)(PF) (FLUARIX/FLULAVAL/FLUZONE QUAD) injection 0.5 mL  0.5 mL IntraMUSCular PRIOR TO DISCHARGE    hydrALAZINE (APRESOLINE) 20 mg/mL injection 20 mg  20 mg IntraVENous Q6H PRN    dextrose 10 % infusion 125-250 mL  125-250 mL IntraVENous PRN      No Known Allergies    Objective:  Vitals:    Vitals:    01/02/20 0420 01/02/20 0425 01/02/20 0741 01/02/20 1058   BP:       Pulse: 86  83 87   Resp: 18  16 18   Temp: 98.3 °F (36.8 °C)  98.1 °F (36.7 °C) 98.6 °F (37 °C)   SpO2: 94%  99% 99%   Weight:  78.7 kg (173 lb 8 oz)     Height:         Intake and Output:  01/02 0701 - 01/02 1900  In: 135 [P.O.:100; I.V.:35]  Out: 200 [Urine:200]  12/31 1901 - 01/02 0700  In: 1178.7 [P.O.:1000; I.V.:178.7]  Out: 3250 [Urine:3250]    Physical Examination:    General: Elderly man in no acute distress  Neck:  No lines   Resp:  TA  CV:  VAD sounds;   No LE edema  GI:  Soft and non-tender; no distension  Neurologic:  Alert and appropriate; normal speech  :  No Lizama    [x]    High complexity decision making was performed  [x]    Patient is at high-risk of decompensation with multiple organ involvement    Lab Data Personally Reviewed: I have reviewed all the pertinent labs, microbiology data and radiology studies during assessment. Recent Labs     01/02/20  0436 01/01/20  0414 12/31/19  0323   * 132* 130*   K 4.3 4.6 4.4   CL 96* 98 95*   CO2 27 28 28   * 96 141*   BUN 21* 21* 20   CREA 1.15 1.35* 1.59*   CA 8.9 8.7 8.8   MG 1.9 2.0 1.7   ALB 2.6* 2.8* 2.8*   SGOT 20 21 21   ALT 13 12 12   INR 1.4* 2.1* 2.1*     Recent Labs     01/02/20  0436 01/01/20  1747 01/01/20  0414 12/31/19  1618 12/31/19  1525 12/31/19  0323   WBC 7.5  --  7.6  --   --  8.4   HGB 8.2* 8.4* 8.2* 8.0* 6.3* 8.4*   HCT 26.0* 26.9* 26.8* 25.8* 20.5* 27.2*   *  --  133*  --   --  140*     No results found for: SDES  Lab Results   Component Value Date/Time    Culture result: LIGHT NORMAL RESPIRATORY TIAN 12/31/2019 04:18 PM    Culture result: (A) 12/31/2019 03:24 PM     PROBABLE PSEUDOMONAS SPECIES GROWING IN 2 OF 4 BOTTLES DRAWN SITE = RFA AND L WRIST    Culture result: (A) 12/31/2019 03:24 PM     PRELIMINARY REPORT OF GRAM NEGATIVE RODS GROWING IN 1 OF 4 BOTTLES DRAWN CALLED TO AND READ BACK BY Justice Jain RN AT 8000 ON 1.1.20 RB    Culture result: (A) 12/31/2019 03:24 PM     PRELIMINARY REPORT OF GRAM NEGATIVE RODS GROWING IN 2ND OF 4 BOTTLES DRAWN (DIFFERENT SITE=L WRIST) CALLED TO AND READ BACK BY Justice Jain RN AT 15:51 ON 1/1/20 BY [facility].     Culture result: REMAINING BOTTLE(S) HAS/HAVE NO GROWTH SO FAR 12/31/2019 03:24 PM     Recent Results (from the past 24 hour(s))   GLUCOSE, POC    Collection Time: 01/01/20  4:44 PM   Result Value Ref Range    Glucose (POC) 129 (H) 65 - 100 mg/dL    Performed by Leia PAULINO (CON)    HGB & HCT    Collection Time: 01/01/20  5:47 PM   Result Value Ref Range    HGB 8.4 (L) 12.1 - 17.0 g/dL    HCT 26.9 (L) 36.6 - 50.3 %   GLUCOSE, POC    Collection Time: 01/01/20 10:11 PM   Result Value Ref Range    Glucose (POC) 117 (H) 65 - 100 mg/dL    Performed by [address] [place] [address], [facility]    Collection Time: 01/02/20  4:36 AM   Result Value Ref Range    Sodium 129 (L) 136 - 145 mmol/L    Potassium 4.3 3.5 - 5.1 mmol/L    Chloride 96 (L) 97 - 108 mmol/L    CO2 27 21 - 32 mmol/L    Anion gap 6 5 - 15 mmol/L    Glucose 104 (H) 65 - 100 mg/dL    BUN 21 (H) 6 - 20 MG/DL    Creatinine 1.15 0.70 - 1.30 MG/DL    BUN/Creatinine ratio 18 12 - 20      GFR est AA >60 >60 ml/min/1.73m2    GFR est non-AA >60 >60 ml/min/1.73m2    Calcium 8.9 8.5 - 10.1 MG/DL    Bilirubin, total 0.8 0.2 - 1.0 MG/DL    ALT (SGPT) 13 12 - 78 U/L    AST (SGOT) 20 15 - 37 U/L    Alk.  phosphatase 98 45 - 117 U/L    Protein, total 6.5 6.4 - 8.2 g/dL    Albumin 2.6 (L) 3.5 - 5.0 g/dL    Globulin 3.9 2.0 - 4.0 g/dL    A-G Ratio 0.7 (L) 1.1 - 2.2     MAGNESIUM    Collection Time: 01/02/20  4:36 AM   Result Value Ref Range    Magnesium 1.9 1.6 - 2.4 mg/dL   CBC W/O DIFF    Collection Time: 01/02/20  4:36 AM   Result Value Ref Range    WBC 7.5 4.1 - 11.1 K/uL    RBC 2.77 (L) 4.10 - 5.70 M/uL    HGB 8.2 (L) 12.1 - 17.0 g/dL    HCT 26.0 (L) 36.6 - 50.3 %    MCV 93.9 80.0 - 99.0 FL    MCH 29.6 26.0 - 34.0 PG    MCHC 31.5 30.0 - 36.5 g/dL    RDW 17.8 (H) 11.5 - 14.5 %    PLATELET 120 (L) 836 - 400 K/uL    MPV 9.8 8.9 - 12.9 FL    NRBC 0.0 0  WBC    ABSOLUTE NRBC 0.00 0.00 - 0.01 K/uL   PROTHROMBIN TIME + INR    Collection Time: 01/02/20  4:36 AM   Result Value Ref Range    INR 1.4 (H) 0.9 - 1.1      Prothrombin time 14.0 (H) 9.0 - 11.1 sec   PTT    Collection Time: 01/02/20  4:36 AM   Result Value Ref Range    aPTT 38.2 (H) 22.1 - 32.0 sec    aPTT, therapeutic range     58.0 - 77.0 SECS   DIGOXIN    Collection Time: 01/02/20  4:36 AM   Result Value Ref Range    Digoxin level 0.8 (L) 0.90 - 2.00 NG/ML   LACTIC ACID    Collection Time: 01/02/20  4:36 AM   Result Value Ref Range    Lactic acid 0.8 0.4 - 2.0 MMOL/L   PROCALCITONIN    Collection Time: 01/02/20  4:36 AM   Result Value Ref Range    Procalcitonin 3.97 ng/mL   LD    Collection Time: 01/02/20  4:36 AM   Result Value Ref Range     85 - 241 U/L   NT-PRO BNP Collection Time: 01/02/20  4:36 AM   Result Value Ref Range    NT pro-BNP 6,116 (H) <125 PG/ML   GLUCOSE, POC    Collection Time: 01/02/20  6:49 AM   Result Value Ref Range    Glucose (POC) 106 (H) 65 - 100 mg/dL    Performed by KIM Allison    Collection Time: 01/02/20 10:58 AM   Result Value Ref Range    Glucose (POC) 128 (H) 65 - 100 mg/dL    Performed by Vicente Matos MD

## 2020-01-02 NOTE — PROGRESS NOTES
[facility] in [place], [place]  Inpatient Progress Note      Patient name: Nickie Reddy  Patient : 1950  Patient MRN: [id number]  Attending MD: Vika Ibarra MD  Date of service: 20    Chief Complaint:   Rolando Hodan azucena LVAD implant     HPI: Sona Kiser is a 71y.o. year old pleasant white male with a history of HTN, HLD, JOSE, CAD s/p cardiac arrest VFib s/p CABG () c/b sternal would infection and sternectomy, ischemic cardiomyopathy LVEF 15-20%, s/p ICD and with LBBB. He was admitted with acute on chronic systolic heart failure with massive volume overload > 20 lbs, in the setting of atrial fibrillation s/p failed DCCV and underwent DCCV and RHC on .  S/p BiVICD on 19 with Dr. Rosemarie Rees. He was discharged home home on IV milrinone on 19. Mortimer Fabry has been followed closely by Dr. Sandra Ring and the [facility].     Mr. Kiser was admitted for acute on chronic systolic heart failure. He underwent implantation of Impella 5.0 due to CS and has completed his LVAD evaluation. Mortimer Fabry meets criteria for implant of HM3 as DT. He was NYHA Class IV prior to implant of Impella 5.0, has LVEF < 15%, was intolerant of GDMT due to symptomatic hypotension and renal dysfunction. He remains dependent on temporary MCS support. RHC  revealed compensated hemodynamics on Impella. His renal function has improved dramatically with improvement in his hemodynamics. He is not a suitable transplant candidate due to single kidney, sternectomy, debility, and frailty. He was reviewed by Kole Bailey and felt to be a high risk heart transplant candidate due to multiple co-morbidities as well as the afore mentioned conditions.  He remained in the CCU and underwent a HM 3 implant as DT on .   He was weaned off of pressors and transferred to [place] [address] where he continues to undergo PT/OT and hemodynamic optimization.       24Hr Events  2/4 Blood cultures positive for gram negative rods    Procedure:  Procedure(s):  REMOVAL TEMPORARY LEFT VENTRICULAR ASSIST DEVICE, IMPLANTATION OF LEFT VENTRICULAR ASSIST DEVICE PERMANENT (VAD), ECC, JACQUE, EPI AORTIC US BY DR Yasmeen Hansen.     POD:  44     Impression / Plan:   Heart Failure Status: NYHA Class IV    Chronic systolic heart failure due to ICM, NYHA Class IV, EF < 15%, cardiogenic shock bridged with Impella 5.0 support to HM 3 implant   S/p Impella removal and LVAD implant 11/18/19  RPMs 6400 rpm - frequent PI events  TTE with bubble study negative for PFO  Stop milrinone gtt  Obtain CardioMEMS readings daily- PAD 11 on 12/30  Holding diuretics  Encourage PO fluids  Decrease Sildenafil to 10 mg PO TID due to syncope - rx sent to local pharmacy to initiate PA   Intolerant of BB due to RV failure  Intolerant of ACEi/ARB/ARNI/AA due to JUAN J on CKD 3  Start midodrine 2.5mg po BID  Strict I/O, daily weights, Na+ restricted diet   Continue LVAD education   Dressing change frequency three times weekly, Sutures removed 12/26/19  Delayed skin healing on bottom portion of sternotomy- evaluated by CTS, no intervention   TTE 12/16- EF 15-20%    Bacteremia - Pseudomonas  Blood cultures (12/31/19) 2/4 bottles +Pseudomonas & 1/4 bottles GNRs    Likely d/t UTI (Pseudomonas)  On IV cefepime   Follow procalcitonin and lactic acid levels    Generalized myoclonus   Improved   Appreciate Neurology input  Decreased Keppra due to somnolence - 125 mg po BID   Head CT negative for acute process  EEG suggestive of mild generalized encephalopathic process, possibly related to underlying structural brain injury vs metabolic abnormality  Monitor closely      Anticoagulation for LVAD, INR goal 1.5-2  Goal decreased d/t persistent hematuria   No ASA d/t hematuria  INR 1.4  Coumadin - 2mg tonight    Epistaxis  Resolved      Acute Respiratory Failure post op  Improved with aggressive diuresis  Off supplemental O2  Pulmonary hygiene   Cont home CPAP- must use while sleeping   Schedule PET CT prior to discharge    Acute on CKD 3, atrophic left kidney  Appreciate Nephrology assistance  Watch Cr - improving   Hold diuretics for positive orthostatics   Albumin today    Avoid nephrotoxins, trend labs   Renally dose meds      CAD s/p CABG   Off ASA d/t hematuria  No BB d/t RV failure  Hold statin due to recent hepatic failure   LHC performed 11/13 - low likelihood of benefit from revascularization      Hx of VF arrest s/p BiV-ICD  No recent shocks  Keep K > 4 and Mg > 2   Mag ox to 800mg po BID    PAF   Dig level 0.8-cont dig (62.5 mcg qMWF)  No BB due to RV failure   Repeat TFTs WNL     Hx of gout  Uric acid WNL    Acute blood loss anemia  Likely due to hematuria  Cont octreotide 25 mcg SQ TID   Fecal occult 12/14 negative, positive on 12/17  Appreciate urology recommendations  Hematuria improved  Monitor for now  Will d/w Urology about bladder spasms      Suspected aspiration pneumonia  Sputum culture showing scant growth of yeast  Repeat Sputum culture (12/31/19) light normal resp soren    Urinary retention, c/b serratia UTI and hematuria  Appreciate Urology consult   Repeat UA with cx negative 12/15   Cystoscopy performed 11/13 shows catheter induced cystitis   Pseudomonas aeruginosa positive UA culture (12/31/19) - on Cefepmine    Malnutrition   Appreciate Nutritionist consult  Prealbumin weekly - 12.6 on 12/30   Diet as tolerated  Intolerant of mirtazapine d/t tremors, confusion   Decreased Marinol to 2.5 mg PO qPM d/t lethargy  Cont MVI     COPD   Appreciate Pulmonology assistance   Continue nebs PRN       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  No additional treatment at this time     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  Etiology unclear  Continue [facility]  Appreciate neurology assistance      Hx of sternal wound infection, sternectomy  Sternum closed post op- monitor     Vocal cord paralysis  Improved  ENT recs appreciated  Neck CT- R neck edema, no airway compromise   Speech therapy following - appreciate input    Anxiety  Jonny 0.5 mg TID prn anxiety    Depression  Stop lexapro d/t hyponatremia   Start Effexor XR 75mg daily  Monitor QTc - 478 ms on 12/22   Monitor rhythm strips     Debility  Continue PT/OT     Bladder spasms  Received pyridium 100mg x4 doses  Oxybutynin 5mg Q6hr prn      Ppx  Protonix   PT/OT   Bowel regimen   PICC placed 11/22/19      Dispo: Will need IP rehab. Not ready for discharge at this time          Patient seen and examined. Data and note reviewed. I have discussed and agree with the plans as noted. 71year old male with a history of ICM s/p LVAD, urinary retention and frequent UTIs whose post op course has been complicated by RV failure, bacteremia, malnutrition, and debility. Will start low dose midodrine d/t disabling orthostatic hypotension. Continue IV cefepime. Will require inpatient rehab. Thank you for allowing us to participate in your patient's care. Muonika Singh MD, [facility] - [address] of Cardiology, [address] [place] [address]  Office [phone]  Fax [phone]          LVAD INTERROGATION:  Device interrogated in person  Device function normal, normal flow, multiple PI events    LVAD   Pump Speed (RPM): 6500  Pump Flow (LPM): 6.3  MAP: 78  PI (Pulsitility Index): 1.8  Power: 56   Test: Yes  Back Up  at Bedside & Labeled: Yes  Power Module Test: Yes  Driveline Site Care: No  Driveline Dressing: Clean, Dry, and Intact  Outpatient: No  MAP in Therapeutic Range (Outpatient): Yes  Testing  Alarms Reviewed: Yes  Back up SC speed: 6400  Back up Low Speed Limit: 6000  Emergency Equipment with Patient?: Yes  Emergency procedures reviewed?: Yes  Drive line site inspected?: Yes  Drive line intergrity inspected?: Yes  Drive line dressing changed?: Yes    PHYSICAL EXAM:  Visit Vitals  BP 91/72 (BP 1 Location: Left arm, BP Patient Position: At rest) Pulse 87   Temp 98.6 °F (37 °C)   Resp 18   Ht 6' 2\" (1.88 m)   Wt 173 lb 8 oz (78.7 kg)   SpO2 99%   BMI 22.28 kg/m²       Physical Exam   Constitutional: He appears well-developed. He appears lethargic. He appears cachectic. He is sleeping. He appears ill. No distress. HENT:   Head: Normocephalic. Neck: Normal range of motion. Neck supple. No JVD present. Cardiovascular: Normal rate, regular rhythm and normal heart sounds. + VAD hum    Pulmonary/Chest: Effort normal and breath sounds normal. No respiratory distress. Abdominal: Soft. Bowel sounds are normal. He exhibits no distension. Musculoskeletal: Normal range of motion. General: No edema. Neurological: He appears lethargic. Skin: Skin is warm and dry. Psychiatric: He has a normal mood and affect. His behavior is normal.   Nursing note and vitals reviewed. REVIEW OF SYSTEMS:  Review of Systems   Constitutional: Positive for malaise/fatigue. Negative for chills and fever. HENT: Positive for hearing loss. Negative for nosebleeds. Respiratory: Negative for cough and shortness of breath. Mild dyspnea   Cardiovascular: Negative for chest pain, palpitations, orthopnea and leg swelling. Gastrointestinal: Negative for heartburn, nausea and vomiting. Genitourinary: Positive for dysuria. Negative for hematuria. Bladder spasms    Musculoskeletal: Negative. Neurological: Positive for dizziness and weakness. Negative for headaches. Endo/Heme/Allergies: Does not bruise/bleed easily.        PAST MEDICAL HISTORY:  Past Medical History:   Diagnosis Date    Degenerative disc disease, lumbar     Heart failure ([address]     High cholesterol     Hypertension     Paroxysmal atrial fibrillation ([facility] [address] 4/2/2019    Spinal stenosis        PAST SURGICAL HISTORY:  Past Surgical History:   Procedure Laterality Date    COLONOSCOPY Left 11/11/2019    COLONOSCOPY at bedside performed by Nita Macdonald MD at [address] HX APPENDECTOMY      HX CORONARY ARTERY BYPASS GRAFT      triple    HX HERNIA REPAIR      HX IMPLANTABLE CARDIOVERTER DEFIBRILLATOR      [place] CARDIOVERSION ELECTIVE ARRHYTHMIA EXTERNAL N/A 6/10/2019    EP CARDIOVERSION performed by Kendall Stallworth MD at [address], [facility] CATH LAB    [place] CARDIOVERSION ELECTIVE ARRHYTHMIA EXTERNAL N/A 2019    EP CARDIOVERSION performed by Bradly Slade MD at [address], [facility] CATH LAB    [place] INSJ/RPLCMT PERM DFB W/TRNSVNS LDS 1/DUAL CHMBR N/A 2019    INSERT ICD BIV MULTI performed by Manuela Soriano MD at [address], [facility] CATH LAB    [place] TCAT IMPL WRLS P-ART PRS SNR L-T HEMODYN MNTR N/A 2019    IMPLANT HEART FAILURE MONITORING DEVICE performed by Jannie Melton MD at [address], [facility] CATH LAB       FAMILY HISTORY:  Family History   Problem Relation Age of Onset    Lung Disease Mother     Hypertension Mother     Arthritis-osteo Mother     Heart Disease Mother     Heart Disease Father     Diabetes Father        SOCIAL HISTORY:  Social History     Socioeconomic History    Marital status:      Spouse name: Not on file    Number of children: Not on file    Years of education: Not on file    Highest education level: Not on file   Tobacco Use    Smoking status: Former Smoker     Last attempt to quit: 2010     Years since quittin.0    Smokeless tobacco: Never Used   Substance and Sexual Activity    Alcohol use: Not Currently     Comment: rarely    Drug use: Never   Other Topics Concern       LABORATORY RESULTS:     Labs Latest Ref Rng & Units 2019   WBC 4.1 - 11.1 K/uL 7.5 - 7.6 - - - 8.4   RBC 4.10 - 5.70 M/uL 2.77(L) - 2.79(L) - - - 2.86(L)   Hemoglobin 12.1 - 17.0 g/dL 8. 2(L) 8.4(L) 8.2(L) 8.0(L) 6. 3(L) - 8. 4(L)   Hematocrit 36.6 - 50.3 % 26. 0(L) 26. 9(L) 26. 8(L) 25. 8(L) 20. 5(L) - 27. 2(L)   MCV 80.0 - 99.0 FL 93.9 - 96.1 - - - 95.1   Platelets 569 - 976 K/uL 123(L) - 133(L) - - - 140(L) Lymphocytes 12 - 49 % - - - - - - -   Monocytes 5 - 13 % - - - - - - -   Eosinophils 0 - 7 % - - - - - - -   Basophils 0 - 1 % - - - - - - -   Albumin 3.5 - 5.0 g/dL 2. 6(L) - 2. 8(L) - - - 2. 8(L)   Calcium 8.5 - 10.1 MG/DL 8.9 - 8.7 - - - 8.8   SGOT 15 - 37 U/L 20 - 21 - - - 21   Glucose 65 - 100 mg/dL 104(H) - 96 - - - 141(H)   BUN 6 - 20 MG/DL 21(H) - 21(H) - - - 20   Creatinine 0.70 - 1.30 MG/DL 1.15 - 1.35(H) - - - 1.59(H)   Sodium 136 - 145 mmol/L 129(L) - 132(L) - - - 130(L)   Potassium 3.5 - 5.1 mmol/L 4.3 - 4.6 - - - 4.4   TSH 0.36 - 3.74 uIU/mL - - - - - - -   LDH 85 - 241 U/L 162 - 167 - - 189 -   CEA ng/mL - - - - - - -   Some recent data might be hidden     Lab Results   Component Value Date/Time    TSH 2.30 12/03/2019 03:29 AM    TSH 2.40 10/25/2019 07:39 PM    TSH 2.45 06/01/2019 04:16 AM       ALLERGY:  No Known Allergies     CURRENT MEDICATIONS:  Current Facility-Administered Medications   Medication Dose Route Frequency    warfarin (COUMADIN) tablet 2 mg  2 mg Oral ONCE    venlafaxine-SR (EFFEXOR-XR) capsule 75 mg  75 mg Oral DAILY WITH BREAKFAST    midodrine (PROAMITINE) tablet 2.5 mg  2.5 mg Oral BID WITH MEALS    sildenafil (pulm.hypertension) (REVATIO) tablet 10 mg  10 mg Oral TID    oxybutynin (DITROPAN) tablet 5 mg  5 mg Oral Q6H PRN    cefepime (MAXIPIME) 2 g in 0.9% sodium chloride (MBP/ADV) 100 mL  2 g IntraVENous Q24H    [Held by provider] bumetanide (BUMEX) injection 1 mg  1 mg IntraVENous BID    magnesium oxide (MAG-OX) tablet 800 mg  800 mg Oral BID    milrinone (PRIMACOR) 20 MG/100 ML D5W infusion  0.125 mcg/kg/min IntraVENous CONTINUOUS    albumin human 5% (BUMINATE) solution 12.5 g  12.5 g IntraVENous DIALYSIS PRN    lidocaine (URO-JET/ GLYDO) 2 % jelly   Urethral PRN    senna-docusate (PERICOLACE) 8.6-50 mg per tablet 1 Tab  1 Tab Oral DAILY    epoetin yvonne-epbx (RETACRIT) injection 20,000 Units  20,000 Units SubCUTAneous Q MON, WED & FRI    levETIRAcetam (KEPPRA) tablet 125 mg  125 mg Oral BID    dronabinol (MARINOL) capsule 2.5 mg  2.5 mg Oral DAILY    polyethylene glycol (MIRALAX) packet 17 g  17 g Oral DAILY    albuterol-ipratropium (DUO-NEB) 2.5 MG-0.5 MG/3 ML  3 mL Nebulization Q6H PRN    therapeutic multivitamin (THERAGRAN) tablet 1 Tab  1 Tab Oral DAILY    potassium chloride SR (KLOR-CON 10) tablet 40 mEq  40 mEq Oral DAILY    alteplase (CATHFLO) 1 mg in sterile water (preservative free) 1 mL injection  1 mg InterCATHeter PRN    finasteride (PROSCAR) tablet 5 mg  5 mg Oral DAILY    spironolactone (ALDACTONE) tablet 25 mg  25 mg Oral DAILY    clonazePAM (KlonoPIN) tablet 0.5 mg  0.5 mg Oral TID PRN    diphenhydrAMINE (BENADRYL) capsule 25 mg  25 mg Oral QHS PRN    octreotide (SANDOSTATIN) injection 25 mcg  25 mcg SubCUTAneous TID    benzocaine-menthol (CEPACOL) lozenge 1 Lozenge  1 Lozenge Mucous Membrane PRN    digoxin (LANOXIN) tablet 0.0625 mg  62.5 mcg Oral Q MON, WED & FRI    pantoprazole (PROTONIX) tablet 40 mg  40 mg Oral ACB    allopurinol (ZYLOPRIM) tablet 100 mg  100 mg Oral DAILY    arformoterol (BROVANA) neb solution 15 mcg  15 mcg Nebulization BID RT    And    budesonide (PULMICORT) 500 mcg/2 ml nebulizer suspension  500 mcg Nebulization BID RT    artificial saliva (MOUTH KOTE) 1 Spray  1 Spray Oral PRN    lactobac ac& pc-s.therm-b.anim (TIAN Q/RISAQUAD)  1 Cap Oral DAILY    oxyCODONE IR (ROXICODONE) tablet 5 mg  5 mg Oral Q6H PRN    tamsulosin (FLOMAX) capsule 0.4 mg  0.4 mg Oral DAILY    insulin lispro (HUMALOG) injection   SubCUTAneous AC&HS    Warfarin MD/NP dosing   Other PRN    sodium chloride (NS) flush 5-40 mL  5-40 mL IntraVENous Q8H    sodium chloride (NS) flush 5-40 mL  5-40 mL IntraVENous PRN    acetaminophen (TYLENOL) tablet 650 mg  650 mg Oral Q6H PRN    naloxone (NARCAN) injection 0.4 mg  0.4 mg IntraVENous PRN    ondansetron (ZOFRAN) injection 4 mg  4 mg IntraVENous Q4H PRN    bisacodyl (DULCOLAX) tablet 5 mg 5 mg Oral DAILY PRN    sucralfate (CARAFATE) tablet 1 g  1 g Oral AC&HS    influenza vaccine 2019-20 (6 mos+)(PF) (FLUARIX/FLULAVAL/FLUZONE QUAD) injection 0.5 mL  0.5 mL IntraMUSCular PRIOR TO DISCHARGE    hydrALAZINE (APRESOLINE) 20 mg/mL injection 20 mg  20 mg IntraVENous Q6H PRN    dextrose 10 % infusion 125-250 mL  125-250 mL IntraVENous PRN         Thank you for allowing me to participate in this patient's care.     Hair Pulliam NP  [address] [place] [address], Suite [facility]  Phone: [phone]  Fax: [phone]

## 2020-01-02 NOTE — PROGRESS NOTES
Occupational Therapy  Chart reviewed, attempted to see patient for OT, patient in bed asleep, attempted to arouse with various verbal and tactile cues, patient moaning and going back to sleep, will attempt OT again as able and as appropriate. Raúl Ortiz.  MS Sebastian OTR/L

## 2020-01-02 NOTE — PROGRESS NOTES
Problem: Mobility Impaired (Adult and Pediatric)  Goal: *Acute Goals and Plan of Care (Insert Text)  Description  FUNCTIONAL STATUS PRIOR TO ADMISSION: Patient was modified independent using a single point cane for functional mobility. Patient reports an increasingly sedentary lifestyle 2* fatigue and SOB/dyspnea. Retired (so is his wife). Patient reports x 3 falls within the last couple of weeks. Patient is wearing either nasal cannula or CPAP at night. LVAD work-up has been initiated. HOME SUPPORT PRIOR TO ADMISSION: The patient lived with his wife, but did not require physical assistance. Physical Therapy Goals:    Reassessed on 1/2/2020 and goals not met, carry-over. Reassessed on 12/26/2019, goals not met but remain appropriate, so carry over  Weekly reassessment completed 12/19/2019 and goals downgraded as appropriate. 1.  Patient will move from supine to sit and sit to supine, scoot up and down and roll side to side in bed with contact guard assistance within 7 days. 2.  Patient will perform sit to/from stand with minimal assistance x 1 within 7 days. 3.  Patient will ambulate 25 feet with least restrictive assistive device and moderate assistance within 7 days. 4.  Stair goal to be formulated when appropriate. 5.  Patient will perform cardiac exercises per protocol with supervision within 7 days. 6.  Patient will verbally and functionally recall mindful movement principles (Move in the Tube) with minimal verbal cuing/reminding within 7 days. 7.  Patient will perform power exchange for power module to/from battery with moderate assistance within 7 days. Re-evaluation completed 11/20/2019 and new goals formulated following LVAD implantation. Reviewed and cont 12/3/2019. Reviewed and continued 12/12/2019  1. Patient will move from supine to sit and sit to supine, scoot up and down and roll side to side in bed with minimal assistance/contact guard assist within 7 days.     2.  Patient will perform sit to/from stand with minimal assistance/contact guard assist within 7 days. 3.  Patient will ambulate 150 feet with least restrictive assistive device and minimal assistance/contact guard assist within 7 days. 4.  Patient will ascend/descend 4 stairs with handrail(s) with minimal assistance/contact guard assist within 7 days. 5.  Patient will perform cardiac exercises per protocol with supervision within 7 days. 6.  Patient will verbally and functionally recall 3/3 sternal precautions within 7 days. 7.  Patient will perform power exchange for power module to/from battery with moderate assistance  within 7 days. Initiated 10/27/2019; updated 11/15/2019   1. Patient will move from supine to sit and sit to supine, scoot up and down and roll side to side in bed with independence within 7 days. 2.  Patient will perform sit to/from stand with supervision/set-up within 7 days. 3.  Patient will ambulate 200 feet with least restrictive assistive device and supervision/set-up within 7 days. 4.  Patient will ascend/descend 4 stairs with  handrail(s) with supervision/set-up within 7 days for functional strengthening and community reintegration. .  5.  Patient will verbally and functionally recall 3/3 sternal precautions within 7 days in preparation for LVAD implantation. 6.  Patient will perform a mock power exchange for power module to/from battery with supervision/set-up within 7 days in preparation for LVAD implantation. 1.  Patient will move from supine to sit and sit to supine, scoot up and down and roll side to side in bed with independence within 7 days. 2.  Patient will perform sit to/from stand with supervision/set-up within 7 days. 3.  Patient will ambulate 150 feet with least restrictive assistive device and supervision/set-up within 7 days.    4.  Patient will ascend/descend 4 stairs with  handrail(s) with supervision/set-up within 7 days for functional strengthening and community reintegration. .  5.  Patient will verbally and functionally recall 3/3 sternal precautions within 7 days in preparation for LVAD implantation. 6.  Patient will perform a mock power exchange for power module to/from battery with supervision/set-up within 7 days in preparation for LVAD implantation. Outcome: Progressing Towards Goal  PHYSICAL THERAPY TREATMENT  Patient: Verona Cyr (81 y.o. male)  Date: 1/2/2020  Diagnosis: Acute decompensated heart failure ([facility] [address] [I50.9]   <principal problem not specified>  Procedure(s) (LRB):  LEFT BRACHIAL THROMBECTOMY (Left) 38 Days Post-Op  Precautions: Fall, Sternal(LVAD )  Chart, physical therapy assessment, plan of care and goals were reviewed. ASSESSMENT  Patient continues with skilled PT services and is very slowly progressing towards goals. Patient participated in wall-power <> batteries LVAD switch-overs (required maximal assistance + verbal cuing for unlocking/locking power lead - unable to visually see), edge of bed sitting x 1.5 minutes prior to observable facial pallor, c/o dizziness, and garbled speech (unable to attain MAP quick enough). Once returned to supine, all symptoms dissipated. Patient participate in supine gluteal bridges and resisted LE exercises (hip flexion/extension and knee flexion/extension). Patient remains motivated in therapy sessions despite set-backs and medical complications. Current Level of Function Impacting Discharge (mobility/balance): Unable to tolerate sitting up at EOB > 1.5 minutes 2* dizziness    Other factors to consider for discharge: Extensive hospital stay (pre-/post- LVAD implantation)         PLAN :  Patient continues to benefit from skilled intervention to address the above impairments. Continue treatment per established plan of care. to address goals.     Recommendation for discharge: (in order for the patient to meet his/her long term goals)  Therapy 3 hours per day 5-7 days per week    This discharge recommendation:  Has been made in collaboration with the attending provider and/or case management    IF patient discharges home will need the following DME: to be determined (TBD)       SUBJECTIVE:   Patient stated Tony High, I can't handle this anymore [sitting up].     OBJECTIVE DATA SUMMARY:   Critical Behavior:  Neurologic State: Alert  Orientation Level: Oriented X4  Cognition: Appropriate for age attention/concentration  Safety/Judgement: Decreased insight into deficits, Decreased awareness of need for safety, Decreased awareness of need for assistance  Functional Mobility Training:  Bed Mobility:  Supine <> Sit: Supervision    Balance:  Sitting: Impaired; Without support  Sitting - Static: Fair (occasional)  Sitting - Dynamic: Poor (constant support)(Facial pallor; Dizziness; Garbled speech)      Activity Tolerance:   Fair, observed SOB with activity, and signs and symptoms of orthostatic hypotension  Please refer to the flowsheet for vital signs taken during this treatment.     After treatment patient left in no apparent distress:   Supine in bed, Call bell within reach, Caregiver / family present, and Side rails x 3    COMMUNICATION/COLLABORATION:   The patients plan of care was discussed with: Occupational Therapist, Registered Nurse, and     Jen Lockhart, PT, DPT   Time Calculation: 33 mins

## 2020-01-02 NOTE — PROGRESS NOTES
1930; Bedside shift change report given to Magaly Doe (oncoming nurse) by Rhina Vega (offgoing nurse). Report included the following information SBAR, Kardex, Intake/Output, MAR, Recent Results, and Cardiac Rhythm paced/afib . Problem: Falls - Risk of  Goal: *Absence of Falls  Description  Document Ross Garcia Fall Risk and appropriate interventions in the flowsheet. Outcome: Progressing Towards Goal  Note: Fall Risk Interventions:  Mobility Interventions: Communicate number of staff needed for ambulation/transfer, OT consult for ADLs, Patient to call before getting OOB, PT Consult for mobility concerns    Mentation Interventions: Adequate sleep, hydration, pain control, Door open when patient unattended, Increase mobility, More frequent rounding, Reorient patient, Room close to nurse's station, Toileting rounds, Update white board    Medication Interventions: Patient to call before getting OOB, Teach patient to arise slowly    Elimination Interventions: Call light in reach, Patient to call for help with toileting needs    History of Falls Interventions: Consult care management for discharge planning, Door open when patient unattended, Evaluate medications/consider consulting pharmacy, Investigate reason for fall, Room close to nurse's station, Utilize gait belt for transfer/ambulation, Assess for delayed presentation/identification of injury for 48 hrs (comment for end date)         Problem: Pressure Injury - Risk of  Goal: *Prevention of pressure injury  Description  Document Mich Scale and appropriate interventions in the flowsheet. Outcome: Progressing Towards Goal  Note: Pressure Injury Interventions:  Sensory Interventions: Assess changes in LOC, Assess need for specialty bed, Float heels, Keep linens dry and wrinkle-free, Pressure redistribution bed/mattress (bed type), Turn and reposition approx.  every two hours (pillows and wedges if needed)    Moisture Interventions: Apply protective barrier, creams and emollients, Absorbent underpads, Internal/External urinary devices    Activity Interventions: Increase time out of bed, Pressure redistribution bed/mattress(bed type), PT/OT evaluation    Mobility Interventions: Pressure redistribution bed/mattress (bed type), PT/OT evaluation    Nutrition Interventions: Document food/fluid/supplement intake, Offer support with meals,snacks and hydration    Friction and Shear Interventions: Apply protective barrier, creams and emollients, Feet elevated on foot rest, Foam dressings/transparent film/skin sealants, HOB 30 degrees or less, Lift sheet, Lift team/patient mobility team, Minimize layers, Sit at 90-degree angle

## 2020-01-02 NOTE — PROGRESS NOTES
0730: Bedside shift change report given to Lodge Polelowell Dominguez (oncoming nurse) by Sushil Story RN (offgoing nurse). Report included the following information SBAR and Kardex. -NG tube and feedings recommended for patient. Patient and family not wanting NG tube at this time. Nutrition in room to explain the benefit of tube feedings. Patient and family still dont want NG tube. In agreement with nutrition to eat more, drink ensure and family will bring in outside food that patient likes. Will continue with calorie count. Patient ate 50% of dinner. 1930: Bedside shift change report given to Keeley IBARRA Daniel Garrison (oncoming nurse) by Wenceslao Davis RN (offgoing nurse). Report included the following information SBAR and Kardex. Problem: Falls - Risk of  Goal: *Absence of Falls  Description  Document Ashlie Colón Fall Risk and appropriate interventions in the flowsheet.   Outcome: Progressing Towards Goal  Note: Fall Risk Interventions:  Mobility Interventions: Communicate number of staff needed for ambulation/transfer, OT consult for ADLs, Patient to call before getting OOB, PT Consult for mobility concerns    Mentation Interventions: Door open when patient unattended    Medication Interventions: Evaluate medications/consider consulting pharmacy, Patient to call before getting OOB    Elimination Interventions: Call light in reach    History of Falls Interventions: Consult care management for discharge planning, Door open when patient unattended, Evaluate medications/consider consulting pharmacy, Investigate reason for fall, Room close to nurse's station, Utilize gait belt for transfer/ambulation, Assess for delayed presentation/identification of injury for 48 hrs (comment for end date)         Problem: Patient Education: Go to Patient Education Activity  Goal: Patient/Family Education  Outcome: Progressing Towards Goal     Problem: Pressure Injury - Risk of  Goal: *Prevention of pressure injury  Description  Document Mich Scale and appropriate interventions in the flowsheet. Outcome: Progressing Towards Goal  Note: Pressure Injury Interventions:  Sensory Interventions: Assess changes in LOC, Assess need for specialty bed, Float heels, Keep linens dry and wrinkle-free, Pressure redistribution bed/mattress (bed type), Turn and reposition approx.  every two hours (pillows and wedges if needed)    Moisture Interventions: Check for incontinence Q2 hours and as needed    Activity Interventions: Increase time out of bed, Pressure redistribution bed/mattress(bed type), PT/OT evaluation    Mobility Interventions: Pressure redistribution bed/mattress (bed type), HOB 30 degrees or less, PT/OT evaluation    Nutrition Interventions: Document food/fluid/supplement intake    Friction and Shear Interventions: Apply protective barrier, creams and emollients, Feet elevated on foot rest, Foam dressings/transparent film/skin sealants, HOB 30 degrees or less, Lift sheet, Lift team/patient mobility team, Minimize layers, Sit at 90-degree angle                Problem: Heart Failure: Discharge Outcomes  Goal: *Demonstrates ability to perform prescribed activity without shortness of breath or discomfort  Outcome: Progressing Towards Goal

## 2020-01-02 NOTE — PROGRESS NOTES
Problem: Self Care Deficits Care Plan (Adult)  Goal: *Acute Goals and Plan of Care (Insert Text)  Description    FUNCTIONAL STATUS PRIOR TO ADMISSION: Patient was modified independent using a single point cane for functional mobility. Patient able to shower and dress himself. However, patient required assistance for household management from his wife. HOME SUPPORT: The patient lived alone with wife to provide assistance. Occupational Therapy Goals:    Goals reviewed and continued/modified as follows 12/26/2019  1. Patient will perform upper body dressing moderate assistance within 7 day(s) including management of LVAD. 2.  Patient will perform lower body dressing with minimal assistance within 7 day(s). (able to lat[place] socks in bed, needs MOD A for dynamic standing balance)  3. Patient will perform grooming seated EOB with supervision/setup within 7 day(s). 4.  Patient will perform toilet transfers with minimum assistance within 7 day(s). -CONTINUE  5. Patient will perform all aspects of toileting with moderate assistance within 7 day(s). -CONTINUE  6. Patient will participate in upper extremity therapeutic exercise/activities with supervision/set-up-min A for 5 minutes within 7 day(s). -GOAL MET (discontinue)  7. Patient will utilize energy conservation techniques during functional activities with verbal cues within 7 day(s). 8. Patient will verbalize 3/5 LVAD components with min verbal cues within 7 day (s). -CONTINUE    Goals reviewed and continued 12/19/2019  OT weekly reassessment 12/6/19: goals modified  1. Patient will perform upper body dressing moderate assistance within 7 day(s). 2.  Patient will perform lower body dressing with moderate assistance within 7 day(s). 3.  Patient will perform grooming seated EOB with minimum assistance within 7 day(s). 4.  Patient will perform toilet transfers with minimum assistance within 7 day(s).   5.  Patient will perform all aspects of toileting with moderate assistance within 7 day(s). 6.  Patient will participate in upper extremity therapeutic exercise/activities with supervision/set-up-min A for 5 minutes within 7 day(s). 7.  Patient will utilize energy conservation techniques during functional activities with verbal cues within 7 day(s). 8. Patient will verbalize 3/5 LVAD components with min verbal cues within 7 day (s). OT weekly reassessment 11/29/19: goals modified below  1. Patient will perform upper body dressing including LVAD switchovers with moderate assistance within 7 day(s). 2.  Patient will perform lower body dressing with minimal assistance within 7 day(s). 3.  Patient will perform grooming in standing with minimum assistance within 7 day(s). 4.  Patient will perform toilet transfers with minimum assistance within 7 day(s). 5.  Patient will perform all aspects of toileting with minimal assistance within 7 day(s). 6.  Patient will participate in upper extremity therapeutic exercise/activities with supervision/set-up for 5 minutes within 7 day(s). 7.  Patient will utilize energy conservation techniques during functional activities with verbal cues within 7 day(s). 8. Patient will verbalize LVAD terminology with verbal cues within 7 day (s). Goals reviewed and continued/modified as follows 11/20/19 s/p LVAD implantation  1. Patient will perform upper body dressing including LVAD switchovers with moderate assistance within 7 day(s). 2.  Patient will perform lower body dressing with minimal assistance within 7 day(s). 3.  Patient will perform grooming with supervision/setup within 7 day(s). 4.  Patient will perform toilet transfers supervision/setupmodified independence within 7 day(s). 5.  Patient will perform all aspects of toileting with minimal assistance within 7 day(s). 6.  Patient will participate in upper extremity therapeutic exercise/activities with supervision/set-up for 5 minutes within 7 day(s).     7. Patient will utilize energy conservation techniques during functional activities with verbal cues within 7 day(s). 8. Patient will verbalize LVAD terminology with verbal cues within 7 day (s). Goals reviewed and continued/modified as follows 11/12/19  1. Patient will perform bathing with supervision/set-up within 7 day(s). 2.  Patient will perform lower body dressing with supervision/set-up within 7 day(s). 3.  Patient will perform grooming with modified independence within 7 day(s). 4.  Patient will perform toilet transfers with modified independence within 7 day(s). 5.  Patient will perform all aspects of toileting with supervision/set-up within 7 day(s). 6.  Patient will participate in upper extremity therapeutic exercise/activities with supervision/set-up for 5 minutes within 7 day(s). 7.  Patient will utilize energy conservation techniques during functional activities with verbal cues within 7 day(s). 8. Patient will verbalize LVAD terminology with verbal cues within 7 day (s) in preparation for implant. Continue all goals 10/30/19 post re-eval for Impella removal   Initiated 10/28/2019  1. Patient will perform bathing with supervision/set-up within 7 day(s). 2.  Patient will perform lower body dressing with supervision/set-up within 7 day(s). 3.  Patient will perform grooming with modified independence within 7 day(s). 4.  Patient will perform toilet transfers with modified independence within 7 day(s). 5.  Patient will perform all aspects of toileting with supervision/set-up within 7 day(s). 6.  Patient will participate in upper extremity therapeutic exercise/activities with supervision/set-up for 5 minutes within 7 day(s). 7.  Patient will utilize energy conservation techniques during functional activities with verbal cues within 7 day(s).                   Outcome: Progressing Towards Goal   OCCUPATIONAL THERAPY TREATMENT  Patient: Aaron Martinez (75 y.o. male)  Date: 1/2/2020  Diagnosis: Acute decompensated heart failure ([facility] [address] [I50.9]   <principal problem not specified>  Procedure(s) (LRB):  LEFT BRACHIAL THROMBECTOMY (Left) 38 Days Post-Op  Precautions: Fall, Sternal(LVAD )  Chart, occupational therapy assessment, plan of care, and goals were reviewed. ASSESSMENT  Patient continues with skilled OT services and is progressing towards goals. Patient with improved arousal with continued fatigue , able to identify switchover parts of LVAD in prep for change over trial at future session, patient' wife brought in fisherman's vest to be assessed next check off. Encouraged sponges for  and pinch strengthening. Fatigued and resting rest.     Current Level of Function Impacting Discharge (ADLs): will determine next session    Other factors to consider for discharge: fishermans vest in room for trial         PLAN :  Patient continues to benefit from skilled intervention to address the above impairments. Continue treatment per established plan of care. to address goals. Recommend with staff: bed-chair positioning    Recommend next OT session: assess fisherman's vest, continue LVAD part verbalizing    Recommendation for discharge: (in order for the patient to meet his/her long term goals)  To be determined: working up to 3h toleration     This discharge recommendation:  Has not yet been discussed the attending provider and/or case management    IF patient discharges home will need the following DME: to be determined (TBD)       SUBJECTIVE:   Patient stated I am just so tired.     OBJECTIVE DATA SUMMARY:   Cognitive/Behavioral Status:  Neurologic State: Alert  Orientation Level: Oriented X4  Cognition: Appropriate for age attention/concentration             Functional Mobility and Transfers for ADLs:  Bed Mobility:       Transfers:             Balance:       ADL Intervention:        Patient recalled VAD equipment in prep for ADL routine with VCs for assist:   Item Correct Identification Location Function Comments    yes      Emergency Bag       Battery Clips yes      Display Module yes      Battery Charger       Power Module       Battery  yes      Drive Line yes      Power Leads yes      Battery Holster       Holster Vest yes In room        Patient instructed and indicated understanding energy conservation techniques to increase independence and safety during ADLs with none visual handout provided. Increase activity tolerance for home, work, and sexual intercourse by pacing self with increasing the Hand and PINCH exercises, sitting duration, frequency OOB, walking, standing, and ADLs. Instructed and indicated understanding of s/s of too much activity, how to respond to s/s safely. Therapeutic Exercises:       Pain:  None noted    Activity Tolerance:   Poor  Please refer to the flowsheet for vital signs taken during this treatment.     After treatment patient left in no apparent distress:   Supine in bed and Call bell within reach    COMMUNICATION/COLLABORATION:   The patients plan of care was discussed with: Physical Therapist and Registered Nurse    Chato Raphael OT  Time Calculation: 9 mins

## 2020-01-02 NOTE — PROGRESS NOTES
NUTRITION COMPLETE ASSESSMENT    ADDENDUM:   Order for DHT entered by provider pt pt declining placement. Pt and wife visited to discussed nutrition and tube feeds:    Discussed poor PO intake over extended period of time along with increased nutrition needs for recovery. Importance of good PO intake or else need for DHT reviewed. Pt still declining tube but agreeable to making some adjustments to supplements and Calorie Count to make true effort to increasing PO intake over the weekend. If oral intake continues to be poor over the weekend will readdress the topic of feeding tube with patient. Interventions:    - CALORIE COUNT X 3 DAYS (1/3/20 through 1/5/20)  - Ensure Enlive TID (chocolate) - pt to drink between meals  - Cafeteria menu to be provided  - reviewed patient menu for food preferences  - snacks adjusted (cottage cheese and fruit)  Family also planning on bringing foods from home. RECOMMENDATIONS:   1. If to continue aggressive care, replace DHT for supplemental feeds: TwoCal HN @ 60ml/hr x 12hrs (7p to 7a) + 30ml flush q4hr during feeds   - less than 50-75% needs for 2+ weeks   - add 100mg thiamine daily; add daily MVI  2. Continue to encourage oral intake - family may bring foods from home   3. Miladis Grey since not at therapeutic dose to avoid polypharmacy    Please document  Severe Acute on Chronic Protein Calorie Malnutrition in patient diagnoses. Patient meets criteria for as evidenced by:   ASPEN Malnutrition Criteria  Acute Illness, Chronic Illness, or Social/Enviornmental: Acute illness  Energy Intake: <75% est energy req for > 7 days  Weight Loss: Greater than 7.5% x 3 mos  Body Fat Loss: Moderate  Muscle Mass Loss: Moderate  Fluid Accumulation: Moderate  ASPEN Malnutrition Score - Acute Illness: 19  Acute Illness - Malnutrition Diagnosis: Severe malnutrition.       Interventions/Plan:   Food/Nutrient Delivery:  (snacks, preferences noted) Commercial supplement(see below) (increase marinol dose) (-)    Assessment:   Reason for Assessment: Reassessment    Diet: regular + snack  Supplements: Ensure Enlive 1x/day and Ensure Compact 1x/day   Nutritionally Significant Medications: [x] Reviewed & Includes: allopurinol, cefepime, retacrit, marinol (2.5mg daily), lexapro (started 12/19), SSI, Bhakti-Q, keppra, levaquin, mag ox, midodrine, octreotide, protonix, miralax, KCl, pericolace, spironolactone, carafate, MVI, coumadin, artifical saliva PRN, zofran PRN  Meal intake:   Patient Vitals for the past 168 hrs:   % Diet Eaten   01/02/20 1058 75 %   01/02/20 0741 25 %   01/01/20 1211 25 %   01/01/20 0809 50 %   12/31/19 1504 50 %   12/31/19 1308 50 %   12/31/19 0745 25 %   12/30/19 1338 50 %   12/30/19 0801 50 %   12/29/19 0912 80 %   12/28/19 1803 75 %   12/28/19 0939 80 %     Subjective: Pt sleeping soundly x 2 attempts. RN reports eating only 50% of lunch and even less of breakfast today. Objective:  Pt admitted for CHF. PMHx: HTN, CKD, chronic systolic heart failure, depression, others noted. S/p removal of impella (placed 10/29) and LVAD placement on 11/18. Bacteremia with likely urinary source noted, abx rx. Lethargic today. Milrinone drip off, midodrine rx. Low sodium and chloride continue but stable. Renal following for JUAN J on CKD. Lexapro (started 12/19) but changed to effexor due to hyponatremia. Remeron discontinued d/y confusion and marinol with progressively reduced dose with concerns of causing lethargy noted, now only getting 2.5mg per day. Question benefit of low dose since no improvement in intake so recommend discontinuing. Pt with DHT earlier this admit 12/6-12/15 when it was removed due to nosebleed. Oral intake has been less than 75% needs for past 2 weeks with no improvements noted. Do not predict oral intake to improve significant and question ability to progress/recover without adequate nutrition.  Discussed more aggressive nutrition with NP via phone - PEG would likely not be an option but DHT may be considered. If to continue agressive care, recommend NGT with supplemental feeds. Would recommend nocturnal feeds to meet ~75% needs with: TwoCal HN @ 60ml/hr x 12hrs (7p to 7a) + 30ml flush q4hr during feeds. [Provides: 720ml, 1440kcal, 60g protein, 532ml free fluid + 120ml flush]. Supplement frequency reduced last week since pt not drinking: Ensure Enlive (350kcal 20g protein) and Ensure Compact (220kcal, 9g protein). Intake poor with trays and e tamir foods brought from home. Weekly PAB being checked but not indicator of nutrition status with pro-inflammatory condition of CHF. Do not recommend checking as a way to evaluate nutrition. Does however meet criteria for severe malnutrition with: (1) Severe wt loss over past 6 months (2) Severe muscle/fat wasting (3) Inadequate oral intake. Last 3 Recorded Weights in this Encounter    12/31/19 0314 01/01/20 0408 01/02/20 0425   Weight: 74 kg (163 lb 2.3 oz) 79.7 kg (175 lb 11.3 oz) 78.7 kg (173 lb 8 oz)     Estimated Nutrition Needs:   Kcals/day: 2045 Kcals/day(1887-2045kcal)  Protein: 75 g(75-90g (1-1.2g/kg - CKD))  Fluid: 1887 ml(1ml/kcal)  Based On: [place] Jeor(x 1.2-1.3)  Weight Used: Actual wt(74.7kg)    Pt expected to meet estimated nutrient needs:  []   Yes []  No [x] Unable to predict at this time  Nutrition Diagnosis:   1. Malnutrition related to CHF vs depression vs malignancy as evidenced by >10% weight loss x 6 months; severe muscle/fat wasting; consuming >70% needs orally    2. Inadequate oral intake related to poor appetite as evidenced by less than 50% most meals consumed; only 1-2 supplements/day    3.  Swallowing difficulty(improved) related to weakness with moderate pharyngeal dysphagia as evidenced by regular texture; nectar-thick liquids    Goals:     Consumption of at least 60% meals and 2-3 snacks/supplements/day in 5-7 days; wt maintenance     Monitoring & Evaluation:    - Total energy intake, Liquid meal replacement, Protein intake   - Weight/weight change     Previous Nutrition Goals Met:   No  Previous Recommendations:    No    Education & Discharge Needs:   [x] None Identified   [] Identified and addressed    [x] Participated in care plan, discharge planning, and/or interdisciplinary rounds        Cultural, [employer] and ethnic food preferences identified: None    Skin Integrity: []Intact  [x]Other: sternal wound  Edema: []None [x]Other: trace  Last BM: 12/25  Food Allergies: [x]None []Other  Diet Restrictions: Cultural/Ep[employer]opalian Preference(s): None     Anthropometrics:    Weight Loss Metrics 1/2/2020 10/25/2019 10/25/2019 10/25/2019 9/30/2019 9/18/2019 9/16/2019   Today's Wt 173 lb 8 oz - 193 lb 6.4 oz - 199 lb 14.4 oz 196 lb 199 lb   BMI - 22.28 kg/m2 - 24.83 kg/m2 25.67 kg/m2 25.16 kg/m2 25.55 kg/m2      Weight Source: Bed  Height: 6' 2\" (188 cm),    Body mass index is 22.28 kg/m².      IBW : 86.2 kg (190 lb), % IBW (Calculated): 96.32 %   ,      Labs:    Lab Results   Component Value Date/Time    Sodium 129 (L) 01/02/2020 04:36 AM    Potassium 4.3 01/02/2020 04:36 AM    Chloride 96 (L) 01/02/2020 04:36 AM    CO2 27 01/02/2020 04:36 AM    Glucose 104 (H) 01/02/2020 04:36 AM    BUN 21 (H) 01/02/2020 04:36 AM    Creatinine 1.15 01/02/2020 04:36 AM    Calcium 8.9 01/02/2020 04:36 AM    Magnesium 1.9 01/02/2020 04:36 AM    Phosphorus 3.3 11/27/2019 04:18 AM    Albumin 2.6 (L) 01/02/2020 04:36 AM     Lab Results   Component Value Date/Time    Hemoglobin A1c 6.6 (H) 10/25/2019 02:56 PM       Anny Moran, RD [address] [place] , Pager #[phone] or [phone]

## 2020-01-02 NOTE — PROGRESS NOTES
Cefepime monitoring  Indication: bacteremia  Current regimen:  2 gm IV q24h    Recent Labs     20  0436 20  0414 19  0323   WBC 7.5 7.6 8.4   CREA 1.15 1.35* 1.59*   BUN 21* 21* 20     Est CrCl: ~65 ml/min  Temp (24hrs), Av.6 °F (37 °C), Min:98.1 °F (36.7 °C), Max:99.5 °F (37.5 °C)      Plan: Change to 2 gm IV q8h for crcl > 60 per protocol

## 2020-01-02 NOTE — PROGRESS NOTES
ID Progress Note  2020    Subjective:     Doing ok, seems to be feeling stronger    Objective:     Antibiotics:  1. Levaquin  2. Rifampin   3. Fluconazole  4. Vancomycin    5. Cefazolin   6. Zosyn       Vitals:   Visit Vitals  BP 91/72 (BP 1 Location: Left arm, BP Patient Position: At rest)   Pulse 87   Temp 98.6 °F (37 °C)   Resp 18   Ht 6' 2\" (1.88 m)   Wt 78.7 kg (173 lb 8 oz)   SpO2 99%   BMI 22.28 kg/m²        Tmax:  Temp (24hrs), Av.6 °F (37 °C), Min:98.1 °F (36.7 °C), Max:99.5 °F (37.5 °C)      Exam:  Lungs:  clear to auscultation bilaterally  Heart:  regular rate and rhythm  Abdomen:  soft, non-tender. Bowel sounds normal. No masses,  no organomegaly      Labs:      Recent Labs     20  0436 20  1747 20  0414 19  1618 19  1525 19  0323   WBC 7.5  --  7.6  --   --  8.4   HGB 8.2* 8.4* 8.2* 8.0* 6.3* 8.4*   *  --  133*  --   --  140*   BUN 21*  --  21*  --   --  20   CREA 1.15  --  1.35*  --   --  1.59*   SGOT 20  --  21  --   --  21   AP 98  --  100  --   --  106   TBILI 0.8  --  1.0  --   --  1.0       Cultures:     No results found for: SDES  Lab Results   Component Value Date/Time    Culture result: LIGHT NORMAL RESPIRATORY TIAN 2019 04:18 PM    Culture result: (A) 2019 03:24 PM     PROBABLE PSEUDOMONAS SPECIES GROWING IN 2 OF 4 BOTTLES DRAWN SITE = RFA AND L WRIST    Culture result: (A) 2019 03:24 PM     PRELIMINARY REPORT OF GRAM NEGATIVE RODS GROWING IN 1 OF 4 BOTTLES DRAWN CALLED TO AND READ BACK BY Benito Gale RN AT 6887 ON 20 RB    Culture result: (A) 2019 03:24 PM     PRELIMINARY REPORT OF GRAM NEGATIVE RODS GROWING IN 2ND OF 4 BOTTLES DRAWN (DIFFERENT SITE=L WRIST) CALLED TO AND READ BACK BY Benito Gale RN AT 15:51 ON 20 BY [facility]. Culture result: REMAINING BOTTLE(S) HAS/HAVE NO GROWTH SO FAR 2019 03:24 PM       Radiology:     Line/Insert Date:           Assessment:     1.  UTI--Serratia (2 biotypes) from culture and small amount of Enterococcus--now with Pseudomonas from culture of urine and blood  2. CHF/cardiomyopathy  3. ?aspiration event(s)  4. Renal insufficiency  5. Respiratory difficulties    Objective:     1.  Add quinolone      Ferny Ruvalcaba MD

## 2020-01-03 NOTE — DIABETES MGMT
[facility]    [facility] Cardiac Surgery Note  Follow Up    Recommendations/ Comments: BG's remain < 140 mg/dL has required no lispro correction   Pt is s/p LVAD placement 11/18/2019. Had brachial thrombectomy 11/26/19. Pt returned to CVICU 12/4/2019 due to respiratory distress. PO intake improving somewhat varies - 25 - 75%    Current hospital DM medication: lispro correction scale, normal sensitivity    Chart reviewed and initial evaluation complete on Sona Kiser. Patient is a 71 y.o. male with no prior hx. Recent A1c suggestive of new dx. DTC saw pt for education regarding new diagnosis on 11/27/2019. May need review closer to discharge      A1c:   Lab Results   Component Value Date/Time    Hemoglobin A1c 6.6 (H) 10/25/2019 02:56 PM                  Recent Glucose Results:   Lab Results   Component Value Date/Time    GLU 97 01/03/2020 03:51 AM    GLUCPOC 103 (H) 01/03/2020 07:32 AM    GLUCPOC 112 (H) 01/02/2020 09:35 PM    GLUCPOC 99 01/02/2020 04:49 PM        Lab Results   Component Value Date/Time    Creatinine 1.02 01/03/2020 03:51 AM     Estimated Creatinine Clearance: 73.9 mL/min (based on SCr of 1.02 mg/dL). Active Orders   Diet    DIET REGULAR        PO intake:   Patient Vitals for the past 72 hrs:   % Diet Eaten   01/02/20 1505 50 %   01/02/20 1058 75 %   01/02/20 0741 25 %   01/01/20 1211 25 %   01/01/20 0809 50 %   12/31/19 1504 50 %   12/31/19 1308 50 %     Will continue to follow as needed. Thank you.   Tani Christina RN, [address]

## 2020-01-03 NOTE — NURSE NAVIGATOR
HF NNs attempted Cardiomems readings. The reading that transmitted was labeled \"suspect\" by the secure [employer]. The waveform is poor. The signal was not above 50% for ten seconds as is required for accurate reading. Primo MAYORGA and Luis Miguel Bains NP notified. This reading labeled suspect by [employer], and cannot be viewed as accurate.

## 2020-01-03 NOTE — PROGRESS NOTES
Problem: Mobility Impaired (Adult and Pediatric)  Goal: *Acute Goals and Plan of Care (Insert Text)  Description  FUNCTIONAL STATUS PRIOR TO ADMISSION: Patient was modified independent using a single point cane for functional mobility. Patient reports an increasingly sedentary lifestyle 2* fatigue and SOB/dyspnea. Retired (so is his wife). Patient reports x 3 falls within the last couple of weeks. Patient is wearing either nasal cannula or CPAP at night. LVAD work-up has been initiated. HOME SUPPORT PRIOR TO ADMISSION: The patient lived with his wife, but did not require physical assistance. Physical Therapy Goals:    Reassessed on 1/2/2020 and goals not met, carry-over. Reassessed on 12/26/2019, goals not met but remain appropriate, so carry over  Weekly reassessment completed 12/19/2019 and goals downgraded as appropriate. 1.  Patient will move from supine to sit and sit to supine, scoot up and down and roll side to side in bed with contact guard assistance within 7 days. 2.  Patient will perform sit to/from stand with minimal assistance x 1 within 7 days. 3.  Patient will ambulate 25 feet with least restrictive assistive device and moderate assistance within 7 days. 4.  Stair goal to be formulated when appropriate. 5.  Patient will perform cardiac exercises per protocol with supervision within 7 days. 6.  Patient will verbally and functionally recall mindful movement principles (Move in the Tube) with minimal verbal cuing/reminding within 7 days. 7.  Patient will perform power exchange for power module to/from battery with moderate assistance within 7 days. Re-evaluation completed 11/20/2019 and new goals formulated following LVAD implantation. Reviewed and cont 12/3/2019. Reviewed and continued 12/12/2019  1. Patient will move from supine to sit and sit to supine, scoot up and down and roll side to side in bed with minimal assistance/contact guard assist within 7 days.     2.  Patient will perform sit to/from stand with minimal assistance/contact guard assist within 7 days. 3.  Patient will ambulate 150 feet with least restrictive assistive device and minimal assistance/contact guard assist within 7 days. 4.  Patient will ascend/descend 4 stairs with handrail(s) with minimal assistance/contact guard assist within 7 days. 5.  Patient will perform cardiac exercises per protocol with supervision within 7 days. 6.  Patient will verbally and functionally recall 3/3 sternal precautions within 7 days. 7.  Patient will perform power exchange for power module to/from battery with moderate assistance  within 7 days. Initiated 10/27/2019; updated 11/15/2019   1. Patient will move from supine to sit and sit to supine, scoot up and down and roll side to side in bed with independence within 7 days. 2.  Patient will perform sit to/from stand with supervision/set-up within 7 days. 3.  Patient will ambulate 200 feet with least restrictive assistive device and supervision/set-up within 7 days. 4.  Patient will ascend/descend 4 stairs with  handrail(s) with supervision/set-up within 7 days for functional strengthening and community reintegration. .  5.  Patient will verbally and functionally recall 3/3 sternal precautions within 7 days in preparation for LVAD implantation. 6.  Patient will perform a mock power exchange for power module to/from battery with supervision/set-up within 7 days in preparation for LVAD implantation. 1.  Patient will move from supine to sit and sit to supine, scoot up and down and roll side to side in bed with independence within 7 days. 2.  Patient will perform sit to/from stand with supervision/set-up within 7 days. 3.  Patient will ambulate 150 feet with least restrictive assistive device and supervision/set-up within 7 days.    4.  Patient will ascend/descend 4 stairs with  handrail(s) with supervision/set-up within 7 days for functional strengthening and community reintegration. .  5.  Patient will verbally and functionally recall 3/3 sternal precautions within 7 days in preparation for LVAD implantation. 6.  Patient will perform a mock power exchange for power module to/from battery with supervision/set-up within 7 days in preparation for LVAD implantation. Outcome: Progressing Towards Goal    PHYSICAL THERAPY TREATMENT  Patient: Reid Baumann (74 y.o. male)  Date: 1/3/2020  Diagnosis: Acute decompensated heart failure ([facility] [address] [I50.9]   <principal problem not specified>  Procedure(s) (LRB):  LEFT BRACHIAL THROMBECTOMY (Left) 39 Days Post-Op  Precautions: Fall, Sternal(LVAD )  Chart, physical therapy assessment, plan of care and goals were reviewed. ASSESSMENT  Patient continues with skilled PT services and is progressing towards goals. Pt eager for OOB. Pt initially felt bad with sitting but improved with increase time. Pt was able to transfer to the chair, with initially feeling poor but improved with elevating LE and time. Supine MAP 96  Sitting MAP61  Sitting post 5min MAP 70  In chair reclined MAP 80    Current Level of Function Impacting Discharge (mobility/balance): mod to min A x2 transfer only    Other factors to consider for discharge: decrease mobility, variable MAPs         PLAN :  Patient continues to benefit from skilled intervention to address the above impairments. Continue treatment per established plan of care. to address goals. Recommendation for discharge: (in order for the patient to meet his/her long term goals)  Therapy 3 hours per day 5-7 days per week    This discharge recommendation:  Has not yet been discussed the attending provider and/or case management    IF patient discharges home will need the following DME: to be determined (TBD)       SUBJECTIVE:   Patient stated I need to do it.     OBJECTIVE DATA SUMMARY:   Critical Behavior:  Neurologic State: Alert  Orientation Level: Oriented X4  Cognition: Appropriate for age attention/concentration  Safety/Judgement: Decreased insight into deficits, Decreased awareness of need for safety, Decreased awareness of need for assistance  Functional Mobility Training:  Bed Mobility:                    Transfers:  Sit to Stand: Moderate assistance  Stand to Sit: Moderate assistance        Bed to Chair: Minimum assistance;Assist x2                    Balance:  Standing: Impaired  Standing - Static: Fair  Standing - Dynamic : Poor  Ambulation/Gait Training:                                                        Stairs: Therapeutic Exercises: Ankle pumps  Pain Rating:      Activity Tolerance:   limited  Please refer to the flowsheet for vital signs taken during this treatment.     After treatment patient left in no apparent distress:   Sitting in chair, Heels elevated for pressure relief, Call bell within reach, and reclined     COMMUNICATION/COLLABORATION:   The patients plan of care was discussed with: Registered Nurse    Ada Boucher, SHANTI   Time Calculation: 38 mins

## 2020-01-03 NOTE — PROGRESS NOTES
1950: Bedside shift change report given to Sly Sunshine (oncoming nurse) by Willian Dominguez (offgoing nurse). Report included the following information SBAR, Intake/Output, MAR, Accordion, Recent Results, Med Rec Status and Cardiac Rhythm Paced. [phone]: Patient placed on home CPAP with 1L Oxygen bled in; encouraged patient to try to wear CPAP as much as possible overnight. 0200: Patient removed CPAP, placed back on 1L NC per his request    0745: Bedside shift change report given to Willian Dominguez (oncoming nurse) by Sly Sunshine (offgoing nurse). Report included the following information SBAR, Intake/Output, MAR, Accordion, Recent Results, Med Rec Status and Cardiac Rhythm NSR, 1st AVB, intermittent pacing.

## 2020-01-03 NOTE — PROGRESS NOTES
[facility] in [place], [place]  Inpatient Progress Note      Patient name: Aydee Diaz  Patient : 1950  Patient MRN: [id number]  Attending MD: Soledad Sosa MD  Date of service: 20    Chief Complaint:   Haris Croft azucena LVAD implant     HPI: Sona Kiser is a 71y.o. year old pleasant white male with a history of HTN, HLD, JOSE, CAD s/p cardiac arrest VFib s/p CABG () c/b sternal would infection and sternectomy, ischemic cardiomyopathy LVEF 15-20%, s/p ICD and with LBBB. He was admitted with acute on chronic systolic heart failure with massive volume overload > 20 lbs, in the setting of atrial fibrillation s/p failed DCCV and underwent DCCV and RHC on .  S/p BiVICD on 19 with Dr. Odilon Rogers. He was discharged home home on IV milrinone on 19. Jordi Combs has been followed closely by Dr. Sofie Keenan and the [facility].     Mr. Kiser was admitted for acute on chronic systolic heart failure. He underwent implantation of Impella 5.0 due to CS and has completed his LVAD evaluation. Jordi Combs meets criteria for implant of HM3 as DT. He was NYHA Class IV prior to implant of Impella 5.0, has LVEF < 15%, was intolerant of GDMT due to symptomatic hypotension and renal dysfunction. He remains dependent on temporary MCS support. RHC  revealed compensated hemodynamics on Impella. His renal function has improved dramatically with improvement in his hemodynamics. He is not a suitable transplant candidate due to single kidney, sternectomy, debility, and frailty. He was reviewed by Eric Eaton and felt to be a high risk heart transplant candidate due to multiple co-morbidities as well as the afore mentioned conditions.  He remained in the CCU and underwent a HM 3 implant as DT on .   He was weaned off of pressors and transferred to [place] [address] where he continues to undergo PT/OT and hemodynamic optimization.       24Hr Events  2/4 Blood cultures positive for gram negative rods  Milrinone gtt off  Started midodrine    Procedure:  Procedure(s):  REMOVAL TEMPORARY LEFT VENTRICULAR ASSIST DEVICE, IMPLANTATION OF LEFT VENTRICULAR ASSIST DEVICE PERMANENT (VAD), ECC, JACQUE, EPI AORTIC US BY DR Viridiana Collado.     POD:  45     Impression / Plan:   Heart Failure Status: NYHA Class IV    Chronic systolic heart failure due to ICM, NYHA Class IV, EF < 15%, cardiogenic shock bridged with Impella 5.0 support to HM 3 implant   S/p Impella removal and LVAD implant 11/18/19  RPMs 6400 rpm - frequent PI events  TTE with bubble study negative for PFO  Obtain CardioMEMS readings daily- labeled \"suspect\" per [employer] website/unreliable  Holding diuretics  Encourage PO fluids  Continue Sildenafil 10 mg PO TID- rx sent to local pharmacy to initiate PA   Intolerant of BB due to RV failure  Intolerant of ACEi/ARB/ARNI/AA due to JUAN J on CKD 3  Continue midodrine 2.5mg po BID for orthostatic hypotension  Daily orthostatics  Strict I/O, daily weights  Continue LVAD education   Dressing change frequency three times weekly, Sutures removed 12/26/19  Delayed skin healing on bottom portion of sternotomy- evaluated by CTS, no intervention   TTE 12/16- EF 15-20%    Bacteremia - Pseudomonas  Blood cultures (12/31/19) 2/4 bottles +Pseudomonas & 2/4 bottles GNRs    Likely d/t UTI (Pseudomonas)  On IV Cefepime and Levaquin   Follow procalcitonin and lactic acid levels    Generalized myoclonus   Improved   Appreciate Neurology input  Decreased Keppra due to somnolence - 125 mg po BID   Head CT negative for acute process  EEG suggestive of mild generalized encephalopathic process, possibly related to underlying structural brain injury vs metabolic abnormality  Monitor closely      Anticoagulation for LVAD, INR goal 1.5-2  Goal decreased d/t persistent hematuria   No ASA d/t hematuria  INR 1.4  Coumadin - 2mg tonight    Epistaxis  Resolved      Acute Respiratory Failure post op  Improved with aggressive diuresis  Off supplemental O2  Pulmonary hygiene   Cont home CPAP- must use while sleeping   Schedule PET CT prior to discharge    Acute on CKD 3, atrophic left kidney  Appreciate Nephrology assistance  Watch Cr, stable  Hold diuretics for positive orthostatics   Avoid nephrotoxins, trend labs   Renally dose meds      CAD s/p CABG   Off ASA d/t hematuria  No BB d/t RV failure  Hold statin due to recent hepatic failure   LHC performed 11/13 - low likelihood of benefit from revascularization      Hx of VF arrest s/p BiV-ICD  No recent shocks  Keep K > 4 and Mg > 2   Mag ox to 800mg po BID    PAF   Dig level 0.6-cont dig (62.5 mcg qMWF)  No BB due to RV failure   Repeat TFTs WNL     Hx of gout  Uric acid WNL    Acute blood loss anemia  Likely due to hematuria  Cont octreotide 25 mcg SQ TID   Fecal occult 12/14 negative, positive on 12/17  Appreciate urology recommendations  Hematuria improved  Monitor for now     Suspected aspiration pneumonia  Sputum culture showing scant growth of yeast  Repeat Sputum culture (12/31/19) light normal resp soren    Urinary retention, c/b serratia UTI and hematuria  Appreciate Urology consult   Repeat UA with cx negative 12/15   Cystoscopy performed 11/13 shows catheter induced cystitis   Pseudomonas aeruginosa positive UA culture (12/31/19) - on Cefepmine    Malnutrition   Consuming less than 50-75% of meals   Prealbumin weekly - 12.6 on 12/30   Appreciate Nutritionist assistance  Diet as tolerated, encourage food from home, protein shakes  Intolerant of mirtazapine d/t tremors, confusion   D/C Marinol d/t polypharmacy  Cont MVI add thiamine 100mg daily   IR to place Dobbhoff for supplemental feeds on Monday    COPD   Appreciate Pulmonology assistance   Continue nebs PRN       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  No additional treatment at this time     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  Etiology unclear  [facility]  Appreciate neurology assistance      Hx of sternal wound infection, sternectomy  Sternum closed post op- monitor Vocal cord paralysis  Improved  ENT recs appreciated  Neck CT- R neck edema, no airway compromise   Speech therapy following - appreciate input    Anxiety  Klonipin 0.5 mg TID prn anxiety    Depression  Stop lexapro d/t hyponatremia   Continue Effexor XR 75mg daily  Monitor QTc - 478 ms on 12/22   Monitor rhythm strips for prolonged QTc     Debility  Continue PT/OT     Bladder spasms  Received pyridium 100mg x4 doses  Oxybutynin 5mg Q6hr prn      Ppx  Protonix   PT/OT   Bowel regimen   PICC placed 11/22/19      Dispo:  Remain in CVSU at this time  Will need IP rehab. Not ready for discharge at this time        Patient seen and examined. Data and note reviewed. I have discussed and agree with the plans as noted. 71year old male with a history of CAD, ICM, s/p LVAD as DT whose post op course has been complicated by urinary retention, UTI, and pseudomonas bacteremia. He remains debilitated and malnourished. Encourage po intake. Plan to replace Dobhoff for nocturnal enteral feedings. Tolerating low dose midodrine d/t orthostasis. Continue PT/OT and LVAD education. Thank you for allowing us to participate in your patient's care. Mounika Joaquin MD, [facility]  Chief of Cardiology, [address] [place] [address], [address] [place] [address]  Office [phone]  Fax [phone]        LVAD INTERROGATION:  Device interrogated in person  Device function normal, normal flow, multiple PI events    LVAD   Pump Speed (RPM): 6400  Pump Flow (LPM): 6  MAP: 80  PI (Pulsitility Index): 3.5  Power: 5.7   Test: No  Back Up  at Bedside & Labeled: Yes  Power Module Test: No  Driveline Site Care: No  Driveline Dressing: Clean, Dry, and Intact  Outpatient: No  MAP in Therapeutic Range (Outpatient): Yes  Testing  Alarms Reviewed: Yes  Back up SC speed: 6400  Back up Low Speed Limit: 6000  Emergency Equipment with Patient?: Yes  Emergency procedures reviewed?: Yes  Drive line site inspected?: Yes  Drive line intergrity inspected?: Yes  Drive line dressing changed?: Yes    PHYSICAL EXAM:  Visit Vitals  BP 91/72 (BP 1 Location: Left arm, BP Patient Position: At rest)   Pulse 84   Temp 97.9 °F (36.6 °C)   Resp 16   Ht 6' 2\" (1.88 m)   Wt 173 lb 8 oz (78.7 kg)   SpO2 98%   BMI 22.28 kg/m²       Physical Exam   Constitutional: He is oriented to person, place, and time. He appears well-developed. He appears cachectic. He appears ill. No distress. HENT:   Head: Normocephalic. Neck: Normal range of motion. Neck supple. No JVD present. Cardiovascular: Normal rate, regular rhythm and normal heart sounds. + VAD hum    Pulmonary/Chest: Effort normal and breath sounds normal. No tachypnea. No respiratory distress. Abdominal: Soft. Bowel sounds are normal. He exhibits no distension. Musculoskeletal: Normal range of motion. General: No edema. Neurological: He is alert and oriented to person, place, and time. Skin: Skin is warm and dry. Psychiatric: He has a normal mood and affect. His speech is normal and behavior is normal.   Nursing note and vitals reviewed. REVIEW OF SYSTEMS:  Review of Systems   Constitutional: Positive for malaise/fatigue. Negative for chills and fever. HENT: Positive for hearing loss. Negative for nosebleeds. Eyes: Negative. Respiratory: Negative for cough and shortness of breath. Mild dyspnea   Cardiovascular: Negative for chest pain, palpitations, orthopnea and leg swelling. Orthostatic   Gastrointestinal: Negative for heartburn, nausea and vomiting. Genitourinary: Positive for dysuria. Negative for hematuria. Bladder spasms    Musculoskeletal: Negative. Neurological: Positive for dizziness and weakness. Negative for headaches. Endo/Heme/Allergies: Does not bruise/bleed easily.        PAST MEDICAL HISTORY:  Past Medical History: Diagnosis Date    Degenerative disc disease, lumbar     Heart failure (HCC)     High cholesterol     Hypertension     Paroxysmal atrial fibrillation ([address] 2019    Spinal stenosis        PAST SURGICAL HISTORY:  Past Surgical History:   Procedure Laterality Date    COLONOSCOPY Left 2019    COLONOSCOPY at bedside performed by Cori Lozano MD at [facility] ENDOSCOPY    HX APPENDECTOMY      HX CORONARY ARTERY BYPASS GRAFT      triple    HX HERNIA REPAIR      HX IMPLANTABLE CARDIOVERTER DEFIBRILLATOR      [place] CARDIOVERSION ELECTIVE ARRHYTHMIA EXTERNAL N/A 6/10/2019    EP CARDIOVERSION performed by Carrol Barker MD at [address] CATH LAB    [place] CARDIOVERSION ELECTIVE ARRHYTHMIA EXTERNAL N/A 2019    EP CARDIOVERSION performed by Bina Ramírez MD at [address] CATH LAB    [place] INSJ/RPLCMT PERM DFB W/TRNSVNS LDS 1/DUAL CHMBR N/A 2019    INSERT ICD BIV MULTI performed by Antonio Darden MD at [address] CATH LAB    [place] TCAT IMPL WRLS P-ART PRS SNR L-T HEMODYN MNTR N/A 2019    IMPLANT HEART FAILURE MONITORING DEVICE performed by Irene Coleman MD at [address] CATH LAB       FAMILY HISTORY:  Family History   Problem Relation Age of Onset    Lung Disease Mother     Hypertension Mother     Arthritis-osteo Mother     Heart Disease Mother     Heart Disease Father     Diabetes Father        SOCIAL HISTORY:  Social History     Socioeconomic History    Marital status:      Spouse name: Not on file    Number of children: Not on file    Years of education: Not on file    Highest education level: Not on file   Tobacco Use    Smoking status: Former Smoker     Last attempt to quit: 2010     Years since quittin.0    Smokeless tobacco: Never Used   Substance and Sexual Activity    Alcohol use: Not Currently     Comment: rarely    Drug use: Never   Other Topics Concern       LABORATORY RESULTS:     Labs Latest Ref Rng & Units 1/3/2020 2020 2020 2020 12/31/2019 12/31/2019 12/31/2019   WBC 4.1 - 11.1 K/uL 5.6 7.5 - 7.6 - - -   RBC 4.10 - 5.70 M/uL 2.97(L) 2.77(L) - 2.79(L) - - -   Hemoglobin 12.1 - 17.0 g/dL 8.5(L) 8.2(L) 8.4(L) 8.2(L) 8.0(L) 6. 3(L) -   Hematocrit 36.6 - 50.3 % 28. 0(L) 26. 0(L) 26. 9(L) 26. 8(L) 25. 8(L) 20. 5(L) -   MCV 80.0 - 99.0 FL 94.3 93.9 - 96.1 - - -   Platelets 040 - 780 K/uL 124(L) 123(L) - 133(L) - - -   Lymphocytes 12 - 49 % - - - - - - -   Monocytes 5 - 13 % - - - - - - -   Eosinophils 0 - 7 % - - - - - - -   Basophils 0 - 1 % - - - - - - -   Albumin 3.5 - 5.0 g/dL 2. 5(L) 2. 6(L) - 2. 8(L) - - -   Calcium 8.5 - 10.1 MG/DL 8.6 8.9 - 8.7 - - -   SGOT 15 - 37 U/L 18 20 - 21 - - -   Glucose 65 - 100 mg/dL 97 104(H) - 96 - - -   BUN 6 - 20 MG/DL 19 21(H) - 21(H) - - -   Creatinine 0.70 - 1.30 MG/DL 1.02 1.15 - 1.35(H) - - -   Sodium 136 - 145 mmol/L 131(L) 129(L) - 132(L) - - -   Potassium 3.5 - 5.1 mmol/L 4.6 4.3 - 4.6 - - -   TSH 0.36 - 3.74 uIU/mL - - - - - - -   LDH 85 - 241 U/L 166 162 - 167 - - 189   CEA ng/mL - - - - - - -   Some recent data might be hidden     Lab Results   Component Value Date/Time    TSH 2.30 12/03/2019 03:29 AM    TSH 2.40 10/25/2019 07:39 PM    TSH 2.45 06/01/2019 04:16 AM       ALLERGY:  No Known Allergies     CURRENT MEDICATIONS:  Current Facility-Administered Medications   Medication Dose Route Frequency    warfarin (COUMADIN) tablet 2 mg  2 mg Oral ONCE    thiamine HCL (B-1) tablet 100 mg  100 mg Oral DAILY    venlafaxine-SR (EFFEXOR-XR) capsule 75 mg  75 mg Oral DAILY WITH BREAKFAST    midodrine (PROAMITINE) tablet 2.5 mg  2.5 mg Oral BID WITH MEALS    levoFLOXacin (LEVAQUIN) 750 mg in D5W IVPB  750 mg IntraVENous Q24H    cefepime (MAXIPIME) 2 g in 0.9% sodium chloride (MBP/ADV) 100 mL  2 g IntraVENous Q8H    sildenafil (pulm.hypertension) (REVATIO) tablet 10 mg  10 mg Oral TID    oxybutynin (DITROPAN) tablet 5 mg  5 mg Oral Q6H PRN    [Held by provider] bumetanide (BUMEX) injection 1 mg  1 mg IntraVENous BID    magnesium oxide (MAG-OX) tablet 800 mg  800 mg Oral BID    [Held by provider] milrinone (PRIMACOR) 20 MG/100 ML D5W infusion  0.125 mcg/kg/min IntraVENous CONTINUOUS    albumin human 5% (BUMINATE) solution 12.5 g  12.5 g IntraVENous DIALYSIS PRN    lidocaine (URO-JET/ GLYDO) 2 % jelly   Urethral PRN    senna-docusate (PERICOLACE) 8.6-50 mg per tablet 1 Tab  1 Tab Oral DAILY    epoetin yvonne-epbx (RETACRIT) injection 20,000 Units  20,000 Units SubCUTAneous Q MON, WED & FRI    levETIRAcetam (KEPPRA) tablet 125 mg  125 mg Oral BID    polyethylene glycol (MIRALAX) packet 17 g  17 g Oral DAILY    albuterol-ipratropium (DUO-NEB) 2.5 MG-0.5 MG/3 ML  3 mL Nebulization Q6H PRN    therapeutic multivitamin (THERAGRAN) tablet 1 Tab  1 Tab Oral DAILY    potassium chloride SR (KLOR-CON 10) tablet 40 mEq  40 mEq Oral DAILY    alteplase (CATHFLO) 1 mg in sterile water (preservative free) 1 mL injection  1 mg InterCATHeter PRN    finasteride (PROSCAR) tablet 5 mg  5 mg Oral DAILY    spironolactone (ALDACTONE) tablet 25 mg  25 mg Oral DAILY    clonazePAM (KlonoPIN) tablet 0.5 mg  0.5 mg Oral TID PRN    diphenhydrAMINE (BENADRYL) capsule 25 mg  25 mg Oral QHS PRN    octreotide (SANDOSTATIN) injection 25 mcg  25 mcg SubCUTAneous TID    benzocaine-menthol (CEPACOL) lozenge 1 Lozenge  1 Lozenge Mucous Membrane PRN    digoxin (LANOXIN) tablet 0.0625 mg  62.5 mcg Oral Q MON, WED & FRI    pantoprazole (PROTONIX) tablet 40 mg  40 mg Oral ACB    allopurinol (ZYLOPRIM) tablet 100 mg  100 mg Oral DAILY    arformoterol (BROVANA) neb solution 15 mcg  15 mcg Nebulization BID RT    And    budesonide (PULMICORT) 500 mcg/2 ml nebulizer suspension  500 mcg Nebulization BID RT    artificial saliva (MOUTH KOTE) 1 Spray  1 Spray Oral PRN    lactobac ac& pc-s.therm-b.anim (TIAN Q/RISAQUAD)  1 Cap Oral DAILY    oxyCODONE IR (ROXICODONE) tablet 5 mg  5 mg Oral Q6H PRN    tamsulosin (FLOMAX) capsule 0.4 mg  0.4 mg Oral DAILY    insulin lispro (HUMALOG) injection   SubCUTAneous AC&HS    Warfarin MD/NP dosing   Other PRN    sodium chloride (NS) flush 5-40 mL  5-40 mL IntraVENous Q8H    sodium chloride (NS) flush 5-40 mL  5-40 mL IntraVENous PRN    acetaminophen (TYLENOL) tablet 650 mg  650 mg Oral Q6H PRN    naloxone (NARCAN) injection 0.4 mg  0.4 mg IntraVENous PRN    ondansetron (ZOFRAN) injection 4 mg  4 mg IntraVENous Q4H PRN    bisacodyl (DULCOLAX) tablet 5 mg  5 mg Oral DAILY PRN    sucralfate (CARAFATE) tablet 1 g  1 g Oral AC&HS    influenza vaccine 2019-20 (6 mos+)(PF) (FLUARIX/FLULAVAL/FLUZONE QUAD) injection 0.5 mL  0.5 mL IntraMUSCular PRIOR TO DISCHARGE    hydrALAZINE (APRESOLINE) 20 mg/mL injection 20 mg  20 mg IntraVENous Q6H PRN    dextrose 10 % infusion 125-250 mL  125-250 mL IntraVENous PRN         Thank you for allowing me to participate in this patient's care.     Juan Vieira NP  [address] [place] [address], [facility]  Phone: [phone]  Fax: [phone]

## 2020-01-03 NOTE — PROGRESS NOTES
[facility]   [address] [facility], [address], [facility]  Phone: [phone]   [phone]       Nephrology Progress Note  Jimmy Javier     [birth year]     [id number]  Date of Admission : 10/25/2019  01/03/20    CC: Follow up for JUAN J, CKD, Hyponatremia      Assessment and Plan   JUAN J on CKD:  - resolving and stable  - Cr stable  - cont present care    GNR bacteremia:  - likely urinary source    Pseudomonal UTI:  - from cultures on 12/29  - abx per ID    Orthostatic hypotension w/ syncope:  - holding diuretics for now    Hyponatremia :  - Na stable at 130  - daily Na levels for now    Gross hematuria, BPH w/ retention:  - off CBI pre VAD. 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 CT in place    L arm clot s/p thrombectomy on 11/25    JOSE on CPAP      PAF s/p DCCV 6/19     Anemia:  - from blood loss s/p multiple blood products  - s/p IV iron and PAVEL     Interval History:    Seen and examined. Na and Cr stable. BP stable.   No cp or sob reported    Review of Systems: as per HPI    Current Medications:   Current Facility-Administered Medications   Medication Dose Route Frequency    warfarin (COUMADIN) tablet 2 mg  2 mg Oral ONCE    thiamine HCL (B-1) tablet 100 mg  100 mg Oral DAILY    venlafaxine-SR (EFFEXOR-XR) capsule 75 mg  75 mg Oral DAILY WITH BREAKFAST    midodrine (PROAMITINE) tablet 2.5 mg  2.5 mg Oral BID WITH MEALS    levoFLOXacin (LEVAQUIN) 750 mg in D5W IVPB  750 mg IntraVENous Q24H    cefepime (MAXIPIME) 2 g in 0.9% sodium chloride (MBP/ADV) 100 mL  2 g IntraVENous Q8H    sildenafil (pulm.hypertension) (REVATIO) tablet 10 mg  10 mg Oral TID    oxybutynin (DITROPAN) tablet 5 mg  5 mg Oral Q6H PRN    [Held by provider] bumetanide (BUMEX) injection 1 mg  1 mg IntraVENous BID    magnesium oxide (MAG-OX) tablet 800 mg  800 mg Oral BID    [Held by provider] milrinone (PRIMACOR) 20 MG/100 ML D5W infusion  0.125 mcg/kg/min IntraVENous CONTINUOUS    albumin human 5% (BUMINATE) solution 12.5 g  12.5 g IntraVENous DIALYSIS PRN    lidocaine (URO-JET/ GLYDO) 2 % jelly   Urethral PRN    senna-docusate (PERICOLACE) 8.6-50 mg per tablet 1 Tab  1 Tab Oral DAILY    epoetin yvonne-epbx (RETACRIT) injection 20,000 Units  20,000 Units SubCUTAneous Q MON, WED & FRI    levETIRAcetam (KEPPRA) tablet 125 mg  125 mg Oral BID    polyethylene glycol (MIRALAX) packet 17 g  17 g Oral DAILY    albuterol-ipratropium (DUO-NEB) 2.5 MG-0.5 MG/3 ML  3 mL Nebulization Q6H PRN    therapeutic multivitamin (THERAGRAN) tablet 1 Tab  1 Tab Oral DAILY    potassium chloride SR (KLOR-CON 10) tablet 40 mEq  40 mEq Oral DAILY    alteplase (CATHFLO) 1 mg in sterile water (preservative free) 1 mL injection  1 mg InterCATHeter PRN    finasteride (PROSCAR) tablet 5 mg  5 mg Oral DAILY    spironolactone (ALDACTONE) tablet 25 mg  25 mg Oral DAILY    clonazePAM (KlonoPIN) tablet 0.5 mg  0.5 mg Oral TID PRN    diphenhydrAMINE (BENADRYL) capsule 25 mg  25 mg Oral QHS PRN    octreotide (SANDOSTATIN) injection 25 mcg  25 mcg SubCUTAneous TID    benzocaine-menthol (CEPACOL) lozenge 1 Lozenge  1 Lozenge Mucous Membrane PRN    digoxin (LANOXIN) tablet 0.0625 mg  62.5 mcg Oral Q MON, WED & FRI    pantoprazole (PROTONIX) tablet 40 mg  40 mg Oral ACB    allopurinol (ZYLOPRIM) tablet 100 mg  100 mg Oral DAILY    arformoterol (BROVANA) neb solution 15 mcg  15 mcg Nebulization BID RT    And    budesonide (PULMICORT) 500 mcg/2 ml nebulizer suspension  500 mcg Nebulization BID RT    artificial saliva (MOUTH KOTE) 1 Spray  1 Spray Oral PRN    lactobac ac& pc-s.therm-b.anim (TIAN Q/RISAQUAD)  1 Cap Oral DAILY    oxyCODONE IR (ROXICODONE) tablet 5 mg  5 mg Oral Q6H PRN    tamsulosin (FLOMAX) capsule 0.4 mg  0.4 mg Oral DAILY    insulin lispro (HUMALOG) injection   SubCUTAneous AC&HS    Warfarin MD/NP dosing   Other PRN    sodium chloride (NS) flush 5-40 mL  5-40 mL IntraVENous Q8H    sodium chloride (NS) flush 5-40 mL  5-40 mL IntraVENous PRN    acetaminophen (TYLENOL) tablet 650 mg  650 mg Oral Q6H PRN    naloxone (NARCAN) injection 0.4 mg  0.4 mg IntraVENous PRN    ondansetron (ZOFRAN) injection 4 mg  4 mg IntraVENous Q4H PRN    bisacodyl (DULCOLAX) tablet 5 mg  5 mg Oral DAILY PRN    sucralfate (CARAFATE) tablet 1 g  1 g Oral AC&HS    influenza vaccine 2019-20 (6 mos+)(PF) (FLUARIX/FLULAVAL/FLUZONE QUAD) injection 0.5 mL  0.5 mL IntraMUSCular PRIOR TO DISCHARGE    hydrALAZINE (APRESOLINE) 20 mg/mL injection 20 mg  20 mg IntraVENous Q6H PRN    dextrose 10 % infusion 125-250 mL  125-250 mL IntraVENous PRN      No Known Allergies    Objective:  Vitals:    Vitals:    01/02/20 2341 01/03/20 0340 01/03/20 0753 01/03/20 1130   BP:       Pulse: 79 77 84 85   Resp: 16 18 16 18   Temp: 98 °F (36.7 °C) 98.2 °F (36.8 °C) 97.9 °F (36.6 °C) 98.1 °F (36.7 °C)   SpO2: 99% 99% 98% 98%   Weight:  76.4 kg (168 lb 6.9 oz)     Height:         Intake and Output:  01/03 0701 - 01/03 1900  In: 360 [P.O.:360]  Out: 600 [Urine:600]  01/01 1901 - 01/03 0700  In: 1088.9 [P.O.:810; I.V.:278.9]  Out: 0364 [Urine:3525]    Physical Examination:    General: Elderly man in no acute distress  Neck:  No lines   Resp:  TA  CV:  VAD sounds;   No LE edema  GI:  Soft and non-tender; no distension  Neurologic:  Alert and appropriate; normal speech  :  No Lizama    [x]    High complexity decision making was performed  [x]    Patient is at high-risk of decompensation with multiple organ involvement    Lab Data Personally Reviewed: I have reviewed all the pertinent labs, microbiology data and radiology studies during assessment. Recent Labs     01/03/20  0351 01/02/20  0436 01/01/20  0414   * 129* 132*   K 4.6 4.3 4.6   CL 99 96* 98   CO2 26 27 28   GLU 97 104* 96   BUN 19 21* 21*   CREA 1.02 1.15 1.35*   CA 8.6 8.9 8.7   MG 1.9 1.9 2.0   ALB 2.5* 2.6* 2.8*   SGOT 18 20 21   ALT 14 13 12   INR 1.4* 1.4* 2.1*     Recent Labs     01/03/20  0351 01/02/20  0436 01/01/20  1747 01/01/20  0414 12/31/19  1618   WBC 5.6 7.5  --  7.6  --    HGB 8.5* 8.2* 8.4* 8.2* 8.0*   HCT 28.0* 26.0* 26.9* 26.8* 25.8*   * 123*  --  133*  --      No results found for: SDES  Lab Results   Component Value Date/Time    Culture result: LIGHT NORMAL RESPIRATORY TIAN 12/31/2019 04:18 PM    Culture result: (A) 12/31/2019 03:24 PM     PSEUDOMONAS AERUGINOSA ISOLATED FROM 2 OF 4 BOTTLES DRAWN, EACH FROM A DIFFERENT SITE. .. RFA AND LEFT WRIST    Culture result: (A) 12/31/2019 03:24 PM     PRELIMINARY REPORT OF GRAM NEGATIVE RODS GROWING IN 1 OF 4 BOTTLES DRAWN CALLED TO AND READ BACK BY Elodia Fernandes RN AT 5151 ON 1.1.20 RB    Culture result: (A) 12/31/2019 03:24 PM     PRELIMINARY REPORT OF GRAM NEGATIVE RODS GROWING IN 2ND OF 4 BOTTLES DRAWN (DIFFERENT SITE=L WRIST) CALLED TO AND READ BACK BY Elodia Fernandes RN AT 15:51 ON 1/1/20 BY [facility].     Culture result: REMAINING BOTTLE(S) HAS/HAVE NO GROWTH SO FAR 12/31/2019 03:24 PM     Recent Results (from the past 24 hour(s))   GLUCOSE, POC    Collection Time: 01/02/20  4:49 PM   Result Value Ref Range    Glucose (POC) 99 65 - 100 mg/dL    Performed by Price PAULINO (CON)    GLUCOSE, POC    Collection Time: 01/02/20  9:35 PM   Result Value Ref Range    Glucose (POC) 112 (H) 65 - 100 mg/dL    Performed by Maude Todd, COMPREHENSIVE    Collection Time: 01/03/20  3:51 AM   Result Value Ref Range    Sodium 131 (L) 136 - 145 mmol/L    Potassium 4.6 3.5 - 5.1 mmol/L    Chloride 99 97 - 108 mmol/L    CO2 26 21 - 32 mmol/L    Anion gap 6 5 - 15 mmol/L    Glucose 97 65 - 100 mg/dL    BUN 19 6 - 20 MG/DL    Creatinine 1.02 0.70 - 1.30 MG/DL    BUN/Creatinine ratio 19 12 - 20      GFR est AA >60 >60 ml/min/1.73m2    GFR est non-AA >60 >60 ml/min/1.73m2    Calcium 8.6 8.5 - 10.1 MG/DL    Bilirubin, total 0.6 0.2 - 1.0 MG/DL    ALT (SGPT) 14 12 - 78 U/L    AST (SGOT) 18 15 - 37 U/L    Alk.  phosphatase 110 45 - 117 U/L    Protein, total 6.5 6.4 - 8.2 g/dL    Albumin 2.5 (L) 3.5 - 5.0 g/dL    Globulin 4.0 2.0 - 4.0 g/dL    A-G Ratio 0.6 (L) 1.1 - 2.2     MAGNESIUM    Collection Time: 01/03/20  3:51 AM   Result Value Ref Range    Magnesium 1.9 1.6 - 2.4 mg/dL   CBC W/O DIFF    Collection Time: 01/03/20  3:51 AM   Result Value Ref Range    WBC 5.6 4.1 - 11.1 K/uL    RBC 2.97 (L) 4.10 - 5.70 M/uL    HGB 8.5 (L) 12.1 - 17.0 g/dL    HCT 28.0 (L) 36.6 - 50.3 %    MCV 94.3 80.0 - 99.0 FL    MCH 28.6 26.0 - 34.0 PG    MCHC 30.4 30.0 - 36.5 g/dL    RDW 18.0 (H) 11.5 - 14.5 %    PLATELET 960 (L) 004 - 400 K/uL    MPV 9.6 8.9 - 12.9 FL    NRBC 0.0 0  WBC    ABSOLUTE NRBC 0.00 0.00 - 0.01 K/uL   PROTHROMBIN TIME + INR    Collection Time: 01/03/20  3:51 AM   Result Value Ref Range    INR 1.4 (H) 0.9 - 1.1      Prothrombin time 14.0 (H) 9.0 - 11.1 sec   PTT    Collection Time: 01/03/20  3:51 AM   Result Value Ref Range    aPTT 36.1 (H) 22.1 - 32.0 sec    aPTT, therapeutic range     58.0 - 77.0 SECS   NT-PRO BNP    Collection Time: 01/03/20  3:51 AM   Result Value Ref Range    NT pro-BNP 4,266 (H) <125 PG/ML   DIGOXIN    Collection Time: 01/03/20  3:51 AM   Result Value Ref Range    Digoxin level 0.6 (L) 0.90 - 2.00 NG/ML   LD    Collection Time: 01/03/20  3:51 AM   Result Value Ref Range     85 - 241 U/L   LACTIC ACID    Collection Time: 01/03/20  3:51 AM   Result Value Ref Range    Lactic acid 0.8 0.4 - 2.0 MMOL/L   PROCALCITONIN    Collection Time: 01/03/20  3:51 AM   Result Value Ref Range    Procalcitonin 1.85 ng/mL   TYPE & SCREEN    Collection Time: 01/03/20  3:51 AM   Result Value Ref Range    Crossmatch Expiration 01/06/2020     ABO/Rh(D) O POSITIVE     Antibody screen NEG     Comment       Previously identified nonspecific antibody and nonspecific cold antibody    ROYER Poly POS     ROYER IgG POS     ROYER C3b/C3d NEG     Unit number [id number]     Blood component type [facility]     Unit division 00     Status of unit ALLOCATED     Crossmatch result Compatible     Unit number [id number]     Blood component type [facility]     Unit division 00     Status of unit ALLOCATED     Crossmatch result Compatible    GLUCOSE, POC    Collection Time: 01/03/20  7:32 AM   Result Value Ref Range    Glucose (POC) 103 (H) 65 - 100 mg/dL    Performed by Douglas Valles MD

## 2020-01-03 NOTE — PROGRESS NOTES
ID Progress Note  1/3/2020    Subjective:     Doing ok, seems to be feeling stronger    Objective:     Antibiotics:  1. Levaquin  2. Rifampin   3. Fluconazole  4. Vancomycin    5. Cefazolin   6. Zosyn       Vitals:   Visit Vitals  BP 91/72 (BP 1 Location: Left arm, BP Patient Position: At rest)   Pulse 85   Temp 98.1 °F (36.7 °C)   Resp 18   Ht 6' 2\" (1.88 m)   Wt 76.4 kg (168 lb 6.9 oz)   SpO2 98%   BMI 21.63 kg/m²        Tmax:  Temp (24hrs), Av.1 °F (36.7 °C), Min:97.9 °F (36.6 °C), Max:98.2 °F (36.8 °C)      Exam:  Lungs:  clear to auscultation bilaterally  Heart:  regular rate and rhythm  Abdomen:  soft, non-tender. Bowel sounds normal. No masses,  no organomegaly      Labs:      Recent Labs     20  0351 20  0436 20  1747 20  0414 19  1618   WBC 5.6 7.5  --  7.6  --    HGB 8.5* 8.2* 8.4* 8.2* 8.0*   * 123*  --  133*  --    BUN 19 21*  --  21*  --    CREA 1.02 1.15  --  1.35*  --    SGOT 18 20  --  21  --     98  --  100  --    TBILI 0.6 0.8  --  1.0  --        Cultures:     No results found for: [facility]  Lab Results   Component Value Date/Time    Culture result: LIGHT NORMAL RESPIRATORY TIAN 2019 04:18 PM    Culture result: (A) 2019 03:24 PM     PSEUDOMONAS AERUGINOSA ISOLATED FROM 2 OF 4 BOTTLES DRAWN, EACH FROM A DIFFERENT SITE. .. RFA AND LEFT WRIST    Culture result: (A) 2019 03:24 PM     PRELIMINARY REPORT OF GRAM NEGATIVE RODS GROWING IN 1 OF 4 BOTTLES DRAWN CALLED TO AND READ BACK BY Argelia Jose RN AT 6240 ON 1.20 RB    Culture result: (A) 2019 03:24 PM     PRELIMINARY REPORT OF GRAM NEGATIVE RODS GROWING IN 2ND OF 4 BOTTLES DRAWN (DIFFERENT SITE=L WRIST) CALLED TO AND READ BACK BY Argelia Jose RN AT 15:51 ON 20 BY [facility]. Culture result: REMAINING BOTTLE(S) HAS/HAVE NO GROWTH SO FAR 2019 03:24 PM       Radiology:     Line/Insert Date:           Assessment:     1.  UTI--Serratia (2 biotypes) from culture and small amount of Enterococcus--now with Pseudomonas from culture of urine and blood  2. CHF/cardiomyopathy  3. ?aspiration event(s)  4. Renal insufficiency  5. Respiratory difficulties    Objective:     1.  Continue current therapy      Shea León MD

## 2020-01-03 NOTE — PROGRESS NOTES
Problem: Self Care Deficits Care Plan (Adult)  Goal: *Acute Goals and Plan of Care (Insert Text)  Description    FUNCTIONAL STATUS PRIOR TO ADMISSION: Patient was modified independent using a single point cane for functional mobility. Patient able to shower and dress himself. However, patient required assistance for household management from his wife. HOME SUPPORT: The patient lived alone with wife to provide assistance. Occupational Therapy Goals:    Goals reviewed and upgraded as follows 1/3/2020  1. Patient will perform upper body dressing moderate assistance within 7 day(s) including management of LVAD. -goal met 1/3/2020; upgrade to MIN A  2. Patient will perform lower body dressing with minimal assistance within 7 day(s). -goal met; upgrade to [id number] consistently  3. Patient will perform grooming seated EOB with supervision/setup within 7 day(s). -goal met; upgrade to standing with CGA   4. Patient will perform toilet transfers with minimum assistance within 7 day(s). -CONTINUE  5. Patient will perform all aspects of toileting with moderate assistance within 7 day(s). -CONTINUE    Goals reviewed and continued/modified as follows 12/26/2019  1. Patient will perform upper body dressing moderate assistance within 7 day(s) including management of LVAD. 2.  Patient will perform lower body dressing with minimal assistance within 7 day(s). (able to [place] socks in bed, needs MOD A for dynamic standing balance)  3. Patient will perform grooming seated EOB with supervision/setup within 7 day(s). 4.  Patient will perform toilet transfers with minimum assistance within 7 day(s). -CONTINUE  5. Patient will perform all aspects of toileting with moderate assistance within 7 day(s). -CONTINUE  6. Patient will participate in upper extremity therapeutic exercise/activities with supervision/set-up-min A for 5 minutes within 7 day(s). -GOAL MET (discontinue)  7.   Patient will utilize energy conservation techniques during functional activities with verbal cues within 7 day(s). 8. Patient will verbalize 3/5 LVAD components with min verbal cues within 7 day (s). -CONTINUE    Goals reviewed and continued 12/19/2019  OT weekly reassessment 12/6/19: goals modified  1. Patient will perform upper body dressing moderate assistance within 7 day(s). 2.  Patient will perform lower body dressing with moderate assistance within 7 day(s). 3.  Patient will perform grooming seated EOB with minimum assistance within 7 day(s). 4.  Patient will perform toilet transfers with minimum assistance within 7 day(s). 5.  Patient will perform all aspects of toileting with moderate assistance within 7 day(s). 6.  Patient will participate in upper extremity therapeutic exercise/activities with supervision/set-up-min A for 5 minutes within 7 day(s). 7.  Patient will utilize energy conservation techniques during functional activities with verbal cues within 7 day(s). 8. Patient will verbalize 3/5 LVAD components with min verbal cues within 7 day (s). OT weekly reassessment 11/29/19: goals modified below  1. Patient will perform upper body dressing including LVAD switchovers with moderate assistance within 7 day(s). 2.  Patient will perform lower body dressing with minimal assistance within 7 day(s). 3.  Patient will perform grooming in standing with minimum assistance within 7 day(s). 4.  Patient will perform toilet transfers with minimum assistance within 7 day(s). 5.  Patient will perform all aspects of toileting with minimal assistance within 7 day(s). 6.  Patient will participate in upper extremity therapeutic exercise/activities with supervision/set-up for 5 minutes within 7 day(s). 7.  Patient will utilize energy conservation techniques during functional activities with verbal cues within 7 day(s). 8. Patient will verbalize LVAD terminology with verbal cues within 7 day (s).      Goals reviewed and continued/modified as follows 11/20/19 s/p LVAD implantation  1. Patient will perform upper body dressing including LVAD switchovers with moderate assistance within 7 day(s). 2.  Patient will perform lower body dressing with minimal assistance within 7 day(s). 3.  Patient will perform grooming with supervision/setup within 7 day(s). 4.  Patient will perform toilet transfers supervision/setupmodified independence within 7 day(s). 5.  Patient will perform all aspects of toileting with minimal assistance within 7 day(s). 6.  Patient will participate in upper extremity therapeutic exercise/activities with supervision/set-up for 5 minutes within 7 day(s). 7.  Patient will utilize energy conservation techniques during functional activities with verbal cues within 7 day(s). 8. Patient will verbalize LVAD terminology with verbal cues within 7 day (s). Goals reviewed and continued/modified as follows 11/12/19  1. Patient will perform bathing with supervision/set-up within 7 day(s). 2.  Patient will perform lower body dressing with supervision/set-up within 7 day(s). 3.  Patient will perform grooming with modified independence within 7 day(s). 4.  Patient will perform toilet transfers with modified independence within 7 day(s). 5.  Patient will perform all aspects of toileting with supervision/set-up within 7 day(s). 6.  Patient will participate in upper extremity therapeutic exercise/activities with supervision/set-up for 5 minutes within 7 day(s). 7.  Patient will utilize energy conservation techniques during functional activities with verbal cues within 7 day(s). 8. Patient will verbalize LVAD terminology with verbal cues within 7 day (s) in preparation for implant. Continue all goals 10/30/19 post re-eval for Impella removal   Initiated 10/28/2019  1. Patient will perform bathing with supervision/set-up within 7 day(s). 2.  Patient will perform lower body dressing with supervision/set-up within 7 day(s).   3. Patient will perform grooming with modified independence within 7 day(s). 4.  Patient will perform toilet transfers with modified independence within 7 day(s). 5.  Patient will perform all aspects of toileting with supervision/set-up within 7 day(s). 6.  Patient will participate in upper extremity therapeutic exercise/activities with supervision/set-up for 5 minutes within 7 day(s). 7.  Patient will utilize energy conservation techniques during functional activities with verbal cues within 7 day(s). Outcome: Progressing Towards Goal   OCCUPATIONAL THERAPY TREATMENT/WEEKLY RE-EVALUATION  Patient: Martha Gallagher (75 y.o. male)  Date: 1/3/2020  Diagnosis: [facility] [I50.9]   <principal problem not specified>  Procedure(s) (LRB):  LEFT BRACHIAL THROMBECTOMY (Left) 39 Days Post-Op  Precautions: Fall, Sternal(LVAD )  Chart, occupational therapy assessment, plan of care, and goals were reviewed. ASSESSMENT  Based on the objective data described below, patient presents with generalized weakness, impaired balance, decreased activity tolerance, and decreased insight into deficits however with noted improved BP today with some initial c/o dizziness in standing, however resolved. Patient completed LVAD switchover with MOD A recalling 3/5 components and with only minimal assistance for management of power leads (placed contrasting color stickers on LVAD leads due to patient with visual perceptual deficits (figure ground). Patient then completed 3 sit <> stand trials tolerating BUE cardiac exercises during each standing trial (~2 minutes each prior to requiring rest break). Patient then completed switchover from batteries > PM with MOD A. Noted patient with stable MAPs with activity (80), however patient with slight nosebleed toward end of OT session. Continue to recommend IPR post discharge to maximize patient safety and independence with ADL transfers and tasks. Current Level of Function Impacting Discharge (ADLs): MOD A sit <> stand transfers (bedside commode); MOD A LVAD management    Other factors to consider for discharge: LVAD HM III         PLAN :  Goals have been updated based on progression since last assessment. Patient continues to benefit from skilled intervention to address the above impairments. Continue to follow patient 6 times a week to address goals. Recommend with staff: OOB x 3 to chair; LVAD education (2 switchovers a day this weekend-Saturday/Sunday)    Recommend next OT session: standing ADL    Recommendation for discharge: (in order for the patient to meet his/her long term goals)  Therapy 3 hours per day 5-7 days per week    This discharge recommendation:  Has been made in collaboration with the attending provider and/or case management    Equipment recommendations for successful discharge (if) home: TBD       SUBJECTIVE:   Patient stated I am feeling really tired.     OBJECTIVE DATA SUMMARY:   Cognitive/Behavioral Status:  Neurologic State: Alert  Orientation Level: Oriented X4  Cognition: Appropriate for age attention/concentration  Perception: Appears intact  Perseveration: No perseveration noted  Safety/Judgement: Decreased insight into deficits; Decreased awareness of need for safety;Decreased awareness of need for assistance    Functional Mobility and Transfers for ADLs:    Transfers:  Sit to Stand: Moderate assistance     Bed to Chair: Minimum assistance;Assist x2    Balance:  Sitting: Impaired; Without support  Sitting - Static: Good (unsupported)  Sitting - Dynamic: Fair (occasional)  Standing: Impaired; With support  Standing - Static: Fair  Standing - Dynamic : Poor    ADL Intervention:     Patient demonstrated switchover from power module to battery in prep for ADL routine with MOD A.    Demonstrated the following tasks:    Independent Verbal cues Physical assistance Comments   Check PM & SC  x     Ensure  clipped onto stabilization belt  x     Remove front (green lighted) batteries from , place back batteries into front slots   x    Check batteries  x     Position batteries into battery clips  x     Don holster vest   x    Disconnect power leads   x    Insert power lead into battery clip  x     [place] batteries in holster   x    Secure batteries with Velcro and clips   x          Patient demonstrated switchover from battery to PM in prep for ADL routine with MOD A. Demonstrated the following tasks:    Independent Verbal cues Physical assistance Comments   Remove batteries from holster   x     Disconnect power leads from battery    x    Reconnect power leads to PM   x     Remove batteries from clips  x     Place batteries into battery charger   x    Doff holster vest       x                 Upper Body Dressing Assistance  Dressing Assistance: Moderate assistance(for LVAD management)              Cognitive Retraining  Safety/Judgement: Decreased insight into deficits; Decreased awareness of need for safety;Decreased awareness of need for assistance      Activity Tolerance:   Fair, requires rest breaks, and dizziness with positional changes (resolved with time)   Please refer to the flowsheet for vital signs taken during this treatment.     After treatment patient left in no apparent distress:   Sitting in chair and Call bell within reach    COMMUNICATION/COLLABORATION:   The patients plan of care was discussed with: Physical Therapist and Registered Nurse    Kasi Escobar  Time Calculation: 46 mins

## 2020-01-04 NOTE — PROGRESS NOTES
[facility] VAD/Heart Failure Progress Note    Admit Date: 10/25/2019  POD:  40 Days Post-Op    Procedure:  Procedure(s):  LEFT BRACHIAL THROMBECTOMY        Subjective:   Dyspnea, fatigue, and weakness; working on nutrition and depression      Objective:   Vitals:  Blood pressure 91/72, pulse 80, temperature 97.7 °F (36.5 °C), resp. rate 18, height 6' 2\" (1.88 m), weight 167 lb 8.8 oz (76 kg), SpO2 97 %.   Temp (24hrs), Av.2 °F (36.8 °C), Min:97.7 °F (36.5 °C), Max:98.5 °F (36.9 °C)    Hemodynamics:   CO: CO (l/min): 5.8 l/min   CI: CI (l/min/m2): 2.8 l/min/m2   CVP: CVP (mmHg): 4 mmHg (19 1645)   SVR: SVR (dyne*sec)/cm5: 1080 (dyne*sec)/cm5 (19 8748)   PAP Systolic: PAP Systolic: 34 (49/48/03 0630)   PAP Diastolic: PAP Diastolic: 13 (58// 3300)   PVR:     SV02: SVO2 (%): 67 % (19 1300)   SCV02: SCVO2 (%): 75 % (10/29/19 1900)    VAD Interrogation: LVAD (Heartmate)  Pump Speed (RPM): 6400  Pump Flow (LPM): 5.9  PI (Pulsitility Index): 3.2  Power: 5.6  MAP: 96   Test: Yes  Back Up  at Bedside & Labeled: Yes  Power Module Test: Yes  Driveline Site Care: Yes  Driveline Dressing: Clean, Dry, and Intact    EKG/Rhythm:      Extubation Date / Time:     CT Output:     Ventilator:  Ventilator Volumes  Vt Set (ml): 550 ml (19 08)  Vt Exhaled (Machine Breath) (ml): 639 ml (19 08)  Vt Spont (ml): 437 ml (19 1035)  Ve Observed (l/min): 7.25 l/min (19 1035)    Oxygen Therapy:  Oxygen Therapy  O2 Sat (%): 97 % (20 1013)  Pulse via Oximetry: 104 beats per minute (20 1013)  O2 Device: Nasal cannula (20)  PEEP/CPAP (cm H2O): 1 cm H20 (20 1013)  O2 Flow Rate (L/min): 1 l/min (20 0800)  FIO2 (%): 21 % (19 08)    CXR:    Admission Weight: Last Weight   Weight: 192 lb 10.9 oz (87.4 kg) Weight: 167 lb 8.8 oz (76 kg)     Intake / Output / Drain:  Current Shift:  0701 -  1900  In: 5463 [P.O.:240; I.V.:801]  Out: 1750 [Urine:1750]  Last 24 hrs.:     Intake/Output Summary (Last 24 hours) at 1/4/2020 1041  Last data filed at 1/4/2020 0859  Gross per 24 hour   Intake 1778.03 ml   Output 2800 ml   Net -1021.97 ml             No results for input(s): CPK, CKMB, TROIQ in the last 72 hours.   Recent Labs     01/04/20  0617 01/03/20  0351 01/02/20  0436   * 131* 129*   K 4.8 4.6 4.3   CO2 25 26 27   BUN 20 19 21*   CREA 1.03 1.02 1.15   * 97 104*   MG 1.8 1.9 1.9   WBC 6.1 5.6 7.5   HGB 8.8* 8.5* 8.2*   HCT 28.5* 28.0* 26.0*   * 124* 123*     Recent Labs     01/04/20  0617 01/03/20  0351 01/02/20  0436   INR 1.4* 1.4* 1.4*   PTP 13.5* 14.0* 14.0*   APTT 32.5* 36.1* 38.2*     No lab exists for component: PBNP        Current Facility-Administered Medications:     warfarin (COUMADIN) tablet 3 mg, 3 mg, Oral, ONCE, Mounika Altman MD    thiamine HCL (B-1) tablet 100 mg, 100 mg, Oral, DAILY, Cuco ACUNA NP, 100 mg at 01/04/20 0900    venlafaxine-SR (EFFEXOR-XR) capsule 75 mg, 75 mg, Oral, DAILY WITH BREAKFAST, Cuco Vásquez NP, 75 mg at 01/04/20 0859    midodrine (PROAMITINE) tablet 2.5 mg, 2.5 mg, Oral, BID WITH MEALS, Cuco ACUNA NP, 2.5 mg at 01/04/20 0925    levoFLOXacin (LEVAQUIN) 750 mg in D5W IVPB, 750 mg, IntraVENous, Q24H, Chas Simmons MD, Last Rate: 100 mL/hr at 01/03/20 1608, 750 mg at 01/03/20 1608    cefepime (MAXIPIME) 2 g in 0.9% sodium chloride (MBP/ADV) 100 mL, 2 g, IntraVENous, Q8H, Moe Olivares MD, Last Rate: 200 mL/hr at 01/04/20 0859, 2 g at 01/04/20 0859    sildenafil (pulm.hypertension) (REVATIO) tablet 10 mg, 10 mg, Oral, TID, Cuco ACUNA NP, 10 mg at 01/04/20 0859    oxybutynin (DITROPAN) tablet 5 mg, 5 mg, Oral, Q6H PRN, Shana Sims NP, 5 mg at 01/01/20 1204    [Held by provider] bumetanide (BUMEX) injection 1 mg, 1 mg, IntraVENous, BID, Cuco ACUNA NP, 1 mg at 12/30/19 9203    magnesium oxide (MAG-OX) tablet 800 mg, 800 mg, Oral, BID, Isidorocintia Cortezóscar E, NP, 800 mg at 01/04/20 0859    [Held by provider] milrinone (PRIMACOR) 20 MG/100 ML D5W infusion, 0.125 mcg/kg/min, IntraVENous, CONTINUOUS, Shana Sims NP, Last Rate: 2.8 mL/hr at 01/02/20 0121, 0.125 mcg/kg/min at 01/02/20 0121    albumin human 5% (BUMINATE) solution 12.5 g, 12.5 g, IntraVENous, DIALYSIS PRN, Logan Kincaid MD    lidocaine (URO-JET/ GLYDO) 2 % jelly, , Urethral, PRN, Mounika Altman MD    senna-docusate (PERICOLACE) 8.6-50 mg per tablet 1 Tab, 1 Tab, Oral, DAILY, Sarah Herrera NP, Stopped at 01/04/20 0900    epoetin yvonne-epbx (RETACRIT) injection 20,000 Units, 20,000 Units, SubCUTAneous, Q MON, WED & FRI, Logan Kincaid MD, 20,000 Units at 01/03/20 2101    levETIRAcetam (KEPPRA) tablet 125 mg, 125 mg, Oral, BID, Cheyenne Herrera NP, 125 mg at 01/04/20 0859    polyethylene glycol (MIRALAX) packet 17 g, 17 g, Oral, DAILY, Sarah Herrera NP, Stopped at 01/04/20 0900    albuterol-ipratropium (DUO-NEB) 2.5 MG-0.5 MG/3 ML, 3 mL, Nebulization, Q6H PRN, Christopher Walden MD, 3 mL at 12/25/19 1407    therapeutic multivitamin (THERAGRAN) tablet 1 Tab, 1 Tab, Oral, DAILY, Levi, Shana B, NP, 1 Tab at 01/04/20 0859    potassium chloride SR (KLOR-CON 10) tablet 40 mEq, 40 mEq, Oral, DAILY, Levi, Shana B, NP, 40 mEq at 01/04/20 0925    alteplase (CATHFLO) 1 mg in sterile water (preservative free) 1 mL injection, 1 mg, InterCATHeter, PRN, Mounika Altman MD, 1 mg at 12/31/19 1121    finasteride (PROSCAR) tablet 5 mg, 5 mg, Oral, DAILY, Mohan Rayo MD, 5 mg at 01/04/20 0859    spironolactone (ALDACTONE) tablet 25 mg, 25 mg, Oral, DAILY, Mounika Altman MD, 25 mg at 01/04/20 0859    clonazePAM (KlonoPIN) tablet 0.5 mg, 0.5 mg, Oral, TID PRN, [place] Mounika Ornelas MD, 0.5 mg at 01/01/20 3411    diphenhydrAMINE (BENADRYL) capsule 25 mg, 25 mg, Oral, QHS PRN, Sarah Herrera, NP, 25 mg at 01/04/20 0017    octreotide (SANDOSTATIN) injection 25 mcg, 25 mcg, SubCUTAneous, TID, Polliard, Erna LOPEZ NP, 25 mcg at 01/04/20 0900    benzocaine-menthol (CEPACOL) lozenge 1 Lozenge, 1 Lozenge, Mucous Membrane, PRN, Pollliborio, Alix Knight, NP    digoxin (LANOXIN) tablet 0.0625 mg, 62.5 mcg, Oral, Q MON, WED & FRI, Pollliborio, Alix Knight NP, 0.0625 mg at 01/03/20 2100    pantoprazole (PROTONIX) tablet 40 mg, 40 mg, Oral, ACB, Polliard, Erna LOPEZ NP, 40 mg at 01/04/20 0651    allopurinol (ZYLOPRIM) tablet 100 mg, 100 mg, Oral, DAILY, Polliard, Erna LOPEZ NP, 100 mg at 01/04/20 0859    arformoterol (BROVANA) neb solution 15 mcg, 15 mcg, Nebulization, BID RT, 15 mcg at 01/04/20 1012 **AND** budesonide (PULMICORT) 500 mcg/2 ml nebulizer suspension, 500 mcg, Nebulization, BID RT, Rin Herrera NP, 500 mcg at 01/04/20 1012    artificial saliva (MOUTH KOTE) 1 Spray, 1 Spray, Oral, PRN, Pollliborio, Alix Knight NP, 1 Spray at 12/12/19 1452    lactobac ac& pc-s.therm-b.anim (TIAN Q/RISAQUAD), 1 Cap, Oral, DAILY, Aaron Lugo MD, 1 Cap at 01/04/20 0900    oxyCODONE IR (ROXICODONE) tablet 5 mg, 5 mg, Oral, Q6H PRN, Jami Orta MD, 5 mg at 12/30/19 0405    tamsulosin (FLOMAX) capsule 0.4 mg, 0.4 mg, Oral, DAILY, Kallie Gauthier MD, 0.4 mg at 01/04/20 0859    insulin lispro (HUMALOG) injection, , SubCUTAneous, AC&HS, Shana Sims NP, 2 Units at 01/04/20 0714    Warfarin MD/NP dosing, , Other, PRN, Mounika Altman MD    sodium chloride (NS) flush 5-40 mL, 5-40 mL, IntraVENous, Q8H, Harshal Barrios MD, 10 mL at 01/04/20 0651    sodium chloride (NS) flush 5-40 mL, 5-40 mL, IntraVENous, PRN, Jami Orta MD, 10 mL at 01/02/20 2342    acetaminophen (TYLENOL) tablet 650 mg, 650 mg, Oral, Q6H PRN, Jami Orta MD, 650 mg at 01/02/20 2213    naloxone (NARCAN) injection 0.4 mg, 0.4 mg, IntraVENous, PRN, Jami Orta MD    ondansetron [facility]) injection 4 mg, 4 mg, IntraVENous, Q4H PRN, Nick Sousa MD, 4 mg at 01/03/20 1748    bisacodyl (DULCOLAX) tablet 5 mg, 5 mg, Oral, DAILY PRN, Nick Sousa MD, 5 mg at 12/22/19 1533    sucralfate (CARAFATE) tablet 1 g, 1 g, Oral, AC&HS, Nick Sousa MD, 1 g at 01/04/20 0651    influenza vaccine 2019-20 (6 mos+)(PF) (FLUARIX/FLULAVAL/FLUZONE QUAD) injection 0.5 mL, 0.5 mL, IntraMUSCular, PRIOR TO DISCHARGE, Tiara Altman MD    hydrALAZINE (APRESOLINE) 20 mg/mL injection 20 mg, 20 mg, IntraVENous, Q6H PRN, Shana Sims NP, 20 mg at 12/10/19 0541    dextrose 10 % infusion 125-250 mL, 125-250 mL, IntraVENous, PRN, Mounika Lomas MD, Stopped at 11/18/19 0700    A/P     S/P LVAD - good flows  Need for anti-coagulation - coumadin  DM - insulin  RV dysfunction - milrinone, diuretics      Risk of morbidity and mortality - high  Medical decision making - high complexity  Signed By: Nolvia Navarrete MD

## 2020-01-04 NOTE — PROGRESS NOTES
[facility] VAD/Heart Failure Progress Note    Admit Date: 10/25/2019  POD:  40 Days Post-Op    Procedure:  Procedure(s):  LEFT BRACHIAL THROMBECTOMY        Subjective:   Dyspnea, fatigue, and weakness; denies chest pain      Objective:   Vitals:  Blood pressure 91/72, pulse 80, temperature 97.7 °F (36.5 °C), resp. rate 18, height 6' 2\" (1.88 m), weight 167 lb 8.8 oz (76 kg), SpO2 97 %.   Temp (24hrs), Av.2 °F (36.8 °C), Min:97.7 °F (36.5 °C), Max:98.5 °F (36.9 °C)    Hemodynamics:   CO: CO (l/min): 5.8 l/min   CI: CI (l/min/m2): 2.8 l/min/m2   CVP: CVP (mmHg): 4 mmHg (19 1645)   SVR: SVR (dyne*sec)/cm5: 1080 (dyne*sec)/cm5 (19 6751)   PAP Systolic: PAP Systolic: 34 (45/54/73 7507)   PAP Diastolic: PAP Diastolic: 13 ( 8134)   PVR:     SV02: SVO2 (%): 67 % (19 1300)   SCV02: SCVO2 (%): 75 % (10/29/19 1900)    VAD Interrogation: LVAD (Heartmate)  Pump Speed (RPM): 6400  Pump Flow (LPM): 5.9  PI (Pulsitility Index): 3.2  Power: 5.6  MAP: 96   Test: Yes  Back Up  at Bedside & Labeled: Yes  Power Module Test: Yes  Driveline Site Care: Yes  Driveline Dressing: Clean, Dry, and Intact    EKG/Rhythm:      Extubation Date / Time:     CT Output:     Ventilator:  Ventilator Volumes  Vt Set (ml): 550 ml (19 08)  Vt Exhaled (Machine Breath) (ml): 639 ml (19 08)  Vt Spont (ml): 437 ml (19 1035)  Ve Observed (l/min): 7.25 l/min (19 1035)    Oxygen Therapy:  Oxygen Therapy  O2 Sat (%): 97 % (20 1013)  Pulse via Oximetry: 104 beats per minute (20 1013)  O2 Device: Nasal cannula (20)  PEEP/CPAP (cm H2O): 1 cm H20 (20 1013)  O2 Flow Rate (L/min): 1 l/min (20 0800)  FIO2 (%): 21 % (19 08)    CXR:    Admission Weight: Last Weight   Weight: 192 lb 10.9 oz (87.4 kg) Weight: 167 lb 8.8 oz (76 kg)     Intake / Output / Drain:  Current Shift:  07 -  1900  In: 2182 [P.O.:240; I.V.:801]  Out: 1750 [Urine:1750]  Last 24 hrs.:     Intake/Output Summary (Last 24 hours) at 1/4/2020 1038  Last data filed at 1/4/2020 0859  Gross per 24 hour   Intake 1778.03 ml   Output 2800 ml   Net -1021.97 ml             No results for input(s): CPK, CKMB, TROIQ in the last 72 hours.   Recent Labs     01/04/20  0617 01/03/20  0351 01/02/20  0436   * 131* 129*   K 4.8 4.6 4.3   CO2 25 26 27   BUN 20 19 21*   CREA 1.03 1.02 1.15   * 97 104*   MG 1.8 1.9 1.9   WBC 6.1 5.6 7.5   HGB 8.8* 8.5* 8.2*   HCT 28.5* 28.0* 26.0*   * 124* 123*     Recent Labs     01/04/20  0617 01/03/20  0351 01/02/20  0436   INR 1.4* 1.4* 1.4*   PTP 13.5* 14.0* 14.0*   APTT 32.5* 36.1* 38.2*     No lab exists for component: PBNP        Current Facility-Administered Medications:     warfarin (COUMADIN) tablet 3 mg, 3 mg, Oral, ONCE, Mounika Altman MD    thiamine HCL (B-1) tablet 100 mg, 100 mg, Oral, DAILY, Elisha Hernadez E, NP, 100 mg at 01/04/20 0900    venlafaxine-SR (EFFEXOR-XR) capsule 75 mg, 75 mg, Oral, DAILY WITH BREAKFAST, Elisha Hernadez, NP, 75 mg at 01/04/20 0859    midodrine (PROAMITINE) tablet 2.5 mg, 2.5 mg, Oral, BID WITH MEALS, Elisha ACUNA, NP, 2.5 mg at 01/04/20 0925    levoFLOXacin (LEVAQUIN) 750 mg in D5W IVPB, 750 mg, IntraVENous, Q24H, Constance Dancer, MD, Last Rate: 100 mL/hr at 01/03/20 1608, 750 mg at 01/03/20 1608    cefepime (MAXIPIME) 2 g in 0.9% sodium chloride (MBP/ADV) 100 mL, 2 g, IntraVENous, Q8H, Dino Olivares MD, Last Rate: 200 mL/hr at 01/04/20 0859, 2 g at 01/04/20 0859    sildenafil (pulm.hypertension) (REVATIO) tablet 10 mg, 10 mg, Oral, TID, Canda Bogus E, NP, 10 mg at 01/04/20 0859    oxybutynin (DITROPAN) tablet 5 mg, 5 mg, Oral, Q6H PRN, Shana Sims, NP, 5 mg at 01/01/20 1204    [Held by provider] bumetanide (BUMEX) injection 1 mg, 1 mg, IntraVENous, BID, Canda Bogus E, NP, 1 mg at 12/30/19 0364    magnesium oxide (MAG-OX) tablet 800 mg, 800 mg, Oral, BID, Skipper Lizabeth E, NP, 800 mg at 01/04/20 0859    [Held by provider] milrinone (PRIMACOR) 20 MG/100 ML D5W infusion, 0.125 mcg/kg/min, IntraVENous, CONTINUOUS, Levi Shana B NP, Last Rate: 2.8 mL/hr at 01/02/20 0121, 0.125 mcg/kg/min at 01/02/20 0121    albumin human 5% (BUMINATE) solution 12.5 g, 12.5 g, IntraVENous, DIALYSIS PRN, Logan Kincaid MD    lidocaine (URO-JET/ GLYDO) 2 % jelly, , Urethral, PRN, Mounika Altman MD    senna-docusate (PERICOLACE) 8.6-50 mg per tablet 1 Tab, 1 Tab, Oral, DAILY, Polliard, Anton Tran, NP, Stopped at 01/04/20 0900    epoetin yvonne-epbx (RETACRIT) injection 20,000 Units, 20,000 Units, SubCUTAneous, Q MON, WED & FRI, Logan Kincaid MD, 20,000 Units at 01/03/20 2101    levETIRAcetam (KEPPRA) tablet 125 mg, 125 mg, Oral, BID, Polliard, Katrina LOPEZ, NP, 125 mg at 01/04/20 0859    polyethylene glycol (MIRALAX) packet 17 g, 17 g, Oral, DAILY, PolliardAnton, NP, Stopped at 01/04/20 0900    albuterol-ipratropium (DUO-NEB) 2.5 MG-0.5 MG/3 ML, 3 mL, Nebulization, Q6H PRN, Macrina Guerra MD, 3 mL at 12/25/19 1407    therapeutic multivitamin (THERAGRAN) tablet 1 Tab, 1 Tab, Oral, DAILY, Levi, Shana B, NP, 1 Tab at 01/04/20 0859    potassium chloride SR (KLOR-CON 10) tablet 40 mEq, 40 mEq, Oral, DAILY, Levi, Shana B, NP, 40 mEq at 01/04/20 0925    alteplase (CATHFLO) 1 mg in sterile water (preservative free) 1 mL injection, 1 mg, InterCATHeter, PRN, Mounika Altman MD, 1 mg at 12/31/19 1121    finasteride (PROSCAR) tablet 5 mg, 5 mg, Oral, DAILY, Juarez Del Valle MD, 5 mg at 01/04/20 0859    spironolactone (ALDACTONE) tablet 25 mg, 25 mg, Oral, DAILY, Mounika Altman MD, 25 mg at 01/04/20 0859    clonazePAM (KlonoPIN) tablet 0.5 mg, 0.5 mg, Oral, TID PRN, Mounika Lomas MD, 0.5 mg at 01/01/20 1865    diphenhydrAMINE (BENADRYL) capsule 25 mg, 25 mg, Oral, QHS PRN, Anton Herrera NP, 25 mg at 01/04/20 0017    octreotide (SANDOSTATIN) injection 25 mcg, 25 mcg, SubCUTAneous, TID, Polliard, Chioma T, NP, 25 mcg at 01/04/20 0900    benzocaine-menthol (CEPACOL) lozenge 1 Lozenge, 1 Lozenge, Mucous Membrane, PRN, Polliard, Serrano Laughter, NP    digoxin (LANOXIN) tablet 0.0625 mg, 62.5 mcg, Oral, Q MON, WED & FRI, Polliard, Serrano Laughter, NP, 0.0625 mg at 01/03/20 2100    pantoprazole (PROTONIX) tablet 40 mg, 40 mg, Oral, ACB, Polliard, Garden T, NP, 40 mg at 01/04/20 0651    allopurinol (ZYLOPRIM) tablet 100 mg, 100 mg, Oral, DAILY, Polliard, Garden T, NP, 100 mg at 01/04/20 0859    arformoterol (BROVANA) neb solution 15 mcg, 15 mcg, Nebulization, BID RT, 15 mcg at 01/04/20 1012 **AND** budesonide (PULMICORT) 500 mcg/2 ml nebulizer suspension, 500 mcg, Nebulization, BID RT, PollRin capone T, NP, 500 mcg at 01/04/20 1012    artificial saliva (MOUTH KOTE) 1 Spray, 1 Spray, Oral, PRN, Polliard, Serrano Laughter, NP, 1 Spray at 12/12/19 1452    lactobac ac& pc-s.therm-b.anim (TIAN Q/RISAQUAD), 1 Cap, Oral, DAILY, Ector Hernandez MD, 1 Cap at 01/04/20 0900    oxyCODONE IR (ROXICODONE) tablet 5 mg, 5 mg, Oral, Q6H PRN, Raul Norris MD, 5 mg at 12/30/19 0405    tamsulosin (FLOMAX) capsule 0.4 mg, 0.4 mg, Oral, DAILY, Manpreet Mcleod MD, 0.4 mg at 01/04/20 0859    insulin lispro (HUMALOG) injection, , SubCUTAneous, AC&HS, Shana Sims B, NP, 2 Units at 01/04/20 0714    Warfarin MD/NP dosing, , Other, PRN, Mounika Altman MD    sodium chloride (NS) flush 5-40 mL, 5-40 mL, IntraVENous, Q8H, Harshal Domínguez MD, 10 mL at 01/04/20 0651    sodium chloride (NS) flush 5-40 mL, 5-40 mL, IntraVENous, PRN, Raul Norris MD, 10 mL at 01/02/20 2342    acetaminophen (TYLENOL) tablet 650 mg, 650 mg, Oral, Q6H PRN, Raul Norris MD, 650 mg at 01/02/20 2213    naloxone (NARCAN) injection 0.4 mg, 0.4 mg, IntraVENous, PRN, Raul Norris MD    ondansetron [facility]) injection 4 mg, 4 mg, IntraVENous, Q4H PRN, Rufino Ward MD, 4 mg at 01/03/20 1748    bisacodyl (DULCOLAX) tablet 5 mg, 5 mg, Oral, DAILY PRN, Rufino Ward MD, 5 mg at 12/22/19 1533    sucralfate (CARAFATE) tablet 1 g, 1 g, Oral, AC&HS, Rufino Ward MD, 1 g at 01/04/20 0651    influenza vaccine 2019-20 (6 mos+)(PF) (FLUARIX/FLULAVAL/FLUZONE QUAD) injection 0.5 mL, 0.5 mL, IntraMUSCular, PRIOR TO DISCHARGE, Olegario Altman MD    hydrALAZINE (APRESOLINE) 20 mg/mL injection 20 mg, 20 mg, IntraVENous, Q6H PRN, Shana Sims NP, 20 mg at 12/10/19 0541    dextrose 10 % infusion 125-250 mL, 125-250 mL, IntraVENous, PRN, Mounika Serra MD, Stopped at 11/18/19 0700    A/P     S/P LVAD - good flows  Need for anti-coagulation - coumadin  DM - insulin  RV dysfunction - milrinone, diuretics      Risk of morbidity and mortality - high  Medical decision making - high complexity    Signed By: Dary Mckeon MD

## 2020-01-04 NOTE — PROGRESS NOTES
ID Progress Note  2020    Subjective:     Doing ok, seems to be feeling stronger    Objective:     Antibiotics:  1. Levaquin  2. Cefepime   3. Rifampin   4. Fluconazole  5. Vancomycin    6. Cefazolin   7. Zosyn       Vitals:   Visit Vitals  BP 91/72 (BP 1 Location: Left arm, BP Patient Position: At rest)   Pulse 82   Temp 98 °F (36.7 °C)   Resp 18   Ht 6' 2\" (1.88 m)   Wt 76 kg (167 lb 8.8 oz)   SpO2 96%   BMI 21.51 kg/m²        Tmax:  Temp (24hrs), Av.1 °F (36.7 °C), Min:97.7 °F (36.5 °C), Max:98.5 °F (36.9 °C)      Exam:  Lungs:  clear to auscultation bilaterally  Heart:  regular rate and rhythm  Abdomen:  soft, non-tender. Bowel sounds normal. No masses,  no organomegaly      Labs:      Recent Labs     20  0617 20  0351 20  0436 20  1747   WBC 6.1 5.6 7.5  --    HGB 8.8* 8.5* 8.2* 8.4*   * 124* 123*  --    BUN 20 19 21*  --    CREA 1.03 1.02 1.15  --    SGOT 21 18 20  --     110 98  --    TBILI 0.6 0.6 0.8  --        Cultures:     No results found for: SDES  Lab Results   Component Value Date/Time    Culture result: LIGHT NORMAL RESPIRATORY TIAN 2019 04:18 PM    Culture result: (A) 2019 03:24 PM     PSEUDOMONAS AERUGINOSA ISOLATED FROM 2 OF 4 BOTTLES DRAWN, EACH FROM A DIFFERENT SITE. .. RFA AND LEFT WRIST    Culture result: (A) 2019 03:24 PM     PRELIMINARY REPORT OF GRAM NEGATIVE RODS GROWING IN 1 OF 4 BOTTLES DRAWN CALLED TO AND READ BACK BY Clara Rascon RN AT 1220 ON 20 RB    Culture result: (A) 2019 03:24 PM     PRELIMINARY REPORT OF GRAM NEGATIVE RODS GROWING IN 2ND OF 4 BOTTLES DRAWN (DIFFERENT SITE=L WRIST) CALLED TO AND READ BACK BY Clara Rascon RN AT 15:51 ON 20 BY [facility]. Culture result: REMAINING BOTTLE(S) HAS/HAVE NO GROWTH SO FAR 2019 03:24 PM       Radiology:     Line/Insert Date:           Assessment:     1.  UTI--Serratia (2 biotypes) from culture and small amount of Enterococcus--now with Pseudomonas from culture of urine and blood  2. CHF/cardiomyopathy  3. ?aspiration event(s)  4. Renal insufficiency  5. Respiratory difficulties    Objective:     1.  Continue current therapy      Yara Charles MD

## 2020-01-04 NOTE — PROGRESS NOTES
[facility] VAD/Heart Failure Progress Note    Admit Date: 10/25/2019  POD:  40 Days Post-Op    Procedure:  Procedure(s):  LEFT BRACHIAL THROMBECTOMY        Subjective:   Dyspnea, fatigue, and weakness; denies chest pain; working on depression      Objective:   Vitals:  Blood pressure 91/72, pulse 80, temperature 97.7 °F (36.5 °C), resp. rate 18, height 6' 2\" (1.88 m), weight 167 lb 8.8 oz (76 kg), SpO2 97 %.   Temp (24hrs), Av.2 °F (36.8 °C), Min:97.7 °F (36.5 °C), Max:98.5 °F (36.9 °C)    Hemodynamics:   CO: CO (l/min): 5.8 l/min   CI: CI (l/min/m2): 2.8 l/min/m2   CVP: CVP (mmHg): 4 mmHg (19 1645)   SVR: SVR (dyne*sec)/cm5: 1080 (dyne*sec)/cm5 (19 4178)   PAP Systolic: PAP Systolic: 34 (33 7175)   PAP Diastolic: PAP Diastolic: 13 (72/79/56 1091)   PVR:     SV02: SVO2 (%): 67 % (19 1300)   SCV02: SCVO2 (%): 75 % (10/29/19 1900)    VAD Interrogation: LVAD (Heartmate)  Pump Speed (RPM): 6400  Pump Flow (LPM): 5.9  PI (Pulsitility Index): 3.2  Power: 5.6  MAP: 96   Test: Yes  Back Up  at Bedside & Labeled: Yes  Power Module Test: Yes  Driveline Site Care: Yes  Driveline Dressing: Clean, Dry, and Intact    EKG/Rhythm:      Extubation Date / Time:     CT Output:     Ventilator:  Ventilator Volumes  Vt Set (ml): 550 ml (19 08)  Vt Exhaled (Machine Breath) (ml): 639 ml (19 08)  Vt Spont (ml): 437 ml (19 1035)  Ve Observed (l/min): 7.25 l/min (19 1035)    Oxygen Therapy:  Oxygen Therapy  O2 Sat (%): 97 % (20 1013)  Pulse via Oximetry: 104 beats per minute (20 1013)  O2 Device: Nasal cannula (20 1013)  PEEP/CPAP (cm H2O): 1 cm H20 (20 1013)  O2 Flow Rate (L/min): 1 l/min (20 0800)  FIO2 (%): 21 % (19 0823)    CXR:    Admission Weight: Last Weight   Weight: 192 lb 10.9 oz (87.4 kg) Weight: 167 lb 8.8 oz (76 kg)     Intake / Output / Drain:  Current Shift:  0701 -  1900  In: 1041 [P.O.:240; I.V.:801]  Out: 1750 [Urine:1750]  Last 24 hrs.:     Intake/Output Summary (Last 24 hours) at 1/4/2020 1040  Last data filed at 1/4/2020 0859  Gross per 24 hour   Intake 1778.03 ml   Output 2800 ml   Net -1021.97 ml           No results for input(s): CPK, CKMB, TROIQ in the last 72 hours.   Recent Labs     01/04/20  0617 01/03/20  0351 01/02/20  0436   * 131* 129*   K 4.8 4.6 4.3   CO2 25 26 27   BUN 20 19 21*   CREA 1.03 1.02 1.15   * 97 104*   MG 1.8 1.9 1.9   WBC 6.1 5.6 7.5   HGB 8.8* 8.5* 8.2*   HCT 28.5* 28.0* 26.0*   * 124* 123*     Recent Labs     01/04/20  0617 01/03/20  0351 01/02/20  0436   INR 1.4* 1.4* 1.4*   PTP 13.5* 14.0* 14.0*   APTT 32.5* 36.1* 38.2*     No lab exists for component: PBNP        Current Facility-Administered Medications:     warfarin (COUMADIN) tablet 3 mg, 3 mg, Oral, ONCE, Mounika Altman MD    thiamine HCL (B-1) tablet 100 mg, 100 mg, Oral, DAILY, Serrano Tyler E, NP, 100 mg at 01/04/20 0900    venlafaxine-SR (EFFEXOR-XR) capsule 75 mg, 75 mg, Oral, DAILY WITH BREAKFAST, Serrano Tyler, NP, 75 mg at 01/04/20 0859    midodrine (PROAMITINE) tablet 2.5 mg, 2.5 mg, Oral, BID WITH MEALS, Serrano Tyler E, NP, 2.5 mg at 01/04/20 0925    levoFLOXacin (LEVAQUIN) 750 mg in D5W IVPB, 750 mg, IntraVENous, Q24H, Harley Ratliff MD, Last Rate: 100 mL/hr at 01/03/20 1608, 750 mg at 01/03/20 1608    cefepime (MAXIPIME) 2 g in 0.9% sodium chloride (MBP/ADV) 100 mL, 2 g, IntraVENous, Q8H, Akbar Olivares MD, Last Rate: 200 mL/hr at 01/04/20 0859, 2 g at 01/04/20 0859    sildenafil (pulm.hypertension) (REVATIO) tablet 10 mg, 10 mg, Oral, TID, Zach Tyler E, NP, 10 mg at 01/04/20 0859    oxybutynin (DITROPAN) tablet 5 mg, 5 mg, Oral, Q6H PRN, Shana Sims, NP, 5 mg at 01/01/20 1204    [Held by provider] bumetanide (BUMEX) injection 1 mg, 1 mg, IntraVENous, BID, Zach Tyler E, NP, 1 mg at 12/30/19 6550    magnesium oxide (MAG-OX) tablet 800 mg, 800 mg, Oral, BID, Zach ACUNA, NP, 800 mg at 01/04/20 0859    [Held by provider] milrinone (PRIMACOR) 20 MG/100 ML D5W infusion, 0.125 mcg/kg/min, IntraVENous, CONTINUOUS, Shana Sims NP, Last Rate: 2.8 mL/hr at 01/02/20 0121, 0.125 mcg/kg/min at 01/02/20 0121    albumin human 5% (BUMINATE) solution 12.5 g, 12.5 g, IntraVENous, DIALYSIS PRN, Logan Kincaid MD    lidocaine (URO-JET/ GLYDO) 2 % jelly, , Urethral, PRN, Mounika Altman MD    senna-docusate (PERICOLACE) 8.6-50 mg per tablet 1 Tab, 1 Tab, Oral, DAILY, Marta Herrera NP, Stopped at 01/04/20 0900    epoetin yvonne-epbx (RETACRIT) injection 20,000 Units, 20,000 Units, SubCUTAneous, Q MON, WED & FRI, Logan Kincaid MD, 20,000 Units at 01/03/20 2101    levETIRAcetam (KEPPRA) tablet 125 mg, 125 mg, Oral, BID, Adrianna Herrera NP, 125 mg at 01/04/20 0859    polyethylene glycol (MIRALAX) packet 17 g, 17 g, Oral, DAILY, Marta Herrera NP, Stopped at 01/04/20 0900    albuterol-ipratropium (DUO-NEB) 2.5 MG-0.5 MG/3 ML, 3 mL, Nebulization, Q6H PRN, Stefano Gaytan MD, 3 mL at 12/25/19 1407    therapeutic multivitamin (THERAGRAN) tablet 1 Tab, 1 Tab, Oral, DAILY, Levi Shana B, NP, 1 Tab at 01/04/20 0859    potassium chloride SR (KLOR-CON 10) tablet 40 mEq, 40 mEq, Oral, DAILY, Levi Shana B, NP, 40 mEq at 01/04/20 0925    alteplase (CATHFLO) 1 mg in sterile water (preservative free) 1 mL injection, 1 mg, InterCATHeter, PRN, Mounika Altman MD, 1 mg at 12/31/19 1121    finasteride (PROSCAR) tablet 5 mg, 5 mg, Oral, DAILY, Gege Evans MD, 5 mg at 01/04/20 0859    spironolactone (ALDACTONE) tablet 25 mg, 25 mg, Oral, DAILY, Mounika Altman MD, 25 mg at 01/04/20 0859    clonazePAM (KlonoPIN) tablet 0.5 mg, 0.5 mg, Oral, TID PRN, Mounika Shankar MD, 0.5 mg at 01/01/20 9185    diphenhydrAMINE (BENADRYL) capsule 25 mg, 25 mg, Oral, QHS PRN, Marta Herrera, NP, 25 mg at 01/04/20 0017    octreotide (SANDOSTATIN) injection 25 mcg, 25 mcg, SubCUTAneous, TID, Polliard, Francezachery Nicole T, NP, 25 mcg at 01/04/20 0900    benzocaine-menthol (CEPACOL) lozenge 1 Lozenge, 1 Lozenge, Mucous Membrane, PRN, Pollliborio, Viviana Pretty, NP    digoxin (LANOXIN) tablet 0.0625 mg, 62.5 mcg, Oral, Q MON, WED & FRI, Sharon, Viviana Tejada, NP, 0.0625 mg at 01/03/20 2100    pantoprazole (PROTONIX) tablet 40 mg, 40 mg, Oral, ACB, Polliard, [place] Bonnie JOHN, NP, 40 mg at 01/04/20 0651    allopurinol (ZYLOPRIM) tablet 100 mg, 100 mg, Oral, DAILY, Polliard, [place] LOPEZ, NP, 100 mg at 01/04/20 0859    arformoterol (BROVANA) neb solution 15 mcg, 15 mcg, Nebulization, BID RT, 15 mcg at 01/04/20 1012 **AND** budesonide (PULMICORT) 500 mcg/2 ml nebulizer suspension, 500 mcg, Nebulization, BID RT, Rin Herrera NP, 500 mcg at 01/04/20 1012    artificial saliva (MOUTH KOTE) 1 Spray, 1 Spray, Oral, PRN, Sharon, Viviana Tejada, NP, 1 Spray at 12/12/19 1452    lactobac ac& pc-s.therm-b.anim (TIAN Q/RISAQUAD), 1 Cap, Oral, DAILY, Roberto Carlos Payton MD, 1 Cap at 01/04/20 0900    oxyCODONE IR (ROXICODONE) tablet 5 mg, 5 mg, Oral, Q6H PRN, Era Martinez MD, 5 mg at 12/30/19 0405    tamsulosin (FLOMAX) capsule 0.4 mg, 0.4 mg, Oral, DAILY, Roma Medellin MD, 0.4 mg at 01/04/20 0859    insulin lispro (HUMALOG) injection, , SubCUTAneous, AC&HS, Shana Sims, NP, 2 Units at 01/04/20 0714    Warfarin MD/NP dosing, , Other, PRN, Mounika Altman MD    sodium chloride (NS) flush 5-40 mL, 5-40 mL, IntraVENous, Q8H, Harshal Dow MD, 10 mL at 01/04/20 0651    sodium chloride (NS) flush 5-40 mL, 5-40 mL, IntraVENous, PRN, Era Martinez MD, 10 mL at 01/02/20 2342    acetaminophen (TYLENOL) tablet 650 mg, 650 mg, Oral, Q6H PRN, Era Martinez MD, 650 mg at 01/02/20 2213    naloxone (NARCAN) injection 0.4 mg, 0.4 mg, IntraVENous, PRN, Era Martinez MD    ondansetron [facility]) injection 4 mg, 4 mg, IntraVENous, Q4H PRN, Mitchel Mccord MD, 4 mg at 01/03/20 1748    bisacodyl (DULCOLAX) tablet 5 mg, 5 mg, Oral, DAILY PRN, Mitchel Mccord MD, 5 mg at 12/22/19 1533    sucralfate (CARAFATE) tablet 1 g, 1 g, Oral, AC&HS, Mitchel Mccord MD, 1 g at 01/04/20 0651    influenza vaccine 2019-20 (6 mos+)(PF) (FLUARIX/FLULAVAL/FLUZONE QUAD) injection 0.5 mL, 0.5 mL, IntraMUSCular, PRIOR TO DISCHARGE, Asuncion Altman MD    hydrALAZINE (APRESOLINE) 20 mg/mL injection 20 mg, 20 mg, IntraVENous, Q6H PRN, Shana Sims NP, 20 mg at 12/10/19 0541    dextrose 10 % infusion 125-250 mL, 125-250 mL, IntraVENous, PRN, Mounika Limon MD, Stopped at 11/18/19 0700    A/P     S/P LVAD - good flows  Need for anti-coagulation - coumadin  DM - insulin  RV dysfunction - milrinone, diuretics      Risk of morbidity and mortality - high  Medical decision making - high complexity    Signed By: Mary Aj MD

## 2020-01-04 NOTE — PROGRESS NOTES
[facility] in [place], [place]  Inpatient Progress Note      Patient name: Wicho Link  Patient : 1950  Patient MRN: [id number]  Attending MD: Obdulia Agarwal MD  Date of service: 20    Chief Complaint:   Lexie Wabash azucena LVAD implant     HPI: Sona Kiser is a 71y.o. year old pleasant white male with a history of HTN, HLD, JOSE, CAD s/p cardiac arrest VFib s/p CABG () c/b sternal would infection and sternectomy, ischemic cardiomyopathy LVEF 15-20%, s/p ICD and with LBBB. He was admitted with acute on chronic systolic heart failure with massive volume overload > 20 lbs, in the setting of atrial fibrillation s/p failed DCCV and underwent DCCV and RHC on .  S/p BiVICD on 19 with Dr. Tammi Enriquez. He was discharged home home on IV milrinone on 19. [facility] has been followed closely by Dr. Christy Guevara and the [facility].     Mr. Kiser was admitted for acute on chronic systolic heart failure. He underwent implantation of Impella 5.0 due to CS and has completed his LVAD evaluation. [facility] meets criteria for implant of HM3 as DT. He was NYHA Class IV prior to implant of Impella 5.0, has LVEF < 15%, was intolerant of GDMT due to symptomatic hypotension and renal dysfunction. He remains dependent on temporary MCS support. RHC  revealed compensated hemodynamics on Impella. His renal function has improved dramatically with improvement in his hemodynamics. He is not a suitable transplant candidate due to single kidney, sternectomy, debility, and frailty. He was reviewed by [facility] and felt to be a high risk heart transplant candidate due to multiple co-morbidities as well as the afore mentioned conditions.  He remained in the CCU and underwent a HM 3 implant as DT on .   He was weaned off of pressors and transferred to [place] [id number] where he continues to undergo PT/OT and hemodynamic optimization.       24Hr Events  2/4 Blood cultures positive for pseudomonas - on cefepime  Sat in the chair  Appetite improving - eating [employer] from [employer]    Procedure:  Procedure(s):  REMOVAL TEMPORARY LEFT VENTRICULAR ASSIST DEVICE, IMPLANTATION OF LEFT VENTRICULAR ASSIST DEVICE PERMANENT (VAD), ECC, JACQUE, EPI AORTIC US BY DR Taylor Ricci.     POD:  46     Impression / Plan:   Heart Failure Status: NYHA Class IV    Chronic systolic heart failure due to ICM, NYHA Class IV, EF < 15%, cardiogenic shock bridged with Impella 5.0 support to HM 3 implant   S/p Impella removal and LVAD implant 11/18/19  RPMs 6400 rpm - frequent PI events  TTE with bubble study negative for PFO  Obtain CardioMEMS readings daily- labeled \"suspect\" per [employer] website/unreliable  Holding diuretics  Encourage PO fluids  Increase Sildenafil to 20 mg PO TID  Intolerant of BB due to RV failure  Intolerant of ACEi/ARB/ARNI/AA due to JUAN J on CKD 3  Continue midodrine 2.5mg po BID for orthostatic hypotension  Daily orthostatics  Strict I/O, daily weights  Continue LVAD education   Dressing change frequency three times weekly, Sutures removed 12/26/19  Delayed skin healing on bottom portion of sternotomy- evaluated by CTS, no intervention   TTE 12/16- EF 15-20%    Bacteremia - Pseudomonas  Blood cultures (12/31/19) 2/4 bottles +Pseudomonas & 2/4 bottles GNRs    On IV Cefepime and Levaquin   Follow procalcitonin and lactic acid levels    Generalized myoclonus   Improved   Appreciate Neurology input  Decreased Keppra due to somnolence - 125 mg po BID   Head CT negative for acute process  EEG suggestive of mild generalized encephalopathic process, possibly related to underlying structural brain injury vs metabolic abnormality  Monitor closely      Anticoagulation for LVAD, INR goal 1.5-2  Goal decreased d/t persistent hematuria   No ASA d/t hematuria  INR 1.4  Coumadin - 3 mg tonight    Epistaxis  Resolved      Acute Respiratory Failure post op  Improved with aggressive diuresis  Off supplemental O2  Pulmonary hygiene   Cont home CPAP- must use while sleeping   Schedule PET CT prior to discharge    Acute on CKD 3, atrophic left kidney  Appreciate Nephrology assistance  Watch Cr, stable  Hold diuretics for positive orthostatics   Avoid nephrotoxins, trend labs   Renally dose meds      CAD s/p CABG   Off ASA d/t hematuria  No BB d/t RV failure  Hold statin due to recent hepatic failure   LHC performed 11/13 - low likelihood of benefit from revascularization      Hx of VF arrest s/p BiV-ICD  No recent shocks  Keep K > 4 and Mg > 2   Mag ox to 800mg po BID    PAF   Dig level 0.6-cont dig (62.5 mcg qMWF)  No BB due to RV failure   Repeat TFTs WNL     Hx of gout  Uric acid WNL    Acute blood loss anemia  Likely due to hematuria  Cont octreotide 25 mcg SQ TID   Fecal occult 12/14 negative, positive on 12/17  Appreciate urology recommendations  Hematuria improved  Monitor for now     Suspected aspiration pneumonia  Sputum culture showing scant growth of yeast  Repeat Sputum culture (12/31/19) light normal resp soren    Urinary retention, c/b serratia UTI and hematuria  Appreciate Urology consult   Repeat UA with cx negative 12/15   Cystoscopy performed 11/13 shows catheter induced cystitis   Pseudomonas aeruginosa positive UA culture (12/31/19) - on Cefepmine    Malnutrition   Eating better  Prealbumin weekly - 12.6 on 12/30   Appreciate Nutritionist assistance  Diet as tolerated, encourage food from home, protein shakes  Intolerant of mirtazapine d/t tremors, confusion   D/C Marinol d/t polypharmacy  Cont MVI add thiamine 100mg daily   IR to place [place] for supplemental feeds on Monday    COPD   Appreciate Pulmonology assistance   Continue nebs PRN       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  No additional treatment at this time     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  Etiology unclear  Continue [facility]  Appreciate neurology assistance      Hx of sternal wound infection, sternectomy  Sternum closed post op- monitor     Vocal cord paralysis  Improved  ENT recs appreciated  Neck CT- R neck edema, no airway compromise   Speech therapy following - appreciate input    Anxiety  Klonipin 0.5 mg TID prn anxiety    Depression  Stop lexapro d/t hyponatremia   Continue Effexor XR 75mg daily  Monitor QTc - 478 ms on 12/22   Monitor rhythm strips for prolonged QTc     Debility  Continue PT/OT     Bladder spasms  Received pyridium 100mg x4 doses  Oxybutynin 5mg Q6hr prn      Ppx  Protonix   PT/OT   Bowel regimen   PICC placed 11/22/19      Dispo:  Remain in CVSU at this time  Will need IP rehab. Not ready for discharge at this time        Thank you for allowing us to participate in your patient's care. Mounika Turner MD, [facility], [address] [place] [address] of Cardiology, [address] [place] [address]  Office [phone]  Fax [phone]        LVAD INTERROGATION:  Device interrogated in person  Device function normal, normal flow, multiple PI events    LVAD   Pump Speed (RPM): 6400  Pump Flow (LPM): 5.9  MAP: 85  PI (Pulsitility Index): 3.2  Power: 5.6   Test: No  Back Up  at Bedside & Labeled: Yes  Power Module Test: No  Driveline Site Care: No  Driveline Dressing: Clean, Dry, and Intact  Outpatient: No  MAP in Therapeutic Range (Outpatient): Yes  Testing  Alarms Reviewed: Yes  Back up SC speed: 6400  Back up Low Speed Limit: 6000  Emergency Equipment with Patient?: Yes  Emergency procedures reviewed?: Yes  Drive line site inspected?: Yes  Drive line intergrity inspected?: Yes  Drive line dressing changed?: Yes    PHYSICAL EXAM:  Visit Vitals  BP 91/72 (BP 1 Location: Left arm, BP Patient Position: At rest)   Pulse 80   Temp 97.7 °F (36.5 °C)   Resp 18   Ht 6' 2\" (1.88 m)   Wt 167 lb 8.8 oz (76 kg)   SpO2 96%   BMI 21.51 kg/m²       Physical Exam   Constitutional: He is oriented to person, place, and time. He appears well-developed. He appears cachectic.  He appears ill. No distress. HENT:   Head: Normocephalic. Neck: Normal range of motion. Neck supple. No JVD present. Cardiovascular: Normal rate, regular rhythm and normal heart sounds. + VAD hum    Pulmonary/Chest: Effort normal and breath sounds normal. No tachypnea. No respiratory distress. Abdominal: Soft. Bowel sounds are normal. He exhibits no distension. Musculoskeletal: Normal range of motion. General: No edema. Neurological: He is alert and oriented to person, place, and time. Skin: Skin is warm and dry. Psychiatric: He has a normal mood and affect. His speech is normal and behavior is normal.   Nursing note and vitals reviewed. REVIEW OF SYSTEMS:  Review of Systems   Constitutional: Positive for malaise/fatigue. Negative for chills and fever. HENT: Positive for hearing loss. Negative for nosebleeds. Eyes: Negative. Respiratory: Negative for cough and shortness of breath. Mild dyspnea   Cardiovascular: Negative for chest pain, palpitations, orthopnea and leg swelling. Orthostatic   Gastrointestinal: Negative for heartburn, nausea and vomiting. Genitourinary: Positive for dysuria. Negative for hematuria. Bladder spasms    Musculoskeletal: Negative. Neurological: Positive for dizziness and weakness. Negative for headaches. Endo/Heme/Allergies: Does not bruise/bleed easily.        PAST MEDICAL HISTORY:  Past Medical History:   Diagnosis Date    Degenerative disc disease, lumbar     Heart failure ([address]     High cholesterol     Hypertension     Paroxysmal atrial fibrillation ([address] 4/2/2019    Spinal stenosis        PAST SURGICAL HISTORY:  Past Surgical History:   Procedure Laterality Date    COLONOSCOPY Left 11/11/2019    COLONOSCOPY at bedside performed by Sid Ray MD at [address] HX APPENDECTOMY      HX CORONARY ARTERY BYPASS GRAFT      triple    HX HERNIA REPAIR      HX IMPLANTABLE CARDIOVERTER DEFIBRILLATOR      AR CARDIOVERSION ELECTIVE ARRHYTHMIA EXTERNAL N/A 6/10/2019    EP CARDIOVERSION performed by Mario Salvador MD at [address], [facility] CATH LAB    [place] CARDIOVERSION ELECTIVE ARRHYTHMIA EXTERNAL N/A 2019    EP CARDIOVERSION performed by Aleshia Martinez MD at [address], [facility] CATH LAB    [place] INSJ/RPLCMT PERM DFB W/TRNSVNS LDS 1/DUAL CHMBR N/A 2019    INSERT ICD BIV MULTI performed by Veronica Christopher MD at [address], [facility] CATH LAB    [place] TCAT IMPL WRLS P-ART PRS SNR L-T HEMODYN MNTR N/A 2019    IMPLANT HEART FAILURE MONITORING DEVICE performed by Abhijeet Marlow MD at [address], [facility] CATH LAB       FAMILY HISTORY:  Family History   Problem Relation Age of Onset    Lung Disease Mother     Hypertension Mother     Arthritis-osteo Mother     Heart Disease Mother     Heart Disease Father     Diabetes Father        SOCIAL HISTORY:  Social History     Socioeconomic History    Marital status:      Spouse name: Not on file    Number of children: Not on file    Years of education: Not on file    Highest education level: Not on file   Tobacco Use    Smoking status: Former Smoker     Last attempt to quit: 2010     Years since quittin.0    Smokeless tobacco: Never Used   Substance and Sexual Activity    Alcohol use: Not Currently     Comment: rarely    Drug use: Never   Other Topics Concern       LABORATORY RESULTS:     Labs Latest Ref Rng & Units 2020 1/3/2020 2020 2020 2020 2019 2019   WBC 4.1 - 11.1 K/uL 6.1 5.6 7.5 - 7.6 - -   RBC 4.10 - 5.70 M/uL 3.05(L) 2.97(L) 2.77(L) - 2.79(L) - -   Hemoglobin 12.1 - 17.0 g/dL 8.8(L) 8.5(L) 8.2(L) 8.4(L) 8.2(L) 8.0(L) 6. 3(L)   Hematocrit 36.6 - 50.3 % 28. 5(L) 28. 0(L) 26. 0(L) 26. 9(L) 26. 8(L) 25. 8(L) 20. 5(L)   MCV 80.0 - 99.0 FL 93.4 94.3 93.9 - 96.1 - -   Platelets 678 - 365 K/uL 131(L) 124(L) 123(L) - 133(L) - -   Lymphocytes 12 - 49 % - - - - - - -   Monocytes 5 - 13 % - - - - - - -   Eosinophils 0 - 7 % - - - - - - -   Basophils 0 - 1 % - - - - - - -   Albumin 3.5 - 5.0 g/dL 2. 4(L) 2. 5(L) 2. 6(L) - 2. 8(L) - -   Calcium 8.5 - 10.1 MG/DL 8. 3(L) 8.6 8.9 - 8.7 - -   SGOT 15 - 37 U/L 21 18 20 - 21 - -   Glucose 65 - 100 mg/dL 105(H) 97 104(H) - 96 - -   BUN 6 - 20 MG/DL 20 19 21(H) - 21(H) - -   Creatinine 0.70 - 1.30 MG/DL 1.03 1.02 1.15 - 1.35(H) - -   Sodium 136 - 145 mmol/L 132(L) 131(L) 129(L) - 132(L) - -   Potassium 3.5 - 5.1 mmol/L 4.8 4.6 4.3 - 4.6 - -   TSH 0.36 - 3.74 uIU/mL - - - - - - -   LDH 85 - 241 U/L 319(H) 166 162 - 167 - -   CEA ng/mL - - - - - - -   Some recent data might be hidden     Lab Results   Component Value Date/Time    TSH 2.30 12/03/2019 03:29 AM    TSH 2.40 10/25/2019 07:39 PM    TSH 2.45 06/01/2019 04:16 AM       ALLERGY:  No Known Allergies     CURRENT MEDICATIONS:  Current Facility-Administered Medications   Medication Dose Route Frequency    warfarin (COUMADIN) tablet 3 mg  3 mg Oral ONCE    thiamine HCL (B-1) tablet 100 mg  100 mg Oral DAILY    venlafaxine-SR (EFFEXOR-XR) capsule 75 mg  75 mg Oral DAILY WITH BREAKFAST    midodrine (PROAMITINE) tablet 2.5 mg  2.5 mg Oral BID WITH MEALS    levoFLOXacin (LEVAQUIN) 750 mg in D5W IVPB  750 mg IntraVENous Q24H    cefepime (MAXIPIME) 2 g in 0.9% sodium chloride (MBP/ADV) 100 mL  2 g IntraVENous Q8H    sildenafil (pulm.hypertension) (REVATIO) tablet 10 mg  10 mg Oral TID    oxybutynin (DITROPAN) tablet 5 mg  5 mg Oral Q6H PRN    [Held by provider] bumetanide (BUMEX) injection 1 mg  1 mg IntraVENous BID    magnesium oxide (MAG-OX) tablet 800 mg  800 mg Oral BID    [Held by provider] milrinone (PRIMACOR) 20 MG/100 ML D5W infusion  0.125 mcg/kg/min IntraVENous CONTINUOUS    albumin human 5% (BUMINATE) solution 12.5 g  12.5 g IntraVENous DIALYSIS PRN    lidocaine (URO-JET/ GLYDO) 2 % jelly   Urethral PRN    senna-docusate (PERICOLACE) 8.6-50 mg per tablet 1 Tab  1 Tab Oral DAILY    epoetin yvonne-epbx (RETACRIT) injection 20,000 Units  20,000 Units SubCUTAneous Q MON, WED & FRI    levETIRAcetam (KEPPRA) tablet 125 mg  125 mg Oral BID    polyethylene glycol (MIRALAX) packet 17 g  17 g Oral DAILY    albuterol-ipratropium (DUO-NEB) 2.5 MG-0.5 MG/3 ML  3 mL Nebulization Q6H PRN    therapeutic multivitamin (THERAGRAN) tablet 1 Tab  1 Tab Oral DAILY    potassium chloride SR (KLOR-CON 10) tablet 40 mEq  40 mEq Oral DAILY    alteplase (CATHFLO) 1 mg in sterile water (preservative free) 1 mL injection  1 mg InterCATHeter PRN    finasteride (PROSCAR) tablet 5 mg  5 mg Oral DAILY    spironolactone (ALDACTONE) tablet 25 mg  25 mg Oral DAILY    clonazePAM (KlonoPIN) tablet 0.5 mg  0.5 mg Oral TID PRN    diphenhydrAMINE (BENADRYL) capsule 25 mg  25 mg Oral QHS PRN    octreotide (SANDOSTATIN) injection 25 mcg  25 mcg SubCUTAneous TID    benzocaine-menthol (CEPACOL) lozenge 1 Lozenge  1 Lozenge Mucous Membrane PRN    digoxin (LANOXIN) tablet 0.0625 mg  62.5 mcg Oral Q MON, WED & FRI    pantoprazole (PROTONIX) tablet 40 mg  40 mg Oral ACB    allopurinol (ZYLOPRIM) tablet 100 mg  100 mg Oral DAILY    arformoterol (BROVANA) neb solution 15 mcg  15 mcg Nebulization BID RT    And    budesonide (PULMICORT) 500 mcg/2 ml nebulizer suspension  500 mcg Nebulization BID RT    artificial saliva (MOUTH KOTE) 1 Spray  1 Spray Oral PRN    lactobac ac& pc-s.therm-b.anim (TIAN Q/RISAQUAD)  1 Cap Oral DAILY    oxyCODONE IR (ROXICODONE) tablet 5 mg  5 mg Oral Q6H PRN    tamsulosin (FLOMAX) capsule 0.4 mg  0.4 mg Oral DAILY    insulin lispro (HUMALOG) injection   SubCUTAneous AC&HS    Warfarin MD/NP dosing   Other PRN    sodium chloride (NS) flush 5-40 mL  5-40 mL IntraVENous Q8H    sodium chloride (NS) flush 5-40 mL  5-40 mL IntraVENous PRN    acetaminophen (TYLENOL) tablet 650 mg  650 mg Oral Q6H PRN    naloxone (NARCAN) injection 0.4 mg  0.4 mg IntraVENous PRN    ondansetron (ZOFRAN) injection 4 mg  4 mg IntraVENous Q4H PRN    bisacodyl (DULCOLAX) tablet 5 mg  5 mg Oral DAILY PRN    sucralfate (CARAFATE) tablet 1 g  1 g Oral AC&HS    influenza vaccine 2019-20 (6 mos+)(PF) (FLUARIX/FLULAVAL/FLUZONE QUAD) injection 0.5 mL  0.5 mL IntraMUSCular PRIOR TO DISCHARGE    hydrALAZINE (APRESOLINE) 20 mg/mL injection 20 mg  20 mg IntraVENous Q6H PRN    dextrose 10 % infusion 125-250 mL  125-250 mL IntraVENous PRN         Thank you for allowing me to participate in this patient's care.     Lamin Calvo MD  [address] [place] [address], [address]  Phone: [phone]  Fax: [phone]

## 2020-01-04 NOTE — PROGRESS NOTES
0730:  Bedside shift change report given to Kaley Cam RN (oncoming nurse) by My Mi RN (offgoing nurse). Report included the following information SBAR, Kardex, Procedure Summary, Intake/Output, MAR, and Recent Results. 0800:  Patient up to chair. 1159:  Patient had bowel movement in BSC, then returned to bed.    1600:  Patient up to chair. 1800:  Patient switched to battery and then back to power with minimal assistance. 1930:  Bedside shift change report given to Penelope Vasquez RN (oncoming nurse) by Kaley Cam RN (offgoing nurse). Report included the following information SBAR, Kardex, Procedure Summary, Intake/Output, MAR and Recent Results. Problem: Falls - Risk of  Goal: *Absence of Falls  Description  Document Easter Getting Fall Risk and appropriate interventions in the flowsheet. Outcome: Progressing Towards Goal  Note: Fall Risk Interventions:  Mobility Interventions: Patient to call before getting OOB    Mentation Interventions: Adequate sleep, hydration, pain control, Door open when patient unattended, Eyeglasses and hearing aids    Medication Interventions: Teach patient to arise slowly, Utilize gait belt for transfers/ambulation, Patient to call before getting OOB    Elimination Interventions: Call light in reach, Patient to call for help with toileting needs    History of Falls Interventions: Consult care management for discharge planning         Problem: Pain  Goal: *Control of Pain  Outcome: Progressing Towards Goal     Problem: Pressure Injury - Risk of  Goal: *Prevention of pressure injury  Description  Document Mich Scale and appropriate interventions in the flowsheet.   Outcome: Progressing Towards Goal  Note: Pressure Injury Interventions:  Sensory Interventions: Assess changes in LOC, Keep linens dry and wrinkle-free, Minimize linen layers, Monitor skin under medical devices, Pressure redistribution bed/mattress (bed type)    Moisture Interventions: Apply protective barrier, creams and emollients, Maintain skin hydration (lotion/cream), Moisture barrier    Activity Interventions: Increase time out of bed, Pressure redistribution bed/mattress(bed type)    Mobility Interventions: HOB 30 degrees or less, Pressure redistribution bed/mattress (bed type)    Nutrition Interventions: Document food/fluid/supplement intake, Discuss nutritional consult with provider, Offer support with meals,snacks and hydration    Friction and Shear Interventions: Apply protective barrier, creams and emollients, HOB 30 degrees or less, Minimize layers, Sit at 90-degree angle                Problem: Heart Failure: Discharge Outcomes  Goal: *Demonstrates ability to perform prescribed activity without shortness of breath or discomfort  Outcome: Progressing Towards Goal     Problem: LVAD Post-op Day 15 to Discharge  Goal: Activity/Safety  Outcome: Progressing Towards Goal

## 2020-01-05 NOTE — PROGRESS NOTES
[facility]   [address] [facility], [address], [facility]  Phone: [phone]   [phone]       Nephrology Progress Note  Zay Wheeler     [birth year]     [id number]  Date of Admission : 10/25/2019  01/05/20    CC: Follow up for JUAN J, CKD, Hyponatremia      Assessment and Plan   JUAN J on CKD:  - resolving and stable  - Cr stable  - cont present care    GNR bacteremia:  - likely urinary source    Pseudomonal UTI:  - from cultures on 12/29  - abx per ID    Orthostatic hypotension w/ syncope:  - holding diuretics for now    Hyponatremia :  - Na stable at 130  - daily Na levels for now    Gross hematuria, BPH w/ retention:  - off CBI pre VAD. 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 CT in place    L arm clot s/p thrombectomy on 11/25    JOSE on CPAP      PAF s/p DCCV 6/19     Anemia:  - from blood loss s/p multiple blood products  - s/p IV iron and PAVEL     Interval History:    Seen and examined. Na and Cr stable. BP stable.   No cp or sob reported    Review of Systems: as per HPI    Current Medications:   Current Facility-Administered Medications   Medication Dose Route Frequency    warfarin (COUMADIN) tablet 4 mg  4 mg Oral ONCE    midodrine (PROAMITINE) tablet 5 mg  5 mg Oral BID WITH MEALS    midodrine (PROAMITINE) tablet 2.5 mg  2.5 mg Oral ONCE    sildenafil (pulm.hypertension) (REVATIO) tablet 20 mg  20 mg Oral TID    thiamine HCL (B-1) tablet 100 mg  100 mg Oral DAILY    venlafaxine-SR (EFFEXOR-XR) capsule 75 mg  75 mg Oral DAILY WITH BREAKFAST    levoFLOXacin (LEVAQUIN) 750 mg in D5W IVPB  750 mg IntraVENous Q24H    cefepime (MAXIPIME) 2 g in 0.9% sodium chloride (MBP/ADV) 100 mL  2 g IntraVENous Q8H    oxybutynin (DITROPAN) tablet 5 mg  5 mg Oral Q6H PRN    magnesium oxide (MAG-OX) tablet 800 mg  800 mg Oral BID    [Held by provider] milrinone (PRIMACOR) 20 MG/100 ML D5W infusion  0.125 mcg/kg/min IntraVENous CONTINUOUS    albumin human 5% (BUMINATE) solution 12.5 g  12.5 g IntraVENous DIALYSIS PRN    lidocaine (URO-JET/ GLYDO) 2 % jelly   Urethral PRN    senna-docusate (PERICOLACE) 8.6-50 mg per tablet 1 Tab  1 Tab Oral DAILY    epoetin yvonne-epbx (RETACRIT) injection 20,000 Units  20,000 Units SubCUTAneous Q MON, WED & FRI    levETIRAcetam (KEPPRA) tablet 125 mg  125 mg Oral BID    polyethylene glycol (MIRALAX) packet 17 g  17 g Oral DAILY    albuterol-ipratropium (DUO-NEB) 2.5 MG-0.5 MG/3 ML  3 mL Nebulization Q6H PRN    therapeutic multivitamin (THERAGRAN) tablet 1 Tab  1 Tab Oral DAILY    potassium chloride SR (KLOR-CON 10) tablet 40 mEq  40 mEq Oral DAILY    alteplase (CATHFLO) 1 mg in sterile water (preservative free) 1 mL injection  1 mg InterCATHeter PRN    finasteride (PROSCAR) tablet 5 mg  5 mg Oral DAILY    spironolactone (ALDACTONE) tablet 25 mg  25 mg Oral DAILY    clonazePAM (KlonoPIN) tablet 0.5 mg  0.5 mg Oral TID PRN    diphenhydrAMINE (BENADRYL) capsule 25 mg  25 mg Oral QHS PRN    octreotide (SANDOSTATIN) injection 25 mcg  25 mcg SubCUTAneous TID    benzocaine-menthol (CEPACOL) lozenge 1 Lozenge  1 Lozenge Mucous Membrane PRN    digoxin (LANOXIN) tablet 0.0625 mg  62.5 mcg Oral Q MON, WED & FRI    pantoprazole (PROTONIX) tablet 40 mg  40 mg Oral ACB    allopurinol (ZYLOPRIM) tablet 100 mg  100 mg Oral DAILY    arformoterol (BROVANA) neb solution 15 mcg  15 mcg Nebulization BID RT    And    budesonide (PULMICORT) 500 mcg/2 ml nebulizer suspension  500 mcg Nebulization BID RT    artificial saliva (MOUTH KOTE) 1 Spray  1 Spray Oral PRN    lactobac ac& pc-s.therm-b.anim (TIAN Q/RISAQUAD)  1 Cap Oral DAILY    oxyCODONE IR (ROXICODONE) tablet 5 mg  5 mg Oral Q6H PRN    tamsulosin (FLOMAX) capsule 0.4 mg  0.4 mg Oral DAILY    insulin lispro (HUMALOG) injection   SubCUTAneous AC&HS    Warfarin MD/NP dosing   Other PRN    sodium chloride (NS) flush 5-40 mL  5-40 mL IntraVENous Q8H    sodium chloride (NS) flush 5-40 mL  5-40 mL IntraVENous PRN    acetaminophen (TYLENOL) tablet 650 mg  650 mg Oral Q6H PRN    naloxone (NARCAN) injection 0.4 mg  0.4 mg IntraVENous PRN    ondansetron (ZOFRAN) injection 4 mg  4 mg IntraVENous Q4H PRN    bisacodyl (DULCOLAX) tablet 5 mg  5 mg Oral DAILY PRN    sucralfate (CARAFATE) tablet 1 g  1 g Oral AC&HS    influenza vaccine 2019-20 (6 mos+)(PF) (FLUARIX/FLULAVAL/FLUZONE QUAD) injection 0.5 mL  0.5 mL IntraMUSCular PRIOR TO DISCHARGE    hydrALAZINE (APRESOLINE) 20 mg/mL injection 20 mg  20 mg IntraVENous Q6H PRN    dextrose 10 % infusion 125-250 mL  125-250 mL IntraVENous PRN      No Known Allergies    Objective:  Vitals:    Vitals:    01/05/20 0254 01/05/20 0445 01/05/20 0739 01/05/20 1112   BP:       Pulse: 83 79 81 87   Resp: 17 18 16 18   Temp: 98.1 °F (36.7 °C) 98.4 °F (36.9 °C) 97.4 °F (36.3 °C) 97.7 °F (36.5 °C)   SpO2: 100% 100% 100% 100%   Weight:  76.2 kg (167 lb 15.9 oz)     Height:         Intake and Output:  01/05 0701 - 01/05 1900  In: 8382 [P.O.:720; I.V.:300]  Out: 1300 [Urine:1300]  01/03 1901 - 01/05 0700  In: 2028 [P.O.:1077; I.V.:951]  Out: 4250 [Urine:4250]    Physical Examination:    General: Elderly man in no acute distress  Neck:  No lines   Resp:  TA  CV:  VAD sounds;   No LE edema  GI:  Soft and non-tender; no distension  Neurologic:  Alert and appropriate; normal speech  :  No Lizama    [x]    High complexity decision making was performed  [x]    Patient is at high-risk of decompensation with multiple organ involvement    Lab Data Personally Reviewed: I have reviewed all the pertinent labs, microbiology data and radiology studies during assessment. Recent Labs     01/05/20  0429 01/04/20  0617 01/03/20  0351   * 132* 131*   K 4.6 4.8 4.6   CL 99 100 99   CO2 26 25 26   * 105* 97   BUN 21* 20 19   CREA 1.09 1.03 1.02   CA 9.0 8.3* 8.6   MG 1.7 1.8 1.9   ALB 2.4* 2.4* 2.5*   SGOT 19 21 18   ALT 16 15 14   INR 1.3* 1.4* 1.4*     Recent Labs     01/05/20  0429 01/04/20  0617 01/03/20  0351   WBC 6.2 6.1 5.6   HGB 8.9* 8.8* 8.5*   HCT 29.3* 28.5* 28.0*   * 131* 124*     No results found for: SDES  Lab Results   Component Value Date/Time    Culture result: LIGHT NORMAL RESPIRATORY TIAN 12/31/2019 04:18 PM    Culture result: (A) 12/31/2019 03:24 PM     PSEUDOMONAS AERUGINOSA ISOLATED FROM 2 OF 4 BOTTLES DRAWN, EACH FROM A DIFFERENT SITE. .. RFA AND LEFT WRIST    Culture result: (A) 12/31/2019 03:24 PM     PRELIMINARY REPORT OF GRAM NEGATIVE RODS GROWING IN 1 OF 4 BOTTLES DRAWN CALLED TO AND READ BACK BY Joyce Trinidad RN AT 3175 ON 1.1.20 RB    Culture result: (A) 12/31/2019 03:24 PM     PRELIMINARY REPORT OF GRAM NEGATIVE RODS GROWING IN 2ND OF 4 BOTTLES DRAWN (DIFFERENT SITE=L WRIST) CALLED TO AND READ BACK BY Joyce Trinidad RN AT 15:51 ON 1/1/20 BY [facility].     Culture result: REMAINING BOTTLE(S) HAS/HAVE NO GROWTH SO FAR 12/31/2019 03:24 PM     Recent Results (from the past 24 hour(s))   GLUCOSE, POC    Collection Time: 01/04/20 12:02 PM   Result Value Ref Range    Glucose (POC) 117 (H) 65 - 100 mg/dL    Performed by [facility], POC    Collection Time: 01/04/20  4:35 PM   Result Value Ref Range    Glucose (POC) 114 (H) 65 - 100 mg/dL    Performed by [employer]    GLUCOSE, POC    Collection Time: 01/04/20  9:46 PM   Result Value Ref Range    Glucose (POC) 131 (H) 65 - 100 mg/dL    Performed by [address], COMPREHENSIVE    Collection Time: 01/05/20  4:29 AM   Result Value Ref Range    Sodium 130 (L) 136 - 145 mmol/L    Potassium 4.6 3.5 - 5.1 mmol/L    Chloride 99 97 - 108 mmol/L    CO2 26 21 - 32 mmol/L    Anion gap 5 5 - 15 mmol/L    Glucose 102 (H) 65 - 100 mg/dL    BUN 21 (H) 6 - 20 MG/DL    Creatinine 1.09 0.70 - 1.30 MG/DL    BUN/Creatinine ratio 19 12 - 20      GFR est AA >60 >60 ml/min/1.73m2    GFR est non-AA >60 >60 ml/min/1.73m2    Calcium 9.0 8.5 - 10.1 MG/DL    Bilirubin, total 0.7 0.2 - 1.0 MG/DL    ALT (SGPT) 16 12 - 78 U/L    AST (SGOT) 19 15 - 37 U/L    Alk.  phosphatase 115 45 - 117 U/L    Protein, total 6.6 6.4 - 8.2 g/dL    Albumin 2.4 (L) 3.5 - 5.0 g/dL    Globulin 4.2 (H) 2.0 - 4.0 g/dL    A-G Ratio 0.6 (L) 1.1 - 2.2     MAGNESIUM    Collection Time: 01/05/20  4:29 AM   Result Value Ref Range    Magnesium 1.7 1.6 - 2.4 mg/dL   CBC W/O DIFF    Collection Time: 01/05/20  4:29 AM   Result Value Ref Range    WBC 6.2 4.1 - 11.1 K/uL    RBC 3.10 (L) 4.10 - 5.70 M/uL    HGB 8.9 (L) 12.1 - 17.0 g/dL    HCT 29.3 (L) 36.6 - 50.3 %    MCV 94.5 80.0 - 99.0 FL    MCH 28.7 26.0 - 34.0 PG    MCHC 30.4 30.0 - 36.5 g/dL    RDW 18.1 (H) 11.5 - 14.5 %    PLATELET 017 (L) 954 - 400 K/uL    MPV 9.9 8.9 - 12.9 FL    NRBC 0.0 0  WBC    ABSOLUTE NRBC 0.00 0.00 - 0.01 K/uL   PROTHROMBIN TIME + INR    Collection Time: 01/05/20  4:29 AM   Result Value Ref Range    INR 1.3 (H) 0.9 - 1.1      Prothrombin time 13.1 (H) 9.0 - 11.1 sec   PTT    Collection Time: 01/05/20  4:29 AM   Result Value Ref Range    aPTT 30.7 22.1 - 32.0 sec    aPTT, therapeutic range     58.0 - 77.0 SECS   DIGOXIN    Collection Time: 01/05/20  4:29 AM   Result Value Ref Range    Digoxin level 0.6 (L) 0.90 - 2.00 NG/ML   LACTIC ACID    Collection Time: 01/05/20  4:29 AM   Result Value Ref Range    Lactic acid 0.7 0.4 - 2.0 MMOL/L   PROCALCITONIN    Collection Time: 01/05/20  4:29 AM   Result Value Ref Range    Procalcitonin 0.53 ng/mL   LD    Collection Time: 01/05/20  4:29 AM   Result Value Ref Range     85 - 241 U/L   NT-PRO BNP    Collection Time: 01/05/20  4:29 AM   Result Value Ref Range    NT pro-BNP 4,364 (H) <125 PG/ML   GLUCOSE, POC    Collection Time: 01/05/20  6:59 AM   Result Value Ref Range    Glucose (POC) 98 65 - 100 mg/dL    Performed by Adelaida Espinosa    GLUCOSE, POC    Collection Time: 01/05/20 11:11 AM   Result Value Ref Range    Glucose (POC) 152 (H) 65 - 100 mg/dL    Performed by Gisela Dillon MD

## 2020-01-05 NOTE — PROGRESS NOTES
[facility] in [facility], [facility]  Inpatient Progress Note      Patient name: Princess Kim  Patient : 1950  Patient MRN: [id number]  Attending MD: Pricila Jackson MD  Date of service: 20    Chief Complaint:   Michael Clock azucena LVAD implant     HPI: Sona Kiser is a 71y.o. year old pleasant white male with a history of HTN, HLD, JOSE, CAD s/p cardiac arrest VFib s/p CABG () c/b sternal would infection and sternectomy, ischemic cardiomyopathy LVEF 15-20%, s/p ICD and with LBBB. He was admitted with acute on chronic systolic heart failure with massive volume overload > 20 lbs, in the setting of atrial fibrillation s/p failed DCCV and underwent DCCV and RHC on .  S/p BiVICD on 19 with Dr. Shaquille Singleton. He was discharged home home on IV milrinone on 19. Suzanne Romeo has been followed closely by Dr. Jacques Warner and the [facility].     Mr. Kiser was admitted for acute on chronic systolic heart failure. He underwent implantation of Impella 5.0 due to CS and has completed his LVAD evaluation. Suzanne Romeo meets criteria for implant of HM3 as DT. He was NYHA Class IV prior to implant of Impella 5.0, has LVEF < 15%, was intolerant of GDMT due to symptomatic hypotension and renal dysfunction. He remains dependent on temporary MCS support. RHC  revealed compensated hemodynamics on Impella. His renal function has improved dramatically with improvement in his hemodynamics. He is not a suitable transplant candidate due to single kidney, sternectomy, debility, and frailty. He was reviewed by Evi Nayak and felt to be a high risk heart transplant candidate due to multiple co-morbidities as well as the afore mentioned conditions.  He remained in the CCU and underwent a HM 3 implant as DT on .   He was weaned off of pressors and transferred to [place] [address] where he continues to undergo PT/OT and hemodynamic optimization.       24Hr Events  2/4 Blood cultures positive for pseudomonas - on cefepime  Complains of dizziness - MAP dropped 10 mmHg with sitting    Procedure:  Procedure(s):  REMOVAL TEMPORARY LEFT VENTRICULAR ASSIST DEVICE, IMPLANTATION OF LEFT VENTRICULAR ASSIST DEVICE PERMANENT (VAD), ECC, JACQUE, EPI AORTIC US BY DR Arthur Vinson.     POD:  47     Impression / Plan:   Heart Failure Status: NYHA Class IV    Chronic systolic heart failure due to ICM, NYHA Class IV, EF < 15%, cardiogenic shock bridged with Impella 5.0 support to HM 3 implant   S/p Impella removal and LVAD implant 11/18/19  RPMs 6400 rpm - frequent PI events  TTE with bubble study negative for PFO  Obtain CardioMEMS readings daily- labeled \"suspect\" per [employer] website/unreliable  Holding diuretics  Encourage PO fluids  Increase Sildenafil to 20 mg PO TID  Intolerant of BB due to RV failure  Intolerant of ACEi/ARB/ARNI/AA due to JUAN J on CKD 3  Increase midodrine to 5 mg po BID for orthostatic hypotension  Daily orthostatics  Strict I/O, daily weights  Continue LVAD education   Dressing change frequency three times weekly, Sutures removed 12/26/19  Delayed skin healing on bottom portion of sternotomy- evaluated by CTS, no intervention   TTE 12/16- EF 15-20%    Bacteremia - Pseudomonas  Blood cultures (12/31/19) 2/4 bottles +Pseudomonas & 2/4 bottles GNRs    On IV Cefepime and Levaquin   Follow procalcitonin and lactic acid levels - both improving    Orthostatic Hypotension  Increase midodrine to 5 mg BID    Generalized myoclonus   Improved   Appreciate Neurology input  Decreased Keppra due to somnolence - 125 mg po BID   Head CT negative for acute process  EEG suggestive of mild generalized encephalopathic process, possibly related to underlying structural brain injury vs metabolic abnormality  Monitor closely      Anticoagulation for LVAD, INR goal 1.5-2  Goal decreased d/t persistent hematuria   No ASA d/t hematuria  INR 1.3  Coumadin - 4 mg tonight    Epistaxis  Resolved      Acute Respiratory Failure post op  Improved with aggressive diuresis  Off supplemental O2  Pulmonary hygiene   Cont home CPAP- must use while sleeping   Schedule PET CT prior to discharge    Acute on CKD 3, atrophic left kidney  Appreciate Nephrology assistance  Watch Cr, stable  Hold diuretics for positive orthostatics   Avoid nephrotoxins, trend labs   Renally dose meds      CAD s/p CABG   Off ASA d/t hematuria  No BB d/t RV failure  Hold statin due to recent hepatic failure   LHC performed 11/13 - low likelihood of benefit from revascularization      Hx of VF arrest s/p BiV-ICD  No recent shocks  Keep K > 4 and Mg > 2   Mag ox to 800mg po BID    PAF   Dig level 0.6-cont dig (62.5 mcg qMWF)  No BB due to RV failure   Repeat TFTs WNL     Hx of gout  Uric acid WNL    Acute blood loss anemia  Likely due to hematuria  Cont octreotide 25 mcg SQ TID   Fecal occult 12/14 negative, positive on 12/17  Appreciate urology recommendations  Hematuria improved  Monitor for now     Urinary retention, c/b serratia UTI and hematuria  Appreciate Urology consult   Repeat UA with cx negative 12/15   Cystoscopy performed 11/13 shows catheter induced cystitis   Pseudomonas aeruginosa positive UA culture (12/31/19) - on Cefepmine    Malnutrition   Eating better  Prealbumin weekly - 12.6 on 12/30   Appreciate Nutritionist assistance  Diet as tolerated, encourage food from home, protein shakes  Intolerant of mirtazapine d/t tremors, confusion   D/C Marinol d/t polypharmacy  Cont MVI add thiamine 100mg daily   IR to place [place] for supplemental feeds on Monday    COPD   Appreciate Pulmonology assistance   Continue nebs PRN       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  No additional treatment at this time     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  Etiology unclear  Continue [facility]  Appreciate neurology assistance      Hx of sternal wound infection, sternectomy  Sternum closed post op- monitor     Vocal cord paralysis  Improved  ENT recs appreciated  Neck CT- R neck edema, no airway compromise   Speech therapy following - appreciate input    Anxiety  Klonipin 0.5 mg TID prn anxiety    Depression  Stop lexapro d/t hyponatremia   Continue Effexor XR 75mg daily  Monitor QTc - 478 ms on 12/22   Monitor rhythm strips for prolonged QTc     Debility  Continue PT/OT     Bladder spasms  Received pyridium 100mg x4 doses  Oxybutynin 5mg Q6hr prn      Ppx  Protonix   PT/OT   Bowel regimen   PICC placed 11/22/19      Dispo:  Remain in CVSU at this time  Will need IP rehab. Not ready for discharge at this time        Thank you for allowing us to participate in your patient's care. Mounika Signh MD, [facility], [address] [place] [address] of Cardiology, [address] [place] [address], [address] [place] [address], [address] [place] [address]  Office [phone]  Fax [phone]        LVAD INTERROGATION:  Device interrogated in person  Device function normal, normal flow, multiple PI events    LVAD   Pump Speed (RPM): 6400  Pump Flow (LPM): 5.7  MAP: 85  PI (Pulsitility Index): 3.5  Power: 5.8   Test: Yes  Back Up  at Bedside & Labeled: Yes  Power Module Test: Yes  Driveline Site Care: No  Driveline Dressing: Clean, Dry, and Intact  Outpatient: No  MAP in Therapeutic Range (Outpatient): Yes  Testing  Alarms Reviewed: Yes  Back up SC speed: 6400  Back up Low Speed Limit: 6000  Emergency Equipment with Patient?: Yes  Emergency procedures reviewed?: Yes  Drive line site inspected?: Yes  Drive line intergrity inspected?: Yes  Drive line dressing changed?: No    PHYSICAL EXAM:  Visit Vitals  BP 91/72 (BP 1 Location: Left arm, BP Patient Position: At rest)   Pulse 81   Temp 97.4 °F (36.3 °C)   Resp 16   Ht 6' 2\" (1.88 m)   Wt 167 lb 15.9 oz (76.2 kg)   SpO2 100%   BMI 21.57 kg/m²       Physical Exam   Constitutional: He is oriented to person, place, and time. He appears well-developed. He appears cachectic. He appears ill. No distress. HENT:   Head: Normocephalic.    Neck: Normal range of motion. Neck supple. No JVD present. Cardiovascular: Normal rate, regular rhythm and normal heart sounds. + VAD hum    Pulmonary/Chest: Effort normal and breath sounds normal. No tachypnea. No respiratory distress. Abdominal: Soft. Bowel sounds are normal. He exhibits no distension. Musculoskeletal: Normal range of motion. General: No edema. Neurological: He is alert and oriented to person, place, and time. Skin: Skin is warm and dry. Psychiatric: He has a normal mood and affect. His speech is normal and behavior is normal.   Nursing note and vitals reviewed. REVIEW OF SYSTEMS:  Review of Systems   Constitutional: Positive for malaise/fatigue. Negative for chills and fever. HENT: Positive for hearing loss. Negative for nosebleeds. Eyes: Negative. Respiratory: Negative for cough and shortness of breath. Mild dyspnea   Cardiovascular: Negative for chest pain, palpitations, orthopnea and leg swelling. Orthostatic   Gastrointestinal: Negative for heartburn, nausea and vomiting. Genitourinary: Positive for dysuria. Negative for hematuria. Bladder spasms    Musculoskeletal: Negative. Neurological: Positive for dizziness and weakness. Negative for headaches. Endo/Heme/Allergies: Does not bruise/bleed easily.        PAST MEDICAL HISTORY:  Past Medical History:   Diagnosis Date    Degenerative disc disease, lumbar     Heart failure ([address]     High cholesterol     Hypertension     Paroxysmal atrial fibrillation ([address] 4/2/2019    Spinal stenosis        PAST SURGICAL HISTORY:  Past Surgical History:   Procedure Laterality Date    COLONOSCOPY Left 11/11/2019    COLONOSCOPY at bedside performed by Schuyler Lawton MD at [address] HX APPENDECTOMY      HX CORONARY ARTERY BYPASS GRAFT      triple    HX HERNIA REPAIR      HX IMPLANTABLE CARDIOVERTER DEFIBRILLATOR      ND CARDIOVERSION ELECTIVE ARRHYTHMIA EXTERNAL N/A 6/10/2019    EP CARDIOVERSION performed by Tammie Russell MD at [address], [facility] CATH LAB    [place] CARDIOVERSION ELECTIVE ARRHYTHMIA EXTERNAL N/A 2019    EP CARDIOVERSION performed by Kimani De Los Santos MD at [address], [facility] [address] CATH LAB    [place] INSJ/RPLCMT PERM DFB W/TRNSVNS LDS 1/DUAL CHMBR N/A 2019    INSERT ICD BIV MULTI performed by Say Fowler MD at [address], [facility] [address] CATH LAB    [place] TCAT IMPL WRLS P-ART PRS SNR L-T HEMODYN MNTR N/A 2019    IMPLANT HEART FAILURE MONITORING DEVICE performed by Kaur Pastrana MD at [address], [facility]/s  CATH LAB       FAMILY HISTORY:  Family History   Problem Relation Age of Onset    Lung Disease Mother     Hypertension Mother     Arthritis-osteo Mother     Heart Disease Mother     Heart Disease Father     Diabetes Father        SOCIAL HISTORY:  Social History     Socioeconomic History    Marital status:      Spouse name: Not on file    Number of children: Not on file    Years of education: Not on file    Highest education level: Not on file   Tobacco Use    Smoking status: Former Smoker     Last attempt to quit: 2010     Years since quittin.1    Smokeless tobacco: Never Used   Substance and Sexual Activity    Alcohol use: Not Currently     Comment: rarely    Drug use: Never   Other Topics Concern       LABORATORY RESULTS:     Labs Latest Ref Rng & Units 2020 2020 1/3/2020 2020 2020 2020 2019   WBC 4.1 - 11.1 K/uL 6.2 6.1 5.6 7.5 - 7.6 -   RBC 4.10 - 5.70 M/uL 3.10(L) 3.05(L) 2.97(L) 2.77(L) - 2.79(L) -   Hemoglobin 12.1 - 17.0 g/dL 8.9(L) 8.8(L) 8.5(L) 8.2(L) 8.4(L) 8.2(L) 8.0(L)   Hematocrit 36.6 - 50.3 % 29. 3(L) 28. 5(L) 28. 0(L) 26. 0(L) 26. 9(L) 26. 8(L) 25. 8(L)   MCV 80.0 - 99.0 FL 94.5 93.4 94.3 93.9 - 96.1 -   Platelets 274 - 166 K/uL 127(L) 131(L) 124(L) 123(L) - 133(L) -   Lymphocytes 12 - 49 % - - - - - - -   Monocytes 5 - 13 % - - - - - - -   Eosinophils 0 - 7 % - - - - - - -   Basophils 0 - 1 % - - - - - - -   Albumin 3.5 - 5.0 g/dL 2. 4(L) 2.4(L) 2. 5(L) 2. 6(L) - 2. 8(L) -   Calcium 8.5 - 10.1 MG/DL 9.0 8.3(L) 8.6 8.9 - 8.7 -   SGOT 15 - 37 U/L 19 21 18 20 - 21 -   Glucose 65 - 100 mg/dL 102(H) 105(H) 97 104(H) - 96 -   BUN 6 - 20 MG/DL 21(H) 20 19 21(H) - 21(H) -   Creatinine 0.70 - 1.30 MG/DL 1.09 1.03 1.02 1.15 - 1.35(H) -   Sodium 136 - 145 mmol/L 130(L) 132(L) 131(L) 129(L) - 132(L) -   Potassium 3.5 - 5.1 mmol/L 4.6 4.8 4.6 4.3 - 4.6 -   TSH 0.36 - 3.74 uIU/mL - - - - - - -   LDH 85 - 241 U/L 185 319(H) 166 162 - 167 -   CEA ng/mL - - - - - - -   Some recent data might be hidden     Lab Results   Component Value Date/Time    TSH 2.30 12/03/2019 03:29 AM    TSH 2.40 10/25/2019 07:39 PM    TSH 2.45 06/01/2019 04:16 AM       ALLERGY:  No Known Allergies     CURRENT MEDICATIONS:  Current Facility-Administered Medications   Medication Dose Route Frequency    warfarin (COUMADIN) tablet 4 mg  4 mg Oral ONCE    sildenafil (pulm.hypertension) (REVATIO) tablet 20 mg  20 mg Oral TID    thiamine HCL (B-1) tablet 100 mg  100 mg Oral DAILY    venlafaxine-SR (EFFEXOR-XR) capsule 75 mg  75 mg Oral DAILY WITH BREAKFAST    midodrine (PROAMITINE) tablet 2.5 mg  2.5 mg Oral BID WITH MEALS    levoFLOXacin (LEVAQUIN) 750 mg in D5W IVPB  750 mg IntraVENous Q24H    cefepime (MAXIPIME) 2 g in 0.9% sodium chloride (MBP/ADV) 100 mL  2 g IntraVENous Q8H    oxybutynin (DITROPAN) tablet 5 mg  5 mg Oral Q6H PRN    magnesium oxide (MAG-OX) tablet 800 mg  800 mg Oral BID    [Held by provider] milrinone (PRIMACOR) 20 MG/100 ML D5W infusion  0.125 mcg/kg/min IntraVENous CONTINUOUS    albumin human 5% (BUMINATE) solution 12.5 g  12.5 g IntraVENous DIALYSIS PRN    lidocaine (URO-JET/ GLYDO) 2 % jelly   Urethral PRN    senna-docusate (PERICOLACE) 8.6-50 mg per tablet 1 Tab  1 Tab Oral DAILY    epoetin yvonne-epbx (RETACRIT) injection 20,000 Units  20,000 Units SubCUTAneous Q MON, WED & FRI    levETIRAcetam (KEPPRA) tablet 125 mg  125 mg Oral BID    polyethylene glycol (MIRALAX) packet 17 g  17 g Oral DAILY    albuterol-ipratropium (DUO-NEB) 2.5 MG-0.5 MG/3 ML  3 mL Nebulization Q6H PRN    therapeutic multivitamin (THERAGRAN) tablet 1 Tab  1 Tab Oral DAILY    potassium chloride SR (KLOR-CON 10) tablet 40 mEq  40 mEq Oral DAILY    alteplase (CATHFLO) 1 mg in sterile water (preservative free) 1 mL injection  1 mg InterCATHeter PRN    finasteride (PROSCAR) tablet 5 mg  5 mg Oral DAILY    spironolactone (ALDACTONE) tablet 25 mg  25 mg Oral DAILY    clonazePAM (KlonoPIN) tablet 0.5 mg  0.5 mg Oral TID PRN    diphenhydrAMINE (BENADRYL) capsule 25 mg  25 mg Oral QHS PRN    octreotide (SANDOSTATIN) injection 25 mcg  25 mcg SubCUTAneous TID    benzocaine-menthol (CEPACOL) lozenge 1 Lozenge  1 Lozenge Mucous Membrane PRN    digoxin (LANOXIN) tablet 0.0625 mg  62.5 mcg Oral Q MON, WED & FRI    pantoprazole (PROTONIX) tablet 40 mg  40 mg Oral ACB    allopurinol (ZYLOPRIM) tablet 100 mg  100 mg Oral DAILY    arformoterol (BROVANA) neb solution 15 mcg  15 mcg Nebulization BID RT    And    budesonide (PULMICORT) 500 mcg/2 ml nebulizer suspension  500 mcg Nebulization BID RT    artificial saliva (MOUTH KOTE) 1 Spray  1 Spray Oral PRN    lactobac ac& pc-s.therm-b.anim (TIAN Q/RISAQUAD)  1 Cap Oral DAILY    oxyCODONE IR (ROXICODONE) tablet 5 mg  5 mg Oral Q6H PRN    tamsulosin (FLOMAX) capsule 0.4 mg  0.4 mg Oral DAILY    insulin lispro (HUMALOG) injection   SubCUTAneous AC&HS    Warfarin MD/NP dosing   Other PRN    sodium chloride (NS) flush 5-40 mL  5-40 mL IntraVENous Q8H    sodium chloride (NS) flush 5-40 mL  5-40 mL IntraVENous PRN    acetaminophen (TYLENOL) tablet 650 mg  650 mg Oral Q6H PRN    naloxone (NARCAN) injection 0.4 mg  0.4 mg IntraVENous PRN    ondansetron (ZOFRAN) injection 4 mg  4 mg IntraVENous Q4H PRN    bisacodyl (DULCOLAX) tablet 5 mg  5 mg Oral DAILY PRN    sucralfate (CARAFATE) tablet 1 g  1 g Oral AC&HS    influenza vaccine 2019-20 (6 mos+)(PF) (FLUARIX/FLULAVAL/FLUZONE QUAD) injection 0.5 mL  0.5 mL IntraMUSCular PRIOR TO DISCHARGE    hydrALAZINE (APRESOLINE) 20 mg/mL injection 20 mg  20 mg IntraVENous Q6H PRN    dextrose 10 % infusion 125-250 mL  125-250 mL IntraVENous PRN         Thank you for allowing me to participate in this patient's care.     Prince Nathan MD  [address] [place] [address], [facility]  Phone: [phone]  Fax: [phone]

## 2020-01-05 NOTE — PROGRESS NOTES
0730:  Bedside shift change report given to Danni Daugherty RN (oncoming nurse) by Milton Espinoza RN (offgoing nurse). Report included the following information SBAR, Kardex, Procedure Summary, Intake/Output, MAR, and Recent Results. 0830:  Patient assisted to chair, c/o dizziness and lightheadedness but passed quickly. 1330:  Patient remains in chair, eating lunch from home. 1630:  Patient remained in chair, then switched from power to batteries with little assistance. Patient taken off floor to go outside and to the [address] [place] [address] on monitor and with backup equipment. When patient returned to room was transferred to bed and patient's wife performed switchover from batteries to power. 1930:  Bedside shift change report given to Jasmina Hernandez RN (oncoming nurse) by Danni Daugherty RN (offgoing nurse). Report included the following information SBAR, Kardex, Procedure Summary, Intake/Output, MAR and Recent Results. Problem: Falls - Risk of  Goal: *Absence of Falls  Description  Document Solange De Jesus Fall Risk and appropriate interventions in the flowsheet. Outcome: Progressing Towards Goal  Note: Fall Risk Interventions:  Mobility Interventions: Patient to call before getting OOB    Mentation Interventions: Adequate sleep, hydration, pain control, Bed/chair exit alarm    Medication Interventions: Patient to call before getting OOB, Teach patient to arise slowly, Utilize gait belt for transfers/ambulation    Elimination Interventions: Call light in reach, Patient to call for help with toileting needs    History of Falls Interventions: Consult care management for discharge planning         Problem: Pain  Goal: *Control of Pain  Outcome: Progressing Towards Goal     Problem: Pressure Injury - Risk of  Goal: *Prevention of pressure injury  Description  Document Mich Scale and appropriate interventions in the flowsheet.   Outcome: Progressing Towards Goal  Note: Pressure Injury Interventions:  Sensory Interventions: Assess changes in LOC, Keep linens dry and wrinkle-free, Maintain/enhance activity level    Moisture Interventions: Apply protective barrier, creams and emollients, Maintain skin hydration (lotion/cream)    Activity Interventions: Increase time out of bed, Pressure redistribution bed/mattress(bed type), PT/OT evaluation    Mobility Interventions: HOB 30 degrees or less, Pressure redistribution bed/mattress (bed type)    Nutrition Interventions: Discuss nutritional consult with provider, Document food/fluid/supplement intake, Offer support with meals,snacks and hydration    Friction and Shear Interventions: Apply protective barrier, creams and emollients, Lift sheet                Problem: Heart Failure: Discharge Outcomes  Goal: *Demonstrates ability to perform prescribed activity without shortness of breath or discomfort  Outcome: Progressing Towards Goal     Problem: LVAD Post-op Day 15 to Discharge  Goal: Psychosocial  Outcome: Progressing Towards Goal

## 2020-01-05 NOTE — PROGRESS NOTES
[facility] VAD/Heart Failure Progress Note    Admit Date: 10/25/2019  POD:  41 Days Post-Op    Procedure:  Procedure(s):  LEFT BRACHIAL THROMBECTOMY        Subjective:   Dyspnea, fatigue, and weakness; poor appetite      Objective:   Vitals:  Blood pressure 91/72, pulse 87, temperature 97.7 °F (36.5 °C), resp. rate 18, height 6' 2\" (1.88 m), weight 167 lb 15.9 oz (76.2 kg), SpO2 100 %.   Temp (24hrs), Av °F (36.7 °C), Min:97.4 °F (36.3 °C), Max:98.4 °F (36.9 °C)    Hemodynamics:   CO: CO (l/min): 5.8 l/min   CI: CI (l/min/m2): 2.8 l/min/m2   CVP: CVP (mmHg): 4 mmHg (19 1645)   SVR: SVR (dyne*sec)/cm5: 1080 (dyne*sec)/cm5 (19 9713)   PAP Systolic: PAP Systolic: 34 ( 4999)   PAP Diastolic: PAP Diastolic: 13 ( 1743)   PVR:     SV02: SVO2 (%): 67 % (19 1300)   SCV02: SCVO2 (%): 75 % (10/29/19 1900)    VAD Interrogation: LVAD (Heartmate)  Pump Speed (RPM): 6400  Pump Flow (LPM): 6.3  PI (Pulsitility Index): 1.9  Power: 5.6  MAP: 80   Test: Yes  Back Up  at Bedside & Labeled: Yes  Power Module Test: Yes  Driveline Site Care: No  Driveline Dressing: Clean, Dry, and Intact    EKG/Rhythm:      Extubation Date / Time:     CT Output:     Ventilator:  Ventilator Volumes  Vt Set (ml): 550 ml (19 08)  Vt Exhaled (Machine Breath) (ml): 639 ml (19 08)  Vt Spont (ml): 437 ml (19 1035)  Ve Observed (l/min): 7.25 l/min (19 1035)    Oxygen Therapy:  Oxygen Therapy  O2 Sat (%): 100 % (20 111)  Pulse via Oximetry: 104 beats per minute (20 1013)  O2 Device: Nasal cannula (20)  PEEP/CPAP (cm H2O): 1 cm H20 (20 1013)  O2 Flow Rate (L/min): 1 l/min (20)  FIO2 (%): 21 % (19)    CXR:    Admission Weight: Last Weight   Weight: 192 lb 10.9 oz (87.4 kg) Weight: 167 lb 15.9 oz (76.2 kg)     Intake / Output / Drain:  Current Shift: 701 -  1900  In: 2943 [P.O.:720; I.V.:300]  Out: 1300 [Urine:1300]  Last 24 hrs.:     Intake/Output Summary (Last 24 hours) at 1/5/2020 1409  Last data filed at 1/5/2020 1112  Gross per 24 hour   Intake 1530 ml   Output 2700 ml   Net -1170 ml           No results for input(s): CPK, CKMB, TROIQ in the last 72 hours.   Recent Labs     01/05/20 0429 01/04/20 0617 01/03/20  0351   * 132* 131*   K 4.6 4.8 4.6   CO2 26 25 26   BUN 21* 20 19   CREA 1.09 1.03 1.02   * 105* 97   MG 1.7 1.8 1.9   WBC 6.2 6.1 5.6   HGB 8.9* 8.8* 8.5*   HCT 29.3* 28.5* 28.0*   * 131* 124*     Recent Labs     01/05/20 0429 01/04/20 0617 01/03/20  0351   INR 1.3* 1.4* 1.4*   PTP 13.1* 13.5* 14.0*   APTT 30.7 32.5* 36.1*     No lab exists for component: PBNP        Current Facility-Administered Medications:     warfarin (COUMADIN) tablet 4 mg, 4 mg, Oral, ONCE, Mounika Altman MD    midodrine (PROAMITINE) tablet 5 mg, 5 mg, Oral, BID WITH MEALS, Mounika Altman MD    sildenafil (pulm.hypertension) (REVATIO) tablet 20 mg, 20 mg, Oral, TID, Mounika Altman MD, 20 mg at 01/05/20 0912    thiamine HCL (B-1) tablet 100 mg, 100 mg, Oral, DAILY, Claude Alfonso NP, 100 mg at 01/05/20 0912    venlafaxine-SR (EFFEXOR-XR) capsule 75 mg, 75 mg, Oral, DAILY WITH BREAKFAST, Claude Alfonso NP, 75 mg at 01/05/20 0912    levoFLOXacin (LEVAQUIN) 750 mg in D5W IVPB, 750 mg, IntraVENous, Q24H, Jaylyn Ferguson MD, Last Rate: 100 mL/hr at 01/04/20 1418, 750 mg at 01/04/20 1418    cefepime (MAXIPIME) 2 g in 0.9% sodium chloride (MBP/ADV) 100 mL, 2 g, IntraVENous, Q8H, Juan C Olivares MD, Last Rate: 200 mL/hr at 01/05/20 0907, 2 g at 01/05/20 0907    oxybutynin (DITROPAN) tablet 5 mg, 5 mg, Oral, Q6H PRN, Shana Sims NP, 5 mg at 01/01/20 1204    magnesium oxide (MAG-OX) tablet 800 mg, 800 mg, Oral, BID, Claude ACUNA NP, 800 mg at 01/05/20 0912    [Held by provider] milrinone (PRIMACOR) 20 MG/100 ML D5W infusion, 0.125 mcg/kg/min, IntraVENous, CONTINUOUS, Shana Sims NP, Last Rate: 2.8 mL/hr at 01/02/20 0121, 0.125 mcg/kg/min at 01/02/20 0121    albumin human 5% (BUMINATE) solution 12.5 g, 12.5 g, IntraVENous, DIALYSIS PRN, Logan Kincaid MD    lidocaine (URO-JET/ GLYDO) 2 % jelly, , Urethral, PRN, Mounika Altman MD    senna-docusate (PERICOLACE) 8.6-50 mg per tablet 1 Tab, 1 Tab, Oral, DAILY, Garo Herrera NP, Stopped at 01/04/20 0900    epoetin yvonne-epbx (RETACRIT) injection 20,000 Units, 20,000 Units, SubCUTAneous, Q MON, WED & FRI, Logan Kincaid MD, 20,000 Units at 01/03/20 2101    levETIRAcetam (KEPPRA) tablet 125 mg, 125 mg, Oral, BID, Jamee Herrera NP, 125 mg at 01/05/20 0912    polyethylene glycol (MIRALAX) packet 17 g, 17 g, Oral, DAILY, Garo Herrera NP, Stopped at 01/04/20 0900    albuterol-ipratropium (DUO-NEB) 2.5 MG-0.5 MG/3 ML, 3 mL, Nebulization, Q6H PRN, Dayron Howe MD, 3 mL at 12/25/19 1407    therapeutic multivitamin (THERAGRAN) tablet 1 Tab, 1 Tab, Oral, DAILY, LeviAlexiin B, NP, 1 Tab at 01/05/20 0912    potassium chloride SR (KLOR-CON 10) tablet 40 mEq, 40 mEq, Oral, DAILY, Levi Shana B, NP, 40 mEq at 01/05/20 0912    alteplase (CATHFLO) 1 mg in sterile water (preservative free) 1 mL injection, 1 mg, InterCATHeter, PRN, Mounika Altman MD, 1 mg at 12/31/19 1121    finasteride (PROSCAR) tablet 5 mg, 5 mg, Oral, DAILY, Pablo Ledesma MD, 5 mg at 01/05/20 0912    spironolactone (ALDACTONE) tablet 25 mg, 25 mg, Oral, DAILY, Mounika Altman MD, 25 mg at 01/05/20 0912    clonazePAM (KlonoPIN) tablet 0.5 mg, 0.5 mg, Oral, TID PRN, Mounika Adams MD, 0.5 mg at 01/04/20 1644    diphenhydrAMINE (BENADRYL) capsule 25 mg, 25 mg, Oral, QHS PRN, Rin Herrera NP, 25 mg at 01/04/20 1509    octreotide (SANDOSTATIN) injection 25 mcg, 25 mcg, SubCUTAneous, TID, Garo Herrera NP, 25 mcg at 01/05/20 0907    benzocaine-menthol (CEPACOL) lozenge 1 Lozenge, 1 Lozenge, Mucous Membrane, PRN, Polliard, Bryce Margaret, NP    digoxin (LANOXIN) tablet 0.0625 mg, 62.5 mcg, Oral, Q MON, WED & FRI, Polliard, Bryce Margaret, NP, 0.0625 mg at 01/03/20 2100    pantoprazole (PROTONIX) tablet 40 mg, 40 mg, Oral, ACB, Polliard, Anil Sportsman T, NP, 40 mg at 01/05/20 0700    allopurinol (ZYLOPRIM) tablet 100 mg, 100 mg, Oral, DAILY, Polliard, Anil Sportsman T, NP, 100 mg at 01/05/20 0912    arformoterol (BROVANA) neb solution 15 mcg, 15 mcg, Nebulization, BID RT, 15 mcg at 01/04/20 1012 **AND** budesonide (PULMICORT) 500 mcg/2 ml nebulizer suspension, 500 mcg, Nebulization, BID RT, Polliard, [facility] T, NP, 500 mcg at 01/04/20 1012    artificial saliva (MOUTH KOTE) 1 Spray, 1 Spray, Oral, PRN, Polliard, Bryce Margaret, NP, 1 Spray at 12/12/19 1452    lactobac ac& pc-s.therm-b.anim (TIAN Q/RISAQUAD), 1 Cap, Oral, DAILY, Nicole Gomez MD, 1 Cap at 01/05/20 0912    oxyCODONE IR (ROXICODONE) tablet 5 mg, 5 mg, Oral, Q6H PRN, Danielle Chakraborty MD, 5 mg at 12/30/19 0405    tamsulosin (FLOMAX) capsule 0.4 mg, 0.4 mg, Oral, DAILY, Nick Blair MD, 0.4 mg at 01/05/20 0912    insulin lispro (HUMALOG) injection, , SubCUTAneous, AC&HS, Shana Sims NP, 2 Units at 01/05/20 1125    Warfarin MD/NP dosing, , Other, PRN, Mounika Altman MD    sodium chloride (NS) flush 5-40 mL, 5-40 mL, IntraVENous, Q8H, Harshal Mahmood MD, 10 mL at 01/05/20 0700    sodium chloride (NS) flush 5-40 mL, 5-40 mL, IntraVENous, PRN, Danielle Chakraborty MD, 10 mL at 01/02/20 2342    acetaminophen (TYLENOL) tablet 650 mg, 650 mg, Oral, Q6H PRN, Danielle Chakraborty MD, 650 mg at 01/02/20 2213    naloxone (NARCAN) injection 0.4 mg, 0.4 mg, IntraVENous, PRN, Danielle Chakraborty MD    [facility]etron [facility]) injection 4 mg, 4 mg, IntraVENous, Q4H PRN, Danielle Chakraborty MD, 4 mg at 01/03/20 1748    bisacodyl (DULCOLAX) tablet 5 mg, 5 mg, Oral, DAILY PRN, Danielle Chakraborty MD, 5 mg at 12/22/19 1533    sucralfate (Benna Walter) tablet 1 g, 1 g, Oral, AC&HS, Raul Norris MD, 1 g at 01/05/20 1112    influenza vaccine 2019-20 (6 mos+)(PF) (FLUARIX/FLULAVAL/FLUZONE QUAD) injection 0.5 mL, 0.5 mL, IntraMUSCular, PRIOR TO DISCHARGE, Royal Jennifer Altman MD    hydrALAZINE (APRESOLINE) 20 mg/mL injection 20 mg, 20 mg, IntraVENous, Q6H PRN, Shana Sims NP, 20 mg at 12/10/19 0541    dextrose 10 % infusion 125-250 mL, 125-250 mL, IntraVENous, PRN, Mounika Kamara MD, Stopped at 11/18/19 0700    A/P     S/P LVAD - good flows  Need for anti-coagulation - coumadin  DM - insulin  RV dysfunction - milrinone, diuretics      Risk of morbidity and mortality - high  Medical decision making - high complexity    Signed By: Monica Salgado MD

## 2020-01-06 NOTE — PROGRESS NOTES
0730: Bedside shift change report given to Spoonerrylan Dominguez (oncoming nurse) by Key Rojas (offgoing nurse). Report included the following information SBAR and Kardex. 1930: Bedside shift change report given to Estela Rich RN (oncoming nurse) by Claire Falcon RN (offgoing nurse). Report included the following information SBAR and Kardex. Problem: Falls - Risk of  Goal: *Absence of Falls  Description  Document Kan[place]alucie  Fall Risk and appropriate interventions in the flowsheet. Outcome: Progressing Towards Goal  Note: Fall Risk Interventions:  Mobility Interventions: Communicate number of staff needed for ambulation/transfer, OT consult for ADLs, Patient to call before getting OOB, PT Consult for mobility concerns    Mentation Interventions: Adequate sleep, hydration, pain control, Evaluate medications/consider consulting pharmacy    Medication Interventions: Evaluate medications/consider consulting pharmacy, Patient to call before getting OOB, Teach patient to arise slowly    Elimination Interventions: Call light in reach, Patient to call for help with toileting needs    History of Falls Interventions: Consult care management for discharge planning, Evaluate medications/consider consulting pharmacy, Room close to nurse's station         Problem: Diabetes Self-Management  Goal: *Disease process and treatment process  Description  Define diabetes and identify own type of diabetes; list 3 options for treating diabetes.   Outcome: Progressing Towards Goal     Problem: Heart Failure: Discharge Outcomes  Goal: *Demonstrates ability to perform prescribed activity without shortness of breath or discomfort  Outcome: Progressing Towards Goal

## 2020-01-06 NOTE — PROGRESS NOTES
1940: Report received from Pod Rubens 954: Bedside and Verbal shift change report given to Cristóbal Faria (oncoming nurse) by Salina Osullivan (offgoing nurse). Report included the following information SBAR, Kardex, ED Summary, Procedure Summary, Intake/Output, MAR, Recent Results, Med Rec Status and Cardiac Rhythm NSR.

## 2020-01-06 NOTE — PROGRESS NOTES
[facility]   [address] [facility], [address], [facility]  Phone: [phone]   [phone]       Nephrology Progress Note  Corby David     [birth year]     [id number]  Date of Admission : 10/25/2019  01/06/20    CC: Follow up for JUAN J, CKD, Hyponatremia      Assessment and Plan   JUAN J on CKD:  - Cr stable,  - cont present care    Pseudomonal UTI and bacteremia:  - from cultures on 12/29  - on cefepime per ID    Orthostatic hypotension w/ syncope:  - on midodrine  - may need to hold aldactone as well     Hyponatremia :  - Na stable at 130  - daily Na levels for now    Gross hematuria, BPH w/ retention:  - off CBI pre VAD. 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 CT in place    L arm clot s/p thrombectomy on 11/25    JOSE on CPAP      PAF s/p DCCV 6/19     Anemia:  - from blood loss s/p multiple blood products + IV iron  - cont PAVEL     Interval History:    Seen and examined. Na and Cr stable. Persistent dizziness, but improving .   No cp or sob reported    Review of Systems: as per HPI    Current Medications:   Current Facility-Administered Medications   Medication Dose Route Frequency    warfarin (COUMADIN) tablet 4 mg  4 mg Oral ONCE    midodrine (PROAMITINE) tablet 5 mg  5 mg Oral BID WITH MEALS    hydrocortisone (CORTAID) 1 % cream   Topical TID PRN    sildenafil (pulm.hypertension) (REVATIO) tablet 20 mg  20 mg Oral TID    thiamine HCL (B-1) tablet 100 mg  100 mg Oral DAILY    venlafaxine-SR (EFFEXOR-XR) capsule 75 mg  75 mg Oral DAILY WITH BREAKFAST    levoFLOXacin (LEVAQUIN) 750 mg in D5W IVPB  750 mg IntraVENous Q24H    cefepime (MAXIPIME) 2 g in 0.9% sodium chloride (MBP/ADV) 100 mL  2 g IntraVENous Q8H    oxybutynin (DITROPAN) tablet 5 mg  5 mg Oral Q6H PRN    magnesium oxide (MAG-OX) tablet 800 mg  800 mg Oral BID    albumin human 5% (BUMINATE) solution 12.5 g  12.5 g IntraVENous DIALYSIS PRN    lidocaine (URO-JET/ GLYDO) 2 % jelly   Urethral PRN    senna-docusate (PERICOLACE) 8.6-50 mg per tablet 1 Tab  1 Tab Oral DAILY    epoetin yvonne-epbx (RETACRIT) injection 20,000 Units  20,000 Units SubCUTAneous Q MON, WED & FRI    polyethylene glycol (MIRALAX) packet 17 g  17 g Oral DAILY    albuterol-ipratropium (DUO-NEB) 2.5 MG-0.5 MG/3 ML  3 mL Nebulization Q6H PRN    therapeutic multivitamin (THERAGRAN) tablet 1 Tab  1 Tab Oral DAILY    potassium chloride SR (KLOR-CON 10) tablet 40 mEq  40 mEq Oral DAILY    alteplase (CATHFLO) 1 mg in sterile water (preservative free) 1 mL injection  1 mg InterCATHeter PRN    finasteride (PROSCAR) tablet 5 mg  5 mg Oral DAILY    spironolactone (ALDACTONE) tablet 25 mg  25 mg Oral DAILY    clonazePAM (KlonoPIN) tablet 0.5 mg  0.5 mg Oral TID PRN    diphenhydrAMINE (BENADRYL) capsule 25 mg  25 mg Oral QHS PRN    octreotide (SANDOSTATIN) injection 25 mcg  25 mcg SubCUTAneous TID    benzocaine-menthol (CEPACOL) lozenge 1 Lozenge  1 Lozenge Mucous Membrane PRN    digoxin (LANOXIN) tablet 0.0625 mg  62.5 mcg Oral Q MON, WED & FRI    pantoprazole (PROTONIX) tablet 40 mg  40 mg Oral ACB    allopurinol (ZYLOPRIM) tablet 100 mg  100 mg Oral DAILY    arformoterol (BROVANA) neb solution 15 mcg  15 mcg Nebulization BID RT    And    budesonide (PULMICORT) 500 mcg/2 ml nebulizer suspension  500 mcg Nebulization BID RT    artificial saliva (MOUTH KOTE) 1 Spray  1 Spray Oral PRN    lactobac ac& pc-s.therm-b.anim (TIAN Q/RISAQUAD)  1 Cap Oral DAILY    oxyCODONE IR (ROXICODONE) tablet 5 mg  5 mg Oral Q6H PRN    tamsulosin (FLOMAX) capsule 0.4 mg  0.4 mg Oral DAILY    insulin lispro (HUMALOG) injection   SubCUTAneous AC&HS    Warfarin MD/NP dosing   Other PRN    sodium chloride (NS) flush 5-40 mL  5-40 mL IntraVENous Q8H    sodium chloride (NS) flush 5-40 mL  5-40 mL IntraVENous PRN    acetaminophen (TYLENOL) tablet 650 mg  650 mg Oral Q6H PRN    naloxone (NARCAN) injection 0.4 mg  0.4 mg IntraVENous PRN    ondansetron (ZOFRAN) injection 4 mg  4 mg IntraVENous Q4H PRN    bisacodyl (DULCOLAX) tablet 5 mg  5 mg Oral DAILY PRN    sucralfate (CARAFATE) tablet 1 g  1 g Oral AC&HS    influenza vaccine 2019-20 (6 mos+)(PF) (FLUARIX/FLULAVAL/FLUZONE QUAD) injection 0.5 mL  0.5 mL IntraMUSCular PRIOR TO DISCHARGE    hydrALAZINE (APRESOLINE) 20 mg/mL injection 20 mg  20 mg IntraVENous Q6H PRN    dextrose 10 % infusion 125-250 mL  125-250 mL IntraVENous PRN      No Known Allergies    Objective:  Vitals:    Vitals:    01/06/20 0748 01/06/20 0842 01/06/20 0845 01/06/20 1157   BP:       Pulse: 85   79   Resp: 18   18   Temp: 97.8 °F (36.6 °C)   97.7 °F (36.5 °C)   SpO2: 98%  97% 98%   Weight:  76 kg (167 lb 8.8 oz)     Height:         Intake and Output:  01/06 0701 - 01/06 1900  In: 200 [P.O.:200]  Out: 300 [Urine:300]  01/04 1901 - 01/06 0700  In: 7466 [P.O.:2140; I.V.:650]  Out: 6658 [Urine:3950]    Physical Examination:    General: Elderly man in no acute distress  Neck:  No lines   Resp:  TA  CV:  VAD sounds; No LE edema  GI:  Soft and non-tender; no distension  Neurologic:  Alert and appropriate; normal speech  :  No Lizama    [x]    High complexity decision making was performed  [x]    Patient is at high-risk of decompensation with multiple organ involvement    Lab Data Personally Reviewed: I have reviewed all the pertinent labs, microbiology data and radiology studies during assessment.     Recent Labs     01/06/20  0019 01/05/20  0429 01/04/20  0617   * 130* 132*   K 4.9 4.6 4.8    99 100   CO2 25 26 25   * 102* 105*   BUN 23* 21* 20   CREA 1.11 1.09 1.03   CA 8.9 9.0 8.3*   MG 1. 8 1.7 1.8   ALB 2.5* 2.4* 2.4*   SGOT 18 19 21   ALT 16 16 15   INR 1.3* 1.3* 1.4*     Recent Labs     01/06/20  0019 01/05/20  0429 01/04/20  0617   WBC 6.8 6.2 6.1   HGB 8.5* 8.9* 8.8*   HCT 28.3* 29.3* 28.5*   * 127* 131*     No results found for: SDES  Lab Results   Component Value Date/Time    Culture result: LIGHT NORMAL RESPIRATORY TIAN 12/31/2019 04:18 PM    Culture result: (A) 12/31/2019 03:24 PM     PSEUDOMONAS AERUGINOSA ISOLATED FROM 2 OF 4 BOTTLES DRAWN, EACH FROM A DIFFERENT SITE. .. RFA AND LEFT WRIST    Culture result: (A) 12/31/2019 03:24 PM     PRELIMINARY REPORT OF GRAM NEGATIVE RODS GROWING IN 1 OF 4 BOTTLES DRAWN CALLED TO AND READ BACK BY Annemarie Carlos RN AT 4727 ON 1.1.20 RB    Culture result: (A) 12/31/2019 03:24 PM     PRELIMINARY REPORT OF GRAM NEGATIVE RODS GROWING IN 2ND OF 4 BOTTLES DRAWN (DIFFERENT SITE=L WRIST) CALLED TO AND READ BACK BY Annemarie Carlos RN AT 15:51 ON 1/1/20 BY [facility].     Culture result: REMAINING BOTTLE(S) HAS/HAVE NO GROWTH IN 5 DAYS 12/31/2019 03:24 PM     Recent Results (from the past 24 hour(s))   GLUCOSE, POC    Collection Time: 01/05/20  4:55 PM   Result Value Ref Range    Glucose (POC) 95 65 - 100 mg/dL    Performed by Sam Childers    GLUCOSE, POC    Collection Time: 01/05/20  9:30 PM   Result Value Ref Range    Glucose (POC) 101 (H) 65 - 100 mg/dL    Performed by Martha Monday    METABOLIC PANEL, COMPREHENSIVE    Collection Time: 01/06/20 12:19 AM   Result Value Ref Range    Sodium 131 (L) 136 - 145 mmol/L    Potassium 4.9 3.5 - 5.1 mmol/L    Chloride 100 97 - 108 mmol/L    CO2 25 21 - 32 mmol/L    Anion gap 6 5 - 15 mmol/L    Glucose 104 (H) 65 - 100 mg/dL    BUN 23 (H) 6 - 20 MG/DL    Creatinine 1.11 0.70 - 1.30 MG/DL    BUN/Creatinine ratio 21 (H) 12 - 20      GFR est AA >60 >60 ml/min/1.73m2    GFR est non-AA >60 >60 ml/min/1.73m2    Calcium 8.9 8.5 - 10.1 MG/DL    Bilirubin, total 0.7 0.2 - 1.0 MG/DL    ALT (SGPT) 16 12 - 78 U/L AST (SGOT) 18 15 - 37 U/L    Alk.  phosphatase 118 (H) 45 - 117 U/L    Protein, total 6.7 6.4 - 8.2 g/dL    Albumin 2.5 (L) 3.5 - 5.0 g/dL    Globulin 4.2 (H) 2.0 - 4.0 g/dL    A-G Ratio 0.6 (L) 1.1 - 2.2     MAGNESIUM    Collection Time: 01/06/20 12:19 AM   Result Value Ref Range    Magnesium 1.8 1.6 - 2.4 mg/dL   CBC W/O DIFF    Collection Time: 01/06/20 12:19 AM   Result Value Ref Range    WBC 6.8 4.1 - 11.1 K/uL    RBC 2.98 (L) 4.10 - 5.70 M/uL    HGB 8.5 (L) 12.1 - 17.0 g/dL    HCT 28.3 (L) 36.6 - 50.3 %    MCV 95.0 80.0 - 99.0 FL    MCH 28.5 26.0 - 34.0 PG    MCHC 30.0 30.0 - 36.5 g/dL    RDW 18.3 (H) 11.5 - 14.5 %    PLATELET 256 (L) 780 - 400 K/uL    MPV 10.1 8.9 - 12.9 FL    NRBC 0.0 0  WBC    ABSOLUTE NRBC 0.00 0.00 - 0.01 K/uL   PROTHROMBIN TIME + INR    Collection Time: 01/06/20 12:19 AM   Result Value Ref Range    INR 1.3 (H) 0.9 - 1.1      Prothrombin time 13.4 (H) 9.0 - 11.1 sec   PTT    Collection Time: 01/06/20 12:19 AM   Result Value Ref Range    aPTT 27.3 22.1 - 32.0 sec    aPTT, therapeutic range     58.0 - 77.0 SECS   DIGOXIN    Collection Time: 01/06/20 12:19 AM   Result Value Ref Range    Digoxin level 0.5 (L) 0.90 - 2.00 NG/ML   LACTIC ACID    Collection Time: 01/06/20 12:19 AM   Result Value Ref Range    Lactic acid 0.8 0.4 - 2.0 MMOL/L   PROCALCITONIN    Collection Time: 01/06/20 12:19 AM   Result Value Ref Range    Procalcitonin 0.42 ng/mL   TYPE & SCREEN    Collection Time: 01/06/20 12:19 AM   Result Value Ref Range    Crossmatch Expiration 01/09/2020     ABO/Rh(D) Maxcine Tita POSITIVE     Antibody screen NEG     Comment       Previously identified Nonspecific Antibody and Nonspecific Cold Antibody    ROYER Poly POS     ROYER IgG POS     ROYER C3b/C3d NEG     Unit number [id number]     Blood component type [facility]     Unit division 00     Status of unit ALLOCATED     Crossmatch result Compatible     Unit number [id number]     Blood component type [facility]     Unit division 00     Status of unit ALLOCATED     Crossmatch result Compatible    LD    Collection Time: 01/06/20 12:19 AM   Result Value Ref Range     85 - 241 U/L   NT-PRO BNP    Collection Time: 01/06/20 12:19 AM   Result Value Ref Range    NT pro-BNP 3,193 (H) <125 PG/ML   PREALBUMIN    Collection Time: 01/06/20 12:19 AM   Result Value Ref Range    Prealbumin 13.3 (L) 20.0 - 40.0 mg/dL   GLUCOSE, POC    Collection Time: 01/06/20  7:13 AM   Result Value Ref Range    Glucose (POC) 96 65 - 100 mg/dL    Performed by KIM Banegas    Collection Time: 01/06/20 11:54 AM   Result Value Ref Range    Glucose (POC) 132 (H) 65 - 100 mg/dL    Performed by Юлия Beyer MD

## 2020-01-06 NOTE — DIABETES MGMT
[facility] SPECIALIST CONSULT    Presentation   Prince Carias is a 71 y.o. male admitted with heart failure and consulted by Provider for advanced diabetes nursing assessment and care, specifically related to Complex Case Management . Current clinical course has been complicated by LVAD placement, renal insufficiency, and UTI. Diabetes-related medical history    Acute complications: none    Neurological complications: none    Microvascular disease: none    Macrovascular disease: none    Other associated conditions     Depression    Diabetes medication history  Drug class NA/unknown Never used Discontinued Currently in use   Biguanide  x     DDP-4 inhibitor   x     Sulfonylurea  x     Thiazolidinedione  x     GLP-1 RA  x     SGLT-2 inhibitors  x     Basal insulin  x     Bolus insulin  x       Subjective   Mr Yuri Harry is a 71year old male admitted for heart failure and is s/p HM3 implantation. [facility] for a new A1C of 6.6. He has been on sliding scale insulin. His blood glucose levels have ranged from  and gets 0-2 units of sliding scale Humalog a day. He is being followed by dietary at this time in reference to poor PO intake and maximizing his nutrition in the setting of heart failure. At this time, he is eating 25%-75% of meals with snacks. Social determinants of health impacting diabetes self-management practices   Struggling with anxiety and/or depression      Objective   Physical exam    General  Alert, oriented and in no acute distress/ill-appearing. Conversant and cooperative  Vital Signs   Visit Vitals  BP 91/72 (BP 1 Location: Left arm, BP Patient Position: At rest)   Pulse 79   Temp 97.7 °F (36.5 °C)   Resp 18   Ht 6' 2\" (1.88 m)   Wt 76 kg (167 lb 8.8 oz)   SpO2 98%   BMI 21.51 kg/m²   . Orthostatic BP measurement not indicated    Skin  Warm and dry. No Acanthosis noted along neckline.      Neck   Thyroid smooth and non-tender  Heart   Regular rate and rhythm. No murmurs, rubs or gallops  Lungs  Clear without rales or rhonchi  Extremities Diabetic foot exam:    Left Foot     Visual Exam: normal    Pulse DP: absent- LVAD   Filament test: normal sensation    Vibratory sensation: normal  Right Foot   Visual Exam: normal    Pulse DP: absent- LVAD   Filament test: normal sensation    Vibratory sensation: normal DP & PT pulses +2. Laboratory  Lab Results   Component Value Date/Time    Hemoglobin A1c 6.6 (H) 10/25/2019 02:56 PM     Lab Results   Component Value Date/Time    LDL, calculated 56.2 10/26/2019 04:09 AM     Lab Results   Component Value Date/Time    Creatinine 1.11 01/06/2020 12:19 AM     Lab Results   Component Value Date/Time    Sodium 131 (L) 01/06/2020 12:19 AM    Potassium 4.9 01/06/2020 12:19 AM    Chloride 100 01/06/2020 12:19 AM    CO2 25 01/06/2020 12:19 AM    Anion gap 6 01/06/2020 12:19 AM    Glucose 104 (H) 01/06/2020 12:19 AM    BUN 23 (H) 01/06/2020 12:19 AM    Creatinine 1.11 01/06/2020 12:19 AM    BUN/Creatinine ratio 21 (H) 01/06/2020 12:19 AM    GFR est AA >60 01/06/2020 12:19 AM    GFR est non-AA >60 01/06/2020 12:19 AM    Calcium 8.9 01/06/2020 12:19 AM    Bilirubin, total 0.7 01/06/2020 12:19 AM    AST (SGOT) 18 01/06/2020 12:19 AM    Alk.  phosphatase 118 (H) 01/06/2020 12:19 AM    Protein, total 6.7 01/06/2020 12:19 AM    Albumin 2.5 (L) 01/06/2020 12:19 AM    Globulin 4.2 (H) 01/06/2020 12:19 AM    A-G Ratio 0.6 (L) 01/06/2020 12:19 AM    ALT (SGPT) 16 01/06/2020 12:19 AM     Lab Results   Component Value Date/Time    ALT (SGPT) 16 01/06/2020 12:19 AM       Blood glucose pattern    Metric Breakfast Lunch Dinner Bedtime   BG       Basal dose       Bolus       Correction         Assessment and Plan   Nursing Diagnosis Risk for unstable blood glucose pattern   Nursing Intervention Domain 7827 Decision-making Support   Nursing Interventions Examined current inpatient diabetes control   Explored factors facilitating and impeding inpatient management  Identified self-management practices impeding diabetes control  Explored corrective strategies with patient and responsible inpatient provider   Informed patient of rational for basal bolus insulin strategy while hospitalized     Evaluation   Patient has required minimal dosing of Humalog a day. Would recommend d/c sliding scale as his blood glucose would remain stable with current dietary plan. If his nutrition needs or diet changes and you see a rise in his blood glucose over 200, you can start basal lantus (0.2 units/kg) daily with mealtime and correctional coverage. Reach out if you do start this as his mealtime coverage would depend on his nutritional intake at that time. Please reach out upon discharge to determine if there are any blood glucose management needs at that time.     B  Billing Code(s)   [id number]    Odalis Terrell RN  Access via [facility]

## 2020-01-06 NOTE — PROGRESS NOTES
Chart reviewed and attempted to see patient for OT intervention. Staff member in patient's room at this time evaluation defibrillator. Will follow up for OT intervention as able. Thank you.

## 2020-01-06 NOTE — PROGRESS NOTES
Physical Therapy    Reviewed chart, attempted to treat pt. Pt is having defibrillator evaluated at this time and is unavailable. Will defer at this time and continue to follow.

## 2020-01-06 NOTE — PROGRESS NOTES
[facility] VAD/Heart Failure Progress Note    Admit Date: 10/25/2019  POD:  42 Days Post-Op    Procedure:  Procedure(s):  LEFT BRACHIAL THROMBECTOMY        Subjective:   Dyspnea, fatigue, and weakness; up on LVAD speed today      Objective:   Vitals:  Blood pressure 91/72, pulse 79, temperature 97.7 °F (36.5 °C), resp. rate 18, height 6' 2\" (1.88 m), weight 167 lb 8.8 oz (76 kg), SpO2 98 %.   Temp (24hrs), Av.8 °F (36.6 °C), Min:97.7 °F (36.5 °C), Max:97.8 °F (36.6 °C)    Hemodynamics:   CO: CO (l/min): 5.8 l/min   CI: CI (l/min/m2): 2.8 l/min/m2   CVP: CVP (mmHg): 4 mmHg (19 1645)   SVR: SVR (dyne*sec)/cm5: 1080 (dyne*sec)/cm5 (19 9198)   PAP Systolic: PAP Systolic: 34 (37 1725)   PAP Diastolic: PAP Diastolic: 13 (18 1659)   PVR:     SV02: SVO2 (%): 67 % (19 1300)   SCV02: SCVO2 (%): 75 % (10/29/19 1900)    VAD Interrogation: LVAD (Heartmate)  Pump Speed (RPM): 6400  Pump Flow (LPM): 5.5  PI (Pulsitility Index): 3.1  Power: 5.5  MAP: 72   Test: No  Back Up  at Bedside & Labeled: Yes  Power Module Test: No  Driveline Site Care: No  Driveline Dressing: Clean, Dry, and Intact    EKG/Rhythm:      Extubation Date / Time:     CT Output:     Ventilator:  Ventilator Volumes  Vt Set (ml): 550 ml (19 08)  Vt Exhaled (Machine Breath) (ml): 639 ml (19 08)  Vt Spont (ml): 437 ml (19 1035)  Ve Observed (l/min): 7.25 l/min (19 1035)    Oxygen Therapy:  Oxygen Therapy  O2 Sat (%): 98 % (20 1157)  Pulse via Oximetry: 85 beats per minute (20 0845)  O2 Device: Room air (20 1157)  PEEP/CPAP (cm H2O): 1 cm H20 (20 1013)  O2 Flow Rate (L/min): 1 l/min (20 0400)  FIO2 (%): 21 % (19 0823)    CXR:    Admission Weight: Last Weight   Weight: 192 lb 10.9 oz (87.4 kg) Weight: 167 lb 8.8 oz (76 kg)     Intake / Output / Drain:  Current Shift:  07 -  1900  In: 200 [P.O.:200]  Out: 300 [Urine:300]  Last 24 hrs.:     Intake/Output Summary (Last 24 hours) at 1/6/2020 1409  Last data filed at 1/6/2020 1157  Gross per 24 hour   Intake 1850 ml   Output 1850 ml   Net 0 ml           No results for input(s): CPK, CKMB, TROIQ in the last 72 hours.   Recent Labs     01/06/20  0019 01/05/20  0429 01/04/20 0617   * 130* 132*   K 4.9 4.6 4.8   CO2 25 26 25   BUN 23* 21* 20   CREA 1.11 1.09 1.03   * 102* 105*   MG 1.8 1.7 1.8   WBC 6.8 6.2 6.1   HGB 8.5* 8.9* 8.8*   HCT 28.3* 29.3* 28.5*   * 127* 131*     Recent Labs     01/06/20  0019 01/05/20 0429 01/04/20 0617   INR 1.3* 1.3* 1.4*   PTP 13.4* 13.1* 13.5*   APTT 27.3 30.7 32.5*     No lab exists for component: PBNP        Current Facility-Administered Medications:     warfarin (COUMADIN) tablet 4 mg, 4 mg, Oral, ONCE, Sarah Herrera NP    midodrine (PROAMITINE) tablet 5 mg, 5 mg, Oral, BID WITH MEALS, Mounika Altman MD, 5 mg at 01/06/20 0900    hydrocortisone (CORTAID) 1 % cream, , Topical, TID PRN, Jasmina Collins MD    sildenafil (pulm.hypertension) (REVATIO) tablet 20 mg, 20 mg, Oral, TID, Jasmina Collins MD, Stopped at 01/06/20 0901    thiamine HCL (B-1) tablet 100 mg, 100 mg, Oral, DAILY, Tom ACUNA NP, 100 mg at 01/06/20 0900    venlafaxine-SR (EFFEXOR-XR) capsule 75 mg, 75 mg, Oral, DAILY WITH BREAKFAST, Tom ACUNA NP, 75 mg at 01/06/20 0900    levoFLOXacin (LEVAQUIN) 750 mg in D5W IVPB, 750 mg, IntraVENous, Q24H, Royal Live MD, Last Rate: 100 mL/hr at 01/05/20 1430, 750 mg at 01/05/20 1430    cefepime (MAXIPIME) 2 g in 0.9% sodium chloride (MBP/ADV) 100 mL, 2 g, IntraVENous, Q8H, Vipin Olivares MD, Last Rate: 200 mL/hr at 01/06/20 0700, 2 g at 01/06/20 0700    oxybutynin (DITROPAN) tablet 5 mg, 5 mg, Oral, Q6H PRN, Shana Sims NP, 5 mg at 01/01/20 1204    magnesium oxide (MAG-OX) tablet 800 mg, 800 mg, Oral, BID, Tom ACUNA NP, 800 mg at 01/06/20 0901   albumin human 5% (BUMINATE) solution 12.5 g, 12.5 g, IntraVENous, DIALYSIS PRN, Logan Kincaid MD    lidocaine (URO-JET/ GLYDO) 2 % jelly, , Urethral, PRN, Emilee Altman MD    senna-docusate (PERICOLACE) 8.6-50 mg per tablet 1 Tab, 1 Tab, Oral, DAILY, Polliard, Bina Palmer NP, Stopped at 01/04/20 0900    epoetin yvonne-epbx (RETACRIT) injection 20,000 Units, 20,000 Units, SubCUTAneous, Q MON, WED & FRI, Logan Kincaid MD, 20,000 Units at 01/03/20 2101    polyethylene glycol (MIRALAX) packet 17 g, 17 g, Oral, DAILY, Polliard, Bina Palmer NP, Stopped at 01/04/20 0900    albuterol-ipratropium (DUO-NEB) 2.5 MG-0.5 MG/3 ML, 3 mL, Nebulization, Q6H PRN, Patricia Schaffer MD, 3 mL at 12/25/19 1407    therapeutic multivitamin (THERAGRAN) tablet 1 Tab, 1 Tab, Oral, DAILY, Shana Sims NP, 1 Tab at 01/06/20 0900    potassium chloride SR (KLOR-CON 10) tablet 40 mEq, 40 mEq, Oral, DAILY, Shana Sims, NP, 40 mEq at 01/06/20 0900    alteplase (CATHFLO) 1 mg in sterile water (preservative free) 1 mL injection, 1 mg, InterCATHeter, PRN, Mounika Cunningham MD, 1 mg at 12/31/19 1121    finasteride (PROSCAR) tablet 5 mg, 5 mg, Oral, DAILY, Winnie Washington MD, 5 mg at 01/06/20 0900    spironolactone (ALDACTONE) tablet 25 mg, 25 mg, Oral, DAILY, Mounika Altman MD, 25 mg at 01/06/20 0900    clonazePAM (KlonoPIN) tablet 0.5 mg, 0.5 mg, Oral, TID PRN, Mounika Cunningham MD, 0.5 mg at 01/05/20 1613    diphenhydrAMINE (BENADRYL) capsule 25 mg, 25 mg, Oral, QHS PRN, Deja Herrera NP, 25 mg at 01/05/20 2325    octreotide (SANDOSTATIN) injection 25 mcg, 25 mcg, SubCUTAneous, TID, Deja Herrera NP, 25 mcg at 01/06/20 0900    benzocaine-menthol (CEPACOL) lozenge 1 Lozenge, 1 Lozenge, Mucous Membrane, PRN, Bina Herrera NP    digoxin (LANOXIN) tablet 0.0625 mg, 62.5 mcg, Oral, Q MON, WED & FRI, Rin Herrera NP, 0.0625 mg at 01/03/20 2100    pantoprazole (PROTONIX) tablet 40 mg, 40 mg, Oral, ACB, Adrianna Herrera, NP, 40 mg at 01/06/20 0656    allopurinol (ZYLOPRIM) tablet 100 mg, 100 mg, Oral, DAILY, Adrianna Herrera NP, 100 mg at 01/06/20 0902    arformoterol (BROVANA) neb solution 15 mcg, 15 mcg, Nebulization, BID RT, 15 mcg at 01/06/20 0840 **AND** budesonide (PULMICORT) 500 mcg/2 ml nebulizer suspension, 500 mcg, Nebulization, BID RT, Adrianna Herrera, NP, 500 mcg at 01/06/20 0840    artificial saliva (MOUTH KOTE) 1 Spray, 1 Spray, Oral, PRN, Sharon, Marta Jimenez, NP, 1 Spray at 12/12/19 1452    lactobac ac& pc-s.therm-b.anim (TIAN Q/RISAQUAD), 1 Cap, Oral, DAILY, Raul Vivas MD, 1 Cap at 01/06/20 0900    oxyCODONE IR (ROXICODONE) tablet 5 mg, 5 mg, Oral, Q6H PRN, Leanne Burton MD, 5 mg at 12/30/19 0405    tamsulosin (FLOMAX) capsule 0.4 mg, 0.4 mg, Oral, DAILY, Isha Yap MD, 0.4 mg at 01/06/20 0900    insulin lispro (HUMALOG) injection, , SubCUTAneous, AC&HS, Shana Sims NP, Stopped at 01/05/20 1630    Warfarin MD/NP dosing, , Other, PRN, Mounika Altman MD    sodium chloride (NS) flush 5-40 mL, 5-40 mL, IntraVENous, Q8H, Harshal Velasco MD, 10 mL at 01/06/20 0658    sodium chloride (NS) flush 5-40 mL, 5-40 mL, IntraVENous, PRN, Leanne Burton MD, 10 mL at 01/02/20 2342    acetaminophen (TYLENOL) tablet 650 mg, 650 mg, Oral, Q6H PRN, Leanne Burton MD, 650 mg at 01/02/20 2213    naloxone (NARCAN) injection 0.4 mg, 0.4 mg, IntraVENous, PRN, Leanne Burton MD    ondansetron [facility]) injection 4 mg, 4 mg, IntraVENous, Q4H PRN, Leanne Burton MD, 4 mg at 01/03/20 1748    bisacodyl (DULCOLAX) tablet 5 mg, 5 mg, Oral, DAILY PRN, Leanne Burton MD, 5 mg at 12/22/19 1533    sucralfate (CARAFATE) tablet 1 g, 1 g, Oral, AC&HS, Leanne Burton MD, 1 g at 01/06/20 1130    influenza vaccine 2019-20 (6 mos+)(PF) (FLUARIX/FLULAVAL/FLUZONE QUAD) injection 0.5 mL, 0.5 mL, IntraMUSCular, PRIOR TO DISCHARGE, Black Altman MD Ulyses Hock hydrALAZINE (APRESOLINE) 20 mg/mL injection 20 mg, 20 mg, IntraVENous, Q6H PRN, Shana Sims NP, 20 mg at 12/10/19 0541    dextrose 10 % infusion 125-250 mL, 125-250 mL, IntraVENous, PRN, Mounika Hall MD, Stopped at 11/18/19 0700    A/P     S/P LVAD - good flows  Need for anti-coagulation - coumadin  DM - insulin  RV dysfunction - milrinone, diuretics      Risk of morbidity and mortality - high  Medical decision making - high complexity    Signed By: Roxy Goodpasture, MD

## 2020-01-06 NOTE — PROGRESS NOTES
NUTRITION COMPLETE ASSESSMENT      Interventions:    - CALORIE COUNT X 3 DAYS completed- see below(1/3/20 through 1/5/20)  - Glucerna (chocolate) TID - pt to drink between meals  - Cafeteria menu to be provided  - reviewed patient menu for food preferences- adjusted  - snacks adjusted (cubed cheese and grapes)  Family also planning on bringing foods from home. RECOMMENDATIONS:   1. Continue Regular diet- encourage PO intakes. Adjusted ONS/snacks and breakfast meals. - add 100mg thiamine daily; add daily MVI    2. Continue to encourage oral intake - family may bring foods from home     Please document  Severe Acute on Chronic Protein Calorie Malnutrition in patient diagnoses. Patient meets criteria for as evidenced by:   ASPEN Malnutrition Criteria  Acute Illness, Chronic Illness, or Social/Enviornmental: Acute illness  Energy Intake: <75% est energy req for > 7 days  Weight Loss: Greater than 7.5% x 3 mos  Body Fat Loss: Moderate  Muscle Mass Loss: Moderate  Fluid Accumulation: Moderate  ASPEN Malnutrition Score - Acute Illness: 19  Acute Illness - Malnutrition Diagnosis: Severe malnutrition.       Interventions/Plan:   Food/Nutrient Delivery:  (-) Commercial supplement(see below)   (d/c marinol) Initiate enteral nutrition    Assessment:   Reason for Assessment: Reassessment    Diet: regular + snack  Supplements: Glucerna 3x/day   Nutritionally Significant Medications: [x] Reviewed & Includes: insulin, FloraQ, MagOx, Carafate, Flomax, MultiVit, Coumadin, Protonix,     Meal intake: Patient Vitals for the past 168 hrs:   % Diet Eaten   01/06/20 1157 75 %   01/06/20 0748 50 %   01/05/20 1746 50 %   01/05/20 1112 75 %   01/05/20 0739 50 %   01/04/20 1147 75 %   01/04/20 0800 50 %   01/03/20 1530 40 %   01/03/20 1130 100 %   01/03/20 0753 75 %   01/02/20 1505 50 %   01/02/20 1058 75 %   01/02/20 0741 25 %   01/01/20 1211 25 %   01/01/20 0809 50 %   12/31/19 1504 50 %   12/31/19 1308 50 %   12/31/19 0745 25 % 12/30/19 1338 50 %     Subjective: Pt and spouse in room. Discussed weekend PO intakes, progress, snacks/ONS adjustments. Pt eating ~50-75% of meals most of the time. Pt drinking Ensure Enlive 1-2 per day. Pt is getting tired of Ensure and wants to switch to Glucerna for variety. Took pt order for breakfast. Spouse to bring in additional food for lunch today. Took dinner order and discussed meal times- delayed meals to be a little later so pt has more time between meals. Objective:  Pt admitted for CHF. PMHx: HTN, CKD, chronic systolic heart failure, depression, others noted. S/p removal of impella (placed 10/29) and LVAD placement on 11/18. Bacteremia with likely urinary source noted, abx rx. Lethargic today. Milrinone drip off, midodrine rx. Low sodium and chloride continue but stable. Renal following for JUAN J on CKD. Over the weekend pt did better with PO intakes after RD discussed urgent need to increase PO otherwise DHT would be strongly advise to assist in recovery. Pt did improve PO, unsure how long increased PO will last. Pt states he does best with breakfast food. See calorie count below. Meeting >75% of needs at this time. Weekly PAB being checked but not indicator of nutrition status with pro-inflammatory condition of CHF. Do not recommend checking as a way to evaluate nutrition. Does however meet criteria for severe malnutrition with: (1) Severe wt loss over past 6 months (2) Severe muscle/fat wasting (3) Inadequate oral intake.        Day 1 - 1/3/2020  Meal Calories Protein Comments   Breakfast 646 33    Lunch 675 49    Dinner 630 29    Snacks/Supplements      Total 1951 111       95%  Kcal needs met 100%  Protein needs met      Day 2 - 1/4/2020  Meal Calories Protein Comments   Breakfast 400 22    Lunch 790 26    Dinner 80 0    Snacks/Supplements 920 50    Total 2190 98       100%  Kcal needs met 100%  Protein needs met      Day 3 - 1/5/2020  Meal Calories Protein Comments   Breakfast 720 Vika Haywood 894 6    Snacks/Supplements 220 10    Total 1815 82       89%  Kcal needs met  100%  Protein needs met          Estimated Nutrition Needs:   Kcals/day: 2045 Kcals/day(1887-2045kcal)  Protein: 75 g(75-90g (1-1.2g/kg - CKD))  Fluid: 1887 ml(1ml/kcal)  Based On: [place](x 1.2-1.3)  Weight Used: Actual wt(74.7kg)    Pt expected to meet estimated nutrient needs:  [x]   Yes []  No [] Unable to predict at this time  Nutrition Diagnosis:   1. Malnutrition related to CHF vs depression vs malignancy as evidenced by >10% weight loss x 6 months; severe muscle/fat wasting; consuming >70% needs orally  - continues  2. Inadequate oral intake related to poor appetite as evidenced by less than 50% most meals consumed; only 1-2 supplements/day  - improving  3.  Swallowing difficulty(resolved) related to weakness with moderate pharyngeal dysphagia as evidenced by regular with thin liquids  - resolved  Goals:     Initiation of EN to meet 75% needs in 48hrs     Monitoring & Evaluation:    - Total energy intake, Liquid meal replacement, Enteral/parenteral nutrition intake   - Weight/weight change     Previous Nutrition Goals Met:   Progressing  Previous Recommendations:    Yes    Education & Discharge Needs:   [x] None Identified   [] Identified and addressed    [x] Participated in care plan, discharge planning, and/or interdisciplinary rounds        Cultural, Denominational and ethnic food preferences identified: None    Skin Integrity: []Intact  [x]Other: sternal wound  Edema: []None [x]Other: trace  Last BM: 1/4  Food Allergies: [x]None []Other  Diet Restrictions: Cultural/Advent Preference(s): None     Anthropometrics:    Weight Loss Metrics 1/6/2020 10/25/2019 10/25/2019 10/25/2019 9/30/2019 9/18/2019 9/16/2019   Today's Wt 167 lb 8.8 oz - 193 lb 6.4 oz - 199 lb 14.4 oz 196 lb 199 lb   BMI - 21.51 kg/m2 - 24.83 kg/m2 25.67 kg/m2 25.16 kg/m2 25.55 kg/m2      Weight Source: Standing scale (comment)  Height: 6' 2\" (188 cm),    Body mass index is 21.51 kg/m².      IBW : 86.2 kg (190 lb), % IBW (Calculated): 96.32 %   ,      Labs:    Lab Results   Component Value Date/Time    Sodium 131 (L) 01/06/2020 12:19 AM    Potassium 4.9 01/06/2020 12:19 AM    Chloride 100 01/06/2020 12:19 AM    CO2 25 01/06/2020 12:19 AM    Glucose 104 (H) 01/06/2020 12:19 AM    BUN 23 (H) 01/06/2020 12:19 AM    Creatinine 1.11 01/06/2020 12:19 AM    Calcium 8.9 01/06/2020 12:19 AM    Magnesium 1.8 01/06/2020 12:19 AM    Phosphorus 3.3 11/27/2019 04:18 AM    Albumin 2.5 (L) 01/06/2020 12:19 AM     Lab Results   Component Value Date/Time    Hemoglobin A1c 6.6 (H) 10/25/2019 02:56 PM       Mitchel Salvador, [id number] [facility]

## 2020-01-06 NOTE — PROGRESS NOTES
Patient ID:  Patient: Aaron Martinez  MRN: [id number]  Age: 71 y.o.  : 1950  Gender: male    Device interrogation:  The SJM model Quadra Assura MP [id number] implanted 2019 was interrogated revealing adequate battery, sensing, capture thresholds and lead impedances. RA 0.9, 0.875, 390  RV 11.9, 3.125, 380, 50  LV -, 1.0 (D1-M2), 600    Programmed to DDD, there is <1% AP and 69% . SyncAV algorithm is enabled. Mode switch episodes were noted, with an AT/AF burden 44% of the time. No tachycardia episodes were noted. Impression:  Diaphragmatic stimulation reported in the LV M3-P4 configuration. Recommendations and Programming changes:  A programming change was made--changed LV to D1-M2 configuration. No stimulation observed.     Signed By: Gurvinder Ruvalcaba MD     2020

## 2020-01-06 NOTE — PROGRESS NOTES
[facility] in [place], [place]  Inpatient Progress Note      Patient name: Billy Ramírez  Patient : 1950  Patient MRN: [id number]  Attending MD: Pete Alas MD  Date of service: 20    Chief Complaint:   Louvenia Rinne azucena LVAD implant     HPI: Sona Kiser is a 71y.o. year old pleasant white male with a history of HTN, HLD, JOSE, CAD s/p cardiac arrest VFib s/p CABG () c/b sternal would infection and sternectomy, ischemic cardiomyopathy LVEF 15-20%, s/p ICD and with LBBB. He was admitted with acute on chronic systolic heart failure with massive volume overload > 20 lbs, in the setting of atrial fibrillation s/p failed DCCV and underwent DCCV and RHC on .  S/p BiVICD on 19 with Dr. Santy Owens. He was discharged home home on IV milrinone on 19. Arslan Esteban has been followed closely by Dr. Rachele Nageotte and the [facility] [place].     Mr. Kiser was admitted for acute on chronic systolic heart failure. He underwent implantation of Impella 5.0 due to CS and has completed his LVAD evaluation. Arslan Esteban meets criteria for implant of HM3 as DT. He was NYHA Class IV prior to implant of Impella 5.0, has LVEF < 15%, was intolerant of GDMT due to symptomatic hypotension and renal dysfunction. He remains dependent on temporary MCS support. RHC  revealed compensated hemodynamics on Impella. His renal function has improved dramatically with improvement in his hemodynamics. He is not a suitable transplant candidate due to single kidney, sternectomy, debility, and frailty. He was reviewed by Lorenzo Brasher and felt to be a high risk heart transplant candidate due to multiple co-morbidities as well as the afore mentioned conditions.  He remained in the CCU and underwent a HM 3 implant as DT on .   He was weaned off of pressors and transferred to [place] [address] where he continues to undergo PT/OT and hemodynamic optimization.       24Hr Events:   Remains dizzy with ambulation   MAP marginal this AM NT-proBNP decreased     Procedure:  Procedure(s):  REMOVAL TEMPORARY LEFT VENTRICULAR ASSIST DEVICE, IMPLANTATION OF LEFT VENTRICULAR ASSIST DEVICE PERMANENT (VAD), ECC, JACQUE, EPI AORTIC US BY DR Wyatt Bowie.     POD:  48     Impression / Plan:   Heart Failure Status: NYHA Class IV, improved to NYHA Class III    Chronic systolic heart failure due to ICM, NYHA Class IV, EF < 15%, cardiogenic shock bridged with Impella 5.0 support to HM 3 implant   S/p Impella removal and LVAD implant 11/18/19  RPMs increased to 6600 rpms under echo guidance this AM   Re-attempt CardioMEMs reading today   Holding diuretics  Encourage PO fluids  Continue Sildenafil 20 mg PO TID  Intolerant of BB due to RV failure  Intolerant of ACEi/ARB/ARNI/AA due to JUAN J on CKD 3  Continue midodrine 5 mg po BID for orthostatic hypotension  Daily orthostatics  Strict I/O, daily weights  Continue LVAD education   Dressing change frequency three times weekly, Sutures removed 12/26/19  Delayed skin healing on bottom portion of sternotomy- evaluated by CTS, no intervention   TTE 12/16- EF 15-20%    Bacteremia - Pseudomonas  Blood cultures (12/31/19) 2/4 bottles +Pseudomonas & 2/4 bottles GNRs    On IV Cefepime and Levaquin   Follow procalcitonin and lactic acid levels - both improving    Orthostatic Hypotension  Midodrine 5 mg BID    Generalized myoclonus   Improved   Appreciate Neurology input  Discontinue Keppra due to dizziness   Head CT negative for acute process  EEG suggestive of mild generalized encephalopathic process, possibly related to underlying structural brain injury vs metabolic abnormality  Monitor closely      Anticoagulation for LVAD, INR goal 1.5-2  Goal decreased d/t persistent hematuria   No ASA d/t hematuria  INR 1.3  Coumadin - 4 mg tonight    Epistaxis  Resolved      Acute Respiratory Failure post op  Improved with aggressive diuresis  Off supplemental O2  Pulmonary hygiene   Cont home CPAP- must use while sleeping   Schedule PET CT prior to discharge    Acute on CKD 3, atrophic left kidney  Appreciate Nephrology assistance  Watch Cr, stable  Hold diuretics for positive orthostatics   Avoid nephrotoxins, trend labs      CAD s/p CABG   Off ASA d/t hematuria  No BB d/t RV failure  Hold statin due to recent hepatic failure   LHC performed 11/13 - low likelihood of benefit from revascularization      Hx of VF arrest s/p BiV-ICD  No recent shocks  Keep K > 4 and Mg > 2   Mag ox 800 mg po BID  [employer] Don[employer] rep to interrogate device d/t suspected pectoral stimulation via PM  - Dr. Lizette Alvarado aware     PAF   Dig level 0.5-cont dig (62.5 mcg qMWF)  No BB due to RV failure   Repeat TFTs WNL     Hx of gout  Uric acid WNL    Acute blood loss anemia  Likely due to hematuria  Cont octreotide 25 mcg SQ TID   Fecal occult 12/14 negative, positive on 12/17  Appreciate urology recommendations  Hematuria improved  Monitor for now     Urinary retention, c/b serratia UTI and hematuria  Appreciate Urology consult   Repeat UA with cx negative 12/15   Cystoscopy performed 11/13 shows catheter induced cystitis   Pseudomonas aeruginosa positive UA culture (12/31/19) - on Cefepmine    Malnutrition   Eating better  Prealbumin weekly - 13.3 on 1/5  Appreciate Nutritionist assistance  Diet as tolerated, encourage food from home, protein shakes  Intolerant of mirtazapine d/t tremors, confusion   D/C Marinol d/t polypharmacy  Cont MVI add thiamine 100mg daily   Hold on DHT placement for now     COPD   Appreciate Pulmonology assistance   Continue nebs PRN       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  No additional treatment at this time     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  Etiology unclear  Continue [facility]  Appreciate neurology assistance      Hx of sternal wound infection, sternectomy  Sternum closed post op- monitor     Vocal cord paralysis  Improved  ENT recs appreciated  Neck CT- R neck edema, no airway compromise   Speech therapy following - appreciate input    Anxiety  Klonipin 0.5 mg TID prn anxiety    Depression  Stop lexapro d/t hyponatremia   Continue Effexor XR 75mg daily  Monitor QTc - 478 ms on 12/22   Monitor rhythm strips for prolonged QTc     Debility  Continue PT/OT     Bladder spasms  Received pyridium 100mg x4 doses  Oxybutynin 5mg Q6hr prn      Ppx  Protonix   PT/OT   Bowel regimen   PICC placed 11/22/19      Dispo:  Remain in CVSU at this time  Will need IP rehab. Not ready for discharge at this time          Patient seen and examined. Data and note reviewed. I have discussed and agree with the plans as noted. 71year old male with a history of ICM s/p LVAD as Dt whose post op course has been complicated by RV failure, orthostatic hypotension, urinary retention with pseudomonas UTI and bacteremia. Bedside ramp echo reveals persistent LV cavity dilation at 6400 rpm - improved with increasing LVAD speed to 6600 rpm. Will monitor closely for worsening RV failure. Continue PT/OT/LVAD education. He would benefit from inpatient rehabilitation. Thank you for allowing us to participate in your patient's care. Mounika Watson MD, [facility]  Chief of Cardiology, [address] [place] [address], [address] [place] [address], [address] [place] [address]  Office [phone]  Fax [phone]          LVAD INTERROGATION:  Device interrogated in person  Device function normal, normal flow, multiple PI events    LVAD   Pump Speed (RPM): 6400  Pump Flow (LPM): 5.5  MAP: 90  PI (Pulsitility Index): 3.1  Power: 5.5   Test: No  Back Up  at Bedside & Labeled: Yes  Power Module Test: No  Driveline Site Care: No  Driveline Dressing: Clean, Dry, and Intact  Outpatient: No  MAP in Therapeutic Range (Outpatient): Yes  Testing  Alarms Reviewed: Yes  Back up SC speed: 6400  Back up Low Speed Limit: 6000  Emergency Equipment with Patient?: Yes  Emergency procedures reviewed?: Yes  Drive line site inspected?: Yes  Drive line intergrity inspected?: Yes  Drive line dressing changed?: No    PHYSICAL EXAM:  Visit Vitals  BP 91/72 (BP 1 Location: Left arm, BP Patient Position: At rest)   Pulse 79   Temp 97.7 °F (36.5 °C)   Resp 18   Ht 6' 2\" (1.88 m)   Wt 167 lb 8.8 oz (76 kg)   SpO2 98%   BMI 21.51 kg/m²       Physical Exam   Constitutional: He is oriented to person, place, and time. He appears well-developed. He appears cachectic. No distress. HENT:   Head: Normocephalic. Neck: Normal range of motion. Neck supple. No JVD present. Cardiovascular: Normal rate, regular rhythm and normal heart sounds. + VAD hum    Pulmonary/Chest: Effort normal and breath sounds normal. No tachypnea. No respiratory distress. Abdominal: Soft. Bowel sounds are normal. He exhibits no distension. Musculoskeletal: Normal range of motion. General: No edema. Neurological: He is alert and oriented to person, place, and time. Skin: Skin is warm and dry. Psychiatric: He has a normal mood and affect. His speech is normal and behavior is normal.   Nursing note and vitals reviewed. REVIEW OF SYSTEMS:  Review of Systems   Constitutional: Positive for malaise/fatigue. Negative for chills and fever. HENT: Positive for hearing loss. Negative for nosebleeds. Eyes: Negative. Respiratory: Negative for cough and shortness of breath. Mild dyspnea   Cardiovascular: Negative for chest pain, palpitations, orthopnea and leg swelling. Orthostatic   Gastrointestinal: Negative for heartburn, nausea and vomiting. Genitourinary: Positive for dysuria. Negative for hematuria. Bladder spasms    Musculoskeletal: Negative. Neurological: Positive for dizziness and weakness. Negative for headaches. Endo/Heme/Allergies: Does not bruise/bleed easily.        PAST MEDICAL HISTORY:  Past Medical History:   Diagnosis Date    Degenerative disc disease, lumbar     Heart failure ([address]     High cholesterol     Hypertension     [facility]oxysmal [facility] fibrillation [address] 2019    Spinal stenosis        PAST SURGICAL HISTORY:  Past Surgical History:   Procedure Laterality Date    COLONOSCOPY Left 2019    COLONOSCOPY at bedside performed by [facility]aker [facility] at [facility] ENDOSCOPY    HX APPENDECTOMY      HX CORONARY ARTERY BYPASS GRAFT      triple    HX HERNIA REPAIR      HX IMPLANTABLE CARDIOVERTER DEFIBRILLATOR      TN CARDIOVERSION ELECTIVE ARRHYTHMIA EXTERNAL N/A 6/10/2019    EP CARDIOVERSION performed by Jennifer Fisher MD at [address] CATH LAB    TN CARDIOVERSION ELECTIVE ARRHYTHMIA EXTERNAL N/A 2019    [facility] [facility]    TN INSJ/RPLCMT PERM DFB W/TRNSVNS LDS 1/DUAL CHMBR N/A 2019    INSERT ICD BIV MULTI [facility] [facility]    TN TCAT IMPL WRLS P-ART PRS SNR L-T HEMODYN MNTR N/A 2019    IMPLANT HEART FAILURE MONITORING DEVICE performed by Liv Piedra MD at [address] [facility] [address] CATH LAB       FAMILY HISTORY:  Family History   Problem Relation Age of Onset    Lung Disease Mother     Hypertension Mother     Arthritis-osteo Mother     Heart Disease Mother     Heart Disease Father     Diabetes Father        SOCIAL HISTORY:  Social History     Socioeconomic History    Marital status:      Spouse name: Not on file    Number of children: Not on file    Years of education: Not on file    Highest education level: Not on file   Tobacco Use    Smoking status: Former Smoker     Last attempt to quit: 2010     Years since quittin.1    Smokeless tobacco: Never Used   Substance and Sexual Activity    Alcohol use: Not Currently     Comment: rarely    Drug use: Never   Other Topics Concern       LABORATORY RESULTS:     Labs Latest Ref Rng & Units 2020 2020 2020 1/3/2020 2020 2020 2020   WBC 4.1 - 11.1 K/uL 6.8 6.2 6.1 5.6 7.5 - 7.6   RBC 4.10 - 5.70 M/uL 2.98(L) 3.10(L) 3.05(L) 2.97(L) 2.77(L) - 2.79(L)   Hemoglobin 12.1 - 17.0 g/dL 8.5(L) 8. 9(L) 8.8(L) 8.5(L) 8.2(L) 8.4(L) 8.2(L)   Hematocrit 36.6 - 50.3 % 28. 3(L) 29. 3(L) 28. 5(L) 28. 0(L) 26. 0(L) 26. 9(L) 26. 8(L)   MCV 80.0 - 99.0 FL 95.0 94.5 93.4 94.3 93.9 - 96.1   Platelets 674 - 093 K/uL 123(L) 127(L) 131(L) 124(L) 123(L) - 133(L)   Lymphocytes 12 - 49 % - - - - - - -   Monocytes 5 - 13 % - - - - - - -   Eosinophils 0 - 7 % - - - - - - -   Basophils 0 - 1 % - - - - - - -   Albumin 3.5 - 5.0 g/dL 2. 5(L) 2. 4(L) 2. 4(L) 2. 5(L) 2. 6(L) - 2. 8(L)   Calcium 8.5 - 10.1 MG/DL 8.9 9.0 8.3(L) 8.6 8.9 - 8.7   SGOT 15 - 37 U/L 18 19 21 18 20 - 21   Glucose 65 - 100 mg/dL 104(H) 102(H) 105(H) 97 104(H) - 96   BUN 6 - 20 MG/DL 23(H) 21(H) 20 19 21(H) - 21(H)   Creatinine 0.70 - 1.30 MG/DL 1.11 1.09 1.03 1.02 1.15 - 1.35(H)   Sodium 136 - 145 mmol/L 131(L) 130(L) 132(L) 131(L) 129(L) - 132(L)   Potassium 3.5 - 5.1 mmol/L 4.9 4.6 4.8 4.6 4.3 - 4.6   TSH 0.36 - 3.74 uIU/mL - - - - - - -   LDH 85 - 241 U/L 186 185 319(H) 166 162 - 167   CEA ng/mL - - - - - - -   Some recent data might be hidden     Lab Results   Component Value Date/Time    TSH 2.30 12/03/2019 03:29 AM    TSH 2.40 10/25/2019 07:39 PM    TSH 2.45 06/01/2019 04:16 AM       ALLERGY:  No Known Allergies     CURRENT MEDICATIONS:  Current Facility-Administered Medications   Medication Dose Route Frequency    warfarin (COUMADIN) tablet 4 mg  4 mg Oral ONCE    midodrine (PROAMITINE) tablet 5 mg  5 mg Oral BID WITH MEALS    hydrocortisone (CORTAID) 1 % cream   Topical TID PRN    sildenafil (pulm.hypertension) (REVATIO) tablet 20 mg  20 mg Oral TID    thiamine HCL (B-1) tablet 100 mg  100 mg Oral DAILY    venlafaxine-SR (EFFEXOR-XR) capsule 75 mg  75 mg Oral DAILY WITH BREAKFAST    levoFLOXacin (LEVAQUIN) 750 mg in D5W IVPB  750 mg IntraVENous Q24H    cefepime (MAXIPIME) 2 g in 0.9% sodium chloride (MBP/ADV) 100 mL  2 g IntraVENous Q8H    oxybutynin (DITROPAN) tablet 5 mg  5 mg Oral Q6H PRN    magnesium oxide (MAG-OX) tablet 800 mg  800 mg Oral BID    albumin human 5% (BUMINATE) solution 12.5 g  12.5 g IntraVENous DIALYSIS PRN    lidocaine (URO-JET/ GLYDO) 2 % jelly   Urethral PRN    senna-docusate (PERICOLACE) 8.6-50 mg per tablet 1 Tab  1 Tab Oral DAILY    epoetin yvonne-epbx (RETACRIT) injection 20,000 Units  20,000 Units SubCUTAneous Q MON, WED & FRI    polyethylene glycol (MIRALAX) packet 17 g  17 g Oral DAILY    albuterol-ipratropium (DUO-NEB) 2.5 MG-0.5 MG/3 ML  3 mL Nebulization Q6H PRN    therapeutic multivitamin (THERAGRAN) tablet 1 Tab  1 Tab Oral DAILY    potassium chloride SR (KLOR-CON 10) tablet 40 mEq  40 mEq Oral DAILY    alteplase (CATHFLO) 1 mg in sterile water (preservative free) 1 mL injection  1 mg InterCATHeter PRN    finasteride (PROSCAR) tablet 5 mg  5 mg Oral DAILY    spironolactone (ALDACTONE) tablet 25 mg  25 mg Oral DAILY    clonazePAM (KlonoPIN) tablet 0.5 mg  0.5 mg Oral TID PRN    diphenhydrAMINE (BENADRYL) capsule 25 mg  25 mg Oral QHS PRN    octreotide (SANDOSTATIN) injection 25 mcg  25 mcg SubCUTAneous TID    benzocaine-menthol (CEPACOL) lozenge 1 Lozenge  1 Lozenge Mucous Membrane PRN    digoxin (LANOXIN) tablet 0.0625 mg  62.5 mcg Oral Q MON, WED & FRI    pantoprazole (PROTONIX) tablet 40 mg  40 mg Oral ACB    allopurinol (ZYLOPRIM) tablet 100 mg  100 mg Oral DAILY    arformoterol (BROVANA) neb solution 15 mcg  15 mcg Nebulization BID RT    And    budesonide (PULMICORT) 500 mcg/2 ml nebulizer suspension  500 mcg Nebulization BID RT    artificial saliva (MOUTH KOTE) 1 Spray  1 Spray Oral PRN    lactobac ac& pc-s.therm-b.anim (TIAN Q/RISAQUAD)  1 Cap Oral DAILY    oxyCODONE IR (ROXICODONE) tablet 5 mg  5 mg Oral Q6H PRN    tamsulosin (FLOMAX) capsule 0.4 mg  0.4 mg Oral DAILY    insulin lispro (HUMALOG) injection   SubCUTAneous AC&HS    Warfarin MD/NP dosing   Other PRN    sodium chloride (NS) flush 5-40 mL  5-40 mL IntraVENous Q8H    sodium chloride (NS) flush 5-40 mL  5-40 mL IntraVENous PRN    acetaminophen (TYLENOL) tablet 650 mg  650 mg Oral Q6H PRN    naloxone (NARCAN) injection 0.4 mg  0.4 mg IntraVENous PRN    ondansetron (ZOFRAN) injection 4 mg  4 mg IntraVENous Q4H PRN    bisacodyl (DULCOLAX) tablet 5 mg  5 mg Oral DAILY PRN    sucralfate (CARAFATE) tablet 1 g  1 g Oral AC&HS    influenza vaccine 2019-20 (6 mos+)(PF) (FLUARIX/FLULAVAL/FLUZONE QUAD) injection 0.5 mL  0.5 mL IntraMUSCular PRIOR TO DISCHARGE    hydrALAZINE (APRESOLINE) 20 mg/mL injection 20 mg  20 mg IntraVENous Q6H PRN    dextrose 10 % infusion 125-250 mL  125-250 mL IntraVENous PRN         Thank you for allowing me to participate in this patient's care.     Tri Lockhart NP  [address] [place] [address], [facility]  Phone: [phone]  Fax: [phone]

## 2020-01-07 NOTE — PROGRESS NOTES
0730: Bedside shift change report given to Alex Dominguez (oncoming nurse) by Roman Salcedo RN (offgoing nurse). Report included the following information SBAR and Kardex. Problem: Falls - Risk of  Goal: *Absence of Falls  Description  Document Donato Disla Fall Risk and appropriate interventions in the flowsheet. Outcome: Progressing Towards Goal  Note: Fall Risk Interventions:  Mobility Interventions: Communicate number of staff needed for ambulation/transfer    Mentation Interventions: Door open when patient unattended    Medication Interventions: Evaluate medications/consider consulting pharmacy, Patient to call before getting OOB    Elimination Interventions: Call light in reach    History of Falls Interventions: Consult care management for discharge planning, Evaluate medications/consider consulting pharmacy, Room close to nurse's station         Problem: Pressure Injury - Risk of  Goal: *Prevention of pressure injury  Description  Document Mich Scale and appropriate interventions in the flowsheet.   Outcome: Progressing Towards Goal  Note: Pressure Injury Interventions:  Sensory Interventions: Assess changes in LOC, Keep linens dry and wrinkle-free, Minimize linen layers    Moisture Interventions: Check for incontinence Q2 hours and as needed, Minimize layers    Activity Interventions: Increase time out of bed, Pressure redistribution bed/mattress(bed type), PT/OT evaluation    Mobility Interventions: HOB 30 degrees or less, Pressure redistribution bed/mattress (bed type), PT/OT evaluation    Nutrition Interventions: Document food/fluid/supplement intake    Friction and Shear Interventions: Lift sheet, Minimize layers                Problem: Heart Failure: Discharge Outcomes  Goal: *Demonstrates ability to perform prescribed activity without shortness of breath or discomfort  Outcome: Progressing Towards Goal

## 2020-01-07 NOTE — PROGRESS NOTES
Problem: Self Care Deficits Care Plan (Adult)  Goal: *Acute Goals and Plan of Care (Insert Text)  Description    FUNCTIONAL STATUS PRIOR TO ADMISSION: Patient was modified independent using a single point cane for functional mobility. Patient able to shower and dress himself. However, patient required assistance for household management from his wife. HOME SUPPORT: The patient lived alone with wife to provide assistance. Occupational Therapy Goals:    Goals reviewed and upgraded as follows 1/3/2020  1. Patient will perform upper body dressing moderate assistance within 7 day(s) including management of LVAD. -goal met 1/3/2020; upgrade to MIN A  2. Patient will perform lower body dressing with minimal assistance within 7 day(s). -goal met; upgrade to [address] consistently  3. Patient will perform grooming seated EOB with supervision/setup within 7 day(s). -goal met; upgrade to standing with CGA   4. Patient will perform toilet transfers with minimum assistance within 7 day(s). -CONTINUE  5. Patient will perform all aspects of toileting with moderate assistance within 7 day(s). -CONTINUE    Goals reviewed and continued/modified as follows 12/26/2019  1. Patient will perform upper body dressing moderate assistance within 7 day(s) including management of LVAD. 2.  Patient will perform lower body dressing with minimal assistance within 7 day(s). (able to [place] socks in bed, needs MOD A for dynamic standing balance)  3. Patient will perform grooming seated EOB with supervision/setup within 7 day(s). 4.  Patient will perform toilet transfers with minimum assistance within 7 day(s). -CONTINUE  5. Patient will perform all aspects of toileting with moderate assistance within 7 day(s). -CONTINUE  6. Patient will participate in upper extremity therapeutic exercise/activities with supervision/set-up-min A for 5 minutes within 7 day(s). -GOAL MET (discontinue)  7.   Patient will utilize energy conservation techniques during functional activities with verbal cues within 7 day(s). 8. Patient will verbalize 3/5 LVAD components with min verbal cues within 7 day (s). -CONTINUE    Goals reviewed and continued 12/19/2019  OT weekly reassessment 12/6/19: goals modified  1. Patient will perform upper body dressing moderate assistance within 7 day(s). 2.  Patient will perform lower body dressing with moderate assistance within 7 day(s). 3.  Patient will perform grooming seated EOB with minimum assistance within 7 day(s). 4.  Patient will perform toilet transfers with minimum assistance within 7 day(s). 5.  Patient will perform all aspects of toileting with moderate assistance within 7 day(s). 6.  Patient will participate in upper extremity therapeutic exercise/activities with supervision/set-up-min A for 5 minutes within 7 day(s). 7.  Patient will utilize energy conservation techniques during functional activities with verbal cues within 7 day(s). 8. Patient will verbalize 3/5 LVAD components with min verbal cues within 7 day (s). OT weekly reassessment 11/29/19: goals modified below  1. Patient will perform upper body dressing including LVAD switchovers with moderate assistance within 7 day(s). 2.  Patient will perform lower body dressing with minimal assistance within 7 day(s). 3.  Patient will perform grooming in standing with minimum assistance within 7 day(s). 4.  Patient will perform toilet transfers with minimum assistance within 7 day(s). 5.  Patient will perform all aspects of toileting with minimal assistance within 7 day(s). 6.  Patient will participate in upper extremity therapeutic exercise/activities with supervision/set-up for 5 minutes within 7 day(s). 7.  Patient will utilize energy conservation techniques during functional activities with verbal cues within 7 day(s). 8. Patient will verbalize LVAD terminology with verbal cues within 7 day (s).      Goals reviewed and continued/modified as follows 11/20/19 s/p LVAD implantation  1. Patient will perform upper body dressing including LVAD switchovers with moderate assistance within 7 day(s). 2.  Patient will perform lower body dressing with minimal assistance within 7 day(s). 3.  Patient will perform grooming with supervision/setup within 7 day(s). 4.  Patient will perform toilet transfers supervision/setupmodified independence within 7 day(s). 5.  Patient will perform all aspects of toileting with minimal assistance within 7 day(s). 6.  Patient will participate in upper extremity therapeutic exercise/activities with supervision/set-up for 5 minutes within 7 day(s). 7.  Patient will utilize energy conservation techniques during functional activities with verbal cues within 7 day(s). 8. Patient will verbalize LVAD terminology with verbal cues within 7 day (s). Goals reviewed and continued/modified as follows 11/12/19  1. Patient will perform bathing with supervision/set-up within 7 day(s). 2.  Patient will perform lower body dressing with supervision/set-up within 7 day(s). 3.  Patient will perform grooming with modified independence within 7 day(s). 4.  Patient will perform toilet transfers with modified independence within 7 day(s). 5.  Patient will perform all aspects of toileting with supervision/set-up within 7 day(s). 6.  Patient will participate in upper extremity therapeutic exercise/activities with supervision/set-up for 5 minutes within 7 day(s). 7.  Patient will utilize energy conservation techniques during functional activities with verbal cues within 7 day(s). 8. Patient will verbalize LVAD terminology with verbal cues within 7 day (s) in preparation for implant. Continue all goals 10/30/19 post re-eval for Impella removal   Initiated 10/28/2019  1. Patient will perform bathing with supervision/set-up within 7 day(s). 2.  Patient will perform lower body dressing with supervision/set-up within 7 day(s).   3. Patient will perform grooming with modified independence within 7 day(s). 4.  Patient will perform toilet transfers with modified independence within 7 day(s). 5.  Patient will perform all aspects of toileting with supervision/set-up within 7 day(s). 6.  Patient will participate in upper extremity therapeutic exercise/activities with supervision/set-up for 5 minutes within 7 day(s). 7.  Patient will utilize energy conservation techniques during functional activities with verbal cues within 7 day(s). Outcome: Progressing Towards Goal   OCCUPATIONAL THERAPY TREATMENT  Patient: Fahad Gibbs (75 y.o. male)  Date: 1/7/2020  Diagnosis: Acute decompensated heart failure ([facility] [address] [I50.9]   <principal problem not specified>  Procedure(s) (LRB):  LEFT BRACHIAL THROMBECTOMY (Left) 43 Days Post-Op  Precautions: Fall, Sternal(LVAD )  Chart, occupational therapy assessment, plan of care, and goals were reviewed. ASSESSMENT  Patient continues with skilled OT services and is progressing towards goals. Patient continues to require cues for \"move in the tube\" and presents with generalized weakness, BUE/BLE tremors, impaired standing balance, decreased endurance, and decreased activity tolerance however with no c/o dizziness in session and able to tolerate standing at the sink for grooming tasks with MONI-MODA for standing balance. Patient also utilized walker for functional mobility <> bathroom x MIN A x 2 with cues for safety and patient continent of bowel on commode (BSC frame). Patient continues to require MAX A for pericare due to balance. Patient also requires MOD A for LVAD switchover from PM > batteries. Continue to recommend IPR post discharge to maximize patient safety and independence with ADL transfers and tasks.      Patient is verbalizing and is demonstrating understanding of mindful-based movements (\"move in the tube\") principles of keeping UEs proximal to ribcage to prevent lateral pull on the sternum during load-bearing activities with verbal and tactile cues required for compliance. Current Level of Function Impacting Discharge (ADLs): MAX A toileting     Other factors to consider for discharge: LVAD HM III; medical complexity         PLAN :  Patient continues to benefit from skilled intervention to address the above impairments. Continue treatment per established plan of care. to address goals. Recommend with staff: OOB x 3 to chair; BUE cardiac exercises    Recommend next OT session: dynamic standing balance for pericare    Recommendation for discharge: (in order for the patient to meet his/her long term goals)  Therapy 3 hours per day 5-7 days per week    This discharge recommendation:  Has been made in collaboration with the attending provider and/or case management    IF patient discharges home will need the following DME: to be determined (TBD)       SUBJECTIVE:   Patient stated I feel better today.     OBJECTIVE DATA SUMMARY:   Cognitive/Behavioral Status:  Neurologic State: Alert  Orientation Level: Oriented X4  Cognition: Appropriate for age attention/concentration  Perception: Appears intact  Perseveration: No perseveration noted  Safety/Judgement: Decreased awareness of need for safety;Decreased insight into deficits    Functional Mobility and Transfers for ADLs:    Transfers:  Sit to Stand: Minimum assistance;Assist x2; Additional time; Adaptive equipment  Functional Transfers  Toilet Transfer : Moderate assistance;Assist x2; Additional time; Adaptive equipment       Balance:  Sitting: Intact; With support  Standing: Impaired; Without support  Standing - Static: Fair;Constant support  Standing - Dynamic : Poor;Constant support    ADL Intervention:       Grooming  Washing Hands: Minimum assistance(standing at the sink)  Brushing Teeth: Minimum assistance; Moderate assistance(standing at the sink)  (standing tolerance about 5 minutes at this time prior to requiring a rest break) Upper Body Dressing Assistance  Dressing Assistance: Moderate assistance(for LVAD management)         Toileting  Toileting Assistance: Maximum assistance(A for pericare)    Cognitive Retraining  Safety/Judgement: Decreased awareness of need for safety;Decreased insight into deficits    Patient instructed and educated on mindful movement principles based on Move in The Tube concept to include maintaining bilateral elbows close to rib cage when performing any load-bearing activity such as getting in/out of bed, pushing up from a chair, opening a door, or lifting a box. Patient instructed and encouraged to mobilize bilateral upper extremities outside the tube when doing any non-load bearing activity such as washing hair/body, brushing teeth, retrieving clothing items, or scratching your back. Increase activity tolerance for home, work, and sexual intercourse by pacing self with increasing the arm exercises, sitting duration, frequency OOB, walking, standing, and ADLs. Instructed and indicated understanding of s/s of too much activity, how to respond to s/s safely. Activity Tolerance:   Fair and requires rest breaks  Please refer to the flowsheet for vital signs taken during this treatment.     After treatment patient left in no apparent distress:   Sitting in chair and Call bell within reach    COMMUNICATION/COLLABORATION:   The patients plan of care was discussed with: Physical Therapist and Registered Nurse    Beth Shipley  Time Calculation: 38 mins

## 2020-01-07 NOTE — PROGRESS NOTES
[facility] in [place], [place]  Inpatient Progress Note      Patient name: Martha Gallagher  Patient : 1950  Patient MRN: [id number]  Attending MD: Yue Dalal MD  Date of service: 20    Chief Complaint:   Rosemary Billow azucena LVAD implant     HPI: Sona Kiser is a 71y.o. year old pleasant white male with a history of HTN, HLD, JOSE, CAD s/p cardiac arrest VFib s/p CABG () c/b sternal would infection and sternectomy, ischemic cardiomyopathy LVEF 15-20%, s/p ICD and with LBBB. He was admitted with acute on chronic systolic heart failure with massive volume overload > 20 lbs, in the setting of atrial fibrillation s/p failed DCCV and underwent DCCV and RHC on .  S/p BiVICD on 19 with Dr. Yariel Hanks. He was discharged home home on IV milrinone on 19. Billy Rosas has been followed closely by Dr. Kelsey Mays and the [facility].     Mr. Kiser was admitted for acute on chronic systolic heart failure. He underwent implantation of Impella 5.0 due to CS and has completed his LVAD evaluation. Billy Rosas meets criteria for implant of HM3 as DT. He was NYHA Class IV prior to implant of Impella 5.0, has LVEF < 15%, was intolerant of GDMT due to symptomatic hypotension and renal dysfunction. He remains dependent on temporary MCS support. RHC  revealed compensated hemodynamics on Impella. His renal function has improved dramatically with improvement in his hemodynamics. He is not a suitable transplant candidate due to single kidney, sternectomy, debility, and frailty. He was reviewed by [address] [place] [address] and felt to be a high risk heart transplant candidate due to multiple co-morbidities as well as the afore mentioned conditions.  He remained in the CCU and underwent a HM 3 implant as DT on .   He was weaned off of pressors and transferred to [place] [address] where he continues to undergo PT/OT and hemodynamic optimization.       24Hr Events:   RPMs increased to 6600 under echo guidance   Slight improvement in dizziness   MAPs stable   NT-proBNP decreased     Procedure:  Procedure(s):  REMOVAL TEMPORARY LEFT VENTRICULAR ASSIST DEVICE, IMPLANTATION OF LEFT VENTRICULAR ASSIST DEVICE PERMANENT (VAD), ECC, JACQUE, EPI AORTIC US BY DR Kary Greene.     POD:  49     Impression / Plan:   Heart Failure Status: NYHA Class IV, improved to NYHA Class III    Chronic systolic heart failure due to ICM, NYHA Class IV, EF < 15%, cardiogenic shock bridged with Impella 5.0 support to HM 3 implant   S/p Impella removal and LVAD implant 11/18/19  RPMs increased to 6600 rpms under echo guidance this AM   Re-attempt CardioMEMs reading with [employer] rep at bedside   500 mL 5% albumin today   Continue Sildenafil 20 mg PO TID  Intolerant of BB due to RV failure  Intolerant of ACEi/ARB/ARNI/AA due to JUAN J on CKD 3  Discontinue spironolactone d/t IVVD   Increase midodrine to 5 mg po TID for orthostatic hypotension  Strict I/O, daily weights  Continue LVAD education   Dressing change frequency three times weekly, Sutures removed 12/26/19  Delayed skin healing on bottom portion of sternotomy- evaluated by CTS - re-consult WOCN     Bacteremia - Pseudomonas  Blood cultures (12/31/19) 2/4 bottles +Pseudomonas & 2/4 bottles GNRs    Repeat BC   On IV Cefepime and Levaquin   Follow procalcitonin and lactic acid levels - both improving    Orthostatic Hypotension  Increase midodrine to 5 mg PO TID    Generalized myoclonus   Improved   Appreciate Neurology input  Discontinued Keppra due to dizziness   Head CT negative for acute process  EEG suggestive of mild generalized encephalopathic process, possibly related to underlying structural brain injury vs metabolic abnormality  Monitor closely      Anticoagulation for LVAD, INR goal 1.5-2  Goal decreased d/t persistent hematuria   No ASA d/t hematuria  INR 1.4  Coumadin - 5 mg tonight    Epistaxis  Resolved      Acute Respiratory Failure post op  Improved with aggressive diuresis  Off supplemental O2  Pulmonary hygiene   Cont home CPAP- must use while sleeping   Schedule PET CT prior to discharge    Acute on CKD 3, atrophic left kidney  Appreciate Nephrology assistance  Watch Cr, stable  Hold diuretics   Avoid nephrotoxins, trend labs      CAD s/p CABG   Off ASA d/t hematuria  No BB d/t RV failure  Hold statin due to recent hepatic failure   LHC performed 11/13 - low likelihood of benefit from revascularization      Hx of VF arrest s/p BiV-ICD  No recent shocks  Keep K > 4 and Mg > 2   Mag ox 800 mg po BID  ICD reprogrammed to reduce diaphragmatic stimulation     PAF   Dig level 0.7-cont dig (62.5 mcg qMWF)  No BB due to RV failure   Repeat TFTs WNL     Hx of gout  Uric acid WNL    Acute blood loss anemia  Likely due to hematuria  Cont octreotide 25 mcg SQ TID   Fecal occult 12/14 negative, positive on 12/17  Appreciate urology recommendations  Hematuria improved  Monitor for now     Urinary retention, c/b serratia UTI and hematuria  Appreciate Urology consult   Repeat UA with cx negative 12/15   Cystoscopy performed 11/13 shows catheter induced cystitis   Pseudomonas aeruginosa positive UA culture (12/31/19) - on Cefepmine    Malnutrition   Eating better  Prealbumin weekly - 13.3 on 1/5  Appreciate Nutritionist assistance  Diet as tolerated, encourage food from home, protein shakes  Intolerant of mirtazapine d/t tremors, confusion   Cont MVI add thiamine 100mg daily   Hold on DHT placement for now     COPD   Appreciate Pulmonology assistance   Continue nebs PRN       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  No additional treatment at this time     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  Etiology unclear  Continue [facility] [place], operated by [facility]  Appreciate neurology assistance      Hx of sternal wound infection, sternectomy  Sternum closed post op- monitor     Vocal cord paralysis  Improved  ENT recs appreciated  Speech therapy following - appreciate input    Anxiety  Klonipin 0.5 mg TID prn anxiety    Depression  Continue Effexor XR 75mg daily  Monitor rhythm strips for prolonged QTc     Debility  Continue PT/OT     Bladder spasms  Received pyridium 100mg x4 doses  Oxybutynin 5mg Q6hr prn      Ppx  Protonix   PT/OT   +daily BM   PICC placed 11/22/19      Dispo:  Remain in [facility]SU at this time  Will need IP rehab. Not ready for discharge at this time        Patient seen and examined. Data and note reviewed. I have discussed and agree with the plans as noted. 71year old male with a history of ICM, LVAD as DT whose post op course has been complicated by RV failure, frailty, and orthostatic hypotension. He is on midodrine to offset the vasodilating effect of flomax and proscar, which are required for his urinary retention. He remains on IV antibiotics for pseudomonas bacteremia following a pseudomonas UTI. Continue PT/OT and LVAD education. Thank you for allowing us to participate in your patient's care. Mounika Kim MD, [facility]  Chief of Cardiology, [address] [place] [address], [address] [place] [address]  Office [phone]  Fax [phone]        LVAD INTERROGATION:  Device interrogated in person  Device function normal, normal flow, multiple PI events    LVAD   Pump Speed (RPM): 6600  Pump Flow (LPM): 5.6  MAP: 89  PI (Pulsitility Index): 3.5  Power: 6   Test: No  Back Up  at Bedside & Labeled: Yes  Power Module Test: No  Driveline Site Care: No  Driveline Dressing: Clean, Dry, and Intact  Outpatient: No  MAP in Therapeutic Range (Outpatient): Yes  Testing  Alarms Reviewed: Yes  Back up SC speed: 6400  Back up Low Speed Limit: 6000  Emergency Equipment with Patient?: Yes  Emergency procedures reviewed?: No  Drive line site inspected?: No  Drive line intergrity inspected?: Yes  Drive line dressing changed?: No    PHYSICAL EXAM:  Visit Vitals  BP 91/72 (BP 1 Location: Left arm, BP Patient Position: At rest)   Pulse 89   Temp 98.1 °F (36.7 °C)   Resp 16 Ht 6' 2\" (1.88 m)   Wt 167 lb 8.8 oz (76 kg)   SpO2 97%   BMI 21.51 kg/m²       Physical Exam   Constitutional: He is oriented to person, place, and time. He appears well-developed. He appears cachectic. No distress. HENT:   Head: Normocephalic. Neck: Normal range of motion. Neck supple. No JVD present. Cardiovascular: Normal rate, regular rhythm and normal heart sounds. + VAD hum    Pulmonary/Chest: Effort normal and breath sounds normal. No tachypnea. No respiratory distress. Abdominal: Soft. Bowel sounds are normal. He exhibits no distension. Musculoskeletal: Normal range of motion. General: No edema. Neurological: He is alert and oriented to person, place, and time. Skin: Skin is warm and dry. Psychiatric: He has a normal mood and affect. His speech is normal and behavior is normal.   Nursing note and vitals reviewed. REVIEW OF SYSTEMS:  Review of Systems   Constitutional: Positive for malaise/fatigue. Negative for chills and fever. HENT: Positive for hearing loss. Negative for nosebleeds. Eyes: Negative. Respiratory: Negative for cough and shortness of breath. Mild dyspnea   Cardiovascular: Negative for chest pain, palpitations, orthopnea and leg swelling. Orthostatic   Gastrointestinal: Negative for heartburn, nausea and vomiting. Genitourinary: Negative for dysuria and hematuria. Musculoskeletal: Negative. Neurological: Positive for dizziness and weakness. Negative for headaches. Mild dizziness - improving    Endo/Heme/Allergies: Does not bruise/bleed easily.        PAST MEDICAL HISTORY:  Past Medical History:   Diagnosis Date    Degenerative disc disease, lumbar     Heart failure ([address]     High cholesterol     Hypertension     Paroxysmal atrial fibrillation ([address] 4/2/2019    Spinal stenosis        PAST SURGICAL HISTORY:  Past Surgical History:   Procedure Laterality Date    COLONOSCOPY Left 11/11/2019    COLONOSCOPY at bedside [facility]agan [facility] ENDOSCOPY    HX APPENDECTOMY      HX CORONARY ARTERY BYPASS GRAFT      triple    HX HERNIA REPAIR      HX IMPLANTABLE CARDIOVERTER DEFIBRILLATOR      DE CARDIOVERSION ELECTIVE ARRHYTHMIA EXTERNAL N/A 6/10/2019    EP CARDIOVERSION performed by Aury David MD at [address], [facility] CATH LAB    DE CARDIOVERSION ELECTIVE ARRHYTHMIA EXTERNAL N/A 2019    EP CARDIOVERSION performed by Ina Luque MD at [address], [facility] [address] CATH LAB    DE INSJ/RPLCMT PERM DFB W/TRNSVNS LDS 1/DUAL CHMBR N/A 2019    INSERT ICD BIV MULTI performed by Xenia Pat MD at [address] [facility] [address] CATH LAB    DE TCAT IMPL WRLS P-ART PRS SNR L-T HEMODYN MNTR N/A 2019    IMPLANT HEART FAILURE MONITORING DEVICE performed by Miles Garza MD at [address] [facility] CATH LAB       FAMILY HISTORY:  Family History   Problem Relation Age of Onset    Lung Disease Mother     Hypertension Mother     Arthritis-osteo Mother     Heart Disease Mother     Heart Disease Father     Diabetes Father        SOCIAL HISTORY:  Social History     Socioeconomic History    Marital status:      Spouse name: Not on file    Number of children: Not on file    Years of education: Not on file    Highest education level: Not on file   Tobacco Use    Smoking status: Former Smoker     Last attempt to quit: 2010     Years since quittin.1    Smokeless tobacco: Never Used   Substance and Sexual Activity    Alcohol use: Not Currently     Comment: rarely    Drug use: Never   Other Topics Concern       LABORATORY RESULTS:     Labs Latest Ref Rng & Units 2020 2020 2020 2020 1/3/2020 2020 2020   WBC 4.1 - 11.1 K/uL 6.4 6.8 6.2 6.1 5.6 7.5 -   RBC 4.10 - 5.70 M/uL 2.99(L) 2.98(L) 3.10(L) 3.05(L) 2.97(L) 2.77(L) -   Hemoglobin 12.1 - 17.0 g/dL 8.7(L) 8.5(L) 8. 9(L) 8.8(L) 8.5(L) 8.2(L) 8.4(L)   Hematocrit 36.6 - 50.3 % 28. 4(L) 28. 3(L) 29. 3(L) 28. 5(L) 28. 0(L) 26. 0(L) 26. 9(L)   MCV 80.0 - 99.0 FL 95.0 95.0 94.5 93.4 94.3 93.9 -   Platelets 364 - 458 K/uL 138(L) 123(L) 127(L) 131(L) 124(L) 123(L) -   Lymphocytes 12 - 49 % - - - - - - -   Monocytes 5 - 13 % - - - - - - -   Eosinophils 0 - 7 % - - - - - - -   Basophils 0 - 1 % - - - - - - -   Albumin 3.5 - 5.0 g/dL 2. 3(L) 2. 5(L) 2. 4(L) 2. 4(L) 2. 5(L) 2. 6(L) -   Calcium 8.5 - 10.1 MG/DL 8.7 8.9 9.0 8.3(L) 8.6 8.9 -   SGOT 15 - 37 U/L 18 18 19 21 18 20 -   Glucose 65 - 100 mg/dL 114(H) 104(H) 102(H) 105(H) 97 104(H) -   BUN 6 - 20 MG/DL 26(H) 23(H) 21(H) 20 19 21(H) -   Creatinine 0.70 - 1.30 MG/DL 1.04 1.11 1.09 1.03 1.02 1.15 -   Sodium 136 - 145 mmol/L 130(L) 131(L) 130(L) 132(L) 131(L) 129(L) -   Potassium 3.5 - 5.1 mmol/L 4.8 4.9 4.6 4.8 4.6 4.3 -   TSH 0.36 - 3.74 uIU/mL - - - - - - -   LDH 85 - 241 U/L 202 186 185 319(H) 166 162 -   CEA ng/mL - - - - - - -   Some recent data might be hidden     Lab Results   Component Value Date/Time    TSH 2.30 12/03/2019 03:29 AM    TSH 2.40 10/25/2019 07:39 PM    TSH 2.45 06/01/2019 04:16 AM       ALLERGY:  No Known Allergies     CURRENT MEDICATIONS:  Current Facility-Administered Medications   Medication Dose Route Frequency    midodrine (PROAMITINE) tablet 5 mg  5 mg Oral TID WITH MEALS    hydrocortisone (CORTAID) 1 % cream   Topical TID PRN    sildenafil (pulm.hypertension) (REVATIO) tablet 20 mg  20 mg Oral TID    thiamine HCL (B-1) tablet 100 mg  100 mg Oral DAILY    venlafaxine-SR (EFFEXOR-XR) capsule 75 mg  75 mg Oral DAILY WITH BREAKFAST    levoFLOXacin (LEVAQUIN) 750 mg in D5W IVPB  750 mg IntraVENous Q24H    cefepime (MAXIPIME) 2 g in 0.9% sodium chloride (MBP/ADV) 100 mL  2 g IntraVENous Q8H    oxybutynin (DITROPAN) tablet 5 mg  5 mg Oral Q6H PRN    magnesium oxide (MAG-OX) tablet 800 mg  800 mg Oral BID    albumin human 5% (BUMINATE) solution 12.5 g  12.5 g IntraVENous DIALYSIS PRN    lidocaine (URO-JET/ GLYDO) 2 % jelly   Urethral PRN    epoetin yvonne-epbx (RETACRIT) injection 20,000 Units  20,000 Units SubCUTAneous Q MON, WED & FRI    albuterol-ipratropium (DUO-NEB) 2.5 MG-0.5 MG/3 ML  3 mL Nebulization Q6H PRN    therapeutic multivitamin (THERAGRAN) tablet 1 Tab  1 Tab Oral DAILY    alteplase (CATHFLO) 1 mg in sterile water (preservative free) 1 mL injection  1 mg InterCATHeter PRN    finasteride (PROSCAR) tablet 5 mg  5 mg Oral DAILY    clonazePAM (KlonoPIN) tablet 0.5 mg  0.5 mg Oral TID PRN    diphenhydrAMINE (BENADRYL) capsule 25 mg  25 mg Oral QHS PRN    octreotide (SANDOSTATIN) injection 25 mcg  25 mcg SubCUTAneous TID    benzocaine-menthol (CEPACOL) lozenge 1 Lozenge  1 Lozenge Mucous Membrane PRN    digoxin (LANOXIN) tablet 0.0625 mg  62.5 mcg Oral Q MON, WED & FRI    pantoprazole (PROTONIX) tablet 40 mg  40 mg Oral ACB    allopurinol (ZYLOPRIM) tablet 100 mg  100 mg Oral DAILY    arformoterol (BROVANA) neb solution 15 mcg  15 mcg Nebulization BID RT    And    budesonide (PULMICORT) 500 mcg/2 ml nebulizer suspension  500 mcg Nebulization BID RT    artificial saliva (MOUTH KOTE) 1 Spray  1 Spray Oral PRN    lactobac ac& pc-s.therm-b.anim (TIAN Q/RISAQUAD)  1 Cap Oral DAILY    oxyCODONE IR (ROXICODONE) tablet 5 mg  5 mg Oral Q6H PRN    tamsulosin (FLOMAX) capsule 0.4 mg  0.4 mg Oral DAILY    Warfarin MD/NP dosing   Other PRN    sodium chloride (NS) flush 5-40 mL  5-40 mL IntraVENous Q8H    sodium chloride (NS) flush 5-40 mL  5-40 mL IntraVENous PRN    acetaminophen (TYLENOL) tablet 650 mg  650 mg Oral Q6H PRN    naloxone (NARCAN) injection 0.4 mg  0.4 mg IntraVENous PRN    ondansetron (ZOFRAN) injection 4 mg  4 mg IntraVENous Q4H PRN    bisacodyl (DULCOLAX) tablet 5 mg  5 mg Oral DAILY PRN    sucralfate (CARAFATE) tablet 1 g  1 g Oral AC&HS    influenza vaccine 2019-20 (6 mos+)(PF) (FLUARIX/FLULAVAL/FLUZONE QUAD) injection 0.5 mL  0.5 mL IntraMUSCular PRIOR TO DISCHARGE    hydrALAZINE (APRESOLINE) 20 mg/mL injection 20 mg  20 mg IntraVENous Q6H PRN    dextrose 10 % infusion 125-250 mL  125-250 mL IntraVENous PRN         Thank you for allowing me to participate in this patient's care.     Kacey Pina NP  [address] [place] [address], [address]  Phone: [phone]  Fax: [phone]

## 2020-01-07 NOTE — PROGRESS NOTES
[facility]   [address] [facility], [address] [facility], [facility]  Phone: [phone]   [phone]       Nephrology Progress Note  Sarah Bass     [id number]     [id number]  Date of Admission : 10/25/2019  01/07/20    CC: Follow up for JUAN J, CKD, Hyponatremia      Assessment and Plan   JUAN J on CKD:  - Cr stable,  - cont present care    Pseudomonal UTI and bacteremia:  - from cultures on 12/29. F/u Cx pending from today   - on cefepime per ID    Orthostatic hypotension w/ syncope:  - on midodrine    Hyponatremia :  - Na stable at 130    Gross hematuria, BPH w/ retention:  - off CBI pre VAD. 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 CT in place    L arm clot s/p thrombectomy on 11/25    JOSE on CPAP      PAF s/p DCCV 6/19     Anemia:  - from blood loss s/p multiple blood products + IV iron  - cont PAVEL     Interval History:    Seen and examined. He is sitting in the chair. Feels better. Walked to toilet .   No cp or sob reported    Review of Systems: as per HPI    Current Medications:   Current Facility-Administered Medications   Medication Dose Route Frequency    midodrine (PROAMITINE) tablet 5 mg  5 mg Oral TID WITH MEALS    hydrocortisone (CORTAID) 1 % cream   Topical TID PRN    sildenafil (pulm.hypertension) (REVATIO) tablet 20 mg  20 mg Oral TID    thiamine HCL (B-1) tablet 100 mg  100 mg Oral DAILY    venlafaxine-SR (EFFEXOR-XR) capsule 75 mg  75 mg Oral DAILY WITH BREAKFAST    levoFLOXacin (LEVAQUIN) 750 mg in D5W IVPB  750 mg IntraVENous Q24H    cefepime (MAXIPIME) 2 g in 0.9% sodium chloride (MBP/ADV) 100 mL  2 g IntraVENous Q8H  oxybutynin (DITROPAN) tablet 5 mg  5 mg Oral Q6H PRN    magnesium oxide (MAG-OX) tablet 800 mg  800 mg Oral BID    albumin human 5% (BUMINATE) solution 12.5 g  12.5 g IntraVENous DIALYSIS PRN    lidocaine (URO-JET/ GLYDO) 2 % jelly   Urethral PRN    epoetin yvonne-epbx (RETACRIT) injection 20,000 Units  20,000 Units SubCUTAneous Q MON, WED & FRI    albuterol-ipratropium (DUO-NEB) 2.5 MG-0.5 MG/3 ML  3 mL Nebulization Q6H PRN    therapeutic multivitamin (THERAGRAN) tablet 1 Tab  1 Tab Oral DAILY    alteplase (CATHFLO) 1 mg in sterile water (preservative free) 1 mL injection  1 mg InterCATHeter PRN    finasteride (PROSCAR) tablet 5 mg  5 mg Oral DAILY    clonazePAM (KlonoPIN) tablet 0.5 mg  0.5 mg Oral TID PRN    diphenhydrAMINE (BENADRYL) capsule 25 mg  25 mg Oral QHS PRN    octreotide (SANDOSTATIN) injection 25 mcg  25 mcg SubCUTAneous TID    benzocaine-menthol (CEPACOL) lozenge 1 Lozenge  1 Lozenge Mucous Membrane PRN    digoxin (LANOXIN) tablet 0.0625 mg  62.5 mcg Oral Q MON, WED & FRI    pantoprazole (PROTONIX) tablet 40 mg  40 mg Oral ACB    allopurinol (ZYLOPRIM) tablet 100 mg  100 mg Oral DAILY    arformoterol (BROVANA) neb solution 15 mcg  15 mcg Nebulization BID RT    And    budesonide (PULMICORT) 500 mcg/2 ml nebulizer suspension  500 mcg Nebulization BID RT    artificial saliva (MOUTH KOTE) 1 Spray  1 Spray Oral PRN    lactobac ac& pc-s.therm-b.anim (TIAN Q/RISAQUAD)  1 Cap Oral DAILY    oxyCODONE IR (ROXICODONE) tablet 5 mg  5 mg Oral Q6H PRN    tamsulosin (FLOMAX) capsule 0.4 mg  0.4 mg Oral DAILY    insulin lispro (HUMALOG) injection   SubCUTAneous AC&HS    Warfarin MD/NP dosing   Other PRN    sodium chloride (NS) flush 5-40 mL  5-40 mL IntraVENous Q8H    sodium chloride (NS) flush 5-40 mL  5-40 mL IntraVENous PRN    acetaminophen (TYLENOL) tablet 650 mg  650 mg Oral Q6H PRN    naloxone (NARCAN) injection 0.4 mg  0.4 mg IntraVENous PRN    ondansetron (ZOFRAN) injection 4 mg  4 mg IntraVENous Q4H PRN    bisacodyl (DULCOLAX) tablet 5 mg  5 mg Oral DAILY PRN    sucralfate (CARAFATE) tablet 1 g  1 g Oral AC&HS    influenza vaccine 2019-20 (6 mos+)(PF) (FLUARIX/FLULAVAL/FLUZONE QUAD) injection 0.5 mL  0.5 mL IntraMUSCular PRIOR TO DISCHARGE    hydrALAZINE (APRESOLINE) 20 mg/mL injection 20 mg  20 mg IntraVENous Q6H PRN    dextrose 10 % infusion 125-250 mL  125-250 mL IntraVENous PRN      No Known Allergies    Objective:  Vitals:    Vitals:    01/07/20 0726 01/07/20 0756 01/07/20 0819 01/07/20 1112   BP:       Pulse:  83  85   Resp:  18  16   Temp:  97.5 °F (36.4 °C)  97.8 °F (36.6 °C)   SpO2:  100% 94% 100%   Weight: 76 kg (167 lb 8.8 oz)      Height:         Intake and Output:  01/07 0701 - 01/07 1900  In: 440 [P.O.:340; I.V.:100]  Out: 850 [Urine:850]  01/05 1901 - 01/07 0700  In: 1930 [P.O.:1380; I.V.:550]  Out: 2247 [Urine:3200]    Physical Examination:    General: Elderly man in no acute distress  Neck:  No lines   Resp:  TA  CV:  VAD sounds; No LE edema  GI:  Soft and non-tender; no distension  Neurologic:  Alert and appropriate; normal speech  :  No Lizama    [x]    High complexity decision making was performed  [x]    Patient is at high-risk of decompensation with multiple organ involvement    Lab Data Personally Reviewed: I have reviewed all the pertinent labs, microbiology data and radiology studies during assessment.     Recent Labs     01/07/20  0258 01/06/20  0019 01/05/20  0429   * 131* 130*   K 4.8 4.9 4.6    100 99   CO2 25 25 26   * 104* 102*   BUN 26* 23* 21*   CREA 1.04 1.11 1.09   CA 8.7 8.9 9.0   MG 1.8 1.8 1.7   ALB 2.3* 2.5* 2.4*   SGOT 18 18 19   ALT 18 16 16   INR 1.4* 1.3* 1.3*     Recent Labs     01/07/20  0258 01/06/20  0019 01/05/20  0429   WBC 6.4 6.8 6.2   HGB 8.7* 8.5* 8.9*   HCT 28.4* 28.3* 29.3*   * 123* 127*     No results found for: SDES  Lab Results   Component Value Date/Time    Culture result: LIGHT NORMAL RESPIRATORY TIAN 12/31/2019 04:18 PM    Culture result: (A) 12/31/2019 03:24 PM     PSEUDOMONAS AERUGINOSA ISOLATED FROM 2 OF 4 BOTTLES DRAWN, EACH FROM A DIFFERENT SITE. .. RFA AND LEFT WRIST    Culture result: (A) 12/31/2019 03:24 PM     PRELIMINARY REPORT OF GRAM NEGATIVE RODS GROWING IN 1 OF 4 BOTTLES DRAWN CALLED TO AND READ BACK BY Staci Alford RN AT 9725 ON 1.1.20 RB    Culture result: (A) 12/31/2019 03:24 PM     PRELIMINARY REPORT OF GRAM NEGATIVE RODS GROWING IN 2ND OF 4 BOTTLES DRAWN (DIFFERENT SITE=L WRIST) CALLED TO AND READ BACK BY Staci Alford RN AT 15:51 ON 1/1/20 BY [facility]. Culture result: REMAINING BOTTLE(S) HAS/HAVE NO GROWTH IN 5 DAYS 12/31/2019 03:24 PM     Recent Results (from the past 24 hour(s))   GLUCOSE, POC    Collection Time: 01/06/20  4:17 PM   Result Value Ref Range    Glucose (POC) 122 (H) 65 - 100 mg/dL    Performed by JESSICA REYES    GLUCOSE, POC    Collection Time: 01/06/20  9:17 PM   Result Value Ref Range    Glucose (POC) 106 (H) 65 - 100 mg/dL    Performed by Marley Gunderson    METABOLIC PANEL, COMPREHENSIVE    Collection Time: 01/07/20  2:58 AM   Result Value Ref Range    Sodium 130 (L) 136 - 145 mmol/L    Potassium 4.8 3.5 - 5.1 mmol/L    Chloride 101 97 - 108 mmol/L    CO2 25 21 - 32 mmol/L    Anion gap 4 (L) 5 - 15 mmol/L    Glucose 114 (H) 65 - 100 mg/dL    BUN 26 (H) 6 - 20 MG/DL    Creatinine 1.04 0.70 - 1.30 MG/DL    BUN/Creatinine ratio 25 (H) 12 - 20      GFR est AA >60 >60 ml/min/1.73m2    GFR est non-AA >60 >60 ml/min/1.73m2    Calcium 8.7 8.5 - 10.1 MG/DL    Bilirubin, total 0.5 0.2 - 1.0 MG/DL    ALT (SGPT) 18 12 - 78 U/L    AST (SGOT) 18 15 - 37 U/L    Alk.  phosphatase 111 45 - 117 U/L    Protein, total 6.6 6.4 - 8.2 g/dL    Albumin 2.3 (L) 3.5 - 5.0 g/dL    Globulin 4.3 (H) 2.0 - 4.0 g/dL    A-G Ratio 0.5 (L) 1.1 - 2.2     MAGNESIUM    Collection Time: 01/07/20  2:58 AM   Result Value Ref Range    Magnesium 1.8 1.6 - 2.4 mg/dL   CBC W/O DIFF    Collection Time: 01/07/20  2:58 AM   Result Value Ref Range    WBC 6.4 4.1 - 11.1 K/uL    RBC 2.99 (L) 4.10 - 5.70 M/uL    HGB 8.7 (L) 12.1 - 17.0 g/dL    HCT 28.4 (L) 36.6 - 50.3 %    MCV 95.0 80.0 - 99.0 FL    MCH 29.1 26.0 - 34.0 PG    MCHC 30.6 30.0 - 36.5 g/dL    RDW 18.7 (H) 11.5 - 14.5 %    PLATELET 756 (L) 148 - 400 K/uL    MPV 10.1 8.9 - 12.9 FL    NRBC 0.0 0  WBC    ABSOLUTE NRBC 0.00 0.00 - 0.01 K/uL   PROTHROMBIN TIME + INR    Collection Time: 01/07/20  2:58 AM   Result Value Ref Range    INR 1.4 (H) 0.9 - 1.1      Prothrombin time 14.2 (H) 9.0 - 11.1 sec   PTT    Collection Time: 01/07/20  2:58 AM   Result Value Ref Range    aPTT 33.3 (H) 22.1 - 32.0 sec    aPTT, therapeutic range     58.0 - 77.0 SECS   DIGOXIN    Collection Time: 01/07/20  2:58 AM   Result Value Ref Range    Digoxin level 0.7 (L) 0.90 - 2.00 NG/ML   LACTIC ACID    Collection Time: 01/07/20  2:58 AM   Result Value Ref Range    Lactic acid 0.9 0.4 - 2.0 MMOL/L   PROCALCITONIN    Collection Time: 01/07/20  2:58 AM   Result Value Ref Range    Procalcitonin 0.27 ng/mL   LD    Collection Time: 01/07/20  2:58 AM   Result Value Ref Range     85 - 241 U/L   NT-PRO BNP    Collection Time: 01/07/20  2:58 AM   Result Value Ref Range    NT pro-BNP 2,772 (H) <125 PG/ML   GLUCOSE, POC    Collection Time: 01/07/20  6:52 AM   Result Value Ref Range    Glucose (POC) 115 (H) 65 - 100 mg/dL    Performed by Monique Freeman, POC    Collection Time: 01/07/20 11:16 AM   Result Value Ref Range    Glucose (POC) 137 (H) 65 - 100 mg/dL    Performed by Trinity Davis MD

## 2020-01-07 NOTE — PROGRESS NOTES
Checked in with Carter this morning. Brittany Goldberg was participating with PT/OT. She acknowledges feeling overwhelmed at times with his prolonged hospitalization but is trying to make the best of the situation. She is pleased to hear that Brittany Goldberg did well today with PT/OT and is hopeful for him to return home at some point.  continuing to follow for emotional support.     Rheumatoid Arthritis  l

## 2020-01-07 NOTE — PROGRESS NOTES
ID Progress Note  2020    Subjective:     Doing ok, seems to be feeling stronger    Objective:     Antibiotics:  1. Levaquin  2. Cefepime   3. Rifampin   4. Fluconazole  5. Vancomycin    6. Cefazolin   7. Zosyn       Vitals:   Visit Vitals  BP 91/72 (BP 1 Location: Left arm, BP Patient Position: At rest)   Pulse 85   Temp 97.8 °F (36.6 °C)   Resp 16   Ht 6' 2\" (1.88 m)   Wt 76 kg (167 lb 8.8 oz)   SpO2 100%   BMI 21.51 kg/m²        Tmax:  Temp (24hrs), Av °F (36.7 °C), Min:97.5 °F (36.4 °C), Max:98.5 °F (36.9 °C)      Exam:  Lungs:  clear to auscultation bilaterally  Heart:  regular rate and rhythm  Abdomen:  soft, non-tender. Bowel sounds normal. No masses,  no organomegaly  Urine in neal is grossly clear      Labs:      Recent Labs     20  0258 20  0019 20  0429   WBC 6.4 6.8 6.2   HGB 8.7* 8.5* 8.9*   * 123* 127*   BUN 26* 23* 21*   CREA 1.04 1.11 1.09   SGOT 18 18 19    118* 115   TBILI 0.5 0.7 0.7       Cultures:     No results found for: SDES  Lab Results   Component Value Date/Time    Culture result: LIGHT NORMAL RESPIRATORY TIAN 2019 04:18 PM    Culture result: (A) 2019 03:24 PM     PSEUDOMONAS AERUGINOSA ISOLATED FROM 2 OF 4 BOTTLES DRAWN, EACH FROM A DIFFERENT SITE. .. RFA AND LEFT WRIST    Culture result: (A) 2019 03:24 PM     PRELIMINARY REPORT OF GRAM NEGATIVE RODS GROWING IN 1 OF 4 BOTTLES DRAWN CALLED TO AND READ BACK BY Tasneem Murphy RN AT 9231 ON 20 RB    Culture result: (A) 2019 03:24 PM     PRELIMINARY REPORT OF GRAM NEGATIVE RODS GROWING IN 2ND OF 4 BOTTLES DRAWN (DIFFERENT SITE=L WRIST) CALLED TO AND READ BACK BY Tasneem Murphy RN AT 15:51 ON 20 BY [facility]. Culture result: REMAINING BOTTLE(S) HAS/HAVE NO GROWTH IN 5 DAYS 2019 03:24 PM       Radiology:     Line/Insert Date:           Assessment:     1.  UTI--Serratia (2 biotypes) from culture and small amount of Enterococcus--now with Pseudomonas from culture of urine and blood  2. CHF/cardiomyopathy  3. ?aspiration event(s)  4. Renal insufficiency  5. Respiratory difficulties    Objective:     1. Continue current therapy  2.  Presence of LVAD in face of bacteremia may require longer course of therapy, or at least PO Rx for a time      Parveen Prajapati MD

## 2020-01-07 NOTE — PROGRESS NOTES
1930: Bedside shift change report received by Hudson Hawk (offgoing nurse). Report included the following information SBAR, Kardex, Procedure Summary, Intake/Output, MAR, Recent Results, and Cardiac Rhythm paced . 0730: Bedside shift change report given to Hudson Hawk (oncoming nurse). Report included the following information SBAR, Kardex, Procedure Summary, Intake/Output, MAR, Recent Results and Cardiac Rhythm Paced.     ----------------  Care Plan 2014  Problem: Falls - Risk of  Goal: *Absence of Falls  Description  Document Frida Corral Fall Risk and appropriate interventions in the flowsheet. Outcome: Progressing Towards Goal  Note: Fall Risk Interventions:  Mobility Interventions: Communicate number of staff needed for ambulation/transfer, OT consult for ADLs, Patient to call before getting OOB, PT Consult for mobility concerns    Mentation Interventions: Door open when patient unattended    Medication Interventions: Evaluate medications/consider consulting pharmacy, Patient to call before getting OOB, Teach patient to arise slowly    Elimination Interventions: Call light in reach, Patient to call for help with toileting needs, Stay With Me (per policy), Toileting schedule/hourly rounds    History of Falls Interventions: Consult care management for discharge planning, Evaluate medications/consider consulting pharmacy, Room close to nurse's station         Problem: Pressure Injury - Risk of  Goal: *Prevention of pressure injury  Description  Document Mich Scale and appropriate interventions in the flowsheet.   Outcome: Progressing Towards Goal  Note: Pressure Injury Interventions:  Sensory Interventions: Assess changes in LOC, Keep linens dry and wrinkle-free, Minimize linen layers    Moisture Interventions: Check for incontinence Q2 hours and as needed, Minimize layers    Activity Interventions: Increase time out of bed, Pressure redistribution bed/mattress(bed type), PT/OT evaluation    Mobility Interventions: HOB 30 degrees or less, Pressure redistribution bed/mattress (bed type), PT/OT evaluation, Turn and reposition approx. every two hours(pillow and wedges)    Nutrition Interventions: Document food/fluid/supplement intake    Friction and Shear Interventions: Lift sheet, Minimize layers                Problem: LVAD Post-op Day 15 to Discharge  Goal: Activity/Safety  Outcome: Progressing Towards Goal  Goal: Nutrition/Diet  Outcome: Progressing Towards Goal  Goal: Medications  Outcome: Progressing Towards Goal  Note:   See MAR. Goal: Discharge Planning  Outcome: Progressing Towards Goal  Goal: Respiratory  Outcome: Progressing Towards Goal  Note:   1L NC for comfort  Goal: Psychosocial  Outcome: Progressing Towards Goal  Note:   Calm, cooperative, pleasant.

## 2020-01-07 NOTE — DIABETES MGMT
YUN SANON  CLINICAL NURSE SPECIALIST   Followup Progress Note    Presentation   Natalie Lofton is a 71 y.o. male admitted with heart failure and consulted by Provider for advanced diabetes nursing assessment and care, specifically related to Complex Case Management . Current clinical course has been complicated by LVAD placement, renal insufficiency, and UTI. Subjective   Mr Abhilash Casillas has done well with the discontinuation of sliding scale insulin with a blood glucose stable of  over the past 24 hours. Objective   Physical exam    General Alert, oriented and in no acute distress/ill-appearing. Conversant and cooperative  Vital Signs   Visit Vitals  BP 91/72 (BP 1 Location: Left arm, BP Patient Position: At rest)   Pulse 85   Temp 97.8 °F (36.6 °C)   Resp 16   Ht 6' 2\" (1.88 m)   Wt 76 kg (167 lb 8.8 oz)   SpO2 100%   BMI 21.51 kg/m²     Skin  Warm and dry  Heart   Regular rate and rhythm. No murmurs, rubs or gallops  Lungs  Clear to auscultation without rales or rhonchi  Extremities No foot wounds    Laboratory      Blood glucose pattern    Metric Breakfast Lunch Dinner Bedtime    137 1/6 122 1/6 106   Basal dose NA      Bolus NA      Correction NA        Assessment and Plan   Nursing Diagnosis Risk for unstable blood glucose pattern   Nursing Intervention Domain 5250 Decision-making Support   Nursing Interventions Examined current inpatient diabetes control   Explored factors facilitating and impeding inpatient management  Identified self-management practices impeding diabetes control  Explored corrective strategies with patient and responsible inpatient provider   Informed patient of rational for basal bolus insulin strategy while hospitalized     Evaluation   stabilized blood glucose levels in the past 24 hours      Recommendations   1. D/C POC glucose testing    2.  If his nutrition needs or diet changes and you see a rise in his blood glucose over 200, you can start basal lantus (0.2 units/kg) daily with mealtime and correctional coverage. Reach out if you do start this as his mealtime coverage would depend on his nutritional intake at that time. Please reach out upon discharge to determine if there are any blood glucose management needs at that time. 3. No further need to be followed by [facility] at this time. Please reach out at any time if patient needs change for additional blood glucose management support.     Billing Code(s)   84553 (Based on 15 minutes of time)      Eric Sow RN CNS

## 2020-01-07 NOTE — PROGRESS NOTES
Problem: Mobility Impaired (Adult and Pediatric)  Goal: *Acute Goals and Plan of Care (Insert Text)  Description  FUNCTIONAL STATUS PRIOR TO ADMISSION: Patient was modified independent using a single point cane for functional mobility. Patient reports an increasingly sedentary lifestyle 2* fatigue and SOB/dyspnea. Retired (so is his wife). Patient reports x 3 falls within the last couple of weeks. Patient is wearing either nasal cannula or CPAP at night. LVAD work-up has been initiated. HOME SUPPORT PRIOR TO ADMISSION: The patient lived with his wife, but did not require physical assistance. Physical Therapy Goals:    Goals upgraded as appropriate 1/07/2020  1. Patient will move from supine to sit and sit to supine, scoot up and down and roll side to side in bed with supervision within 7 days. 2.  Patient will perform sit to/from stand with minimal assistance x 1 within 7 days. 3.  Patient will ambulate 50 feet with least restrictive assistive device and moderate assistance within 7 days. 4.  Stair goal to be formulated when appropriate. 5.  Patient will perform cardiac exercises per protocol with supervision within 7 days. 6.  Patient will verbally and functionally recall mindful movement principles (Move in the Tube) with minimal verbal cuing/reminding within 7 days. 7.  Patient will perform power exchange for power module to/from battery with minimal assistance within 7 days. Reassessed on 1/2/2020 and goals not met, carry-over. Reassessed on 12/26/2019, goals not met but remain appropriate, so carry over  Weekly reassessment completed 12/19/2019 and goals downgraded as appropriate. 1.  Patient will move from supine to sit and sit to supine, scoot up and down and roll side to side in bed with contact guard assistance within 7 days. 2.  Patient will perform sit to/from stand with minimal assistance x 1 within 7 days.   3.  Patient will ambulate 25 feet with least restrictive assistive device and moderate assistance within 7 days. 4.  Stair goal to be formulated when appropriate. 5.  Patient will perform cardiac exercises per protocol with supervision within 7 days. 6.  Patient will verbally and functionally recall mindful movement principles (Move in the Tube) with minimal verbal cuing/reminding within 7 days. 7.  Patient will perform power exchange for power module to/from battery with moderate assistance within 7 days. Re-evaluation completed 11/20/2019 and new goals formulated following LVAD implantation. Reviewed and cont 12/3/2019. Reviewed and continued 12/12/2019  1. Patient will move from supine to sit and sit to supine, scoot up and down and roll side to side in bed with minimal assistance/contact guard assist within 7 days. 2.  Patient will perform sit to/from stand with minimal assistance/contact guard assist within 7 days. 3.  Patient will ambulate 150 feet with least restrictive assistive device and minimal assistance/contact guard assist within 7 days. 4.  Patient will ascend/descend 4 stairs with handrail(s) with minimal assistance/contact guard assist within 7 days. 5.  Patient will perform cardiac exercises per protocol with supervision within 7 days. 6.  Patient will verbally and functionally recall 3/3 sternal precautions within 7 days. 7.  Patient will perform power exchange for power module to/from battery with moderate assistance  within 7 days. Initiated 10/27/2019; updated 11/15/2019   1. Patient will move from supine to sit and sit to supine, scoot up and down and roll side to side in bed with independence within 7 days. 2.  Patient will perform sit to/from stand with supervision/set-up within 7 days. 3.  Patient will ambulate 200 feet with least restrictive assistive device and supervision/set-up within 7 days.    4.  Patient will ascend/descend 4 stairs with  handrail(s) with supervision/set-up within 7 days for functional strengthening and community reintegration. .  5.  Patient will verbally and functionally recall 3/3 sternal precautions within 7 days in preparation for LVAD implantation. 6.  Patient will perform a mock power exchange for power module to/from battery with supervision/set-up within 7 days in preparation for LVAD implantation. 1.  Patient will move from supine to sit and sit to supine, scoot up and down and roll side to side in bed with independence within 7 days. 2.  Patient will perform sit to/from stand with supervision/set-up within 7 days. 3.  Patient will ambulate 150 feet with least restrictive assistive device and supervision/set-up within 7 days. 4.  Patient will ascend/descend 4 stairs with  handrail(s) with supervision/set-up within 7 days for functional strengthening and community reintegration. .  5.  Patient will verbally and functionally recall 3/3 sternal precautions within 7 days in preparation for LVAD implantation. 6.  Patient will perform a mock power exchange for power module to/from battery with supervision/set-up within 7 days in preparation for LVAD implantation. Outcome: Progressing Towards Goal     PHYSICAL THERAPY TREATMENT  Patient: Sarah Bass (1 y.o. male)  Date: 1/7/2020  Diagnosis: Acute decompensated heart failure ([facility] [I50.9]   <principal problem not specified>  Procedure(s) (LRB):  LEFT BRACHIAL THROMBECTOMY (Left) 43 Days Post-Op  Precautions: Fall, Sternal(LVAD)  Chart, physical therapy assessment, plan of care and goals were reviewed. ASSESSMENT  Patient continues with skilled PT services and is progressing towards goals. Patient denied dizziness throughout session. Patient tolerated introduction of gait trial with a rolling walker (15 ft, twice) with constant assistance for walker management (frequently running into wall corners and furniture) and up to moderate physical assistance for ataxic gait pattern with scissoring.  Prior to gait training, patient required moderate assistance and colored stickers for contrast in order to assist with power lead management. In-between gait trials, patient required seated rest breaks 2* fatigue and SOB. Patient tolerated standing while reaching for objects in all cardinal planes with contact guard/minimal assistance (standing tolerance during this task ~ 5 minutes). Current Level of Function Impacting Discharge (mobility/balance): Assist x 2 + rolling walker for ambulation with close chair follow    Other factors to consider for discharge: LVAD, motivated, complex hospital stay          PLAN :  Patient continues to benefit from skilled intervention to address the above impairments. Continue treatment per established plan of care. to address goals. Recommendation for discharge: (in order for the patient to meet his/her long term goals)  Therapy 3 hours per day 5-7 days per week    This discharge recommendation:  Has been made in collaboration with the attending provider and/or case management    IF patient discharges home will need the following DME: to be determined (TBD)       SUBJECTIVE:   Patient stated Today's a good day girls.     OBJECTIVE DATA SUMMARY:   Critical Behavior:  Neurologic State: Alert  Orientation Level: Oriented X4  Cognition: Appropriate for age attention/concentration  Safety/Judgement: Decreased awareness of need for safety, Decreased insight into deficits  Functional Mobility Training:    Transfers:  Sit to Stand: Assist x2;Minimum assistance  Stand to Sit: Assist x2; Moderate assistance    Balance:  Sitting: Intact  Standing: Impaired; Without support; With support  Standing - Static: Fair;Constant support  Standing - Dynamic : Poor;Constant support  Ambulation/Gait Training:  Distance (ft): 15 Feet (ft)(x 2)  Assistive Device: Gait belt;Walker, rolling  Ambulation - Level of Assistance: Moderate assistance; Adaptive equipment(+ RW)        Gait Abnormalities: Ataxic;Decreased step clearance; Path deviations;Scissoring        Base of Support: Center of gravity altered;Narrowed    Activity Tolerance:   Fair, requires rest breaks, and observed SOB with activity  Please refer to the flowsheet for vital signs taken during this treatment.     After treatment patient left in no apparent distress:   Sitting in chair and Call bell within reach    COMMUNICATION/COLLABORATION:   The patients plan of care was discussed with: Occupational Therapist, Registered Nurse, Physician, and     Dustin Vega, PT, DPT   Time Calculation: 38 mins

## 2020-01-07 NOTE — PROGRESS NOTES
[facility] VAD/Heart Failure Progress Note    Admit Date: 10/25/2019  POD:  43 Days Post-Op    Procedure:  Procedure(s):  LEFT BRACHIAL THROMBECTOMY        Subjective:   Mild dyspnea, fatigue, and weakness; lethargy improved      Objective:   Vitals:  Blood pressure 91/72, pulse 89, temperature 98.1 °F (36.7 °C), resp. rate 16, height 6' 2\" (1.88 m), weight 167 lb 8.8 oz (76 kg), SpO2 97 %.   Temp (24hrs), Av °F (36.7 °C), Min:97.5 °F (36.4 °C), Max:98.5 °F (36.9 °C)    Hemodynamics:   CO: CO (l/min): 5.8 l/min   CI: CI (l/min/m2): 2.8 l/min/m2   CVP: CVP (mmHg): 4 mmHg (19 1645)   SVR: SVR (dyne*sec)/cm5: 1080 (dyne*sec)/cm5 (19 6636)   PAP Systolic: PAP Systolic: 34 (38/10/47 2614)   PAP Diastolic: PAP Diastolic: 13 (61/35/01 5940)   PVR:     SV02: SVO2 (%): 67 % (19 1300)   SCV02: SCVO2 (%): 75 % (10/29/19 1900)    VAD Interrogation: LVAD (Heartmate)  Pump Speed (RPM): 6600  Pump Flow (LPM): 5.6  Chatter in Lines: No  PI (Pulsitility Index): 3.5  Power: 6  MAP: 90   Test: No  Back Up  at Bedside & Labeled: Yes  Power Module Test: No  Driveline Site Care: No  Driveline Dressing: Clean, Dry, and Intact    EKG/Rhythm:      Extubation Date / Time:     CT Output:     Ventilator:  Ventilator Volumes  Vt Set (ml): 550 ml (19 08)  Vt Exhaled (Machine Breath) (ml): 639 ml (19 08)  Vt Spont (ml): 437 ml (19 1035)  Ve Observed (l/min): 7.25 l/min (19 1035)    Oxygen Therapy:  Oxygen Therapy  O2 Sat (%): 97 % (20 1511)  Pulse via Oximetry: 80 beats per minute (20 0819)  O2 Device: Room air (20 151)  PEEP/CPAP (cm H2O): 1 cm H20 (20 1013)  O2 Flow Rate (L/min): 1 l/min (20 0400)  FIO2 (%): 21 % (19 0823)    CXR:    Admission Weight: Last Weight   Weight: 192 lb 10.9 oz (87.4 kg) Weight: 167 lb 8.8 oz (76 kg)     Intake / Output / Drain:  Current Shift: 701 -  1900  In: 605 [P.O.:440; I.V.:100]  Out: 850 [Urine:850]  Last 24 hrs.:     Intake/Output Summary (Last 24 hours) at 1/7/2020 1719  Last data filed at 1/7/2020 1511  Gross per 24 hour   Intake 1470 ml   Output 2350 ml   Net -880 ml             No results for input(s): CPK, CKMB, TROIQ in the last 72 hours.   Recent Labs     01/07/20 0258 01/06/20 0019 01/05/20 0429   * 131* 130*   K 4.8 4.9 4.6   CO2 25 25 26   BUN 26* 23* 21*   CREA 1.04 1.11 1.09   * 104* 102*   MG 1.8 1.8 1.7   WBC 6.4 6.8 6.2   HGB 8.7* 8.5* 8.9*   HCT 28.4* 28.3* 29.3*   * 123* 127*     Recent Labs     01/07/20 0258 01/06/20 0019 01/05/20 0429   INR 1.4* 1.3* 1.3*   PTP 14.2* 13.4* 13.1*   APTT 33.3* 27.3 30.7     No lab exists for component: PBNP        Current Facility-Administered Medications:     midodrine (PROAMITINE) tablet 5 mg, 5 mg, Oral, TID WITH MEALS, Jeremie Herrera NP, 5 mg at 01/07/20 1317    warfarin (COUMADIN) tablet 5 mg, 5 mg, Oral, ONCE, Delia Herrera NP    hydrocortisone (CORTAID) 1 % cream, , Topical, TID PRN, Asuncion Limon MD    sildenafil (pulm.hypertension) (REVATIO) tablet 20 mg, 20 mg, Oral, TID, Mounika Altman MD, 20 mg at 01/07/20 0907    thiamine HCL (B-1) tablet 100 mg, 100 mg, Oral, DAILY, Beverly ACUNA NP, 100 mg at 01/07/20 0002    venlafaxine-SR (EFFEXOR-XR) capsule 75 mg, 75 mg, Oral, DAILY WITH BREAKFAST, Beverly Ramos NP, 75 mg at 01/07/20 0906    levoFLOXacin (LEVAQUIN) 750 mg in D5W IVPB, 750 mg, IntraVENous, Q24H, Catalina Mccullough MD, Last Rate: 100 mL/hr at 01/06/20 1500, 750 mg at 01/06/20 1500    cefepime (MAXIPIME) 2 g in 0.9% sodium chloride (MBP/ADV) 100 mL, 2 g, IntraVENous, Q8H, Shannan Olivares MD, Last Rate: 200 mL/hr at 01/07/20 0906, 2 g at 01/07/20 0906    oxybutynin (DITROPAN) tablet 5 mg, 5 mg, Oral, Q6H PRN, Shana Sims NP, 5 mg at 01/01/20 1204    magnesium oxide (MAG-OX) tablet 800 mg, 800 mg, Oral, BID, Beverly Dill, NP, 800 mg at 01/07/20 0906    albumin human 5% (BUMINATE) solution 12.5 g, 12.5 g, IntraVENous, DIALYSIS PRN, Logan Kincaid MD    lidocaine (URO-JET/ GLYDO) 2 % jelly, , Urethral, PRN, Nichelle Altman MD    epoetin yvonne-epbx (RETACRIT) injection 20,000 Units, 20,000 Units, SubCUTAneous, Q MON, WED & FRI, Logan Kincaid MD, 20,000 Units at 01/06/20 2155    albuterol-ipratropium (DUO-NEB) 2.5 MG-0.5 MG/3 ML, 3 mL, Nebulization, Q6H PRN, Alexa Lopez MD, 3 mL at 12/25/19 1407    therapeutic multivitamin (THERAGRAN) tablet 1 Tab, 1 Tab, Oral, DAILY, Shana Sims NP, 1 Tab at 01/07/20 0906    alteplase (CATHFLO) 1 mg in sterile water (preservative free) 1 mL injection, 1 mg, InterCATHeter, PRN, Mounika Mcpherson MD, 1 mg at 12/31/19 1121    finasteride (PROSCAR) tablet 5 mg, 5 mg, Oral, DAILY, Maryjo Hodgkin, MD, 5 mg at 01/07/20 9405    clonazePAM (KlonoPIN) tablet 0.5 mg, 0.5 mg, Oral, TID PRN, Mounika Mcpherson MD, 0.5 mg at 01/06/20 2154    diphenhydrAMINE (BENADRYL) capsule 25 mg, 25 mg, Oral, QHS PRN, Soraya Herrera NP, 25 mg at 01/05/20 2325    octreotide (SANDOSTATIN) injection 25 mcg, 25 mcg, SubCUTAneous, TID, Sharon, Patrick LOPEZ NP, 25 mcg at 01/07/20 0900    benzocaine-menthol (CEPACOL) lozenge 1 Lozenge, 1 Lozenge, Mucous Membrane, PRN, Polliard, Soraya Fujisawa, NP    digoxin (LANOXIN) tablet 0.0625 mg, 62.5 mcg, Oral, Q MON, WED & FRI, Pollkennedyd, Patrick Macias T, NP, 0.0625 mg at 01/06/20 2154    pantoprazole (PROTONIX) tablet 40 mg, 40 mg, Oral, ACB, Pollkennedyd, Patrick Macias T, NP, 40 mg at 01/07/20 0720    allopurinol (ZYLOPRIM) tablet 100 mg, 100 mg, Oral, DAILY, Sharon, Patrick LOPEZ, NP, 100 mg at 01/07/20 0906    arformoterol (BROVANA) neb solution 15 mcg, 15 mcg, Nebulization, BID RT, 15 mcg at 01/07/20 0817 **AND** budesonide (PULMICORT) 500 mcg/2 ml nebulizer suspension, 500 mcg, Nebulization, BID RT, Soraya Herrera, NP, 500 mcg at 01/07/20 0817    artificial saliva (MOUTH KOTE) 1 Spray, 1 Spray, Oral, PRN, Preston Herrera NP, 1 Spray at 12/12/19 1452    lactobac ac& pc-s.therm-b.anim (TIAN Q/RISAQUAD), 1 Cap, Oral, DAILY, Carmen Baca MD, 1 Cap at 01/07/20 0906    oxyCODONE IR (ROXICODONE) tablet 5 mg, 5 mg, Oral, Q6H PRN, Aurora Ann MD, 5 mg at 12/30/19 0405    tamsulosin (FLOMAX) capsule 0.4 mg, 0.4 mg, Oral, DAILY, Rudy Carmichael MD, 0.4 mg at 01/07/20 0906    Warfarin MD/NP dosing, , Other, PRN, Elijah Altman MD    sodium chloride (NS) flush 5-40 mL, 5-40 mL, IntraVENous, Q8H, Nichelle PAULINO MD, 10 mL at 01/07/20 0721    sodium chloride (NS) flush 5-40 mL, 5-40 mL, IntraVENous, PRN, Aurora Ann MD, 10 mL at 01/02/20 2342    acetaminophen (TYLENOL) tablet 650 mg, 650 mg, Oral, Q6H PRN, Aurora Ann MD, 650 mg at 01/02/20 2213    naloxone (NARCAN) injection 0.4 mg, 0.4 mg, IntraVENous, PRN, Aurora Ann MD    [facility]etron [facility]) injection 4 mg, 4 mg, IntraVENous, Q4H PRN, Aurora Ann MD, 4 mg at 01/03/20 1748    bisacodyl (DULCOLAX) tablet 5 mg, 5 mg, Oral, DAILY PRN, Aurora Ann MD, 5 mg at 12/22/19 1533    sucralfate (CARAFATE) tablet 1 g, 1 g, Oral, AC&HS, Aurora Ann MD, 1 g at 01/07/20 1317    influenza vaccine 2019-20 (6 mos+)(PF) (FLUARIX/FLULAVAL/FLUZONE QUAD) injection 0.5 mL, 0.5 mL, IntraMUSCular, PRIOR TO DISCHARGE, Elijah Altman MD    hydrALAZINE (APRESOLINE) 20 mg/mL injection 20 mg, 20 mg, IntraVENous, Q6H PRN, Shana Sims NP, 20 mg at 12/10/19 0541    dextrose 10 % infusion 125-250 mL, 125-250 mL, IntraVENous, PRN, Mounika Nelson MD, Stopped at 11/18/19 0700    A/P     S/P LVAD - good flows  Need for anti-coagulation - coumadin  DM - insulin  RV dysfunction - milrinone, diuretics      Risk of morbidity and mortality - high  Medical decision making - high complexity    Signed By: Joseph Lindquist MD

## 2020-01-08 NOTE — WOUND CARE
Wound Care Note:     Re-consulted by silvano request for sternal wound    Chart shows:  Admitted for acute decompensated heart failure s/p right axillary exploration with construction of 10 mm chimney graft used for introduction of a percutaneous left ventricular assist device with insertion of same 10/30/19 s/p cystoscopy 11/13/19 and left brachial artery thrombectomy 11/25/19   Past Medical History:   Diagnosis Date    Degenerative disc disease, lumbar     [facility] ([facility]     High cholesterol     Hypertension     Paroxysmal atrial fibrillation ([facility] 4/2/2019    Spinal stenosis      WBC = 5.3 on 1/8/20    Assessment:   Patient is A&O x 4, communicative, continent with some assistance needed in repositioning. Bed: Total Care  Patient has a condom catheter in place. Diet: Regular with snacks and nutritional supplements  Patient reports no pain. Bilateral heels and sacral skin intact and without erythema. Bilateral heels skin intact with blanchable erythema, dependent rubor. 1. Distal sternal incision measures 1.9 cm x 1.5 cm x 0.4 cm, wound be is covered with slough, moderate amount of serous with hint of green drainage, david-wound intact, wound edges are open. Iodosorb, 2 x 2 and 4 x 4 applied. Spoke with Dr. Jed Singh and Юлия Schwarz, NP, notified of light green drainage; wound care orders and wound culture obtained. When wound cleansed for culture a small area of hardware visible, approximately 0.1 cm x 0.1 cm. Patient up in chair. Recommendations:    Sternal wound- Every other day cleanse with normal saline, wipe wound bed clean and dry, apply a thick coat of Iodosorb (located in 5E back PAR room), loosely fill wound with 2 x 2, cover with 4 x 4 and secure with tape. Skin Care & Pressure Prevention:  Minimize layers of linen/pads under patient to optimize support surface.     Turn/reposition approximately every 2 hours and offload heels.   Manage incontinence / promote continence   Nourishing Skin Cream to dry skin, minimize use of briefs when able    Discussed above plan with patient & Eun Garcia RN    Transition of Care: Plan to follow as needed while admitted to hospital.    Veto Shone" Aureliano Coward, BSN, RN, [facility], [facility].  office [phone]  pager [phone] or call  to page

## 2020-01-08 NOTE — PROGRESS NOTES
Transitions of Care Plan:     Patient is progressing with therapy; medical management continues; patient remains in [facility]     Disposition:  [address] spoke with patient and his wife, Ivette Nephew, at bedside to discuss transitions of care plan. CM offered [place] [facility] for Inpatient Rehab facilities that accept LVADs. Patient and spouse agreeable to [facility] for referral and inpatient rehab. FOC signed and placed on hard chart. Time for transition to inpatient rehab still not determined today. CM will send referral to [facility] once updated notes from therapy session today are available. CM will continue to follow.     Asya Sethi, MPH

## 2020-01-08 NOTE — PROGRESS NOTES
[facility] VAD/Heart Failure Progress Note    Admit Date: 10/25/2019  POD:  44 Days Post-Op    Procedure:  Procedure(s):  LEFT BRACHIAL THROMBECTOMY        Subjective:   Mild dyspnea, fatigue, and weakness; working on ambulation      Objective:   Vitals:  Blood pressure 91/72, pulse 88, temperature 97.8 °F (36.6 °C), resp. rate 18, height 6' 2\" (1.88 m), weight 167 lb 5.3 oz (75.9 kg), SpO2 95 %.   Temp (24hrs), Av.9 °F (36.6 °C), Min:97.6 °F (36.4 °C), Max:98.1 °F (36.7 °C)    Hemodynamics:   CO: CO (l/min): 5.8 l/min   CI: CI (l/min/m2): 2.8 l/min/m2   CVP: CVP (mmHg): 4 mmHg (19 1645)   SVR: SVR (dyne*sec)/cm5: 1080 (dyne*sec)/cm5 (19 7042)   PAP Systolic: PAP Systolic: 34 ( 1418)   PAP Diastolic: PAP Diastolic: 13 (72/28/15 4157)   PVR:     SV02: SVO2 (%): 67 % (19 1300)   SCV02: SCVO2 (%): 75 % (10/29/19 1900)    VAD Interrogation: LVAD (Heartmate)  Pump Speed (RPM): 6600  Pump Flow (LPM): 6.2  Chatter in Lines: No  PI (Pulsitility Index): 3.2  Power: 6  MAP: 90   Test: Yes  Back Up  at Bedside & Labeled: Yes  Power Module Test: Yes  Driveline Site Care: No  Driveline Dressing: Clean, Dry, and Intact    EKG/Rhythm:      Extubation Date / Time:     CT Output:     Ventilator:  Ventilator Volumes  Vt Set (ml): 550 ml (19 08)  Vt Exhaled (Machine Breath) (ml): 639 ml (19 08)  Vt Spont (ml): 437 ml (19 1035)  Ve Observed (l/min): 7.25 l/min (19 1035)    Oxygen Therapy:  Oxygen Therapy  O2 Sat (%): 95 % (20 1057)  Pulse via Oximetry: 83 beats per minute (20 1047)  O2 Device: Room air (20 1057)  PEEP/CPAP (cm H2O): 1 cm H20 (20 1013)  O2 Flow Rate (L/min): 1 l/min (20 0400)  FIO2 (%): 21 % (20 1047)    CXR:    Admission Weight: Last Weight   Weight: 192 lb 10.9 oz (87.4 kg) Weight: 167 lb 5.3 oz (75.9 kg)     Intake / Output / Drain:  Current Shift: 701 -  1900  In: 700 [P.O.:600; I.V.:100]  Out: 650 [Urine:650]  Last 24 hrs.:     Intake/Output Summary (Last 24 hours) at 1/8/2020 1401  Last data filed at 1/8/2020 1115  Gross per 24 hour   Intake 1500 ml   Output 2825 ml   Net -1325 ml           No results for input(s): CPK, CKMB, TROIQ in the last 72 hours.   Recent Labs     01/08/20  0301 01/07/20  0258 01/06/20  0019   * 130* 131*   K 4.6 4.8 4.9   CO2 23 25 25   BUN 23* 26* 23*   CREA 1.08 1.04 1.11   * 114* 104*   MG 1.8 1.8 1.8   WBC 5.3 6.4 6.8   HGB 9.2* 8.7* 8.5*   HCT 29.9* 28.4* 28.3*   * 138* 123*     Recent Labs     01/08/20  0301 01/07/20  0258 01/06/20  0019   INR 1.5* 1.4* 1.3*   PTP 15.1* 14.2* 13.4*   APTT 33.7* 33.3* 27.3     No lab exists for component: PBNP        Current Facility-Administered Medications:     midodrine (PROAMITINE) tablet 5 mg, 5 mg, Oral, TID WITH MEALS, Polliard, Lizzie Halo T, NP, 5 mg at 01/08/20 1127    hydrocortisone (CORTAID) 1 % cream, , Topical, TID PRN, Patience Mounika Kim MD    sildenafil (pulm.hypertension) (REVATIO) tablet 20 mg, 20 mg, Oral, TID, Mounika Altman MD, 20 mg at 01/08/20 8482    thiamine HCL (B-1) tablet 100 mg, 100 mg, Oral, DAILY, Media Green E, NP, 100 mg at 01/08/20 1893    venlafaxine-SR (EFFEXOR-XR) capsule 75 mg, 75 mg, Oral, DAILY WITH BREAKFAST, Media Green E, NP, 75 mg at 01/08/20 6606    levoFLOXacin (LEVAQUIN) 750 mg in D5W IVPB, 750 mg, IntraVENous, Q24H, Ester Olivares MD, Last Rate: 100 mL/hr at 01/07/20 1728, 750 mg at 01/07/20 1728    cefepime (MAXIPIME) 2 g in 0.9% sodium chloride (MBP/ADV) 100 mL, 2 g, IntraVENous, Q8H, Ester Olivares MD, Last Rate: 200 mL/hr at 01/08/20 0811, 2 g at 01/08/20 0811    oxybutynin (DITROPAN) tablet 5 mg, 5 mg, Oral, Q6H PRN, Shana Sims B, NP, 5 mg at 01/01/20 1204    magnesium oxide (MAG-OX) tablet 800 mg, 800 mg, Oral, BID, Eliza Green E, NP, 800 mg at 01/08/20 2828    albumin human 5% (BUMINATE) solution 12.5 g, 12.5 g, IntraVENous, DIALYSIS PRN, Khurram Moore MD    lidocaine (URO-JET/ GLYDO) 2 % jelly, , Urethral, PRN, Mary Ellen Altman MD    epoetin yvonne-epbx (RETACRIT) injection 20,000 Units, 20,000 Units, SubCUTAneous, Q MON, WED & FRI, Logan Kincaid MD, 20,000 Units at 01/06/20 2155    albuterol-ipratropium (DUO-NEB) 2.5 MG-0.5 MG/3 ML, 3 mL, Nebulization, Q6H PRN, Phil Gaytan MD, 3 mL at 12/25/19 1407    therapeutic multivitamin (THERAGRAN) tablet 1 Tab, 1 Tab, Oral, DAILY, Levi Shana B, NP, 1 Tab at 01/08/20 8760    alteplase (CATHFLO) 1 mg in sterile water (preservative free) 1 mL injection, 1 mg, InterCATHeter, PRN, Mounika Mclean MD, 1 mg at 01/08/20 0919    finasteride (PROSCAR) tablet 5 mg, 5 mg, Oral, DAILY, Tosha Merino MD, 5 mg at 01/08/20 8314    clonazePAM (KlonoPIN) tablet 0.5 mg, 0.5 mg, Oral, TID PRN, Mounika Mclean MD, 0.5 mg at 01/07/20 2022    diphenhydrAMINE (BENADRYL) capsule 25 mg, 25 mg, Oral, QHS PRN, Bipin Herrera NP, 25 mg at 01/05/20 2325    octreotide (SANDOSTATIN) injection 25 mcg, 25 mcg, SubCUTAneous, TID, Bipin Herrera NP, 25 mcg at 01/08/20 0518    benzocaine-menthol (CEPACOL) lozenge 1 Lozenge, 1 Lozenge, Mucous Membrane, PRN, PollBipin capone NP    digoxin (LANOXIN) tablet 0.0625 mg, 62.5 mcg, Oral, Q MON, WED & FRI, Polliard, Oksana Escort T, NP, 0.0625 mg at 01/06/20 2154    pantoprazole (PROTONIX) tablet 40 mg, 40 mg, Oral, ACB, Polliard, Oksana Escort T, NP, 40 mg at 01/08/20 0640    allopurinol (ZYLOPRIM) tablet 100 mg, 100 mg, Oral, DAILY, Pollkennedyd, Oksana Escort T, NP, 100 mg at 01/08/20 0812    arformoterol (BROVANA) neb solution 15 mcg, 15 mcg, Nebulization, BID RT, 15 mcg at 01/08/20 1047 **AND** budesonide (PULMICORT) 500 mcg/2 ml nebulizer suspension, 500 mcg, Nebulization, BID RT, Sharon Rin T, NP, 500 mcg at 01/08/20 1047    artificial saliva (MOUTH KOTE) 1 Spray, 1 Spray, Oral, PRN, Bipin Herrera, NP, 1 Spray at 12/12/19 1452    lactobac ac& pc-s.therm-b.anim (TIAN Q/RISAQUAD), 1 Cap, Oral, DAILY, Geovanna Lee MD, 1 Cap at 01/08/20 7070    oxyCODONE IR (ROXICODONE) tablet 5 mg, 5 mg, Oral, Q6H PRN, Madelyn Lantigua MD, 5 mg at 12/30/19 0405    tamsulosin (FLOMAX) capsule 0.4 mg, 0.4 mg, Oral, DAILY, Aleen Councilman, MD, 0.4 mg at 01/08/20 6553    Warfarin MD/NP dosing, , Other, PRN, Amanda Altman MD    sodium chloride (NS) flush 5-40 mL, 5-40 mL, IntraVENous, Q8H, Vin PAULINO MD, 30 mL at 01/08/20 1400    sodium chloride (NS) flush 5-40 mL, 5-40 mL, IntraVENous, PRN, Madelyn Lantigua MD, 10 mL at 01/02/20 2342    acetaminophen (TYLENOL) tablet 650 mg, 650 mg, Oral, Q6H PRN, Madelyn Lantigua MD, 650 mg at 01/02/20 2213    naloxone (NARCAN) injection 0.4 mg, 0.4 mg, IntraVENous, PRN, Madelyn Lantigua MD    ondansetron [facility]) injection 4 mg, 4 mg, IntraVENous, Q4H PRN, Madelyn Lantigua MD, 4 mg at 01/03/20 1748    bisacodyl (DULCOLAX) tablet 5 mg, 5 mg, Oral, DAILY PRN, Madelyn Lantigua MD, 5 mg at 12/22/19 1533    sucralfate (CARAFATE) tablet 1 g, 1 g, Oral, AC&HS, Madelyn Lantigua MD, 1 g at 01/08/20 1127    influenza vaccine 2019-20 (6 mos+)(PF) (FLUARIX/FLULAVAL/FLUZONE QUAD) injection 0.5 mL, 0.5 mL, IntraMUSCular, PRIOR TO DISCHARGE, Amanda Altman MD    hydrALAZINE (APRESOLINE) 20 mg/mL injection 20 mg, 20 mg, IntraVENous, Q6H PRN, Shana Sims NP, 20 mg at 12/10/19 0541    dextrose 10 % infusion 125-250 mL, 125-250 mL, IntraVENous, PRN, Mounika Saravia MD, Stopped at 11/18/19 0700    A/P     S/P LVAD - good flows  Need for anti-coagulation - coumadin  DM - insulin  RV dysfunction - milrinone, diuretics      Risk of morbidity and mortality - high  Medical decision making - high complexity       Signed By: Usama Cordon MD

## 2020-01-08 NOTE — PROGRESS NOTES
0730:  Bedside shift change report given to Lydia Escudero RN (oncoming nurse) by My Kuo RN (offgoing nurse). Report included the following information SBAR, Kardex, Procedure Summary, Intake/Output, MAR, and Recent Results. 0800:  Patient up in chair for breakfast, states \"I feel dizzy but not as bad as usual\". 0919:  PICC line flushes but has no blood return, cathflo instilled. 0930:  Patient assisted to [facility] with little to no dizziness. 1050:  Blood return obtained from PICC, pre medication labs drawn and sent for Cortrosyn. Cortrosyn given. 1230:  Patient up to [facility], declines dizziness. 1245:  Patient working with PT, up to chair for lunch. 1500:  Patient returned to bed, shaving self. 1645:  Patient up to bedside commode and then to chair for dinner. 1850:  Patient assisted back to bed, driveline dressing change performed under sterile technique. 1930:  Bedside shift change report given to Ivonne Payan and [employer], RN (oncoming nurse) by Lydia Escudero RN (offgoing nurse). Report included the following information SBAR, Kardex, Procedure Summary, Intake/Output, MAR and Recent Results. Problem: Falls - Risk of  Goal: *Absence of Falls  Description  Document Donato Barrieenzo Fall Risk and appropriate interventions in the flowsheet.   Outcome: Progressing Towards Goal  Note: Fall Risk Interventions:  Mobility Interventions: Patient to call before getting OOB    Mentation Interventions: Adequate sleep, hydration, pain control, Door open when patient unattended, Familiar objects from home, Gait belt with transfers/ambulation, Increase mobility, More frequent rounding, Reorient patient, Room close to nurse's station, Toileting rounds, Update white board    Medication Interventions: Teach patient to arise slowly, Utilize gait belt for transfers/ambulation, Patient to call before getting OOB    Elimination Interventions: Call light in reach, Patient to call for help with toileting needs, Toileting schedule/hourly rounds    History of Falls Interventions: Consult care management for discharge planning, Evaluate medications/consider consulting pharmacy, Room close to nurse's station         Problem: Pain  Goal: *Control of Pain  Outcome: Progressing Towards Goal     Problem: Pressure Injury - Risk of  Goal: *Prevention of pressure injury  Description  Document Mich Scale and appropriate interventions in the flowsheet.   Outcome: Progressing Towards Goal  Note: Pressure Injury Interventions:  Sensory Interventions: Assess changes in LOC, Chair cushion, Float heels, Keep linens dry and wrinkle-free, Pressure redistribution bed/mattress (bed type)    Moisture Interventions: Absorbent underpads, Apply protective barrier, creams and emollients, Assess need for specialty bed, Check for incontinence Q2 hours and as needed, Internal/External urinary devices, Maintain skin hydration (lotion/cream), Minimize layers, Moisture barrier    Activity Interventions: Increase time out of bed, Pressure redistribution bed/mattress(bed type), PT/OT evaluation    Mobility Interventions: HOB 30 degrees or less, Pressure redistribution bed/mattress (bed type), PT/OT evaluation    Nutrition Interventions: Document food/fluid/supplement intake, Discuss nutritional consult with provider, Offer support with meals,snacks and hydration    Friction and Shear Interventions: Apply protective barrier, creams and emollients, Feet elevated on foot rest, Foam dressings/transparent film/skin sealants, Lift sheet, Transferring/repositioning devices, Trapeze to reposition                Problem: Heart Failure: Discharge Outcomes  Goal: *Demonstrates ability to perform prescribed activity without shortness of breath or discomfort  Outcome: Progressing Towards Goal     Problem: LVAD Post-op Day 15 to Discharge  Goal: Medications  Outcome: Progressing Towards Goal     Problem: Impaired Skin Integrity/Pressure Injury Treatment  Goal: *Improvement of Existing Pressure Injury  Outcome: Progressing Towards Goal

## 2020-01-08 NOTE — PROGRESS NOTES
ID Progress Note  2020    Subjective:     Doing ok, seems to be feeling stronger--walked some today    Objective:     Antibiotics:  1. Levaquin  2. Cefepime   3. Rifampin   4. Fluconazole  5. Vancomycin    6. Cefazolin   7. Zosyn       Vitals:   Visit Vitals  BP 91/72 (BP 1 Location: Left arm, BP Patient Position: At rest)   Pulse 90   Temp 98 °F (36.7 °C)   Resp 20   Ht 6' 2\" (1.88 m)   Wt 75.9 kg (167 lb 5.3 oz)   SpO2 96%   BMI 21.48 kg/m²        Tmax:  Temp (24hrs), Av.9 °F (36.6 °C), Min:97.6 °F (36.4 °C), Max:98.1 °F (36.7 °C)      Exam:  Lungs:  clear to auscultation bilaterally  Heart:  regular rate and rhythm  Abdomen:  soft, non-tender. Bowel sounds normal. No masses,  no organomegaly  Urine in neal is grossly clear      Labs:      Recent Labs     20  0301 20  0258 20  0019   WBC 5.3 6.4 6.8   HGB 9.2* 8.7* 8.5*   * 138* 123*   BUN 23* 26* 23*   CREA 1.08 1.04 1.11   SGOT 18 18 18    111 118*   TBILI 0.6 0.5 0.7       Cultures:     No results found for: SDES  Lab Results   Component Value Date/Time    Culture result: NO GROWTH AFTER 16 HOURS 2020 11:53 AM    Culture result: LIGHT NORMAL RESPIRATORY TIAN 2019 04:18 PM    Culture result: (A) 2019 03:24 PM     PSEUDOMONAS AERUGINOSA ISOLATED FROM 2 OF 4 BOTTLES DRAWN, EACH FROM A DIFFERENT SITE. .. RFA AND LEFT WRIST    Culture result: (A) 2019 03:24 PM     PRELIMINARY REPORT OF GRAM NEGATIVE RODS GROWING IN 1 OF 4 BOTTLES DRAWN CALLED TO AND READ BACK BY Natalia Singh RN AT 8137 ON 20 RB    Culture result: (A) 2019 03:24 PM     PRELIMINARY REPORT OF GRAM NEGATIVE RODS GROWING IN 2ND OF 4 BOTTLES DRAWN (DIFFERENT SITE=L WRIST) CALLED TO AND READ BACK BY Natalia Singh RN AT 15:51 ON 20 BY [facility]. Culture result: REMAINING BOTTLE(S) HAS/HAVE NO GROWTH IN 5 DAYS 2019 03:24 PM       Radiology:     Line/Insert Date:           Assessment:     1.  UTI--Serratia (2 biotypes) from culture and small amount of Enterococcus--now with Pseudomonas from culture of urine and blood  2. CHF/cardiomyopathy  3. ?aspiration event(s)  4. Renal insufficiency  5. Respiratory difficulties    Objective:     1. Continue current therapy  2.  Presence of LVAD in face of bacteremia may require longer course of therapy, or at least PO Rx for a time      Tobi Hollingsworth MD

## 2020-01-08 NOTE — PROGRESS NOTES
[facility] in [place], [place]  Inpatient Progress Note      Patient name: Vicente Gordillo  Patient : 1950  Patient MRN: [id number]  Attending MD: Diamond Toribio MD  Date of service: 20    Chief Complaint:   Janet Erazo azucena LVAD implant     HPI: Sona Kiser is a 71y.o. year old pleasant white male with a history of HTN, HLD, JOSE, CAD s/p cardiac arrest VFib s/p CABG () c/b sternal would infection and sternectomy, ischemic cardiomyopathy LVEF 15-20%, s/p ICD and with LBBB. He was admitted with acute on chronic systolic heart failure with massive volume overload > 20 lbs, in the setting of atrial fibrillation s/p failed DCCV and underwent DCCV and RHC on .  S/p BiVICD on 19 with Dr. Sesar Garibay. He was discharged home home on IV milrinone on 19. [facility] has been followed closely by Dr. Gurdeep Ramos and the [facility].     Mr. Kiser was admitted for acute on chronic systolic heart failure. He underwent implantation of Impella 5.0 due to CS and has completed his LVAD evaluation. [facility] meets criteria for implant of HM3 as DT. He was NYHA Class IV prior to implant of Impella 5.0, has LVEF < 15%, was intolerant of GDMT due to symptomatic hypotension and renal dysfunction. He remains dependent on temporary MCS support. RHC  revealed compensated hemodynamics on Impella. His renal function has improved dramatically with improvement in his hemodynamics. He is not a suitable transplant candidate due to single kidney, sternectomy, debility, and frailty. He was reviewed by Rajeev Darnell and felt to be a high risk heart transplant candidate due to multiple co-morbidities as well as the afore mentioned conditions.  He remained in the CCU and underwent a HM 3 implant as DT on .   He was weaned off of pressors and transferred to [place] [address] where he continues to undergo PT/OT and hemodynamic optimization.       24Hr Events:   Remains dizzy in AM  Ambulated to nurses station MAPs stable     Procedure:  Procedure(s):  REMOVAL TEMPORARY LEFT VENTRICULAR ASSIST DEVICE, IMPLANTATION OF LEFT VENTRICULAR ASSIST DEVICE PERMANENT (VAD), ECC, JACQUE, EPI AORTIC US BY DR Milvia Cervantes.     POD:  50     Impression / Plan:   Heart Failure Status: NYHA Class IV, improved to NYHA Class III    Chronic systolic heart failure due to ICM, NYHA Class IV, EF < 15%, cardiogenic shock bridged with Impella 5.0 support to HM 3 implant   S/p Impella removal and LVAD implant 11/18/19  RPMs stable at 6600   Multiple PI events on interrogation   Continue Sildenafil 20 mg PO TID  Intolerant of BB due to RV failure  Intolerant of ACEi/ARB/ARNI/AA due to JUAN J on CKD 3  Discontinue spironolactone d/t IVVD   Continue midodrine 5 mg po TID for orthostatic hypotension  Strict I/O, daily weights  Continue LVAD education   Dressing change frequency three times weekly, Sutures removed 12/26/19  Delayed skin healing on bottom portion of sternotomy- fluid culture and Dr. Kalen Olmedo to eval in AM     Bacteremia - Pseudomonas  Blood cultures (12/31/19) 2/4 bottles +Pseudomonas & 2/4 bottles GNRs    Repeat BC NGTD  On IV Cefepime and Levaquin   Follow procalcitonin and lactic acid levels - both improving    Orthostatic Hypotension  Midodrine to 5 mg PO TID  Stim test in progress    Generalized myoclonus   Improved   Appreciate Neurology input  Discontinued Keppra due to dizziness   Head CT negative for acute process  EEG suggestive of mild generalized encephalopathic process, possibly related to underlying structural brain injury vs metabolic abnormality  Monitor closely      Anticoagulation for LVAD, INR goal 1.5-2  Goal decreased d/t persistent hematuria   No ASA d/t hematuria  INR 1.5  Coumadin - 5 mg tonight    Epistaxis  Resolved      Acute Respiratory Failure post op  Improved with aggressive diuresis  Off supplemental O2  Pulmonary hygiene   Cont home CPAP- must use while sleeping   Schedule PET CT prior to discharge    Acute on CKD 3, atrophic left kidney  Appreciate Nephrology assistance  Watch Cr, stable  Hold diuretics   Avoid nephrotoxins, trend labs      CAD s/p CABG   Off ASA d/t hematuria  No BB d/t RV failure  Hold statin due to recent hepatic failure   LHC performed 11/13 - low likelihood of benefit from revascularization      Hx of VF arrest s/p BiV-ICD  No recent shocks  Keep K > 4 and Mg > 2   Mag ox 800 mg po BID  ICD reprogrammed to reduce diaphragmatic stimulation     PAF   Dig level 0.4-cont dig (62.5 mcg qMWF)  No BB due to RV failure   Repeat TFTs WNL     Hx of gout  Uric acid WNL    Acute blood loss anemia  Likely due to hematuria  Cont octreotide 25 mcg SQ TID   Fecal occult 12/14 negative, positive on 12/17  Appreciate urology recommendations  Hematuria improved  Monitor for now     Urinary retention, c/b serratia UTI and hematuria  Appreciate Urology consult   Repeat UA with cx negative 12/15   Cystoscopy performed 11/13 shows catheter induced cystitis   Pseudomonas aeruginosa positive UA culture (12/31/19) - on Cefepmine    Malnutrition   Eating better  Prealbumin weekly - 13.3 on 1/5  Appreciate Nutritionist assistance  Diet as tolerated, encourage food from home, protein shakes  Intolerant of mirtazapine d/t tremors, confusion   Cont MVI add thiamine 100mg daily   Hold on DHT placement for now     COPD   Appreciate Pulmonology assistance   Continue nebs PRN       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  Appreciate neurology assistance      Hx of sternal wound infection, sternectomy  Sternum closed post op- monitor     Vocal cord paralysis  Improved  ENT recs appreciated  Speech therapy following - appreciate input    Anxiety  Klonipin 0.5 mg TID prn anxiety    Depression  Continue Effexor XR 75mg daily  Monitor rhythm strips for prolonged QTc     Debility  Continue PT/OT     Bladder spasms  Received pyridium 100mg x4 doses  Oxybutynin 5mg Q6hr prn      Ppx  Protonix   PT/OT   +daily BM   PICC placed 11/22/19      Dispo:  Remain in CVSU at this time  Will need IP rehab. Not ready for discharge at this time        Patient seen and examined. Data and note reviewed. I have discussed and agree with the plans as noted. 71year old male with a history of ICM, LVAD as DT, whose course has been complicated by RV failure, orthostatic hypotension, and urinary retention with pseudomonas UTI and bacteremia. He is improving on IV antibiotics but remains dizzy with sitting or standing. Cortysn stimulation test in progress to assess for adrenal insufficiency. Thank you for allowing us to participate in your patient's care. Mounika Lynn MD, [facility], [facility]  Chief of Cardiology, [address] [place] [address]  Office [phone]  Fax [phone]        LVAD INTERROGATION:  Device interrogated in person  Device function normal, normal flow, multiple PI events    LVAD   Pump Speed (RPM): 6000  Pump Flow (LPM): 6.2  MAP: 78  PI (Pulsitility Index): 3.3  Power: 6.1   Test: Yes  Back Up  at Bedside & Labeled: Yes  Power Module Test: Yes  Driveline Site Care: No  Driveline Dressing: Clean, Dry, and Intact  Outpatient: No  MAP in Therapeutic Range (Outpatient): Yes  Testing  Alarms Reviewed: Yes  Back up SC speed: 6600  Back up Low Speed Limit: 6000  Emergency Equipment with Patient?: Yes  Emergency procedures reviewed?: Yes  Drive line site inspected?: No  Drive line intergrity inspected?: Yes  Drive line dressing changed?: No    PHYSICAL EXAM:  Visit Vitals  BP 91/72 (BP 1 Location: Left arm, BP Patient Position: At rest)   Pulse 90   Temp 98 °F (36.7 °C)   Resp 20   Ht 6' 2\" (1.88 m)   Wt 167 lb 5.3 oz (75.9 kg)   SpO2 96%   BMI 21.48 kg/m²       Physical Exam   Constitutional: He is oriented to person, place, and time. He appears well-developed. He appears cachectic. No distress. HENT:   Head: Normocephalic. Neck: Normal range of motion. Neck supple. No JVD present. Cardiovascular: Normal rate, regular rhythm and normal heart sounds. + VAD hum    Pulmonary/Chest: Effort normal and breath sounds normal. No tachypnea. No respiratory distress. Abdominal: Soft. Bowel sounds are normal. He exhibits no distension. Musculoskeletal: Normal range of motion. General: No edema. Neurological: He is alert and oriented to person, place, and time. Skin: Skin is warm and dry. Psychiatric: He has a normal mood and affect. His speech is normal and behavior is normal.   Nursing note and vitals reviewed. REVIEW OF SYSTEMS:  Review of Systems   Constitutional: Positive for malaise/fatigue. Negative for chills and fever. HENT: Positive for hearing loss. Negative for nosebleeds. Eyes: Negative. Respiratory: Negative for cough and shortness of breath. Mild dyspnea   Cardiovascular: Negative for chest pain, palpitations, orthopnea and leg swelling. Orthostatic   Gastrointestinal: Negative for heartburn, nausea and vomiting. Genitourinary: Negative for dysuria and hematuria. Musculoskeletal: Negative. Neurological: Positive for dizziness and weakness. Negative for headaches. Mild dizziness - improving    Endo/Heme/Allergies: Does not bruise/bleed easily.        PAST MEDICAL HISTORY:  Past Medical History:   Diagnosis Date    Degenerative disc disease, lumbar     Heart failure ([address]     High cholesterol     Hypertension     Paroxysmal atrial fibrillation ([address] 4/2/2019    Spinal stenosis        PAST SURGICAL HISTORY:  Past Surgical History:   Procedure Laterality Date    COLONOSCOPY Left 11/11/2019    COLONOSCOPY at bedside performed by Mari Hern[facility]dez MD at [facility] ENDOSCOPY    HX APPENDECTOMY      HX CORONARY ARTERY BYPASS GRAFT      triple    HX HERNIA REPAIR      HX IMPLANTABLE CARDIOVERTER DEFIBRILLATOR      VT CARDIOVERSION ELECTIVE ARRHYTHMIA EXTERNAL N/A 6/10/2019    EP CARDIOVERSION performed by Patrice Hodge MD at [address] [facility] CATH LAB    VT CARDIOVERSION ELECTIVE ARRHYTHMIA EXTERNAL N/A 2019    EP CARDIOVERSION performed by Diego Juarez MD at [address] [facility] [address], [facility] [address] CATH LAB    VT INSJ/RPLCMT PERM DFB W/TRNSVNS LDS 1/DUAL CHMBR N/A 2019    INSERT ICD BIV MULTI performed by Teofilo Pugh MD at [address], [facility] CATH LAB    VT TCAT IMPL WRLS P-ART PRS SNR L-T HEMODYN MNTR N/A 2019    IMPLANT HEART FAILURE MONITORING DEVICE performed by Jennifer Grace MD at [address], [facility]  CATH LAB       FAMILY HISTORY:  Family History   Problem Relation Age of Onset    Lung Disease Mother     Hypertension Mother     Arthritis-osteo Mother     Heart Disease Mother     Heart Disease Father     Diabetes Father        SOCIAL HISTORY:  Social History     Socioeconomic History    Marital status:      Spouse name: Not on file    Number of children: Not on file    Years of education: Not on file    Highest education level: Not on file   Tobacco Use    Smoking status: Former Smoker     Last attempt to quit: 2010     Years since quittin.1    Smokeless tobacco: Never Used   Substance and Sexual Activity    Alcohol use: Not Currently     Comment: rarely    Drug use: Never   Other Topics Concern       LABORATORY RESULTS:     Labs Latest Ref Rng & Units 2020 2020 2020 2020 2020 1/3/2020 2020   WBC 4.1 - 11.1 K/uL 5.3 6.4 6.8 6.2 6.1 5.6 7.5   RBC 4.10 - 5.70 M/uL 3.15(L) 2.99(L) 2.98(L) 3.10(L) 3.05(L) 2.97(L) 2.77(L)   Hemoglobin 12.1 - 17.0 g/dL 9.2(L) 8.7(L) 8.5(L) 8. 9(L) 8.8(L) 8.5(L) 8.2(L)   Hematocrit 36.6 - 50.3 % 29. 9(L) 28. 4(L) 28. 3(L) 29. 3(L) 28. 5(L) 28. 0(L) 26. 0(L)   MCV 80.0 - 99.0 FL 94.9 95.0 95.0 94.5 93.4 94.3 93.9   Platelets 330 - 467 K/uL 139(L) 138(L) 123(L) 127(L) 131(L) 124(L) 123(L)   Lymphocytes 12 - 49 % - - - - - - -   Monocytes 5 - 13 % - - - - - - -   Eosinophils 0 - 7 % - - - - - - -   Basophils 0 - 1 % - - - - - - -   Albumin 3.5 - 5.0 g/dL 2. 4(L) 2. 3(L) 2. 5(L) 2. 4(L) 2. 4(L) 2. 5(L) 2. 6(L)   Calcium 8.5 - 10.1 MG/DL 8.9 8.7 8.9 9.0 8.3(L) 8.6 8.9   SGOT 15 - 37 U/L 18 18 18 19 21 18 20   Glucose 65 - 100 mg/dL 108(H) 114(H) 104(H) 102(H) 105(H) 97 104(H)   BUN 6 - 20 MG/DL 23(H) 26(H) 23(H) 21(H) 20 19 21(H)   Creatinine 0.70 - 1.30 MG/DL 1.08 1.04 1.11 1.09 1.03 1.02 1.15   Sodium 136 - 145 mmol/L 133(L) 130(L) 131(L) 130(L) 132(L) 131(L) 129(L)   Potassium 3.5 - 5.1 mmol/L 4.6 4.8 4.9 4.6 4.8 4.6 4.3   TSH 0.36 - 3.74 uIU/mL - - - - - - -   LDH 85 - 241 U/L 198 202 186 185 319(H) 166 162   Some recent data might be hidden     Lab Results   Component Value Date/Time    TSH 2.30 12/03/2019 03:29 AM    TSH 2.40 10/25/2019 07:39 PM    TSH 2.45 06/01/2019 04:16 AM       ALLERGY:  No Known Allergies     CURRENT MEDICATIONS:  Current Facility-Administered Medications   Medication Dose Route Frequency    warfarin (COUMADIN) tablet 4 mg  4 mg Oral ONCE    midodrine (PROAMITINE) tablet 5 mg  5 mg Oral TID WITH MEALS    hydrocortisone (CORTAID) 1 % cream   Topical TID PRN    sildenafil (pulm.hypertension) (REVATIO) tablet 20 mg  20 mg Oral TID    thiamine HCL (B-1) tablet 100 mg  100 mg Oral DAILY    venlafaxine-SR (EFFEXOR-XR) capsule 75 mg  75 mg Oral DAILY WITH BREAKFAST    levoFLOXacin (LEVAQUIN) 750 mg in D5W IVPB  750 mg IntraVENous Q24H    cefepime (MAXIPIME) 2 g in 0.9% sodium chloride (MBP/ADV) 100 mL  2 g IntraVENous Q8H    oxybutynin (DITROPAN) tablet 5 mg  5 mg Oral Q6H PRN    magnesium oxide (MAG-OX) tablet 800 mg  800 mg Oral BID    albumin human 5% (BUMINATE) solution 12.5 g  12.5 g IntraVENous DIALYSIS PRN    lidocaine (URO-JET/ GLYDO) 2 % jelly   Urethral PRN    epoetin yvonne-epbx (RETACRIT) injection 20,000 Units  20,000 Units SubCUTAneous Q MON, WED & FRI    albuterol-ipratropium (DUO-NEB) 2.5 MG-0.5 MG/3 ML  3 mL Nebulization Q6H PRN    therapeutic multivitamin (THERAGRAN) tablet 1 Tab  1 Tab Oral DAILY    alteplase (CATHFLO) 1 mg in sterile water (preservative free) 1 mL injection  1 mg InterCATHeter PRN    finasteride (PROSCAR) tablet 5 mg  5 mg Oral DAILY    clonazePAM (KlonoPIN) tablet 0.5 mg  0.5 mg Oral TID PRN    diphenhydrAMINE (BENADRYL) capsule 25 mg  25 mg Oral QHS PRN    octreotide (SANDOSTATIN) injection 25 mcg  25 mcg SubCUTAneous TID    benzocaine-menthol (CEPACOL) lozenge 1 Lozenge  1 Lozenge Mucous Membrane PRN    digoxin (LANOXIN) tablet 0.0625 mg  62.5 mcg Oral Q MON, WED & FRI    pantoprazole (PROTONIX) tablet 40 mg  40 mg Oral ACB    allopurinol (ZYLOPRIM) tablet 100 mg  100 mg Oral DAILY    arformoterol (BROVANA) neb solution 15 mcg  15 mcg Nebulization BID RT    And    budesonide (PULMICORT) 500 mcg/2 ml nebulizer suspension  500 mcg Nebulization BID RT    artificial saliva (MOUTH KOTE) 1 Spray  1 Spray Oral PRN    lactobac ac& pc-s.therm-b.anim (TIAN Q/RISAQUAD)  1 Cap Oral DAILY    oxyCODONE IR (ROXICODONE) tablet 5 mg  5 mg Oral Q6H PRN    tamsulosin (FLOMAX) capsule 0.4 mg  0.4 mg Oral DAILY    Warfarin MD/NP dosing   Other PRN    sodium chloride (NS) flush 5-40 mL  5-40 mL IntraVENous Q8H    sodium chloride (NS) flush 5-40 mL  5-40 mL IntraVENous PRN    acetaminophen (TYLENOL) tablet 650 mg  650 mg Oral Q6H PRN    naloxone (NARCAN) injection 0.4 mg  0.4 mg IntraVENous PRN    ondansetron (ZOFRAN) injection 4 mg  4 mg IntraVENous Q4H PRN    bisacodyl (DULCOLAX) tablet 5 mg  5 mg Oral DAILY PRN    sucralfate (CARAFATE) tablet 1 g  1 g Oral AC&HS    influenza vaccine 2019-20 (6 mos+)(PF) (FLUARIX/FLULAVAL/FLUZONE QUAD) injection 0.5 mL  0.5 mL IntraMUSCular PRIOR TO DISCHARGE    hydrALAZINE (APRESOLINE) 20 mg/mL injection 20 mg  20 mg IntraVENous Q6H PRN    dextrose 10 % infusion 125-250 mL  125-250 mL IntraVENous PRN         Thank you for allowing me to participate in this patient's care.     Donell Welch NP  [address] [place] [address], [address]  Phone: [phone]  Fax: [phone]

## 2020-01-08 NOTE — PROGRESS NOTES
[facility]   [address] [address], [address], [facility]  Phone: [phone]   [phone]       Nephrology Progress Note  Fiona Lama     [birth year]     [id number]  Date of Admission : 10/25/2019  01/08/20    CC: Follow up for JUAN J, CKD, Hyponatremia      Assessment and Plan   JUAN J:  - Cr stable ~ 1-1.1 mg/dl     Pseudomonal UTI and bacteremia:  - from cultures on 12/29. F/u Cx pending from today   - on cefepime per ID    Orthostatic hypotension w/ syncope:  - on midodrine and volume sensitive   - stim test today. Even if normal ==> may be worth trying low dose Florinef     Hyponatremia :  - Na stable at 133    Gross hematuria, BPH w/ retention:  - off CBI pre VAD. 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 CT in place    L arm clot s/p thrombectomy on 11/25    JOSE on CPAP      PAF s/p DCCV 6/19     Anemia:  - from blood loss s/p multiple blood products + IV iron  - cont PAVEL- lower the dose to weekly  when Hb > 10      Interval History:    Seen and examined. He is sitting in the chair. Continues to have orthostasis and near syncope in the early mornings.    No cp or sob reported    Review of Systems: as per HPI    Current Medications:   Current Facility-Administered Medications   Medication Dose Route Frequency    cosyntropin (CORTROSYN) 0.25 mg in 0.9% sodium chloride TEST injection  0.25 mg IntraVENous ONCE    midodrine (PROAMITINE) tablet 5 mg  5 mg Oral TID WITH MEALS    hydrocortisone (CORTAID) 1 % cream   Topical TID PRN    sildenafil (pulm.hypertension) (REVATIO) tablet 20 mg  20 mg Oral TID    thiamine HCL (B-1) tablet 100 mg  100 mg Oral DAILY    venlafaxine-SR (EFFEXOR-XR) capsule 75 mg  75 mg Oral DAILY WITH BREAKFAST    levoFLOXacin (LEVAQUIN) 750 mg in D5W IVPB  750 mg IntraVENous Q24H    cefepime (MAXIPIME) 2 g in 0.9% sodium chloride (MBP/ADV) 100 mL  2 g IntraVENous Q8H    oxybutynin (DITROPAN) tablet 5 mg  5 mg Oral Q6H PRN    magnesium oxide (MAG-OX) tablet 800 mg  800 mg Oral BID    albumin human 5% (BUMINATE) solution 12.5 g  12.5 g IntraVENous DIALYSIS PRN    lidocaine (URO-JET/ GLYDO) 2 % jelly   Urethral PRN    epoetin yvonne-epbx (RETACRIT) injection 20,000 Units  20,000 Units SubCUTAneous Q MON, WED & FRI    albuterol-ipratropium (DUO-NEB) 2.5 MG-0.5 MG/3 ML  3 mL Nebulization Q6H PRN    therapeutic multivitamin (THERAGRAN) tablet 1 Tab  1 Tab Oral DAILY    alteplase (CATHFLO) 1 mg in sterile water (preservative free) 1 mL injection  1 mg InterCATHeter PRN    finasteride (PROSCAR) tablet 5 mg  5 mg Oral DAILY    clonazePAM (KlonoPIN) tablet 0.5 mg  0.5 mg Oral TID PRN    diphenhydrAMINE (BENADRYL) capsule 25 mg  25 mg Oral QHS PRN    octreotide (SANDOSTATIN) injection 25 mcg  25 mcg SubCUTAneous TID    benzocaine-menthol (CEPACOL) lozenge 1 Lozenge  1 Lozenge Mucous Membrane PRN    digoxin (LANOXIN) tablet 0.0625 mg  62.5 mcg Oral Q MON, WED & FRI    pantoprazole (PROTONIX) tablet 40 mg  40 mg Oral ACB    allopurinol (ZYLOPRIM) tablet 100 mg  100 mg Oral DAILY    arformoterol (BROVANA) neb solution 15 mcg  15 mcg Nebulization BID RT    And    budesonide (PULMICORT) 500 mcg/2 ml nebulizer suspension  500 mcg Nebulization BID RT    artificial saliva (MOUTH KOTE) 1 Spray  1 Spray Oral PRN    lactobac ac& pc-s.therm-b.anim (TIAN Q/RISAQUAD)  1 Cap Oral DAILY    oxyCODONE IR (ROXICODONE) tablet 5 mg  5 mg Oral Q6H PRN    tamsulosin (FLOMAX) capsule 0.4 mg  0.4 mg Oral DAILY    Warfarin MD/NP dosing   Other PRN    sodium chloride (NS) flush 5-40 mL  5-40 mL IntraVENous Q8H    sodium chloride (NS) flush 5-40 mL  5-40 mL IntraVENous PRN    acetaminophen (TYLENOL) tablet 650 mg  650 mg Oral Q6H PRN    naloxone (NARCAN) injection 0.4 mg  0.4 mg IntraVENous PRN    ondansetron (ZOFRAN) injection 4 mg  4 mg IntraVENous Q4H PRN    bisacodyl (DULCOLAX) tablet 5 mg  5 mg Oral DAILY PRN    sucralfate (CARAFATE) tablet 1 g  1 g Oral AC&HS    influenza vaccine 2019-20 (6 mos+)(PF) (FLUARIX/FLULAVAL/FLUZONE QUAD) injection 0.5 mL  0.5 mL IntraMUSCular PRIOR TO DISCHARGE    hydrALAZINE (APRESOLINE) 20 mg/mL injection 20 mg  20 mg IntraVENous Q6H PRN    dextrose 10 % infusion 125-250 mL  125-250 mL IntraVENous PRN      No Known Allergies    Objective:  Vitals:    Vitals:    01/07/20 2327 01/08/20 0317 01/08/20 0353 01/08/20 0739   BP:       Pulse: 91 85  82   Resp: 18 16  16   Temp: 98 °F (36.7 °C) 98.1 °F (36.7 °C)  97.6 °F (36.4 °C)   SpO2: 96% 98%  96%   Weight:   75.9 kg (167 lb 5.3 oz)    Height:   6' 2\" (1.88 m)      Intake and Output:  01/08 0701 - 01/08 1900  In: 340 [P.O.:240; I.V.:100]  Out: 250 [Urine:250]  01/06 1901 - 01/08 0700  In: 5512 [P.O.:1520; I.V.:300]  Out: 4025 [Urine:4025]    Physical Examination:    General: Elderly man in no acute distress  Neck:  No lines   Resp:  TA  CV:  VAD sounds; No LE edema  GI:  Soft and non-tender; no distension  Neurologic:  Alert and appropriate; normal speech  :  No Lizama    [x]    High complexity decision making was performed  [x]    Patient is at high-risk of decompensation with multiple organ involvement    Lab Data Personally Reviewed: I have reviewed all the pertinent labs, microbiology data and radiology studies during assessment.     Recent Labs     01/08/20  0301 01/07/20  0258 01/06/20  0019   * 130* 131*   K 4.6 4.8 4.9    101 100   CO2 23 25 25   * 114* 104*   BUN 23* 26* 23*   CREA 1.08 1.04 1.11   CA 8.9 8.7 8.9   MG 1.8 1.8 1.8   ALB 2.4* 2.3* 2.5*   SGOT 18 18 18   ALT 18 18 16   INR 1.5* 1.4* 1.3*     Recent Labs 01/08/20  0301 01/07/20  0258 01/06/20  0019   WBC 5.3 6.4 6.8   HGB 9.2* 8.7* 8.5*   HCT 29.9* 28.4* 28.3*   * 138* 123*     No results found for: SDES  Lab Results   Component Value Date/Time    Culture result: NO GROWTH AFTER 16 HOURS 01/07/2020 11:53 AM    Culture result: LIGHT NORMAL RESPIRATORY TIAN 12/31/2019 04:18 PM    Culture result: (A) 12/31/2019 03:24 PM     PSEUDOMONAS AERUGINOSA ISOLATED FROM 2 OF 4 BOTTLES DRAWN, EACH FROM A DIFFERENT SITE. .. RFA AND LEFT WRIST    Culture result: (A) 12/31/2019 03:24 PM     PRELIMINARY REPORT OF GRAM NEGATIVE RODS GROWING IN 1 OF 4 BOTTLES DRAWN CALLED TO AND READ BACK BY Elodia Fernandes RN AT 8827 ON 1.1.20 RB    Culture result: (A) 12/31/2019 03:24 PM     PRELIMINARY REPORT OF GRAM NEGATIVE RODS GROWING IN 2ND OF 4 BOTTLES DRAWN (DIFFERENT SITE=L WRIST) CALLED TO AND READ BACK BY Elodia Fernandes RN AT 15:51 ON 1/1/20 BY [facility].     Culture result: REMAINING BOTTLE(S) HAS/HAVE NO GROWTH IN 5 DAYS 12/31/2019 03:24 PM     Recent Results (from the past 24 hour(s))   GLUCOSE, POC    Collection Time: 01/07/20 11:16 AM   Result Value Ref Range    Glucose (POC) 137 (H) 65 - 100 mg/dL    Performed by Fede Hargrove  PCT    CULTURE, BLOOD, PAIRED    Collection Time: 01/07/20 11:53 AM   Result Value Ref Range    Special Requests: NO SPECIAL REQUESTS      Culture result: NO GROWTH AFTER 16 HOURS     METABOLIC PANEL, COMPREHENSIVE    Collection Time: 01/08/20  3:01 AM   Result Value Ref Range    Sodium 133 (L) 136 - 145 mmol/L    Potassium 4.6 3.5 - 5.1 mmol/L    Chloride 103 97 - 108 mmol/L    CO2 23 21 - 32 mmol/L    Anion gap 7 5 - 15 mmol/L    Glucose 108 (H) 65 - 100 mg/dL    BUN 23 (H) 6 - 20 MG/DL    Creatinine 1.08 0.70 - 1.30 MG/DL    BUN/Creatinine ratio 21 (H) 12 - 20      GFR est AA >60 >60 ml/min/1.73m2    GFR est non-AA >60 >60 ml/min/1.73m2    Calcium 8.9 8.5 - 10.1 MG/DL    Bilirubin, total 0.6 0.2 - 1.0 MG/DL    ALT (SGPT) 18 12 - 78 U/L    AST (SGOT) 18 15 - 37 U/L    Alk.  phosphatase 111 45 - 117 U/L    Protein, total 6.6 6.4 - 8.2 g/dL    Albumin 2.4 (L) 3.5 - 5.0 g/dL    Globulin 4.2 (H) 2.0 - 4.0 g/dL    A-G Ratio 0.6 (L) 1.1 - 2.2     MAGNESIUM    Collection Time: 01/08/20  3:01 AM   Result Value Ref Range    Magnesium 1.8 1.6 - 2.4 mg/dL   CBC W/O DIFF    Collection Time: 01/08/20  3:01 AM   Result Value Ref Range    WBC 5.3 4.1 - 11.1 K/uL    RBC 3.15 (L) 4.10 - 5.70 M/uL    HGB 9.2 (L) 12.1 - 17.0 g/dL    HCT 29.9 (L) 36.6 - 50.3 %    MCV 94.9 80.0 - 99.0 FL    MCH 29.2 26.0 - 34.0 PG    MCHC 30.8 30.0 - 36.5 g/dL    RDW 19.2 (H) 11.5 - 14.5 %    PLATELET 909 (L) 765 - 400 K/uL    MPV 9.9 8.9 - 12.9 FL    NRBC 0.0 0  WBC    ABSOLUTE NRBC 0.00 0.00 - 0.01 K/uL   PROTHROMBIN TIME + INR    Collection Time: 01/08/20  3:01 AM   Result Value Ref Range    INR 1.5 (H) 0.9 - 1.1      Prothrombin time 15.1 (H) 9.0 - 11.1 sec   PTT    Collection Time: 01/08/20  3:01 AM   Result Value Ref Range    aPTT 33.7 (H) 22.1 - 32.0 sec    aPTT, therapeutic range     58.0 - 77.0 SECS   NT-PRO BNP    Collection Time: 01/08/20  3:01 AM   Result Value Ref Range    NT pro-BNP 2,885 (H) <125 PG/ML   DIGOXIN    Collection Time: 01/08/20  3:01 AM   Result Value Ref Range    Digoxin level 0.4 (L) 0.90 - 2.00 NG/ML   LD    Collection Time: 01/08/20  3:01 AM   Result Value Ref Range     85 - 241 U/L   LACTIC ACID    Collection Time: 01/08/20  3:01 AM   Result Value Ref Range    Lactic acid 1.5 0.4 - 2.0 MMOL/L   PROCALCITONIN    Collection Time: 01/08/20  3:01 AM   Result Value Ref Range    Procalcitonin 0.26 ng/mL                 Tc Humphrey MD

## 2020-01-08 NOTE — PROGRESS NOTES
Problem: Mobility Impaired (Adult and Pediatric)  Goal: *Acute Goals and Plan of Care (Insert Text)  Description  FUNCTIONAL STATUS PRIOR TO ADMISSION: Patient was modified independent using a single point cane for functional mobility. Patient reports an increasingly sedentary lifestyle 2* fatigue and SOB/dyspnea. Retired (so is his wife). Patient reports x 3 falls within the last couple of weeks. Patient is wearing either nasal cannula or CPAP at night. LVAD work-up has been initiated. HOME SUPPORT PRIOR TO ADMISSION: The patient lived with his wife, but did not require physical assistance. Physical Therapy Goals:    Goals upgraded as appropriate 1/07/2020  1. Patient will move from supine to sit and sit to supine, scoot up and down and roll side to side in bed with supervision within 7 days. 2.  Patient will perform sit to/from stand with minimal assistance x 1 within 7 days. 3.  Patient will ambulate 50 feet with least restrictive assistive device and moderate assistance within 7 days. 4.  Stair goal to be formulated when appropriate. 5.  Patient will perform cardiac exercises per protocol with supervision within 7 days. 6.  Patient will verbally and functionally recall mindful movement principles (Move in the Tube) with minimal verbal cuing/reminding within 7 days. 7.  Patient will perform power exchange for power module to/from battery with minimal assistance within 7 days. Reassessed on 1/2/2020 and goals not met, carry-over. Reassessed on 12/26/2019, goals not met but remain appropriate, so carry over  Weekly reassessment completed 12/19/2019 and goals downgraded as appropriate. 1.  Patient will move from supine to sit and sit to supine, scoot up and down and roll side to side in bed with contact guard assistance within 7 days. 2.  Patient will perform sit to/from stand with minimal assistance x 1 within 7 days.   3.  Patient will ambulate 25 feet with least restrictive assistive device and moderate assistance within 7 days. 4.  Stair goal to be formulated when appropriate. 5.  Patient will perform cardiac exercises per protocol with supervision within 7 days. 6.  Patient will verbally and functionally recall mindful movement principles (Move in the Tube) with minimal verbal cuing/reminding within 7 days. 7.  Patient will perform power exchange for power module to/from battery with moderate assistance within 7 days. Re-evaluation completed 11/20/2019 and new goals formulated following LVAD implantation. Reviewed and cont 12/3/2019. Reviewed and continued 12/12/2019  1. Patient will move from supine to sit and sit to supine, scoot up and down and roll side to side in bed with minimal assistance/contact guard assist within 7 days. 2.  Patient will perform sit to/from stand with minimal assistance/contact guard assist within 7 days. 3.  Patient will ambulate 150 feet with least restrictive assistive device and minimal assistance/contact guard assist within 7 days. 4.  Patient will ascend/descend 4 stairs with handrail(s) with minimal assistance/contact guard assist within 7 days. 5.  Patient will perform cardiac exercises per protocol with supervision within 7 days. 6.  Patient will verbally and functionally recall 3/3 sternal precautions within 7 days. 7.  Patient will perform power exchange for power module to/from battery with moderate assistance  within 7 days. Initiated 10/27/2019; updated 11/15/2019   1. Patient will move from supine to sit and sit to supine, scoot up and down and roll side to side in bed with independence within 7 days. 2.  Patient will perform sit to/from stand with supervision/set-up within 7 days. 3.  Patient will ambulate 200 feet with least restrictive assistive device and supervision/set-up within 7 days.    4.  Patient will ascend/descend 4 stairs with  handrail(s) with supervision/set-up within 7 days for functional strengthening and community reintegration. .  5.  Patient will verbally and functionally recall 3/3 sternal precautions within 7 days in preparation for LVAD implantation. 6.  Patient will perform a mock power exchange for power module to/from battery with supervision/set-up within 7 days in preparation for LVAD implantation. 1.  Patient will move from supine to sit and sit to supine, scoot up and down and roll side to side in bed with independence within 7 days. 2.  Patient will perform sit to/from stand with supervision/set-up within 7 days. 3.  Patient will ambulate 150 feet with least restrictive assistive device and supervision/set-up within 7 days. 4.  Patient will ascend/descend 4 stairs with  handrail(s) with supervision/set-up within 7 days for functional strengthening and community reintegration. .  5.  Patient will verbally and functionally recall 3/3 sternal precautions within 7 days in preparation for LVAD implantation. 6.  Patient will perform a mock power exchange for power module to/from battery with supervision/set-up within 7 days in preparation for LVAD implantation. Outcome: Progressing Towards Goal     PHYSICAL THERAPY TREATMENT  Patient: Donny Shaikh (60 y.o. male)  Date: 1/8/2020  Diagnosis: Acute decompensated heart failure [facility] [I50.9]   <principal problem not specified>  Procedure(s) (LRB):  LEFT BRACHIAL THROMBECTOMY (Left) 44 Days Post-Op  Precautions: Fall, Sternal(LVAD )  Chart, physical therapy assessment, plan of care and goals were reviewed. ASSESSMENT  Patient continues with skilled PT services and is progressing towards goals. Pt eager to attempt to ambulate but concerned with tremors. Pt was able to ambulate with the assistance of two and rolling walker. Pt had an ataxic gait pattern with increase postural sway. Pt reports slight fatigue with gait reporting he could increase gait tolerance. Next session will utilize chair follow to challenge gait tolerance.  Pt had no c/o dizziness/ lightheartedness     Current Level of Function Impacting Discharge (mobility/balance): gait min/mod A x2 with rolling walker short distance     Other factors to consider for discharge: LVAD, H/O CVA, tremors,          PLAN :  Patient continues to benefit from skilled intervention to address the above impairments. Continue treatment per established plan of care. to address goals. Recommendation for discharge: (in order for the patient to meet his/her long term goals)  Therapy 3 hours per day 5-7 days per week    This discharge recommendation:  Has not yet been discussed the attending provider and/or case management    IF patient discharges home will need the following DME: TBD       SUBJECTIVE:   Patient stated We can try.     OBJECTIVE DATA SUMMARY:   Critical Behavior:  Neurologic State: Alert  Orientation Level: Oriented X4  Cognition: Appropriate for age attention/concentration  Safety/Judgement: Decreased awareness of need for safety, Decreased insight into deficits  Functional Mobility Training:  Bed Mobility:     Supine to Sit: (Pt on BSC upon arrival)  Sit to Supine: (Pt sitting in chair after treatment)           Transfers:  Sit to Stand: Minimum assistance  Stand to Sit: Minimum assistance                             Balance:  Sitting: Intact  Standing: Impaired; Without support  Standing - Static: Constant support; Fair  Standing - Dynamic : Constant support;Occasional;Poor  Ambulation/Gait Training:  Distance (ft): 18 feet and 25 Feet (ft)  Assistive Device: Gait belt;Walker, rolling  Ambulation - Level of Assistance: Minimal assistance; Moderate assistance;Assist x2        Gait Abnormalities: Ataxic;Decreased step clearance        Base of Support: Narrowed                             Stairs: Therapeutic Exercises:     Pain Rating:  No complaints     Activity Tolerance:   Limited   Please refer to the flowsheet for vital signs taken during this treatment.     After treatment patient left in no apparent distress:   Sitting in chair and Call bell within reach    COMMUNICATION/COLLABORATION:   The patients plan of care was discussed with: Registered Nurse    Jhon Cornelius PTA

## 2020-01-09 NOTE — PROGRESS NOTES
0730: Bedside shift change report given to Latasha RN (oncoming nurse) by Meri/Shari RN (offgoing nurse). Report included the following information SBAR, Kardex, Procedure Summary, Intake/Output, MAR, and Recent Results. 0840: Dr. Ailene Severance at bedside to assess sternal wound. Orders for wet to dry dressing. 1930: Bedside shift change report given to Shari/Meri RN (oncoming nurse) by Marcio Caldwell RN (offgoing nurse). Report included the following information SBAR, Kardex, Procedure Summary, Intake/Output, MAR and Recent Results. Problem: Falls - Risk of  Goal: *Absence of Falls  Description  Document Cathzuly Acuna Fall Risk and appropriate interventions in the flowsheet. Outcome: Progressing Towards Goal  Note: Fall Risk Interventions:  Mobility Interventions: Patient to call before getting OOB, PT Consult for mobility concerns, PT Consult for assist device competence    Mentation Interventions: Adequate sleep, hydration, pain control    Medication Interventions: Assess postural VS orthostatic hypotension, Teach patient to arise slowly, Patient to call before getting OOB    Elimination Interventions: Call light in reach, Toilet paper/wipes in reach, Toileting schedule/hourly rounds    History of Falls Interventions: Consult care management for discharge planning         Problem: Patient Education: Go to Patient Education Activity  Goal: Patient/Family Education  Outcome: Progressing Towards Goal     Problem: Pressure Injury - Risk of  Goal: *Prevention of pressure injury  Description  Document Mich Scale and appropriate interventions in the flowsheet.   Outcome: Progressing Towards Goal  Note: Pressure Injury Interventions:  Sensory Interventions: Assess changes in LOC    Moisture Interventions: Absorbent underpads    Activity Interventions: Increase time out of bed    Mobility Interventions: HOB 30 degrees or less, Pressure redistribution bed/mattress (bed type), PT/OT evaluation    Nutrition Interventions: Document food/fluid/supplement intake, Offer support with meals,snacks and hydration, Discuss nutritional consult with provider    Friction and Shear Interventions: Apply protective barrier, creams and emollients                Problem: Patient Education: Go to Patient Education Activity  Goal: Patient/Family Education  Outcome: Progressing Towards Goal     Problem: Infection - Risk of, Urinary Catheter-Associated Urinary Tract Infection  Goal: *Absence of infection signs and symptoms  Outcome: Progressing Towards Goal     Problem: LVAD Post-op Day 15 to Discharge  Goal: Medications  Outcome: Progressing Towards Goal

## 2020-01-09 NOTE — PROGRESS NOTES
[facility]   [address] [facility], [address], [facility] [place]  Phone: [phone]   [phone]       Nephrology Progress Note  Jessica Stands     [birth year]     [id number]  Date of Admission : 10/25/2019  01/09/20    CC: Follow up for JUAN J, CKD, Hyponatremia      Assessment and Plan   JUAN J:  - Cr stable ~ 1-1.1 mg/dl   - cont present care    Pseudomonal UTI and bacteremia:  - from cultures on 12/29. F/u Cx pending from today   - on cefepime per ID    Orthostatic hypotension w/ syncope:  - on midodrine and volume sensitive   - stim test ok     Hyponatremia :  - Na stable at 133    Gross hematuria, BPH w/ retention:  - off CBI pre VAD. 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 CT in place    L arm clot s/p thrombectomy on 11/25    JOSE on CPAP      PAF s/p DCCV 6/19     Anemia:  - from blood loss s/p multiple blood products + IV iron  - cont PAVEL- lower the dose to weekly  when Hb > 10      Interval History:    Seen and examined. He is sitting in the chair. Feels comfortable. On florinef. No cp or sob.     Review of Systems: as per HPI    Current Medications:   Current Facility-Administered Medications   Medication Dose Route Frequency    warfarin (COUMADIN) tablet 4 mg  4 mg Oral ONCE    fludrocortisone (FLORINEF) tablet 0.1 mg  0.1 mg Oral DAILY    clonazePAM (KlonoPIN) tablet 0.5 mg  0.5 mg Oral BID    midodrine (PROAMITINE) tablet 5 mg  5 mg Oral TID WITH MEALS    hydrocortisone (CORTAID) 1 % cream   Topical TID PRN    sildenafil (pulm.hypertension) (REVATIO) tablet 20 mg  20 mg Oral TID    thiamine HCL (B-1) tablet 100 mg  100 mg Oral DAILY    venlafaxine-SR (EFFEXOR-XR) capsule 75 mg  75 mg Oral DAILY WITH BREAKFAST    levoFLOXacin (LEVAQUIN) 750 mg in D5W IVPB  750 mg IntraVENous Q24H    cefepime (MAXIPIME) 2 g in 0.9% sodium chloride (MBP/ADV) 100 mL  2 g IntraVENous Q8H    oxybutynin (DITROPAN) tablet 5 mg  5 mg Oral Q6H PRN    magnesium oxide (MAG-OX) tablet 800 mg  800 mg Oral BID    albumin human 5% (BUMINATE) solution 12.5 g  12.5 g IntraVENous DIALYSIS PRN    lidocaine (URO-JET/ GLYDO) 2 % jelly   Urethral PRN    epoetin yvonne-epbx (RETACRIT) injection 20,000 Units  20,000 Units SubCUTAneous Q MON, WED & FRI    albuterol-ipratropium (DUO-NEB) 2.5 MG-0.5 MG/3 ML  3 mL Nebulization Q6H PRN    therapeutic multivitamin (THERAGRAN) tablet 1 Tab  1 Tab Oral DAILY    alteplase (CATHFLO) 1 mg in sterile water (preservative free) 1 mL injection  1 mg InterCATHeter PRN    finasteride (PROSCAR) tablet 5 mg  5 mg Oral DAILY    diphenhydrAMINE (BENADRYL) capsule 25 mg  25 mg Oral QHS PRN    octreotide (SANDOSTATIN) injection 25 mcg  25 mcg SubCUTAneous TID    benzocaine-menthol (CEPACOL) lozenge 1 Lozenge  1 Lozenge Mucous Membrane PRN    digoxin (LANOXIN) tablet 0.0625 mg  62.5 mcg Oral Q MON, WED & FRI    pantoprazole (PROTONIX) tablet 40 mg  40 mg Oral ACB    allopurinol (ZYLOPRIM) tablet 100 mg  100 mg Oral DAILY    arformoterol (BROVANA) neb solution 15 mcg  15 mcg Nebulization BID RT    And    budesonide (PULMICORT) 500 mcg/2 ml nebulizer suspension  500 mcg Nebulization BID RT    artificial saliva (MOUTH KOTE) 1 Spray  1 Spray Oral PRN    lactobac ac& pc-s.therm-b.anim (TIAN Q/RISAQUAD)  1 Cap Oral DAILY    oxyCODONE IR (ROXICODONE) tablet 5 mg  5 mg Oral Q6H PRN    tamsulosin (FLOMAX) capsule 0.4 mg  0.4 mg Oral DAILY    Warfarin MD/NP dosing   Other PRN    sodium chloride (NS) flush 5-40 mL  5-40 mL IntraVENous Q8H    sodium chloride (NS) flush 5-40 mL  5-40 mL IntraVENous PRN    acetaminophen (TYLENOL) tablet 650 mg  650 mg Oral Q6H PRN    naloxone (NARCAN) injection 0.4 mg  0.4 mg IntraVENous PRN    ondansetron (ZOFRAN) injection 4 mg  4 mg IntraVENous Q4H PRN    bisacodyl (DULCOLAX) tablet 5 mg  5 mg Oral DAILY PRN    sucralfate (CARAFATE) tablet 1 g  1 g Oral AC&HS    influenza vaccine 2019-20 (6 mos+)(PF) (FLUARIX/FLULAVAL/FLUZONE QUAD) injection 0.5 mL  0.5 mL IntraMUSCular PRIOR TO DISCHARGE    hydrALAZINE (APRESOLINE) 20 mg/mL injection 20 mg  20 mg IntraVENous Q6H PRN    dextrose 10 % infusion 125-250 mL  125-250 mL IntraVENous PRN      No Known Allergies    Objective:  Vitals:    Vitals:    01/09/20 0500 01/09/20 0754 01/09/20 1118 01/09/20 1149   BP:       Pulse: 78 91 90 89   Resp: 18 18 20 18   Temp: 97.8 °F (36.6 °C) 97.9 °F (36.6 °C) 98 °F (36.7 °C)    SpO2: 99% 98% 99% 97%   Weight: 76.6 kg (168 lb 14 oz)      Height:         Intake and Output:  01/09 0701 - 01/09 1900  In: 650 [P.O.:650]  Out: 600 [Urine:600]  01/07 1901 - 01/09 0700  In: 1639 [P.O.:2374; I.V.:500]  Out: 6439 [Urine:4725]    Physical Examination:    General: In NAD  Neck:  No lines   Resp:  CTA b/l  CV:  VAD sounds; No LE edema  GI:  Soft and non-tender; no distension  Neurologic:  Alert and appropriate; normal speech  :  No Lizama    [x]    High complexity decision making was performed  [x]    Patient is at high-risk of decompensation with multiple organ involvement    Lab Data Personally Reviewed: I have reviewed all the pertinent labs, microbiology data and radiology studies during assessment.     Recent Labs     01/09/20  0537 01/08/20  0301 01/07/20  0258   * 133* 130*   K 4.3 4.6 4.8    103 101   CO2 24 23 25   * 108* 114*   BUN 21* 23* 26*   CREA 1.11 1.08 1.04   CA 9.1 8.9 8.7   MG 1.8 1.8 1.8   ALB 2.4* 2.4* 2.3*   SGOT 21 18 18   ALT 17 18 18   INR 1.7* 1.5* 1.4*     Recent Labs     01/09/20  0537 01/08/20  0301 01/07/20  0258   WBC 5.7 5.3 6.4   HGB 9.1* 9.2* 8.7*   HCT 29.3* 29.9* 28.4*   PLT 138* 139* 138*     No results found for: SDES  Lab Results   Component Value Date/Time    Culture result: NO GROWTH AFTER 18 HOURS 01/08/2020 02:04 PM    Culture result: NO GROWTH 2 DAYS 01/07/2020 11:53 AM    Culture result: LIGHT NORMAL RESPIRATORY TIAN 12/31/2019 04:18 PM    Culture result: (A) 12/31/2019 03:24 PM     PSEUDOMONAS AERUGINOSA ISOLATED FROM 2 OF 4 BOTTLES DRAWN, EACH FROM A DIFFERENT SITE. .. RFA AND LEFT WRIST    Culture result: (A) 12/31/2019 03:24 PM     PRELIMINARY REPORT OF GRAM NEGATIVE RODS GROWING IN 1 OF 4 BOTTLES DRAWN CALLED TO AND READ BACK BY Radha Gorman RN AT 3265 ON 1.1.20 RB    Culture result: (A) 12/31/2019 03:24 PM     PRELIMINARY REPORT OF GRAM NEGATIVE RODS GROWING IN 2ND OF 4 BOTTLES DRAWN (DIFFERENT SITE=L WRIST) CALLED TO AND READ BACK BY Radha Gorman RN AT 15:51 ON 1/1/20 BY [facility].     Culture result: REMAINING BOTTLE(S) HAS/HAVE NO GROWTH IN 5 DAYS 12/31/2019 03:24 PM     Recent Results (from the past 24 hour(s))   CULTURE, WOUND W GRAM STAIN    Collection Time: 01/08/20  2:04 PM   Result Value Ref Range    Special Requests: CHEST      GRAM STAIN NO WBC'S SEEN      GRAM STAIN NO ORGANISMS SEEN      Culture result: NO GROWTH AFTER 18 HOURS     TYPE & SCREEN    Collection Time: 01/09/20  5:33 AM   Result Value Ref Range    Crossmatch Expiration 01/12/2020     ABO/Rh(D) O POSITIVE     Antibody screen NEG     Comment       Previously identified Nonspecific Antibody and Nonspecific Cold Antibody    ROYER Poly POS     ROYER IgG POS     ROYER C3b/C3d NEG     Unit number [id number]     Blood component type  LR     Unit division 00     Status of unit ALLOCATED     Crossmatch result Compatible     Unit number [id number]     Blood component type  LR     Unit division 00     Status of unit ALLOCATED     Crossmatch result Compatible    PROCALCITONIN    Collection Time: 01/09/20  5:37 AM   Result Value Ref Range    Procalcitonin 0.16 ng/mL   LACTIC ACID    Collection Time: 01/09/20 5: 37 AM   Result Value Ref Range    Lactic acid 1.1 0.4 - 2.0 MMOL/L   DIGOXIN    Collection Time: 01/09/20  5:37 AM   Result Value Ref Range    Digoxin level 0.7 (L) 0.90 - 4.81 NG/ML   METABOLIC PANEL, COMPREHENSIVE    Collection Time: 01/09/20  5:37 AM   Result Value Ref Range    Sodium 133 (L) 136 - 145 mmol/L    Potassium 4.3 3.5 - 5.1 mmol/L    Chloride 104 97 - 108 mmol/L    CO2 24 21 - 32 mmol/L    Anion gap 5 5 - 15 mmol/L    Glucose 103 (H) 65 - 100 mg/dL    BUN 21 (H) 6 - 20 MG/DL    Creatinine 1.11 0.70 - 1.30 MG/DL    BUN/Creatinine ratio 19 12 - 20      GFR est AA >60 >60 ml/min/1.73m2    GFR est non-AA >60 >60 ml/min/1.73m2    Calcium 9.1 8.5 - 10.1 MG/DL    Bilirubin, total 0.5 0.2 - 1.0 MG/DL    ALT (SGPT) 17 12 - 78 U/L    AST (SGOT) 21 15 - 37 U/L    Alk.  phosphatase 111 45 - 117 U/L    Protein, total 6.7 6.4 - 8.2 g/dL    Albumin 2.4 (L) 3.5 - 5.0 g/dL    Globulin 4.3 (H) 2.0 - 4.0 g/dL    A-G Ratio 0.6 (L) 1.1 - 2.2     MAGNESIUM    Collection Time: 01/09/20  5:37 AM   Result Value Ref Range    Magnesium 1.8 1.6 - 2.4 mg/dL   CBC W/O DIFF    Collection Time: 01/09/20  5:37 AM   Result Value Ref Range    WBC 5.7 4.1 - 11.1 K/uL    RBC 3.09 (L) 4.10 - 5.70 M/uL    HGB 9.1 (L) 12.1 - 17.0 g/dL    HCT 29.3 (L) 36.6 - 50.3 %    MCV 94.8 80.0 - 99.0 FL    MCH 29.4 26.0 - 34.0 PG    MCHC 31.1 30.0 - 36.5 g/dL    RDW 19.5 (H) 11.5 - 14.5 %    PLATELET 864 (L) 956 - 400 K/uL    MPV 10.4 8.9 - 12.9 FL    NRBC 0.0 0  WBC    ABSOLUTE NRBC 0.00 0.00 - 0.01 K/uL   PROTHROMBIN TIME + INR    Collection Time: 01/09/20  5:37 AM   Result Value Ref Range    INR 1.7 (H) 0.9 - 1.1      Prothrombin time 16.9 (H) 9.0 - 11.1 sec   PTT    Collection Time: 01/09/20  5:37 AM   Result Value Ref Range    aPTT 34.5 (H) 22.1 - 32.0 sec    aPTT, therapeutic range     58.0 - 77.0 SECS   LD    Collection Time: 01/09/20  5:37 AM   Result Value Ref Range     85 - 241 U/L   NT-PRO BNP    Collection Time: 01/09/20  5:37 AM   Result Value Ref Range    NT pro-BNP 5,000 (H) <125 PG/ML                 Jhonatan Chapa MD

## 2020-01-09 NOTE — PROGRESS NOTES
Problem: Mobility Impaired (Adult and Pediatric)  Goal: *Acute Goals and Plan of Care (Insert Text)  Description  FUNCTIONAL STATUS PRIOR TO ADMISSION: Patient was modified independent using a single point cane for functional mobility. Patient reports an increasingly sedentary lifestyle 2* fatigue and SOB/dyspnea. Retired (so is his wife). Patient reports x 3 falls within the last couple of weeks. Patient is wearing either nasal cannula or CPAP at night. LVAD work-up has been initiated. HOME SUPPORT PRIOR TO ADMISSION: The patient lived with his wife, but did not require physical assistance. Physical Therapy Goals:    Goals upgraded as appropriate 1/07/2020- Goals appropriate 1/9/2020 on weekly reassessment  1. Patient will move from supine to sit and sit to supine, scoot up and down and roll side to side in bed with supervision within 7 days. 2.  Patient will perform sit to/from stand with minimal assistance x 1 within 7 days. 3.  Patient will ambulate 50 feet with least restrictive assistive device and moderate assistance within 7 days. 4.  Stair goal to be formulated when appropriate. 5.  Patient will perform cardiac exercises per protocol with supervision within 7 days. 6.  Patient will verbally and functionally recall mindful movement principles (Move in the Tube) with minimal verbal cuing/reminding within 7 days. 7.  Patient will perform power exchange for power module to/from battery with minimal assistance within 7 days. Reassessed on 1/2/2020 and goals not met, carry-over. Reassessed on 12/26/2019, goals not met but remain appropriate, so carry over  Weekly reassessment completed 12/19/2019 and goals downgraded as appropriate. 1.  Patient will move from supine to sit and sit to supine, scoot up and down and roll side to side in bed with contact guard assistance within 7 days. 2.  Patient will perform sit to/from stand with minimal assistance x 1 within 7 days.   3.  Patient will ambulate 25 feet with least restrictive assistive device and moderate assistance within 7 days. 4.  Stair goal to be formulated when appropriate. 5.  Patient will perform cardiac exercises per protocol with supervision within 7 days. 6.  Patient will verbally and functionally recall mindful movement principles (Move in the Tube) with minimal verbal cuing/reminding within 7 days. 7.  Patient will perform power exchange for power module to/from battery with moderate assistance within 7 days. Re-evaluation completed 11/20/2019 and new goals formulated following LVAD implantation. Reviewed and cont 12/3/2019. Reviewed and continued 12/12/2019  1. Patient will move from supine to sit and sit to supine, scoot up and down and roll side to side in bed with minimal assistance/contact guard assist within 7 days. 2.  Patient will perform sit to/from stand with minimal assistance/contact guard assist within 7 days. 3.  Patient will ambulate 150 feet with least restrictive assistive device and minimal assistance/contact guard assist within 7 days. 4.  Patient will ascend/descend 4 stairs with handrail(s) with minimal assistance/contact guard assist within 7 days. 5.  Patient will perform cardiac exercises per protocol with supervision within 7 days. 6.  Patient will verbally and functionally recall 3/3 sternal precautions within 7 days. 7.  Patient will perform power exchange for power module to/from battery with moderate assistance  within 7 days. Initiated 10/27/2019; updated 11/15/2019   1. Patient will move from supine to sit and sit to supine, scoot up and down and roll side to side in bed with independence within 7 days. 2.  Patient will perform sit to/from stand with supervision/set-up within 7 days. 3.  Patient will ambulate 200 feet with least restrictive assistive device and supervision/set-up within 7 days.    4.  Patient will ascend/descend 4 stairs with  handrail(s) with supervision/set-up within 7 days for functional strengthening and community reintegration. .  5.  Patient will verbally and functionally recall 3/3 sternal precautions within 7 days in preparation for LVAD implantation. 6.  Patient will perform a mock power exchange for power module to/from battery with supervision/set-up within 7 days in preparation for LVAD implantation. 1.  Patient will move from supine to sit and sit to supine, scoot up and down and roll side to side in bed with independence within 7 days. 2.  Patient will perform sit to/from stand with supervision/set-up within 7 days. 3.  Patient will ambulate 150 feet with least restrictive assistive device and supervision/set-up within 7 days. 4.  Patient will ascend/descend 4 stairs with  handrail(s) with supervision/set-up within 7 days for functional strengthening and community reintegration. .  5.  Patient will verbally and functionally recall 3/3 sternal precautions within 7 days in preparation for LVAD implantation. 6.  Patient will perform a mock power exchange for power module to/from battery with supervision/set-up within 7 days in preparation for LVAD implantation. Outcome: Progressing Towards Goal   PHYSICAL THERAPY TREATMENT: WEEKLY REASSESSMENT  Patient: Cornel Silverio (75 y.o. male)  Date: 1/9/2020  Primary Diagnosis: Acute decompensated heart failure ([facility] [address] [I50.9]  Procedure(s) (LRB):  LEFT BRACHIAL THROMBECTOMY (Left) 45 Days Post-Op   Precautions:   Fall      ASSESSMENT  Patient continues with skilled PT services and is progressing towards goals. Patient received supine in bed, and able to complete switchover and latonia vest with min A/verbal cues. Good progress with gait tolerance this date, and patient denies symptoms of low MAP. He fatigued quickly, and presented with decreased insight into safety as he required therapist directed seated rest break due to fatigue/weakness.  Generalized weakness remains his most limiting factor, followed by endurance. Continues with antalgic gait, frequent path deviations, and increasingly forward flexed posture with fatigue. With education on posture and pacing, his second gait trial was much improved with self selected pace, posture, and ataxia. Remains highly motivated and making good progress this week with improved medical stability. Patient's progression toward goals since last assessment: Goals re-written 1/7/2020. Making progress towards all goals and with more consistency in performance over the next 7 days, anticipate upgrading goals next re-assess. Current Level of Function Impacting Discharge (mobility/balance): min-mod Ax2 for steadying during gait, min A for sit<>stand    Functional Outcome Measure: The patient scored 7/28 on the Tinetti outcome measure which is indicative of high fall risk. Other factors to consider for discharge: LVAD, long complicated medical course          PLAN :  Goals have been updated based on progression since last assessment. Patient continues to benefit from skilled intervention to address the above impairments. Recommendations and Planned Interventions: bed mobility training, transfer training, gait training, therapeutic exercises, neuromuscular re-education, edema management/control, patient and family training/education, and therapeutic activities      Frequency/Duration: Patient will be followed by physical therapy:  5 times a week to address goals. Recommendation for discharge: (in order for the patient to meet his/her long term goals)  Therapy 3 hours per day 5-7 days per week    This discharge recommendation:  Has been made in collaboration with the attending provider and/or case management    IF patient discharges home will need the following DME: to be determined (TBD)         SUBJECTIVE:   Patient stated I'm trying to do better.     OBJECTIVE DATA SUMMARY:   HISTORY:    Past Medical History:   Diagnosis Date    Degenerative disc disease, lumbar     Heart failure (HCC)     High cholesterol     Hypertension     Paroxysmal atrial fibrillation ([facility] [address] 4/2/2019    Spinal stenosis      Past Surgical History:   Procedure Laterality Date    COLONOSCOPY Left 11/11/2019    COLONOSCOPY at bedside performed by Reyes Solo, MD at [facility] ENDOSCOPY    [address] GRAFT      triple    HX HERNIA REPAIR      HX IMPLANTABLE CARDIOVERTER [address] EXTERNAL N/A 6/10/2019    EP CARDIOVERSION performed by Mj Gamez MD at [address] CATH LAB    MS CARDIOVERSION ELECTIVE ARRHYTHMIA EXTERNAL N/A 6/18/2019    EP CARDIOVERSION performed by Carlos Browne MD at [address] CATH LAB    MS INSJ/RPLCMT PERM DFB W/TRNSVNS LDS 1/DUAL [address] N/A 6/21/2019    INSERT ICD BIV MULTI performed by Jose Myles MD at [address] CATH LAB    MS TCAT IMPL WRLS P-ART PRS SNR L-T HEMODYN MNTR N/A 9/18/2019    IMPLANT HEART FAILURE MONITORING DEVICE performed by Laurel Brown MD at [address] CATH LAB       Personal factors and/or comorbidities impacting plan of care: PMH    Home Situation  Home Environment: Private residence  # Steps to Enter: 0  One/Two Story Residence: One story  Living Alone: No  Support Systems: Spouse/Significant Other/Partner  Patient Expects to be Discharged to[de-identified] Unknown  Current DME Used/Available at Home: Cane, straight, Walker, rolling, CPAP  Tub or Shower Type: Shower    EXAMINATION/PRESENTATION/DECISION MAKING:   Critical Behavior:  Neurologic State: Alert  Orientation Level: Oriented X4  Cognition: Appropriate for age attention/concentration  Safety/Judgement: Decreased insight into deficits  Hearing:   Auditory  Auditory Impairment: None  Range Of Motion:  AROM: Generally decreased, functional  Strength:    Strength: Generally decreased, functional  Tone & Sensation:   Tone: Normal  Sensation: Intact  Coordination:  Coordination: Generally decreased, functional  Functional Mobility:  Bed Mobility:  Supine to Sit: Stand-by assistance  Sit to Supine: Moderate assistance(A for BLE)  Transfers:  Sit to Stand: Minimum assistance  Stand to Sit: Minimum assistance  Balance:   Sitting: Intact  Sitting - Static: Good (unsupported)  Sitting - Dynamic: Fair (occasional)  Standing: Impaired; With support  Standing - Static: Fair  Standing - Dynamic : Fair;Poor  Ambulation/Gait Training:  Distance (ft): 60 Feet (ft)(x2)  Assistive Device: Walker, rolling;Gait belt  Ambulation - Level of Assistance: Minimal assistance; Moderate assistance;Assist x2  Gait Abnormalities: Ataxic;Decreased step clearance; Path deviations  Base of Support: Widened;Center of gravity altered    Functional Measure:  Tinetti test:    Sitting Balance: 1  Arises: 0  Attempts to Rise: 0  Immediate Standing Balance: 0  Standing Balance: 0  Nudged: 0  Eyes Closed: 0  Turn 360 Degrees - Continuous/Discontinuous: 0  Turn 360 Degrees - Steady/Unsteady: 0  Sitting Down: 0  Balance Score: 1 Balance total score  Indication of Gait: 0  R Step Length/Height: 1  L Step Length/Height: 1  R Foot Clearance: 1  L Foot Clearance: 1  Step Symmetry: 1  Step Continuity: 1  Path: 0  Trunk: 0  Walking Time: 0  Gait Score: 6 Gait total score  Total Score: 7/28 Overall total score         Tinetti Tool Score Risk of Falls  <19 = High Fall Risk  19-24 = Moderate Fall Risk  25-28 = Low Fall Risk  Tinetti ME. Performance-Oriented Assessment of Mobility Problems in Elderly Patients. [address]; [id number].  (Scoring Description: PT Bulletin Feb. 10, 1993)    Older adults: Eula Harrell et al, 2009; n = 1000 [facility] elderly evaluated with ABC, XAVIER, ADL, and IADL)  · Mean XAVIER score for males aged 69-68 years = 26.21(3.40)  · Mean XAVIER score for females age 69-68 years = 25.16(4.30)  · Mean XAVIER score for males over 80 years = 23.29(6.02)  · Mean XAVIER score for females over 80 years = 17.20(8.32)           Pain Ratin/10    Activity Tolerance: Fair, SpO2 stable on RA, requires frequent rest breaks, and observed SOB with activity  Please refer to the flowsheet for vital signs taken during this treatment. After treatment patient left in no apparent distress:   Sitting in chair, Call bell within reach, and Caregiver / family present    COMMUNICATION/EDUCATION:   The patients plan of care was discussed with: Registered Nurse. Fall prevention education was provided and the patient/caregiver indicated understanding., Patient/family have participated as able in goal setting and plan of care. , and Patient/family agree to work toward stated goals and plan of care.     Thank you for this referral.  Pema Goodman, PT, DPT   Time Calculation: 25 mins

## 2020-01-09 NOTE — PROGRESS NOTES
Patient visited by [facility] Partner Volunteer on Cardiovascular Services Unit on 1/9/2020. Rev.  Rashaun Ruffin MDiv, [facility], [facility]   paging service: [phone])

## 2020-01-09 NOTE — PROGRESS NOTES
[facility] in [address] [place] [address]  Inpatient Progress Note      Patient name: Jimmy Javier  Patient : 1950  Patient MRN: [id number]  Attending MD: Shari Donaldson MD  Date of service: 20    Chief Complaint:   Shelglenys Rasher azucena LVAD implant     HPI: Sona Kiser is a 71y.o. year old pleasant white male with a history of HTN, HLD, JOSE, CAD s/p cardiac arrest VFib s/p CABG () c/b sternal would infection and sternectomy, ischemic cardiomyopathy LVEF 15-20%, s/p ICD and with LBBB. He was admitted with acute on chronic systolic heart failure with massive volume overload > 20 lbs, in the setting of atrial fibrillation s/p failed DCCV and underwent DCCV and RHC on .  S/p BiVICD on 19 with Dr. Chang Gil. He was discharged home home on IV milrinone on 19. Patrice Hartman has been followed closely by Dr. Irlanda Oliver and the [facility].     Mr. Kiser was admitted for acute on chronic systolic heart failure. He underwent implantation of Impella 5.0 due to CS and has completed his LVAD evaluation. Patrice Hartman meets criteria for implant of HM3 as DT. He was NYHA Class IV prior to implant of Impella 5.0, has LVEF < 15%, was intolerant of GDMT due to symptomatic hypotension and renal dysfunction. He remains dependent on temporary MCS support. RHC  revealed compensated hemodynamics on Impella. His renal function has improved dramatically with improvement in his hemodynamics. He is not a suitable transplant candidate due to single kidney, sternectomy, debility, and frailty. He was reviewed by David Prakash and felt to be a high risk heart transplant candidate due to multiple co-morbidities as well as the afore mentioned conditions.  He remained in the CCU and underwent a HM 3 implant as DT on .   He was weaned off of pressors and transferred to [place] [address] where he continues to undergo PT/OT and hemodynamic optimization.       24Hr Events:   Syncopal event early this AM   MAPs stable  INR 1.7  Feels better each day     Procedure:  Procedure(s):  REMOVAL TEMPORARY LEFT VENTRICULAR ASSIST DEVICE, IMPLANTATION OF LEFT VENTRICULAR ASSIST DEVICE PERMANENT (VAD), ECC, JACQUE, EPI AORTIC US BY DR Marysol Clemente.     POD:  51     Impression / Plan:   Heart Failure Status: NYHA Class IV, improved to NYHA Class III    Chronic systolic heart failure due to ICM, NYHA Class IV, EF < 15%, cardiogenic shock bridged with Impella 5.0 support to HM 3 implant   S/p Impella removal and LVAD implant 11/18/19  RPMs stable at 6600   Multiple PI events on interrogation   Continue Sildenafil 20 mg PO TID  Intolerant of BB due to RV failure  Intolerant of ACEi/ARB/ARNI/AA due to JUAN J on CKD 3  Intolerant of spironolactone d/t IVVD   Continue midodrine 5 mg po TID for orthostatic hypotension  Strict I/O, daily weights  Continue LVAD education   Dressing change frequency three times weekly, sutures removed 12/26/19    Bacteremia - Pseudomonas  Blood cultures (12/31/19) 2/4 bottles +Pseudomonas & 2/4 bottles GNRs    Repeat BC NGTD  On IV Cefepime and Levaquin   Follow procalcitonin and lactic acid levels - both improving    Orthostatic Hypotension  Midodrine to 5 mg PO TID  Begin Florinef 0.1 mg daily   Stim test unremarkable    Generalized myoclonus   Improved   Appreciate Neurology input  Discontinued Keppra due to dizziness   Head CT negative for acute process  EEG suggestive of mild generalized encephalopathic process, possibly related to underlying structural brain injury vs metabolic abnormality  Monitor closely      Anticoagulation for LVAD, INR goal 1.5-2  Goal decreased d/t persistent hematuria   No ASA d/t hematuria  INR 1.5  Coumadin - 4 mg tonight    Epistaxis  Resolved      Acute Respiratory Failure post op  Improved with aggressive diuresis  Off supplemental O2  Pulmonary hygiene   Cont home CPAP- must use while sleeping   Schedule PET CT prior to discharge    Acute on CKD 3, atrophic left kidney  Appreciate Nephrology assistance  Watch Cr, stable  Hold diuretics   Avoid nephrotoxins, trend labs      CAD s/p CABG   Off ASA d/t hematuria  No BB d/t RV failure  Hold statin due to recent hepatic failure   LHC performed 11/13 - low likelihood of benefit from revascularization      Hx of VF arrest s/p BiV-ICD  No recent shocks  Keep K > 4 and Mg > 2   Mag ox 800 mg po BID  ICD reprogrammed to reduce diaphragmatic stimulation     PAF   Dig level 0.4-cont dig (62.5 mcg qMWF)  No BB due to RV failure   Repeat TFTs WNL  Interrogate ICD to eval AF burden      Hx of gout  Uric acid WNL    Acute blood loss anemia  Likely due to hematuria  Cont octreotide 25 mcg SQ TID   Fecal occult 12/14 negative, positive on 12/17  Appreciate urology recommendations  Hematuria improved  Monitor for now     Urinary retention, c/b serratia UTI and hematuria  Appreciate Urology consult   Repeat UA with cx negative 12/15   Cystoscopy performed 11/13 shows catheter induced cystitis   Pseudomonas aeruginosa positive UA culture (12/31/19) - on Cefepmine and levaquin     Malnutrition   Eating better  Prealbumin weekly - 13.3 on 1/5  Appreciate Nutritionist assistance  Diet as tolerated, encourage food from home, protein shakes  Intolerant of mirtazapine d/t tremors, confusion   Cont MVI add thiamine 100mg daily   Hold on DHT placement for now     COPD   Appreciate Pulmonology assistance   Continue nebs PRN       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 Bridget Dailey, will continue IV abx and wet to dry dressings.   Will likely require sternal wire after ~3 months from op date     Vocal cord paralysis  Improved  ENT recs appreciated  Speech therapy following - appreciate input    Anxiety  Change Klonopin to 0.5 mg PO BID    Depression  Continue Effexor XR 75mg daily  Monitor rhythm strips for prolonged QTc     Debility  Continue PT/OT     Bladder spasms  Received pyridium 100mg x4 doses  Oxybutynin 5mg Q6hr prn      Ppx  Protonix   PT/OT   +daily BM   PICC placed 11/22/19      Dispo:  Remain in CVSU at this time  Will need IP rehab. Not ready for discharge at this time        Patient seen and examined. Data and note reviewed. I have discussed and agree with the plans as noted. 71year old male with a history of ICM s/p LVAD as DT complicated by pseudomonas UTI and bacteremia, RV failure, refractory orthostatic hypotension. His CardioMems reading shows PAD 13 mmHg. Stim test is negative - no evidence of AI. Patient experienced syncope today despite midodrine. Will start low dose florinef and encourage increased fluid intake. Thank you for allowing us to participate in your patient's care. Mounika Sellers MD, [facility]  Chief of [place]iology, [address] [place] [address]  Office [phone]  Fax [phone]      LVAD INTERROGATION:  Device interrogated in person  Device function normal, normal flow, multiple PI events    LVAD   Pump Speed (RPM): 6600  Pump Flow (LPM): 5.8  MAP: 78  PI (Pulsitility Index): 4  Power: 6   Test: No  Back Up  at Bedside & Labeled: Yes  Power Module Test: No  Driveline Site Care: No  Driveline Dressing: Clean, Dry, and Intact  Outpatient: No  MAP in Therapeutic Range (Outpatient): Yes  Testing  Alarms Reviewed: No  Back up SC speed: 6600  Back up Low Speed Limit: 6000  Emergency Equipment with Patient?: Yes  Emergency procedures reviewed?: Yes  Drive line site inspected?: No  Drive line intergrity inspected?: Yes  Drive line dressing changed?: No    PHYSICAL EXAM:  Visit Vitals  BP 91/72 (BP 1 Location: Left arm, BP Patient Position: At rest) Pulse 90   Temp 98.1 °F (36.7 °C)   Resp 16   Ht 6' 2\" (1.88 m)   Wt 168 lb 14 oz (76.6 kg)   SpO2 97%   BMI 21.68 kg/m²       Physical Exam   Constitutional: He is oriented to person, place, and time. He appears well-developed. He appears cachectic. No distress. HENT:   Head: Normocephalic. Neck: Normal range of motion. Neck supple. No JVD present. Cardiovascular: Normal rate, regular rhythm and normal heart sounds. + VAD hum    Pulmonary/Chest: Effort normal and breath sounds normal. No tachypnea. No respiratory distress. Abdominal: Soft. Bowel sounds are normal. He exhibits no distension. Musculoskeletal: Normal range of motion. General: No edema. Neurological: He is alert and oriented to person, place, and time. Skin: Skin is warm and dry. Psychiatric: He has a normal mood and affect. His speech is normal and behavior is normal.   ~1 cm x 1 cm x 0.25 cm area of delayed closure on sternotomy. Site clean, no erythema or drainage noted. Subcutaneous tissue noted. Nursing note and vitals reviewed. REVIEW OF SYSTEMS:  Review of Systems   Constitutional: Positive for malaise/fatigue. Negative for chills and fever. HENT: Positive for hearing loss. Negative for nosebleeds. Eyes: Negative. Respiratory: Negative for cough and shortness of breath. Mild dyspnea   Cardiovascular: Negative for chest pain, palpitations, orthopnea and leg swelling. Orthostatic   Gastrointestinal: Negative for heartburn, nausea and vomiting. Genitourinary: Negative for dysuria and hematuria. Musculoskeletal: Negative. Neurological: Positive for dizziness and weakness. Negative for headaches. Mild dizziness - improving    Endo/Heme/Allergies: Does not bruise/bleed easily.        PAST MEDICAL HISTORY:  Past Medical History:   Diagnosis Date    Degenerative disc disease, lumbar     Heart failure ([facility] [address]     High cholesterol     Hypertension     Paroxysmal atrial fibrillation ([facility] [address] 2019    Spinal stenosis        PAST SURGICAL HISTORY:  Past Surgical History:   Procedure Laterality Date    COLONOSCOPY Left 2019    COLONOSCOPY at bedside performed by [facility] Hood [facility] at [facility] ENDOSCOPY    HX APPENDECTOMY      HX CORONARY ARTERY BYPASS GRAFT      triple    HX HERNIA REPAIR      HX IMPLANTABLE CARDIOVERTER DEFIBRILLATOR      AR CARDIOVERSION ELECTIVE ARRHYTHMIA EXTERNAL N/A 6/10/2019    EP CARDIOVERSION performed by Albin Pichardo MD at [address] CATH LAB    AR CARDIOVERSION ELECTIVE ARRHYTHMIA EXTERNAL N/A 2019    EP C[facility] [address] [facility]    AR INSJ/RPLCMT PERM DFB W/TRNSVNS LDS 1/DUAL CHMBR N/A 2019    INSERT ICD BIV MULTI performed by Simin Fierro MD at [place] [address] CATH LAB    AR TCAT IMPL WRLS P-ART PRS SNR L-T HEMODYN MNTR N/A 2019    IMPLANT HEART FAILURE MONITORING DEVICE performed by Sudha Spears MD at [address] [facility] [address] CATH LAB       FAMILY HISTORY:  Family History   Problem Relation Age of Onset    Lung Disease Mother     Hypertension Mother     Arthritis-osteo Mother     Heart Disease Mother     Heart Disease Father     Diabetes Father        SOCIAL HISTORY:  Social History     Socioeconomic History    Marital status:      Spouse name: Not on file    Number of children: Not on file    Years of education: Not on file    Highest education level: Not on file   Tobacco Use    Smoking status: Former Smoker     Last attempt to quit: 2010     Years since quittin.1    Smokeless tobacco: Never Used   Substance and Sexual Activity    Alcohol use: Not Currently     Comment: rarely    Drug use: Never   Other Topics Concern       LABORATORY RESULTS:     Labs Latest Ref Rng & Units 2020 2020 2020 2020 2020 2020 1/3/2020   WBC 4.1 - 11.1 K/uL 5.7 5.3 6.4 6.8 6.2 6.1 5.6   RBC 4.10 - 5.70 M/uL 3.09(L) 3.15(L) 2.99(L) 2.98(L) 3.10(L) 3.05(L) 2.97(L)   Hemoglobin 12.1 - 17.0 g/dL 9.1(L) 9.2(L) 8.7(L) 8.5(L) 8. 9(L) 8.8(L) 8.5(L)   Hematocrit 36.6 - 50.3 % 29. 3(L) 29. 9(L) 28. 4(L) 28. 3(L) 29. 3(L) 28. 5(L) 28. 0(L)   MCV 80.0 - 99.0 FL 94.8 94.9 95.0 95.0 94.5 93.4 94.3   Platelets 235 - 630 K/uL 138(L) 139(L) 138(L) 123(L) 127(L) 131(L) 124(L)   Lymphocytes 12 - 49 % - - - - - - -   Monocytes 5 - 13 % - - - - - - -   Eosinophils 0 - 7 % - - - - - - -   Basophils 0 - 1 % - - - - - - -   Albumin 3.5 - 5.0 g/dL 2. 4(L) 2. 4(L) 2. 3(L) 2. 5(L) 2. 4(L) 2. 4(L) 2. 5(L)   Calcium 8.5 - 10.1 MG/DL 9.1 8.9 8.7 8.9 9.0 8.3(L) 8.6   SGOT 15 - 37 U/L 21 18 18 18 19 21 18   Glucose 65 - 100 mg/dL 103(H) 108(H) 114(H) 104(H) 102(H) 105(H) 97   BUN 6 - 20 MG/DL 21(H) 23(H) 26(H) 23(H) 21(H) 20 19   Creatinine 0.70 - 1.30 MG/DL 1.11 1.08 1.04 1.11 1.09 1.03 1.02   Sodium 136 - 145 mmol/L 133(L) 133(L) 130(L) 131(L) 130(L) 132(L) 131(L)   Potassium 3.5 - 5.1 mmol/L 4.3 4.6 4.8 4.9 4.6 4.8 4.6   TSH 0.36 - 3.74 uIU/mL - - - - - - -   LDH 85 - 241 U/L 203 198 202 186 185 319(H) 166   Some recent data might be hidden     Lab Results   Component Value Date/Time    TSH 2.30 12/03/2019 03:29 AM    TSH 2.40 10/25/2019 07:39 PM    TSH 2.45 06/01/2019 04:16 AM       ALLERGY:  No Known Allergies     CURRENT MEDICATIONS:  Current Facility-Administered Medications   Medication Dose Route Frequency    warfarin (COUMADIN) tablet 4 mg  4 mg Oral ONCE    fludrocortisone (FLORINEF) tablet 0.1 mg  0.1 mg Oral DAILY    clonazePAM (KlonoPIN) tablet 0.5 mg  0.5 mg Oral BID    midodrine (PROAMITINE) tablet 5 mg  5 mg Oral TID WITH MEALS    hydrocortisone (CORTAID) 1 % cream   Topical TID PRN    sildenafil (pulm.hypertension) (REVATIO) tablet 20 mg  20 mg Oral TID    thiamine HCL (B-1) tablet 100 mg  100 mg Oral DAILY    venlafaxine-SR (EFFEXOR-XR) capsule 75 mg  75 mg Oral DAILY WITH BREAKFAST    levoFLOXacin (LEVAQUIN) 750 mg in D5W IVPB  750 mg IntraVENous Q24H    cefepime (MAXIPIME) 2 g in 0.9% sodium chloride (MBP/ADV) 100 mL  2 g IntraVENous Q8H    oxybutynin (DITROPAN) tablet 5 mg  5 mg Oral Q6H PRN    magnesium oxide (MAG-OX) tablet 800 mg  800 mg Oral BID    albumin human 5% (BUMINATE) solution 12.5 g  12.5 g IntraVENous DIALYSIS PRN    lidocaine (URO-JET/ GLYDO) 2 % jelly   Urethral PRN    epoetin yvonne-epbx (RETACRIT) injection 20,000 Units  20,000 Units SubCUTAneous Q MON, WED & FRI    albuterol-ipratropium (DUO-NEB) 2.5 MG-0.5 MG/3 ML  3 mL Nebulization Q6H PRN    therapeutic multivitamin (THERAGRAN) tablet 1 Tab  1 Tab Oral DAILY    alteplase (CATHFLO) 1 mg in sterile water (preservative free) 1 mL injection  1 mg InterCATHeter PRN    finasteride (PROSCAR) tablet 5 mg  5 mg Oral DAILY    diphenhydrAMINE (BENADRYL) capsule 25 mg  25 mg Oral QHS PRN    octreotide (SANDOSTATIN) injection 25 mcg  25 mcg SubCUTAneous TID    benzocaine-menthol (CEPACOL) lozenge 1 Lozenge  1 Lozenge Mucous Membrane PRN    digoxin (LANOXIN) tablet 0.0625 mg  62.5 mcg Oral Q MON, WED & FRI    pantoprazole (PROTONIX) tablet 40 mg  40 mg Oral ACB    allopurinol (ZYLOPRIM) tablet 100 mg  100 mg Oral DAILY    arformoterol (BROVANA) neb solution 15 mcg  15 mcg Nebulization BID RT    And    budesonide (PULMICORT) 500 mcg/2 ml nebulizer suspension  500 mcg Nebulization BID RT    artificial saliva (MOUTH KOTE) 1 Spray  1 Spray Oral PRN    lactobac ac& pc-s.therm-b.anim (TIAN Q/RISAQUAD)  1 Cap Oral DAILY    oxyCODONE IR (ROXICODONE) tablet 5 mg  5 mg Oral Q6H PRN    tamsulosin (FLOMAX) capsule 0.4 mg  0.4 mg Oral DAILY    Warfarin MD/NP dosing   Other PRN    sodium chloride (NS) flush 5-40 mL  5-40 mL IntraVENous Q8H    sodium chloride (NS) flush 5-40 mL  5-40 mL IntraVENous PRN    acetaminophen (TYLENOL) tablet 650 mg  650 mg Oral Q6H PRN    naloxone (NARCAN) injection 0.4 mg  0.4 mg IntraVENous PRN    ondansetron (ZOFRAN) injection 4 mg  4 mg IntraVENous Q4H PRN    bisacodyl (DULCOLAX) tablet 5 mg  5 mg Oral DAILY PRN    sucralfate (CARAFATE) tablet 1 g  1 g Oral AC&HS    influenza vaccine 2019-20 (6 mos+)(PF) (FLUARIX/FLULAVAL/FLUZONE QUAD) injection 0.5 mL  0.5 mL IntraMUSCular PRIOR TO DISCHARGE    hydrALAZINE (APRESOLINE) 20 mg/mL injection 20 mg  20 mg IntraVENous Q6H PRN    dextrose 10 % infusion 125-250 mL  125-250 mL IntraVENous PRN         Thank you for allowing me to participate in this patient's care.     Fabian Zhu NP  [address] [place] [address], Suite [facility]  Phone: [phone]  Fax: [phone]

## 2020-01-09 NOTE — PROGRESS NOTES
ID Progress Note  2020    Subjective:     Doing ok, seems to be feeling stronger--walked some more today    Objective:     Antibiotics:  1. Levaquin  2. Cefepime   3. Rifampin   4. Fluconazole  5. Vancomycin    6. Cefazolin   7. Zosyn       Vitals:   Visit Vitals  BP 91/72 (BP 1 Location: Left arm, BP Patient Position: At rest)   Pulse 90   Temp 98.1 °F (36.7 °C)   Resp 16   Ht 6' 2\" (1.88 m)   Wt 76.6 kg (168 lb 14 oz)   SpO2 97%   BMI 21.68 kg/m²        Tmax:  Temp (24hrs), Av.9 °F (36.6 °C), Min:97.6 °F (36.4 °C), Max:98.1 °F (36.7 °C)      Exam:  Lungs:  clear to auscultation bilaterally  Heart:  regular rate and rhythm  Abdomen:  soft, non-tender. Bowel sounds normal. No masses,  no organomegaly  Urine in neal is grossly clear      Labs:      Recent Labs     20  0537 20  0301 20  0258   WBC 5.7 5.3 6.4   HGB 9.1* 9.2* 8.7*   * 139* 138*   BUN 21* 23* 26*   CREA 1.11 1.08 1.04   SGOT 21 18 18    111 111   TBILI 0.5 0.6 0.5       Cultures:     No results found for: SDES  Lab Results   Component Value Date/Time    Culture result: NO GROWTH AFTER 18 HOURS 2020 02:04 PM    Culture result: NO GROWTH 2 DAYS 2020 11:53 AM    Culture result: LIGHT NORMAL RESPIRATORY TIAN 2019 04:18 PM       Radiology:     Line/Insert Date:           Assessment:     1. UTI--Serratia (2 biotypes) from culture and small amount of Enterococcus--now with Pseudomonas from culture of urine and blood  2. CHF/cardiomyopathy  3. ?aspiration event(s)  4. Renal insufficiency  5. Respiratory difficulties    Objective:     1. Continue current therapy  2.  Presence of LVAD in face of bacteremia may require longer course of therapy, or at least PO Rx for a time      Abner Mahan MD

## 2020-01-09 NOTE — PROGRESS NOTES
Problem: Self Care Deficits Care Plan (Adult)  Goal: *Acute Goals and Plan of Care (Insert Text)  Description    FUNCTIONAL STATUS PRIOR TO ADMISSION: Patient was modified independent using a single point cane for functional mobility. Patient able to shower and dress himself. However, patient required assistance for household management from his wife. HOME SUPPORT: The patient lived alone with wife to provide assistance. Occupational Therapy Goals:    Goals reviewed and upgraded as follows 1/3/2020  1. Patient will perform upper body dressing moderate assistance within 7 day(s) including management of LVAD. -goal met 1/3/2020; upgrade to MIN A  2. Patient will perform lower body dressing with minimal assistance within 7 day(s). -goal met; upgrade to [facility]  3. Patient will perform grooming seated EOB with supervision/setup within 7 day(s). -goal met; upgrade to standing with CGA   4. Patient will perform toilet transfers with minimum assistance within 7 day(s). -CONTINUE  5. Patient will perform all aspects of toileting with moderate assistance within 7 day(s). -CONTINUE    Goals reviewed and continued/modified as follows 12/26/2019  1. Patient will perform upper body dressing moderate assistance within 7 day(s) including management of LVAD. 2.  Patient will perform lower body dressing with minimal assistance within 7 day(s). (able to laotnia socks in bed, needs MOD A for dynamic standing balance)  3. Patient will perform grooming seated EOB with supervision/setup within 7 day(s). 4.  Patient will perform toilet transfers with minimum assistance within 7 day(s). -CONTINUE  5. Patient will perform all aspects of toileting with moderate assistance within 7 day(s). -CONTINUE  6. Patient will participate in upper extremity therapeutic exercise/activities with supervision/set-up-min A for 5 minutes within 7 day(s). -GOAL MET (discontinue)  7.   Patient will utilize energy conservation techniques during functional activities with verbal cues within 7 day(s). 8. Patient will verbalize 3/5 LVAD components with min verbal cues within 7 day (s). -CONTINUE    Goals reviewed and continued 12/19/2019  OT weekly reassessment 12/6/19: goals modified  1. Patient will perform upper body dressing moderate assistance within 7 day(s). 2.  Patient will perform lower body dressing with moderate assistance within 7 day(s). 3.  Patient will perform grooming seated EOB with minimum assistance within 7 day(s). 4.  Patient will perform toilet transfers with minimum assistance within 7 day(s). 5.  Patient will perform all aspects of toileting with moderate assistance within 7 day(s). 6.  Patient will participate in upper extremity therapeutic exercise/activities with supervision/set-up-min A for 5 minutes within 7 day(s). 7.  Patient will utilize energy conservation techniques during functional activities with verbal cues within 7 day(s). 8. Patient will verbalize 3/5 LVAD components with min verbal cues within 7 day (s). OT weekly reassessment 11/29/19: goals modified below  1. Patient will perform upper body dressing including LVAD switchovers with moderate assistance within 7 day(s). 2.  Patient will perform lower body dressing with minimal assistance within 7 day(s). 3.  Patient will perform grooming in standing with minimum assistance within 7 day(s). 4.  Patient will perform toilet transfers with minimum assistance within 7 day(s). 5.  Patient will perform all aspects of toileting with minimal assistance within 7 day(s). 6.  Patient will participate in upper extremity therapeutic exercise/activities with supervision/set-up for 5 minutes within 7 day(s). 7.  Patient will utilize energy conservation techniques during functional activities with verbal cues within 7 day(s). 8. Patient will verbalize LVAD terminology with verbal cues within 7 day (s).      Goals reviewed and continued/modified as follows 11/20/19 s/p LVAD implantation  1. Patient will perform upper body dressing including LVAD switchovers with moderate assistance within 7 day(s). 2.  Patient will perform lower body dressing with minimal assistance within 7 day(s). 3.  Patient will perform grooming with supervision/setup within 7 day(s). 4.  Patient will perform toilet transfers supervision/setupmodified independence within 7 day(s). 5.  Patient will perform all aspects of toileting with minimal assistance within 7 day(s). 6.  Patient will participate in upper extremity therapeutic exercise/activities with supervision/set-up for 5 minutes within 7 day(s). 7.  Patient will utilize energy conservation techniques during functional activities with verbal cues within 7 day(s). 8. Patient will verbalize LVAD terminology with verbal cues within 7 day (s). Goals reviewed and continued/modified as follows 11/12/19  1. Patient will perform bathing with supervision/set-up within 7 day(s). 2.  Patient will perform lower body dressing with supervision/set-up within 7 day(s). 3.  Patient will perform grooming with modified independence within 7 day(s). 4.  Patient will perform toilet transfers with modified independence within 7 day(s). 5.  Patient will perform all aspects of toileting with supervision/set-up within 7 day(s). 6.  Patient will participate in upper extremity therapeutic exercise/activities with supervision/set-up for 5 minutes within 7 day(s). 7.  Patient will utilize energy conservation techniques during functional activities with verbal cues within 7 day(s). 8. Patient will verbalize LVAD terminology with verbal cues within 7 day (s) in preparation for implant. Continue all goals 10/30/19 post re-eval for Impella removal   Initiated 10/28/2019  1. Patient will perform bathing with supervision/set-up within 7 day(s). 2.  Patient will perform lower body dressing with supervision/set-up within 7 day(s).   3. Patient will perform grooming with modified independence within 7 day(s). 4.  Patient will perform toilet transfers with modified independence within 7 day(s). 5.  Patient will perform all aspects of toileting with supervision/set-up within 7 day(s). 6.  Patient will participate in upper extremity therapeutic exercise/activities with supervision/set-up for 5 minutes within 7 day(s). 7.  Patient will utilize energy conservation techniques during functional activities with verbal cues within 7 day(s). Outcome: Progressing Towards Goal   OCCUPATIONAL THERAPY TREATMENT  Patient: Prince Carias (75 y.o. male)  Date: 1/9/2020  Diagnosis: Acute decompensated heart failure ([facility] [address] [I50.9]   <principal problem not specified>  Procedure(s) (LRB):  LEFT BRACHIAL THROMBECTOMY (Left) 45 Days Post-Op  Precautions: Fall, Sternal(LVAD )  Chart, occupational therapy assessment, plan of care, and goals were reviewed. ASSESSMENT  Patient continues with skilled OT services and is progressing towards goals. Patient continues to require verbal cues for \"move in the tube\" principles and noted patient with syncopal event this morning with [facility] staff. Patient continues to present with generalized weakness, decreased endurance, decreased activity tolerance, impaired balance, and impaired coordination however patient is highly motivated to regain functional independence. Patient's MAP was 78 in supine at initiation of session. Patient transitioned to EOB w/ c/o some dizziness with MAP noted 72. OT facilitated 6/6 BUE cardiac exercises seated EOB and patient performed grooming tasks. Patient was then MIN A for sidestepping to [facility] and returned to supine. Continue to recommend IPR to maximize patient safety and independence with ADL transfers and tasks.      Patient is verbalizing and is demonstrating understanding of mindful-based movements (\"move in the tube\") principles of keeping UEs proximal to ribcage to prevent lateral pull on the sternum during load-bearing activities with verbal cues required for compliance. Current Level of Function Impacting Discharge (ADLs): MIN A    Other factors to consider for discharge: LVAD HM III         PLAN :  Patient continues to benefit from skilled intervention to address the above impairments. Continue treatment per established plan of care. to address goals. Recommend with staff: OOB x 3 to chair; BUE cardiac exercises    Recommend next OT session: standing tolerance for standing grooming tasks    Recommendation for discharge: (in order for the patient to meet his/her long term goals)  Therapy 3 hours per day 5-7 days per week    This discharge recommendation:  Has been made in collaboration with the attending provider and/or case management    IF patient discharges home will need the following DME: to be determined (TBD)       SUBJECTIVE:   Patient stated I feel a little dizzy.     OBJECTIVE DATA SUMMARY:   Cognitive/Behavioral Status:  Neurologic State: Alert  Orientation Level: Oriented X4  Cognition: Appropriate for age attention/concentration  Perception: Appears intact  Perseveration: No perseveration noted  Safety/Judgement: Decreased insight into deficits    Functional Mobility and Transfers for ADLs:  Bed Mobility:  Supine to Sit: Minimum assistance  Sit to Supine: Moderate assistance(A for BLE)    Transfers:  Sit to Stand: Minimum assistance          Balance:  Sitting: Impaired; Without support  Sitting - Static: Good (unsupported)  Sitting - Dynamic: Fair (occasional)  Standing: Impaired; With support  Standing - Static: Constant support; Fair  Standing - Dynamic : Constant support;Poor    ADL Intervention:       Grooming  Brushing Teeth: Contact guard assistance(seated EOB)                             Cognitive Retraining  Safety/Judgement: Decreased insight into deficits    Patient instructed and educated on mindful movement principles based on Move in The Tube concept to include maintaining bilateral elbows close to rib cage when performing any load-bearing activity such as getting in/out of bed, pushing up from a chair, opening a door, or lifting a box. Patient was given a handout with diagrams of each correct/incorrect method of performing each of the above tasks. Patient instructed and encouraged to mobilize bilateral upper extremities outside the tube when doing any non-load bearing activity such as washing hair/body, brushing teeth, retrieving clothing items, or scratching your back. Therapeutic Exercises:   Patient instructed on the benefits and demonstrated cardiac exercises while seated with Stand-by assistance. Instructed and indicated understanding on how to progress reps, sets against gravity, pacing through progressive muscle strengthening standing based on surgeon clearance for more weight in prep for basic and instrumental ADLs. Instruction on the use of household items in place of weights as needed.     CARDIAC   EXERCISE    Sets    Reps    Active  Active Assist    Passive  Self ROM    Comments    Shoulder flexion  1  5   [x]                            []                             []                             []                                Shoulder abduction  1  5  [x]                             []                             []                             []                                Scapular elevation  1  5  [x]                             []                              []                             []                                Scapular retraction  1  5  [x]                             []                             []                             []                                Trunk rotation  1  5  [x]                             []                             []                             []                                Trunk sidebending  1  5  [x]                             []                              [] []                                        Activity Tolerance:   Fair, requires rest breaks, and signs and symptoms of orthostatic hypotension  Please refer to the flowsheet for vital signs taken during this treatment.     After treatment patient left in no apparent distress:   Supine in bed and Call bell within reach    COMMUNICATION/COLLABORATION:   The patients plan of care was discussed with: Physical Therapist and Registered Nurse    Yury Jones  Time Calculation: 33 mins

## 2020-01-09 NOTE — PROGRESS NOTES
[facility] VAD/Heart Failure Progress Note    Admit Date: 10/25/2019  POD:  45 Days Post-Op    Procedure:  Procedure(s):  LEFT BRACHIAL THROMBECTOMY        Subjective:   Mild dyspnea, fatigue, and weakness; mild sternal wound      Objective:   Vitals:  Blood pressure 91/72, pulse 90, temperature 98.1 °F (36.7 °C), resp. rate 16, height 6' 2\" (1.88 m), weight 168 lb 14 oz (76.6 kg), SpO2 97 %.   Temp (24hrs), Av.9 °F (36.6 °C), Min:97.6 °F (36.4 °C), Max:98.1 °F (36.7 °C)    Hemodynamics:   CO: CO (l/min): 5.8 l/min   CI: CI (l/min/m2): 2.8 l/min/m2   CVP: CVP (mmHg): 4 mmHg (19 1645)   SVR: SVR (dyne*sec)/cm5: 1080 (dyne*sec)/cm5 (19 7863)   PAP Systolic: PAP Systolic: 34 (20/48/54 3798)   PAP Diastolic: PAP Diastolic: 13 (50/72/81 7417)   PVR:     SV02: SVO2 (%): 67 % (19 1300)   SCV02: SCVO2 (%): 75 % (10/29/19 1900)    VAD Interrogation: LVAD (Heartmate)  Pump Speed (RPM): 6600  Pump Flow (LPM): 6  Chatter in Lines: No  PI (Pulsitility Index): 3.3  Power: 5.9  MAP: 80   Test: Yes  Back Up  at Bedside & Labeled: Yes  Power Module Test: Yes  Driveline Site Care: No  Driveline Dressing: Clean, Dry, and Intact    EKG/Rhythm:      Extubation Date / Time:     CT Output:     Ventilator:  Ventilator Volumes  Vt Set (ml): 550 ml (19 08)  Vt Exhaled (Machine Breath) (ml): 639 ml (19 08)  Vt Spont (ml): 437 ml (19 1035)  Ve Observed (l/min): 7.25 l/min (19 1035)    Oxygen Therapy:  Oxygen Therapy  O2 Sat (%): 97 % (20 1457)  Pulse via Oximetry: 92 beats per minute (20 0937)  O2 Device: Room air (20 1118)  PEEP/CPAP (cm H2O): 1 cm H20 (20 1013)  O2 Flow Rate (L/min): 1 l/min (20 0400)  FIO2 (%): 21 % (20 1047)    CXR:    Admission Weight: Last Weight   Weight: 192 lb 10.9 oz (87.4 kg) Weight: 168 lb 14 oz (76.6 kg)     Intake / Output / Drain:  Current Shift:  0701 -  1900  In: 861 [P.O.:650]  Out: 1300 [Urine:1300]  Last 24 hrs.:     Intake/Output Summary (Last 24 hours) at 1/9/2020 1526  Last data filed at 1/9/2020 1502  Gross per 24 hour   Intake 1344 ml   Output 2950 ml   Net -1606 ml             No results for input(s): CPK, CKMB, TROIQ in the last 72 hours.   Recent Labs     01/09/20  0537 01/08/20  0301 01/07/20  0258   * 133* 130*   K 4.3 4.6 4.8   CO2 24 23 25   BUN 21* 23* 26*   CREA 1.11 1.08 1.04   * 108* 114*   MG 1.8 1.8 1.8   WBC 5.7 5.3 6.4   HGB 9.1* 9.2* 8.7*   HCT 29.3* 29.9* 28.4*   * 139* 138*     Recent Labs     01/09/20  0537 01/08/20  0301 01/07/20  0258   INR 1.7* 1.5* 1.4*   PTP 16.9* 15.1* 14.2*   APTT 34.5* 33.7* 33.3*     No lab exists for component: PBNP        Current Facility-Administered Medications:     warfarin (COUMADIN) tablet 4 mg, 4 mg, Oral, ONCE, Nicho Herrera, NP    fludrocortisone (FLORINEF) tablet 0.1 mg, 0.1 mg, Oral, DAILY, Mounika Altman MD, 0.1 mg at 01/09/20 1123    clonazePAM (KlonoPIN) tablet 0.5 mg, 0.5 mg, Oral, BID, Polliard, Eulah Opal T, NP, 0.5 mg at 01/09/20 1123    midodrine (PROAMITINE) tablet 5 mg, 5 mg, Oral, TID WITH MEALS, Polliard, Eulah Opal T, NP, 5 mg at 01/09/20 1123    hydrocortisone (CORTAID) 1 % cream, , Topical, TID PRN, Mounika Elkins MD    sildenafil (pulm.hypertension) (REVATIO) tablet 20 mg, 20 mg, Oral, TID, Mounika Altman MD, 20 mg at 01/09/20 0840    thiamine HCL (B-1) tablet 100 mg, 100 mg, Oral, DAILY, Jose Miguel ACUNA NP, 100 mg at 01/09/20 0840    venlafaxine-SR (EFFEXOR-XR) capsule 75 mg, 75 mg, Oral, DAILY WITH BREAKFAST, Jose Miguel ACUNA NP, 75 mg at 01/09/20 0841    levoFLOXacin (LEVAQUIN) 750 mg in D5W IVPB, 750 mg, IntraVENous, Q24H, Juan C Olivares MD, Last Rate: 100 mL/hr at 01/09/20 1429, 750 mg at 01/09/20 1429    cefepime (MAXIPIME) 2 g in 0.9% sodium chloride (MBP/ADV) 100 mL, 2 g, IntraVENous, Q8H, Juan C Olivares MD, Last Rate: 200 mL/hr at 01/09/20 0840, 2 g at 01/09/20 0840    oxybutynin (DITROPAN) tablet 5 mg, 5 mg, Oral, Q6H PRN, Shana Sims, NP, 5 mg at 01/01/20 1204    magnesium oxide (MAG-OX) tablet 800 mg, 800 mg, Oral, BID, Dodson Elzbieta E, NP, 800 mg at 01/09/20 0840    albumin human 5% (BUMINATE) solution 12.5 g, 12.5 g, IntraVENous, DIALYSIS PRN, Cheyenne NARVAEZ MD    lidocaine (URO-JET/ GLYDO) 2 % jelly, , Urethral, PRN, Mounika Altman MD    epoetin yvonne-epbx (RETACRIT) injection 20,000 Units, 20,000 Units, SubCUTAneous, Q MON, WED & FRI, Logan Kincaid MD, 20,000 Units at 01/08/20 2127    albuterol-ipratropium (DUO-NEB) 2.5 MG-0.5 MG/3 ML, 3 mL, Nebulization, Q6H PRN, Alexa Lopez MD, 3 mL at 12/25/19 1407    therapeutic multivitamin (THERAGRAN) tablet 1 Tab, 1 Tab, Oral, DAILY, Shana Sims, NP, 1 Tab at 01/09/20 0840    alteplase (CATHFLO) 1 mg in sterile water (preservative free) 1 mL injection, 1 mg, InterCATHeter, PRN, Mounika Altman MD, 1 mg at 01/08/20 0919    finasteride (PROSCAR) tablet 5 mg, 5 mg, Oral, DAILY, Maryjo Hodgkin, MD, 5 mg at 01/09/20 0840    diphenhydrAMINE (BENADRYL) capsule 25 mg, 25 mg, Oral, QHS PRN, Patrick Herrera NP, 25 mg at 01/05/20 2325    octreotide (SANDOSTATIN) injection 25 mcg, 25 mcg, SubCUTAneous, TID, Patrick Herrera NP, 25 mcg at 01/09/20 0955    benzocaine-menthol (CEPACOL) lozenge 1 Lozenge, 1 Lozenge, Mucous Membrane, PRN, Soraya Herrera, NP    digoxin (LANOXIN) tablet 0.0625 mg, 62.5 mcg, Oral, Q MON, WED & FRI, Ceciliaiard, Altagraciaerchioma Colleen T, NP, 0.0625 mg at 01/08/20 2127    pantoprazole (PROTONIX) tablet 40 mg, 40 mg, Oral, ACB, Katelynnd, Altagraciaerchioma FlorianColleen T, NP, 40 mg at 01/09/20 0713    allopurinol (ZYLOPRIM) tablet 100 mg, 100 mg, Oral, DAILY, Polliard, Altagraciaerchioma Colleen T, NP, 100 mg at 01/09/20 0840    arformoterol (BROVANA) neb solution 15 mcg, 15 mcg, Nebulization, BID RT, 15 mcg at 01/09/20 0936 **AND** budesonide (PULMICORT) 500 mcg/2 ml nebulizer suspension, 500 mcg, Nebulization, BID RT, Vicente Herrera NP, 500 mcg at 01/09/20 0936    artificial saliva (MOUTH KOTE) 1 Spray, 1 Spray, Oral, PRN, Vicente Herrera NP, 1 Spray at 12/12/19 1452    lactobac ac& pc-s.therm-b.anim (TIAN Q/RISAQUAD), 1 Cap, Oral, DAILY, Maia Love MD, 1 Cap at 01/09/20 0840    oxyCODONE IR (ROXICODONE) tablet 5 mg, 5 mg, Oral, Q6H PRN, Kathie Bermudez MD, 5 mg at 12/30/19 0405    tamsulosin (FLOMAX) capsule 0.4 mg, 0.4 mg, Oral, DAILY, Esperanza Osborne MD, 0.4 mg at 01/09/20 0840    Warfarin MD/NP dosing, , Other, PRN, Rigoberto Altman MD    sodium chloride (NS) flush 5-40 mL, 5-40 mL, IntraVENous, Q8H, Mari PAULINO MD, 10 mL at 01/09/20 0713    sodium chloride (NS) flush 5-40 mL, 5-40 mL, IntraVENous, PRN, Kathie Bermudez MD, 10 mL at 01/02/20 2342    acetaminophen (TYLENOL) tablet 650 mg, 650 mg, Oral, Q6H PRN, Kathie Bermudez MD, 650 mg at 01/02/20 2213    naloxone (NARCAN) injection 0.4 mg, 0.4 mg, IntraVENous, PRN, Kathie Bermudez MD    ondansetron [facility]) injection 4 mg, 4 mg, IntraVENous, Q4H PRN, Kathie Bermudez MD, 4 mg at 01/03/20 1748    bisacodyl (DULCOLAX) tablet 5 mg, 5 mg, Oral, DAILY PRN, Kathie Bermudez MD, 5 mg at 12/22/19 1533    sucralfate (CARAFATE) tablet 1 g, 1 g, Oral, AC&HS, Kathie Bermudez MD, 1 g at 01/09/20 1123    influenza vaccine 2019-20 (6 mos+)(PF) (FLUARIX/FLULAVAL/FLUZONE QUAD) injection 0.5 mL, 0.5 mL, IntraMUSCular, PRIOR TO DISCHARGE, Rigoberto Altman MD    hydrALAZINE (APRESOLINE) 20 mg/mL injection 20 mg, 20 mg, IntraVENous, Q6H PRN, Shana Sims NP, 20 mg at 12/10/19 0541    dextrose 10 % infusion 125-250 mL, 125-250 mL, IntraVENous, PRN, Mounika Altman MD, Stopped at 11/18/19 0700    A/P     S/P LVAD - good flows  Need for anti-coagulation - coumadin  DM - insulin  RV dysfunction - milrinone, diuretics      Risk of morbidity and mortality - high  Medical decision making - high complexity    Signed By: Greer Jaramillo MD

## 2020-01-10 NOTE — PROGRESS NOTES
NUTRITION COMPLETE ASSESSMENT      Interventions:    - CALORIE COUNT X 3 DAYS completed- see below(1/3/20 through 1/5/20)  - Glucerna (vanilla) TID - pt to drink between meals  - Cafeteria menu provided  - reviewed patient menu for food preferences- adjusted  - snacks adjusted (cubed cheese and grapes)  Family also planning on bringing foods from home. RECOMMENDATIONS:   1. Continue Regular diet- encourage PO intakes. Adjusted ONS/snacks and breakfast meals. add daily MVI    2. Continue to encourage oral intake - family may bring foods from home     Please document  Severe Acute on Chronic Protein Calorie Malnutrition in patient diagnoses. Patient meets criteria for as evidenced by:   ASPEN Malnutrition Criteria  Acute Illness, Chronic Illness, or Social/Enviornmental: Acute illness  Energy Intake: <75% est energy req for > 7 days  Weight Loss: Greater than 7.5% x 3 mos  Body Fat Loss: Moderate  Muscle Mass Loss: Moderate  Fluid Accumulation: Moderate  ASPEN Malnutrition Score - Acute Illness: 19  Acute Illness - Malnutrition Diagnosis: Severe malnutrition. Interventions/Plan:   Food/Nutrient Delivery:  (-) Commercial supplement(see below)   (d/c marinol) Initiate enteral nutrition    Assessment:   Reason for Assessment: Reassessment    Diet: regular + snack  Supplements: Glucerna 3x/day   Nutritionally Significant Medications: [x] Reviewed & Includes: insulin, FloraQ, MagOx, Carafate, Flomax, MultiVit, Coumadin, Protonix,     Meal intake:   Patient Vitals for the past 168 hrs:   % Diet Eaten   01/10/20 1200 100 %   01/10/20 0813 75 %   01/09/20 1502 100 %   01/09/20 0840 75 %   01/08/20 1407 75 %   01/08/20 0739 75 %   01/07/20 1511 50 %   01/07/20 0756 75 %   01/06/20 1157 75 %   01/06/20 0748 50 %   01/05/20 1746 50 %   01/05/20 1112 75 %   01/05/20 0739 50 %   01/04/20 1147 75 %   01/04/20 0800 50 %     Subjective: Pt and spouse in room.  Discussed this weeks PO intakes, progress, snacks/ONS adjustments. Pt eating ~75% of meals, sometimes 100%. Pt drinking Glucerna 3 per day. Pt is getting tired of Ensure and wants to switch to Glucerna for variety. Took pt order for dinner. Spouse to bring in additional food for lunch today. Took dinner order and discussed meal times. Pt appetite is mildly improving and pt doing a much better job of eating more. Objective:  Pt admitted for CHF. PMHx: HTN, CKD, chronic systolic heart failure, depression, others noted. S/p removal of impella (placed 10/29) and LVAD placement on 11/18. Bacteremia with likely urinary source noted, abx rx. Lethargic today. Milrinone drip off, midodrine rx. Low sodium and chloride continue but stable. Renal following for JUAN J on CKD. Pt continues to meet % of calorie and protein needs. Weekly PAB being checked but not indicator of nutrition status with pro-inflammatory condition of CHF. Do not recommend checking as a way to evaluate nutrition. Does however meet criteria for severe malnutrition with: (1) Severe wt loss over past 6 months (2) Severe muscle/fat wasting (3) Inadequate oral intake. Estimated Nutrition Needs:   Kcals/day: 2045 Kcals/day(1887-2045kcal)  Protein: 75 g(75-90g (1-1.2g/kg - CKD))  Fluid: 1887 ml(1ml/kcal)  Based On: [place] Jeor(x 1.2-1.3)  Weight Used: Actual wt(74.7kg)    Pt expected to meet estimated nutrient needs:  [x]   Yes []  No [] Unable to predict at this time  Nutrition Diagnosis:   1. Malnutrition related to CHF vs depression vs malignancy as evidenced by >10% weight loss x 6 months; severe muscle/fat wasting; consuming >70% needs orally  - continues  2. Inadequate oral intake related to poor appetite as evidenced by less than 50% most meals consumed; only 1-2 supplements/day  - improving  3.  Swallowing difficulty(resolved) related to weakness with moderate pharyngeal dysphagia as evidenced by regular with thin liquids  - resolved  Goals:     Initiation of EN to meet 75% needs in 48hrs     Monitoring & Evaluation:    - Total energy intake, Liquid meal replacement, Enteral/parenteral nutrition intake   - Weight/weight change     Previous Nutrition Goals Met:   Progressing  Previous Recommendations:    Yes    Education & Discharge Needs:   [x] None Identified   [] Identified and addressed    [x] Participated in care plan, discharge planning, and/or interdisciplinary rounds        Cultural, Baptism and ethnic food preferences identified: None    Skin Integrity: []Intact  [x]Other: sternal wound  Edema: []None [x]Other: trace  Last BM: 1/8  Food Allergies: [x]None []Other  Diet Restrictions: Cultural/Yarsani Preference(s): None     Anthropometrics:    Weight Loss Metrics 1/10/2020 10/25/2019 10/25/2019 10/25/2019 9/30/2019 9/18/2019 9/16/2019   Today's Wt 170 lb 6.7 oz - 193 lb 6.4 oz - 199 lb 14.4 oz 196 lb 199 lb   BMI - 21.88 kg/m2 - 24.83 kg/m2 25.67 kg/m2 25.16 kg/m2 25.55 kg/m2      Weight Source: Bed  Height: 6' 2\" (188 cm),    Body mass index is 21.88 kg/m².      IBW : 86.2 kg (190 lb), % IBW (Calculated): 96.32 %   ,      Labs:    Lab Results   Component Value Date/Time    Sodium 136 01/10/2020 03:41 AM    Potassium 3.9 01/10/2020 03:41 AM    Chloride 108 01/10/2020 03:41 AM    CO2 22 01/10/2020 03:41 AM    Glucose 99 01/10/2020 03:41 AM    BUN 19 01/10/2020 03:41 AM    Creatinine 0.88 01/10/2020 03:41 AM    Calcium 7.8 (L) 01/10/2020 03:41 AM    Magnesium 1.6 01/10/2020 03:41 AM    Phosphorus 3.3 11/27/2019 04:18 AM    Albumin 2.1 (L) 01/10/2020 03:41 AM     Lab Results   Component Value Date/Time    Hemoglobin A1c 6.6 (H) 10/25/2019 02:56 PM       Soraya Max, [id number] [facility]

## 2020-01-10 NOTE — PROGRESS NOTES
0730: Bedside shift change report given to Islip Terracerylan Dominguez (oncoming nurse) by Cirilo Becerra RN (offgoing nurse). Report included the following information SBAR and Kardex. Dressing changed on sternotomy. LVAD dressing changed per sterile procedure. 1930: Bedside shift change report given to Thee Gilman (oncoming nurse) by Deniz Murphy RN (offgoing nurse). Report included the following information SBAR and Kardex. Problem: Falls - Risk of  Goal: *Absence of Falls  Description  Document Madelyn Haquemilka Fall Risk and appropriate interventions in the flowsheet. Outcome: Progressing Towards Goal  Note: Fall Risk Interventions:  Mobility Interventions: Patient to call before getting OOB, PT Consult for mobility concerns, PT Consult for assist device competence, Utilize walker, cane, or other assistive device, Communicate number of staff needed for ambulation/transfer    Mentation Interventions: Adequate sleep, hydration, pain control    Medication Interventions: Assess postural VS orthostatic hypotension, Teach patient to arise slowly, Patient to call before getting OOB    Elimination Interventions: Call light in reach, Patient to call for help with toileting needs, Toilet paper/wipes in reach    History of Falls Interventions: Consult care management for discharge planning         Problem: Pressure Injury - Risk of  Goal: *Prevention of pressure injury  Description  Document Mich Scale and appropriate interventions in the flowsheet.   Outcome: Progressing Towards Goal  Note: Pressure Injury Interventions:  Sensory Interventions: Assess changes in LOC    Moisture Interventions: Absorbent underpads    Activity Interventions: Increase time out of bed    Mobility Interventions: HOB 30 degrees or less, Pressure redistribution bed/mattress (bed type), PT/OT evaluation    Nutrition Interventions: Document food/fluid/supplement intake    Friction and Shear Interventions: Apply protective barrier, creams and emollients Problem: Patient Education: Go to Patient Education Activity  Goal: Patient/Family Education  Outcome: Progressing Towards Goal     Problem: Heart Failure: Discharge Outcomes  Goal: *Demonstrates ability to perform prescribed activity without shortness of breath or discomfort  Outcome: Progressing Towards Goal

## 2020-01-10 NOTE — PROGRESS NOTES
[facility]   [address] [facility], [address], [facility]  Phone: [phone]   [phone]       Nephrology Progress Note  Meg Vera     [birth year]     [id number]  Date of Admission : 10/25/2019  01/10/20    CC: Follow up for JUAN J, CKD, Hyponatremia      Assessment and Plan   JUAN J:  - Cr < 1 now   - despite BNP rising, no apparent fluid overload   - agree w/ Florinef and hold off further IV albumin boluses     Pseudomonal UTI and bacteremia:  - from cultures on 12/29. F/u Cx pending from today   - on cefepime per ID    Orthostatic hypotension w/ syncope:  - on midodrine and florinef    - stim test ok     Hyponatremia :  - Na stable at 136    Gross hematuria, BPH w/ retention:  - off CBI pre VAD. 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 CT in place    L arm clot s/p thrombectomy on 11/25    JOSE on CPAP      PAF s/p DCCV 6/19     Anemia:  - from blood loss s/p multiple blood products + IV iron  - cont PAVEL- lower the dose to weekly  when Hb > 10      Interval History:    Seen and examined. He is sleeping.      Review of Systems: unable to perform ROS     Current Medications:   Current Facility-Administered Medications   Medication Dose Route Frequency    fludrocortisone (FLORINEF) tablet 0.1 mg  0.1 mg Oral DAILY    clonazePAM (KlonoPIN) tablet 0.5 mg  0.5 mg Oral BID    midodrine (PROAMITINE) tablet 5 mg  5 mg Oral TID WITH MEALS    hydrocortisone (CORTAID) 1 % cream   Topical TID PRN    sildenafil (pulm.hypertension) (REVATIO) tablet 20 mg  20 mg Oral TID    thiamine HCL (B-1) tablet 100 mg  100 mg Oral DAILY    venlafaxine-SR (EFFEXOR-XR) capsule 75 mg  75 mg Oral DAILY WITH BREAKFAST    levoFLOXacin (LEVAQUIN) 750 mg in D5W IVPB  750 mg IntraVENous Q24H    cefepime (MAXIPIME) 2 g in 0.9% sodium chloride (MBP/ADV) 100 mL  2 g IntraVENous Q8H    oxybutynin (DITROPAN) tablet 5 mg  5 mg Oral Q6H PRN    magnesium oxide (MAG-OX) tablet 800 mg  800 mg Oral BID    albumin human 5% (BUMINATE) solution 12.5 g  12.5 g IntraVENous DIALYSIS PRN    lidocaine (URO-JET/ GLYDO) 2 % jelly   Urethral PRN    epoetin yvonne-epbx (RETACRIT) injection 20,000 Units  20,000 Units SubCUTAneous Q MON, WED & FRI    albuterol-ipratropium (DUO-NEB) 2.5 MG-0.5 MG/3 ML  3 mL Nebulization Q6H PRN    therapeutic multivitamin (THERAGRAN) tablet 1 Tab  1 Tab Oral DAILY    alteplase (CATHFLO) 1 mg in sterile water (preservative free) 1 mL injection  1 mg InterCATHeter PRN    finasteride (PROSCAR) tablet 5 mg  5 mg Oral DAILY    diphenhydrAMINE (BENADRYL) capsule 25 mg  25 mg Oral QHS PRN    octreotide (SANDOSTATIN) injection 25 mcg  25 mcg SubCUTAneous TID    benzocaine-menthol (CEPACOL) lozenge 1 Lozenge  1 Lozenge Mucous Membrane PRN    digoxin (LANOXIN) tablet 0.0625 mg  62.5 mcg Oral Q MON, WED & FRI    pantoprazole (PROTONIX) tablet 40 mg  40 mg Oral ACB    allopurinol (ZYLOPRIM) tablet 100 mg  100 mg Oral DAILY    arformoterol (BROVANA) neb solution 15 mcg  15 mcg Nebulization BID RT    And    budesonide (PULMICORT) 500 mcg/2 ml nebulizer suspension  500 mcg Nebulization BID RT    artificial saliva (MOUTH KOTE) 1 Spray  1 Spray Oral PRN    lactobac ac& pc-s.therm-b.anim (TIAN Q/RISAQUAD)  1 Cap Oral DAILY    oxyCODONE IR (ROXICODONE) tablet 5 mg  5 mg Oral Q6H PRN    tamsulosin (FLOMAX) capsule 0.4 mg  0.4 mg Oral DAILY    Warfarin MD/NP dosing   Other PRN    sodium chloride (NS) flush 5-40 mL  5-40 mL IntraVENous Q8H    sodium chloride (NS) flush 5-40 mL  5-40 mL IntraVENous PRN    acetaminophen (TYLENOL) tablet 650 mg  650 mg Oral Q6H PRN    naloxone (NARCAN) injection 0.4 mg  0.4 mg IntraVENous PRN    ondansetron (ZOFRAN) injection 4 mg  4 mg IntraVENous Q4H PRN    bisacodyl (DULCOLAX) tablet 5 mg  5 mg Oral DAILY PRN    sucralfate (CARAFATE) tablet 1 g  1 g Oral AC&HS    influenza vaccine 2019-20 (6 mos+)(PF) (FLUARIX/FLULAVAL/FLUZONE QUAD) injection 0.5 mL  0.5 mL IntraMUSCular PRIOR TO DISCHARGE    hydrALAZINE (APRESOLINE) 20 mg/mL injection 20 mg  20 mg IntraVENous Q6H PRN    dextrose 10 % infusion 125-250 mL  125-250 mL IntraVENous PRN      No Known Allergies    Objective:  Vitals:    Vitals:    01/10/20 0400 01/10/20 0522 01/10/20 0635 01/10/20 0813   BP:       Pulse: 91 91  90   Resp: 18 18  18   Temp: 97.6 °F (36.4 °C) 97.6 °F (36.4 °C)  98 °F (36.7 °C)   SpO2: 99% 99%  99%   Weight:   77.3 kg (170 lb 6.7 oz)    Height:         Intake and Output:  01/10 0701 - 01/10 1900  In: 240 [P.O.:240]  Out: -   01/08 1901 - 01/10 0700  In: 2094 [P.O.:2094]  Out: [facility]    Physical Examination:    General: Sleeping   Neck:  No lines   Resp:  CTA b/l  CV:  VAD sounds; No LE edema  GI:  Soft and non-tender; no distension  Neurologic:  sleeping  :  No Lizama    [x]    High complexity decision making was performed  [x]    Patient is at high-risk of decompensation with multiple organ involvement    Lab Data Personally Reviewed: I have reviewed all the pertinent labs, microbiology data and radiology studies during assessment.     Recent Labs     01/10/20  0341 01/09/20  0537 01/08/20  0301    133* 133*   K 3.9 4.3 4.6    104 103   CO2 22 24 23   GLU 99 103* 108*   BUN 19 21* 23*   CREA 0.88 1.11 1.08   CA 7.8* 9.1 8.9   MG 1.6 1.8 1.8   ALB 2.1* 2.4* 2.4*   SGOT 18 21 18   ALT 18 17 18   INR 1.8* 1.7* 1.5*     Recent Labs     01/10/20  0341 01/09/20  0537 01/08/20  0301   WBC 4.9 5.7 5.3   HGB 8.2* 9.1* 9.2*   HCT 27.2* 29.3* 29.9*   * 138* 139*     No results found for: SDES  Lab Results   Component Value Date/Time    Culture result: NO GROWTH 2 DAYS 01/08/2020 02:04 PM    Culture result: NO GROWTH 3 DAYS 01/07/2020 11:53 AM    Culture result: LIGHT NORMAL RESPIRATORY TIAN 12/31/2019 04:18 PM    Culture result: (A) 12/31/2019 03:24 PM     PSEUDOMONAS AERUGINOSA ISOLATED FROM 2 OF 4 BOTTLES DRAWN, EACH FROM A DIFFERENT SITE. .. RFA AND LEFT WRIST    Culture result: (A) 12/31/2019 03:24 PM     PRELIMINARY REPORT OF GRAM NEGATIVE RODS GROWING IN 1 OF 4 BOTTLES DRAWN CALLED TO AND READ BACK BY Link Tony RN AT 4931 ON 1.1.20 RB    Culture result: (A) 12/31/2019 03:24 PM     PRELIMINARY REPORT OF GRAM NEGATIVE RODS GROWING IN 2ND OF 4 BOTTLES DRAWN (DIFFERENT SITE=L WRIST) CALLED TO AND READ BACK BY Link Tony RN AT 15:51 ON 1/1/20 BY [facility]. Culture result: REMAINING BOTTLE(S) HAS/HAVE NO GROWTH IN 5 DAYS 12/31/2019 03:24 PM     Recent Results (from the past 24 hour(s))   METABOLIC PANEL, COMPREHENSIVE    Collection Time: 01/10/20  3:41 AM   Result Value Ref Range    Sodium 136 136 - 145 mmol/L    Potassium 3.9 3.5 - 5.1 mmol/L    Chloride 108 97 - 108 mmol/L    CO2 22 21 - 32 mmol/L    Anion gap 6 5 - 15 mmol/L    Glucose 99 65 - 100 mg/dL    BUN 19 6 - 20 MG/DL    Creatinine 0.88 0.70 - 1.30 MG/DL    BUN/Creatinine ratio 22 (H) 12 - 20      GFR est AA >60 >60 ml/min/1.73m2    GFR est non-AA >60 >60 ml/min/1.73m2    Calcium 7.8 (L) 8.5 - 10.1 MG/DL    Bilirubin, total 0.3 0.2 - 1.0 MG/DL    ALT (SGPT) 18 12 - 78 U/L    AST (SGOT) 18 15 - 37 U/L    Alk.  phosphatase 96 45 - 117 U/L    Protein, total 5.7 (L) 6.4 - 8.2 g/dL    Albumin 2.1 (L) 3.5 - 5.0 g/dL    Globulin 3.6 2.0 - 4.0 g/dL    A-G Ratio 0.6 (L) 1.1 - 2.2     MAGNESIUM    Collection Time: 01/10/20  3:41 AM   Result Value Ref Range    Magnesium 1.6 1.6 - 2.4 mg/dL   CBC W/O DIFF    Collection Time: 01/10/20  3:41 AM   Result Value Ref Range    WBC 4.9 4.1 - 11.1 K/uL    RBC 2.80 (L) 4.10 - 5.70 M/uL    HGB 8.2 (L) 12.1 - 17.0 g/dL    HCT 27.2 (L) 36.6 - 50.3 %    MCV 97.1 80.0 - 99.0 FL    MCH 29.3 26.0 - 34.0 PG    MCHC 30.1 30.0 - 36.5 g/dL    RDW 20.0 (H) 11.5 - 14.5 %    PLATELET 984 (L) 983 - 400 K/uL    MPV 10.0 8.9 - 12.9 FL    NRBC 0.0 0  WBC    ABSOLUTE NRBC 0.00 0.00 - 0.01 K/uL   PROTHROMBIN TIME + INR    Collection Time: 01/10/20  3:41 AM   Result Value Ref Range    INR 1.8 (H) 0.9 - 1.1      Prothrombin time 18.1 (H) 9.0 - 11.1 sec   PTT    Collection Time: 01/10/20  3:41 AM   Result Value Ref Range    aPTT 35.3 (H) 22.1 - 32.0 sec    aPTT, therapeutic range     58.0 - 77.0 SECS   DIGOXIN    Collection Time: 01/10/20  3:41 AM   Result Value Ref Range    Digoxin level 0.5 (L) 0.90 - 2.00 NG/ML   LACTIC ACID    Collection Time: 01/10/20  3:41 AM   Result Value Ref Range    Lactic acid 1.0 0.4 - 2.0 MMOL/L   PROCALCITONIN    Collection Time: 01/10/20  3:41 AM   Result Value Ref Range    Procalcitonin 0.15 ng/mL   LD    Collection Time: 01/10/20  3:41 AM   Result Value Ref Range     85 - 241 U/L   NT-PRO BNP    Collection Time: 01/10/20  3:41 AM   Result Value Ref Range    NT pro-BNP 4,904 (H) <125 PG/ML                 Swetha Morrow MD

## 2020-01-10 NOTE — PROGRESS NOTES
Problem: Self Care Deficits Care Plan (Adult)  Goal: *Acute Goals and Plan of Care (Insert Text)  Description    FUNCTIONAL STATUS PRIOR TO ADMISSION: Patient was modified independent using a single point cane for functional mobility. Patient able to shower and dress himself. However, patient required assistance for household management from his wife. HOME SUPPORT: The patient lived alone with wife to provide assistance. Occupational Therapy Goals all updated 1/10/2020  1. Patient will perform upper body dressing including management of LVAD with min A within 7 day(s)    2. Patient will perform lower body dressing with RW CGA within 7 day(s). 3.  Patient will perform grooming standing with RW 2 mins with supervision/setup within 7 day(s). 4.  Patient will perform toilet transfers with RW minimum assistance within 7 day(s). 5.  Patient will perform all aspects of toileting with RW with moderate assistance within 7 day(s). 6.  Patient will perform gathering ADL items high and waist height 2/2 with RW supervision within 7 days. Continue all goals 10/30/19 post re-eval for Impella removal   Initiated 10/28/2019  1. Patient will perform bathing with supervision/set-up within 7 day(s). 2.  Patient will perform lower body dressing with supervision/set-up within 7 day(s). 3.  Patient will perform grooming with modified independence within 7 day(s). 4.  Patient will perform toilet transfers with modified independence within 7 day(s). 5.  Patient will perform all aspects of toileting with supervision/set-up within 7 day(s). 6.  Patient will participate in upper extremity therapeutic exercise/activities with supervision/set-up for 5 minutes within 7 day(s). 7.  Patient will utilize energy conservation techniques during functional activities with verbal cues within 7 day(s).                     Outcome: Progressing Towards Goal  OCCUPATIONAL THERAPY TREATMENT/WEEKLY RE-ASSESSMENT  Patient: Princess Kim (75 y.o. male)  Date: 1/10/2020  Diagnosis: Acute decompensated heart failure (HCC) [I50.9]   <principal problem not specified>  Procedure(s) (LRB):  LEFT BRACHIAL THROMBECTOMY (Left) 46 Days Post-Op  Precautions: Fall  Chart, occupational therapy assessment, plan of care, and goals were reviewed. ASSESSMENT  Patient continues with skilled OT services and is progressing towards goals. Patient today requiring sitting to perform all tasks. ADLs are still limited by cognition (complex processing, sequencing of complex new tasks), strength, ROM, functional reach, standing tolerance, standing balance, coordination, syncopal episodes, cues for pacing (went from walking to nurse station and back to next day around nurse station, now absolutely fatigued as noticed by R KASHMIRE tremors) and cues for \"Move in the tube\" management. Current Level of Function Impacting Discharge (ADLs): moderate assistance upper body ADLs, moderate A lower body ADLs due to standing balance, standing max 5 mins moderate A for balance, VAD switchovers moderate A, functional mobility to and from bathroom min A, OOB 2-8 hours per day, total A instrumental ADLs    Other factors to consider for discharge: lives with wife but she is unable to provide physical A          PLAN :  Goals have been updated based on progression since last assessment. Patient continues to benefit from skilled intervention to address the above impairments. Continue to follow patient 5 times a week to address goals. Recommend with staff: Kristine Orestes for all meals, functional mobility to and from bathroom    Recommend next OT session: standing balance during lower body ADLs, gathering ADLs    Recommendation for discharge: (in order for the patient to meet his/her long term goals)  Therapy 3 hours per day 5-7 days per week as patient was modified independence prior, is highly motivated, can tolerate 3 hours of therapy.      This discharge recommendation:  Has been made in collaboration with the attending provider and/or case management    IF patient discharges home will need the following DME: gait belt, transfer bench, walker, transport w/c       SUBJECTIVE:   Patient stated I over did it yesterday. I knew when I sat on the side of the bed this morning that I was weaker. My whole day has now been like that.      OBJECTIVE DATA SUMMARY:   Cognitive/Behavioral Status:  Neurologic State: Alert  Orientation Level: Oriented X4  Cognition: Appropriate decision making; Appropriate for age attention/concentration; Appropriate safety awareness  Perception: Appears intact  Perseveration: No perseveration noted  Safety/Judgement: Awareness of environment;Good awareness of safety precautions    Functional Mobility and Transfers for ADLs:  Bed Mobility:  Rolling: Stand-by assistance  Sit to Supine: Supervision increased time and adjustment, ensuring \"move in tube\"    Transfers:  Sit to Stand: Moderate assistance failed to stand, cues to scoot to edge of chair, 2nd attempt failed, 3rd attempt moderate A   Stand to sit: moderate A EOB 2* falling onto the bed       Balance:  Sitting: Intact  Standing: Impaired  Standing - Static: Fair  Standing - Dynamic : Poor    ADL Intervention:                       Noted slight tremor R UE resting when arrived, increased to full tremor by end of session. Noted fatigue in patient face. Made the decision to return patient to bed for one hour to rest before dinner. Wife present. Nurse informed. Lower Body Dressing Assistance  Pants With Elastic Waist: Moderate assistance, LOB standing for mock over bottom, one hand on stable surface  Socks: Supervision  Shoes with Cloth Laces: Minimum assistance  Leg Crossed Method Used: Yes  Position Performed: Other (comment)(long sitting in chair, rest break in between doffing each sock and shoe, same with donning and then break to then tie shoe.  Assistance with lace setup 2* pulled tight at top with length gathered in middle, not leaving enough to tie, fatigue and pulling on chest limited demonstration/required physical A)     Patient instructed and indicated understanding the benefits of maintaining activity tolerance, functional mobility, and independence with self care tasks during acute stay  to ensure safe return home and to baseline. Encouraged patient to increase frequency and duration OOB, be out of bed for all meals, perform daily ADLs (as approved by RN/MD regarding bathing etc), and performing functional mobility to/from bathroom. Instruction specifically on pacing. Patient had to use bedpan this morning instead of walking to bathroom for BM 2* fatigue. Cognitive Retraining  Safety/Judgement: Awareness of environment;Good awareness of safety precautions    Therapeutic Exercises:   Patient is performing daily sternal and FM. Pain:      Activity Tolerance:   requires frequent rest breaks and observed SOB with activity  Please refer to the flowsheet for vital signs taken during this treatment.     After treatment patient left in no apparent distress:   Supine in bed, Call bell within reach, Caregiver / family present, and Side rails x 3    COMMUNICATION/COLLABORATION:   The patients plan of care was discussed with: Registered Nurse    Arpan Gar  Time Calculation: 37 mins

## 2020-01-10 NOTE — PROGRESS NOTES
[facility] in [place], [place]  Inpatient Progress Note      Patient name: Reid Baumann  Patient : 1950  Patient MRN: [id number]  Attending MD: Juan Pappas MD  Date of service: 01/10/20    Chief Complaint:   [place] azucena LVAD implant     HPI: Sona Kiser is a 71y.o. year old pleasant white male with a history of HTN, HLD, JOSE, CAD s/p cardiac arrest VFib s/p CABG () c/b sternal would infection and sternectomy, ischemic cardiomyopathy LVEF 15-20%, s/p ICD and with LBBB. He was admitted with acute on chronic systolic heart failure with massive volume overload > 20 lbs, in the setting of atrial fibrillation s/p failed DCCV and underwent DCCV and RHC on .  S/p BiVICD on 19 with Dr. Domi De Leon. He was discharged home home on IV milrinone on 19. Erica Briggs has been followed closely by Dr. Emilee Valencia and the [facility].     Mr. Kiser was admitted for acute on chronic systolic heart failure. He underwent implantation of Impella 5.0 due to CS and has completed his LVAD evaluation. Erica Briggs meets criteria for implant of HM3 as DT. He was NYHA Class IV prior to implant of Impella 5.0, has LVEF < 15%, was intolerant of GDMT due to symptomatic hypotension and renal dysfunction. He remains dependent on temporary MCS support. RHC  revealed compensated hemodynamics on Impella. His renal function has improved dramatically with improvement in his hemodynamics. He is not a suitable transplant candidate due to single kidney, sternectomy, debility, and frailty. He was reviewed by Zeinab Rangel and felt to be a high risk heart transplant candidate due to multiple co-morbidities as well as the afore mentioned conditions.  He remained in the CCU and underwent a HM 3 implant as DT on .   He was weaned off of pressors and transferred to [place] [address] where he continues to undergo PT/OT and hemodynamic optimization.       24Hr Events:   No syncopal events in > 24 hrs   MAPs stable  INR 1.8  Fatigued from therapy     Procedure:  Procedure(s):  REMOVAL TEMPORARY LEFT VENTRICULAR ASSIST DEVICE, IMPLANTATION OF LEFT VENTRICULAR ASSIST DEVICE PERMANENT (VAD), ECC, JACQUE, EPI AORTIC US BY DR Albertina Haas.     POD:  52     Impression / Plan:   Heart Failure Status: NYHA Class IV, improved to NYHA Class III    Chronic systolic heart failure due to ICM, NYHA Class IV, EF < 15%, cardiogenic shock bridged with Impella 5.0 support to HM 3 implant   S/p Impella removal and LVAD implant 11/18/19  RPMs stable at 6600   Multiple PI events on interrogation - but fewer than yesterday   Continue Sildenafil 20 mg PO TID  Intolerant of BB due to RV failure  Intolerant of ACEi/ARB/ARNI/AA due to JUAN J on CKD 3  Intolerant of spironolactone d/t IVVD   Continue midodrine 5 mg po TID for orthostatic hypotension  Strict I/O, daily weights  Continue LVAD education   Dressing change frequency three times weekly, sutures removed 12/26/19    Bacteremia - Pseudomonas  Blood cultures (12/31/19) 2/4 bottles +Pseudomonas & 2/4 bottles GNRs    Repeat BC NGTD  On IV Cefepime and Levaquin   Follow procalcitonin and lactic acid levels - both improving    Orthostatic Hypotension  Midodrine 5 mg PO TID  Continue Florinef 0.1 mg daily   Stim test unremarkable    Generalized myoclonus   Improved   Appreciate Neurology input  Discontinued Keppra due to dizziness   Head CT negative for acute process  EEG suggestive of mild generalized encephalopathic process, possibly related to underlying structural brain injury vs metabolic abnormality  Monitor closely      Anticoagulation for LVAD, INR goal 1.5-2  Goal decreased d/t persistent hematuria   No ASA d/t hematuria  INR 1.8  Coumadin - 4 mg tonight    Epistaxis  Resolved      Acute Respiratory Failure post op  Improved with aggressive diuresis  Off supplemental O2  Pulmonary hygiene   Cont home CPAP- must use while sleeping - will have hospital RT adjust mask/settings to improve patient compliance Schedule PET CT prior to discharge - ordered for Monday 1/13    Acute on CKD 3, atrophic left kidney  Appreciate Nephrology assistance  Watch Cr, stable  Hold diuretics   Avoid nephrotoxins, trend labs      CAD s/p CABG   Off ASA d/t hematuria  No BB d/t RV failure  Hold statin due to recent hepatic failure   LHC performed 11/13 - low likelihood of benefit from revascularization      Hx of VF arrest s/p BiV-ICD  No recent shocks  Keep K > 4 and Mg > 2   Mag ox 800 mg po BID  ICD reprogrammed to reduce diaphragmatic stimulation     PAF   Dig level 0.5-cont dig (62.5 mcg qMWF)  No BB due to RV failure   Repeat TFTs WNL  Interrogate ICD to eval AF burden      Hx of gout  Uric acid WNL    Acute blood loss anemia  Likely due to hematuria  Cont octreotide 25 mcg SQ TID   Fecal occult 12/14 negative, positive on 12/17  Appreciate urology recommendations  Hematuria improved  Monitor for now     Urinary retention, c/b serratia UTI and hematuria  Appreciate Urology consult   Repeat UA with cx negative 12/15   Cystoscopy performed 11/13 shows catheter induced cystitis   Pseudomonas aeruginosa positive UA culture (12/31/19) - on Cefepmine and levaquin     Malnutrition   Eating better  Prealbumin weekly - 13.3 on 1/5  Appreciate Nutritionist assistance  Diet as tolerated, encourage food from home, protein shakes  Intolerant of mirtazapine d/t tremors, confusion   Cont MVI add thiamine 100mg daily   Hold on DHT placement for now     COPD   Appreciate Pulmonology assistance   Continue nebs PRN       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 Bridget Dailey, will continue IV abx and wet to dry dressings.   Will likely require sternal wire after ~3 months from op date     Vocal cord paralysis  Improved  ENT recs appreciated  Speech therapy following - appreciate input    Anxiety  Change Klonopin to 0.5 mg PO qHS    Depression  Increase Effexor to 150 mg PO qAM   Monitor rhythm strips for prolonged QTc     Debility  Continue PT/OT     Bladder spasms  Received pyridium 100mg x4 doses  Oxybutynin 5mg Q6hr prn      Ppx  Protonix   PT/OT   +daily BM   PICC placed 11/22/19      Dispo:  Remain in CVSU at this time  Will need IP rehab. Not ready for discharge at this time          LVAD INTERROGATION:  Device interrogated in person  Device function normal, normal flow, multiple PI events    LVAD   Pump Speed (RPM): 6600  Pump Flow (LPM): 5.5  MAP: 99  PI (Pulsitility Index): 3.5  Power: 5.9   Test: No  Back Up  at Bedside & Labeled: Yes  Power Module Test: No  Driveline Site Care: No  Driveline Dressing: Clean, Dry, and Intact  Outpatient: No  MAP in Therapeutic Range (Outpatient): No  Testing  Alarms Reviewed: No  Back up SC speed: 6600  Back up Low Speed Limit: 6000  Emergency Equipment with Patient?: Yes  Emergency procedures reviewed?: Yes  Drive line site inspected?: No  Drive line intergrity inspected?: Yes  Drive line dressing changed?: No    PHYSICAL EXAM:  Visit Vitals  BP 91/72 (BP 1 Location: Left arm, BP Patient Position: At rest)   Pulse 92   Temp 98.1 °F (36.7 °C)   Resp 18   Ht 6' 2\" (1.88 m)   Wt 170 lb 6.7 oz (77.3 kg)   SpO2 99%   BMI 21.88 kg/m²       Physical Exam   Constitutional: He is oriented to person, place, and time. He appears well-developed. He appears cachectic. No distress. HENT:   Head: Normocephalic. Neck: Normal range of motion. Neck supple. No JVD present. Cardiovascular: Normal rate, regular rhythm and normal heart sounds. + VAD hum    Pulmonary/Chest: Effort normal and breath sounds normal. No tachypnea. No respiratory distress. Abdominal: Soft. Bowel sounds are normal. He exhibits no distension.    Musculoskeletal: Normal range of motion. General: No edema. Neurological: He is alert and oriented to person, place, and time. Skin: Skin is warm and dry. Psychiatric: He has a normal mood and affect. His speech is normal and behavior is normal.   ~1 cm x 1 cm x 0.25 cm area of delayed closure on sternotomy. Site clean, no erythema or drainage noted. Subcutaneous tissue noted. Nursing note and vitals reviewed. REVIEW OF SYSTEMS:  Review of Systems   Constitutional: Positive for malaise/fatigue. Negative for chills and fever. HENT: Positive for hearing loss. Negative for nosebleeds. Eyes: Negative. Respiratory: Negative for cough and shortness of breath. Mild dyspnea   Cardiovascular: Negative for chest pain, palpitations, orthopnea and leg swelling. Orthostatic   Gastrointestinal: Negative for heartburn, nausea and vomiting. Genitourinary: Negative for dysuria and hematuria. Musculoskeletal: Negative. Neurological: Positive for dizziness and weakness. Negative for headaches. Mild dizziness - improving    Endo/Heme/Allergies: Does not bruise/bleed easily.        PAST MEDICAL HISTORY:  Past Medical History:   Diagnosis Date    Degenerative disc disease, lumbar     Heart failure ([address]     High cholesterol     Hypertension     Paroxysmal atrial fibrillation ([address] 4/2/2019    Spinal stenosis        PAST SURGICAL HISTORY:  Past Surgical History:   Procedure Laterality Date    COLONOSCOPY Left 11/11/2019    COLONOSCOPY at bedside performed by Jamia Vieira MD at [address] HX APPENDECTOMY      HX CORONARY ARTERY BYPASS GRAFT      triple    HX HERNIA REPAIR      HX IMPLANTABLE CARDIOVERTER DEFIBRILLATOR      [place] CARDIOVERSION ELECTIVE ARRHYTHMIA EXTERNAL N/A 6/10/2019    EP CARDIOVERSION performed by Aura Stone MD at [address] CATH LAB    [place] CARDIOVERSION ELECTIVE ARRHYTHMIA EXTERNAL N/A 6/18/2019    EP CARDIOVERSION performed [facility] Kristine [facility]ers MD at [facility] CARDIAC CATH LAB    SC INSJ/RPLCMT PERM DFB W/TRNSVNS LDS 1/DUAL CHMBR N/A 2019    INSERT ICD BIV MULTI performed by Teofilo Pugh MD at [address], [facility] CATH LAB    SC TCAT IMPL WRLS P-ART PRS SNR L-T HEMODYN MNTR N/A 2019    IMPLANT HEART FAILURE MONITORING DEVICE performed by Jennifer Grace MD at [address] CATH LAB       FAMILY HISTORY:  Family History   Problem Relation Age of Onset    Lung Disease Mother     Hypertension Mother     Arthritis-osteo Mother     Heart Disease Mother     Heart Disease Father     Diabetes Father        SOCIAL HISTORY:  Social History     Socioeconomic History    Marital status:      Spouse name: Not on file    Number of children: Not on file    Years of education: Not on file    Highest education level: Not on file   Tobacco Use    Smoking status: Former Smoker     Last attempt to quit: 2010     Years since quittin.1    Smokeless tobacco: Never Used   Substance and Sexual Activity    Alcohol use: Not Currently     Comment: rarely    Drug use: Never   Other Topics Concern       LABORATORY RESULTS:     Labs Latest Ref Rng & Units 1/10/2020 2020 2020 2020 2020 2020 2020   WBC 4.1 - 11.1 K/uL 4.9 5.7 5.3 6.4 6.8 6.2 6.1   RBC 4.10 - 5.70 M/uL 2.80(L) 3.09(L) 3.15(L) 2.99(L) 2.98(L) 3.10(L) 3.05(L)   Hemoglobin 12.1 - 17.0 g/dL 8. 2(L) 9.1(L) 9.2(L) 8.7(L) 8.5(L) 8. 9(L) 8.8(L)   Hematocrit 36.6 - 50.3 % 27. 2(L) 29. 3(L) 29. 9(L) 28. 4(L) 28. 3(L) 29. 3(L) 28. 5(L)   MCV 80.0 - 99.0 FL 97.1 94.8 94.9 95.0 95.0 94.5 93.4   Platelets 080 - 801 K/uL 123(L) 138(L) 139(L) 138(L) 123(L) 127(L) 131(L)   Lymphocytes 12 - 49 % - - - - - - -   Monocytes 5 - 13 % - - - - - - -   Eosinophils 0 - 7 % - - - - - - -   Basophils 0 - 1 % - - - - - - -   Albumin 3.5 - 5.0 g/dL 2. 1(L) 2. 4(L) 2. 4(L) 2. 3(L) 2. 5(L) 2. 4(L) 2. 4(L)   Calcium 8.5 - 10.1 MG/DL 7. 8(L) 9.1 8.9 8.7 8.9 9.0 8.3(L)   SGOT 15 - 37 U/L 18 21 18 18 18 19 21   Glucose 65 - 100 mg/dL 99 103(H) 108(H) 114(H) 104(H) 102(H) 105(H)   BUN 6 - 20 MG/DL 19 21(H) 23(H) 26(H) 23(H) 21(H) 20   Creatinine 0.70 - 1.30 MG/DL 0.88 1.11 1.08 1.04 1.11 1.09 1.03   Sodium 136 - 145 mmol/L 136 133(L) 133(L) 130(L) 131(L) 130(L) 132(L)   Potassium 3.5 - 5.1 mmol/L 3.9 4.3 4.6 4.8 4.9 4.6 4.8   TSH 0.36 - 3.74 uIU/mL - - - - - - -   LDH 85 - 241 U/L 186 203 198 202 186 185 319(H)   Some recent data might be hidden     Lab Results   Component Value Date/Time    TSH 2.30 12/03/2019 03:29 AM    TSH 2.40 10/25/2019 07:39 PM    TSH 2.45 06/01/2019 04:16 AM       ALLERGY:  No Known Allergies     CURRENT MEDICATIONS:  Current Facility-Administered Medications   Medication Dose Route Frequency    cholecalciferol (VITAMIN D3) (1000 Units /25 mcg) tablet 2 Tab  2,000 Units Oral DAILY    clonazePAM (KlonoPIN) tablet 0.5 mg  0.5 mg Oral QHS    warfarin (COUMADIN) tablet 4 mg  4 mg Oral ONCE    fludrocortisone (FLORINEF) tablet 0.1 mg  0.1 mg Oral DAILY    midodrine (PROAMITINE) tablet 5 mg  5 mg Oral TID WITH MEALS    hydrocortisone (CORTAID) 1 % cream   Topical TID PRN    sildenafil (pulm.hypertension) (REVATIO) tablet 20 mg  20 mg Oral TID    thiamine HCL (B-1) tablet 100 mg  100 mg Oral DAILY    venlafaxine-SR (EFFEXOR-XR) capsule 75 mg  75 mg Oral DAILY WITH BREAKFAST    levoFLOXacin (LEVAQUIN) 750 mg in D5W IVPB  750 mg IntraVENous Q24H    cefepime (MAXIPIME) 2 g in 0.9% sodium chloride (MBP/ADV) 100 mL  2 g IntraVENous Q8H    oxybutynin (DITROPAN) tablet 5 mg  5 mg Oral Q6H PRN    magnesium oxide (MAG-OX) tablet 800 mg  800 mg Oral BID    lidocaine (URO-JET/ GLYDO) 2 % jelly   Urethral PRN    epoetin yvonne-epbx (RETACRIT) injection 20,000 Units  20,000 Units SubCUTAneous Q MON, WED & FRI    albuterol-ipratropium (DUO-NEB) 2.5 MG-0.5 MG/3 ML  3 mL Nebulization Q6H PRN    therapeutic multivitamin (THERAGRAN) tablet 1 Tab  1 Tab Oral DAILY    alteplase (CATHFLO) 1 mg in sterile water (preservative free) 1 mL injection  1 mg InterCATHeter PRN    finasteride (PROSCAR) tablet 5 mg  5 mg Oral DAILY    diphenhydrAMINE (BENADRYL) capsule 25 mg  25 mg Oral QHS PRN    octreotide (SANDOSTATIN) injection 25 mcg  25 mcg SubCUTAneous TID    digoxin (LANOXIN) tablet 0.0625 mg  62.5 mcg Oral Q MON, WED & FRI    pantoprazole (PROTONIX) tablet 40 mg  40 mg Oral ACB    allopurinol (ZYLOPRIM) tablet 100 mg  100 mg Oral DAILY    arformoterol (BROVANA) neb solution 15 mcg  15 mcg Nebulization BID RT    And    budesonide (PULMICORT) 500 mcg/2 ml nebulizer suspension  500 mcg Nebulization BID RT    lactobac ac& pc-s.therm-b.anim (TIAN Q/RISAQUAD)  1 Cap Oral DAILY    oxyCODONE IR (ROXICODONE) tablet 5 mg  5 mg Oral Q6H PRN    tamsulosin (FLOMAX) capsule 0.4 mg  0.4 mg Oral DAILY    Warfarin MD/NP dosing   Other PRN    sodium chloride (NS) flush 5-40 mL  5-40 mL IntraVENous Q8H    sodium chloride (NS) flush 5-40 mL  5-40 mL IntraVENous PRN    acetaminophen (TYLENOL) tablet 650 mg  650 mg Oral Q6H PRN    naloxone (NARCAN) injection 0.4 mg  0.4 mg IntraVENous PRN    ondansetron (ZOFRAN) injection 4 mg  4 mg IntraVENous Q4H PRN    sucralfate (CARAFATE) tablet 1 g  1 g Oral AC&HS    influenza vaccine 2019-20 (6 mos+)(PF) (FLUARIX/FLULAVAL/FLUZONE QUAD) injection 0.5 mL  0.5 mL IntraMUSCular PRIOR TO DISCHARGE    hydrALAZINE (APRESOLINE) 20 mg/mL injection 20 mg  20 mg IntraVENous Q6H PRN         Thank you for allowing me to participate in this patient's care. Kaylen Vergara NP  [address] [place] [address], [address]  Phone: [phone]  Fax: [phone]    [facility] ATTENDING ADDENDUM    Patient was seen and examined in person. Data and notes were reviewed. I have discussed and agree with the plan as noted in the NP note above without further additions.     Maribel Phillips MD PhD  [facility] Morales

## 2020-01-10 NOTE — PROGRESS NOTES
[facility] VAD/Heart Failure Progress Note    Admit Date: 10/25/2019  POD:  46 Days Post-Op    Procedure:  Procedure(s):  LEFT BRACHIAL THROMBECTOMY        Subjective:   Mild dyspnea, fatigue, and weakness; WTD for chest; less lethargy      Objective:   Vitals:  Blood pressure 91/72, pulse 90, temperature 98 °F (36.7 °C), resp. rate 18, height 6' 2\" (1.88 m), weight 170 lb 6.7 oz (77.3 kg), SpO2 99 %.   Temp (24hrs), Av.8 °F (36.6 °C), Min:97.3 °F (36.3 °C), Max:98.2 °F (36.8 °C)    Hemodynamics:   CO: CO (l/min): 5.8 l/min   CI: CI (l/min/m2): 2.8 l/min/m2   CVP: CVP (mmHg): 4 mmHg (19 1645)   SVR: SVR (dyne*sec)/cm5: 1080 (dyne*sec)/cm5 (19 3673)   PAP Systolic: PAP Systolic: 34 ( 8103)   PAP Diastolic: PAP Diastolic: 13 ( 0123)   PVR:     SV02: SVO2 (%): 67 % (19 1300)   SCV02: SCVO2 (%): 75 % (10/29/19 1900)    VAD Interrogation: LVAD (Heartmate)  Pump Speed (RPM): 6600  Pump Flow (LPM): 5.8  Chatter in Lines: No  PI (Pulsitility Index): 3.9  Power: 6  MAP: 99   Test: Yes  Back Up  at Bedside & Labeled: Yes  Power Module Test: Yes  Driveline Site Care: No  Driveline Dressing: Clean, Dry, and Intact    EKG/Rhythm:      Extubation Date / Time:     CT Output:     Ventilator:  Ventilator Volumes  Vt Set (ml): 550 ml (19 0826)  Vt Exhaled (Machine Breath) (ml): 639 ml (19 0826)  Vt Spont (ml): 437 ml (19 1035)  Ve Observed (l/min): 7.25 l/min (19 1035)    Oxygen Therapy:  Oxygen Therapy  O2 Sat (%): 99 % (01/10/20 0813)  Pulse via Oximetry: 90 beats per minute (01/10/20 0802)  O2 Device: Room air (01/10/20 0813)  PEEP/CPAP (cm H2O): 1 cm H20 (20 1013)  O2 Flow Rate (L/min): 1 l/min (20 0400)  FIO2 (%): 21 % (20 1047)    CXR:    Admission Weight: Last Weight   Weight: 192 lb 10.9 oz (87.4 kg) Weight: 170 lb 6.7 oz (77.3 kg)     Intake / Output / Drain:  Current Shift: 01/10 0701 - 01/10 1900  In: 240 [P.O.:240]  Out: -   Last 24 hrs.:     Intake/Output Summary (Last 24 hours) at 1/10/2020 1044  Last data filed at 1/10/2020 0813  Gross per 24 hour   Intake 1240 ml   Output 3000 ml   Net -1760 ml             No results for input(s): CPK, CKMB, TROIQ in the last 72 hours.   Recent Labs     01/10/20  0341 01/09/20  0537 01/08/20  0301    133* 133*   K 3.9 4.3 4.6   CO2 22 24 23   BUN 19 21* 23*   CREA 0.88 1.11 1.08   GLU 99 103* 108*   MG 1.6 1.8 1.8   WBC 4.9 5.7 5.3   HGB 8.2* 9.1* 9.2*   HCT 27.2* 29.3* 29.9*   * 138* 139*     Recent Labs     01/10/20  0341 01/09/20  0537 01/08/20  0301   INR 1.8* 1.7* 1.5*   PTP 18.1* 16.9* 15.1*   APTT 35.3* 34.5* 33.7*     No lab exists for component: PBNP        Current Facility-Administered Medications:     fludrocortisone (FLORINEF) tablet 0.1 mg, 0.1 mg, Oral, DAILY, Mounika Altman MD, 0.1 mg at 01/10/20 4907    clonazePAM (KlonoPIN) tablet 0.5 mg, 0.5 mg, Oral, BID, PollJeremie capone NP, 0.5 mg at 01/10/20 0840    midodrine (PROAMITINE) tablet 5 mg, 5 mg, Oral, TID WITH MEALS, Jeremie Herrera NP, 5 mg at 01/10/20 4102    hydrocortisone (CORTAID) 1 % cream, , Topical, TID PRN, Mounika Limon MD    sildenafil (pulm.hypertension) (REVATIO) tablet 20 mg, 20 mg, Oral, TID, oMunika Altman MD, 20 mg at 01/10/20 3632    thiamine HCL (B-1) tablet 100 mg, 100 mg, Oral, DAILY, Ailyn ACUNA NP, 100 mg at 01/10/20 6517    venlafaxine-SR (EFFEXOR-XR) capsule 75 mg, 75 mg, Oral, DAILY WITH BREAKFAST, Ailyn ACUNA NP, 75 mg at 01/10/20 7160    levoFLOXacin (LEVAQUIN) 750 mg in D5W IVPB, 750 mg, IntraVENous, Q24H, Shannan Olivares MD, Last Rate: 100 mL/hr at 01/09/20 1429, 750 mg at 01/09/20 1429    cefepime (MAXIPIME) 2 g in 0.9% sodium chloride (MBP/ADV) 100 mL, 2 g, IntraVENous, Q8H, Shannan Olivares MD, Last Rate: 200 mL/hr at 01/10/20 0833, 2 g at 01/10/20 0833    oxybutynin (DITROPAN) tablet 5 mg, 5 mg, Oral, Q6H PRN, Candy BHAKTA, NP, 5 mg at 01/01/20 1204    magnesium oxide (MAG-OX) tablet 800 mg, 800 mg, Oral, BID, Braden Martinez NP, 800 mg at 01/10/20 0832    albumin human 5% (BUMINATE) solution 12.5 g, 12.5 g, IntraVENous, DIALYSIS PRN, Logan Kincaid MD    lidocaine (URO-JET/ GLYDO) 2 % jelly, , Urethral, PRN, Tonya Altman MD    epoetin yvonne-epbx (RETACRIT) injection 20,000 Units, 20,000 Units, SubCUTAneous, Q MON, WED & FRI, Logan Kincaid MD, 20,000 Units at 01/08/20 2127    albuterol-ipratropium (DUO-NEB) 2.5 MG-0.5 MG/3 ML, 3 mL, Nebulization, Q6H PRN, Octavio Galvan MD, 3 mL at 12/25/19 1407    therapeutic multivitamin (THERAGRAN) tablet 1 Tab, 1 Tab, Oral, DAILY, Shana Sims, NP, 1 Tab at 01/10/20 2244    alteplase (CATHFLO) 1 mg in sterile water (preservative free) 1 mL injection, 1 mg, InterCATHeter, PRN, Mounika Sandoval MD, 1 mg at 01/08/20 0919    finasteride (PROSCAR) tablet 5 mg, 5 mg, Oral, DAILY, Sudhir Marie MD, 5 mg at 01/10/20 5634    diphenhydrAMINE (BENADRYL) capsule 25 mg, 25 mg, Oral, QHS PRN, Ciro Herrera NP, 25 mg at 01/05/20 2325    octreotide (SANDOSTATIN) injection 25 mcg, 25 mcg, SubCUTAneous, TID, Ciro Herrera NP, 25 mcg at 01/10/20 0900    benzocaine-menthol (CEPACOL) lozenge 1 Lozenge, 1 Lozenge, Mucous Membrane, PRN, Ciro Herrera NP    digoxin (LANOXIN) tablet 0.0625 mg, 62.5 mcg, Oral, Q MON, WED & FRI, Polliard, Fabricio Bride T, NP, 0.0625 mg at 01/08/20 2127    pantoprazole (PROTONIX) tablet 40 mg, 40 mg, Oral, ACB, Polliard, Fabricio Bride T, NP, 40 mg at 01/10/20 0656    allopurinol (ZYLOPRIM) tablet 100 mg, 100 mg, Oral, DAILY, Polliard, Fabricio Bride T, NP, 100 mg at 01/10/20 0832    arformoterol (BROVANA) neb solution 15 mcg, 15 mcg, Nebulization, BID RT, 15 mcg at 01/10/20 0802 **AND** budesonide (PULMICORT) 500 mcg/2 ml nebulizer suspension, 500 mcg, Nebulization, BID RT, Polliard, Fabricio Bride T, NP, 500 mcg at 01/10/20 0802    artificial saliva (MOUTH KOTE) 1 Spray, 1 Spray, Oral, PRN, Nicho Herrera, NP, 1 Spray at 12/12/19 1452    lactobac ac& pc-s.therm-b.anim (TIAN Q/RISAQUAD), 1 Cap, Oral, DAILY, Kim Hendrickson MD, 1 Cap at 01/10/20 8466    oxyCODONE IR (ROXICODONE) tablet 5 mg, 5 mg, Oral, Q6H PRN, Larissa Elizabeth MD, 5 mg at 12/30/19 0405    tamsulosin (FLOMAX) capsule 0.4 mg, 0.4 mg, Oral, DAILY, Nina Giraldo MD, 0.4 mg at 01/10/20 0832    Warfarin MD/NP dosing, , Other, PRN, Clem Altman MD    sodium chloride (NS) flush 5-40 mL, 5-40 mL, IntraVENous, Q8H, Jyothi PAULINO MD, 10 mL at 01/10/20 0656    sodium chloride (NS) flush 5-40 mL, 5-40 mL, IntraVENous, PRN, Larissa Elizabeth MD, 10 mL at 01/02/20 2342    acetaminophen (TYLENOL) tablet 650 mg, 650 mg, Oral, Q6H PRN, Larissa Elizabeth MD, 650 mg at 01/02/20 2213    naloxone (NARCAN) injection 0.4 mg, 0.4 mg, IntraVENous, PRN, Larissa Elizabeth MD    ondansetron [facility]) injection 4 mg, 4 mg, IntraVENous, Q4H PRN, Larissa Elizabeth MD, 4 mg at 01/03/20 1748    bisacodyl (DULCOLAX) tablet 5 mg, 5 mg, Oral, DAILY PRN, Larissa Elizabeth MD, 5 mg at 12/22/19 1533    sucralfate (CARAFATE) tablet 1 g, 1 g, Oral, AC&HS, Larissa Elizabeth MD, 1 g at 01/10/20 0656    influenza vaccine 2019-20 (6 mos+)(PF) (FLUARIX/FLULAVAL/FLUZONE QUAD) injection 0.5 mL, 0.5 mL, IntraMUSCular, PRIOR TO DISCHARGE, Clem Altman MD    hydrALAZINE (APRESOLINE) 20 mg/mL injection 20 mg, 20 mg, IntraVENous, Q6H PRN, Shana Sims NP, 20 mg at 12/10/19 0541    dextrose 10 % infusion 125-250 mL, 125-250 mL, IntraVENous, PRN, Mounika Elkins MD, Stopped at 11/18/19 0700    A/P     S/P LVAD - good flows  Need for anti-coagulation - coumadin  DM - insulin  RV dysfunction - milrinone, diuretics      Risk of morbidity and mortality - high  Medical decision making - high complexity    Signed By: Dortha Curling, MD

## 2020-01-10 NOTE — PROGRESS NOTES
Problem: Mobility Impaired (Adult and Pediatric)  Goal: *Acute Goals and Plan of Care (Insert Text)  Description  FUNCTIONAL STATUS PRIOR TO ADMISSION: Patient was modified independent using a single point cane for functional mobility. Patient reports an increasingly sedentary lifestyle 2* fatigue and SOB/dyspnea. Retired (so is his wife). Patient reports x 3 falls within the last couple of weeks. Patient is wearing either nasal cannula or CPAP at night. LVAD work-up has been initiated. HOME SUPPORT PRIOR TO ADMISSION: The patient lived with his wife, but did not require physical assistance. Physical Therapy Goals:    Goals upgraded as appropriate 1/07/2020- Goals appropriate 1/9/2020 on weekly reassessment  1. Patient will move from supine to sit and sit to supine, scoot up and down and roll side to side in bed with supervision within 7 days. 2.  Patient will perform sit to/from stand with minimal assistance x 1 within 7 days. 3.  Patient will ambulate 50 feet with least restrictive assistive device and moderate assistance within 7 days. 4.  Stair goal to be formulated when appropriate. 5.  Patient will perform cardiac exercises per protocol with supervision within 7 days. 6.  Patient will verbally and functionally recall mindful movement principles (Move in the Tube) with minimal verbal cuing/reminding within 7 days. 7.  Patient will perform power exchange for power module to/from battery with minimal assistance within 7 days. Reassessed on 1/2/2020 and goals not met, carry-over. Reassessed on 12/26/2019, goals not met but remain appropriate, so carry over  Weekly reassessment completed 12/19/2019 and goals downgraded as appropriate. 1.  Patient will move from supine to sit and sit to supine, scoot up and down and roll side to side in bed with contact guard assistance within 7 days. 2.  Patient will perform sit to/from stand with minimal assistance x 1 within 7 days.   3.  Patient will ambulate 25 feet with least restrictive assistive device and moderate assistance within 7 days. 4.  Stair goal to be formulated when appropriate. 5.  Patient will perform cardiac exercises per protocol with supervision within 7 days. 6.  Patient will verbally and functionally recall mindful movement principles (Move in the Tube) with minimal verbal cuing/reminding within 7 days. 7.  Patient will perform power exchange for power module to/from battery with moderate assistance within 7 days. Re-evaluation completed 11/20/2019 and new goals formulated following LVAD implantation. Reviewed and cont 12/3/2019. Reviewed and continued 12/12/2019  1. Patient will move from supine to sit and sit to supine, scoot up and down and roll side to side in bed with minimal assistance/contact guard assist within 7 days. 2.  Patient will perform sit to/from stand with minimal assistance/contact guard assist within 7 days. 3.  Patient will ambulate 150 feet with least restrictive assistive device and minimal assistance/contact guard assist within 7 days. 4.  Patient will ascend/descend 4 stairs with handrail(s) with minimal assistance/contact guard assist within 7 days. 5.  Patient will perform cardiac exercises per protocol with supervision within 7 days. 6.  Patient will verbally and functionally recall 3/3 sternal precautions within 7 days. 7.  Patient will perform power exchange for power module to/from battery with moderate assistance  within 7 days. Initiated 10/27/2019; updated 11/15/2019   1. Patient will move from supine to sit and sit to supine, scoot up and down and roll side to side in bed with independence within 7 days. 2.  Patient will perform sit to/from stand with supervision/set-up within 7 days. 3.  Patient will ambulate 200 feet with least restrictive assistive device and supervision/set-up within 7 days.    4.  Patient will ascend/descend 4 stairs with  handrail(s) with supervision/set-up within 7 days for functional strengthening and community reintegration. .  5.  Patient will verbally and functionally recall 3/3 sternal precautions within 7 days in preparation for LVAD implantation. 6.  Patient will perform a mock power exchange for power module to/from battery with supervision/set-up within 7 days in preparation for LVAD implantation. 1.  Patient will move from supine to sit and sit to supine, scoot up and down and roll side to side in bed with independence within 7 days. 2.  Patient will perform sit to/from stand with supervision/set-up within 7 days. 3.  Patient will ambulate 150 feet with least restrictive assistive device and supervision/set-up within 7 days. 4.  Patient will ascend/descend 4 stairs with  handrail(s) with supervision/set-up within 7 days for functional strengthening and community reintegration. .  5.  Patient will verbally and functionally recall 3/3 sternal precautions within 7 days in preparation for LVAD implantation. 6.  Patient will perform a mock power exchange for power module to/from battery with supervision/set-up within 7 days in preparation for LVAD implantation. Outcome: Progressing Towards Goal      PHYSICAL THERAPY TREATMENT  Patient: Marissa Villalba (45 y.o. male)  Date: 1/10/2020  Diagnosis: Acute decompensated heart failure ([facility] [address] [I50.9]   <principal problem not specified>  Procedure(s) (LRB):  LEFT BRACHIAL THROMBECTOMY (Left) 46 Days Post-Op  Precautions: Fall  Chart, physical therapy assessment, plan of care and goals were reviewed. ASSESSMENT  Patient continues with skilled PT services and is progressing towards goals. Pt agreeable to therapy. Pt reports dizziness and feeling weak with stand. MAP 67 in seated. Post stand  MAP 64. Pt had decrease LE stability. Pt had strong desire to ambulate assisted pt 10 feet with rolling walker with assistance of 2 and chair follow.      Current Level of Function Impacting Discharge (mobility/balance): Ax2    Other factors to consider for discharge: +dizziness, ataxia, decrease activity tolerance          PLAN :  Patient continues to benefit from skilled intervention to address the above impairments. Continue treatment per established plan of care. to address goals. Recommendation for discharge: (in order for the patient to meet his/her long term goals)  Therapy 3 hours per day 5-7 days per week    This discharge recommendation:  Has been made in collaboration with the attending provider and/or case management    IF patient discharges home will need the following DME: to be determined (TBD)       SUBJECTIVE:   Patient stated I dont know why I am weak.     OBJECTIVE DATA SUMMARY:   Cardiac diagnosis intervention:      Critical Behavior:  Neurologic State: Alert  Orientation Level: Oriented X4  Cognition: Appropriate for age attention/concentration  Safety/Judgement: Decreased insight into deficits  Functional Mobility Training:  Bed Mobility:  Rolling: Stand-by assistance  Supine to Sit: Stand-by assistance              Transfers:  Sit to Stand: Minimum assistance;Assist x2  Stand to Sit: Minimum assistance                             Balance:  Sitting: Intact  Standing: Impaired  Standing - Static: Fair  Standing - Dynamic : Poor  Ambulation/Gait Training:  Distance (ft): 10 Feet (ft)  Assistive Device: Gait belt;Walker, rolling  Ambulation - Level of Assistance: Moderate assistance;Assist x2        Gait Abnormalities: Ataxic;Decreased step clearance(decrease LE stability)        Base of Support: Center of gravity altered                            Stairs:              VAD power transition  Patient performed switchover from power module to battery.   Completed the following tasks:   Independent Verbal cues Physical assist Comments   Don holster vest x      Check batteries x      Check        Ensure  clipped onto stabilization belt   x    Position batteries in clips x      Disconnect power leads   x    Insert power lead into battery x      [place] batteries in holster   x     Secure batteries with velcro and clips   x      Patient performed switchover from battery to power module. Completed the following tasks:   Independent Verbal cues Physical assistance Comments   Remove batteries from holster       Disconnect power leads from battery       Reconnect power leads into power module       Remove batteries from clips       Doff holster vest             Pain Rating:  Did not complain    Activity Tolerance:   Poor  Please refer to the flowsheet for vital signs taken during this treatment.     After treatment patient left in no apparent distress:   Sitting in chair and Call bell within reach    COMMUNICATION/COLLABORATION:   The patients plan of care was discussed with: Registered Nurse    Alex Luque PTA   Time Calculation: 38 mins

## 2020-01-10 NOTE — PROGRESS NOTES
ID Progress Note  1/10/2020    Subjective:     Doing ok, seems to be feeling stronger--walked some more today    Objective:     Antibiotics:  1. Levaquin  2. Cefepime   3. Rifampin   4. Fluconazole  5. Vancomycin    6. Cefazolin   7. Zosyn       Vitals:   Visit Vitals  BP 91/72 (BP 1 Location: Left arm, BP Patient Position: At rest)   Pulse 92   Temp 98.1 °F (36.7 °C)   Resp 18   Ht 6' 2\" (1.88 m)   Wt 77.3 kg (170 lb 6.7 oz)   SpO2 99%   BMI 21.88 kg/m²        Tmax:  Temp (24hrs), Av.8 °F (36.6 °C), Min:97.3 °F (36.3 °C), Max:98.2 °F (36.8 °C)      Exam:  Lungs:  clear to auscultation bilaterally  Heart:  regular rate and rhythm  Abdomen:  soft, non-tender. Bowel sounds normal. No masses,  no organomegaly  Urine in neal is grossly clear  Sternal wound with small defect lower 1/4 of wound--clean and dry      Labs:      Recent Labs     01/10/20  0341 20  0537 20  0301   WBC 4.9 5.7 5.3   HGB 8.2* 9.1* 9.2*   * 138* 139*   BUN 19 21* 23*   CREA 0.88 1.11 1.08   SGOT 18 21 18   AP 96 111 111   TBILI 0.3 0.5 0.6       Cultures:     No results found for: SDES  Lab Results   Component Value Date/Time    Culture result: NO GROWTH 2 DAYS 2020 02:04 PM    Culture result: NO GROWTH 3 DAYS 2020 11:53 AM    Culture result: LIGHT NORMAL RESPIRATORY TIAN 2019 04:18 PM       Radiology:     Line/Insert Date:           Assessment:     1. UTI--Serratia (2 biotypes) from culture and small amount of Enterococcus--now with Pseudomonas from culture of urine and blood  2. CHF/cardiomyopathy  3. ?aspiration event(s)  4. Renal insufficiency  5. Respiratory difficulties    Objective:     1. Continue current therapy  2. Presence of LVAD in face of bacteremia may require longer course of therapy, or at least PO Rx for a time  3.  Group will see over the weekend if asked      Alia , MD

## 2020-01-10 NOTE — PROGRESS NOTES
Transitions of Care Plan:     Patient medically progressing s/p LVAD implant, LFT brachial thrombectomy     Disposition: Inpatient Rehab - [facility] Inpatient [facility] referral sent yesterday    CM spoke with Ana Golas, admissions with [address] [place] [address] (p: [phone]), referral received on 1/9/2020. Bed not available until early to mid next week. Jag Torres requests updated PT and OT notes for next week. Requests detailed OT notes for iADLS (dressing, bathing, etc.) for next clinicals to be faxed. CM will continue to follow.     Dianne Liu, MPH

## 2020-01-11 NOTE — PROGRESS NOTES
[facility]   [address] [facility], [address], [facility]  Phone: [phone]   [phone]       Nephrology Progress Note  Milagro Holley     [birth year]     [id number]  Date of Admission : 10/25/2019  01/11/20    CC: Follow up for JUAN J, CKD, Hyponatremia      Assessment and Plan   JUAN J:  - Cr < 1 now   - despite BNP rising, no apparent fluid overload   - continue Florinef     Pseudomonal UTI and bacteremia:  - from cultures on 12/29. F/u Cx pending from today   - on cefepime per ID    Orthostatic hypotension w/ syncope:  - on midodrine and florinef    - stim test ok     Hyponatremia :  - Na stable at 136    Gross hematuria, BPH w/ retention:  - off CBI pre VAD. 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 CT in place    L arm clot s/p thrombectomy on 11/25    JOSE on CPAP      PAF s/p DCCV 6/19     Anemia:  - from blood loss s/p multiple blood products + IV iron  - cont PAVEL- lower the dose to weekly  when Hb > 10      Interval History:    Seen and examined.    He continues to have dizziness   I have also noticed worsening tremor today affecting head and hands     Review of Systems: pertinent systems reviewed and other wise negative     Current Medications:   Current Facility-Administered Medications   Medication Dose Route Frequency    cholecalciferol (VITAMIN D3) (1000 Units /25 mcg) tablet 2 Tab  2,000 Units Oral DAILY    clonazePAM (KlonoPIN) tablet 0.5 mg  0.5 mg Oral QHS    venlafaxine-SR (EFFEXOR-XR) capsule 150 mg  150 mg Oral DAILY WITH BREAKFAST    fludrocortisone (FLORINEF) tablet 0.1 mg  0.1 mg Oral DAILY    midodrine (PROAMITINE) tablet 5 mg  5 mg Oral TID WITH MEALS    hydrocortisone (CORTAID) 1 % cream   Topical TID PRN    sildenafil (pulm.hypertension) (REVATIO) tablet 20 mg  20 mg Oral TID    thiamine HCL (B-1) tablet 100 mg  100 mg Oral DAILY    levoFLOXacin (LEVAQUIN) 750 mg in D5W IVPB  750 mg IntraVENous Q24H    cefepime (MAXIPIME) 2 g in 0.9% sodium chloride (MBP/ADV) 100 mL  2 g IntraVENous Q8H    oxybutynin (DITROPAN) tablet 5 mg  5 mg Oral Q6H PRN    magnesium oxide (MAG-OX) tablet 800 mg  800 mg Oral BID    lidocaine (URO-JET/ GLYDO) 2 % jelly   Urethral PRN    epoetin yvonne-epbx (RETACRIT) injection 20,000 Units  20,000 Units SubCUTAneous Q MON, WED & FRI    albuterol-ipratropium (DUO-NEB) 2.5 MG-0.5 MG/3 ML  3 mL Nebulization Q6H PRN    therapeutic multivitamin (THERAGRAN) tablet 1 Tab  1 Tab Oral DAILY    alteplase (CATHFLO) 1 mg in sterile water (preservative free) 1 mL injection  1 mg InterCATHeter PRN    finasteride (PROSCAR) tablet 5 mg  5 mg Oral DAILY    diphenhydrAMINE (BENADRYL) capsule 25 mg  25 mg Oral QHS PRN    octreotide (SANDOSTATIN) injection 25 mcg  25 mcg SubCUTAneous TID    digoxin (LANOXIN) tablet 0.0625 mg  62.5 mcg Oral Q MON, WED & FRI    pantoprazole (PROTONIX) tablet 40 mg  40 mg Oral ACB    allopurinol (ZYLOPRIM) tablet 100 mg  100 mg Oral DAILY    arformoterol (BROVANA) neb solution 15 mcg  15 mcg Nebulization BID RT    And    budesonide (PULMICORT) 500 mcg/2 ml nebulizer suspension  500 mcg Nebulization BID RT    lactobac ac& pc-s.therm-b.anim (TIAN Q/RISAQUAD)  1 Cap Oral DAILY    oxyCODONE IR (ROXICODONE) tablet 5 mg  5 mg Oral Q6H PRN    tamsulosin (FLOMAX) capsule 0.4 mg  0.4 mg Oral DAILY    Warfarin MD/NP dosing   Other PRN    sodium chloride (NS) flush 5-40 mL  5-40 mL IntraVENous Q8H    sodium chloride (NS) flush 5-40 mL  5-40 mL IntraVENous PRN    acetaminophen (TYLENOL) tablet 650 mg  650 mg Oral Q6H PRN    naloxone (NARCAN) injection 0.4 mg  0.4 mg IntraVENous PRN    ondansetron (ZOFRAN) injection 4 mg  4 mg IntraVENous Q4H PRN    sucralfate (CARAFATE) tablet 1 g  1 g Oral AC&HS    influenza vaccine 2019-20 (6 mos+)(PF) (FLUARIX/FLULAVAL/FLUZONE QUAD) injection 0.5 mL  0.5 mL IntraMUSCular PRIOR TO DISCHARGE    hydrALAZINE (APRESOLINE) 20 mg/mL injection 20 mg  20 mg IntraVENous Q6H PRN      No Known Allergies    Objective:  Vitals:    Vitals:    01/10/20 1200 01/10/20 1620 01/10/20 1911 01/10/20 2348   BP:       Pulse: 92 87 84    Resp: 18 20 22 20   Temp: 98.1 °F (36.7 °C) 98 °F (36.7 °C) 97.7 °F (36.5 °C) 97.8 °F (36.6 °C)   SpO2: 99% 97% 98% 98%   Weight:       Height:         Intake and Output:  No intake/output data recorded. 01/09 1901 - 01/11 0700  In: 56 [P.O.:890]  Out: 3100 [Urine:3100]    Physical Examination:    General: No distress   Neck:  No lines   Resp:  CTA b/l  CV:  VAD sounds; No LE edema  GI:  Soft and non-tender; no distension  Neurologic:  AAO X 4  :  CONDOM cath     [x]    High complexity decision making was performed  [x]    Patient is at high-risk of decompensation with multiple organ involvement    Lab Data Personally Reviewed: I have reviewed all the pertinent labs, microbiology data and radiology studies during assessment.     Recent Labs     01/11/20  0403 01/10/20  0341 01/09/20  0537    136 133*   K 3.9 3.9 4.3    108 104   CO2 25 22 24   GLU 99 99 103*   BUN 23* 19 21*   CREA 0.96 0.88 1.11   CA 8.8 7.8* 9.1   MG 2.0 1.6 1.8   ALB 2.3* 2.1* 2.4*   SGOT 18 18 21   ALT 16 18 17   INR 1.8* 1.8* 1.7*     Recent Labs     01/11/20  0403 01/10/20  0341 01/09/20  0537   WBC 5.4 4.9 5.7   HGB 9.0* 8.2* 9.1*   HCT 30.0* 27.2* 29.3*   * 123* 138*     No results found for: SDES  Lab Results   Component Value Date/Time    Culture result: NO GROWTH 2 DAYS 01/08/2020 02:04 PM    Culture result: NO GROWTH 3 DAYS 01/07/2020 11:53 AM    Culture result: LIGHT NORMAL RESPIRATORY TIAN 12/31/2019 04:18 PM    Culture result: (A) 12/31/2019 03:24 PM     PSEUDOMONAS AERUGINOSA ISOLATED FROM 2 OF 4 BOTTLES DRAWN, EACH FROM A DIFFERENT SITE. .. RFA AND LEFT WRIST    Culture result: (A) 12/31/2019 03:24 PM     PRELIMINARY REPORT OF GRAM NEGATIVE RODS GROWING IN 1 OF 4 BOTTLES DRAWN CALLED TO AND READ BACK BY Rob Rios RN AT 2280 ON 1.1.20 RB    Culture result: (A) 12/31/2019 03:24 PM     PRELIMINARY REPORT OF GRAM NEGATIVE RODS GROWING IN 2ND OF 4 BOTTLES DRAWN (DIFFERENT SITE=L WRIST) CALLED TO AND READ BACK BY Rob Rios RN AT 15:51 ON 1/1/20 BY [facility]. Culture result: REMAINING BOTTLE(S) HAS/HAVE NO GROWTH IN 5 DAYS 12/31/2019 03:24 PM     Recent Results (from the past 24 hour(s))   METABOLIC PANEL, COMPREHENSIVE    Collection Time: 01/11/20  4:03 AM   Result Value Ref Range    Sodium 136 136 - 145 mmol/L    Potassium 3.9 3.5 - 5.1 mmol/L    Chloride 105 97 - 108 mmol/L    CO2 25 21 - 32 mmol/L    Anion gap 6 5 - 15 mmol/L    Glucose 99 65 - 100 mg/dL    BUN 23 (H) 6 - 20 MG/DL    Creatinine 0.96 0.70 - 1.30 MG/DL    BUN/Creatinine ratio 24 (H) 12 - 20      GFR est AA >60 >60 ml/min/1.73m2    GFR est non-AA >60 >60 ml/min/1.73m2    Calcium 8.8 8.5 - 10.1 MG/DL    Bilirubin, total 0.4 0.2 - 1.0 MG/DL    ALT (SGPT) 16 12 - 78 U/L    AST (SGOT) 18 15 - 37 U/L    Alk.  phosphatase 112 45 - 117 U/L    Protein, total 6.4 6.4 - 8.2 g/dL    Albumin 2.3 (L) 3.5 - 5.0 g/dL    Globulin 4.1 (H) 2.0 - 4.0 g/dL    A-G Ratio 0.6 (L) 1.1 - 2.2     MAGNESIUM    Collection Time: 01/11/20  4:03 AM   Result Value Ref Range    Magnesium 2.0 1.6 - 2.4 mg/dL   CBC W/O DIFF    Collection Time: 01/11/20  4:03 AM   Result Value Ref Range    WBC 5.4 4.1 - 11.1 K/uL    RBC 3.08 (L) 4.10 - 5.70 M/uL    HGB 9.0 (L) 12.1 - 17.0 g/dL    HCT 30.0 (L) 36.6 - 50.3 %    MCV 97.4 80.0 - 99.0 FL    MCH 29.2 26.0 - 34.0 PG    MCHC 30.0 30.0 - 36.5 g/dL    RDW 20.7 (H) 11.5 - 14.5 %    PLATELET 864 (L) 437 - 400 K/uL    MPV 9.9 8.9 - 12.9 FL    NRBC 0.0 0  WBC    ABSOLUTE NRBC 0.00 0.00 - 0.01 K/uL   PROTHROMBIN TIME + INR    Collection Time: 01/11/20  4:03 AM   Result Value Ref Range    INR 1.8 (H) 0.9 - 1.1      Prothrombin time 17.7 (H) 9.0 - 11.1 sec   PTT    Collection Time: 01/11/20  4:03 AM   Result Value Ref Range    aPTT 34.5 (H) 22.1 - 32.0 sec    aPTT, therapeutic range     58.0 - 77.0 SECS   DIGOXIN    Collection Time: 01/11/20  4:03 AM   Result Value Ref Range    Digoxin level 0.4 (L) 0.90 - 2.00 NG/ML   LACTIC ACID    Collection Time: 01/11/20  4:03 AM   Result Value Ref Range    Lactic acid 1.1 0.4 - 2.0 MMOL/L   PROCALCITONIN    Collection Time: 01/11/20  4:03 AM   Result Value Ref Range    Procalcitonin 0.11 ng/mL   LD    Collection Time: 01/11/20  4:03 AM   Result Value Ref Range     85 - 241 U/L   NT-PRO BNP    Collection Time: 01/11/20  4:03 AM   Result Value Ref Range    NT pro-BNP 4,058 (H) <125 PG/ML                 Alexandria Acosta MD

## 2020-01-11 NOTE — PROGRESS NOTES
Bedside and Verbal shift change report given to [facility] (oncoming nurse) by Bentley Masters (offgoing nurse). Report included the following information SBAR, Kardex, Procedure Summary, Intake/Output, MAR, Recent Results, and Cardiac Rhythm PACED       1500: Pt off floor for outing with RN and wife. Emergency LVAD equipment taken during outing. Pt tolerated well. 1525: Pt and RN back to floor. Bedside and Verbal shift change report given to [facility] (oncoming nurse) by Ana WILKES (offgoing nurse). Report included the following information SBAR, Kardex, Procedure Summary, Intake/Output, MAR, Recent Results and Cardiac Rhythm PACED. Problem: Falls - Risk of  Goal: *Absence of Falls  Description  Document Frida Corral Fall Risk and appropriate interventions in the flowsheet. Outcome: Progressing Towards Goal  Note: Fall Risk Interventions:  Mobility Interventions: Communicate number of staff needed for ambulation/transfer, OT consult for ADLs, Patient to call before getting OOB, PT Consult for mobility concerns, PT Consult for assist device competence, Utilize walker, cane, or other assistive device, Utilize gait belt for transfers/ambulation    Mentation Interventions: Adequate sleep, hydration, pain control, Evaluate medications/consider consulting pharmacy    Medication Interventions: Evaluate medications/consider consulting pharmacy, Patient to call before getting OOB, Teach patient to arise slowly, Utilize gait belt for transfers/ambulation    Elimination Interventions: Call light in reach, Patient to call for help with toileting needs, Toileting schedule/hourly rounds    History of Falls Interventions: Evaluate medications/consider consulting pharmacy         Problem: Pain  Goal: *Control of Pain  Outcome: Progressing Towards Goal     Problem: Pressure Injury - Risk of  Goal: *Prevention of pressure injury  Description  Document Mich Scale and appropriate interventions in the flowsheet.   Outcome: Progressing Towards Goal  Note: Pressure Injury Interventions:  Sensory Interventions: Assess changes in LOC    Moisture Interventions: Absorbent underpads    Activity Interventions: Chair cushion, Increase time out of bed, Pressure redistribution bed/mattress(bed type), PT/OT evaluation    Mobility Interventions: Chair cushion, Float heels, HOB 30 degrees or less, Pressure redistribution bed/mattress (bed type), PT/OT evaluation    Nutrition Interventions: Document food/fluid/supplement intake    Friction and Shear Interventions: HOB 30 degrees or less, Lift sheet                Problem: Heart Failure: Discharge Outcomes  Goal: *Demonstrates ability to perform prescribed activity without shortness of breath or discomfort  Outcome: Progressing Towards Goal  Goal: *Verbalizes understanding and describes prescribed diet  Outcome: Progressing Towards Goal  Goal: *Verbalizes understanding/describes prescribed medications  Outcome: Progressing Towards Goal  Goal: *Describes available resources and support systems  Description  (eg: Home Health, Palliative Care, Advanced Medical Directive)  Outcome: Progressing Towards Goal  Goal: *Describes smoking cessation resources  Outcome: Progressing Towards Goal     Problem: Diabetes Self-Management  Goal: *Using medications safely  Description  State effect of diabetes medications on diabetes; name diabetes medication taking, action and side effects. Outcome: Progressing Towards Goal  Goal: *Monitoring blood glucose, interpreting and using results  Description  Identify recommended blood glucose targets  and personal targets. Outcome: Progressing Towards Goal  Goal: *Prevention, detection, treatment of acute complications  Description  List symptoms of hyper- and hypoglycemia; describe how to treat low blood sugar and actions for lowering  high blood glucose level.   Outcome: Progressing Towards Goal  Goal: *Prevention, detection and treatment of chronic complications  Description  Define the natural course of diabetes and describe the relationship of blood glucose levels to long term complications of diabetes.   Outcome: Progressing Towards Goal     Problem: Infection - Risk of, Urinary Catheter-Associated Urinary Tract Infection  Goal: *Absence of infection signs and symptoms  Outcome: Progressing Towards Goal     Problem: LVAD Post-op Day 15 to Discharge  Goal: Activity/Safety  Outcome: Progressing Towards Goal  Goal: Consults, if ordered  Outcome: Progressing Towards Goal  Goal: Diagnostic Test/Procedures  Outcome: Progressing Towards Goal  Goal: Nutrition/Diet  Outcome: Progressing Towards Goal  Goal: Medications  Outcome: Progressing Towards Goal  Goal: Discharge Planning  Outcome: Progressing Towards Goal  Goal: Respiratory  Outcome: Progressing Towards Goal  Goal: Treatments/Interventions/Procedures  Outcome: Progressing Towards Goal  Goal: Psychosocial  Outcome: Progressing Towards Goal

## 2020-01-11 NOTE — PROGRESS NOTES
1940: Report received from Renu Boston RN    0830: Bedside and Verbal shift change report given to Doni Ram RN (oncoming nurse) by Dain Brasher RN (offgoing nurse). Report included the following information SBAR, Kardex, Intake/Output, MAR, Recent Results and Cardiac Rhythm paced.

## 2020-01-12 NOTE — PROGRESS NOTES
[facility] in [place], [place]  Inpatient Progress Note      Patient name: Eliz Benavidez  Patient : 1950  Patient MRN: [id number]  Attending MD: Jennifer Mitchell MD  Date of service: 20    Chief Complaint:   Izetta Slate azucena LVAD implant     HPI: Sona Kiser is a 71y.o. year old pleasant white male with a history of HTN, HLD, JOSE, CAD s/p cardiac arrest VFib s/p CABG () c/b sternal would infection and sternectomy, ischemic cardiomyopathy LVEF 15-20%, s/p ICD and with LBBB. He was admitted with acute on chronic systolic heart failure with massive volume overload > 20 lbs, in the setting of atrial fibrillation s/p failed DCCV and underwent DCCV and RHC on .  S/p BiVICD on 19 with Dr. Meridith Koyanagi. He was discharged home home on IV milrinone on 19. Jeana Mooney has been followed closely by Dr. Ramon Ceballos and the [facility].     Mr. Kiser was admitted for acute on chronic systolic heart failure. He underwent implantation of Impella 5.0 due to CS and has completed his LVAD evaluation. Jeana Mooney meets criteria for implant of HM3 as DT. He was NYHA Class IV prior to implant of Impella 5.0, has LVEF < 15%, was intolerant of GDMT due to symptomatic hypotension and renal dysfunction. He remains dependent on temporary MCS support. RHC  revealed compensated hemodynamics on Impella. His renal function has improved dramatically with improvement in his hemodynamics. He is not a suitable transplant candidate due to single kidney, sternectomy, debility, and frailty. He was reviewed by [address] [place] [address] and felt to be a high risk heart transplant candidate due to multiple co-morbidities as well as the afore mentioned conditions.  He remained in the CCU and underwent a HM 3 implant as DT on .   He was weaned off of pressors and transferred to [place] [address] where he continues to undergo PT/OT and hemodynamic optimization.       24Hr Events:   SOB this am  More Fatigued  Outside trip yesterday Procedure:  Procedure(s):  REMOVAL TEMPORARY LEFT VENTRICULAR ASSIST DEVICE, IMPLANTATION OF LEFT VENTRICULAR ASSIST DEVICE PERMANENT (VAD), ECC, JACQUE, EPI AORTIC US BY DR Albertina Haas.     POD:  55     Impression / Plan:   Heart Failure Status: NYHA Class IV, improved to NYHA Class III    Chronic systolic heart failure due to ICM, NYHA Class IV, EF < 15%, cardiogenic shock bridged with Impella 5.0 support to HM 3 implant   S/p Impella removal and LVAD implant 11/18/19  RPMs stable at 6600   Multiple PI events on interrogation - improved  Continue Sildenafil 20 mg PO TID  Intolerant of BB due to RV failure  Intolerant of ACEi/ARB/ARNI/AA due to JUAN J on CKD 3  Intolerant of spironolactone d/t IVVD   Continue midodrine 5 mg po TID for orthostatic hypotension  Tiny dose of bumex 0.5mg once   Strict I/O, daily weights  Continue LVAD education   Dressing change frequency three times weekly, sutures removed 12/26/19    Bacteremia - Pseudomonas  Blood cultures (12/31/19) 2/4 bottles +Pseudomonas & 2/4 bottles GNRs    Repeat BC NGTD  On IV Cefepime and Levaquin   Follow procalcitonin and lactic acid levels - both improving    Orthostatic Hypotension  Midodrine 5 mg PO TID  Cont Florinef 0.1 mg to BID  Stim test unremarkable    Generalized myoclonus   Improved   Appreciate Neurology input  Discontinued Keppra due to dizziness   Head CT negative for acute process  EEG suggestive of mild generalized encephalopathic process, possibly related to underlying structural brain injury vs metabolic abnormality  Monitor closely      Anticoagulation for LVAD, INR goal 1.5-2  Goal decreased d/t persistent hematuria   No ASA d/t hematuria  INR 1.8  Coumadin - 4 mg tonight    Epistaxis  Resolved      Acute Respiratory Failure post op  Improved with aggressive diuresis  Off supplemental O2  Pulmonary hygiene   Cont home CPAP- must use while sleeping - will have hospital RT adjust mask/settings to improve patient compliance   Schedule PET CT prior to discharge - ordered for Monday 1/13    Acute on CKD 3, atrophic left kidney  Appreciate Nephrology assistance  Watch Cr, stable  Hold diuretics   Avoid nephrotoxins, trend labs      CAD s/p CABG   Off ASA d/t hematuria  No BB d/t RV failure  Hold statin due to recent hepatic failure   LHC performed 11/13 - low likelihood of benefit from revascularization      Hx of VF arrest s/p BiV-ICD  No recent shocks  Keep K > 4 and Mg > 2   Mag ox 800 mg po BID  ICD reprogrammed to reduce diaphragmatic stimulation     PAF   Dig level 0.5-cont dig (62.5 mcg qMWFSat)  No BB due to RV failure   Repeat TFTs WNL  Interrogate ICD to eval AF burden      Hx of gout  Uric acid WNL    Acute blood loss anemia  hgb stable  Likely due to hematuria  Cont octreotide 25 mcg SQ TID   Fecal occult 12/14 negative, positive on 12/17  Appreciate urology recommendations  Hematuria improved  Monitor for now     Urinary retention, c/b serratia UTI and hematuria  Appreciate Urology consult   Repeat UA with cx negative 12/15   Cystoscopy performed 11/13 shows catheter induced cystitis   Pseudomonas aeruginosa positive UA culture (12/31/19) - on Cefepmine and levaquin     Malnutrition   Eating better  Prealbumin weekly - 13.3 on 1/6  Appreciate Nutritionist assistance  Diet as tolerated, encourage food from home, protein shakes  Intolerant of mirtazapine d/t tremors, confusion   Cont MVI add thiamine 100mg daily   Hold on DHT placement for now     COPD   Appreciate Pulmonology assistance   Continue nebs PRN       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 Bridget Dailey, will continue IV abx and wet to dry dressings.   Will likely require sternal wire after ~3 months from op date     Vocal cord paralysis  Improved  ENT recs appreciated  Speech therapy following - appreciate input    Anxiety  Change Klonopin to 0.5 mg PO qHS    Depression  Cont Effexor to 150 mg PO qAM   Monitor rhythm strips for prolonged QTc     Debility  Continue PT/OT     Bladder spasms  Received pyridium 100mg x4 doses  Oxybutynin 5mg Q6hr prn      Ppx  Protonix   PT/OT   +daily BM   PICC placed 11/22/19      Dispo:  Remain in CVSU at this time  Will need IP rehab. Not ready for discharge at this time        LVAD INTERROGATION:  Device interrogated in person  Device function normal, normal flow, multiple PI events    LVAD   Pump Speed (RPM): 6600  Pump Flow (LPM): 6.1  MAP: 76  PI (Pulsitility Index): 3.1  Power: 6   Test: Yes  Back Up  at Bedside & Labeled: Yes  Power Module Test: Yes  Driveline Site Care: No  Driveline Dressing: Clean, Dry, and Intact  Outpatient: No  MAP in Therapeutic Range (Outpatient): Yes  Testing  Alarms Reviewed: Yes  Back up SC speed: 6600  Back up Low Speed Limit: 6000  Emergency Equipment with Patient?: Yes  Emergency procedures reviewed?: Yes  Drive line site inspected?: Yes  Drive line intergrity inspected?: Yes  Drive line dressing changed?: No    PHYSICAL EXAM:  Visit Vitals  BP 91/72 (BP 1 Location: Left arm, BP Patient Position: At rest)   Pulse 83   Temp 98 °F (36.7 °C)   Resp 18   Ht 6' 2\" (1.88 m)   Wt 180 lb 8.9 oz (81.9 kg)   SpO2 97%   BMI 23.18 kg/m²       Physical Exam   Constitutional: He is oriented to person, place, and time. He appears well-developed. He appears cachectic. No distress. HENT:   Head: Normocephalic. Neck: Normal range of motion. Neck supple. No JVD present. Cardiovascular: Normal rate, regular rhythm and normal heart sounds. + VAD hum    Pulmonary/Chest: Effort normal and breath sounds normal. No tachypnea. No respiratory distress. Abdominal: Soft. Bowel sounds are normal. He exhibits no distension.    Musculoskeletal: Normal range of motion. General: No edema. Neurological: He is alert and oriented to person, place, and time. Skin: Skin is warm and dry. Psychiatric: He has a normal mood and affect. His speech is normal and behavior is normal.   ~1 cm x 1 cm x 0.25 cm area of delayed closure on sternotomy. Site clean, no erythema or drainage noted. Subcutaneous tissue noted. Nursing note and vitals reviewed. REVIEW OF SYSTEMS:  Review of Systems   Constitutional: Positive for malaise/fatigue. Negative for chills and fever. HENT: Positive for hearing loss. Negative for nosebleeds. Eyes: Negative. Respiratory: Negative for cough and shortness of breath. Mild dyspnea   Cardiovascular: Negative for chest pain, palpitations, orthopnea and leg swelling. Orthostatic   Gastrointestinal: Negative for heartburn, nausea and vomiting. Genitourinary: Negative for dysuria and hematuria. Musculoskeletal: Negative. Neurological: Positive for dizziness and weakness. Negative for headaches. Mild dizziness - improving    Endo/Heme/Allergies: Does not bruise/bleed easily.        PAST MEDICAL HISTORY:  Past Medical History:   Diagnosis Date    Degenerative disc disease, lumbar     Heart failure ([address]     High cholesterol     Hypertension     Paroxysmal atrial fibrillation ([address] 4/2/2019    Spinal stenosis        PAST SURGICAL HISTORY:  Past Surgical History:   Procedure Laterality Date    COLONOSCOPY Left 11/11/2019    COLONOSCOPY at bedside performed by Isha Su MD at [address] [place] [address] ENDOSCOPY    HX APPENDECTOMY      HX CORONARY ARTERY BYPASS GRAFT      triple    HX HERNIA REPAIR      HX IMPLANTABLE CARDIOVERTER DEFIBRILLATOR      AR CARDIOVERSION ELECTIVE ARRHYTHMIA EXTERNAL N/A 6/10/2019    EP CARDIOVERSION performed by Liz Renteria MD at [address] CATH LAB    AR CARDIOVERSION ELECTIVE ARRHYTHMIA EXTERNAL N/A 6/18/2019    EP CARDIOVERSION performed by Connie May [facility] at [facility] CARDIAC CATH LAB    IA INSJ/RPLCMT PERM DFB W/TRNSVNS LDS 1/DUAL CHMBR N/A 2019    INSERT ICD BIV MULTI performed by Mike Zamora MD at [address], [facility]/[address] CATH LAB    IA TCAT IMPL WRLS P-ART PRS SNR L-T HEMODYN MNTR N/A 2019    IMPLANT HEART FAILURE MONITORING DEVICE performed by Solitario Pulliam MD at [address], [facility] CATH LAB       FAMILY HISTORY:  Family History   Problem Relation Age of Onset    Lung Disease Mother     Hypertension Mother     Arthritis-osteo Mother     Heart Disease Mother     Heart Disease Father     Diabetes Father        SOCIAL HISTORY:  Social History     Socioeconomic History    Marital status:      Spouse name: Not on file    Number of children: Not on file    Years of education: Not on file    Highest education level: Not on file   Tobacco Use    Smoking status: Former Smoker     Last attempt to quit: 2010     Years since quittin.1    Smokeless tobacco: Never Used   Substance and Sexual Activity    Alcohol use: Not Currently     Comment: rarely    Drug use: Never   Other Topics Concern       LABORATORY RESULTS:     Labs Latest Ref Rng & Units 2020 2020 1/10/2020 2020 2020 2020 2020   WBC 4.1 - 11.1 K/uL 5.2 5.4 4.9 5.7 5.3 6.4 6.8   RBC 4.10 - 5.70 M/uL 2.94(L) 3.08(L) 2.80(L) 3.09(L) 3.15(L) 2.99(L) 2.98(L)   Hemoglobin 12.1 - 17.0 g/dL 8.7(L) 9.0(L) 8.2(L) 9.1(L) 9.2(L) 8.7(L) 8.5(L)   Hematocrit 36.6 - 50.3 % 28. 7(L) 30. 0(L) 27. 2(L) 29. 3(L) 29. 9(L) 28. 4(L) 28. 3(L)   MCV 80.0 - 99.0 FL 97.6 97.4 97.1 94.8 94.9 95.0 95.0   Platelets 908 - 449 K/uL 114(L) 127(L) 123(L) 138(L) 139(L) 138(L) 123(L)   Lymphocytes 12 - 49 % - - - - - - -   Monocytes 5 - 13 % - - - - - - -   Eosinophils 0 - 7 % - - - - - - -   Basophils 0 - 1 % - - - - - - -   Albumin 3.5 - 5.0 g/dL 2. 3(L) 2. 3(L) 2. 1(L) 2. 4(L) 2. 4(L) 2. 3(L) 2. 5(L)   Calcium 8.5 - 10.1 MG/DL 8.8 8.8 7.8(L) 9.1 8.9 8.7 8.9   SGOT 15 - 37 U/L 18 18 18 21 18 18 18   Glucose 65 - 100 mg/dL 99 99 99 103(H) 108(H) 114(H) 104(H)   BUN 6 - 20 MG/DL 23(H) 23(H) 19 21(H) 23(H) 26(H) 23(H)   Creatinine 0.70 - 1.30 MG/DL 0.90 0.96 0.88 1.11 1.08 1.04 1.11   Sodium 136 - 145 mmol/L 136 136 136 133(L) 133(L) 130(L) 131(L)   Potassium 3.5 - 5.1 mmol/L 3.8 3.9 3.9 4.3 4.6 4.8 4.9   TSH 0.36 - 3.74 uIU/mL - - - - - - -   LDH 85 - 241 U/L 192 206 186 203 198 202 186   Some recent data might be hidden     Lab Results   Component Value Date/Time    TSH 2.30 12/03/2019 03:29 AM    TSH 2.40 10/25/2019 07:39 PM    TSH 2.45 06/01/2019 04:16 AM       ALLERGY:  No Known Allergies     CURRENT MEDICATIONS:  Current Facility-Administered Medications   Medication Dose Route Frequency    fludrocortisone (FLORINEF) tablet 0.1 mg  0.1 mg Oral BID    digoxin (LANOXIN) tablet 0.0625 mg  62.5 mcg Oral Q M, W, F & SAT    warfarin (COUMADIN) tablet 4 mg  4 mg Oral QPM    cholecalciferol (VITAMIN D3) (1000 Units /25 mcg) tablet 2 Tab  2,000 Units Oral DAILY    clonazePAM (KlonoPIN) tablet 0.5 mg  0.5 mg Oral QHS    venlafaxine-SR (EFFEXOR-XR) capsule 150 mg  150 mg Oral DAILY WITH BREAKFAST    midodrine (PROAMITINE) tablet 5 mg  5 mg Oral TID WITH MEALS    hydrocortisone (CORTAID) 1 % cream   Topical TID PRN    sildenafil (pulm.hypertension) (REVATIO) tablet 20 mg  20 mg Oral TID    thiamine HCL (B-1) tablet 100 mg  100 mg Oral DAILY    levoFLOXacin (LEVAQUIN) 750 mg in D5W IVPB  750 mg IntraVENous Q24H    cefepime (MAXIPIME) 2 g in 0.9% sodium chloride (MBP/ADV) 100 mL  2 g IntraVENous Q8H    oxybutynin (DITROPAN) tablet 5 mg  5 mg Oral Q6H PRN    magnesium oxide (MAG-OX) tablet 800 mg  800 mg Oral BID    lidocaine (URO-JET/ GLYDO) 2 % jelly   Urethral PRN    epoetin yvonne-epbx (RETACRIT) injection 20,000 Units  20,000 Units SubCUTAneous Q MON, WED & FRI    albuterol-ipratropium (DUO-NEB) 2.5 MG-0.5 MG/3 ML  3 mL Nebulization Q6H PRN    therapeutic multivitamin (THERAGRAN) tablet 1 Tab  1 Tab Oral DAILY    alteplase (CATHFLO) 1 mg in sterile water (preservative free) 1 mL injection  1 mg InterCATHeter PRN    finasteride (PROSCAR) tablet 5 mg  5 mg Oral DAILY    diphenhydrAMINE (BENADRYL) capsule 25 mg  25 mg Oral QHS PRN    octreotide (SANDOSTATIN) injection 25 mcg  25 mcg SubCUTAneous TID    pantoprazole (PROTONIX) tablet 40 mg  40 mg Oral ACB    allopurinol (ZYLOPRIM) tablet 100 mg  100 mg Oral DAILY    arformoterol (BROVANA) neb solution 15 mcg  15 mcg Nebulization BID RT    And    budesonide (PULMICORT) 500 mcg/2 ml nebulizer suspension  500 mcg Nebulization BID RT    lactobac ac& pc-s.therm-b.anim (TIAN Q/RISAQUAD)  1 Cap Oral DAILY    oxyCODONE IR (ROXICODONE) tablet 5 mg  5 mg Oral Q6H PRN    tamsulosin (FLOMAX) capsule 0.4 mg  0.4 mg Oral DAILY    Warfarin MD/NP dosing   Other PRN    sodium chloride (NS) flush 5-40 mL  5-40 mL IntraVENous Q8H    sodium chloride (NS) flush 5-40 mL  5-40 mL IntraVENous PRN    acetaminophen (TYLENOL) tablet 650 mg  650 mg Oral Q6H PRN    naloxone (NARCAN) injection 0.4 mg  0.4 mg IntraVENous PRN    ondansetron (ZOFRAN) injection 4 mg  4 mg IntraVENous Q4H PRN    sucralfate (CARAFATE) tablet 1 g  1 g Oral AC&HS    influenza vaccine 2019-20 (6 mos+)(PF) (FLUARIX/FLULAVAL/FLUZONE QUAD) injection 0.5 mL  0.5 mL IntraMUSCular PRIOR TO DISCHARGE    hydrALAZINE (APRESOLINE) 20 mg/mL injection 20 mg  20 mg IntraVENous Q6H PRN         Thank you for allowing me to participate in this patient's care.     Toby Posada NP  [address] [place] [address], [address]  Phone: [phone]  Fax: [phone]

## 2020-01-12 NOTE — PROGRESS NOTES
1930: Bedside shift change report given to Christiano Griffiths (oncoming nurse) by Traci Garsia (offgoing nurse). Report included the following information SBAR, Kardex, Intake/Output, MAR, Recent Results, and Cardiac Rhythm paced. .     Problem: Falls - Risk of  Goal: *Absence of Falls  Description  Document Heatherlashaelucie  Fall Risk and appropriate interventions in the flowsheet. Outcome: Progressing Towards Goal  Note: Fall Risk Interventions:  Mobility Interventions: Communicate number of staff needed for ambulation/transfer, OT consult for ADLs, Patient to call before getting OOB, PT Consult for mobility concerns    Mentation Interventions: Adequate sleep, hydration, pain control, Evaluate medications/consider consulting pharmacy    Medication Interventions: Patient to call before getting OOB, Teach patient to arise slowly    Elimination Interventions: Call light in reach, Patient to call for help with toileting needs    History of Falls Interventions: Evaluate medications/consider consulting pharmacy         Problem: Pressure Injury - Risk of  Goal: *Prevention of pressure injury  Description  Document Mich Scale and appropriate interventions in the flowsheet.   Outcome: Progressing Towards Goal  Note: Pressure Injury Interventions:  Sensory Interventions: Assess changes in LOC    Moisture Interventions: Absorbent underpads    Activity Interventions: Pressure redistribution bed/mattress(bed type), PT/OT evaluation, Increase time out of bed    Mobility Interventions: Pressure redistribution bed/mattress (bed type), PT/OT evaluation    Nutrition Interventions: Document food/fluid/supplement intake, Offer support with meals,snacks and hydration    Friction and Shear Interventions: HOB 30 degrees or less, Lift sheet                Problem: Heart Failure: Discharge Outcomes  Goal: *Demonstrates ability to perform prescribed activity without shortness of breath or discomfort  Outcome: Progressing Towards Goal  Note:   Currently still having issues with orthostatic BP. Pt eager to continue to progress.

## 2020-01-12 NOTE — PROGRESS NOTES
Bedside and Verbal shift change report given to Justice (oncoming nurse) by Kamryn Jaramillo (offgoing nurse). Report included the following information SBAR, Kardex, Procedure Summary, Intake/Output, MAR, Recent Results, and Cardiac Rhythm PACED .        0930: Pt nauseas this AM. Unable to eat breakfast. Zofran PRN given. Pt complaining of SOB. Upon auscultation pt has crackles in bases of lungs. Pt showing increased tremors in BUE as well. Pt fatigued. MAP this AM: 94. HR: 96. 02: 100%. Pt hot to the touch but oral temperature is 98.1. Gave morning BP/cardiac meds. Held other AM meds d/t nausea. Pt resting now in bed. Will notify [facility] team.    [phone]: Pt still with strong moist cough. Lungs sound like they have more crackles than this AM. Still has NC in place for comfort. Pt has not eaten most of day d/t nausea. Will give other dose of IV Zofran in order to help appetite. Spoke with Billie Matamoros NP regarding this information and obtained orders for one time dose Bumex 0.5mg PO. Bedside and Verbal shift change report given to Kamryn Jaramillo (oncoming nurse) by North Max RN (offgoing nurse). Report included the following information SBAR, Kardex, Procedure Summary, Intake/Output, MAR, Recent Results and Cardiac Rhythm PACED. Problem: Falls - Risk of  Goal: *Absence of Falls  Description  Document Solange De Jesus Fall Risk and appropriate interventions in the flowsheet.   Outcome: Progressing Towards Goal  Note: Fall Risk Interventions:  Mobility Interventions: Communicate number of staff needed for ambulation/transfer, Patient to call before getting OOB    Mentation Interventions: Adequate sleep, hydration, pain control, Evaluate medications/consider consulting pharmacy    Medication Interventions: Patient to call before getting OOB, Teach patient to arise slowly    Elimination Interventions: Call light in reach, Patient to call for help with toileting needs    History of Falls Interventions: Evaluate medications/consider consulting pharmacy         Problem: Pressure Injury - Risk of  Goal: *Prevention of pressure injury  Description  Document Mich Scale and appropriate interventions in the flowsheet.   Outcome: Progressing Towards Goal  Note: Pressure Injury Interventions:  Sensory Interventions: Assess changes in LOC    Moisture Interventions: Absorbent underpads    Activity Interventions: Increase time out of bed, Pressure redistribution bed/mattress(bed type), PT/OT evaluation    Mobility Interventions: Pressure redistribution bed/mattress (bed type)    Nutrition Interventions: Document food/fluid/supplement intake, Offer support with meals,snacks and hydration    Friction and Shear Interventions: HOB 30 degrees or less, Lift sheet                Problem: Heart Failure: Discharge Outcomes  Goal: *Demonstrates ability to perform prescribed activity without shortness of breath or discomfort  Outcome: Progressing Towards Goal  Goal: *Left ventricular function assessment completed prior to or during stay, or planned for post-discharge  Outcome: Progressing Towards Goal  Goal: *ACEI prescribed if LVEF less than 40% and no contraindications or ARB prescribed  Outcome: Progressing Towards Goal  Goal: *Verbalizes understanding and describes prescribed diet  Outcome: Progressing Towards Goal  Goal: *Verbalizes understanding/describes prescribed medications  Outcome: Progressing Towards Goal  Goal: *Describes available resources and support systems  Description  (eg: Home Health, Palliative Care, Advanced Medical Directive)  Outcome: Progressing Towards Goal  Goal: *Describes smoking cessation resources  Outcome: Progressing Towards Goal  Goal: *Understands and describes signs and symptoms to report to providers(Stroke Metric)  Outcome: Progressing Towards Goal  Goal: *Describes/verbalizes understanding of follow-up/return appt  Description  (eg: to physicians, diabetes treatment coordinator, and other resources  Outcome: Progressing Towards Goal  Goal: *Describes importance of continuing daily weights and changes to report to physician  Outcome: Progressing Towards Goal     Problem: LVAD Post-op Day 15 to Discharge  Goal: Activity/Safety  Outcome: Progressing Towards Goal  Goal: Consults, if ordered  Outcome: Progressing Towards Goal  Goal: Diagnostic Test/Procedures  Outcome: Progressing Towards Goal  Goal: Nutrition/Diet  Outcome: Progressing Towards Goal  Goal: Medications  Outcome: Progressing Towards Goal  Goal: Discharge Planning  Outcome: Progressing Towards Goal  Goal: Respiratory  Outcome: Progressing Towards Goal  Goal: Treatments/Interventions/Procedures  Outcome: Progressing Towards Goal  Goal: Psychosocial  Outcome: Progressing Towards Goal

## 2020-01-13 NOTE — PROGRESS NOTES
ID Progress Note  2020    Subjective:     Not having a great day today. Reportedly, he was febrile over the weekend, but I do not see that in the vital sign record. Objective:     Antibiotics:  1. Levaquin  2. Cefepime   3. Rifampin   4. Fluconazole  5. Vancomycin    6. Cefazolin   7. Zosyn       Vitals:   Visit Vitals  BP 91/72 (BP 1 Location: Left arm, BP Patient Position: At rest)   Pulse 84   Temp 98.9 °F (37.2 °C)   Resp 22   Ht 6' 2\" (1.88 m)   Wt 82.1 kg (181 lb)   SpO2 96%   BMI 23.24 kg/m²        Tmax:  Temp (24hrs), Av °F (37.2 °C), Min:98 °F (36.7 °C), Max:100.2 °F (37.9 °C)      Exam:  Lungs: A few basilar rales  Heart:  regular rate and rhythm  Abdomen:  soft, non-tender. Bowel sounds normal. No masses,  no organomegaly  Urine in neal is grossly clear  Sternal wound with small defect lower 1/4 of wound--clean and dry      Labs:      Recent Labs     20  0356 20  0433 20  0403   WBC 4.1 5.2 5.4   HGB 8.6* 8.7* 9.0*   * 114* 127*   BUN 18 23* 23*   CREA 0.90 0.90 0.96   SGOT 22 18 18    101 112   TBILI 0.6 0.4 0.4       Cultures:     No results found for: [facility]  Lab Results   Component Value Date/Time    Culture result: NO GROWTH 3 DAYS 2020 02:04 PM    Culture result: NO GROWTH 5 DAYS 2020 11:53 AM    Culture result: LIGHT NORMAL RESPIRATORY TIAN 2019 04:18 PM       Radiology:     Line/Insert Date:           Assessment:     1. UTI--Serratia (2 biotypes) from culture and small amount of Enterococcus--now with Pseudomonas from culture of urine and blood  2. CHF/cardiomyopathy  3. ?aspiration event(s)  4. Renal insufficiency  5. Respiratory difficulties    Objective:     1. Continue current therapy  2. Presence of LVAD in face of bacteremia may require longer course of therapy, or at least PO Rx for a time  3. Work-up any fever  4. Follow-up pending cultures and studies  5.  Discussed with his wife at bedside      Elsie Madsen MD

## 2020-01-13 NOTE — PROGRESS NOTES
1930; Bedside shift change report given to Caitlyn Colunga (oncoming nurse) by Carolina Romano (offgoing nurse). Report included the following information SBAR, Kardex, Intake/Output, MAR, Recent Results, and Cardiac Rhythm paced . Problem: Falls - Risk of  Goal: *Absence of Falls  Description  Document Edgar Brunner Fall Risk and appropriate interventions in the flowsheet. Outcome: Progressing Towards Goal  Note: Fall Risk Interventions:  Mobility Interventions: Communicate number of staff needed for ambulation/transfer, OT consult for ADLs, Patient to call before getting OOB, PT Consult for mobility concerns    Mentation Interventions: Adequate sleep, hydration, pain control, Evaluate medications/consider consulting pharmacy    Medication Interventions: Patient to call before getting OOB, Teach patient to arise slowly    Elimination Interventions: Call light in reach, Patient to call for help with toileting needs    History of Falls Interventions: Evaluate medications/consider consulting pharmacy         Problem: Pressure Injury - Risk of  Goal: *Prevention of pressure injury  Description  Document Mich Scale and appropriate interventions in the flowsheet. Outcome: Progressing Towards Goal  Note: Pressure Injury Interventions:  Sensory Interventions: Assess changes in LOC    Moisture Interventions: Absorbent underpads    Activity Interventions: Increase time out of bed, Pressure redistribution bed/mattress(bed type), PT/OT evaluation    Mobility Interventions: Pressure redistribution bed/mattress (bed type), PT/OT evaluation    Nutrition Interventions: Document food/fluid/supplement intake, Offer support with meals,snacks and hydration    Friction and Shear Interventions: Lift sheet, HOB 30 degrees or less                Problem: LVAD: Discharge Outcomes  Goal: *Hemodynamically stable  Outcome: Progressing Towards Goal  Note:   Stable MAP.   Pt still orthostatic  Goal: *Lungs clear or at baseline  Outcome: Progressing Towards Goal  Note:   Lung sounds clear. C/o SOB today. Goal: *Optimal pain control at patient's stated goal  Outcome: Progressing Towards Goal  Note:   No c/o pain.

## 2020-01-13 NOTE — PROGRESS NOTES
[facility] in [place], [place]  Inpatient Progress Note      Patient name: Eliz Benavidez  Patient : 1950  Patient MRN: [id number]  Attending MD: Jennifer Mitchell MD  Date of service: 20    Chief Complaint:   Izetta Slate azucena LVAD implant     HPI: Sona Kiser is a 71y.o. year old pleasant white male with a history of HTN, HLD, JOSE, CAD s/p cardiac arrest VFib s/p CABG () c/b sternal would infection and sternectomy, ischemic cardiomyopathy LVEF 15-20%, s/p ICD and with LBBB. He was admitted with acute on chronic systolic heart failure with massive volume overload > 20 lbs, in the setting of atrial fibrillation s/p failed DCCV and underwent DCCV and RHC on .  S/p BiVICD on 19 with Dr. Meridith Koyanagi. He was discharged home home on IV milrinone on 19. Jeana Mooney has been followed closely by Dr. Ramon Ceballos and the [facility].     Mr. Kiser was admitted for acute on chronic systolic heart failure. He underwent implantation of Impella 5.0 due to CS and has completed his LVAD evaluation. Jeana Mooney meets criteria for implant of HM3 as DT. He was NYHA Class IV prior to implant of Impella 5.0, has LVEF < 15%, was intolerant of GDMT due to symptomatic hypotension and renal dysfunction. He remains dependent on temporary MCS support. RHC  revealed compensated hemodynamics on Impella. His renal function has improved dramatically with improvement in his hemodynamics. He is not a suitable transplant candidate due to single kidney, sternectomy, debility, and frailty. He was reviewed by [address] [place] [address] and felt to be a high risk heart transplant candidate due to multiple co-morbidities as well as the afore mentioned conditions.  He remained in the CCU and underwent a HM 3 implant as DT on .   He was weaned off of pressors and transferred to [place] [address] where he continues to undergo PT/OT and hemodynamic optimization.       24Hr Events:   Less SOB  Febrile overnight  Nausea improved  States he feels terrible today     Procedure:  Procedure(s):  REMOVAL TEMPORARY LEFT VENTRICULAR ASSIST DEVICE, IMPLANTATION OF LEFT VENTRICULAR ASSIST DEVICE PERMANENT (VAD), ECC, JACQUE, EPI AORTIC US BY DR Yasmeen Hansen.     POD:  56     Impression / Plan:   Heart Failure Status: NYHA Class IV, improved to NYHA Class III    Chronic systolic heart failure due to ICM, NYHA Class IV, EF < 15%, cardiogenic shock bridged with Impella 5.0 support to HM 3 implant   S/p Impella removal and LVAD implant 11/18/19  RPMs stable at 6600   Multiple PI events on interrogation - improved  Continue Sildenafil 20 mg PO TID  Intolerant of BB due to RV failure  Intolerant of ACEi/ARB/ARNI/AA due to JUAN J on CKD 3  Intolerant of spironolactone d/t IVVD   Continue midodrine 5 mg po TID for orthostatic hypotension  No diuretics today   Strict I/O, daily weights  Continue LVAD education   Dressing change frequency three times weekly, sutures removed 12/26/19  Ramp test today at 1pm    Bacteremia - Pseudomonas  Blood cultures (12/31/19) 2/4 bottles +Pseudomonas & 2/4 bottles GNRs    Repeat BC NGTD  On IV Cefepime and Levaquin   Follow procalcitonin and lactic acid levels - both improving  Repeat sputum and UA today- temp 100.2    Orthostatic Hypotension  Midodrine 5 mg PO TID  Decrease Florinef 0.1 mg to daily  Stim test unremarkable    Generalized myoclonus   Improved   Appreciate Neurology input  Discontinued Keppra due to dizziness   Head CT negative for acute process  EEG suggestive of mild generalized encephalopathic process, possibly related to underlying structural brain injury vs metabolic abnormality  Monitor closely   Stop Digoxin today      Anticoagulation for LVAD, INR goal 1.5-2  Goal decreased d/t persistent hematuria   No ASA d/t hematuria  INR 1.8  Coumadin - 4 mg tonight    Epistaxis  Resolved      Acute Respiratory Failure post op  Improved with aggressive diuresis  Off supplemental O2  Pulmonary hygiene   Cont home CPAP- must use while sleeping - will have hospital RT adjust mask/settings to improve patient compliance   Schedule PET CT prior to discharge - ordered for Monday 1/13    Acute on CKD 3, atrophic left kidney  Appreciate Nephrology assistance  Watch Cr, stable  Avoid nephrotoxins, trend labs      CAD s/p CABG   Off ASA d/t hematuria  No BB d/t RV failure  Hold statin due to recent hepatic failure   LHC performed 11/13 - low likelihood of benefit from revascularization      Hx of VF arrest s/p BiV-ICD  No recent shocks  Keep K > 4 and Mg > 2   Mag ox 800 mg po BID  ICD reprogrammed to reduce diaphragmatic stimulation     PAF   Dig level 0.5-cont dig (62.5 mcg qMWFSat)  No BB due to RV failure   Repeat TFTs WNL  Interrogate ICD to eval AF burden      Hx of gout  Uric acid WNL    Acute blood loss anemia  hgb stable  Likely due to hematuria  Cont octreotide 25 mcg SQ TID   Fecal occult 12/14 negative, positive on 12/17  Appreciate urology recommendations  Hematuria improved  Monitor for now     Urinary retention, c/b serratia UTI and hematuria  Appreciate Urology consult   Repeat UA with cx negative 12/15   Cystoscopy performed 11/13 shows catheter induced cystitis   Pseudomonas aeruginosa positive UA culture (12/31/19) - on Cefepmine and levaquin     Malnutrition   Eating better  Prealbumin weekly - 17 on 1/13  Appreciate Nutritionist assistance  Diet as tolerated, encourage food from home, protein shakes  Intolerant of mirtazapine d/t tremors, confusion   Cont MVI add thiamine 100mg daily   Hold on DHT placement for now     COPD   Appreciate Pulmonology assistance   Continue nebs PRN       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 Freddie Peabody, will continue IV abx and wet to dry dressings. Will likely require sternal wire after ~3 months from op date     Vocal cord paralysis  Improved  ENT recs appreciated  Speech therapy following - appreciate input    Anxiety  Change Klonopin to 0.5 mg PO qHS    Depression  Cont Effexor to 150 mg PO qAM   Monitor rhythm strips for prolonged QTc     Debility  Continue PT/OT     Bladder spasms  Received pyridium 100mg x4 doses  Oxybutynin 5mg Q6hr prn      Ppx  Protonix   PT/OT   +daily BM   PICC placed 11/22/19      Dispo:  Remain in CVSU at this time  Will need IP rehab. Not ready for discharge at this time          Patient seen and examined. Data and note reviewed. I have discussed and agree with the plans as noted. 71year old male with a history of LVAD as DT whose post op course has been complicated by RV failure, orthostatic hypotension, pseudomonas bacteremia, and anxiety. Bedside LVAD ramp reveals persistently dilated LV with moderately depressed RV function. Increased LVAD speed to 6700 rpms. He appears intravascularly volume depleted by TTE - will administer IV albumin. Thank you for allowing us to participate in your patient's care. Mounika Martini MD, [facility] [place] [facility], [address] [place] [address]  Chief of Cardiology, [address] [place] [address], [address] [place] [address], [address] [place] [address]  Office [phone]  Fax [phone]          LVAD INTERROGATION:  Device interrogated in person  Device function normal, normal flow, multiple PI events    LVAD   Pump Speed (RPM): 6600  Pump Flow (LPM): 5.9  MAP: 62  PI (Pulsitility Index): 4.1  Power: 5.6   Test: Yes  Back Up  at Bedside & Labeled: Yes  Power Module Test: Yes  Driveline Site Care: Yes  Driveline Dressing: Changed per order  Outpatient: No  MAP in Therapeutic Range (Outpatient): No  Testing  Alarms Reviewed: Yes  Back up SC speed: 6600  Back up Low Speed Limit: 6000  Emergency Equipment with Patient?: Yes  Emergency procedures reviewed?: Yes  Drive line site inspected?: Yes  Drive line intergrity inspected?: Yes  Drive line dressing changed?: No    PHYSICAL EXAM:  Visit Vitals  BP 91/72 (BP 1 Location: Left arm, BP Patient Position: At rest)   Pulse 89   Temp 98.5 °F (36.9 °C)   Resp 20   Ht 6' 2\" (1.88 m)   Wt 181 lb (82.1 kg)   SpO2 97%   BMI 23.24 kg/m²       Physical Exam   Constitutional: He is oriented to person, place, and time. He appears well-developed. He appears cachectic. No distress. HENT:   Head: Normocephalic. Neck: Normal range of motion. Neck supple. No JVD present. Cardiovascular: Normal rate, regular rhythm and normal heart sounds. + VAD hum    Pulmonary/Chest: Effort normal and breath sounds normal. No tachypnea. No respiratory distress. Abdominal: Soft. Bowel sounds are normal. He exhibits no distension. Musculoskeletal: Normal range of motion. General: No edema. Neurological: He is alert and oriented to person, place, and time. Skin: Skin is warm and dry. Psychiatric: He has a normal mood and affect. His speech is normal and behavior is normal.   ~1 cm x 1 cm x 0.25 cm area of delayed closure on sternotomy. Site clean, no erythema or drainage noted. Subcutaneous tissue noted. Nursing note and vitals reviewed. REVIEW OF SYSTEMS:  Review of Systems   Constitutional: Positive for malaise/fatigue. Negative for chills and fever. HENT: Positive for hearing loss. Negative for nosebleeds. Eyes: Negative. Respiratory: Negative for cough and shortness of breath. Mild dyspnea   Cardiovascular: Negative for chest pain, palpitations, orthopnea and leg swelling. Orthostatic   Gastrointestinal: Negative for heartburn, nausea and vomiting. Genitourinary: Negative for dysuria and hematuria. Musculoskeletal: Negative. Neurological: Positive for dizziness and weakness. Negative for headaches.         Mild dizziness - improving    Endo/Heme/Allergies: Does not bruise/bleed easily.        PAST MEDICAL HISTORY:  Past Medical History:   Diagnosis Date    Degenerative disc disease, lumbar     Heart failure (HCC)     High cholesterol     Hypertension     Paroxysmal atrial fibrillation ([facility] [address] 2019    Spinal stenosis        PAST SURGICAL HISTORY:  Past Surgical History:   Procedure Laterality Date    COLONOSCOPY Left 2019    COLONOSCOPY at bedside performed by Heather [facility]an [facility] at [facility] ENDOSCOPY    HX APPENDECTOMY      HX CORONARY ARTERY BYPASS GRAFT      triple    HX HERNIA REPAIR      HX IMPLANTABLE CARDIOVERTER DEFIBRILLATOR      [place] CARDIOVERSION ELECTIVE ARRHYTHMIA EXTERNAL N/A 6/10/2019    EP CARDIOVERSION performed by Magen Cash MD at [address], [facility] [facility] CATH LAB    [place] CARDIOVERSION ELECTIVE ARRHYTHMIA EXTERNAL N/A 2019    [facility] [address] [facility]    [place] INSJ/RPLCMT PERM DFB W/TRNSVNS LDS 1/DUAL CHMBR N/A 2019    INSERT ICD BIV MULTI [facility] [address] CATH [facility]    [place] TCAT Edel Smoke P-ART PRS SNR L-T HEMODYN MNTR N/A 2019    IMPLANT HEART FAILURE MONITORING DEVICE [place] [address] [address] CATH LAB       FAMILY HISTORY:  Family History   Problem Relation Age of Onset    Lung Disease Mother     Hypertension Mother     Arthritis-osteo Mother     Heart Disease Mother     Heart Disease Father     Diabetes Father        SOCIAL HISTORY:  Social History     Socioeconomic History    Marital status:      Spouse name: Not on file    Number of children: Not on file    Years of education: Not on file    Highest education level: Not on file   Tobacco Use    Smoking status: Former Smoker     Last attempt to quit: 2010     Years since quittin.1    Smokeless tobacco: Never Used   Substance and Sexual Activity    Alcohol use: Not Currently     Comment: rarely    Drug use: Never   Other Topics Concern       LABORATORY RESULTS:     Labs Latest Ref Rng & Units 1/13/2020 1/12/2020 1/11/2020 1/10/2020 1/9/2020 1/8/2020 1/7/2020   WBC 4.1 - 11.1 K/uL 4.1 5.2 5.4 4.9 5.7 5.3 6.4   RBC 4.10 - 5.70 M/uL 2.91(L) 2.94(L) 3.08(L) 2.80(L) 3.09(L) 3.15(L) 2.99(L)   Hemoglobin 12.1 - 17.0 g/dL 8.6(L) 8.7(L) 9.0(L) 8.2(L) 9.1(L) 9.2(L) 8.7(L)   Hematocrit 36.6 - 50.3 % 28. 4(L) 28. 7(L) 30. 0(L) 27. 2(L) 29. 3(L) 29. 9(L) 28. 4(L)   MCV 80.0 - 99.0 FL 97.6 97.6 97.4 97.1 94.8 94.9 95.0   Platelets 968 - 096 K/uL 114(L) 114(L) 127(L) 123(L) 138(L) 139(L) 138(L)   Lymphocytes 12 - 49 % - - - - - - -   Monocytes 5 - 13 % - - - - - - -   Eosinophils 0 - 7 % - - - - - - -   Basophils 0 - 1 % - - - - - - -   Albumin 3.5 - 5.0 g/dL 2. 3(L) 2. 3(L) 2. 3(L) 2. 1(L) 2. 4(L) 2. 4(L) 2. 3(L)   Calcium 8.5 - 10.1 MG/DL 8.6 8.8 8.8 7.8(L) 9.1 8.9 8.7   SGOT 15 - 37 U/L 22 18 18 18 21 18 18   Glucose 65 - 100 mg/dL 99 99 99 99 103(H) 108(H) 114(H)   BUN 6 - 20 MG/DL 18 23(H) 23(H) 19 21(H) 23(H) 26(H)   Creatinine 0.70 - 1.30 MG/DL 0.90 0.90 0.96 0.88 1.11 1.08 1.04   Sodium 136 - 145 mmol/L 132(L) 136 136 136 133(L) 133(L) 130(L)   Potassium 3.5 - 5.1 mmol/L 3.5 3.8 3.9 3.9 4.3 4.6 4.8   TSH 0.36 - 3.74 uIU/mL - - - - - - -   LDH 85 - 241 U/L 196 192 206 186 203 198 202   Some recent data might be hidden     Lab Results   Component Value Date/Time    TSH 2.30 12/03/2019 03:29 AM    TSH 2.40 10/25/2019 07:39 PM    TSH 2.45 06/01/2019 04:16 AM       ALLERGY:  No Known Allergies     CURRENT MEDICATIONS:  Current Facility-Administered Medications   Medication Dose Route Frequency    [START ON 1/14/2020] fludrocortisone (FLORINEF) tablet 0.1 mg  0.1 mg Oral DAILY    digoxin (LANOXIN) tablet 0.0625 mg  62.5 mcg Oral Q M, W, F & SAT    warfarin (COUMADIN) tablet 4 mg  4 mg Oral QPM    cholecalciferol (VITAMIN D3) (1000 Units /25 mcg) tablet 2 Tab  2,000 Units Oral DAILY    clonazePAM (KlonoPIN) tablet 0.5 mg  0.5 mg Oral QHS    venlafaxine-SR (EFFEXOR-XR) capsule 150 mg 150 mg Oral DAILY WITH BREAKFAST    midodrine (PROAMITINE) tablet 5 mg  5 mg Oral TID WITH MEALS    hydrocortisone (CORTAID) 1 % cream   Topical TID PRN    sildenafil (pulm.hypertension) (REVATIO) tablet 20 mg  20 mg Oral TID    thiamine HCL (B-1) tablet 100 mg  100 mg Oral DAILY    levoFLOXacin (LEVAQUIN) 750 mg in D5W IVPB  750 mg IntraVENous Q24H    cefepime (MAXIPIME) 2 g in 0.9% sodium chloride (MBP/ADV) 100 mL  2 g IntraVENous Q8H    oxybutynin (DITROPAN) tablet 5 mg  5 mg Oral Q6H PRN    magnesium oxide (MAG-OX) tablet 800 mg  800 mg Oral BID    lidocaine (URO-JET/ GLYDO) 2 % jelly   Urethral PRN    epoetin yvonne-epbx (RETACRIT) injection 20,000 Units  20,000 Units SubCUTAneous Q MON, WED & FRI    albuterol-ipratropium (DUO-NEB) 2.5 MG-0.5 MG/3 ML  3 mL Nebulization Q6H PRN    therapeutic multivitamin (THERAGRAN) tablet 1 Tab  1 Tab Oral DAILY    alteplase (CATHFLO) 1 mg in sterile water (preservative free) 1 mL injection  1 mg InterCATHeter PRN    finasteride (PROSCAR) tablet 5 mg  5 mg Oral DAILY    diphenhydrAMINE (BENADRYL) capsule 25 mg  25 mg Oral QHS PRN    octreotide (SANDOSTATIN) injection 25 mcg  25 mcg SubCUTAneous TID    pantoprazole (PROTONIX) tablet 40 mg  40 mg Oral ACB    allopurinol (ZYLOPRIM) tablet 100 mg  100 mg Oral DAILY    arformoterol (BROVANA) neb solution 15 mcg  15 mcg Nebulization BID RT    And    budesonide (PULMICORT) 500 mcg/2 ml nebulizer suspension  500 mcg Nebulization BID RT    lactobac ac& pc-s.therm-b.anim (TIAN Q/RISAQUAD)  1 Cap Oral DAILY    oxyCODONE IR (ROXICODONE) tablet 5 mg  5 mg Oral Q6H PRN    tamsulosin (FLOMAX) capsule 0.4 mg  0.4 mg Oral DAILY    Warfarin MD/NP dosing   Other PRN    sodium chloride (NS) flush 5-40 mL  5-40 mL IntraVENous Q8H    sodium chloride (NS) flush 5-40 mL  5-40 mL IntraVENous PRN    acetaminophen (TYLENOL) tablet 650 mg  650 mg Oral Q6H PRN    naloxone (NARCAN) injection 0.4 mg  0.4 mg IntraVENous PRN    ondansetron (ZOFRAN) injection 4 mg  4 mg IntraVENous Q4H PRN    sucralfate (CARAFATE) tablet 1 g  1 g Oral AC&HS    influenza vaccine 2019-20 (6 mos+)(PF) (FLUARIX/FLULAVAL/FLUZONE QUAD) injection 0.5 mL  0.5 mL IntraMUSCular PRIOR TO DISCHARGE    hydrALAZINE (APRESOLINE) 20 mg/mL injection 20 mg  20 mg IntraVENous Q6H PRN         Thank you for allowing me to participate in this patient's care.     Urmila Vital NP  [address] [place] [address], Suite [facility]  Phone: [phone]  Fax: [phone]

## 2020-01-13 NOTE — PROGRESS NOTES
Chart reviewed, attempted this morning at 9:00 and patient received in bed with SOB, nausea, and symptomatic orthostatic hypotension. Recommend with nursing patient to continue to complete as able in order to maintain strength, endurance and independence: ADLs with supervision/setup, OOB to chair 3x/day and mobilizing to the bathroom for toileting with 1 assist. Thank you for your assistance.

## 2020-01-13 NOTE — PROGRESS NOTES
[facility]   [address] [facility], [facility] [address], [facility]  Phone: [phone]   [phone]       Nephrology Progress Note  Tammie Vidal     [birth year]     [id number]  Date of Admission : 10/25/2019  01/13/20    CC: Follow up for JUAN J, CKD, Hyponatremia      Assessment and Plan   JUAN J:  - resolved and stable for several days   - consider reducing Florinef as it might be causing fluid overload/ CHF '  - diuresis per AHF team   - signing off and will see him again as needed      Pseudomonal UTI and bacteremia:  - from cultures on 12/29. F/u Cx pending from today   - on cefepime per ID    Orthostatic hypotension w/ syncope:  - on midodrine and florinef    - stim test ok     Hyponatremia :  - Na stable at 136    Gross hematuria, BPH w/ retention:  - off CBI pre VAD. 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   CT in place    L arm clot s/p thrombectomy on 11/25    JOSE on CPAP      PAF s/p DCCV 6/19     Anemia:  - from blood loss s/p multiple blood products + IV iron  - cont PAVEL- lower the dose to weekly  when Hb > 10      Interval History:    SOB yesterday and received Bumex   Good UOP   Continues to have SOB   Na down to 132 today     Review of Systems: pertinent systems reviewed and other wise negative     Current Medications:   Current Facility-Administered Medications   Medication Dose Route Frequency    magnesium sulfate 1 g/100 ml IVPB (premix or compounded)  1 g IntraVENous ONCE    fludrocortisone (FLORINEF) tablet 0.1 mg  0.1 mg Oral BID    digoxin (LANOXIN) tablet 0.0625 mg  62.5 mcg Oral Q M, W, F & SAT    warfarin (COUMADIN) tablet 4 mg  4 mg Oral QPM    cholecalciferol (VITAMIN D3) (1000 Units /25 mcg) tablet 2 Tab  2,000 Units Oral DAILY    clonazePAM (KlonoPIN) tablet 0.5 mg  0.5 mg Oral QHS    venlafaxine-SR (EFFEXOR-XR) capsule 150 mg  150 mg Oral DAILY WITH BREAKFAST    midodrine (PROAMITINE) tablet 5 mg  5 mg Oral TID WITH MEALS    hydrocortisone (CORTAID) 1 % cream   Topical TID PRN    sildenafil (pulm.hypertension) (REVATIO) tablet 20 mg  20 mg Oral TID    thiamine HCL (B-1) tablet 100 mg  100 mg Oral DAILY    levoFLOXacin (LEVAQUIN) 750 mg in D5W IVPB  750 mg IntraVENous Q24H    cefepime (MAXIPIME) 2 g in 0.9% sodium chloride (MBP/ADV) 100 mL  2 g IntraVENous Q8H    oxybutynin (DITROPAN) tablet 5 mg  5 mg Oral Q6H PRN    magnesium oxide (MAG-OX) tablet 800 mg  800 mg Oral BID    lidocaine (URO-JET/ GLYDO) 2 % jelly   Urethral PRN    epoetin yvonne-epbx (RETACRIT) injection 20,000 Units  20,000 Units SubCUTAneous Q MON, WED & FRI    albuterol-ipratropium (DUO-NEB) 2.5 MG-0.5 MG/3 ML  3 mL Nebulization Q6H PRN    therapeutic multivitamin (THERAGRAN) tablet 1 Tab  1 Tab Oral DAILY    alteplase (CATHFLO) 1 mg in sterile water (preservative free) 1 mL injection  1 mg InterCATHeter PRN    finasteride (PROSCAR) tablet 5 mg  5 mg Oral DAILY    diphenhydrAMINE (BENADRYL) capsule 25 mg  25 mg Oral QHS PRN    octreotide (SANDOSTATIN) injection 25 mcg  25 mcg SubCUTAneous TID    pantoprazole (PROTONIX) tablet 40 mg  40 mg Oral ACB    allopurinol (ZYLOPRIM) tablet 100 mg  100 mg Oral DAILY    arformoterol (BROVANA) neb solution 15 mcg  15 mcg Nebulization BID RT    And    budesonide (PULMICORT) 500 mcg/2 ml nebulizer suspension  500 mcg Nebulization BID RT    lactobac ac& pc-s.therm-b.anim (TIAN Q/RISAQUAD)  1 Cap Oral DAILY    oxyCODONE IR (ROXICODONE) tablet 5 mg  5 mg Oral Q6H PRN    tamsulosin (FLOMAX) capsule 0.4 mg  0.4 mg Oral DAILY    Warfarin MD/NP dosing   Other PRN    sodium chloride (NS) flush 5-40 mL 5-40 mL IntraVENous Q8H    sodium chloride (NS) flush 5-40 mL  5-40 mL IntraVENous PRN    acetaminophen (TYLENOL) tablet 650 mg  650 mg Oral Q6H PRN    naloxone (NARCAN) injection 0.4 mg  0.4 mg IntraVENous PRN    ondansetron (ZOFRAN) injection 4 mg  4 mg IntraVENous Q4H PRN    sucralfate (CARAFATE) tablet 1 g  1 g Oral AC&HS    influenza vaccine 2019-20 (6 mos+)(PF) (FLUARIX/FLULAVAL/FLUZONE QUAD) injection 0.5 mL  0.5 mL IntraMUSCular PRIOR TO DISCHARGE    hydrALAZINE (APRESOLINE) 20 mg/mL injection 20 mg  20 mg IntraVENous Q6H PRN      No Known Allergies    Objective:  Vitals:    Vitals:    01/12/20 1733 01/12/20 2003 01/12/20 2332 01/13/20 0344   BP:       Pulse:  98 90 89   Resp:  20 20 18   Temp: 98.7 °F (37.1 °C) 99.5 °F (37.5 °C) 98 °F (36.7 °C) 99.4 °F (37.4 °C)   SpO2:  99% 96% 98%   Weight:    82.1 kg (181 lb)   Height:         Intake and Output:  No intake/output data recorded. 01/11 1901 - 01/13 0700  In: 1150 [P.O.:600; I.V.:550]  Out: 4150 [Urine:4150]    Physical Examination:    General: No distress   Neck:  No lines   Resp:  B/l RALES   CV:  VAD sounds; No LE edema  GI:  Soft and non-tender; no distension  Neurologic:  AAO X 4  :  CONDOM cath     [x]    High complexity decision making was performed  [x]    Patient is at high-risk of decompensation with multiple organ involvement    Lab Data Personally Reviewed: I have reviewed all the pertinent labs, microbiology data and radiology studies during assessment.     Recent Labs     01/13/20  0356 01/12/20  0433 01/11/20  0403   * 136 136   K 3.5 3.8 3.9    105 105   CO2 27 24 25   GLU 99 99 99   BUN 18 23* 23*   CREA 0.90 0.90 0.96   CA 8.6 8.8 8.8   MG 1.7 1.9 2.0   ALB 2.3* 2.3* 2.3*   SGOT 22 18 18   ALT 13 16 16   INR 1.8* 1.8* 1.8*     Recent Labs     01/13/20  0356 01/12/20  0433 01/11/20  0403   WBC 4.1 5.2 5.4   HGB 8.6* 8.7* 9.0*   HCT 28.4* 28.7* 30.0*   * 114* 127*     No results found for: SDES  Lab Results Component Value Date/Time    Culture result: NO GROWTH 3 DAYS 01/08/2020 02:04 PM    Culture result: NO GROWTH 5 DAYS 01/07/2020 11:53 AM    Culture result: LIGHT NORMAL RESPIRATORY TIAN 12/31/2019 04:18 PM    Culture result: (A) 12/31/2019 03:24 PM     PSEUDOMONAS AERUGINOSA ISOLATED FROM 2 OF 4 BOTTLES DRAWN, EACH FROM A DIFFERENT SITE. .. RFA AND LEFT WRIST    Culture result: (A) 12/31/2019 03:24 PM     PRELIMINARY REPORT OF GRAM NEGATIVE RODS GROWING IN 1 OF 4 BOTTLES DRAWN CALLED TO AND READ BACK BY Fransisco Lindsay RN AT 9696 ON 1.1.20 RB    Culture result: (A) 12/31/2019 03:24 PM     PRELIMINARY REPORT OF GRAM NEGATIVE RODS GROWING IN 2ND OF 4 BOTTLES DRAWN (DIFFERENT SITE=L WRIST) CALLED TO AND READ BACK BY Fransisco Lindsay RN AT 15:51 ON 1/1/20 BY [facility]. Culture result: REMAINING BOTTLE(S) HAS/HAVE NO GROWTH IN 5 DAYS 12/31/2019 03:24 PM     Recent Results (from the past 24 hour(s))   METABOLIC PANEL, COMPREHENSIVE    Collection Time: 01/13/20  3:56 AM   Result Value Ref Range    Sodium 132 (L) 136 - 145 mmol/L    Potassium 3.5 3.5 - 5.1 mmol/L    Chloride 100 97 - 108 mmol/L    CO2 27 21 - 32 mmol/L    Anion gap 5 5 - 15 mmol/L    Glucose 99 65 - 100 mg/dL    BUN 18 6 - 20 MG/DL    Creatinine 0.90 0.70 - 1.30 MG/DL    BUN/Creatinine ratio 20 12 - 20      GFR est AA >60 >60 ml/min/1.73m2    GFR est non-AA >60 >60 ml/min/1.73m2    Calcium 8.6 8.5 - 10.1 MG/DL    Bilirubin, total 0.6 0.2 - 1.0 MG/DL    ALT (SGPT) 13 12 - 78 U/L    AST (SGOT) 22 15 - 37 U/L    Alk.  phosphatase 107 45 - 117 U/L    Protein, total 6.4 6.4 - 8.2 g/dL    Albumin 2.3 (L) 3.5 - 5.0 g/dL    Globulin 4.1 (H) 2.0 - 4.0 g/dL    A-G Ratio 0.6 (L) 1.1 - 2.2     MAGNESIUM    Collection Time: 01/13/20  3:56 AM   Result Value Ref Range    Magnesium 1.7 1.6 - 2.4 mg/dL   CBC W/O DIFF    Collection Time: 01/13/20  3:56 AM   Result Value Ref Range    WBC 4.1 4.1 - 11.1 K/uL    RBC 2.91 (L) 4.10 - 5.70 M/uL    HGB 8.6 (L) 12.1 - 17.0 g/dL    HCT 28.4 (L) 36.6 - 50.3 %    MCV 97.6 80.0 - 99.0 FL    MCH 29.6 26.0 - 34.0 PG    MCHC 30.3 30.0 - 36.5 g/dL    RDW 21.1 (H) 11.5 - 14.5 %    PLATELET 981 (L) 538 - 400 K/uL    MPV 10.2 8.9 - 12.9 FL    NRBC 0.0 0  WBC    ABSOLUTE NRBC 0.00 0.00 - 0.01 K/uL   PROTHROMBIN TIME + INR    Collection Time: 01/13/20  3:56 AM   Result Value Ref Range    INR 1.8 (H) 0.9 - 1.1      Prothrombin time 17.7 (H) 9.0 - 11.1 sec   PTT    Collection Time: 01/13/20  3:56 AM   Result Value Ref Range    aPTT 37.6 (H) 22.1 - 32.0 sec    aPTT, therapeutic range     58.0 - 77.0 SECS   DIGOXIN    Collection Time: 01/13/20  3:56 AM   Result Value Ref Range    Digoxin level 0.6 (L) 0.90 - 2.00 NG/ML   LACTIC ACID    Collection Time: 01/13/20  3:56 AM   Result Value Ref Range    Lactic acid 0.9 0.4 - 2.0 MMOL/L   PROCALCITONIN    Collection Time: 01/13/20  3:56 AM   Result Value Ref Range    Procalcitonin 0.13 ng/mL   LD    Collection Time: 01/13/20  3:56 AM   Result Value Ref Range     85 - 241 U/L   NT-PRO BNP    Collection Time: 01/13/20  3:56 AM   Result Value Ref Range    NT pro-BNP 10,017 (H) <125 PG/ML   PREALBUMIN    Collection Time: 01/13/20  3:56 AM   Result Value Ref Range    Prealbumin 17.7 (L) 20.0 - 40.0 mg/dL                 Bailey Branch MD

## 2020-01-13 NOTE — PROGRESS NOTES
[facility] VAD/Heart Failure Progress Note    Admit Date: 10/25/2019  POD:  49 Days Post-Op    Procedure:  Procedure(s):  LEFT BRACHIAL THROMBECTOMY        Subjective:   Dyspnea, fatigue, weakness, nausea; Objective:   Vitals:  Blood pressure 91/72, pulse 89, temperature 98.5 °F (36.9 °C), resp. rate 20, height 6' 2\" (1.88 m), weight 181 lb (82.1 kg), SpO2 97 %.   Temp (24hrs), Av °F (37.2 °C), Min:98 °F (36.7 °C), Max:100.2 °F (37.9 °C)    Hemodynamics:   CO: CO (l/min): 5.8 l/min   CI: CI (l/min/m2): 2.8 l/min/m2   CVP: CVP (mmHg): 4 mmHg (19 1645)   SVR: SVR (dyne*sec)/cm5: 1080 (dyne*sec)/cm5 (19 5169)   PAP Systolic: PAP Systolic: 34 (54/08/81 3283)   PAP Diastolic: PAP Diastolic: 13 (84//58 5674)   PVR:     SV02: SVO2 (%): 67 % (19 1300)   SCV02: SCVO2 (%): 75 % (10/29/19 1900)    VAD Interrogation: LVAD (Heartmate)  Pump Speed (RPM): 6600  Pump Flow (LPM): 5.9  Chatter in Lines: No  PI (Pulsitility Index): 4.1  Power: 5.6  MAP: 79   Test: Yes  Back Up  at Bedside & Labeled: Yes  Power Module Test: Yes  Driveline Site Care: Yes  Driveline Dressing: Changed per order    EKG/Rhythm:      Extubation Date / Time:     CT Output:     Ventilator:  Ventilator Volumes  Vt Set (ml): 550 ml (19)  Vt Exhaled (Machine Breath) (ml): 639 ml (19)  Vt Spont (ml): 437 ml (19 1035)  Ve Observed (l/min): 7.25 l/min (19 1035)    Oxygen Therapy:  Oxygen Therapy  O2 Sat (%): 97 % (20)  Pulse via Oximetry: 92 beats per minute (20)  O2 Device: Nasal cannula (20)  PEEP/CPAP (cm H2O): 2 cm H20 (20)  O2 Flow Rate (L/min): 2 l/min (20)  FIO2 (%): 21 % (20)    CXR:    Admission Weight: Last Weight   Weight: 192 lb 10.9 oz (87.4 kg) Weight: 181 lb (82.1 kg)     Intake / Output / Drain:  Current Shift:  07 -  1900  In: 120 [P.O.:120]  Out: 1100 [Urine:1100]  Last 24 hrs.:     Intake/Output Summary (Last 24 hours) at 1/13/2020 1459  Last data filed at 1/13/2020 1400  Gross per 24 hour   Intake 730 ml   Output 4050 ml   Net -3320 ml             No results for input(s): CPK, CKMB, TROIQ in the last 72 hours.   Recent Labs     01/13/20  0356 01/12/20  0433 01/11/20  0403   * 136 136   K 3.5 3.8 3.9   CO2 27 24 25   BUN 18 23* 23*   CREA 0.90 0.90 0.96   GLU 99 99 99   MG 1.7 1.9 2.0   WBC 4.1 5.2 5.4   HGB 8.6* 8.7* 9.0*   HCT 28.4* 28.7* 30.0*   * 114* 127*     Recent Labs     01/13/20  0356 01/12/20  0433 01/11/20  0403   INR 1.8* 1.8* 1.8*   PTP 17.7* 18.0* 17.7*   APTT 37.6* 34.9* 34.5*     No lab exists for component: PBNP        Current Facility-Administered Medications:     [START ON 1/14/2020] fludrocortisone (FLORINEF) tablet 0.1 mg, 0.1 mg, Oral, DAILY, Levi, Shana B, NP    warfarin (COUMADIN) tablet 4 mg, 4 mg, Oral, QPM, Levi, Shana B, NP, 4 mg at 01/12/20 1720    cholecalciferol (VITAMIN D3) (1000 Units /25 mcg) tablet 2 Tab, 2,000 Units, Oral, DAILY, Polliard, Eulah Opal T, NP, 2 Tab at 01/13/20 0854    clonazePAM (KlonoPIN) tablet 0.5 mg, 0.5 mg, Oral, QHS, Polliard, Rin T, NP, 0.5 mg at 01/12/20 2141    venlafaxine-SR (EFFEXOR-XR) capsule 150 mg, 150 mg, Oral, DAILY WITH BREAKFAST, Polliard, Eulah Opal T, NP, 150 mg at 01/13/20 0854    midodrine (PROAMITINE) tablet 5 mg, 5 mg, Oral, TID WITH MEALS, Polliard, Eulabar Opal T, NP, 5 mg at 01/13/20 1224    hydrocortisone (CORTAID) 1 % cream, , Topical, TID PRN, Mounika Elkins MD    sildenafil (pulm.hypertension) (REVATIO) tablet 20 mg, 20 mg, Oral, TID, Mounika Altman MD, 20 mg at 01/13/20 0855    thiamine HCL (B-1) tablet 100 mg, 100 mg, Oral, DAILY, Jose Miguel ACUNA NP, 100 mg at 01/13/20 0854    levoFLOXacin (LEVAQUIN) 750 mg in D5W IVPB, 750 mg, IntraVENous, Q24H, Juan C Olivares MD, Last Rate: 100 mL/hr at 01/12/20 1507, 750 mg at 01/12/20 1507    cefepime (MAXIPIME) 2 g in 0.9% sodium chloride (MBP/ADV) 100 mL, 2 g, IntraVENous, Q8H, Juan C Olivares MD, Last Rate: 200 mL/hr at 01/13/20 0854, 2 g at 01/13/20 0854    oxybutynin (DITROPAN) tablet 5 mg, 5 mg, Oral, Q6H PRN, Levi, Shana B, NP, 5 mg at 01/01/20 1204    magnesium oxide (MAG-OX) tablet 800 mg, 800 mg, Oral, BID, Cldenice Woods E, NP, 800 mg at 01/13/20 0854    lidocaine (URO-JET/ GLYDO) 2 % jelly, , Urethral, PRN, Mounika Altman MD    epoetin yvonne-epbx (RETACRIT) injection 20,000 Units, 20,000 Units, SubCUTAneous, Q MON, WED & FRI, Sanjiv, Logan NARVAEZ MD, 20,000 Units at 01/10/20 2321    albuterol-ipratropium (DUO-NEB) 2.5 MG-0.5 MG/3 ML, 3 mL, Nebulization, Q6H PRN, Colby Lozano MD, 3 mL at 12/25/19 1407    therapeutic multivitamin (THERAGRAN) tablet 1 Tab, 1 Tab, Oral, DAILY, Levi, Shana B, NP, 1 Tab at 01/13/20 0855    alteplase (CATHFLO) 1 mg in sterile water (preservative free) 1 mL injection, 1 mg, InterCATHeter, PRN, Mounika Altman MD, 1 mg at 01/08/20 0919    finasteride (PROSCAR) tablet 5 mg, 5 mg, Oral, DAILY, Roberto Kang MD, 5 mg at 01/13/20 0854    diphenhydrAMINE (BENADRYL) capsule 25 mg, 25 mg, Oral, QHS PRN, Maddi Herrera November T, NP, 25 mg at 01/05/20 2325    octreotide (SANDOSTATIN) injection 25 mcg, 25 mcg, SubCUTAneous, TID, Sharon Maddie November T, NP, 25 mcg at 01/13/20 1004    pantoprazole (PROTONIX) tablet 40 mg, 40 mg, Oral, ACB, Polliard, Genelle November T, NP, 40 mg at 01/13/20 0730    allopurinol (ZYLOPRIM) tablet 100 mg, 100 mg, Oral, DAILY, Pollkennedyd, Maddi November T, NP, 100 mg at 01/13/20 0855    arformoterol (BROVANA) neb solution 15 mcg, 15 mcg, Nebulization, BID RT, 15 mcg at 01/13/20 0746 **AND** budesonide (PULMICORT) 500 mcg/2 ml nebulizer suspension, 500 mcg, Nebulization, BID RT, Sharon, Genesis Franco NP, 500 mcg at 01/13/20 0746    lactobac ac& pc-s.therm-b.anim (TIAN Q/RISAQUAD), 1 Cap, Oral, DAILY, Nael Gonzalez MD, 1 Cap at 01/13/20 1708   oxyCODONE IR (ROXICODONE) tablet 5 mg, 5 mg, Oral, Q6H PRN, Carina Katz MD, 5 mg at 12/30/19 0405    tamsulosin (FLOMAX) capsule 0.4 mg, 0.4 mg, Oral, DAILY, Leah Matias MD, 0.4 mg at 01/13/20 0855    Warfarin MD/NP dosing, , Other, PRN, Ezio Altman MD    sodium chloride (NS) flush 5-40 mL, 5-40 mL, IntraVENous, Q8H, Mazin PAULINO MD, 30 mL at 01/13/20 1458    sodium chloride (NS) flush 5-40 mL, 5-40 mL, IntraVENous, PRN, Carina Katz MD, 10 mL at 01/02/20 2342    acetaminophen (TYLENOL) tablet 650 mg, 650 mg, Oral, Q6H PRN, Carina Katz MD, 650 mg at 01/12/20 2033    naloxone (NARCAN) injection 0.4 mg, 0.4 mg, IntraVENous, PRN, Carina Katz MD    ondansetron [facility]) injection 4 mg, 4 mg, IntraVENous, Q4H PRN, Carina Katz MD, 4 mg at 01/12/20 1748    sucralfate (CARAFATE) tablet 1 g, 1 g, Oral, AC&HS, Carina Katz MD, 1 g at 01/13/20 1224    influenza vaccine 2019-20 (6 mos+)(PF) (FLUARIX/FLULAVAL/FLUZONE QUAD) injection 0.5 mL, 0.5 mL, IntraMUSCular, PRIOR TO DISCHARGE, Ezio Altman MD    hydrALAZINE (APRESOLINE) 20 mg/mL injection 20 mg, 20 mg, IntraVENous, Q6H PRN, Shana Sims NP, 20 mg at 12/10/19 0541    A/P     S/P LVAD - good flows  Need for anti-coagulation - coumadin  DM - insulin  RV dysfunction - milrinone, diuretics      Risk of morbidity and mortality - high  Medical decision making - high complexity    Signed By: Grazyna Hyman MD

## 2020-01-13 NOTE — PROGRESS NOTES
Transitions of Care Plan:     Patient s/p LVAD implant, LFT brachial thrombectomy     Disposition:  Inpatient rehab - [facility] 21    1055 -  updated by [facility] NP that possible discharge date for transition of care to inpatient rehab is Friday. 1120 - CM called and spoke with Geoff Munoz ([phone]: [phone]) at [facility] rehab. CM provided update that goal is Friday transition from [facility] to [address] [place] [address]. CM will fax updated therapy notes today to [facility] for inpatient rehab.    [phone] - CM faxed updated therapy notes to [address] [place] [address] ([phone]). CM will continue to follow.     Gabby Shea MPH

## 2020-01-13 NOTE — PROGRESS NOTES
1100: driveline dressing change completed, pt bathed with hair and david care. Encouraged pt to get OOB to chair, declines at this time. 1430: PT attempted work with pt, pt declines. 1513: 2 low MAPS of 60 reported to Dr. Zhao Clark. Echo tech and Dr. Zhao Clark at bedside for ramp test, she states she will assess. 1522: vorb albumin started. 1525: IS provided to pt with teachback instructions per Dr. Zhao Clark.  5437: repeat MAP post albumin 78.  1930:Bedside and Verbal shift change report given to connie resendiz rn (oncoming nurse) by kelly ferrara rn (offgoing nurse). Report included the following information SBAR, Kardex, ED Summary, OR Summary, Procedure Summary, Intake/Output, MAR and Recent Results. Problem: Falls - Risk of  Goal: *Absence of Falls  Description  Document Frida Corral Fall Risk and appropriate interventions in the flowsheet.   Outcome: Progressing Towards Goal  Note: Fall Risk Interventions:  Mobility Interventions: Communicate number of staff needed for ambulation/transfer, Assess mobility with egress test    Mentation Interventions: Evaluate medications/consider consulting pharmacy    Medication Interventions: Evaluate medications/consider consulting pharmacy    Elimination Interventions: Call light in reach, Elevated toilet seat, Patient to call for help with toileting needs, Stay With Me (per policy), Toilet paper/wipes in reach, Urinal in reach, Toileting schedule/hourly rounds    History of Falls Interventions: Evaluate medications/consider consulting pharmacy         Problem: Patient Education: Go to Patient Education Activity  Goal: Patient/Family Education  Outcome: Progressing Towards Goal     Problem: Pain  Goal: *Control of Pain  Outcome: Progressing Towards Goal  Goal: *PALLIATIVE CARE:  Alleviation of Pain  Outcome: Progressing Towards Goal     Problem: Pressure Injury - Risk of  Goal: *Prevention of pressure injury  Description  Document Mich Scale and appropriate interventions in the flowsheet.   Outcome: Progressing Towards Goal  Note: Pressure Injury Interventions:  Sensory Interventions: Assess changes in LOC    Moisture Interventions: Absorbent underpads    Activity Interventions: Increase time out of bed, Pressure redistribution bed/mattress(bed type)    Mobility Interventions: HOB 30 degrees or less, Pressure redistribution bed/mattress (bed type)    Nutrition Interventions: Document food/fluid/supplement intake, Discuss nutritional consult with provider, Offer support with meals,snacks and hydration    Friction and Shear Interventions: Lift sheet, HOB 30 degrees or less                Problem: Patient Education: Go to Patient Education Activity  Goal: Patient/Family Education  Outcome: Progressing Towards Goal     Problem: LVAD Post-op Day 15 to Discharge  Goal: Activity/Safety  Outcome: Progressing Towards Goal  Goal: Consults, if ordered  Outcome: Progressing Towards Goal  Goal: Diagnostic Test/Procedures  Outcome: Progressing Towards Goal  Goal: Nutrition/Diet  Outcome: Progressing Towards Goal  Goal: Medications  Outcome: Progressing Towards Goal  Goal: Discharge Planning  Outcome: Progressing Towards Goal  Goal: Respiratory  Outcome: Progressing Towards Goal  Goal: Treatments/Interventions/Procedures  Outcome: Progressing Towards Goal  Goal: Psychosocial  Outcome: Progressing Towards Goal

## 2020-01-13 NOTE — PROGRESS NOTES
Physical Therapy    Reviewed chart and attempted to treat pt. Pt reports not feeling well to day, fever and SOB. Pt is requesting to defer for the day. Acknowledged  pt request and deferred session. Will continue to follow and progress as able.

## 2020-01-14 NOTE — PROGRESS NOTES
[facility] VAD/Heart Failure Progress Note Admit Date: 10/25/2019 POD:  50 Days Post-Op Procedure:  Procedure(s): LEFT BRACHIAL THROMBECTOMY Subjective:  
Mild dyspnea, fatigue, and weakness; up on LVAD speed; CT scan today Objective:  
Vitals: 
Blood pressure 91/72, pulse 97, temperature 98.3 °F (36.8 °C), resp. rate 18, height 6' 2\" (1.88 m), weight 172 lb 6.4 oz (78.2 kg), SpO2 96 %. Temp (24hrs), Av.3 °F (36.8 °C), Min:97.9 °F (36.6 °C), Max:98.9 °F (37.2 °C) Hemodynamics: 
 CO: CO (l/min): 5.8 l/min CI: CI (l/min/m2): 2.8 l/min/m2 CVP: CVP (mmHg): 4 mmHg (19 1645) SVR: SVR (dyne*sec)/cm5: 1080 (dyne*sec)/cm5 (19 1200) PAP Systolic: PAP Systolic: 34 (32/22/57 1151) PAP Diastolic: PAP Diastolic: 13 (63/71/83 0460) PVR:   
 SV02: SVO2 (%): 67 % (19 1300) SCV02: SCVO2 (%): 75 % (10/29/19 1900) VAD Interrogation: LVAD (Heartmate) Pump Speed (RPM): 6700 Pump Flow (LPM): 6 Chatter in Lines: No 
PI (Pulsitility Index): 2.8 Power: 6 MAP: 80  Test: Yes 
Back Up  at Bedside & Labeled: Yes Power Module Test: Yes Driveline Site Care: No 
Driveline Dressing: Clean, Dry, and Intact EKG/Rhythm:   
 
Extubation Date / Time:  
 
CT Output:  
 
Ventilator: 
Ventilator Volumes Vt Set (ml): 550 ml (19 08) Vt Exhaled (Machine Breath) (ml): 639 ml (19 0826) Vt Spont (ml): 437 ml (19 1035) Ve Observed (l/min): 7.25 l/min (19 1035) Oxygen Therapy: 
Oxygen Therapy O2 Sat (%): 96 % (20) Pulse via Oximetry: 89 beats per minute (20) O2 Device: Nasal cannula (20) PEEP/CPAP (cm H2O): 2 cm H20 (20) O2 Flow Rate (L/min): 2 l/min (20) FIO2 (%): 21 % (20 09) CXR: 
 
Admission Weight: Last Weight Weight: 192 lb 10.9 oz (87.4 kg) Weight: 172 lb 6.4 oz (78.2 kg) Intake / Sadie Goodwill / Drain: Current Shift: 01/14 0701 - 01/14 1900 In: 4215 [P.O.:840; I.V.:200] Out: 400 [Urine:400] Last 24 hrs.:  
 
Intake/Output Summary (Last 24 hours) at 1/14/2020 1141 Last data filed at 1/14/2020 1116 Gross per 24 hour Intake 2740 ml Output 1700 ml Net 1040 ml No results for input(s): CPK, CKMB, TROIQ in the last 72 hours. Recent Labs  
  01/14/20 
6720 01/14/20 
0448 01/13/20 
0356 01/12/20 
2574 NA  --  130* 132* 136 K  --  3.2* 3.5 3.8 CO2  --  27 27 24 BUN  --  22* 18 23* CREA  --  1.07 0.90 0.90 GLU  --  89 99 99 MG  --  2.0 1.7 1.9 WBC 2.8* 2.5* 4.1 5.2 HGB 9.1* 9.2* 8.6* 8.7* HCT 29.4* 29.6* 28.4* 28.7*  
PLT 94* 93* 114* 114* Recent Labs  
  01/14/20 
0448 01/13/20 
0356 01/12/20 
3941 INR 1.7* 1.8* 1.8* PTP 17.2* 17.7* 18.0* APTT 41.8* 37.6* 34.9* No lab exists for component: PBNP Current Facility-Administered Medications:  
  senna-docusate (PERICOLACE) 8.6-50 mg per tablet 1 Tab, 1 Tab, Oral, PRN, Mounika Altman MD 
  sodium chloride 0.9 % bolus infusion 100 mL, 100 mL, IntraVENous, RAD ONCE, David Doll MD 
  iopamidol (ISOVUE-370) 76 % injection 100 mL, 100 mL, IntraVENous, RAD ONCE, David Doll MD 
  sodium chloride (NS) flush 10 mL, 10 mL, IntraVENous, RAD ONCE, David Doll MD 
  fludrocortisone (FLORINEF) tablet 0.1 mg, 0.1 mg, Oral, DAILY, Shana Sims NP, 0.1 mg at 01/14/20 4152   warfarin (COUMADIN) tablet 4 mg, 4 mg, Oral, QPM, Shana Sims NP, 4 mg at 01/13/20 1652   cholecalciferol (VITAMIN D3) (1000 Units /25 mcg) tablet 2 Tab, 2,000 Units, Oral, DAILY, Genesis Herrera NP, 2 Tab at 01/14/20 9656   clonazePAM (KlonoPIN) tablet 0.5 mg, 0.5 mg, Oral, QHS, Maddi Herrera, TIERRA, 0.5 mg at 01/13/20 8383   venlafaxine-SR (EFFEXOR-XR) capsule 150 mg, 150 mg, Oral, DAILY WITH BREAKFAST, Maddi Herrera NP, 150 mg at 01/14/20 9461   midodrine (PROAMITINE) tablet 5 mg, 5 mg, Oral, TID WITH MEALS, Sharon, Angelo LOPEZ NP, 5 mg at 01/14/20 1683   hydrocortisone (CORTAID) 1 % cream, , Topical, TID PRN, Mounika Altman MD 
  sildenafil (pulm.hypertension) (REVATIO) tablet 20 mg, 20 mg, Oral, TID, Mounika Altman MD, 20 mg at 01/14/20 8782   thiamine HCL (B-1) tablet 100 mg, 100 mg, Oral, DAILY, Lorriane Floyd, NP, 100 mg at 01/14/20 1165   levoFLOXacin (LEVAQUIN) 750 mg in D5W IVPB, 750 mg, IntraVENous, Q24H, Juan C Olivares MD, Last Rate: 100 mL/hr at 01/13/20 1501, 750 mg at 01/13/20 1501   cefepime (MAXIPIME) 2 g in 0.9% sodium chloride (MBP/ADV) 100 mL, 2 g, IntraVENous, Q8H, Juan C Olivares MD, Last Rate: 200 mL/hr at 01/14/20 0728, 2 g at 01/14/20 0129   oxybutynin (DITROPAN) tablet 5 mg, 5 mg, Oral, Q6H PRN, Shana Sims NP, 5 mg at 01/01/20 1204   magnesium oxide (MAG-OX) tablet 800 mg, 800 mg, Oral, BID, Lorriane Floyd, NP, 800 mg at 01/14/20 8754   lidocaine (URO-JET/ GLYDO) 2 % jelly, , Urethral, PRN, Mounika Altman MD 
  epoetin yvonne-epbx (RETACRIT) injection 20,000 Units, 20,000 Units, SubCUTAneous, Q MON, WED & FRI, Rudy Carmichael MD, 20,000 Units at 01/13/20 2108   albuterol-ipratropium (DUO-NEB) 2.5 MG-0.5 MG/3 ML, 3 mL, Nebulization, Q6H PRN, Sachi Vincent MD, 3 mL at 12/25/19 1407   therapeutic multivitamin (THERAGRAN) tablet 1 Tab, 1 Tab, Oral, DAILY, Shana Sims NP, 1 Tab at 01/14/20 9573   alteplase (CATHFLO) 1 mg in sterile water (preservative free) 1 mL injection, 1 mg, InterCATHeter, PRN, Mounika Altman MD, 1 mg at 01/08/20 0919 
  finasteride (PROSCAR) tablet 5 mg, 5 mg, Oral, DAILY, Narda Marks MD, 5 mg at 01/14/20 1230   diphenhydrAMINE (BENADRYL) capsule 25 mg, 25 mg, Oral, QHS PRN, Angelo Herrera NP, 25 mg at 01/05/20 2325   octreotide (SANDOSTATIN) injection 25 mcg, 25 mcg, SubCUTAneous, TID, Jacque Daley, TIERRA, 25 mcg at 01/14/20 0846 
  pantoprazole (PROTONIX) tablet 40 mg, 40 mg, Oral, ACB, Polliard, Fabricio Familia T, NP, 40 mg at 01/14/20 5673   allopurinol (ZYLOPRIM) tablet 100 mg, 100 mg, Oral, DAILY, Polliard, Fabricio Jovanye T, NP, 100 mg at 01/14/20 0843 
  arformoterol (BROVANA) neb solution 15 mcg, 15 mcg, Nebulization, BID RT, 15 mcg at 01/14/20 1043 **AND** budesonide (PULMICORT) 500 mcg/2 ml nebulizer suspension, 500 mcg, Nebulization, BID RT, Polliard, Fabricio LOPEZ, NP, 500 mcg at 01/14/20 1043   lactobac ac& pc-s.therm-b.anim (TIAN Q/RISAQUAD), 1 Cap, Oral, DAILY, Carmelo Velásquez MD, 1 Cap at 01/14/20 3638   oxyCODONE IR (ROXICODONE) tablet 5 mg, 5 mg, Oral, Q6H PRN, Freddie Javier MD, 5 mg at 12/30/19 0405   tamsulosin (FLOMAX) capsule 0.4 mg, 0.4 mg, Oral, DAILY, Logan Kincaid MD, 0.4 mg at 01/14/20 8636   Warfarin MD/NP dosing, , Other, PRN, Mounika Altman MD 
  sodium chloride (NS) flush 5-40 mL, 5-40 mL, IntraVENous, Q8H, Suresh PAULINO MD, 40 mL at 01/14/20 0737 
  sodium chloride (NS) flush 5-40 mL, 5-40 mL, IntraVENous, PRN, Freddie Javier MD, 10 mL at 01/02/20 2342   acetaminophen (TYLENOL) tablet 650 mg, 650 mg, Oral, Q6H PRN, Freddie Javier MD, 650 mg at 01/12/20 2033 
  naloxone [facility]) injection 0.4 mg, 0.4 mg, IntraVENous, PRN, Freddie Javier MD 
  ondansetron [facility]) injection 4 mg, 4 mg, IntraVENous, Q4H PRN, Freddie Javier MD, 4 mg at 01/12/20 8659   sucralfate (CARAFATE) tablet 1 g, 1 g, Oral, AC&HS, Freddie Javier MD, 1 g at 01/14/20 2076   influenza vaccine 2019-20 (6 mos+)(PF) (FLUARIX/FLULAVAL/FLUZONE QUAD) injection 0.5 mL, 0.5 mL, IntraMUSCular, PRIOR TO DISCHARGE, Mounika Altman MD 
  hydrALAZINE (APRESOLINE) 20 mg/mL injection 20 mg, 20 mg, IntraVENous, Q6H PRN, Shana Sims NP, 20 mg at 12/10/19 0541 A/P 
  
S/P LVAD - good flows Need for anti-coagulation - coumadin DM - insulin RV dysfunction - milrinone, diuretics  
  
Risk of morbidity and mortality - high Medical decision making - high complexity Signed By: Jason Torrez MD

## 2020-01-14 NOTE — WOUND CARE
Wound Care Note: Follow-up visit for sternal wound. Chart shows: 
Admitted for acute decompensated heart failure s/p right axillary exploration with construction of 10 mm chimney graft used for introduction of a percutaneous left ventricular assist device with insertion of same 10/30/19 s/p cystoscopy 11/13/19 and left brachial artery thrombectomy 11/25/19 Past Medical History:  
Diagnosis Date  Degenerative disc disease, lumbar  Heart failure ([facility] [address]  High cholesterol  Hypertension  Paroxysmal atrial fibrillation ([facility] [address] 4/2/2019  Spinal stenosis   
' Wound Culture obtained on 1/8/20 with results no WBC's seen, no organisms seen, no growth in 3 days. WBC = 2.8 on 1/14/20 Assessment:  
Patient is A&O x 4, communicative, continent with some assistance needed in repositioning. Bed: Total Care Sport Diet: Regular with nutritional supplements and snacks Patient reports no pain. Bilateral heels, buttocks, and sacral skin intact and without erythema. 1. Distal sternal wound is measuring smaller, 1.2 cm x 1.3 cm x 0.3 cm, wound bed still covered with slough, small serous drainage, david-wound intact, wound edges are open. Will switch to Santyl to debride wound bed. Iodosorb applied today. Spoke with Billie Matamoros NP; wound care orders obtained. Patient repositioned on left side. Heels offloaded on pillows. Recommendations:   
Change to Santyl ointment Sternal wound-  Daily cleanse with normal saline, wipe wound bed clean and dry, apply nickel thickness of Santyl ointment, apply small piece of gauze that has been moistened with normal saline, cover with gauze. Skin Care & Pressure Prevention: 
Minimize layers of linen/pads under patient to optimize support surface. Turn/reposition approximately every 2 hours and offload heels. Manage incontinence / promote continence Nourishing Skin Cream to dry skin Discussed above plan with patient & Skyler Pina RN Transition of Care: Plan to follow as needed while admitted to hospital. 
 
Elspeth Pancake" Sandie Ganser, BSN, RN, [facility], [facility]. 
office [phone] 
pager [phone] or call  to page

## 2020-01-14 NOTE — PROGRESS NOTES
0730:  Bedside shift change report given to Nely Gauthier RN (oncoming nurse) by Darrel Souza RN (offgoing nurse). Report included the following information SBAR, Kardex, Procedure Summary, Intake/Output, MAR, and Recent Results. 0800:  Patient reports feeling tired and dizzy, MAP 80.    0830:  Patient states he feels better, ate good breakfast.    1130:  Patient attempted to work with PT, became orthostatic and symptomatic when sitting up.    1200:  Patient feels better after laying flat. Wound Care at bedside. 1300:  Patient transported to CT, on monitor, with RN, and with backup equipment. 1415:  Patient working with OT.    1500:  Patient's MAP 62, just finished working with OT, states he feels \"ok\" and that his dizziness is less when his head is turned all the way to the left or right. 1524:  Spoke with Rebecca Loja, NP regarding patient's MAP and symptoms, received orders for 250 mL bolus. 1642: MAP 78 post bolus and scheduled midodrine. 1930:  Bedside shift change report given to Darrel Souza RN (oncoming nurse) by Nely Gauthier RN (offgoing nurse). Report included the following information SBAR, Kardex, Procedure Summary, Intake/Output, MAR and Recent Results. Problem: Pain  Goal: *Control of Pain  Outcome: Progressing Towards Goal     Problem: Pressure Injury - Risk of  Goal: *Prevention of pressure injury  Description  Document Mich Scale and appropriate interventions in the flowsheet.   Outcome: Progressing Towards Goal  Note: Pressure Injury Interventions:  Sensory Interventions: Assess changes in LOC    Moisture Interventions: Apply protective barrier, creams and emollients, Internal/External urinary devices    Activity Interventions: Increase time out of bed, Pressure redistribution bed/mattress(bed type)    Mobility Interventions: HOB 30 degrees or less, Pressure redistribution bed/mattress (bed type)    Nutrition Interventions: Document food/fluid/supplement intake, Discuss nutritional consult with provider, Offer support with meals,snacks and hydration    Friction and Shear Interventions: Lift sheet, HOB 30 degrees or less                Problem: Heart Failure: Discharge Outcomes  Goal: *Demonstrates ability to perform prescribed activity without shortness of breath or discomfort  Outcome: Progressing Towards Goal     Problem: LVAD Post-op Day 15 to Discharge  Goal: Activity/Safety  Outcome: Progressing Towards Goal

## 2020-01-14 NOTE — PROGRESS NOTES
Problem: Self Care Deficits Care Plan (Adult) Goal: *Acute Goals and Plan of Care (Insert Text) Description FUNCTIONAL STATUS PRIOR TO ADMISSION: Patient was modified independent using a single point cane for functional mobility. Patient able to shower and dress himself. However, patient required assistance for household management from his wife. HOME SUPPORT: The patient lived alone with wife to provide assistance. Occupational Therapy Goals all updated 1/10/2020 1. Patient will perform upper body dressing including management of LVAD with min A within 7 day(s) 2. Patient will perform lower body dressing with RW CGA within 7 day(s). 3.  Patient will perform grooming standing with RW 2 mins with supervision/setup within 7 day(s). 4.  Patient will perform toilet transfers with RW minimum assistance within 7 day(s). 5.  Patient will perform all aspects of toileting with RW with moderate assistance within 7 day(s). 6.  Patient will perform gathering ADL items high and waist height 2/2 with RW supervision within 7 days. Continue all goals 10/30/19 post re-eval for Impella removal  
Initiated 10/28/2019 1. Patient will perform bathing with supervision/set-up within 7 day(s). 2.  Patient will perform lower body dressing with supervision/set-up within 7 day(s). 3.  Patient will perform grooming with modified independence within 7 day(s). 4.  Patient will perform toilet transfers with modified independence within 7 day(s). 5.  Patient will perform all aspects of toileting with supervision/set-up within 7 day(s). 6.  Patient will participate in upper extremity therapeutic exercise/activities with supervision/set-up for 5 minutes within 7 day(s). 7.  Patient will utilize energy conservation techniques during functional activities with verbal cues within 7 day(s). Outcome: Progressing Towards Goal 
 OCCUPATIONAL THERAPY TREATMENT Patient: Eliz Benavidez (75 y.o. male) Date: 1/14/2020 Diagnosis: Acute decompensated heart failure ([facility] [address] [I50.9] <principal problem not specified> Procedure(s) (LRB): LEFT BRACHIAL THROMBECTOMY (Left) 50 Days Post-Op Precautions: Fall Chart, occupational therapy assessment, plan of care, and goals were reviewed. ASSESSMENT Patient continues with skilled OT services and is progressing towards goals. However, patient continues to be limited by symptomatic orthostatic hypotension at this time (MAP 68 with bed in modified chair position; c/o dizziness and diaphoretic when seated EOB-unable to obtain MAP). Patient able to tolerate 3 minutes seated EOB in preparation for seated grooming tasks however then returning to supine due to inability to tolerate sitting. Patient then completed 4/6 BUE cardiac exercises in bed. Patient able to verbalize LVAD terminology to include \", batteries, and battery clips\" however continues to require verbal cues for drive-line and power leads. Continue to recommend IPR when patient medically stable for discharge Current Level of Function Impacting Discharge (ADLs): MOD A UB dressing 2* LVAD management; MOD A LB ADLs Other factors to consider for discharge: LVAD HM III 
    
 
PLAN : 
Patient continues to benefit from skilled intervention to address the above impairments. Continue treatment per established plan of care. to address goals. Recommend with staff: OOB x 3 to chair if medically stable; BUE cardiac exercises Recommend next OT session: standing ADL Recommendation for discharge: (in order for the patient to meet his/her long term goals) Therapy 3 hours per day 5-7 days per week This discharge recommendation: 
Has been made in collaboration with the attending provider and/or case management IF patient discharges home will need the following DME: TBD SUBJECTIVE:  
Patient stated I feel better than yesterday.  OBJECTIVE DATA SUMMARY:  
Cognitive/Behavioral Status: 
Neurologic State: Alert Orientation Level: Oriented X4 Cognition: Appropriate for age attention/concentration Perception: Appears intact Perseveration: No perseveration noted Safety/Judgement: Decreased insight into deficits; Decreased awareness of need for safety;Decreased awareness of need for assistance Functional Mobility and Transfers for ADLs: 
Bed Mobility: 
Supine to Sit: Moderate assistance Sit to Supine: Moderate assistance Scooting: Minimum assistance Balance: 
Sitting: Impaired; Without support Sitting - Static: Good (unsupported) Sitting - Dynamic: Fair (occasional) Standing: (did not attempt) ADL Intervention: 
  
 
  
Patient recalled VAD equipment in prep for ADL routine with MAX A-MODA assist:  
Item Correct Identification Location Function Comments  x Emergency Bag x Battery Clips x Display Module Battery Charger Power Module Battery  x [address] [facility] [employer] Power Leads Battery Holster Mojixter Vest x Cognitive Retraining Safety/Judgement: Decreased insight into deficits; Decreased awareness of need for safety;Decreased awareness of need for assistance Therapeutic Exercises:  
Patient instructed on the benefits and demonstrated 4/6 cardiac exercises while seated in bed with Stand-by assistance. Instructed and indicated understanding on how to progress reps, sets against gravity, working up to 5 lbs, standing and so on based on surgeon clearance for more weight in prep for basic and instrumental ADLs. Instruction on the use of household items in place of weights as needed. CARDIAC EXERCISE Sets Reps Active  Active Assist   
Passive  Self ROM Comments Shoulder flexion  1  5   [x]                            []                             []                             []                               
 Shoulder abduction  1  5  [x]                             []                             []                             []                               
Scapular elevation  1  5  [x]                             []                              []                             []                               
Scapular retraction  1  5  [x]                             []                             []                             []                               
Trunk rotation      []                             []                               
Trunk sidebending      []                             []                                     
  
 
Activity Tolerance:  
Fair and signs and symptoms of orthostatic hypotension Please refer to the flowsheet for vital signs taken during this treatment. After treatment patient left in no apparent distress:  
Supine in bed, Call bell within reach, and Caregiver / family present COMMUNICATION/COLLABORATION:  
The patients plan of care was discussed with: Physical Therapist and Registered Nurse Girish Cho Time Calculation: 38 mins

## 2020-01-14 NOTE — PROGRESS NOTES
[address] [facility] [address] [facility] in [place], [place] Inpatient Progress Note Patient name: Corby David Patient : 1950 Patient MRN: [id number] Attending MD: Devin Eller MD 
Date of service: 20 Chief Complaint:  
Juan Luis Folds azucena LVAD implant 
  
HPI: Sona Kiser is a 71y.o. year old pleasant white male with a history of HTN, HLD, JOSE, CAD s/p cardiac arrest VFib s/p CABG () c/b sternal would infection and sternectomy, ischemic cardiomyopathy LVEF 15-20%, s/p ICD and with LBBB. He was admitted with acute on chronic systolic heart failure with massive volume overload > 20 lbs, in the setting of atrial fibrillation s/p failed DCCV and underwent DCCV and RHC on .  S/p BiVICD on 19 with Dr. Pennie Noriega. He was discharged home home on IV milrinone on 19. Mathew Boast has been followed closely by Dr. Jhonatan Meyers and the [facility]. 
  
Mr. Kiser was admitted for acute on chronic systolic heart failure. He underwent implantation of Impella 5.0 due to CS and has completed his LVAD evaluation. Mathew Boast meets criteria for implant of HM3 as DT. He was NYHA Class IV prior to implant of Impella 5.0, has LVEF < 15%, was intolerant of GDMT due to symptomatic hypotension and renal dysfunction. He remains dependent on temporary MCS support. RHC  revealed compensated hemodynamics on Impella. His renal function has improved dramatically with improvement in his hemodynamics. He is not a suitable transplant candidate due to single kidney, sternectomy, debility, and frailty. He was reviewed by Aneta Nicole and felt to be a high risk heart transplant candidate due to multiple co-morbidities as well as the afore mentioned conditions.  He remained in the CCU and underwent a HM 3 implant as DT on . He was weaned off of pressors and transferred to [place] [address] where he continues to undergo PT/OT and hemodynamic optimization.   
  
24Hr Events:  
Less SOB Nausea improved Used CPAP overnight States he feels better overall Procedure:  Procedure(s): REMOVAL TEMPORARY LEFT VENTRICULAR ASSIST DEVICE, IMPLANTATION OF LEFT VENTRICULAR ASSIST DEVICE PERMANENT (VAD), ECC, JACQUE, EPI AORTIC US BY DR Rylie Boyer.    
POD:  57 
  
Impression / Plan:  
Heart Failure Status: NYHA Class IV, improved to NYHA Class III Chronic systolic heart failure due to ICM, NYHA Class IV, EF < 15%, cardiogenic shock bridged with Impella 5.0 support to HM 3 implant S/p Impella removal and LVAD implant 11/18/19 RPMs stable at 6700 Multiple PI events on interrogation - improved Continue Sildenafil 20 mg PO TID Intolerant of BB due to RV failure Intolerant of ACEi/ARB/ARNI/AA due to JUAN J on CKD 3 Intolerant of spironolactone d/t IVVD Continue midodrine 5 mg po TID for orthostatic hypotension No diuretics today Strict I/O, daily weights Continue LVAD education Dressing change frequency three times weekly, sutures removed 12/26/19 Ramp test yesterday, set speed increased to 6700 Chest CTA today to eval for outflow pump thrombus Bacteremia - Pseudomonas Blood cultures (12/31/19) 2/4 bottles +Pseudomonas & 2/4 bottles GNRs Repeat BC NGTD On IV Cefepime and Levaquin Follow procalcitonin and lactic acid levels - both improving Repeat sputum Repeat UA negative Orthostatic Hypotension Midodrine 5 mg PO TID Cont Florinef 0.1 mg to daily Stim test unremarkable Generalized myoclonus Improved Appreciate Neurology input Discontinued Keppra due to dizziness Head CT negative for acute process EEG suggestive of mild generalized encephalopathic process, possibly related to underlying structural brain injury vs metabolic abnormality Monitor closely Stop Digoxin today  
  
Anticoagulation for LVAD, INR goal 1.5-2 Goal decreased d/t persistent hematuria No ASA d/t hematuria INR 1.7 Coumadin - 4 mg tonight Epistaxis Resolved  
  
Acute Respiratory Failure post op Improved with aggressive diuresis Off supplemental O2 Pulmonary hygiene Cont home CPAP- must use while sleeping - will have hospital RT adjust mask/settings to improve patient compliance Acute on CKD 3, atrophic left kidney Appreciate Nephrology assistance Watch Cr, stable Avoid nephrotoxins, trend labs  
  
CAD s/p CABG Off ASA d/t hematuria No BB d/t RV failure Hold statin due to recent hepatic failure LHC performed 11/13 - low likelihood of benefit from revascularization  
  
Hx of VF arrest s/p BiV-ICD No recent shocks Keep K > 4 and Mg > 2  
Mag ox 800 mg po BID ICD reprogrammed to reduce diaphragmatic stimulation PAF Dig level 0.5-cont dig (62.5 mcg qMWFSat) No BB due to RV failure Repeat TFTs WNL Interrogate ICD to eval AF burden  
  
Hx of gout Uric acid WNL Acute blood loss anemia 
hgb stable Likely due to hematuria Cont octreotide 25 mcg SQ TID Fecal occult 12/14 negative, positive on 12/17 Appreciate urology recommendations Hematuria improved Monitor for now 
  
Urinary retention, c/b serratia UTI and hematuria Appreciate Urology consult Repeat UA with cx negative 12/15 Cystoscopy performed 11/13 shows catheter induced cystitis Pseudomonas aeruginosa positive UA culture (12/31/19) - on Cefepmine and levaquin Malnutrition Eating better Prealbumin weekly - 17 on 1/13 Appreciate Nutritionist assistance Diet as tolerated, encourage food from home, protein shakes Intolerant of mirtazapine d/t tremors, confusion Cont MVI add thiamine 100mg daily Hold on DHT placement for now COPD Appreciate Pulmonology assistance Continue nebs PRN   
  
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  
  
Hx of sternal wound infection, sternectomy Sternum closed post op- monitor Delayed skin healing mid portion of sternotomy - evaluate by Dr. Nancy Chin, will continue IV abx and wet to dry dressings. Will likely require sternal wire after ~3 months from op date Vocal cord paralysis Improved ENT recs appreciated Speech therapy following - appreciate input Anxiety Change Klonopin to 0.5 mg PO qHS Depression Cont Effexor to 150 mg PO qAM  
Monitor rhythm strips for prolonged QTc 
  
Debility Continue PT/OT Bladder spasms Received pyridium 100mg x4 doses Oxybutynin 5mg Q6hr prn  
  
Ppx Protonix PT/OT  
+daily BM  
PICC placed 11/22/19  
  
Dispo: 
Remain in [facility] at this time Will need IP rehab. Not ready for discharge at this time Patient seen and examined. Data and note reviewed. I have discussed and agree with the plans as noted. 71year old male with a history of ICM s/p LVAD as DT whose post op course has been complicated by RV failure, pseudomonas bacteremia, and orthostatic hypotension. He used CPAP last evening and has been working hard on IS. Encourage ambulation with PT today. Thank you for allowing us to participate in your patient's care. Mounika Jones MD, Mellissa Schlatter Chief of Cardiology, [facility] Medical Director Calos Rueda [address] [place] [address], [address] [facility], [facility] Office [phone] Fax [phone] LVAD INTERROGATION: 
Device interrogated in person Device function normal, normal flow, multiple PI events LVAD Pump Speed (RPM): 6700 Pump Flow (LPM): 6 MAP: 84 
PI (Pulsitility Index): 2.8 Power: 6  Test: Yes 
Back Up  at Bedside & Labeled: Yes Power Module Test: Yes Driveline Site Care: No 
Driveline Dressing: Clean, Dry, and Intact Outpatient: No 
MAP in Therapeutic Range (Outpatient): Yes Testing Alarms Reviewed: Yes 
Back up SC speed: 6700 Back up Low Speed Limit: 6000 Emergency Equipment with Patient?: Yes Emergency procedures reviewed?: Yes Drive line site inspected?: Yes 
 Drive line intergrity inspected?: Yes Drive line dressing changed?: No 
 
PHYSICAL EXAM: 
Visit Vitals BP 91/72 (BP 1 Location: Left arm, BP Patient Position: At rest) Pulse 97 Temp 98.3 °F (36.8 °C) Resp 18 Ht 6' 2\" (1.88 m) Wt 172 lb 6.4 oz (78.2 kg) SpO2 96% BMI 22.13 kg/m² Physical Exam  
Constitutional: He is oriented to person, place, and time. He appears well-developed. He appears cachectic. No distress. HENT:  
Head: Normocephalic. Neck: Normal range of motion. Neck supple. No JVD present. Cardiovascular: Normal rate, regular rhythm and normal heart sounds. + VAD hum Pulmonary/Chest: Effort normal and breath sounds normal. No tachypnea. No respiratory distress. Abdominal: Soft. Bowel sounds are normal. He exhibits no distension. Musculoskeletal: Normal range of motion. General: No edema. Neurological: He is alert and oriented to person, place, and time. Skin: Skin is warm and dry. Psychiatric: He has a normal mood and affect. His speech is normal and behavior is normal.  
~1 cm x 1 cm x 0.25 cm area of delayed closure on sternotomy. Site clean, no erythema or drainage noted. Subcutaneous tissue noted. Nursing note and vitals reviewed. REVIEW OF SYSTEMS: 
Review of Systems Constitutional: Positive for malaise/fatigue. Negative for chills and fever. HENT: Positive for hearing loss. Negative for nosebleeds. Eyes: Negative. Respiratory: Negative for cough and shortness of breath. Mild dyspnea Cardiovascular: Negative for chest pain, palpitations, orthopnea and leg swelling. Orthostatic Gastrointestinal: Negative for heartburn, nausea and vomiting. Genitourinary: Negative for dysuria and hematuria. Musculoskeletal: Negative. Neurological: Positive for dizziness and weakness. Negative for headaches. Mild dizziness - improving Endo/Heme/Allergies: Does not bruise/bleed easily.   
 
 
PAST MEDICAL HISTORY: 
 Past Medical History:  
Diagnosis Date  Degenerative disc disease, lumbar  Heart failure ([facility]  High cholesterol  Hypertension  Paroxysmal atrial fibrillation ([facility] 2019  Spinal stenosis PAST SURGICAL HISTORY: 
Past Surgical History:  
Procedure Laterality Date  COLONOSCOPY Left 2019 COLONOSCOPY at bedside performed by Catalina Guerrero MD at [facility]  HX CORONARY ARTERY BYPASS GRAFT    
 triple  HX HERNIA REPAIR    
 HX IMPLANTABLE CARDIOVERTER DEFIBRILLATOR  SD CARDIOVERSION ELECTIVE ARRHYTHMIA EXTERNAL N/A 6/10/2019 EP CARDIOVERSION performed by Eveline Michaels MD at [address] CATH LAB  SD CARDIOVERSION ELECTIVE ARRHYTHMIA EXTERNAL N/A 2019 EP [facility] [address] [facility]  SD INSJ/RPLCMT PERM DFB W/TRNSVNS LDS 1/DUAL CHMBR N/A 2019 INSERT ICD BIV MULTI [facility] [address] C[facility]  SD TCAT IMPL WRLS P-ART PRS SNR L-T HEMODYN MNTR N/A 2019 IMPLANT HEART FAILURE MONITORING DEVICE performed by Skylar Dejesus MD at [address] CATH LAB FAMILY HISTORY: 
Family History Problem Relation Age of Onset  Lung Disease Mother  Hypertension Mother [facility] Arthritis-osteo Mother  Heart Disease Mother  Heart Disease Father  Diabetes Father SOCIAL HISTORY: 
Social History Socioeconomic History  Marital status:  Spouse name: Not on file  Number of children: Not on file  Years of education: Not on file  Highest education level: Not on file Tobacco Use  Smoking status: Former Smoker Last attempt to quit: 2010 Years since quittin.1  Smokeless tobacco: Never Used Substance and Sexual Activity  Alcohol use: Not Currently Comment: rarely  Drug use: Never Other Topics Concern LABORATORY RESULTS: 
  
 Labs Latest Ref Rng & Units 1/14/2020 1/14/2020 1/13/2020 1/12/2020 1/11/2020 1/10/2020 1/9/2020 WBC 4.1 - 11.1 K/uL 2. 8(L) 2. 5(L) 4.1 5.2 5.4 4.9 5.7  
RBC 4.10 - 5.70 M/uL 3.05(L) 3.08(L) 2.91(L) 2.94(L) 3.08(L) 2.80(L) 3.09(L) Hemoglobin 12.1 - 17.0 g/dL 9.1(L) 9.2(L) 8.6(L) 8.7(L) 9.0(L) 8.2(L) 9.1(L) Hematocrit 36.6 - 50.3 % 29. 4(L) 29. 6(L) 28. 4(L) 28. 7(L) 30. 0(L) 27. 2(L) 29. 3(L) MCV 80.0 - 99.0 FL 96.4 96.1 97.6 97.6 97.4 97.1 94.8 Platelets 104 - 183 K/uL 94(L) 93(L) 114(L) 114(L) 127(L) 123(L) 138(L) Lymphocytes 12 - 49 % 8(L) - - - - - - Monocytes 5 - 13 % 3(L) - - - - - - Eosinophils 0 - 7 % 0 - - - - - - Basophils 0 - 1 % 0 - - - - - - Bands 0 - 6 % 0 - - - - - - Blasts 0 % 0 - - - - - - Albumin 3.5 - 5.0 g/dL - 2. 2(L) 2. 3(L) 2. 3(L) 2. 3(L) 2. 1(L) 2. 4(L) Calcium 8.5 - 10.1 MG/DL - 8. 3(L) 8.6 8.8 8.8 7.8(L) 9.1 SGOT 15 - 37 U/L - 39(H) 22 18 18 18 21 Glucose 65 - 100 mg/dL - 89 99 99 99 99 103(H) BUN 6 - 20 MG/DL - 22(H) 18 23(H) 23(H) 19 21(H) Creatinine 0.70 - 1.30 MG/DL - 1.07 0.90 0.90 0.96 0.88 1.11 Sodium 136 - 145 mmol/L - 130(L) 132(L) 136 136 136 133(L) Potassium 3.5 - 5.1 mmol/L - 3. 2(L) 3.5 3.8 3.9 3.9 4.3 TSH 0.36 - 3.74 uIU/mL - - - - - - -  
LDH 85 - 241 U/L - 223 196 192 206 186 203 Some recent data might be hidden Lab Results Component Value Date/Time TSH 2.30 12/03/2019 03:29 AM  
 TSH 2.40 10/25/2019 07:39 PM  
 TSH 2.45 06/01/2019 04:16 AM  
 
 
ALLERGY: 
No Known Allergies CURRENT MEDICATIONS: 
Current Facility-Administered Medications Medication Dose Route Frequency  senna-docusate (PERICOLACE) 8.6-50 mg per tablet 1 Tab  1 Tab Oral PRN  
 fludrocortisone (FLORINEF) tablet 0.1 mg  0.1 mg Oral DAILY  warfarin (COUMADIN) tablet 4 mg  4 mg Oral QPM  
 cholecalciferol (VITAMIN D3) (1000 Units /25 mcg) tablet 2 Tab  2,000 Units Oral DAILY  clonazePAM (KlonoPIN) tablet 0.5 mg  0.5 mg Oral QHS  venlafaxine-SR (EFFEXOR-XR) capsule 150 mg  150 mg Oral DAILY WITH BREAKFAST  midodrine (PROAMITINE) tablet 5 mg  5 mg Oral TID WITH MEALS  
 hydrocortisone (CORTAID) 1 % cream   Topical TID PRN  
 sildenafil (pulm.hypertension) (REVATIO) tablet 20 mg  20 mg Oral TID  thiamine HCL (B-1) tablet 100 mg  100 mg Oral DAILY  levoFLOXacin (LEVAQUIN) 750 mg in D5W IVPB  750 mg IntraVENous Q24H  cefepime (MAXIPIME) 2 g in 0.9% sodium chloride (MBP/ADV) 100 mL  2 g IntraVENous Q8H  
 oxybutynin (DITROPAN) tablet 5 mg  5 mg Oral Q6H PRN  
 magnesium oxide (MAG-OX) tablet 800 mg  800 mg Oral BID  lidocaine (URO-JET/ GLYDO) 2 % jelly   Urethral PRN  
 epoetin yvonne-epbx (RETACRIT) injection 20,000 Units  20,000 Units SubCUTAneous Q MON, WED & FRI  albuterol-ipratropium (DUO-NEB) 2.5 MG-0.5 MG/3 ML  3 mL Nebulization Q6H PRN  therapeutic multivitamin (THERAGRAN) tablet 1 Tab  1 Tab Oral DAILY  alteplase (CATHFLO) 1 mg in sterile water (preservative free) 1 mL injection  1 mg InterCATHeter PRN  finasteride (PROSCAR) tablet 5 mg  5 mg Oral DAILY  diphenhydrAMINE (BENADRYL) capsule 25 mg  25 mg Oral QHS PRN  
 octreotide (SANDOSTATIN) injection 25 mcg  25 mcg SubCUTAneous TID  pantoprazole (PROTONIX) tablet 40 mg  40 mg Oral ACB  allopurinol (ZYLOPRIM) tablet 100 mg  100 mg Oral DAILY  arformoterol (BROVANA) neb solution 15 mcg  15 mcg Nebulization BID RT And  
 budesonide (PULMICORT) 500 mcg/2 ml nebulizer suspension  500 mcg Nebulization BID RT  
 lactobac ac& pc-s.therm-b.anim (TIAN Q/RISAQUAD)  1 Cap Oral DAILY  oxyCODONE IR (ROXICODONE) tablet 5 mg  5 mg Oral Q6H PRN  
 tamsulosin (FLOMAX) capsule 0.4 mg  0.4 mg Oral DAILY  Warfarin MD/NP dosing   Other PRN  
 sodium chloride (NS) flush 5-40 mL  5-40 mL IntraVENous Q8H  
 sodium chloride (NS) flush 5-40 mL  5-40 mL IntraVENous PRN  
 acetaminophen (TYLENOL) tablet 650 mg  650 mg Oral Q6H PRN  
  naloxone (NARCAN) injection 0.4 mg  0.4 mg IntraVENous PRN  
 ondansetron (ZOFRAN) injection 4 mg  4 mg IntraVENous Q4H PRN  
 sucralfate (CARAFATE) tablet 1 g  1 g Oral AC&HS  influenza vaccine 2019-20 (6 mos+)(PF) (FLUARIX/FLULAVAL/FLUZONE QUAD) injection 0.5 mL  0.5 mL IntraMUSCular PRIOR TO DISCHARGE  hydrALAZINE (APRESOLINE) 20 mg/mL injection 20 mg  20 mg IntraVENous Q6H PRN Thank you for allowing me to participate in this patient's care. TIERRA Nunes [address] [place] [address], [address] Phone: [phone] Fax: [phone]

## 2020-01-14 NOTE — PROGRESS NOTES
ID Progress Note 2020 Subjective: He is having a better day today Objective: Antibiotics: 1. Levaquin 2. Cefepime 3. Rifampin 4. Fluconazole 5. Vancomycin 6. Cefazolin 7. Zosyn Vitals:  
Visit Vitals BP 91/72 (BP 1 Location: Left arm, BP Patient Position: At rest) Pulse 84 Temp 98.4 °F (36.9 °C) Resp 20 Ht 6' 2\" (1.88 m) Wt 78.2 kg (172 lb 6.4 oz) SpO2 96% BMI 22.13 kg/m² Tmax:  Temp (24hrs), Av.2 °F (36.8 °C), Min:97.9 °F (36.6 °C), Max:98.4 °F (36.9 °C) Exam:  Lungs: A few basilar rales Heart:  regular rate and rhythm Abdomen:  soft, non-tender. Bowel sounds normal. No masses,  no organomegaly Urine in neal is grossly clear Sternal wound with small defect lower 1/4 of wound--clean and dry Labs:     
Recent Labs  
  20 
9557 20 
0448 20 
0356 20 
9500 WBC 2.8* 2.5* 4.1 5.2 HGB 9.1* 9.2* 8.6* 8.7* PLT 94* 93* 114* 114* BUN  --  22* 18 23* CREA  --  1.07 0.90 0.90 SGOT  --  39* 22 18 AP  --  102 107 101 TBILI  --  0.5 0.6 0.4 Cultures: No results found for: DANIELLE Lab Results Component Value Date/Time Culture result: PENDING 2020 10:01 AM  
 Culture result: NO GROWTH AFTER 18 HOURS 2020 09:52 AM  
 Culture result: NO GROWTH 3 DAYS 2020 02:04 PM  
 
 
Radiology:  
 
Line/Insert Date:        
 
Assessment:  
 
1. UTI--Serratia (2 biotypes) from culture and small amount of Enterococcus--now with Pseudomonas from culture of urine and blood 2. CHF/cardiomyopathy 3. ?aspiration event(s) 4. Renal insufficiency 5. Respiratory difficulties Objective: 1. Continue current therapy 2. Presence of LVAD in face of bacteremia may require longer course of therapy, or at least PO Rx for a time 3. Work-up any fever 4. Follow-up pending cultures and studies 5. Discussed with his wife at bedside Parveen Prajapati MD

## 2020-01-14 NOTE — PROGRESS NOTES
Problem: Mobility Impaired (Adult and Pediatric) Goal: *Acute Goals and Plan of Care (Insert Text) Description FUNCTIONAL STATUS PRIOR TO ADMISSION: Patient was modified independent using a single point cane for functional mobility. Patient reports an increasingly sedentary lifestyle 2* fatigue and SOB/dyspnea. Retired (so is his wife). Patient reports x 3 falls within the last couple of weeks. Patient is wearing either nasal cannula or CPAP at night. LVAD work-up has been initiated. HOME SUPPORT PRIOR TO ADMISSION: The patient lived with his wife, but did not require physical assistance. Physical Therapy Goals: 
 
Goals upgraded as appropriate 1/07/2020- Goals appropriate 1/9/2020 on weekly reassessment 1. Patient will move from supine to sit and sit to supine, scoot up and down and roll side to side in bed with supervision within 7 days. 2.  Patient will perform sit to/from stand with minimal assistance x 1 within 7 days. 3.  Patient will ambulate 50 feet with least restrictive assistive device and moderate assistance within 7 days. 4.  Stair goal to be formulated when appropriate. 5.  Patient will perform cardiac exercises per protocol with supervision within 7 days. 6.  Patient will verbally and functionally recall mindful movement principles (Move in the Tube) with minimal verbal cuing/reminding within 7 days. 7.  Patient will perform power exchange for power module to/from battery with minimal assistance within 7 days. Reassessed on 1/2/2020 and goals not met, carry-over. Reassessed on 12/26/2019, goals not met but remain appropriate, so carry over Weekly reassessment completed 12/19/2019 and goals downgraded as appropriate. 1.  Patient will move from supine to sit and sit to supine, scoot up and down and roll side to side in bed with contact guard assistance within 7 days. 2.  Patient will perform sit to/from stand with minimal assistance x 1 within 7 days. 3.  Patient will ambulate 25 feet with least restrictive assistive device and moderate assistance within 7 days. 4.  Stair goal to be formulated when appropriate. 5.  Patient will perform cardiac exercises per protocol with supervision within 7 days. 6.  Patient will verbally and functionally recall mindful movement principles (Move in the Tube) with minimal verbal cuing/reminding within 7 days. 7.  Patient will perform power exchange for power module to/from battery with moderate assistance within 7 days. Re-evaluation completed 11/20/2019 and new goals formulated following LVAD implantation. Reviewed and cont 12/3/2019. Reviewed and continued 12/12/2019 1. Patient will move from supine to sit and sit to supine, scoot up and down and roll side to side in bed with minimal assistance/contact guard assist within 7 days. 2.  Patient will perform sit to/from stand with minimal assistance/contact guard assist within 7 days. 3.  Patient will ambulate 150 feet with least restrictive assistive device and minimal assistance/contact guard assist within 7 days. 4.  Patient will ascend/descend 4 stairs with handrail(s) with minimal assistance/contact guard assist within 7 days. 5.  Patient will perform cardiac exercises per protocol with supervision within 7 days. 6.  Patient will verbally and functionally recall 3/3 sternal precautions within 7 days. 7.  Patient will perform power exchange for power module to/from battery with moderate assistance  within 7 days. Initiated 10/27/2019; updated 11/15/2019 1. Patient will move from supine to sit and sit to supine, scoot up and down and roll side to side in bed with independence within 7 days. 2.  Patient will perform sit to/from stand with supervision/set-up within 7 days. 3.  Patient will ambulate 200 feet with least restrictive assistive device and supervision/set-up within 7 days.   
4.  Patient will ascend/descend 4 stairs with  handrail(s) with supervision/set-up within 7 days for functional strengthening and community reintegration. [place] 5.  Patient will verbally and functionally recall 3/3 sternal precautions within 7 days in preparation for LVAD implantation. 6.  Patient will perform a mock power exchange for power module to/from battery with supervision/set-up within 7 days in preparation for LVAD implantation. 1.  Patient will move from supine to sit and sit to supine, scoot up and down and roll side to side in bed with independence within 7 days. 2.  Patient will perform sit to/from stand with supervision/set-up within 7 days. 3.  Patient will ambulate 150 feet with least restrictive assistive device and supervision/set-up within 7 days. 4.  Patient will ascend/descend 4 stairs with  handrail(s) with supervision/set-up within 7 days for functional strengthening and community reintegration. [place] 5.  Patient will verbally and functionally recall 3/3 sternal precautions within 7 days in preparation for LVAD implantation. 6.  Patient will perform a mock power exchange for power module to/from battery with supervision/set-up within 7 days in preparation for LVAD implantation. Outcome: Progressing Towards Goal 
 
PHYSICAL THERAPY TREATMENT Patient: Marta Ovalles (75 y.o. male) Date: 1/14/2020 Diagnosis: Acute decompensated heart failure ([facility] [address] [I50.9] <principal problem not specified> Procedure(s) (LRB): LEFT BRACHIAL THROMBECTOMY (Left) 50 Days Post-Op Precautions: Fall Chart, physical therapy assessment, plan of care and goals were reviewed. ASSESSMENT Patient continues with skilled PT services and is progressing towards goals. Pt agreeable to therapy reports feeing better. Pt sat EOB and reported feeling lightheaded, requiring pt to return supine. Attempted bed in chair position pt again reported lightheaded and returned supine with with slight elevated HOB. Will continue to progress as  able.   
Supine MAP 70 per RN 
 Sitting EOB MAP 59 Bed in chair position MAP 60 Current Level of Function Impacting Discharge (mobility/balance): Other factors to consider for discharge: MAPs PLAN : 
Patient continues to benefit from skilled intervention to address the above impairments. Continue treatment per established plan of care. to address goals. Recommendation for discharge: (in order for the patient to meet his/her long term goals) Therapy 3 hours per day 5-7 days per week This discharge recommendation: 
Has been made in collaboration with the attending provider and/or case management IF patient discharges home will need the following DME: to be determined (TBD) SUBJECTIVE:  
Patient stated I feel better today.  OBJECTIVE DATA SUMMARY:  
Critical Behavior: 
Neurologic State: Lethargic Orientation Level: Oriented X4 Cognition: Appropriate for age attention/concentration Safety/Judgement: Awareness of environment, Good awareness of safety precautions Functional Mobility Training: 
Bed Mobility: 
  
Supine to Sit: Moderate assistance Sit to Supine: Minimum assistance Transfers: 
  
  
     
  
     
  
  
  
  
Balance: 
  
Ambulation/Gait Training: 
  
  
  
  
  
  
  
  
  
  
  
  
  
  
  
  
  
 
Stairs: Therapeutic Exercises:  
 
Pain Rating: 
none Activity Tolerance:  
Poor and signs and symptoms of orthostatic hypotension Please refer to the flowsheet for vital signs taken during this treatment. After treatment patient left in no apparent distress:  
Supine in bed and Call bell within reach COMMUNICATION/COLLABORATION:  
The patients plan of care was discussed with: Registered Nurse Sharon Sever, PTA Time Calculation: 14 mins

## 2020-01-14 NOTE — PROGRESS NOTES
0730: Bedside and Verbal shift change report given to Tad Angel  (oncoming nurse) by Jacoby Gary (offgoing nurse). Report included the following information SBAR, Kardex, Procedure Summary, Intake/Output, MAR and Recent Results. Problem: Falls - Risk of  Goal: *Absence of Falls  Description  Document Geovanni Connor Fall Risk and appropriate interventions in the flowsheet. Outcome: Progressing Towards Goal  Note:   Fall Risk Interventions:  Mobility Interventions: Communicate number of staff needed for ambulation/transfer, Patient to call before getting OOB, PT Consult for mobility concerns, Strengthening exercises (ROM-active/passive)    Mentation Interventions: Evaluate medications/consider consulting pharmacy    Medication Interventions: Evaluate medications/consider consulting pharmacy, Patient to call before getting OOB, Teach patient to arise slowly    Elimination Interventions: Call light in reach, Patient to call for help with toileting needs, Urinal in reach    History of Falls Interventions: Evaluate medications/consider consulting pharmacy    Call phan and personal effects within reach. Bed locked and in low position. Non skid footwear in place.        Problem: LVAD Post-op Day 15 to Discharge  Goal: Diagnostic Test/Procedures  Outcome: Progressing Towards Goal  Goal: Medications  Outcome: Progressing Towards Goal  Goal: Discharge Planning  Outcome: Progressing Towards Goal  Goal: Respiratory  Outcome: Progressing Towards Goal  Goal: Psychosocial  Outcome: Progressing Towards Goal

## 2020-01-14 NOTE — PROGRESS NOTES
Talked with Skyler Cortes this morning - shared [facility] [address] hasn't received recent LVAD training for home health services. Acknowledged that [facility] was recently trained and services the [facility]. Skyler Julianenzo is agreeable to [facility]'s home health transitioning from [facility] to [facility].  called [facility] and spoke with Matthew Jaquez - closed out his referral. Sent updated clinicals/demograhpics to [facility]; they will continue to follow for home health needs. Vicky Novak, MSW, LCSW Clinical  [address]

## 2020-01-15 NOTE — PROGRESS NOTES
Physical Therapy Reviewed chart and discussed with RN. Pt is having frequent BM. Pt is declining mobility at this time due to unable to control. Per AM notes pt had decrease tolerance to bed in chair position. Will defer at this time and continue to progress as able.

## 2020-01-15 NOTE — PROGRESS NOTES
Chart reviewed and per PT note, patient is having frequent BM and declining mobility at this time due to unable to control. Per AM notes pt had decrease tolerance to bed in chair position. Will defer at this time and continue to progress as able. Thank you.

## 2020-01-15 NOTE — PROGRESS NOTES
0700: patient had an uneventful night. No complaints of pain, dizziness; tremors appeared to be less frequent. He slept with CPAP for approximately 5 hours. 0730: Bedside and Verbal shift change report given to Salvador Kendrick (oncoming nurse) by Maida Prakash (offgoing nurse). Report included the following information SBAR, Kardex, OR Summary, Intake/Output, MAR and Recent Results. Problem: Falls - Risk of 
Goal: *Absence of Falls Description Document Donato Naif Fall Risk and appropriate interventions in the flowsheet. Outcome: Progressing Towards Goal 
Note:  
Fall Risk Interventions: 
Mobility Interventions: Patient to call before getting OOB, PT Consult for mobility concerns, Strengthening exercises (ROM-active/passive) Mentation Interventions: Evaluate medications/consider consulting pharmacy Medication Interventions: Evaluate medications/consider consulting pharmacy, Patient to call before getting OOB, Teach patient to arise slowly Elimination Interventions: Call light in reach, Patient to call for help with toileting needs, Stay With Me (per policy) History of Falls Interventions: Evaluate medications/consider consulting pharmacy, Investigate reason for fall, Room close to nurse's station Problem: Pain Goal: *Control of Pain Outcome: Progressing Towards Goal 
Note: No complaints of pain 
  
Problem: LVAD Post-op Day 15 to Discharge Goal: Activity/Safety Outcome: Progressing Towards Goal 
Goal: Respiratory Outcome: Progressing Towards Goal 
Goal: Psychosocial 
Outcome: Progressing Towards Goal

## 2020-01-15 NOTE — PROGRESS NOTES
NUTRITION COMPLETE ASSESSMENT 
 
 
 
RECOMMENDATIONS:  
1. Continue Regular diet- encourage PO intakes. Adjusted ONS/snacks and breakfast meals. add daily MVI 2. Continue to encourage oral intake - family may bring foods from home 3. Continue Glucerna TID Please document  Severe Acute on Chronic Protein Calorie Malnutrition in patient diagnoses. Patient meets criteria for as evidenced by:  
ASPEN Malnutrition Criteria Acute Illness, Chronic Illness, or Social/Enviornmental: Acute illness Energy Intake: <75% est energy req for > 7 days Weight Loss: Greater than 7.5% x 3 mos Body Fat Loss: Moderate Muscle Mass Loss: Moderate Fluid Accumulation: Moderate ASPEN Malnutrition Score - Acute Illness: 19 Acute Illness - Malnutrition Diagnosis: Severe malnutrition. Interventions/Plan:  
Food/Nutrient Delivery:  (-) Commercial supplement(see below)   (d/c marinol) Initiate enteral nutrition Assessment:  
Reason for Assessment: Reassessment Diet: regular + snack Supplements: Glucerna 3x/day Nutritionally Significant Medications: [x] Reviewed & Includes: insulin, FloraQ, MagOx, Carafate, Flomax, MultiVit, Coumadin, Protonix,  
 
Meal intake:  
Patient Vitals for the past 168 hrs: 
 % Diet Eaten 01/15/20 1100 0 % 01/15/20 1006 75 % 01/14/20 1116 75 % 01/14/20 0932 50 % 01/13/20 1840 0 % 01/13/20 1258 10 % 01/13/20 0915 5 % 01/12/20 1831 0 % 01/12/20 1529 15 % 01/12/20 1128 0 % 01/11/20 1837 100 % 01/11/20 1112 85 % 01/11/20 0900 100 % 01/10/20 1200 100 % 01/10/20 0813 75 % 01/09/20 1502 100 % 01/09/20 0840 75 % 01/08/20 1407 75 % Subjective: Noted pt not feeling as well today. PO declining today. Continue Glucerna TID. Monitor appetite. Pt with 3 BMs today and 1 bout of n/v.  
 
Objective: 
Pt admitted for CHF. PMHx: HTN, CKD, chronic systolic heart failure, depression, others noted.  S/p removal of impella (placed 10/29) and LVAD placement on 11/18. Bacteremia with likely urinary source noted, abx rx. Lethargic today. Milrinone drip off, midodrine rx. Low sodium and chloride continue but stable. Renal following for JUAN J on CKD. Pt did not meet kcal/protein needs today, but over all on previous days since last note, pt had been doing well. Expect pt to consume more when bowels return to normal and not so lethargic. Weekly PAB being checked but not indicator of nutrition status with pro-inflammatory condition of CHF. Do not recommend checking as a way to evaluate nutrition. Does however meet criteria for severe malnutrition with: (1) Severe wt loss over past 6 months (2) Severe muscle/fat wasting (3) Inadequate oral intake. Estimated Nutrition Needs:  
Kcals/day: 2045 Kcals/day(1887-2045kcal) Protein: 75 g(75-90g (1-1.2g/kg - CKD)) Fluid: 1887 ml(1ml/kcal) Based On: [place] Jeor(x 1.2-1.3) Weight Used: Actual wt(74.7kg) Pt expected to meet estimated nutrient needs:  [x]   Yes []  No [] Unable to predict at this time Nutrition Diagnosis: 1. Malnutrition related to CHF vs depression vs malignancy as evidenced by >10% weight loss x 6 months; severe muscle/fat wasting; consuming >70% needs orally 
- continues 2. Inadequate oral intake related to poor appetite as evidenced by less than 50% most meals consumed; only 1-2 supplements/day 
- improving 3. Swallowing difficulty(resolved) related to weakness with moderate pharyngeal dysphagia as evidenced by regular with thin liquids 
- resolved Goals:   
 Initiation of EN to meet 75% needs in 48hrs Monitoring & Evaluation: - Total energy intake, Liquid meal replacement, Enteral/parenteral nutrition intake - Weight/weight change Previous Nutrition Goals Met:   Progressing Previous Recommendations:    Yes 
 
Education & Discharge Needs: 
 [x] None Identified 
 [] Identified and addressed  [x] Participated in care plan, discharge planning, and/or interdisciplinary rounds Cultural, Latter-day and ethnic food preferences identified: None Skin Integrity: []Intact  [x]Other: sternal wound Edema: []None [x]Other: trace Last BM: 1/15 Food Allergies: [x]None []Other Diet Restrictions: Cultural/Taoism Preference(s): None Anthropometrics:   
Weight Loss Metrics 1/15/2020 10/25/2019 10/25/2019 10/25/2019 9/30/2019 9/18/2019 9/16/2019 Today's Wt 170 lb 13.7 oz - 193 lb 6.4 oz - 199 lb 14.4 oz 196 lb 199 lb BMI - 21.94 kg/m2 - 24.83 kg/m2 25.67 kg/m2 25.16 kg/m2 25.55 kg/m2 Weight Source: Bed Height: 6' 2\" (188 cm), Body mass index is 21.94 kg/m². IBW : 86.2 kg (190 lb), % IBW (Calculated): 96.32 % 
 ,   
 
Labs:   
Lab Results Component Value Date/Time Sodium 131 (L) 01/15/2020 04:46 AM  
 Potassium 3.2 (L) 01/15/2020 04:46 AM  
 Chloride 99 01/15/2020 04:46 AM  
 CO2 25 01/15/2020 04:46 AM  
 Glucose 91 01/15/2020 04:46 AM  
 BUN 24 (H) 01/15/2020 04:46 AM  
 Creatinine 1.04 01/15/2020 04:46 AM  
 Calcium 7.9 (L) 01/15/2020 04:46 AM  
 Magnesium 2.0 01/15/2020 04:46 AM  
 Phosphorus 3.3 11/27/2019 04:18 AM  
 Albumin 2.1 (L) 01/15/2020 04:46 AM  
 
Lab Results Component Value Date/Time Hemoglobin A1c 6.6 (H) 10/25/2019 02:56 PM  
 
 
Rusty Walden, [address] [facility]

## 2020-01-15 NOTE — CONSULTS
[facility] [place] [facility] 
[address], [address] [address] [facility], [address] W: [phone]  F: [phone] Reason for Consult:  
Nickie Reddy is a 71 y.o. male who is seen in consultation at the request of Rebecca Loja NP for evaluation of pancytopenia Treatment History: · EGD 11/11/19 normal 
· Colonoscopy 11/11/19 with moderate diverticulosis in the descending and sigmoid colon but was otherwise normal 
· Epoetin 20,000 units MWF History of Present Illness: The patient is a very pleasant [de-identified] y.o. male who was first seen by Dr. Garry Gifford at the end of October for thrombocytopenia, iron deficiency anemia, and mediastinal lymphadenopathy. The plan was for a PET scan after discharge as well as a colonoscopy once patient is well enough and IV iron. Patient had an EGD and colonoscopy 11/11/19. EGD was normal. Colonoscopy showed moderate diverticulosis in the descending and sigmoid colon but was otherwise normal. Patient previously had gross hematuria which was believed to be the cause of his blood loss anemia. Patient remains in the hospital and now has neutropenia, so hematology was re-consulted. Since Dr. Garry Gifford last saw patient, patient completed his LVAD evaluation and had an implant placed on 11/18/19. He is hemodynamically stable and remains in the hospital for PT/OT. Patient reports he is doing fair. He remains weak. He has been working with PT/OT but activity is limited due to orthostatic hypotension. He denies CP or SOB. He denies hemoptysis, hematemesis, melena, hematuria or epistaxis. Past Medical History:  
Diagnosis Date  Degenerative disc disease, lumbar  Heart failure ([address]  High cholesterol  Hypertension  Paroxysmal atrial fibrillation ([address] 4/2/2019  Spinal stenosis Past Surgical History:  
Procedure Laterality Date  COLONOSCOPY Left 11/11/2019  COLONOSCOPY at bedside performed by Deo Pagan MD at [facility] ENDOSCOPY  
 HX APPENDECTOMY  HX CORONARY ARTERY BYPASS GRAFT    
 triple  HX HERNIA REPAIR    
 HX IMPLANTABLE CARDIOVERTER DEFIBRILLATOR  CO CARDIOVERSION ELECTIVE ARRHYTHMIA EXTERNAL N/A 6/10/2019 EP CARDIOVERSION performed by Kayley Lilly MD at [place] [address], [address] CATH LAB  CO CARDIOVERSION ELECTIVE ARRHYTHMIA EXTERNAL N/A 2019 EP CARDIOVERSION performed by Kathie Moraes MD at [place] [address], [facility] [address] CATH LAB  CO INSJ/RPLCMT PERM DFB W/TRNSVNS LDS 1/DUAL CHMBR N/A 2019 INSERT ICD BIV MULTI performed by Shaylee Espinoza MD at [place] [address], [address] CATH LAB  CO TCAT IMPL WRLS P-ART PRS SNR L-T HEMODYN MNTR N/A 2019 IMPLANT HEART FAILURE MONITORING DEVICE performed by Brigette Carreon MD at [place] [address], [facility]  CATH LAB Social History Tobacco Use  Smoking status: Former Smoker Last attempt to quit: 2010 Years since quittin.1  Smokeless tobacco: Never Used Substance Use Topics  Alcohol use: Not Currently Comment: rarely Family History Problem Relation Age of Onset  Lung Disease Mother  Hypertension Mother [facility] Arthritis-osteo Mother  Heart Disease Mother  Heart Disease Father  Diabetes Father Current Facility-Administered Medications Medication Dose Route Frequency  warfarin (COUMADIN) tablet 5 mg  5 mg Oral ONCE  
 senna-docusate (PERICOLACE) 8.6-50 mg per tablet 1 Tab  1 Tab Oral PRN  
 collagenase (SANTYL) 250 unit/gram ointment   Topical DAILY  fludrocortisone (FLORINEF) tablet 0.1 mg  0.1 mg Oral DAILY  cholecalciferol (VITAMIN D3) (1000 Units /25 mcg) tablet 2 Tab  2,000 Units Oral DAILY  clonazePAM (KlonoPIN) tablet 0.5 mg  0.5 mg Oral QHS  venlafaxine-SR (EFFEXOR-XR) capsule 150 mg  150 mg Oral DAILY WITH BREAKFAST  midodrine (PROAMITINE) tablet 5 mg  5 mg Oral TID WITH MEALS  
 hydrocortisone (CORTAID) 1 % cream   Topical TID PRN  
  sildenafil (pulm.hypertension) (REVATIO) tablet 20 mg  20 mg Oral TID  thiamine HCL (B-1) tablet 100 mg  100 mg Oral DAILY  levoFLOXacin (LEVAQUIN) 750 mg in D5W IVPB  750 mg IntraVENous Q24H  cefepime (MAXIPIME) 2 g in 0.9% sodium chloride (MBP/ADV) 100 mL  2 g IntraVENous Q8H  
 oxybutynin (DITROPAN) tablet 5 mg  5 mg Oral Q6H PRN  
 magnesium oxide (MAG-OX) tablet 800 mg  800 mg Oral BID  lidocaine (URO-JET/ GLYDO) 2 % jelly   Urethral PRN  
 epoetin yvonne-epbx (RETACRIT) injection 20,000 Units  20,000 Units SubCUTAneous Q MON, WED & FRI  albuterol-ipratropium (DUO-NEB) 2.5 MG-0.5 MG/3 ML  3 mL Nebulization Q6H PRN  therapeutic multivitamin (THERAGRAN) tablet 1 Tab  1 Tab Oral DAILY  alteplase (CATHFLO) 1 mg in sterile water (preservative free) 1 mL injection  1 mg InterCATHeter PRN  finasteride (PROSCAR) tablet 5 mg  5 mg Oral DAILY  diphenhydrAMINE (BENADRYL) capsule 25 mg  25 mg Oral QHS PRN  
 octreotide (SANDOSTATIN) injection 25 mcg  25 mcg SubCUTAneous TID  pantoprazole (PROTONIX) tablet 40 mg  40 mg Oral ACB  allopurinol (ZYLOPRIM) tablet 100 mg  100 mg Oral DAILY  arformoterol (BROVANA) neb solution 15 mcg  15 mcg Nebulization BID RT And  
 budesonide (PULMICORT) 500 mcg/2 ml nebulizer suspension  500 mcg Nebulization BID RT  
 lactobac ac& pc-s.therm-b.anim (TIAN Q/RISAQUAD)  1 Cap Oral DAILY  oxyCODONE IR (ROXICODONE) tablet 5 mg  5 mg Oral Q6H PRN  
 tamsulosin (FLOMAX) capsule 0.4 mg  0.4 mg Oral DAILY  Warfarin MD/NP dosing   Other PRN  
 sodium chloride (NS) flush 5-40 mL  5-40 mL IntraVENous Q8H  
 sodium chloride (NS) flush 5-40 mL  5-40 mL IntraVENous PRN  
 acetaminophen (TYLENOL) tablet 650 mg  650 mg Oral Q6H PRN  
 naloxone (NARCAN) injection 0.4 mg  0.4 mg IntraVENous PRN  
 ondansetron (ZOFRAN) injection 4 mg  4 mg IntraVENous Q4H PRN  
 sucralfate (CARAFATE) tablet 1 g  1 g Oral AC&HS  
  influenza vaccine 2019-20 (6 mos+)(PF) (FLUARIX/FLULAVAL/FLUZONE QUAD) injection 0.5 mL  0.5 mL IntraMUSCular PRIOR TO DISCHARGE  hydrALAZINE (APRESOLINE) 20 mg/mL injection 20 mg  20 mg IntraVENous Q6H PRN No Known Allergies Review of Systems: A complete review of systems was obtained, negative except as described above. Physical Exam:  
 
Visit Vitals BP 91/72 (BP 1 Location: Left arm, BP Patient Position: At rest) Pulse 71 Temp 98 °F (36.7 °C) Resp 16 Ht 6' 2\" (1.88 m) Wt 170 lb 13.7 oz (77.5 kg) SpO2 95% BMI 21.94 kg/m² ECOG PS: 2 General: No distress Eyes: PERRL, anicteric sclerae HENT: Atraumatic, OP clear Neck: Supple Respiratory: CTAB, normal respiratory effort GI: Soft, nontender, nondistended, no masses MS: In bed, moves all extremities Skin: Warm and dry Psych: Alert, oriented, appropriate affect, normal judgment/insight Results:  
 
Lab Results Component Value Date/Time WBC 2.4 (L) 01/15/2020 04:46 AM  
 HGB 9.0 (L) 01/15/2020 04:46 AM  
 HCT 29.2 (L) 01/15/2020 04:46 AM  
 PLATELET 90 (L) 98/38/4003 04:46 AM  
 MCV 96.1 01/15/2020 04:46 AM  
 ABS. NEUTROPHILS 2.5 01/14/2020 09:52 AM  
 
Lab Results Component Value Date/Time Sodium 131 (L) 01/15/2020 04:46 AM  
 Potassium 3.2 (L) 01/15/2020 04:46 AM  
 Chloride 99 01/15/2020 04:46 AM  
 CO2 25 01/15/2020 04:46 AM  
 Glucose 91 01/15/2020 04:46 AM  
 BUN 24 (H) 01/15/2020 04:46 AM  
 Creatinine 1.04 01/15/2020 04:46 AM  
 GFR est AA >60 01/15/2020 04:46 AM  
 GFR est non-AA >60 01/15/2020 04:46 AM  
 Calcium 7.9 (L) 01/15/2020 04:46 AM  
 Glucose (POC) 137 (H) 01/07/2020 11:16 AM  
 
Lab Results Component Value Date/Time Bilirubin, total 0.4 01/15/2020 04:46 AM  
 ALT (SGPT) 41 01/15/2020 04:46 AM  
 AST (SGOT) 64 (H) 01/15/2020 04:46 AM  
 Alk.  phosphatase 107 01/15/2020 04:46 AM  
 Protein, total 5.8 (L) 01/15/2020 04:46 AM  
 Albumin 2.1 (L) 01/15/2020 04:46 AM  
 Globulin 3.7 01/15/2020 04:46 AM  
 
CT C/A/P 10/25/19 IMPRESSION:  
1. Low-grade nonspecific mediastinal adenopathy. 2. Trace right pleural effusion. 3. Emphysema. 4. Left renal atrophy. 5. Colonic diverticula. 6. Moderate bladder distention with diverticula CTA CHEST 1/14/20 IMPRESSION:  
1. No occlusion or stenosis of the LVAD outflow tubing. 2. Nonocclusive radiodense foreign body in the posterior basilar left lower 
lobar artery. 3. Decreased pulmonary edema. Bilateral lower lobe atelectasis more likely than 
pneumonia. 4. Unchanged lymphadenopathy. Other incidental findings are described above. 1/15/20 Peripheral blood, smear:  
Pancytopenia without dysmyelopoiesis, dyserythropoiesis or platelet aggregation. Records reviewed and summarized above. Radiology report(s) reviewed above. Assessment:  
1) Neutropenia Peripheral blood smear demonstrates pancytopenia without dysmyelopoiesis, dyserythropoiesis or platelet aggregation. Patient does not have any signs or symptoms of infection May be secondary to nutritional deficiency, so will check vitamin B12, folate, and copper levels The following drugs have been associated with neutropenia Allopurinol, Maxipime, Clonopin, Levaquin, Apresoline,and Revatio. So the value of these drugs needs to be considered on an individual basis for this patient. 2) Anemia with iron deficiency EGD and colonoscopy 11/11/19 did not show any obvious cause of bleeding It was believed that patient's iron deficiency anemia was secondary to gross hematuria. Will recheck iron studies now that patient's hematuria has resolved. Patient is receiving Epoetin MWF 
 
3) Thrombocytopenia HIT panel checked when thrombocytopenia first started 10/31/19 and was negative May be secondary to nutritional deficiency 4) Mediastinal lymphadenopathy Unchanged on CTA Chest 1/14/20 Concerning for bronchogenic carcinoma. Patient will need a PET scan after discharge 5) CHF S/p LVAD 11/18/19 Patient on Coumadin for anticoagulation for LVAD with a INR goal 1.5-2 Management per cardiology and cardiac surgery 6) CAD s/p CABG Management per cardiology and cardiac surgery 7) CKD 3, atrophic left kidney Likely contributing to anemia Patient is on Epoetin MWF 
 
8) COPD 
 
9) Orthostatic hypotension Patient on midodrine and Florinef Management per cardiology 10) Malnutrition Likely contributing to pancytopenia RD is following for nutritional needs Plan: · CBC daily · Will add AM labs: iron profile, ferritin, folate, vitamin B12, copper · Recommend PRBC transfusion for Hgb < 7.0 g/dL · Recommend PLT transfusion for PLT < 20 K or active bleeding · PET scan once patient is discharged · Evaluate the potential benefits of the drugs that have been associated with neutropenia. Further plan per Dr. Garry Gifford Will follow Call if questions I appreciate the opportunity to participate in Kit Sona Kiser's care.

## 2020-01-15 NOTE — PROGRESS NOTES
0730:  Bedside shift change report given to Allison Barbour RN (oncoming nurse) by Vinay Pulliam RN (offgoing nurse). Report included the following information SBAR, Kardex, Procedure Summary, Intake/Output, MAR, and Recent Results. 9164:  Patient received in chair position, appears very lethargic, MAP 64. Patient returned to supine position, MAP 72 but patient still feels \"dizzy and sleepy\". 0830:  MAP 84. Patient states he doesn't feel dizzy anymore but is still sleepy. 1100:  Patient continues to appear lethargic, vomited small amount. Had large soft bowel movement. 1245:  Patient had two small loose bowel movements. 1425:  Driveline dressing change performed under sterile technique. No drainage noted. 1930:  Bedside shift change report given to KATRIN De La Cruz (oncoming nurse) by Allison Barbour RN (offgoing nurse). Report included the following information SBAR, Kardex, Procedure Summary, Intake/Output, MAR and Recent Results. Problem: Falls - Risk of 
Goal: *Absence of Falls Description Document Heatherlashaelucie  Fall Risk and appropriate interventions in the flowsheet. Outcome: Progressing Towards Goal 
Note: Fall Risk Interventions: 
Mobility Interventions: Patient to call before getting OOB, Strengthening exercises (ROM-active/passive) Mentation Interventions: Evaluate medications/consider consulting pharmacy Medication Interventions: Teach patient to arise slowly, Utilize gait belt for transfers/ambulation, Patient to call before getting OOB Elimination Interventions: Call light in reach, Patient to call for help with toileting needs, Toileting schedule/hourly rounds History of Falls Interventions: Evaluate medications/consider consulting pharmacy, Investigate reason for fall, Room close to nurse's station Problem: Pain Goal: *Control of Pain Outcome: Progressing Towards Goal 
  
Problem: Pressure Injury - Risk of 
Goal: *Prevention of pressure injury Description Document Mich Scale and appropriate interventions in the flowsheet. Offload heels Turn approximately every 2 hours Outcome: Progressing Towards Goal 
Note: Pressure Injury Interventions: 
Sensory Interventions: Assess changes in LOC Moisture Interventions: Maintain skin hydration (lotion/cream), Minimize layers Activity Interventions: Increase time out of bed, Pressure redistribution bed/mattress(bed type) Mobility Interventions: HOB 30 degrees or less, Pressure redistribution bed/mattress (bed type) Nutrition Interventions: Document food/fluid/supplement intake, Discuss nutritional consult with provider, Offer support with meals,snacks and hydration Friction and Shear Interventions: Lift sheet, HOB 30 degrees or less Problem: Heart Failure: Discharge Outcomes Goal: *Demonstrates ability to perform prescribed activity without shortness of breath or discomfort Outcome: Progressing Towards Goal 
  
Problem: LVAD Post-op Day 15 to Discharge Goal: Activity/Safety Outcome: Progressing Towards Goal 
Goal: Nutrition/Diet Outcome: Progressing Towards Goal 
Goal: Discharge Planning Outcome: Progressing Towards Goal

## 2020-01-15 NOTE — PROGRESS NOTES
[address] [facility] in [place], [place] Inpatient Progress Note Patient name: Justice Weems Patient : 1950 Patient MRN: [id number] Attending MD: Kendall Govea MD 
Date of service: 01/15/20 Chief Complaint:  
Angel Myrick azucena LVAD implant 
  
HPI: Sona Kiser is a 71y.o. year old pleasant white male with a history of HTN, HLD, JOSE, CAD s/p cardiac arrest VFib s/p CABG () c/b sternal would infection and sternectomy, ischemic cardiomyopathy LVEF 15-20%, s/p ICD and with LBBB. He was admitted with acute on chronic systolic heart failure with massive volume overload > 20 lbs, in the setting of atrial fibrillation s/p failed DCCV and underwent DCCV and RHC on .  S/p BiVICD on 19 with Dr. Mark Thomas. He was discharged home home on IV milrinone on 19. [facility] has been followed closely by Dr. Janie Clark and the [facility]. 
  
Mr. Kiser was admitted for acute on chronic systolic heart failure. He underwent implantation of Impella 5.0 due to CS and has completed his LVAD evaluation. [facility] meets criteria for implant of HM3 as DT. He was NYHA Class IV prior to implant of Impella 5.0, has LVEF < 15%, was intolerant of GDMT due to symptomatic hypotension and renal dysfunction. He remains dependent on temporary MCS support. RHC  revealed compensated hemodynamics on Impella. His renal function has improved dramatically with improvement in his hemodynamics. He is not a suitable transplant candidate due to single kidney, sternectomy, debility, and frailty. He was reviewed by Nina Lim and felt to be a high risk heart transplant candidate due to multiple co-morbidities as well as the afore mentioned conditions.  He remained in the CCU and underwent a HM 3 implant as DT on . He was weaned off of pressors and transferred to [place] [address] where he continues to undergo PT/OT and hemodynamic optimization.   
  
24Hr Events:  
Less SOB Nausea improved Used CPAP overnight More lethargic today Procedure:  Procedure(s): REMOVAL TEMPORARY LEFT VENTRICULAR ASSIST DEVICE, IMPLANTATION OF LEFT VENTRICULAR ASSIST DEVICE PERMANENT (VAD), ECC, JACQUE, EPI AORTIC US BY DR Arthur Vinson.    
POD:  58 
  
Impression / Plan:  
Heart Failure Status: NYHA Class IV, improved to NYHA Class III Chronic systolic heart failure due to ICM, NYHA Class IV, EF < 15%, cardiogenic shock bridged with Impella 5.0 support to HM 3 implant S/p Impella removal and LVAD implant 11/18/19 RPMs stable at 6700 Multiple PI events on interrogation - improved Continue Sildenafil 20 mg PO TID Intolerant of BB due to RV failure Intolerant of ACEi/ARB/ARNI/AA due to JUAN J on CKD 3 Intolerant of spironolactone d/t IVVD Continue midodrine 5 mg po TID for orthostatic hypotension No diuretics today Strict I/O, daily weights Continue LVAD education Dressing change frequency three times weekly, sutures removed 12/26/19 Ramp test this week, set speed increased to 6700 Chest CTA- no outflow graft occlusion Bacteremia - Pseudomonas Blood cultures (12/31/19) 2/4 bottles +Pseudomonas & 2/4 bottles GNRs Repeat BC NGTD On IV Cefepime and Levaquin Follow procalcitonin and lactic acid levels - both improving Repeat sputum Repeat UA negative Orthostatic Hypotension Midodrine 5 mg PO TID Cont Florinef 0.1 mg to daily Stim test unremarkable Leukopenia Manual diff unremarkable Peripheral smear Hematology consult Generalized myoclonus Improved Appreciate Neurology input Discontinued Keppra due to dizziness Head CT negative for acute process EEG suggestive of mild generalized encephalopathic process, possibly related to underlying structural brain injury vs metabolic abnormality Monitor closely Off Digoxin  
  
Anticoagulation for LVAD, INR goal 1.5-2 Goal decreased d/t persistent hematuria No ASA d/t hematuria INR 1.5 Coumadin - 5mg tonight Epistaxis Resolved  
  
 Acute Respiratory Failure post op Improved with aggressive diuresis Off supplemental O2 Pulmonary hygiene Cont home CPAP- must use while sleeping - will have hospital RT adjust mask/settings to improve patient compliance Acute on CKD 3, atrophic left kidney Appreciate Nephrology assistance Watch Cr, stable Avoid nephrotoxins, trend labs  
  
CAD s/p CABG Off ASA d/t hematuria No BB d/t RV failure Hold statin due to recent hepatic failure LHC performed 11/13 - low likelihood of benefit from revascularization  
  
Hx of VF arrest s/p BiV-ICD No recent shocks Keep K > 4 and Mg > 2  
Mag ox 800 mg po BID ICD reprogrammed to reduce diaphragmatic stimulation PAF Off Dig due to lethargy and tremors No BB due to RV failure Repeat TFTs WNL 
  
Hx of gout Uric acid WNL Acute blood loss anemia 
hgb stable Likely due to hematuria Cont octreotide 25 mcg SQ TID Fecal occult 12/14 negative, positive on 12/17 Appreciate urology recommendations Hematuria improved Monitor for now 
  
Urinary retention, c/b serratia UTI and hematuria Appreciate Urology consult Repeat UA with cx negative 12/15 Cystoscopy performed 11/13 shows catheter induced cystitis Pseudomonas aeruginosa positive UA culture (12/31/19) - on Cefepmine and levaquin Malnutrition Eating better Prealbumin weekly - 17 on 1/13 Appreciate Nutritionist assistance Diet as tolerated, encourage food from home, protein shakes Intolerant of mirtazapine d/t tremors, confusion Cont MVI add thiamine 100mg daily Hold on DHT placement for now COPD Appreciate Pulmonology assistance Continue nebs PRN   
  
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  
  
Hx of sternal wound infection, sternectomy Sternum closed post op- monitor Delayed skin healing mid portion of sternotomy - evaluate by Dr. Freddie Peabody, will continue IV abx and wet to dry dressings. Will likely require sternal wire after ~3 months from op date Vocal cord paralysis Improved ENT recs appreciated Speech therapy following - appreciate input Anxiety Change Klonopin to 0.5 mg PO qHS Depression Cont Effexor to 150 mg PO qAM  
Monitor rhythm strips for prolonged QTc 
  
Debility Continue PT/OT Bladder spasms Received pyridium 100mg x4 doses Oxybutynin 5mg Q6hr prn  
  
Ppx Protonix PT/OT  
+daily BM  
PICC placed 11/22/19  
  
Dispo: 
Remain in [facility] at this time Will need IP rehab. Not ready for discharge at this time Patient seen and examined. Data and note reviewed. I have discussed and agree with the plans as noted. 71year old male with a history of ICM s/p LVAD as DT whose course has been complicated by RV failure, pseudomonas bacteremia, and orthostatic hypotension. Encourage ongoing PT - suspect autonomic dysfunction due to prolonged bedrest. Currently on midodrine and florinef. Encourage IS and use of CPAP. Continue LVAD education. Will need inpatient rehab. Thank you for allowing us to participate in your patient's care. Mounika Oshea MD, Luisa Chavira Chief of Cardiology, [facility] Medical Director Calos Rueda [address] [place] [address] [address] [facility], [address] [place] [address] Office [phone] Fax [phone] LVAD INTERROGATION: 
Device interrogated in person Device function normal, normal flow, multiple PI events LVAD Pump Speed (RPM): [id number] Pump Flow (LPM): 5.2 MAP: 84 
PI (Pulsitility Index): 6.3 Power: 6.2  Test: Yes 
Back Up  at Bedside & Labeled: Yes Power Module Test: Yes Driveline Site Care: No 
Driveline Dressing: Clean, Dry, and Intact Outpatient: No 
MAP in Therapeutic Range (Outpatient): Yes Testing Alarms Reviewed: Yes 
Back up SC speed: 6700 Back up Low Speed Limit: 6000 Emergency Equipment with Patient?: Yes Emergency procedures reviewed?: Yes Drive line site inspected?: Yes Drive line intergrity inspected?: Yes Drive line dressing changed?: No 
 
PHYSICAL EXAM: 
Visit Vitals BP 91/72 (BP 1 Location: Left arm, BP Patient Position: At rest) Pulse 71 Temp 98 °F (36.7 °C) Resp 16 Ht 6' 2\" (1.88 m) Wt 170 lb 13.7 oz (77.5 kg) SpO2 95% BMI 21.94 kg/m² Physical Exam  
Constitutional: He is oriented to person, place, and time. He appears well-developed. He appears cachectic. No distress. HENT:  
Head: Normocephalic. Neck: Normal range of motion. Neck supple. No JVD present. Cardiovascular: Normal rate, regular rhythm and normal heart sounds. + VAD hum Pulmonary/Chest: Effort normal and breath sounds normal. No tachypnea. No respiratory distress. Abdominal: Soft. Bowel sounds are normal. He exhibits no distension. Musculoskeletal: Normal range of motion. General: No edema. Neurological: He is oriented to person, place, and time. More lethargic today Skin: Skin is warm and dry. Psychiatric: He has a normal mood and affect. His speech is normal and behavior is normal.  
~1 cm x 1 cm x 0.25 cm area of delayed closure on sternotomy. Site clean, no erythema or drainage noted. Subcutaneous tissue noted. Nursing note and vitals reviewed. REVIEW OF SYSTEMS: 
Review of Systems Constitutional: Positive for malaise/fatigue. Negative for chills and fever. HENT: Positive for hearing loss. Negative for nosebleeds. Eyes: Negative. Respiratory: Negative for cough and shortness of breath. Mild dyspnea Cardiovascular: Negative for chest pain, palpitations, orthopnea and leg swelling. Orthostatic Gastrointestinal: Negative for heartburn, nausea and vomiting. Genitourinary: Negative for dysuria and hematuria. Musculoskeletal: Negative. Neurological: Positive for dizziness and weakness. Negative for headaches. Mild dizziness - improving Endo/Heme/Allergies: Does not bruise/bleed easily. PAST MEDICAL HISTORY: 
Past Medical History:  
Diagnosis Date  Degenerative disc disease, lumbar  Heart failure ([facility]  High cholesterol  Hypertension  Paroxysmal atrial fibrillation ([facility] 2019  Spinal stenosis PAST SURGICAL HISTORY: 
Past Surgical History:  
Procedure Laterality Date  COLONOSCOPY Left 2019 COLONOSCOPY at bedside performed by Bryon Perez MD at [address]  HX CORONARY ARTERY BYPASS GRAFT    
 triple  HX HERNIA REPAIR    
 HX IMPLANTABLE CARDIOVERTER DEFIBRILLATOR  [place] CARDIOVERSION ELECTIVE ARRHYTHMIA EXTERNAL N/A 6/10/2019 EP CARDIOVERSION performed by Caridad Moralez MD at [address] CATH LAB  [place] CARDIOVERSION ELECTIVE ARRHYTHMIA EXTERNAL N/A 2019 EP CARDIOVERSION performed by Zeke Maldonado MD at [place] [address], [address] CATH LAB  [place] INSJ/RPLCMT PERM DFB W/TRNSVNS LDS 1/DUAL CHMBR N/A 2019 INSERT ICD BIV MULTI performed by Shona Anderson MD [place] [address], [address] CATH LAB  [place] TCAT IMPL WRLS P-ART PRS SNR L-T HEMODYN MNTR N/A 2019 IMPLANT HEART FAILURE MONITORING DEVICE performed by Vicky Chamorro MD [place] [address] CATH LAB FAMILY HISTORY: 
Family History Problem Relation Age of Onset  Lung Disease Mother  Hypertension Mother [employer] Arthritis-osteo Mother  Heart Disease Mother  Heart Disease Father  Diabetes Father SOCIAL HISTORY: 
Social History Socioeconomic History  Marital status:  Spouse name: Not on file  Number of children: Not on file  Years of education: Not on file  Highest education level: Not on file Tobacco Use  Smoking status: Former Smoker Last attempt to quit: 2010 Years since quittin.1  Smokeless tobacco: Never Used Substance and Sexual Activity  Alcohol use: Not Currently Comment: rarely  Drug use: Never Other Topics Concern LABORATORY RESULTS: 
  
Labs Latest Ref Rng & Units 1/15/2020 1/15/2020 1/14/2020 1/14/2020 1/13/2020 1/12/2020 1/11/2020 WBC 4.1 - 11.1 K/uL - 2. 4(L) 2. 8(L) 2. 5(L) 4.1 5.2 5.4  
RBC 4.10 - 5.70 M/uL - 3.04(L) 3.05(L) 3.08(L) 2.91(L) 2.94(L) 3.08(L) Hemoglobin 12.1 - 17.0 g/dL - 9. 0(L) 9.1(L) 9.2(L) 8.6(L) 8.7(L) 9.0(L) Hematocrit 36.6 - 50.3 % - 29. 2(L) 29. 4(L) 29. 6(L) 28. 4(L) 28. 7(L) 30. 0(L) MCV 80.0 - 99.0 FL - 96.1 96.4 96.1 97.6 97.6 97.4 Platelets 357 - 326 K/uL - 90(L) 94(L) 93(L) 114(L) 114(L) 127(L) Lymphocytes 12 - 49 % - - 8(L) - - - - Monocytes 5 - 13 % - - 3(L) - - - - Eosinophils 0 - 7 % - - 0 - - - - Basophils 0 - 1 % - - 0 - - - - Bands 0 - 6 % - - 0 - - - - Blasts 0 % - - 0 - - - - Albumin 3.5 - 5.0 g/dL - 2. 1(L) - 2. 2(L) 2. 3(L) 2. 3(L) 2. 3(L) Calcium 8.5 - 10.1 MG/DL - 7. 9(L) - 8. 3(L) 8.6 8.8 8.8 SGOT 15 - 37 U/L - 64(H) - 39(H) 22 18 18 Glucose 65 - 100 mg/dL - 91 - 89 99 99 99 BUN 6 - 20 MG/DL - 24(H) - 22(H) 18 23(H) 23(H) Creatinine 0.70 - 1.30 MG/DL - 1.04 - 1.07 0.90 0.90 0.96 Sodium 136 - 145 mmol/L - 131(L) - 130(L) 132(L) 136 136 Potassium 3.5 - 5.1 mmol/L - 3. 2(L) - 3. 2(L) 3.5 3.8 3.9 TSH 0.36 - 3.74 uIU/mL - - - - - - -  
LDH 85 - 241 U/L 224 236 - 223 196 192 206 Some recent data might be hidden Lab Results Component Value Date/Time TSH 2.30 12/03/2019 03:29 AM  
 TSH 2.40 10/25/2019 07:39 PM  
 TSH 2.45 06/01/2019 04:16 AM  
 
 
ALLERGY: 
No Known Allergies CURRENT MEDICATIONS: 
Current Facility-Administered Medications Medication Dose Route Frequency  warfarin (COUMADIN) tablet 5 mg  5 mg Oral ONCE  
 senna-docusate (PERICOLACE) 8.6-50 mg per tablet 1 Tab  1 Tab Oral PRN  
 collagenase (SANTYL) 250 unit/gram ointment   Topical DAILY  fludrocortisone (FLORINEF) tablet 0.1 mg  0.1 mg Oral DAILY  cholecalciferol (VITAMIN D3) (1000 Units /25 mcg) tablet 2 Tab  2,000 Units Oral DAILY  clonazePAM (KlonoPIN) tablet 0.5 mg  0.5 mg Oral QHS  venlafaxine-SR (EFFEXOR-XR) capsule 150 mg  150 mg Oral DAILY WITH BREAKFAST  midodrine (PROAMITINE) tablet 5 mg  5 mg Oral TID WITH MEALS  
 hydrocortisone (CORTAID) 1 % cream   Topical TID PRN  
 sildenafil (pulm.hypertension) (REVATIO) tablet 20 mg  20 mg Oral TID  thiamine HCL (B-1) tablet 100 mg  100 mg Oral DAILY  levoFLOXacin (LEVAQUIN) 750 mg in D5W IVPB  750 mg IntraVENous Q24H  cefepime (MAXIPIME) 2 g in 0.9% sodium chloride (MBP/ADV) 100 mL  2 g IntraVENous Q8H  
 oxybutynin (DITROPAN) tablet 5 mg  5 mg Oral Q6H PRN  
 magnesium oxide (MAG-OX) tablet 800 mg  800 mg Oral BID  lidocaine (URO-JET/ GLYDO) 2 % jelly   Urethral PRN  
 epoetin yvonne-epbx (RETACRIT) injection 20,000 Units  20,000 Units SubCUTAneous Q MON, WED & FRI  albuterol-ipratropium (DUO-NEB) 2.5 MG-0.5 MG/3 ML  3 mL Nebulization Q6H PRN  therapeutic multivitamin (THERAGRAN) tablet 1 Tab  1 Tab Oral DAILY  alteplase (CATHFLO) 1 mg in sterile water (preservative free) 1 mL injection  1 mg InterCATHeter PRN  finasteride (PROSCAR) tablet 5 mg  5 mg Oral DAILY  diphenhydrAMINE (BENADRYL) capsule 25 mg  25 mg Oral QHS PRN  
 octreotide (SANDOSTATIN) injection 25 mcg  25 mcg SubCUTAneous TID  pantoprazole (PROTONIX) tablet 40 mg  40 mg Oral ACB  allopurinol (ZYLOPRIM) tablet 100 mg  100 mg Oral DAILY  arformoterol (BROVANA) neb solution 15 mcg  15 mcg Nebulization BID RT And  
 budesonide (PULMICORT) 500 mcg/2 ml nebulizer suspension  500 mcg Nebulization BID RT  
 lactobac ac& pc-s.therm-b.anim (TIAN Q/RISAQUAD)  1 Cap Oral DAILY  oxyCODONE IR (ROXICODONE) tablet 5 mg  5 mg Oral Q6H PRN  
 tamsulosin (FLOMAX) capsule 0.4 mg  0.4 mg Oral DAILY  Warfarin MD/NP dosing   Other PRN  
 sodium chloride (NS) flush 5-40 mL  5-40 mL IntraVENous Q8H  
  sodium chloride (NS) flush 5-40 mL  5-40 mL IntraVENous PRN  
 acetaminophen (TYLENOL) tablet 650 mg  650 mg Oral Q6H PRN  
 naloxone (NARCAN) injection 0.4 mg  0.4 mg IntraVENous PRN  
 ondansetron (ZOFRAN) injection 4 mg  4 mg IntraVENous Q4H PRN  
 sucralfate (CARAFATE) tablet 1 g  1 g Oral AC&HS  influenza vaccine 2019-20 (6 mos+)(PF) (FLUARIX/FLULAVAL/FLUZONE QUAD) injection 0.5 mL  0.5 mL IntraMUSCular PRIOR TO DISCHARGE  hydrALAZINE (APRESOLINE) 20 mg/mL injection 20 mg  20 mg IntraVENous Q6H PRN Thank you for allowing me to participate in this patient's care. Jacob Cordon NP [address], [address] Phone: [phone] Fax: [phone]

## 2020-01-15 NOTE — PROGRESS NOTES
[facility] VAD/Heart Failure Progress Note Admit Date: 10/25/2019 POD:  51 Days Post-Op Procedure:  Procedure(s): LEFT BRACHIAL THROMBECTOMY Subjective: Dyspnea, fatigue, and weakness; drowsy; Objective:  
Vitals: 
Blood pressure 91/72, pulse 71, temperature 98 °F (36.7 °C), resp. rate 16, height 6' 2\" (1.88 m), weight 170 lb 13.7 oz (77.5 kg), SpO2 95 %. Temp (24hrs), Av.5 °F (36.9 °C), Min:98 °F (36.7 °C), Max:99.1 °F (37.3 °C) Hemodynamics: 
 CO: CO (l/min): 5.8 l/min CI: CI (l/min/m2): 2.8 l/min/m2 CVP: CVP (mmHg): 4 mmHg (19 1645) SVR: SVR (dyne*sec)/cm5: 1080 (dyne*sec)/cm5 (19 1200) PAP Systolic: PAP Systolic: 34 (66/57/75 3485) PAP Diastolic: PAP Diastolic: 13 (66/44/31 5683) PVR:   
 SV02: SVO2 (%): 67 % (19 1300) SCV02: SCVO2 (%): 75 % (10/29/19 1900) VAD Interrogation: LVAD (Heartmate) Pump Speed (RPM): [id number] Pump Flow (LPM): 5.2 Chatter in Lines: No 
PI (Pulsitility Index): 6.3 Power: 6.2 MAP: 82  Test: Yes 
Back Up  at Bedside & Labeled: Yes Power Module Test: Yes Driveline Site Care: No 
Driveline Dressing: Clean, Dry, and Intact EKG/Rhythm:   
 
Extubation Date / Time:  
 
CT Output:  
 
Ventilator: 
Ventilator Volumes Vt Set (ml): 550 ml (19 08) Vt Exhaled (Machine Breath) (ml): 639 ml (19 0826) Vt Spont (ml): 437 ml (19 1035) Ve Observed (l/min): 7.25 l/min (19 1035) Oxygen Therapy: 
Oxygen Therapy O2 Sat (%): 95 % (01/15/20 1100) Pulse via Oximetry: 89 beats per minute (204) O2 Device: Nasal cannula (01/15/20 1100) PEEP/CPAP (cm H2O): 1 cm H20 (01/15/20 1100) O2 Flow Rate (L/min): 2 l/min (20 1500) FIO2 (%): 21 % (20 0905) CXR: 
 
Admission Weight: Last Weight Weight: 192 lb 10.9 oz (87.4 kg) Weight: 170 lb 13.7 oz (77.5 kg) Intake / [place] / Margi [place]: 
Current Shift: 01/15 0701 - 01/15 190 In: 460 [P.O.:720; I.V.:200] Out: 750 [Urine:750] Last 24 hrs.:  
 
Intake/Output Summary (Last 24 hours) at 1/15/2020 1244 Last data filed at 1/15/2020 1100 Gross per 24 hour Intake 1890 ml Output 2550 ml Net -660 ml No results for input(s): CPK, CKMB, TROIQ in the last 72 hours. Recent Labs  
  01/15/20 
0446 01/14/20 
5070 01/14/20 0448 01/13/20 
0356 *  --  130* 132* K 3.2*  --  3.2* 3.5 CO2 25  --  27 27 BUN 24*  --  22* 18  
CREA 1.04  --  1.07 0.90 GLU 91  --  89 99 MG 2.0  --  2.0 1.7 WBC 2.4* 2.8* 2.5* 4.1 HGB 9.0* 9.1* 9.2* 8.6* HCT 29.2* 29.4* 29.6* 28.4* PLT 90* 94* 93* 114* Recent Labs  
  01/15/20 
0446 01/14/20 
0448 01/13/20 
0356 INR 1.5* 1.7* 1.8* PTP 15.0* 17.2* 17.7* APTT 42.4* 41.8* 37.6* No lab exists for component: PBNP Current Facility-Administered Medications:  
  warfarin (COUMADIN) tablet 5 mg, 5 mg, Oral, ONCE, Levi, Shana B, NP 
  senna-docusate (PERICOLACE) 8.6-50 mg per tablet 1 Tab, 1 Tab, Oral, PRN, Jennifer Mitchell MD, 1 Tab at 01/14/20 1305   collagenase (SANTYL) 250 unit/gram ointment, , Topical, DAILY, Levi, Shana B, NP 
  fludrocortisone (FLORINEF) tablet 0.1 mg, 0.1 mg, Oral, DAILY, Levi, Shana B, NP, 0.1 mg at 01/15/20 4395   cholecalciferol (VITAMIN D3) (1000 Units /25 mcg) tablet 2 Tab, 2,000 Units, Oral, DAILY, Ciro Herrera, NP, 2 Tab at 01/15/20 3541   clonazePAM (KlonoPIN) tablet 0.5 mg, 0.5 mg, Oral, QHS, Fabricio Herrera, NP, 0.5 mg at 01/14/20 2156   venlafaxine-SR (EFFEXOR-XR) capsule 150 mg, 150 mg, Oral, DAILY WITH BREAKFAST, Fabricio Herrera, NP, 150 mg at 01/15/20 0825 
  midodrine (PROAMITINE) tablet 5 mg, 5 mg, Oral, TID WITH MEALS, Fabricio Herrerae T, NP, 5 mg at 01/15/20 1104   hydrocortisone (CORTAID) 1 % cream, , Topical, TID PRN, Mounika Altman MD 
  sildenafil (pulm.hypertension) (REVATIO) tablet 20 mg, 20 mg, Oral, TID, Obdulia Agarwal MD, 20 mg at 01/15/20 7925   thiamine HCL (B-1) tablet 100 mg, 100 mg, Oral, DAILY, Becky Nunez NP, 100 mg at 01/15/20 7536   levoFLOXacin (LEVAQUIN) 750 mg in D5W IVPB, 750 mg, IntraVENous, Q24H, Juan C Olivares MD, Last Rate: 100 mL/hr at 01/14/20 1456, 750 mg at 01/14/20 1456   cefepime (MAXIPIME) 2 g in 0.9% sodium chloride (MBP/ADV) 100 mL, 2 g, IntraVENous, Q8H, Juan C Olivares MD, Last Rate: 200 mL/hr at 01/15/20 0718, 2 g at 01/15/20 0718 
  oxybutynin (DITROPAN) tablet 5 mg, 5 mg, Oral, Q6H PRN, Shana Sims NP, 5 mg at 01/01/20 1204   magnesium oxide (MAG-OX) tablet 800 mg, 800 mg, Oral, BID, Becky Nunez NP, 800 mg at 01/15/20 7235   lidocaine (URO-JET/ GLYDO) 2 % jelly, , Urethral, PRN, Mounika Altman MD 
  epoetin yvonne-epbx (RETACRIT) injection 20,000 Units, 20,000 Units, SubCUTAneous, Q MON, WED & FRI, Nick Blair MD, 20,000 Units at 01/13/20 2108   albuterol-ipratropium (DUO-NEB) 2.5 MG-0.5 MG/3 ML, 3 mL, Nebulization, Q6H PRN, Precious Montelongo MD, 3 mL at 12/25/19 1407   therapeutic multivitamin (THERAGRAN) tablet 1 Tab, 1 Tab, Oral, DAILY, Shana Sims NP, 1 Tab at 01/15/20 4827   alteplase (CATHFLO) 1 mg in sterile water (preservative free) 1 mL injection, 1 mg, InterCATHeter, PRN, Mounika Altman MD, 1 mg at 01/08/20 0919 
  finasteride (PROSCAR) tablet 5 mg, 5 mg, Oral, DAILY, Jose Hernandez MD, 5 mg at 01/15/20 2198   diphenhydrAMINE (BENADRYL) capsule 25 mg, 25 mg, Oral, QHS PRN, Anil Herrera NP, 25 mg at 01/05/20 2325   octreotide (SANDOSTATIN) injection 25 mcg, 25 mcg, SubCUTAneous, TID, Bryce Herrera NP, 25 mcg at 01/15/20 2909   pantoprazole (PROTONIX) tablet 40 mg, 40 mg, Oral, ACB, Anil Herrera NP, 40 mg at 01/15/20 0717 
  allopurinol (ZYLOPRIM) tablet 100 mg, 100 mg, Oral, DAILY, Anil Herrera NP, 100 mg at 01/15/20 0854   arformoterol (BROVANA) neb solution 15 mcg, 15 mcg, Nebulization, BID RT, 15 mcg at 01/15/20 1027 **AND** budesonide (PULMICORT) 500 mcg/2 ml nebulizer suspension, 500 mcg, Nebulization, BID RT, Aliya Herrera, NP, 500 mcg at 01/15/20 1028   lactobac ac& pc-s.therm-b.anim (TIAN Q/RISAQUAD), 1 Cap, Oral, DAILY, Janna Orr MD, 1 Cap at 01/15/20 4269 
  oxyCODONE IR (ROXICODONE) tablet 5 mg, 5 mg, Oral, Q6H PRN, Author Smith MD, 5 mg at 12/30/19 0405   tamsulosin (FLOMAX) capsule 0.4 mg, 0.4 mg, Oral, DAILY, Logan Kincaid MD, 0.4 mg at 01/15/20 0825   Warfarin MD/NP dosing, , Other, PRN, Mounika Altman MD 
  sodium chloride (NS) flush 5-40 mL, 5-40 mL, IntraVENous, Q8H, Shun PAULINO MD, 40 mL at 01/15/20 0717 
  sodium chloride (NS) flush 5-40 mL, 5-40 mL, IntraVENous, PRN, Author Smith MD, 10 mL at 01/02/20 2342   acetaminophen (TYLENOL) tablet 650 mg, 650 mg, Oral, Q6H PRN, Author Smith MD, 650 mg at 01/12/20 2033 
  naloxone [facility]) injection 0.4 mg, 0.4 mg, IntraVENous, PRN, Author Smith MD 
  ondansetron [facility]) injection 4 mg, 4 mg, IntraVENous, Q4H PRN, Author Smith MD, 4 mg at 01/12/20 2434   sucralfate (CARAFATE) tablet 1 g, 1 g, Oral, AC&HS, Author Smith MD, 1 g at 01/15/20 1104   influenza vaccine 2019-20 (6 mos+)(PF) (FLUARIX/FLULAVAL/FLUZONE QUAD) injection 0.5 mL, 0.5 mL, IntraMUSCular, PRIOR TO DISCHARGE, Mounika Altman MD 
  hydrALAZINE (APRESOLINE) 20 mg/mL injection 20 mg, 20 mg, IntraVENous, Q6H PRN, Shana Sims NP, 20 mg at 12/10/19 0541 A/P 
  
S/P LVAD - good flows Need for anti-coagulation - coumadin DM - insulin RV dysfunction - milrinone, diuretics  
  
Risk of morbidity and mortality - high Medical decision making - high complexity Signed By: Marty Reaves MD

## 2020-01-15 NOTE — PROGRESS NOTES
ID Progress Note 1/15/2020 Subjective: He is having a better day today Objective: Antibiotics: 1. Levaquin 2. Cefepime 3. Rifampin 4. Fluconazole 5. Vancomycin 6. Cefazolin 7. Zosyn Vitals:  
Visit Vitals BP 91/72 (BP 1 Location: Left arm, BP Patient Position: At rest) Pulse 79 Temp 98.1 °F (36.7 °C) Resp 20 Ht 6' 2\" (1.88 m) Wt 77.5 kg (170 lb 13.7 oz) SpO2 96% BMI 21.94 kg/m² Tmax:  Temp (24hrs), Av.4 °F (36.9 °C), Min:98 °F (36.7 °C), Max:99.1 °F (37.3 °C) Exam:  Lungs: A few basilar rales Heart:  regular rate and rhythm Abdomen:  soft, non-tender. Bowel sounds normal. No masses,  no organomegaly Urine in neal is grossly clear Sternal wound with small defect lower 1/4 of wound--clean and dry Labs:     
Recent Labs  
  01/15/20 
0446 20 
6280 20 
0448 20 
0356 WBC 2.4* 2.8* 2.5* 4.1 HGB 9.0* 9.1* 9.2* 8.6* PLT 90* 94* 93* 114* BUN 24*  --  22* 18  
CREA 1.04  --  1.07 0.90 SGOT 64*  --  39* 22   --  102 107 TBILI 0.4  --  0.5 0.6 Cultures: No results found for: [facility] Lab Results Component Value Date/Time Culture result: LIGHT YEAST (A) 2020 10:01 AM  
 Culture result: LIGHT NORMAL RESPIRATORY TIAN 2020 10:01 AM  
 Culture result: NO GROWTH 2 DAYS 2020 09:52 AM  
 
 
Radiology:  
 
Line/Insert Date:        
 
Assessment:  
 
1. UTI--Serratia (2 biotypes) from culture and small amount of Enterococcus--now with Pseudomonas from culture of urine and blood 2. CHF/cardiomyopathy 3. ?aspiration event(s) 4. Renal insufficiency 5. Respiratory difficulties Objective: 1. Continue current therapy 2. Presence of LVAD in face of bacteremia may require longer course of therapy, or at least PO Rx for a time 3. Work-up any fever 4. Follow-up pending cultures and studies Dionicio Montemayor MD

## 2020-01-16 NOTE — PROGRESS NOTES
ID Progress Note 2020 Subjective: He is having a better day today Objective: Antibiotics: 1. Levaquin 2. Cefepime 3. Rifampin 4. Fluconazole 5. Vancomycin 6. Cefazolin 7. Zosyn Vitals:  
Visit Vitals BP 91/72 (BP 1 Location: Left arm, BP Patient Position: At rest) Pulse 86 Temp 97.4 °F (36.3 °C) Resp 18 Ht 6' 2\" (1.88 m) Wt 77.7 kg (171 lb 4.8 oz) SpO2 97% BMI 21.99 kg/m² Tmax:  Temp (24hrs), Av.6 °F (36.4 °C), Min:96.6 °F (35.9 °C), Max:98.1 °F (36.7 °C) Exam:  Lungs: A few basilar rales Heart:  regular rate and rhythm Abdomen:  soft, non-tender. Bowel sounds normal. No masses,  no organomegaly Urine in neal is grossly clear Sternal wound with small defect lower 1/4 of wound--clean and dry Labs:     
Recent Labs  
  20 
0457 01/15/20 
0446 20 
8992 20 
0448 WBC 2.4* 2.4* 2.8* 2.5* HGB 9.5* 9.0* 9.1* 9.2*  
PLT 83* 90* 94* 93*  
BUN 23* 24*  --  22* CREA 1.02 1.04  --  1.07  
SGOT 57* 64*  --  39* * 107  --  102 TBILI 0.4 0.4  --  0.5 Cultures: No results found for: [facility] Lab Results Component Value Date/Time Culture result: LIGHT YEAST (A) 2020 10:01 AM  
 Culture result: LIGHT NORMAL RESPIRATORY TIAN 2020 10:01 AM  
 Culture result: NO GROWTH 3 DAYS 2020 09:52 AM  
 
 
Radiology:  
 
Line/Insert Date:        
 
Assessment:  
 
1. UTI--Serratia (2 biotypes) from culture and small amount of Enterococcus--now with Pseudomonas from culture of urine and blood 2. CHF/cardiomyopathy 3. ?aspiration event(s) 4. Renal insufficiency 5. Respiratory difficulties Objective: 1. Continue current therapy 2. Presence of LVAD in face of bacteremia may require longer course of therapy, or at least PO Rx for a time 3. Work-up any fever 4. Follow-up pending cultures and studies Jluis Pepe MD

## 2020-01-16 NOTE — PROGRESS NOTES
Problem: Falls - Risk of 
Goal: *Absence of Falls Description Document Ross Garcia Fall Risk and appropriate interventions in the flowsheet. Outcome: Progressing Towards Goal 
Note: Fall Risk Interventions: 
Mobility Interventions: Patient to call before getting OOB, PT Consult for mobility concerns Mentation Interventions: Door open when patient unattended, More frequent rounding Medication Interventions: Teach patient to arise slowly, Patient to call before getting OOB, Utilize gait belt for transfers/ambulation Elimination Interventions: Call light in reach, Patient to call for help with toileting needs History of Falls Interventions: Evaluate medications/consider consulting pharmacy, Investigate reason for fall, Room close to nurse's station Problem: Pain Goal: *Control of Pain Outcome: Progressing Towards Goal 
  
Problem: Pressure Injury - Risk of 
Goal: *Prevention of pressure injury Description Document Mich Scale and appropriate interventions in the flowsheet. Offload heels Turn approximately every 2 hours Outcome: Progressing Towards Goal 
Note: Pressure Injury Interventions: 
Sensory Interventions: Assess changes in LOC Moisture Interventions: Maintain skin hydration (lotion/cream) Activity Interventions: Increase time out of bed, Pressure redistribution bed/mattress(bed type), PT/OT evaluation Mobility Interventions: HOB 30 degrees or less, Pressure redistribution bed/mattress (bed type), PT/OT evaluation Nutrition Interventions: Document food/fluid/supplement intake Friction and Shear Interventions: Lift sheet, HOB 30 degrees or less Problem: Nutrition Deficit Goal: *Optimize nutritional status Outcome: Progressing Towards Goal 
  
Problem: LVAD Post-op Day 15 to Discharge Goal: Nutrition/Diet Outcome: Progressing Towards Goal 
Goal: Respiratory Outcome: Progressing Towards Goal 
Goal: Psychosocial 
Outcome: Progressing Towards Goal 
  
 
 0730: Bedside and Verbal shift change report given to [address] (oncoming nurse) by [facility] (offgoing nurse). Report included the following information SBAR, Kardex, Intake/Output, MAR, Recent Results, Med Rec Status and Cardiac Rhythm Paced.

## 2020-01-16 NOTE — PROGRESS NOTES
Problem: Self Care Deficits Care Plan (Adult) Goal: *Acute Goals and Plan of Care (Insert Text) Description FUNCTIONAL STATUS PRIOR TO ADMISSION: Patient was modified independent using a single point cane for functional mobility. Patient able to shower and dress himself. However, patient required assistance for household management from his wife. HOME SUPPORT: The patient lived alone with wife to provide assistance. Occupational Therapy Goals all updated 1/10/2020 1. Patient will perform upper body dressing including management of LVAD with min A within 7 day(s) 2. Patient will perform lower body dressing with RW CGA within 7 day(s). 3.  Patient will perform grooming standing with RW 2 mins with supervision/setup within 7 day(s). 4.  Patient will perform toilet transfers with RW minimum assistance within 7 day(s). 5.  Patient will perform all aspects of toileting with RW with moderate assistance within 7 day(s). 6.  Patient will perform gathering ADL items high and waist height 2/2 with RW supervision within 7 days. Continue all goals 10/30/19 post re-eval for Impella removal  
Initiated 10/28/2019 1. Patient will perform bathing with supervision/set-up within 7 day(s). 2.  Patient will perform lower body dressing with supervision/set-up within 7 day(s). 3.  Patient will perform grooming with modified independence within 7 day(s). 4.  Patient will perform toilet transfers with modified independence within 7 day(s). 5.  Patient will perform all aspects of toileting with supervision/set-up within 7 day(s). 6.  Patient will participate in upper extremity therapeutic exercise/activities with supervision/set-up for 5 minutes within 7 day(s). 7.  Patient will utilize energy conservation techniques during functional activities with verbal cues within 7 day(s). Outcome: Progressing Towards Goal 
 OCCUPATIONAL THERAPY TREATMENT Patient: Montserrat Bryant (75 y.o. male) Date: 1/16/2020 Diagnosis: Acute decompensated heart failure ([facility] [address] [I50.9] <principal problem not specified> Procedure(s) (LRB): LEFT BRACHIAL THROMBECTOMY (Left) 52 Days Post-Op Precautions: Fall Chart, occupational therapy assessment, plan of care, and goals were reviewed. ASSESSMENT Patient continues with skilled OT services and is progressing towards goals. Patient continues with c/o dizziness with noted hypotension, however MAPs remained between 72 and 70 throughout session (unable to obtain MAP standing). Patient also continues to be tremulous and has impaired balance, generalized weakness, decreased endurance, decreased activity tolerance, and visual perceptual deficits. Patient completed 6/6 BUE cardiac exercises seated unsupported EOB today x CGA. Patient also completed LVAD switchover with MIN A today, verbalizing understanding of all terminology however requiring assist for power lead management. Patient was MOD A for bed > chair transfer today and was setup for self-feeding tasks following OT intervention. Continue to recommend IPR to maximize patient safety and independence with ADL transfers and tasks. Patient is verbalizing and is demonstrating understanding of mindful-based movements (\"move in the tube\") principles of keeping UEs proximal to ribcage to prevent lateral pull on the sternum during load-bearing activities with visual, verbal, and tactile cues required for compliance. Current Level of Function Impacting Discharge (ADLs): MIN A UB dressing, MOD A LB ADLs (bathing and dressing), MOD A toilet transfers Other factors to consider for discharge: LVAD HM III 
    
 
PLAN : 
Patient continues to benefit from skilled intervention to address the above impairments. Continue treatment per established plan of care. to address goals. Recommend with staff: OOB x 3 to chair; BUE cardiac exercises; LVAD switchover Recommend next OT session: standing tolerance for grooming tasks Recommendation for discharge: (in order for the patient to meet his/her long term goals) Therapy 3 hours per day 5-7 days per week This discharge recommendation: 
Has been made in collaboration with the attending provider and/or case management IF patient discharges home will need the following DME: TBD SUBJECTIVE:  
Patient stated I feel way better today than I did yesterday.  OBJECTIVE DATA SUMMARY:  
Cognitive/Behavioral Status: 
Neurologic State: Alert Orientation Level: Oriented X4 Cognition: Appropriate for age attention/concentration Perception: Appears intact Perseveration: No perseveration noted Safety/Judgement: Decreased insight into deficits; Decreased awareness of need for safety;Decreased awareness of need for assistance Functional Mobility and Transfers for ADLs: 
Bed Mobility: 
Supine to Sit: Minimum assistance;Assist x1;Additional time Transfers: 
Sit to Stand: Assist x1;Additional time;Minimum assistance Bed to Chair: Moderate assistance;Assist x1;Additional time Balance: 
Sitting: Impaired; Without support Sitting - Static: Good (unsupported) Sitting - Dynamic: Fair (occasional) Standing: Impaired; With support Standing - Static: Fair;Constant support Standing - Dynamic : Poor;Constant support ADL Intervention: 
  
 
  
 
  
 
  
 
Upper Body Dressing Assistance Dressing Assistance: Minimum assistance(for management of LVAD) Patient demonstrated switchover from power module to battery in prep for ADL routine with MIN A. Demonstrated the following tasks:  
 Independent Verbal cues Physical assistance Comments Check PM & SC  x Ensure  clipped onto stabilization belt x Remove front (green lighted) batteries from , place back batteries into front slots x Check batteries  x Position batteries into battery clips x Don srinivaster vest  x Disconnect power leads   x Insert power lead into battery clip  x [place] batteries in holster  x Secure batteries with Velcro and clips  x Cognitive Retraining Safety/Judgement: Decreased insight into deficits; Decreased awareness of need for safety;Decreased awareness of need for assistance Patient instructed and educated on mindful movement principles based on Move in The Tube concept to include maintaining bilateral elbows close to rib cage when performing any load-bearing activity such as getting in/out of bed, pushing up from a chair, opening a door, or lifting a box. Patient was given a handout with diagrams of each correct/incorrect method of performing each of the above tasks. Patient instructed and encouraged to mobilize bilateral upper extremities outside the tube when doing any non-load bearing activity such as washing hair/body, brushing teeth, retrieving clothing items, or scratching your back. Increase activity tolerance for home, work, and sexual intercourse by pacing self with increasing the arm exercises, sitting duration, frequency OOB, walking, standing, and ADLs. Instructed and indicated understanding of s/s of too much activity, how to respond to s/s safely. Therapeutic Exercises:  
Patient instructed on the benefits and demonstrated cardiac exercises while seated unsupported with Contact guard assistance. Instructed and indicated understanding on how to progress reps, sets against gravity, pacing through progressive muscle strengthening standing based on surgeon clearance for more weight in prep for basic and instrumental ADLs. Instruction on the use of household items in place of weights as needed. CARDIAC EXERCISE Sets Reps Active  Active Assist   
Passive  Self ROM Comments Shoulder flexion  1  5   [x]                            []                             []                             []                               
 Shoulder abduction  1  5  [x]                             []                             []                             []                               
Scapular elevation  1  5  [x]                             []                              []                             []                               
Scapular retraction  1  5  [x]                             []                             []                             []                               
Trunk rotation  1  5  [x]                             []                             []                             []                               
Trunk sidebending  1  5  [x]                             []                              []                             []                                     
 
Activity Tolerance:  
Fair Please refer to the flowsheet for vital signs taken during this treatment. After treatment patient left in no apparent distress:  
Sitting in chair, Call bell within reach, and Caregiver / family present COMMUNICATION/COLLABORATION:  
The patients plan of care was discussed with: Physical Therapist and Registered Nurse Girish Cho Time Calculation: 38 mins

## 2020-01-16 NOTE — PROGRESS NOTES
Transitions of Care Plan: 
   - Patient continues medical management & therapy; s/p LVAD implant; LFT brachial thrombectomy; neutropenia -  Disposition: [facility] when medically stable 1315 - CM spoke with Eve Edwards (p: [phone]) at [address] [place] [address] re update on patient's transition of care plan. CM advised Aidan Guadalupe that patient will likely not discharge for the next week; patient remains at [facility] until medically stable for inpatient rehab. CM will provide updated therapy notes as patient progresses over the next several days. CM will continue to follow.  
 
Boris Piedra, MPH

## 2020-01-16 NOTE — PROGRESS NOTES
[facility] VAD/Heart Failure Progress Note Admit Date: 10/25/2019 POD:  52 Days Post-Op Procedure:  Procedure(s): LEFT BRACHIAL THROMBECTOMY Subjective: Dyspnea, fatigue, and weakness; sleepy; reviewed CT scan - no outflow obstruction Objective:  
Vitals: 
Blood pressure 91/72, pulse 85, temperature 98.1 °F (36.7 °C), resp. rate 18, height 6' 2\" (1.88 m), weight 171 lb 4.8 oz (77.7 kg), SpO2 94 %. Temp (24hrs), Av.8 °F (36.6 °C), Min:96.6 °F (35.9 °C), Max:98.1 °F (36.7 °C) Hemodynamics: 
 CO: CO (l/min): 5.8 l/min CI: CI (l/min/m2): 2.8 l/min/m2 CVP: CVP (mmHg): 4 mmHg (19 1645) SVR: SVR (dyne*sec)/cm5: 1080 (dyne*sec)/cm5 (19 1200) PAP Systolic: PAP Systolic: 34 ( 4319) PAP Diastolic: PAP Diastolic: 13 (56//78 1439) PVR:   
 SV02: SVO2 (%): 67 % (19 1300) SCV02: SCVO2 (%): 75 % (10/29/19 1900) VAD Interrogation: LVAD (Heartmate) Pump Speed (RPM): [id number] Pump Flow (LPM): 5.6 Chatter in Lines: No 
PI (Pulsitility Index): 6.2 Power: 6.1 MAP: 82  Test: No 
Back Up  at Bedside & Labeled: Yes Power Module Test: No 
Driveline Site Care: No 
Driveline Dressing: Clean, Dry, and Intact EKG/Rhythm:   
 
Extubation Date / Time:  
 
CT Output:  
 
Ventilator: 
Ventilator Volumes Vt Set (ml): 550 ml (19 08) Vt Exhaled (Machine Breath) (ml): 639 ml (19 0826) Vt Spont (ml): 437 ml (19 1035) Ve Observed (l/min): 7.25 l/min (19 1035) Oxygen Therapy: 
Oxygen Therapy O2 Sat (%): 94 % (20 1141) Pulse via Oximetry: 80 beats per minute (01/15/20 2259) O2 Device: Nasal cannula (20) PEEP/CPAP (cm H2O): 1 cm H20 (20 07) O2 Flow Rate (L/min): 2 l/min (20) FIO2 (%): 21 % (20 0905) CXR: 
 
Admission Weight: Last Weight Weight: 192 lb 10.9 oz (87.4 kg) Weight: 171 lb 4.8 oz (77.7 kg) Intake / [place] / Drain: Current Shift: No intake/output data recorded. Last 24 hrs.:  
 
Intake/Output Summary (Last 24 hours) at 1/16/2020 1453 Last data filed at 1/16/2020 0762 Gross per 24 hour Intake 730 ml Output 1500 ml Net -770 ml No results for input(s): CPK, CKMB, TROIQ in the last 72 hours. Recent Labs  
  01/16/20 
0457 01/15/20 
0446 01/14/20 
2365 01/14/20 0448 * 131*  --  130*  
K 3.2* 3.2*  --  3.2*  
CO2 25 25  --  27 BUN 23* 24*  --  22* CREA 1.02 1.04  --  1.07  
* 91  --  89  
MG 1.9 2.0  --  2.0 WBC 2.4* 2.4* 2.8* 2.5* HGB 9.5* 9.0* 9.1* 9.2* HCT 30.5* 29.2* 29.4* 29.6* PLT 83* 90* 94* 93* Recent Labs  
  01/16/20 
0457 01/15/20 
0446 01/14/20 
0448 INR 1.4* 1.5* 1.7* PTP 14.0* 15.0* 17.2* APTT 41.7* 42.4* 41.8* No lab exists for component: PBNP Current Facility-Administered Medications:  
  potassium chloride SR (KLOR-CON 10) tablet 20 mEq, 20 mEq, Oral, BID, Levi, Shana B, NP, 20 mEq at 01/16/20 1320   warfarin (COUMADIN) tablet 6 mg, 6 mg, Oral, ONCE, Levi, Shana B, NP 
  senna-docusate (PERICOLACE) 8.6-50 mg per tablet 1 Tab, 1 Tab, Oral, PRN, Soledad Sosa MD, 1 Tab at 01/14/20 1305   collagenase (SANTYL) 250 unit/gram ointment, , Topical, DAILY, Levi, Shana B, NP 
  fludrocortisone (FLORINEF) tablet 0.1 mg, 0.1 mg, Oral, DAILY, Levi, Shana B, NP, 0.1 mg at 01/16/20 8226   cholecalciferol (VITAMIN D3) (1000 Units /25 mcg) tablet 2 Tab, 2,000 Units, Oral, DAILY, Polliard, Ira Romeo, NP, 2 Tab at 01/16/20 0943 
  venlafaxine-SR (EFFEXOR-XR) capsule 150 mg, 150 mg, Oral, DAILY WITH BREAKFAST, Polliard, Hamp Pallas T, NP, 150 mg at 01/16/20 0943 
  midodrine (PROAMITINE) tablet 5 mg, 5 mg, Oral, TID WITH MEALS, Polliard, Hamp Pallas T, NP, 5 mg at 01/16/20 1320   hydrocortisone (CORTAID) 1 % cream, , Topical, TID PRN, Alhaji Altman MD 
  thiamine HCL (B-1) tablet 100 mg, 100 mg, Oral, DAILY, Leann Sheriff, Carleton Meigs, NP, 100 mg at 01/16/20 3964   levoFLOXacin (LEVAQUIN) 750 mg in D5W IVPB, 750 mg, IntraVENous, Q24H, Juan C Olivares MD, Last Rate: 100 mL/hr at 01/16/20 1432, 750 mg at 01/16/20 1432   cefepime (MAXIPIME) 2 g in 0.9% sodium chloride (MBP/ADV) 100 mL, 2 g, IntraVENous, Q8H, Harley Ratliff MD, Last Rate: 200 mL/hr at 01/16/20 0938, 2 g at 01/16/20 0830   oxybutynin (DITROPAN) tablet 5 mg, 5 mg, Oral, Q6H PRN, Shana Sims NP, 5 mg at 01/01/20 1204   magnesium oxide (MAG-OX) tablet 800 mg, 800 mg, Oral, BID, Jessica Arciniega NP, 800 mg at 01/16/20 8591   lidocaine (URO-JET/ GLYDO) 2 % jelly, , Urethral, PRN, Mounika Altman MD 
  epoetin yvonne-epbx (RETACRIT) injection 20,000 Units, 20,000 Units, SubCUTAneous, Q MON, WED & Madeline Izaguirre MD, 20,000 Units at 01/15/20 2229   albuterol-ipratropium (DUO-NEB) 2.5 MG-0.5 MG/3 ML, 3 mL, Nebulization, Q6H PRN, Stefano Gaytan MD, 3 mL at 12/25/19 1407   therapeutic multivitamin (THERAGRAN) tablet 1 Tab, 1 Tab, Oral, DAILY, Shana Sims NP, 1 Tab at 01/16/20 8512   alteplase (CATHFLO) 1 mg in sterile water (preservative free) 1 mL injection, 1 mg, InterCATHeter, PRN, Mounika Altman MD, 1 mg at 01/08/20 0919 
  finasteride (PROSCAR) tablet 5 mg, 5 mg, Oral, DAILY, Gege Evans MD, 5 mg at 01/16/20 0945   diphenhydrAMINE (BENADRYL) capsule 25 mg, 25 mg, Oral, QHS PRN, Chioma Herrera NP, 25 mg at 01/05/20 2325   octreotide (SANDOSTATIN) injection 25 mcg, 25 mcg, SubCUTAneous, TID, Marta Herrera NP, 25 mcg at 01/16/20 4706   pantoprazole (PROTONIX) tablet 40 mg, 40 mg, Oral, ACB, Chioma Herrera NP, 40 mg at 01/16/20 5943   arformoterol (BROVANA) neb solution 15 mcg, 15 mcg, Nebulization, BID RT, 15 mcg at 01/16/20 0722 **AND** budesonide (PULMICORT) 500 mcg/2 ml nebulizer suspension, 500 mcg, Nebulization, BID RT, Marta Herrera NP, 500 mcg at 01/16/20 0119   lactobac ac& pc-s.therm-b.anim (TIAN Q/RISAQUAD), 1 Cap, Oral, DAILY, Estuardo Lee MD, 1 Cap at 01/16/20 8975   oxyCODONE IR (ROXICODONE) tablet 5 mg, 5 mg, Oral, Q6H PRN, Padmaja Reina MD, 5 mg at 12/30/19 0405   tamsulosin (FLOMAX) capsule 0.4 mg, 0.4 mg, Oral, DAILY, Logan Kincaid MD, 0.4 mg at 01/16/20 5161   Warfarin MD/NP dosing, , Other, PRN, Mounika Altman MD 
  sodium chloride (NS) flush 5-40 mL, 5-40 mL, IntraVENous, Q8H, Harshal Traylor MD, 10 mL at 01/16/20 1427 
  sodium chloride (NS) flush 5-40 mL, 5-40 mL, IntraVENous, PRN, Padmaja Reina MD, 10 mL at 01/02/20 2342   acetaminophen (TYLENOL) tablet 650 mg, 650 mg, Oral, Q6H PRN, Padmaja Reina MD, 650 mg at 01/12/20 2033 
  naloxone [facility]) injection 0.4 mg, 0.4 mg, IntraVENous, PRN, Padmaja Reina MD 
  ondansetron [facility]) injection 4 mg, 4 mg, IntraVENous, Q4H PRN, Padmaja Reina MD, 4 mg at 01/16/20 1427 
  sucralfate (CARAFATE) tablet 1 g, 1 g, Oral, AC&HS, Padmaja Reina MD, 1 g at 01/16/20 1320 
  influenza vaccine 2019-20 (6 mos+)(PF) (FLUARIX/FLULAVAL/FLUZONE QUAD) injection 0.5 mL, 0.5 mL, IntraMUSCular, PRIOR TO DISCHARGE, Aubree Altman MD 
  hydrALAZINE (APRESOLINE) 20 mg/mL injection 20 mg, 20 mg, IntraVENous, Q6H PRN, Shana Sims NP, 20 mg at 12/10/19 0541 A/P 
  
S/P LVAD - good flows Need for anti-coagulation - coumadin DM - insulin RV dysfunction - milrinone, diuretics  
  
Risk of morbidity and mortality - high Medical decision making - high complexity Signed By: Cristhian Crain MD

## 2020-01-16 NOTE — PROGRESS NOTES
[address] [facility] [facility] [place], [place] Inpatient Progress Note Patient name: Tammie Vidal Patient : 1950 Patient MRN: [id number] Attending MD: Maryann Thomas MD 
Date of service: 20 Chief Complaint:  
Pilaretienne Newby azucena LVAD implant 
  
HPI: Sona Kiser is a 71y.o. year old pleasant white male with a history of HTN, HLD, JOSE, CAD s/p cardiac arrest VFib s/p CABG () c/b sternal would infection and sternectomy, ischemic cardiomyopathy LVEF 15-20%, s/p ICD and with LBBB. He was admitted with acute on chronic systolic heart failure with massive volume overload > 20 lbs, in the setting of atrial fibrillation s/p failed DCCV and underwent DCCV and RHC on .  S/p BiVICD on 19 with Dr. Amy Sanchez. He was discharged home home on IV milrinone on 19. Jared Diane has been followed closely by Dr. Arnoldo Deleon and the [facility]. 
  
Mr. Kiser was admitted for acute on chronic systolic heart failure. He underwent implantation of Impella 5.0 due to CS and has completed his LVAD evaluation. Jared Diane meets criteria for implant of HM3 as DT. He was NYHA Class IV prior to implant of Impella 5.0, has LVEF < 15%, was intolerant of GDMT due to symptomatic hypotension and renal dysfunction. He remains dependent on temporary MCS support. RHC  revealed compensated hemodynamics on Impella. His renal function has improved dramatically with improvement in his hemodynamics. He is not a suitable transplant candidate due to single kidney, sternectomy, debility, and frailty. He was reviewed by Cort[facility] Marr and felt to be a high risk heart transplant candidate due to multiple co-morbidities as well as the afore mentioned conditions.  He remained in the CCU and underwent a HM 3 implant as DT on . He was weaned off of pressors and transferred to [place] [address] where he continues to undergo PT/OT and hemodynamic optimization.   
  
24Hr Events: WBC remains decreased No SOB Using CPAP 
 
 Procedure:  Procedure(s): REMOVAL TEMPORARY LEFT VENTRICULAR ASSIST DEVICE, IMPLANTATION OF LEFT VENTRICULAR ASSIST DEVICE PERMANENT (VAD), ECC, JACQUE, EPI AORTIC US BY DR Christian Gaming.    
POD:  59 
  
Impression / Plan:  
Heart Failure Status: NYHA Class IV, improved to NYHA Class III Chronic systolic heart failure due to ICM, NYHA Class IV, EF < 15%, cardiogenic shock bridged with Impella 5.0 support to HM 3 implant S/p Impella removal and LVAD implant 11/18/19 RPMs stable at 6700 Multiple PI events on interrogation - improved Stop Sildenafil due to leukopenia Intolerant of BB due to RV failure Intolerant of ACEi/ARB/ARNI/AA due to JUAN J on CKD 3 Intolerant of spironolactone d/t IVVD Continue midodrine 5 mg po TID for orthostatic hypotension No diuretics today Strict I/O, daily weights Continue LVAD education Dressing change frequency three times weekly, sutures removed 12/26/19 Ramp test this week, set speed increased to 6700 Chest CTA- no outflow graft occlusion Bacteremia - Pseudomonas Blood cultures (12/31/19) 2/4 bottles +Pseudomonas & 2/4 bottles GNRs Repeat BC NGTD On IV Cefepime and Levaquin Follow procalcitonin and lactic acid levels - both improving Repeat sputum- few yeast, normal soren Repeat UA negative Orthostatic Hypotension Midodrine 5 mg PO TID Cont Florinef 0.1 mg to daily Stim test unremarkable Leukopenia Check Manual Diff Peripheral smear- pending Hematology recommendations appreciated- likely nutritional or medications Stopped allopurinol, revatio, klonopin. Generalized myoclonus Unchanged Appreciate Neurology input Discontinued Keppra due to dizziness Head CT negative for acute process EEG suggestive of mild generalized encephalopathic process, possibly related to underlying structural brain injury vs metabolic abnormality Monitor closely Off Digoxin  
  
Anticoagulation for LVAD, INR goal 1.5-2 Goal decreased d/t persistent hematuria No ASA d/t hematuria INR 1.4 Coumadin - 6mg tonight Acute Respiratory Failure post op Improved Wean FiO2 Pulmonary hygiene Cont home CPAP- must use while sleeping Acute on CKD 3, atrophic left kidney Improved Appreciate Nephrology assistance- signed off Watch Cr, stable Avoid nephrotoxins, trend labs  
  
CAD s/p CABG Off ASA d/t hematuria No BB d/t RV failure Hold statin due to recent hepatic failure LHC performed 11/13 - low likelihood of benefit from revascularization  
  
Hx of VF arrest s/p BiV-ICD No recent shocks Keep K > 4 and Mg > 2  
Mag ox 800 mg po BID- watch for diarrhea ICD reprogrammed to reduce diaphragmatic stimulation PAF Off Dig due to lethargy and tremors No BB due to RV failure Repeat TFTs WNL 
  
Hx of gout Uric acid WNL Acute blood loss anemia Improved Likely due to hematuria Cont octreotide 25 mcg SQ TID Fecal occult 12/14 negative, positive on 12/17 Appreciate urology recommendations 
  
Urinary retention, c/b serratia UTI and hematuria Resolved Repeat UA with cx negative 1/13/20 Cystoscopy performed 11/13 shows catheter induced cystitis Pseudomonas aeruginosa positive UA culture (12/31/19) - on Cefepmine and levaquin Severe Acute on Chronic Protein Calorie Malnutrition Eating better Prealbumin weekly - 17 on 1/13 Appreciate Nutritionist assistance Diet as tolerated, encourage food from home, protein shakes Intolerant of mirtazapine d/t tremors, confusion Cont MVI add thiamine 100mg daily COPD Appreciate Pulmonology assistance Continue nebs PRN   
  
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  
  
Hx of sternal wound infection, sternectomy Sternum closed post op- monitor Delayed skin healing mid portion of sternotomy - evaluated by Dr. Kalen Olmedo, will continue IV abx and wet to dry dressings. Will likely require sternal wire after ~3 months from op date Vocal cord paralysis Improved ENT recs appreciated Speech therapy following - appreciate input Anxiety Stop klonopin due to leukopenia Monitor Depression Cont Effexor to 150 mg PO qAM  
Monitor rhythm strips for prolonged QTc 
  
Debility Continue PT/OT Bladder spasms Received pyridium 100mg x4 doses Oxybutynin 5mg Q6hr prn  
  
Ppx Protonix PT/OT  
+daily BM  
PICC placed 11/22/19 Do Not Disturb at night from 10p-6a 
  
Dispo: 
Remain in [facility] at this time Will need IP rehab. Not ready for discharge at this time Patient seen and examined. Data and note reviewed. I have discussed and agree with the plans as noted. 71year old male with a history of ICM s/p LVAD as DT whose post op course has been complicated by RV failure, orthostatic hypotension, and pseudomonas bacteremia. He continues to have orthostatic hypotension despite midodrine and florinef. Stim test negative. Encourage use of CPAP at night and IS during the day. Suspect autonomic dysfunction from prolonged bedrest - encourage patient to sit in the chair as much as possible. Thank you for allowing us to participate in your patient's care. Mounika Holley MD, Chas Moss Chief of Cardiology, [facility] Medical Director Calos Rueda [address] [place] [address], [address] Office [phone] Fax [phone] LVAD INTERROGATION: 
Device interrogated in person Device function normal, normal flow, multiple PI events LVAD Pump Speed (RPM): [id number] Pump Flow (LPM): 5.6 MAP: 70 
PI (Pulsitility Index): 6.2 Power: 6.1  Test: No 
Back Up  at Bedside & Labeled: Yes Power Module Test: No 
Driveline Site Care: No 
Driveline Dressing: Clean, Dry, and Intact Outpatient: No 
 MAP in Therapeutic Range (Outpatient): Yes Testing Alarms Reviewed: Yes 
Back up SC speed: 6700 Back up Low Speed Limit: 6000 Emergency Equipment with Patient?: Yes Emergency procedures reviewed?: Yes Drive line site inspected?: No 
Drive line intergrity inspected?: No 
Drive line dressing changed?: No 
 
PHYSICAL EXAM: 
Visit Vitals BP 91/72 (BP 1 Location: Left arm, BP Patient Position: At rest) Pulse 85 Temp 98.1 °F (36.7 °C) Resp 18 Ht 6' 2\" (1.88 m) Wt 171 lb 4.8 oz (77.7 kg) SpO2 94% BMI 21.99 kg/m² Physical Exam  
Constitutional: He is oriented to person, place, and time. He appears well-developed. He appears cachectic. No distress. HENT:  
Head: Normocephalic. Neck: Normal range of motion. Neck supple. No JVD present. Cardiovascular: Normal rate, regular rhythm and normal heart sounds. + VAD hum Pulmonary/Chest: Effort normal and breath sounds normal. No tachypnea. No respiratory distress. Abdominal: Soft. Bowel sounds are normal. He exhibits no distension. Musculoskeletal: Normal range of motion. General: No edema. Neurological: He is oriented to person, place, and time. More lethargic today Skin: Skin is warm and dry. Psychiatric: He has a normal mood and affect. His speech is normal and behavior is normal.  
~1 cm x 1 cm x 0.25 cm area of delayed closure on sternotomy. Site clean, no erythema or drainage noted. Subcutaneous tissue noted. Nursing note and vitals reviewed. REVIEW OF SYSTEMS: 
Review of Systems Constitutional: Positive for malaise/fatigue. Negative for chills and fever. HENT: Positive for hearing loss. Negative for nosebleeds. Eyes: Negative. Respiratory: Negative for cough and shortness of breath. Mild dyspnea Cardiovascular: Negative for chest pain, palpitations, orthopnea and leg swelling. Orthostatic Gastrointestinal: Negative for heartburn, nausea and vomiting. Genitourinary: Negative for dysuria and hematuria. Musculoskeletal: Negative. Neurological: Positive for dizziness and weakness. Negative for headaches. Mild dizziness - improving Endo/Heme/Allergies: Does not bruise/bleed easily. PAST MEDICAL HISTORY: 
Past Medical History:  
Diagnosis Date  Degenerative disc disease, lumbar  Heart failure ([facility] [address]  High cholesterol  Hypertension  Paroxysmal atrial fibrillation ([facility] [address] 4/2/2019  Spinal stenosis PAST SURGICAL HISTORY: 
Past Surgical History:  
Procedure Laterality Date  COLONOSCOPY Left 11/11/2019 COLONOSCOPY at bedside performed by Mike Cook MD at [address]  HX CORONARY ARTERY BYPASS GRAFT    
 triple  HX HERNIA REPAIR    
 HX IMPLANTABLE CARDIOVERTER DEFIBRILLATOR  GA CARDIOVERSION ELECTIVE ARRHYTHMIA EXTERNAL N/A 6/10/2019 EP CARDIOVERSION performed by Lavinia Keene MD at [address] CATH LAB  GA CARDIOVERSION ELECTIVE ARRHYTHMIA EXTERNAL N/A 6/18/2019 EP CARDIOVERSION performed by Fabian Ng MD at [address] CATH LAB  GA INSJ/RPLCMT PERM DFB W/TRNSVNS LDS 1/DUAL CHMBR N/A 6/21/2019 INSERT ICD BIV MULTI performed by Renee Garcia MD at [address] CATH LAB  GA TCAT IMPL WRLS P-ART PRS SNR L-T HEMODYN MNTR N/A 9/18/2019 IMPLANT HEART FAILURE MONITORING DEVICE performed by Dina Arizmendi MD at [address] CATH LAB FAMILY HISTORY: 
Family History Problem Relation Age of Onset  Lung Disease Mother  Hypertension Mother Canseco Arthritis-osteo Mother  Heart Disease Mother  Heart Disease Father  Diabetes Father SOCIAL HISTORY: 
Social History Socioeconomic History  Marital status:  Spouse name: Not on file  Number of children: Not on file  Years of education: Not on file  Highest education level: Not on file Tobacco Use  Smoking status: Former Smoker Last attempt to quit: 2010 Years since quittin.1  Smokeless tobacco: Never Used Substance and Sexual Activity  Alcohol use: Not Currently Comment: rarely  Drug use: Never Other Topics Concern LABORATORY RESULTS: 
  
Labs Latest Ref Rng & Units 2020 1/15/2020 1/15/2020 2020 2020 2020 2020 WBC 4.1 - 11.1 K/uL 2. 4(L) - 2. 4(L) 2. 8(L) 2. 5(L) 4.1 5.2  
RBC 4.10 - 5.70 M/uL 3.22(L) - 3.04(L) 3.05(L) 3.08(L) 2.91(L) 2.94(L) Hemoglobin 12.1 - 17.0 g/dL 9.5(L) - 9. 0(L) 9.1(L) 9.2(L) 8.6(L) 8.7(L) Hematocrit 36.6 - 50.3 % 30. 5(L) - 29. 2(L) 29. 4(L) 29. 6(L) 28. 4(L) 28. 7(L) MCV 80.0 - 99.0 FL 94.7 - 96.1 96.4 96.1 97.6 97.6 Platelets 291 - 356 K/uL 83(L) - 90(L) 94(L) 93(L) 114(L) 114(L) Lymphocytes 12 - 49 % - - - 8(L) - - - Monocytes 5 - 13 % - - - 3(L) - - - Eosinophils 0 - 7 % - - - 0 - - - Basophils 0 - 1 % - - - 0 - - - Bands 0 - 6 % - - - 0 - - - Blasts 0 % - - - 0 - - - Albumin 3.5 - 5.0 g/dL 2. 0(L) - 2. 1(L) - 2. 2(L) 2. 3(L) 2. 3(L) Calcium 8.5 - 10.1 MG/DL 8. 1(L) - 7. 9(L) - 8. 3(L) 8.6 8.8 SGOT 15 - 37 U/L 57(H) - 64(H) - 39(H) 22 18 Glucose 65 - 100 mg/dL 114(H) - 91 - 89 99 99 BUN 6 - 20 MG/DL 23(H) - 24(H) - 22(H) 18 23(H) Creatinine 0.70 - 1.30 MG/DL 1.02 - 1.04 - 1.07 0.90 0.90 Sodium 136 - 145 mmol/L 132(L) - 131(L) - 130(L) 132(L) 136 Potassium 3.5 - 5.1 mmol/L 3.2(L) - 3. 2(L) - 3. 2(L) 3.5 3.8 TSH 0.36 - 3.74 uIU/mL - - - - - - -  
LDH 85 - 241 U/L 253(H) 224 236 - 223 196 192 Some recent data might be hidden Lab Results Component Value Date/Time TSH 2.30 2019 03:29 AM  
 TSH 2.40 10/25/2019 07:39 PM  
 TSH 2.45 2019 04:16 AM  
 
 
ALLERGY: 
No Known Allergies CURRENT MEDICATIONS: 
Current Facility-Administered Medications Medication Dose Route Frequency  sildenafil (pulm.hypertension) (REVATIO) tablet 10 mg  10 mg Oral TID  potassium chloride SR (KLOR-CON 10) tablet 20 mEq  20 mEq Oral BID  warfarin (COUMADIN) tablet 6 mg  6 mg Oral ONCE  
 senna-docusate (PERICOLACE) 8.6-50 mg per tablet 1 Tab  1 Tab Oral PRN  
 collagenase (SANTYL) 250 unit/gram ointment   Topical DAILY  fludrocortisone (FLORINEF) tablet 0.1 mg  0.1 mg Oral DAILY  cholecalciferol (VITAMIN D3) (1000 Units /25 mcg) tablet 2 Tab  2,000 Units Oral DAILY  venlafaxine-SR (EFFEXOR-XR) capsule 150 mg  150 mg Oral DAILY WITH BREAKFAST  midodrine (PROAMITINE) tablet 5 mg  5 mg Oral TID WITH MEALS  
 hydrocortisone (CORTAID) 1 % cream   Topical TID PRN  thiamine HCL (B-1) tablet 100 mg  100 mg Oral DAILY  levoFLOXacin (LEVAQUIN) 750 mg in D5W IVPB  750 mg IntraVENous Q24H  cefepime (MAXIPIME) 2 g in 0.9% sodium chloride (MBP/ADV) 100 mL  2 g IntraVENous Q8H  
 oxybutynin (DITROPAN) tablet 5 mg  5 mg Oral Q6H PRN  
 magnesium oxide (MAG-OX) tablet 800 mg  800 mg Oral BID  lidocaine (URO-JET/ GLYDO) 2 % jelly   Urethral PRN  
 epoetin yvonne-epbx (RETACRIT) injection 20,000 Units  20,000 Units SubCUTAneous Q MON, WED & FRI  albuterol-ipratropium (DUO-NEB) 2.5 MG-0.5 MG/3 ML  3 mL Nebulization Q6H PRN  therapeutic multivitamin (THERAGRAN) tablet 1 Tab  1 Tab Oral DAILY  alteplase (CATHFLO) 1 mg in sterile water (preservative free) 1 mL injection  1 mg InterCATHeter PRN  finasteride (PROSCAR) tablet 5 mg  5 mg Oral DAILY  diphenhydrAMINE (BENADRYL) capsule 25 mg  25 mg Oral QHS PRN  
 octreotide (SANDOSTATIN) injection 25 mcg  25 mcg SubCUTAneous TID  pantoprazole (PROTONIX) tablet 40 mg  40 mg Oral ACB  arformoterol (BROVANA) neb solution 15 mcg  15 mcg Nebulization BID RT And  
 budesonide (PULMICORT) 500 mcg/2 ml nebulizer suspension  500 mcg Nebulization BID RT  
 lactobac ac& pc-s.therm-b.anim (TIAN Q/RISAQUAD)  1 Cap Oral DAILY  oxyCODONE IR (ROXICODONE) tablet 5 mg  5 mg Oral Q6H PRN  
  tamsulosin (FLOMAX) capsule 0.4 mg  0.4 mg Oral DAILY  Warfarin MD/NP dosing   Other PRN  
 sodium chloride (NS) flush 5-40 mL  5-40 mL IntraVENous Q8H  
 sodium chloride (NS) flush 5-40 mL  5-40 mL IntraVENous PRN  
 acetaminophen (TYLENOL) tablet 650 mg  650 mg Oral Q6H PRN  
 naloxone (NARCAN) injection 0.4 mg  0.4 mg IntraVENous PRN  
 ondansetron (ZOFRAN) injection 4 mg  4 mg IntraVENous Q4H PRN  
 sucralfate (CARAFATE) tablet 1 g  1 g Oral AC&HS  influenza vaccine 2019-20 (6 mos+)(PF) (FLUARIX/FLULAVAL/FLUZONE QUAD) injection 0.5 mL  0.5 mL IntraMUSCular PRIOR TO DISCHARGE  hydrALAZINE (APRESOLINE) 20 mg/mL injection 20 mg  20 mg IntraVENous Q6H PRN Thank you for allowing me to participate in this patient's care. TIERRA Mahoney [address] [place] [address], [address] Phone: [phone] Fax: [phone]

## 2020-01-16 NOTE — PROGRESS NOTES
[facility] [place] [facility] 
[address], [address] W: [phone]  F: [phone] Reason for Consult:  
Priyanka Arora is a 71 y.o. male who is seen in consultation at the request of Paulo Aguilera NP for evaluation of pancytopenia Treatment History: · EGD 11/11/19 normal 
· Colonoscopy 11/11/19 with moderate diverticulosis in the descending and sigmoid colon but was otherwise normal 
· Epoetin 20,000 units MWF History of Present Illness: The patient is a very pleasant [de-identified] y.o. male who was first seen by Dr. Levi Peacock at the end of October for thrombocytopenia, iron deficiency anemia, and mediastinal lymphadenopathy. The plan was for a PET scan after discharge as well as a colonoscopy once patient is well enough and IV iron. Patient had an EGD and colonoscopy 11/11/19. EGD was normal. Colonoscopy showed moderate diverticulosis in the descending and sigmoid colon but was otherwise normal. Patient previously had gross hematuria which was believed to be the cause of his blood loss anemia. Patient remains in the hospital and now has neutropenia, so hematology was re-consulted. SinceI  last saw patient, patient completed his LVAD evaluation and had an implant placed on 11/18/19. He is hemodynamically stable and remains in the hospital for PT/OT. Patient reports he is doing fair. He remains weak. He has been working with PT/OT but activity is limited due to orthostatic hypotension. He denies CP or SOB. He denies hemoptysis, hematemesis, melena, hematuria or epistaxis. 1/16/2020 The patient's blood work looks good. There does not appear to be a problem with iron B12 or folate. Copper level is still pending. Patient is on a number of drugs that could affect the white count. It would be important to eliminate as many of those that we can safely. Past Medical History:  
Diagnosis Date  Degenerative disc disease, lumbar  Heart failure ([facility] [address]  High cholesterol  Hypertension  Paroxysmal atrial fibrillation ([facility] [address] 2019  Spinal stenosis Past Surgical History:  
Procedure Laterality Date  COLONOSCOPY Left 2019 COLONOSCOPY at bedside performed by Jamia Vieiar MD at [address]  HX CORONARY ARTERY BYPASS GRAFT    
 triple  HX HERNIA REPAIR    
 HX IMPLANTABLE CARDIOVERTER DEFIBRILLATOR  MA CARDIOVERSION ELECTIVE ARRHYTHMIA EXTERNAL N/A 6/10/2019 EP CARDIOVERSION performed by Aura Stone MD at [address] CATH LAB  MA CARDIOVERSION ELECTIVE ARRHYTHMIA EXTERNAL N/A 2019 EP CARDIOVERSION performed by Kristine Bowers MD at [address] CATH LAB  MA INSJ/RPLCMT PERM DFB W/TRNSVNS LDS 1/DUAL CHMBR N/A 2019 INSERT ICD BIV MULTI performed by Malia Florian MD at [address] CATH LAB  MA TCAT IMPL WRLS P-ART PRS SNR L-T HEMODYN MNTR N/A 2019 IMPLANT HEART FAILURE MONITORING DEVICE performed by Sara Solomon MD at [address] CATH LAB Social History Tobacco Use  Smoking status: Former Smoker Last attempt to quit: 2010 Years since quittin.1  Smokeless tobacco: Never Used Substance Use Topics  Alcohol use: Not Currently Comment: rarely Family History Problem Relation Age of Onset  Lung Disease Mother  Hypertension Mother [facility] Arthritis-osteo Mother  Heart Disease Mother  Heart Disease Father  Diabetes Father Current Facility-Administered Medications Medication Dose Route Frequency  senna-docusate (PERICOLACE) 8.6-50 mg per tablet 1 Tab  1 Tab Oral PRN  
 collagenase (SANTYL) 250 unit/gram ointment   Topical DAILY  fludrocortisone (FLORINEF) tablet 0.1 mg  0.1 mg Oral DAILY  cholecalciferol (VITAMIN D3) (1000 Units /25 mcg) tablet 2 Tab  2,000 Units Oral DAILY  clonazePAM (KlonoPIN) tablet 0.5 mg  0.5 mg Oral QHS  venlafaxine-SR (EFFEXOR-XR) capsule 150 mg  150 mg Oral DAILY WITH BREAKFAST  midodrine (PROAMITINE) tablet 5 mg  5 mg Oral TID WITH MEALS  
 hydrocortisone (CORTAID) 1 % cream   Topical TID PRN  
 sildenafil (pulm.hypertension) (REVATIO) tablet 20 mg  20 mg Oral TID  thiamine HCL (B-1) tablet 100 mg  100 mg Oral DAILY  levoFLOXacin (LEVAQUIN) 750 mg in D5W IVPB  750 mg IntraVENous Q24H  cefepime (MAXIPIME) 2 g in 0.9% sodium chloride (MBP/ADV) 100 mL  2 g IntraVENous Q8H  
 oxybutynin (DITROPAN) tablet 5 mg  5 mg Oral Q6H PRN  
 magnesium oxide (MAG-OX) tablet 800 mg  800 mg Oral BID  lidocaine (URO-JET/ GLYDO) 2 % jelly   Urethral PRN  
 epoetin yvonne-epbx (RETACRIT) injection 20,000 Units  20,000 Units SubCUTAneous Q MON, WED & FRI  albuterol-ipratropium (DUO-NEB) 2.5 MG-0.5 MG/3 ML  3 mL Nebulization Q6H PRN  therapeutic multivitamin (THERAGRAN) tablet 1 Tab  1 Tab Oral DAILY  alteplase (CATHFLO) 1 mg in sterile water (preservative free) 1 mL injection  1 mg InterCATHeter PRN  finasteride (PROSCAR) tablet 5 mg  5 mg Oral DAILY  diphenhydrAMINE (BENADRYL) capsule 25 mg  25 mg Oral QHS PRN  
 octreotide (SANDOSTATIN) injection 25 mcg  25 mcg SubCUTAneous TID  pantoprazole (PROTONIX) tablet 40 mg  40 mg Oral ACB  allopurinol (ZYLOPRIM) tablet 100 mg  100 mg Oral DAILY  arformoterol (BROVANA) neb solution 15 mcg  15 mcg Nebulization BID RT And  
 budesonide (PULMICORT) 500 mcg/2 ml nebulizer suspension  500 mcg Nebulization BID RT  
 lactobac ac& pc-s.therm-b.anim (TIAN Q/RISAQUAD)  1 Cap Oral DAILY  oxyCODONE IR (ROXICODONE) tablet 5 mg  5 mg Oral Q6H PRN  
 tamsulosin (FLOMAX) capsule 0.4 mg  0.4 mg Oral DAILY  Warfarin MD/NP dosing   Other PRN  
 sodium chloride (NS) flush 5-40 mL  5-40 mL IntraVENous Q8H  
 sodium chloride (NS) flush 5-40 mL  5-40 mL IntraVENous PRN  
 acetaminophen (TYLENOL) tablet 650 mg  650 mg Oral Q6H PRN  
  naloxone (NARCAN) injection 0.4 mg  0.4 mg IntraVENous PRN  
 ondansetron (ZOFRAN) injection 4 mg  4 mg IntraVENous Q4H PRN  
 sucralfate (CARAFATE) tablet 1 g  1 g Oral AC&HS  influenza vaccine 2019-20 (6 mos+)(PF) (FLUARIX/FLULAVAL/FLUZONE QUAD) injection 0.5 mL  0.5 mL IntraMUSCular PRIOR TO DISCHARGE  hydrALAZINE (APRESOLINE) 20 mg/mL injection 20 mg  20 mg IntraVENous Q6H PRN No Known Allergies Review of Systems: A complete review of systems was obtained, negative except as described above. Physical Exam:  
 
Visit Vitals BP 91/72 (BP 1 Location: Left arm, BP Patient Position: At rest) Pulse 70 Temp 96.6 °F (35.9 °C) Resp 18 Ht 6' 2\" (1.88 m) Wt 171 lb 4.8 oz (77.7 kg) SpO2 99% BMI 21.99 kg/m² ECOG PS: 2 General: No distress Eyes: PERRL, anicteric sclerae HENT: Atraumatic, OP clear Neck: Supple Respiratory: CTAB, normal respiratory effort GI: Soft, nontender, nondistended, no masses MS: In bed, moves all extremities Skin: Warm and dry Psych: Alert, oriented, appropriate affect, normal judgment/insight Results:  
 
Lab Results Component Value Date/Time WBC 2.4 (L) 01/16/2020 04:57 AM  
 HGB 9.5 (L) 01/16/2020 04:57 AM  
 HCT 30.5 (L) 01/16/2020 04:57 AM  
 PLATELET 83 (L) 51/28/7115 04:57 AM  
 MCV 94.7 01/16/2020 04:57 AM  
 ABS. NEUTROPHILS 2.5 01/14/2020 09:52 AM  
 
Lab Results Component Value Date/Time Sodium 132 (L) 01/16/2020 04:57 AM  
 Potassium 3.2 (L) 01/16/2020 04:57 AM  
 Chloride 101 01/16/2020 04:57 AM  
 CO2 25 01/16/2020 04:57 AM  
 Glucose 114 (H) 01/16/2020 04:57 AM  
 BUN 23 (H) 01/16/2020 04:57 AM  
 Creatinine 1.02 01/16/2020 04:57 AM  
 GFR est AA >60 01/16/2020 04:57 AM  
 GFR est non-AA >60 01/16/2020 04:57 AM  
 Calcium 8.1 (L) 01/16/2020 04:57 AM  
 Glucose (POC) 137 (H) 01/07/2020 11:16 AM  
 
Lab Results Component Value Date/Time  Bilirubin, total 0.4 01/16/2020 04:57 AM  
 ALT (SGPT) 44 01/16/2020 04:57 AM  
 AST (SGOT) 57 (H) 01/16/2020 04:57 AM  
 Alk. phosphatase 121 (H) 01/16/2020 04:57 AM  
 Protein, total 5.8 (L) 01/16/2020 04:57 AM  
 Albumin 2.0 (L) 01/16/2020 04:57 AM  
 Globulin 3.8 01/16/2020 04:57 AM  
 
CT C/A/P 10/25/19 IMPRESSION:  
1. Low-grade nonspecific mediastinal adenopathy. 2. Trace right pleural effusion. 3. Emphysema. 4. Left renal atrophy. 5. Colonic diverticula. 6. Moderate bladder distention with diverticula CTA CHEST 1/14/20 IMPRESSION:  
1. No occlusion or stenosis of the LVAD outflow tubing. 2. Nonocclusive radiodense foreign body in the posterior basilar left lower 
lobar artery. 3. Decreased pulmonary edema. Bilateral lower lobe atelectasis more likely than 
pneumonia. 4. Unchanged lymphadenopathy. Other incidental findings are described above. 1/15/20 Peripheral blood, smear:  
Pancytopenia without dysmyelopoiesis, dyserythropoiesis or platelet aggregation. Records reviewed and summarized above. Radiology report(s) reviewed above. Assessment:  
1) Neutropenia Peripheral blood smear demonstrates pancytopenia without dysmyelopoiesis, dyserythropoiesis or platelet aggregation. Patient does not have any signs or symptoms of infection May be secondary to nutritional deficiency, so will check vitamin B12, folate, and copper levels The following drugs have been associated with neutropenia Allopurinol, Maxipime, Clonopin, Levaquin, Apresoline,and Revatio. So the value of these drugs needs to be considered on an individual basis for this patient. 2) Anemia with iron deficiency EGD and colonoscopy 11/11/19 did not show any obvious cause of bleeding It was believed that patient's iron deficiency anemia was secondary to gross hematuria. Will recheck iron studies now that patient's hematuria has resolved. The iron studies were normal as was B12 and folate. Patient is receiving Epoetin MWF 
 
3) Thrombocytopenia HIT panel checked when thrombocytopenia first started 10/31/19 and was negative May be secondary to nutritional deficiency 4) Mediastinal lymphadenopathy Unchanged on CTA Chest 1/14/20 Concerning for bronchogenic carcinoma. Patient will need a PET scan after discharge 5) CHF S/p LVAD 11/18/19 Patient on Coumadin for anticoagulation for LVAD with a INR goal 1.5-2 Management per cardiology and cardiac surgery 6) CAD s/p CABG Management per cardiology and cardiac surgery 7) CKD 3, atrophic left kidney Likely contributing to anemia Patient is on Epoetin MWF 
 
8) COPD 
 
9) Orthostatic hypotension Patient on midodrine and Florinef Management per cardiology 10) Malnutrition Likely contributing to pancytopenia RD is following for nutritional needs Plan: · CBC daily · labs: copper pending · Recommend PRBC transfusion for Hgb < 7.0 g/dL · Recommend PLT transfusion for PLT < 20 K or active bleeding · PET scan once patient is discharged · Evaluate the potential benefits of the drugs that have been associated with neutropenia. Will follow Call if questions I appreciate the opportunity to participate in Mr. Sona Kiser's care.

## 2020-01-17 NOTE — PROGRESS NOTES
Problem: Self Care Deficits Care Plan (Adult) Goal: *Acute Goals and Plan of Care (Insert Text) Description FUNCTIONAL STATUS PRIOR TO ADMISSION: Patient was modified independent using a single point cane for functional mobility. Patient able to shower and dress himself. However, patient required assistance for household management from his wife. HOME SUPPORT: The patient lived alone with wife to provide assistance. Occupational Therapy Goals all updated/continued as follows 1/17/2020 1. Patient will perform upper body dressing including management of LVAD with supervision/setup within 7 day(s) 2. Patient will perform lower body dressing with RW CGA within 7 day(s). -continue goal (MOD A simulated 1/17) 3. Patient will perform grooming standing with RW 2 mins with supervision/setup within 7 day(s). -continue goal (CGA 1/17) 4. Patient will perform toilet transfers with RW supervision/setup using LRAD within 7 day(s). 5.  Patient will perform all aspects of toileting with RW with minimal assistance within 7 day(s). 6.  Patient will perform gathering ADL items high and waist height 2/2 with RW supervision within 7 days. -continue goal (CGA 1/17) Occupational Therapy Goals all updated 1/10/2020 1. Patient will perform upper body dressing including management of LVAD with min A within 7 day(s) 2. Patient will perform lower body dressing with RW CGA within 7 day(s). 3.  Patient will perform grooming standing with RW 2 mins with supervision/setup within 7 day(s). 4.  Patient will perform toilet transfers with RW minimum assistance within 7 day(s). 5.  Patient will perform all aspects of toileting with RW with moderate assistance within 7 day(s). 6.  Patient will perform gathering ADL items high and waist height 2/2 with RW supervision within 7 days. Continue all goals 10/30/19 post re-eval for Impella removal  
Initiated 10/28/2019 1.  Patient will perform bathing with supervision/set-up within 7 day(s). 2.  Patient will perform lower body dressing with supervision/set-up within 7 day(s). 3.  Patient will perform grooming with modified independence within 7 day(s). 4.  Patient will perform toilet transfers with modified independence within 7 day(s). 5.  Patient will perform all aspects of toileting with supervision/set-up within 7 day(s). 6.  Patient will participate in upper extremity therapeutic exercise/activities with supervision/set-up for 5 minutes within 7 day(s). 7.  Patient will utilize energy conservation techniques during functional activities with verbal cues within 7 day(s). Outcome: Progressing Towards Goal 
 OCCUPATIONAL THERAPY TREATMENT/WEEKLY RE-EVALUATION Patient: Verona Cyr (75 y.o. male) Date: 1/17/2020 Diagnosis: [facility] [I50.9] <principal problem not specified> Procedure(s) (LRB): LEFT BRACHIAL THROMBECTOMY (Left) 53 Days Post-Op Precautions: Fall Chart, occupational therapy assessment, plan of care, and goals were reviewed. ASSESSMENT Based on the objective data described below, the patient continues to present with generalized weakness, decreased endurance, decreased activity tolerance, impaired balance, impaired coordination, and with c/o dizziness and fatigue throughout functional tasks. Patient donned socks x setup in bed today and completed item retrieval for LVAD switchover x MIN A x 2 using walker. Patient completed LVAD switchover x MIN A with A for donning vest and A for one power lead. Patient also performed 6/6 BUE cardiac exercises seated and with minimal verbal cues for \"move in the tube\" protocol today. Continue to recommend IPR to maximize patient safety and independence with ADL transfers and tasks.   
 
Current Level of Function Impacting Discharge (ADLs): MOD A LB ADLs overall (due to bilateral hand on walker for management of waist of pants and ataxic movements; MOD A-MAX A toileting tasks) Other factors to consider for discharge: LVAD HM III 
    
 
PLAN : 
Goals have been updated based on progression since last assessment. Patient continues to benefit from skilled intervention to address the above impairments. Continue to follow patient 5x/week to address goals. Recommend with staff: OOB x 3 in chair for meals, BUE cardiac exercises Recommend next OT session: LB ADL training; zip ties on LVAD vest for A with FM coordination and visual perceptual deficits Recommendation for discharge: (in order for the patient to meet his/her long term goals) Therapy 3 hours per day 5-7 days per week This discharge recommendation: 
Has been made in collaboration with the attending provider and/or case management Equipment recommendations for successful discharge (if) home: TBD SUBJECTIVE:  
Patient stated My MAP was 102 this morning.  OBJECTIVE DATA SUMMARY:  
Cognitive/Behavioral Status: 
Neurologic State: Alert Orientation Level: Oriented X4 Cognition: Appropriate for age attention/concentration Perception: Appears intact Perseveration: No perseveration noted Safety/Judgement: Decreased insight into deficits; Decreased awareness of need for safety;Decreased awareness of need for assistance Functional Mobility and Transfers for ADLs: 
Bed Mobility: 
Supine to Sit: Assist x1;Minimum assistance; Additional time Transfers: 
Sit to Stand: Assist x1;Minimum assistance; Adaptive equipment Balance: 
Sitting: Impaired; Without support Sitting - Static: Good (unsupported) Sitting - Dynamic: Good (unsupported); Fair (occasional) Standing: Impaired; With support Standing - Static: Fair;Constant support Standing - Dynamic : Fair;Poor;Constant support ADL Intervention: 
  
Patient demonstrated switchover from power module to battery in prep for ADL routine with SENAIT MARTINEZ Demonstrated the following tasks:  
 Independent Verbal cues Physical assistance Comments Check PM & SC x Ensure  clipped onto stabilization belt  x Remove front (green lighted) batteries from , place back batteries into front slots  x Check batteries x Position batteries into battery clips  x Don holster vest   x Disconnect power leads   x Insert power lead into battery clip  x [employer] batteries in holster  x Secure batteries with Velcro and clips   x Needs assistance for zipper management Upper Body Dressing Assistance Dressing Assistance: Minimum assistance(for LVAD management) Lower Body Dressing Assistance Pants With Elastic Waist: Moderate assistance(infer 2* balance) Socks: Set-up(in bed) Cognitive Retraining Safety/Judgement: Decreased insight into deficits; Decreased awareness of need for safety;Decreased awareness of need for assistance Activity Tolerance:  
Fair and requires rest breaks Please refer to the flowsheet for vital signs taken during this treatment. After treatment patient left in no apparent distress:  
Sitting in chair and Call bell within reach COMMUNICATION/COLLABORATION:  
The patients plan of care was discussed with: Physical Therapist and Registered Nurse Patrizia Signs Time Calculation: 41 mins

## 2020-01-17 NOTE — PROGRESS NOTES
[facility] VAD/Heart Failure Progress Note Admit Date: 10/25/2019 POD:  53 Days Post-Op Procedure:  Procedure(s): LEFT BRACHIAL THROMBECTOMY Subjective: Dyspnea, fatigue, and weakness; working on PT/OT Objective:  
Vitals: 
Blood pressure 91/72, pulse 88, temperature 98.2 °F (36.8 °C), resp. rate 18, height 6' 2\" (1.88 m), weight 171 lb 4.8 oz (77.7 kg), SpO2 97 %. Temp (24hrs), Av.9 °F (36.6 °C), Min:96.6 °F (35.9 °C), Max:98.7 °F (37.1 °C) Hemodynamics: 
 CO: CO (l/min): 5.8 l/min CI: CI (l/min/m2): 2.8 l/min/m2 CVP: CVP (mmHg): 4 mmHg (19 1645) SVR: SVR (dyne*sec)/cm5: 1080 (dyne*sec)/cm5 (19 1200) PAP Systolic: PAP Systolic: 34 (64/97 8520) PAP Diastolic: PAP Diastolic: 13 (41//34 8194) PVR:   
 SV02: SVO2 (%): 67 % (19 1300) SCV02: SCVO2 (%): 75 % (10/29/19 1900) VAD Interrogation: LVAD (Heartmate) Pump Speed (RPM): [id number] Pump Flow (LPM): 6.3 Chatter in Lines: No 
PI (Pulsitility Index): 2.5 Power: 6.4 MAP: 84  Test: No 
Back Up  at Bedside & Labeled: Yes Power Module Test: No 
Driveline Site Care: No 
Driveline Dressing: Clean, Dry, and Intact EKG/Rhythm:   
 
Extubation Date / Time:  
 
CT Output:  
 
Ventilator: 
Ventilator Volumes Vt Set (ml): 550 ml (19 08) Vt Exhaled (Machine Breath) (ml): 639 ml (19 0826) Vt Spont (ml): 437 ml (19 1035) Ve Observed (l/min): 7.25 l/min (19 1035) Oxygen Therapy: 
Oxygen Therapy O2 Sat (%): 97 % (20) Pulse via Oximetry: 80 beats per minute (01/15/20 2259) O2 Device: CPAP nasal (20) PEEP/CPAP (cm H2O): 1 cm H20 (20 07) O2 Flow Rate (L/min): 1 l/min (20 040) FIO2 (%): 21 % (20 0905) CXR: 
 
Admission Weight: Last Weight Weight: 192 lb 10.9 oz (87.4 kg) Weight: 171 lb 4.8 oz (77.7 kg) Intake / Output / Drain: 
Current Shift: 1 - 700 In: 160 [P.O.:160] Out: 1700 [Urine:1700] Last 24 hrs.:  
 
Intake/Output Summary (Last 24 hours) at 1/17/2020 2636 Last data filed at 1/17/2020 2227 Gross per 24 hour Intake 160 ml Output 1700 ml Net -1540 ml No results for input(s): CPK, CKMB, TROIQ in the last 72 hours. Recent Labs  
  01/16/20 
0457 01/15/20 
0446 01/14/20 
3477 * 131*  --   
K 3.2* 3.2*  --   
CO2 25 25  --   
BUN 23* 24*  --   
CREA 1.02 1.04  --   
* 91  --   
MG 1.9 2.0  --   
WBC 2.4* 2.4* 2.8* HGB 9.5* 9.0* 9.1*  
HCT 30.5* 29.2* 29.4* PLT 83* 90* 94* Recent Labs  
  01/16/20 
0457 01/15/20 
0446 INR 1.4* 1.5* PTP 14.0* 15.0* APTT 41.7* 42.4* No lab exists for component: PBNP Current Facility-Administered Medications:  
  potassium chloride SR (KLOR-CON 10) tablet 20 mEq, 20 mEq, Oral, BID, Levi, Shana B, NP, 20 mEq at 01/16/20 1814 
  senna-docusate (PERICOLACE) 8.6-50 mg per tablet 1 Tab, 1 Tab, Oral, PRN, Christy Aranda MD, 1 Tab at 01/14/20 1305   collagenase (SANTYL) 250 unit/gram ointment, , Topical, DAILY, Levi, Shana B, NP 
  fludrocortisone (FLORINEF) tablet 0.1 mg, 0.1 mg, Oral, DAILY, Levi, Shana B, NP, 0.1 mg at 01/16/20 6019   cholecalciferol (VITAMIN D3) (1000 Units /25 mcg) tablet 2 Tab, 2,000 Units, Oral, DAILY, Genesis Herrera NP, 2 Tab at 01/16/20 0943 
  venlafaxine-SR (EFFEXOR-XR) capsule 150 mg, 150 mg, Oral, DAILY WITH BREAKFAST, Maddi Herrera November JOHN, NP, 150 mg at 01/16/20 0943 
  midodrine (PROAMITINE) tablet 5 mg, 5 mg, Oral, TID WITH MEALS, Maddi Herrera November T, NP, 5 mg at 01/16/20 1813   hydrocortisone (CORTAID) 1 % cream, , Topical, TID PRN, Jayshree Altman MD 
  thiamine HCL (B-1) tablet 100 mg, 100 mg, Oral, DAILY, Albert Woods NP, 100 mg at 01/16/20 3137   levoFLOXacin (LEVAQUIN) 750 mg in D5W IVPB, 750 mg, IntraVENous, Q24H, Juan C Olivares MD, Last Rate: 100 mL/hr at 01/16/20 1432, 750 mg at 01/16/20 1432   cefepime (MAXIPIME) 2 g in 0.9% sodium chloride (MBP/ADV) 100 mL, 2 g, IntraVENous, Q8H, Juan C Olivares MD, Last Rate: 200 mL/hr at 01/17/20 0015, 2 g at 01/17/20 0015   oxybutynin (DITROPAN) tablet 5 mg, 5 mg, Oral, Q6H PRN, Alexi Simsin B, NP, 5 mg at 01/01/20 1204   magnesium oxide (MAG-OX) tablet 800 mg, 800 mg, Oral, BID, Jacinta Pleasants E, NP, 800 mg at 01/16/20 1813   lidocaine (URO-JET/ GLYDO) 2 % jelly, , Urethral, PRN, Mounika Altman MD 
  epoetin yvonne-epbx (RETACRIT) injection 20,000 Units, 20,000 Units, SubCUTAneous, Q MON, Milagro Pierson MD, 20,000 Units at 01/15/20 2229   albuterol-ipratropium (DUO-NEB) 2.5 MG-0.5 MG/3 ML, 3 mL, Nebulization, Q6H PRN, Amauri Wagner MD, 3 mL at 12/25/19 1407   therapeutic multivitamin (THERAGRAN) tablet 1 Tab, 1 Tab, Oral, DAILY, Alexi Simsin B, NP, 1 Tab at 01/16/20 8261   alteplase (CATHFLO) 1 mg in sterile water (preservative free) 1 mL injection, 1 mg, InterCATHeter, PRN, Mounika Altman MD, 1 mg at 01/08/20 0919 
  finasteride (PROSCAR) tablet 5 mg, 5 mg, Oral, DAILY, Ana Bryant MD, 5 mg at 01/16/20 0945   diphenhydrAMINE (BENADRYL) capsule 25 mg, 25 mg, Oral, QHS PRN, Joe Herrera, NP, 25 mg at 01/05/20 2325   octreotide (SANDOSTATIN) injection 25 mcg, 25 mcg, SubCUTAneous, TID, Polliard, Marliss Siemens, NP, 25 mcg at 01/16/20 2259   pantoprazole (PROTONIX) tablet 40 mg, 40 mg, Oral, ACB, Polliard, Dellindsey Breed T, NP, 40 mg at 01/16/20 7822   arformoterol (BROVANA) neb solution 15 mcg, 15 mcg, Nebulization, BID RT, 15 mcg at 01/16/20 0722 **AND** budesonide (PULMICORT) 500 mcg/2 ml nebulizer suspension, 500 mcg, Nebulization, BID RT, Sharon, Layne Siemens, NP, 500 mcg at 01/16/20 0273   lactobac ac& pc-s.therm-b.anim (TIAN Q/RISAQUAD), 1 Cap, Oral, DAILY, Miguel Francis MD, 1 Cap at 01/16/20 1437   oxyCODONE IR (ROXICODONE) tablet 5 mg, 5 mg, Oral, Q6H PRN, Ada Mejia, Dipika Egan MD, 5 mg at 12/30/19 0405   tamsulosin (FLOMAX) capsule 0.4 mg, 0.4 mg, Oral, DAILY, Logan Kincaid MD, 0.4 mg at 01/16/20 1091   Warfarin MD/NP dosing, , Other, PRN, Mounika Altman MD 
  sodium chloride (NS) flush 5-40 mL, 5-40 mL, IntraVENous, Q8H, Carina Katz MD, 10 mL at 01/17/20 7210   sodium chloride (NS) flush 5-40 mL, 5-40 mL, IntraVENous, PRN, Carina Katz MD, 10 mL at 01/02/20 2342   acetaminophen (TYLENOL) tablet 650 mg, 650 mg, Oral, Q6H PRN, Carina Katz MD, 650 mg at 01/12/20 2033 
  naloxone [facility]) injection 0.4 mg, 0.4 mg, IntraVENous, PRN, Carina Katz MD 
  ondansetron [facility]) injection 4 mg, 4 mg, IntraVENous, Q4H PRN, Carina Katz MD, 4 mg at 01/16/20 1427 
  sucralfate (CARAFATE) tablet 1 g, 1 g, Oral, AC&HS, Carina Katz MD, 1 g at 01/16/20 2300 
  influenza vaccine 2019-20 (6 mos+)(PF) (FLUARIX/FLULAVAL/FLUZONE QUAD) injection 0.5 mL, 0.5 mL, IntraMUSCular, PRIOR TO DISCHARGE, Ezio Altman MD 
  hydrALAZINE (APRESOLINE) 20 mg/mL injection 20 mg, 20 mg, IntraVENous, Q6H PRN, Shana Sims NP, 20 mg at 12/10/19 0541 
 
  
A/P 
  
S/P LVAD - good flows Need for anti-coagulation - coumadin DM - insulin RV dysfunction - milrinone, diuretics  
  
Risk of morbidity and mortality - high Medical decision making - high complexity Signed By: Grazyna Hyman MD

## 2020-01-17 NOTE — PROGRESS NOTES
Problem: Falls - Risk of 
Goal: *Absence of Falls Description Document Rich Zazueta Fall Risk and appropriate interventions in the flowsheet. Outcome: Progressing Towards Goal 
Note: Fall Risk Interventions: 
Mobility Interventions: Patient to call before getting OOB Mentation Interventions: Room close to nurse's station Medication Interventions: Patient to call before getting OOB Elimination Interventions: Call light in reach History of Falls Interventions: Room close to nurse's station Problem: Patient Education: Go to Patient Education Activity Goal: Patient/Family Education Outcome: Progressing Towards Goal 
  
Problem: Pain Goal: *PALLIATIVE CARE:  Alleviation of Pain Outcome: Progressing Towards Goal 
  
Problem: Patient Education: Go to Patient Education Activity Goal: Patient/Family Education Outcome: Progressing Towards Goal 
  
Problem: Heart Failure: Discharge Outcomes Goal: *Demonstrates ability to perform prescribed activity without shortness of breath or discomfort Outcome: Progressing Towards Goal 
Goal: *Left ventricular function assessment completed prior to or during stay, or planned for post-discharge Outcome: Progressing Towards Goal 
Goal: *ACEI prescribed if LVEF less than 40% and no contraindications or ARB prescribed Outcome: Progressing Towards Goal 
Goal: *Verbalizes understanding and describes prescribed diet Outcome: Progressing Towards Goal 
Goal: *Verbalizes understanding/describes prescribed medications Outcome: Progressing Towards Goal 
Goal: *Describes available resources and support systems Description 
(eg: Home Health, Palliative Care, Advanced Medical Directive) Outcome: Progressing Towards Goal 
Goal: *Understands and describes signs and symptoms to report to providers(Stroke Metric) Outcome: Progressing Towards Goal 
Goal: *Describes importance of continuing daily weights and changes to report to physician Outcome: Progressing Towards Goal

## 2020-01-17 NOTE — PROGRESS NOTES
ID Progress Note 2020 Subjective: He is sitting up in a chair today. He offers no complaints at the current time. Objective: Antibiotics: 1. Levaquin 2. Cefepime 3. Rifampin 4. Fluconazole 5. Vancomycin 6. Cefazolin 7. Zosyn Vitals:  
Visit Vitals BP 91/72 (BP 1 Location: Left arm, BP Patient Position: At rest) Pulse 93 Temp 97.9 °F (36.6 °C) Resp 20 Ht 6' 2\" (1.88 m) Wt 79.3 kg (174 lb 13.2 oz) SpO2 97% BMI 22.45 kg/m² Tmax:  Temp (24hrs), Av.2 °F (36.8 °C), Min:97.9 °F (36.6 °C), Max:98.7 °F (37.1 °C) Exam:  Lungs: A few basilar rales Heart:  regular rate and rhythm Abdomen:  soft, non-tender. Bowel sounds normal. No masses,  no organomegaly Urine in neal is grossly clear Sternal wound is dressed and dry Labs:     
Recent Labs  
  20 
0550 20 
0457 01/15/20 
0446 WBC 2.5* 2.4* 2.4* HGB 9.5* 9.5* 9.0*  
PLT 78* 83* 90* BUN 23* 23* 24* CREA 0.97 1.02 1.04  
SGOT 38* 57* 64* * 121* 107 TBILI 0.5 0.4 0.4 Cultures: No results found for: [facility] Lab Results Component Value Date/Time Culture result: LIGHT YEAST (A) 2020 10:01 AM  
 Culture result: LIGHT NORMAL RESPIRATORY TIAN 2020 10:01 AM  
 Culture result: NO GROWTH 4 DAYS 2020 09:52 AM  
 
 
Radiology:  
 
Line/Insert Date:        
 
Assessment:  
 
1. UTI--Serratia (2 biotypes) from culture and small amount of Enterococcus--now with Pseudomonas from culture of urine and blood 2. CHF/cardiomyopathy 3. ?aspiration event(s) 4. Renal insufficiency 5. Respiratory difficulties Objective: 1. Continue current therapy 2. Presence of LVAD in face of bacteremia may require longer course of therapy, or at least PO Rx for a time 3. Work-up any fever 4. Follow-up pending cultures and studies 5. Group will see over the weekend if asked Lefty Rivers MD

## 2020-01-17 NOTE — PROGRESS NOTES
NUTRITION COMPLETE ASSESSMENT 
 
 
 
RECOMMENDATIONS:  
1. Continue Regular diet- encourage PO intakes. Adjusted ONS/snacks and breakfast meals. add daily MVI 2. Continue to encourage oral intake - family may bring foods from home 3. Changed ONS to Ensure Enlive for flavor preference of [place] Please document  Severe Acute on Chronic Protein Calorie Malnutrition in patient diagnoses. Patient meets criteria for as evidenced by:  
ASPEN Malnutrition Criteria Acute Illness, Chronic Illness, or Social/Enviornmental: Acute illness Energy Intake: <75% est energy req for > 7 days Weight Loss: Greater than 7.5% x 3 mos Body Fat Loss: Moderate Muscle Mass Loss: Moderate Fluid Accumulation: Moderate ASPEN Malnutrition Score - Acute Illness: 19 Acute Illness - Malnutrition Diagnosis: Severe malnutrition. Interventions/Plan:  
Food/Nutrient Delivery:  (-) Commercial supplement(see below)   (d/c marinol) Initiate enteral nutrition Assessment:  
Reason for Assessment: Reassessment Diet: regular + snack Supplements: Ensure Enlive 3x/day Nutritionally Significant Medications: [x] Reviewed & Includes: insulin, FloraQ, MagOx, Carafate, Flomax, MultiVit, Coumadin, Protonix,  
 
Meal intake:  
Patient Vitals for the past 168 hrs: 
 % Diet Eaten 01/15/20 1357 50 % 01/15/20 1100 0 % 01/15/20 1006 75 % 01/14/20 1116 75 % 01/14/20 0932 50 % 01/13/20 1840 0 % 01/13/20 1258 10 % 01/13/20 0915 5 % 01/12/20 1831 0 % 01/12/20 1529 15 % 01/12/20 1128 0 % 01/11/20 1837 100 % 01/11/20 1112 85 % 01/11/20 0900 100 % Subjective: Pt ate breakfast well. Usually done fine with breakfast. Had discussed getting lunch later, but this has not been happening. Spoke with kitchen in attempt to get later lunch and dinner times. Objective: 
Pt admitted for CHF. PMHx: HTN, CKD, chronic systolic heart failure, depression, others noted.  S/p removal of impella (placed 10/29) and LVAD placement on 11/18. Bacteremia with likely urinary source noted, abx rx. Lethargic today. Milrinone drip off, midodrine rx. Low sodium and chloride continue but stable. Renal following for JUAN J on CKD. Pt feeling a little better today. Over all pt had been doing well better with PO intake and appetite. Weekly PAB being checked but not indicator of nutrition status with pro-inflammatory condition of CHF. Do not recommend checking as a way to evaluate nutrition. Does however meet criteria for severe malnutrition with: (1) Severe wt loss over past 6 months (2) Severe muscle/fat wasting (3) Inadequate oral intake. Estimated Nutrition Needs:  
Kcals/day: 2045 Kcals/day(1887-2045kcal) Protein: 75 g(75-90g (1-1.2g/kg - CKD)) Fluid: 1887 ml(1ml/kcal) Based On: [place] Jeor(x 1.2-1.3) Weight Used: Actual wt(74.7kg) Pt expected to meet estimated nutrient needs:  [x]   Yes []  No [] Unable to predict at this time Nutrition Diagnosis: 1. Malnutrition related to CHF vs depression vs malignancy as evidenced by >10% weight loss x 6 months; severe muscle/fat wasting; consuming >70% needs orally 
- continues 2. Inadequate oral intake related to poor appetite as evidenced by less than 50% most meals consumed; only 1-2 supplements/day 
- improving 3. Swallowing difficulty(resolved) related to weakness with moderate pharyngeal dysphagia as evidenced by regular with thin liquids 
- resolved Goals:   
 Initiation of EN to meet 75% needs in 48hrs Monitoring & Evaluation: - Total energy intake, Liquid meal replacement, Enteral/parenteral nutrition intake - Weight/weight change Previous Nutrition Goals Met:   Progressing Previous Recommendations:    Yes 
 
Education & Discharge Needs: 
 [x] None Identified 
 [] Identified and addressed [x] Participated in care plan, discharge planning, and/or interdisciplinary rounds Cultural, Congregational and ethnic food preferences identified: None Skin Integrity: []Intact  [x]Other: sternal wound Edema: []None [x]Other: trace Last BM: 1/16 Food Allergies: [x]None []Other Diet Restrictions: Cultural/Rastafari Preference(s): None Anthropometrics:   
Weight Loss Metrics 1/17/2020 10/25/2019 10/25/2019 10/25/2019 9/30/2019 9/18/2019 9/16/2019 Today's Wt 174 lb 13.2 oz - 193 lb 6.4 oz - 199 lb 14.4 oz 196 lb 199 lb BMI - 22.45 kg/m2 - 24.83 kg/m2 25.67 kg/m2 25.16 kg/m2 25.55 kg/m2 Weight Source: Bed Height: 6' 2\" (188 cm), Body mass index is 22.45 kg/m². IBW : 86.2 kg (190 lb), % IBW (Calculated): 96.32 % 
 ,   
 
Labs:   
Lab Results Component Value Date/Time Sodium 131 (L) 01/17/2020 05:50 AM  
 Potassium 3.8 01/17/2020 05:50 AM  
 Chloride 100 01/17/2020 05:50 AM  
 CO2 25 01/17/2020 05:50 AM  
 Glucose 103 (H) 01/17/2020 05:50 AM  
 BUN 23 (H) 01/17/2020 05:50 AM  
 Creatinine 0.97 01/17/2020 05:50 AM  
 Calcium 8.4 (L) 01/17/2020 05:50 AM  
 Magnesium 1.6 01/17/2020 05:50 AM  
 Phosphorus 3.3 11/27/2019 04:18 AM  
 Albumin 2.2 (L) 01/17/2020 05:50 AM  
 
Lab Results Component Value Date/Time Hemoglobin A1c 6.6 (H) 10/25/2019 02:56 PM  
 
 
Gail Mcdermott, [address] [facility]

## 2020-01-17 NOTE — PROGRESS NOTES
0730 Bedside and Verbal shift change report given to Britta WILKES (oncoming nurse) by Ladan Zamora (offgoing nurse). Report included the following information SBAR, Kardex, Procedure Summary, Intake/Output, MAR, Recent Results, Med Rec Status and Cardiac Rhythm paced/afib.

## 2020-01-17 NOTE — PROGRESS NOTES
Problem: Mobility Impaired (Adult and Pediatric) Goal: *Acute Goals and Plan of Care (Insert Text) Description FUNCTIONAL STATUS PRIOR TO ADMISSION: Patient was modified independent using a single point cane for functional mobility. Patient reports an increasingly sedentary lifestyle 2* fatigue and SOB/dyspnea. Retired (so is his wife). Patient reports x 3 falls within the last couple of weeks. Patient is wearing either nasal cannula or CPAP at night. LVAD work-up has been initiated. HOME SUPPORT PRIOR TO ADMISSION: The patient lived with his wife, but did not require physical assistance. Physical Therapy Goals: 
 
Reassessed 1/17/2020 and goals upgraded as approporiate Goals upgraded as appropriate 1/07/2020- Goals appropriate 1/9/2020 on weekly reassessment 1. Patient will move from supine to sit and sit to supine, scoot up and down and roll side to side in bed with supervision within 7 days. 2.  Patient will perform sit to/from stand with minimal assistance x 1 within 7 days. - Upgrade to contact guard assist 1/17/2020 3. Patient will ambulate 50 feet with least restrictive assistive device and moderate assistance within 7 days. 4.  Stair goal to be formulated when appropriate. 5.  Patient will perform cardiac exercises per protocol with supervision within 7 days. 6.  Patient will verbally and functionally recall mindful movement principles (Move in the Tube) with minimal verbal cuing/reminding within 7 days. 7.  Patient will perform power exchange for power module to/from battery with minimal assistance within 7 days. - Upgrade to verbal cuing only 1/17/2020 Reassessed on 1/2/2020 and goals not met, carry-over. Reassessed on 12/26/2019, goals not met but remain appropriate, so carry over Weekly reassessment completed 12/19/2019 and goals downgraded as appropriate.   
1.  Patient will move from supine to sit and sit to supine, scoot up and down and roll side to side in bed with contact guard assistance within 7 days. 2.  Patient will perform sit to/from stand with minimal assistance x 1 within 7 days. 3.  Patient will ambulate 25 feet with least restrictive assistive device and moderate assistance within 7 days. 4.  Stair goal to be formulated when appropriate. 5.  Patient will perform cardiac exercises per protocol with supervision within 7 days. 6.  Patient will verbally and functionally recall mindful movement principles (Move in the Tube) with minimal verbal cuing/reminding within 7 days. 7.  Patient will perform power exchange for power module to/from battery with moderate assistance within 7 days. Re-evaluation completed 11/20/2019 and new goals formulated following LVAD implantation. Reviewed and cont 12/3/2019. Reviewed and continued 12/12/2019 1. Patient will move from supine to sit and sit to supine, scoot up and down and roll side to side in bed with minimal assistance/contact guard assist within 7 days. 2.  Patient will perform sit to/from stand with minimal assistance/contact guard assist within 7 days. 3.  Patient will ambulate 150 feet with least restrictive assistive device and minimal assistance/contact guard assist within 7 days. 4.  Patient will ascend/descend 4 stairs with handrail(s) with minimal assistance/contact guard assist within 7 days. 5.  Patient will perform cardiac exercises per protocol with supervision within 7 days. 6.  Patient will verbally and functionally recall 3/3 sternal precautions within 7 days. 7.  Patient will perform power exchange for power module to/from battery with moderate assistance  within 7 days. Initiated 10/27/2019; updated 11/15/2019 1. Patient will move from supine to sit and sit to supine, scoot up and down and roll side to side in bed with independence within 7 days. 2.  Patient will perform sit to/from stand with supervision/set-up within 7 days. 3.  Patient will ambulate 200 feet with least restrictive assistive device and supervision/set-up within 7 days. 4.  Patient will ascend/descend 4 stairs with  handrail(s) with supervision/set-up within 7 days for functional strengthening and community reintegration. Kecia Ramirez 5.  Patient will verbally and functionally recall 3/3 sternal precautions within 7 days in preparation for LVAD implantation. 6.  Patient will perform a mock power exchange for power module to/from battery with supervision/set-up within 7 days in preparation for LVAD implantation. 1.  Patient will move from supine to sit and sit to supine, scoot up and down and roll side to side in bed with independence within 7 days. 2.  Patient will perform sit to/from stand with supervision/set-up within 7 days. 3.  Patient will ambulate 150 feet with least restrictive assistive device and supervision/set-up within 7 days. 4.  Patient will ascend/descend 4 stairs with  handrail(s) with supervision/set-up within 7 days for functional strengthening and community reintegration. Kecia Ramirez 5.  Patient will verbally and functionally recall 3/3 sternal precautions within 7 days in preparation for LVAD implantation. 6.  Patient will perform a mock power exchange for power module to/from battery with supervision/set-up within 7 days in preparation for LVAD implantation. Outcome: Progressing Towards Goal 
  
PHYSICAL THERAPY TREATMENT: WEEKLY REASSESSMENT Patient: Priyanka Arora (75 y.o. male) Date: 1/17/2020 Primary Diagnosis: Acute decompensated heart failure ([facility] [address] [I50.9] Procedure(s) (LRB): LEFT BRACHIAL THROMBECTOMY (Left) 53 Days Post-Op Precautions: Fall, Sternal (Move in the Tube) ASSESSMENT Patient continues with skilled PT services and is slowly progressing towards goals.  Patient's participation in sessions has been limited by respiratory status, nausea, fever, ataxia, global debility, impaired cardiopulmonary tolerance, decreased functional endurance, and labile MAPs (typically symptomatic). Patient is highly motivated and agreeable to all tasks suggested by this therapist. Patient tolerated a 40+ minute session which included the following: functional reaching tasks in standing with displacement of 1 hand from a supportive surface (contact guard assist; objects weighed between 1-3 lbs), standing tolerance ~ 7 minutes, LVAD terminology review (fluctuating recall, both short-term and long term), LVAD switch-over from power-module to batteries (minimal assist 2* vision deficits and fine motor coordination), and short distance ambulation with rolling walker support and minimal assistance (frequent assist needed for walker management and for maintaining walker proximity). Continue to highly recommend discharge to rehab once medically stable; patient is working towards tolerating 3 hours of therapy per day. Patient's progression toward goals since last assessment: 2 goals upgraded Current Level of Function Impacting Discharge (mobility/balance): Min A x 2 + constant walker support for short-distance ambulation, LE instability/ataxia Functional Outcome Measure: The patient scored 7/28 on the Tinetti outcome measure which is indicative of a high fall risk. Other factors to consider for discharge: Complex hospital admission (83 days) PLAN : 
Goals have been updated based on progression since last assessment. Patient continues to benefit from skilled intervention to address the above impairments. Recommendations and Planned Interventions: bed mobility training, transfer training, gait training, therapeutic exercises, edema management/control, patient and family training/education, and therapeutic activities Frequency/Duration: Patient will be followed by physical therapy:  5 times a week to address goals.  
 
Recommendation for discharge: (in order for the patient to meet his/her long term goals) Therapy 3 hours per day 5-7 days per week This discharge recommendation: 
Has been made in collaboration with the attending provider and/or case management IF patient discharges home will need the following DME: to be determined (TBD) SUBJECTIVE:  
Patient stated I didn't get much sleep last night.  OBJECTIVE DATA SUMMARY:  
HISTORY:   
Past Medical History:  
Diagnosis Date Degenerative disc disease, lumbar Heart failure ([facility] [address] High cholesterol Hypertension Paroxysmal atrial fibrillation ([facility] [address] 4/2/2019 Spinal stenosis Past Surgical History:  
Procedure Laterality Date COLONOSCOPY Left 11/11/2019 COLONOSCOPY at bedside performed by Tisha Hood MD at [address] HX CORONARY ARTERY BYPASS GRAFT    
 triple HX HERNIA REPAIR    
 HX IMPLANTABLE CARDIOVERTER DEFIBRILLATOR    
 AL CARDIOVERSION ELECTIVE ARRHYTHMIA EXTERNAL N/A 6/10/2019 EP CARDIOVERSION performed by Albin Pichardo MD at [address] CATH LAB  
 AL CARDIOVERSION ELECTIVE ARRHYTHMIA EXTERNAL N/A 6/18/2019 EP CARDIOVERSION performed by Analisa Singh MD at [address] CATH LAB  
 AL INSJ/RPLCMT PERM DFB W/TRNSVNS LDS 1/DUAL [facility] N/A 6/21/2019 INSERT ICD BIV MULTI performed by Simin Fierro MD at [address] CATH LAB  
 AL TCAT IMPL WRLS P-ART PRS SNR L-T HEMODYN MNTR N/A 9/18/2019 IMPLANT HEART FAILURE MONITORING DEVICE performed [facility] Sud[facility]ars [facility] [address] CATH LAB Personal factors and/or comorbidities impacting plan of care: PMH Home Situation Home Environment: Private residence # Steps to Enter: 0 One/Two Story Residence: One story Living Alone: No 
Support Systems: Spouse/Significant Other/Partner Patient Expects to be Discharged to[de-identified] Unknown Current DME Used/Available at Home: Cane, straight, Walker, rolling, CPAP Tub or Shower Type: Shower EXAMINATION/PRESENTATION/DECISION MAKING:  
 Critical Behavior: 
Neurologic State: (P) Alert Orientation Level: (P) Oriented X4 Cognition: (P) Appropriate for age attention/concentration Safety/Judgement: (P) Decreased insight into deficits, Decreased awareness of need for safety, Decreased awareness of need for assistance Hearing: Auditory Auditory Impairment: None Vision:  
R cranial nerve 3 palsy Functional Mobility: 
Bed Mobility: 
  
Supine to Sit: Assist x1;Minimum assistance; Additional time Transfers: 
Sit to Stand: Assist x1;Minimum assistance; Adaptive equipment Stand to Sit: Assist x1;Minimum assistance; Adaptive equipment Balance:  
Sitting: Impaired; Without support Sitting - Static: Good (unsupported) Sitting - Dynamic: Good (unsupported); Fair (occasional) Standing: Impaired; With support Standing - Static: Fair;Constant support Standing - Dynamic : Fair;Poor;Constant support Ambulation/Gait Training: 
Distance (ft): 15 Feet (ft)(x 2) Assistive Device: Gait belt;Walker, rolling Ambulation - Level of Assistance: Assist x2;Minimal assistance; Adaptive equipment Gait Abnormalities: Ataxic;Decreased step clearance; Path deviations(Decr LE stability) Base of Support: Center of gravity altered Therapeutic Exercises:  
x 5 reps of 4/6 cardiac exercises Heel/toe raises 2 x 10 reps Functional Measure: 
Tinetti test: 
 
Sitting Balance: 1 Arises: 0 Attempts to Rise: 0 Immediate Standing Balance: 0 Standing Balance: 0 Nudged: 0 Eyes Closed: 0 Turn 360 Degrees - Continuous/Discontinuous: 0 Turn 360 Degrees - Steady/Unsteady: 0 Sitting Down: 1 Balance Score: 2 Balance total score Indication of Gait: 0 
R Step Length/Height: 1 L Step Length/Height: 1 
R Foot Clearance: 1 L Foot Clearance: 1 Step Symmetry: 1 Step Continuity: 0 Path: 0 Trunk: 0 Walking Time: 0 Gait Score: 5 Gait total score Total Score: 7/28 Overall total score Tinetti Tool Score Risk of Falls 
<19 = High Fall Risk 19-24 = Moderate Fall Risk 25-28 = Low Fall Risk Manolo MCCORD. Performance-Oriented Assessment of Mobility Problems in Elderly Patients. [facility] 66; [id number]. (Scoring Description: PT Bulletin Feb. 10, 1993) Older adults: Anthony Mtz et al, 2009; n = [address] [facility] elderly evaluated with ABC, XAVIER, ADL, and IADL) · Mean XAVIER score for males aged 69-68 years = 26.21(3.40) · Mean XAVIER score for females age 69-68 years = 25.16(4.30) · Mean XAVIER score for males over 80 years = 23.29(6.02) · Mean XAVIER score for females over 80 years = 17.20(8.32) Activity Tolerance:  
Fair, requires frequent rest breaks, and observed SOB with activity Please refer to the flowsheet for vital signs taken during this treatment. After treatment patient left in no apparent distress:  
Sitting in chair and Call bell within reach COMMUNICATION/EDUCATION:  
The patients plan of care was discussed with: Occupational Therapist, Registered Nurse, and . Fall prevention education was provided and the patient/caregiver indicated understanding., Patient/family have participated as able in goal setting and plan of care. , and Patient/family agree to work toward stated goals and plan of care. Thank you for this referral. 
Washington Coats, PT, DPT Time Calculation: 47 mins

## 2020-01-17 NOTE — PROGRESS NOTES
[facility] 
[address], [address] W: [phone]  F: [phone] Reason for Consult:  
Arianna Wynne is a 71 y.o. male who is seen in consultation at the request of Rojas Sena NP for evaluation of pancytopenia Treatment History: · EGD 11/11/19 normal 
· Colonoscopy 11/11/19 with moderate diverticulosis in the descending and sigmoid colon but was otherwise normal 
· Epoetin 20,000 units MWF History of Present Illness: The patient is a very pleasant [de-identified] y.o. male who was first seen by Dr. Anastacio Olszewski at the end of October for thrombocytopenia, iron deficiency anemia, and mediastinal lymphadenopathy. The plan was for a PET scan after discharge as well as a colonoscopy once patient is well enough and IV iron. Patient had an EGD and colonoscopy 11/11/19. EGD was normal. Colonoscopy showed moderate diverticulosis in the descending and sigmoid colon but was otherwise normal. Patient previously had gross hematuria which was believed to be the cause of his blood loss anemia. Patient remains in the hospital and now has neutropenia, so hematology was re-consulted. SinceI  last saw patient, patient completed his LVAD evaluation and had an implant placed on 11/18/19. He is hemodynamically stable and remains in the hospital for PT/OT. Patient reports he is doing fair. He remains weak. He has been working with PT/OT but activity is limited due to orthostatic hypotension. He denies CP or SOB. He denies hemoptysis, hematemesis, melena, hematuria or epistaxis. 1/16/2020 The patient's blood work looks good. There does not appear to be a problem with iron B12 or folate. Copper level is still pending. Patient is on a number of drugs that could affect the white count. It would be important to eliminate as many of those that we can safely. 1/17/2020 Patient sitting up in bed eating breakfast. Patient labs stable today. Copper level still pending Past Medical History:  
Diagnosis Date  Degenerative disc disease, lumbar  Heart failure ([facility] [address]  High cholesterol  Hypertension  Paroxysmal atrial fibrillation ([facility] [address] 2019  Spinal stenosis Past Surgical History:  
Procedure Laterality Date  COLONOSCOPY Left 2019 COLONOSCOPY at bedside performed by Viviane Haile MD at [address]  HX CORONARY ARTERY BYPASS GRAFT    
 triple  HX HERNIA REPAIR    
 HX IMPLANTABLE CARDIOVERTER DEFIBRILLATOR  ID CARDIOVERSION ELECTIVE ARRHYTHMIA EXTERNAL N/A 6/10/2019 EP CARDI[facility]ION [facility] [address] [facility]  ID CARDIOVERSION ELECTIVE ARRHYTHMIA EXTERNAL N/A 2019 EP CARDIOVERSION performed by Bishop Nadya MD at [facility] [address] CATH LAB  ID INSJ/RPLCMT PERM DFB W/TRNSVNS LDS 1/DUAL [facility] N/A 2019 INSERT ICD BIV MULTI performed by Lizandro Zaragoza MD at [address] [place] [address] CATH LAB  ID TCAT IMPL WRLS P-ART PRS SNR L-T HEMODYN MNTR N/A 2019 IMPLANT HEART FAILURE MONITORING DEVICE performed by Rocio Urban MD at [address] [place] [address] CATH LAB Social History Tobacco Use  Smoking status: Former Smoker Last attempt to quit: 2010 Years since quittin.1  Smokeless tobacco: Never Used Substance Use Topics  Alcohol use: Not Currently Comment: rarely Family History Problem Relation Age of Onset  Lung Disease Mother  Hypertension Mother [facility] Arthritis-osteo Mother  Heart Disease Mother  Heart Disease Father  Diabetes Father Current Facility-Administered Medications Medication Dose Route Frequency  magnesium sulfate 1 g/100 ml IVPB (premix or compounded)  1 g IntraVENous ONCE  potassium chloride SR (KLOR-CON 10) tablet 20 mEq  20 mEq Oral NOW  potassium chloride SR (KLOR-CON 10) tablet 20 mEq  20 mEq Oral BID  
  senna-docusate (PERICOLACE) 8.6-50 mg per tablet 1 Tab  1 Tab Oral PRN  
 collagenase (SANTYL) 250 unit/gram ointment   Topical DAILY  fludrocortisone (FLORINEF) tablet 0.1 mg  0.1 mg Oral DAILY  cholecalciferol (VITAMIN D3) (1000 Units /25 mcg) tablet 2 Tab  2,000 Units Oral DAILY  venlafaxine-SR (EFFEXOR-XR) capsule 150 mg  150 mg Oral DAILY WITH BREAKFAST  midodrine (PROAMITINE) tablet 5 mg  5 mg Oral TID WITH MEALS  
 hydrocortisone (CORTAID) 1 % cream   Topical TID PRN  thiamine HCL (B-1) tablet 100 mg  100 mg Oral DAILY  levoFLOXacin (LEVAQUIN) 750 mg in D5W IVPB  750 mg IntraVENous Q24H  cefepime (MAXIPIME) 2 g in 0.9% sodium chloride (MBP/ADV) 100 mL  2 g IntraVENous Q8H  
 oxybutynin (DITROPAN) tablet 5 mg  5 mg Oral Q6H PRN  
 magnesium oxide (MAG-OX) tablet 800 mg  800 mg Oral BID  lidocaine (URO-JET/ GLYDO) 2 % jelly   Urethral PRN  
 epoetin yvonne-epbx (RETACRIT) injection 20,000 Units  20,000 Units SubCUTAneous Q MON, WED & FRI  albuterol-ipratropium (DUO-NEB) 2.5 MG-0.5 MG/3 ML  3 mL Nebulization Q6H PRN  therapeutic multivitamin (THERAGRAN) tablet 1 Tab  1 Tab Oral DAILY  alteplase (CATHFLO) 1 mg in sterile water (preservative free) 1 mL injection  1 mg InterCATHeter PRN  finasteride (PROSCAR) tablet 5 mg  5 mg Oral DAILY  diphenhydrAMINE (BENADRYL) capsule 25 mg  25 mg Oral QHS PRN  
 octreotide (SANDOSTATIN) injection 25 mcg  25 mcg SubCUTAneous TID  pantoprazole (PROTONIX) tablet 40 mg  40 mg Oral ACB  arformoterol (BROVANA) neb solution 15 mcg  15 mcg Nebulization BID RT And  
 budesonide (PULMICORT) 500 mcg/2 ml nebulizer suspension  500 mcg Nebulization BID RT  
 lactobac ac& pc-s.therm-b.anim (TIAN Q/RISAQUAD)  1 Cap Oral DAILY  oxyCODONE IR (ROXICODONE) tablet 5 mg  5 mg Oral Q6H PRN  
 tamsulosin (FLOMAX) capsule 0.4 mg  0.4 mg Oral DAILY  Warfarin MD/NP dosing   Other PRN  
  sodium chloride (NS) flush 5-40 mL  5-40 mL IntraVENous Q8H  
 sodium chloride (NS) flush 5-40 mL  5-40 mL IntraVENous PRN  
 acetaminophen (TYLENOL) tablet 650 mg  650 mg Oral Q6H PRN  
 naloxone (NARCAN) injection 0.4 mg  0.4 mg IntraVENous PRN  
 ondansetron (ZOFRAN) injection 4 mg  4 mg IntraVENous Q4H PRN  
 sucralfate (CARAFATE) tablet 1 g  1 g Oral AC&HS  influenza vaccine 2019-20 (6 mos+)(PF) (FLUARIX/FLULAVAL/FLUZONE QUAD) injection 0.5 mL  0.5 mL IntraMUSCular PRIOR TO DISCHARGE  hydrALAZINE (APRESOLINE) 20 mg/mL injection 20 mg  20 mg IntraVENous Q6H PRN No Known Allergies Review of Systems: A complete review of systems was obtained, negative except as described above. Physical Exam:  
 
Visit Vitals BP 91/72 (BP 1 Location: Left arm, BP Patient Position: At rest) Pulse 92 Temp 98 °F (36.7 °C) Resp 20 Ht 6' 2\" (1.88 m) Wt 79.3 kg (174 lb 13.2 oz) SpO2 97% BMI 22.45 kg/m² ECOG PS: 2 General: No distress Eyes: PERRL, anicteric sclerae HENT: Atraumatic, OP clear Neck: Supple Respiratory: normal respiratory effort, on O2 
MS: In bed, moves all extremities Skin: Warm and dry Psych: Alert, oriented, appropriate affect, normal judgment/insight Results:  
 
Lab Results Component Value Date/Time WBC 2.5 (L) 01/17/2020 05:50 AM  
 HGB 9.5 (L) 01/17/2020 05:50 AM  
 HCT 30.8 (L) 01/17/2020 05:50 AM  
 PLATELET 78 (L) 01/11/2679 05:50 AM  
 MCV 94.8 01/17/2020 05:50 AM  
 ABS. NEUTROPHILS PENDING 01/17/2020 05:50 AM  
 
Lab Results Component Value Date/Time  Sodium 131 (L) 01/17/2020 05:50 AM  
 Potassium 3.8 01/17/2020 05:50 AM  
 Chloride 100 01/17/2020 05:50 AM  
 CO2 25 01/17/2020 05:50 AM  
 Glucose 103 (H) 01/17/2020 05:50 AM  
 BUN 23 (H) 01/17/2020 05:50 AM  
 Creatinine 0.97 01/17/2020 05:50 AM  
 GFR est AA >60 01/17/2020 05:50 AM  
 GFR est non-AA >60 01/17/2020 05:50 AM  
 Calcium 8.4 (L) 01/17/2020 05:50 AM  
 Glucose (POC) 137 (H) 01/07/2020 11:16 AM  
 
Lab Results Component Value Date/Time Bilirubin, total 0.5 01/17/2020 05:50 AM  
 ALT (SGPT) 36 01/17/2020 05:50 AM  
 AST (SGOT) 38 (H) 01/17/2020 05:50 AM  
 Alk. phosphatase 120 (H) 01/17/2020 05:50 AM  
 Protein, total 6.0 (L) 01/17/2020 05:50 AM  
 Albumin 2.2 (L) 01/17/2020 05:50 AM  
 Globulin 3.8 01/17/2020 05:50 AM  
 
CT C/A/P 10/25/19 IMPRESSION:  
1. Low-grade nonspecific mediastinal adenopathy. 2. Trace right pleural effusion. 3. Emphysema. 4. Left renal atrophy. 5. Colonic diverticula. 6. Moderate bladder distention with diverticula CTA CHEST 1/14/20 IMPRESSION:  
1. No occlusion or stenosis of the LVAD outflow tubing. 2. Nonocclusive radiodense foreign body in the posterior basilar left lower 
lobar artery. 3. Decreased pulmonary edema. Bilateral lower lobe atelectasis more likely than 
pneumonia. 4. Unchanged lymphadenopathy. Other incidental findings are described above. 1/15/20 Peripheral blood, smear:  
Pancytopenia without dysmyelopoiesis, dyserythropoiesis or platelet aggregation. Records reviewed and summarized above. Radiology report(s) reviewed above. Assessment:  
1) Neutropenia Peripheral blood smear demonstrates pancytopenia without dysmyelopoiesis, dyserythropoiesis or platelet aggregation. Patient does not have any signs or symptoms of infection May be secondary to nutritional deficiency, so will check vitamin B12, folate, and copper levels The following drugs have been associated with neutropenia Allopurinol, Maxipime, Clonopin, Levaquin, Apresoline,and Revatio. So the value of these drugs needs to be considered on an individual basis for this patient. --Cardiology stopped allopurinol, revatio, and klonopin. Still receiving Maxipine and Levaquin. Monitor for improvement. 2) Anemia with iron deficiency EGD and colonoscopy 11/11/19 did not show any obvious cause of bleeding It was believed that patient's iron deficiency anemia was secondary to gross hematuria. Will recheck iron studies now that patient's hematuria has resolved. The iron studies were normal as was B12 and folate. Patient is receiving Epoetin MWF 
 
3) Thrombocytopenia HIT panel checked when thrombocytopenia first started 10/31/19 and was negative May be secondary to nutritional deficiency 4) Mediastinal lymphadenopathy Unchanged on CTA Chest 1/14/20 Concerning for bronchogenic carcinoma. Patient will need a PET scan after discharge 5) CHF S/p LVAD 11/18/19 Patient on Coumadin for anticoagulation for LVAD with a INR goal 1.5-2 Management per cardiology and cardiac surgery 6) CAD s/p CABG Management per cardiology and cardiac surgery 7) CKD 3, atrophic left kidney Likely contributing to anemia Patient is on Epoetin MWF 
 
8) COPD Management per primary team 
 
9) Orthostatic hypotension Patient on midodrine and Florinef Management per cardiology 10) Malnutrition Likely contributing to pancytopenia RD is following for nutritional needs Plan: · CBC daily · labs: copper pending · Recommend PRBC transfusion for Hgb < 7.0 g/dL · Recommend PLT transfusion for PLT < 20 K or active bleeding · PET scan once patient is discharged · Evaluate the potential benefits of the drugs that have been associated with neutropenia. Will follow Call if questions I appreciate the opportunity to participate in Mr. Sona Kiser's care.

## 2020-01-17 NOTE — PROGRESS NOTES
[address] [facility] [place] 904 [facility] in [place], [place] Inpatient Progress Note Patient name: Corby David Patient : 1950 Patient MRN: [id number] Attending MD: Devin Eller MD 
Date of service: 20 Chief Complaint:  
Juan Luis Folds azucena LVAD implant 
  
HPI: Sona Kiser is a 71y.o. year old pleasant white male with a history of HTN, HLD, JOSE, CAD s/p cardiac arrest VFib s/p CABG () c/b sternal would infection and sternectomy, ischemic cardiomyopathy LVEF 15-20%, s/p ICD and with LBBB. He was admitted with acute on chronic systolic heart failure with massive volume overload > 20 lbs, in the setting of atrial fibrillation s/p failed DCCV and underwent DCCV and RHC on .  S/p BiVICD on 19 with Dr. Pennie Noriega. He was discharged home home on IV milrinone on 19. Mathew Boast has been followed closely by Dr. Jhonatan Meyers and the [facility]. 
  
Mr. Kiser was admitted for acute on chronic systolic heart failure. He underwent implantation of Impella 5.0 due to CS and has completed his LVAD evaluation. Mathew Boast meets criteria for implant of HM3 as DT. He was NYHA Class IV prior to implant of Impella 5.0, has LVEF < 15%, was intolerant of GDMT due to symptomatic hypotension and renal dysfunction. He remains dependent on temporary MCS support. RHC  revealed compensated hemodynamics on Impella. His renal function has improved dramatically with improvement in his hemodynamics. He is not a suitable transplant candidate due to single kidney, sternectomy, debility, and frailty. He was reviewed by Aneta Nicole and felt to be a high risk heart transplant candidate due to multiple co-morbidities as well as the afore mentioned conditions.  He remained in the CCU and underwent a HM 3 implant as DT on . He was weaned off of pressors and transferred to [place] [address] where he continues to undergo PT/OT and hemodynamic optimization.   
  
24Hr Events:  
Did not sleep last night WBC remains decreased No SOB Using CPAP Procedure:  Procedure(s): REMOVAL TEMPORARY LEFT VENTRICULAR ASSIST DEVICE, IMPLANTATION OF LEFT VENTRICULAR ASSIST DEVICE PERMANENT (VAD), ECC, JACQUE, EPI AORTIC US BY DR Abhilash Zazueta.    
POD:  60 
  
Impression / Plan:  
Heart Failure Status: NYHA Class IV, improved to NYHA Class III Chronic systolic heart failure due to ICM, NYHA Class IV, EF < 15%, cardiogenic shock bridged with Impella 5.0 support to HM 3 implant S/p Impella removal and LVAD implant 11/18/19 RPMs stable at 6700 Multiple PI events on interrogation - improved Discontinued Sildenafil due to leukopenia Intolerant of BB due to RV failure Intolerant of ACEi/ARB/ARNI/AA due to JUAN J on CKD 3 Intolerant of spironolactone d/t IVVD Hold midodrine today, MAPs >80 No diuretics today Strict I/O, daily weights Continue LVAD education Dressing change frequency three times weekly, sutures removed 12/26/19 Ramp test this week, set speed increased to 6700 Chest CTA- no outflow graft occlusion Bacteremia - Pseudomonas Blood cultures (12/31/19) 2/4 bottles +Pseudomonas & 2/4 bottles GNRs Repeat BC NGTD On IV Cefepime and Levaquin Follow procalcitonin and lactic acid levels - both improving Repeat sputum- few yeast, normal soren Repeat UA negative Orthostatic Hypotension Hold midodrine today, MAPs 's Cont Florinef 0.1 mg daily Stim test unremarkable Leukopenia Check Manual Diff Peripheral smear- pending Hematology recommendations appreciated- likely nutritional or medications Stopped allopurinol, revatio, klonopin. Generalized myoclonus Unchanged Appreciate Neurology input Discontinued Keppra due to dizziness Head CT negative for acute process EEG suggestive of mild generalized encephalopathic process, possibly related to underlying structural brain injury vs metabolic abnormality Monitor closely Off Digoxin  
  
Anticoagulation for LVAD, INR goal 1.5-2 Goal decreased d/t persistent hematuria No ASA d/t hematuria INR 1.6 Coumadin - 5mg tonight Acute Respiratory Failure post op Improved Wean FiO2 Pulmonary hygiene - recommend RT use flutter valve with nebs & Incentive spirometer Cont home CPAP- must use while sleeping Acute on CKD 3, atrophic left kidney Improved Appreciate Nephrology assistance- signed off Watch Cr, stable Avoid nephrotoxins, trend labs  
  
CAD s/p CABG Off ASA d/t hematuria No BB d/t RV failure Hold statin due to recent hepatic failure LHC performed 11/13 - low likelihood of benefit from revascularization  
  
Hx of VF arrest s/p BiV-ICD No recent shocks Keep K > 4 and Mg > 2  
Mag ox 800 mg po BID- watch for diarrhea Potassium 20mEq BID ICD reprogrammed to reduce diaphragmatic stimulation IV mag 1g today Extra Potassium 20mEq today PAF Off Dig due to lethargy and tremors No BB due to RV failure Repeat TFTs WNL 
  
Hx of gout Uric acid WNL Acute blood loss anemia Improved Likely due to hematuria Cont octreotide 25 mcg SQ TID Fecal occult 12/14 negative, positive on 12/17 Appreciate urology recommendations 
  
Urinary retention, c/b serratia UTI and hematuria Resolved Repeat UA with cx negative 1/13/20 Cystoscopy performed 11/13 shows catheter induced cystitis Pseudomonas aeruginosa positive UA culture (12/31/19) - on Cefepmine and levaquin Severe Acute on Chronic Protein Calorie Malnutrition Eating better Prealbumin weekly - 17 on 1/13 Appreciate Nutritionist assistance Diet as tolerated, encourage food from home, protein shakes Intolerant of mirtazapine d/t tremors, confusion Cont MVI add thiamine 100mg daily COPD Appreciate Pulmonology assistance Continue nebs PRN   
  
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  
  
Hx of sternal wound infection, sternectomy Sternum closed post op- monitor Delayed skin healing mid portion of sternotomy - evaluated by Dr. Rin Fraga, will continue IV abx and wet to dry dressings. Will likely require sternal wire after ~3 months from op date Vocal cord paralysis Improved ENT recs appreciated Speech therapy following - appreciate input Anxiety Stop klonopin due to leukopenia Monitor Depression Cont Effexor to 150 mg PO qAM  
Monitor rhythm strips for prolonged QTc 
  
Debility Continue PT/OT  
OOB at least x3 daily/ all meals Bladder spasms Received pyridium 100mg x4 doses Oxybutynin 5mg Q6hr prn  
  
Ppx Protonix PT/OT  
+daily BM  
PICC placed 11/22/19 Do Not Disturb at night from 10p-6a 
  
Dispo: 
Remain in [facility] at this time Will need IP rehab. Not ready for discharge at this time Patient seen and examined. Data and note reviewed. I have discussed and agree with the plans as noted. 71year old male with a history of ICM s/p LVAD as DT whose post op course has been complicated by RV failure, frailty, orthostatic hypotension, and pseudomonas bacteremia d/t urinary retention and pseudomonas UTI. Continue to encourage patient to sit up in a chair, use IS and use CPAP at night. Limit night-time disruptions. Continue LVAD education, PT/OT. Thank you for allowing us to participate in your patient's care. Mounika Holley MD, Chas Moss Chief of Cardiology, [facility] Medical Director [address] [place] [address], [address] Office [phone] Fax [phone] LVAD INTERROGATION: 
Device interrogated in person Device function normal, normal flow, multiple PI events LVAD Pump Speed (RPM): 6700 Pump Flow (LPM): 6.3 MAP: 102(took 2x) PI (Pulsitility Index): 2.5 Power: 6.4  Test: Yes 
Back Up  at Bedside & Labeled: Yes Power Module Test: Yes Driveline Site Care:  No 
 Driveline Dressing: Clean, Dry, and Intact Outpatient: No 
MAP in Therapeutic Range (Outpatient): No 
Testing Alarms Reviewed: Yes 
Back up SC speed: 6700 Back up Low Speed Limit: 6000 Emergency Equipment with Patient?: Yes Emergency procedures reviewed?: Yes Drive line site inspected?: No 
Drive line intergrity inspected?: Yes Drive line dressing changed?: No 
 
PHYSICAL EXAM: 
Visit Vitals BP 91/72 (BP 1 Location: Left arm, BP Patient Position: At rest) Pulse 93 Temp 97.9 °F (36.6 °C) Resp 20 Ht 6' 2\" (1.88 m) Wt 174 lb 13.2 oz (79.3 kg) SpO2 97% BMI 22.45 kg/m² Physical Exam  
Constitutional: He is oriented to person, place, and time. He appears well-developed. He appears cachectic. No distress. HENT:  
Head: Normocephalic. Neck: Normal range of motion. Neck supple. No JVD present. Cardiovascular: Normal rate, regular rhythm and normal heart sounds. + VAD hum Pulmonary/Chest: Effort normal. No tachypnea. No respiratory distress. He has rhonchi in the right upper field and the left upper field. Abdominal: Soft. Bowel sounds are normal. He exhibits no distension. Musculoskeletal: Normal range of motion. General: No edema. Neurological: He is oriented to person, place, and time. More lethargic today Skin: Skin is warm and dry. Psychiatric: He has a normal mood and affect. His speech is normal and behavior is normal.  
~1 cm x 1 cm x 0.25 cm area of delayed closure on sternotomy. Site clean, no erythema or drainage noted. Subcutaneous tissue noted. Nursing note and vitals reviewed. REVIEW OF SYSTEMS: 
Review of Systems Constitutional: Positive for malaise/fatigue. Negative for chills and fever. HENT: Positive for hearing loss. Negative for nosebleeds. Eyes: Negative. Respiratory: Negative for cough and shortness of breath. Mild dyspnea Cardiovascular: Negative for chest pain, palpitations, orthopnea and leg swelling. Orthostatic Gastrointestinal: Negative for heartburn, nausea and vomiting. Genitourinary: Negative for dysuria and hematuria. Musculoskeletal: Negative. Neurological: Positive for dizziness, tremors and weakness. Negative for headaches. Mild dizziness - improving Endo/Heme/Allergies: Does not bruise/bleed easily. PAST MEDICAL HISTORY: 
Past Medical History:  
Diagnosis Date  Degenerative disc disease, lumbar  Heart failure ([facility] [address]  High cholesterol  Hypertension  Paroxysmal atrial fibrillation ([facility] [address] 4/2/2019  Spinal stenosis PAST SURGICAL HISTORY: 
Past Surgical History:  
Procedure Laterality Date  COLONOSCOPY Left 11/11/2019 COLONOSCOPY at bedside performed by Sid Ray MD at [address]  HX CORONARY ARTERY BYPASS GRAFT    
 triple  HX HERNIA REPAIR    
 HX IMPLANTABLE CARDIOVERTER DEFIBRILLATOR  TN CARDIOVERSION ELECTIVE ARRHYTHMIA EXTERNAL N/A 6/10/2019 EP CARDIOVERSION performed by Louann Valdivia [place] [address], [address] CATH LAB  TN CARDIOVERSION ELECTIVE ARRHYTHMIA EXTERNAL N/A 6/18/2019 EP CARDIOVERSION performed by Bossman Reid MD at [place] [address], [address] CATH LAB  TN INSJ/RPLCMT PERM DFB W/TRNSVNS LDS 1/DUAL CHMBR N/A 6/21/2019 INSERT ICD BIV MULTI performed by Marii Rey [place] [address], [address] CATH LAB  TN TCAT IMPL WRLS P-ART PRS SNR L-T HEMODYN MNTR N/A 9/18/2019 IMPLANT HEART FAILURE MONITORING DEVICE performed by Aurora Lucio MD [place] [address], [address] CATH LAB FAMILY HISTORY: 
Family History Problem Relation Age of Onset  Lung Disease Mother  Hypertension Mother [facility] Arthritis-osteo Mother  Heart Disease Mother  Heart Disease Father  Diabetes Father SOCIAL HISTORY: 
Social History Socioeconomic History  Marital status:  Spouse name: Not on file  Number of children: Not on file  Years of education: Not on file  Highest education level: Not on file Tobacco Use  Smoking status: Former Smoker Last attempt to quit: 2010 Years since quittin.1  Smokeless tobacco: Never Used Substance and Sexual Activity  Alcohol use: Not Currently Comment: rarely  Drug use: Never Other Topics Concern LABORATORY RESULTS: 
  
Labs Latest Ref Rng & Units 2020 2020 1/15/2020 1/15/2020 2020 2020 2020 WBC 4.1 - 11.1 K/uL 2. 5(L) 2. 4(L) - 2. 4(L) 2. 8(L) 2. 5(L) 4.1  
RBC 4.10 - 5.70 M/uL 3.25(L) 3.22(L) - 3.04(L) 3.05(L) 3.08(L) 2.91(L) Hemoglobin 12.1 - 17.0 g/dL 9.5(L) 9.5(L) - 9. 0(L) 9.1(L) 9.2(L) 8.6(L) Hematocrit 36.6 - 50.3 % 30. 8(L) 30. 5(L) - 29. 2(L) 29. 4(L) 29. 6(L) 28. 4(L) MCV 80.0 - 99.0 FL 94.8 94.7 - 96.1 96.4 96.1 97.6 Platelets 812 - 348 K/uL 78(L) 83(L) - 90(L) 94(L) 93(L) 114(L) Lymphocytes 12 - 49 % 10(L) - - - 8(L) - - Monocytes 5 - 13 % 6 - - - 3(L) - - Eosinophils 0 - 7 % 2 - - - 0 - - Basophils 0 - 1 % 0 - - - 0 - - Bands 0 - 6 % 3 - - - 0 - - Blasts 0 % 0 - - - 0 - - Albumin 3.5 - 5.0 g/dL 2. 2(L) 2. 0(L) - 2. 1(L) - 2. 2(L) 2. 3(L) Calcium 8.5 - 10.1 MG/DL 8.4(L) 8. 1(L) - 7. 9(L) - 8. 3(L) 8.6 SGOT 15 - 37 U/L 38(H) 57(H) - 64(H) - 39(H) 22 Glucose 65 - 100 mg/dL 103(H) 114(H) - 91 - 89 99 BUN 6 - 20 MG/DL 23(H) 23(H) - 24(H) - 22(H) 18 Creatinine 0.70 - 1.30 MG/DL 0.97 1.02 - 1.04 - 1.07 0.90 Sodium 136 - 145 mmol/L 131(L) 132(L) - 131(L) - 130(L) 132(L) Potassium 3.5 - 5.1 mmol/L 3.8 3. 2(L) - 3. 2(L) - 3. 2(L) 3.5 TSH 0.36 - 3.74 uIU/mL - - - - - - -  
LDH 85 - 241 U/L 252(H) 253(H) 224 236 - 223 196 Some recent data might be hidden Lab Results Component Value Date/Time TSH 2.30 2019 03:29 AM  
 TSH 2.40 10/25/2019 07:39 PM  
 TSH 2.45 2019 04:16 AM  
 
 
ALLERGY: 
No Known Allergies CURRENT MEDICATIONS: 
Current Facility-Administered Medications Medication Dose Route Frequency  warfarin (COUMADIN) tablet 5 mg  5 mg Oral ONCE  potassium chloride SR (KLOR-CON 10) tablet 20 mEq  20 mEq Oral BID  senna-docusate (PERICOLACE) 8.6-50 mg per tablet 1 Tab  1 Tab Oral PRN  
 collagenase (SANTYL) 250 unit/gram ointment   Topical DAILY  fludrocortisone (FLORINEF) tablet 0.1 mg  0.1 mg Oral DAILY  cholecalciferol (VITAMIN D3) (1000 Units /25 mcg) tablet 2 Tab  2,000 Units Oral DAILY  venlafaxine-SR (EFFEXOR-XR) capsule 150 mg  150 mg Oral DAILY WITH BREAKFAST  [Held by provider] midodrine (PROAMITINE) tablet 5 mg  5 mg Oral TID WITH MEALS  
 hydrocortisone (CORTAID) 1 % cream   Topical TID PRN  thiamine HCL (B-1) tablet 100 mg  100 mg Oral DAILY  levoFLOXacin (LEVAQUIN) 750 mg in D5W IVPB  750 mg IntraVENous Q24H  cefepime (MAXIPIME) 2 g in 0.9% sodium chloride (MBP/ADV) 100 mL  2 g IntraVENous Q8H  
 oxybutynin (DITROPAN) tablet 5 mg  5 mg Oral Q6H PRN  
 magnesium oxide (MAG-OX) tablet 800 mg  800 mg Oral BID  lidocaine (URO-JET/ GLYDO) 2 % jelly   Urethral PRN  
 epoetin yvonne-epbx (RETACRIT) injection 20,000 Units  20,000 Units SubCUTAneous Q MON, WED & FRI  albuterol-ipratropium (DUO-NEB) 2.5 MG-0.5 MG/3 ML  3 mL Nebulization Q6H PRN  therapeutic multivitamin (THERAGRAN) tablet 1 Tab  1 Tab Oral DAILY  alteplase (CATHFLO) 1 mg in sterile water (preservative free) 1 mL injection  1 mg InterCATHeter PRN  finasteride (PROSCAR) tablet 5 mg  5 mg Oral DAILY  diphenhydrAMINE (BENADRYL) capsule 25 mg  25 mg Oral QHS PRN  
 octreotide (SANDOSTATIN) injection 25 mcg  25 mcg SubCUTAneous TID  pantoprazole (PROTONIX) tablet 40 mg  40 mg Oral ACB  arformoterol (BROVANA) neb solution 15 mcg  15 mcg Nebulization BID RT And  
 budesonide (PULMICORT) 500 mcg/2 ml nebulizer suspension  500 mcg Nebulization BID RT  
 lactobac ac& pc-s.therm-b.anim (TIAN Q/RISAQUAD)  1 Cap Oral DAILY  oxyCODONE IR (ROXICODONE) tablet 5 mg  5 mg Oral Q6H PRN  
  tamsulosin (FLOMAX) capsule 0.4 mg  0.4 mg Oral DAILY  Warfarin MD/NP dosing   Other PRN  
 sodium chloride (NS) flush 5-40 mL  5-40 mL IntraVENous Q8H  
 sodium chloride (NS) flush 5-40 mL  5-40 mL IntraVENous PRN  
 acetaminophen (TYLENOL) tablet 650 mg  650 mg Oral Q6H PRN  
 naloxone (NARCAN) injection 0.4 mg  0.4 mg IntraVENous PRN  
 ondansetron (ZOFRAN) injection 4 mg  4 mg IntraVENous Q4H PRN  
 sucralfate (CARAFATE) tablet 1 g  1 g Oral AC&HS  influenza vaccine 2019-20 (6 mos+)(PF) (FLUARIX/FLULAVAL/FLUZONE QUAD) injection 0.5 mL  0.5 mL IntraMUSCular PRIOR TO DISCHARGE  hydrALAZINE (APRESOLINE) 20 mg/mL injection 20 mg  20 mg IntraVENous Q6H PRN Thank you for allowing me to participate in this patient's care. TIERRA Beasley[address], Suite [facility] Phone: [phone] Fax: [phone]

## 2020-01-18 NOTE — PROGRESS NOTES
0730: Bedside shift change report given to Alex Trejo Angelica (oncoming nurse) by Colin Miller (offgoing nurse). Report included the following information SBAR and Kardex. 1930: Bedside shift change report given to Colin Miller (oncoming nurse) by Phuong Kee RN (offgoing nurse). Report included the following information SBAR and Kardex. Problem: Falls - Risk of 
Goal: *Absence of Falls Description Document Sondramargarita Trever Fall Risk and appropriate interventions in the flowsheet. Outcome: Progressing Towards Goal 
Note: Fall Risk Interventions: 
Mobility Interventions: Assess mobility with egress test, Communicate number of staff needed for ambulation/transfer, OT consult for ADLs, Patient to call before getting OOB, PT Consult for mobility concerns, PT Consult for assist device competence, Strengthening exercises (ROM-active/passive), Utilize walker, cane, or other assistive device, Utilize gait belt for transfers/ambulation Mentation Interventions: Adequate sleep, hydration, pain control, Evaluate medications/consider consulting pharmacy, Gait belt with transfers/ambulation, Increase mobility, Room close to nurse's station, Update white board Medication Interventions: Evaluate medications/consider consulting pharmacy, Patient to call before getting OOB, Teach patient to arise slowly, Utilize gait belt for transfers/ambulation Elimination Interventions: Call light in reach, Patient to call for help with toileting needs History of Falls Interventions: Consult care management for discharge planning, Evaluate medications/consider consulting pharmacy, Room close to nurse's station, Utilize gait belt for transfer/ambulation Problem: Pressure Injury - Risk of 
Goal: *Prevention of pressure injury Description Document Mich Scale and appropriate interventions in the flowsheet. Offload heels Turn approximately every 2 hours Outcome: Progressing Towards Goal 
Note: Pressure Injury Interventions: Sensory Interventions: Assess changes in LOC, Assess need for specialty bed, Check visual cues for pain, Discuss PT/OT consult with provider, Keep linens dry and wrinkle-free, Maintain/enhance activity level, Pressure redistribution bed/mattress (bed type) Moisture Interventions: Absorbent underpads, Apply protective barrier, creams and emollients, Assess need for specialty bed, Internal/External urinary devices, Maintain skin hydration (lotion/cream) Activity Interventions: Assess need for specialty bed, Increase time out of bed, Pressure redistribution bed/mattress(bed type), PT/OT evaluation Mobility Interventions: Assess need for specialty bed, Chair cushion, Pressure redistribution bed/mattress (bed type), PT/OT evaluation Nutrition Interventions: Document food/fluid/supplement intake, Discuss nutritional consult with provider, Offer support with meals,snacks and hydration Friction and Shear Interventions: Apply protective barrier, creams and emollients, Lift sheet Problem: Patient Education: Go to Patient Education Activity Goal: Patient/Family Education Outcome: Progressing Towards Goal

## 2020-01-18 NOTE — PROGRESS NOTES
[address] [facility] [place] [address] [facility] in [place], [place] Inpatient Progress Note Patient name: Fahad Gibbs Patient : 1950 Patient MRN: [id number] Attending MD: Mary Heath MD 
Date of service: 20 Chief Complaint:  
Clmariana Arizmendi azucena LVAD implant 
  
HPI: Sona Kiser is a 71y.o. year old pleasant white male with a history of HTN, HLD, JOSE, CAD s/p cardiac arrest VFib s/p CABG () c/b sternal would infection and sternectomy, ischemic cardiomyopathy LVEF 15-20%, s/p ICD and with LBBB. He was admitted with acute on chronic systolic heart failure with massive volume overload > 20 lbs, in the setting of atrial fibrillation s/p failed DCCV and underwent DCCV and RHC on .  S/p BiVICD on 19 with Dr. Dre Porter. He was discharged home home on IV milrinone on 19. Herman Haynes has been followed closely by Dr. Tia Montana and the [facility]. 
  
Mr. Kiser was admitted for acute on chronic systolic heart failure. He underwent implantation of Impella 5.0 due to CS and has completed his LVAD evaluation. Herman Haynes meets criteria for implant of HM3 as DT. He was NYHA Class IV prior to implant of Impella 5.0, has LVEF < 15%, was intolerant of GDMT due to symptomatic hypotension and renal dysfunction. He remains dependent on temporary MCS support. RHC  revealed compensated hemodynamics on Impella. His renal function has improved dramatically with improvement in his hemodynamics. He is not a suitable transplant candidate due to single kidney, sternectomy, debility, and frailty. He was reviewed by Gaby Gaines and felt to be a high risk heart transplant candidate due to multiple co-morbidities as well as the afore mentioned conditions.  He remained in the CCU and underwent a HM 3 implant as DT on . He was weaned off of pressors and transferred to [place] [address] where he continues to undergo PT/OT and hemodynamic optimization.   
  
24Hr Events:  
Did not sleep last night WBC remains decreased Platelets Using CPAP Procedure:  Procedure(s): REMOVAL TEMPORARY LEFT VENTRICULAR ASSIST DEVICE, IMPLANTATION OF LEFT VENTRICULAR ASSIST DEVICE PERMANENT (VAD), ECC, JACQUE, EPI AORTIC US BY DR Estrella Felton.    
POD:  61 
  
Impression / Plan:  
Heart Failure Status: NYHA Class IV, improved to NYHA Class III Chronic systolic heart failure due to ICM, NYHA Class IV, EF < 15%, cardiogenic shock bridged with Impella 5.0 support to HM 3 implant S/p Impella removal and LVAD implant 11/18/19 RPMs stable at 6700 Multiple PI events on interrogation - improved Discontinued Sildenafil due to leukopenia Intolerant of BB due to RV failure Intolerant of ACEi/ARB/ARNI/AA due to JUAN J on CKD 3 Intolerant of spironolactone d/t IVVD Hold midodrine, MAPs >80 No diuretics today Strict I/O, daily weights Continue LVAD education Dressing change frequency three times weekly, sutures removed 12/26/19 Ramp test this week, set speed increased to 6700 Chest CTA- no outflow graft occlusion Bacteremia - Pseudomonas Blood cultures (12/31/19) 2/4 bottles +Pseudomonas & 2/4 bottles GNRs Repeat BC NGTD On IV Cefepime and Levaquin Follow procalcitonin and lactic acid levels - both improving Repeat sputum- few yeast, normal soren Repeat UA negative Orthostatic Hypotension Hold midodrine, MAPs 70-90s Cont Florinef 0.1 mg daily Stim test unremarkable Leukopenia Check Manual Diff Peripheral smear-(1/15/2020) Pancytopenia without dysmyelopoiesis, dyserythropoiesis or platelet aggregation Hematology recommendations appreciated- likely nutritional or medications Stopped allopurinol, revatio, klonopin. Ask ID for recommendations/input- on Cefepime and Levaquin Thrombocytopenia Peripheral smear-(1/15/2020) Pancytopenia without dysmyelopoiesis, dyserythropoiesis or platelet aggregation Plt trending down, 68 today Hematology following, appreciate assistance May be secondary to nutritional deficiency HIT negative Generalized myoclonus Unchanged Appreciate Neurology input Discontinued Keppra due to dizziness Head CT negative for acute process EEG suggestive of mild generalized encephalopathic process, possibly related to underlying structural brain injury vs metabolic abnormality Monitor closely Off Digoxin  
  
Anticoagulation for LVAD, INR goal 1.5-2 Goal decreased d/t persistent hematuria No ASA d/t hematuria INR 1.6 Coumadin - 5mg tonight Acute Respiratory Failure post op Improved Wean FiO2 Pulmonary hygiene - recommend RT use flutter valve with nebs & Incentive spirometer Cont home CPAP- must use while sleeping Acute on CKD 3, atrophic left kidney Improved Appreciate Nephrology assistance- signed off Watch Cr, stable Avoid nephrotoxins, trend labs  
  
CAD s/p CABG Off ASA d/t hematuria No BB d/t RV failure Hold statin due to recent hepatic failure LHC performed 11/13 - low likelihood of benefit from revascularization  
  
Hx of VF arrest s/p BiV-ICD No recent shocks Keep K > 4 and Mg > 2  
Mag ox 800 mg po BID- watch for diarrhea Potassium 20mEq BID ICD reprogrammed to reduce diaphragmatic stimulation IV mag 1g today Extra Potassium 20mEq today PAF Off Dig due to lethargy and tremors No BB due to RV failure Repeat TFTs WNL 
  
Hx of gout Uric acid WNL Acute blood loss anemia Improved Likely due to hematuria Cont octreotide 25 mcg SQ TID Fecal occult 12/14 negative, positive on 12/17 Appreciate urology recommendations 
  
Urinary retention, c/b serratia UTI and hematuria Resolved Repeat UA with cx negative 1/13/20 Cystoscopy performed 11/13 shows catheter induced cystitis Pseudomonas aeruginosa positive UA culture (12/31/19) - on Cefepmine and levaquin Severe Acute on Chronic Protein Calorie Malnutrition Eating better Prealbumin weekly - 17 on 1/13 Appreciate Nutritionist assistance Diet as tolerated, encourage food from home, protein shakes Intolerant of mirtazapine d/t tremors, confusion Cont MVI add thiamine 100mg daily COPD Appreciate Pulmonology assistance Continue nebs PRN   
  
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  
  
Hx of sternal wound infection, sternectomy Sternum closed post op- monitor Delayed skin healing mid portion of sternotomy - evaluated by Dr. Bridget Dailey, will continue IV abx and wet to dry dressings. Will likely require sternal wire after ~3 months from op date Vocal cord paralysis Improved ENT recs appreciated Speech therapy following - appreciate input Anxiety Stop klonopin due to leukopenia Monitor Depression Cont Effexor to 150 mg PO qAM  
Monitor rhythm strips for prolonged QTc 
  
Debility Continue PT/OT  
OOB at least x3 daily/ all meals Bladder spasms Received pyridium 100mg x4 doses Oxybutynin 5mg Q6hr prn  
  
Ppx Protonix PT/OT  
+daily BM  
PICC placed 11/22/19 Do Not Disturb at night from 10p-6a 
  
Dispo: 
Remain in [facility] at this time Will need IP rehab. Not ready for discharge at this time LVAD INTERROGATION: 
Device interrogated in person Device function normal, normal flow, multiple PI events LVAD Pump Speed (RPM): 6700 Pump Flow (LPM): 6.4 MAP: 90 
PI (Pulsitility Index): 2.1 Power: 6.1  Test: Yes 
Back Up  at Bedside & Labeled: Yes Power Module Test: Yes Driveline Site Care: No 
Driveline Dressing: Clean, Dry, and Intact Outpatient: No 
MAP in Therapeutic Range (Outpatient): Yes Testing Alarms Reviewed: Yes 
Back up SC speed: 6700 Back up Low Speed Limit: 6000 Emergency Equipment with Patient?: Yes Emergency procedures reviewed?: Yes Drive line site inspected?: No 
Drive line intergrity inspected?: Yes Drive line dressing changed?: Yes 
 
 PHYSICAL EXAM: 
Visit Vitals BP 91/72 (BP 1 Location: Left arm, BP Patient Position: At rest) Pulse 82 Temp 97.5 °F (36.4 °C) Resp 18 Ht 6' 2\" (1.88 m) Wt 168 lb 6.9 oz (76.4 kg) SpO2 98% BMI 21.63 kg/m² Physical Exam  
Constitutional: He is oriented to person, place, and time. He appears well-developed. He appears cachectic. No distress. HENT:  
Head: Normocephalic. Neck: Normal range of motion. Neck supple. No JVD present. Cardiovascular: Normal rate, regular rhythm and normal heart sounds. + VAD hum Pulmonary/Chest: Effort normal. No tachypnea. No respiratory distress. He has rhonchi in the right upper field and the left upper field. Abdominal: Soft. Bowel sounds are normal. He exhibits no distension. Musculoskeletal: Normal range of motion. General: No edema. Neurological: He is oriented to person, place, and time. More lethargic today Skin: Skin is warm and dry. Psychiatric: He has a normal mood and affect. His speech is normal and behavior is normal.  
~1 cm x 1 cm x 0.25 cm area of delayed closure on sternotomy. Site clean, no erythema or drainage noted. Subcutaneous tissue noted. Nursing note and vitals reviewed. REVIEW OF SYSTEMS: 
Review of Systems Constitutional: Positive for malaise/fatigue. Negative for chills and fever. HENT: Positive for hearing loss. Negative for nosebleeds. Eyes: Negative. Respiratory: Negative for cough and shortness of breath. Mild dyspnea Cardiovascular: Negative for chest pain, palpitations, orthopnea and leg swelling. Orthostatic Gastrointestinal: Negative for heartburn, nausea and vomiting. Genitourinary: Negative for dysuria and hematuria. Musculoskeletal: Negative. Neurological: Positive for dizziness, tremors and weakness. Negative for headaches. Mild dizziness - improving Endo/Heme/Allergies: Does not bruise/bleed easily.   
 
 
PAST MEDICAL HISTORY: 
 Past Medical History:  
Diagnosis Date  Degenerative disc disease, lumbar  Heart failure ([facility]  High cholesterol  Hypertension  Paroxysmal atrial fibrillation ([facility] 2019  Spinal stenosis PAST SURGICAL HISTORY: 
Past Surgical History:  
Procedure Laterality Date  COLONOSCOPY Left 2019 COLONOSCOPY at bedside performed by Nikhil Cho MD at [address]  HX CORONARY ARTERY BYPASS GRAFT    
 triple  HX HERNIA REPAIR    
 HX IMPLANTABLE CARDIOVERTER DEFIBRILLATOR  [place] CARDIOVERSION ELECTIVE ARRHYTHMIA EXTERNAL N/A 6/10/2019 EP CARDIOVERSION [facility] D[facility] [address] CATH LAB  [place] CARDIOVERSION ELECTIVE ARRHYTHMIA EXTERNAL N/A 2019 EP CARDIOVERSION performed by Elizabet Phillips MD at [facility] [address] CATH LAB  [place] INSJ/RPLCMT PERM DFB W/TRNSVNS LDS 1/DUAL CHMBR N/A 2019 INSERT ICD BIV MULTI performed by Oren Kenrdick MD at [facility] [place] [address] CATH LAB  [place] TCAT IMPL WRLS P-ART PRS SNR L-T HEMODYN MNTR N/A 2019 IMPLANT HEART FAILURE MONITORING DEVICE performed by Lisandra Fonseca MD at [facility] [place] [address] CATH LAB FAMILY HISTORY: 
Family History Problem Relation Age of Onset  Lung Disease Mother  Hypertension Mother [facility] Arthritis-osteo Mother  Heart Disease Mother  Heart Disease Father  Diabetes Father SOCIAL HISTORY: 
Social History Socioeconomic History  Marital status:  Spouse name: Not on file  Number of children: Not on file  Years of education: Not on file  Highest education level: Not on file Tobacco Use  Smoking status: Former Smoker Last attempt to quit: 2010 Years since quittin.1  Smokeless tobacco: Never Used Substance and Sexual Activity  Alcohol use: Not Currently Comment: rarely  Drug use: Never Other Topics Concern LABORATORY RESULTS: 
  
 Labs Latest Ref Rng & Units 1/18/2020 1/17/2020 1/16/2020 1/15/2020 1/15/2020 1/14/2020 1/14/2020 WBC 4.1 - 11.1 K/uL 2. 6(L) 2. 5(L) 2. 4(L) - 2. 4(L) 2. 8(L) 2. 5(L)  
RBC 4.10 - 5.70 M/uL 3.11(L) 3.25(L) 3.22(L) - 3.04(L) 3.05(L) 3.08(L) Hemoglobin 12.1 - 17.0 g/dL 9.2(L) 9.5(L) 9.5(L) - 9. 0(L) 9.1(L) 9.2(L) Hematocrit 36.6 - 50.3 % 29. 5(L) 30. 8(L) 30. 5(L) - 29. 2(L) 29. 4(L) 29. 6(L) MCV 80.0 - 99.0 FL 94.9 94.8 94.7 - 96.1 96.4 96.1 Platelets 157 - 729 K/uL 68(L) 78(L) 83(L) - 90(L) 94(L) 93(L) Lymphocytes 12 - 49 % 19 10(L) - - - 8(L) - Monocytes 5 - 13 % 12 6 - - - 3(L) - Eosinophils 0 - 7 % 4 2 - - - 0 - Basophils 0 - 1 % 1 0 - - - 0 - Bands 0 - 6 % 2 3 - - - 0 - Blasts 0 % 0 0 - - - 0 - Albumin 3.5 - 5.0 g/dL 2. 1(L) 2. 2(L) 2. 0(L) - 2. 1(L) - 2. 2(L) Calcium 8.5 - 10.1 MG/DL 8.4(L) 8.4(L) 8. 1(L) - 7. 9(L) - 8. 3(L) SGOT 15 - 37 U/L 31 38(H) 57(H) - 64(H) - 39(H) Glucose 65 - 100 mg/dL 92 103(H) 114(H) - 91 - 89 BUN 6 - 20 MG/DL 25(H) 23(H) 23(H) - 24(H) - 22(H) Creatinine 0.70 - 1.30 MG/DL 0.99 0.97 1.02 - 1.04 - 1.07 Sodium 136 - 145 mmol/L 133(L) 131(L) 132(L) - 131(L) - 130(L) Potassium 3.5 - 5.1 mmol/L 4.1 3.8 3. 2(L) - 3. 2(L) - 3. 2(L) TSH 0.36 - 3.74 uIU/mL - - - - - - -  
LDH 85 - 241 U/L 251(H) 252(H) 253(H) 224 236 - 223 Some recent data might be hidden Lab Results Component Value Date/Time TSH 2.30 12/03/2019 03:29 AM  
 TSH 2.40 10/25/2019 07:39 PM  
 TSH 2.45 06/01/2019 04:16 AM  
 
 
ALLERGY: 
No Known Allergies CURRENT MEDICATIONS: 
Current Facility-Administered Medications Medication Dose Route Frequency  potassium chloride SR (KLOR-CON 10) tablet 20 mEq  20 mEq Oral BID  senna-docusate (PERICOLACE) 8.6-50 mg per tablet 1 Tab  1 Tab Oral PRN  
 collagenase (SANTYL) 250 unit/gram ointment   Topical DAILY  fludrocortisone (FLORINEF) tablet 0.1 mg  0.1 mg Oral DAILY  cholecalciferol (VITAMIN D3) (1000 Units /25 mcg) tablet 2 Tab  2,000 Units Oral DAILY  venlafaxine-SR (EFFEXOR-XR) capsule 150 mg  150 mg Oral DAILY WITH BREAKFAST  [Held by provider] midodrine (PROAMITINE) tablet 5 mg  5 mg Oral TID WITH MEALS  
 hydrocortisone (CORTAID) 1 % cream   Topical TID PRN  thiamine HCL (B-1) tablet 100 mg  100 mg Oral DAILY  levoFLOXacin (LEVAQUIN) 750 mg in D5W IVPB  750 mg IntraVENous Q24H  cefepime (MAXIPIME) 2 g in 0.9% sodium chloride (MBP/ADV) 100 mL  2 g IntraVENous Q8H  
 oxybutynin (DITROPAN) tablet 5 mg  5 mg Oral Q6H PRN  
 magnesium oxide (MAG-OX) tablet 800 mg  800 mg Oral BID  lidocaine (URO-JET/ GLYDO) 2 % jelly   Urethral PRN  
 epoetin yvonne-epbx (RETACRIT) injection 20,000 Units  20,000 Units SubCUTAneous Q MON, WED & FRI  albuterol-ipratropium (DUO-NEB) 2.5 MG-0.5 MG/3 ML  3 mL Nebulization Q6H PRN  therapeutic multivitamin (THERAGRAN) tablet 1 Tab  1 Tab Oral DAILY  alteplase (CATHFLO) 1 mg in sterile water (preservative free) 1 mL injection  1 mg InterCATHeter PRN  finasteride (PROSCAR) tablet 5 mg  5 mg Oral DAILY  diphenhydrAMINE (BENADRYL) capsule 25 mg  25 mg Oral QHS PRN  
 octreotide (SANDOSTATIN) injection 25 mcg  25 mcg SubCUTAneous TID  pantoprazole (PROTONIX) tablet 40 mg  40 mg Oral ACB  arformoterol (BROVANA) neb solution 15 mcg  15 mcg Nebulization BID RT And  
 budesonide (PULMICORT) 500 mcg/2 ml nebulizer suspension  500 mcg Nebulization BID RT  
 lactobac ac& pc-s.therm-b.anim (TIAN Q/RISAQUAD)  1 Cap Oral DAILY  oxyCODONE IR (ROXICODONE) tablet 5 mg  5 mg Oral Q6H PRN  
 tamsulosin (FLOMAX) capsule 0.4 mg  0.4 mg Oral DAILY  Warfarin MD/NP dosing   Other PRN  
 sodium chloride (NS) flush 5-40 mL  5-40 mL IntraVENous Q8H  
 sodium chloride (NS) flush 5-40 mL  5-40 mL IntraVENous PRN  
 acetaminophen (TYLENOL) tablet 650 mg  650 mg Oral Q6H PRN  
 naloxone (NARCAN) injection 0.4 mg  0.4 mg IntraVENous PRN  
  ondansetron (ZOFRAN) injection 4 mg  4 mg IntraVENous Q4H PRN  
 sucralfate (CARAFATE) tablet 1 g  1 g Oral AC&HS  influenza vaccine 2019-20 (6 mos+)(PF) (FLUARIX/FLULAVAL/FLUZONE QUAD) injection 0.5 mL  0.5 mL IntraMUSCular PRIOR TO DISCHARGE  hydrALAZINE (APRESOLINE) 20 mg/mL injection 20 mg  20 mg IntraVENous Q6H PRN Thank you for allowing me to participate in this patient's care. TIERRA Lopez [address] [place] [address], [address] Phone: [phone] Fax: [phone]

## 2020-01-18 NOTE — PROGRESS NOTES
ID Progress Note 2020 Subjective: Afebrile. He does not have any dysuria. He does not have any abdominal pain. He does say that he does have some dyspnea. Review of systems: No anaphylaxis, seizures or hematemesis. Objective: Antibiotics: 1. Levaquin 2. Cefepime 3. Rifampin 4. Fluconazole 5. Vancomycin 6. Cefazolin 7. Zosyn Vitals:  
Visit Vitals BP 91/72 (BP 1 Location: Left arm, BP Patient Position: At rest) Pulse 84 Temp 97.3 °F (36.3 °C) Resp 18 Ht 6' 2\" (1.88 m) Wt 76.4 kg (168 lb 6.9 oz) SpO2 100% BMI 21.63 kg/m² Tmax:  Temp (24hrs), Av.9 °F (36.6 °C), Min:97.3 °F (36.3 °C), Max:98.2 °F (36.8 °C) Exam: Not in distress Pink conjunctivae anicteric sclerae No cervical lymphadenopathy Lungs: Clear anteriorly Heart: I can hear the hum of the LVAD Abdomen:  soft, non-tender. Guarding or rebound Sternal wound is dressed and dry Speech fluent Labs:     
Recent Labs  
  20 
0339 20 
0550 20 
0457 WBC 2.6* 2.5* 2.4* HGB 9.2* 9.5* 9.5* PLT 68* 78* 83*  
BUN 25* 23* 23* CREA 0.99 0.97 1.02  
SGOT 31 38* 57*  120* 121* TBILI 0.4 0.5 0.4 Cultures: No results found for: SDES Lab Results Component Value Date/Time Culture result: LIGHT YEAST (A) 2020 10:01 AM  
 Culture result: LIGHT NORMAL RESPIRATORY TIAN 2020 10:01 AM  
 Culture result: NO GROWTH 5 DAYS 2020 09:52 AM  
 
 
Radiology:  
 
Line/Insert Date:        
 
Assessment:  
 
1. UTI--Serratia (2 biotypes) from culture and small amount of Enterococcus--now with Pseudomonas from culture of urine and blood 2. CHF/cardiomyopathy 3. ?aspiration event(s) 4. Renal insufficiency 5. Respiratory difficulties 6. Leukopenia Objective: 1. Discontinue Levaquin and observe WBC We will check back on Monday. Please call with questions tomorrow Veronica Jackson MD

## 2020-01-18 NOTE — PROGRESS NOTES
1945: Bedside and Verbal shift change report given to Claudette Valreo RN (oncoming nurse) by Cheryl Harrison RN (offgoing nurse). Report included the following information SBAR, Kardex, ED Summary, Procedure Summary, Intake/Output, MAR, Recent Results and Cardiac Rhythm Paced.

## 2020-01-19 NOTE — PROGRESS NOTES
0730: Bedside shift change report given to [employer] (oncoming nurse) by Shayy Sterling (offgoing nurse). Report included the following information SBAR and Kardex. Dentures accidentally dropped over night. Director Ace Nuñez, nursing supervisor aware. Patient advocacy to see wife and patient tmr morning. anjelica to see patient in am. Broken dentures in room. Patient on mechanical soft diet. Patient still with tremors and severe anxiety. Given one time order klopinon and patient feeling a little better. Will given another dose for bedtime and allow for patient to sleep. 1930: Bedside shift change report given to Shayy Sterling (oncoming nurse) by Margaux Campuzano RN (offgoing nurse). Report included the following information SBAR and Kardex. Problem: Falls - Risk of 
Goal: *Absence of Falls Description Document Wally Trujillo Fall Risk and appropriate interventions in the flowsheet. Outcome: Progressing Towards Goal 
Note: Fall Risk Interventions: 
Mobility Interventions: Communicate number of staff needed for ambulation/transfer Mentation Interventions: Door open when patient unattended Medication Interventions: Patient to call before getting OOB, Evaluate medications/consider consulting pharmacy Elimination Interventions: Call light in reach History of Falls Interventions: Door open when patient unattended Problem: Diabetes Self-Management Goal: *Disease process and treatment process Description Define diabetes and identify own type of diabetes; list 3 options for treating diabetes.  
Outcome: Progressing Towards Goal

## 2020-01-19 NOTE — PROGRESS NOTES
[address] [facility] [address] in [place], [place] Inpatient Progress Note Patient name: Natalie Lofton Patient : 1950 Patient MRN: [id number] Attending MD: Nisha Payton MD 
Date of service: 20 Chief Complaint:  
Alissa Banregis azucena LVAD implant 
  
HPI: Sona Kiser is a 71y.o. year old pleasant white male with a history of HTN, HLD, JOSE, CAD s/p cardiac arrest VFib s/p CABG () c/b sternal would infection and sternectomy, ischemic cardiomyopathy LVEF 15-20%, s/p ICD and with LBBB. He was admitted with acute on chronic systolic heart failure with massive volume overload > 20 lbs, in the setting of atrial fibrillation s/p failed DCCV and underwent DCCV and RHC on .  S/p BiVICD on 19 with Dr. Louise Wisdom. He was discharged home home on IV milrinone on 19. Tere Barajas has been followed closely by Dr. Vargas Figueroa and the [facility]. 
  
Mr. Kiser was admitted for acute on chronic systolic heart failure. He underwent implantation of Impella 5.0 due to CS and has completed his LVAD evaluation. Tere Barajas meets criteria for implant of HM3 as DT. He was NYHA Class IV prior to implant of Impella 5.0, has LVEF < 15%, was intolerant of GDMT due to symptomatic hypotension and renal dysfunction. He remains dependent on temporary MCS support. RHC  revealed compensated hemodynamics on Impella. His renal function has improved dramatically with improvement in his hemodynamics. He is not a suitable transplant candidate due to single kidney, sternectomy, debility, and frailty. He was reviewed by [address] [place] [address] and felt to be a high risk heart transplant candidate due to multiple co-morbidities as well as the afore mentioned conditions.  He remained in the CCU and underwent a HM 3 implant as DT on . He was weaned off of pressors and transferred to [place] [address] where he continues to undergo PT/OT and hemodynamic optimization.   
  
24Hr Events:  
Did not sleep last night Dyspneic- received Bumex overnight Dentures broken Increased tremors Anxious Procedure:  Procedure(s): REMOVAL TEMPORARY LEFT VENTRICULAR ASSIST DEVICE, IMPLANTATION OF LEFT VENTRICULAR ASSIST DEVICE PERMANENT (VAD), ECC, JACQUE, EPI AORTIC US BY DR Mame Arroyo.    
POD:  62 
  
Impression / Plan:  
Heart Failure Status: NYHA Class IV, improved to NYHA Class III Chronic systolic heart failure due to ICM, NYHA Class IV, EF < 15%, cardiogenic shock bridged with Impella 5.0 support to HM 3 implant S/p Impella removal and LVAD implant 11/18/19 RPMs stable at 6700 Multiple PI events on interrogation - Increased Discontinued Sildenafil due to leukopenia Intolerant of BB due to RV failure Intolerant of ACEi/ARB/ARNI/AA due to JUAN J on CKD 3 Intolerant of spironolactone d/t IVVD Discontinue midodrine, MAPs >80 No diuretics today Strict I/O, daily weights Continue LVAD education Dressing change frequency three times weekly, sutures removed 12/26/19 Ramp test this week, set speed increased to 6700 Chest CTA- no outflow graft occlusion Bacteremia - Pseudomonas Blood cultures (12/31/19) 2/4 bottles +Pseudomonas & 2/4 bottles GNRs Repeat BC NGTD On IV Cefepime Stopped Levaquin d/t leukopenia Follow procalcitonin and lactic acid levels - both improving Repeat sputum- few yeast, normal soren Repeat UA negative Orthostatic Hypotension D/C midodrine- MAPs 's Cont Florinef 0.1 mg daily Stim test unremarkable Leukopenia Check Manual Diff Peripheral smear-(1/15/2020) Pancytopenia without dysmyelopoiesis, dyserythropoiesis or platelet aggregation Hematology recommendations appreciated- likely nutritional or medications Stopped allopurinol, revatio, klonopin. D/C Levaquin, cont Cefepime per ID Thrombocytopenia Peripheral smear-(1/15/2020) Pancytopenia without dysmyelopoiesis, dyserythropoiesis or platelet aggregation Plt trending down, 68 today Hematology following, appreciate assistance May be secondary to nutritional deficiency HIT negative Generalized myoclonus Increased Appreciate Neurology input Restarted Keppra 250mg BID- monitor for dizziness May consider Olanzapine or Risperdal 
Head CT negative for acute process EEG suggestive of mild generalized encephalopathic process, possibly related to underlying structural brain injury vs metabolic abnormality Monitor closely Off Digoxin  
  
Anticoagulation for LVAD, INR goal 1.5-2 Goal decreased d/t persistent hematuria No ASA d/t hematuria INR 2.3 Coumadin - 1mg tonight Acute Respiratory Failure post op Improved Wean FiO2 Pulmonary hygiene - recommend RT use flutter valve with nebs & Incentive spirometer Cont home CPAP- must use while sleeping Acute on CKD 3, atrophic left kidney Improved Appreciate Nephrology assistance- signed off Watch Cr, stable Avoid nephrotoxins, trend labs  
  
CAD s/p CABG Off ASA d/t hematuria No BB d/t RV failure Hold statin due to recent hepatic failure LHC performed 11/13 - low likelihood of benefit from revascularization  
  
Hx of VF arrest s/p BiV-ICD No recent shocks Keep K > 4 and Mg > 2  
Mag ox 800 mg po BID- watch for diarrhea Potassium 20mEq BID ICD reprogrammed to reduce diaphragmatic stimulation IV mag 1g today Extra Potassium 20mEq today PAF Off Dig due to lethargy and tremors No BB due to RV failure Repeat TFTs WNL 
  
Hx of gout Uric acid WNL Acute blood loss anemia Improved Likely due to hematuria Cont octreotide 25 mcg SQ TID Fecal occult 12/14 negative, positive on 12/17 Appreciate urology recommendations 
  
Urinary retention, c/b serratia UTI and hematuria Resolved Repeat UA with cx negative 1/13/20 Cystoscopy performed 11/13 shows catheter induced cystitis Pseudomonas aeruginosa positive UA culture (12/31/19) - on Cefepmine and levaquin Severe Acute on Chronic Protein Calorie Malnutrition Eating better Prealbumin weekly - 17 on 1/13 Appreciate Nutritionist assistance Diet as tolerated, encourage food from home, protein shakes Intolerant of mirtazapine d/t tremors, confusion Cont MVI add thiamine 100mg daily Staff to assist with meals- dentures broke today COPD Appreciate Pulmonology assistance Continue nebs PRN   
  
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  
  
Hx of sternal wound infection, sternectomy Sternum closed post op- monitor Delayed skin healing mid portion of sternotomy - evaluated by Dr. Benjamín Kelly, will continue IV abx and wet to dry dressings. Will likely require sternal wire after ~3 months from op date Vocal cord paralysis Improved ENT recs appreciated Speech therapy following - appreciate input Anxiety Increased today Resume klonopin 0.5mg QHS prn- monitor WBC's  
Monitor Depression Cont Effexor to 150 mg PO qAM  
Monitor rhythm strips for prolonged QTc 
  
Debility Continue PT/OT  
OOB at least x3 daily/ all meals Bladder spasms Received pyridium 100mg x4 doses Oxybutynin 5mg Q6hr prn  
  
Ppx Protonix PT/OT  
+daily BM  
PICC placed 11/22/19 Do Not Disturb at night from 10p-6a 
  
Dispo: 
Remain in [facility] at this time Will need IP rehab. Not ready for discharge at this time LVAD INTERROGATION: 
Device interrogated in person Device function normal, normal flow, Increased PI events LVAD Pump Speed (RPM): 6700 Pump Flow (LPM): 6.6 MAP: 99 
PI (Pulsitility Index): 3.3 Power: 6.2  Test: Yes 
Back Up  at Bedside & Labeled: Yes Power Module Test: Yes Driveline Site Care: No 
Driveline Dressing: Clean, Dry, and Intact Outpatient: No 
MAP in Therapeutic Range (Outpatient): Yes Testing Alarms Reviewed:  Yes 
 Back up SC speed: 6700 Back up Low Speed Limit: 6000 Emergency Equipment with Patient?: Yes Emergency procedures reviewed?: Yes Drive line site inspected?: No 
Drive line intergrity inspected?: Yes Drive line dressing changed?: Yes PHYSICAL EXAM: 
Visit Vitals BP 91/72 (BP 1 Location: Left arm, BP Patient Position: At rest) Pulse 80 Temp 98.1 °F (36.7 °C) Resp 18 Ht 6' 2\" (1.88 m) Wt 172 lb 9.9 oz (78.3 kg) SpO2 99% BMI 22.16 kg/m² Physical Exam  
Constitutional: He is oriented to person, place, and time. He appears well-developed. He appears cachectic. No distress. HENT:  
Head: Normocephalic. Neck: Normal range of motion. Neck supple. Cardiovascular: Normal rate, regular rhythm and normal heart sounds. + VAD hum Pulmonary/Chest: Effort normal. Tachypnea noted. No respiratory distress. He has rhonchi. Abdominal: Soft. Bowel sounds are normal. He exhibits no distension. Musculoskeletal: Normal range of motion. General: No edema. Neurological: He is alert and oriented to person, place, and time. He displays tremor. More lethargic today Skin: Skin is warm and dry. Psychiatric: His speech is normal and behavior is normal. His mood appears anxious. ~1 cm x 1 cm x 0.25 cm area of delayed closure on sternotomy. Site clean, no erythema or drainage noted. Subcutaneous tissue noted. Nursing note and vitals reviewed. REVIEW OF SYSTEMS: 
Review of Systems Constitutional: Positive for malaise/fatigue. Negative for chills and fever. Tired HENT: Positive for hearing loss. Negative for nosebleeds. Eyes: Negative. Respiratory: Positive for shortness of breath. Negative for cough. Cardiovascular: Positive for orthopnea and leg swelling. Negative for chest pain and palpitations. Orthostatic Gastrointestinal: Negative for heartburn, nausea and vomiting. Genitourinary: Negative for dysuria and hematuria. Musculoskeletal: Negative. Neurological: Positive for dizziness, tremors and weakness. Negative for headaches. Mild dizziness - improving Endo/Heme/Allergies: Does not bruise/bleed easily. Psychiatric/Behavioral: The patient is nervous/anxious. PAST MEDICAL HISTORY: 
Past Medical History:  
Diagnosis Date  Degenerative disc disease, lumbar  Heart failure ([facility]  High cholesterol  Hypertension  Paroxysmal atrial fibrillation ([facility] 4/2/2019  Spinal stenosis PAST SURGICAL HISTORY: 
Past Surgical History:  
Procedure Laterality Date  COLONOSCOPY Left 11/11/2019 COLONOSCOPY at bedside performed by Heather Mahan MD at [address]  HX CORONARY ARTERY BYPASS GRAFT    
 triple  HX HERNIA REPAIR    
 HX IMPLANTABLE CARDIOVERTER DEFIBRILLATOR  NV CARDIOVERSION ELECTIVE ARRHYTHMIA EXTERNAL N/A 6/10/2019 EP CARDIOVERSION performed by Magen Cash MD at [address] CATH LAB  NV CARDIOVERSION ELECTIVE ARRHYTHMIA EXTERNAL N/A 6/18/2019 EP CARDIOVERSION performed by Aminta Stout MD at [address] CATH LAB  NV INSJ/RPLCMT PERM DFB W/TRNSVNS LDS 1/DUAL CHMBR N/A 6/21/2019 INSERT ICD BIV MULTI performed by Manuela Trinidad MD at [address] CATH LAB  NV TCAT IMPL WRLS P-ART PRS SNR L-T HEMODYN MNTR N/A 9/18/2019 IMPLANT HEART FAILURE MONITORING DEVICE performed by Ivan Motley MD at [address] CATH LAB FAMILY HISTORY: 
Family History Problem Relation Age of Onset  Lung Disease Mother  Hypertension Mother Canseco Arthritis-osteo Mother  Heart Disease Mother  Heart Disease Father  Diabetes Father SOCIAL HISTORY: 
Social History Socioeconomic History  Marital status:  Spouse name: Not on file  Number of children: Not on file  Years of education: Not on file  Highest education level: Not on file Tobacco Use  Smoking status: Former Smoker Last attempt to quit: 2010 Years since quittin.1  Smokeless tobacco: Never Used Substance and Sexual Activity  Alcohol use: Not Currently Comment: rarely  Drug use: Never Other Topics Concern LABORATORY RESULTS: 
  
Labs Latest Ref Rng & Units 2020 2020 2020 2020 1/15/2020 1/15/2020 2020 WBC 4.1 - 11.1 K/uL 3. 3(L) 2. 6(L) 2. 5(L) 2. 4(L) - 2. 4(L) 2. 8(L)  
RBC 4.10 - 5.70 M/uL 3.32(L) 3.11(L) 3.25(L) 3.22(L) - 3.04(L) 3.05(L) Hemoglobin 12.1 - 17.0 g/dL 9.8(L) 9.2(L) 9.5(L) 9.5(L) - 9. 0(L) 9.1(L) Hematocrit 36.6 - 50.3 % 32. 2(L) 29. 5(L) 30. 8(L) 30. 5(L) - 29. 2(L) 29. 4(L) MCV 80.0 - 99.0 FL 97.0 94.9 94.8 94.7 - 96.1 96.4 Platelets 233 - 742 K/uL 69(L) 68(L) 78(L) 83(L) - 90(L) 94(L) Lymphocytes 12 - 49 % 11(L) 19 10(L) - - - 8(L) Monocytes 5 - 13 % 5 12 6 - - - 3(L) Eosinophils 0 - 7 % 2 4 2 - - - 0 Basophils 0 - 1 % 0 1 0 - - - 0 Bands 0 - 6 % 2 2 3 - - - 0 Blasts 0 % 0 0 0 - - - 0 Albumin 3.5 - 5.0 g/dL 2. 1(L) 2. 1(L) 2. 2(L) 2. 0(L) - 2. 1(L) -  
Calcium 8.5 - 10.1 MG/DL 8.6 8.4(L) 8.4(L) 8. 1(L) - 7. 9(L) -  
SGOT 15 - 37 U/L 29 31 38(H) 57(H) - 64(H) -  
Glucose 65 - 100 mg/dL 105(H) 92 103(H) 114(H) - 91 -  
BUN 6 - 20 MG/DL 25(H) 25(H) 23(H) 23(H) - 24(H) -  
Creatinine 0.70 - 1.30 MG/DL 1.00 0.99 0.97 1.02 - 1.04 - Sodium 136 - 145 mmol/L 134(L) 133(L) 131(L) 132(L) - 131(L) - Potassium 3.5 - 5.1 mmol/L 3.9 4.1 3.8 3. 2(L) - 3. 2(L) -  
TSH 0.36 - 3.74 uIU/mL - - - - - - -  
LDH 85 - 241 U/L 274(H) 251(H) 252(H) 253(H) 224 236 - Some recent data might be hidden Lab Results Component Value Date/Time TSH 2.30 2019 03:29 AM  
 TSH 2.40 10/25/2019 07:39 PM  
 TSH 2.45 2019 04:16 AM  
 
 
ALLERGY: 
No Known Allergies CURRENT MEDICATIONS: 
Current Facility-Administered Medications Medication Dose Route Frequency  acetylcysteine (MUCOMYST) 200 mg/mL (20 %) solution 1,200 mg  1,200 mg Nebulization TID  clonazePAM (KlonoPIN) tablet 0.5 mg  0.5 mg Oral QHS PRN  
 levETIRAcetam (KEPPRA) tablet 250 mg  250 mg Oral BID  clonazePAM (KlonoPIN) tablet 0.5 mg  0.5 mg Oral ONCE  warfarin (COUMADIN) tablet 1 mg  1 mg Oral ONCE  potassium chloride SR (KLOR-CON 10) tablet 20 mEq  20 mEq Oral BID  senna-docusate (PERICOLACE) 8.6-50 mg per tablet 1 Tab  1 Tab Oral PRN  
 collagenase (SANTYL) 250 unit/gram ointment   Topical DAILY  fludrocortisone (FLORINEF) tablet 0.1 mg  0.1 mg Oral DAILY  cholecalciferol (VITAMIN D3) (1000 Units /25 mcg) tablet 2 Tab  2,000 Units Oral DAILY  venlafaxine-SR (EFFEXOR-XR) capsule 150 mg  150 mg Oral DAILY WITH BREAKFAST  hydrocortisone (CORTAID) 1 % cream   Topical TID PRN  thiamine HCL (B-1) tablet 100 mg  100 mg Oral DAILY  cefepime (MAXIPIME) 2 g in 0.9% sodium chloride (MBP/ADV) 100 mL  2 g IntraVENous Q8H  
 oxybutynin (DITROPAN) tablet 5 mg  5 mg Oral Q6H PRN  
 magnesium oxide (MAG-OX) tablet 800 mg  800 mg Oral BID  lidocaine (URO-JET/ GLYDO) 2 % jelly   Urethral PRN  
 epoetin yvonne-epbx (RETACRIT) injection 20,000 Units  20,000 Units SubCUTAneous Q MON, WED & FRI  albuterol-ipratropium (DUO-NEB) 2.5 MG-0.5 MG/3 ML  3 mL Nebulization Q6H PRN  therapeutic multivitamin (THERAGRAN) tablet 1 Tab  1 Tab Oral DAILY  alteplase (CATHFLO) 1 mg in sterile water (preservative free) 1 mL injection  1 mg InterCATHeter PRN  finasteride (PROSCAR) tablet 5 mg  5 mg Oral DAILY  diphenhydrAMINE (BENADRYL) capsule 25 mg  25 mg Oral QHS PRN  
 octreotide (SANDOSTATIN) injection 25 mcg  25 mcg SubCUTAneous TID  pantoprazole (PROTONIX) tablet 40 mg  40 mg Oral ACB  arformoterol (BROVANA) neb solution 15 mcg  15 mcg Nebulization BID RT And  
 budesonide (PULMICORT) 500 mcg/2 ml nebulizer suspension  500 mcg Nebulization BID RT  
 lactobac ac& pc-s.therm-b.anim (TIAN Q/RISAQUAD)  1 Cap Oral DAILY  oxyCODONE IR (ROXICODONE) tablet 5 mg  5 mg Oral Q6H PRN  
 tamsulosin (FLOMAX) capsule 0.4 mg  0.4 mg Oral DAILY  Warfarin MD/NP dosing   Other PRN  
 sodium chloride (NS) flush 5-40 mL  5-40 mL IntraVENous Q8H  
 sodium chloride (NS) flush 5-40 mL  5-40 mL IntraVENous PRN  
 acetaminophen (TYLENOL) tablet 650 mg  650 mg Oral Q6H PRN  
 naloxone (NARCAN) injection 0.4 mg  0.4 mg IntraVENous PRN  
 ondansetron (ZOFRAN) injection 4 mg  4 mg IntraVENous Q4H PRN  
 sucralfate (CARAFATE) tablet 1 g  1 g Oral AC&HS  influenza vaccine 2019-20 (6 mos+)(PF) (FLUARIX/FLULAVAL/FLUZONE QUAD) injection 0.5 mL  0.5 mL IntraMUSCular PRIOR TO DISCHARGE  hydrALAZINE (APRESOLINE) 20 mg/mL injection 20 mg  20 mg IntraVENous Q6H PRN Thank you for allowing me to participate in this patient's care. TIERRA Hammer [address] [place] [address], [address] Phone: [phone] Fax: [phone]

## 2020-01-19 NOTE — CONSULTS
[place] Name:  Melissa Méndez 
MR#:  [id number] :  1950 ACCOUNT #:  [de-identified] DATE OF SERVICE:  2020 REQUESTING PHYSICIAN:  Dr. Fernando Weathers. REASON FOR EVALUATION:  Tremors. HISTORY OF PRESENT ILLNESS:  The patient is a 43-year-old pleasant white male with history of hypertension, hyperlipidemia, obstructive sleep apnea, coronary artery disease status post cardiac arrest, V-fib status post CABG, ischemic cardiomyopathy status post ICD who had LVAD placed in 10/2019. Since the procedure, he has been noticing tremors in his extremities. He was last evaluated by me a few months ago and at that time he was suspected to have myoclonic-type tremors and was tried on Keppra. Apparently, it helped but he is not taking that anymore. He is currently admitted for CHF exacerbation and has had acute renal insufficiency along with volume overload, which has been improving. He has had worsening of his tremor over the past few days, which is significantly affecting his ADLs such as eating and movements that require coordination. He has slow, non-purposeful irregular movements that involve his head, neck, upper and lower extremities. Denies any headache, changes in speech, visual acuity or strength. PAST MEDICAL HISTORY:  As mentioned above. ALLERGIES:  NONE. HOME MEDICATIONS: 
1. Amiodarone. 2.  Eliquis. 3.  Aspirin. 4.  Bumex. 5.  Coreg. 6.  Repatha. 7.  Entresto. 8.  Aldactone. SOCIAL HISTORY:  No history of smoking or alcohol use. FAMILY HISTORY:  Noncontributory. REVIEW OF SYSTEMS:  As mentioned above. PHYSICAL EXAMINATION: 
GENERAL:  The patient is sitting in bed, in no acute distress. VITAL SIGNS:  Temperature 97.8, pulse 89, respirations 20. He has LVAD. NEUROLOGIC:  Pupils are equal, round and reactive. Extraocular movements are full. Face is symmetric. Tongue is midline. Hearing is baseline. Muscle tone and bulk are normal.  He is noted to have slow, irregular, medium-to-high amplitude involuntary movements involving his both upper extremities and it gets worse when they are outstretched. Some side-to-side movements are also noted in his head and neck. Subtle involuntary movement is seen in the lower extremities as well and it is affecting his ability to walk/balance. Deep tendon reflexes are 2/2 and symmetric. Toes are downgoing. Sensation is intact. Gait exam is deferred. HEART:  Regular rate. CHEST:  Bibasilar crackles. EXTREMITIES:  2+ edema. LABORATORY DATA:  CBC with WBC 3.3, hemoglobin 9.8, hematocrit is 32.2, platelets 69. Chemistry:  Sodium 134, potassium 3.9, BUN 25, creatinine 1.0. AST 29, ALT 27.  CT scan of the brain done in 12/2019 did not show any acute process. There is atrophy and white matter disease. ASSESSMENT AND PLAN:  A 70-year-old male with multiple medical issues including acute-on-chronic congestive heart failure, status post left ventricular assist device who appears to be having choreiform movements/dystonic-appearing tremors involving the head, neck and all extremities. I suspect that this could be due to vascular/ischemic changes in the deep brain structure/basal ganglia. We cannot have MRI for further detailed evaluation. Recommend retrying Keppra 250 mg twice daily, and if it does not help, we may need to consider dopamine antagonist such as olanzapine or Risperdal.  We will follow along. Thank you for this consultation. Carie Rain MD 
 
 
[id number] 
D:  01/19/2020 13:08 
T:  01/19/2020 14:56 JOB #:  [id number]

## 2020-01-19 NOTE — PROGRESS NOTES
1930: Bedside and Verbal shift change report given to Claudette Valero RN (oncoming nurse) by Thu Todd RN (offgoing nurse). Report included the following information SBAR, Kardex and Recent Results. 2200: Patient still c/o SOB, and appears to be having a harder time trying to breath. Patient said CPAP did not make it better, seemed to make it worse. O2 sats >95% on 2L. Patient just received prn breathing treatment. RN assessed lung sounds, sound coarse and wet. Paged on-call. [id number] Douglass with on-call physician. Orders received for 1mg bumex, stat chest x-ray. 2300: Reassessed patient. Patient appears to be breathing easier. Patient states he feels a little better. Voiding fine.

## 2020-01-19 NOTE — CONSULTS
Consult dictated. 71year-old status post LVAD for CHF who appears to be having choreiform movements/dystonic appearing tremors involving the head, neck and all extremities. I suspect that this could be due to vascular/ ischemic changes in deep brain structures/basal ganglia. He cannot have MRI. Recommend retrying Keppra 250 mg twice daily and if it does not help, we may consider dopamine antagonist such as olanzapine or Helen Garcia MD

## 2020-01-20 PROBLEM — R25.1 TREMOR: Status: ACTIVE | Noted: 2020-01-01

## 2020-01-20 NOTE — PROGRESS NOTES
Problem: Self Care Deficits Care Plan (Adult) Goal: *Acute Goals and Plan of Care (Insert Text) Description FUNCTIONAL STATUS PRIOR TO ADMISSION: Patient was modified independent using a single point cane for functional mobility. Patient able to shower and dress himself. However, patient required assistance for household management from his wife. HOME SUPPORT: The patient lived alone with wife to provide assistance. Occupational Therapy Goals all updated/continued as follows 1/17/2020 1. Patient will perform upper body dressing including management of LVAD with supervision/setup within 7 day(s) 2. Patient will perform lower body dressing with RW CGA within 7 day(s). -continue goal (MOD A simulated 1/17) 3. Patient will perform grooming standing with RW 2 mins with supervision/setup within 7 day(s). -continue goal (CGA 1/17) 4. Patient will perform toilet transfers with RW supervision/setup using LRAD within 7 day(s). 5.  Patient will perform all aspects of toileting with RW with minimal assistance within 7 day(s). 6.  Patient will perform gathering ADL items high and waist height 2/2 with RW supervision within 7 days. -continue goal (CGA 1/17) Occupational Therapy Goals all updated 1/10/2020 1. Patient will perform upper body dressing including management of LVAD with min A within 7 day(s) 2. Patient will perform lower body dressing with RW CGA within 7 day(s). 3.  Patient will perform grooming standing with RW 2 mins with supervision/setup within 7 day(s). 4.  Patient will perform toilet transfers with RW minimum assistance within 7 day(s). 5.  Patient will perform all aspects of toileting with RW with moderate assistance within 7 day(s). 6.  Patient will perform gathering ADL items high and waist height 2/2 with RW supervision within 7 days. Continue all goals 10/30/19 post re-eval for Impella removal  
Initiated 10/28/2019 1.  Patient will perform bathing with supervision/set-up within 7 day(s). 2.  Patient will perform lower body dressing with supervision/set-up within 7 day(s). 3.  Patient will perform grooming with modified independence within 7 day(s). 4.  Patient will perform toilet transfers with modified independence within 7 day(s). 5.  Patient will perform all aspects of toileting with supervision/set-up within 7 day(s). 6.  Patient will participate in upper extremity therapeutic exercise/activities with supervision/set-up for 5 minutes within 7 day(s). 7.  Patient will utilize energy conservation techniques during functional activities with verbal cues within 7 day(s). Outcome: Progressing Towards Goal 
 OCCUPATIONAL THERAPY TREATMENT Patient: Wicho Link (75 y.o. male) Date: 1/20/2020 Diagnosis: Acute decompensated heart failure ([facility] [address] [I50.9] <principal problem not specified> Procedure(s) (LRB): LEFT BRACHIAL THROMBECTOMY (Left) 56 Days Post-Op Precautions: Fall Chart, occupational therapy assessment, plan of care, and goals were reviewed. ASSESSMENT Patient continues with skilled OT services and is minimally progressing towards goals at this time due to orthostatic hypotension, generalized weakness, decreased endurance, decreased activity tolerance, impaired balance, and impaired coordination with patient presenting with increased BUE tremors today. Patient initially with MAP of 92 supine in bed. MAP then lowered to 78 seated EOB. Patient completed 6/6 BUE cardiac exercises seated with MAP checked again and remained at 78. Patient then performed LVAD switchover x MOD A with poor FM coordination for management of power leads. Patient diaphoretic and endorsing increased dizziness. MAP 60. Patient switched back to wall power and was left supine with call bell in reach. Noted tachycardia in session with RN notified.  Provided cold wash cloth for patient's face. Continue to recommend IPR when patient is medically stable as patient is highly motivated to regain safety and independence with functional tasks. Patient is verbalizing and is demonstrating understanding of mindful-based movements (\"move in the tube\") principles of keeping UEs proximal to ribcage to prevent lateral pull on the sternum during load-bearing activities with verbal and tactile cues required for compliance. Current Level of Function Impacting Discharge (ADLs): MOD A UB ADLs, MOD A LB ADLs Other factors to consider for discharge: LVAD HM III; orthostatic hypotension and tachycardia today PLAN : 
Patient continues to benefit from skilled intervention to address the above impairments. Continue treatment per established plan of care. to address goals. Recommend with staff: OOB x 3 to chair if hemodynamically stable; BUE cardiac exercises Recommend next OT session: fine motor activities Recommendation for discharge: (in order for the patient to meet his/her long term goals) Therapy 3 hours per day 5-7 days per week This discharge recommendation: 
Has been made in collaboration with the attending provider and/or case management IF patient discharges home will need the following DME: TBD SUBJECTIVE:  
Patient stated I feel awful.  OBJECTIVE DATA SUMMARY:  
Cognitive/Behavioral Status: 
Neurologic State: Alert Orientation Level: Oriented X4 Cognition: Appropriate for age attention/concentration Perception: Appears intact Perseveration: No perseveration noted Safety/Judgement: Decreased insight into deficits; Decreased awareness of need for safety;Decreased awareness of need for assistance Functional Mobility and Transfers for ADLs: 
Bed Mobility: 
Supine to Sit: Minimum assistance;Assist x1;Additional time Sit to Supine: Minimum assistance;Assist x2; Additional time; Moderate assistance Scooting: Minimum assistance;Assist x1;Additional time Balance: 
Sitting: Impaired; Without support Sitting - Static: Fair (occasional) Sitting - Dynamic: Fair (occasional) ADL Intervention: 
  
 
  
 
  
 
  
 
Upper Body Dressing Assistance Dressing Assistance: Moderate assistance Patient demonstrated switchover from power module to battery in prep for ADL routine with MOD A. Demonstrated the following tasks:  
 Independent Verbal cues Physical assistance Comments Check PM & SC  x Ensure  clipped onto stabilization belt  x Remove front (green lighted) batteries from , place back batteries into front slots   x Check batteries  x Position batteries into battery clips x Don holster vest  x Disconnect power leads   x Insert power lead into battery clip  x [place] batteries in holster  x Secure batteries with Velcro and clips  x  Zippers with zip ties Cognitive Retraining Safety/Judgement: Decreased insight into deficits; Decreased awareness of need for safety;Decreased awareness of need for assistance Therapeutic Exercises:  
Patient instructed on the benefits and demonstrated cardiac exercises while seated with Stand-by assistance. Instructed and indicated understanding on how to progress reps, sets against gravity, pacing through progressive muscle strengthening standing based on surgeon clearance for more weight in prep for basic and instrumental ADLs. Instruction on the use of household items in place of weights as needed. CARDIAC EXERCISE Sets Reps Active  Active Assist   
Passive  Self ROM Comments Shoulder flexion  1  5   [x]                            []                             []                             []                               
Shoulder abduction  1  5  [x]                             []                             []                             []                               
 Scapular elevation  1  5  [x]                             []                              []                             []                               
Scapular retraction  1  5  [x]                             []                             []                             []                               
Trunk rotation  1  5  [x]                             []                             []                             []                               
Trunk sidebending  1  5  [x]                             []                              []                             []                                     
 
 
Activity Tolerance:  
Poor, requires rest breaks, and signs and symptoms of orthostatic hypotension Please refer to the flowsheet for vital signs taken during this treatment. After treatment patient left in no apparent distress:  
Supine in bed, Call bell within reach, and Caregiver / family present COMMUNICATION/COLLABORATION:  
The patients plan of care was discussed with: Physical Therapist and Registered Nurse Girish Cho Time Calculation: 41 mins

## 2020-01-20 NOTE — PROGRESS NOTES
[facility] VAD/Heart Failure Progress Note Admit Date: 10/25/2019 POD:  56 Days Post-Op Procedure:  Procedure(s): LEFT BRACHIAL THROMBECTOMY Subjective: Dyspnea, fatigue, and weakness; sleepy at times Objective:  
Vitals: 
Blood pressure 91/72, pulse 84, temperature 97.5 °F (36.4 °C), resp. rate 18, height 6' 2\" (1.88 m), weight 175 lb 0.7 oz (79.4 kg), SpO2 97 %. Temp (24hrs), Av.8 °F (36.6 °C), Min:96.6 °F (35.9 °C), Max:98.7 °F (37.1 °C) Hemodynamics: 
 CO: CO (l/min): 5.8 l/min CI: CI (l/min/m2): 2.8 l/min/m2 CVP: CVP (mmHg): 4 mmHg (19 1645) SVR: SVR (dyne*sec)/cm5: 1080 (dyne*sec)/cm5 (19 1200) PAP Systolic: PAP Systolic: 34 ( 5309) PAP Diastolic: PAP Diastolic: 13 ( 7332) PVR:   
 SV02: SVO2 (%): 67 % (19 1300) SCV02: SCVO2 (%): 75 % (10/29/19 1900) VAD Interrogation: LVAD (Heartmate) Pump Speed (RPM): 6700 Pump Flow (LPM): 5.9 Chatter in Lines: No 
PI (Pulsitility Index): 4.3 Power: 6.2 MAP: 108  Test: No 
Back Up  at Bedside & Labeled: No 
Power Module Test: No 
Driveline Site Care: Yes Driveline Dressing: Changed per order EKG/Rhythm:   
 
Extubation Date / Time:  
 
CT Output:  
 
Ventilator: 
Ventilator Volumes Vt Set (ml): 550 ml (19 08) Vt Exhaled (Machine Breath) (ml): 639 ml (19 0826) Vt Spont (ml): 437 ml (19 1035) Ve Observed (l/min): 7.25 l/min (19 1035) Oxygen Therapy: 
Oxygen Therapy O2 Sat (%): 97 % (20) Pulse via Oximetry: 78 beats per minute (20) O2 Device: Nasal cannula (20) PEEP/CPAP (cm H2O): 4 cm H20 (20) O2 Flow Rate (L/min): 3 l/min (20) FIO2 (%): 21 % (20 09) CXR: 
 
Admission Weight: Last Weight Weight: 192 lb 10.9 oz (87.4 kg) Weight: 175 lb 0.7 oz (79.4 kg) Intake / Yovanny Frees / Jessie Perone: 
Current Shift: 701 - 1900 In: 340 [P.O.:240; I.V.:100] Out: 1000 [Urine:1000] Last 24 hrs.:  
 
Intake/Output Summary (Last 24 hours) at 1/20/2020 1407 Last data filed at 1/20/2020 1248 Gross per 24 hour Intake 680 ml Output 2200 ml Net -1520 ml No results for input(s): CPK, CKMB, TROIQ in the last 72 hours. Recent Labs  
  01/20/20 
1336 01/19/20 
0428 01/18/20 
9343  134* 133* K 4.4 3.9 4.1 CO2 26 25 25 BUN 25* 25* 25* CREA 1.05 1.00 0.99 GLU 93 105* 92 MG 2.1 1.9 1.9 WBC 3.6* 3.3* 2.6* HGB 10.1* 9.8* 9.2* HCT 32.7* 32.2* 29.5* PLT 89* 69* 68* Recent Labs  
  01/20/20 
6039 01/19/20 
0428 01/18/20 
1557 INR 2.1* 2.3* 2.0*  
PTP 20.3* 21.9* 19.3* APTT 38.7* 42.7* 41.6* No lab exists for component: PBNP Current Facility-Administered Medications:  
  acetylcysteine (MUCOMYST) 200 mg/mL (20 %) solution 1,200 mg, 1,200 mg, Nebulization, TID, Luis Miguelliliana Chaudhryath, NP, 1,200 mg at 01/20/20 3249   clonazePAM (KlonoPIN) tablet 0.5 mg, 0.5 mg, Oral, QHS PRN, Luis Miguel Isidoroath, NP, 0.5 mg at 01/20/20 0200   levETIRAcetam (KEPPRA) tablet 250 mg, 250 mg, Oral, BID, Javed Beck MD, 250 mg at 01/20/20 5242   potassium chloride SR (KLOR-CON 10) tablet 20 mEq, 20 mEq, Oral, BID, Levi Shana B, NP, 20 mEq at 01/20/20 0857 
  senna-docusate (PERICOLACE) 8.6-50 mg per tablet 1 Tab, 1 Tab, Oral, PRN, Carolin Patterson, MD, 1 Tab at 01/18/20 7986   collagenase (SANTYL) 250 unit/gram ointment, , Topical, DAILY, Levi, Shana B, NP 
  fludrocortisone (FLORINEF) tablet 0.1 mg, 0.1 mg, Oral, DAILY, Levi, Shana B, NP, 0.1 mg at 01/20/20 0939   cholecalciferol (VITAMIN D3) (1000 Units /25 mcg) tablet 2 Tab, 2,000 Units, Oral, DAILY, Analisa Herrera NP, 2 Tab at 01/20/20 0857 
  venlafaxine-SR (EFFEXOR-XR) capsule 150 mg, 150 mg, Oral, DAILY WITH BREAKFAST, Analisa Herrera NP, 150 mg at 01/20/20 9387   hydrocortisone (CORTAID) 1 % cream, , Topical, TID PRN, Calos Altman MD 
  thiamine HCL (B-1) tablet 100 mg, 100 mg, Oral, DAILY, Nannette Conner NP, 100 mg at 01/20/20 8271   cefepime (MAXIPIME) 2 g in 0.9% sodium chloride (MBP/ADV) 100 mL, 2 g, IntraVENous, Q8H, Juan C Olivares MD, Last Rate: 200 mL/hr at 01/20/20 0857, 2 g at 01/20/20 6070   oxybutynin (DITROPAN) tablet 5 mg, 5 mg, Oral, Q6H PRN, Shana Sims, NP, 5 mg at 01/01/20 1204   magnesium oxide (MAG-OX) tablet 800 mg, 800 mg, Oral, BID, Nannette Conner NP, 800 mg at 01/20/20 3373   lidocaine (URO-JET/ GLYDO) 2 % jelly, , Urethral, PRN, Mounika Altman MD 
  epoetin yvonne-epbx (RETACRIT) injection 20,000 Units, 20,000 Units, SubCUTAneous, Q MON, WED & Dev Quintero MD, 20,000 Units at 01/17/20 2056   albuterol-ipratropium (DUO-NEB) 2.5 MG-0.5 MG/3 ML, 3 mL, Nebulization, Q6H PRN, Margret Jordan MD, 3 mL at 01/20/20 4215   therapeutic multivitamin (THERAGRAN) tablet 1 Tab, 1 Tab, Oral, DAILY, Alexi Simsin LIVIA, NP, 1 Tab at 01/20/20 8682   alteplase (CATHFLO) 1 mg in sterile water (preservative free) 1 mL injection, 1 mg, InterCATHeter, PRN, Mounika Toney MD, 1 mg at 01/20/20 1156   finasteride (PROSCAR) tablet 5 mg, 5 mg, Oral, DAILY, Deneen Romo MD, 5 mg at 01/20/20 0865   diphenhydrAMINE (BENADRYL) capsule 25 mg, 25 mg, Oral, QHS PRN, Alfonso Herrera NP, 25 mg at 01/19/20 0044   octreotide (SANDOSTATIN) injection 25 mcg, 25 mcg, SubCUTAneous, TID, Elsa Herrera NP, 25 mcg at 01/20/20 4367   pantoprazole (PROTONIX) tablet 40 mg, 40 mg, Oral, ACB, Alfonso Herrera NP, 40 mg at 01/20/20 0845 
  arformoterol (BROVANA) neb solution 15 mcg, 15 mcg, Nebulization, BID RT, 15 mcg at 01/20/20 0718 **AND** budesonide (PULMICORT) 500 mcg/2 ml nebulizer suspension, 500 mcg, Nebulization, BID RT, Alfonso Herrera NP, 500 mcg at 01/20/20 6104   lactobac ac& pc-s.therm-b.anim (TIAN Q/RISAQUAD), 1 Cap, Oral, DAILY, Chase Brandon MD, 1 Cap at 01/20/20 7871   oxyCODONE IR (ROXICODONE) tablet 5 mg, 5 mg, Oral, Q6H PRN, Linda Rios MD, 5 mg at 12/30/19 0405   tamsulosin (FLOMAX) capsule 0.4 mg, 0.4 mg, Oral, DAILY, Logan Kincaid MD, 0.4 mg at 01/20/20 0155   Warfarin MD/NP dosing, , Other, PRN, Mounika Altman MD 
  sodium chloride (NS) flush 5-40 mL, 5-40 mL, IntraVENous, Q8H, Harshal Silva MD, 10 mL at 01/20/20 1319 
  sodium chloride (NS) flush 5-40 mL, 5-40 mL, IntraVENous, PRN, Linda Rios MD, 10 mL at 01/20/20 1326   acetaminophen (TYLENOL) tablet 650 mg, 650 mg, Oral, Q6H PRN, Linda Rios MD, 650 mg at 01/12/20 2033 
  naloxone [facility]) injection 0.4 mg, 0.4 mg, IntraVENous, PRN, Linda Rios MD 
  ondansetron [facility]) injection 4 mg, 4 mg, IntraVENous, Q4H PRN, Linda Rios MD, 4 mg at 01/20/20 1318   sucralfate (CARAFATE) tablet 1 g, 1 g, Oral, AC&HS, Linda Rios MD, 1 g at 01/20/20 1213 
  influenza vaccine 2019-20 (6 mos+)(PF) (FLUARIX/FLULAVAL/FLUZONE QUAD) injection 0.5 mL, 0.5 mL, IntraMUSCular, PRIOR TO DISCHARGE, Mounika Altman MD 
  hydrALAZINE (APRESOLINE) 20 mg/mL injection 20 mg, 20 mg, IntraVENous, Q6H PRN, Shana Sims NP, 20 mg at 01/20/20 1213 A/P 
  
S/P LVAD - good flows Need for anti-coagulation - coumadin DM - insulin RV dysfunction - milrinone, diuretics  
  
Risk of morbidity and mortality - high Medical decision making - high complexity Signed By: Roman Vazquez MD

## 2020-01-20 NOTE — PROGRESS NOTES
0750 Bedside and Verbal shift change report given to [facility] (oncoming nurse) by Megan Chadwick. (offgoing nurse). Report included the following information SBAR, Kardex, Intake/Output, MAR, Recent Results and Med Rec Status. 1120 LVAD Driveline dressing change. Site stable. Scant brown drainage. 1215 20mg Hydralazine given for MAP: 108. 
1245 MAP: 90 
 
1325 Alteplase via PICC successful. All ports flush and give blood return. 5 Confirmed Right heart cath with Dr. Clarence Black at 7323 on 1/21/2020 1515 Pt became hypotensive during PT. Orthostatics MAPs: Supine 92, Sitting 78. Standing 60. Pt symptomatic, dizzy, nauseated, pale. Pt returned to supine position, MAP: 90.  
 
1855 Condom Cath changed. 28mm 1940 Bedside and Verbal shift change report given to Marea Halsted (oncoming nurse) by [facility] [facility] (offgoing nurse). Report included the following information SBAR, Kardex, Intake/Output, MAR, Recent Results and Med Rec Status. Problem: Falls - Risk of 
Goal: *Absence of Falls Description Document Asim Rollins Fall Risk and appropriate interventions in the flowsheet. Outcome: Progressing Towards Goal 
Note: Fall Risk Interventions: 
Mobility Interventions: Communicate number of staff needed for ambulation/transfer, Patient to call before getting OOB, Strengthening exercises (ROM-active/passive), Utilize gait belt for transfers/ambulation Mentation Interventions: Adequate sleep, hydration, pain control, Door open when patient unattended, Eyeglasses and hearing aids, Increase mobility, More frequent rounding, Room close to nurse's station, Toileting rounds, Update white board Medication Interventions: Evaluate medications/consider consulting pharmacy, Patient to call before getting OOB, Teach patient to arise slowly, Utilize gait belt for transfers/ambulation Elimination Interventions: Call light in reach, Patient to call for help with toileting needs, Stay With Me (per policy), Toilet paper/wipes in reach, Toileting schedule/hourly rounds History of Falls Interventions: Door open when patient unattended Problem: Patient Education: Go to Patient Education Activity Goal: Patient/Family Education Outcome: Progressing Towards Goal 
  
Problem: Pressure Injury - Risk of 
Goal: *Prevention of pressure injury Description Document Mich Scale and appropriate interventions in the flowsheet. Offload heels Turn approximately every 2 hours Outcome: Progressing Towards Goal 
Note: Pressure Injury Interventions: 
Sensory Interventions: Assess changes in LOC, Assess need for specialty bed, Float heels, Keep linens dry and wrinkle-free, Maintain/enhance activity level, Minimize linen layers, Pad between skin to skin, Pressure redistribution bed/mattress (bed type) Moisture Interventions: Absorbent underpads, Minimize layers, Offer toileting Q_hr Activity Interventions: Chair cushion, Increase time out of bed, Pressure redistribution bed/mattress(bed type), PT/OT evaluation Mobility Interventions: Chair cushion, HOB 30 degrees or less, Pressure redistribution bed/mattress (bed type), Float heels Nutrition Interventions: Document food/fluid/supplement intake Friction and Shear Interventions: Lift sheet, Minimize layers Problem: Patient Education: Go to Patient Education Activity Goal: Patient/Family Education Outcome: Progressing Towards Goal 
  
Problem: Heart Failure: Discharge Outcomes Goal: *Verbalizes understanding and describes prescribed diet Outcome: Progressing Towards Goal 
Goal: *Verbalizes understanding/describes prescribed medications Outcome: Progressing Towards Goal 
Goal: *Describes available resources and support systems Description 
(eg: Home Health, Palliative Care, Advanced Medical Directive) Outcome: Progressing Towards Goal 
  
Problem: LVAD: Discharge Outcomes Goal: *Hemodynamically stable Outcome: Progressing Towards Goal 
Goal: *Tolerating diet Outcome: Progressing Towards Goal 
Goal: *LVAD parameters within set limits Outcome: Progressing Towards Goal

## 2020-01-20 NOTE — PROGRESS NOTES
[address] [facility] in [place], [place] Inpatient Progress Note Patient name: Zay Wheeler Patient : 1950 Patient MRN: [id number] Attending MD: Homero Dance, MD 
Date of service: 20 Chief Complaint:  
Lynita  azucena LVAD implant 
  
HPI: Sona Kiser is a 71y.o. year old pleasant white male with a history of HTN, HLD, JOSE, CAD s/p cardiac arrest VFib s/p CABG () c/b sternal would infection and sternectomy, ischemic cardiomyopathy LVEF 15-20%, s/p ICD and with LBBB. He was admitted with acute on chronic systolic heart failure with massive volume overload > 20 lbs, in the setting of atrial fibrillation s/p failed DCCV and underwent DCCV and RHC on .  S/p BiVICD on 19 with Dr. Nicolas Zazueta. He was discharged home home on IV milrinone on 19. Fausto Ramos has been followed closely by Dr. Roxie Nguyen and the [facility]. 
  
Mr. Kiser was admitted for acute on chronic systolic heart failure. He underwent implantation of Impella 5.0 due to CS and has completed his LVAD evaluation. Fausto Ramos meets criteria for implant of HM3 as DT. He was NYHA Class IV prior to implant of Impella 5.0, has LVEF < 15%, was intolerant of GDMT due to symptomatic hypotension and renal dysfunction. He remains dependent on temporary MCS support. RHC  revealed compensated hemodynamics on Impella. His renal function has improved dramatically with improvement in his hemodynamics. He is not a suitable transplant candidate due to single kidney, sternectomy, debility, and frailty. He was reviewed by Alan Rubi and felt to be a high risk heart transplant candidate due to multiple co-morbidities as well as the afore mentioned conditions.  He remained in the CCU and underwent a HM 3 implant as DT on . He was weaned off of pressors and transferred to [place] [address] where he continues to undergo PT/OT and hemodynamic optimization.   
  
24Hr Events:  
No acute overnight events Remains dizzy, weak Poor appetite Procedure:  Procedure(s): REMOVAL TEMPORARY LEFT VENTRICULAR ASSIST DEVICE, IMPLANTATION OF LEFT VENTRICULAR ASSIST DEVICE PERMANENT (VAD), ECC, JACQUE, EPI AORTIC US BY DR Tl Campoverde.    
POD:  63 
  
Impression / Plan:  
Heart Failure Status: NYHA Class IV, improved to NYHA Class III Chronic systolic heart failure due to ICM, NYHA Class IV, EF < 15%, cardiogenic shock bridged with Impella 5.0 support to HM 3 implant S/p Impella removal and LVAD implant 11/18/19 RPMs stable at 6700 Multiple PI events on interrogation Discontinued Sildenafil due to leukopenia Intolerant of BB due to RV failure Intolerant of ACEi/ARB/ARNI/AA due to JUAN J on CKD 3 Intolerant of spironolactone d/t IVVD Discontinue midodrine, MAPs >80 No diuretics today Strict I/O, daily weights Continue LVAD education Dressing change frequency three times weekly, sutures removed 12/26/19 Ramp test this week, set speed increased to 6700 Chest CTA- no outflow graft occlusion Pet Scan ordered [address] 1/21/20 with VCS, will correlate cardiomems reading, d/w with Dr. Jessy Brunner PYP testing, invitae Bacteremia - Pseudomonas Blood cultures (12/31/19) 2/4 bottles +Pseudomonas & 2/4 bottles GNRs Repeat BC NGTD Stop cefepime- ID to start new coverage Stopped Levaquin d/t leukopenia Follow procalcitonin and lactic acid levels - both improving Repeat sputum- few yeast, normal soren Repeat UA negative Orthostatic Hypotension D/C midodrine- MAPs 's Cont Florinef 0.1 mg daily Stim test unremarkable Leukopenia Check Manual Diff Peripheral smear-(1/15/2020) Pancytopenia without dysmyelopoiesis, dyserythropoiesis or platelet aggregation Hematology recommendations appreciated- likely nutritional or medications Stopped allopurinol, revatio Thrombocytopenia Peripheral smear-(1/15/2020) Pancytopenia without dysmyelopoiesis, dyserythropoiesis or platelet aggregation Plt 89 today Hematology following, appreciate assistance May be secondary to nutritional deficiency HIT negative Generalized myoclonus Increased Appreciate Neurology input Restarted Keppra 250mg BID- monitor for dizziness May consider Olanzapine or Risperdal 
Head CT negative for acute process EEG suggestive of mild generalized encephalopathic process, possibly related to underlying structural brain injury vs metabolic abnormality Monitor closely Off Digoxin  
  
Anticoagulation for LVAD, INR goal 1.5-2 Goal decreased d/t persistent hematuria No ASA d/t hematuria INR 2.1 Coumadin - 1mg tonight Acute Respiratory Failure post op Improved Wean FiO2 Pulmonary hygiene - recommend RT use flutter valve with nebs & Incentive spirometer Cont home CPAP- must use while sleeping Acute on CKD 3, atrophic left kidney Improved Appreciate Nephrology assistance- signed off Watch Cr, stable Avoid nephrotoxins, trend labs  
  
CAD s/p CABG Off ASA d/t hematuria No BB d/t RV failure Hold statin due to recent hepatic failure LHC performed 11/13 - low likelihood of benefit from revascularization  
  
Hx of VF arrest s/p BiV-ICD No recent shocks Keep K > 4 and Mg > 2  
Mag ox 800 mg po BID- watch for diarrhea Potassium 20mEq BID ICD reprogrammed to reduce diaphragmatic stimulation PAF Off Dig due to lethargy and tremors No BB due to RV failure Repeat TFTs WNL 
  
Hx of gout Uric acid WNL Acute blood loss anemia Improved Likely due to hematuria Cont octreotide 25 mcg SQ TID Fecal occult 12/14 negative, positive on 12/17 Appreciate urology recommendations 
  
Urinary retention, c/b serratia UTI and hematuria Resolved Repeat UA with cx negative 1/13/20 Cystoscopy performed 11/13 shows catheter induced cystitis Pseudomonas aeruginosa positive UA culture (12/31/19) Stopped levaquin and cefepime for tremors D/w Dr. Kirti Alfaro, will start other coverage Severe Acute on Chronic Protein Calorie Malnutrition Eating better Prealbumin weekly - 17 on 1/13 Appreciate Nutritionist assistance Diet as tolerated, encourage food from home, protein shakes Intolerant of mirtazapine d/t tremors, confusion Cont MVI add thiamine 100mg daily Staff to assist with meals COPD Appreciate Pulmonology assistance Continue nebs PRN   
  
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  
  
Hx of sternal wound infection, sternectomy Sternum closed post op- monitor Delayed skin healing mid portion of sternotomy - evaluated by Dr. Keon Main, will continue IV abx and wet to dry dressings. Will likely require sternal wire after ~3 months from op date Vocal cord paralysis Improved ENT recs appreciated Speech therapy following - appreciate input Anxiety Resume klonopin 0.5mg QHS prn  
Monitor Depression Cont Effexor to 150 mg PO qAM  
Monitor rhythm strips for prolonged QTc 
  
Debility Continue PT/OT  
OOB at least x3 daily/ all meals Bladder spasms Received pyridium 100mg x4 doses Oxybutynin 5mg Q6hr prn  
  
Ppx Protonix PT/OT  
+daily BM  
PICC placed 11/22/19 Do Not Disturb at night from 10p-6a 
  
Dispo: 
Remain in [facility] at this time Will need IP rehab. Not ready for discharge at this time LVAD INTERROGATION: 
Device interrogated in person Device function normal, normal flow, PI events LVAD Pump Speed (RPM): 6700 Pump Flow (LPM): 5.9 MAP: 100 PI (Pulsitility Index): 4.3 Power: 6.2  Test: No 
Back Up  at Bedside & Labeled: No 
Power Module Test: No 
Driveline Site Care: Yes Driveline Dressing: Changed per order Outpatient: No 
MAP in Therapeutic Range (Outpatient): Yes Testing Alarms Reviewed: Yes 
Back up SC speed: 6700 Back up Low Speed Limit: 6000 Emergency Equipment with Patient?: Yes 
 Emergency procedures reviewed?: Yes Drive line site inspected?: No 
Drive line intergrity inspected?: Yes Drive line dressing changed?: Yes PHYSICAL EXAM: 
Visit Vitals BP 91/72 (BP 1 Location: Left arm, BP Patient Position: At rest) Pulse 84 Temp 97.5 °F (36.4 °C) Resp 18 Ht 6' 2\" (1.88 m) Wt 175 lb 0.7 oz (79.4 kg) SpO2 97% BMI 22.47 kg/m² Physical Exam  
Constitutional: He is oriented to person, place, and time. He appears well-developed. He appears cachectic. No distress. HENT:  
Head: Normocephalic. Neck: Normal range of motion. Neck supple. Cardiovascular: Normal rate, regular rhythm and normal heart sounds. + VAD hum Pulmonary/Chest: Effort normal. Tachypnea noted. No respiratory distress. He has rhonchi. Abdominal: Soft. Bowel sounds are normal. He exhibits no distension. Musculoskeletal: Normal range of motion. General: No edema. Neurological: He is alert and oriented to person, place, and time. He displays tremor. More lethargic today Skin: Skin is warm and dry. Psychiatric: His speech is normal and behavior is normal. His mood appears anxious. ~1 cm x 1 cm x 0.25 cm area of delayed closure on sternotomy. Site clean, no erythema or drainage noted. Subcutaneous tissue noted. Nursing note and vitals reviewed. REVIEW OF SYSTEMS: 
Review of Systems Constitutional: Positive for malaise/fatigue. Negative for chills and fever. Tired HENT: Positive for hearing loss. Negative for nosebleeds. Eyes: Negative. Respiratory: Negative for cough and shortness of breath. Cardiovascular: Negative for chest pain, palpitations, orthopnea and leg swelling. Orthostatic Gastrointestinal: Negative for heartburn, nausea and vomiting. Genitourinary: Negative for dysuria and hematuria. Musculoskeletal: Negative. Neurological: Positive for dizziness, tremors and weakness. Negative for headaches. Mild dizziness - improving Endo/Heme/Allergies: Does not bruise/bleed easily. Psychiatric/Behavioral: The patient is nervous/anxious. PAST MEDICAL HISTORY: 
Past Medical History:  
Diagnosis Date  Degenerative disc disease, lumbar  Heart failure ([facility]  High cholesterol  Hypertension  Paroxysmal atrial fibrillation ([facility] 2019  Spinal stenosis PAST SURGICAL HISTORY: 
Past Surgical History:  
Procedure Laterality Date  COLONOSCOPY Left 2019 COLONOSCOPY at bedside performed by Rogerio Pavon MD at [facility]  HX CORONARY ARTERY BYPASS GRAFT    
 triple  HX HERNIA REPAIR    
 HX IMPLANTABLE CARDIOVERTER DEFIBRILLATOR  MD CARDIOVERSION ELECTIVE ARRHYTHMIA EXTERNAL N/A 6/10/2019 EP CARDIOVERSION performed by Kayley Lilly MD at [place] [address], [address] CATH LAB  MD CARDIOVERSION ELECTIVE ARRHYTHMIA EXTERNAL N/A 2019 EP CARDIOVERSION performed by Kathie Moraes MD at [place] [address], [address] CATH LAB  MD INSJ/RPLCMT PERM DFB W/TRNSVNS LDS 1/DUAL CHMBR N/A 2019 INSERT ICD BIV MULTI performed by Shaylee Espinoza MD at [place] [address], [address] CATH LAB  MD TCAT IMPL WRLS P-ART PRS SNR L-T HEMODYN MNTR N/A 2019 IMPLANT HEART FAILURE MONITORING DEVICE performed by Brigette Carreon MD at [place] [address], [address] CATH LAB FAMILY HISTORY: 
Family History Problem Relation Age of Onset  Lung Disease Mother  Hypertension Mother Nolia Stamp Arthritis-osteo Mother  Heart Disease Mother  Heart Disease Father  Diabetes Father SOCIAL HISTORY: 
Social History Socioeconomic History  Marital status:  Spouse name: Not on file  Number of children: Not on file  Years of education: Not on file  Highest education level: Not on file Tobacco Use  Smoking status: Former Smoker Last attempt to quit: 2010 Years since quittin.1  Smokeless tobacco: Never Used Substance and Sexual Activity  Alcohol use: Not Currently Comment: rarely  Drug use: Never Other Topics Concern LABORATORY RESULTS: 
  
Labs Latest Ref Rng & Units 1/20/2020 1/19/2020 1/18/2020 1/17/2020 1/16/2020 1/15/2020 1/15/2020 WBC 4.1 - 11.1 K/uL 3.6(L) 3. 3(L) 2. 6(L) 2. 5(L) 2. 4(L) - 2. 4(L)  
RBC 4.10 - 5.70 M/uL 3.42(L) 3.32(L) 3.11(L) 3.25(L) 3.22(L) - 3.04(L) Hemoglobin 12.1 - 17.0 g/dL 10. 1(L) 9.8(L) 9.2(L) 9.5(L) 9.5(L) - 9. 0(L) Hematocrit 36.6 - 50.3 % 32. 7(L) 32. 2(L) 29. 5(L) 30. 8(L) 30. 5(L) - 29. 2(L) MCV 80.0 - 99.0 FL 95.6 97.0 94.9 94.8 94.7 - 96.1 Platelets 645 - 023 K/uL 89(L) 69(L) 68(L) 78(L) 83(L) - 90(L) Lymphocytes 12 - 49 % 8(L) 11(L) 19 10(L) - - - Monocytes 5 - 13 % 9 5 12 6 - - - Eosinophils 0 - 7 % 0 2 4 2 - - - Basophils 0 - 1 % 1 0 1 0 - - - Bands 0 - 6 % 0 2 2 3 - - - Blasts 0 % 0 0 0 0 - - - Albumin 3.5 - 5.0 g/dL 2. 2(L) 2. 1(L) 2. 1(L) 2. 2(L) 2. 0(L) - 2. 1(L) Calcium 8.5 - 10.1 MG/DL 8.9 8.6 8.4(L) 8.4(L) 8. 1(L) - 7. 9(L) SGOT 15 - 37 U/L 21 29 31 38(H) 57(H) - 64(H) Glucose 65 - 100 mg/dL 93 105(H) 92 103(H) 114(H) - 91 BUN 6 - 20 MG/DL 25(H) 25(H) 25(H) 23(H) 23(H) - 24(H) Creatinine 0.70 - 1.30 MG/DL 1.05 1.00 0.99 0.97 1.02 - 1.04 Sodium 136 - 145 mmol/L 137 134(L) 133(L) 131(L) 132(L) - 131(L) Potassium 3.5 - 5.1 mmol/L 4.4 3.9 4.1 3.8 3. 2(L) - 3. 2(L) TSH 0.36 - 3.74 uIU/mL - - - - - - -  
LDH 85 - 241 U/L 245(H) 274(H) 251(H) 252(H) 253(H) 224 236 Some recent data might be hidden Lab Results Component Value Date/Time TSH 2.30 12/03/2019 03:29 AM  
 TSH 2.40 10/25/2019 07:39 PM  
 TSH 2.45 06/01/2019 04:16 AM  
 
 
ALLERGY: 
No Known Allergies CURRENT MEDICATIONS: 
Current Facility-Administered Medications Medication Dose Route Frequency  warfarin (COUMADIN) tablet 2 mg  2 mg Oral ONCE  
 acetylcysteine (MUCOMYST) 200 mg/mL (20 %) solution 1,200 mg  1,200 mg Nebulization TID  clonazePAM (KlonoPIN) tablet 0.5 mg  0.5 mg Oral QHS PRN  
 levETIRAcetam (KEPPRA) tablet 250 mg  250 mg Oral BID  potassium chloride SR (KLOR-CON 10) tablet 20 mEq  20 mEq Oral BID  senna-docusate (PERICOLACE) 8.6-50 mg per tablet 1 Tab  1 Tab Oral PRN  
 collagenase (SANTYL) 250 unit/gram ointment   Topical DAILY  fludrocortisone (FLORINEF) tablet 0.1 mg  0.1 mg Oral DAILY  cholecalciferol (VITAMIN D3) (1000 Units /25 mcg) tablet 2 Tab  2,000 Units Oral DAILY  venlafaxine-SR (EFFEXOR-XR) capsule 150 mg  150 mg Oral DAILY WITH BREAKFAST  hydrocortisone (CORTAID) 1 % cream   Topical TID PRN  thiamine HCL (B-1) tablet 100 mg  100 mg Oral DAILY  cefepime (MAXIPIME) 2 g in 0.9% sodium chloride (MBP/ADV) 100 mL  2 g IntraVENous Q8H  
 oxybutynin (DITROPAN) tablet 5 mg  5 mg Oral Q6H PRN  
 magnesium oxide (MAG-OX) tablet 800 mg  800 mg Oral BID  lidocaine (URO-JET/ GLYDO) 2 % jelly   Urethral PRN  
 epoetin yvonne-epbx (RETACRIT) injection 20,000 Units  20,000 Units SubCUTAneous Q MON, WED & FRI  albuterol-ipratropium (DUO-NEB) 2.5 MG-0.5 MG/3 ML  3 mL Nebulization Q6H PRN  therapeutic multivitamin (THERAGRAN) tablet 1 Tab  1 Tab Oral DAILY  alteplase (CATHFLO) 1 mg in sterile water (preservative free) 1 mL injection  1 mg InterCATHeter PRN  finasteride (PROSCAR) tablet 5 mg  5 mg Oral DAILY  diphenhydrAMINE (BENADRYL) capsule 25 mg  25 mg Oral QHS PRN  
 octreotide (SANDOSTATIN) injection 25 mcg  25 mcg SubCUTAneous TID  pantoprazole (PROTONIX) tablet 40 mg  40 mg Oral ACB  arformoterol (BROVANA) neb solution 15 mcg  15 mcg Nebulization BID RT And  
 budesonide (PULMICORT) 500 mcg/2 ml nebulizer suspension  500 mcg Nebulization BID RT  
 lactobac ac& pc-s.therm-b.anim (TIAN Q/RISAQUAD)  1 Cap Oral DAILY  oxyCODONE IR (ROXICODONE) tablet 5 mg  5 mg Oral Q6H PRN  
 tamsulosin (FLOMAX) capsule 0.4 mg  0.4 mg Oral DAILY  Warfarin MD/NP dosing   Other PRN  
  sodium chloride (NS) flush 5-40 mL  5-40 mL IntraVENous Q8H  
 sodium chloride (NS) flush 5-40 mL  5-40 mL IntraVENous PRN  
 acetaminophen (TYLENOL) tablet 650 mg  650 mg Oral Q6H PRN  
 naloxone (NARCAN) injection 0.4 mg  0.4 mg IntraVENous PRN  
 ondansetron (ZOFRAN) injection 4 mg  4 mg IntraVENous Q4H PRN  
 sucralfate (CARAFATE) tablet 1 g  1 g Oral AC&HS  influenza vaccine 2019-20 (6 mos+)(PF) (FLUARIX/FLULAVAL/FLUZONE QUAD) injection 0.5 mL  0.5 mL IntraMUSCular PRIOR TO DISCHARGE  hydrALAZINE (APRESOLINE) 20 mg/mL injection 20 mg  20 mg IntraVENous Q6H PRN Thank you for allowing me to participate in this patient's care. TIERRA Rivas [address] [place] [address], [address] Phone: [phone] Fax: [phone] [facility] ATTENDING ADDENDUM Patient was seen and examined in person. Data and notes were reviewed. I have discussed and agree with the plan as noted in the NP note above without further additions. Brandy Rizvi MD PhD 
Calos Rueda [address] [place] [address]

## 2020-01-20 NOTE — PROGRESS NOTES
Problem: Mobility Impaired (Adult and Pediatric) Goal: *Acute Goals and Plan of Care (Insert Text) Description FUNCTIONAL STATUS PRIOR TO ADMISSION: Patient was modified independent using a single point cane for functional mobility. Patient reports an increasingly sedentary lifestyle 2* fatigue and SOB/dyspnea. Retired (so is his wife). Patient reports x 3 falls within the last couple of weeks. Patient is wearing either nasal cannula or CPAP at night. LVAD work-up has been initiated. HOME SUPPORT PRIOR TO ADMISSION: The patient lived with his wife, but did not require physical assistance. Physical Therapy Goals: 
 
Reassessed 1/17/2020 and goals upgraded as approporiate Goals upgraded as appropriate 1/07/2020- Goals appropriate 1/9/2020 on weekly reassessment 1. Patient will move from supine to sit and sit to supine, scoot up and down and roll side to side in bed with supervision within 7 days. 2.  Patient will perform sit to/from stand with minimal assistance x 1 within 7 days. - Upgrade to contact guard assist 1/17/2020 3. Patient will ambulate 50 feet with least restrictive assistive device and moderate assistance within 7 days. 4.  Stair goal to be formulated when appropriate. 5.  Patient will perform cardiac exercises per protocol with supervision within 7 days. 6.  Patient will verbally and functionally recall mindful movement principles (Move in the Tube) with minimal verbal cuing/reminding within 7 days. 7.  Patient will perform power exchange for power module to/from battery with minimal assistance within 7 days. - Upgrade to verbal cuing only 1/17/2020 Reassessed on 1/2/2020 and goals not met, carry-over. Reassessed on 12/26/2019, goals not met but remain appropriate, so carry over Weekly reassessment completed 12/19/2019 and goals downgraded as appropriate.   
1.  Patient will move from supine to sit and sit to supine, scoot up and down and roll side to side in bed with contact guard assistance within 7 days. 2.  Patient will perform sit to/from stand with minimal assistance x 1 within 7 days. 3.  Patient will ambulate 25 feet with least restrictive assistive device and moderate assistance within 7 days. 4.  Stair goal to be formulated when appropriate. 5.  Patient will perform cardiac exercises per protocol with supervision within 7 days. 6.  Patient will verbally and functionally recall mindful movement principles (Move in the Tube) with minimal verbal cuing/reminding within 7 days. 7.  Patient will perform power exchange for power module to/from battery with moderate assistance within 7 days. Re-evaluation completed 11/20/2019 and new goals formulated following LVAD implantation. Reviewed and cont 12/3/2019. Reviewed and continued 12/12/2019 1. Patient will move from supine to sit and sit to supine, scoot up and down and roll side to side in bed with minimal assistance/contact guard assist within 7 days. 2.  Patient will perform sit to/from stand with minimal assistance/contact guard assist within 7 days. 3.  Patient will ambulate 150 feet with least restrictive assistive device and minimal assistance/contact guard assist within 7 days. 4.  Patient will ascend/descend 4 stairs with handrail(s) with minimal assistance/contact guard assist within 7 days. 5.  Patient will perform cardiac exercises per protocol with supervision within 7 days. 6.  Patient will verbally and functionally recall 3/3 sternal precautions within 7 days. 7.  Patient will perform power exchange for power module to/from battery with moderate assistance  within 7 days. Initiated 10/27/2019; updated 11/15/2019 1. Patient will move from supine to sit and sit to supine, scoot up and down and roll side to side in bed with independence within 7 days. 2.  Patient will perform sit to/from stand with supervision/set-up within 7 days. 3.  Patient will ambulate 200 feet with least restrictive assistive device and supervision/set-up within 7 days. 4.  Patient will ascend/descend 4 stairs with  handrail(s) with supervision/set-up within 7 days for functional strengthening and community reintegration. Hernando Benitez 5.  Patient will verbally and functionally recall 3/3 sternal precautions within 7 days in preparation for LVAD implantation. 6.  Patient will perform a mock power exchange for power module to/from battery with supervision/set-up within 7 days in preparation for LVAD implantation. 1.  Patient will move from supine to sit and sit to supine, scoot up and down and roll side to side in bed with independence within 7 days. 2.  Patient will perform sit to/from stand with supervision/set-up within 7 days. 3.  Patient will ambulate 150 feet with least restrictive assistive device and supervision/set-up within 7 days. 4.  Patient will ascend/descend 4 stairs with  handrail(s) with supervision/set-up within 7 days for functional strengthening and community reintegration. Hernando Benitez 5.  Patient will verbally and functionally recall 3/3 sternal precautions within 7 days in preparation for LVAD implantation. 6.  Patient will perform a mock power exchange for power module to/from battery with supervision/set-up within 7 days in preparation for LVAD implantation. Outcome: Not Progressing Towards Goal 
  
PHYSICAL THERAPY TREATMENT Patient: Prince Carias (75 y.o. male) Date: 1/20/2020 Diagnosis: [facility] [I50.9] <principal problem not specified> Procedure(s) (LRB): LEFT BRACHIAL THROMBECTOMY (Left) 56 Days Post-Op Precautions: Fall, LVAD Chart, physical therapy assessment, plan of care and goals were reviewed. ASSESSMENT Patient continues with skilled PT services and is slowly progressing towards goals.  This date, patient limited by orthostatic hypotension: 
Supine: 92 MAP 
 Sittin MAP, + 3 minutes 78 MAP, + 15 minutes 60 MAP (symptomatic: diaphoretic, facial pallor, decreased alertness, nausea, SOB) Patient participated in seated functional reaching tasks + LVAD power switch-overs in order to progress patient's acclimation to vertical positioning and to progress LVAD terminology recall, order of steps for switching over from module to batteries, and to progress trunk rotation bilaterally to improve environmental scanning 2* impaired vision. Patient able to laterally scoot to the right x 5 times while seated (minimal assist x 1-2 pending fatigue onset). Continue to recommend discharge to rehab once medically stable. Patient is highly motivated to regain his functional independence. 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 Current Level of Function Impacting Discharge (mobility/balance): Unable to stand this date 2* hypotension in prolonged sitting. Other factors to consider for discharge: Prolonged hospital stay PLAN : 
Patient continues to benefit from skilled intervention to address the above impairments. Continue treatment per established plan of care. to address goals. Recommendation for discharge: (in order for the patient to meet his/her long term goals) To be determined: Rehab This discharge recommendation: 
Has been made in collaboration with the attending provider and/or case management IF patient discharges home will need the following DME: to be determined (TBD) SUBJECTIVE:  
Patient stated I'm not feeling too good today.  OBJECTIVE DATA SUMMARY:  
Critical Behavior: 
Neurologic State: Alert Orientation Level: Oriented X4 Cognition: Appropriate decision making, Appropriate for age attention/concentration, Appropriate safety awareness, Follows commands Safety/Judgement: Decreased insight into deficits, Decreased awareness of need for safety, Decreased awareness of need for assistance Functional Mobility Training: 
Bed Mobility: 
Supine to Sit: Minimum assistance;Assist x1;Additional time Sit to Supine: Minimum assistance;Assist x2; Additional time; Moderate assistance Scooting: Minimum assistance;Assist x1;Additional time Balance: 
Sitting: Impaired; Without support Sitting - Static: Fair (occasional) Sitting - Dynamic: Fair (occasional) Therapeutic Exercises:  
Seated: 
Trunk rotations UE cardiac exercises Reaching outside his CHHAYA Pain Rating: 
+ SOB/DOUGLAS Activity Tolerance:  
Fair, desaturates with exertion and requires oxygen, and requires frequent rest breaks Please refer to the flowsheet for vital signs taken during this treatment. After treatment patient left in no apparent distress:  
Supine in bed, Call bell within reach, Caregiver / family present, and Side rails x 3 
 
COMMUNICATION/COLLABORATION:  
The patients plan of care was discussed with: Occupational Therapist, Registered Nurse, Physician, and  Washington Coats, PT, DPT Time Calculation: 41 mins

## 2020-01-20 NOTE — PROGRESS NOTES
A Spiritual Care Partner Volunteer visited patient in  466 on 01/20/2020. Documented by: 
Chaplain Caceres MDiv, MS, [address] [facility] 
[facility] ([id number])

## 2020-01-20 NOTE — PROGRESS NOTES
ID Progress Note 2020 Subjective: He is sitting up in a chair today. He offers no complaints at the current time. Objective: Antibiotics: 1. Levaquin 2. Cefepime 3. Rifampin 4. Fluconazole 5. Vancomycin 6. Cefazolin 7. Zosyn Vitals:  
Visit Vitals BP 91/72 (BP 1 Location: Left arm, BP Patient Position: At rest) Pulse 92 Temp 97.5 °F (36.4 °C) Resp 18 Ht 6' 2\" (1.88 m) Wt 79.4 kg (175 lb 0.7 oz) SpO2 100% BMI 22.47 kg/m² Tmax:  Temp (24hrs), Av.8 °F (36.6 °C), Min:96.6 °F (35.9 °C), Max:98.7 °F (37.1 °C) Exam:  Lungs: A few basilar rales Heart:  regular rate and rhythm Abdomen:  soft, non-tender. Bowel sounds normal. No masses,  no organomegaly Urine in neal is grossly clear Sternal wound is dressed and dry Labs:     
Recent Labs  
  20 
6499 20 
0428 20 
9554 WBC 3.6* 3.3* 2.6* HGB 10.1* 9.8* 9.2*  
PLT 89* 69* 68*  
BUN 25* 25* 25* CREA 1.05 1.00 0.99  
SGOT 21 29 31 * 123* 117 TBILI 0.5 0.5 0.4 Cultures: No results found for: [facility] [place], operated [facility] Lab Results Component Value Date/Time Culture result: LIGHT YEAST (A) 2020 10:01 AM  
 Culture result: LIGHT NORMAL RESPIRATORY TIAN 2020 10:01 AM  
 Culture result: NO GROWTH 5 DAYS 2020 09:52 AM  
 
 
Radiology:  
 
Line/Insert Date:        
 
Assessment:  
 
1. UTI--Serratia (2 biotypes) from culture and small amount of Enterococcus--now with Pseudomonas from culture of urine and blood 2. CHF/cardiomyopathy 3. ?aspiration event(s) 4. Renal insufficiency 5. Respiratory difficulties Objective: 1. Adjust antibiotic therapy 2. Presence of LVAD in face of bacteremia may require longer course of therapy, or at least PO Rx for a time 3. Work-up any fever 4. Follow-up pending cultures and studies Abner Mahan MD

## 2020-01-20 NOTE — PROGRESS NOTES
[facility] [place] [facility] 
[address], [address] W: [phone]  F: [phone] Reason for Consult:  
Milagro Holley is a 71 y.o. male who is seen in consultation at the request of Mlyes Chandra NP for evaluation of pancytopenia Treatment History: · EGD 11/11/19 normal 
· Colonoscopy 11/11/19 with moderate diverticulosis in the descending and sigmoid colon but was otherwise normal 
· Epoetin 20,000 units MWF History of Present Illness: The patient is a very pleasant [de-identified] y.o. male who was first seen by Dr. Jass Michel at the end of October for thrombocytopenia, iron deficiency anemia, and mediastinal lymphadenopathy. The plan was for a PET scan after discharge as well as a colonoscopy once patient is well enough and IV iron. Patient had an EGD and colonoscopy 11/11/19. EGD was normal. Colonoscopy showed moderate diverticulosis in the descending and sigmoid colon but was otherwise normal. Patient previously had gross hematuria which was believed to be the cause of his blood loss anemia. Patient remains in the hospital and now has neutropenia, so hematology was re-consulted. SinceI  last saw patient, patient completed his LVAD evaluation and had an implant placed on 11/18/19. He is hemodynamically stable and remains in the hospital for PT/OT. Patient reports he is doing fair. He remains weak. He has been working with PT/OT but activity is limited due to orthostatic hypotension. He denies CP or SOB. He denies hemoptysis, hematemesis, melena, hematuria or epistaxis. 1/16/2020 The patient's blood work looks good. There does not appear to be a problem with iron B12 or folate. Copper level is still pending. Patient is on a number of drugs that could affect the white count. It would be important to eliminate as many of those that we can safely. 1/17/2020 Patient sitting up in bed eating breakfast. Patient labs stable today. Copper level still pending 2020 Patient seems to be doing fairly well his white count has come up. Would cut back on the frequency of the CBCs. And we will sign back on the case if any issues or problems come up. Past Medical History:  
Diagnosis Date  Degenerative disc disease, lumbar  Heart failure ([facility]  High cholesterol  Hypertension  Paroxysmal atrial fibrillation ([facility] 2019  Spinal stenosis Past Surgical History:  
Procedure Laterality Date  COLONOSCOPY Left 2019 COLONOSCOPY at bedside performed by Parris Burch MD at [place] [address]  HX CORONARY ARTERY BYPASS GRAFT    
 triple  HX HERNIA REPAIR    
 HX IMPLANTABLE CARDIOVERTER DEFIBRILLATOR  UT CARDIOVERSION ELECTIVE ARRHYTHMIA EXTERNAL N/A 6/10/2019 EP CARDIOVERSION performed by Dottie Stoll MD at [place] [address], [facility] [address] CATH LAB  UT CARDIOVERSION ELECTIVE ARRHYTHMIA EXTERNAL N/A 2019 EP CARDIOVERSION performed by Georgeanna Cooks, MD at [place] [address], [facility] [address] CATH LAB  UT INSJ/RPLCMT PERM DFB W/TRNSVNS LDS 1/DUAL CHMBR N/A 2019 INSERT ICD BIV MULTI performed by Carl Chandler MD at [address] [facility], [facility] [address] CATH LAB  UT TCAT IMPL WRLS P-ART PRS SNR L-T HEMODYN MNTR N/A 2019 IMPLANT HEART FAILURE MONITORING DEVICE performed by Clara Jackson MD at [address] [facility], [facility] [address] CATH LAB Social History Tobacco Use  Smoking status: Former Smoker Last attempt to quit: 2010 Years since quittin.1  Smokeless tobacco: Never Used Substance Use Topics  Alcohol use: Not Currently Comment: rarely Family History Problem Relation Age of Onset  Lung Disease Mother  Hypertension Mother Dulcynea.Radha Arthritis-osteo Mother  Heart Disease Mother  Heart Disease Father  Diabetes Father Current Facility-Administered Medications Medication Dose Route Frequency  acetylcysteine (MUCOMYST) 200 mg/mL (20 %) solution 1,200 mg  1,200 mg Nebulization TID  clonazePAM (KlonoPIN) tablet 0.5 mg  0.5 mg Oral QHS PRN  
 levETIRAcetam (KEPPRA) tablet 250 mg  250 mg Oral BID  potassium chloride SR (KLOR-CON 10) tablet 20 mEq  20 mEq Oral BID  senna-docusate (PERICOLACE) 8.6-50 mg per tablet 1 Tab  1 Tab Oral PRN  
 collagenase (SANTYL) 250 unit/gram ointment   Topical DAILY  fludrocortisone (FLORINEF) tablet 0.1 mg  0.1 mg Oral DAILY  cholecalciferol (VITAMIN D3) (1000 Units /25 mcg) tablet 2 Tab  2,000 Units Oral DAILY  venlafaxine-SR (EFFEXOR-XR) capsule 150 mg  150 mg Oral DAILY WITH BREAKFAST  hydrocortisone (CORTAID) 1 % cream   Topical TID PRN  thiamine HCL (B-1) tablet 100 mg  100 mg Oral DAILY  cefepime (MAXIPIME) 2 g in 0.9% sodium chloride (MBP/ADV) 100 mL  2 g IntraVENous Q8H  
 oxybutynin (DITROPAN) tablet 5 mg  5 mg Oral Q6H PRN  
 magnesium oxide (MAG-OX) tablet 800 mg  800 mg Oral BID  lidocaine (URO-JET/ GLYDO) 2 % jelly   Urethral PRN  
 epoetin yvonne-epbx (RETACRIT) injection 20,000 Units  20,000 Units SubCUTAneous Q MON, WED & FRI  albuterol-ipratropium (DUO-NEB) 2.5 MG-0.5 MG/3 ML  3 mL Nebulization Q6H PRN  therapeutic multivitamin (THERAGRAN) tablet 1 Tab  1 Tab Oral DAILY  alteplase (CATHFLO) 1 mg in sterile water (preservative free) 1 mL injection  1 mg InterCATHeter PRN  finasteride (PROSCAR) tablet 5 mg  5 mg Oral DAILY  diphenhydrAMINE (BENADRYL) capsule 25 mg  25 mg Oral QHS PRN  
 octreotide (SANDOSTATIN) injection 25 mcg  25 mcg SubCUTAneous TID  pantoprazole (PROTONIX) tablet 40 mg  40 mg Oral ACB  arformoterol (BROVANA) neb solution 15 mcg  15 mcg Nebulization BID RT And  
 budesonide (PULMICORT) 500 mcg/2 ml nebulizer suspension  500 mcg Nebulization BID RT  
 lactobac ac& pc-s.therm-b.anim (TIAN Q/RISAQUAD)  1 Cap Oral DAILY  oxyCODONE IR (ROXICODONE) tablet 5 mg  5 mg Oral Q6H PRN  
  tamsulosin (FLOMAX) capsule 0.4 mg  0.4 mg Oral DAILY  Warfarin MD/NP dosing   Other PRN  
 sodium chloride (NS) flush 5-40 mL  5-40 mL IntraVENous Q8H  
 sodium chloride (NS) flush 5-40 mL  5-40 mL IntraVENous PRN  
 acetaminophen (TYLENOL) tablet 650 mg  650 mg Oral Q6H PRN  
 naloxone (NARCAN) injection 0.4 mg  0.4 mg IntraVENous PRN  
 ondansetron (ZOFRAN) injection 4 mg  4 mg IntraVENous Q4H PRN  
 sucralfate (CARAFATE) tablet 1 g  1 g Oral AC&HS  influenza vaccine 2019-20 (6 mos+)(PF) (FLUARIX/FLULAVAL/FLUZONE QUAD) injection 0.5 mL  0.5 mL IntraMUSCular PRIOR TO DISCHARGE  hydrALAZINE (APRESOLINE) 20 mg/mL injection 20 mg  20 mg IntraVENous Q6H PRN No Known Allergies Review of Systems: A complete review of systems was obtained, negative except as described above. Physical Exam:  
 
Visit Vitals BP 91/72 (BP 1 Location: Left arm, BP Patient Position: At rest) Pulse 84 Temp 97.5 °F (36.4 °C) Resp 18 Ht 6' 2\" (1.88 m) Wt 175 lb 0.7 oz (79.4 kg) SpO2 97% BMI 22.47 kg/m² ECOG PS: 2 General: No distress Eyes: PERRL, anicteric sclerae HENT: Atraumatic, OP clear Neck: Supple Respiratory: normal respiratory effort, on O2 
MS: In bed, moves all extremities Skin: Warm and dry Psych: Alert, oriented, appropriate affect, normal judgment/insight Results:  
 
Lab Results Component Value Date/Time WBC 3.6 (L) 01/20/2020 06:11 AM  
 HGB 10.1 (L) 01/20/2020 06:11 AM  
 HCT 32.7 (L) 01/20/2020 06:11 AM  
 PLATELET 89 (L) 28/21/2745 06:11 AM  
 MCV 95.6 01/20/2020 06:11 AM  
 ABS. NEUTROPHILS 3.0 01/20/2020 06:11 AM  
 
Lab Results Component Value Date/Time  Sodium 137 01/20/2020 06:11 AM  
 Potassium 4.4 01/20/2020 06:11 AM  
 Chloride 106 01/20/2020 06:11 AM  
 CO2 26 01/20/2020 06:11 AM  
 Glucose 93 01/20/2020 06:11 AM  
 BUN 25 (H) 01/20/2020 06:11 AM  
 Creatinine 1.05 01/20/2020 06:11 AM  
 GFR est AA >60 01/20/2020 06:11 AM  
 GFR est non-AA >60 01/20/2020 06:11 AM  
 Calcium 8.9 01/20/2020 06:11 AM  
 Glucose (POC) 137 (H) 01/07/2020 11:16 AM  
 
Lab Results Component Value Date/Time Bilirubin, total 0.5 01/20/2020 06:11 AM  
 ALT (SGPT) 25 01/20/2020 06:11 AM  
 AST (SGOT) 21 01/20/2020 06:11 AM  
 Alk. phosphatase 125 (H) 01/20/2020 06:11 AM  
 Protein, total 6.4 01/20/2020 06:11 AM  
 Albumin 2.2 (L) 01/20/2020 06:11 AM  
 Globulin 4.2 (H) 01/20/2020 06:11 AM  
 
CT C/A/P 10/25/19 IMPRESSION:  
1. Low-grade nonspecific mediastinal adenopathy. 2. Trace right pleural effusion. 3. Emphysema. 4. Left renal atrophy. 5. Colonic diverticula. 6. Moderate bladder distention with diverticula CTA CHEST 1/14/20 IMPRESSION:  
1. No occlusion or stenosis of the LVAD outflow tubing. 2. Nonocclusive radiodense foreign body in the posterior basilar left lower 
lobar artery. 3. Decreased pulmonary edema. Bilateral lower lobe atelectasis more likely than 
pneumonia. 4. Unchanged lymphadenopathy. Other incidental findings are described above. 1/15/20 Peripheral blood, smear:  
Pancytopenia without dysmyelopoiesis, dyserythropoiesis or platelet aggregation. Records reviewed and summarized above. Radiology report(s) reviewed above. Assessment:  
1) Neutropenia Peripheral blood smear demonstrates pancytopenia without dysmyelopoiesis, dyserythropoiesis or platelet aggregation. Patient does not have any signs or symptoms of infection Vitamin B12, folate, and copper levels within normal limits May be secondary to nutritional deficiency; patient improving PO intake The following drugs have been associated with neutropenia Allopurinol, Maxipime, Clonopin, Levaquin, Apresoline,and Revatio. So the value of these drugs needs to be considered on an individual basis for this patient. --Cardiology stopped allopurinol, revatio, and klonopin. Still receiving Maxipine and Levaquin. --It appears WBC are trending up with these changes. WBC today is 3.6 with ANC of 3.0. Will continue to monitor for improvement. 2) Anemia with iron deficiency Hgb trending up; Today Hgb is 10.1 g/dL EGD and colonoscopy 11/11/19 did not show any obvious cause of bleeding It was believed that patient's iron deficiency anemia was secondary to gross hematuria. The iron studies were normal as was B12 and folate. Patient is receiving Epoetin MWF 
 
3) Thrombocytopenia Stable; currently 89 HIT panel checked when thrombocytopenia first started 10/31/19 and was negative May be secondary to nutritional deficiency; PO intake improving 4) Mediastinal lymphadenopathy Unchanged on CTA Chest 1/14/20 Concerning for bronchogenic carcinoma. Patient will need a PET scan after discharge 5) CHF S/p LVAD 11/18/19 Patient on Coumadin for anticoagulation for LVAD with a INR goal 1.5-2 Management per cardiology and cardiac surgery 6) CAD s/p CABG Management per cardiology and cardiac surgery 7) CKD 3, atrophic left kidney Likely contributing to anemia Patient is on Epoetin MWF 
 
8) COPD Management per primary team 
 
9) Orthostatic hypotension Patient on midodrine and Florinef Management per cardiology 10) Malnutrition Likely contributing to pancytopenia RD is following for nutritional needs Plan: · Reduce CBC frequency to twice weekly · Recommend PRBC transfusion for Hgb < 7.0 g/dL · Recommend PLT transfusion for PLT < 20 K or active bleeding · PET scan once patient is discharged · Continue to evaluate the potential benefits of the drugs that have been associated with neutropenia. · We will go ahead and sign off the case and we will [place] back around if you have any new issues that come up. Will follow Call if questions I appreciate the opportunity to participate in Mr. Sona Kiser's care.

## 2020-01-20 NOTE — PROGRESS NOTES
0800: PRECEPTOR REVIEW OF RN ORIENTEE'S WORK: 
 
1/20/2020    Shift times- 0800 to 2000 The RN donee's documentation of patient care for Verona Cyr has been reviewed and approved. All medications have been administered under the direct supervision of the preceptor. Arabella Hillman, RN 
9057: s/w nuclear med dept, amyloid study will be completed tomorrow 1/21/20 d/t scheduling availability. 1503: s/w PET scan dept Inez Anderson, she will place orders in progress note and study will be completed next Monday. 1930:Bedside and Verbal shift change report given to Cisco Bee rn (oncoming nurse) by kelly ferrara rn and zachery hdez rn (offgoing nurse). Report included the following information SBAR, Kardex, OR Summary, Procedure Summary, Intake/Output, MAR and Recent Results.

## 2020-01-20 NOTE — PROGRESS NOTES
MERLINE Ramírez is a 71 y.o. male status post LVAD for CHF who appears to be having choreiform movements/dystonic appearing tremors involving the head, neck and all extremities. This is gotten worse over the past several days. These are suspected to be myoclonic tremors/choreiform dystonic movements possibly due to and currently decompensated due to due to vascular/ ischemic changes in deep brain structures/basal ganglia metabolic issues and being on multiple medications EXAM 
Exam: 
Visit Vitals BP 91/72 (BP 1 Location: Left arm, BP Patient Position: At rest) Pulse 84 Temp 97.5 °F (36.4 °C) Resp 18 Ht 6' 2\" (1.88 m) Wt 79.4 kg (175 lb 0.7 oz) SpO2 97% BMI 22.47 kg/m² Gen: 
CV: RRR Lungs: non labored breathing Abd: non distending Neuro: A&O x 3, no dysarthria or aphasia CN II-XII: PERRL, EOMI, face symmetric, tongue/palate midline Motor: strength 5/5 all four ext. Myoclonic type/dystonic, slow tremor seen in head neck and upper extremities Sensory: intact to LT Gait: normal 
 
LABS/ IMAGING Lab Results Component Value Date/Time WBC 3.6 (L) 01/20/2020 06:11 AM  
 HGB 10.1 (L) 01/20/2020 06:11 AM  
 HCT 32.7 (L) 01/20/2020 06:11 AM  
 PLATELET 89 (L) 36/10/5853 06:11 AM  
 MCV 95.6 01/20/2020 06:11 AM  
 
Lab Results Component Value Date/Time Sodium 137 01/20/2020 06:11 AM  
 Potassium 4.4 01/20/2020 06:11 AM  
 Chloride 106 01/20/2020 06:11 AM  
 CO2 26 01/20/2020 06:11 AM  
 Anion gap 5 01/20/2020 06:11 AM  
 Glucose 93 01/20/2020 06:11 AM  
 BUN 25 (H) 01/20/2020 06:11 AM  
 Creatinine 1.05 01/20/2020 06:11 AM  
 BUN/Creatinine ratio 24 (H) 01/20/2020 06:11 AM  
 GFR est AA >60 01/20/2020 06:11 AM  
 GFR est non-AA >60 01/20/2020 06:11 AM  
 Calcium 8.9 01/20/2020 06:11 AM  
 Bilirubin, total 0.5 01/20/2020 06:11 AM  
 AST (SGOT) 21 01/20/2020 06:11 AM  
 Alk.  phosphatase 125 (H) 01/20/2020 06:11 AM  
 Protein, total 6.4 01/20/2020 06:11 AM  
 Albumin 2.2 (L) 01/20/2020 06:11 AM  
 Globulin 4.2 (H) 01/20/2020 06:11 AM  
 A-G Ratio 0.5 (L) 01/20/2020 06:11 AM  
 ALT (SGPT) 25 01/20/2020 06:11 AM  
 
 
 
ASSESSMENT Hospital Problems  Date Reviewed: 9/3/2019 Codes Class Noted POA Tremor ICD-10-CM: R25.1 ICD-9-CM: 781.0  1/20/2020 Unknown Acute decompensated heart failure (HCC) ICD-10-CM: I50.9 ICD-9-CM: 428.0  10/25/2019 Unknown PLAN Keppra 250 mg twice daily has not made much difference so far Recommend continuing same dose for now while his other medications and antibiotics are being minimized He is currently on clonazepam as well If not, we may consider trying olanzapine, gabapentin or Milvia Jane MD

## 2020-01-21 NOTE — PROGRESS NOTES
[address] [facility] in [place], [place] Inpatient Progress Note Patient name: Nickie Reddy Patient : 1950 Patient MRN: [id number] Attending MD: Vika Ibarra MD 
Date of service: 20 Chief Complaint:  
Rolando Pettit azucena LVAD implant 
  
HPI: Sona Kiser is a 71y.o. year old pleasant white male with a history of HTN, HLD, JOSE, CAD s/p cardiac arrest VFib s/p CABG () c/b sternal would infection and sternectomy, ischemic cardiomyopathy LVEF 15-20%, s/p ICD and with LBBB. He was admitted with acute on chronic systolic heart failure with massive volume overload > 20 lbs, in the setting of atrial fibrillation s/p failed DCCV and underwent DCCV and RHC on .  S/p BiVICD on 19 with Dr. Rosemarie Rees. He was discharged home home on IV milrinone on 19. Mortimer Fabry has been followed closely by Dr. Sandra Ring and the [facility]. 
  
Mr. Kiser was admitted for acute on chronic systolic heart failure. He underwent implantation of Impella 5.0 due to CS and has completed his LVAD evaluation. Mortimer Fabry meets criteria for implant of HM3 as DT. He was NYHA Class IV prior to implant of Impella 5.0, has LVEF < 15%, was intolerant of GDMT due to symptomatic hypotension and renal dysfunction. He remains dependent on temporary MCS support. RHC  revealed compensated hemodynamics on Impella. His renal function has improved dramatically with improvement in his hemodynamics. He is not a suitable transplant candidate due to single kidney, sternectomy, debility, and frailty. He was reviewed by Kole Bailey and felt to be a high risk heart transplant candidate due to multiple co-morbidities as well as the afore mentioned conditions.  He remained in the CCU and underwent a HM 3 implant as DT on . He was weaned off of pressors and transferred to [place] [address] where he continues to undergo PT/OT and hemodynamic optimization.   
  
24Hr Events:  
No acute overnight events Remains dizzy, weak Poor appetite RHC this morning Procedure:  Procedure(s): REMOVAL TEMPORARY LEFT VENTRICULAR ASSIST DEVICE, IMPLANTATION OF LEFT VENTRICULAR ASSIST DEVICE PERMANENT (VAD), ECC, JACQUE, EPI AORTIC US BY DR Aylin Motley.    
POD:  64 
  
Impression / Plan:  
Heart Failure Status: NYHA Class IV, improved to NYHA Class III Chronic systolic heart failure due to ICM, NYHA Class IV, EF < 15%, cardiogenic shock bridged with Impella 5.0 support to HM 3 implant S/p Impella removal and LVAD implant 11/18/19 RHC this morning, CI 3, filling pressures low Decrease set speed to 6000rpm due to hypovolemia Albumin this am 
Repeat TTE this afternoon with current speed of 6000RPM 
Multiple PI events on interrogation Discontinued Sildenafil due to leukopenia Intolerant of BB due to RV failure Intolerant of ACEi/ARB/ARNI/AA due to JUAN J on CKD 3 Intolerant of spironolactone d/t IVVD Discontinue midodrine, MAPs >80 No diuretics or fluid restriction Strict I/O, daily weights Continue LVAD education Dressing change frequency three times weekly, sutures removed 12/26/19 Chest CTA- no outflow graft occlusion Pet Scan ordered- pending PYP testing today Send [facility] today Bacteremia - Pseudomonas Blood cultures (12/31/19) 2/4 bottles +Pseudomonas & 2/4 bottles GNRs Repeat BC NGTD Stop cefepime- due to myoclonus Cont meropenem Follow procalcitonin and lactic acid levels - both improving Repeat sputum- few yeast, normal soren Repeat UA negative Orthostatic Hypotension D/C midodrine- MAPs 's Cont Florinef 0.1 mg daily Stim test unremarkable Remains orthostatic Leukopenia Peripheral smear-(1/15/2020) Pancytopenia without dysmyelopoiesis, dyserythropoiesis or platelet aggregation Hematology recommendations appreciated- likely nutritional or medications Stopped allopurinol, revatio, levaquin Thrombocytopenia Peripheral smear-(1/15/2020) Pancytopenia without dysmyelopoiesis, dyserythropoiesis or platelet aggregation Plt 89 today Hematology following, appreciate assistance May be secondary to nutritional deficiency HIT negative Generalized myoclonus Increased Appreciate Neurology input Restarted Keppra 250mg BID- monitor for dizziness May consider Olanzapine or Risperdal 
Head CT negative for acute process EEG suggestive of mild generalized encephalopathic process, possibly related to underlying structural brain injury vs metabolic abnormality Monitor closely Off Digoxin  
  
Anticoagulation for LVAD, INR goal 1.5-2 Goal decreased d/t persistent hematuria No ASA d/t hematuria INR 2.1 Coumadin - 1mg tonight Acute Respiratory Failure post op Improved Wean FiO2 Pulmonary hygiene - recommend RT use flutter valve with nebs & Incentive spirometer Cont home CPAP- must use while sleeping Acute on CKD 3, atrophic left kidney Improved Appreciate Nephrology assistance- signed off Watch Cr, stable Avoid nephrotoxins, trend labs  
  
CAD s/p CABG Off ASA d/t hematuria No BB d/t RV failure Hold statin due to recent hepatic failure LHC performed 11/13 - low likelihood of benefit from revascularization  
  
Hx of VF arrest s/p BiV-ICD No recent shocks Keep K > 4 and Mg > 2  
Mag ox 800 mg po BID- watch for diarrhea Potassium 20mEq BID ICD reprogrammed to reduce diaphragmatic stimulation PAF Off Dig due to lethargy and tremors No BB due to RV failure Repeat TFTs WNL 
  
Hx of gout Uric acid WNL Acute blood loss anemia Improved Likely due to hematuria Cont octreotide 25 mcg SQ TID Fecal occult 12/14 negative, positive on 12/17 Appreciate urology recommendations 
  
Urinary retention, c/b serratia UTI and hematuria Resolved Repeat UA with cx negative 1/13/20 Cystoscopy performed 11/13 shows catheter induced cystitis Pseudomonas aeruginosa positive UA culture (12/31/19) Stopped levaquin and cefepime for tremors Severe Acute on Chronic Protein Calorie Malnutrition Eating better Prealbumin weekly - 17 on 1/13 Appreciate Nutritionist assistance Diet as tolerated, encourage food from home, protein shakes Intolerant of mirtazapine d/t tremors, confusion Cont MVI add thiamine 100mg daily Staff to assist with meals COPD Appreciate Pulmonology assistance Continue nebs PRN   
  
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  
  
Hx of sternal wound infection, sternectomy Sternum closed post op- monitor Delayed skin healing mid portion of sternotomy - evaluated by Dr. Shelley Sandoval, will continue IV abx and wet to dry dressings. Will likely require sternal wire after ~3 months from op date Vocal cord paralysis Improved ENT recs appreciated Speech therapy following - appreciate input Anxiety Decrease klonopin to 0.25mg QHS prn  
Monitor Depression Cont Effexor to 150 mg PO qAM  
Monitor rhythm strips for prolonged QTc 
  
Debility Continue PT/OT  
OOB at least x3 daily/ all meals Bladder spasms Received pyridium 100mg x4 doses Oxybutynin 5mg Q6hr prn  
  
Ppx Protonix PT/OT  
+daily BM  
PICC placed 11/22/19 Labs QOD Do Not Disturb at night from 10p-6a 
  
Dispo: 
Remain in [facility] at this time Will need IP rehab. Not ready for discharge at this time LVAD INTERROGATION: 
Device interrogated in person Device function normal, normal flow, PI events LVAD Pump Speed (RPM): 6700 Pump Flow (LPM): 6.6 MAP: 100 PI (Pulsitility Index): 2.4 Power: 6.4  Test: No 
Back Up  at Bedside & Labeled: Yes Power Module Test: No 
Driveline Site Care: No 
Driveline Dressing: Clean, Dry, and Intact Outpatient: No 
MAP in Therapeutic Range (Outpatient): No 
Testing Alarms Reviewed: Yes 
Back up SC speed: 6700 Back up Low Speed Limit: 6000 Emergency Equipment with Patient?: Yes Emergency procedures reviewed?: Yes Drive line site inspected?: Yes(covered by dressing) Drive line intergrity inspected?: Yes Drive line dressing changed?: No 
 
PHYSICAL EXAM: 
Visit Vitals BP 91/72 (BP 1 Location: Left arm, BP Patient Position: At rest) Pulse 79 Temp 97.7 °F (36.5 °C) Resp 20 Ht 6' 2\" (1.88 m) Wt 171 lb 8.3 oz (77.8 kg) SpO2 99% BMI 22.02 kg/m² Physical Exam  
Constitutional: He is oriented to person, place, and time. He appears well-developed. He appears cachectic. No distress. HENT:  
Head: Normocephalic. Neck: Normal range of motion. Neck supple. Cardiovascular: Normal rate, regular rhythm and normal heart sounds. + VAD hum Pulmonary/Chest: Effort normal. Tachypnea noted. No respiratory distress. He has rhonchi. Abdominal: Soft. Bowel sounds are normal. He exhibits no distension. Musculoskeletal: Normal range of motion. General: No edema. Neurological: He is alert and oriented to person, place, and time. He displays tremor. More lethargic today Skin: Skin is warm and dry. Psychiatric: His speech is normal and behavior is normal. His mood appears anxious. ~1 cm x 1 cm x 0.25 cm area of delayed closure on sternotomy. Site clean, no erythema or drainage noted. Subcutaneous tissue noted. Nursing note and vitals reviewed. REVIEW OF SYSTEMS: 
Review of Systems Constitutional: Positive for malaise/fatigue. Negative for chills and fever. Tired HENT: Positive for hearing loss. Negative for nosebleeds. Eyes: Negative. Respiratory: Negative for cough and shortness of breath. Cardiovascular: Negative for chest pain, palpitations, orthopnea and leg swelling. Orthostatic Gastrointestinal: Negative for heartburn, nausea and vomiting. Genitourinary: Negative for dysuria and hematuria. Musculoskeletal: Negative. Neurological: Positive for dizziness, tremors and weakness. Negative for headaches. Mild dizziness - improving Endo/Heme/Allergies: Does not bruise/bleed easily. Psychiatric/Behavioral: The patient is nervous/anxious. PAST MEDICAL HISTORY: 
Past Medical History:  
Diagnosis Date  Degenerative disc disease, lumbar  Heart failure ([facility]  High cholesterol  Hypertension  Paroxysmal atrial fibrillation ([facility] 4/2/2019  Spinal stenosis PAST SURGICAL HISTORY: 
Past Surgical History:  
Procedure Laterality Date  COLONOSCOPY Left 11/11/2019 COLONOSCOPY at bedside performed by Randall Hawthorne MD at [address]  HX CORONARY ARTERY BYPASS GRAFT    
 triple  HX HERNIA REPAIR    
 HX IMPLANTABLE CARDIOVERTER DEFIBRILLATOR  HI CARDIOVERSION ELECTIVE ARRHYTHMIA EXTERNAL N/A 6/10/2019 EP CARDIOVERSION performed by Umang Tierney MD at [address] CATH LAB  HI CARDIOVERSION ELECTIVE ARRHYTHMIA EXTERNAL N/A 6/18/2019 EP CARDIOVERSION performed by Martha Goss MD at [address] CATH LAB  HI INSJ/RPLCMT PERM DFB W/TRNSVNS LDS 1/DUAL CHMBR N/A 6/21/2019 INSERT ICD BIV MULTI performed by Ron Peralta MD at [address] CATH LAB  HI TCAT IMPL WRLS P-ART PRS SNR L-T HEMODYN MNTR N/A 9/18/2019 IMPLANT HEART FAILURE MONITORING DEVICE performed by Shabbir Shah MD at [address] CATH LAB FAMILY HISTORY: 
Family History Problem Relation Age of Onset  Lung Disease Mother  Hypertension Mother Canseco Arthritis-osteo Mother  Heart Disease Mother  Heart Disease Father  Diabetes Father SOCIAL HISTORY: 
Social History Socioeconomic History  Marital status:  Spouse name: Not on file  Number of children: Not on file  Years of education: Not on file  Highest education level: Not on file Tobacco Use  Smoking status: Former Smoker Last attempt to quit: 12/1/2010 Years since quittin.1  Smokeless tobacco: Never Used Substance and Sexual Activity  Alcohol use: Not Currently Comment: rarely  Drug use: Never Other Topics Concern LABORATORY RESULTS: 
  
Labs Latest Ref Rng & Units 2020 2020 2020 2020 2020 1/15/2020 1/15/2020 WBC 4.1 - 11.1 K/uL 3.6(L) 3. 3(L) 2. 6(L) 2. 5(L) 2. 4(L) - 2. 4(L)  
RBC 4.10 - 5.70 M/uL 3.42(L) 3.32(L) 3.11(L) 3.25(L) 3.22(L) - 3.04(L) Hemoglobin 12.1 - 17.0 g/dL 10. 1(L) 9.8(L) 9.2(L) 9.5(L) 9.5(L) - 9. 0(L) Hematocrit 36.6 - 50.3 % 32. 7(L) 32. 2(L) 29. 5(L) 30. 8(L) 30. 5(L) - 29. 2(L) MCV 80.0 - 99.0 FL 95.6 97.0 94.9 94.8 94.7 - 96.1 Platelets 109 - 132 K/uL 89(L) 69(L) 68(L) 78(L) 83(L) - 90(L) Lymphocytes 12 - 49 % 8(L) 11(L) 19 10(L) - - - Monocytes 5 - 13 % 9 5 12 6 - - - Eosinophils 0 - 7 % 0 2 4 2 - - - Basophils 0 - 1 % 1 0 1 0 - - - Bands 0 - 6 % 0 2 2 3 - - - Blasts 0 % 0 0 0 0 - - - Albumin 3.5 - 5.0 g/dL 2. 2(L) 2. 1(L) 2. 1(L) 2. 2(L) 2. 0(L) - 2. 1(L) Calcium 8.5 - 10.1 MG/DL 8.9 8.6 8.4(L) 8.4(L) 8. 1(L) - 7. 9(L) SGOT 15 - 37 U/L 21 29 31 38(H) 57(H) - 64(H) Glucose 65 - 100 mg/dL 93 105(H) 92 103(H) 114(H) - 91 BUN 6 - 20 MG/DL 25(H) 25(H) 25(H) 23(H) 23(H) - 24(H) Creatinine 0.70 - 1.30 MG/DL 1.05 1.00 0.99 0.97 1.02 - 1.04 Sodium 136 - 145 mmol/L 137 134(L) 133(L) 131(L) 132(L) - 131(L) Potassium 3.5 - 5.1 mmol/L 4.4 3.9 4.1 3.8 3. 2(L) - 3. 2(L) TSH 0.36 - 3.74 uIU/mL - - - - - - -  
LDH 85 - 241 U/L 245(H) 274(H) 251(H) 252(H) 253(H) 224 236 Some recent data might be hidden Lab Results Component Value Date/Time TSH 2.30 2019 03:29 AM  
 TSH 2.40 10/25/2019 07:39 PM  
 TSH 2.45 2019 04:16 AM  
 
 
ALLERGY: 
No Known Allergies CURRENT MEDICATIONS: 
Current Facility-Administered Medications Medication Dose Route Frequency  albumin human 5% (BUMINATE) solution 12.5 g  12.5 g IntraVENous ONCE  
  warfarin (COUMADIN) tablet 1 mg  1 mg Oral ONCE  
 sodium chloride (NS) flush 5-40 mL  5-40 mL IntraVENous PRN  
 naloxone (NARCAN) injection 0.4 mg  0.4 mg IntraVENous PRN  
 clonazePAM (KlonoPIN) tablet 0.25 mg  0.25 mg Oral QHS PRN  
 [START ON 1/22/2020] epoetin yvonne-epbx (RETACRIT) injection 20,000 Units  20,000 Units SubCUTAneous Q MON, WED & FRI  meropenem (MERREM) 500 mg in 0.9% sodium chloride (MBP/ADV) 50 mL  0.5 g IntraVENous Q6H  
 acetylcysteine (MUCOMYST) 200 mg/mL (20 %) solution 1,200 mg  1,200 mg Nebulization TID RT  
 levETIRAcetam (KEPPRA) tablet 250 mg  250 mg Oral BID  potassium chloride SR (KLOR-CON 10) tablet 20 mEq  20 mEq Oral BID  senna-docusate (PERICOLACE) 8.6-50 mg per tablet 1 Tab  1 Tab Oral PRN  
 collagenase (SANTYL) 250 unit/gram ointment   Topical DAILY  fludrocortisone (FLORINEF) tablet 0.1 mg  0.1 mg Oral DAILY  cholecalciferol (VITAMIN D3) (1000 Units /25 mcg) tablet 2 Tab  2,000 Units Oral DAILY  venlafaxine-SR (EFFEXOR-XR) capsule 150 mg  150 mg Oral DAILY WITH BREAKFAST  hydrocortisone (CORTAID) 1 % cream   Topical TID PRN  thiamine HCL (B-1) tablet 100 mg  100 mg Oral DAILY  oxybutynin (DITROPAN) tablet 5 mg  5 mg Oral Q6H PRN  
 magnesium oxide (MAG-OX) tablet 800 mg  800 mg Oral BID  lidocaine (URO-JET/ GLYDO) 2 % jelly   Urethral PRN  
 albuterol-ipratropium (DUO-NEB) 2.5 MG-0.5 MG/3 ML  3 mL Nebulization Q6H PRN  therapeutic multivitamin (THERAGRAN) tablet 1 Tab  1 Tab Oral DAILY  alteplase (CATHFLO) 1 mg in sterile water (preservative free) 1 mL injection  1 mg InterCATHeter PRN  finasteride (PROSCAR) tablet 5 mg  5 mg Oral DAILY  diphenhydrAMINE (BENADRYL) capsule 25 mg  25 mg Oral QHS PRN  
 octreotide (SANDOSTATIN) injection 25 mcg  25 mcg SubCUTAneous TID  pantoprazole (PROTONIX) tablet 40 mg  40 mg Oral ACB  arformoterol (BROVANA) neb solution 15 mcg  15 mcg Nebulization BID RT  And  
  budesonide (PULMICORT) 500 mcg/2 ml nebulizer suspension  500 mcg Nebulization BID RT  
 lactobac ac& pc-s.therm-b.anim (TIAN Q/RISAQUAD)  1 Cap Oral DAILY  oxyCODONE IR (ROXICODONE) tablet 5 mg  5 mg Oral Q6H PRN  
 tamsulosin (FLOMAX) capsule 0.4 mg  0.4 mg Oral DAILY  Warfarin MD/NP dosing   Other PRN  
 sodium chloride (NS) flush 5-40 mL  5-40 mL IntraVENous Q8H  
 sodium chloride (NS) flush 5-40 mL  5-40 mL IntraVENous PRN  
 acetaminophen (TYLENOL) tablet 650 mg  650 mg Oral Q6H PRN  
 naloxone (NARCAN) injection 0.4 mg  0.4 mg IntraVENous PRN  
 ondansetron (ZOFRAN) injection 4 mg  4 mg IntraVENous Q4H PRN  
 sucralfate (CARAFATE) tablet 1 g  1 g Oral AC&HS  influenza vaccine 2019-20 (6 mos+)(PF) (FLUARIX/FLULAVAL/FLUZONE QUAD) injection 0.5 mL  0.5 mL IntraMUSCular PRIOR TO DISCHARGE  hydrALAZINE (APRESOLINE) 20 mg/mL injection 20 mg  20 mg IntraVENous Q6H PRN Thank you for allowing me to participate in this patient's care. TIERRA Womack [address] [place] [address], [address] Phone: [phone] Fax: [phone] [facility] ATTENDING ADDENDUM Patient was seen and examined in person. Data and notes were reviewed. I have discussed and agree with the plan as noted in the NP note above without further additions. Gene Glynn MD PhD 
[address] Rued[address] [facility]

## 2020-01-21 NOTE — PROGRESS NOTES
TRANSFER - IN REPORT: 
 
Verbal report received from Sanjeev Pina RN(name) on [address]  being received from [facility](unit) for ordered procedure Report consisted of patients Situation, Background, Assessment and  
Recommendations(SBAR). Information from the following report(s) SBAR and Cardiac Rhythm paced was reviewed with the receiving nurse. Opportunity for questions and clarification was provided.

## 2020-01-21 NOTE — PROGRESS NOTES
Dr. David Nguyễn made aware the patient had a fluid bolus. See MAR. Orders received to discontinue fluids.

## 2020-01-21 NOTE — ROUTINE PROCESS
Cardiac Cath Lab Recovery Arrival Note: 
 
 
Fiona Lama arrived to Cardiac Cath Lab, Recovery Area. Staff introduced to patient. Patient identifiers verified with NAME and DATE OF BIRTH. Procedure verified with patient. Consent forms reviewed and signed by patient or authorized representative and verified. Allergies verified. Patient informed of procedure and plan of care. Questions answered with review. Patient prepped for procedure, per orders from physician, prior to arrival. 
 
Patient on cardiac monitor, non-invasive blood pressure, SPO2 monitor. Patient is A&Ox 4. Patient reports no complaints. Patient in stretcher, in low position, with side rails up, call bell within reach, patient instructed to call of assistance as needed. Patient prep in: [facility] Recovery Area, Bed# 7. Family in: waiting room. Prep by: SALONI Howard

## 2020-01-21 NOTE — PROCEDURES
Cardiac Catheterization Lab Procedure Note [facility] 
 
Patient: Marissa Villalba  MRN: [id number]  SSN: [id number] YOB: 1950 Age: 71 y.o. Sex: male Date of Procedure: 1/21/2020 Pre-procedure Diagnosis: Congestive Heart Failure Post-procedure Diagnosis: Congestive Heart Failure Procedure: Right Heart Cath :  Dr. Cuba Zazueta MD 
 
Assistant(s):  None Anesthesia: Moderate Sedation Estimated Blood Loss: Less than 10 mL Specimens Removed: None Findings: Low right and left sided filling pressures with normal cardiac output and cardiac index. Normal PA pressures with normal PVR. A 22-gauge needle/angiocath was used to obtain an arterial sample from the right femoral artery for calculation of cardiac output (using 125*BSA formula). Results discussed with SARAI Escobar attending on service. Full report to follow. Complications: None Implants:  None Signed by:  
Wu Alvarado. Priya Nava MD 
Structural / Interventional Cardiology [facility] 01/21/20 9:26 AM

## 2020-01-21 NOTE — PROGRESS NOTES
2345 Bedside and Verbal shift change report given to Puneet Morales (oncoming nurse) by Fawn Dempsey RN (offgoing nurse). Report included the following information SBAR, Kardex, MAR, Cardiac Rhythm Paced w/ underlying AFIB and Alarm Parameters . 7030 Bedside and Verbal shift change report given to Anna Jain RN (oncoming nurse) by Dandy Christina RN (offgoing nurse). Report included the following information SBAR, Kardex, MAR, Med Rec Status and Alarm Parameters .

## 2020-01-21 NOTE — PROGRESS NOTES
[facility] VAD/Heart Failure Progress Note Admit Date: 10/25/2019 POD:  Day of Surgery Procedure:  Procedure(s): RIGHT HEART CATH Subjective: Dyspnea, fatigue, and weakness; tremors Objective:  
Vitals: 
Blood pressure 91/72, pulse 96, temperature 97.9 °F (36.6 °C), resp. rate 20, height 6' 2\" (1.88 m), weight 171 lb 8.3 oz (77.8 kg), SpO2 97 %. Temp (24hrs), Av.6 °F (36.4 °C), Min:97.4 °F (36.3 °C), Max:97.9 °F (36.6 °C) Hemodynamics: 
 CO: CO (l/min): 5.8 l/min CI: CI (l/min/m2): 2.8 l/min/m2 CVP: CVP (mmHg): 4 mmHg (19 1645) SVR: SVR (dyne*sec)/cm5: 1080 (dyne*sec)/cm5 (19 1200) PAP Systolic: PAP Systolic: 34 (65/89/83 3417) PAP Diastolic: PAP Diastolic: 13 (62/86/98 1003) PVR:   
 SV02: SVO2 (%): 67 % (19 1300) SCV02: SCVO2 (%): 75 % (10/29/19 1900) VAD Interrogation: LVAD (Heartmate) Pump Speed (RPM): 6000 Pump Flow (LPM): 5 Chatter in Lines: No 
PI (Pulsitility Index): 4.3 Power: 4.8 MAP: 82  Test: No 
Back Up  at Bedside & Labeled: No 
Power Module Test: No 
Driveline Site Care: No 
Driveline Dressing: Clean, Dry, and Intact EKG/Rhythm:   
 
Extubation Date / Time:  
 
CT Output:  
 
Ventilator: 
Ventilator Volumes Vt Set (ml): 550 ml (19 08) Vt Exhaled (Machine Breath) (ml): 639 ml (19 08) Vt Spont (ml): 437 ml (19 1035) Ve Observed (l/min): 7.25 l/min (19 1035) Oxygen Therapy: 
Oxygen Therapy O2 Sat (%): 97 % (20 1508) Pulse via Oximetry: 50 beats per minute (20 1100) O2 Device: Nasal cannula (20 1555) PEEP/CPAP (cm H2O): 2 cm H20 (20 1555) O2 Flow Rate (L/min): 2 l/min (20 1508) FIO2 (%): 21 % (20 09) CXR: 
 
Admission Weight: Last Weight Weight: 192 lb 10.9 oz (87.4 kg) Weight: 171 lb 8.3 oz (77.8 kg) Intake / Output / Drain: 
Current Shift: 701 -  190 In: 80 [P.O.:480; I.V.:100] Out: 175 [Urine:175] Last 24 hrs.:  
 
Intake/Output Summary (Last 24 hours) at 1/21/2020 1659 Last data filed at 1/21/2020 8814 Gross per 24 hour Intake 1230 ml Output 1400 ml Net -170 ml No results for input(s): CPK, CKMB, TROIQ in the last 72 hours. Recent Labs  
  01/20/20 
4029 01/19/20 
3829  134* K 4.4 3.9 CO2 26 25 BUN 25* 25* CREA 1.05 1.00 GLU 93 105* MG 2.1 1.9 WBC 3.6* 3.3* HGB 10.1* 9.8* HCT 32.7* 32.2*  
PLT 89* 69* Recent Labs  
  01/20/20 
0744 01/19/20 
6129 INR 2.1* 2.3* PTP 20.3* 21.9* APTT 38.7* 42.7* No lab exists for component: PBNP Current Facility-Administered Medications:  
  albumin human 5% (BUMINATE) solution 12.5 g, 12.5 g, IntraVENous, ONCE, Shana Sims, NP 
  sodium chloride (NS) flush 5-40 mL, 5-40 mL, IntraVENous, PRN, Josefina Pedraza MD 
  naloxone [facility]) injection 0.4 mg, 0.4 mg, IntraVENous, PRN, Delvin De Leon MD 
  hydrALAZINE (APRESOLINE) 20 mg/mL injection 20 mg, 20 mg, IntraVENous, Q6H PRN, Shana Sims, NP 
  clonazePAM (KlonoPIN) tablet 0.25 mg, 0.25 mg, Oral, QHS PRN, Levi Shana B, NP 
  [START ON 1/22/2020] epoetin yvonne-epbx (RETACRIT) injection 20,000 Units, 20,000 Units, SubCUTAneous, Q MON, WED & FRI, Shana Sims B, NP 
  meropenem (MERREM) 500 mg in 0.9% sodium chloride (MBP/ADV) 50 mL, 0.5 g, IntraVENous, Q6H, Jose Douglas MD, Last Rate: 100 mL/hr at 01/21/20 1641, 500 mg at 01/21/20 1641   acetylcysteine (MUCOMYST) 200 mg/mL (20 %) solution 1,200 mg, 1,200 mg, Nebulization, TID RT, Hanna Cho MD, 1,200 mg at 01/21/20 1554   levETIRAcetam (KEPPRA) tablet 250 mg, 250 mg, Oral, BID, Javed Beck MD, 250 mg at 01/21/20 1147   potassium chloride SR (KLOR-CON 10) tablet 20 mEq, 20 mEq, Oral, BID, Shana Sims NP, 20 mEq at 01/21/20 1147 
  senna-docusate (PERICOLACE) 8.6-50 mg per tablet 1 Tab, 1 Tab, Oral, PRN, Soledad Sosa MD, 1 Tab at 01/18/20 6388   collagenase (SANTYL) 250 unit/gram ointment, , Topical, DAILY, Levi, Shana B, NP 
  fludrocortisone (FLORINEF) tablet 0.1 mg, 0.1 mg, Oral, DAILY, Levi, Shana B, NP, 0.1 mg at 01/21/20 1147   cholecalciferol (VITAMIN D3) (1000 Units /25 mcg) tablet 2 Tab, 2,000 Units, Oral, DAILY, Polliard, Hamp Pallas T, NP, 2 Tab at 01/21/20 1147 
  venlafaxine-SR (EFFEXOR-XR) capsule 150 mg, 150 mg, Oral, DAILY WITH BREAKFAST, Polliard, Hamp Pallas T, NP, 150 mg at 01/21/20 1147   hydrocortisone (CORTAID) 1 % cream, , Topical, TID PRN, Alhaji Altman MD 
  thiamine HCL (B-1) tablet 100 mg, 100 mg, Oral, DAILY, Nam Wilmington E, NP, 100 mg at 01/21/20 1147 
  oxybutynin (DITROPAN) tablet 5 mg, 5 mg, Oral, Q6H PRN, Levi, Shana B, NP, 5 mg at 01/01/20 1204   magnesium oxide (MAG-OX) tablet 800 mg, 800 mg, Oral, BID, Nam Wilmington E, NP, 800 mg at 01/21/20 1147   lidocaine (URO-JET/ GLYDO) 2 % jelly, , Urethral, PRN, Mounika Altman MD 
  albuterol-ipratropium (DUO-NEB) 2.5 MG-0.5 MG/3 ML, 3 mL, Nebulization, Q6H PRN, Cecilie Bougie Rojelio Bernheim, MD, 3 mL at 01/21/20 1555   therapeutic multivitamin (THERAGRAN) tablet 1 Tab, 1 Tab, Oral, DAILY, Levi, Shana B, NP, 1 Tab at 01/21/20 1147 
  alteplase (CATHFLO) 1 mg in sterile water (preservative free) 1 mL injection, 1 mg, InterCATHeter, PRN, Mounika Finney MD, 1 mg at 01/20/20 1156   finasteride (PROSCAR) tablet 5 mg, 5 mg, Oral, DAILY, Cliff Head MD, 5 mg at 01/21/20 1147   diphenhydrAMINE (BENADRYL) capsule 25 mg, 25 mg, Oral, QHS PRN, Polliard, Hamp Pallas T, NP, 25 mg at 01/19/20 0044   octreotide (SANDOSTATIN) injection 25 mcg, 25 mcg, SubCUTAneous, TID, Polliard, Hamp Pallas T, NP, 25 mcg at 01/21/20 1512   pantoprazole (PROTONIX) tablet 40 mg, 40 mg, Oral, ACB, Polliard, Hamp Pallas T, NP, 40 mg at 01/21/20 0607 
  arformoterol (BROVANA) neb solution 15 mcg, 15 mcg, Nebulization, BID RT, 15 mcg at 01/21/20 0725 **AND** budesonide (PULMICORT) 500 mcg/2 ml nebulizer suspension, 500 mcg, Nebulization, BID RT, Paula Herrera, NP, 500 mcg at 01/21/20 0724 
  lactobac ac& pc-s.therm-b.anim (TIAN Q/RISAQUAD), 1 Cap, Oral, DAILY, Janna Orr MD, 1 Cap at 01/21/20 1147 
  oxyCODONE IR (ROXICODONE) tablet 5 mg, 5 mg, Oral, Q6H PRN, Author Smith MD, 5 mg at 12/30/19 0405   tamsulosin (FLOMAX) capsule 0.4 mg, 0.4 mg, Oral, DAILY, Logan Kincaid MD, 0.4 mg at 01/21/20 1147   Warfarin MD/NP dosing, , Other, PRN, Mounika Altman MD 
  sodium chloride (NS) flush 5-40 mL, 5-40 mL, IntraVENous, Q8H, Harshal Bolanos MD, 10 mL at 01/21/20 1321 
  sodium chloride (NS) flush 5-40 mL, 5-40 mL, IntraVENous, PRN, Author Smith MD, 10 mL at 01/20/20 1326   acetaminophen (TYLENOL) tablet 650 mg, 650 mg, Oral, Q6H PRN, Author Smith MD, 650 mg at 01/21/20 1512 
  naloxone [facility]) injection 0.4 mg, 0.4 mg, IntraVENous, PRN, Author Smith MD 
  ondansetron [facility]) injection 4 mg, 4 mg, IntraVENous, Q4H PRN, Author Smith MD, 4 mg at 01/20/20 1318   sucralfate (CARAFATE) tablet 1 g, 1 g, Oral, AC&HS, Author Smith MD, 1 g at 01/21/20 1641   influenza vaccine 2019-20 (6 mos+)(PF) (FLUARIX/FLULAVAL/FLUZONE QUAD) injection 0.5 mL, 0.5 mL, IntraMUSCular, PRIOR TO DISCHARGE, Mounika Altman MD 
 
 
A/P 
  
S/P LVAD - good flows Need for anti-coagulation - coumadin DM - insulin RV dysfunction - milrinone, diuretics  
  
Risk of morbidity and mortality - high Medical decision making - high complexity Signed By: Marty Reaves MD

## 2020-01-21 NOTE — PROGRESS NOTES
Cardiac Cath Lab Procedure Area Arrival Note: 
 
Anabel Herrera arrived to Cardiac Cath Lab, Procedure Area. Patient identifiers verified with NAME and DATE OF BIRTH. Procedure verified with patient. Consent forms verified. Allergies verified. Patient informed of procedure and plan of care. Questions answered with review. Patient voiced understanding of procedure and plan of care. Patient on cardiac monitor, non-invasive blood pressure, SPO2 monitor. On  or O2 @ 4 lpm via NC.  IV of NS on pump at 10 ml/hr. Patient status doing well without problems. Patient is A&Ox 4. Patient reports no pain. Patient medicated during procedure with orders obtained and verified by Dr. Edgar Chu. Refer to patients Cardiac Cath Lab PROCEDURE REPORT for vital signs, assessment, status, and response during procedure, printed at end of case. Printed report on chart or scanned into chart. TRANSFER - OUT REPORT: 
 
Verbal report given to RT Sheyla (BRICE)  on Anabel Herrera being transferred to Cath Lab Recovery for routine progression of care Report consisted of patients Situation, Background, Assessment and  
Recommendations(SBAR). Information from the following report(s) SBAR, Procedure Summary and MAR was reviewed with the receiving nurse. Opportunity for questions and clarification was provided.

## 2020-01-21 NOTE — PROGRESS NOTES
0745 Pt transported to Cath Lab 
 
1129 Pt returned from Cath Lab. Telemetry placed and confirmed with monitor tech. Expiratory wheezing noted in bilateral upper lungs [address], NP, informed of expiratory wheezing and 500mL bolus of fluids given in Cath Lab. VORB to reschedule albumin for 1900 this evening. 1409 Pt transported to nuclear med 1455 Pt returned to unit [address], NP, notified of trending MAPs. VORB to administer hydralazine for SBP > 110 instead of SBP >100. Problem: Falls - Risk of 
Goal: *Absence of Falls Description Document Teofilo Morales Fall Risk and appropriate interventions in the flowsheet. Outcome: Progressing Towards Goal 
Note: Fall Risk Interventions: 
Mobility Interventions: Communicate number of staff needed for ambulation/transfer, Patient to call before getting OOB, PT Consult for mobility concerns, Utilize gait belt for transfers/ambulation Mentation Interventions: Adequate sleep, hydration, pain control, Door open when patient unattended Medication Interventions: Evaluate medications/consider consulting pharmacy, Patient to call before getting OOB, Teach patient to arise slowly, Utilize gait belt for transfers/ambulation Elimination Interventions: Call light in reach, Patient to call for help with toileting needs, Stay With Me (per policy), Toilet paper/wipes in reach, Toileting schedule/hourly rounds History of Falls Interventions: Consult care management for discharge planning, Door open when patient unattended, Evaluate medications/consider consulting pharmacy, Room close to nurse's station, Utilize gait belt for transfer/ambulation, Assess for delayed presentation/identification of injury for 48 hrs (comment for end date), Vital signs minimum Q4HRs X 24 hrs (comment for end date) Problem: Patient Education: Go to Patient Education Activity Goal: Patient/Family Education Outcome: Progressing Towards Goal 
  
 Problem: Pressure Injury - Risk of 
Goal: *Prevention of pressure injury Description Document Mich Scale and appropriate interventions in the flowsheet. Offload heels Turn approximately every 2 hours Outcome: Progressing Towards Goal 
Note: Pressure Injury Interventions: 
Sensory Interventions: Assess need for specialty bed, Chair cushion, Float heels, Keep linens dry and wrinkle-free, Maintain/enhance activity level, Minimize linen layers, Pressure redistribution bed/mattress (bed type), Turn and reposition approx. every two hours (pillows and wedges if needed) Moisture Interventions: Absorbent underpads, Minimize layers, Offer toileting Q_hr Activity Interventions: Chair cushion, Increase time out of bed, Pressure redistribution bed/mattress(bed type) Mobility Interventions: Assess need for specialty bed, Chair cushion, HOB 30 degrees or less, Pressure redistribution bed/mattress (bed type), Turn and reposition approx. every two hours(pillow and wedges) Nutrition Interventions: Document food/fluid/supplement intake Friction and Shear Interventions: Lift sheet, Minimize layers Problem: Patient Education: Go to Patient Education Activity Goal: Patient/Family Education Outcome: Progressing Towards Goal 
  
Problem: Heart Failure: Discharge Outcomes Goal: *Verbalizes understanding/describes prescribed medications Outcome: Progressing Towards Goal 
Goal: *Describes available resources and support systems Description 
(eg: Home Health, Palliative Care, Advanced Medical Directive) Outcome: Progressing Towards Goal 
Goal: *Describes importance of continuing daily weights and changes to report to physician Outcome: Progressing Towards Goal

## 2020-01-21 NOTE — PROGRESS NOTES
2349: Pt off floor to cath lab with vad trained staff. 1403: off floor to nuclear med with RN. PRECEPTOR REVIEW OF RN KATLINEE'S WORK: 
 
1/21/2020    Shift times- 0730 to 1953 The RN orientee's documentation of patient care for Marissa Villalba has been reviewed and approved. All medications have been administered under the direct supervision of the preceptor. Arabella Vuong

## 2020-01-21 NOTE — PROGRESS NOTES
[facility] [address], [address], [place]: 
Pump Speed: 5957 Pump Flow: 6.5 MAP: 100 PI: 2.4 Power: 6.3 Post-RHC 5603: 
Pump Speed: 6700 Pump Flow: 6.6 MAP: 100 PI: 2.4 Power: 6.4 Patient & LVAD equipment monitored during procedure. Khoa Sarabia RN 
LVAD Coordinator

## 2020-01-21 NOTE — WOUND CARE
Wound Care Note: Follow-up visit for sternal wound Chart shows: 
Admitted for acute decompensated heart failure and tremor s/p right axillary exploration with construction of 10 mm chimney graft used for introduction of a percutaneous left ventricular assist device with insertion of same 10/30/19 s/p cystoscopy 11/13/19 and left brachial artery thrombectomy 11/25/19 s/p removal of temporary left ventricular assist device, implantation of left ventricular assist device permanent 1/21/20 Past Medical History:  
Diagnosis Date  Degenerative disc disease, lumbar  Heart failure ([address]  High cholesterol  Hypertension  Paroxysmal atrial fibrillation ([address] 4/2/2019  Spinal stenosis WBC = 3.6 on 1/20/20 Assessment:  
Patient is A&O x 4, communicative, continent with no assistance needed in repositioning. Bed: Total Care Diet: Cardiac regular; 2 gram sodium with nutritional supplements Patient reports no pain. Bilateral heels skin intact with blanchable erythema. Bilateral buttocks sacral skin intact and without erythema. 1. Distal sternal wound measuring smaller 1.2 cm x 1 cm x 0.3 cm, wound bed with some pink tissue along lateral edges 97% loose slough and 3% pink, scant tan drainage, wound edges are open, david-wound intact. Santyl moist to dry applied. Patient repositioned supine. Heels offloaded on pillows. Recommendations:   
Continue with current wound care. Sternal wound-  Daily cleanse with normal saline, wipe wound bed clean and dry, apply nickel thickness of Santyl ointment, apply small piece of gauze that has been moistened with normal saline, cover with gauze. Skin Care & Pressure Prevention: 
Minimize layers of linen/pads under patient to optimize support surface. Turn/reposition approximately every 2 hours and offload heels. Manage incontinence / promote continence Nourishing Skin Cream to dry skin Discussed above plan with patient & Jose Luis Juarez RN Transition of Care: Plan to follow as needed while admitted to hospital. 
 
PAULA BishopN, RN, [facility], [facility]. 
office [phone] 
pager [phone] or call  to page

## 2020-01-21 NOTE — PROGRESS NOTES
Chart reviewed and noted patient MIRNA in CCL/PL at this time. Will follow up for OT intervention as able. Thank you.

## 2020-01-21 NOTE — PROGRESS NOTES
Physical Therapy 1/21/2020 Chart reviewed. Noted patient MIRNA in CCL/PL at this time. Will follow up for PT interventions as able. Thank you.   
Angelica Lay, PT, DPT

## 2020-01-22 NOTE — PROGRESS NOTES
[address] [facility] [place] [address] [facility] [place] in [address], [place] Inpatient Progress Note Patient name: Jessica Fisher Patient : 1950 Patient MRN: [id number] Attending MD: Ciera Stevens MD 
Date of service: 20 Chief Complaint:  
Sarah Piedra azucena LVAD implant 
  
HPI: Sona Kiser is a 71y.o. year old pleasant white male with a history of HTN, HLD, JOSE, CAD s/p cardiac arrest VFib s/p CABG () c/b sternal would infection and sternectomy, ischemic cardiomyopathy LVEF 15-20%, s/p ICD and with LBBB. He was admitted with acute on chronic systolic heart failure with massive volume overload > 20 lbs, in the setting of atrial fibrillation s/p failed DCCV and underwent DCCV and RHC on .  S/p BiVICD on 19 with Dr. Sherie Millan. He was discharged home home on IV milrinone on 19. Stephy Frost has been followed closely by Dr. Michelle Barber and the [facility]. 
  
Mr. Kiser was admitted for acute on chronic systolic heart failure. He underwent implantation of Impella 5.0 due to CS and has completed his LVAD evaluation. Stephy Frost meets criteria for implant of HM3 as DT. He was NYHA Class IV prior to implant of Impella 5.0, has LVEF < 15%, was intolerant of GDMT due to symptomatic hypotension and renal dysfunction. He remains dependent on temporary MCS support. RHC  revealed compensated hemodynamics on Impella. His renal function has improved dramatically with improvement in his hemodynamics. He is not a suitable transplant candidate due to single kidney, sternectomy, debility, and frailty. He was reviewed by Curt Wells and felt to be a high risk heart transplant candidate due to multiple co-morbidities as well as the afore mentioned conditions.  He remained in the CCU and underwent a HM 3 implant as DT on . He was weaned off of pressors and transferred to [place] [address] where he continues to undergo PT/OT and hemodynamic optimization.   
  
24Hr Events:  
No acute overnight events Remains orthostatic PYP testing negative Tremors unchanged Procedure:  Procedure(s): REMOVAL TEMPORARY LEFT VENTRICULAR ASSIST DEVICE, IMPLANTATION OF LEFT VENTRICULAR ASSIST DEVICE PERMANENT (VAD), ECC, JACQUE, EPI AORTIC US BY DR Rossy Mari.    
POD:  65 
  
Impression / Plan:  
Heart Failure Status: NYHA Class IV, improved to NYHA Class III Chronic systolic heart failure due to ICM, NYHA Class IV, EF < 15%, cardiogenic shock bridged with Impella 5.0 support to HM 3 implant S/p Impella removal and LVAD implant 11/18/19 RHC this morning, CI 3, filling pressures low Set speed to 6000rpm due to hypovolemia NS IVF overnight for 12 hours Repeat TTE this afternoon with current speed of 6000RPM 
Multiple PI events on interrogation Discontinued Sildenafil due to leukopenia Intolerant of BB due to RV failure Intolerant of ACEi/ARB/ARNI/AA due to JUAN J on CKD 3 Intolerant of spironolactone d/t IVVD Discontinue midodrine, MAPs >80 No diuretics or fluid restriction Strict I/O, daily weights Continue LVAD education Dressing change frequency three times weekly, sutures removed 12/26/19 Chest CTA- no outflow graft occlusion Pet Scan ordered- pending PYP testing equivocal for amyloid Send Invitae- pending Bacteremia - Pseudomonas Blood cultures (12/31/19) 2/4 bottles +Pseudomonas & 2/4 bottles GNRs Repeat BC NGTD Stop cefepime- due to myoclonus Cont meropenem Follow procalcitonin and lactic acid levels - both improving Repeat sputum- few yeast, normal soren Repeat UA negative Orthostatic Hypotension Cont Florinef 0.1 mg daily Stim test unremarkable Remains orthostatic Leukopenia Peripheral smear-(1/15/2020) Pancytopenia without dysmyelopoiesis, dyserythropoiesis or platelet aggregation Hematology recommendations appreciated- likely nutritional or medications Stopped allopurinol, revatio, levaquin Thrombocytopenia Peripheral smear-(1/15/2020) Pancytopenia without dysmyelopoiesis, dyserythropoiesis or platelet aggregation Plt 109 today Hematology following, appreciate assistance May be secondary to nutritional deficiency HIT negative Generalized myoclonus Increased Appreciate Neurology input Restarted Keppra 250mg BID- monitor for dizziness May consider Olanzapine or Risperdal- will d/w with neurology Head CT negative for acute process EEG suggestive of mild generalized encephalopathic process, possibly related to underlying structural brain injury vs metabolic abnormality Monitor closely Off Digoxin  
  
Anticoagulation for LVAD, INR goal 1.5-2 Goal decreased d/t persistent hematuria No ASA d/t hematuria INR 2.5 Coumadin - 1mg tonight Acute Respiratory Failure post op Improved Wean FiO2 as able Pulmonary hygiene - recommend RT use flutter valve with nebs & Incentive spirometer Cont home CPAP- must use while sleeping Repeat PFTs today CKD 3, atrophic left kidney Improved Appreciate Nephrology assistance- signed off Watch Cr, stable Avoid nephrotoxins, trend labs  
  
CAD s/p CABG Off ASA d/t hematuria No BB d/t RV failure Hold statin due to recent hepatic failure LHC performed 11/13 - low likelihood of benefit from revascularization  
  
Hx of VF arrest s/p BiV-ICD No recent shocks Keep K > 4 and Mg > 2  
Mag ox 800 mg po BID- watch for diarrhea Decrease potassium to 20meq daily ICD reprogrammed to reduce diaphragmatic stimulation PAF Off Dig due to lethargy and tremors No BB due to RV failure Repeat TFTs WNL 
  
Acute blood loss anemia Improved Likely due to hematuria Cont octreotide 25 mcg SQ TID Fecal occult 12/14 negative, positive on 12/17 Appreciate urology recommendations 
  
Urinary retention, c/b serratia UTI and hematuria Resolved Repeat UA with cx negative 1/13/20 Cystoscopy performed 11/13 shows catheter induced cystitis Pseudomonas aeruginosa positive UA culture (12/31/19) Stopped levaquin and cefepime for tremors Cont Meropenem Severe Acute on Chronic Protein Calorie Malnutrition Prealbumin weekly - 16.9 on 1/20 Appreciate Nutritionist assistance Diet as tolerated, encourage food from home, protein shakes Intolerant of mirtazapine d/t tremors, confusion Cont MVI, thiamine daily Staff to assist with meals COPD Appreciate Pulmonology assistance Continue nebs PRN   
  
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  
  
Hx of sternal wound infection, sternectomy Sternum closed post op- monitor Delayed skin healing mid portion of sternotomy - evaluated by Dr. Elda Anguiano, will continue IV abx and wet to dry dressings. Will likely require sternal wire after ~3 months from op date Vocal cord paralysis Improved ENT recs appreciated Speech therapy following - appreciate input Anxiety Cont klonopin to 0.25mg QHS prn  
Monitor Depression Cont Effexor to 150 mg PO qAM  
Monitor rhythm strips for prolonged QTc 
  
Debility Continue PT/OT  
OOB at least x3 daily/ all meals Bladder spasms Received pyridium 100mg x4 doses Oxybutynin 5mg Q6hr prn  
  
Ppx Protonix PT/OT  
+daily BM  
PICC placed 11/22/19 Labs QOD Do Not Disturb at night from 10p-6a 
  
Dispo: 
Remain in [facility] at this time Will need IP rehab. Not ready for discharge at this time LVAD INTERROGATION: 
Device interrogated in person Device function normal, normal flow, PI events LVAD Pump Speed (RPM): 6000 Pump Flow (LPM): 4.6 MAP: 104 PI (Pulsitility Index): 6.1 Power: 4.8  Test: Yes 
Back Up  at Bedside & Labeled: Yes Power Module Test: Yes Driveline Site Care: No 
Driveline Dressing: Clean, Dry, and Intact Outpatient: No 
MAP in Therapeutic Range (Outpatient): Yes Testing Alarms Reviewed: Yes 
Back up SC speed: 6000 Back up Low Speed Limit: 5600 Emergency Equipment with Patient?: Yes Emergency procedures reviewed?: Yes Drive line site inspected?: No(covered by dressing) Drive line intergrity inspected?: Yes Drive line dressing changed?: No 
 
PHYSICAL EXAM: 
Visit Vitals BP 91/72 (BP 1 Location: Left arm, BP Patient Position: At rest) Pulse 85 Temp 97.7 °F (36.5 °C) Resp 20 Ht 6' 2\" (1.88 m) Wt 170 lb 13.7 oz (77.5 kg) SpO2 98% BMI 21.94 kg/m² Physical Exam  
Constitutional: He is oriented to person, place, and time. He appears well-developed. He appears cachectic. No distress. Frustrated with tremors HENT:  
Head: Normocephalic. Neck: Normal range of motion. Neck supple. Cardiovascular: Normal rate, regular rhythm and normal heart sounds. + VAD hum Pulmonary/Chest: Effort normal. Tachypnea noted. No respiratory distress. He has rhonchi. Abdominal: Soft. Bowel sounds are normal. He exhibits no distension. Musculoskeletal: Normal range of motion. General: No edema. Neurological: He is alert and oriented to person, place, and time. He displays tremor. More lethargic today Skin: Skin is warm and dry. Psychiatric: His speech is normal and behavior is normal. His mood appears anxious. ~1 cm x 1 cm x 0.25 cm area of delayed closure on sternotomy. Site clean, no erythema or drainage noted. Subcutaneous tissue noted. Nursing note and vitals reviewed. REVIEW OF SYSTEMS: 
Review of Systems Constitutional: Positive for malaise/fatigue. Negative for chills and fever. Tired HENT: Positive for hearing loss. Negative for nosebleeds. Eyes: Negative. Respiratory: Negative for cough and shortness of breath. Cardiovascular: Negative for chest pain, palpitations, orthopnea and leg swelling. Orthostatic Gastrointestinal: Negative for heartburn, nausea and vomiting. Genitourinary: Negative for dysuria and hematuria. Musculoskeletal: Negative. Neurological: Positive for tremors and weakness. Negative for dizziness and headaches. Mild dizziness - improving Endo/Heme/Allergies: Does not bruise/bleed easily. Psychiatric/Behavioral: The patient is nervous/anxious. PAST MEDICAL HISTORY: 
Past Medical History:  
Diagnosis Date  Degenerative disc disease, lumbar  Heart failure ([facility] [address]  High cholesterol  Hypertension  Paroxysmal atrial fibrillation ([facility] [address] 4/2/2019  Spinal stenosis PAST SURGICAL HISTORY: 
Past Surgical History:  
Procedure Laterality Date  COLONOSCOPY Left 11/11/2019 COLONOSCOPY at bedside performed by Mari Hernández MD at [address]  HX CORONARY ARTERY BYPASS GRAFT    
 triple  HX HERNIA REPAIR    
 HX IMPLANTABLE CARDIOVERTER DEFIBRILLATOR  OH CARDIOVERSION ELECTIVE ARRHYTHMIA EXTERNAL N/A 6/10/2019 EP CARDIOVERSION performed by Kendall Stallworth MD at [address] CATH LAB  OH CARDIOVERSION ELECTIVE ARRHYTHMIA EXTERNAL N/A 6/18/2019 EP CARDIOVERSION performed by Bradly Slade MD at [address] CATH LAB  OH INSJ/RPLCMT PERM DFB W/TRNSVNS LDS 1/DUAL CHMBR N/A 6/21/2019 INSERT ICD BIV MULTI performed by Manuela Soriano MD at [address] CATH LAB  OH TCAT IMPL WRLS P-ART PRS SNR L-T HEMODYN MNTR N/A 9/18/2019 IMPLANT HEART FAILURE MONITORING DEVICE performed by Jannie Melton MD at [address] CATH LAB FAMILY HISTORY: 
Family History Problem Relation Age of Onset  Lung Disease Mother  Hypertension Mother Merula.Awkward Arthritis-osteo Mother  Heart Disease Mother  Heart Disease Father  Diabetes Father SOCIAL HISTORY: 
Social History Socioeconomic History  Marital status:  Spouse name: Not on file  Number of children: Not on file  Years of education: Not on file  Highest education level: Not on file Tobacco Use  Smoking status: Former Smoker Last attempt to quit: 2010 Years since quittin.1  Smokeless tobacco: Never Used Substance and Sexual Activity  Alcohol use: Not Currently Comment: rarely  Drug use: Never Other Topics Concern LABORATORY RESULTS: 
  
Labs Latest Ref Rng & Units 2020 2020 2020 2020 2020 2020 1/15/2020 WBC 4.1 - 11.1 K/uL 4.2 3.6(L) 3. 3(L) 2. 6(L) 2. 5(L) 2. 4(L) -  
RBC 4.10 - 5.70 M/uL 3.35(L) 3.42(L) 3.32(L) 3.11(L) 3.25(L) 3.22(L) - Hemoglobin 12.1 - 17.0 g/dL 9.8(L) 10. 1(L) 9.8(L) 9.2(L) 9.5(L) 9.5(L) - Hematocrit 36.6 - 50.3 % 33. 0(L) 32. 7(L) 32. 2(L) 29. 5(L) 30. 8(L) 30. 5(L) -  
MCV 80.0 - 99.0 FL 98.5 95.6 97.0 94.9 94.8 94.7 - Platelets 921 - 628 K/uL 109(L) 89(L) 69(L) 68(L) 78(L) 83(L) - Lymphocytes 12 - 49 % - 8(L) 11(L) 19 10(L) - - Monocytes 5 - 13 % - 9 5 12 6 - - Eosinophils 0 - 7 % - 0 2 4 2 - - Basophils 0 - 1 % - 1 0 1 0 - - Bands 0 - 6 % - 0 2 2 3 - - Blasts 0 % - 0 0 0 0 - - Albumin 3.5 - 5.0 g/dL 2. 2(L) 2. 2(L) 2. 1(L) 2. 1(L) 2. 2(L) 2. 0(L) -  
Calcium 8.5 - 10.1 MG/DL 9.0 8.9 8.6 8.4(L) 8.4(L) 8. 1(L) -  
SGOT 15 - 37 U/L 18 21 29 31 38(H) 57(H) -  
Glucose 65 - 100 mg/dL 88 93 105(H) 92 103(H) 114(H) -  
BUN 6 - 20 MG/DL 23(H) 25(H) 25(H) 25(H) 23(H) 23(H) -  
Creatinine 0.70 - 1.30 MG/DL 1.06 1.05 1.00 0.99 0.97 1.02 - Sodium 136 - 145 mmol/L 137 137 134(L) 133(L) 131(L) 132(L) - Potassium 3.5 - 5.1 mmol/L 4.9 4.4 3.9 4.1 3.8 3. 2(L) -  
TSH 0.36 - 3.74 uIU/mL - - - - - - -  
LDH 85 - 241 U/L 204 245(H) 274(H) 251(H) 252(H) 253(H) 224 Some recent data might be hidden Lab Results Component Value Date/Time TSH 2.30 2019 03:29 AM  
 TSH 2.40 10/25/2019 07:39 PM  
 TSH 2.45 2019 04:16 AM  
 
 
ALLERGY: 
No Known Allergies CURRENT MEDICATIONS: 
Current Facility-Administered Medications Medication Dose Route Frequency  0.9% sodium chloride infusion  75 mL/hr IntraVENous CONTINUOUS  
  warfarin (COUMADIN) tablet 1 mg  1 mg Oral ONCE  
 sodium chloride (NS) flush 5-40 mL  5-40 mL IntraVENous PRN  
 naloxone (NARCAN) injection 0.4 mg  0.4 mg IntraVENous PRN  
 hydrALAZINE (APRESOLINE) 20 mg/mL injection 20 mg  20 mg IntraVENous Q6H PRN  
 clonazePAM (KlonoPIN) tablet 0.25 mg  0.25 mg Oral QHS PRN  
 epoetin yvonne-epbx (RETACRIT) injection 20,000 Units  20,000 Units SubCUTAneous Q MON, WED & FRI  meropenem (MERREM) 500 mg in 0.9% sodium chloride (MBP/ADV) 50 mL  0.5 g IntraVENous Q6H  
 acetylcysteine (MUCOMYST) 200 mg/mL (20 %) solution 1,200 mg  1,200 mg Nebulization TID RT  
 levETIRAcetam (KEPPRA) tablet 250 mg  250 mg Oral BID  potassium chloride SR (KLOR-CON 10) tablet 20 mEq  20 mEq Oral BID  senna-docusate (PERICOLACE) 8.6-50 mg per tablet 1 Tab  1 Tab Oral PRN  
 collagenase (SANTYL) 250 unit/gram ointment   Topical DAILY  fludrocortisone (FLORINEF) tablet 0.1 mg  0.1 mg Oral DAILY  cholecalciferol (VITAMIN D3) (1000 Units /25 mcg) tablet 2 Tab  2,000 Units Oral DAILY  venlafaxine-SR (EFFEXOR-XR) capsule 150 mg  150 mg Oral DAILY WITH BREAKFAST  hydrocortisone (CORTAID) 1 % cream   Topical TID PRN  thiamine HCL (B-1) tablet 100 mg  100 mg Oral DAILY  oxybutynin (DITROPAN) tablet 5 mg  5 mg Oral Q6H PRN  
 magnesium oxide (MAG-OX) tablet 800 mg  800 mg Oral BID  lidocaine (URO-JET/ GLYDO) 2 % jelly   Urethral PRN  
 albuterol-ipratropium (DUO-NEB) 2.5 MG-0.5 MG/3 ML  3 mL Nebulization Q6H PRN  therapeutic multivitamin (THERAGRAN) tablet 1 Tab  1 Tab Oral DAILY  alteplase (CATHFLO) 1 mg in sterile water (preservative free) 1 mL injection  1 mg InterCATHeter PRN  finasteride (PROSCAR) tablet 5 mg  5 mg Oral DAILY  diphenhydrAMINE (BENADRYL) capsule 25 mg  25 mg Oral QHS PRN  
 octreotide (SANDOSTATIN) injection 25 mcg  25 mcg SubCUTAneous TID  pantoprazole (PROTONIX) tablet 40 mg  40 mg Oral ACB  arformoterol (BROVANA) neb solution 15 mcg  15 mcg Nebulization BID RT And  
 budesonide (PULMICORT) 500 mcg/2 ml nebulizer suspension  500 mcg Nebulization BID RT  
 lactobac ac& pc-s.therm-b.anim (TIAN Q/RISAQUAD)  1 Cap Oral DAILY  oxyCODONE IR (ROXICODONE) tablet 5 mg  5 mg Oral Q6H PRN  
 tamsulosin (FLOMAX) capsule 0.4 mg  0.4 mg Oral DAILY  Warfarin MD/NP dosing   Other PRN  
 sodium chloride (NS) flush 5-40 mL  5-40 mL IntraVENous Q8H  
 sodium chloride (NS) flush 5-40 mL  5-40 mL IntraVENous PRN  
 acetaminophen (TYLENOL) tablet 650 mg  650 mg Oral Q6H PRN  
 naloxone (NARCAN) injection 0.4 mg  0.4 mg IntraVENous PRN  
 ondansetron (ZOFRAN) injection 4 mg  4 mg IntraVENous Q4H PRN  
 sucralfate (CARAFATE) tablet 1 g  1 g Oral AC&HS  influenza vaccine 2019-20 (6 mos+)(PF) (FLUARIX/FLULAVAL/FLUZONE QUAD) injection 0.5 mL  0.5 mL IntraMUSCular PRIOR TO DISCHARGE Thank you for allowing me to participate in this patient's care. TIERRA Orourke [address] [place] [address], [address] Phone: [phone] Fax: [phone] [facility] ATTENDING ADDENDUM Patient was seen and examined in person. Data and notes were reviewed. I have discussed and agree with the plan as noted in the NP note above without further additions. Lyndsey Jain MD PhD 
[address] [facility]

## 2020-01-22 NOTE — PROGRESS NOTES
Problem: Self Care Deficits Care Plan (Adult) Goal: *Acute Goals and Plan of Care (Insert Text) Description FUNCTIONAL STATUS PRIOR TO ADMISSION: Patient was modified independent using a single point cane for functional mobility. Patient able to shower and dress himself. However, patient required assistance for household management from his wife. HOME SUPPORT: The patient lived alone with wife to provide assistance. Occupational Therapy Goals all updated/continued as follows 1/17/2020 1. Patient will perform upper body dressing including management of LVAD with supervision/setup within 7 day(s) 2. Patient will perform lower body dressing with RW CGA within 7 day(s). -continue goal (MOD A simulated 1/17) 3. Patient will perform grooming standing with RW 2 mins with supervision/setup within 7 day(s). -continue goal (CGA 1/17) 4. Patient will perform toilet transfers with RW supervision/setup using LRAD within 7 day(s). 5.  Patient will perform all aspects of toileting with RW with minimal assistance within 7 day(s). 6.  Patient will perform gathering ADL items high and waist height 2/2 with RW supervision within 7 days. -continue goal (CGA 1/17) Occupational Therapy Goals all updated 1/10/2020 1. Patient will perform upper body dressing including management of LVAD with min A within 7 day(s) 2. Patient will perform lower body dressing with RW CGA within 7 day(s). 3.  Patient will perform grooming standing with RW 2 mins with supervision/setup within 7 day(s). 4.  Patient will perform toilet transfers with RW minimum assistance within 7 day(s). 5.  Patient will perform all aspects of toileting with RW with moderate assistance within 7 day(s). 6.  Patient will perform gathering ADL items high and waist height 2/2 with RW supervision within 7 days. Continue all goals 10/30/19 post re-eval for Impella removal  
Initiated 10/28/2019 1.  Patient will perform bathing with supervision/set-up within 7 day(s). 2.  Patient will perform lower body dressing with supervision/set-up within 7 day(s). 3.  Patient will perform grooming with modified independence within 7 day(s). 4.  Patient will perform toilet transfers with modified independence within 7 day(s). 5.  Patient will perform all aspects of toileting with supervision/set-up within 7 day(s). 6.  Patient will participate in upper extremity therapeutic exercise/activities with supervision/set-up for 5 minutes within 7 day(s). 7.  Patient will utilize energy conservation techniques during functional activities with verbal cues within 7 day(s). Outcome: Progressing Towards Goal 
 OCCUPATIONAL THERAPY TREATMENT Patient: Ciro Bradford (75 y.o. male) Date: 1/22/2020 Diagnosis: Acute decompensated heart failure ([facility] [address] [I50.9] <principal problem not specified> Procedure(s) (LRB): 
RIGHT HEART CATH (N/A) 1 Day Post-Op Precautions: Fall Chart, occupational therapy assessment, plan of care, and goals were reviewed. ASSESSMENT Patient continues with skilled OT services and is progressing towards goals. Patient continues to present with generalized weakness, decreased endurance, and decreased activity tolerance, impaired coordination (tremors), and impaired balance with patient limited by continued orthostatic hypotension. Patient compliant with \"move in the tube\" precautions and attempted EOB activity today. MAP was 110 at beginning of session at rest in bed. Patient only tolerated 3 minutes of sitting prior to becoming dizzy and requesting to return to supine with MAP 39 (unknown if this was accurate). Re-checked in supine and MAP was 99. Patient completed BUE cardiac exercises in supine. OT provided patient with several ADL items and patient practiced opening different sized containers.  OT also facilitated patient unscrewing and screwing power leads for ease with LVAD switchovers (3 sets with each lead). Patient able to verbalize 5/5 LVAD terms accurately today. Continue to recommend IPR when patient medically stable for discharge. Current Level of Function Impacting Discharge (ADLs): MOD A UB ADLs; MOD A LB ADLs (when last able to stand-prior to orthostatic hypotension) Other factors to consider for discharge: LVAD HM III 
    
 
PLAN : 
Patient continues to benefit from skilled intervention to address the above impairments. Continue treatment per established plan of care. to address goals. Recommend with staff: OOB x 3 to chair; BUE cardiac exercises; LVAD switchovers (if NOT hemodynamically stable, please still attempt sitting EOB with nursing staff; patient stating PO intake difficult due to BUE tremors, needs assist with feeding tasks) Recommend next OT session: sitting tolerance for ADLs Recommendation for discharge: (in order for the patient to meet his/her long term goals) Therapy 3 hours per day 5-7 days per week This discharge recommendation: 
Has been made in collaboration with the attending provider and/or case management IF patient discharges home will need the following DME: TBD SUBJECTIVE:  
Patient stated I feel nauseous and I shake when I try to eat.  OBJECTIVE DATA SUMMARY:  
Cognitive/Behavioral Status: 
Neurologic State: Alert Orientation Level: Oriented X4 Cognition: Appropriate for age attention/concentration Perception: Appears intact Perseveration: No perseveration noted Safety/Judgement: Decreased insight into deficits; Decreased awareness of need for safety;Decreased awareness of need for assistance Functional Mobility and Transfers for ADLs: 
Bed Mobility: 
Supine to Sit: Contact guard assistance Sit to Supine: Contact guard assistance Transfers: 
 Could not safely attempt. Balance: 
Sitting: Impaired; Without support Sitting - Static: Fair (occasional) Sitting - Dynamic: Poor (constant support) ADL Intervention: Lower Body Dressing Assistance Socks: Moderate assistance(A for LLE in bed; tremulous) Cognitive Retraining Safety/Judgement: Decreased insight into deficits; Decreased awareness of need for safety;Decreased awareness of need for assistance Therapeutic Exercises:  
Patient instructed on the benefits and demonstrated cardiac exercises while at rest/supine with Stand-by assistance. Instructed and indicated understanding on how to progress reps, sets against gravity, pacing through progressive muscle strengthening standing based on surgeon clearance for more weight in prep for basic and instrumental ADLs. Instruction on the use of household items in place of weights as needed. CARDIAC EXERCISE Sets Reps Active  Active Assist   
Passive  Self ROM Comments Shoulder flexion  1  5   [x]                            []                             []                             []                               
Shoulder abduction  1  5  [x]                             []                             []                             []                               
Scapular elevation  1  5  [x]                             []                              []                             []                               
Scapular retraction  1  5  [x]                             []                             []                             []                               
Trunk rotation Trunk sidebending Activity Tolerance:  
Fair, requires rest breaks and signs and symptoms of orthostatic hypotension Please refer to the flowsheet for vital signs taken during this treatment. After treatment patient left in no apparent distress:  
Supine in bed, Call bell within reach and Caregiver / family present COMMUNICATION/COLLABORATION:  
 The patients plan of care was discussed with: Physical Therapist and Registered Nurse Georgi Garcia Time Calculation: 31 mins

## 2020-01-22 NOTE — PROGRESS NOTES
NUTRITION COMPLETE ASSESSMENT 
 
 
 
RECOMMENDATIONS:  
1. Spoke with RN who changed diet back to Regular diet- encourage PO intakes. Adjusted ONS -wants Glucerna vanilla again. Trial Boost Pudding 2. Continue to encourage oral intake - family may bring foods from home Please document  Severe Acute on Chronic Protein Calorie Malnutrition in patient diagnoses. Patient meets criteria for as evidenced by:  
ASPEN Malnutrition Criteria Acute Illness, Chronic Illness, or Social/Enviornmental: Acute illness Energy Intake: <75% est energy req for > 7 days Weight Loss: Greater than 7.5% x 3 mos Body Fat Loss: Moderate Muscle Mass Loss: Moderate Fluid Accumulation: Moderate ASPEN Malnutrition Score - Acute Illness: 19 Acute Illness - Malnutrition Diagnosis: Severe malnutrition. Interventions/Plan:  
Food/Nutrient Delivery:  (-) Commercial supplement(see below)   (d/c marinol) Initiate enteral nutrition Assessment:  
Reason for Assessment: Reassessment Diet: regular + snack Supplements: Ensure Enlive 3x/day Nutritionally Significant Medications: [x] Reviewed & Includes: insulin, FloraQ, MagOx, Carafate, Flomax, MultiVit, Coumadin, Protonix,  
 
Meal intake:  
Patient Vitals for the past 168 hrs: 
 % Diet Eaten 01/22/20 0821 50 % 01/21/20 1830 15 % 01/20/20 1914 10 % 01/20/20 1520 50 % 01/20/20 0953 50 % 01/19/20 0742 50 % 01/18/20 1501 100 % 01/18/20 0744 75 % 01/15/20 1357 50 % Subjective: Pt having more trouble with eating due to tremors. Unable to keep food on spoon/fork to feed self. Please provide assistance eating when spouse not bedside. Discussed finger foods that may be better options with current tremors. Objective: 
Pt admitted for CHF. PMHx: HTN, CKD, chronic systolic heart failure, depression, others noted. S/p removal of impella (placed 10/29) and LVAD placement on 11/18. Rt heart cath 1/21/2020.  Bacteremia with likely urinary source noted, abx rx. Lethargic today. Milrinone drip off, midodrine rx. Pt feeling okay today. Had procedure yesterday. When diet resumed, it was started again as Cardiac and pt couldn't order a few items he wanted. Pt doing fair with PO intakes and appetite. Understands importance of eating and is trying to do well. Pt does meet criteria for severe malnutrition with: (1) Severe wt loss over past 6 months (2) Severe muscle/fat wasting (3) Inadequate oral intake. - improving but only meeting needs with supplements. Added Boost pudding for trial in attempt to get more kcal/protein in. Estimated Nutrition Needs:  
Kcals/day: 2045 Kcals/day(1887-2045kcal) Protein: 75 g(75-90g (1-1.2g/kg - CKD)) Fluid: 1887 ml(1ml/kcal) Based On: [place] Jeor(x 1.2-1.3) Weight Used: Actual wt(74.7kg) Pt expected to meet estimated nutrient needs:  [x]   Yes []  No [] Unable to predict at this time Nutrition Diagnosis: 1. Malnutrition related to CHF vs depression vs malignancy as evidenced by >10% weight loss x 6 months; severe muscle/fat wasting; consuming >70% needs orally 
- continues 2. Inadequate oral intake related to poor appetite as evidenced by less than 50% most meals consumed; only 1-2 supplements/day 
- improving 3. Swallowing difficulty(resolved) related to weakness with moderate pharyngeal dysphagia as evidenced by regular with thin liquids 
- resolved Goals:   
 Initiation of EN to meet 75% needs in 48hrs Monitoring & Evaluation: - Total energy intake, Liquid meal replacement, Enteral/parenteral nutrition intake - Weight/weight change Previous Nutrition Goals Met:   Progressing Previous Recommendations:    Yes 
 
Education & Discharge Needs: 
 [x] None Identified 
 [] Identified and addressed [x] Participated in care plan, discharge planning, and/or interdisciplinary rounds Cultural, Sabianism and ethnic food preferences identified: None Skin Integrity: []Intact  [x]Other: surgical, healing Edema: [x]None []Other Last BM: 1/21 Food Allergies: [x]None []Other Diet Restrictions: Cultural/[employer]ist Preference(s): None Anthropometrics:   
Weight Loss Metrics 1/22/2020 10/25/2019 10/25/2019 10/25/2019 9/30/2019 9/18/2019 9/16/2019 Today's Wt 170 lb 13.7 oz - 193 lb 6.4 oz - 199 lb 14.4 oz 196 lb 199 lb BMI - 21.94 kg/m2 - 24.83 kg/m2 25.67 kg/m2 25.16 kg/m2 25.55 kg/m2 Weight Source: Bed Height: 6' 2\" (188 cm), Body mass index is 21.94 kg/m². IBW : 86.2 kg (190 lb), % IBW (Calculated): 96.32 % 
 ,   
 
Labs:   
Lab Results Component Value Date/Time Sodium 137 01/22/2020 04:13 AM  
 Potassium 4.9 01/22/2020 04:13 AM  
 Chloride 105 01/22/2020 04:13 AM  
 CO2 29 01/22/2020 04:13 AM  
 Glucose 88 01/22/2020 04:13 AM  
 BUN 23 (H) 01/22/2020 04:13 AM  
 Creatinine 1.06 01/22/2020 04:13 AM  
 Calcium 9.0 01/22/2020 04:13 AM  
 Magnesium 2.1 01/22/2020 04:13 AM  
 Phosphorus 3.3 11/27/2019 04:18 AM  
 Albumin 2.2 (L) 01/22/2020 04:13 AM  
 
Lab Results Component Value Date/Time Hemoglobin A1c 6.6 (H) 10/25/2019 02:56 PM  
 
 
Nathaly Conway, [id number] [facility] [place]

## 2020-01-22 NOTE — PROGRESS NOTES
[facility] VAD/Heart Failure Progress Note Admit Date: 10/25/2019 POD:  1 Day Post-Op Procedure:  Procedure(s): RIGHT HEART CATH Subjective: Dyspnea, fatigue, and weakness; tremors Objective:  
Vitals: 
Blood pressure 91/72, pulse 85, temperature 98 °F (36.7 °C), resp. rate 20, height 6' 2\" (1.88 m), weight 170 lb 13.7 oz (77.5 kg), SpO2 97 %. Temp (24hrs), Av.8 °F (36.6 °C), Min:97.7 °F (36.5 °C), Max:98 °F (36.7 °C) Hemodynamics: 
 CO: CO (l/min): 5.8 l/min CI: CI (l/min/m2): 2.8 l/min/m2 CVP: CVP (mmHg): 4 mmHg (19 1645) SVR: SVR (dyne*sec)/cm5: 1080 (dyne*sec)/cm5 (19 1200) PAP Systolic: PAP Systolic: 34 (58/27/69 5414) PAP Diastolic: PAP Diastolic: 13 (81/15/82 1615) PVR:   
 SV02: SVO2 (%): 67 % (19 1300) SCV02: SCVO2 (%): 75 % (10/29/19 1900) VAD Interrogation: LVAD (Heartmate) Pump Speed (RPM): 6000 Pump Flow (LPM): 5 Chatter in Lines: No 
PI (Pulsitility Index): 6 Power: 5.1 MAP: 99  Test: Yes 
Back Up  at Bedside & Labeled: Yes Power Module Test: Yes Driveline Site Care: No 
Driveline Dressing: Clean, Dry, and Intact EKG/Rhythm:   
 
Extubation Date / Time:  
 
CT Output:  
 
Ventilator: 
Ventilator Volumes Vt Set (ml): 550 ml (19 08) Vt Exhaled (Machine Breath) (ml): 639 ml (19 08) Vt Spont (ml): 437 ml (19 1035) Ve Observed (l/min): 7.25 l/min (19 1035) Oxygen Therapy: 
Oxygen Therapy O2 Sat (%): 97 % (20) Pulse via Oximetry: 79 beats per minute (20 1021) O2 Device: Nasal cannula (20) PEEP/CPAP (cm H2O): 2 cm H20 (20 1555) O2 Flow Rate (L/min): 2 l/min (20 113) FIO2 (%): 21 % (20 0905) CXR: 
 
Admission Weight: Last Weight Weight: 192 lb 10.9 oz (87.4 kg) Weight: 170 lb 13.7 oz (77.5 kg) Intake / Buchanan Gibran / Drain: 
Current Shift:  07 -  1900 In: 320 [P.O.:320] Out: 1175 [EDRM] Last 24 hrs.:  
 
Intake/Output Summary (Last 24 hours) at 2020 1355 Last data filed at 2020 1203 Gross per 24 hour Intake 1690 ml Output 2000 ml Net -310 ml No results for input(s): CPK, CKMB, TROIQ in the last 72 hours. Recent Labs  
  20 
0413 20 
2431  137  
K 4.9 4.4  
CO2 29 26 BUN 23* 25* CREA 1.06 1.05  
GLU 88 93 MG 2.1 2.1 WBC 4.2 3.6* HGB 9.8* 10.1* HCT 33.0* 32.7*  
* 89* Recent Labs  
  20 
0413 20 
7770 INR 2.6* 2.1* PTP 24.7* 20.3* APTT  --  38.7* No lab exists for component: PBNP Current Facility-Administered Medications:  
  0.9% sodium chloride infusion, 75 mL/hr, IntraVENous, CONTINUOUS, Levi, Shana B, NP 
  warfarin (COUMADIN) tablet 1 mg, 1 mg, Oral, ONCE, Levi, Shana B, NP 
  [START ON 2020] potassium chloride SR (KLOR-CON 10) tablet 20 mEq, 20 mEq, Oral, DAILY, Levi, Shana B, NP 
  OLANZapine (ZyPREXA) tablet 2.5 mg, 2.5 mg, Oral, QPM, Levi, Shana B, NP 
  sodium chloride (NS) flush 5-40 mL, 5-40 mL, IntraVENous, PRN, Sigifredo Enciso, Anand Onofre MD 
  naloxone [facility]) injection 0.4 mg, 0.4 mg, IntraVENous, PRN, Wolfgang Macias MD 
  hydrALAZINE (APRESOLINE) 20 mg/mL injection 20 mg, 20 mg, IntraVENous, Q6H PRN, Levi, Shana B, NP 
  clonazePAM (KlonoPIN) tablet 0.25 mg, 0.25 mg, Oral, QHS PRN, Levi, Shana B, NP 
  epoetin yvonne-epbx (RETACRIT) injection 20,000 Units, 20,000 Units, SubCUTAneous, Q MON, WED & FRI, Shana Sims, NP 
  meropenem (MERREM) 500 mg in 0.9% sodium chloride (MBP/ADV) 50 mL, 0.5 g, IntraVENous, Q6H, Saranya Aguilar MD, Last Rate: 100 mL/hr at 20 1203, 500 mg at 20 1203   acetylcysteine (MUCOMYST) 200 mg/mL (20 %) solution 1,200 mg, 1,200 mg, Nebulization, TID RT, [facility] Abigail Briggs MD, 1,200 mg at 20 1019   levETIRAcetam (KEPPRA) tablet 250 mg, 250 mg, Oral, BID, Ebony, Khalida Bullock MD, 250 mg at 01/22/20 0820 
  senna-docusate (PERICOLACE) 8.6-50 mg per tablet 1 Tab, 1 Tab, Oral, PRN, Vika Ibarra MD, 1 Tab at 01/18/20 0777   collagenase (SANTYL) 250 unit/gram ointment, , Topical, DAILY, Levi, Shana B, NP 
  fludrocortisone (FLORINEF) tablet 0.1 mg, 0.1 mg, Oral, DAILY, Levi, Shana B, NP, 0.1 mg at 01/22/20 8713   cholecalciferol (VITAMIN D3) (1000 Units /25 mcg) tablet 2 Tab, 2,000 Units, Oral, DAILY, Preston Herrera NP, 2 Tab at 01/22/20 0820 
  venlafaxine-SR (EFFEXOR-XR) capsule 150 mg, 150 mg, Oral, DAILY WITH BREAKFAST, Preston Herrera NP, 150 mg at 01/22/20 5321   hydrocortisone (CORTAID) 1 % cream, , Topical, TID PRN, Elijah Altman MD 
  thiamine HCL (B-1) tablet 100 mg, 100 mg, Oral, DAILY, Lorriane Floyd ACUNA, NP, 100 mg at 01/22/20 0820 
  oxybutynin (DITROPAN) tablet 5 mg, 5 mg, Oral, Q6H PRN, Levi, Shana B, NP, 5 mg at 01/01/20 1204   magnesium oxide (MAG-OX) tablet 800 mg, 800 mg, Oral, BID, Lorriane Floyd E, NP, 800 mg at 01/22/20 1022   lidocaine (URO-JET/ GLYDO) 2 % jelly, , Urethral, PRN, Mounika Altman MD 
  albuterol-ipratropium (DUO-NEB) 2.5 MG-0.5 MG/3 ML, 3 mL, Nebulization, Q6H PRN, Jn Kirby MD, 3 mL at 01/21/20 1555   therapeutic multivitamin (THERAGRAN) tablet 1 Tab, 1 Tab, Oral, DAILY, Levi, Shana B, NP, 1 Tab at 01/22/20 0820 
  alteplase (CATHFLO) 1 mg in sterile water (preservative free) 1 mL injection, 1 mg, InterCATHeter, PRN, Mounika Nelson MD, 1 mg at 01/20/20 1156   finasteride (PROSCAR) tablet 5 mg, 5 mg, Oral, DAILY, Narda Marks MD, 5 mg at 01/22/20 2251   diphenhydrAMINE (BENADRYL) capsule 25 mg, 25 mg, Oral, QHS PRN, Angelo Herrera NP, 25 mg at 01/19/20 0044   octreotide (SANDOSTATIN) injection 25 mcg, 25 mcg, SubCUTAneous, TID, Preston Herrera NP, 25 mcg at 01/22/20 3369   pantoprazole (PROTONIX) tablet 40 mg, 40 mg, Oral, ACB, Angelo Herrera T, NP, 40 mg at 01/22/20 0634 
  arformoterol (BROVANA) neb solution 15 mcg, 15 mcg, Nebulization, BID RT, 15 mcg at 01/22/20 1015 **AND** budesonide (PULMICORT) 500 mcg/2 ml nebulizer suspension, 500 mcg, Nebulization, BID RT, Sharon, Alon Corbin, NP, 500 mcg at 01/22/20 1015 
  lactobac ac& pc-s.therm-b.anim (TIAN Q/RISAQUAD), 1 Cap, Oral, DAILY, Jamal Stratton MD, 1 Cap at 01/22/20 0820 
  oxyCODONE IR (ROXICODONE) tablet 5 mg, 5 mg, Oral, Q6H PRN, Nghai Quijano MD, 5 mg at 12/30/19 0405   tamsulosin (FLOMAX) capsule 0.4 mg, 0.4 mg, Oral, DAILY, Logan Kincaid MD, 0.4 mg at 01/22/20 0820   Warfarin MD/NP dosing, , Other, PRN, Mounika Altman MD 
  sodium chloride (NS) flush 5-40 mL, 5-40 mL, IntraVENous, Q8H, Nghia Quijano MD, 10 mL at 01/22/20 6071   sodium chloride (NS) flush 5-40 mL, 5-40 mL, IntraVENous, PRN, Nghia Quijano MD, 10 mL at 01/20/20 1326   acetaminophen (TYLENOL) tablet 650 mg, 650 mg, Oral, Q6H PRN, Nghia Quijano MD, 650 mg at 01/21/20 1512 
  naloxone [facility]) injection 0.4 mg, 0.4 mg, IntraVENous, PRN, Nghia Quijano MD 
  ondansetron [facility]) injection 4 mg, 4 mg, IntraVENous, Q4H PRN, Nghia Quijano MD, 4 mg at 01/20/20 1318   sucralfate (CARAFATE) tablet 1 g, 1 g, Oral, AC&HS, Nghia Quijano MD, 1 g at 01/22/20 1255   influenza vaccine 2019-20 (6 mos+)(PF) (FLUARIX/FLULAVAL/FLUZONE QUAD) injection 0.5 mL, 0.5 mL, IntraMUSCular, PRIOR TO DISCHARGE, Mounika Altman MD 
 
A/P 
  
S/P LVAD - good flows Need for anti-coagulation - coumadin DM - insulin RV dysfunction - milrinone, diuretics  
  
Risk of morbidity and mortality - high Medical decision making - high complexity Signed By: Sherryle Distance, MD

## 2020-01-22 NOTE — PROGRESS NOTES
0730: Bedside shift change report given to Latasha WILKES (oncoming nurse) by Daniel Oh RN (offgoing nurse). Report included the following information SBAR, Kardex, Procedure Summary, Intake/Output, MAR, and Recent Results. 1400: Verified with PET scan for scheduled scan on Monday at 1100.  
 
1805: Driveline dressing changed using sterile technique. No drainage noted. Erythema noted at the driveline site. 1930: Bedside shift change report given to Keeley Garrison (oncoming nurse) by Kassidy Rogers RN (offgoing nurse). Report included the following information SBAR, Kardex, Procedure Summary, Intake/Output, MAR and Recent Results. Problem: Falls - Risk of 
Goal: *Absence of Falls Description Document Francia Swift Fall Risk and appropriate interventions in the flowsheet. Outcome: Progressing Towards Goal 
Note: Fall Risk Interventions: 
Mobility Interventions: Patient to call before getting OOB, PT Consult for mobility concerns, PT Consult for assist device competence Mentation Interventions: Adequate sleep, hydration, pain control, Door open when patient unattended, Evaluate medications/consider consulting pharmacy, Increase mobility, More frequent rounding, Room close to nurse's station Medication Interventions: Assess postural VS orthostatic hypotension, Patient to call before getting OOB, Utilize gait belt for transfers/ambulation Elimination Interventions: Call light in reach, Patient to call for help with toileting needs, Urinal in reach History of Falls Interventions: Door open when patient unattended, Investigate reason for fall, Room close to nurse's station, Utilize gait belt for transfer/ambulation Problem: Patient Education: Go to Patient Education Activity Goal: Patient/Family Education Outcome: Progressing Towards Goal 
  
Problem: Pain Goal: *Control of Pain Outcome: Progressing Towards Goal 
  
Problem: Pressure Injury - Risk of 
Goal: *Prevention of pressure injury Description Document Mich Scale and appropriate interventions in the flowsheet. Offload heels Turn approximately every 2 hours Outcome: Progressing Towards Goal 
Note: Pressure Injury Interventions: 
Sensory Interventions: Keep linens dry and wrinkle-free, Maintain/enhance activity level Moisture Interventions: Assess need for specialty bed, Apply protective barrier, creams and emollients Activity Interventions: Increase time out of bed, Pressure redistribution bed/mattress(bed type), PT/OT evaluation Mobility Interventions: HOB 30 degrees or less, Pressure redistribution bed/mattress (bed type), PT/OT evaluation Nutrition Interventions: Document food/fluid/supplement intake, Discuss nutritional consult with provider Friction and Shear Interventions: Apply protective barrier, creams and emollients, Feet elevated on foot rest, Foam dressings/transparent film/skin sealants, HOB 30 degrees or less, Lift sheet, Transferring/repositioning devices Problem: Patient Education: Go to Patient Education Activity Goal: Patient/Family Education Outcome: Progressing Towards Goal 
  
Problem: LVAD Post-op Day 15 to Discharge Goal: Diagnostic Test/Procedures Outcome: Progressing Towards Goal 
Goal: Nutrition/Diet Outcome: Progressing Towards Goal 
Goal: Medications Outcome: Progressing Towards Goal

## 2020-01-22 NOTE — PROGRESS NOTES
ID Progress Note 2020 Subjective: He states that he is feeling okay Objective: Antibiotics: 1. Levaquin 2. Cefepime 3. Rifampin 4. Fluconazole 5. Vancomycin 6. Cefazolin 7. Zosyn 8. [place] Vitals:  
Visit Vitals BP 91/72 (BP 1 Location: Left arm, BP Patient Position: At rest) Pulse 72 Temp 97.7 °F (36.5 °C) Resp 20 Ht 6' 2\" (1.88 m) Wt 77.5 kg (170 lb 13.7 oz) SpO2 99% BMI 21.94 kg/m² Tmax:  Temp (24hrs), Av.8 °F (36.6 °C), Min:97.7 °F (36.5 °C), Max:98 °F (36.7 °C) Exam:  Lungs: A few basilar rales Heart:  regular rate and rhythm Abdomen:  soft, non-tender. Bowel sounds normal. No masses,  no organomegaly Urine in neal is grossly clear Sternal wound is dressed and dry Labs:     
Recent Labs  
  20 
0413 20 
4529 WBC 4.2 3.6* HGB 9.8* 10.1*  
* 89*  
BUN 23* 25* CREA 1.06 1.05  
SGOT 18 21 * 125* TBILI 0.6 0.5 Cultures: No results found for: DANIELLE Lab Results Component Value Date/Time Culture result: LIGHT YEAST (A) 2020 10:01 AM  
 Culture result: LIGHT NORMAL RESPIRATORY TIAN 2020 10:01 AM  
 Culture result: NO GROWTH 5 DAYS 2020 09:52 AM  
 
 
Radiology:  
 
Line/Insert Date:        
 
Assessment:  
 
1. UTI--Serratia (2 biotypes) from culture and small amount of Enterococcus--now with Pseudomonas from culture of urine and blood 2. CHF/cardiomyopathy 3. ?aspiration event(s) 4. Renal insufficiency 5. Respiratory difficulties Objective: 1. Adjust antibiotic therapy 2. Presence of LVAD in face of bacteremia may require longer course of therapy, or at least PO Rx for a time 3. Work-up any fever 4. Follow-up pending cultures and studies Damion Carlson MD

## 2020-01-23 NOTE — CONSULTS
PULMONARY/CRITICAL CARE/SLEEP MEDICINE Initial Physician Consultation Note Name: Arianna Wynne : 1950 MRN: [id number] Date: 2020 Subjective:  
Consult Note: 2020 Requesting Physician: Rojas Sena Reason for consult: optimize respiratory medications, recent PFTs Medical records and data reviewed. Patient is a 71 y.o. male who has been hospitalized for class IV CHF and under went LVAD replacement. He underwent PFTs yesterday that demonstrated presence of severe expiratory airflow limitation. Preoperative PFTs had suggested restrictive abnormality. Patient denies worsening cough or wheezing. He feels current bronchodilator regimen that consists of Brovana and budesonide have been effective in relieving symptoms. Chest x-ray shows minimal residual pleural effusions. He was seen earlier during hospitalization by our team perioperatively Review of Systems: A comprehensive 12 system review of systems was negative except for as documented in HPI Assessment:  
 
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  
 
Patient is currently receiving a combination of long-acting beta-2 agonist and inhaled corticosteroid with prn duonebs, he wishes to continue nebulized bronchodilator regimen and this can be his discharge regimen. Eventual conversion to MDI preparation can be considered as an outpatient No indication for systemic steroids at present Continue mobilization, PT and OT We are available to see him again if we can help and will be happy to arrange outpatient follow-up for COPD. Discussed with patient Active Problem List:  
 
Problem List  Date Reviewed: 9/3/2019 Codes Class Tremor ICD-10-CM: R25.1 ICD-9-CM: 781.0 Acute decompensated heart failure (HCC) ICD-10-CM: I50.9 ICD-9-CM: 428.0 [facility] ICD-10-CM: I73.9 ICD-9-CM: 443.9 Systolic CHF, chronic (HCC) ICD-10-CM: I50.22 ICD-9-CM: 428.22, 428.0 Acute on chronic systolic CHF (congestive heart failure) (HCC) ICD-10-CM: L18.89 ICD-9-CM: 428.23, 428.0 Paroxysmal atrial fibrillation (HCC) ICD-10-CM: I48.0 ICD-9-CM: 427.31 Past Medical History:  
 
 has a past medical history of Degenerative disc disease, lumbar, Heart failure ([facility] High cholesterol, Hypertension, Paroxysmal atrial fibrillation ([facility] (4/2/2019), and Spinal stenosis. Past Surgical History:  
 
 has a past surgical history that includes hx coronary artery bypass graft; hx appendectomy; hx hernia repair; hx implantable cardioverter defibrillator; pr cardioversion elective arrhythmia external (N/A, 6/10/2019); pr cardioversion elective arrhythmia external (N/A, 6/18/2019); pr insj/rplcmt perm dfb w/trnsvns lds 1/dual chmbr (N/A, 6/21/2019); pr tcat impl wrls p-art prs snr l-t hemodyn mntr (N/A, 9/18/2019); and colonoscopy (Left, 11/11/2019). Home Medications:  
 
Prior to Admission medications Medication Sig Start Date End Date Taking? Authorizing Provider  
sildenafil, pulm. hypertension, (REVATIO) 20 mg tablet Take 1 Tab by mouth three (3) times daily. 12/2/19  Yes Anthony Gloria NP  
apixaban (ELIQUIS) 5 mg tablet Take 5 mg by mouth two (2) times a day. Yes Provider, Historical  
escitalopram oxalate (LEXAPRO) 5 mg tablet TAKE 1 TABLET BY MOUTH EVERY DAY 10/10/19   Provider, Historical  
torsemide (DEMADEX) 20 mg tablet Take 3 Tabs by mouth two (2) times a day. 10/2/19   Carolin Patterson MD  
tamsulosin (FLOMAX) 0.4 mg capsule Take 0.4 mg by mouth daily. 7/18/19   Provider, Historical  
carvedilol (COREG) 6.25 mg tablet Take 1 Tab by mouth two (2) times daily (with meals).  7/31/19   Carolin Patterson MD  
 milrinone (PRIMACOR) 20 mg/100 mL (200 mcg/mL) infusion 18.14 mcg/min by IntraVENous route continuous. 19   Ry Gale MD  
aspirin delayed-release 81 mg tablet Take 81 mg by mouth daily. Provider, Historical  
spironolactone (ALDACTONE) 25 mg tablet Take 25 mg by mouth daily. Provider, Historical  
 
 
Allergies/Social/Family History: No Known Allergies Social History Tobacco Use  Smoking status: Former Smoker Last attempt to quit: 2010 Years since quittin.1  Smokeless tobacco: Never Used Substance Use Topics  Alcohol use: Not Currently Comment: rarely Family History Problem Relation Age of Onset  Lung Disease Mother  Hypertension Mother Judieth Namita Arthritis-osteo Mother  Heart Disease Mother  Heart Disease Father  Diabetes Father Objective:  
Vital Signs: 
Visit Vitals BP 91/72 (BP 1 Location: Left arm, BP Patient Position: At rest) Pulse 91 Temp 97.6 °F (36.4 °C) Resp 20 Ht 6' 2\" (1.88 m) Wt 77.5 kg (170 lb 13.7 oz) SpO2 100% BMI 21.94 kg/m² O2 Flow Rate (L/min): 2 l/min O2 Device: Nasal cannula Temp (24hrs), Av.7 °F (36.5 °C), Min:97.4 °F (36.3 °C), Max:97.8 °F (36.6 °C) CVP (mmHg): 4 mmHg (19 1645) Intake/Output:  
 
Intake/Output Summary (Last 24 hours) at 2020 1240 Last data filed at 2020 3688 Gross per 24 hour Intake 1575 ml Output 1675 ml Net -100 ml Physical Exam:  
General:  Alert, cooperative, no distress, appears stated age. Head:  Normocephalic, without obvious abnormality, atraumatic. Eyes:  Conjunctivae/corneas clear. PERRL Neck: Supple, symmetrical, no adenopathy, no carotid bruit and no JVD. Lungs:   Clear to auscultation bilaterally. Chest wall:   Median sternotomy, LVAD hum Heart:  Regular rate and rhythm, S1-S2 normal, no murmur, no click, rub or gallop. Abdomen:   Soft, non-tender. Bowel sounds present.  No masses,  No organomegaly. Extremities: Atraumatic, no cyanosis or edema. Pulses: Palpable Skin: No rashes or lesions Neurologic:  Generalized myoclonus LABS AND  DATA: Personally reviewed Recent Labs  
  01/22/20 0413 WBC 4.2 HGB 9.8* HCT 33.0*  
* Recent Labs  
  01/22/20 0413   
K 4.9  CO2 29 BUN 23* CREA 1.06  
GLU 88  
CA 9.0 MG 2.1 Recent Labs  
  01/22/20 0413 SGOT 18  
* TP 6.1* ALB 2.2*  
GLOB 3.9 Recent Labs  
  01/22/20 0413 INR 2.6* PTP 24.7* No results for input(s): PHI, PCO2I, PO2I, FIO2I in the last 72 hours. No results for input(s): CPK, CKMB, TROIQ, BNPP in the last 72 hours. MEDS: Reviewed Chest Imaging: personally reviewed and report checked Tele- reviewed Medical decision making:  
I have reviewed the flowsheet and previous day's notes Patient has acute or chronic illness that poses a threat to life or bodily function Review and order of Clinical lab tests Review and Order of Radiology tests Independent visualization of Image Thank you for allowing me to participate in this patient's care. Susa Frankel, MD 
 
 
[facility]

## 2020-01-23 NOTE — PROGRESS NOTES
[address] [facility] in [place], [place] Inpatient Progress Note Patient name: Anya Bingham Patient : 1950 Patient MRN: [id number] Attending MD: Mitch Ham MD 
Date of service: 20 Chief Complaint:  
Lissy Duron azucena LVAD implant 
  
HPI: Sona Kiser is a 71y.o. year old pleasant white male with a history of HTN, HLD, JOSE, CAD s/p cardiac arrest VFib s/p CABG () c/b sternal would infection and sternectomy, ischemic cardiomyopathy LVEF 15-20%, s/p ICD and with LBBB. He was admitted with acute on chronic systolic heart failure with massive volume overload > 20 lbs, in the setting of atrial fibrillation s/p failed DCCV and underwent DCCV and RHC on .  S/p BiVICD on 19 with Dr. Gio Lantigua. He was discharged home home on IV milrinone on 19. Dandy Trammell has been followed closely by Dr. Ethel Sinclair and the [facility]. 
  
Mr. Kiser was admitted for acute on chronic systolic heart failure. He underwent implantation of Impella 5.0 due to CS and has completed his LVAD evaluation. Dandy Trammell meets criteria for implant of HM3 as DT. He was NYHA Class IV prior to implant of Impella 5.0, has LVEF < 15%, was intolerant of GDMT due to symptomatic hypotension and renal dysfunction. He remains dependent on temporary MCS support. RHC  revealed compensated hemodynamics on Impella. His renal function has improved dramatically with improvement in his hemodynamics. He is not a suitable transplant candidate due to single kidney, sternectomy, debility, and frailty. He was reviewed by Bobby Rodriguez and felt to be a high risk heart transplant candidate due to multiple co-morbidities as well as the afore mentioned conditions.  He remained in the CCU and underwent a HM 3 implant as DT on . He was weaned off of pressors and transferred to [place] [address] where he continues to undergo PT/OT and hemodynamic optimization.   
  
24Hr Events:  
No acute overnight events Tremors unchanged Procedure:  Procedure(s): REMOVAL TEMPORARY LEFT VENTRICULAR ASSIST DEVICE, IMPLANTATION OF LEFT VENTRICULAR ASSIST DEVICE PERMANENT (VAD), ECC, JACQUE, EPI AORTIC US BY DR Marysol Clemente.    
POD:  66 
  
Impression / Plan:  
Heart Failure Status: NYHA Class IV, improved to NYHA Class III Chronic systolic heart failure due to ICM, NYHA Class IV, EF < 15%, cardiogenic shock bridged with Impella 5.0 support to HM 3 implant S/p Impella removal and LVAD implant 11/18/19 [address] 1/20/20, CI 3, filling pressures low Set speed to 5800rpm- order changed NS IVF overnight for 12 hours again this evening Repeat TTE this afternoon with current speed of 6000RPM 
Multiple PI events on interrogation Discontinued Sildenafil due to leukopenia Intolerant of BB due to RV failure Intolerant of ACEi/ARB/ARNI/AA due to JUAN J on CKD 3 Intolerant of spironolactone d/t IVVD Discontinue midodrine, MAPs >80 No diuretics or fluid restriction Strict I/O, daily weights Continue LVAD education Dressing change frequency three times weekly, sutures removed 12/26/19 Chest CTA- no outflow graft occlusion Pet Scan ordered- pending PYP testing equivocal for amyloid Send Invitae- pending Bacteremia - Pseudomonas Blood cultures (12/31/19) 2/4 bottles +Pseudomonas & 2/4 bottles GNRs Repeat BC NGTD Stop cefepime- due to myoclonus Cont meropenem Follow procalcitonin and lactic acid levels - both improving Repeat sputum- few yeast, normal soren Repeat UA negative Orthostatic Hypotension Cont Florinef 0.1 mg daily Stim test unremarkable Remains orthostatic Albumin today NS overnight Correlate BP cuff with MAPs Leukopenia Peripheral smear-(1/15/2020) Pancytopenia without dysmyelopoiesis, dyserythropoiesis or platelet aggregation Hematology recommendations appreciated- likely nutritional or medications Stopped allopurinol, revatio, levaquin Thrombocytopenia Peripheral smear-(1/15/2020) Pancytopenia without dysmyelopoiesis, dyserythropoiesis or platelet aggregation Hematology following, appreciate assistance May be secondary to nutritional deficiency HIT negative Generalized myoclonus Unchanged Appreciate Neurology input Cont Keppra 250mg BID- monitor for dizziness Cont Olanzapine- will ask neurology to see today Head CT negative for acute process EEG suggestive of mild generalized encephalopathic process, possibly related to underlying structural brain injury vs metabolic abnormality Monitor closely Off Digoxin  
  
Anticoagulation for LVAD, INR goal 1.5-2 Goal decreased d/t persistent hematuria No ASA d/t hematuria Coumadin - 1mg tonight Acute Respiratory Failure post op Improved Wean FiO2 as able Pulmonary hygiene - recommend RT use flutter valve with nebs & Incentive spirometer Cont home CPAP- must use while sleeping Repeat PFTs yesterday CKD 3, atrophic left kidney Improved Appreciate Nephrology assistance- signed off Watch Cr, stable Avoid nephrotoxins, trend labs  
  
CAD s/p CABG Off ASA d/t hematuria No BB d/t RV failure Hold statin due to recent hepatic failure LHC performed 11/13 - low likelihood of benefit from revascularization  
  
Hx of VF arrest s/p BiV-ICD No recent shocks Keep K > 4 and Mg > 2  
Mag ox 800 mg po BID- watch for diarrhea Cont potassium to 20meq daily ICD reprogrammed to reduce diaphragmatic stimulation PAF Off Dig due to lethargy and tremors No BB due to RV failure Repeat TFTs WNL 
  
Acute blood loss anemia Improved Likely due to hematuria Cont octreotide 25 mcg SQ TID Fecal occult 12/14 negative, positive on 12/17 Appreciate urology recommendations 
  
Urinary retention, c/b serratia UTI and hematuria Resolved Repeat UA with cx negative 1/13/20 Cystoscopy performed 11/13 shows catheter induced cystitis Pseudomonas aeruginosa positive UA culture (12/31/19) Stopped levaquin and cefepime for tremors Cont Meropenem Severe Acute on Chronic Protein Calorie Malnutrition Prealbumin weekly - 16.9 on 1/20 Appreciate Nutritionist assistance Diet as tolerated, encourage food from home, protein shakes Intolerant of mirtazapine d/t tremors, confusion Cont MVI, thiamine daily Staff to assist with meals Calorie count COPD Appreciate Pulmonology assistance Continue nebs PRN   
PFTs yesterday- will ask pulmonary to optimize nebulizer treatments.  
  
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  
  
Hx of sternal wound infection, sternectomy Sternum closed post op- monitor Delayed skin healing mid portion of sternotomy - evaluated by Dr. Emilia Breaux, will continue IV abx and wet to dry dressings. Will likely require sternal wire after ~3 months from op date Vocal cord paralysis Improved ENT recs appreciated Speech therapy following - appreciate input Anxiety Cont klonopin to 0.25mg QHS prn  
Monitor Depression Cont Effexor to 150 mg PO qAM  
Monitor rhythm strips for prolonged QTc 
  
Debility Continue PT/OT  
OOB at least x3 daily/ all meals Bladder spasms Resolved Received pyridium 100mg x4 doses Oxybutynin 5mg Q6hr prn  
  
Ppx Protonix PT/OT  
+daily BM  
PICC placed 11/22/19 Labs QOD Do Not Disturb at night from 10p-6a 
  
Dispo: 
Remain in [facility] at this time Will need IP rehab. Not ready for discharge at this time LVAD INTERROGATION: 
Device interrogated in person Device function normal, normal flow, PI events LVAD Pump Speed (RPM): 6000 Pump Flow (LPM): 5.2 MAP: 120 PI (Pulsitility Index): 3.9 Power: 5  Test: Yes 
Back Up  at Bedside & Labeled: Yes Power Module Test: Yes Driveline Site Care: No 
Driveline Dressing: Clean, Dry, and Intact Outpatient: No 
 MAP in Therapeutic Range (Outpatient): Yes Testing Alarms Reviewed: Yes 
Back up SC speed: 6000 Back up Low Speed Limit: 5600 Emergency Equipment with Patient?: Yes Emergency procedures reviewed?: Yes Drive line site inspected?: No(covered by dressing) Drive line intergrity inspected?: Yes Drive line dressing changed?: No 
 
PHYSICAL EXAM: 
Visit Vitals BP 91/72 (BP 1 Location: Left arm, BP Patient Position: At rest) Pulse 96 Temp 97.4 °F (36.3 °C) Resp 18 Ht 6' 2\" (1.88 m) Wt 170 lb 13.7 oz (77.5 kg) SpO2 99% BMI 21.94 kg/m² Physical Exam  
Constitutional: He is oriented to person, place, and time. He appears well-developed. He appears cachectic. No distress. Frustrated with tremors HENT:  
Head: Normocephalic. Neck: Normal range of motion. Neck supple. Cardiovascular: Normal rate, regular rhythm and normal heart sounds. + VAD hum Pulmonary/Chest: Effort normal and breath sounds normal. No respiratory distress. Abdominal: Soft. Bowel sounds are normal. He exhibits no distension. Musculoskeletal: Normal range of motion. General: No edema. Comments: tremors Neurological: He is alert and oriented to person, place, and time. He displays tremor. Skin: Skin is warm and dry. Psychiatric: His speech is normal and behavior is normal. His mood appears anxious. ~1 cm x 1 cm x 0.25 cm area of delayed closure on sternotomy. Site clean, no erythema or drainage noted. Subcutaneous tissue noted. Nursing note and vitals reviewed. REVIEW OF SYSTEMS: 
Review of Systems Constitutional: Positive for malaise/fatigue. Negative for chills and fever. Tired HENT: Positive for hearing loss. Negative for nosebleeds. Eyes: Negative. Respiratory: Negative for cough and shortness of breath. Cardiovascular: Negative for chest pain, palpitations, orthopnea and leg swelling. Orthostatic Gastrointestinal: Negative for heartburn, nausea and vomiting. Genitourinary: Negative for dysuria and hematuria. Musculoskeletal: Negative. Neurological: Positive for tremors and weakness. Negative for dizziness and headaches. Mild dizziness - improving Endo/Heme/Allergies: Does not bruise/bleed easily. Psychiatric/Behavioral: The patient is nervous/anxious. PAST MEDICAL HISTORY: 
Past Medical History:  
Diagnosis Date  Degenerative disc disease, lumbar  Heart failure ([facility] [address]  High cholesterol  Hypertension  Paroxysmal atrial fibrillation ([facility] [address] 4/2/2019  Spinal stenosis PAST SURGICAL HISTORY: 
Past Surgical History:  
Procedure Laterality Date  COLONOSCOPY Left 11/11/2019 COLONOSCOPY at bedside performed by Sharla Das MD at [address]  HX CORONARY ARTERY BYPASS GRAFT    
 triple  HX HERNIA REPAIR    
 HX IMPLANTABLE CARDIOVERTER DEFIBRILLATOR  AR CARDIOVERSION ELECTIVE ARRHYTHMIA EXTERNAL N/A 6/10/2019 EP CARDIOVERSION performed by Anton Durham MD at [address] CATH LAB  AR CARDIOVERSION ELECTIVE ARRHYTHMIA EXTERNAL N/A 6/18/2019 EP CARD[facility]ERSION [facility] [address] C[facility]  AR INSJ/RPLCMT PERM DFB W/TRNSVNS LDS 1/DUAL CHMBR N/A 6/21/2019 INSERT ICD BIV MULTI [facility] Cr [facility] [address] [address] CATH LAB  AR TCAT IMPL WRLS P-ART PRS SNR L-T HEMODYN MNTR N/A 9/18/2019 IMPLANT HEART FAILURE MONITORING DEVICE performed by Maikel Lawrence MD at [place] [address] CATH LAB FAMILY HISTORY: 
Family History Problem Relation Age of Onset  Lung Disease Mother  Hypertension Mother Ruel Scott Arthritis-osteo Mother  Heart Disease Mother  Heart Disease Father  Diabetes Father SOCIAL HISTORY: 
Social History Socioeconomic History  Marital status:  Spouse name: Not on file  Number of children: Not on file  Years of education: Not on file  Highest education level: Not on file Tobacco Use  Smoking status: Former Smoker Last attempt to quit: 2010 Years since quittin.1  Smokeless tobacco: Never Used Substance and Sexual Activity  Alcohol use: Not Currently Comment: rarely  Drug use: Never Other Topics Concern LABORATORY RESULTS: 
  
Labs Latest Ref Rng & Units 2020 2020 2020 2020 2020 2020 1/15/2020 WBC 4.1 - 11.1 K/uL 4.2 3.6(L) 3. 3(L) 2. 6(L) 2. 5(L) 2. 4(L) -  
RBC 4.10 - 5.70 M/uL 3.35(L) 3.42(L) 3.32(L) 3.11(L) 3.25(L) 3.22(L) - Hemoglobin 12.1 - 17.0 g/dL 9.8(L) 10. 1(L) 9.8(L) 9.2(L) 9.5(L) 9.5(L) - Hematocrit 36.6 - 50.3 % 33. 0(L) 32. 7(L) 32. 2(L) 29. 5(L) 30. 8(L) 30. 5(L) -  
MCV 80.0 - 99.0 FL 98.5 95.6 97.0 94.9 94.8 94.7 - Platelets 230 - 098 K/uL 109(L) 89(L) 69(L) 68(L) 78(L) 83(L) - Lymphocytes 12 - 49 % - 8(L) 11(L) 19 10(L) - - Monocytes 5 - 13 % - 9 5 12 6 - - Eosinophils 0 - 7 % - 0 2 4 2 - - Basophils 0 - 1 % - 1 0 1 0 - - Bands 0 - 6 % - 0 2 2 3 - - Blasts 0 % - 0 0 0 0 - - Albumin 3.5 - 5.0 g/dL 2. 2(L) 2. 2(L) 2. 1(L) 2. 1(L) 2. 2(L) 2. 0(L) -  
Calcium 8.5 - 10.1 MG/DL 9.0 8.9 8.6 8.4(L) 8.4(L) 8. 1(L) -  
SGOT 15 - 37 U/L 18 21 29 31 38(H) 57(H) -  
Glucose 65 - 100 mg/dL 88 93 105(H) 92 103(H) 114(H) -  
BUN 6 - 20 MG/DL 23(H) 25(H) 25(H) 25(H) 23(H) 23(H) -  
Creatinine 0.70 - 1.30 MG/DL 1.06 1.05 1.00 0.99 0.97 1.02 - Sodium 136 - 145 mmol/L 137 137 134(L) 133(L) 131(L) 132(L) - Potassium 3.5 - 5.1 mmol/L 4.9 4.4 3.9 4.1 3.8 3. 2(L) -  
TSH 0.36 - 3.74 uIU/mL - - - - - - -  
LDH 85 - 241 U/L 204 245(H) 274(H) 251(H) 252(H) 253(H) 224 Some recent data might be hidden Lab Results Component Value Date/Time TSH 2.30 2019 03:29 AM  
 TSH 2.40 10/25/2019 07:39 PM  
 TSH 2.45 2019 04:16 AM  
 
 
ALLERGY: 
No Known Allergies CURRENT MEDICATIONS: 
 Current Facility-Administered Medications Medication Dose Route Frequency  0.9% sodium chloride infusion  75 mL/hr IntraVENous CONTINUOUS  
 warfarin (COUMADIN) tablet 1 mg  1 mg Oral ONCE  
 albumin human 25% (BUMINATE) solution 12.5 g  12.5 g IntraVENous BID  potassium chloride SR (KLOR-CON 10) tablet 20 mEq  20 mEq Oral DAILY  OLANZapine (ZyPREXA) tablet 2.5 mg  2.5 mg Oral QPM  
 sodium chloride (NS) flush 5-40 mL  5-40 mL IntraVENous PRN  
 naloxone (NARCAN) injection 0.4 mg  0.4 mg IntraVENous PRN  
 hydrALAZINE (APRESOLINE) 20 mg/mL injection 20 mg  20 mg IntraVENous Q6H PRN  
 clonazePAM (KlonoPIN) tablet 0.25 mg  0.25 mg Oral QHS PRN  
 epoetin yvonne-epbx (RETACRIT) injection 20,000 Units  20,000 Units SubCUTAneous Q MON, WED & FRI  meropenem (MERREM) 500 mg in 0.9% sodium chloride (MBP/ADV) 50 mL  0.5 g IntraVENous Q6H  
 acetylcysteine (MUCOMYST) 200 mg/mL (20 %) solution 1,200 mg  1,200 mg Nebulization TID RT  
 levETIRAcetam (KEPPRA) tablet 250 mg  250 mg Oral BID  senna-docusate (PERICOLACE) 8.6-50 mg per tablet 1 Tab  1 Tab Oral PRN  
 collagenase (SANTYL) 250 unit/gram ointment   Topical DAILY  fludrocortisone (FLORINEF) tablet 0.1 mg  0.1 mg Oral DAILY  cholecalciferol (VITAMIN D3) (1000 Units /25 mcg) tablet 2 Tab  2,000 Units Oral DAILY  venlafaxine-SR (EFFEXOR-XR) capsule 150 mg  150 mg Oral DAILY WITH BREAKFAST  hydrocortisone (CORTAID) 1 % cream   Topical TID PRN  thiamine HCL (B-1) tablet 100 mg  100 mg Oral DAILY  oxybutynin (DITROPAN) tablet 5 mg  5 mg Oral Q6H PRN  
 magnesium oxide (MAG-OX) tablet 800 mg  800 mg Oral BID  lidocaine (URO-JET/ GLYDO) 2 % jelly   Urethral PRN  
 albuterol-ipratropium (DUO-NEB) 2.5 MG-0.5 MG/3 ML  3 mL Nebulization Q6H PRN  therapeutic multivitamin (THERAGRAN) tablet 1 Tab  1 Tab Oral DAILY  alteplase (CATHFLO) 1 mg in sterile water (preservative free) 1 mL injection  1 mg InterCATHeter PRN  
  finasteride (PROSCAR) tablet 5 mg  5 mg Oral DAILY  diphenhydrAMINE (BENADRYL) capsule 25 mg  25 mg Oral QHS PRN  
 octreotide (SANDOSTATIN) injection 25 mcg  25 mcg SubCUTAneous TID  pantoprazole (PROTONIX) tablet 40 mg  40 mg Oral ACB  arformoterol (BROVANA) neb solution 15 mcg  15 mcg Nebulization BID RT And  
 budesonide (PULMICORT) 500 mcg/2 ml nebulizer suspension  500 mcg Nebulization BID RT  
 lactobac ac& pc-s.therm-b.anim (TIAN Q/RISAQUAD)  1 Cap Oral DAILY  oxyCODONE IR (ROXICODONE) tablet 5 mg  5 mg Oral Q6H PRN  
 tamsulosin (FLOMAX) capsule 0.4 mg  0.4 mg Oral DAILY  Warfarin MD/NP dosing   Other PRN  
 sodium chloride (NS) flush 5-40 mL  5-40 mL IntraVENous Q8H  
 sodium chloride (NS) flush 5-40 mL  5-40 mL IntraVENous PRN  
 acetaminophen (TYLENOL) tablet 650 mg  650 mg Oral Q6H PRN  
 naloxone (NARCAN) injection 0.4 mg  0.4 mg IntraVENous PRN  
 ondansetron (ZOFRAN) injection 4 mg  4 mg IntraVENous Q4H PRN  
 sucralfate (CARAFATE) tablet 1 g  1 g Oral AC&HS  influenza vaccine 2019-20 (6 mos+)(PF) (FLUARIX/FLULAVAL/FLUZONE QUAD) injection 0.5 mL  0.5 mL IntraMUSCular PRIOR TO DISCHARGE Thank you for allowing me to participate in this patient's care. TIERRA Bowden [address], [address] Phone: [phone] Fax: [phone] [facility] ATTENDING ADDENDUM Patient was seen and examined in person. Data and notes were reviewed. I have discussed and agree with the plan as noted in the NP note above without further additions. Isaiah Longoria MD PhD 
Calos Rueda [address] [place] [address]

## 2020-01-23 NOTE — PROGRESS NOTES
Problem: Self Care Deficits Care Plan (Adult) Goal: *Acute Goals and Plan of Care (Insert Text) Description FUNCTIONAL STATUS PRIOR TO ADMISSION: Patient was modified independent using a single point cane for functional mobility. Patient able to shower and dress himself. However, patient required assistance for household management from his wife. HOME SUPPORT: The patient lived alone with wife to provide assistance. Occupational Therapy Goals all updated/continued as follows 1/17/2020 1. Patient will perform upper body dressing including management of LVAD with supervision/setup within 7 day(s) 2. Patient will perform lower body dressing with RW CGA within 7 day(s). -continue goal (MOD A simulated 1/17) 3. Patient will perform grooming standing with RW 2 mins with supervision/setup within 7 day(s). -continue goal (CGA 1/17) 4. Patient will perform toilet transfers with RW supervision/setup using LRAD within 7 day(s). 5.  Patient will perform all aspects of toileting with RW with minimal assistance within 7 day(s). 6.  Patient will perform gathering ADL items high and waist height 2/2 with RW supervision within 7 days. -continue goal (CGA 1/17) Occupational Therapy Goals all updated 1/10/2020 1. Patient will perform upper body dressing including management of LVAD with min A within 7 day(s) 2. Patient will perform lower body dressing with RW CGA within 7 day(s). 3.  Patient will perform grooming standing with RW 2 mins with supervision/setup within 7 day(s). 4.  Patient will perform toilet transfers with RW minimum assistance within 7 day(s). 5.  Patient will perform all aspects of toileting with RW with moderate assistance within 7 day(s). 6.  Patient will perform gathering ADL items high and waist height 2/2 with RW supervision within 7 days. Continue all goals 10/30/19 post re-eval for Impella removal  
Initiated 10/28/2019 1.  Patient will perform bathing with supervision/set-up within 7 day(s). 2.  Patient will perform lower body dressing with supervision/set-up within 7 day(s). 3.  Patient will perform grooming with modified independence within 7 day(s). 4.  Patient will perform toilet transfers with modified independence within 7 day(s). 5.  Patient will perform all aspects of toileting with supervision/set-up within 7 day(s). 6.  Patient will participate in upper extremity therapeutic exercise/activities with supervision/set-up for 5 minutes within 7 day(s). 7.  Patient will utilize energy conservation techniques during functional activities with verbal cues within 7 day(s). Outcome: Progressing Towards Goal 
 OCCUPATIONAL THERAPY TREATMENT Patient: Corby David (75 y.o. male) Date: 1/23/2020 Diagnosis: Acute decompensated heart failure ([facility] [address] [I50.9] <principal problem not specified> Procedure(s) (LRB): 
RIGHT HEART CATH (N/A) 2 Days Post-Op Precautions: Fall Chart, occupational therapy assessment, plan of care, and goals were reviewed. ASSESSMENT Patient continues with skilled OT services and is progressing towards goals. Patient continues to be limited by orthostatic hypotension, however is making strides towards progressing tolerance with positional changes (MAP 80 EOB; MAP 60 following bed > chair transfer; and MAP 74 with 3 minutes in chair) and patient with c/o dizzy however able to tolerate sitting. Patient continues to require MONI-MOD A for LVAD switchovers however is increasing verbalization of terminology accurately. Continue to recommend IPR when patient medically stable for discharge. Patient is verbalizing and is demonstrating understanding of mindful-based movements (\"move in the tube\") principles of keeping UEs proximal to ribcage to prevent lateral pull on the sternum during load-bearing activities with verbal cues required for compliance. Current Level of Function Impacting Discharge (ADLs): MOD A UB ADLs; MOD A LB ADLs due to dynamic standing balance for pulling pants up/over hips Other factors to consider for discharge: LVAD HM III 
    
 
PLAN : 
Patient continues to benefit from skilled intervention to address the above impairments. Continue treatment per established plan of care. to address goals. Recommend with staff: OOB x 3 to chair; LVAD switchovers x 3; BUE cardiac exercises Recommend next OT session: standing grooming task Recommendation for discharge: (in order for the patient to meet his/her long term goals) Therapy 3 hours per day 5-7 days per week This discharge recommendation: 
Has been made in collaboration with the attending provider and/or case management IF patient discharges home will need the following DME: TBD SUBJECTIVE:  
Patient stated I just feel awful (patient stated when standing).  OBJECTIVE DATA SUMMARY:  
Cognitive/Behavioral Status: 
Neurologic State: Alert Orientation Level: Oriented X4 Cognition: Appropriate for age attention/concentration Perception: Appears intact Perseveration: No perseveration noted Safety/Judgement: Decreased insight into deficits; Decreased awareness of need for safety;Decreased awareness of need for assistance Functional Mobility and Transfers for ADLs: 
Bed Mobility: 
Supine to Sit: Contact guard assistance Transfers: 
Sit to Stand: Minimum assistance;Assist x1;Additional time; Adaptive equipment Bed to Chair: Moderate assistance;Assist x1;Additional time; Adaptive equipment Balance: 
Sitting: Impaired; Without support Sitting - Static: Fair (occasional) Sitting - Dynamic: Fair (occasional); Poor (constant support) Standing: Impaired; With support Standing - Static: Fair;Constant support Standing - Dynamic : Fair;Poor;Constant support ADL Intervention: 
  
Patient completed LVAD switchover seated EOB x MOD A with A for unscrewing power leads, donning vest, and securing sytem controller. Patient able to take 5 steps using walker to chair prior to seated rest break and BUE ex seated. Upper Body Dressing Assistance Dressing Assistance: Moderate assistance Lower Body Dressing Assistance Socks: Supervision;Set-up(upright in bed) Cognitive Retraining Safety/Judgement: Decreased insight into deficits; Decreased awareness of need for safety;Decreased awareness of need for assistance Therapeutic Exercises:  
Patient instructed on the benefits and demonstrated cardiac exercises while seated with Stand-by assistance. Instructed and indicated understanding on how to progress reps, sets against gravity, pacing through progressive muscle strengthening standing based on surgeon clearance for more weight in prep for basic and instrumental ADLs. Instruction on the use of household items in place of weights as needed. CARDIAC EXERCISE Sets Reps Active  Active Assist   
Passive  Self ROM Comments Shoulder flexion  1  5   [x]                            []                             []                             []                               
Shoulder abduction  1  5  [x]                             []                             []                             []                               
Scapular elevation  1  5  [x]                             []                              []                             []                               
Scapular retraction  1  5  [x]                             []                             []                             []                               
Trunk rotation  1  5  [x]                             []                             []                             []                               
Trunk sidebending  1  5  [x]                             []                              []                             []                                     
 
 
 Activity Tolerance:  
Fair, requires rest breaks, and signs and symptoms of orthostatic hypotension Please refer to the flowsheet for vital signs taken during this treatment. After treatment patient left in no apparent distress:  
Sitting in chair and Call bell within reach COMMUNICATION/COLLABORATION:  
The patients plan of care was discussed with: Physical Therapist and Registered Nurse Aniyah Savage Time Calculation: 38 mins

## 2020-01-23 NOTE — PROGRESS NOTES
1940: Report received from [address] 
 
0800: Bedside and Verbal shift change report given to Toño Kaur RN (oncoming nurse) by Winnie Jaime RN (offgoing nurse).  Report included the following information SBAR, Kardex, ED Summary, Intake/Output, MAR, Recent Results, Med Rec Status and Cardiac Rhythm SR.

## 2020-01-23 NOTE — PROGRESS NOTES
Problem: Falls - Risk of 
Goal: *Absence of Falls Description Document Wally Trujillo Fall Risk and appropriate interventions in the flowsheet. Outcome: Progressing Towards Goal 
Note: Fall Risk Interventions: 
Mobility Interventions: Communicate number of staff needed for ambulation/transfer, Mechanical lift, Patient to call before getting OOB, OT consult for ADLs, PT Consult for mobility concerns, PT Consult for assist device competence Mentation Interventions: Adequate sleep, hydration, pain control, Evaluate medications/consider consulting pharmacy Medication Interventions: Assess postural VS orthostatic hypotension Elimination Interventions: Call light in reach, Patient to call for help with toileting needs, Stay With Me (per policy), Urinal in reach History of Falls Interventions: Consult care management for discharge planning, Door open when patient unattended, Evaluate medications/consider consulting pharmacy Problem: Pain Goal: *Control of Pain Outcome: Progressing Towards Goal 
  
Problem: Pressure Injury - Risk of 
Goal: *Prevention of pressure injury Description Document Mich Scale and appropriate interventions in the flowsheet. Offload heels Turn approximately every 2 hours Outcome: Progressing Towards Goal 
Note: Pressure Injury Interventions: 
Sensory Interventions: Assess changes in LOC, Check visual cues for pain, Discuss PT/OT consult with provider, Keep linens dry and wrinkle-free, Minimize linen layers, Monitor skin under medical devices Moisture Interventions: Absorbent underpads, Minimize layers Activity Interventions: PT/OT evaluation, Pressure redistribution bed/mattress(bed type) Mobility Interventions: Pressure redistribution bed/mattress (bed type), HOB 30 degrees or less, PT/OT evaluation Nutrition Interventions: Document food/fluid/supplement intake Friction and Shear Interventions: Apply protective barrier, creams and emollients, Feet elevated on foot rest, Foam dressings/transparent film/skin sealants, HOB 30 degrees or less, Lift sheet, Transferring/repositioning devices Problem: Heart Failure: Discharge Outcomes Goal: *Demonstrates ability to perform prescribed activity without shortness of breath or discomfort Outcome: Not Progressing Towards Goal 
Goal: *Left ventricular function assessment completed prior to or during stay, or planned for post-discharge Outcome: Progressing Towards Goal 
Goal: *Verbalizes understanding and describes prescribed diet Outcome: Progressing Towards Goal 
Goal: *Verbalizes understanding/describes prescribed medications Outcome: Progressing Towards Goal 
  
Problem: LVAD Post-op Day 15 to Discharge Goal: Activity/Safety Outcome: Progressing Towards Goal 
Goal: Respiratory Outcome: Not Progressing Towards Goal

## 2020-01-23 NOTE — PROGRESS NOTES
0730: Bedside shift change report given to Latasha WILKES (oncoming nurse) by Gio Odell RN (offgoing nurse). Report included the following information SBAR, Kardex, Procedure Summary, Intake/Output, MAR, and Recent Results. 1030: Changed sternal wound dressing with santyl and packing. Small amount of yellow drainage noted. Granulation tissue at the site. 1200: Pt up in chair. MAP 72. Pt reports feeling \"okay, not great. \" Pt tolerated chair x45 minutes. Returned to bed with 2 assist and gait belt. 1730: Pt up in chair. MAP 68. Pt reports feeling \"okay\" Pt remained in chair for 45 minutes. 1930: Bedside shift change report given to Keeley Garrison (oncoming nurse) by Rod Perdomo RN (offgoing nurse). Report included the following information SBAR, Kardex, Procedure Summary, Intake/Output, MAR and Recent Results. Problem: Falls - Risk of 
Goal: *Absence of Falls Description Document Redgie Curet Fall Risk and appropriate interventions in the flowsheet. Outcome: Progressing Towards Goal 
Note: Fall Risk Interventions: 
Mobility Interventions: Patient to call before getting OOB, PT Consult for mobility concerns, PT Consult for assist device competence Mentation Interventions: Adequate sleep, hydration, pain control, Evaluate medications/consider consulting pharmacy Medication Interventions: Assess postural VS orthostatic hypotension, Teach patient to arise slowly, Patient to call before getting OOB Elimination Interventions: Call light in reach, Toilet paper/wipes in reach, Urinal in reach History of Falls Interventions: Consult care management for discharge planning, Door open when patient unattended, Evaluate medications/consider consulting pharmacy Problem: Patient Education: Go to Patient Education Activity Goal: Patient/Family Education Outcome: Progressing Towards Goal 
  
Problem: Pressure Injury - Risk of 
Goal: *Prevention of pressure injury Description Document Mich Scale and appropriate interventions in the flowsheet. Offload heels Turn approximately every 2 hours Outcome: Progressing Towards Goal 
Note: Pressure Injury Interventions: 
Sensory Interventions: Assess changes in LOC, Check visual cues for pain, Discuss PT/OT consult with provider, Keep linens dry and wrinkle-free, Minimize linen layers, Monitor skin under medical devices Moisture Interventions: Absorbent underpads, Minimize layers Activity Interventions: Increase time out of bed, Pressure redistribution bed/mattress(bed type), PT/OT evaluation, Assess need for specialty bed Mobility Interventions: HOB 30 degrees or less, Pressure redistribution bed/mattress (bed type), PT/OT evaluation Nutrition Interventions: Document food/fluid/supplement intake, Discuss nutritional consult with provider Friction and Shear Interventions: Apply protective barrier, creams and emollients, Feet elevated on foot rest, Foam dressings/transparent film/skin sealants, HOB 30 degrees or less, Lift sheet, Transferring/repositioning devices Problem: Patient Education: Go to Patient Education Activity Goal: Patient/Family Education Outcome: Progressing Towards Goal 
  
Problem: Diabetes Self-Management Goal: *Using medications safely Description State effect of diabetes medications on diabetes; name diabetes medication taking, action and side effects.  
Outcome: Progressing Towards Goal

## 2020-01-23 NOTE — PROGRESS NOTES
Problem: Mobility Impaired (Adult and Pediatric) Goal: *Acute Goals and Plan of Care (Insert Text) Description FUNCTIONAL STATUS PRIOR TO ADMISSION: Patient was modified independent using a single point cane for functional mobility. Patient reports an increasingly sedentary lifestyle 2* fatigue and SOB/dyspnea. Retired (so is his wife). Patient reports x 3 falls within the last couple of weeks. Patient is wearing either nasal cannula or CPAP at night. LVAD work-up has been initiated. HOME SUPPORT PRIOR TO ADMISSION: The patient lived with his wife, but did not require physical assistance. Physical Therapy Goals: 
 
Reassessment completed 1/23/2020 and goals upgraded as appropriate. 1.  Patient will move from supine to sit and sit to supine, scoot up and down and roll side to side in bed with supervision within 7 days. 2.  Patient will perform sit to/from stand with contact guard assistance x 1 within 7 days. 3.  Patient will ambulate 50 feet, twice, with least restrictive assistive device and moderate assistance within 7 days. 4.  Stair goal to be formulated when appropriate. 5.  Patient will perform cardiac exercises per protocol with independence and verbal cuing only for performance remembrance BID within 7 days. 6.  Patient will verbally and functionally recall mindful movement principles (Move in the Tube) with minimal verbal cuing/reminding within 7 days. 7.  Patient will perform power exchange for power module to/from battery with verbal cuing within 7 days. Reassessed 1/17/2020 and goals upgraded as approporiate Goals upgraded as appropriate 1/07/2020- Goals appropriate 1/9/2020 on weekly reassessment 1. Patient will move from supine to sit and sit to supine, scoot up and down and roll side to side in bed with supervision within 7 days. 2.  Patient will perform sit to/from stand with minimal assistance x 1 within 7 days. - Upgrade to contact guard assist 1/17/2020 3.  Patient will ambulate 50 feet with least restrictive assistive device and moderate assistance within 7 days. 4.  Stair goal to be formulated when appropriate. 5.  Patient will perform cardiac exercises per protocol with supervision within 7 days. 6.  Patient will verbally and functionally recall mindful movement principles (Move in the Tube) with minimal verbal cuing/reminding within 7 days. 7.  Patient will perform power exchange for power module to/from battery with minimal assistance within 7 days. - Upgrade to verbal cuing only 1/17/2020 Reassessed on 1/2/2020 and goals not met, carry-over. Reassessed on 12/26/2019, goals not met but remain appropriate, so carry over Weekly reassessment completed 12/19/2019 and goals downgraded as appropriate. 1.  Patient will move from supine to sit and sit to supine, scoot up and down and roll side to side in bed with contact guard assistance within 7 days. 2.  Patient will perform sit to/from stand with minimal assistance x 1 within 7 days. 3.  Patient will ambulate 25 feet with least restrictive assistive device and moderate assistance within 7 days. 4.  Stair goal to be formulated when appropriate. 5.  Patient will perform cardiac exercises per protocol with supervision within 7 days. 6.  Patient will verbally and functionally recall mindful movement principles (Move in the Tube) with minimal verbal cuing/reminding within 7 days. 7.  Patient will perform power exchange for power module to/from battery with moderate assistance within 7 days. Re-evaluation completed 11/20/2019 and new goals formulated following LVAD implantation. Reviewed and cont 12/3/2019. Reviewed and continued 12/12/2019 1. Patient will move from supine to sit and sit to supine, scoot up and down and roll side to side in bed with minimal assistance/contact guard assist within 7 days.    
2.  Patient will perform sit to/from stand with minimal assistance/contact guard assist within 7 days. 3.  Patient will ambulate 150 feet with least restrictive assistive device and minimal assistance/contact guard assist within 7 days. 4.  Patient will ascend/descend 4 stairs with handrail(s) with minimal assistance/contact guard assist within 7 days. 5.  Patient will perform cardiac exercises per protocol with supervision within 7 days. 6.  Patient will verbally and functionally recall 3/3 sternal precautions within 7 days. 7.  Patient will perform power exchange for power module to/from battery with moderate assistance  within 7 days. Initiated 10/27/2019; updated 11/15/2019 1. Patient will move from supine to sit and sit to supine, scoot up and down and roll side to side in bed with independence within 7 days. 2.  Patient will perform sit to/from stand with supervision/set-up within 7 days. 3.  Patient will ambulate 200 feet with least restrictive assistive device and supervision/set-up within 7 days. 4.  Patient will ascend/descend 4 stairs with  handrail(s) with supervision/set-up within 7 days for functional strengthening and community reintegration. Pablo Junk 5.  Patient will verbally and functionally recall 3/3 sternal precautions within 7 days in preparation for LVAD implantation. 6.  Patient will perform a mock power exchange for power module to/from battery with supervision/set-up within 7 days in preparation for LVAD implantation. 1.  Patient will move from supine to sit and sit to supine, scoot up and down and roll side to side in bed with independence within 7 days. 2.  Patient will perform sit to/from stand with supervision/set-up within 7 days. 3.  Patient will ambulate 150 feet with least restrictive assistive device and supervision/set-up within 7 days. 4.  Patient will ascend/descend 4 stairs with  handrail(s) with supervision/set-up within 7 days for functional strengthening and community reintegration. Pablo Junk 5.  Patient will verbally and functionally recall 3/3 sternal precautions within 7 days in preparation for LVAD implantation. 6.  Patient will perform a mock power exchange for power module to/from battery with supervision/set-up within 7 days in preparation for LVAD implantation. Outcome: Progressing Towards Goal 
  
PHYSICAL THERAPY TREATMENT: WEEKLY REASSESSMENT Patient: Tammie Vidal (75 y.o. male) Date: 1/23/2020 Primary Diagnosis: Acute decompensated heart failure ([facility] [address] [I50.9] Procedure(s) (LRB): 
RIGHT HEART CATH (N/A) 2 Days Post-Op Precautions:  Fall, LVAD (Move in the Tube) ASSESSMENT Patient continues with skilled PT services and is progressing towards goals. Allowed additional time for all functional tasks. Patient continues to be functionally limited by the following: choreiform/dystonic tremors (increased in UEs with activity/actions), fluctuating MAPs (symptomatic), generalized debility, dyspnea, and fatigue. Patient requiring set-up cues, safety cues, external pacing strategies, and instruction for pursed-lip breathing. Patient requiring overall moderate assistance plus rolling walker support for short-distance ambulation. MAP 80 sitting at edge of bed, decreased to 60 following short-distance ambulation, and increased to 74 following seated rest break x 3 minutes. Patient continues to require up to minimal assistance and moderate verbal cues for LVAD switch-overs. Patient's progression toward goals since last assessment: Mod A + constant walker support for short-distance ambulation, LE instability/ataxia, dyspnea (2 LPM) Functional Outcome Measure: The patient scored 7/28 on the Tinetti outcome measure which is indicative of a high fall risk. Other factors to consider for discharge: Complex hospital admission (90 days) PLAN : 
Goals have been updated based on progression since last assessment. Patient continues to benefit from skilled intervention to address the above impairments. Recommendations and Planned Interventions: bed mobility training, transfer training, gait training, therapeutic exercises, neuromuscular re-education, edema management/control, patient and family training/education, and therapeutic activities Frequency/Duration: Patient will be followed by physical therapy:  5 times a week to address goals. Recommendation for discharge: (in order for the patient to meet his/her long term goals) Therapy 3 hours per day 5-7 days per week This discharge recommendation: 
Has been made in collaboration with the attending provider and/or case management IF patient discharges home will need the following DME: to be determined (TBD) SUBJECTIVE:  
Patient stated I'm feeling less dizzy now.  OBJECTIVE DATA SUMMARY:  
HISTORY:   
Past Medical History:  
Diagnosis Date Degenerative disc disease, lumbar Heart failure ([facility] [address] High cholesterol Hypertension Paroxysmal atrial fibrillation ([facility] [address] 4/2/2019 Spinal stenosis Past Surgical History:  
Procedure Laterality Date COLONOSCOPY Left 11/11/2019 COLONOSCOPY at bedside performed by Tonie Du MD at [address] [place] [address] Av HX CORONARY ARTERY BYPASS GRAFT    
 triple HX HERNIA REPAIR    
 HX IMPLANTABLE CARDIOVERTER DEFIBRILLATOR    
 [place] CARDIOVERSION ELECTIVE ARRHYTHMIA EXTERNAL N/A 6/10/2019 EP CARDIOVERSION performed by Jhonatan Cartwright MD at [place] [address], [facility] CATH LAB  
 [place] CARDIOVERSION ELECTIVE ARRHYTHMIA EXTERNAL N/A 6/18/2019 EP CARDIOVERSION performed by Derwin Schwab, MD at [place] [address], [facility] [address] CATH LAB  
 [place] INSJ/RPLCMT PERM DFB W/TRNSVNS LDS 1/DUAL [facility] N/A 6/21/2019 INSERT ICD BIV MULTI performed by Meena Briggs MD at [place] [address], [facility] CATH LAB  
 [place] TCAT IMPL WRLS P-ART PRS SNR L-T HEMODYN MNTR N/A 9/18/2019 IMPLANT HEART FAILURE MONITORING DEVICE [facility] [facility] LAB Personal factors and/or comorbidities impacting plan of care: PMH Home Situation Home Environment: Private residence # Steps to Enter: 0 One/Two Story Residence: One story Living Alone: No 
Support Systems: Spouse/Significant Other/Partner Patient Expects to be Discharged to[de-identified] Unknown Current DME Used/Available at Home: Cane, straight, Walker, rolling, CPAP Tub or Shower Type: Shower EXAMINATION/PRESENTATION/DECISION MAKING:  
Critical Behavior: 
Neurologic State: Alert Orientation Level: Oriented X4 Cognition: Appropriate for age attention/concentration Safety/Judgement: Decreased insight into deficits, Decreased awareness of need for safety, Decreased awareness of need for assistance Hearing: Auditory Auditory Impairment: None Functional Mobility: 
Bed Mobility: 
  
Supine to Sit: Contact guard assistance Scooting: Contact guard assistance Transfers: 
Sit to Stand: Minimum assistance;Assist x1;Additional time; Adaptive equipment Stand to Sit: Minimum assistance; Additional time;Assist x1;Adaptive equipment Bed to Chair: Moderate assistance;Assist x1;Additional time; Adaptive equipment Balance:  
Sitting: Impaired; Without support Sitting - Static: Fair (occasional) Sitting - Dynamic: Fair (occasional); Poor (constant support) Standing: Impaired; With support Standing - Static: Fair;Constant support Standing - Dynamic : Fair;Poor;Constant support Ambulation/Gait Training: 
Distance (ft): 10 Feet (ft) Assistive Device: Gait belt(+ 2 LPM NC) Ambulation - Level of Assistance: Assist x1; Moderate assistance; Adaptive equipment Gait Abnormalities: Ataxic;Path deviations(Increased UE action tremors) Base of Support: Center of gravity altered Functional Measure: 
Tinetti test: 
 
Sitting Balance: 1 Arises: 0 Attempts to Rise: 0 Immediate Standing Balance: 0 Standing Balance: 0 Nudged: 0 Eyes Closed: 0 Turn 360 Degrees - Continuous/Discontinuous: 0 Turn 360 Degrees - Steady/Unsteady: 0 Sitting Down: 1 Balance Score: 2 Balance total score Indication of Gait: 1 
R Step Length/Height: 1 L Step Length/Height: 1 
R Foot Clearance: 0 
L Foot Clearance: 0 Step Symmetry: 1 Step Continuity: 0 Path: 1 Trunk: 0 Walking Time: 0 Gait Score: 5 Gait total score Total Score: 7/28 Overall total score Tinetti Tool Score Risk of Falls 
<19 = High Fall Risk 19-24 = Moderate Fall Risk 25-28 = Low Fall Risk Tinetti ME. Performance-Oriented Assessment of Mobility Problems in Elderly Patients. [place]man 66; [id number]. (Scoring Description: PT Bulletin Feb. 10, 1993) Older adults: Luisa Medrano et al, 2009; n = [address] [facility] elderly evaluated with ABC, XAVIER, ADL, and IADL) · Mean XAVIER score for males aged 69-68 years = 26.21(3.40) · Mean XAVIER score for females age 69-68 years = 25.16(4.30) · Mean XAVIER score for males over 80 years = 23.29(6.02) · Mean XAVIER score for females over 80 years = 17.20(8.32) Pain Rating: 
+ Dyspnea Activity Tolerance:  
Fair Please refer to the flowsheet for vital signs taken during this treatment. After treatment patient left in no apparent distress:  
Sitting in chair and Call bell within reach COMMUNICATION/EDUCATION:  
The patients plan of care was discussed with: Occupational Therapist, Registered Nurse, Physician, and . Fall prevention education was provided and the patient/caregiver indicated understanding., Patient/family have participated as able in goal setting and plan of care. , and Patient/family agree to work toward stated goals and plan of care. Thank you for this referral. 
Cassandra Horan, PT, DPT Time Calculation: 43 mins

## 2020-01-23 NOTE — PROGRESS NOTES
[facility] VAD/Heart Failure Progress Note Admit Date: 10/25/2019 POD:  2 Days Post-Op Procedure:  Procedure(s): RIGHT HEART CATH Subjective: Dyspnea, fatigue, and weakness; working on tremors Objective:  
Vitals: 
Blood pressure 91/72, pulse 91, temperature 97.6 °F (36.4 °C), resp. rate 20, height 6' 2\" (1.88 m), weight 170 lb 13.7 oz (77.5 kg), SpO2 100 %. Temp (24hrs), Av.7 °F (36.5 °C), Min:97.4 °F (36.3 °C), Max:97.8 °F (36.6 °C) Hemodynamics: 
 CO: CO (l/min): 5.8 l/min CI: CI (l/min/m2): 2.8 l/min/m2 CVP: CVP (mmHg): 4 mmHg (19 1645) SVR: SVR (dyne*sec)/cm5: 1080 (dyne*sec)/cm5 (19 1200) PAP Systolic: PAP Systolic: 34 ( 9413) PAP Diastolic: PAP Diastolic: 13 (17/48/10 6369) PVR:   
 SV02: SVO2 (%): 67 % (19 1300) SCV02: SCVO2 (%): 75 % (10/29/19 1900) VAD Interrogation: LVAD (Heartmate) Pump Speed (RPM): 6000 Pump Flow (LPM): 5 Chatter in Lines: No 
PI (Pulsitility Index): 4.1 Power: 4.8 MAP: 72  Test: No 
Back Up  at Bedside & Labeled: Yes Power Module Test: No 
Driveline Site Care: No 
Driveline Dressing: Clean, Dry, and Intact EKG/Rhythm:   
 
Extubation Date / Time:  
 
CT Output:  
 
Ventilator: 
Ventilator Volumes Vt Set (ml): 550 ml (19 08) Vt Exhaled (Machine Breath) (ml): 639 ml (19 08) Vt Spont (ml): 437 ml (19 1035) Ve Observed (l/min): 7.25 l/min (19 1035) Oxygen Therapy: 
Oxygen Therapy O2 Sat (%): 100 % (20 1133) Pulse via Oximetry: 77 beats per minute (20 1013) O2 Device: Nasal cannula (20 1133) PEEP/CPAP (cm H2O): 2 cm H20 (20 1555) O2 Flow Rate (L/min): 2 l/min (20 1133) FIO2 (%): 21 % (20 0905) CXR: 
 
Admission Weight: Last Weight Weight: 192 lb 10.9 oz (87.4 kg) Weight: 170 lb 13.7 oz (77.5 kg) Intake / Output / Drain: 
Current Shift:  0701 -  190 In: 240 [P.O.:240] Out: 900 [Urine:900] Last 24 hrs.:  
 
Intake/Output Summary (Last 24 hours) at 1/23/2020 1421 Last data filed at 1/23/2020 1140 Gross per 24 hour Intake 1695 ml Output 2050 ml Net -355 ml No results for input(s): CPK, CKMB, TROIQ in the last 72 hours. Recent Labs  
  01/22/20 
0413   
K 4.9  
CO2 29 BUN 23* CREA 1.06  
GLU 88  
MG 2.1 WBC 4.2 HGB 9.8* HCT 33.0*  
* Recent Labs  
  01/22/20 
0413 INR 2.6* PTP 24.7* No lab exists for component: PBNP Current Facility-Administered Medications:  
  0.9% sodium chloride infusion, 75 mL/hr, IntraVENous, CONTINUOUS, Levi, Shana B, NP 
  warfarin (COUMADIN) tablet 1 mg, 1 mg, Oral, ONCE, Levi, Shana B, NP 
  albumin human 25% (BUMINATE) solution 12.5 g, 12.5 g, IntraVENous, BID, Levi, Shana B, NP, 12.5 g at 01/23/20 1140   potassium chloride SR (KLOR-CON 10) tablet 20 mEq, 20 mEq, Oral, DAILY, Levi, Shana B, NP, 20 mEq at 01/23/20 8468   OLANZapine (ZyPREXA) tablet 2.5 mg, 2.5 mg, Oral, QPM, Levi, Shana B, NP, 2.5 mg at 01/22/20 1733   sodium chloride (NS) flush 5-40 mL, 5-40 mL, IntraVENous, PRN, Candie Cook MD 
  naloxone [facility]) injection 0.4 mg, 0.4 mg, IntraVENous, PRN, Candie Cook MD 
  hydrALAZINE (APRESOLINE) 20 mg/mL injection 20 mg, 20 mg, IntraVENous, Q6H PRN, Levi, Shana B, NP, 20 mg at 01/23/20 8243   clonazePAM (KlonoPIN) tablet 0.25 mg, 0.25 mg, Oral, QHS PRN, Levi, Shana B, NP 
  epoetin yvonne-epbx (RETACRIT) injection 20,000 Units, 20,000 Units, SubCUTAneous, Q MON, WED & FRI, Shana Sims, NP, 20,000 Units at 01/23/20 5000   meropenem (MERREM) 500 mg in 0.9% sodium chloride (MBP/ADV) 50 mL, 0.5 g, IntraVENous, Q6H, Juan C Olivares MD, Last Rate: 100 mL/hr at 01/23/20 0530, 500 mg at 01/23/20 0530   acetylcysteine (MUCOMYST) 200 mg/mL (20 %) solution 1,200 mg, 1,200 mg, Nebulization, TID RT, Mickey Smith MD, 1,200 mg at 01/23/20 1012   levETIRAcetam (KEPPRA) tablet 250 mg, 250 mg, Oral, BID, Javed Beck MD, 250 mg at 01/23/20 0839 
  senna-docusate (PERICOLACE) 8.6-50 mg per tablet 1 Tab, 1 Tab, Oral, PRN, Soledad Sosa MD, 1 Tab at 01/18/20 3007   collagenase (SANTYL) 250 unit/gram ointment, , Topical, DAILY, Levi, Shana B, NP 
  fludrocortisone (FLORINEF) tablet 0.1 mg, 0.1 mg, Oral, DAILY, Levi, Shana B, NP, 0.1 mg at 01/23/20 4591   cholecalciferol (VITAMIN D3) (1000 Units /25 mcg) tablet 2 Tab, 2,000 Units, Oral, DAILY, Polliard, Ira Romeo, NP, 2 Tab at 01/23/20 0839 
  venlafaxine-SR (EFFEXOR-XR) capsule 150 mg, 150 mg, Oral, DAILY WITH BREAKFAST, Polliard, Ira Romeo, NP, 150 mg at 01/23/20 5132   hydrocortisone (CORTAID) 1 % cream, , Topical, TID PRN, Mounika Altman MD 
  thiamine HCL (B-1) tablet 100 mg, 100 mg, Oral, DAILY, Musa Johnnie E, NP, 100 mg at 01/23/20 0839 
  oxybutynin (DITROPAN) tablet 5 mg, 5 mg, Oral, Q6H PRN, Levi, Shnaa B, NP, 5 mg at 01/01/20 1204   magnesium oxide (MAG-OX) tablet 800 mg, 800 mg, Oral, BID, Msua Johnnie, NP, 800 mg at 01/23/20 6773   lidocaine (URO-JET/ GLYDO) 2 % jelly, , Urethral, PRN, Mounika Altman MD 
  albuterol-ipratropium (DUO-NEB) 2.5 MG-0.5 MG/3 ML, 3 mL, Nebulization, Q6H PRN, Negritailie Bougie Rojelio Bernheim, MD, 3 mL at 01/21/20 1555   therapeutic multivitamin (THERAGRAN) tablet 1 Tab, 1 Tab, Oral, DAILY, Shana Sims NP, 1 Tab at 01/23/20 0839 
  alteplase (CATHFLO) 1 mg in sterile water (preservative free) 1 mL injection, 1 mg, InterCATHeter, PRN, Mounika Finney MD, 1 mg at 01/20/20 1156   finasteride (PROSCAR) tablet 5 mg, 5 mg, Oral, DAILY, Cliff Head MD, 5 mg at 01/23/20 0538   diphenhydrAMINE (BENADRYL) capsule 25 mg, 25 mg, Oral, QHS PRN, Polliard, Hamp Pallas T, NP, 25 mg at 01/19/20 0044   octreotide (SANDOSTATIN) injection 25 mcg, 25 mcg, SubCUTAneous, TID, Kvng Herrera T, NP, 25 mcg at 01/23/20 0846 
  pantoprazole (PROTONIX) tablet 40 mg, 40 mg, Oral, ACB, Kvng Herrera T, NP, 40 mg at 01/23/20 2647   arformoterol (BROVANA) neb solution 15 mcg, 15 mcg, Nebulization, BID RT, 15 mcg at 01/23/20 1012 **AND** budesonide (PULMICORT) 500 mcg/2 ml nebulizer suspension, 500 mcg, Nebulization, BID RT, Sharon, Kvng Gordillo T, NP, 500 mcg at 01/23/20 1012   lactobac ac& pc-s.therm-b.anim (TIAN Q/RISAQUAD), 1 Cap, Oral, DAILY, Chon Lopez MD, 1 Cap at 01/23/20 7136 
  oxyCODONE IR (ROXICODONE) tablet 5 mg, 5 mg, Oral, Q6H PRN, Samanta Meredith MD, 5 mg at 12/30/19 0405   tamsulosin (FLOMAX) capsule 0.4 mg, 0.4 mg, Oral, DAILY, Logan Kincaid MD, 0.4 mg at 01/23/20 4223   Warfarin MD/NP dosing, , Other, PRN, Mounika Altman MD 
  sodium chloride (NS) flush 5-40 mL, 5-40 mL, IntraVENous, Q8H, Samanta Meredith MD, 10 mL at 01/22/20 3539   sodium chloride (NS) flush 5-40 mL, 5-40 mL, IntraVENous, PRN, Samanta Meredith MD, 10 mL at 01/20/20 1326   acetaminophen (TYLENOL) tablet 650 mg, 650 mg, Oral, Q6H PRN, Samanta Meredith MD, 650 mg at 01/21/20 1512 
  naloxone [facility]) injection 0.4 mg, 0.4 mg, IntraVENous, PRN, Samanta Meredith MD 
  ondansetron [facility]) injection 4 mg, 4 mg, IntraVENous, Q4H PRN, Samanta Meredith MD, 4 mg at 01/20/20 1318   sucralfate (CARAFATE) tablet 1 g, 1 g, Oral, AC&HS, Samanta Meredith MD, 1 g at 01/23/20 1148   influenza vaccine 2019-20 (6 mos+)(PF) (FLUARIX/FLULAVAL/FLUZONE QUAD) injection 0.5 mL, 0.5 mL, IntraMUSCular, PRIOR TO DISCHARGE, Mounika Altman MD 
 
A/P 
  
S/P LVAD - good flows Need for anti-coagulation - coumadin DM - insulin RV dysfunction - milrinone, diuretics  
  
Risk of morbidity and mortality - high Medical decision making - high complexity Signed By: Rupinder Benitez MD

## 2020-01-24 NOTE — PROGRESS NOTES
ID Progress Note 2020 Subjective: He states that he is feeling okay Objective: Antibiotics: 1. Levaquin 2. Cefepime 3. Rifampin 4. Fluconazole 5. Vancomycin 6. Cefazolin 7. Zosyn 8. Jagruti Service Vitals:  
Visit Vitals BP 91/72 (BP 1 Location: Left arm, BP Patient Position: At rest) Pulse (!) 107 Temp 97.8 °F (36.6 °C) Resp 16 Ht 6' 2\" (1.88 m) Wt 77.9 kg (171 lb 11.8 oz) SpO2 97% BMI 22.05 kg/m² Tmax:  Temp (24hrs), Av.7 °F (36.5 °C), Min:97.3 °F (36.3 °C), Max:98 °F (36.7 °C) Exam:  Lungs: A few basilar rales Heart:  regular rate and rhythm Abdomen:  soft, non-tender. Bowel sounds normal. No masses,  no organomegaly Urine in neal is grossly clear Sternal wound is dressed and dry Labs:     
Recent Labs  
  20 
0359 20 
0352 20 
0413 WBC 3.9*  --  4.2 HGB 9.7*  --  9.8* *  --  109* BUN  --  18 23* CREA  --  0.94 1.06  
SGOT  --  14* 18  
AP  --  115 123* TBILI  --  0.6 0.6 Cultures: No results found for: [facility] Lab Results Component Value Date/Time Culture result: LIGHT YEAST (A) 2020 10:01 AM  
 Culture result: LIGHT NORMAL RESPIRATORY TIAN 2020 10:01 AM  
 Culture result: NO GROWTH 5 DAYS 2020 09:52 AM  
 
 
Radiology:  
 
Line/Insert Date:        
 
Assessment:  
 
1. UTI--Serratia (2 biotypes) from culture and small amount of Enterococcus--now with Pseudomonas from culture of urine and blood 2. CHF/cardiomyopathy 3. ?aspiration event(s) 4. Renal insufficiency 5. Respiratory difficulties Objective: 1. Adjust antibiotic therapy 2. Presence of LVAD in face of bacteremia may require longer course of therapy, or at least PO Rx for a time 3. Work-up any fever 4. Follow-up pending cultures and studies Gracy Sher MD

## 2020-01-24 NOTE — PROGRESS NOTES
Problem: Mobility Impaired (Adult and Pediatric) Goal: *Acute Goals and Plan of Care (Insert Text) Description FUNCTIONAL STATUS PRIOR TO ADMISSION: Patient was modified independent using a single point cane for functional mobility. Patient reports an increasingly sedentary lifestyle 2* fatigue and SOB/dyspnea. Retired (so is his wife). Patient reports x 3 falls within the last couple of weeks. Patient is wearing either nasal cannula or CPAP at night. LVAD work-up has been initiated. HOME SUPPORT PRIOR TO ADMISSION: The patient lived with his wife, but did not require physical assistance. Physical Therapy Goals: 
 
Reassessment completed 1/23/2020 and goals upgraded as appropriate. 1.  Patient will move from supine to sit and sit to supine, scoot up and down and roll side to side in bed with supervision within 7 days. 2.  Patient will perform sit to/from stand with contact guard assistance x 1 within 7 days. 3.  Patient will ambulate 50 feet, twice, with least restrictive assistive device and moderate assistance within 7 days. 4.  Stair goal to be formulated when appropriate. 5.  Patient will perform cardiac exercises per protocol with independence and verbal cuing only for performance remembrance BID within 7 days. 6.  Patient will verbally and functionally recall mindful movement principles (Move in the Tube) with minimal verbal cuing/reminding within 7 days. 7.  Patient will perform power exchange for power module to/from battery with verbal cuing within 7 days. Reassessed 1/17/2020 and goals upgraded as approporiate Goals upgraded as appropriate 1/07/2020- Goals appropriate 1/9/2020 on weekly reassessment 1. Patient will move from supine to sit and sit to supine, scoot up and down and roll side to side in bed with supervision within 7 days. 2.  Patient will perform sit to/from stand with minimal assistance x 1 within 7 days. - Upgrade to contact guard assist 1/17/2020 3.  Patient will ambulate 50 feet with least restrictive assistive device and moderate assistance within 7 days. 4.  Stair goal to be formulated when appropriate. 5.  Patient will perform cardiac exercises per protocol with supervision within 7 days. 6.  Patient will verbally and functionally recall mindful movement principles (Move in the Tube) with minimal verbal cuing/reminding within 7 days. 7.  Patient will perform power exchange for power module to/from battery with minimal assistance within 7 days. - Upgrade to verbal cuing only 1/17/2020 Reassessed on 1/2/2020 and goals not met, carry-over. Reassessed on 12/26/2019, goals not met but remain appropriate, so carry over Weekly reassessment completed 12/19/2019 and goals downgraded as appropriate. 1.  Patient will move from supine to sit and sit to supine, scoot up and down and roll side to side in bed with contact guard assistance within 7 days. 2.  Patient will perform sit to/from stand with minimal assistance x 1 within 7 days. 3.  Patient will ambulate 25 feet with least restrictive assistive device and moderate assistance within 7 days. 4.  Stair goal to be formulated when appropriate. 5.  Patient will perform cardiac exercises per protocol with supervision within 7 days. 6.  Patient will verbally and functionally recall mindful movement principles (Move in the Tube) with minimal verbal cuing/reminding within 7 days. 7.  Patient will perform power exchange for power module to/from battery with moderate assistance within 7 days. Re-evaluation completed 11/20/2019 and new goals formulated following LVAD implantation. Reviewed and cont 12/3/2019. Reviewed and continued 12/12/2019 1. Patient will move from supine to sit and sit to supine, scoot up and down and roll side to side in bed with minimal assistance/contact guard assist within 7 days.    
2.  Patient will perform sit to/from stand with minimal assistance/contact guard assist within 7 days. 3.  Patient will ambulate 150 feet with least restrictive assistive device and minimal assistance/contact guard assist within 7 days. 4.  Patient will ascend/descend 4 stairs with handrail(s) with minimal assistance/contact guard assist within 7 days. 5.  Patient will perform cardiac exercises per protocol with supervision within 7 days. 6.  Patient will verbally and functionally recall 3/3 sternal precautions within 7 days. 7.  Patient will perform power exchange for power module to/from battery with moderate assistance  within 7 days. Initiated 10/27/2019; updated 11/15/2019 1. Patient will move from supine to sit and sit to supine, scoot up and down and roll side to side in bed with independence within 7 days. 2.  Patient will perform sit to/from stand with supervision/set-up within 7 days. 3.  Patient will ambulate 200 feet with least restrictive assistive device and supervision/set-up within 7 days. 4.  Patient will ascend/descend 4 stairs with  handrail(s) with supervision/set-up within 7 days for functional strengthening and community reintegration. Mihaela Browne 5.  Patient will verbally and functionally recall 3/3 sternal precautions within 7 days in preparation for LVAD implantation. 6.  Patient will perform a mock power exchange for power module to/from battery with supervision/set-up within 7 days in preparation for LVAD implantation. 1.  Patient will move from supine to sit and sit to supine, scoot up and down and roll side to side in bed with independence within 7 days. 2.  Patient will perform sit to/from stand with supervision/set-up within 7 days. 3.  Patient will ambulate 150 feet with least restrictive assistive device and supervision/set-up within 7 days. 4.  Patient will ascend/descend 4 stairs with  handrail(s) with supervision/set-up within 7 days for functional strengthening and community reintegration. Mihaela Browne 5.  Patient will verbally and functionally recall 3/3 sternal precautions within 7 days in preparation for LVAD implantation. 6.  Patient will perform a mock power exchange for power module to/from battery with supervision/set-up within 7 days in preparation for LVAD implantation. Outcome: Progressing Towards Goal 
 
PHYSICAL THERAPY TREATMENT Patient: Priyanka Arora (75 y.o. male) Date: 1/24/2020 Diagnosis: Acute decompensated heart failure ([facility] [address] [I50.9] <principal problem not specified> Procedure(s) (LRB): 
RIGHT HEART CATH (N/A) 3 Days Post-Op Precautions: Fall Chart, physical therapy assessment, plan of care and goals were reviewed. ASSESSMENT Patient continues with skilled PT services and is progressing towards goals. Pt had improved MAPs with OT. Pt was able to ambulate with assistance of two and a chair follow. Pt has decrease LE stability due to ataxia and decrease strength. Pt has tremors present in trunk and UE. Pt did report dizziness with activity. Pt returned supine for ECHO Current Level of Function Impacting Discharge (mobility/balance): Other factors to consider for discharge: PLAN : 
Patient continues to benefit from skilled intervention to address the above impairments. Continue treatment per established plan of care. to address goals. Recommendation for discharge: (in order for the patient to meet his/her long term goals) Therapy 3 hours per day 5-7 days per week This discharge recommendation: 
Has been made in collaboration with the attending provider and/or case management IF patient discharges home will need the following DME: to be determined (TBD) SUBJECTIVE:  
Patient stated I can try.  OBJECTIVE DATA SUMMARY:  
Cardiac diagnosis intervention: 
 
 
Critical Behavior: 
Neurologic State: Alert, Appropriate for age Orientation Level: Oriented X4 Cognition: Appropriate decision making, Appropriate for age attention/concentration, Appropriate safety awareness, Follows commands, Recognition of people/places Safety/Judgement: Decreased insight into deficits, Decreased awareness of need for safety, Decreased awareness of need for assistance Functional Mobility Training: 
Bed Mobility: 
  
  
Sit to Supine: Contact guard assistance Transfers: 
Sit to Stand: Minimum assistance; Moderate assistance;Assist x1 Stand to Sit: Minimum assistance Balance: 
Standing: Impaired Standing - Static: Fair Standing - Dynamic : Poor Ambulation/Gait Training: 
Distance (ft): 30 Feet (ft)(seated break, 40feet break, 70 feet) Assistive Device: Gait belt;Walker, rolling Ambulation - Level of Assistance: Minimal assistance; Moderate assistance;Assist x2 Gait Abnormalities: Ataxic;Decreased step clearance; Path deviations Base of Support: Center of gravity altered Stairs: VAD power transition Patient performed switchover from power module to battery. Completed the following tasks: 
 Independent Verbal cues Physical assist Comments Don holster vest      
Check batteries Check  Ensure  clipped onto stabilization belt Position batteries in clips Disconnect power leads Insert power lead into battery Sloan batteries in [employer] Secure batteries with velcro and clips Patient performed switchover from battery to power module. Completed the following tasks: 
 Independent Verbal cues Physical assistance Comments Remove batteries from holster  x Disconnect power leads from battery x   Increase time Reconnect power leads into power module x   Increase time Remove batteries from clips x   Increase time Doff holster vest   x Therapeutic Exercises:  
 
 
Pain Rating: No complaints Activity Tolerance:  
Limited Please refer to the flowsheet for vital signs taken during this treatment. After treatment patient left in no apparent distress:  
Supine in bed, Heels elevated for pressure relief, and Call bell within reach COMMUNICATION/COLLABORATION:  
The patients plan of care was discussed with: Registered Nurse Gina Calixto PTA Time Calculation: 32 mins

## 2020-01-24 NOTE — PROGRESS NOTES
SUBJECTIVE Montserrat Bryant is a 71 y.o. male status post LVAD for CHF who appears to be having choreiform movements/dystonic appearing tremors involving the head, neck and all extremities. 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 Keppra did not provide much relief and was started on olanzapine 2.5 mg at bedtime. This seems to have made a noticeable difference. EXAM 
Exam: 
Visit Vitals BP 91/72 (BP 1 Location: Left arm, BP Patient Position: At rest) Pulse (!) 107 Temp 97.8 °F (36.6 °C) Resp 16 Ht 6' 2\" (1.88 m) Wt 77.9 kg (171 lb 11.8 oz) SpO2 97% BMI 22.05 kg/m² Gen: 
CV: RRR Lungs: non labored breathing Abd: non distending Neuro: A&O x 3, no dysarthria or aphasia CN II-XII: PERRL, EOMI, face symmetric, tongue/palate midline Motor: strength 5/5 all four ext. Myoclonic type/dystonic, slow tremor seen in head neck and upper extremities Sensory: intact to LT Gait: normal 
 
LABS/ IMAGING Lab Results Component Value Date/Time WBC 3.9 (L) 01/24/2020 03:59 AM  
 HGB 9.7 (L) 01/24/2020 03:59 AM  
 HCT 32.5 (L) 01/24/2020 03:59 AM  
 PLATELET 362 (L) 89/20/9535 03:59 AM  
 MCV 98.5 01/24/2020 03:59 AM  
 
Lab Results Component Value Date/Time  Sodium 138 01/24/2020 03:52 AM  
 Potassium 4.4 01/24/2020 03:52 AM  
 Chloride 104 01/24/2020 03:52 AM  
 CO2 29 01/24/2020 03:52 AM  
 Anion gap 5 01/24/2020 03:52 AM  
 Glucose 91 01/24/2020 03:52 AM  
 BUN 18 01/24/2020 03:52 AM  
 Creatinine 0.94 01/24/2020 03:52 AM  
 BUN/Creatinine ratio 19 01/24/2020 03:52 AM  
 GFR est AA >60 01/24/2020 03:52 AM  
 GFR est non-AA >60 01/24/2020 03:52 AM  
 Calcium 8.7 01/24/2020 03:52 AM  
 Bilirubin, total 0.6 01/24/2020 03:52 AM  
 AST (SGOT) 14 (L) 01/24/2020 03:52 AM  
 Alk. phosphatase 115 01/24/2020 03:52 AM  
 Protein, total 5.9 (L) 01/24/2020 03:52 AM  
 Albumin 2.3 (L) 01/24/2020 03:52 AM  
 Globulin 3.6 01/24/2020 03:52 AM  
 A-G Ratio 0.6 (L) 01/24/2020 03:52 AM  
 ALT (SGPT) 14 01/24/2020 03:52 AM  
 
 
 
ASSESSMENT Hospital Problems  Date Reviewed: 9/3/2019 Codes Class Noted POA Tremor ICD-10-CM: R25.1 ICD-9-CM: 781.0  1/20/2020 Unknown Acute decompensated heart failure (HCC) ICD-10-CM: I50.9 ICD-9-CM: 428.0  10/25/2019 Unknown PLAN Olanzapine 2.5 mg at bedtime seems to have made a noticeable difference We will try increasing the dose to 5 mg at bedtime If we see further improvement with increased Olanzapine, may discontinue Adrianne Garber MD

## 2020-01-24 NOTE — PROGRESS NOTES
1463: Bedside and Verbal shift change report given to Girish Gonzalez RN (oncoming nurse) by Fidelina Tony RN (offgoing nurse). Report included the following information SBAR, Kardex, ED Summary, Procedure Summary, Intake/Output, MAR, Recent Results and Med Rec Status.

## 2020-01-24 NOTE — PROGRESS NOTES
Problem: Self Care Deficits Care Plan (Adult) Goal: *Acute Goals and Plan of Care (Insert Text) Description FUNCTIONAL STATUS PRIOR TO ADMISSION: Patient was modified independent using a single point cane for functional mobility. Patient able to shower and dress himself. However, patient required assistance for household management from his wife. HOME SUPPORT: The patient lived alone with wife to provide assistance. Continue all goals 1/24/2020 Occupational Therapy Goals all updated/continued as follows 1/17/2020 1. Patient will perform upper body dressing including management of LVAD with supervision/setup within 7 day(s) 2. Patient will perform lower body dressing with RW CGA within 7 day(s). -continue goal (MOD A simulated 1/17) 3. Patient will perform grooming standing with RW 2 mins with supervision/setup within 7 day(s). -continue goal (CGA 1/17) 4. Patient will perform toilet transfers with RW supervision/setup using LRAD within 7 day(s). 5.  Patient will perform all aspects of toileting with RW with minimal assistance within 7 day(s). 6.  Patient will perform gathering ADL items high and waist height 2/2 with RW supervision within 7 days. -continue goal (CGA 1/17) Occupational Therapy Goals all updated 1/10/2020 1. Patient will perform upper body dressing including management of LVAD with min A within 7 day(s) 2. Patient will perform lower body dressing with RW CGA within 7 day(s). 3.  Patient will perform grooming standing with RW 2 mins with supervision/setup within 7 day(s). 4.  Patient will perform toilet transfers with RW minimum assistance within 7 day(s). 5.  Patient will perform all aspects of toileting with RW with moderate assistance within 7 day(s). 6.  Patient will perform gathering ADL items high and waist height 2/2 with RW supervision within 7 days. Continue all goals 10/30/19 post re-eval for Impella removal  
Initiated 10/28/2019 1.  Patient will perform bathing with supervision/set-up within 7 day(s). 2.  Patient will perform lower body dressing with supervision/set-up within 7 day(s). 3.  Patient will perform grooming with modified independence within 7 day(s). 4.  Patient will perform toilet transfers with modified independence within 7 day(s). 5.  Patient will perform all aspects of toileting with supervision/set-up within 7 day(s). 6.  Patient will participate in upper extremity therapeutic exercise/activities with supervision/set-up for 5 minutes within 7 day(s). 7.  Patient will utilize energy conservation techniques during functional activities with verbal cues within 7 day(s). Outcome: Progressing Towards Goal 
 OCCUPATIONAL THERAPY TREATMENT/WEEKLY RE-EVALUATION Patient: Eliz Benavidez (75 y.o. male) Date: 1/24/2020 [facility] [I50.9] <principal problem not specified> Procedure(s) (LRB): 
RIGHT HEART CATH (N/A) 3 Days Post-Op Precautions: Fall Chart, occupational therapy assessment, plan of care, and goals were reviewed. ASSESSMENT Based on the objective data described below, patient continues with generalized weakness, involuntary tremulous movements/impaired coordination, impaired balance, and decreased insight into deficits with patient waxing and waning with LVAD management due to inconsistencies with carryover on educated material. Patient performed switchover x SBA today demonstrating increased competence with all tasks. Patient improving BP with MAPs as follows in session: 88 supine, 85 seated EOB, and ~70 standing (unable to accurately obtain secondary to tremors). Continue to recommend IPR when patient medically stable for discharge to maximize safety and independence with ADL transfers and tasks.   
 
Current Level of Function Impacting Discharge (ADLs): MOD A LB ADLs; SBA for LVAD switchover today (inconsistent); MIN-MOD for standing balance (unable to tolerate for long periods-grooming seated for safety) Other factors to consider for discharge: LVAD HM III 
    
 
PLAN : 
Goals have been updated based on progression since last assessment. Patient continues to benefit from skilled intervention to address the above impairments. Continue to follow patient 5 times a week to address goals. Recommend with staff: OOB x 3 to chair Recommend next OT session: standing tolerance for grooming tasks Recommendation for discharge: (in order for the patient to meet his/her long term goals) Therapy 3 hours per day 5-7 days per week This discharge recommendation: 
Has been made in collaboration with the attending provider and/or case management Equipment recommendations for successful discharge (if) home: TBD SUBJECTIVE:  
Patient stated I feel better today.  OBJECTIVE DATA SUMMARY:  
Cognitive/Behavioral Status: 
Neurologic State: Alert Orientation Level: Oriented X4 Cognition: Appropriate for age attention/concentration Perception: Appears intact Perseveration: No perseveration noted Safety/Judgement: Decreased insight into deficits; Decreased awareness of need for safety;Decreased awareness of need for assistance Functional Mobility and Transfers for ADLs: 
Bed Mobility: 
Supine to Sit: Contact guard assistance Sit to Supine: Contact guard assistance Transfers: 
Sit to Stand: Minimum assistance Balance: 
Sitting: Impaired; Without support Sitting - Static: Good (unsupported) Sitting - Dynamic: Fair (occasional) Standing: Impaired Standing - Static: Fair Standing - Dynamic : Poor ADL Intervention: 
  
 
  
 
  
 
  
 
Upper Body Dressing Assistance Dressing Assistance: Stand-by assistance Patient demonstrated switchover from power module to battery in prep for ADL routine with SBA. Demonstrated the following tasks: Independent Verbal cues Physical assistance Comments Check PM & SC  x Ensure  clipped onto stabilization belt  x Remove front (green lighted) batteries from , place back batteries into front slots   x Check batteries x Position batteries into battery clips x Don holster vest  x Disconnect power leads  x Insert power lead into battery clip  x [place] batteries in holster  x Secure batteries with Velcro and clips  x Cognitive Retraining Safety/Judgement: Decreased insight into deficits; Decreased awareness of need for safety;Decreased awareness of need for assistance Activity Tolerance:  
Fair, requires rest breaks, and hypotension Please refer to the flowsheet for vital signs taken during this treatment. After treatment patient left in no apparent distress: PT assumed patient care COMMUNICATION/COLLABORATION:  
The patients plan of care was discussed with: Physical Therapist and Registered Nurse Tru Lucio Time Calculation: 26 mins

## 2020-01-24 NOTE — PROGRESS NOTES
Transitions of Care Plan: Anticipate discharge to [facility] inpatient rehab Monday pending acceptance, bed availability, medical progress Transportation:  [employer] or ALS transport 1300 - CM sent updated clinicals and therapy notes to [address] [place] [address] (f: [phone]). 1305 - CM left  for [facility], admissions with [facility] (p: [phone]). Requested callback for update. CM will continue to follow.  
 
Karina Moreno, MPH

## 2020-01-24 NOTE — PROGRESS NOTES
Problem: Falls - Risk of 
Goal: *Absence of Falls Description Document Jessica Goncalves Fall Risk and appropriate interventions in the flowsheet. Outcome: Progressing Towards Goal 
Note: Fall Risk Interventions: 
Mobility Interventions: Communicate number of staff needed for ambulation/transfer, OT consult for ADLs, Patient to call before getting OOB, PT Consult for mobility concerns Mentation Interventions: Bed/chair exit alarm, Evaluate medications/consider consulting pharmacy, Gait belt with transfers/ambulation Medication Interventions: Evaluate medications/consider consulting pharmacy, Teach patient to arise slowly, Patient to call before getting OOB Elimination Interventions: Call light in reach, Patient to call for help with toileting needs History of Falls Interventions: Consult care management for discharge planning, Evaluate medications/consider consulting pharmacy, Room close to nurse's station Problem: Pressure Injury - Risk of 
Goal: *Prevention of pressure injury Description Document Mich Scale and appropriate interventions in the flowsheet. Offload heels Turn approximately every 2 hours Outcome: Progressing Towards Goal 
Note: Pressure Injury Interventions: 
Sensory Interventions: Assess changes in LOC, Check visual cues for pain, Discuss PT/OT consult with provider, Keep linens dry and wrinkle-free, Minimize linen layers, Monitor skin under medical devices Moisture Interventions: Check for incontinence Q2 hours and as needed Activity Interventions: Increase time out of bed, Pressure redistribution bed/mattress(bed type), PT/OT evaluation Mobility Interventions: HOB 30 degrees or less, Pressure redistribution bed/mattress (bed type), PT/OT evaluation Nutrition Interventions: Document food/fluid/supplement intake, Offer support with meals,snacks and hydration Friction and Shear Interventions: Lift sheet, Minimize layers Problem: Heart Failure: Discharge Outcomes Goal: *Demonstrates ability to perform prescribed activity without shortness of breath or discomfort Outcome: Progressing Towards Goal 
Goal: *Left ventricular function assessment completed prior to or during stay, or planned for post-discharge Outcome: Progressing Towards Goal 
Goal: *Verbalizes understanding and describes prescribed diet Outcome: Progressing Towards Goal

## 2020-01-24 NOTE — PROGRESS NOTES
[address] [facility] in [place], [place] Inpatient Progress Note Patient name: Martha Gallagher Patient : 1950 Patient MRN: [id number] Attending MD: Yue Dalal MD 
Date of service: 20 Chief Complaint:  
Rosemary Herzog azucena LVAD implant 
  
HPI: Sona Kiser is a 71y.o. year old pleasant white male with a history of HTN, HLD, JOSE, CAD s/p cardiac arrest VFib s/p CABG () c/b sternal would infection and sternectomy, ischemic cardiomyopathy LVEF 15-20%, s/p ICD and with LBBB. He was admitted with acute on chronic systolic heart failure with massive volume overload > 20 lbs, in the setting of atrial fibrillation s/p failed DCCV and underwent DCCV and RHC on .  S/p BiVICD on 19 with Dr. Yariel Hanks. He was discharged home home on IV milrinone on 19. Billy Rosas has been followed closely by Dr. Kelsey Mays and the [facility]. 
  
Mr. Kiser was admitted for acute on chronic systolic heart failure. He underwent implantation of Impella 5.0 due to CS and has completed his LVAD evaluation. Billy Rosas meets criteria for implant of HM3 as DT. He was NYHA Class IV prior to implant of Impella 5.0, has LVEF < 15%, was intolerant of GDMT due to symptomatic hypotension and renal dysfunction. He remains dependent on temporary MCS support. RHC  revealed compensated hemodynamics on Impella. His renal function has improved dramatically with improvement in his hemodynamics. He is not a suitable transplant candidate due to single kidney, sternectomy, debility, and frailty. He was reviewed by Vasquez Mejia and felt to be a high risk heart transplant candidate due to multiple co-morbidities as well as the afore mentioned conditions.  He remained in the CCU and underwent a HM 3 implant as DT on . He was weaned off of pressors and transferred to [place] [address] where he continues to undergo PT/OT and hemodynamic optimization.   
  
24Hr Events:  
No acute overnight events Tremors unchanged Tolerating being in chair x 2 Procedure:  Procedure(s): REMOVAL TEMPORARY LEFT VENTRICULAR ASSIST DEVICE, IMPLANTATION OF LEFT VENTRICULAR ASSIST DEVICE PERMANENT (VAD), ECC, JACQUE, EPI AORTIC US BY DR Christian Gaming.    
POD:  67 
  
Impression / Plan:  
Heart Failure Status: NYHA Class IV, improved to NYHA Class III Chronic systolic heart failure due to ICM, NYHA Class IV, EF < 15%, cardiogenic shock bridged with Impella 5.0 support to HM 3 implant S/p Impella removal and LVAD implant 11/18/19 [address] 1/20/20, CI 3, filling pressures low Set speed to 5800rpm- order changed Repeat TTE this afternoon with current speed of 5800RPM 
Multiple PI events on interrogation Discontinued Sildenafil due to leukopenia Intolerant of BB due to RV failure Intolerant of ACEi/ARB/ARNI/AA due to JUAN J on CKD 3 Intolerant of spironolactone d/t IVVD No diuretics or fluid restriction Strict I/O, daily weights Continue LVAD education Dressing change frequency three times weekly, sutures removed 12/26/19 Chest CTA- no outflow graft occlusion Pet Scan ordered- pending PYP testing equivocal for amyloid Send Invitae- pending Bacteremia - Pseudomonas Blood cultures (12/31/19) 2/4 bottles +Pseudomonas & 2/4 bottles GNRs Repeat BC NGTD Stop cefepime- due to myoclonus Cont meropenem Follow procalcitonin and lactic acid levels - both improving Repeat sputum- few yeast, normal soren Repeat UA negative Orthostatic Hypotension Cont Florinef 0.1 mg daily Stim test unremarkable Remains orthostatic Cont Albumin today Correlate BP cuff with MAPs Salt tab daily Needs to increase PO intake Leukopenia Improved Peripheral smear-(1/15/2020) Pancytopenia without dysmyelopoiesis, dyserythropoiesis or platelet aggregation Hematology recommendations appreciated- likely nutritional or medications Stopped allopurinol, revatio, levaquin Thrombocytopenia Peripheral smear-(1/15/2020) Pancytopenia without dysmyelopoiesis, dyserythropoiesis or platelet aggregation Hematology following, appreciate assistance May be secondary to nutritional deficiency HIT negative Generalized myoclonus Unchanged Appreciate Neurology input Cont Keppra 250mg BID- monitor for dizziness Cont Olanzapine- will ask neurology to see today Head CT negative for acute process EEG suggestive of mild generalized encephalopathic process, possibly related to underlying structural brain injury vs metabolic abnormality Monitor closely Off Digoxin  
  
Anticoagulation for LVAD, INR goal 1.5-2 Goal decreased d/t persistent hematuria No ASA d/t hematuria Coumadin - 1mg qpm 
 
Acute Respiratory Failure post op Improved Wean FiO2 as able Pulmonary hygiene - recommend RT use flutter valve with nebs & Incentive spirometer Cont home CPAP- must use while sleeping Repeat PFTs yesterday CKD 3, atrophic left kidney Improved Appreciate Nephrology assistance- signed off Watch Cr, stable Avoid nephrotoxins, trend labs  
  
CAD s/p CABG Off ASA d/t hematuria No BB d/t RV failure Hold statin due to recent hepatic failure LHC performed 11/13 - low likelihood of benefit from revascularization  
  
Hx of VF arrest s/p BiV-ICD No recent shocks Keep K > 4 and Mg > 2  
Mag ox 800 mg po BID- watch for diarrhea Decrease potassium to 10meq daily ICD reprogrammed to reduce diaphragmatic stimulation PAF Off Dig due to lethargy and tremors No BB due to RV failure Repeat TFTs WNL 
  
Acute blood loss anemia Improved Likely due to hematuria Cont octreotide 25 mcg SQ TID Fecal occult 12/14 negative, positive on 12/17 Appreciate urology recommendations 
  
Urinary retention, c/b serratia UTI and hematuria Resolved Repeat UA with cx negative 1/13/20 Cystoscopy performed 11/13 shows catheter induced cystitis Pseudomonas aeruginosa positive UA culture (12/31/19) Stopped levaquin and cefepime for tremors Cont Meropenem Severe Acute on Chronic Protein Calorie Malnutrition Prealbumin weekly - 16.9 on 1/20 Appreciate Nutritionist assistance Diet as tolerated, encourage food from home, protein shakes Intolerant of mirtazapine d/t tremors, confusion Cont MVI, thiamine daily Staff to assist with meals Calorie count COPD Appreciate Pulmonology assistance- no changes to nebulizer regimen Continue nebs PRN   
PFTs yesterday Will follow up outpatient  
  
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  
  
Hx of sternal wound infection, sternectomy Sternum closed post op- monitor Delayed skin healing mid portion of sternotomy - evaluated by Dr. Evelyne Lemus, will continue IV abx and wet to dry dressings. Will likely require sternal wire after ~3 months from op date Vocal cord paralysis Improved ENT recs appreciated Speech therapy following - appreciate input Anxiety Cont klonopin to 0.25mg QHS prn  
Monitor Depression Cont Effexor to 150 mg PO qAM  
Monitor rhythm strips for prolonged QTc 
  
Debility Continue PT/OT  
OOB at least x3 daily/ all meals Bladder spasms Resolved Received pyridium 100mg x4 doses Oxybutynin 5mg Q6hr prn  
  
Ppx Protonix PT/OT  
+daily BM  
PICC placed 11/22/19 Labs QOD Do Not Disturb at night from 10p-6a 
  
Dispo: 
Remain in [facility] at this time Will need IP rehab. Plan on discharge Monday LVAD INTERROGATION: 
Device interrogated in person Device function normal, normal flow, PI events LVAD Pump Speed (RPM): 5800 Pump Flow (LPM): 4.4 MAP: 108 PI (Pulsitility Index): 5.9 Power: 4.5  Test: No 
Back Up  at Bedside & Labeled: Yes Power Module Test: No 
Driveline Site Care: No 
Driveline Dressing: Clean, Dry, and Intact Outpatient: No 
 MAP in Therapeutic Range (Outpatient): Yes Testing Alarms Reviewed: Yes 
Back up SC speed: 5800 Back up Low Speed Limit: 5400 Emergency Equipment with Patient?: Yes Emergency procedures reviewed?: Yes Drive line site inspected?: No 
Drive line intergrity inspected?: Yes Drive line dressing changed?: No 
 
PHYSICAL EXAM: 
Visit Vitals BP 91/72 (BP 1 Location: Left arm, BP Patient Position: At rest) Pulse 92 Temp 97.3 °F (36.3 °C) Resp 16 Ht 6' 2\" (1.88 m) Wt 171 lb 11.8 oz (77.9 kg) SpO2 100% BMI 22.05 kg/m² Physical Exam  
Constitutional: He is oriented to person, place, and time. He appears well-developed. He appears cachectic. No distress. Frustrated with tremors HENT:  
Head: Normocephalic. Neck: Normal range of motion. Neck supple. Cardiovascular: Normal rate, regular rhythm and normal heart sounds. + VAD hum Pulmonary/Chest: Effort normal and breath sounds normal. No respiratory distress. Abdominal: Soft. Bowel sounds are normal. He exhibits no distension. Musculoskeletal: Normal range of motion. General: No edema. Comments: tremors Neurological: He is alert and oriented to person, place, and time. He displays tremor. Skin: Skin is warm and dry. Psychiatric: His speech is normal and behavior is normal. His mood appears anxious. ~1 cm x 1 cm x 0.25 cm area of delayed closure on sternotomy. Site clean, no erythema or drainage noted. Subcutaneous tissue noted. Nursing note and vitals reviewed. REVIEW OF SYSTEMS: 
Review of Systems Constitutional: Positive for malaise/fatigue. Negative for chills and fever. Tired HENT: Negative for congestion and nosebleeds. Eyes: Negative. Respiratory: Negative for cough and shortness of breath. Cardiovascular: Negative for chest pain, palpitations, orthopnea and leg swelling. Orthostatic Gastrointestinal: Negative for heartburn, nausea and vomiting. Genitourinary: Negative for dysuria and hematuria. Musculoskeletal: Negative. Neurological: Positive for dizziness, tremors and weakness. Negative for headaches. Mild dizziness - improving Endo/Heme/Allergies: Does not bruise/bleed easily. Psychiatric/Behavioral: The patient is nervous/anxious. PAST MEDICAL HISTORY: 
Past Medical History:  
Diagnosis Date  Degenerative disc disease, lumbar  Heart failure ([facility] [address]  High cholesterol  Hypertension  Paroxysmal atrial fibrillation ([facility] [address] 4/2/2019  Spinal stenosis PAST SURGICAL HISTORY: 
Past Surgical History:  
Procedure Laterality Date  COLONOSCOPY Left 11/11/2019 COLONOSCOPY at bedside performed by Mari Hernández MD at [facility] [address]  HX CORONARY ARTERY BYPASS GRAFT    
 triple  HX HERNIA REPAIR    
 HX IMPLANTABLE CARDIOVERTER DEFIBRILLATOR  ME CARDIOVERSION ELECTIVE ARRHYTHMIA EXTERNAL N/A 6/10/2019 EP [facility]ARD[facility] [address] [facility]  ME CARDIOVERSION ELECTIVE ARRHYTHMIA EXTERNAL N/A 6/18/2019 EP CARDIOVERSION [facility] [address] [facility]  ME INSJ/RPLCMT PERM DFB W/TRNSVNS LDS 1/DUAL CHMBR N/A 6/21/2019 INSERT ICD BIV MULTI performed by Manuela Soriano MD at [address] CATH LAB  ME TCAT IMPL WRLS P-ART PRS SNR L-T HEMODYN MNTR N/A 9/18/2019 IMPLANT HEART FAILURE MONITORING DEVICE [place] [address] CATH LAB FAMILY HISTORY: 
Family History Problem Relation Age of Onset  Lung Disease Mother  Hypertension Mother [facility] Arthritis-osteo Mother  Heart Disease Mother  Heart Disease Father  Diabetes Father SOCIAL HISTORY: 
Social History Socioeconomic History  Marital status:  Spouse name: Not on file  Number of children: Not on file  Years of education: Not on file  Highest education level: Not on file Tobacco Use  Smoking status: Former Smoker Last attempt to quit: 2010 Years since quittin.1  Smokeless tobacco: Never Used Substance and Sexual Activity  Alcohol use: Not Currently Comment: rarely  Drug use: Never Other Topics Concern LABORATORY RESULTS: 
  
Labs Latest Ref Rng & Units 2020 WBC 4.1 - 11.1 K/uL 3. 9(L) 4.2 3.6(L) 3. 3(L) 2. 6(L) 2. 5(L) 2. 4(L)  
RBC 4.10 - 5.70 M/uL 3.30(L) 3.35(L) 3.42(L) 3.32(L) 3.11(L) 3.25(L) 3.22(L) Hemoglobin 12.1 - 17.0 g/dL 9.7(L) 9.8(L) 10. 1(L) 9.8(L) 9.2(L) 9.5(L) 9.5(L) Hematocrit 36.6 - 50.3 % 32. 5(L) 33. 0(L) 32. 7(L) 32. 2(L) 29. 5(L) 30. 8(L) 30. 5(L) MCV 80.0 - 99.0 FL 98.5 98.5 95.6 97.0 94.9 94.8 94.7 Platelets 877 - 728 K/uL 124(L) 109(L) 89(L) 69(L) 68(L) 78(L) 83(L) Lymphocytes 12 - 49 % - - 8(L) 11(L) 19 10(L) - Monocytes 5 - 13 % - - 9 5 12 6 - Eosinophils 0 - 7 % - - 0 2 4 2 - Basophils 0 - 1 % - - 1 0 1 0 - Bands 0 - 6 % - - 0 2 2 3 - Blasts 0 % - - 0 0 0 0 - Albumin 3.5 - 5.0 g/dL 2. 3(L) 2. 2(L) 2. 2(L) 2. 1(L) 2. 1(L) 2. 2(L) 2. 0(L) Calcium 8.5 - 10.1 MG/DL 8.7 9.0 8.9 8.6 8.4(L) 8.4(L) 8. 1(L) SGOT 15 - 37 U/L 14(L) 18 21 29 31 38(H) 57(H) Glucose 65 - 100 mg/dL 91 88 93 105(H) 92 103(H) 114(H) BUN 6 - 20 MG/DL 18 23(H) 25(H) 25(H) 25(H) 23(H) 23(H) Creatinine 0.70 - 1.30 MG/DL 0.94 1.06 1.05 1.00 0.99 0.97 1.02 Sodium 136 - 145 mmol/L 138 137 137 134(L) 133(L) 131(L) 132(L) Potassium 3.5 - 5.1 mmol/L 4.4 4.9 4.4 3.9 4.1 3.8 3. 2(L) TSH 0.36 - 3.74 uIU/mL - - - - - - -  
LDH 85 - 241 U/L 170 204 245(H) 274(H) 251(H) 252(H) 253(H) Some recent data might be hidden Lab Results Component Value Date/Time TSH 2.30 2019 03:29 AM  
 TSH 2.40 10/25/2019 07:39 PM  
 TSH 2.45 2019 04:16 AM  
 
 
ALLERGY: 
No Known Allergies CURRENT MEDICATIONS: 
Current Facility-Administered Medications Medication Dose Route Frequency  warfarin (COUMADIN) tablet 1 mg  1 mg Oral QPM  
 albumin human 25% (BUMINATE) solution 12.5 g  12.5 g IntraVENous BID  acetylcysteine (MUCOMYST) 200 mg/mL (20 %) solution 600 mg  600 mg Nebulization TID RT  
 potassium chloride SR (KLOR-CON 10) tablet 20 mEq  20 mEq Oral DAILY  OLANZapine (ZyPREXA) tablet 2.5 mg  2.5 mg Oral QPM  
 sodium chloride (NS) flush 5-40 mL  5-40 mL IntraVENous PRN  
 naloxone (NARCAN) injection 0.4 mg  0.4 mg IntraVENous PRN  
 hydrALAZINE (APRESOLINE) 20 mg/mL injection 20 mg  20 mg IntraVENous Q6H PRN  
 clonazePAM (KlonoPIN) tablet 0.25 mg  0.25 mg Oral QHS PRN  
 epoetin yvonne-epbx (RETACRIT) injection 20,000 Units  20,000 Units SubCUTAneous Q MON, WED & FRI  meropenem (MERREM) 500 mg in 0.9% sodium chloride (MBP/ADV) 50 mL  0.5 g IntraVENous Q6H  
 levETIRAcetam (KEPPRA) tablet 250 mg  250 mg Oral BID  senna-docusate (PERICOLACE) 8.6-50 mg per tablet 1 Tab  1 Tab Oral PRN  
 collagenase (SANTYL) 250 unit/gram ointment   Topical DAILY  fludrocortisone (FLORINEF) tablet 0.1 mg  0.1 mg Oral DAILY  cholecalciferol (VITAMIN D3) (1000 Units /25 mcg) tablet 2 Tab  2,000 Units Oral DAILY  venlafaxine-SR (EFFEXOR-XR) capsule 150 mg  150 mg Oral DAILY WITH BREAKFAST  hydrocortisone (CORTAID) 1 % cream   Topical TID PRN  thiamine HCL (B-1) tablet 100 mg  100 mg Oral DAILY  oxybutynin (DITROPAN) tablet 5 mg  5 mg Oral Q6H PRN  
 magnesium oxide (MAG-OX) tablet 800 mg  800 mg Oral BID  lidocaine (URO-JET/ GLYDO) 2 % jelly   Urethral PRN  
 albuterol-ipratropium (DUO-NEB) 2.5 MG-0.5 MG/3 ML  3 mL Nebulization Q6H PRN  therapeutic multivitamin (THERAGRAN) tablet 1 Tab  1 Tab Oral DAILY  alteplase (CATHFLO) 1 mg in sterile water (preservative free) 1 mL injection  1 mg InterCATHeter PRN  finasteride (PROSCAR) tablet 5 mg  5 mg Oral DAILY  diphenhydrAMINE (BENADRYL) capsule 25 mg  25 mg Oral QHS PRN  
  octreotide (SANDOSTATIN) injection 25 mcg  25 mcg SubCUTAneous TID  pantoprazole (PROTONIX) tablet 40 mg  40 mg Oral ACB  arformoterol (BROVANA) neb solution 15 mcg  15 mcg Nebulization BID RT And  
 budesonide (PULMICORT) 500 mcg/2 ml nebulizer suspension  500 mcg Nebulization BID RT  
 lactobac ac& pc-s.therm-b.anim (TIAN Q/RISAQUAD)  1 Cap Oral DAILY  oxyCODONE IR (ROXICODONE) tablet 5 mg  5 mg Oral Q6H PRN  
 tamsulosin (FLOMAX) capsule 0.4 mg  0.4 mg Oral DAILY  Warfarin MD/NP dosing   Other PRN  
 sodium chloride (NS) flush 5-40 mL  5-40 mL IntraVENous Q8H  
 sodium chloride (NS) flush 5-40 mL  5-40 mL IntraVENous PRN  
 acetaminophen (TYLENOL) tablet 650 mg  650 mg Oral Q6H PRN  
 naloxone (NARCAN) injection 0.4 mg  0.4 mg IntraVENous PRN  
 ondansetron (ZOFRAN) injection 4 mg  4 mg IntraVENous Q4H PRN  
 sucralfate (CARAFATE) tablet 1 g  1 g Oral AC&HS  influenza vaccine 2019-20 (6 mos+)(PF) (FLUARIX/FLULAVAL/FLUZONE QUAD) injection 0.5 mL  0.5 mL IntraMUSCular PRIOR TO DISCHARGE Thank you for allowing me to participate in this patient's care. TIERRA Kang [id number], [address] [facility] Phone: [phone] Fax: [phone] [facility] ATTENDING ADDENDUM Patient was seen and examined in person. Data and notes were reviewed. I have discussed and agree with the plan as noted in the NP note above without further additions. Ben Byrd MD PhD 
[phone] [facility]

## 2020-01-24 NOTE — PROGRESS NOTES
[facility] VAD/Heart Failure Progress Note Admit Date: 10/25/2019 POD:  3 Days Post-Op Procedure:  Procedure(s): RIGHT HEART CATH Subjective: Dyspnea, fatigue, and weakness; some tremors Objective:  
Vitals: 
Blood pressure 91/72, pulse (!) 107, temperature 97.8 °F (36.6 °C), resp. rate 16, height 6' 2\" (1.88 m), weight 171 lb 11.8 oz (77.9 kg), SpO2 97 %. Temp (24hrs), Av.7 °F (36.5 °C), Min:97.3 °F (36.3 °C), Max:98 °F (36.7 °C) Hemodynamics: 
 CO: CO (l/min): 5.8 l/min CI: CI (l/min/m2): 2.8 l/min/m2 CVP: CVP (mmHg): 4 mmHg (19 1645) SVR: SVR (dyne*sec)/cm5: 1080 (dyne*sec)/cm5 (19 1200) PAP Systolic: PAP Systolic: 34 (35/24/37 9101) PAP Diastolic: PAP Diastolic: 13 (60/11/58 9281) PVR:   
 SV02: SVO2 (%): 67 % (19 1300) SCV02: SCVO2 (%): 75 % (10/29/19 1900) VAD Interrogation: LVAD (Heartmate) Pump Speed (RPM): 5800 Pump Flow (LPM): 5.1 Chatter in Lines: No 
PI (Pulsitility Index): 3.4 Power: 4.5 MAP: 94  Test: No 
Back Up  at Bedside & Labeled: Yes Power Module Test: No 
Driveline Site Care: No 
Driveline Dressing: Clean, Dry, and Intact EKG/Rhythm:   
 
Extubation Date / Time:  
 
CT Output:  
 
Ventilator: 
Ventilator Volumes Vt Set (ml): 550 ml (19 08) Vt Exhaled (Machine Breath) (ml): 639 ml (19 0826) Vt Spont (ml): 437 ml (19 1035) Ve Observed (l/min): 7.25 l/min (19 1035) Oxygen Therapy: 
Oxygen Therapy O2 Sat (%): 97 % (20 1501) Pulse via Oximetry: 84 beats per minute (20 1424) O2 Device: Nasal cannula (20 1440) PEEP/CPAP (cm H2O): 2 cm H20 (20 1555) O2 Flow Rate (L/min): 1 l/min (20 1440) FIO2 (%): 21 % (20 0905) CXR: 
 
Admission Weight: Last Weight Weight: 192 lb 10.9 oz (87.4 kg) Weight: 171 lb 11.8 oz (77.9 kg) Intake / Output / Drain: 
Current Shift:  07 - 1900 In: 563.8 [P.O.:360; I.V.:203.8] Out: 250 [Urine:250] Last 24 hrs.:  
 
Intake/Output Summary (Last 24 hours) at 1/24/2020 1519 Last data filed at 1/24/2020 1350 Gross per 24 hour Intake 2027.5 ml Output 875 ml Net 1152.5 ml No results for input(s): CPK, CKMB, TROIQ in the last 72 hours. Recent Labs  
  01/24/20 0359 01/24/20 0352 01/22/20 0413 NA  --  138 137 K  --  4.4 4.9  
CO2  --  29 29 BUN  --  18 23* CREA  --  0.94 1.06  
GLU  --  91 88 MG  --  2.1 2.1 WBC 3.9*  --  4.2 HGB 9.7*  --  9.8* HCT 32.5*  --  33.0*  
*  --  109* Recent Labs  
  01/24/20 0352 01/22/20 0413 INR 2.1* 2.6* PTP 20.7* 24.7* No lab exists for component: PBNP Current Facility-Administered Medications:  
  warfarin (COUMADIN) tablet 1 mg, 1 mg, Oral, QPM, Levi, Shana B, NP 
  sodium chloride tablet 1 g, 1 g, Oral, DAILY, Levi, Shana B, NP, 1 g at 01/24/20 5822   [START ON 1/25/2020] potassium chloride SR (KLOR-CON 10) tablet 10 mEq, 10 mEq, Oral, DAILY, Levi, Shana B, NP 
  albumin human 25% (BUMINATE) solution 12.5 g, 12.5 g, IntraVENous, BID, Levi, Shana B, NP, 12.5 g at 01/24/20 1009   acetylcysteine (MUCOMYST) 200 mg/mL (20 %) solution 600 mg, 600 mg, Nebulization, TID RT, Christiano Andrews MD, 600 mg at 01/24/20 1423   OLANZapine (ZyPREXA) tablet 2.5 mg, 2.5 mg, Oral, QPM, Levi, Shana B, NP, 2.5 mg at 01/23/20 1752   sodium chloride (NS) flush 5-40 mL, 5-40 mL, IntraVENous, PRN, Liz Bautista MD, 20 mL at 01/24/20 1058   naloxone [facility]) injection 0.4 mg, 0.4 mg, IntraVENous, PRN, Liz Bautista MD 
  hydrALAZINE (APRESOLINE) 20 mg/mL injection 20 mg, 20 mg, IntraVENous, Q6H PRN, Alexi Simsin B, NP, 20 mg at 01/23/20 5830   clonazePAM (KlonoPIN) tablet 0.25 mg, 0.25 mg, Oral, QHS PRN, Levi, Shana B, NP 
  epoetin yvonne-epbx (RETACRIT) injection 20,000 Units, 20,000 Units, SubCUTAneous, Q MON, WED & FRI, Levi, Shana B, NP, 20,000 Units at 01/23/20 5661   meropenem (MERREM) 500 mg in 0.9% sodium chloride (MBP/ADV) 50 mL, 0.5 g, IntraVENous, Q6H, Juan C Olivares MD, Last Rate: 100 mL/hr at 01/24/20 1059, 500 mg at 01/24/20 1059   levETIRAcetam (KEPPRA) tablet 250 mg, 250 mg, Oral, BID, Javed Beck MD, 250 mg at 01/24/20 4927   senna-docusate (PERICOLACE) 8.6-50 mg per tablet 1 Tab, 1 Tab, Oral, PRN, Ann Marie Santos MD, 1 Tab at 01/18/20 0091   collagenase (SANTYL) 250 unit/gram ointment, , Topical, DAILY, Levi Shana B, NP 
  fludrocortisone (FLORINEF) tablet 0.1 mg, 0.1 mg, Oral, DAILY, Levi Shana B, NP, 0.1 mg at 01/24/20 3131   cholecalciferol (VITAMIN D3) (1000 Units /25 mcg) tablet 2 Tab, 2,000 Units, Oral, DAILY, Nicho Herrera NP, 2 Tab at 01/24/20 0700   venlafaxine-SR (EFFEXOR-XR) capsule 150 mg, 150 mg, Oral, DAILY WITH BREAKFAST, Checo Herrera, NP, 150 mg at 01/24/20 0181   hydrocortisone (CORTAID) 1 % cream, , Topical, TID PRN, Clem Altman MD 
  thiamine HCL (B-1) tablet 100 mg, 100 mg, Oral, DAILY, Ronni Wallace NP, 100 mg at 01/24/20 6775   oxybutynin (DITROPAN) tablet 5 mg, 5 mg, Oral, Q6H PRN, Levi Shana B, NP, 5 mg at 01/01/20 1204   magnesium oxide (MAG-OX) tablet 800 mg, 800 mg, Oral, BID, Ronni Wallace NP, 800 mg at 01/24/20 7918   lidocaine (URO-JET/ GLYDO) 2 % jelly, , Urethral, PRN, Mounika Altman MD 
  albuterol-ipratropium (DUO-NEB) 2.5 MG-0.5 MG/3 ML, 3 mL, Nebulization, Q6H PRN, Apple Santacruz MD, 3 mL at 01/24/20 1423   therapeutic multivitamin (THERAGRAN) tablet 1 Tab, 1 Tab, Oral, DAILY, Shana Sims, NP, 1 Tab at 01/24/20 7618   alteplase (CATHFLO) 1 mg in sterile water (preservative free) 1 mL injection, 1 mg, InterCATHeter, PRN, Mounika Elkins MD, 1 mg at 01/20/20 1156   finasteride (PROSCAR) tablet 5 mg, 5 mg, Oral, DAILY, Fiona Gamboa, MD, 5 mg at 01/24/20 4735   diphenhydrAMINE (BENADRYL) capsule 25 mg, 25 mg, Oral, QHS PRN, Polliard, Haley Ripple T, NP, 25 mg at 01/19/20 0044   octreotide (SANDOSTATIN) injection 25 mcg, 25 mcg, SubCUTAneous, TID, Polliard, Haley Ripple T, NP, 25 mcg at 01/24/20 1027 
  pantoprazole (PROTONIX) tablet 40 mg, 40 mg, Oral, ACB, Polliard, Haley Ripple T, NP, 40 mg at 01/24/20 0630 
  arformoterol (BROVANA) neb solution 15 mcg, 15 mcg, Nebulization, BID RT, 15 mcg at 01/24/20 0726 **AND** budesonide (PULMICORT) 500 mcg/2 ml nebulizer suspension, 500 mcg, Nebulization, BID RT, Polliard, [place] Fuse, NP, 500 mcg at 01/24/20 0714 
  lactobac ac& pc-s.therm-b.anim (TIAN Q/RISAQUAD), 1 Cap, Oral, DAILY, Elier Brown MD, 1 Cap at 01/24/20 8198   oxyCODONE IR (ROXICODONE) tablet 5 mg, 5 mg, Oral, Q6H PRN, Kurt Harris MD, 5 mg at 12/30/19 0405   tamsulosin (FLOMAX) capsule 0.4 mg, 0.4 mg, Oral, DAILY, Logan Kincaid MD, 0.4 mg at 01/24/20 6928   Warfarin MD/NP dosing, , Other, PRN, Mounika Altman MD 
  sodium chloride (NS) flush 5-40 mL, 5-40 mL, IntraVENous, Q8H, Harshal Aleman MD, 10 mL at 01/24/20 0631 
  sodium chloride (NS) flush 5-40 mL, 5-40 mL, IntraVENous, PRN, Kurt Harirs MD, 10 mL at 01/20/20 1326   acetaminophen (TYLENOL) tablet 650 mg, 650 mg, Oral, Q6H PRN, Kurt Harris MD, 650 mg at 01/21/20 1512 
  naloxone [facility]) injection 0.4 mg, 0.4 mg, IntraVENous, PRN, Kurt Harris MD 
  ondansetron [facility]) injection 4 mg, 4 mg, IntraVENous, Q4H PRN, Kurt Harris MD, 4 mg at 01/24/20 4323 
  sucralfate (CARAFATE) tablet 1 g, 1 g, Oral, AC&HS, Kurt Harris MD, 1 g at 01/24/20 1217 
  influenza vaccine 2019-20 (6 mos+)(PF) (FLUARIX/FLULAVAL/FLUZONE QUAD) injection 0.5 mL, 0.5 mL, IntraMUSCular, PRIOR TO DISCHARGE, Mounika Altman MD 
 
A/P 
  
S/P LVAD - good flows Need for anti-coagulation - coumadin DM - insulin RV dysfunction - milrinone, diuretics  
  
 Risk of morbidity and mortality - high Medical decision making - high complexity Signed By: Rupinder Benitez MD

## 2020-01-25 NOTE — PROGRESS NOTES
1930 Bedside and Verbal shift change report given to Shabbir Cervantes (oncoming nurse) by Marie Costa RN (offgoing nurse). Report included the following information SBAR, Kardex, Procedure Summary, MAR, Cardiac Rhythm Paced w/ underlying AFIB and Alarm Parameters . 0730 Bedside and Verbal shift change report given to Mazin Granado RN (oncoming nurse) by Abril Smith RN (offgoing nurse). Report included the following information SBAR, Kardex, MAR, Cardiac Rhythm Paced w/ underlying AFIB and Alarm Parameters .

## 2020-01-25 NOTE — PROCEDURES
[address] [facility] PULMONARY FUNCTION TEST Name:  Oksana Remy 
MR#:  [id number] :  1950 ACCOUNT #:  [de-identified] DATE OF SERVICE:  2020 INDICATION:  Shortness of breath. FINDINGS: 
SPIROMETRY: 
Reduced FVC, reduced FEV1, reduced FEV1/FVC ratio. IMPRESSION:  Severe expiratory airflow limitation based on spirometry. Mahendra Gates MD 
 
 
[id number] 
D:  2020 12:48 
T:  2020 20:46 JOB #:  [id number]

## 2020-01-25 NOTE — PROGRESS NOTES
ID Progress Note 2020 Subjective: He states that he is feeling Irma Brown is getting some relief with the Klonopin Objective: Antibiotics: 1. Levaquin 2. Cefepime 3. Rifampin 4. Fluconazole 5. Vancomycin 6. Cefazolin 7. Zosyn 8. Vernadine Campi Vitals:  
Visit Vitals BP 91/72 (BP 1 Location: Left arm, BP Patient Position: At rest) Pulse (!) 101 Temp 97.8 °F (36.6 °C) Resp 18 Ht 6' 2\" (1.88 m) Wt 80 kg (176 lb 5.9 oz) SpO2 98% BMI 22.64 kg/m² Tmax:  Temp (24hrs), Av.8 °F (36.6 °C), Min:97.6 °F (36.4 °C), Max:98.1 °F (36.7 °C) Exam:  Lungs: A few basilar rales Heart:  regular rate and rhythm Abdomen:  soft, non-tender. Bowel sounds normal. No masses,  no organomegaly Urine in neal is grossly clear Sternal wound is dressed and dry Labs:     
Recent Labs  
  20 
0359 20 
1508 WBC 3.9*  --   
HGB 9.7*  --   
*  --   
BUN  --  18  
CREA  --  0.94  
SGOT  --  14* AP  --  115 TBILI  --  0.6 Cultures: No results found for: DANIELLE Lab Results Component Value Date/Time Culture result: LIGHT YEAST (A) 2020 10:01 AM  
 Culture result: LIGHT NORMAL RESPIRATORY TIAN 2020 10:01 AM  
 Culture result: NO GROWTH 5 DAYS 2020 09:52 AM  
 
 
Radiology:  
 
Line/Insert Date:        
 
Assessment:  
 
1. UTI--Serratia (2 biotypes) from culture and small amount of Enterococcus--now with Pseudomonas from culture of urine and blood 2. CHF/cardiomyopathy 3. ?aspiration event(s) 4. Renal insufficiency 5. Respiratory difficulties Objective: 1. Adjust antibiotic therapy 2. Presence of LVAD in face of bacteremia may require longer course of therapy, or at least PO Rx for a time 3. Work-up any fever 4. Follow-up pending cultures and studies Alia Ulrich MD

## 2020-01-25 NOTE — PROGRESS NOTES
[facility] VAD/Heart Failure Progress Note Admit Date: 10/25/2019 POD:  4 Days Post-Op Procedure:  Procedure(s): RIGHT HEART CATH Subjective:  
Mild dyspnea, fatigue, and weakness; ambulating better Objective:  
Vitals: 
Blood pressure 91/72, pulse 100, temperature 97.8 °F (36.6 °C), resp. rate 20, height 6' 2\" (1.88 m), weight 176 lb 5.9 oz (80 kg), SpO2 98 %. Temp (24hrs), Av.8 °F (36.6 °C), Min:97.6 °F (36.4 °C), Max:98.1 °F (36.7 °C) Hemodynamics: 
 CO: CO (l/min): 5.8 l/min CI: CI (l/min/m2): 2.8 l/min/m2 CVP: CVP (mmHg): 4 mmHg (19 1645) SVR: SVR (dyne*sec)/cm5: 1080 (dyne*sec)/cm5 (19 1200) PAP Systolic: PAP Systolic: 34 ( 2059) PAP Diastolic: PAP Diastolic: 13 ( 5718) PVR:   
 SV02: SVO2 (%): 67 % (19 1300) SCV02: SCVO2 (%): 75 % (10/29/19 1900) VAD Interrogation: LVAD (Heartmate) Pump Speed (RPM): 5800 Pump Flow (LPM): 5.2 Chatter in Lines: No 
PI (Pulsitility Index): 2.9 Power: 4.5 MAP: 80  Test: Yes 
Back Up  at Bedside & Labeled: Yes Power Module Test: Yes Driveline Site Care: No 
Driveline Dressing: Clean, Dry, and Intact EKG/Rhythm:   
 
Extubation Date / Time:  
 
CT Output:  
 
Ventilator: 
Ventilator Volumes Vt Set (ml): 550 ml (19 08) Vt Exhaled (Machine Breath) (ml): 639 ml (19 08) Vt Spont (ml): 437 ml (19 1035) Ve Observed (l/min): 7.25 l/min (19 1035) Oxygen Therapy: 
Oxygen Therapy O2 Sat (%): 98 % (20 1101) Pulse via Oximetry: 102 beats per minute (20 0704) O2 Device: Nasal cannula (20 1101) PEEP/CPAP (cm H2O): 1 cm H20 (20 1101) O2 Flow Rate (L/min): 1 l/min (20 1440) FIO2 (%): 21 % (20 0905) CXR: 
 
Admission Weight: Last Weight Weight: 192 lb 10.9 oz (87.4 kg) Weight: 176 lb 5.9 oz (80 kg) Intake / Output / Drain: 
Current Shift:  0701 -  190 In: 185 [P.O.:720; I.V.:200] Out: 725 [Urine:725] Last 24 hrs.:  
 
Intake/Output Summary (Last 24 hours) at 1/25/2020 1318 Last data filed at 1/25/2020 1101 Gross per 24 hour Intake 1260 ml Output 1325 ml Net -65 ml No results for input(s): CPK, CKMB, TROIQ in the last 72 hours. Recent Labs  
  01/24/20 
0359 01/24/20 0352 NA  --  138 K  --  4.4  
CO2  --  29 BUN  --  18  
CREA  --  0.94 GLU  --  91  
MG  --  2.1 WBC 3.9*  --   
HGB 9.7*  --   
HCT 32.5*  --   
*  --   
 
Recent Labs  
  01/24/20 0352 INR 2.1* PTP 20.7* No lab exists for component: PBNP Current Facility-Administered Medications:  
  sodium chloride tablet 1 g, 1 g, Oral, BID WITH MEALS, Miguel Francis MD 
  albuterol-ipratropium (DUO-NEB) 2.5 MG-0.5 MG/3 ML, 3 mL, Nebulization, BID RT, Miguel Francis MD 
  acetylcysteine (MUCOMYST) 200 mg/mL (20 %) solution 600 mg, 600 mg, Nebulization, BID RT, Miguel Francis MD 
  clonazePAM (KlonoPIN) tablet 0.5 mg, 0.5 mg, Oral, TID, Miguel Francis MD, 0.5 mg at 01/25/20 1219   warfarin (COUMADIN) tablet 1 mg, 1 mg, Oral, QPM, Levi, Shana B, NP, 1 mg at 01/24/20 1743   potassium chloride SR (KLOR-CON 10) tablet 10 mEq, 10 mEq, Oral, DAILY, Levi, Shana B, NP, 10 mEq at 01/25/20 0825   OLANZapine (ZyPREXA) tablet 5 mg, 5 mg, Oral, QPM, Javed Beck MD, 5 mg at 01/24/20 1742   albumin human 25% (BUMINATE) solution 12.5 g, 12.5 g, IntraVENous, BID, Levi, Shana B, NP, 12.5 g at 01/25/20 0826 
  sodium chloride (NS) flush 5-40 mL, 5-40 mL, IntraVENous, PRN, Reji Alexandre MD, 10 mL at 01/25/20 5378   naloxone [facility]) injection 0.4 mg, 0.4 mg, IntraVENous, PRN, Reji Alexandre MD 
  hydrALAZINE (APRESOLINE) 20 mg/mL injection 20 mg, 20 mg, IntraVENous, Q6H PRN, Shana Sims NP, 20 mg at 01/25/20 0407   clonazePAM (KlonoPIN) tablet 0.25 mg, 0.25 mg, Oral, QHS PRN, Ela Sims, TIERRA 
   epoetin yvonne-epbx (RETACRIT) injection 20,000 Units, 20,000 Units, SubCUTAneous, Q MON, WED & FRI, Levi, Shana B, NP, 20,000 Units at 01/24/20 2056   meropenem (MERREM) 500 mg in 0.9% sodium chloride (MBP/ADV) 50 mL, 0.5 g, IntraVENous, Q6H, Juan C Olivares MD, Last Rate: 100 mL/hr at 01/25/20 1051, 500 mg at 01/25/20 1051   levETIRAcetam (KEPPRA) tablet 250 mg, 250 mg, Oral, BID, Javed Beck MD, 250 mg at 01/25/20 0825 
  senna-docusate (PERICOLACE) 8.6-50 mg per tablet 1 Tab, 1 Tab, Oral, PRN, Angelia Agee MD, 1 Tab at 01/18/20 2465   collagenase (SANTYL) 250 unit/gram ointment, , Topical, DAILY, Levi, Shana B, NP 
  fludrocortisone (FLORINEF) tablet 0.1 mg, 0.1 mg, Oral, DAILY, Levi, Shana B, NP, 0.1 mg at 01/25/20 0825   cholecalciferol (VITAMIN D3) (1000 Units /25 mcg) tablet 2 Tab, 2,000 Units, Oral, DAILY, Polliard, Win Sill, NP, 2 Tab at 01/25/20 0825 
  venlafaxine-SR (EFFEXOR-XR) capsule 150 mg, 150 mg, Oral, DAILY WITH BREAKFAST, Polliard, Win Sill, NP, 150 mg at 01/25/20 0825   hydrocortisone (CORTAID) 1 % cream, , Topical, TID PRN, Jane Altman MD 
  thiamine HCL (B-1) tablet 100 mg, 100 mg, Oral, DAILY, Tenzin Solar E, NP, 100 mg at 01/25/20 0825 
  oxybutynin (DITROPAN) tablet 5 mg, 5 mg, Oral, Q6H PRN, Levi, Shana B, NP, 5 mg at 01/01/20 1204   magnesium oxide (MAG-OX) tablet 800 mg, 800 mg, Oral, BID, Tenzin Solar, NP, 800 mg at 01/25/20 2249   lidocaine (URO-JET/ GLYDO) 2 % jelly, , Urethral, PRN, Mounika Altman MD 
  albuterol-ipratropium (DUO-NEB) 2.5 MG-0.5 MG/3 ML, 3 mL, Nebulization, Q6H PRN, Xu Dong MD, 3 mL at 01/25/20 6037   therapeutic multivitamin (THERAGRAN) tablet 1 Tab, 1 Tab, Oral, DAILY, Shana Sims, NP, 1 Tab at 01/25/20 3262   alteplase (CATHFLO) 1 mg in sterile water (preservative free) 1 mL injection, 1 mg, InterCATHeter, PRN, [facility], Mounika QUEZADA MD, 1 mg at 01/20/20 1156   finasteride (PROSCAR) tablet 5 mg, 5 mg, Oral, DAILY, Mohan Rayo MD, 5 mg at 01/25/20 4394   diphenhydrAMINE (BENADRYL) capsule 25 mg, 25 mg, Oral, QHS PRN, Polliard, Duaine Hernán T, NP, 25 mg at 01/19/20 0044   octreotide (SANDOSTATIN) injection 25 mcg, 25 mcg, SubCUTAneous, TID, Polliard, Duaine Hernán T, NP, 25 mcg at 01/25/20 0826 
  pantoprazole (PROTONIX) tablet 40 mg, 40 mg, Oral, ACB, Polliard, Duaine Hernán T, NP, 40 mg at 01/25/20 8567   arformoterol (BROVANA) neb solution 15 mcg, 15 mcg, Nebulization, BID RT, 15 mcg at 01/25/20 0703 **AND** budesonide (PULMICORT) 500 mcg/2 ml nebulizer suspension, 500 mcg, Nebulization, BID RT, Polliard, Duaine Hernán T, NP, 500 mcg at 01/25/20 2012   lactobac ac& pc-s.therm-b.anim (TIAN Q/RISAQUAD), 1 Cap, Oral, DAILY, Giorgio Gilbert MD, 1 Cap at 01/25/20 0825 
  oxyCODONE IR (ROXICODONE) tablet 5 mg, 5 mg, Oral, Q6H PRN, Huan Rodriguez MD, 5 mg at 12/30/19 0405   tamsulosin (FLOMAX) capsule 0.4 mg, 0.4 mg, Oral, DAILY, Logan Kincaid MD, 0.4 mg at 01/25/20 0825   Warfarin MD/NP dosing, , Other, PRN, Mounika Altman MD 
  sodium chloride (NS) flush 5-40 mL, 5-40 mL, IntraVENous, Q8H, Huan Rodriguez MD, 10 mL at 01/24/20 2331   sodium chloride (NS) flush 5-40 mL, 5-40 mL, IntraVENous, PRN, Huan Rodriguez MD, 10 mL at 01/20/20 1326   acetaminophen (TYLENOL) tablet 650 mg, 650 mg, Oral, Q6H PRN, Huan Rodriguez MD, 650 mg at 01/21/20 1512 
  naloxone [facility]) injection 0.4 mg, 0.4 mg, IntraVENous, PRN, Huan Rodriguez MD 
  ondansetron [facility]) injection 4 mg, 4 mg, IntraVENous, Q4H PRN, Huan Rodriguez MD, 4 mg at 01/24/20 6078 
  sucralfate (CARAFATE) tablet 1 g, 1 g, Oral, AC&HS, Huan Rodriguez MD, 1 g at 01/25/20 1101 
  influenza vaccine 2019-20 (6 mos+)(PF) (FLUARIX/FLULAVAL/FLUZONE QUAD) injection 0.5 mL, 0.5 mL, IntraMUSCular, PRIOR TO DISCHARGE, Jasmina Altman MD 
 
  
A/P 
  
S/P LVAD - good flows Need for anti-coagulation - coumadin DM - insulin RV dysfunction - milrinone, diuretics  
  
Risk of morbidity and mortality - high Medical decision making - high complexity 
  
 
Signed By: Georgia Pugh MD

## 2020-01-25 NOTE — PROGRESS NOTES
[address] [facility] [address] [facility] in [place], [place] Inpatient Progress Note Patient name: Zay Wheeler Patient : 1950 Patient MRN: [id number] Attending MD: Homero Dance, MD 
Date of service: 20 CHIEF COMPLAINT: 
De azucena LVAD implant PLAN: 
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 Intolerant of ACEi/ARB/ARNI/AA due to JUAN J on CKD 3 Intolerant of spironolactone d/t IVVD Continue IV antibiotics per ID; need recommendations for oral antibiotic prophylaxis for DC Elevated cancer marker CEA in 2019, will repeat; needs chest PET CT to r/o lung cancer Still significant myoclonus; keppra with olanzepine not effective; will ask neurology for help Consider paraneoplastic syndrome with neurologic manifestations; cannot send anti-Hu (not on [facility] facility list) 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 
Chest CTA- no outflow graft occlusion Pet Scan equivocal for amyloid Send [employer]- pending HEMODYNAMICS: 
RHC not done CPEST not done 6MW not done OTHER IMAGING: 
Head CT negative for acute process EEG suggestive of mild generalized encephalopathic process, possibly related to underlying structural brain injury vs metabolic abnormality INTERVAL HISTORY: 
No issues overnight Hemodynamics at goal 
Somewhat dizzy when first stood up for PT, but then improved LVAD INTERROGATION: 
Device interrogated in person Device function normal, normal flow, no events LVAD Pump Speed (RPM): 5800 Pump Flow (LPM): 5.2 MAP: 88 
PI (Pulsitility Index): 2.9 Power: 4.5  Test: Yes 
Back Up  at Bedside & Labeled: Yes Power Module Test: Yes Driveline Site Care: No 
Driveline Dressing: Clean, Dry, and Intact Outpatient: No 
MAP in Therapeutic Range (Outpatient): No 
Testing Alarms Reviewed: Yes 
Back up SC speed: 5800 Back up Low Speed Limit: 5400 Emergency Equipment with Patient?: Yes Emergency procedures reviewed?: Yes Drive line site inspected?: Yes Drive line intergrity inspected?: Yes Drive line dressing changed?: No 
 
PHYSICAL EXAM: 
Visit Vitals BP 91/72 (BP 1 Location: Left arm, BP Patient Position: At rest) Pulse 100 Temp 97.8 °F (36.6 °C) Resp 20 Ht 6' 2\" (1.88 m) Wt 176 lb 5.9 oz (80 kg) SpO2 98% BMI 22.64 kg/m² General: Patient is well developed, well-nourished in no acute distress HEENT: Normocephalic and atraumatic. No scleral icterus. Pupils are equal, round and reactive to light and accomodation. No conjunctival injection. Oropharynx is clear. Neck: Supple. No evidence of thyroid enlargements or lymphadenopathy. JVD: Cannot be appreciated Lungs: Breath sounds are equal and clear bilaterally. No wheezes, rhonchi, or rales. Heart: Regular rate and rhythm with normal S1 and S2. No murmurs, gallops or rubs. Abdomen: Soft, no mass or tenderness. No organomegaly or hernia. Bowel sounds present. Genitourinary and rectal: deferred Extremities: No cyanosis, clubbing, or edema. Neurologic: No focal sensory or motor deficits are noted. Grossly intact. Psychiatric: Awake, alert an doriented x 3. Appropriate mood and affect. Skin: Warm, dry and well perfused. No lesions, nodules or rashes are noted. REVIEW OF SYSTEMS: 
General: Denies fever, night sweats. Ear, nose and throat: Denies difficulty hearing, sinus problems, runny nose, post-nasal drip, ringing in ears, mouth sores, loose teeth, ear pain, nosebleeds, sore throate, facial pain or numbess Cardiovascular: see above in the interval history Respiratory: Denies cough, wheezing, sputum production, hemoptysis. Gastrointestinal: Denies heartburn, constipation, intolerance to certain foods, diarrhea, abdominal pain, nausea, vomiting, difficulty swallowing, blood in stool Kidney and bladder: Denies painful urination, frequent urination, urgency, prostate problems and impotence Musculoskeletal: Denies joint pain, muscle weakness Skin and hair: Denies change in existing skin lesions, hair loss or increase, breast changes PAST MEDICAL HISTORY: 
Past Medical History:  
Diagnosis Date  Degenerative disc disease, lumbar  Heart failure ([address]  High cholesterol  Hypertension  Paroxysmal atrial fibrillation [address] [address] 4/2/2019  Spinal stenosis PAST SURGICAL HISTORY: 
Past Surgical History:  
Procedure Laterality Date  COLONOSCOPY Left 11/11/2019 COLONOSCOPY at bedside performed by Mari Hernández MD at [address]  HX CORONARY ARTERY BYPASS GRAFT    
 triple  HX HERNIA REPAIR    
 HX IMPLANTABLE CARDIOVERTER DEFIBRILLATOR    
  DC CARDIOVERSION ELECTIVE ARRHYTHMIA EXTERNAL N/A 6/10/2019 EP CARDIOVERSION performed by Caridad Moralez MD at [address], [facility] CATH LAB  DC CARDIOVERSION ELECTIVE ARRHYTHMIA EXTERNAL N/A 2019 EP CARDIOVERSION performed by Zeke Maldonado MD at [address] [address], [facility] [address] CATH LAB  DC INSJ/RPLCMT PERM DFB W/TRNSVNS LDS 1/DUAL CHMBR N/A 2019 INSERT ICD BIV MULTI performed by Shona Anderson MD at [address], [facility] CATH LAB  DC TCAT IMPL WRLS P-ART PRS SNR L-T HEMODYN MNTR N/A 2019 IMPLANT HEART FAILURE MONITORING DEVICE performed by Vicky Chamorro MD at [address] [facility] [address], [facility] [address] CATH LAB FAMILY HISTORY: 
Family History Problem Relation Age of Onset  Lung Disease Mother  Hypertension Mother Ellen Putnam Arthritis-osteo Mother  Heart Disease Mother  Heart Disease Father  Diabetes Father SOCIAL HISTORY: 
Social History Socioeconomic History  Marital status:  Spouse name: Not on file  Number of children: Not on file  Years of education: Not on file  Highest education level: Not on file Tobacco Use  Smoking status: Former Smoker Last attempt to quit: 2010 Years since quittin.1  Smokeless tobacco: Never Used Substance and Sexual Activity  Alcohol use: Not Currently Comment: rarely  Drug use: Never Other Topics Concern LABORATORY RESULTS: 
  
Labs Latest Ref Rng & Units 2020 WBC 4.1 - 11.1 K/uL 3. 9(L) 4.2 3.6(L) 3. 3(L) 2. 6(L) 2. 5(L) 2. 4(L)  
RBC 4.10 - 5.70 M/uL 3.30(L) 3.35(L) 3.42(L) 3.32(L) 3.11(L) 3.25(L) 3.22(L) Hemoglobin 12.1 - 17.0 g/dL 9.7(L) 9.8(L) 10. 1(L) 9.8(L) 9.2(L) 9.5(L) 9.5(L) Hematocrit 36.6 - 50.3 % 32. 5(L) 33. 0(L) 32. 7(L) 32. 2(L) 29. 5(L) 30. 8(L) 30. 5(L) MCV 80.0 - 99.0 FL 98.5 98.5 95.6 97.0 94.9 94.8 94.7 Platelets 360 - 607 K/uL 124(L) 109(L) 89(L) 69(L) 68(L) 78(L) 83(L) Lymphocytes 12 - 49 % - - 8(L) 11(L) 19 10(L) -  
 Monocytes 5 - 13 % - - 9 5 12 6 - Eosinophils 0 - 7 % - - 0 2 4 2 - Basophils 0 - 1 % - - 1 0 1 0 - Bands 0 - 6 % - - 0 2 2 3 - Blasts 0 % - - 0 0 0 0 - Albumin 3.5 - 5.0 g/dL 2. 3(L) 2. 2(L) 2. 2(L) 2. 1(L) 2. 1(L) 2. 2(L) 2. 0(L) Calcium 8.5 - 10.1 MG/DL 8.7 9.0 8.9 8.6 8.4(L) 8.4(L) 8. 1(L) SGOT 15 - 37 U/L 14(L) 18 21 29 31 38(H) 57(H) Glucose 65 - 100 mg/dL 91 88 93 105(H) 92 103(H) 114(H) BUN 6 - 20 MG/DL 18 23(H) 25(H) 25(H) 25(H) 23(H) 23(H) Creatinine 0.70 - 1.30 MG/DL 0.94 1.06 1.05 1.00 0.99 0.97 1.02 Sodium 136 - 145 mmol/L 138 137 137 134(L) 133(L) 131(L) 132(L) Potassium 3.5 - 5.1 mmol/L 4.4 4.9 4.4 3.9 4.1 3.8 3. 2(L) TSH 0.36 - 3.74 uIU/mL - - - - - - -  
LDH 85 - 241 U/L 170 204 245(H) 274(H) 251(H) 252(H) 253(H) Some recent data might be hidden Lab Results Component Value Date/Time TSH 2.30 12/03/2019 03:29 AM  
 TSH 2.40 10/25/2019 07:39 PM  
 TSH 2.45 06/01/2019 04:16 AM  
 
 
ALLERGY: 
No Known Allergies CURRENT MEDICATIONS: 
 
Current Facility-Administered Medications:  
  warfarin (COUMADIN) tablet 1 mg, 1 mg, Oral, QPM, Levi, Shana B, NP, 1 mg at 01/24/20 1743   sodium chloride tablet 1 g, 1 g, Oral, DAILY, Levi, Shana B, NP, 1 g at 01/25/20 0825   potassium chloride SR (KLOR-CON 10) tablet 10 mEq, 10 mEq, Oral, DAILY, Levi, Shana B, NP, 10 mEq at 01/25/20 0825   OLANZapine (ZyPREXA) tablet 5 mg, 5 mg, Oral, QPM, Javed Beck MD, 5 mg at 01/24/20 1742   albumin human 25% (BUMINATE) solution 12.5 g, 12.5 g, IntraVENous, BID, Levi, Shana B, NP, 12.5 g at 01/25/20 9424   acetylcysteine (MUCOMYST) 200 mg/mL (20 %) solution 600 mg, 600 mg, Nebulization, TID RT, FiserTracee MD, 600 mg at 01/25/20 2165   sodium chloride (NS) flush 5-40 mL, 5-40 mL, IntraVENous, PRN, Reji Alexandre MD, 10 mL at 01/25/20 3708   naloxone (NARCAN) injection 0.4 mg, 0.4 mg, IntraVENous, PRN, Reji Alexandre MD 
   hydrALAZINE (APRESOLINE) 20 mg/mL injection 20 mg, 20 mg, IntraVENous, Q6H PRN, Levi, Shana B, NP, 20 mg at 01/25/20 0407   clonazePAM (KlonoPIN) tablet 0.25 mg, 0.25 mg, Oral, QHS PRN, Levi, Shana B, NP 
  epoetin yvonne-epbx (RETACRIT) injection 20,000 Units, 20,000 Units, SubCUTAneous, Q MON, WED & FRI, Levi, Shana B, NP, 20,000 Units at 01/24/20 2056   meropenem (MERREM) 500 mg in 0.9% sodium chloride (MBP/ADV) 50 mL, 0.5 g, IntraVENous, Q6H, Juan C Olivares MD, Last Rate: 100 mL/hr at 01/25/20 1051, 500 mg at 01/25/20 1051   levETIRAcetam (KEPPRA) tablet 250 mg, 250 mg, Oral, BID, Javed Beck MD, 250 mg at 01/25/20 0825 
  senna-docusate (PERICOLACE) 8.6-50 mg per tablet 1 Tab, 1 Tab, Oral, PRN, Verónica Sevilla MD, 1 Tab at 01/18/20 6419   collagenase (SANTYL) 250 unit/gram ointment, , Topical, DAILY, Levi, Shana B, NP 
  fludrocortisone (FLORINEF) tablet 0.1 mg, 0.1 mg, Oral, DAILY, Alexi Simsin B, NP, 0.1 mg at 01/25/20 0825   cholecalciferol (VITAMIN D3) (1000 Units /25 mcg) tablet 2 Tab, 2,000 Units, Oral, DAILY, Polliard, Jessica Frey, NP, 2 Tab at 01/25/20 0825 
  venlafaxine-SR (EFFEXOR-XR) capsule 150 mg, 150 mg, Oral, DAILY WITH BREAKFAST, Polliard, Jessica Frey, NP, 150 mg at 01/25/20 0825   hydrocortisone (CORTAID) 1 % cream, , Topical, TID PRN, Amadou Altman MD 
  thiamine HCL (B-1) tablet 100 mg, 100 mg, Oral, DAILY, Terese Robles E, NP, 100 mg at 01/25/20 0825 
  oxybutynin (DITROPAN) tablet 5 mg, 5 mg, Oral, Q6H PRN, Shana Sims NP, 5 mg at 01/01/20 1204   magnesium oxide (MAG-OX) tablet 800 mg, 800 mg, Oral, BID, Terese Robles NP, 800 mg at 01/25/20 4528   lidocaine (URO-JET/ GLYDO) 2 % jelly, , Urethral, PRN, Mounika Altman MD 
  albuterol-ipratropium (DUO-NEB) 2.5 MG-0.5 MG/3 ML, 3 mL, Nebulization, Q6H PRN, Eris Mari MD, 3 mL at 01/25/20 0240   therapeutic multivitamin (THERAGRAN) tablet 1 Tab, 1 Tab, Oral, DAILY, Shana Sims NP, 1 Tab at 01/25/20 7774   alteplase (CATHFLO) 1 mg in sterile water (preservative free) 1 mL injection, 1 mg, InterCATHeter, PRN, Pegge Mounika Turner MD, 1 mg at 01/20/20 1156   finasteride (PROSCAR) tablet 5 mg, 5 mg, Oral, DAILY, Staci Cerda MD, 5 mg at 01/25/20 6080   diphenhydrAMINE (BENADRYL) capsule 25 mg, 25 mg, Oral, QHS PRN, PolliarJeremie small NP, 25 mg at 01/19/20 0044   octreotide (SANDOSTATIN) injection 25 mcg, 25 mcg, SubCUTAneous, TID, PollJeremie capone, NP, 25 mcg at 01/25/20 0826 
  pantoprazole (PROTONIX) tablet 40 mg, 40 mg, Oral, ACB, Jeremie Herrera, NP, 40 mg at 01/25/20 6519   arformoterol (BROVANA) neb solution 15 mcg, 15 mcg, Nebulization, BID RT, 15 mcg at 01/25/20 0703 **AND** budesonide (PULMICORT) 500 mcg/2 ml nebulizer suspension, 500 mcg, Nebulization, BID RT, Jeremie Herrera, NP, 500 mcg at 01/25/20 1550   lactobac ac& pc-s.therm-b.anim (TIAN Q/RISAQUAD), 1 Cap, Oral, DAILY, Benita Live MD, 1 Cap at 01/25/20 0825 
  oxyCODONE IR (ROXICODONE) tablet 5 mg, 5 mg, Oral, Q6H PRN, Mitchel Mccord MD, 5 mg at 12/30/19 0405   tamsulosin (FLOMAX) capsule 0.4 mg, 0.4 mg, Oral, DAILY, Logan Kincaid MD, 0.4 mg at 01/25/20 0825   Warfarin MD/NP dosing, , Other, PRN, Mounika Altman MD 
  sodium chloride (NS) flush 5-40 mL, 5-40 mL, IntraVENous, Q8H, Mitchel Mccord MD, 10 mL at 01/24/20 2331   sodium chloride (NS) flush 5-40 mL, 5-40 mL, IntraVENous, PRN, Mitchel Mccord MD, 10 mL at 01/20/20 1326   acetaminophen (TYLENOL) tablet 650 mg, 650 mg, Oral, Q6H PRN, Mitchel Mccord MD, 650 mg at 01/21/20 1512 
  naloxone [facility]) injection 0.4 mg, 0.4 mg, IntraVENous, PRN, Mitchel Mccord MD 
  ondansetron [facility]) injection 4 mg, 4 mg, IntraVENous, Q4H PRN, Mitchel Mccord MD, 4 mg at 01/24/20 1062   sucralfate (CARAFATE) tablet 1 g, 1 g, Oral, AC&HS, Carina Katz MD, 1 g at 01/25/20 1101 
  influenza vaccine 2019-20 (6 mos+)(PF) (FLUARIX/FLULAVAL/FLUZONE QUAD) injection 0.5 mL, 0.5 mL, IntraMUSCular, PRIOR TO DISCHARGE, Ezio Altman MD 
 
Thank you for allowing me to participate in this patient's care. Pepper Costello MD PhD 
[facility] [address], [address] Phone: [phone] Fax: [phone]

## 2020-01-25 NOTE — PROGRESS NOTES
0730:  Bedside shift change report given to Eun Garcia RN (oncoming nurse) by Kristie Barajas RN (offgoing nurse). Report included the following information SBAR, Kardex, Procedure Summary, Intake/Output, MAR, and Recent Results. 0800:  Patient c/o lethargy and weakness this am, requesting condom catheter due to frequency of urine occurrences. Excoriation/irritation previously present in david area improved/gone - condom catheter placed for patient comfort. Patient declines to get in chair this morning due to \"feeling weak\", but \"I really want to try later\". 1219:  New order of clonazepam given for tremors. 1400:  Patient appears to have improved mood, states \"I feel so much better, and have less tremors\". Patient sitting up in bed eating lunch. 1500:  Patient had bowel movement, sat at the edge of the bed with no c/o dizziness. Donned vest and switched from power to batteries independently. Gait belt placed and patient walked 150 feet around nurses station with 2 RNs and someone following with a chair behind. Patient took multiple rest breaks but experienced no dizziness. [id number]:  Patient sat in recliner. 1715:  Patient ambulated 50 feet with gait belt, walker, 2 RNs and chair following him. Tolerated well with no dizziness. Sat on the edge of the bed and switched from battery to power independently. Doffed vest independently. Repositioned in bed. 1930:  Bedside shift change report given to Melvin Turk RN (oncoming nurse) by Eun Garcia RN (offgoing nurse). Report included the following information SBAR, Kardex, Procedure Summary, Intake/Output, MAR and Recent Results. Problem: Falls - Risk of 
Goal: *Absence of Falls Description Document Joshua Rao Fall Risk and appropriate interventions in the flowsheet. Outcome: Progressing Towards Goal 
Note: Fall Risk Interventions: 
Mobility Interventions: Communicate number of staff needed for ambulation/transfer, Patient to call before getting OOB Mentation Interventions: Adequate sleep, hydration, pain control Medication Interventions: Teach patient to arise slowly, Patient to call before getting OOB Elimination Interventions: Call light in reach, Patient to call for help with toileting needs History of Falls Interventions: Consult care management for discharge planning, Evaluate medications/consider consulting pharmacy, Room close to nurse's station Problem: Pain Goal: *Control of Pain Outcome: Progressing Towards Goal 
  
Problem: Pressure Injury - Risk of 
Goal: *Prevention of pressure injury Description Document Mich Scale and appropriate interventions in the flowsheet. Offload heels Turn approximately every 2 hours Outcome: Progressing Towards Goal 
Note: Pressure Injury Interventions: 
Sensory Interventions: Assess changes in LOC, Chair cushion, Minimize linen layers, Pressure redistribution bed/mattress (bed type) Moisture Interventions: Absorbent underpads, Assess need for specialty bed, Minimize layers, Moisture barrier Activity Interventions: Increase time out of bed, Pressure redistribution bed/mattress(bed type) Mobility Interventions: HOB 30 degrees or less, Pressure redistribution bed/mattress (bed type) Nutrition Interventions: Document food/fluid/supplement intake, Discuss nutritional consult with provider, Offer support with meals,snacks and hydration Friction and Shear Interventions: Lift sheet, HOB 30 degrees or less, Foam dressings/transparent film/skin sealants, Transferring/repositioning devices Problem: Heart Failure: Discharge Outcomes Goal: *Demonstrates ability to perform prescribed activity without shortness of breath or discomfort Outcome: Progressing Towards Goal 
  
Problem: LVAD Post-op Day 15 to Discharge Goal: Nutrition/Diet Outcome: Progressing Towards Goal 
Goal: Medications Outcome: Progressing Towards Goal 
Goal: Treatments/Interventions/Procedures Outcome: Progressing Towards Goal 
  
Problem: Impaired Skin Integrity/Pressure Injury Treatment Goal: *Improvement of Existing Pressure Injury Outcome: Progressing Towards Goal

## 2020-01-25 NOTE — PROGRESS NOTES
ADVANCED HEART FAILURE ADDENDUM Discussed with Dr. Mirela Putnam with neurology service. Will add klonopin 0.5mg TID and uptitrate for myoclonus.  
 
Isauro Parson MD

## 2020-01-26 NOTE — PROGRESS NOTES
0730:  Bedside shift change report given to Isabell Bajwa RN (oncoming nurse) by Joi Johnson RN (offgoing nurse). Report included the following information SBAR, Kardex, Procedure Summary, Intake/Output, MAR, and Recent Results. 0800:  Patient received in chair, lethargic but arousable. 1030:  Patient ambulated 150 feet with 3 RNs, walker, and 4 rest breaks. Tolerated well with no c/o of dizziness. Patient returned to bed and performed switch over from battery to power independently. 1410:  Orthostatic Maps complete - see note. Patient donned vest, switched from power to batteries independently. Assisted to chair. Much more awake and alert, but increased tremors. [id number]:  Patient up in chair. 1600:  Patient ambulated 25 feet with 3 RNs, walker, and chair following. Patient c/o \"weakness\" and assisted to chair and rolled back to room, assisted to bed. MAP 74. Patient states he is \"disappointed\" but \"feels ok\". 1930:  Bedside shift change report given to Joi Johnson RN (oncoming nurse) by Isabell Bajwa RN (offgoing nurse). Report included the following information SBAR, Kardex, Procedure Summary, Intake/Output, MAR and Recent Results. Problem: Falls - Risk of 
Goal: *Absence of Falls Description Document Edgar Brunner Fall Risk and appropriate interventions in the flowsheet. Outcome: Progressing Towards Goal 
Note: Fall Risk Interventions: 
Mobility Interventions: Patient to call before getting OOB Mentation Interventions: Adequate sleep, hydration, pain control Medication Interventions: Teach patient to arise slowly, Utilize gait belt for transfers/ambulation, Patient to call before getting OOB Elimination Interventions: Call light in reach, Patient to call for help with toileting needs History of Falls Interventions: Consult care management for discharge planning, Evaluate medications/consider consulting pharmacy, Room close to nurse's station Problem: Pain Goal: *Control of Pain Outcome: Progressing Towards Goal 
  
Problem: Pressure Injury - Risk of 
Goal: *Prevention of pressure injury Description Document Mich Scale and appropriate interventions in the flowsheet. Offload heels Turn approximately every 2 hours Outcome: Progressing Towards Goal 
Note: Pressure Injury Interventions: 
Sensory Interventions: Assess changes in LOC, Chair cushion, Minimize linen layers, Pressure redistribution bed/mattress (bed type) Moisture Interventions: Absorbent underpads, Assess need for specialty bed, Minimize layers, Moisture barrier Activity Interventions: Increase time out of bed, Pressure redistribution bed/mattress(bed type) Mobility Interventions: HOB 30 degrees or less, Pressure redistribution bed/mattress (bed type) Nutrition Interventions: Document food/fluid/supplement intake, Discuss nutritional consult with provider, Offer support with meals,snacks and hydration Friction and Shear Interventions: Lift sheet, HOB 30 degrees or less, Foam dressings/transparent film/skin sealants, Transferring/repositioning devices Problem: Heart Failure: Discharge Outcomes Goal: *Describes/verbalizes understanding of follow-up/return appt Description 
(eg: to physicians, diabetes treatment coordinator, and other resources Outcome: Progressing Towards Goal 
Goal: *Describes importance of continuing daily weights and changes to report to physician Outcome: Progressing Towards Goal 
  
Problem: LVAD Post-op Day 15 to Discharge Goal: Diagnostic Test/Procedures Outcome: Progressing Towards Goal 
Goal: Respiratory Outcome: Progressing Towards Goal 
  
Problem: Impaired Skin Integrity/Pressure Injury Treatment Goal: *Improvement of Existing Pressure Injury Outcome: Progressing Towards Goal

## 2020-01-26 NOTE — PROGRESS NOTES
01/26/20 1412 01/26/20 1413 01/26/20 1414 Vital Signs Pulse (Heart Rate) 88 (!) 110 (!) 102 MAP (Monitor) 86 80 72 BP 1 Method Doppler Doppler Doppler BP 1 Location Left arm Left arm Left arm BP Patient Position Supine Sitting Standing

## 2020-01-26 NOTE — PROGRESS NOTES
[address] [facility] in [place], [facility] Inpatient Progress Note Patient name: Justice Weems Patient : 1950 Patient MRN: [id number] Attending MD: Kendall Govea MD 
Date of service: 20 CHIEF COMPLAINT: 
De azucena LVAD implant 
  
PLAN: 
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 Intolerant of ACEi/ARB/ARNI/AA due to JUAN J on CKD 3 Intolerant of spironolactone d/t IVVD Continue IV antibiotics per ID; need recommendations for PO abx prophylaxis for DC Elevated cancer marker CEA in 2019, needs chest PET CT to r/o lung cancer Significant relief from klonopin 0.5mg TID; cannot increase dose due to sedative effect; will discontinue olanzepine; plan to wean keppra once symptoms improved with klonopin; neurology consult greatly appreciated D/w neurology to workup paraneoplastic syndrome with neurologic manifestations if cancer identified or symptoms not controlled; anti-Hu ab not on [address] [place] [address] facility list 
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 Christin Manriquez would like to pull sternal wire outpatient Continue physical therapy Nutritional consult; persistently low prealbumin Tentative time to rehabilitation on Monday; availability of beds in  rehabilitation may potentially delay discharge  
  
IMPRESSION: 
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 
  
CARDIAC IMAGING: 
Chest CTA- no outflow graft occlusion Pet Scan equivocal for amyloid Send [employer]- pending  
  
HEMODYNAMICS: 
RHC not done CPEST not done 6MW not done 
  
OTHER IMAGING: 
Head CT negative for acute process EEG suggestive of mild generalized encephalopathic process, possibly related to underlying structural brain injury vs metabolic abnormality 
  
INTERVAL HISTORY: 
No issues overnight Hemodynamics at goal, MAP 80-90s, HR 70s Somewhat dizzy when first stood up for PT, but then improved I/O inaccurate Weight 177lbs INR 1.8 
CEA 5.4 Prealbumi 16.9 LVAD INTERROGATION: 
Device interrogated in person Device function normal, normal flow, no events LVAD Pump Speed (RPM): 5800 Pump Flow (LPM): 4.4 MAP: 78 
PI (Pulsitility Index): 5.4 Power: 4.4  Test: Yes 
Back Up  at Bedside & Labeled: Yes Power Module Test: Yes Driveline Site Care: No 
Driveline Dressing: Clean, Dry, and Intact Outpatient: No 
MAP in Therapeutic Range (Outpatient): No 
Testing Alarms Reviewed: Yes 
Back up SC speed: 5800 Back up Low Speed Limit: 5400 Emergency Equipment with Patient?: Yes Emergency procedures reviewed?: Yes Drive line site inspected?: Yes Drive line intergrity inspected?: Yes Drive line dressing changed?: No 
 
PHYSICAL EXAM: 
Visit Vitals BP 91/72 (BP 1 Location: Left arm, BP Patient Position: At rest) Pulse 72 Temp 97.8 °F (36.6 °C) Resp 18 Ht 6' 2\" (1.88 m) Wt 177 lb 11.1 oz (80.6 kg) SpO2 91% BMI 22.81 kg/m² General: Patient is well developed, well-nourished in no acute distress HEENT: Normocephalic and atraumatic. No scleral icterus. Pupils are equal, round and reactive to light and accomodation. No conjunctival injection. Oropharynx is clear. Neck: Supple. No evidence of thyroid enlargements or lymphadenopathy. JVD: Cannot be appreciated Lungs: Breath sounds are equal and clear bilaterally. No wheezes, rhonchi, or rales. Heart: Regular rate and rhythm with normal S1 and S2. No murmurs, gallops or rubs. Abdomen: Soft, no mass or tenderness. No organomegaly or hernia. Bowel sounds present. Genitourinary and rectal: deferred Extremities: No cyanosis, clubbing, or edema. Neurologic: No focal sensory or motor deficits are noted. Grossly intact. Psychiatric: Awake, alert an doriented x 3. Appropriate mood and affect. Skin: Warm, dry and well perfused. No lesions, nodules or rashes are noted. REVIEW OF SYSTEMS: 
General: Denies fever, night sweats. Ear, nose and throat: Denies difficulty hearing, sinus problems, runny nose, post-nasal drip, ringing in ears, mouth sores, loose teeth, ear pain, nosebleeds, sore throate, facial pain or numbess Cardiovascular: see above in the interval history Respiratory: Denies cough, wheezing, sputum production, hemoptysis. Gastrointestinal: Denies heartburn, constipation, intolerance to certain foods, diarrhea, abdominal pain, nausea, vomiting, difficulty swallowing, blood in stool Kidney and bladder: Denies painful urination, frequent urination, urgency, prostate problems and impotence Musculoskeletal: Denies joint pain, muscle weakness Skin and hair: Denies change in existing skin lesions, hair loss or increase, breast changes PAST MEDICAL HISTORY: 
Past Medical History:  
Diagnosis Date  Degenerative disc disease, lumbar  Heart failure ([address]  High cholesterol  Hypertension  Paroxysmal atrial fibrillation [address] [address] 2019  Spinal stenosis PAST SURGICAL HISTORY: 
Past Surgical History:  
Procedure Laterality Date  COLONOSCOPY Left 2019 COLONOSCOPY at bedside performed by Randall Hawthorne MD at [address]  HX CORONARY ARTERY BYPASS GRAFT    
 triple  HX HERNIA REPAIR    
 HX IMPLANTABLE CARDIOVERTER DEFIBRILLATOR  SC CARDIOVERSION ELECTIVE ARRHYTHMIA EXTERNAL N/A 6/10/2019 EP CARDIOVERSION performed by Umang Tierney MD at [place] [address], [facility] [address] CATH LAB  SC CARDIOVERSION ELECTIVE ARRHYTHMIA EXTERNAL N/A 2019 EP CARDIOVERSION performed by Martha Goss MD at [place] [address], [facility] [address] CATH LAB  SC INSJ/RPLCMT PERM DFB W/TRNSVNS LDS 1/DUAL CHMBR N/A 2019 INSERT ICD BIV MULTI performed by Ron Peralta MD at [place] [address], [facility] CATH LAB  SC TCAT IMPL WRLS P-ART PRS SNR L-T HEMODYN MNTR N/A 2019 IMPLANT HEART FAILURE MONITORING DEVICE performed by Shabbir Shah MD at [place] [address], [facility] [address] CATH LAB FAMILY HISTORY: 
Family History Problem Relation Age of Onset  Lung Disease Mother  Hypertension Mother Canseco Arthritis-osteo Mother  Heart Disease Mother  Heart Disease Father  Diabetes Father SOCIAL HISTORY: 
Social History Socioeconomic History  Marital status:  Spouse name: Not on file  Number of children: Not on file  Years of education: Not on file  Highest education level: Not on file Tobacco Use  Smoking status: Former Smoker Last attempt to quit: 2010 Years since quittin.1  Smokeless tobacco: Never Used Substance and Sexual Activity  Alcohol use: Not Currently Comment: rarely  Drug use: Never Other Topics Concern LABORATORY RESULTS: 
  
Labs Latest Ref Rng & Units 2020 WBC 4.1 - 11.1 K/uL 3.4(L) 3. 9(L) 4.2 3.6(L) 3. 3(L) 2. 6(L) 2. 5(L) RBC 4.10 - 5.70 M/uL 3.34(L) 3.30(L) 3.35(L) 3.42(L) 3.32(L) 3.11(L) 3.25(L) Hemoglobin 12.1 - 17.0 g/dL 9.8(L) 9.7(L) 9.8(L) 10. 1(L) 9.8(L) 9.2(L) 9.5(L) Hematocrit 36.6 - 50.3 % 33. 1(L) 32. 5(L) 33. 0(L) 32. 7(L) 32. 2(L) 29. 5(L) 30. 8(L) MCV 80.0 - 99.0 FL 99. 1(H) 98.5 98.5 95.6 97.0 94.9 94.8 Platelets 072 - 726 K/uL 136(L) 124(L) 109(L) 89(L) 69(L) 68(L) 78(L) Lymphocytes 12 - 49 % - - - 8(L) 11(L) 19 10(L) Monocytes 5 - 13 % - - - 9 5 12 6 Eosinophils 0 - 7 % - - - 0 2 4 2 Basophils 0 - 1 % - - - 1 0 1 0 Bands 0 - 6 % - - - 0 2 2 3 Blasts 0 % - - - 0 0 0 0 Albumin 3.5 - 5.0 g/dL 2. 6(L) 2. 3(L) 2. 2(L) 2. 2(L) 2. 1(L) 2. 1(L) 2. 2(L) Calcium 8.5 - 10.1 MG/DL 8.7 8.7 9.0 8.9 8.6 8.4(L) 8.4(L) SGOT 15 - 37 U/L 15 14(L) 18 21 29 31 38(H) Glucose 65 - 100 mg/dL 94 91 88 93 105(H) 92 103(H) BUN 6 - 20 MG/DL 18 18 23(H) 25(H) 25(H) 25(H) 23(H) Creatinine 0.70 - 1.30 MG/DL 1.03 0.94 1.06 1.05 1.00 0.99 0.97 Sodium 136 - 145 mmol/L 140 138 137 137 134(L) 133(L) 131(L) Potassium 3.5 - 5.1 mmol/L 4.3 4.4 4.9 4.4 3.9 4.1 3.8 TSH 0.36 - 3.74 uIU/mL - - - - - - -  
LDH 85 - 241 U/L 174 170 204 245(H) 274(H) 251(H) 252(H) CEA ng/mL 5.4 - - - - - - Some recent data might be hidden Lab Results Component Value Date/Time TSH 2.30 12/03/2019 03:29 AM  
 TSH 2.40 10/25/2019 07:39 PM  
 TSH 2.45 06/01/2019 04:16 AM  
 
 
ALLERGY: 
No Known Allergies CURRENT MEDICATIONS: 
 
Current Facility-Administered Medications:  
  sodium chloride tablet 1 g, 1 g, Oral, BID WITH MEALS, Andrey Mari MD, 1 g at 01/26/20 0801 
  albuterol-ipratropium (DUO-NEB) 2.5 MG-0.5 MG/3 ML, 3 mL, Nebulization, BID RT, Andrey Mari MD, Stopped at 01/25/20 2100   acetylcysteine (MUCOMYST) 200 mg/mL (20 %) solution 600 mg, 600 mg, Nebulization, BID RT, Andrey Mari MD, 600 mg at 01/26/20 2084   clonazePAM (KlonoPIN) tablet 0.5 mg, 0.5 mg, Oral, TID, Andrey Mari MD, 0.5 mg at 01/26/20 0800   warfarin (COUMADIN) tablet 1 mg, 1 mg, Oral, QPM, Levi, Shana B, NP, 1 mg at 01/25/20 1709   potassium chloride SR (KLOR-CON 10) tablet 10 mEq, 10 mEq, Oral, DAILY, Levi, Shana B, NP, 10 mEq at 01/26/20 8576   OLANZapine (ZyPREXA) tablet 5 mg, 5 mg, Oral, QPM, Javed Beck MD, 5 mg at 01/25/20 1709 
  albumin human 25% (BUMINATE) solution 12.5 g, 12.5 g, IntraVENous, BID, Levi, Shana B, NP, 12.5 g at 01/26/20 0801 
  sodium chloride (NS) flush 5-40 mL, 5-40 mL, IntraVENous, PRN, Carrie Maldonado MD, 10 mL at 01/25/20 4292   naloxone [facility]) injection 0.4 mg, 0.4 mg, IntraVENous, PRN, Carrie Maldonado MD 
  hydrALAZINE (APRESOLINE) 20 mg/mL injection 20 mg, 20 mg, IntraVENous, Q6H PRN, Levi Shana B, NP, 20 mg at 01/25/20 0407   clonazePAM (KlonoPIN) tablet 0.25 mg, 0.25 mg, Oral, QHS PRN, Levi Shana B, NP 
  epoetin yvonne-epbx (RETACRIT) injection 20,000 Units, 20,000 Units, SubCUTAneous, Q MON, WED & FRI, Levi Shana B, NP, 20,000 Units at 01/24/20 2056   meropenem (MERREM) 500 mg in 0.9% sodium chloride (MBP/ADV) 50 mL, 0.5 g, IntraVENous, Q6H, Catrina Avendano MD, Last Rate: 100 mL/hr at 01/26/20 0639, 500 mg at 01/26/20 9445   levETIRAcetam (KEPPRA) tablet 250 mg, 250 mg, Oral, BID, Javed Beck MD, 250 mg at 01/26/20 0801 
  senna-docusate (PERICOLACE) 8.6-50 mg per tablet 1 Tab, 1 Tab, Oral, PRN, Raymondo Jitendra, MD, 1 Tab at 01/18/20 6661   collagenase (SANTYL) 250 unit/gram ointment, , Topical, DAILY, Levi, Shana B, NP 
  fludrocortisone (FLORINEF) tablet 0.1 mg, 0.1 mg, Oral, DAILY, Levi, Shana B, NP, 0.1 mg at 01/26/20 0801   cholecalciferol (VITAMIN D3) (1000 Units /25 mcg) tablet 2 Tab, 2,000 Units, Oral, DAILY, Janae Herrera NP, 2 Tab at 01/26/20 0800 
  venlafaxine-SR (EFFEXOR-XR) capsule 150 mg, 150 mg, Oral, DAILY WITH BREAKFAST, Janae Herrera NP, 150 mg at 01/26/20 0800   hydrocortisone (CORTAID) 1 % cream, , Topical, TID PRN, Olegario Serra MD 
  thiamine HCL (B-1) tablet 100 mg, 100 mg, Oral, DAILY, Cuco ACUNA, TIERRA, 100 mg at 01/26/20 0800 
  oxybutynin (DITROPAN) tablet 5 mg, 5 mg, Oral, Q6H PRN, Alexi Simsin B, NP, 5 mg at 01/01/20 1204   magnesium oxide (MAG-OX) tablet 800 mg, 800 mg, Oral, BID, Cuco Vásquez NP, 800 mg at 01/26/20 4555   lidocaine (URO-JET/ GLYDO) 2 % jelly, , Urethral, PRN, Mounika Altman MD 
  albuterol-ipratropium (DUO-NEB) 2.5 MG-0.5 MG/3 ML, 3 mL, Nebulization, Q6H PRN, Marilee Reveles MD, 3 mL at 01/25/20 1927   therapeutic multivitamin (THERAGRAN) tablet 1 Tab, 1 Tab, Oral, DAILY, Levi, Shana B, NP, 1 Tab at 01/26/20 3784   alteplase (CATHFLO) 1 mg in sterile water (preservative free) 1 mL injection, 1 mg, InterCATHeter, PRN, Mounika Serra MD, 1 mg at 01/20/20 1156   finasteride (PROSCAR) tablet 5 mg, 5 mg, Oral, DAILY, Bryon Blackwood MD, 5 mg at 01/26/20 6909   diphenhydrAMINE (BENADRYL) capsule 25 mg, 25 mg, Oral, QHS PRN, Polliard, Rosellen Stef T, NP, 25 mg at 01/19/20 0044   octreotide (SANDOSTATIN) injection 25 mcg, 25 mcg, SubCUTAneous, TID, Polliard, Rosellen Ermine T, NP, 25 mcg at 01/26/20 0800   pantoprazole (PROTONIX) tablet 40 mg, 40 mg, Oral, ACB, Polliard, Rosellen Ermine T, NP, 40 mg at 01/26/20 0713 
  arformoterol (BROVANA) neb solution 15 mcg, 15 mcg, Nebulization, BID RT, 15 mcg at 01/26/20 0824 **AND** budesonide (PULMICORT) 500 mcg/2 ml nebulizer suspension, 500 mcg, Nebulization, BID RT, Bart Herrera, NP, 500 mcg at 01/26/20 0824 
  lactobac ac& pc-s.therm-b.anim (TIAN Q/RISAQUAD), 1 Cap, Oral, DAILY, David Peterson MD, 1 Cap at 01/26/20 0800 
  oxyCODONE IR (ROXICODONE) tablet 5 mg, 5 mg, Oral, Q6H PRN, Rufino Ward MD, 5 mg at 12/30/19 0405   tamsulosin (FLOMAX) capsule 0.4 mg, 0.4 mg, Oral, DAILY, Logan Kincaid MD, 0.4 mg at 01/26/20 0800   Warfarin MD/NP dosing, , Other, PRN, Mounika Altman MD 
  sodium chloride (NS) flush 5-40 mL, 5-40 mL, IntraVENous, Q8H, Madelyn Lantigua MD, 10 mL at 01/26/20 0239   sodium chloride (NS) flush 5-40 mL, 5-40 mL, IntraVENous, PRN, Madelyn Lantigua MD, 10 mL at 01/20/20 1326   acetaminophen (TYLENOL) tablet 650 mg, 650 mg, Oral, Q6H PRN, Madelyn Lantigua MD, 650 mg at 01/21/20 1512 
  naloxone [facility]) injection 0.4 mg, 0.4 mg, IntraVENous, PRN, Madelyn Lantigua MD 
  ondansetron [facility]) injection 4 mg, 4 mg, IntraVENous, Q4H PRN, Madelyn Lantigua MD, 4 mg at 01/24/20 7187 
  sucralfate (CARAFATE) tablet 1 g, 1 g, Oral, AC&HS, Madelyn Lantigua MD, 1 g at 01/26/20 9716 
  influenza vaccine 2019-20 (6 mos+)(PF) (FLUARIX/FLULAVAL/FLUZONE QUAD) injection 0.5 mL, 0.5 mL, IntraMUSCular, PRIOR TO DISCHARGE, Amanda Altman MD 
 
Thank you for allowing me to participate in this patient's care. Brandy Rizvi MD PhD 
[address], [address] Phone: [phone] Fax: [phone]

## 2020-01-26 NOTE — PROGRESS NOTES
[facility] VAD/Heart Failure Progress Note Admit Date: 10/25/2019 POD:  5 Days Post-Op Procedure:  Procedure(s): RIGHT HEART CATH Subjective:  
Mild dyspnea, fatigue, and weakness; ambulation improved;  
 
 Objective:  
Vitals: 
Blood pressure 91/72, pulse 72, temperature 97.8 °F (36.6 °C), resp. rate 18, height 6' 2\" (1.88 m), weight 177 lb 11.1 oz (80.6 kg), SpO2 91 %. Temp (24hrs), Av.8 °F (36.6 °C), Min:97.4 °F (36.3 °C), Max:98.1 °F (36.7 °C) Hemodynamics: 
 CO: CO (l/min): 5.8 l/min CI: CI (l/min/m2): 2.8 l/min/m2 CVP: CVP (mmHg): 4 mmHg (19 1645) SVR: SVR (dyne*sec)/cm5: 1080 (dyne*sec)/cm5 (19 1200) PAP Systolic: PAP Systolic: 34 (50 3462) PAP Diastolic: PAP Diastolic: 13 ( 8266) PVR:   
 SV02: SVO2 (%): 67 % (19 1300) SCV02: SCVO2 (%): 75 % (10/29/19 1900) VAD Interrogation: LVAD (Heartmate) Pump Speed (RPM): 5800 Pump Flow (LPM): 4.4 Chatter in Lines: No 
PI (Pulsitility Index): 5.4 Power: 4.4 MAP: 78  Test: Yes 
Back Up  at Bedside & Labeled: Yes Power Module Test: Yes Driveline Site Care: No 
Driveline Dressing: Clean, Dry, and Intact EKG/Rhythm:   
 
Extubation Date / Time:  
 
CT Output:  
 
Ventilator: 
Ventilator Volumes Vt Set (ml): 550 ml (19 08) Vt Exhaled (Machine Breath) (ml): 639 ml (19 08) Vt Spont (ml): 437 ml (19 1035) Ve Observed (l/min): 7.25 l/min (19 1035) Oxygen Therapy: 
Oxygen Therapy O2 Sat (%): 91 % (20 0824) Pulse via Oximetry: 102 beats per minute (20 0704) O2 Device: Room air (20 0755) PEEP/CPAP (cm H2O): 1 cm H20 (20 1101) O2 Flow Rate (L/min): 1 l/min (20 1440) FIO2 (%): 21 % (20 0905) CXR: 
 
Admission Weight: Last Weight Weight: 192 lb 10.9 oz (87.4 kg) Weight: 177 lb 11.1 oz (80.6 kg) Intake / Output / Drain: 
Current Shift:  07 - 1900 In: 850 [P.O.:600; I.V.:250] Out: 100 [Urine:100] Last 24 hrs.:  
 
Intake/Output Summary (Last 24 hours) at 1/26/2020 1032 Last data filed at 1/26/2020 2527 Gross per 24 hour Intake 1380 ml Output 1575 ml Net -195 ml No results for input(s): CPK, CKMB, TROIQ in the last 72 hours. Recent Labs  
  01/26/20 0352 01/24/20 0359 01/24/20 0352   --  138  
K 4.3  --  4.4  
CO2 29  --  29 BUN 18  --  18  
CREA 1.03  --  0.94 GLU 94  --  91  
MG 2.0  --  2.1 WBC 3.4* 3.9*  --   
HGB 9.8* 9.7*  --   
HCT 33.1* 32.5*  --   
* 124*  --   
 
Recent Labs  
  01/26/20 0352 01/24/20 0352 INR 1.8* 2.1* PTP 17.5* 20.7* No lab exists for component: PBNP Current Facility-Administered Medications:  
  sodium chloride tablet 1 g, 1 g, Oral, BID WITH MEALS, Janna Orr MD, 1 g at 01/26/20 0801 
  albuterol-ipratropium (DUO-NEB) 2.5 MG-0.5 MG/3 ML, 3 mL, Nebulization, BID RT, Janna Orr MD, Stopped at 01/25/20 2100   acetylcysteine (MUCOMYST) 200 mg/mL (20 %) solution 600 mg, 600 mg, Nebulization, BID RT, Janna Orr MD, 600 mg at 01/26/20 0865   clonazePAM (KlonoPIN) tablet 0.5 mg, 0.5 mg, Oral, TID, Janna Orr MD, 0.5 mg at 01/26/20 0800   warfarin (COUMADIN) tablet 1 mg, 1 mg, Oral, QPM, Levi, Shana B, NP, 1 mg at 01/25/20 1709   potassium chloride SR (KLOR-CON 10) tablet 10 mEq, 10 mEq, Oral, DAILY, Levi, Shana B, NP, 10 mEq at 01/26/20 0801 
  albumin human 25% (BUMINATE) solution 12.5 g, 12.5 g, IntraVENous, BID, Shana Sims NP, 12.5 g at 01/26/20 0801 
  sodium chloride (NS) flush 5-40 mL, 5-40 mL, IntraVENous, PRN, Ed Infante MD, 10 mL at 01/25/20 9309   naloxone [facility]) injection 0.4 mg, 0.4 mg, IntraVENous, PRN, Ed Infante MD 
  hydrALAZINE (APRESOLINE) 20 mg/mL injection 20 mg, 20 mg, IntraVENous, Q6H PRN, Shana Sims NP, 20 mg at 01/25/20 7100   clonazePAM (KlonoPIN) tablet 0.25 mg, 0.25 mg, Oral, QHS PRN, Levi, Shana B, NP 
  epoetin yvonne-epbx (RETACRIT) injection 20,000 Units, 20,000 Units, SubCUTAneous, Q MON, WED & FRI, Levi, Shana B, NP, 20,000 Units at 01/24/20 2056   meropenem (MERREM) 500 mg in 0.9% sodium chloride (MBP/ADV) 50 mL, 0.5 g, IntraVENous, Q6H, Harley aRtliff MD, Last Rate: 100 mL/hr at 01/26/20 0639, 500 mg at 01/26/20 5802   levETIRAcetam (KEPPRA) tablet 250 mg, 250 mg, Oral, BID, Javed Beck MD, 250 mg at 01/26/20 0801 
  senna-docusate (PERICOLACE) 8.6-50 mg per tablet 1 Tab, 1 Tab, Oral, PRN, Beth Gee MD, 1 Tab at 01/18/20 5014   collagenase (SANTYL) 250 unit/gram ointment, , Topical, DAILY, Levi Shana B, NP 
  fludrocortisone (FLORINEF) tablet 0.1 mg, 0.1 mg, Oral, DAILY, Shana Sims, NP, 0.1 mg at 01/26/20 0801   cholecalciferol (VITAMIN D3) (1000 Units /25 mcg) tablet 2 Tab, 2,000 Units, Oral, DAILY, Adrianna Herrera NP, 2 Tab at 01/26/20 0800 
  venlafaxine-SR (EFFEXOR-XR) capsule 150 mg, 150 mg, Oral, DAILY WITH BREAKFAST, Adrianna Herrera NP, 150 mg at 01/26/20 0800   hydrocortisone (CORTAID) 1 % cream, , Topical, TID PRN, Black Shankar MD 
  thiamine HCL (B-1) tablet 100 mg, 100 mg, Oral, DAILY, Jessica ACUNA NP, 100 mg at 01/26/20 0800 
  oxybutynin (DITROPAN) tablet 5 mg, 5 mg, Oral, Q6H PRN, Shana Sims NP, 5 mg at 01/01/20 1204   magnesium oxide (MAG-OX) tablet 800 mg, 800 mg, Oral, BID, Jessica Arciniega NP, 800 mg at 01/26/20 9906   lidocaine (URO-JET/ GLYDO) 2 % jelly, , Urethral, PRN, Mounika Altman MD 
  albuterol-ipratropium (DUO-NEB) 2.5 MG-0.5 MG/3 ML, 3 mL, Nebulization, Q6H PRN, Stefano Gaytan MD, 3 mL at 01/25/20 5564   therapeutic multivitamin (THERAGRAN) tablet 1 Tab, 1 Tab, Oral, DAILY, Shana Sims NP, 1 Tab at 01/26/20 8118   alteplase (CATHFLO) 1 mg in sterile water (preservative free) 1 mL injection, 1 mg, InterCATHeter, PRN, Mounika Mcclendon MD, 1 mg at 01/20/20 1156   finasteride (PROSCAR) tablet 5 mg, 5 mg, Oral, DAILY, Celia Patel MD, 5 mg at 01/26/20 9789   diphenhydrAMINE (BENADRYL) capsule 25 mg, 25 mg, Oral, QHS PRN, Polliard, Madeleine LOPEZ, NP, 25 mg at 01/19/20 0044   octreotide (SANDOSTATIN) injection 25 mcg, 25 mcg, SubCUTAneous, TID, Polliard, Madeleine LOPEZ, NP, 25 mcg at 01/26/20 0800   pantoprazole (PROTONIX) tablet 40 mg, 40 mg, Oral, ACB, Polliard, Madeleine LOPEZ, NP, 40 mg at 01/26/20 0713 
  arformoterol (BROVANA) neb solution 15 mcg, 15 mcg, Nebulization, BID RT, 15 mcg at 01/26/20 0824 **AND** budesonide (PULMICORT) 500 mcg/2 ml nebulizer suspension, 500 mcg, Nebulization, BID RT, Sharon, Andrés Cobb NP, 500 mcg at 01/26/20 0824 
  lactobac ac& pc-s.therm-b.anim (TIAN Q/RISAQUAD), 1 Cap, Oral, DAILY, Mavis Carrizales MD, 1 Cap at 01/26/20 0800 
  oxyCODONE IR (ROXICODONE) tablet 5 mg, 5 mg, Oral, Q6H PRN, Omari Barba MD, 5 mg at 12/30/19 0405   tamsulosin (FLOMAX) capsule 0.4 mg, 0.4 mg, Oral, DAILY, Logan Kincaid MD, 0.4 mg at 01/26/20 0800   Warfarin MD/NP dosing, , Other, PRN, Mounika Altman MD 
  sodium chloride (NS) flush 5-40 mL, 5-40 mL, IntraVENous, Q8H, Omari Barba MD, 10 mL at 01/26/20 4397   sodium chloride (NS) flush 5-40 mL, 5-40 mL, IntraVENous, PRN, Omari Barba MD, 10 mL at 01/20/20 1326   acetaminophen (TYLENOL) tablet 650 mg, 650 mg, Oral, Q6H PRN, Omari Barba MD, 650 mg at 01/21/20 1512 
  naloxone [facility]) injection 0.4 mg, 0.4 mg, IntraVENous, PRN, Omari Barba MD 
  ondansetron [facility]) injection 4 mg, 4 mg, IntraVENous, Q4H PRN, Omari Barba MD, 4 mg at 01/24/20 9087 
  sucralfate (CARAFATE) tablet 1 g, 1 g, Oral, AC&HS, Omari Barba MD, 1 g at 01/26/20 9975 
  influenza vaccine 2019-20 (6 mos+)(PF) (FLUARIX/FLULAVAL/FLUZONE QUAD) injection 0.5 mL, 0.5 mL, IntraMUSCular, PRIOR TO DISCHARGE, Mounika Altman MD 
 
A/P 
  
S/P LVAD - good flows Need for anti-coagulation - coumadin DM - insulin RV dysfunction - milrinone, diuretics  
  
Risk of morbidity and mortality - high Medical decision making - high complexity 
  
  
 
Signed By: Mary Aj MD

## 2020-01-26 NOTE — PROGRESS NOTES
Brief Neurology Follow-Up: 
 
Attempted to evaluate Mr. Shagufta Reaves in follow-up this morning but patient was noted to be resting comfortably in bed and left undisturbed. Upon discussion with his bedside nurse, he experienced a dramatic response to clonazepam, with dosing somewhat limited by sedation. Olanzapine was discontinued this morning. As discussed with his bedside nurse, would recommend continuing levetiracetem at current dosing for the time being. However, after tomorrow's morning dose, would discontinue and monitor for return/worsening of symptoms on clonazepam alone. Regarding etiology, remains awaiting PET scan to further assess for possible lung malignancy. Neurology will continue to follow and make recommendations for medical management, diagnostic evaluation pending PET scan. Please call with questions, concerns in the interim.

## 2020-01-27 NOTE — PROGRESS NOTES
Problem: Self Care Deficits Care Plan (Adult) Goal: *Acute Goals and Plan of Care (Insert Text) Description FUNCTIONAL STATUS PRIOR TO ADMISSION: Patient was modified independent using a single point cane for functional mobility. Patient able to shower and dress himself. However, patient required assistance for household management from his wife. HOME SUPPORT: The patient lived alone with wife to provide assistance. Continue all goals 1/24/2020 Occupational Therapy Goals all updated/continued as follows 1/17/2020 1. Patient will perform upper body dressing including management of LVAD with supervision/setup within 7 day(s) 2. Patient will perform lower body dressing with RW CGA within 7 day(s). -continue goal (MOD A simulated 1/17) 3. Patient will perform grooming standing with RW 2 mins with supervision/setup within 7 day(s). -continue goal (CGA 1/17) 4. Patient will perform toilet transfers with RW supervision/setup using LRAD within 7 day(s). 5.  Patient will perform all aspects of toileting with RW with minimal assistance within 7 day(s). 6.  Patient will perform gathering ADL items high and waist height 2/2 with RW supervision within 7 days. -continue goal (CGA 1/17) Occupational Therapy Goals all updated 1/10/2020 1. Patient will perform upper body dressing including management of LVAD with min A within 7 day(s) 2. Patient will perform lower body dressing with RW CGA within 7 day(s). 3.  Patient will perform grooming standing with RW 2 mins with supervision/setup within 7 day(s). 4.  Patient will perform toilet transfers with RW minimum assistance within 7 day(s). 5.  Patient will perform all aspects of toileting with RW with moderate assistance within 7 day(s). 6.  Patient will perform gathering ADL items high and waist height 2/2 with RW supervision within 7 days. Continue all goals 10/30/19 post re-eval for Impella removal  
Initiated 10/28/2019 1.  Patient will perform bathing with supervision/set-up within 7 day(s). 2.  Patient will perform lower body dressing with supervision/set-up within 7 day(s). 3.  Patient will perform grooming with modified independence within 7 day(s). 4.  Patient will perform toilet transfers with modified independence within 7 day(s). 5.  Patient will perform all aspects of toileting with supervision/set-up within 7 day(s). 6.  Patient will participate in upper extremity therapeutic exercise/activities with supervision/set-up for 5 minutes within 7 day(s). 7.  Patient will utilize energy conservation techniques during functional activities with verbal cues within 7 day(s). Outcome: Progressing Towards Goal 
 OCCUPATIONAL THERAPY TREATMENT Patient: Matt Bailey (75 y.o. male) Date: 1/27/2020 Diagnosis: Acute decompensated heart failure ([facility] ([place]) [address] [I50.9] <principal problem not specified> Procedure(s) (LRB): 
RIGHT HEART CATH (N/A) 6 Days Post-Op Precautions: Fall Chart, occupational therapy assessment, plan of care, and goals were reviewed. ASSESSMENT Patient continues with skilled OT services and is progressing towards goals. Patient completed UB and LB dressing tasks today x MIN A seated in chair/standing to pull pants up/over hips (see details in objective). Patient then completed functional mobility > bathroom using walker to complete grooming tasks standing at sink x MONI. On the way back to chair, patient requiring MOD A for balance due to increased BLE weakness and fatigue. Patient also required increased verbal cues for adhering to \"move in the tube\" principles today. Patient highly motivated to regain functional independence. Continue to recommend IPR to maximize patient safety and independence with ADL transfers and tasks.   
 
Patient is verbalizing and is not demonstrating understanding of mindful-based movements (\"move in the tube\") principles of keeping UEs proximal to ribcage to prevent lateral pull on the sternum during load-bearing activities with visual, verbal, and tactile cues required for compliance. Current Level of Function Impacting Discharge (ADLs): MIN A LB ADLS; MOD A intermittently ADL transfers; MIN A LVAD management (however not consistently) Other factors to consider for discharge: LVAD  III 
    
 
PLAN : 
Patient continues to benefit from skilled intervention to address the above impairments. Continue treatment per established plan of care. to address goals. Recommend with staff: OOb x 3 to chair; BUE cardiac exercises. Recommend next OT session: LB ADL training; standing tolerance Recommendation for discharge: (in order for the patient to meet his/her long term goals) Therapy 3 hours per day 5-7 days per week This discharge recommendation: 
Has been made in collaboration with the attending provider and/or case management IF patient discharges home will need the following DME: TBD SUBJECTIVE:  
Patient stated I am waiting on the results of my PET scan.  OBJECTIVE DATA SUMMARY:  
Cognitive/Behavioral Status: 
Neurologic State: Alert Orientation Level: Oriented X4 Cognition: Appropriate for age attention/concentration Perception: Appears intact Perseveration: No perseveration noted Safety/Judgement: Decreased awareness of need for safety Functional Mobility and Transfers for ADLs: 
Bed Mobility: 
Supine to Sit: Stand-by assistance Transfers: 
Sit to Stand: Moderate assistance;Assist x1;Additional time Balance: 
Sitting: Impaired; Without support Sitting - Static: Good (unsupported) Sitting - Dynamic: Fair (occasional) Standing: Impaired; With support Standing - Static: Fair;Constant support Standing - Dynamic : Poor;Constant support ADL Intervention: 
  
 
Grooming Brushing Teeth: Minimum assistance(standing at the sink with walker) Upper Body Dressing Assistance Dressing Assistance: (A for vest management with LVAD) Pullover Shirt: Minimum assistance(a for pulling down over abdomen) Lower Body Dressing Assistance Pants With Elastic Waist: Minimum assistance(A for threading LLE) Cognitive Retraining Safety/Judgement: Decreased awareness of need for safety Patient instructed and educated on mindful movement principles based on Move in The Tube concept to include maintaining bilateral elbows close to rib cage when performing any load-bearing activity such as getting in/out of bed, pushing up from a chair, opening a door, or lifting a box. Patient was given a handout with diagrams of each correct/incorrect method of performing each of the above tasks. Patient instructed and encouraged to mobilize bilateral upper extremities outside the tube when doing any non-load bearing activity such as washing hair/body, brushing teeth, retrieving clothing items, or scratching your back. May have to adjust home setup to increase ease with items closer to waist height to prevent deep bending and the automatic  of asymmetrical UE WB/pushing for stabilization during bending. Benefit to don clothing tailor sitting and don all clothing while sitting prior to standing. Patient demonstrated lower body dressing seated/standing with Minimum assistance (A to thread LLE). Instruction and indicated understanding on the benefits of loose clothing throughout to accommodate for post surgical swelling, decreased ROM and increased pain. Instruction and indicated understanding the technique of pull over shirt versus front open clothing. Therapeutic Exercises:  
Patient instructed on the benefits and demonstrated cardiac exercises while seated with Stand-by assistance.  Instructed and indicated understanding on how to progress reps, sets against gravity, pacing through progressive muscle strengthening standing based on surgeon clearance for more weight in prep for basic and instrumental ADLs. Instruction on the use of household items in place of weights as needed. CARDIAC EXERCISE Sets Reps Active  Active Assist   
Passive  Self ROM Comments Shoulder flexion  1  5   [x]                            []                             []                             []                               
Shoulder abduction  1  5  [x]                             []                             []                             []                               
Scapular elevation  1  5  [x]                             []                              []                             []                               
Scapular retraction  1  5  [x]                             []                             []                             []                               
Trunk rotation  1  5  [x]                             []                             []                             []                               
Trunk sidebending  1  5  [x]                             []                              []                             []                                     
 
 
 
Activity Tolerance:  
Fair and requires rest breaks Please refer to the flowsheet for vital signs taken during this treatment. After treatment patient left in no apparent distress:  
Sitting in chair and Call bell within reach COMMUNICATION/COLLABORATION:  
The patients plan of care was discussed with: Physical Therapist and Registered Nurse Razia Mancini Time Calculation: 31 mins

## 2020-01-27 NOTE — PROGRESS NOTES
ID Progress Note 2020 Subjective: He states that he is feeling Max Herrick is getting some relief with the Klonopin Objective: Antibiotics: 1. Levaquin 2. Cefepime 3. Rifampin 4. Fluconazole 5. Vancomycin 6. Cefazolin 7. Zosyn 8. Driss Kil Vitals:  
Visit Vitals BP 91/72 (BP 1 Location: Left arm, BP Patient Position: At rest) Pulse 81 Temp 97.6 °F (36.4 °C) Resp 20 Ht 6' 2\" (1.88 m) Wt 84.4 kg (186 lb 1.1 oz) SpO2 92% BMI 23.89 kg/m² Tmax:  Temp (24hrs), Av.7 °F (36.5 °C), Min:97.5 °F (36.4 °C), Max:98.1 °F (36.7 °C) Exam:  Lungs: A few basilar rales Heart:  regular rate and rhythm Abdomen:  soft, non-tender. Bowel sounds normal. No masses,  no organomegaly Urine in neal is grossly clear Sternal wound is dressed and dry Labs:     
Recent Labs  
  20 
1018 20 
0352 WBC 4.5 3.4* HGB 10.3* 9.8* * 136* BUN 17 18 CREA 1.05 1.03  
SGOT 15 15  110 TBILI 0.9 0.6 Cultures: No results found for: DANIELLE Lab Results Component Value Date/Time Culture result: LIGHT YEAST (A) 2020 10:01 AM  
 Culture result: LIGHT NORMAL RESPIRATORY TIAN 2020 10:01 AM  
 Culture result: NO GROWTH 5 DAYS 2020 09:52 AM  
 
 
Radiology:  
 
Line/Insert Date:        
 
Assessment:  
 
1. UTI--Serratia (2 biotypes) from culture and small amount of Enterococcus--now with Pseudomonas from culture of urine and blood 2. CHF/cardiomyopathy 3. ?aspiration event(s) 4. Renal insufficiency 5. Respiratory difficulties Objective: 1. Adjust antibiotic therapy 2. Presence of LVAD in face of bacteremia may require longer course of therapy, or at least PO Rx for a timehe still has a ways to go 3. Work-up any fever Claudia Manuel MD

## 2020-01-27 NOTE — PROGRESS NOTES
1930; Bedside shift change report given to Esau Gitelman (oncoming nurse) by Viridiana Elizabeth (offgoing nurse). Report included the following information SBAR, Kardex, Intake/Output, MAR, Recent Results, and Cardiac Rhythm paced . Problem: Falls - Risk of 
Goal: *Absence of Falls Description Document Donato Disla Fall Risk and appropriate interventions in the flowsheet. Outcome: Progressing Towards Goal 
Note: Fall Risk Interventions: 
Mobility Interventions: Communicate number of staff needed for ambulation/transfer, OT consult for ADLs, Patient to call before getting OOB, PT Consult for mobility concerns Mentation Interventions: Adequate sleep, hydration, pain control Medication Interventions: Patient to call before getting OOB, Teach patient to arise slowly Elimination Interventions: Call light in reach, Patient to call for help with toileting needs History of Falls Interventions: Consult care management for discharge planning, Evaluate medications/consider consulting pharmacy, Room close to nurse's station Problem: Pressure Injury - Risk of 
Goal: *Prevention of pressure injury Description Document Mich Scale and appropriate interventions in the flowsheet. Offload heels Turn approximately every 2 hours Outcome: Progressing Towards Goal 
Note: Pressure Injury Interventions: 
Sensory Interventions: Assess changes in LOC, Chair cushion, Minimize linen layers, Pressure redistribution bed/mattress (bed type) Moisture Interventions: Absorbent underpads, Assess need for specialty bed, Minimize layers, Moisture barrier Activity Interventions: Increase time out of bed, Pressure redistribution bed/mattress(bed type), PT/OT evaluation Mobility Interventions: Pressure redistribution bed/mattress (bed type), PT/OT evaluation, Float heels Nutrition Interventions: Document food/fluid/supplement intake, Offer support with meals,snacks and hydration Friction and Shear Interventions: Lift sheet, HOB 30 degrees or less, Foam dressings/transparent film/skin sealants, Transferring/repositioning devices Problem: LVAD: Discharge Outcomes Goal: *Hemodynamically stable Outcome: Progressing Towards Goal 
Note:  
VSS. MAP within goal range. No PRNs given. Goal: *Lungs clear or at baseline Outcome: Progressing Towards Goal 
Note:  
Lung sounds are clear, but diminished. Goal: *Optimal pain control at patient's stated goal 
Outcome: Progressing Towards Goal 
Note:  
No c/o pain.

## 2020-01-27 NOTE — PROGRESS NOTES
[address] [facility] [address] [facility] in [place], [place] Inpatient Progress Note Patient name: Corby David Patient : 1950 Patient MRN: [id number] Attending MD: Devin Eller MD 
Date of service: 20 CHIEF COMPLAINT: 
De azucena LVAD implant 
  
PLAN: 
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 Intolerant of ACEi/ARB/ARNI/AA due to JUAN J on CKD 3 Intolerant of spironolactone d/t IVVD Continue IV antibiotics per ID; need recommendations for PO abx prophylaxis for DC Elevated cancer marker CEA in 2019, scheduled for chest PET CT to r/o lung cancer Increase nebs to TID Significant relief from klonopin - decrease to 0.25 mg PO q6h d/t somnolence; have discontinued olanzepine; plan to wean keppra once symptoms improved with klonopin; neurology consult greatly appreciated D/w neurology to workup paraneoplastic syndrome with neurologic manifestations if cancer identified or symptoms not controlled; anti-Hu ab not on [facility] facility list 
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 Soraya Eckert would like to pull sternal wire once stable INR 1.4 - 3 mg warfarin tonight Check ESR, PCT, LA today d/t vasodilation Continue physical therapy Nutritional consult; persistently low prealbumin Tentative discharge to rehab this week pending bed availability  
  
IMPRESSION: 
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 
  
CARDIAC IMAGING: 
Chest CTA- no outflow graft occlusion Pet Scan equivocal for amyloid Send [employer]- pending  
  
HEMODYNAMICS: 
RHC not done CPEST not done 6MW not done 
  
OTHER IMAGING: 
Head CT negative for acute process EEG suggestive of mild generalized encephalopathic process, possibly related to underlying structural brain injury vs metabolic abnormality 
  
INTERVAL HISTORY: 
No issues overnight NO PI events in > 18 hrs Hemodynamics at goal, MAP 80-90s, HR 70s I/O inaccurate Weight inaccurate INR 1.4 
CEA 5.4 Prealbumin 17.2 LVAD INTERROGATION: 
Device interrogated in person Device function normal, normal flow, no events LVAD Pump Speed (RPM): 5800 Pump Flow (LPM): 4.2 MAP: 119 PI (Pulsitility Index): 6.6 Power: 4.5  Test: Yes 
Back Up  at Bedside & Labeled: Yes Power Module Test: Yes Driveline Site Care: No 
Driveline Dressing: Clean, Dry, and Intact Outpatient: No 
MAP in Therapeutic Range (Outpatient): No 
Testing Alarms Reviewed: Yes 
Back up SC speed: 5800 Back up Low Speed Limit: 5400 Emergency Equipment with Patient?: Yes Emergency procedures reviewed?: Yes Drive line site inspected?: Yes Drive line intergrity inspected?: Yes Drive line dressing changed?: No 
 
PHYSICAL EXAM: 
Visit Vitals BP 91/72 (BP 1 Location: Left arm, BP Patient Position: At rest) Pulse 83 Temp 97.7 °F (36.5 °C) Resp 20 Ht 6' 2\" (1.88 m) Wt 186 lb 1.1 oz (84.4 kg) SpO2 100% BMI 23.89 kg/m² General: Patient is well developed, well-nourished in no acute distress HEENT: Normocephalic and atraumatic. No scleral icterus. Pupils are equal, round and reactive to light and accomodation. No conjunctival injection. Oropharynx is clear. Flushed. Neck: Supple. No evidence of thyroid enlargements or lymphadenopathy. JVD: Cannot be appreciated Lungs: Breath sounds are equal and clear bilaterally. No wheezes, rhonchi, or rales. Heart: Regular rate and rhythm with normal S1 and S2. No murmurs, gallops or rubs. Abdomen: Soft, no mass or tenderness. No organomegaly or hernia. Bowel sounds present. Genitourinary and rectal: deferred Extremities: No cyanosis, clubbing, or edema. Neurologic: No focal sensory or motor deficits are noted. Grossly intact. Psychiatric: Awake, alert an doriented x 3. Appropriate mood and affect. Skin: Warm, dry and well perfused. No lesions, nodules or rashes are noted. REVIEW OF SYSTEMS: 
General: Denies fever, night sweats. Ear, nose and throat: Denies difficulty hearing, sinus problems, runny nose, post-nasal drip, ringing in ears, mouth sores, loose teeth, ear pain, nosebleeds, sore throate, facial pain or numbess Cardiovascular: see above in the interval history Respiratory: Denies cough, wheezing, sputum production, hemoptysis. Gastrointestinal: Denies heartburn, constipation, intolerance to certain foods, diarrhea, abdominal pain, nausea, vomiting, difficulty swallowing, blood in stool Kidney and bladder: Denies painful urination, frequent urination, urgency, prostate problems and impotence Musculoskeletal: Denies joint pain, muscle weakness Skin and hair: Denies change in existing skin lesions, hair loss or increase, breast changes PAST MEDICAL HISTORY: 
Past Medical History:  
Diagnosis Date  Degenerative disc disease, lumbar  Heart failure ([facility]  High cholesterol  Hypertension  Paroxysmal atrial fibrillation ([facility] 2019  Spinal stenosis PAST SURGICAL HISTORY: 
Past Surgical History:  
Procedure Laterality Date  COLONOSCOPY Left 2019 COLONOSCOPY at bedside performed by Lesli Watkins MD at [address] [place] [address]  HX CORONARY ARTERY BYPASS GRAFT    
 triple  HX HERNIA REPAIR    
 HX IMPLANTABLE CARDIOVERTER DEFIBRILLATOR  PA CARDIOVERSION ELECTIVE ARRHYTHMIA EXTERNAL N/A 6/10/2019 EP CARDIOVERSION performed by John Santamaria MD at [address] [facility] [address] CATH LAB  PA CARDIOVERSION ELECTIVE ARRHYTHMIA EXTERNAL N/A 2019 EP CARDIOVERSION performed by Gurpreet Gavin MD at [address] [place] [address] CATH LAB  PA INSJ/RPLCMT PERM DFB W/TRNSVNS LDS 1/DUAL CHMBR N/A 2019 INSERT ICD BIV MULTI performed by Lorena Aranda MD at [address] [place] [address] CATH LAB  PA TCAT IMPL WRLS P-ART PRS SNR L-T HEMODYN MNTR N/A 2019 IMPLANT HEART FAILURE MONITORING DEVICE performed by Rey Bahena MD at [address] [place] [address], [address] CATH LAB FAMILY HISTORY: 
Family History Problem Relation Age of Onset  Lung Disease Mother  Hypertension Mother [facility] Arthritis-osteo Mother  Heart Disease Mother  Heart Disease Father  Diabetes Father SOCIAL HISTORY: 
Social History Socioeconomic History  Marital status:  Spouse name: Not on file  Number of children: Not on file  Years of education: Not on file  Highest education level: Not on file Tobacco Use  Smoking status: Former Smoker Last attempt to quit: 2010 Years since quittin.1  Smokeless tobacco: Never Used Substance and Sexual Activity  Alcohol use: Not Currently Comment: rarely  Drug use: Never Other Topics Concern LABORATORY RESULTS: 
  
Labs Latest Ref Rng & Units 2020 WBC 4.1 - 11.1 K/uL 4.5 3.4(L) 3. 9(L) 4.2 3.6(L) 3. 3(L) 2. 6(L)  
RBC 4.10 - 5.70 M/uL 3.50(L) 3.34(L) 3.30(L) 3.35(L) 3.42(L) 3.32(L) 3.11(L) Hemoglobin 12.1 - 17.0 g/dL 10. 3(L) 9.8(L) 9.7(L) 9.8(L) 10. 1(L) 9.8(L) 9.2(L) Hematocrit 36.6 - 50.3 % 34. 5(L) 33. 1(L) 32. 5(L) 33. 0(L) 32. 7(L) 32. 2(L) 29. 5(L) MCV 80.0 - 99.0 FL 98.6 99. 1(H) 98.5 98.5 95.6 97.0 94.9 Platelets 768 - 855 K/uL 144(L) 136(L) 124(L) 109(L) 89(L) 69(L) 68(L) Lymphocytes 12 - 49 % - - - - 8(L) 11(L) 19 Monocytes 5 - 13 % - - - - 9 5 12 Eosinophils 0 - 7 % - - - - 0 2 4 Basophils 0 - 1 % - - - - 1 0 1 Bands 0 - 6 % - - - - 0 2 2 Blasts 0 % - - - - 0 0 0 Albumin 3.5 - 5.0 g/dL 2. 8(L) 2. 6(L) 2. 3(L) 2. 2(L) 2. 2(L) 2. 1(L) 2. 1(L) Calcium 8.5 - 10.1 MG/DL 8.7 8.7 8.7 9.0 8.9 8.6 8.4(L) SGOT 15 - 37 U/L 15 15 14(L) 18 21 29 31 Glucose 65 - 100 mg/dL 83 94 91 88 93 105(H) 92 BUN 6 - 20 MG/DL 17 18 18 23(H) 25(H) 25(H) 25(H) Creatinine 0.70 - 1.30 MG/DL 1.05 1.03 0.94 1.06 1.05 1.00 0.99 Sodium 136 - 145 mmol/L 137 140 138 137 137 134(L) 133(L) Potassium 3.5 - 5.1 mmol/L 4.6 4.3 4.4 4.9 4.4 3.9 4.1 TSH 0.36 - 3.74 uIU/mL - - - - - - -  
LDH 85 - 241 U/L - 174 170 204 245(H) 274(H) 251(H) CEA ng/mL - 5.4 - - - - - Some recent data might be hidden Lab Results Component Value Date/Time TSH 2.30 12/03/2019 03:29 AM  
 TSH 2.40 10/25/2019 07:39 PM  
 TSH 2.45 06/01/2019 04:16 AM  
 
 
ALLERGY: 
Allergies Allergen Reactions  Cefepime Other (comments)  
  myoclonus CURRENT MEDICATIONS: 
 
Current Facility-Administered Medications:  
  clonazePAM (KlonoPIN) tablet 0.25 mg, 0.25 mg, Oral, TID, Mary Herrera, NP 
  albuterol-ipratropium (DUO-NEB) 2.5 MG-0.5 MG/3 ML, 3 mL, Nebulization, TID RT, Mary Herrera NP 
  acetylcysteine (MUCOMYST) 200 mg/mL (20 %) solution 600 mg, 600 mg, Nebulization, TID RT, Polliard, Lizzie Vigil T, NP 
  arformoteroL (BROVANA) neb solution 15 mcg, 15 mcg, Nebulization, BID RT **AND** budesonide (PULMICORT) 500 mcg/2 ml nebulizer suspension, 500 mcg, Nebulization, BID RT, Win Herrera NP 
  [START ON 1/28/2020] albumin human 25% (BUMINATE) solution 12.5 g, 12.5 g, IntraVENous, DAILY, Win Herrera NP 
  warfarin (COUMADIN) tablet 3 mg, 3 mg, Oral, ONCE, Kvng Herrera T, NP 
  sodium chloride tablet 1 g, 1 g, Oral, BID WITH MEALS, Chon Lopez MD, 1 g at 01/27/20 8115   warfarin (COUMADIN) tablet 1 mg, 1 mg, Oral, QPM, Levi, Shana B, NP, 1 mg at 01/26/20 1756   potassium chloride SR (KLOR-CON 10) tablet 10 mEq, 10 mEq, Oral, DAILY, Levi, Shana B, NP, 10 mEq at 01/27/20 8043   sodium chloride (NS) flush 5-40 mL, 5-40 mL, IntraVENous, PRN, Jesse Tan MD, 10 mL at 01/25/20 3708   naloxone [facility]) injection 0.4 mg, 0.4 mg, IntraVENous, PRN, Jesse Tan MD 
  hydrALAZINE (APRESOLINE) 20 mg/mL injection 20 mg, 20 mg, IntraVENous, Q6H PRN, Levi Shana B, NP, 20 mg at 01/27/20 3291   clonazePAM (KlonoPIN) tablet 0.25 mg, 0.25 mg, Oral, QHS PRN, Levi, Shana B, NP 
  epoetin yvonne-epbx (RETACRIT) injection 20,000 Units, 20,000 Units, SubCUTAneous, Q MON, WED & FRI, Levi, Shana B, NP, 20,000 Units at 01/24/20 2056   meropenem (MERREM) 500 mg in 0.9% sodium chloride (MBP/ADV) 50 mL, 0.5 g, IntraVENous, Q6H, Harjeet Galloway MD, Last Rate: 100 mL/hr at 01/27/20 0543, 500 mg at 01/27/20 0543   levETIRAcetam (KEPPRA) tablet 250 mg, 250 mg, Oral, BID, Javed Beck MD, 250 mg at 01/27/20 8586   senna-docusate (PERICOLACE) 8.6-50 mg per tablet 1 Tab, 1 Tab, Oral, PRN, Angelia Agee MD, 1 Tab at 01/18/20 7231   collagenase (SANTYL) 250 unit/gram ointment, , Topical, DAILY, Levi, Shana B, NP 
  fludrocortisone (FLORINEF) tablet 0.1 mg, 0.1 mg, Oral, DAILY, Levi, Shana B, NP, 0.1 mg at 01/27/20 6041   cholecalciferol (VITAMIN D3) (1000 Units /25 mcg) tablet 2 Tab, 2,000 Units, Oral, DAILY, Preston Herrera NP, 2 Tab at 01/27/20 0836 
  venlafaxine-SR (EFFEXOR-XR) capsule 150 mg, 150 mg, Oral, DAILY WITH BREAKFAST, Angelo Herrera NP, 150 mg at 01/27/20 0800   hydrocortisone (CORTAID) 1 % cream, , Topical, TID PRN, Elijah Altman MD 
  thiamine HCL (B-1) tablet 100 mg, 100 mg, Oral, DAILY, Lorriane Floyd, NP, 100 mg at 01/27/20 7613   oxybutynin (DITROPAN) tablet 5 mg, 5 mg, Oral, Q6H PRN, Shana Sims NP, 5 mg at 01/01/20 1204   magnesium oxide (MAG-OX) tablet 800 mg, 800 mg, Oral, BID, Lorriane Floyd, NP, 800 mg at 01/27/20 4958   lidocaine (URO-JET/ GLYDO) 2 % jelly, , Urethral, PRN, Mounika Altman MD 
  albuterol-ipratropium (DUO-NEB) 2.5 MG-0.5 MG/3 ML, 3 mL, Nebulization, Q6H PRN, Sachi Vincent MD, 3 mL at 01/25/20 4273   therapeutic multivitamin (THERAGRAN) tablet 1 Tab, 1 Tab, Oral, DAILY, Shana Sims, NP, 1 Tab at 01/27/20 0630   alteplase (CATHFLO) 1 mg in sterile water (preservative free) 1 mL injection, 1 mg, InterCATHeter, PRN, Mounika Nelson MD, 1 mg at 01/20/20 1156   finasteride (PROSCAR) tablet 5 mg, 5 mg, Oral, DAILY, Narda Marks MD, 5 mg at 01/27/20 0830   diphenhydrAMINE (BENADRYL) capsule 25 mg, 25 mg, Oral, QHS PRN, Angelo Herrera NP, 25 mg at 01/19/20 0044   octreotide (SANDOSTATIN) injection 25 mcg, 25 mcg, SubCUTAneous, TID, Preston Herrera NP, 25 mcg at 01/27/20 1299   pantoprazole (PROTONIX) tablet 40 mg, 40 mg, Oral, ACB, Angelo Herrera NP, 40 mg at 01/27/20 5885   lactobac ac& pc-s.therm-b.anim (TIAN Q/RISAQUAD), 1 Cap, Oral, DAILY, Carmen Baca MD, 1 Cap at 01/27/20 9075   oxyCODONE IR (ROXICODONE) tablet 5 mg, 5 mg, Oral, Q6H PRN, Aurora Ann MD, 5 mg at 12/30/19 0405   tamsulosin (FLOMAX) capsule 0.4 mg, 0.4 mg, Oral, DAILY, Logan Kincaid MD, 0.4 mg at 01/27/20 0830   Warfarin MD/NP dosing, , Other, PRN, Elijah Altman MD 
   sodium chloride (NS) flush 5-40 mL, 5-40 mL, IntraVENous, Q8H, Silvana Kothari MD, 10 mL at 01/27/20 6499   sodium chloride (NS) flush 5-40 mL, 5-40 mL, IntraVENous, PRN, Silvana Kothari MD, 10 mL at 01/20/20 1326   acetaminophen (TYLENOL) tablet 650 mg, 650 mg, Oral, Q6H PRN, Silvana Kothari MD, 650 mg at 01/21/20 1512 
  naloxone [facility]) injection 0.4 mg, 0.4 mg, IntraVENous, PRN, Silvana Kothari MD 
  ondansetron [facility]) injection 4 mg, 4 mg, IntraVENous, Q4H PRN, Silvana Kothari MD, 4 mg at 01/24/20 9115 
  sucralfate (CARAFATE) tablet 1 g, 1 g, Oral, AC&HS, Silvana Kothari MD, Stopped at 01/27/20 0111   influenza vaccine 2019-20 (6 mos+)(PF) (FLUARIX/FLULAVAL/FLUZONE QUAD) injection 0.5 mL, 0.5 mL, IntraMUSCular, PRIOR TO DISCHARGE, Bogaev, Jonetta Blizzard, MD 
 
Thank you for allowing me to participate in this patient's care. Robert Jimenez [facility] [address] [place] [address], [address] Phone: [phone] Fax: [phone] [facility] ATTENDING ADDENDUM Patient was seen and examined in person. Data and notes were reviewed. I have discussed and agree with the plan as noted in the NP note above without further additions. Ardyth Bernheim, MD PhD 
[address] [facility]

## 2020-01-27 NOTE — PROGRESS NOTES
Problem: Mobility Impaired (Adult and Pediatric) Goal: *Acute Goals and Plan of Care (Insert Text) Description FUNCTIONAL STATUS PRIOR TO ADMISSION: Patient was modified independent using a single point cane for functional mobility. Patient reports an increasingly sedentary lifestyle 2* fatigue and SOB/dyspnea. Retired (so is his wife). Patient reports x 3 falls within the last couple of weeks. Patient is wearing either nasal cannula or CPAP at night. LVAD work-up has been initiated. HOME SUPPORT PRIOR TO ADMISSION: The patient lived with his wife, but did not require physical assistance. Physical Therapy Goals: 
 
Reassessment completed 1/23/2020 and goals upgraded as appropriate. 1.  Patient will move from supine to sit and sit to supine, scoot up and down and roll side to side in bed with supervision within 7 days. 2.  Patient will perform sit to/from stand with contact guard assistance x 1 within 7 days. 3.  Patient will ambulate 50 feet, twice, with least restrictive assistive device and moderate assistance within 7 days. 4.  Stair goal to be formulated when appropriate. 5.  Patient will perform cardiac exercises per protocol with independence and verbal cuing only for performance remembrance BID within 7 days. 6.  Patient will verbally and functionally recall mindful movement principles (Move in the Tube) with minimal verbal cuing/reminding within 7 days. 7.  Patient will perform power exchange for power module to/from battery with verbal cuing within 7 days. Reassessed 1/17/2020 and goals upgraded as approporiate Goals upgraded as appropriate 1/07/2020- Goals appropriate 1/9/2020 on weekly reassessment 1. Patient will move from supine to sit and sit to supine, scoot up and down and roll side to side in bed with supervision within 7 days. 2.  Patient will perform sit to/from stand with minimal assistance x 1 within 7 days. - Upgrade to contact guard assist 1/17/2020 3.  Patient will ambulate 50 feet with least restrictive assistive device and moderate assistance within 7 days. 4.  Stair goal to be formulated when appropriate. 5.  Patient will perform cardiac exercises per protocol with supervision within 7 days. 6.  Patient will verbally and functionally recall mindful movement principles (Move in the Tube) with minimal verbal cuing/reminding within 7 days. 7.  Patient will perform power exchange for power module to/from battery with minimal assistance within 7 days. - Upgrade to verbal cuing only 1/17/2020 Reassessed on 1/2/2020 and goals not met, carry-over. Reassessed on 12/26/2019, goals not met but remain appropriate, so carry over Weekly reassessment completed 12/19/2019 and goals downgraded as appropriate. 1.  Patient will move from supine to sit and sit to supine, scoot up and down and roll side to side in bed with contact guard assistance within 7 days. 2.  Patient will perform sit to/from stand with minimal assistance x 1 within 7 days. 3.  Patient will ambulate 25 feet with least restrictive assistive device and moderate assistance within 7 days. 4.  Stair goal to be formulated when appropriate. 5.  Patient will perform cardiac exercises per protocol with supervision within 7 days. 6.  Patient will verbally and functionally recall mindful movement principles (Move in the Tube) with minimal verbal cuing/reminding within 7 days. 7.  Patient will perform power exchange for power module to/from battery with moderate assistance within 7 days. Re-evaluation completed 11/20/2019 and new goals formulated following LVAD implantation. Reviewed and cont 12/3/2019. Reviewed and continued 12/12/2019 1. Patient will move from supine to sit and sit to supine, scoot up and down and roll side to side in bed with minimal assistance/contact guard assist within 7 days.    
2.  Patient will perform sit to/from stand with minimal assistance/contact guard assist within 7 days. 3.  Patient will ambulate 150 feet with least restrictive assistive device and minimal assistance/contact guard assist within 7 days. 4.  Patient will ascend/descend 4 stairs with handrail(s) with minimal assistance/contact guard assist within 7 days. 5.  Patient will perform cardiac exercises per protocol with supervision within 7 days. 6.  Patient will verbally and functionally recall 3/3 sternal precautions within 7 days. 7.  Patient will perform power exchange for power module to/from battery with moderate assistance  within 7 days. Initiated 10/27/2019; updated 11/15/2019 1. Patient will move from supine to sit and sit to supine, scoot up and down and roll side to side in bed with independence within 7 days. 2.  Patient will perform sit to/from stand with supervision/set-up within 7 days. 3.  Patient will ambulate 200 feet with least restrictive assistive device and supervision/set-up within 7 days. 4.  Patient will ascend/descend 4 stairs with  handrail(s) with supervision/set-up within 7 days for functional strengthening and community reintegration. Lilia Tomas 5.  Patient will verbally and functionally recall 3/3 sternal precautions within 7 days in preparation for LVAD implantation. 6.  Patient will perform a mock power exchange for power module to/from battery with supervision/set-up within 7 days in preparation for LVAD implantation. 1.  Patient will move from supine to sit and sit to supine, scoot up and down and roll side to side in bed with independence within 7 days. 2.  Patient will perform sit to/from stand with supervision/set-up within 7 days. 3.  Patient will ambulate 150 feet with least restrictive assistive device and supervision/set-up within 7 days. 4.  Patient will ascend/descend 4 stairs with  handrail(s) with supervision/set-up within 7 days for functional strengthening and community reintegration. Lilia Tomas 5.  Patient will verbally and functionally recall 3/3 sternal precautions within 7 days in preparation for LVAD implantation. 6.  Patient will perform a mock power exchange for power module to/from battery with supervision/set-up within 7 days in preparation for LVAD implantation. Outcome: Progressing Towards Goal 
            
 
PHYSICAL THERAPY TREATMENT Patient: Priyanka Arora (75 y.o. male) Date: 1/27/2020 Diagnosis: Acute decompensated heart failure ([facility] [address] [I50.9] <principal problem not specified> Procedure(s) (LRB): 
RIGHT HEART CATH (N/A) 6 Days Post-Op Precautions: Fall Chart, physical therapy assessment, plan of care and goals were reviewed. ASSESSMENT Patient continues with skilled PT services and is progressing towards goals. Pt was able to ambulate with assistance of 2 and a wheelchair follow. Pt is inconsistent with gait tolerance. Pt with an ataxic gait with increase trunk movement. Kecia Ramirez Current Level of Function Impacting Discharge (mobility/balance): min/mod Ax2 with rolling walker Other factors to consider for discharge: decrease activity  tolerance, ataxic PLAN : 
Patient continues to benefit from skilled intervention to address the above impairments. Continue treatment per established plan of care. to address goals. Recommendation for discharge: (in order for the patient to meet his/her long term goals) Therapy 3 hours per day 5-7 days per week This discharge recommendation: 
Has been made in collaboration with the attending provider and/or case management IF patient discharges home will need the following DME: to be determined (TBD) SUBJECTIVE:  
Patient stated I can't go as far.  OBJECTIVE DATA SUMMARY:  
Critical Behavior: 
Neurologic State: Alert Orientation Level: Oriented X4 Cognition: Appropriate for age attention/concentration, Appropriate decision making, Follows commands Safety/Judgement: Decreased insight into deficits, Decreased awareness of need for safety, Decreased awareness of need for assistance Functional Mobility Training: 
Bed Mobility: 
  
Supine to Sit: Stand-by assistance Transfers: 
Sit to Stand: Minimum assistance; Moderate assistance Stand to Sit: Minimum assistance Balance: 
Standing: Impaired Standing - Static: Fair Standing - Dynamic : Poor Ambulation/Gait Training: 
Distance (ft): 50 Feet (ft)(x2 seated rest break) Assistive Device: Gait belt;Walker, rolling(+ chair follow) Ambulation - Level of Assistance: Minimal assistance; Moderate assistance;Assist x2 Gait Abnormalities: Ataxic;Decreased step clearance Base of Support: Center of gravity altered Stairs: Therapeutic Exercises:  
 
Pain Rating: No complaints Activity Tolerance:  
limited Please refer to the flowsheet for vital signs taken during this treatment. After treatment patient left in no apparent distress:  
Sitting in chair, Call bell within reach, and Caregiver / family present COMMUNICATION/COLLABORATION:  
The patients plan of care was discussed with: Registered Nurse Almaz Cuba PTA Time Calculation: 24 mins

## 2020-01-28 NOTE — PROGRESS NOTES
Transitions of Care Plan: 
   Patient medically stable for discharge to Inpatient Rehab - awaiting bed at [address] [facility] Disposition:  [facility]; Transportation: [address] / [place] 
 
1926 - CM called [facility] At[facility] (p: [phone]) to request update on bed availability and if [facility] needs updated clinicals. CM left voicemail and requested call back. 1435 - CM updated by Sanchez Atkins. Small chance bed will be available on Friday - approximately 5% chance of bed availability on Friday. CM will continue to follow.  
 
Gaye Diamond, MPH

## 2020-01-28 NOTE — PROGRESS NOTES
[address] [facility] in [place], [place] Inpatient Progress Note Patient name: Priyanka Arora Patient : 1950 Patient MRN: [id number] Attending MD: Corin Odell MD 
Date of service: 20 CHIEF COMPLAINT: 
De azucena LVAD implant 
  
PLAN: 
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 Oncology consult placed; appreciate Pulmonology assistance Cont nebs TID Significant relief from klonopin - continue 0.25 mg PO q6h; have discontinued olanzepine; plan to wean keppra once symptoms improved with klonopin Re-consult neurology - ?best combination (clonazepam + Keppra vs olanzepine + Keppra) D/w neurology to workup paraneoplastic syndrome with neurologic manifestations if cancer identified or symptoms not controlled; anti-Hu ab not on [facility] [place] facility list  
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 Antonio Evans would like to pull sternal wire once stable INR 1.4 - 2 mg warfarin this AM + 2 mg tonight Continue physical therapy Nutritional consult; persistently low prealbumin Tentative discharge to rehab early next week pending bed availability  
  
IMPRESSION: 
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 
  
CARDIAC IMAGING: 
Chest CTA- no outflow graft occlusion Pet Scan equivocal for amyloid Send [employer]- pending  
  
OTHER IMAGING: 
Head CT negative for acute process EEG suggestive of mild generalized encephalopathic process, possibly related to underlying structural brain injury vs metabolic abnormality 
  
INTERVAL HISTORY: 
No issues overnight Occasional PI events Hemodynamics at goal, MAP 80-90s, HR 70s INR 1.4 
CEA 5.4 Prealbumin 17.2 PET shows increased activity in RML 
 
LVAD INTERROGATION: 
Device interrogated in person Device function normal, normal flow, no events LVAD Pump Speed (RPM): 5800 Pump Flow (LPM): 5.3 MAP: 75 
PI (Pulsitility Index): 2.3 Power: 4.2  Test: No 
Back Up  at Bedside & Labeled: No 
Power Module Test: No 
Driveline Site Care: No 
Driveline Dressing: Clean, Dry, and Intact Outpatient: No 
MAP in Therapeutic Range (Outpatient): Yes Testing Alarms Reviewed: Yes 
Back up SC speed: 5800 Back up Low Speed Limit: 5400 Emergency Equipment with Patient?: Yes Emergency procedures reviewed?: Yes Drive line site inspected?: Yes Drive line intergrity inspected?: Yes Drive line dressing changed?: No 
 
PHYSICAL EXAM: 
Visit Vitals BP 91/72 (BP 1 Location: Left arm, BP Patient Position: At rest) Pulse (!) 112 Temp 97.6 °F (36.4 °C) Resp 18 Ht 6' 2\" (1.88 m) Wt 184 lb 15.5 oz (83.9 kg) SpO2 96% BMI 23.75 kg/m² General: Patient is well developed, well-nourished in no acute distress HEENT: Normocephalic and atraumatic. No scleral icterus. Pupils are equal, round and reactive to light and accomodation. No conjunctival injection. Oropharynx is clear. Flushed. Neck: Supple. No evidence of thyroid enlargements or lymphadenopathy. JVD: Cannot be appreciated Lungs: Breath sounds are equal and clear bilaterally. No wheezes, rhonchi, or rales. Heart: Regular rate and rhythm with normal S1 and S2. No murmurs, gallops or rubs. Abdomen: Soft, no mass or tenderness. No organomegaly or hernia. Bowel sounds present. Genitourinary and rectal: deferred Extremities: No cyanosis, clubbing, or edema. Neurologic: No focal sensory or motor deficits are noted. Grossly intact. Psychiatric: Awake, alert an doriented x 3. Appropriate mood and affect. Skin: Warm, dry and well perfused. No lesions, nodules or rashes are noted. REVIEW OF SYSTEMS: 
General: Denies fever, night sweats. Ear, nose and throat: Denies difficulty hearing, sinus problems, runny nose, post-nasal drip, ringing in ears, mouth sores, loose teeth, ear pain, nosebleeds, sore throate, facial pain or numbess Cardiovascular: see above in the interval history Respiratory: Denies cough, wheezing, sputum production, hemoptysis. Gastrointestinal: Denies heartburn, constipation, intolerance to certain foods, diarrhea, abdominal pain, nausea, vomiting, difficulty swallowing, blood in stool Kidney and bladder: Denies painful urination, frequent urination, urgency, prostate problems and impotence Musculoskeletal: Denies joint pain, muscle weakness Skin and hair: Denies change in existing skin lesions, hair loss or increase, breast changes PAST MEDICAL HISTORY: 
Past Medical History:  
Diagnosis Date  Degenerative disc disease, lumbar  Heart failure ([facility] [address]  High cholesterol  Hypertension  Paroxysmal atrial fibrillation ([facility] [address] 2019  Spinal stenosis PAST SURGICAL HISTORY: 
Past Surgical History:  
Procedure Laterality Date  COLONOSCOPY Left 2019 COLONOSCOPY at bedside performed by Ihsa Su MD at [address] [place] [address]  HX CORONARY ARTERY BYPASS GRAFT    
 triple  HX HERNIA REPAIR    
 HX IMPLANTABLE CARDIOVERTER DEFIBRILLATOR  [facility] CARDIOVERSION ELECTIVE ARRHYTHMIA EXTERNAL N/A 6/10/2019 EP CARDIOVERSION performed by Liz Renteria MD at [address] CATH LAB  [facility] CARDIOVERSION ELECTIVE ARRHYTHMIA EXTERNAL N/A 2019 EP [facility] [address] C[facility]  VA INSJ/RPLCMT PERM DFB W/TRNSVNS LDS 1/DUAL CHMBR N/A 2019 INSERT ICD BIV MULTI performed by Alexi Mathew MD at [facility] [address] CATH LAB  [facility] TCAT IMPL WRLS P-ART PRS SNR L-T HEMODYN MNTR N/A 2019 IMPLANT HEART FAILURE MONITORING DEVICE performed by Loi Mancia MD at [address] CATH LAB FAMILY HISTORY: 
Family History Problem Relation Age of Onset  Lung Disease Mother  Hypertension Mother Canseco Arthritis-osteo Mother  Heart Disease Mother  Heart Disease Father  Diabetes Father SOCIAL HISTORY: 
Social History Socioeconomic History  Marital status:  Spouse name: Not on file  Number of children: Not on file  Years of education: Not on file  Highest education level: Not on file Tobacco Use  Smoking status: Former Smoker Last attempt to quit: 2010 Years since quittin.1  Smokeless tobacco: Never Used Substance and Sexual Activity  Alcohol use: Not Currently Comment: rarely  Drug use: Never Other Topics Concern LABORATORY RESULTS: 
  
Labs Latest Ref Rng & Units 2020 1/22/2020 1/20/2020 1/19/2020 WBC 4.1 - 11.1 K/uL 3. 9(L) 4.5 3.4(L) 3. 9(L) 4.2 3.6(L) 3. 3(L)  
RBC 4.10 - 5.70 M/uL 3.44(L) 3.50(L) 3.34(L) 3.30(L) 3.35(L) 3.42(L) 3.32(L) Hemoglobin 12.1 - 17.0 g/dL 10. 1(L) 10. 3(L) 9.8(L) 9.7(L) 9.8(L) 10. 1(L) 9.8(L) Hematocrit 36.6 - 50.3 % 33. 8(L) 34. 5(L) 33. 1(L) 32. 5(L) 33. 0(L) 32. 7(L) 32. 2(L) MCV 80.0 - 99.0 FL 98.3 98.6 99. 1(H) 98.5 98.5 95.6 97.0 Platelets 762 - 338 K/uL 136(L) 144(L) 136(L) 124(L) 109(L) 89(L) 69(L) Lymphocytes 12 - 49 % - - - - - 8(L) 11(L) Monocytes 5 - 13 % - - - - - 9 5 Eosinophils 0 - 7 % - - - - - 0 2 Basophils 0 - 1 % - - - - - 1 0 Bands 0 - 6 % - - - - - 0 2 Blasts 0 % - - - - - 0 0 Albumin 3.5 - 5.0 g/dL 2. 7(L) 2. 8(L) 2. 6(L) 2. 3(L) 2. 2(L) 2. 2(L) 2. 1(L) Calcium 8.5 - 10.1 MG/DL 8.7 8.7 8.7 8.7 9.0 8.9 8.6 SGOT 15 - 37 U/L 14(L) 15 15 14(L) 18 21 29 Glucose 65 - 100 mg/dL 102(H) 83 94 91 88 93 105(H) BUN 6 - 20 MG/DL 18 17 18 18 23(H) 25(H) 25(H) Creatinine 0.70 - 1.30 MG/DL 0.99 1.05 1.03 0.94 1.06 1.05 1.00 Sodium 136 - 145 mmol/L 136 137 140 138 137 137 134(L) Potassium 3.5 - 5.1 mmol/L 4.0 4.6 4.3 4.4 4.9 4.4 3.9 TSH 0.36 - 3.74 uIU/mL - - - - - - -  
LDH 85 - 241 U/L 175 - 174 170 204 245(H) 274(H) CEA ng/mL - - 5.4 - - - - Some recent data might be hidden Lab Results Component Value Date/Time TSH 2.30 12/03/2019 03:29 AM  
 TSH 2.40 10/25/2019 07:39 PM  
 TSH 2.45 06/01/2019 04:16 AM  
 
 
ALLERGY: 
Allergies Allergen Reactions  Cefepime Other (comments)  
  myoclonus CURRENT MEDICATIONS: 
 
Current Facility-Administered Medications:  
  warfarin (COUMADIN) tablet 2 mg, 2 mg, Oral, ONCE, Polliard, Anabelle Bobo T, NP 
  clonazePAM (KlonoPIN) tablet 0.25 mg, 0.25 mg, Oral, TID, Polliard, Lyndaina Jeramie T, NP, 0.25 mg at 01/28/20 0850 
  albuterol-ipratropium (DUO-NEB) 2.5 MG-0.5 MG/3 ML, 3 mL, Nebulization, TID RT, Sharon, Anabelle LOPEZ, NP, 3 mL at 01/28/20 1255   acetylcysteine (MUCOMYST) 200 mg/mL (20 %) solution 600 mg, 600 mg, Nebulization, TID RT, Basilia Herrera Merlos JOHN, NP, 600 mg at 01/28/20 1255   arformoteroL (BROVANA) neb solution 15 mcg, 15 mcg, Nebulization, BID RT **AND** budesonide (PULMICORT) 500 mcg/2 ml nebulizer suspension, 500 mcg, Nebulization, BID RT, Basilia Herrera Merlos T, NP, 500 mcg at 01/28/20 0709 
  albumin human 25% (BUMINATE) solution 12.5 g, 12.5 g, IntraVENous, DAILY, Basilia Herrera Merlos T, NP, 12.5 g at 01/28/20 9624   [START ON 1/29/2020] epoetin yvonne-epbx (RETACRIT) injection 20,000 Units, 20,000 Units, SubCUTAneous, Q MON, WED & FRI, Sabra Herrera, NP 
  sodium chloride tablet 1 g, 1 g, Oral, BID WITH MEALS, Chase Brandon MD, 1 g at 01/28/20 1268   potassium chloride SR (KLOR-CON 10) tablet 10 mEq, 10 mEq, Oral, DAILY, Levi, Shana B, NP, 10 mEq at 01/28/20 0847 
  sodium chloride (NS) flush 5-40 mL, 5-40 mL, IntraVENous, PRN, Shabbir Mackay MD, 10 mL at 01/25/20 2863   naloxone [facility]) injection 0.4 mg, 0.4 mg, IntraVENous, PRN, Shabbir Mackay MD 
  hydrALAZINE (APRESOLINE) 20 mg/mL injection 20 mg, 20 mg, IntraVENous, Q6H PRN, Levi, Shana B, NP, 20 mg at 01/28/20 0037   clonazePAM (KlonoPIN) tablet 0.25 mg, 0.25 mg, Oral, QHS PRN, Levi Shana B, NP 
  meropenem (MERREM) 500 mg in 0.9% sodium chloride (MBP/ADV) 50 mL, 0.5 g, IntraVENous, Q6H, Juan C Olivares MD, Last Rate: 100 mL/hr at 01/28/20 1321, 500 mg at 01/28/20 1321   levETIRAcetam (KEPPRA) tablet 250 mg, 250 mg, Oral, BID, Javed Beck MD, 250 mg at 01/28/20 0846 
  senna-docusate (PERICOLACE) 8.6-50 mg per tablet 1 Tab, 1 Tab, Oral, PRN, Ciera Stevens MD, 1 Tab at 01/18/20 7746   collagenase (SANTYL) 250 unit/gram ointment, , Topical, DAILY, Levi, Shana B, NP 
  fludrocortisone (FLORINEF) tablet 0.1 mg, 0.1 mg, Oral, DAILY, Levi, Shana B, NP, 0.1 mg at 01/28/20 7779   cholecalciferol (VITAMIN D3) (1000 Units /25 mcg) tablet 2 Tab, 2,000 Units, Oral, DAILY, PollJacobo capone NP, 2 Tab at 01/28/20 0839 
  venlafaxine-SR (EFFEXOR-XR) capsule 150 mg, 150 mg, Oral, DAILY WITH BREAKFAST, Katelynnd, Jacobo Hackett NP, 150 mg at 01/28/20 2784   hydrocortisone (CORTAID) 1 % cream, , Topical, TID PRN, Luz Maria Altman MD 
  thiamine HCL (B-1) tablet 100 mg, 100 mg, Oral, DAILY, Sierra Castro NP, 100 mg at 01/28/20 5805   oxybutynin (DITROPAN) tablet 5 mg, 5 mg, Oral, Q6H PRN, Shana Sims NP, 5 mg at 01/01/20 1204   magnesium oxide (MAG-OX) tablet 800 mg, 800 mg, Oral, BID, Sierra Castro NP, 800 mg at 01/28/20 9681   lidocaine (URO-JET/ GLYDO) 2 % jelly, , Urethral, PRN, Mounika Altman MD 
  albuterol-ipratropium (DUO-NEB) 2.5 MG-0.5 MG/3 ML, 3 mL, Nebulization, Q6H PRN, Etelvina Nunez MD, 3 mL at 01/25/20 6187   therapeutic multivitamin (THERAGRAN) tablet 1 Tab, 1 Tab, Oral, DAILY, Shana Sims NP, 1 Tab at 01/28/20 0840 
  alteplase (CATHFLO) 1 mg in sterile water (preservative free) 1 mL injection, 1 mg, InterCATHeter, PRN, Mounika Arguello MD, 1 mg at 01/20/20 1156   finasteride (PROSCAR) tablet 5 mg, 5 mg, Oral, DAILY, Stu Rowan MD, 5 mg at 01/28/20 8022   diphenhydrAMINE (BENADRYL) capsule 25 mg, 25 mg, Oral, QHS PRN, Юлия Herrera NP, 25 mg at 01/19/20 0044   octreotide (SANDOSTATIN) injection 25 mcg, 25 mcg, SubCUTAneous, TID, Jacobo Herrera NP, 25 mcg at 01/28/20 9540   pantoprazole (PROTONIX) tablet 40 mg, 40 mg, Oral, ACB, Jacobo Herrera NP, 40 mg at 01/28/20 0726 
  lactobac ac& pc-s.therm-b.anim (TIAN Q/RISAQUAD), 1 Cap, Oral, DAILY, Camryn Geronimo MD, 1 Cap at 01/28/20 3003   oxyCODONE IR (ROXICODONE) tablet 5 mg, 5 mg, Oral, Q6H PRN, Sherry Arteaga MD, 5 mg at 12/30/19 0405   tamsulosin (FLOMAX) capsule 0.4 mg, 0.4 mg, Oral, DAILY, Logan Kincaid MD, 0.4 mg at 01/28/20 1925   Warfarin MD/NP dosing, , Other, PRN, Mounika Altman MD 
  sodium chloride (NS) flush 5-40 mL, 5-40 mL, IntraVENous, Q8H, Harshal Lance MD, 10 mL at 01/28/20 0730 
  sodium chloride (NS) flush 5-40 mL, 5-40 mL, IntraVENous, PRN, Ana Narayanan MD, 10 mL at 01/20/20 1326   acetaminophen (TYLENOL) tablet 650 mg, 650 mg, Oral, Q6H PRN, Ana Narayanan MD, 650 mg at 01/21/20 1512 
  naloxone [facility]) injection 0.4 mg, 0.4 mg, IntraVENous, PRN, Ana Narayanan MD 
  ondansetron [facility]) injection 4 mg, 4 mg, IntraVENous, Q4H PRN, Ana Narayanan MD, 4 mg at 01/24/20 6929 
  sucralfate (CARAFATE) tablet 1 g, 1 g, Oral, AC&HS, Ana Narayanan MD, 1 g at 01/28/20 1320 
  influenza vaccine 2019-20 (6 mos+)(PF) (FLUARIX/FLULAVAL/FLUZONE QUAD) injection 0.5 mL, 0.5 mL, IntraMUSCular, PRIOR TO DISCHARGE, Martin Altman MD 
 
Thank you for allowing me to participate in this patient's care. Marianna Colbert AGACNP-BC [facility] [address] [place] [address], [address] Phone: [phone] Fax: [phone] [facility] ATTENDING ADDENDUM Patient was seen and examined in person. Data and notes were reviewed. I have discussed and agree with the plan as noted in the NP note above without further additions. Isauro Parson MD PhD 
[facility] [address] [place] [address]

## 2020-01-28 NOTE — CONSULTS
Pulmonary, Critical Care, and Sleep Medicine~Progress Note Name: Christoph Butterfield MRN: [id number] : 1950 Hospital: [address] Date: 2020 11:05 AM Admission: 10/25/2019 Impression Plan 1. Abnormal CT/PET scan; SUV 5.6 mediastinal lymph node, SUV RML nodule 5.9.  
2. COPD, not bronchospastic 3. Acute on chronic systolic HF and ICM; EF 15-89%, now with LVAD 4. Hx of VF arrest  
5. CAD, s/p CABG in  6. Hx of DVT  1. PET scan ordered secondary to oncology recommendations. PET scan with hypermetabolic findings. 2. We will follow up CT scan in 4 wks to reassess findings with Dr Janie Stone 3. Recommend oncology follow up 4. Discussed with Attending Daily Progression: 
 
Sounds ok I have reviewed the labs and previous days notes. Pertinent items are noted in HPI. OBJECTIVE: 
 
 Vital Signs: 
    
Visit Vitals BP 91/72 (BP 1 Location: Left arm, BP Patient Position: At rest) Pulse 80 Temp 97.6 °F (36.4 °C) Resp 18 Ht 6' 2\" (1.88 m) Wt 83.9 kg (184 lb 15.5 oz) SpO2 98% BMI 23.75 kg/m² Temp (24hrs), Av °F (36.7 °C), Min:97.6 °F (36.4 °C), Max:98.6 °F (37 °C) Intake/Output:  
  Last shift: No intake/output data recorded. Last 3 shifts:  190 -  0700 In: 1250 [P.O.:1050; I.V.:200] Out: 1125 [KCW:0118] Intake/Output Summary (Last 24 hours) at 2020 1105 Last data filed at 2020 2683 Gross per 24 hour Intake 750 ml Output 375 ml Net 375 ml Physical Exam:                                       
Exam Findings Other General: No resp distress noted, appears stated age HEENT:  No ulcers, JVD not elevated, no cervical LAD Chest: No pectus deformity, normal chest rise b/l HEART:  Mechanical sounds Lungs:  CTA b/l, no rhonchi/crackles/wheeze, diminished BS at bases ABD: Soft/NT, non rigid mildly distended EXT: No cyanosis/clubbing/edema, normal peripheral pulses Skin: No rashes or ulcers, no mottling Neuro: A/O x 3 Medications: 
Current Facility-Administered Medications Medication Dose Route Frequency  warfarin (COUMADIN) tablet 2 mg  2 mg Oral ONCE  clonazePAM (KlonoPIN) tablet 0.25 mg  0.25 mg Oral TID  albuterol-ipratropium (DUO-NEB) 2.5 MG-0.5 MG/3 ML  3 mL Nebulization TID RT  
 acetylcysteine (MUCOMYST) 200 mg/mL (20 %) solution 600 mg  600 mg Nebulization TID RT  
 arformoteroL (BROVANA) neb solution 15 mcg  15 mcg Nebulization BID RT And  
 budesonide (PULMICORT) 500 mcg/2 ml nebulizer suspension  500 mcg Nebulization BID RT  
 albumin human 25% (BUMINATE) solution 12.5 g  12.5 g IntraVENous DAILY  [START ON 1/29/2020] epoetin yvonne-epbx (RETACRIT) injection 20,000 Units  20,000 Units SubCUTAneous Q MON, WED & FRI  sodium chloride tablet 1 g  1 g Oral BID WITH MEALS  potassium chloride SR (KLOR-CON 10) tablet 10 mEq  10 mEq Oral DAILY  sodium chloride (NS) flush 5-40 mL  5-40 mL IntraVENous PRN  
 naloxone (NARCAN) injection 0.4 mg  0.4 mg IntraVENous PRN  
 hydrALAZINE (APRESOLINE) 20 mg/mL injection 20 mg  20 mg IntraVENous Q6H PRN  
 clonazePAM (KlonoPIN) tablet 0.25 mg  0.25 mg Oral QHS PRN  
 meropenem (MERREM) 500 mg in 0.9% sodium chloride (MBP/ADV) 50 mL  0.5 g IntraVENous Q6H  
 levETIRAcetam (KEPPRA) tablet 250 mg  250 mg Oral BID  senna-docusate (PERICOLACE) 8.6-50 mg per tablet 1 Tab  1 Tab Oral PRN  
 collagenase (SANTYL) 250 unit/gram ointment   Topical DAILY  fludrocortisone (FLORINEF) tablet 0.1 mg  0.1 mg Oral DAILY  cholecalciferol (VITAMIN D3) (1000 Units /25 mcg) tablet 2 Tab  2,000 Units Oral DAILY  venlafaxine-SR (EFFEXOR-XR) capsule 150 mg  150 mg Oral DAILY WITH BREAKFAST  hydrocortisone (CORTAID) 1 % cream   Topical TID PRN  thiamine HCL (B-1) tablet 100 mg  100 mg Oral DAILY  oxybutynin (DITROPAN) tablet 5 mg  5 mg Oral Q6H PRN  
 magnesium oxide (MAG-OX) tablet 800 mg  800 mg Oral BID  lidocaine (URO-JET/ GLYDO) 2 % jelly   Urethral PRN  
 albuterol-ipratropium (DUO-NEB) 2.5 MG-0.5 MG/3 ML  3 mL Nebulization Q6H PRN  therapeutic multivitamin (THERAGRAN) tablet 1 Tab  1 Tab Oral DAILY  alteplase (CATHFLO) 1 mg in sterile water (preservative free) 1 mL injection  1 mg InterCATHeter PRN  finasteride (PROSCAR) tablet 5 mg  5 mg Oral DAILY  diphenhydrAMINE (BENADRYL) capsule 25 mg  25 mg Oral QHS PRN  
 octreotide (SANDOSTATIN) injection 25 mcg  25 mcg SubCUTAneous TID  pantoprazole (PROTONIX) tablet 40 mg  40 mg Oral ACB  lactobac ac& pc-s.therm-b.anim (TIAN Q/RISAQUAD)  1 Cap Oral DAILY  oxyCODONE IR (ROXICODONE) tablet 5 mg  5 mg Oral Q6H PRN  
 tamsulosin (FLOMAX) capsule 0.4 mg  0.4 mg Oral DAILY  Warfarin MD/NP dosing   Other PRN  
 sodium chloride (NS) flush 5-40 mL  5-40 mL IntraVENous Q8H  
 sodium chloride (NS) flush 5-40 mL  5-40 mL IntraVENous PRN  
 acetaminophen (TYLENOL) tablet 650 mg  650 mg Oral Q6H PRN  
 naloxone (NARCAN) injection 0.4 mg  0.4 mg IntraVENous PRN  
 ondansetron (ZOFRAN) injection 4 mg  4 mg IntraVENous Q4H PRN  
 sucralfate (CARAFATE) tablet 1 g  1 g Oral AC&HS  influenza vaccine 2019-20 (6 mos+)(PF) (FLUARIX/FLULAVAL/FLUZONE QUAD) injection 0.5 mL  0.5 mL IntraMUSCular PRIOR TO DISCHARGE Labs: ABG No results for input(s): PHI, PCO2I, PO2I, HCO3I, SO2I, FIO2I in the last 72 hours. [address] Recent Labs  
  01/28/20 
0435 01/27/20 
1018 01/26/20 
0352 WBC 3.9* 4.5 3.4* HGB 10.1* 10.3* 9.8* HCT 33.8* 34.5* 33.1*  
* 144* 136* MCV 98.3 98.6 99.1*  
MCH 29.4 29.4 29.3 Metabolic Panel Recent Labs  
  01/28/20 
0435 01/27/20 
1018 01/26/20 
0352  137 140  
K 4.0 4.6 4.3  105 106 CO2 29 29 29 * 83 94 BUN 18 17 18 CREA 0.99 1.05 1.03  
CA 8.7 8.7 8.7 MG 2.0  --  2.0 ALB 2.7* 2.8* 2.6* SGOT 14* 15 15 ALT 11* 12 13 INR 1.4* 1.4* 1.8* Pertinent Labs Darrion Soria PA-C 
1/28/2020

## 2020-01-28 NOTE — WOUND CARE
Wound Care Note: Follow-up visit for sternal wound Chart shows: 
Admitted for acute decompensated heart failure and tremor s/p right axillary exploration with construction of 10 mm chimney graft used for introduction of a percutaneous left ventricular assist device with insertion of same 10/30/19 s/p cystoscopy 11/13/19 and left brachial artery thrombectomy 11/25/19 s/p removal of temporary left ventricular assist device, implantation of left ventricular assist device permanent 1/21/20 Past Medical History:  
Diagnosis Date  Degenerative disc disease, lumbar  Heart failure ([address]  High cholesterol  Hypertension  Paroxysmal atrial fibrillation ([facility] [address] 4/2/2019  Spinal stenosis WBC = 3.9 on 1/28/20 Assessment:  
Patient is very groggy today, fell asleep during wound care, continent with assistance needed in repositioning. Bed: Total Care Diet: Regular with nutritional supplements and snacks Patient reports no pain. Bilateral heels skin intact and without erythema. Patient was not turned to assess sacrum or buttocks. Nurse states skin good. 1. Sternal wound is improving, now 30% pink and 60% slough, measures 1 cm x 1.2 cm x 0.3 cm, scant serous drainage, wound edges are open, david-wound intact. Santyl moist to dry dressing applied. Patient was not repositioned. Heels offloaded on pillow. Recommendations:   
Continue with current wound care. 
  
Sternal wound-  Daily cleanse with normal saline, wipe wound bed clean and dry, apply nickel thickness of Santyl ointment, apply small piece of gauze that has been moistened with normal saline, cover with gauze.  
 
Skin Care & Pressure Prevention: 
Minimize layers of linen/pads under patient to optimize support surface. Turn/reposition approximately every 2 hours and offload heels. Manage incontinence / promote continence Nourishing Skin Cream to dry skin, minimize use of briefs when able Discussed above plan with patient & [address], RN Transition of Care: Plan to follow as needed while admitted to hospital. 
 
Hyla Fothergill" Leamon Abraham, BSN, RN, [facility], [facility]. 
office [phone] 
pager [phone] or call  to page

## 2020-01-28 NOTE — PROGRESS NOTES
Problem: Self Care Deficits Care Plan (Adult) Goal: *Acute Goals and Plan of Care (Insert Text) Description FUNCTIONAL STATUS PRIOR TO ADMISSION: Patient was modified independent using a single point cane for functional mobility. Patient able to shower and dress himself. However, patient required assistance for household management from his wife. HOME SUPPORT: The patient lived alone with wife to provide assistance. Continue all goals 1/24/2020 Occupational Therapy Goals all updated/continued as follows 1/17/2020 1. Patient will perform upper body dressing including management of LVAD with supervision/setup within 7 day(s) 2. Patient will perform lower body dressing with RW CGA within 7 day(s). -continue goal (MOD A simulated 1/17) 3. Patient will perform grooming standing with RW 2 mins with supervision/setup within 7 day(s). -continue goal (CGA 1/17) 4. Patient will perform toilet transfers with RW supervision/setup using LRAD within 7 day(s). 5.  Patient will perform all aspects of toileting with RW with minimal assistance within 7 day(s). 6.  Patient will perform gathering ADL items high and waist height 2/2 with RW supervision within 7 days. -continue goal (CGA 1/17) Occupational Therapy Goals all updated 1/10/2020 1. Patient will perform upper body dressing including management of LVAD with min A within 7 day(s) 2. Patient will perform lower body dressing with RW CGA within 7 day(s). 3.  Patient will perform grooming standing with RW 2 mins with supervision/setup within 7 day(s). 4.  Patient will perform toilet transfers with RW minimum assistance within 7 day(s). 5.  Patient will perform all aspects of toileting with RW with moderate assistance within 7 day(s). 6.  Patient will perform gathering ADL items high and waist height 2/2 with RW supervision within 7 days. Continue all goals 10/30/19 post re-eval for Impella removal  
Initiated 10/28/2019 1.  Patient will perform bathing with supervision/set-up within 7 day(s). 2.  Patient will perform lower body dressing with supervision/set-up within 7 day(s). 3.  Patient will perform grooming with modified independence within 7 day(s). 4.  Patient will perform toilet transfers with modified independence within 7 day(s). 5.  Patient will perform all aspects of toileting with supervision/set-up within 7 day(s). 6.  Patient will participate in upper extremity therapeutic exercise/activities with supervision/set-up for 5 minutes within 7 day(s). 7.  Patient will utilize energy conservation techniques during functional activities with verbal cues within 7 day(s). Outcome: Progressing Towards Goal 
 OCCUPATIONAL THERAPY TREATMENT Patient: Fiona Lama (75 y.o. male) Date: 1/28/2020 Diagnosis: Acute decompensated heart failure ([facility] [address] [I50.9] <principal problem not specified> Procedure(s) (LRB): 
RIGHT HEART CATH (N/A) 7 Days Post-Op Precautions: Fall Chart, occupational therapy assessment, plan of care, and goals were reviewed. ASSESSMENT Patient continues with skilled OT services and is progressing towards goals. However, patient continues to be limited by impaired coordination with increasing tremors (especially with fatigue), generalized weakness, decreased endurance, decreased activity tolerance, impaired balance, and poor carryover of \"move in the tube\" sternal precautions and LVAD management. Patient ranges from largely MIN A-MOD A for LB dressing, UB dressing, and toileting with inconsistent LVAD understanding (patient can occasionally require MAX A to stand). Patient highly motivated to regain independence. Continue to recommend inpatient rehab to maximize patient safety and independence for ADL transfers and tasks.   
 
Other factors to consider for discharge: LVAD HM III 
    
 
PLAN : 
Patient continues to benefit from skilled intervention to address the above impairments. Continue treatment per established plan of care. to address goals. Recommend with staff: OOb x 3 to chair; BUE exercises; LVAD switchovers x 3 Recommend next OT session: item retrieval 
 
Recommendation for discharge: (in order for the patient to meet his/her long term goals) Therapy 3 hours per day 5-7 days per week This discharge recommendation: 
Has been made in collaboration with the attending provider and/or case management IF patient discharges home will need the following DME: TBD SUBJECTIVE:  
Patient stated I am shaky today.  OBJECTIVE DATA SUMMARY:  
Cognitive/Behavioral Status: 
Neurologic State: Alert Orientation Level: Oriented X4 Cognition: Appropriate for age attention/concentration Perception: Appears intact Perseveration: No perseveration noted Safety/Judgement: Decreased insight into deficits; Decreased awareness of need for safety;Decreased awareness of need for assistance Functional Mobility and Transfers for ADLs: 
Bed Mobility: 
Supine to Sit: Stand-by assistance Transfers: 
Sit to Stand: Moderate assistance;Assist x1;Additional time Balance: 
Sitting: Impaired; Without support Sitting - Static: Good (unsupported) Sitting - Dynamic: Fair (occasional) Standing: Impaired; With support Standing - Static: Fair Standing - Dynamic : Poor ADL Intervention: 
  
 
  
 
  
 
  
 
  
 
  
 
  
 
Cognitive Retraining Safety/Judgement: Decreased insight into deficits; Decreased awareness of need for safety;Decreased awareness of need for assistance Patient instructed and educated on mindful movement principles based on Move in The Tube concept to include maintaining bilateral elbows close to rib cage when performing any load-bearing activity such as getting in/out of bed, pushing up from a chair, opening a door, or lifting a box.   Patient was given a handout with diagrams of each correct/incorrect method of performing each of the above tasks. Patient instructed and encouraged to mobilize bilateral upper extremities outside the tube when doing any non-load bearing activity such as washing hair/body, brushing teeth, retrieving clothing items, or scratching your back. Increase activity tolerance for home, work, and sexual intercourse by pacing self with increasing the arm exercises, sitting duration, frequency OOB, walking, standing, and ADLs. Instructed and indicated understanding of s/s of too much activity, how to respond to s/s safely. Therapeutic Exercises:  
Patient instructed on the benefits and demonstrated cardiac exercises while standing for first seat x MOD A and seated for second set with SBA. Patient encouraged to perform a third set seated with his wife present. CARDIAC EXERCISE Sets Reps Active  Active Assist   
Passive  Self ROM Comments Shoulder flexion  2  5   [x]                            []                             []                             []                               
Shoulder abduction  2 5  [x]                             []                             []                             []                               
Scapular elevation  2 5  [x]                             []                              []                             []                               
Scapular retraction  2  5  [x]                             []                             []                             []                               
Trunk rotation  2  5  [x]                             []                             []                             []                               
 
Activity Tolerance:  
Fair and requires rest breaks Please refer to the flowsheet for vital signs taken during this treatment. After treatment patient left in no apparent distress:  
Sitting in chair and Call bell within reach COMMUNICATION/COLLABORATION:  
 The patients plan of care was discussed with: Physical Therapist and Registered Nurse Janice Grider Time Calculation: 34 mins

## 2020-01-28 NOTE — PROGRESS NOTES
Problem: Mobility Impaired (Adult and Pediatric) Goal: *Acute Goals and Plan of Care (Insert Text) Description FUNCTIONAL STATUS PRIOR TO ADMISSION: Patient was modified independent using a single point cane for functional mobility. Patient reports an increasingly sedentary lifestyle 2* fatigue and SOB/dyspnea. Retired (so is his wife). Patient reports x 3 falls within the last couple of weeks. Patient is wearing either nasal cannula or CPAP at night. LVAD work-up has been initiated. HOME SUPPORT PRIOR TO ADMISSION: The patient lived with his wife, but did not require physical assistance. Physical Therapy Goals: 
 
Reassessment completed 1/23/2020 and goals upgraded as appropriate. 1.  Patient will move from supine to sit and sit to supine, scoot up and down and roll side to side in bed with supervision within 7 days. 2.  Patient will perform sit to/from stand with contact guard assistance x 1 within 7 days. 3.  Patient will ambulate 50 feet, twice, with least restrictive assistive device and moderate assistance within 7 days. 4.  Stair goal to be formulated when appropriate. 5.  Patient will perform cardiac exercises per protocol with independence and verbal cuing only for performance remembrance BID within 7 days. 6.  Patient will verbally and functionally recall mindful movement principles (Move in the Tube) with minimal verbal cuing/reminding within 7 days. 7.  Patient will perform power exchange for power module to/from battery with verbal cuing within 7 days. Reassessed 1/17/2020 and goals upgraded as approporiate Goals upgraded as appropriate 1/07/2020- Goals appropriate 1/9/2020 on weekly reassessment 1. Patient will move from supine to sit and sit to supine, scoot up and down and roll side to side in bed with supervision within 7 days. 2.  Patient will perform sit to/from stand with minimal assistance x 1 within 7 days. - Upgrade to contact guard assist 1/17/2020 3.  Patient will ambulate 50 feet with least restrictive assistive device and moderate assistance within 7 days. 4.  Stair goal to be formulated when appropriate. 5.  Patient will perform cardiac exercises per protocol with supervision within 7 days. 6.  Patient will verbally and functionally recall mindful movement principles (Move in the Tube) with minimal verbal cuing/reminding within 7 days. 7.  Patient will perform power exchange for power module to/from battery with minimal assistance within 7 days. - Upgrade to verbal cuing only 1/17/2020 Reassessed on 1/2/2020 and goals not met, carry-over. Reassessed on 12/26/2019, goals not met but remain appropriate, so carry over Weekly reassessment completed 12/19/2019 and goals downgraded as appropriate. 1.  Patient will move from supine to sit and sit to supine, scoot up and down and roll side to side in bed with contact guard assistance within 7 days. 2.  Patient will perform sit to/from stand with minimal assistance x 1 within 7 days. 3.  Patient will ambulate 25 feet with least restrictive assistive device and moderate assistance within 7 days. 4.  Stair goal to be formulated when appropriate. 5.  Patient will perform cardiac exercises per protocol with supervision within 7 days. 6.  Patient will verbally and functionally recall mindful movement principles (Move in the Tube) with minimal verbal cuing/reminding within 7 days. 7.  Patient will perform power exchange for power module to/from battery with moderate assistance within 7 days. Re-evaluation completed 11/20/2019 and new goals formulated following LVAD implantation. Reviewed and cont 12/3/2019. Reviewed and continued 12/12/2019 1. Patient will move from supine to sit and sit to supine, scoot up and down and roll side to side in bed with minimal assistance/contact guard assist within 7 days.    
2.  Patient will perform sit to/from stand with minimal assistance/contact guard assist within 7 days. 3.  Patient will ambulate 150 feet with least restrictive assistive device and minimal assistance/contact guard assist within 7 days. 4.  Patient will ascend/descend 4 stairs with handrail(s) with minimal assistance/contact guard assist within 7 days. 5.  Patient will perform cardiac exercises per protocol with supervision within 7 days. 6.  Patient will verbally and functionally recall 3/3 sternal precautions within 7 days. 7.  Patient will perform power exchange for power module to/from battery with moderate assistance  within 7 days. Initiated 10/27/2019; updated 11/15/2019 1. Patient will move from supine to sit and sit to supine, scoot up and down and roll side to side in bed with independence within 7 days. 2.  Patient will perform sit to/from stand with supervision/set-up within 7 days. 3.  Patient will ambulate 200 feet with least restrictive assistive device and supervision/set-up within 7 days. 4.  Patient will ascend/descend 4 stairs with  handrail(s) with supervision/set-up within 7 days for functional strengthening and community reintegration. Scot Jaffe 5.  Patient will verbally and functionally recall 3/3 sternal precautions within 7 days in preparation for LVAD implantation. 6.  Patient will perform a mock power exchange for power module to/from battery with supervision/set-up within 7 days in preparation for LVAD implantation. 1.  Patient will move from supine to sit and sit to supine, scoot up and down and roll side to side in bed with independence within 7 days. 2.  Patient will perform sit to/from stand with supervision/set-up within 7 days. 3.  Patient will ambulate 150 feet with least restrictive assistive device and supervision/set-up within 7 days. 4.  Patient will ascend/descend 4 stairs with  handrail(s) with supervision/set-up within 7 days for functional strengthening and community reintegration. Scot Jaffe 5.  Patient will verbally and functionally recall 3/3 sternal precautions within 7 days in preparation for LVAD implantation. 6.  Patient will perform a mock power exchange for power module to/from battery with supervision/set-up within 7 days in preparation for LVAD implantation. Outcome: Progressing Towards Goal 
  
PHYSICAL THERAPY TREATMENT Patient: Rebecca Frost (75 y.o. male) Date: 1/28/2020 Diagnosis: Acute decompensated heart failure ([facility] [address] [I50.9] <principal problem not specified> Procedure(s) (LRB): 
RIGHT HEART CATH (N/A) 7 Days Post-Op Precautions: Fall Chart, physical therapy assessment, plan of care and goals were reviewed. ASSESSMENT Patient continues with skilled PT services and is progressing towards goals. Pt was able to ambulate but required multiple seated rest breaks. Pt had increase difficulties with foot clearance with fatigue. Pt reports tremors are limiting mobility also. Pt required increase in v.c for change over to batteries and location of batteries and controllers. MAP supine 90,MAP sitting 80, MAP post gait 80 Current Level of Function Impacting Discharge (mobility/balance): min to mod A x2 Other factors to consider for discharge: decrease activity tolerance, balance and strength. LVAD PLAN : 
Patient continues to benefit from skilled intervention to address the above impairments. Continue treatment per established plan of care. to address goals. Recommendation for discharge: (in order for the patient to meet his/her long term goals) Therapy 3 hours per day 5-7 days per week This discharge recommendation: 
Has been made in collaboration with the attending provider and/or case management IF patient discharges home will need the following DME: to be determined (TBD) SUBJECTIVE:  
Patient stated I don't know why I am not doing well.  OBJECTIVE DATA SUMMARY:  
 
 
Critical Behavior: 
Neurologic State: Alert Orientation Level: Oriented X4 Cognition: Appropriate decision making, Appropriate for age attention/concentration, Appropriate safety awareness, Follows commands Safety/Judgement: Decreased awareness of need for safety Functional Mobility Training: 
Bed Mobility: 
  
Supine to Sit: Stand-by assistance Transfers: 
Sit to Stand: Minimum assistance; Moderate assistance Stand to Sit: Minimum assistance Balance: 
Sitting: Impaired Sitting - Static: Good (unsupported) Standing: Impaired Standing - Static: Fair Standing - Dynamic : Poor Ambulation/Gait Training: 
Distance (ft): 20 Feet (ft)(x3 trials seated rest breaks and then 50feet ) Assistive Device: Gait belt;Walker, rolling Ambulation - Level of Assistance: Minimal assistance; Moderate assistance;Assist x2 Gait Abnormalities: Ataxic;Decreased step clearance;Shuffling gait Base of Support: Center of gravity altered Stairs: VAD power transition Patient performed switchover from power module to battery. Completed the following tasks: 
 Independent Verbal cues Physical assist Comments Don holster vest   x Check batteries  x Check  Ensure  clipped onto stabilization belt  x Position batteries in clips  x Disconnect power leads Insert power lead into battery x [employer]ons batteries in holster   x Secure batteries with velcro and clips x Patient performed switchover from battery to power module. Completed the following tasks: 
 Independent Verbal cues Physical assistance Comments Remove batteries from [employer] Disconnect power leads from battery Reconnect power leads into power module Remove batteries from clips Doailyn holster vest      
 
 
Pain Rating: No complaints Activity Tolerance:  
Limited Please refer to the flowsheet for vital signs taken during this treatment. After treatment patient left in no apparent distress:  
Sitting in chair and Call bell within reach COMMUNICATION/COLLABORATION:  
The patients plan of care was discussed with: Registered Nurse Delio Yun PTA Time Calculation: 39 mins

## 2020-01-28 NOTE — PROGRESS NOTES
Problem: Pain Goal: *Control of Pain Outcome: Progressing Towards Goal 
  
Problem: Pressure Injury - Risk of 
Goal: *Prevention of pressure injury Description Document Mich Scale and appropriate interventions in the flowsheet. Offload heels Turn approximately every 2 hours Outcome: Progressing Towards Goal 
Note: Pressure Injury Interventions: 
Sensory Interventions: Assess changes in LOC, Keep linens dry and wrinkle-free Moisture Interventions: Absorbent underpads, Minimize layers Activity Interventions: Chair cushion, Increase time out of bed, PT/OT evaluation, Pressure redistribution bed/mattress(bed type) Mobility Interventions: Pressure redistribution bed/mattress (bed type), PT/OT evaluation Nutrition Interventions: Document food/fluid/supplement intake, Offer support with meals,snacks and hydration Friction and Shear Interventions: Lift sheet, Minimize layers Problem: Heart Failure: Discharge Outcomes Goal: *Left ventricular function assessment completed prior to or during stay, or planned for post-discharge Outcome: Progressing Towards Goal 
Goal: *ACEI prescribed if LVEF less than 40% and no contraindications or ARB prescribed Outcome: Progressing Towards Goal 
Goal: *Verbalizes understanding and describes prescribed diet Outcome: Progressing Towards Goal 
Goal: *Verbalizes understanding/describes prescribed medications Outcome: Progressing Towards Goal 
Goal: *Describes available resources and support systems Description 
(eg: Home Health, Palliative Care, Advanced Medical Directive) Outcome: Progressing Towards Goal 
Goal: *Understands and describes signs and symptoms to report to providers(Stroke Metric) Outcome: Progressing Towards Goal 
Goal: *Describes/verbalizes understanding of follow-up/return appt Description 
(eg: to physicians, diabetes treatment coordinator, and other resources Outcome: Progressing Towards Goal 
 Goal: *Describes importance of continuing daily weights and changes to report to physician Outcome: Progressing Towards Goal 
  
Problem: Patient Education: Go to Patient Education Activity Goal: Patient/Family Education Outcome: Progressing Towards Goal 
  
Problem: Infection - Risk of, Urinary Catheter-Associated Urinary Tract Infection Goal: *Absence of infection signs and symptoms Outcome: Progressing Towards Goal 
  
Problem: LVAD: Discharge Outcomes Goal: *Hemodynamically stable Outcome: Progressing Towards Goal 
Goal: *Lungs clear or at baseline Outcome: Progressing Towards Goal 
Goal: *Optimal pain control at patient's stated goal 
Outcome: Progressing Towards Goal 
Goal: *Demonstrates progressive activity Outcome: Progressing Towards Goal 
Goal: *Tolerating diet Outcome: Progressing Towards Goal 
Goal: *LVAD parameters within set limits Outcome: Progressing Towards Goal 
Goal: *Verbalizes and demonstrates incision care Outcome: Progressing Towards Goal 
Goal: *Demonstrates effective coping Outcome: Progressing Towards Goal 
Goal: *Patient/caregiver is able to perform drive line dressing change independently Outcome: Progressing Towards Goal 
Goal: *LVAD drive line site without signs and symptoms of wound complications Outcome: Progressing Towards Goal 
Goal: *LVAD skills board complete Outcome: Progressing Towards Goal 
  
Problem: LVAD: Discharge Outcomes Goal: *Hemodynamically stable Outcome: Progressing Towards Goal 
Goal: *Lungs clear or at baseline Outcome: Progressing Towards Goal 
Goal: *Optimal pain control at patient's stated goal 
Outcome: Progressing Towards Goal 
Goal: *Demonstrates progressive activity Outcome: Progressing Towards Goal 
Goal: *Tolerating diet Outcome: Progressing Towards Goal 
Goal: *LVAD parameters within set limits Outcome: Progressing Towards Goal 
Goal: *Verbalizes and demonstrates incision care Outcome: Progressing Towards Goal 
 Goal: *Demonstrates effective coping Outcome: Progressing Towards Goal 
Goal: *Patient/caregiver is able to perform drive line dressing change independently Outcome: Progressing Towards Goal 
Goal: *LVAD drive line site without signs and symptoms of wound complications Outcome: Progressing Towards Goal 
Goal: *LVAD skills board complete Outcome: Progressing Towards Goal 
  
Problem: LVAD: Discharge Outcomes Goal: *Hemodynamically stable Outcome: Progressing Towards Goal 
Goal: *Lungs clear or at baseline Outcome: Progressing Towards Goal 
Goal: *Optimal pain control at patient's stated goal 
Outcome: Progressing Towards Goal 
Goal: *Demonstrates progressive activity Outcome: Progressing Towards Goal 
Goal: *Tolerating diet Outcome: Progressing Towards Goal 
Goal: *LVAD parameters within set limits Outcome: Progressing Towards Goal 
Goal: *Verbalizes and demonstrates incision care Outcome: Progressing Towards Goal 
Goal: *Demonstrates effective coping Outcome: Progressing Towards Goal 
Goal: *Patient/caregiver is able to perform drive line dressing change independently Outcome: Progressing Towards Goal 
Goal: *LVAD drive line site without signs and symptoms of wound complications Outcome: Progressing Towards Goal 
Goal: *LVAD skills board complete Outcome: Progressing Towards Goal

## 2020-01-28 NOTE — PROGRESS NOTES
0730: Bedside shift change report given to Alex Dominguez (oncoming nurse) by Varsha Henry (offgoing nurse). Report included the following information SBAR and Kardex. 1930: Bedside shift change report given to Varsha Henry (oncoming nurse) by [address] RN (offgoing nurse). Report included the following information SBAR and Kardex. Problem: Falls - Risk of 
Goal: *Absence of Falls Description Document Alessandra Pierce Fall Risk and appropriate interventions in the flowsheet. Outcome: Progressing Towards Goal 
Note: Fall Risk Interventions: 
Mobility Interventions: Communicate number of staff needed for ambulation/transfer, Patient to call before getting OOB Mentation Interventions: Adequate sleep, hydration, pain control Medication Interventions: Assess postural VS orthostatic hypotension Elimination Interventions: Call light in reach, Patient to call for help with toileting needs History of Falls Interventions: Evaluate medications/consider consulting pharmacy

## 2020-01-29 NOTE — PROGRESS NOTES
Transitions of Care Plan: 
   Discharge pending bed availability at [address] [place] [address] Transportation:  1362 [facility] 0820 -  received call from [facility] (p: [phone]); requested updated clinicals. [facility] has a potential bed availability today. 1 - CM faxed updated clinicals to Aidan Guadalupe at [address] [place] [address] (f: [phone]). 0830 - CM updated [facility] team - patient medically stable for discharge if bed is available. [address] - AP received update from [facility] at [address] [place] [address]. Bed did NOT become available today; there are a few planned discharges from facility tomorrow. Hopeful there will be a bed tomorrow. [facility] will call CM in the morning with an update. 0945 - CM updated [facility] RN and [facility] NP of above information. Hematology/Oncology to see patient this morning. CM will continue to follow.  
 
Boris Piedra, MPH

## 2020-01-29 NOTE — PROGRESS NOTES
01/29/20 1050 01/29/20 1051 01/29/20 1052 Vital Signs MAP (Monitor) 92 [phone] 74 BP 1 Method Doppler Doppler Doppler BP 1 Location Left arm Left arm Left arm BP Patient Position Supine Sitting Standing Patient c/o dizziness and weakness throughout all positions during orthostatic blood pressures. Patient also very tremorous.

## 2020-01-29 NOTE — PROGRESS NOTES
4081 [facility] 2303 [facility] in [place], [place] Inpatient Progress Note Patient name: Natasha Flanagan Patient : 1950 Patient MRN: [id number] Attending MD: Alicia Triana MD 
Date of service: 20 CHIEF COMPLAINT: 
De azucena LVAD implant 
  
PLAN: 
Continue current device speed at 5800rpm 
Repeat TTE Continue florinef Discontinue salt tablets Intolerant of BB due to RV failure Intolerant of ACEi/ARB/ARNI/AA due to JUAN J on CKD 3 Intolerant of spironolactone d/t IVVD Continue IV antibiotics per ID; will need Merrem until ~, then transition to suppressive therapy with cipro x 6-12 months (possibly lifetime) Persistently elevated CEA; PET scan shows increased activity RML of lung. 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 Rin Fraga would like to pull sternal wire once stable ~3 months from implant () INR 1.5 - 4 mg warfarin tonight Continue physical therapy Nutritional consult; persistently low prealbumin Tentative discharge to rehabtomorrow pending bed availability  
  
IMPRESSION: 
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 
  
CARDIAC IMAGING: 
Chest CTA- no outflow graft occlusion Pet Scan equivocal for amyloid Send [facility]- pending  
  
OTHER IMAGING: 
Head CT negative for acute process EEG suggestive of mild generalized encephalopathic process, possibly related to underlying structural brain injury vs metabolic abnormality 
  
INTERVAL HISTORY: 
No issues overnight Occasional PI events Hemodynamics at goal, MAP 80-90s, HR 70s Appears mildly volume overloaded INR 1.5 
CEA 5.4 LVAD INTERROGATION: 
Device interrogated in person Device function normal, normal flow, no events LVAD Pump Speed (RPM): 5800 Pump Flow (LPM): 4.6 MAP: 122 PI (Pulsitility Index): 5.2 Power: 4.2  Test: Yes 
Back Up  at Bedside & Labeled: Yes Power Module Test: Yes Driveline Site Care: No 
Driveline Dressing: Clean, Dry, and Intact Outpatient: No 
MAP in Therapeutic Range (Outpatient): Yes Testing Alarms Reviewed: Yes 
Back up SC speed: 5800 Back up Low Speed Limit: 5400 Emergency Equipment with Patient?: Yes Emergency procedures reviewed?: Yes Drive line site inspected?: Yes Drive line intergrity inspected?: Yes Drive line dressing changed?: No 
 
PHYSICAL EXAM: 
Visit Vitals BP 91/72 (BP 1 Location: Left arm, BP Patient Position: At rest) Pulse (!) 111 Temp 97.6 °F (36.4 °C) Resp 16 Ht 6' 2\" (1.88 m) Wt 184 lb 8.4 oz (83.7 kg) SpO2 99% BMI 23.69 kg/m² General: Patient is well developed, well-nourished in no acute distress HEENT: Normocephalic and atraumatic. No scleral icterus.  Pupils are equal, round and reactive to light and accomodation. No conjunctival injection. Oropharynx is clear. Flushed. Neck: Supple. No evidence of thyroid enlargements or lymphadenopathy. JVD: Cannot be appreciated Lungs: Breath sounds are equal and clear bilaterally. No wheezes, rhonchi, or rales. Heart: Regular rate and rhythm with normal S1 and S2. No murmurs, gallops or rubs. Abdomen: Soft, no mass or tenderness. No organomegaly or hernia. Bowel sounds present. Genitourinary and rectal: deferred Extremities: No cyanosis, clubbing, or edema. Neurologic: No focal sensory or motor deficits are noted. Grossly intact. Psychiatric: Awake, alert an doriented x 3. Appropriate mood and affect. Skin: Warm, dry and well perfused. No lesions, nodules or rashes are noted. REVIEW OF SYSTEMS: 
General: Denies fever, night sweats. Ear, nose and throat: Denies difficulty hearing, sinus problems, runny nose, post-nasal drip, ringing in ears, mouth sores, loose teeth, ear pain, nosebleeds, sore throate, facial pain or numbess Cardiovascular: see above in the interval history Respiratory: Denies cough, wheezing, sputum production, hemoptysis. Gastrointestinal: Denies heartburn, constipation, intolerance to certain foods, diarrhea, abdominal pain, nausea, vomiting, difficulty swallowing, blood in stool Kidney and bladder: Denies painful urination, frequent urination, urgency, prostate problems and impotence Musculoskeletal: Denies joint pain, muscle weakness Skin and hair: Denies change in existing skin lesions, hair loss or increase, breast changes PAST MEDICAL HISTORY: 
Past Medical History:  
Diagnosis Date  Degenerative disc disease, lumbar  Heart failure ([address]  High cholesterol  Hypertension  Paroxysmal atrial fibrillation ([address] 4/2/2019  Spinal stenosis PAST SURGICAL HISTORY: 
Past Surgical History:  
Procedure Laterality Date  COLONOSCOPY Left 11/11/2019 COLONOSCOPY at bedside performed by Gideon Singletary MD at [address]  HX CORONARY ARTERY BYPASS GRAFT    
 triple  HX HERNIA REPAIR    
 HX IMPLANTABLE CARDIOVERTER DEFIBRILLATOR  ND CARDIOVERSION ELECTIVE ARRHYTHMIA EXTERNAL N/A 6/10/2019 EP CARDIOVERSION performed by Gigi Saint, MD at [address], [facility] [address] CATH LAB  ND CARDIOVERSION ELECTIVE ARRHYTHMIA EXTERNAL N/A 2019 EP CARDIOVERSION performed by Tianna Lira MD at [address] [facility] [address] CATH LAB  ND INSJ/RPLCMT PERM DFB W/TRNSVNS LDS 1/DUAL CHMBR N/A 2019 INSERT ICD BIV MULTI performed by Blane Vargas MD at [address] [facility] CATH LAB  ND TCAT IMPL WRLS P-ART PRS SNR L-T HEMODYN MNTR N/A 2019 IMPLANT HEART FAILURE MONITORING DEVICE performed by Missael Agosto MD at [address] [facility] CATH LAB FAMILY HISTORY: 
Family History Problem Relation Age of Onset  Lung Disease Mother  Hypertension Mother Canseco Arthritis-osteo Mother  Heart Disease Mother  Heart Disease Father  Diabetes Father SOCIAL HISTORY: 
Social History Socioeconomic History  Marital status:  Spouse name: Not on file  Number of children: Not on file  Years of education: Not on file  Highest education level: Not on file Tobacco Use  Smoking status: Former Smoker Last attempt to quit: 2010 Years since quittin.1  Smokeless tobacco: Never Used Substance and Sexual Activity  Alcohol use: Not Currently Comment: rarely  Drug use: Never Other Topics Concern LABORATORY RESULTS: 
  
Labs Latest Ref Rng & Units 2020 WBC 4.1 - 11.1 K/uL 3. 9(L) 3. 9(L) 4.5 3.4(L) 3. 9(L) 4.2 3.6(L)  
RBC 4.10 - 5.70 M/uL 3.37(L) 3.44(L) 3.50(L) 3.34(L) 3.30(L) 3.35(L) 3.42(L) Hemoglobin 12.1 - 17.0 g/dL 10. 1(L) 10. 1(L) 10. 3(L) 9.8(L) 9.7(L) 9.8(L) 10. 1(L) Hematocrit 36.6 - 50.3 % 33. 0(L) 33. 8(L) 34. 5(L) 33. 1(L) 32. 5(L) 33. 0(L) 32. 7(L) MCV 80.0 - 99.0 FL 97.9 98.3 98.6 99. 1(H) 98.5 98.5 95.6 Platelets 862 - 834 K/uL 129(L) 136(L) 144(L) 136(L) 124(L) 109(L) 89(L) Lymphocytes 12 - 49 % - - - - - - 8(L) Monocytes 5 - 13 % - - - - - - 9 Eosinophils 0 - 7 % - - - - - - 0 Basophils 0 - 1 % - - - - - - 1 Bands 0 - 6 % - - - - - - 0 Blasts 0 % - - - - - - 0 Albumin 3.5 - 5.0 g/dL 2. 8(L) 2. 7(L) 2. 8(L) 2. 6(L) 2. 3(L) 2. 2(L) 2. 2(L) Calcium 8.5 - 10.1 MG/DL 8.6 8.7 8.7 8.7 8.7 9.0 8.9 SGOT 15 - 37 U/L 12(L) 14(L) 15 15 14(L) 18 21 Glucose 65 - 100 mg/dL 99 102(H) 83 94 91 88 93 BUN 6 - 20 MG/DL 18 18 17 18 18 23(H) 25(H) Creatinine 0.70 - 1.30 MG/DL 1.04 0.99 1.05 1.03 0.94 1.06 1.05 Sodium 136 - 145 mmol/L 138 136 137 140 138 137 137 Potassium 3.5 - 5.1 mmol/L 4.3 4.0 4.6 4.3 4.4 4.9 4.4 TSH 0.36 - 3.74 uIU/mL - - - - - - -  
LDH 85 - 241 U/L - 175 - 174 170 204 245(H) CEA ng/mL - - - 5.4 - - - Some recent data might be hidden Lab Results Component Value Date/Time TSH 2.30 12/03/2019 03:29 AM  
 TSH 2.40 10/25/2019 07:39 PM  
 TSH 2.45 06/01/2019 04:16 AM  
 
 
ALLERGY: 
Allergies Allergen Reactions  Cefepime Other (comments)  
  myoclonus CURRENT MEDICATIONS: 
 
Current Facility-Administered Medications:  
  clonazePAM (KlonoPIN) tablet 0.25 mg, 0.25 mg, Oral, TID, Polliard, Ulysjana LOPEZ, NP, 0.25 mg at 01/29/20 1523   albuterol-ipratropium (DUO-NEB) 2.5 MG-0.5 MG/3 ML, 3 mL, Nebulization, TID RT, Vicente Herrera NP, Stopped at 01/29/20 1400   acetylcysteine (MUCOMYST) 200 mg/mL (20 %) solution 600 mg, 600 mg, Nebulization, TID RT, Vciente Herrera NP, Stopped at 01/29/20 1400 
  arformoteroL (BROVANA) neb solution 15 mcg, 15 mcg, Nebulization, BID RT, 15 mcg at 01/29/20 0043 **AND** budesonide (PULMICORT) 500 mcg/2 ml nebulizer suspension, 500 mcg, Nebulization, BID RT, Willow Rodrigues NP, 500 mcg at 01/29/20 6786   albumin human 25% (BUMINATE) solution 12.5 g, 12.5 g, IntraVENous, DAILY, Oksana Herrera NP, 12.5 g at 01/29/20 9357   epoetin yvonne-epbx (RETACRIT) injection 20,000 Units, 20,000 Units, SubCUTAneous, Q MON, WED & FRI, Bipin Herrera, NP 
  potassium chloride SR (KLOR-CON 10) tablet 10 mEq, 10 mEq, Oral, DAILY, Shana Sims B, NP, 10 mEq at 01/29/20 0625   sodium chloride (NS) flush 5-40 mL, 5-40 mL, IntraVENous, PRN, Marleen Gutierrez MD, 10 mL at 01/25/20 8147   naloxone [facility]) injection 0.4 mg, 0.4 mg, IntraVENous, PRN, Marleen Gutierrez MD 
  hydrALAZINE (APRESOLINE) 20 mg/mL injection 20 mg, 20 mg, IntraVENous, Q6H PRN, Shana Sims, NP, 20 mg at 01/29/20 0410   clonazePAM (KlonoPIN) tablet 0.25 mg, 0.25 mg, Oral, QHS PRN, Shana Sims, NP 
  meropenem (MERREM) 500 mg in 0.9% sodium chloride (MBP/ADV) 50 mL, 0.5 g, IntraVENous, Q6H, Juan C Olivares MD, Last Rate: 100 mL/hr at 01/29/20 1131, 500 mg at 01/29/20 1131   levETIRAcetam (KEPPRA) tablet 250 mg, 250 mg, Oral, BID, Javed Beck MD, 250 mg at 01/29/20 6940   senna-docusate (PERICOLACE) 8.6-50 mg per tablet 1 Tab, 1 Tab, Oral, PRN, Kendall Govea MD, 1 Tab at 01/18/20 4393   collagenase (SANTYL) 250 unit/gram ointment, , Topical, DAILY, Levi, Shana B, NP 
  fludrocortisone (FLORINEF) tablet 0.1 mg, 0.1 mg, Oral, DAILY, Shana Sims NP, 0.1 mg at 01/29/20 2082   cholecalciferol (VITAMIN D3) (1000 Units /25 mcg) tablet 2 Tab, 2,000 Units, Oral, DAILY, Bipin Herrera NP, 2 Tab at 01/29/20 9612   venlafaxine-SR (EFFEXOR-XR) capsule 150 mg, 150 mg, Oral, DAILY WITH BREAKFAST, Oksana Herrera NP, 150 mg at 01/29/20 7774   hydrocortisone (CORTAID) 1 % cream, , Topical, TID PRN, Mary Ellen Altman MD 
  thiamine HCL (B-1) tablet 100 mg, 100 mg, Oral, DAILY, Nallely Burris NP, 100 mg at 01/29/20 8469   oxybutynin (DITROPAN) tablet 5 mg, 5 mg, Oral, Q6H PRN, Shana Sims, NP, 5 mg at 01/01/20 1204   magnesium oxide (MAG-OX) tablet 800 mg, 800 mg, Oral, BID, Nayely Loyd NP, 800 mg at 01/29/20 4594   lidocaine (URO-JET/ GLYDO) 2 % jelly, , Urethral, PRN, Mounika Altman MD 
  albuterol-ipratropium (DUO-NEB) 2.5 MG-0.5 MG/3 ML, 3 mL, Nebulization, Q6H PRN, Nael Vega MD, 3 mL at 01/25/20 9114   therapeutic multivitamin (THERAGRAN) tablet 1 Tab, 1 Tab, Oral, DAILY, Shana Sims NP, 1 Tab at 01/29/20 4784   alteplase (CATHFLO) 1 mg in sterile water (preservative free) 1 mL injection, 1 mg, InterCATHeter, PRN, Mounika Shannon MD, 1 mg at 01/20/20 1156   finasteride (PROSCAR) tablet 5 mg, 5 mg, Oral, DAILY, David Tran MD, 5 mg at 01/29/20 9871   diphenhydrAMINE (BENADRYL) capsule 25 mg, 25 mg, Oral, QHS PRN, Camilo Herrera NP, 25 mg at 01/19/20 0044   octreotide (SANDOSTATIN) injection 25 mcg, 25 mcg, SubCUTAneous, TID, Elton Herrera NP, 25 mcg at 01/29/20 3501   pantoprazole (PROTONIX) tablet 40 mg, 40 mg, Oral, ACB, Elton Herrera NP, 40 mg at 01/29/20 0640 
  lactobac ac& pc-s.therm-b.anim (TIAN Q/RISAQUAD), 1 Cap, Oral, DAILY, Marianna Barrientos MD, 1 Cap at 01/29/20 7703   oxyCODONE IR (ROXICODONE) tablet 5 mg, 5 mg, Oral, Q6H PRN, Mamie Rojas MD, 5 mg at 12/30/19 0405   tamsulosin (FLOMAX) capsule 0.4 mg, 0.4 mg, Oral, DAILY, Logan Kincaid MD, 0.4 mg at 01/29/20 9668   Warfarin MD/NP dosing, , Other, PRN, Mounika Altman MD 
  sodium chloride (NS) flush 5-40 mL, 5-40 mL, IntraVENous, Q8H, Harshal Ward MD, 30 mL at 01/29/20 1320 
  sodium chloride (NS) flush 5-40 mL, 5-40 mL, IntraVENous, PRN, Mamie Rojas MD, 10 mL at 01/20/20 1326   acetaminophen (TYLENOL) tablet 650 mg, 650 mg, Oral, Q6H PRN, Mamie Rojas MD, 650 mg at 01/21/20 1512   naloxone (NARCAN) injection 0.4 mg, 0.4 mg, IntraVENous, PRN, Mitchel Mccord MD 
  ondansetron [facility]) injection 4 mg, 4 mg, IntraVENous, Q4H PRN, Mitchel Mccord MD, 4 mg at 01/24/20 0006 
  sucralfate (CARAFATE) tablet 1 g, 1 g, Oral, AC&HS, Mitchel Mccord MD, 1 g at 01/29/20 1125 
  influenza vaccine 2019-20 (6 mos+)(PF) (FLUARIX/FLULAVAL/FLUZONE QUAD) injection 0.5 mL, 0.5 mL, IntraMUSCular, PRIOR TO DISCHARGE, Asuncion Altman MD 
 
Thank you for allowing me to participate in this patient's care. Lorraine Beach, AGACNP-BC [facility] [address] [place] [address], [address] Phone: [phone] Fax: [phone] [facility] ATTENDING ADDENDUM Patient was seen and examined in person. Data and notes were reviewed. I have discussed and agree with the plan as noted in the NP note above without further additions. Melody Walters MD PhD 
[facility] [address] [place] [address]

## 2020-01-29 NOTE — PROGRESS NOTES
ID Progress Note 2020 Subjective: He states that he is feeling Kimberly Huff is getting some relief with the Klonopin Objective: Antibiotics: 1. Levaquin 2. Cefepime 3. Rifampin 4. Fluconazole 5. Vancomycin 6. Cefazolin 7. Zosyn 8. Fredia Norse Vitals:  
Visit Vitals BP 91/72 (BP 1 Location: Left arm, BP Patient Position: At rest) Pulse (!) 111 Temp 97.6 °F (36.4 °C) Resp 16 Ht 6' 2\" (1.88 m) Wt 83.7 kg (184 lb 8.4 oz) SpO2 99% BMI 23.69 kg/m² Tmax:  Temp (24hrs), Av.9 °F (36.6 °C), Min:97.4 °F (36.3 °C), Max:98.5 °F (36.9 °C) Exam:  Lungs: A few basilar rales Heart:  regular rate and rhythm Abdomen:  soft, non-tender. Bowel sounds normal. No masses,  no organomegaly Urine in neal is grossly clear Sternal wound is dressed and dry Labs:     
Recent Labs  
  20 
0108 20 
0435 20 
1018 WBC 3.9* 3.9* 4.5 HGB 10.1* 10.1* 10.3* * 136* 144* BUN 18 18 17 CREA 1.04 0.99 1.05  
SGOT 12* 14* 15  
 106 114 TBILI 0.6 0.7 0.9 Cultures: No results found for: [facility] Lab Results Component Value Date/Time Culture result: LIGHT YEAST (A) 2020 10:01 AM  
 Culture result: LIGHT NORMAL RESPIRATORY TIAN 2020 10:01 AM  
 Culture result: NO GROWTH 5 DAYS 2020 09:52 AM  
 
 
Radiology:  
 
Line/Insert Date:        
 
Assessment:  
 
1. UTI--Serratia (2 biotypes) from culture and small amount of Enterococcus--now with Pseudomonas from culture of urine and blood 2. CHF/cardiomyopathy 3. ?aspiration event(s) 4. Renal insufficiency 5. Respiratory difficulties Objective: 1. Adjust antibiotic therapy 2. Presence of LVAD in face of bacteremia may require longer course of therapy, or at least PO Rx for a timehe should stop IV therapy the middle of February 3. Work-up any fever David Mata MD

## 2020-01-29 NOTE — PROGRESS NOTES
Problem: Self Care Deficits Care Plan (Adult) Goal: *Acute Goals and Plan of Care (Insert Text) Description FUNCTIONAL STATUS PRIOR TO ADMISSION: Patient was modified independent using a single point cane for functional mobility. Patient able to shower and dress himself. However, patient required assistance for household management from his wife. HOME SUPPORT: The patient lived alone with wife to provide assistance. Continue all goals 1/24/2020 Occupational Therapy Goals all updated/continued as follows 1/17/2020 1. Patient will perform upper body dressing including management of LVAD with supervision/setup within 7 day(s) 2. Patient will perform lower body dressing with RW CGA within 7 day(s). -continue goal (MOD A simulated 1/17) 3. Patient will perform grooming standing with RW 2 mins with supervision/setup within 7 day(s). -continue goal (CGA 1/17) 4. Patient will perform toilet transfers with RW supervision/setup using LRAD within 7 day(s). 5.  Patient will perform all aspects of toileting with RW with minimal assistance within 7 day(s). 6.  Patient will perform gathering ADL items high and waist height 2/2 with RW supervision within 7 days. -continue goal (CGA 1/17) Occupational Therapy Goals all updated 1/10/2020 1. Patient will perform upper body dressing including management of LVAD with min A within 7 day(s) 2. Patient will perform lower body dressing with RW CGA within 7 day(s). 3.  Patient will perform grooming standing with RW 2 mins with supervision/setup within 7 day(s). 4.  Patient will perform toilet transfers with RW minimum assistance within 7 day(s). 5.  Patient will perform all aspects of toileting with RW with moderate assistance within 7 day(s). 6.  Patient will perform gathering ADL items high and waist height 2/2 with RW supervision within 7 days. Continue all goals 10/30/19 post re-eval for Impella removal  
Initiated 10/28/2019 1.  Patient will perform bathing with supervision/set-up within 7 day(s). 2.  Patient will perform lower body dressing with supervision/set-up within 7 day(s). 3.  Patient will perform grooming with modified independence within 7 day(s). 4.  Patient will perform toilet transfers with modified independence within 7 day(s). 5.  Patient will perform all aspects of toileting with supervision/set-up within 7 day(s). 6.  Patient will participate in upper extremity therapeutic exercise/activities with supervision/set-up for 5 minutes within 7 day(s). 7.  Patient will utilize energy conservation techniques during functional activities with verbal cues within 7 day(s). Outcome: Progressing Towards Goal 
 OCCUPATIONAL THERAPY TREATMENT Patient: Aaron Martinez (75 y.o. male) Date: 1/29/2020 Diagnosis: Acute decompensated heart failure ([facility] [address] [I50.9] <principal problem not specified> Procedure(s) (LRB): 
RIGHT HEART CATH (N/A) 8 Days Post-Op Precautions: Fall Chart, occupational therapy assessment, plan of care, and goals were reviewed. ASSESSMENT Patient continues with skilled OT services and is progressing towards goals. Patient continues to wax and wane with functional progression in therapy 2* medical complications however BP was stable today and patient completed full ADL routine with largely MOD A (see details in objective section-patient inconsistent with ADLs). Patient continues to present with generalized weakness, decreased endurance, and decreased activity tolerance with continued verbal cues required for management of LVAD equipment and safety with transfer techniques. Patient continues to be highly motivated to regain functional independence. Continue to recommend IPR.   
 
Patient is verbalizing and is not demonstrating understanding of mindful-based movements (\"move in the tube\") principles of keeping UEs proximal to ribcage to prevent lateral pull on the sternum during load-bearing activities with visual, verbal, and tactile cues required for compliance. Current Level of Function Impacting Discharge (ADLs): MOD A UB dressing, MIN A-MOD A LB dressing; MAX A toilet transfers; MOD A toileting tasks Other factors to consider for discharge: LVAD HM III 
    
 
PLAN : 
Patient continues to benefit from skilled intervention to address the above impairments. Continue treatment per established plan of care. to address goals. Recommend with staff: OOB x 3 to chair; BUE cardiac exercises Recommend next OT session: item retrieval for LVAD switchover Recommendation for discharge: (in order for the patient to meet his/her long term goals) Therapy 3 hours per day 5-7 days per week This discharge recommendation: 
Has been made in collaboration with the attending provider and/or case management IF patient discharges home will need the following DME: TBD SUBJECTIVE:  
Patient stated [place] is this? (regarding ; waxes and wanes with recall).  OBJECTIVE DATA SUMMARY:  
Cognitive/Behavioral Status: 
Neurologic State: Alert Orientation Level: Oriented X4 Cognition: Appropriate for age attention/concentration Perception: Appears intact Perseveration: No perseveration noted Safety/Judgement: Decreased insight into deficits; Decreased awareness of need for safety Functional Mobility and Transfers for ADLs: 
Bed Mobility: 
Supine to Sit: Contact guard assistance Transfers: 
Sit to Stand: Moderate assistance Functional Transfers Toilet Transfer : Maximum assistance(using walker) Balance: 
Sitting: Impaired; Without support Sitting - Static: Good (unsupported) Sitting - Dynamic: Fair (occasional) Standing: Impaired; With support Standing - Static: Fair;Constant support Standing - Dynamic : Fair;Constant support ADL Intervention: 
  
Patient completed functional mobility to the bathroom x MIN A (following sit <> stand transfer from bed x MOD A) using walker. Patient then continent of bowel on commode and completed pericare seated, requiring A for pulling pants up/over hips standing with walker (patient with condom catheter at this time). Patient then MIN A to [place] sweat pants with A for pulling pants up/over hips. Patient donning shirt x MIN A with cues for sequencing through task and A for LVAD switchover as follows: 
 
Patient demonstrated switchover from power module to battery in prep for ADL routine with MOD A. Demonstrated the following tasks:  
 Independent Verbal cues Physical assistance Comments Check PM & SC  x Ensure  clipped onto stabilization belt   x Remove front (green lighted) batteries from , place back batteries into front slots  x Check batteries  x Position batteries into battery clips x Don holster vest   x Disconnect power leads   x Insert power lead into battery clip x [place] batteries in holster  x Secure batteries with Velcro and clips   x Upper Body Dressing Assistance Dressing Assistance: Moderate assistance Lower Body Dressing Assistance Pants With Elastic Waist: Minimum assistance(A for pants up/over hips) Toileting Toileting Assistance: Moderate assistance(pericare seated on toilet leaning laterally) Bladder Hygiene: Total assistance (dependent)(neal catheter) Cognitive Retraining Safety/Judgement: Decreased insight into deficits; Decreased awareness of need for safety Patient instructed and educated on mindful movement principles based on Move in The Tube concept to include maintaining bilateral elbows close to rib cage when performing any load-bearing activity such as getting in/out of bed, pushing up from a chair, opening a door, or lifting a box. Patient was given a handout with diagrams of each correct/incorrect method of performing each of the above tasks. Patient instructed and encouraged to mobilize bilateral upper extremities outside the tube when doing any non-load bearing activity such as washing hair/body, brushing teeth, retrieving clothing items, or scratching your back. Patient instructed no asymmetrical reaching over head to ensure B UEs when shoulders >90* i.e. reaching in cabinets and dressing. Instruction on upper body dressing techniques of over head, then arms through to decrease pain and unilateral shoulder flexion >90*. Instruction on the benefits of utilizing B UEs during functional tasks i.e. opening the fridge, stepping into the tub. Activity Tolerance:  
Fair and requires rest breaks Please refer to the flowsheet for vital signs taken during this treatment. After treatment patient left in no apparent distress:  
Sitting in chair, Call bell within reach, and Caregiver / family present COMMUNICATION/COLLABORATION:  
The patients plan of care was discussed with: Physical Therapist and Registered Nurse Monique Dietz Time Calculation: 58 mins

## 2020-01-29 NOTE — PROGRESS NOTES
Problem: Mobility Impaired (Adult and Pediatric) Goal: *Acute Goals and Plan of Care (Insert Text) Description FUNCTIONAL STATUS PRIOR TO ADMISSION: Patient was modified independent using a single point cane for functional mobility. Patient reports an increasingly sedentary lifestyle 2* fatigue and SOB/dyspnea. Retired (so is his wife). Patient reports x 3 falls within the last couple of weeks. Patient is wearing either nasal cannula or CPAP at night. LVAD work-up has been initiated. HOME SUPPORT PRIOR TO ADMISSION: The patient lived with his wife, but did not require physical assistance. Physical Therapy Goals: 
 
Reassessment completed 1/23/2020 and goals upgraded as appropriate. 1.  Patient will move from supine to sit and sit to supine, scoot up and down and roll side to side in bed with supervision within 7 days. 2.  Patient will perform sit to/from stand with contact guard assistance x 1 within 7 days. 3.  Patient will ambulate 50 feet, twice, with least restrictive assistive device and moderate assistance within 7 days. 4.  Stair goal to be formulated when appropriate. 5.  Patient will perform cardiac exercises per protocol with independence and verbal cuing only for performance remembrance BID within 7 days. 6.  Patient will verbally and functionally recall mindful movement principles (Move in the Tube) with minimal verbal cuing/reminding within 7 days. 7.  Patient will perform power exchange for power module to/from battery with verbal cuing within 7 days. Reassessed 1/17/2020 and goals upgraded as approporiate Goals upgraded as appropriate 1/07/2020- Goals appropriate 1/9/2020 on weekly reassessment 1. Patient will move from supine to sit and sit to supine, scoot up and down and roll side to side in bed with supervision within 7 days. 2.  Patient will perform sit to/from stand with minimal assistance x 1 within 7 days. - Upgrade to contact guard assist 1/17/2020 3.  Patient will ambulate 50 feet with least restrictive assistive device and moderate assistance within 7 days. 4.  Stair goal to be formulated when appropriate. 5.  Patient will perform cardiac exercises per protocol with supervision within 7 days. 6.  Patient will verbally and functionally recall mindful movement principles (Move in the Tube) with minimal verbal cuing/reminding within 7 days. 7.  Patient will perform power exchange for power module to/from battery with minimal assistance within 7 days. - Upgrade to verbal cuing only 1/17/2020 Reassessed on 1/2/2020 and goals not met, carry-over. Reassessed on 12/26/2019, goals not met but remain appropriate, so carry over Weekly reassessment completed 12/19/2019 and goals downgraded as appropriate. 1.  Patient will move from supine to sit and sit to supine, scoot up and down and roll side to side in bed with contact guard assistance within 7 days. 2.  Patient will perform sit to/from stand with minimal assistance x 1 within 7 days. 3.  Patient will ambulate 25 feet with least restrictive assistive device and moderate assistance within 7 days. 4.  Stair goal to be formulated when appropriate. 5.  Patient will perform cardiac exercises per protocol with supervision within 7 days. 6.  Patient will verbally and functionally recall mindful movement principles (Move in the Tube) with minimal verbal cuing/reminding within 7 days. 7.  Patient will perform power exchange for power module to/from battery with moderate assistance within 7 days. Re-evaluation completed 11/20/2019 and new goals formulated following LVAD implantation. Reviewed and cont 12/3/2019. Reviewed and continued 12/12/2019 1. Patient will move from supine to sit and sit to supine, scoot up and down and roll side to side in bed with minimal assistance/contact guard assist within 7 days.    
2.  Patient will perform sit to/from stand with minimal assistance/contact guard assist within 7 days. 3.  Patient will ambulate 150 feet with least restrictive assistive device and minimal assistance/contact guard assist within 7 days. 4.  Patient will ascend/descend 4 stairs with handrail(s) with minimal assistance/contact guard assist within 7 days. 5.  Patient will perform cardiac exercises per protocol with supervision within 7 days. 6.  Patient will verbally and functionally recall 3/3 sternal precautions within 7 days. 7.  Patient will perform power exchange for power module to/from battery with moderate assistance  within 7 days. Initiated 10/27/2019; updated 11/15/2019 1. Patient will move from supine to sit and sit to supine, scoot up and down and roll side to side in bed with independence within 7 days. 2.  Patient will perform sit to/from stand with supervision/set-up within 7 days. 3.  Patient will ambulate 200 feet with least restrictive assistive device and supervision/set-up within 7 days. 4.  Patient will ascend/descend 4 stairs with  handrail(s) with supervision/set-up within 7 days for functional strengthening and community reintegration. Elza Cornelius 5.  Patient will verbally and functionally recall 3/3 sternal precautions within 7 days in preparation for LVAD implantation. 6.  Patient will perform a mock power exchange for power module to/from battery with supervision/set-up within 7 days in preparation for LVAD implantation. 1.  Patient will move from supine to sit and sit to supine, scoot up and down and roll side to side in bed with independence within 7 days. 2.  Patient will perform sit to/from stand with supervision/set-up within 7 days. 3.  Patient will ambulate 150 feet with least restrictive assistive device and supervision/set-up within 7 days. 4.  Patient will ascend/descend 4 stairs with  handrail(s) with supervision/set-up within 7 days for functional strengthening and community reintegration. Elza Cornelius 5.  Patient will verbally and functionally recall 3/3 sternal precautions within 7 days in preparation for LVAD implantation. 6.  Patient will perform a mock power exchange for power module to/from battery with supervision/set-up within 7 days in preparation for LVAD implantation. Outcome: Progressing Towards Goal 
 
PHYSICAL THERAPY TREATMENT Patient: Otis Dyson (75 y.o. male) Date: 1/29/2020 Diagnosis: Acute decompensated heart failure ([facility] [address] [I50.9] <principal problem not specified> Procedure(s) (LRB): 
RIGHT HEART CATH (N/A) 8 Days Post-Op Precautions: Fall Chart, physical therapy assessment, plan of care and goals were reviewed. ASSESSMENT Patient continues with skilled PT services and is progressing towards goals. Pt reports not feeling well but agreeable to ambulate. Pt apologetic throughout session reporting not doing well. Pt displayed improved gait quality with foot clearance and decrease anterior trunk with v.c. Pt did need frequent breaks but agreeable to continue (7 seated rest break in 150feet). Noted decrease tremors with gait. Pt had difficulties locating batteries and  in vest to doff. Current Level of Function Impacting Discharge (mobility/balance):mod A with chair follow Other factors to consider for discharge: waiting on rehab bed PLAN : 
Patient continues to benefit from skilled intervention to address the above impairments. Continue treatment per established plan of care. to address goals. Recommendation for discharge: (in order for the patient to meet his/her long term goals) Therapy 3 hours per day 5-7 days per week This discharge recommendation: 
Has not yet been discussed the attending provider and/or case management IF patient discharges home will need the following DME: to be determined (TBD) SUBJECTIVE:  
Patient stated Sorry I am not doing well.  OBJECTIVE DATA SUMMARY:  
 
 
Critical Behavior: Neurologic State: Alert Orientation Level: Oriented X4 Cognition: Appropriate for age attention/concentration Safety/Judgement: Decreased insight into deficits, Decreased awareness of need for safety Functional Mobility Training: 
Bed Mobility: 
  
Supine to Sit: Contact guard assistance Sit to Supine: Contact guard assistance Transfers: 
Sit to Stand: Moderate assistance Stand to Sit: Moderate assistance Balance: 
Sitting: Impaired; Without support Sitting - Static: Good (unsupported) Sitting - Dynamic: Fair (occasional) Standing: Impaired; With support Standing - Static: Fair;Constant support Standing - Dynamic : Fair;Constant support Ambulation/Gait Training: 
Distance (ft): 150 Feet (ft)(7 seated rest breaks) Assistive Device: Gait belt;Walker, rolling(chair follow) Ambulation - Level of Assistance: Moderate assistance(+ chair follow) Gait Abnormalities: Ataxic;Decreased step clearance;Trunk sway increased Base of Support: Center of gravity altered Stairs: VAD power transition Patient performed switchover from power module to battery. Completed the following tasks: 
 Independent Verbal cues Physical assist Comments Don holster vest      
Check batteries Check  Ensure  clipped onto stabilization belt Position batteries in clips Disconnect power leads Insert power lead into battery [place] batteries in [employer] Secure batteries with velcro and clips Patient performed switchover from battery to power module. Completed the following tasks: 
 Independent Verbal cues Physical assistance Comments Remove batteries from holster  x Disconnect power leads from battery x Reconnect power leads into power module x Remove batteries from clips Doff holster vest   x Therapeutic Exercises:  
 
 
Pain Rating: No complaints Activity Tolerance:  
Limited Please refer to the flowsheet for vital signs taken during this treatment. After treatment patient left in no apparent distress:  
Supine in bed, Call bell within reach, and Caregiver / family present COMMUNICATION/COLLABORATION:  
The patients plan of care was discussed with: Registered Nurse Almaz Cuba PTA Time Calculation: 44 mins

## 2020-01-29 NOTE — PROGRESS NOTES
[facility] [place] [facility] [address], [place] [address] [facility] , [place] Marya Hernandez W: [phone]  F: [phone] Reason for Consult:  
Cornel Silverio is a 71 y.o. male who is seen in consultation at the request of Dr. Na Colón for evaluation of abnormal PET scan. Treatment History: · EGD 11/11/19 normal 
· Colonoscopy 11/11/19 with moderate diverticulosis in the descending and sigmoid colon but was otherwise normal 
· Epoetin 20,000 units MWF History of Present Illness: The patient is a very pleasant [de-identified] y.o. male who was first seen by Dr. Rocio Mckeon at the end of October for thrombocytopenia, iron deficiency anemia, and mediastinal lymphadenopathy. The plan was for a PET scan after discharge as well as a colonoscopy once patient is well enough and IV iron. Patient had an EGD and colonoscopy 11/11/19. EGD was normal. Colonoscopy showed moderate diverticulosis in the descending and sigmoid colon but was otherwise normal. Patient previously had gross hematuria which was believed to be the cause of his blood loss anemia. Patient remains in the hospital and now has neutropenia, so hematology was re-consulted. SinceI  last saw patient, patient completed his LVAD evaluation and had an implant placed on 11/18/19. He is hemodynamically stable and remains in the hospital for PT/OT. Patient reports he is doing fair. He remains weak. He has been working with PT/OT but activity is limited due to orthostatic hypotension. He denies CP or SOB. He denies hemoptysis, hematemesis, melena, hematuria or epistaxis. 1/16/2020 The patient's blood work looks good. There does not appear to be a problem with iron B12 or folate. Copper level is still pending. Patient is on a number of drugs that could affect the white count. It would be important to eliminate as many of those that we can safely. 1/17/2020 Patient sitting up in bed eating breakfast. Patient labs stable today. Copper level still pending 2020 Patient seems to be doing fairly well his white count has come up. Would cut back on the frequency of the CBCs. And we will sign back on the case if any issues or problems come up. 
 
2020 Patient appears in good spirits and states he should be getting discharged in the next day or two to inpatient rehab. He states he has been feeling well and trying to improve his strength. PET scan does not show any active lymph nodes to suggest cancer. The lymphadenopathy that he has appears to be reactive. He does have increased activity in his lungs also consistent with an inflammatory process. Past Medical History:  
Diagnosis Date  Degenerative disc disease, lumbar  Heart failure ([facility] [address]  High cholesterol  Hypertension  Paroxysmal atrial fibrillation ([facility] [address] 2019  Spinal stenosis Past Surgical History:  
Procedure Laterality Date  COLONOSCOPY Left 2019 COLONOSCOPY at bedside performed by Debbie Prjaapati MD at [address] [facility]  HX CORONARY ARTERY BYPASS GRAFT    
 triple  HX HERNIA REPAIR    
 HX IMPLANTABLE CARDIOVERTER DEFIBRILLATOR  IA CARDIOVERSION ELECTIVE ARRHYTHMIA EXTERNAL N/A 6/10/2019 EP CARDIOVERSION performed by Sonia Goss MD at [address] CATH LAB  IA CARDIOVERSION ELECTIVE ARRHYTHMIA EXTERNAL N/A 2019 EP CARDIOVERSION performed by Kamilah Downey MD at [address] CATH LAB  IA INSJ/RPLCMT PERM DFB W/TRNSVNS LDS 1/DUAL CHMBR N/A 2019 INSERT ICD BIV MULTI performed by Bailee Joyner MD at [address] CATH LAB  IA TCAT IMPL WRLS P-ART PRS SNR L-T HEMODYN MNTR N/A 2019 IMPLANT HEART FAILURE MONITORING DEVICE performed by Rupa Cid MD at [address] CATH LAB Social History Tobacco Use  Smoking status: Former Smoker Last attempt to quit: 2010 Years since quittin.1  Smokeless tobacco: Never Used Substance Use Topics  Alcohol use: Not Currently Comment: rarely Family History Problem Relation Age of Onset  Lung Disease Mother  Hypertension Mother Juan C Johns Arthritis-osteo Mother  Heart Disease Mother  Heart Disease Father  Diabetes Father Current Facility-Administered Medications Medication Dose Route Frequency  clonazePAM (KlonoPIN) tablet 0.25 mg  0.25 mg Oral TID  albuterol-ipratropium (DUO-NEB) 2.5 MG-0.5 MG/3 ML  3 mL Nebulization TID RT  
 acetylcysteine (MUCOMYST) 200 mg/mL (20 %) solution 600 mg  600 mg Nebulization TID RT  
 arformoteroL (BROVANA) neb solution 15 mcg  15 mcg Nebulization BID RT And  
 budesonide (PULMICORT) 500 mcg/2 ml nebulizer suspension  500 mcg Nebulization BID RT  
 albumin human 25% (BUMINATE) solution 12.5 g  12.5 g IntraVENous DAILY  epoetin yvonne-epbx (RETACRIT) injection 20,000 Units  20,000 Units SubCUTAneous Q MON, WED & FRI  potassium chloride SR (KLOR-CON 10) tablet 10 mEq  10 mEq Oral DAILY  sodium chloride (NS) flush 5-40 mL  5-40 mL IntraVENous PRN  
 naloxone (NARCAN) injection 0.4 mg  0.4 mg IntraVENous PRN  
 hydrALAZINE (APRESOLINE) 20 mg/mL injection 20 mg  20 mg IntraVENous Q6H PRN  
 clonazePAM (KlonoPIN) tablet 0.25 mg  0.25 mg Oral QHS PRN  
 meropenem (MERREM) 500 mg in 0.9% sodium chloride (MBP/ADV) 50 mL  0.5 g IntraVENous Q6H  
 levETIRAcetam (KEPPRA) tablet 250 mg  250 mg Oral BID  senna-docusate (PERICOLACE) 8.6-50 mg per tablet 1 Tab  1 Tab Oral PRN  
 collagenase (SANTYL) 250 unit/gram ointment   Topical DAILY  fludrocortisone (FLORINEF) tablet 0.1 mg  0.1 mg Oral DAILY  cholecalciferol (VITAMIN D3) (1000 Units /25 mcg) tablet 2 Tab  2,000 Units Oral DAILY  venlafaxine-SR (EFFEXOR-XR) capsule 150 mg  150 mg Oral DAILY WITH BREAKFAST  hydrocortisone (CORTAID) 1 % cream   Topical TID PRN  thiamine HCL (B-1) tablet 100 mg  100 mg Oral DAILY  oxybutynin (DITROPAN) tablet 5 mg  5 mg Oral Q6H PRN  
 magnesium oxide (MAG-OX) tablet 800 mg  800 mg Oral BID  lidocaine (URO-JET/ GLYDO) 2 % jelly   Urethral PRN  
 albuterol-ipratropium (DUO-NEB) 2.5 MG-0.5 MG/3 ML  3 mL Nebulization Q6H PRN  therapeutic multivitamin (THERAGRAN) tablet 1 Tab  1 Tab Oral DAILY  alteplase (CATHFLO) 1 mg in sterile water (preservative free) 1 mL injection  1 mg InterCATHeter PRN  finasteride (PROSCAR) tablet 5 mg  5 mg Oral DAILY  diphenhydrAMINE (BENADRYL) capsule 25 mg  25 mg Oral QHS PRN  
 octreotide (SANDOSTATIN) injection 25 mcg  25 mcg SubCUTAneous TID  pantoprazole (PROTONIX) tablet 40 mg  40 mg Oral ACB  lactobac ac& pc-s.therm-b.anim (TIAN Q/RISAQUAD)  1 Cap Oral DAILY  oxyCODONE IR (ROXICODONE) tablet 5 mg  5 mg Oral Q6H PRN  
 tamsulosin (FLOMAX) capsule 0.4 mg  0.4 mg Oral DAILY  Warfarin MD/NP dosing   Other PRN  
 sodium chloride (NS) flush 5-40 mL  5-40 mL IntraVENous Q8H  
 sodium chloride (NS) flush 5-40 mL  5-40 mL IntraVENous PRN  
 acetaminophen (TYLENOL) tablet 650 mg  650 mg Oral Q6H PRN  
 naloxone (NARCAN) injection 0.4 mg  0.4 mg IntraVENous PRN  
 ondansetron (ZOFRAN) injection 4 mg  4 mg IntraVENous Q4H PRN  
 sucralfate (CARAFATE) tablet 1 g  1 g Oral AC&HS  influenza vaccine 2019-20 (6 mos+)(PF) (FLUARIX/FLULAVAL/FLUZONE QUAD) injection 0.5 mL  0.5 mL IntraMUSCular PRIOR TO DISCHARGE Allergies Allergen Reactions  Cefepime Other (comments)  
  myoclonus Review of Systems: A complete review of systems was obtained, negative except as described above. Physical Exam:  
 
Visit Vitals BP 91/72 (BP 1 Location: Left arm, BP Patient Position: At rest) Pulse (!) 111 Temp 97.6 °F (36.4 °C) Resp 16 Ht 6' 2\" (1.88 m) Wt 184 lb 8.4 oz (83.7 kg) SpO2 99% BMI 23.69 kg/m² ECOG PS: 2 General: No distress Eyes: PERRL, anicteric sclerae HENT: Atraumatic, OP clear Neck: Supple Respiratory: normal respiratory effort, on O2 
MS: In bed, moves all extremities Skin: Warm and dry Psych: Alert, oriented, appropriate affect, normal judgment/insight Results:  
 
Lab Results Component Value Date/Time WBC 3.9 (L) 01/29/2020 01:08 AM  
 HGB 10.1 (L) 01/29/2020 01:08 AM  
 HCT 33.0 (L) 01/29/2020 01:08 AM  
 PLATELET 276 (L) 20/65/0303 01:08 AM  
 MCV 97.9 01/29/2020 01:08 AM  
 ABS. NEUTROPHILS 3.0 01/20/2020 06:11 AM  
 
Lab Results Component Value Date/Time Sodium 138 01/29/2020 01:08 AM  
 Potassium 4.3 01/29/2020 01:08 AM  
 Chloride 106 01/29/2020 01:08 AM  
 CO2 29 01/29/2020 01:08 AM  
 Glucose 99 01/29/2020 01:08 AM  
 BUN 18 01/29/2020 01:08 AM  
 Creatinine 1.04 01/29/2020 01:08 AM  
 GFR est AA >60 01/29/2020 01:08 AM  
 GFR est non-AA >60 01/29/2020 01:08 AM  
 Calcium 8.6 01/29/2020 01:08 AM  
 Glucose (POC) 99 01/27/2020 11:11 AM  
 
Lab Results Component Value Date/Time Bilirubin, total 0.6 01/29/2020 01:08 AM  
 ALT (SGPT) 12 01/29/2020 01:08 AM  
 AST (SGOT) 12 (L) 01/29/2020 01:08 AM  
 Alk. phosphatase 105 01/29/2020 01:08 AM  
 Protein, total 6.2 (L) 01/29/2020 01:08 AM  
 Albumin 2.8 (L) 01/29/2020 01:08 AM  
 Globulin 3.4 01/29/2020 01:08 AM  
 
CT C/A/P 10/25/19 IMPRESSION:  
1. Low-grade nonspecific mediastinal adenopathy. 2. Trace right pleural effusion. 3. Emphysema. 4. Left renal atrophy. 5. Colonic diverticula. 6. Moderate bladder distention with diverticula CTA CHEST 1/14/20 IMPRESSION:  
1. No occlusion or stenosis of the LVAD outflow tubing. 2. Nonocclusive radiodense foreign body in the posterior basilar left lower 
lobar artery. 3. Decreased pulmonary edema. Bilateral lower lobe atelectasis more likely than 
pneumonia. 4. Unchanged lymphadenopathy. Other incidental findings are described above. 1/15/20 Peripheral blood, smear:  
Pancytopenia without dysmyelopoiesis, dyserythropoiesis or platelet aggregation. PET/CT 1/27/2020 IMPRESSION: Bilateral pulmonary infiltrates are hypermetabolic and increased 
compared to the prior CT. Mediastinal lymph nodes are mildly hypermetabolic, 
which is a nonspecific finding. Records reviewed and summarized above. Radiology report(s) reviewed above. Assessment:  
1) Mediastinal lymphadenopathy Unchanged on CTA Chest 1/14/20 PET/CT on 1/27/2020 shows mediastinal lymph nodes are mildly hypermetabolic. This is most likely due to inflammation from pulmonary infiltrates. Pulmonary infiltrates appear stable and not concerning at this time. 2) Neutropenia Peripheral blood smear demonstrates pancytopenia without dysmyelopoiesis, dyserythropoiesis or platelet aggregation. Patient does not have any signs or symptoms of infection Vitamin B12, folate, and copper levels within normal limits May be secondary to nutritional deficiency; patient improving PO intake Improvement seen after stopping drugs that have been associated with neutropenia. WBC 3.9 today and appears stable. 3) Anemia with iron deficiency Stable; Today Hgb is 10.1 g/dL EGD and colonoscopy 11/11/19 did not show any obvious cause of bleeding It was believed that patient's iron deficiency anemia was secondary to gross hematuria. The iron studies were normal as was B12 and folate. Patient is receiving Epoetin MWF 
 
3) Thrombocytopenia Stable; currently 129 HIT panel checked when thrombocytopenia first started 10/31/19 and was negative May be secondary to nutritional deficiency; PO intake improving 5) CHF S/p LVAD 11/18/19 Patient on Coumadin for anticoagulation for LVAD with a INR goal 1.5-2 Management per cardiology and cardiac surgery 6) CAD s/p CABG Management per cardiology and cardiac surgery 7) CKD 3, atrophic left kidney Likely contributing to anemia Patient is on Epoetin MWF 
 
8) COPD Management per primary team 
 
9) Orthostatic hypotension Patient on midodrine and Florinef Management per cardiology 10) Malnutrition Likely contributing to pancytopenia RD is following for nutritional needs Plan:  
 
· Pancytopenia resolving. · Mediastinal lymphadenopathy is not concerning for malignancy. · We will go ahead and sign off the case and we will [place] back around if you have any new issues that come up. Call if questions I appreciate the opportunity to participate in Mr. Sona Kiser's care.

## 2020-01-29 NOTE — PROGRESS NOTES
Problem: Falls - Risk of 
Goal: *Absence of Falls Description Document Giuliana Reyes Fall Risk and appropriate interventions in the flowsheet. Outcome: Progressing Towards Goal 
Note: Fall Risk Interventions: 
Mobility Interventions: Communicate number of staff needed for ambulation/transfer, OT consult for ADLs, Patient to call before getting OOB, PT Consult for mobility concerns, Utilize gait belt for transfers/ambulation Mentation Interventions: Adequate sleep, hydration, pain control, Door open when patient unattended, Evaluate medications/consider consulting pharmacy, Gait belt with transfers/ambulation Medication Interventions: Evaluate medications/consider consulting pharmacy, Patient to call before getting OOB, Teach patient to arise slowly, Utilize gait belt for transfers/ambulation Elimination Interventions: Call light in reach, Patient to call for help with toileting needs History of Falls Interventions: Evaluate medications/consider consulting pharmacy, Room close to nurse's station, Door open when patient unattended Problem: Pain Goal: *Control of Pain Outcome: Progressing Towards Goal 
  
Problem: Pressure Injury - Risk of 
Goal: *Prevention of pressure injury Description Document Mich Scale and appropriate interventions in the flowsheet. Offload heels Turn approximately every 2 hours Outcome: Progressing Towards Goal 
Note: Pressure Injury Interventions: 
Sensory Interventions: Assess changes in LOC, Keep linens dry and wrinkle-free, Pressure redistribution bed/mattress (bed type) Moisture Interventions: Absorbent underpads, Check for incontinence Q2 hours and as needed Activity Interventions: Chair cushion, Increase time out of bed, Pressure redistribution bed/mattress(bed type), PT/OT evaluation Mobility Interventions: Pressure redistribution bed/mattress (bed type), PT/OT evaluation Nutrition Interventions: Document food/fluid/supplement intake, Offer support with meals,snacks and hydration Friction and Shear Interventions: Lift sheet, Minimize layers Problem: Heart Failure: Discharge Outcomes Goal: *Demonstrates ability to perform prescribed activity without shortness of breath or discomfort Outcome: Progressing Towards Goal 
Goal: *Left ventricular function assessment completed prior to or during stay, or planned for post-discharge Outcome: Progressing Towards Goal 
Goal: *Verbalizes understanding and describes prescribed diet Outcome: Progressing Towards Goal 
Goal: *Verbalizes understanding/describes prescribed medications Outcome: Progressing Towards Goal 
Goal: *Describes available resources and support systems Description 
(eg: Home Health, Palliative Care, Advanced Medical Directive) Outcome: Progressing Towards Goal 
Goal: *Understands and describes signs and symptoms to report to providers(Stroke Metric) Outcome: Progressing Towards Goal 
Goal: *Describes importance of continuing daily weights and changes to report to physician Outcome: Progressing Towards Goal

## 2020-01-29 NOTE — PROGRESS NOTES
[facility] VAD/Heart Failure Progress Note Admit Date: 10/25/2019 POD:  8 Days Post-Op Procedure:  Procedure(s): RIGHT HEART CATH Subjective:  
Mild dyspnea, fatigue, and weakness; working on ambulation Objective:  
Vitals: 
Blood pressure 91/72, pulse (!) 108, temperature 97.8 °F (36.6 °C), resp. rate 18, height 6' 2\" (1.88 m), weight 184 lb 8.4 oz (83.7 kg), SpO2 99 %. Temp (24hrs), Av.9 °F (36.6 °C), Min:97.4 °F (36.3 °C), Max:98.5 °F (36.9 °C) Hemodynamics: 
 CO: CO (l/min): 5.8 l/min CI: CI (l/min/m2): 2.8 l/min/m2 CVP: CVP (mmHg): 4 mmHg (19 1645) SVR: SVR (dyne*sec)/cm5: 1080 (dyne*sec)/cm5 (19 1200) PAP Systolic: PAP Systolic: 34 (96//64 3275) PAP Diastolic: PAP Diastolic: 13 (/ 9649) PVR:   
 SV02: SVO2 (%): 67 % (19 1300) SCV02: SCVO2 (%): 75 % (10/29/19 1900) VAD Interrogation: LVAD (Heartmate) Pump Speed (RPM): 5800 Pump Flow (LPM): 4.6 Chatter in Lines: No 
PI (Pulsitility Index): 5.2 Power: 4.2 MAP: 75  Test: Yes 
Back Up  at Bedside & Labeled: Yes Power Module Test: Yes Driveline Site Care: No 
Driveline Dressing: Clean, Dry, and Intact EKG/Rhythm:   
 
Extubation Date / Time:  
 
CT Output:  
 
Ventilator: 
Ventilator Volumes Vt Set (ml): 550 ml (19 08) Vt Exhaled (Machine Breath) (ml): 639 ml (19 0826) Vt Spont (ml): 437 ml (19 1035) Ve Observed (l/min): 7.25 l/min (19 1035) Oxygen Therapy: 
Oxygen Therapy O2 Sat (%): 99 % (20 1605) Pulse via Oximetry: 70 beats per minute (20 0845) O2 Device: Room air (20 1605) PEEP/CPAP (cm H2O): 1 cm H20 (20 1101) O2 Flow Rate (L/min): 2 l/min (20 1120) FIO2 (%): 21 % (20 0710) CXR: 
 
Admission Weight: Last Weight Weight: 192 lb 10.9 oz (87.4 kg) Weight: 184 lb 8.4 oz (83.7 kg) Intake / Output / Drain: 
Current Shift: 701 - 1900 In: 1440 [P.O.:1240; I.V.:200] Out: 775 [Urine:775] Last 24 hrs.:  
 
Intake/Output Summary (Last 24 hours) at 1/29/2020 1613 Last data filed at 1/29/2020 2512 Gross per 24 hour Intake 1890 ml Output 1225 ml Net 665 ml No results for input(s): CPK, CKMB, TROIQ in the last 72 hours. Recent Labs  
  01/29/20 
0108 01/28/20 
0435 01/27/20 
1018  136 137  
K 4.3 4.0 4.6 CO2 29 29 29 BUN 18 18 17 CREA 1.04 0.99 1.05  
GLU 99 102* 83 MG  --  2.0  --   
WBC 3.9* 3.9* 4.5 HGB 10.1* 10.1* 10.3* HCT 33.0* 33.8* 34.5*  
* 136* 144* Recent Labs  
  01/29/20 
0108 01/28/20 
0435 01/27/20 
1018 INR 1.5* 1.4* 1.4* PTP 14.9* 13.8* 14.0* No lab exists for component: PBNP Current Facility-Administered Medications:  
  warfarin (COUMADIN) tablet 4 mg, 4 mg, Oral, ONCE, Analisa Herrera NP 
  clonazePAM (KlonoPIN) tablet 0.25 mg, 0.25 mg, Oral, TID, Sharon, Antwan LOPEZ NP, 0.25 mg at 01/29/20 1523   albuterol-ipratropium (DUO-NEB) 2.5 MG-0.5 MG/3 ML, 3 mL, Nebulization, TID RT, Analisa Herrera NP, Stopped at 01/29/20 1400   acetylcysteine (MUCOMYST) 200 mg/mL (20 %) solution 600 mg, 600 mg, Nebulization, TID RT, Analisa Herrera NP, Stopped at 01/29/20 1400 
  arformoteroL (BROVANA) neb solution 15 mcg, 15 mcg, Nebulization, BID RT, 15 mcg at 01/29/20 0043 **AND** budesonide (PULMICORT) 500 mcg/2 ml nebulizer suspension, 500 mcg, Nebulization, BID RT, Analisa Herrera NP, 500 mcg at 01/29/20 5803   albumin human 25% (BUMINATE) solution 12.5 g, 12.5 g, IntraVENous, DAILY, Antwan Herrera NP, 12.5 g at 01/29/20 4267   epoetin yvonne-epbx (RETACRIT) injection 20,000 Units, 20,000 Units, SubCUTAneous, Q MON, WED & FRI, Analisa Herrera, NP 
  potassium chloride SR (KLOR-CON 10) tablet 10 mEq, 10 mEq, Oral, DAILY, Shana Sims NP, 10 mEq at 01/29/20 65   sodium chloride (NS) flush 5-40 mL, 5-40 mL, IntraVENous, PRN, Priya Nava, Wu Alvarado MD, 10 mL at 01/25/20 7435   naloxone [facility]) injection 0.4 mg, 0.4 mg, IntraVENous, PRN, Audelia Stone MD 
  hydrALAZINE (APRESOLINE) 20 mg/mL injection 20 mg, 20 mg, IntraVENous, Q6H PRN, Alexi Simsin B, NP, 20 mg at 01/29/20 0410   clonazePAM (KlonoPIN) tablet 0.25 mg, 0.25 mg, Oral, QHS PRN, Levi, Shana B, NP 
  meropenem (MERREM) 500 mg in 0.9% sodium chloride (MBP/ADV) 50 mL, 0.5 g, IntraVENous, Q6H, Juan C Olivares MD, Last Rate: 100 mL/hr at 01/29/20 1131, 500 mg at 01/29/20 1131   levETIRAcetam (KEPPRA) tablet 250 mg, 250 mg, Oral, BID, Javed Beck MD, 250 mg at 01/29/20 1450   senna-docusate (PERICOLACE) 8.6-50 mg per tablet 1 Tab, 1 Tab, Oral, PRN, Carolin Patterson MD, 1 Tab at 01/18/20 2661   collagenase (SANTYL) 250 unit/gram ointment, , Topical, DAILY, Alexi Simsin LIVIA, NP 
  fludrocortisone (FLORINEF) tablet 0.1 mg, 0.1 mg, Oral, DAILY, Shana Sims, NP, 0.1 mg at 01/29/20 2016   cholecalciferol (VITAMIN D3) (1000 Units /25 mcg) tablet 2 Tab, 2,000 Units, Oral, DAILY, Analisa Herrera NP, 2 Tab at 01/29/20 2229   venlafaxine-SR (EFFEXOR-XR) capsule 150 mg, 150 mg, Oral, DAILY WITH BREAKFAST, Antwan Herrera NP, 150 mg at 01/29/20 0130   hydrocortisone (CORTAID) 1 % cream, , Topical, TID PRN, Olaf Altman MD 
  thiamine HCL (B-1) tablet 100 mg, 100 mg, Oral, DAILY, Phyllis Amaya NP, 100 mg at 01/29/20 4370   oxybutynin (DITROPAN) tablet 5 mg, 5 mg, Oral, Q6H PRN, Shana Sims NP, 5 mg at 01/01/20 1204   magnesium oxide (MAG-OX) tablet 800 mg, 800 mg, Oral, BID, Phyllis Amaya NP, 800 mg at 01/29/20 0694   lidocaine (URO-JET/ GLYDO) 2 % jelly, , Urethral, PRN, Mounika Altman MD 
  albuterol-ipratropium (DUO-NEB) 2.5 MG-0.5 MG/3 ML, 3 mL, Nebulization, Q6H PRN, [place] MD, 3 mL at 01/25/20 1851   therapeutic multivitamin (THERAGRAN) tablet 1 Tab, 1 Tab, Oral, DAILY, Filipe Persaud NP, 1 Tab at 01/29/20 0082   alteplase (CATHFLO) 1 mg in sterile water (preservative free) 1 mL injection, 1 mg, InterCATHeter, PRN, Mounika Ambrocio MD, 1 mg at 01/20/20 1156   finasteride (PROSCAR) tablet 5 mg, 5 mg, Oral, DAILY, Shaan Kelly MD, 5 mg at 01/29/20 4409   diphenhydrAMINE (BENADRYL) capsule 25 mg, 25 mg, Oral, QHS PRN, Haley Herrera NP, 25 mg at 01/19/20 0044   octreotide (SANDOSTATIN) injection 25 mcg, 25 mcg, SubCUTAneous, TID, Polliard, Nicky Fuse, NP, 25 mcg at 01/29/20 3650   pantoprazole (PROTONIX) tablet 40 mg, 40 mg, Oral, ACB, Nicky Herrera NP, 40 mg at 01/29/20 0640 
  lactobac ac& pc-s.therm-b.anim (TIAN Q/RISAQUAD), 1 Cap, Oral, DAILY, Elier Brown MD, 1 Cap at 01/29/20 9747   oxyCODONE IR (ROXICODONE) tablet 5 mg, 5 mg, Oral, Q6H PRN, Kurt Harris MD, 5 mg at 12/30/19 0405   tamsulosin (FLOMAX) capsule 0.4 mg, 0.4 mg, Oral, DAILY, Logan Kincaid MD, 0.4 mg at 01/29/20 3567   Warfarin MD/NP dosing, , Other, PRN, Mounika Altman MD 
  sodium chloride (NS) flush 5-40 mL, 5-40 mL, IntraVENous, Q8H, Harshal Aleman MD, 30 mL at 01/29/20 1320 
  sodium chloride (NS) flush 5-40 mL, 5-40 mL, IntraVENous, PRN, Kurt Harris MD, 10 mL at 01/20/20 1326   acetaminophen (TYLENOL) tablet 650 mg, 650 mg, Oral, Q6H PRN, Kurt Harris MD, 650 mg at 01/21/20 1512 
  naloxone [facility]) injection 0.4 mg, 0.4 mg, IntraVENous, PRN, Kurt Harris MD 
  ondansetron [facility]) injection 4 mg, 4 mg, IntraVENous, Q4H PRN, Kurt Harris MD, 4 mg at 01/24/20 3256 
  sucralfate (CARAFATE) tablet 1 g, 1 g, Oral, AC&HS, Kurt Harris MD, 1 g at 01/29/20 1125 
  influenza vaccine 2019-20 (6 mos+)(PF) (FLUARIX/FLULAVAL/FLUZONE QUAD) injection 0.5 mL, 0.5 mL, IntraMUSCular, PRIOR TO DISCHARGE, Mounika Altman MD 
 
A/P 
  
S/P LVAD - good flows Need for anti-coagulation - coumadin DM - insulin RV dysfunction - milrinone, diuretics  
  
Risk of morbidity and mortality - high Medical decision making - high complexity Signed By: Rufus Mills MD

## 2020-01-29 NOTE — PROGRESS NOTES
0730:  Bedside shift change report given to Isabell Bajwa RN (oncoming nurse) by Ijeoma Tolbert RN (offgoing nurse). Report included the following information SBAR, Kardex, Procedure Summary, Intake/Output, MAR, and Recent Results. 0830:  Patient lethargic but arousable. 1050:  Orhtostatic blood pressure performed - see note. 1245:  Patient working with OT. 
 
1320:  Patient up in chair eating. 1357:  Patient ambulating with PT. 
 
1812:  Driveline dressing change performed under sterile technique. No drainage noted. 1930:  Bedside shift change report given to Ijeoma Tolbert RN (oncoming nurse) by Isabell Bajwa RN (offgoing nurse). Report included the following information SBAR, Kardex, Procedure Summary, Intake/Output, MAR and Recent Results. Problem: Falls - Risk of 
Goal: *Absence of Falls Description Document Edgar Brunner Fall Risk and appropriate interventions in the flowsheet. Outcome: Progressing Towards Goal 
Note: Fall Risk Interventions: 
Mobility Interventions: Patient to call before getting OOB Mentation Interventions: Adequate sleep, hydration, pain control, Door open when patient unattended, Evaluate medications/consider consulting pharmacy, Gait belt with transfers/ambulation Medication Interventions: Patient to call before getting OOB, Utilize gait belt for transfers/ambulation Elimination Interventions: Call light in reach, Patient to call for help with toileting needs History of Falls Interventions: Evaluate medications/consider consulting pharmacy, Room close to nurse's station, Door open when patient unattended Problem: Pain Goal: *Control of Pain Outcome: Progressing Towards Goal 
  
Problem: Pressure Injury - Risk of 
Goal: *Prevention of pressure injury Description Document Mich Scale and appropriate interventions in the flowsheet. Offload heels Turn approximately every 2 hours Outcome: Progressing Towards Goal 
Note: Pressure Injury Interventions: Sensory Interventions: Assess changes in LOC, Keep linens dry and wrinkle-free, Pressure redistribution bed/mattress (bed type) Moisture Interventions: Absorbent underpads, Check for incontinence Q2 hours and as needed Activity Interventions: Increase time out of bed, Pressure redistribution bed/mattress(bed type) Mobility Interventions: HOB 30 degrees or less, Pressure redistribution bed/mattress (bed type) Nutrition Interventions: Document food/fluid/supplement intake, Discuss nutritional consult with provider, Offer support with meals,snacks and hydration Friction and Shear Interventions: Lift sheet, Minimize layers Problem: Heart Failure: Discharge Outcomes Goal: *Demonstrates ability to perform prescribed activity without shortness of breath or discomfort Outcome: Progressing Towards Goal 
  
Problem: LVAD Post-op Day 15 to Discharge Goal: Nutrition/Diet Outcome: Progressing Towards Goal 
Goal: Medications Outcome: Progressing Towards Goal 
  
Problem: Impaired Skin Integrity/Pressure Injury Treatment Goal: *Improvement of Existing Pressure Injury Outcome: Progressing Towards Goal

## 2020-01-29 NOTE — PROGRESS NOTES
[facility] VAD/Heart Failure Progress Note Admit Date: 10/25/2019 POD:  7 Days Post-Op Procedure:  Procedure(s): RIGHT HEART CATH Subjective:  
Mild dyspnea, fatigue, and weakness; working on ambulation Objective:  
Vitals: 
Blood pressure 91/72, pulse (!) 112, temperature 97.6 °F (36.4 °C), resp. rate 18, height 6' 2\" (1.88 m), weight 184 lb 15.5 oz (83.9 kg), SpO2 96 %. Temp (24hrs), Av °F (36.7 °C), Min:97.6 °F (36.4 °C), Max:98.6 °F (37 °C) Hemodynamics: 
 CO: CO (l/min): 5.8 l/min CI: CI (l/min/m2): 2.8 l/min/m2 CVP: CVP (mmHg): 4 mmHg (19 1645) SVR: SVR (dyne*sec)/cm5: 1080 (dyne*sec)/cm5 (19 1200) PAP Systolic: PAP Systolic: 34 (15/57/63 2343) PAP Diastolic: PAP Diastolic: 13 (/50 7089) PVR:   
 SV02: SVO2 (%): 67 % (19 1300) SCV02: SCVO2 (%): 75 % (10/29/19 1900) VAD Interrogation: LVAD (Heartmate) Pump Speed (RPM): 5800 Pump Flow (LPM): 5.3 Chatter in Lines: No 
PI (Pulsitility Index): 2.3 Power: 4.2 MAP: 75  Test: No 
Back Up  at Bedside & Labeled: No 
Power Module Test: No 
Driveline Site Care: No 
Driveline Dressing: Clean, Dry, and Intact EKG/Rhythm:   
 
Extubation Date / Time:  
 
CT Output:  
 
Ventilator: 
Ventilator Volumes Vt Set (ml): 550 ml (19 08) Vt Exhaled (Machine Breath) (ml): 639 ml (19 08) Vt Spont (ml): 437 ml (19 1035) Ve Observed (l/min): 7.25 l/min (19 1035) Oxygen Therapy: 
Oxygen Therapy O2 Sat (%): 96 % (20 1256) Pulse via Oximetry: 75 beats per minute (20 1256) O2 Device: Nasal cannula(2 LPM) (20 1504) PEEP/CPAP (cm H2O): 1 cm H20 (20 1101) O2 Flow Rate (L/min): 2 l/min (20 1256) FIO2 (%): 21 % (20 0710) CXR: 
 
Admission Weight: Last Weight Weight: 192 lb 10.9 oz (87.4 kg) Weight: 184 lb 15.5 oz (83.9 kg) Intake / Sandralee Terrance / Drain: Current Shift: No intake/output data recorded. Last 24 hrs.:  
 
Intake/Output Summary (Last 24 hours) at 1/28/2020 2024 Last data filed at 1/28/2020 1504 Gross per 24 hour Intake 1900 ml Output 500 ml Net 1400 ml No results for input(s): CPK, CKMB, TROIQ in the last 72 hours. Recent Labs  
  01/28/20 
0435 01/27/20 
1018 01/26/20 
0352  137 140  
K 4.0 4.6 4.3  
CO2 29 29 29 BUN 18 17 18 CREA 0.99 1.05 1.03  
* 83 94 MG 2.0  --  2.0 WBC 3.9* 4.5 3.4* HGB 10.1* 10.3* 9.8* HCT 33.8* 34.5* 33.1*  
* 144* 136* Recent Labs  
  01/28/20 
0435 01/27/20 
1018 01/26/20 
0352 INR 1.4* 1.4* 1.8* PTP 13.8* 14.0* 17.5* No lab exists for component: PBNP Current Facility-Administered Medications:  
  clonazePAM (KlonoPIN) tablet 0.25 mg, 0.25 mg, Oral, TID, Polliard, Duaine Hernán T, NP, 0.25 mg at 01/28/20 1749 
  albuterol-ipratropium (DUO-NEB) 2.5 MG-0.5 MG/3 ML, 3 mL, Nebulization, TID RT, Polliard, Duaine Hernán T, NP, 3 mL at 01/28/20 1255   acetylcysteine (MUCOMYST) 200 mg/mL (20 %) solution 600 mg, 600 mg, Nebulization, TID RT, Polliard, Duaine Hernán T, NP, 600 mg at 01/28/20 1255   arformoteroL (BROVANA) neb solution 15 mcg, 15 mcg, Nebulization, BID RT **AND** budesonide (PULMICORT) 500 mcg/2 ml nebulizer suspension, 500 mcg, Nebulization, BID RT, Polliard, Duaine Hernán T, NP, 500 mcg at 01/28/20 0709 
  albumin human 25% (BUMINATE) solution 12.5 g, 12.5 g, IntraVENous, DAILY, Cheyenne Herrera NP, 12.5 g at 01/28/20 5055   [START ON 1/29/2020] epoetin yvonne-epbx (RETACRIT) injection 20,000 Units, 20,000 Units, SubCUTAneous, Q MON, WED & FRI, Sarah Herrera, NP 
  sodium chloride tablet 1 g, 1 g, Oral, BID WITH MEALS, Giorgio Gilbert MD, 1 g at 01/28/20 1095   potassium chloride SR (KLOR-CON 10) tablet 10 mEq, 10 mEq, Oral, DAILY, Shana Sims NP, 10 mEq at 01/28/20 0847 
  sodium chloride (NS) flush 5-40 mL, 5-40 mL, IntraVENous, PRN, Sarah Hardy, Nuria Zhou MD, 10 mL at 01/25/20 0702   naloxone [facility]) injection 0.4 mg, 0.4 mg, IntraVENous, PRN, Candie Cook MD 
  hydrALAZINE (APRESOLINE) 20 mg/mL injection 20 mg, 20 mg, IntraVENous, Q6H PRN, Levi, Shana B, NP, 20 mg at 01/28/20 0037   clonazePAM (KlonoPIN) tablet 0.25 mg, 0.25 mg, Oral, QHS PRN, Levi, Shana B, NP 
  meropenem (MERREM) 500 mg in 0.9% sodium chloride (MBP/ADV) 50 mL, 0.5 g, IntraVENous, Q6H, Juan C Olivares MD, Last Rate: 100 mL/hr at 01/28/20 1751, 500 mg at 01/28/20 1751   levETIRAcetam (KEPPRA) tablet 250 mg, 250 mg, Oral, BID, Javed Beck MD, 250 mg at 01/28/20 1321   senna-docusate (PERICOLACE) 8.6-50 mg per tablet 1 Tab, 1 Tab, Oral, PRN, Catalina Chamorro MD, 1 Tab at 01/18/20 1844   collagenase (SANTYL) 250 unit/gram ointment, , Topical, DAILY, Levi, Shana B, NP 
  fludrocortisone (FLORINEF) tablet 0.1 mg, 0.1 mg, Oral, DAILY, Levi, Shana B, NP, 0.1 mg at 01/28/20 8347   cholecalciferol (VITAMIN D3) (1000 Units /25 mcg) tablet 2 Tab, 2,000 Units, Oral, DAILY, Polliard, Paddy Holes, NP, 2 Tab at 01/28/20 0839 
  venlafaxine-SR (EFFEXOR-XR) capsule 150 mg, 150 mg, Oral, DAILY WITH BREAKFAST, Polliard, Paddy Holes, NP, 150 mg at 01/28/20 8735   hydrocortisone (CORTAID) 1 % cream, , Topical, TID PRN, Ellen Altman MD 
  thiamine HCL (B-1) tablet 100 mg, 100 mg, Oral, DAILY, Willian Taylor NP, 100 mg at 01/28/20 0727   oxybutynin (DITROPAN) tablet 5 mg, 5 mg, Oral, Q6H PRN, Shana Sims NP, 5 mg at 01/01/20 1204   magnesium oxide (MAG-OX) tablet 800 mg, 800 mg, Oral, BID, Willian Taylor NP, 800 mg at 01/28/20 7460   lidocaine (URO-JET/ GLYDO) 2 % jelly, , Urethral, PRN, Mounika Altman MD 
  albuterol-ipratropium (DUO-NEB) 2.5 MG-0.5 MG/3 ML, 3 mL, Nebulization, Q6H PRN, Raiza Jenkins MD, 3 mL at 01/25/20 5735   therapeutic multivitamin (THERAGRAN) tablet 1 Tab, 1 Tab, Oral, DAILY, Bello BHAKTA NP, 1 Tab at 01/28/20 0840 
  alteplase (CATHFLO) 1 mg in sterile water (preservative free) 1 mL injection, 1 mg, InterCATHeter, PRN, Mounika Toney MD, 1 mg at 01/20/20 1156   finasteride (PROSCAR) tablet 5 mg, 5 mg, Oral, DAILY, Deneen Romo MD, 5 mg at 01/28/20 3465   diphenhydrAMINE (BENADRYL) capsule 25 mg, 25 mg, Oral, QHS PRN, Alfonso Herrera NP, 25 mg at 01/19/20 0044   octreotide (SANDOSTATIN) injection 25 mcg, 25 mcg, SubCUTAneous, TID, Elsa Herrera NP, 25 mcg at 01/28/20 1757   pantoprazole (PROTONIX) tablet 40 mg, 40 mg, Oral, ACB, Elsa Herrera NP, 40 mg at 01/28/20 0726 
  lactobac ac& pc-s.therm-b.anim (TIAN Q/RISAQUAD), 1 Cap, Oral, DAILY, Dorothea Dailey MD, 1 Cap at 01/28/20 3815   oxyCODONE IR (ROXICODONE) tablet 5 mg, 5 mg, Oral, Q6H PRN, Silvana Kothari MD, 5 mg at 12/30/19 0405   tamsulosin (FLOMAX) capsule 0.4 mg, 0.4 mg, Oral, DAILY, Logan Kincaid MD, 0.4 mg at 01/28/20 2012   Warfarin MD/NP dosing, , Other, PRN, Mounika Altman MD 
  sodium chloride (NS) flush 5-40 mL, 5-40 mL, IntraVENous, Q8H, Harshal Denson MD, 10 mL at 01/28/20 0730 
  sodium chloride (NS) flush 5-40 mL, 5-40 mL, IntraVENous, PRN, Silvana Kothari MD, 10 mL at 01/20/20 1326   acetaminophen (TYLENOL) tablet 650 mg, 650 mg, Oral, Q6H PRN, Silvana Kothari MD, 650 mg at 01/21/20 1512 
  naloxone [facility]) injection 0.4 mg, 0.4 mg, IntraVENous, PRN, Silvana Kothari MD 
  ondansetron [facility]) injection 4 mg, 4 mg, IntraVENous, Q4H PRN, Silvana Kothari MD, 4 mg at 01/24/20 6570 
  sucralfate (CARAFATE) tablet 1 g, 1 g, Oral, AC&HS, Silvana Kothari MD, 1 g at 01/28/20 6371   influenza vaccine 2019-20 (6 mos+)(PF) (FLUARIX/FLULAVAL/FLUZONE QUAD) injection 0.5 mL, 0.5 mL, IntraMUSCular, PRIOR TO DISCHARGE, Mounika Altman MD 
 
A/P 
  
S/P LVAD - good flows Need for anti-coagulation - coumadin DM - insulin RV dysfunction - milrinone, diuretics  
  
Risk of morbidity and mortality - high Medical decision making - high complexity Signed By: Sherryle Distance, MD

## 2020-01-30 NOTE — DISCHARGE SUMMARY
[facility] Discharge Summary Patient ID: 
Verona Cyr [id number] 
71 y.o. 
1950 Admit date: 10/25/2019 Discharge date: 1/30/2020 Admitting Physician: Salma Gonzales MD  
 
Referring Cardiologist:  Natacha Maher MD 
 
PCP:  Anmol Reagan MD 
 
Admitting Diagnoses: chronic systolic heart failure, NYHA Class IV Discharge Diagnoses: chronic systolic heart failure, NYHA Class IV, s/p HM 3 implant as DT Hospital Problems  Date Reviewed: 1/26/2020 Codes Class Noted POA Tremor ICD-10-CM: R25.1 ICD-9-CM: 781.0  1/20/2020 Unknown Acute decompensated heart failure (HCC) ICD-10-CM: I50.9 ICD-9-CM: 428.0  10/25/2019 Unknown Discharged Condition: improved Disposition: home, see patient instructions for treatment and plan and transferred to [address] [place] [address] Procedures for this admission:  Procedure(s): RIGHT HEART CATH Discharge Medications: My Medications START taking these medications Instructions Each Dose to Equal Morning Noon Evening Bedtime  
acetylcysteine 200 mg/mL (20 %) solution Commonly known as:  MUCOMYST Your last dose was: Your next dose is:   
 
  
 3 mL by Nebulization route two (2) times a day. Indications: an increase in the thickness of lung secretions 600 mg 
  
  
  
  
  
albuterol-ipratropium 2.5 mg-0.5 mg/3 ml Nebu Commonly known as:  Cande Dove Your last dose was: Your next dose is:   
 
  
 3 mL by Nebulization route three (3) times daily. 3 mL 
  
  
  
  
  
arformoteroL 15 mcg/2 mL Nebu neb solution Commonly known as:  Meghan Kand Your last dose was: Your next dose is:   
 
  
 2 mL by Nebulization route two (2) times a day. 15 mcg 
  
  
  
  
  
budesonide 0.5 mg/2 mL Nbsp Commonly known as:  PULMICORT Your last dose was: Your next dose is:   
 
  
 2 mL by Nebulization route two (2) times a day. 500 mcg 
  
  
  
  
  
cholecalciferol (1000 Units /25 mcg) tablet Commonly known as:  VITAMIN D3 Start taking on:  January 31, 2020 Your last dose was: Your next dose is: Take 2 Tabs by mouth daily. 2,000 Units 
  
  
  
  
  
clonazePAM 0.5 mg tablet Commonly known as:  Kristan Garsia Your last dose was: Your next dose is: Take 0.5 Tabs by mouth three (3) times daily. Max Daily Amount: 0.75 mg. 
 0.25 mg 
  
  
  
  
  
collagenase 250 unit/gram ointment Commonly known as:  SANTYL Start taking on:  January 31, 2020 Your last dose was: Your next dose is:   
 
  
 Apply  to affected area daily. epoetin yvonne-epbx 10,000 unit/mL injection Commonly known as:  RETACRIT Start taking on:  January 31, 2020 Your last dose was: Your next dose is:   
 
  
 2 mL by SubCUTAneous route every Monday, Wednesday, Friday. Indications: anemia due to kidney failure 20,000 Units 
  
  
  
  
  
finasteride 5 mg tablet Commonly known as:  PROSCAR Start taking on:  January 31, 2020 Your last dose was: Your next dose is: Take 1 Tab by mouth daily. 5 mg 
  
  
  
  
  
fludrocortisone 0.1 mg tablet Commonly known as:  FLORINEF Start taking on:  January 31, 2020 Your last dose was: Your next dose is: Take 1 Tab by mouth daily. 0.1 mg 
  
  
  
  
  
L. acidoph & paracasei- S therm- Bifido 8 billion cell Cap cap Commonly known as:  TIAN-Q/RISAQUAD Start taking on:  January 31, 2020 Your last dose was: Your next dose is: Take 1 Cap by mouth daily. 1 Cap 
  
  
  
  
  
levETIRAcetam 250 mg tablet Commonly known as:  KEPPRA Your last dose was: Your next dose is: Take 1 Tab by mouth two (2) times a day. 250 mg 
  
  
  
  
  
magnesium oxide 400 mg tablet Commonly known as:  MAG-OX Your last dose was: Your next dose is: Take 2 Tabs by mouth two (2) times a day. 800 mg 
  
  
  
  
  
meropenem 500 mg, ADDaptor 1 Device IVPB Your last dose was: Your next dose is:   
 
  
 500 mg by IntraVENous route every six (6) hours. 500 mg 
  
  
  
  
  
octreotide 50 mcg/mL injection Commonly known as:  SANDOSTATIN Your last dose was: Your next dose is: 0.5 mL by SubCUTAneous route three (3) times daily. 25 mcg 
  
  
  
  
  
pantoprazole 40 mg tablet Commonly known as:  PROTONIX Start taking on:  January 31, 2020 Your last dose was: Your next dose is: Take 1 Tab by mouth Daily (before breakfast). 40 mg 
  
  
  
  
  
potassium chloride SR 10 mEq tablet Commonly known as:  KLOR-CON 10 Start taking on:  January 31, 2020 Your last dose was: Your next dose is: Take 1 Tab by mouth daily. 10 mEq 
  
  
  
  
  
sucralfate 1 gram tablet Commonly known as:  Ressie Flavin Your last dose was: Your next dose is: Take 1 Tab by mouth Before breakfast, lunch, dinner and at bedtime. 1 g 
  
  
  
  
  
therapeutic multivitamin tablet Commonly known as:  [facility] Start taking on:  January 31, 2020 Your last dose was: Your next dose is: Take 1 Tab by mouth daily. 1 Tab 
  
  
  
  
  
venlafaxine- mg capsule Commonly known as:  EFFEXOR-XR Start taking on:  January 31, 2020 Your last dose was: Your next dose is: Take 1 Cap by mouth daily (with breakfast). 150 mg CONTINUE taking these medications Instructions Each Dose to Equal Morning Noon Evening Bedtime  
aspirin delayed-release 81 mg tablet Your last dose was: Your next dose is: Take 81 mg by mouth daily. 81 mg 
  
  
  
  
  
tamsulosin 0.4 mg capsule Commonly known as:  FLOMAX Your last dose was: Your next dose is: Take 0.4 mg by mouth daily. 0.4 mg 
  
  
  
  
  
  
STOP taking these medications   
amiodarone 200 mg tablet Commonly known as:  CORDARONE 
  
  
carvediloL 6.25 mg tablet Commonly known as:  Gabriel Jeffers 
 Eliquis 5 mg tablet Generic drug:  apixaban 
  
  
escitalopram oxalate 5 mg tablet Commonly known as:  LEXAPRO 
  
  
milrinone 20 mg/100 mL (200 mcg/mL) infusion Commonly known as:  PRIMACOR 
  
  
spironolactone 25 mg tablet Commonly known as:  ALDACTONE 
  
  
torsemide 20 mg tablet Commonly known as:  DEMADEX Where to Get Your Medications These medications were sent to [address] [place] [address], [address] [place] [address] [address] [place] [place] [address] [address] Phone:  [phone] · therapeutic multivitamin tablet Information on where to get these meds will be given to you by the nurse or doctor. Ask your nurse or doctor about these medications · acetylcysteine 200 mg/mL (20 %) solution · albuterol-ipratropium 2.5 mg-0.5 mg/3 ml Nebu 
· arformoteroL 15 mcg/2 mL Nebu neb solution · budesonide 0.5 mg/2 mL Nbsp · cholecalciferol (1000 Units /25 mcg) tablet · clonazePAM 0.5 mg tablet · collagenase 250 unit/gram ointment · epoetin yvonne-epbx 10,000 unit/mL injection · finasteride 5 mg tablet · fludrocortisone 0.1 mg tablet · L. acidoph & paracasei- S therm- Bifido 8 billion cell Cap cap · levETIRAcetam 250 mg tablet · magnesium oxide 400 mg tablet · meropenem 500 mg, ADDaptor 1 Device IVPB · octreotide 50 mcg/mL injection · pantoprazole 40 mg tablet · potassium chloride SR 10 mEq tablet · sucralfate 1 gram tablet · venlafaxine- mg capsule INR TARGET- 1.5-2.0 INR LEVEL to be drawn - Friday, January 31, 2020 INR management per [facility] IP rehab; to be managed by [facility] 1721 upon discharge from rehab Oxygen: 2L NC  
 
 
HPI: 
Princess Kim is a 71y.o. year old white male with a history of HTN, HLD, JOSE, CAD s/p cardiac arrest VFib s/p CABG (2011) c/b sternal would infection and sternectomy, ischemic cardiomyopathy LVEF 15-20%, s/p ICD and with LBBB.  He was admitted with acute on chronic systolic heart failure with massive volume overload > 20 lbs, in the setting of atrial fibrillation s/p failed DCCV and underwent DCCV and RHC on 6/18/19. He subsequently underwent BiVICD on 6/21/19 with Dr. Odilon Rogers. He was discharged home home on IV milrinone on 6/26/19 as he did not wish to consider LVAD evaluation at that time. He has been followed closely by Dr. Sofie Keenan and the [facility]. Amor Mckay was readmitted to [facility] on 10/25/19 with worsening of his HFrEF. He was placed on a bumex infusion and LVAD evaluation was initiated. Dobutamine was added to provide dual inotropic support. Despite maximal inotropic support, he continued to demonstrate progressively worsening renal and hepatic function and he underwent placement of Impella 5.0 on 10/29/19 with Dr. Celestino Sheth. He had improvement in end organ perfusion. He worked aggressively with PT, OT, and nutrition to prepare for durable MCS implant. Sona' post-Impella course was complicated by hematuria and acute blood loss anemia due to the anticoagulation required for Impella. Despite minimization of anticoagulation, he continued to have natty hematuria and underwent cystoscopy with Dr. Joaquina Lynn on 11/13/19 which was remarkable for catheter related cystitis. Amor Mckay remained hemodynamically stable and after approval by the Medical Review Board, underwent removal of Imeplla 5.0 and implantation of HeartMate 3 LVAD on 11/18/19 with Dr. Celestino Sheth. His post-op course was complicated by RV dysfunction, respiratory failure, and debility. He developed left forearm pain and arterial duplex confirmed left brachial thrombus for which he underwent left brachial thrombectomy on 11/25/19 with Dr. Jessica Evans. Sona underwent slow milrinone wean with the assistance of sildenafil. With the assistance of nephrology, his diuretics were adjusted and his O2 requirement decreased. Amor Mckay continued to work with PT/OT and SLP for his therapy needs.   He developed leukocytosis and CXR was concerning for aspiration pneumonia; he was made NPO and a DHT placed for nutrition. In addition, he developed hematuria and urine culture was + for serratia marcescens and pseudomonas. He was placed on multiple antibiotics for his UTI, but despite aggressive management, developed pseudomonas bacteremia. His last positive blood culture was 12/31. Sona also has chronic issues with severe orthostasis, due in part to Flomax and Proscar which are unable to be discontinued due to urinary retention and concern for re-development of UTI. His INR goal was decreased to 1.5-2.0 due to recurrence of intermittent hematuria. Radha Diana had a ramp TTE which showed significantly enlarged LVIDd (> 6cm) despite increase in RPMs; he underwent CTA to rule out outflow graft obstruction which was negative. Sona developed severe myoclonus which has improved with addition of Keppra and Klonopin. Cefepime (treatment for UTI) significant worsened the myoclonus and was changed to [facility], which he will need to remain on until February 14th, and then transitioned to PO fluoroquinolone per ID. Pre-op evaluation revealed mediastinal lymphadenopathy, concerning for malignancy. PET scan was obtained which showed hypermetabolic activity in right middle lobe of the lung, but this was reviewed by Dr. Willow Vivas with Oncology and deemed to be inflammation, not malignancy. RHC was performed 1/21 which showed normal CI and low filling pressures; his goal weight is 178 lbs, speed is 5800 RPMs with occasional PI events noted on interrogation. He has severe JOSE but is unable to tolerate his CPAP mask at night. He remains intermittently dyspneic despite fluid status, likely due to severe obstructive disease noted on PFT prior to implant. He is on scheduled nebulized steroids/bronchodilators and 2L NC. Klonopin is also helpful in reducing the dyspnea.   He has required daily dosing of his diuretics with the guidance of daily orthostatics. His orthostasis has been minimized with the addition of Florinef. He remains off all GDMT due to hypotension and JUAN J on CKD. He continues to work with PT/OT and is stable for d/c to IP rehab. He will be followed closely by the [facility] as an outpatient. Discharge Vital Signs:  
Visit Vitals BP 91/72 (BP 1 Location: Left arm, BP Patient Position: At rest) Pulse (!) 110 Temp 97.6 °F (36.4 °C) Resp 16 Ht 6' 2\" (1.88 m) Wt 188 lb 0.8 oz (85.3 kg) SpO2 100% BMI 24.14 kg/m² Labs:  
Recent Labs  
  01/30/20 
0408 WBC 3.6* HGB 9.5* HCT 32.0*  
*   
K 3.8 BUN 17 CREA 0.98  
GLU 82 INR 1.6* Diagnostics:  
Study Result EXAM:  CTA CHEST W OR W WO CONT 
  
INDICATION: Acute decompensated heart failure, LVAD. Possible outflow. Thrombus. 
  
COMPARISON: CT angiography chest on 5/31/2019. 
  
TECHNIQUE: Helical thin section chest CT following uneventful intravenous 
administration of nonionic contrast 100 mL of isovue 370 according to 
departmental aorta protocol. Coronal and sagittal reformats were performed. 3D 
post processing was performed. CT dose reduction was achieved through the use 
of a standardized protocol tailored for this examination and automatic exposure 
control for dose modulation. 
  
FINDINGS: LVAD is in good position. No surrounding fluid collection. Outflow 
tubing connects the LVAD to the anterior margin of the distal aortic arch. Mild 
peripheral thrombus in the tubing. No stenosis. No occlusive thrombus. 
  
Aorta is atherosclerotic without aneurysm or dissection. 
  
This is a good quality study for the evaluation of pulmonary embolism to the 
first subsegmental arterial level. A radiodense linear foreign body measures 13 
mm and is new but nonocclusive in the posterior basilar left lower lobar artery. 
  
  
Battery pack is in the left chest wall.  Leads extend into the right ventricle, 
 right atrium, and coronary sinus. THYROID: No nodule. MEDIASTINUM: Lymphadenopathy is unchanged and nonspecific. LUCERO: Lymphadenopathy is unchanged and nonspecific. HEART: Large cardiac silhouette. No effusion. ESOPHAGUS: No wall thickening or dilatation. TRACHEA/BRONCHI: Patent. PLEURA: Small right pleural effusion is unchanged. Trace left pleural effusion 
is decreased. No pneumothorax. LUNGS: Centrilobular emphysema is unchanged. Interstitial pulmonary edema is 
decreased and nearly completely resolved. Bilateral lower lobe dependent 
airspace opacities heterogeneously enhance. No lung mass. UPPER ABDOMEN: Partially imaged. No acute pathology. BONES: No aggressive bone lesion or fracture. Incidental gynecomastia is unchanged. 
  
IMPRESSION IMPRESSION:  
1. No occlusion or stenosis of the LVAD outflow tubing. 2. Nonocclusive radiodense foreign body in the posterior basilar left lower 
lobar artery. 3. Decreased pulmonary edema. Bilateral lower lobe atelectasis more likely than 
pneumonia. 4. Unchanged lymphadenopathy. Other incidental findings are described above. 
   
 
Study Result PORTABLE CHEST RADIOGRAPH/S: 1/30/2020 4:41 AM 
  
INDICATION: Post LVAD implant. 
  
COMPARISON: 1/28/2020, 1/26/2020, 1/24/2020, 1/22/2020. CT chest 1/14/2020. 
  
TECHNIQUE: Portable frontal upright radiograph/s of the chest. 
  
FINDINGS:  
A right PICC, pulmonary arterial monitoring device (left lower lobar artery), 
pacemaker/AICD, and LVAD are in place. A small portion of the costophrenic 
angles are excluded. Pulmonary vascular congestion and interstitial edema are 
stable. An apparent nodule at the level of the aortic arch in the right midlung 
corresponds to ground glass opacity along the major fissure on recent CT. The 
central airways are patent. No pneumothorax and no large pleural effusion. 
  
IMPRESSION IMPRESSION:  
Stable pulmonary vascular congestion and interstitial edema. Study Result PET/CT SCAN 
  
PROCEDURE: Following IV injection of 10.9 mCi 18 Fluoro 2 deoxyglucose (FDG) and 
a standard uptake delay, PET imaging is performed from the skull vertex to mid 
thigh and axial, sagittal and coronal images were acquired. Unenhanced CT is 
obtained for anatomic localization, and attenuation correction of the PET scan. Patient preprocedure blood glucose level: 99mg/dL. 
  
CORRELATIVE IMAGING STUDIES: Chest CT 2020. 
  
PRIOR PET: None 
  
HISTORY: The study is requested for evaluation of lymphadenopathy. 
  
FINDINGS: 
  
HEAD/NECK: No apparent foci of abnormal hypermetabolism. Cerebral evaluation is 
limited by normal intense activity. 
  
CHEST: Hypermetabolic mediastinal lymph nodes are noted, maximum SUV is 5.6. Parenchymal opacity in the right middle lobe is hypermetabolic, maximum SUV is 
5.9. Other hypermetabolic focal infiltrates are noted in the lower lobes 
bilaterally. 
  
ABDOMEN/PELVIS: No foci of abnormal hypermetabolism. 
  
SKELETON: No foci of abnormal hypermetabolism in the axial and visualized 
appendicular skeleton. 
  
IMPRESSION IMPRESSION: Bilateral pulmonary infiltrates are hypermetabolic and increased 
compared to the prior CT. Mediastinal lymph nodes are mildly hypermetabolic, 
which is a nonspecific finding. 
   
 
Patient Information Patient Name Matt Bailey MRN 
[id number] Sex Male    
1950 (75 y.o.) Reason for Exam  
Priority: Routine RV failure Comments:   
Vitals Weight Height BSA (calculated - sq m) BP Pulse (Heart Rate) Interpretation Summary · Severely dilated left ventricle. Upper normal wall thickness. Severe systolic dysfunction. Estimated left ventricular ejection fraction is <15%. Left ventricular diastolic dysfunction. · Mitral valve thickening. Minimal mitral valve prolapse of the anterior mitral valve leaflet. Trace mitral valve regurgitation is present. · Mild tricuspid valve regurgitation is present. · Mildly dilated right ventricle. Moderately reduced RV systolic function (TAPSE 1.3 cm, RV S' 6 cm/s). Pacer/ICD present. · Mildly biatrial enlargement · Severely elevated central venous pressure (15+ mmHg); IVC diameter is larger than 21 mm and collapses less than 50% with respiration. · LVAD inflow cannula spectral Doppler tracings not obtained. Comparison Study Information Prior Study There is a prior study available for comparison. Prior study date: 1/14/2020. Echo Findings Left Ventricle Severely dilated left ventricle. Upper normal wall thickness. Severe systolic dysfunction. The estimated ejection fraction is <15%. There is left ventricular diastolic dysfunction. Wall Scoring The left ventricular wall motion is globally hypokinetic. Left Atrium Mildly dilated left atrium. Right Ventricle Mildly dilated right ventricle. Moderately reduced systolic function. TAPSE 1.3 cm, RV S' 6 cm/sPacer/ICD present. Right Atrium Mildly dilated right atrium. Aortic Valve Normal valve structure and no stenosis. AV remains closed throughout the cardiac cycle. Trace aortic valve regurgitation. AV remains closed with each systole. Ferny Plough Mitral Valve No stenosis. Mitral valve thickening and increased E-point septal separation. There is mild anterior leaflet thickening. Mitral valve prolapse. Trace regurgitation. Tricuspid Valve Normal valve structure and no stenosis. Mild regurgitation. Pulmonic Valve Normal valve structure and no stenosis. Trace regurgitation. Aorta Normal aortic root. IVC/Hepatic Veins Inferior vena cava not well visualized. Severely elevated central venous pressure (15+ mmHg); IVC diameter is larger than 21 mm and collapses less than 50% with respiration. Pericardium Normal pericardium and no evidence of pericardial effusion. Wall Scoring Score Index: 2.000 Percent Normal: 0.0% The left ventricular wall motion is globally hypokinetic. TTE procedure Findings TTE Procedure Information Image quality: good. The view(s) performed were parasternal and apical. Color flow Doppler was performed and pulse wave and/or continuous wave Doppler was performed. No contrast was given. Procedure Staff Technologist/Clinician: Kei Stoll Supporting Staff: None Performing Physician/Midlevel: None 
  
Exam Completion Date/Time: 1/29/20  2:17 PM  
  
  
2D/M-Mode Measurements Dimensions Measurement Value (Range) LVIDs 5.05 cm LVIDd 5.79 cm (4.2 - 5.9) IVSd 0.91 cm (0.6 - 1.0) LVPWd 1.13 cm (0.6 - 1.0) LV Mass .5 g (88 - 224) LV Mass AL Index 135 g/m2 (49 - 115) Left Atrium Major Axis 4.41 cm Tapse 1.29 cm (1.5 - 2.0) RVIDd 4.55 cm Aortic Valve Measurements LVOT  
LVOT d 2.28 cm Tricuspid Valve Measurements Regurgitation Triscuspid Valve Regurgitation Peak Gradient 29.2 mmHg  
  
TR Max Velocity 270.15 cm/s Patient Instructions/Follow Up Care: 
Discharge instructions were reviewed with the patient and family present. Questions were also answered at this time. Prescriptions and medications were reviewed. The patient will follow up with the [facility] upon discharge from  rehab. The patient was also instructed to follow up with his primary care physician as needed. The patient and family were encouraged to call with any questions or concerns. Signed:  
Bharathi Agarwal NP 
1/30/2020 
12:08 PM  
 
F ATTENDING ADDENDUM Patient was seen and examined in person. Data and notes were reviewed. I have discussed and agree with the plan as noted in the NP note above without further additions. Corrine Martin MD PhD 
[facility] [address] [facility]

## 2020-01-30 NOTE — PROGRESS NOTES
Transitions of Care Plan: 
    Discharge today - [address] transport 56 - CM received call from Aidan Guadalupe (p: [phone]) at [facility]. Bed available for patient today. Request patient to arrive prior to 2PM. CARLOS ALBERTO: 
RN Report #:  [id number] Accepting MD:  Tracy Bonilla MD 
[facility] [address] [place] [address] Road Transport Requested  time: 1300 
 
0810 - CM updated [facility] NP and SW of same. Requested confirmation if patient still medically stable for transition of care. 36 - CM spoke with [facility] NP. Update provided. [facility] team will review patient's progress overnight. Confident cleared for discharge. Request 1PM transport - critical care. 56 - CM requested ALS/critical care transport through [facility] ([phone]). Requested  for 1300. 
 
1020 - CM called patient's spouse, Nidia Méndez, provided update on patient's discharge plan to [facility] today around [address] Jm Hernandez will be at [facility] mid morning. 1026 -  received approval for 1300PM transport time with [address] [facility] [address] Team. 
 
1030 - Medicare pt has received, reviewed, and signed 2nd IM letter informing them of their right to appeal the discharge. Signed copied has been placed on pt bedside chart. BCPI-A letter regarding Heart Failure has been delivered to the patient/caregiver. 1230 - CM placed transportation packet on hard chart. Notified [facility] RN of same. CM will continue to follow.  
 
Boris Piedra, MPH

## 2020-01-30 NOTE — PROGRESS NOTES
Problem: Falls - Risk of 
Goal: *Absence of Falls Description Document Viktoriya Whitmore Fall Risk and appropriate interventions in the flowsheet. Outcome: Progressing Towards Goal 
Note: Fall Risk Interventions: 
Mobility Interventions: Communicate number of staff needed for ambulation/transfer, OT consult for ADLs, Patient to call before getting OOB, PT Consult for mobility concerns Mentation Interventions: Adequate sleep, hydration, pain control, Evaluate medications/consider consulting pharmacy Medication Interventions: Evaluate medications/consider consulting pharmacy, Patient to call before getting OOB, Teach patient to arise slowly Elimination Interventions: Call light in reach, Patient to call for help with toileting needs History of Falls Interventions: Evaluate medications/consider consulting pharmacy, Room close to nurse's station Problem: Pain Goal: *Control of Pain Outcome: Progressing Towards Goal 
  
Problem: Heart Failure: Discharge Outcomes Goal: *Left ventricular function assessment completed prior to or during stay, or planned for post-discharge Outcome: Progressing Towards Goal 
Goal: *Verbalizes understanding and describes prescribed diet Outcome: Progressing Towards Goal 
Goal: *Verbalizes understanding/describes prescribed medications Outcome: Progressing Towards Goal 
Goal: *Describes available resources and support systems Description 
(eg: Home Health, Palliative Care, Advanced Medical Directive) Outcome: Progressing Towards Goal 
Goal: *Describes smoking cessation resources Outcome: Progressing Towards Goal 
Goal: *Understands and describes signs and symptoms to report to providers(Stroke Metric) Outcome: Progressing Towards Goal 
Goal: *Describes/verbalizes understanding of follow-up/return appt Description 
(eg: to physicians, diabetes treatment coordinator, and other resources Outcome: Progressing Towards Goal 
 Goal: *Describes importance of continuing daily weights and changes to report to physician Outcome: Progressing Towards Goal 
  
Problem: Discharge Planning Goal: *Discharge to safe environment Outcome: Progressing Towards Goal

## 2020-01-30 NOTE — PROGRESS NOTES
0740:  Bedside and Verbal shift change report given to Luis Alberto Stone RN (oncoming nurse) by Mahsa Chavez RN (offgoing nurse). Report included the following information SBAR, Kardex, OR Summary, Procedure Summary, Intake/Output, MAR, Recent Results, Med Rec Status and Cardiac Rhythm Paced vs A-Fib.

## 2020-01-30 NOTE — PROGRESS NOTES
4081 [facility] [address] in [place], [place] Inpatient Progress Note Patient name: Marissa Villalba Patient : 1950 Patient MRN: [id number] Attending MD: Carolin Patterson MD 
Date of service: 20 CHIEF COMPLAINT: 
De azucena LVAD implant 
  
PLAN: 
Continue current device speed at 5800rpm 
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 Kenny Dailey would like to pull sternal wire once stable ~3 months from implant () INR 1.6 - 4 mg warfarin tonight Continue physical therapy Nutritional consult; persistently low prealbumin CAROL hose Discharge to [facility] rehab today  
  
IMPRESSION: 
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 
  
CARDIAC IMAGING: 
Chest CTA- no outflow graft occlusion Pet Scan equivocal for amyloid Send [employer]- pending  
  
OTHER IMAGING: 
Head CT negative for acute process EEG suggestive of mild generalized encephalopathic process, possibly related to underlying structural brain injury vs metabolic abnormality 
  
INTERVAL HISTORY: 
Weight up 4 lbs in 24 hrs Occasional PI events Hemodynamics at goal, MAP 80-90s, HR 70s INR 1.6 LVAD INTERROGATION: 
Device interrogated in person Device function normal, normal flow, no events LVAD Pump Speed (RPM): 5800 Pump Flow (LPM): 5.6 MAP: 70(up in chair with activity) PI (Pulsitility Index): 2.1 Power: 4.5  Test: No 
Back Up  at Bedside & Labeled: Yes Power Module Test: No 
Driveline Site Care: No 
Driveline Dressing: Clean, Dry, and Intact Outpatient: No 
MAP in Therapeutic Range (Outpatient): Yes Testing Alarms Reviewed: Yes 
Back up SC speed: 5800 Back up Low Speed Limit: 5400 Emergency Equipment with Patient?: Yes Emergency procedures reviewed?: Yes Drive line site inspected?: No 
Drive line intergrity inspected?: Yes Drive line dressing changed?: No 
 
PHYSICAL EXAM: 
Visit Vitals BP 91/72 (BP 1 Location: Left arm, BP Patient Position: At rest) Pulse (!) 110 Temp 97.6 °F (36.4 °C) Resp 16 Ht 6' 2\" (1.88 m) Wt 188 lb 0.8 oz (85.3 kg) SpO2 100% BMI 24.14 kg/m² General: Patient is well developed, well-nourished in no acute distress HEENT: Normocephalic and atraumatic. No scleral icterus. Pupils are equal, round and reactive to light and accomodation. No conjunctival injection. Oropharynx is clear. Flushed. Neck: Supple. No evidence of thyroid enlargements or lymphadenopathy. JVD: Cannot be appreciated Lungs: Breath sounds are equal and clear bilaterally. No wheezes, rhonchi, or rales. Heart: Regular rate and rhythm with normal S1 and S2. No murmurs, gallops or rubs. +LVAD hum. Abdomen: Soft, no mass or tenderness. No organomegaly or hernia. Bowel sounds present. Genitourinary and rectal: deferred Extremities: No cyanosis, clubbing. 2+ peripheral edema. Neurologic: No focal sensory or motor deficits are noted. Grossly intact. Psychiatric: Awake, alert an doriented x 3. Appropriate mood and affect. Skin: Warm, dry and well perfused. No lesions, nodules or rashes are noted. REVIEW OF SYSTEMS: 
General: Denies fever, night sweats. Ear, nose and throat: Denies difficulty hearing, sinus problems, runny nose, post-nasal drip, ringing in ears, mouth sores, loose teeth, ear pain, nosebleeds, sore throat, facial pain or numbess Cardiovascular: see above in the interval history Respiratory: Denies cough, wheezing, sputum production, hemoptysis. Gastrointestinal: Denies heartburn, constipation, intolerance to certain foods, diarrhea, abdominal pain, nausea, vomiting, difficulty swallowing, blood in stool Kidney and bladder: Denies painful urination, frequent urination, urgency, prostate problems and impotence Musculoskeletal: Denies joint pain, muscle weakness Skin and hair: Denies change in existing skin lesions, hair loss or increase, breast changes PAST MEDICAL HISTORY: 
Past Medical History:  
Diagnosis Date  Degenerative disc disease, lumbar  Heart failure ([address]  High cholesterol  Hypertension  Paroxysmal atrial fibrillation ([address] 4/2/2019  Spinal stenosis PAST SURGICAL HISTORY: 
 Past Surgical History:  
Procedure Laterality Date  COLONOSCOPY Left 2019 COLONOSCOPY at bedside performed by Alexa Escudero MD at [facility]  HX CORONARY ARTERY BYPASS GRAFT    
 triple  HX HERNIA REPAIR    
 HX IMPLANTABLE CARDIOVERTER DEFIBRILLATOR  RI CARDIOVERSION ELECTIVE ARRHYTHMIA EXTERNAL N/A 6/10/2019 EP CARDIOVERSION performed by Shiv Young MD at [address] [facility] CATH LAB  RI CARDIOVERSION ELECTIVE ARRHYTHMIA EXTERNAL N/A 2019 EP CARDIOVERSION performed [place]mkar Perez [place] [address], [address] CATH LAB  RI INSJ/RPLCMT PERM DFB W/TRNSVNS LDS 1/DUAL CHMBR N/A 2019 INSERT ICD BIV MULTI performed by Zachary Francis MD at [place] [address], [facility] [address] CATH LAB  RI TCAT IMPL WRLS P-ART PRS SNR L-T HEMODYN MNTR N/A 2019 IMPLANT HEART FAILURE MONITORING DEVICE performed by Darby Ordaz MD at [place] [address], [facility] CATH LAB FAMILY HISTORY: 
Family History Problem Relation Age of Onset  Lung Disease Mother  Hypertension Mother [address] [facility] Arthritis-osteo Mother  Heart Disease Mother  Heart Disease Father  Diabetes Father SOCIAL HISTORY: 
Social History Socioeconomic History  Marital status:  Spouse name: Not on file  Number of children: Not on file  Years of education: Not on file  Highest education level: Not on file Tobacco Use  Smoking status: Former Smoker Last attempt to quit: 2010 Years since quittin.1  Smokeless tobacco: Never Used Substance and Sexual Activity  Alcohol use: Not Currently Comment: rarely  Drug use: Never Other Topics Concern LABORATORY RESULTS: 
  
Labs Latest Ref Rng & Units 2020 WBC 4.1 - 11.1 K/uL 3.6(L) 3. 9(L) 3. 9(L) 4.5 3.4(L) 3. 9(L) 4.2  
RBC 4.10 - 5.70 M/uL 3.24(L) 3.37(L) 3.44(L) 3.50(L) 3.34(L) 3.30(L) 3.35(L) Hemoglobin 12.1 - 17.0 g/dL 9.5(L) 10. 1(L) 10. 1(L) 10. 3(L) 9.8(L) 9.7(L) 9.8(L) Hematocrit 36.6 - 50.3 % 32. 0(L) 33. 0(L) 33. 8(L) 34. 5(L) 33. 1(L) 32. 5(L) 33. 0(L) MCV 80.0 - 99.0 FL 98.8 97.9 98.3 98.6 99. 1(H) 98.5 98.5 Platelets 114 - 867 K/uL 104(L) 129(L) 136(L) 144(L) 136(L) 124(L) 109(L) Lymphocytes 12 - 49 % - - - - - - - Monocytes 5 - 13 % - - - - - - - Eosinophils 0 - 7 % - - - - - - - Basophils 0 - 1 % - - - - - - - Bands 0 - 6 % - - - - - - - Blasts 0 % - - - - - - - Albumin 3.5 - 5.0 g/dL 2. 7(L) 2. 8(L) 2. 7(L) 2. 8(L) 2. 6(L) 2. 3(L) 2. 2(L) Calcium 8.5 - 10.1 MG/DL 8.7 8.6 8.7 8.7 8.7 8.7 9.0 SGOT 15 - 37 U/L 10(L) 12(L) 14(L) 15 15 14(L) 18 Glucose 65 - 100 mg/dL 82 99 102(H) 83 94 91 88 BUN 6 - 20 MG/DL 17 18 18 17 18 18 23(H) Creatinine 0.70 - 1.30 MG/DL 0.98 1.04 0.99 1.05 1.03 0.94 1.06 Sodium 136 - 145 mmol/L 138 138 136 137 140 138 137 Potassium 3.5 - 5.1 mmol/L 3.8 4.3 4.0 4.6 4.3 4.4 4.9 TSH 0.36 - 3.74 uIU/mL - - - - - - -  
LDH 85 - 241 U/L 166 - 175 - 174 170 204 CEA ng/mL - - - - 5.4 - - Some recent data might be hidden Lab Results Component Value Date/Time TSH 2.30 12/03/2019 03:29 AM  
 TSH 2.40 10/25/2019 07:39 PM  
 TSH 2.45 06/01/2019 04:16 AM  
 
 
ALLERGY: 
Allergies Allergen Reactions  Cefepime Other (comments)  
  myoclonus CURRENT MEDICATIONS: 
 
Current Facility-Administered Medications:  
  clonazePAM (KlonoPIN) tablet 0.25 mg, 0.25 mg, Oral, TID, Polliard, Jettie [place] T, NP, 0.25 mg at 01/30/20 1006   albuterol-ipratropium (DUO-NEB) 2.5 MG-0.5 MG/3 ML, 3 mL, Nebulization, TID RT, Polliard, Jettie [place] T, NP, 3 mL at 01/30/20 0710   acetylcysteine (MUCOMYST) 200 mg/mL (20 %) solution 600 mg, 600 mg, Nebulization, TID RT, Polliard, Jettie [place] T, NP, 600 mg at 01/30/20 0710 
  arformoteroL (BROVANA) neb solution 15 mcg, 15 mcg, Nebulization, BID RT, 15 mcg at 01/29/20 0043 **AND** budesonide (PULMICORT) 500 mcg/2 ml nebulizer suspension, 500 mcg, Nebulization, BID RT, Alfonso Herrera, NP, 500 mcg at 01/30/20 0710 
  albumin human 25% (BUMINATE) solution 12.5 g, 12.5 g, IntraVENous, DAILY, Alfonso Herrera, NP, 12.5 g at 01/30/20 1002   epoetin yvonne-epbx (RETACRIT) injection 20,000 Units, 20,000 Units, SubCUTAneous, Q MON, WED & FRI, Elsa Herrera, NP, 20,000 Units at 01/29/20 2253   potassium chloride SR (KLOR-CON 10) tablet 10 mEq, 10 mEq, Oral, DAILY, Shana Sims, NP, 10 mEq at 01/30/20 2267   sodium chloride (NS) flush 5-40 mL, 5-40 mL, IntraVENous, PRN, Jose Cuello MD, 20 mL at 01/30/20 1130 
  naloxone [facility]) injection 0.4 mg, 0.4 mg, IntraVENous, PRN, Jose Cuello MD 
  hydrALAZINE (APRESOLINE) 20 mg/mL injection 20 mg, 20 mg, IntraVENous, Q6H PRN, Shana Sims, NP, 20 mg at 01/29/20 0410   clonazePAM (KlonoPIN) tablet 0.25 mg, 0.25 mg, Oral, QHS PRN, Alexi Simsin B, NP 
  meropenem (MERREM) 500 mg in 0.9% sodium chloride (MBP/ADV) 50 mL, 0.5 g, IntraVENous, Q6H, Juan C Olivares MD, Last Rate: 100 mL/hr at 01/30/20 1130, 500 mg at 01/30/20 1130   levETIRAcetam (KEPPRA) tablet 250 mg, 250 mg, Oral, BID, Javed Beck MD, 250 mg at 01/30/20 0103   senna-docusate (PERICOLACE) 8.6-50 mg per tablet 1 Tab, 1 Tab, Oral, PRN, Juan Pappas MD, 1 Tab at 01/18/20 9195   collagenase (SANTYL) 250 unit/gram ointment, , Topical, DAILY, Levi, Shana B, NP 
  fludrocortisone (FLORINEF) tablet 0.1 mg, 0.1 mg, Oral, DAILY, Levi, Shana B, NP, 0.1 mg at 01/30/20 9751   cholecalciferol (VITAMIN D3) (1000 Units /25 mcg) tablet 2 Tab, 2,000 Units, Oral, DAILY, Elsa Herrera, NP, 2 Tab at 01/30/20 8188   venlafaxine-SR (EFFEXOR-XR) capsule 150 mg, 150 mg, Oral, DAILY WITH BREAKFAST, Alfonso Herrera, NP, 150 mg at 01/30/20 1567   hydrocortisone (CORTAID) 1 % cream, , Topical, TID PRN, Venson Cleverly, Jonetta Blizzard, MD 
   thiamine HCL (B-1) tablet 100 mg, 100 mg, Oral, DAILY, Rafael Bitters, NP, 100 mg at 01/30/20 2324   oxybutynin (DITROPAN) tablet 5 mg, 5 mg, Oral, Q6H PRN, Levi, Shana B, NP, 5 mg at 01/01/20 1204   magnesium oxide (MAG-OX) tablet 800 mg, 800 mg, Oral, BID, Rafael Bitters, NP, 800 mg at 01/30/20 8278   lidocaine (URO-JET/ GLYDO) 2 % jelly, , Urethral, PRN, Mounika Altman MD 
  albuterol-ipratropium (DUO-NEB) 2.5 MG-0.5 MG/3 ML, 3 mL, Nebulization, Q6H PRN, Vinny Batista MD, 3 mL at 01/25/20 4941   therapeutic multivitamin (THERAGRAN) tablet 1 Tab, 1 Tab, Oral, DAILY, Levi, Shana B, NP, 1 Tab at 01/30/20 2774   alteplase (CATHFLO) 1 mg in sterile water (preservative free) 1 mL injection, 1 mg, InterCATHeter, PRN, Reginol Mounika Peterson MD, 1 mg at 01/20/20 1156   finasteride (PROSCAR) tablet 5 mg, 5 mg, Oral, DAILY, Sasha Hurtado MD, 5 mg at 01/30/20 8021   diphenhydrAMINE (BENADRYL) capsule 25 mg, 25 mg, Oral, QHS PRN, Polliard, Charyl Merlos T, NP, 25 mg at 01/19/20 0044   octreotide (SANDOSTATIN) injection 25 mcg, 25 mcg, SubCUTAneous, TID, Polliard, Charyl Merlos T, NP, 25 mcg at 01/30/20 1024   pantoprazole (PROTONIX) tablet 40 mg, 40 mg, Oral, ACB, Polliard, Charyl Merlos T, NP, 40 mg at 01/30/20 2859   lactobac ac& pc-s.therm-b.anim (TIAN Q/RISAQUAD), 1 Cap, Oral, DAILY, Chase Brandon MD, 1 Cap at 01/30/20 9609   oxyCODONE IR (ROXICODONE) tablet 5 mg, 5 mg, Oral, Q6H PRN, Linda Rios MD, 5 mg at 12/30/19 0405   tamsulosin (FLOMAX) capsule 0.4 mg, 0.4 mg, Oral, DAILY, Logan Kincaid MD, 0.4 mg at 01/30/20 7635   Warfarin MD/NP dosing, , Other, PRN, Mounika Altman MD 
  sodium chloride (NS) flush 5-40 mL, 5-40 mL, IntraVENous, Q8H, Linda Rios MD, 10 mL at 01/30/20 1858   sodium chloride (NS) flush 5-40 mL, 5-40 mL, IntraVENous, PRN, Linda Rios MD, 10 mL at 01/20/20 1326   acetaminophen (TYLENOL) tablet 650 mg, 650 mg, Oral, Q6H PRN, Mamie Rojas MD, 650 mg at 01/21/20 1512 
  naloxone [facility]) injection 0.4 mg, 0.4 mg, IntraVENous, PRN, Mamie Rojas MD 
  ondansetron [facility]) injection 4 mg, 4 mg, IntraVENous, Q4H PRN, Mamie Rojas MD, 4 mg at 01/24/20 0611 
  sucralfate (CARAFATE) tablet 1 g, 1 g, Oral, AC&HS, Mamie Rojas MD, 1 g at 01/30/20 7245   influenza vaccine 2019-20 (6 mos+)(PF) (FLUARIX/FLULAVAL/FLUZONE QUAD) injection 0.5 mL, 0.5 mL, IntraMUSCular, PRIOR TO DISCHARGE, Rosita Altman MD 
 
Thank you for allowing me to participate in this patient's care. Lorene Lisa AGACNP-BC [facility] [address] [place] [address], Suite [facility] Phone: [phone] Fax: [phone] [facility] ATTENDING ADDENDUM Patient was seen and examined in person. Data and notes were reviewed. I have discussed and agree with the plan as noted in the NP note above without further additions. Sonny Barker MD PhD 
[facility] [address] [place] [address]

## 2020-01-30 NOTE — PROGRESS NOTES
[facility] VAD Coordinator RN Caregiver Tino Mosley) was trained on the operation and care of the [facility] MED CTR LVAD system and its components prior to discharge as described in the Patient Handbook and Instructions for use. She is compliant with required training and competent to care for the device at home including  exchange and dressing change. Patient (Annie Villalpando) has been trained multiple time on the operation and care of the [facility] CTR LVAD system and its components. Annie Villalpando has inconsistent retention of LVAD training. He is being discharge to rehab where his education will continue. The following equipment was provided to patient and recorded in the [facility] equipment log via \"[employer]\" prior to hospital discharge: 
 
[address] [place] Battery Charger Total of EIGHT 14v Lithium Ion batteries Total of FOUR Battery clips 2 Holster Vests 2 Shower bags 1 Travel Bag Supplies for at least 4 dressing changes Rowena Noel RN

## 2020-01-30 NOTE — PROGRESS NOTES
1030hrs:  Dr. Stu Lynn at bedside. Per CM patient is being discharged today to inpatient rehab. MD notified that 24hr urine would not be able to be completed due to end time being 1745hrs today. She would notify nephrology when she followed up with them. 1140hrs:  RN at bedside assisting wife with discharge preparation. LVAD equipment packed in box. Wife is handling packing CPAP equipment. 1350hrs:  RN called report to  [phone], left message--RN at inpatient rehab will return call for report. 1450hrs:  TRANSFER - OUT REPORT: 
Verbal report given to Dai Manningdeysienzo (name) ([phone]) on Adine Stands  being transferred to [facility] inpatient rehab (unit) for routine progression of care Report consisted of patients Situation, Background, Assessment and  
Recommendations(SBAR). Information from the following report(s) SBAR, Intake/Output, MAR, Recent Results, Med Rec Status and Cardiac Rhythm A. Flutter/Paced 's was reviewed with the receiving nurse. Lines: PICC Triple Lumen 72/34/75 Right;Basilic (Active) Criteria for Appropriate Use Limited/no vessel suitable for conventional peripheral access 1/29/2020  8:00 PM  
Site Assessment Clean, dry, & intact 1/29/2020  8:00 PM  
Phlebitis Assessment 0 1/29/2020  8:00 PM  
Infiltration Assessment 0 1/29/2020  8:00 PM  
Arm Circumference (cm) 27 cm 11/22/2019  3:05 PM  
Date of Last Dressing Change 01/24/20 1/29/2020  8:45 AM  
Dressing Status Clean, dry, & intact 1/29/2020  8:00 PM  
External Catheter Length (cm) 2 centimeters 1/24/2020 11:05 AM  
Dressing Type Disk with Chlorhexadine gluconate (CHG) 1/29/2020  8:00 PM  
Action Taken Open ports on tubing capped 1/29/2020  8:00 PM  
Hub Color/Line Status Gray;Flushed;Patent;Capped 1/30/2020 11:30 AM  
Positive Blood Return (Site #1) Yes 1/30/2020 11:30 AM  
Hub Color/Line Status White;Flushed;Patent;Capped 1/30/2020 11:30 AM  
Positive Blood Return (Site #2) Yes 1/30/2020 11:30 AM  
 Hub Color/Line Status Red;Flushed;Patent;Capped 1/30/2020 11:30 AM  
Positive Blood Return (Site #3) Yes 1/30/2020 11:30 AM  
Alcohol Cap Used Yes 1/30/2020 11:30 AM  
Opportunity for questions and clarification was provided. Patient transported with: 
 Monitor O2 @ 2 liters Registered Nurse Tech

## 2020-02-04 NOTE — PROGRESS NOTES
18 - Report received from [facility] on Mr. Evonne Carrel. He is in bed, sleeping, awakens easily to verbal stimuli, disoriented to time, has sitter in the room. TF infusing, MAC, [place], Dobhoff in place, LVAD in place, neal patent. Milrinone 0.1 mcg/kg/min 7333 - Dr. Thierno Jose, nephrology, on to see patient. No changes to current plan of care from Nephrology standpoint. 24[address] [facility] and Dr. Rosalinda Kim, heart failure team in to see patient. Plan to pause Milrinone, watch hemodynamics and possibly dc PA catheter today. Milrinone on pause. 1230 - Bedside shift change report given to [address] [place] [address] (oncoming nurse) by Bernice WILKES (offgoing nurse). Report included the following information SBAR, Kardex, OR Summary, Intake/Output, MAR, Accordion and Cardiac Rhythm Paced. Chart reviewed. NGT trial clamping. Enema today. PT/OT when appropriate to determine Kelleu  needs. CM following.  Margie Paula RN

## 2020-02-06 NOTE — PROGRESS NOTES
Notified by [facility] rehab home health coordinator, that Sona's discharge date from [facility] rehab will be 2/18/20 with recommendations for home health skilled nursing, PT, OT & speech.  notified [facility] and requested home health orders with start of care date of 2/19/2020. Will await orders and fax to [facility]; [facility] is following. Also requested PSR please schedule Sona's follow up appointment with [facility].     Annmarie Valentino, MSW, LCSW    Clinical    [facility]

## 2020-02-07 NOTE — PROGRESS NOTES
Patient remains at [facility] UNIT. Patient estimated discharge date is 2/18/19.

## 2020-02-11 NOTE — PROGRESS NOTES
Patient's home health orders faxed to [facility] on this date.     Ferny Martino, MSW, LCSW    Clinical    [facility]

## 2020-02-14 NOTE — TELEPHONE ENCOUNTER
Returned call to patients wife to schedule a follow up appointment. Patient is scheduled on Monday February 24th w/Dr. Zhao Clark. Informed Mrs. Kiser that if home health feels he needs to be seen sooner to please call us.

## 2020-02-14 NOTE — PROGRESS NOTES
Patient currently remain in Inpatient Rehab ([facility]). Transition of care outreach postponed for 5 days.

## 2020-02-19 NOTE — Clinical Note
Dr. Shruti Suarez patient has Hospital f/u-[facility] scheduled for 2/24/20Berta and Diane Garcia- Patient is a bundle, I will sign off

## 2020-02-19 NOTE — PROGRESS NOTES
[address] [place] [address] Dr Discharge Follow-Up Date/Time:  2020 10:47 AM 
 
Patient was admitted to [facility] and discharged to [facility] inpatient rehab on 20. The patients discharge diagnosis was CHF. Patient was discharge on 20 from [facility] inpatient rehab. The discharge summary from the post acute facilty was not available at the time of outreach. Patient was contacted within 1 business days of discharge from the post acute facility. LPN Care Coordinator contacted the patient by telephone to perform post hospital discharge follow up. Verified name and  with patient as identifiers. Provided introduction to self, and explanation of the LPN Care Coordinator role. Patient received post acute facility discharge instructions. LPN Care Coordinator reviewed discharge instructions and red flags with patient who verbalized understanding. Patient given an opportunity to ask questions and does not have any further questions or concerns at this time. The patient agrees to contact the PCP office for questions related to their healthcare. LPN Care Coordinator provided contact information for future reference. Advance Care Planning:  
Does patient have an Advance Directive:  reviewed and current Home Health orders at discharge: PT, OT, SN Home Health company: [facility] [employer] Date of initial visit: 20 Durable Medical Equipment ordered at discharge: LVAD [address]: NA 
Durable Medical Equipment received: NA Medication(s):  
The post acute facility medication discharge list was not available for this call. [facility] follow up appointment(s):  
Future Appointments Date Time Provider Claudette Ramsay 2020  3:00 PM Rosita Altman MD [facility] SCHED [facility] follow up appointment(s):[facility]  information provided as a resource

## 2020-02-20 NOTE — TELEPHONE ENCOUNTER
Called patient to reschedule his appointment on Monday February 24th to the afternoon due to a scheduling conflict.     Patient is in agreement to move appointment to the afternoon

## 2020-02-24 PROBLEM — Z79.01 CHRONIC ANTICOAGULATION: Status: ACTIVE | Noted: 2020-01-01

## 2020-02-24 PROBLEM — Z95.811 LVAD (LEFT VENTRICULAR ASSIST DEVICE) PRESENT (HCC): Status: ACTIVE | Noted: 2020-01-01

## 2020-02-24 NOTE — PROGRESS NOTES
Leeann Note DOS:  2/24/2020 REFERRING PROVIDER:  Rober Yadav MD 
PRIMARY CARE PHYSICIAN: Alvarez Brandon MD 
PRIMARY CARDIOLOGIST: Rober Yadav MD 
 
 
 
Chief Complaint Patient presents with  Follow-up LVAD  
 
 
 
HPI: Corby David is a 71y.o. year old pleasant white male with a history of HTN, HLD, JOSE, CAD s/p cardiac arrest VFib s/p CABG (2011) c/b sternal would infection and sternectomy, ischemic cardiomyopathy LVEF 15-20%, s/p ICD and with LBBB. He was admitted with acute on chronic systolic heart failure with massive volume overload > 20 lbs, in the setting of atrial fibrillation s/p failed DCCV and underwent DCCV and RHC on 6/18. S/p BiVICD on 6/21/19 with Dr. Pennie Noriega. He was discharged home home on IV milrinone on 6/26/19. He has been followed closely by Dr. Jhonatan Meyers and the [facility]. Mr. Jamarcus Harris returns to clinic for follow up with his wife. His dyspnea has progressed to the point he is sitting in a chair most of the day. He is unable to perform simple ADLs without limiting dyspnea. His appetite is down. He underwent a sleep study and is using CPAP but has difficulty due to frequent nocturia. He denies presyncope or syncope. He has had a few falls due to generalized weakness but did not hit his head nor lose consciousness. Recent labs reveal stable creatinine from 1.9 - 2.1. He is currently taking torsemide 60 mg bid. His wife has noticed an odor to his urine. He denies fever/chills but admits to feeling cold all the time. IMPRESSION/PLAN: 
1. Acute on chronic systolic heart failure 
 ICM, Stage D, NYHA Class IV, LVEF 16-20%, s/p CRT on 6/21/19 CRT non-responder Intolerant to RAASi d/t renal dysfunction Reduce carvedilol to 3.125 mg BID d/t RV failure CardioMems with PAD 20-25 mmHg, 23 mmHg today Reduce IV milrinone to 0.375 mcgs/kg/min Admit to [facility] for  IV bumex infusion 1 mg/hour Continue spironolactone Follow renal function and electrolytes closely Start LVAD evaluation LVAD education Transfer to the ICU for PA cath when bed available 2. History of VF arrest - s/p AICD No recent shocks 3. CAD s/p CABG in 2010 Recommend [facility] 4. PAF s/p DCCV 6/18/19 Hold eliquis Start IV heparin for CVA prophylaxis Continue amiodarone 5. Chronic Kidney Disease 3 - single kidney Followed closely by Dr. Mcmillan Truong Consult nephrology 6. History of Urinary Retention UA and urine culture Bladder scan 7. JOSE on CPAP Encourage use of home CPAP 
 
8. History of Sternal wound infection requiring sternectomy Stable CV surgery aware - not a contraindication for LVAD 9. Anemia, iron deficiency Check iron profile 10. H/o DVT IV heparin 11. Migraines 12. H/o tobacco abuse Counseling - continued abstinence 13. Depression On lexapro - consider increasing his dose 14. Anorexia - likely multifactorial (depression, congestive hepatopathy) Check prealbumin Nutritional supplements Start [facility] CARDIAC EVALUATION 
ECHO (5/31/19) LVEF 21-25%, severely dilated LA, moderately dilated RA 
ECHO (6/24/19) LVEF 16-20%, Severely dilated LV, trace MR, trace TR 
 
EKG (6/12/19) atrial flutter with occasional PVCs, LBBB QRS 158ms EKG (6/26/19) Atrial fibrillation with premature ventricular or aberrantly conducted complexes, Left bundle branch block, rate 86, , QTc [address] [facility] not in epic Review of Systems:    
General:  Denies fever, fatigue HEENT: Denies angioedema, epistaxis; complains of hoarseness Pulmonary: Denies cough, wheeze, hemoptysis Cardiac: Admits to DOUGLAS, edema, orthopnea,  but denies exertional chest pain, palpitations, syncope, near syncope, bleeding, claudication, AICD firing GI:  Admits to abdominal bloating, anorexia but denies change in bowel habits, or black or bloody stools :  Admits to nocturia Musculo: Denies myalgias, arthralgias, + BLE edema Neuro: Denies headaches, CVA/TIA sx Skin:  Denies rash Heme: Denies bruising, bleeding, lymphadenopathy Psych: Admits to depression Physical Exam:  
 
Visit Vitals BP (!) 94/0 (BP 1 Location: Right arm, BP Patient Position: Sitting) Pulse (!) 107 Temp 98 °F (36.7 °C) (Oral) Resp 20 Ht 6' 2\" (1.88 m) Wt 187 lb (84.8 kg) SpO2 98% BMI 24.01 kg/m² General:  AAOx3 cooperative, mild distress. HEENT:  Atraumatic. Pink and moist.  Anicteric sclerae. Neck:   Supple, no adenopoathy, elevated JVP, +HJR Lungs:  Diminished bilateral breath sounds at the bases, No wheezing/rhonchi/rales. Heart:   Median sternotomy scar, sternectomy, AICD in left upper chest wall: Regular rhythm, S1, S2 present, 2/6 murmur, no rubs, S3 gallop. No carotid bruits. Abdomen:  Distended, non-tender. + Bowel sounds. No bruits. Extremities:  +3 bilateral pitting LE edema, no clubbing, no cyanosis. No calf tenderness Neurologic:  Grossly intact. Alert and oriented X 3. No acute neurological distress. Skin:   intact, no wounds, scattered ecchymosis and bruising, no rashes Psych:  Good insight. Depressed History: 
Past Medical History:  
Diagnosis Date  Degenerative disc disease, lumbar  Heart failure ([address]  High cholesterol  Hypertension  Paroxysmal atrial fibrillation ([address] 4/2/2019  Spinal stenosis Past Surgical History:  
Procedure Laterality Date  COLONOSCOPY Left 11/11/2019 COLONOSCOPY at bedside performed by Parris Burch MD at [address]  HX CORONARY ARTERY BYPASS GRAFT    
 triple  HX HEART ASSIST DEVICE Left 10/29/2019 Impella 5.0  
 HX HEART ASSIST DEVICE Left 11/18/2019 HeartMate 3  
 HX HERNIA REPAIR    
 HX IMPLANTABLE CARDIOVERTER DEFIBRILLATOR  HX THROMBECTOMY Left 11/25/2019 Left brachial thrombectomy  NC CARDIOVERSION ELECTIVE ARRHYTHMIA EXTERNAL N/A 6/10/2019 EP CARDIOVERSION performed by Za Iraheta MD at [address], [facility] Dr CATH LAB  NC CARDIOVERSION ELECTIVE ARRHYTHMIA EXTERNAL N/A 2019 EP CARDIOVERSION performed by Dipesh Villalba MD at [address], [facility] Dr CATH LAB  NC INSJ/RPLCMT PERM DFB W/TRNSVNS LDS 1/DUAL CHMBR N/A 2019 INSERT ICD BIV MULTI performed by Dipesh Segovia MD at [address], [facility] Dr CATH LAB  NC TCAT IMPL WRLS P-ART PRS SNR L-T HEMODYN MNTR N/A 2019 IMPLANT HEART FAILURE MONITORING DEVICE performed by Eder Murphy MD at [address], [facility]  CATH LAB Social History Socioeconomic History  Marital status:  Spouse name: Not on file  Number of children: Not on file  Years of education: Not on file  Highest education level: Not on file Occupational History  Not on file Social Needs  Financial resource strain: Not on file  Food insecurity:  
  Worry: Not on file Inability: Not on file  Transportation needs:  
  Medical: Not on file Non-medical: Not on file Tobacco Use  Smoking status: Former Smoker Last attempt to quit: 2010 Years since quittin.2  Smokeless tobacco: Never Used Substance and Sexual Activity  Alcohol use: Not Currently Comment: rarely  Drug use: Never  Sexual activity: Not on file Lifestyle  Physical activity:  
  Days per week: Not on file Minutes per session: Not on file  Stress: Not on file Relationships  Social connections:  
  Talks on phone: Not on file Gets together: Not on file Attends Church service: Not on file Active member of club or organization: Not on file Attends meetings of clubs or organizations: Not on file Relationship status: Not on file  Intimate partner violence:  
  Fear of current or ex partner: Not on file Emotionally abused: Not on file Physically abused: Not on file Forced sexual activity: Not on file Other Topics Concern [address] Service Not Asked  Blood Transfusions Not Asked  Caffeine Concern Not Asked  Occupational Exposure Not Asked Williemae Red Hazards Not Asked  Sleep Concern Not Asked  Stress Concern Not Asked  Weight Concern Not Asked  Special Diet Not Asked  Back Care Not Asked  Exercise Not Asked  Bike Helmet Not Asked  Seat Belt Not Asked  Self-Exams Not Asked Social History Narrative  Not on file Family History Problem Relation Age of Onset  Lung Disease Mother  Hypertension Mother Fadi Thomas Arthritis-osteo Mother  Heart Disease Mother  Heart Disease Father  Diabetes Father Current Medications:  
Current Outpatient Medications Medication Sig Dispense Refill  warfarin (COUMADIN) 3 mg tablet Take 3 mg by mouth daily.  hydrALAZINE (APRESOLINE) 10 mg tablet Take  by mouth every eight (8) hours.  ciprofloxacin HCl (CIPRO) 500 mg tablet Take  by mouth two (2) times a day.  atorvastatin (LIPITOR) 40 mg tablet Take  by mouth daily.  albuterol sulfate (PROVENTIL;VENTOLIN) 2.5 mg/0.5 mL nebu nebulizer solution 2.5 mg by Nebulization route every six (6) hours as needed for Wheezing.  albuterol-ipratropium (DUO-NEB) 2.5 mg-0.5 mg/3 ml nebu 3 mL by Nebulization route three (3) times daily. 30 Nebule 0  
 budesonide (PULMICORT) 0.5 mg/2 mL nbsp 2 mL by Nebulization route two (2) times a day. 999 Each 0  
 cholecalciferol (VITAMIN D3) (1000 Units /25 mcg) tablet Take 2 Tabs by mouth daily. 60 Tab 2  clonazePAM (KLONOPIN) 0.5 mg tablet Take 0.5 Tabs by mouth three (3) times daily. Max Daily Amount: 0.75 mg. 90 Tab 2  
 finasteride (PROSCAR) 5 mg tablet Take 1 Tab by mouth daily. 30 Tab 2  
 L. acidoph & paracasei- S therm- Bifido (TIAN-Q/RISAQUAD) 8 billion cell cap cap Take 1 Cap by mouth daily.  30 Cap 2  
  levETIRAcetam (KEPPRA) 250 mg tablet Take 1 Tab by mouth two (2) times a day. 60 Tab 2  
 magnesium oxide (MAG-OX) 400 mg tablet Take 2 Tabs by mouth two (2) times a day. 60 Tab 2  
 pantoprazole (PROTONIX) 40 mg tablet Take 1 Tab by mouth Daily (before breakfast). 30 Tab 2  potassium chloride SR (KLOR-CON 10) 10 mEq tablet Take 1 Tab by mouth daily. 30 Tab 2  
 sucralfate (CARAFATE) 1 gram tablet Take 1 Tab by mouth Before breakfast, lunch, dinner and at bedtime. 120 Tab 2  therapeutic multivitamin (THERAGRAN) tablet Take 1 Tab by mouth daily. 30 Tab 2  
 venlafaxine-SR (EFFEXOR-XR) 150 mg capsule Take 1 Cap by mouth daily (with breakfast). 30 Cap 2  
 tamsulosin (FLOMAX) 0.4 mg capsule Take 0.4 mg by mouth daily. 3  
 aspirin delayed-release 81 mg tablet Take 81 mg by mouth daily. Allergies: Allergies Allergen Reactions  Cefepime Other (comments)  
  myoclonus Recent Labs:  
Labs Latest Ref Rng & Units 1/30/2020 1/29/2020 1/28/2020 1/27/2020 1/26/2020 1/24/2020 1/22/2020 WBC 4.1 - 11.1 K/uL 3.6(L) 3. 9(L) 3. 9(L) 4.5 3.4(L) 3. 9(L) 4.2  
RBC 4.10 - 5.70 M/uL 3.24(L) 3.37(L) 3.44(L) 3.50(L) 3.34(L) 3.30(L) 3.35(L) Hemoglobin 12.1 - 17.0 g/dL 9.5(L) 10. 1(L) 10. 1(L) 10. 3(L) 9.8(L) 9.7(L) 9.8(L) Hematocrit 36.6 - 50.3 % 32. 0(L) 33. 0(L) 33. 8(L) 34. 5(L) 33. 1(L) 32. 5(L) 33. 0(L) MCV 80.0 - 99.0 FL 98.8 97.9 98.3 98.6 99. 1(H) 98.5 98.5 Platelets 339 - 949 K/uL 104(L) 129(L) 136(L) 144(L) 136(L) 124(L) 109(L) Lymphocytes 12 - 49 % - - - - - - - Monocytes 5 - 13 % - - - - - - - Eosinophils 0 - 7 % - - - - - - - Basophils 0 - 1 % - - - - - - - Bands 0 - 6 % - - - - - - - Blasts 0 % - - - - - - - Albumin 3.5 - 5.0 g/dL 2. 7(L) 2. 8(L) 2. 7(L) 2. 8(L) 2. 6(L) 2. 3(L) 2. 2(L) Calcium 8.5 - 10.1 MG/DL 8.7 8.6 8.7 8.7 8.7 8.7 9.0 SGOT 15 - 37 U/L 10(L) 12(L) 14(L) 15 15 14(L) 18 Glucose 65 - 100 mg/dL 82 99 102(H) 83 94 91 88 BUN 6 - 20 MG/DL 17 18 18 17 18 18 23(H) Creatinine 0.70 - 1.30 MG/DL 0.98 1.04 0.99 1.05 1.03 0.94 1.06 Sodium 136 - 145 mmol/L 138 138 136 137 140 138 137 Potassium 3.5 - 5.1 mmol/L 3.8 4.3 4.0 4.6 4.3 4.4 4.9 LDH 85 - 241 U/L 166 - 175 - 174 170 204 CEA ng/mL - - - - 5.4 - - Some recent data might be hidden Lab Results Component Value Date/Time WBC 3.6 (L) 01/30/2020 04:08 AM  
 HGB 9.5 (L) 01/30/2020 04:08 AM  
 HCT 32.0 (L) 01/30/2020 04:08 AM  
 PLATELET 054 (L) 51/96/8957 04:08 AM  
 MCV 98.8 01/30/2020 04:08 AM  
 
Lab Results Component Value Date/Time GFR est non-AA >60 01/30/2020 04:08 AM  
 GFR est AA >60 01/30/2020 04:08 AM  
 Creatinine 0.98 01/30/2020 04:08 AM  
 BUN 17 01/30/2020 04:08 AM  
 Sodium 138 01/30/2020 04:08 AM  
 Potassium 3.8 01/30/2020 04:08 AM  
 Chloride 104 01/30/2020 04:08 AM  
 CO2 30 01/30/2020 04:08 AM  
 Magnesium 2.1 01/30/2020 04:08 AM  
 Phosphorus 3.3 11/27/2019 04:18 AM  
 
Lab Results Component Value Date/Time TSH 2.30 12/03/2019 03:29 AM  
 T4, Free 1.5 12/03/2019 03:29 AM  
  
Lab Results Component Value Date/Time Sodium 138 01/30/2020 04:08 AM  
 Potassium 3.8 01/30/2020 04:08 AM  
 Chloride 104 01/30/2020 04:08 AM  
 CO2 30 01/30/2020 04:08 AM  
 Anion gap 4 (L) 01/30/2020 04:08 AM  
 Glucose 82 01/30/2020 04:08 AM  
 BUN 17 01/30/2020 04:08 AM  
 Creatinine 0.98 01/30/2020 04:08 AM  
 BUN/Creatinine ratio 17 01/30/2020 04:08 AM  
 GFR est AA >60 01/30/2020 04:08 AM  
 GFR est non-AA >60 01/30/2020 04:08 AM  
 Calcium 8.7 01/30/2020 04:08 AM  
 Bilirubin, total 0.7 01/30/2020 04:08 AM  
 ALT (SGPT) 10 (L) 01/30/2020 04:08 AM  
 AST (SGOT) 10 (L) 01/30/2020 04:08 AM  
 Alk. phosphatase 100 01/30/2020 04:08 AM  
 Protein, total 6.0 (L) 01/30/2020 04:08 AM  
 Albumin 2.7 (L) 01/30/2020 04:08 AM  
 Globulin 3.3 01/30/2020 04:08 AM  
 A-G Ratio 0.8 (L) 01/30/2020 04:08 AM  
  
Lab Results Component Value Date/Time  Hemoglobin A1c 6.6 (H) 10/25/2019 02:56 PM  
  
 
 
 
 Thank you for letting us see him with you, Mounika TIMMONS [facility] Ornel[facility] MD, Jewel Mike Chief of Cardiology, [facility] Medical Director Calos Rueda [phone], [address] Office [phone] Fax [phone]

## 2020-02-24 NOTE — PROGRESS NOTES
[address] [facility] [place] [address] [facility] [place] in [facility], [facility] Inpatient Progress Note Patient name: Meg Vera Patient : 1950 Patient MRN: [id number] Attending MD: Leti att. providers found Date of service: 20 CHIEF COMPLAINT: 
De azucena LVAD implant 
  
  
IMPRESSION/PLAN: 
1. ICM - Stage D,  LVEF 15%, NYHA Class IV improved to NYHA Class III s/p HM3 LVAD as DT on 19 with Impella 5.0 as bridge to LVAD Continue LVAD speed at 5800 rpm 
 Intolerant to BB d/t RV failure Continue hydralazine 10 mg TID 
 RAASi held in the hospital d/t JUAN J on CKD Re-challenge with entresto 24/26 mg, 0.5 tablet twice daily Hold torsemide Continue warfarin, goal INR 1.5-2 (reduced INR d/t hematuria) Check CMP, PT INR, CBC, NT-proBNP today Dr. Shelley Sandoval would like to pull sternal wire once stable ~3 months from implant () Follow up in the LVAD clinic in 1 week 2. VT - s/p AICD 3. COPD Does not have a nebulizer machine at home Denies wheezing, cough, dyspnea at rest 
 Follow up with pulmonary Albuterol MRI prn 
 
4. JOSE Encouraged patient to be compliant with CPAP therapy Follow up with Dr. Chadwick Cooper 5. Diverticulosis Discontinue PPI, carafate Monitor for GI bleeding 5. Recurrent UTI On cipro x 6-12 months, possibly for life 6. Nutrition Check prealbumin 7. Tremors On keppra 250 mg BID Continue clonazepam 0.5 mg BID Significant relief from klonopin - plan to wean keppra once symptoms improved with klonopin 8. BPH On finasteride and tamsulosin 9. Depression On Effexor - mg daily 10. Persistently elevated CEA Persistently elevated CEA; PET scan shows increased activity RML of lung. Oncology consult  appreciated - PET not suggestive of malignancy 11. Orthostatic Hypotension Improved Encourage use of [employer] 12. PAF Off digoxin d/t tremors Intolerant of BB d/t RV failure On warfarin HPI: Christopher Kiser is a 71y.o. 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 He was transferred to [facility] on 1/30 and discharged from rehab on 2/18/2020. Marylen Hollow returns to clinic today for transition of care following his rehab. He was discharged with nebulizer medications but does not have a nebulizer. He denies wheezing, cough, and develops dyspnea only with exertion.  
 
   
CARDIAC IMAGING: 
Chest CTA- no outflow graft occlusion Pet Scan equivocal for amyloid Send [employer]- pending  
  
OTHER IMAGING: 
Head CT negative for acute process EEG suggestive of mild generalized encephalopathic process, possibly related to underlying structural brain injury vs metabolic abnormality LVAD INTERROGATION: 
Device interrogated in person Device function normal, normal flow, no events LVAD Pump Speed (RPM): 5800 Pump Flow (LPM): 5.1 PI (Pulsitility Index): 3.3 Power: 4.5 Back Up  at Bedside & Labeled: Yes Driveline Site Care: Yes Driveline Dressing: Changed per order Outpatient: Yes Testing Alarms Reviewed: Yes 
Back up SC speed: 5800 Back up Low Speed Limit: 5400 Emergency Equipment with Patient?: Yes Emergency procedures reviewed?: Yes Drive line site inspected?: Yes Drive line intergrity inspected?: Yes Drive line dressing changed?: Yes PHYSICAL EXAM: 
Visit Vitals BP (!) 94/0 (BP 1 Location: Right arm, BP Patient Position: Sitting) Pulse (!) 107 Temp 98 °F (36.7 °C) (Oral) Resp 20 Ht 6' 2\" (1.88 m) Wt 187 lb (84.8 kg) SpO2 98% BMI 24.01 kg/m² General: Patient is well developed, well-nourished in no acute distress HEENT: Normocephalic and atraumatic. No scleral icterus. Pupils are equal, round and reactive to light and accomodation. No conjunctival injection. Oropharynx is clear. Flushed. Neck: Supple. No evidence of thyroid enlargements or lymphadenopathy. JVD: Cannot be appreciated Lungs: Breath sounds are equal and clear bilaterally. No wheezes, rhonchi, or rales. Heart: Regular rate and rhythm with normal S1 and S2. No murmurs, gallops or rubs. +LVAD hum. Abdomen: Soft, no mass or tenderness. No organomegaly or hernia. Bowel sounds present. Genitourinary and rectal: deferred Extremities: No cyanosis, clubbing. 1+ peripheral edema. Neurologic: No focal sensory or motor deficits are noted. Grossly intact. Psychiatric: Awake, alert an doriented x 3. Appropriate mood and affect. Skin: Warm, dry and well perfused. No lesions, nodules or rashes are noted. REVIEW OF SYSTEMS: 
General: Denies fever, night sweats. Ear, nose and throat: Denies difficulty hearing, sinus problems, runny nose, post-nasal drip, ringing in ears, mouth sores, loose teeth, ear pain, nosebleeds, sore throat, facial pain or numbess Cardiovascular: see above in the interval history Respiratory: Denies cough, wheezing, sputum production, hemoptysis. Gastrointestinal: Denies heartburn, constipation, intolerance to certain foods, diarrhea, abdominal pain, nausea, vomiting, difficulty swallowing, blood in stool Kidney and bladder: Denies painful urination, frequent urination, urgency, prostate problems and impotence Musculoskeletal: Denies joint pain, muscle weakness Skin and hair: Denies change in existing skin lesions, hair loss or increase, breast changes PAST MEDICAL HISTORY: 
Past Medical History:  
Diagnosis Date  Degenerative disc disease, lumbar  Heart failure ([address]  High cholesterol  Hypertension  Paroxysmal atrial fibrillation [address] [address] 2019  Spinal stenosis PAST SURGICAL HISTORY: 
Past Surgical History:  
Procedure Laterality Date  COLONOSCOPY Left 2019 COLONOSCOPY at bedside performed by Tisha Hood MD at [facility]  HX CORONARY ARTERY BYPASS GRAFT    
 triple  HX HEART ASSIST DEVICE Left 10/29/2019 Impella 5.0  
 HX HEART ASSIST DEVICE Left 2019 HeartMate 3  
 HX HERNIA REPAIR    
 HX IMPLANTABLE CARDIOVERTER DEFIBRILLATOR  HX THROMBECTOMY Left 2019 Left brachial thrombectomy  ND CARDIOVERSION ELECTIVE ARRHYTHMIA EXTERNAL N/A 6/10/2019 EP CARDIOVERSION performed by Albin Pichardo MD at [place] [address], [facility] [address] CATH LAB  ND CARDIOVERSION ELECTIVE ARRHYTHMIA EXTERNAL N/A 2019 EP CARDIOVERSION performed by Analisa Singh MD at [place] [address], [facility] [address] CATH LAB  ND INSJ/RPLCMT PERM DFB W/TRNSVNS LDS 1/DUAL CHMBR N/A 2019 INSERT ICD BIV MULTI performed by Simin Fierro MD at [place] [address], [facility] [address] CATH LAB  ND TCAT IMPL WRLS P-ART PRS SNR L-T HEMODYN MNTR N/A 2019 IMPLANT HEART FAILURE MONITORING DEVICE performed by Sudha Spears MD at [place] [address], [facility] [address] CATH LAB FAMILY HISTORY: 
Family History Problem Relation Age of Onset  Lung Disease Mother  Hypertension Mother [facility] Arthritis-osteo Mother  Heart Disease Mother  Heart Disease Father  Diabetes Father SOCIAL HISTORY: 
Social History Socioeconomic History  Marital status:  Spouse name: Not on file  Number of children: Not on file  Years of education: Not on file  Highest education level: Not on file Tobacco Use  Smoking status: Former Smoker Last attempt to quit: 2010 Years since quittin.2  Smokeless tobacco: Never Used Substance and Sexual Activity  Alcohol use: Not Currently Comment: rarely  Drug use: Never Other Topics Concern LABORATORY RESULTS: 
  
 Labs Latest Ref Rng & Units 1/30/2020 1/29/2020 1/28/2020 1/27/2020 1/26/2020 1/24/2020 1/22/2020 WBC 4.1 - 11.1 K/uL 3.6(L) 3. 9(L) 3. 9(L) 4.5 3.4(L) 3. 9(L) 4.2  
RBC 4.10 - 5.70 M/uL 3.24(L) 3.37(L) 3.44(L) 3.50(L) 3.34(L) 3.30(L) 3.35(L) Hemoglobin 12.1 - 17.0 g/dL 9.5(L) 10. 1(L) 10. 1(L) 10. 3(L) 9.8(L) 9.7(L) 9.8(L) Hematocrit 36.6 - 50.3 % 32. 0(L) 33. 0(L) 33. 8(L) 34. 5(L) 33. 1(L) 32. 5(L) 33. 0(L) MCV 80.0 - 99.0 FL 98.8 97.9 98.3 98.6 99. 1(H) 98.5 98.5 Platelets 381 - 123 K/uL 104(L) 129(L) 136(L) 144(L) 136(L) 124(L) 109(L) Lymphocytes 12 - 49 % - - - - - - - Monocytes 5 - 13 % - - - - - - - Eosinophils 0 - 7 % - - - - - - - Basophils 0 - 1 % - - - - - - - Bands 0 - 6 % - - - - - - - Blasts 0 % - - - - - - - Albumin 3.5 - 5.0 g/dL 2. 7(L) 2. 8(L) 2. 7(L) 2. 8(L) 2. 6(L) 2. 3(L) 2. 2(L) Calcium 8.5 - 10.1 MG/DL 8.7 8.6 8.7 8.7 8.7 8.7 9.0 SGOT 15 - 37 U/L 10(L) 12(L) 14(L) 15 15 14(L) 18 Glucose 65 - 100 mg/dL 82 99 102(H) 83 94 91 88 BUN 6 - 20 MG/DL 17 18 18 17 18 18 23(H) Creatinine 0.70 - 1.30 MG/DL 0.98 1.04 0.99 1.05 1.03 0.94 1.06 Sodium 136 - 145 mmol/L 138 138 136 137 140 138 137 Potassium 3.5 - 5.1 mmol/L 3.8 4.3 4.0 4.6 4.3 4.4 4.9 LDH 85 - 241 U/L 166 - 175 - 174 170 204 CEA ng/mL - - - - 5.4 - - Some recent data might be hidden Lab Results Component Value Date/Time TSH 2.30 12/03/2019 03:29 AM  
 TSH 2.40 10/25/2019 07:39 PM  
 TSH 2.45 06/01/2019 04:16 AM  
 
 
ALLERGY: 
Allergies Allergen Reactions  Cefepime Other (comments)  
  myoclonus CURRENT MEDICATIONS: 
 
Current Outpatient Medications:  
  warfarin (COUMADIN) 3 mg tablet, Take 3 mg by mouth daily. , Disp: , Rfl:  
  hydrALAZINE (APRESOLINE) 10 mg tablet, Take  by mouth every eight (8) hours. , Disp: , Rfl:  
  ciprofloxacin HCl (CIPRO) 500 mg tablet, Take  by mouth two (2) times a day., Disp: , Rfl:  
  atorvastatin (LIPITOR) 40 mg tablet, Take  by mouth daily. , Disp: , Rfl:  
   albuterol (PROVENTIL HFA, VENTOLIN HFA, PROAIR HFA) 90 mcg/actuation inhaler, Take 2 Puffs by inhalation every six (6) hours as needed for Wheezing., Disp: 1 Inhaler, Rfl: 3 
  sacubitril-valsartan (ENTRESTO) 24 mg/26 mg tablet, Take 1 Tab by mouth two (2) times a day., Disp: , Rfl:  
  potassium chloride SR (KLOR-CON 10) 10 mEq tablet, Take 1 Tab by mouth daily. Take 1/2 tablet daily, Disp: 30 Tab, Rfl: 2 
  albuterol-ipratropium (DUO-NEB) 2.5 mg-0.5 mg/3 ml nebu, 3 mL by Nebulization route three (3) times daily. , Disp: 30 Nebule, Rfl: 0 
  budesonide (PULMICORT) 0.5 mg/2 mL nbsp, 2 mL by Nebulization route two (2) times a day., Disp: 999 Each, Rfl: 0 
  cholecalciferol (VITAMIN D3) (1000 Units /25 mcg) tablet, Take 2 Tabs by mouth daily. , Disp: 60 Tab, Rfl: 2   clonazePAM (KLONOPIN) 0.5 mg tablet, Take 0.5 Tabs by mouth three (3) times daily. Max Daily Amount: 0.75 mg., Disp: 90 Tab, Rfl: 2 
  finasteride (PROSCAR) 5 mg tablet, Take 1 Tab by mouth daily. , Disp: 30 Tab, Rfl: 2 
  L. acidoph & paracasei- S therm- Bifido (TIAN-Q/RISAQUAD) 8 billion cell cap cap, Take 1 Cap by mouth daily. , Disp: 30 Cap, Rfl: 2 
  levETIRAcetam (KEPPRA) 250 mg tablet, Take 1 Tab by mouth two (2) times a day., Disp: 60 Tab, Rfl: 2 
  magnesium oxide (MAG-OX) 400 mg tablet, Take 2 Tabs by mouth two (2) times a day., Disp: 60 Tab, Rfl: 2 
  therapeutic multivitamin (THERAGRAN) tablet, Take 1 Tab by mouth daily. , Disp: 30 Tab, Rfl: 2 
  venlafaxine-SR (EFFEXOR-XR) 150 mg capsule, Take 1 Cap by mouth daily (with breakfast). , Disp: 30 Cap, Rfl: 2 
  tamsulosin (FLOMAX) 0.4 mg capsule, Take 0.4 mg by mouth daily. , Disp: , Rfl: 3 
  aspirin delayed-release 81 mg tablet, Take 81 mg by mouth daily. , Disp: , Rfl:  
 
 
Thank you for letting us see him with you, Mounika Willis MD, David Bhatt Chief of Cardiology, [facility] Medical Director [facility] [id number] [facility] [address] [facility], [address] Office [phone] Fax [phone]

## 2020-02-24 NOTE — PATIENT INSTRUCTIONS
Medication changes: 
Discontinue nebulizer medications. Start an Albuterol inhaler as needed for wheezing or shortness of breath. This may make your heart beat faster temporarily. Discontinue Carafate and Protonix. Use saline nasal spray from dry nasal  Passages. Use Afrin nasal spray - Spray on gauze pads and lightly pack in nose for nose bleeds. Resume Entresto 24/26 take 1 tablet two times daily. Please call us if you experience any new dizziness. Change Potassium to 1/2 tablet daily. Testing Ordered: 
 
Labs drawn in clinic. We will call with any abnormal results. Other Recommendations: Follow up with the Sleep Disorder Clinic (Dr. Romulo Zepeda - [phone]) Follow up with Urology (Dr. Beryle Cornelia - [phone]) Follow up with Nephrology (Dr. Berta Govea - [phone]) Ensure you are drinking an adequate amount of water. Your urine should be clear and light yellow straw colored. Follow up in 1 week with [facility] [phone] Please bring your daily sheets and medications to your next appointment. Please monitor your weights daily upon waking and after using the bathroom. Keep a written records of your weights and bring to your next appointment. If you have a weight gain of 3 or more pounds overnight OR 5 or more pounds in one week please contact our office. Thank you for allowing us the privilege of being a part of your healthcare team! Please do not hesitate to contact our office at [phone] with any questions or concerns.

## 2020-02-25 NOTE — TELEPHONE ENCOUNTER
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 Patient verbalized agreement to have free genetic counseling. Will complete appropriate paperwork. Further, reviewed medication changes made at yesterday's clinic appointment with Roma Siegel. Will have [facility] nurse review medication and will call [facility] to cancel discontinued medications. [phone] - called [facility] and discontinued all medications that Dr. Zeynep Silva has discontinued.

## 2020-02-25 NOTE — PROGRESS NOTES
Spoke with patient's wife. Nael Llanes verbalized understanding of Coumadin dosing and repeat INR in 1 week.

## 2020-02-26 NOTE — PROGRESS NOTES
Call to patient today, spoke with wife. She reports he is doing well, weak but trying to get stronger. Was in [facility] 10/25-1/30 and then [facility] 1/30-2/18. [facility], SN/PT/OT, have been seeing him since coming home. Using wheelchair but hoping to strengthen with help of PT and be able to walk with rollator. Admitted to [facility] for acute on chronic CHF. LVAD placed on 11/18/19. Hospitalization complicated by UTI with bacteremia- long term antibiotics needed per ID. He saw  on 2/24. PCP is Umm Felix but wife prefers to have  handle any medical issues for now. Out of service area for [employer] [facility]. Wife did not want to review meds with me at this time, said nurse at [facility] office and [facility] herself had reviewed them on Monday. Says she and  fill his med pillbox. Says has good support from family and Clerance Smoker. Goals  Patient verbalizes understanding of self-management goals of living with Congestive Heart Failure 2/26/20 [facility] 10/25-1/30,then [facility] 1/30-2/18. CAD/CHF/LVAD 11/18/19. · Reviewed discharge instructions with wife · Attempted to review meds with wife but she declined, stating when he saw  few days prior, had reviewed all meds with her. As well as with [facility] nurse. · Reviewed red flags: weight gain, swelling in extremities, sob,chest pain, weakness,falls, change in mentation,fever,nausea,vomiting,diarrhea. · Declined to schedule JESÚS with pcp, Dr.Francis Sharma. Prefers to see  and her team for any medical issues for now. · Out of service area for [employer] [facility]. · Education and teaching on LVAD. · Wife declined CTN contact info. · Advised wife will check back with her in 1-2 weeks for update. [email or web address]

## 2020-02-27 NOTE — TELEPHONE ENCOUNTER
Telephone Call RE:  Appointment reminder     Outcome:     [] Patient confirmed appointment   [] Patient rescheduled appointment for    [] Unable to reach   [x] Left message              [] Other:       Anselmo Ricardo Reminder to arrive 15 minutes early for check-in.   Flu symptoms question,

## 2020-02-28 NOTE — PROGRESS NOTES
Spoke with patient's wife. Nael Llanes verbalized understanding of Coumadin dosing and repeat INR on Monday 3/2/2020. Nael Llanes stated patient fell in the home today. He has a skin tear on his arm but otherwise is not injured. He had a visitor who happened to be a physician. She examined his wound. He had occupational therapy today. This is is 2nd fall at home, both occurring after PT/OT.

## 2020-03-02 NOTE — TELEPHONE ENCOUNTER
In office PT/INR done. INR 1.8  I reported this to Marcio Power RN for review and instructions to patient.

## 2020-03-02 NOTE — PROGRESS NOTES
+[facility] [address] [facility] in [place], [place]  Inpatient Progress Note      Patient name: Corby David  Patient : 1950  Patient MRN: [id number]  Attending MD: Leti att. providers found  Date of service: 20    CHIEF COMPLAINT:  Chief Complaint   Patient presents with    Follow-up     LVAD    Fall     20           IMPRESSION/PLAN:   ICM - Stage D,  LVEF 15%, NYHA Class IV improved to NYHA Class III s/p HM3 LVAD as DT on 19 with Impella 5.0 as bridge to LVAD   Continue LVAD speed at 5800 rpm   Intolerant to BB d/t RV failure   Continue hydralazine 10 mg TID   RAASi held in the hospital d/t JUAN J on CKD   Cont entresto 24/26 mg, 0.5 tablet twice daily   Hold torsemide   Continue warfarin, goal INR 1.5-2 (reduced INR d/t hematuria)   Labs as directed    Dr. Soraya Eckert would like to pull sternal wire once stable ~3 months from implant ()   Follow up in the LVAD clinic in 1 week   Repeat TTE in 1 month     VT - s/p AICD   No recent shocks   Follow up with EP as directed       COPD   Does not have a nebulizer machine at home   Denies wheezing, cough, dyspnea at rest   Follow up with pulmonary   Albuterol MRI prn    JOSE   Encouraged patient to be compliant with CPAP therapy   Follow up with Dr. Marina Ross PPI, carafate   Monitor for GI bleeding     Recurrent UTI   On cipro x 6-12 months, possibly for life    Nutrition   Check prealbumin    Tremors   On keppra 250 mg BID   Continue clonazepam 0.5 mg BID   Significant relief from klonopin - plan to wean keppra once symptoms improved with klonopin    BPH   On finasteride and tamsulosin   Follow up with urology     Depression   On Effexor - mg daily     Persistently elevated CEA   Persistently elevated CEA; PET scan shows increased activity RML of lung.     Oncology consult appreciated - PET not suggestive of malignancy     Orthostatic Hypotension   Improved   Encourage use of Shreya Hose  PAF   Off digoxin d/t tremors   Intolerant of BB d/t RV failure   On warfarin    LLE edema   Worse then right   Duplex today       HPI: Sona Kiser is a 71y.o. 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 He was transferred to [facility] rehab on 1/30 and discharged from rehab on 2/18/2020. Claudell Latino returns to clinic today for LVAD follow up, he has done well at home, sleeping better and his appetite has improved. He works with PT/OT at home, he has had 2 falls in the past week after these sessions but has not hit his head. He is trying to increase his activity, is compliant with his medications and is accompanied by his wife. CARDIAC IMAGING:  Chest CTA- no outflow graft occlusion   Pet Scan equivocal for amyloid  Send [employer]- pending     10/25/19   ECHO ADULT COMPLETE 01/29/2020 1/29/2020    Narrative · Severely dilated left ventricle. Upper normal wall thickness. Severe   systolic dysfunction. Estimated left ventricular ejection fraction is   <15%. Left ventricular diastolic dysfunction. · Mitral valve thickening. Minimal mitral valve prolapse of the anterior   mitral valve leaflet. Trace mitral valve regurgitation is present. · Mild tricuspid valve regurgitation is present. · Mildly dilated right ventricle. Moderately reduced RV systolic function   (TAPSE 1.3 cm, RV S' 6 cm/s). Pacer/ICD present. · Mildly biatrial enlargement  · Severely elevated central venous pressure (15+ mmHg); IVC diameter is   larger than 21 mm and collapses less than 50% with respiration.   · LVAD inflow cannula spectral Doppler tracings not obtained. Signed by: Yenny Cuevas MD          OTHER IMAGING:  Head CT negative for acute process  EEG suggestive of mild generalized encephalopathic process, possibly related to underlying structural brain injury vs metabolic abnormality    Visit Vitals  BP (!) 84/0 (BP 1 Location: Right arm, BP Patient Position: Sitting)   Pulse 60   Temp 98.2 °F (36.8 °C) (Oral)   Resp 20   Ht 6' 2\" (1.88 m)   Wt 189 lb (85.7 kg)   SpO2 95%   BMI 24.27 kg/m²        LVAD   Pump Speed (RPM): 5850  Pump Flow (LPM): 5.5  PI (Pulsitility Index): 2  Power: 4.6  Back Up  at Bedside & Labeled: Yes  Driveline Site Care: Yes  Driveline Dressing: Changed per order  Outpatient: Yes  MAP in Therapeutic Range (Outpatient): Yes  Testing  Alarms Reviewed: Yes  Back up SC speed: 5800  Back up Low Speed Limit: 5400  Emergency Equipment with Patient?: Yes  Emergency procedures reviewed?: Yes  Drive line site inspected?: Yes  Drive line intergrity inspected?: Yes  Drive line dressing changed?: Yes    Results for orders placed or performed in visit on 03/02/20   OK INTERROGATION VAD IN PRSON W/PHYS/QHP ANALYSIS    Narrative    Alarm history reviewed. Please see VAD flow sheet for readings. Few PI events but no adverse alarms noted. Settings reviewed, but no changes made. Review of Systems   Constitutional: Negative for chills and fever. HENT: Negative for hearing loss. Eyes: Negative for blurred vision. Respiratory: Negative for cough, hemoptysis and shortness of breath. Cardiovascular: Positive for leg swelling. Negative for chest pain, palpitations, orthopnea and PND. Gastrointestinal: Negative for heartburn, nausea and vomiting. Genitourinary: Negative. Musculoskeletal: Negative for myalgias. Skin:        L arm hematoma    Neurological: Positive for dizziness and weakness. Negative for headaches.         Intermittent dizziness when standing up too fast  Weakness after PT/OT sessions    Endo/Heme/Allergies: Bruises/bleeds easily. Psychiatric/Behavioral: Positive for depression. Physical Exam   Constitutional: He is oriented to person, place, and time. He appears well-developed and well-nourished. No distress. HENT:   Head: Normocephalic. Neck: Normal range of motion. Neck supple. No JVD present. Cardiovascular: Normal rate, regular rhythm and normal heart sounds. + VAD hum    Pulmonary/Chest: Effort normal and breath sounds normal. No respiratory distress. Abdominal: Soft. Bowel sounds are normal. He exhibits no distension. Musculoskeletal: Normal range of motion. General: Edema present. Neurological: He is alert and oriented to person, place, and time. Skin: Skin is warm and dry. L arm laceration and hematoma, general ecchymosis    Psychiatric: He has a normal mood and affect. Vitals reviewed.       PAST MEDICAL HISTORY:  Past Medical History:   Diagnosis Date    Degenerative disc disease, lumbar     Heart failure ([facility] [address]     High cholesterol     Hypertension     Paroxysmal atrial fibrillation ([facility] [address] 4/2/2019    Spinal stenosis        PAST SURGICAL HISTORY:  Past Surgical History:   Procedure Laterality Date    COLONOSCOPY Left 11/11/2019    COLONOSCOPY at bedside performed by Alexa Escudero MD at [address] HX APPENDECTOMY      [address] GRAFT      triple    HX HEART ASSIST DEVICE Left 10/29/2019    Impella 5.0    HX HEART ASSIST DEVICE Left 11/18/2019    HeartMate 3    HX HERNIA REPAIR      HX IMPLANTABLE CARDIOVERTER DEFIBRILLATOR      HX THROMBECTOMY Left 11/25/2019    Left brachial thrombectomy    NE CARDIOVERSION ELECTIVE ARRHYTHMIA EXTERNAL N/A 6/10/2019    EP CARDIOVERSION performed by Shiv Young MD at [address] [place] [address], [facility] [address] CATH LAB    NE CARDIOVERSION ELECTIVE ARRHYTHMIA EXTERNAL N/A 6/18/2019    EP CARDIOVERSION performed by Omkar Perez MD at [address] [place] [address], [facility] CATH LAB    NE INSJ/RPLCMT PERM DFB W/TRNSVNS LDS 1/DUAL CHMBR N/A 2019    INSERT ICD BIV MULTI performed by Jv Morris MD at [address] [facility]  CATH LAB    WI TCAT IMPL WRLS P-ART PRS SNR L-T HEMODYN MNTR N/A 2019    IMPLANT HEART FAILURE MONITORING DEVICE performed by Refugio Melissa MD at [address], [facility]  CATH LAB       FAMILY HISTORY:  Family History   Problem Relation Age of Onset    Lung Disease Mother     Hypertension Mother     Arthritis-osteo Mother     Heart Disease Mother     Heart Disease Father     Diabetes Father        SOCIAL HISTORY:  Social History     Socioeconomic History    Marital status:      Spouse name: Not on file    Number of children: Not on file    Years of education: Not on file    Highest education level: Not on file   Tobacco Use    Smoking status: Former Smoker     Last attempt to quit: 2010     Years since quittin.2    Smokeless tobacco: Never Used   Substance and Sexual Activity    Alcohol use: Not Currently     Comment: rarely    Drug use: Never   Other Topics Concern       LABORATORY RESULTS:     Labs Latest Ref Rng & Units 2020   WBC 4.1 - 11.1 K/uL 3.8(L) 3. 6(L) 3. 9(L) 3. 9(L) 4.5 3.4(L) 3. 9(L)   RBC 4.10 - 5.70 M/uL 3.64(L) 3.24(L) 3.37(L) 3.44(L) 3.50(L) 3.34(L) 3.30(L)   Hemoglobin 12.1 - 17.0 g/dL 10. 8(L) 9.5(L) 10. 1(L) 10. 1(L) 10. 3(L) 9.8(L) 9.7(L)   Hematocrit 36.6 - 50.3 % 36. 1(L) 32. 0(L) 33. 0(L) 33. 8(L) 34. 5(L) 33. 1(L) 32. 5(L)   MCV 80.0 - 99.0 FL 99. 2(H) 98.8 97.9 98.3 98.6 99. 1(H) 98.5   Platelets 511 - 974 K/uL 139(L) 104(L) 129(L) 136(L) 144(L) 136(L) 124(L)   Lymphocytes 12 - 49 % 18 - - - - - -   Monocytes 5 - 13 % 10 - - - - - -   Eosinophils 0 - 7 % 3 - - - - - -   Basophils 0 - 1 % 1 - - - - - -   Bands 0 - 6 % - - - - - - -   Blasts 0 % - - - - - - -   Albumin 3.5 - 5.0 g/dL 3. 0(L) 2. 7(L) 2. 8(L) 2. 7(L) 2. 8(L) 2. 6(L) 2. 3(L)   Calcium 8.5 - 10.1 MG/DL 8.5 8.7 8.6 8.7 8.7 8.7 8.7 SGOT 15 - 37 U/L 15 10(L) 12(L) 14(L) 15 15 14(L)   Glucose 65 - 100 mg/dL 84 82 99 102(H) 83 94 91   BUN 6 - 20 MG/DL 20 17 18 18 17 18 18   Creatinine 0.70 - 1.30 MG/DL 1.09 0.98 1.04 0.99 1.05 1.03 0.94   Sodium 136 - 145 mmol/L 139 138 138 136 137 140 138   Potassium 3.5 - 5.1 mmol/L 4.0 3.8 4.3 4.0 4.6 4.3 4.4   LDH 85 - 241 U/L - 166 - 175 - 174 170   CEA ng/mL - - - - - 5.4 -   Some recent data might be hidden     Lab Results   Component Value Date/Time    TSH 2.30 12/03/2019 03:29 AM    TSH 2.40 10/25/2019 07:39 PM    TSH 2.45 06/01/2019 04:16 AM       ALLERGY:  Allergies   Allergen Reactions    Cefepime Other (comments)     myoclonus        CURRENT MEDICATIONS:    Current Outpatient Medications:     albuterol (PROVENTIL HFA, VENTOLIN HFA, PROAIR HFA) 90 mcg/actuation inhaler, Take 2 Puffs by inhalation every six (6) hours as needed for Wheezing., Disp: 1 Inhaler, Rfl: 3    warfarin (COUMADIN) 1 mg tablet, Take 3 Tabs by mouth daily. May take additional tab as directed by provider., Disp: 100 Tab, Rfl: 0    warfarin (COUMADIN) 3 mg tablet, Take 3 mg by mouth daily. , Disp: , Rfl:     hydrALAZINE (APRESOLINE) 10 mg tablet, Take  by mouth every eight (8) hours. , Disp: , Rfl:     ciprofloxacin HCl (CIPRO) 500 mg tablet, Take  by mouth two (2) times a day., Disp: , Rfl:     atorvastatin (LIPITOR) 40 mg tablet, Take  by mouth daily. , Disp: , Rfl:     sacubitril-valsartan (ENTRESTO) 24 mg/26 mg tablet, Take 1 Tab by mouth two (2) times a day., Disp: , Rfl:     potassium chloride SR (KLOR-CON 10) 10 mEq tablet, Take 1 Tab by mouth daily. Take 1/2 tablet daily, Disp: 30 Tab, Rfl: 2    cholecalciferol (VITAMIN D3) (1000 Units /25 mcg) tablet, Take 2 Tabs by mouth daily. , Disp: 60 Tab, Rfl: 2    clonazePAM (KLONOPIN) 0.5 mg tablet, Take 0.5 Tabs by mouth three (3) times daily. Max Daily Amount: 0.75 mg., Disp: 90 Tab, Rfl: 2    finasteride (PROSCAR) 5 mg tablet, Take 1 Tab by mouth daily. , Disp: 30 Tab, Rfl: 2    L. acidoph & paracasei- S therm- Bifido (TIAN-Q/RISAQUAD) 8 billion cell cap cap, Take 1 Cap by mouth daily. , Disp: 30 Cap, Rfl: 2    levETIRAcetam (KEPPRA) 250 mg tablet, Take 1 Tab by mouth two (2) times a day., Disp: 60 Tab, Rfl: 2    magnesium oxide (MAG-OX) 400 mg tablet, Take 2 Tabs by mouth two (2) times a day., Disp: 60 Tab, Rfl: 2    therapeutic multivitamin (THERAGRAN) tablet, Take 1 Tab by mouth daily. , Disp: 30 Tab, Rfl: 2    venlafaxine-SR (EFFEXOR-XR) 150 mg capsule, Take 1 Cap by mouth daily (with breakfast). , Disp: 30 Cap, Rfl: 2    tamsulosin (FLOMAX) 0.4 mg capsule, Take 0.4 mg by mouth daily. , Disp: , Rfl: 3    aspirin delayed-release 81 mg tablet, Take 81 mg by mouth daily. , Disp: , Rfl:       Thank you for letting us see him with you,      Sergio Carey NP  [address], [address] [place] [address]  Office [phone]  Fax [phone]

## 2020-03-02 NOTE — PATIENT INSTRUCTIONS
Please call urology to make a follow up appointment    Please call sleep medicine to make a follow up appointment    Please call nephrology to make a follow up infection

## 2020-03-02 NOTE — PROGRESS NOTES
Spoke with patient's wife. Sanford Gar verbalized understanding of Coumadin dosing and repeat INR in 1 week.

## 2020-03-09 NOTE — TELEPHONE ENCOUNTER
Received notice from [facility] that patient had not returned call to set up genetic counseling. Spoke with patient's wife at follow up appointment. Nalini Myers stated that she spoke with [facility] but was declining to make an appointment. She stated that she felt [facility] was too pushy and she and Antoineus felt overwhelmed. Anival Weston NP was informed and discussed with patient and Ling Rodríguez.

## 2020-03-09 NOTE — PROGRESS NOTES
[address] [facility] in [place], [place]  Office Visit       Patient name: Tammie Vidal  Patient : 1950  Patient MRN: [id number]  Attending MD: Leti att. providers found  Date of service: 20    CHIEF COMPLAINT:  Chief Complaint   Patient presents with    Follow-up     LVAD    Leg Swelling         HPI: Sona Kiser is a 71y.o. year old white male with a history of HTN, HLD, JOSE, CAD s/p cardiac arrest VFib s/p CABG () c/b sternal would infection and sternectomy, ischemic cardiomyopathy LVEF 15-20%, s/p BiVICD  who underwent LVAD as DT on 2019 after bridging with Impella 5.0.  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 He was transferred to [address] [place] [address] rehab on  and discharged from rehab on 2020. Jae Khan returns to clinic today for LVAD follow up. He has not fallen since prior to his last visit. He continues to have intermittent dizziness early in the AM and following PT/OT. His weight is stable, and he denies orthopnea, dyspnea, CP, palpitations, nausea, vomiting, early satiety. He has stable LE edema, L>R. An ultrasound performed last week was negative for DVT. He is eating well and is compliant with his medications. Of note, his wife has noticed increased drainage and erythema surrounding his drive line exit site. He reports dropping his batteries several times in the last few days. He denies fevers, rigors, or chills.      Assessment/Plan:   NYHA Class III    ICM - Stage D,  LVEF 15%, NYHA Class IV improved to NYHA Class III s/p HM3 LVAD as DT on 19 with Impella 5.0 as bridge to LVAD   Continue LVAD speed at 5800 rpm   Intolerant to BB d/t RV failure   Continue hydralazine 10 mg TID   Cont entresto 24/26 mg, 0.5 tablet twice daily - increase at next visit if MAP stable    Will not increase today in setting of active infection    Continue to hold torsemide   [facility] BiV interrogated today - thoracic impedence remains low, but above threshold    Resume CardioMEMS monitoring in AM    Dr. Alessandra Francis would like to pull sternal wire once stable - will need to wait until active infection has resolved    Follow up in the LVAD clinic in 1 week   Repeat TTE March 30th     Chronic anticoagulation, goal INR 1.5-2.0   Continue warfarin and ASA    INR drawn in clinic today     Drive line exit site erythema, drainage   Wound culture obtained   Begin doxycycline 100 mg PO BID - d/w Dr. Nitesh Plata   Daily dressing changes   Ultrasound on Thursday     VT - s/p [facility] BiVICD   No recent shocks   Follow up with EP as directed    Interrogated today - thoracic impedence decreased, but above threshold     COPD   Albuterol MRI prn    JOSE   Encouraged patient to be compliant with CPAP therapy   Follow up with Dr. Levi Duron     Diverticulosis   Discontinue PPI, carafate   Monitor for GI bleeding     Recurrent UTI   On cipro x 6-12 months, possibly for life   Appointment with Urologist this Thursday   Continue Flomax and Proscar for now     Nutrition   Check prealbumin   Reports good appetite, increased protein intake     Tremors   Resolved   Continue keppra 250 mg BID   Continue clonazepam 0.5 mg BID   Significant relief from klonopin - plan to wean keppra once symptoms improved with klonopin at next visit     BPH   On finasteride and tamsulosin   Follow up with urology Thursday    Depression   On Effexor - mg daily     Persistently elevated CEA   Persistently elevated CEA; PET scan shows increased activity RML of lung.     Oncology consult appreciated - PET not suggestive of malignancy     Orthostatic Hypotension   Improved   Encourage use of Angel Hose    PAF   Off digoxin d/t tremors   Intolerant of BB d/t RV failure   On warfarin    LLE edema   Worse then right   Duplex negative for DVT        CARDIAC IMAGING:  Chest CTA- no outflow graft occlusion   Pet Scan equivocal for amyloid  Send [employer]- pending     10/25/19   ECHO ADULT COMPLETE 01/29/2020 1/29/2020    Narrative · Severely dilated left ventricle. Upper normal wall thickness. Severe   systolic dysfunction. Estimated left ventricular ejection fraction is   <15%. Left ventricular diastolic dysfunction. · Mitral valve thickening. Minimal mitral valve prolapse of the anterior   mitral valve leaflet. Trace mitral valve regurgitation is present. · Mild tricuspid valve regurgitation is present. · Mildly dilated right ventricle. Moderately reduced RV systolic function   (TAPSE 1.3 cm, RV S' 6 cm/s). Pacer/ICD present. · Mildly biatrial enlargement  · Severely elevated central venous pressure (15+ mmHg); IVC diameter is   larger than 21 mm and collapses less than 50% with respiration. · LVAD inflow cannula spectral Doppler tracings not obtained.         Signed by: Dominique Mahoney MD          OTHER IMAGING:  Head CT negative for acute process  EEG suggestive of mild generalized encephalopathic process, possibly related to underlying structural brain injury vs metabolic abnormality    Visit Vitals  BP (!) 84/0 (BP 1 Location: Right arm, BP Patient Position: Sitting)   Pulse 87   Temp 98.3 °F (36.8 °C) (Oral)   Resp 24   Ht 6' 2\" (1.88 m)   Wt 189 lb (85.7 kg)   SpO2 96%   BMI 24.27 kg/m²        LVAD   Pump Speed (RPM): 5800  Pump Flow (LPM): 5.2  PI (Pulsitility Index): 2.9  Power: 4.6  Back Up  at Bedside & Labeled: (P) Yes  Driveline Site Care: (P) Yes  Driveline Dressing: (P) Changed per order  Outpatient: (P) Yes  MAP in Therapeutic Range (Outpatient): (P) Yes  Testing  Alarms Reviewed: (P) Yes  Back up SC speed: (P) 5800  Back up Low Speed Limit: (P) 5400  Emergency Equipment with Patient?: (P) Yes  Emergency procedures reviewed?: (P) Yes  Drive line site inspected?: (P) Yes  Drive line intergrity inspected?: (P) Yes  Drive line dressing changed?: (P) Yes    Results for orders placed or performed in visit on 03/09/20   TN INTERROGATION VAD IN [place]SON W/PHYS/QHP ANALYSIS    Impression    Alarm history reviewed. Please see VAD flow sheet for readings. Occasional PI events but no adverse alarms noted. Settings reviewed, but no changes made. Review of Systems   Constitutional: Negative for chills and fever. HENT: Negative for hearing loss. Eyes: Negative for blurred vision. Respiratory: Negative for cough, hemoptysis and shortness of breath. Cardiovascular: Positive for leg swelling. Negative for chest pain, palpitations, orthopnea and PND. Gastrointestinal: Negative for heartburn, nausea and vomiting. Genitourinary: Negative. Musculoskeletal: Negative for myalgias. Skin:        L arm hematoma    Neurological: Positive for dizziness and weakness. Negative for headaches. Intermittent dizziness when standing up too fast  Weakness after PT/OT sessions    Endo/Heme/Allergies: Bruises/bleeds easily. Psychiatric/Behavioral: Positive for depression. Physical Exam   Constitutional: He is oriented to person, place, and time. He appears well-developed and well-nourished. No distress. HENT:   Head: Normocephalic. Neck: Normal range of motion. Neck supple. No JVD present. Cardiovascular: Normal rate, regular rhythm and normal heart sounds. + VAD hum    Pulmonary/Chest: Effort normal and breath sounds normal. No respiratory distress. Abdominal: Soft. Bowel sounds are normal. He exhibits no distension. Musculoskeletal: Normal range of motion. General: Edema present. Neurological: He is alert and oriented to person, place, and time. Skin: Skin is warm and dry. L arm laceration and hematoma, general ecchymosis   Erythema and tenderness surrounding drive line exit site   Psychiatric: He has a normal mood and affect. Vitals reviewed.       PAST MEDICAL HISTORY:  Past Medical History:   Diagnosis Date    Degenerative disc disease, lumbar     Heart failure ([address]     High cholesterol     Hypertension     Paroxysmal atrial fibrillation ([address] 2019    Spinal stenosis        PAST SURGICAL HISTORY:  Past Surgical History:   Procedure Laterality Date    COLONOSCOPY Left 2019    COLONOSCOPY at bedside performed by Parris [facility]urch [facility] at [facility] ENDOSCOPY    HX APPENDECTOMY      HX CORONARY ARTERY BYPASS GRAFT      triple    HX HEART ASSIST DEVICE Left 10/29/2019    Impella 5.0    HX HEART ASSIST DEVICE Left 2019    HeartMate 3    HX HERNIA REPAIR      HX IMPLANTABLE CARDIOVERTER DEFIBRILLATOR      HX THROMBECTOMY Left 2019    Left brachial thrombectomy    [place] CARDIOVERSION ELECTIVE ARRHYTHMIA EXTERNAL N/A 6/10/2019    EP CARDIOVERSION performed by Dottie Stoll MD at [address] CATH LAB    [place] CARDIOVERSION ELECTIVE ARRHYTHMIA EXTERNAL N/A 2019    EP CARDIOVERSION performed by Georgeanna Cooks, MD at [address] [facility] [address] CATH LAB    [place] INSJ/RPLCMT PERM DFB W/TRNSVNS LDS 1/DUAL CHMBR N/A 2019    INSERT ICD BIV MULTI performed by Carl Chandler MD at [address] [facility] [address] CATH LAB    [place] TCAT IMPL WRLS P-ART PRS SNR L-T HEMODYN MNTR N/A 2019    IMPLANT HEART FAILURE MONITORING DEVICE performed by Clara Jackson MD at [address] [address] CATH LAB       FAMILY HISTORY:  Family History   Problem Relation Age of Onset    Lung Disease Mother     Hypertension Mother     Arthritis-osteo Mother     Heart Disease Mother     Heart Disease Father     Diabetes Father        SOCIAL HISTORY:  Social History     Socioeconomic History    Marital status:      Spouse name: Not on file    Number of children: Not on file    Years of education: Not on file    Highest education level: Not on file   Tobacco Use    Smoking status: Former Smoker     Last attempt to quit: 2010     Years since quittin.2    Smokeless tobacco: Never Used   Substance and Sexual Activity    Alcohol use: Not Currently     Comment: rarely    Drug use: Never   Other Topics Concern       LABORATORY RESULTS:     Labs Latest Ref Rng & Units 2/24/2020 1/30/2020 1/29/2020 1/28/2020 1/27/2020 1/26/2020 1/24/2020   WBC 4.1 - 11.1 K/uL 3.8(L) 3. 6(L) 3. 9(L) 3. 9(L) 4.5 3.4(L) 3. 9(L)   RBC 4.10 - 5.70 M/uL 3.64(L) 3.24(L) 3.37(L) 3.44(L) 3.50(L) 3.34(L) 3.30(L)   Hemoglobin 12.1 - 17.0 g/dL 10. 8(L) 9.5(L) 10. 1(L) 10. 1(L) 10. 3(L) 9.8(L) 9.7(L)   Hematocrit 36.6 - 50.3 % 36. 1(L) 32. 0(L) 33. 0(L) 33. 8(L) 34. 5(L) 33. 1(L) 32. 5(L)   MCV 80.0 - 99.0 FL 99. 2(H) 98.8 97.9 98.3 98.6 99. 1(H) 98.5   Platelets 702 - 786 K/uL 139(L) 104(L) 129(L) 136(L) 144(L) 136(L) 124(L)   Lymphocytes 12 - 49 % 18 - - - - - -   Monocytes 5 - 13 % 10 - - - - - -   Eosinophils 0 - 7 % 3 - - - - - -   Basophils 0 - 1 % 1 - - - - - -   Bands 0 - 6 % - - - - - - -   Blasts 0 % - - - - - - -   Albumin 3.5 - 5.0 g/dL 3. 0(L) 2. 7(L) 2. 8(L) 2. 7(L) 2. 8(L) 2. 6(L) 2. 3(L)   Calcium 8.5 - 10.1 MG/DL 8.5 8.7 8.6 8.7 8.7 8.7 8.7   SGOT 15 - 37 U/L 15 10(L) 12(L) 14(L) 15 15 14(L)   Glucose 65 - 100 mg/dL 84 82 99 102(H) 83 94 91   BUN 6 - 20 MG/DL 20 17 18 18 17 18 18   Creatinine 0.70 - 1.30 MG/DL 1.09 0.98 1.04 0.99 1.05 1.03 0.94   Sodium 136 - 145 mmol/L 139 138 138 136 137 140 138   Potassium 3.5 - 5.1 mmol/L 4.0 3.8 4.3 4.0 4.6 4.3 4.4   LDH 85 - 241 U/L - 166 - 175 - 174 170   CEA ng/mL - - - - - 5.4 -   Some recent data might be hidden     Lab Results   Component Value Date/Time    TSH 2.30 12/03/2019 03:29 AM    TSH 2.40 10/25/2019 07:39 PM    TSH 2.45 06/01/2019 04:16 AM       ALLERGY:  Allergies   Allergen Reactions    Cefepime Other (comments)     myoclonus        CURRENT MEDICATIONS:    Current Outpatient Medications:     sacubitril-valsartan (ENTRESTO) 24 mg/26 mg tablet, Take 0.5 Tabs by mouth two (2) times a day., Disp: 30 Tab, Rfl: 1    albuterol (PROVENTIL HFA, VENTOLIN HFA, PROAIR HFA) 90 mcg/actuation inhaler, Take 2 Puffs by inhalation every six (6) hours as needed for Wheezing., Disp: 1 Inhaler, Rfl: 3    warfarin (COUMADIN) 1 mg tablet, Take 3 Tabs by mouth daily. May take additional tab as directed by provider., Disp: 100 Tab, Rfl: 0    warfarin (COUMADIN) 3 mg tablet, Take 3 mg by mouth daily. , Disp: , Rfl:     hydrALAZINE (APRESOLINE) 10 mg tablet, Take  by mouth every eight (8) hours. , Disp: , Rfl:     ciprofloxacin HCl (CIPRO) 500 mg tablet, Take  by mouth two (2) times a day., Disp: , Rfl:     atorvastatin (LIPITOR) 40 mg tablet, Take  by mouth daily. , Disp: , Rfl:     potassium chloride SR (KLOR-CON 10) 10 mEq tablet, Take 1 Tab by mouth daily. Take 1/2 tablet daily, Disp: 30 Tab, Rfl: 2    cholecalciferol (VITAMIN D3) (1000 Units /25 mcg) tablet, Take 2 Tabs by mouth daily. , Disp: 60 Tab, Rfl: 2    clonazePAM (KLONOPIN) 0.5 mg tablet, Take 0.5 Tabs by mouth three (3) times daily. Max Daily Amount: 0.75 mg., Disp: 90 Tab, Rfl: 2    finasteride (PROSCAR) 5 mg tablet, Take 1 Tab by mouth daily. , Disp: 30 Tab, Rfl: 2    L. acidoph & paracasei- S therm- Bifido (TIAN-Q/RISAQUAD) 8 billion cell cap cap, Take 1 Cap by mouth daily. , Disp: 30 Cap, Rfl: 2    levETIRAcetam (KEPPRA) 250 mg tablet, Take 1 Tab by mouth two (2) times a day., Disp: 60 Tab, Rfl: 2    magnesium oxide (MAG-OX) 400 mg tablet, Take 2 Tabs by mouth two (2) times a day., Disp: 60 Tab, Rfl: 2    therapeutic multivitamin (THERAGRAN) tablet, Take 1 Tab by mouth daily. , Disp: 30 Tab, Rfl: 2    venlafaxine-SR (EFFEXOR-XR) 150 mg capsule, Take 1 Cap by mouth daily (with breakfast). , Disp: 30 Cap, Rfl: 2    tamsulosin (FLOMAX) 0.4 mg capsule, Take 0.4 mg by mouth daily. , Disp: , Rfl: 3    aspirin delayed-release 81 mg tablet, Take 81 mg by mouth daily. , Disp: , Rfl:       Thank you for letting us see him with you,      Frannie Trinidad, TIERRA  [facility]  [address]  Office [phone]  Fax [phone]

## 2020-03-09 NOTE — PATIENT INSTRUCTIONS
Medication changes:  Start Doyycycline 100mg capsule 2 times per day. Testing Ordered:    Echo scheduled for 3/30/20 at 1:30PM.    Abdominal Ultrasound on Thursday (3/12/20) at 0830 to check driveline site. Do not eat or drink after midnight. You have hold your morning pills until after you eat. Drive line exit site cultured. We will call with results. Other Recommendations:     Start taking daily Cardiomems readings. Start daily driveline dressing change until we receive your wound culture results. Left arm wound - Apply Aquacel Ag to wound. Cover with dry dressing and secure. Change every 2-3 days. Ensure you are drinking an adequate amount of water with a goal of 2 pitchers (hospital provided) of fluid daily. Your urine should be clear and light yellow straw colored. Follow up in 1 week with Calos Rivas1    Please bring your daily sheets and medications to your next appointment. Please monitor your weights daily upon waking and after using the bathroom. Keep a written records of your weights and bring to your next appointment. If you have a weight gain of 3 or more pounds overnight OR 5 or more pounds in one week please contact our office. Thank you for allowing us the privilege of being a part of your healthcare team! Please do not hesitate to contact our office at [phone] with any questions or concerns.

## 2020-03-10 NOTE — PROGRESS NOTES
Spoke with patient's wife. Sonam Sandoval verbalized understanding of Coumadin dosing and repeat INR on Thursday 3/12/20.

## 2020-03-12 NOTE — TELEPHONE ENCOUNTER
Results reviewed. Notable for staph aureus (speciation pending) isolated from drive line exit wound and fluid collection identified with abd US. D/w Dr. Evelyne Lemus - will continue doxycycline 100 mg PO BID and obtain chest/abd CT to further evaluate for abscess. Will require immediate IP admission for any s/s systemic infection (fever, chills) - OK to observe as OP for now in absence of those symptoms. Continue daily dressing changes, no showering.

## 2020-03-12 NOTE — TELEPHONE ENCOUNTER
Received call from Irineo Bynum,  requesting LVAD Coordinator call outpatient Radiology/Ultrasound ([id number]) to assist staff with patient's LVAD dressing. Called and spoke with staff. Stated would come and manage patient's LVAD dressing. Assembled supplies and called Ultrasound staff again, stating I would bring supplies and requested staff place a large tegaderm over site using sterile technique before starting ultrasound. Staff member stated they would wait for LVAD Coordinator to arrive. Upon arrival at outpatient Ultrasound, staff stated that the ultrasound was completed. Inquired if staff had used a tegaderm over the site or a sterile probe cover. Staff member stated she used \"sterile gel\". Upon entering patient's room, noted patient and patient's wife with procedure masks in place and ultrasound gel covering LVAD driveline exit site. Cleaned site using sterile technique  and completed dressing changeper policy .   Upon leaving Ultrasound, gave staff members 4 large tergaderm dressings and asked that in the future to please place a tegaderm dressing over the driveline exit site using sterile technique or to call LVAD Coordinator to assist.

## 2020-03-13 NOTE — TELEPHONE ENCOUNTER
Called and spoke with patient's wife. Heidi Snow verbalized understanding of culture results and US results. Heidi Snow will bring patient for CT on Monday, but is requesting LVAD Coordinator attend.

## 2020-03-13 NOTE — PROGRESS NOTES
Spoke with patient's wife. Sacha Gilbert verbalized understanding of Coumadin dosing and repeat INR on Monday 3/16/20 in clinic.

## 2020-03-16 NOTE — PROGRESS NOTES
[facility] in [place], [place]  Office Visit       Patient name: Cornel Silverio  Patient : 1950  Patient MRN: [id number]  Attending MD: Leti att. providers found  Date of service: 20    CHIEF COMPLAINT:  Chief Complaint   Patient presents with    Follow-up     LVAD          HPI: Beata Kiser is a 71y.o. year old white male with a history of HTN, HLD, JOSE, CAD s/p cardiac arrest VFib s/p CABG () c/b sternal would infection and sternectomy, ischemic cardiomyopathy LVEF 15-20%, s/p BiVICD  who underwent LVAD as DT on 2019 after bridging with Impella 5.0.  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 He was transferred to [address] [place] [address] rehab on  and discharged from rehab on 2020. Bridgett Rasmussen returns to clinic today for LVAD follow up. At his last office visit, his drive line exit site was inspected and found to be markedly erythematous and tender, with purulent drainage. Wound culture obtained 3/9 is positive for scant MSSA growth for which he was started on doxycycline pending speciation. His wife has been performing daily dressing changes and he is taking his abx without issue. He denies fevers, rigors, chills, or other s/s systemic infection. Otherwise, he feels well. He is continuing to work with PT/OT. His appetite is good and he is sleeping well. His last CardioMEMS reading was 12 mmHg. He denies dizziness, lightheadedness, CP, palpitations, N/V, abdominal distention, early satiety, bowel or bladder problems.      Assessment/Plan:   NYHA Class III    ICM - Stage D,  LVEF 15%, NYHA Class IV improved to NYHA Class III s/p HM3 LVAD as DT on 19 with Impella 5.0 as bridge to LVAD   Continue LVAD speed at 5800 rpm    Most recent CardioMEMS reading - 12 mmHg    Encouraged to increase PO fluid intake    Intolerant to BB d/t RV failure   Continue hydralazine 10 mg TID   Cont Entresto 24/26 mg, 0.5 tablet twice daily - increase at next visit if MAP stable    Will not increase today in setting of active infection and low PAd   Continue to hold torsemide   Daily CardioMEMS monitoring   Dr. Christin Manriquez would like to pull sternal wire once stable - will need to wait until active infection has resolved    Follow up in the LVAD clinic in 1 week   Repeat TTE March 30th     Chronic anticoagulation, goal INR 1.5-2.0   Continue warfarin and ASA    INR drawn in clinic today - 1.7    MSSA drive line infection   D/C doxycycline - begin Augmentin 875/125 mg PO BID x 10 days   Gentamicin to exit site with daily dressing changes   US confirms fluid collection - CT pending    Will continue OP management for now; discussed importance of compliance with regimen and potential for IP management    VT - s/p [facility] BiVICD   No recent shocks   Follow up with EP as directed    Interrogate at next visit    COPD   Albuterol MRI prn    JOSE   Encouraged patient to be compliant with CPAP therapy   Follow up with Dr. Dax Foster     Diverticulosis   Discontinue PPI, carafate   Monitor for GI bleeding     Recurrent UTI   On cipro x 6-12 months, possibly for life   Follows up with Dr. Santiago Nails and Proscar for now     Nutrition   Check prealbumin   Reports good appetite, increased protein intake     Tremors   Resolved   Continue keppra 250 mg BID   Continue clonazepam 0.5 mg BID   Significant relief from klonopin - plan to wean keppra once symptoms improved with klonopin at next visit     BPH   On finasteride and tamsulosin   Follow up with urology Thursday    Depression   On Effexor - mg daily     Persistently elevated CEA   Persistently elevated CEA; PET scan shows increased activity RML of lung.     Oncology consult appreciated - PET not suggestive of malignancy     Orthostatic Hypotension   Improved   Encourage use of Alonna Lively    PAF   Off digoxin d/t tremors   Intolerant of BB d/t RV failure   On warfarin    LLE edema   Worse then right   Duplex negative for DVT        CARDIAC IMAGING:  Chest CTA- no outflow graft occlusion   Pet Scan equivocal for amyloid  Send [employer]- pending     10/25/19   ECHO ADULT COMPLETE 01/29/2020 1/29/2020    Narrative · Severely dilated left ventricle. Upper normal wall thickness. Severe   systolic dysfunction. Estimated left ventricular ejection fraction is   <15%. Left ventricular diastolic dysfunction. · Mitral valve thickening. Minimal mitral valve prolapse of the anterior   mitral valve leaflet. Trace mitral valve regurgitation is present. · Mild tricuspid valve regurgitation is present. · Mildly dilated right ventricle. Moderately reduced RV systolic function   (TAPSE 1.3 cm, RV S' 6 cm/s). Pacer/ICD present. · Mildly biatrial enlargement  · Severely elevated central venous pressure (15+ mmHg); IVC diameter is   larger than 21 mm and collapses less than 50% with respiration. · LVAD inflow cannula spectral Doppler tracings not obtained. Signed by: Alexandria Cruz MD          OTHER IMAGING:  Head CT negative for acute process  EEG suggestive of mild generalized encephalopathic process, possibly related to underlying structural brain injury vs metabolic abnormality    Visit Vitals  BP (!) 96/0 (BP 1 Location: Left arm, BP Patient Position: Sitting) Comment: associated with a radial pulse   Pulse 73   Temp 98 °F (36.7 °C) (Oral)   Resp 20   Ht 6' 2\" (1.88 m)   Wt 189 lb (85.7 kg)   SpO2 100%   BMI 24.27 kg/m²        LVAD   Pump Speed (RPM): 5800  Pump Flow (LPM): 4.9  PI (Pulsitility Index): 3.9  Power: 4.5       Results for orders placed or performed in visit on 03/16/20   AMB PT/INR EXTERNAL   Result Value Ref Range    INR, External 1.7    AK INTERROGATION VAD IN PRSON W/PHYS/QHP ANALYSIS    Impression    Alarm history reviewed. Please see VAD flow sheet for readings.  No PI events or adverse alarms noted. Settings reviewed, but no changes made. Review of Systems   Constitutional: Negative for chills and fever. HENT: Negative for hearing loss. Eyes: Negative for blurred vision. Respiratory: Negative for cough, hemoptysis and shortness of breath. Cardiovascular: Positive for leg swelling. Negative for chest pain, palpitations, orthopnea and PND. Gastrointestinal: Negative for heartburn, nausea and vomiting. Genitourinary: Negative. Musculoskeletal: Negative for myalgias. Skin:        L arm hematoma    Neurological: Positive for weakness. Negative for dizziness and headaches. Intermittent dizziness when standing up too fast  Weakness after PT/OT sessions    Endo/Heme/Allergies: Bruises/bleeds easily. Psychiatric/Behavioral: Negative for depression. Physical Exam   Constitutional: He is oriented to person, place, and time. He appears well-developed and well-nourished. No distress. HENT:   Head: Normocephalic. Neck: Normal range of motion. Neck supple. No JVD present. Cardiovascular: Normal rate, regular rhythm and normal heart sounds. + VAD hum    Pulmonary/Chest: Effort normal and breath sounds normal. No respiratory distress. Abdominal: Soft. Bowel sounds are normal. He exhibits no distension. Musculoskeletal: Normal range of motion. General: Edema present. Neurological: He is alert and oriented to person, place, and time. Skin: Skin is warm and dry. L arm laceration and hematoma, general ecchymosis   Erythema and tenderness surrounding drive line exit site   Psychiatric: He has a normal mood and affect. Vitals reviewed.       PAST MEDICAL HISTORY:  Past Medical History:   Diagnosis Date    Degenerative disc disease, lumbar     Heart failure ([address]     High cholesterol     Hypertension     Paroxysmal atrial fibrillation ([address] 4/2/2019    Spinal stenosis        PAST SURGICAL HISTORY:  Past Surgical History:   Procedure Laterality Date    COLONOSCOPY Left 2019    COLONOSCOPY at bedside [facility]sha Su [facility] at [facility] ENDOSCOPY    HX APPENDECTOMY      HX CORONARY ARTERY BYPASS GRAFT      triple    HX HEART ASSIST DEVICE Left 10/29/2019    Impella 5.0    HX HEART ASSIST DEVICE Left 2019    HeartMate 3    HX HERNIA REPAIR      HX IMPLANTABLE CARDIOVERTER DEFIBRILLATOR      HX THROMBECTOMY Left 2019    Left brachial thrombectomy    NE CARDIOVERSION ELECTIVE ARRHYTHMIA EXTERNAL N/A 6/10/2019    EP CARDIOVERSION performed by Liz Renteria MD at [address] CATH LAB    NE CARDIOVERSION ELECTIVE ARRHYTHMIA EXTERNAL N/A 2019    EP CARDIOVERSION performed by Connie May MD at [address] CATH LAB    NE INSJ/RPLCMT PERM DFB W/TRNSVNS LDS 1/DUAL CHMBR N/A 2019    INSERT ICD BIV MULTI performed by Alexi Mathew MD at [address] CATH LAB    NE TCAT IMPL WRLS P-ART PRS SNR L-T HEMODYN MNTR N/A 2019    IMPLANT HEART FAILURE MONITORING DEVICE performed by Loi Mancia MD at [address] CATH LAB       FAMILY HISTORY:  Family History   Problem Relation Age of Onset    Lung Disease Mother     Hypertension Mother     Arthritis-osteo Mother     Heart Disease Mother     Heart Disease Father     Diabetes Father        SOCIAL HISTORY:  Social History     Socioeconomic History    Marital status:      Spouse name: Not on file    Number of children: Not on file    Years of education: Not on file    Highest education level: Not on file   Tobacco Use    Smoking status: Former Smoker     Last attempt to quit: 2010     Years since quittin.2    Smokeless tobacco: Never Used   Substance and Sexual Activity    Alcohol use: Not Currently     Comment: rarely    Drug use: Never   Other Topics Concern       LABORATORY RESULTS:     Labs Latest Ref Rng & Units 3/9/2020 2020 2020 2020 2020 2020 2020   WBC 3.4 - 10.8 x10E3/uL 4.0 3.8(L) 3.6(L) 3. 9(L) 3. 9(L) 4.5 3.4(L)   RBC 4.14 - 5.80 x10E6/uL 3.52(L) 3.64(L) 3.24(L) 3.37(L) 3.44(L) 3.50(L) 3.34(L)   Hemoglobin 13.0 - 17.7 g/dL 10. 5(L) 10. 8(L) 9.5(L) 10. 1(L) 10. 1(L) 10. 3(L) 9.8(L)   Hematocrit 37.5 - 51.0 % 32. 9(L) 36. 1(L) 32. 0(L) 33. 0(L) 33. 8(L) 34. 5(L) 33. 1(L)   MCV 79 - 97 fL 94 99. 2(H) 98.8 97.9 98.3 98.6 99. 1(H)   Platelets 881 - 407 L00H9/(L) 139(L) 104(L) 129(L) 136(L) 144(L) 136(L)   Lymphocytes 12 - 49 % - 18 - - - - -   Monocytes 5 - 13 % - 10 - - - - -   Eosinophils 0 - 7 % - 3 - - - - -   Basophils 0 - 1 % - 1 - - - - -   Bands 0 - 6 % - - - - - - -   Blasts 0 % - - - - - - -   Albumin 3.8 - 4.8 g/dL 3.5(L) 3.0(L) 2. 7(L) 2. 8(L) 2. 7(L) 2. 8(L) 2. 6(L)   Calcium 8.6 - 10.2 mg/dL 9.0 8.5 8.7 8.6 8.7 8.7 8.7   SGOT 0 - 40 IU/L 14 15 10(L) 12(L) 14(L) 15 15   Glucose 65 - 99 mg/dL 81 84 82 99 102(H) 83 94   BUN 8 - 27 mg/dL 26 20 17 18 18 17 18   Creatinine 0.76 - 1.27 mg/dL 1.06 1.09 0.98 1.04 0.99 1.05 1.03   Sodium 134 - 144 mmol/L 140 139 138 138 136 137 140   Potassium 3.5 - 5.2 mmol/L 4.9 4.0 3.8 4.3 4.0 4.6 4.3    - 224 IU/L 257(H) - 166 - 175 - 174   CEA ng/mL - - - - - - 5.4   Some recent data might be hidden     Lab Results   Component Value Date/Time    TSH 2.30 12/03/2019 03:29 AM    TSH 2.40 10/25/2019 07:39 PM    TSH 2.45 06/01/2019 04:16 AM       ALLERGY:  Allergies   Allergen Reactions    Cefepime Other (comments)     myoclonus        CURRENT MEDICATIONS:    Current Outpatient Medications:     tamsulosin (Flomax) 0.4 mg capsule, Take 0.8 mg by mouth nightly., Disp: , Rfl:     doxycycline (VIBRAMYCIN) 100 mg capsule, Take 1 Cap by mouth two (2) times a day., Disp: 28 Cap, Rfl: 1    aspirin delayed-release 81 mg tablet, Take 1 Tab by mouth daily. , Disp: 90 Tab, Rfl: 3    venlafaxine-SR (EFFEXOR-XR) 150 mg capsule, Take 1 Cap by mouth daily (with breakfast). , Disp: 90 Cap, Rfl: 3    therapeutic multivitamin (THERAGRAN) tablet, Take 1 Tab by mouth daily. , Disp: 90 Tab, Rfl: 3    magnesium oxide (MAG-OX) 400 mg tablet, Take 2 Tabs by mouth two (2) times a day., Disp: 360 Tab, Rfl: 3    levETIRAcetam (KEPPRA) 250 mg tablet, Take 1 Tab by mouth two (2) times a day., Disp: 180 Tab, Rfl: 3    L. acidoph & paracasei- S therm- Bifido (TIAN-Q/RISAQUAD) 8 billion cell cap cap, Take 1 Cap by mouth daily. , Disp: 90 Cap, Rfl: 3    finasteride (PROSCAR) 5 mg tablet, Take 1 Tab by mouth daily. , Disp: 90 Tab, Rfl: 3    cholecalciferol (VITAMIN D3) (1000 Units /25 mcg) tablet, Take 2 Tabs by mouth daily. , Disp: 180 Tab, Rfl: 3    potassium chloride SR (KLOR-CON 10) 10 mEq tablet, Take 1 Tab by mouth daily. Take 1/2 tablet daily, Disp: 90 Tab, Rfl: 3    atorvastatin (LIPITOR) 40 mg tablet, Take 1 Tab by mouth daily. , Disp: 90 Tab, Rfl: 3    hydrALAZINE (APRESOLINE) 10 mg tablet, Take 1 Tab by mouth three (3) times daily. , Disp: 270 Tab, Rfl: 3    warfarin (COUMADIN) 3 mg tablet, Take 1 Tab by mouth daily. May take additional tab as directed by provider., Disp: 120 Tab, Rfl: 3    clonazePAM (KLONOPIN) 0.5 mg tablet, Take 0.5 Tabs by mouth two (2) times a day. Max Daily Amount: 0.5 mg., Disp: 30 Tab, Rfl: 0    sacubitril-valsartan (ENTRESTO) 24 mg/26 mg tablet, Take 0.5 Tabs by mouth two (2) times a day., Disp: 30 Tab, Rfl: 1    albuterol (PROVENTIL HFA, VENTOLIN HFA, PROAIR HFA) 90 mcg/actuation inhaler, Take 2 Puffs by inhalation every six (6) hours as needed for Wheezing., Disp: 1 Inhaler, Rfl: 3    warfarin (COUMADIN) 1 mg tablet, Take 3 Tabs by mouth daily.  May take additional tab as directed by provider., Disp: 100 Tab, Rfl: 0    ciprofloxacin HCl (CIPRO) 500 mg tablet, Take  by mouth two (2) times a day., Disp: , Rfl:       Thank you for letting us see him with you,      TIERRA Ye  [facility], [address] [place] [address]  Office [phone]  Fax [phone]

## 2020-03-16 NOTE — PATIENT INSTRUCTIONS
Medication changes:    Begin topical application of gentamicin ointment to your drive line exit site with each dressing change. Please be sure not to touch the top of the ointment tube when the lid is off. Please take this to your pharmacy to notify them of the change in medications. Testing Ordered:    Dr. Evelyne Lemus will review your CT scan. We will call you with the results. Other Recommendations:      Ensure your drinking an adequate amount of water with a goal of 6-8 eight ounce glasses (1.5-2 liters) of fluid daily. Your urine should be clear and light yellow straw colored. If your blood pressure begins to consistently run below 90/60 and/or you begin to experience dizziness or lightheadedness, please contact the [facility] at [phone]. Follow up in 1 week with [facility]      Please monitor your blood pressures daily prior to medications and 2 hours after taking medications. Bring a written record of your blood pressures to your next appointment. Please monitor your weights daily upon waking and after using the bathroom. Keep a written records of your weights and bring to your next appointment. If you have a weight gain of 3 or more pounds overnight OR 5 or more pounds in one week please contact our office. Thank you for allowing us the privilege of being a part of your healthcare team! Please do not hesitate to contact our office at [phone] with any questions or concerns.        4

## 2020-03-19 NOTE — TELEPHONE ENCOUNTER
Received call from Perlita Peer PT from [facility], left message for a return call. Spoke with patient's wife who questioned if PT/OT was necessary at this time. Spoke with Lavinia Quiles NP who stated that OT could be discontinued but in light of patient's recent falls - PT would need to continued. Spoke with Aimee Figueroa who verbalized understanding. Called and spoke with [facility] who states she will reach out to patient and attempt to schedule an appointment.

## 2020-03-20 NOTE — TELEPHONE ENCOUNTER
Reviewed CardioMEMS resu lts - notable for PAD 12 mmHg and  bpm.  Patient not on diuretics. Taking 1/2 tab 24/26 mg PO Entresto BID. Will increase PO fluid intake and reassess on Monday.

## 2020-03-20 NOTE — PROGRESS NOTES
Spoke with Laura Puente regarding INR. No changes to Coumadin and will recheck in the office Mon. Also asked him to increase his fluid intake per Cordell TIERRA Parry As the cardiomems shows that he needs to increase fluid intake. Verbalized understanding.  Kasey Sethi, RN VAD coordinator

## 2020-03-20 NOTE — PROGRESS NOTES
3/20/2020 2:00pm Left message for Abhilash Mom to return our call regarding Dolphus's labs.  Orly Todd RN VAD Coordinator

## 2020-03-23 NOTE — PROGRESS NOTES
Spoke with patient and patient's wife in clinic. Nael Llanes verbalized understanding of coumadin dosing and to repeat INR in 1 week.

## 2020-03-23 NOTE — PATIENT INSTRUCTIONS
No Medication changes today. Testing Ordered: We will change your echo for 2 weeks with your next appointment. Other Recommendations:      Ensure you are drinking an adequate amount of water with a goal of 6-8 eight ounce glasses (1.5-2 liters) of fluid daily. Your urine should be clear and light yellow straw colored. We will help you set up My Chart today. We can send messages or call through My Chart to help avoid unnecessary exposure. Follow up in 2 weeks with [facility] [place]    Please bring your daily sheets and medications to your next appointment. Please monitor your weights daily upon waking and after using the bathroom. Keep a written records of your weights and bring to your next appointment. If yet up ou have a weight gain of 3 or more pounds overnight OR 5 or more pounds in one week please contact our office. Thank you for allowing us the privilege of being a part of your healthcare team! Please do not hesitate to contact our office at [phone] with any questions or concerns.

## 2020-03-23 NOTE — PROGRESS NOTES
[facility] in [place], [place]  Office Visit       Patient name: Verona Cyr  Patient : 1950  Patient MRN: [id number]  Attending MD: Leti att. providers found  Date of service: 20    CHIEF COMPLAINT:  Chief Complaint   Patient presents with    Follow-up     LVAD         HPI: Sj Kiser is a 71y.o. year old white male with a history of HTN, HLD, JOSE, CAD s/p cardiac arrest VFib s/p CABG () c/b sternal would infection and sternectomy, ischemic cardiomyopathy LVEF 15-20%, s/p BiVICD  who underwent LVAD as DT on 2019 after bridging with Impella 5.0.  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 He was transferred to [facility] rehab on  and discharged from rehab on 2020. Meena Funes returns to clinic today for LVAD follow up. His drive line site has improved in appearance, he has a CT pending to evaluate his fluid collection. Wound culture obtained 3/9 is positive for scant MSSA growth and he has 1 more week of augmentin. His wife has been performing daily dressing changes and he is taking his abx without issue. He continues to eat well and has been working with home PT. He denies fevers, rigors, chills, or other s/s systemic infection. Otherwise, he feels well. He is continuing to work with PT/OT. His appetite is good and he is sleeping well. His last CardioMEMS reading was have been 10-12 mmHg. He denies dizziness, lightheadedness, CP, palpitations, N/V, abdominal distention, early satiety, bowel or bladder problems.      Assessment/Plan:   NYHA Class III    ICM - Stage D,  LVEF 15%, NYHA Class IV improved to NYHA Class III s/p HM3 LVAD as DT on 19 with Impella 5.0 as bridge to LVAD   Continue LVAD speed at 5800 rpm    Most recent CardioMEMS reading - 10 mmHg Encouraged to increase PO fluid intake    Intolerant to BB d/t RV failure   Continue hydralazine 10 mg TID   Cont Entresto 24/26 mg, 0.5 tablet twice daily    Will not increase today in setting of active infection and low PAd   Continue to hold torsemide   Daily CardioMEMS monitoring   Dr. Freddie Peabody would like to pull sternal wire once stable - will need to wait until active infection has resolved    Follow up in the LVAD clinic in 2 weeks   Repeat TTE in April     Chronic anticoagulation, goal INR 1.5-2.0   Continue warfarin and ASA    INR drawn in clinic today - 1.9    MSSA drive line infection   Continue Augmentin 875/125 mg PO BID x 10 days   Gentamicin to exit site with daily dressing changes   US confirms fluid collection - repeat abdominal CT pending    Will continue OP management for now; discussed importance of compliance with regimen and potential for IP management    VT - s/p [facility] BiVICD   No recent shocks   Follow up with EP as directed     COPD   Albuterol MRI prn    JOSE   Encouraged patient to be compliant with CPAP therapy   Follow up with Dr. Arevalo      Diverticulosis   Discontinue PPI, carafate   Monitor for GI bleeding     Recurrent UTI   On cipro x 6-12 months, possibly for life   Follows up with Dr. Kimberly Rodriges and Proscar for now     Nutrition   Check prealbumin with next lab draws   Reports good appetite, increased protein intake     Tremors   Resolved   Continue keppra 250 mg BID   Continue clonazepam 0.5 mg BID   Significant relief from klonopin - plan to wean keppra once symptoms improved with klonopin at next visit     BPH   On finasteride and tamsulosin   Follow up with urology Thursday    Depression   On Effexor - mg daily     Persistently elevated CEA   Persistently elevated CEA; PET scan shows increased activity RML of lung.     Oncology consult appreciated - PET not suggestive of malignancy     Orthostatic Hypotension   Improved   Encourage use of Shreya Hose    PAF   Off digoxin d/t tremors   Intolerant of BB d/t RV failure   On warfarin    LLE edema   Worse then right   Duplex negative for DVT        CARDIAC IMAGING:  Chest CTA- no outflow graft occlusion   Pet Scan equivocal for amyloid  Send [employer]- pending     10/25/19   ECHO ADULT COMPLETE 01/29/2020 1/29/2020    Narrative · Severely dilated left ventricle. Upper normal wall thickness. Severe   systolic dysfunction. Estimated left ventricular ejection fraction is   <15%. Left ventricular diastolic dysfunction. · Mitral valve thickening. Minimal mitral valve prolapse of the anterior   mitral valve leaflet. Trace mitral valve regurgitation is present. · Mild tricuspid valve regurgitation is present. · Mildly dilated right ventricle. Moderately reduced RV systolic function   (TAPSE 1.3 cm, RV S' 6 cm/s). Pacer/ICD present. · Mildly biatrial enlargement  · Severely elevated central venous pressure (15+ mmHg); IVC diameter is   larger than 21 mm and collapses less than 50% with respiration. · LVAD inflow cannula spectral Doppler tracings not obtained.         Signed by: Giovany King MD          OTHER IMAGING:  Head CT negative for acute process  EEG suggestive of mild generalized encephalopathic process, possibly related to underlying structural brain injury vs metabolic abnormality    Visit Vitals  BP (!) 86/0 (BP 1 Location: Left arm, BP Patient Position: Sitting)   Pulse 100   Temp 97.6 °F (36.4 °C) (Oral)   Resp 18   Ht 6' 2\" (1.88 m)   Wt 186 lb (84.4 kg)   SpO2 99%   BMI 23.88 kg/m²        LVAD   Pump Speed (RPM): 5850  Pump Flow (LPM): 5.2  PI (Pulsitility Index): 3  Power: 4.6  Back Up  at Bedside & Labeled: Yes  Driveline Site Care: Yes  Driveline Dressing: Changed per order  Outpatient: Yes  MAP in Therapeutic Range (Outpatient): Yes  Testing  Alarms Reviewed: Yes  Back up SC speed: 5800  Back up Low Speed Limit: 5400  Emergency Equipment with Patient?: Yes  Emergency procedures reviewed?: Yes  Drive line site inspected?: Yes  Drive line intergrity inspected?: Yes  Drive line dressing changed?: Yes    Results for orders placed or performed in visit on 03/23/20   VA INTERROGATION VAD IN [place] W/PHYS/QHP ANALYSIS    Narrative    Alarm history reviewed. Please see VAD flow sheet for readings. Min PI events but no adverse alarms noted. Settings reviewed, but no changes made. Review of Systems   Constitutional: Negative for chills and fever. HENT: Negative for hearing loss. Eyes: Negative for blurred vision. Respiratory: Negative for cough, hemoptysis and shortness of breath. Cardiovascular: Positive for leg swelling. Negative for chest pain, palpitations, orthopnea and PND. Gastrointestinal: Negative for heartburn, nausea and vomiting. Genitourinary: Negative. Musculoskeletal: Negative for myalgias. Skin:        Fragile skin    Neurological: Positive for weakness. Negative for dizziness and headaches. Intermittent dizziness when standing up too fast  Weakness after PT/OT sessions    Endo/Heme/Allergies: Does not bruise/bleed easily. Psychiatric/Behavioral: Negative for depression. Physical Exam   Constitutional: He is oriented to person, place, and time. He appears well-developed and well-nourished. No distress. HENT:   Head: Normocephalic. Neck: Normal range of motion. Neck supple. No JVD present. Cardiovascular: Normal rate, regular rhythm and normal heart sounds. + VAD hum    Pulmonary/Chest: Effort normal and breath sounds normal. No respiratory distress. Abdominal: Soft. Bowel sounds are normal. He exhibits no distension. Musculoskeletal: Normal range of motion. General: Edema present. Neurological: He is alert and oriented to person, place, and time. Skin: Skin is warm and dry.    L arm laceration and hematoma, general ecchymosis   Erythema and tenderness surrounding drive line exit site   Psychiatric: He has a normal mood and affect. Vitals reviewed.       PAST MEDICAL HISTORY:  Past Medical History:   Diagnosis Date    Degenerative disc disease, lumbar     Heart failure ([facility] [address]     High cholesterol     Hypertension     Paroxysmal atrial fibrillation ([facility] [address] 4/2/2019    Spinal stenosis        PAST SURGICAL HISTORY:  Past Surgical History:   Procedure Laterality Date    COLONOSCOPY Left 11/11/2019    COLONOSCOPY at bedside performed by Kev Fischer MD at [address] [place] [address] ENDOSCOPY    HX APPENDECTOMY      HX CORONARY ARTERY BYPASS GRAFT      triple    HX HEART ASSIST DEVICE Left 10/29/2019    Impella 5.0    HX HEART ASSIST DEVICE Left 11/18/2019    HeartMate 3    HX HERNIA REPAIR      HX IMPLANTABLE CARDIOVERTER DEFIBRILLATOR      HX THROMBECTOMY Left 11/25/2019    Left brachial thrombectomy    [place] CARDIOVERSION ELECTIVE ARRHYTHMIA EXTERNAL N/A 6/10/2019    EP CARDIOVERSION performed by Tammie Russell MD at [address], [facility] CATH LAB    [place] CARDIOVERSION ELECTIVE ARRHYTHMIA EXTERNAL N/A 6/18/2019    EP CARDIOVERSION performed by Kimani De Los Santos MD at [address] [facility] [address] CATH LAB    [place] INSJ/RPLCMT PERM DFB W/TRNSVNS LDS 1/DUAL CHMBR N/A 6/21/2019    INSERT ICD BIV MULTI performed by Say Fowler MD at [address] [facility] [address] CATH LAB    [place] TCAT Ela English P-ART PRS SNR L-T HEMODYN MNTR N/A 9/18/2019    IMPLANT HEART FAILURE MONITORING DEVICE performed by Kaur Pastrana MD at [address] CATH LAB       FAMILY HISTORY:  Family History   Problem Relation Age of Onset    Lung Disease Mother     Hypertension Mother     Arthritis-osteo Mother     Heart Disease Mother     Heart Disease Father     Diabetes Father        SOCIAL HISTORY:  Social History     Socioeconomic History    Marital status:      Spouse name: Not on file    Number of children: Not on file    Years of education: Not on file    Highest education level: Not on file   Tobacco Use    Smoking status: Former Smoker     Last attempt to quit: 12/1/2010     Years since quittin.3    Smokeless tobacco: Never Used   Substance and Sexual Activity    Alcohol use: Not Currently     Comment: rarely    Drug use: Never   Other Topics Concern       LABORATORY RESULTS:     Labs Latest Ref Rng & Units 3/9/2020 2020 2020 2020 2020 2020 2020   WBC 3.4 - 10.8 x10E3/uL 4.0 3.8(L) 3. 6(L) 3. 9(L) 3. 9(L) 4.5 3.4(L)   RBC 4.14 - 5.80 x10E6/uL 3.52(L) 3.64(L) 3.24(L) 3.37(L) 3.44(L) 3.50(L) 3.34(L)   Hemoglobin 13.0 - 17.7 g/dL 10. 5(L) 10. 8(L) 9.5(L) 10. 1(L) 10. 1(L) 10. 3(L) 9.8(L)   Hematocrit 37.5 - 51.0 % 32. 9(L) 36. 1(L) 32. 0(L) 33. 0(L) 33. 8(L) 34. 5(L) 33. 1(L)   MCV 79 - 97 fL 94 99. 2(H) 98.8 97.9 98.3 98.6 99. 1(H)   Platelets 055 - 529 K13Z0/(L) 139(L) 104(L) 129(L) 136(L) 144(L) 136(L)   Lymphocytes 12 - 49 % - 18 - - - - -   Monocytes 5 - 13 % - 10 - - - - -   Eosinophils 0 - 7 % - 3 - - - - -   Basophils 0 - 1 % - 1 - - - - -   Albumin 3.8 - 4.8 g/dL 3.5(L) 3.0(L) 2. 7(L) 2. 8(L) 2. 7(L) 2. 8(L) 2. 6(L)   Calcium 8.6 - 10.2 mg/dL 9.0 8.5 8.7 8.6 8.7 8.7 8.7   SGOT 0 - 40 IU/L 14 15 10(L) 12(L) 14(L) 15 15   Glucose 65 - 99 mg/dL 81 84 82 99 102(H) 83 94   BUN 8 - 27 mg/dL 26 20 17 18 18 17 18   Creatinine 0.76 - 1.27 mg/dL 1.06 1.09 0.98 1.04 0.99 1.05 1.03   Sodium 134 - 144 mmol/L 140 139 138 138 136 137 140   Potassium 3.5 - 5.2 mmol/L 4.9 4.0 3.8 4.3 4.0 4.6 4.3    - 224 IU/L 257(H) - 166 - 175 - 174   CEA ng/mL - - - - - - 5.4   Some recent data might be hidden     Lab Results   Component Value Date/Time    TSH 2.30 2019 03:29 AM    TSH 2.40 10/25/2019 07:39 PM    TSH 2.45 2019 04:16 AM       ALLERGY:  Allergies   Allergen Reactions    Cefepime Other (comments)     myoclonus        CURRENT MEDICATIONS:    Current Outpatient Medications:     tamsulosin (Flomax) 0.4 mg capsule, Take 0.8 mg by mouth nightly., Disp: , Rfl:     gentamicin (GARAMYCIN) 0.1 % topical ointment, Apply to exit site daily. , Disp: 30 g, Rfl: 0   amoxicillin-clavulanate (AUGMENTIN) 875-125 mg per tablet, Take 1 Tab by mouth two (2) times a day., Disp: 20 Tab, Rfl: 0    aspirin delayed-release 81 mg tablet, Take 1 Tab by mouth daily. , Disp: 90 Tab, Rfl: 3    venlafaxine-SR (EFFEXOR-XR) 150 mg capsule, Take 1 Cap by mouth daily (with breakfast). , Disp: 90 Cap, Rfl: 3    therapeutic multivitamin (THERAGRAN) tablet, Take 1 Tab by mouth daily. , Disp: 90 Tab, Rfl: 3    magnesium oxide (MAG-OX) 400 mg tablet, Take 2 Tabs by mouth two (2) times a day., Disp: 360 Tab, Rfl: 3    levETIRAcetam (KEPPRA) 250 mg tablet, Take 1 Tab by mouth two (2) times a day., Disp: 180 Tab, Rfl: 3    L. acidoph & paracasei- S therm- Bifido (TIAN-Q/RISAQUAD) 8 billion cell cap cap, Take 1 Cap by mouth daily. , Disp: 90 Cap, Rfl: 3    finasteride (PROSCAR) 5 mg tablet, Take 1 Tab by mouth daily. , Disp: 90 Tab, Rfl: 3    cholecalciferol (VITAMIN D3) (1000 Units /25 mcg) tablet, Take 2 Tabs by mouth daily. , Disp: 180 Tab, Rfl: 3    potassium chloride SR (KLOR-CON 10) 10 mEq tablet, Take 1 Tab by mouth daily. Take 1/2 tablet daily, Disp: 90 Tab, Rfl: 3    atorvastatin (LIPITOR) 40 mg tablet, Take 1 Tab by mouth daily. , Disp: 90 Tab, Rfl: 3    hydrALAZINE (APRESOLINE) 10 mg tablet, Take 1 Tab by mouth three (3) times daily. , Disp: 270 Tab, Rfl: 3    warfarin (COUMADIN) 3 mg tablet, Take 1 Tab by mouth daily. May take additional tab as directed by provider., Disp: 120 Tab, Rfl: 3    clonazePAM (KLONOPIN) 0.5 mg tablet, Take 0.5 Tabs by mouth two (2) times a day. Max Daily Amount: 0.5 mg., Disp: 30 Tab, Rfl: 0    sacubitril-valsartan (ENTRESTO) 24 mg/26 mg tablet, Take 0.5 Tabs by mouth two (2) times a day., Disp: 30 Tab, Rfl: 1    albuterol (PROVENTIL HFA, VENTOLIN HFA, PROAIR HFA) 90 mcg/actuation inhaler, Take 2 Puffs by inhalation every six (6) hours as needed for Wheezing., Disp: 1 Inhaler, Rfl: 3    warfarin (COUMADIN) 1 mg tablet, Take 3 Tabs by mouth daily.  May take additional tab as directed by provider., Disp: 100 Tab, Rfl: 0      Thank you for letting us see him with you,      Doug Arita, TIERRA  [address], [address] [place] [address]  Office [phone]  Fax [phone]

## 2020-03-25 NOTE — TELEPHONE ENCOUNTER
Called and spoke with patient's wife. Edvin Pina verbalized understanding to continue antibiotic until notified.  Refill sent to pharmacy by Marcelina Sena NP.

## 2020-03-30 NOTE — PROGRESS NOTES
Spoke with patient's wife. Sacha Gilbert verbalized understanding of Coumadin dosing and repeat INR in 1 week.

## 2020-03-31 NOTE — TELEPHONE ENCOUNTER
Called and informed patient's wife of approval by insurance for home INR monitoring. Isa Howe verbalized understanding to call [employer] ([phone], option [phone]) to set up training. [facility]icated [facility] nurse to coordinate training.

## 2020-04-03 NOTE — TELEPHONE ENCOUNTER
Telephone Call RE:  Appointment reminder     Outcome:     [x] Patient confirmed appointment   [] Patient rescheduled appointment for    [] Unable to reach   [] Left message              [] Other:       Lakesha Frey

## 2020-04-06 NOTE — TELEPHONE ENCOUNTER
Returned patient's call. He would like to check to see if  he could have a \"phone call appointment\" or if he needs to be seen in clinic.

## 2020-04-06 NOTE — TELEPHONE ENCOUNTER
Per Gilbert Elizalde - patient needs to keep his 9:45 appointment tomorrow. I spoke with him and he understood, says he will be here.

## 2020-04-07 NOTE — PROGRESS NOTES
[address] [facility] in [facility] HEALTHCARE  Office Visit       Patient name: Thu Castrejon  Patient : 1950  Patient MRN: [id number]  Date of service: 20    CHIEF COMPLAINT:  Chief Complaint   Patient presents with    Follow-up       HPI: Glory brenner 71y.o. year old white male with a history of HTN, HLD, JOSE, CAD s/p cardiac arrest VFib s/p CABG () c/b sternal would infection and sternectomy, ischemic cardiomyopathy LVEF 15-20%, s/p BiVICD  who underwent LVAD as DT on 2019 after bridging with Impella 5.0.  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 He was transferred to [facility] rehab on  and discharged from rehab on 2020. Echo Cortes returns to clinic today for LVAD follow up. A wound culture obtained from his drive line on 3/9 was positive for scant MSSA growth. He was started on augmentin BID and topical gentamicin with improvement in the appearance of his exit site and surrounding tissue. He has undergone serial CT scans to rule out abscess formation; per Dr. Breann Coates review, he has a small fluid collection that is not adjacent to the exit site, and may represent a tract from a former CT site. He denies any s/s of systemic infection including fever, chills, or rigors. From a HF standpoint, Echo Cortes is well compensated. He denies dyspnea or orthopnea. He has trace LE edema which is stable. His appetite is poor which is at his baseline. He has been progressing with PT/OT and walked from his car to our office today with a rollator. His weights have been stable and his PAd via CardioMEMs has ranged from 12-18 mmHg. He is compliant with is medications and accompanied today by his wife, Albertina Mccarty, who is his primary caregiver.  He denies dizziness, lightheadedness, CP, palpitations, N/V, abdominal distention, early satiety, bowel or bladder problems.      Assessment/Plan:   NYHA Class II    ICM - Stage D,  LVEF 15%, NYHA Class IV improved to NYHA Class II s/p HM3 LVAD as DT on 11/18/19 with Impella 5.0 as bridge to LVAD   Continue LVAD speed at 5800 rpm    Most recent CardioMEMS reading - 15 mmHg    Encouraged to increase PO fluid intake    Intolerant to BB d/t RV failure   Discontinue hydralazine 10 mg TID   Increase Entresto to 1 whole tab of 24/26 mg PO BID    Intolerant to spironolactone due to IVVD; reassess after increasing Entresto dose    Daily CardioMEMS monitoring   Dr. Kam Beach would like to pull sternal wire once stable - will need to wait until active infection has resolved    Follow up with LVAD clinic in 2 weeks via virtual visit; to be coordinated with [facility] RN   Repeat TTE once elective testing allowed     Chronic anticoagulation, goal INR 1.5-2.0   Continue warfarin and ASA    INR drawn in clinic today - 2.4   Please see tracker for details     MSSA drive line infection   Continue Augmentin 875/125 mg PO BID indefinitely   Gentamicin to exit site with daily dressing changes   CT x 2 reviewed by Dr. Kam Beach - small fluid collection adjacent to previous real drain tract; needs close monitoring    Recommend serial monitoring of CTs - repeat in ~4 weeks if possible    Continue daily dressing changes    Will continue OP management for now; discussed importance of compliance with regimen and potential for IP management    VT - s/p [place] BiVICD   No recent shocks   Follow up with EP as directed     COPD   Albuterol MRI prn    JOSE   Encouraged patient to be compliant with CPAP therapy   Follow up with Dr. Chuy Ruelas     Diverticulosis   Discontinue PPI, carafate   Monitor for GI bleeding     Recurrent UTI   On cipro x 6-12 months, possibly for life   Follows up with Dr. Aniyah Cheng and Proscar for now    Encouraged compliance with Flomax     Nutrition   Encouraged increased protein intake    Consider addition of appetite stimulant if appetite remains poor at next visit     Tremors   Resolved   Continue keppra 250 mg BID   Continue clonazepam 0.5 mg BID   Significant relief from klonopin - plan to wean keppra once symptoms improved with klonopin at next visit     BPH   On finasteride and tamsulosin    Depression   On Effexor - mg daily     Persistently elevated CEA   Persistently elevated CEA; PET scan shows increased activity RML of lung. Oncology consult appreciated - PET not suggestive of malignancy     Orthostatic Hypotension   Improved   Encourage use of Angel Hose    PAF   Off digoxin d/t tremors   Intolerant of BB d/t RV failure   On warfarin    LLE edema   Worse then right   Duplex negative for DVT    Debility   Improving   Continue PT/OT       CARDIAC IMAGING:  Chest CTA- no outflow graft occlusion   Pet Scan equivocal for amyloid  Send [employer]- pending     10/25/19   ECHO ADULT COMPLETE 01/29/2020 1/29/2020    Narrative · Severely dilated left ventricle. Upper normal wall thickness. Severe   systolic dysfunction. Estimated left ventricular ejection fraction is   <15%. Left ventricular diastolic dysfunction. · Mitral valve thickening. Minimal mitral valve prolapse of the anterior   mitral valve leaflet. Trace mitral valve regurgitation is present. · Mild tricuspid valve regurgitation is present. · Mildly dilated right ventricle. Moderately reduced RV systolic function   (TAPSE 1.3 cm, RV S' 6 cm/s). Pacer/ICD present. · Mildly biatrial enlargement  · Severely elevated central venous pressure (15+ mmHg); IVC diameter is   larger than 21 mm and collapses less than 50% with respiration. · LVAD inflow cannula spectral Doppler tracings not obtained.         Signed by: Lily Calderon MD          OTHER IMAGING:  Head CT negative for acute process  EEG suggestive of mild generalized encephalopathic process, possibly related to underlying structural brain injury vs metabolic abnormality    Visit Vitals  BP (!) 78/0 (BP 1 Location: Left arm, BP Patient Position: Sitting)   Pulse 68   Temp 97.6 °F (36.4 °C) (Oral)   Resp 20   Ht 6' 2\" (1.88 m)   Wt 183 lb 6.4 oz (83.2 kg)   SpO2 99%   BMI 23.55 kg/m²        LVAD   Pump Speed (RPM): 5800  Pump Flow (LPM): 5.4  PI (Pulsitility Index): 2.6  Power: 4.5  Back Up  at Bedside & Labeled: (P) Yes  Driveline Site Care: (P) Yes  Driveline Dressing: (P) Changed per order  Outpatient: (P) Yes  MAP in Therapeutic Range (Outpatient): (P) Yes  Testing  Alarms Reviewed: (P) Yes  Back up SC speed: (P) 5800  Back up Low Speed Limit: (P) 5400  Emergency Equipment with Patient?: (P) Yes  Emergency procedures reviewed?: (P) Yes  Drive line site inspected?: (P) Yes  Drive line intergrity inspected?: (P) Yes  Drive line dressing changed?: (P) Yes    Results for orders placed or performed in visit on 04/07/20   AMB PT/INR EXTERNAL   Result Value Ref Range    INR, External 2.4    TX INTERROGATION VAD IN PRSON W/PHYS/QHP ANALYSIS    Impression    Alarm history reviewed. Please see VAD flow sheet for readings. Rare PI events or adverse alarms noted. Settings adjusted; please see progress notes. Review of Systems   Constitutional: Negative for chills and fever. HENT: Negative for hearing loss. Eyes: Negative for blurred vision. Respiratory: Negative for cough, hemoptysis and shortness of breath. Cardiovascular: Positive for leg swelling. Negative for chest pain, palpitations, orthopnea and PND. Gastrointestinal: Negative for heartburn, nausea and vomiting. Genitourinary: Negative. Musculoskeletal: Negative for myalgias. Skin:        Fragile skin    Neurological: Positive for weakness. Negative for dizziness and headaches. Endo/Heme/Allergies: Does not bruise/bleed easily. Psychiatric/Behavioral: Negative for depression. Physical Exam   Constitutional: He is oriented to person, place, and time.  He appears well-developed and well-nourished. No distress. HENT:   Head: Normocephalic. Neck: Normal range of motion. Neck supple. No JVD present. Cardiovascular: Normal rate, regular rhythm and normal heart sounds. + VAD hum    Pulmonary/Chest: Effort normal and breath sounds normal. No respiratory distress. Abdominal: Soft. Bowel sounds are normal. He exhibits no distension. Musculoskeletal: Normal range of motion. General: Edema present. Neurological: He is alert and oriented to person, place, and time. Skin: Skin is warm and dry. L arm laceration and hematoma, general ecchymosis   Erythema and tenderness surrounding drive line exit site   Psychiatric: He has a normal mood and affect. Vitals reviewed.       PAST MEDICAL HISTORY:  Past Medical History:   Diagnosis Date    Degenerative disc disease, lumbar     Heart failure ([address]     High cholesterol     Hypertension     Paroxysmal atrial fibrillation ([facility] [address] 4/2/2019    Spinal stenosis        PAST SURGICAL HISTORY:  Past Surgical History:   Procedure Laterality Date    COLONOSCOPY Left 11/11/2019    COLONOSCOPY at bedside performed by Raman Moraes MD at [address] HX APPENDECTOMY      [address] GRAFT      triple    HX HEART ASSIST DEVICE Left 10/29/2019    Impella 5.0    HX HEART ASSIST DEVICE Left 11/18/2019    HeartMate 3    HX HERNIA REPAIR      HX IMPLANTABLE CARDIOVERTER DEFIBRILLATOR      HX THROMBECTOMY Left 11/25/2019    Left brachial thrombectomy    [place] CARDIOVERSION ELECTIVE ARRHYTHMIA EXTERNAL N/A 6/10/2019    EP CARDIOVERSION performed by William Barrera MD at [address] CATH LAB    [place] CARDIOVERSION ELECTIVE ARRHYTHMIA EXTERNAL N/A 6/18/2019    EP CARDIOVERSION performed by Marianela Arambula MD at [address] CATH LAB    [place] INSJ/RPLCMT PERM DFB W/TRNSVNS LDS 1/DUAL CHMBR N/A 6/21/2019    INSERT ICD BIV MULTI performed by Lorenzo Barksdale MD at [address] CATH LAB    [place] TCAT IMPL WRLS P-ART PRS SNR L-T HEMODYN MNTR N/A 2019    IMPLANT HEART FAILURE MONITORING DEVICE performed by Landon Espinoza MD at [address] [address] [facility]       FAMILY HISTORY:  Family History   Problem Relation Age of Onset    Lung Disease Mother     Hypertension Mother     Arthritis-osteo Mother     Heart Disease Mother     Heart Disease Father     Diabetes Father        SOCIAL HISTORY:  Social History     Socioeconomic History    Marital status:      Spouse name: Not on file    Number of children: Not on file    Years of education: Not on file    Highest education level: Not on file   Tobacco Use    Smoking status: Former Smoker     Last attempt to quit: 2010     Years since quittin.3    Smokeless tobacco: Never Used   Substance and Sexual Activity    Alcohol use: Not Currently     Comment: rarely    Drug use: Never   Other Topics Concern       LABORATORY RESULTS:     Labs Latest Ref Rng & Units 3/9/2020 2020   WBC 3.4 - 10.8 x10E3/uL 4.0 3.8(L)   RBC 4.14 - 5.80 x10E6/uL 3.52(L) 3.64(L)   Hemoglobin 13.0 - 17.7 g/dL 10. 5(L) 10. 8(L)   Hematocrit 37.5 - 51.0 % 32. 9(L) 36. 1(L)   MCV 79 - 97 fL 94 99. 2(H)   Platelets 144 - 270 R54G8/(L) 139(L)   Lymphocytes 12 - 49 % - 18   Monocytes 5 - 13 % - 10   Eosinophils 0 - 7 % - 3   Basophils 0 - 1 % - 1   Albumin 3.8 - 4.8 g/dL 3.5(L) 3.0(L)   Calcium 8.6 - 10.2 mg/dL 9.0 8.5   SGOT 0 - 40 IU/L 14 15   Glucose 65 - 99 mg/dL 81 84   BUN 8 - 27 mg/dL 26 20   Creatinine 0.76 - 1.27 mg/dL 1.06 1.09   Sodium 134 - 144 mmol/L 140 139   Potassium 3.5 - 5.2 mmol/L 4.9 4.0    - 224 IU/L 257(H) -   Some recent data might be hidden     Lab Results   Component Value Date/Time    TSH 2.30 2019 03:29 AM    TSH 2.40 10/25/2019 07:39 PM    TSH 2.45 2019 04:16 AM       ALLERGY:  Allergies   Allergen Reactions    Cefepime Other (comments)     myoclonus        CURRENT MEDICATIONS:    Current Outpatient Medications:     potassium chloride SR (KLOR-CON 10) 10 mEq tablet, Take 1 Tab by mouth daily. , Disp: 90 Tab, Rfl: 3    clonazePAM (KlonoPIN) 0.5 mg tablet, Take 0.5 Tabs by mouth two (2) times a day. Max Daily Amount: 0.5 mg., Disp: 30 Tab, Rfl: 0    sacubitriL-valsartan (Entresto) 24-26 mg tablet, Take 1 Tab by mouth two (2) times a day., Disp: 180 Tab, Rfl: 3    amoxicillin-clavulanate (AUGMENTIN) 875-125 mg per tablet, Take 1 Tab by mouth two (2) times a day., Disp: 40 Tab, Rfl: 2    tamsulosin (Flomax) 0.4 mg capsule, Take 0.8 mg by mouth nightly., Disp: , Rfl:     gentamicin (GARAMYCIN) 0.1 % topical ointment, Apply to exit site daily. , Disp: 30 g, Rfl: 0    aspirin delayed-release 81 mg tablet, Take 1 Tab by mouth daily. , Disp: 90 Tab, Rfl: 3    venlafaxine-SR (EFFEXOR-XR) 150 mg capsule, Take 1 Cap by mouth daily (with breakfast). , Disp: 90 Cap, Rfl: 3    therapeutic multivitamin (THERAGRAN) tablet, Take 1 Tab by mouth daily. , Disp: 90 Tab, Rfl: 3    magnesium oxide (MAG-OX) 400 mg tablet, Take 2 Tabs by mouth two (2) times a day., Disp: 360 Tab, Rfl: 3    levETIRAcetam (KEPPRA) 250 mg tablet, Take 1 Tab by mouth two (2) times a day., Disp: 180 Tab, Rfl: 3    L. acidoph & paracasei- S therm- Bifido (TIAN-Q/RISAQUAD) 8 billion cell cap cap, Take 1 Cap by mouth daily. , Disp: 90 Cap, Rfl: 3    finasteride (PROSCAR) 5 mg tablet, Take 1 Tab by mouth daily. , Disp: 90 Tab, Rfl: 3    cholecalciferol (VITAMIN D3) (1000 Units /25 mcg) tablet, Take 2 Tabs by mouth daily. , Disp: 180 Tab, Rfl: 3    atorvastatin (LIPITOR) 40 mg tablet, Take 1 Tab by mouth daily. , Disp: 90 Tab, Rfl: 3    warfarin (COUMADIN) 3 mg tablet, Take 1 Tab by mouth daily.  May take additional tab as directed by provider., Disp: 120 Tab, Rfl: 3    albuterol (PROVENTIL HFA, VENTOLIN HFA, PROAIR HFA) 90 mcg/actuation inhaler, Take 2 Puffs by inhalation every six (6) hours as needed for Wheezing., Disp: 1 Inhaler, Rfl: 3    warfarin (COUMADIN) 1 mg tablet, Take 3 Tabs by mouth daily.  May take additional tab as directed by provider., Disp: 100 Tab, Rfl: 0      Thank you for letting us see him with you,      Alejandro Hannon, NP  [address], [address] [place] [address]  Office [phone]  Fax [phone]

## 2020-04-07 NOTE — PATIENT INSTRUCTIONS
Medication changes:    STOP taking hydralazine. INCREASE Entresto to 1 whole tab, twice daily. Please take this with food (preferably protein). Your Entresto, potassium and clonazepam have been refilled. Please take this to your pharmacy to notify them of the change in medications. Testing Ordered: We checked your point of care INR today; your tracker (attached) has been updated. Other Recommendations:      Ensure your drinking an adequate amount of water with a goal of 6-8 eight ounce glasses (1.5-2 liters) of fluid daily. Your urine should be clear and light yellow straw colored. If you begin to experience dizziness or lightheadedness, please contact the [facility] at [phone]. Stay as active as possible. Please continue to check your CardioMEMS readings daily. Follow up in 2 weeks for a virtual visit with [facility]    Please monitor your blood pressures daily prior to medications and 2 hours after taking medications. Bring a written record of your blood pressures to your next appointment. Please monitor your weights daily upon waking and after using the bathroom. Keep a written records of your weights and bring to your next appointment. If you have a weight gain of 3 or more pounds overnight OR 5 or more pounds in one week please contact our office. Thank you for allowing us the privilege of being a part of your healthcare team! Please do not hesitate to contact our office at [phone] with any questions or concerns.

## 2020-04-09 NOTE — PROGRESS NOTES
Spoke with patient's wife. Jamee Manning verbalized understanding of Coumadin dosing and repeat INR in 1 week.

## 2020-04-13 NOTE — TELEPHONE ENCOUNTER
[phone] - returned patient's call. Patient's wife stated that she was concerned about patient. She stated that he slept all day yesterday. He was not as tired today, but worked with physical therapy and became dizzy. Stephanie Ronald stated he did not have any alarms but his PI was 11. She states he sat in his chair for a while and feels better now - no dizziness and PI is 2.2 now. HH has not been there so she is unaware of his MAP.  [phone]- called and left a message for patient to return call. [phone] - Patient's wife returned call. Stephanie Cardenas denies patient is having dark stools or nose bleeds. Stephanie Cardenas states patient is \"not a big water drinker\". Encouraged water intake. Called [address] who agreed to obtain a full set of labs and will call with MAP and assessment.

## 2020-04-16 NOTE — PROGRESS NOTES
Spoke with patient's wife. Henriette Ormond verbalized understanding of Coumadin dosing and repeat INR in 1 week.

## 2020-04-20 PROBLEM — A41.9 SEPSIS (HCC): Status: ACTIVE | Noted: 2020-01-01

## 2020-04-20 NOTE — PROGRESS NOTES
Admission Medication Reconciliation: 
 
Information obtained from:  chart review RxQuery data available¹:  YES Comments/Recommendations: All medications have been reviewed and updated; attempted to talk with patient, but no answer when calling the patient's room. Therefore, all medications reviewed via chart review; note, patient is followed by advanced heart failure clinic and has extensive documentation of medications. Changes made to Prior to Admission (PTA) Medication List: ? Medications Added:  
- budesonide, DuoNeb ? Medications Changed:  
- None ? Medications Removed:  
- None ¹RxQuery pharmacy benefit data reflects medications filled and processed through the patient's insurance, however  
this data does NOT capture whether the medication was picked up or is currently being taken by the patient. Allergies:  Cefepime Significant PMH/Disease States:  
Past Medical History:  
Diagnosis Date Degenerative disc disease, lumbar Heart failure ([facility] [address] High cholesterol Hypertension Paroxysmal atrial fibrillation ([facility] [address] 4/2/2019 Spinal stenosis Chief Complaint for this Admission: Chief Complaint Patient presents with Urinary Frequency Fever Shortness of Breath Prior to Admission Medications:  
Prior to Admission Medications Prescriptions Last Dose Informant Patient Reported? Taking? L. acidoph & paracasei- S therm- Bifido (TIAN-Q/RISAQUAD) 8 billion cell cap cap   No No  
Sig: Take 1 Cap by mouth daily. albuterol (PROVENTIL HFA, VENTOLIN HFA, PROAIR HFA) 90 mcg/actuation inhaler   No No  
Sig: Take 2 Puffs by inhalation every six (6) hours as needed for Wheezing. albuterol-ipratropium (DUO-NEB) 2.5 mg-0.5 mg/3 ml nebu   Yes Yes Sig: 3 mL by Nebulization route nightly. amoxicillin-clavulanate (AUGMENTIN) 875-125 mg per tablet   No No  
Sig: Take 1 Tab by mouth two (2) times a day.   
aspirin delayed-release 81 mg tablet   No No  
 Sig: Take 1 Tab by mouth daily. atorvastatin (LIPITOR) 40 mg tablet   No No  
Sig: Take 1 Tab by mouth daily. budesonide (PULMICORT) 0.5 mg/2 mL nbsp   Yes Yes Si mcg by Nebulization route every evening. cholecalciferol (VITAMIN D3) (1000 Units /25 mcg) tablet   No No  
Sig: Take 2 Tabs by mouth daily. clonazePAM (KlonoPIN) 0.5 mg tablet   No No  
Sig: Take 0.5 Tabs by mouth two (2) times a day. Max Daily Amount: 0.5 mg.  
finasteride (PROSCAR) 5 mg tablet   No No  
Sig: Take 1 Tab by mouth daily. gentamicin (GARAMYCIN) 0.1 % topical ointment   No No  
Sig: Apply to exit site daily. levETIRAcetam (KEPPRA) 250 mg tablet   No No  
Sig: Take 1 Tab by mouth two (2) times a day. magnesium oxide (MAG-OX) 400 mg tablet   No No  
Sig: Take 2 Tabs by mouth two (2) times a day. potassium chloride SR (KLOR-CON 10) 10 mEq tablet   No No  
Sig: Take 1 Tab by mouth daily. sacubitriL-valsartan (Entresto) 24-26 mg tablet   No No  
Sig: Take 1 Tab by mouth two (2) times a day. tamsulosin (Flomax) 0.4 mg capsule   Yes No  
Sig: Take 0.8 mg by mouth nightly. therapeutic multivitamin (THERAGRAN) tablet   No No  
Sig: Take 1 Tab by mouth daily. venlafaxine-SR (EFFEXOR-XR) 150 mg capsule   No No  
Sig: Take 1 Cap by mouth daily (with breakfast). warfarin (COUMADIN) 1 mg tablet   No No  
Sig: Take 3 Tabs by mouth daily. May take additional tab as directed by provider. warfarin (COUMADIN) 3 mg tablet   No No  
Sig: Take 1 Tab by mouth daily. May take additional tab as directed by provider. Facility-Administered Medications: None Thank you for allowing pharmacy to participate in the coordination of this patient's care. If you have any other questions, please contact the medication reconciliation pharmacist at [phone]. Dinora Beth., BCPS

## 2020-04-20 NOTE — PROGRESS NOTES
1600: TRANSFER - IN REPORT: 
 
Verbal report received from [place] RN(name) on [address]  being received from ED(unit) for routine progression of care Report consisted of patients Situation, Background, Assessment and  
Recommendations(SBAR). Information from the following report(s) SBAR, Kardex, Procedure Summary, Intake/Output, MAR and Recent Results was reviewed with the receiving nurse. Opportunity for questions and clarification was provided. Assessment completed upon patients arrival to unit and care assumed. 1645: S/w Hema Peterson NP regarding increased HR. Orders to monitor as pt receives IV fluids. 1800: Driveline dressing changed using sterile technique. Gentamycin ointment applied to site. No drainage noted. 1930: Bedside shift change report given to [facility] [place] (oncoming nurse) by Elodia Mcleod RN (offgoing nurse). Report included the following information SBAR, Kardex, Procedure Summary, Intake/Output, MAR and Recent Results.

## 2020-04-20 NOTE — PROGRESS NOTES
Problem: Falls - Risk of 
Goal: *Absence of Falls Description: Document Nathaly Welch Fall Risk and appropriate interventions in the flowsheet. Outcome: Progressing Towards Goal 
Note: Fall Risk Interventions: 
Mobility Interventions: OT consult for ADLs, Patient to call before getting OOB, PT Consult for mobility concerns, PT Consult for assist device competence Medication Interventions: Patient to call before getting OOB, Teach patient to arise slowly, Evaluate medications/consider consulting pharmacy Elimination Interventions: Call light in reach, Patient to call for help with toileting needs, Urinal in reach History of Falls Interventions: Investigate reason for fall Problem: Patient Education: Go to Patient Education Activity Goal: Patient/Family Education Outcome: Progressing Towards Goal 
  
Problem: Pressure Injury - Risk of 
Goal: *Prevention of pressure injury Description: Document Mich Scale and appropriate interventions in the flowsheet. Outcome: Progressing Towards Goal 
Note: Pressure Injury Interventions: 
Sensory Interventions: Minimize linen layers, Discuss PT/OT consult with provider, Keep linens dry and wrinkle-free, Maintain/enhance activity level Moisture Interventions: Absorbent underpads, Internal/External urinary devices, Minimize layers Activity Interventions: PT/OT evaluation, Pressure redistribution bed/mattress(bed type), Increase time out of bed Mobility Interventions: Pressure redistribution bed/mattress (bed type), PT/OT evaluation Nutrition Interventions: Document food/fluid/supplement intake, Offer support with meals,snacks and hydration Friction and Shear Interventions: Minimize layers, Lift sheet Problem: Patient Education: Go to Patient Education Activity Goal: Patient/Family Education Outcome: Progressing Towards Goal

## 2020-04-20 NOTE — ED PROVIDER NOTES
HPI  
 
Pt is a 70 yo M with a hx of CAD, HTN, CHF, paroxysmal afib and LVAD who presents to the ED today complaining of a 2 week history of progressively worsening malaise. Pt states \"I think I have a urinary tract infection\". Pt states he has been having a color change in his urine, increased urinary frequency and urinary incontinence, along with associated increased fatigue and SOB. Pt has had intermittent fevers at home that he has been treating with tylenol. He states he has had UTIs in the past and his sx feel similar. Pt denies any CP, cough, URI sx, n/v, abdominal pain, or changes in his bowel habits. He has been compliant with his Coumadin. He states he has been able to tolerate fluids at home, but his appetite has decreased. Past Medical History:  
Diagnosis Date  Degenerative disc disease, lumbar  Heart failure ([facility] [address]  High cholesterol  Hypertension  Paroxysmal atrial fibrillation ([facility] [address] 4/2/2019  Spinal stenosis Past Surgical History:  
Procedure Laterality Date  COLONOSCOPY Left 11/11/2019 COLONOSCOPY at bedside performed by Susanne Davis MD at [facility] 391  HX CORONARY ARTERY BYPASS GRAFT    
 triple  HX HEART ASSIST DEVICE Left 10/29/2019 Impella 5.0  
 HX HEART ASSIST DEVICE Left 11/18/2019 HeartMate 3  
 HX HERNIA REPAIR    
 HX IMPLANTABLE CARDIOVERTER DEFIBRILLATOR  HX THROMBECTOMY Left 11/25/2019 Left brachial thrombectomy  AK CARDIOVERSION ELECTIVE ARRHYTHMIA EXTERNAL N/A 6/10/2019 EP CARDIOVERSION performed by Hudson Clark MD at [place] [address], [address] CATH LAB  AK CARDIOVERSION ELECTIVE ARRHYTHMIA EXTERNAL N/A 6/18/2019 EP CARDIOVERSION performed by Getachew Gomez MD at [place] [address], [address] CATH LAB  AK INSJ/RPLCMT PERM DFB W/TRNSVNS LDS 1/DUAL CHMBR N/A 6/21/2019  INSERT ICD BIV MULTI performed by Andre Damon MD at [place] [address] [address] CATH LAB  
  MT TCAT IMPL WRLS P-ART PRS SNR L-T HEMODYN MNTR N/A 2019 IMPLANT HEART FAILURE MONITORING DEVICE performed by Landon Espinoza MD at [address], [facility] [address] [facility] Family History:  
Problem Relation Age of Onset  Lung Disease Mother  Hypertension Mother [facility] Arthritis-osteo Mother  Heart Disease Mother  Heart Disease Father  Diabetes Father Social History Socioeconomic History  Marital status:  Spouse name: Not on file  Number of children: Not on file  Years of education: Not on file  Highest education level: Not on file Occupational History  Not on file Social Needs  Financial resource strain: Not on file  Food insecurity Worry: Not on file Inability: Not on file  Transportation needs Medical: Not on file Non-medical: Not on file Tobacco Use  Smoking status: Former Smoker Last attempt to quit: 2010 Years since quittin.3  Smokeless tobacco: Never Used Substance and Sexual Activity  Alcohol use: Not Currently Comment: rarely  Drug use: Never  Sexual activity: Not on file Lifestyle  Physical activity Days per week: Not on file Minutes per session: Not on file  Stress: Not on file Relationships  Social connections Talks on phone: Not on file Gets together: Not on file Attends Mandaeism service: Not on file Active member of club or organization: Not on file Attends meetings of clubs or organizations: Not on file Relationship status: Not on file  Intimate partner violence Fear of current or ex partner: Not on file Emotionally abused: Not on file Physically abused: Not on file Forced sexual activity: Not on file Other Topics Concern [address] Service Not Asked  Blood Transfusions Not Asked  Caffeine Concern Not Asked  Occupational Exposure Not Asked Anju Gong Hazards Not Asked  Sleep Concern Not Asked  Stress Concern Not Asked  Weight Concern Not Asked  Special Diet Not Asked  Back Care Not Asked  Exercise Not Asked  Bike Helmet Not Asked  Seat Belt Not Asked  Self-Exams Not Asked Social History Narrative  Not on file ALLERGIES: Cefepime Review of Systems Constitutional: Positive for appetite change, fatigue and fever. Negative for chills. HENT: Negative for congestion and sore throat. Eyes: Negative for visual disturbance. Respiratory: Positive for shortness of breath. Negative for cough and wheezing. Cardiovascular: Negative for chest pain and palpitations. Gastrointestinal: Negative for abdominal distention, abdominal pain, constipation, diarrhea, nausea and vomiting. Endocrine: Negative. Genitourinary: Positive for frequency and urgency. Negative for difficulty urinating and dysuria. Musculoskeletal: Negative. Skin: Negative for rash. Neurological: Positive for weakness. Psychiatric/Behavioral: Negative for suicidal ideas. Vitals:  
 04/20/20 1125 BP: 126/84 Pulse: (!) 126 Resp: 25 Temp: (!) 100.6 °F (38.1 °C) SpO2: 100% Weight: 81.6 kg (180 lb) Height: 6' 2\" (1.88 m) Physical Exam 
Vitals signs and nursing note reviewed. Constitutional:   
   General: He is not in acute distress. Appearance: He is well-developed. HENT:  
   Head: Normocephalic and atraumatic. Eyes:  
   Conjunctiva/sclera: Conjunctivae normal.  
Neck: Musculoskeletal: Neck supple. Vascular: No JVD. Trachea: No tracheal deviation. Cardiovascular:  
   Rate and Rhythm: Tachycardia present. Heart sounds: No murmur. No friction rub. No gallop. Comments: +VAD hum Pulmonary:  
   Effort: Pulmonary effort is normal. Tachypnea present. No respiratory distress. Breath sounds: Normal breath sounds. No stridor. No wheezing. Chest:  
   Chest wall: No edema. Abdominal: General: Bowel sounds are normal. There is no distension. Palpations: Abdomen is soft. There is no mass. Tenderness: There is no abdominal tenderness. There is no guarding. Musculoskeletal: Normal range of motion. General: No tenderness. Comments: No deformity Skin: 
   General: Skin is warm and dry. Findings: No rash. Neurological:  
   Mental Status: He is alert and oriented to person, place, and time. Comments: No focal deficits Psychiatric:     
   Behavior: Behavior normal.     
   Thought Content: Thought content normal.     
   Judgment: Judgment normal.  
 
  
 
MDM Number of Diagnoses or Management Options Acute cystitis with hematuria:  
Diagnosis management comments: Pt with a hx of LVAD, presenting to the ED today for fever, increased fatigue, SOB, and urinary symptoms. Will check labs, blood cultures, UA to eval for UTI, pneumonia, metabolic abnormality, sepsis, anemia, dehydration, grant. Will consult LVAD team re: patient. Pt is tachycardic and has a fever at this time but appears clinically non-toxic. CONSULT NOTE:  
1300 Dr. Lu Sullivan spoke with Dr Thomasina Severs Specialty: Advanced Heart Failure Discussed pt's hx, disposition, and available diagnostic and imaging results. Reviewed care plans. Consultant agrees with plans as outlined. They note that patient is normally on long-term antibiotics, but that was recently stopped. They will admit patient to get an echo, start antibiotics. Progress note: 
1310 Updated patient on the POC. Pt is feeling better and has no further questions at this time. LABORATORY TESTS: 
Recent Results (from the past 12 hour(s)) EKG, 12 LEAD, INITIAL Collection Time: 04/20/20 11:35 AM  
Result Value Ref Range Ventricular Rate 121 BPM  
 Atrial Rate 141 BPM  
 QRS Duration 124 ms Q-T Interval 376 ms QTC Calculation (Bezet) 533 ms Calculated R Axis -63 degrees Calculated T Axis 141 degrees Diagnosis Ventricular-paced rhythm Biventricular pacemaker detected When compared with ECG of 22-DEC-2019 18:48, 
Vent. rate has increased BY  43 BPM 
  
CBC WITH AUTOMATED DIFF Collection Time: 04/20/20 11:46 AM  
Result Value Ref Range WBC 5.9 4.1 - 11.1 K/uL  
 RBC 3.19 (L) 4.10 - 5.70 M/uL HGB 9.6 (L) 12.1 - 17.0 g/dL HCT 30.4 (L) 36.6 - 50.3 % MCV 95.3 80.0 - 99.0 FL  
 MCH 30.1 26.0 - 34.0 PG  
 MCHC 31.6 30.0 - 36.5 g/dL  
 RDW 16.5 (H) 11.5 - 14.5 % PLATELET 610 (L) 312 - 400 K/uL MPV 9.6 8.9 - 12.9 FL  
 NRBC 0.0 0  WBC ABSOLUTE NRBC 0.00 0.00 - 0.01 K/uL NEUTROPHILS 89 (H) 32 - 75 % LYMPHOCYTES 5 (L) 12 - 49 % MONOCYTES 5 5 - 13 % EOSINOPHILS 1 0 - 7 % BASOPHILS 0 0 - 1 % IMMATURE GRANULOCYTES 0 0.0 - 0.5 % ABS. NEUTROPHILS 5.2 1.8 - 8.0 K/UL  
 ABS. LYMPHOCYTES 0.3 (L) 0.8 - 3.5 K/UL  
 ABS. MONOCYTES 0.3 0.0 - 1.0 K/UL  
 ABS. EOSINOPHILS 0.1 0.0 - 0.4 K/UL  
 ABS. BASOPHILS 0.0 0.0 - 0.1 K/UL  
 ABS. IMM. GRANS. 0.0 0.00 - 0.04 K/UL  
 DF SMEAR SCANNED    
 RBC COMMENTS ANISOCYTOSIS 1+ 
    
 RBC COMMENTS OVALOCYTES 2+ 
    
 RBC COMMENTS SABAS CELLS 1+ WBC COMMENTS RBC FRAGMENTS    
METABOLIC PANEL, COMPREHENSIVE Collection Time: 04/20/20 11:46 AM  
Result Value Ref Range Sodium 134 (L) 136 - 145 mmol/L Potassium 4.2 3.5 - 5.1 mmol/L Chloride 101 97 - 108 mmol/L  
 CO2 23 21 - 32 mmol/L Anion gap 10 5 - 15 mmol/L Glucose 133 (H) 65 - 100 mg/dL BUN 27 (H) 6 - 20 MG/DL Creatinine 1.40 (H) 0.70 - 1.30 MG/DL  
 BUN/Creatinine ratio 19 12 - 20 GFR est AA >60 >60 ml/min/1.73m2 GFR est non-AA 50 (L) >60 ml/min/1.73m2 Calcium 8.9 8.5 - 10.1 MG/DL Bilirubin, total 0.7 0.2 - 1.0 MG/DL  
 ALT (SGPT) 15 12 - 78 U/L  
 AST (SGOT) 16 15 - 37 U/L Alk. phosphatase 168 (H) 45 - 117 U/L Protein, total 7.3 6.4 - 8.2 g/dL Albumin 2.6 (L) 3.5 - 5.0 g/dL Globulin 4.7 (H) 2.0 - 4.0 g/dL A-G Ratio 0.6 (L) 1.1 - 2.2 SAMPLES BEING HELD Collection Time: 04/20/20 11:46 AM  
Result Value Ref Range SAMPLES BEING HELD 1RED,1BLU   
 COMMENT Add-on orders for these samples will be processed based on acceptable specimen integrity and analyte stability, which may vary by analyte. URINALYSIS W/ REFLEX CULTURE Collection Time: 04/20/20 11:54 AM  
Result Value Ref Range Color DARK YELLOW Appearance CLOUDY (A) CLEAR Specific gravity 1.018 1.003 - 1.030    
 pH (UA) 5.5 5.0 - 8.0 Protein 100 (A) NEG mg/dL Glucose Negative NEG mg/dL Ketone Negative NEG mg/dL Bilirubin Negative NEG Blood LARGE (A) NEG Urobilinogen 1.0 0.2 - 1.0 EU/dL Nitrites Negative NEG Leukocyte Esterase SMALL (A) NEG    
 WBC PENDING /hpf  
 RBC PENDING /hpf Epithelial cells PENDING /lpf Bacteria PENDING /hpf  
 UA:UC IF INDICATED PENDING URINE CULTURE HOLD SAMPLE Collection Time: 04/20/20 11:54 AM  
Result Value Ref Range Urine culture hold Urine on hold in Microbiology dept for 2 days. If unpreserved urine is submitted, it cannot be used for addtional testing after 24 hours, recollection will be required. LACTIC ACID Collection Time: 04/20/20 12:04 PM  
Result Value Ref Range Lactic acid 1.8 0.4 - 2.0 MMOL/L IMAGING RESULTS: 
XR CHEST PORT Final Result Impression: 1. Enlarged cardiac silhouette, diffuse interstitial prominence improved in the  
interval with no new consolidation MEDICATIONS GIVEN: 
Medications  
0.9% sodium chloride infusion (has no administration in time range) IMPRESSION: 
1. Acute cystitis with hematuria PLAN: 
1. Current Discharge Medication List  
  
 
2. Follow-up Information None Return to ED if worse

## 2020-04-20 NOTE — TELEPHONE ENCOUNTER
Spoke to patients wife she didn't know about logging in to [address] and doesn't know user name or password. I advised to try doxy. Phone was disconnected. I called back no answer. Called Agus Yanes and she will contact patient.

## 2020-04-20 NOTE — ROUTINE PROCESS
TRANSFER - OUT REPORT: 
 
Verbal report given to Ashley Heart RN (name) on Parvin Galan  being transferred to CV[facility] (unit) for routine progression of care Report consisted of patients Situation, Background, Assessment and  
Recommendations(SBAR). Information from the following report(s) SBAR, ED Summary, [facility] and Recent Results was reviewed with the receiving nurse. Lines: PICC Triple Lumen 31/11/78 Right;Basilic (Active) Peripheral IV 04/20/20 Right Antecubital (Active) Site Assessment Clean, dry, & intact 4/20/2020 12:03 PM  
Phlebitis Assessment 0 4/20/2020 12:03 PM  
Infiltration Assessment 0 4/20/2020 12:03 PM  
Dressing Status Clean, dry, & intact 4/20/2020 12:03 PM  
Dressing Type Transparent 4/20/2020 12:03 PM  
Hub Color/Line Status Patent; Flushed;Pink 4/20/2020 12:03 PM  
Action Taken Blood drawn 4/20/2020 12:03 PM  
  
 
Opportunity for questions and clarification was provided. Patient transported with: 
 Monitor Registered Nurse Oxygen at 4L [facility] LVAD tower

## 2020-04-20 NOTE — TELEPHONE ENCOUNTER
Returned call to patient's wife. (wife was able to leave a voicemail on Sunday 4/19/20 at 7:53PM). Message stated that patient was \"feeling bad all day with a fever of 100.8\", \"he has been urinating a lot\", \"I am going to give him tylenol\". Pete Wan who states this morning the patient is \"cold with chills and sitting in front of the fire to get warm\". States his temp this morning is 95.3, unable to get a pulse ox reading, weight is 180lbs which is down 2 lbs from yesterday. Reminded Angella Stoll to call the after hours pager from here on out.

## 2020-04-20 NOTE — PROGRESS NOTES
[address] [facility] in [place], [place] Virtual Visit Patient name: Ye Parson Patient : 1950 Patient MRN: [id number] Date of service: 20 CHIEF COMPLAINT: 
Chief Complaint Patient presents with  CHF  
 
 
HPI: Sona Kiser is a 71y.o. year old white male with a history of HTN, HLD, JOSE, CAD s/p cardiac arrest VFib s/p CABG () c/b sternal would infection and sternectomy, ischemic cardiomyopathy LVEF 15-20%, s/p BiVICD  who underwent LVAD as DT on 2019 after bridging with Impella 5.0.  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 He was transferred to [facility] rehab on  and discharged from rehab on 2020. Enoch Bermudez presents today for a virtual LVAD follow up visit. Over the weekend, he developed a fever as high as 100.9 and chills. This is associated with increased urinary frequency and dysuria with foul smelling and discolored urine. Enoch Bermudez has been treated with Cipro since  for pseudomonas urosepsis and resultant bacteremia. Per ID, he was to remain on Cipro for at least 6-12 months, possibly lifelong. According to the patient and his wife, his Cipro was stopped by Urology during a recent outpatient visit. His virtual visit was conducted with [facility] RN present, who obtained a MAP of 60 mmHg, temp 99.6 orally, and SpO2 89% with an intermittent pleth. Sona and his wife report stable drainage from his drive line exit site which has an MSSA drive line infection. He remains on Augmentin BID and topical gentamicin.   He has undergone serial CT scans to rule out abscess formation; per Dr. Kwaku Proctor review, he has a small fluid collection that is not adjacent to the exit site, and may represent a tract from a former CT site.No worsening erythema or tenderness. From a HF standpoint, Gail Em feels fatigued and mildly dyspneic. He denies CP, palpitations, nausea, vomiting, early satiety, LE edema. No VAD alarms. Until the development of his fever, he had been progressing with PT/OT. His weights have been stable and his PAd via CardioMEMs has ranged from 12-18 mmHg. Assessment/Plan: NYHA Class III Sepsis, suspect related to UTI (pseudomonas historically) Referred to ED for further evaluation Suspect progression of UTI since Cipro d/c'd Pan culture, check LA and PCT ID consult while IP Cont Proscar and Flomax ? Urology consult - Dr. Jasson Pruitt ICM - Stage D,  LVEF 15%, NYHA Class IV improved to NYHA Class III s/p HM3 LVAD as DT on 11/18/19 with Impella 5.0 as bridge to LVAD Continue LVAD speed at 5800 rpm  
 Most recent CardioMEMS reading - 15 mmHg Encouraged to increase PO fluid intake Intolerant to BB d/t RV failure Continue Entresto 1 whole tab of 24/26 mg PO BID Intolerant to spironolactone due to IVVD and hyponatremia Daily CardioMEMS monitoring Dr. Dwayne Lock would like to pull sternal wire once stable - will need to wait until active infection has resolved Follow up once discharged from hospital  
 
Chronic anticoagulation, goal INR 1.5-2.0 Continue warfarin and ASA Please see tracker for details MSSA drive line infection Continue Augmentin 875/125 mg PO BID indefinitely Gentamicin to exit site with daily dressing changes CT x 2 reviewed by Dr. Dwayne Lock - small fluid collection adjacent to previous real drain tract; needs close monitoring Repeat CT while IP Continue daily dressing changes VT - s/p [facility] BiVICD No recent shocks Follow up with EP as directed Interrogate upon admission COPD Albuterol MRI prn 
 
JOSE Encouraged patient to be compliant with CPAP therapy CPAP qHS while IP Follow up with Dr. Timbo Crowe Encouraged increased protein intake Tremors Resolved Continue keppra 250 mg BID Continue clonazepam 0.5 mg BID Significant relief from klonopin - plan to wean keppra once symptoms improved with klonopin - trial as IP  
 
BPH On finasteride and tamsulosin Depression On Effexor - mg daily Persistently elevated CEA Persistently elevated CEA; PET scan shows increased activity RML of lung. Oncology consult appreciated - PET not suggestive of malignancy Orthostatic Hypotension Improved Encourage use of [employer] PAF Off digoxin d/t tremors Intolerant of BB d/t RV failure On warfarin LLE edema Worse then right Duplex negative for DVT Debility Improving Continue PT/OT  
 
 
CARDIAC IMAGING: 
Chest CTA- no outflow graft occlusion Pet Scan equivocal for amyloid Send [facility]- pending 10/25/19 ECHO ADULT COMPLETE 01/29/2020 1/29/2020 Narrative · Severely dilated left ventricle. Upper normal wall thickness. Severe  
systolic dysfunction. Estimated left ventricular ejection fraction is  
<15%. Left ventricular diastolic dysfunction. · Mitral valve thickening. Minimal mitral valve prolapse of the anterior  
mitral valve leaflet. Trace mitral valve regurgitation is present. · Mild tricuspid valve regurgitation is present. · Mildly dilated right ventricle. Moderately reduced RV systolic function (TAPSE 1.3 cm, RV S' 6 cm/s). Pacer/ICD present. · Mildly biatrial enlargement · Severely elevated central venous pressure (15+ mmHg); IVC diameter is  
larger than 21 mm and collapses less than 50% with respiration. · LVAD inflow cannula spectral Doppler tracings not obtained. Signed by: Bob Cornell MD  
 
 
  
OTHER IMAGING: 
Head CT negative for acute process EEG suggestive of mild generalized encephalopathic process, possibly related to underlying structural brain injury vs metabolic abnormality Review of Systems Constitutional: Positive for chills, fever, malaise/fatigue and weight loss. HENT: Positive for hearing loss. Eyes: Negative. Respiratory: Negative. Cardiovascular: Negative. Gastrointestinal: Negative. Genitourinary: Positive for dysuria, frequency, hematuria and urgency. Musculoskeletal: Negative. Skin: Negative. Neurological: Positive for weakness. Endo/Heme/Allergies: Bruises/bleeds easily. Psychiatric/Behavioral: Negative. Visit Vitals BP (!) 60/0 Pulse (P) 91 Temp (P) 99.6 °F (37.6 °C) (Oral) Ht (P) 6' 2\" (1.88 m) Wt (P) 180 lb (81.6 kg) SpO2 (!) (P) 89% Comment: patient was advised to wear oxygen BMI (P) 23.11 kg/m² No results found for this visit on 04/20/20. Physical Exam  
Constitutional: He is oriented to person, place, and time. He appears well-developed and well-nourished. He appears ill. HENT:  
Head: Normocephalic. Neck: Normal range of motion. Pulmonary/Chest: Effort normal. No respiratory distress. Neurological: He is alert and oriented to person, place, and time. Psychiatric: He has a normal mood and affect. Vitals reviewed. PAST MEDICAL HISTORY: 
Past Medical History:  
Diagnosis Date  Degenerative disc disease, lumbar  Heart failure ([address]  High cholesterol  Hypertension  Paroxysmal atrial fibrillation ([address] 4/2/2019  Spinal stenosis PAST SURGICAL HISTORY: 
Past Surgical History:  
Procedure Laterality Date  COLONOSCOPY Left 11/11/2019 COLONOSCOPY at bedside performed by Tomas Hussein MD at [address]  HX CORONARY ARTERY BYPASS GRAFT    
 triple  HX HEART ASSIST DEVICE Left 10/29/2019 Impella 5.0  
 HX HEART ASSIST DEVICE Left 11/18/2019 HeartMate 3  
 HX HERNIA REPAIR    
 HX IMPLANTABLE CARDIOVERTER DEFIBRILLATOR  HX THROMBECTOMY Left 11/25/2019 Left brachial thrombectomy  [place] CARDIOVERSION ELECTIVE ARRHYTHMIA EXTERNAL N/A 6/10/2019 EP CARDIOVERSION performed by Marlon Huang MD at [facility] [address], [facility] [address] CATH LAB  [place] CARDIOVERSION ELECTIVE ARRHYTHMIA EXTERNAL N/A 2019 EP CARDIOVERSION performed by Shahbaz hCeatham MD at [facility] [place] [address], [facility] CATH LAB  [place] INSJ/RPLCMT PERM DFB W/TRNSVNS LDS 1/DUAL CHMBR N/A 2019 INSERT ICD BIV MULTI performed [facility] N[facility] [address] CATH LAB  [place] TCAT IMPL WRLS P-ART PRS SNR L-T HEMODYN MNTR N/A 2019 IMPLANT HEART FAILURE MONITORING DEVICE performed by Tyshawn Stinson MD at [facility] [place] [address], [facility] CATH LAB FAMILY HISTORY: 
Family History Problem Relation Age of Onset  Lung Disease Mother  Hypertension Mother [employer] Arthritis-osteo Mother  Heart Disease Mother  Heart Disease Father  Diabetes Father SOCIAL HISTORY: 
Social History Socioeconomic History  Marital status:  Spouse name: Not on file  Number of children: Not on file  Years of education: Not on file  Highest education level: Not on file Tobacco Use  Smoking status: Former Smoker Last attempt to quit: 2010 Years since quittin.3  Smokeless tobacco: Never Used Substance and Sexual Activity  Alcohol use: Not Currently Comment: rarely  Drug use: Never Other Topics Concern LABORATORY RESULTS: 
  
Labs Latest Ref Rng & Units 3/9/2020 2020 WBC 3.4 - 10.8 x10E3/uL 4.0 3.8(L)  
RBC 4.14 - 5.80 x10E6/uL 3.52(L) 3.64(L) Hemoglobin 13.0 - 17.7 g/dL 10. 5(L) 10. 8(L) Hematocrit 37.5 - 51.0 % 32. 9(L) 36. 1(L) MCV 79 - 97 fL 94 99. 2(H) Platelets 627 - 382 G74T1/(L) 139(L) Lymphocytes 12 - 49 % - 18 Monocytes 5 - 13 % - 10 Eosinophils 0 - 7 % - 3 Basophils 0 - 1 % - 1 Albumin 3.8 - 4.8 g/dL 3.5(L) 3.0(L) Calcium 8.6 - 10.2 mg/dL 9.0 8.5 SGOT 0 - 40 IU/L 14 15 Glucose 65 - 99 mg/dL 81 84 BUN 8 - 27 mg/dL 26 20 Creatinine 0.76 - 1.27 mg/dL 1.06 1.09  
 Sodium 134 - 144 mmol/L 140 139 Potassium 3.5 - 5.2 mmol/L 4.9 4.0  - 224 IU/L 257(H) - Some recent data might be hidden Lab Results Component Value Date/Time TSH 2.30 12/03/2019 03:29 AM  
 TSH 2.40 10/25/2019 07:39 PM  
 TSH 2.45 06/01/2019 04:16 AM  
 
 
ALLERGY: 
Allergies Allergen Reactions  Cefepime Other (comments)  
  myoclonus CURRENT MEDICATIONS: 
 
Current Outpatient Medications:  
  potassium chloride SR (KLOR-CON 10) 10 mEq tablet, Take 1 Tab by mouth daily. , Disp: 90 Tab, Rfl: 3 
  clonazePAM (KlonoPIN) 0.5 mg tablet, Take 0.5 Tabs by mouth two (2) times a day. Max Daily Amount: 0.5 mg., Disp: 30 Tab, Rfl: 0 
  sacubitriL-valsartan (Entresto) 24-26 mg tablet, Take 1 Tab by mouth two (2) times a day., Disp: 180 Tab, Rfl: 3 
  amoxicillin-clavulanate (AUGMENTIN) 875-125 mg per tablet, Take 1 Tab by mouth two (2) times a day., Disp: 40 Tab, Rfl: 2 
  tamsulosin (Flomax) 0.4 mg capsule, Take 0.8 mg by mouth nightly., Disp: , Rfl:  
  gentamicin (GARAMYCIN) 0.1 % topical ointment, Apply to exit site daily. , Disp: 30 g, Rfl: 0 
  aspirin delayed-release 81 mg tablet, Take 1 Tab by mouth daily. , Disp: 90 Tab, Rfl: 3 
  venlafaxine-SR (EFFEXOR-XR) 150 mg capsule, Take 1 Cap by mouth daily (with breakfast). , Disp: 90 Cap, Rfl: 3 
  therapeutic multivitamin (THERAGRAN) tablet, Take 1 Tab by mouth daily. , Disp: 90 Tab, Rfl: 3 
  magnesium oxide (MAG-OX) 400 mg tablet, Take 2 Tabs by mouth two (2) times a day., Disp: 360 Tab, Rfl: 3 
  levETIRAcetam (KEPPRA) 250 mg tablet, Take 1 Tab by mouth two (2) times a day., Disp: 180 Tab, Rfl: 3 
  finasteride (PROSCAR) 5 mg tablet, Take 1 Tab by mouth daily. , Disp: 90 Tab, Rfl: 3   cholecalciferol (VITAMIN D3) (1000 Units /25 mcg) tablet, Take 2 Tabs by mouth daily. , Disp: 180 Tab, Rfl: 3 
  atorvastatin (LIPITOR) 40 mg tablet, Take 1 Tab by mouth daily. , Disp: 90 Tab, Rfl: 3   warfarin (COUMADIN) 3 mg tablet, Take 1 Tab by mouth daily. May take additional tab as directed by provider., Disp: 120 Tab, Rfl: 3 
  warfarin (COUMADIN) 1 mg tablet, Take 3 Tabs by mouth daily. May take additional tab as directed by provider., Disp: 100 Tab, Rfl: 0 
  L. acidoph & paracasei- S therm- Bifido (TIAN-Q/RISAQUAD) 8 billion cell cap cap, Take 1 Cap by mouth daily. , Disp: 90 Cap, Rfl: 3 
  albuterol (PROVENTIL HFA, VENTOLIN HFA, PROAIR HFA) 90 mcg/actuation inhaler, Take 2 Puffs by inhalation every six (6) hours as needed for Wheezing., Disp: 1 Inhaler, Rfl: 3 Consent: Starla Campos, who was seen by synchronous (real-time) audio-video technology, and/or his healthcare decision maker, is aware that this patient-initiated, Telehealth encounter on 4/20/2020 is a billable service, with coverage as determined by his insurance carrier. He is aware that he may receive a bill and has provided verbal consent to proceed: Yes. I spent at least 15 minutes with this established patient, and >50% of the time was spent counseling and/or coordinating care regarding s/s to report, plan of care [address] We discussed the expected course, resolution and complications of the diagnosis(es) in detail. Medication risks, benefits, costs, interactions, and alternatives were discussed as indicated. I advised him to contact the office if his condition worsens, changes or fails to improve as anticipated. He expressed understanding with the diagnosis(es) and plan. Starla Campos is a 71 y.o. male being evaluated by a video visit encounter for concerns as above. A caregiver was present when appropriate. Due to this being a TeleHealth encounter (During [place] [address] public health emergency), evaluation of the following organ systems was limited: Vitals/Constitutional/EENT/Resp/CV/GI//MS/Neuro/Skin/Heme-Lymph-Imm.  
Pursuant to the emergency declaration under the [employer] and the National Emergencies Act, [facility] waiver authority and the [employer] General Act, this Virtual  Visit was conducted, with patient's (and/or legal guardian's) consent, to reduce the patient's risk of exposure to COVID-19 and provide necessary medical care. Services were provided through a video synchronous discussion virtually to substitute for in-person clinic visit. Patient and provider were located at their individual homes. Thank you for letting us see him with you, TIERRA Dodd [address] [place] [address], [address] Office [phone] Fax [phone]

## 2020-04-20 NOTE — H&P
[address] [facility] in [place], [place] 
H/P Patient name: Christianne Handley Patient : 1950 Patient MRN: [id number] Date of service: 20 CHIEF COMPLAINT: 
Chief Complaint Patient presents with  Urinary Frequency  Fever  Shortness of Breath HPI: Scarlett Kiser is a 71y.o. year old white male with a history of HTN, HLD, JOSE, CAD s/p cardiac arrest VFib s/p CABG () c/b sternal would infection and sternectomy, ischemic cardiomyopathy LVEF 15-20%, s/p BiVICD  who underwent LVAD as DT on 2019 after bridging with Impella 5.0.  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 He was transferred to [facility] rehab on  and discharged from rehab on 2020. Tiffany Win presented today for a virtual LVAD follow up visit. Over the weekend, he developed a fever as high as 100.9 and chills. This is associated with increased urinary frequency and dysuria with foul smelling and discolored urine. Tiffany Win has been treated with Cipro since  for pseudomonas urosepsis and resultant bacteremia. Per ID, he was to remain on Cipro for at least 6-12 months, possibly lifelong. According to the patient and his wife, his Cipro was stopped by Urology during a recent outpatient visit. He was referred to the ED, evaluated and will be admitted to [facility] [place]. His virtual visit was conducted with Noah Ortega RN present, who obtained a MAP of 60 mmHg, temp 99.6 orally, and SpO2 89% with an intermittent pleth. Sona and his wife report stable drainage from his drive line exit site which has an MSSA drive line infection. He remains on Augmentin BID and topical gentamicin.   He has undergone serial CT scans to rule out abscess formation; per Dr. Davian Hagan review, he has a small fluid collection that is not adjacent to the exit site, and may represent a tract from a former CT site. No worsening erythema or tenderness. From a HF standpoint, Lara Parra feels fatigued and mildly dyspneic. He denies CP, palpitations, nausea, vomiting, early satiety, LE edema. No VAD alarms. Until the development of his fever, he had been progressing with PT/OT. His weights have been stable and his PAd via CardioMEMs has ranged from 12-18 mmHg. Assessment/Plan: NYHA Class III Sepsis, suspect related to UTI (pseudomonas historically) Admit to [facility] Suspect progression of UTI since Cipro d/c'd Pan culture pending LA- 1.8 Trend PCT ID consult while IP Cont Proscar and Flomax ? Urology consult - Dr. Kalina Dickson Resume meropenem IVF for now ICM - Stage D,  LVEF 15%, NYHA Class IV improved to NYHA Class III s/p HM3 LVAD as DT on 11/18/19 with Impella 5.0 as bridge to LVAD Continue LVAD speed at 5800 rpm  
 Most recent CardioMEMS reading - 15 mmHg Repeat TTE Encouraged to increase PO fluid intake Intolerant to BB d/t RV failure Continue Entresto 1 whole tab of 24/26 mg PO BID Intolerant to spironolactone due to IVVD and hyponatremia Daily CardioMEMS monitoring Dr. Schuler Ahr would like to pull sternal wire once stable - will need to wait until active infection has resolved Follow up once discharged from hospital  
 
Chronic anticoagulation, goal INR 1.5-2.0 Continue warfarin and ASA MSSA drive line infection Continue Augmentin 875/125 mg PO BID indefinitely Gentamicin to exit site with daily dressing changes CT x 2 reviewed by Dr. Schuler Ahr - small fluid collection adjacent to previous real drain tract; needs close monitoring Repeat CT while IP- ordered Continue daily dressing changes VT - s/p [facility] BiVICD No recent shocks Follow up with EP as directed Interrogate upon admission COPD Albuterol MRI prn 
 
JOSE Encouraged patient to be compliant with CPAP therapy CPAP qHS while IP Follow up with Dr. Paz Neumann Encouraged increased protein intake Tremors Resolved Continue keppra 250 mg BID Continue clonazepam 0.5 mg BID Significant relief from klonopin - plan to wean keppra once symptoms improved with klonopin - trial as IP  
 
BPH On finasteride and tamsulosin Depression On Effexor - mg daily Persistently elevated CEA Persistently elevated CEA; PET scan shows increased activity RML of lung. Oncology consult appreciated - PET not suggestive of malignancy Orthostatic Hypotension Improved Encourage use of [employer] PAF Off digoxin d/t tremors Intolerant of BB d/t RV failure On warfarin Debility Improving Continue PT/OT while inpatient CARDIAC IMAGING: 
Chest CTA- no outflow graft occlusion Pet Scan equivocal for amyloid Send [facility]- pending 10/25/19 ECHO ADULT COMPLETE 01/29/2020 1/29/2020 Narrative · Severely dilated left ventricle. Upper normal wall thickness. Severe  
systolic dysfunction. Estimated left ventricular ejection fraction is  
<15%. Left ventricular diastolic dysfunction. · Mitral valve thickening. Minimal mitral valve prolapse of the anterior  
mitral valve leaflet. Trace mitral valve regurgitation is present. · Mild tricuspid valve regurgitation is present. · Mildly dilated right ventricle. Moderately reduced RV systolic function (TAPSE 1.3 cm, RV S' 6 cm/s). Pacer/ICD present. · Mildly biatrial enlargement · Severely elevated central venous pressure (15+ mmHg); IVC diameter is  
larger than 21 mm and collapses less than 50% with respiration. · LVAD inflow cannula spectral Doppler tracings not obtained. Signed by: Marian Russell MD  
 
  
  
OTHER IMAGING: 
Head CT negative for acute process EEG suggestive of mild generalized encephalopathic process, possibly related to underlying structural brain injury vs metabolic abnormality Review of Systems Constitutional: Positive for chills, fever, malaise/fatigue and weight loss. HENT: Positive for hearing loss. Eyes: Negative. Respiratory: Negative. Cardiovascular: Negative. Gastrointestinal: Negative. Genitourinary: Positive for dysuria, frequency, hematuria and urgency. Musculoskeletal: Negative. Skin: Negative. Neurological: Positive for weakness. Endo/Heme/Allergies: Bruises/bleeds easily. Psychiatric/Behavioral: Negative. Visit Vitals /84 (BP 1 Location: Left arm, BP Patient Position: At rest) Pulse (!) 119 Temp (!) 100.5 °F (38.1 °C) Resp 30 Ht 6' 2\" (1.88 m) Wt 180 lb (81.6 kg) SpO2 95% BMI 23.11 kg/m² LVAD Driveline Site Care: No 
Driveline Dressing: Clean, Dry, and Intact Results for orders placed or performed during the hospital encounter of 04/20/20 URINE CULTURE HOLD SAMPLE Result Value Ref Range Urine culture hold Urine on hold in Microbiology dept for 2 days. If unpreserved urine is submitted, it cannot be used for addtional testing after 24 hours, recollection will be required. XR CHEST PORT Narrative INDICATION:  SOB, fatigue, fever EXAM: Single portable view of chest 1201 . Comparison:1/30/2020 Findings: Cardiac silhouette is enlarged. AICD and left ventricular assist 
device unchanged. Pulmonary vasculature is not engorged. Interstitial prominence 
has improved in the interval. Given overlying hardware no new consolidation. No 
pneumothorax or effusion Impression Impression: 1. Enlarged cardiac silhouette, diffuse interstitial prominence improved in the 
interval with no new consolidation CBC WITH AUTOMATED DIFF Result Value Ref Range WBC 5.9 4.1 - 11.1 K/uL  
 RBC 3.19 (L) 4.10 - 5.70 M/uL HGB 9.6 (L) 12.1 - 17.0 g/dL HCT 30.4 (L) 36.6 - 50.3 % MCV 95.3 80.0 - 99.0 FL  
 MCH 30.1 26.0 - 34.0 PG  
 MCHC 31.6 30.0 - 36.5 g/dL  
 RDW 16.5 (H) 11.5 - 14.5 % PLATELET 881 (L) 322 - 400 K/uL MPV 9.6 8.9 - 12.9 FL  
 NRBC 0.0 0  WBC ABSOLUTE NRBC 0.00 0.00 - 0.01 K/uL NEUTROPHILS 89 (H) 32 - 75 % LYMPHOCYTES 5 (L) 12 - 49 % MONOCYTES 5 5 - 13 % EOSINOPHILS 1 0 - 7 % BASOPHILS 0 0 - 1 % IMMATURE GRANULOCYTES 0 0.0 - 0.5 % ABS. NEUTROPHILS 5.2 1.8 - 8.0 K/UL  
 ABS. LYMPHOCYTES 0.3 (L) 0.8 - 3.5 K/UL  
 ABS. MONOCYTES 0.3 0.0 - 1.0 K/UL  
 ABS. EOSINOPHILS 0.1 0.0 - 0.4 K/UL  
 ABS. BASOPHILS 0.0 0.0 - 0.1 K/UL  
 ABS. IMM. GRANS. 0.0 0.00 - 0.04 K/UL  
 DF SMEAR SCANNED    
 RBC COMMENTS ANISOCYTOSIS 1+ 
    
 RBC COMMENTS OVALOCYTES 2+ 
    
 RBC COMMENTS SABAS CELLS 1+ WBC COMMENTS RBC FRAGMENTS    
METABOLIC PANEL, COMPREHENSIVE Result Value Ref Range Sodium 134 (L) 136 - 145 mmol/L Potassium 4.2 3.5 - 5.1 mmol/L Chloride 101 97 - 108 mmol/L  
 CO2 23 21 - 32 mmol/L Anion gap 10 5 - 15 mmol/L Glucose 133 (H) 65 - 100 mg/dL BUN 27 (H) 6 - 20 MG/DL Creatinine 1.40 (H) 0.70 - 1.30 MG/DL  
 BUN/Creatinine ratio 19 12 - 20 GFR est AA >60 >60 ml/min/1.73m2 GFR est non-AA 50 (L) >60 ml/min/1.73m2 Calcium 8.9 8.5 - 10.1 MG/DL Bilirubin, total 0.7 0.2 - 1.0 MG/DL  
 ALT (SGPT) 15 12 - 78 U/L  
 AST (SGOT) 16 15 - 37 U/L Alk. phosphatase 168 (H) 45 - 117 U/L Protein, total 7.3 6.4 - 8.2 g/dL Albumin 2.6 (L) 3.5 - 5.0 g/dL Globulin 4.7 (H) 2.0 - 4.0 g/dL A-G Ratio 0.6 (L) 1.1 - 2.2 SAMPLES BEING HELD Result Value Ref Range SAMPLES BEING HELD 1RED,1BLU   
 COMMENT Add-on orders for these samples will be processed based on acceptable specimen integrity and analyte stability, which may vary by analyte. URINALYSIS W/ REFLEX CULTURE Result Value Ref Range Color DARK YELLOW Appearance CLOUDY (A) CLEAR Specific gravity 1.018 1.003 - 1.030    
 pH (UA) 5.5 5.0 - 8.0 Protein 100 (A) NEG mg/dL Glucose Negative NEG mg/dL Ketone Negative NEG mg/dL Bilirubin Negative NEG Blood LARGE (A) NEG Urobilinogen 1.0 0.2 - 1.0 EU/dL Nitrites Negative NEG Leukocyte Esterase SMALL (A) NEG    
 WBC 10-20 0 - 4 /hpf  
 RBC 10-20 0 - 5 /hpf Epithelial cells FEW FEW /lpf Bacteria 1+ (A) NEG /hpf  
 UA:UC IF INDICATED URINE CULTURE ORDERED (A) CNI Budding yeast PRESENT (A) NEG    
LACTIC ACID Result Value Ref Range Lactic acid 1.8 0.4 - 2.0 MMOL/L  
PROTHROMBIN TIME + INR Result Value Ref Range INR 1.9 (H) 0.9 - 1.1 Prothrombin time 18.7 (H) 9.0 - 11.1 sec EKG, 12 LEAD, INITIAL Result Value Ref Range Ventricular Rate 121 BPM  
 Atrial Rate 141 BPM  
 QRS Duration 124 ms Q-T Interval 376 ms QTC Calculation (Bezet) 533 ms Calculated R Axis -63 degrees Calculated T Axis 141 degrees Diagnosis Ventricular-paced rhythm Biventricular pacemaker detected When compared with ECG of 22-DEC-2019 18:48, 
Vent. rate has increased BY  43 BPM 
  
  
 
Physical Exam  
Constitutional: He is oriented to person, place, and time. He appears well-developed and well-nourished. He appears ill. No distress. HENT:  
Head: Normocephalic. Eyes: Pupils are equal, round, and reactive to light. Neck: Normal range of motion. No JVD present. Cardiovascular: Regular rhythm and normal heart sounds. Tachycardia present. + VAD sounds Pulmonary/Chest: Effort normal. No respiratory distress. Abdominal: Soft. Bowel sounds are normal. He exhibits no distension. Musculoskeletal: Normal range of motion. General: No edema. Neurological: He is alert and oriented to person, place, and time. Skin: Skin is warm. He is diaphoretic. Psychiatric: He has a normal mood and affect. Nursing note and vitals reviewed. PAST MEDICAL HISTORY: 
Past Medical History:  
Diagnosis Date  Degenerative disc disease, lumbar  Heart failure ([address]  High cholesterol  Hypertension  Paroxysmal atrial fibrillation ([facility] [address] 2019  Spinal stenosis PAST SURGICAL HISTORY: 
Past Surgical History:  
Procedure Laterality Date  COLONOSCOPY Left 2019 COLONOSCOPY at bedside performed by Jessica Hightower MD at [address]  HX CORONARY ARTERY BYPASS GRAFT    
 triple  HX HEART ASSIST DEVICE Left 10/29/2019 Impella 5.0  
 HX HEART ASSIST DEVICE Left 2019 HeartMate 3  
 HX HERNIA REPAIR    
 HX IMPLANTABLE CARDIOVERTER DEFIBRILLATOR  HX THROMBECTOMY Left 2019 Left brachial thrombectomy  KS CARDIOVERSION ELECTIVE ARRHYTHMIA EXTERNAL N/A 6/10/2019 EP CARDIOVERSION performed by Katherine Stinson MD at [place] [address], [facility] [address] CATH LAB  KS CARDIOVERSION ELECTIVE ARRHYTHMIA EXTERNAL N/A 2019 EP CARDIOVERSION performed by Lexis Cifuentes MD at [place] [address], [facility] [address] CATH LAB  KS INSJ/RPLCMT PERM DFB W/TRNSVNS LDS 1/DUAL CHMBR N/A 2019 INSERT ICD BIV MULTI performed by Jesus Pereyra MD at [place] [address], [facility] CATH LAB  KS TCAT IMPL WRLS P-ART PRS SNR L-T HEMODYN MNTR N/A 2019 IMPLANT HEART FAILURE MONITORING DEVICE performed by Khushboo Lang MD at [place] [address], [facility] [address] CATH LAB FAMILY HISTORY: 
Family History Problem Relation Age of Onset  Lung Disease Mother  Hypertension Mother [facility] Arthritis-osteo Mother  Heart Disease Mother  Heart Disease Father  Diabetes Father SOCIAL HISTORY: 
Social History Socioeconomic History  Marital status:  Spouse name: Not on file  Number of children: Not on file  Years of education: Not on file  Highest education level: Not on file Tobacco Use  Smoking status: Former Smoker Last attempt to quit: 2010 Years since quittin.3  Smokeless tobacco: Never Used Substance and Sexual Activity  Alcohol use: Not Currently Comment: rarely  Drug use: Never Other Topics Concern ALLERGY: 
Allergies Allergen Reactions  Cefepime Other (comments)  
  myoclonus CURRENT MEDICATIONS: 
 
Current Facility-Administered Medications:  
  0.9% sodium chloride infusion, 75 mL/hr, IntraVENous, CONTINUOUS, Scarlett Lopez DO 
  albuterol-ipratropium (DUO-NEB) 2.5 MG-0.5 MG/3 ML, 3 mL, Nebulization, Q6H PRN, Shana Sims B, NP 
  amoxicillin-clavulanate (AUGMENTIN) 875-125 mg per tablet 1 Tab, 1 Tab, Oral, BID, Levi Shana B, NP 
  [START ON 4/21/2020] aspirin delayed-release tablet 81 mg, 81 mg, Oral, DAILY, Alexi Simsin B, NP 
  [START ON 4/21/2020] atorvastatin (LIPITOR) tablet 40 mg, 40 mg, Oral, DAILY, Levi Shana B, NP 
  [START ON 4/21/2020] cholecalciferol (VITAMIN D3) (1000 Units /25 mcg) tablet 2 Tab, 2,000 Units, Oral, DAILY, Alexi Simsin B, NP 
  clonazePAM (KlonoPIN) tablet 0.25 mg, 0.25 mg, Oral, BID, Levi, Shana B, NP 
  [START ON 4/21/2020] finasteride (PROSCAR) tablet 5 mg, 5 mg, Oral, DAILY, Levi Shana B, NP 
  . PHARMACY TO SUBSTITUTE PER PROTOCOL (Reordered from: gentamicin (GARAMYCIN) 0.1 % topical ointment), , , Per Protocol, Josefina Sims B, NP 
  [START ON 4/21/2020] lactobac ac& pc-s.therm-b.anim (TIAN Q/RISAQUAD), 1 Cap, Oral, DAILY, Levi, Shana B, NP 
  levETIRAcetam (KEPPRA) tablet 250 mg, 250 mg, Oral, BID, Levi, Shana B, NP 
  magnesium oxide (MAG-OX) tablet 800 mg, 800 mg, Oral, BID, Levi, Shana B, NP 
  [START ON 4/21/2020] potassium chloride SR (KLOR-CON 10) tablet 10 mEq, 10 mEq, Oral, DAILY, Levi, Shana B, NP 
  sacubitriL-valsartan (ENTRESTO) 24-26 mg tablet 1 Tab, 1 Tab, Oral, BID, Levi, Shana B, NP 
  tamsulosin (FLOMAX) capsule 0.8 mg, 0.8 mg, Oral, QHS, Levi, Shana B, NP 
  [START ON 4/21/2020] therapeutic multivitamin (THERAGRAN) tablet 1 Tab, 1 Tab, Oral, DAILY, Levi, Shana B, NP 
  [START ON 4/21/2020] venlafaxine-SR (EFFEXOR-XR) capsule 150 mg, 150 mg, Oral, DAILY WITH BREAKFAST, Levi, Shana B, NP 
  sodium chloride (NS) flush 5-40 mL, 5-40 mL, IntraVENous, Q8H, Levi, Shana B, NP 
  sodium chloride (NS) flush 5-40 mL, 5-40 mL, IntraVENous, PRN, Levi, Shana B, NP 
  acetaminophen (TYLENOL) tablet 650 mg, 650 mg, Oral, Q4H PRN, Levi, Shana B, NP 
  oxyCODONE-acetaminophen (PERCOCET) 5-325 mg per tablet 1 Tab, 1 Tab, Oral, Q4H PRN, Levi Shana B, NP 
  naloxone (NARCAN) injection 0.4 mg, 0.4 mg, IntraVENous, PRN, Levi, Shana B, NP 
  ondansetron (ZOFRAN) injection 4 mg, 4 mg, IntraVENous, Q4H PRN, Levi, Shana B, NP 
  docusate sodium (COLACE) capsule 100 mg, 100 mg, Oral, BID, Levi, Shana B, NP 
  hydrALAZINE (APRESOLINE) 20 mg/mL injection 10 mg, 10 mg, IntraVENous, Q4H PRN, Levi, Shana B, NP 
  hydrALAZINE (APRESOLINE) 20 mg/mL injection 20 mg, 20 mg, IntraVENous, Q4H PRN, Levi, Shana B, NP 
  meropenem (MERREM) 500 mg in 0.9% sodium chloride (MBP/ADV) 50 mL, 0.5 g, IntraVENous, Q6H, Levi, Shana B, NP Current Outpatient Medications:  
  potassium chloride SR (KLOR-CON 10) 10 mEq tablet, Take 1 Tab by mouth daily. , Disp: 90 Tab, Rfl: 3 
  clonazePAM (KlonoPIN) 0.5 mg tablet, Take 0.5 Tabs by mouth two (2) times a day. Max Daily Amount: 0.5 mg., Disp: 30 Tab, Rfl: 0 
  sacubitriL-valsartan (Entresto) 24-26 mg tablet, Take 1 Tab by mouth two (2) times a day., Disp: 180 Tab, Rfl: 3 
  amoxicillin-clavulanate (AUGMENTIN) 875-125 mg per tablet, Take 1 Tab by mouth two (2) times a day., Disp: 40 Tab, Rfl: 2 
  tamsulosin (Flomax) 0.4 mg capsule, Take 0.8 mg by mouth nightly., Disp: , Rfl:  
  gentamicin (GARAMYCIN) 0.1 % topical ointment, Apply to exit site daily. , Disp: 30 g, Rfl: 0 
  aspirin delayed-release 81 mg tablet, Take 1 Tab by mouth daily. , Disp: 90 Tab, Rfl: 3 
  venlafaxine-SR (EFFEXOR-XR) 150 mg capsule, Take 1 Cap by mouth daily (with breakfast). , Disp: 90 Cap, Rfl: 3   therapeutic multivitamin (THERAGRAN) tablet, Take 1 Tab by mouth daily. , Disp: 90 Tab, Rfl: 3 
  magnesium oxide (MAG-OX) 400 mg tablet, Take 2 Tabs by mouth two (2) times a day., Disp: 360 Tab, Rfl: 3 
  levETIRAcetam (KEPPRA) 250 mg tablet, Take 1 Tab by mouth two (2) times a day., Disp: 180 Tab, Rfl: 3 
  L. acidoph & paracasei- S therm- Bifido (TIAN-Q/RISAQUAD) 8 billion cell cap cap, Take 1 Cap by mouth daily. , Disp: 90 Cap, Rfl: 3 
  finasteride (PROSCAR) 5 mg tablet, Take 1 Tab by mouth daily. , Disp: 90 Tab, Rfl: 3   cholecalciferol (VITAMIN D3) (1000 Units /25 mcg) tablet, Take 2 Tabs by mouth daily. , Disp: 180 Tab, Rfl: 3 
  atorvastatin (LIPITOR) 40 mg tablet, Take 1 Tab by mouth daily. , Disp: 90 Tab, Rfl: 3 
  warfarin (COUMADIN) 3 mg tablet, Take 1 Tab by mouth daily. May take additional tab as directed by provider., Disp: 120 Tab, Rfl: 3 
  albuterol (PROVENTIL HFA, VENTOLIN HFA, PROAIR HFA) 90 mcg/actuation inhaler, Take 2 Puffs by inhalation every six (6) hours as needed for Wheezing., Disp: 1 Inhaler, Rfl: 3 
  warfarin (COUMADIN) 1 mg tablet, Take 3 Tabs by mouth daily. May take additional tab as directed by provider., Disp: 100 Tab, Rfl: 0 Thank you for letting us see him with you, TIERRA Sherman [address], [address] [place] [address], [address] [place] [address] Office [phone] Fax [phone] [facility] ATTENDING ADDENDUM Patient was seen and examined in person. Data and notes were reviewed. I have discussed and agree with the plan as noted in the NP note above without further additions. Baldo Beverly MD PhD 
[place] Rued[address] [facility]

## 2020-04-21 NOTE — PROGRESS NOTES
[address] [facility] [place] [address] [facility] in [place], [place] Progress Note Patient name: Oh Pierson Patient : 1950 Patient MRN: [id number] Date of service: 20 CHIEF COMPLAINT: 
Chief Complaint Patient presents with  Urinary Frequency  Fever  Shortness of Breath HPI: Lisa Kiser is a 71y.o. year old white male with a history of HTN, HLD, JOSE, CAD s/p cardiac arrest VFib s/p CABG () c/b sternal would infection and sternectomy, ischemic cardiomyopathy LVEF 15-20%, s/p BiVICD  who underwent LVAD as DT on 2019 after bridging with Impella 5.0.  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 He was transferred to [facility] rehab on  and discharged from rehab on 2020. Sophy Valdes was seen  for an LVAD follow up visit. Over the weekend, he developed a fever as high as 100.9 and chills. This was associated with increased urinary frequency and dysuria with foul smelling and discolored urine. Sophy Valdes had been treated with Cipro since  for pseudomonas urosepsis and resultant bacteremia. Per ID, he was to remain on Cipro for at least 6-12 months, possibly lifelong. According to the patient and his wife, his Cipro was stopped by Urology during a recent outpatient visit. He was referred to the ED, evaluated and admitted to [facility]. He remains on CVSU undergoing treatment for UTI and bacteremia. Assessment/Plan: NYHA Class II Sepsis, suspect related to recurrent pseudomonal infection BC  -  bottles + GNR, speciation pending UA suspicious for UTI; urine culture shows < 1,000 CFU  
 LA normal, PCT trending up Continue Merrem ID consult pending ESR 87, trend daily; May need JACQUE later this week CT pelvis to evaluate for prostate abscess IVF D/C'd due to elevated pro-BNP ICM - Stage D,  LVEF 15%, NYHA Class IV improved to NYHA Class III s/p HM3 LVAD as DT on 11/18/19 with Impella 5.0 as bridge to LVAD Continue LVAD speed at 5800 rpm  
 TTE 4/20 shows large LVIDd, 6.84 cm, RVIDd 3.06 cm, TAPSE 1.15 cm, severely elevated CVP Begin albumin 25% + Bumex 1 mg IV BID Begin digoxin 0.0625 mg PO daily to support RV through infection Intolerant to BB d/t RV failure Continue Entresto  24/26 mg PO BID - watch cautiously Intolerant to spironolactone due to IVVD and hyponatremia Dr. Juma Ramos would like to pull sternal wire once stable - will need to wait until active infection has resolved Follow up once discharged from hospital  
 
Chronic anticoagulation, goal INR 1.5-2.0 Continue warfarin and ASA MSSA drive line infection Continue Augmentin 875/125 mg PO BID indefinitely Gentamicin to exit site with daily dressing changes CT x 2 reviewed by Dr. Juma Ramos - small fluid collection adjacent to previous real drain tract; needs close monitoring Repeat CT while IP- ordered Continue daily dressing changes VT - s/p [facility] BiVICD No recent shocks Follow up with EP as directed Interrogate upon admission COPD Albuterol MRI prn 
 
JOSE Encouraged patient to be compliant with CPAP therapy CPAP qHS while IP Follow up with Dr. Abiel Brown Encouraged increased protein intake Tremors Resolved Continue keppra 250 mg BID Continue clonazepam 0.5 mg BID Significant relief from klonopin - plan to wean keppra once symptoms improved with klonopin - trial as IP  
 
BPH On finasteride and tamsulosin Depression On Effexor - mg daily Persistently elevated CEA Persistently elevated CEA; PET scan shows increased activity RML of lung. Oncology consult appreciated - PET not suggestive of malignancy Orthostatic Hypotension Improved Encourage use of [employer] PAF 
 Off digoxin d/t tremors Intolerant of BB d/t RV failure On warfarin Debility Improving Continue PT/OT while inpatient Anemia 
 ?dilutional 
 Check FOB, iron profile CARDIAC IMAGING: 
Chest CTA- no outflow graft occlusion Pet Scan equivocal for amyloid Send [facility]- pending 10/25/19 ECHO ADULT COMPLETE 01/29/2020 1/29/2020 Narrative · Severely dilated left ventricle. Upper normal wall thickness. Severe  
systolic dysfunction. Estimated left ventricular ejection fraction is  
<15%. Left ventricular diastolic dysfunction. · Mitral valve thickening. Minimal mitral valve prolapse of the anterior  
mitral valve leaflet. Trace mitral valve regurgitation is present. · Mild tricuspid valve regurgitation is present. · Mildly dilated right ventricle. Moderately reduced RV systolic function (TAPSE 1.3 cm, RV S' 6 cm/s). Pacer/ICD present. · Mildly biatrial enlargement · Severely elevated central venous pressure (15+ mmHg); IVC diameter is  
larger than 21 mm and collapses less than 50% with respiration. · LVAD inflow cannula spectral Doppler tracings not obtained. Signed by: Janet Arguelles MD  
 
  
  
OTHER IMAGING: 
Head CT negative for acute process EEG suggestive of mild generalized encephalopathic process, possibly related to underlying structural brain injury vs metabolic abnormality Review of Systems Constitutional: Positive for malaise/fatigue. Negative for chills, fever and weight loss. HENT: Positive for hearing loss. Eyes: Negative. Respiratory: Negative. Cardiovascular: Negative. Gastrointestinal: Negative. Genitourinary: Positive for dysuria and hematuria. Negative for frequency and urgency. Musculoskeletal: Negative. Skin: Negative. Neurological: Positive for weakness. Endo/Heme/Allergies: Bruises/bleeds easily. Psychiatric/Behavioral: Negative. Visit Vitals /84 Pulse 95 Temp 97.5 °F (36.4 °C) Resp 16  
 Ht 6' 2\" (1.88 m) Wt 184 lb 4.9 oz (83.6 kg) SpO2 99% BMI 23.66 kg/m² LVAD Pump Speed (RPM): 5800 Pump Flow (LPM): 4.8 MAP: 80 
PI (Pulsitility Index): 3 Power: 4.4  Test: No 
Back Up  at Bedside & Labeled: Yes Power Module Test: No 
Driveline Site Care: No 
Driveline Dressing: Clean, Dry, and Intact Results for orders placed or performed during the hospital encounter of 04/20/20 CULTURE, BLOOD, PAIRED Result Value Ref Range Special Requests: NO SPECIAL REQUESTS Culture result: (A) GRAM NEGATIVE RODS GROWING IN 2 OF 4 BOTTLES DRAWN (SITES = L AC AND R AC) Culture result: REMAINING BOTTLE(S) HAS/HAVE NO GROWTH SO FAR URINE CULTURE HOLD SAMPLE Result Value Ref Range Urine culture hold Urine on hold in Microbiology dept for 2 days. If unpreserved urine is submitted, it cannot be used for addtional testing after 24 hours, recollection will be required. CULTURE, URINE Result Value Ref Range Special Requests: NO SPECIAL REQUESTS Reflexed from [id number] Culture result: No growth (<1,000 CFU/ML) XR CHEST PORT Narrative INDICATION:  SOB, fatigue, fever EXAM: Single portable view of chest 1201 . Comparison:1/30/2020 Findings: Cardiac silhouette is enlarged. AICD and left ventricular assist 
device unchanged. Pulmonary vasculature is not engorged. Interstitial prominence 
has improved in the interval. Given overlying hardware no new consolidation. No 
pneumothorax or effusion Impression Impression: 1. Enlarged cardiac silhouette, diffuse interstitial prominence improved in the 
interval with no new consolidation CT ABD WO CONT Narrative EXAM: CT ABD WO CONT INDICATION: driveline infection COMPARISON: CT 3/23/2020. Chest x-ray 4/20/2020. TECHNIQUE: Unenhanced thin axial images were obtained through the abdomen. Coronal and sagittal reconstructions were generated. Oral contrast was not 
administered. CT dose reduction was achieved through use of a standardized 
protocol tailored for this examination and automatic exposure control for dose 
modulation. FINDINGS:  
 
LUNG BASES: Clear. INCIDENTALLY IMAGED HEART AND MEDIASTINUM: Enlarged heart with chamber dilation, 
coronary artery calcifications, and AICD device redemonstrated. LIVER: Unremarkable. GALLBLADDER: Unremarkable. SPLEEN: Unremarkable. PANCREAS: No mass or duct dilation. ADRENALS: Unremarkable. KIDNEYS AND VISUALIZED URETERS: No mass, calculus, or hydronephrosis. STOMACH: Unremarkable. VISUALIZED BOWEL: No dilation or wall thickening. PERITONEUM: No ascites or pneumoperitoneum. RETROPERITONEUM: Atherosclerotic calcification with aortic ectasia to 2.6 cm 
infrarenally. No enlarged lymphadenopathy. BONES: Degenerative spine change. No acute fracture or aggressive lesion. ADDITIONAL COMMENTS: Bilateral gynecomastia noted. Impression IMPRESSION: Dilated cardiomegaly with ICD device and additional incidental 
findings as above. No acute intra-abdominal process. CBC WITH AUTOMATED DIFF Result Value Ref Range WBC 5.9 4.1 - 11.1 K/uL  
 RBC 3.19 (L) 4.10 - 5.70 M/uL HGB 9.6 (L) 12.1 - 17.0 g/dL HCT 30.4 (L) 36.6 - 50.3 % MCV 95.3 80.0 - 99.0 FL  
 MCH 30.1 26.0 - 34.0 PG  
 MCHC 31.6 30.0 - 36.5 g/dL  
 RDW 16.5 (H) 11.5 - 14.5 % PLATELET 400 (L) 153 - 400 K/uL MPV 9.6 8.9 - 12.9 FL  
 NRBC 0.0 0  WBC ABSOLUTE NRBC 0.00 0.00 - 0.01 K/uL NEUTROPHILS 89 (H) 32 - 75 % LYMPHOCYTES 5 (L) 12 - 49 % MONOCYTES 5 5 - 13 % EOSINOPHILS 1 0 - 7 % BASOPHILS 0 0 - 1 % IMMATURE GRANULOCYTES 0 0.0 - 0.5 % ABS. NEUTROPHILS 5.2 1.8 - 8.0 K/UL  
 ABS. LYMPHOCYTES 0.3 (L) 0.8 - 3.5 K/UL  
 ABS. MONOCYTES 0.3 0.0 - 1.0 K/UL  
 ABS. EOSINOPHILS 0.1 0.0 - 0.4 K/UL  
 ABS. BASOPHILS 0.0 0.0 - 0.1 K/UL ABS. IMM. GRANS. 0.0 0.00 - 0.04 K/UL  
 DF SMEAR SCANNED    
 RBC COMMENTS ANISOCYTOSIS 1+ 
    
 RBC COMMENTS OVALOCYTES 2+ 
    
 RBC COMMENTS SABAS CELLS 1+ WBC COMMENTS RBC FRAGMENTS    
METABOLIC PANEL, COMPREHENSIVE Result Value Ref Range Sodium 134 (L) 136 - 145 mmol/L Potassium 4.2 3.5 - 5.1 mmol/L Chloride 101 97 - 108 mmol/L  
 CO2 23 21 - 32 mmol/L Anion gap 10 5 - 15 mmol/L Glucose 133 (H) 65 - 100 mg/dL BUN 27 (H) 6 - 20 MG/DL Creatinine 1.40 (H) 0.70 - 1.30 MG/DL  
 BUN/Creatinine ratio 19 12 - 20 GFR est AA >60 >60 ml/min/1.73m2 GFR est non-AA 50 (L) >60 ml/min/1.73m2 Calcium 8.9 8.5 - 10.1 MG/DL Bilirubin, total 0.7 0.2 - 1.0 MG/DL  
 ALT (SGPT) 15 12 - 78 U/L  
 AST (SGOT) 16 15 - 37 U/L Alk. phosphatase 168 (H) 45 - 117 U/L Protein, total 7.3 6.4 - 8.2 g/dL Albumin 2.6 (L) 3.5 - 5.0 g/dL Globulin 4.7 (H) 2.0 - 4.0 g/dL A-G Ratio 0.6 (L) 1.1 - 2.2 SAMPLES BEING HELD Result Value Ref Range SAMPLES BEING HELD 1RED,1BLU   
 COMMENT Add-on orders for these samples will be processed based on acceptable specimen integrity and analyte stability, which may vary by analyte. URINALYSIS W/ REFLEX CULTURE Result Value Ref Range Color DARK YELLOW Appearance CLOUDY (A) CLEAR Specific gravity 1.018 1.003 - 1.030    
 pH (UA) 5.5 5.0 - 8.0 Protein 100 (A) NEG mg/dL Glucose Negative NEG mg/dL Ketone Negative NEG mg/dL Bilirubin Negative NEG Blood LARGE (A) NEG Urobilinogen 1.0 0.2 - 1.0 EU/dL Nitrites Negative NEG Leukocyte Esterase SMALL (A) NEG    
 WBC 10-20 0 - 4 /hpf  
 RBC 10-20 0 - 5 /hpf Epithelial cells FEW FEW /lpf Bacteria 1+ (A) NEG /hpf  
 UA:UC IF INDICATED URINE CULTURE ORDERED (A) CNI Budding yeast PRESENT (A) NEG    
LACTIC ACID Result Value Ref Range Lactic acid 1.8 0.4 - 2.0 MMOL/L  
PROTHROMBIN TIME + INR Result Value Ref Range INR 1.9 (H) 0.9 - 1.1 Prothrombin time 18.7 (H) 9.0 - 11.1 sec PROCALCITONIN Result Value Ref Range Procalcitonin 0.31 ng/mL MAGNESIUM Result Value Ref Range Magnesium 1.9 1.6 - 2.4 mg/dL LD Result Value Ref Range  (H) 85 - 547 U/L  
METABOLIC PANEL, COMPREHENSIVE Result Value Ref Range Sodium 134 (L) 136 - 145 mmol/L Potassium 4.3 3.5 - 5.1 mmol/L Chloride 106 97 - 108 mmol/L  
 CO2 25 21 - 32 mmol/L Anion gap 3 (L) 5 - 15 mmol/L Glucose 106 (H) 65 - 100 mg/dL BUN 26 (H) 6 - 20 MG/DL Creatinine 1.16 0.70 - 1.30 MG/DL  
 BUN/Creatinine ratio 22 (H) 12 - 20 GFR est AA >60 >60 ml/min/1.73m2 GFR est non-AA >60 >60 ml/min/1.73m2 Calcium 8.7 8.5 - 10.1 MG/DL Bilirubin, total 0.7 0.2 - 1.0 MG/DL  
 ALT (SGPT) 17 12 - 78 U/L  
 AST (SGOT) 18 15 - 37 U/L Alk. phosphatase 164 (H) 45 - 117 U/L Protein, total 6.7 6.4 - 8.2 g/dL Albumin 2.3 (L) 3.5 - 5.0 g/dL Globulin 4.4 (H) 2.0 - 4.0 g/dL A-G Ratio 0.5 (L) 1.1 - 2.2 MAGNESIUM Result Value Ref Range Magnesium 2.0 1.6 - 2.4 mg/dL LD Result Value Ref Range  85 - 241 U/L  
CBC W/O DIFF Result Value Ref Range WBC 6.2 4.1 - 11.1 K/uL  
 RBC 2.95 (L) 4.10 - 5.70 M/uL HGB 8.6 (L) 12.1 - 17.0 g/dL HCT 28.2 (L) 36.6 - 50.3 % MCV 95.6 80.0 - 99.0 FL  
 MCH 29.2 26.0 - 34.0 PG  
 MCHC 30.5 30.0 - 36.5 g/dL  
 RDW 16.6 (H) 11.5 - 14.5 % PLATELET 887 (L) 816 - 400 K/uL MPV 9.8 8.9 - 12.9 FL  
 NRBC 0.0 0  WBC ABSOLUTE NRBC 0.00 0.00 - 0.01 K/uL PROTHROMBIN TIME + INR Result Value Ref Range INR 2.0 (H) 0.9 - 1.1 Prothrombin time 19.8 (H) 9.0 - 11.1 sec PROCALCITONIN Result Value Ref Range Procalcitonin 0.61 ng/mL NT-PRO BNP Result Value Ref Range NT pro-BNP 9,988 (H) <125 PG/ML  
FERRITIN Result Value Ref Range Ferritin 344 26 - 388 NG/ML  
EKG, 12 LEAD, INITIAL Result Value Ref Range  Ventricular Rate 121 BPM  
 Atrial Rate 121 BPM  
 QRS Duration 20 ms  
 Q-T Interval 326 ms  
 QTC Calculation (Bezet) 462 ms Calculated R Axis 91 degrees Calculated T Axis 147 degrees Diagnosis ** Poor data quality, interpretation may be adversely affected Ventricular-paced rhythm Biventricular pacemaker detected When compared with ECG of 22-DEC-2019 18:48, 
Vent. rate has increased BY  43 BPM 
Confirmed by Isael Jones M.D., Darby Rea ([id number]) on 4/20/2020 3:02:00 PM 
  
ECHO ADULT COMPLETE Result Value Ref Range Tapse 1.15 (A) 1.5 - 2.0 cm Ao Root D 3.95 cm LVIDd 6.84 (A) 4.2 - 5.9 cm  
 LVPWd 0.90 0.6 - 1.0 cm LVIDs 6.26 cm IVSd 0.82 0.6 - 1.0 cm Left Atrium to Aortic Root Ratio 1.25   
 RVIDd 3.06 cm  
 LV Mass .0 (A) 88 - 224 g LV Mass AL Index 147.3 49 - 115 g/m2 Left Atrium Major Axis 4.94 cm Triscuspid Valve Regurgitation Peak Gradient 37.4 mmHg Pulmonic Valve Max Velocity 100.20 cm/s  
 TR Max Velocity 305.97 cm/s Left Ventricular Fractional Shortening by 2D [id number] % PV peak gradient 4.0 mmHg Narrative · Image quality for this study was technically difficult. · Normal wall thickness. Severely dilated left ventricle. Severe systolic  
function. Estimated left ventricular ejection fraction is <15%. LVAD  
present. 5800 rpm 
· Dilated left atrium. · RV Not well visualized. Mildly reduced systolic function. · Mild aortic valve regurgitation is present. Aortic valve does not open  
in systole. · Mild mitral valve regurgitation is present. · Mild to moderate tricuspid valve regurgitation is present. · Severely elevated central venous pressure (15+ mmHg); IVC diameter is  
larger than 21 mm and collapses less than 50% with respiration. Physical Exam  
Constitutional: He is oriented to person, place, and time. He appears well-developed and well-nourished. No distress. HENT:  
Head: Normocephalic. Eyes: Pupils are equal, round, and reactive to light. Neck: Normal range of motion. No JVD present. Cardiovascular: Regular rhythm and normal heart sounds. Tachycardia present. + VAD sounds Pulmonary/Chest: Effort normal. No respiratory distress. Abdominal: Soft. Bowel sounds are normal. He exhibits no distension. Musculoskeletal: Normal range of motion. General: No edema. Neurological: He is alert and oriented to person, place, and time. Skin: Skin is warm. He is not diaphoretic. Psychiatric: He has a normal mood and affect. Nursing note and vitals reviewed. PAST MEDICAL HISTORY: 
Past Medical History:  
Diagnosis Date  Degenerative disc disease, lumbar  Heart failure ([address]  High cholesterol  Hypertension  Paroxysmal atrial fibrillation ([address] 4/2/2019  Spinal stenosis PAST SURGICAL HISTORY: 
Past Surgical History:  
Procedure Laterality Date  COLONOSCOPY Left 11/11/2019 COLONOSCOPY at bedside performed by Tomas Hussein MD at [address]  HX CORONARY ARTERY BYPASS GRAFT    
 triple  HX HEART ASSIST DEVICE Left 10/29/2019 Impella 5.0  
 HX HEART ASSIST DEVICE Left 11/18/2019 HeartMate 3  
 HX HERNIA REPAIR    
 HX IMPLANTABLE CARDIOVERTER DEFIBRILLATOR  HX THROMBECTOMY Left 11/25/2019 Left brachial thrombectomy  MT CARDIOVERSION ELECTIVE ARRHYTHMIA EXTERNAL N/A 6/10/2019 EP CARDIOVERSION performed by Osei Dean MD at [place] [address] CATH LAB  MT CARDIOVERSION ELECTIVE ARRHYTHMIA EXTERNAL N/A 6/18/2019 EP CARDIOVERSION performed by Amanuel Andino MD at [place] [address] CATH LAB  MT INSJ/RPLCMT PERM DFB W/TRNSVNS LDS 1/DUAL CHMBR N/A 6/21/2019 INSERT ICD BIV MULTI performed by Estefana Goodell, MD at [facility] [address] [address], [address] CATH LAB  MT TCAT IMPL WRLS P-ART PRS SNR L-T HEMODYN MNTR N/A 9/18/2019 IMPLANT HEART FAILURE MONITORING DEVICE performed by Jose J Castillo MD at [facility] [address] [address], [address] CATH LAB FAMILY HISTORY: 
Family History Problem Relation Age of Onset  Lung Disease Mother  Hypertension Mother [facility] Arthritis-osteo Mother  Heart Disease Mother  Heart Disease Father  Diabetes Father SOCIAL HISTORY: 
Social History Socioeconomic History  Marital status:  Spouse name: Not on file  Number of children: Not on file  Years of education: Not on file  Highest education level: Not on file Tobacco Use  Smoking status: Former Smoker Last attempt to quit: 2010 Years since quittin.3  Smokeless tobacco: Never Used Substance and Sexual Activity  Alcohol use: Not Currently Comment: rarely  Drug use: Never Other Topics Concern ALLERGY: 
Allergies Allergen Reactions  Cefepime Other (comments)  
  myoclonus CURRENT MEDICATIONS: 
 
Current Facility-Administered Medications:  
  digoxin (LANOXIN) tablet 0.0625 mg, 0.0625 mg, Oral, DAILY, Shital Herrera NP 
  albumin human 25% (BUMINATE) solution 12.5 g, 12.5 g, IntraVENous, ONCE, Shital Herrera NP 
  bumetanide (BUMEX) injection 1 mg, 1 mg, IntraVENous, BID, Shital Herrera NP 
  albuterol-ipratropium (DUO-NEB) 2.5 MG-0.5 MG/3 ML, 3 mL, Nebulization, Q6H PRN, Levi, Shana B, NP 
  amoxicillin-clavulanate (AUGMENTIN) 875-125 mg per tablet 1 Tab, 1 Tab, Oral, BID, Levi, Shana B, NP, 1 Tab at 20 0840   aspirin delayed-release tablet 81 mg, 81 mg, Oral, DAILY, Levi, Shana B, NP, 81 mg at 20 0841 
  atorvastatin (LIPITOR) tablet 40 mg, 40 mg, Oral, DAILY, Levi, Shana B, NP, 40 mg at 20 0840   cholecalciferol (VITAMIN D3) (1000 Units /25 mcg) tablet 2 Tab, 2,000 Units, Oral, DAILY, Levi, Shana B, NP, 2 Tab at 20 2124   clonazePAM (KlonoPIN) tablet 0.25 mg, 0.25 mg, Oral, BID, Levi, Shana B, NP, 0.25 mg at 20 0841 
  finasteride (PROSCAR) tablet 5 mg, 5 mg, Oral, DAILY, Shana Sims, NP, 5 mg at 20 0840   gentamicin (GARAMYCIN) 0.1 % cream, , Topical, DAILY, Levi, Shana B, NP 
  lactobac ac& pc-s.therm-b.anim (TIAN Q/RISAQUAD), 1 Cap, Oral, DAILY, Levi, Shana B, NP, 1 Cap at 04/21/20 5318   levETIRAcetam (KEPPRA) tablet 250 mg, 250 mg, Oral, Q12H, Levi, Shana B, NP, 250 mg at 04/21/20 0840 
  magnesium oxide (MAG-OX) tablet 800 mg, 800 mg, Oral, BID, Levi, Shana B, NP, 800 mg at 04/21/20 0840   potassium chloride SR (KLOR-CON 10) tablet 10 mEq, 10 mEq, Oral, DAILY, Levi, Shana B, NP, 10 mEq at 04/21/20 0840 
  sacubitriL-valsartan (ENTRESTO) 24-26 mg tablet 1 Tab, 1 Tab, Oral, BID, Levi, Shana B, NP, 1 Tab at 04/21/20 4740   tamsulosin (FLOMAX) capsule 0.8 mg, 0.8 mg, Oral, QHS, Levi, Shana B, NP, 0.8 mg at 04/20/20 2300   therapeutic multivitamin (THERAGRAN) tablet 1 Tab, 1 Tab, Oral, DAILY, Levi, Shana B, NP, 1 Tab at 04/21/20 0841 
  venlafaxine-SR (EFFEXOR-XR) capsule 150 mg, 150 mg, Oral, DAILY WITH BREAKFAST, Levi, Shana B, NP, 150 mg at 04/21/20 0840 
  sodium chloride (NS) flush 5-40 mL, 5-40 mL, IntraVENous, Q8H, Levi, Shana B, NP, 10 mL at 04/21/20 7666   sodium chloride (NS) flush 5-40 mL, 5-40 mL, IntraVENous, PRN, Levi, Shana B, NP 
  acetaminophen (TYLENOL) tablet 650 mg, 650 mg, Oral, Q4H PRN, Levi, Shana B, NP, 650 mg at 04/20/20 1955   oxyCODONE-acetaminophen (PERCOCET) 5-325 mg per tablet 1 Tab, 1 Tab, Oral, Q4H PRN, Levi, Shana B, NP 
  naloxone (NARCAN) injection 0.4 mg, 0.4 mg, IntraVENous, PRN, Levi, Shana B, NP 
  ondansetron (ZOFRAN) injection 4 mg, 4 mg, IntraVENous, Q4H PRN, Levi, Shana B, NP 
  docusate sodium (COLACE) capsule 100 mg, 100 mg, Oral, BID, Levi, Shana B, NP, 100 mg at 04/21/20 0840   hydrALAZINE (APRESOLINE) 20 mg/mL injection 10 mg, 10 mg, IntraVENous, Q4H PRN, Levi, Shana B, NP 
  hydrALAZINE (APRESOLINE) 20 mg/mL injection 20 mg, 20 mg, IntraVENous, Q4H PRN, Shana Sims, NP 
  meropenem (MERREM) 500 mg in 0.9% sodium chloride (MBP/ADV) 50 mL, 0.5 g, IntraVENous, Q6H, Shana Sims, NP, Last Rate: 100 mL/hr at 04/21/20 0812, 500 mg at 04/21/20 8509   Warfarin NP Dosing, , Other, PRN, Linda Graham MD 
 
 
Thank you for letting us see him with you, TIERRA Landrum [address], [address], [address] Office [phone] Fax [phone] [facility] ATTENDING ADDENDUM Patient was seen and examined in person. Data and notes were reviewed. I have discussed and agree with the plan as noted in the NP note above without further additions. Bess Swift MD PhD 
[address] [facility]

## 2020-04-21 NOTE — PROGRESS NOTES
Problem: Self Care Deficits Care Plan (Adult) Goal: *Acute Goals and Plan of Care (Insert Text) Description:  
FUNCTIONAL STATUS PRIOR TO ADMISSION: Patient with history of LVAD placement 11/2019, [facility], then d/c home with wife in 2/2020. Patient reports he had progressed to modified independent level for ADLs and mobility, using RW only when fatigued, and endorses 3 falls since returning home. Patient reports he performed LVAD switchovers without assistance at home, that he had not progressed to showering yet, and he was active, for example mowing the lawn with a riding mower. HOME SUPPORT: Patient lived with his wife but did not require assistance Occupational Therapy Goals Initiated 4/21/2020 1. Patient will perform grooming standing at sink with with modified independence within 7 day(s). 2.  Patient will perform lower body dressing with modified independence within 7 day(s). 3.  Patient will perform bathing with modified independence within 7 day(s). 4.  Patient will perform toilet transfers with modified independence within 7 day(s). 5.  Patient will perform all aspects of toileting with modified independence within 7 day(s). 6.  Patient will participate in upper extremity therapeutic exercise/activities with independence for 10 minutes within 7 day(s). 7.  Patient will utilize energy conservation techniques during functional activities with verbal cues within 7 day(s). 8.  Patient will utilize fall prevention techniques during functional activities with verbal cues within 7 days. Outcome: Progressing Towards Goal 
 
OCCUPATIONAL THERAPY EVALUATION Patient: Thu Castrejon (75 y.o. male) Date: 4/21/2020 Primary Diagnosis: Sepsis ([facility] [A41.9] Precautions: fall ASSESSMENT Based on the objective data described below, the patient presents with impaired RW management (required cues avoid pushing on RW for sit-stand, to keep RW anteriorly during stand-sit, and to avoid colliding with obstacles with RW during ambulation), impaired dynamic standing balance, and BLE weakness. Patient reports he has been managing LVAD without assistance at home and performed battery > PM switchover with supervision in OT eval today. Patient reports progressing to modified independent for ADLs and mobility but endorses 3 falls at home since discharging [facility] rehab in 2/2020. He was receptive to eduation today on fall prevention as well as energy conservation/ work simplification. Will follow in acute setting to promote fall prevention, functional endurance, and strengthening. Recommend d/c home with family support and Vogel Glatter pending progress. Current Level of Function Impacting Discharge (ADLs/self-care): contact guard assistance Functional Outcome Measure: The patient scored 55/100 on the Barthel Index outcome measure which is indicative of ~45% impairment in functional performance. Patient will benefit from skilled therapy intervention to address the above noted impairments. PLAN : 
Recommendations and Planned Interventions: self care training, functional mobility training, therapeutic exercise, balance training, therapeutic activities, endurance activities, patient education, home safety training, and family training/education Frequency/Duration: Patient will be followed by occupational therapy 5 times a week to address goals. Recommendation for discharge: (in order for the patient to meet his/her long term goals) Recommend d/c home with family support and Vogel Glatter pending progress. This discharge recommendation: 
Has not yet been discussed the attending provider and/or case management SUBJECTIVE:  
Patient stated I feel wobbly.  OBJECTIVE DATA SUMMARY:  
HISTORY:  
Past Medical History:  
Diagnosis Date Degenerative disc disease, lumbar Heart failure ([address] High cholesterol Hypertension Paroxysmal atrial fibrillation ([facility] 4/2/2019 Spinal stenosis Past Surgical History:  
Procedure Laterality Date COLONOSCOPY Left 11/11/2019 COLONOSCOPY at bedside performed by Everardo Marc MD at [address] HX CORONARY ARTERY BYPASS GRAFT    
 triple HX HEART ASSIST DEVICE Left 10/29/2019 Impella 5.0 HX HEART ASSIST DEVICE Left 11/18/2019 HeartMate 3 HX HERNIA REPAIR    
 HX IMPLANTABLE CARDIOVERTER DEFIBRILLATOR    
 HX THROMBECTOMY Left 11/25/2019 Left brachial thrombectomy [place] CARDIOVERSION ELECTIVE ARRHYTHMIA EXTERNAL N/A 6/10/2019 EP CARDIOVERSION performed by Wilman Julian MD at [place] [address], [address] CATH LAB  
 [place] CARDIOVERSION ELECTIVE ARRHYTHMIA EXTERNAL N/A 6/18/2019 EP CARDIOVERSION performed by Osorio Blood MD at [place] [address], [address] CATH LAB  
 [place] INSJ/RPLCMT PERM DFB W/TRNSVNS LDS 1/DUAL [facility] N/A 6/21/2019 INSERT ICD BIV MULTI performed by Ml Ma MD at [place] [address], [address] CATH LAB  
 [place] TCAT IMPL WRLS P-ART PRS SNR L-T HEMODYN MNTR N/A 9/18/2019 IMPLANT HEART FAILURE MONITORING DEVICE performed by Courtney Jorgensen MD at [place] [address], [address] CATH LAB Expanded or extensive additional review of patient history:  
 
Home Situation Home Environment: Private residence # Steps to Enter: 0 Rails to Enter: No 
One/Two Story Residence: One story Living Alone: No 
Support Systems: Spouse/Significant Other/Partner Patient Expects to be Discharged to[de-identified] Private residence Current DME Used/Available at Home: Walker, rolling EXAMINATION OF PERFORMANCE DEFICITS: 
Cognitive/Behavioral Status: 
Neurologic State: Alert Orientation Level: Oriented X4 Cognition: Appropriate decision making; Appropriate safety awareness; Appropriate for age attention/concentration; Follows commands Perception: Appears intact Perseveration: No perseveration noted Safety/Judgement: Awareness of environment; Insight into deficits Skin: visible skin appears intact Edema: none noted Hearing: Auditory Auditory Impairment: None Vision/Perceptual:   
Tracking: Able to track stimulus in all quadrants w/o difficulty Visual Fields: (able to detect in all fields) Acuity: Within Defined Limits Corrective Lenses: Glasses Range of Motion: 
 
AROM: Within functional limits(BUE) Strength: 
 
Strength: Within functional limits(BUE) Coordination: 
Coordination: Within functional limits Fine Motor Skills-Upper: Left Intact; Right Intact Gross Motor Skills-Upper: Left Intact; Right Intact Tone & Sensation: 
 
Tone: Normal 
Sensation: Intact Balance: 
Sitting: Intact Standing: Impaired Standing - Static: Good;Constant support(RW) 
Standing - Dynamic : Fair Functional Mobility and Transfers for ADLs: 
 
 
Transfers: 
Sit to Stand: Contact guard assistance Stand to Sit: Contact guard assistance Toilet Transfer : Contact guard assistance(inferred) ADL Assessment: 
Feeding: Independent(inferred) Oral Facial Hygiene/Grooming: Contact guard assistance(inferred standing) Bathing: Contact guard assistance(inferred) Upper Body Dressing: Setup(inferred) Lower Body Dressing: Contact guard assistance(inferred) Toileting: Contact guard assistance(inferred) ADL Intervention and task modifications: 
  
 
  
 
Cognitive Retraining Safety/Judgement: Awareness of environment; Insight into deficits Functional Measure: 
Barthel Index: 
 
Bathin Bladder: 0 Bowels: 10 
Groomin Dressin Feeding: 10 Mobility: 10 Stairs: 0 Toilet Use: 5 Transfer (Bed to Chair and Back): 10 Total: 55/100 The Barthel ADL Index: Guidelines 1. The index should be used as a record of what a patient does, not as a record of what a patient could do.  
2. The main aim is to establish degree of independence from any help, physical or verbal, however minor and for whatever reason. 3. The need for supervision renders the patient not independent. 4. A patient's performance should be established using the best available evidence. Asking the patient, friends/relatives and nurses are the usual sources, but direct observation and common sense are also important. However direct testing is not needed. 5. Usually the patient's performance over the preceding 24-48 hours is important, but occasionally longer periods will be relevant. 6. Middle categories imply that the patient supplies over 50 per cent of the effort. 7. Use of aids to be independent is allowed. Rubi Mcleod., Barthel, DKitW. (1031). Functional evaluation: the Barthel Index. [facility] (14)2. Carli Rowe gabriela CAROLINA Velez, Armando Silverman., Lars Posey., Joe, [address] [place] [address] Ave (1999). Measuring the change indisability after inpatient rehabilitation; comparison of the responsiveness of the Barthel Index and Functional [place] Measure. Journal of Neurology, Neurosurgery, and Psychiatry, 66(4), 670-785. Raul Davila N.J.A, SEVERO Lindsay, & Evelin Agudelo MLILIAN. (2004.) Assessment of post-stroke quality of life in cost-effectiveness studies: The usefulness of the Barthel Index and the EuroQoL-5D. [facility], 13, 690-65 Occupational Therapy Evaluation Charge Determination History Examination Decision-Making LOW Complexity : Brief history review  MEDIUM Complexity : 3-5 performance deficits relating to physical, cognitive , or psychosocial skils that result in activity limitations and / or participation restrictions MEDIUM Complexity : Patient may present with comorbidities that affect occupational performnce. Miniml to moderate modification of tasks or assistance (eg, physical or verbal ) with assesment(s) is necessary to enable patient to complete evaluation Based on the above components, the patient evaluation is determined to be of the following complexity level: LOW Pain Rating: 
Patient did not report pain Activity Tolerance: HR 90s at rest elevated to 121 with activity, recovered to 90s within ~3 min seated rest 
 
After treatment patient left in no apparent distress:   
Sitting in chair and Call bell within reach COMMUNICATION/EDUCATION:  
The patients plan of care was discussed with: Physical therapist and Registered nurse. Home safety education was provided and the patient/caregiver indicated understanding., Patient/family have participated as able in goal setting and plan of care. , and Patient/family agree to work toward stated goals and plan of care. This patients plan of care is appropriate for delegation to [facility] [place]. Thank you for this referral. 
Ivy Friedman OT Time Calculation: 23 mins

## 2020-04-21 NOTE — PROGRESS NOTES
NUTRITION COMPLETE ASSESSMENT 
 
RECOMMENDATIONS:  
1. Continue to encourage PO intake with all meals - appreciate documentation - please continue 2. Daily weights Interventions/Plan:  
Food/Nutrient Delivery:  (no -added salt) Commercial supplement(see below) Assessment:  
Reason for Assessment: [facility] Diet: (2gm Na) Supplements: None Nutritionally Significant Medications: [x] Reviewed & Includes: augmentin, vit D, klonopin, colace, gentamicin, Bhakti-Q, keppra, mag-ox, merrem, KCl, MVI, NS @ 75ml/hr; PRN: zofran Meal Intake:  
Patient Vitals for the past 100 hrs: 
 % Diet Eaten 04/21/20 0806 100 % Subjective: Assessment completed by chart review, Staff Interviewed. Pt sleeping soundly at time of visit, did not wake to name. Objective: PMHx: HTN, CKD, CHF, depression, others noted. S/p LVAD placement on 11/18. CVA and bacteremia post-op with extended stay. Driveline infection noted. Pt very well known to nutrition services from extended stay after LVAD and with struggles with PO intake during that period of time. DHT for a period of time followed by poor PO intake but slowly improved prior to discharge. Depression last admit with lexapro at that time. Remeron and marinol trialed but minimal impact on PO. Has done well with vanilla and strawberry Ensure/Glucerna and does best with breakfast meal. Meal options had been a barrier with extended stay so family bringing food from home last admission. Visitors limited at this time but if family allow to visit please allow food from home to be brought in. Will liberalize diet to regular, no added salt to encourage PO. Spoke with RN and happy to hear pt did very well with breakfast (100%). Wt has been relatively stable for past month around 185#. Will still add supplements since would benefit from added protein. Plan to follow for wt trends and PO intake. Estimated Nutrition Needs:  
Kcals/day: 2160 Kcals/day(1994-2160kcal) Protein: 100 g(100g (1.2g/kg)) Fluid: 2100 ml(25ml/kg or per cardio) Based On: [place] [address] Chloe(x 1.2-1.3) Weight Used: Actual wt(83.6kg) Pt expected to meet estimated nutrient needs:  []   Yes     []  No [x] Unable to predict at this time Nutrition Diagnosis:  
1. (Increased protein/energy needs) related to LVAD, bacteremia as evidenced by pt hx Goals:   
 Continued consumption of at least 75% meals and 1 supplement/day in 5-7 days Monitoring & Evaluation: - Total energy intake, Liquid meal replacement, Protein intake - Weight/weight change Previous Nutrition Goals Met:   N/A Previous Recommendations:    N/A Education & Discharge Needs: 
 [x] None Identified   [] Identified and addressed  
 [] Participated in care plan, discharge planning, and/or interdisciplinary rounds Nutrition Discharge Plan:  
[x] Too soon to determine 
[] Other:  
    
Cultural, Druze and ethnic food preferences identified: None Skin Integrity: [x]Intact  []Other Edema: [x]None []Other Last BM: 4/20 Food Allergies: [x]None []Other Diet Restrictions: Cultural/Taoism Preference(s): None Anthropometrics:   
Weight Loss Metrics 4/21/2020 4/20/2020 4/7/2020 3/23/2020 3/16/2020 3/9/2020 3/2/2020 Today's Wt 184 lb 4.9 oz - 183 lb 6.4 oz 186 lb 189 lb 189 lb 189 lb BMI - 23.66 kg/m2 23.55 kg/m2 23.88 kg/m2 24.27 kg/m2 24.27 kg/m2 24.27 kg/m2 Weight Source: Standing scale (comment) Height: 6' 2\" (188 cm), Body mass index is 23.66 kg/m². IBW : 86.2 kg (190 lb), % IBW (Calculated): 97 % Usual Body Weight: 87.5 kg (193 lb),   
 
Labs:   
Lab Results Component Value Date/Time  Sodium 134 (L) 04/21/2020 03:47 AM  
 Potassium 4.3 04/21/2020 03:47 AM  
 Chloride 106 04/21/2020 03:47 AM  
 CO2 25 04/21/2020 03:47 AM  
 Glucose 106 (H) 04/21/2020 03:47 AM  
 BUN 26 (H) 04/21/2020 03:47 AM  
 Creatinine 1.16 04/21/2020 03:47 AM  
 Calcium 8.7 04/21/2020 03:47 AM  
 Magnesium 2.0 04/21/2020 03:47 AM  
 Phosphorus 3.3 11/27/2019 04:18 AM  
 Albumin 2.3 (L) 04/21/2020 03:47 AM  
 
Lab Results Component Value Date/Time Hemoglobin A1c 6.6 (H) 10/25/2019 02:56 PM  
 
 
Casey Reynoso RD [facility], Pager #[phone] or via [place]

## 2020-04-21 NOTE — PROGRESS NOTES
0730 Bedside shift change report given to Willa Butts RN (oncoming nurse) by Benita Rowe RN (offgoing nurse). Report included the following information SBAR, Kardex, Intake/Output, MAR and Recent Results. 1930 Bedside shift change report given to Benita Rowe RN (oncoming nurse) by Willa Butts RN (offgoing nurse). Report included the following information SBAR, Kardex, Intake/Output, MAR and Recent Results.

## 2020-04-21 NOTE — PROGRESS NOTES
Problem: Falls - Risk of 
Goal: *Absence of Falls Description: Document Chang Caceres Fall Risk and appropriate interventions in the flowsheet. Outcome: Progressing Towards Goal 
Note: Fall Risk Interventions: 
Mobility Interventions: Communicate number of staff needed for ambulation/transfer Medication Interventions: Evaluate medications/consider consulting pharmacy Elimination Interventions: Call light in reach History of Falls Interventions: Evaluate medications/consider consulting pharmacy, Investigate reason for fall Problem: Pressure Injury - Risk of 
Goal: *Prevention of pressure injury Description: Document Mich Scale and appropriate interventions in the flowsheet. Outcome: Progressing Towards Goal 
Note: Pressure Injury Interventions: 
Sensory Interventions: Minimize linen layers Moisture Interventions: Absorbent underpads Activity Interventions: PT/OT evaluation Mobility Interventions: Pressure redistribution bed/mattress (bed type) Nutrition Interventions: Document food/fluid/supplement intake Friction and Shear Interventions: Minimize layers Problem: Urinary Tract Infection - Adult Goal: *Absence of infection signs and symptoms Outcome: Progressing Towards Goal 
 
0800: Bedside shift change report given to Bridget Infante (oncoming nurse) by Jhonatan (offgoing nurse). Report included the following information SBAR, Kardex, Intake/Output, MAR and Recent Results. 9806: Preliminary report from Bryce Smith in lab. Gram neg rods found in blood cx drawn 4/20. 2 of 4 samples one on each side. Passed on in am report.

## 2020-04-21 NOTE — PROGRESS NOTES
Transitions of Care Plan: 
   Patient admitted for sepsis/UTI; LVAD; prev. Inpatient rehab at [facility] monitoring IV antibiotics CM notified [facility] for patient's admission Reason for Admission:   Sepsis RUR Score:    23% PCP: First and Last name: Nati Sesay MD 
 Name of Practice: 
 Are you a current patient: Yes/No: 
 Approximate date of last visit:  
          
Resources/supports as identified by patient/family:   Family support. Top Challenges facing patient (as identified by patient/family and CM): Advanced Heart Failure; debility/weakness; infection Finances/Medication cost?      Medicare A/B;  Transportation? Wife assists with transportation Support system or lack thereof? Family support. Living arrangements? Address on file with wife. Self-care/ADLs/Cognition? Rolling walker for ambulation. Wheelchair for mobility. Alert and oriented. Wife assists with self-care. Current Advanced Directive/Advance Care Plan: On file. Full Code. Plan for utilizing home health: [facility] - resumption of care orders needed prior to d/c Transition of Care Plan:               
 
Patient well known to CM from LVAD implant hospitalization. CM contacted Aura Flower with [facility] to notify of admission. CM is monitoring for home IV antibiotic need. CM will continue to follow. Mina Hanks, MPH Care Management Interventions PCP Verified by CM: Yes 
Palliative Care Criteria Met (RRAT>21 & CHF Dx)?: Yes 
Palliative Consult Recommended?: No 
Reason Palliative Care Not Recommended?: Patient already received consult Mode of Transport at Discharge: Other (see comment)(Wife, private car) Transition of Care Consult (CM Consult): Discharge Planning, Home Health [address] [place] [address] Road:  No 
 Reason [facility]: Out of service area, Patient already serviced by other home care/hospice agency [facility] Signup: Yes Discharge Durable Medical Equipment: No 
Health Maintenance Reviewed: Yes Physical Therapy Consult: Yes Occupational Therapy Consult: Yes Speech Therapy Consult: No 
Current Support Network: Lives with Spouse Confirm Follow Up Transport: Family Discharge Location Discharge Placement: Home with home health([facility])

## 2020-04-21 NOTE — CONSULTS
Infectious Disease Consult Today's Date: 2020 Admit Date: 2020 Impression: · Chronic LVAD drive line infection · Pseudomonas UTI with bacteremia--planned on extended course of antibiotic therapy, but this was stopped in the outpatient setting by urology · Now with GNR in blood culture Plan: · IV antibiotic therapy · Follow up blood culture results and adjust medications as needed Anti-infectives: · Merrem · Amoxicillin Subjective:  
Date of Consultation:  2020 Referring Physician: Dr Alessandro Phelps Patient is a 71 y.o. male admitted with fevers and foul smelling urine. He is known to me from care given over the winter for LVAD drive line infection and Pseudomonas UTI. He became ill a couple of days ago with fevers and change in his urine. He is admitted for this and blood cultures are growing GNR. He is on IV antibiotic therapy and we are asked to see him in consultation. Patient Active Problem List  
Diagnosis Code  Paroxysmal atrial fibrillation ([facility]) I48.0  Acute on chronic systolic CHF (congestive heart failure) ([facility]) I50.23  Systolic CHF, chronic ([facility]) I50.22  
 Peripheral vascular disease ([facility]) I73.9  Acute decompensated heart failure ([facility]) I50.9  Tremor R25.1  Chronic anticoagulation Z79.01  
 LVAD (left ventricular assist device) present ([facility] Z95.811  Sepsis ([facility] A41.9 Past Medical History:  
Diagnosis Date  Degenerative disc disease, lumbar  Heart failure ([address]  High cholesterol  Hypertension  Paroxysmal atrial fibrillation ([facility] 2019  Spinal stenosis Family History Problem Relation Age of Onset  Lung Disease Mother  Hypertension Mother Suzette Arthritis-osteo Mother  Heart Disease Mother  Heart Disease Father  Diabetes Father Social History Tobacco Use  Smoking status: Former Smoker Last attempt to quit: 2010 Years since quittin.3  Smokeless tobacco: Never Used Substance Use Topics  Alcohol use: Not Currently Comment: rarely Past Surgical History:  
Procedure Laterality Date  COLONOSCOPY Left 11/11/2019 COLONOSCOPY at bedside performed by Sheree Mina MD at [address]  HX CORONARY ARTERY BYPASS GRAFT    
 triple  HX HEART ASSIST DEVICE Left 10/29/2019 Impella 5.0  
 HX HEART ASSIST DEVICE Left 11/18/2019 HeartMate 3  
 HX HERNIA REPAIR    
 HX IMPLANTABLE CARDIOVERTER DEFIBRILLATOR  HX THROMBECTOMY Left 11/25/2019 Left brachial thrombectomy  [facility] CARDIOVERSION ELECTIVE ARRHYTHMIA EXTERNAL N/A 6/10/2019 EP CARDIOVERSION performed by Rafat Dawkins MD at [address] [facility], [facility] Dr CATH LAB  [facility] CARDIOVERSION ELECTIVE ARRHYTHMIA EXTERNAL N/A 6/18/2019 EP CARDIOVERSION performed by Aracelis Beck MD at [address] [place] [address], [facility] [address] CATH LAB  [facility] INSJ/RPLCMT PERM DFB W/TRNSVNS LDS 1/DUAL CHMBR N/A 6/21/2019 INSERT ICD BIV MULTI performed by Jaspal Stinson MD at [address] [place] [address], [facility] [address] CATH LAB  [facility] TCAT IMPL WRLS P-ART PRS SNR L-T HEMODYN MNTR N/A 9/18/2019 IMPLANT HEART FAILURE MONITORING DEVICE performed by Vanessa Obando MD at [address] [place] [address], [facility] Dr CATH LAB Prior to Admission medications Medication Sig Start Date End Date Taking? Authorizing Provider  
budesonide (PULMICORT) 0.5 mg/2 mL nbsp 500 mcg by Nebulization route every evening. Yes Provider, Historical  
albuterol-ipratropium (DUO-NEB) 2.5 mg-0.5 mg/3 ml nebu 3 mL by Nebulization route nightly. Yes Provider, Historical  
potassium chloride SR (KLOR-CON 10) 10 mEq tablet Take 1 Tab by mouth daily. 4/7/20   Georgiana Herrera NP  
clonazePAM (KlonoPIN) 0.5 mg tablet Take 0.5 Tabs by mouth two (2) times a day. Max Daily Amount: 0.5 mg. 4/7/20   Georgiana Herrera, TIERRA  
sacubitriL-valsartan [place]) 24-26 mg tablet Take 1 Tab by mouth two (2) times a day.  4/7/20   Toyin Galloway NP  
 amoxicillin-clavulanate (AUGMENTIN) 875-125 mg per tablet Take 1 Tab by mouth two (2) times a day. 3/25/20   Yonny Sims NP  
tamsulosin (Flomax) 0.4 mg capsule Take 0.8 mg by mouth nightly. Provider, Historical  
gentamicin (GARAMYCIN) 0.1 % topical ointment Apply to exit site daily. 3/16/20   Pato Herrera NP  
aspirin delayed-release 81 mg tablet Take 1 Tab by mouth daily. 3/9/20   Pato Hrerera NP  
venlafaxine-SR [facility] 150 mg capsule Take 1 Cap by mouth daily (with breakfast). 3/9/20   Pato Herrera NP  
therapeutic multivitamin [facility]) tablet Take 1 Tab by mouth daily. 3/9/20   Pato Herrera NP  
magnesium oxide (MAG-OX) 400 mg tablet Take 2 Tabs by mouth two (2) times a day. 3/9/20   Pato Herrera NP  
levETIRAcetam (KEPPRA) 250 mg tablet Take 1 Tab by mouth two (2) times a day. 3/9/20   Pato Herrera NP  
L. acidoph & paracasei- S therm- Bifido (TIAN-Q/RISAQUAD) 8 billion cell cap cap Take 1 Cap by mouth daily. 3/9/20   Pato Herrera NP  
finasteride (PROSCAR) 5 mg tablet Take 1 Tab by mouth daily. 3/9/20   Pato Herrera NP  
cholecalciferol (VITAMIN D3) (1000 Units /25 mcg) tablet Take 2 Tabs by mouth daily. 3/9/20   Pato Herrera NP  
atorvastatin (LIPITOR) 40 mg tablet Take 1 Tab by mouth daily. 3/9/20   Pato Herrera NP  
warfarin (COUMADIN) 3 mg tablet Take 1 Tab by mouth daily. May take additional tab as directed by provider. 3/9/20   Pato Herrera NP  
albuterol (PROVENTIL HFA, VENTOLIN HFA, PROAIR HFA) 90 mcg/actuation inhaler Take 2 Puffs by inhalation every six (6) hours as needed for Wheezing. 2/26/20   Yonny Sims, NP  
warfarin (COUMADIN) 1 mg tablet Take 3 Tabs by mouth daily. May take additional tab as directed by provider. 2/25/20   Spenser Escobedo NP Allergies Allergen Reactions  Cefepime Other (comments)  
  myoclonus Review of Systems:  Pertinent items are noted in the History of Present Illness. Objective:  
 
Visit Vitals /84 Pulse 94 Temp 97.6 °F (36.4 °C) Resp 16 Ht 6' 2\" (1.88 m) Wt 83.6 kg (184 lb 4.9 oz) SpO2 97% BMI 23.66 kg/m² Temp (24hrs), Av.7 °F (37.1 °C), Min:97.5 °F (36.4 °C), Max:100.6 °F (38.1 °C) Lines:  Peripheral IV:    
 
Physical Exam:  Lungs:  clear to auscultation bilaterally Heart:  regular rate and rhythm Abdomen:  soft, non-tender. Bowel sounds normal. No masses,  no organomegaly Skin:  no rash or abnormalities LVAD site is dry Data Review: CBC: 
Recent Labs  
  20 
0347 20 
1146 WBC 6.2 5.9 GRANS  --  89* MONOS  --  5  
EOS  --  1 ANEU  --  5.2 ABL  --  0.3* HGB 8.6* 9.6* HCT 28.2* 30.4* * 142* BMP: 
Recent Labs  
  20 
0347 20 
1146 CREA 1.16 1.40* BUN 26* 27* * 134* K 4.3 4.2  101 CO2 25 23 AGAP 3* 10  
* 133* LFTS: 
Recent Labs  
  20 
0347 20 
1146 TBILI 0.7 0.7 ALT 17 15 SGOT 18 16 * 168* TP 6.7 7.3 ALB 2.3* 2.6* Microbiology:  
 
All Micro Results Procedure Component Value Units Date/Time [place] Resides [[id number]] Collected:  20 1154 Order Status:  Completed Specimen:  Urine Updated:  20 1045 Special Requests: --     
  NO SPECIAL REQUESTS Reflexed from [id number] Culture result: No growth (<1,000 CFU/ML) CULTURE, BLOOD, PAIRED [[id number]]  (Abnormal) Collected:  20 1204 Order Status:  Completed Specimen:  Blood Updated:  20 0700 Special Requests: NO SPECIAL REQUESTS Culture result:    
  GRAM NEGATIVE RODS GROWING IN 2 OF 4 BOTTLES DRAWN (SITES = L AC AND R AC) REMAINING BOTTLE(S) HAS/HAVE NO GROWTH SO FAR URINE CULTURE HOLD SAMPLE [[id number]] Collected:  20 5309 Order Status:  Completed Specimen:  Serum Updated:  04/20/20 1211 Urine culture hold Urine on hold in Microbiology dept for 2 days. If unpreserved urine is submitted, it cannot be used for addtional testing after 24 hours, recollection will be required. Imaging:  
Reviewed Signed By: Jennifer Tristan MD   
 April 21, 2020

## 2020-04-21 NOTE — PROGRESS NOTES
Problem: Falls - Risk of 
Goal: *Absence of Falls Description: Document Jd Tayler Fall Risk and appropriate interventions in the flowsheet. Outcome: Progressing Towards Goal 
Note: Fall Risk Interventions: 
Mobility Interventions: Communicate number of staff needed for ambulation/transfer Medication Interventions: Evaluate medications/consider consulting pharmacy, Patient to call before getting OOB, Teach patient to arise slowly Elimination Interventions: Call light in reach, Patient to call for help with toileting needs History of Falls Interventions: Evaluate medications/consider consulting pharmacy, Investigate reason for fall, Room close to nurse's station Problem: Patient Education: Go to Patient Education Activity Goal: Patient/Family Education Outcome: Progressing Towards Goal 
  
Problem: Pressure Injury - Risk of 
Goal: *Prevention of pressure injury Description: Document Mcih Scale and appropriate interventions in the flowsheet. Outcome: Progressing Towards Goal 
Note: Pressure Injury Interventions: 
Sensory Interventions: Keep linens dry and wrinkle-free, Maintain/enhance activity level Moisture Interventions: Internal/External urinary devices Activity Interventions: Increase time out of bed, Pressure redistribution bed/mattress(bed type), PT/OT evaluation Mobility Interventions: Pressure redistribution bed/mattress (bed type), HOB 30 degrees or less Nutrition Interventions: Document food/fluid/supplement intake Friction and Shear Interventions: Minimize layers Problem: Patient Education: Go to Patient Education Activity Goal: Patient/Family Education Outcome: Progressing Towards Goal 
  
Problem: Urinary Tract Infection - Adult Goal: *Absence of infection signs and symptoms Outcome: Progressing Towards Goal 
  
Problem: Patient Education: Go to Patient Education Activity Goal: Patient/Family Education Outcome: Progressing Towards Goal 
  
 Problem: Patient Education: Go to Patient Education Activity Goal: Patient/Family Education Outcome: Progressing Towards Goal

## 2020-04-21 NOTE — PROGRESS NOTES
2015: Report received from [address] [place] [address]  
 
2495: Report given to Lindsey Pugh RN and care assumed.

## 2020-04-21 NOTE — PROGRESS NOTES
Problem: Mobility Impaired (Adult and Pediatric) Goal: *Acute Goals and Plan of Care (Insert Text) Description: FUNCTIONAL STATUS PRIOR TO ADMISSION: Patient was modified independent using a rolling walker for functional mobility. HOME SUPPORT PRIOR TO ADMISSION: The patient lived with spouse but did not require assist. 
 
Physical Therapy Goals Initiated 4/21/2020 1. Patient will move from supine to sit and sit to supine  in bed with independence within 7 day(s). 2.  Patient will transfer from bed to chair and chair to bed with modified independence using the least restrictive device within 7 day(s). 3.  Patient will perform sit to stand with modified independence within 7 day(s). 4.  Patient will ambulate with modified independence for 150 feet with the least restrictive device within 7 day(s). 5.  Patient will ascend/descend 2 stairs with 1 handrail(s) with modified independence within 7 day(s). Outcome: Progressing Towards Goal 
 PHYSICAL THERAPY EVALUATION Patient: Courtney Blanco (75 y.o. male) Date: 4/21/2020 Primary Diagnosis: Sepsis ([facility] [A41.9] Precautions: Fall ASSESSMENT Based on the objective data described below, the patient presents with decreased balance, decreased LE strength, and decreased independence with functional mobility S/P admission for sepsis. Patient has an LVAD. He demonstrates the ability to perform battery to wall power switch over with minimal assistance. Patient reports that he has had 3 falls since February. He demonstrates the need for CGA for gait and transfers with the use of a rolling walker. He demonstrates mildly ataxic gait, patient reports tremors with fever, with increased trunk sway. Patient demonstrates poor bilateral foot clearance during gait with decreased endurance. When balance is assessed patient demonstrates the need for constant external support without the support of rolling walker.  He reports that he does not use his RW in the AM. Current Level of Function Impacting Discharge (mobility/balance): CGA for gait and transfers with RW 
 
Functional Outcome Measure: The patient scored 55/100 on the Barthel outcome. Other factors to consider for discharge: medical stability, decreased strength, decreased balance, increased risk for falls, decreased independence Patient will benefit from skilled therapy intervention to address the above noted impairments. PLAN : 
Recommendations and Planned Interventions: bed mobility training, transfer training, gait training, therapeutic exercises, neuromuscular re-education, edema management/control, patient and family training/education, and therapeutic activities Frequency/Duration: Patient will be followed by physical therapy:  5 times a week to address goals. Recommendation for discharge: (in order for the patient to meet his/her long term goals) Physical therapy at least 2 days/week in the home This discharge recommendation: 
Has been made in collaboration with the attending provider and/or case management IF patient discharges home will need the following DME: patient owns DME required for discharge SUBJECTIVE:  
Patient stated i'm doing alright.  OBJECTIVE DATA SUMMARY:  
HISTORY:   
Past Medical History:  
Diagnosis Date Degenerative disc disease, lumbar Heart failure ([facility] High cholesterol Hypertension Paroxysmal atrial fibrillation ([facility] 4/2/2019 Spinal stenosis Past Surgical History:  
Procedure Laterality Date COLONOSCOPY Left 11/11/2019 COLONOSCOPY at bedside performed by Paco Huizar MD at [address] HX CORONARY ARTERY BYPASS GRAFT    
 triple HX HEART ASSIST DEVICE Left 10/29/2019 Impella 5.0 HX HEART ASSIST DEVICE Left 11/18/2019 HeartMate 3  HX HERNIA REPAIR    
 HX IMPLANTABLE CARDIOVERTER DEFIBRILLATOR    
 HX THROMBECTOMY Left 11/25/2019 Left brachial thrombectomy [place] CARDIOVERSION ELECTIVE ARRHYTHMIA EXTERNAL N/A 6/10/2019 EP CARDIOVERSION performed by Angelica Feliciano MD at [address], [facility] Dr CATH LAB  
 [place] CARDIOVERSION ELECTIVE ARRHYTHMIA EXTERNAL N/A 6/18/2019 EP CARDIOVERSION performed by Yaya Wellington MD at [address], [facility] Dr CATH LAB  
 [place] INSJ/RPLCMT PERM DFB W/TRNSVNS LDS 1/DUAL [facility] N/A 6/21/2019 INSERT ICD BIV MULTI performed by Dylan Galvan MD at [address], [facility] [address] CATH LAB  
 [place] TCAT IMPL WRLS P-ART PRS SNR L-T HEMODYN MNTR N/A 9/18/2019 IMPLANT HEART FAILURE MONITORING DEVICE performed by Penelope Angel MD at [address], [facility]/s  CATH LAB Personal factors and/or comorbidities impacting plan of care: LVAD Home Situation Home Environment: Private residence # Steps to Enter: 0 Rails to Enter: No 
One/Two Story Residence: One story Living Alone: No 
Support Systems: Spouse/Significant Other/Partner Patient Expects to be Discharged to[de-identified] Private residence Current DME Used/Available at Home: Walker, rolling EXAMINATION/PRESENTATION/DECISION MAKING:  
Critical Behavior: 
Neurologic State: Alert Orientation Level: Oriented X4 Cognition: Appropriate decision making, Appropriate safety awareness, Appropriate for age attention/concentration, Follows commands Safety/Judgement: Awareness of environment, Insight into deficits Hearing: Auditory Auditory Impairment: None Skin:   
Edema:  
Range Of Motion: 
AROM: Within functional limits(BUE) Strength:   
Strength: Within functional limits(BUE) Tone & Sensation:  
Tone: Normal 
  
  
  
  
Sensation: Intact Coordination: 
Coordination: Within functional limits Vision:  
Tracking: Able to track stimulus in all quadrants w/o difficulty Visual Fields: (able to detect in all fields) Acuity: Within Defined Limits Corrective Lenses: Glasses Functional Mobility: 
Bed Mobility: Transfers: 
Sit to Stand: Contact guard assistance Stand to Sit: Contact guard assistance Balance:  
Sitting: Intact Standing: Impaired Standing - Static: Good;Constant support(RW) 
Standing - Dynamic : Fair Ambulation/Gait Training: 
Distance (ft): 75 Feet (ft) Assistive Device: Gait belt;Walker, rolling Ambulation - Level of Assistance: Contact guard assistance Gait Abnormalities: Decreased step clearance; Ataxic;Trunk sway increased Base of Support: Widened Speed/Jacqueline: Slow Step Length: Left shortened;Right shortened Swing Pattern: Right asymmetrical 
  
  
  
  
  
 Stairs: Therapeutic Exercises:  
 
 
Functional Measure: 
Barthel Index: 
 
Bathin Bladder: 0 Bowels: 10 
Groomin Dressin Feeding: 10 Mobility: 10 Stairs: 0 Toilet Use: 5 Transfer (Bed to Chair and Back): 10 Total: 55/100 The Barthel ADL Index: Guidelines 1. The index should be used as a record of what a patient does, not as a record of what a patient could do. 2. The main aim is to establish degree of independence from any help, physical or verbal, however minor and for whatever reason. 3. The need for supervision renders the patient not independent. 4. A patient's performance should be established using the best available evidence. Asking the patient, friends/relatives and nurses are the usual sources, but direct observation and common sense are also important. However direct testing is not needed. 5. Usually the patient's performance over the preceding 24-48 hours is important, but occasionally longer periods will be relevant. 6. Middle categories imply that the patient supplies over 50 per cent of the effort. 7. Use of aids to be independent is allowed. Mackenzie Odell., Barthel, D.W. (9431). Functional evaluation: the Barthel Index. [facility] (14)2.  
CAROLINA Harkins, Aubree Goodrich., Mode Lombardo., Rupali Benoit. (1999). Measuring the change indisability after inpatient rehabilitation; comparison of the responsiveness of the Barthel Index and Functional [place] Measure. Journal of Neurology, Neurosurgery, and Psychiatry, 66(4), 867-676. RIVAS Hodge, SEVERO Lindsay, & Thais Fletcher M.A. (2004.) Assessment of post-stroke quality of life in cost-effectiveness studies: The usefulness of the Barthel Index and the EuroQoL-5D. [facility], 13, 310-42 Physical Therapy Evaluation Charge Determination History Examination Presentation Decision-Making MEDIUM  Complexity : 1-2 comorbidities / personal factors will impact the outcome/ POC  LOW Complexity : 1-2 Standardized tests and measures addressing body structure, function, activity limitation and / or participation in recreation  MEDIUM Complexity : Evolving with changing characteristics  Other outcome measures Barthel  MEDIUM Based on the above components, the patient evaluation is determined to be of the following complexity level: MEDIUM Pain Rating: 
 
 
Activity Tolerance:  
Fair Please refer to the flowsheet for vital signs taken during this treatment. After treatment patient left in no apparent distress:  
Sitting in chair and Call bell within reach COMMUNICATION/EDUCATION:  
The patients plan of care was discussed with: Occupational therapist and Registered nurse. Fall prevention education was provided and the patient/caregiver indicated understanding., Patient/family have participated as able in goal setting and plan of care. , and Patient/family agree to work toward stated goals and plan of care. Thank you for this referral. 
Faustino Malone, PT Time Calculation: 24 mins

## 2020-04-22 NOTE — PROGRESS NOTES
Notified Noe Kwon of [facility] of the following anticipated home health needs for Sona: skilled nursing, PT & OT. Will await potential IV antibiotic orders and send to infusion company if necessary. Will also touch base with Obey Pineda if IV antibiotic/PICC training becomes necessary. Went to touch base with Dolphus - therapist present for his session. Will attempt to follow up later. Barb Carlisle, MSW, LCSW Clinical  [address]

## 2020-04-22 NOTE — PROGRESS NOTES
0730 Bedside shift change report given to Marquez Mckeon RN (oncoming nurse) by Tori Heaton RN (offgoing nurse). Report included the following information SBAR, Kardex, Intake/Output, MAR and Recent Results. 1530 Post void bladder scan performed per Joshua Pruitt NP. 365 ml shown. No further interventions necessary for < 400 ml.  
 
1930 Bedside shift change report given to Valeri Smith RN (oncoming nurse) by Marquez Mckeon RN (offgoing nurse). Report included the following information SBAR, Kardex, Intake/Output, MAR and Recent Results.

## 2020-04-22 NOTE — PROGRESS NOTES
[facility] VAD/Heart Failure Progress Note Admit Date: 2020 POD:  * No surgery found * Procedure:      
 
 Subjective:  
Mild dyspnea, fatigue, and weakness; abx's for infection Objective:  
Vitals: 
Blood pressure 126/84, pulse 99, temperature 98 °F (36.7 °C), resp. rate 18, height 6' 2\" (1.88 m), weight 83 kg (182 lb 15.7 oz), SpO2 96 %. Temp (24hrs), Av.5 °F (36.9 °C), Min:98 °F (36.7 °C), Max:99 °F (37.2 °C) Hemodynamics: 
 CO:   
 CI:   
 CVP:   
 SVR:   
 PAP Systolic:   
 PAP Diastolic:   
 PVR:   
 CM79:   
 SCV02:   
 
VAD Interrogation: LVAD (Heartmate) Pump Speed (RPM): 5800 Pump Flow (LPM): 5.6 PI (Pulsitility Index): 2 Power: 4.6 MAP: 70  Test: Yes 
Back Up  at Bedside & Labeled: Yes Power Module Test: Yes Driveline Site Care: No 
Driveline Dressing: Clean, Dry, and Intact EKG/Rhythm:   
 
Extubation Date / Time:  
 
CT Output:  
 
Ventilator: 
Ventilator Volumes Vt Spont (ml): 855 ml (20) Ve Observed (l/min): 13.2 l/min (20) Oxygen Therapy: 
Oxygen Therapy O2 Sat (%): 96 % (20) Pulse via Oximetry: 79 beats per minute (20) O2 Device: Room air (20) O2 Flow Rate (L/min): 4 l/min (20) FIO2 (%): 28 % (20) CXR: 
 
Admission Weight: Last Weight Weight: 81.6 kg (180 lb) Weight: 83 kg (182 lb 15.7 oz) Intake / Sienna Beyer / Sol Smalls: 
Current Shift:  0701 -  1900 In: 240 [P.O.:240] Out: - Last 24 hrs.:  
 
Intake/Output Summary (Last 24 hours) at 2020 1419 Last data filed at 2020 0830 Gross per 24 hour Intake 960 ml Output 2950 ml Net -1990 ml No results for input(s): CPK, CKMB, TROIQ in the last 72 hours. Recent Labs  
  20 
0432 20 
0347 20 
1146 * 134* 134* K 4.4 4.3 4.2 CO2 27 25 23 BUN 28* 26* 27* CREA 1.08 1.16 1.40* GLU 96 106* 133* MG 2.1 2.0 1.9 WBC 5.1 6.2 5.9 HGB 9.6* 8.6* 9.6* HCT 31.0* 28.2* 30.4*  133* 142* Recent Labs  
  04/22/20 
0432 04/21/20 
0347 04/20/20 
1151 INR 2.0* 2.0* 1.9* PTP 19.8* 19.8* 18.7* No lab exists for component: PBNP Current Facility-Administered Medications:  
  [START ON 4/23/2020] digoxin (LANOXIN) tablet 0.125 mg, 0.125 mg, Oral, DAILY, Polliard, Rozelle [place], NP 
Ruthann Solum  [START ON 4/23/2020] bumetanide (BUMEX) tablet 1 mg, 1 mg, Oral, DAILY, Polliard, Rozelle [place], NP 
  warfarin (COUMADIN) tablet 4 mg, 4 mg, Oral, ONCE, Polliard, Rozelle [place], NP 
  guar gum (BENEFIBER) packet 1 Packet, 4 g, Oral, TID, Polliard, Rozelle [place], NP 
  albuterol-ipratropium (DUO-NEB) 2.5 MG-0.5 MG/3 ML, 3 mL, Nebulization, Q6H PRN, Levi, Shana B, NP 
  amoxicillin-clavulanate (AUGMENTIN) 875-125 mg per tablet 1 Tab, 1 Tab, Oral, BID, Levi, Shana B, NP, 1 Tab at 04/22/20 1421 
  aspirin delayed-release tablet 81 mg, 81 mg, Oral, DAILY, Levi, Shana B, NP, 81 mg at 04/22/20 0602 
  atorvastatin (LIPITOR) tablet 40 mg, 40 mg, Oral, DAILY, Levi, Shana B, NP, 40 mg at 04/22/20 7345   cholecalciferol (VITAMIN D3) (1000 Units /25 mcg) tablet 2 Tab, 2,000 Units, Oral, DAILY, Levi, Shana B, NP, 2 Tab at 04/22/20 9958   clonazePAM (KlonoPIN) tablet 0.25 mg, 0.25 mg, Oral, BID, Levi, Shana B, NP, 0.25 mg at 04/22/20 6345 
  finasteride (PROSCAR) tablet 5 mg, 5 mg, Oral, DAILY, Levi, Shana B, NP, 5 mg at 04/22/20 2587 
  gentamicin (GARAMYCIN) 0.1 % cream, , Topical, DAILY, Levi, Shana B, NP 
  lactobac ac& pc-s.therm-b.anim (TIAN Q/RISAQUAD), 1 Cap, Oral, DAILY, Levi, Shana B, NP, 1 Cap at 04/22/20 7716   levETIRAcetam (KEPPRA) tablet 250 mg, 250 mg, Oral, Q12H, Levi, Shana B, NP, 250 mg at 04/22/20 0978 
  magnesium oxide (MAG-OX) tablet 800 mg, 800 mg, Oral, BID, Levi, Shana B, NP, 800 mg at 04/22/20 0529   potassium chloride SR (KLOR-CON 10) tablet 10 mEq, 10 mEq, Oral, DAILY, Levi, Shana B, NP, 10 mEq at 04/22/20 0923 
  sacubitriL-valsartan (ENTRESTO) 24-26 mg tablet 1 Tab, 1 Tab, Oral, BID, Levi, Shana B, NP, 1 Tab at 04/22/20 1905   tamsulosin (FLOMAX) capsule 0.8 mg, 0.8 mg, Oral, QHS, Levi, Shana B, NP, 0.8 mg at 04/21/20 2121   therapeutic multivitamin (THERAGRAN) tablet 1 Tab, 1 Tab, Oral, DAILY, Levi, Shana B, NP, 1 Tab at 04/22/20 0922 
  venlafaxine-SR (EFFEXOR-XR) capsule 150 mg, 150 mg, Oral, DAILY WITH BREAKFAST, Levi, Shana B, NP, 150 mg at 04/22/20 0980   sodium chloride (NS) flush 5-40 mL, 5-40 mL, IntraVENous, Q8H, Levi, Shana B, NP, 10 mL at 04/22/20 9879   sodium chloride (NS) flush 5-40 mL, 5-40 mL, IntraVENous, PRN, Levi, Shana B, NP 
  acetaminophen (TYLENOL) tablet 650 mg, 650 mg, Oral, Q4H PRN, Levi, Shana B, NP, 650 mg at 04/20/20 1955   oxyCODONE-acetaminophen (PERCOCET) 5-325 mg per tablet 1 Tab, 1 Tab, Oral, Q4H PRN, Levi, Shana B, NP 
  naloxone (NARCAN) injection 0.4 mg, 0.4 mg, IntraVENous, PRN, Levi, Shana B, NP 
  ondansetron (ZOFRAN) injection 4 mg, 4 mg, IntraVENous, Q4H PRN, Levi, Shana B, NP 
  hydrALAZINE (APRESOLINE) 20 mg/mL injection 10 mg, 10 mg, IntraVENous, Q4H PRN, Levi, Shana B, NP 
  hydrALAZINE (APRESOLINE) 20 mg/mL injection 20 mg, 20 mg, IntraVENous, Q4H PRN, Levi, Shana B, NP 
  meropenem (MERREM) 500 mg in 0.9% sodium chloride (MBP/ADV) 50 mL, 0.5 g, IntraVENous, Q6H, Shana Sims NP, Last Rate: 100 mL/hr at 04/22/20 0926, 500 mg at 04/22/20 9914   Warfarin NP Dosing, , Other, PRN, David Higgins MD 
 
A/P  
 
S/P LVAD - good flows Need for anti-coagulation - coumadin Driveline infection - abx's COPD - nebs Risk of morbidity and mortality - high Medical decision making - high complexity Signed By: Geronimo Ibanez MD

## 2020-04-22 NOTE — PROGRESS NOTES
[facility] VAD/Heart Failure Progress Note Admit Date: 2020 POD:  * No surgery found * Procedure:      
 
 Subjective: Dyspnea, fatigue, and weakness; fever Objective:  
Vitals: 
Blood pressure 126/84, pulse 99, temperature 98 °F (36.7 °C), resp. rate 18, height 6' 2\" (1.88 m), weight 83 kg (182 lb 15.7 oz), SpO2 96 %. Temp (24hrs), Av.5 °F (36.9 °C), Min:98 °F (36.7 °C), Max:99 °F (37.2 °C) Hemodynamics: 
 CO:   
 CI:   
 CVP:   
 SVR:   
 PAP Systolic:   
 PAP Diastolic:   
 PVR:   
 SG08:   
 SCV02:   
 
VAD Interrogation: LVAD (Heartmate) Pump Speed (RPM): 5800 Pump Flow (LPM): 5.6 PI (Pulsitility Index): 2 Power: 4.6 MAP: 70  Test: Yes 
Back Up  at Bedside & Labeled: Yes Power Module Test: Yes Driveline Site Care: No 
Driveline Dressing: Clean, Dry, and Intact EKG/Rhythm:   
 
Extubation Date / Time:  
 
CT Output:  
 
Ventilator: 
Ventilator Volumes Vt Spont (ml): 855 ml (20) Ve Observed (l/min): 13.2 l/min (20) Oxygen Therapy: 
Oxygen Therapy O2 Sat (%): 96 % (20) Pulse via Oximetry: 79 beats per minute (20) O2 Device: Room air (20) O2 Flow Rate (L/min): 4 l/min (20) FIO2 (%): 28 % (20) CXR: 
 
Admission Weight: Last Weight Weight: 81.6 kg (180 lb) Weight: 83 kg (182 lb 15.7 oz) Intake / Noé Castrejon / Veena Katz: 
Current Shift:  07 - 0 In: 240 [P.O.:240] Out: - Last 24 hrs.:  
 
Intake/Output Summary (Last 24 hours) at 2020 1414 Last data filed at 2020 0830 Gross per 24 hour Intake 960 ml Output 2950 ml Net -1990 ml No results for input(s): CPK, CKMB, TROIQ in the last 72 hours. Recent Labs  
  20 
0432 20 
0347 20 
1146 * 134* 134* K 4.4 4.3 4.2 CO2 27 25 23 BUN 28* 26* 27* CREA 1.08 1.16 1.40* GLU 96 106* 133* MG 2.1 2.0 1.9 WBC 5.1 6.2 5.9 HGB 9.6* 8.6* 9.6* HCT 31.0* 28.2* 30.4*  133* 142* Recent Labs  
  04/22/20 
0432 04/21/20 
0347 04/20/20 
1151 INR 2.0* 2.0* 1.9* PTP 19.8* 19.8* 18.7* No lab exists for component: PBNP Current Facility-Administered Medications:  
  [START ON 4/23/2020] digoxin (LANOXIN) tablet 0.125 mg, 0.125 mg, Oral, DAILY, Vishal Herrera NP Jannell Outhouse  [START ON 4/23/2020] bumetanide (BUMEX) tablet 1 mg, 1 mg, Oral, DAILY, Vishal Herrera NP 
  warfarin (COUMADIN) tablet 4 mg, 4 mg, Oral, ONCE, Vishal Herrera NP 
  guar gum (BENEFIBER) packet 1 Packet, 4 g, Oral, TID, Vishal Herrera NP 
  albuterol-ipratropium (DUO-NEB) 2.5 MG-0.5 MG/3 ML, 3 mL, Nebulization, Q6H PRN, Shana Sims, NP 
  amoxicillin-clavulanate (AUGMENTIN) 875-125 mg per tablet 1 Tab, 1 Tab, Oral, BID, Levi, Shana B, NP, 1 Tab at 04/22/20 7922 
  aspirin delayed-release tablet 81 mg, 81 mg, Oral, DAILY, Levi, Shana B, NP, 81 mg at 04/22/20 6424 
  atorvastatin (LIPITOR) tablet 40 mg, 40 mg, Oral, DAILY, Levi, Shana B, NP, 40 mg at 04/22/20 3864   cholecalciferol (VITAMIN D3) (1000 Units /25 mcg) tablet 2 Tab, 2,000 Units, Oral, DAILY, Levi, Shana B, NP, 2 Tab at 04/22/20 9061   clonazePAM (KlonoPIN) tablet 0.25 mg, 0.25 mg, Oral, BID, Levi, Shana B, NP, 0.25 mg at 04/22/20 2813 
  finasteride (PROSCAR) tablet 5 mg, 5 mg, Oral, DAILY, Levi, Shana B, NP, 5 mg at 04/22/20 3089 
  gentamicin (GARAMYCIN) 0.1 % cream, , Topical, DAILY, Levi, Shana B, NP 
  lactobac ac& pc-s.therm-b.anim (TIAN Q/RISAQUAD), 1 Cap, Oral, DAILY, Levi, Shana B, NP, 1 Cap at 04/22/20 9465   levETIRAcetam (KEPPRA) tablet 250 mg, 250 mg, Oral, Q12H, Levi, Shana B, NP, 250 mg at 04/22/20 0923 
  magnesium oxide (MAG-OX) tablet 800 mg, 800 mg, Oral, BID, Levi, Shana B, NP, 800 mg at 04/22/20 7571   potassium chloride SR (KLOR-CON 10) tablet 10 mEq, 10 mEq, Oral, DAILY, Noman Walters B, NP, 10 mEq at 04/22/20 4887 
  sacubitriL-valsartan (ENTRESTO) 24-26 mg tablet 1 Tab, 1 Tab, Oral, BID, Levi, Shana B, NP, 1 Tab at 04/22/20 3675   tamsulosin (FLOMAX) capsule 0.8 mg, 0.8 mg, Oral, QHS, Levi, Shana B, NP, 0.8 mg at 04/21/20 2121   therapeutic multivitamin (THERAGRAN) tablet 1 Tab, 1 Tab, Oral, DAILY, Levi, Shana B, NP, 1 Tab at 04/22/20 0922 
  venlafaxine-SR (EFFEXOR-XR) capsule 150 mg, 150 mg, Oral, DAILY WITH BREAKFAST, Levi, Shana B, NP, 150 mg at 04/22/20 3182   sodium chloride (NS) flush 5-40 mL, 5-40 mL, IntraVENous, Q8H, Levi, Shana B, NP, 10 mL at 04/22/20 4586   sodium chloride (NS) flush 5-40 mL, 5-40 mL, IntraVENous, PRN, Levi, Shana B, NP 
  acetaminophen (TYLENOL) tablet 650 mg, 650 mg, Oral, Q4H PRN, Levi, Shana B, NP, 650 mg at 04/20/20 1955   oxyCODONE-acetaminophen (PERCOCET) 5-325 mg per tablet 1 Tab, 1 Tab, Oral, Q4H PRN, Levi, Shana B, NP 
  naloxone (NARCAN) injection 0.4 mg, 0.4 mg, IntraVENous, PRN, Levi, Shana B, NP 
  ondansetron (ZOFRAN) injection 4 mg, 4 mg, IntraVENous, Q4H PRN, Levi, Shana B, NP 
  hydrALAZINE (APRESOLINE) 20 mg/mL injection 10 mg, 10 mg, IntraVENous, Q4H PRN, Levi, Shana B, NP 
  hydrALAZINE (APRESOLINE) 20 mg/mL injection 20 mg, 20 mg, IntraVENous, Q4H PRN, Levi, Shana B, NP 
  meropenem (MERREM) 500 mg in 0.9% sodium chloride (MBP/ADV) 50 mL, 0.5 g, IntraVENous, Q6H, Shana Sims, NP, Last Rate: 100 mL/hr at 04/22/20 0926, 500 mg at 04/22/20 7909   Warfarin NP Dosing, , Other, PRN, Radha White MD 
 
A/P  
 
S/P LVAD - good flows Need for anti-coagulation - coumadin Driveline infection - abx's COPD - nebs Risk of morbidity and mortality - high Medical decision making - high complexity Signed By: Asad Marquez MD

## 2020-04-22 NOTE — PROGRESS NOTES
ID Progress Note 2020 Subjective:  
 
States that he is feeling some better today Objective: Antibiotics: 1. Meera Nares 2. Augmentin Vitals:  
Visit Vitals /84 Pulse 90 Temp 98.1 °F (36.7 °C) Resp 18 Ht 6' 2\" (1.88 m) Wt 83 kg (182 lb 15.7 oz) SpO2 98% BMI 23.49 kg/m² Tmax:  Temp (24hrs), Av.5 °F (36.9 °C), Min:98 °F (36.7 °C), Max:99 °F (37.2 °C) Exam:  Lungs:  clear to auscultation bilaterally Heart:  regular rate and rhythm Abdomen:  soft, non-tender. Bowel sounds normal. No masses,  no organomegaly Urine is clearing up Labs:     
Recent Labs  
  20 
0432 20 
0347 20 
1146 WBC 5.1 6.2 5.9 HGB 9.6* 8.6* 9.6*  
 133* 142* BUN 28* 26* 27* CREA 1.08 1.16 1.40* SGOT 20 18 16 * 164* 168* TBILI 0.5 0.7 0.7 Cultures: No results found for: SDES Lab Results Component Value Date/Time Culture result: (A) 2020 12:04 PM  
  PSEUDOMONAS AERUGINOSA GROWING IN 2 OF 4 BOTTLES DRAWN (SITES = LAC AND RAC) SENSITIVITY TO FOLLOW Culture result: REMAINING BOTTLE(S) HAS/HAVE NO GROWTH SO FAR 2020 12:04 PM  
 Culture result: No growth (<1,000 CFU/ML) 2020 11:54 AM  
 
 
Radiology:  
 
Line/Insert Date:        
 
Assessment: 1. Pseudomonas bacteremia--line vs urine (history of uti in the past) 2. LVAD drive line infection 3. CHF Objective: 1. Continue current therapy 2. Follow up cultures 3. Hopefully, there will be long term antibiotic options Dolly Evans MD

## 2020-04-22 NOTE — PROGRESS NOTES
[address] [facility] [address] [facility] in [place], [place] Progress Note Patient name: Ольга Kidd Patient : 1950 Patient MRN: [id number] Date of service: 20 CHIEF COMPLAINT: 
Chief Complaint Patient presents with  Urinary Frequency  Fever  Shortness of Breath HPI: Rebecca Kiser is a 71y.o. year old white male with a history of HTN, HLD, JOSE, CAD s/p cardiac arrest VFib s/p CABG () c/b sternal would infection and sternectomy, ischemic cardiomyopathy LVEF 15-20%, s/p BiVICD  who underwent LVAD as DT on 2019 after bridging with Impella 5.0.  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 He was transferred to [facility] rehab on  and discharged from rehab on 2020. Alondra Urrutia was seen  for an LVAD follow up visit. Over the weekend, he developed a fever as high as 100.9 and chills. This was associated with increased urinary frequency and dysuria with foul smelling and discolored urine. Alondra Urrutia had been treated with Cipro since  for pseudomonas urosepsis and resultant bacteremia. Per ID, he was to remain on Cipro for at least 6-12 months, possibly lifelong. According to the patient and his wife, his Cipro was stopped by Urology during a recent outpatient visit. He was referred to the ED, evaluated and admitted to [facility] [place]. He remains on CVSU undergoing treatment for UTI and bacteremia. Recent Events:  
Feels better, denies chills Tmax 99 Excellent diuresis Remains tachycardic INR 2.0 Assessment/Plan: NYHA Class II Sepsis, suspect related to recurrent pseudomonal infection BC  -  bottles + GNR, speciation pending UA suspicious for UTI; but urine culture shows < 1,000 CFU  
 LA normal, PCT trending up Continue Merrem ID consult appreciated CT pelvis negative for prostate abscess ESR 73 from 87, trend daily; May need JACQUE later this week ICM - Stage D,  LVEF 15%, NYHA Class IV improved to NYHA Class III s/p HM3 LVAD as DT on 11/18/19 with Impella 5.0 as bridge to LVAD Continue LVAD speed at 5800 rpm  
 VAD interrogated; frequent PI events, no adverse alarms TTE 4/20 shows large LVIDd, 6.84 cm, RVIDd 3.06 cm, TAPSE 1.15 cm, severely elevated CVP Decrease albumin and Bumex to 1 mg PO daily Increase digoxin to 0.125 mg PO daily to support RV through infection - check daily levels Intolerant to BB d/t RV failure Continue Entresto  24/26 mg PO BID - watch cautiously Intolerant to spironolactone due to IVVD and hyponatremia Dr. Nico Estrella would like to pull sternal wire once stable - will need to wait until active infection has resolved HTN, goal MAP 70-90 mmHg Continue Pierron Alfonso MAPs on LUE only d/t previous right axillary Impella cannulation Chronic anticoagulation, goal INR 1.5-2.0 Continue warfarin and ASA MSSA drive line infection Continue Augmentin 875/125 mg PO BID indefinitely Gentamicin to exit site with daily dressing changes CT x 3 reviewed by Dr. Nico Estrella - small fluid collection adjacent to previous real drain tract has resolved Continue daily dressing changes VT - s/p [facility] BiVICD No recent shocks Follow up with EP as directed COPD Albuterol PRN 
 
JOSE Encouraged patient to be compliant with CPAP therapy CPAP qHS while IP Follow up with Dr. Jerica Jauregui Encouraged increased protein intake Tremors Resolved Continue keppra 250 mg BID Continue clonazepam 0.5 mg BID Significant relief from klonopin - plan to wean keppra once symptoms improved with klonopin - trial as IP  
 
BPH On finasteride and tamsulosin Urology consult pending Abnormal appearance of bladder on CT Depression On Effexor - mg daily Persistently elevated CEA Persistently elevated CEA; PET scan shows increased activity RML of lung. Oncology consult appreciated - PET not suggestive of malignancy Orthostatic Hypotension Improved Encourage use of [employer] PAF Digoxin 0.125 mg PO daily Intolerant of BB d/t RV failure On warfarin Debility Improving Continue PT/OT while inpatient Anemia Check FOB Venofer 100 mg IV x 1 Diarrhea Likely related to recent stool softener use No stool softeners - patient is intolerant Begin Benefiber CARDIAC IMAGING: 
Chest CTA- no outflow graft occlusion Pet Scan equivocal for amyloid 10/25/19 ECHO ADULT COMPLETE 01/29/2020 1/29/2020 Narrative · Severely dilated left ventricle. Upper normal wall thickness. Severe  
systolic dysfunction. Estimated left ventricular ejection fraction is  
<15%. Left ventricular diastolic dysfunction. · Mitral valve thickening. Minimal mitral valve prolapse of the anterior  
mitral valve leaflet. Trace mitral valve regurgitation is present. · Mild tricuspid valve regurgitation is present. · Mildly dilated right ventricle. Moderately reduced RV systolic function (TAPSE 1.3 cm, RV S' 6 cm/s). Pacer/ICD present. · Mildly biatrial enlargement · Severely elevated central venous pressure (15+ mmHg); IVC diameter is  
larger than 21 mm and collapses less than 50% with respiration. · LVAD inflow cannula spectral Doppler tracings not obtained. Signed by: Jose Lin MD  
 
  
  
OTHER IMAGING: 
Head CT negative for acute process EEG suggestive of mild generalized encephalopathic process, possibly related to underlying structural brain injury vs metabolic abnormality Review of Systems Constitutional: Positive for malaise/fatigue. Negative for chills, fever and weight loss. HENT: Positive for hearing loss. Eyes: Negative. Respiratory: Negative. Cardiovascular: Negative. Gastrointestinal: Negative. Genitourinary: Negative for dysuria, frequency, hematuria and urgency. Musculoskeletal: Negative. Skin: Negative. Neurological: Positive for weakness. Endo/Heme/Allergies: Bruises/bleeds easily. Psychiatric/Behavioral: Negative. Visit Vitals /84 Pulse 99 Temp 98 °F (36.7 °C) Resp 18 Ht 6' 2\" (1.88 m) Wt 182 lb 15.7 oz (83 kg) SpO2 96% BMI 23.49 kg/m² LVAD Pump Speed (RPM): 5800 Pump Flow (LPM): 5.6 MAP: 82 
PI (Pulsitility Index): 2 Power: 4.6  Test: Yes 
Back Up  at Bedside & Labeled: Yes Power Module Test: Yes Driveline Site Care: No 
Driveline Dressing: Clean, Dry, and Intact Outpatient: No 
Testing Alarms Reviewed: Yes 
Back up SC speed: 5800 Back up Low Speed Limit: 5400 Emergency Equipment with Patient?: Yes Emergency procedures reviewed?: Yes Drive line site inspected?: No 
Drive line intergrity inspected?: Yes Drive line dressing changed?: No 
 
Results for orders placed or performed during the hospital encounter of 04/20/20 CULTURE, BLOOD, PAIRED Result Value Ref Range Special Requests: NO SPECIAL REQUESTS Culture result: (A) PSEUDOMONAS AERUGINOSA GROWING IN 2 OF 4 BOTTLES DRAWN (SITES = LAC AND RAC) SENSITIVITY TO FOLLOW Culture result: REMAINING BOTTLE(S) HAS/HAVE NO GROWTH SO FAR URINE CULTURE HOLD SAMPLE Result Value Ref Range Urine culture hold Urine on hold in Microbiology dept for 2 days. If unpreserved urine is submitted, it cannot be used for addtional testing after 24 hours, recollection will be required. CULTURE, URINE Result Value Ref Range Special Requests: NO SPECIAL REQUESTS Reflexed from [id number] Culture result: No growth (<1,000 CFU/ML) XR CHEST PORT Narrative INDICATION:  SOB, fatigue, fever EXAM: Single portable view of chest 1201 . Comparison:1/30/2020 Findings: Cardiac silhouette is enlarged. AICD and left ventricular assist 
device unchanged. Pulmonary vasculature is not engorged. Interstitial prominence 
has improved in the interval. Given overlying hardware no new consolidation. No 
pneumothorax or effusion Impression Impression: 1. Enlarged cardiac silhouette, diffuse interstitial prominence improved in the 
interval with no new consolidation CT ABD WO CONT Narrative EXAM: CT ABD WO CONT INDICATION: driveline infection COMPARISON: CT 3/23/2020. Chest x-ray 4/20/2020. TECHNIQUE: Unenhanced thin axial images were obtained through the abdomen. Coronal and sagittal reconstructions were generated. Oral contrast was not 
administered. CT dose reduction was achieved through use of a standardized 
protocol tailored for this examination and automatic exposure control for dose 
modulation. FINDINGS:  
 
LUNG BASES: Clear. INCIDENTALLY IMAGED HEART AND MEDIASTINUM: Enlarged heart with chamber dilation, 
coronary artery calcifications, and AICD device redemonstrated. LIVER: Unremarkable. GALLBLADDER: Unremarkable. SPLEEN: Unremarkable. PANCREAS: No mass or duct dilation. ADRENALS: Unremarkable. KIDNEYS AND VISUALIZED URETERS: No mass, calculus, or hydronephrosis. STOMACH: Unremarkable. VISUALIZED BOWEL: No dilation or wall thickening. PERITONEUM: No ascites or pneumoperitoneum. RETROPERITONEUM: Atherosclerotic calcification with aortic ectasia to 2.6 cm 
infrarenally. No enlarged lymphadenopathy. BONES: Degenerative spine change. No acute fracture or aggressive lesion. ADDITIONAL COMMENTS: Bilateral gynecomastia noted. Impression IMPRESSION: Dilated cardiomegaly with ICD device and additional incidental 
findings as above. No acute intra-abdominal process. CT PELV W CONT Narrative EXAM: CT PELV W CONT INDICATION: evaluate for prostate abscess COMPARISON: None. CONTRAST: 100 mL of Isovue-370. TECHNIQUE:  
Multislice helical CT of the pelvis was performed from the iliac crest to the 
symphysis pubis during uneventful rapid bolus intravenous contrast 
administration. Oral contrast was not administered. Coronal and sagittal 
reformations were generated. CT dose reduction was achieved through use of a 
standardized protocol tailored for this examination and automatic exposure 
control for dose modulation. FINDINGS: 
Prominent sized distal abdominal aorta measuring AP diameter of 2.5 cm. Sigmoid diverticulosis. The prostate is slightly prominent with some minimal calcification but no fluid 
collection. The bladder wall shows some irregularity at the base and mild distention. Impression IMPRESSION: 
Slightly Prominent sized prostate without fluid collection. Irregular bladder floor CBC WITH AUTOMATED DIFF Result Value Ref Range WBC 5.9 4.1 - 11.1 K/uL  
 RBC 3.19 (L) 4.10 - 5.70 M/uL HGB 9.6 (L) 12.1 - 17.0 g/dL HCT 30.4 (L) 36.6 - 50.3 % MCV 95.3 80.0 - 99.0 FL  
 MCH 30.1 26.0 - 34.0 PG  
 MCHC 31.6 30.0 - 36.5 g/dL  
 RDW 16.5 (H) 11.5 - 14.5 % PLATELET 806 (L) 195 - 400 K/uL MPV 9.6 8.9 - 12.9 FL  
 NRBC 0.0 0  WBC ABSOLUTE NRBC 0.00 0.00 - 0.01 K/uL NEUTROPHILS 89 (H) 32 - 75 % LYMPHOCYTES 5 (L) 12 - 49 % MONOCYTES 5 5 - 13 % EOSINOPHILS 1 0 - 7 % BASOPHILS 0 0 - 1 % IMMATURE GRANULOCYTES 0 0.0 - 0.5 % ABS. NEUTROPHILS 5.2 1.8 - 8.0 K/UL  
 ABS. LYMPHOCYTES 0.3 (L) 0.8 - 3.5 K/UL  
 ABS. MONOCYTES 0.3 0.0 - 1.0 K/UL  
 ABS. EOSINOPHILS 0.1 0.0 - 0.4 K/UL  
 ABS. BASOPHILS 0.0 0.0 - 0.1 K/UL  
 ABS. IMM. GRANS. 0.0 0.00 - 0.04 K/UL  
 DF SMEAR SCANNED    
 RBC COMMENTS ANISOCYTOSIS 1+ 
    
 RBC COMMENTS OVALOCYTES 2+ 
    
 RBC COMMENTS SABAS CELLS 1+ WBC COMMENTS RBC FRAGMENTS    
METABOLIC PANEL, COMPREHENSIVE Result Value Ref Range Sodium 134 (L) 136 - 145 mmol/L  Potassium 4.2 3.5 - 5.1 mmol/L  
 Chloride 101 97 - 108 mmol/L  
 CO2 23 21 - 32 mmol/L Anion gap 10 5 - 15 mmol/L Glucose 133 (H) 65 - 100 mg/dL BUN 27 (H) 6 - 20 MG/DL Creatinine 1.40 (H) 0.70 - 1.30 MG/DL  
 BUN/Creatinine ratio 19 12 - 20 GFR est AA >60 >60 ml/min/1.73m2 GFR est non-AA 50 (L) >60 ml/min/1.73m2 Calcium 8.9 8.5 - 10.1 MG/DL Bilirubin, total 0.7 0.2 - 1.0 MG/DL  
 ALT (SGPT) 15 12 - 78 U/L  
 AST (SGOT) 16 15 - 37 U/L Alk. phosphatase 168 (H) 45 - 117 U/L Protein, total 7.3 6.4 - 8.2 g/dL Albumin 2.6 (L) 3.5 - 5.0 g/dL Globulin 4.7 (H) 2.0 - 4.0 g/dL A-G Ratio 0.6 (L) 1.1 - 2.2 SAMPLES BEING HELD Result Value Ref Range SAMPLES BEING HELD 1RED,1BLU   
 COMMENT Add-on orders for these samples will be processed based on acceptable specimen integrity and analyte stability, which may vary by analyte. URINALYSIS W/ REFLEX CULTURE Result Value Ref Range Color DARK YELLOW Appearance CLOUDY (A) CLEAR Specific gravity 1.018 1.003 - 1.030    
 pH (UA) 5.5 5.0 - 8.0 Protein 100 (A) NEG mg/dL Glucose Negative NEG mg/dL Ketone Negative NEG mg/dL Bilirubin Negative NEG Blood LARGE (A) NEG Urobilinogen 1.0 0.2 - 1.0 EU/dL Nitrites Negative NEG Leukocyte Esterase SMALL (A) NEG    
 WBC 10-20 0 - 4 /hpf  
 RBC 10-20 0 - 5 /hpf Epithelial cells FEW FEW /lpf Bacteria 1+ (A) NEG /hpf  
 UA:UC IF INDICATED URINE CULTURE ORDERED (A) CNI Budding yeast PRESENT (A) NEG    
LACTIC ACID Result Value Ref Range Lactic acid 1.8 0.4 - 2.0 MMOL/L  
PROTHROMBIN TIME + INR Result Value Ref Range INR 1.9 (H) 0.9 - 1.1 Prothrombin time 18.7 (H) 9.0 - 11.1 sec PROCALCITONIN Result Value Ref Range Procalcitonin 0.31 ng/mL MAGNESIUM Result Value Ref Range Magnesium 1.9 1.6 - 2.4 mg/dL LD Result Value Ref Range  (H) 85 - 723 U/L  
METABOLIC PANEL, COMPREHENSIVE Result Value Ref Range Sodium 134 (L) 136 - 145 mmol/L Potassium 4.3 3.5 - 5.1 mmol/L Chloride 106 97 - 108 mmol/L  
 CO2 25 21 - 32 mmol/L Anion gap 3 (L) 5 - 15 mmol/L Glucose 106 (H) 65 - 100 mg/dL BUN 26 (H) 6 - 20 MG/DL Creatinine 1.16 0.70 - 1.30 MG/DL  
 BUN/Creatinine ratio 22 (H) 12 - 20 GFR est AA >60 >60 ml/min/1.73m2 GFR est non-AA >60 >60 ml/min/1.73m2 Calcium 8.7 8.5 - 10.1 MG/DL Bilirubin, total 0.7 0.2 - 1.0 MG/DL  
 ALT (SGPT) 17 12 - 78 U/L  
 AST (SGOT) 18 15 - 37 U/L Alk. phosphatase 164 (H) 45 - 117 U/L Protein, total 6.7 6.4 - 8.2 g/dL Albumin 2.3 (L) 3.5 - 5.0 g/dL Globulin 4.4 (H) 2.0 - 4.0 g/dL A-G Ratio 0.5 (L) 1.1 - 2.2 MAGNESIUM Result Value Ref Range Magnesium 2.0 1.6 - 2.4 mg/dL LD Result Value Ref Range  85 - 241 U/L  
CBC W/O DIFF Result Value Ref Range WBC 6.2 4.1 - 11.1 K/uL  
 RBC 2.95 (L) 4.10 - 5.70 M/uL HGB 8.6 (L) 12.1 - 17.0 g/dL HCT 28.2 (L) 36.6 - 50.3 % MCV 95.6 80.0 - 99.0 FL  
 MCH 29.2 26.0 - 34.0 PG  
 MCHC 30.5 30.0 - 36.5 g/dL  
 RDW 16.6 (H) 11.5 - 14.5 % PLATELET 719 (L) 702 - 400 K/uL MPV 9.8 8.9 - 12.9 FL  
 NRBC 0.0 0  WBC ABSOLUTE NRBC 0.00 0.00 - 0.01 K/uL PROTHROMBIN TIME + INR Result Value Ref Range INR 2.0 (H) 0.9 - 1.1 Prothrombin time 19.8 (H) 9.0 - 11.1 sec PROCALCITONIN Result Value Ref Range Procalcitonin 0.61 ng/mL NT-PRO BNP Result Value Ref Range NT pro-BNP 9,988 (H) <125 PG/ML  
OCCULT BLOOD, STOOL Result Value Ref Range Occult blood, stool Negative NEG    
IRON PROFILE Result Value Ref Range Iron 17 (L) 35 - 150 ug/dL TIBC 144 (L) 250 - 450 ug/dL Iron % saturation 12 (L) 20 - 50 % FERRITIN Result Value Ref Range Ferritin 344 26 - 388 NG/ML  
TSH 3RD GENERATION Result Value Ref Range TSH 1.70 0.36 - 3.74 uIU/mL T4, FREE Result Value Ref Range T4, Free 1.4 0.8 - 1.5 NG/DL  
SED RATE (ESR) Result Value Ref Range Sed rate, automated 87 (H) 0 - 20 mm/hr LACTIC ACID Result Value Ref Range Lactic acid 1.5 0.4 - 2.0 MMOL/L  
METABOLIC PANEL, COMPREHENSIVE Result Value Ref Range Sodium 134 (L) 136 - 145 mmol/L Potassium 4.4 3.5 - 5.1 mmol/L Chloride 100 97 - 108 mmol/L  
 CO2 27 21 - 32 mmol/L Anion gap 7 5 - 15 mmol/L Glucose 96 65 - 100 mg/dL BUN 28 (H) 6 - 20 MG/DL Creatinine 1.08 0.70 - 1.30 MG/DL  
 BUN/Creatinine ratio 26 (H) 12 - 20 GFR est AA >60 >60 ml/min/1.73m2 GFR est non-AA >60 >60 ml/min/1.73m2 Calcium 9.1 8.5 - 10.1 MG/DL Bilirubin, total 0.5 0.2 - 1.0 MG/DL  
 ALT (SGPT) 18 12 - 78 U/L  
 AST (SGOT) 20 15 - 37 U/L Alk. phosphatase 164 (H) 45 - 117 U/L Protein, total 6.9 6.4 - 8.2 g/dL Albumin 2.4 (L) 3.5 - 5.0 g/dL Globulin 4.5 (H) 2.0 - 4.0 g/dL A-G Ratio 0.5 (L) 1.1 - 2.2 MAGNESIUM Result Value Ref Range Magnesium 2.1 1.6 - 2.4 mg/dL LD Result Value Ref Range  85 - 241 U/L  
CBC W/O DIFF Result Value Ref Range WBC 5.1 4.1 - 11.1 K/uL  
 RBC 3.26 (L) 4.10 - 5.70 M/uL HGB 9.6 (L) 12.1 - 17.0 g/dL HCT 31.0 (L) 36.6 - 50.3 % MCV 95.1 80.0 - 99.0 FL  
 MCH 29.4 26.0 - 34.0 PG  
 MCHC 31.0 30.0 - 36.5 g/dL  
 RDW 16.5 (H) 11.5 - 14.5 % PLATELET 249 353 - 266 K/uL MPV 9.7 8.9 - 12.9 FL  
 NRBC 0.0 0  WBC ABSOLUTE NRBC 0.00 0.00 - 0.01 K/uL PROTHROMBIN TIME + INR Result Value Ref Range INR 2.0 (H) 0.9 - 1.1 Prothrombin time 19.8 (H) 9.0 - 11.1 sec PROCALCITONIN Result Value Ref Range Procalcitonin 0.67 ng/mL NT-PRO BNP Result Value Ref Range NT pro-BNP 3,559 (H) <125 PG/ML  
DIGOXIN Result Value Ref Range Digoxin level 0.2 (L) 0.90 - 2.00 NG/ML  
LACTIC ACID Result Value Ref Range Lactic acid 1.2 0.4 - 2.0 MMOL/L  
SED RATE (ESR) Result Value Ref Range Sed rate, automated 73 (H) 0 - 20 mm/hr IP CONSULT TO INFECTIOUS DISEASES Narrative Lily Blanco MD     4/21/2020  1:38 PM 
Infectious Disease Consult Today's Date: 4/21/2020 Admit Date: 4/20/2020 Impression: · Chronic LVAD drive line infection · Pseudomonas UTI with bacteremia--planned on extended course of  
antibiotic therapy, but this was stopped in the outpatient  
setting by urology · Now with GNR in blood culture Plan: · IV antibiotic therapy · Follow up blood culture results and adjust medications as  
needed Anti-infectives: · Merrem · Amoxicillin Subjective:  
Date of Consultation:  April 21, 2020 Referring Physician: Dr Ramiro Hernandez Patient is a 71 y.o. male admitted with fevers and foul smelling  
urine. He is known to me from care given over the winter for LVAD  
drive line infection and Pseudomonas UTI. He became ill a couple  
of days ago with fevers and change in his urine. He is admitted  
for this and blood cultures are growing GNR. He is on IV  
antibiotic therapy and we are asked to see him in consultation. Patient Active Problem List  
Diagnosis Code  Paroxysmal atrial fibrillation ([facility]) I48.0  Acute on chronic systolic CHF (congestive heart failure) ([address] I50.23  Systolic CHF, chronic ([facility]) I50.22  
 Peripheral vascular disease ([facility]) I73.9  Acute decompensated heart failure ([facility]) I50.9  Tremor R25.1  Chronic anticoagulation Z79.01  
 LVAD (left ventricular assist device) present ([address] Z95.811  Sepsis ([address] A41.9 Past Medical History:  
Diagnosis Date  Degenerative disc disease, lumbar  Heart failure ([address]  High cholesterol  Hypertension  Paroxysmal atrial fibrillation ([address] 4/2/2019  Spinal stenosis Family History Problem Relation Age of Onset  Lung Disease Mother  Hypertension Mother [facility] Arthritis-osteo Mother  Heart Disease Mother  Heart Disease Father  Diabetes Father Social History Tobacco Use  Smoking status: Former Smoker Last attempt to quit: 2010 Years since quittin.3  Smokeless tobacco: Never Used Substance Use Topics  Alcohol use: Not Currently Comment: rarely Past Surgical History:  
Procedure Laterality Date  COLONOSCOPY Left 2019 COLONOSCOPY at bedside performed by Shivam Ordaz MD at  
[address]  HX CORONARY ARTERY BYPASS GRAFT    
 triple  HX HEART ASSIST DEVICE Left 10/29/2019 Impella 5.0  
 HX HEART ASSIST DEVICE Left 2019 HeartMate 3  
 HX HERNIA REPAIR    
 HX IMPLANTABLE CARDIOVERTER DEFIBRILLATOR  HX THROMBECTOMY Left 2019 Left brachial thrombectomy  SC CARDIOVERSION ELECTIVE ARRHYTHMIA EXTERNAL N/A 6/10/2019 EP CARDIOVERSION performed by Kristy Julio MD at [place] [id number], [facility]/[address]  
CATH LAB  SC CARDIOVERSION ELECTIVE ARRHYTHMIA EXTERNAL N/A 2019 EP CARDIOVERSION performed by Andrzej Perez MD at [address] [facility] CATH LAB  SC INSJ/RPLCMT PERM DFB W/TRNSVNS LDS 1/DUAL CHMBR N/A  
2019 INSERT ICD BIV MULTI performed by Francy Cuba MD at [facility] CATH LAB  SC TCAT IMPL WRLS P-ART PRS SNR L-T HEMODYN MNTR N/A 2019 IMPLANT HEART FAILURE MONITORING DEVICE performed by Aminta Richardson MD at [facility] Prior to Admission medications Medication Sig Start Date End Date Taking? Authorizing Provider  
budesonide (PULMICORT) 0.5 mg/2 mL nbsp 500 mcg by Nebulization  
route every evening. Yes Provider, Historical  
albuterol-ipratropium (DUO-NEB) 2.5 mg-0.5 mg/3 ml nebu 3 mL by  
Nebulization route nightly. Yes Provider, Historical  
potassium chloride SR (KLOR-CON 10) 10 mEq tablet Take 1 Tab by  
mouth daily. 20   Madelin Herrera NP  
clonazePAM (KlonoPIN) 0.5 mg tablet Take 0.5 Tabs by mouth two  
(2) times a day.  Max Daily Amount: 0.5 mg. 20   Madelin Herrera NP  
sacubitriL-valsartan Valri Sendy) 24-26 mg tablet Take 1 Tab by  
 mouth two (2) times a day. 4/7/20   Shital Herrera NP  
amoxicillin-clavulanate (AUGMENTIN) 875-125 mg per tablet Take 1 Tab by mouth two (2) times a day. 3/25/20   Cristina Sims NP  
tamsulosin (Flomax) 0.4 mg capsule Take 0.8 mg by mouth nightly. Provider, Historical  
gentamicin (GARAMYCIN) 0.1 % topical ointment Apply to exit site  
daily. 3/16/20   Shital Herrera NP  
aspirin delayed-release 81 mg tablet Take 1 Tab by mouth daily. 3/9/20   Shital Herrera NP  
venlafaxine-SR [facility] 150 mg capsule Take 1 Cap by mouth  
daily (with breakfast). 3/9/20   Shital Herrera NP  
therapeutic multivitamin [facility]) tablet Take 1 Tab by mouth  
daily. 3/9/20   Shital Herrera NP  
magnesium oxide (MAG-OX) 400 mg tablet Take 2 Tabs by mouth two  
(2) times a day. 3/9/20   Shital Herrera NP  
levETIRAcetam (KEPPRA) 250 mg tablet Take 1 Tab by mouth two (2)  
times a day. 3/9/20   Shital Herrera NP  
L. acidoph & paracasei- S therm- Bifido (TIAN-Q/RISAQUAD) 8  
billion cell cap cap Take 1 Cap by mouth daily. 3/9/20 Zaria Gerard, TIERRA  
finasteride (PROSCAR) 5 mg tablet Take 1 Tab by mouth daily. 3/9/20   Shital Herrera NP  
cholecalciferol (VITAMIN D3) (1000 Units /25 mcg) tablet Take 2 Tabs by mouth daily. 3/9/20   Shital Herrera NP  
atorvastatin (LIPITOR) 40 mg tablet Take 1 Tab by mouth daily. 3/9/20   Shital Herrera NP  
warfarin (COUMADIN) 3 mg tablet Take 1 Tab by mouth daily. May  
take additional tab as directed by provider. 3/9/20   Polliard,  
[place] Alfonso, NP  
albuterol (PROVENTIL HFA, VENTOLIN HFA, PROAIR HFA) 90  
mcg/actuation inhaler Take 2 Puffs by inhalation every six (6)  
hours as needed for Wheezing. 2/26/20   Cristina Sims, NP  
warfarin (COUMADIN) 1 mg tablet Take 3 Tabs by mouth daily. May  
take additional tab as directed by provider. 2/25/20 Shayla Toussaint NP Allergies Allergen Reactions  Cefepime Other (comments)  
  myoclonus Review of Systems:  Pertinent items are noted in the History of  
Present Illness. Objective:  
 
Visit Vitals /84 Pulse 94 Temp 97.6 °F (36.4 °C) Resp 16 Ht 6' 2\" (1.88 m) Wt 83.6 kg (184 lb 4.9 oz) SpO2 97% BMI 23.66 kg/m² Temp (24hrs), Av.7 °F (37.1 °C), Min:97.5 °F (36.4 °C), Max:100.6 °F (38.1 °C) Lines:  Peripheral IV:    
 
Physical Exam:  Lungs:  clear to auscultation bilaterally Heart:  regular rate and rhythm Abdomen:  soft, non-tender. Bowel sounds normal. No masses,  no  
organomegaly Skin:  no rash or abnormalities LVAD site is dry Data Review: CBC: 
Recent Labs  
  20 
03420 
1146 WBC 6.2 5.9 GRANS  --  89* MONOS  --  5  
EOS  --  1 ANEU  --  5.2 ABL  --  0.3* HGB 8.6* 9.6* HCT 28.2* 30.4* * 142* BMP: 
Recent Labs  
  20 
0347 20 
1146 CREA 1.16 1.40* BUN 26* 27* * 134* K 4.3 4.2  101 CO2 25 23 AGAP 3* 10  
* 133* LFTS: 
Recent Labs  
  20 
0347 20 
1146 TBILI 0.7 0.7 ALT 17 15 SGOT 18 16 * 168* TP 6.7 7.3 ALB 2.3* 2.6* Microbiology:  
 
All Micro Results Procedure Component Value Units Date/Time Mikayla Lay [[id number]] Collected:  20 1154 Order Status:  Completed Specimen:  Urine Updated:  20  
1045 Special Requests: --     
  NO SPECIAL REQUESTS Reflexed from [id number] Culture result: No growth (<1,000 CFU/ML) CULTURE, BLOOD, PAIRED [[id number]]  (Abnormal) Collected:   
20 1204 Order Status:  Completed Specimen:  Blood Updated:  20  
0700 Special Requests: NO SPECIAL REQUESTS Culture result:    
  GRAM NEGATIVE RODS GROWING IN 2 OF 4 BOTTLES DRAWN (SITES = L  
AC AND R AC) REMAINING BOTTLE(S) HAS/HAVE NO GROWTH SO FAR URINE CULTURE HOLD SAMPLE [[id number]] Collected:  20 1157 Order Status:  Completed Specimen:  Serum Updated:  04/20/20  
1211 Urine culture hold Urine on hold in Microbiology dept for 2 days. If unpreserved  
urine is submitted, it cannot be used for addtional testing after 24 hours, recollection will be required. Imaging:  
Reviewed Signed By: Anthony Robledo MD   
 April 21, 2020 EKG, 12 LEAD, INITIAL Result Value Ref Range Ventricular Rate 121 BPM  
 Atrial Rate 121 BPM  
 QRS Duration 20 ms  
 Q-T Interval 326 ms  
 QTC Calculation (Bezet) 462 ms Calculated R Axis 91 degrees Calculated T Axis 147 degrees Diagnosis ** Poor data quality, interpretation may be adversely affected Ventricular-paced rhythm Biventricular pacemaker detected When compared with ECG of 22-DEC-2019 18:48, 
Vent. rate has increased BY  43 BPM 
Confirmed by Zhang Lucero M.D., Opal Goodman ([id number]) on 4/20/2020 3:02:00 PM 
  
ECHO ADULT COMPLETE Result Value Ref Range Tapse 1.15 (A) 1.5 - 2.0 cm Ao Root D 3.95 cm LVIDd 6.84 (A) 4.2 - 5.9 cm  
 LVPWd 0.90 0.6 - 1.0 cm LVIDs 6.26 cm IVSd 0.82 0.6 - 1.0 cm Left Atrium to Aortic Root Ratio 1.25   
 RVIDd 3.06 cm  
 LV Mass .0 (A) 88 - 224 g LV Mass AL Index 147.3 49 - 115 g/m2 Left Atrium Major Axis 4.94 cm Triscuspid Valve Regurgitation Peak Gradient 37.4 mmHg Pulmonic Valve Max Velocity 100.20 cm/s  
 TR Max Velocity 305.97 cm/s Left Ventricular Fractional Shortening by 2D [phone] % PV peak gradient 4.0 mmHg Narrative · Image quality for this study was technically difficult. · Normal wall thickness. Severely dilated left ventricle. Severe systolic  
function. Estimated left ventricular ejection fraction is <15%. LVAD  
present. 5800 rpm 
· Dilated left atrium. · RV Not well visualized. Mildly reduced systolic function. · Mild aortic valve regurgitation is present. Aortic valve does not open  
in systole. · Mild mitral valve regurgitation is present. · Mild to moderate tricuspid valve regurgitation is present. · Severely elevated central venous pressure (15+ mmHg); IVC diameter is  
larger than 21 mm and collapses less than 50% with respiration. Physical Exam  
Constitutional: He is oriented to person, place, and time. He appears well-developed and well-nourished. No distress. HENT:  
Head: Normocephalic. Eyes: Pupils are equal, round, and reactive to light. Neck: Normal range of motion. No JVD present. Cardiovascular: Regular rhythm and normal heart sounds. Tachycardia present. + VAD sounds Pulmonary/Chest: Effort normal. No respiratory distress. Abdominal: Soft. Bowel sounds are normal. He exhibits no distension. Musculoskeletal: Normal range of motion. General: No edema. Neurological: He is alert and oriented to person, place, and time. Skin: Skin is warm. He is not diaphoretic. Psychiatric: He has a normal mood and affect. Nursing note and vitals reviewed. PAST MEDICAL HISTORY: 
Past Medical History:  
Diagnosis Date  Degenerative disc disease, lumbar  Heart failure ([address]  High cholesterol  Hypertension  Paroxysmal atrial fibrillation ([address] 4/2/2019  Spinal stenosis PAST SURGICAL HISTORY: 
Past Surgical History:  
Procedure Laterality Date  COLONOSCOPY Left 11/11/2019 COLONOSCOPY at bedside performed by Emilia Mak MD at [facility]  HX CORONARY ARTERY BYPASS GRAFT    
 triple  HX HEART ASSIST DEVICE Left 10/29/2019 Impella 5.0  
 HX HEART ASSIST DEVICE Left 11/18/2019 HeartMate 3  
 HX HERNIA REPAIR    
 HX IMPLANTABLE CARDIOVERTER DEFIBRILLATOR  HX THROMBECTOMY Left 11/25/2019 Left brachial thrombectomy  CA CARDIOVERSION ELECTIVE ARRHYTHMIA EXTERNAL N/A 6/10/2019  EP CARDIOVERSION performed by Bozena Forrester MD at [place] [address], [facility]  CATH LAB  
  NC CARDIOVERSION ELECTIVE ARRHYTHMIA EXTERNAL N/A 2019 EP CARDIOVERSION performed by Cheryle Dago, MD at [address], [facility] [address] CATH LAB  NC INSJ/RPLCMT PERM DFB W/TRNSVNS LDS 1/DUAL CHMBR N/A 2019 INSERT ICD BIV MULTI performed by Fer Wood MD at [address] [facility] [address] CATH LAB  NC TCAT IMPL WRLS P-ART PRS SNR L-T HEMODYN MNTR N/A 2019 IMPLANT HEART FAILURE MONITORING DEVICE performed by Marcial Mast MD at [address], [facility]  CATH LAB FAMILY HISTORY: 
Family History Problem Relation Age of Onset  Lung Disease Mother  Hypertension Mother Shilo Dong Arthritis-osteo Mother  Heart Disease Mother  Heart Disease Father  Diabetes Father SOCIAL HISTORY: 
Social History Socioeconomic History  Marital status:  Spouse name: Not on file  Number of children: Not on file  Years of education: Not on file  Highest education level: Not on file Tobacco Use  Smoking status: Former Smoker Last attempt to quit: 2010 Years since quittin.3  Smokeless tobacco: Never Used Substance and Sexual Activity  Alcohol use: Not Currently Comment: rarely  Drug use: Never Other Topics Concern ALLERGY: 
Allergies Allergen Reactions  Cefepime Other (comments)  
  myoclonus CURRENT MEDICATIONS: 
 
Current Facility-Administered Medications:  
  [START ON 2020] digoxin (LANOXIN) tablet 0.125 mg, 0.125 mg, Oral, DAILY, Keiry Herrera, TIERRA 
Shilo Dong  [START ON 2020] bumetanide (BUMEX) tablet 1 mg, 1 mg, Oral, DAILY, Keiry Herrera, NP 
  warfarin (COUMADIN) tablet 4 mg, 4 mg, Oral, ONCE, Keiry Herrera NP 
  guar gum (BENEFIBER) packet 1 Packet, 4 g, Oral, TID, Keiry Herrera, TIERRA 
  albuterol-ipratropium (DUO-NEB) 2.5 MG-0.5 MG/3 ML, 3 mL, Nebulization, Q6H PRN, Levi, Erin B, NP 
  amoxicillin-clavulanate (AUGMENTIN) 875-125 mg per tablet 1 Tab, 1 Tab, Oral, BID, Levi, Shana B, NP, 1 Tab at 04/22/20 9365 
  aspirin delayed-release tablet 81 mg, 81 mg, Oral, DAILY, Levi, Shana B, NP, 81 mg at 04/22/20 4368 
  atorvastatin (LIPITOR) tablet 40 mg, 40 mg, Oral, DAILY, Levi, Shana B, NP, 40 mg at 04/22/20 8048   cholecalciferol (VITAMIN D3) (1000 Units /25 mcg) tablet 2 Tab, 2,000 Units, Oral, DAILY, Levi, Shana B, NP, 2 Tab at 04/22/20 9798   clonazePAM (KlonoPIN) tablet 0.25 mg, 0.25 mg, Oral, BID, Levi, Shana B, NP, 0.25 mg at 04/22/20 8031 
  finasteride (PROSCAR) tablet 5 mg, 5 mg, Oral, DAILY, Levi, Shana B, NP, 5 mg at 04/22/20 5667 
  gentamicin (GARAMYCIN) 0.1 % cream, , Topical, DAILY, Levi, Shana B, NP 
  lactobac ac& pc-s.therm-b.anim (TIAN Q/RISAQUAD), 1 Cap, Oral, DAILY, Levi, Shana B, NP, 1 Cap at 04/22/20 7214   levETIRAcetam (KEPPRA) tablet 250 mg, 250 mg, Oral, Q12H, Levi, Shana B, NP, 250 mg at 04/22/20 0923 
  magnesium oxide (MAG-OX) tablet 800 mg, 800 mg, Oral, BID, Levi, Shana B, NP, 800 mg at 04/22/20 3421   potassium chloride SR (KLOR-CON 10) tablet 10 mEq, 10 mEq, Oral, DAILY, Levi, Shana B, NP, 10 mEq at 04/22/20 0923 
  sacubitriL-valsartan (ENTRESTO) 24-26 mg tablet 1 Tab, 1 Tab, Oral, BID, Levi, Shana B, NP, 1 Tab at 04/22/20 3050   tamsulosin (FLOMAX) capsule 0.8 mg, 0.8 mg, Oral, QHS, Levi, Shana B, NP, 0.8 mg at 04/21/20 2121   therapeutic multivitamin (THERAGRAN) tablet 1 Tab, 1 Tab, Oral, DAILY, Levi, Shana B, NP, 1 Tab at 04/22/20 0922 
  venlafaxine-SR (EFFEXOR-XR) capsule 150 mg, 150 mg, Oral, DAILY WITH BREAKFAST, Levi, Shana B, NP, 150 mg at 04/22/20 7830   sodium chloride (NS) flush 5-40 mL, 5-40 mL, IntraVENous, Q8H, Levi, Shana B, NP, 10 mL at 04/22/20 1698   sodium chloride (NS) flush 5-40 mL, 5-40 mL, IntraVENous, PRN, Levi, Shana B, NP 
  acetaminophen (TYLENOL) tablet 650 mg, 650 mg, Oral, Q4H PRN, Lisandra BHAKTA NP, 650 mg at 04/20/20 8605   oxyCODONE-acetaminophen (PERCOCET) 5-325 mg per tablet 1 Tab, 1 Tab, Oral, Q4H PRN, Levi, Shana B, NP 
  naloxone (NARCAN) injection 0.4 mg, 0.4 mg, IntraVENous, PRN, Levi, Shana B, NP 
  ondansetron (ZOFRAN) injection 4 mg, 4 mg, IntraVENous, Q4H PRN, Levi, Shana B, NP 
  hydrALAZINE (APRESOLINE) 20 mg/mL injection 10 mg, 10 mg, IntraVENous, Q4H PRN, Levi, Shana B, NP 
  hydrALAZINE (APRESOLINE) 20 mg/mL injection 20 mg, 20 mg, IntraVENous, Q4H PRN, Levi, Shana B, NP 
  meropenem (MERREM) 500 mg in 0.9% sodium chloride (MBP/ADV) 50 mL, 0.5 g, IntraVENous, Q6H, Levi, Shana B, NP, Last Rate: 100 mL/hr at 04/22/20 0926, 500 mg at 04/22/20 8631   Warfarin NP Dosing, , Other, PRN, Maudine MD Jose Elias 
 
 
Thank you for letting us see him with you, TIERRA Mg [address], [address] Office [phone] Fax [phone] [facility] ATTENDING ADDENDUM Patient was seen and examined in person. Data and notes were reviewed. I have discussed and agree with the plan as noted in the NP note above without further additions. Fifi Alcantara MD PhD 
[address] [facility]

## 2020-04-22 NOTE — PROGRESS NOTES
Problem: Mobility Impaired (Adult and Pediatric) Goal: *Acute Goals and Plan of Care (Insert Text) Description: FUNCTIONAL STATUS PRIOR TO ADMISSION: Patient was modified independent using a rolling walker for functional mobility. HOME SUPPORT PRIOR TO ADMISSION: The patient lived with spouse but did not require assist. 
 
Physical Therapy Goals Initiated 4/21/2020 1. Patient will move from supine to sit and sit to supine  in bed with independence within 7 day(s). 2.  Patient will transfer from bed to chair and chair to bed with modified independence using the least restrictive device within 7 day(s). 3.  Patient will perform sit to stand with modified independence within 7 day(s). 4.  Patient will ambulate with modified independence for 150 feet with the least restrictive device within 7 day(s). 5.  Patient will ascend/descend 2 stairs with 1 handrail(s) with modified independence within 7 day(s). Outcome: Progressing Towards Goal 
 
PHYSICAL THERAPY TREATMENT Patient: Nikos Leal (75 y.o. male) Date: 4/22/2020 [facility] [A41.9] <principal problem not specified> Precautions:   
Chart, physical therapy assessment, plan of care and goals were reviewed. ASSESSMENT Patient continues with skilled PT services and is progressing towards goals. Pt agreeable to gait. Pt was able to increase gait tolerance. Pt had periods of kicking the walker. Pt could benefit from walking with nursing to maintain strength and independence. Current Level of Function Impacting Discharge (mobility/balance):CGA Other factors to consider for discharge: activity tolerance PLAN : 
Patient continues to benefit from skilled intervention to address the above impairments. Continue treatment per established plan of care. to address goals. Recommendation for discharge: (in order for the patient to meet his/her long term goals) Physical therapy at least 2 days/week in the home AND ensure assist and/or supervision for safety with functional mobility as needed This discharge recommendation: 
Has not yet been discussed the attending provider and/or case management IF patient discharges home will need the following DME: patient owns DME required for discharge SUBJECTIVE:  
Patient stated Are you ready.  OBJECTIVE DATA SUMMARY:  
Critical Behavior: 
Neurologic State: Alert, Appropriate for age Orientation Level: Oriented X4 Cognition: Appropriate for age attention/concentration, Appropriate decision making, Follows commands Safety/Judgement: Awareness of environment, Decreased awareness of need for assistance, Decreased awareness of need for safety, Fall prevention, Insight into deficits Functional Mobility Training: 
Bed Mobility: 
  
  
  
Scooting: Modified independent Transfers: 
Sit to Stand: Contact guard assistance Stand to Sit: Contact guard assistance Balance: 
Sitting: Intact Standing: Impaired; With support Standing - Static: Fair Standing - Dynamic : Fair Ambulation/Gait Training: 
Distance (ft): 120 Feet (ft) Assistive Device: Gait belt;Walker, rolling Ambulation - Level of Assistance: Contact guard assistance Gait Abnormalities: Decreased step clearance Base of Support: Center of gravity altered; Widened Speed/Jacqueline: Pace decreased (<100 feet/min) Step Length: Left shortened;Right shortened Stairs: Therapeutic Exercises:  
 
Pain Rating: No complaints Activity Tolerance:  
Limited Please refer to the flowsheet for vital signs taken during this treatment. After treatment patient left in no apparent distress:  
Sitting in chair and Call bell within reach COMMUNICATION/COLLABORATION:  
The patients plan of care was discussed with: Registered nurse. Michael Coelho PTA Time Calculation: 13 mins

## 2020-04-22 NOTE — PROGRESS NOTES
Problem: Self Care Deficits Care Plan (Adult) Goal: *Acute Goals and Plan of Care (Insert Text) Description:  
FUNCTIONAL STATUS PRIOR TO ADMISSION: Patient with history of LVAD placement 11/2019, then d/c to [facility] rehab, then d/c home with wife in 2/2020. Patient reports he had progressed to modified independent level for ADLs and mobility, using RW only when fatigued, and endorses 3 falls since returning home. Patient reports he performed LVAD switchovers without assistance at home, that he had not progressed to showering yet, and he was active, for example mowing the lawn with a riding mower. HOME SUPPORT: Patient lived with his wife but did not require assistance Occupational Therapy Goals Initiated 4/21/2020 1. Patient will perform grooming standing at sink with with modified independence within 7 day(s). 2.  Patient will perform lower body dressing with modified independence within 7 day(s). 3.  Patient will perform bathing with modified independence within 7 day(s). 4.  Patient will perform toilet transfers with modified independence within 7 day(s). 5.  Patient will perform all aspects of toileting with modified independence within 7 day(s). 6.  Patient will participate in upper extremity therapeutic exercise/activities with independence for 10 minutes within 7 day(s). 7.  Patient will utilize energy conservation techniques during functional activities with verbal cues within 7 day(s). 8.  Patient will utilize fall prevention techniques during functional activities with verbal cues within 7 days. Outcome: Progressing Towards Goal 
  
OCCUPATIONAL THERAPY TREATMENT Patient: Dontae Wayne (75 y.o. male) Date: 4/22/2020 [facility] [A41.9] <principal problem not specified> Precautions:  fall Chart, occupational therapy assessment, plan of care, and goals were reviewed. ASSESSMENT Patient continues with skilled OT services and is progressing towards goals. He was up in bathroom without assist upon arrival stating due to bowel urgency. He required CGA for toileting, hand washing standing at sink and amb using RW due to impaired balance and tremors. He performed power exchange PM to batteries with min verbal instruction. Continue to recommend [facility] therapy and family assist for safety at discharge. Current Level of Function Impacting Discharge (ADLs): CGA for toileting, hand washing standing at sink and amb using RW due to impaired balance and tremors Other factors to consider for discharge: pt will require 24/7 assist for safety at discharge PLAN : 
Patient continues to benefit from skilled intervention to address the above impairments. Continue treatment per established plan of care. to address goals. Recommend with staff: Recommend with nursing patient to complete as able in order to maintain strength, endurance and independence: ADLs with CGA/setup and OOB to chair 3x/day and mobilizing to the bathroom for toileting with CG assist. Thank you for your assistance. Recommend next OT session: standing endurance/balance, BUE AROM exer in standing Recommendation for discharge: (in order for the patient to meet his/her long term goals) Occupational therapy at least 2 days/week in the home AND ensure assist and/or supervision for safety This discharge recommendation: 
Has been made in collaboration with the attending provider and/or case management IF patient discharges home will need the following DME: TBD SUBJECTIVE:  
Patient stated When you gotta go you gotta go.  OBJECTIVE DATA SUMMARY:  
Cognitive/Behavioral Status: 
Neurologic State: Alert; Appropriate for age Orientation Level: Oriented X4 Cognition: Appropriate for age attention/concentration; Appropriate decision making; Follows commands Perception: Appears intact Perseveration: No perseveration noted Safety/Judgement: Awareness of environment;Decreased awareness of need for assistance;Decreased awareness of need for safety; Fall prevention; Insight into deficits Functional Mobility and Transfers for ADLs: 
Bed Mobility: 
Scooting: Modified independent Transfers: 
Sit to Stand: Contact guard assistance Functional Transfers Bathroom Mobility: Contact guard assistance Toilet Transfer : Contact guard assistance Cues: Tactile cues provided;Verbal cues provided;Visual cues provided Adaptive Equipment: Grab bars; Walker (comment)(rolling) Balance: 
Sitting: Intact Standing: Impaired; With support Standing - Static: Fair Standing - Dynamic : Fair ADL Intervention: 
Grooming Grooming Assistance: Contact guard assistance Position Performed: Standing Washing Hands: Contact guard assistance Cues: Tactile cues provided;Verbal cues provided;Visual cues provided Adaptive Equipment: (RW) Lower Body Dressing Assistance Socks: Supervision;Set-up Position Performed: Bending forward method;Seated in chair Cues: Don;Doff;Verbal cues provided Adaptive Equipment Used: [place] Toileting Toileting Assistance: Contact guard assistance Bladder Hygiene: Modified independent Bowel Hygiene: Modified indpendent Clothing Management: Contact guard assistance Cues: Tactile cues provided;Verbal cues provided;Visual cues provided Adaptive Equipment: Walker;Grab bars Cognitive Retraining Safety/Judgement: Awareness of environment;Decreased awareness of need for assistance;Decreased awareness of need for safety; Fall prevention; Insight into deficits Therapeutic Exercises:  
Re-educated pt on benefits to exer to increase his strength and endurance for all functional tasks. In standing pt performed 15 reps shoulder flexion, horizontal abduction, abduction and chest presses with seated rest breaks after each exer due to decreased activity tolerance. Encouraged pt to perform BUE AROM exer at least 2x day and he agreed. Pain: 
No c/o pain Activity Tolerance:  
Fair and requires frequent rest breaks Please refer to the flowsheet for vital signs taken during this treatment. After treatment patient left in no apparent distress:  
Sitting in chair and Call bell within reach COMMUNICATION/COLLABORATION:  
The patients plan of care was discussed with: Registered nurse. Ollie Pereira OT Time Calculation: 32 mins

## 2020-04-22 NOTE — ACP (ADVANCE CARE PLANNING)
[facility] VAD/Heart Failure Progress Note Admit Date: 2020 POD:  * No surgery found * Procedure:      
 
 Subjective: Dyspnea, fatigue, and weakness; abx's for driveline Objective:  
Vitals: 
Blood pressure 126/84, pulse 99, temperature 98 °F (36.7 °C), resp. rate 18, height 6' 2\" (1.88 m), weight 83 kg (182 lb 15.7 oz), SpO2 96 %. Temp (24hrs), Av.5 °F (36.9 °C), Min:98 °F (36.7 °C), Max:99 °F (37.2 °C) Hemodynamics: 
 CO:   
 CI:   
 CVP:   
 SVR:   
 PAP Systolic:   
 PAP Diastolic:   
 PVR:   
 CJ08:   
 SCV02:   
 
VAD Interrogation: LVAD (Heartmate) Pump Speed (RPM): 5800 Pump Flow (LPM): 5.6 PI (Pulsitility Index): 2 Power: 4.6 MAP: 70  Test: Yes 
Back Up  at Bedside & Labeled: Yes Power Module Test: Yes Driveline Site Care: No 
Driveline Dressing: Clean, Dry, and Intact EKG/Rhythm:   
 
Extubation Date / Time:  
 
CT Output:  
 
Ventilator: 
Ventilator Volumes Vt Spont (ml): 855 ml (20) Ve Observed (l/min): 13.2 l/min (20) Oxygen Therapy: 
Oxygen Therapy O2 Sat (%): 96 % (20) Pulse via Oximetry: 79 beats per minute (20) O2 Device: Room air (20) O2 Flow Rate (L/min): 4 l/min (20) FIO2 (%): 28 % (20) CXR: 
 
Admission Weight: Last Weight Weight: 81.6 kg (180 lb) Weight: 83 kg (182 lb 15.7 oz) Intake / Eveline Mercado / Lee Kramer: 
Current Shift:  0701 -  1900 In: 240 [P.O.:240] Out: - Last 24 hrs.:  
 
Intake/Output Summary (Last 24 hours) at 2020 1417 Last data filed at 2020 0830 Gross per 24 hour Intake 960 ml Output 2950 ml Net -1990 ml No results for input(s): CPK, CKMB, TROIQ in the last 72 hours. Recent Labs  
  20 
0432 20 
0347 20 
1146 * 134* 134* K 4.4 4.3 4.2 CO2 27 25 23 BUN 28* 26* 27* CREA 1.08 1.16 1.40* GLU 96 106* 133* MG 2.1 2.0 1.9 WBC 5.1 6.2 5.9 HGB 9.6* 8.6* 9.6* HCT 31.0* 28.2* 30.4*  133* 142* Recent Labs  
  04/22/20 
0432 04/21/20 
0347 04/20/20 
1151 INR 2.0* 2.0* 1.9* PTP 19.8* 19.8* 18.7* No lab exists for component: PBNP Current Facility-Administered Medications:  
  [START ON 4/23/2020] digoxin (LANOXIN) tablet 0.125 mg, 0.125 mg, Oral, DAILY, Jaspreet Herrera Medico, NP 
24 [facility]  [START ON 4/23/2020] bumetanide (BUMEX) tablet 1 mg, 1 mg, Oral, DAILY, Jaspreet Herrera Medico, NP 
  warfarin (COUMADIN) tablet 4 mg, 4 mg, Oral, ONCE, Sharon, Jaspreet Medico, NP 
  guar gum (BENEFIBER) packet 1 Packet, 4 g, Oral, TID, Jaspreet Herrera Medico, NP 
  albuterol-ipratropium (DUO-NEB) 2.5 MG-0.5 MG/3 ML, 3 mL, Nebulization, Q6H PRN, Levi, Shana B, NP 
  amoxicillin-clavulanate (AUGMENTIN) 875-125 mg per tablet 1 Tab, 1 Tab, Oral, BID, Levi, Shana B, NP, 1 Tab at 04/22/20 8100 
  aspirin delayed-release tablet 81 mg, 81 mg, Oral, DAILY, Levi, Shana B, NP, 81 mg at 04/22/20 3307 
  atorvastatin (LIPITOR) tablet 40 mg, 40 mg, Oral, DAILY, Levi, Shana B, NP, 40 mg at 04/22/20 7031   cholecalciferol (VITAMIN D3) (1000 Units /25 mcg) tablet 2 Tab, 2,000 Units, Oral, DAILY, Levi, Shana B, NP, 2 Tab at 04/22/20 3043   clonazePAM (KlonoPIN) tablet 0.25 mg, 0.25 mg, Oral, BID, Levi, Shana B, NP, 0.25 mg at 04/22/20 2980 
  finasteride (PROSCAR) tablet 5 mg, 5 mg, Oral, DAILY, Levi, Shana B, NP, 5 mg at 04/22/20 0621 
  gentamicin (GARAMYCIN) 0.1 % cream, , Topical, DAILY, Levi, Shana B, NP 
  lactobac ac& pc-s.therm-b.anim (TIAN Q/RISAQUAD), 1 Cap, Oral, DAILY, Levi, Shana B, NP, 1 Cap at 04/22/20 0881   levETIRAcetam (KEPPRA) tablet 250 mg, 250 mg, Oral, Q12H, Levi, Shana B, NP, 250 mg at 04/22/20 0923 
  magnesium oxide (MAG-OX) tablet 800 mg, 800 mg, Oral, BID, Levi, Shana B, NP, 800 mg at 04/22/20 7867   potassium chloride SR (KLOR-CON 10) tablet 10 mEq, 10 mEq, Oral, DAILY, Levi, Shana B, NP, 10 mEq at 04/22/20 0923 
  sacubitriL-valsartan (ENTRESTO) 24-26 mg tablet 1 Tab, 1 Tab, Oral, BID, Levi, Shana B, NP, 1 Tab at 04/22/20 1580   tamsulosin (FLOMAX) capsule 0.8 mg, 0.8 mg, Oral, QHS, Levi, Shana B, NP, 0.8 mg at 04/21/20 2121   therapeutic multivitamin (THERAGRAN) tablet 1 Tab, 1 Tab, Oral, DAILY, Levi, Shana B, NP, 1 Tab at 04/22/20 0922 
  venlafaxine-SR (EFFEXOR-XR) capsule 150 mg, 150 mg, Oral, DAILY WITH BREAKFAST, Levi, Shana B, NP, 150 mg at 04/22/20 5692   sodium chloride (NS) flush 5-40 mL, 5-40 mL, IntraVENous, Q8H, Levi, Shana B, NP, 10 mL at 04/22/20 7009   sodium chloride (NS) flush 5-40 mL, 5-40 mL, IntraVENous, PRN, Levi, Shana B, NP 
  acetaminophen (TYLENOL) tablet 650 mg, 650 mg, Oral, Q4H PRN, Levi, Shana B, NP, 650 mg at 04/20/20 1955   oxyCODONE-acetaminophen (PERCOCET) 5-325 mg per tablet 1 Tab, 1 Tab, Oral, Q4H PRN, Levi, Shana B, NP 
  naloxone (NARCAN) injection 0.4 mg, 0.4 mg, IntraVENous, PRN, Levi, Shana B, NP 
  ondansetron (ZOFRAN) injection 4 mg, 4 mg, IntraVENous, Q4H PRN, Levi, Shana B, NP 
  hydrALAZINE (APRESOLINE) 20 mg/mL injection 10 mg, 10 mg, IntraVENous, Q4H PRN, Levi, Shana B, NP 
  hydrALAZINE (APRESOLINE) 20 mg/mL injection 20 mg, 20 mg, IntraVENous, Q4H PRN, Levi, Shana B, NP 
  meropenem (MERREM) 500 mg in 0.9% sodium chloride (MBP/ADV) 50 mL, 0.5 g, IntraVENous, Q6H, Shana Sims, NP, Last Rate: 100 mL/hr at 04/22/20 0926, 500 mg at 04/22/20 6213   Warfarin NP Dosing, , Other, PRN, Tatyana Wakefield MD 
 
 
A/P  
 
S/P LVAD - good flows Need for anti-coagulation - coumadin Driveline infection - abx's COPD - nebs Risk of morbidity and mortality - high Medical decision making - high complexity Signed By: Cathryn Gunn MD

## 2020-04-22 NOTE — CONSULTS
Requesting Provider: Papi Atnon MD - Reason for Consultation: Sophia Marques retention, urinary frequency\" Pre-existing [facility] Patient:   Yes, Dr. Faisal Davis Patient: Khushboo Rossi MRN: [id number]  SSN: [id number] YOB: 1950  Age: 71 y.o. Sex: male Location: [facility] [place] Code Status: Full Code PCP: Indra Bernabe MD  - [phone] Emergency Contact:  Primary Emergency Contact: Jocelyn Stevenson, Home Phone: [phone] Race/Hinduism/Language: WHITE OR  /  / Speaks ENGLISH Payor: Payor: Christa Cosme / Plan: [place] MEDICARE PART A & B / Product Type: Medicare /   
Prior Admission Data: 1/30/20 [facility] 4 CV SERVICES UNIT Tenzin QUEZADA Hospitalized:  Hospital Day: 3 - Admitted 4/20/2020 11:49 AM  
 
CONSULTANTS 
IP CONSULT TO INFECTIOUS DISEASES 
IP CONSULT TO UROLOGY ADMISSION DIAGNOSES 
  ICD-10-CM ICD-9-CM 1. Acute cystitis with hematuria N30.01 595.0 2. Chronic anticoagulation Z79.01 V58.61 3. LVAD (left ventricular assist device) present ([facility] [address] Z95.811 V43.21  
4. Bacteremia due to Gram-negative bacteria R78.81 790.7  
  041.85  
5. Acute cystitis without hematuria N30.00 595.0 6. Systolic CHF, chronic (HCC) I50.22 428.22  
  428.0 7. Peripheral vascular disease (HCC) I73.9 443.9 Assessment/Plan: · Urinary retention · Urinary frequency 
 
-Bladder scan pt after next void and record. 
-Place 16 fr coude' catheter for residual >400 mL. Trying to avoid catheter placement given past hx of gross hematuria. CC: Urinary Frequency; Fever; and Shortness of Breath HPI: He is a 71 y.o. male w/ a significant cardiac hx, cardiac arrest, V Fib, ischemic cardiomyopathy w/ EF of 88-57%, complicated hospital course I[facility] requiring LVAD, gross hematuria that required CBI, s/p cystoscopy in 11/2019 (no abnormal lesions found), chronic renal insufficiency w/ creatinine of 2.3.  He was seen by Dr. Faisal Davis in October 2019 w/ weak stream and recurrent UTIs. He was bladder scanned for 500 mL, started on tamsulosin. CT scans have been unremarkable. He has decined CIC in the past. Renal ultrasound in March 2020 shows no hydronephrosis and his bladder residual has improved to 240 ml. 
CT on 4/21: The prostate is slightly prominent with some minimal calcification but no fluid collection. The bladder wall shows some irregularity at the base and mild distention. T 98. WBC 5.1. Hgb 9.6. Cr 1.08. BCX PSEUDOMONAS AERUGINOSA+ in 2/4 bottles Pt reports frequent urination w/o dysuria. Urine appears erica colored. External male catheter in place. Problem: urinary retention; Location: bladder; Quality:chronic, Severity: moderate; Timing:on going, Context: as above in hPI; Better/Worse: TBD, Associated s/s:none  
 
 
 
____________________________________________________ Note from Dr. Jaylyn Bell, 3/26/2020 Visit Type Established Telemedicine Type: Video ezAccess Context: He was last seen in 2009 when he had abnormal prostate exam and biopsy was negative at PSA of 0.8. He has a significant cardiac history, history of cardiac arrest V. fib 2011, ischemic cardiomyopathy with EF of 15 to 20%. He is on home IV milrinone. He has chronic renal insufficiency with creatinine of 2.3, partly due to brisk diuresis. I had seen him in October 2019 with weak stream and recurrent UTIs Location: Prostate Duration: Several months Associated symptoms: He denies any postural hypotension. Denies any dizziness. No gross hematuria. Bladder scan was performed initially and it was about 500 mL. Started tamsulosin. He he has tolerated this very well. Reports his symptoms have improved dramatically. CT of the abdomen and pelvis unremarkable except left kidney was somewhat smaller and scarred. He had declined CIC in October and his bladder scan had improved with medication.   Since then he had had a complicated medical course requiring hospitalization at [facility] for LVAD. During that time he also developed gross hematuria due to infection and required CBI and cystoscopy by Dr. Oziel Quiroga in November 2019 when no abnormal lesions were found. He stayed in the hospital for close to 3 months and then transferred to [facility] for rehab. During hospital stay he required CIC and condom catheter. He has been without a catheter for several weeks. Due to recurrent UTIs he has been taking ciprofloxacin twice daily for the last 1 month. He is also currently on Augmentin due to infection around LVAD site. PAST MEDICAL HISTORY: 
 
Allergies: No known allergies. DENIES: Latex, Shellfish, X-Ray Dye, Iodine. Medications: FINASTERIDE 5 MG ORAL TABLET (FINASTERIDE) 1 po daily; Route: ORAL 
COUMADIN 3 MG ORAL TABLET (WARFARIN SODIUM) ONCE DAILY; Route: ORAL 
VENLAFAXINE HCL  MG ORAL CAPSULE EXTENDED RELEASE 24 HOUR (VENLAFAXINE HCL) ONCE DAILY; Route: ORAL 
FLOMAX 0.4 MG ORAL CAPSULE (TAMSULOSIN HCL) 2 caps  DAILY; Route: ORAL 
ENTRESTO 24-26 MG ORAL TABLET (SACUBITRIL-VALSARTAN) TWICE A DAY; Route: ORAL 
POTASSIUM CHLORIDE ER 10 MEQ ORAL TABLET EXTENDED RELEASE (POTASSIUM CHLORIDE) ONCE DAILY; Route: ORAL MAGNESIUM 400 MG ORAL TABLET (MAGNESIUM) Take 2 tabs by mouth 2x a day; Route: ORAL LEVETIRACETAM 250 MG ORAL TABLET (LEVETIRACETAM) ONCE DAILY; Route: ORAL HYDRALAZINE HCL 10 MG ORAL TABLET (HYDRALAZINE HCL) ONCE DAILY; Route: ORAL 
FINASTERIDE TABLET (FINASTERIDE TABS) ONCE DAILY; Route: ORAL 
CLONAZEPAM 0.5 MG ORAL TABLET (CLONAZEPAM) THREE TIMES A DAY; Route: ORAL 
CIPROFLOXACIN  MG ORAL TABLET (CIPROFLOXACIN HCL) TWICE A DAY; Route: ORAL 
ATORVASTATIN CALCIUM 40 MG ORAL TABLET (ATORVASTATIN CALCIUM) ONCE DAILY; Route: ORAL 
ASPIRIN LOW STRENGTH 81 MG ORAL TABLET CHEWABLE (ASPIRIN) ONCE DAILY; Route: ORAL ALBUTEROL SULFATE NEBULIZATION SOLUTION (ALBUTEROL SULFATE NEBU) ;  Route: INHALATION 
 
 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 DENIES: Diabetes, Bowel Problems, Stroke/Seizure, Kidney Problems, HIV, Hepatitis, or Cancer. Surgeries: Open Heart Surgery, Hernia Repair, and Vasectomy. Family History: DENIES: Prostate cancer, Kidney cancer, Kidney disease, Kidney stones. Social History: Retired. . Smoking status: former smoker. Does not drink alcohol. System Review: Admits to: Dry Mouth, Leg Swelling, Shortness of Breath, Difficulty Walking, Impaired Sex Drive, and Easy Bleeding. DENIES: Unexplained Weight Loss, Dry Eyes, Constipation, Involuntary Urine Loss, Lower Extremity Weakness, Dry Skin, Psychiatric Problems, Rash. Gen: alert, awake Head: Normocephalic, atraumatic Chest: Unlabored breathing URINALYSIS IMPRESSION: 
 
Lower urinary tract symptoms/BPH: He is currently on Augmentin bladder scan 343 mL. He does have urinary frequency and weak stream 
-He is doing well on maximal medical therapy including Flomax 0.8 mg and finasteride 5 mg 
-He is very hesitant to do CIC particularly with his hematuria history and prolonged hospitalization. 
-Condom catheter particularly for nighttime. 
-Thankfully renal ultrasound in March 2020 shows no hydronephrosis and his bladder residual has improved to 240 ml Recurrent UTIs: He had a CT stone protocol which was unremarkable except somewhat atrophic left kidney PLAN: Continue Flomax and finasteride Use condom catheter at nighttime He will start Keflex 250 mg daily for prophylaxis after he missed his Augmentin. Follow-up 3 months for video visit. He was very thankful able to do this over video and not in person cc: Geovanna Narvaez MD 
Transcribed by Speech to Text Technology I personally spent 13 minutes providing the above telemedicine service via phone call. [employer] [facility] Telemedicine Est - Detailed (24833) COVID-19 Instructions provided to patient during telemedicine visit: 
 
Wash your hands frequently. - Patient instructed to frequently wash your hands with an alcohol based hand rub or wash and /or with soap and water for 20 seconds. Maintain social distancing. 
- 6 feet distance between you and others. Avoid touching your eyes nose and mouth. Practice good respiratory hygiene 
-Cover your mouth and nose with your elbow or tissue when you cough or sneeze. Dispose of the use tissue immediately. Stay home if you feel unwell. If you have a fever cough and difficulty breathing call your PCP. Electronically signed by Salma Lafleur MD MPH on 2020 at 10:46 AM 
 
________________________________________________________________________ Temp (24hrs), Av.5 °F (36.9 °C), Min:98 °F (36.7 °C), Max:99 °F (37.2 °C) Urinary Status: Voiding, External catheter Creatinine Date/Time Value Ref Range Status 2020 04:32 AM 1.08 0.70 - 1.30 MG/DL Final  
2020 03:47 AM 1.16 0.70 - 1.30 MG/DL Final  
2020 11:46 AM 1.40 (H) 0.70 - 1.30 MG/DL Final  
2020 12:00 AM 1.06 0.76 - 1.27 mg/dL Final  
2020 02:41 PM 1.09 0.70 - 1.30 MG/DL Final  
 
Current Antimicrobial Therapy (168h ago, onward) Ordered     Start Stop  
 20 1315  amoxicillin-clavulanate (AUGMENTIN) 875-125 mg per tablet 1 Tab  1 Tab,   Oral,   2 TIMES DAILY Note to Pharmacy:  OP SIG:Take 1 Tab by mouth two (2) times a day. 20 1800 --  
 20 1323  meropenem (MERREM) 500 mg in 0.9% sodium chloride (MBP/ADV) 50 mL  0.5 g,   IntraVENous,   EVERY 6 HOURS    
 20 1400 --  
  
 
Key Anti-Platelet Anticoagulant Meds aspirin delayed-release 81 mg tablet Take 1 Tab by mouth daily. warfarin (COUMADIN) 3 mg tablet Take 1 Tab by mouth daily. May take additional tab as directed by provider. warfarin (COUMADIN) 1 mg tablet Take 3 Tabs by mouth daily. May take additional tab as directed by provider. Diet: DIET REGULAR 3-4 GM (JOSÉ LUIS) DIET NUTRITIONAL SUPPLEMENTS Lunch, Dinner; Ensure Enlive - % Diet Eaten: 80 % Labs Lab Results Component Value Date/Time Lactic acid 1.2 04/22/2020 04:32 AM  
 Lactic acid 1.5 04/21/2020 01:59 PM  
 Lactic acid 1.8 04/20/2020 12:04 PM  
 WBC 5.1 04/22/2020 04:32 AM  
 HCT 31.0 (L) 04/22/2020 04:32 AM  
 PLATELET 324 92/28/0822 04:32 AM  
 Sodium 134 (L) 04/22/2020 04:32 AM  
 Potassium 4.4 04/22/2020 04:32 AM  
 Chloride 100 04/22/2020 04:32 AM  
 CO2 27 04/22/2020 04:32 AM  
 BUN 28 (H) 04/22/2020 04:32 AM  
 Creatinine 1.08 04/22/2020 04:32 AM  
 Glucose 96 04/22/2020 04:32 AM  
 Calcium 9.1 04/22/2020 04:32 AM  
 Magnesium 2.1 04/22/2020 04:32 AM  
 INR 2.0 (H) 04/22/2020 04:32 AM  
 Prostate Specific Ag 0.3 10/25/2019 02:56 PM  
 
UA:  
Lab Results Component Value Date/Time Color DARK YELLOW 04/20/2020 11:54 AM  
 Appearance CLOUDY (A) 04/20/2020 11:54 AM  
 Specific gravity 1.018 04/20/2020 11:54 AM  
 Specific gravity 1.015 10/31/2019 11:00 AM  
 pH (UA) 5.5 04/20/2020 11:54 AM  
 Protein 100 (A) 04/20/2020 11:54 AM  
 Glucose Negative 04/20/2020 11:54 AM  
 Ketone Negative 04/20/2020 11:54 AM  
 Bilirubin Negative 04/20/2020 11:54 AM  
 Urobilinogen 1.0 04/20/2020 11:54 AM  
 Nitrites Negative 04/20/2020 11:54 AM  
 Leukocyte Esterase SMALL (A) 04/20/2020 11:54 AM  
 Epithelial cells FEW 04/20/2020 11:54 AM  
 Bacteria 1+ (A) 04/20/2020 11:54 AM  
 WBC 10-20 04/20/2020 11:54 AM  
 RBC 10-20 04/20/2020 11:54 AM  
 
Imaging Results for orders placed during the hospital encounter of 04/20/20 CT PELV W CONT Narrative EXAM: CT PELV W CONT INDICATION: evaluate for prostate abscess COMPARISON: None. CONTRAST: 100 mL of Isovue-370. TECHNIQUE:  
Multislice helical CT of the pelvis was performed from the iliac crest to the 
symphysis pubis during uneventful rapid bolus intravenous contrast 
administration. Oral contrast was not administered. Coronal and sagittal 
reformations were generated. CT dose reduction was achieved through use of a 
standardized protocol tailored for this examination and automatic exposure 
control for dose modulation. FINDINGS: 
Prominent sized distal abdominal aorta measuring AP diameter of 2.5 cm. Sigmoid diverticulosis. The prostate is slightly prominent with some minimal calcification but no fluid 
collection. The bladder wall shows some irregularity at the base and mild distention. Impression IMPRESSION: 
Slightly Prominent sized prostate without fluid collection. Irregular bladder floor US Results (most recent): 
Results from [place] encounter on 03/12/20 US ABD LTD Narrative Clinical history: Evaluate for fluid around LVAD. Real-time sonographic imaging of the entry site for the LVAD was performed. There is a small amount of circumferential fluid surrounding the LVAD at the 
entry site. Impression IMPRESSION: Small amount of fluid surrounding the LVAD at the entry site. Cultures All Micro Results Procedure Component Value Units Date/Time CULTURE, BLOOD, PAIRED [[id number]]  (Abnormal) Collected:  04/20/20 1204 Order Status:  Completed Specimen:  Blood Updated:  04/22/20 1201 Special Requests: NO SPECIAL REQUESTS Culture result:    
  PSEUDOMONAS AERUGINOSA GROWING IN 2 OF 4 BOTTLES DRAWN (SITES = LAC AND RAC) SENSITIVITY TO FOLLOW  
     
      
  REMAINING BOTTLE(S) HAS/HAVE NO GROWTH SO FAR  
     
 CULTURE, URINE [[id number]] Collected:  04/20/20 1154 Order Status:  Completed Specimen:  Urine Updated:  04/21/20 1040 Special Requests: --     
  NO SPECIAL REQUESTS Reflexed from [id number] Culture result: No growth (<1,000 CFU/ML) URINE CULTURE HOLD SAMPLE [[id number]] Collected:  04/20/20 1154 Order Status:  Completed Specimen:  Serum Updated:  04/20/20 1211 Urine culture hold Urine on hold in Microbiology dept for 2 days. If unpreserved urine is submitted, it cannot be used for addtional testing after 24 hours, recollection will be required. Past History: (Complete 2+/3 areas) Allergies Allergen Reactions  Cefepime Other (comments)  
  myoclonus Current Facility-Administered Medications Medication Dose Route Frequency  [START ON 4/23/2020] digoxin (LANOXIN) tablet 0.125 mg  0.125 mg Oral DAILY  [START ON 4/23/2020] bumetanide (BUMEX) tablet 1 mg  1 mg Oral DAILY  warfarin (COUMADIN) tablet 4 mg  4 mg Oral ONCE  
 guar gum (BENEFIBER) packet 1 Packet  4 g Oral TID  albuterol-ipratropium (DUO-NEB) 2.5 MG-0.5 MG/3 ML  3 mL Nebulization Q6H PRN  
 amoxicillin-clavulanate (AUGMENTIN) 875-125 mg per tablet 1 Tab  1 Tab Oral BID  aspirin delayed-release tablet 81 mg  81 mg Oral DAILY  atorvastatin (LIPITOR) tablet 40 mg  40 mg Oral DAILY  cholecalciferol (VITAMIN D3) (1000 Units /25 mcg) tablet 2 Tab  2,000 Units Oral DAILY  clonazePAM (KlonoPIN) tablet 0.25 mg  0.25 mg Oral BID  finasteride (PROSCAR) tablet 5 mg  5 mg Oral DAILY  gentamicin (GARAMYCIN) 0.1 % cream   Topical DAILY  lactobac ac& pc-s.therm-b.anim (TIAN Q/RISAQUAD)  1 Cap Oral DAILY  levETIRAcetam (KEPPRA) tablet 250 mg  250 mg Oral Q12H  
 magnesium oxide (MAG-OX) tablet 800 mg  800 mg Oral BID  potassium chloride SR (KLOR-CON 10) tablet 10 mEq  10 mEq Oral DAILY  sacubitriL-valsartan (ENTRESTO) 24-26 mg tablet 1 Tab  1 Tab Oral BID  tamsulosin (FLOMAX) capsule 0.8 mg  0.8 mg Oral QHS  therapeutic multivitamin (THERAGRAN) tablet 1 Tab  1 Tab Oral DAILY  venlafaxine-SR (EFFEXOR-XR) capsule 150 mg  150 mg Oral DAILY WITH BREAKFAST  sodium chloride (NS) flush 5-40 mL  5-40 mL IntraVENous Q8H  
 sodium chloride (NS) flush 5-40 mL  5-40 mL IntraVENous PRN  
 acetaminophen (TYLENOL) tablet 650 mg  650 mg Oral Q4H PRN  
 oxyCODONE-acetaminophen (PERCOCET) 5-325 mg per tablet 1 Tab  1 Tab Oral Q4H PRN  
 naloxone (NARCAN) injection 0.4 mg  0.4 mg IntraVENous PRN  
 ondansetron (ZOFRAN) injection 4 mg  4 mg IntraVENous Q4H PRN  
 hydrALAZINE (APRESOLINE) 20 mg/mL injection 10 mg  10 mg IntraVENous Q4H PRN  
 hydrALAZINE (APRESOLINE) 20 mg/mL injection 20 mg  20 mg IntraVENous Q4H PRN  
 meropenem (MERREM) 500 mg in 0.9% sodium chloride (MBP/ADV) 50 mL  0.5 g IntraVENous Q6H  Warfarin NP Dosing   Other PRN Prior to Admission medications Medication Sig Start Date End Date Taking? Authorizing Provider  
budesonide (PULMICORT) 0.5 mg/2 mL nbsp 500 mcg by Nebulization route every evening. Yes Provider, Historical  
albuterol-ipratropium (DUO-NEB) 2.5 mg-0.5 mg/3 ml nebu 3 mL by Nebulization route nightly. Yes Provider, Historical  
potassium chloride SR (KLOR-CON 10) 10 mEq tablet Take 1 Tab by mouth daily. 4/7/20   Madelin Herrera NP  
clonazePAM (KlonoPIN) 0.5 mg tablet Take 0.5 Tabs by mouth two (2) times a day. Max Daily Amount: 0.5 mg. 4/7/20   Madelin Herrera NP  
sacubitriL-valsartan Valri Sendy) 24-26 mg tablet Take 1 Tab by mouth two (2) times a day. 4/7/20   Madelin Herrera NP  
amoxicillin-clavulanate (AUGMENTIN) 875-125 mg per tablet Take 1 Tab by mouth two (2) times a day. 3/25/20   Primo Sims, NP  
tamsulosin (Flomax) 0.4 mg capsule Take 0.8 mg by mouth nightly. Provider, Historical  
gentamicin (GARAMYCIN) 0.1 % topical ointment Apply to exit site daily. 3/16/20   Madelin Herrera, NP  
aspirin delayed-release 81 mg tablet Take 1 Tab by mouth daily.  3/9/20   Lidia Jeffery, NP  
 venlafaxine-SR (EFFEXOR-XR) 150 mg capsule Take 1 Cap by mouth daily (with breakfast). 3/9/20   Arnulfo Herrera NP  
therapeutic multivitamin [facility]) tablet Take 1 Tab by mouth daily. 3/9/20   Arnulfo Herrera NP  
magnesium oxide (MAG-OX) 400 mg tablet Take 2 Tabs by mouth two (2) times a day. 3/9/20   Arnulfo Herrera NP  
levETIRAcetam (KEPPRA) 250 mg tablet Take 1 Tab by mouth two (2) times a day. 3/9/20   Arnulfo Herrera NP  
L. acidoph & paracasei- S therm- Bifido (TIAN-Q/RISAQUAD) 8 billion cell cap cap Take 1 Cap by mouth daily. 3/9/20   Arnulfo Herrera NP  
finasteride (PROSCAR) 5 mg tablet Take 1 Tab by mouth daily. 3/9/20   Arnulfo Herrera NP  
cholecalciferol (VITAMIN D3) (1000 Units /25 mcg) tablet Take 2 Tabs by mouth daily. 3/9/20   Arnulfo Herrera NP  
atorvastatin (LIPITOR) 40 mg tablet Take 1 Tab by mouth daily. 3/9/20   Arnulfo Herrera NP  
warfarin (COUMADIN) 3 mg tablet Take 1 Tab by mouth daily. May take additional tab as directed by provider. 3/9/20   Arnulfo Herrera NP  
albuterol (PROVENTIL HFA, VENTOLIN HFA, PROAIR HFA) 90 mcg/actuation inhaler Take 2 Puffs by inhalation every six (6) hours as needed for Wheezing. 2/26/20   Lance Sims NP  
warfarin (COUMADIN) 1 mg tablet Take 3 Tabs by mouth daily. May take additional tab as directed by provider. 2/25/20   Milvia Parra NP  
  
 
PMHx:  has a past medical history of Degenerative disc disease, lumbar, Heart failure ([address] High cholesterol, Hypertension, Paroxysmal atrial fibrillation ([address] (4/2/2019), and Spinal stenosis.   
PSurgHx:  has a past surgical history that includes hx coronary artery bypass graft; hx appendectomy; hx hernia repair; hx implantable cardioverter defibrillator; pr cardioversion elective arrhythmia external (N/A, 6/10/2019); pr cardioversion elective arrhythmia external (N/A, 6/18/2019); pr insj/rplcmt perm dfb w/trnsvns lds 1/dual chmbr (N/A, 6/21/2019); pr tcat impl wrls p-art prs snr l-t hemodyn mntr (N/A, 9/18/2019); colonoscopy (Left, 11/11/2019); hx heart assist device (Left, 10/29/2019); hx heart assist device (Left, 11/18/2019); and hx thrombectomy (Left, 11/25/2019). PSocHx:  reports that he quit smoking about 9 years ago. He has never used smokeless tobacco. He reports previous alcohol use. He reports that he does not use drugs. ROS:  (Complete - 10 systems) - DENIES: Weightloss (Constitutional), Dry mouth (ENMT), Chest pain (CV), SOB (Respiratory), Constipation (GI), Weakness (MS), Pallor (Skin), TIA Sx (Neuro), Confusion (Psych), Easy bruising (Heme) Physical Exam: (Comprehesive - 8+ 1995 Systems)  
 
(1) Constitutional:  FIO2: FIO2 (%): 28 % on SpO2: O2 Sat (%): 96 % O2 Device: Room air O2 Flow Rate (L/min): 4 l/min Patient Vitals for the past 24 hrs: 
 Temp Pulse Resp SpO2 Weight 04/22/20 1101 98 °F (36.7 °C) 99 18 96 %   
04/22/20 0441     83 kg (182 lb 15.7 oz) 04/22/20 0416 98.7 °F (37.1 °C) (!) 110 18 94 %   
04/21/20 2313 99 °F (37.2 °C) (!) 105 18 91 %   
04/21/20 2013 98.6 °F (37 °C) 94 18 92 %   
04/21/20 1502 98 °F (36.7 °C) 83 18 97 %  Date 04/21/20 0700 - 04/22/20 5672 04/22/20 0700 - 04/23/20 9082 Shift [id number] 1900-0659 24 Hour Total 2865-0993 2324-8411 24 Hour Total  
INTAKE  
P.O.  240  240  
  P. O.  240  240 Shift Total(mL/kg) 540(6.5) 720(8.7) 8149(51.8) 240(2.9)  240(2.9) OUTPUT Urine(mL/kg/hr) 800(0.8) 2550(2.6) 3350(1.7) Urine Occurrence(s) 1 x  1 x Urine Output (mL) (Condom Catheter 04/20/20) 800 2550 3350 Stool Stool Occurrence(s) 1 x 1 x 2 x 1 x  1 x Shift Total(mL/kg) 800(9.6) 2550(30.7) 3350(40.4) NET -260 -6977 -6686 240  240 Weight (kg) 83.6 83 83 83 83 83  
  
(2) ENMT:   moist mucous membranes, normal sinuses (3) Respiratory:  breathing easily, no distress (4) GI:  no abdominal masses, tenderness (5) :   Urinary frequency, condom cath in place (6) Lymphatic:  no adenopathy, neck supple  
(7) Muscloskeletal:  no gross deformity, normal ROM  
(8) Skin:  no rash, warm & dry  
(9) Neuro:  no focal deficits, normal speech Signed By: Gina Machuca NP  - April 22, 2020

## 2020-04-23 NOTE — PROGRESS NOTES
0730: Bedside shift change report given to Latasha RN (oncoming nurse) by Parag Plata RN (offgoing nurse). Report included the following information SBAR, Kardex, Procedure Summary, Intake/Output, MAR, and Recent Results. 1215: S/w Arlyn Hester NP regarding elevated PI and low MAP of 60. Orders for 250 ml NS bolus. 1300: Driveline dressing changed. Gentamycin ointment applied. Scant yellow drainage noted at driveline site. 1530: Bedside shift change report given to [address] [place] [address] Rd (oncoming nurse) by Penelope Brown RN (offgoing nurse). Report included the following information SBAR, Kardex, Procedure Summary, Intake/Output, MAR and Recent Results. Problem: Falls - Risk of 
Goal: *Absence of Falls Description: Document Nathaly Minium Fall Risk and appropriate interventions in the flowsheet. Outcome: Progressing Towards Goal 
Note: Fall Risk Interventions: 
Mobility Interventions: Communicate number of staff needed for ambulation/transfer, PT Consult for mobility concerns, Strengthening exercises (ROM-active/passive) Medication Interventions: Assess postural VS orthostatic hypotension, Patient to call before getting OOB, Teach patient to arise slowly Elimination Interventions: Call light in reach, Toilet paper/wipes in reach, Toileting schedule/hourly rounds History of Falls Interventions: Evaluate medications/consider consulting pharmacy, Door open when patient unattended Problem: Patient Education: Go to Patient Education Activity Goal: Patient/Family Education Outcome: Progressing Towards Goal 
  
Problem: Pressure Injury - Risk of 
Goal: *Prevention of pressure injury Description: Document Mich Scale and appropriate interventions in the flowsheet. Outcome: Progressing Towards Goal 
Note: Pressure Injury Interventions: 
Sensory Interventions: Assess changes in LOC, Keep linens dry and wrinkle-free Moisture Interventions: Internal/External urinary devices Activity Interventions: Increase time out of bed, PT/OT evaluation, Pressure redistribution bed/mattress(bed type) Mobility Interventions: HOB 30 degrees or less, Pressure redistribution bed/mattress (bed type), PT/OT evaluation Nutrition Interventions: Document food/fluid/supplement intake, Discuss nutritional consult with provider, Offer support with meals,snacks and hydration Friction and Shear Interventions: Lift team/patient mobility team, HOB 30 degrees or less Problem: Patient Education: Go to Patient Education Activity Goal: Patient/Family Education Outcome: Progressing Towards Goal 
  
Problem: Urinary Tract Infection - Adult Goal: *Absence of infection signs and symptoms Outcome: Progressing Towards Goal

## 2020-04-23 NOTE — PROGRESS NOTES
Notified by Brenda Lynch of [facility] if the patient needs IV antibiotics for discharge he will have a 20% copay through his  for life. Will await continued cultures/orders from Dr. Bertin Marr. If IV antibiotics are necessary the patient's wife will also need training. Karolyn Nickerson, MSW, LCSW Clinical  [id number]

## 2020-04-23 NOTE — PROGRESS NOTES
[address] [facility] in [place], [place] Progress Note Patient name: Quyen Miller Patient : 1950 Patient MRN: [id number] Date of service: 20 CHIEF COMPLAINT: 
Chief Complaint Patient presents with  Urinary Frequency  Fever  Shortness of Breath HPI: Zurdo Kiser is a 71y.o. year old white male with a history of HTN, HLD, JOSE, CAD s/p cardiac arrest VFib s/p CABG () c/b sternal would infection and sternectomy, ischemic cardiomyopathy LVEF 15-20%, s/p BiVICD  who underwent LVAD as DT on 2019 after bridging with Impella 5.0.  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 He was transferred to [facility] rehab on  and discharged from rehab on 2020. Herman Johnston was seen  for an LVAD follow up visit. Over the weekend, he developed a fever as high as 100.9 and chills. This was associated with increased urinary frequency and dysuria with foul smelling and discolored urine. Herman Johnston had been treated with Cipro since  for pseudomonas urosepsis and resultant bacteremia. Per ID, he was to remain on Cipro for at least 6-12 months, possibly lifelong. According to the patient and his wife, his Cipro was stopped by Urology during a recent outpatient visit. He was referred to the ED, evaluated and admitted to [facility] [place]. He remains on CVSU undergoing treatment for UTI and bacteremia. Recent Events:  
Feels better each day Tmax 99.6 Excellent diuresis Remains tachycardic INR 1.8 Assessment/Plan: NYHA Class II Sepsis, suspect related to recurrent pseudomonal infection [facility]  -  bottles growing pseudomonas; sensitivities pending UA suspicious for UTI; but urine culture shows < 1,000 CFU Repeat urine Cx  
 LA normal, PCT trending down Continue Merrem ID consult appreciated CT pelvis negative for prostate abscess ESR 70 from 73, trend daily; May need JACQUE later this week ICM - Stage D,  LVEF 15%, NYHA Class IV improved to NYHA Class III s/p HM3 LVAD as DT on 11/18/19 with Impella 5.0 as bridge to LVAD Continue LVAD speed at 5800 rpm  
 VAD interrogated; frequent PI events, no adverse alarms TTE 4/20 shows large LVIDd, 6.84 cm, RVIDd 3.06 cm, TAPSE 1.15 cm, severely elevated CVP Discontinue Bumex  mL bolus x 1 Continue digoxin 0.125 mg PO daily to support RV through infection - check daily levels Intolerant to BB d/t RV failure Continue Entresto  24/26 mg PO BID - watch cautiously Intolerant to spironolactone due to IVVD and hyponatremia Dr. Valentino Faster would like to pull sternal wire once stable - will need to wait until active infection has resolved HTN, goal MAP 70-90 mmHg Continue Stacie Quiet MAPs on LUE only d/t previous right axillary Impella cannulation Chronic anticoagulation, goal INR 1.5-2.0 Continue warfarin and ASA MSSA drive line infection Continue Augmentin 875/125 mg PO BID indefinitely Gentamicin to exit site with daily dressing changes CT x 3 reviewed by Dr. Valentino Faster - small fluid collection adjacent to previous real drain tract has resolved Continue daily dressing changes VT - s/p [facility] BiVICD No recent shocks Follow up with EP as directed COPD Albuterol PRN 
 
JOSE Encouraged patient to be compliant with CPAP therapy CPAP qHS while IP Follow up with Dr. Ellis Garcia Encouraged increased protein intake Tremors Resolved Continue keppra 250 mg BID Continue clonazepam 0.5 mg BID Significant relief from klonopin - plan to wean keppra once symptoms improved with klonopin - trial as IP  
 
BPH On finasteride and tamsulosin Urology consult appreciated 
 ?urologic malignancy Depression On Effexor - mg daily Persistently elevated CEA Persistently elevated CEA; PET scan shows increased activity RML of lung. Oncology consult appreciated - PET not suggestive of malignancy Orthostatic Hypotension Improved Encourage use of [employer] PAF Digoxin 0.125 mg PO daily Intolerant of BB d/t RV failure On warfarin Debility Improving Continue PT/OT while inpatient Anemia FOB negative Diarrhea, improved Likely related to recent stool softener use No stool softeners - patient is intolerant Continue Benefiber CARDIAC IMAGING: 
Chest CTA- no outflow graft occlusion Pet Scan equivocal for amyloid 10/25/19 ECHO ADULT COMPLETE 01/29/2020 1/29/2020 Narrative · Severely dilated left ventricle. Upper normal wall thickness. Severe  
systolic dysfunction. Estimated left ventricular ejection fraction is  
<15%. Left ventricular diastolic dysfunction. · Mitral valve thickening. Minimal mitral valve prolapse of the anterior  
mitral valve leaflet. Trace mitral valve regurgitation is present. · Mild tricuspid valve regurgitation is present. · Mildly dilated right ventricle. Moderately reduced RV systolic function (TAPSE 1.3 cm, RV S' 6 cm/s). Pacer/ICD present. · Mildly biatrial enlargement · Severely elevated central venous pressure (15+ mmHg); IVC diameter is  
larger than 21 mm and collapses less than 50% with respiration. · LVAD inflow cannula spectral Doppler tracings not obtained. Signed by: Janet Arguelles MD  
 
  
  
OTHER IMAGING: 
Head CT negative for acute process EEG suggestive of mild generalized encephalopathic process, possibly related to underlying structural brain injury vs metabolic abnormality Review of Systems Constitutional: Negative for chills, fever and weight loss. HENT: Positive for hearing loss. Eyes: Negative. Respiratory: Negative. Cardiovascular: Negative. Gastrointestinal: Negative. Genitourinary: Negative for dysuria, frequency, hematuria and urgency. Musculoskeletal: Negative. Skin: Negative. Neurological: Positive for weakness. Endo/Heme/Allergies: Bruises/bleeds easily. Psychiatric/Behavioral: Negative. Visit Vitals /84 Pulse 92 Temp 98.1 °F (36.7 °C) Resp 18 Ht 6' 2\" (1.88 m) Wt 183 lb 6.8 oz (83.2 kg) SpO2 98% BMI 23.55 kg/m² LVAD Pump Speed (RPM): 5400 Pump Flow (LPM): 4.5 MAP: 84 
PI (Pulsitility Index): 9.1 Power: 4.3  Test: No 
Back Up  at Bedside & Labeled: Yes Power Module Test: No 
Driveline Site Care: No 
Driveline Dressing: Clean, Dry, and Intact Outpatient: No 
MAP in Therapeutic Range (Outpatient): Yes Testing Alarms Reviewed: Yes 
Back up SC speed: 5800 Back up Low Speed Limit: 5400 Emergency Equipment with Patient?: Yes Emergency procedures reviewed?: Yes Drive line site inspected?: No 
Drive line intergrity inspected?: Yes Drive line dressing changed?: No 
 
Results for orders placed or performed during the hospital encounter of 04/20/20 CULTURE, BLOOD, PAIRED Result Value Ref Range Special Requests: NO SPECIAL REQUESTS Culture result: (A) PSEUDOMONAS AERUGINOSA GROWING IN 2 OF 4 BOTTLES DRAWN (SITES = LAC AND RAC) Culture result: REMAINING BOTTLE(S) HAS/HAVE NO GROWTH SO FAR Susceptibility Pseudomonas aeruginosa - ANA Amikacin ($) <=2 Susceptible ug/mL Ceftazidime ($) 4 Susceptible ug/mL Cefepime ($$) 8 Susceptible ug/mL Ciprofloxacin ($)* 1 Susceptible ug/mL * **FDA INTERPRETATION REFLECTED, REFER TO CLSI FOR ALTERNATE INTERPRETATIONS.**  
  Gentamicin ($) <=1 Susceptible ug/mL Levofloxacin ($)* 4 Intermediate ug/mL * **FDA INTERPRETATION REFLECTED, REFER TO CLSI FOR ALTERNATE INTERPRETATIONS.**  
  Meropenem ($$) 1 Susceptible ug/mL Piperacillin/Tazobac ($)* 32 Susceptible ug/mL * **FDA INTERPRETATION REFLECTED, REFER TO CLSI FOR ALTERNATE INTERPRETATIONS.**  
  Tobramycin ($) <=1 Susceptible ug/mL URINE CULTURE HOLD SAMPLE Result Value Ref Range Urine culture hold Urine on hold in Microbiology dept for 2 days. If unpreserved urine is submitted, it cannot be used for addtional testing after 24 hours, recollection will be required. CULTURE, URINE Result Value Ref Range Special Requests: NO SPECIAL REQUESTS Reflexed from [id number] Culture result: No growth (<1,000 CFU/ML) XR CHEST PORT Narrative INDICATION:  SOB, fatigue, fever EXAM: Single portable view of chest 1201 . Comparison:1/30/2020 Findings: Cardiac silhouette is enlarged. AICD and left ventricular assist 
device unchanged. Pulmonary vasculature is not engorged. Interstitial prominence 
has improved in the interval. Given overlying hardware no new consolidation. No 
pneumothorax or effusion Impression Impression: 1. Enlarged cardiac silhouette, diffuse interstitial prominence improved in the 
interval with no new consolidation CT ABD WO CONT Narrative EXAM: CT ABD WO CONT INDICATION: driveline infection COMPARISON: CT 3/23/2020. Chest x-ray 4/20/2020. TECHNIQUE: Unenhanced thin axial images were obtained through the abdomen. Coronal and sagittal reconstructions were generated. Oral contrast was not 
administered. CT dose reduction was achieved through use of a standardized 
protocol tailored for this examination and automatic exposure control for dose 
modulation. FINDINGS:  
 
LUNG BASES: Clear. INCIDENTALLY IMAGED HEART AND MEDIASTINUM: Enlarged heart with chamber dilation, 
coronary artery calcifications, and AICD device redemonstrated. LIVER: Unremarkable. GALLBLADDER: Unremarkable. SPLEEN: Unremarkable. PANCREAS: No mass or duct dilation. ADRENALS: Unremarkable. KIDNEYS AND VISUALIZED URETERS: No mass, calculus, or hydronephrosis. STOMACH: Unremarkable. VISUALIZED BOWEL: No dilation or wall thickening. PERITONEUM: No ascites or pneumoperitoneum. RETROPERITONEUM: Atherosclerotic calcification with aortic ectasia to 2.6 cm 
infrarenally. No enlarged lymphadenopathy. BONES: Degenerative spine change. No acute fracture or aggressive lesion. ADDITIONAL COMMENTS: Bilateral gynecomastia noted. Impression IMPRESSION: Dilated cardiomegaly with ICD device and additional incidental 
findings as above. No acute intra-abdominal process. CT PELV W CONT Narrative EXAM: CT PELV W CONT INDICATION: evaluate for prostate abscess COMPARISON: None. CONTRAST: 100 mL of Isovue-370. TECHNIQUE:  
Multislice helical CT of the pelvis was performed from the iliac crest to the 
symphysis pubis during uneventful rapid bolus intravenous contrast 
administration. Oral contrast was not administered. Coronal and sagittal 
reformations were generated. CT dose reduction was achieved through use of a 
standardized protocol tailored for this examination and automatic exposure 
control for dose modulation. FINDINGS: 
Prominent sized distal abdominal aorta measuring AP diameter of 2.5 cm. Sigmoid diverticulosis. The prostate is slightly prominent with some minimal calcification but no fluid 
collection. The bladder wall shows some irregularity at the base and mild distention. Impression IMPRESSION: 
Slightly Prominent sized prostate without fluid collection. Irregular bladder floor CBC WITH AUTOMATED DIFF Result Value Ref Range WBC 5.9 4.1 - 11.1 K/uL  
 RBC 3.19 (L) 4.10 - 5.70 M/uL HGB 9.6 (L) 12.1 - 17.0 g/dL HCT 30.4 (L) 36.6 - 50.3 % MCV 95.3 80.0 - 99.0 FL  
 MCH 30.1 26.0 - 34.0 PG  
 MCHC 31.6 30.0 - 36.5 g/dL  
 RDW 16.5 (H) 11.5 - 14.5 % PLATELET 259 (L) 028 - 400 K/uL MPV 9.6 8.9 - 12.9 FL  
 NRBC 0.0 0  WBC ABSOLUTE NRBC 0.00 0.00 - 0.01 K/uL NEUTROPHILS 89 (H) 32 - 75 % LYMPHOCYTES 5 (L) 12 - 49 % MONOCYTES 5 5 - 13 % EOSINOPHILS 1 0 - 7 % BASOPHILS 0 0 - 1 % IMMATURE GRANULOCYTES 0 0.0 - 0.5 % ABS. NEUTROPHILS 5.2 1.8 - 8.0 K/UL  
 ABS. LYMPHOCYTES 0.3 (L) 0.8 - 3.5 K/UL  
 ABS. MONOCYTES 0.3 0.0 - 1.0 K/UL  
 ABS. EOSINOPHILS 0.1 0.0 - 0.4 K/UL  
 ABS. BASOPHILS 0.0 0.0 - 0.1 K/UL  
 ABS. IMM. GRANS. 0.0 0.00 - 0.04 K/UL  
 DF SMEAR SCANNED    
 RBC COMMENTS ANISOCYTOSIS 1+ 
    
 RBC COMMENTS OVALOCYTES 2+ 
    
 RBC COMMENTS SABAS CELLS 1+ WBC COMMENTS RBC FRAGMENTS    
METABOLIC PANEL, COMPREHENSIVE Result Value Ref Range Sodium 134 (L) 136 - 145 mmol/L Potassium 4.2 3.5 - 5.1 mmol/L Chloride 101 97 - 108 mmol/L  
 CO2 23 21 - 32 mmol/L Anion gap 10 5 - 15 mmol/L Glucose 133 (H) 65 - 100 mg/dL BUN 27 (H) 6 - 20 MG/DL Creatinine 1.40 (H) 0.70 - 1.30 MG/DL  
 BUN/Creatinine ratio 19 12 - 20 GFR est AA >60 >60 ml/min/1.73m2 GFR est non-AA 50 (L) >60 ml/min/1.73m2 Calcium 8.9 8.5 - 10.1 MG/DL Bilirubin, total 0.7 0.2 - 1.0 MG/DL  
 ALT (SGPT) 15 12 - 78 U/L  
 AST (SGOT) 16 15 - 37 U/L Alk. phosphatase 168 (H) 45 - 117 U/L Protein, total 7.3 6.4 - 8.2 g/dL Albumin 2.6 (L) 3.5 - 5.0 g/dL Globulin 4.7 (H) 2.0 - 4.0 g/dL A-G Ratio 0.6 (L) 1.1 - 2.2 SAMPLES BEING HELD Result Value Ref Range SAMPLES BEING HELD 1RED,1BLU   
 COMMENT Add-on orders for these samples will be processed based on acceptable specimen integrity and analyte stability, which may vary by analyte. URINALYSIS W/ REFLEX CULTURE Result Value Ref Range Color DARK YELLOW Appearance CLOUDY (A) CLEAR Specific gravity 1.018 1.003 - 1.030    
 pH (UA) 5.5 5.0 - 8.0 Protein 100 (A) NEG mg/dL Glucose Negative NEG mg/dL Ketone Negative NEG mg/dL Bilirubin Negative NEG Blood LARGE (A) NEG Urobilinogen 1.0 0.2 - 1.0 EU/dL Nitrites Negative NEG  Leukocyte Esterase SMALL (A) NEG    
 WBC 10-20 0 - 4 /hpf  
 RBC 10-20 0 - 5 /hpf Epithelial cells FEW FEW /lpf Bacteria 1+ (A) NEG /hpf  
 UA:UC IF INDICATED URINE CULTURE ORDERED (A) CNI Budding yeast PRESENT (A) NEG    
LACTIC ACID Result Value Ref Range Lactic acid 1.8 0.4 - 2.0 MMOL/L  
PROTHROMBIN TIME + INR Result Value Ref Range INR 1.9 (H) 0.9 - 1.1 Prothrombin time 18.7 (H) 9.0 - 11.1 sec PROCALCITONIN Result Value Ref Range Procalcitonin 0.31 ng/mL MAGNESIUM Result Value Ref Range Magnesium 1.9 1.6 - 2.4 mg/dL LD Result Value Ref Range  (H) 85 - 836 U/L  
METABOLIC PANEL, COMPREHENSIVE Result Value Ref Range Sodium 134 (L) 136 - 145 mmol/L Potassium 4.3 3.5 - 5.1 mmol/L Chloride 106 97 - 108 mmol/L  
 CO2 25 21 - 32 mmol/L Anion gap 3 (L) 5 - 15 mmol/L Glucose 106 (H) 65 - 100 mg/dL BUN 26 (H) 6 - 20 MG/DL Creatinine 1.16 0.70 - 1.30 MG/DL  
 BUN/Creatinine ratio 22 (H) 12 - 20 GFR est AA >60 >60 ml/min/1.73m2 GFR est non-AA >60 >60 ml/min/1.73m2 Calcium 8.7 8.5 - 10.1 MG/DL Bilirubin, total 0.7 0.2 - 1.0 MG/DL  
 ALT (SGPT) 17 12 - 78 U/L  
 AST (SGOT) 18 15 - 37 U/L Alk. phosphatase 164 (H) 45 - 117 U/L Protein, total 6.7 6.4 - 8.2 g/dL Albumin 2.3 (L) 3.5 - 5.0 g/dL Globulin 4.4 (H) 2.0 - 4.0 g/dL A-G Ratio 0.5 (L) 1.1 - 2.2 MAGNESIUM Result Value Ref Range Magnesium 2.0 1.6 - 2.4 mg/dL LD Result Value Ref Range  85 - 241 U/L  
CBC W/O DIFF Result Value Ref Range WBC 6.2 4.1 - 11.1 K/uL  
 RBC 2.95 (L) 4.10 - 5.70 M/uL HGB 8.6 (L) 12.1 - 17.0 g/dL HCT 28.2 (L) 36.6 - 50.3 % MCV 95.6 80.0 - 99.0 FL  
 MCH 29.2 26.0 - 34.0 PG  
 MCHC 30.5 30.0 - 36.5 g/dL  
 RDW 16.6 (H) 11.5 - 14.5 % PLATELET 577 (L) 412 - 400 K/uL MPV 9.8 8.9 - 12.9 FL  
 NRBC 0.0 0  WBC ABSOLUTE NRBC 0.00 0.00 - 0.01 K/uL PROTHROMBIN TIME + INR Result Value Ref Range INR 2.0 (H) 0.9 - 1.1 Prothrombin time 19.8 (H) 9.0 - 11.1 sec PROCALCITONIN Result Value Ref Range Procalcitonin 0.61 ng/mL NT-PRO BNP Result Value Ref Range NT pro-BNP 9,988 (H) <125 PG/ML  
OCCULT BLOOD, STOOL Result Value Ref Range Occult blood, stool Negative NEG    
IRON PROFILE Result Value Ref Range Iron 17 (L) 35 - 150 ug/dL TIBC 144 (L) 250 - 450 ug/dL Iron % saturation 12 (L) 20 - 50 % FERRITIN Result Value Ref Range Ferritin 344 26 - 388 NG/ML  
TSH 3RD GENERATION Result Value Ref Range TSH 1.70 0.36 - 3.74 uIU/mL T4, FREE Result Value Ref Range T4, Free 1.4 0.8 - 1.5 NG/DL  
SED RATE (ESR) Result Value Ref Range Sed rate, automated 87 (H) 0 - 20 mm/hr LACTIC ACID Result Value Ref Range Lactic acid 1.5 0.4 - 2.0 MMOL/L  
METABOLIC PANEL, COMPREHENSIVE Result Value Ref Range Sodium 134 (L) 136 - 145 mmol/L Potassium 4.4 3.5 - 5.1 mmol/L Chloride 100 97 - 108 mmol/L  
 CO2 27 21 - 32 mmol/L Anion gap 7 5 - 15 mmol/L Glucose 96 65 - 100 mg/dL BUN 28 (H) 6 - 20 MG/DL Creatinine 1.08 0.70 - 1.30 MG/DL  
 BUN/Creatinine ratio 26 (H) 12 - 20 GFR est AA >60 >60 ml/min/1.73m2 GFR est non-AA >60 >60 ml/min/1.73m2 Calcium 9.1 8.5 - 10.1 MG/DL Bilirubin, total 0.5 0.2 - 1.0 MG/DL  
 ALT (SGPT) 18 12 - 78 U/L  
 AST (SGOT) 20 15 - 37 U/L Alk. phosphatase 164 (H) 45 - 117 U/L Protein, total 6.9 6.4 - 8.2 g/dL Albumin 2.4 (L) 3.5 - 5.0 g/dL Globulin 4.5 (H) 2.0 - 4.0 g/dL A-G Ratio 0.5 (L) 1.1 - 2.2 MAGNESIUM Result Value Ref Range Magnesium 2.1 1.6 - 2.4 mg/dL LD Result Value Ref Range  85 - 241 U/L  
CBC W/O DIFF Result Value Ref Range WBC 5.1 4.1 - 11.1 K/uL  
 RBC 3.26 (L) 4.10 - 5.70 M/uL HGB 9.6 (L) 12.1 - 17.0 g/dL HCT 31.0 (L) 36.6 - 50.3 % MCV 95.1 80.0 - 99.0 FL  
 MCH 29.4 26.0 - 34.0 PG  
 MCHC 31.0 30.0 - 36.5 g/dL  
 RDW 16.5 (H) 11.5 - 14.5 % PLATELET 413 827 - 068 K/uL MPV 9.7 8.9 - 12.9 FL  
 NRBC 0.0 0  WBC ABSOLUTE NRBC 0.00 0.00 - 0.01 K/uL PROTHROMBIN TIME + INR Result Value Ref Range INR 2.0 (H) 0.9 - 1.1 Prothrombin time 19.8 (H) 9.0 - 11.1 sec PROCALCITONIN Result Value Ref Range Procalcitonin 0.67 ng/mL NT-PRO BNP Result Value Ref Range NT pro-BNP 3,559 (H) <125 PG/ML  
DIGOXIN Result Value Ref Range Digoxin level 0.2 (L) 0.90 - 2.00 NG/ML  
LACTIC ACID Result Value Ref Range Lactic acid 1.2 0.4 - 2.0 MMOL/L  
SED RATE (ESR) Result Value Ref Range Sed rate, automated 73 (H) 0 - 20 mm/hr METABOLIC PANEL, COMPREHENSIVE Result Value Ref Range Sodium 135 (L) 136 - 145 mmol/L Potassium 4.7 3.5 - 5.1 mmol/L Chloride 103 97 - 108 mmol/L  
 CO2 27 21 - 32 mmol/L Anion gap 5 5 - 15 mmol/L Glucose 103 (H) 65 - 100 mg/dL BUN 26 (H) 6 - 20 MG/DL Creatinine 0.99 0.70 - 1.30 MG/DL  
 BUN/Creatinine ratio 26 (H) 12 - 20 GFR est AA >60 >60 ml/min/1.73m2 GFR est non-AA >60 >60 ml/min/1.73m2 Calcium 8.7 8.5 - 10.1 MG/DL Bilirubin, total 0.4 0.2 - 1.0 MG/DL  
 ALT (SGPT) 19 12 - 78 U/L  
 AST (SGOT) 17 15 - 37 U/L Alk. phosphatase 167 (H) 45 - 117 U/L Protein, total 6.4 6.4 - 8.2 g/dL Albumin 2.2 (L) 3.5 - 5.0 g/dL Globulin 4.2 (H) 2.0 - 4.0 g/dL A-G Ratio 0.5 (L) 1.1 - 2.2 MAGNESIUM Result Value Ref Range Magnesium 2.2 1.6 - 2.4 mg/dL LD Result Value Ref Range  85 - 241 U/L  
CBC W/O DIFF Result Value Ref Range WBC 4.6 4.1 - 11.1 K/uL  
 RBC 3.19 (L) 4.10 - 5.70 M/uL HGB 9.3 (L) 12.1 - 17.0 g/dL HCT 29.8 (L) 36.6 - 50.3 % MCV 93.4 80.0 - 99.0 FL  
 MCH 29.2 26.0 - 34.0 PG  
 MCHC 31.2 30.0 - 36.5 g/dL  
 RDW 16.4 (H) 11.5 - 14.5 % PLATELET 032 698 - 174 K/uL MPV 9.8 8.9 - 12.9 FL  
 NRBC 0.0 0  WBC ABSOLUTE NRBC 0.00 0.00 - 0.01 K/uL PROTHROMBIN TIME + INR Result Value Ref Range INR 1.8 (H) 0.9 - 1.1 Prothrombin time 17.8 (H) 9.0 - 11.1 sec PROCALCITONIN Result Value Ref Range Procalcitonin 0.38 ng/mL NT-PRO BNP Result Value Ref Range NT pro-BNP 1,683 (H) <125 PG/ML  
DIGOXIN Result Value Ref Range Digoxin level 0.3 (L) 0.90 - 2.00 NG/ML  
LACTIC ACID Result Value Ref Range Lactic acid 0.7 0.4 - 2.0 MMOL/L  
SED RATE (ESR) Result Value Ref Range Sed rate, automated 70 (H) 0 - 20 mm/hr IP CONSULT TO INFECTIOUS DISEASES Narrative Radhames Russell MD     4/21/2020  1:38 PM 
Infectious Disease Consult Today's Date: 4/21/2020 Admit Date: 4/20/2020 Impression: · Chronic LVAD drive line infection · Pseudomonas UTI with bacteremia--planned on extended course of  
antibiotic therapy, but this was stopped in the outpatient  
setting by urology · Now with GNR in blood culture Plan: · IV antibiotic therapy · Follow up blood culture results and adjust medications as  
needed Anti-infectives: · Merrem · Amoxicillin Subjective:  
Date of Consultation:  April 21, 2020 Referring Physician: Dr Sinclair [facility] Patient is a 71 y.o. male admitted with fevers and foul smelling  
urine. He is known to me from care given over the winter for LVAD  
drive line infection and Pseudomonas UTI. He became ill a couple  
of days ago with fevers and change in his urine. He is admitted  
for this and blood cultures are growing GNR. He is on IV  
antibiotic therapy and we are asked to see him in consultation. Patient Active Problem List  
Diagnosis Code  Paroxysmal atrial fibrillation ([facility]) I48.0  Acute on chronic systolic CHF (congestive heart failure) ([facility] I50.23  Systolic CHF, chronic ([facility]) I50.22  
 Peripheral vascular disease ([facility]) I73.9  Acute decompensated heart failure ([facility]) I50.9  Tremor R25.1  Chronic anticoagulation Z79.01  
 LVAD (left ventricular assist device) present ([facility] Z95.811  Sepsis ([facility] A41.9 Past Medical History: Diagnosis Date  Degenerative disc disease, lumbar  Heart failure ([facility]  High cholesterol  Hypertension  Paroxysmal atrial fibrillation ([facility] 2019  Spinal stenosis Family History Problem Relation Age of Onset  Lung Disease Mother  Hypertension Mother [facility] Arthritis-osteo Mother  Heart Disease Mother  Heart Disease Father  Diabetes Father Social History Tobacco Use  Smoking status: Former Smoker Last attempt to quit: 2010 Years since quittin.3  Smokeless tobacco: Never Used Substance Use Topics  Alcohol use: Not Currently Comment: rarely Past Surgical History:  
Procedure Laterality Date  COLONOSCOPY Left 2019 COLONOSCOPY at bedside performed by Angel Alexander MD at  
[address]  HX CORONARY ARTERY BYPASS GRAFT    
 triple  HX HEART ASSIST DEVICE Left 10/29/2019 Impella 5.0  
 HX HEART ASSIST DEVICE Left 2019 [facility]ate 3  
 HX HERNIA REPAIR    
 HX IMPLANTABLE CARDIOVERTER DEFIBRILLATOR  HX THROMBECTOMY Left 2019 Left brachial thrombectomy  AK CARDIOVERSION ELECTIVE ARRHYTHMIA EXTERNAL N/A 6/10/2019 EP CARDIOVERSION performed [place] [address], [facility]  
CATH LAB  AK CARDIOVERSION ELECTIVE ARRHYTHMIA EXTERNAL N/A 2019 EP CARDIOVERSION performed by Kary Young MD at [address] CATH LAB  AK INSJ/RPLCMT PERM DFB W/TRNSVNS LDS 1/DUAL CHMBR N/A  
2019 INSERT ICD BIV MULTI performed by Zahra Molina MD at [address] CATH LAB  AK TCAT IMPL WRLS P-ART PRS SNR L-T HEMODYN MNTR N/A 2019 IMPLANT HEART FAILURE MONITORING DEVICE performed by Rommel Hernandez MD at [facility] [place], operated by [facility] Prior to Admission medications Medication Sig Start Date End Date Taking? Authorizing Provider  
budesonide (PULMICORT) 0.5 mg/2 mL nbsp 500 mcg by Nebulization route every evening. Yes Provider, Historical  
albuterol-ipratropium (DUO-NEB) 2.5 mg-0.5 mg/3 ml nebu 3 mL by  
Nebulization route nightly. Yes Provider, Historical  
potassium chloride SR (KLOR-CON 10) 10 mEq tablet Take 1 Tab by  
mouth daily. 20   Jaspreet Herrera NP  
clonazePAM (KlonoPIN) 0.5 mg tablet Take 0.5 Tabs by mouth two  
(2) times a day. Max Daily Amount: 0.5 mg. 20   Jaspreet Herrera NP  
sacubitriL-valsartan Marzetta Hum) 24-26 mg tablet Take 1 Tab by  
mouth two (2) times a day. 20   Jaspreet Herrear NP  
amoxicillin-clavulanate (AUGMENTIN) 875-125 mg per tablet Take 1 Tab by mouth two (2) times a day. 3/25/20   Verenice Sims,  
NP  
tamsulosin (Flomax) 0.4 mg capsule Take 0.8 mg by mouth nightly. Provider, Historical  
gentamicin (GARAMYCIN) 0.1 % topical ointment Apply to exit site  
daily. 3/16/20   Jaspreet Herrera NP  
aspirin delayed-release 81 mg tablet Take 1 Tab by mouth daily. 3/9/20   Jaspreet Herrera NP  
venlafaxine-SR [facility] 150 mg capsule Take 1 Cap by mouth  
daily (with breakfast). 3/9/20   Jaspreet Herrera NP  
therapeutic multivitamin [facility]) tablet Take 1 Tab by mouth  
daily. 3/9/20   Jaspreet Herrera NP  
magnesium oxide (MAG-OX) 400 mg tablet Take 2 Tabs by mouth two  
(2) times a day. 3/9/20   Jaspreet Herrera NP  
levETIRAcetam (KEPPRA) 250 mg tablet Take 1 Tab by mouth two (2)  
times a day. 3/9/20   Jaspreet Herrera NP  
L. acidoph & paracasei- S therm- Bifido (TIAN-Q/RISAQUAD) 8  
billion cell cap cap Take 1 Cap by mouth daily. 3/9/20 Yu , NP  
finasteride (PROSCAR) 5 mg tablet Take 1 Tab by mouth daily. 3/9/20   Jaspreet Herrera, NP  
cholecalciferol (VITAMIN D3) (1000 Units /25 mcg) tablet Take 2 Tabs by mouth daily. 3/9/20   Jaspreet Herrera NP  
atorvastatin (LIPITOR) 40 mg tablet Take 1 Tab by mouth daily.   
3/9/20   Yu Damon, NP  
 warfarin (COUMADIN) 3 mg tablet Take 1 Tab by mouth daily. May  
take additional tab as directed by provider. 3/9/20   Keiry Herrera NP  
albuterol (PROVENTIL HFA, VENTOLIN HFA, PROAIR HFA) 90  
mcg/actuation inhaler Take 2 Puffs by inhalation every six (6)  
hours as needed for Wheezing. 20   Bekah Sims NP  
warfarin (COUMADIN) 1 mg tablet Take 3 Tabs by mouth daily. May  
take additional tab as directed by provider. 20 Blake Augustine NP Allergies Allergen Reactions  Cefepime Other (comments)  
  myoclonus Review of Systems:  Pertinent items are noted in the History of  
Present Illness. Objective:  
 
Visit Vitals /84 Pulse 94 Temp 97.6 °F (36.4 °C) Resp 16 Ht 6' 2\" (1.88 m) Wt 83.6 kg (184 lb 4.9 oz) SpO2 97% BMI 23.66 kg/m² Temp (24hrs), Av.7 °F (37.1 °C), Min:97.5 °F (36.4 °C), Max:100.6 °F (38.1 °C) Lines:  Peripheral IV:    
 
Physical Exam:  Lungs:  clear to auscultation bilaterally Heart:  regular rate and rhythm Abdomen:  soft, non-tender. Bowel sounds normal. No masses,  no  
organomegaly Skin:  no rash or abnormalities LVAD site is dry Data Review: CBC: 
Recent Labs  
  20 
1146 WBC 6.2 5.9 GRANS  --  89* MONOS  --  5  
EOS  --  1 ANEU  --  5.2 ABL  --  0.3* HGB 8.6* 9.6* HCT 28.2* 30.4* * 142* BMP: 
Recent Labs  
  20 
1146 CREA 1.16 1.40* BUN 26* 27* * 134* K 4.3 4.2  101 CO2 25 23 AGAP 3* 10  
* 133* LFTS: 
Recent Labs  
  20 
1146 TBILI 0.7 0.7 ALT 17 15 SGOT 18 16 * 168* TP 6.7 7.3 ALB 2.3* 2.6* Microbiology:  
 
All Micro Results Procedure Component Value Units Date/Time Titajordin Tami [[id number]] Collected:  20 1154 Order Status:  Completed Specimen:  Urine Updated:  20  
1045   Special Requests: --     
 NO SPECIAL REQUESTS Reflexed from [id number] Culture result: No growth (<1,000 CFU/ML) CULTURE, BLOOD, PAIRED [[id number]]  (Abnormal) Collected:   
04/20/20 1204 Order Status:  Completed Specimen:  Blood Updated:  04/21/20  
0700 Special Requests: NO SPECIAL REQUESTS Culture result:    
  GRAM NEGATIVE RODS GROWING IN 2 OF 4 BOTTLES DRAWN (SITES = L  
AC AND R AC) REMAINING BOTTLE(S) HAS/HAVE NO GROWTH SO FAR URINE CULTURE HOLD SAMPLE [[id number]] Collected:  04/20/20 1154 Order Status:  Completed Specimen:  Serum Updated:  04/20/20  
1211 Urine culture hold Urine on hold in Microbiology dept for 2 days. If unpreserved  
urine is submitted, it cannot be used for addtional testing after 24 hours, recollection will be required. Imaging:  
Reviewed Signed By: Debbrah Hamman, MD   
 April 21, 2020 IP CONSULT TO UROLOGY Narrative Jeremy Dakins, NP     4/22/2020  3:06 PM 
Requesting Provider: Belkis Hubbard MD - Reason for Consultation: \"urinary retention, urinary frequency\" Pre-existing [facility] Patient:   Yes, Dr. Magalis Breen Patient: Sabine Quiñones MRN: [id number]  SSN: [id number] YOB: 1950  Age: 71 y.o. Sex: male Location: [facility] Code Status: Full Code PCP: Kiara Zhao MD  - [phone] Emergency Contact:  Primary Emergency Contact: Silvianojaquanenzo Vera, Stevie Phone: [phone] Race/Hoahaoism/Language: WHITE OR  /  / Speaks ENGLISH Payor: Payor: Swapna Garcia / Plan: [place] MEDICARE PART A & B /  
Product Type: Medicare /   
Prior Admission Data: 1/30/20 [facility] 4 CV SERVICES UNIT Mounika Altman Hospitalized:  Hospital Day: 3 - Admitted 4/20/2020 11:49 AM  
 
CONSULTANTS 
IP CONSULT TO INFECTIOUS DISEASES 
IP CONSULT TO UROLOGY ADMISSION DIAGNOSES 
  ICD-10-CM ICD-9-CM 1. Acute cystitis with hematuria N30.01 595.0 2. Chronic anticoagulation Z79.01 V58.61 3. LVAD (left ventricular assist device) present ([facility] [address] Z95.811 V43.21  
4. Bacteremia due to Gram-negative bacteria R78.81 790.7  
  041.85  
5. Acute cystitis without hematuria N30.00 595.0 6. Systolic CHF, chronic (HCC) I50.22 428.22  
  428.0 7. Peripheral vascular disease (HCC) I73.9 443.9 Assessment/Plan: · Urinary retention · Urinary frequency 
 
-Bladder scan pt after next void and record. 
-Place 16 fr coude' catheter for residual >400 mL. Trying to  
avoid catheter placement given past hx of gross hematuria. CC: Urinary Frequency; Fever; and Shortness of Breath HPI: He is a 71 y.o. male w/ a significant cardiac hx, cardiac  
arrest, V Fib, ischemic cardiomyopathy w/ EF of 34-10%,  
complicated hospital course I@ [employer] requiring LVAD, gross  
hematuria that required CBI, s/p cystoscopy in 11/2019 (no  
abnormal lesions found), chronic renal insufficiency w/  
creatinine of 2.3. He was seen by Dr. Faisal Davis in October 2019 w/  
weak stream and recurrent UTIs. He was bladder scanned for 500  
mL, started on tamsulosin. CT scans have been unremarkable. He  
has decined CIC in the past. Renal ultrasound in March 2020 shows  
no hydronephrosis and his bladder residual has improved to 240  
ml. 
CT on 4/21: The prostate is slightly prominent with some minimal  
calcification but no fluid collection. The bladder wall shows  
some irregularity at the base and mild distention. T 98. WBC 5.1. Hgb 9.6. Cr 1.08. BCX PSEUDOMONAS AERUGINOSA+ in 2/4 bottles Pt reports frequent urination w/o dysuria. Urine appears erica  
colored. External male catheter in place. Problem: urinary retention; Location: bladder; Quality:chronic, Severity: moderate; Timing:on going, Context: as above in hPI;  
Better/Worse: TBD, Associated s/s:none  
 
 
 
____________________________________________________ Note from Dr. Faisal Davis, 3/26/2020 Visit Type Established Telemedicine Type: Video ezAccess Context: He was last seen in 2009 when he had abnormal prostate  
exam and biopsy was negative at PSA of 0.8. He has a significant  
cardiac history, history of cardiac arrest V. fib 2011, ischemic  
cardiomyopathy with EF of 15 to 20%. He is on home IV milrinone. He has chronic renal insufficiency with creatinine of 2.3,  
partly due to brisk diuresis. I had seen him in October 2019  
with weak stream and recurrent UTIs Location: Prostate Duration: Several months Associated symptoms: He denies any postural hypotension. Denies  
any dizziness. No gross hematuria. Bladder scan was performed initially and it was about 500 mL. Started tamsulosin. He he has tolerated this very well. Reports  
his symptoms have improved dramatically. CT of the abdomen and  
pelvis unremarkable except left kidney was somewhat smaller and  
scarred. He had declined CIC in October and his bladder scan had improved  
with medication. Since then he had had a complicated medical  
course requiring hospitalization at [facility] for LVAD. During  
that time he also developed gross hematuria due to infection and  
required CBI and cystoscopy by Dr. Wing Garza in November 2019 when  
no abnormal lesions were found. He stayed in the hospital for  
close to 3 months and then transferred to [address] [place] [address] for rehab. During  
hospital stay he required CIC and condom catheter. He has been  
without a catheter for several weeks. Due to recurrent UTIs he  
has been taking ciprofloxacin twice daily for the last 1 month. He is also currently on Augmentin due to infection around LVAD  
site. PAST MEDICAL HISTORY: 
 
Allergies: No known allergies. DENIES: Latex, Shellfish, X-Ray Dye, Iodine. Medications: FINASTERIDE 5 MG ORAL TABLET (FINASTERIDE) 1 po  
daily; Route: ORAL 
COUMADIN 3 MG ORAL TABLET (WARFARIN SODIUM) ONCE DAILY; Route: ORAL 
 VENLAFAXINE HCL  MG ORAL CAPSULE EXTENDED RELEASE 24 HOUR  
(VENLAFAXINE HCL) ONCE DAILY; Route: ORAL 
FLOMAX 0.4 MG ORAL CAPSULE (TAMSULOSIN HCL) 2 caps  DAILY; Route: ORAL 
ENTRESTO 24-26 MG ORAL TABLET (SACUBITRIL-VALSARTAN) TWICE A DAY; Route: ORAL 
POTASSIUM CHLORIDE ER 10 MEQ ORAL TABLET EXTENDED RELEASE  
(POTASSIUM CHLORIDE) ONCE DAILY; Route: ORAL MAGNESIUM 400 MG ORAL TABLET (MAGNESIUM) Take 2 tabs by mouth 2x  
a day; Route: ORAL LEVETIRACETAM 250 MG ORAL TABLET (LEVETIRACETAM) ONCE DAILY; Route: ORAL HYDRALAZINE HCL 10 MG ORAL TABLET (HYDRALAZINE HCL) ONCE DAILY; Route: ORAL 
FINASTERIDE TABLET (FINASTERIDE TABS) ONCE DAILY; Route: ORAL 
CLONAZEPAM 0.5 MG ORAL TABLET (CLONAZEPAM) THREE TIMES A DAY; Route: ORAL 
CIPROFLOXACIN  MG ORAL TABLET (CIPROFLOXACIN HCL) TWICE A  
DAY; Route: ORAL 
ATORVASTATIN CALCIUM 40 MG ORAL TABLET (ATORVASTATIN CALCIUM) ONCE DAILY; Route: ORAL 
ASPIRIN LOW STRENGTH 81 MG ORAL TABLET CHEWABLE (ASPIRIN) ONCE  
DAILY; Route: ORAL ALBUTEROL SULFATE NEBULIZATION SOLUTION (ALBUTEROL SULFATE NEBU)  
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 DENIES: Diabetes, Bowel Problems, Stroke/Seizure, Kidney Problems, HIV, Hepatitis, or Cancer. Surgeries: Open Heart Surgery, Hernia Repair, and Vasectomy. Family History: DENIES: Prostate cancer, Kidney cancer, Kidney  
disease, Kidney stones. Social History: Retired. . Smoking status: former smoker. Does not drink alcohol. System Review: Admits to: Dry Mouth, Leg Swelling, Shortness of  
Breath, Difficulty Walking, Impaired Sex Drive, and Easy Bleeding. DENIES: Unexplained Weight Loss, Dry Eyes, Constipation, Involuntary Urine Loss, Lower Extremity Weakness, Dry Skin, Psychiatric Problems, Rash. Gen: alert, awake Head: Normocephalic, atraumatic Chest: Unlabored breathing URINALYSIS IMPRESSION: 
 
Lower urinary tract symptoms/BPH: He is currently on Augmentin  
bladder scan 343 mL. He does have urinary frequency and weak  
stream 
-He is doing well on maximal medical therapy including Flomax 0.8  
mg and finasteride 5 mg 
-He is very hesitant to do CIC particularly with his hematuria  
history and prolonged hospitalization. 
-Condom catheter particularly for nighttime. 
-Thankfully renal ultrasound in March 2020 shows no  
hydronephrosis and his bladder residual has improved to 240 ml Recurrent UTIs: He had a CT stone protocol which was unremarkable  
except somewhat atrophic left kidney PLAN: Continue Flomax and finasteride Use condom catheter at nighttime He will start Keflex 250 mg daily for prophylaxis after he missed  
his Augmentin. Follow-up 3 months for video visit. He was very thankful able to  
do this over video and not in person 
 
cc: Fran Segura MD 
Transcribed by Speech to Text Technology I personally spent 13 minutes providing the above telemedicine  
service via phone call. [employer] [facility] Telemedicine Est - Detailed (99237) COVID-19 Instructions provided to patient during telemedicine  
visit: 
 
Wash your hands frequently. - Patient instructed to frequently wash your hands with an  
alcohol based hand rub or wash and /or with soap and water for 20  
seconds. Maintain social distancing. 
- 6 feet distance between you and others. Avoid touching your eyes nose and mouth. Practice good respiratory hygiene 
-Cover your mouth and nose with your elbow or tissue when you  
cough or sneeze. Dispose of the use tissue immediately. Stay home if you feel unwell.  If you have a fever cough and  
 difficulty breathing call your PCP. Electronically signed by Prerna Garrison MD MPH on 2020 at 10:46 AM 
 
__________________________________________________________________ 
______ Temp (24hrs), Av.5 °F (36.9 °C), Min:98 °F (36.7 °C), Max:99  
°F (37.2 °C) Urinary Status: Voiding, External catheter Creatinine Date/Time Value Ref Range Status 2020 04:32 AM 1.08 0.70 - 1.30 MG/DL Final  
2020 03:47 AM 1.16 0.70 - 1.30 MG/DL Final  
2020 11:46 AM 1.40 (H) 0.70 - 1.30 MG/DL Final  
2020 12:00 AM 1.06 0.76 - 1.27 mg/dL Final  
2020 02:41 PM 1.09 0.70 - 1.30 MG/DL Final  
 
Current Antimicrobial Therapy (168h ago, onward) Ordered     Start Stop  
 20 1315  amoxicillin-clavulanate (AUGMENTIN) 875-125 mg  
per tablet 1 Tab  1 Tab,   Oral,   2 TIMES DAILY Note to Pharmacy:  OP SIG:Take 1 Tab by mouth two (2) times a  
day. 20 1800 --  
 20 1323  meropenem (MERREM) 500 mg in 0.9% sodium chloride  
(MBP/ADV) 50 mL  0.5 g,   IntraVENous,   EVERY 6 HOURS    
 20 1400 --  
  
 
Key Anti-Platelet Anticoagulant Meds   
    
  
 aspirin delayed-release 81 mg tablet Take 1 Tab by mouth daily. warfarin (COUMADIN) 3 mg tablet Take 1 Tab by mouth daily. May  
take additional tab as directed by provider. warfarin (COUMADIN) 1 mg tablet Take 3 Tabs by mouth daily. May  
take additional tab as directed by provider. Diet: DIET REGULAR 3-4 GM (JOSÉ LUIS) DIET NUTRITIONAL SUPPLEMENTS Lunch, Dinner; Ensure [employer] - % Diet Eaten: 80 % Labs Lab Results Component Value Date/Time  Lactic acid 1.2 2020 04:32 AM  
 Lactic acid 1.5 2020 01:59 PM  
 Lactic acid 1.8 2020 12:04 PM  
 WBC 5.1 2020 04:32 AM  
 HCT 31.0 (L) 2020 04:32 AM  
 PLATELET 660  04:32 AM  
 Sodium 134 (L) 2020 04:32 AM  
 Potassium 4.4 2020 04:32 AM  
 Chloride 100 2020 04:32 AM  
 CO2 27 04/22/2020 04:32 AM  
 BUN 28 (H) 04/22/2020 04:32 AM  
 Creatinine 1.08 04/22/2020 04:32 AM  
 Glucose 96 04/22/2020 04:32 AM  
 Calcium 9.1 04/22/2020 04:32 AM  
 Magnesium 2.1 04/22/2020 04:32 AM  
 INR 2.0 (H) 04/22/2020 04:32 AM  
 Prostate Specific Ag 0.3 10/25/2019 02:56 PM  
 
UA:  
Lab Results Component Value Date/Time Color DARK YELLOW 04/20/2020 11:54 AM  
 Appearance CLOUDY (A) 04/20/2020 11:54 AM  
 Specific gravity 1.018 04/20/2020 11:54 AM  
 Specific gravity 1.015 10/31/2019 11:00 AM  
 pH (UA) 5.5 04/20/2020 11:54 AM  
 Protein 100 (A) 04/20/2020 11:54 AM  
 Glucose Negative 04/20/2020 11:54 AM  
 Ketone Negative 04/20/2020 11:54 AM  
 Bilirubin Negative 04/20/2020 11:54 AM  
 Urobilinogen 1.0 04/20/2020 11:54 AM  
 Nitrites Negative 04/20/2020 11:54 AM  
 Leukocyte Esterase SMALL (A) 04/20/2020 11:54 AM  
 Epithelial cells FEW 04/20/2020 11:54 AM  
 Bacteria 1+ (A) 04/20/2020 11:54 AM  
 WBC 10-20 04/20/2020 11:54 AM  
 RBC 10-20 04/20/2020 11:54 AM  
 
Imaging Results for orders placed during the hospital encounter of  
04/20/20 CT PELV W CONT Narrative EXAM: CT PELV W CONT INDICATION: evaluate for prostate abscess COMPARISON: None. CONTRAST: 100 mL of Isovue-370. TECHNIQUE:  
Multislice helical CT of the pelvis was performed from the iliac  
crest to the 
symphysis pubis during uneventful rapid bolus intravenous  
contrast 
administration. Oral contrast was not administered. Coronal and  
sagittal 
reformations were generated. CT dose reduction was achieved  
through use of a 
standardized protocol tailored for this examination and automatic  
exposure 
control for dose modulation. FINDINGS: 
Prominent sized distal abdominal aorta measuring AP diameter of  
2.5 cm. Sigmoid diverticulosis. The prostate is slightly prominent with some minimal  
calcification but no fluid 
collection. The bladder wall shows some irregularity at the base and mild distention. Impression IMPRESSION: 
Slightly Prominent sized prostate without fluid collection. Irregular bladder floor US Results (most recent): 
Results from [place] encounter on 03/12/20 US ABD LTD Narrative Clinical history: Evaluate for fluid around LVAD. Real-time sonographic imaging of the entry site for the LVAD was  
performed. There is a small amount of circumferential fluid surrounding the  
LVAD at the 
entry site. Impression IMPRESSION: Small amount of fluid surrounding the  
LVAD at the entry site. Cultures All Micro Results Procedure Component Value Units Date/Time CULTURE, BLOOD, PAIRED [[id number]]  (Abnormal) Collected:   
04/20/20 1204 Order Status:  Completed Specimen:  Blood Updated:  04/22/20  
1201 Special Requests: NO SPECIAL REQUESTS Culture result:    
  PSEUDOMONAS AERUGINOSA GROWING IN 2 OF 4 BOTTLES DRAWN (SITES =  
LAC AND RAC) SENSITIVITY TO FOLLOW  
     
      
  REMAINING BOTTLE(S) HAS/HAVE NO GROWTH SO FAR  
     
 CULTURE, URINE [[id number]] Collected:  04/20/20 1154 Order Status:  Completed Specimen:  Urine Updated:  04/21/20  
1045 Special Requests: --     
  NO SPECIAL REQUESTS Reflexed from [id number] Culture result: No growth (<1,000 CFU/ML) URINE CULTURE HOLD SAMPLE [[id number]] Collected:  04/20/20 1154 Order Status:  Completed Specimen:  Serum Updated:  04/20/20  
1211 Urine culture hold Urine on hold in Microbiology dept for 2 days. If unpreserved  
urine is submitted, it cannot be used for addtional testing after 24 hours, recollection will be required. Past History: (Complete 2+/3 areas) Allergies Allergen Reactions  Cefepime Other (comments)  
  myoclonus Current Facility-Administered Medications Medication Dose Route Frequency  [START ON 4/23/2020] digoxin (LANOXIN) tablet 0.125 mg  0.125  
mg Oral DAILY  [START ON 4/23/2020] bumetanide (BUMEX) tablet 1 mg  1 mg Oral  
DAILY  warfarin (COUMADIN) tablet 4 mg  4 mg Oral ONCE  
 guar gum (BENEFIBER) packet 1 Packet  4 g Oral TID  albuterol-ipratropium (DUO-NEB) 2.5 MG-0.5 MG/3 ML  3 mL Nebulization Q6H PRN  
 amoxicillin-clavulanate (AUGMENTIN) 875-125 mg per tablet 1 Tab 1 Tab Oral BID  aspirin delayed-release tablet 81 mg  81 mg Oral DAILY  atorvastatin (LIPITOR) tablet 40 mg  40 mg Oral DAILY  cholecalciferol (VITAMIN D3) (1000 Units /25 mcg) tablet 2 Tab   
2,000 Units Oral DAILY  clonazePAM (KlonoPIN) tablet 0.25 mg  0.25 mg Oral BID  finasteride (PROSCAR) tablet 5 mg  5 mg Oral DAILY  gentamicin (GARAMYCIN) 0.1 % cream   Topical DAILY  lactobac ac& pc-s.therm-b.anim (TIAN Q/RISAQUAD)  1 Cap Oral  
DAILY  levETIRAcetam (KEPPRA) tablet 250 mg  250 mg Oral Q12H  
 magnesium oxide (MAG-OX) tablet 800 mg  800 mg Oral BID  potassium chloride SR (KLOR-CON 10) tablet 10 mEq  10 mEq Oral  
DAILY  sacubitriL-valsartan (ENTRESTO) 24-26 mg tablet 1 Tab  1 Tab Oral BID  tamsulosin (FLOMAX) capsule 0.8 mg  0.8 mg Oral QHS  therapeutic multivitamin (THERAGRAN) tablet 1 Tab  1 Tab Oral  
DAILY  venlafaxine-SR (EFFEXOR-XR) capsule 150 mg  150 mg Oral DAILY  
WITH BREAKFAST  sodium chloride (NS) flush 5-40 mL  5-40 mL IntraVENous Q8H  
 sodium chloride (NS) flush 5-40 mL  5-40 mL IntraVENous PRN  
 acetaminophen (TYLENOL) tablet 650 mg  650 mg Oral Q4H PRN  
 oxyCODONE-acetaminophen (PERCOCET) 5-325 mg per tablet 1 Tab  1 Tab Oral Q4H PRN  
 naloxone (NARCAN) injection 0.4 mg  0.4 mg IntraVENous PRN  
 ondansetron (ZOFRAN) injection 4 mg  4 mg IntraVENous Q4H PRN  
 hydrALAZINE (APRESOLINE) 20 mg/mL injection 10 mg  10 mg IntraVENous Q4H PRN  
 hydrALAZINE (APRESOLINE) 20 mg/mL injection 20 mg  20 mg IntraVENous Q4H PRN  
 meropenem (MERREM) 500 mg in 0.9% sodium chloride (MBP/ADV) 50 mL  0.5 g IntraVENous Q6H  Warfarin NP Dosing   Other PRN Prior to Admission medications Medication Sig Start Date End Date Taking? Authorizing Provider  
budesonide (PULMICORT) 0.5 mg/2 mL nbsp 500 mcg by Nebulization  
route every evening. Yes Provider, Historical  
albuterol-ipratropium (DUO-NEB) 2.5 mg-0.5 mg/3 ml nebu 3 mL by  
Nebulization route nightly. Yes Provider, Historical  
potassium chloride SR (KLOR-CON 10) 10 mEq tablet Take 1 Tab by  
mouth daily. 4/7/20   Winston Herrera NP  
clonazePAM (KlonoPIN) 0.5 mg tablet Take 0.5 Tabs by mouth two  
(2) times a day. Max Daily Amount: 0.5 mg. 4/7/20   Winston Herrera NP  
sacubitriL-valsartan Bryce Master) 24-26 mg tablet Take 1 Tab by  
mouth two (2) times a day. 4/7/20   Winston Herrera NP  
amoxicillin-clavulanate (AUGMENTIN) 875-125 mg per tablet Take 1 Tab by mouth two (2) times a day. 3/25/20   Geraldine Sims NP  
tamsulosin (Flomax) 0.4 mg capsule Take 0.8 mg by mouth nightly. Provider, Historical  
gentamicin (GARAMYCIN) 0.1 % topical ointment Apply to exit site  
daily. 3/16/20   Winston Herrera NP  
aspirin delayed-release 81 mg tablet Take 1 Tab by mouth daily. 3/9/20   Winston Herrera NP  
venlafaxine-SR [facility] 150 mg capsule Take 1 Cap by mouth  
daily (with breakfast). 3/9/20   Winston Herrera NP  
therapeutic multivitamin [facility]) tablet Take 1 Tab by mouth  
daily. 3/9/20   Winston Herrera NP  
magnesium oxide (MAG-OX) 400 mg tablet Take 2 Tabs by mouth two  
(2) times a day. 3/9/20   Winston Herrera NP  
levETIRAcetam (KEPPRA) 250 mg tablet Take 1 Tab by mouth two (2)  
times a day. 3/9/20   Winston Herrera NP  
L. acidoph & paracasei- S therm- Bifido (TIAN-Q/RISAQUAD) 8  
billion cell cap cap Take 1 Cap by mouth daily. 3/9/20 Celeste Ibarra NP  
finasteride (PROSCAR) 5 mg tablet Take 1 Tab by mouth daily.   
3/9/20   Celeste Ibarra NP  
 cholecalciferol (VITAMIN D3) (1000 Units /25 mcg) tablet Take 2 Tabs by mouth daily. 3/9/20   Polliard, Corinne Raja, NP  
atorvastatin (LIPITOR) 40 mg tablet Take 1 Tab by mouth daily. 3/9/20   Polliard, Corinne Raja, NP  
warfarin (COUMADIN) 3 mg tablet Take 1 Tab by mouth daily. May  
take additional tab as directed by provider. 3/9/20   Polliard, Corinne Raja, NP  
albuterol (PROVENTIL HFA, VENTOLIN HFA, PROAIR HFA) 90  
mcg/actuation inhaler Take 2 Puffs by inhalation every six (6)  
hours as needed for Wheezing. 2/26/20   Hawk Sims NP  
warfarin (COUMADIN) 1 mg tablet Take 3 Tabs by mouth daily. May  
take additional tab as directed by provider. 2/25/20 Mikey Oliveira NP  
  
 
PMHx:  has a past medical history of Degenerative disc disease,  
lumbar, Heart failure ([facility] [address] High cholesterol, Hypertension,  
Paroxysmal atrial fibrillation ([facility] [address] (4/2/2019), and Spinal  
stenosis. PSurgHx:  has a past surgical history that includes hx coronary  
artery bypass graft; hx appendectomy; hx hernia repair; hx  
implantable cardioverter defibrillator; pr cardioversion elective  
arrhythmia external (N/A, 6/10/2019); pr cardioversion elective  
arrhythmia external (N/A, 6/18/2019); pr insj/rplcmt perm dfb  
w/trnsvns lds 1/dual chmbr (N/A, 6/21/2019); pr tcat impl wrls  
p-art prs snr l-t hemodyn mntr (N/A, 9/18/2019); colonoscopy (Left, 11/11/2019); hx heart assist device (Left, 10/29/2019); hx  
heart assist device (Left, 11/18/2019); and hx thrombectomy (Left, 11/25/2019). PSocHx:  reports that he quit smoking about 9 years ago. He has  
never used smokeless tobacco. He reports previous alcohol use. He  
reports that he does not use drugs. ROS:  (Complete - 10 systems) - DENIES: Weightloss  
(Constitutional), Dry mouth (ENMT), Chest pain (CV), SOB  
(Respiratory), Constipation (GI), Weakness (MS), Pallor (Skin),  
TIA Sx (Neuro), Confusion (Psych), Easy bruising (Heme) Physical Exam: (Comprehesive - 8+ 1995 Systems)  
 
(1) Constitutional:  FIO2: FIO2 (%): 28 % on SpO2: O2 Sat (%): 96  
% O2 Device: Room air O2 Flow Rate (L/min): 4 l/min Patient Vitals for the past 24 hrs: 
 Temp Pulse Resp SpO2 Weight 04/22/20 1101 98 °F (36.7 °C) 99 18 96 %   
04/22/20 0441     83 kg (182 lb 15.7 oz) 04/22/20 0416 98.7 °F (37.1 °C) (!) 110 18 94 %   
04/21/20 2313 99 °F (37.2 °C) (!) 105 18 91 %   
04/21/20 2013 98.6 °F (37 °C) 94 18 92 %   
04/21/20 1502 98 °F (36.7 °C) 83 18 97 %  Date 04/21/20 0700 - 04/22/20 3850 04/22/20 0700 - 04/23/20 5112 Shift 7469-6230 6797-8548 24 Hour Total 9039-9001 3158-9189 38 Hour Total  
INTAKE  
P.O.  240  240  
  P. O.  240  240 Shift Total(mL/kg) 540(6.5) 720(8.7) 4188(20.6) 240(2.9) 240(2.9) OUTPUT Urine(mL/kg/hr) 800(0.8) 2550(2.6) 3350(1.7) Urine Occurrence(s) 1 x  1 x Urine Output (mL) (Condom Catheter 04/20/20) 800 2550 3350 Stool Stool Occurrence(s) 1 x 1 x 2 x 1 x  1 x Shift Total(mL/kg) 800(9.6) 2550(30.7) 3350(40.4) NET -260 -7397 -5837 240  240 Weight (kg) 83.6 83 83 83 83 83  
  
(2) ENMT:   moist mucous membranes, normal sinuses (3) Respiratory:  breathing easily, no distress (4) GI:  no abdominal masses, tenderness  
(5) :   Urinary frequency, condom cath in place (6) Lymphatic:  no adenopathy, neck supple  
(7) Muscloskeletal:  no gross deformity, normal ROM  
(8) Skin:  no rash, warm & dry  
(9) Neuro:  no focal deficits, normal speech Signed By: Amador Johnson NP  - April 22, 2020 EKG, 12 LEAD, INITIAL Result Value Ref Range Ventricular Rate 121 BPM  
 Atrial Rate 121 BPM  
 QRS Duration 20 ms  
 Q-T Interval 326 ms  
 QTC Calculation (Bezet) 462 ms Calculated R Axis 91 degrees Calculated T Axis 147 degrees Diagnosis ** Poor data quality, interpretation may be adversely affected Ventricular-paced rhythm Biventricular pacemaker detected When compared with ECG of 22-DEC-2019 18:48, 
Vent. rate has increased BY  43 BPM 
Confirmed by Norbert Lafleur M.D., Tarangigi Esperanza ([id number]) on 4/20/2020 3:02:00 PM 
  
ECHO ADULT COMPLETE Result Value Ref Range Tapse 1.15 (A) 1.5 - 2.0 cm Ao Root D 3.95 cm LVIDd 6.84 (A) 4.2 - 5.9 cm  
 LVPWd 0.90 0.6 - 1.0 cm LVIDs 6.26 cm IVSd 0.82 0.6 - 1.0 cm Left Atrium to Aortic Root Ratio 1.25   
 RVIDd 3.06 cm  
 LV Mass .0 (A) 88 - 224 g LV Mass AL Index 147.3 49 - 115 g/m2 Left Atrium Major Axis 4.94 cm Triscuspid Valve Regurgitation Peak Gradient 37.4 mmHg Pulmonic Valve Max Velocity 100.20 cm/s  
 TR Max Velocity 305.97 cm/s Left Ventricular Fractional Shortening by 2D [id number] % PV peak gradient 4.0 mmHg Narrative · Image quality for this study was technically difficult. · Normal wall thickness. Severely dilated left ventricle. Severe systolic  
function. Estimated left ventricular ejection fraction is <15%. LVAD  
present. 5800 rpm 
· Dilated left atrium. · RV Not well visualized. Mildly reduced systolic function. · Mild aortic valve regurgitation is present. Aortic valve does not open  
in systole. · Mild mitral valve regurgitation is present. · Mild to moderate tricuspid valve regurgitation is present. · Severely elevated central venous pressure (15+ mmHg); IVC diameter is  
larger than 21 mm and collapses less than 50% with respiration. Physical Exam  
Constitutional: He is oriented to person, place, and time. He appears well-developed and well-nourished. No distress. HENT:  
Head: Normocephalic. Eyes: Pupils are equal, round, and reactive to light. Neck: Normal range of motion. No JVD present. Cardiovascular: Regular rhythm and normal heart sounds. Tachycardia present. + VAD sounds Pulmonary/Chest: Effort normal. No respiratory distress. Abdominal: Soft. Bowel sounds are normal. He exhibits no distension. Musculoskeletal: Normal range of motion. General: No edema. Neurological: He is alert and oriented to person, place, and time. Skin: Skin is warm. He is not diaphoretic. Psychiatric: He has a normal mood and affect. Nursing note and vitals reviewed. PAST MEDICAL HISTORY: 
Past Medical History:  
Diagnosis Date  Degenerative disc disease, lumbar  Heart failure ([facility]  High cholesterol  Hypertension  Paroxysmal atrial fibrillation ([facility] [address] [facility] 4/2/2019  Spinal stenosis PAST SURGICAL HISTORY: 
Past Surgical History:  
Procedure Laterality Date  COLONOSCOPY Left 11/11/2019 COLONOSCOPY at bedside performed by Paco Huizar MD at [address]  HX CORONARY ARTERY BYPASS GRAFT    
 triple  HX HEART ASSIST DEVICE Left 10/29/2019 Impella 5.0  
 HX HEART ASSIST DEVICE Left 11/18/2019 HeartMate 3  
 HX HERNIA REPAIR    
 HX IMPLANTABLE CARDIOVERTER DEFIBRILLATOR  HX THROMBECTOMY Left 11/25/2019 Left brachial thrombectomy  WI CARDIOVERSION ELECTIVE ARRHYTHMIA EXTERNAL N/A 6/10/2019 EP CARDIOVERSION performed by Sarai Farooq MD at [address] CATH LAB  WI CARDIOVERSION ELECTIVE ARRHYTHMIA EXTERNAL N/A 6/18/2019 EP CARDIOVERSION performed by Jossie Renteria MD at [address] CATH LAB  WI INSJ/RPLCMT PERM DFB W/TRNSVNS LDS 1/DUAL CHMBR N/A 6/21/2019 INSERT ICD BIV MULTI performed [place] Shakira Lugo [place] [address] CATH LAB  WI TCAT IMPL WRLS P-ART PRS SNR L-T HEMODYN MNTR N/A 9/18/2019 IMPLANT HEART FAILURE MONITORING DEVICE performed by Landon Espinoza MD [place] [address] CATH LAB FAMILY HISTORY: 
Family History Problem Relation Age of Onset  Lung Disease Mother  Hypertension Mother [facility] Arthritis-osteo Mother  Heart Disease Mother  Heart Disease Father  Diabetes Father SOCIAL HISTORY: 
Social History Socioeconomic History  Marital status:   
 Spouse name: Not on file  Number of children: Not on file  Years of education: Not on file  Highest education level: Not on file Tobacco Use  Smoking status: Former Smoker Last attempt to quit: 2010 Years since quittin.4  Smokeless tobacco: Never Used Substance and Sexual Activity  Alcohol use: Not Currently Comment: rarely  Drug use: Never Other Topics Concern ALLERGY: 
Allergies Allergen Reactions  Cefepime Other (comments)  
  myoclonus CURRENT MEDICATIONS: 
 
Current Facility-Administered Medications:  
  digoxin (LANOXIN) tablet 0.125 mg, 0.125 mg, Oral, DAILY, Polliard, Jaspal LOPEZ, NP, 0.125 mg at 20 0833 
  guar gum (BENEFIBER) packet 1 Packet, 4 g, Oral, TID, Darren Herrera NP, 1 Packet at 20 7612 
  albuterol-ipratropium (DUO-NEB) 2.5 MG-0.5 MG/3 ML, 3 mL, Nebulization, Q6H PRN, Levi, Shana B, NP 
  amoxicillin-clavulanate (AUGMENTIN) 875-125 mg per tablet 1 Tab, 1 Tab, Oral, BID, Levi, Shana B, NP, 1 Tab at 20 1327   aspirin delayed-release tablet 81 mg, 81 mg, Oral, DAILY, Levi, Shana B, NP, 81 mg at 20 0833 
  atorvastatin (LIPITOR) tablet 40 mg, 40 mg, Oral, DAILY, Levi, Shnaa B, NP, 40 mg at 20 5822   cholecalciferol (VITAMIN D3) (1000 Units /25 mcg) tablet 2 Tab, 2,000 Units, Oral, DAILY, Levi, Shana B, NP, 2 Tab at 20 9673   clonazePAM (KlonoPIN) tablet 0.25 mg, 0.25 mg, Oral, BID, Levi, Shana B, NP, 0.25 mg at 20 1669   finasteride (PROSCAR) tablet 5 mg, 5 mg, Oral, DAILY, Levi, Shana B, NP, 5 mg at 20 0925 
  gentamicin (GARAMYCIN) 0.1 % cream, , Topical, DAILY, Levi, Shana B, NP 
  lactobac ac& pc-s.therm-b.anim (TIAN Q/RISAQUAD), 1 Cap, Oral, DAILY, Shana Sims, NP, 1 Cap at 20 4914   levETIRAcetam (KEPPRA) tablet 250 mg, 250 mg, Oral, Q12H, Shana Sims, NP, 250 mg at 20 9971   magnesium oxide (MAG-OX) tablet 800 mg, 800 mg, Oral, BID, Levi, Shana B, NP, 800 mg at 04/23/20 2615   sacubitriL-valsartan (ENTRESTO) 24-26 mg tablet 1 Tab, 1 Tab, Oral, BID, Levi, Shana B, NP, 1 Tab at 04/23/20 8722   tamsulosin (FLOMAX) capsule 0.8 mg, 0.8 mg, Oral, QHS, Levi, Shana B, NP, 0.8 mg at 04/22/20 2132   therapeutic multivitamin (THERAGRAN) tablet 1 Tab, 1 Tab, Oral, DAILY, Levi, Shana B, NP, 1 Tab at 04/23/20 0833 
  venlafaxine-SR (EFFEXOR-XR) capsule 150 mg, 150 mg, Oral, DAILY WITH BREAKFAST, Levi, Shana B, NP, 150 mg at 04/23/20 6307   sodium chloride (NS) flush 5-40 mL, 5-40 mL, IntraVENous, Q8H, Levi, Shana B, NP, 10 mL at 04/23/20 1434   sodium chloride (NS) flush 5-40 mL, 5-40 mL, IntraVENous, PRN, Levi, Shana B, NP 
  acetaminophen (TYLENOL) tablet 650 mg, 650 mg, Oral, Q4H PRN, Levi, Shana B, NP, 650 mg at 04/22/20 2347   oxyCODONE-acetaminophen (PERCOCET) 5-325 mg per tablet 1 Tab, 1 Tab, Oral, Q4H PRN, Levi, Shana B, NP 
  naloxone (NARCAN) injection 0.4 mg, 0.4 mg, IntraVENous, PRN, Levi, Shana B, NP 
  ondansetron (ZOFRAN) injection 4 mg, 4 mg, IntraVENous, Q4H PRN, Levi, Shana B, NP 
  hydrALAZINE (APRESOLINE) 20 mg/mL injection 10 mg, 10 mg, IntraVENous, Q4H PRN, Levi, Shana B, NP 
  hydrALAZINE (APRESOLINE) 20 mg/mL injection 20 mg, 20 mg, IntraVENous, Q4H PRN, Levi, Shana B, NP 
  meropenem (MERREM) 500 mg in 0.9% sodium chloride (MBP/ADV) 50 mL, 0.5 g, IntraVENous, Q6H, Shana Sims NP, Last Rate: 100 mL/hr at 04/23/20 1452, 500 mg at 04/23/20 1452   Warfarin NP Dosing, , Other, PRN, Westley Jalloh MD 
 
 
Thank you for letting us see him with you, TIERRA Aleg[address] [place] [address], [address] Office [phone] Fax [phone] [facility] ATTENDING ADDENDUM 
 
 Patient was seen and examined in person. Data and notes were reviewed. I have discussed and agree with the plan as noted in the NP note above without further additions. Fifi Alcantara MD PhD 
Myriam Mancera 9 [facility]

## 2020-04-23 NOTE — PROGRESS NOTES
1930: Bedside and Verbal shift change report given to Tammie Greenberg RN (oncoming nurse) by Sandeep Davila RN (offgoing nurse). Report included the following information SBAR, Kardex, Intake/Output, MAR, Recent Results and Cardiac Rhythm Paced/Afib.  
0550: Patient voided 100ml. PVR 340ml. 0730: Bedside and Verbal shift change report given to Luzma Burch RN (oncoming nurse) by Tammie Greenberg RN (offgoing nurse). Report included the following information SBAR, Kardex, Intake/Output, MAR, Recent Results and Cardiac Rhythm Paced/Afib.

## 2020-04-23 NOTE — PROGRESS NOTES
1530 Received bedside report from [facility], RN. Assumed care. Pt is resting in bed. 
 
1800 Pt refused dinner, stated ate his lunch late. Uneventful 4 hours. 1945 Bedside and Verbal shift change report given to [facility], RN and Ana Rosa Garcia RN (oncoming nurse) by Cecilia Oliver RN (offgoing nurse). Report included the following information SBAR, Kardex, Intake/Output, MAR, Recent Results and Cardiac Rhythm Afib/Paced.

## 2020-04-23 NOTE — PROGRESS NOTES
Patient: Ana Wolff MRN: [id number]  SSN: [id number] YOB: 1950  Age: 71 y.o. Sex: male ADMITTED: 2020 to Joseline Molina MD by Baldo Beverly MD for Sepsis [facility]) [A41.9] Pt seen for cc of urinary frequency, urinary retention.  
 
-Bladder scanned for 340 mL this am. Hx of incomplete bladder emptying. 
-On proscar/flomax 
-Pt continues to experience urinary frequency. -WBC WNL 
-Cr WNL 
-UCX from  (-), however pt had been on abx.  
-Per H&P, it was stated Urology stopped Cipro that he had been taking twice daily for the past month. This is a misunderstanding and Urology did not stop Cipro. He was also on Augmentin d/t infection around LVAD site. Urology started Keflex after he had missed his Augmentin. Vitals: Temp (24hrs), Av.2 °F (36.8 °C), Min:97.1 °F (36.2 °C), Max:99.6 °F (37.6 °C) Blood pressure 126/84, pulse (!) 102, temperature 98 °F (36.7 °C), resp. rate 20, height 6' 2\" (1.88 m), weight 83.2 kg (183 lb 6.8 oz), SpO2 97 %. Intake and Output: 
1901 -  0700 In: 3277 [P.O.:960; I.V.:450] Out: 4150 [facility]  07 - 1900 In: -  
Out: 200 [Urine:200] THERESE Output lats 24 hrs: No data found. THERESE Output last 8 hrs: No data found. BM over last 24 hrs: No data found. Exam:  
Appearance: Well-developed no acute distress Conjunctiva: Conjunctiva and lids normal 
External ears/nose: Normal no lesions or deformities Hearing: Grossly intact Respiratory effort: Breathing easily Abdomen/flank: Soft, nontender, without masses, no CVA tenderness Digits/nails: No clubbing cyanosis or petechiae Skin inspection: Warm and dry Sensation: No sensory deficits Judgment/Insight: intact Mood/Affect: normal 
 
Labs: CBC:  
Lab Results Component Value Date/Time WBC 4.6 2020 03:20 AM  
 HCT 29.8 (L) 2020 03:20 AM  
 PLATELET 197  03:20 AM  
 
BMP:  
Lab Results Component Value Date/Time Glucose 103 (H) 04/23/2020 03:20 AM  
 Sodium 135 (L) 04/23/2020 03:20 AM  
 Potassium 4.7 04/23/2020 03:20 AM  
 Chloride 103 04/23/2020 03:20 AM  
 CO2 27 04/23/2020 03:20 AM  
 BUN 26 (H) 04/23/2020 03:20 AM  
 Creatinine 0.99 04/23/2020 03:20 AM  
 Calcium 8.7 04/23/2020 03:20 AM  
 
Cultures: UCX negative Assessment/Plan: 1. Urinary retention 2. Urinary frequency 
 
-Bladder scanned for 340 mL this am. 
-Cr wnl  
-Repeat UCX ordered for c/o continued urinary frequency. Denies dysuria. Hx of recurrent UTI. -Bladder scan PRN 
-Place 16 fr coude' catheter for residual >400 mL. Trying to avoid catheter placement given past hx of gross hematuria. Signed By: Antoine Wallace NP - April 23, 2020

## 2020-04-23 NOTE — PROGRESS NOTES
Problem: Self Care Deficits Care Plan (Adult) Goal: *Acute Goals and Plan of Care (Insert Text) Description:  
FUNCTIONAL STATUS PRIOR TO ADMISSION: Patient with history of LVAD placement 11/2019, then d/c to [facility] rehab, then d/c home with wife in 2/2020. Patient reports he had progressed to modified independent level for ADLs and mobility, using RW only when fatigued, and endorses 3 falls since returning home. Patient reports he performed LVAD switchovers without assistance at home, that he had not progressed to showering yet, and he was active, for example mowing the lawn with a riding mower. HOME SUPPORT: Patient lived with his wife but did not require assistance Occupational Therapy Goals Initiated 4/21/2020 1. Patient will perform grooming standing at sink with with modified independence within 7 day(s). 2.  Patient will perform lower body dressing with modified independence within 7 day(s). 3.  Patient will perform bathing with modified independence within 7 day(s). 4.  Patient will perform toilet transfers with modified independence within 7 day(s). 5.  Patient will perform all aspects of toileting with modified independence within 7 day(s). 6.  Patient will participate in upper extremity therapeutic exercise/activities with independence for 10 minutes within 7 day(s). 7.  Patient will utilize energy conservation techniques during functional activities with verbal cues within 7 day(s). 8.  Patient will utilize fall prevention techniques during functional activities with verbal cues within 7 days. Outcome: Progressing Towards Goal 
 
OCCUPATIONAL THERAPY TREATMENT Patient: Christianne Handley (75 y.o. male) Date: 4/23/2020 [facility] [A41.9] <principal problem not specified> Precautions: Fall Chart, occupational therapy assessment, plan of care, and goals were reviewed. ASSESSMENT Patient continues with skilled OT services and is progressing towards goals. Pt continues to demonstrate generalized weakness, especially in LEs with B knee softening with fatigue L > R due to L knee DJD, risk for falls and mildly decreased safety awareness in bathroom. Pt required min A to stand from commode with pt noted pulling up on RW rather than use of L grab bar. Verbal cues provided for RW management for standing ADLs at sink. HR up to 121 with activity, A. Fib but pt asymptomatic. Current Level of Function Impacting Discharge (ADLs): CGA to min A for transfers, supervision with min cues for  from PM to batteries, CGA standing clothing management Other factors to consider for discharge: living at home with wife, LVAD PLAN : 
Patient continues to benefit from skilled intervention to address the above impairments. Continue treatment per established plan of care. to address goals. Recommend with staff: OOB to chair and bathroom with A x 1 and RW 
 
Recommend next OT session: standing ADLs Recommendation for discharge: (in order for the patient to meet his/her long term goals) Occupational therapy at least 2 days/week in the home This discharge recommendation: 
Has been made in collaboration with the attending provider and/or case management IF patient discharges home will need the following DME: none SUBJECTIVE:  
Patient stated I want to shave later.  OBJECTIVE DATA SUMMARY:  
Cognitive/Behavioral Status: 
Neurologic State: Alert Orientation Level: Oriented X4 Cognition: Appropriate decision making; Appropriate safety awareness Functional Mobility and Transfers for ADLs: 
Bed Mobility: 
  
OOB in chair Transfers: 
Sit to Stand: Contact guard assistance Functional Transfers Toilet Transfer : Minimum assistance(for standing, decreased safety awareness)- put attempted to pull up on RW Balance: 
Sitting: Intact Standing: Impaired Standing - Static: Fair Standing - Dynamic : Fair ADL Intervention: Patient demonstrated switchover from power module to battery in prep for ADL routine with supervision and min verbal cues. Demonstrated the following tasks:  
 Independent Verbal cues Physical assistance Comments Check PM & SC X Ensure  clipped onto stabilization belt Remove front (green lighted) batteries from , place back batteries into front slots    N/A OT assisted due to positioning of equipment Check batteries X Position batteries into battery clips X Don holster vest    Already donned upon arrival  
Disconnect power leads X X  Cues for pulling proper area Insert power lead into battery clip X [place] batteries in holster X Secure batteries with Velcro and clips X Grooming Washing Hands: Contact guard assistance Lower Body Dressing Assistance Socks: Supervision Slip on Shoes with Back: Supervision Position Performed: Bending forward method Toileting Bladder Hygiene: Modified independent Clothing Management: Contact guard assistance Cues: Tactile cues provided;Verbal cues provided Adaptive Equipment: Walker;Grab bars Activity Tolerance:  
Good- HR 100s at rest, 121 at rest with A.Fib. pt without c/o Please refer to the flowsheet for vital signs taken during this treatment. After treatment patient left in no apparent distress:  
Sitting in chair and Call bell within reach COMMUNICATION/COLLABORATION:  
The patients plan of care was discussed with: Physical therapist and Registered nurse. Mirella Garrett, SUDHEER Time Calculation: 23 mins

## 2020-04-23 NOTE — PROGRESS NOTES
Problem: Mobility Impaired (Adult and Pediatric) Goal: *Acute Goals and Plan of Care (Insert Text) Description: FUNCTIONAL STATUS PRIOR TO ADMISSION: Patient was modified independent using a rolling walker for functional mobility. HOME SUPPORT PRIOR TO ADMISSION: The patient lived with spouse but did not require assist. 
 
Physical Therapy Goals Initiated 4/21/2020 1. Patient will move from supine to sit and sit to supine  in bed with independence within 7 day(s). 2.  Patient will transfer from bed to chair and chair to bed with modified independence using the least restrictive device within 7 day(s). 3.  Patient will perform sit to stand with modified independence within 7 day(s). 4.  Patient will ambulate with modified independence for 150 feet with the least restrictive device within 7 day(s). 5.  Patient will ascend/descend 2 stairs with 1 handrail(s) with modified independence within 7 day(s). Outcome: Progressing Towards Goal 
 PHYSICAL THERAPY TREATMENT Patient: Perez Zimmerman (75 y.o. male) Date: 4/23/2020 Diagnosis: Sepsis ([facility] [A41.9] <principal problem not specified> Precautions: Fall Chart, physical therapy assessment, plan of care and goals were reviewed. ASSESSMENT Patient continues with skilled PT services and is progressing towards goals. Patient continues to be limited by generalized weakness and some L LE softening with increased distance. Amb approx 150 feet with RW and CGA with slow genna and no overt LOB but needing 1 standing rest break. Patients balance is overall fair and would recommend having RN or tech with patient to ambulate to and from the restroom. Will continue to follow. Current Level of Function Impacting Discharge (mobility/balance): CGA for transfers and gait with RW Other factors to consider for discharge: at risk for falls, need some assist for safety PLAN : 
 Patient continues to benefit from skilled intervention to address the above impairments. Continue treatment per established plan of care. to address goals. Recommendation for discharge: (in order for the patient to meet his/her long term goals) Physical therapy at least 2 days/week in the home IF patient discharges home will need the following DME: patient owns DME required for discharge SUBJECTIVE:  
Patient stated I'm doing better.  OBJECTIVE DATA SUMMARY:  
Critical Behavior: 
Neurologic State: Alert, Appropriate for age Orientation Level: Oriented X4 Cognition: Appropriate for age attention/concentration, Appropriate decision making, Follows commands Safety/Judgement: Awareness of environment, Decreased awareness of need for assistance, Decreased awareness of need for safety, Fall prevention, Insight into deficits Functional Mobility Training: 
Bed Mobility: 
  
  
  
  
  
  
Transfers: 
Sit to Stand: Contact guard assistance Stand to Sit: Contact guard assistance Balance: 
Sitting: Intact Standing: Impaired Standing - Static: Fair Standing - Dynamic : Fair Ambulation/Gait Training: 
Distance (ft): 150 Feet (ft) Assistive Device: Gait belt;Walker, rolling Ambulation - Level of Assistance: Contact guard assistance Gait Abnormalities: Decreased step clearance Base of Support: Center of gravity altered; Widened Speed/Jacqueline: Pace decreased (<100 feet/min) Step Length: Left shortened;Right shortened Pain Rating: 
No c/o pain Activity Tolerance:  
Fair and requires rest breaks Please refer to the flowsheet for vital signs taken during this treatment. After treatment patient left in no apparent distress:  
Sitting in chair and Call bell within reach COMMUNICATION/COLLABORATION:  
The patients plan of care was discussed with: Physical therapist, Occupational therapist, and Registered nurse.   
 
Chandler Bella, PT, DPT 
 Time Calculation: 23 mins

## 2020-04-23 NOTE — PROGRESS NOTES
ID Progress Note 2020 Subjective:  
 
States that he is feeling some better today Objective: Antibiotics: 1. Dana Kettle 2. Augmentin Vitals:  
Visit Vitals /84 Pulse 86 Temp 98.5 °F (36.9 °C) Resp 18 Ht 6' 2\" (1.88 m) Wt 83.2 kg (183 lb 6.8 oz) SpO2 97% BMI 23.55 kg/m² Tmax:  Temp (24hrs), Av.3 °F (36.8 °C), Min:97.1 °F (36.2 °C), Max:99.6 °F (37.6 °C) Exam:  Lungs:  clear to auscultation bilaterally Heart:  regular rate and rhythm Abdomen:  soft, non-tender. Bowel sounds normal. No masses,  no organomegaly Urine is clearing up Labs:     
Recent Labs  
  20 
0320 20 
8416 20 
7978 WBC 4.6 5.1 6.2 HGB 9.3* 9.6* 8.6*  
 152 133* BUN 26* 28* 26* CREA 0.99 1.08 1.16  
SGOT 17 20 18 * 164* 164* TBILI 0.4 0.5 0.7 Cultures: No results found for: SDES Lab Results Component Value Date/Time Culture result: (A) 2020 12:04 PM  
  PSEUDOMONAS AERUGINOSA GROWING IN 2 OF 4 BOTTLES DRAWN (SITES = LAC AND RAC) Culture result: REMAINING BOTTLE(S) HAS/HAVE NO GROWTH SO FAR 2020 12:04 PM  
 Culture result: No growth (<1,000 CFU/ML) 2020 11:54 AM  
 
 
Radiology:  
 
Line/Insert Date:        
 
Assessment: 1. Pseudomonas bacteremia--line vs urine (history of uti in the past) 2. LVAD drive line infection 3. CHF Objective: 1. Continue current therapy 2. Follow up cultures 3. He can go home on oral cipro 750 bid indefinitely along with his chronic therapy with Augmentin Larry Holly MD

## 2020-04-24 NOTE — PROGRESS NOTES
Notified Haydee Gosselin of the patient's discharge today and need for resumption of home health services tomorrow. Faxed [facility] the resumption of care home health order. Jolly Whitaker, MSW, LCSW Clinical  [place] [id number]

## 2020-04-24 NOTE — PROGRESS NOTES
[facility] VAD/Heart Failure Progress Note Admit Date: 2020 POD:  * No surgery found * Procedure:      
 
 Subjective:  
Mild fatigue and weakness; fever better; home soon Objective:  
Vitals: 
Blood pressure 126/84, pulse (!) 107, temperature 98.1 °F (36.7 °C), resp. rate 20, height 6' 2\" (1.88 m), weight 181 lb 10.5 oz (82.4 kg), SpO2 98 %. Temp (24hrs), Av.4 °F (36.9 °C), Min:98.1 °F (36.7 °C), Max:98.6 °F (37 °C) Hemodynamics: 
 CO:   
 CI:   
 CVP:   
 SVR:   
 PAP Systolic:   
 PAP Diastolic:   
 PVR:   
 OG23:   
 SCV02:   
 
VAD Interrogation: LVAD (Heartmate) Pump Speed (RPM): 5800 Pump Flow (LPM): 5.7 PI (Pulsitility Index): 2.1 Power: 4.5 MAP: 78  Test: Yes 
Back Up  at Bedside & Labeled: Yes Power Module Test: Yes Driveline Site Care: No 
Driveline Dressing: Clean, Dry, and Intact EKG/Rhythm:   
 
Extubation Date / Time:  
 
CT Output:  
 
Ventilator: 
Ventilator Volumes Vt Spont (ml): 855 ml (20) Ve Observed (l/min): 13.2 l/min (20) Oxygen Therapy: 
Oxygen Therapy O2 Sat (%): 98 % (20) Pulse via Oximetry: 79 beats per minute (20) O2 Device: Room air (20) O2 Flow Rate (L/min): 4 l/min (20) FIO2 (%): 28 % (20) CXR: 
 
Admission Weight: Last Weight Weight: 180 lb (81.6 kg) Weight: 181 lb 10.5 oz (82.4 kg) Intake / Output / Drain: 
Current Shift:  0701 -  1900 In: 360 [P.O.:360] Out: 50 [Urine:50] Last 24 hrs.:  
 
Intake/Output Summary (Last 24 hours) at 2020 1010 Last data filed at 2020 2897 Gross per 24 hour Intake 1360 ml Output 1625 ml Net -265 ml No results for input(s): CPK, CKMB, TROIQ in the last 72 hours. Recent Labs  
  20 
0443 20 
0320 20 
6734 * 135* 134* K 4.6 4.7 4.4  
CO2 27 27 27 BUN 24* 26* 28* CREA 0.97 0.99 1.08  
GLU 94 103* 96  
 MG 2.3 2.2 2.1 WBC 5.0 4.6 5.1 HGB 9.4* 9.3* 9.6* HCT 29.7* 29.8* 31.0*  
 158 152 Recent Labs  
  04/24/20 
0443 04/23/20 
0320 04/22/20 
1042 INR 1.6* 1.8* 2.0*  
PTP 16.0* 17.8* 19.8* No lab exists for component: PBNP Current Facility-Administered Medications:  
  digoxin (LANOXIN) tablet 0.125 mg, 0.125 mg, Oral, DAILY, Polliard, Harris Sly T, NP, 0.125 mg at 04/24/20 0827 
  guar gum (BENEFIBER) packet 1 Packet, 4 g, Oral, TID, Polliard, Shae Gloria, NP, 1 Packet at 04/24/20 0825 
  albuterol-ipratropium (DUO-NEB) 2.5 MG-0.5 MG/3 ML, 3 mL, Nebulization, Q6H PRN, Levi, Shana B, NP 
  amoxicillin-clavulanate (AUGMENTIN) 875-125 mg per tablet 1 Tab, 1 Tab, Oral, BID, Levi, Shana B, NP, 1 Tab at 04/24/20 0827 
  aspirin delayed-release tablet 81 mg, 81 mg, Oral, DAILY, Levi, Shana B, NP, 81 mg at 04/24/20 0827 
  atorvastatin (LIPITOR) tablet 40 mg, 40 mg, Oral, DAILY, Levi, Shana B, NP, 40 mg at 04/24/20 6074   cholecalciferol (VITAMIN D3) (1000 Units /25 mcg) tablet 2 Tab, 2,000 Units, Oral, DAILY, Levi, Shana B, NP, 2 Tab at 04/24/20 0792   clonazePAM (KlonoPIN) tablet 0.25 mg, 0.25 mg, Oral, BID, Levi, Shana B, NP, 0.25 mg at 04/24/20 3103   finasteride (PROSCAR) tablet 5 mg, 5 mg, Oral, DAILY, Levi, Shana B, NP, 5 mg at 04/24/20 0827 
  gentamicin (GARAMYCIN) 0.1 % cream, , Topical, DAILY, Levi, Shana B, NP 
  lactobac ac& pc-s.therm-b.anim (TIAN Q/RISAQUAD), 1 Cap, Oral, DAILY, Levi, Shana B, NP, 1 Cap at 04/24/20 8214   levETIRAcetam (KEPPRA) tablet 250 mg, 250 mg, Oral, Q12H, Levi, Shana B, NP, 250 mg at 04/24/20 0827 
  magnesium oxide (MAG-OX) tablet 800 mg, 800 mg, Oral, BID, Levi, Shana B, NP, 800 mg at 04/24/20 0827 
  sacubitriL-valsartan (ENTRESTO) 24-26 mg tablet 1 Tab, 1 Tab, Oral, BID, Levi, Shana B, NP, 1 Tab at 04/24/20 3966   tamsulosin (FLOMAX) capsule 0.8 mg, 0.8 mg, Oral, QHS, Levi, Shana B, NP, 0.8 mg at 04/23/20 2141   therapeutic multivitamin (THERAGRAN) tablet 1 Tab, 1 Tab, Oral, DAILY, Levi, Shana B, NP, 1 Tab at 04/24/20 0827 
  venlafaxine-SR (EFFEXOR-XR) capsule 150 mg, 150 mg, Oral, DAILY WITH BREAKFAST, Levi, Shana B, NP, 150 mg at 04/24/20 0827 
  sodium chloride (NS) flush 5-40 mL, 5-40 mL, IntraVENous, Q8H, Levi, Shana B, NP, 10 mL at 04/24/20 0522   sodium chloride (NS) flush 5-40 mL, 5-40 mL, IntraVENous, PRN, Levi, Shana B, NP 
  acetaminophen (TYLENOL) tablet 650 mg, 650 mg, Oral, Q4H PRN, Levi, Shana B, NP, 650 mg at 04/22/20 2347   oxyCODONE-acetaminophen (PERCOCET) 5-325 mg per tablet 1 Tab, 1 Tab, Oral, Q4H PRN, Levi, Shana B, NP 
  naloxone (NARCAN) injection 0.4 mg, 0.4 mg, IntraVENous, PRN, Levi, Shana B, NP 
  ondansetron (ZOFRAN) injection 4 mg, 4 mg, IntraVENous, Q4H PRN, Levi, Shana B, NP 
  hydrALAZINE (APRESOLINE) 20 mg/mL injection 10 mg, 10 mg, IntraVENous, Q4H PRN, Levi, Shana B, NP 
  hydrALAZINE (APRESOLINE) 20 mg/mL injection 20 mg, 20 mg, IntraVENous, Q4H PRN, Levi, Shana B, NP 
  meropenem (MERREM) 500 mg in 0.9% sodium chloride (MBP/ADV) 50 mL, 0.5 g, IntraVENous, Q6H, Levi, Shana B, NP, Last Rate: 100 mL/hr at 04/24/20 0822, 500 mg at 04/24/20 6578   Warfarin NP Dosing, , Other, PRN, Aaron Britton MD 
 
A/P  
  
S/P LVAD - good flows Need for anti-coagulation - coumadin Driveline infection - abx's COPD - nebs 
  
Risk of morbidity and mortality - high Medical decision making - high complexity 
  
 
Signed By: July Garrett MD

## 2020-04-24 NOTE — PROGRESS NOTES
Problem: Mobility Impaired (Adult and Pediatric) Goal: *Acute Goals and Plan of Care (Insert Text) Description: FUNCTIONAL STATUS PRIOR TO ADMISSION: Patient was modified independent using a rolling walker for functional mobility. HOME SUPPORT PRIOR TO ADMISSION: The patient lived with spouse but did not require assist. 
 
Physical Therapy Goals Initiated 4/21/2020 1. Patient will move from supine to sit and sit to supine  in bed with independence within 7 day(s). 2.  Patient will transfer from bed to chair and chair to bed with modified independence using the least restrictive device within 7 day(s). 3.  Patient will perform sit to stand with modified independence within 7 day(s). 4.  Patient will ambulate with modified independence for 150 feet with the least restrictive device within 7 day(s). 5.  Patient will ascend/descend 2 stairs with 1 handrail(s) with modified independence within 7 day(s). 4/24/2020 1141 by Acacia Paul PTA Outcome: Progressing Towards Goal 
 
PHYSICAL THERAPY TREATMENT Patient: Oh Pierson (75 y.o. male) Date: 4/24/2020 Diagnosis: Sepsis ([facility] [address] [A41.9] <principal problem not specified> Precautions: Fall Chart, physical therapy assessment, plan of care and goals were reviewed. ASSESSMENT Patient continues with skilled PT services and is progressing towards goals. Followed up per pt request. Pt eager to attempt gait again. Pt initial 20 feet reports felling \"Off\" but improved with distance. Pt still unsteady but better than AM gait. Pt reports having good/bad days at home. Will continue to progress as able. Pt has two rolling walker in room that will go home with him. Current Level of Function Impacting Discharge (mobility/balance): Tirso Other factors to consider for discharge: decrease balance and activity tolerance PLAN : 
Patient continues to benefit from skilled intervention to address the above impairments. Continue treatment per established plan of care. to address goals. Recommendation for discharge: (in order for the patient to meet his/her long term goals) Physical therapy at least 2 days/week in the home AND ensure assist and/or supervision for safety with functional mobility as needed This discharge recommendation: 
Has not yet been discussed the attending provider and/or case management IF patient discharges home will need the following DME: patient owns DME required for discharge SUBJECTIVE:  
Patient stated Samuel Mancera was better.  OBJECTIVE DATA SUMMARY:  
Critical Behavior: 
Neurologic State: Alert Orientation Level: Oriented X4 Cognition: Appropriate decision making, Appropriate for age attention/concentration, Appropriate safety awareness, Follows commands Safety/Judgement: Awareness of environment, Decreased awareness of need for assistance, Decreased awareness of need for safety, Fall prevention, Insight into deficits Functional Mobility Training: 
Bed Mobility: 
  
  
  
  
  
  
Transfers: 
Sit to Stand: Contact guard assistance Stand to Sit: Contact guard assistance Balance: 
Sitting: Intact Standing: Impaired Standing - Static: Fair Standing - Dynamic : Poor Ambulation/Gait Training: 
Distance (ft): 160 Feet (ft) Assistive Device: Gait belt;Walker, rolling Ambulation - Level of Assistance: Contact guard assistance;Minimal assistance Gait Abnormalities: Decreased step clearance; Path deviations Base of Support: Center of gravity altered Speed/Jacqueline: Pace decreased (<100 feet/min) Step Length: Left shortened;Right shortened Stairs: Therapeutic Exercises:  
 
Pain Rating: 
none Activity Tolerance:  
Limited Please refer to the flowsheet for vital signs taken during this treatment. After treatment patient left in no apparent distress: Sitting in chair and Call bell within reach COMMUNICATION/COLLABORATION:  
The patients plan of care was discussed with: Registered nurse. Ledy Vazquez PTA Time Calculation: 17 mins

## 2020-04-24 NOTE — DISCHARGE INSTRUCTIONS
Coumadin Instructions:     Please take 5 mg of warfarin (Coumadin) tonight. Your Home Health Nurse will draw your bloodwork tomorrow, and we will call you with further instructions. Patient Education        Urinary Tract Infections in Men: Care Instructions  Your Care Instructions    A urinary tract infection, or UTI, is a general term for an infection anywhere between the kidneys and the tip of the penis. UTIs can also be a result of a prostate problem. Most cause pain or burning when you urinate. Most UTIs are caused by bacteria and can be cured with antibiotics. It is important to complete your treatment so that the infection does not get worse. Follow-up care is a key part of your treatment and safety. Be sure to make and go to all appointments, and call your doctor if you are having problems. It's also a good idea to know your test results and keep a list of the medicines you take. How can you care for yourself at home? · Take your antibiotics as prescribed. Do not stop taking them just because you feel better. You need to take the full course of antibiotics. · Take your medicines exactly as prescribed. Your doctor may have prescribed a medicine, such as phenazopyridine (Pyridium), to help relieve pain when you urinate. This turns your urine orange. You may stop taking it when your symptoms get better. But be sure to take all of your antibiotics, which treat the infection. · Drink extra water for the next day or two. This will help make the urine less concentrated and help wash out the bacteria causing the infection. (If you have kidney, heart, or liver disease and have to limit your fluids, talk with your doctor before you increase your fluid intake.)  · Avoid drinks that are carbonated or have caffeine. They can irritate the bladder. · Urinate often. Try to empty your bladder each time. · To relieve pain, take a hot bath or lay a heating pad (set on low) over your lower belly or genital area. Never go to sleep with a heating pad in place. To help prevent UTIs  · Drink plenty of fluids, enough so that your urine is light yellow or clear like water. If you have kidney, heart, or liver disease and have to limit fluids, talk with your doctor before you increase the amount of fluids you drink. · Urinate when you have the urge. Do not hold your urine for a long time. Urinate before you go to sleep. · Keep your penis clean. Catheter care  If you have a drainage tube (catheter) in place, the following steps will help you care for it. · Always wash your hands before and after touching your catheter. · Check the area around the urethra for inflammation or signs of infection. Signs of infection include irritated, swollen, red, or tender skin, or pus around the catheter. · Clean the area around the catheter with soap and water two times a day. Dry with a clean towel afterward. · Do not apply powder or lotion to the skin around the catheter. To empty the urine collection bag  · Wash your hands with soap and water. · Without touching the drain spout, remove the spout from its sleeve at the bottom of the collection bag. Open the valve on the spout. · Let the urine flow out of the bag and into the toilet or a container. Do not let the tubing or drain spout touch anything. · After you empty the bag, clean the end of the drain spout with tissue and water. Close the valve and put the drain spout back into its sleeve at the bottom of the collection bag. · Wash your hands with soap and water. When should you call for help? Call your doctor now or seek immediate medical care if:    · Symptoms such as a fever, chills, nausea, or vomiting get worse or happen for the first time.     · You have new pain in your back just below your rib cage.  This is called flank pain.     · There is new blood or pus in your urine.     · You are not able to take or keep down your antibiotics.    Watch closely for changes in your health, and be sure to contact your doctor if:    · You are not getting better after taking an antibiotic for 2 days.     · Your symptoms go away but then come back. Where can you learn more? Go to http://karoline-arash.info/  Enter R149 in the search box to learn more about \"Urinary Tract Infections in Men: Care Instructions. \"  Current as of: August 21, 2019Content Version: 12.4  © 7597-9916 [employer], [employer]. Care instructions adapted under license by [employer] (which disclaims liability or warranty for this information). If you have questions about a medical condition or this instruction, always ask your healthcare professional. [address] any warranty or liability for your use of this information.

## 2020-04-24 NOTE — PROGRESS NOTES
Attempted to schedule hospital follow up PCP appointment. Office presently closed. Left voicemail message with call back information.   Tom Talbot, Care Management Specialist.

## 2020-04-24 NOTE — PROGRESS NOTES
[facility] VAD/Heart Failure Progress Note Admit Date: 2020 POD:  * No surgery found * Procedure:      
 
 Subjective:  
Mild fatigue and weakness; fever improved; denies chest pain Objective:  
Vitals: 
Blood pressure 126/84, pulse (!) 107, temperature 98.1 °F (36.7 °C), resp. rate 20, height 6' 2\" (1.88 m), weight 181 lb 10.5 oz (82.4 kg), SpO2 98 %. Temp (24hrs), Av.4 °F (36.9 °C), Min:98.1 °F (36.7 °C), Max:98.6 °F (37 °C) Hemodynamics: 
 CO:   
 CI:   
 CVP:   
 SVR:   
 PAP Systolic:   
 PAP Diastolic:   
 PVR:   
 HC45:   
 SCV02:   
 
VAD Interrogation: LVAD (Heartmate) Pump Speed (RPM): 5800 Pump Flow (LPM): 5.7 PI (Pulsitility Index): 2.1 Power: 4.5 MAP: 78  Test: Yes 
Back Up  at Bedside & Labeled: Yes Power Module Test: Yes Driveline Site Care: No 
Driveline Dressing: Clean, Dry, and Intact EKG/Rhythm:   
 
Extubation Date / Time:  
 
CT Output:  
 
Ventilator: 
Ventilator Volumes Vt Spont (ml): 855 ml (20) Ve Observed (l/min): 13.2 l/min (20) Oxygen Therapy: 
Oxygen Therapy O2 Sat (%): 98 % (20) Pulse via Oximetry: 79 beats per minute (20) O2 Device: Room air (20) O2 Flow Rate (L/min): 4 l/min (20) FIO2 (%): 28 % (20) CXR: 
 
Admission Weight: Last Weight Weight: 180 lb (81.6 kg) Weight: 181 lb 10.5 oz (82.4 kg) Intake / Output / Drain: 
Current Shift:  0701 -  1900 In: 360 [P.O.:360] Out: 50 [Urine:50] Last 24 hrs.:  
 
Intake/Output Summary (Last 24 hours) at 2020 1009 Last data filed at 2020 3755 Gross per 24 hour Intake 1360 ml Output 1625 ml Net -265 ml No results for input(s): CPK, CKMB, TROIQ in the last 72 hours. Recent Labs  
  20 
0443 20 
0320 20 
5735 * 135* 134* K 4.6 4.7 4.4  
CO2 27 27 27 BUN 24* 26* 28* CREA 0.97 0.99 1.08  
 GLU 94 103* 96 MG 2.3 2.2 2.1 WBC 5.0 4.6 5.1 HGB 9.4* 9.3* 9.6* HCT 29.7* 29.8* 31.0*  
 158 152 Recent Labs  
  04/24/20 
0443 04/23/20 
0320 04/22/20 
2452 INR 1.6* 1.8* 2.0*  
PTP 16.0* 17.8* 19.8* No lab exists for component: PBNP Current Facility-Administered Medications:  
  digoxin (LANOXIN) tablet 0.125 mg, 0.125 mg, Oral, DAILY, Polliard, Jericho Setter T, NP, 0.125 mg at 04/24/20 0827 
  guar gum (BENEFIBER) packet 1 Packet, 4 g, Oral, TID, Polliard, Lillia Gretta, NP, 1 Packet at 04/24/20 0825 
  albuterol-ipratropium (DUO-NEB) 2.5 MG-0.5 MG/3 ML, 3 mL, Nebulization, Q6H PRN, Levi, Shana B, NP 
  amoxicillin-clavulanate (AUGMENTIN) 875-125 mg per tablet 1 Tab, 1 Tab, Oral, BID, Levi, Shana B, NP, 1 Tab at 04/24/20 0827 
  aspirin delayed-release tablet 81 mg, 81 mg, Oral, DAILY, Levi, Shana B, NP, 81 mg at 04/24/20 0827 
  atorvastatin (LIPITOR) tablet 40 mg, 40 mg, Oral, DAILY, Levi, Shana B, NP, 40 mg at 04/24/20 8056   cholecalciferol (VITAMIN D3) (1000 Units /25 mcg) tablet 2 Tab, 2,000 Units, Oral, DAILY, Levi, Shana B, NP, 2 Tab at 04/24/20 2819   clonazePAM (KlonoPIN) tablet 0.25 mg, 0.25 mg, Oral, BID, Levi, Shana B, NP, 0.25 mg at 04/24/20 8207   finasteride (PROSCAR) tablet 5 mg, 5 mg, Oral, DAILY, Levi, Shana B, NP, 5 mg at 04/24/20 0827 
  gentamicin (GARAMYCIN) 0.1 % cream, , Topical, DAILY, Levi, Shana B, NP 
  lactobac ac& pc-s.therm-b.anim (TIAN Q/RISAQUAD), 1 Cap, Oral, DAILY, Levi, Shana B, NP, 1 Cap at 04/24/20 0612   levETIRAcetam (KEPPRA) tablet 250 mg, 250 mg, Oral, Q12H, Levi, Shana B, NP, 250 mg at 04/24/20 0827 
  magnesium oxide (MAG-OX) tablet 800 mg, 800 mg, Oral, BID, Levi, Shana B, NP, 800 mg at 04/24/20 0827 
  sacubitriL-valsartan (ENTRESTO) 24-26 mg tablet 1 Tab, 1 Tab, Oral, BID, Levi, Shana B, NP, 1 Tab at 04/24/20 2920   tamsulosin (FLOMAX) capsule 0.8 mg, 0.8 mg, Oral, QHS, Levi, Shana B, NP, 0.8 mg at 04/23/20 2141   therapeutic multivitamin (THERAGRAN) tablet 1 Tab, 1 Tab, Oral, DAILY, Levi, Shana B, NP, 1 Tab at 04/24/20 0827 
  venlafaxine-SR (EFFEXOR-XR) capsule 150 mg, 150 mg, Oral, DAILY WITH BREAKFAST, Levi, Shana B, NP, 150 mg at 04/24/20 0827 
  sodium chloride (NS) flush 5-40 mL, 5-40 mL, IntraVENous, Q8H, Levi, Shana B, NP, 10 mL at 04/24/20 0522   sodium chloride (NS) flush 5-40 mL, 5-40 mL, IntraVENous, PRN, Levi, Shana B, NP 
  acetaminophen (TYLENOL) tablet 650 mg, 650 mg, Oral, Q4H PRN, Levi, Shana B, NP, 650 mg at 04/22/20 2347   oxyCODONE-acetaminophen (PERCOCET) 5-325 mg per tablet 1 Tab, 1 Tab, Oral, Q4H PRN, Levi, Shana B, NP 
  naloxone (NARCAN) injection 0.4 mg, 0.4 mg, IntraVENous, PRN, Levi, Shana B, NP 
  ondansetron (ZOFRAN) injection 4 mg, 4 mg, IntraVENous, Q4H PRN, Levi, Shana B, NP 
  hydrALAZINE (APRESOLINE) 20 mg/mL injection 10 mg, 10 mg, IntraVENous, Q4H PRN, Levi, Shana B, NP 
  hydrALAZINE (APRESOLINE) 20 mg/mL injection 20 mg, 20 mg, IntraVENous, Q4H PRN, Levi, Shana B, NP 
  meropenem (MERREM) 500 mg in 0.9% sodium chloride (MBP/ADV) 50 mL, 0.5 g, IntraVENous, Q6H, Levi, Shana B, NP, Last Rate: 100 mL/hr at 04/24/20 0822, 500 mg at 04/24/20 1068   Warfarin NP Dosing, , Other, PRN, Tatyana Wakefield MD 
 
A/P  
  
S/P LVAD - good flows Need for anti-coagulation - coumadin Driveline infection - abx's COPD - nebs 
  
Risk of morbidity and mortality - high Medical decision making - high complexity 
  
 
Signed By: Cathryn Gunn MD

## 2020-04-24 NOTE — PROGRESS NOTES
Transitions of Care: 24% risk of re-admission  
 
 -The patient will discharge home today with resumption of home health services through [facility]  
 -Family to transport The CM spoke with [facility] LCSW- patient will discharge home today, will discharge on PO antibiotics. The CM called [facility]- spoke with intake, CM also informed Davis Hinds, that patient will be discharging home today with a [facility] for tomorrow, 4/25. The patient's family will transport home. The CM will follow for additional of care needs. CM faxed resumption of care orders directly to [facility] (f: [phone]).  Prema Lockhart, SUSHIL

## 2020-04-24 NOTE — PROGRESS NOTES
Problem: Mobility Impaired (Adult and Pediatric) Goal: *Acute Goals and Plan of Care (Insert Text) Description: FUNCTIONAL STATUS PRIOR TO ADMISSION: Patient was modified independent using a rolling walker for functional mobility. HOME SUPPORT PRIOR TO ADMISSION: The patient lived with spouse but did not require assist. 
 
Physical Therapy Goals Initiated 4/21/2020 1. Patient will move from supine to sit and sit to supine  in bed with independence within 7 day(s). 2.  Patient will transfer from bed to chair and chair to bed with modified independence using the least restrictive device within 7 day(s). 3.  Patient will perform sit to stand with modified independence within 7 day(s). 4.  Patient will ambulate with modified independence for 150 feet with the least restrictive device within 7 day(s). 5.  Patient will ascend/descend 2 stairs with 1 handrail(s) with modified independence within 7 day(s). Outcome: Progressing Towards Goal 
 
PHYSICAL THERAPY TREATMENT Patient: Oh Pierson (75 y.o. male) Date: 4/24/2020 Diagnosis: Sepsis ([facility] [address] [A41.9] <principal problem not specified> Precautions: Fall Chart, physical therapy assessment, plan of care and goals were reviewed. ASSESSMENT Patient continues with skilled PT services and is progressing towards goals. Pt was able to slowly process through LVAD change over with out v.c. pt had decrease balance with gait requiring increase assistance. Pt was positive for LOB requiring increase assistance to ambulate. Pt expressed feeling \"off\"  RN notified. Pt MAP 88 will continue to progress. Current Level of Function Impacting Discharge (mobility/balance): min/mod A Other factors to consider for discharge: decrease activity tolerance, decrease balance PLAN : 
Patient continues to benefit from skilled intervention to address the above impairments. Continue treatment per established plan of care. to address goals. Recommendation for discharge: (in order for the patient to meet his/her long term goals) Physical therapy at least 2 days/week in the home AND ensure assist and/or supervision for safety with functional mobility This discharge recommendation: 
Has not yet been discussed the attending provider and/or case management IF patient discharges home will need the following DME: patient owns DME required for discharge SUBJECTIVE:  
Patient stated I didn't walk well today.  OBJECTIVE DATA SUMMARY:  
Critical Behavior: 
Neurologic State: Alert Orientation Level: Oriented X4 Cognition: Appropriate decision making, Appropriate for age attention/concentration, Appropriate safety awareness, Follows commands Safety/Judgement: Awareness of environment, Decreased awareness of need for assistance, Decreased awareness of need for safety, Fall prevention, Insight into deficits Functional Mobility Training: 
Bed Mobility: 
  
  
  
  
  
  
Transfers: 
Sit to Stand: Contact guard assistance Stand to Sit: Contact guard assistance Balance: 
Sitting: Intact Standing: Impaired Standing - Static: Fair Standing - Dynamic : Poor Ambulation/Gait Training: 
Distance (ft): 120 Feet (ft) Assistive Device: Gait belt;Walker, rolling Ambulation - Level of Assistance: Minimal assistance; Moderate assistance Gait Abnormalities: Decreased step clearance; Path deviations Base of Support: Center of gravity altered Speed/Jacqueline: Pace decreased (<100 feet/min) Step Length: Left shortened;Right shortened Stairs: Therapeutic Exercises:  
 
Pain Rating: No complaints Activity Tolerance:  
Limited Please refer to the flowsheet for vital signs taken during this treatment. After treatment patient left in no apparent distress:  
Sitting in chair and Call bell within reach COMMUNICATION/COLLABORATION:  
 The patients plan of care was discussed with: Registered nurse. Adamaris Esteban PTA Time Calculation: 25 mins

## 2020-04-24 NOTE — PROGRESS NOTES
1930: Bedside and Verbal shift change report given to [address], Idalia Rodriguez RN (oncoming nurse) by Inderjit Darby (offgoing nurse). Report included the following information SBAR, Kardex, Intake/Output, Recent Results, Med Rec Status and Cardiac Rhythm Paced AFib. 2130: CPAP applied to patient 0730: Bedside and Verbal shift change report given to [id number] [place] [address], Rizwana Magallanes RN (oncoming nurse) by Dixei Mcknight RN (offgoing nurse). Report included the following information SBAR, Kardex, Intake/Output, Recent Results, Med Rec Status and Cardiac Rhythm Paced AFib.

## 2020-04-24 NOTE — ROUTINE PROCESS
1311: Paged Garcia to interrogate pacemaker. 1320: [employer] rep to call MD regarding interrogation and orders. 1350: Wound and blood cultures sent.

## 2020-04-24 NOTE — PROGRESS NOTES
0730: Bedside shift change report given to Elias (oncoming nurse) by Von Khan (offgoing nurse). Report included the following information SBAR, Kardex, [facility], Recent Results and Cardiac Rhythm Paced. 1500: I have reviewed discharge instructions with the patient. The patient verbalized understanding. Problem: Falls - Risk of 
Goal: *Absence of Falls Description: Document Hernando Mcknight Fall Risk and appropriate interventions in the flowsheet. Outcome: Progressing Towards Goal 
Note: Fall Risk Interventions: 
Mobility Interventions: OT consult for ADLs, Patient to call before getting OOB, PT Consult for mobility concerns, PT Consult for assist device competence Medication Interventions: Patient to call before getting OOB, Teach patient to arise slowly Elimination Interventions: Call light in reach, Patient to call for help with toileting needs, Urinal in reach History of Falls Interventions: Investigate reason for fall Problem: Patient Education: Go to Patient Education Activity Goal: Patient/Family Education Outcome: Progressing Towards Goal 
  
Problem: Pressure Injury - Risk of 
Goal: *Prevention of pressure injury Description: Document Mich Scale and appropriate interventions in the flowsheet. Outcome: Progressing Towards Goal 
Note: Pressure Injury Interventions: 
Sensory Interventions: Assess changes in LOC, Maintain/enhance activity level, Minimize linen layers, Keep linens dry and wrinkle-free Moisture Interventions: Absorbent underpads, Minimize layers Activity Interventions: Increase time out of bed, Pressure redistribution bed/mattress(bed type), PT/OT evaluation Mobility Interventions: Pressure redistribution bed/mattress (bed type), PT/OT evaluation Nutrition Interventions: Document food/fluid/supplement intake, Offer support with meals,snacks and hydration Friction and Shear Interventions: Lift sheet, Minimize layers Problem: Patient Education: Go to Patient Education Activity Goal: Patient/Family Education Outcome: Progressing Towards Goal 
  
Problem: Urinary Tract Infection - Adult Goal: *Absence of infection signs and symptoms Outcome: Progressing Towards Goal 
  
Problem: Patient Education: Go to Patient Education Activity Goal: Patient/Family Education Outcome: Progressing Towards Goal

## 2020-04-24 NOTE — PROGRESS NOTES
Urology Progress Note Subjective:  
 
Daily Progress Note: 2020 8:05 AM 
 
Sona Kiser is doing good. He reports pain is absent. He has no new complaints. He is tolerating a solid diet. Patient is voiding without difficulty. He reports his frequency is slowly improving and no dysuria. He would like to avoid a catheter. Objective:  
 
Visit Vitals /84 Pulse 100 Temp 98.6 °F (37 °C) Resp 18 Ht 6' 2\" (1.88 m) Wt 82.4 kg (181 lb 10.5 oz) SpO2 100% BMI 23.32 kg/m² Temp (24hrs), Av.3 °F (36.8 °C), Min:98 °F (36.7 °C), Max:98.6 °F (37 °C) Intake and Output: 
 1901 -  0700 In: 2548 [P.O.:900; I.V.:850] Out: 8140 [Urine:2725] No intake/output data recorded. Physical Exam:  
General appearance: alert, cooperative, no distress, appears stated age Lab/Data Review: 
BMP:  
Lab Results Component Value Date/Time  (L) 2020 04:43 AM  
 K 4.6 2020 04:43 AM  
 CL 99 2020 04:43 AM  
 CO2 27 2020 04:43 AM  
 AGAP 5 2020 04:43 AM  
 GLU 94 2020 04:43 AM  
 BUN 24 (H) 2020 04:43 AM  
 CREA 0.97 2020 04:43 AM  
 GFRAA >60 2020 04:43 AM  
 GFRNA >60 2020 04:43 AM  
 
CBC:  
Lab Results Component Value Date/Time WBC 5.0 2020 04:43 AM  
 HGB 9.4 (L) 2020 04:43 AM  
 HCT 29.7 (L) 2020 04:43 AM  
  2020 04:43 AM  
 
 
Assessment/Plan: Active Problems: 
  Sepsis ([address] (2020) Incomplete emptying/UTI/Hematuria Plan:  Doing well and continue with treatment Continue flomax and finasteride. Trying to avoid catheterization. Cx still pending. Treat based on results.

## 2020-04-24 NOTE — PROGRESS NOTES
Patient confirmed wife will be here to pick him up upon discharge. Medicare pt has received, reviewed, and signed 2nd IM letter informing them of their right to appeal the discharge. Signed copy has been placed on pt bedside chart.  
 
Devin Rios, CRM

## 2020-04-24 NOTE — PROGRESS NOTES
Problem: Falls - Risk of 
Goal: *Absence of Falls Description: Document Hermilo Holden Fall Risk and appropriate interventions in the flowsheet. 4/24/2020 1552 by Cipriano Farah RN Outcome: Resolved/Met Note: Fall Risk Interventions: 
Mobility Interventions: OT consult for ADLs, Patient to call before getting OOB, PT Consult for mobility concerns, PT Consult for assist device competence Medication Interventions: Patient to call before getting OOB, Teach patient to arise slowly Elimination Interventions: Call light in reach, Patient to call for help with toileting needs, Urinal in reach History of Falls Interventions: Investigate reason for fall 4/24/2020 0937 by Cipriano Farah RN Outcome: Progressing Towards Goal 
Note: Fall Risk Interventions: 
Mobility Interventions: OT consult for ADLs, Patient to call before getting OOB, PT Consult for mobility concerns, PT Consult for assist device competence Medication Interventions: Patient to call before getting OOB, Teach patient to arise slowly Elimination Interventions: Call light in reach, Patient to call for help with toileting needs, Urinal in reach History of Falls Interventions: Investigate reason for fall Problem: Patient Education: Go to Patient Education Activity Goal: Patient/Family Education 4/24/2020 1552 by Cipriano Farah RN Outcome: Resolved/Met 
4/24/2020 0937 by Cipriano Farah RN Outcome: Progressing Towards Goal 
  
Problem: Pressure Injury - Risk of 
Goal: *Prevention of pressure injury Description: Document Mich Scale and appropriate interventions in the flowsheet. 4/24/2020 1552 by Cipriano Farah RN Outcome: Resolved/Met Note: Pressure Injury Interventions: 
Sensory Interventions: Assess changes in LOC, Maintain/enhance activity level, Minimize linen layers, Keep linens dry and wrinkle-free Moisture Interventions: Absorbent underpads, Minimize layers Activity Interventions: Increase time out of bed, Pressure redistribution bed/mattress(bed type), PT/OT evaluation Mobility Interventions: Pressure redistribution bed/mattress (bed type), PT/OT evaluation Nutrition Interventions: Document food/fluid/supplement intake, Offer support with meals,snacks and hydration Friction and Shear Interventions: Lift sheet, Minimize layers 4/24/2020 0937 by Rosario Vaughn RN Outcome: Progressing Towards Goal 
Note: Pressure Injury Interventions: 
Sensory Interventions: Assess changes in LOC, Maintain/enhance activity level, Minimize linen layers, Keep linens dry and wrinkle-free Moisture Interventions: Absorbent underpads, Minimize layers Activity Interventions: Increase time out of bed, Pressure redistribution bed/mattress(bed type), PT/OT evaluation Mobility Interventions: Pressure redistribution bed/mattress (bed type), PT/OT evaluation Nutrition Interventions: Document food/fluid/supplement intake, Offer support with meals,snacks and hydration Friction and Shear Interventions: Lift sheet, Minimize layers Problem: Patient Education: Go to Patient Education Activity Goal: Patient/Family Education 4/24/2020 1552 by Rosario Vaughn RN Outcome: Resolved/Met 
4/24/2020 0937 by Rosario Vaughn RN Outcome: Progressing Towards Goal 
  
Problem: Urinary Tract Infection - Adult Goal: *Absence of infection signs and symptoms 4/24/2020 1552 by Rosario Vaughn RN Outcome: Resolved/Met 
4/24/2020 0937 by Rosario Vaughn RN Outcome: Progressing Towards Goal 
  
Problem: Patient Education: Go to Patient Education Activity Goal: Patient/Family Education 4/24/2020 1552 by Rosario Vaughn RN Outcome: Resolved/Met 
4/24/2020 0937 by Rosario Vaughn RN Outcome: Progressing Towards Goal 
  
Problem: Patient Education: Go to Patient Education Activity Goal: Patient/Family Education Outcome: Resolved/Met Problem: Patient Education: Go to Patient Education Activity Goal: Patient/Family Education Outcome: Resolved/Met

## 2020-04-25 NOTE — PROGRESS NOTES
Patient contacted regarding recent discharge and COVID-19 risk Care Transition Nurse/ Ambulatory Care Manager contacted the patient' spouse Lillian Kerr by telephone to perform post discharge assessment. Verified name and  with family as identifiers. Patient has following risk factors of: heart failure. CTN/ACM reviewed discharge instructions, medical action plan and red flags related to discharge diagnosis. Reviewed and educated them on any new and changed medications related to discharge diagnosis. Advised obtaining a 90-day supply of all daily and as-needed medications. Education provided regarding infection prevention, and signs and symptoms of COVID-19 and when to seek medical attention with family who verbalized understanding. Discussed exposure protocols and quarantine from [address] you at higher risk for severe illness  and given an opportunity for questions and concerns. The family agrees to contact the COVID-19 hotline [phone] or PCP office for questions related to their healthcare. CTN/ACM provided contact information for future reference. From CDC: Are you at higher risk for severe illness?  Wash your hands often.  Avoid close contact (6 feet, which is about two arm lengths) with people who are sick.  Put distance between yourself and other people if COVID-19 is spreading in your community.  Clean and disinfect frequently touched surfaces.  Avoid all cruise travel and non-essential air travel.  Call your healthcare professional if you have concerns about COVID-19 and your underlying condition or if you are sick. For more information on steps you can take to protect yourself, see CDC's How to Protect Yourself Plan for follow-up call in 7-14 days based on severity of symptoms and risk factors.

## 2020-04-27 NOTE — PROGRESS NOTES
Received call back from PCP office. Hospital follow-up PCP transitional care appointment has been scheduled with Dr. Jay Qiu for Monday, 5/4/20 at 11:00 a.m. Left voice mail message with call back information.    Meryl Anguiano, Care Management Specialist.

## 2020-04-27 NOTE — TELEPHONE ENCOUNTER
PAD 9  HR 77  Weight n/a  BP n/a  Comments: Called and spoke with patient's wife. Cinthia Valles denies any LVAD Low Flow alarms. Cinthia Valles verbalized understanding to have patient increase fluid/water intake.

## 2020-04-28 NOTE — TELEPHONE ENCOUNTER
Patient contacted to schedule appt to a virtual/telephone appt. Per Paris Andrade to schedule appointment 4/29/20 at 1:25pm.  Appointment has been scheduled as a Doxy appointment. Patient requested doxy link to be sent to his home health nurse Joni Bland at [phone] for patient's appointment tomorrow. She will be present for the appointment. Patient was instructed to take their weight, BP, HR and temp before the appt. and have medications/list available. I informed the patient they would receive a call from our office 15 min before the appt. to provide readings. The following was confirmed with the patient:    \"We want to confirm that, for purposes of billing, this is a virtual visit with your provider for which we will submit a claim for reimbursement with your insurance company. You will be responsible for any co pays, coinsurance, amounts or other amounts not covered by your insurance company. If you do not accept this, unfortunately we will not be able to schedule a virtual visit with the provider. Do you accept? \"    Patient does accept. Patient verbalized understanding and had no further questions.

## 2020-04-28 NOTE — DISCHARGE SUMMARY
[facility] Discharge Summary Patient ID: 
Ayesha Vivas [id number] 
71 y.o. 
1950 Admit date: 4/20/2020 Discharge date: 4/24/2020 Admitting Physician: Kristy Crawford MD  
 
Referring Cardiologist:  Chaka Dover MD 
 
PCP:  Keegan Guerrero MD 
 
Admitting Diagnoses: sepsis Discharge Diagnoses: sepsis due to urinary tract infection Hospital Problems  Date Reviewed: 3/23/2020 Codes Class Noted POA Sepsis ([address] ICD-10-CM: A41.9 ICD-9-CM: 038.9, 995.91  4/20/2020 Unknown Discharged Condition: improved Disposition: home, see patient instructions for treatment and plan 
 
Procedures for this admission:  * No surgery found * Discharge Medications: My Medications START taking these medications Instructions Each Dose to Equal Morning Noon Evening Bedtime  
amoxicillin-clavulanate 875-125 mg per tablet Commonly known as:  AUGMENTIN Your last dose was: Your next dose is: Take 1 Tab by mouth two (2) times a day. 1 Tab 
  
  
  
  
  
ciprofloxacin HCl 750 mg tablet Commonly known as:  CIPRO Your last dose was: Your next dose is: Take 1 Tab by mouth two (2) times a day. 750 mg 
  
  
  
  
  
digoxin 0.125 mg tablet Commonly known as:  LANOXIN Your last dose was: Your next dose is: Take 1 Tab by mouth daily. 0.125 mg 
  
  
  
  
  
guar gum packet Commonly known as:  Sacha Gails Your last dose was: Your next dose is: Take 1 Packet by mouth three (3) times daily (with meals) for 90 days. 4 g CONTINUE taking these medications Instructions Each Dose to Equal Morning Noon Evening Bedtime  
albuterol 90 mcg/actuation inhaler Commonly known as:  PROVENTIL HFA, VENTOLIN HFA, PROAIR HFA Your last dose was: Your next dose is: Take 2 Puffs by inhalation every six (6) hours as needed for Wheezing. 2 Puff albuterol-ipratropium 2.5 mg-0.5 mg/3 ml Nebu Commonly known as:  [place] Prom Your last dose was: Your next dose is:   
 
  
 3 mL by Nebulization route nightly. 3 mL 
  
  
  
  
  
aspirin delayed-release 81 mg tablet Your last dose was: Your next dose is: Take 1 Tab by mouth daily. 81 mg 
  
  
  
  
  
atorvastatin 40 mg tablet Commonly known as:  LIPITOR Your last dose was: Your next dose is: Take 1 Tab by mouth daily. 40 mg 
  
  
  
  
  
budesonide 0.5 mg/2 mL Nbsp Commonly known as:  PULMICORT Your last dose was: Your next dose is:   
 
  
 500 mcg by Nebulization route every evening. 500 mcg 
  
  
  
  
  
cholecalciferol (1000 Units /25 mcg) tablet Commonly known as:  VITAMIN D3 Your last dose was: Your next dose is: Take 2 Tabs by mouth daily. 2,000 Units 
  
  
  
  
  
clonazePAM 0.5 mg tablet Commonly known as:  Partdillan Johnston Your last dose was: Your next dose is: Take 0.5 Tabs by mouth two (2) times a day. Max Daily Amount: 0.5 mg. 
 0.25 mg Entresto 24-26 mg tablet Generic drug:  sacubitriL-valsartan Your last dose was: Your next dose is: Take 1 Tab by mouth two (2) times a day. 1 Tab 
  
  
  
  
  
finasteride 5 mg tablet Commonly known as:  PROSCAR Your last dose was: Your next dose is: Take 1 Tab by mouth daily. 5 mg Flomax 0.4 mg capsule Generic drug:  tamsulosin Your last dose was: Your next dose is: Take 0.8 mg by mouth nightly. 0.8 mg 
  
  
  
  
  
gentamicin 0.1 % topical ointment Commonly known as:  GARAMYCIN Your last dose was: Your next dose is:   
 
  
 Apply to exit site daily. L. acidoph & paracasei- S therm- Bifido 8 billion cell Cap cap Commonly known as:  TIAN-Q/RISAQUAD Your last dose was: Your next dose is: Take 1 Cap by mouth daily. 1 Cap levETIRAcetam 250 mg tablet Commonly known as:  KEPPRA Your last dose was: Your next dose is: Take 1 Tab by mouth two (2) times a day. 250 mg 
  
  
  
  
  
magnesium oxide 400 mg tablet Commonly known as:  MAG-OX Your last dose was: Your next dose is: Take 2 Tabs by mouth two (2) times a day. 800 mg 
  
  
  
  
  
therapeutic multivitamin tablet Commonly known as:  [facility] Your last dose was: Your next dose is: Take 1 Tab by mouth daily. 1 Tab 
  
  
  
  
  
venlafaxine- mg capsule Commonly known as:  EFFEXOR-XR Your last dose was: Your next dose is: Take 1 Cap by mouth daily (with breakfast). 150 mg 
  
  
  
  
  
* warfarin 1 mg tablet Commonly known as:  COUMADIN Your last dose was: Your next dose is: Take 3 Tabs by mouth daily. May take additional tab as directed by provider. 3 mg * warfarin 3 mg tablet Commonly known as:  COUMADIN Your last dose was: Your next dose is: Take 1 Tab by mouth daily. May take additional tab as directed by provider. 3 mg * This list has 2 medication(s) that are the same as other medications prescribed for you. Read the directions carefully, and ask your doctor or other care provider to review them with you. STOP taking these medications   
potassium chloride SR 10 mEq tablet Commonly known as:  KLOR-CON 10 Where to Get Your Medications These medications were sent to [address] [place] [address] [address] [id number] Phone:  [phone] · digoxin 0.125 mg tablet Information on where to get these meds will be given to you by the nurse or doctor. Ask your nurse or doctor about these medications · amoxicillin-clavulanate 875-125 mg per tablet · ciprofloxacin HCl 750 mg tablet · guar gum packet INR [facility]- 1.5-2.0 INR LEVEL to be drawn- Saturday, 25th, 2020 INR management per [address] HPI:Sona Kiser is a 71y.o. year old white male with a history of HTN, HLD, JOSE, CAD s/p cardiac arrest VFib s/p CABG (2011) c/b sternal would infection and sternectomy, ischemic cardiomyopathy LVEF 15-20%, s/p BiVICD  who underwent LVAD as DT on 11/18/2019 after bridging with Impella 5.0.  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 He was transferred to [facility] rehab on 1/30 and discharged from rehab on 2/18/2020.  
  
On 4/20, Alondra Urrutia presented for a virtual LVAD follow up visit. He reported acute onset of fever as high as 100.9 and chills, associated with increased urinary frequency and dysuria with foul smelling and discolored urine. Alondra Urrutia has been treated with Cipro since February 18th for pseudomonas urosepsis and resultant bacteremia. Per ID, he was to remain on Cipro for at least 6-12 months, possibly lifelong. According to the patient and his wife, his Cipro was stopped by Urology during a recent outpatient visit. He was referred to the ED, evaluated and admitted to [facility] for further evaluation and treatment. While inpatient, he had pan cultures drawn which were significant for [facility] 2/4 positive for pseudomonas. His urine culture was negative and wound culture obtained from his drive line site was also negative. Sensitivities revealed that the pseudomonas is susceptible to Cipro; he was started back on this indefinitely and discharged home with [facility] and Urology follow up. Discharge Vital Signs:  
Visit Vitals /84 Pulse 85 Temp 97.9 °F (36.6 °C) Resp 18 Ht 6' 2\" (1.88 m) Wt 181 lb 10.5 oz (82.4 kg) SpO2 97% BMI 23.32 kg/m² Labs: No results for input(s): WBC, HGB, HCT, PLT, NA, K, BUN, CREA, GLU, GLUCPOC, INR, HGBEXT, HCTEXT, PLTEXT, INREXT in the last 72 hours. No lab exists for component: [facility]: please see EMR. Patient Instructions/Follow Up Care: 
Discharge instructions were reviewed with the patient and family present. Questions were also answered at this time. Prescriptions and medications were reviewed. The patient has a follow up appointment with the Nurse Practitioner on April 29th at 1:25 pm. The patient was also instructed to follow up with his primary care physician as needed. The patient and family were encouraged to call with any questions or concerns. Signed: 
Alejandro Hannon NP 
4/28/2020 
9:00 AM  
 
[facility] ATTENDING ADDENDUM Patient was seen and examined in person. Data and notes were reviewed. I have discussed and agree with the plan as noted in the NP note above without further additions. Bud Webster MD PhD 
Shaun Longoria 9 [facility] Discharge > 30 min

## 2020-04-28 NOTE — TELEPHONE ENCOUNTER
Spoke with patient in preparation for doxy appointment 4/29/20. They had no questions regarding doxy. Medication reconciliation reviewed with patient. Medications updated. No discrepancies noted. Patient was instructed to take their weight, BP, HR and temp before the appointment. I informed the patient they would receive a call from our office 15 min before the appointment to provide readings. Patient verbalized understanding and had no further questions. Shyann Rodríguez RN.

## 2020-04-29 NOTE — PATIENT INSTRUCTIONS
Stop taking the Augmentin but continue the gentamicin cream to the drive line site Follow up next week in clinic in person Labs on Monday Please increase your fluid intake and eat some extra salt No changes to your coumadin dosing

## 2020-04-29 NOTE — PROGRESS NOTES
[facility] [facility] Virtual Note Consent: Courtney Blanco, who was seen by synchronous (real-time) audio-video technology, and/or his healthcare decision maker, is aware that this patient-initiated, Telehealth encounter on 2020 is a billable service, with coverage as determined by his insurance carrier. He is aware that he may receive a bill and has provided verbal consent to proceed: Yes. Patient name: Courtney Blanco Patient : 1950 Patient MRN: [id number] Date of service: 20 CHIEF COMPLAINT: 
Chief Complaint Patient presents with  Cardiomyopathy  Follow-up LVAD  
 
 
HPI: Sona Kiser is a 71y.o. year old white male with a history of HTN, HLD, JOSE, CAD s/p cardiac arrest VFib s/p CABG () c/b sternal would infection and sternectomy, ischemic cardiomyopathy LVEF 15-20%, s/p BiVICD  who underwent LVAD as DT on 2019 after bridging with Impella 5.0.  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 He was transferred to [facility] rehab on  and discharged from rehab on 2020. Enoc Rousseau was seen  for an LVAD follow up visit. Over the weekend, he developed a fever as high as 100.9 and chills. This was associated with increased urinary frequency and dysuria with foul smelling and discolored urine. Enoc Rousseau had been treated with Cipro since  for pseudomonas urosepsis and resultant bacteremia. Per ID, he was to remain on Cipro for at least 6-12 months, possibly lifelong. According to the patient and his wife, his Cipro was stopped by Urology during a recent outpatient visit. He was referred to the ED, evaluated and admitted to [facility]. He remains on CVSU undergoing treatment for UTI and bacteremia. Assessment/Plan: NYHA Class II 
 
 Sepsis, suspect related to recurrent pseudomonal infection [facility] 4/20 - 2/4 bottles growing pseudomonas UA suspicious for UTI; but urine culture shows < 1,000 CFU Will check procalcitonin/ESR with next lab draw ID consult appreciated CT pelvis negative for prostate abscess ICM - Stage D,  LVEF 15%, NYHA Class IV improved to NYHA Class III s/p HM3 LVAD as DT on 11/18/19 with Impella 5.0 as bridge to LVAD Continue LVAD speed at 5800 rpm  
 TTE 4/20 shows large LVIDd, 6.84 cm, RVIDd 3.06 cm, TAPSE 1.15 cm, severely elevated CVP Continue digoxin 0.125 mg PO daily to support RV through infection Intolerant to BB d/t RV failure Continue Entresto  24/26 mg PO BID Intolerant to spironolactone due to IVVD and hyponatremia Dr. Pop Martinez would like to pull sternal wire once stable - will need to wait until active infection has resolved HTN, goal MAP 70-90 mmHg Continue Conchita Grandchild MAPs on LUE only d/t previous right axillary Impella cannulation Chronic anticoagulation, goal INR 1.5-2.0 Continue warfarin and ASA MSSA drive line infection Stop Augmentin as discussed with team 
 Gentamicin to exit site with daily dressing changes CT x 3 reviewed by Dr. Pop Martinez - small fluid collection adjacent to previous real drain tract has resolved Continue daily dressing changes VT - s/p [facility] BiVICD No recent shocks Follow up with EP as directed COPD Albuterol PRN 
 
JOSE Encouraged patient to be compliant with CPAP therapy Follow up with Dr. Drew Monteiro Encouraged increased protein intake Check prealbumin with next labs Tremors Resolved Continue keppra 250 mg BID Continue clonazepam 0.5 mg BID Significant relief from klonopin - plan to wean keppra once symptoms improved with klonopin BPH On finasteride and tamsulosin Urology consult appreciated 
 ?urologic malignancy Depression On Effexor - mg daily Persistently elevated CEA Persistently elevated CEA; PET scan shows increased activity RML of lung. Oncology consult appreciated - PET not suggestive of malignancy Orthostatic Hypotension Improved Encourage use of [employer] PAF Digoxin 0.125 mg PO daily Intolerant of BB d/t RV failure On warfarin Debility Improving Continue PT/OT Anemia FOB negative CARDIAC IMAGING: 
Chest CTA- no outflow graft occlusion Pet Scan equivocal for amyloid 10/25/19 ECHO ADULT COMPLETE 01/29/2020 1/29/2020 Narrative · Severely dilated left ventricle. Upper normal wall thickness. Severe  
systolic dysfunction. Estimated left ventricular ejection fraction is  
<15%. Left ventricular diastolic dysfunction. · Mitral valve thickening. Minimal mitral valve prolapse of the anterior  
mitral valve leaflet. Trace mitral valve regurgitation is present. · Mild tricuspid valve regurgitation is present. · Mildly dilated right ventricle. Moderately reduced RV systolic function (TAPSE 1.3 cm, RV S' 6 cm/s). Pacer/ICD present. · Mildly biatrial enlargement · Severely elevated central venous pressure (15+ mmHg); IVC diameter is  
larger than 21 mm and collapses less than 50% with respiration. · LVAD inflow cannula spectral Doppler tracings not obtained. Signed by: Mamie Martinez MD  
 
  
  
OTHER IMAGING: 
Head CT negative for acute process EEG suggestive of mild generalized encephalopathic process, possibly related to underlying structural brain injury vs metabolic abnormality Review of Systems Constitutional: Positive for malaise/fatigue. Negative for chills and fever. HENT: Positive for hearing loss. Eyes: Negative. Respiratory: Negative. Cardiovascular: Negative. Negative for chest pain, palpitations and leg swelling. Gastrointestinal: Negative. Genitourinary: Negative for dysuria, frequency, hematuria and urgency. Musculoskeletal: Negative. Skin: Negative. Neurological: Positive for weakness. Negative for dizziness and headaches. Endo/Heme/Allergies: Bruises/bleeds easily. Psychiatric/Behavioral: Negative. Self reported Visit Vitals BP (!) 50/0 Ht 6' 2\" (1.88 m) Wt 187 lb (84.8 kg) BMI 24.01 kg/m² LVAD Pump Speed (RPM): 5800 Pump Flow (LPM): 3.3 PI (Pulsitility Index): 11.7 Power: 4.9 Testing Back up SC speed: 5800 Back up Low Speed Limit: 5400 No results found for this visit on 04/29/20. Physical Exam  
Constitutional: He is oriented to person, place, and time. He appears well-developed and well-nourished. No distress. Pulmonary/Chest: Effort normal. No respiratory distress. Musculoskeletal:     
   General: No edema. Neurological: He is alert and oriented to person, place, and time. Psychiatric: Thought content normal.  
Vitals reviewed. PAST MEDICAL HISTORY: 
Past Medical History:  
Diagnosis Date  Degenerative disc disease, lumbar  Heart failure ([address]  High cholesterol  Hypertension  Paroxysmal atrial fibrillation ([address] 4/2/2019  Spinal stenosis PAST SURGICAL HISTORY: 
Past Surgical History:  
Procedure Laterality Date  COLONOSCOPY Left 11/11/2019 COLONOSCOPY at bedside performed by Tashi Darnell MD at [facility]  HX CORONARY ARTERY BYPASS GRAFT    
 triple  HX HEART ASSIST DEVICE Left 10/29/2019 Impella 5.0  
 HX HEART ASSIST DEVICE Left 11/18/2019 HeartMate 3  
 HX HERNIA REPAIR    
 HX IMPLANTABLE CARDIOVERTER DEFIBRILLATOR  HX THROMBECTOMY Left 11/25/2019 Left brachial thrombectomy  MA CARDIOVERSION ELECTIVE ARRHYTHMIA EXTERNAL N/A 6/10/2019 EP CARDIOVERSION performed by Macrina Shook MD at [address], [facility]  CATH LAB  MA CARDIOVERSION ELECTIVE ARRHYTHMIA EXTERNAL N/A 6/18/2019  EP CARDIOVERSION [facility] [address] [address] CATH LAB  
  VT INSJ/RPLCMT PERM DFB W/TRNSVNS LDS 1/DUAL CHMBR N/A 2019 INSERT ICD BIV MULTI performed by Jaspal Stinson MD at [address], [facility]  CATH LAB  VT TCAT IMPL WRLS P-ART PRS SNR L-T HEMODYN MNTR N/A 2019 IMPLANT HEART FAILURE MONITORING DEVICE performed by Vanessa Obando MD at [address], [facility]  CATH LAB FAMILY HISTORY: 
Family History Problem Relation Age of Onset  Lung Disease Mother  Hypertension Mother Denisee.Hong Arthritis-osteo Mother  Heart Disease Mother  Heart Disease Father  Diabetes Father SOCIAL HISTORY: 
Social History Socioeconomic History  Marital status:  Spouse name: Not on file  Number of children: Not on file  Years of education: Not on file  Highest education level: Not on file Tobacco Use  Smoking status: Former Smoker Last attempt to quit: 2010 Years since quittin.4  Smokeless tobacco: Never Used Substance and Sexual Activity  Alcohol use: Not Currently Comment: rarely  Drug use: Never Other Topics Concern ALLERGY: 
Allergies Allergen Reactions  Cefepime Other (comments)  
  myoclonus CURRENT MEDICATIONS: 
 
Current Outpatient Medications:  
  warfarin (COUMADIN) 1 mg tablet, Take 3 Tabs by mouth daily. May take additional tab as directed by provider., Disp: 200 Tab, Rfl: 3   ciprofloxacin HCl (CIPRO) 750 mg tablet, Take 1 Tab by mouth two (2) times a day., Disp: 60 Tab, Rfl: 3 
  digoxin (LANOXIN) 0.125 mg tablet, Take 1 Tab by mouth daily. , Disp: 90 Tab, Rfl: 3 
  budesonide (PULMICORT) 0.5 mg/2 mL nbsp, 500 mcg by Nebulization route every evening., Disp: , Rfl:  
  albuterol-ipratropium (DUO-NEB) 2.5 mg-0.5 mg/3 ml nebu, 3 mL by Nebulization route nightly., Disp: , Rfl:  
  clonazePAM (KlonoPIN) 0.5 mg tablet, Take 0.5 Tabs by mouth two (2) times a day.  Max Daily Amount: 0.5 mg., Disp: 30 Tab, Rfl: 0 
   sacubitriL-valsartan (Entresto) 24-26 mg tablet, Take 1 Tab by mouth two (2) times a day., Disp: 180 Tab, Rfl: 3 
  tamsulosin (Flomax) 0.4 mg capsule, Take 0.8 mg by mouth nightly., Disp: , Rfl:  
  gentamicin (GARAMYCIN) 0.1 % topical ointment, Apply to exit site daily. , Disp: 30 g, Rfl: 0 
  aspirin delayed-release 81 mg tablet, Take 1 Tab by mouth daily. , Disp: 90 Tab, Rfl: 3 
  venlafaxine-SR (EFFEXOR-XR) 150 mg capsule, Take 1 Cap by mouth daily (with breakfast). , Disp: 90 Cap, Rfl: 3 
  therapeutic multivitamin (THERAGRAN) tablet, Take 1 Tab by mouth daily. , Disp: 90 Tab, Rfl: 3 
  magnesium oxide (MAG-OX) 400 mg tablet, Take 2 Tabs by mouth two (2) times a day., Disp: 360 Tab, Rfl: 3 
  levETIRAcetam (KEPPRA) 250 mg tablet, Take 1 Tab by mouth two (2) times a day., Disp: 180 Tab, Rfl: 3 
  L. acidoph & paracasei- S therm- Bifido (TIAN-Q/RISAQUAD) 8 billion cell cap cap, Take 1 Cap by mouth daily. , Disp: 90 Cap, Rfl: 3 
  finasteride (PROSCAR) 5 mg tablet, Take 1 Tab by mouth daily. , Disp: 90 Tab, Rfl: 3   cholecalciferol (VITAMIN D3) (1000 Units /25 mcg) tablet, Take 2 Tabs by mouth daily. , Disp: 180 Tab, Rfl: 3 
  atorvastatin (LIPITOR) 40 mg tablet, Take 1 Tab by mouth daily. , Disp: 90 Tab, Rfl: 3 
  warfarin (COUMADIN) 3 mg tablet, Take 1 Tab by mouth daily. May take additional tab as directed by provider., Disp: 120 Tab, Rfl: 3 
  albuterol (PROVENTIL HFA, VENTOLIN HFA, PROAIR HFA) 90 mcg/actuation inhaler, Take 2 Puffs by inhalation every six (6) hours as needed for Wheezing., Disp: 1 Inhaler, Rfl: 3 Thank you for letting us see him with you, TIERRA Londono [address], [address] Office [phone] Fax [phone]

## 2020-05-04 NOTE — TELEPHONE ENCOUNTER
Called and spoke with patient's wife. Inquired about making a follow up appointment for patient for this week. Luther Warren stated she would make the appointment now. Transferred patient to [facility].

## 2020-05-05 NOTE — PROGRESS NOTES
Spoke with patient's wife. Jose Luis Nichols verbalized understanding of Coumadin dosing and repeat INR in clinic on Thursday, 5/7/2020.

## 2020-05-05 NOTE — TELEPHONE ENCOUNTER
[phone] - Received call from patient's wife. Annemarie Chung states patient sat in the sun on Sunday and was sunburned. By Sunday evening, both eyelids have become swollen. Patient saw PCP yesterday and \"did not receive any answers\". Annemarie Chung states it is affecting his vision. [facility] stated she noted swelling to the right cheek, but did not note eye swelling. [phone] - called patient after consulting with Jag Shields NP, Annemarie Sheldon verbalized that patient may apply cool compresses to eyelids, stay out of the sun, and will call 911 if symptoms become worse including swelling to lips or tongue.

## 2020-05-07 NOTE — PROGRESS NOTES
driveline dressing change performed per protocol. Redness at driveline exit site noted. Dark red and tanish yellow drainage noted on dressing when removed. Irritation noted to red splothces in skin where adhesive from [place] placed. Redressed with 2 x 2's and hypofix tape to allow for air to get to irritated areas.  Dawit Bailey RN VAD coordinator

## 2020-05-07 NOTE — PROGRESS NOTES
[address] [place] [address] [address] [facility] Virtual Note      Consent: Mary Oneal, who was seen by synchronous (real-time) audio-video technology, and/or his healthcare decision maker, is aware that this patient-initiated, Telehealth encounter on 2020 is a billable service, with coverage as determined by his insurance carrier. He is aware that he may receive a bill and has provided verbal consent to proceed: Yes. Patient name: Mary Oneal  Patient : 1950  Patient MRN: [id number]  Date of service: 05/15/20    CHIEF COMPLAINT:  Chief Complaint   Patient presents with    Follow-up     LVAD       HPI: Memo Kiser is a 71y.o. year old white male with a history of HTN, HLD, JOSE, CAD s/p cardiac arrest VFib s/p CABG () c/b sternal would infection and sternectomy, ischemic cardiomyopathy LVEF 15-20%, s/p BiVICD  who underwent LVAD as DT on 2019 after bridging with Impella 5.0.  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 He was transferred to [facility] rehab on  and discharged from rehab on 2020. Sandra Carlos was seen  for an LVAD follow up visit. Due to fever, chills, and hypotension, he was referred to the ED, evaluated and admitted to [facility]. He underwent treatment for recurrent pseudomonas bacteremia due to UTI. His Cipro was resumed and he was discharged home in stable condition. He returns today for follow up. He denies complaints, specifically rever, chills, rigors, dyspnea, orthopnea, dizziness, lightheadedness, CP, palpitations. He continues to receive [facility] SN and PT services. He ambulated into clinic today with his walker.         Assessment/Plan:   NYHA Class II    ICM - Stage D,  LVEF 15%, NYHA Class IV improved to NYHA Class III s/p HM3 LVAD as DT on 19 with Impella 5.0 as bridge to LVAD   Continue LVAD speed at 5800 rpm    TTE 4/20 shows large LVIDd, 6.84 cm, RVIDd 3.06 cm, TAPSE 1.15 cm, severely elevated CVP   Continue digoxin 0.125 mg PO daily to support RV through infection    Intolerant to BB d/t RV failure   Continue Entresto  24/26 mg PO BID    Intolerant to spironolactone due to IVVD and hyponatremia   Dr. Adrian Vega would like to pull sternal wire once stable - will need to wait until active infection has resolved      HTN, goal MAP 70-90 mmHg   Continue Entresto   MAPs on LUE only d/t previous right axillary Impella cannulation     Sepsis, related to recurrent pseudomonal infection   BC 4/20 - 2/4 bottles growing pseudomonas   UA suspicious for UTI; but urine culture shows < 1,000 CFU    Continue cipro indefinitely   Repeat wound culture today     Chronic anticoagulation, goal INR 1.5-2.0   Continue warfarin and ASA     MSSA drive line infection   Augmentin discontinued due to negative culture   Repeat culture d/t increased drainage    Gentamicin to exit site with daily dressing changes   CT x 3 reviewed by Dr. Adrian Vega - small fluid collection adjacent to previous real drain tract has resolved   Continue daily dressing changes      VT - s/p [facility] BiVICD   No recent shocks   Follow up with EP as directed    COPD   Albuterol PRN    JOSE   Encouraged patient to be compliant with CPAP therapy   Follow up with Dr. Darren Villegas   Encouraged increased protein intake     Tremors   Resolved   Continue keppra 250 mg BID   Continue clonazepam 0.5 mg BID   Significant relief from klonopin - plan to wean keppra once symptoms improved with klonopin     BPH   On finasteride and tamsulosin   Urology consult appreciated    Depression   On Effexor - mg daily     Persistently elevated CEA   Persistently elevated CEA; PET scan shows increased activity RML of lung.     Oncology consult appreciated - PET not suggestive of malignancy     Orthostatic Hypotension   Improved   Encourage use of [place] Hose    PAF   Digoxin 0.125 mg PO daily    Intolerant of BB d/t RV failure   On warfarin    Debility   Improving   Continue PT/OT     Anemia   FOB negative      CARDIAC IMAGING:  Chest CTA- no outflow graft occlusion   Pet Scan equivocal for amyloid    10/25/19   ECHO ADULT COMPLETE 01/29/2020 1/29/2020    Narrative · Severely dilated left ventricle. Upper normal wall thickness. Severe   systolic dysfunction. Estimated left ventricular ejection fraction is   <15%. Left ventricular diastolic dysfunction. · Mitral valve thickening. Minimal mitral valve prolapse of the anterior   mitral valve leaflet. Trace mitral valve regurgitation is present. · Mild tricuspid valve regurgitation is present. · Mildly dilated right ventricle. Moderately reduced RV systolic function   (TAPSE 1.3 cm, RV S' 6 cm/s). Pacer/ICD present. · Mildly biatrial enlargement  · Severely elevated central venous pressure (15+ mmHg); IVC diameter is   larger than 21 mm and collapses less than 50% with respiration. · LVAD inflow cannula spectral Doppler tracings not obtained. Signed by: Lily Calderon MD           OTHER IMAGING:  Head CT negative for acute process  EEG suggestive of mild generalized encephalopathic process, possibly related to underlying structural brain injury vs metabolic abnormality    Review of Systems   Constitutional: Positive for malaise/fatigue. Negative for chills and fever. HENT: Positive for hearing loss. Eyes: Negative. Respiratory: Negative. Cardiovascular: Negative. Negative for chest pain, palpitations and leg swelling. Gastrointestinal: Negative. Genitourinary: Negative for dysuria, frequency, hematuria and urgency. Musculoskeletal: Negative. Skin: Negative. Neurological: Positive for weakness. Negative for dizziness and headaches. Endo/Heme/Allergies: Bruises/bleeds easily. Psychiatric/Behavioral: Negative.         Self reported   Visit Vitals  BP (!) 82/0 (BP 1 Location: Left arm, BP Patient Position: Sitting)   Ht 6' 2\" (1.88 m)   Wt 182 lb (82.6 kg)   BMI 23.37 kg/m²           LVAD   Pump Speed (RPM): 5800  Pump Flow (LPM): 5.4  PI (Pulsitility Index): 5  Power: 4.4         Results for orders placed or performed in visit on 05/07/20   AEROBIC BACTERIAL CULTURE   Result Value Ref Range    Misc. aerobic culture Staphylococcus aureus  Moderate growth   (A)        Susceptibility    Staphylococcus aureus -  (no method available)*     Ciprofloxacin ($) R Resistant ug/mL     Clindamycin ($) R Resistant ug/mL     Erythromycin ($$$$) R Resistant ug/mL     Gentamicin ($) S Susceptible ug/mL     Levofloxacin ($) R Resistant ug/mL     Linezolid ($$$$$) S Susceptible ug/mL     Moxifloxacin ($$$$) R Resistant ug/mL     Oxacillin S Susceptible ug/mL     Penicillin R Resistant ug/mL     Quinupristin Dalfopr Synd S Susceptible ug/mL     Rifampin ($$$$) S Susceptible ug/mL     Tetracycline S Susceptible ug/mL     Trimeth-Sulfamethoxa S Susceptible ug/mL     Vancomycin ($) S Susceptible ug/mL     * Performed at:  [address] [place] [address]  [id number] Director: Nel Jefferson MD, Phone:  [phone]    Narrative    Performed at:  [address]  [id number]  : Nel Jefferson MD, Phone:  [phone]   OK INTERROGATION VAD IN [facility] W/PHYS/QHP ANALYSIS    Impression    Alarm history reviewed. Please see VAD flow sheet for readings. No PI events or adverse alarms noted. Settings reviewed, but no changes made. Physical Exam   Constitutional: He is oriented to person, place, and time. He appears well-developed and well-nourished. No distress. Pulmonary/Chest: Effort normal. No respiratory distress. CV: +VAD hum, + doppler pulses   Musculoskeletal:         General: No edema. Neurological: He is alert and oriented to person, place, and time. Psychiatric: Thought content normal.   Vitals reviewed.     Drive line exam: Moderate purulent drainage from exit site which is erythematous. Results for orders placed or performed in visit on 05/07/20   AEROBIC BACTERIAL CULTURE   Result Value Ref Range    Misc. aerobic culture Staphylococcus aureus  Moderate growth   (A)        Susceptibility    Staphylococcus aureus -  (no method available)*     Ciprofloxacin ($) R Resistant ug/mL     Clindamycin ($) R Resistant ug/mL     Erythromycin ($$$$) R Resistant ug/mL     Gentamicin ($) S Susceptible ug/mL     Levofloxacin ($) R Resistant ug/mL     Linezolid ($$$$$) S Susceptible ug/mL     Moxifloxacin ($$$$) R Resistant ug/mL     Oxacillin S Susceptible ug/mL     Penicillin R Resistant ug/mL     Quinupristin Dalfopr Synd S Susceptible ug/mL     Rifampin ($$$$) S Susceptible ug/mL     Tetracycline S Susceptible ug/mL     Trimeth-Sulfamethoxa S Susceptible ug/mL     Vancomycin ($) S Susceptible ug/mL     * Performed at:  [address] [place] [address]  [id number] Director: Sampson Palacios MD, Phone:  [phone]    Narrative    Performed at:  [address]  [id number]  : Sampson Palacios MD, Phone:  [phone]   DE INTERROGATION VAD IN [facility] W/PHYS/QHP ANALYSIS    Impression    Alarm history reviewed. Please see VAD flow sheet for readings. No PI events or adverse alarms noted. Settings reviewed, but no changes made.          PAST MEDICAL HISTORY:  Past Medical History:   Diagnosis Date    Degenerative disc disease, lumbar     Heart failure ([address]     High cholesterol     Hypertension     Paroxysmal atrial fibrillation ([address] 4/2/2019    Spinal stenosis        PAST SURGICAL HISTORY:  Past Surgical History:   Procedure Laterality Date    COLONOSCOPY Left 11/11/2019    COLONOSCOPY at bedside performed by Sudhir Matson MD at [facility] GRAFT      triple    HX HEART ASSIST DEVICE Left 10/29/2019    Sly 5.0    HX HEART ASSIST DEVICE Left 2019    HeartMate 3    HX HERNIA REPAIR      HX IMPLANTABLE CARDIOVERTER DEFIBRILLATOR      HX THROMBECTOMY Left 2019    Left brachial thrombectomy    NV CARDIOVERSION ELECTIVE ARRHYTHMIA EXTERNAL N/A 6/10/2019    EP CARDIOVERSION performed by Qamar Jack MD at [address] CATH LAB    NV CARDIOVERSION ELECTIVE ARRHYTHMIA EXTERNAL N/A 2019    EP CARDIOVERSION performed by Jackelyn River MD at [address] CATH LAB    NV INSJ/RPLCMT PERM DFB W/TRNSVNS LDS 1/DUAL CHMBR N/A 2019    INSERT ICD BIV MULTI performed by Kanwal Matthew MD at [address] CATH LAB    NV TCAT IMPL WRLS P-ART PRS SNR L-T HEMODYN MNTR N/A 2019    IMPLANT HEART FAILURE MONITORING DEVICE performed by Constantine Tijerina MD at [address] CATH LAB       FAMILY HISTORY:  Family History   Problem Relation Age of Onset    Lung Disease Mother     Hypertension Mother     Arthritis-osteo Mother     Heart Disease Mother     Heart Disease Father     Diabetes Father        SOCIAL HISTORY:  Social History     Socioeconomic History    Marital status:      Spouse name: Not on file    Number of children: Not on file    Years of education: Not on file    Highest education level: Not on file   Tobacco Use    Smoking status: Former Smoker     Last attempt to quit: 2010     Years since quittin.4    Smokeless tobacco: Never Used   Substance and Sexual Activity    Alcohol use: Not Currently     Comment: rarely    Drug use: Never   Other Topics Concern       ALLERGY:  Allergies   Allergen Reactions    Cefepime Other (comments)     myoclonus        CURRENT MEDICATIONS:    Current Outpatient Medications:     digoxin (LANOXIN) 0.125 mg tablet, Take 1 Tab by mouth daily. , Disp: 90 Tab, Rfl: 3    warfarin (COUMADIN) 1 mg tablet, Take 3 Tabs by mouth daily.  May take additional tab as directed by provider., Disp: 200 Tab, Rfl: 3    ciprofloxacin HCl (CIPRO) 750 mg tablet, Take 1 Tab by mouth two (2) times a day., Disp: 60 Tab, Rfl: 3    budesonide (PULMICORT) 0.5 mg/2 mL nbsp, 500 mcg by Nebulization route every evening., Disp: , Rfl:     albuterol-ipratropium (DUO-NEB) 2.5 mg-0.5 mg/3 ml nebu, 3 mL by Nebulization route nightly., Disp: , Rfl:     clonazePAM (KlonoPIN) 0.5 mg tablet, Take 0.5 Tabs by mouth two (2) times a day. Max Daily Amount: 0.5 mg., Disp: 30 Tab, Rfl: 0    sacubitriL-valsartan (Entresto) 24-26 mg tablet, Take 1 Tab by mouth two (2) times a day., Disp: 180 Tab, Rfl: 3    tamsulosin (Flomax) 0.4 mg capsule, Take 0.8 mg by mouth nightly., Disp: , Rfl:     gentamicin (GARAMYCIN) 0.1 % topical ointment, Apply to exit site daily. , Disp: 30 g, Rfl: 0    aspirin delayed-release 81 mg tablet, Take 1 Tab by mouth daily. , Disp: 90 Tab, Rfl: 3    venlafaxine-SR (EFFEXOR-XR) 150 mg capsule, Take 1 Cap by mouth daily (with breakfast). , Disp: 90 Cap, Rfl: 3    therapeutic multivitamin (THERAGRAN) tablet, Take 1 Tab by mouth daily. , Disp: 90 Tab, Rfl: 3    levETIRAcetam (KEPPRA) 250 mg tablet, Take 1 Tab by mouth two (2) times a day., Disp: 180 Tab, Rfl: 3    finasteride (PROSCAR) 5 mg tablet, Take 1 Tab by mouth daily. , Disp: 90 Tab, Rfl: 3    cholecalciferol (VITAMIN D3) (1000 Units /25 mcg) tablet, Take 2 Tabs by mouth daily. , Disp: 180 Tab, Rfl: 3    atorvastatin (LIPITOR) 40 mg tablet, Take 1 Tab by mouth daily. , Disp: 90 Tab, Rfl: 3    warfarin (COUMADIN) 3 mg tablet, Take 1 Tab by mouth daily. May take additional tab as directed by provider., Disp: 120 Tab, Rfl: 3    albuterol (PROVENTIL HFA, VENTOLIN HFA, PROAIR HFA) 90 mcg/actuation inhaler, Take 2 Puffs by inhalation every six (6) hours as needed for Wheezing., Disp: 1 Inhaler, Rfl: 3    amoxicillin-clavulanate (AUGMENTIN) 875-125 mg per tablet, Take 1 Tab by mouth two (2) times a day., Disp: 60 Tab, Rfl: 3    magnesium oxide (MAG-OX) 400 mg tablet, Take 2 Tabs by mouth two (2) times a day. , Disp: 360 Tab, Rfl: 3    L. acidoph & paracasei- S therm- Bifido (TIAN-Q/RISAQUAD) 8 billion cell cap cap, Take 1 Cap by mouth daily. , Disp: 90 Cap, Rfl: 3      Thank you for letting us see him with you,    Rosibel Hernandez, TIERRA  [facility]  [address] [facility], [address] [facility], [address]  Office [phone]  Fax [phone]

## 2020-05-07 NOTE — PATIENT INSTRUCTIONS
Medication changes:    No medication changes today. A refill of digoxin has been sent to [facility]. You no longer need to take hydralazine; we have discarded the remaining pills. Please take this to your pharmacy to notify them of the change in medications. Testing Ordered: We will review your labs from [address] [place] [address] Turner Lyons yesterday, and call you with the results. Other Recommendations:      Ensure your drinking an adequate amount of water with a goal of 6-8 eight ounce glasses (1.5-2 liters) of fluid daily. Your urine should be clear and light yellow straw colored. If your blood pressure begins to consistently run below 90/60 and/or you begin to experience dizziness or lightheadedness, please contact the [facility] at [phone]. Follow up 2 weeks for a Virtual Visit with [facility]. We will schedule you for a visit with Dr. Amalia óLpez in person in 4 weeks. Please monitor your blood pressures daily prior to medications and 2 hours after taking medications. Bring a written record of your blood pressures to your next appointment. Please monitor your weights daily upon waking and after using the bathroom. Keep a written records of your weights and bring to your next appointment. If you have a weight gain of 3 or more pounds overnight OR 5 or more pounds in one week please contact our office. Thank you for allowing us the privilege of being a part of your healthcare team! Please do not hesitate to contact our office at [phone] with any questions or concerns.

## 2020-05-11 NOTE — PROGRESS NOTES
Patient resolved from Transition of Care episode on 5/11/2020 Patient/family has been provided the following resources and education related to COVID-19:  
           
          Signs, symptoms and red flags related to COVID-19 CDC exposure and quarantine guidelines Conduit exposure contact - [phone] Contact for their local Department of Health Patient currently reports that the following symptoms have improved:  no new symptoms. No further outreach scheduled with this CTN/ACM. Episode of Care resolved. Patient has this CTN/ACM contact information if future needs arise.

## 2020-05-12 NOTE — PROGRESS NOTES
Spoke with patient's wife. Rona Emery verbalized understanding of Coumadin dosing and repeat INR in 1 week.

## 2020-05-18 NOTE — TELEPHONE ENCOUNTER
Received message from Korina ([facility] RN) with picture of patient's driveline with appears to be swollen and with increased redness. Arlene confirmed this assessment. Called and spoke with patient's wife. Jermaine Escobar verbalized understanding to switch 5/22/20 appointment from virtual to in clinic, resume daily dressing changes, and resume Augmentin dose.

## 2020-05-18 NOTE — PROGRESS NOTES
Spoke with patient's wife. Sasha Gonzalez verbalized understanding of Coumadin dosing and repeat INR in 1 week.

## 2020-05-18 NOTE — TELEPHONE ENCOUNTER
0092 -Received message from patient's wife. Klaus Ricardo stated that she believes patient's defibrillator fired on Saturday. Patient was seated in wheelchair and did not fall. He \"felt a shock from head to toes\". Klaus Ricardo stated she did not call because they did not want to come to the hospital.    1002 - called and spoke with patient's wife. Klaus Davion states patient \"feels fine\". Requested patient transmit a reqding for his ICD. Klaus Ricardo verbalized agreement and states she will do so \"shortly\". 1120 - spoke with Dr. Bertha Morris office. Per Chyna Motley has received patient's ICD interrogation and was sent to [facility] (Dr. Bertha Morris nurse), requested a copy be faxed to [facility] and for [facility] to return my call. 1125 - spoke with [facility] who states she has forwarded ICD interrogation to Dr. Dustin Rosas. [facility] states Dr. Dustin Rosas will address the ICD shock.

## 2020-05-22 NOTE — PROGRESS NOTES
[address] [place] [address] [address] [facility] Clinic Note      Patient name: Sabine Quiñones  Patient : 1950  Patient MRN: [id number]  Date of service: 20    CHIEF COMPLAINT:  Chief Complaint   Patient presents with    Follow-up     LVAD       HPI: Abdulaziz brenner 71y.o. year old white male with a history of HTN, HLD, JOSE, CAD s/p cardiac arrest VFib s/p CABG () c/b sternal would infection and sternectomy, ischemic cardiomyopathy LVEF 15-20%, s/p BiVICD  who underwent LVAD as DT on 2019 after bridging with Impella 5.0.  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 He was transferred to [address] [place] [address] rehab on  and discharged from rehab on 2020. Palomo Gale was seen  for an LVAD follow up visit. Due to fever, chills, and hypotension, he was referred to the ED, evaluated and admitted to [facility]. He underwent treatment for recurrent pseudomonas bacteremia due to UTI. His Cipro was resumed and he was discharged home in stable condition. He returns today for LVAD follow up. He denies complaints, specifically rever, chills, rigors, dyspnea, orthopnea, dizziness, lightheadedness, CP, palpitations. [facility]. He ambulated into clinic today with his walker. He had a recent AICD shock for Vfib over the weekend, EP office notified. He continues to have increased drainage from drive line site and worsening edema in the last week. Augmentin was resumed for MSSA culture on 20. Otherwise, he states he feels better then he has in weeks, increasing his activity and has been discharged by OT but continues to work with PT. He is compliant with his medications.        Assessment/Plan:   NYHA Class II    ICM - Stage D,  LVEF 15%, NYHA Class IV improved to NYHA Class III s/p HM3 LVAD as DT on 19 with Impella 5.0 as bridge to LVAD   Continue LVAD speed at 5800 rpm    TTE 4/20 shows large LVIDd, 6.84 cm, RVIDd 3.06 cm, TAPSE 1.15 cm, severely elevated CVP   Continue digoxin 0.125 mg PO daily to support RV through infection    Intolerant to BB d/t RV failure   Continue Entresto  24/26 mg PO BID    Intolerant to spironolactone due to IVVD and hyponatremia   Dr. Jeff Rodriguez would like to pull sternal wire once stable - will need to wait until active infection has resolved      HTN, goal MAP 70-90 mmHg   Continue Entresto   MAPs on LUE only d/t previous right axillary Impella cannulation     Sepsis, related to recurrent pseudomonal infection   BC 4/20 - 2/4 bottles growing pseudomonas   UA suspicious for UTI; but urine culture shows < 1,000 CFU    Continue cipro indefinitely      Chronic anticoagulation, goal INR 1.5-2.0   Continue warfarin and ASA    See anticoag tracker     MSSA drive line infection   Repeat culture from 5/7/20- MSSA again   Augmentin resumed last week    Repeat culture d/t increased drainage today   Gentamicin to exit site with daily dressing changes   CT x 3 reviewed by Dr. Jeff Rodriguez - small fluid collection adjacent to previous real drain tract has resolved   Continue daily dressing changes   Repeat CT today       VT - s/p [facility] BiVICD   AICD shock 5/16- sent to EP   Follow up with EP    COPD   Albuterol PRN    JOSE   Encouraged patient to be compliant with CPAP therapy   Follow up with Dr. Jadyn Ayala increased protein intake     Tremors   Resolved   Continue keppra 250 mg BID   Continue clonazepam 0.5 mg BID   Significant relief from klonopin - plan to wean keppra once symptoms improved with klonopin     BPH   On finasteride and tamsulosin   Urology consult appreciated    Depression   On Effexor - mg daily     Persistently elevated CEA   Persistently elevated CEA; PET scan shows increased activity RML of lung.     Oncology consult appreciated - PET not suggestive of malignancy Orthostatic Hypotension   Resolved    Encourage use of Angel Hose    PAF   Digoxin 0.125 mg PO daily- check level today    Intolerant of BB d/t RV failure   On warfarin    Debility   Improving   Continue PT/OT- 2x/week at home        CARDIAC IMAGING:  Chest CTA- no outflow graft occlusion   Pet Scan equivocal for amyloid    04/20/20   ECHO ADULT COMPLETE 04/21/2020 4/21/2020    Narrative · Image quality for this study was technically difficult. · Normal wall thickness. Severely dilated left ventricle. Severe systolic   function. Estimated left ventricular ejection fraction is <15%. LVAD   present. 5800 rpm  · Dilated left atrium. · RV Not well visualized. Mildly reduced systolic function. · Mild aortic valve regurgitation is present. Aortic valve does not open   in systole. · Mild mitral valve regurgitation is present. · Mild to moderate tricuspid valve regurgitation is present. · Severely elevated central venous pressure (15+ mmHg); IVC diameter is   larger than 21 mm and collapses less than 50% with respiration. Signed by: Hudson Batista MD         10/25/19   ECHO ADULT COMPLETE 01/29/2020 1/29/2020    Narrative · Severely dilated left ventricle. Upper normal wall thickness. Severe   systolic dysfunction. Estimated left ventricular ejection fraction is   <15%. Left ventricular diastolic dysfunction. · Mitral valve thickening. Minimal mitral valve prolapse of the anterior   mitral valve leaflet. Trace mitral valve regurgitation is present. · Mild tricuspid valve regurgitation is present. · Mildly dilated right ventricle. Moderately reduced RV systolic function   (TAPSE 1.3 cm, RV S' 6 cm/s). Pacer/ICD present. · Mildly biatrial enlargement  · Severely elevated central venous pressure (15+ mmHg); IVC diameter is   larger than 21 mm and collapses less than 50% with respiration. · LVAD inflow cannula spectral Doppler tracings not obtained.         Signed by: Liza Braswell MD           OTHER IMAGING:  Head CT negative for acute process  EEG suggestive of mild generalized encephalopathic process, possibly related to underlying structural brain injury vs metabolic abnormality    Review of Systems   Constitutional: Negative for chills, fever and malaise/fatigue. Respiratory: Negative for cough, sputum production and shortness of breath. Cardiovascular: Positive for leg swelling. Negative for chest pain, palpitations and PND.        + AICD shock    Gastrointestinal: Negative for blood in stool, heartburn, nausea and vomiting. Musculoskeletal: Negative. Neurological: Positive for weakness. Negative for dizziness and headaches. Endo/Heme/Allergies: Bruises/bleeds easily. Psychiatric/Behavioral: Negative. Visit Vitals  BP (!) 82/0 (BP 1 Location: Left arm, BP Patient Position: Sitting)   Pulse 76   Temp 98.1 °F (36.7 °C) (Oral)   Resp 18   Ht 6' 2\" (1.88 m)   Wt 184 lb (83.5 kg)   SpO2 98%   BMI 23.62 kg/m²           LVAD   Pump Speed (RPM): 5800  Pump Flow (LPM): 5.1  PI (Pulsitility Index): 3.2  Power: 4.5  Outpatient: Yes  MAP in Therapeutic Range (Outpatient): Yes  Testing  Alarms Reviewed: Yes  Back up SC speed: 5800  Back up Low Speed Limit: 5400  Drive line site inspected?: Yes  Drive line intergrity inspected?: Yes  Drive line dressing changed?: Yes      Results for orders placed or performed in visit on 05/22/20   [place] INTERROGATION VAD IN [place] W/PHYS/QHP ANALYSIS    Narrative    Alarm history reviewed. Please see VAD flow sheet for readings. Frequent PI events but no adverse alarms noted. Settings reviewed, but no changes made. Physical Exam   Constitutional: He is oriented to person, place, and time. He appears well-developed and well-nourished. No distress. Eyes: Pupils are equal, round, and reactive to light. Neck: Normal range of motion. No JVD present. Cardiovascular: Normal rate, regular rhythm and normal heart sounds.    + VAD hum   Pulmonary/Chest: Effort normal and breath sounds normal. No respiratory distress. Abdominal: Soft. Bowel sounds are normal. He exhibits no distension. Musculoskeletal: Normal range of motion. General: Edema present. Comments: +1BLE- improved    Neurological: He is alert and oriented to person, place, and time. Skin: Skin is warm and dry. Drive line site- + erythema, edema, mod purulent drainage    Psychiatric: He has a normal mood and affect. Vitals reviewed. Results for orders placed or performed in visit on 05/22/20   [place] INTERROGATION VAD IN [facility] W/PHYS/QHP ANALYSIS    Narrative    Alarm history reviewed. Please see VAD flow sheet for readings. Frequent PI events but no adverse alarms noted. Settings reviewed, but no changes made.          PAST MEDICAL HISTORY:  Past Medical History:   Diagnosis Date    Degenerative disc disease, lumbar     Heart failure ([address]     High cholesterol     Hypertension     Paroxysmal atrial fibrillation ([facility] [address] 4/2/2019    Spinal stenosis        PAST SURGICAL HISTORY:  Past Surgical History:   Procedure Laterality Date    COLONOSCOPY Left 11/11/2019    COLONOSCOPY at bedside performed by Peyman Lopez MD at [address] HX APPENDECTOMY      [address] GRAFT      triple    HX HEART ASSIST DEVICE Left 10/29/2019    Impella 5.0    HX HEART ASSIST DEVICE Left 11/18/2019    HeartMate 3    HX HERNIA REPAIR      HX IMPLANTABLE CARDIOVERTER DEFIBRILLATOR      HX THROMBECTOMY Left 11/25/2019    Left brachial thrombectomy    [place] CARDIOVERSION ELECTIVE ARRHYTHMIA EXTERNAL N/A 6/10/2019    EP CARDIOVERSION performed by Leatha Harvey MD at [place] [address], [address] CATH LAB    [place] CARDIOVERSION ELECTIVE ARRHYTHMIA EXTERNAL N/A 6/18/2019    EP CARDIOVERSION performed by Andrey Garcia MD at [place] [address], [address] CATH LAB    VA INSJ/RPLCMT PERM DFB W/TRNSVNS LDS 1/DUAL CHMBR N/A 6/21/2019    INSERT ICD BIV MULTI performed by Brodie Rebolledo MD at [place] [address], [address] CATH LAB  AR TCAT IMPL WRLS P-ART PRS SNR L-T HEMODYN MNTR N/A 2019    IMPLANT HEART FAILURE MONITORING DEVICE performed by Giuseppe Brandon MD at [address], [facility] [facility]       FAMILY HISTORY:  Family History   Problem Relation Age of Onset    Lung Disease Mother     Hypertension Mother     Arthritis-osteo Mother     Heart Disease Mother     Heart Disease Father     Diabetes Father        SOCIAL HISTORY:  Social History     Socioeconomic History    Marital status:      Spouse name: Not on file    Number of children: Not on file    Years of education: Not on file    Highest education level: Not on file   Tobacco Use    Smoking status: Former Smoker     Last attempt to quit: 2010     Years since quittin.4    Smokeless tobacco: Never Used   Substance and Sexual Activity    Alcohol use: Not Currently     Comment: rarely    Drug use: Never   Other Topics Concern       ALLERGY:  Allergies   Allergen Reactions    Cefepime Other (comments)     myoclonus        CURRENT MEDICATIONS:    Current Outpatient Medications:     magnesium oxide (MAG-OX) 400 mg tablet, Take 2 Tabs by mouth two (2) times a day., Disp: 360 Tab, Rfl: 3    amoxicillin-clavulanate (AUGMENTIN) 875-125 mg per tablet, Take 1 Tab by mouth two (2) times a day., Disp: 60 Tab, Rfl: 3    digoxin (LANOXIN) 0.125 mg tablet, Take 1 Tab by mouth daily. , Disp: 90 Tab, Rfl: 3    warfarin (COUMADIN) 1 mg tablet, Take 3 Tabs by mouth daily. May take additional tab as directed by provider., Disp: 200 Tab, Rfl: 3    ciprofloxacin HCl (CIPRO) 750 mg tablet, Take 1 Tab by mouth two (2) times a day., Disp: 60 Tab, Rfl: 3    budesonide (PULMICORT) 0.5 mg/2 mL nbsp, 500 mcg by Nebulization route every evening., Disp: , Rfl:     albuterol-ipratropium (DUO-NEB) 2.5 mg-0.5 mg/3 ml nebu, 3 mL by Nebulization route nightly., Disp: , Rfl:     clonazePAM (KlonoPIN) 0.5 mg tablet, Take 0.5 Tabs by mouth two (2) times a day.  Max Daily Amount: 0.5 mg., Disp: 30 Tab, Rfl: 0    sacubitriL-valsartan (Entresto) 24-26 mg tablet, Take 1 Tab by mouth two (2) times a day., Disp: 180 Tab, Rfl: 3    tamsulosin (Flomax) 0.4 mg capsule, Take 0.8 mg by mouth nightly., Disp: , Rfl:     gentamicin (GARAMYCIN) 0.1 % topical ointment, Apply to exit site daily. , Disp: 30 g, Rfl: 0    aspirin delayed-release 81 mg tablet, Take 1 Tab by mouth daily. , Disp: 90 Tab, Rfl: 3    venlafaxine-SR (EFFEXOR-XR) 150 mg capsule, Take 1 Cap by mouth daily (with breakfast). , Disp: 90 Cap, Rfl: 3    therapeutic multivitamin (THERAGRAN) tablet, Take 1 Tab by mouth daily. , Disp: 90 Tab, Rfl: 3    levETIRAcetam (KEPPRA) 250 mg tablet, Take 1 Tab by mouth two (2) times a day., Disp: 180 Tab, Rfl: 3    L. acidoph & paracasei- S therm- Bifido (TIAN-Q/RISAQUAD) 8 billion cell cap cap, Take 1 Cap by mouth daily. , Disp: 90 Cap, Rfl: 3    finasteride (PROSCAR) 5 mg tablet, Take 1 Tab by mouth daily. , Disp: 90 Tab, Rfl: 3    cholecalciferol (VITAMIN D3) (1000 Units /25 mcg) tablet, Take 2 Tabs by mouth daily. , Disp: 180 Tab, Rfl: 3    atorvastatin (LIPITOR) 40 mg tablet, Take 1 Tab by mouth daily. , Disp: 90 Tab, Rfl: 3    warfarin (COUMADIN) 3 mg tablet, Take 1 Tab by mouth daily.  May take additional tab as directed by provider., Disp: 120 Tab, Rfl: 3    albuterol (PROVENTIL HFA, VENTOLIN HFA, PROAIR HFA) 90 mcg/actuation inhaler, Take 2 Puffs by inhalation every six (6) hours as needed for Wheezing., Disp: 1 Inhaler, Rfl: 3      Thank you for letting us see him with you,    Jd Melara, NP  [address] [facility] [place]  [address]  Office [phone]  Fax [phone]

## 2020-05-22 NOTE — PATIENT INSTRUCTIONS
No Medication changes today. Testing Ordered:will call with results. Ct of [place] today at 0:80. We will call with results. Labs drawn in clinic today. We will call with any abnormal results. Other Recommendations:      Ensure you are drinking an adequate amount of water with a goal of 6-8 eight ounce glasses (1.5-2 liters) of fluid daily. Your urine should be clear and light yellow straw colored. Continue daily dressing change. Follow up in 2 weeks  with [facility] [place]    Please bring your daily sheets and medications to your next appointment. Please monitor your weights daily upon waking and after using the bathroom. Keep a written records of your weights and bring to your next appointment. If you have a weight gain of 3 or more pounds overnight OR 5 or more pounds in one week please contact our office. Thank you for allowing us the privilege of being a part of your healthcare team! Please do not hesitate to contact our office at [phone] with any questions or concerns.

## 2020-05-26 NOTE — PROGRESS NOTES
Spoke with patient's wife. Emma Vega verbalized understanding of Coumadin dosing and to repeat INR in 1 week.

## 2020-05-29 NOTE — TELEPHONE ENCOUNTER
Telephone Call RE:  Lab Reminder      Outcome:     [x] Patient verbalizes understanding    [] Unable to reach   [] Left message              []       Herb Pro

## 2020-06-01 NOTE — TELEPHONE ENCOUNTER
Notified by [facility] RN that patient has stopped taking Lipitor because it \"caused his legs to ache tremendously\" . Patient has been off Lipitor for 1 week and has his leg ache is improved. Arlene ([facility] RN) is asking for a substitute.

## 2020-06-03 NOTE — ED PROVIDER NOTES
Ye Parson is a 71 y.o. male with past medical history notable for failure and severe ischemic cardiomyopathy, LVAD in place, anticoagulated with warfarin, INR goal 1.5-2.0 presenting with dizziness primarily associated with exertion, changing position, presenting with syncopal episode today in which he struck his head. No headache, neck pain, other sites of pain. Has not had lightheadedness when he is recumbent in the ED. No recent fevers, chills, cough. Denies angina. No weight change. Past Medical History:  
Diagnosis Date  Degenerative disc disease, lumbar  [facility] failure [address] [address]  High cholesterol  Hypertension  Paroxysmal atrial fibrillation ([facility] [address] 4/2/2019  Spinal stenosis Past Surgical History:  
Procedure Laterality Date  COLONOSCOPY Left 11/11/2019 COLONOSCOPY at bedside performed by Angel Alexander MD at [address]  HX CORONARY ARTERY BYPASS GRAFT    
 triple  HX HEART ASSIST DEVICE Left 10/29/2019 Impella 5.0  
 HX HEART ASSIST DEVICE Left 11/18/2019 HeartMate 3  
 HX HERNIA REPAIR    
 HX IMPLANTABLE CARDIOVERTER DEFIBRILLATOR  HX THROMBECTOMY Left 11/25/2019 Left brachial thrombectomy  [place] CARDIOVERSION ELECTIVE ARRHYTHMIA EXTERNAL N/A 6/10/2019 EP CARDIOVERSION performed by Shel[place] [address], [address] CATH LAB  [place] CARDIOVERSION ELECTIVE ARRHYTHMIA EXTERNAL N/A 6/18/2019 EP CARDIOVERSION performed by Kary Young MD at [place] [address], [address] CATH LAB  [place] INSJ/RPLCMT PERM DFB W/TRNSVNS LDS 1/DUAL CHMBR N/A 6/21/2019 INSERT ICD BIV MULTI performed by Zahra Molina MD at [place] [address], [address] CATH LAB  [place] TCAT IMPL WRLS P-ART PRS SNR L-T HEMODYN MNTR N/A 9/18/2019 IMPLANT HEART FAILURE MONITORING DEVICE performed by Rommel Hernandez MD at [place] [address], [address] CATH LAB Family History:  
Problem Relation Age of Onset  Lung Disease Mother  Hypertension Mother [facility] Arthritis-osteo Mother  Heart Disease Mother  Heart Disease Father  Diabetes Father Social History Socioeconomic History  Marital status:  Spouse name: Not on file  Number of children: Not on file  Years of education: Not on file  Highest education level: Not on file Occupational History  Not on file Social Needs  Financial resource strain: Not on file  Food insecurity Worry: Not on file Inability: Not on file  Transportation needs Medical: Not on file Non-medical: Not on file Tobacco Use  Smoking status: Former Smoker Last attempt to quit: 2010 Years since quittin.5  Smokeless tobacco: Never Used Substance and Sexual Activity  Alcohol use: Not Currently Comment: rarely  Drug use: Never  Sexual activity: Not on file Lifestyle  Physical activity Days per week: Not on file Minutes per session: Not on file  Stress: Not on file Relationships  Social connections Talks on phone: Not on file Gets together: Not on file Attends Mandaen service: Not on file Active member of club or organization: Not on file Attends meetings of clubs or organizations: Not on file Relationship status: Not on file  Intimate partner violence Fear of current or ex partner: Not on file Emotionally abused: Not on file Physically abused: Not on file Forced sexual activity: Not on file Other Topics Concern [address] Service Not Asked  Blood Transfusions Not Asked  Caffeine Concern Not Asked  Occupational Exposure Not Asked [place] Hazards Not Asked  Sleep Concern Not Asked  Stress Concern Not Asked  Weight Concern Not Asked  Special Diet Not Asked  Back Care Not Asked  Exercise Not Asked  Bike Helmet Not Asked  Seat Belt Not Asked  Self-Exams Not Asked Social History Narrative  Not on file ALLERGIES: Cefepime Review of Systems Constitutional: Positive for fatigue. Negative for chills and fever. HENT: Negative for ear pain, sore throat and trouble swallowing. Eyes: Negative for visual disturbance. Respiratory: Negative for cough and shortness of breath. Cardiovascular: Negative for chest pain. Gastrointestinal: Negative for abdominal pain. Genitourinary: Negative for dysuria. Musculoskeletal: Negative for back pain. Skin: Negative for rash. Neurological: Positive for syncope and light-headedness. Negative for tremors and headaches. Psychiatric/Behavioral: Negative for confusion. All other systems reviewed and are negative. Vitals:  
 06/03/20 1223 Resp: 20 Temp: 98.2 °F (36.8 °C) SpO2: 95% Weight: 83.9 kg (185 lb) Height: 6' 2\" (1.88 m) Physical Exam 
Vitals signs reviewed. Constitutional:   
   General: He is not in acute distress. HENT:  
   Head: Normocephalic and atraumatic. Mouth/Throat:  
   Mouth: Mucous membranes are moist.  
   Pharynx: Oropharynx is clear. Cardiovascular:  
   Rate and Rhythm: Normal rate and regular rhythm. Heart sounds: Normal heart sounds. Pulmonary:  
   Effort: Pulmonary effort is normal.  
   Breath sounds: Normal breath sounds. Abdominal:  
   Tenderness: There is no abdominal tenderness. There is no guarding or rebound. Musculoskeletal: Normal range of motion. Skin: 
   General: Skin is warm and dry. Capillary Refill: Capillary refill takes less than 2 seconds. Neurological:  
   General: No focal deficit present. Mental Status: He is alert and oriented to person, place, and time. Psychiatric:     
   Mood and Affect: Mood normal.  
 
  
 
MDM Number of Diagnoses or Management Options Injury of head, initial encounter:  
Lightheadedness:  
Volume depletion:  
Diagnosis management comments: 71 y.o. male with HLD, JOSE, CAD s/p cardiac arrest VFib s/p CABG (2011) c/b sternal would infection and sternectomy, ischemic cardiomyopathy LVEF 15-20%, s/p BiVICD  who underwent LVAD as DT on 11/18/2019 after bridging with Impella 5.0. 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 No focal neurological symptoms. CT negative. Seen by the heart failure service, improved with hydration, reassessment. After patient received 500 cc of fluid he felt much better, was able to ambulate without lightheadedness. We will follow-up as an outpatient with his cardiologist.  Veronica Abbasi to stop a.m. bzd. ED Course as of Jun 03 1436 Wed Jun 03, 2020  
1402 . [NS] ED Course User Index [NS] Eun Arroyo MD  
 
 
Procedures

## 2020-06-03 NOTE — TELEPHONE ENCOUNTER
Spoke to patient's wife, she states understanding of stopping the morning dose of klonopin but continue the evening dose. He needs to increase his fluid intake especially during the summer months.

## 2020-06-03 NOTE — CONSULTS
Abdulaziz Hackett a 71y.o. year old white male with a history of HTN, HLD, JOSE, CAD s/p cardiac arrest VFib s/p CABG (2011) c/b sternal would infection and sternectomy, ischemic cardiomyopathy LVEF 15-20%, s/p BiVICD  who underwent LVAD as DT on 11/18/2019 after bridging with Impella 5.0.  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 He was transferred to [facility] rehab on 1/30 and discharged from rehab on 2/18/2020. He presents today to the ED for syncopal event where he fell backwards and hit his head. ED work up unremarkable, his head CT is negative for acute process, his labs indicate he may have IVVD. Review of Systems General ROS: positive for  - fatigue Respiratory ROS: negative Cardiovascular ROS: no CP, DOUGLAS, edema Gastrointestinal ROS: no abdominal pain, change in bowel habits, or black or bloody stools 
negative Neurological ROS: positive for - dizziness, weakness and fall O: 
Visit Vitals Temp 98.2 °F (36.8 °C) Resp 20 Ht 6' 2\" (1.88 m) Wt 185 lb (83.9 kg) SpO2 95% BMI 23.75 kg/m² VAD interrogation: 
Set Speed 5800rpm 
Flow: 4.9 PI: 3.4 Power: 4.5 Frequent PI events noted, no adverse alarms noted Physical Exam  
Constitutional: He is oriented to person, place, and time. He appears well-developed and well-nourished. No distress. Eyes: Pupils are equal, round, and reactive to light. Neck: Normal range of motion. No JVD present. Cardiovascular: Normal rate, regular rhythm and normal heart sounds. No edema 
+ VAD hum Pulmonary/Chest: Effort normal and breath sounds normal. No respiratory distress. Abdominal: Soft. Bowel sounds are normal. He exhibits no distension. Musculoskeletal: Normal range of motion. Neurological: He is alert and oriented to person, place, and time.   
 
 
A/p: 
 
 Syncope likely due to orthostasis/IVVD Agree with IVF Decrease Klonopin to evening dose only Interrogate AICD Interrogate LVAD Need to increase PO fluid intake Likely will DC home from ED and not require admission Follow up in [facility] closely Patient seen and examined. Data and note reviewed. I have discussed and agree with the plans as noted. 71year old male with a history of NICM s/p LVAD as DT who presented with a syncopal event, likely d/t IVVD. Encouraged patient to liberalize his sodium and fluid intake. PI events on LVAD interrogation are consistent with IVVD. Patient is stable for discharge and will be followed in the [facility]. Thank you for allowing us to participate in your patient's care. Mounika Wu MD, [facility] Chief of Cardiology, [facility] Medical Director Calos Rueda [address], [address] Office [phone] Fax [phone]

## 2020-06-03 NOTE — ED TRIAGE NOTES
Triage:  Pt to the ED via referral of heart failure team due to concerns over continued dizziness for the past week and syncope x 1 this AM.  Pt states this AM experienced a syncopal event causing him to fall backwards striking the back of his head. Pt has existing LVAD in place and is on an anticoagulant. Pt into triage via wheel chair, no acute of distress present.

## 2020-06-03 NOTE — TELEPHONE ENCOUNTER
Received call from patient's wife. Thalia Jules stated patient was starting work with physical therapy and stood up and had a loss of consciousness, fell and hit his head. She states that patient \"is not acting right\". Physical Therapy states that map sitting is 79 and standing drops to 66. After a short while patient walked to another room and had a \"dizzy spell\" and was caught from falling by physical therapist.  Thalia Jules verbalized understanding and agreed to bring patient to ED for head CT. Cha Condon NP made aware. Called ED and spoke with [facility],[place] [id number] and made aware.

## 2020-06-04 NOTE — PROGRESS NOTES
Patient contacted regarding recent discharge and COVID-19 risk. Discussed COVID-19 related testing which was not done at this time. Care Transition Nurse/ Ambulatory Care Manager contacted the patient by telephone to perform post discharge assessment. Verified name and  with patient as identifiers. Patient has following risk factors of: heart failure. CTN/ACM reviewed discharge instructions, medical action plan and red flags related to discharge diagnosis. Reviewed and educated them on any new and changed medications related to discharge diagnosis. Advised obtaining a 90-day supply of all daily and as-needed medications. Education provided regarding infection prevention, and signs and symptoms of COVID-19 and when to seek medical attention with patient who verbalized understanding. Discussed exposure protocols and quarantine from [address] you at higher risk for severe illness  and given an opportunity for questions and concerns. The patient agrees to contact the COVID-19 hotline [phone] or PCP office for questions related to their healthcare. CTN/ACM provided contact information for future reference. From CDC: Are you at higher risk for severe illness?  Wash your hands often.  Avoid close contact (6 feet, which is about two arm lengths) with people who are sick.  Put distance between yourself and other people if COVID-19 is spreading in your community.  Clean and disinfect frequently touched surfaces.  Avoid all cruise travel and non-essential air travel.  Call your healthcare professional if you have concerns about COVID-19 and your underlying condition or if you are sick. For more information on steps you can take to protect yourself, see CDC's How to Protect Yourself Plan for follow-up call in 7-14 days based on severity of symptoms and risk factors.

## 2020-06-09 NOTE — PATIENT INSTRUCTIONS
Medication changes:    Stop taking the Entresto. Stop taking the multivitamin. Continue clonazepam at bedtime for one more week. Stop taking it on Tuesday, June 16th. Notify us if you develop tremors or other negative effects after stopping it. Please take this to your pharmacy to notify them of the change in medications. Testing Ordered:    No tests today. Other Recommendations:     Continue to drink plenty of fluids. Ensure your drinking an adequate amount of water with a goal of 2L of fluid daily. Your urine should be clear and light yellow straw colored. If your blood pressure begins to consistently run below 90/60 and/or you begin to experience dizziness or lightheadedness, please contact the [facility] at [phone]. Follow up 1 month with [facility]      Please monitor your blood pressures daily prior to medications and 2 hours after taking medications. Bring a written record of your blood pressures to your next appointment. Please monitor your weights daily upon waking and after using the bathroom. Keep a written records of your weights and bring to your next appointment. If you have a weight gain of 3 or more pounds overnight OR 5 or more pounds in one week please contact our office. Thank you for allowing us the privilege of being a part of your healthcare team! Please do not hesitate to contact our office at [phone] with any questions or concerns. Heart Failure Patients and COVID-19  March 31, 2020 in Heart Failure Research News, HFSA News     A Statement from the [address]) wants to ensure that heart failure patients are appropriately informed during the novel Coronavirus COVID-19 pandemic.      COVID-19 symptoms vary among infected people and can include cough, fever, shortness of breath, muscle aches, profound fatigue, loss of sense of smell and taste, and diarrhea. Not every infected person will have symptoms which is why social distancing is imperative. Most people have only mild symptoms, but others have severe symptoms that require hospitalization and occasionally intensive care. Because you are a patient with a heart failure, you are at higher risk of getting very sick if you contract Coronavirus and, therefore, it is important to practice good hand hygiene, social distancing, and staying at home as much as possible. If you are experiencing symptoms of COVID-19, please call your primary healthcare clinician. For your safety and the safety of others, and to reduce potential exposures to the Coronavirus, please do not go to an urgent care clinic or emergency room for non-urgent or non-emergency issues unless you have been instructed to do so by your primary healthcare clinician. However, if you have life-threatening symptoms like difficulty breathing, call 911. We want to reduce the spread of the Coronavirus as much as possible and make sure our healthcare resources are not overwhelmed with non-urgent cases. You may have noticed that your healthcare clinicians have converted your in-person appointments to telephone or video calls, and some hospitals are not allowing visitors. The goal is to keep patients from alma the Coronavirus and to limit the number of healthcare workers in the hospital. If you are sick and need to be seen or get lab testing, you should still do so; otherwise, you can help by     1. Learning how to use your smartphone or computer to participate in telehealth video visits  2. Keeping track of missed visits and studies and working with your team to reschedule them once social distancing measures are relaxed     Also, do not stop any of your medications unless instructed by your healthcare team to do so.  It is important that you have an adequate supply of your heart failure medications and that you request extended duration of supplies and refills from your providers during these times. We have a lot to learn about COVID-19 and currently there are several ongoing clinical trials to discover therapies that might help treat the infection and vaccines that can prevent the infection. The [facility] and other scientific institutions are working hard, with our patients in mind, to get through this pandemic as quickly as possible. Finally, we recommend that you get your information from trusted, professional healthcare sources including national societies like the [employer], federal agencies like the [employer] for [employer], and your local hospitals and health departments. Please access updated patient information on the [facility] [phone]) [facility]. We wish you safety and health during this challenging time.

## 2020-06-09 NOTE — PROGRESS NOTES
[address] [place] [address] [address] [place] [address] Clinic Note      Patient name: Thu Castrejon  Patient : 1950  Patient MRN: [id number]  Date of service: 20    CHIEF COMPLAINT:  Chief Complaint   Patient presents with    Follow-up     LVAD       HPI: Glory brenner 71y.o. year old white male with a history of HTN, HLD, JOSE, CAD s/p cardiac arrest VFib s/p CABG () c/b sternal would infection and sternectomy, ischemic cardiomyopathy LVEF 15-20%, s/p BiVICD  who underwent LVAD as DT on 2019 after bridging with Impella 5.0.  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 He was transferred to [address] [place] [address] rehab on  and discharged from rehab on 2020. Echo Cortes was seen  for an LVAD follow up visit. Due to fever, chills, and hypotension, he was referred to the ED, evaluated and admitted to [facility]. He underwent treatment for recurrent pseudomonas bacteremia due to UTI. His Cipro was resumed and he was discharged home in stable condition. Echo Cortes presented to [facility] ED on  after becoming dizzy, falling and hitting his head. Head CT was negative. He was noted to be dehydrated on assessment and given 500 mL NS bolus with improvement in dizziness. He presents today for office follow up. He continues to complain of dizziness and dyspnea. CardioMEMS reading from yesterday shows PAd of 11 mmHg - of note, it was 9 mmHg during his syncopal episode. He has felt much better with the decrease in clonazepam, but still reports fatigue which he attributes to the [facility]. Dolphus reports feeling \"over medicated\". He denies CP, palpitations, nausea, vomiting, abdominal distention, bowel or bladder changes. He sees his urologist on Thursday.  d    Assessment/Plan:   NYHA Class II    ICM - Stage D,  LVEF 15%, NYHA Class IV improved to NYHA Class II s/p HM3 LVAD as DT on 11/18/19 with Impella 5.0 as bridge to LVAD   Continue LVAD speed at 5800 rpm    TTE 4/20 shows large LVIDd, 6.84 cm, RVIDd 3.06 cm, TAPSE 1.15 cm, severely elevated CVP   Repeat TTE in October    Continue digoxin 0.125 mg PO daily to support RV through infection    Intolerant to BB d/t RV failure   Discontinue Entresto due to persistent orthostasis despite increased fluid intake    Intolerant to spironolactone due to IVVD and hyponatremia   Monitor CardioMEMs readings - 11 mmHg yesterday (6/8/20)    Dr. Maria Teresa Phillip would like to pull sternal wire once stable - will need to wait until active infection has resolved     Return in 1 month for follow up with Dr. Peter Olivares     HTN, goal MAP 70-90 mmHg   Discontinue Entresto due to orthostasis    MAPs on LUE only d/t previous right axillary Impella cannulation     Sepsis, related to recurrent pseudomonal infection   BC 4/20 - 2/4 bottles growing pseudomonas   UA suspicious for UTI; but urine culture shows < 1,000 CFU    Continue cipro indefinitely    Chronic anticoagulation, goal INR 1.5-2.0   Continue warfarin and ASA    See anticoag tracker     MSSA drive line infection   Repeat culture from 5/7/20- MSSA   Continue Augmentin indefinitely    Gentamicin to exit site with daily dressing changes   CT x 3 reviewed by Dr. Maria Teresa Phillip - small fluid collection adjacent to previous real drain tract has resolved   Continue daily dressing changes   Trend CTs     VT - s/p [facility] BiVICD   AICD shock 5/16- sent to EP   Follow up with EP    COPD   Albuterol PRN    JOSE   Encouraged patient to be compliant with CPAP therapy   Follow up with Dr. Vivi Serrano increased protein intake    Stop multivitamin due to adequate PO intake    Tremors   Resolved   Continue keppra 250 mg BID   Decreased clonazepam to daily from BID without adverse effect, and pt notes improvement in mentation    Continue clonazepam 0.5 mg qHS x 1 week then discontinue   BPH   On finasteride and tamsulosin   Urology consult appreciated   Sees Urology on Thursday     Depression   On Effexor -  mg daily     Persistently elevated CEA   Persistently elevated CEA; PET scan shows increased activity RML of lung   Oncology consult appreciated - PET not suggestive of malignancy     Orthostatic Hypotension   Persistent    Discontinue Entresto    Encourage use of CAROL hose     PAF   Digoxin 0.125 mg PO daily   Intolerant of BB d/t RV failure   On warfarin    Debility   Improving   Continue PT/OT- 2x/week at home        CARDIAC IMAGING:  Chest CTA- no outflow graft occlusion   Pet Scan equivocal for amyloid    04/20/20   ECHO ADULT COMPLETE 04/21/2020 4/21/2020    Narrative · Image quality for this study was technically difficult. · Normal wall thickness. Severely dilated left ventricle. Severe systolic   function. Estimated left ventricular ejection fraction is <15%. LVAD   present. 5800 rpm  · Dilated left atrium. · RV Not well visualized. Mildly reduced systolic function. · Mild aortic valve regurgitation is present. Aortic valve does not open   in systole. · Mild mitral valve regurgitation is present. · Mild to moderate tricuspid valve regurgitation is present. · Severely elevated central venous pressure (15+ mmHg); IVC diameter is   larger than 21 mm and collapses less than 50% with respiration. Signed by: Liz Weston MD         10/25/19   ECHO ADULT COMPLETE 01/29/2020 1/29/2020    Narrative · Severely dilated left ventricle. Upper normal wall thickness. Severe   systolic dysfunction. Estimated left ventricular ejection fraction is   <15%. Left ventricular diastolic dysfunction. · Mitral valve thickening. Minimal mitral valve prolapse of the anterior   mitral valve leaflet. Trace mitral valve regurgitation is present. · Mild tricuspid valve regurgitation is present. · Mildly dilated right ventricle.  Moderately reduced RV systolic function   (TAPSE 1.3 cm, RV S' 6 cm/s). Pacer/ICD present. · Mildly biatrial enlargement  · Severely elevated central venous pressure (15+ mmHg); IVC diameter is   larger than 21 mm and collapses less than 50% with respiration. · LVAD inflow cannula spectral Doppler tracings not obtained. Signed by: Nando Hearn MD           OTHER IMAGING:  Head CT negative for acute process  EEG suggestive of mild generalized encephalopathic process, possibly related to underlying structural brain injury vs metabolic abnormality    Review of Systems   Constitutional: Negative for chills, fever and malaise/fatigue. Respiratory: Negative for cough, sputum production and shortness of breath. Cardiovascular: Negative for chest pain, palpitations, leg swelling and PND.        + AICD shock    Gastrointestinal: Negative for blood in stool, heartburn, nausea and vomiting. Musculoskeletal: Negative. Neurological: Positive for weakness. Negative for dizziness and headaches. Endo/Heme/Allergies: Bruises/bleeds easily. Psychiatric/Behavioral: Negative. Visit Vitals  BP (!) 78/0 (BP 1 Location: Right arm, BP Patient Position: Sitting)   Pulse (!) 58   Temp 98.2 °F (36.8 °C) (Oral)   Resp 20   Ht 6' 2\" (1.88 m)   Wt 184 lb (83.5 kg)   SpO2 97%   BMI 23.62 kg/m²     LVAD   Pump Speed (RPM): 5800  Pump Flow (LPM): 5.4  PI (Pulsitility Index): 3.1  Power: 4.6     Results for orders placed or performed in visit on 06/09/20   [place] INTERROGATION VAD IN [place] W/PHYS/QHP ANALYSIS    Impression    Alarm history reviewed. Please see VAD flow sheet for readings. Occasional PI events but no adverse alarms noted. Settings reviewed, but no changes made. Physical Exam   Constitutional: He is oriented to person, place, and time. He appears well-developed and well-nourished. No distress. Eyes: Pupils are equal, round, and reactive to light. Neck: Normal range of motion. No JVD present.    Cardiovascular: Normal rate, regular rhythm and normal heart sounds. + VAD hum   Pulmonary/Chest: Effort normal and breath sounds normal. No respiratory distress. Abdominal: Soft. Bowel sounds are normal. He exhibits no distension. Musculoskeletal: Normal range of motion. General: Edema present. Comments: +1BLE- improved    Neurological: He is alert and oriented to person, place, and time. Skin: Skin is warm and dry. Drive line site- + erythema, edema, mod purulent drainage    Psychiatric: He has a normal mood and affect. Vitals reviewed. No results found for this visit on 06/09/20.       PAST MEDICAL HISTORY:  Past Medical History:   Diagnosis Date    Degenerative disc disease, lumbar     Heart failure ([facility]     High cholesterol     Hypertension     Paroxysmal atrial fibrillation ([facility] 4/2/2019    Spinal stenosis        PAST SURGICAL HISTORY:  Past Surgical History:   Procedure Laterality Date    COLONOSCOPY Left 11/11/2019    COLONOSCOPY at bedside performed by Kaylyn Rodriguez MD at [address] HX APPENDECTOMY      [address] GRAFT      triple    HX HEART ASSIST DEVICE Left 10/29/2019    Impella 5.0    HX HEART ASSIST DEVICE Left 11/18/2019    HeartMate 3    HX HERNIA REPAIR      HX IMPLANTABLE CARDIOVERTER DEFIBRILLATOR      HX THROMBECTOMY Left 11/25/2019    Left brachial thrombectomy    [place] CARDIOVERSION ELECTIVE ARRHYTHMIA EXTERNAL N/A 6/10/2019    EP CARDIOVERSION performed by Mazin Ng MD at [place] [address], [facility] [address] CATH LAB    [place] CARDIOVERSION ELECTIVE ARRHYTHMIA EXTERNAL N/A 6/18/2019    EP CARDIOVERSION performed by Eva Phan MD at [place] [address], [facility] CATH LAB    [place] INSJ/RPLCMT PERM DFB W/TRNSVNS LDS 1/DUAL [address] N/A 6/21/2019    INSERT ICD BIV MULTI performed by Jocelyn Carbone MD at [place] [address], [facility] CATH LAB    [place] TCAT IMPL WRLS P-ART PRS SNR L-T HEMODYN MNTR N/A 9/18/2019    IMPLANT HEART FAILURE MONITORING DEVICE performed by Fabian Neal MD at [place] [address], [facility] CATH LAB       FAMILY HISTORY:  Family History   Problem Relation Age of Onset    Lung Disease Mother     Hypertension Mother     Arthritis-osteo Mother     Heart Disease Mother     Heart Disease Father     Diabetes Father        SOCIAL HISTORY:  Social History     Socioeconomic History    Marital status:      Spouse name: Not on file    Number of children: Not on file    Years of education: Not on file    Highest education level: Not on file   Tobacco Use    Smoking status: Former Smoker     Last attempt to quit: 2010     Years since quittin.5    Smokeless tobacco: Never Used   Substance and Sexual Activity    Alcohol use: Not Currently     Comment: rarely    Drug use: Never   Other Topics Concern       ALLERGY:  Allergies   Allergen Reactions    Cefepime Other (comments)     myoclonus        CURRENT MEDICATIONS:    Current Outpatient Medications:     digoxin (LANOXIN) 0.125 mg tablet, Take 1 Tab by mouth every other day., Disp: 90 Tab, Rfl: 2    magnesium oxide (MAG-OX) 400 mg tablet, Take 2 Tabs by mouth two (2) times a day., Disp: 360 Tab, Rfl: 3    ciprofloxacin HCl (CIPRO) 750 mg tablet, Take 1 Tab by mouth two (2) times a day., Disp: 60 Tab, Rfl: 3    clonazePAM (KlonoPIN) 0.5 mg tablet, Take 0.5 Tabs by mouth two (2) times a day. Max Daily Amount: 0.5 mg., Disp: 30 Tab, Rfl: 2    amoxicillin-clavulanate (AUGMENTIN) 875-125 mg per tablet, Take 1 Tab by mouth two (2) times a day., Disp: 60 Tab, Rfl: 3    warfarin (COUMADIN) 1 mg tablet, Take 3 Tabs by mouth daily.  May take additional tab as directed by provider., Disp: 200 Tab, Rfl: 3    budesonide (PULMICORT) 0.5 mg/2 mL nbsp, 500 mcg by Nebulization route every evening., Disp: , Rfl:     albuterol-ipratropium (DUO-NEB) 2.5 mg-0.5 mg/3 ml nebu, 3 mL by Nebulization route nightly., Disp: , Rfl:     sacubitriL-valsartan (Entresto) 24-26 mg tablet, Take 1 Tab by mouth two (2) times a day., Disp: 180 Tab, Rfl: 3    tamsulosin (Flomax) 0.4 mg capsule, Take 0.8 mg by mouth nightly., Disp: , Rfl:     gentamicin (GARAMYCIN) 0.1 % topical ointment, Apply to exit site daily. , Disp: 30 g, Rfl: 0    aspirin delayed-release 81 mg tablet, Take 1 Tab by mouth daily. , Disp: 90 Tab, Rfl: 3    venlafaxine-SR (EFFEXOR-XR) 150 mg capsule, Take 1 Cap by mouth daily (with breakfast). , Disp: 90 Cap, Rfl: 3    therapeutic multivitamin (THERAGRAN) tablet, Take 1 Tab by mouth daily. , Disp: 90 Tab, Rfl: 3    levETIRAcetam (KEPPRA) 250 mg tablet, Take 1 Tab by mouth two (2) times a day., Disp: 180 Tab, Rfl: 3    L. acidoph & paracasei- S therm- Bifido (TIAN-Q/RISAQUAD) 8 billion cell cap cap, Take 1 Cap by mouth daily. , Disp: 90 Cap, Rfl: 3    finasteride (PROSCAR) 5 mg tablet, Take 1 Tab by mouth daily. , Disp: 90 Tab, Rfl: 3    cholecalciferol (VITAMIN D3) (1000 Units /25 mcg) tablet, Take 2 Tabs by mouth daily. , Disp: 180 Tab, Rfl: 3    warfarin (COUMADIN) 3 mg tablet, Take 1 Tab by mouth daily.  May take additional tab as directed by provider., Disp: 120 Tab, Rfl: 3    albuterol (PROVENTIL HFA, VENTOLIN HFA, PROAIR HFA) 90 mcg/actuation inhaler, Take 2 Puffs by inhalation every six (6) hours as needed for Wheezing., Disp: 1 Inhaler, Rfl: 3      Thank you for letting us see him with you,    Jhony Mayes NP  [facility]  [address]  Office [phone]  Fax [phone]

## 2020-06-09 NOTE — PROGRESS NOTES
Spoke with patient in clinic. Patient verbalized understanding of Coumadin dosing and to repeat INR on Friday 6/12/20.

## 2020-06-11 NOTE — TELEPHONE ENCOUNTER
Telephone Call RE:  Lab Reminder      Outcome:     [x] Patient verbalizes understanding    [] Unable to reach   [] Left message              []       Jalen Sandoval

## 2020-06-12 NOTE — PROGRESS NOTES
Spoke with patient's wife. Caleb Prater verbalized understanding of Coumadin dosing and to repeat a full set of labs with [place] in 2 weeks.

## 2020-06-18 NOTE — PROGRESS NOTES
Patient resolved from Transition of Care episode on 6/18/2020 Discussed COVID-19 related testing which was not done at this time. Patient/family has been provided the following resources and education related to COVID-19:  
           
          Signs, symptoms and red flags related to COVID-19 CDC exposure and quarantine guidelines Conduit exposure contact - [phone] Contact for their local Department of Health Patient currently reports that the following symptoms have improved:  no new symptoms. No further outreach scheduled with this CTN/ACM/LPN/HC/ MA. Episode of Care resolved. Patient has this CTN/ACM/LPN/HC/MA contact information if future needs arise.

## 2020-06-22 NOTE — PROGRESS NOTES
Spoke with patient's wife. Annemarie Chung verbalized understanding of Coumadin dosing and to repeat INR in 1 week.

## 2020-06-26 NOTE — TELEPHONE ENCOUNTER
Telephone Call RE:  Lab Reminder      Outcome:     [x] Patient verbalizes understanding    [] Unable to reach   [] Left message              []       Rupali Patino

## 2020-07-01 NOTE — PROGRESS NOTES
Spoke with patient's wife. Patrick Joseph verbalized understanding of Coumadin dosing and to repeat INR in 1 week.

## 2020-07-02 PROBLEM — D64.9 ANEMIA: Status: ACTIVE | Noted: 2020-01-01

## 2020-07-02 PROBLEM — R31.9 HEMATURIA: Status: ACTIVE | Noted: 2020-01-01

## 2020-07-02 NOTE — ED PROVIDER NOTES
70-year-old male with coronary artery disease, congestive heart failure, and LVAD  on Coumadin presents to the emergency room for evaluation of blood in his urine. Patient states for the past 3 weeks he has had varying amounts of blood in the urine. He reports associated shortness of breath fatigue mild dizziness. No abdominal pain nausea or vomiting. No dark stool black stool or bloody stool. He does report some muscle aches. No chest pain. No cough, congestion, runny nose or sore throat. Patient had his INR checked and was informed it was 6.0. Patient in the ER for further evaluation. Social hx Occasional alcohol Nonsmoker The history is provided by the patient. No  was used. Past Medical History:  
Diagnosis Date  Degenerative disc disease, lumbar  Heart failure ([address]  High cholesterol  Hypertension  Paroxysmal atrial fibrillation ([facility] [address] 4/2/2019  Spinal stenosis Past Surgical History:  
Procedure Laterality Date  COLONOSCOPY Left 11/11/2019 COLONOSCOPY at bedside performed by Neal Mcmahon MD at [facility]  HX CORONARY ARTERY BYPASS GRAFT    
 triple  HX HEART ASSIST DEVICE Left 10/29/2019 Impella 5.0  
 HX HEART ASSIST DEVICE Left 11/18/2019 HeartMate 3  
 HX HERNIA REPAIR    
 HX IMPLANTABLE CARDIOVERTER DEFIBRILLATOR  HX THROMBECTOMY Left 11/25/2019 Left brachial thrombectomy  UT CARDIOVERSION ELECTIVE ARRHYTHMIA EXTERNAL N/A 6/10/2019 EP CARDIOVERSION performed by Christiane Serna MD at [place] [address], [facility] CATH LAB  UT CARDIOVERSION ELECTIVE ARRHYTHMIA EXTERNAL N/A 6/18/2019 EP CARDIOVERSION performed by Errol Irving MD at [place] [address], [facility] [address] CATH LAB  UT INSJ/RPLCMT PERM DFB W/TRNSVNS LDS 1/DUAL CHMBR N/A 6/21/2019 INSERT ICD BIV MULTI performed by Leah Davis MD at [place] [address], [facility] CATH LAB  UT TCAT IMPL WRLS P-ART PRS SNR L-T HEMODYN MNTR N/A 9/18/2019 IMPLANT HEART FAILURE MONITORING DEVICE performed by Gavin Gorman MD at [address], [facility] [facility] Family History:  
Problem Relation Age of Onset  Lung Disease Mother  Hypertension Mother [facility] Arthritis-osteo Mother  Heart Disease Mother  Heart Disease Father  Diabetes Father Social History Socioeconomic History  Marital status:  Spouse name: Not on file  Number of children: Not on file  Years of education: Not on file  Highest education level: Not on file Occupational History  Not on file Social Needs  Financial resource strain: Not on file  Food insecurity Worry: Not on file Inability: Not on file  Transportation needs Medical: Not on file Non-medical: Not on file Tobacco Use  Smoking status: Former Smoker Last attempt to quit: 2010 Years since quittin.5  Smokeless tobacco: Never Used Substance and Sexual Activity  Alcohol use: Not Currently Comment: rarely  Drug use: Never  Sexual activity: Not on file Lifestyle  Physical activity Days per week: Not on file Minutes per session: Not on file  Stress: Not on file Relationships  Social connections Talks on phone: Not on file Gets together: Not on file Attends Evangelical service: Not on file Active member of club or organization: Not on file Attends meetings of clubs or organizations: Not on file Relationship status: Not on file  Intimate partner violence Fear of current or ex partner: Not on file Emotionally abused: Not on file Physically abused: Not on file Forced sexual activity: Not on file Other Topics Concern [address] Service Not Asked  Blood Transfusions Not Asked  Caffeine Concern Not Asked  Occupational Exposure Not Asked [place] Hazards Not Asked  Sleep Concern Not Asked  Stress Concern Not Asked  Weight Concern Not Asked  Special Diet Not Asked  Back Care Not Asked  Exercise Not Asked  Bike Helmet Not Asked  Seat Belt Not Asked  Self-Exams Not Asked Social History Narrative  Not on file ALLERGIES: Cefepime Review of Systems Constitutional: Positive for fatigue. Negative for chills and fever. HENT: Negative for congestion and rhinorrhea. Respiratory: Negative for cough, chest tightness and shortness of breath. Cardiovascular: Negative for chest pain and leg swelling. Gastrointestinal: Negative for abdominal pain, nausea and vomiting. Genitourinary: Positive for hematuria. Negative for difficulty urinating and dysuria. Musculoskeletal: Negative for back pain and neck pain. Skin: Negative for color change and rash. Neurological: Positive for dizziness, weakness and light-headedness. All other systems reviewed and are negative. Vitals:  
 07/02/20 1757 Pulse: 82 SpO2: 97% Physical Exam 
Vitals signs and nursing note reviewed. Constitutional:   
   General: He is not in acute distress. Appearance: He is not toxic-appearing. HENT:  
   Head: Normocephalic and atraumatic. Eyes:  
   Extraocular Movements: Extraocular movements intact. Pupils: Pupils are equal, round, and reactive to light. Neck: Musculoskeletal: Normal range of motion and neck supple. Cardiovascular:  
   Rate and Rhythm: Normal rate. Pulmonary:  
   Effort: Pulmonary effort is normal.  
   Breath sounds: Normal breath sounds. Abdominal:  
   General: Abdomen is flat. There is no distension. Palpations: There is no mass. Tenderness: There is no abdominal tenderness. There is no guarding or rebound. Hernia: No hernia is present. Genitourinary: 
   Comments: Rectal exam: No hemorrhoids or fissures No natty blood. Brown stool present Chaperone: ANGELINA Russo RN 
Musculoskeletal: Normal range of motion. Skin: 
   General: Skin is warm and dry. Neurological:  
   General: No focal deficit present. Mental Status: He is alert and oriented to person, place, and time. Psychiatric:     
   Mood and Affect: Mood normal.     
   Behavior: Behavior normal.     
   Thought Content: Thought content normal.     
   Judgment: Judgment normal.  
 
  
 
MDM Number of Diagnoses or Management Options Anemia, unspecified type:  
Current use of long term anticoagulation:  
Dizziness:  
Hematuria, unspecified type: LVAD (left ventricular assist device) present [facility]):  
Diagnosis management comments: 77-year-old male presenting to the emergency room for blood in urine. Patient on Coumadin. Denies dark stool black stool or bloody stool. He is an LVAD patient. Abdomen soft and nontender. Lungs are clear. Plan: Labs UA consult LVAD team 
 
9:06 PM 
I spoke with Damion Zepeda, LVAD team. Would like 1-2 units of blood. Would like patient admitted via hospitalist. They will see in morning. 9:10 PM 
Patient is being admitted to the hospital.  The results of their tests and reasons for their admission have been discussed with them and/or available family. They convey agreement and understanding for the need to be admitted and for their admission diagnosis. Consultation will be made now with the inpatient physician for hospitalization. Amount and/or Complexity of Data Reviewed Discuss the patient with other providers: yes (ER attending-Casimiro) Patient Progress Patient progress: stable Procedures Hospitalist Perfect Serve for Admission 9:07 PM 
 
ED Room Number: ER07/07 Patient Name and age:  Anand Singh 71 y.o.  male Working Diagnosis: 1. LVAD (left ventricular assist device) present ([address] 2. Anemia, unspecified type 3. Hematuria, unspecified type 4. Current use of long term anticoagulation 5. Dizziness COVID-19 Suspicion:  no 
 
Code Status:  Full Code Readmission: no 
Isolation Requirements:  yes Recommended Level of Care:  ICU Department:[facility] Adult ED - [phone] Other:  LVAD patient. On coumadin. 3 weeks of hematuria. Hgb 6.2. symptomatic with dizziness, shortness of breath. Pt case including HPI, PE, and all available lab and radiology results has been discussed with attending physician. Opportunity to evaluate patient has been provided to ER attending.

## 2020-07-02 NOTE — TELEPHONE ENCOUNTER
Reviewed labs from 7/2. Notable for anemia, Hgb 6.0 in setting of hematuria and weakness. Advised patient present to [facility] ED for further evaluation; patient and wife agree.

## 2020-07-02 NOTE — ED TRIAGE NOTES
LVAD patient says he is here because his blood work was drawn today and he dropped considerably. He says he has had intermittent blood in his urine. He says he has also had increasing difficulty in rehab with weakness.

## 2020-07-03 NOTE — CONSULTS
[facility] Note Patient name: Dmitri Hargrove Patient : 1950 Patient MRN: [id number] Date of service: 20 CHIEF COMPLAINT: 
Chief Complaint Patient presents with  Blood in Urine HPI: Shaka Kiser is a 71y.o. year old white male with a history of HTN, HLD, JOSE, CAD s/p cardiac arrest VFib s/p CABG () c/b sternal would infection and sternectomy, ischemic cardiomyopathy LVEF 15-20%, s/p BiVICD  who underwent LVAD as DT on 2019 after bridging with Impella 5.0.  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 He was transferred to [address] [place] [address] rehab on  and discharged from rehab on 2020. Kenyatta Arteaga was seen  for an LVAD follow up visit. Due to fever, chills, and hypotension, he was referred to the ED, evaluated and admitted to [facility]. He underwent treatment for recurrent pseudomonas bacteremia due to UTI. His Cipro was resumed and he was discharged home in stable condition. Mr. Cailin Bello presented to ED after routine labs showed a hgb drop from 10 to 6.8, he states he has had hematuria on and off for 2 weeks. He did note that he was more fatigued when working with PT at home. He denies other acute complaints. Assessment/Plan: NYHA Class II Acute blood loss anemia due to hematuria Transfuse to keep hgb> 7 Urology consult Continuous bladder irrigation Hold AC for now ICM - Stage D,  LVEF 15%, NYHA Class IV improved to NYHA Class II s/p HM3 LVAD as DT on 19 with Impella 5.0 as bridge to LVAD Continue LVAD speed at 5800 rpm  
 TTE  shows large LVIDd, 6.84 cm, RVIDd 3.06 cm, TAPSE 1.15 cm, severely elevated CVP Continue digoxin 0.125 mg PO daily to support RV through infection Intolerant to BB d/t RV failure Discontinue Entresto due to persistent orthostasis despite increased fluid intake Intolerant to spironolactone due to IVVD and hyponatremia Monitor CardioMEMs readings HTN, goal MAP 70-90 mmHg Discontinue Entresto due to orthostasis MAPs on LUE only d/t previous right axillary Impella cannulation H/o pseudomonal infection [facility] 4/20 - 2/4 bottles growing pseudomonas UA negative for bacteria Continue cipro indefinitely Chronic anticoagulation, goal INR 1.5-2.0 Hold ASA and coumadin due to hematuria for now Monitor MSSA drive line infection Repeat culture from 5/7/20- MSSA Continue Augmentin indefinitely Gentamicin to exit site with daily dressing changes CT x 3 reviewed by Dr. Courtney Lynn - small fluid collection adjacent to previous real drain tract has resolved Continue daily dressing changes Trend CTs Tremors Resolved Continue keppra 250 mg BID 
  weaned off clonazepam  
 
BPH On finasteride and tamsulosin Urology consult Depression On Effexor -  mg daily Persistently elevated CEA Persistently elevated CEA; PET scan shows increased activity RML of lung Oncology consult appreciated - PET not suggestive of malignancy Orthostatic Hypotension Persistent Discontinue Severino Douglass Encourage use of CAROL hose PAF Digoxin 0.125 mg PO daily Intolerant of BB d/t RV failure On warfarin Dig level tomorrow Debility Improving Continue PT/OT- 2x/week at home All other care per primary CARDIAC IMAGING: 
Chest CTA- no outflow graft occlusion Pet Scan equivocal for amyloid 04/20/20 ECHO ADULT COMPLETE 04/21/2020 4/21/2020 Narrative · Image quality for this study was technically difficult. · Normal wall thickness. Severely dilated left ventricle. Severe systolic  
function. Estimated left ventricular ejection fraction is <15%. LVAD  
present. 5800 rpm 
· Dilated left atrium. · RV Not well visualized. Mildly reduced systolic function. · Mild aortic valve regurgitation is present. Aortic valve does not open  
in systole. · Mild mitral valve regurgitation is present. · Mild to moderate tricuspid valve regurgitation is present. · Severely elevated central venous pressure (15+ mmHg); IVC diameter is  
larger than 21 mm and collapses less than 50% with respiration. Signed by: Jere Levine MD  
 
 
 
10/25/19 ECHO ADULT COMPLETE 01/29/2020 1/29/2020 Narrative · Severely dilated left ventricle. Upper normal wall thickness. Severe  
systolic dysfunction. Estimated left ventricular ejection fraction is  
<15%. Left ventricular diastolic dysfunction. · Mitral valve thickening. Minimal mitral valve prolapse of the anterior  
mitral valve leaflet. Trace mitral valve regurgitation is present. · Mild tricuspid valve regurgitation is present. · Mildly dilated right ventricle. Moderately reduced RV systolic function (TAPSE 1.3 cm, RV S' 6 cm/s). Pacer/ICD present. · Mildly biatrial enlargement · Severely elevated central venous pressure (15+ mmHg); IVC diameter is  
larger than 21 mm and collapses less than 50% with respiration. · LVAD inflow cannula spectral Doppler tracings not obtained. Signed by: Neha Borges MD  
 
  
  
OTHER IMAGING: 
Head CT negative for acute process EEG suggestive of mild generalized encephalopathic process, possibly related to underlying structural brain injury vs metabolic abnormality Review of Systems Constitutional: Negative for chills, fever and malaise/fatigue. Respiratory: Negative for cough, sputum production and shortness of breath. Cardiovascular: Negative for chest pain, palpitations, leg swelling and PND. + AICD shock Gastrointestinal: Negative for blood in stool, heartburn, nausea and vomiting. Genitourinary: Positive for hematuria. Musculoskeletal: Negative. Neurological: Positive for weakness. Negative for dizziness and headaches. Endo/Heme/Allergies: Bruises/bleeds easily. Psychiatric/Behavioral: Negative. Visit Vitals Pulse 94 Temp 98.2 °F (36.8 °C) Resp 20 SpO2 95% LVAD Pump Speed (RPM): 5800 Pump Flow (LPM): 4.7 PI (Pulsitility Index): 3.9 Power: 4.5 Results for orders placed or performed during the hospital encounter of 07/02/20 URINE CULTURE HOLD SAMPLE Result Value Ref Range Urine culture hold Urine on hold in Microbiology dept for 2 days. If unpreserved urine is submitted, it cannot be used for addtional testing after 24 hours, recollection will be required. CBC WITH AUTOMATED DIFF Result Value Ref Range WBC 3.7 (L) 4.1 - 11.1 K/uL  
 RBC 1.96 (L) 4.10 - 5.70 M/uL HGB 6.2 (L) 12.1 - 17.0 g/dL HCT 20.5 (L) 36.6 - 50.3 % .6 (H) 80.0 - 99.0 FL  
 MCH 31.6 26.0 - 34.0 PG  
 MCHC 30.2 30.0 - 36.5 g/dL  
 RDW 17.8 (H) 11.5 - 14.5 % PLATELET 870 219 - 240 K/uL MPV 8.8 (L) 8.9 - 12.9 FL  
 NRBC 0.0 0  WBC ABSOLUTE NRBC 0.00 0.00 - 0.01 K/uL NEUTROPHILS 72 32 - 75 % LYMPHOCYTES 16 12 - 49 % MONOCYTES 8 5 - 13 % EOSINOPHILS 3 0 - 7 % BASOPHILS 1 0 - 1 % IMMATURE GRANULOCYTES 0 0.0 - 0.5 % ABS. NEUTROPHILS 2.7 1.8 - 8.0 K/UL  
 ABS. LYMPHOCYTES 0.6 (L) 0.8 - 3.5 K/UL  
 ABS. MONOCYTES 0.3 0.0 - 1.0 K/UL  
 ABS. EOSINOPHILS 0.1 0.0 - 0.4 K/UL  
 ABS. BASOPHILS 0.0 0.0 - 0.1 K/UL  
 ABS. IMM. GRANS. 0.0 0.00 - 0.04 K/UL  
 DF SMEAR SCANNED    
 RBC COMMENTS ANISOCYTOSIS 1+ 
    
 RBC COMMENTS MACROCYTOSIS 
1+ METABOLIC PANEL, COMPREHENSIVE Result Value Ref Range Sodium 136 136 - 145 mmol/L Potassium 4.1 3.5 - 5.1 mmol/L Chloride 107 97 - 108 mmol/L  
 CO2 23 21 - 32 mmol/L Anion gap 6 5 - 15 mmol/L Glucose 107 (H) 65 - 100 mg/dL BUN 19 6 - 20 MG/DL  Creatinine 1.36 (H) 0.70 - 1.30 MG/DL  
 BUN/Creatinine ratio 14 12 - 20    
 GFR est AA >60 >60 ml/min/1.73m2 GFR est non-AA 52 (L) >60 ml/min/1.73m2 Calcium 8.1 (L) 8.5 - 10.1 MG/DL Bilirubin, total 0.4 0.2 - 1.0 MG/DL  
 ALT (SGPT) 10 (L) 12 - 78 U/L  
 AST (SGOT) 10 (L) 15 - 37 U/L Alk. phosphatase 135 (H) 45 - 117 U/L Protein, total 6.7 6.4 - 8.2 g/dL Albumin 2.8 (L) 3.5 - 5.0 g/dL Globulin 3.9 2.0 - 4.0 g/dL A-G Ratio 0.7 (L) 1.1 - 2.2 PROTHROMBIN TIME + INR Result Value Ref Range INR 1.2 (H) 0.9 - 1.1 Prothrombin time 12.7 (H) 9.0 - 11.1 sec PTT Result Value Ref Range aPTT 27.9 22.1 - 32.0 sec  
 aPTT, therapeutic range     58.0 - 77.0 SECS  
URINALYSIS W/MICROSCOPIC Result Value Ref Range Color YELLOW/STRAW Appearance CLEAR CLEAR Specific gravity 1.018 1.003 - 1.030    
 pH (UA) 7.0 5.0 - 8.0 Protein 300 (A) NEG mg/dL Glucose Negative NEG mg/dL Ketone Negative NEG mg/dL Bilirubin Negative NEG Blood LARGE (A) NEG Urobilinogen 0.2 0.2 - 1.0 EU/dL Nitrites Negative NEG Leukocyte Esterase Negative NEG    
 WBC 10-20 0 - 4 /hpf  
 RBC >100 (H) 0 - 5 /hpf Epithelial cells FEW FEW /lpf Bacteria Negative NEG /hpf Other: Renal Epithelial cells Present SAMPLES BEING HELD Result Value Ref Range SAMPLES BEING HELD 1RED COMMENT Add-on orders for these samples will be processed based on acceptable specimen integrity and analyte stability, which may vary by analyte. OCCULT BLOOD, STOOL Result Value Ref Range Occult blood, stool Negative NEG METABOLIC PANEL, BASIC Result Value Ref Range Sodium 138 136 - 145 mmol/L Potassium 4.4 3.5 - 5.1 mmol/L Chloride 110 (H) 97 - 108 mmol/L  
 CO2 22 21 - 32 mmol/L Anion gap 6 5 - 15 mmol/L Glucose 95 65 - 100 mg/dL BUN 19 6 - 20 MG/DL Creatinine 1.17 0.70 - 1.30 MG/DL  
 BUN/Creatinine ratio 16 12 - 20 GFR est AA >60 >60 ml/min/1.73m2 GFR est non-AA >60 >60 ml/min/1.73m2 Calcium 7.9 (L) 8.5 - 10.1 MG/DL PROTHROMBIN TIME + INR Result Value Ref Range INR 1.3 (H) 0.9 - 1.1 Prothrombin time 13.2 (H) 9.0 - 11.1 sec HGB & HCT Result Value Ref Range HGB 6.7 (L) 12.1 - 17.0 g/dL HCT 22.2 (L) 36.6 - 50.3 % TYPE & SCREEN Result Value Ref Range Crossmatch Expiration 07/05/2020 ABO/Rh(D) O POSITIVE Antibody screen POS Comment Previously identified nonspecific antibody and nonspecific cold antibody Antibody ID ANTI-c 
ANTI-E 
   
 ANTIGEN TYPING c NEGATIVE, 
E NEGATIVE, 
   
 ROYER Poly POS   
 ROYER IgG POS   
 ROYER C3b/C3d NEG Unit number [id number] Blood component type [facility] Unit division 00 Status of unit ISSUED ANTIGEN/ANTIBODY INFO c NEGATIVE, 
E NEGATIVE, Crossmatch result Compatible Unit number [id number] Blood component type [facility] Unit division 00 Status of unit ALLOCATED ANTIGEN/ANTIBODY INFO c NEGATIVE, 
E NEGATIVE, Crossmatch result Compatible Physical Exam  
Constitutional: He is oriented to person, place, and time. He appears well-developed and well-nourished. No distress. Eyes: Pupils are equal, round, and reactive to light. Neck: Normal range of motion. No JVD present. Cardiovascular: Normal rate, regular rhythm and normal heart sounds. + VAD hum Pulmonary/Chest: Effort normal and breath sounds normal. No respiratory distress. Abdominal: Soft. Bowel sounds are normal. He exhibits no distension. Musculoskeletal: Normal range of motion. General: No edema. Comments: +1BLE- improved Neurological: He is alert and oriented to person, place, and time. Skin: Skin is warm and dry. Drive line site- + erythema, edema, mod purulent drainage Psychiatric: He has a normal mood and affect. Nursing note and vitals reviewed. Results for orders placed or performed during the hospital encounter of 07/02/20 URINE CULTURE HOLD SAMPLE  
 Result Value Ref Range Urine culture hold Urine on hold in Microbiology dept for 2 days. If unpreserved urine is submitted, it cannot be used for addtional testing after 24 hours, recollection will be required. CBC WITH AUTOMATED DIFF Result Value Ref Range WBC 3.7 (L) 4.1 - 11.1 K/uL  
 RBC 1.96 (L) 4.10 - 5.70 M/uL HGB 6.2 (L) 12.1 - 17.0 g/dL HCT 20.5 (L) 36.6 - 50.3 % .6 (H) 80.0 - 99.0 FL  
 MCH 31.6 26.0 - 34.0 PG  
 MCHC 30.2 30.0 - 36.5 g/dL  
 RDW 17.8 (H) 11.5 - 14.5 % PLATELET 165 469 - 744 K/uL MPV 8.8 (L) 8.9 - 12.9 FL  
 NRBC 0.0 0  WBC ABSOLUTE NRBC 0.00 0.00 - 0.01 K/uL NEUTROPHILS 72 32 - 75 % LYMPHOCYTES 16 12 - 49 % MONOCYTES 8 5 - 13 % EOSINOPHILS 3 0 - 7 % BASOPHILS 1 0 - 1 % IMMATURE GRANULOCYTES 0 0.0 - 0.5 % ABS. NEUTROPHILS 2.7 1.8 - 8.0 K/UL  
 ABS. LYMPHOCYTES 0.6 (L) 0.8 - 3.5 K/UL  
 ABS. MONOCYTES 0.3 0.0 - 1.0 K/UL  
 ABS. EOSINOPHILS 0.1 0.0 - 0.4 K/UL  
 ABS. BASOPHILS 0.0 0.0 - 0.1 K/UL  
 ABS. IMM. GRANS. 0.0 0.00 - 0.04 K/UL  
 DF SMEAR SCANNED    
 RBC COMMENTS ANISOCYTOSIS 1+ 
    
 RBC COMMENTS MACROCYTOSIS 
1+ METABOLIC PANEL, COMPREHENSIVE Result Value Ref Range Sodium 136 136 - 145 mmol/L Potassium 4.1 3.5 - 5.1 mmol/L Chloride 107 97 - 108 mmol/L  
 CO2 23 21 - 32 mmol/L Anion gap 6 5 - 15 mmol/L Glucose 107 (H) 65 - 100 mg/dL BUN 19 6 - 20 MG/DL Creatinine 1.36 (H) 0.70 - 1.30 MG/DL  
 BUN/Creatinine ratio 14 12 - 20 GFR est AA >60 >60 ml/min/1.73m2 GFR est non-AA 52 (L) >60 ml/min/1.73m2 Calcium 8.1 (L) 8.5 - 10.1 MG/DL Bilirubin, total 0.4 0.2 - 1.0 MG/DL  
 ALT (SGPT) 10 (L) 12 - 78 U/L  
 AST (SGOT) 10 (L) 15 - 37 U/L Alk. phosphatase 135 (H) 45 - 117 U/L Protein, total 6.7 6.4 - 8.2 g/dL Albumin 2.8 (L) 3.5 - 5.0 g/dL Globulin 3.9 2.0 - 4.0 g/dL A-G Ratio 0.7 (L) 1.1 - 2.2 PROTHROMBIN TIME + INR Result Value Ref Range INR 1.2 (H) 0.9 - 1.1 Prothrombin time 12.7 (H) 9.0 - 11.1 sec PTT Result Value Ref Range aPTT 27.9 22.1 - 32.0 sec  
 aPTT, therapeutic range     58.0 - 77.0 SECS  
URINALYSIS W/MICROSCOPIC Result Value Ref Range Color YELLOW/STRAW Appearance CLEAR CLEAR Specific gravity 1.018 1.003 - 1.030    
 pH (UA) 7.0 5.0 - 8.0 Protein 300 (A) NEG mg/dL Glucose Negative NEG mg/dL Ketone Negative NEG mg/dL Bilirubin Negative NEG Blood LARGE (A) NEG Urobilinogen 0.2 0.2 - 1.0 EU/dL Nitrites Negative NEG Leukocyte Esterase Negative NEG    
 WBC 10-20 0 - 4 /hpf  
 RBC >100 (H) 0 - 5 /hpf Epithelial cells FEW FEW /lpf Bacteria Negative NEG /hpf Other: Renal Epithelial cells Present SAMPLES BEING HELD Result Value Ref Range SAMPLES BEING HELD 1RED COMMENT Add-on orders for these samples will be processed based on acceptable specimen integrity and analyte stability, which may vary by analyte. OCCULT BLOOD, STOOL Result Value Ref Range Occult blood, stool Negative NEG METABOLIC PANEL, BASIC Result Value Ref Range Sodium 138 136 - 145 mmol/L Potassium 4.4 3.5 - 5.1 mmol/L Chloride 110 (H) 97 - 108 mmol/L  
 CO2 22 21 - 32 mmol/L Anion gap 6 5 - 15 mmol/L Glucose 95 65 - 100 mg/dL BUN 19 6 - 20 MG/DL Creatinine 1.17 0.70 - 1.30 MG/DL  
 BUN/Creatinine ratio 16 12 - 20 GFR est AA >60 >60 ml/min/1.73m2 GFR est non-AA >60 >60 ml/min/1.73m2 Calcium 7.9 (L) 8.5 - 10.1 MG/DL PROTHROMBIN TIME + INR Result Value Ref Range INR 1.3 (H) 0.9 - 1.1 Prothrombin time 13.2 (H) 9.0 - 11.1 sec HGB & HCT Result Value Ref Range HGB 6.7 (L) 12.1 - 17.0 g/dL HCT 22.2 (L) 36.6 - 50.3 % TYPE & SCREEN Result Value Ref Range Crossmatch Expiration 07/05/2020 ABO/Rh(D) O POSITIVE Antibody screen POS Comment Previously identified nonspecific antibody and nonspecific cold antibody Antibody ID ANTI-c 
ANTI-E 
   
 ANTIGEN TYPING c NEGATIVE, 
E NEGATIVE, 
   
 ROYER Poly POS   
 ROYER IgG POS   
 ROYER C3b/C3d NEG Unit number [id number] Blood component type  [facility] Unit division 00 Status of unit ISSUED ANTIGEN/ANTIBODY INFO c NEGATIVE, 
E NEGATIVE, Crossmatch result Compatible Unit number [id number] Blood component type [facility] Unit division 00 Status of unit ALLOCATED ANTIGEN/ANTIBODY INFO c NEGATIVE, 
E NEGATIVE, Crossmatch result Compatible PAST MEDICAL HISTORY: 
Past Medical History:  
Diagnosis Date  Degenerative disc disease, lumbar  Heart failure ([facility] [address]  High cholesterol  Hypertension  Paroxysmal atrial fibrillation ([facility] [address] 4/2/2019  Spinal stenosis PAST SURGICAL HISTORY: 
Past Surgical History:  
Procedure Laterality Date  COLONOSCOPY Left 11/11/2019 COLONOSCOPY at bedside performed by Aki Kay MD at [address]  HX CORONARY ARTERY BYPASS GRAFT    
 triple  HX HEART ASSIST DEVICE Left 10/29/2019 Impella 5.0  
 HX HEART ASSIST DEVICE Left 11/18/2019 HeartMate 3  
 HX HERNIA REPAIR    
 HX IMPLANTABLE CARDIOVERTER DEFIBRILLATOR  HX THROMBECTOMY Left 11/25/2019 Left brachial thrombectomy  IA CARDIOVERSION ELECTIVE ARRHYTHMIA EXTERNAL N/A 6/10/2019 EP CARDIOVERSION performed by Marylou Shaffer MD at [place] [address], [facility] CATH LAB  IA CARDIOVERSION ELECTIVE ARRHYTHMIA EXTERNAL N/A 6/18/2019 EP CARDIOVERSION performed by Jenifer Gonsalez MD at [place] [address], [facility] [address] CATH LAB  IA INSJ/RPLCMT PERM DFB W/TRNSVNS LDS 1/DUAL CHMBR N/A 6/21/2019 INSERT ICD BIV MULTI performed by Nell Sosa MD at [place] [address], [facility] CATH LAB  IA TCAT IMPL WRLS P-ART PRS SNR L-T HEMODYN MNTR N/A 9/18/2019 IMPLANT HEART FAILURE MONITORING DEVICE performed by Link Millard MD at [place] [address], [address] CATH LAB FAMILY HISTORY: 
Family History Problem Relation Age of Onset  Lung Disease Mother  Hypertension Mother [address] [facility] Arthritis-osteo Mother  Heart Disease Mother  Heart Disease Father  Diabetes Father SOCIAL HISTORY: 
Social History Socioeconomic History  Marital status:  Spouse name: Not on file  Number of children: Not on file  Years of education: Not on file  Highest education level: Not on file Tobacco Use  Smoking status: Former Smoker Last attempt to quit: 2010 Years since quittin.5  Smokeless tobacco: Never Used Substance and Sexual Activity  Alcohol use: Not Currently Comment: rarely  Drug use: Never Other Topics Concern ALLERGY: 
Allergies Allergen Reactions  Cefepime Other (comments)  
  myoclonus CURRENT MEDICATIONS: 
 
Current Facility-Administered Medications:  
  finasteride (PROSCAR) tablet 5 mg, 5 mg, Oral, ONCE, Fatimah Raines MD 
  ondansetron [facility]) injection 4 mg, 4 mg, IntraVENous, Q4H PRN, Levi, Shana B, NP 
  albuterol (PROVENTIL VENTOLIN) nebulizer solution 2.5 mg, 2.5 mg, Nebulization, Q4H PRN, Levi, Shana B, NP 
  docusate sodium (COLACE) capsule 100 mg, 100 mg, Oral, BID, Levi, Shana B, NP 
  hydrALAZINE (APRESOLINE) 20 mg/mL injection 10 mg, 10 mg, IntraVENous, Q4H PRN, Levi, Shana B, NP 
  hydrALAZINE (APRESOLINE) 20 mg/mL injection 20 mg, 20 mg, IntraVENous, Q4H PRN, Levi, Shana B, NP 
  0.9% sodium chloride infusion 250 mL, 250 mL, IntraVENous, PRN, Paras Cuadra PA-C 
  sodium chloride (NS) flush 5-40 mL, 5-40 mL, IntraVENous, Q8H, Radha Bolanos MD, 10 mL at 20 1035   sodium chloride (NS) flush 5-40 mL, 5-40 mL, IntraVENous, PRN, Radha Bolanos MD, 10 mL at 20 1035   tamsulosin (FLOMAX) capsule 0.8 mg, 0.8 mg, Oral, QHS, Radha Bolanos MD, 0.8 mg at 20 0237 
  venlafaxine-SR (EFFEXOR-XR) capsule 150 mg, 150 mg, Oral, DAILY WITH Brianna Chappell MD, 150 mg at 07/03/20 5494   levETIRAcetam (KEPPRA) tablet 250 mg, 250 mg, Oral, BID, Janiya Alvarado MD, 250 mg at 07/03/20 1454 
  finasteride (PROSCAR) tablet 5 mg, 5 mg, Oral, DAILY, Janiya Alvarado MD 
  [START ON 7/4/2020] digoxin (LANOXIN) tablet 0.125 mg, 0.125 mg, Oral, EVERY OTHER DAY, Janiya Alvarado MD 
  ciprofloxacin HCl (CIPRO) tablet 750 mg, 750 mg, Oral, Q12H, Janiya Alvarado MD, 750 mg at 07/03/20 4814   budesonide (PULMICORT) 500 mcg/2 ml nebulizer suspension, 500 mcg, Nebulization, QPM, Janiya Alvarado MD 
 
Current Outpatient Medications:  
  clonazePAM (KlonoPIN) 0.5 mg tablet, Take 0.5 Tabs by mouth nightly. Max Daily Amount: 0.25 mg., Disp: 30 Tab, Rfl: 2 
  digoxin (LANOXIN) 0.125 mg tablet, Take 1 Tab by mouth every other day., Disp: 90 Tab, Rfl: 2 
  magnesium oxide (MAG-OX) 400 mg tablet, Take 2 Tabs by mouth two (2) times a day., Disp: 360 Tab, Rfl: 3   ciprofloxacin HCl (CIPRO) 750 mg tablet, Take 1 Tab by mouth two (2) times a day., Disp: 60 Tab, Rfl: 3 
  amoxicillin-clavulanate (AUGMENTIN) 875-125 mg per tablet, Take 1 Tab by mouth two (2) times a day., Disp: 60 Tab, Rfl: 3 
  warfarin (COUMADIN) 1 mg tablet, Take 3 Tabs by mouth daily. May take additional tab as directed by provider., Disp: 200 Tab, Rfl: 3 
  budesonide (PULMICORT) 0.5 mg/2 mL nbsp, 500 mcg by Nebulization route every evening., Disp: , Rfl:  
  albuterol-ipratropium (DUO-NEB) 2.5 mg-0.5 mg/3 ml nebu, 3 mL by Nebulization route nightly., Disp: , Rfl:  
  tamsulosin (Flomax) 0.4 mg capsule, Take 0.8 mg by mouth nightly., Disp: , Rfl:  
  gentamicin (GARAMYCIN) 0.1 % topical ointment, Apply to exit site daily. , Disp: 30 g, Rfl: 0 
  aspirin delayed-release 81 mg tablet, Take 1 Tab by mouth daily. , Disp: 90 Tab, Rfl: 3 
  venlafaxine-SR (EFFEXOR-XR) 150 mg capsule, Take 1 Cap by mouth daily (with breakfast). , Disp: 90 Cap, Rfl: 3   levETIRAcetam (KEPPRA) 250 mg tablet, Take 1 Tab by mouth two (2) times a day., Disp: 180 Tab, Rfl: 3 
  L. acidoph & paracasei- S therm- Bifido (TIAN-Q/RISAQUAD) 8 billion cell cap cap, Take 1 Cap by mouth daily. , Disp: 90 Cap, Rfl: 3 
  finasteride (PROSCAR) 5 mg tablet, Take 1 Tab by mouth daily. , Disp: 90 Tab, Rfl: 3   cholecalciferol (VITAMIN D3) (1000 Units /25 mcg) tablet, Take 2 Tabs by mouth daily. , Disp: 180 Tab, Rfl: 3 
  warfarin (COUMADIN) 3 mg tablet, Take 1 Tab by mouth daily. May take additional tab as directed by provider., Disp: 120 Tab, Rfl: 3 
  albuterol (PROVENTIL HFA, VENTOLIN HFA, PROAIR HFA) 90 mcg/actuation inhaler, Take 2 Puffs by inhalation every six (6) hours as needed for Wheezing., Disp: 1 Inhaler, Rfl: 3 Thank you for letting us see him with you, TIERRA Steele [address], [address] [place] [address] Office [phone] Fax [phone] no

## 2020-07-03 NOTE — PROGRESS NOTES
Admission Medication Reconciliation: 
 
Information obtained from:  Patient, patient's wife via telephone, Alvaro Lara Comments/Recommendations: Reviewed PTA medications and patient's allergies. Patient's wife had a medication list from Heart Failure team's OV. Patient was able to answer most questions about medications but referred to wife for completion. Both were extremely pleasant and helpful. Medication changes (since last review): Added 
- tadalafil Adjusted - warfarin 3mg to 5mg Removed 
- vitamin D, clonazepam 
 
Note, patient on Cipro indefinitely for Pseudomonas and Augmentin indefinitely for MSSA drive-line infection ¹[employer] pharmacy benefit data reflects medications filled and processed through the patient's insurance, however  
this data does NOT capture whether the medication was picked up or is currently being taken by the patient. Allergies:  Cefepime Significant PMH/Disease States:  
Past Medical History:  
Diagnosis Date Degenerative disc disease, lumbar Heart failure ([facility] [address] High cholesterol Hypertension Paroxysmal atrial fibrillation ([facility] [address] 4/2/2019 Spinal stenosis Chief Complaint for this Admission: Chief Complaint Patient presents with Blood in Urine Prior to Admission Medications:  
Prior to Admission Medications Prescriptions Last Dose Informant Patient Reported? Taking? L. acidoph & paracasei- S therm- Bifido (TIAN-Q/RISAQUAD) 8 billion cell cap cap 7/2/2020  No Yes Sig: Take 1 Cap by mouth daily. albuterol (PROVENTIL HFA, VENTOLIN HFA, PROAIR HFA) 90 mcg/actuation inhaler 7/2/2020  No Yes Sig: Take 2 Puffs by inhalation every six (6) hours as needed for Wheezing. amoxicillin-clavulanate (AUGMENTIN) 875-125 mg per tablet 7/2/2020  No Yes Sig: Take 1 Tab by mouth two (2) times a day. aspirin delayed-release 81 mg tablet 7/2/2020  No Yes Sig: Take 1 Tab by mouth daily. budesonide (PULMICORT) 0.5 mg/2 mL nbsp 2020  Yes Yes Si mcg by Nebulization route nightly. ciprofloxacin HCl (CIPRO) 750 mg tablet 2020  No Yes Sig: Take 1 Tab by mouth two (2) times a day. digoxin (LANOXIN) 0.125 mg tablet 2020  No Yes Sig: Take 1 Tab by mouth every other day. finasteride (PROSCAR) 5 mg tablet 2020  No Yes Sig: Take 1 Tab by mouth daily. gentamicin (GARAMYCIN) 0.1 % topical ointment 2020  No Yes Sig: Apply to exit site daily. levETIRAcetam (KEPPRA) 250 mg tablet 2020  No Yes Sig: Take 1 Tab by mouth two (2) times a day. magnesium oxide (MAG-OX) 400 mg tablet 2020  No Yes Sig: Take 2 Tabs by mouth two (2) times a day. tadalafiL (CIALIS) 2.5 mg tablet 2020  Yes Yes Sig: Take 2.5 mg by mouth nightly. tamsulosin (Flomax) 0.4 mg capsule 2020  Yes Yes Sig: Take 0.8 mg by mouth nightly. venlafaxine-SR (EFFEXOR-XR) 150 mg capsule 2020  No Yes Sig: Take 1 Cap by mouth daily (with breakfast). warfarin (COUMADIN) 5 mg tablet 2020 at Unknown time  Yes Yes Sig: Take 5 mg by mouth daily. Facility-Administered Medications: None

## 2020-07-03 NOTE — PROGRESS NOTES
[facility] VAD/Heart Failure Progress Note Admit Date: 2020 POD:  * No surgery found * Procedure:      
 
 Subjective:  
Hematuria; mild dyspnea, fatigue, and weakness Objective:  
Vitals: 
Pulse 76, temperature 98.7 °F (37.1 °C), resp. rate 16, weight 185 lb 10 oz (84.2 kg), SpO2 99 %. Temp (24hrs), Av.3 °F (36.8 °C), Min:97.9 °F (36.6 °C), Max:98.7 °F (37.1 °C) Hemodynamics: 
 CO:   
 CI:   
 CVP:   
 SVR:   
 PAP Systolic:   
 PAP Diastolic:   
 PVR:   
 QD92:   
 SCV02:   
 
VAD Interrogation: LVAD (Heartmate) Pump Speed (RPM): 5800 Pump Flow (LPM): 4.8 Chatter in Lines: No 
PI (Pulsitility Index): 3.8 Power: 4.4 MAP: 100  Test: No 
Back Up  at Bedside & Labeled: Yes Power Module Test: No 
Driveline Site Care: No 
Driveline Dressing: Clean, Dry, and Intact EKG/Rhythm:   
 
Extubation Date / Time:  
 
CT Output:  
 
Ventilator: 
  
 
Oxygen Therapy: 
Oxygen Therapy O2 Sat (%): 99 % (20) O2 Device: Nasal cannula (20 153) O2 Flow Rate (L/min): 2 l/min (20 153) CXR: 
 
Admission Weight: Last Weight Weight: 185 lb 10 oz (84.2 kg) Weight: 185 lb 10 oz (84.2 kg) Intake / Output / Drain: 
Current Shift:  07 -  1900 In: 6532.5 [P.O.:200] Out: 4150 [OUHFK:4938] Last 24 hrs.:  
 
Intake/Output Summary (Last 24 hours) at 7/3/2020 1839 Last data filed at 7/3/2020 1827 Gross per 24 hour Intake 6532.5 ml Output 4150 ml Net 2382.5 ml No results for input(s): CPK, CKMB, TROIQ in the last 72 hours. Recent Labs  
  20 
1508 20 
1038 20 
1806 NA  --  138 136 K  --  4.4 4.1 CO2  --  22 23 BUN  --  19 19 CREA  --  1.17 1.36* GLU  --  95 107* WBC  --   --  3.7* HGB 6.7* 6.7* 6.2* HCT 22.3* 22.2* 20.5* PLT  --   --  176 Recent Labs  
  20 
1038 20 
1806 INR 1.3* 1.2* PTP 13.2* 12.7* APTT  --  27.9 No lab exists for component: PBNP Current Facility-Administered Medications:  
  ondansetron (ZOFRAN) injection 4 mg, 4 mg, IntraVENous, Q4H PRN, Levi, Shana B, NP 
  albuterol (PROVENTIL VENTOLIN) nebulizer solution 2.5 mg, 2.5 mg, Nebulization, Q4H PRN, Levi, Shana B, NP 
  docusate sodium (COLACE) capsule 100 mg, 100 mg, Oral, BID, Levi, Shana B, NP 
  hydrALAZINE (APRESOLINE) 20 mg/mL injection 10 mg, 10 mg, IntraVENous, Q4H PRN, Levi, Shana B, NP 
  hydrALAZINE (APRESOLINE) 20 mg/mL injection 20 mg, 20 mg, IntraVENous, Q4H PRN, Levi, Shana B, NP 
  0.9% sodium chloride infusion 250 mL, 250 mL, IntraVENous, PRN, Levi, Shana B, NP 
  0.9% sodium chloride infusion 250 mL, 250 mL, IntraVENous, PRN, Merlyn Ramos PA-C 
  sodium chloride (NS) flush 5-40 mL, 5-40 mL, IntraVENous, Q8H, Joseph Crum MD, 20 mL at 07/03/20 1405   sodium chloride (NS) flush 5-40 mL, 5-40 mL, IntraVENous, PRN, Joseph Crum MD, 10 mL at 07/03/20 1035   tamsulosin (FLOMAX) capsule 0.8 mg, 0.8 mg, Oral, QHS, Joseph Crum MD, 0.8 mg at 07/03/20 0237 
  venlafaxine-SR (EFFEXOR-XR) capsule 150 mg, 150 mg, Oral, DAILY WITH BREAKFAST, Joseph Crum MD, 150 mg at 07/03/20 8227   levETIRAcetam (KEPPRA) tablet 250 mg, 250 mg, Oral, BID, Joseph Crum MD, 250 mg at 07/03/20 1736 
  finasteride (PROSCAR) tablet 5 mg, 5 mg, Oral, DAILY, Joseph Crum MD 
  [START ON 7/4/2020] digoxin (LANOXIN) tablet 0.125 mg, 0.125 mg, Oral, EVERY OTHER DAY, Joseph Crum MD 
  ciprofloxacin HCl (CIPRO) tablet 750 mg, 750 mg, Oral, Q12H, Joseph Crum MD, 750 mg at 07/03/20 1255   budesonide (PULMICORT) 500 mcg/2 ml nebulizer suspension, 500 mcg, Nebulization, QPM, Joseph Crum MD 
 
A/P Hematuria - urology consult S/P LVAD - good flows Need for anticoagulation - coumadin HTN - home meds Risk of morbidity and mortality - high Medical decision making - high complexity Signed By: Kat Castellanos MD

## 2020-07-03 NOTE — ED NOTES
Assumed care of pt. Pt resting in bed. No complaints at this time. [facility] contacted for LVAD tower. Advsd nurse will be down shortly.

## 2020-07-03 NOTE — PROGRESS NOTES
12:50 TRANSFER - IN REPORT: 
 
Verbal report received from ED(name) on Sona Kiser  being received from Natalee Gilbert RN(unit) for routine progression of care Report consisted of patients Situation, Background, Assessment and  
Recommendations(SBAR). Information from the following report(s) SBAR was reviewed with the receiving nurse. Opportunity for questions and clarification was provided. Assessment to be completed upon patients arrival to unit and care assumed. Awaiting pt's arrival. ETA 20 minutes per ED. 15:25 Scheduled H&H for 15:30 drawn and sent to lab. 
 
16:00 Bedside shift change report given to [address] (oncoming nurse) by Indy Yu (offgoing nurse). Report included the following information SBAR. H&H sent to lab; check result and notify Gala NP for possible need for additional PRBC transfusion. Pt received one unit in ED per report. LVAD drive line dressing to be changed this evening. 16:15 Notified Carl Briggs NP of Hgb result of 6.7. Ordered one unit of PRBC to be transfused, per Gala NP. Notified [address] [place] [address] that blood bank called to say unit is ready for pickup.

## 2020-07-03 NOTE — PROGRESS NOTES
1530 Bedside shift change report given to [address] [place] [address] and Yenni Marina RN Extern (oncoming nurse) by Rey Germain (offgoing nurse). Report included the following information SBAR, Kardex, Intake/Output, MAR and Recent Results. 1820 RBC 1 unit started. Patient's vitals stable. 1930 Bedside shift change report given to  Lynda Kessler, RN  by Anthony Sunshine RN and Yenni Marina RN Externship (offgoing nurse). Report included the following information SBAR, Kardex, Intake/Output, MAR, Recent Results and Cardiac Rhythm Paced.

## 2020-07-03 NOTE — PROGRESS NOTES
Transitions of Care Plan: 
 RUR: 23% Anemia; LVAD; open with [facility] Baseline: Resides at home with his wife; home health; LVAD Disposition: anticipate home with [facility] health services Reason for Admission:   Anemia RUR Score:     23% PCP: First and Last name:  
 Name of Practice: 
 Are you a current patient: Yes/No: 
 Approximate date of last visit:  
 Can you do a virtual visit with your PCP:  
          
Resources/supports as identified by patient/family:   Family. [facility]. Top Challenges facing patient (as identified by patient/family and CM): No concerns. Finances/Medication cost?      Medicare and  Transportation? Family Support system or lack thereof? Family and community support Living arrangements? Address on file with his spouse. Family resides nearby. Self-care/ADLs/Cognition? Independent with self-care. Rollator and wheelchair for ambulation and mobility. Alert and oriented. Current Advanced Directive/Advance Care Plan:  Full Code. Patient has AMD. 
                       
Plan for utilizing home health: [facility]. Transition of Care Plan:               
 
Patient well known to CM. Presented to [facility] ED after hgb dropped. LVAD implanted in 2019. Plan is for patient to return home with his wife and resumption of home health care services. [facility] for [facility] agency. CM will continue to follow. Conrado Cee, MPH Care Management Interventions PCP Verified by CM: Yes 
Palliative Care Criteria Met (RRAT>21 & CHF Dx)?: Yes 
Palliative Consult Recommended?: No 
Reason Palliative Care Not Recommended?: Patient already received consult Mode of Transport at Discharge: Other (see comment)(Family, private car) Transition of Care Consult (CM Consult): Discharge Planning [place] Signup: Yes 
 Discharge Durable Medical Equipment: No 
Health Maintenance Reviewed: Yes Physical Therapy Consult: No 
Occupational Therapy Consult: No 
Speech Therapy Consult: No 
Current Support Network: Lives with Spouse, Own Home Confirm Follow Up Transport: Family The Plan for Transition of Care is Related to the Following Treatment Goals : Home with resumption of Home Health Discharge Location Discharge Placement: Home with home health

## 2020-07-03 NOTE — H&P
[facility] Adult  Hospitalist Group History and Physical 
 
Primary Care Provider: Marquita Liu MD 
Date of Service:  7/2/2020 Subjective:  
 
Lillian Phelps is a 71 y.o. male past medical history significant for nonischemic cardiomyopathy, congestive heart failure with LVAD presented emergency room complaining of blood in the urine. Patient states that for the last 3 weeks ever noticed some blood in his urine but has worsened in the last few days. He is currently on Coumadin and reports compliant with medication. He had blood test ordered by the heart failure clinic and he was found to have a hemoglobin of 6.0 for which he was referred to the emergency room for further evaluation. At the beginning of June 2020 with hemoglobin was around 10. He he has also noticed increased shortness of breath and fatigue for the last 3 weeks. He denies any other symptoms such as chest pain, palpitations, abdominal pain, nausea, vomiting, diarrhea. In the emergency room repeat hemoglobin was 6.4.  1 unit PRBC ordered for the patient. Review of Systems: 
 
Review of Systems Constitutional: Positive for malaise/fatigue. Negative for chills, fever and weight loss. HENT: Negative for congestion, ear discharge and hearing loss. Eyes: Negative for blurred vision and double vision. Respiratory: Positive for shortness of breath. Negative for cough, hemoptysis, sputum production and wheezing. Cardiovascular: Negative for chest pain, palpitations, orthopnea, claudication, leg swelling and PND. Gastrointestinal: Negative for abdominal pain, constipation, diarrhea, melena, nausea and vomiting. Genitourinary: Negative for dysuria, frequency and urgency. Musculoskeletal: Negative for back pain, joint pain and myalgias. Skin: Negative for itching and rash. Neurological: Negative for dizziness, sensory change, speech change, focal weakness, weakness and headaches. Endo/Heme/Allergies: Negative for polydipsia. Does not bruise/bleed easily. Past Medical History:  
Diagnosis Date  Degenerative disc disease, lumbar  Heart failure ([facility] [address]  High cholesterol  Hypertension  Paroxysmal atrial fibrillation ([facility] [address] 4/2/2019  Spinal stenosis Past Surgical History:  
Procedure Laterality Date  COLONOSCOPY Left 11/11/2019 COLONOSCOPY at bedside performed by James Perez MD at [address]  HX CORONARY ARTERY BYPASS GRAFT    
 triple  HX HEART ASSIST DEVICE Left 10/29/2019 Impella 5.0  
 HX HEART ASSIST DEVICE Left 11/18/2019 HeartMate 3  
 HX HERNIA REPAIR    
 HX IMPLANTABLE CARDIOVERTER DEFIBRILLATOR  HX THROMBECTOMY Left 11/25/2019 Left brachial thrombectomy  RI CARDIOVERSION ELECTIVE ARRHYTHMIA EXTERNAL N/A 6/10/2019 EP CARDIOVERSION performed by Julio Kendrick MD at [place] [address] CATH LAB  RI CARDIOVERSION ELECTIVE ARRHYTHMIA EXTERNAL N/A 6/18/2019 EP CARDIOVERSION performed by Bhavik Arvizu MD at [place] [address], [address] CATH LAB  RI INSJ/RPLCMT PERM DFB W/TRNSVNS LDS 1/DUAL CHMBR N/A 6/21/2019 INSERT ICD BIV MULTI performed by Ángel Manuel MD at [place] [address], [address] CATH LAB  RI TCAT IMPL WRLS P-ART PRS SNR L-T HEMODYN MNTR N/A 9/18/2019 IMPLANT HEART FAILURE MONITORING DEVICE performed by Marisela Scanlon MD at [place] [address], [address] CATH LAB Prior to Admission medications Medication Sig Start Date End Date Taking? Authorizing Provider  
clonazePAM (KlonoPIN) 0.5 mg tablet Take 0.5 Tabs by mouth nightly. Max Daily Amount: 0.25 mg. 6/9/20   Ira Herrera NP  
digoxin (LANOXIN) 0.125 mg tablet Take 1 Tab by mouth every other day. 5/26/20   Marian Sims NP  
magnesium oxide (MAG-OX) 400 mg tablet Take 2 Tabs by mouth two (2) times a day.  5/22/20   Marian Sims NP  
ciprofloxacin HCl (CIPRO) 750 mg tablet Take 1 Tab by mouth two (2) times a day. 5/22/20   Saskia BHAKTA, NP  
amoxicillin-clavulanate (AUGMENTIN) 875-125 mg per tablet Take 1 Tab by mouth two (2) times a day. 5/11/20   Shell Herrera NP  
warfarin (COUMADIN) 1 mg tablet Take 3 Tabs by mouth daily. May take additional tab as directed by provider. 4/29/20   Concetta Sims NP  
budesonide (PULMICORT) 0.5 mg/2 mL nbsp 500 mcg by Nebulization route every evening. Provider, Historical  
albuterol-ipratropium (DUO-NEB) 2.5 mg-0.5 mg/3 ml nebu 3 mL by Nebulization route nightly. Provider, Historical  
tamsulosin (Flomax) 0.4 mg capsule Take 0.8 mg by mouth nightly. Provider, Historical  
gentamicin (GARAMYCIN) 0.1 % topical ointment Apply to exit site daily. 3/16/20   Shell Herrera NP  
aspirin delayed-release 81 mg tablet Take 1 Tab by mouth daily. 3/9/20   Shell Herrera NP  
venlafaxine-SR [facility] 150 mg capsule Take 1 Cap by mouth daily (with breakfast). 3/9/20   Shell Herrera NP  
levETIRAcetam (KEPPRA) 250 mg tablet Take 1 Tab by mouth two (2) times a day. 3/9/20   Shell Herrera NP  
LKit acidoph & paracasei- S therm- Bifido (TIAN-Q/RISAQUAD) 8 billion cell cap cap Take 1 Cap by mouth daily. 3/9/20   Shell Herrera NP  
finasteride (PROSCAR) 5 mg tablet Take 1 Tab by mouth daily. 3/9/20   Shell Herrera NP  
cholecalciferol (VITAMIN D3) (1000 Units /25 mcg) tablet Take 2 Tabs by mouth daily. 3/9/20   Shell Herrera NP  
warfarin (COUMADIN) 3 mg tablet Take 1 Tab by mouth daily. May take additional tab as directed by provider. 3/9/20   Shell Herrera NP  
albuterol (PROVENTIL HFA, VENTOLIN HFA, PROAIR HFA) 90 mcg/actuation inhaler Take 2 Puffs by inhalation every six (6) hours as needed for Wheezing. 2/26/20   Karlee Guerra NP Allergies Allergen Reactions  Cefepime Other (comments)  
  myoclonus Family History Problem Relation Age of Onset  Lung Disease Mother  Hypertension Mother Canseco Arthritis-osteo Mother  Heart Disease Mother  Heart Disease Father  Diabetes Father SOCIAL HISTORY: 
Patient resides at home with wife Patient ambulates with assistance Smoking history: Former smoker Alcohol history: None Objective:  
 
 
Physical Exam:  
Patient Vitals for the past 12 hrs: 
 Pulse Resp SpO2  
07/02/20 1945 83 23 99 % 07/02/20 1930 87 21 92 % 07/02/20 1915 78 24 95 % 07/02/20 1757 82  97 % GEN APPEARANCE: Patient resting in bed in NAD HEENT: Conjunctiva Clear CVS: LVAD sound noticed LUNGS: CTAB; No Wheezes; No Rhonchi: No rales ABD: Soft; No TTP; + Normoactive BS 
EXT: no edema Skin exam: Pale. No gross lesions noted on exposed skin surfaces MENTAL STATUS: Answers questions appropriately, responds to commands. Neuro:  No gross motor or sensory deficits Data Review:  
Recent Results (from the past 24 hour(s)) CBC WITH AUTOMATED DIFF Collection Time: 07/02/20  6:06 PM  
Result Value Ref Range WBC 3.7 (L) 4.1 - 11.1 K/uL  
 RBC 1.96 (L) 4.10 - 5.70 M/uL HGB 6.2 (L) 12.1 - 17.0 g/dL HCT 20.5 (L) 36.6 - 50.3 % .6 (H) 80.0 - 99.0 FL  
 MCH 31.6 26.0 - 34.0 PG  
 MCHC 30.2 30.0 - 36.5 g/dL  
 RDW 17.8 (H) 11.5 - 14.5 % PLATELET 864 724 - 702 K/uL MPV 8.8 (L) 8.9 - 12.9 FL  
 NRBC 0.0 0  WBC ABSOLUTE NRBC 0.00 0.00 - 0.01 K/uL NEUTROPHILS 72 32 - 75 % LYMPHOCYTES 16 12 - 49 % MONOCYTES 8 5 - 13 % EOSINOPHILS 3 0 - 7 % BASOPHILS 1 0 - 1 % IMMATURE GRANULOCYTES 0 0.0 - 0.5 % ABS. NEUTROPHILS 2.7 1.8 - 8.0 K/UL  
 ABS. LYMPHOCYTES 0.6 (L) 0.8 - 3.5 K/UL  
 ABS. MONOCYTES 0.3 0.0 - 1.0 K/UL  
 ABS. EOSINOPHILS 0.1 0.0 - 0.4 K/UL  
 ABS. BASOPHILS 0.0 0.0 - 0.1 K/UL  
 ABS. IMM. GRANS. 0.0 0.00 - 0.04 K/UL  
 DF SMEAR SCANNED    
 RBC COMMENTS ANISOCYTOSIS 1+ 
    
 RBC COMMENTS MACROCYTOSIS 
1+ METABOLIC PANEL, COMPREHENSIVE Collection Time: 07/02/20  6:06 PM  
Result Value Ref Range Sodium 136 136 - 145 mmol/L Potassium 4.1 3.5 - 5.1 mmol/L Chloride 107 97 - 108 mmol/L  
 CO2 23 21 - 32 mmol/L Anion gap 6 5 - 15 mmol/L Glucose 107 (H) 65 - 100 mg/dL BUN 19 6 - 20 MG/DL Creatinine 1.36 (H) 0.70 - 1.30 MG/DL  
 BUN/Creatinine ratio 14 12 - 20 GFR est AA >60 >60 ml/min/1.73m2 GFR est non-AA 52 (L) >60 ml/min/1.73m2 Calcium 8.1 (L) 8.5 - 10.1 MG/DL Bilirubin, total 0.4 0.2 - 1.0 MG/DL  
 ALT (SGPT) 10 (L) 12 - 78 U/L  
 AST (SGOT) 10 (L) 15 - 37 U/L Alk. phosphatase 135 (H) 45 - 117 U/L Protein, total 6.7 6.4 - 8.2 g/dL Albumin 2.8 (L) 3.5 - 5.0 g/dL Globulin 3.9 2.0 - 4.0 g/dL A-G Ratio 0.7 (L) 1.1 - 2.2 PROTHROMBIN TIME + INR Collection Time: 07/02/20  6:06 PM  
Result Value Ref Range INR 1.2 (H) 0.9 - 1.1 Prothrombin time 12.7 (H) 9.0 - 11.1 sec PTT Collection Time: 07/02/20  6:06 PM  
Result Value Ref Range aPTT 27.9 22.1 - 32.0 sec  
 aPTT, therapeutic range     58.0 - 77.0 SECS  
SAMPLES BEING HELD Collection Time: 07/02/20  6:06 PM  
Result Value Ref Range SAMPLES BEING HELD 1RED COMMENT Add-on orders for these samples will be processed based on acceptable specimen integrity and analyte stability, which may vary by analyte. TYPE & SCREEN Collection Time: 07/02/20  6:06 PM  
Result Value Ref Range Crossmatch Expiration 07/05/2020 ABO/Rh(D) O POSITIVE Antibody screen PENDING Comment Previously identified nonspecific antibody and nonspecific cold antibody URINALYSIS W/MICROSCOPIC Collection Time: 07/02/20  7:22 PM  
Result Value Ref Range Color YELLOW/STRAW Appearance CLEAR CLEAR Specific gravity 1.018 1.003 - 1.030    
 pH (UA) 7.0 5.0 - 8.0 Protein 300 (A) NEG mg/dL Glucose Negative NEG mg/dL Ketone Negative NEG mg/dL Bilirubin Negative NEG Blood LARGE (A) NEG Urobilinogen 0.2 0.2 - 1.0 EU/dL  Nitrites Negative NEG    
 Leukocyte Esterase Negative NEG    
 WBC 10-20 0 - 4 /hpf  
 RBC >100 (H) 0 - 5 /hpf Epithelial cells FEW FEW /lpf Bacteria Negative NEG /hpf Other: Renal Epithelial cells Present URINE CULTURE HOLD SAMPLE Collection Time: 07/02/20  7:22 PM  
Result Value Ref Range Urine culture hold Urine on hold in Microbiology dept for 2 days. If unpreserved urine is submitted, it cannot be used for addtional testing after 24 hours, recollection will be required. OCCULT BLOOD, STOOL Collection Time: 07/02/20  9:41 PM  
Result Value Ref Range Occult blood, stool Negative NEG Assessment:  
 
Active Problems: 
  Anemia (7/2/2020) Hematuria (7/2/2020) Plan: 1. Acute on chronic anemia: likely secondary to hematuria 
- transfused to keep Hbg > 7 
- Trend H/H every 6 hours 2. Hematuria: in the setting of anticoagulation however INR 1.2. 
- Urology consult. - Consider CBI 3. Chronic UTI: on chronic cipro as per ID recommendations. Now with hematuria and wbc but no other sign of infection 
- will send urine for culture - will continue with chronic therapy of cipro 750mg bid 4. ICM - Stage D,  LVEF 15%, NYHA Class IV improved to NYHA Class III s/p HM3 LVAD  
- Heart failure consult to assist with LVAD 
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 
- Continue Entresto 24/26 mg PO BID 5. PAF 
- Off digoxin d/t tremors and not on bb due to d/t RV failure - On warfarin. Subtherapeutic INR but will hold due to hematuria 6. BPH 
- continue finasteride and tamsulosin 7. JOSE: order cpap DVT prophy: scd Code status FUNCTIONAL STATUS PRIOR TO HOSPITALIZATION Ambulates Independently Signed By: Francis Mtz MD   
 July 2, 2020

## 2020-07-03 NOTE — ROUTINE PROCESS
TRANSFER - OUT REPORT: 
 
Verbal report given to Charlie Serrano RN(name) on Att[place] Prey  being transferred to [facility](unit) for routine progression of care Report consisted of patients Situation, Background, Assessment and  
Recommendations(SBAR). Information from the following report(s) SBAR was reviewed with the receiving nurse. Lines:  
Peripheral IV 07/02/20 Left Antecubital (Active) Site Assessment Clean, dry, & intact 7/2/2020  6:13 PM  
Phlebitis Assessment 0 7/2/2020  6:13 PM  
Infiltration Assessment 0 7/2/2020  6:13 PM  
Dressing Status Clean, dry, & intact 7/2/2020  6:13 PM  
Dressing Type Transparent 7/2/2020  6:13 PM  
Hub Color/Line Status Patent; Flushed;Capped;Pink 7/2/2020  6:13 PM  
Action Taken Blood drawn 7/2/2020  6:13 PM  
   
Peripheral IV 07/03/20 Right;Upper Arm (Active) Site Assessment Clean, dry, & intact 7/3/2020  4:12 AM  
Phlebitis Assessment 0 7/3/2020  4:12 AM  
Infiltration Assessment 0 7/3/2020  4:12 AM  
Dressing Status Clean, dry, & intact 7/3/2020  4:12 AM  
Dressing Type 4 X 4 7/3/2020  4:12 AM  
Hub Color/Line Status Pink 7/3/2020  4:12 AM  
  
 
Opportunity for questions and clarification was provided.    
 
Patient transported with: 
 Monitor and RN

## 2020-07-04 NOTE — PROGRESS NOTES
40[address] [facility] Inpatient Progress Note Patient name: Rosenda Stanley Patient : 1950 Patient MRN: [id number] Date of service: 20 CHIEF COMPLAINT: 
Chief Complaint Patient presents with  Blood in Urine HPI: Cinthia Kiser is a 71y.o. year old white male with a history of HTN, HLD, JOSE, CAD s/p cardiac arrest VFib s/p CABG () c/b sternal would infection and sternectomy, ischemic cardiomyopathy LVEF 15-20%, s/p BiVICD  who underwent LVAD as DT on 2019 after bridging with Impella 5.0.  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 He was transferred to [facility] rehab on  and discharged from rehab on 2020. Fransisco Walters was seen  for an LVAD follow up visit. Due to fever, chills, and hypotension, he was referred to the ED, evaluated and admitted to [facility]. He underwent treatment for recurrent pseudomonas bacteremia due to UTI. His Cipro was resumed and he was discharged home in stable condition. Mr. John Hall presented to ED after routine labs showed a hgb drop from 10 to 6.8, he states he has had hematuria on and off for 2 weeks. He did note that he was more fatigued when working with PT at home. He denies other acute complaints. 24Hr Events Admitted to [facility] Started CBI 
[facility] [place] held 2 unit PRBC Assessment/Plan: NYHA Class II Acute blood loss anemia due to hematuria Hgb improved post 2 unit PRBC transfusion Transfuse to keep hgb> 7 Urology consult Continuous bladder irrigation Hold AC for now ICM - Stage D,  LVEF 15%, NYHA Class IV improved to NYHA Class II s/p HM3 LVAD as DT on 19 with Impella 5.0 as bridge to LVAD Continue LVAD speed at 5800 rpm  
 TTE  shows large LVIDd, 6.84 cm, RVIDd 3.06 cm, TAPSE 1.15 cm, severely elevated CVP Continue digoxin 0.125 mg PO daily to support RV through infection Intolerant to BB d/t RV failure Discontinue Entresto due to persistent orthostasis despite increased fluid intake Intolerant to spironolactone due to IVVD and hyponatremia Monitor CardioMEMs readings HTN, goal MAP 70-90 mmHg Discontinue Entresto due to orthostasis MAPs on LUE only d/t previous right axillary Impella cannulation H/o pseudomonal infection [facility] 4/20 - 2/4 bottles growing pseudomonas UA negative for bacteria Continue cipro indefinitely Chronic anticoagulation, goal INR 1.5-2.0 Hold ASA and coumadin due to hematuria for now Monitor MSSA drive line infection Repeat culture from 5/7/20- MSSA Continue Augmentin indefinitely Gentamicin to exit site with daily dressing changes CT x 3 reviewed by Dr. Andrew Smith - small fluid collection adjacent to previous real drain tract has resolved Continue daily dressing changes Trend CTs Tremors Resolved Continue keppra 250 mg BID 
  weaned off clonazepam  
 
BPH On finasteride and tamsulosin Urology consult pending Recheck PSA in am  
  
Depression On Effexor -  mg daily Persistently elevated CEA Persistently elevated CEA; PET scan shows increased activity RML of lung Oncology consult appreciated - PET not suggestive of malignancy Orthostatic Hypotension Persistent Discontinue Cynthia Encourage use of CAROL hose PAF Digoxin 0.125 mg PO daily Intolerant of BB d/t RV failure On warfarin- on hold due to hematuria Dig level 0.7 Debility Improving PT eval  
 
All other care per primary. CARDIAC IMAGING: 
Chest CTA- no outflow graft occlusion Pet Scan equivocal for amyloid 04/20/20 ECHO ADULT COMPLETE 04/21/2020 4/21/2020 Narrative · Image quality for this study was technically difficult. · Normal wall thickness. Severely dilated left ventricle. Severe systolic function. Estimated left ventricular ejection fraction is <15%. LVAD  
present. 5800 rpm 
· Dilated left atrium. · RV Not well visualized. Mildly reduced systolic function. · Mild aortic valve regurgitation is present. Aortic valve does not open  
in systole. · Mild mitral valve regurgitation is present. · Mild to moderate tricuspid valve regurgitation is present. · Severely elevated central venous pressure (15+ mmHg); IVC diameter is  
larger than 21 mm and collapses less than 50% with respiration. Signed by: Burrell Severs, MD  
 
 
 
10/25/19 ECHO ADULT COMPLETE 01/29/2020 1/29/2020 Narrative · Severely dilated left ventricle. Upper normal wall thickness. Severe  
systolic dysfunction. Estimated left ventricular ejection fraction is  
<15%. Left ventricular diastolic dysfunction. · Mitral valve thickening. Minimal mitral valve prolapse of the anterior  
mitral valve leaflet. Trace mitral valve regurgitation is present. · Mild tricuspid valve regurgitation is present. · Mildly dilated right ventricle. Moderately reduced RV systolic function (TAPSE 1.3 cm, RV S' 6 cm/s). Pacer/ICD present. · Mildly biatrial enlargement · Severely elevated central venous pressure (15+ mmHg); IVC diameter is  
larger than 21 mm and collapses less than 50% with respiration. · LVAD inflow cannula spectral Doppler tracings not obtained. Signed by: Beth Mcdaniels MD  
 
  
  
OTHER IMAGING: 
Head CT negative for acute process EEG suggestive of mild generalized encephalopathic process, possibly related to underlying structural brain injury vs metabolic abnormality Review of Systems Constitutional: Negative for chills, fever and malaise/fatigue. Respiratory: Negative for cough, sputum production and shortness of breath. Cardiovascular: Negative for chest pain, palpitations, leg swelling and PND. + AICD shock Gastrointestinal: Negative for blood in stool, heartburn, nausea and vomiting. Genitourinary: Positive for hematuria. Musculoskeletal: Negative. Neurological: Negative for dizziness, weakness and headaches. Endo/Heme/Allergies: Bruises/bleeds easily. Psychiatric/Behavioral: Negative. Visit Vitals Pulse 78 Temp 98.5 °F (36.9 °C) Resp 18 Wt 183 lb 13.8 oz (83.4 kg) SpO2 100% BMI 23.61 kg/m² LVAD Pump Speed (RPM): 5800 Pump Flow (LPM): 4.7 MAP: 95 
PI (Pulsitility Index): 3.5 Power: 4.4  Test: No 
Back Up  at Bedside & Labeled: Yes Power Module Test: No 
Driveline Site Care: No 
Driveline Dressing: Clean, Dry, and Intact Outpatient: No 
MAP in Therapeutic Range (Outpatient): No 
Testing Alarms Reviewed: Yes 
Back up SC speed: 5800 Back up Low Speed Limit: 5400 Drive line site inspected?: (covered by dressing) Drive line intergrity inspected?: Yes Drive line dressing changed?: No 
  
 
Physical Exam  
Constitutional: He is oriented to person, place, and time. He appears well-developed and well-nourished. No distress. Eyes: Pupils are equal, round, and reactive to light. Neck: Normal range of motion. No JVD present. Cardiovascular: Normal rate, regular rhythm and normal heart sounds. + VAD hum Pulmonary/Chest: Effort normal and breath sounds normal. No respiratory distress. Abdominal: Soft. Bowel sounds are normal. He exhibits no distension. Musculoskeletal: Normal range of motion. General: No edema. Comments: +1BLE- improved Neurological: He is alert and oriented to person, place, and time. Skin: Skin is warm and dry. Drive line site- + erythema, edema, mod purulent drainage Psychiatric: He has a normal mood and affect. Nursing note and vitals reviewed. Recent Results (from the past 12 hour(s)) HGB & HCT Collection Time: 07/03/20 10:56 PM  
Result Value Ref Range HGB 7.9 (L) 12.1 - 17.0 g/dL HCT 25.5 (L) 36.6 - 50.3 % DIGOXIN  
 Collection Time: 07/04/20  4:52 AM  
Result Value Ref Range Digoxin level 0.7 (L) 0.90 - 8.13 NG/ML  
METABOLIC PANEL, COMPREHENSIVE Collection Time: 07/04/20  4:54 AM  
Result Value Ref Range Sodium 136 136 - 145 mmol/L Potassium 4.2 3.5 - 5.1 mmol/L Chloride 109 (H) 97 - 108 mmol/L  
 CO2 21 21 - 32 mmol/L Anion gap 6 5 - 15 mmol/L Glucose 113 (H) 65 - 100 mg/dL BUN 17 6 - 20 MG/DL Creatinine 1.26 0.70 - 1.30 MG/DL  
 BUN/Creatinine ratio 13 12 - 20 GFR est AA >60 >60 ml/min/1.73m2 GFR est non-AA 57 (L) >60 ml/min/1.73m2 Calcium 7.9 (L) 8.5 - 10.1 MG/DL Bilirubin, total 0.6 0.2 - 1.0 MG/DL  
 ALT (SGPT) 10 (L) 12 - 78 U/L  
 AST (SGOT) 8 (L) 15 - 37 U/L Alk. phosphatase 113 45 - 117 U/L Protein, total 6.3 (L) 6.4 - 8.2 g/dL Albumin 2.5 (L) 3.5 - 5.0 g/dL Globulin 3.8 2.0 - 4.0 g/dL A-G Ratio 0.7 (L) 1.1 - 2.2 MAGNESIUM Collection Time: 07/04/20  4:54 AM  
Result Value Ref Range Magnesium 1.9 1.6 - 2.4 mg/dL LD Collection Time: 07/04/20  4:54 AM  
Result Value Ref Range  85 - 241 U/L  
PROTHROMBIN TIME + INR Collection Time: 07/04/20  4:54 AM  
Result Value Ref Range INR 1.3 (H) 0.9 - 1.1 Prothrombin time 13.2 (H) 9.0 - 11.1 sec CBC W/O DIFF Collection Time: 07/04/20  4:54 AM  
Result Value Ref Range WBC 4.0 (L) 4.1 - 11.1 K/uL  
 RBC 2.56 (L) 4.10 - 5.70 M/uL HGB 7.9 (L) 12.1 - 17.0 g/dL HCT 25.4 (L) 36.6 - 50.3 % MCV 99.2 (H) 80.0 - 99.0 FL  
 MCH 30.9 26.0 - 34.0 PG  
 MCHC 31.1 30.0 - 36.5 g/dL  
 RDW 17.8 (H) 11.5 - 14.5 % PLATELET 945 973 - 045 K/uL MPV 8.6 (L) 8.9 - 12.9 FL  
 NRBC 0.0 0  WBC ABSOLUTE NRBC 0.00 0.00 - 0.01 K/uL NT-PRO BNP Collection Time: 07/04/20  4:54 AM  
Result Value Ref Range NT pro-BNP 7,672 (H) <125 PG/ML  
 
 
 
 
PAST MEDICAL HISTORY: 
Past Medical History:  
Diagnosis Date  Degenerative disc disease, lumbar  Heart failure ([facility] [address]  High cholesterol  Hypertension  Paroxysmal atrial fibrillation ([facility] [address] 2019  Spinal stenosis PAST SURGICAL HISTORY: 
Past Surgical History:  
Procedure Laterality Date  COLONOSCOPY Left 2019 COLONOSCOPY at bedside performed by Jimena Daugherty MD at [address]  HX CORONARY ARTERY BYPASS GRAFT    
 triple  HX HEART ASSIST DEVICE Left 10/29/2019 Impella 5.0  
 HX HEART ASSIST DEVICE Left 2019 HeartMate 3  
 HX HERNIA REPAIR    
 HX IMPLANTABLE CARDIOVERTER DEFIBRILLATOR  HX THROMBECTOMY Left 2019 Left brachial thrombectomy  HI CARDIOVERSION ELECTIVE ARRHYTHMIA EXTERNAL N/A 6/10/2019 EP CARDIOVERSION performed by Haydee Neal MD at [address] CATH LAB  HI CARDIOVERSION ELECTIVE ARRHYTHMIA EXTERNAL N/A 2019 EP CARDIOVERSION [facility] [address] [address] [facility]  HI INSJ/RPLCMT PERM DFB W/TRNSVNS LDS 1/DUAL CHMBR N/A 2019 INSERT ICD BIV MULTI performed by Carmelo Fonseca MD at [address] CATH LAB  HI TCAT IMPL WRLS P-ART PRS SNR L-T HEMODYN MNTR N/A 2019 IMPLANT HEART FAILURE MONITORING DEVICE performed by Paris Haley MD at [address] CATH LAB FAMILY HISTORY: 
Family History Problem Relation Age of Onset  Lung Disease Mother  Hypertension Mother [facility] Arthritis-osteo Mother  Heart Disease Mother  Heart Disease Father  Diabetes Father SOCIAL HISTORY: 
Social History Socioeconomic History  Marital status:  Spouse name: Not on file  Number of children: Not on file  Years of education: Not on file  Highest education level: Not on file Tobacco Use  Smoking status: Former Smoker Last attempt to quit: 2010 Years since quittin.5  Smokeless tobacco: Never Used Substance and Sexual Activity  Alcohol use: Not Currently Comment: rarely  Drug use: Never Other Topics Concern ALLERGY: 
Allergies Allergen Reactions  Cefepime Other (comments)  
  myoclonus CURRENT MEDICATIONS: 
 
Current Facility-Administered Medications:  
  ondansetron (ZOFRAN) injection 4 mg, 4 mg, IntraVENous, Q4H PRN, Levi, Shana B, NP 
  albuterol (PROVENTIL VENTOLIN) nebulizer solution 2.5 mg, 2.5 mg, Nebulization, Q4H PRN, Levi, Shana B, NP 
  docusate sodium (COLACE) capsule 100 mg, 100 mg, Oral, BID, Levi, Shana B, NP 
  hydrALAZINE (APRESOLINE) 20 mg/mL injection 10 mg, 10 mg, IntraVENous, Q4H PRN, Levi, Shana B, NP 
  hydrALAZINE (APRESOLINE) 20 mg/mL injection 20 mg, 20 mg, IntraVENous, Q4H PRN, Levi, Shana B, NP 
  0.9% sodium chloride infusion 250 mL, 250 mL, IntraVENous, PRN, Levi, Shana B, NP 
  0.9% sodium chloride infusion 250 mL, 250 mL, IntraVENous, PRN, Cristihan Monroy PA-C 
  sodium chloride (NS) flush 5-40 mL, 5-40 mL, IntraVENous, Q8H, Larisa Sosa MD, 10 mL at 07/04/20 9677   sodium chloride (NS) flush 5-40 mL, 5-40 mL, IntraVENous, PRN, Larisa Sosa MD, 10 mL at 07/03/20 1035   tamsulosin (FLOMAX) capsule 0.8 mg, 0.8 mg, Oral, QHS, Larisa Sosa MD, 0.8 mg at 07/03/20 2201 
  venlafaxine-SR (EFFEXOR-XR) capsule 150 mg, 150 mg, Oral, DAILY WITH BREAKFAST, Larisa Sosa MD, 150 mg at 07/03/20 2737   levETIRAcetam (KEPPRA) tablet 250 mg, 250 mg, Oral, BID, Larisa Sosa MD, 250 mg at 07/03/20 1736 
  finasteride (PROSCAR) tablet 5 mg, 5 mg, Oral, DAILY, Larisa Sosa MD, 5 mg at 07/03/20 2201   digoxin (LANOXIN) tablet 0.125 mg, 0.125 mg, Oral, EVERY OTHER DAY, Larisa Sosa MD 
  ciprofloxacin HCl (CIPRO) tablet 750 mg, 750 mg, Oral, Q12H, Larisa Sosa MD, 750 mg at 07/03/20 2200   budesonide (PULMICORT) 500 mcg/2 ml nebulizer suspension, 500 mcg, Nebulization, QPM, Larisa Sosa MD, 500 mcg at 07/03/20 9301 Thank you for letting us see him with you, Negar Lew, NP 
 [address], [address] Office [phone] Fax [phone]

## 2020-07-04 NOTE — CONSULTS
Urology Consult Patient: Rosalina Muñiz MRN: [id number]  SSN: [id number] YOB: 1950  Age: 71 y.o. Sex: male Date of Consultation:  July 4, 2020 Requesting Physician: Patricia Nguyen MD 
Reason for Consultation: Hematuria Assessment/Plan: Hx gross hematuria 2 wks - intermittent. Had been on coumadin/ASA. Urine slight pink per neal. Prior UTI hx. Urine cx neg this admission. Recommend CT/IVP this admission and will need outpatient cystoscopy. OK to remove neal if urine remains pink to clear. History of Present Illness:  Patient is a 71 y.o. male admitted 7/2/2020 to the hospital for Hematuria [R31.9] Anemia [D64.9]. He is a 71 y. o. male past medical history significant for nonischemic cardiomyopathy, congestive heart failure with LVAD presented emergency room complaining of blood in the urine.  Patient states that for the last 3 weeks ever noticed some blood in his urine but has worsened in the last few days. [facility] is currently on Coumadin and reports compliant with medication. [facility] had blood test ordered by the heart failure clinic and he was found to have a hemoglobin of 6. Past Medical History: Allergies Allergen Reactions  Cefepime Other (comments)  
  myoclonus Prior to Admission medications Medication Sig Start Date End Date Taking? Authorizing Provider  
warfarin (COUMADIN) 5 mg tablet Take 5 mg by mouth daily. Yes Provider, Historical  
budesonide (PULMICORT) 0.5 mg/2 mL nbsp 500 mcg by Nebulization route nightly. Yes Provider, Historical  
tadalafiL (CIALIS) 2.5 mg tablet Take 2.5 mg by mouth nightly. Yes Provider, Historical  
digoxin (LANOXIN) 0.125 mg tablet Take 1 Tab by mouth every other day. 5/26/20  Yes Shana Sims NP  
magnesium oxide (MAG-OX) 400 mg tablet Take 2 Tabs by mouth two (2) times a day.  5/22/20  Yes Carmel Borja NP  
 ciprofloxacin HCl (CIPRO) 750 mg tablet Take 1 Tab by mouth two (2) times a day. 5/22/20  Yes Shana Sims NP  
amoxicillin-clavulanate (AUGMENTIN) 875-125 mg per tablet Take 1 Tab by mouth two (2) times a day. 5/11/20  Yes Lei Lozano NP  
tamsulosin (Flomax) 0.4 mg capsule Take 0.8 mg by mouth nightly. Yes Provider, Historical  
gentamicin (GARAMYCIN) 0.1 % topical ointment Apply to exit site daily. 3/16/20  Yes Lei Lozano NP  
aspirin delayed-release 81 mg tablet Take 1 Tab by mouth daily. 3/9/20  Yes Lei Lozano NP  
venlafaxine-SR [facility] 150 mg capsule Take 1 Cap by mouth daily (with breakfast). 3/9/20  Yes Brielle Herrera NP  
levETIRAcetam (KEPPRA) 250 mg tablet Take 1 Tab by mouth two (2) times a day. 3/9/20  Yes Lei Lozano NP  
L. acidoph & paracasei- S therm- Bifido (TIAN-Q/RISAQUAD) 8 billion cell cap cap Take 1 Cap by mouth daily. 3/9/20  Yes Lei Lozano NP  
finasteride (PROSCAR) 5 mg tablet Take 1 Tab by mouth daily. 3/9/20  Yes Lei Lozano NP  
albuterol (PROVENTIL HFA, VENTOLIN HFA, PROAIR HFA) 90 mcg/actuation inhaler Take 2 Puffs by inhalation every six (6) hours as needed for Wheezing. 2/26/20  Yes Carmel Borja NP  
  
PMHx:  has a past medical history of Degenerative disc disease, lumbar, Heart failure ([facility] [address] High cholesterol, Hypertension, Paroxysmal atrial fibrillation ([facility] [address] (4/2/2019), and Spinal stenosis.   
PSurgHx:  has a past surgical history that includes hx coronary artery bypass graft; hx appendectomy; hx hernia repair; hx implantable cardioverter defibrillator; pr cardioversion elective arrhythmia external (N/A, 6/10/2019); pr cardioversion elective arrhythmia external (N/A, 6/18/2019); pr insj/rplcmt perm dfb w/trnsvns lds 1/dual chmbr (N/A, 6/21/2019); pr tcat impl wrls p-art prs snr l-t hemodyn mntr (N/A, 9/18/2019); colonoscopy (Left, 11/11/2019); hx heart assist device (Left, 10/29/2019); hx heart assist device (Left, 2019); and hx thrombectomy (Left, 2019). PSocHx:  reports that he quit smoking about 9 years ago. He has never used smokeless tobacco. He reports previous alcohol use. He reports that he does not use drugs. ROS:  Admission ROS by Evelin King MD from 2020 were reviewed with the patient and changes (other than per HPI) include: none. Physical Exam: 
 
Vitals:   Temp (24hrs), Av.4 °F (36.9 °C), Min:97.8 °F (36.6 °C), Max:98.7 °F (37.1 °C) Pulse 78, temperature 98.6 °F (37 °C), resp. rate 18, weight 83.4 kg (183 lb 13.8 oz), SpO2 98 %. I&O's:  701 -  1900 In: 3000 Out: 4000 [NOUFJ:9143] GENERAL: WD, male NAD SKIN:  No clubbing, cyanosis, or edema ABDOMEN: Soft, non-tender without masses FLANKS: No CVAT 
BLADDER: Not palpable :  Normal male phallus, scrotum and contents normal 
LYMPH: No cervical,k supraclavicular or axillary LAMIN Lab Results Component Value Date/Time WBC 4.0 (L) 2020 04:54 AM  
 HCT 25.4 (L) 2020 04:54 AM  
 PLATELET 196  04:54 AM  
 Sodium 136 2020 04:54 AM  
 Potassium 4.2 2020 04:54 AM  
 Chloride 109 (H) 2020 04:54 AM  
 CO2 21 2020 04:54 AM  
 BUN 17 2020 04:54 AM  
 Creatinine 1.26 2020 04:54 AM  
 Glucose 113 (H) 2020 04:54 AM  
 Calcium 7.9 (L) 2020 04:54 AM  
 Magnesium 1.9 2020 04:54 AM  
 INR 1.3 (H) 2020 04:54 AM  
 Prostate Specific Ag 0.3 10/25/2019 02:56 PM  
 
 
UA:  
Lab Results Component Value Date/Time  Color YELLOW/STRAW 2020 07:22 PM  
 Appearance CLEAR 2020 07:22 PM  
 Specific gravity 1.018 2020 07:22 PM  
 Specific gravity 1.015 10/31/2019 11:00 AM  
 pH (UA) 7.0 2020 07:22 PM  
 Protein 300 (A) 2020 07:22 PM  
 Glucose Negative 2020 07:22 PM  
 Ketone Negative 2020 07:22 PM  
 Bilirubin Negative 2020 07:22 PM  
 Urobilinogen 0.2 07/02/2020 07:22 PM  
 Nitrites Negative 07/02/2020 07:22 PM  
 Leukocyte Esterase Negative 07/02/2020 07:22 PM  
 Epithelial cells FEW 07/02/2020 07:22 PM  
 Bacteria Negative 07/02/2020 07:22 PM  
 WBC 10-20 07/02/2020 07:22 PM  
 RBC >100 (H) 07/02/2020 07:22 PM  
 
 
Cultures: Component Value Flag Ref Range Units Status Special Requests: NO SPECIAL REQUESTS      Final  
Culture result: No growth (<1,000 CFU/ML) Xrays:  
 
Signed By: Patrick Robbins MD  - July 4, 2020

## 2020-07-04 NOTE — PROGRESS NOTES
[facility] VAD/Heart Failure Progress Note Admit Date: 2020 POD:  * No surgery found * Procedure:      
 
 Subjective:  
Hematuria; mild fatigue and weakness; gave 2 pRBCs; holding AC; bladder irrigation Objective:  
Vitals: 
Pulse 75, temperature 98.2 °F (36.8 °C), resp. rate 18, weight 183 lb 13.8 oz (83.4 kg), SpO2 98 %. Temp (24hrs), Av.4 °F (36.9 °C), Min:97.8 °F (36.6 °C), Max:98.7 °F (37.1 °C) Hemodynamics: 
 CO:   
 CI:   
 CVP:   
 SVR:   
 PAP Systolic:   
 PAP Diastolic:   
 PVR:   
 OS07:   
 SCV02:   
 
VAD Interrogation: LVAD (Heartmate) Pump Speed (RPM): 5800 Pump Flow (LPM): 4.8 Chatter in Lines: No 
PI (Pulsitility Index): 3.7 Power: 4.4 MAP: 98  Test: No 
Back Up  at Bedside & Labeled: Yes Power Module Test: No 
Driveline Site Care: No 
Driveline Dressing: Clean, Dry, and Intact EKG/Rhythm:   
 
Extubation Date / Time:  
 
CT Output:  
 
Ventilator: 
  
 
Oxygen Therapy: 
Oxygen Therapy O2 Sat (%): 98 % (20 1532) Pulse via Oximetry: 87 beats per minute (20 1938) O2 Device: Room air (20 1121) O2 Flow Rate (L/min): 2 l/min (20 0348) CXR: 
 
Admission Weight: Last Weight Weight: 185 lb 10 oz (84.2 kg) Weight: 183 lb 13.8 oz (83.4 kg) Intake / Output / Drain: 
Current Shift:  0701 -  1900 In: 4078 [P.O.:340] Out: 9900 [WPKPA:5200] Last 24 hrs.:  
 
Intake/Output Summary (Last 24 hours) at 2020 1618 Last data filed at 2020 1416 Gross per 24 hour Intake 95508.3 ml Output 11620 ml Net 1726.3 ml No results for input(s): CPK, CKMB, TROIQ in the last 72 hours. Recent Labs  
  20 
1443 20 
0454 20 
2256  20 
1038 20 
1806 NA  --  136  --   --  138 136 K  --  4.2  --   --  4.4 4.1 CO2  --  21  --   --  22 23 BUN  --  17  --   --  19 19 CREA  --  1.26  --   --  1.17 1.36* GLU  --  113*  --   --  95 107* MG  --  1.9  --   --   --   --   
WBC  --  4.0*  --   --   --  3.7* HGB 7.9* 7.9* 7.9*   < > 6.7* 6.2* HCT 25.3* 25.4* 25.5*   < > 22.2* 20.5* PLT  --  158  --   --   --  176  
 < > = values in this interval not displayed. Recent Labs  
  07/04/20 
0454 07/03/20 
1038 07/02/20 
1806 INR 1.3* 1.3* 1.2* PTP 13.2* 13.2* 12.7* APTT  --   --  27.9 No lab exists for component: PBNP Current Facility-Administered Medications:  
  ondansetron (ZOFRAN) injection 4 mg, 4 mg, IntraVENous, Q4H PRN, Levi, Shana B, NP 
  albuterol (PROVENTIL VENTOLIN) nebulizer solution 2.5 mg, 2.5 mg, Nebulization, Q4H PRN, Levi, Shana B, NP 
  docusate sodium (COLACE) capsule 100 mg, 100 mg, Oral, BID, Levi, Shana B, NP, 100 mg at 07/04/20 0915   hydrALAZINE (APRESOLINE) 20 mg/mL injection 10 mg, 10 mg, IntraVENous, Q4H PRN, Levi, Shana B, NP 
  hydrALAZINE (APRESOLINE) 20 mg/mL injection 20 mg, 20 mg, IntraVENous, Q4H PRN, Levi, Shana B, NP 
  0.9% sodium chloride infusion 250 mL, 250 mL, IntraVENous, PRN, Levi, Shana B, NP 
  0.9% sodium chloride infusion 250 mL, 250 mL, IntraVENous, PRN, Paras Cuadra PA-C 
  sodium chloride (NS) flush 5-40 mL, 5-40 mL, IntraVENous, Q8H, Radha Bolanos MD, 10 mL at 07/04/20 1418   sodium chloride (NS) flush 5-40 mL, 5-40 mL, IntraVENous, PRN, Radha Bolanos MD, 10 mL at 07/03/20 1035   tamsulosin (FLOMAX) capsule 0.8 mg, 0.8 mg, Oral, QHS, Radha Bolanos MD, 0.8 mg at 07/03/20 2201 
  venlafaxine-SR (EFFEXOR-XR) capsule 150 mg, 150 mg, Oral, DAILY WITH BREAKFAST, Radha Bolanos MD, 150 mg at 07/04/20 4403   levETIRAcetam (KEPPRA) tablet 250 mg, 250 mg, Oral, BID, Radha Bolanos MD, 250 mg at 07/04/20 0915 
  finasteride (PROSCAR) tablet 5 mg, 5 mg, Oral, DAILY, Radha Bolanos MD, 5 mg at 07/03/20 2201   digoxin (LANOXIN) tablet 0.125 mg, 0.125 mg, Oral, EVERY OTHER DAY, Radha Bolanos MD, 0.125 mg at 07/04/20 6148   ciprofloxacin HCl (CIPRO) tablet 750 mg, 750 mg, Oral, Q12H, Vivian Bar MD, 750 mg at 07/04/20 1125   budesonide (PULMICORT) 500 mcg/2 ml nebulizer suspension, 500 mcg, Nebulization, QPM, Vivian Bar MD, 500 mcg at 07/03/20 1938 
  
A/P Hematuria - urology following S/P LVAD - good flows Need for anticoagulation - heparin / coumadin when appropriate HTN - home meds  
  
Risk of morbidity and mortality - high Medical decision making - high complexity Signed By: Kat Castellanos MD

## 2020-07-04 NOTE — PROGRESS NOTES
0730: Bedside and Verbal shift change report given to Mica Bradford (oncoming nurse) by Manuela Mchugh RN (offgoing nurse). Report included the following information SBAR, Kardex, Intake/Output, MAR, Recent Results, and Cardiac Rhythm nsr, paced . 1440: repeat H&H sent 1740: CBI stopped per Rodolfo Evans MD 
 
1930: Bedside and Verbal shift change report given to Manuela Mchugh RN (oncoming nurse) by Mary Bunch RN (offgoing nurse). Report included the following information SBAR, Kardex, Intake/Output, MAR, Recent Results and Cardiac Rhythm nsr, paced. Problem: Falls - Risk of 
Goal: *Absence of Falls Description: Document Francisco Rankin Fall Risk and appropriate interventions in the flowsheet. Outcome: Progressing Towards Goal 
Note: Fall Risk Interventions: 
  
 
  
 
Medication Interventions: Patient to call before getting OOB Elimination Interventions: Patient to call for help with toileting needs, Call light in reach Problem: Patient Education: Go to Patient Education Activity Goal: Patient/Family Education Outcome: Progressing Towards Goal 
  
Problem: Anemia Care Plan (Adult and Pediatric) Goal: *Labs within defined limits Outcome: Progressing Towards Goal 
Note: HGB stable Goal: *Tolerates increased activity Outcome: Progressing Towards Goal 
  
Problem: Patient Education: Go to Patient Education Activity Goal: Patient/Family Education Outcome: Progressing Towards Goal 
  
Problem: Pressure Injury - Risk of 
Goal: *Prevention of pressure injury Description: Document Mich Scale and appropriate interventions in the flowsheet. Outcome: Progressing Towards Goal 
Note: Pressure Injury Interventions: 
  
 
  
 
  
 
Mobility Interventions: Pressure redistribution bed/mattress (bed type), HOB 30 degrees or less Nutrition Interventions: Document food/fluid/supplement intake Problem: Patient Education: Go to Patient Education Activity Goal: Patient/Family Education Outcome: Progressing Towards Goal

## 2020-07-04 NOTE — PROGRESS NOTES
1930: Bedside shift change report received by Lianet Fulton (offgoing nurse). Report included the following information SBAR, Kardex, Procedure Summary, Intake/Output, MAR, Recent Results, and Cardiac Rhythm NSR/paced . 0730: Bedside shift change report given to Sugey (oncoming nurse). Report included the following information SBAR, Kardex, Procedure Summary, Intake/Output, MAR, Recent Results and Cardiac Rhythm NSR/paced. ---------------------- Care Plan 2051 Problem: Falls - Risk of 
Goal: *Absence of Falls Description: Document My Gutierrez Fall Risk and appropriate interventions in the flowsheet. Outcome: Progressing Towards Goal 
Note: Fall Risk Interventions: 
  
 
  
 
Medication Interventions: Evaluate medications/consider consulting pharmacy, Patient to call before getting OOB, Teach patient to arise slowly Problem: Pressure Injury - Risk of 
Goal: *Prevention of pressure injury Description: Document Mich Scale and appropriate interventions in the flowsheet. Outcome: Progressing Towards Goal 
Note: Pressure Injury Interventions: 
  
 
  
 
  
 
Mobility Interventions: HOB 30 degrees or less, Pressure redistribution bed/mattress (bed type), PT/OT evaluation, Turn and reposition approx. every two hours(pillow and wedges) Nutrition Interventions: Document food/fluid/supplement intake

## 2020-07-04 NOTE — PROGRESS NOTES
[address] [facility] Adult  Hospitalist Group Hospitalist Progress Note Yariel Leonard MD 
Answering service: [phone] from in house phone Date of Service:  7/3/2020 NAME:  Leora Valadez :  1950 MRN:  [id number] Admission Summary:  
Leora Valadez is a 71 y.o. male past medical history significant for nonischemic cardiomyopathy, congestive heart failure with LVAD presented emergency room complaining of blood in the urine. Patient states that for the last 3 weeks ever noticed some blood in his urine but has worsened in the last few days. He is currently on Coumadin and reports compliant with medication. He had blood test ordered by the heart failure clinic and he was found to have a hemoglobin of 6. Interval history / Subjective:  
Patient without complaints Urology consulted but did not see patient today Patient transfused today for low Hb due to hematuria Assessment & Plan: 1. Acute on chronic anemia: likely secondary to hematuria 
- transfused to keep Hbg > 7 
- Trend H/H every 6 hours 
  
2. Hematuria: in the setting of anticoagulation however INR 1.2. 
- Urology consult.  
- CBI, and holding anticoagulation 
  
3. Chronic UTI: on chronic cipro as per ID recommendations. Now with hematuria and wbc but no other sign of infection 
- will send urine for culture - will continue with chronic therapy of cipro 750mg bid 
  
4. ICM - Stage D,  LVEF 15%, NYHA Class IV improved to NYHA Class III s/p HM3 LVAD  
- Heart failure consult to assist with LVAD 
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 
- Continue Entresto 24/26 mg PO BID  
  
5. PAF 
- Off digoxin d/t tremors and not on bb due to d/t RV failure - On warfarin. Subtherapeutic INR but will hold due to hematuria 
  
6.  BPH 
- continue finasteride and tamsulosin 
  
 7. JOSE: order cpap and nocturnal oxygen Code status: FULL 
DVT prophylaxis: SCDs Care Plan discussed with: Patient/Family Anticipated Disposition: Home w/Family Anticipated Discharge: Greater than 48 hours Hospital Problems  Date Reviewed: 3/23/2020 Codes Class Noted POA Anemia ICD-10-CM: D64.9 ICD-9-CM: 285.9  7/2/2020 Unknown Hematuria ICD-10-CM: R31.9 ICD-9-CM: 599.70  7/2/2020 Unknown Review of Systems: A comprehensive review of systems was negative except for that written in the HPI. Vital Signs:  
 Last 24hrs VS reviewed since prior progress note. Most recent are: 
Visit Vitals Pulse 94 Temp 97.8 °F (36.6 °C) Resp 18 Wt 84.2 kg (185 lb 10 oz) SpO2 98% BMI 23.83 kg/m² Intake/Output Summary (Last 24 hours) at 7/3/2020 2342 Last data filed at 7/3/2020 2300 Gross per 24 hour Intake 46704.8 ml Output 7600 ml Net 2518.8 ml Physical Examination:  
 
 
     
Constitutional:  No acute distress, cooperative, pleasant ENT:  Oral mucosa moist, . Resp:  CTA bilaterally. No wheezing/rhonchi/rales. No accessory muscle use CV:  audible LVAD pump GI:  Soft, non distended, non tender. Musculoskeletal:  No edema, warm, 2+ pulses throughout Neurologic:  Moves all extremities. AAOx3, CN II-XII reviewed Psych:  Good insight, Not anxious nor agitated. Data Review:  
 Review and/or order of clinical lab test 
Review and/or order of tests in the radiology section of CPT Review and/or order of tests in the medicine section of CPT Labs:  
 
Recent Labs  
  07/03/20 
2256 07/03/20 
1508  07/02/20 
1806 WBC  --   --   --  3.7* HGB 7.9* 6.7*   < > 6.2* HCT 25.5* 22.3*   < > 20.5* PLT  --   --   --  176  
 < > = values in this interval not displayed. Recent Labs  
  07/03/20 
1038 07/02/20 
1806  136  
K 4.4 4.1 * 107 CO2 22 23 BUN 19 19 CREA 1.17 1.36* GLU 95 107* CA 7.9* 8.1*  
 Recent Labs  
  07/02/20 
1806 ALT 10* * TBILI 0.4 TP 6.7 ALB 2.8*  
GLOB 3.9 Recent Labs  
  07/03/20 
1038 07/02/20 
1806 INR 1.3* 1.2* PTP 13.2* 12.7* APTT  --  27.9 No results for input(s): FE, TIBC, PSAT, FERR in the last 72 hours. Lab Results Component Value Date/Time Folate 16.5 01/16/2020 04:57 AM  
  
No results for input(s): PH, PCO2, PO2 in the last 72 hours. No results for input(s): CPK, CKNDX, TROIQ in the last 72 hours. No lab exists for component: CPKMB Lab Results Component Value Date/Time Cholesterol, total 90 10/26/2019 04:09 AM  
 HDL Cholesterol 21 10/26/2019 04:09 AM  
 LDL, calculated 56.2 10/26/2019 04:09 AM  
 Triglyceride 64 10/26/2019 04:09 AM  
 CHOL/HDL Ratio 4.3 10/26/2019 04:09 AM  
 
Lab Results Component Value Date/Time Glucose (POC) 99 01/27/2020 11:11 AM  
 Glucose (POC) 137 (H) 01/07/2020 11:16 AM  
 Glucose (POC) 115 (H) 01/07/2020 06:52 AM  
 Glucose (POC) 106 (H) 01/06/2020 09:17 PM  
 Glucose (POC) 122 (H) 01/06/2020 04:17 PM  
 
Lab Results Component Value Date/Time Color YELLOW/STRAW 07/02/2020 07:22 PM  
 Appearance CLEAR 07/02/2020 07:22 PM  
 Specific gravity 1.018 07/02/2020 07:22 PM  
 Specific gravity 1.015 10/31/2019 11:00 AM  
 pH (UA) 7.0 07/02/2020 07:22 PM  
 Protein 300 (A) 07/02/2020 07:22 PM  
 Glucose Negative 07/02/2020 07:22 PM  
 Ketone Negative 07/02/2020 07:22 PM  
 Bilirubin Negative 07/02/2020 07:22 PM  
 Urobilinogen 0.2 07/02/2020 07:22 PM  
 Nitrites Negative 07/02/2020 07:22 PM  
 Leukocyte Esterase Negative 07/02/2020 07:22 PM  
 Epithelial cells FEW 07/02/2020 07:22 PM  
 Bacteria Negative 07/02/2020 07:22 PM  
 WBC 10-20 07/02/2020 07:22 PM  
 RBC >100 (H) 07/02/2020 07:22 PM  
 
 
 
Medications Reviewed:  
 
Current Facility-Administered Medications Medication Dose Route Frequency  ondansetron (ZOFRAN) injection 4 mg  4 mg IntraVENous Q4H PRN  
  albuterol (PROVENTIL VENTOLIN) nebulizer solution 2.5 mg  2.5 mg Nebulization Q4H PRN  
 docusate sodium (COLACE) capsule 100 mg  100 mg Oral BID  hydrALAZINE (APRESOLINE) 20 mg/mL injection 10 mg  10 mg IntraVENous Q4H PRN  
 hydrALAZINE (APRESOLINE) 20 mg/mL injection 20 mg  20 mg IntraVENous Q4H PRN  
 0.9% sodium chloride infusion 250 mL  250 mL IntraVENous PRN  
 0.9% sodium chloride infusion 250 mL  250 mL IntraVENous PRN  
 sodium chloride (NS) flush 5-40 mL  5-40 mL IntraVENous Q8H  
 sodium chloride (NS) flush 5-40 mL  5-40 mL IntraVENous PRN  
 tamsulosin (FLOMAX) capsule 0.8 mg  0.8 mg Oral QHS  venlafaxine-SR (EFFEXOR-XR) capsule 150 mg  150 mg Oral DAILY WITH BREAKFAST  levETIRAcetam (KEPPRA) tablet 250 mg  250 mg Oral BID  finasteride (PROSCAR) tablet 5 mg  5 mg Oral DAILY  [START ON 7/4/2020] digoxin (LANOXIN) tablet 0.125 mg  0.125 mg Oral EVERY OTHER DAY  ciprofloxacin HCl (CIPRO) tablet 750 mg  750 mg Oral Q12H  
 budesonide (PULMICORT) 500 mcg/2 ml nebulizer suspension  500 mcg Nebulization QPM  
 
______________________________________________________________________ EXPECTED LENGTH OF STAY: - - - 
ACTUAL LENGTH OF STAY:          1 Nava Ta MD

## 2020-07-04 NOTE — PROGRESS NOTES
1930: Bedside shift change report received by Rocio Dong (offgoing nurse). Report included the following information SBAR, Kardex, Procedure Summary, Intake/Output, MAR, Recent Results, and Cardiac Rhythm SR/paced . 2300: Pt's urine is noted to be reddening up again. Paged and notified Kalen Francis NP with hospitalists. NP verbalized understanding. States to restart CBI. Repeated back and verified. 0730: Bedside shift change report given to Rocio Dong (oncoming nurse). Report included the following information SBAR, Kardex, Procedure Summary, Intake/Output, MAR, Recent Results and Cardiac Rhythm SR/paced. ----------------- [facility] Problem: Falls - Risk of 
Goal: *Absence of Falls Description: Document Shauna Spicer Fall Risk and appropriate interventions in the flowsheet. 7/4/2020 1958 by Marin Pride, RN Outcome: Progressing Towards Goal 
Note: Fall Risk Interventions: 
  
 
  
 
Medication Interventions: Evaluate medications/consider consulting pharmacy, Patient to call before getting OOB, Teach patient to arise slowly Elimination Interventions: Call light in reach, Stay With Me (per policy), Toilet paper/wipes in reach, Toileting schedule/hourly rounds 7/4/2020 0717 by Marin Pride, RN Outcome: Progressing Towards Goal 
Note: Fall Risk Interventions: 
  
 
  
 
Medication Interventions: Evaluate medications/consider consulting pharmacy, Patient to call before getting OOB, Teach patient to arise slowly Problem: Pressure Injury - Risk of 
Goal: *Prevention of pressure injury Description: Document Mich Scale and appropriate interventions in the flowsheet. 7/4/2020 1958 by Marin Pride, RN Outcome: Progressing Towards Goal 
Note: Pressure Injury Interventions: Activity Interventions: Increase time out of bed, Pressure redistribution bed/mattress(bed type), PT/OT evaluation Mobility Interventions: HOB 30 degrees or less, Pressure redistribution bed/mattress (bed type), PT/OT evaluation Nutrition Interventions: Document food/fluid/supplement intake 7/4/2020 0717 by Baylee Dailey RN Outcome: Progressing Towards Goal 
Note: Pressure Injury Interventions: 
  
 
  
 
  
 
Mobility Interventions: HOB 30 degrees or less, Pressure redistribution bed/mattress (bed type), PT/OT evaluation, Turn and reposition approx. every two hours(pillow and wedges) Nutrition Interventions: Document food/fluid/supplement intake

## 2020-07-05 NOTE — PROGRESS NOTES
0730: Bedside and Verbal shift change report given to 83 Jeny Bradford (oncoming nurse) by Katelyn Rascon RN (offgoing nurse). Report included the following information SBAR, Kardex, Intake/Output, MAR, Recent Results, and Cardiac Rhythm nsr . 1045: pt up to chair w one assist, tolerated well, ambulated in room 1453: pt ambulated 250 ft on unit with one assist and walker. Tolerated well. 1545: CBI stopped per Miles Pooja ACEVES, okay to restart CBI if urine increases in redness again. 1834: LVAD dressing changed using sterile procedure, pt tolerated well, slight redness noted at driveline site. 1930: Bedside and Verbal shift change report given to 1161 Darrion Cedeño (oncoming nurse) by Kurt Mckeon RN (offgoing nurse). Report included the following information SBAR, Kardex, Intake/Output, MAR, Recent Results and Cardiac Rhythm paced, nsr. Problem: Falls - Risk of 
Goal: *Absence of Falls Description: Document Luann Tilden Fall Risk and appropriate interventions in the flowsheet. Outcome: Progressing Towards Goal 
Note: Fall Risk Interventions: 
  
 
  
 
Medication Interventions: Teach patient to arise slowly, Patient to call before getting OOB Elimination Interventions: Patient to call for help with toileting needs, Call light in reach Problem: Patient Education: Go to Patient Education Activity Goal: Patient/Family Education Outcome: Progressing Towards Goal 
  
Problem: Anemia Care Plan (Adult and Pediatric) Goal: *Labs within defined limits Outcome: Progressing Towards Goal 
Goal: *Tolerates increased activity Outcome: Progressing Towards Goal 
  
Problem: Patient Education: Go to Patient Education Activity Goal: Patient/Family Education Outcome: Progressing Towards Goal 
  
Problem: Pressure Injury - Risk of 
Goal: *Prevention of pressure injury Description: Document Mich Scale and appropriate interventions in the flowsheet. Outcome: Progressing Towards Goal 
Note: Pressure Injury Interventions: Activity Interventions: Pressure redistribution bed/mattress(bed type), Increase time out of bed Mobility Interventions: Pressure redistribution bed/mattress (bed type), HOB 30 degrees or less Nutrition Interventions: Document food/fluid/supplement intake Problem: Patient Education: Go to Patient Education Activity Goal: Patient/Family Education Outcome: Progressing Towards Goal

## 2020-07-05 NOTE — PROGRESS NOTES
[address] [facility] Adult  Hospitalist Group Hospitalist Progress Note Yuliana Cervantes MD 
Answering service: [phone] from in house phone Date of Service:  2020 NAME:  Ede Jarquin :  1950 MRN:  [id number] Admission Summary:  
Ede Jarquin is a 71 y.o. male past medical history significant for nonischemic cardiomyopathy, congestive heart failure with LVAD presented emergency room complaining of blood in the urine. Patient states that for the last 3 weeks ever noticed some blood in his urine but has worsened in the last few days. He is currently on Coumadin and reports compliant with medication. He had blood test ordered by the heart failure clinic and he was found to have a hemoglobin of 6. Interval history / Subjective:  
Patient without complaints Urology saw patient today- recs prior to DC = CT with IV pyelogram 
Patient transfused for low Hb due to hematuria Assessment & Plan: 1. Acute on chronic anemia: likely secondary to hematuria 
- transfused to keep Hbg > 7 
- Monitor Hb- 7.9 stable 
  
2. Hematuria: in the setting of anticoagulation however INR was only 1.2. 
- Urology consulted- recs for CT with IVP prior to DC.  
- CBI, and holding anticoagulation 
  
3. Chronic UTI: on chronic cipro as per ID recommendations. Now with hematuria and wbc but no other sign of infection 
- will send urine for culture - will continue with chronic therapy of cipro 750mg bid 
  
4. ICM - Stage D,  LVEF 15%, NYHA Class IV improved to NYHA Class III s/p HM3 LVAD  
- Heart failure consult to assist with LVAD 
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 
- Continue Entresto 24/26 mg PO BID  
  
5. PAF 
- Off digoxin d/t tremors and not on bb due to d/t RV failure - On warfarin.  Subtherapeutic INR but will hold due to hematuria 
  
 6. BPH 
- continue finasteride and tamsulosin 
  
7. JOSE: holding off on cpap and resumed nocturnal oxygen Stable SO2 levels overnight Code status: FULL 
DVT prophylaxis: SCDs Care Plan discussed with: Patient/Family Anticipated Disposition: Home w/Family Anticipated Discharge: Greater than 48 hours Hospital Problems  Date Reviewed: 3/23/2020 Codes Class Noted POA Anemia ICD-10-CM: D64.9 ICD-9-CM: 285.9  7/2/2020 Unknown Hematuria ICD-10-CM: R31.9 ICD-9-CM: 599.70  7/2/2020 Unknown Review of Systems: A comprehensive review of systems was negative except for that written in the HPI. Vital Signs:  
 Last 24hrs VS reviewed since prior progress note. Most recent are: 
Visit Vitals Pulse 92 Temp 98.5 °F (36.9 °C) Resp 20 Wt 83.4 kg (183 lb 13.8 oz) SpO2 100% BMI 23.61 kg/m² Intake/Output Summary (Last 24 hours) at 7/4/2020 2143 Last data filed at 7/4/2020 1532 Gross per 24 hour Intake 30696 ml Output 96673 ml Net 960 ml Physical Examination:  
 
 
     
Constitutional:  No acute distress, cooperative, pleasant ENT:  Oral mucosa moist, . Resp:  CTA bilaterally. No wheezing/rhonchi/rales. No accessory muscle use CV:  audible LVAD pump GI:  Soft, non distended, non tender. Musculoskeletal:  No edema, warm, 2+ pulses throughout Neurologic:  Moves all extremities. AAOx3, CN II-XII reviewed Psych:  Good insight, Not anxious nor agitated. Data Review:  
 Review and/or order of clinical lab test 
Review and/or order of tests in the radiology section of CPT Review and/or order of tests in the medicine section of CPT Labs:  
 
Recent Labs  
  07/04/20 
1443 07/04/20 
0454  07/02/20 
1806 WBC  --  4.0*  --  3.7* HGB 7.9* 7.9*   < > 6.2* HCT 25.3* 25.4*   < > 20.5* PLT  --  158  --  176  
 < > = values in this interval not displayed. Recent Labs  
  07/04/20 
0454 07/03/20 
1038 07/02/20 
1806  138 136  
K 4.2 4.4 4.1 * 110* 107 CO2 21 22 23 BUN 17 19 19 CREA 1.26 1.17 1.36* * 95 107* CA 7.9* 7.9* 8.1*  
MG 1.9  --   --   
 
Recent Labs  
  07/04/20 
0454 07/02/20 
1806 ALT 10* 10*  135* TBILI 0.6 0.4 TP 6.3* 6.7 ALB 2.5* 2.8*  
GLOB 3.8 3.9 Recent Labs  
  07/04/20 
0454 07/03/20 
1038 07/02/20 
1806 INR 1.3* 1.3* 1.2* PTP 13.2* 13.2* 12.7* APTT  --   --  27.9 No results for input(s): FE, TIBC, PSAT, FERR in the last 72 hours. Lab Results Component Value Date/Time Folate 16.5 01/16/2020 04:57 AM  
  
No results for input(s): PH, PCO2, PO2 in the last 72 hours. No results for input(s): CPK, CKNDX, TROIQ in the last 72 hours. No lab exists for component: CPKMB Lab Results Component Value Date/Time Cholesterol, total 90 10/26/2019 04:09 AM  
 HDL Cholesterol 21 10/26/2019 04:09 AM  
 LDL, calculated 56.2 10/26/2019 04:09 AM  
 Triglyceride 64 10/26/2019 04:09 AM  
 CHOL/HDL Ratio 4.3 10/26/2019 04:09 AM  
 
Lab Results Component Value Date/Time Glucose (POC) 99 01/27/2020 11:11 AM  
 Glucose (POC) 137 (H) 01/07/2020 11:16 AM  
 Glucose (POC) 115 (H) 01/07/2020 06:52 AM  
 Glucose (POC) 106 (H) 01/06/2020 09:17 PM  
 Glucose (POC) 122 (H) 01/06/2020 04:17 PM  
 
Lab Results Component Value Date/Time  Color YELLOW/STRAW 07/02/2020 07:22 PM  
 Appearance CLEAR 07/02/2020 07:22 PM  
 Specific gravity 1.018 07/02/2020 07:22 PM  
 Specific gravity 1.015 10/31/2019 11:00 AM  
 pH (UA) 7.0 07/02/2020 07:22 PM  
 Protein 300 (A) 07/02/2020 07:22 PM  
 Glucose Negative 07/02/2020 07:22 PM  
 Ketone Negative 07/02/2020 07:22 PM  
 Bilirubin Negative 07/02/2020 07:22 PM  
 Urobilinogen 0.2 07/02/2020 07:22 PM  
 Nitrites Negative 07/02/2020 07:22 PM  
 Leukocyte Esterase Negative 07/02/2020 07:22 PM  
 Epithelial cells FEW 07/02/2020 07:22 PM  
 Bacteria Negative 07/02/2020 07:22 PM  
 WBC 10-20 07/02/2020 07:22 PM  
 RBC >100 (H) 07/02/2020 07:22 PM  
 
 
 
Medications Reviewed:  
 
Current Facility-Administered Medications Medication Dose Route Frequency  ondansetron (ZOFRAN) injection 4 mg  4 mg IntraVENous Q4H PRN  
 albuterol (PROVENTIL VENTOLIN) nebulizer solution 2.5 mg  2.5 mg Nebulization Q4H PRN  
 docusate sodium (COLACE) capsule 100 mg  100 mg Oral BID  hydrALAZINE (APRESOLINE) 20 mg/mL injection 10 mg  10 mg IntraVENous Q4H PRN  
 hydrALAZINE (APRESOLINE) 20 mg/mL injection 20 mg  20 mg IntraVENous Q4H PRN  
 0.9% sodium chloride infusion 250 mL  250 mL IntraVENous PRN  
 0.9% sodium chloride infusion 250 mL  250 mL IntraVENous PRN  
 sodium chloride (NS) flush 5-40 mL  5-40 mL IntraVENous Q8H  
 sodium chloride (NS) flush 5-40 mL  5-40 mL IntraVENous PRN  
 tamsulosin (FLOMAX) capsule 0.8 mg  0.8 mg Oral QHS  venlafaxine-SR (EFFEXOR-XR) capsule 150 mg  150 mg Oral DAILY WITH BREAKFAST  levETIRAcetam (KEPPRA) tablet 250 mg  250 mg Oral BID  finasteride (PROSCAR) tablet 5 mg  5 mg Oral DAILY  digoxin (LANOXIN) tablet 0.125 mg  0.125 mg Oral EVERY OTHER DAY  ciprofloxacin HCl (CIPRO) tablet 750 mg  750 mg Oral Q12H  
 budesonide (PULMICORT) 500 mcg/2 ml nebulizer suspension  500 mcg Nebulization QPM  
 
______________________________________________________________________ EXPECTED LENGTH OF STAY: - - - 
ACTUAL LENGTH OF STAY:          2 Stormy Bello MD

## 2020-07-05 NOTE — PROGRESS NOTES
[facility] VAD/Heart Failure Progress Note Admit Date: 2020 POD:  * No surgery found * Procedure:      
 
 Subjective:  
Mild dyspnea and weakness; hematuria;  
 
 Objective:  
Vitals: 
Pulse 81, temperature 98.2 °F (36.8 °C), resp. rate 20, weight 182 lb 8.7 oz (82.8 kg), SpO2 96 %. Temp (24hrs), Av.2 °F (36.8 °C), Min:97.3 °F (36.3 °C), Max:98.6 °F (37 °C) Hemodynamics: 
 CO:   
 CI:   
 CVP:   
 SVR:   
 PAP Systolic:   
 PAP Diastolic:   
 PVR:   
 PL54:   
 SCV02:   
 
VAD Interrogation: LVAD (Heartmate) Pump Speed (RPM): 5800 Pump Flow (LPM): 4.9 Chatter in Lines: No 
PI (Pulsitility Index): 3.5 Power: 4.5 MAP: 94  Test: Yes 
Back Up  at Bedside & Labeled: Yes Power Module Test: Yes Driveline Site Care: No 
Driveline Dressing: Clean, Dry, and Intact EKG/Rhythm:   
 
Extubation Date / Time:  
 
CT Output:  
 
Ventilator: 
  
 
Oxygen Therapy: 
Oxygen Therapy O2 Sat (%): 96 % (20 0804) Pulse via Oximetry: 82 beats per minute (20 1701) O2 Device: Room air (20 0804) O2 Flow Rate (L/min): 2 l/min (20 0348) CXR: 
 
Admission Weight: Last Weight Weight: 185 lb 10 oz (84.2 kg) Weight: 182 lb 8.7 oz (82.8 kg) Intake / Output / Drain: 
Current Shift: 701 -  1900 In: 1547 [P.O.:240] Out: - Last 24 hrs.:  
 
Intake/Output Summary (Last 24 hours) at 2020 1120 Last data filed at 2020 1617 Gross per 24 hour Intake 05818 ml Output 95220 ml Net 1560 ml No results for input(s): CPK, CKMB, TROIQ in the last 72 hours. Recent Labs  
  20 
0309 20 
1443 20 
0454  20 
1038 20 
1806   --  136  --  138 136  
K 4.2  --  4.2  --  4.4 4.1 CO2 23  --  21  --  22 23 BUN 18  --  17  --  19 19 CREA 1.19  --  1.26  --  1.17 1.36* GLU 93  --  113*  --  95 107* MG 2.0  --  1.9  --   --   --   
WBC 3.6*  --  4.0*  --   --  3.7*  
 HGB 8.0* 7.9* 7.9*   < > 6.7* 6.2* HCT 26.2* 25.3* 25.4*   < > 22.2* 20.5*   --  158  --   --  176  
 < > = values in this interval not displayed. Recent Labs  
  07/05/20 
0309 07/04/20 
0454 07/03/20 
1038 07/02/20 
1806 INR 1.2* 1.3* 1.3* 1.2* PTP 12.4* 13.2* 13.2* 12.7* APTT  --   --   --  27.9 No lab exists for component: PBNP Current Facility-Administered Medications:  
  fluticasone propionate (FLONASE) 50 mcg/actuation nasal spray 2 Spray, 2 Spray, Both Nostrils, DAILY, Levi, Shana B, NP 
  ondansetron (ZOFRAN) injection 4 mg, 4 mg, IntraVENous, Q4H PRN, Levi, Shana B, NP 
  albuterol (PROVENTIL VENTOLIN) nebulizer solution 2.5 mg, 2.5 mg, Nebulization, Q4H PRN, Levi, Shana B, NP 
  docusate sodium (COLACE) capsule 100 mg, 100 mg, Oral, BID, Levi, Shana B, NP, 100 mg at 07/05/20 7577   hydrALAZINE (APRESOLINE) 20 mg/mL injection 10 mg, 10 mg, IntraVENous, Q4H PRN, Levi, Shana B, NP 
  hydrALAZINE (APRESOLINE) 20 mg/mL injection 20 mg, 20 mg, IntraVENous, Q4H PRN, Levi, Shana B, NP 
  0.9% sodium chloride infusion 250 mL, 250 mL, IntraVENous, PRN, Levi, Shana B, NP 
  0.9% sodium chloride infusion 250 mL, 250 mL, IntraVENous, PRN, Shyanne Singletary PA-C 
  sodium chloride (NS) flush 5-40 mL, 5-40 mL, IntraVENous, Q8H, Jennifer Santamaria MD, 10 mL at 07/05/20 3813   sodium chloride (NS) flush 5-40 mL, 5-40 mL, IntraVENous, PRN, Jennifer Santamaria MD, 10 mL at 07/03/20 1035   tamsulosin (FLOMAX) capsule 0.8 mg, 0.8 mg, Oral, QHS, Jennifer Santamaria MD, 0.8 mg at 07/04/20 2252   venlafaxine-SR (EFFEXOR-XR) capsule 150 mg, 150 mg, Oral, DAILY WITH BREAKFAST, Jennifer Santamaria MD, 150 mg at 07/05/20 8201   levETIRAcetam (KEPPRA) tablet 250 mg, 250 mg, Oral, BID, Jennifer Santamaria MD, 250 mg at 07/05/20 2571   finasteride (PROSCAR) tablet 5 mg, 5 mg, Oral, DAILY, Jennifer Santamaria MD, 5 mg at 07/04/20 2252   digoxin (LANOXIN) tablet 0.125 mg, 0.125 mg, Oral, EVERY OTHER DAY, James Garcia MD, 0.125 mg at 07/04/20 7709   ciprofloxacin HCl (CIPRO) tablet 750 mg, 750 mg, Oral, Q12H, James Garcia MD, 750 mg at 07/04/20 1027   budesonide (PULMICORT) 500 mcg/2 ml nebulizer suspension, 500 mcg, Nebulization, QPM, James Garcia MD, 500 mcg at 07/04/20 1701 A/P Hematuria - urology following S/P LVAD - good flows Need for anticoagulation - heparin / coumadin when appropriate HTN - home meds  
  
Risk of morbidity and mortality - high Medical decision making - high complexity 
  
 
Signed By: Ceasar Puckett MD

## 2020-07-05 NOTE — PROGRESS NOTES
[address] [facility] Inpatient Progress Note Patient name: Verenice Keen Patient : 1950 Patient MRN: [id number] Date of service: 20 CHIEF COMPLAINT: 
Chief Complaint Patient presents with  Blood in Urine HPI: Razia Kiser is a 71y.o. year old white male with a history of HTN, HLD, JOSE, CAD s/p cardiac arrest VFib s/p CABG () c/b sternal would infection and sternectomy, ischemic cardiomyopathy LVEF 15-20%, s/p BiVICD  who underwent LVAD as DT on 2019 after bridging with Impella 5.0.  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 He was transferred to [facility] rehab on  and discharged from rehab on 2020. Julio Cesar Cooper was seen  for an LVAD follow up visit. Due to fever, chills, and hypotension, he was referred to the ED, evaluated and admitted to [address] [place] [address]. He underwent treatment for recurrent pseudomonas bacteremia due to UTI. His Cipro was resumed and he was discharged home in stable condition. Mr. Natanael Dunaway presented to ED after routine labs showed a hgb drop from 10 to 6.8, he states he has had hematuria on and off for 2 weeks. He did note that he was more fatigued when working with PT at home. He denies other acute complaints. 24Hr Events Hematuria improved No acute overnight events Off AC Assessment/Plan: NYHA Class II Acute blood loss anemia due to hematuria Hgb stable post 2 unit PRBC transfusion Transfuse to keep hgb> 7 Urology consult Continuous bladder irrigation per urology Ok with CT w/ IV pyelogram  
 Hold AC for now- will need to resume soon ICM - Stage D,  LVEF 15%, NYHA Class IV improved to NYHA Class II s/p HM3 LVAD as DT on 19 with Impella 5.0 as bridge to LVAD  Continue LVAD speed at 5800 rpm  
 TTE 4/20 shows large LVIDd, 6.84 cm, RVIDd 3.06 cm, TAPSE 1.15 cm, severely elevated CVP Continue digoxin 0.125 mg PO daily to support RV through infection Intolerant to BB d/t RV failure Intolerant to [place] due to persistent orthostasis despite increased fluid intake Intolerant to spironolactone due to IVVD and hyponatremia Monitor CardioMEMs readings HTN, goal MAP 70-90 mmHg Discontinue Entresto due to orthostasis MAPs on LUE only d/t previous right axillary Impella cannulation H/o pseudomonal infection [facility] 4/20 - 2/4 bottles growing pseudomonas UA negative for bacteria Continue cipro indefinitely Chronic anticoagulation, goal INR 1.5-2.0 Hold ASA and coumadin due to hematuria for now Will need to resume at some point Monitor MSSA drive line infection Repeat culture from 5/7/20- MSSA Continue Augmentin indefinitely Gentamicin to exit site with daily dressing changes CT x 3 reviewed by Dr. Tatum Carroll - small fluid collection adjacent to previous real drain tract has resolved Continue daily dressing changes Trend CTs Tremors Resolved Continue keppra 250 mg BID 
  weaned off clonazepam  
 
BPH On finasteride and tamsulosin PSA 0.4 on 7/5 Depression On Effexor -  mg daily Persistently elevated CEA Persistently elevated CEA; PET scan shows increased activity RML of lung Oncology consult appreciated - PET not suggestive of malignancy Orthostatic Hypotension Persistent Off  [place] Encourage use of CAROL hose PAF Digoxin 0.125 mg PO daily Intolerant of BB d/t RV failure On warfarin- on hold due to hematuria Dig level 0.7 Debility Improving PT eval  
 
All other care per primary. CARDIAC IMAGING: 
Chest CTA- no outflow graft occlusion Pet Scan equivocal for amyloid 04/20/20 ECHO ADULT COMPLETE 04/21/2020 4/21/2020 Narrative · Image quality for this study was technically difficult. · Normal wall thickness. Severely dilated left ventricle. Severe systolic  
function. Estimated left ventricular ejection fraction is <15%. LVAD  
present. 5800 rpm 
· Dilated left atrium. · RV Not well visualized. Mildly reduced systolic function. · Mild aortic valve regurgitation is present. Aortic valve does not open  
in systole. · Mild mitral valve regurgitation is present. · Mild to moderate tricuspid valve regurgitation is present. · Severely elevated central venous pressure (15+ mmHg); IVC diameter is  
larger than 21 mm and collapses less than 50% with respiration. Signed by: Hair Kent MD  
 
 
 
10/25/19 ECHO ADULT COMPLETE 01/29/2020 1/29/2020 Narrative · Severely dilated left ventricle. Upper normal wall thickness. Severe  
systolic dysfunction. Estimated left ventricular ejection fraction is  
<15%. Left ventricular diastolic dysfunction. · Mitral valve thickening. Minimal mitral valve prolapse of the anterior  
mitral valve leaflet. Trace mitral valve regurgitation is present. · Mild tricuspid valve regurgitation is present. · Mildly dilated right ventricle. Moderately reduced RV systolic function (TAPSE 1.3 cm, RV S' 6 cm/s). Pacer/ICD present. · Mildly biatrial enlargement · Severely elevated central venous pressure (15+ mmHg); IVC diameter is  
larger than 21 mm and collapses less than 50% with respiration. · LVAD inflow cannula spectral Doppler tracings not obtained. Signed by: Sloane Pacheco MD  
 
  
  
OTHER IMAGING: 
Head CT negative for acute process EEG suggestive of mild generalized encephalopathic process, possibly related to underlying structural brain injury vs metabolic abnormality Review of Systems Constitutional: Negative for chills, fever and malaise/fatigue. Respiratory: Negative for cough, sputum production and shortness of breath. Cardiovascular: Negative for chest pain, palpitations, leg swelling and PND. + AICD shock Gastrointestinal: Negative for blood in stool, heartburn, nausea and vomiting. Genitourinary: Positive for hematuria. Musculoskeletal: Negative. Neurological: Negative for dizziness, weakness and headaches. Endo/Heme/Allergies: Does not bruise/bleed easily. Psychiatric/Behavioral: Negative. Visit Vitals Pulse 77 Temp 97.3 °F (36.3 °C) Resp 20 Wt 182 lb 8.7 oz (82.8 kg) SpO2 98% BMI 23.44 kg/m² LVAD Pump Speed (RPM): 5800 Pump Flow (LPM): 4.7 MAP: 100 PI (Pulsitility Index): 3.5 Power: 4.4  Test: No 
Back Up  at Bedside & Labeled: Yes Power Module Test: No 
Driveline Site Care: No 
Driveline Dressing: Clean, Dry, and Intact Outpatient: No 
MAP in Therapeutic Range (Outpatient): No 
Testing Alarms Reviewed: Yes 
Back up SC speed: 5800 Back up Low Speed Limit: 5400 Emergency Equipment with Patient?: Yes Emergency procedures reviewed?: No 
Drive line site inspected?: No(covered by dressing) Drive line intergrity inspected?: Yes Drive line dressing changed?: No 
  
 
Physical Exam  
Constitutional: He is oriented to person, place, and time. He appears well-developed and well-nourished. No distress. Eyes: Pupils are equal, round, and reactive to light. Neck: Normal range of motion. No JVD present. Cardiovascular: Normal rate, regular rhythm and normal heart sounds. + VAD hum Pulmonary/Chest: Effort normal and breath sounds normal. No respiratory distress. Abdominal: Soft. Bowel sounds are normal. He exhibits no distension. Musculoskeletal: Normal range of motion. General: No edema. Comments: +1BLE- improved Neurological: He is alert and oriented to person, place, and time. Skin: Skin is warm and dry. Drive line site- + erythema, edema, mod purulent drainage Psychiatric: He has a normal mood and affect. Nursing note and vitals reviewed. Recent Results (from the past 12 hour(s)) METABOLIC PANEL, COMPREHENSIVE Collection Time: 07/05/20  3:09 AM  
Result Value Ref Range Sodium 136 136 - 145 mmol/L Potassium 4.2 3.5 - 5.1 mmol/L Chloride 107 97 - 108 mmol/L  
 CO2 23 21 - 32 mmol/L Anion gap 6 5 - 15 mmol/L Glucose 93 65 - 100 mg/dL BUN 18 6 - 20 MG/DL Creatinine 1.19 0.70 - 1.30 MG/DL  
 BUN/Creatinine ratio 15 12 - 20 GFR est AA >60 >60 ml/min/1.73m2 GFR est non-AA >60 >60 ml/min/1.73m2 Calcium 7.5 (L) 8.5 - 10.1 MG/DL Bilirubin, total 0.4 0.2 - 1.0 MG/DL  
 ALT (SGPT) 8 (L) 12 - 78 U/L  
 AST (SGOT) 8 (L) 15 - 37 U/L Alk. phosphatase 116 45 - 117 U/L Protein, total 5.6 (L) 6.4 - 8.2 g/dL Albumin 2.4 (L) 3.5 - 5.0 g/dL Globulin 3.2 2.0 - 4.0 g/dL A-G Ratio 0.8 (L) 1.1 - 2.2 MAGNESIUM Collection Time: 07/05/20  3:09 AM  
Result Value Ref Range Magnesium 2.0 1.6 - 2.4 mg/dL LD Collection Time: 07/05/20  3:09 AM  
Result Value Ref Range  85 - 241 U/L  
PROTHROMBIN TIME + INR Collection Time: 07/05/20  3:09 AM  
Result Value Ref Range INR 1.2 (H) 0.9 - 1.1 Prothrombin time 12.4 (H) 9.0 - 11.1 sec CBC W/O DIFF Collection Time: 07/05/20  3:09 AM  
Result Value Ref Range WBC 3.6 (L) 4.1 - 11.1 K/uL  
 RBC 2.63 (L) 4.10 - 5.70 M/uL HGB 8.0 (L) 12.1 - 17.0 g/dL HCT 26.2 (L) 36.6 - 50.3 % MCV 99.6 (H) 80.0 - 99.0 FL  
 MCH 30.4 26.0 - 34.0 PG  
 MCHC 30.5 30.0 - 36.5 g/dL  
 RDW 17.4 (H) 11.5 - 14.5 % PLATELET 915 989 - 461 K/uL MPV 9.2 8.9 - 12.9 FL  
 NRBC 0.0 0  WBC ABSOLUTE NRBC 0.00 0.00 - 0.01 K/uL NT-PRO BNP Collection Time: 07/05/20  3:09 AM  
Result Value Ref Range NT pro-BNP 6,700 (H) <125 PG/ML  
PSA SCREENING (SCREENING) Collection Time: 07/05/20  3:09 AM  
Result Value Ref Range Prostate Specific Ag 0.4 0.01 - 4.0 ng/mL SAMPLES BEING HELD Collection Time: 07/05/20  3:12 AM  
Result Value Ref Range SAMPLES BEING HELD 1red COMMENT Add-on orders for these samples will be processed based on acceptable specimen integrity and analyte stability, which may vary by analyte. PAST MEDICAL HISTORY: 
Past Medical History:  
Diagnosis Date  Degenerative disc disease, lumbar  Heart failure ([facility] [address]  High cholesterol  Hypertension  Paroxysmal atrial fibrillation ([facility] [address] 4/2/2019  Spinal stenosis PAST SURGICAL HISTORY: 
Past Surgical History:  
Procedure Laterality Date  COLONOSCOPY Left 11/11/2019 COLONOSCOPY at bedside performed by Lazaro Childers MD at [address]  HX CORONARY ARTERY BYPASS GRAFT    
 triple  HX HEART ASSIST DEVICE Left 10/29/2019 Impella 5.0  
 HX HEART ASSIST DEVICE Left 11/18/2019 HeartMate 3  
 HX HERNIA REPAIR    
 HX IMPLANTABLE CARDIOVERTER DEFIBRILLATOR  HX THROMBECTOMY Left 11/25/2019 Left brachial thrombectomy  GA CARDIOVERSION ELECTIVE ARRHYTHMIA EXTERNAL N/A 6/10/2019 EP CARDIOVERSION performed by Patricia Solano MD at [address] CATH LAB  GA CARDIOVERSION ELECTIVE ARRHYTHMIA EXTERNAL N/A 6/18/2019 EP [facility] [address] [facility]  GA INSJ/RPLCMT PERM DFB W/TRNSVNS LDS 1/DUAL CHMBR N/A 6/21/2019 INSERT ICD BIV MULTI [facility] [address] [facility]  GA TCAT IMPL WRLS P-ART PRS SNR L-T HEMODYN MNTR N/A 9/18/2019 IMPLANT HEART FAILURE MONITORING DEVICE [place] [address] CATH LAB FAMILY HISTORY: 
Family History Problem Relation Age of Onset  Lung Disease Mother  Hypertension Mother Leldon Neas Arthritis-osteo Mother  Heart Disease Mother  Heart Disease Father  Diabetes Father SOCIAL HISTORY: 
Social History Socioeconomic History  Marital status:  Spouse name: Not on file  Number of children: Not on file  Years of education: Not on file  Highest education level: Not on file Tobacco Use  Smoking status: Former Smoker Last attempt to quit: 2010 Years since quittin.6  Smokeless tobacco: Never Used Substance and Sexual Activity  Alcohol use: Not Currently Comment: rarely  Drug use: Never Other Topics Concern ALLERGY: 
Allergies Allergen Reactions  Cefepime Other (comments)  
  myoclonus CURRENT MEDICATIONS: 
 
Current Facility-Administered Medications:  
  ondansetron (ZOFRAN) injection 4 mg, 4 mg, IntraVENous, Q4H PRN, Levi, Shana B, NP 
  albuterol (PROVENTIL VENTOLIN) nebulizer solution 2.5 mg, 2.5 mg, Nebulization, Q4H PRN, Levi, Shana B, NP 
  docusate sodium (COLACE) capsule 100 mg, 100 mg, Oral, BID, Levi, Shana B, NP, 100 mg at 20 1708   hydrALAZINE (APRESOLINE) 20 mg/mL injection 10 mg, 10 mg, IntraVENous, Q4H PRN, Levi, Shana B, NP 
  hydrALAZINE (APRESOLINE) 20 mg/mL injection 20 mg, 20 mg, IntraVENous, Q4H PRN, Levi, Shana B, NP 
  0.9% sodium chloride infusion 250 mL, 250 mL, IntraVENous, PRN, Levi, Shana B, NP 
  0.9% sodium chloride infusion 250 mL, 250 mL, IntraVENous, PRN, Miryam Kenyon PA-C 
  sodium chloride (NS) flush 5-40 mL, 5-40 mL, IntraVENous, Q8H, Binta Hassan MD, 10 mL at 20 1410   sodium chloride (NS) flush 5-40 mL, 5-40 mL, IntraVENous, PRN, Binta Hassan MD, 10 mL at 20 1035   tamsulosin (FLOMAX) capsule 0.8 mg, 0.8 mg, Oral, QHS, Binta Hassan MD, 0.8 mg at 20 2252   venlafaxine-SR (EFFEXOR-XR) capsule 150 mg, 150 mg, Oral, DAILY WITH BREAKFAST, Binta Hassan MD, 150 mg at 20 4437   levETIRAcetam (KEPPRA) tablet 250 mg, 250 mg, Oral, BID, Binta Hassan MD, 250 mg at 20 1708   finasteride (PROSCAR) tablet 5 mg, 5 mg, Oral, DAILY, Binta Hassan MD, 5 mg at 20 7031   digoxin (LANOXIN) tablet 0.125 mg, 0.125 mg, Oral, EVERY OTHER DAY, Janie Mahajan MD, 0.125 mg at 07/04/20 3241   ciprofloxacin HCl (CIPRO) tablet 750 mg, 750 mg, Oral, Q12H, Janie Mahajan MD, 750 mg at 07/04/20 2252   budesonide (PULMICORT) 500 mcg/2 ml nebulizer suspension, 500 mcg, Nebulization, QPM, Janie Mahajan MD, 500 mcg at 07/04/20 1706 Thank you for letting us see him with you, TIERRA Garcia [address], [address] [place] [address] Office [phone] Fax [phone]

## 2020-07-05 NOTE — PROGRESS NOTES
[address] [facility]  Hospitalist Group Hospitalist Progress Note Keith Bañuelos MD 
Answering service: [phone] from in house phone Date of Service:  2020 NAME:  Kimi Gamble :  1950 MRN:  [id number] Admission Summary:  
Kimi Gamble is a 71 y.o. male past medical history significant for nonischemic cardiomyopathy, congestive heart failure with LVAD presented emergency room complaining of blood in the urine. Patient states that for the last 3 weeks ever noticed some blood in his urine but has worsened in the last few days. He is currently on Coumadin and reports compliant with medication. He had blood test ordered by the heart failure clinic and he was found to have a hemoglobin of 6. Interval history / Subjective:  
Patient without complaints Urology saw patient yesterday- recs prior to DC = CT with IV pyelogram 
 Hb stable today CBI was restarted overnight due to some recurrence of hematuria Assessment & Plan: 1. Acute on chronic anemia: due to acute hematuria 
- transfused 2 units of PRBC on 20  
- Monitor Hb- 8.0 today and appears stable 
  
2. Hematuria: in the setting of anticoagulation however INR was only 1.2. 
- Urology consulted- recs for CT with IVP(ordered for the am tomorrow)   
- CBI- will try stopping today, holding anticoagulation 
  
3. Chronic UTI: on chronic cipro as per ID recommendations. Now with hematuria but no other sign of infection 
- urine culture was negative - will continue current therapy 
  
4. ICM - Stage D,  LVEF 15%, NYHA Class IV improved to NYHA Class III s/p HM3 LVAD  
- Heart failure consult to assist with LVAD Heart failure team adjusting meds 
 
 5. PAF 
-back on digoxin 
 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 Subtherapeutic INR but now on hold due to hematuria 
  
6.  BPH 
 - continue finasteride and tamsulosin 
  
7. JOSE: holding off on cpap and resumed nocturnal oxygen Stable SO2 levels overnight Code status: FULL 
DVT prophylaxis: SCDs Care Plan discussed with: Patient/Family Anticipated Disposition: Home w/Family Anticipated Discharge: Greater than 48 hours Hospital Problems  Date Reviewed: 3/23/2020 Codes Class Noted POA Anemia ICD-10-CM: D64.9 ICD-9-CM: 285.9  7/2/2020 Unknown Hematuria ICD-10-CM: R31.9 ICD-9-CM: 599.70  7/2/2020 Unknown Review of Systems: A comprehensive review of systems was negative except for that written in the HPI. Vital Signs:  
 Last 24hrs VS reviewed since prior progress note. Most recent are: 
Visit Vitals Pulse 87 Temp 98.3 °F (36.8 °C) Resp 20 Wt 82.8 kg (182 lb 8.7 oz) SpO2 97% BMI 23.44 kg/m² Intake/Output Summary (Last 24 hours) at 7/5/2020 1647 Last data filed at 7/5/2020 1545 Gross per 24 hour Intake 68844 ml Output 15251 ml Net -3100 ml Physical Examination:  
 
 
     
Constitutional:  No acute distress, cooperative, pleasant ENT:  Oral mucosa moist, . Resp:  CTA bilaterally. No wheezing/rhonchi/rales. No accessory muscle use CV:  audible LVAD pump GI:  Soft, non distended, non tender. Musculoskeletal:  No edema, warm, 2+ pulses throughout Neurologic:  Moves all extremities. AAOx3, CN II-XII reviewed Psych:  Good insight, Not anxious nor agitated. Data Review:  
 Review and/or order of clinical lab test 
Review and/or order of tests in the radiology section of CPT Review and/or order of tests in the medicine section of CPT Labs:  
 
Recent Labs  
  07/05/20 
0309 07/04/20 
1443 07/04/20 
0454 WBC 3.6*  --  4.0* HGB 8.0* 7.9* 7.9*  
HCT 26.2* 25.3* 25.4*  
  --  158 Recent Labs  
  07/05/20 
0309 07/04/20 
0454 07/03/20 
1038  136 138  
K 4.2 4.2 4.4  
 109* 110* CO2 23 21 22 BUN 18 17 19 CREA 1.19 1.26 1.17  
GLU 93 113* 95  
CA 7.5* 7.9* 7.9*  
MG 2.0 1.9  --   
 
Recent Labs  
  07/05/20 
0309 07/04/20 
0454 07/02/20 
1806 ALT 8* 10* 10*  113 135* TBILI 0.4 0.6 0.4 TP 5.6* 6.3* 6.7 ALB 2.4* 2.5* 2.8*  
GLOB 3.2 3.8 3.9 Recent Labs  
  07/05/20 
0309 07/04/20 
0454 07/03/20 
1038 07/02/20 
1806 INR 1.2* 1.3* 1.3* 1.2* PTP 12.4* 13.2* 13.2* 12.7* APTT  --   --   --  27.9 No results for input(s): FE, TIBC, PSAT, FERR in the last 72 hours. Lab Results Component Value Date/Time Folate 16.5 01/16/2020 04:57 AM  
  
No results for input(s): PH, PCO2, PO2 in the last 72 hours. No results for input(s): CPK, CKNDX, TROIQ in the last 72 hours. No lab exists for component: CPKMB Lab Results Component Value Date/Time Cholesterol, total 90 10/26/2019 04:09 AM  
 HDL Cholesterol 21 10/26/2019 04:09 AM  
 LDL, calculated 56.2 10/26/2019 04:09 AM  
 Triglyceride 64 10/26/2019 04:09 AM  
 CHOL/HDL Ratio 4.3 10/26/2019 04:09 AM  
 
Lab Results Component Value Date/Time Glucose (POC) 99 01/27/2020 11:11 AM  
 Glucose (POC) 137 (H) 01/07/2020 11:16 AM  
 Glucose (POC) 115 (H) 01/07/2020 06:52 AM  
 Glucose (POC) 106 (H) 01/06/2020 09:17 PM  
 Glucose (POC) 122 (H) 01/06/2020 04:17 PM  
 
Lab Results Component Value Date/Time  Color YELLOW/STRAW 07/02/2020 07:22 PM  
 Appearance CLEAR 07/02/2020 07:22 PM  
 Specific gravity 1.018 07/02/2020 07:22 PM  
 Specific gravity 1.015 10/31/2019 11:00 AM  
 pH (UA) 7.0 07/02/2020 07:22 PM  
 Protein 300 (A) 07/02/2020 07:22 PM  
 Glucose Negative 07/02/2020 07:22 PM  
 Ketone Negative 07/02/2020 07:22 PM  
 Bilirubin Negative 07/02/2020 07:22 PM  
 Urobilinogen 0.2 07/02/2020 07:22 PM  
 Nitrites Negative 07/02/2020 07:22 PM  
 Leukocyte Esterase Negative 07/02/2020 07:22 PM  
 Epithelial cells FEW 07/02/2020 07:22 PM  
 Bacteria Negative 07/02/2020 07:22 PM  
 WBC 10-20 07/02/2020 07:22 PM  
 RBC >100 (H) 07/02/2020 07:22 PM  
 
 
 
Medications Reviewed:  
 
Current Facility-Administered Medications Medication Dose Route Frequency  fluticasone propionate (FLONASE) 50 mcg/actuation nasal spray 2 Spray  2 Spray Both Nostrils DAILY  ondansetron (ZOFRAN) injection 4 mg  4 mg IntraVENous Q4H PRN  
 albuterol (PROVENTIL VENTOLIN) nebulizer solution 2.5 mg  2.5 mg Nebulization Q4H PRN  
 docusate sodium (COLACE) capsule 100 mg  100 mg Oral BID  hydrALAZINE (APRESOLINE) 20 mg/mL injection 10 mg  10 mg IntraVENous Q4H PRN  
 hydrALAZINE (APRESOLINE) 20 mg/mL injection 20 mg  20 mg IntraVENous Q4H PRN  
 0.9% sodium chloride infusion 250 mL  250 mL IntraVENous PRN  
 0.9% sodium chloride infusion 250 mL  250 mL IntraVENous PRN  
 sodium chloride (NS) flush 5-40 mL  5-40 mL IntraVENous Q8H  
 sodium chloride (NS) flush 5-40 mL  5-40 mL IntraVENous PRN  
 tamsulosin (FLOMAX) capsule 0.8 mg  0.8 mg Oral QHS  venlafaxine-SR (EFFEXOR-XR) capsule 150 mg  150 mg Oral DAILY WITH BREAKFAST  levETIRAcetam (KEPPRA) tablet 250 mg  250 mg Oral BID  finasteride (PROSCAR) tablet 5 mg  5 mg Oral DAILY  digoxin (LANOXIN) tablet 0.125 mg  0.125 mg Oral EVERY OTHER DAY  ciprofloxacin HCl (CIPRO) tablet 750 mg  750 mg Oral Q12H  
 budesonide (PULMICORT) 500 mcg/2 ml nebulizer suspension  500 mcg Nebulization QPM  
 
______________________________________________________________________ EXPECTED LENGTH OF STAY: - - - 
ACTUAL LENGTH OF STAY:          3 Silvestre Pfeiffer MD

## 2020-07-05 NOTE — PROGRESS NOTES
Physical Therapy Orders received and chart reviewed. Met with patient who states getting up out of bed without difficultly and had been walking to the bathroom with supervision of staff. He is here for hematuria with history of LVAD. Receiving home health 2x/week at home. Feel he is likely baseline for mobility and safe to mobilize with nursing and resume home health at time of discharge. Will complete orders at this time. Please re-consult if indicated.  
Thank you, 
Neris Hoover, PT

## 2020-07-06 NOTE — TELEPHONE ENCOUNTER
Telephone Call RE:  Lab Reminder      Outcome:     [] Patient verbalizes understanding    [] Unable to reach   [x] Left message              []       Nadia Berry

## 2020-07-06 NOTE — PROGRESS NOTES
1930: Bedside shift change report received by Monie Loja (offgoing nurse). Report included the following information SBAR, Kardex, Procedure Summary, Intake/Output, MAR, Recent Results, and Cardiac Rhythm Paced/SR . 2300: CBI resumed for hematuria per instructions given to Monie Loja RN by Dr. Leonel Tomlin. 0730: Bedside shift change report given to Staci Weston (oncoming nurse). Report included the following information SBAR, Kardex, Procedure Summary, Intake/Output, MAR, Recent Results and Cardiac Rhythm Paced.  
 
----------------- Care Plan 2019 Problem: Falls - Risk of 
Goal: *Absence of Falls Description: Document Loretta Sky Fall Risk and appropriate interventions in the flowsheet. Outcome: Progressing Towards Goal 
Note: Fall Risk Interventions: 
  
 
  
 
Medication Interventions: Evaluate medications/consider consulting pharmacy, Patient to call before getting OOB, Teach patient to arise slowly Elimination Interventions: Call light in reach, Patient to call for help with toileting needs, Stay With Me (per policy), Toileting schedule/hourly rounds Problem: Pressure Injury - Risk of 
Goal: *Prevention of pressure injury Description: Document Mich Scale and appropriate interventions in the flowsheet. Outcome: Progressing Towards Goal 
Note: Pressure Injury Interventions: Activity Interventions: Increase time out of bed, Pressure redistribution bed/mattress(bed type), PT/OT evaluation Mobility Interventions: HOB 30 degrees or less, Pressure redistribution bed/mattress (bed type), PT/OT evaluation, Turn and reposition approx. every two hours(pillow and wedges) Nutrition Interventions: Document food/fluid/supplement intake

## 2020-07-06 NOTE — PROGRESS NOTES
[facility] VAD/Heart Failure Progress Note Admit Date: 2020 POD:  * No surgery found * Procedure:      
 
 Subjective:  
Mild fatigue and weakness; hematuria improved; CT scan today Objective:  
Vitals: 
Pulse 78, temperature 98.2 °F (36.8 °C), resp. rate 18, weight 179 lb 14.3 oz (81.6 kg), SpO2 98 %. Temp (24hrs), Av.3 °F (36.8 °C), Min:98.2 °F (36.8 °C), Max:98.7 °F (37.1 °C) Hemodynamics: 
 CO:   
 CI:   
 CVP:   
 SVR:   
 PAP Systolic:   
 PAP Diastolic:   
 PVR:   
 MJ77:   
 SCV02:   
 
VAD Interrogation: LVAD (Heartmate) Pump Speed (RPM): 5800 Pump Flow (LPM): 5 Chatter in Lines: No 
PI (Pulsitility Index): 3.8 Power: 4.5 MAP: 88  Test: No 
Back Up  at Bedside & Labeled: Yes Power Module Test: No 
Driveline Site Care: No 
Driveline Dressing: Clean, Dry, and Intact EKG/Rhythm:   
 
Extubation Date / Time:  
 
CT Output:  
 
Ventilator: 
  
 
Oxygen Therapy: 
Oxygen Therapy O2 Sat (%): 98 % (20 1215) Pulse via Oximetry: 81 beats per minute (20 1930) O2 Device: Room air (20 0800) O2 Flow Rate (L/min): 2 l/min (20 0348) CXR: 
 
Admission Weight: Last Weight Weight: 185 lb 10 oz (84.2 kg) Weight: 179 lb 14.3 oz (81.6 kg) Intake / Output / Drain: 
Current Shift: 701 -  1900 In: 3095 [P.O.:240] Out: 2385 [Urine:2385] Last 24 hrs.:  
 
Intake/Output Summary (Last 24 hours) at 2020 1236 Last data filed at 2020 3986 Gross per 24 hour Intake 6480 ml Output 30613 ml Net -7005 ml No results for input(s): CPK, CKMB, TROIQ in the last 72 hours. Recent Labs  
  20 
0238 20 
0309 20 
1443 20 
0454  136  --  136  
K 4.3 4.2  --  4.2 CO2 22 23  --  21 BUN 18 18  --  17  
CREA 1.24 1.19  --  1.26  
GLU 94 93  --  113* MG 2.0 2.0  --  1.9 WBC 4.2 3.6*  --  4.0* HGB 8.3* 8.0* 7.9* 7.9*  
HCT 26.5* 26.2* 25.3* 25.4*  
  154  --  158 Recent Labs  
  07/06/20 
0238 07/05/20 
0309 07/04/20 
0454 INR 1.2* 1.2* 1.3* PTP 12.0* 12.4* 13.2* No lab exists for component: PBNP Current Facility-Administered Medications:  
  fluticasone propionate (FLONASE) 50 mcg/actuation nasal spray 2 Spray, 2 Spray, Both Nostrils, DAILY, Levi, Shana B, NP, 2 Spray at 07/06/20 1142   ondansetron (ZOFRAN) injection 4 mg, 4 mg, IntraVENous, Q4H PRN, Levi, Shana B, NP 
  albuterol (PROVENTIL VENTOLIN) nebulizer solution 2.5 mg, 2.5 mg, Nebulization, Q4H PRN, Levi, Shana B, NP 
  docusate sodium (COLACE) capsule 100 mg, 100 mg, Oral, BID, Levi, Shana B, NP, 100 mg at 07/06/20 1142   hydrALAZINE (APRESOLINE) 20 mg/mL injection 10 mg, 10 mg, IntraVENous, Q4H PRN, Levi, Shana B, NP 
  hydrALAZINE (APRESOLINE) 20 mg/mL injection 20 mg, 20 mg, IntraVENous, Q4H PRN, Levi, Shana B, NP 
  0.9% sodium chloride infusion 250 mL, 250 mL, IntraVENous, PRN, Levi, Shana B, NP 
  0.9% sodium chloride infusion 250 mL, 250 mL, IntraVENous, PRN, Daly Gonzalez PA-C 
  sodium chloride (NS) flush 5-40 mL, 5-40 mL, IntraVENous, Q8H, Monika Lopes MD, 10 mL at 07/06/20 0954   sodium chloride (NS) flush 5-40 mL, 5-40 mL, IntraVENous, PRN, Monika Lopes MD, 10 mL at 07/03/20 1035   tamsulosin (FLOMAX) capsule 0.8 mg, 0.8 mg, Oral, QHS, Monika Lopes MD, 0.8 mg at 07/05/20 2235 
  venlafaxine-SR (EFFEXOR-XR) capsule 150 mg, 150 mg, Oral, DAILY WITH BREAKFAST, Monika Lopes MD, 150 mg at 07/06/20 1141   levETIRAcetam (KEPPRA) tablet 250 mg, 250 mg, Oral, BID, Monika Lopes MD, 250 mg at 07/06/20 1141 
  finasteride (PROSCAR) tablet 5 mg, 5 mg, Oral, DAILY, Monika Lopes MD, 5 mg at 07/05/20 2235   digoxin (LANOXIN) tablet 0.125 mg, 0.125 mg, Oral, EVERY OTHER DAY, Monika Lopes MD, 0.125 mg at 07/06/20 1141   ciprofloxacin HCl (CIPRO) tablet 750 mg, 750 mg, Oral, Q12H, Bentley Ramirez, Mirna Anderson MD, 750 mg at 07/06/20 1142   budesonide (PULMICORT) 500 mcg/2 ml nebulizer suspension, 500 mcg, Nebulization, QPM, Adi Khan MD, 500 mcg at 07/05/20 1930 A/P Hematuria - urology following  
S/P LVAD - good flows Need for anticoagulation - heparin / coumadin when appropriate  
HTN - home meds  
  
Risk of morbidity and mortality - high Medical decision making - high complexity 
  
  
 
Signed By: Laura Segovia MD 
 
 
 contact guard

## 2020-07-06 NOTE — PROGRESS NOTES
[address] [facility] Inpatient Progress Note Patient name: Prosper Breen Patient : 1950 Patient MRN: [id number] Date of service: 20 CHIEF COMPLAINT: 
Chief Complaint Patient presents with  Blood in Urine HPI: Meryl Kiser is a 71y.o. year old white male with a history of HTN, HLD, JOSE, CAD s/p cardiac arrest VFib s/p CABG () c/b sternal would infection and sternectomy, ischemic cardiomyopathy LVEF 15-20%, s/p BiVICD  who underwent LVAD as DT on 2019 after bridging with Impella 5.0.  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 He was transferred to [facility] rehab on  and discharged from rehab on 2020. Darian Garsia was seen  for an LVAD follow up visit. Due to fever, chills, and hypotension, he was referred to the ED, evaluated and admitted to [facility]. He underwent treatment for recurrent pseudomonas bacteremia due to UTI. His Cipro was resumed and he was discharged home in stable condition. Mr. Milo Mccabe presented to ED after routine labs showed a hgb drop from 10 to 6.8, he states he has had hematuria on and off for 2 weeks. He did note that he was more fatigued when working with PT at home. He denies other acute complaints. 24Hr Events Hematuria overnight, resumed CBI No acute overnight events Off AC Assessment/Plan: NYHA Class II Acute blood loss anemia due to hematuria Hgb stable post 2 unit PRBC transfusion Transfuse to keep hgb> 7 Urology following Continuous bladder irrigation per urology Ok w/ CT w/ IV pyelogram  
 CT of ABD and pelvis (20) - Findings consistent with acute cystitis Hold AC for now- will need to resume soon Recommend cystocopy prior to discharge ICM - Stage D,  LVEF 15%, NYHA Class IV improved to NYHA Class II s/p HM3 LVAD as DT on 11/18/19 with Impella 5.0 as bridge to LVAD Continue LVAD speed at 5800 rpm  
 TTE 4/20 shows large LVIDd, 6.84 cm, RVIDd 3.06 cm, TAPSE 1.15 cm, severely elevated CVP Repeat TTE this am- results pending Continue digoxin 0.125 mg PO daily to support RV, trend daily levels Intolerant to BB d/t RV failure Intolerant to [facility] due to persistent orthostasis despite increased fluid intake Intolerant to spironolactone due to IVVD and hyponatremia Monitor CardioMEMs readings HTN, goal MAP 70-90 mmHg MAPs on LUE only d/t previous right axillary Impella cannulation H/o pseudomonal infection [facility] 4/20 - 2/4 bottles growing pseudomonas UA negative for bacteria Continue cipro indefinitely Chronic anticoagulation, goal INR 1.5-2.0 Hold ASA and coumadin due to hematuria for now Will need to resume at some point Monitor MSSA drive line infection Repeat culture from 5/7/20- MSSA Gentamicin to exit site with daily dressing changes CT x 3 reviewed by Dr. Calreen Galloway - small fluid collection adjacent to previous real drain tract has resolved Continue daily dressing changes Trend CTs Tremors Resolved Continue keppra 250 mg BID 
  weaned off clonazepam  
 
BPH On finasteride and tamsulosin PSA 0.4 on 7/5 Depression On Effexor -  mg daily Persistently elevated CEA Persistently elevated CEA; PET scan shows increased activity RML of lung Oncology consult appreciated - PET not suggestive of malignancy Orthostatic Hypotension Persistent Off  [facility] Encourage use of CAROL hose PAF Digoxin 0.125 mg PO daily Intolerant of BB d/t RV failure On warfarin- on hold due to hematuria Debility Improving PT eval  
 
All other care per primary. CARDIAC IMAGING: 
Chest CTA- no outflow graft occlusion Pet Scan equivocal for amyloid 04/20/20 ECHO ADULT COMPLETE 04/21/2020 4/21/2020 Narrative · Image quality for this study was technically difficult. · Normal wall thickness. Severely dilated left ventricle. Severe systolic  
function. Estimated left ventricular ejection fraction is <15%. LVAD  
present. 5800 rpm 
· Dilated left atrium. · RV Not well visualized. Mildly reduced systolic function. · Mild aortic valve regurgitation is present. Aortic valve does not open  
in systole. · Mild mitral valve regurgitation is present. · Mild to moderate tricuspid valve regurgitation is present. · Severely elevated central venous pressure (15+ mmHg); IVC diameter is  
larger than 21 mm and collapses less than 50% with respiration. Signed by: Radha Hung MD  
 
 
 
10/25/19 ECHO ADULT COMPLETE 01/29/2020 1/29/2020 Narrative · Severely dilated left ventricle. Upper normal wall thickness. Severe  
systolic dysfunction. Estimated left ventricular ejection fraction is  
<15%. Left ventricular diastolic dysfunction. · Mitral valve thickening. Minimal mitral valve prolapse of the anterior  
mitral valve leaflet. Trace mitral valve regurgitation is present. · Mild tricuspid valve regurgitation is present. · Mildly dilated right ventricle. Moderately reduced RV systolic function (TAPSE 1.3 cm, RV S' 6 cm/s). Pacer/ICD present. · Mildly biatrial enlargement · Severely elevated central venous pressure (15+ mmHg); IVC diameter is  
larger than 21 mm and collapses less than 50% with respiration. · LVAD inflow cannula spectral Doppler tracings not obtained. Signed by: Laury Hamilton MD  
 
  
  
OTHER IMAGING: 
Head CT negative for acute process EEG suggestive of mild generalized encephalopathic process, possibly related to underlying structural brain injury vs metabolic abnormality Review of Systems Constitutional: Negative for chills, fever and malaise/fatigue. Respiratory: Negative for cough, sputum production and shortness of breath. Cardiovascular: Negative for chest pain, palpitations, leg swelling and PND. Gastrointestinal: Negative for blood in stool, heartburn, nausea and vomiting. Genitourinary: Positive for hematuria. +CBI, urine clear this AM  
Musculoskeletal: Negative. Neurological: Negative for dizziness, weakness and headaches. Endo/Heme/Allergies: Does not bruise/bleed easily. Psychiatric/Behavioral: Negative. Visit Vitals Pulse 78 Temp 98.2 °F (36.8 °C) Resp 18 Ht 6' 2\" (1.88 m) Wt 179 lb (81.2 kg) SpO2 98% BMI 22.98 kg/m² LVAD Pump Speed (RPM): 5800 Pump Flow (LPM): 5 MAP: 98 
PI (Pulsitility Index): 3.8 Power: 4.5  Test: No 
Back Up  at Bedside & Labeled: Yes Power Module Test: No 
Driveline Site Care: No 
Driveline Dressing: Clean, Dry, and Intact Outpatient: No 
MAP in Therapeutic Range (Outpatient): Yes Testing Alarms Reviewed: Yes 
Back up SC speed: 5800 Back up Low Speed Limit: 5400 Emergency Equipment with Patient?: Yes Emergency procedures reviewed?: No 
Drive line site inspected?: (site covered by dressing) Drive line intergrity inspected?: Yes Drive line dressing changed?: No 
  
 
Physical Exam  
Constitutional: He is oriented to person, place, and time. Vital signs are normal. He appears well-developed and well-nourished. No distress. Eyes: Pupils are equal, round, and reactive to light. Neck: Normal range of motion. No JVD present. Cardiovascular: Normal rate, regular rhythm and normal heart sounds. + VAD hum, AICD GARCÍA chest  
Pulmonary/Chest: Effort normal and breath sounds normal. No respiratory distress. Abdominal: Soft. Bowel sounds are normal. He exhibits no distension. Musculoskeletal: Normal range of motion. General: No edema. Comments: +1BLE- improved Neurological: He is alert and oriented to person, place, and time. Skin: Skin is warm and dry. Drive line site- + erythema, edema, mod purulent drainage Psychiatric: He has a normal mood and affect. Nursing note and vitals reviewed. Recent Results (from the past 12 hour(s)) PROCALCITONIN Collection Time: 07/06/20  7:22 AM  
Result Value Ref Range Procalcitonin 0.18 ng/mL LACTIC ACID Collection Time: 07/06/20  7:22 AM  
Result Value Ref Range Lactic acid 0.7 0.4 - 2.0 MMOL/L  
ECHO ADULT COMPLETE Collection Time: 07/06/20  1:44 PM  
Result Value Ref Range IVSd 0.91 0.6 - 1.0 cm LVIDd 6.91 (A) 4.2 - 5.9 cm LVIDs 6.31 cm  
 LVPWd 1.01 (A) 0.6 - 1.0 cm RVIDd 5.16 cm Left Atrium Major Axis 5.48 cm  
 MV A Isaac 41.67 cm/s Mitral Valve E Wave Deceleration Time 0.12 s MV E Isaac 44.08 cm/s Mitral Valve Pressure Half-time 0.04 s MVA (PHT) 6.22 cm2 Tapse 1.39 (A) 1.5 - 2.0 cm Triscuspid Valve Regurgitation Peak Gradient 16.27 mmHg  
 TR Max Velocity 201.70 cm/s PAST MEDICAL HISTORY: 
Past Medical History:  
Diagnosis Date  Degenerative disc disease, lumbar  Heart failure ([address]  High cholesterol  Hypertension  Paroxysmal atrial fibrillation ([address] 4/2/2019  Spinal stenosis PAST SURGICAL HISTORY: 
Past Surgical History:  
Procedure Laterality Date  COLONOSCOPY Left 11/11/2019 COLONOSCOPY at bedside performed by Fredy Jara MD at [address] [facility] [address]  HX CORONARY ARTERY BYPASS GRAFT    
 triple  HX HEART ASSIST DEVICE Left 10/29/2019 Impella 5.0  
 HX HEART ASSIST DEVICE Left 11/18/2019 HeartMate 3  
 HX HERNIA REPAIR    
 HX IMPLANTABLE CARDIOVERTER DEFIBRILLATOR  HX THROMBECTOMY Left 11/25/2019 Left brachial thrombectomy  SC CARDIOVERSION ELECTIVE ARRHYTHMIA EXTERNAL N/A 6/10/2019 EP CARDIOVERSION performed by Miladis Kaye MD at [address] CATH LAB  SC CARDIOVERSION ELECTIVE ARRHYTHMIA EXTERNAL N/A 6/18/2019 EP CARDIOVERSION performed by Judith Chavez MD at [address] [facility] [address] CATH LAB  SC INSJ/RPLCMT PERM DFB W/TRNSVNS LDS 1/DUAL CHMBR N/A 2019 INSERT ICD BIV MULTI performed by Dang Merida MD at [address] [facility]  CATH LAB  SC TCAT IMPL WRLS P-ART PRS SNR L-T HEMODYN MNTR N/A 2019 IMPLANT HEART FAILURE MONITORING DEVICE performed by Mae Miner MD at [address] [facility]  CATH LAB FAMILY HISTORY: 
Family History Problem Relation Age of Onset  Lung Disease Mother  Hypertension Mother [facility] Arthritis-osteo Mother  Heart Disease Mother  Heart Disease Father  Diabetes Father SOCIAL HISTORY: 
Social History Socioeconomic History  Marital status:  Spouse name: Not on file  Number of children: Not on file  Years of education: Not on file  Highest education level: Not on file Tobacco Use  Smoking status: Former Smoker Last attempt to quit: 2010 Years since quittin.6  Smokeless tobacco: Never Used Substance and Sexual Activity  Alcohol use: Not Currently Comment: rarely  Drug use: Never Other Topics Concern ALLERGY: 
Allergies Allergen Reactions  Cefepime Other (comments)  
  myoclonus CURRENT MEDICATIONS: 
 
Current Facility-Administered Medications:  
  budesonide (PULMICORT) 500 mcg/2 ml nebulizer suspension, 500 mcg, Nebulization, QPM, Warden Ana MD 
  fluticasone propionate (FLONASE) 50 mcg/actuation nasal spray 2 Spray, 2 Spray, Both Nostrils, DAILY, Levi, Shana B, NP, 2 Spray at 20 1142   ondansetron (ZOFRAN) injection 4 mg, 4 mg, IntraVENous, Q4H PRN, Levi, Shana B, NP 
  albuterol (PROVENTIL VENTOLIN) nebulizer solution 2.5 mg, 2.5 mg, Nebulization, Q4H PRN, Levi, Shana B, NP 
  docusate sodium (COLACE) capsule 100 mg, 100 mg, Oral, BID, Levi, Shana B, NP, 100 mg at 20 1142   hydrALAZINE (APRESOLINE) 20 mg/mL injection 10 mg, 10 mg, IntraVENous, Q4H PRN, Levi, Shana B, NP 
  hydrALAZINE (APRESOLINE) 20 mg/mL injection 20 mg, 20 mg, IntraVENous, Q4H PRN, Levi, Shana B, NP 
  0.9% sodium chloride infusion 250 mL, 250 mL, IntraVENous, PRN, Levi, Shana B, NP 
  0.9% sodium chloride infusion 250 mL, 250 mL, IntraVENous, PRN, Kath Reese PA-C 
  sodium chloride (NS) flush 5-40 mL, 5-40 mL, IntraVENous, Q8H, Jo Mas MD, 10 mL at 07/06/20 6289   sodium chloride (NS) flush 5-40 mL, 5-40 mL, IntraVENous, PRN, Jo Mas MD, 10 mL at 07/03/20 1035   tamsulosin (FLOMAX) capsule 0.8 mg, 0.8 mg, Oral, QHS, Jo Mas MD, 0.8 mg at 07/05/20 2235 
  venlafaxine-SR (EFFEXOR-XR) capsule 150 mg, 150 mg, Oral, DAILY WITH BREAKFAST, Jo Mas MD, 150 mg at 07/06/20 1141   levETIRAcetam (KEPPRA) tablet 250 mg, 250 mg, Oral, BID, Jo Mas MD, 250 mg at 07/06/20 1141 
  finasteride (PROSCAR) tablet 5 mg, 5 mg, Oral, DAILY, Jo Mas MD, 5 mg at 07/05/20 2235   digoxin (LANOXIN) tablet 0.125 mg, 0.125 mg, Oral, EVERY OTHER DAY, Jo Mas MD, 0.125 mg at 07/06/20 1141   ciprofloxacin HCl (CIPRO) tablet 750 mg, 750 mg, Oral, Q12H, Jo Mas MD, 750 mg at 07/06/20 1142 Thank you for letting us see him with you, Leroy Bailer, NP [facility] [address], [address] [place] [address] Office [phone] Fax [phone] [facility] ATTENDING ADDENDUM Patient was seen and examined in person. Data and notes were reviewed. I have discussed and agree with the plan as noted in the NP note above without further additions. Tamara Betancur MD PhD 
[facility] [address] [facility]

## 2020-07-06 NOTE — PROGRESS NOTES
0730: Bedside shift change report given to Jesse Trivedi RN, (oncoming nurse) by Mendel Patiño, KATRIN, (offgoing nurse). Report included the following information SBAR, Kardex, Intake/Output, MAR, Accordion, Recent Results and Cardiac Rhythm paced. 1030: Traveling off unit with patient on monitor and LVAD backup equipment to CT for abdominal CT.  
 
1530: Contacted urology office for information on on call provider and plan of care. Told by office that Nataly Childress MD,'s RN would contact unit for further follow up. Awaiting contact. 1615: Follow up with urology regarding getting contact for on call provider for physician to physician communication. Told that MD's RN would be the only one that could provide further correct information and no number for physician was available at this time. Awaiting urology RN to contact unit. 1630: Spoke with Ga Nava, with urology regarding heart failure's concerns (cystoscopy IP, anticoagulation) and RN stated she would follow up with MD and determine who will be taking over MD's patient's while on vacation. 1800: Spoke with Kamala Mathews RN, again. Urology NP will round tomorrow and speak with heart failure then. Informed Chasity Mary NP. 
 
2746: Bedside shift change report given to Susan Hughes RN, (oncoming nurse) by Jesse Trivedi RN, (offgoing nurse). Report included the following information SBAR, Kardex, Procedure Summary, Intake/Output, MAR, Accordion, Recent Results and Cardiac Rhythm paced. Problem: Falls - Risk of 
Goal: *Absence of Falls Description: Document Dianne Sierra Fall Risk and appropriate interventions in the flowsheet. Outcome: Progressing Towards Goal 
Note: Fall Risk Interventions: 
  
 
  
 
Medication Interventions: Patient to call before getting OOB, Teach patient to arise slowly Elimination Interventions: Call light in reach, Patient to call for help with toileting needs, Stay With Me (per policy), Toileting schedule/hourly rounds Problem: Patient Education: Go to Patient Education Activity Goal: Patient/Family Education Outcome: Progressing Towards Goal 
  
Problem: Pressure Injury - Risk of 
Goal: *Prevention of pressure injury Description: Document Mich Scale and appropriate interventions in the flowsheet. Outcome: Progressing Towards Goal 
Note: Pressure Injury Interventions: Activity Interventions: Increase time out of bed Mobility Interventions: HOB 30 degrees or less, Float heels Nutrition Interventions: Document food/fluid/supplement intake Problem: Patient Education: Go to Patient Education Activity Goal: Patient/Family Education Outcome: Progressing Towards Goal

## 2020-07-07 NOTE — PROGRESS NOTES
[address] [facility] Adult  Hospitalist Group Hospitalist Progress Note Adina Satrk MD 
Answering service: [phone] from in house phone NAME:  Selina Barrientos :  1950 MRN:  [id number] Admission Summary:  
Selina Barrientos is a 71 y.o. male past medical history significant for nonischemic cardiomyopathy, congestive heart failure with LVAD presented emergency room complaining of blood in the urine. Patient states that for the last 3 weeks ever noticed some blood in his urine but has worsened in the last few days. He is currently on Coumadin and reports compliant with medication. He had blood test ordered by the heart failure clinic and he was found to have a hemoglobin of 6. Interval history / Subjective:  
Patient without complaints CT with IV pyelogram done today Hb stable today CBI was restarted overnight due to some recurrence of hematuria Assessment & Plan: 1. Acute on chronic anemia: due to acute hematuria 
- transfused 2 units of PRBC on 20  
- Monitor Hb- 8.0 today and appears stable 
  
2. Hematuria: in the setting of anticoagulation however INR was only 1.2. 
- Urology consulted-  CT with IVP done today showing cystitis and no kidney issues  
- CBI- will try stopping today, holding anticoagulation 
  
3. Chronic UTI: on chronic cipro as per ID recommendations. Now with hematuria but no other sign of infection 
- urine culture was negative - will continue current therapy 
  
4. ICM - Stage D,  LVEF 15%, NYHA Class IV improved to NYHA Class III s/p HM3 LVAD  
- Heart failure consult to assist with LVAD Heart failure team adjusting meds 
 
 5. PAF 
-back on digoxin 
 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 Subtherapeutic INR but now on hold due to hematuria 
  
6.  BPH 
- continue finasteride and tamsulosin 
  
 7. JOSE: holding off on cpap and resumed nocturnal oxygen Stable SO2 levels overnight Code status: FULL 
DVT prophylaxis: SCDs Care Plan discussed with: Patient/Family Anticipated Disposition: Home w/Family Anticipated Discharge: Greater than 48 hours Hospital Problems  Date Reviewed: 3/23/2020 Codes Class Noted POA Anemia ICD-10-CM: D64.9 ICD-9-CM: 285.9  7/2/2020 Unknown Hematuria ICD-10-CM: R31.9 ICD-9-CM: 599.70  7/2/2020 Unknown Review of Systems: A comprehensive review of systems was negative except for that written in the HPI. Vital Signs:  
 Last 24hrs VS reviewed since prior progress note. Most recent are: 
Visit Vitals Pulse 82 Temp 98.2 °F (36.8 °C) Resp 18 Ht 6' 2\" (1.88 m) Wt 80.8 kg (178 lb 2.1 oz) SpO2 96% BMI 22.87 kg/m² Intake/Output Summary (Last 24 hours) at 7/7/2020 4881 Last data filed at 7/7/2020 0301 Gross per 24 hour Intake 73793 ml Output 9010 ml Net 3650 ml Physical Examination:  
 
 
     
Constitutional:  No acute distress, cooperative, pleasant ENT:  Oral mucosa moist, . Resp:  CTA bilaterally. No wheezing/rhonchi/rales. No accessory muscle use CV:  audible LVAD pump GI:  Soft, non distended, non tender. Musculoskeletal:  No edema, warm, 2+ pulses throughout Neurologic:  Moves all extremities. AAOx3, CN II-XII reviewed Psych:  Good insight, Not anxious nor agitated. Data Review:  
 Review and/or order of clinical lab test 
Review and/or order of tests in the radiology section of CPT Review and/or order of tests in the medicine section of CPT Labs:  
 
Recent Labs  
  07/07/20 0300 07/06/20 
0238 WBC 4.3 4.2 HGB 8.4* 8.3* HCT 27.2* 26.5*  
 161 Recent Labs  
  07/07/20 
0300 07/06/20 
0238 07/05/20 
0309  138 136  
K 4.2 4.3 4.2  109* 107 CO2 22 22 23 BUN 17 18 18 CREA 1.15 1.24 1.19 GLU 92 94 93 CA 8.4* 8.1* 7.5*  
 MG 2.0 2.0 2.0 Recent Labs  
  07/07/20 
0300 07/06/20 
0238 07/05/20 
0309 ALT 9* 11* 8*  112 116 TBILI 0.6 0.4 0.4 TP 6.4 6.2* 5.6* ALB 2.5* 2.5* 2.4*  
GLOB 3.9 3.7 3.2 Recent Labs  
  07/07/20 
0300 07/06/20 
0238 07/05/20 
0309 INR 1.2* 1.2* 1.2* PTP 11.9* 12.0* 12.4* No results for input(s): FE, TIBC, PSAT, FERR in the last 72 hours. Lab Results Component Value Date/Time Folate 16.5 01/16/2020 04:57 AM  
  
No results for input(s): PH, PCO2, PO2 in the last 72 hours. No results for input(s): CPK, CKNDX, TROIQ in the last 72 hours. No lab exists for component: CPKMB Lab Results Component Value Date/Time Cholesterol, total 90 10/26/2019 04:09 AM  
 HDL Cholesterol 21 10/26/2019 04:09 AM  
 LDL, calculated 56.2 10/26/2019 04:09 AM  
 Triglyceride 64 10/26/2019 04:09 AM  
 CHOL/HDL Ratio 4.3 10/26/2019 04:09 AM  
 
Lab Results Component Value Date/Time Glucose (POC) 99 01/27/2020 11:11 AM  
 Glucose (POC) 137 (H) 01/07/2020 11:16 AM  
 Glucose (POC) 115 (H) 01/07/2020 06:52 AM  
 Glucose (POC) 106 (H) 01/06/2020 09:17 PM  
 Glucose (POC) 122 (H) 01/06/2020 04:17 PM  
 
Lab Results Component Value Date/Time Color YELLOW/STRAW 07/02/2020 07:22 PM  
 Appearance CLEAR 07/02/2020 07:22 PM  
 Specific gravity 1.018 07/02/2020 07:22 PM  
 Specific gravity 1.015 10/31/2019 11:00 AM  
 pH (UA) 7.0 07/02/2020 07:22 PM  
 Protein 300 (A) 07/02/2020 07:22 PM  
 Glucose Negative 07/02/2020 07:22 PM  
 Ketone Negative 07/02/2020 07:22 PM  
 Bilirubin Negative 07/02/2020 07:22 PM  
 Urobilinogen 0.2 07/02/2020 07:22 PM  
 Nitrites Negative 07/02/2020 07:22 PM  
 Leukocyte Esterase Negative 07/02/2020 07:22 PM  
 Epithelial cells FEW 07/02/2020 07:22 PM  
 Bacteria Negative 07/02/2020 07:22 PM  
 WBC 10-20 07/02/2020 07:22 PM  
 RBC >100 (H) 07/02/2020 07:22 PM  
 
 
 
Medications Reviewed:  
 
Current Facility-Administered Medications Medication Dose Route Frequency  budesonide (PULMICORT) 500 mcg/2 ml nebulizer suspension  500 mcg Nebulization QPM  
 fluticasone propionate (FLONASE) 50 mcg/actuation nasal spray 2 Spray  2 Spray Both Nostrils DAILY  ondansetron (ZOFRAN) injection 4 mg  4 mg IntraVENous Q4H PRN  
 albuterol (PROVENTIL VENTOLIN) nebulizer solution 2.5 mg  2.5 mg Nebulization Q4H PRN  
 docusate sodium (COLACE) capsule 100 mg  100 mg Oral BID  hydrALAZINE (APRESOLINE) 20 mg/mL injection 10 mg  10 mg IntraVENous Q4H PRN  
 hydrALAZINE (APRESOLINE) 20 mg/mL injection 20 mg  20 mg IntraVENous Q4H PRN  
 0.9% sodium chloride infusion 250 mL  250 mL IntraVENous PRN  
 0.9% sodium chloride infusion 250 mL  250 mL IntraVENous PRN  
 sodium chloride (NS) flush 5-40 mL  5-40 mL IntraVENous Q8H  
 sodium chloride (NS) flush 5-40 mL  5-40 mL IntraVENous PRN  
 tamsulosin (FLOMAX) capsule 0.8 mg  0.8 mg Oral QHS  venlafaxine-SR (EFFEXOR-XR) capsule 150 mg  150 mg Oral DAILY WITH BREAKFAST  levETIRAcetam (KEPPRA) tablet 250 mg  250 mg Oral BID  finasteride (PROSCAR) tablet 5 mg  5 mg Oral DAILY  digoxin (LANOXIN) tablet 0.125 mg  0.125 mg Oral EVERY OTHER DAY  ciprofloxacin HCl (CIPRO) tablet 750 mg  750 mg Oral Q12H  
 
______________________________________________________________________ EXPECTED LENGTH OF STAY: 2d 16h ACTUAL LENGTH OF STAY:          5 Kathy Torres MD

## 2020-07-07 NOTE — PROGRESS NOTES
0730: Bedside shift change report given to Isa Martínez, (oncoming nurse) by Steven Ortiz, KATRIN, (offgoing nurse). Report included the following information SBAR, Kardex, Procedure Summary, Intake/Output, MAR, Accordion, Recent Results and Cardiac Rhythm paced. 0815: Clamped and stopped continuous bladder irrigation per urology for further assessment by MD in approximately 2hrs. 1015: Per heart failure, to wait to discontinue neal until after they talk with urology. 1500: Neal removed per orders. 1915: Patient first void after neal removal. Began 24hr rack urine collection. Patient voided only 75cc with first void, stating he feels like he is retaining some urine. Attempted to void again, voiding approximately 20cc. Bladder scan at 1925 showed 299cc. Per protocol, will inform night RN to recheck in 2hrs. 1930: Bedside shift change report given to Dorothy RN, (oncoming nurse) by Dionne Calvillo RN, (offgoing nurse). Report included the following information SBAR, Kardex, Intake/Output, MAR, Accordion, Recent Results and Cardiac Rhythm paced. Problem: Falls - Risk of 
Goal: *Absence of Falls Description: Document Devere Levelland Fall Risk and appropriate interventions in the flowsheet. 7/7/2020 1249 by Lynn Hardwick Outcome: Progressing Towards Goal 
Note: Fall Risk Interventions: 
Mobility Interventions: Communicate number of staff needed for ambulation/transfer, Patient to call before getting OOB, Utilize walker, cane, or other assistive device Medication Interventions: Patient to call before getting OOB, Teach patient to arise slowly Elimination Interventions: Call light in reach, Patient to call for help with toileting needs, Stay With Me (per policy) 7/7/2020 1147 by Lynn Hardwick Outcome: Progressing Towards Goal 
Note: Fall Risk Interventions: 
Mobility Interventions: Communicate number of staff needed for ambulation/transfer, Patient to call before getting OOB, Utilize walker, cane, or other assistive device Medication Interventions: Patient to call before getting OOB, Teach patient to arise slowly Elimination Interventions: Call light in reach, Patient to call for help with toileting needs, Stay With Me (per policy) Problem: Patient Education: Go to Patient Education Activity Goal: Patient/Family Education 7/7/2020 1249 by Nerissa Ward Outcome: Progressing Towards Goal 
7/7/2020 1147 by Nerissa Ward Outcome: Progressing Towards Goal 
  
Problem: Anemia Care Plan (Adult and Pediatric) Goal: *Labs within defined limits Outcome: Progressing Towards Goal 
Goal: *Tolerates increased activity Outcome: Progressing Towards Goal 
  
Problem: Patient Education: Go to Patient Education Activity Goal: Patient/Family Education Outcome: Progressing Towards Goal 
  
Problem: Pressure Injury - Risk of 
Goal: *Prevention of pressure injury Description: Document Mich Scale and appropriate interventions in the flowsheet. 7/7/2020 1249 by Nerissa Ward Outcome: Progressing Towards Goal 
Note: Pressure Injury Interventions: Activity Interventions: Increase time out of bed Mobility Interventions: Chair cushion, Pressure redistribution bed/mattress (bed type) Nutrition Interventions: Document food/fluid/supplement intake 7/7/2020 1147 by Nerissa Ward Outcome: Progressing Towards Goal 
Note: Pressure Injury Interventions: Activity Interventions: Increase time out of bed Mobility Interventions: Chair cushion, Pressure redistribution bed/mattress (bed type) Nutrition Interventions: Document food/fluid/supplement intake Problem: Patient Education: Go to Patient Education Activity Goal: Patient/Family Education 7/7/2020 1249 by Nerissa Ward Outcome: Progressing Towards Goal 
 7/7/2020 1147 by Susy Cook Outcome: Progressing Towards Goal 
  
Problem: Heart Failure: Day 5 Goal: Off Pathway (Use only if patient is Off Pathway) Outcome: Progressing Towards Goal 
Goal: Activity/Safety Outcome: Progressing Towards Goal 
Goal: Diagnostic Test/Procedures Outcome: Progressing Towards Goal 
Goal: Nutrition/Diet Outcome: Progressing Towards Goal 
Goal: Discharge Planning Outcome: Progressing Towards Goal 
Goal: Medications Outcome: Progressing Towards Goal 
Goal: Respiratory Outcome: Progressing Towards Goal 
Goal: Treatments/Interventions/Procedures Outcome: Progressing Towards Goal 
Goal: Psychosocial 
Outcome: Progressing Towards Goal

## 2020-07-07 NOTE — PROGRESS NOTES
4081 [facility] Inpatient Progress Note Patient name: Sancho De Luna Patient : 1950 Patient MRN: [id number] Date of service: 20 CHIEF COMPLAINT: 
Chief Complaint Patient presents with  Blood in Urine HPI: Kenyetta Kiser is a 71y.o. year old white male with a history of HTN, HLD, JOSE, CAD s/p cardiac arrest VFib s/p CABG () c/b sternal would infection and sternectomy, ischemic cardiomyopathy LVEF 15-20%, s/p BiVICD  who underwent LVAD as DT on 2019 after bridging with Impella 5.0.  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 He was transferred to [facility] rehab on  and discharged from rehab on 2020. Maday Meier was seen  for an LVAD follow up visit. Due to fever, chills, and hypotension, he was referred to the ED, evaluated and admitted to [facility]. He underwent treatment for recurrent pseudomonas bacteremia due to UTI. His Cipro was resumed and he was discharged home in stable condition. Mr. Shanon Hummel presented to ED after routine labs showed a hgb drop from 10 to 6.8, he states he has had hematuria on and off for 2 weeks. He did note that he was more fatigued when working with PT at home. He denies other acute complaints. 24Hr Events CBI yesterday No acute overnight events 
remains Off AC- INR 1.2 Assessment/Plan: NYHA Class II Acute blood loss anemia due to hematuria Hgb stable post 2 unit PRBC transfusion Transfuse to keep hgb> 7 D/C Continuous bladder irrigation per urology D/C neal Rack urine for 24hrs post CBI Ok w/ CT w/ IV pyelogram  
 CT of ABD and pelvis (20) - Findings consistent with acute cystitis Hold [facility] for now 
 cystoscopy on outpatient basis if needed per urology Urology consult- appreciate recs ICM - Stage D,  LVEF 15%, NYHA Class IV improved to NYHA Class II s/p HM3 LVAD as DT on 11/18/19 with Impella 5.0 as bridge to LVAD Continue LVAD speed at 5800 rpm  
 TTE 4/20 shows large LVIDd, 6.84 cm, RVIDd 3.06 cm, TAPSE 1.15 cm, severely elevated CVP Repeat TTE (7/6/20) LVEF <15% mod dilated RV, mild dilated RA, mild AI, mild MR, LVIDd 6.91cm, TAPSE 1.39cm, RVIDd 5.16cm Continue digoxin 0.125 mg PO daily to support RV, trend daily levels Intolerant to BB d/t RV failure Intolerant to [place] due to persistent orthostasis despite increased fluid intake Intolerant to spironolactone due to IVVD and hyponatremia Monitor CardioMEMs readings HTN, goal MAP 70-90 mmHg MAPs on LUE only d/t previous right axillary Impella cannulation H/o pseudomonal infection [facility] 4/20 - 2/4 bottles growing pseudomonas UA negative for bacteria Continue cipro indefinitely Chronic anticoagulation, goal INR 1.5-2.0 INR 1.2 Hold ASA and coumadin due to hematuria Will need to resume at some point Monitor MSSA drive line infection Repeat culture from 5/7/20- MSSA Gentamicin to exit site with daily dressing changes CT x 3 reviewed by Dr. Frida Gorman - small fluid collection adjacent to previous real drain tract has resolved Continue daily dressing changes Trend CTs Tremors Resolved Continue keppra 250 mg BID Weaned off clonazepam  
 
BPH On finasteride and tamsulosin PSA 0.4 on 7/5 Depression On Effexor -  mg daily Persistently elevated CEA Persistently elevated CEA; PET scan shows increased activity RML of lung Oncology consult appreciated - PET not suggestive of malignancy Orthostatic Hypotension Persistent Off  [place] Encourage use of CAROL hose PAF Digoxin 0.125 mg PO daily Intolerant of BB d/t RV failure On warfarin- on hold due to hematuria Debility Improving Continue working with PT Horse voice/VC paralysis ENT consult All other care per primary. CARDIAC IMAGING: 
Chest CTA- no outflow graft occlusion Pet Scan equivocal for amyloid 04/20/20 ECHO ADULT COMPLETE 04/21/2020 4/21/2020 Narrative · Image quality for this study was technically difficult. · Normal wall thickness. Severely dilated left ventricle. Severe systolic  
function. Estimated left ventricular ejection fraction is <15%. LVAD  
present. 5800 rpm 
· Dilated left atrium. · RV Not well visualized. Mildly reduced systolic function. · Mild aortic valve regurgitation is present. Aortic valve does not open  
in systole. · Mild mitral valve regurgitation is present. · Mild to moderate tricuspid valve regurgitation is present. · Severely elevated central venous pressure (15+ mmHg); IVC diameter is  
larger than 21 mm and collapses less than 50% with respiration. Signed by: Kishan Almodovar MD  
 
 
 
10/25/19 ECHO ADULT COMPLETE 01/29/2020 1/29/2020 Narrative · Severely dilated left ventricle. Upper normal wall thickness. Severe  
systolic dysfunction. Estimated left ventricular ejection fraction is  
<15%. Left ventricular diastolic dysfunction. · Mitral valve thickening. Minimal mitral valve prolapse of the anterior  
mitral valve leaflet. Trace mitral valve regurgitation is present. · Mild tricuspid valve regurgitation is present. · Mildly dilated right ventricle. Moderately reduced RV systolic function (TAPSE 1.3 cm, RV S' 6 cm/s). Pacer/ICD present. · Mildly biatrial enlargement · Severely elevated central venous pressure (15+ mmHg); IVC diameter is  
larger than 21 mm and collapses less than 50% with respiration. · LVAD inflow cannula spectral Doppler tracings not obtained. Signed by: Sharla Quarles MD  
 
  
  
OTHER IMAGING: 
Head CT negative for acute process EEG suggestive of mild generalized encephalopathic process, possibly related to underlying structural brain injury vs metabolic abnormality Review of Systems Constitutional: Negative for chills, fever and malaise/fatigue. Respiratory: Negative for cough, sputum production and shortness of breath. Cardiovascular: Negative for chest pain, palpitations, leg swelling and PND. Gastrointestinal: Negative for blood in stool, heartburn, nausea and vomiting. Genitourinary: Positive for hematuria. Musculoskeletal: Negative. Neurological: Negative for dizziness, weakness and headaches. Endo/Heme/Allergies: Does not bruise/bleed easily. Psychiatric/Behavioral: Negative. Visit Vitals Pulse 85 Temp 98.2 °F (36.8 °C) Resp 19 Ht 6' 2\" (1.88 m) Wt 178 lb 2.1 oz (80.8 kg) SpO2 96% BMI 22.87 kg/m² LVAD Pump Speed (RPM): 5800 Pump Flow (LPM): 4.8 MAP: 88 
PI (Pulsitility Index): 3.1 Power: 4.5  Test: No 
Back Up  at Bedside & Labeled: Yes Power Module Test: No 
Driveline Site Care: No 
Driveline Dressing: Clean, Dry, and Intact Outpatient: No 
MAP in Therapeutic Range (Outpatient): Yes Testing Alarms Reviewed: Yes 
Back up SC speed: 5800 Back up Low Speed Limit: 5400 Emergency Equipment with Patient?: Yes Emergency procedures reviewed?: No 
Drive line site inspected?: (site covered by dressing) Drive line intergrity inspected?: Yes Drive line dressing changed?: No 
  
 
Physical Exam  
Constitutional: He is oriented to person, place, and time. Vital signs are normal. He appears well-developed and well-nourished. No distress. Eyes: Pupils are equal, round, and reactive to light. Neck: Normal range of motion. No JVD present. Cardiovascular: Normal rate, regular rhythm and normal heart sounds. + VAD hum, AICD GARCÍA chest  
Pulmonary/Chest: Effort normal and breath sounds normal. No respiratory distress. Abdominal: Soft. Bowel sounds are normal. He exhibits no distension. Genitourinary:    Genitourinary Comments: Lizama draining pink urine Musculoskeletal: Normal range of motion. General: No edema. Comments: +1BLE- improved Neurological: He is alert and oriented to person, place, and time. Skin: Skin is warm, dry and intact. Bruising and ecchymosis noted. Drive line site, dressing dry and intact Psychiatric: He has a normal mood and affect. Nursing note and vitals reviewed. No results found for this or any previous visit (from the past 12 hour(s)). PAST MEDICAL HISTORY: 
Past Medical History:  
Diagnosis Date  Degenerative disc disease, lumbar  Heart failure ([facility] [address]  High cholesterol  Hypertension  Paroxysmal atrial fibrillation ([facility] [address] 4/2/2019  Spinal stenosis PAST SURGICAL HISTORY: 
Past Surgical History:  
Procedure Laterality Date  COLONOSCOPY Left 11/11/2019 COLONOSCOPY at bedside performed by Anitra Sullivan MD at [address]  HX CORONARY ARTERY BYPASS GRAFT    
 triple  HX HEART ASSIST DEVICE Left 10/29/2019 Impella 5.0  
 HX HEART ASSIST DEVICE Left 11/18/2019 HeartMate 3  
 HX HERNIA REPAIR    
 HX IMPLANTABLE CARDIOVERTER DEFIBRILLATOR  HX THROMBECTOMY Left 11/25/2019 Left brachial thrombectomy  SD CARDIOVERSION ELECTIVE ARRHYTHMIA EXTERNAL N/A 6/10/2019 EP CARDIOVERSION performed by Ruth Manning MD at [place] [address], [facility] [address] CATH LAB  SD CARDIOVERSION ELECTIVE ARRHYTHMIA EXTERNAL N/A 6/18/2019 EP CARDIOVERSION performed by Maxine Conn MD at [place] [address], [facility] [address] CATH LAB  SD INSJ/RPLCMT PERM DFB W/TRNSVNS LDS 1/DUAL CHMBR N/A 6/21/2019 INSERT ICD BIV MULTI performed by Edgardo Roldan MD at [place] [address], [facility] [address] CATH LAB  SD TCAT IMPL WRLS P-ART PRS SNR L-T HEMODYN MNTR N/A 9/18/2019 IMPLANT HEART FAILURE MONITORING DEVICE performed by Rita Mancilla MD at [place] [address], [facility] [address] CATH LAB FAMILY HISTORY: 
Family History Problem Relation Age of Onset  Lung Disease Mother  Hypertension Mother [facility] Arthritis-osteo Mother  Heart Disease Mother  Heart Disease Father  Diabetes Father SOCIAL HISTORY: 
Social History Socioeconomic History  Marital status:  Spouse name: Not on file  Number of children: Not on file  Years of education: Not on file  Highest education level: Not on file Tobacco Use  Smoking status: Former Smoker Last attempt to quit: 2010 Years since quittin.6  Smokeless tobacco: Never Used Substance and Sexual Activity  Alcohol use: Not Currently Comment: rarely  Drug use: Never Other Topics Concern ALLERGY: 
Allergies Allergen Reactions  Cefepime Other (comments)  
  myoclonus CURRENT MEDICATIONS: 
 
Current Facility-Administered Medications:  
  budesonide (PULMICORT) 500 mcg/2 ml nebulizer suspension, 500 mcg, Nebulization, QPM, Rafat Mckeon MD, 500 mcg at 20   fluticasone propionate (FLONASE) 50 mcg/actuation nasal spray 2 Spray, 2 Spray, Both Nostrils, DAILY, Levi, Shana B, NP, 2 Spray at 20 2636   ondansetron (ZOFRAN) injection 4 mg, 4 mg, IntraVENous, Q4H PRN, Levi, Shana B, NP 
  albuterol (PROVENTIL VENTOLIN) nebulizer solution 2.5 mg, 2.5 mg, Nebulization, Q4H PRN, Levi, Shana B, NP 
  docusate sodium (COLACE) capsule 100 mg, 100 mg, Oral, BID, Levi, Shana B, NP, 100 mg at 20 3951   hydrALAZINE (APRESOLINE) 20 mg/mL injection 10 mg, 10 mg, IntraVENous, Q4H PRN, Levi, Shana B, NP 
  hydrALAZINE (APRESOLINE) 20 mg/mL injection 20 mg, 20 mg, IntraVENous, Q4H PRN, Levi, Shana B, NP 
  0.9% sodium chloride infusion 250 mL, 250 mL, IntraVENous, PRN, Levi, Shana B, NP 
  0.9% sodium chloride infusion 250 mL, 250 mL, IntraVENous, PRN, Sophia Salgado PA-C 
  sodium chloride (NS) flush 5-40 mL, 5-40 mL, IntraVENous, Q8H, Erika Leonard, Karina Villareal MD, 10 mL at 07/07/20 5464   sodium chloride (NS) flush 5-40 mL, 5-40 mL, IntraVENous, PRN, Blaze Llanes MD, 10 mL at 07/03/20 1035   tamsulosin (FLOMAX) capsule 0.8 mg, 0.8 mg, Oral, QHS, Blaze Llanes MD, 0.8 mg at 07/06/20 2129 
  venlafaxine-SR (EFFEXOR-XR) capsule 150 mg, 150 mg, Oral, DAILY WITH BREAKFAST, Blaze Llanes MD, 150 mg at 07/07/20 0826 
  levETIRAcetam (KEPPRA) tablet 250 mg, 250 mg, Oral, BID, Blaze Llanes MD, 250 mg at 07/07/20 1652 
  finasteride (PROSCAR) tablet 5 mg, 5 mg, Oral, DAILY, Blaze Llanes MD, 5 mg at 07/06/20 2129   digoxin (LANOXIN) tablet 0.125 mg, 0.125 mg, Oral, EVERY OTHER DAY, Blaze Llanes MD, 0.125 mg at 07/06/20 1141   ciprofloxacin HCl (CIPRO) tablet 750 mg, 750 mg, Oral, Q12H, Blaze Llanes MD, 750 mg at 07/07/20 1053 Thank you for letting us see him with you, TIERRA Quick [address], [address] Office [phone] Fax [phone] [facility] ATTENDING ADDENDUM Patient was seen and examined in person. Data and notes were reviewed. I have discussed and agree with the plan as noted in the NP note above without further additions. Daniel Islas MD PhD 
[address] [facility]

## 2020-07-07 NOTE — PROGRESS NOTES
1930 Bedside and Verbal shift change report given to Maday Jefferson (oncoming nurse) by Jeff Blair RN (offgoing nurse). Report included the following information SBAR, Kardex, MAR, Cardiac Rhythm Paced and Alarm Parameters . 0730 Bedside and Verbal shift change report given to Daysi Bright (oncoming nurse) by Dave Rowland RN (offgoing nurse). Report included the following information SBAR, Kardex, MAR, Cardiac Rhythm Paced and Alarm Parameters .

## 2020-07-07 NOTE — PROGRESS NOTES
[facility] VAD/Heart Failure Progress Note Admit Date: 2020 POD:  * No surgery found * Procedure:      
 
 Subjective:  
Hematuria improved; mild fatigue and weakness; good spirits Objective:  
Vitals: 
Pulse 83, temperature 98.4 °F (36.9 °C), resp. rate 18, height 6' 2\" (1.88 m), weight 178 lb 2.1 oz (80.8 kg), SpO2 100 %. Temp (24hrs), Av.2 °F (36.8 °C), Min:97.7 °F (36.5 °C), Max:98.4 °F (36.9 °C) Hemodynamics: 
 CO:   
 CI:   
 CVP:   
 SVR:   
 PAP Systolic:   
 PAP Diastolic:   
 PVR:   
 EX73:   
 SCV02:   
 
VAD Interrogation: LVAD (Heartmate) Pump Speed (RPM): 5800 Pump Flow (LPM): 4.9 Chatter in Lines: No 
PI (Pulsitility Index): 3.4 Power: 4.5 MAP: 92 
 Test: No 
Back Up  at Bedside & Labeled: Yes Power Module Test: No 
Driveline Site Care: Yes Driveline Dressing: Clean, Dry, and Intact EKG/Rhythm:   
 
Extubation Date / Time:  
 
CT Output:  
 
Ventilator: 
  
 
Oxygen Therapy: 
Oxygen Therapy O2 Sat (%): 100 % (20 153) Pulse via Oximetry: 78 beats per minute (20 1957) O2 Device: Room air (20 1536) O2 Flow Rate (L/min): 2 l/min (20 2355) CXR: 
 
Admission Weight: Last Weight Weight: 185 lb 10 oz (84.2 kg) Weight: 178 lb 2.1 oz (80.8 kg) Intake / Output / Drain: 
Current Shift:  07 -  1900 In: 7364 [P.O.:480] Out: 2200 [Urine:2200] Last 24 hrs.:  
 
Intake/Output Summary (Last 24 hours) at 2020 1607 Last data filed at 2020 1153 Gross per 24 hour Intake 6470 ml Output 6225 ml Net 245 ml No results for input(s): CPK, CKMB, TROIQ in the last 72 hours. Recent Labs  
  20 
0300 20 
0238 20 
0309  138 136  
K 4.2 4.3 4.2 CO2 22 22 23 BUN 17 18 18 CREA 1.15 1.24 1.19 GLU 92 94 93 MG 2.0 2.0 2.0 WBC 4.3 4.2 3.6* HGB 8.4* 8.3* 8.0*  
HCT 27.2* 26.5* 26.2*  
 161 154 Recent Labs  
  20 0300 07/06/20 
0238 07/05/20 
0309 INR 1.2* 1.2* 1.2* PTP 11.9* 12.0* 12.4* No lab exists for component: PBNP Current Facility-Administered Medications:  
  budesonide (PULMICORT) 500 mcg/2 ml nebulizer suspension, 500 mcg, Nebulization, QPM, Lul Hood MD, 500 mcg at 07/06/20 1956   fluticasone propionate (FLONASE) 50 mcg/actuation nasal spray 2 Spray, 2 Spray, Both Nostrils, DAILY, Levi, Shana B, NP, 2 Spray at 07/07/20 2800   ondansetron (ZOFRAN) injection 4 mg, 4 mg, IntraVENous, Q4H PRN, Levi, Shana B, NP 
  albuterol (PROVENTIL VENTOLIN) nebulizer solution 2.5 mg, 2.5 mg, Nebulization, Q4H PRN, Levi, Shana B, NP 
  docusate sodium (COLACE) capsule 100 mg, 100 mg, Oral, BID, Levi, Shana B, NP, 100 mg at 07/07/20 7933   hydrALAZINE (APRESOLINE) 20 mg/mL injection 10 mg, 10 mg, IntraVENous, Q4H PRN, Levi, Shana B, NP 
  hydrALAZINE (APRESOLINE) 20 mg/mL injection 20 mg, 20 mg, IntraVENous, Q4H PRN, Levi, Shana B, NP 
  0.9% sodium chloride infusion 250 mL, 250 mL, IntraVENous, PRN, Levi, Shana B, NP 
  0.9% sodium chloride infusion 250 mL, 250 mL, IntraVENous, PRN, Michael Reddy PA-C 
  sodium chloride (NS) flush 5-40 mL, 5-40 mL, IntraVENous, Q8H, Elisha Dixon MD, 10 mL at 07/07/20 1454   sodium chloride (NS) flush 5-40 mL, 5-40 mL, IntraVENous, PRN, Elisha Dixon MD, 10 mL at 07/03/20 1035   tamsulosin (FLOMAX) capsule 0.8 mg, 0.8 mg, Oral, QHS, Elisha Dixon MD, 0.8 mg at 07/06/20 2129 
  venlafaxine-SR (EFFEXOR-XR) capsule 150 mg, 150 mg, Oral, DAILY WITH BREAKFAST, Elisha Dixon MD, 150 mg at 07/07/20 0826 
  levETIRAcetam (KEPPRA) tablet 250 mg, 250 mg, Oral, BID, Elisha Dixon MD, 250 mg at 07/07/20 7099 
  finasteride (PROSCAR) tablet 5 mg, 5 mg, Oral, DAILY, Elisha Dixon MD, 5 mg at 07/06/20 2129   digoxin (LANOXIN) tablet 0.125 mg, 0.125 mg, Oral, EVERY OTHER DAY, Tabatha Yañez MD, 0.125 mg at 07/06/20 1141   ciprofloxacin HCl (CIPRO) tablet 750 mg, 750 mg, Oral, Q12H, Tabatha Yañez MD, 750 mg at 07/07/20 1053 A/P Hematuria - urology following  
S/P LVAD - good flows Need for anticoagulation - heparin / coumadin when appropriate  
HTN - home meds  
  
Risk of morbidity and mortality - high Medical decision making - high complexity 
  
 
Signed By: Cara Clancy MD

## 2020-07-07 NOTE — PROGRESS NOTES
Patient: Ede Jarquin MRN: [id number]  SSN: [id number] YOB: 1950  Age: 71 y.o. Sex: male ADMITTED: 2020 to Dean Owens MD by Kristian Olvera MD for Hematuria [R31.9] Anemia [D64.9] POD# * No surgery found * Seen and examined by Dr. Morse Monday on  for gross hematuria x2 weeks. Hx of coumadin/ASA that has since been held. Repaged regarding ongoing gross hematuria when attempting to discontinue CBI. Original plan was for CT-IVP with outpatient cysto. CT-IVP showed acute cystitis with no evidence of urolithiasis or masses. Cardiac NP was interested when we could restart coumadin and if patient could have inpatient cysto. To note, patient had a cystoscopy in 2019 which showed catheter cystitis Lizama in place. CBI stopped this morning. UA currently clear light yellow. Afebrile; VSS Hgb: 8.4 Cr: 1.15 CT- IVP: 
IMPRESSION: 
  
1. Findings consistent with acute cystitis. Bladder is partially decompressed 
with Lizama catheter in place. 2. No evidence of urolithiasis. No evidence of solid renal, ureteral or bladder 
mass. Multifocal cortical scarring throughout the left kidney, and cortical 
scarring in the lower pole of the right kidney. 3. Mild pulmonary interstitial edema and small bilateral pleural effusions. Vitals: Temp (24hrs), Av.1 °F (36.7 °C), Min:97.7 °F (36.5 °C), Max:98.4 °F (36.9 °C) Pulse 79, temperature 97.7 °F (36.5 °C), resp. rate 18, height 6' 2\" (1.88 m), weight 80.8 kg (178 lb 2.1 oz), SpO2 94 %. Intake and Output: 
1901 -  0700 In: 21518 [P.O.:960] Out: 67742 [[facility]:[id number]]  07 -  190 In: 240 [P.O.:240] Out: 1350 [EPWQ:0878] THERESE Output lats 24 hrs: No data found. THERESE Output last 8 hrs: No data found. BM over last 24 hrs: No data found. Labs: CBC:  
Lab Results Component Value Date/Time  WBC 4.3 2020 03:00 AM  
 HCT 27.2 (L) 2020 03:00 AM  
 PLATELET 446 50/89/6738 03:00 AM  
 
BMP:  
Lab Results Component Value Date/Time Glucose 92 07/07/2020 03:00 AM  
 Sodium 136 07/07/2020 03:00 AM  
 Potassium 4.2 07/07/2020 03:00 AM  
 Chloride 107 07/07/2020 03:00 AM  
 CO2 22 07/07/2020 03:00 AM  
 BUN 17 07/07/2020 03:00 AM  
 Creatinine 1.15 07/07/2020 03:00 AM  
 Calcium 8.4 (L) 07/07/2020 03:00 AM  
 
Assessment/Plan:  
 
Hold CBI. Dr. Janny Kelly will be by later this morning to assess UA. Patient had a cystoscopy in November 2019 which showed catheter cystitis. Suspect this bleeding may be related to neal insertion. Awaiting plan per MD and will update cardiac NP. Signed By: Eloisa Tavera NP - July 7, 2020 Urine is completely clear with CBI off.  
CT/IVP shows no worrisome findings. D/C neal and monitor urine.

## 2020-07-08 NOTE — PROGRESS NOTES
0730:  Bedside shift change report given to Merlene Tillman RN (oncoming nurse) by KATRIN De La Cruz (offgoing nurse). Report included the following information SBAR, Kardex, Procedure Summary, Intake/Output, MAR, and Recent Results. 1145:  RN paged [facility] to clarify urine rack orders and possible discharge today. Rand Rollins NP returned page and informed RN Urology ordered it and to check with them. 1215:  RN clarified with Urology that they did not order urine racking and if urine is clear it is ok to stop and patient is clear for discharge. 1450: I have reviewed discharge instructions with the patient. The patient verbalized understanding. Problem: Falls - Risk of 
Goal: *Absence of Falls Description: Document Brayden Singh Fall Risk and appropriate interventions in the flowsheet. Outcome: Progressing Towards Goal 
Note: Fall Risk Interventions: 
Mobility Interventions: Patient to call before getting OOB, Utilize walker, cane, or other assistive device, Utilize gait belt for transfers/ambulation Medication Interventions: Teach patient to arise slowly, Utilize gait belt for transfers/ambulation, Patient to call before getting OOB Elimination Interventions: Call light in reach, Patient to call for help with toileting needs, Urinal in reach Problem: Anemia Care Plan (Adult and Pediatric) Goal: *Labs within defined limits Outcome: Progressing Towards Goal 
  
Problem: Pressure Injury - Risk of 
Goal: *Prevention of pressure injury Description: Document Mich Scale and appropriate interventions in the flowsheet. Outcome: Progressing Towards Goal 
Note: Pressure Injury Interventions: Activity Interventions: Increase time out of bed, Pressure redistribution bed/mattress(bed type) Mobility Interventions: Pressure redistribution bed/mattress (bed type), HOB 30 degrees or less Nutrition Interventions: Document food/fluid/supplement intake Problem: Heart Failure: Day 5 Goal: Off Pathway (Use only if patient is Off Pathway) Outcome: Progressing Towards Goal

## 2020-07-08 NOTE — PROGRESS NOTES
[address] [facility] Adult  Hospitalist Group Hospitalist Progress Note Ajit Stout MD 
Answering service: [phone] from in house phone NAME:  Milli Smiley :  1950 MRN:  [id number] Admission Summary:  
Milli Smiley is a 71 y.o. male past medical history significant for nonischemic cardiomyopathy, congestive heart failure with LVAD presented emergency room complaining of blood in the urine. Patient states that for the last 3 weeks ever noticed some blood in his urine but has worsened in the last few days. He is currently on Coumadin and reports compliant with medication. He had blood test ordered by the heart failure clinic and he was found to have a hemoglobin of 6. Interval history / Subjective:  
Patient without complaints Hb low/stable today CBI stopped and catheter removed today Assessment & Plan: 1. Acute on chronic anemia: due to acute hematuria 
- transfused 2 units of PRBC on 20  
- Monitor Hb- 8.4 today and appears stable 
  
2. Hematuria: in the setting of anticoagulation however INR was only 1.2. 
- Urology consulted-  CT with IVP done today showing cystitis and no kidney issues  
- CBI stopped and neal removed, holding anticoagulation 
  
3. Chronic UTI: on chronic cipro as per ID recommendations. Now with hematuria but no other sign of infection 
- urine culture was negative - will continue current therapy 
  
4. ICM - Stage D,  LVEF 15%, NYHA Class IV improved to NYHA Class III s/p HM3 LVAD  
- Heart failure consult to assist with LVAD Heart failure team adjusting meds 
 
 5. PAF 
-back on digoxin 
 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 Subtherapeutic INR but now on hold due to hematuria 
  
6. BPH 
- continue finasteride and tamsulosin 
  
7. JOSE: holding off on cpap and resumed nocturnal oxygen Stable SO2 levels overnight Code status: FULL 
DVT prophylaxis: SCDs Care Plan discussed with: Patient/Family Anticipated Disposition: Home w/Family Anticipated Discharge: Greater than 48 hours Hospital Problems  Date Reviewed: 3/23/2020 Codes Class Noted POA Anemia ICD-10-CM: D64.9 ICD-9-CM: 285.9  7/2/2020 Unknown Hematuria ICD-10-CM: R31.9 ICD-9-CM: 599.70  7/2/2020 Unknown Review of Systems: A comprehensive review of systems was negative except for that written in the HPI. Vital Signs:  
 Last 24hrs VS reviewed since prior progress note. Most recent are: 
Visit Vitals Pulse 89 Temp 98.2 °F (36.8 °C) Resp 18 Ht 6' 2\" (1.88 m) Wt 80.8 kg (178 lb 2.1 oz) SpO2 96% BMI 22.87 kg/m² Intake/Output Summary (Last 24 hours) at 7/7/2020 2323 Last data filed at 7/7/2020 1925 Gross per 24 hour Intake 3760 ml Output 4370 ml Net -610 ml Physical Examination:  
 
 
     
Constitutional:  No acute distress, cooperative, pleasant ENT:  Oral mucosa moist, . Resp:  CTA bilaterally. No wheezing/rhonchi/rales. No accessory muscle use CV:  audible LVAD pump GI:  Soft, non distended, non tender. Musculoskeletal:  No edema, warm, 2+ pulses throughout Neurologic:  Moves all extremities. AAOx3, CN II-XII reviewed Psych:  Good insight, Not anxious nor agitated. Data Review:  
 Review and/or order of clinical lab test 
Review and/or order of tests in the radiology section of CPT Review and/or order of tests in the medicine section of CPT Labs:  
 
Recent Labs  
  07/07/20 0300 07/06/20 
0238 WBC 4.3 4.2 HGB 8.4* 8.3* HCT 27.2* 26.5*  
 161 Recent Labs  
  07/07/20 
0300 07/06/20 
0238 07/05/20 
0309  138 136  
K 4.2 4.3 4.2  109* 107 CO2 22 22 23 BUN 17 18 18 CREA 1.15 1.24 1.19 GLU 92 94 93 CA 8.4* 8.1* 7.5* MG 2.0 2.0 2.0 Recent Labs  
  07/07/20 
0300 07/06/20 2299 07/05/20 
6589 ALT 9* 11* 8*  112 116 TBILI 0.6 0.4 0.4 TP 6.4 6.2* 5.6* ALB 2.5* 2.5* 2.4*  
GLOB 3.9 3.7 3.2 Recent Labs  
  07/07/20 
0300 07/06/20 
0238 07/05/20 
0309 INR 1.2* 1.2* 1.2* PTP 11.9* 12.0* 12.4* No results for input(s): FE, TIBC, PSAT, FERR in the last 72 hours. Lab Results Component Value Date/Time Folate 16.5 01/16/2020 04:57 AM  
  
No results for input(s): PH, PCO2, PO2 in the last 72 hours. No results for input(s): CPK, CKNDX, TROIQ in the last 72 hours. No lab exists for component: CPKMB Lab Results Component Value Date/Time Cholesterol, total 90 10/26/2019 04:09 AM  
 HDL Cholesterol 21 10/26/2019 04:09 AM  
 LDL, calculated 56.2 10/26/2019 04:09 AM  
 Triglyceride 64 10/26/2019 04:09 AM  
 CHOL/HDL Ratio 4.3 10/26/2019 04:09 AM  
 
Lab Results Component Value Date/Time Glucose (POC) 99 01/27/2020 11:11 AM  
 Glucose (POC) 137 (H) 01/07/2020 11:16 AM  
 Glucose (POC) 115 (H) 01/07/2020 06:52 AM  
 Glucose (POC) 106 (H) 01/06/2020 09:17 PM  
 Glucose (POC) 122 (H) 01/06/2020 04:17 PM  
 
Lab Results Component Value Date/Time Color YELLOW/STRAW 07/02/2020 07:22 PM  
 Appearance CLEAR 07/02/2020 07:22 PM  
 Specific gravity 1.018 07/02/2020 07:22 PM  
 Specific gravity 1.015 10/31/2019 11:00 AM  
 pH (UA) 7.0 07/02/2020 07:22 PM  
 Protein 300 (A) 07/02/2020 07:22 PM  
 Glucose Negative 07/02/2020 07:22 PM  
 Ketone Negative 07/02/2020 07:22 PM  
 Bilirubin Negative 07/02/2020 07:22 PM  
 Urobilinogen 0.2 07/02/2020 07:22 PM  
 Nitrites Negative 07/02/2020 07:22 PM  
 Leukocyte Esterase Negative 07/02/2020 07:22 PM  
 Epithelial cells FEW 07/02/2020 07:22 PM  
 Bacteria Negative 07/02/2020 07:22 PM  
 WBC 10-20 07/02/2020 07:22 PM  
 RBC >100 (H) 07/02/2020 07:22 PM  
 
 
 
Medications Reviewed:  
 
Current Facility-Administered Medications Medication Dose Route Frequency  budesonide (PULMICORT) 500 mcg/2 ml nebulizer suspension  500 mcg Nebulization QPM  
 fluticasone propionate (FLONASE) 50 mcg/actuation nasal spray 2 Spray  2 Spray Both Nostrils DAILY  ondansetron (ZOFRAN) injection 4 mg  4 mg IntraVENous Q4H PRN  
 albuterol (PROVENTIL VENTOLIN) nebulizer solution 2.5 mg  2.5 mg Nebulization Q4H PRN  
 docusate sodium (COLACE) capsule 100 mg  100 mg Oral BID  hydrALAZINE (APRESOLINE) 20 mg/mL injection 10 mg  10 mg IntraVENous Q4H PRN  
 hydrALAZINE (APRESOLINE) 20 mg/mL injection 20 mg  20 mg IntraVENous Q4H PRN  
 0.9% sodium chloride infusion 250 mL  250 mL IntraVENous PRN  
 0.9% sodium chloride infusion 250 mL  250 mL IntraVENous PRN  
 sodium chloride (NS) flush 5-40 mL  5-40 mL IntraVENous Q8H  
 sodium chloride (NS) flush 5-40 mL  5-40 mL IntraVENous PRN  
 tamsulosin (FLOMAX) capsule 0.8 mg  0.8 mg Oral QHS  venlafaxine-SR (EFFEXOR-XR) capsule 150 mg  150 mg Oral DAILY WITH BREAKFAST  levETIRAcetam (KEPPRA) tablet 250 mg  250 mg Oral BID  finasteride (PROSCAR) tablet 5 mg  5 mg Oral DAILY  digoxin (LANOXIN) tablet 0.125 mg  0.125 mg Oral EVERY OTHER DAY  ciprofloxacin HCl (CIPRO) tablet 750 mg  750 mg Oral Q12H  
 
______________________________________________________________________ EXPECTED LENGTH OF STAY: 2d 16h ACTUAL LENGTH OF STAY:          5 Adina Stark MD

## 2020-07-08 NOTE — CDMP QUERY
#1 
 
Pt admitted with hematuria. Pt noted to have neal insertion and abnormal abdominal CT. If possible, please document in the progress notes and d/c summary if you are evaluating and / or treating any of the following: ? Hematuria  due to acute cystitis POA ? Hematuria due to neal urethra  insertion trauma ? Hematuria unrelated to cystitis ? Hematuria unrelated to catheter   
? Other, please specify ? Clinically unable to determine The medical record reflects the following: 
  Risk Factors: chronic UTI Clinical Indicators:  
7/6-CT 1. Findings consistent with acute cystitis. Bladder is partially decompressed 
with Neal catheter in place. 7/6-cardiology note CT of ABD and pelvis (7/6/20) - Findings consistent with acute cystitis 7/7-urology note Suspect this bleeding may be related to neal insertion. Awaiting plan per MD and will update cardiac NP. Treatment:urology consult, CT, lab monitoring, CBI, cipro po Thank you, 
       Yaquelin Stafford [facility]

## 2020-07-08 NOTE — DISCHARGE INSTRUCTIONS
Regarding Hematuria (blood in your urine) - this appears to have resolved. Continue to monitor at home  Stop aspirin and warfarin/coumadin   F/U with Dr Didier Britton - urology- as scheduled. Follow up with the advanced heart failure team on Monday of next week     Learning About Heart Failure Zones  What are heart failure zones? Heart failure zones give you an easy way to see changes in your heart failure symptoms. They also tell you when you need to get help. Check every day to see which zone you are in. Green zone. You are doing well. This is where you want to be. · Your weight is stable. It's not going up or down. · You breathe easily. · You are sleeping well. You are able to lie flat without shortness of breath. · You can do your usual activities. Yellow zone. Be careful. Your symptoms are changing. Call your doctor. · You have new or increased shortness of breath. · You are dizzy or lightheaded, or you feel like you may faint. · You have sudden weight gain, such as more than 2 to 3 pounds in a day or 5 pounds in a week. (Your doctor may suggest a different range of weight gain.)  · You have increased swelling in your legs, ankles, or feet. · You are so tired or weak that you can't do your usual activities. · You are not sleeping well. Shortness of breath wakes you up at night. You need extra pillows. Red zone. This is an emergency. Call 911. You have symptoms of sudden heart failure. For example:  · You have severe trouble breathing. · You cough up pink, foamy mucus. · You have a new irregular or fast heartbeat. You have symptoms of a heart attack. These may include:  · Chest pain or pressure, or a strange feeling in the chest.  · Sweating. · Shortness of breath. · Nausea or vomiting. · Pain, pressure, or a strange feeling in the back, neck, jaw, or upper belly or in one or both shoulders or arms. · Lightheadedness or sudden weakness. · A fast or irregular heartbeat.   If you have symptoms of a heart attack: After you call 911, the  may tell you to chew 1 adult-strength or 2 to 4 low-dose aspirin. Wait for an ambulance. Do not try to drive yourself. Follow-up care is a key part of your treatment and safety. Be sure to make and go to all appointments, and call your doctor if you are having problems. It's also a good idea to know your test results and keep a list of the medicines you take. Where can you learn more? Go to http://karoline-arash.info/  Enter T174 in the search box to learn more about \"Learning About Heart Failure Zones. \"  Current as of: December 16, 2019               Content Version: 12.5  © 2006-2020 [employer]. Care instructions adapted under license by [employer] (which disclaims liability or warranty for this information). If you have questions about a medical condition or this instruction, always ask your healthcare professional. [place] [address] any warranty or liability for your use of this information. Patient Education        Low Sodium Diet (2,000 Milligram): Care Instructions  Your Care Instructions     Too much sodium causes your body to hold on to extra water. This can raise your blood pressure and force your heart and kidneys to work harder. In very serious cases, this could cause you to be put in the hospital. It might even be life-threatening. By limiting sodium, you will feel better and lower your risk of serious problems. The most common source of sodium is salt. People get most of the salt in their diet from canned, prepared, and packaged foods. Fast food and restaurant meals also are very high in sodium. Your doctor will probably limit your sodium to less than 2,000 milligrams (mg) a day. This limit counts all the sodium in prepared and packaged foods and any salt you add to your food. Follow-up care is a key part of your treatment and safety.  Be sure to make and go to all appointments, and call your doctor if you are having problems. It's also a good idea to know your test results and keep a list of the medicines you take. How can you care for yourself at home? Read food labels  · Read labels on cans and food packages. The labels tell you how much sodium is in each serving. Make sure that you look at the serving size. If you eat more than the serving size, you have eaten more sodium. · Food labels also tell you the Percent Daily Value for sodium. Choose products with low Percent Daily Values for sodium. · Be aware that sodium can come in forms other than salt, including monosodium glutamate (MSG), sodium citrate, and sodium bicarbonate (baking soda). MSG is often added to Asian food. When you eat out, you can sometimes ask for food without MSG or added salt. Buy low-sodium foods  · Buy foods that are labeled \"unsalted\" (no salt added), \"sodium-free\" (less than 5 mg of sodium per serving), or \"low-sodium\" (less than 140 mg of sodium per serving). Foods labeled \"reduced-sodium\" and \"light sodium\" may still have too much sodium. Be sure to read the label to see how much sodium you are getting. · Buy fresh vegetables, or frozen vegetables without added sauces. Buy low-sodium versions of canned vegetables, soups, and other canned goods. Prepare low-sodium meals  · Cut back on the amount of salt you use in cooking. This will help you adjust to the taste. Do not add salt after cooking. One teaspoon of salt has about 2,300 mg of sodium. · Take the salt shaker off the table. · Flavor your food with garlic, lemon juice, onion, vinegar, herbs, and spices. Do not use soy sauce, lite soy sauce, steak sauce, onion salt, garlic salt, celery salt, mustard, or ketchup on your food. · Use low-sodium salad dressings, sauces, and ketchup. Or make your own salad dressings and sauces without adding salt. · Use less salt (or none) when recipes call for it.  You can often use half the salt a recipe calls for without losing flavor. Other foods such as rice, pasta, and grains do not need added salt. · Rinse canned vegetables, and cook them in fresh water. This removes some--but not all--of the salt. · Avoid water that is naturally high in sodium or that has been treated with water softeners, which add sodium. Call your local water company to find out the sodium content of your water supply. If you buy bottled water, read the label and choose a sodium-free brand. Avoid high-sodium foods  · Avoid eating:  ? Smoked, cured, salted, and canned meat, fish, and poultry. ? Ham, fan, hot dogs, and luncheon meats. ? Regular, hard, and processed cheese and regular peanut butter. ? Crackers with salted tops, and other salted snack foods such as pretzels, chips, and salted popcorn. ? Frozen prepared meals, unless labeled low-sodium. ? Canned and dried soups, broths, and bouillon, unless labeled sodium-free or low-sodium. ? Canned vegetables, unless labeled sodium-free or low-sodium. ? [place] fries, pizza, tacos, and other fast foods. ? Pickles, olives, ketchup, and other condiments, especially soy sauce, unless labeled sodium-free or low-sodium. Where can you learn more? Go to http://karoline-arash.info/  Enter V843 in the search box to learn more about \"Low Sodium Diet (2,000 Milligram): Care Instructions. \"  Current as of: August 22, 2019               Content Version: 12.5  © 5319-0276 [employer]. Care instructions adapted under license by [employer] (which disclaims liability or warranty for this information). If you have questions about a medical condition or this instruction, always ask your healthcare professional. [address] any warranty or liability for your use of this information.

## 2020-07-08 NOTE — PROGRESS NOTES
[address] [facility] Inpatient Progress Note Patient name: Leora Valadez Patient : 1950 Patient MRN: [id number] Date of service: 20 CHIEF COMPLAINT: 
Chief Complaint Patient presents with  Blood in Urine HPI: Javon Kiser is a 71y.o. year old white male with a history of HTN, HLD, JOSE, CAD s/p cardiac arrest VFib s/p CABG () c/b sternal would infection and sternectomy, ischemic cardiomyopathy LVEF 15-20%, s/p BiVICD  who underwent LVAD as DT on 2019 after bridging with Impella 5.0.  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 He was transferred to [facility] rehab on  and discharged from rehab on 2020. Carly Marin was seen  for an LVAD follow up visit. Due to fever, chills, and hypotension, he was referred to the ED, evaluated and admitted to [facility]. He underwent treatment for recurrent pseudomonas bacteremia due to UTI. His Cipro was resumed and he was discharged home in stable condition. Mr. Natalia Wolff presented to ED after routine labs showed a hgb drop from 10 to 6.8, he states he has had hematuria on and off for 2 weeks. He did note that he was more fatigued when working with PT at home. He denies other acute complaints. 24Hr Events CBI and neal D/C'd No acute overnight events 
remains Off AC- INR 1.1 Assessment/Plan: NYHA Class II Acute blood loss anemia due to hematuria Hgb stable post 2 unit PRBC transfusion Transfuse to keep hgb> 7 Urine racked for 24hrs post CBI Urine clear Ok w/ CT w/ IV pyelogram  
 CT of ABD and pelvis (20) - Findings consistent with acute cystitis D/C AC Check Von Willebrand multimers, iron profile/ferritin Cystoscopy on outpatient basis- per urology; will need to be readmitted if needed Urology consult- appreciate recs ICM - Stage D,  LVEF 15%, NYHA Class IV improved to NYHA Class II s/p HM3 LVAD as DT on 11/18/19 with Impella 5.0 as bridge to LVAD Continue LVAD speed at 5800 rpm  
 TTE 4/20 shows large LVIDd, 6.84 cm, RVIDd 3.06 cm, TAPSE 1.15 cm, severely elevated CVP Repeat TTE (7/6/20) LVEF <15% mod dilated RV, mild dilated RA, mild AI, mild MR, LVIDd 6.91cm, TAPSE 1.39cm, RVIDd 5.16cm Continue digoxin 0.125 mg PO daily to support RV, trend daily levels Intolerant to BB d/t RV failure Intolerant to [place] due to persistent orthostasis despite increased fluid intake Intolerant to spironolactone due to IVVD and hyponatremia Monitor CardioMEMs readings - unable to trend while in hospital  
 Continue [facility] for skilled nursing and PT Closely follow up with [facility] outpatient clinic, next appointment 7/13/20 HTN, goal MAP 70-90 mmHg MAPs on LUE only d/t previous right axillary Impella cannulation H/o pseudomonal infection [facility] 4/20 - 2/4 bottles growing pseudomonas UA negative for bacteria Continue cipro indefinitely Chronic anticoagulation, goal INR 1.5-2.0 INR 1.1 D/C ASA and coumadin due to hematuria Check Von Willebrand multimers, iron profile/ferritin Monitor MSSA drive line infection Repeat culture from 5/7/20- MSSA Gentamicin to exit site with daily dressing changes CT x 3 reviewed by Dr. Pineda - small fluid collection adjacent to previous real drain tract has resolved Continue daily dressing changes Trend CTs Tremors Resolved Continue keppra 250 mg BID Weaned off clonazepam  
 
BPH On finasteride and tamsulosin PSA 0.4 on 7/5 Depression On Effexor -  mg daily Persistently elevated CEA Persistently elevated CEA; PET scan shows increased activity RML of lung Oncology consult appreciated - PET not suggestive of malignancy Orthostatic Hypotension Persistent Off  [place] Encourage use of CAROL hose PAF Digoxin 0.125 mg PO daily Intolerant of BB d/t RV failure D Debility Improving Continue working with PT Horse voice/VC paralysis ENT consult All other care per primary. CARDIAC IMAGING: 
Chest CTA- no outflow graft occlusion Pet Scan equivocal for amyloid 04/20/20 ECHO ADULT COMPLETE 04/21/2020 4/21/2020 Narrative · Image quality for this study was technically difficult. · Normal wall thickness. Severely dilated left ventricle. Severe systolic  
function. Estimated left ventricular ejection fraction is <15%. LVAD  
present. 5800 rpm 
· Dilated left atrium. · RV Not well visualized. Mildly reduced systolic function. · Mild aortic valve regurgitation is present. Aortic valve does not open  
in systole. · Mild mitral valve regurgitation is present. · Mild to moderate tricuspid valve regurgitation is present. · Severely elevated central venous pressure (15+ mmHg); IVC diameter is  
larger than 21 mm and collapses less than 50% with respiration. Signed by: Alxe Falk MD  
 
 
 
10/25/19 ECHO ADULT COMPLETE 01/29/2020 1/29/2020 Narrative · Severely dilated left ventricle. Upper normal wall thickness. Severe  
systolic dysfunction. Estimated left ventricular ejection fraction is  
<15%. Left ventricular diastolic dysfunction. · Mitral valve thickening. Minimal mitral valve prolapse of the anterior  
mitral valve leaflet. Trace mitral valve regurgitation is present. · Mild tricuspid valve regurgitation is present. · Mildly dilated right ventricle. Moderately reduced RV systolic function (TAPSE 1.3 cm, RV S' 6 cm/s). Pacer/ICD present. · Mildly biatrial enlargement · Severely elevated central venous pressure (15+ mmHg); IVC diameter is  
larger than 21 mm and collapses less than 50% with respiration. · LVAD inflow cannula spectral Doppler tracings not obtained.  
   
  Signed by: Amanda Pulido MD  
 
  
  
 OTHER IMAGING: 
Head CT negative for acute process EEG suggestive of mild generalized encephalopathic process, possibly related to underlying structural brain injury vs metabolic abnormality Review of Systems Constitutional: Negative for chills, fever and malaise/fatigue. Respiratory: Negative for cough, sputum production and shortness of breath. Cardiovascular: Negative for chest pain, palpitations, leg swelling and PND. Gastrointestinal: Negative for blood in stool, heartburn, nausea and vomiting. Genitourinary: Negative for hematuria. Musculoskeletal: Negative. Neurological: Negative for dizziness, weakness and headaches. Endo/Heme/Allergies: Does not bruise/bleed easily. Psychiatric/Behavioral: Negative. Visit Vitals Pulse 80 Temp 98.1 °F (36.7 °C) Resp 20 Ht 6' 2\" (1.88 m) Wt 177 lb 14.6 oz (80.7 kg) SpO2 99% BMI 22.84 kg/m² LVAD Pump Speed (RPM): 5800 Pump Flow (LPM): 5 MAP: 88 
PI (Pulsitility Index): 3.3 Power: 4.5  Test: Yes 
Back Up  at Bedside & Labeled: Yes Power Module Test: Yes Driveline Site Care: No 
Driveline Dressing: Clean, Dry, and Intact Outpatient: No 
MAP in Therapeutic Range (Outpatient): Yes Testing Alarms Reviewed: Yes 
Back up SC speed: 5800 Back up Low Speed Limit: 5400 Emergency Equipment with Patient?: Yes Emergency procedures reviewed?: Yes Drive line site inspected?: Yes Drive line intergrity inspected?: Yes Drive line dressing changed?: No 
  
 
Physical Exam  
Constitutional: He is oriented to person, place, and time. Vital signs are normal. He appears well-developed and well-nourished. No distress. Eyes: Pupils are equal, round, and reactive to light. Neck: Normal range of motion. No JVD present. Cardiovascular: Normal rate, regular rhythm and normal heart sounds. + VAD hum, AICD GARCÍA chest  
Pulmonary/Chest: Effort normal and breath sounds normal. No respiratory distress. Abdominal: Soft. Bowel sounds are normal. He exhibits no distension. Musculoskeletal: Normal range of motion. General: No edema. Comments: +1BLE- improved Neurological: He is alert and oriented to person, place, and time. Skin: Skin is warm, dry and intact. Bruising and ecchymosis noted. Drive line site, dressing dry and intact Psychiatric: He has a normal mood and affect. Nursing note and vitals reviewed. Recent Results (from the past 12 hour(s)) METABOLIC PANEL, COMPREHENSIVE Collection Time: 07/08/20  3:54 AM  
Result Value Ref Range Sodium 135 (L) 136 - 145 mmol/L Potassium 4.1 3.5 - 5.1 mmol/L Chloride 105 97 - 108 mmol/L  
 CO2 24 21 - 32 mmol/L Anion gap 6 5 - 15 mmol/L Glucose 96 65 - 100 mg/dL BUN 18 6 - 20 MG/DL Creatinine 1.18 0.70 - 1.30 MG/DL  
 BUN/Creatinine ratio 15 12 - 20 GFR est AA >60 >60 ml/min/1.73m2 GFR est non-AA >60 >60 ml/min/1.73m2 Calcium 8.1 (L) 8.5 - 10.1 MG/DL Bilirubin, total 0.6 0.2 - 1.0 MG/DL  
 ALT (SGPT) 8 (L) 12 - 78 U/L  
 AST (SGOT) 11 (L) 15 - 37 U/L Alk. phosphatase 118 (H) 45 - 117 U/L Protein, total 6.3 (L) 6.4 - 8.2 g/dL Albumin 2.5 (L) 3.5 - 5.0 g/dL Globulin 3.8 2.0 - 4.0 g/dL A-G Ratio 0.7 (L) 1.1 - 2.2 MAGNESIUM Collection Time: 07/08/20  3:54 AM  
Result Value Ref Range Magnesium 2.0 1.6 - 2.4 mg/dL LD Collection Time: 07/08/20  3:54 AM  
Result Value Ref Range  85 - 241 U/L  
PROTHROMBIN TIME + INR Collection Time: 07/08/20  3:54 AM  
Result Value Ref Range INR 1.1 0.9 - 1.1 Prothrombin time 11.7 (H) 9.0 - 11.1 sec CBC W/O DIFF Collection Time: 07/08/20  3:54 AM  
Result Value Ref Range WBC 3.8 (L) 4.1 - 11.1 K/uL  
 RBC 2.85 (L) 4.10 - 5.70 M/uL HGB 8.8 (L) 12.1 - 17.0 g/dL HCT 28.1 (L) 36.6 - 50.3 % MCV 98.6 80.0 - 99.0 FL  
 MCH 30.9 26.0 - 34.0 PG  
 MCHC 31.3 30.0 - 36.5 g/dL  
 RDW 16.1 (H) 11.5 - 14.5 % PLATELET 007 480 - 875 K/uL MPV 8.7 (L) 8.9 - 12.9 FL  
 NRBC 0.0 0  WBC ABSOLUTE NRBC 0.00 0.00 - 0.01 K/uL NT-PRO BNP Collection Time: 07/08/20  3:54 AM  
Result Value Ref Range NT pro-BNP 3,990 (H) <125 PG/ML  
DIGOXIN Collection Time: 07/08/20  3:54 AM  
Result Value Ref Range Digoxin level 0.5 (L) 0.90 - 2.00 NG/ML  
FERRITIN Collection Time: 07/08/20  3:54 AM  
Result Value Ref Range Ferritin 126 26 - 388 NG/ML  
 
 
 
 
PAST MEDICAL HISTORY: 
Past Medical History:  
Diagnosis Date  Degenerative disc disease, lumbar  Heart failure ([facility]  High cholesterol  Hypertension  Paroxysmal atrial fibrillation ([facility] 4/2/2019  Spinal stenosis PAST SURGICAL HISTORY: 
Past Surgical History:  
Procedure Laterality Date  COLONOSCOPY Left 11/11/2019 COLONOSCOPY at bedside performed by Pradip Amado MD at [address] [place] [address]  HX CORONARY ARTERY BYPASS GRAFT    
 triple  HX HEART ASSIST DEVICE Left 10/29/2019 Impella 5.0  
 HX HEART ASSIST DEVICE Left 11/18/2019 HeartMate 3  
 HX HERNIA REPAIR    
 HX IMPLANTABLE CARDIOVERTER DEFIBRILLATOR  HX THROMBECTOMY Left 11/25/2019 Left brachial thrombectomy  OK CARDIOVERSION ELECTIVE ARRHYTHMIA EXTERNAL N/A 6/10/2019 EP CARDIOVERSION performed by Felicia Bell MD at [place] [address] CATH LAB  OK CARDIOVERSION ELECTIVE ARRHYTHMIA EXTERNAL N/A 6/18/2019 EP CARDIOVERSION performed by Jacquiline Olszewski, MD at [place] [address], [address] CATH LAB  OK INSJ/RPLCMT PERM DFB W/TRNSVNS LDS 1/DUAL CHMBR N/A 6/21/2019 INSERT ICD BIV MULTI performed by Hero Menchaca MD at [place] [address], [address] CATH LAB  OK TCAT IMPL WRLS P-ART PRS SNR L-T HEMODYN MNTR N/A 9/18/2019 IMPLANT HEART FAILURE MONITORING DEVICE performed by Won Bahena MD at [place] [address], [address] CATH LAB FAMILY HISTORY: 
Family History Problem Relation Age of Onset  Lung Disease Mother  Hypertension Mother [employer] Arthritis-osteo Mother  Heart Disease Mother  Heart Disease Father  Diabetes Father SOCIAL HISTORY: 
Social History Socioeconomic History  Marital status:  Spouse name: Not on file  Number of children: Not on file  Years of education: Not on file  Highest education level: Not on file Tobacco Use  Smoking status: Former Smoker Last attempt to quit: 2010 Years since quittin.6  Smokeless tobacco: Never Used Substance and Sexual Activity  Alcohol use: Not Currently Comment: rarely  Drug use: Never Other Topics Concern ALLERGY: 
Allergies Allergen Reactions  Cefepime Other (comments)  
  myoclonus CURRENT MEDICATIONS: 
 
Current Facility-Administered Medications:  
  budesonide (PULMICORT) 500 mcg/2 ml nebulizer suspension, 500 mcg, Nebulization, QPM, Arnetta Alpers, MD, 500 mcg at 20   fluticasone propionate (FLONASE) 50 mcg/actuation nasal spray 2 Spray, 2 Spray, Both Nostrils, DAILY, Levi, Shana B, NP, 2 Spray at 20 0700 
  ondansetron (ZOFRAN) injection 4 mg, 4 mg, IntraVENous, Q4H PRN, Levi, Shana B, NP 
  albuterol (PROVENTIL VENTOLIN) nebulizer solution 2.5 mg, 2.5 mg, Nebulization, Q4H PRN, Levi, Shana B, NP 
  docusate sodium (COLACE) capsule 100 mg, 100 mg, Oral, BID, Levi, Shana B, NP, 100 mg at 20 0373   hydrALAZINE (APRESOLINE) 20 mg/mL injection 10 mg, 10 mg, IntraVENous, Q4H PRN, Levi, Shana B, NP 
  hydrALAZINE (APRESOLINE) 20 mg/mL injection 20 mg, 20 mg, IntraVENous, Q4H PRN, Levi, Shana B, NP 
  0.9% sodium chloride infusion 250 mL, 250 mL, IntraVENous, PRN, Levi, Shana B, NP 
  0.9% sodium chloride infusion 250 mL, 250 mL, IntraVENous, PRN, Cristhian Monroy PA-C 
  sodium chloride (NS) flush 5-40 mL, 5-40 mL, IntraVENous, Q8H, Larisa Sosa MD, 10 mL at 20 0434   sodium chloride (NS) flush 5-40 mL, 5-40 mL, IntraVENous, PRN, Vianca Earl MD, 10 mL at 07/03/20 1035   tamsulosin (FLOMAX) capsule 0.8 mg, 0.8 mg, Oral, QHS, Vianca Earl MD, 0.8 mg at 07/07/20 2215 
  venlafaxine-SR (EFFEXOR-XR) capsule 150 mg, 150 mg, Oral, DAILY WITH Lilia Arboleda, Vianca Earl MD, 150 mg at 07/08/20 5931   levETIRAcetam (KEPPRA) tablet 250 mg, 250 mg, Oral, BID, Vianca Earl MD, 250 mg at 07/08/20 3770   finasteride (PROSCAR) tablet 5 mg, 5 mg, Oral, DAILY, Vianca Earl MD, 5 mg at 07/07/20 2215   digoxin (LANOXIN) tablet 0.125 mg, 0.125 mg, Oral, EVERY OTHER DAY, Vianca Earl MD, 0.125 mg at 07/08/20 4152   ciprofloxacin HCl (CIPRO) tablet 750 mg, 750 mg, Oral, Q12H, Vianca Earl MD, 750 mg at 07/07/20 2215 Thank you for letting us see him with you, TIERRA Hodge [address] [place] [address], [address] Office [phone] Fax [phone] [facility] ATTENDING ADDENDUM Patient was seen and examined in person. Data and notes were reviewed. I have discussed and agree with the plan as noted in the NP note above without further additions. Citlalli Montgomery MD PhD 
[facility] [facility]

## 2020-07-08 NOTE — PROGRESS NOTES
[facility] VAD/Heart Failure Progress Note Admit Date: 2020 POD:  * No surgery found * Procedure:      
 
 Subjective:  
Hematuria improved; mild weakness and fatigue; remains off AC Objective:  
Vitals: 
Pulse 80, temperature 98.1 °F (36.7 °C), resp. rate 20, height 6' 2\" (1.88 m), weight 177 lb 14.6 oz (80.7 kg), SpO2 99 %. Temp (24hrs), Av.3 °F (36.8 °C), Min:98.1 °F (36.7 °C), Max:98.5 °F (36.9 °C) Hemodynamics: 
 CO:   
 CI:   
 CVP:   
 SVR:   
 PAP Systolic:   
 PAP Diastolic:   
 PVR:   
 YO47:   
 SCV02:   
 
VAD Interrogation: LVAD (Heartmate) Pump Speed (RPM): 5800 Pump Flow (LPM): 5 Chatter in Lines: No 
PI (Pulsitility Index): 3.3 Power: 4.5 MAP: 84  Test: Yes 
Back Up  at Bedside & Labeled: Yes Power Module Test: Yes Driveline Site Care: No 
Driveline Dressing: Clean, Dry, and Intact EKG/Rhythm:   
 
Extubation Date / Time:  
 
CT Output:  
 
Ventilator: 
  
 
Oxygen Therapy: 
Oxygen Therapy O2 Sat (%): 99 % (20 1110) Pulse via Oximetry: 78 beats per minute (20 1957) O2 Device: Room air (20 1110) O2 Flow Rate (L/min): 2 l/min (20 2355) CXR: 
 
Admission Weight: Last Weight Weight: 185 lb 10 oz (84.2 kg) Weight: 177 lb 14.6 oz (80.7 kg) Intake / Output / Drain: 
Current Shift:  07 -  1900 In: 480 [P.O.:480] Out: 425 [Urine:425] Last 24 hrs.:  
 
Intake/Output Summary (Last 24 hours) at 2020 1515 Last data filed at 2020 1235 Gross per 24 hour Intake 1080 ml Output 1450 ml Net -370 ml No results for input(s): CPK, CKMB, TROIQ in the last 72 hours. Recent Labs  
  20 
0354 20 
0300 20 
9410 * 136 138  
K 4.1 4.2 4.3  
CO2 24 22 22 BUN 18 17 18 CREA 1.18 1.15 1.24  
GLU 96 92 94 MG 2.0 2.0 2.0 WBC 3.8* 4.3 4.2 HGB 8.8* 8.4* 8.3* HCT 28.1* 27.2* 26.5*  
 159 161 Recent Labs  
  20 8391 07/07/20 
0300 07/06/20 
0788 INR 1.1 1.2* 1.2* PTP 11.7* 11.9* 12.0* No lab exists for component: PBNP Current Facility-Administered Medications:  
  budesonide (PULMICORT) 500 mcg/2 ml nebulizer suspension, 500 mcg, Nebulization, QPM, Ruth Pickett MD, 500 mcg at 07/07/20 2022   fluticasone propionate (FLONASE) 50 mcg/actuation nasal spray 2 Spray, 2 Spray, Both Nostrils, DAILY, Levi, Shana B, NP, 2 Spray at 07/08/20 0700 
  ondansetron (ZOFRAN) injection 4 mg, 4 mg, IntraVENous, Q4H PRN, Levi, Shana B, NP 
  albuterol (PROVENTIL VENTOLIN) nebulizer solution 2.5 mg, 2.5 mg, Nebulization, Q4H PRN, Levi, Shana B, NP 
  docusate sodium (COLACE) capsule 100 mg, 100 mg, Oral, BID, Levi, Shana B, NP, 100 mg at 07/08/20 6919   hydrALAZINE (APRESOLINE) 20 mg/mL injection 10 mg, 10 mg, IntraVENous, Q4H PRN, Levi, Shana B, NP 
  hydrALAZINE (APRESOLINE) 20 mg/mL injection 20 mg, 20 mg, IntraVENous, Q4H PRN, Levi, Shana B, NP 
  0.9% sodium chloride infusion 250 mL, 250 mL, IntraVENous, PRN, Levi, Shana B, NP 
  0.9% sodium chloride infusion 250 mL, 250 mL, IntraVENous, PRN, Rin Degroot PA-C 
  sodium chloride (NS) flush 5-40 mL, 5-40 mL, IntraVENous, Q8H, Rupa Crane MD, 10 mL at 07/08/20 6942   sodium chloride (NS) flush 5-40 mL, 5-40 mL, IntraVENous, PRN, Rupa Crane MD, 10 mL at 07/03/20 1035   tamsulosin (FLOMAX) capsule 0.8 mg, 0.8 mg, Oral, QHS, Rupa Crane MD, 0.8 mg at 07/07/20 2215 
  venlafaxine-SR (EFFEXOR-XR) capsule 150 mg, 150 mg, Oral, DAILY WITH Yamil Merchant, Rupa Crane MD, 150 mg at 07/08/20 1859   levETIRAcetam (KEPPRA) tablet 250 mg, 250 mg, Oral, BID, Rupa Crane MD, 250 mg at 07/08/20 0479   finasteride (PROSCAR) tablet 5 mg, 5 mg, Oral, DAILY, Rupa Crane MD, 5 mg at 07/07/20 2214   digoxin (LANOXIN) tablet 0.125 mg, 0.125 mg, Oral, EVERY OTHER DAY, James Garcia MD, 0.125 mg at 07/08/20 4812   ciprofloxacin HCl (CIPRO) tablet 750 mg, 750 mg, Oral, Q12H, James Garcia MD, 750 mg at 07/08/20 1223 A/P Hematuria - urology following  
S/P LVAD - good flows Need for anticoagulation - heparin / coumadin when appropriate  
HTN - home meds  
  
Risk of morbidity and mortality - high Medical decision making - high complexity Signed By: Ceasar Puckett MD

## 2020-07-08 NOTE — PROGRESS NOTES
Urology Progress Note Patient: Ana Walters MRN: [id number]  SSN: [id number] YOB: 1950  Age: 71 y.o. Sex: male ADMITTED: 2020 to Noel Mcdaniel MD for Hematuria [R31.9] Anemia [D64.9] F/U re hematuria. Lizama removed yesterday. Voiding without difficulty. Urine is clear. Vitals: Temp (24hrs), Av.2 °F (36.8 °C), Min:97.7 °F (36.5 °C), Max:98.5 °F (36.9 °C) Pulse 81, temperature 98.2 °F (36.8 °C), resp. rate 18, height 6' 2\" (1.88 m), weight 80.7 kg (177 lb 14.6 oz), SpO2 100 %. Intake and Output: 
 1901 -  0700 In: 1845 [P.O.:1560] Out: 6450 [[facility]:5247] Labs: 
Labs:  
Lab Results Component Value Date/Time WBC 3.8 (L) 2020 03:54 AM  
 HGB 8.8 (L) 2020 03:54 AM  
 Creatinine 1.18 2020 03:54 AM  
 
 
 
Assessment/Plan: 
 Hematuria has resolved. Continue to monitor with resuming Coumadin. F/U with Dr William Wilson as scheduled. Signed By: Kiko Tanner MD - 2020

## 2020-07-08 NOTE — PROGRESS NOTES
Transitions of Care Plan: 
            RUR: 23% Anemia; LVAD; open with [facility] Baseline: Resides at home with his wife; home health; LVAD Disposition: Discharge home today with resumption of home health - SN and PT 
 
CM notified by [facility] of anticipated discharge this afternoon. Spoke with patient - confirmed open with [facility] for home health skilled nursing and physical therapy. Medicare pt has received, reviewed, and signed 2nd IM letter informing them of their right to appeal the discharge. Signed copied has been placed on pt bedside chart. Updated [facility] NP of RAYSHAWN orders for SN and PT. Sent referral to [facility] via [facility] for Resumption of Care. 1335 - Received confirmation of acceptance to resume services from [facility].  
 
Kaur Woodard, MPH

## 2020-07-08 NOTE — PROGRESS NOTES
Problem: Falls - Risk of 
Goal: *Absence of Falls Description: Document Shauna Spicer Fall Risk and appropriate interventions in the flowsheet. Outcome: Progressing Towards Goal 
Note: Fall Risk Interventions: 
Mobility Interventions: Patient to call before getting OOB, Utilize walker, cane, or other assistive device Medication Interventions: Teach patient to arise slowly, Patient to call before getting OOB Elimination Interventions: Call light in reach, Patient to call for help with toileting needs, Urinal in reach Problem: Anemia Care Plan (Adult and Pediatric) Goal: *Labs within defined limits Outcome: Progressing Towards Goal 
Goal: *Tolerates increased activity Outcome: Progressing Towards Goal 
  
Problem: Pressure Injury - Risk of 
Goal: *Prevention of pressure injury Description: Document Mich Scale and appropriate interventions in the flowsheet. Outcome: Progressing Towards Goal 
Note: Pressure Injury Interventions: Activity Interventions: Increase time out of bed, Pressure redistribution bed/mattress(bed type) Mobility Interventions: HOB 30 degrees or less, Pressure redistribution bed/mattress (bed type) Nutrition Interventions: Document food/fluid/supplement intake Problem: Heart Failure: Day 5 Goal: Medications Outcome: Progressing Towards Goal 
Goal: Respiratory Outcome: Progressing Towards Goal 
Goal: Psychosocial 
Outcome: Progressing Towards Goal 
  
0730: Bedside and Verbal shift change report given to Patt Peralta (oncoming nurse) by Gian Thorpe (offgoing nurse). Report included the following information SBAR, Kardex, ED Summary, OR Summary, Procedure Summary, Intake/Output, MAR, Accordion, Recent Results, Med Rec Status and Cardiac Rhythm NSR w/ PVCs.

## 2020-07-08 NOTE — PROGRESS NOTES
NUTRITION Pt well known from past admit during LVAD placement. Challenges with PO intake at that time but very happy to hear pt has been doing well with appetite at home with relatively stable wt around 182-185#. Pt with no questions or concerns at this time. Will rescreen per protocol. Augusta Jimenez, RD [address] , Pager #[phone] or via [employer]

## 2020-07-13 NOTE — PATIENT INSTRUCTIONS
Medication changes:    None today       Testing Ordered:    Lab work in office, will call with results     Other Recommendations:      Ensure your drinking an adequate amount of water with a goal of 6-8 eight ounce glasses (1.5-2 liters) of fluid daily. Your urine should be clear and light yellow straw colored. If your blood pressure begins to consistently run below 90/60 and/or you begin to experience dizziness or lightheadedness, please contact the [facility] at [phone]. Follow up in 2 weeks with [facility]      Please monitor your blood pressures daily prior to medications and 2 hours after taking medications. Bring a written record of your blood pressures to your next appointment. Please monitor your weights daily upon waking and after using the bathroom. Keep a written records of your weights and bring to your next appointment. If you have a weight gain of 3 or more pounds overnight OR 5 or more pounds in one week please contact our office. Thank you for allowing us the privilege of being a part of your healthcare team! Please do not hesitate to contact our office at [phone] with any questions or concerns.

## 2020-07-13 NOTE — PROGRESS NOTES
[address] [place] [address] Clinic Note      Patient name: Kiko Rantoul  Patient : 1950  Patient MRN: [id number]  Date of service: 20    CHIEF COMPLAINT:  Chief Complaint   Patient presents with    Follow-up     LVAD    Fatigue    Dizziness       HPI: Sona Kiser is a 71y.o. year old white male with a history of HTN, HLD, JOSE, CAD s/p cardiac arrest VFib s/p CABG () c/b sternal would infection and sternectomy, ischemic cardiomyopathy LVEF 15-20%, s/p BiVICD  who underwent LVAD as DT on 2019 after bridging with Impella 5.0.  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 He was transferred to [facility] rehab on  and discharged from rehab on 2020. Julia Joyner was seen  for an LVAD follow up visit. Due to fever, chills, and hypotension, he was referred to the ED, evaluated and admitted to [facility]. He underwent treatment for recurrent pseudomonas bacteremia due to UTI. His Cipro was resumed and he was discharged home in stable condition. He presents today for hospital follow up- he was admitted to [facility] for significant hematuria due to acute cystitis, his anticoagulation was held and not resumed. He continues to complain of fatigue, dizziness and dyspnea. CardioMEMS reading today 13. He denies CP, palpitations, nausea, vomiting, abdominal distention, bowel or bladder changes.     Assessment/Plan:   NYHA Class III    ICM - Stage D,  LVEF 15%, NYHA Class IV improved to NYHA Class II s/p HM3 LVAD as DT on 19 with Impella 5.0 as bridge to LVAD   Continue LVAD speed at 5800 rpm    TTE  shows large LVIDd, 6.84 cm, RVIDd 3.06 cm, TAPSE 1.15 cm, severely elevated CVP   TTE 20- Mild AI, LViDd 6.91cm, RVIDd 5.16cm, TAPSE 1.39cm   Continue digoxin 0.125 mg PO daily to support RV through infection Intolerant to BB d/t RV failure   No Entresto due to persistent orthostasis despite increased fluid intake    Intolerant to spironolactone due to IVVD and hyponatremia   Monitor CardioMEMs readings, 13 on 7/13   Follow up in 2 weeks with Dr. Denis Luna     HTN, goal MAP 70-90 mmHg   Discontinue Entresto due to orthostasis    MAPs on LUE only d/t previous right axillary Impella cannulation     H/o recurrent pseudomonal infection   Repeat Urine CX negative   Continue cipro indefinitely    Chronic anticoagulation   Off AC for now    MSSA drive line infection   Repeat culture from 5/7/20- MSSA   Augmentin stopped while admitted   Gentamicin to exit site with daily dressing changes   CT x 3 reviewed by Dr. Nilam Oneal - small fluid collection adjacent to previous real drain tract has resolved   Continue daily dressing changes   Trend CTs     VT - s/p [facility] BiVICD   AICD shock 5/16- sent to EP   Follow up with EP    COPD   Albuterol PRN    JOSE   Encouraged patient to be compliant with CPAP therapy   Follow up with Dr. Nathaly Jaimes increased protein intake    Stop multivitamin due to adequate PO intake    Tremors   Resolved   Continue keppra 250 mg BID     BPH   On finasteride and tamsulosin   Cont Cialis per urology- will discuss BP affects     Depression   On Effexor -  mg daily     Persistently elevated CEA   Persistently elevated CEA; PET scan shows increased activity RML of lung   Oncology consult appreciated - PET not suggestive of malignancy     Orthostatic Hypotension   Persistent    Discontinue Entresto    Encourage use of CAROL hose     PAF   Digoxin 0.125 mg PO daily   Intolerant of BB d/t RV failure   On warfarin    Debility   Improving   Continue PT/OT- 2x/week at home        CARDIAC IMAGING:  Chest CTA- no outflow graft occlusion   Pet Scan equivocal for amyloid    07/02/20   ECHO ADULT COMPLETE 07/06/2020 7/6/2020    Narrative · Normal wall thickness. Severely dilated left ventricle. Estimated left   ventricular ejection fraction is <15%. Visually measured ejection   fraction. No regional wall motion abnormality noted. · Moderately dilated left atrium. · Moderately dilated right ventricle. Mildly reduced systolic function. · Mildly dilated right atrium. · Mild aortic valve regurgitation is present. it does not appear to open   in systole  · Mild mitral valve regurgitation is present. Signed by: Rao Perry MD         04/20/20   ECHO ADULT COMPLETE 04/21/2020 4/21/2020    Narrative · Image quality for this study was technically difficult. · Normal wall thickness. Severely dilated left ventricle. Severe systolic   function. Estimated left ventricular ejection fraction is <15%. LVAD   present. 5800 rpm  · Dilated left atrium. · RV Not well visualized. Mildly reduced systolic function. · Mild aortic valve regurgitation is present. Aortic valve does not open   in systole. · Mild mitral valve regurgitation is present. · Mild to moderate tricuspid valve regurgitation is present. · Severely elevated central venous pressure (15+ mmHg); IVC diameter is   larger than 21 mm and collapses less than 50% with respiration. Signed by: Suze Graham MD          OTHER IMAGING:  Head CT negative for acute process  EEG suggestive of mild generalized encephalopathic process, possibly related to underlying structural brain injury vs metabolic abnormality    Review of Systems   Constitutional: Positive for malaise/fatigue. Negative for chills, fever and weight loss. Respiratory: Negative for cough, sputum production and shortness of breath. Cardiovascular: Negative for chest pain, palpitations, leg swelling and PND.        + AICD shock    Gastrointestinal: Negative for blood in stool, heartburn, nausea and vomiting. Genitourinary: Positive for frequency. Negative for hematuria. Musculoskeletal: Negative. Skin: Positive for itching. Negative for rash.         Around drive line site    Neurological: Positive for weakness. Negative for dizziness and headaches. Endo/Heme/Allergies: Does not bruise/bleed easily. Psychiatric/Behavioral: Negative. Visit Vitals  BP (!) 68/0 (BP 1 Location: Right arm, BP Patient Position: Sitting)   Pulse 89   Temp 97.6 °F (36.4 °C) (Oral)   Resp 20   Ht 6' 2\" (1.88 m)   Wt 179 lb (81.2 kg)   SpO2 98%   BMI 22.98 kg/m²     LVAD   Pump Speed (RPM): 5800  Pump Flow (LPM): 4.8  PI (Pulsitility Index): 3.7  Power: 4.4     Results for orders placed or performed in visit on 07/13/20   OK INTERROGATION VAD IN [facility] W/PHYS/QHP ANALYSIS    Narrative    Alarm history reviewed. Please see VAD flow sheet for readings. No PI events or adverse alarms noted. Settings reviewed, but no changes made. Physical Exam   Constitutional: He is oriented to person, place, and time. He appears well-developed and well-nourished. No distress. Eyes: Pupils are equal, round, and reactive to light. Neck: Normal range of motion. No JVD present. Cardiovascular: Normal rate, regular rhythm and normal heart sounds. No murmur heard.  + VAD hum   Pulmonary/Chest: Effort normal and breath sounds normal. No respiratory distress. Abdominal: Soft. Bowel sounds are normal. He exhibits no distension. Musculoskeletal: Normal range of motion. General: No edema. Comments: +1BLE- improved    Neurological: He is alert and oriented to person, place, and time. Skin: Skin is warm and dry. Drive line site- + erythema- unchanged   Psychiatric: He has a normal mood and affect. Vitals reviewed. Results for orders placed or performed in visit on 07/13/20   OK INTERROGATION VAD IN [facility] W/PHYS/QHP ANALYSIS    Narrative    Alarm history reviewed. Please see VAD flow sheet for readings. No PI events or adverse alarms noted. Settings reviewed, but no changes made.          PAST MEDICAL HISTORY:  Past Medical History:   Diagnosis Date    Degenerative disc disease, lumbar     Heart failure ([facility]     High cholesterol     Hypertension     Paroxysmal atrial fibrillation ([facility] 2019    Spinal stenosis        PAST SURGICAL HISTORY:  Past Surgical History:   Procedure Laterality Date    COLONOSCOPY Left 2019    COLONOSCOPY at bedside performed by Jaja Crabtree [facility] at [facility] ENDOSCOPY    HX APPENDECTOMY      HX CORONARY ARTERY BYPASS GRAFT      triple    HX HEART ASSIST DEVICE Left 10/29/2019    Impella 5.0    HX HEART ASSIST DEVICE Left 2019    HeartMate 3    HX HERNIA REPAIR      HX IMPLANTABLE CARDIOVERTER DEFIBRILLATOR      HX THROMBECTOMY Left 2019    Left brachial thrombectomy    CT CARDIOVERSION ELECTIVE ARRHYTHMIA EXTERNAL N/A 6/10/2019    EP CARDIOVERSION performed by Nannette Noble MD at [place] [address], [facility] CATH LAB    CT CARDIOVERSION ELECTIVE ARRHYTHMIA EXTERNAL N/A 2019    EP CARDIOVERSION performed by Joshua Carpio MD at [place] [address], [facility] [address] CATH LAB    CT INSJ/RPLCMT PERM DFB W/TRNSVNS LDS 1/DUAL CHMBR N/A 2019    INSERT ICD BIV MULTI performed by Jean Catalan MD at [place] [address], [facility] CATH LAB    CT TCAT IMPL WRLS P-ART PRS SNR L-T HEMODYN MNTR N/A 2019    IMPLANT HEART FAILURE MONITORING DEVICE performed by Nataliya Zapien MD at [place] [address], [facility] CATH LAB       FAMILY HISTORY:  Family History   Problem Relation Age of Onset    Lung Disease Mother     Hypertension Mother     Arthritis-osteo Mother     Heart Disease Mother     Heart Disease Father     Diabetes Father        SOCIAL HISTORY:  Social History     Socioeconomic History    Marital status:      Spouse name: Not on file    Number of children: Not on file    Years of education: Not on file    Highest education level: Not on file   Tobacco Use    Smoking status: Former Smoker     Last attempt to quit: 2010     Years since quittin.6    Smokeless tobacco: Never Used   Substance and Sexual Activity    Alcohol use: Not Currently     Comment: rarely    Drug use: Never   Other Topics Concern       ALLERGY:  Allergies   Allergen Reactions    Cefepime Other (comments)     myoclonus        CURRENT MEDICATIONS:    Current Outpatient Medications:     budesonide (PULMICORT) 0.5 mg/2 mL nbsp, 500 mcg by Nebulization route nightly., Disp: , Rfl:     tadalafiL (CIALIS) 2.5 mg tablet, Take 2.5 mg by mouth nightly., Disp: , Rfl:     digoxin (LANOXIN) 0.125 mg tablet, Take 1 Tab by mouth every other day., Disp: 90 Tab, Rfl: 2    magnesium oxide (MAG-OX) 400 mg tablet, Take 2 Tabs by mouth two (2) times a day., Disp: 360 Tab, Rfl: 3    ciprofloxacin HCl (CIPRO) 750 mg tablet, Take 1 Tab by mouth two (2) times a day., Disp: 60 Tab, Rfl: 3    tamsulosin (Flomax) 0.4 mg capsule, Take 0.8 mg by mouth nightly., Disp: , Rfl:     gentamicin (GARAMYCIN) 0.1 % topical ointment, Apply to exit site daily. , Disp: 30 g, Rfl: 0    venlafaxine-SR (EFFEXOR-XR) 150 mg capsule, Take 1 Cap by mouth daily (with breakfast). , Disp: 90 Cap, Rfl: 3    levETIRAcetam (KEPPRA) 250 mg tablet, Take 1 Tab by mouth two (2) times a day., Disp: 180 Tab, Rfl: 3    L. acidoph & paracasei- S therm- Bifido (TIAN-Q/RISAQUAD) 8 billion cell cap cap, Take 1 Cap by mouth daily. , Disp: 90 Cap, Rfl: 3    finasteride (PROSCAR) 5 mg tablet, Take 1 Tab by mouth daily. , Disp: 90 Tab, Rfl: 3    albuterol (PROVENTIL HFA, VENTOLIN HFA, PROAIR HFA) 90 mcg/actuation inhaler, Take 2 Puffs by inhalation every six (6) hours as needed for Wheezing., Disp: 1 Inhaler, Rfl: 3      Thank you for letting us see him with you,    TIERRA Chatterjee [place]  [address]  Office [phone]  Fax [phone]

## 2020-07-14 NOTE — PROGRESS NOTES
Problem: Mobility Impaired (Adult and Pediatric) Goal: *Acute Goals and Plan of Care (Insert Text) Description FUNCTIONAL STATUS PRIOR TO ADMISSION: Patient was modified independent using a single point cane for functional mobility. Patient reports an increasingly sedentary lifestyle 2* fatigue and SOB/dyspnea. Retired (so is his wife). Patient reports x 3 falls within the last couple of weeks. Patient is wearing either nasal cannula or CPAP at night. LVAD work-up has been initiated. HOME SUPPORT PRIOR TO ADMISSION: The patient lived with his wife, but did not require physical assistance. Physical Therapy Goals: 
 
Reassessed 1/17/2020 and goals upgraded as approporiate Goals upgraded as appropriate 1/07/2020- Goals appropriate 1/9/2020 on weekly reassessment 1. Patient will move from supine to sit and sit to supine, scoot up and down and roll side to side in bed with supervision within 7 days. 2.  Patient will perform sit to/from stand with minimal assistance x 1 within 7 days. - Upgrade to contact guard assist 1/17/2020 3. Patient will ambulate 50 feet with least restrictive assistive device and moderate assistance within 7 days. 4.  Stair goal to be formulated when appropriate. 5.  Patient will perform cardiac exercises per protocol with supervision within 7 days. 6.  Patient will verbally and functionally recall mindful movement principles (Move in the Tube) with minimal verbal cuing/reminding within 7 days. 7.  Patient will perform power exchange for power module to/from battery with minimal assistance within 7 days. - Upgrade to verbal cuing only 1/17/2020 Reassessed on 1/2/2020 and goals not met, carry-over. Reassessed on 12/26/2019, goals not met but remain appropriate, so carry over Weekly reassessment completed 12/19/2019 and goals downgraded as appropriate.   
1.  Patient will move from supine to sit and sit to supine, scoot up and Sending letter to patient down and roll side to side in bed with contact guard assistance within 7 days. 2.  Patient will perform sit to/from stand with minimal assistance x 1 within 7 days. 3.  Patient will ambulate 25 feet with least restrictive assistive device and moderate assistance within 7 days. 4.  Stair goal to be formulated when appropriate. 5.  Patient will perform cardiac exercises per protocol with supervision within 7 days. 6.  Patient will verbally and functionally recall mindful movement principles (Move in the Tube) with minimal verbal cuing/reminding within 7 days. 7.  Patient will perform power exchange for power module to/from battery with moderate assistance within 7 days. Re-evaluation completed 11/20/2019 and new goals formulated following LVAD implantation. Reviewed and cont 12/3/2019. Reviewed and continued 12/12/2019 1. Patient will move from supine to sit and sit to supine, scoot up and down and roll side to side in bed with minimal assistance/contact guard assist within 7 days. 2.  Patient will perform sit to/from stand with minimal assistance/contact guard assist within 7 days. 3.  Patient will ambulate 150 feet with least restrictive assistive device and minimal assistance/contact guard assist within 7 days. 4.  Patient will ascend/descend 4 stairs with handrail(s) with minimal assistance/contact guard assist within 7 days. 5.  Patient will perform cardiac exercises per protocol with supervision within 7 days. 6.  Patient will verbally and functionally recall 3/3 sternal precautions within 7 days. 7.  Patient will perform power exchange for power module to/from battery with moderate assistance  within 7 days. Initiated 10/27/2019; updated 11/15/2019 1. Patient will move from supine to sit and sit to supine, scoot up and down and roll side to side in bed with independence within 7 days. 2.  Patient will perform sit to/from stand with supervision/set-up within 7 days. 3.  Patient will ambulate 200 feet with least restrictive assistive device and supervision/set-up within 7 days. 4.  Patient will ascend/descend 4 stairs with  handrail(s) with supervision/set-up within 7 days for functional strengthening and community reintegration. Nat White 5.  Patient will verbally and functionally recall 3/3 sternal precautions within 7 days in preparation for LVAD implantation. 6.  Patient will perform a mock power exchange for power module to/from battery with supervision/set-up within 7 days in preparation for LVAD implantation. 1.  Patient will move from supine to sit and sit to supine, scoot up and down and roll side to side in bed with independence within 7 days. 2.  Patient will perform sit to/from stand with supervision/set-up within 7 days. 3.  Patient will ambulate 150 feet with least restrictive assistive device and supervision/set-up within 7 days. 4.  Patient will ascend/descend 4 stairs with  handrail(s) with supervision/set-up within 7 days for functional strengthening and community reintegration. Nat White 5.  Patient will verbally and functionally recall 3/3 sternal precautions within 7 days in preparation for LVAD implantation. 6.  Patient will perform a mock power exchange for power module to/from battery with supervision/set-up within 7 days in preparation for LVAD implantation. Outcome: Progressing Towards Goal 
 
PHYSICAL THERAPY TREATMENT Patient: Lonnell Schwab (75 y.o. male) Date: 1/22/2020 Diagnosis: [facility]ens[facility] [I50.9] <principal problem not specified> Procedure(s) (LRB): 
RIGHT HEART CATH (N/A) 1 Day Post-Op Precautions: Fall Chart, physical therapy assessment, plan of care and goals were reviewed. ASSESSMENT Patient continues with skilled PT services and is progressing towards goals. Pt eager to mobilize. Pt continues to report not feeling well with positional changes. Supine MAP 80 Sitting MAP 70 
 Sitting post 10 min MAP 60 Current Level of Function Impacting Discharge (mobility/balance): Other factors to consider for discharge: limited by decrease MAP PLAN : 
Patient continues to benefit from skilled intervention to address the above impairments. Continue treatment per established plan of care. to address goals. Recommendation for discharge: (in order for the patient to meet his/her long term goals) Therapy 3 hours per day 5-7 days per week This discharge recommendation: 
Has not yet been discussed the attending provider and/or case management IF patient discharges home will need the following DME: to be determined (TBD) SUBJECTIVE:  
Patient stated Can I get over.  OBJECTIVE DATA SUMMARY:  
Critical Behavior: 
Neurologic State: Alert Orientation Level: Oriented X4 Cognition: Appropriate for age attention/concentration Safety/Judgement: Decreased insight into deficits, Decreased awareness of need for safety, Decreased awareness of need for assistance Functional Mobility Training: 
Bed Mobility: 
  
Supine to Sit: Contact guard assistance Sit to Supine: Contact guard assistance Transfers: 
  
  
     
  
     
  
  
  
  
Balance: 
Sitting: Impaired Sitting - Static: Fair (occasional) Sitting - Dynamic: Poor (constant support) Ambulation/Gait Training: 
  
  
  
  
  
  
  
  
  
  
  
  
  
  
  
  
  
 
Stairs: Therapeutic Exercises:  
Seated ankle pumps, LAQ, marches, shoulder flexion, shoulder shrugs Pain Rating: No complaints Activity Tolerance:  
signs and symptoms of orthostatic hypotension Please refer to the flowsheet for vital signs taken during this treatment. After treatment patient left in no apparent distress:  
Supine in bed and Call bell within reach COMMUNICATION/COLLABORATION:  
The patients plan of care was discussed with: Registered Nurse Jose Roberto Cortes PTA Time Calculation: 27 mins

## 2020-07-17 PROBLEM — G93.40 ACUTE ENCEPHALOPATHY: Status: ACTIVE | Noted: 2020-01-01

## 2020-07-17 NOTE — TELEPHONE ENCOUNTER
Patient's wife contacted clinic and stated she is concerned patient may be having a \"stroke or mini strokes. \" She reported mental status changes. Patient has been walking around talking to himself and has \"completely changed. \" She stated changes were recently made to patient's coumadin and aspirin. Patient's wife was advised to call rescue squad for transport to [facility]. She verbalized understanding and had no further questions. Marlee Iqbal RN.

## 2020-07-17 NOTE — ED PROVIDER NOTES
Patient presents to the emergency department at the request of his wife's for a \"complete change in my mental status\". Patient reports that he has had multiple medical problems over the last year with frequent infections, LVAD placement, etc.  He reports that he had been negotiating with God and having difficulty accepting his challenges but yesterday decided to change his attitude and his approach, become more patient with his family and God, and stop making empty promises and bargains. When asked if he had an epiphany last night he reports that he may have had one. Patient reports that he is feeling well and has no medical complaints. He has had no fevers, chills, increased pain. He has no weakness, numbness, chills. The history is provided by the patient.         Past Medical History:   Diagnosis Date    Degenerative disc disease, lumbar     Heart failure ([address]     High cholesterol     Hypertension     Paroxysmal atrial fibrillation ([facility] 4/2/2019    Spinal stenosis        Past Surgical History:   Procedure Laterality Date    COLONOSCOPY Left 11/11/2019    COLONOSCOPY at bedside performed by Shelley Robertson MD at [address] HX APPENDECTOMY      [address] GRAFT      triple    HX HEART ASSIST DEVICE Left 10/29/2019    Impella 5.0    HX HEART ASSIST DEVICE Left 11/18/2019    HeartMate 3    HX HERNIA REPAIR      HX IMPLANTABLE CARDIOVERTER DEFIBRILLATOR      HX THROMBECTOMY Left 11/25/2019    Left brachial thrombectomy    UT CARDIOVERSION ELECTIVE ARRHYTHMIA EXTERNAL N/A 6/10/2019    EP CARDIOVERSION performed by [place] Sparks [place] [address] CATH LAB    UT CARDIOVERSION ELECTIVE ARRHYTHMIA EXTERNAL N/A 6/18/2019    [facility] [address] [facility]    UT INSJ/RPLCMT PERM DFB W/TRNSVNS LDS 1/DUAL CHMBR N/A 6/21/2019    INSERT ICD BIV MULTI performed by Victor M Hawthorne MD at [place] [address] CATH LAB    UT TCAT IMPL WRLS P-ART PRS SNR L-T HEMODYN MNTR N/A 2019    IMPLANT HEART FAILURE MONITORING DEVICE performed by Mariia Moss MD at [address] [facility] LAB         Family History:   Problem Relation Age of Onset    Lung Disease Mother     Hypertension Mother     Arthritis-osteo Mother     Heart Disease Mother     Heart Disease Father     Diabetes Father        Social History     Socioeconomic History    Marital status:      Spouse name: Not on file    Number of children: Not on file    Years of education: Not on file    Highest education level: Not on file   Occupational History    Not on file   Social Needs    Financial resource strain: Not on file    Food insecurity     Worry: Not on file     Inability: Not on file    Transportation needs     Medical: Not on file     Non-medical: Not on file   Tobacco Use    Smoking status: Former Smoker     Last attempt to quit: 2010     Years since quittin.6    Smokeless tobacco: Never Used   Substance and Sexual Activity    Alcohol use: Not Currently     Comment: rarely    Drug use: Never    Sexual activity: Not on file   Lifestyle    Physical activity     Days per week: Not on file     Minutes per session: Not on file    Stress: Not on file   Relationships    Social connections     Talks on phone: Not on file     Gets together: Not on file     Attends Yazdanism service: Not on file     Active member of club or organization: Not on file     Attends meetings of clubs or organizations: Not on file     Relationship status: Not on file    Intimate partner violence     Fear of current or ex partner: Not on file     Emotionally abused: Not on file     Physically abused: Not on file     Forced sexual activity: Not on file   Other Topics Concern   [address] Service Not Asked    Blood Transfusions Not Asked    Caffeine Concern Not Asked    Occupational Exposure Not Asked   [employer] Hazards Not Asked    Sleep Concern Not Asked    Stress Concern Not Asked  Weight Concern Not Asked    Special Diet Not Asked    Back Care Not Asked    Exercise Not Asked    Bike Helmet Not Asked   [address] Not Asked    Self-Exams Not Asked   Social History Narrative    Not on file         ALLERGIES: Cefepime    Review of Systems   Constitutional: Negative for chills and fever. Respiratory: Negative for shortness of breath. Cardiovascular: Negative for chest pain. Gastrointestinal: Negative for abdominal pain, constipation, diarrhea and vomiting. Neurological: Negative for dizziness and light-headedness. All other systems reviewed and are negative. Vitals:    07/17/20 1110 07/17/20 1333 07/17/20 1500   BP:  100/76 109/87   Pulse: 86  100   Resp: 16  26   Temp: 98.8 °F (37.1 °C)     SpO2: 98%  (!) 80%            Physical Exam  Vitals signs and nursing note reviewed. Constitutional:       Appearance: He is well-developed. HENT:      Head: Normocephalic and atraumatic. Eyes:      General: No scleral icterus. Neck:      Musculoskeletal: Normal range of motion. Cardiovascular:      Rate and Rhythm: Normal rate. Pulmonary:      Effort: Pulmonary effort is normal.   Abdominal:      General: There is no distension. Skin:     Findings: No erythema or rash. Neurological:      Mental Status: He is alert and oriented to person, place, and time. Cranial Nerves: Dysarthria (mild) present. No facial asymmetry. Sensory: No sensory deficit. Motor: No weakness. Gait: Gait normal.   Psychiatric:         Mood and Affect: Mood normal.         Behavior: Behavior normal.          MDM         Procedures      Patient is being admitted to the hospital.  The results of their tests and reasons for their admission have been discussed with them and/or available family. They convey agreement and understanding for the need to be admitted and for their admission diagnosis.   Consultation will be made now with the inpatient physician for hospitalization. Hospitalist Sebas Serve for Admission  4:01 PM    ED Room Number: YC77/15  Patient Name and age:  Christin Brian 71 y.o.  male  Working Diagnosis:   1. Acute cystitis without hematuria    2.  Delirium      COVID-19 Suspicion:  no    Code Status:  Full Code  Readmission: yes  Isolation Requirements:  no  Recommended Level of Care:  med/surg  Department:[facility] Adult ED - 21   Other:  LVAD patient, on suppressive cipro

## 2020-07-17 NOTE — ED TRIAGE NOTES
Patient presents from home with complaints of \"total change of his mental status\". Per patient's wife he was taken off his blood thinners on 7/8/2020 when he was discharged from the hospital.  Per family he was taken off his blood thinners d/t blood in his urine. RN notes that patient has slurred speech.     Patient has an LVAD

## 2020-07-17 NOTE — PROGRESS NOTES
Admission Medication Reconciliation:        Spoke with wife Venus Hearn) by telephone @ [phone], unable to speak with patient face to face at this time due to general isolation precautions in the ED related to COVID-19 pandemic. Wife is an excellent historian. RX query is available at this time. Interview included questions regarding use of:  PTA medications including prescription/OTC, vitamins, supplements, inhaled, topical, injectable, otic and ophthalmic medications     Medication changes (since last review): No changes    Thank you for allowing me to participate in the care of your patient. Gia ForemanD, RN # [phone]       [facility] pharmacy benefit data reflects medications filled and processed through the patient's insurance, however   this data does NOT capture whether the medication was picked up or is currently being taken by the patient. Allergies:  Cefepime    Significant PMH/Disease States:   Past Medical History:   Diagnosis Date    Degenerative disc disease, lumbar     Heart failure ([facility] [address]     High cholesterol     Hypertension     Paroxysmal atrial fibrillation ([facility] [address] 2019    Spinal stenosis      Chief Complaint for this Admission:    Chief Complaint   Patient presents with    Altered mental status    Dysarthria     Prior to Admission Medications:   Prior to Admission Medications   Prescriptions Last Dose Informant Taking? L. acidoph & paracasei- S therm- Bifido (TIAN-Q/RISAQUAD) 8 billion cell cap cap 2020 at Unknown time  Yes   Sig: Take 1 Cap by mouth daily. albuterol (PROVENTIL HFA, VENTOLIN HFA, PROAIR HFA) 90 mcg/actuation inhaler   Yes   Sig: Take 2 Puffs by inhalation every six (6) hours as needed for Wheezing. budesonide (PULMICORT) 0.5 mg/2 mL nbsp   Yes   Si mcg by Nebulization route nightly. ciprofloxacin HCl (CIPRO) 750 mg tablet 2020 at Unknown time  Yes   Sig: Take 1 Tab by mouth two (2) times a day.    digoxin (LANOXIN) 0.125 mg tablet 7/16/2020 at Unknown time  Yes   Sig: Take 1 Tab by mouth every other day. finasteride (PROSCAR) 5 mg tablet 7/17/2020 at Unknown time  Yes   Sig: Take 1 Tab by mouth daily. gentamicin (GARAMYCIN) 0.1 % topical ointment 7/17/2020 at Unknown time  Yes   Sig: Apply to exit site daily. levETIRAcetam (KEPPRA) 250 mg tablet 7/17/2020 at Unknown time  Yes   Sig: Take 1 Tab by mouth two (2) times a day. magnesium oxide (MAG-OX) 400 mg tablet 7/17/2020 at Unknown time  Yes   Sig: Take 2 Tabs by mouth two (2) times a day. tadalafiL (CIALIS) 2.5 mg tablet 7/16/2020 at Unknown time  Yes   Sig: Take 2.5 mg by mouth nightly. tamsulosin (Flomax) 0.4 mg capsule 7/16/2020 at Unknown time  Yes   Sig: Take 0.8 mg by mouth nightly. venlafaxine-SR (EFFEXOR-XR) 150 mg capsule 7/17/2020 at Unknown time  Yes   Sig: Take 1 Cap by mouth daily (with breakfast). Facility-Administered Medications: None       Please contact the main inpatient pharmacy with any questions or concerns at [phone] and we will direct you to the clinical pharmacist covering this patient's care while in-house.    ROSIBEL Ortiz

## 2020-07-17 NOTE — PROGRESS NOTES
Brief chart review from [facility] inpatient CM in anticipation of admission. Notified [facility] NP of patient's currently emotional/depressive state. Plan for psych consult to be placed and possibly seen on Monday if admitted. LVAD implant November 2019; recent admission for hematuria. CM will follow up with patient on Monday if still at [facility].     Verena Stratton MPH

## 2020-07-17 NOTE — PROGRESS NOTES
[facility] in [place], [place]  Inpatient Progress Note      Patient name: Kiko [place]  Patient : 1950  Patient MRN: [id number]  Attending MD: Marvin Medina MD  Date of service: 20    REASON FOR CONSULT:  UTI in patient with LVAD    PLAN:  Urology consult for cystoscopy and antibiotics per hospitalist  Consider ID consult in AM for recurrent UTI  Continue current device speed at 5,800rpm  Unable to tolerate anticoagulation due to hematuria requiring transfusions  Continue current medical therapy for heart failure; limited by hypotension and side effects  Continue digoxin 0.125 mg for RV support  Cannot tolerate beta-blockers d/t RV failure  Cannot tolerate ACE/ARB/ARNi due to persistent orthostasis despite increased fluid intake   Cannot tolerate spironolactone due to IVVD and hyponatremia  Monitor CardioMEMs readings, 13 on   Gentamicin to exit site with daily dressing changes  DNR    IMPRESSION:  UTI  Metabolic encephalopathy  Chronic systolic heart failure  Stage D, NYHA class II symptoms  Coronary artery disease  Ischemic cardiomyopathy, LVEF 15%  S/p HM3 LVAD as destination therapy (19 by Dr. Tripp Moore)  S/p Impella 5.0 as bridge to LVAD  HTN, goal MAP 70-90mmHg  H/o VT s/p [facility] BIV-ICD  PAF  H/o recurrent UTI, pseudomonal infection  Unable to tolerate AC due to hematuria  MSSA drive line infection  COPD  JOSE  Essential tremor on keppra  Depression on effexor  Persistently elevated CEA PET scan increased RML activity, not malignant per hemonc  Orthostatic hypotension  DNR    CARDIAC IMAGING:  TTE  shows large LVIDd, 6.84 cm, RVIDd 3.06 cm, TAPSE 1.15 cm, severely elevated CVP  TTE 20- Mild AI, LViDd 6.91cm, RVIDd 5.16cm, TAPSE 1.39cm    HISTORY OF PRESENT ILLNESS:  Sona Kiser is a 71y.o. year old white male with a history of HTN, HLD, JOSE, CAD s/p cardiac arrest VFib s/p CABG () c/b sternal would infection and sternectomy, ischemic cardiomyopathy LVEF 15-20%, s/p BiVICD  who underwent LVAD as DT on 11/18/2019 after bridging with Impella 5.0.  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 He was transferred to [address] [place] [address] rehab on 1/30 and discharged from rehab on 2/18/2020.      Sona was seen 4/20 for an LVAD follow up visit. Due to fever, chills, and hypotension, he was referred to the ED, evaluated and admitted to [facility]. He underwent treatment for recurrent pseudomonas bacteremia due to UTI. His Cipro was resumed and he was discharged home in stable condition.      presenting to [facility] with progressive and worsening confusion. Patient seen and examined and currently tangential in thought process. Discussed with wife Ria Mckeon, via phone. Per wife, patient has been progressively confused during the past week. Reports he has not slept for approximately 3 nights. At times, patient is coherent but then intermittently confused. He has been doing things out of the ordinary such as calling his children early in the morning or late at night. Also calling his siblings multiple times. Per wife, patient is Worship but usually does not speak about God to others. During my encounter, patient continuously spoke about the Terrye [place] in his past. In the ED, labs remarkable for UA moderate leukocyte esterase, negative nitrites, 1+ bacteria, >100 RBCs, Hgb 7.9, Cr 2.09. Patient given zosyn. LVAD INTERROGATION:  Device interrogated in person  Device function normal, normal flow, no events  Flows in 4s, PI 3.7    PHYSICAL EXAM:  Visit Vitals  /79   Pulse 96   Temp 98.8 °F (37.1 °C)   Resp 20   SpO2 98%     General: Patient is well developed, well-nourished in no acute distress  HEENT: Normocephalic and atraumatic. No scleral icterus.  Pupils are equal, round and reactive to light and accomodation. No conjunctival injection. Oropharynx is clear. Neck: Supple. No evidence of thyroid enlargements or lymphadenopathy. JVD: Cannot be appreciated   Lungs: Breath sounds are equal and clear bilaterally. No wheezes, rhonchi, or rales. Heart: Regular rate and rhythm with normal S1 and S2. No murmurs, gallops or rubs. Abdomen: Soft, no mass or tenderness. No organomegaly or hernia. Bowel sounds present. Genitourinary and rectal: deferred  Extremities: No cyanosis, clubbing, or edema. Neurologic: No focal sensory or motor deficits are noted. Grossly intact. Psychiatric: Awake, alert an doriented x 3. Appropriate mood and affect. Skin: Warm, dry and well perfused. No lesions, nodules or rashes are noted. REVIEW OF SYSTEMS:  General: Denies fever, night sweats. Ear, nose and throat: Denies difficulty hearing, sinus problems, runny nose, post-nasal drip, ringing in ears, mouth sores, loose teeth, ear pain, nosebleeds, sore throate, facial pain or numbess  Cardiovascular: see above in the interval history  Respiratory: Denies cough, wheezing, sputum production, hemoptysis.   Gastrointestinal: Denies heartburn, constipation, intolerance to certain foods, diarrhea, abdominal pain, nausea, vomiting, difficulty swallowing, blood in stool  Kidney and bladder: Denies painful urination, frequent urination, urgency, prostate problems and impotence  Musculoskeletal: Denies joint pain, muscle weakness  Skin and hair: Denies change in existing skin lesions, hair loss or increase, breast changes    PAST MEDICAL HISTORY:  Past Medical History:   Diagnosis Date    Degenerative disc disease, lumbar     Heart failure ([address]     High cholesterol     Hypertension     Paroxysmal atrial fibrillation ([address] 4/2/2019    Spinal stenosis        PAST SURGICAL HISTORY:  Past Surgical History:   Procedure Laterality Date    COLONOSCOPY Left 11/11/2019    COLONOSCOPY at bedside [facility]OSCOPY    HX APPENDECTOMY      HX CORONARY ARTERY BYPASS GRAFT      triple    HX HEART ASSIST DEVICE Left 10/29/2019    Impella 5.0    HX HEART ASSIST DEVICE Left 2019    HeartMate 3    HX HERNIA REPAIR      HX IMPLANTABLE CARDIOVERTER DEFIBRILLATOR      HX THROMBECTOMY Left 2019    Left brachial thrombectomy    [place] CARDIOVERSION ELECTIVE ARRHYTHMIA EXTERNAL N/A 6/10/2019    EP CARDIOVERSION performed by Zeyad Onofre MD at [address] CATH LAB    WI CARDIOVERSION ELECTIVE ARRHYTHMIA EXTERNAL N/A 2019    EP CARDIOVERSION performed by Jens Cottrell MD at [address] CATH LAB    WI INSJ/RPLCMT PERM DFB W/TRNSVNS LDS 1/DUAL CHMBR N/A 2019    INSERT ICD BIV MULTI performed by Zeeshan Kirk MD at [address] CATH LAB    WI TCAT IMPL WRLS P-ART PRS SNR L-T HEMODYN MNTR N/A 2019    IMPLANT HEART FAILURE MONITORING DEVICE performed by Yolette Santos MD at [address] CATH LAB       FAMILY HISTORY:  Family History   Problem Relation Age of Onset    Lung Disease Mother     Hypertension Mother     Arthritis-osteo Mother     Heart Disease Mother     Heart Disease Father     Diabetes Father        SOCIAL HISTORY:  Social History     Socioeconomic History    Marital status:      Spouse name: Not on file    Number of children: Not on file    Years of education: Not on file    Highest education level: Not on file   Tobacco Use    Smoking status: Former Smoker     Last attempt to quit: 2010     Years since quittin.6    Smokeless tobacco: Never Used   Substance and Sexual Activity    Alcohol use: Not Currently     Comment: rarely    Drug use: Never   Other Topics Concern       LABORATORY RESULTS:     Labs Latest Ref Rng & Units 2020   WBC 4.1 - 11.1 K/uL 5.3 5.8 3.8(L) 4.3 4.2 3.6(L) -   RBC 4.10 - 5.70 M/uL 2.59(L) 2.60(LL) 2.85(L) 2.73(L) 2.67(L) 2.63(L) -   Hemoglobin 12.1 - 17.0 g/dL 7.9(L) 7. 9(L) 8.8(L) 8.4(L) 8. 3(L) 8.0(L) 7. 9(L)   Hematocrit 36.6 - 50.3 % 25. 1(L) 24. 7(L) 28. 1(L) 27. 2(L) 26. 5(L) 26. 2(L) 25. 3(L)   MCV 80.0 - 99.0 FL 96.9 95 98.6 99. 6(H) 99. 3(H) 99. 6(H) -   Platelets 176 - 277 K/uL 174 203 153 159 161 154 -   Lymphocytes 12 - 49 % 8(L) - - - - - -   Monocytes 5 - 13 % 9 - - - - - -   Eosinophils 0 - 7 % 1 - - - - - -   Basophils 0 - 1 % 0 - - - - - -   Albumin 3.5 - 5.0 g/dL 3. 0(L) 3. 3(L) 2. 5(L) 2. 5(L) 2. 5(L) 2. 4(L) -   Calcium 8.5 - 10.1 MG/DL 8.5 8.6 8. 1(L) 8.4(L) 8. 1(L) 7. 5(L) -   Glucose 65 - 100 mg/dL 120(H) 85 96 92 94 93 -   BUN 6 - 20 MG/DL 33(H) 24 18 17 18 18 -   Creatinine 0.70 - 1.30 MG/DL 2.09(H) 1.42(H) 1.18 1.15 1.24 1.19 -   Sodium 136 - 145 mmol/L 135(L) 135 135(L) 136 138 136 -   Potassium 3.5 - 5.1 mmol/L 4.5 4.4 4.1 4.2 4.3 4.2 -   PSA 0.01 - 4.0 ng/mL - - - - - 0.4 -   LDH 85 - 241 U/L - - 204 193 195 182 -   Some recent data might be hidden     Lab Results   Component Value Date/Time    TSH 1.70 04/21/2020 01:59 PM    TSH 2.30 12/03/2019 03:29 AM    TSH 2.40 10/25/2019 07:39 PM    TSH 2.45 06/01/2019 04:16 AM       ALLERGY:  Allergies   Allergen Reactions    Cefepime Other (comments)     myoclonus        CURRENT MEDICATIONS:    Current Facility-Administered Medications:     piperacillin-tazobactam (ZOSYN) 3.375 g in 0.9% sodium chloride (MBP/ADV) 100 mL, 3.375 g, IntraVENous, ONCE, Gill Cha MD    sodium chloride (NS) flush 5-40 mL, 5-40 mL, IntraVENous, Q8H, Hyun Zhao,     sodium chloride (NS) flush 5-40 mL, 5-40 mL, IntraVENous, PRN, Stephanie Dukes,     acetaminophen (TYLENOL) tablet 650 mg, 650 mg, Oral, Q4H PRN, Hyun Dukes,     diphenhydrAMINE (BENADRYL) injection 12.5 mg, 12.5 mg, IntraVENous, Q4H PRN, Hyun Dukes,     ondansetron [facility]) injection 4 mg, 4 mg, IntraVENous, Q4H PRN, Hyun Zhao,     bisacodyL (DULCOLAX) tablet 5 mg, 5 mg, Oral, DAILY PRN, Hyun Dukes,     digoxin [employer]) tablet 0.125 mg, 0.125 mg, Oral, EVERY OTHER DAY, Jay Jay Zhao DO    [START ON 7/18/2020] finasteride (PROSCAR) tablet 5 mg, 5 mg, Oral, DAILY, Jay Jay Zhao DO    [START ON 7/18/2020] lactobac ac& pc-s.therm-b.anim (TIAN Q/RISAQUAD), 1 Cap, Oral, DAILY, CARLOS Zhao M,     levETIRAcetam (KEPPRA) tablet 250 mg, 250 mg, Oral, BID, Hyun Zhao,     magnesium oxide (MAG-OX) tablet 800 mg, 800 mg, Oral, BID, Jay Jay Bañuelos DO    . PHARMACY TO SUBSTITUTE PER PROTOCOL (Reordered from: tadalafiL (CIALIS) 2.5 mg tablet), , , Per Protocol, CARLOS Bañuelos M,     tamsulosin (FLOMAX) capsule 0.8 mg, 0.8 mg, Oral, QHS, CARLOS Zhao,     [START ON 7/18/2020] venlafaxine-SR (EFFEXOR-XR) capsule 150 mg, 150 mg, Oral, DAILY WITH BREAKFAST, Zhao, [employer] M,     piperacillin-tazobactam (ZOSYN) 3.375 g in 0.9% sodium chloride (MBP/ADV) 100 mL, 3.375 g, IntraVENous, Q8H, Jay Jay Zhao DO    Current Outpatient Medications:     budesonide (PULMICORT) 0.5 mg/2 mL nbsp, 500 mcg by Nebulization route nightly., Disp: , Rfl:     tadalafiL (CIALIS) 2.5 mg tablet, Take 2.5 mg by mouth nightly., Disp: , Rfl:     digoxin (LANOXIN) 0.125 mg tablet, Take 1 Tab by mouth every other day., Disp: 90 Tab, Rfl: 2    magnesium oxide (MAG-OX) 400 mg tablet, Take 2 Tabs by mouth two (2) times a day., Disp: 360 Tab, Rfl: 3    ciprofloxacin HCl (CIPRO) 750 mg tablet, Take 1 Tab by mouth two (2) times a day., Disp: 60 Tab, Rfl: 3    tamsulosin (Flomax) 0.4 mg capsule, Take 0.8 mg by mouth nightly., Disp: , Rfl:     gentamicin (GARAMYCIN) 0.1 % topical ointment, Apply to exit site daily. , Disp: 30 g, Rfl: 0    venlafaxine-SR (EFFEXOR-XR) 150 mg capsule, Take 1 Cap by mouth daily (with breakfast). , Disp: 90 Cap, Rfl: 3    levETIRAcetam (KEPPRA) 250 mg tablet, Take 1 Tab by mouth two (2) times a day., Disp: 180 Tab, Rfl: 3    L. acidoph & paracasei- S therm- Bifido (TIAN-Q/RISAQUAD) 8 billion cell cap cap, Take 1 Cap by mouth daily. , Disp: 90 Cap, Rfl: 3    finasteride (PROSCAR) 5 mg tablet, Take 1 Tab by mouth daily. , Disp: 90 Tab, Rfl: 3    albuterol (PROVENTIL HFA, VENTOLIN HFA, PROAIR HFA) 90 mcg/actuation inhaler, Take 2 Puffs by inhalation every six (6) hours as needed for Wheezing., Disp: 1 Inhaler, Rfl: 3    Thank you for allowing me to participate in this patient's care.     Annemarie Mabry MD PhD  [address] [place] [address], [place] [address]  Phone: [phone]  Fax: [phone]

## 2020-07-17 NOTE — PROGRESS NOTES
Spiritual Care Assessment/Progress Note  ST. [address]      NAME: Jethro Castillo      MRN: [id number]  AGE: 71 y.o.  SEX: male  Restoration Affiliation: Scientology   Language: English     7/17/2020     Total Time (in minutes): 18     Spiritual Assessment begun in [address] through conversation with:         [x]Patient        [] Family    [] Friend(s)        Reason for Consult: Request by staff     Spiritual beliefs: (Please include comment if needed)     [x] Identifies with a jessie tradition:         [] Supported by a jessie community:            [] Claims no spiritual orientation:           [] Seeking spiritual identity:                [] Adheres to an individual form of spirituality:           [] Not able to assess:                           Identified resources for coping:      [x] Prayer                               [] Music                  [] Guided Imagery     [x] Family/friends                 [] Pet visits     [] Devotional reading                         [] Unknown     [] Other:                                              Interventions offered during this visit: (See comments for more details)    Patient Interventions: Affirmation of emotions/emotional suffering, Catharsis/review of pertinent events in supportive environment, Initial/Spiritual assessment, patient floor, Prayer (assurance of)           Plan of Care:     [x] Support spiritual and/or cultural needs    [] Support AMD and/or advance care planning process      [] Support grieving process   [] Coordinate Rites and/or Rituals    [] Coordination with community clergy   [] No spiritual needs identified at this time   [] Detailed Plan of Care below (See Comments)  [] Make referral to Music Therapy  [] Make referral to Pet Therapy     [] Make referral to Addiction services  [] Make referral to [facility]  [] Make referral to Spiritual Care Partner  [] No future visits requested        [x] Follow up visits as needed     Comments: Visited Mr Dewayne Varela in ED-11 at the request of Care Manager. Mr Dewayne Varela was lying on the stretcher; he appeared in pretty good spirits. Patient remembered this  from his previous admissions. Provided pastoral presence and active listening as Mr Dewayne Varela shared about his recent experiences. He said that he had had a good talk with Kris and he had felt much better afterwards. Patient's speech was pressured and he admitted to not having slept much in three nights. He denied having any concerns to share and continued to insist that he was feeling better and planned to have another talk with Kris ashley. Acknowledged Mr Kiser's feelings and assured him of  availability for support and prayers on his behalf. : Rev. Blane Ybarra.  Jose Juan; [facility], to contact [phone] call: [phone]

## 2020-07-17 NOTE — H&P
[address] [facility] Adult  Hospitalist Group  History and Physical    Primary Care Provider: Bekah Gallo MD  Date of Service:  7/17/2020    CC: confusion     Subjective:     71year old male with past medical history heart failure, LVEF 15%, on LVAD, paroxysmal atrial fibrillation, BPH, recent admission for hematuria, presenting to [facility] with progressive and worsening confusion. Patient seen and examined and currently tangential in thought process. Discussed with wife Marita Llanos, via phone. Per wife, patient has been progressively confused during the past week. Reports he has not slept for approximately 3 nights. At times, patient is coherent but then intermittently confused. He has been doing things out of the ordinary such as calling his children early in the morning or late at night. Also calling his siblings multiple times. Per wife, patient is Faith but usually does not speak about God to others. During my encounter, patient continuously spoke about the Corby Rosario in his past.  Patient alert to name, place, situation but not year. Denies chest pain, shortness of breath, cough, fevers, chills, sweats, urinary frequency, dysuria, abdominal pain. Endorses hematuria    In the ED, labs remarkable for UA moderate leukocyte esterase, negative nitrites, 1+ bacteria, >100 RBCs, Hgb 7.9, Cr 2.09. Patient given zosyn. Review of Systems:    A comprehensive review of systems was negative except for that written in the History of Present Illness.      Past Medical History:   Diagnosis Date    Degenerative disc disease, lumbar     Heart failure ([address]     High cholesterol     Hypertension     Paroxysmal atrial fibrillation ([address] 4/2/2019    Spinal stenosis       Past Surgical History:   Procedure Laterality Date    COLONOSCOPY Left 11/11/2019    COLONOSCOPY at bedside performed by Janessa Valdes MD at [facility] ENDOSCOPY    HX APPENDECTOMY      HX CORONARY ARTERY BYPASS GRAFT      triple    HX HEART ASSIST DEVICE Left 10/29/2019    Impella 5.0    HX HEART ASSIST DEVICE Left 11/18/2019    HeartMate 3    HX HERNIA REPAIR      HX IMPLANTABLE CARDIOVERTER DEFIBRILLATOR      HX THROMBECTOMY Left 11/25/2019    Left brachial thrombectomy    ME CARDIOVERSION ELECTIVE ARRHYTHMIA EXTERNAL N/A 6/10/2019    EP CARDIOVERSION performed by Pedro High MD at [address], [facility] CATH LAB    ME CARDIOVERSION ELECTIVE ARRHYTHMIA EXTERNAL N/A 6/18/2019    EP CARDIOVERSION performed by Neill Pallas, MD at [address] [facility] CATH LAB    ME INSJ/RPLCMT PERM DFB W/TRNSVNS LDS 1/DUAL CHMBR N/A 6/21/2019    INSERT ICD BIV MULTI performed by Kaylee Robbins MD at [address] [facility] [address], [facility] CATH LAB    ME TCAT IMPL WRLS P-ART PRS SNR L-T HEMODYN MNTR N/A 9/18/2019    IMPLANT HEART FAILURE MONITORING DEVICE performed by Ally Neal MD at [address] [address], [facility]  CATH LAB     Prior to Admission medications    Medication Sig Start Date End Date Taking? Authorizing Provider   budesonide (PULMICORT) 0.5 mg/2 mL nbsp 500 mcg by Nebulization route nightly. Yes Provider, Historical   tadalafiL (CIALIS) 2.5 mg tablet Take 2.5 mg by mouth nightly. Yes Provider, Historical   digoxin (LANOXIN) 0.125 mg tablet Take 1 Tab by mouth every other day. 5/26/20  Yes Shana Sims NP   magnesium oxide (MAG-OX) 400 mg tablet Take 2 Tabs by mouth two (2) times a day. 5/22/20  Yes Verona Sims NP   ciprofloxacin HCl (CIPRO) 750 mg tablet Take 1 Tab by mouth two (2) times a day. 5/22/20  Yes LeviShana metzger NP   tamsulosin (Flomax) 0.4 mg capsule Take 0.8 mg by mouth nightly. Yes Provider, Historical   gentamicin (GARAMYCIN) 0.1 % topical ointment Apply to exit site daily. 3/16/20  Yes Maynor Crooks NP   venlafaxine-SR [facility] 150 mg capsule Take 1 Cap by mouth daily (with breakfast). 3/9/20  Yes J Luis Herrera NP   levETIRAcetam (KEPPRA) 250 mg tablet Take 1 Tab by mouth two (2) times a day. 3/9/20  Yes TIERRA Lafleur acidoph & paracasei- S therm- Bifido (TIAN-Q/RISAQUAD) 8 billion cell cap cap Take 1 Cap by mouth daily. 3/9/20  Yes Christine Mcnamara NP   finasteride (PROSCAR) 5 mg tablet Take 1 Tab by mouth daily. 3/9/20  Yes Christine Mcnamara NP   albuterol (PROVENTIL HFA, VENTOLIN HFA, PROAIR HFA) 90 mcg/actuation inhaler Take 2 Puffs by inhalation every six (6) hours as needed for Wheezing. 2/26/20  Yes Levi, Kathy Sauce, NP     Allergies   Allergen Reactions    Cefepime Other (comments)     myoclonus      Family History   Problem Relation Age of Onset    Lung Disease Mother     Hypertension Mother     Arthritis-osteo Mother     Heart Disease Mother     Heart Disease Father     Diabetes Father         SOCIAL HISTORY:  Patient resides at Home. Patient ambulates with independence. Smoking history: Denies  Alcohol history: Denies        Objective:       Physical Exam:   Visit Vitals  /79   Pulse 96   Temp 98.8 °F (37.1 °C)   Resp 20   SpO2 98%     General appearance: alert, cooperative, no distress, appears stated age  Head: Normocephalic, without obvious abnormality, atraumatic  Back: symmetric, no curvature. ROM normal. No CVA tenderness. Lungs: clear to auscultation bilaterally  Heart: LVAD hum, click, rub or gallop  Abdomen: soft, non-tender. LVAD insertion site clean, bowel sounds normal. No masses,  no organomegaly  Extremities: extremities normal, atraumatic, no cyanosis or edema  Pulses: 2+ and symmetric  Skin: Skin color, texture, turgor normal. No rashes or lesions  Neurologic: Alert to name and place, not year, tangential in conversation, speech clear, strength 5/5  Cap refill: Brisk, less than 3 seconds  Pulses: 2+, symmetric in all extremities    Data Review: All diagnostic labs and studies have been reviewed. CT head:  IMPRESSION:   1. No acute intracranial abnormality. 2. Aerated secretions in the left maxillary sinus can be seen with acute  sinusitis.     Assessment:     Active Problems:    Acute encephalopathy (7/17/2020)        Plan:     Acute encephalopathy, suspected metabolic:  Suspected urinary tract infection:  -presenting with acute progressive confusion x1 week  -UA with + leukocyte esterase, -nitrites, 1+ bacteria  -Initiated on Zosyn, will continue  -Follow urine cultures, send blood cultures  -Pending clinical course, consider further studies such as MRI     Chronic heart failure, EF 15%, LVAD:   -consult Advanced heart failure  -continue home medications    Acute renal failure:  -monitor closely, will consult nephrology     Paroxysmal atrial fibrillation: Continue digoxin    Hematuria:   Anemia:  -Persistent, Coumadin discontinued during previous hospitalization, H/H stable    Hyponatremia: Mild, monitor    BPH: On Flomax    Seizure disorder: Home Keppra    Depression: Home venlafaxine    Code: DNR (confirmed with AMD and wife)  DVT: SCDs      Signed By: Rajni Lozano DO     July 17, 2020

## 2020-07-17 NOTE — PROGRESS NOTES
Date of previous inpatient admission/ ED visit? 7/2/20-7/8/20 Inpatient (Anemia)    What brought the patient back to ED? Patient presents to the ED w/ chief compliant of [facility]    Did patient decline recommended services during last admission/ ED visit (if yes, what)? No . [facility] is following for RN/ PT    Has patient seen a provider since their last inpatient admission/ED visit (if yes, when)? Yes. Abdifatah Mg, TIERRA ([id number]) 7/13/20    PCP: First and Last name: Munira Mendoza   Name of Practice:    Are you a current patient: Yes/No:  Yes   Approximate date of last visit: 4 months    CM Interventions:  From previous inpatient admission/ED visit:Assessment  From current inpatient admission/ED visit:Assessment    EMR reviewed. History significant for HTN, CAD, CHF, LVAD (11/2019), and CVA. Met w/patient and introduced to role of CM. Patient is alert and provided history (secondary to hoarseness this writer had to ask patient to repeat parts of conversation). Patient shared having an attitude change was reason in the ED today. Demographics verified. Patient lives in 1 [place] home. Spouse /family are supportive. Family includes 2 children and 4 grandchildren. [employer] Medicare A/B and [employer] are insurance providers. [address] [place] [address] Road are used for prescriptions. AMD is on file. Patient states spouse Estuardo Mclain is somewhere at hospital.Patient has been  for 52 years.  is retired [employer] 21 years.  states he's been talking to the Orqu[employer] Sureshk asked CM if knew song \"Have A Little Talk w/ Kris. \"Patient states he has a plan and its his choice.  expressed had changes in his head and emotions during this time. Patient endorses not sleeping for 3 days. Emotional support provided to patient. Call placed to spouse Day Matute introduced to role of CM. Additional history provided.  Patient has been calling family members (son,daughter, family in [place], sister and [id number]). Spouse states patient has periods of agitation when he is focused on something and not receptive of being corrected. Informed patient has stated \" I'm Getting My Life Straight. \" Spouse has observed progressive behavior and talking to self. CM asked about hoarseness - family has noted since 11/19. Patient per spouse does not eat much and has to push fluids. [facility] HH [facility] (choice provider). Son and grands live in [place] and are available support. Daughter lives in [facility]. DME includes RW,wheelchair, and cane. Patient has had increased falls (this week). Referral placed to Pastoral Care. Case Management will continue to follow for transitions of care needs. Care Management Interventions  PCP Verified by CM: Yes  Last Visit to PCP: 03/17/20  Palliative Care Criteria Met (RRAT>21 & CHF Dx)?: No  Transition of Care Consult (CM Consult):  Other(recent hospitalization <30 days)  MyChart Signup: Yes  Discharge Durable Medical Equipment: No  Health Maintenance Reviewed: Yes  Physical Therapy Consult: No  Occupational Therapy Consult: No  Speech Therapy Consult: No  Current Support Network: Lives with Spouse, [address] [facility] Provided?: No  Discharge Location  Discharge Placement: (TBD)

## 2020-07-18 NOTE — PROGRESS NOTES
1530: Bedside and Verbal shift change report given to [facility] (oncoming nurse) by Donte Infante RN (offgoing nurse). Report included the following information SBAR, Kardex, Intake/Output, MAR, Accordion, Recent Results and Cardiac Rhythm NSR.     1605: paged Dr. Prerna Evans due to patient being DNR and no pink sheet on chart. Per MD will come complete form later on this evening after he completes some higher priority work    [phone]: MD made aware that second set of blood cultures growing gram neg rods    1900: patient experienced 45-50 beat run of V-tach, patient asymptomatic and talking to RN during. Spoke with Dr. Devante Bowman and updated on patient condition. Telephone with read back orders received for 250 bolus NS and 12.5 G albumin     1910: patient bladder scan shows 316 ml urine in bladder, per urology order straight cath over 500 ml. Will continue to monitor    1915: patient able to void 100ml erica urine    1930: Bedside and Verbal shift change report given to Sincere Gray RN and KATRIN Dallas (oncoming nurse) by KATRIN Aguirre (offgoing nurse). Report included the following information SBAR, Kardex, Intake/Output, MAR, Accordion, Recent Results and Cardiac Rhythm NSR, paced beats. Problem: Falls - Risk of  Goal: *Absence of Falls  Description: Document Dianne Sierra Fall Risk and appropriate interventions in the flowsheet.   Outcome: Progressing Towards Goal  Note: Fall Risk Interventions:  Mobility Interventions: Communicate number of staff needed for ambulation/transfer, OT consult for ADLs, Patient to call before getting OOB, PT Consult for mobility concerns, PT Consult for assist device competence         Medication Interventions: Evaluate medications/consider consulting pharmacy, Patient to call before getting OOB, Teach patient to arise slowly                   Problem: Patient Education: Go to Patient Education Activity  Goal: Patient/Family Education  Outcome: Progressing Towards Goal     Problem: LVAD: Discharge Outcomes  Goal: *Hemodynamically stable  Outcome: Progressing Towards Goal  Goal: *Lungs clear or at baseline  Outcome: Progressing Towards Goal  Goal: *Tolerating diet  Outcome: Progressing Towards Goal  Goal: *LVAD parameters within set limits  Outcome: Progressing Towards Goal  Goal: *LVAD drive line site without signs and symptoms of wound complications  Outcome: Progressing Towards Goal

## 2020-07-18 NOTE — ED NOTES
[facility] RN, Gabriele Antunez, at bedside to place patient back to batteries. Patient transported on cardiac monitor and [facility] RN. Pt in no acute distress upon transfer.

## 2020-07-18 NOTE — CONSULTS
Nephrology Consult Note     Chandlerlarry Shleton     www. [email or web address]              Phone - [phone]   Patient: Rusty Hong   YOB: 1950    Date- 7/18/2020  MRN: [id number]             REASON FOR CONSULTATION: JUAN J  CONSULTING PHYSICIAN: DR.A. MORA    ADMIT DATE:7/17/2020 PATIENT Margret Titus MD     IMPRESSION:   JUAN J DUE TO renal hypoperfusion + pre renal factors with poor po intake due to confusion   Post renal obstruction can't be rule out    ckd 3 bl. Cr. 1.1    Iron defi anemia - iron sat 10%    UTI    Hematuria, bph, incontinence    Encephalopathy, confusion    CHF  Orthostatic hypotension  afib  Ischemic cardiomyopathy, LVEF 15%  S/p HM3 LVAD as destination therapy (11/18/19 by Dr. Gaby Talley)  S/p Impella 5.0 as bridge to LVAD       PLAN:   · Hold diuretics  · Give albumin  · Start iv iron  · Check bladder scan  · Follow bmp  · Check urine lytes       Active Problems:    Acute encephalopathy (7/17/2020)        [x] High complexity decision making was performed  [x] Patient is at high-risk of decompensation with multiple organ involvement    Subjective:   HPI: Rusty Hong is a 71 y.o.  male. He is admitted with confusion  He is found to have juan j with cr 2.0 on admission  He is not able to give me much history. I have reviewed. Current and old medical records  He has h/o chf, LVAD. He has h/o frequent UTI. He has been seen by DR. CHOUDHURY this am  He has anemia with hb 7.8 And iron sats 10 %  He has been seeing Dr Alexis Ray for recurrent UTI and BpH   No fevers/ chills/ rigors   No Nephrotoxin exposure     Review of Systems:    Can't access due to patient's current condition       Past Medical History:   Diagnosis Date    BPH (benign prostatic hyperplasia)     CHF (congestive heart failure) (HCC)     Degenerative disc disease, lumbar     [facility] failure ([facility] [address]     High cholesterol     Hypertension     Ischemic cardiomyopathy     LVAD (left ventricular assist device) present [facility] [address] [facility]     Paroxysmal atrial fibrillation ([facility] [address] 4/2/2019    Spinal stenosis       Past Surgical History:   Procedure Laterality Date    COLONOSCOPY Left 11/11/2019    COLONOSCOPY at bedside performed by Shelley Robertson MD at [address] HX APPENDECTOMY      [address] GRAFT      triple    HX HEART ASSIST DEVICE Left 10/29/2019    Impella 5.0    HX HEART ASSIST DEVICE Left 11/18/2019    HeartMate 3    HX HERNIA REPAIR      HX IMPLANTABLE CARDIOVERTER DEFIBRILLATOR      HX THROMBECTOMY Left 11/25/2019    Left brachial thrombectomy    UT CARDIOVERSION ELECTIVE ARRHYTHMIA EXTERNAL N/A 6/10/2019    EP CARDIOVERSION performed by Sonny Sparks MD at [place] [address], [facility] CATH LAB    UT CARDIOVERSION ELECTIVE ARRHYTHMIA EXTERNAL N/A 6/18/2019    EP CARDIOVERSION performed by Russell Damon MD at [place] [address], [facility] [address] CATH LAB    UT INSJ/RPLCMT PERM DFB W/TRNSVNS LDS 1/DUAL CHMBR N/A 6/21/2019    INSERT ICD BIV MULTI performed by Victor M Hawthorne MD at [place] [address], [facility] [address] CATH LAB    UT TCAT IMPL WRLS P-ART PRS SNR L-T HEMODYN MNTR N/A 9/18/2019    IMPLANT HEART FAILURE MONITORING DEVICE performed by Caryl Norman MD at [place] [address], [facility] [address] CATH LAB      Prior to Admission medications    Medication Sig Start Date End Date Taking? Authorizing Provider   budesonide (PULMICORT) 0.5 mg/2 mL nbsp 500 mcg by Nebulization route nightly. Yes Provider, Historical   tadalafiL (CIALIS) 2.5 mg tablet Take 2.5 mg by mouth nightly. Yes Provider, Historical   digoxin (LANOXIN) 0.125 mg tablet Take 1 Tab by mouth every other day. 5/26/20  Yes Levi, Shana B, NP   magnesium oxide (MAG-OX) 400 mg tablet Take 2 Tabs by mouth two (2) times a day. 5/22/20  Yes Levi, Sallye Rosas B, NP   ciprofloxacin HCl (CIPRO) 750 mg tablet Take 1 Tab by mouth two (2) times a day. 5/22/20  Yes Levi, Shana B, NP   tamsulosin (Flomax) 0.4 mg capsule Take 0.8 mg by mouth nightly.    Yes Provider, Historical   gentamicin (GARAMYCIN) 0.1 % topical ointment Apply to exit site daily. 3/16/20  Yes Gaye Houser NP   venlafaxine-SR [facility] 150 mg capsule Take 1 Cap by mouth daily (with breakfast). 3/9/20  Yes Polliard, Jurline Hashimoto, NP   levETIRAcetam (KEPPRA) 250 mg tablet Take 1 Tab by mouth two (2) times a day. 3/9/20  Yes Gaye Houser NP   L. acidoph & paracasei- S therm- Bifido (TIAN-Q/RISAQUAD) 8 billion cell cap cap Take 1 Cap by mouth daily. 3/9/20  Yes Gaye Houser NP   finasteride (PROSCAR) 5 mg tablet Take 1 Tab by mouth daily. 3/9/20  Yes Gaye Houser NP   albuterol (PROVENTIL HFA, VENTOLIN HFA, PROAIR HFA) 90 mcg/actuation inhaler Take 2 Puffs by inhalation every six (6) hours as needed for Wheezing.  20  Yes Cyndi Sims NP     Allergies   Allergen Reactions    Cefepime Other (comments)     myoclonus      Social History     Tobacco Use    Smoking status: Former Smoker     Last attempt to quit: 2010     Years since quittin.6    Smokeless tobacco: Never Used   Substance Use Topics    Alcohol use: Not Currently     Comment: rarely      Family History   Problem Relation Age of Onset    Lung Disease Mother     Hypertension Mother     Arthritis-osteo Mother     Heart Disease Mother     Heart Disease Father     Diabetes Father         Objective:      Patient Vitals for the past 24 hrs:   Temp Pulse Resp BP SpO2   20 1135 98.7 °F (37.1 °C) 82 24  100 %   20 1104    115/80    20 0852 98.5 °F (36.9 °C) 99 20  97 %   20 0845  100      20 0426 98.5 °F (36.9 °C) 88 18  97 %   20 2347 98.4 °F (36.9 °C) 95 20  93 %   20 2117 99.1 °F (37.3 °C) 95   97 %   20 2030 98.9 °F (37.2 °C) 98 22  98 %   20 1600  96 20 111/79    20 1530  95 22 (!) 122/94    20 1500  100 26 109/87    20 1430  92 22 105/81       07 -  1900  In: -   Out: 450 [Urine:450]  Physical Exam:  General:confused, No distress, Eyes:No scleral icterus, No conjunctival pallor  Neck:Supple,no mass palpable,no thyromegaly  Lungs:Clears to auscultation Bilaterally, normal respiratory effort  CVS:RRR, S1 S2 normal,  No rub,  Abdomen:Soft, Non tender, No hepatosplenomegaly  Extremities: trace LE edema  Skin:No rash or lesions, Warm and DRY   Psych:Can't access due to patient's current condition     :  No neal  Musculoskeletal : no redness, no joint tenderness  NEURO: Can't access due to patient's current condition         CODE STATUS:  DNR  Care Plan discussed with:       Chart reviewed.    y Reviewed previous records   y Discussion with patient and/or family and questions answered       ECG[de-identified] Rev:yes  Xray/CT/US/MRI REV:yes  Lab Data Personally Reviewed: (see below)  Recent Labs     07/18/20  0448 07/17/20  1151   WBC 5.6 5.3   HGB 7.8* 7.9*    174   ANEU  --  4.3   INR  --  1.1    135*   K 4.0 4.5   GLU 98 120*   BUN 30* 33*   CREA 1.99* 2.09*   ALT 14 14   TBILI 0.6 0.4    124*   CA 8.7 8.5   MG 2.7*  --    PHOS 4.1  --      Lab Results   Component Value Date/Time    Color DARK YELLOW 07/17/2020 11:51 AM    Appearance CLOUDY (A) 07/17/2020 11:51 AM    Specific gravity 1.016 07/17/2020 11:51 AM    Specific gravity 1.015 10/31/2019 11:00 AM    pH (UA) 5.0 07/17/2020 11:51 AM    Protein 100 (A) 07/17/2020 11:51 AM    Glucose Negative 07/17/2020 11:51 AM    Ketone Negative 07/17/2020 11:51 AM    Bilirubin Negative 07/17/2020 11:51 AM    Urobilinogen 0.2 07/17/2020 11:51 AM    Nitrites Negative 07/17/2020 11:51 AM    Leukocyte Esterase MODERATE (A) 07/17/2020 11:51 AM    Epithelial cells FEW 07/17/2020 11:51 AM    Bacteria 1+ (A) 07/17/2020 11:51 AM    WBC 10-20 07/17/2020 11:51 AM    RBC >100 (H) 07/17/2020 11:51 AM       Lab Results   Component Value Date/Time    Iron 19 (L) 07/18/2020 04:48 AM    TIBC 183 (L) 07/18/2020 04:48 AM    Iron % saturation 10 (L) 07/18/2020 04:48 AM    Ferritin 222 07/18/2020 04:48 AM     Lab Results   Component Value Date/Time    Culture result: NO GROWTH AFTER 9 HOURS 2020 06:59 PM    Culture result: No significant growth, <10,000 CFU/mL 2020 11:51 AM    Culture result: No growth (<1,000 CFU/ML) 2020 10:30 PM     Prior to Admission Medications   Prescriptions Last Dose Informant Patient Reported? Taking? L. acidoph & paracasei- S therm- Bifido (TIAN-Q/RISAQUAD) 8 billion cell cap cap 2020 at Unknown time  No Yes   Sig: Take 1 Cap by mouth daily. albuterol (PROVENTIL HFA, VENTOLIN HFA, PROAIR HFA) 90 mcg/actuation inhaler   No Yes   Sig: Take 2 Puffs by inhalation every six (6) hours as needed for Wheezing. budesonide (PULMICORT) 0.5 mg/2 mL nbsp   Yes Yes   Si mcg by Nebulization route nightly. ciprofloxacin HCl (CIPRO) 750 mg tablet 2020 at Unknown time  No Yes   Sig: Take 1 Tab by mouth two (2) times a day. digoxin (LANOXIN) 0.125 mg tablet 2020 at Unknown time  No Yes   Sig: Take 1 Tab by mouth every other day. finasteride (PROSCAR) 5 mg tablet 2020 at Unknown time  No Yes   Sig: Take 1 Tab by mouth daily. gentamicin (GARAMYCIN) 0.1 % topical ointment 2020 at Unknown time  No Yes   Sig: Apply to exit site daily. levETIRAcetam (KEPPRA) 250 mg tablet 2020 at Unknown time  No Yes   Sig: Take 1 Tab by mouth two (2) times a day. magnesium oxide (MAG-OX) 400 mg tablet 2020 at Unknown time  No Yes   Sig: Take 2 Tabs by mouth two (2) times a day. tadalafiL (CIALIS) 2.5 mg tablet 2020 at Unknown time  Yes Yes   Sig: Take 2.5 mg by mouth nightly. tamsulosin (Flomax) 0.4 mg capsule 2020 at Unknown time  Yes Yes   Sig: Take 0.8 mg by mouth nightly. venlafaxine-SR (EFFEXOR-XR) 150 mg capsule 2020 at Unknown time  No Yes   Sig: Take 1 Cap by mouth daily (with breakfast).       Facility-Administered Medications: None     Imaging:    Medications list Personally Reviewed   [x]      Yes     []               No    Thank you for allowing us to participate in the care this patient. We will follow patient with you. Signed By: Mel Berman MD  [facility]  [facility] SYSTGILLIAN MCKEON [facility] [address], [place], [address] [place]  Phone - [phone]         Fax - [phone] [facility] [address] Office  [address] [place] [address]  Phone - [phone]        Fax - [phone]     [email or web address]

## 2020-07-18 NOTE — CONSULTS
[address]    Name:  Roberto Frazier  MR#:  [id number]  :  1950  ACCOUNT #:  [de-identified]  DATE OF SERVICE:  2020      REASON FOR CONSULTATION:  Hematuria, BPH, incontinence, recurrent UTIs. HISTORY OF PRESENT ILLNESS:  He is a 31-year-old white male with significant cardiac history well documented and reviewed. He is currently on an LVAD with a history of chronic systolic heart failure, cardiomyopathy, EF of 15%, coronary artery disease, cardiac valve replacement, intolerant of anticoagulants due to hematuria and who has had urinary retention, urinary tract infections as well as hematuria. He is a patient of Dr. Bernardo Goss on Flomax and finasteride with known BPH. He has had a CT demonstrating no urologic pathology other than the expected circumferential bladder thickening with some renal scarring noted as well. PAST MEDICAL HISTORY:  Reviewed and deferred to [facility]. SOCIAL HISTORY:  . Former smoker. FAMILY HISTORY:  Heart disease, hypertension, diabetes. REVIEW OF SYSTEMS:  Otherwise reveals some urgency with urge incontinence. He currently denies any hematuria or feeling of incomplete emptying. PHYSICAL EXAMINATION:  GENERAL:  He is alert and oriented x3. VITAL SIGNS:  Stable. He is afebrile. NECK:  Supple. LUNGS:  Breathing easily. HEART:  He has an LVAD in place. SKIN:  No jaundice. BACK:  There is no CVA tenderness. ABDOMEN:  Benign. GENITALIA:  Including penis, meatus, scrotum, testes and groins are only notable for some atrophy. RECTAL:  Deferred due to his cardiac status. EXTREMITIES:  Without edema. LABORATORY DATA:  Labs and imaging reviewed. IMPRESSION:  History of benign prostatic hyperplasia, which apparently is mild by CT sized with medications with history of recurrent urinary tract infections, hematuria and urge incontinence, possible history of urinary retention.     RECOMMENDATION:  We will order some bladder scans to see if he is emptying. Continue him on the same medication. His current urinalysis is mildly suspicious for infection with culture pending. Continue current medications, can retry anticoagulation, bladder scan for PVRs. I will have Dr. Josephine Ron follow up, but believe he can have cystoscopy as an outpatient if it has not been performed previously. We will defer to Dr. Josephine Ron.       Oscar Beckett MD      [id number]  D:  07/18/2020 11:46  T:  07/18/2020 12:49  JOB #:  [id number]

## 2020-07-18 NOTE — PROGRESS NOTES
TRANSFER - IN REPORT:    2135 Verbal report received from Oleg George (name) on Jesus Prey  being received from ED(unit) for routine progression of care      Report consisted of patients Situation, Background, Assessment and   Recommendations(SBAR). Information from the following report(s) Kardex, Intake/Output, MAR and Cardiac Rhythm paced. was reviewed with the receiving nurse. Opportunity for questions and clarification was provided. Assessment completed upon patients arrival to unit and care assumed. Problem: Falls - Risk of  Goal: *Absence of Falls  Description: Document Kenn Hubbard Fall Risk and appropriate interventions in the flowsheet.   Outcome: Progressing Towards Goal  Note: Fall Risk Interventions:  Mobility Interventions: PT Consult for mobility concerns, PT Consult for assist device competence, Utilize walker, cane, or other assistive device, Strengthening exercises (ROM-active/passive)         Medication Interventions: Patient to call before getting OOB, Teach patient to arise slowly

## 2020-07-18 NOTE — PROGRESS NOTES
13:10 Left message for call back with answering service of Dr. Ailyn Escobar, urology, to notify post void bladder scan result indicated retention. Pt voided 250 mL of dark erica urine, post void scan showed 355 mL remaining in the bladder. Pt states he is not uncomfortable and has no urge to void additionally at this time. Awaiting call back for further orders for straight cath or neal placement. 13:15 Per Dr. Ailyn Escobar, urology, order placed to straight cath for volume greater than 500 mL, and he will reassess pt tomorrow AM.    14:56 Dr. Umair Garza notified of gram negative rods in one bottle from [address] drawn on 7/17. Ordering additional paired blood culture to be drawn tomorrow AM.    15:30 Bedside shift change report given to [address] [place] [address] (oncoming nurse) by Navdeep Herrera (offgoing nurse). Report included the following information SBAR.

## 2020-07-18 NOTE — PROGRESS NOTES
[address] [facility] Adult  Hospitalist Group                                                                                          Hospitalist Progress Note  Radha Amaral MD  Answering service: [phone] OR [phone] from in house phone        Date of Service:  2020  NAME:  Rosenda Stanley  :  1950  MRN:  [id number]      Admission Summary:   CC: confusion     71year old male with past medical history heart failure, LVEF 15%, on LVAD, paroxysmal atrial fibrillation, BPH, recent admission for hematuria, presenting to [facility] with progressive and worsening confusion. Patient seen and examined and currently tangential in thought process. Discussed with wife Neal Boudreaux, via phone. Per wife, patient has been progressively confused during the past week. Reports he has not slept for approximately 3 nights. At times, patient is coherent but then intermittently confused. He has been doing things out of the ordinary such as calling his children early in the morning or late at night. Also calling his siblings multiple times. Per wife, patient is Mandaen but usually does not speak about God to others. During my encounter, patient continuously spoke about the [address] in his past.  Patient alert to name, place, situation but not year. Denies chest pain, shortness of breath, cough, fevers, chills, sweats, urinary frequency, dysuria, abdominal pain. Endorses hematuria     In the ED, labs remarkable for UA moderate leukocyte esterase, negative nitrites, 1+ bacteria, >100 RBCs, Hgb 7.9, Cr 2.09. Patient given zosyn. Interval history / Subjective: Follow up confusion. Patient seen and examined at the bedside. Labs, images and notes reviewed  Discussed with nursing staff, orders reviewed. Plan discussed with patient/Family  Feeling okay. Alert and aware. Denied any concerns of confusion, chest pain, palpitation.      Assessment & Plan:     #Acute encephalopathy, likely metabolic from infection  #UTI, recurrent, resistant  #GNR bacteremia, 1 out of 4 bottles collected on 7/28/2012  #Chronic systolic heart failure EF 15%, s/p LVAD  #Acute renal failure  #Paroxysmal atrial fibrillation, on digoxin  #History of hematuria and anemia, Coumadin discontinued on previous hospitalization due to same  #Hyponatremia  #BPH  #Seizure disorder  #Depression  #Supratherapeutic level of digoxin    -Admitted to CVSU, telemetry, ALOS greater than 2 MN  -Cardiology [facility] team, urology, ID, nephrology consulted  -IV Zosyn  -Follow blood cultures closely  -Repeat blood cultures 7/19/2020  -Further antibiotic adjustment per ID and culture results  -Rule out urinary obstruction  -Postvoid residual and bladder scans per urology.   -Consideration for outpatient cystoscopy. Defer to Dr. Jamee Chapman, urology on further inpatient follow-up during this hospitalization  -Hold diuretics  -Albumin IV infusions  -IV iron infusion per nephrology  -Follow BMP, urine lytes  -Hold digoxin for level 1.6. Further per cardiology [facility] team  -Renal ultrasound  -Echocardiogram done 7/18/2020, result pending    Guarded to poor prognosis. High risk patient    Code status: Full code  DVT prophylaxis: SCDs    Care Plan discussed with: Patient/Family and Nurse  Anticipated Disposition: Home w/Family  Anticipated Discharge: Greater than 48 hours     Hospital Problems  Date Reviewed: 3/23/2020          Codes Class Noted POA    Acute encephalopathy ICD-10-CM: G93.40  ICD-9-CM: 348.30  7/17/2020 Unknown                Review of Systems:   A comprehensive review of systems was negative except for that written in the HPI. Vital Signs:    Last 24hrs VS reviewed since prior progress note.  Most recent are:  Visit Vitals  /80   Pulse 84   Temp 98.5 °F (36.9 °C)   Resp 20   Ht 6' 2\" (1.88 m)   Wt 79.8 kg (176 lb)   SpO2 99%   BMI 22.60 kg/m²         Intake/Output Summary (Last 24 hours) at 7/18/2020 1705  Last data filed at 7/18/2020 1411  Gross per 24 hour   Intake 1400 ml   Output 1350 ml   Net 50 ml        Physical Examination:             Constitutional:  No acute distress, cooperative, pleasant    ENT:  Oral mucosa moist, oropharynx benign. Resp:  CTA bilaterally. No wheezing/rhonchi/rales. No accessory muscle use   CV:  Regular rhythm, normal rate, no murmurs, gallops, rubs    GI:  Soft, non distended, non tender. normoactive bowel sounds, no hepatosplenomegaly     Musculoskeletal:  No edema, warm, 2+ pulses throughout    Neurologic:  Moves all extremities. AAOx3, CN II-XII reviewed     Psych:  Good insight, Not anxious nor agitated. Skin:  Good turgor, no rashes or ulcers  Hematologic/Lymphatic/Immunlogic:  No jaundice nor lymph node swelling  :  Normal genitalia. and no gross hematuria  Eyes:  EOMI. Anicteric sclerae, PERRL. Data Review:    Review and/or order of clinical lab test  Review and/or order of tests in the radiology section of CPT  Review and/or order of tests in the medicine section of [facility]    Ct Head Wo Cont    Result Date: 7/17/2020  IMPRESSION: 1. No acute intracranial abnormality. 2. Aerated secretions in the left maxillary sinus can be seen with acute sinusitis. Us Retroperitoneum Comp    Result Date: 7/18/2020  IMPRESSION: Medical renal disease. Small left kidney. No evidence for hydronephrosis or mass lesion.        Labs:     Recent Labs     07/18/20 0448 07/17/20  1151   WBC 5.6 5.3   HGB 7.8* 7.9*   HCT 25.1* 25.1*    174     Recent Labs     07/18/20 0448 07/17/20  1151    135*   K 4.0 4.5    101   CO2 25 25   BUN 30* 33*   CREA 1.99* 2.09*   GLU 98 120*   CA 8.7 8.5   MG 2.7*  --    PHOS 4.1  --      Recent Labs     07/18/20  0448 07/17/20  1151   ALT 14 14    124*   TBILI 0.6 0.4   TP 7.4 7.8   ALB 2.6* 3.0*   GLOB 4.8* 4.8*     Recent Labs     07/17/20  1151   INR 1.1   PTP 11.5*      Recent Labs     07/18/20  0448   TIBC 183*   PSAT 10*   FERR 222      Lab Results Component Value Date/Time    Folate 16.5 01/16/2020 04:57 AM      No results for input(s): PH, PCO2, PO2 in the last 72 hours. No results for input(s): CPK, CKNDX, TROIQ in the last 72 hours.     No lab exists for component: CPKMB  Lab Results   Component Value Date/Time    Cholesterol, total 90 10/26/2019 04:09 AM    HDL Cholesterol 21 10/26/2019 04:09 AM    LDL, calculated 56.2 10/26/2019 04:09 AM    Triglyceride 64 10/26/2019 04:09 AM    CHOL/HDL Ratio 4.3 10/26/2019 04:09 AM     Lab Results   Component Value Date/Time    Glucose (POC) 124 (H) 07/18/2020 04:45 PM    Glucose (POC) 116 (H) 07/18/2020 11:33 AM    Glucose (POC) 109 (H) 07/18/2020 06:13 AM    Glucose (POC) 99 01/27/2020 11:11 AM    Glucose (POC) 137 (H) 01/07/2020 11:16 AM     Lab Results   Component Value Date/Time    Color DARK YELLOW 07/17/2020 11:51 AM    Appearance CLOUDY (A) 07/17/2020 11:51 AM    Specific gravity 1.016 07/17/2020 11:51 AM    Specific gravity 1.015 10/31/2019 11:00 AM    pH (UA) 5.0 07/17/2020 11:51 AM    Protein 100 (A) 07/17/2020 11:51 AM    Glucose Negative 07/17/2020 11:51 AM    Ketone Negative 07/17/2020 11:51 AM    Bilirubin Negative 07/17/2020 11:51 AM    Urobilinogen 0.2 07/17/2020 11:51 AM    Nitrites Negative 07/17/2020 11:51 AM    Leukocyte Esterase MODERATE (A) 07/17/2020 11:51 AM    Epithelial cells FEW 07/17/2020 11:51 AM    Bacteria 1+ (A) 07/17/2020 11:51 AM    WBC 10-20 07/17/2020 11:51 AM    RBC >100 (H) 07/17/2020 11:51 AM         Medications Reviewed:     Current Facility-Administered Medications   Medication Dose Route Frequency    albumin human 5% (BUMINATE) solution 12.5 g  12.5 g IntraVENous DAILY    iron sucrose (VENOFER) 300 mg in 0.9% sodium chloride 250 mL IVPB  300 mg IntraVENous Q24H    sodium chloride (NS) flush 5-40 mL  5-40 mL IntraVENous Q8H    sodium chloride (NS) flush 5-40 mL  5-40 mL IntraVENous PRN    acetaminophen (TYLENOL) tablet 650 mg  650 mg Oral Q4H PRN    diphenhydrAMINE (BENADRYL) injection 12.5 mg  12.5 mg IntraVENous Q4H PRN    ondansetron (ZOFRAN) injection 4 mg  4 mg IntraVENous Q4H PRN    bisacodyL (DULCOLAX) tablet 5 mg  5 mg Oral DAILY PRN    [Held by provider] digoxin (LANOXIN) tablet 0.125 mg  0.125 mg Oral EVERY OTHER DAY    finasteride (PROSCAR) tablet 5 mg  5 mg Oral DAILY    lactobac ac& pc-s.therm-b.anim (TIAN Q/RISAQUAD)  1 Cap Oral DAILY    levETIRAcetam (KEPPRA) tablet 250 mg  250 mg Oral BID    magnesium oxide (MAG-OX) tablet 800 mg  800 mg Oral BID    . PHARMACY TO SUBSTITUTE PER PROTOCOL (Reordered from: tadalafiL (CIALIS) 2.5 mg tablet)    Per Protocol    tamsulosin (FLOMAX) capsule 0.8 mg  0.8 mg Oral QHS    venlafaxine-SR (EFFEXOR-XR) capsule 150 mg  150 mg Oral DAILY WITH BREAKFAST    piperacillin-tazobactam (ZOSYN) 3.375 g in 0.9% sodium chloride (MBP/ADV) 100 mL  3.375 g IntraVENous Q8H     ______________________________________________________________________  EXPECTED LENGTH OF STAY: - - -  ACTUAL LENGTH OF STAY:          0                 Shira Terry MD

## 2020-07-18 NOTE — PROGRESS NOTES
[address] [facility] in [place], [place]  Inpatient Progress Note      Patient name: Khoa Portillo  Patient : 1950  Patient MRN: [id number]  Attending MD: Louis Ratliff MD  Date of service: 20    REASON FOR CONSULT:  UTI in patient with LVAD     PLAN:  Appreciate nephrology and urology consultations  Continue current device speed at 5,800rpm  Unable to tolerate anticoagulation due to hematuria  Continue GDMR for heart failure; limited by hypotension and side effects  Hold digoxin due to level 1.6, follow levels  Cannot tolerate beta-blockers d/t RV failure  Cannot tolerate ACE/ARB/ARNi due to persistent orthostasis despite increased fluid intake   Cannot tolerate spironolactone due to IVVD and hyponatremia  Monitor CardioMEMs readings, 13 on  - reassess on Monday  Venofer infusion 200mg IV x once  Gentamicin to exit site with daily dressing changes  Echocardiogram done and results pending  Nephrology consult appreciated, bladder and renal US pending  Albumin IV  Continue keppra, check level  Urology consult placed  DNR     IMPRESSION:  UTI  Metabolic encephalopathy  Chronic systolic heart failure  Stage D, NYHA class II symptoms  Coronary artery disease  Ischemic cardiomyopathy, LVEF 15%  S/p HM3 LVAD as destination therapy (19 by Dr. Anisha Quach)  S/p Impella 5.0 as bridge to LVAD  HTN, goal MAP 70-90mmHg  H/o VT s/p [facility] BIV-ICD  PAF  H/o recurrent UTI, pseudomonal infection  Unable to tolerate AC due to hematuria  MSSA drive line infection  COPD  JOSE  Essential tremor on keppra  Depression on effexor  Persistently elevated CEA PET scan increased RML activity, not malignant per hemonc  Orthostatic hypotension  DNR     CARDIAC IMAGING:  TTE  shows large LVIDd, 6.84 cm, RVIDd 3.06 cm, TAPSE 1.15 cm, severely elevated CVP  TTE 20- Mild AI, LViDd 6.91cm, RVIDd 5.16cm, TAPSE 1.39cm     INTERVAL HISTORY:  No issues overnight  MAPs at goal  HR [de-identified]  I/O net recorded    HISTORY OF PRESENT ILLNESS:  Sona Kiser is a 71y.o. year old white male with a history of HTN, HLD, JOSE, CAD s/p cardiac arrest VFib s/p CABG (2011) c/b sternal would infection and sternectomy, ischemic cardiomyopathy LVEF 15-20%, s/p BiVICD  who underwent LVAD as DT on 11/18/2019 after bridging with Impella 5.0.  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 He was transferred to [facility] rehab on 1/30 and discharged from rehab on 2/18/2020.      Sona was seen 4/20 for an LVAD follow up visit. Due to fever, chills, and hypotension, he was referred to the ED, evaluated and admitted to [facility]. Guero Rahman underwent treatment for recurrent pseudomonas bacteremia due to UTI.  His Cipro was resumed and he was discharged home in stable condition.      presenting to [facility] with progressive and worsening confusion.  Patient seen and examined and currently tangential in thought process.  Discussed with wife Myles Romero, via phone.  Per wife, patient has been progressively confused during the past week.  Reports he has not slept for approximately 3 nights.  At times, patient is coherent but then intermittently confused. Guero Rahman has been doing things out of the ordinary such as calling his children early in the morning or late at night.  Also calling his siblings multiple times.  Per wife, patient is Rastafarian but usually does not speak about God to others. Veronica Joseing my encounter, patient continuously spoke about the [employer] in his past. In the ED, labs remarkable for UA moderate leukocyte esterase, negative nitrites, 1+ bacteria, >100 RBCs, Hgb 7.9, Cr 2.09.  Patient given zosyn.      LVAD INTERROGATION:  Device interrogated in person  Device function normal, normal flow, no events  Flows in 4s, PI 3.7    LVAD INTERROGATION:  Device interrogated in person  Device function normal, normal flow, no events  LVAD   Pump Speed (RPM): 5800  Pump Flow (LPM): 4.9  MAP: 85  PI (Pulsitility Index): 3.5  Power: 4.3   Test: Yes  Back Up  at Bedside & Labeled: Yes  Power Module Test: Yes  Driveline Site Care: No  Driveline Dressing: Clean, Dry, and Intact  Outpatient: No  MAP in Therapeutic Range (Outpatient): Yes  Testing  Alarms Reviewed: Yes  Back up SC speed: 5800  Back up Low Speed Limit: 5400  Emergency Equipment with Patient?: No  Emergency procedures reviewed?: Yes  Drive line site inspected?: Yes(covered by dressing)  Drive line intergrity inspected?: Yes  Drive line dressing changed?: No    PHYSICAL EXAM:  Visit Vitals  /80   Pulse 82   Temp 98.7 °F (37.1 °C)   Resp 24   Ht 6' 2\" (1.88 m)   Wt 176 lb (79.8 kg)   SpO2 100%   BMI 22.60 kg/m²     General: Patient is well developed, well-nourished in no acute distress  HEENT: Normocephalic and atraumatic. No scleral icterus. Pupils are equal, round and reactive to light and accomodation. No conjunctival injection. Oropharynx is clear. Neck: Supple. No evidence of thyroid enlargements or lymphadenopathy. JVD: Cannot be appreciated   Lungs: Breath sounds are equal and clear bilaterally. No wheezes, rhonchi, or rales. Heart: Regular rate and rhythm with normal S1 and S2. No murmurs, gallops or rubs. Abdomen: Soft, no mass or tenderness. No organomegaly or hernia. Bowel sounds present. Genitourinary and rectal: deferred  Extremities: No cyanosis, clubbing, or edema. Neurologic: No focal sensory or motor deficits are noted. Grossly intact. Psychiatric: Awake, alert an doriented x 3. Appropriate mood and affect. Skin: Warm, dry and well perfused. No lesions, nodules or rashes are noted. REVIEW OF SYSTEMS:  General: Denies fever, night sweats.   Ear, nose and throat: Denies difficulty hearing, sinus problems, runny nose, post-nasal drip, ringing in ears, mouth sores, loose teeth, ear pain, nosebleeds, sore throate, facial pain or numbess  Cardiovascular: see above in the interval history  Respiratory: Denies cough, wheezing, sputum production, hemoptysis.   Gastrointestinal: Denies heartburn, constipation, intolerance to certain foods, diarrhea, abdominal pain, nausea, vomiting, difficulty swallowing, blood in stool  Kidney and bladder: Denies painful urination, frequent urination, urgency, prostate problems and impotence  Musculoskeletal: Denies joint pain, muscle weakness  Skin and hair: Denies change in existing skin lesions, hair loss or increase, breast changes    PAST MEDICAL HISTORY:  Past Medical History:   Diagnosis Date    BPH (benign prostatic hyperplasia)     [facility]     Degenerative disc disease, lumbar     Heart failure (HCC)     High cholesterol     Hypertension     Ischemic cardiomyopathy     LVAD (left ventricular assist device) present ([facility]     Paroxysmal atrial fibrillation ([facility] 4/2/2019    Spinal stenosis        PAST SURGICAL HISTORY:  Past Surgical History:   Procedure Laterality Date    COLONOSCOPY Left 11/11/2019    COLONOSCOPY at bedside performed by Annie Barrientos MD at [address] HX APPENDECTOMY      [address]      triple    HX HEART ASSIST DEVICE Left 10/29/2019    Impella 5.0    HX HEART ASSIST DEVICE Left 11/18/2019    HeartMate 3    HX HERNIA REPAIR      HX IMPLANTABLE CARDIOVERTER DEFIBRILLATOR      HX THROMBECTOMY Left 11/25/2019    Left brachial thrombectomy    UT CARDIOVERSION ELECTIVE ARRHYTHMIA EXTERNAL N/A 6/10/2019    EP CARDIOVERSION performed by Lazarus Hoit, MD at [facility] [address], [facility] CATH LAB    UT CARDIOVERSION ELECTIVE ARRHYTHMIA EXTERNAL N/A 6/18/2019    EP CARDIOVERSION performed by Marino Harrison MD at [place] [address], [address] CATH LAB    UT INSJ/RPLCMT PERM DFB W/TRNSVNS LDS 1/DUAL CHMBR N/A 6/21/2019    INSERT ICD BIV MULTI performed by Burt Deras MD at [facility] LAB    [place] TCAT IMPL WRLS P-ART PRS SNR L-T HEMODYN MNTR N/A 2019    IMPLANT HEART FAILURE MONITORING DEVICE performed by Ally Neal MD at [address], [facility] [facility] LAB       FAMILY HISTORY:  Family History   Problem Relation Age of Onset    Lung Disease Mother     Hypertension Mother     Arthritis-osteo Mother     Heart Disease Mother     Heart Disease Father     Diabetes Father        SOCIAL HISTORY:  Social History     Socioeconomic History    Marital status:      Spouse name: Not on file    Number of children: Not on file    Years of education: Not on file    Highest education level: Not on file   Tobacco Use    Smoking status: Former Smoker     Last attempt to quit: 2010     Years since quittin.6    Smokeless tobacco: Never Used   Substance and Sexual Activity    Alcohol use: Not Currently     Comment: rarely    Drug use: Never   Other Topics Concern       LABORATORY RESULTS:     Labs Latest Ref Rng & Units 2020   WBC 4.1 - 11.1 K/uL 5.6 5.3 5.8 3.8(L) 4.3 4.2 3.6(L)   RBC 4.10 - 5.70 M/uL 2.60(L) 2.59(L) 2.60(LL) 2.85(L) 2.73(L) 2.67(L) 2.63(L)   Hemoglobin 12.1 - 17.0 g/dL 7. 8(L) 7. 9(L) 7. 9(L) 8.8(L) 8.4(L) 8. 3(L) 8.0(L)   Hematocrit 36.6 - 50.3 % 25. 1(L) 25. 1(L) 24. 7(L) 28. 1(L) 27. 2(L) 26. 5(L) 26. 2(L)   MCV 80.0 - 99.0 FL 96.5 96.9 95 98.6 99. 6(H) 99. 3(H) 99. 6(H)   Platelets 425 - 463 K/uL 208 174 203 153 159 161 154   Lymphocytes 12 - 49 % - 8(L) - - - - -   Monocytes 5 - 13 % - 9 - - - - -   Eosinophils 0 - 7 % - 1 - - - - -   Basophils 0 - 1 % - 0 - - - - -   Albumin 3.5 - 5.0 g/dL 2. 6(L) 3.0(L) 3. 3(L) 2. 5(L) 2. 5(L) 2. 5(L) 2. 4(L)   Calcium 8.5 - 10.1 MG/DL 8.7 8.5 8.6 8. 1(L) 8.4(L) 8. 1(L) 7. 5(L)   Glucose 65 - 100 mg/dL 98 120(H) 85 96 92 94 93   BUN 6 - 20 MG/DL 30(H) 33(H) 24 18 17 18 18   Creatinine 0.70 - 1.30 MG/DL 1.99(H) 2.09(H) 1.42(H) 1.18 1.15 1.24 1.19   Sodium 136 - 145 mmol/L 136 135(L) 135 135(L) 136 138 136   Potassium 3.5 - 5.1 mmol/L 4.0 4.5 4.4 4.1 4.2 4.3 4.2   TSH 0.36 - 3.74 uIU/mL 1.13 - - - - - -   PSA 0.01 - 4.0 ng/mL - - - - - - 0.4   LDH 85 - 241 U/L - - - 204 193 195 182   Some recent data might be hidden     Lab Results   Component Value Date/Time    TSH 1.13 07/18/2020 11:28 AM    TSH 1.70 04/21/2020 01:59 PM    TSH 2.30 12/03/2019 03:29 AM    TSH 2.40 10/25/2019 07:39 PM    TSH 2.45 06/01/2019 04:16 AM       ALLERGY:  Allergies   Allergen Reactions    Cefepime Other (comments)     myoclonus        CURRENT MEDICATIONS:    Current Facility-Administered Medications:     albumin human 5% (BUMINATE) solution 12.5 g, 12.5 g, IntraVENous, DAILY, Mike Henriquez MD, 12.5 g at 07/18/20 0917    iron sucrose (VENOFER) 300 mg in 0.9% sodium chloride 250 mL IVPB, 300 mg, IntraVENous, Q24H, Shweta Cam MD    sodium chloride (NS) flush 5-40 mL, 5-40 mL, IntraVENous, Q8H, Hyun Zhao DO, 30 mL at 07/18/20 1309    sodium chloride (NS) flush 5-40 mL, 5-40 mL, IntraVENous, PRN, CARLOS Jim, DO    acetaminophen (TYLENOL) tablet 650 mg, 650 mg, Oral, Q4H PRN, Hyun Jim DO    diphenhydrAMINE (BENADRYL) injection 12.5 mg, 12.5 mg, IntraVENous, Q4H PRN, CARLOS Jim, DO    ondansetron [facility]) injection 4 mg, 4 mg, IntraVENous, Q4H PRN, Sera Burch [employer] GILLIAN, DO    bisacodyL (DULCOLAX) tablet 5 mg, 5 mg, Oral, DAILY PRN, Hyun Jim DO    [Held by provider] digoxin (LANOXIN) tablet 0.125 mg, 0.125 mg, Oral, EVERY OTHER DAY, Hyun Zhao,     finasteride (PROSCAR) tablet 5 mg, 5 mg, Oral, DAILY, Hyun Zhao, , 5 mg at 07/18/20 3499    lactobac ac& pc-s.therm-b.anim (TIAN Q/RISAQUAD), 1 Cap, Oral, DAILY, Hyun Zhao, , 1 Cap at 07/18/20 0791    levETIRAcetam (KEPPRA) tablet 250 mg, 250 mg, Oral, BID, Hyun Zhao, , 250 mg at 07/18/20 6020    magnesium oxide (MAG-OX) tablet 800 mg, 800 mg, Oral, BID, Daniella FRENCH DO, Stopped at 07/18/20 0099   [facility] TO SUBSTITUTE PER PROTOCOL (Reordered from: tadalafiL (CIALIS) 2.5 mg tablet), , , Per Protocol, CARLOS Croft DO    tamsulosin [facility]) capsule 0.8 mg, 0.8 mg, Oral, QHS, Hyun Zhao DO, 0.8 mg at 07/17/20 2348    venlafaxine-SR (EFFEXOR-XR) capsule 150 mg, 150 mg, Oral, DAILY WITH BREAKFAST, Hyun Zhao DO, 150 mg at 07/18/20 0918    piperacillin-tazobactam (ZOSYN) 3.375 g in 0.9% sodium chloride (MBP/ADV) 100 mL, 3.375 g, IntraVENous, Q8H, Hyun Zhao DO, Last Rate: 25 mL/hr at 07/18/20 1243, 3.375 g at 07/18/20 1243    Thank you for allowing me to participate in this patient's care.     Amber Acosta MD PhD  [address] [place] [address], [address]  Phone: [phone]  Fax: [phone]

## 2020-07-18 NOTE — PROGRESS NOTES
Observation notice provided in writing to patient and/or caregiver as well as verbal explanation of the policy. Patients who are in outpatient status also receive the Observation notice. Verbal consent over telephone with patient's wife.   SUSHIL Gallego

## 2020-07-18 NOTE — ROUTINE PROCESS
TRANSFER - OUT REPORT:    Verbal report given to [facility] RN(name) on Dmitri Ar  being transferred to [facility](unit) for routine progression of care       Report consisted of patients Situation, Background, Assessment and   Recommendations(SBAR). Information from the following report(s) SBAR, Kardex, ED Summary, [facility] and Recent Results was reviewed with the receiving nurse. Lines:   Peripheral IV 07/17/20 Left Antecubital (Active)   Site Assessment Clean, dry, & intact 07/17/20 1201   Phlebitis Assessment 0 07/17/20 1201   Infiltration Assessment 0 07/17/20 1201   Dressing Status Clean, dry, & intact 07/17/20 1201       Peripheral IV 07/17/20 Right Antecubital (Active)   Site Assessment Clean, dry, & intact 07/17/20 1900   Phlebitis Assessment 0 07/17/20 1900   Infiltration Assessment 0 07/17/20 1900   Dressing Status Clean, dry, & intact 07/17/20 1900   Dressing Type Transparent 07/17/20 1900   Hub Color/Line Status Pink;Flushed;Patent 07/17/20 1900   Action Taken Blood drawn 07/17/20 1900        Opportunity for questions and clarification was provided.       Patient transported with:   Monitor  Registered Nurse

## 2020-07-19 NOTE — PROGRESS NOTES
[facility] VAD/Heart Failure Progress Note    Admit Date: 2020  POD:  * No surgery found *    Procedure:          Subjective: Insomnia, mild confusion, dyspnea, and weakness; abx's for UTI     Objective:   Vitals:  Blood pressure 115/80, pulse 86, temperature 98.4 °F (36.9 °C), resp. rate 18, height 6' 2\" (1.88 m), weight 177 lb 4 oz (80.4 kg), SpO2 97 %. Temp (24hrs), Av.4 °F (36.9 °C), Min:98.1 °F (36.7 °C), Max:98.7 °F (37.1 °C)    Hemodynamics:   CO:     CI:     CVP:     SVR:     PAP Systolic:     PAP Diastolic:     PVR:     EL55:     SCV02:      VAD Interrogation: LVAD (Heartmate)  Pump Speed (RPM): 5800  Pump Flow (LPM): 4.7  PI (Pulsitility Index): 3.7  Power: 4.4  MAP: 86   Test: Yes  Back Up  at Bedside & Labeled: Yes  Power Module Test: Yes  Driveline Site Care: No  Driveline Dressing: Clean, Dry, and Intact    EKG/Rhythm:      Extubation Date / Time:     CT Output:     Ventilator:       Oxygen Therapy:  Oxygen Therapy  O2 Sat (%): 97 % (20 08)  Pulse via Oximetry: 88 beats per minute (20 0426)  O2 Device: Room air (20)    CXR:    Admission Weight: Last Weight   Weight: 175 lb 0.7 oz (79.4 kg) Weight: 177 lb 4 oz (80.4 kg)     Intake / Output / Drain:  Current Shift: No intake/output data recorded. Last 24 hrs.:     Intake/Output Summary (Last 24 hours) at 2020 0977  Last data filed at 2020 0358  Gross per 24 hour   Intake 1500 ml   Output 1200 ml   Net 300 ml             No results for input(s): CPK, CKMB, TROIQ in the last 72 hours.   Recent Labs     20  0448 20  1151    135*   K 4.0 4.5   CO2 25 25   BUN 30* 33*   CREA 1.99* 2.09*   GLU 98 120*   PHOS 4.1  --    MG 2.7*  --    WBC 5.6 5.3   HGB 7.8* 7.9*   HCT 25.1* 25.1*    174     Recent Labs     20  1151   INR 1.1   PTP 11.5*     No lab exists for component: PBNP        Current Facility-Administered Medications:     iron sucrose (VENOFER) injection 200 mg, 200 mg, IntraVENous, DAILY, Antonio Isaac MD    albumin human 5% (BUMINATE) solution 12.5 g, 12.5 g, IntraVENous, DAILY, Antonio Isaac MD, 12.5 g at 07/19/20 0836    iron sucrose (VENOFER) 300 mg in 0.9% sodium chloride 250 mL IVPB, 300 mg, IntraVENous, Q24H, Shelton Angeles MD, Last Rate: 176.7 mL/hr at 07/18/20 1714, 300 mg at 07/18/20 1714    sodium chloride (NS) flush 5-40 mL, 5-40 mL, IntraVENous, Q8H, Hyun Zhao, DO, 10 mL at 07/19/20 0718    sodium chloride (NS) flush 5-40 mL, 5-40 mL, IntraVENous, PRN, Merlin Kluver, [employer] M, DO    acetaminophen (TYLENOL) tablet 650 mg, 650 mg, Oral, Q4H PRN, Joseph FRENCH, DO    diphenhydrAMINE (BENADRYL) injection 12.5 mg, 12.5 mg, IntraVENous, Q4H PRN, Merlin Kluver, [employer] M, DO    ondansetron [facility]) injection 4 mg, 4 mg, IntraVENous, Q4H PRN, Merlin Kluver, [employer] M, DO    bisacodyL (DULCOLAX) tablet 5 mg, 5 mg, Oral, DAILY PRN, Merlin Kluver, [employer] M, DO    [Held by provider] digoxin (LANOXIN) tablet 0.125 mg, 0.125 mg, Oral, EVERY OTHER DAY, Hyun Zhao, DO    finasteride (PROSCAR) tablet 5 mg, 5 mg, Oral, DAILY, Hyun Zhao, DO, 5 mg at 07/19/20 6990    lactobac ac& pc-s.therm-b.anim (TIAN Q/RISAQUAD), 1 Cap, Oral, DAILY, Hyun Zhao, DO, 1 Cap at 07/19/20 0836    levETIRAcetam (KEPPRA) tablet 250 mg, 250 mg, Oral, BID, Hyun Zhao, DO, 250 mg at 07/19/20 0836    magnesium oxide (MAG-OX) tablet 800 mg, 800 mg, Oral, BID, Joseph FRENCH DO, 800 mg at 07/19/20 0836    . PHARMACY TO SUBSTITUTE PER PROTOCOL (Reordered from: tadalafiL (CIALIS) 2.5 mg tablet), , , Per Protocol, Joseph FRENCH DO    tamsulosin [facility]) capsule 0.8 mg, 0.8 mg, Oral, QHS, Hyun Zhao, , 0.8 mg at 07/18/20 2115    venlafaxine-SR (EFFEXOR-XR) capsule 150 mg, 150 mg, Oral, DAILY WITH BREAKFAST, Hyun Zhao DO, 150 mg at 07/19/20 0836    piperacillin-tazobactam (ZOSYN) 3.375 g in 0.9% sodium chloride (MBP/ADV) 100 mL, 3.375 g, IntraVENous, Q8H, Hyun Zhao, DO, Last Rate: 25 mL/hr at 07/19/20 0422, 3.375 g at 07/19/20 0422    A/P  S/P LVAD - good flows  Need for anticoagulation - on hodl due to recurrent bleeds  UTI - abx's  Confusion - monitor     Risk of morbidity and mortality - high  Medical decision making - high complexity       Signed By: Klaus Gibbs MD

## 2020-07-19 NOTE — PROGRESS NOTES
ID Progress Note  2020    Subjective: \"I feel much better than yesterday\"     Review of Systems:            Symptom Y/N Comments   Symptom Y/N Comments   Fever/Chills n      Chest Pain  n      Poor Appetite       Edema        Cough n      Abdominal Pain        Sputum       Joint Pain        SOB/DOUGLAS  n     Pruritis/Rash        Nausea/vomit n      Tolerating PT/OT        Diarrhea  n     Tolerating Diet        Constipation  n     Other           Could NOT obtain due to:       Objective:     Vitals:   Visit Vitals  /80   Pulse 86   Temp 98.7 °F (37.1 °C)   Resp 18   Ht 6' 2\" (1.88 m)   Wt 80.4 kg (177 lb 4 oz)   SpO2 96%   BMI 22.76 kg/m²        Tmax:  Temp (24hrs), Av.4 °F (36.9 °C), Min:98.1 °F (36.7 °C), Max:98.7 °F (37.1 °C)      PHYSICAL EXAM:  General: Ill appearing. Alert, cooperative, no acute distress    EENT:  EOMI. Anicteric sclerae. MMM  Resp:  Clear in apex with decreased breath sound. no wheezing or rales. No accessory muscle use                          (+) LVAD hum  CV:  Regular  rhythm,  No edema  GI:  Soft, Non distended, Non tender. +Bowel sounds, LVAD insertion site dressing dry and intact  Neurologic:  Alert and oriented X 3, normal speech,   Psych:   Good insight. Not anxious nor agitated  Skin:  No rashes.   No jaundice    Labs:   Lab Results   Component Value Date/Time    WBC 5.2 2020 10:55 AM    HGB 7.8 (L) 2020 10:55 AM    HCT 26.2 (L) 2020 10:55 AM    PLATELET 075  10:55 AM    MCV 97.4 2020 10:55 AM     Lab Results   Component Value Date/Time    Sodium 139 2020 04:38 AM    Sodium 138 2020 04:38 AM    Potassium 4.2 2020 04:38 AM    Potassium 4.1 2020 04:38 AM    Chloride 107 2020 04:38 AM    Chloride 107 2020 04:38 AM    CO2 24 2020 04:38 AM    CO2 24 2020 04:38 AM    Anion gap 8 2020 04:38 AM    Anion gap 7 2020 04:38 AM    Glucose 104 (H) 2020 04:38 AM    Glucose 107 (H) 07/19/2020 04:38 AM    BUN 26 (H) 07/19/2020 04:38 AM    BUN 27 (H) 07/19/2020 04:38 AM    Creatinine 1.86 (H) 07/19/2020 04:38 AM    Creatinine 1.88 (H) 07/19/2020 04:38 AM    BUN/Creatinine ratio 14 07/19/2020 04:38 AM    BUN/Creatinine ratio 14 07/19/2020 04:38 AM    GFR est AA 44 (L) 07/19/2020 04:38 AM    GFR est AA 43 (L) 07/19/2020 04:38 AM    GFR est non-AA 36 (L) 07/19/2020 04:38 AM    GFR est non-AA 36 (L) 07/19/2020 04:38 AM    Calcium 8.2 (L) 07/19/2020 04:38 AM    Calcium 8.4 (L) 07/19/2020 04:38 AM    Bilirubin, total 0.5 07/19/2020 04:38 AM    Bilirubin, total 0.5 07/19/2020 04:38 AM    Alk. phosphatase 100 07/19/2020 04:38 AM    Alk. phosphatase 99 07/19/2020 04:38 AM    Protein, total 6.5 07/19/2020 04:38 AM    Protein, total 6.9 07/19/2020 04:38 AM    Albumin 2.7 (L) 07/19/2020 04:38 AM    Albumin 2.6 (L) 07/19/2020 04:38 AM    Globulin 3.8 07/19/2020 04:38 AM    Globulin 4.3 (H) 07/19/2020 04:38 AM    A-G Ratio 0.7 (L) 07/19/2020 04:38 AM    A-G Ratio 0.6 (L) 07/19/2020 04:38 AM    ALT (SGPT) 10 (L) 07/19/2020 04:38 AM    ALT (SGPT) 11 (L) 07/19/2020 04:38 AM       Results     Procedure Component Value Units Date/Time    CULTURE, BLOOD, PAIRED [[id number]] Collected:  07/19/20 0438    Order Status:  Completed Specimen:  Blood Updated:  07/19/20 0636    CULTURE, BLOOD, PAIRED [[id number]]  (Abnormal) Collected:  07/17/20 6634    Order Status:  Completed Specimen:  Blood Updated:  07/19/20 1437     Special Requests: NO SPECIAL REQUESTS        Culture result:       PROBABLE PSEUDOMONAS SPECIES GROWING IN 2 OF 4 BOTTLES DRAWN (SITES = RAC AND LAC)                  PRELIMINARY REPORT OF GRAM NEGATIVE RODS GROWING IN 2 OF 4 BOTTLES DRAWN CALLED TO AND READ BACK BY  MS Mark WILKES AT [address] [place] [address] ON 7/18/20.   PJ                  REMAINING BOTTLE(S) HAS/HAVE NO GROWTH SO FAR          CULTURE, URINE [[id number]]     Order Status:  Canceled Specimen:  Urine from Clean catch     URINE CULTURE HOLD SAMPLE [[id number]] Collected:  07/17/20 1151    Order Status:  Completed Specimen:  Urine from Serum Updated:  07/17/20 1204     Urine culture hold       Urine on hold in Microbiology dept for 2 days. If unpreserved urine is submitted, it cannot be used for addtional testing after 24 hours, recollection will be required.           CULTURE, URINE [[id number]] Collected:  07/17/20 1151    Order Status:  Completed Specimen:  Urine from Clean catch Updated:  07/18/20 1155     Special Requests: NO SPECIAL REQUESTS        Culture result:       No significant growth, <10,000 CFU/mL              Assessment and Plan   · LVAD drive line infection  · History of Pseudomonas bacteremia  · AMS (improved; alert and orient x 3 during visit)       blood cx (7/17): pseudomonas, gram (-) rods       Blood cx (7/19) pending        Urine cx No significant growth, <10,000 CFU/mL          -> continue with IV Zosyn    Valarie Wynn NP

## 2020-07-19 NOTE — PROGRESS NOTES
0730: Bedside and Verbal shift change report given to Carla, [facility] (oncoming nurse) by Shon Ballard and Burt, [facility] (offgoing nurse). Report included the following information SBAR, Kardex, Intake/Output, MAR, Accordion, Recent Results and Cardiac Rhythm NSR, 1st degree. 1000: Dr. Tiny Manuel at bedside, reviewed post void residuals over last 24 hours. Plan to bladder scan as needed and straight cath over 500 PRN      1930: Bedside and Verbal shift change report given to Afsaneh Rodrigues RN (oncoming nurse) by Rock Fox (offgoing nurse). Report included the following information SBAR, Kardex, Intake/Output, MAR, Accordion, Recent Results and Cardiac Rhythm NSR, 1st degree. Problem: Falls - Risk of  Goal: *Absence of Falls  Description: Document Dianne Sierra Fall Risk and appropriate interventions in the flowsheet.   Outcome: Progressing Towards Goal  Note: Fall Risk Interventions:  Mobility Interventions: Assess mobility with egress test, Communicate number of staff needed for ambulation/transfer, OT consult for ADLs, Patient to call before getting OOB, PT Consult for mobility concerns, PT Consult for assist device competence         Medication Interventions: Evaluate medications/consider consulting pharmacy, Patient to call before getting OOB, Teach patient to arise slowly    Elimination Interventions: Call light in reach, Patient to call for help with toileting needs, Toileting schedule/hourly rounds, Urinal in reach              Problem: Patient Education: Go to Patient Education Activity  Goal: Patient/Family Education  Outcome: Progressing Towards Goal     Problem: Patient Education: Go to Patient Education Activity  Goal: Patient/Family Education  Outcome: Progressing Towards Goal     Problem: LVAD: Discharge Outcomes  Goal: *Hemodynamically stable  Outcome: Progressing Towards Goal  Goal: *Lungs clear or at baseline  Outcome: Progressing Towards Goal  Goal: *Tolerating diet  Outcome: Progressing Towards Goal  Goal: *LVAD parameters within set limits  Outcome: Progressing Towards Goal  Goal: *LVAD drive line site without signs and symptoms of wound complications  Outcome: Progressing Towards Goal

## 2020-07-19 NOTE — PROGRESS NOTES
[facility] VAD/Heart Failure Progress Note    Admit Date: 2020  POD:  * No surgery found *    Procedure:          Subjective: Insomnia, confusion, mild dyspnea and fatigue     Objective:   Vitals:  Blood pressure 115/80, pulse 86, temperature 98.4 °F (36.9 °C), resp. rate 18, height 6' 2\" (1.88 m), weight 177 lb 4 oz (80.4 kg), SpO2 97 %. Temp (24hrs), Av.4 °F (36.9 °C), Min:98.1 °F (36.7 °C), Max:98.7 °F (37.1 °C)    Hemodynamics:   CO:     CI:     CVP:     SVR:     PAP Systolic:     PAP Diastolic:     PVR:     YO06:     SCV02:      VAD Interrogation: LVAD (Heartmate)  Pump Speed (RPM): 5800  Pump Flow (LPM): 4.7  PI (Pulsitility Index): 3.7  Power: 4.4  MAP: 86   Test: Yes  Back Up  at Bedside & Labeled: Yes  Power Module Test: Yes  Driveline Site Care: No  Driveline Dressing: Clean, Dry, and Intact    EKG/Rhythm:      Extubation Date / Time:     CT Output:     Ventilator:       Oxygen Therapy:  Oxygen Therapy  O2 Sat (%): 97 % (20)  Pulse via Oximetry: 88 beats per minute (20 0426)  O2 Device: Room air (20)    CXR:    Admission Weight: Last Weight   Weight: 175 lb 0.7 oz (79.4 kg) Weight: 177 lb 4 oz (80.4 kg)     Intake / Output / Drain:  Current Shift: No intake/output data recorded. Last 24 hrs.:     Intake/Output Summary (Last 24 hours) at 2020 0941  Last data filed at 2020 0354  Gross per 24 hour   Intake 1500 ml   Output 1200 ml   Net 300 ml             No results for input(s): CPK, CKMB, TROIQ in the last 72 hours.   Recent Labs     20  0448 20  1151    135*   K 4.0 4.5   CO2 25 25   BUN 30* 33*   CREA 1.99* 2.09*   GLU 98 120*   PHOS 4.1  --    MG 2.7*  --    WBC 5.6 5.3   HGB 7.8* 7.9*   HCT 25.1* 25.1*    174     Recent Labs     20  1151   INR 1.1   PTP 11.5*     No lab exists for component: PBNP        Current Facility-Administered Medications:     iron sucrose (VENOFER) injection 200 mg, 200 mg, IntraVENous, DAILY, Cuca Robles MD    albumin human 5% (BUMINATE) solution 12.5 g, 12.5 g, IntraVENous, DAILY, Cuca Robles MD, 12.5 g at 07/19/20 0836    iron sucrose (VENOFER) 300 mg in 0.9% sodium chloride 250 mL IVPB, 300 mg, IntraVENous, Q24H, Briseida Gray MD, Last Rate: 176.7 mL/hr at 07/18/20 1714, 300 mg at 07/18/20 1714    sodium chloride (NS) flush 5-40 mL, 5-40 mL, IntraVENous, Q8H, Hyun Zhao, DO, 10 mL at 07/19/20 0718    sodium chloride (NS) flush 5-40 mL, 5-40 mL, IntraVENous, PRN, Sheng Luxemburg, [employer], DO    acetaminophen (TYLENOL) tablet 650 mg, 650 mg, Oral, Q4H PRN, Sara Mean M, DO    diphenhydrAMINE (BENADRYL) injection 12.5 mg, 12.5 mg, IntraVENous, Q4H PRN, Sheng Luxemburg, [employer], DO    ondansetron [facility]) injection 4 mg, 4 mg, IntraVENous, Q4H PRN, Sheng Mily, [employer], DO    bisacodyL (DULCOLAX) tablet 5 mg, 5 mg, Oral, DAILY PRN, Sheng Luxemburg, [employer], DO    [Held by provider] digoxin (LANOXIN) tablet 0.125 mg, 0.125 mg, Oral, EVERY OTHER DAY, Hyun Zhao M, DO    finasteride (PROSCAR) tablet 5 mg, 5 mg, Oral, DAILY, Hyun Zhao M, DO, 5 mg at 07/19/20 2965    lactobac ac& pc-s.therm-b.anim (TIAN Q/RISAQUAD), 1 Cap, Oral, DAILY, Hyun Zhao, DO, 1 Cap at 07/19/20 0836    levETIRAcetam (KEPPRA) tablet 250 mg, 250 mg, Oral, BID, Hyun Zhao, DO, 250 mg at 07/19/20 0836    magnesium oxide (MAG-OX) tablet 800 mg, 800 mg, Oral, BID, Sara FRENHC DO, 800 mg at 07/19/20 0836    . PHARMACY TO SUBSTITUTE PER PROTOCOL (Reordered from: tadalafiL (CIALIS) 2.5 mg tablet), , , Per Protocol, [employer] [employer] M, DO    tamsulosin [facility]) capsule 0.8 mg, 0.8 mg, Oral, QHS, Hyun Zhao, DO, 0.8 mg at 07/18/20 2115    venlafaxine-SR (EFFEXOR-XR) capsule 150 mg, 150 mg, Oral, DAILY WITH BREAKFAST, Hyun Zhao DO, 150 mg at 07/19/20 0836    piperacillin-tazobactam (ZOSYN) 3.375 g in 0.9% sodium chloride (MBP/ADV) 100 mL, 3.375 g, IntraVENous, Q8H, Lorraine FRENCH, DO, Last Rate: 25 mL/hr at 07/19/20 0422, 3.375 g at 07/19/20 0422    A/P  S/P LVAD - good flows  Need for anticoagulation - on hodl due to recurrent bleeds  UTI - abx's  Confusion - monitor    Risk of morbidity and mortality - high  Medical decision making - high complexity    Signed By: Jose L Bullard MD

## 2020-07-19 NOTE — PROGRESS NOTES
[facility]  Hospitalist Group                                                                                          Hospitalist Progress Note  Jose F Steel MD  Answering service: [phone] OR [phone] from in house phone        Date of Service:  2020  NAME:  Marcos Merlos  :  1950  MRN:  [id number]      Admission Summary:   CC: confusion     71year old male with past medical history heart failure, LVEF 15%, on LVAD, paroxysmal atrial fibrillation, BPH, recent admission for hematuria, presenting to [facility] with progressive and worsening confusion. Patient seen and examined and currently tangential in thought process. Discussed with wife Isaias Rutherford, via phone. Per wife, patient has been progressively confused during the past week. Reports he has not slept for approximately 3 nights. At times, patient is coherent but then intermittently confused. He has been doing things out of the ordinary such as calling his children early in the morning or late at night. Also calling his siblings multiple times. Per wife, patient is Bahai but usually does not speak about God to others. During my encounter, patient continuously spoke about the Mitchael Ebbing in his past.  Patient alert to name, place, situation but not year. Denies chest pain, shortness of breath, cough, fevers, chills, sweats, urinary frequency, dysuria, abdominal pain. Endorses hematuria     In the ED, labs remarkable for UA moderate leukocyte esterase, negative nitrites, 1+ bacteria, >100 RBCs, Hgb 7.9, Cr 2.09. Patient given zosyn. Interval history / Subjective: Follow up confusion. Patient seen and examined at the bedside. Labs, images and notes reviewed  Discussed with nursing staff, orders reviewed. Plan discussed with patient/Family  Resting comfortably. Denied any fever, chills, confusion, chest pain, palpitation. Had straight cath x1 yesterday with 500 mL out.   Urology okay with PRN straight cath with periodic bladder scans, cath for more than 500 mL. Has history of LVAD driveline infection and bacteremia in the past.  Urology, ID, nephrology, [facility] teams on board. Assessment & Plan:     #Acute encephalopathy, likely metabolic from infection, better  #UTI, recurrent, resistant. Urology on board  #GNR bacteremia, 1 out of 4 bottles collected on 7/17/2020, preliminarily report suggest Pseudomonas. 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 Advanced heart failure cardiology team on board  #Acute renal failure, creatinine 1.88, nephrology on board  #Paroxysmal atrial fibrillation, on digoxin  #History of hematuria and anemia, Coumadin discontinued on previous hospitalization due to same  #Hyponatremia  #BPH  #Seizure disorder  #Depression  #Supratherapeutic level of digoxin    -Admitted to CVSU, telemetry, ALOS greater than 2 MN  -Cardiology [facility] team, urology, ID, nephrology consulted  -IV Zosyn. ID okay with current antibiotics at present. Defer further based on culture results to ID  -Follow blood cultures closely  -Repeat blood cultures 7/19/2020collected this a.m.  -Further antibiotic adjustment per ID and culture results  -Rule out urinary obstruction. No need of Lizama catheterization per urology.  -Serial bladder scans for postvoid residual and PRN straight cath if more than 500 mL per urology.   -Consideration for outpatient cystoscopy. Defer to Dr. Jossie Vazquez, urology on further inpatient follow-up during this hospitalization  -Hold diuretics. Follow cardiology and nephrology recommendation to resume  -Albumin IV infusions  -IV iron infusion per nephrology  -Follow BMP, urine lytes  -Hold digoxin for level 1.6. Further per cardiology [facility] team  -Renal ultrasound  -Echocardiogram done 7/18/2020, result pending    Guarded to poor prognosis.   High risk patient    Code status: Full code  DVT prophylaxis: SCDs    Care Plan discussed with: Patient/Family and Nurse  Anticipated Disposition: Home w/Family  Anticipated Discharge: Greater than 48 hours     Hospital Problems  Date Reviewed: 3/23/2020          Codes Class Noted POA    Acute encephalopathy ICD-10-CM: G93.40  ICD-9-CM: 348.30  7/17/2020 Unknown                Review of Systems:   A comprehensive review of systems was negative except for that written in the HPI. Vital Signs:    Last 24hrs VS reviewed since prior progress note. Most recent are:  Visit Vitals  /80   Pulse 86   Temp 98.7 °F (37.1 °C)   Resp 18   Ht 6' 2\" (1.88 m)   Wt 80.4 kg (177 lb 4 oz)   SpO2 96%   BMI 22.76 kg/m²         Intake/Output Summary (Last 24 hours) at 7/19/2020 1353  Last data filed at 7/19/2020 1240  Gross per 24 hour   Intake 2000 ml   Output 1225 ml   Net 775 ml        Physical Examination:             Constitutional:  No acute distress, cooperative, pleasant    ENT:  Oral mucosa moist, oropharynx benign. Resp:  CTA bilaterally. No wheezing/rhonchi/rales. No accessory muscle use   CV:  Regular rhythm, normal rate, no murmurs, gallops, rubs    GI:  Soft, non distended, non tender. normoactive bowel sounds, no hepatosplenomegaly     Musculoskeletal:  No edema, warm, 2+ pulses throughout    Neurologic:  Moves all extremities. AAOx3, CN II-XII reviewed     Psych:  Good insight, Not anxious nor agitated. Skin:  Good turgor, no rashes or ulcers  Hematologic/Lymphatic/Immunlogic:  No jaundice nor lymph node swelling  :  Normal genitalia. and no gross hematuria  Eyes:  EOMI. Anicteric sclerae, PERRL. Data Review:    Review and/or order of clinical lab test  Review and/or order of tests in the radiology section of CPT  Review and/or order of tests in the medicine section of CPT    Ct Head Wo Cont    Result Date: 7/17/2020  IMPRESSION: 1. No acute intracranial abnormality.  2. Aerated secretions in the left maxillary sinus can be seen with acute sinusitis. Us Retroperitoneum Comp    Result Date: 7/18/2020  IMPRESSION: Medical renal disease. Small left kidney. No evidence for hydronephrosis or mass lesion. Labs:     Recent Labs     07/19/20  1055 07/18/20  0448   WBC 5.2 5.6   HGB 7.8* 7.8*   HCT 26.2* 25.1*    208     Recent Labs     07/19/20  0438 07/18/20  0448 07/17/20  1151     139 136 135*   K 4.1  4.2 4.0 4.5     107 104 101   CO2 24  24 25 25   BUN 27*  26* 30* 33*   CREA 1.88*  1.86* 1.99* 2.09*   *  104* 98 120*   CA 8.4*  8.2* 8.7 8.5   MG  --  2.7*  --    PHOS  --  4.1  --      Recent Labs     07/19/20  0438 07/18/20  0448 07/17/20  1151   ALT 11*  10* 14 14   AP 99  100 115 124*   TBILI 0.5  0.5 0.6 0.4   TP 6.9  6.5 7.4 7.8   ALB 2.6*  2.7* 2.6* 3.0*   GLOB 4.3*  3.8 4.8* 4.8*     Recent Labs     07/17/20  1151   INR 1.1   PTP 11.5*      Recent Labs     07/18/20 0448   TIBC 183*   PSAT 10*   FERR 222      Lab Results   Component Value Date/Time    Folate 16.5 01/16/2020 04:57 AM      No results for input(s): PH, PCO2, PO2 in the last 72 hours. No results for input(s): CPK, CKNDX, TROIQ in the last 72 hours.     No lab exists for component: CPKMB  Lab Results   Component Value Date/Time    Cholesterol, total 90 10/26/2019 04:09 AM    HDL Cholesterol 21 10/26/2019 04:09 AM    LDL, calculated 56.2 10/26/2019 04:09 AM    Triglyceride 64 10/26/2019 04:09 AM    CHOL/HDL Ratio 4.3 10/26/2019 04:09 AM     Lab Results   Component Value Date/Time    Glucose (POC) 119 (H) 07/18/2020 09:42 PM    Glucose (POC) 124 (H) 07/18/2020 04:45 PM    Glucose (POC) 116 (H) 07/18/2020 11:33 AM    Glucose (POC) 109 (H) 07/18/2020 06:13 AM    Glucose (POC) 99 01/27/2020 11:11 AM     Lab Results   Component Value Date/Time    Color DARK YELLOW 07/17/2020 11:51 AM    Appearance CLOUDY (A) 07/17/2020 11:51 AM    Specific gravity 1.016 07/17/2020 11:51 AM    Specific gravity 1.015 10/31/2019 11:00 AM    pH (UA) 5.0 07/17/2020 11:51 AM    Protein 100 (A) 07/17/2020 11:51 AM    Glucose Negative 07/17/2020 11:51 AM    Ketone Negative 07/17/2020 11:51 AM    Bilirubin Negative 07/17/2020 11:51 AM    Urobilinogen 0.2 07/17/2020 11:51 AM    Nitrites Negative 07/17/2020 11:51 AM    Leukocyte Esterase MODERATE (A) 07/17/2020 11:51 AM    Epithelial cells FEW 07/17/2020 11:51 AM    Bacteria 1+ (A) 07/17/2020 11:51 AM    WBC 10-20 07/17/2020 11:51 AM    RBC >100 (H) 07/17/2020 11:51 AM         Medications Reviewed:     Current Facility-Administered Medications   Medication Dose Route Frequency    albumin human 5% (BUMINATE) solution 12.5 g  12.5 g IntraVENous DAILY    iron sucrose (VENOFER) 300 mg in 0.9% sodium chloride 250 mL IVPB  300 mg IntraVENous Q24H    sodium chloride (NS) flush 5-40 mL  5-40 mL IntraVENous Q8H    sodium chloride (NS) flush 5-40 mL  5-40 mL IntraVENous PRN    acetaminophen (TYLENOL) tablet 650 mg  650 mg Oral Q4H PRN    diphenhydrAMINE (BENADRYL) injection 12.5 mg  12.5 mg IntraVENous Q4H PRN    ondansetron (ZOFRAN) injection 4 mg  4 mg IntraVENous Q4H PRN    bisacodyL (DULCOLAX) tablet 5 mg  5 mg Oral DAILY PRN    [Held by provider] digoxin (LANOXIN) tablet 0.125 mg  0.125 mg Oral EVERY OTHER DAY    finasteride (PROSCAR) tablet 5 mg  5 mg Oral DAILY    lactobac ac& pc-s.therm-b.anim (TIAN Q/RISAQUAD)  1 Cap Oral DAILY    levETIRAcetam (KEPPRA) tablet 250 mg  250 mg Oral BID    magnesium oxide (MAG-OX) tablet 800 mg  800 mg Oral BID    . PHARMACY TO SUBSTITUTE PER PROTOCOL (Reordered from: tadalafiL (CIALIS) 2.5 mg tablet)    Per Protocol    tamsulosin (FLOMAX) capsule 0.8 mg  0.8 mg Oral QHS    venlafaxine-SR (EFFEXOR-XR) capsule 150 mg  150 mg Oral DAILY WITH BREAKFAST    piperacillin-tazobactam (ZOSYN) 3.375 g in 0.9% sodium chloride (MBP/ADV) 100 mL  3.375 g IntraVENous Q8H     ______________________________________________________________________  EXPECTED LENGTH OF STAY: - - -  ACTUAL LENGTH OF STAY:          0                 Nerissa Garcia MD

## 2020-07-19 NOTE — PROGRESS NOTES
Voiding with some frequency. I&O cath yesterday for 500 ml  Now with PVR's around 300 ml  7/17 U cx NEG  REC:  PVR's prn with I&O cath > 500 ml.   O/w continue same and reconsult prn

## 2020-07-19 NOTE — PROGRESS NOTES
[facility] VAD/Heart Failure Progress Note    Admit Date: 2020  POD:  * No surgery found *    Procedure:          Subjective:   Mild dyspnea, fatigue, and weakness; denies chest pain; confusion better     Objective:   Vitals:  Blood pressure 115/80, pulse 86, temperature 98.4 °F (36.9 °C), resp. rate 18, height 6' 2\" (1.88 m), weight 177 lb 4 oz (80.4 kg), SpO2 97 %. Temp (24hrs), Av.3 °F (36.8 °C), Min:98.1 °F (36.7 °C), Max:98.5 °F (36.9 °C)    Hemodynamics:   CO:     CI:     CVP:     SVR:     PAP Systolic:     PAP Diastolic:     PVR:     UB07:     SCV02:      VAD Interrogation: LVAD (Heartmate)  Pump Speed (RPM): 5800  Pump Flow (LPM): 4.7  PI (Pulsitility Index): 3.7  Power: 4.4  MAP: 86   Test: Yes  Back Up  at Bedside & Labeled: Yes  Power Module Test: Yes  Driveline Site Care: No  Driveline Dressing: Clean, Dry, and Intact    EKG/Rhythm:      Extubation Date / Time:     CT Output:     Ventilator:       Oxygen Therapy:  Oxygen Therapy  O2 Sat (%): 97 % (20)  Pulse via Oximetry: 88 beats per minute (20)  O2 Device: Room air (20)    CXR:    Admission Weight: Last Weight   Weight: 175 lb 0.7 oz (79.4 kg) Weight: 177 lb 4 oz (80.4 kg)     Intake / Output / Drain:  Current Shift: 701 -  190  In: 610 [P.O.:360; I.V.:250]  Out: 175 [Urine:175]  Last 24 hrs.:     Intake/Output Summary (Last 24 hours) at 2020 1215  Last data filed at 2020 1015  Gross per 24 hour   Intake 1860 ml   Output 1375 ml   Net 485 ml           No results for input(s): CPK, CKMB, TROIQ in the last 72 hours.   Recent Labs     20  1055 20  0438 20  0448 20  1151   NA  --  138  139 136 135*   K  --  4.1  4.2 4.0 4.5   CO2  --  24  24 25 25   BUN  --  27*  26* 30* 33*   CREA  --  1.88*  1.86* 1.99* 2.09*   GLU  --  107*  104* 98 120*   PHOS  --   --  4.1  --    MG  --   --  2.7*  --    WBC 5.2  --  5.6 5.3 HGB 7.8*  --  7.8* 7.9*   HCT 26.2*  --  25.1* 25.1*     --  208 174     Recent Labs     07/17/20  1151   INR 1.1   PTP 11.5*     No lab exists for component: PBNP        Current Facility-Administered Medications:     albumin human 5% (BUMINATE) solution 12.5 g, 12.5 g, IntraVENous, DAILY, Kylie Khan MD, 12.5 g at 07/19/20 0836    iron sucrose (VENOFER) 300 mg in 0.9% sodium chloride 250 mL IVPB, 300 mg, IntraVENous, Q24H, Wang Reed MD, Last Rate: 176.7 mL/hr at 07/18/20 1714, 300 mg at 07/18/20 1714    sodium chloride (NS) flush 5-40 mL, 5-40 mL, IntraVENous, Q8H, Hyun Zhao, DO, 10 mL at 07/19/20 0718    sodium chloride (NS) flush 5-40 mL, 5-40 mL, IntraVENous, PRN, Omer Castillo, [employer], DO    acetaminophen (TYLENOL) tablet 650 mg, 650 mg, Oral, Q4H PRN, Hyun Hartmann, DO    diphenhydrAMINE (BENADRYL) injection 12.5 mg, 12.5 mg, IntraVENous, Q4H PRN, Chandlereth Jonathan, [employer], DO    ondansetron [facility]) injection 4 mg, 4 mg, IntraVENous, Q4H PRN, Omer Castillo, [employer], DO    bisacodyL (DULCOLAX) tablet 5 mg, 5 mg, Oral, DAILY PRN, Omer Castillo, [employer], DO    [Held by provider] digoxin (LANOXIN) tablet 0.125 mg, 0.125 mg, Oral, EVERY OTHER DAY, Hyun Zhao, DO    finasteride (PROSCAR) tablet 5 mg, 5 mg, Oral, DAILY, Hyun Zhao, , 5 mg at 07/19/20 2007    lactobac ac& pc-s.therm-b.anim (TIAN Q/RISAQUAD), 1 Cap, Oral, DAILY, Hyun Zhao DO, 1 Cap at 07/19/20 0836    levETIRAcetam (KEPPRA) tablet 250 mg, 250 mg, Oral, BID, Hyun Zhao DO, 250 mg at 07/19/20 0836    magnesium oxide (MAG-OX) tablet 800 mg, 800 mg, Oral, BID, Hyun Zhao, , 800 mg at 07/19/20 0836    . PHARMACY TO SUBSTITUTE PER PROTOCOL (Reordered from: tadalafiL (CIALIS) 2.5 mg tablet), , , Per Protocol, Nicolasa FRENCH DO    [facility]) capsule 0.8 mg, 0.8 mg, Oral, QHS, Hyun Zhao, , 0.8 mg at 07/18/20 2115    venlafaxine-SR (EFFEXOR-XR) capsule 150 mg, 150 mg, Oral, DAILY WITH BREAKFAST, Thomas FRENCH, DO, 150 mg at 07/19/20 0836    piperacillin-tazobactam (ZOSYN) 3.375 g in 0.9% sodium chloride (MBP/ADV) 100 mL, 3.375 g, IntraVENous, Q8H, Hyun Zhao, DO, Last Rate: 25 mL/hr at 07/19/20 0422, 3.375 g at 07/19/20 0422    A/P  S/P LVAD - good flows  Need for anticoagulation - on hodl due to recurrent bleeds  UTI - abx's  Confusion - monitor     Risk of morbidity and mortality - high  Medical decision making - high complexity       Signed By: Leo Connolly MD

## 2020-07-19 NOTE — PROGRESS NOTES
[address] [facility] in [place], [place]  Inpatient Progress Note      Patient name: Alen Lopez  Patient : 1950  Patient MRN: [id number]  Attending MD: Silas Pace MD  Date of service: 20    REASON FOR CONSULT:  UTI in patient with LVAD     PLAN:  Continue current device speed at 5,800rpm  Cannot tolerate beta-blockers d/t severe RV failure  Cannot tolerate ACE/ARB/ARNi due to persistent orthostasis despite increased fluid intake   Cannot tolerate spironolactone due to IVVD and hyponatremia  Hold digoxin due to level 1.6 now 1.1; toxicity possibly contributing to auditory hallucinations  Monitor CardioMEMs readings, 13 on  - reassess on Monday  No diuretics due to acute renal failure; appears dry  LVEDD > 7cm; consider RHC to determine device speed  Unable to tolerate anticoagulation due to hematuria; awaiting LDH off aspirin  Venofer infusion 200mg IV x once, check hemoccult  Gentamicin to exit site with daily dressing changes  Nephrology consult appreciated, bladder and renal US pending  Albumin IV  Continue keppra, check level  Urology consult appreciated; cystoscopy outpatient  Antibiotics per ID consult  Hospitalist assistance apprecaited  DNR  Note: may need pneumonia vaccine prior to discharge     IMPRESSION:  UTI  Metabolic encephalopathy  Chronic systolic heart failure  Stage D, NYHA class II symptoms  Coronary artery disease  Ischemic cardiomyopathy, LVEF 15%  S/p HM3 LVAD as destination therapy (19 by Dr. Courtney Lynn)  S/p Impella 5.0 as bridge to LVAD  HTN, goal MAP 70-90mmHg  H/o VT s/p [place] BIV-ICD  PAF  H/o recurrent UTI, pseudomonal infection  Unable to tolerate AC due to hematuria  MSSA drive line infection  COPD  JOSE  Essential tremor on keppra  Depression on effexor  Persistently elevated CEA PET scan increased RML activity, not malignant per hemonc  Orthostatic hypotension  DNR     CARDIAC IMAGING:  TTE  shows large LVIDd, 6.84 cm, RVIDd 3.06 cm, TAPSE 1.15 cm, severely elevated CVP  TTE 7/6/20- Mild AI, LViDd 6.91cm, RVIDd 5.16cm, TAPSE 1.39cm  Echo (7/18/20)  · Contrast used: DEFINITY. · Image quality for this study was technically difficult. · Severely dilated left ventricle. Wall thickness appears thin. Severe systolic function. Estimated left ventricular ejection fraction is <15%. Visually measured ejection fraction. · Moderately dilated left atrium. · Moderately dilated right ventricle. Moderately to severely reduced systolic function. · Dilated right atrium. · Moderate mitral valve prolapse.     INTERVAL HISTORY:  No issues overnight  MAPs at goal  HR 80s  I/O net recorded  HGB stable 7.8  Creatinine 1.88  Iron sat 10%    LVAD INTERROGATION:  Device interrogated in person  Device function normal, normal flow, no events  LVAD   Pump Speed (RPM): 8500  Pump Flow (LPM): 4.8  MAP: 78  PI (Pulsitility Index): 3.5  Power: 4.4   Test: No  Back Up  at Bedside & Labeled: Yes  Power Module Test: No  Driveline Site Care: No  Driveline Dressing: Clean, Dry, and Intact  Outpatient: No  MAP in Therapeutic Range (Outpatient): Yes  Testing  Alarms Reviewed: Yes  Back up SC speed: 5800  Back up Low Speed Limit: 5400  Emergency Equipment with Patient?: No  Emergency procedures reviewed?: Yes  Drive line site inspected?: (BETTE d/t donavan)  Drive line intergrity inspected?: Yes  Drive line dressing changed?: No    PHYSICAL EXAM:  Visit Vitals  /80   Pulse 89   Temp 99.2 °F (37.3 °C)   Resp 18   Ht 6' 2\" (1.88 m)   Wt 177 lb 4 oz (80.4 kg)   SpO2 99%   BMI 22.76 kg/m²     General: Patient is well developed, well-nourished in no acute distress  HEENT: Normocephalic and atraumatic. No scleral icterus. Pupils are equal, round and reactive to light and accomodation. No conjunctival injection. Oropharynx is clear. Neck: Supple. No evidence of thyroid enlargements or lymphadenopathy.   JVD: Cannot be appreciated   Lungs: Breath sounds are equal and clear bilaterally. No wheezes, rhonchi, or rales. Heart: Regular rate and rhythm with normal S1 and S2. No murmurs, gallops or rubs. Abdomen: Soft, no mass or tenderness. No organomegaly or hernia. Bowel sounds present. Genitourinary and rectal: deferred  Extremities: No cyanosis, clubbing, or edema. Neurologic: No focal sensory or motor deficits are noted. Grossly intact. Psychiatric: Awake, alert an doriented x 3. Appropriate mood and affect. Skin: Warm, dry and well perfused. No lesions, nodules or rashes are noted. REVIEW OF SYSTEMS:  General: Denies fever, night sweats. Ear, nose and throat: Denies difficulty hearing, sinus problems, runny nose, post-nasal drip, ringing in ears, mouth sores, loose teeth, ear pain, nosebleeds, sore throate, facial pain or numbess  Cardiovascular: see above in the interval history  Respiratory: Denies cough, wheezing, sputum production, hemoptysis.   Gastrointestinal: Denies heartburn, constipation, intolerance to certain foods, diarrhea, abdominal pain, nausea, vomiting, difficulty swallowing, blood in stool  Kidney and bladder: Denies painful urination, frequent urination, urgency, prostate problems and impotence  Musculoskeletal: Denies joint pain, muscle weakness  Skin and hair: Denies change in existing skin lesions, hair loss or increase, breast changes    PAST MEDICAL HISTORY:  Past Medical History:   Diagnosis Date    BPH (benign prostatic hyperplasia)     CHF (congestive heart failure) (HCC)     Degenerative disc disease, lumbar     Heart failure (HCC)     High cholesterol     Hypertension     Ischemic cardiomyopathy     LVAD (left ventricular assist device) present ([address]     Paroxysmal atrial fibrillation ([address] 4/2/2019    Spinal stenosis        PAST SURGICAL HISTORY:  Past Surgical History:   Procedure Laterality Date    COLONOSCOPY Left 11/11/2019    COLON[facility]COPY at bedside performed by [facility] MD Lindsey at [facility]COPY    HX APPENDECTOMY      HX CORONARY ARTERY BYPASS GRAFT      triple    HX HEART ASSIST DEVICE Left 10/29/2019    Impella 5.0    HX HEART ASSIST DEVICE Left 2019    HeartMate 3    HX HERNIA REPAIR      HX IMPLANTABLE CARDIOVERTER DEFIBRILLATOR      HX THROMBECTOMY Left 2019    Left brachial thrombectomy    PA CARDIOVERSION ELECTIVE ARRHYTHMIA EXTERNAL N/A 6/10/2019    EP CARDIOVERSION performed by Shreyas Harrison MD at [address] CATH LAB    PA CARDIOVERSION ELECTIVE ARRHYTHMIA EXTERNAL N/A 2019    EP CARDIOVERSION performed by Majo Wetzel MD at [address] CATH LAB    PA INSJ/RPLCMT PERM DFB W/TRNSVNS LDS 1/DUAL CHMBR N/A 2019    INSERT ICD BIV MULTI performed by Hernan Stallworth MD at [address] CATH LAB    PA TCAT IMPL WRLS P-ART PRS SNR L-T HEMODYN MNTR N/A 2019    IMPLANT HEART FAILURE MONITORING DEVICE performed by Jaimee Lazcano MD at [address] CATH LAB       FAMILY HISTORY:  Family History   Problem Relation Age of Onset    Lung Disease Mother     Hypertension Mother     Arthritis-osteo Mother     Heart Disease Mother     Heart Disease Father     Diabetes Father        SOCIAL HISTORY:  Social History     Socioeconomic History    Marital status:      Spouse name: Not on file    Number of children: Not on file    Years of education: Not on file    Highest education level: Not on file   Tobacco Use    Smoking status: Former Smoker     Last attempt to quit: 2010     Years since quittin.6    Smokeless tobacco: Never Used   Substance and Sexual Activity    Alcohol use: Not Currently     Comment: rarely    Drug use: Never   Other Topics Concern       LABORATORY RESULTS:     Labs Latest Ref Rng & Units 2020   WBC 4.1 - 11.1 K/uL 5.2 - - 5.6 5.3 5.8 3.8(L)   RBC 4.10 - 5.70 M/uL 2.69(L) - - 2.60(L) 2.59(L) 2.60(LL) 2.85(L)   Hemoglobin 12.1 - 17.0 g/dL 7. 8(L) - - 7. 8(L) 7. 9(L) 7. 9(L) 8.8(L)   Hematocrit 36.6 - 50.3 % 26. 2(L) - - 25. 1(L) 25. 1(L) 24. 7(L) 28. 1(L)   MCV 80.0 - 99.0 FL 97.4 - - 96.5 96.9 95 98.6   Platelets 521 - 430 K/uL 186 - - 208 174 203 153   Lymphocytes 12 - 49 % 11(L) - - - 8(L) - -   Monocytes 5 - 13 % 10 - - - 9 - -   Eosinophils 0 - 7 % 2 - - - 1 - -   Basophils 0 - 1 % 1 - - - 0 - -   Albumin 3.5 - 5.0 g/dL - 2. 6(L) 2. 7(L) 2. 6(L) 3.0(L) 3. 3(L) 2. 5(L)   Calcium 8.5 - 10.1 MG/DL - 8.4(L) 8.2(L) 8.7 8.5 8.6 8. 1(L)   Glucose 65 - 100 mg/dL - 107(H) 104(H) 98 120(H) 85 96   BUN 6 - 20 MG/DL - 27(H) 26(H) 30(H) 33(H) 24 18   Creatinine 0.70 - 1.30 MG/DL - 1.88(H) 1.86(H) 1.99(H) 2.09(H) 1.42(H) 1.18   Sodium 136 - 145 mmol/L - 138 139 136 135(L) 135 135(L)   Potassium 3.5 - 5.1 mmol/L - 4.1 4.2 4.0 4.5 4.4 4.1   TSH 0.36 - 3.74 uIU/mL - - - 1.13 - - -   PSA 0.01 - 4.0 ng/mL - - - - - - -   LDH 85 - 241 U/L - - - - - - 204   Some recent data might be hidden     Lab Results   Component Value Date/Time    TSH 1.13 07/18/2020 11:28 AM    TSH 1.70 04/21/2020 01:59 PM    TSH 2.30 12/03/2019 03:29 AM    TSH 2.40 10/25/2019 07:39 PM    TSH 2.45 06/01/2019 04:16 AM       ALLERGY:  Allergies   Allergen Reactions    Cefepime Other (comments)     myoclonus        CURRENT MEDICATIONS:    Current Facility-Administered Medications:     albumin human 5% (BUMINATE) solution 12.5 g, 12.5 g, IntraVENous, DAILY, Heena Fritz MD, 12.5 g at 07/19/20 0836    iron sucrose (VENOFER) 300 mg in 0.9% sodium chloride 250 mL IVPB, 300 mg, IntraVENous, Q24H, Cecile NARVAEZ MD, Last Rate: 176.7 mL/hr at 07/18/20 1714, 300 mg at 07/18/20 1714    sodium chloride (NS) flush 5-40 mL, 5-40 mL, IntraVENous, Q8H, Hyun Zhao, DO, 10 mL at 07/19/20 1418    sodium chloride (NS) flush 5-40 mL, 5-40 mL, IntraVENous, PRN, Hyun Lan, DO    acetaminophen (TYLENOL) tablet 650 mg, 650 mg, Oral, Q4H PRN, Hyun Lan M, DO    diphenhydrAMINE (BENADRYL) injection 12.5 mg, 12.5 mg, IntraVENous, Q4H PRN, Susie Gold [employer] M, DO    ondansetron [facility]) injection 4 mg, 4 mg, IntraVENous, Q4H PRN, Richy Merida M, DO    bisacodyL (DULCOLAX) tablet 5 mg, 5 mg, Oral, DAILY PRN, Susie [employer] M, DO    [Held by provider] digoxin (LANOXIN) tablet 0.125 mg, 0.125 mg, Oral, EVERY OTHER DAY, Zhao, Hyun M, DO    finasteride (PROSCAR) tablet 5 mg, 5 mg, Oral, DAILY, ZhaoHyun curran M, DO, 5 mg at 07/19/20 6056    lactobac ac& pc-s.therm-b.anim (TIAN Q/RISAQUAD), 1 Cap, Oral, DAILY, Hyun Zhao, DO, 1 Cap at 07/19/20 1297    levETIRAcetam (KEPPRA) tablet 250 mg, 250 mg, Oral, BID, Hyun Zhao, DO, 250 mg at 07/19/20 0836    magnesium oxide (MAG-OX) tablet 800 mg, 800 mg, Oral, BID, Richy FRENCH, DO, 800 mg at 07/19/20 0836    . PHARMACY TO SUBSTITUTE PER PROTOCOL (Reordered from: tadalafiL (CIALIS) 2.5 mg tablet), , , Per Protocol, Susie Gold [employer] M, DO    tamsulosin [facility]) capsule 0.8 mg, 0.8 mg, Oral, QHS, Hyun Zhao, DO, 0.8 mg at 07/18/20 2115    venlafaxine-SR (EFFEXOR-XR) capsule 150 mg, 150 mg, Oral, DAILY WITH BREAKFAST, Hyun Zhao, DO, 150 mg at 07/19/20 0836    piperacillin-tazobactam (ZOSYN) 3.375 g in 0.9% sodium chloride (MBP/ADV) 100 mL, 3.375 g, IntraVENous, Q8H, Hyun Zhao, DO, Last Rate: 25 mL/hr at 07/19/20 1240, 3.375 g at 07/19/20 1240    Thank you for allowing me to participate in this patient's care.     Vivi Rodrigues MD PhD  [address] [place] [address], [address]  Phone: [phone]  Fax: [phone]

## 2020-07-19 NOTE — CONSULTS
Infectious Disease Consult    Today's Date: 7/18/2020   Admit Date: 7/17/2020    Impression:   · LVAD drive line infection  · History of Pseudomonas bacteremia  · AMS  · GNR from blood cultures     Plan:   · Continue current therapy  · Follow up cultures and studies  · Adjust antibiotic therapy prn    Anti-infectives:   · Zosyn    Subjective:   Date of Consultation:  July 18, 2020  Referring Physician: Dr Sanjuanita Toscano    Patient is a 71 y.o. male known to me from care given in the past. He has a history of Pseudomonas bacteremia and presumed infected LVAD drive line. He is admitted with weakness and confusion and has been found to have GNR in multiple blood cultures. He is on IV antibiotic therapy and we are asked to see him in consultation.      Patient Active Problem List   Diagnosis Code    Paroxysmal atrial fibrillation ([facility]) I48.0    Acute on chronic systolic CHF (congestive heart failure) ([facility]) E88.87    Systolic CHF, chronic ([facility]) I50.22    Peripheral vascular disease ([facility]) I73.9    Acute decompensated heart failure ([facility]) I50.9    Tremor R25.1    Chronic anticoagulation Z79.01    LVAD (left ventricular assist device) present ([address] Z95.811    Sepsis ([address] A41.9    Anemia D64.9    Hematuria R31.9    Acute encephalopathy G93.40     Past Medical History:   Diagnosis Date    BPH (benign prostatic hyperplasia)     CHF (congestive heart failure) ([facility])     Degenerative disc disease, lumbar     Heart failure ([facility])     High cholesterol     Hypertension     Ischemic cardiomyopathy     LVAD (left ventricular assist device) present ([address]     Paroxysmal atrial fibrillation ([address] 4/2/2019    Spinal stenosis       Family History   Problem Relation Age of Onset    Lung Disease Mother     Hypertension Mother     Arthritis-osteo Mother     Heart Disease Mother     Heart Disease Father     Diabetes Father       Social History     Tobacco Use    Smoking status: Former Smoker     Last attempt to quit: 2010     Years since quittin.6    Smokeless tobacco: Never Used   Substance Use Topics    Alcohol use: Not Currently     Comment: rarely     Past Surgical History:   Procedure Laterality Date    COLONOSCOPY Left 2019    COLONOSCOPY at bedside [facility]ki [facility]COPY    HX APPENDECTOMY      HX CORONARY ARTERY BYPASS GRAFT      triple    HX HEART ASSIST DEVICE Left 10/29/2019    Impella 5.0    HX HEART ASSIST DEVICE Left 2019    HeartMate 3    HX HERNIA REPAIR      HX IMPLANTABLE CARDIOVERTER DEFIBRILLATOR      HX THROMBECTOMY Left 2019    Left brachial thrombectomy    IA CARDIOVERSION ELECTIVE ARRHYTHMIA EXTERNAL N/A 6/10/2019    EP CARDIOVERSION performed by Marylou Shaffer MD at [place] [address] [address] CATH LAB    IA CARDIOVERSION ELECTIVE ARRHYTHMIA EXTERNAL N/A 2019    EP CARDIOVERSION performed by Jenifer Gonsalez MD at [place] [address], [facility] [address] CATH LAB    IA INSJ/RPLCMT PERM DFB W/TRNSVNS LDS 1/DUAL CHMBR N/A 2019    INSERT ICD BIV MULTI performed by Nell Sosa MD at [place] [address] CATH LAB    IA TCAT IMPL WRLS P-ART PRS SNR L-T HEMODYN MNTR N/A 2019    IMPLANT HEART FAILURE MONITORING DEVICE performed by Link Millard MD at [place] [address], [facility] [address] CATH LAB      Prior to Admission medications    Medication Sig Start Date End Date Taking? Authorizing Provider   budesonide (PULMICORT) 0.5 mg/2 mL nbsp 500 mcg by Nebulization route nightly. Yes Provider, Historical   tadalafiL (CIALIS) 2.5 mg tablet Take 2.5 mg by mouth nightly. Yes Provider, Historical   digoxin (LANOXIN) 0.125 mg tablet Take 1 Tab by mouth every other day. 20  Yes Levi, Shana B, NP   magnesium oxide (MAG-OX) 400 mg tablet Take 2 Tabs by mouth two (2) times a day. 20  Yes Levi, Sohail [place] B, NP   ciprofloxacin HCl (CIPRO) 750 mg tablet Take 1 Tab by mouth two (2) times a day.  20  Yes Levi, Shana B, NP   tamsulosin (Flomax) 0.4 mg capsule Take 0.8 mg by mouth nightly. Yes Provider, Historical   gentamicin (GARAMYCIN) 0.1 % topical ointment Apply to exit site daily. 3/16/20  Yes Norman Garber NP   venlafaxine-SR [facility] 150 mg capsule Take 1 Cap by mouth daily (with breakfast). 3/9/20  Yes Rafael Herrera NP   levETIRAcetam (KEPPRA) 250 mg tablet Take 1 Tab by mouth two (2) times a day. 3/9/20  Yes Norman Garber NP   L. acidoph & paracasei- S therm- Bifido (TIAN-Q/RISAQUAD) 8 billion cell cap cap Take 1 Cap by mouth daily. 3/9/20  Yes Norman Garber NP   finasteride (PROSCAR) 5 mg tablet Take 1 Tab by mouth daily. 3/9/20  Yes Norman Garber NP   albuterol (PROVENTIL HFA, VENTOLIN HFA, PROAIR HFA) 90 mcg/actuation inhaler Take 2 Puffs by inhalation every six (6) hours as needed for Wheezing. 20  Yes Harlan Sims NP       Allergies   Allergen Reactions    Cefepime Other (comments)     myoclonus        Review of Systems:  Review of systems not obtained due to patient factors. Objective:     Visit Vitals  /80   Pulse 85   Temp 98.1 °F (36.7 °C)   Resp 20   Ht 6' 2\" (1.88 m)   Wt 79.8 kg (176 lb)   SpO2 100%   BMI 22.60 kg/m²     Temp (24hrs), Av.5 °F (36.9 °C), Min:98.1 °F (36.7 °C), Max:99.1 °F (37.3 °C)       Lines:  Peripheral IV:       Physical Exam:  Lungs:  clear to auscultation bilaterally  Heart:  regular rate and rhythm  Abdomen:  soft, non-tender.  Bowel sounds normal. No masses,  no organomegaly  Skin:  no rash or abnormalities    Data Review:     CBC:  Recent Labs     20  0448 20  1151   WBC 5.6 5.3   GRANS  --  82*   MONOS  --  9   EOS  --  1   ANEU  --  4.3   ABL  --  0.4*   HGB 7.8* 7.9*   HCT 25.1* 25.1*    174       BMP:  Recent Labs     20  0448 20  1151   CREA 1.99* 2.09*   BUN 30* 33*    135*   K 4.0 4.5    101   CO2 25 25   AGAP 7 9   GLU 98 120*       LFTS:  Recent Labs     20  0448 20  1151   TBILI 0.6 0.4   ALT 14 14    124*   TP 7.4 7.8 ALB 2.6* 3.0*       Microbiology:     All Micro Results     Procedure Component Value Units Date/Time    CULTURE, BLOOD, PAIRED [[id number]]  (Abnormal) Collected:  07/17/20 1859    Order Status:  Completed Specimen:  Blood Updated:  07/18/20 1810     Special Requests: NO SPECIAL REQUESTS        Culture result:       GRAM NEGATIVE RODS GROWING IN 2 OF 4 BOTTLES DRAWN (SITE = R AC AND L AC)                  PRELIMINARY REPORT OF GRAM NEGATIVE RODS GROWING IN 2 OF 4 BOTTLES DRAWN CALLED TO AND READ BACK BY  MS VICKI WILKES AT 1805 ON 7/18/20. PJ          CULTURE, URINE [[id number]] Collected:  07/17/20 1151    Order Status:  Completed Specimen:  Urine from Clean catch Updated:  07/18/20 1155     Special Requests: NO SPECIAL REQUESTS        Culture result:       No significant growth, <10,000 CFU/mL          CULTURE, URINE [[id number]]     Order Status:  Canceled Specimen:  Urine from Clean catch     URINE CULTURE HOLD SAMPLE [[id number]] Collected:  07/17/20 1151    Order Status:  Completed Specimen:  Urine from Serum Updated:  07/17/20 1204     Urine culture hold       Urine on hold in Microbiology dept for 2 days. If unpreserved urine is submitted, it cannot be used for addtional testing after 24 hours, recollection will be required.                 Imaging:   Reviewed     Signed By: Gardenia Driscoll MD     July 18, 2020

## 2020-07-19 NOTE — PROGRESS NOTES
1930 Bedside and Verbal shift change report given to Landon King, RN and Ozzie Phillip, RN (oncoming nurse) by KATRIN Aguirre (offgoing nurse). Report included the following information SBAR, Kardex, Intake/Output, MAR, Accordion, Recent Results and Cardiac Rhythm NSR, paced beats. 0543 Post void residuals taken throughout shift per orders; see chart. Will continue to monitor. 1936 Bedside and Verbal shift change report given to Carla (oncoming nurse) by Cookie (offgoing nurse). Report included the following information SBAR, Kardex and Intake/Output. Problem: Falls - Risk of  Goal: *Absence of Falls  Description: Document [facility] Fall Risk and appropriate interventions in the flowsheet. Outcome: Progressing Towards Goal  Note: Fall Risk Interventions:  Mobility Interventions: Communicate number of staff needed for ambulation/transfer, PT Consult for mobility concerns, PT Consult for assist device competence, Strengthening exercises (ROM-active/passive), Utilize walker, cane, or other assistive device         Medication Interventions: Teach patient to arise slowly, Patient to call before getting OOB, Evaluate medications/consider consulting pharmacy    Elimination Interventions:  Toilet paper/wipes in reach, Toileting schedule/hourly rounds, Urinal in reach

## 2020-07-20 NOTE — PROGRESS NOTES
Patient: Khoa Portillo MRN: [id number]  SSN: [id number]    YOB: 1950  Age: 71 y.o. Sex: male        ADMITTED: 7/17/2020 to Louis Ratliff MD by [address] [facility] for Acute encephalopathy [G93.40]  POD# * No surgery found *     Seen by Dr. Papa Nice over the weekend for Hematuria, BPH, incontinence, recurrent UTIs. Patient presented to hospital d/t progressively worsening confusion. Significant cardiac history. He is currently on an LVAD with a history of chronic systolic heart failure, cardiomyopathy, EF of 15%, coronary artery disease, cardiac valve replacement, intolerant of anticoagulants due to hematuria and who has had urinary retention, urinary tract infections as well as hematuria. This is a known patient of Dr. Zane Zimmer. Last OV note below from Use of flomax and finasteride with known BPH. 07/06/20 CT demonstrated no urologic pathology other than the expected circumferential bladder thickening with some renal scarring noted as well. Patient reports intermittent gross hematuria with no clots. No UA to assess. States his biggest complaint is nocturia, sometimes going q15 minutes. Last night he only went once through the evening. Feels as though he is completely emptying his bladder. Patient last had a cystoscopy on 10/25/19 with Dr. Mykel Frias. Postoperative dx reports catheter related cystitis changes of the posterior wall of the bladder but no suggestion of tumor or anything worrisome. There was no active bleeding. See full op note below. afvss  WBC: wnl  Bcx: GNR in 2/4 bottles  Hgb: 7.8  Cr: 1.91  UA: 10-20 WBCs, >100 RBCs, 1+ bacteria  Ucx: no growth  +zosyn  +0.8mg flomax, finasteride    Renal US: IMPRESSION: Medical renal disease. Small left kidney. No evidence for  hydronephrosis or mass lesion. 07/06/20  CT abd/pelv wwo: IMPRESSION:     1. Findings consistent with acute cystitis. Bladder is partially decompressed  with Lizama catheter in place.   2. No evidence of urolithiasis. No evidence of solid renal, ureteral or bladder  mass. Multifocal cortical scarring throughout the left kidney, and cortical  scarring in the lower pole of the right kidney. 3. Mild pulmonary interstitial edema and small bilateral pleural effusions.    ===10/25/19 Op note===    [facility] - OP NOTES  Patient: Emilia Jauregui  : 1950  Date of Service: 10/25/2019 2:14:00 PM    [facility] REPORT    Name:  Dennis Knox  MR#:  [id number]  :  1950  ACCOUNT #:  [de-identified]  DATE OF SERVICE:  2019      PREOPERATIVE DIAGNOSIS:  Gross hematuria. POSTOPERATIVE DIAGNOSIS/FINDINGS:  There were catheter related cystitis changes  of the posterior wall of the bladder but I did not see any suggestion of tumor  or anything worrisome. There was no active bleeding. PROCEDURE PERFORMED:  Cystoscopy. SURGEON:  Alem Carlisle MD    ASSISTANT:  None. ANESTHESIA:  MAC.    COMPLICATIONS:  None. SPECIMENS REMOVED:  None. IMPLANTS:  None. ESTIMATED BLOOD LOSS:  Minimal.    DRAINS:  An 18-Rw[place]an Lizama catheter. INDICATIONS FOR PROCEDURE:  The patient is a 70-year-old gentleman with heart  failure, who is being scheduled for placement of left ventricular assist device. He had significant gross hematuria earlier this hospitalization possibly related  to urinary tract infection. The urine has cleared but cystoscopy was  recommended to exclude any worrisome findings in the bladder to account for the  hematuria and he preferred to do that under anesthesia in case any additional  procedures were needed at the time. He was therefore transferred down from the  CCU for the procedure. DESCRIPTION:  He was identified, as mentioned, escorted from the CCU into the  cystoscopy room. Monitored anesthesia care was administered by the Anesthesia  team, and he was then placed in dorsal lithotomy. His previous catheter was  removed.   He was then prepped and draped in standard fashion. With a 21-Maldivian  sheath and both of 30 and 70-degree lens, a cystoscopy was performed. The  anterior urethra was normal.  The prostate was short and small and did not  appear obstructive, however, there was prominent vasculature throughout the  prostate. The bladder was then examined closely with both lenses. Isolated to  the posterior wall at the site of the catheter was what appeared to be catheter  related cystitis changes. I did not see anything that looked like tumor. The  bladder was moderately trabeculated throughout and there were few small  cellules. The trigone was normal.  Both ureteral orifices were normal.  I did  not see anything that I felt warranted biopsy or any additional procedures. I  removed the cystoscope and passed an 18-Maldivian Lizama catheter. He tolerated  theprocedure well. Findings were conveyed to his wife. Sharee Hayes MD      [id number]  D:  11/13/2019 8:22  T:  11/13/2019 10:38  JOB #:  [id number]    Signed before import by Gladys Briones MD  Filed automatically on 12/10/2019 at 10:15 AM    ________________________________________________________________________    ===Last OV note below form 06/11/20===      Brandee De Paz is a 71year old male who presents today for \"BPH\". He returns for follow-up. Context: He was last seen in 2009 when he had abnormal prostate exam and biopsy was negative at PSA of 0.8. He has a significant cardiac history, history of cardiac arrest V. fib 2011, ischemic cardiomyopathy with EF of 15 to 20%. He is on home IV milrinone. He has chronic renal insufficiency with creatinine of 2.3, partly due to brisk diuresis. I had seen him in October 2019 with weak stream and recurrent UTIs  Location: Prostate  Duration: Several months  Associated symptoms: He denies any postural hypotension. Denies any dizziness. No gross hematuria.     Bladder scan was performed initially and it was about 500 mL.  Started tamsulosin. He has tolerated this very well. Reported his symptoms have improved dramatically. CT of the abdomen and pelvis unremarkable except left kidney was somewhat smaller and scarred. He had declined CIC in October and his bladder scan had improved with medication. he had a complicated medical course requiring hospitalization at [facility]. During that time he also developed gross hematuria due to infection and required CBI and cystoscopy by Dr. Sanford Hammans in November 2019 when no abnormal lesions were found. He stayed in the hospital for close to 3 months and then transferred to [facility] for rehab. During hospital stay he required CIC and condom catheter. Required another admission in May 2020 due to Pseudomonas sepsis. He is currently on ciprofloxacin and Augmentin for prophylaxis which he states he will have to continue for the rest of his life. He is not a heart transplant candidate due to his age and comorbidities    PAST MEDICAL HISTORY:    Allergies: No known allergies. DENIES: Latex, Shellfish, X-Ray Dye, Iodine. Medications: TADALAFIL 2.5 MG ORAL TABLET (TADALAFIL) QHS for BPH; Route: ORAL  DIGOX TABLET (DIGOXIN TABS) as directed; Route: ORAL  FINASTERIDE 5 MG ORAL TABLET (FINASTERIDE) 1 po daily; Route: ORAL  COUMADIN 3 MG ORAL TABLET (WARFARIN SODIUM) ONCE DAILY; Route: ORAL  VENLAFAXINE HCL  MG ORAL CAPSULE EXTENDED RELEASE 24 HOUR (VENLAFAXINE HCL) ONCE DAILY; Route: ORAL  FLOMAX 0.4 MG ORAL CAPSULE (TAMSULOSIN HCL) 2 caps  DAILY; Route: ORAL  ENTRESTO 24-26 MG ORAL TABLET (SACUBITRIL-VALSARTAN) TWICE A DAY; Route: ORAL  POTASSIUM CHLORIDE ER 10 MEQ ORAL TABLET EXTENDED RELEASE (POTASSIUM CHLORIDE) ONCE DAILY; Route: ORAL  MAGNESIUM 400 MG ORAL TABLET (MAGNESIUM) Take 2 tabs by mouth 2x a day; Route: ORAL  LEVETIRACETAM 250 MG ORAL TABLET (LEVETIRACETAM) ONCE DAILY; Route: ORAL  HYDRALAZINE HCL 10 MG ORAL TABLET (HYDRALAZINE HCL) ONCE DAILY;  Route: ORAL  FINASTERIDE TABLET (FINASTERIDE TABS) ONCE DAILY; Route: ORAL  CLONAZEPAM 0.5 MG ORAL TABLET (CLONAZEPAM) THREE TIMES A DAY; Route: ORAL  CIPROFLOXACIN  MG ORAL TABLET (CIPROFLOXACIN HCL) TWICE A DAY; Route: ORAL  ATORVASTATIN CALCIUM 40 MG ORAL TABLET (ATORVASTATIN CALCIUM) ONCE DAILY; Route: ORAL  ASPIRIN LOW STRENGTH 81 MG ORAL TABLET CHEWABLE (ASPIRIN) ONCE DAILY; Route: ORAL  ALBUTEROL SULFATE NEBULIZATION SOLUTION (ALBUTEROL SULFATE NEBU) ; Route: INHALATION    Problems: Infection urinary tract, unspecified site (ICD-599.0) (VOQ28-D05.0)  BPH loc w urin obs/LUTS (ICD-600.21) (XLN28-T00.1)  601.1 PROSTATITIS, CHRONIC (ICD-601.1) (HCB86-D88.1)  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 DENIES: Diabetes, Bowel Problems, Stroke/Seizure, Kidney Problems, HIV, Hepatitis, or Cancer. Surgeries: Open Heart Surgery, Hernia Repair, and Vasectomy. Family History: DENIES: Prostate cancer, Kidney cancer, Kidney disease, Kidney stones. Social History: Retired. . Smoking status: former smoker. Does not drink alcohol. System Review: Admits to: Dry Mouth, Leg Swelling, Shortness of Breath, Difficulty Walking, Impaired Sex Drive, and Easy Bleeding. DENIES: Unexplained Weight Loss, Dry Eyes, Constipation, Involuntary Urine Loss, Lower Extremity Weakness, Dry Skin, Psychiatric Problems, Rash.      EXAMINATION: Vitals: BP: 114/82 Weight: 180 lbs Height: 6' 2\" BMI: 23.19 kg/m^2  Gen: alert, awake  Head: Normocephalic, atraumatic  Neck: Supple  Chest: Unlabored breathing  Neuro: Nonfocal  : His phallus length is good for condom catheter       URINALYSIS from Voided specimen  Urine Dip: pH: 6.0, Bld: +++, LE: +, Nit: Neg, Prot: ++++, Ket: Neg, Gluc: Neg  Urine Micro: WBC: 0, RBC: 6-10, Bacteria: 0    POST-VOID RESIDUAL  US PVR (06/11/2020):  381 ccs     Prescription(s) Today: TADALAFIL 2.5 MG ORAL TABLET (TADALAFIL) QHS for BPH  #30[Tablet] x 4,  2020, Raphael Arauz JUSTIN    IMPRESSION:    Lower urinary tract symptoms/BPH: Bladder scan stable at around 380 mL. He does have urinary frequency and weak stream  -He is doing well on maximal medical therapy including Flomax 0.8 mg and finasteride 5 mg. Discussed adding tadalafil 2.5 mg in the morning.  -Takes ciprofloxacin and Augmentin for history of LVAD infection.  -He is refuses to do CIC particularly with his hematuria history and prolonged hospitalization. He is not a candidate for outlet surgery  -We will do a refitting of condom catheter which will help him sleep through the night. - renal ultrasound in 2020 shows no hydronephrosis and his bladder residual has improved to 240 ml    Recurrent UTIs: He had a CT stone protocol which was unremarkable except somewhat atrophic left kidney. He is already on ciprofloxacin and Augmentin for LVAD infection    Hematuria: He had a negative cystoscopy 2019. He denies any gross hematuria. Does have microscopic blood. He takes warfarin      PLAN: Continue Flomax at bedtime 0.8 mg. Continue finasteride. Add tadalafil 2.5 mg in the morning. He is not on nitrates  Condom catheter refitting  Follow-up in 3 to 4 months to reassess    cc: Niraj Jacobo MD  Transcribed by Speech to Text Technology  [employer]'s Services  Bladder Scan, E&M Service, Urinalysis Microscopic        Electronically signed by Omari Devine MD MPH on 2020 at 9:20 AM    ________________________________________________________________________          Vitals: Temp (24hrs), Av.6 °F (37 °C), Min:98.2 °F (36.8 °C), Max:99.2 °F (37.3 °C)    Blood pressure 115/80, pulse 84, temperature 98.2 °F (36.8 °C), resp. rate 16, height 6' 2\" (1.88 m), weight 80.8 kg (178 lb 2.1 oz), SpO2 95 %. Intake and Output:  1901 -  0700  In: 2150 [P.O.:1200; I.V.:950]  Out: 1425 [Urine:1425]  No intake/output data recorded.   Samuel Bowie Output lats 24 hrs: No data found. THERESE Output last 8 hrs: No data found.   BM over last 24 hrs:   Patient Vitals for the past 24 hrs:   Stool Occurrence(s)   07/19/20 1907 1       Labs:  CBC:   Lab Results   Component Value Date/Time    WBC 5.2 07/19/2020 10:55 AM    HCT 26.2 (L) 07/19/2020 10:55 AM    PLATELET 416 78/27/0120 10:55 AM     BMP:   Lab Results   Component Value Date/Time    Glucose 108 (H) 07/20/2020 01:13 AM    Sodium 136 07/20/2020 01:13 AM    Potassium 4.2 07/20/2020 01:13 AM    Chloride 107 07/20/2020 01:13 AM    CO2 25 07/20/2020 01:13 AM    BUN 26 (H) 07/20/2020 01:13 AM    Creatinine 1.91 (H) 07/20/2020 01:13 AM    Calcium 8.0 (L) 07/20/2020 01:13 AM       Assessment/Plan:     Urinary Retention  Hx of BPH  Hematuria  - bladder scan BID, if >500 recommend straight cath  - strict I&Os  - continue flomax and finasteride  - rack urine to reassess in AM  - if ongoing hematuria, recommend outpatient cystoscopy    Signed By: Bradley Acevedo NP - July 20, 2020

## 2020-07-20 NOTE — PROGRESS NOTES
[facility] VAD/Heart Failure Progress Note    Admit Date: 2020  POD:  * No surgery found *    Procedure:          Subjective:   Mild dyspnea, fatigue, and weakness; some drainage from drive-line site      Objective:   Vitals:  Blood pressure 115/80, pulse 90, temperature 98 °F (36.7 °C), resp. rate 20, height 6' 2\" (1.88 m), weight 178 lb 2.1 oz (80.8 kg), SpO2 97 %. Temp (24hrs), Av.4 °F (36.9 °C), Min:98 °F (36.7 °C), Max:99.2 °F (37.3 °C)    Hemodynamics:   CO:     CI:     CVP:     SVR:     PAP Systolic:     PAP Diastolic:     PVR:     LW26:     SCV02:      VAD Interrogation: LVAD (Heartmate)  Pump Speed (RPM): 5800  Pump Flow (LPM): 4.9  Chatter in Lines: No  PI (Pulsitility Index): 3.5  Power: 4.4  MAP: 80   Test: No  Back Up  at Bedside & Labeled: No  Power Module Test: No  Driveline Site Care: No  Driveline Dressing: Clean, Dry, and Intact    EKG/Rhythm:      Extubation Date / Time:     CT Output:     Ventilator:       Oxygen Therapy:  Oxygen Therapy  O2 Sat (%): 97 % (20 1125)  Pulse via Oximetry: 117 beats per minute (20 2300)  O2 Device: Room air (20 0818)  O2 Flow Rate (L/min): 4 l/min(increased d/t SOB and Sinus Tach) (20 2300)    CXR:    Admission Weight: Last Weight   Weight: 175 lb 0.7 oz (79.4 kg) Weight: 178 lb 2.1 oz (80.8 kg)     Intake / Output / Drain:  Current Shift:  07 -  1900  In: 930 [P.O.:680; I.V.:250]  Out: 175 [Urine:175]  Last 24 hrs.:     Intake/Output Summary (Last 24 hours) at 2020 1503  Last data filed at 2020 1340  Gross per 24 hour   Intake 1440 ml   Output 775 ml   Net 665 ml             No results for input(s): CPK, CKMB, TROIQ in the last 72 hours.   Recent Labs     20  0113 20  0112 20  1055 20  0438 20  0448     --   --  138  139 136   K 4.2  --   --  4.1  4.2 4.0   CO2 25  --   --  24  24 25   BUN 26*  --   --  27*  26* 30*   CREA 1.91*  --   --  1.88*  1.86* 1.99*   *  --   --  107*  104* 98   PHOS 3.5  --   --   --  4.1   MG  --  2.6*  --   --  2.7*   WBC  --   --  5.2  --  5.6   HGB  --   --  7.8*  --  7.8*   HCT  --   --  26.2*  --  25.1*   PLT  --   --  186  --  208     No results for input(s): INR, PTP, APTT, INREXT in the last 72 hours.   No lab exists for component: PBNP        Current Facility-Administered Medications:     albumin human 25% (BUMINATE) solution 25 g, 25 g, IntraVENous, Q6H, Fazal Harper MD    hydrALAZINE (APRESOLINE) 20 mg/mL injection 5 mg, 5 mg, IntraVENous, Q4H PRN, Ena Shelley MD, 5 mg at 07/19/20 2316    iron sucrose (VENOFER) 300 mg in 0.9% sodium chloride 250 mL IVPB, 300 mg, IntraVENous, Q24H, Skyla Howe MD, Last Rate: 176.7 mL/hr at 07/19/20 1711, 300 mg at 07/19/20 1711    sodium chloride (NS) flush 5-40 mL, 5-40 mL, IntraVENous, Q8H, Hyun Zhao, DO, 10 mL at 07/20/20 0936    sodium chloride (NS) flush 5-40 mL, 5-40 mL, IntraVENous, PRN, Cr Johnson M, DO    acetaminophen (TYLENOL) tablet 650 mg, 650 mg, Oral, Q4H PRN, Hyun Johnson, DO    diphenhydrAMINE (BENADRYL) injection 12.5 mg, 12.5 mg, IntraVENous, Q4H PRN, Cr Johnson M, DO    ondansetron [facility]) injection 4 mg, 4 mg, IntraVENous, Q4H PRN, Cr Johnson M, DO    bisacodyL (DULCOLAX) tablet 5 mg, 5 mg, Oral, DAILY PRN, Cr Johnson M, DO    [Held by provider] digoxin (LANOXIN) tablet 0.125 mg, 0.125 mg, Oral, EVERY OTHER DAY, Hyun Zhao M, DO    finasteride (PROSCAR) tablet 5 mg, 5 mg, Oral, DAILY, Hyun Zhao, DO, 5 mg at 07/20/20 1907    [facility] ac& pc-s.therm-b.anim (TIAN Q/RISAQUAD), 1 Cap, Oral, DAILY, Hyun Zhao, DO, 1 Cap at 07/20/20 9788    levETIRAcetam (KEPPRA) tablet 250 mg, 250 mg, Oral, BID, Hyun Zhao, DO, 250 mg at 07/20/20 4964    magnesium oxide (MAG-OX) tablet 800 mg, 800 mg, Oral, BID, David FRENCH, DO, 800 mg at 07/20/20 0979    tamsulosin [facility]) capsule 0.8 mg, 0.8 mg, Oral, QHS, Hyun Zhao, DO, 0.8 mg at 07/19/20 2326    venlafaxine-SR (EFFEXOR-XR) capsule 150 mg, 150 mg, Oral, DAILY WITH BREAKFAST, Hyun Zhao, DO, 150 mg at 07/20/20 0936    piperacillin-tazobactam (ZOSYN) 3.375 g in 0.9% sodium chloride (MBP/ADV) 100 mL, 3.375 g, IntraVENous, Q8H, Hyun Zhao, , Last Rate: 25 mL/hr at 07/20/20 1205, 3.375 g at 07/20/20 1205    A/P  S/P LVAD - good flows  Need for anticoagulation - on hodl due to recurrent bleeds  UTI - abx's  Confusion - monitor     Risk of morbidity and mortality - high  Medical decision making - high complexity    Signed By: Alee Jiménez MD

## 2020-07-20 NOTE — PROGRESS NOTES
Problem: Falls - Risk of  Goal: *Absence of Falls  Description: Document Shauna Spicer Fall Risk and appropriate interventions in the flowsheet.   Outcome: Progressing Towards Goal  Note: Fall Risk Interventions:  Mobility Interventions: Communicate number of staff needed for ambulation/transfer, OT consult for ADLs, Patient to call before getting OOB, PT Consult for mobility concerns         Medication Interventions: Evaluate medications/consider consulting pharmacy, Teach patient to arise slowly, Patient to call before getting OOB    Elimination Interventions: Call light in reach, Patient to call for help with toileting needs, Stay With Me (per policy)              Problem: LVAD: Discharge Outcomes  Goal: *Hemodynamically stable  Outcome: Progressing Towards Goal  Goal: *Lungs clear or at baseline  Outcome: Progressing Towards Goal  Goal: *Tolerating diet  Outcome: Progressing Towards Goal  Goal: *LVAD parameters within set limits  Outcome: Progressing Towards Goal  Goal: *LVAD drive line site without signs and symptoms of wound complications  Outcome: Progressing Towards Goal

## 2020-07-20 NOTE — PROGRESS NOTES
ID Progress Note  2020    Subjective:     Doing better overall, still fatigues easily    Objective:     Antibiotics:  1. Zosyn       Vitals:   Visit Vitals  /80   Pulse 90   Temp 97.7 °F (36.5 °C)   Resp 20   Ht 6' 2\" (1.88 m)   Wt 80.8 kg (178 lb 2.1 oz)   SpO2 100%   BMI 22.87 kg/m²        Tmax:  Temp (24hrs), Av.2 °F (36.8 °C), Min:97.7 °F (36.5 °C), Max:98.7 °F (37.1 °C)      Exam:  Lungs:  clear to auscultation bilaterally  Heart:  regular rate and rhythm  Abdomen:  soft, non-tender. Bowel sounds normal. No masses,  no organomegaly    Labs:      Recent Labs     20  1544 20  0113 20  1055 20  0438 20  0448   WBC  --   --  5.2  --  5.6   HGB  --   --  7.8*  --  7.8*   PLT  --   --  186  --  208   BUN 25* 26*  --  27*  26* 30*   CREA 1.79* 1.91*  --  1.88*  1.86* 1.99*     --   --  99  100 115   TBILI 0.6  --   --  0.5  0.5 0.6       Cultures:     No results found for: [facility]  Lab Results   Component Value Date/Time    Culture result: PENDING 2020 02:20 PM    Culture result: (A) 2020 04:38 AM     GRAM NEGATIVE RODS GROWING IN 2 OF 4 BOTTLES DRAWN . ..(SITES= LFA ANR R ARM)    Culture result: (A) 2020 04:38 AM     PRELIMINARY REPORT OF GRAM NEGATIVE RODS GROWING IN 2 OF 4 BOTTLES DRAWN . ..(SITES= LFA AND R ARM) CALLED TO AND READ BACK BY DIANA POWELL ([place]) ON 2020 AT 0700 BY VY    Culture result: REMAINING BOTTLE(S) HAS/HAVE NO GROWTH SO FAR 2020 04:38 AM       Radiology:     Line/Insert Date:           Assessment:     1. Drive line infection  2. Recurrent Pseudomonas bacteremia  3. Organism resistant to oral antibiotic options    Objective:     1. Continue current therapy  2.  Further management options include lifelong IV antibiotic therapy vs re-siting the LVAD    Beth Barton MD

## 2020-07-20 NOTE — PROGRESS NOTES
SPEECH LANGUAGE PATHOLOGY BEDSIDE SWALLOW EVALUATION/DISCHARGE  Patient: Sancho De Luna (75 y.o. male)  Date: 7/20/2020  Primary Diagnosis: Acute encephalopathy [G93.40]  Acute encephalopathy [G93.40]       Precautions: n/a       ASSESSMENT :  Based on the objective data described below, the patient presents with functional oral and pharyngeal phases of swallow. Pt presented without any difficulty or any overt s/s of aspiration with his food tray (and pt with audible aspiration per MBS completed), therefore, recommend diet outlined below. Additionally, pt presents with hoarse, strained vocal quality suspected 2/2 known left vocal fold paralysis. Benefited from turning head towards left shoulder when communicating to improve vocal intensity. Given that pt tolerating diet without difficulty, no further SLP needs. However, would recommend OP ENT for further management of presumed left vocal fold dysfunction. SLP will sign-off. .  Patients rehabilitation potential is considered to be Fair     PLAN :  Recommendations and Planned Interventions:  --Regular diet/thin liquids  --Upright during meals  --General aspiration precautions  Frequency/Duration: Patient will be discharged. Discharge Recommendations: Outpatient ENT for vocal fold dysfunction     SUBJECTIVE:   Patient was alert and cooperative.     OBJECTIVE:     Past Medical History:   Diagnosis Date    BPH (benign prostatic hyperplasia)     [facility]     Degenerative disc disease, lumbar     Heart failure ([facility])     High cholesterol     Hypertension     Ischemic cardiomyopathy     LVAD (left ventricular assist device) present ([facility])     Paroxysmal atrial fibrillation ([facility] [address] 4/2/2019    Spinal stenosis      Past Surgical History:   Procedure Laterality Date    COLONOSCOPY Left 11/11/2019    COLONOSCOPY at bedside performed by Leida Ibrahim MD at [facility] triple    HX HEART ASSIST DEVICE Left 10/29/2019    Impella 5.0    HX HEART ASSIST DEVICE Left 11/18/2019    HeartMate 3    HX HERNIA REPAIR      HX IMPLANTABLE CARDIOVERTER DEFIBRILLATOR      HX THROMBECTOMY Left 11/25/2019    Left brachial thrombectomy    DC CARDIOVERSION ELECTIVE ARRHYTHMIA EXTERNAL N/A 6/10/2019    EP CARDIOVERSION performed by Zeyad Onofre MD at [address] CATH LAB    DC CARDIOVERSION ELECTIVE ARRHYTHMIA EXTERNAL N/A 6/18/2019    EP CARDIOVERSION performed by Jens Cottrell MD at [address] CATH LAB    DC INSJ/RPLCMT PERM DFB W/TRNSVNS LDS 1/DUAL CHMBR N/A 6/21/2019    INSERT ICD BIV MULTI performed by Zeeshan Kirk MD at [address] CATH LAB    DC TCAT IMPL WRLS P-ART PRS SNR L-T HEMODYN MNTR N/A 9/18/2019    IMPLANT HEART FAILURE MONITORING DEVICE performed by Yolette Santos MD at [address] CATH LAB     Prior Level of Function/Home Situation:   Home Situation  Home Environment: Private residence  One/Two Story Residence: One story  Living Alone: No  Support Systems: Spouse/Significant Other/Partner, Child(vanessa)  Patient Expects to be Discharged to[de-identified] Private residence  Current DME Used/Available at Home: None  Diet prior to admission: Regular diet/thin liquids  Current Diet:  Regular diet/thin liquids   Cognitive and Communication Status:  Neurologic State: Alert  Orientation Level: Oriented X4  Cognition: Follows commands  Perception: Appears intact  Perseveration: No perseveration noted  Safety/Judgement: Not assessed  Oral Assessment:  Oral Assessment  Labial: No impairment  Dentition: Natural;Intact  Oral Hygiene: oral mucosa moist and clear of secretions  Lingual: No impairment  Velum: No impairment  Mandible: No impairment  P.O. Trials:  Patient Position: upright in bed  Vocal quality prior to P.O.: Hoarse;Strain  Consistency Presented: Thin liquid; Solid  How Presented: Self-fed/presented;Cup/sip     Bolus Acceptance: No impairment  Bolus Formation/Control: No impairment     Propulsion: No impairment  Oral Residue: None  Initiation of Swallow: No impairment  Laryngeal Elevation: Functional  Aspiration Signs/Symptoms: None  Pharyngeal Phase Characteristics: No impairment, issues, or problems              Oral Phase Severity: No impairment  Pharyngeal Phase Severity : No impairment    NOMS:   The NOMS functional outcome measure was used to quantify this patient's level of swallowing impairment. Based on the NOMS, the patient was determined to be at level 7 for swallow function       NOMS Swallowing Levels:  Level 1 (CN): NPO  Level 2 (CM): NPO but takes consistency in therapy  Level 3 (CL): Takes less than 50% of nutrition p.o. and continues with nonoral feedings; and/or safe with mod cues; and/or max diet restriction  Level 4 (CK): Safe swallow but needs mod cues; and/or mod diet restriction; and/or still requires some nonoral feeding/supplements  Level 5 (CJ): Safe swallow with min diet restriction; and/or needs min cues  Level 6 (CI): Independent with p.o.; rare cues; usually self cues; may need to avoid some foods or needs extra time  Level 7 ([address] [place] [address]): Independent for all p.o.  ECHO. (2003). National Outcomes Measurement System (NOMS): Adult Speech-Language Pathology User's Guide. Pain:  Pain Scale 1: Numeric (0 - 10)  Pain Intensity 1: 0       After treatment:   Patient left in no apparent distress in bed, Call bell within reach and Nursing notified    COMMUNICATION/EDUCATION:     The patient's plan of care including recommendations, planned interventions, and recommended diet changes were discussed with: Registered nurse. Thank you for this referral.  Prema Fallon  Time Calculation: 15 mins    Regarding student involvement in patient care:  A student participated in this treatment session. Per CMS Medicare statements and ECHO guidelines I certify that the following was true:  1. I was present and directly observed the entire session.   2. I made all skilled judgments and clinical decisions regarding care. 3. I am the practitioner responsible for assessment, treatment, and documentation.

## 2020-07-20 NOTE — PHYSICIAN ADVISORY
Letter of Status Determination:   Recommend hospitalization status upgraded from   OBSERVATION  to INPATIENT  Status     Pt Name:  Prosper Breen   MR#   [facility] # [id number] /  [id number]  Payor: Antonette Chance / Plan: Monique Brooks / Product Type: Medicare /    ROGER#  [id number]   Room and Hospital  466/01  @ [facility]   Hospitalization date  7/17/2020 12:22 PM   Current Attending Physician  Nahomy Zazueta MD   Principal diagnosis  AMS   Clinicals  71year old male with past medical history heart failure, LVEF 15%, on LVAD, paroxysmal atrial fibrillation, BPH, recent admission for hematuria, presenting to [facility] with progressive and worsening confusion.  Per wife, patient has been progressively confused during the past week.   Pt w/ AMS, urinary retention, GNR bacteremia 1 out of 4 bottles collected on 7/17/2020, preliminarily report suggest Pseudomonas, Chronic LVAD, Chronic systolic heart failure EF 15%, JUAN J     Milliman (MCG) criteria   Does  NOT apply    STATUS DETERMINATION  INPATIENT    The final decision of the patient's hospitalization status depends on the attending physician's judgment    Additional comments     Payor: Antonette Chance / Plan: VA MEDICARE PART A & B / Product Type: Medicare /         Margaretha Barthel MD  Cell: [phone]  Physician Advisor

## 2020-07-20 NOTE — PROGRESS NOTES
[facility] in [place], [place]  Inpatient Progress Note      Patient name: Kiko [place]  Patient : 1950  Patient MRN: [id number]  Attending MD: Jennifer White MD  Date of service: 20    REASON FOR CONSULT:  UTI in patient with LVAD     PLAN:  Continue current device speed at 5,800rpm  Cannot tolerate beta-blockers d/t severe RV failure  Cannot tolerate ACE/ARB/ARNi due to persistent orthostasis despite increased fluid intake   Cannot tolerate spironolactone due to IVVD and hyponatremia  Hold digoxin due to level 0.9; may resume low dose tomorrow  Monitor CardioMEMs readings, 15mmHg today  No diuretics due to acute renal failure; creatinine 1.91  LVEDD > 7cm; consider RHC to determine device speed  Unable to tolerate anticoagulation due to hematuria; awaiting LDH off aspirin  S/p Venofer infusion 200mg IV x once (20), check hemoccult  Gentamicin to exit site with daily dressing changes  Nephrology consult appreciated, bladder and renal US pending  Albumin IV 12.5mg daily  Continue keppra,  Level 15.5 (20)  Urology consult appreciated; cystoscopy outpatient  Continue zosyn IV, ID consult appreciated  Hospitalist assistance apprecaited  DNR  Daily weights, Low Na+ diet, strict I/O  Trend CBC, CMP, NTproBNP, Mag, LD, procalcitonin, digoxin, - check sed rate  Remain in CVSU     IMPRESSION:  UTI  Metabolic encephalopathy  Chronic systolic heart failure  Stage D, NYHA class II symptoms  Coronary artery disease  Ischemic cardiomyopathy, LVEF 15%  S/p HM3 LVAD as destination therapy (19 by Dr. Tripp Moore)  S/p Impella 5.0 as bridge to LVAD  HTN, goal MAP 70-90mmHg  H/o VT s/p [place] BIV-ICD  PAF  H/o recurrent UTI, pseudomonal infection  Unable to tolerate AC due to hematuria  MSSA drive line infection  COPD  JOSE  Essential tremor on keppra  Depression on effexor  Persistently elevated CEA PET scan increased RML activity, not malignant per hemonc  Orthostatic hypotension  DNR  Up-to-date with PNA vaccine - per PCP/wife  2/4bottles blood cultures +pseudomonas (7/19/20)       Patient seen and examined. Data and note reviewed. I have discussed and agree with the plans as noted. 71year old male with a history of LVAD as DT who presented with confusion and was found to have bacteremia with pseudomonas aeruginosa. He presented with JUAN J on CKD in the setting of bacteremia. Will continue to monitor his renal function closely. He will need a PICC line placed prior to discharge for outpatient IV antibiotics. Thank you for allowing us to participate in your patient's care. Mounika Luna MD, [facility]  Chief of Cardiology, [address] [place] [address], [address] [place] [address], [address] [place] [address]  Office [phone]  Fax [phone]        CARDIAC IMAGING:  TTE 4/20 shows large LVIDd, 6.84 cm, RVIDd 3.06 cm, TAPSE 1.15 cm, severely elevated CVP  TTE 7/6/20- Mild AI, LViDd 6.91cm, RVIDd 5.16cm, TAPSE 1.39cm  Echo (7/18/20)  · Contrast used: DEFINITY. · Image quality for this study was technically difficult. · Severely dilated left ventricle. Wall thickness appears thin. Severe systolic function. Estimated left ventricular ejection fraction is <15%. Visually measured ejection fraction. · Moderately dilated left atrium. · Moderately dilated right ventricle. Moderately to severely reduced systolic function. · Dilated right atrium.   · Moderate mitral valve prolapse.     INTERVAL HISTORY:  No issues overnight  MAPs at goal  HR 80s  I/O net recorded  HGB stable 7.8  Creatinine 1.88  Iron sat 10%    LVAD INTERROGATION:  Device interrogated in person  Device function normal, normal flow, no events  LVAD   Pump Speed (RPM): 5800  Pump Flow (LPM): 4.9  MAP: 88  PI (Pulsitility Index): 3.5  Power: 4.4   Test: No  Back Up  at Bedside & Labeled: No  Power Module Test: No  Driveline Site Care: No  Driveline Dressing: Clean, Dry, and Intact  Outpatient: No  MAP in Therapeutic Range (Outpatient): Yes  Testing  Alarms Reviewed: Yes  Back up SC speed: 5800  Back up Low Speed Limit: 5400  Emergency Equipment with Patient?: No  Emergency procedures reviewed?: Yes  Drive line site inspected?: No(dressing in place)  Drive line intergrity inspected?: Yes  Drive line dressing changed?: No    PHYSICAL EXAM:  Visit Vitals  /80   Pulse 90   Temp 98 °F (36.7 °C)   Resp 20   Ht 6' 2\" (1.88 m)   Wt 178 lb 2.1 oz (80.8 kg)   SpO2 97%   BMI 22.87 kg/m²     Review of Systems   Constitutional: Positive for malaise/fatigue. Negative for chills, fever and weight loss. HENT: Positive for hearing loss. Eyes: Negative. Respiratory: Negative. Cardiovascular: Positive for leg swelling. Negative for chest pain, palpitations and orthopnea. Gastrointestinal: Negative. Genitourinary: Positive for urgency. Negative for dysuria, flank pain and hematuria. Musculoskeletal: Negative. Skin: Negative. Neurological: Positive for weakness. Endo/Heme/Allergies: Bruises/bleeds easily. Psychiatric/Behavioral: Positive for memory loss. Physical Exam  Vitals signs and nursing note reviewed. Constitutional:       General: He is not in acute distress. Appearance: Normal appearance. HENT:      Head: Normocephalic. Mouth/Throat:      Mouth: Mucous membranes are dry. Neck:      Vascular: No JVD. Cardiovascular:      Rate and Rhythm: Normal rate and regular rhythm. Heart sounds: Murmur present. Comments: +lvad hum  Pulmonary:      Effort: Pulmonary effort is normal.      Breath sounds: Normal breath sounds. Abdominal:      General: Bowel sounds are normal.      Palpations: Abdomen is soft. Musculoskeletal:      Right lower leg: Edema present. Left lower leg: Edema present. Skin:     General: Skin is warm and dry.       Capillary Refill: Capillary refill takes 2 to 3 seconds. Comments: Scattered bruising on extremities   Neurological:      Mental Status: He is alert and oriented to person, place, and time. Motor: Weakness present. Psychiatric:         Mood and Affect: Mood normal.         Speech: Speech normal.         Behavior: Behavior is cooperative. Thought Content:  Thought content normal.             PAST MEDICAL HISTORY:  Past Medical History:   Diagnosis Date    BPH (benign prostatic hyperplasia)     [facility]     Degenerative disc disease, lumbar     Heart failure ([facility])     High cholesterol     Hypertension     Ischemic cardiomyopathy     LVAD (left ventricular assist device) present ([facility])     Paroxysmal atrial fibrillation ([facility] [address] 4/2/2019    Spinal stenosis        PAST SURGICAL HISTORY:  Past Surgical History:   Procedure Laterality Date    COLONOSCOPY Left 11/11/2019    COLONOSCOPY at bedside performed by Michael Huynh MD at [address] HX APPENDECTOMY      [address] GRAFT      triple    HX HEART ASSIST DEVICE Left 10/29/2019    Impella 5.0    HX HEART ASSIST DEVICE Left 11/18/2019    HeartMate 3    HX HERNIA REPAIR      HX IMPLANTABLE CARDIOVERTER DEFIBRILLATOR      HX THROMBECTOMY Left 11/25/2019    Left brachial thrombectomy    NH CARDIOVERSION ELECTIVE ARRHYTHMIA EXTERNAL N/A 6/10/2019    EP CARDIOVERSION performed by Kevin Michael MD at [address] [place] [address] [address] CATH LAB    NH CARDIOVERSION ELECTIVE ARRHYTHMIA EXTERNAL N/A 6/18/2019    EP CARDIOVERSION performed by Haseeb Villarreal MD at [place] [address] CATH LAB    NH INSJ/RPLCMT PERM DFB W/TRNSVNS LDS 1/DUAL CHMBR N/A 6/21/2019    INSERT ICD BIV MULTI performed by Andre Curry MD at [place] [address] CATH LAB    NH TCAT IMPL WRLS P-ART PRS SNR L-T HEMODYN MNTR N/A 9/18/2019    IMPLANT HEART FAILURE MONITORING DEVICE performed by Bryant Anguiano MD at [place] [address] CATH LAB       FAMILY HISTORY:  Family History   Problem Relation Age of Onset    Lung Disease Mother     Hypertension Mother     Arthritis-osteo Mother     Heart Disease Mother     Heart Disease Father     Diabetes Father        SOCIAL HISTORY:  Social History     Socioeconomic History    Marital status:      Spouse name: Not on file    Number of children: Not on file    Years of education: Not on file    Highest education level: Not on file   Tobacco Use    Smoking status: Former Smoker     Last attempt to quit: 2010     Years since quittin.6    Smokeless tobacco: Never Used   Substance and Sexual Activity    Alcohol use: Not Currently     Comment: rarely    Drug use: Never   Other Topics Concern       LABORATORY RESULTS:     Labs Latest Ref Rng & Units 2020   WBC 4.1 - 11.1 K/uL - 5.2 - - 5.6 5.3 5.8   RBC 4.10 - 5.70 M/uL - 2.69(L) - - 2.60(L) 2.59(L) 2.60(LL)   Hemoglobin 12.1 - 17.0 g/dL - 7. 8(L) - - 7. 8(L) 7. 9(L) 7. 9(L)   Hematocrit 36.6 - 50.3 % - 26. 2(L) - - 25. 1(L) 25. 1(L) 24. 7(L)   MCV 80.0 - 99.0 FL - 97.4 - - 96.5 96.9 95   Platelets 162 - 347 K/uL - 186 - - 208 174 203   Lymphocytes 12 - 49 % - 11(L) - - - 8(L) -   Monocytes 5 - 13 % - 10 - - - 9 -   Eosinophils 0 - 7 % - 2 - - - 1 -   Basophils 0 - 1 % - 1 - - - 0 -   Albumin 3.5 - 5.0 g/dL 2. 9(L) - 2. 6(L) 2. 7(L) 2. 6(L) 3.0(L) 3. 3(L)   Calcium 8.5 - 10.1 MG/DL 8. 0(L) - 8. 4(L) 8.2(L) 8.7 8.5 8.6   Glucose 65 - 100 mg/dL 108(H) - 107(H) 104(H) 98 120(H) 85   BUN 6 - 20 MG/DL 26(H) - 27(H) 26(H) 30(H) 33(H) 24   Creatinine 0.70 - 1.30 MG/DL 1.91(H) - 1.88(H) 1.86(H) 1.99(H) 2.09(H) 1.42(H)   Sodium 136 - 145 mmol/L 136 - 138 139 136 135(L) 135   Potassium 3.5 - 5.1 mmol/L 4.2 - 4.1 4.2 4.0 4.5 4.4   TSH 0.36 - 3.74 uIU/mL - - - - 1.13 - -   PSA 0.01 - 4.0 ng/mL - - - - - - -   LDH 85 - 241 U/L 224 280(H) - - - - -   Some recent data might be hidden     Lab Results   Component Value Date/Time    TSH 1.13 07/18/2020 11:28 AM    TSH 1.70 04/21/2020 01:59 PM    TSH 2.30 12/03/2019 03:29 AM    TSH 2.40 10/25/2019 07:39 PM    TSH 2.45 06/01/2019 04:16 AM       ALLERGY:  Allergies   Allergen Reactions    Cefepime Other (comments)     myoclonus        CURRENT MEDICATIONS:    Current Facility-Administered Medications:     albumin human 25% (BUMINATE) solution 25 g, 25 g, IntraVENous, Q6H, Lisy Harper MD    hydrALAZINE (APRESOLINE) 20 mg/mL injection 5 mg, 5 mg, IntraVENous, Q4H PRN, Ector Conner MD, 5 mg at 07/19/20 2316    iron sucrose (VENOFER) 300 mg in 0.9% sodium chloride 250 mL IVPB, 300 mg, IntraVENous, Q24H, Taiwo Conde MD, Last Rate: 176.7 mL/hr at 07/19/20 1711, 300 mg at 07/19/20 1711    sodium chloride (NS) flush 5-40 mL, 5-40 mL, IntraVENous, Q8H, Hyun Zhao DO, 10 mL at 07/20/20 0936    sodium chloride (NS) flush 5-40 mL, 5-40 mL, IntraVENous, PRN, Jetty Lofts, [employer] M, DO    acetaminophen (TYLENOL) tablet 650 mg, 650 mg, Oral, Q4H PRN, Hyun Banegas DO    diphenhydrAMINE (BENADRYL) injection 12.5 mg, 12.5 mg, IntraVENous, Q4H PRN, CARLOS Banegas, DO    ondansetron [facility]) injection 4 mg, 4 mg, IntraVENous, Q4H PRN, CARLOS Zhao,     bisacodyL (DULCOLAX) tablet 5 mg, 5 mg, Oral, DAILY PRN, CARLOS Banegas, DO    [Held by provider] digoxin (LANOXIN) tablet 0.125 mg, 0.125 mg, Oral, EVERY OTHER DAY, Hyun Zhao DO    finasteride (PROSCAR) tablet 5 mg, 5 mg, Oral, DAILY, Hyun Zhao, , 5 mg at 07/20/20 4675    lactobac ac& pc-s.therm-b.anim (TIAN Q/RISAQUAD), 1 Cap, Oral, DAILY, Hyun Zhao M, DO, 1 Cap at 07/20/20 5223    levETIRAcetam (KEPPRA) tablet 250 mg, 250 mg, Oral, BID, Hyun Zhao, DO, 250 mg at 07/20/20 5100    magnesium oxide (MAG-OX) tablet 800 mg, 800 mg, Oral, BID, Hyun Zhao, DO, 800 mg at 07/20/20 0936    tamsulosin (FLOMAX) capsule 0.8 mg, 0.8 mg, Oral, QHS, Hyun Zhao, DO, 0.8 mg at 07/19/20 2326    venlafaxine-SR (EFFEXOR-XR) capsule 150 mg, 150 mg, Oral, DAILY WITH BREAKFAST, Hyun Zhao, , 150 mg at 07/20/20 0936    piperacillin-tazobactam (ZOSYN) 3.375 g in 0.9% sodium chloride (MBP/ADV) 100 mL, 3.375 g, IntraVENous, Q8H, Hyun Zhao DO, Last Rate: 25 mL/hr at 07/20/20 1205, 3.375 g at 07/20/20 1205    Thank you for allowing me to participate in this patient's care.     Delmy Stover, TIERRA  [address] [place] [address], [address]  Phone: [phone]  Fax: [phone]

## 2020-07-21 NOTE — PROGRESS NOTES
[facility] in [place], [place]  Inpatient Progress Note      Patient name: Zechariah Campos  Patient : 1950  Patient MRN: [id number]  Attending MD: Yury Enriquez MD  Date of service: 20    REASON FOR CONSULT:  UTI in patient with LVAD       24hr Events:  No acute overnight events  hgb 6.7 this am      PLAN:  Continue current device speed at 5,800rpm  Cannot tolerate beta-blockers d/t severe RV failure  Cannot tolerate ACE/ARB/ARNi due to persistent orthostasis despite increased fluid intake   Cannot tolerate spironolactone due to IVVD and hyponatremia  Resume digoxin 0.125 every other day, level 0.8; trend levels daily  Monitor CardioMEMs readings, 15mmHg (20)  No diuretics due to acute renal failure; creatinine 1.61  Albumin IV 12.5mg daily  LVEDD > 7cm; consider RHC to determine device speed  Unable to tolerate anticoagulation due to hematuria; awaiting LDH off aspirin  S/p Venofer infusion 200mg IV x once (20),   hgb 6.7 this am, repeat hgb 7.2, check hemoccult and UA  Gentamicin to exit site with daily dressing changes  Nephrology consult appreciated,   bladder and renal US (20) No evidence for hydronephrosis or mass lesion, small left kidney  Rack urine for 24 hrs; yellow - tea color   Urology consult appreciated; recommending cystoscopy outpatient sending urine for cytology  Continue zosyn IV, ID consult appreciated  Continue soren Q po BID   Start Fiber con 625 po daily   Nutrition consult, appreciate recs  Continue keppra,  Level 15.5 (20)  Hospitalist assistance appreciated  DNR  Daily weights, Low Na+ diet, strict I/O  Trend CBC, CMP, NTproBNP, Mag, LD, procalcitonin, digoxin, - check sed rate  Remain in CVSU     IMPRESSION:  UTI  Metabolic encephalopathy  Chronic systolic heart failure  Stage D, NYHA class II symptoms  Coronary artery disease  Ischemic cardiomyopathy, LVEF 15%  S/p HM3 LVAD as destination therapy (19 by Dr. Courtney Lynn)  S/p Impella 5.0 as bridge to LVAD  HTN, goal MAP 70-90mmHg  H/o VT s/p [facility] BIV-ICD  PAF  H/o recurrent UTI, pseudomonal infection  Unable to tolerate AC due to hematuria  MSSA drive line infection  COPD  JOSE  Essential tremor on keppra  Depression on effexor  Persistently elevated CEA PET scan increased RML activity, not malignant per hemonc  Orthostatic hypotension  DNR  Up-to-date with PNA vaccine - per PCP/wife  2/4bottles blood cultures +pseudomonas (7/19/20)   diarrhea        CARDIAC IMAGING:  TTE 4/20 shows large LVIDd, 6.84 cm, RVIDd 3.06 cm, TAPSE 1.15 cm, severely elevated CVP  TTE 7/6/20- Mild AI, LViDd 6.91cm, RVIDd 5.16cm, TAPSE 1.39cm  Echo (7/18/20)  · Contrast used: DEFINITY. · Image quality for this study was technically difficult. · Severely dilated left ventricle. Wall thickness appears thin. Severe systolic function. Estimated left ventricular ejection fraction is <15%. Visually measured ejection fraction. · Moderately dilated left atrium. · Moderately dilated right ventricle. Moderately to severely reduced systolic function. · Dilated right atrium.   · Moderate mitral valve prolapse.       LVAD INTERROGATION:  Device interrogated in person  Device function normal, normal flow, no events  LVAD   Pump Speed (RPM): 5800  Pump Flow (LPM): 4.8  MAP: 90  PI (Pulsitility Index): 3.8  Power: 4.4   Test: Yes  Back Up  at Bedside & Labeled: Yes  Power Module Test: Yes  Driveline Site Care: No  Driveline Dressing: Clean, Dry, and Intact  Outpatient: No  MAP in Therapeutic Range (Outpatient): Yes  Testing  Alarms Reviewed: Yes  Back up SC speed: 5800  Back up Low Speed Limit: 5400  Emergency Equipment with Patient?: Yes  Emergency procedures reviewed?: Yes  Drive line site inspected?: Yes  Drive line intergrity inspected?: Yes  Drive line dressing changed?: No    PHYSICAL EXAM:  Visit Vitals  /80   Pulse 85   Temp 97.9 °F (36.6 °C)   Resp 18 Ht 6' 2\" (1.88 m)   Wt 179 lb 3.7 oz (81.3 kg)   SpO2 100%   BMI 23.01 kg/m²     Review of Systems   Constitutional: Positive for malaise/fatigue. Negative for chills, fever and weight loss. HENT: Positive for hearing loss. Eyes: Negative. Respiratory: Positive for shortness of breath. Cardiovascular: Positive for leg swelling. Negative for chest pain, palpitations and orthopnea. Gastrointestinal: Negative. Genitourinary: Positive for hematuria and urgency. Negative for dysuria and flank pain. Musculoskeletal: Negative. Skin: Negative. Neurological: Positive for weakness. Endo/Heme/Allergies: Bruises/bleeds easily. Psychiatric/Behavioral: Positive for depression and memory loss. Physical Exam  Vitals signs and nursing note reviewed. Constitutional:       General: He is not in acute distress. Appearance: Normal appearance. HENT:      Head: Normocephalic. Mouth/Throat:      Mouth: Mucous membranes are dry. Neck:      Vascular: No JVD. Cardiovascular:      Rate and Rhythm: Normal rate and regular rhythm. Heart sounds: Murmur present. Comments: +lvad hum  Pulmonary:      Effort: Pulmonary effort is normal.      Breath sounds: Normal breath sounds. Abdominal:      General: Bowel sounds are normal.      Palpations: Abdomen is soft. Musculoskeletal:      Right lower leg: Edema present. Left lower leg: Edema present. Skin:     General: Skin is warm and dry. Capillary Refill: Capillary refill takes 2 to 3 seconds. Comments: Scattered bruising on extremities   Neurological:      Mental Status: He is alert and oriented to person, place, and time. Motor: Weakness present. Psychiatric:         Mood and Affect: Mood normal.         Speech: Speech normal.         Behavior: Behavior is cooperative. Thought Content:  Thought content normal.             PAST MEDICAL HISTORY:  Past Medical History:   Diagnosis Date    BPH (benign prostatic hyperplasia)     CHF (congestive heart failure) (HCC)     Degenerative disc disease, lumbar     Heart failure (HCC)     High cholesterol     Hypertension     Ischemic cardiomyopathy     [facility]left ventricular assist device) [facility]     Paroxysmal atrial fibrillation ([facility] 4/2/2019    Spinal stenosis        PAST SURGICAL HISTORY:  Past Surgical History:   Procedure Laterality Date    COLONOSCOPY Left 11/11/2019    COLONOSCOPY at bedside [facility] Crabtree [facility] at [facility] ENDOSCOPY    HX APPENDECTOMY      HX CORONARY ARTERY BYPASS GRAFT      triple    HX HEART ASSIST DEVICE Left 10/29/2019    Impella 5.0    HX HEART ASSIST DEVICE Left 11/18/2019    HeartMate 3    HX HERNIA REPAIR      HX IMPLANTABLE CARDIOVERTER DEFIBRILLATOR      HX THROMBECTOMY Left 11/25/2019    Left brachial thrombectomy    SC CARDIOVERSION ELECTIVE ARRHYTHMIA EXTERNAL N/A 6/10/2019    EP CARDIOVERSION performed by Nannette Noble MD at [place] [address], [address] CATH LAB    SC CARDIOVERSION ELECTIVE ARRHYTHMIA EXTERNAL N/A 6/18/2019    EP CARDIOVERSION performed by Joshua Carpio MD at [place] [address], [address] CATH LAB    SC INSJ/RPLCMT PERM DFB W/TRNSVNS LDS 1/DUAL CHMBR N/A 6/21/2019    INSERT ICD BIV MULTI performed by Jean Catalan MD at [place] [address], [address] CATH LAB    SC TCAT Jaida Ebbs P-ART PRS SNR L-T HEMODYN MNTR N/A 9/18/2019    IMPLANT HEART FAILURE MONITORING DEVICE performed by Nataliya Zapien MD at [place] [address], [address] CATH LAB       FAMILY HISTORY:  Family History   Problem Relation Age of Onset    Lung Disease Mother     Hypertension Mother     Arthritis-osteo Mother     Heart Disease Mother     Heart Disease Father     Diabetes Father        SOCIAL HISTORY:  Social History     Socioeconomic History    Marital status:      Spouse name: Not on file    Number of children: Not on file    Years of education: Not on file    Highest education level: Not on file   Tobacco Use    Smoking status: Former Smoker     Last attempt to quit: 2010     Years since quittin.6    Smokeless tobacco: Never Used   Substance and Sexual Activity    Alcohol use: Not Currently     Comment: rarely    Drug use: Never   Other Topics Concern       LABORATORY RESULTS:     Labs Latest Ref Rng & Units 2020   WBC 4.1 - 11.1 K/uL - - - 5.8 - - -   RBC 4.10 - 5.70 M/uL - - - 2.23(L) - - -   Hemoglobin 12.1 - 17.0 g/dL 7. 2(L) - - 6. 7(L) - - -   Hematocrit 36.6 - 50.3 % 23. 6(L) - - 21. 8(L) - - -   MCV 80.0 - 99.0 FL - - - 97.8 - - -   Platelets 238 - 269 K/uL - - - 178 - - -   Lymphocytes 12 - 49 % - - - - - - -   Monocytes 5 - 13 % - - - - - - -   Eosinophils 0 - 7 % - - - - - - -   Basophils 0 - 1 % - - - - - - -   Albumin 3.5 - 5.0 g/dL - 3. 2(L) 3. 2(L) 3. 1(L) 2. 9(L) 2. 9(L) -   Calcium 8.5 - 10.1 MG/DL - 8.5 - - 8. 4(L) 8.0(L) -   Glucose 65 - 100 mg/dL - 95 - - 104(H) 108(H) -   BUN 6 - 20 MG/DL - 21(H) - - 25(H) 26(H) -   Creatinine 0.70 - 1.30 MG/DL - 1.61(H) - - 1.79(H) 1.91(H) -   Sodium 136 - 145 mmol/L - 136 - - 136 136 -   Potassium 3.5 - 5.1 mmol/L - 4.4 - - 4.1 4.2 -   TSH 0.36 - 3.74 uIU/mL - - - - - - -   PSA 0.01 - 4.0 ng/mL - - - - - - -   LDH 85 - 241 U/L - - - 193 - - 224   Some recent data might be hidden     Lab Results   Component Value Date/Time    TSH 1.13 2020 11:28 AM    TSH 1.70 2020 01:59 PM    TSH 2.30 2019 03:29 AM    TSH 2.40 10/25/2019 07:39 PM    TSH 2.45 2019 04:16 AM       ALLERGY:  Allergies   Allergen Reactions    Cefepime Other (comments)     myoclonus        CURRENT MEDICATIONS:    Current Facility-Administered Medications:     0.9% sodium chloride infusion 250 mL, 250 mL, IntraVENous, PRN, Shana Sims NP    [START ON 2020] calcium polycarbophiL (FIBERCON) tablet 625 mg, 1 Tab, Oral, DAILY, Laura Mckenzie NP    hydrALAZINE (APRESOLINE) 20 mg/mL injection 5 mg, 5 mg, IntraVENous, Q4H PRN, Masoud Marte, Isa Guevara MD, 5 mg at 07/21/20 0509    sodium chloride (NS) flush 5-40 mL, 5-40 mL, IntraVENous, Q8H, Madhuri Zhao DO, 10 mL at 07/21/20 1420    sodium chloride (NS) flush 5-40 mL, 5-40 mL, IntraVENous, PRN, Kalpana Louise, [employer] M, DO    acetaminophen (TYLENOL) tablet 650 mg, 650 mg, Oral, Q4H PRN, Kalpana Louise, [employer] M, DO    diphenhydrAMINE (BENADRYL) injection 12.5 mg, 12.5 mg, IntraVENous, Q4H PRN, Kalpana Louise, [employer] M, DO    ondansetron [facility]) injection 4 mg, 4 mg, IntraVENous, Q4H PRN, Hyun Davis, DO    bisacodyL (DULCOLAX) tablet 5 mg, 5 mg, Oral, DAILY PRN, CARLOS Davis, DO    digoxin (LANOXIN) tablet 0.125 mg, 0.125 mg, Oral, EVERY OTHER DAY, Hyun Zhao, DO    finasteride (PROSCAR) tablet 5 mg, 5 mg, Oral, DAILY, Hyun Zhao, DO, 5 mg at 07/21/20 6281    lactobac ac& pc-s.therm-b.anim (TIAN Q/RISAQUAD), 1 Cap, Oral, DAILY, Madhuri Zhao DO, 1 Cap at 07/21/20 7716    levETIRAcetam (KEPPRA) tablet 250 mg, 250 mg, Oral, BID, Hyun Zhao M, DO, 250 mg at 07/21/20 1702    magnesium oxide (MAG-OX) tablet 800 mg, 800 mg, Oral, BID, Zhao, Hyun M, DO, 800 mg at 07/21/20 1702    tamsulosin (FLOMAX) capsule 0.8 mg, 0.8 mg, Oral, QHS, Hyun Zhao M, DO, 0.8 mg at 07/20/20 2202    venlafaxine-SR (EFFEXOR-XR) capsule 150 mg, 150 mg, Oral, DAILY WITH BREAKFAST, Hyun Zhao M, DO, 150 mg at 07/21/20 0839    piperacillin-tazobactam (ZOSYN) 3.375 g in 0.9% sodium chloride (MBP/ADV) 100 mL, 3.375 g, IntraVENous, Q8H, Hyun Zhao, , Last Rate: 25 mL/hr at 07/21/20 1142, 3.375 g at 07/21/20 1142    Thank you for allowing me to participate in this patient's care.     Alma Jones NP  [address] [place] [address], [address]  Phone: [phone]  Fax: [phone]

## 2020-07-21 NOTE — CDMP QUERY
#1    Pt admitted with uti and has anemia documented. Please further specify type and acuity of anemia in the medical record.         Anemia due to iron deficiency   Anemia due to chronic disease, please specify disease   Other, please specify   Clinically unable to determine    The medical record reflects the following:     Risk Factors: anemia     Clinical Indicators:   7/17/2020 11:51  WBC: 5.3  HGB: 7.9 (L)  HCT: 25.1 (L)    7/21/2020 04:53  HGB: 6.7 (L)  HCT: 21.8 (L)    7/18/2020 04:48  Iron: 19 (L)  TIBC: 183 (L)  Iron % saturation: 10 (L)  Ferritin: 222         Treatment:lab monitoring Iron IV    Thank you,         Yaquelin Stafford [facility]

## 2020-07-21 NOTE — CDMP QUERY
#2    Pt admitted with UTI/Pt noted to have documentation of bacteremia.    If possible, please document in the progress notes and d/c summary if you are evaluating and / or treating any of the following:    Erwin Leroy infection [address] Infection not [address] Infection colonization only   Other, please specify   Clinically unable to determine    The medical record reflects the following:    Risk Factors: AMS, positive blood cultures    Clinical Indicators:   H/P  Acute encephalopathy, suspected metabolic:  Suspected urinary tract infection:  -presenting with acute progressive confusion x1 week    7/18-Nurses note  1805: MD made aware that second set of blood cultures growing gram neg rods    7/18-PN  #GNR bacteremia, 1 out of 4 bottles collected on 7/17/2020 7/19-ID  · LVAD drive line infection  · History of Pseudomonas bacteremia  · AMS  · GNR from blood cultures            Treatment:zosyn IV, lab monitoring, cardiology consult, ID consult    Thank you,         Eduardo Quiñones [address] [place] [address]

## 2020-07-21 NOTE — PROGRESS NOTES
Nephrology Progress Note  Sona Kiser  Date of Admission : 7/17/2020    CC: Follow up for JUAN J on CKD       Assessment and Plan     JUAN J on CKD  - 2/2 volume depletion  - U/S neg for hydronephrosis  - Cr improving after albumin  - hold fluids and monitor  - daily labs and I/Os    HFrEF:  - EF 16-20%, NYHA Class IV , hx of VF arrest - s/p AICD  - s/p LVAD placement on 11/18     CKD Stage III   - Mild Left renal atrophy at baseline   - baseline Cr around 1.2 to 1.4     BPH w/ hx of urinary retention:  - bladder scan and SC if residual > 500  - on flomax and proscar     PAF s/p DCCV 6/19     Anemia:  - getting IV iron  - hgb 6.7  - would transfuse PRBCs today     Pseudomonas bacteremia:  - on zosyn  - per ID       Interval History:  Seen and examined. Feeling ok. Cr stable at 1.9. Voiding ok. Appears to be emptying bladder. No cp, sob, n/v/d reported. Current Medications: all current  Medications have been eviewed in EPIC  Review of Systems: Pertinent items are noted in HPI. Objective:  Vitals:    Vitals:    07/21/20 0412 07/21/20 0629 07/21/20 0656 07/21/20 0830   BP:       Pulse: 90  90 98   Resp: 18 20 22   Temp: 98.2 °F (36.8 °C)   97.9 °F (36.6 °C)   SpO2: 98%  98% 97%   Weight:  81.3 kg (179 lb 3.7 oz)     Height:         Intake and Output:  07/21 0701 - 07/21 1900  In: 480 [P.O.:480]  Out: -   07/19 1901 - 07/21 0700  In: 1950 [P.O.:1200;  I.V.:750]  Out: 1345 [Urine:1345]    Physical Examination:  Pt intubated     No  General: NAD,Conversant   Neck:  Supple, no mass  Resp:  Lungs CTA B/L, no wheezing , normal respiratory effort  CV:  RRR,  + VAD sounds, no LE edema  GI:  Soft, NT, + Bowel sounds, no hepatosplenomegaly  Neurologic:  Non focal  Psych:             AAO x 3 appropriate affect   Skin:  No Rash  :  No neal    []    High complexity decision making was performed  []    Patient is at high-risk of decompensation with multiple organ involvement    Lab Data Personally Reviewed: I have reviewed all the pertinent labs, microbiology data and radiology studies during assessment. Recent Labs     07/21/20  0453 07/20/20  1544 07/20/20  0113 07/20/20  0112 07/19/20  0438   NA  --  136 136  --  138  139   K  --  4.1 4.2  --  4.1  4.2   CL  --  105 107  --  107  107   CO2  --  25 25  --  24  24   GLU  --  104* 108*  --  107*  104*   BUN  --  25* 26*  --  27*  26*   CREA  --  1.79* 1.91*  --  1.88*  1.86*   CA  --  8.4* 8.0*  --  8.4*  8.2*   MG 2.5* 2.6*  --  2.6*  --    PHOS  --   --  3.5  --   --    ALB 3.2*  3.1* 2.9* 2.9*  --  2.6*  2.7*   ALT 9*  10* 10*  --   --  11*  10*     Recent Labs     07/21/20  0453 07/19/20  1055   WBC 5.8 5.2   HGB 6.7* 7.8*   HCT 21.8* 26.2*    186     No results found for: SDES  Lab Results   Component Value Date/Time    Culture result: PENDING 07/20/2020 02:20 PM    Culture result: (A) 07/19/2020 04:38 AM     PSEUDOMONAS SPECIES ISOLATED FROM 2 OF 4 BOTTLES DRAWN, EACH FROM A DIFFERENT SITE. .. LFA AND RA. PLEASE REFER TO PREVIOUS [facility] [id number], COLLECTED 7/17/20, FOR FURTHER SPECIATION AND SUSCEPTIBILITIES    Culture result: (A) 07/19/2020 04:38 AM     PRELIMINARY REPORT OF GRAM NEGATIVE RODS GROWING IN 2 OF 4 BOTTLES DRAWN . ..(SITES= LFA AND R ARM) CALLED TO AND READ BACK BY DIANA POWELL ([place]) ON 07/20/2020 AT 0700 BY MARIA A    Culture result: REMAINING BOTTLE(S) HAS/HAVE NO GROWTH SO FAR 07/19/2020 04:38 AM     Recent Results (from the past 24 hour(s))   CULTURE, WOUND W GRAM STAIN    Collection Time: 07/20/20  2:20 PM    Specimen: Wound Drainage   Result Value Ref Range    Special Requests: NO SPECIAL REQUESTS      GRAM STAIN NO ORGANISMS SEEN      Culture result: PENDING    MAGNESIUM    Collection Time: 07/20/20  3:44 PM   Result Value Ref Range    Magnesium 2.6 (H) 1.6 - 2.4 mg/dL   METABOLIC PANEL, COMPREHENSIVE    Collection Time: 07/20/20  3:44 PM   Result Value Ref Range    Sodium 136 136 - 145 mmol/L    Potassium 4.1 3.5 - 5.1 mmol/L Chloride 105 97 - 108 mmol/L    CO2 25 21 - 32 mmol/L    Anion gap 6 5 - 15 mmol/L    Glucose 104 (H) 65 - 100 mg/dL    BUN 25 (H) 6 - 20 MG/DL    Creatinine 1.79 (H) 0.70 - 1.30 MG/DL    BUN/Creatinine ratio 14 12 - 20      GFR est AA 46 (L) >60 ml/min/1.73m2    GFR est non-AA 38 (L) >60 ml/min/1.73m2    Calcium 8.4 (L) 8.5 - 10.1 MG/DL    Bilirubin, total 0.6 0.2 - 1.0 MG/DL    ALT (SGPT) 10 (L) 12 - 78 U/L    AST (SGOT) 9 (L) 15 - 37 U/L    Alk. phosphatase 101 45 - 117 U/L    Protein, total 7.4 6.4 - 8.2 g/dL    Albumin 2.9 (L) 3.5 - 5.0 g/dL    Globulin 4.5 (H) 2.0 - 4.0 g/dL    A-G Ratio 0.6 (L) 1.1 - 2.2     DIGOXIN    Collection Time: 07/21/20  4:49 AM   Result Value Ref Range    Digoxin level 0.8 (L) 0.90 - 2.00 NG/ML   HEPATIC FUNCTION PANEL    Collection Time: 07/21/20  4:53 AM   Result Value Ref Range    Protein, total 7.2 6.4 - 8.2 g/dL    Albumin 3.1 (L) 3.5 - 5.0 g/dL    Globulin 4.1 (H) 2.0 - 4.0 g/dL    A-G Ratio 0.8 (L) 1.1 - 2.2      Bilirubin, total 0.7 0.2 - 1.0 MG/DL    Bilirubin, direct 0.2 0.0 - 0.2 MG/DL    Alk.  phosphatase 91 45 - 117 U/L    AST (SGOT) 6 (L) 15 - 37 U/L    ALT (SGPT) 10 (L) 12 - 78 U/L   CBC W/O DIFF    Collection Time: 07/21/20  4:53 AM   Result Value Ref Range    WBC 5.8 4.1 - 11.1 K/uL    RBC 2.23 (L) 4.10 - 5.70 M/uL    HGB 6.7 (L) 12.1 - 17.0 g/dL    HCT 21.8 (L) 36.6 - 50.3 %    MCV 97.8 80.0 - 99.0 FL    MCH 30.0 26.0 - 34.0 PG    MCHC 30.7 30.0 - 36.5 g/dL    RDW 15.7 (H) 11.5 - 14.5 %    PLATELET 597 503 - 792 K/uL    MPV 9.7 8.9 - 12.9 FL    NRBC 0.0 0  WBC    ABSOLUTE NRBC 0.00 0.00 - 0.01 K/uL   FERRITIN    Collection Time: 07/21/20  4:53 AM   Result Value Ref Range    Ferritin 511 (H) 26 - 388 NG/ML   NT-PRO BNP    Collection Time: 07/21/20  4:53 AM   Result Value Ref Range    NT pro-BNP 21,599 (H) <125 PG/ML   HEPATIC FUNCTION PANEL    Collection Time: 07/21/20  4:53 AM   Result Value Ref Range    Protein, total 6.7 6.4 - 8.2 g/dL    Albumin 3.2 (L) 3.5 - 5.0 g/dL    Globulin 3.5 2.0 - 4.0 g/dL    A-G Ratio 0.9 (L) 1.1 - 2.2      Bilirubin, total 0.7 0.2 - 1.0 MG/DL    Bilirubin, direct 0.3 (H) 0.0 - 0.2 MG/DL    Alk. phosphatase 89 45 - 117 U/L    AST (SGOT) 7 (L) 15 - 37 U/L    ALT (SGPT) 9 (L) 12 - 78 U/L   MAGNESIUM    Collection Time: 07/21/20  4:53 AM   Result Value Ref Range    Magnesium 2.5 (H) 1.6 - 2.4 mg/dL   PROCALCITONIN    Collection Time: 07/21/20  4:53 AM   Result Value Ref Range    Procalcitonin 0.42 ng/mL   LD    Collection Time: 07/21/20  4:53 AM   Result Value Ref Range     85 - 241 U/L   IRON PROFILE    Collection Time: 07/21/20  4:53 AM   Result Value Ref Range    Iron 84 35 - 150 ug/dL    TIBC 146 (L) 250 - 450 ug/dL    Iron % saturation 58 (H) 20 - 50 %               Suresh Sellers MD  [address]  Phone - [phone]   Fax - [phone]  [email or web address]

## 2020-07-21 NOTE — PROGRESS NOTES
[address] [facility] Adult  Hospitalist Group                                                                                          Hospitalist Progress Note  Merlene Kaur MD  Answering service: [phone] OR [phone] from in house phone        Date of Service:  2020  NAME:  Dmitri Hargrove  :  1950  MRN:  [id number]      Admission Summary:   CC: confusion     71year old male with past medical history heart failure, LVEF 15%, on LVAD, paroxysmal atrial fibrillation, BPH, recent admission for hematuria, presenting to [facility] with progressive and worsening confusion. Patient seen and examined and currently tangential in thought process. Discussed with wife Julia Wolfe, via phone. Per wife, patient has been progressively confused during the past week. Reports he has not slept for approximately 3 nights. At times, patient is coherent but then intermittently confused. He has been doing things out of the ordinary such as calling his children early in the morning or late at night. Also calling his siblings multiple times. Per wife, patient is Buddhist but usually does not speak about God to others. During my encounter, patient continuously spoke about the Mika Hiss in his past.  Patient alert to name, place, situation but not year. Denies chest pain, shortness of breath, cough, fevers, chills, sweats, urinary frequency, dysuria, abdominal pain. Endorses hematuria     In the ED, labs remarkable for UA moderate leukocyte esterase, negative nitrites, 1+ bacteria, >100 RBCs, Hgb 7.9, Cr 2.09. Patient given zosyn. Interval history / Subjective: Follow up confusion. Patient seen and examined at the bedside. Labs, images and notes reviewed  Discussed with nursing staff, orders reviewed. Plan discussed with patient/Family  Comfortable in sitting up in the chair. Denied any discomfort. Urinating well. No CP, S OB, N/V/D. No other overnight events or concerns. Driveline site drainage. D/W with nurse. Cultures collected. Wound care and coordination with cardiovascular surgery. Assessment & Plan:     #Acute encephalopathy, likely metabolic from infection, back to baseline   #UTI, recurrent, resistant. Urology on board  #GNR bacteremia, 1 out of 4 bottles collected on 7/17/2020, preliminarily report suggest Pseudomonas. ID on board. 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 Advanced heart failure cardiology team on board  #Acute renal failure, creatinine 1.88 on admission, nephrology on board  #Paroxysmal atrial fibrillation, on digoxin  #History of hematuria and anemia, Coumadin discontinued on previous hospitalization due to same  #Hyponatremia  #BPH  #Seizure disorder  #Depression  #Supratherapeutic level of digoxin    -Admitted to CVSU, telemetry, ALOS greater than 2 MN  -Cardiology [facility] team, urology, ID, nephrology consulted  -IV Zosyn. ID okay with current antibiotics at present. Defer further based on culture results to ID  -Follow blood cultures closely  -Repeat blood cultures 7/19/2020 positive  -Wound culture from right groin driveline site. Wound care consult. Coordinate with cardiovascular surgery  -Further antibiotic adjustment per ID and culture results  -Rule out urinary obstruction. No need of Lizama catheterization per urology.  -Serial bladder scans for postvoid residual and PRN straight cath if more than 500 mL per urology.   -Consideration for outpatient cystoscopy. Defer to Dr. Umair Garza, urology on further inpatient follow-up during this hospitalization  -Hold diuretics. Follow cardiology and nephrology recommendation to resume  -Albumin IV infusions  -IV iron infusion per nephrology  -Follow BMP, urine lytes  -Dig level 0.9.   Resume likely tomorrow per cardiology [facility] team  -Renal ultrasound  -Echocardiogram done 7/18/2020, noted  Final result  ·   Contrast used: DEFINITY. · Image quality for this study was technically difficult. · Severely dilated left ventricle. Wall thickness appears thin. Severe systolic function. Estimated left ventricular ejection fraction is <15%. Visually measured ejection fraction. · Moderately dilated left atrium. · Moderately dilated right ventricle. Moderately to severely reduced systolic function. · Dilated right atrium. · Moderate mitral valve prolapse. Guarded to poor prognosis. High risk patient    Code status: Full code  DVT prophylaxis: SCDs    Care Plan discussed with: Patient/Family and Nurse  Anticipated Disposition: Home w/Family  Anticipated Discharge: Greater than 48 hours     Hospital Problems  Date Reviewed: 3/23/2020          Codes Class Noted POA    Acute encephalopathy ICD-10-CM: G93.40  ICD-9-CM: 348.30  7/17/2020 Unknown                Review of Systems:   A comprehensive review of systems was negative except for that written in the HPI. Vital Signs:    Last 24hrs VS reviewed since prior progress note. Most recent are:  Visit Vitals  /80   Pulse 90   Temp 97.7 °F (36.5 °C)   Resp 20   Ht 6' 2\" (1.88 m)   Wt 80.8 kg (178 lb 2.1 oz)   SpO2 100%   BMI 22.87 kg/m²         Intake/Output Summary (Last 24 hours) at 7/20/2020 2032  Last data filed at 7/20/2020 1855  Gross per 24 hour   Intake 1300 ml   Output 805 ml   Net 495 ml        Physical Examination:             Constitutional:  No acute distress, cooperative, pleasant    ENT:  Oral mucosa moist, oropharynx benign. Resp:  CTA bilaterally. No wheezing/rhonchi/rales. No accessory muscle use   CV:  Regular rhythm, normal rate, no murmurs, gallops, rubs    GI:  Soft, non distended, non tender. normoactive bowel sounds, no hepatosplenomegaly     Musculoskeletal:  No edema, warm, 2+ pulses throughout    Neurologic:  Moves all extremities.   AAOx3, CN II-XII reviewed     Psych:  Good insight, Not anxious nor agitated. Skin:  Good turgor, no rashes or ulcers  Hematologic/Lymphatic/Immunlogic:  No jaundice nor lymph node swelling  :  Normal genitalia. and no gross hematuria  Eyes:  EOMI. Anicteric sclerae, PERRL. Data Review:    Review and/or order of clinical lab test  Review and/or order of tests in the radiology section of [facility]  Review and/or order of tests in the medicine section of [facility]    Ct Head Wo Cont    Result Date: 7/17/2020  IMPRESSION: 1. No acute intracranial abnormality. 2. Aerated secretions in the left maxillary sinus can be seen with acute sinusitis. Us Retroperitoneum Comp    Result Date: 7/18/2020  IMPRESSION: Medical renal disease. Small left kidney. No evidence for hydronephrosis or mass lesion. Labs:     Recent Labs     07/19/20  1055 07/18/20  0448   WBC 5.2 5.6   HGB 7.8* 7.8*   HCT 26.2* 25.1*    208     Recent Labs     07/20/20  1544 07/20/20  0113 07/20/20  0112 07/19/20  0438 07/18/20  0448    136  --  138  139 136   K 4.1 4.2  --  4.1  4.2 4.0    107  --  107  107 104   CO2 25 25  --  24  24 25   BUN 25* 26*  --  27*  26* 30*   CREA 1.79* 1.91*  --  1.88*  1.86* 1.99*   * 108*  --  107*  104* 98   CA 8.4* 8.0*  --  8.4*  8.2* 8.7   MG 2.6*  --  2.6*  --  2.7*   PHOS  --  3.5  --   --  4.1     Recent Labs     07/20/20  1544 07/20/20  0113 07/19/20  0438 07/18/20  0448   ALT 10*  --  11*  10* 14     --  99  100 115   TBILI 0.6  --  0.5  0.5 0.6   TP 7.4  --  6.9  6.5 7.4   ALB 2.9* 2.9* 2.6*  2.7* 2.6*   GLOB 4.5*  --  4.3*  3.8 4.8*     No results for input(s): INR, PTP, APTT, INREXT, INREXT in the last 72 hours. Recent Labs     07/18/20  0448   TIBC 183*   PSAT 10*   FERR 222      Lab Results   Component Value Date/Time    Folate 16.5 01/16/2020 04:57 AM      No results for input(s): PH, PCO2, PO2 in the last 72 hours. No results for input(s): CPK, CKNDX, TROIQ in the last 72 hours.     No lab exists for component: CPKMB  Lab Results   Component Value Date/Time    Cholesterol, total 90 10/26/2019 04:09 AM    HDL Cholesterol 21 10/26/2019 04:09 AM    LDL, calculated 56.2 10/26/2019 04:09 AM    Triglyceride 64 10/26/2019 04:09 AM    CHOL/HDL Ratio 4.3 10/26/2019 04:09 AM     Lab Results   Component Value Date/Time    Glucose (POC) 119 (H) 07/18/2020 09:42 PM    Glucose (POC) 124 (H) 07/18/2020 04:45 PM    Glucose (POC) 116 (H) 07/18/2020 11:33 AM    Glucose (POC) 109 (H) 07/18/2020 06:13 AM    Glucose (POC) 99 01/27/2020 11:11 AM     Lab Results   Component Value Date/Time    Color DARK YELLOW 07/17/2020 11:51 AM    Appearance CLOUDY (A) 07/17/2020 11:51 AM    Specific gravity 1.016 07/17/2020 11:51 AM    Specific gravity 1.015 10/31/2019 11:00 AM    pH (UA) 5.0 07/17/2020 11:51 AM    Protein 100 (A) 07/17/2020 11:51 AM    Glucose Negative 07/17/2020 11:51 AM    Ketone Negative 07/17/2020 11:51 AM    Bilirubin Negative 07/17/2020 11:51 AM    Urobilinogen 0.2 07/17/2020 11:51 AM    Nitrites Negative 07/17/2020 11:51 AM    Leukocyte Esterase MODERATE (A) 07/17/2020 11:51 AM    Epithelial cells FEW 07/17/2020 11:51 AM    Bacteria 1+ (A) 07/17/2020 11:51 AM    WBC 10-20 07/17/2020 11:51 AM    RBC >100 (H) 07/17/2020 11:51 AM         Medications Reviewed:     Current Facility-Administered Medications   Medication Dose Route Frequency    albumin human 25% (BUMINATE) solution 25 g  25 g IntraVENous Q6H    hydrALAZINE (APRESOLINE) 20 mg/mL injection 5 mg  5 mg IntraVENous Q4H PRN    sodium chloride (NS) flush 5-40 mL  5-40 mL IntraVENous Q8H    sodium chloride (NS) flush 5-40 mL  5-40 mL IntraVENous PRN    acetaminophen (TYLENOL) tablet 650 mg  650 mg Oral Q4H PRN    diphenhydrAMINE (BENADRYL) injection 12.5 mg  12.5 mg IntraVENous Q4H PRN    ondansetron (ZOFRAN) injection 4 mg  4 mg IntraVENous Q4H PRN    bisacodyL (DULCOLAX) tablet 5 mg  5 mg Oral DAILY PRN    [Held by provider] digoxin (LANOXIN) tablet 0.125 mg  0.125 mg Oral EVERY OTHER DAY    finasteride (PROSCAR) tablet 5 mg  5 mg Oral DAILY    lactobac ac& pc-s.therm-b.anim (TIAN Q/RISAQUAD)  1 Cap Oral DAILY    levETIRAcetam (KEPPRA) tablet 250 mg  250 mg Oral BID    magnesium oxide (MAG-OX) tablet 800 mg  800 mg Oral BID    tamsulosin (FLOMAX) capsule 0.8 mg  0.8 mg Oral QHS    venlafaxine-SR (EFFEXOR-XR) capsule 150 mg  150 mg Oral DAILY WITH BREAKFAST    piperacillin-tazobactam (ZOSYN) 3.375 g in 0.9% sodium chloride (MBP/ADV) 100 mL  3.375 g IntraVENous Q8H     ______________________________________________________________________  EXPECTED LENGTH OF STAY: - - -  ACTUAL LENGTH OF STAY:          0                 Merlene Kaur MD

## 2020-07-21 NOTE — PROGRESS NOTES
0730:  Bedside shift change report given to Maude Giron RN (oncoming nurse) by Elisha Clarke RN (offgoing nurse). Report included the following information SBAR, Kardex, Procedure Summary, Intake/Output, MAR, and Recent Results. 0915:  Patient up to restroom, had loose bowel movement, voided in toilet while having a bowel movement - unable to rack urine. 1100:  Patient switched over to batteries, RNs attempted to walk patient with walker, patient only able to ambulate 6 feet. Became lightheaded and short of breath, returned to recliner, O2 sats 90% on room air - 95% with 2L NC, MAP 82. [facility] made aware. Type and Cross drawn and sent to lab along with repeat H&H.    1200:  Repeat H&H and metabolic panel results read to Nat Mena NP. Orders received to hold off blood transfusion. [id number]  Patient provided urine sample, collected and sent to lab.    1930:  Bedside shift change report given to Ti Solorzano RN (oncoming nurse) by Maude Giron RN (offgoing nurse). Report included the following information SBAR, Kardex, Procedure Summary, Intake/Output, MAR and Recent Results. Problem: Falls - Risk of  Goal: *Absence of Falls  Description: Document Mj Luz Fall Risk and appropriate interventions in the flowsheet.   Outcome: Progressing Towards Goal  Note: Fall Risk Interventions:  Mobility Interventions: Patient to call before getting OOB, Utilize walker, cane, or other assistive device, Utilize gait belt for transfers/ambulation         Medication Interventions: Teach patient to arise slowly, Utilize gait belt for transfers/ambulation, Patient to call before getting OOB    Elimination Interventions: Call light in reach, Patient to call for help with toileting needs, Toileting schedule/hourly rounds              Problem: LVAD: Discharge Outcomes  Goal: *Hemodynamically stable  Outcome: Progressing Towards Goal     Problem: Pressure Injury - Risk of  Goal: *Prevention of pressure injury  Description: Document Mich Scale and appropriate interventions in the flowsheet. Outcome: Progressing Towards Goal  Note: Pressure Injury Interventions: Activity Interventions: Increase time out of bed, Pressure redistribution bed/mattress(bed type)    Mobility Interventions: HOB 30 degrees or less, Pressure redistribution bed/mattress (bed type), Turn and reposition approx.  every two hours(pillow and wedges)    Nutrition Interventions: Document food/fluid/supplement intake

## 2020-07-21 NOTE — PROGRESS NOTES
[address] [facility] Adult  Hospitalist Group                                                                                          Hospitalist Progress Note  Jean Paul Valencia MD  Answering service: [phone] OR [phone] from in house phone        Date of Service:  2020  NAME:  Walter Bajwa  :  1950  MRN:  [id number]      Admission Summary:   CC: confusion     71year old male with past medical history heart failure, LVEF 15%, on LVAD, paroxysmal atrial fibrillation, BPH, recent admission for hematuria, presenting to [facility] with progressive and worsening confusion. Patient seen and examined and currently tangential in thought process. Discussed with wife Wali Peterson, via phone. Per wife, patient has been progressively confused during the past week. Reports he has not slept for approximately 3 nights. At times, patient is coherent but then intermittently confused. He has been doing things out of the ordinary such as calling his children early in the morning or late at night. Also calling his siblings multiple times. Per wife, patient is Adventist but usually does not speak about God to others. During my encounter, patient continuously spoke about the Lurlean Cue in his past.  Patient alert to name, place, situation but not year. Denies chest pain, shortness of breath, cough, fevers, chills, sweats, urinary frequency, dysuria, abdominal pain. Endorses hematuria     In the ED, labs remarkable for UA moderate leukocyte esterase, negative nitrites, 1+ bacteria, >100 RBCs, Hgb 7.9, Cr 2.09. Patient given zosyn. Interval history / Subjective: Follow up confusion. Patient seen and examined at the bedside. Labs, images and notes reviewed  Discussed with nursing staff, orders reviewed. Plan discussed with patient/Family  Feeling okay. Urinating well. No CP, SOB, N/V. No other concerns or questions. D/W with [facility] team.  Possible discharge consideration later today versus tomorrow. Plan to get PICC line if nephrology is okay. D/W with nephrology team.  1 PRBC with IV iron infusion. Assessment & Plan:     #Acute encephalopathy, likely metabolic from infection, back to baseline   #UTI, recurrent, resistant. Urology on board  #GNR bacteremia, 1 out of 4 bottles collected on 7/17/2020, preliminarily report suggest Pseudomonas. ID on board. 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 Advanced heart failure cardiology team on board  #Acute renal failure, creatinine 1.88 on admission, nephrology on board  #Paroxysmal atrial fibrillation, on digoxin  #History of hematuria and anemia, Coumadin discontinued on previous hospitalization due to same  #Anemia of chronic disease and Iron deficiency as well  #Hyponatremia  #BPH  #Seizure disorder  #Depression  #Supratherapeutic level of digoxin    -Admitted to CVSU, telemetry, ALOS greater than 2 MN  -Cardiology [facility] team, urology, ID, nephrology consulted  -IV Zosyn. ID okay with current antibiotics at present. Defer further based on culture results to ID  -PICC line versus line access per advanced heart failure team and nephrology  -Antibiotics per ID  -Follow blood cultures closely  -Repeat blood cultures 7/19/2020 positive  -Wound culture from right groin driveline site. Wound care consult. Coordinate with cardiovascular surgery  -Further antibiotic adjustment per ID and culture results  -Rule out urinary obstruction. No need of Lizama catheterization per urology.  -Serial bladder scans for postvoid residual and PRN straight cath if more than 500 mL per urology.   -Consideration for outpatient cystoscopy. Defer to Dr. Josephine Ron, urology on further inpatient follow-up during this hospitalization  -Hold diuretics.   Follow cardiology and nephrology recommendation to resume  -Albumin IV infusions  -IV iron infusion per nephrology  -Follow BMP, urine lytes  -Dig level 0.9. Resume likely tomorrow per cardiology [facility] team  -Renal ultrasound  -Echocardiogram done 7/18/2020, noted  Final result  ·   Contrast used: DEFINITY. · Image quality for this study was technically difficult. · Severely dilated left ventricle. Wall thickness appears thin. Severe systolic function. Estimated left ventricular ejection fraction is <15%. Visually measured ejection fraction. · Moderately dilated left atrium. · Moderately dilated right ventricle. Moderately to severely reduced systolic function. · Dilated right atrium. · Moderate mitral valve prolapse.   -Possible DC soon, may be tomorrow/later today if all arrangements done. Appreciate [facility]  on this. Guarded to poor prognosis. High risk patient    Code status: Full code  DVT prophylaxis: SCDs    Care Plan discussed with: Patient/Family and Nurse  Anticipated Disposition: Home w/Family  Anticipated Discharge: Greater than 48 hours     Hospital Problems  Date Reviewed: 3/23/2020          Codes Class Noted POA    Acute encephalopathy ICD-10-CM: G93.40  ICD-9-CM: 348.30  7/17/2020 Unknown                Review of Systems:   A comprehensive review of systems was negative except for that written in the HPI. Vital Signs:    Last 24hrs VS reviewed since prior progress note. Most recent are:  Visit Vitals  /80   Pulse 98   Temp 97.9 °F (36.6 °C)   Resp 22   Ht 6' 2\" (1.88 m)   Wt 81.3 kg (179 lb 3.7 oz)   SpO2 97%   BMI 23.01 kg/m²         Intake/Output Summary (Last 24 hours) at 7/21/2020 0840  Last data filed at 7/21/2020 0830  Gross per 24 hour   Intake 2280 ml   Output 995 ml   Net 1285 ml        Physical Examination:             Constitutional:  No acute distress, cooperative, pleasant    ENT:  Oral mucosa moist, oropharynx benign. Resp:  CTA bilaterally. No wheezing/rhonchi/rales.  No accessory muscle use   CV:  Regular rhythm, normal rate, no murmurs, gallops, rubs    GI:  Soft, non distended, non tender. normoactive bowel sounds, no hepatosplenomegaly     Musculoskeletal:  No edema, warm, 2+ pulses throughout    Neurologic:  Moves all extremities. AAOx3, CN II-XII reviewed     Psych:  Good insight, Not anxious nor agitated. Skin:  Good turgor, no rashes or ulcers  Hematologic/Lymphatic/Immunlogic:  No jaundice nor lymph node swelling  :  Normal genitalia. and no gross hematuria  Eyes:  EOMI. Anicteric sclerae, PERRL. Data Review:    Review and/or order of clinical lab test  Review and/or order of tests in the radiology section of [facility]  Review and/or order of tests in the medicine section of [facility]    Ct Head Wo Cont    Result Date: 7/17/2020  IMPRESSION: 1. No acute intracranial abnormality. 2. Aerated secretions in the left maxillary sinus can be seen with acute sinusitis. Us Retroperitoneum Comp    Result Date: 7/18/2020  IMPRESSION: Medical renal disease. Small left kidney. No evidence for hydronephrosis or mass lesion. Labs:     Recent Labs     07/19/20  1055   WBC 5.2   HGB 7.8*   HCT 26.2*        Recent Labs     07/20/20  1544 07/20/20  0113 07/20/20  0112 07/19/20  0438    136  --  138  139   K 4.1 4.2  --  4.1  4.2    107  --  107  107   CO2 25 25  --  24  24   BUN 25* 26*  --  27*  26*   CREA 1.79* 1.91*  --  1.88*  1.86*   * 108*  --  107*  104*   CA 8.4* 8.0*  --  8.4*  8.2*   MG 2.6*  --  2.6*  --    PHOS  --  3.5  --   --      Recent Labs     07/21/20  0453 07/20/20  1544 07/20/20  0113 07/19/20  0438   ALT 10* 10*  --  11*  10*   AP 91 101  --  99  100   TBILI 0.7 0.6  --  0.5  0.5   TP 7.2 7.4  --  6.9  6.5   ALB 3.1* 2.9* 2.9* 2.6*  2.7*   GLOB 4.1* 4.5*  --  4.3*  3.8     No results for input(s): INR, PTP, APTT, INREXT, INREXT in the last 72 hours. No results for input(s): FE, TIBC, PSAT, FERR in the last 72 hours.    Lab Results   Component Value Date/Time    Folate 16.5 01/16/2020 04:57 AM      No results for input(s): PH, PCO2, PO2 in the last 72 hours. No results for input(s): CPK, CKNDX, TROIQ in the last 72 hours.     No lab exists for component: CPKMB  Lab Results   Component Value Date/Time    Cholesterol, total 90 10/26/2019 04:09 AM    HDL Cholesterol 21 10/26/2019 04:09 AM    LDL, calculated 56.2 10/26/2019 04:09 AM    Triglyceride 64 10/26/2019 04:09 AM    CHOL/HDL Ratio 4.3 10/26/2019 04:09 AM     Lab Results   Component Value Date/Time    Glucose (POC) 119 (H) 07/18/2020 09:42 PM    Glucose (POC) 124 (H) 07/18/2020 04:45 PM    Glucose (POC) 116 (H) 07/18/2020 11:33 AM    Glucose (POC) 109 (H) 07/18/2020 06:13 AM    Glucose (POC) 99 01/27/2020 11:11 AM     Lab Results   Component Value Date/Time    Color DARK YELLOW 07/17/2020 11:51 AM    Appearance CLOUDY (A) 07/17/2020 11:51 AM    Specific gravity 1.016 07/17/2020 11:51 AM    Specific gravity 1.015 10/31/2019 11:00 AM    pH (UA) 5.0 07/17/2020 11:51 AM    Protein 100 (A) 07/17/2020 11:51 AM    Glucose Negative 07/17/2020 11:51 AM    Ketone Negative 07/17/2020 11:51 AM    Bilirubin Negative 07/17/2020 11:51 AM    Urobilinogen 0.2 07/17/2020 11:51 AM    Nitrites Negative 07/17/2020 11:51 AM    Leukocyte Esterase MODERATE (A) 07/17/2020 11:51 AM    Epithelial cells FEW 07/17/2020 11:51 AM    Bacteria 1+ (A) 07/17/2020 11:51 AM    WBC 10-20 07/17/2020 11:51 AM    RBC >100 (H) 07/17/2020 11:51 AM         Medications Reviewed:     Current Facility-Administered Medications   Medication Dose Route Frequency    albumin human 25% (BUMINATE) solution 25 g  25 g IntraVENous Q6H    hydrALAZINE (APRESOLINE) 20 mg/mL injection 5 mg  5 mg IntraVENous Q4H PRN    sodium chloride (NS) flush 5-40 mL  5-40 mL IntraVENous Q8H    sodium chloride (NS) flush 5-40 mL  5-40 mL IntraVENous PRN    acetaminophen (TYLENOL) tablet 650 mg  650 mg Oral Q4H PRN    diphenhydrAMINE (BENADRYL) injection 12.5 mg  12.5 mg IntraVENous Q4H PRN    ondansetron (ZOFRAN) injection 4 mg  4 mg IntraVENous Q4H PRN    bisacodyL (DULCOLAX) tablet 5 mg  5 mg Oral DAILY PRN    [Held by provider] digoxin (LANOXIN) tablet 0.125 mg  0.125 mg Oral EVERY OTHER DAY    finasteride (PROSCAR) tablet 5 mg  5 mg Oral DAILY    lactobac ac& pc-s.therm-b.anim (TIAN Q/RISAQUAD)  1 Cap Oral DAILY    levETIRAcetam (KEPPRA) tablet 250 mg  250 mg Oral BID    magnesium oxide (MAG-OX) tablet 800 mg  800 mg Oral BID    tamsulosin (FLOMAX) capsule 0.8 mg  0.8 mg Oral QHS    venlafaxine-SR (EFFEXOR-XR) capsule 150 mg  150 mg Oral DAILY WITH BREAKFAST    piperacillin-tazobactam (ZOSYN) 3.375 g in 0.9% sodium chloride (MBP/ADV) 100 mL  3.375 g IntraVENous Q8H     ______________________________________________________________________  EXPECTED LENGTH OF STAY: - - -  ACTUAL LENGTH OF STAY:          1                 William Fisher MD

## 2020-07-21 NOTE — PROGRESS NOTES
Comprehensive Nutrition Assessment    Type and Reason for Visit:      Nutrition Recommendations/Plan:    1. Encourage PO with all meals   - family may bring foods from home  2. Consider appetite stimulant (megace?) if po remains poor for >1 week  3. Daily weights on standing scale    Nutrition Assessment:    Pt admitted for AMS. PMHx: HTN, CKD, CHF, depression, others noted. S/p LVAD placement on 11/18. CVA and bacteremia post-op with extended stay. Fatigue and AMS at home PTA. Driveline infection noted with bacteremia, ID following. JUAN J on CKD improving with hydration, renal following. IV iron given with anemia noted. Pt visited today. Very well know from previous admits. Has done well with wt gain since previous admits with wt stable around 185# earlier this summer. Since last admit just 3 weeks ago pt has had 3% wt loss. He notes appetite down with confusion and active infection impacting his desire to eat. Declining supplements this admit but agreeable to yogurt BID as snacks to optimize PO. Pt aware of importance of nutrition and motivated to regain lost wt. Does best with breakfast meal, encouraged him to have family bring in food and diet liberalized for more options. Wears dentures but no problem with these.      Malnutrition Assessment:  Malnutrition Status:  Mild malnutrition    Context:  Acute illness     Findings of the 6 clinical characteristics of malnutrition:   Energy Intake:  1 - 75% or less of est energy req for 7 or more days    Nutritionally Significant Medications: albumin, iron sucrose, soren-Q, keppra, mag-ox, zosyn; PRN: zofran, dulcolax    Estimated Daily Nutrient Needs:  Energy (kcal):  4902-0865(MSJ x 1.3-1.4)  Protein (g):  98-114g (1.201.4g/kg)       Fluid (ml/day):  2140 - 1ml/kcal or per renal/cardio    Nutrition Related Findings:  BM 7/21 loose, trace edema    Wounds:  None       Current Nutrition Therapies:   Diet: cardiac  Meal intake:   Patient Vitals for the past 168 hrs: % Diet Eaten   07/21/20 0830 50 %   07/20/20 1326 10 %   07/20/20 0935 95 %   07/19/20 1740 50 %   07/19/20 1218 75 %   07/19/20 0827 50 %   07/18/20 2012 0 %   07/18/20 1243 10 %   07/18/20 0917 100 %     Anthropometric Measures:  · Height:  6' 2\" (188 cm)  · Current Body Wt:  81.2 kg (179 lb)   · Admission Body Wt:       · Usual Body Wt:  185 lb     · Ideal Body Wt:  190:  94.2 %     Nutrition Diagnosis:   · Inadequate protein-energy intake related to cognitive or neurological impairment(AMS 2/2 infection) as evidenced by weight loss, intake 26-50%, poor intake prior to admission    Nutrition Interventions:   Food and/or Nutrient Delivery: Modify current diet, Snacks (specify)(3-4g NA, yogurt BID for snack)  Nutrition Education and Counseling: No recommendations at this time  Coordination of Nutrition Care: No recommendation at this time    Goals:  Consumption of at least 75% meals and 2 snacks per day in 5-7 days; wt maintenance       Nutrition Monitoring and Evaluation:   Behavioral-Environmental Outcomes:    Food/Nutrient Intake Outcomes: Food and nutrient intake  Physical Signs/Symptoms Outcomes: Weight    Discharge Planning:     Too soon to determine     Omkar Ware, RD [address] , Pager #[phone] or via ZEB

## 2020-07-21 NOTE — PROGRESS NOTES
ID Progress Note  2020    Subjective:     Doing better overall, still fatigues easily--somewhat depressed, no physical complaints, however    Objective:     Antibiotics:  1. Zosyn       Vitals:   Visit Vitals  /80   Pulse 95   Temp 98 °F (36.7 °C)   Resp 20   Ht 6' 2\" (1.88 m)   Wt 81.3 kg (179 lb 3.7 oz)   SpO2 95%   BMI 23.01 kg/m²        Tmax:  Temp (24hrs), Av °F (36.7 °C), Min:97.7 °F (36.5 °C), Max:98.2 °F (36.8 °C)      Exam:  Lungs:  clear to auscultation bilaterally  Heart:  regular rate and rhythm  Abdomen:  soft, non-tender. Bowel sounds normal. No masses,  no organomegaly    Labs:      Recent Labs     20  1057 20  0453 20  1544 20  0113 20  1055 20  0438   WBC  --  5.8  --   --  5.2  --    HGB 7.2* 6.7*  --   --  7.8*  --    PLT  --  178  --   --  186  --    BUN  --  21* 25* 26*  --  27*  26*   CREA  --  1.61* 1.79* 1.91*  --  1.88*  1.86*   AP  --  88  89  91 101  --   --  99  100   TBILI  --  0.7  0.7  0.7 0.6  --   --  0.5  0.5       Cultures:     No results found for: SDES  Lab Results   Component Value Date/Time    Culture result: LIGHT PROBABLE STAPHYLOCOCCUS AUREUS (A) 2020 02:20 PM    Culture result: (A) 2020 04:38 AM     PSEUDOMONAS SPECIES ISOLATED FROM 2 OF 4 BOTTLES DRAWN, EACH FROM A DIFFERENT SITE. .. LFA AND RA. PLEASE REFER TO PREVIOUS [facility] [id number], COLLECTED 20, FOR FURTHER SPECIATION AND SUSCEPTIBILITIES    Culture result: (A) 2020 04:38 AM     PRELIMINARY REPORT OF GRAM NEGATIVE RODS GROWING IN 2 OF 4 BOTTLES DRAWN . ..(SITES= LFA AND R ARM) CALLED TO AND READ BACK BY [place] ON 2020 AT 0700 BY VY    Culture result: REMAINING BOTTLE(S) HAS/HAVE NO GROWTH SO FAR 2020 04:38 AM       Radiology:     Line/Insert Date:           Assessment:     1. Drive line infection  2. Recurrent Pseudomonas bacteremia  3. Organism resistant to oral antibiotic options    Objective:     1.  Continue current therapy  2. Further management options include lifelong IV antibiotic therapy vs re-siting the LVAD--unfortunately, Pseudomonas is good at adapting to antibiotic agents  3.  Case discussed    Roman Corley MD

## 2020-07-21 NOTE — PROGRESS NOTES
Patient: Leora Valadez MRN: [id number]  SSN: [id number]    YOB: 1950  Age: 71 y.o. Sex: male        ADMITTED: 2020 to Madelyn Celis MD by William Fisher MD for Acute encephalopathy [G93.40]  Acute encephalopathy [G93.40]  POD# * No surgery found *     Racked urine is clear yellow to tea colored. No sign of active bleeding or clots noted. UA resent which showed LARGE blood and >100 RBCs. Hgb: 7.2    Patient reports overall feeling well. Reports diarrhea that is not dark/bloody. Order for occult blood, stool ordered. States his biggest complaint is nocturia, sometimes going q15 minutes, however in the hospital this has slowed down. All bladder scans have been <500cc, not requiring a straight cath.     Patient last had a cystoscopy on 10/25/19 with Dr. Pascual Chakraborty. Postoperative diagnosis reports catheter related cystitis changes of the posterior wall of the bladder but no suggestion of tumor or anything worrisome. There was no active bleeding. See full op note below.     afvss  WBC: wnl  Bcx: PSEUDOMONAS   Hgb: 7.2  Cr: 1.79  UA: 10-20 WBCs, >100 RBCs, 1+ bacteria  Ucx: no growth  +zosyn  +0.8mg flomax, finasteride  +venofer     Renal US: IMPRESSION: Medical renal disease. Small left kidney. No evidence for  hydronephrosis or mass lesion. 2020  CT abd/pelv wwo: IMPRESSION:     1. Findings consistent with acute cystitis. Bladder is partially decompressed  with Lizama catheter in place. 2. No evidence of urolithiasis. No evidence of solid renal, ureteral or bladder  mass. Multifocal cortical scarring throughout the left kidney, and cortical  scarring in the lower pole of the right kidney.   3. Mild pulmonary interstitial edema and small bilateral pleural effusions.       ===10/25/19 Op note===     [facility] - OP NOTES  Patient: Janie Steele  : 1950  Date of Service: 10/25/2019 2:14:00 PM     [address]  OPERATIVE REPORT     Name:  Princess Arora COREY  MR#:  [id number]  :  1950  ACCOUNT #:  [de-identified]  DATE OF SERVICE:  2019        PREOPERATIVE DIAGNOSIS:  Gross hematuria.     POSTOPERATIVE DIAGNOSIS/FINDINGS:  There were catheter related cystitis changes  of the posterior wall of the bladder but I did not see any suggestion of tumor  or anything worrisome. There was no active bleeding.     PROCEDURE PERFORMED:  Cystoscopy.     SURGEON:  Diya Ng MD     ASSISTANT:  None.     ANESTHESIA:  MAC.     COMPLICATIONS:  None.     SPECIMENS REMOVED:  None.     IMPLANTS:  None.     ESTIMATED BLOOD LOSS:  Minimal.     DRAINS:  An 18-Syriac Lizama catheter.     INDICATIONS FOR PROCEDURE:  The patient is a 61-year-old gentleman with heart  failure, who is being scheduled for placement of left ventricular assist device. He had significant gross hematuria earlier this hospitalization possibly related  to urinary tract infection. The urine has cleared but cystoscopy was  recommended to exclude any worrisome findings in the bladder to account for the  hematuria and he preferred to do that under anesthesia in case any additional  procedures were needed at the time. He was therefore transferred down from the  CCU for the procedure.     DESCRIPTION:  He was identified, as mentioned, escorted from the CCU into the  cystoscopy room. Monitored anesthesia care was administered by the Anesthesia  team, and he was then placed in dorsal lithotomy. His previous catheter was  removed. He was then prepped and draped in standard fashion. With a 21-Syriac  sheath and both of 30 and 70-degree lens, a cystoscopy was performed. The  anterior urethra was normal.  The prostate was short and small and did not  appear obstructive, however, there was prominent vasculature throughout the  prostate. The bladder was then examined closely with both lenses.   Isolated to  the posterior wall at the site of the catheter was what appeared to be catheter  related cystitis changes. I did not see anything that looked like tumor. The  bladder was moderately trabeculated throughout and there were few small  cellules. The trigone was normal.  Both ureteral orifices were normal.  I did  not see anything that I felt warranted biopsy or any additional procedures. I  removed the cystoscope and passed an 18-Afghan Lizama catheter. He tolerated  theprocedure well. Findings were conveyed to his wife.  Adriana Stark MD        [id number]  D:  2019 8:22  T:  2019 10:38  JOB #:  [id number]     Signed before import by Ceasar Najera MD  Filed automatically on 12/10/2019 at 10:15 AM    Vitals: Temp (24hrs), Av °F (36.7 °C), Min:97.7 °F (36.5 °C), Max:98.2 °F (36.8 °C)    Blood pressure 115/80, pulse 98, temperature 97.9 °F (36.6 °C), resp. rate 22, height 6' 2\" (1.88 m), weight 81.3 kg (179 lb 3.7 oz), SpO2 97 %. Intake and Output:   190 -  0700  In: 1950 [P.O.:1200; I.V.:750]  Out: 1345 [Urine:1345]  701 -  1900  In: 480 [P.O.:480]  Out: -   THERESE Output lats 24 hrs: No data found. THERESE Output last 8 hrs: No data found. BM over last 24 hrs: No data found.       Labs:  CBC:   Lab Results   Component Value Date/Time    WBC 5.8 2020 04:53 AM    HCT 21.8 (L) 2020 04:53 AM    PLATELET 775  04:53 AM     BMP:   Lab Results   Component Value Date/Time    Glucose 104 (H) 2020 03:44 PM    Sodium 136 2020 03:44 PM    Potassium 4.1 2020 03:44 PM    Chloride 105 2020 03:44 PM    CO2 25 2020 03:44 PM    BUN 25 (H) 2020 03:44 PM    Creatinine 1.79 (H) 2020 03:44 PM    Calcium 8.4 (L) 2020 03:44 PM       Assessment/Plan:     Urinary Retention  Hx of BPH  Anemia  Microscopic hematuria  - transfuse per protocol  - bladder scan BID, if >500 recommend straight cath  - strict I&Os  - continue flomax and finasteride  - rack urine to reassess in AM  - in the absence of gross hematuria with clots, would consider other sources of anemia, as well bladder. Sending urine for cytology.  Recommend outpatient cystoscopy    Case discussed with Dr. Bhatti Benton    Signed By: Cinthia Aase, NP - July 21, 2020

## 2020-07-21 NOTE — PROGRESS NOTES
1930: Bedside shift change report given to Sneha Severino (oncoming nurse) by Ashtyn Edwards (offgoing nurse). Report included the following information SBAR, Intake/Output, MAR, Accordion, Recent Results, Med Rec Status and Cardiac Rhythm NSR w/ occassional pacing. 0630: Pt declines CHG bath at this time, states he would prefer to bathe later this morning    0755: Bedside shift change report given to [address] [place] [address] (oncoming nurse) by Sneha Severino (offgoing nurse). Report included the following information SBAR, Intake/Output, MAR, Accordion, Recent Results, Med Rec Status and Cardiac Rhythm NSR/ST with some paced beats.

## 2020-07-22 NOTE — PROGRESS NOTES
Problem: Falls - Risk of  Goal: *Absence of Falls  Description: Document Rebbecca Persons Fall Risk and appropriate interventions in the flowsheet. Outcome: Progressing Towards Goal  Note: Fall Risk Interventions:  Mobility Interventions: Patient to call before getting OOB, Utilize gait belt for transfers/ambulation, Utilize walker, cane, or other assistive device         Medication Interventions: Teach patient to arise slowly, Utilize gait belt for transfers/ambulation, Patient to call before getting OOB    Elimination Interventions: Call light in reach, Patient to call for help with toileting needs, Toileting schedule/hourly rounds              Problem: LVAD: Discharge Outcomes  Goal: *Hemodynamically stable  Outcome: Progressing Towards Goal     Problem: Pressure Injury - Risk of  Goal: *Prevention of pressure injury  Description: Document Mich Scale and appropriate interventions in the flowsheet. Outcome: Progressing Towards Goal  Note: Pressure Injury Interventions:             Activity Interventions: Increase time out of bed, Pressure redistribution bed/mattress(bed type), PT/OT evaluation    Mobility Interventions: HOB 30 degrees or less, Pressure redistribution bed/mattress (bed type)    Nutrition Interventions: Document food/fluid/supplement intake, Discuss nutritional consult with provider, Offer support with meals,snacks and hydration

## 2020-07-22 NOTE — PROGRESS NOTES
[facility]ospitalist Group                                                                                          Hospitalist Progress Note  Jennifer Smith NP  Answering service: [phone] OR [phone] from in house phone        Date of Service:  2020  NAME:  Dmitri aHrgrove  :  1950  MRN:  [id number]      Admission Summary:   CC: confusion      71year old male with past medical history heart failure, LVEF 15%, on LVAD, paroxysmal atrial fibrillation, BPH, recent admission for hematuria, presenting to [facility] with progressive and worsening confusion.  Patient seen and examined and currently tangential in thought process.  Discussed with wife Julia Wolfe, via phone.  Per wife, patient has been progressively confused during the past week.  Reports he has not slept for approximately 3 nights.  At times, patient is coherent but then intermittently confused. Shakir Stein has been doing things out of the ordinary such as calling his children early in the morning or late at night.  Also calling his siblings multiple times.  Per wife, patient is Hoah[employer]ism but usually does not speak about God to others. Rama Wolfgang my encounter, patient continuously spoke about the Mika Hiss in his past.  Patient alert to name, place, situation but not year.  Denies chest pain, shortness of breath, cough, fevers, chills, sweats, urinary frequency, dysuria, abdominal pain.  Endorses hematuria     In the ED, labs remarkable for UA moderate leukocyte esterase, negative nitrites, 1+ bacteria, >100 RBCs, Hgb 7.9, Cr 2.09. Patient given zosyn.     Interval history / Subjective:     Seen and examined patient for follow up of confusion. States that he is feeling fine but feels like he is still have intermittent periods of confusion and weakness. But feels better than what he did yesterday. LVAD noted. Drive line through left lower abdomen - dressing is clean, dry, and intact. No signs of distress noted.  No over night events noted. 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 -  Advanced heart failure cardiology team on board  - ECHO done EF <15%     Acute renal failure  -  creatinine 1.88 on admission   -  nephrology following     Paroxysmal atrial fibrillation   - on digoxin  - Monitor     History of hematuria and anemia  - Coumadin discontinued on previous hospitalization due to same      Anemia of chronic disease and Iron deficiency   - On 07/21- hgb 6.7, one unit of pRBCs transfused   - Monitor labs   - Will transfuse for hgb <7.0   - Received iron infusion   - Nephrology following       Hyponatremia  - Improved   - Na 137 today   - Monitor labs      Seizure Disorder   - Continue Keppra     BPH  - Continue Finasteride and Tamsulosin       Depression  - Continue Effexor- XR         Code status: DNR   DVT prophylaxis: SCDs    Care Plan discussed with: Patient/Family and Nurse  Anticipated Disposition: Home w/Family  Anticipated Discharge: 24 hours to 48 hours     Hospital Problems  Date Reviewed: 3/23/2020          Codes Class Noted POA    Acute encephalopathy ICD-10-CM: G93.40  ICD-9-CM: 348.30  7/17/2020 Unknown                Review of Systems:   A comprehensive review of systems was negative except for that written in the HPI. Vital Signs:    Last 24hrs VS reviewed since prior progress note.  Most recent are:  Visit Vitals  /80   Pulse 92   Temp 98.2 °F (36.8 °C)   Resp 20   Ht 6' 2\" (1.88 m)   Wt 81.3 kg (179 lb 3.7 oz)   SpO2 95%   BMI 23.01 kg/m²         Intake/Output Summary (Last 24 hours) at 7/22/2020 1759  Last data filed at 7/22/2020 1518  Gross per 24 hour   Intake 720 ml   Output 1200 ml   Net -480 ml        Physical Examination:             Constitutional:  No acute distress, cooperative, pleasant, answers questions appropriately, appears stated age    ENT:  Oral mucosa moist, oropharynx benign. Resp:  CTA bilaterally. No wheezing/rhonchi/rales. No accessory muscle use   CV:  Regular irregular rhythm, normal rate, LVAD in place - Drive line noted to left lower abdomen     GI:  Soft, non distended, non tender. , unable to auscultate bowel sounds due to LVAD. Musculoskeletal:  No edema, warm, 2+ pulses throughout    Neurologic:  Moves all extremities with generalized weakness. AAOx2-3, CN II-XII reviewed     Psych:  Depressive mood   Skin:  Good turgor, no rashes or ulcers       Data Review:    Review and/or order of clinical lab test  Review and/or order of tests in the radiology section of CPT  Review and/or order of tests in the medicine section of CPT      Labs:     Recent Labs     07/22/20  0535 07/21/20  1057 07/21/20  0453   WBC 6.2  --  5.8   HGB 7.2* 7.2* 6.7*   HCT 24.0* 23.6* 21.8*     --  178     Recent Labs     07/22/20  0535 07/21/20  0453 07/20/20  1544 07/20/20  0113    136 136 136   K 4.2 4.4 4.1 4.2    107 105 107   CO2 22 21 25 25   BUN 24* 21* 25* 26*   CREA 1.70* 1.61* 1.79* 1.91*   * 95 104* 108*   CA 8.7 8.5 8.4* 8.0*   MG 2.4 2.5* 2.6*  --    PHOS 3.2  --   --  3.5     Recent Labs     07/22/20  0535 07/21/20  0453 07/20/20  1544   ALT 18 10*  9*  10* 10*   AP 94 88  89  91 101   TBILI 0.9 0.7  0.7  0.7 0.6   TP 7.6 6.8  6.7  7.2 7.4   ALB 3.4* 3.2*  3.2*  3.1* 2.9*   GLOB 4.2* 3.6  3.5  4.1* 4.5*     No results for input(s): INR, PTP, APTT, INREXT in the last 72 hours.    Recent Labs     07/21/20  0453   TIBC 146*   PSAT 58*   FERR 511*      Lab Results   Component Value Date/Time    Folate 16.5 01/16/2020 04:57 AM      No results for input(s): PH, PCO2, PO2 in the last 72 hours. No results for input(s): CPK, CKNDX, TROIQ in the last 72 hours.     No lab exists for component: CPKMB  Lab Results   Component Value Date/Time    Cholesterol, total 90 10/26/2019 04:09 AM    HDL Cholesterol 21 10/26/2019 04:09 AM    LDL, calculated 56.2 10/26/2019 04:09 AM    Triglyceride 64 10/26/2019 04:09 AM    CHOL/HDL Ratio 4.3 10/26/2019 04:09 AM     Lab Results   Component Value Date/Time    Glucose (POC) 119 (H) 07/18/2020 09:42 PM    Glucose (POC) 124 (H) 07/18/2020 04:45 PM    Glucose (POC) 116 (H) 07/18/2020 11:33 AM    Glucose (POC) 109 (H) 07/18/2020 06:13 AM    Glucose (POC) 99 01/27/2020 11:11 AM     Lab Results   Component Value Date/Time    Color YELLOW/STRAW 07/21/2020 12:43 PM    Appearance CLOUDY (A) 07/21/2020 12:43 PM    Specific gravity 1.020 07/21/2020 12:43 PM    Specific gravity 1.015 10/31/2019 11:00 AM    pH (UA) 6.0 07/21/2020 12:43 PM    Protein 300 (A) 07/21/2020 12:43 PM    Glucose Negative 07/21/2020 12:43 PM    Ketone Negative 07/21/2020 12:43 PM    Bilirubin Negative 07/21/2020 12:43 PM    Urobilinogen 0.2 07/21/2020 12:43 PM    Nitrites Negative 07/21/2020 12:43 PM    Leukocyte Esterase MODERATE (A) 07/21/2020 12:43 PM    Epithelial cells MODERATE (A) 07/21/2020 12:43 PM    Bacteria Negative 07/21/2020 12:43 PM    WBC 10-20 07/21/2020 12:43 PM    RBC >100 (H) 07/21/2020 12:43 PM         Medications Reviewed:     Current Facility-Administered Medications   Medication Dose Route Frequency    magnesium oxide (MAG-OX) tablet 400 mg  400 mg Oral BID    epoetin yvonne-epbx (RETACRIT) injection 10,000 Units  10,000 Units SubCUTAneous Q MON, WED & FRI    0.9% sodium chloride infusion 250 mL  250 mL IntraVENous PRN    calcium polycarbophiL (FIBERCON) tablet 625 mg  1 Tab Oral DAILY    hydrALAZINE (APRESOLINE) 20 mg/mL injection 5 mg  5 mg IntraVENous Q4H PRN    sodium chloride (NS) flush 5-40 mL  5-40 mL IntraVENous Q8H    sodium chloride (NS) flush 5-40 mL  5-40 mL IntraVENous PRN    acetaminophen (TYLENOL) tablet 650 mg  650 mg Oral Q4H PRN    diphenhydrAMINE (BENADRYL) injection 12.5 mg  12.5 mg IntraVENous Q4H PRN    ondansetron (ZOFRAN) injection 4 mg  4 mg IntraVENous Q4H PRN    bisacodyL (DULCOLAX) tablet 5 mg  5 mg Oral DAILY PRN    digoxin (LANOXIN) tablet 0.125 mg  0.125 mg Oral EVERY OTHER DAY    finasteride (PROSCAR) tablet 5 mg  5 mg Oral DAILY    lactobac ac& pc-s.therm-b.anim (TIAN Q/RISAQUAD)  1 Cap Oral DAILY    levETIRAcetam (KEPPRA) tablet 250 mg  250 mg Oral BID    tamsulosin (FLOMAX) capsule 0.8 mg  0.8 mg Oral QHS    venlafaxine-SR (EFFEXOR-XR) capsule 150 mg  150 mg Oral DAILY WITH BREAKFAST    piperacillin-tazobactam (ZOSYN) 3.375 g in 0.9% sodium chloride (MBP/ADV) 100 mL  3.375 g IntraVENous Q8H     ______________________________________________________________________  EXPECTED LENGTH OF STAY: 4d 16h  ACTUAL LENGTH OF STAY:          2                 Christin Solano NP

## 2020-07-22 NOTE — PROGRESS NOTES
[facility] in [place], [place]  Inpatient Progress Note      Patient name: Nilam Amaral  Patient : 1950  Patient MRN: [id number]  Attending MD: Radha Sen*  Date of service: 20    REASON FOR CONSULT:  UTI in patient with LVAD       24hr Events:  No acute overnight events  NTproBNP 73898 up from 93816      PLAN:  Increased LVAD device high speed to 6000rpm and adjust low speed to 5600rpm  Cannot tolerate beta-blockers d/t severe RV failure  Cannot tolerate ACE/ARB/ARNi due to persistent orthostasis despite increased fluid intake   Cannot tolerate spironolactone due to IVVD and hyponatremia  Resume digoxin 0.125 every other day, level 0.7; trend levels daily  Monitor CardioMEMs readings, 15mmHg (20) please check daily   Bumex 1mg IV x 1 dose  Albumin IV 12.5mg daily  LVEDD > 7cm; consider RHC to determine device speed  Unable to tolerate anticoagulation due to hematuria; awaiting LDH off aspirin  S/p Venofer infusion 200mg IV x once (20),   check hemoccult   UA (20) large blood, RBC >100, mod leukocytes  Nephrology consult appreciated  bladder and renal US (20) No evidence for hydronephrosis or mass lesion, small left kidney  Rack urine for 24 hrs; erica - tea color   Urology consult appreciated; recommending cystoscopy outpatient sending urine for cytology  Continue zosyn IV, ID consult appreciated  Recheck Blood cultures today  Blood cultures (20) preliminary Pseudomonas 2/4 bottles/ preliminary report GNRs 2/4bottles   Wound culture (20) preliminary light prob staph aureus   Will eventually need PICC placed for long term IV ABX, waiting for negative blood cultures  Gentamicin to exit site with daily dressing changes  Continue soren Q po BID   Fiber con 625 po daily   Nutrition consult, appreciate recs  Continue keppra,  Level 15.5 (20)  Hospitalist assistance appreciated  DNR  Daily weights, Low Na+ diet, strict I/O  Trend CBC, CMP, NTproBNP, Mag, LD, procalcitonin, digoxin, - check sed rate  Remain in CVSU     IMPRESSION:  UTI  Metabolic encephalopathy  Chronic systolic heart failure  Stage D, NYHA class II symptoms  Coronary artery disease  Ischemic cardiomyopathy, LVEF 15%  S/p HM3 LVAD as destination therapy (11/18/19 by Dr. Sam Srinivasan)  S/p Impella 5.0 as bridge to LVAD  HTN, goal MAP 70-90mmHg  H/o VT s/p [facility] BIV-ICD  PAF  H/o recurrent UTI, pseudomonal infection  Unable to tolerate AC due to hematuria  MSSA drive line infection  COPD  JOSE  Essential tremor on keppra  Depression on effexor  Persistently elevated CEA PET scan increased RML activity, not malignant per hemonc  Orthostatic hypotension  DNR  Up-to-date with PNA vaccine - per PCP/wife  2/4bottles blood cultures +pseudomonas (7/19/20)   diarrhea      Patient seen and examined. Data and note reviewed. I have discussed and agree with the plans as noted. 71year old male with a history of ICM s/p LVAD as DT who was admitted with pseudomonas bacteremia. His blood cultures from 7/19 remain positive. Will need lifelong IV antibiotics. Thank you for allowing us to participate in your patient's care. Mounika Soriano MD, [facility], [employer]  Chief of Cardiology, [address] [place] [address], [address] [place] [address] Nw  Office [phone]  Fax [phone]        CARDIAC IMAGING:  TTE 4/20 shows large LVIDd, 6.84 cm, RVIDd 3.06 cm, TAPSE 1.15 cm, severely elevated CVP  TTE 7/6/20- Mild AI, LViDd 6.91cm, RVIDd 5.16cm, TAPSE 1.39cm  Echo (7/18/20)  · Contrast used: DEFINITY. · Image quality for this study was technically difficult. · Severely dilated left ventricle. Wall thickness appears thin. Severe systolic function. Estimated left ventricular ejection fraction is <15%. Visually measured ejection fraction. · Moderately dilated left atrium.   · Moderately dilated right ventricle. Moderately to severely reduced systolic function. · Dilated right atrium. · Moderate mitral valve prolapse.       LVAD INTERROGATION:  Device interrogated in person, Increased Speed to 6000rpm, low speed to 5600rpm  Device function normal, normal flow, no events  LVAD   Pump Speed (RPM): 6000  Pump Flow (LPM): 4.9  MAP: 88  PI (Pulsitility Index): 3.5  Power: 4.9   Test: Yes  Back Up  at Bedside & Labeled: Yes  Power Module Test: Yes  Driveline Site Care: No  Driveline Dressing: Clean, Dry, and Intact  Outpatient: No  MAP in Therapeutic Range (Outpatient): Yes  Testing  Alarms Reviewed: Yes  Back up SC speed: 6000  Back up Low Speed Limit: 5600  Emergency Equipment with Patient?: Yes  Emergency procedures reviewed?: Yes  Drive line site inspected?: Yes  Drive line intergrity inspected?: Yes  Drive line dressing changed?: No    PHYSICAL EXAM:  Visit Vitals  /80   Pulse 94   Temp 98.4 °F (36.9 °C)   Resp 18   Ht 6' 2\" (1.88 m)   Wt 179 lb 3.7 oz (81.3 kg)   SpO2 90%   BMI 23.01 kg/m²     Review of Systems   Constitutional: Positive for malaise/fatigue. Negative for chills, fever and weight loss. HENT: Positive for hearing loss. Eyes: Negative. Respiratory: Positive for shortness of breath. Cardiovascular: Positive for leg swelling. Negative for chest pain, palpitations and orthopnea. Gastrointestinal: Negative. Genitourinary: Positive for hematuria and urgency. Negative for dysuria and flank pain. Musculoskeletal: Negative. Skin: Negative. Neurological: Positive for weakness. Endo/Heme/Allergies: Bruises/bleeds easily. Psychiatric/Behavioral: Positive for depression and memory loss. Physical Exam  Vitals signs and nursing note reviewed. Constitutional:       General: He is not in acute distress. Appearance: Normal appearance. HENT:      Head: Normocephalic. Mouth/Throat:      Mouth: Mucous membranes are dry. Neck:      Vascular: No JVD. Cardiovascular:      Rate and Rhythm: Normal rate and regular rhythm. Heart sounds: Murmur present. Comments: +lvad hum  Pulmonary:      Effort: Pulmonary effort is normal.      Breath sounds: Normal breath sounds. Abdominal:      General: Bowel sounds are normal.      Palpations: Abdomen is soft. Musculoskeletal:      Right lower leg: Edema present. Left lower leg: Edema present. Skin:     General: Skin is warm and dry. Capillary Refill: Capillary refill takes 2 to 3 seconds. Comments: Scattered bruising on extremities   Neurological:      Mental Status: He is alert and oriented to person, place, and time. Motor: Weakness present. Psychiatric:         Mood and Affect: Mood normal.         Speech: Speech normal.         Behavior: Behavior is cooperative. Thought Content:  Thought content normal.             PAST MEDICAL HISTORY:  Past Medical History:   Diagnosis Date    BPH (benign prostatic hyperplasia)     [facility]     Degenerative disc disease, lumbar     Heart failure [facility]     High cholesterol     Hypertension     Ischemic cardiomyopathy     LVAD (left ventricular assist device) present ([facility])     Paroxysmal atrial fibrillation ([facility] [address] 4/2/2019    Spinal stenosis        PAST SURGICAL HISTORY:  Past Surgical History:   Procedure Laterality Date    COLONOSCOPY Left 11/11/2019    COLONOSCOPY at bedside performed by [facility]adip [facility]ado MD at [facility] ENDOSCOPY    HX APPENDECTOMY      [address]      triple    HX HEART ASSIST DEVICE Left 10/29/2019    Impella 5.0    HX HEART ASSIST DEVICE Left 11/18/2019    HeartMate 3    HX HERNIA REPAIR      HX IMPLANTABLE CARDIOVERTER DEFIBRILLATOR      HX THROMBECTOMY Left 11/25/2019    Left brachial thrombectomy    [place] CARDIOVERSION ELECTIVE ARRHYTHMIA EXTERNAL N/A 6/10/2019    EP CARDIOVERSION performed by Felicia Bell MD at [address]  CATH LAB    [place] CARDIOVERSION ELECTIVE ARRHYTHMIA EXTERNAL N/A 2019    EP CARDIOVERSION performed by Joshua Carpio MD at [address], [facility] Dr CATH LAB    [place] INSJ/RPLCMT PERM DFB W/TRNSVNS LDS 1/DUAL CHMBR N/A 2019    INSERT ICD BIV MULTI performed by Jean Catalan MD at [address], [facility]  CATH LAB    [place] TCAT IMPL WRLS P-ART PRS SNR L-T HEMODYN MNTR N/A 2019    IMPLANT HEART FAILURE MONITORING DEVICE performed by Nataliya Zapien MD at [address], [facility]  CATH LAB       FAMILY HISTORY:  Family History   Problem Relation Age of Onset    Lung Disease Mother     Hypertension Mother     Arthritis-osteo Mother     Heart Disease Mother     Heart Disease Father     Diabetes Father        SOCIAL HISTORY:  Social History     Socioeconomic History    Marital status:      Spouse name: Not on file    Number of children: Not on file    Years of education: Not on file    Highest education level: Not on file   Tobacco Use    Smoking status: Former Smoker     Last attempt to quit: 2010     Years since quittin.6    Smokeless tobacco: Never Used   Substance and Sexual Activity    Alcohol use: Not Currently     Comment: rarely    Drug use: Never   Other Topics Concern       LABORATORY RESULTS:     Labs Latest Ref Rng & Units 2020   WBC 4.1 - 11.1 K/uL 6.2 - - - 5.8 - -   RBC 4.10 - 5.70 M/uL 2.46(L) - - - 2.23(L) - -   Hemoglobin 12.1 - 17.0 g/dL 7. 2(L) 7. 2(L) - - 6. 7(L) - -   Hematocrit 36.6 - 50.3 % 24. 0(L) 23. 6(L) - - 21. 8(L) - -   MCV 80.0 - 99.0 FL 97.6 - - - 97.8 - -   Platelets 438 - 505 K/uL 169 - - - 178 - -   Lymphocytes 12 - 49 % - - - - - - -   Monocytes 5 - 13 % - - - - - - -   Eosinophils 0 - 7 % - - - - - - -   Basophils 0 - 1 % - - - - - - -   Albumin 3.5 - 5.0 g/dL 3.4(L) - 3. 2(L) 3. 2(L) 3. 1(L) 2. 9(L) 2. 9(L)   Calcium 8.5 - 10.1 MG/DL 8.7 - 8.5 - - 8. 4(L) 8.0(L)   Glucose 65 - 100 mg/dL 121(H) - 95 - - 104(H) 108(H)   BUN 6 - 20 MG/DL 24(H) - 21(H) - - 25(H) 26(H)   Creatinine 0.70 - 1.30 MG/DL 1.70(H) - 1.61(H) - - 1.79(H) 1.91(H)   Sodium 136 - 145 mmol/L 137 - 136 - - 136 136   Potassium 3.5 - 5.1 mmol/L 4.2 - 4.4 - - 4.1 4.2   TSH 0.36 - 3.74 uIU/mL - - - - - - -   PSA 0.01 - 4.0 ng/mL - - - - - - -   LDH 85 - 241 U/L 212 - - - 193 - -   Some recent data might be hidden     Lab Results   Component Value Date/Time    TSH 1.13 07/18/2020 11:28 AM    TSH 1.70 04/21/2020 01:59 PM    TSH 2.30 12/03/2019 03:29 AM    TSH 2.40 10/25/2019 07:39 PM    TSH 2.45 06/01/2019 04:16 AM       ALLERGY:  Allergies   Allergen Reactions    Cefepime Other (comments)     myoclonus        CURRENT MEDICATIONS:    Current Facility-Administered Medications:     magnesium oxide (MAG-OX) tablet 400 mg, 400 mg, Oral, BID, Josep ACUNA NP, 400 mg at 07/22/20 0831    epoetin yvonne-epbx (RETACRIT) injection 10,000 Units, 10,000 Units, SubCUTAneous, Q MON, WED & FRI, HartJosue MD    0.9% sodium chloride infusion 250 mL, 250 mL, IntraVENous, PRN, Shana Sims, NP    calcium polycarbophiL (FIBERCON) tablet 625 mg, 1 Tab, Oral, DAILY, Josep Khan NP, 625 mg at 07/22/20 0831    hydrALAZINE (APRESOLINE) 20 mg/mL injection 5 mg, 5 mg, IntraVENous, Q4H PRN, Bernardo Miguel MD, 5 mg at 07/21/20 0509    sodium chloride (NS) flush 5-40 mL, 5-40 mL, IntraVENous, Q8H, Hyun Zhao DO, 10 mL at 07/22/20 0425    sodium chloride (NS) flush 5-40 mL, 5-40 mL, IntraVENous, PRN, Susie Gold, [employer] M, DO    acetaminophen (TYLENOL) tablet 650 mg, 650 mg, Oral, Q4H PRN, Susie Asif, Hyun M, DO    diphenhydrAMINE (BENADRYL) injection 12.5 mg, 12.5 mg, IntraVENous, Q4H PRN, Susie Asif, Hyun M, DO    ondansetron [facility]) injection 4 mg, 4 mg, IntraVENous, Q4H PRN, Susie Gold Hyun M, DO, 4 mg at 07/22/20 1212    bisacodyL (DULCOLAX) tablet 5 mg, 5 mg, Oral, DAILY PRN, Richy Merida M, DO    digoxin (LANOXIN) tablet 0.125 mg, 0.125 mg, Oral, EVERY OTHER DAY, David Pitt M, DO, 0.125 mg at 07/22/20 1040    finasteride (PROSCAR) tablet 5 mg, 5 mg, Oral, DAILY, CARLOS Johnson M, DO, 5 mg at 07/22/20 1040    lactobac ac& pc-s.therm-b.anim (TIAN Q/RISAQUAD), 1 Cap, Oral, DAILY, Madhuri Zhao DO, 1 Cap at 07/22/20 0831    levETIRAcetam (KEPPRA) tablet 250 mg, 250 mg, Oral, BID, David Pitt M, DO, 250 mg at 07/22/20 0831    tamsulosin (FLOMAX) capsule 0.8 mg, 0.8 mg, Oral, QHS, Hyun Zhao DO, 0.8 mg at 07/21/20 2145    venlafaxine-SR (EFFEXOR-XR) capsule 150 mg, 150 mg, Oral, DAILY WITH BREAKFAST, Hyun Zhao DO, 150 mg at 07/22/20 0831    piperacillin-tazobactam (ZOSYN) 3.375 g in 0.9% sodium chloride (MBP/ADV) 100 mL, 3.375 g, IntraVENous, Q8H, Hyun Zhao DO, Last Rate: 25 mL/hr at 07/22/20 1148, 3.375 g at 07/22/20 1148    Thank you for allowing me to participate in this patient's care.     Pricila Lombardo NP  [address] [place] [address], [address]  Phone: [phone]  Fax: [phone]

## 2020-07-22 NOTE — PROGRESS NOTES
Patient: Jesus Schaefer MRN: [id number]  SSN: [id number]    YOB: 1950  Age: 71 y.o. Sex: male        ADMITTED: 2020 to [facility] by Shanta Winn MD for Acute encephalopathy [G93.40]  Acute encephalopathy [G93.40]  POD# * No surgery found *     Patient reported SOB. Currently getting Bumex for diuresis. Condom cath placed yesterday for urinary frequency. UA is erica to brown in color. No active bleeding noted. afvss  WBC: wnl  : Bcx: PSEUDOMONAS   : Bcx: pending  Hgb: 7.2  Cr: 1.70  UA: 10-20 WBCs, >100 RBCs, 1+ bacteria  Ucx: no growth  Urine cytology: pending  +zosyn  +0.8mg flomax, finasteride  +venofer    Vitals: Temp (24hrs), Av.2 °F (36.8 °C), Min:97.9 °F (36.6 °C), Max:98.4 °F (36.9 °C)    Blood pressure 115/80, pulse 94, temperature 98.4 °F (36.9 °C), resp. rate 18, height 6' 2\" (1.88 m), weight 81.3 kg (179 lb 3.7 oz), SpO2 90 %. Intake and Output:  1901 -  0700  In: 1870 [P.O.:1220; I.V.:650]  Out: 1440 [Urine:1440]   0701 -  1900  In: 100 [I.V.:100]  Out: 50 [Urine:50]  THERESE Output lats 24 hrs: No data found. THERESE Output last 8 hrs: No data found.   BM over last 24 hrs:   Patient Vitals for the past 24 hrs:   Stool Occurrence(s)   20 0721 1       Labs:  CBC:   Lab Results   Component Value Date/Time    WBC 6.2 2020 05:35 AM    HCT 24.0 (L) 2020 05:35 AM    PLATELET 889  05:35 AM     BMP:   Lab Results   Component Value Date/Time    Glucose 121 (H) 2020 05:35 AM    Sodium 137 2020 05:35 AM    Potassium 4.2 2020 05:35 AM    Chloride 107 2020 05:35 AM    CO2 22 2020 05:35 AM    BUN 24 (H) 2020 05:35 AM    Creatinine 1.70 (H) 2020 05:35 AM    Calcium 8.7 2020 05:35 AM       Assessment/Plan:     Urinary Retention  Hx of BPH  Anemia  Microscopic hematuria  - transfuse per protocol  - bladder scan BID, if >500 recommend straight cath  - strict I&Os  - continue flomax and finasteride  - in the absence of gross hematuria with clots, would consider other sources of anemia, as well bladder.  - Recommend outpatient cystoscopy  - urine cytology pending    Supervising MD, Dr. Joyce Hernandez      Signed By: Jennifer Castillo NP - July 22, 2020

## 2020-07-22 NOTE — PROGRESS NOTES
[facility] VAD/Heart Failure Progress Note    Admit Date: 2020  POD:  * No surgery found *    Procedure:          Subjective:   Feels better; mild fatigue and weakness; confusion better     Objective:   Vitals:  Blood pressure 115/80, pulse 92, temperature 98.2 °F (36.8 °C), resp. rate 20, height 6' 2\" (1.88 m), weight 179 lb 3.7 oz (81.3 kg), SpO2 95 %. Temp (24hrs), Av.3 °F (36.8 °C), Min:98 °F (36.7 °C), Max:98.4 °F (36.9 °C)    Hemodynamics:   CO:     CI:     CVP:     SVR:     PAP Systolic:     PAP Diastolic:     PVR:     AR57:     SCV02:      VAD Interrogation: LVAD (Heartmate)  Pump Speed (RPM): 6000  Pump Flow (LPM): 4.8  Chatter in Lines: No  PI (Pulsitility Index): 3.5  Power: 4.8  MAP: 92   Test: Yes  Back Up  at Bedside & Labeled: Yes  Power Module Test: Yes  Driveline Site Care: No  Driveline Dressing: Clean, Dry, and Intact    EKG/Rhythm:      Extubation Date / Time:     CT Output:     Ventilator:       Oxygen Therapy:  Oxygen Therapy  O2 Sat (%): 95 % (20)  Pulse via Oximetry: 117 beats per minute (20 2300)  O2 Device: Nasal cannula (20)  O2 Flow Rate (L/min): 2 l/min (20)    CXR:    Admission Weight: Last Weight   Weight: 175 lb 0.7 oz (79.4 kg) Weight: 179 lb 3.7 oz (81.3 kg)     Intake / Output / Drain:  Current Shift:  0701 -  1900  In: 320 [P.O.:120; I.V.:200]  Out: 650 [Urine:650]  Last 24 hrs.:     Intake/Output Summary (Last 24 hours) at 2020 1743  Last data filed at 2020 1518  Gross per 24 hour   Intake 720 ml   Output 1200 ml   Net -480 ml       EXAM:  General:                                                                                                                          No results for input(s): CPK, CKMB, TROIQ in the last 72 hours.   Recent Labs     20  0535 20  1057 20  0453 20  1544 20  0113     --  136 136 136   K 4.2  --  4.4 4.1 4.2   CO2 22  --  21 25 25   BUN 24*  --  21* 25* 26*   CREA 1.70*  --  1.61* 1.79* 1.91*   *  --  95 104* 108*   PHOS 3.2  --   --   --  3.5   MG 2.4  --  2.5* 2.6*  --    WBC 6.2  --  5.8  --   --    HGB 7.2* 7.2* 6.7*  --   --    HCT 24.0* 23.6* 21.8*  --   --      --  178  --   --      No results for input(s): INR, PTP, APTT, INREXT in the last 72 hours.   No lab exists for component: PBNP        Current Facility-Administered Medications:     magnesium oxide (MAG-OX) tablet 400 mg, 400 mg, Oral, BID, Laxmi Ritter NP, 400 mg at 07/22/20 1718    epoetin yvonne-epbx (RETACRIT) injection 10,000 Units, 10,000 Units, SubCUTAneous, Q MON, WED & FRI, HartLis MD    0.9% sodium chloride infusion 250 mL, 250 mL, IntraVENous, PRN, Shana Sims, NP    calcium polycarbophiL (FIBERCON) tablet 625 mg, 1 Tab, Oral, DAILY, Laxmi Ritter NP, 625 mg at 07/22/20 0831    hydrALAZINE (APRESOLINE) 20 mg/mL injection 5 mg, 5 mg, IntraVENous, Q4H PRN, Aleyda Florence MD, 5 mg at 07/21/20 0509    sodium chloride (NS) flush 5-40 mL, 5-40 mL, IntraVENous, Q8H, Hyun Zhao DO, 10 mL at 07/22/20 1520    sodium chloride (NS) flush 5-40 mL, 5-40 mL, IntraVENous, PRN, Dory Izquierdo DO    acetaminophen (TYLENOL) tablet 650 mg, 650 mg, Oral, Q4H PRN, Hyun Izquierdo DO    diphenhydrAMINE (BENADRYL) injection 12.5 mg, 12.5 mg, IntraVENous, Q4H PRN, Dory Izquierdo DO    ondansetron [facility]) injection 4 mg, 4 mg, IntraVENous, Q4H PRN, Parris FRENCH DO, 4 mg at 07/22/20 6621    bisacodyL (DULCOLAX) tablet 5 mg, 5 mg, Oral, DAILY PRN, Parris FRENCH DO    digoxin (LANOXIN) tablet 0.125 mg, 0.125 mg, Oral, EVERY OTHER DAY, Hyun Zhao DO, 0.125 mg at 07/22/20 1040    finasteride (PROSCAR) tablet 5 mg, 5 mg, Oral, DAILY, Hyun Zhao DO, 5 mg at 07/22/20 1040    lactobac ac& pc-s.zafar-b.anim (TIAN Q/RISAQUAD), 1 Cap, Oral, DAILY, Madhuri Zhao, , 1 Cap at 07/22/20 0831    levETIRAcetam (KEPPRA) tablet 250 mg, 250 mg, Oral, BID, Joseph FRENCH, DO, 250 mg at 07/22/20 1718    tamsulosin (FLOMAX) capsule 0.8 mg, 0.8 mg, Oral, QHS, Hyun Zhao, DO, 0.8 mg at 07/21/20 2145    venlafaxine-SR (EFFEXOR-XR) capsule 150 mg, 150 mg, Oral, DAILY WITH BREAKFAST, Hyun Zhao, DO, 150 mg at 07/22/20 0831    piperacillin-tazobactam (ZOSYN) 3.375 g in 0.9% sodium chloride (MBP/ADV) 100 mL, 3.375 g, IntraVENous, Q8H, Hyun Zhao, DO, Last Rate: 25 mL/hr at 07/22/20 1148, 3.375 g at 07/22/20 1148    A/P  S/P LVAD - good flows  Need for anticoagulation - on hodl due to recurrent bleeds  UTI - abx's  Confusion - monitor     Risk of morbidity and mortality - high  Medical decision making - high complexity    Signed By: Mahendra Lizama MD

## 2020-07-22 NOTE — ANESTHESIA PREPROCEDURE EVALUATION
Pt here today for OB follow up  Pt states she is having constant stomach tightening, and irregular contractions.  Reports +FM pt states baby isn't as active as normal.  Good # [phone]   Pharmacy Confirmed.  Chaperone offered and not indicated.      Relevant Problems No relevant active problems Anesthetic History No history of anesthetic complications Review of Systems / Medical History Patient summary reviewed, nursing notes reviewed and pertinent labs reviewed Pulmonary Within defined limits Neuro/Psych Within defined limits Cardiovascular Within defined limits Dysrhythmias : atrial fibrillation CAD 
 
 
  
GI/Hepatic/Renal 
Within defined limits Endo/Other Within defined limits Other Findings Physical Exam 
 
Airway Mallampati: II 
TM Distance: > 6 cm Neck ROM: normal range of motion Mouth opening: Normal 
 
 Cardiovascular Regular rate and rhythm,  S1 and S2 normal,  no murmur, click, rub, or gallop Dental 
No notable dental hx Pulmonary Breath sounds clear to auscultation Abdominal 
GI exam deferred Other Findings Anesthetic Plan ASA: 3 Anesthesia type: MAC Anesthetic plan and risks discussed with: Patient

## 2020-07-22 NOTE — PROGRESS NOTES
0730:  Bedside shift change report given to Wilma Mtz RN (oncoming nurse) by Pat Hoang RN (offgoing nurse). Report included the following information SBAR, Kardex, Procedure Summary, Intake/Output, MAR and Recent Results. 1035:  Paired blood cultures drawn and sent to lab. Condom catheter placed per patient request.    6361:  Driveline dressing change performed using sterile technique. No drainage noted. 1930:  Bedside shift change report given to Girish Duke RN (oncoming nurse) by Wilma Mtz RN (offgoing nurse). Report included the following information SBAR, Kardex, Procedure Summary, Intake/Output, MAR and Recent Results.

## 2020-07-22 NOTE — PROGRESS NOTES
ID Progress Note  2020    Subjective: has been having one loose stool/day - on probiotic and fibercon per primary team. One episode of nausea which relieved with Zofran. Review of Systems:            Symptom Y/N Comments   Symptom Y/N Comments   Fever/Chills n      Chest Pain  n      Poor Appetite       Edema        Cough n      Abdominal Pain        Sputum       Joint Pain        SOB/DOUGLAS  n     Pruritis/Rash        Nausea/vomit n      Tolerating PT/OT        Diarrhea  y     Tolerating Diet        Constipation  n     Other           Could NOT obtain due to:       Objective:     Vitals:   Visit Vitals  /80   Pulse 94   Temp 98.4 °F (36.9 °C)   Resp 18   Ht 6' 2\" (1.88 m)   Wt 81.3 kg (179 lb 3.7 oz)   SpO2 90%   BMI 23.01 kg/m²        Tmax:  Temp (24hrs), Av.2 °F (36.8 °C), Min:97.9 °F (36.6 °C), Max:98.4 °F (36.9 °C)      PHYSICAL EXAM:  General: Ill appearing. Alert, cooperative, no acute distress    EENT:  EOMI. Anicteric sclerae. MMM  Resp:  Clear in apex with decreased breath sound. no wheezing or rales. No accessory muscle use                          (+) LVAD hum  CV:  Regular  rhythm,  No edema  GI:  Soft, Non distended, Non tender. +Bowel sounds, LVAD insertion site dressing dry and intact  Neurologic:  Alert and oriented X 3, normal speech,   Psych:   Good insight. Not anxious nor agitated  Skin:  No rashes.   No jaundice    Labs:   Lab Results   Component Value Date/Time    WBC 6.2 2020 05:35 AM    HGB 7.2 (L) 2020 05:35 AM    HCT 24.0 (L) 2020 05:35 AM    PLATELET 450  05:35 AM    MCV 97.6 2020 05:35 AM     Lab Results   Component Value Date/Time    Sodium 137 2020 05:35 AM    Potassium 4.2 2020 05:35 AM    Chloride 107 2020 05:35 AM    CO2 22 2020 05:35 AM    Anion gap 8 2020 05:35 AM    Glucose 121 (H) 2020 05:35 AM    BUN 24 (H) 2020 05:35 AM    Creatinine 1.70 (H) 2020 05:35 AM    BUN/Creatinine ratio 14 07/22/2020 05:35 AM    GFR est AA 49 (L) 07/22/2020 05:35 AM    GFR est non-AA 40 (L) 07/22/2020 05:35 AM    Calcium 8.7 07/22/2020 05:35 AM    Bilirubin, total 0.9 07/22/2020 05:35 AM    Alk. phosphatase 94 07/22/2020 05:35 AM    Protein, total 7.6 07/22/2020 05:35 AM    Albumin 3.4 (L) 07/22/2020 05:35 AM    Globulin 4.2 (H) 07/22/2020 05:35 AM    A-G Ratio 0.8 (L) 07/22/2020 05:35 AM    ALT (SGPT) 18 07/22/2020 05:35 AM       Results     Procedure Component Value Units Date/Time    CULTURE, BLOOD, PAIRED [[id number]] Collected:  07/22/20 1026    Order Status:  Completed Specimen:  Blood Updated:  07/22/20 1202    CULTURE, WOUND Conny Pedro STAIN [[id number]]  (Abnormal) Collected:  07/20/20 1420    Order Status:  Completed Specimen:  Wound Drainage Updated:  07/21/20 1338     Special Requests: NO SPECIAL REQUESTS        GRAM STAIN NO ORGANISMS SEEN        Culture result:       LIGHT PROBABLE STAPHYLOCOCCUS AUREUS          CULTURE, BLOOD, PAIRED [[id number]]  (Abnormal) Collected:  07/19/20 0438    Order Status:  Completed Specimen:  Blood Updated:  07/21/20 0817     Special Requests: NO SPECIAL REQUESTS        Culture result:       PSEUDOMONAS SPECIES ISOLATED FROM 2 OF 4 BOTTLES DRAWN, EACH FROM A DIFFERENT SITE. .. LFA AND RA. PLEASE REFER TO PREVIOUS PBC [id number], COLLECTED 7/17/20, FOR FURTHER SPECIATION AND SUSCEPTIBILITIES                  PRELIMINARY REPORT OF GRAM NEGATIVE RODS GROWING IN 2 OF 4 BOTTLES DRAWN . ..(SITES= LFA AND R ARM) CALLED TO AND READ BACK BY DIANA POWELL ([place]) ON 07/20/2020 AT 0700 BY MARIA A                  REMAINING BOTTLE(S) HAS/HAVE NO GROWTH SO FAR          CULTURE, BLOOD, PAIRED [[id number]]  (Abnormal)  (Susceptibility) Collected:  07/17/20 4857    Order Status:  Completed Specimen:  Blood Updated:  07/20/20 1016     Special Requests: NO SPECIAL REQUESTS        Culture result:       PSEUDOMONAS AERUGINOSA GROWING IN 2 OF 4 BOTTLES DRAWN . ..(SITES= RAC AND LAC) PRELIMINARY REPORT OF GRAM NEGATIVE RODS GROWING IN 2 OF 4 BOTTLES DRAWN CALLED TO AND READ BACK BY  MS Flores RN AT 1805 ON 7/18/20. PJ                  REMAINING BOTTLE(S) HAS/HAVE NO GROWTH SO FAR          Susceptibility      Pseudomonas aeruginosa     ANA (Preliminary)     Amikacin ($) Susceptible     Cefepime ($$) Susceptible     Ceftazidime ($) Susceptible     Ciprofloxacin ($) Resistant     Gentamicin ($) Susceptible     Levofloxacin ($) Resistant     Meropenem ($$) Susceptible     Piperacillin/Tazobac ($) Susceptible [1]      Tobramycin ($) Susceptible            [1]   **FDA INTERPRETATION REFLECTED, REFER TO [employer] FOR ALTERNATE INTERPRETATIONS. **                 CULTURE, URINE [[id number]]     Order Status:  Canceled Specimen:  Urine from Clean catch     URINE CULTURE HOLD SAMPLE [[id number]] Collected:  07/17/20 1151    Order Status:  Completed Specimen:  Urine from Serum Updated:  07/17/20 1204     Urine culture hold       Urine on hold in Microbiology dept for 2 days. If unpreserved urine is submitted, it cannot be used for addtional testing after 24 hours, recollection will be required.           CULTURE, URINE [[id number]] Collected:  07/17/20 1151    Order Status:  Completed Specimen:  Urine from Clean catch Updated:  07/18/20 1155     Special Requests: NO SPECIAL REQUESTS        Culture result:       No significant growth, <10,000 CFU/mL              Assessment and Plan   · LVAD drive line infection  · History of Pseudomonas bacteremia  · AMS; resolved       blood cx (7/17,19): pseudomonas, gram (-) rods       Blood cx (7/22): pending        Urine cx No significant growth, <10,000 CFU/mL          -> continue with IV Zosyn              May need lifelong IV ABX vs re-siting the LVAD    Valarie Tillman Si, NP

## 2020-07-22 NOTE — PROGRESS NOTES
[facility] VAD/Heart Failure Progress Note    Admit Date: 2020  POD:  * No surgery found *    Procedure:          Subjective:   Confusion better; mild dyspnea and fatigue; no chest pain      Objective:   Vitals:  Blood pressure 115/80, pulse 92, temperature 98.2 °F (36.8 °C), resp. rate 20, height 6' 2\" (1.88 m), weight 179 lb 3.7 oz (81.3 kg), SpO2 95 %. Temp (24hrs), Av.3 °F (36.8 °C), Min:98 °F (36.7 °C), Max:98.4 °F (36.9 °C)    Hemodynamics:   CO:     CI:     CVP:     SVR:     PAP Systolic:     PAP Diastolic:     PVR:     AE20:     SCV02:      VAD Interrogation: LVAD (Heartmate)  Pump Speed (RPM): 6000  Pump Flow (LPM): 4.8  Chatter in Lines: No  PI (Pulsitility Index): 3.5  Power: 4.8  MAP: 92   Test: Yes  Back Up  at Bedside & Labeled: Yes  Power Module Test: Yes  Driveline Site Care: No  Driveline Dressing: Clean, Dry, and Intact    EKG/Rhythm:      Extubation Date / Time:     CT Output:     Ventilator:       Oxygen Therapy:  Oxygen Therapy  O2 Sat (%): 95 % (20)  Pulse via Oximetry: 117 beats per minute (20 2300)  O2 Device: Nasal cannula (20)  O2 Flow Rate (L/min): 2 l/min (20)    CXR:    Admission Weight: Last Weight   Weight: 175 lb 0.7 oz (79.4 kg) Weight: 179 lb 3.7 oz (81.3 kg)     Intake / Output / Drain:  Current Shift:  0701 -  1900  In: 320 [P.O.:120; I.V.:200]  Out: 650 [Urine:650]  Last 24 hrs.:     Intake/Output Summary (Last 24 hours) at 2020 1742  Last data filed at 2020 1518  Gross per 24 hour   Intake 720 ml   Output 1200 ml   Net -480 ml             No results for input(s): CPK, CKMB, TROIQ in the last 72 hours.   Recent Labs     20  0535 20  1057 20  0453 20  1544 20  0113     --  136 136 136   K 4.2  --  4.4 4.1 4.2   CO2 22  --  21 25 25   BUN 24*  --  21* 25* 26*   CREA 1.70*  --  1.61* 1.79* 1.91*   *  --  95 104* 108*   PHOS 3.2  --   --   --  3.5   MG 2.4  --  2.5* 2.6*  --    WBC 6.2  --  5.8  --   --    HGB 7.2* 7.2* 6.7*  --   --    HCT 24.0* 23.6* 21.8*  --   --      --  178  --   --      No results for input(s): INR, PTP, APTT, INREXT in the last 72 hours.   No lab exists for component: PBNP        Current Facility-Administered Medications:     magnesium oxide (MAG-OX) tablet 400 mg, 400 mg, Oral, BID, Jacky Sherie, NP, 400 mg at 07/22/20 1718    epoetin yvonne-epbx (RETACRIT) injection 10,000 Units, 10,000 Units, SubCUTAneous, Q MON, WED & FRI, Tadeo Harper MD    0.9% sodium chloride infusion 250 mL, 250 mL, IntraVENous, PRN, Shana Sims, NP    calcium polycarbophiL (FIBERCON) tablet 625 mg, 1 Tab, Oral, DAILY, Jacky Rehman, NP, 625 mg at 07/22/20 0831    hydrALAZINE (APRESOLINE) 20 mg/mL injection 5 mg, 5 mg, IntraVENous, Q4H PRN, Antwan De Los Santos MD, 5 mg at 07/21/20 0509    sodium chloride (NS) flush 5-40 mL, 5-40 mL, IntraVENous, Q8H, Hyun Zhao, DO, 10 mL at 07/22/20 1520    sodium chloride (NS) flush 5-40 mL, 5-40 mL, IntraVENous, PRN, Klarissa Elizabeth, [employer] M, DO    acetaminophen (TYLENOL) tablet 650 mg, 650 mg, Oral, Q4H PRN, Hyun Santo, DO    diphenhydrAMINE (BENADRYL) injection 12.5 mg, 12.5 mg, IntraVENous, Q4H PRN, Klarissa Elizabeth, [employer] M, DO    ondansetron [facility]) injection 4 mg, 4 mg, IntraVENous, Q4H PRN, Noel FRENCH DO, 4 mg at 07/22/20 8885    bisacodyL (DULCOLAX) tablet 5 mg, 5 mg, Oral, DAILY PRN, Noel Mitchell M, DO    digoxin (LANOXIN) tablet 0.125 mg, 0.125 mg, Oral, EVERY OTHER DAY, Hyun Zhao DO, 0.125 mg at 07/22/20 1040    finasteride (PROSCAR) tablet 5 mg, 5 mg, Oral, DAILY, Hyun Zhao DO, 5 mg at 07/22/20 1040    [facility] ac& pc-s.therm-b.anim (TIAN Q/RISAQUAD), 1 Cap, Oral, DAILY, Madhuri Zhao DO, 1 Cap at 07/22/20 0831    levETIRAcetam (KEPPRA) tablet 250 mg, 250 mg, Oral, BID, Noel FRENCH DO, 250 mg at 07/22/20 1718   tamsulosin (FLOMAX) capsule 0.8 mg, 0.8 mg, Oral, QHS, Hyun Zhao M, DO, 0.8 mg at 07/21/20 2145    venlafaxine-SR (EFFEXOR-XR) capsule 150 mg, 150 mg, Oral, DAILY WITH BREAKFAST, Pavel, Hyun M, DO, 150 mg at 07/22/20 0831    piperacillin-tazobactam (ZOSYN) 3.375 g in 0.9% sodium chloride (MBP/ADV) 100 mL, 3.375 g, IntraVENous, Q8H, Hyun Zhao M, DO, Last Rate: 25 mL/hr at 07/22/20 1148, 3.375 g at 07/22/20 1148       A/P  S/P LVAD - good flows  Need for anticoagulation - on hodl due to recurrent bleeds  UTI - abx's  Confusion - monitor     Risk of morbidity and mortality - high  Medical decision making - high complexity    Signed By: Angelica Alvarado MD

## 2020-07-22 NOTE — PROGRESS NOTES
Transitions of Care Plan:   RUR: 27%   Bacteremia; long term IV abx   Baseline: rollator and WC; LVAD; open with [facility]   Disposition: home with [facility] - IV abx; LVAD    CM monitoring patient for IV antibiotics. Patient is open with [facility] for home health. Bumex for diureses. CM will continue to follow.     Joanna Dolan, MPH

## 2020-07-22 NOTE — PROGRESS NOTES
Nephrology Progress Note  Sona Kiser  Date of Admission : 7/17/2020    CC: Follow up for JUAN J on CKD       Assessment and Plan     JUAN J on CKD  - 2/2 volume depletion  - U/S neg for hydronephrosis  - Cr stable  - cont present care  - daily labs while here    HFrEF:  - EF 16-20%, NYHA Class IV , hx of VF arrest - s/p AICD  - s/p LVAD placement on 11/18     CKD Stage III   - Mild Left renal atrophy at baseline   - baseline Cr around 1.2 to 1.4     BPH w/ hx of urinary retention:  - bladder scan and SC if residual > 500  - on flomax and proscar     PAF s/p DCCV 6/19     Anemia:  - IV x 3 doses  - hgb 7.2  - start PAVEL    Pseudomonas bacteremia:  - on zosyn  - per ID       Interval History:  Seen and examined. Cr 1.7. Given lasix x 1 this AM for SOB and rise in BNP. No cp, n/v/d reported at this time. Current Medications: all current  Medications have been eviewed in EPIC  Review of Systems: Pertinent items are noted in HPI. Objective:  Vitals:    Vitals:    07/21/20 2305 07/22/20 0406 07/22/20 0721 07/22/20 1110   BP:       Pulse: (!) 101 (!) 103 (!) 104 94   Resp: 20 20 22 18   Temp: 98.4 °F (36.9 °C) 98.4 °F (36.9 °C) 98 °F (36.7 °C) 98.4 °F (36.9 °C)   SpO2: 99% 98% 100% 90%   Weight:   81.3 kg (179 lb 3.7 oz)    Height:         Intake and Output:  07/22 0701 - 07/22 1900  In: 200 [I.V.:200]  Out: 50 [Urine:50]  07/20 1901 - 07/22 0700  In: 1870 [P.O.:1220;  I.V.:650]  Out: 1440 [Urine:1440]    Physical Examination:  Pt intubated     No  General: NAD,Conversant   Neck:  Supple, no mass  Resp:  Lungs CTA B/L, no wheezing , normal respiratory effort  CV:  RRR,  + VAD sounds, no LE edema  GI:  Soft, NT, + Bowel sounds, no hepatosplenomegaly  Neurologic:  Non focal  Psych:             AAO x 3 appropriate affect   Skin:  No Rash  :  No neal    []    High complexity decision making was performed  []    Patient is at high-risk of decompensation with multiple organ involvement    Lab Data Personally Reviewed: I have reviewed all the pertinent labs, microbiology data and radiology studies during assessment. Recent Labs     07/22/20  0535 07/21/20  0453 07/20/20  1544 07/20/20  0113    136 136 136   K 4.2 4.4 4.1 4.2    107 105 107   CO2 22 21 25 25   * 95 104* 108*   BUN 24* 21* 25* 26*   CREA 1.70* 1.61* 1.79* 1.91*   CA 8.7 8.5 8.4* 8.0*   MG 2.4 2.5* 2.6*  --    PHOS 3.2  --   --  3.5   ALB 3.4* 3.2*  3.2*  3.1* 2.9* 2.9*   ALT 18 10*  9*  10* 10*  --      Recent Labs     07/22/20  0535 07/21/20  1057 07/21/20  0453   WBC 6.2  --  5.8   HGB 7.2* 7.2* 6.7*   HCT 24.0* 23.6* 21.8*     --  178     No results found for: SDES  Lab Results   Component Value Date/Time    Culture result: LIGHT PROBABLE STAPHYLOCOCCUS AUREUS (A) 07/20/2020 02:20 PM    Culture result: (A) 07/19/2020 04:38 AM     PSEUDOMONAS SPECIES ISOLATED FROM 2 OF 4 BOTTLES DRAWN, EACH FROM A DIFFERENT SITE. .. LFA AND RA. PLEASE REFER TO PREVIOUS [facility] [id number], COLLECTED 7/17/20, FOR FURTHER SPECIATION AND SUSCEPTIBILITIES    Culture result: (A) 07/19/2020 04:38 AM     PRELIMINARY REPORT OF GRAM NEGATIVE RODS GROWING IN 2 OF 4 BOTTLES DRAWN . ..(SITES= LFA AND R ARM) CALLED TO AND READ BACK BY DIANA POWELL ([place]) ON 07/20/2020 AT 0700 BY MARIA A    Culture result: REMAINING BOTTLE(S) HAS/HAVE NO GROWTH SO FAR 07/19/2020 04:38 AM     Recent Results (from the past 24 hour(s))   URINALYSIS W/MICROSCOPIC    Collection Time: 07/21/20 12:43 PM   Result Value Ref Range    Color YELLOW/STRAW      Appearance CLOUDY (A) CLEAR      Specific gravity 1.020 1.003 - 1.030      pH (UA) 6.0 5.0 - 8.0      Protein 300 (A) NEG mg/dL    Glucose Negative NEG mg/dL    Ketone Negative NEG mg/dL    Bilirubin Negative NEG      Blood LARGE (A) NEG      Urobilinogen 0.2 0.2 - 1.0 EU/dL    Nitrites Negative NEG      Leukocyte Esterase MODERATE (A) NEG      WBC 10-20 0 - 4 /hpf    RBC >100 (H) 0 - 5 /hpf    Epithelial cells MODERATE (A) FEW /lpf Bacteria Negative NEG /hpf    Mucus TRACE (A) NEG /lpf    Amorphous Crystals FEW (A) NEG      Hyaline cast 0-2 0 - 5 /lpf    Granular cast 0-2 (A) NEG /lpf    WBC cast 0-2 (A) NEG /lpf    Other: Renal Epithelial cells Present     DIGOXIN    Collection Time: 07/22/20  5:35 AM   Result Value Ref Range    Digoxin level 0.7 (L) 0.90 - 2.00 NG/ML   MAGNESIUM    Collection Time: 07/22/20  5:35 AM   Result Value Ref Range    Magnesium 2.4 1.6 - 2.4 mg/dL   METABOLIC PANEL, COMPREHENSIVE    Collection Time: 07/22/20  5:35 AM   Result Value Ref Range    Sodium 137 136 - 145 mmol/L    Potassium 4.2 3.5 - 5.1 mmol/L    Chloride 107 97 - 108 mmol/L    CO2 22 21 - 32 mmol/L    Anion gap 8 5 - 15 mmol/L    Glucose 121 (H) 65 - 100 mg/dL    BUN 24 (H) 6 - 20 MG/DL    Creatinine 1.70 (H) 0.70 - 1.30 MG/DL    BUN/Creatinine ratio 14 12 - 20      GFR est AA 49 (L) >60 ml/min/1.73m2    GFR est non-AA 40 (L) >60 ml/min/1.73m2    Calcium 8.7 8.5 - 10.1 MG/DL    Bilirubin, total 0.9 0.2 - 1.0 MG/DL    ALT (SGPT) 18 12 - 78 U/L    AST (SGOT) 16 15 - 37 U/L    Alk.  phosphatase 94 45 - 117 U/L    Protein, total 7.6 6.4 - 8.2 g/dL    Albumin 3.4 (L) 3.5 - 5.0 g/dL    Globulin 4.2 (H) 2.0 - 4.0 g/dL    A-G Ratio 0.8 (L) 1.1 - 2.2     PROCALCITONIN    Collection Time: 07/22/20  5:35 AM   Result Value Ref Range    Procalcitonin 0.35 ng/mL   SED RATE (ESR)    Collection Time: 07/22/20  5:35 AM   Result Value Ref Range    Sed rate, automated 109 (H) 0 - 20 mm/hr   CBC W/O DIFF    Collection Time: 07/22/20  5:35 AM   Result Value Ref Range    WBC 6.2 4.1 - 11.1 K/uL    RBC 2.46 (L) 4.10 - 5.70 M/uL    HGB 7.2 (L) 12.1 - 17.0 g/dL    HCT 24.0 (L) 36.6 - 50.3 %    MCV 97.6 80.0 - 99.0 FL    MCH 29.3 26.0 - 34.0 PG    MCHC 30.0 30.0 - 36.5 g/dL    RDW 15.8 (H) 11.5 - 14.5 %    PLATELET 423 268 - 060 K/uL    MPV 9.5 8.9 - 12.9 FL    NRBC 0.0 0  WBC    ABSOLUTE NRBC 0.00 0.00 - 0.01 K/uL   LD    Collection Time: 07/22/20  5:35 AM   Result Value Ref Range     85 - 241 U/L   NT-PRO BNP    Collection Time: 07/22/20  5:35 AM   Result Value Ref Range    NT pro-BNP 32,951 (H) <125 PG/ML   PHOSPHORUS    Collection Time: 07/22/20  5:35 AM   Result Value Ref Range    Phosphorus 3.2 2.6 - 4.7 MG/DL               Krista Galaviz MD  [address]  Phone - [phone]   Fax - [phone]  [email or web address]

## 2020-07-23 NOTE — PROGRESS NOTES
Patient: Jose Vergara MRN: [id number]  SSN: [id number]    YOB: 1950  Age: 71 y.o. Sex: male        ADMITTED: 7/17/2020 to Seth Moran MD by Lacy Boucher MD for Acute encephalopathy [G93.40]  Acute encephalopathy [G93.40]  POD# * No surgery found *     Urine cytology shows suspicion for high-grade urothelial carcinoma. Patient will need to be cleared by cardiology if patient can proceed with diagnostic cystoscopy and TURBT during this admission. Discussed with patient, he is okay to proceed. Cardiac team evaluating patient now. Will inform NP if cleared. Reports only one LVAD team member who would be going down to the OR. RN will inform NP of any changes. Dr. Josephine Ron will be by later to discuss    Cysto on 10/2019 showed catheter related cystitis changes  of the posterior wall of the bladder but I did not see any suggestion of tumor or anything worrisome. Theresa Massa was no active bleeding. 07/06/2020  CT abd/pelv wwo:  Kidneys/Ureters/Bladder: No renal masses. Multifocal cortical scarring  throughout the left kidney, and small area of vertical scarring in the lower  pole of the right kidney. No renal or ureteral calculi. No hydronephrosis or  hydroureter. The bladder is partially decompressed with Lizama catheter in place. There is marked circumferential bladder wall thickening and surrounding fat  stranding with mucosal enhancement. Small amount of dependent air noted within  the bladder lumen secondary to instrumentation. No definite foci of air noted  within the bladder wall. On delayed phase images, there are no suspicious  filling defects noted within the bilateral collecting systems. IMPRESSION:  1. Findings consistent with acute cystitis. Bladder is partially decompressed  with Lizama catheter in place. 2. No evidence of urolithiasis. No evidence of solid renal, ureteral or bladder  mass.  Multifocal cortical scarring throughout the left kidney, and cortical  scarring in the lower pole of the right kidney. 3. Mild pulmonary interstitial edema and small bilateral pleural effusions. afvss  WBC: wnl  : Bcx: PSEUDOMONAS   : Bcx: pending  Hgb: 7.2  Cr: 1.68  UA: 10-20 WBCs, >100 RBCs, 1+ bacteria  Ucx: negative  Urine cytology: suspicious for high-grade urothelial carcinoma  +zosyn  +0.8mg flomax, finasteride  +venofer      Vitals: Temp (24hrs), Av °F (36.7 °C), Min:97.4 °F (36.3 °C), Max:98.4 °F (36.9 °C)    Blood pressure 115/80, pulse 85, temperature 97.7 °F (36.5 °C), resp. rate 16, height 6' 2\" (1.88 m), weight 80.7 kg (177 lb 14.6 oz), SpO2 96 %. Intake and Output:   190 -  0700  In: 720 [P.O.:420; I.V.:300]  Out:  [Urine:]  No intake/output data recorded. THERESE Output lats 24 hrs: No data found. THERESE Output last 8 hrs: No data found. BM over last 24 hrs: No data found. Labs:  CBC:   Lab Results   Component Value Date/Time    WBC 6.4 2020 03:25 AM    HCT 23.7 (L) 2020 03:25 AM    PLATELET 891  03:25 AM     BMP:   Lab Results   Component Value Date/Time    Glucose 87 2020 03:25 AM    Sodium 135 (L) 2020 03:25 AM    Potassium 4.0 2020 03:25 AM    Chloride 104 2020 03:25 AM    CO2 24 2020 03:25 AM    BUN 26 (H) 2020 03:25 AM    Creatinine 1.68 (H) 2020 03:25 AM    Calcium 8.4 (L) 2020 03:25 AM     Assessment/Plan:     Urine cytology showing high-grade urothelial carcinom  Urinary Retention  Hx of BPH  Anemia  Microscopic hematuria  - cardiac clearance to proceed with cystoscopy and TURBT this admission.  Will be by later to discuss with patient d/t DNR status.  ----this is scheduled today, 20, at 1300 with Dr. Janes Reyna  - NPO   - ucx negative  - transfuse per protocol  - bladder scan BID, if >500 recommend straight cath  - strict I&Os  - continue flomax and finasteride     Case discussed with Dr. Jorge Dumont By: Jose Ramon De Paz NP - 2020     Seen and examined in the morning. Discussed cytology was concerning for underlying high-grade urothelial carcinoma of his bladder. Discussed cystoscopy with possible biopsy or TURBT depending upon the findings. Discussed with him risks including but not limited to bleeding, infection, injury to the bladder or need for additional surgeries. He and his wife are in agreement to proceed with the surgery knowing the risks. He has been off the blood thinners.

## 2020-07-23 NOTE — ANESTHESIA PREPROCEDURE EVALUATION
Relevant Problems   No relevant active problems       Anesthetic History   No history of anesthetic complications            Review of Systems / Medical History  Patient summary reviewed, nursing notes reviewed and pertinent labs reviewed    Pulmonary  Within defined limits                 Neuro/Psych   Within defined limits           Cardiovascular    Hypertension      CHF  Dysrhythmias : atrial fibrillation      Exercise tolerance: <4 METS  Comments: S/p lvad 2019   GI/Hepatic/Renal  Within defined limits              Endo/Other        Arthritis     Other Findings              Physical Exam    Airway  Mallampati: II  TM Distance: 4 - 6 cm  Neck ROM: normal range of motion   Mouth opening: Normal     Cardiovascular  Regular rate and rhythm,  S1 and S2 normal,  no murmur, click, rub, or gallop             Dental    Dentition: Full lower dentures and Full upper dentures     Pulmonary  Breath sounds clear to auscultation               Abdominal  GI exam deferred       Other Findings            Anesthetic Plan    ASA: 4  Anesthesia type: general    Monitoring Plan: Arterial line      Induction: Intravenous  Anesthetic plan and risks discussed with: Patient

## 2020-07-23 NOTE — PROGRESS NOTES
ID Progress Note  2020    Subjective: \"I feel pretty good. Hope I can get something to eat soon. \"     Review of Systems:            Symptom Y/N Comments   Symptom Y/N Comments   Fever/Chills n      Chest Pain  n      Poor Appetite       Edema        Cough n      Abdominal Pain        Sputum       Joint Pain        SOB/DOUGLAS  n     Pruritis/Rash        Nausea/vomit n      Tolerating PT/OT        Diarrhea  n     Tolerating Diet        Constipation  n     Other           Could NOT obtain due to:       Objective:     Vitals:   Visit Vitals  /80   Pulse 88   Temp 97.8 °F (36.6 °C)   Resp 18   Ht 6' 2\" (1.88 m)   Wt 80.7 kg (177 lb 14.6 oz)   SpO2 98%   BMI 22.84 kg/m²        Tmax:  Temp (24hrs), Av.9 °F (36.6 °C), Min:97.4 °F (36.3 °C), Max:98.2 °F (36.8 °C)      PHYSICAL EXAM:  General: Ill appearing. Alert, cooperative, no acute distress    EENT:  EOMI. Anicteric sclerae. MMM  Resp:  Clear in apex with decreased breath sound. no wheezing or rales. No accessory muscle use                          (+) LVAD hum  CV:  Regular  rhythm,  No edema  GI:  Soft, Non distended, Non tender. +Bowel sounds, LVAD insertion site dressing dry and intact                          Lizama in place; draining pink tinged urine. Neurologic:  Alert and oriented X 3, normal speech,   Psych:   Good insight. Not anxious nor agitated  Skin:  No rashes.   No jaundice    Labs:   Lab Results   Component Value Date/Time    WBC 6.4 2020 03:25 AM    HGB 7.2 (L) 2020 03:25 AM    HCT 23.7 (L) 2020 03:25 AM    PLATELET 544  03:25 AM    MCV 98.3 2020 03:25 AM     Lab Results   Component Value Date/Time    Sodium 135 (L) 2020 03:25 AM    Potassium 4.0 2020 03:25 AM    Chloride 104 2020 03:25 AM    CO2 24 2020 03:25 AM    Anion gap 7 2020 03:25 AM    Glucose 87 2020 03:25 AM    BUN 26 (H) 2020 03:25 AM    Creatinine 1.68 (H) 2020 03:25 AM    BUN/Creatinine ratio 15 07/23/2020 03:25 AM    GFR est AA 49 (L) 07/23/2020 03:25 AM    GFR est non-AA 41 (L) 07/23/2020 03:25 AM    Calcium 8.4 (L) 07/23/2020 03:25 AM    Bilirubin, total 0.9 07/22/2020 05:35 AM    Alk. phosphatase 94 07/22/2020 05:35 AM    Protein, total 7.6 07/22/2020 05:35 AM    Albumin 2.9 (L) 07/23/2020 03:25 AM    Globulin 4.2 (H) 07/22/2020 05:35 AM    A-G Ratio 0.8 (L) 07/22/2020 05:35 AM    ALT (SGPT) 18 07/22/2020 05:35 AM       Results     Procedure Component Value Units Date/Time    CULTURE, BLOOD, PAIRED [[id number]]  (Abnormal) Collected:  07/22/20 1026    Order Status:  Completed Specimen:  Blood Updated:  07/23/20 0942     Special Requests: NO SPECIAL REQUESTS        Culture result:       GRAM NEGATIVE RODS GROWING IN 2 OF 4 BOTTLES DRAWN (SITES = RW AND L AC)                  REMAINING BOTTLE(S) HAS/HAVE NO GROWTH SO FAR          CULTURE, WOUND Megha Shaker STAIN [[id number]]  (Abnormal)  (Susceptibility) Collected:  07/20/20 1420    Order Status:  Completed Specimen:  Wound Drainage Updated:  07/23/20 1035     Special Requests: NO SPECIAL REQUESTS        GRAM STAIN NO ORGANISMS SEEN        Culture result:       LIGHT STAPHYLOCOCCUS AUREUS          Susceptibility      Staphylococcus aureus     ANA     Ciprofloxacin ($) Resistant     Clindamycin ($) Resistant     Daptomycin ($$$$$) Susceptible     Doxycycline ($$) Susceptible     Erythromycin ($$$$) Resistant     Gentamicin ($) Susceptible     Levofloxacin ($) Resistant     Linezolid ($$$$$) Susceptible     Moxifloxacin ($$$$) Resistant     Oxacillin Susceptible     Rifampin ($$$$) Susceptible [1]      Tetracycline Susceptible     Trimeth/Sulfa Susceptible     Vancomycin ($) Susceptible            [1]   Rifampin is not to be used for mono-therapy.                  CULTURE, BLOOD, PAIRED [[id number]]  (Abnormal) Collected:  07/19/20 0438    Order Status:  Completed Specimen:  Blood Updated:  07/21/20 0817     Special Requests: NO SPECIAL REQUESTS        Culture result:       PSEUDOMONAS SPECIES ISOLATED FROM 2 OF 4 BOTTLES DRAWN, EACH FROM A DIFFERENT SITE. .. LFA AND RA. PLEASE REFER TO PREVIOUS [facility] [id number], COLLECTED 7/17/20, FOR FURTHER SPECIATION AND SUSCEPTIBILITIES                  PRELIMINARY REPORT OF GRAM NEGATIVE RODS GROWING IN 2 OF 4 BOTTLES DRAWN . ..(SITES= LFA AND R ARM) CALLED TO AND READ BACK BY DIANA POWELL ([place]) ON 07/20/2020 AT 0700 BY VY                  REMAINING BOTTLE(S) HAS/HAVE NO GROWTH SO FAR          CULTURE, BLOOD, PAIRED [[id number]]  (Abnormal)  (Susceptibility) Collected:  07/17/20 1859    Order Status:  Completed Specimen:  Blood Updated:  07/23/20 1101     Special Requests: NO SPECIAL REQUESTS        Culture result:       PSEUDOMONAS AERUGINOSA GROWING IN 2 OF 4 BOTTLES DRAWN . ..(SITES= RAC AND LAC)                  PRELIMINARY REPORT OF GRAM NEGATIVE RODS GROWING IN 2 OF 4 BOTTLES DRAWN CALLED TO AND READ BACK BY  MS VICKI WILKES AT 1805 ON 7/18/20. PJ                  REMAINING BOTTLE(S) HAS/HAVE NO GROWTH IN 5 DAYS          Susceptibility      Pseudomonas aeruginosa     ANA     Amikacin ($) Susceptible     Cefepime ($$) Susceptible     Ceftazidime ($) Susceptible     Ciprofloxacin ($) Resistant     Gentamicin ($) Susceptible     Levofloxacin ($) Resistant     Meropenem ($$) Susceptible     Piperacillin/Tazobac ($) Susceptible [1]      Tobramycin ($) Susceptible            [1]   **FDA INTERPRETATION REFLECTED, REFER TO [employer] FOR ALTERNATE INTERPRETATIONS. **                 CULTURE, URINE [[id number]]     Order Status:  Canceled Specimen:  Urine from Clean catch     URINE CULTURE HOLD SAMPLE [[id number]] Collected:  07/17/20 1151    Order Status:  Completed Specimen:  Urine from Serum Updated:  07/17/20 1204     Urine culture hold       Urine on hold in Microbiology dept for 2 days. If unpreserved urine is submitted, it cannot be used for addtional testing after 24 hours, recollection will be required.           CULTURE, URINE [[id number]] Collected:  07/17/20 1151    Order Status:  Completed Specimen:  Urine from Clean catch Updated:  07/18/20 9643     Special Requests: NO SPECIAL REQUESTS        Culture result:       No significant growth, <10,000 CFU/mL              Assessment and Plan   LVAD drive line infection  Persistent pseudomonas bacteremia  - blood cx (7/17,19, 22): pseudomonas, gram (-) rods    Ordered another Blood cx to be adams in am    Continue with IV Zosyn      May need lifelong IV ABX vs re-siting the LVAD    S/p cystoscopy and TURBT (7/23)  -  Urine cx (7/17):  No significant growth, <10,000 CFU/mL      Urine cytology (7/21): Suspicious for high-grade urothelial carcinoma       Urology following      AMS- pt is alert and orient x 3    Valarie Garza, NP

## 2020-07-23 NOTE — PROGRESS NOTES
[facility] in [place], [place]  Inpatient Progress Note      Patient name: Tobin Bella  Patient : 1950  Patient MRN: [id number]  Attending MD: Harmony Virgen MD  Date of service: 20    REASON FOR CONSULT:  UTI in patient with LVAD       24hr Events:  NTproBNP 94386, weight down 2lbs; 177lbs  MAPs 84-110mmHg  Plans for TURBT and cystoscopy this afternoon  Multiple PI events upon VAD interrogation     PLAN:  Increased LVAD device high speed to 6000rpm and adjust low speed to 5600rpm  Cannot tolerate beta-blockers d/t severe RV failure  Cannot tolerate ACE/ARB/ARNi due to persistent orthostasis despite increased fluid intake   Cannot tolerate spironolactone due to IVVD and hyponatremia  Continue digoxin 0.125 every other day, level 0.8; trend levels daily  Monitor daily CardioMEMs readings, 20-22mmHg (20)    Albumin IV 12.5mg   hydralazine 10mg IV prn MAP's > 95mmHg  LVEDD > 7cm; consider RHC to determine device speed  Unable to tolerate anticoagulation due to hematuria; awaiting LDH off aspirin  check hemoccult   UA (20) large blood, RBC >100, mod leukocytes  Urine cytology high-grade urothelial carcinoma, plans for diagnostic cystoscopy and TURBT today at 1300; moderate cardiac risk, will need cardiac anesthesia and LVAD coordinator for surgery   Nephrology consult appreciated  bladder and renal US (20) No evidence for hydronephrosis or mass lesion, small left kidney  Rack urine for 24 hrs; erica - tea color   Urology consult appreciated; recommending cystoscopy outpatient sending urine for cytology  Continue zosyn IV, ID consult appreciated  Blood cultures (20) preliminary GNRs in 2/4 bottles   Blood cultures (20) preliminary Pseudomonas 2/4 bottles preliminary report GNRs 2/4bottles   Wound culture (20) preliminary light prob staph aureus   Will eventually need PICC placed for long term IV ABX, waiting for negative blood cultures  Elevated sed rate- 109 (7/22/20)  Gentamicin to exit site with daily dressing changes  Continue soren Q po BID   Fiber con 625 po daily   Nutrition consult, appreciate recs  Continue keppra,  Level 15.5 (7/18/20)  Hospitalist assistance appreciated  DNR  Daily weights, regular cardiac diet, strict I/O  Trend CBC, CMP, NTproBNP, Mag, LD, procalcitonin, digoxin  Remain in CVSU     IMPRESSION:  UTI  Metabolic encephalopathy  Chronic systolic heart failure  Stage D, NYHA class II symptoms  Coronary artery disease  Ischemic cardiomyopathy, LVEF 15%  S/p HM3 LVAD as destination therapy (11/18/19 by Dr. Marcela Arizmendi)  S/p Impella 5.0 as bridge to LVAD  HTN, goal MAP 70-90mmHg  H/o VT s/p [place] BIV-ICD  PAF  H/o recurrent UTI, pseudomonal infection  Unable to tolerate AC due to hematuria  MSSA drive line infection  COPD  JOSE  Essential tremor on keppra  Depression on effexor  Persistently elevated CEA PET scan increased RML activity, not malignant per hemonc  Orthostatic hypotension  DNR  Up-to-date with PNA vaccine - per PCP/wife  2/4bottles blood cultures +pseudomonas (7/19/20)   diarrhea      Patient seen and examined. Data and note reviewed. I have discussed and agree with the plans as noted. 71year old male with a history of ICM s/p LVAD as DT who was admitted with pseudomonas bacteremia. Urine cytology with high grade urothelial carcinoma. Urology recommending diagnostic cytoscopy and TURBT today. Thank you for allowing us to participate in your patient's care. Mounika Caputo MD, [facility], [address] [place] [address]  Chief of Cardiology, [address] [place] [address], [address] [place] [address], [address] [place] [address]  Office [phone]  Fax [phone]        CARDIAC IMAGING:  TTE 4/20 shows large LVIDd, 6.84 cm, RVIDd 3.06 cm, TAPSE 1.15 cm, severely elevated CVP  TTE 7/6/20- Mild AI, LViDd 6.91cm, RVIDd 5.16cm, TAPSE 1.39cm  Echo (7/18/20)  · Contrast used: DEFINITY. · Image quality for this study was technically difficult. · Severely dilated left ventricle. Wall thickness appears thin. Severe systolic function. Estimated left ventricular ejection fraction is <15%. Visually measured ejection fraction. · Moderately dilated left atrium. · Moderately dilated right ventricle. Moderately to severely reduced systolic function. · Dilated right atrium. · Moderate mitral valve prolapse.       LVAD INTERROGATION:  Device interrogated in person, Increased Speed to 6000rpm, low speed to 5600rpm  Device function normal, normal flow, multiple PI events  LVAD   Pump Speed (RPM): 6000  Pump Flow (LPM): 5.2  MAP: 110  PI (Pulsitility Index): 2.9  Power: 4.8   Test: Yes  Back Up  at Bedside & Labeled: Yes  Power Module Test: Yes  Driveline Site Care: No  Driveline Dressing: Clean, Dry, and Intact  Outpatient: No  MAP in Therapeutic Range (Outpatient): Yes  Testing  Alarms Reviewed: Yes  Back up SC speed: 6000  Back up Low Speed Limit: 5600  Emergency Equipment with Patient?: Yes  Emergency procedures reviewed?: Yes  Drive line site inspected?: Yes  Drive line intergrity inspected?: Yes  Drive line dressing changed?: Yes    PHYSICAL EXAM:  Visit Vitals  /80   Pulse 85   Temp 97.7 °F (36.5 °C)   Resp 16   Ht 6' 2\" (1.88 m)   Wt 177 lb 14.6 oz (80.7 kg)   SpO2 96%   BMI 22.84 kg/m²     Review of Systems   Constitutional: Positive for malaise/fatigue. Negative for chills, fever and weight loss. HENT: Positive for hearing loss. Eyes: Negative. Respiratory: Positive for shortness of breath. Cardiovascular: Positive for leg swelling. Negative for chest pain, palpitations and orthopnea. Gastrointestinal: Negative. Genitourinary: Positive for hematuria and urgency. Negative for dysuria and flank pain. Musculoskeletal: Negative. Skin: Negative. Neurological: Positive for weakness.    Endo/Heme/Allergies: Bruises/bleeds easily. Psychiatric/Behavioral: Positive for depression. Physical Exam  Vitals signs and nursing note reviewed. Constitutional:       General: He is not in acute distress. Appearance: Normal appearance. HENT:      Head: Normocephalic. Mouth/Throat:      Mouth: Mucous membranes are moist.   Neck:      Vascular: No JVD. Cardiovascular:      Rate and Rhythm: Normal rate and regular rhythm. Heart sounds: Murmur present. Comments: +lvad hum  Pulmonary:      Effort: Pulmonary effort is normal.      Breath sounds: Normal breath sounds. Abdominal:      General: Bowel sounds are normal.      Palpations: Abdomen is soft. Musculoskeletal:      Right lower leg: Edema present. Left lower leg: Edema present. Skin:     General: Skin is warm and dry. Capillary Refill: Capillary refill takes 2 to 3 seconds. Comments: Scattered bruising on extremities   Neurological:      Mental Status: He is alert and oriented to person, place, and time. Motor: Weakness present. Psychiatric:         Attention and Perception: Attention normal.         Mood and Affect: Mood normal.         Speech: Speech normal.         Behavior: Behavior is cooperative. Thought Content:  Thought content normal.             PAST MEDICAL HISTORY:  Past Medical History:   Diagnosis Date    BPH (benign prostatic hyperplasia)     [facility]est[facility]     Degenerative disc disease, lumbar     Heart failure (HCC)     High cholesterol     Hypertension     Ischemic cardiomyopathy     LVAD (left ventricular assist device) present ([facility])     Paroxysmal atrial fibrillation ([address] 4/2/2019    Spinal stenosis        PAST SURGICAL HISTORY:  Past Surgical History:   Procedure Laterality Date    COLONOSCOPY Left 11/11/2019    COLONOSCOPY at bedside performed by Conrad Menjivar MD at [address] [place] [address] ENDOSCOPY    HX APPENDECTOMY      HX CORONARY ARTERY BYPASS GRAFT      triple    HX HEART ASSIST DEVICE Left 10/29/2019    Impella 5.0    HX HEART ASSIST DEVICE Left 2019    HeartMate 3    HX HERNIA REPAIR      HX IMPLANTABLE CARDIOVERTER DEFIBRILLATOR      HX THROMBECTOMY Left 2019    Left brachial thrombectomy    TN CARDIOVERSION ELECTIVE ARRHYTHMIA EXTERNAL N/A 6/10/2019    EP CARDIOVERSION performed by Alon Peter MD at [address] CATH LAB    TN CARDIOVERSION ELECTIVE ARRHYTHMIA EXTERNAL N/A 2019    EP CARDIOVERSION performed by Azul Garcia MD at [address] CATH LAB    TN INSJ/RPLCMT PERM DFB W/TRNSVNS LDS 1/DUAL CHMBR N/A 2019    INSERT ICD BIV MULTI performed by Jorge Butts MD at [address] CATH LAB    TN TCAT IMPL WRLS P-ART PRS SNR L-T HEMODYN MNTR N/A 2019    IMPLANT HEART FAILURE MONITORING DEVICE performed by Yessi Avelar MD at [address] CATH LAB       FAMILY HISTORY:  Family History   Problem Relation Age of Onset    Lung Disease Mother     Hypertension Mother     Arthritis-osteo Mother     Heart Disease Mother     Heart Disease Father     Diabetes Father        SOCIAL HISTORY:  Social History     Socioeconomic History    Marital status:      Spouse name: Not on file    Number of children: Not on file    Years of education: Not on file    Highest education level: Not on file   Tobacco Use    Smoking status: Former Smoker     Last attempt to quit: 2010     Years since quittin.6    Smokeless tobacco: Never Used   Substance and Sexual Activity    Alcohol use: Not Currently     Comment: rarely    Drug use: Never   Other Topics Concern       LABORATORY RESULTS:     Labs Latest Ref Rng & Units 2020   WBC 4.1 - 11.1 K/uL 6.4 6.2 - - - 5.8 -   RBC 4.10 - 5.70 M/uL 2.41(L) 2.46(L) - - - 2.23(L) -   Hemoglobin 12.1 - 17.0 g/dL 7. 2(L) 7. 2(L) 7. 2(L) - - 6. 7(L) -   Hematocrit 36.6 - 50.3 % 23. 7(L) 24. 0(L) 23. 6(L) - - 21. 8(L) -   MCV 80.0 - 99.0 FL 98.3 97.6 - - - 97.8 -   Platelets 520 - 652 K/uL 201 169 - - - 178 -   Lymphocytes 12 - 49 % 10(L) - - - - - -   Monocytes 5 - 13 % 7 - - - - - -   Eosinophils 0 - 7 % 3 - - - - - -   Basophils 0 - 1 % 1 - - - - - -   Albumin 3.5 - 5.0 g/dL 2. 9(L) 3.4(L) - 3. 2(L) 3. 2(L) 3. 1(L) 2. 9(L)   Calcium 8.5 - 10.1 MG/DL 8.4(L) 8.7 - 8.5 - - 8. 4(L)   Glucose 65 - 100 mg/dL 87 121(H) - 95 - - 104(H)   BUN 6 - 20 MG/DL 26(H) 24(H) - 21(H) - - 25(H)   Creatinine 0.70 - 1.30 MG/DL 1.68(H) 1.70(H) - 1.61(H) - - 1.79(H)   Sodium 136 - 145 mmol/L 135(L) 137 - 136 - - 136   Potassium 3.5 - 5.1 mmol/L 4.0 4.2 - 4.4 - - 4.1   TSH 0.36 - 3.74 uIU/mL - - - - - - -   PSA 0.01 - 4.0 ng/mL - - - - - - -   LDH 85 - 241 U/L 192 212 - - - 193 -   Some recent data might be hidden     Lab Results   Component Value Date/Time    TSH 1.13 07/18/2020 11:28 AM    TSH 1.70 04/21/2020 01:59 PM    TSH 2.30 12/03/2019 03:29 AM    TSH 2.40 10/25/2019 07:39 PM    TSH 2.45 06/01/2019 04:16 AM       ALLERGY:  Allergies   Allergen Reactions    Cefepime Other (comments)     myoclonus        CURRENT MEDICATIONS:    Current Facility-Administered Medications:     hydrALAZINE (APRESOLINE) 20 mg/mL injection 10 mg, 10 mg, IntraVENous, Q4H PRN, Pierre Grimes, NP    magnesium oxide (MAG-OX) tablet 400 mg, 400 mg, Oral, BID, Pierre Grimes ETIERRA, 400 mg at 07/23/20 0859    epoetin yvonne-epbx (RETACRIT) injection 10,000 Units, 10,000 Units, SubCUTAneous, Q MON, WED & Pam Schaefer MD, 10,000 Units at 07/22/20 2104    0.9% sodium chloride infusion 250 mL, 250 mL, IntraVENous, PRN, Shana Sims B, NP    calcium polycarbophiL (FIBERCON) tablet 625 mg, 1 Tab, Oral, DAILY, Pierre ACUNA NP, 625 mg at 07/23/20 0859    sodium chloride (NS) flush 5-40 mL, 5-40 mL, IntraVENous, Q8H, Hyun Zhao, DO, 10 mL at 07/23/20 0523    sodium chloride (NS) flush 5-40 mL, 5-40 mL, IntraVENous, PRN, Sara Bridges M, DO    acetaminophen (TYLENOL) tablet 650 mg, 650 mg, Oral, Q4H PRN, Chance Nanny, [employer] M, DO    diphenhydrAMINE (BENADRYL) injection 12.5 mg, 12.5 mg, IntraVENous, Q4H PRN, Chance Nanny, [employer] M, DO    ondansetron [facility]) injection 4 mg, 4 mg, IntraVENous, Q4H PRN, Jose Blane M, DO, 4 mg at 07/22/20 5891    bisacodyL (DULCOLAX) tablet 5 mg, 5 mg, Oral, DAILY PRN, Chance Nanny, [employer] M, DO    digoxin (LANOXIN) tablet 0.125 mg, 0.125 mg, Oral, EVERY OTHER DAY, Zhao, Hyun M, DO, 0.125 mg at 07/22/20 1040    finasteride (PROSCAR) tablet 5 mg, 5 mg, Oral, DAILY, Zhao, Hyun M, DO, 5 mg at 07/23/20 6104    lactobac ac& pc-s.therm-b.anim (TIAN Q/RISAQUAD), 1 Cap, Oral, DAILY, ZhaoMadhuri curran, DO, 1 Cap at 07/23/20 1519    levETIRAcetam (KEPPRA) tablet 250 mg, 250 mg, Oral, BID, Zhao, Hyun M, DO, 250 mg at 07/23/20 0859    tamsulosin (FLOMAX) capsule 0.8 mg, 0.8 mg, Oral, QHS, Zhao, Hyun M, DO, 0.8 mg at 07/22/20 2101    venlafaxine-SR (EFFEXOR-XR) capsule 150 mg, 150 mg, Oral, DAILY WITH BREAKFAST, ZhaoHyun curran M, DO, 150 mg at 07/23/20 0859    piperacillin-tazobactam (ZOSYN) 3.375 g in 0.9% sodium chloride (MBP/ADV) 100 mL, 3.375 g, IntraVENous, Q8H, Hyun Zhao M, DO, Last Rate: 25 mL/hr at 07/23/20 0523, 3.375 g at 07/23/20 0229    Thank you for allowing me to participate in this patient's care.     Kenn Lemus NP  [address] [place] [address]  Phone: [phone]  Fax: [phone]

## 2020-07-23 NOTE — PROGRESS NOTES
Nephrology Progress Note  oSna Kiser  Date of Admission : 7/17/2020    CC: Follow up for JUAN J on CKD       Assessment and Plan     JUAN J on CKD  - 2/2 volume depletion  - U/S neg for hydronephrosis  - Cr stable  - diurese PRN  - daily labs    HFrEF:  - EF 16-20%, NYHA Class IV , hx of VF arrest - s/p AICD  - s/p LVAD placement on 11/18     CKD Stage III   - Mild Left renal atrophy at baseline   - baseline Cr around 1.2 to 1.4     BPH w/ hx of urinary retention:  - bladder scan and SC if residual > 500  - on flomax and proscar     Hematuria:  - cytology + for urothielial ca  - for cysto and TURBT per urology    PAF s/p DCCV 6/19     Anemia:  - IV x 3 doses  - on PAVEL    Pseudomonas bacteremia:  - on zosyn  - per ID       Interval History:  Seen and examined. Cr stable today. Urine cytology results noted. On RA, breathing comfortably. No cp, n/v/d reported. Current Medications: all current  Medications have been eviewed in EPIC  Review of Systems: Pertinent items are noted in HPI. Objective:  Vitals:    Vitals:    07/22/20 2342 07/23/20 0310 07/23/20 0743 07/23/20 0756   BP:       Pulse: 85 88 85    Resp: 20 18 16    Temp: 98.1 °F (36.7 °C) 97.4 °F (36.3 °C) 97.7 °F (36.5 °C)    SpO2: 97% 98% 96%    Weight:    80.7 kg (177 lb 14.6 oz)   Height:         Intake and Output:  No intake/output data recorded. 07/21 1901 - 07/23 0700  In: 720 [P.O.:420;  I.V.:300]  Out: 2050 [Urine:2050]    Physical Examination:  Pt intubated     No  General: NAD,Conversant   Neck:  Supple, no mass  Resp:  Lungs CTA B/L, no wheezing , normal respiratory effort  CV:  RRR,  + VAD sounds, no LE edema  GI:  Soft, NT, + Bowel sounds, no hepatosplenomegaly  Neurologic:  Non focal  Psych:             AAO x 3 appropriate affect   Skin:  No Rash  :  No neal    []    High complexity decision making was performed  []    Patient is at high-risk of decompensation with multiple organ involvement    Lab Data Personally Reviewed: I have reviewed all the pertinent labs, microbiology data and radiology studies during assessment.     Recent Labs     07/23/20  0325 07/22/20  0535 07/21/20  0453 07/20/20  1544   * 137 136 136   K 4.0 4.2 4.4 4.1    107 107 105   CO2 24 22 21 25   GLU 87 121* 95 104*   BUN 26* 24* 21* 25*   CREA 1.68* 1.70* 1.61* 1.79*   CA 8.4* 8.7 8.5 8.4*   MG 2.3 2.4 2.5* 2.6*   PHOS 2.9 3.2  --   --    ALB 2.9* 3.4* 3.2*  3.2*  3.1* 2.9*   ALT  --  18 10*  9*  10* 10*     Recent Labs     07/23/20 0325 07/22/20  0535 07/21/20  1057 07/21/20  0453   WBC 6.4 6.2  --  5.8   HGB 7.2* 7.2* 7.2* 6.7*   HCT 23.7* 24.0* 23.6* 21.8*    169  --  178     No results found for: SDES  Lab Results   Component Value Date/Time    Culture result: (A) 07/22/2020 10:26 AM     GRAM NEGATIVE RODS GROWING IN 1 OF 4 BOTTLES DRAWN (SITE = )    Culture result: REMAINING BOTTLE(S) HAS/HAVE NO GROWTH SO FAR 07/22/2020 10:26 AM    Culture result: LIGHT PROBABLE STAPHYLOCOCCUS AUREUS (A) 07/20/2020 02:20 PM     Recent Results (from the past 24 hour(s))   CULTURE, BLOOD, PAIRED    Collection Time: 07/22/20 10:26 AM    Specimen: Blood   Result Value Ref Range    Special Requests: NO SPECIAL REQUESTS      Culture result: (A)       GRAM NEGATIVE RODS GROWING IN 1 OF 4 BOTTLES DRAWN (SITE = )    Culture result: REMAINING BOTTLE(S) HAS/HAVE NO GROWTH SO FAR     RENAL FUNCTION PANEL    Collection Time: 07/23/20  3:25 AM   Result Value Ref Range    Sodium 135 (L) 136 - 145 mmol/L    Potassium 4.0 3.5 - 5.1 mmol/L    Chloride 104 97 - 108 mmol/L    CO2 24 21 - 32 mmol/L    Anion gap 7 5 - 15 mmol/L    Glucose 87 65 - 100 mg/dL    BUN 26 (H) 6 - 20 MG/DL    Creatinine 1.68 (H) 0.70 - 1.30 MG/DL    BUN/Creatinine ratio 15 12 - 20      GFR est AA 49 (L) >60 ml/min/1.73m2    GFR est non-AA 41 (L) >60 ml/min/1.73m2    Calcium 8.4 (L) 8.5 - 10.1 MG/DL    Phosphorus 2.9 2.6 - 4.7 MG/DL    Albumin 2.9 (L) 3.5 - 5.0 g/dL   DIGOXIN    Collection Time: 07/23/20  3:25 AM   Result Value Ref Range    Digoxin level 0.8 (L) 0.90 - 2.00 NG/ML   MAGNESIUM    Collection Time: 07/23/20  3:25 AM   Result Value Ref Range    Magnesium 2.3 1.6 - 2.4 mg/dL   LD    Collection Time: 07/23/20  3:25 AM   Result Value Ref Range     85 - 241 U/L   NT-PRO BNP    Collection Time: 07/23/20  3:25 AM   Result Value Ref Range    NT pro-BNP 32,591 (H) <125 PG/ML   PROCALCITONIN    Collection Time: 07/23/20  3:25 AM   Result Value Ref Range    Procalcitonin 0.44 ng/mL   CBC WITH AUTOMATED DIFF    Collection Time: 07/23/20  3:25 AM   Result Value Ref Range    WBC 6.4 4.1 - 11.1 K/uL    RBC 2.41 (L) 4.10 - 5.70 M/uL    HGB 7.2 (L) 12.1 - 17.0 g/dL    HCT 23.7 (L) 36.6 - 50.3 %    MCV 98.3 80.0 - 99.0 FL    MCH 29.9 26.0 - 34.0 PG    MCHC 30.4 30.0 - 36.5 g/dL    RDW 15.9 (H) 11.5 - 14.5 %    PLATELET 576 457 - 408 K/uL    MPV 9.8 8.9 - 12.9 FL    NRBC 0.0 0  WBC    ABSOLUTE NRBC 0.00 0.00 - 0.01 K/uL    NEUTROPHILS 78 (H) 32 - 75 %    LYMPHOCYTES 10 (L) 12 - 49 %    MONOCYTES 7 5 - 13 %    EOSINOPHILS 3 0 - 7 %    BASOPHILS 1 0 - 1 %    IMMATURE GRANULOCYTES 1 (H) 0.0 - 0.5 %    ABS. NEUTROPHILS 5.0 1.8 - 8.0 K/UL    ABS. LYMPHOCYTES 0.6 (L) 0.8 - 3.5 K/UL    ABS. MONOCYTES 0.4 0.0 - 1.0 K/UL    ABS. EOSINOPHILS 0.2 0.0 - 0.4 K/UL    ABS. BASOPHILS 0.1 0.0 - 0.1 K/UL    ABS. IMM. GRANS. 0.1 (H) 0.00 - 0.04 K/UL    DF SMEAR SCANNED      RBC COMMENTS ANISOCYTOSIS  1+        RBC COMMENTS MACROCYTOSIS  1+        RBC COMMENTS OVALOCYTES  1+        RBC COMMENTS POLYCHROMASIA  1+                   Isauro Berger MD  [facility]   [address] [address]  Phone - [phone]   Fax - [phone]  [email or web address]

## 2020-07-23 NOTE — ANESTHESIA PROCEDURE NOTES
Arterial Line Placement    Start time: 7/23/2020 1:16 PM  End time: 7/23/2020 1:21 PM  Performed by: Sylvia Duvall MD  Authorized by:  Sylvia Duvall MD     Pre-Procedure  Indications:  Arterial pressure monitoring  Preanesthetic Checklist: patient identified, risks and benefits discussed, site marked, patient being monitored, timeout performed and patient being monitored      Procedure:   Prep:  Chlorhexidine  Seldinger Technique?: Yes    Orientation:  Right  Location:  Radial artery  Catheter size:  20 G  Number of attempts:  1  Cont Cardiac Output Sensor: No      Assessment:   Post-procedure:  Line secured and sterile dressing applied  Patient Tolerance:  Patient tolerated the procedure well with no immediate complications

## 2020-07-23 NOTE — PROGRESS NOTES
1930 Bedside and Verbal shift change report given to Lawyer Calixto (oncoming nurse) by Blanka Pyle, KATRIN (offgoing nurse). Report included the following information SBAR, Kardex, MAR and Alarm Parameters . 0730 Bedside and Verbal shift change report given to ЕЛЕНА Aguirre RN (oncoming nurse) by Lux Bahena RN (offgoing nurse). Report included the following information SBAR, Kardex, MAR and Alarm Parameters .

## 2020-07-23 NOTE — PROGRESS NOTES
[facility] [facility]  Hospitalist Group                                                                                          Hospitalist Progress Note  Kennedy Serrano MD  Answering service: [phone] from in house phone        Date of Service:  2020  NAME:  Viviana Gates  :  1950  MRN:  [id number]      Admission Summary:      71year old male with past medical history heart failure, LVEF 15%, on LVAD, paroxysmal atrial fibrillation, BPH, recent admission for hematuria, presenting to [facility] with progressive and worsening confusion.  Patient seen and examined and currently tangential in thought process.  Discussed with wife Carter Lynn, via phone.  Per wife, patient has been progressively confused during the past week.  Reports he has not slept for approximately 3 nights.  At times, patient is coherent but then intermittently confused. [facility] has been doing things out of the ordinary such as calling his children early in the morning or late at night.  Also calling his siblings multiple times.  Per wife, patient is [employer] but usually does not speak about God to others. Kuldeep Ignacioes my encounter, patient continuously spoke about the [employer] in his past.  Patient alert to name, place, situation but not year.  Denies chest pain, shortness of breath, cough, fevers, chills, sweats, urinary frequency, dysuria, abdominal pain.  Endorses hematuria     Interval history / Subjective:     Patient is status post cystoscopy. No acute complaint. Assessment & Plan:     Acute encephalopathy,  -likely metabolic from infection, back to baseline     Bacteremia  -History of LVAD driveline infection and bacteremia with Pseudomonas in the past  -Cultures still positive  -ID following   -Currently on Zosyn    Chronic systolic heart failure EF 15%, s/p LVAD.    -Advanced heart failure cardiology team on board  -ECHO done EF <15%     Acute renal failure  -creatinine 1.88 on admission   -nephrology following     Paroxysmal atrial fibrillation   -on digoxin  -Monitor     History of hematuria and anemia  -Coumadin discontinued on previous hospitalization due to same  -Urine cytology suspicious for high-grade urothelial carcinoma  -Urology following  -Status post cystoscopy today    Anemia of chronic disease and Iron deficiency   -Status post transfusion of 1 unit PRBCs  - Monitor labs   - Will transfuse for hgb <7.0   - Received iron infusion   - Nephrology following     Hyponatremia  - Improved   - Monitor labs     Seizure Disorder   - Continue Keppra     BPH  -Continue Finasteride and Tamsulosin     Depression  - Continue Effexor- XR     Code status: DNR   DVT prophylaxis: SCDs    Care Plan discussed with: Patient/Family and Nurse  Anticipated Disposition: Home w/Family  Anticipated Discharge: 24 hours to 48 hours     Hospital Problems  Date Reviewed: 7/23/2020          Codes Class Noted POA    Acute encephalopathy ICD-10-CM: G93.40  ICD-9-CM: 348.30  7/17/2020 Unknown                Review of Systems:   A comprehensive review of systems was negative except for that written in the HPI. Vital Signs:    Last 24hrs VS reviewed since prior progress note. Most recent are:  Visit Vitals  /84   Pulse 91   Temp (!) 96.2 °F (35.7 °C)   Resp 22   Ht 6' 2\" (1.88 m)   Wt 80.7 kg (177 lb 14.6 oz)   SpO2 92%   BMI 22.84 kg/m²         Intake/Output Summary (Last 24 hours) at 7/23/2020 1908  Last data filed at 7/23/2020 1757  Gross per 24 hour   Intake 1040 ml   Output 1350 ml   Net -310 ml        Physical Examination:             Constitutional:  No acute distress, cooperative, pleasant, answers questions appropriately, appears stated age    ENT:  Oral mucosa moist, oropharynx benign. Resp:  CTA bilaterally. No wheezing/rhonchi/rales. No accessory muscle use   CV:  Regular irregular rhythm, normal rate, LVAD in place - Drive line noted to left lower abdomen     GI:  Soft, non distended, non tender. , unable to auscultate bowel sounds due to LVAD. Musculoskeletal:  No edema, warm, 2+ pulses throughout    Neurologic:  Moves all extremities with generalized weakness. AAOx2-3, CN II-XII reviewed     Psych:  Depressive mood   Skin:  Good turgor, no rashes or ulcers       Data Review:    Review and/or order of clinical lab test  Review and/or order of tests in the radiology section of CPT  Review and/or order of tests in the medicine section of CPT      Labs:     Recent Labs     07/23/20 0325 07/22/20 0535   WBC 6.4 6.2   HGB 7.2* 7.2*   HCT 23.7* 24.0*    169     Recent Labs     07/23/20 0325 07/22/20 0535 07/21/20  0453   * 137 136   K 4.0 4.2 4.4    107 107   CO2 24 22 21   BUN 26* 24* 21*   CREA 1.68* 1.70* 1.61*   GLU 87 121* 95   CA 8.4* 8.7 8.5   MG 2.3 2.4 2.5*   PHOS 2.9 3.2  --      Recent Labs     07/23/20 0325 07/22/20 0535 07/21/20  0453   ALT  --  18 10*  9*  10*   AP  --  94 88  89  91   TBILI  --  0.9 0.7  0.7  0.7   TP  --  7.6 6.8  6.7  7.2   ALB 2.9* 3.4* 3.2*  3.2*  3.1*   GLOB  --  4.2* 3.6  3.5  4.1*     No results for input(s): INR, PTP, APTT, INREXT, INREXT in the last 72 hours. Recent Labs     07/21/20 0453   TIBC 146*   PSAT 58*   FERR 511*      Lab Results   Component Value Date/Time    Folate 16.5 01/16/2020 04:57 AM      No results for input(s): PH, PCO2, PO2 in the last 72 hours. No results for input(s): CPK, CKNDX, TROIQ in the last 72 hours.     No lab exists for component: CPKMB  Lab Results   Component Value Date/Time    Cholesterol, total 90 10/26/2019 04:09 AM    HDL Cholesterol 21 10/26/2019 04:09 AM    LDL, calculated 56.2 10/26/2019 04:09 AM    Triglyceride 64 10/26/2019 04:09 AM    CHOL/HDL Ratio 4.3 10/26/2019 04:09 AM     Lab Results   Component Value Date/Time    Glucose (POC) 119 (H) 07/18/2020 09:42 PM    Glucose (POC) 124 (H) 07/18/2020 04:45 PM    Glucose (POC) 116 (H) 07/18/2020 11:33 AM    Glucose (POC) 109 (H) 07/18/2020 06:13 AM Glucose (POC) 99 01/27/2020 11:11 AM     Lab Results   Component Value Date/Time    Color YELLOW/STRAW 07/21/2020 12:43 PM    Appearance CLOUDY (A) 07/21/2020 12:43 PM    Specific gravity 1.020 07/21/2020 12:43 PM    Specific gravity 1.015 10/31/2019 11:00 AM    pH (UA) 6.0 07/21/2020 12:43 PM    Protein 300 (A) 07/21/2020 12:43 PM    Glucose Negative 07/21/2020 12:43 PM    Ketone Negative 07/21/2020 12:43 PM    Bilirubin Negative 07/21/2020 12:43 PM    Urobilinogen 0.2 07/21/2020 12:43 PM    Nitrites Negative 07/21/2020 12:43 PM    Leukocyte Esterase MODERATE (A) 07/21/2020 12:43 PM    Epithelial cells MODERATE (A) 07/21/2020 12:43 PM    Bacteria Negative 07/21/2020 12:43 PM    WBC 10-20 07/21/2020 12:43 PM    RBC >100 (H) 07/21/2020 12:43 PM         Medications Reviewed:     Current Facility-Administered Medications   Medication Dose Route Frequency    hydrALAZINE (APRESOLINE) 20 mg/mL injection 10 mg  10 mg IntraVENous Q4H PRN    magnesium oxide (MAG-OX) tablet 400 mg  400 mg Oral BID    epoetin yvonne-epbx (RETACRIT) injection 10,000 Units  10,000 Units SubCUTAneous Q MON, WED & FRI    0.9% sodium chloride infusion 250 mL  250 mL IntraVENous PRN    calcium polycarbophiL (FIBERCON) tablet 625 mg  1 Tab Oral DAILY    sodium chloride (NS) flush 5-40 mL  5-40 mL IntraVENous Q8H    sodium chloride (NS) flush 5-40 mL  5-40 mL IntraVENous PRN    acetaminophen (TYLENOL) tablet 650 mg  650 mg Oral Q4H PRN    diphenhydrAMINE (BENADRYL) injection 12.5 mg  12.5 mg IntraVENous Q4H PRN    ondansetron (ZOFRAN) injection 4 mg  4 mg IntraVENous Q4H PRN    bisacodyL (DULCOLAX) tablet 5 mg  5 mg Oral DAILY PRN    digoxin (LANOXIN) tablet 0.125 mg  0.125 mg Oral EVERY OTHER DAY    finasteride (PROSCAR) tablet 5 mg  5 mg Oral DAILY    lactobac ac& pc-s.therm-b.anim (TIAN Q/RISAQUAD)  1 Cap Oral DAILY    levETIRAcetam (KEPPRA) tablet 250 mg  250 mg Oral BID    tamsulosin (FLOMAX) capsule 0.8 mg  0.8 mg Oral QHS    venlafaxine-SR (EFFEXOR-XR) capsule 150 mg  150 mg Oral DAILY WITH BREAKFAST    piperacillin-tazobactam (ZOSYN) 3.375 g in 0.9% sodium chloride (MBP/ADV) 100 mL  3.375 g IntraVENous Q8H     ______________________________________________________________________  EXPECTED LENGTH OF STAY: 4d 16h  ACTUAL LENGTH OF STAY:          3                 Key San MD

## 2020-07-23 NOTE — PROGRESS NOTES
[facility] VAD/Heart Failure Progress Note    Admit Date: 2020  POD:  Day of Surgery    Procedure:  Procedure(s):  CYSTO RETROGRADE PYELOGRAM POSSIBLE BLADDER BIOPSY/ TURBT/         Subjective:   Mild dyspnea, fatigue, and weakness; scopes today      Objective:   Vitals:  Blood pressure 115/80, pulse 88, temperature 97.8 °F (36.6 °C), resp. rate 18, height 6' 2\" (1.88 m), weight 177 lb 14.6 oz (80.7 kg), SpO2 98 %. Temp (24hrs), Av.9 °F (36.6 °C), Min:97.4 °F (36.3 °C), Max:98.2 °F (36.8 °C)    Hemodynamics:   CO:     CI:     CVP:     SVR:     PAP Systolic:     PAP Diastolic:     PVR:     BI63:     SCV02:      VAD Interrogation: LVAD (Heartmate)  Pump Speed (RPM): 6000  Pump Flow (LPM): 5.2  Chatter in Lines: No  PI (Pulsitility Index): 2.9  Power: 4.8  MAP: 82   Test: Yes  Back Up  at Bedside & Labeled: Yes  Power Module Test: Yes  Driveline Site Care: No  Driveline Dressing: Clean, Dry, and Intact    EKG/Rhythm:      Extubation Date / Time:     CT Output:     Ventilator:       Oxygen Therapy:  Oxygen Therapy  O2 Sat (%): 98 % (20 1147)  Pulse via Oximetry: 117 beats per minute (20 2300)  O2 Device: Nasal cannula (20 0743)  O2 Flow Rate (L/min): 2 l/min (20 0743)    CXR:    Admission Weight: Last Weight   Weight: 175 lb 0.7 oz (79.4 kg) Weight: 177 lb 14.6 oz (80.7 kg)     Intake / Output / Drain:  Current Shift:  07 -  1900  In: 200 [I.V.:200]  Out: -   Last 24 hrs.:     Intake/Output Summary (Last 24 hours) at 2020 1240  Last data filed at 2020 0743  Gross per 24 hour   Intake 320 ml   Output 1450 ml   Net -1130 ml           No results for input(s): CPK, CKMB, TROIQ in the last 72 hours.   Recent Labs     20  0325 20  0535 20  1057 20  0453   * 137  --  136   K 4.0 4.2  --  4.4   CO2 24 22  --  21   BUN 26* 24*  --  21*   CREA 1.68* 1.70*  --  1.61*   GLU 87 121*  --  95 PHOS 2.9 3.2  --   --    MG 2.3 2.4  --  2.5*   WBC 6.4 6.2  --  5.8   HGB 7.2* 7.2* 7.2* 6.7*   HCT 23.7* 24.0* 23.6* 21.8*    169  --  178     No results for input(s): INR, PTP, APTT, INREXT in the last 72 hours.   No lab exists for component: PBNP        Current Facility-Administered Medications:     hydrALAZINE (APRESOLINE) 20 mg/mL injection 10 mg, 10 mg, IntraVENous, Q4H PRN, Андрей Dai NP    magnesium oxide (MAG-OX) tablet 400 mg, 400 mg, Oral, BID, Андрей Dai NP, 400 mg at 07/23/20 0859    epoetin yvonne-epbx (RETACRIT) injection 10,000 Units, 10,000 Units, SubCUTAneous, Q MON, WED & Zion Mott MD, 10,000 Units at 07/22/20 2104    0.9% sodium chloride infusion 250 mL, 250 mL, IntraVENous, PRN, Shana Sims, NP    calcium polycarbophiL (FIBERCON) tablet 625 mg, 1 Tab, Oral, DAILY, Андрей Dai NP, 625 mg at 07/23/20 0859    sodium chloride (NS) flush 5-40 mL, 5-40 mL, IntraVENous, Q8H, Hyun Zhao, , 10 mL at 07/23/20 0523    sodium chloride (NS) flush 5-40 mL, 5-40 mL, IntraVENous, PRN, Anselmo FRENCH DO    acetaminophen (TYLENOL) tablet 650 mg, 650 mg, Oral, Q4H PRN, Hyun Lombardi,     diphenhydrAMINE (BENADRYL) injection 12.5 mg, 12.5 mg, IntraVENous, Q4H PRN, [place] Yo, [employer] M, DO    ondansetron [facility]) injection 4 mg, 4 mg, IntraVENous, Q4H PRN, Anselmo FRENCH DO, 4 mg at 07/22/20 7525    bisacodyL (DULCOLAX) tablet 5 mg, 5 mg, Oral, DAILY PRN, Anselmo FRENCH DO    digoxin (LANOXIN) tablet 0.125 mg, 0.125 mg, Oral, EVERY OTHER DAY, Hyun Zhao DO, 0.125 mg at 07/22/20 1040    finasteride (PROSCAR) tablet 5 mg, 5 mg, Oral, DAILY, Hyun Zhao DO, 5 mg at 07/23/20 0123    [facility] ac& pc-s.therm-b.anim (TIAN Q/RISAQUAD), 1 Cap, Oral, DAILY, Hyun Zhao DO, 1 Cap at 07/23/20 0899    levETIRAcetam (KEPPRA) tablet 250 mg, 250 mg, Oral, BID, Anselmo FRENCH DO, 250 mg at 07/23/20 9030   tamsulosin (FLOMAX) capsule 0.8 mg, 0.8 mg, Oral, QHS, Hyun Zhao M, DO, 0.8 mg at 07/22/20 2101    venlafaxine-SR (EFFEXOR-XR) capsule 150 mg, 150 mg, Oral, DAILY WITH BREAKFAST, Pavel, Hyun M, DO, 150 mg at 07/23/20 0859    piperacillin-tazobactam (ZOSYN) 3.375 g in 0.9% sodium chloride (MBP/ADV) 100 mL, 3.375 g, IntraVENous, Q8H, Hyun Zhao M, DO, Last Rate: 25 mL/hr at 07/23/20 1232, 3.375 g at 07/23/20 1232      A/P  S/P LVAD - good flows  Need for anticoagulation - on hodl due to recurrent bleeds  UTI - abx's  Confusion - monitor     Risk of morbidity and mortality - high  Medical decision making - high complexity    Signed By: Jamelle Bosworth, MD

## 2020-07-23 NOTE — PROGRESS NOTES
ID Progress Note  2020    Subjective:     Doing better overall, still fatigues easily--somewhat depressed, no physical complaints, however. Cystoscopy with removal of lesion today. Objective:     Antibiotics:  1. Zosyn       Vitals:   Visit Vitals  /84   Pulse 91   Temp (!) 96.2 °F (35.7 °C)   Resp 22   Ht 6' 2\" (1.88 m)   Wt 80.7 kg (177 lb 14.6 oz)   SpO2 92%   BMI 22.84 kg/m²        Tmax:  Temp (24hrs), Av.6 °F (36.4 °C), Min:96.2 °F (35.7 °C), Max:98.1 °F (36.7 °C)      Exam:  Lungs:  clear to auscultation bilaterally  Heart:  regular rate and rhythm  Abdomen:  soft, non-tender. Bowel sounds normal. No masses,  no organomegaly    Labs:      Recent Labs     20  0325 20  0535 20  1057 20  0453   WBC 6.4 6.2  --  5.8   HGB 7.2* 7.2* 7.2* 6.7*    169  --  178   BUN 26* 24*  --  21*   CREA 1.68* 1.70*  --  1.61*   AP  --  94  --  88  89  91   TBILI  --  0.9  --  0.7  0.7  0.7       Cultures:     No results found for: [facility]  Lab Results   Component Value Date/Time    Culture result: (A) 2020 10:26 AM     GRAM NEGATIVE RODS GROWING IN 2 OF 4 BOTTLES DRAWN (SITES = RW AND L AC)    Culture result: REMAINING BOTTLE(S) HAS/HAVE NO GROWTH SO FAR 2020 10:26 AM    Culture result: LIGHT STAPHYLOCOCCUS AUREUS (A) 2020 02:20 PM       Radiology:     Line/Insert Date:           Assessment:     1. Drive line infection  2. Recurrent Pseudomonas bacteremia--persistent  3. Organism resistant to oral antibiotic options    Objective:     1. Change to Merrem in face of ongoing bacteremia  2. Further management options include lifelong IV antibiotic therapy vs re-siting the LVAD--unfortunately, Pseudomonas is good at adapting to antibiotic agents  3.  Case discussed    Isabel Bonilla MD

## 2020-07-23 NOTE — PROGRESS NOTES
0730: Bedside and Verbal shift change report given to Carla, [facility] (oncoming nurse) by Severa Hugger, RN (offgoing nurse). Report included the following information SBAR, Kardex, Procedure Summary, Intake/Output, MAR, Accordion, Recent Results and Cardiac Rhythm paced. 1020: urology NP at bedside, plan for cystoscopy this afternoon pending heart failure clearance. MD supposed to come see patient at lunch time    1100: heart failure at bedside, ok to go to OR this afternoon pending LVAD coordinator availability    1258: TRANSFER - OUT REPORT:    Verbal report given to [facility] EVA RN(name) on Ana Walters  being transferred to OR Holding(unit) for ordered procedure       Report consisted of patients Situation, Background, Assessment and   Recommendations(SBAR). Information from the following report(s) SBAR, Kardex, Intake/Output, MAR, Accordion, Recent Results and Cardiac Rhythm paced was reviewed with the receiving nurse. Opportunity for questions and clarification was provided. Patient transported with:   Monitor  Registered Nurse  Tech   LVAD RN    LVAD coordinator on the way to OR holding for procedure    Patient to recover in CVICU and return to CVSU after     1750: TRANSFER - IN REPORT:    Verbal report received from [address], RN(name) on Ana Walters  being received from CVICU(unit) for routine progression of care      Report consisted of patients Situation, Background, Assessment and   Recommendations(SBAR). Information from the following report(s) SBAR, Kardex, Intake/Output, MAR, Accordion, Recent Results and Cardiac Rhythm paced was reviewed with the receiving nurse. Opportunity for questions and clarification was provided. Assessment completed upon patients arrival to unit and care assumed. 1930: Bedside and Verbal shift change report given to Fabian Iraheta RN (oncoming nurse) by KATRIN Aguirre (offgoing nurse).  Report included the following information SBAR, Kardex, Intake/Output, MAR, Accordion, Recent Results and Cardiac Rhythm paced. Problem: Falls - Risk of  Goal: *Absence of Falls  Description: Document Santhosh Evan Fall Risk and appropriate interventions in the flowsheet. Outcome: Progressing Towards Goal  Note: Fall Risk Interventions:  Mobility Interventions: Communicate number of staff needed for ambulation/transfer, OT consult for ADLs, Patient to call before getting OOB, PT Consult for mobility concerns, PT Consult for assist device competence         Medication Interventions: Evaluate medications/consider consulting pharmacy, Patient to call before getting OOB, Teach patient to arise slowly    Elimination Interventions: Call light in reach, Patient to call for help with toileting needs, Toileting schedule/hourly rounds, Urinal in reach              Problem: Patient Education: Go to Patient Education Activity  Goal: Patient/Family Education  Outcome: Progressing Towards Goal     Problem: LVAD: Discharge Outcomes  Goal: *Hemodynamically stable  Outcome: Progressing Towards Goal  Goal: *Lungs clear or at baseline  Outcome: Progressing Towards Goal  Goal: *Tolerating diet  Outcome: Progressing Towards Goal  Goal: *LVAD parameters within set limits  Outcome: Progressing Towards Goal  Goal: *LVAD drive line site without signs and symptoms of wound complications  Outcome: Progressing Towards Goal     Problem: Pressure Injury - Risk of  Goal: *Prevention of pressure injury  Description: Document Mich Scale and appropriate interventions in the flowsheet. Outcome: Progressing Towards Goal  Note: Pressure Injury Interventions:             Activity Interventions: Increase time out of bed, Pressure redistribution bed/mattress(bed type), PT/OT evaluation    Mobility Interventions: Assess need for specialty bed, Chair cushion, Pressure redistribution bed/mattress (bed type), PT/OT evaluation    Nutrition Interventions: Document food/fluid/supplement intake                     Problem: Patient Education: Go to Patient Education Activity  Goal: Patient/Family Education  Outcome: Progressing Towards Goal     Problem: Nutrition Deficit  Goal: *Optimize nutritional status  Outcome: Progressing Towards Goal

## 2020-07-23 NOTE — NURSE NAVIGATOR
HF NN took CARDIOMEMS reading [facility] (HEU). Excellent signal strength; however waveform picking up more than heart rate, HEU showing HR of 140-180's yet HR on monitor was 87. PAd's ranging 20-22. Cannot verify validity of PAd. This finding  was reported verbally to Dr. Grant Hills.

## 2020-07-23 NOTE — PROGRESS NOTES
1535: Received pt from PACU. Bedside report received from CRNA and Surgical RN. Pt A/Ox4, no needs or complaints at this time. Lizama draining without difficulty. 1657: 10 mg hydralazine given. .    1705: [facility] Durham [facility]    1730: MAP 70.    1753: Rt radial art line removed. 1810: TRANSFER - OUT REPORT:    Verbal report given to Carla (name) on Allan Lentz  being transferred to [place] [address] (unit) for routine progression of care       Report consisted of patients Situation, Background, Assessment and   Recommendations(SBAR). Information from the following report(s) SBAR, Procedure Summary, Recent Results and Cardiac Rhythm Paced was reviewed with the receiving nurse. Lines:   Peripheral IV 07/20/20 Posterior;Proximal;Right Forearm (Active)   Site Assessment Clean, dry, & intact 07/23/20 1535   Phlebitis Assessment 0 07/23/20 1535   Infiltration Assessment 0 07/23/20 1535   Dressing Status Clean, dry, & intact 07/23/20 1535   Dressing Type Transparent;Tape 07/23/20 1535   Hub Color/Line Status Blue;Flushed;Capped 07/23/20 1535   Action Taken Open ports on tubing capped 07/23/20 1535   Alcohol Cap Used Yes 07/23/20 1535       Peripheral IV 07/23/20 Left Antecubital (Active)   Site Assessment Clean, dry, & intact 07/23/20 1535   Phlebitis Assessment 0 07/23/20 1535   Infiltration Assessment 0 07/23/20 1535   Dressing Status New;Clean, dry, & intact 07/23/20 1535   Dressing Type Transparent 07/23/20 1535   Hub Color/Line Status Pink;Flushed;Capped 07/23/20 1535   Action Taken Tubing changed;Dressing changed 07/23/20 1535   Alcohol Cap Used Yes 07/23/20 1535        Opportunity for questions and clarification was provided.       Patient transported with:   Monitor  Registered Nurse

## 2020-07-23 NOTE — OP NOTES
Urology    Pre op dx: hematuria and bladder wall thickening on CT.   Post op dx: large papillary and solid bladder tumor on posterior wall  Procedure: cystoscopy, transurethral resection of large bladder tumor  Surgeon: Katharine Alejo MD  Assistant: staff  Findings: normal urethra, moderately obstructing prostate, large bladder tumor on the posterior wall  Complications: none  EBL: 20cc  Implants: neal catheter  Specimens: bladder tumor    Katharine Alejo MD

## 2020-07-24 NOTE — PROGRESS NOTES
Nephrology Progress Note  Sona Kiser  Date of Admission : 7/17/2020    CC: Follow up for JUAN J on CKD       Assessment and Plan     JUAN J on CKD  - 2/2 volume depletion  - U/S neg for hydronephrosis  - Cr stable  - diurese PRN  - daily labs    HFrEF:  - EF 16-20%, NYHA Class IV , hx of VF arrest - s/p AICD  - s/p LVAD placement on 11/18     CKD Stage III   - Mild Left renal atrophy at baseline   - baseline Cr around 1.2 to 1.4     BPH w/ hx of urinary retention:  - bladder scan and SC if residual > 500  - on flomax and proscar     Bladder tumor:  - cysto and resection on 7/23  - per urology    PAF s/p DCCV 6/19     Anemia:  - IV x 3 doses  - on PAVEL    Pseudomonas bacteremia:  - on meropenem   - per ID       Interval History:  Seen and examined. Cr stable today. On RA, breathing comfortably. No cp, n/v/d reported. Current Medications: all current  Medications have been eviewed in EPIC  Review of Systems: Pertinent items are noted in HPI. Objective:  Vitals:    Vitals:    07/24/20 0335 07/24/20 0520 07/24/20 0643 07/24/20 0813   BP:       Pulse: 89   88   Resp: 18   18   Temp: 97.6 °F (36.4 °C)   97.7 °F (36.5 °C)   SpO2: 100% 99%  99%   Weight:   80.8 kg (178 lb 2.1 oz)    Height:         Intake and Output:  No intake/output data recorded.   07/22 1901 - 07/24 0700  In: 1330 [P.O.:480; I.V.:850]  Out: 1750 [Urine:1750]    Physical Examination:  Pt intubated     No  General: NAD,Conversant   Neck:  Supple, no mass  Resp:  Lungs CTA B/L, no wheezing , normal respiratory effort  CV:  RRR,  + VAD sounds, no LE edema  GI:  Soft, NT, + Bowel sounds, no hepatosplenomegaly  Neurologic:  Non focal  Psych:             AAO x 3 appropriate affect   Skin:  No Rash  :  No neal    []    High complexity decision making was performed  []    Patient is at high-risk of decompensation with multiple organ involvement    Lab Data Personally Reviewed: I have reviewed all the pertinent labs, microbiology data and radiology studies during assessment. Recent Labs     07/24/20  0402 07/23/20  0325 07/22/20  0535   * 135* 137   K 4.4 4.0 4.2    104 107   CO2 24 24 22   * 87 121*   BUN 29* 26* 24*   CREA 1.62* 1.68* 1.70*   CA 8.5 8.4* 8.7   MG 2.3 2.3 2.4   PHOS  --  2.9 3.2   ALB 2.8* 2.9* 3.4*   ALT 22  --  18     Recent Labs     07/24/20  0402 07/23/20  0325 07/22/20  0535   WBC 6.9 6.4 6.2   HGB 7.7* 7.2* 7.2*   HCT 24.7* 23.7* 24.0*    201 169     No results found for: SDES  Lab Results   Component Value Date/Time    Culture result: (A) 07/22/2020 10:26 AM     PROBABLE PSEUDOMONAS SPECIES GROWING IN 2 OF 4 BOTTLES DRAWN (SITES =  AND [facility]) REFER TO [id number] FOR ID AND SENSITIVITIES    Culture result: REMAINING BOTTLE(S) HAS/HAVE NO GROWTH SO FAR 07/22/2020 10:26 AM    Culture result: LIGHT STAPHYLOCOCCUS AUREUS (A) 07/20/2020 02:20 PM     Recent Results (from the past 24 hour(s))   SAMPLES BEING HELD    Collection Time: 07/24/20  3:54 AM   Result Value Ref Range    SAMPLES BEING HELD lara     COMMENT        Add-on orders for these samples will be processed based on acceptable specimen integrity and analyte stability, which may vary by analyte.    DIGOXIN    Collection Time: 07/24/20  4:02 AM   Result Value Ref Range    Digoxin level 0.7 (L) 0.90 - 2.00 NG/ML   CBC W/O DIFF    Collection Time: 07/24/20  4:02 AM   Result Value Ref Range    WBC 6.9 4.1 - 11.1 K/uL    RBC 2.58 (L) 4.10 - 5.70 M/uL    HGB 7.7 (L) 12.1 - 17.0 g/dL    HCT 24.7 (L) 36.6 - 50.3 %    MCV 95.7 80.0 - 99.0 FL    MCH 29.8 26.0 - 34.0 PG    MCHC 31.2 30.0 - 36.5 g/dL    RDW 15.6 (H) 11.5 - 14.5 %    PLATELET 145 697 - 724 K/uL    MPV 8.9 8.9 - 12.9 FL    NRBC 0.0 0  WBC    ABSOLUTE NRBC 0.00 0.00 - 0.01 K/uL   LD    Collection Time: 07/24/20  4:02 AM   Result Value Ref Range     85 - 241 U/L   NT-PRO BNP    Collection Time: 07/24/20  4:02 AM   Result Value Ref Range    NT pro-BNP 15,225 (H) <125 PG/ML   PROCALCITONIN Collection Time: 07/24/20  4:02 AM   Result Value Ref Range    Procalcitonin 0.32 ng/mL   MAGNESIUM    Collection Time: 07/24/20  4:02 AM   Result Value Ref Range    Magnesium 2.3 1.6 - 2.4 mg/dL   METABOLIC PANEL, COMPREHENSIVE    Collection Time: 07/24/20  4:02 AM   Result Value Ref Range    Sodium 135 (L) 136 - 145 mmol/L    Potassium 4.4 3.5 - 5.1 mmol/L    Chloride 105 97 - 108 mmol/L    CO2 24 21 - 32 mmol/L    Anion gap 6 5 - 15 mmol/L    Glucose 110 (H) 65 - 100 mg/dL    BUN 29 (H) 6 - 20 MG/DL    Creatinine 1.62 (H) 0.70 - 1.30 MG/DL    BUN/Creatinine ratio 18 12 - 20      GFR est AA 51 (L) >60 ml/min/1.73m2    GFR est non-AA 42 (L) >60 ml/min/1.73m2    Calcium 8.5 8.5 - 10.1 MG/DL    Bilirubin, total 0.4 0.2 - 1.0 MG/DL    ALT (SGPT) 22 12 - 78 U/L    AST (SGOT) 14 (L) 15 - 37 U/L    Alk. phosphatase 88 45 - 117 U/L    Protein, total 7.1 6.4 - 8.2 g/dL    Albumin 2.8 (L) 3.5 - 5.0 g/dL    Globulin 4.3 (H) 2.0 - 4.0 g/dL    A-G Ratio 0.7 (L) 1.1 - 2.2                 Telma Palmer MD  [address]  Phone - [phone]   Fax - [phone]  www. [email or web address]

## 2020-07-24 NOTE — PROGRESS NOTES
Transitions of Care: 36% risk of re-admission      -Anticipate discharge home with resumption of home health through [facility], will need long-term IV antibiotics   -PT/OT consults to prevent decompensation while IP    The CM spoke with F NP, also reviewed ID notes- patient's blood cultures remain positive at this time, ultimate plan TBD. The patient will need long-term/indefinite IV antibiotics, no orders at this time. The CM will await further determination for IV antibiotic plan- will need referral to [facility] for home infusion, however, will await until plan clearly established for the IV antibiotics. PT/OT consultations would be beneficial to prevent decompensation, discussed with bedside RN. [facility] is following for home health needs. The patient is to remain IP through the weekend, CM will continue to follow.  SUSHIL Farah

## 2020-07-24 NOTE — PROGRESS NOTES
Progress Note    Patient: Alen Lopez MRN: [id number]  SSN: [id number]    YOB: 1950  Age: 71 y.o. Sex: male        ADMITTED:  7/17/2020 to Cordell Mackenzie MD  for Acute encephalopathy [G93.40]  Acute encephalopathy [G93.40]         Janaephus ARMAND Kiser is 1 Day Post-Op Procedure(s):  CYSTO RETROGRADE PYELOGRAM/  BLADDER BIOPSY/ TURBT. Patient is doing well with minimal complaints. Lizama in place draining clear erica to light pink UA. No clots noted. NOT utilizing CBI. Hgb 7.7. Per Dr. Ledy Mcnamara, all visible tumor was resected. Informed patient and RN    Per RN, tentative plan is currently waiting on blood cultures to come back before placing a central line for IV abx at home    afvss  WBC: wnl  07/19: Bcx: PSEUDOMONAS   07/22: Bcx: pending  Hgb: 7.7  Cr: 1.62  UA: 10-20 WBCs, >100 RBCs, 1+ bacteria  Ucx: negative  Urine cytology: suspicious for high-grade urothelial carcinoma  Lizama in place; UOP: 1150cc  +meropenem  +0.8mg flomax, finasteride  +venofer    Urine cytology shows suspicion for high-grade urothelial carcinoma.      07/06/2020  CT abd/pelv wwo:  Kidneys/Ureters/Bladder: No renal masses. Multifocal cortical scarring  throughout the left kidney, and small area of vertical scarring in the lower  pole of the right kidney. No renal or ureteral calculi. No hydronephrosis or  hydroureter. The bladder is partially decompressed with Lizama catheter in place. There is marked circumferential bladder wall thickening and surrounding fat  stranding with mucosal enhancement. Small amount of dependent air noted within  the bladder lumen secondary to instrumentation. No definite foci of air noted  within the bladder wall. On delayed phase images, there are no suspicious  filling defects noted within the bilateral collecting systems.     IMPRESSION:  1. Findings consistent with acute cystitis. Bladder is partially decompressed  with Lizama catheter in place.   2. No evidence of urolithiasis. No evidence of solid renal, ureteral or bladder  mass. Multifocal cortical scarring throughout the left kidney, and cortical  scarring in the lower pole of the right kidney. 3. Mild pulmonary interstitial edema and small bilateral pleural effusions. Vitals:  Temp (24hrs), Av.6 °F (36.4 °C), Min:96.2 °F (35.7 °C), Max:98.1 °F (36.7 °C)     Blood pressure 112/84, pulse 88, temperature 97.7 °F (36.5 °C), resp. rate 18, height 6' 2\" (1.88 m), weight 80.8 kg (178 lb 2.1 oz), SpO2 99 %. I&O's:   1901 -  0700  In: 1330 [P.O.:480; I.V.:850]  Out: 1750 [Urine:1750]   No intake/output data recorded. Labs:   Recent Labs     20  0402 20  0325 20  0535   WBC 6.9 6.4 6.2   HGB 7.7* 7.2* 7.2*   HCT 24.7* 23.7* 24.0*    201 169     Recent Labs     20  0402 20  0325 20  0535   * 135* 137   K 4.4 4.0 4.2    104 107   CO2 24 24 22   * 87 121*   BUN 29* 26* 24*   CREA 1.62* 1.68* 1.70*   CA 8.5 8.4* 8.7        Cultures:      Imaging:       Assessment:     - 1 Day Post-Op Procedure(s):  CYSTO RETROGRADE PYELOGRAM/  BLADDER BIOPSY/ TURBT    Active Problems:    Acute encephalopathy (2020)        Plan:     POD1 cysto retrograde pyelogram, bladder biopsy, TURBT   Urine cytology showing high-grade urothelial carcinom  Urinary Retention  Hx of BPH  Anemia  Microscopic hematuria  - maintain neal with strict I&Os  - pathology pending  - patient aware he will need strict f/u with [facility] post discharge. [id number]  - ucx negative  - transfuse per protocol  - continue flomax and finasteride       Case discussed with Dr. Kristi Etienne By: Juan Alberto Bowman NP - 2020     Addendum: I discussed operative findings with Dr. Pina Ruano. All visible tumor was resected. Pathology pending.

## 2020-07-24 NOTE — PROGRESS NOTES
Day #1 of Meropenem  Indication:  Bacteremia  Current regimen:  1 g IV every 8 hours  Abx regimen: Meropenem monotherapy  Recent Labs     20  0325 20  0535 20  0453   WBC 6.4 6.2 5.8   CREA 1.68* 1.70* 1.61*   BUN 26* 24* 21*     Est CrCl: 47.4 ml/min; UO: 0.8 ml/kg/hr  Temp (24hrs), Av.6 °F (36.4 °C), Min:96.2 °F (35.7 °C), Max:98.1 °F (36.7 °C)    Cultures:    Urine: NG (final)   Blood, paired: Pseudomonas in 2/4 bottles (R- quinolones, S- cefepime, meropenem, Zosyn)- final   Blood, paired: Pseudomonas in 2/4 bottles (prelim)   Wound, drainage: Light staph aureus (R- quinolones, erythromycin)- final   Blood, paired: GNR in 2/4 bottles (prelim)    Plan: Change to meropenem 500 mg IV every 8 hours per [facility] Protocol with respect to renal function. Pharmacy will continue to monitor patient daily and will make dosage adjustments based upon changing renal function.

## 2020-07-24 NOTE — PROGRESS NOTES
[address] [facility] Adult  Hospitalist Group                                                                                          Hospitalist Progress Note  Augustin Quesada MD  Answering service: [phone] from in house phone        Date of Service:  2020  NAME:  Cayden Lemus  :  1950  MRN:  [id number]      Admission Summary:      71year old male with past medical history heart failure, LVEF 15%, on LVAD, paroxysmal atrial fibrillation, BPH, recent admission for hematuria, presenting to [facility] with progressive and worsening confusion.  Patient seen and examined and currently tangential in thought process.  Discussed with wife Winter Damon, via phone.  Per wife, patient has been progressively confused during the past week.  Reports he has not slept for approximately 3 nights.  At times, patient is coherent but then intermittently confused. Carmenza Ocasio has been doing things out of the ordinary such as calling his children early in the morning or late at night.  Also calling his siblings multiple times.  Per wife, patient is Confucianism but usually does not speak about God to others. Guido Winchester my encounter, patient continuously spoke about the [employer] in his past.  Patient alert to name, place, situation but not year.  Denies chest pain, shortness of breath, cough, fevers, chills, sweats, urinary frequency, dysuria, abdominal pain.  Endorses hematuria     Interval history / Subjective:     Patient is status post cystoscopy. No acute complaint. Assessment & Plan:     Acute encephalopathy,  -likely metabolic from infection, back to baseline     Bacteremia  -History of LVAD driveline infection and bacteremia with Pseudomonas in the past  -Blood cultures continue to be positive, last blood culture  still with Pseudomonas  -ID following   -Antibiotic changed from Zosyn to meropenem     Chronic systolic heart failure EF 15%, s/p LVAD.    -Advanced heart failure cardiology team on board  -ECHO done EF <15%     Acute renal failure  -creatinine 1.88 on admission   -Trending down and stable creatinine    Paroxysmal atrial fibrillation   -on digoxin  -Monitor     History of hematuria and anemia  -Coumadin discontinued on previous hospitalization due to same  -Urine cytology suspicious for high-grade urothelial carcinoma  -Status post cystoscopy with finding of bladder tumor on the posterior wall, status post resection  -Allergy following    Anemia of chronic disease and Iron deficiency   -Status post transfusion of 1 unit PRBCs  - Monitor labs   - Will transfuse for hgb <7.0   - Received iron infusion   - Nephrology following     Hyponatremia  - Improved   - Monitor labs     Seizure Disorder   - Continue Keppra     BPH  -Continue Finasteride and Tamsulosin     Depression  -Continue Effexor- XR     Code status: DNR   DVT prophylaxis: SCDs    Care Plan discussed with: Patient/Family and Nurse  Anticipated Disposition: Home w/Family  Anticipated Discharge: 24 hours to 48 hours     Hospital Problems  Date Reviewed: 7/23/2020          Codes Class Noted POA    Acute encephalopathy ICD-10-CM: G93.40  ICD-9-CM: 348.30  7/17/2020 Unknown                Review of Systems:   A comprehensive review of systems was negative except for that written in the HPI. Vital Signs:    Last 24hrs VS reviewed since prior progress note. Most recent are:  Visit Vitals  /84   Pulse 88   Temp 97.7 °F (36.5 °C)   Resp 16   Ht 6' 2\" (1.88 m)   Wt 80.8 kg (178 lb 2.1 oz)   SpO2 95%   BMI 22.87 kg/m²         Intake/Output Summary (Last 24 hours) at 7/24/2020 1337  Last data filed at 7/24/2020 1153  Gross per 24 hour   Intake 2070 ml   Output 500 ml   Net 1570 ml        Physical Examination:             Constitutional:  No acute distress, cooperative, pleasant, answers questions appropriately, appears stated age    ENT:  Oral mucosa moist, oropharynx benign. Resp:  CTA bilaterally. No wheezing/rhonchi/rales.  No accessory muscle use   CV:  Regular irregular rhythm, normal rate, LVAD in place - Drive line noted to left lower abdomen     GI:  Soft, non distended, non tender. , unable to auscultate bowel sounds due to LVAD. Musculoskeletal:  No edema, warm, 2+ pulses throughout    Neurologic:  Moves all extremities with generalized weakness. AAOx2-3, CN II-XII reviewed     Psych:  Depressive mood   Skin:  Good turgor, no rashes or ulcers       Data Review:    Review and/or order of clinical lab test  Review and/or order of tests in the radiology section of CPT  Review and/or order of tests in the medicine section of CPT      Labs:     Recent Labs     07/24/20 0402 07/23/20  0325   WBC 6.9 6.4   HGB 7.7* 7.2*   HCT 24.7* 23.7*    201     Recent Labs     07/24/20 0402 07/23/20 0325 07/22/20  0535   * 135* 137   K 4.4 4.0 4.2    104 107   CO2 24 24 22   BUN 29* 26* 24*   CREA 1.62* 1.68* 1.70*   * 87 121*   CA 8.5 8.4* 8.7   MG 2.3 2.3 2.4   PHOS  --  2.9 3.2     Recent Labs     07/24/20 0402 07/23/20 0325 07/22/20  0535   ALT 22  --  18   AP 88  --  94   TBILI 0.4  --  0.9   TP 7.1  --  7.6   ALB 2.8* 2.9* 3.4*   GLOB 4.3*  --  4.2*     No results for input(s): INR, PTP, APTT, INREXT, INREXT in the last 72 hours. No results for input(s): FE, TIBC, PSAT, FERR in the last 72 hours. Lab Results   Component Value Date/Time    Folate 16.5 01/16/2020 04:57 AM      No results for input(s): PH, PCO2, PO2 in the last 72 hours. No results for input(s): CPK, CKNDX, TROIQ in the last 72 hours.     No lab exists for component: CPKMB  Lab Results   Component Value Date/Time    Cholesterol, total 90 10/26/2019 04:09 AM    HDL Cholesterol 21 10/26/2019 04:09 AM    LDL, calculated 56.2 10/26/2019 04:09 AM    Triglyceride 64 10/26/2019 04:09 AM    CHOL/HDL Ratio 4.3 10/26/2019 04:09 AM     Lab Results   Component Value Date/Time    Glucose (POC) 119 (H) 07/18/2020 09:42 PM    Glucose (POC) 124 (H) 07/18/2020 04:45 PM    Glucose (POC) 116 (H) 07/18/2020 11:33 AM    Glucose (POC) 109 (H) 07/18/2020 06:13 AM    Glucose (POC) 99 01/27/2020 11:11 AM     Lab Results   Component Value Date/Time    Color YELLOW/STRAW 07/21/2020 12:43 PM    Appearance CLOUDY (A) 07/21/2020 12:43 PM    Specific gravity 1.020 07/21/2020 12:43 PM    Specific gravity 1.015 10/31/2019 11:00 AM    pH (UA) 6.0 07/21/2020 12:43 PM    Protein 300 (A) 07/21/2020 12:43 PM    Glucose Negative 07/21/2020 12:43 PM    Ketone Negative 07/21/2020 12:43 PM    Bilirubin Negative 07/21/2020 12:43 PM    Urobilinogen 0.2 07/21/2020 12:43 PM    Nitrites Negative 07/21/2020 12:43 PM    Leukocyte Esterase MODERATE (A) 07/21/2020 12:43 PM    Epithelial cells MODERATE (A) 07/21/2020 12:43 PM    Bacteria Negative 07/21/2020 12:43 PM    WBC 10-20 07/21/2020 12:43 PM    RBC >100 (H) 07/21/2020 12:43 PM         Medications Reviewed:     Current Facility-Administered Medications   Medication Dose Route Frequency    hydrALAZINE (APRESOLINE) 20 mg/mL injection 10 mg  10 mg IntraVENous Q4H PRN    meropenem (MERREM) 500 mg in 0.9% sodium chloride (MBP/ADV) 50 mL  500 mg IntraVENous Q8H    magnesium oxide (MAG-OX) tablet 400 mg  400 mg Oral BID    epoetin yvonne-epbx (RETACRIT) injection 10,000 Units  10,000 Units SubCUTAneous Q MON, WED & FRI    0.9% sodium chloride infusion 250 mL  250 mL IntraVENous PRN    calcium polycarbophiL (FIBERCON) tablet 625 mg  1 Tab Oral DAILY    sodium chloride (NS) flush 5-40 mL  5-40 mL IntraVENous Q8H    sodium chloride (NS) flush 5-40 mL  5-40 mL IntraVENous PRN    acetaminophen (TYLENOL) tablet 650 mg  650 mg Oral Q4H PRN    diphenhydrAMINE (BENADRYL) injection 12.5 mg  12.5 mg IntraVENous Q4H PRN    ondansetron (ZOFRAN) injection 4 mg  4 mg IntraVENous Q4H PRN    bisacodyL (DULCOLAX) tablet 5 mg  5 mg Oral DAILY PRN    digoxin (LANOXIN) tablet 0.125 mg  0.125 mg Oral EVERY OTHER DAY    finasteride (PROSCAR) tablet 5 mg  5 mg Oral DAILY    lactobac ac& pc-s.therm-b.anim (TIAN Q/RISAQUAD)  1 Cap Oral DAILY    levETIRAcetam (KEPPRA) tablet 250 mg  250 mg Oral BID    tamsulosin (FLOMAX) capsule 0.8 mg  0.8 mg Oral QHS    venlafaxine-SR (EFFEXOR-XR) capsule 150 mg  150 mg Oral DAILY WITH BREAKFAST     ______________________________________________________________________  EXPECTED LENGTH OF STAY: 4d 16h  ACTUAL LENGTH OF STAY:          4                 Nuvia Talavera MD

## 2020-07-24 NOTE — OP NOTES
[address]   OPERATIVE REPORT    Name:  Shaina Weston  MR#:  [id number]  :  1950  ACCOUNT #:  [de-identified]  DATE OF SERVICE:  2020      PREOPERATIVE DIAGNOSIS:  Intermittent gross hematuria with history of positive cytology and negative CT of upper tracts. POSTOPERATIVE DIAGNOSIS:  Intermittent gross hematuria with history of positive cytology and negative CT of upper tracts with large papillary and sessile bladder tumor of posterior wall. PROCEDURE PERFORMED:  Cystoscopy, transurethral resection of large bladder tumor. SURGEON:  Abraham Segovia MD    ASSISTANT:  None     ANESTHESIA:  General.    COMPLICATIONS:  None. SPECIMENS REMOVED:  Large bladder tumor. IMPLANTS: neal catheter    ESTIMATED BLOOD LOSS:  Minimal, less than 20 mL. CLINICAL INDICATIONS:  The patient is a gentleman with a complex medical history and a history of an LVAD. He has been on anticoagulants and was having intermittent gross hematuria. He subsequently was scheduled for cystoscopy after a CT scan had not really shown any abnormalities of his bladder or upper tracts. Potential risks and complications were reviewed with him. He has had a history of positive cytology also precipitating us to do the cystoscopy. OPERATIVE PROCEDURE:  He was taken to the operating room and placed in the dorsal lithotomy position after general anesthesia was induced. He was given parenteral antibiotics preoperatively. His penis and scrotum were prepped and draped. A time-out was taken. Cystoscope was introduced and his urethra was normal.  His prostatic urethra was only mildly obstructing. His bladder had a somewhat papillary and sessile tumor involving the posterior bladder wall. There were lobular areas of this tumor, it did extend for approximately 5.5 cm. The remainder of the bladder mucosa was normal throughout. His ureteral orifices were normal and remote from this lesion.   After this was seen, I pulled the cystoscope and passed the resectoscope into the bladder. We used a bipolar resectoscope and resected all of this tumor. After it was resected, meticulous hemostasis was obtained with cautery. I did make sure that there was no injury to his ureteral orifices. After this was completed, inspection revealed no active bleeding. He had one 2-3 mm area that was slightly thin, but no obvious perforations. After this was completed, I did use an [employer] evacuator to remove all resected tumor fragments. These were sent for histology. After this was completed, I removed the resectoscope and passed a three-way 18-[place] catheter into his bladder and inflated the balloon and seated at the bladder neck. The efflux from his bladder was clear and I did irrigate this to be sure and was completely clear. This was connected to gravity drainage. I did not put him on CBI since this was clear. After this was documented, he was awakened and returned to recovery room in stable condition. He tolerated this well. There were no intraoperative complications.         MD SHAMAR Baptiste [email or web address]  D:  07/23/2020 15:11  T:  07/23/2020 22:24  JOB #:  [id number]

## 2020-07-24 NOTE — PROGRESS NOTES
[address] [place] [address]        Procedure:Cystoscopy, transurethral resection of large bladder tumor     Pre Procedure: 1264  Speed: 6000  Flow:5.1  MAP:92  PI:3.1  Power:4.8         Post [id number]   Speed:6000  Flow:5.1  MAP:96  PI:3.2  Power: 4.9     VAD Coordinator managed LVAD equipment during procedure.

## 2020-07-24 NOTE — ANESTHESIA POSTPROCEDURE EVALUATION
Post-Anesthesia Evaluation and Assessment    Patient: Viviana Gates MRN: [id number]  SSN: [id number]    YOB: 1950  Age: 71 y.o. Sex: male      I have evaluated the patient and they are stable and ready for discharge from the PACU. Cardiovascular Function/Vital Signs  Visit Vitals  /84   Pulse 89   Temp 36.4 °C (97.6 °F)   Resp 18   Ht 6' 2\" (1.88 m)   Wt 80.8 kg (178 lb 2.1 oz)   SpO2 99%   BMI 22.87 kg/m²       Patient is status post General anesthesia for Procedure(s):  CYSTO RETROGRADE PYELOGRAM/  BLADDER BIOPSY/ TURBT. Nausea/Vomiting: None    Postoperative hydration reviewed and adequate. Pain:  Pain Scale 1: Numeric (0 - 10) (07/24/20 0335)  Pain Intensity 1: 0 (07/24/20 0335)   Managed    Neurological Status:   Neuro  Neurologic State: Alert (07/23/20 2300)  Orientation Level: Oriented X4 (07/23/20 2300)  Cognition: Appropriate decision making; Appropriate for age attention/concentration; Appropriate safety awareness; Follows commands (07/23/20 2300)  Speech: Clear (07/23/20 2300)   At baseline    Mental Status, Level of Consciousness: Alert and  oriented to person, place, and time    Pulmonary Status:   O2 Device: Nasal cannula (07/24/20 0520)   Adequate oxygenation and airway patent    Complications related to anesthesia: None    Post-anesthesia assessment completed. No concerns    Signed By: Angelina Washington MD     July 24, 2020              Procedure(s):  CYSTO RETROGRADE PYELOGRAM/  BLADDER BIOPSY/ TURBT.    general    <BSHSIANPOST>    INITIAL Post-op Vital signs:   Vitals Value Taken Time   BP     Temp     Pulse 83 7/24/2020  7:09 AM   Resp     SpO2     Vitals shown include unvalidated device data.

## 2020-07-24 NOTE — PROGRESS NOTES
ID Progress Note  2020    Subjective: \"I feel better today. Food is not great here, but ate most of food. I know      Review of Systems:            Symptom Y/N Comments   Symptom Y/N Comments   Fever/Chills n      Chest Pain  n      Poor Appetite       Edema        Cough n      Abdominal Pain        Sputum       Joint Pain        SOB/DOUGLAS  n     Pruritis/Rash        Nausea/vomit n      Tolerating PT/OT        Diarrhea  n     Tolerating Diet        Constipation  n     Other           Could NOT obtain due to:       Objective:     Vitals:   Visit Vitals  /84   Pulse 88   Temp 97.7 °F (36.5 °C)   Resp 16   Ht 6' 2\" (1.88 m)   Wt 80.8 kg (178 lb 2.1 oz)   SpO2 95%   BMI 22.87 kg/m²        Tmax:  Temp (24hrs), Av.6 °F (36.4 °C), Min:96.2 °F (35.7 °C), Max:98 °F (36.7 °C)      PHYSICAL EXAM:  General: Ill appearing. Alert, cooperative, no acute distress    EENT:  EOMI. Anicteric sclerae. MMM  Resp:  Clear in apex with decreased breath sound. no wheezing or rales. No accessory muscle use                          (+) LVAD hum  CV:  Regular  rhythm,  No edema  GI:  Soft, Non distended, Non tender. +Bowel sounds, LVAD insertion site dressing dry and intact                          Lizama in place; draining pink tinged urine. Neurologic:  Alert and oriented X 3, normal speech,   Psych:   Good insight. Not anxious nor agitated  Skin:  No rashes.   No jaundice    Labs:   Lab Results   Component Value Date/Time    WBC 6.9 2020 04:02 AM    HGB 7.7 (L) 2020 04:02 AM    HCT 24.7 (L) 2020 04:02 AM    PLATELET 137  04:02 AM    MCV 95.7 2020 04:02 AM     Lab Results   Component Value Date/Time    Sodium 135 (L) 2020 04:02 AM    Potassium 4.4 2020 04:02 AM    Chloride 105 2020 04:02 AM    CO2 24 2020 04:02 AM    Anion gap 6 2020 04:02 AM    Glucose 110 (H) 2020 04:02 AM    BUN 29 (H) 2020 04:02 AM    Creatinine 1.62 (H) 2020 04:02 AM BUN/Creatinine ratio 18 07/24/2020 04:02 AM    GFR est AA 51 (L) 07/24/2020 04:02 AM    GFR est non-AA 42 (L) 07/24/2020 04:02 AM    Calcium 8.5 07/24/2020 04:02 AM    Bilirubin, total 0.4 07/24/2020 04:02 AM    Alk. phosphatase 88 07/24/2020 04:02 AM    Protein, total 7.1 07/24/2020 04:02 AM    Albumin 2.8 (L) 07/24/2020 04:02 AM    Globulin 4.3 (H) 07/24/2020 04:02 AM    A-G Ratio 0.7 (L) 07/24/2020 04:02 AM    ALT (SGPT) 22 07/24/2020 04:02 AM       Results     Procedure Component Value Units Date/Time    CULTURE, BLOOD, PAIRED [[id number]] Collected:  07/24/20 0354    Order Status:  Completed Specimen:  Blood Updated:  07/24/20 0539    CULTURE, BLOOD, PAIRED [[id number]]  (Abnormal) Collected:  07/22/20 1026    Order Status:  Completed Specimen:  Blood Updated:  07/24/20 0731     Special Requests: NO SPECIAL REQUESTS        Culture result:       PROBABLE PSEUDOMONAS SPECIES GROWING IN 2 OF 4 BOTTLES DRAWN (SITES = RW AND L AC) REFER TO [id number] FOR ID AND SENSITIVITIES                  REMAINING BOTTLE(S) HAS/HAVE NO GROWTH SO FAR          CULTURE, WOUND Laurance Alken STAIN [[id number]]  (Abnormal)  (Susceptibility) Collected:  07/20/20 1420    Order Status:  Completed Specimen:  Wound Drainage Updated:  07/23/20 1035     Special Requests: NO SPECIAL REQUESTS        GRAM STAIN NO ORGANISMS SEEN        Culture result:       LIGHT STAPHYLOCOCCUS AUREUS          Susceptibility      Staphylococcus aureus     ANA     Ciprofloxacin ($) Resistant     Clindamycin ($) Resistant     Daptomycin ($$$$$) Susceptible     Doxycycline ($$) Susceptible     Erythromycin ($$$$) Resistant     Gentamicin ($) Susceptible     Levofloxacin ($) Resistant     Linezolid ($$$$$) Susceptible     Moxifloxacin ($$$$) Resistant     Oxacillin Susceptible     Rifampin ($$$$) Susceptible [1]      Tetracycline Susceptible     Trimeth/Sulfa Susceptible     Vancomycin ($) Susceptible            [1]   Rifampin is not to be used for mono-therapy. CULTURE, BLOOD, PAIRED [[id number]]  (Abnormal) Collected:  07/19/20 0438    Order Status:  Completed Specimen:  Blood Updated:  07/24/20 1004     Special Requests: NO SPECIAL REQUESTS        Culture result:       PSEUDOMONAS SPECIES GROWING IN 2 OF 4 BOTTLES DRAWN (SITES = R ARM AND LFA) PLEASE REFER TO [id number] FOR ID AND SENSITIVITIES                   PRELIMINARY REPORT OF GRAM NEGATIVE RODS GROWING IN 2 OF 4 BOTTLES DRAWN (SITES= LFA AND R ARM) CALLED TO AND READ BACK BY DIANA HarrisBurley) ON 07/20/2020 AT 0700 BY VRAH                  REMAINING BOTTLE(S) HAS/HAVE NO GROWTH IN 5 DAYS          CULTURE, BLOOD, PAIRED [[id number]]  (Abnormal)  (Susceptibility) Collected:  07/17/20 1859    Order Status:  Completed Specimen:  Blood Updated:  07/23/20 1101     Special Requests: NO SPECIAL REQUESTS        Culture result:       PSEUDOMONAS AERUGINOSA GROWING IN 2 OF 4 BOTTLES DRAWN . ..(SITES= RAC AND LAC)                  PRELIMINARY REPORT OF GRAM NEGATIVE RODS GROWING IN 2 OF 4 BOTTLES DRAWN CALLED TO AND READ BACK BY  MS VICKI WILKES AT 1805 ON 7/18/20. PJ                  REMAINING BOTTLE(S) HAS/HAVE NO GROWTH IN 5 DAYS          Susceptibility      Pseudomonas aeruginosa     ANA     Amikacin ($) Susceptible     Cefepime ($$) Susceptible     Ceftazidime ($) Susceptible     Ciprofloxacin ($) Resistant     Gentamicin ($) Susceptible     Levofloxacin ($) Resistant     Meropenem ($$) Susceptible     Piperacillin/Tazobac ($) Susceptible [1]      Tobramycin ($) Susceptible            [1]   **FDA INTERPRETATION REFLECTED, REFER TO [employer] FOR ALTERNATE INTERPRETATIONS. **                 CULTURE, URINE [[id number]]     Order Status:  Canceled Specimen:  Urine from Clean catch     URINE CULTURE HOLD SAMPLE [[id number]] Collected:  07/17/20 1151    Order Status:  Completed Specimen:  Urine from Serum Updated:  07/17/20 1204     Urine culture hold       Urine on hold in Microbiology dept for 2 days.   If unpreserved urine is submitted, it cannot be used for addtional testing after 24 hours, recollection will be required. CULTURE, URINE [[id number]] Collected:  07/17/20 1151    Order Status:  Completed Specimen:  Urine from Clean catch Updated:  07/18/20 1155     Special Requests: NO SPECIAL REQUESTS        Culture result:       No significant growth, <10,000 CFU/mL              Assessment and Plan   LVAD drive line infection  Persistent pseudomonas bacteremia  - blood cx (7/17,19, 22): pseudomonas, gram (-) rods    Blood cx (7/24) pending. Please send another blood cx tomorrow if 7/24 cx is positive. IV Zosyn was changed to [address] [place] [address] on 7/23, wait on PICC until blood cx is negative. May need lifelong IV ABX vs re-siting the LVAD    S/p Cystoscopy, transurethral resection of large bladder tumor (7/23)  -  Urine cx (7/17):  No significant growth, <10,000 CFU/mL      Urine cytology (7/21): Suspicious for high-grade urothelial carcinoma       Urology following      AMS- pt is alert and orient x 3    Valarie Garza, NP

## 2020-07-24 NOTE — PROGRESS NOTES
[facility] in [place], [place]  Inpatient Progress Note      Patient name: Tobin Bella  Patient : 1950  Patient MRN: [id number]  Attending MD: Harmony Virgen MD  Date of service: 20    REASON FOR CONSULT:  UTI in patient with LVAD    PROCEDURE PERFORMED:  Cystoscopy, transurethral resection of large bladder tumor.   POD 1      24hr Events:  NTproBNP down to 91595 from 96836  MAPs 80-92mmHg  s/p TURBT and cystoscopy this afternoon  Multiple PI events upon VAD interrogation     PLAN:  LVAD device high speed at 6000rpm and low speed at 5600rpm  Cannot tolerate beta-blockers d/t severe RV failure  Cannot tolerate ACE/ARB/ARNi due to persistent orthostasis despite increased fluid intake   Cannot tolerate spironolactone due to IVVD and hyponatremia  Continue digoxin 0.125 every other day, level 0.8; trend levels daily  Monitor daily CardioMEMs readings, 20-22mmHg (20)     hydralazine 10mg IV prn MAP's > 95mmHg  LVEDD > 7cm; consider RHC to determine device speed  Unable to tolerate anticoagulation due to hematuria; awaiting LDH off aspirin  check hemoccult   UA (20) large blood, RBC >100, mod leukocytes  Urine cytology + high-grade urothelial carcinoma   Diagnostic cystoscopy and TURBT (20) -  resection of large papillary and solid bladder tumor on posterior wall   Nephrology consult appreciated  bladder and renal US (20) No evidence for hydronephrosis or mass lesion, small left kidney  Urology consult appreciated   D/C IV zosyn and changed to IV merrem per ID   ID consult appreciated  Blood cultures sent today (20)  Blood cultures (20) preliminary GNRs in 2/4 bottles   Blood cultures (20) preliminary Pseudomonas 2/4 bottles preliminary report GNRs 2/4bottles   Wound culture (20) preliminary light prob staph aureus  Gentamicin to Drive line exit site with daily dressing changes  Will eventually need PICC placed for long term IV ABX, waiting for negative blood cultures  Elevated sed rate- 109 (7/22/20)  Continue soren Q po BID   Fiber con 625 po daily   Nutrition consult, appreciate recs  Continue keppra,  Level 15.5 (7/18/20)  Hospitalist assistance appreciated  DNR  Daily weights, regular cardiac diet, strict I/O  Trend CBC, CMP, NTproBNP, Mag, LD, procalcitonin, digoxin  Remain in CVSU     IMPRESSION:  UTI  Metabolic encephalopathy  Chronic systolic heart failure  Stage D, NYHA class II symptoms  Coronary artery disease  Ischemic cardiomyopathy, LVEF 15%  S/p HM3 LVAD as destination therapy (11/18/19 by Dr. Isabelle Higgins)  S/p Impella 5.0 as bridge to LVAD  HTN, goal MAP 70-90mmHg  H/o VT s/p [place] BIV-ICD  PAF  H/o recurrent UTI, pseudomonal infection  Unable to tolerate AC due to hematuria  MSSA drive line infection  COPD  JOSE  Essential tremor on keppra  Depression on effexor  Persistently elevated CEA PET scan increased RML activity, not malignant per hemonc  Orthostatic hypotension  DNR  Up-to-date with PNA vaccine - per PCP/wife  2/4bottles blood cultures +pseudomonas (7/19/20)   diarrhea        Patient seen and examined. Data and note reviewed. I have discussed and agree with the plans as noted. 71year old male with a history of ICM s/p LVAD as DT who was admitted with bacteremia. His blood cultures continue to grow GNRs - IV antibiotics changed from zosyn to merrem. Urinary cytology revealed carcinoma. He was taken for cytoscopy yesterday with TURBT. Await further recommendations from urology. Thank you for allowing us to participate in your patient's care. Mounika Lang MD, [facility], [address] [place] [address]  Chief of Cardiology, [address] [place] [address], [address] [place] [address], [address] [place] [address]  Office [phone]  Fax [phone]        CARDIAC IMAGING:  TTE 4/20 shows large LVIDd, 6.84 cm, RVIDd 3.06 cm, TAPSE 1.15 cm, severely elevated CVP  TTE 7/6/20- Mild AI, LViDd 6.91cm, RVIDd 5.16cm, TAPSE 1.39cm  Echo (7/18/20)  · Contrast used: DEFINITY. · Image quality for this study was technically difficult. · Severely dilated left ventricle. Wall thickness appears thin. Severe systolic function. Estimated left ventricular ejection fraction is <15%. Visually measured ejection fraction. · Moderately dilated left atrium. · Moderately dilated right ventricle. Moderately to severely reduced systolic function. · Dilated right atrium. · Moderate mitral valve prolapse.       LVAD INTERROGATION:  Device interrogated in person, Increased Speed to 6000rpm, low speed to 5600rpm  Device function normal, normal flow, multiple PI events  LVAD   Pump Speed (RPM): 600  Pump Flow (LPM): 5.2  MAP: 90  PI (Pulsitility Index): 3.8  Power: 4.9   Test: Yes  Back Up  at Bedside & Labeled: Yes  Power Module Test: Yes  Driveline Site Care: No  Driveline Dressing: Clean, Dry, and Intact  Outpatient: No  MAP in Therapeutic Range (Outpatient): Yes  Testing  Alarms Reviewed: Yes  Back up SC speed: 6000  Back up Low Speed Limit: 5600  Emergency Equipment with Patient?: Yes  Emergency procedures reviewed?: No  Drive line site inspected?: No  Drive line intergrity inspected?: Yes  Drive line dressing changed?: No    PHYSICAL EXAM:  Visit Vitals  /84   Pulse 88   Temp 97.7 °F (36.5 °C)   Resp 18   Ht 6' 2\" (1.88 m)   Wt 178 lb 2.1 oz (80.8 kg)   SpO2 99%   BMI 22.87 kg/m²     Review of Systems   Constitutional: Positive for malaise/fatigue. Negative for chills, fever and weight loss. HENT: Positive for hearing loss. Eyes: Negative. Respiratory: Positive for shortness of breath. Improved   Cardiovascular: Positive for leg swelling. Negative for chest pain, palpitations and orthopnea. Gastrointestinal: Negative. Genitourinary: Positive for hematuria. Negative for dysuria, flank pain and urgency. Musculoskeletal: Negative. Skin: Negative. Neurological: Positive for weakness. Endo/Heme/Allergies: Bruises/bleeds easily. Psychiatric/Behavioral: Positive for depression. Physical Exam  Vitals signs and nursing note reviewed. Constitutional:       General: He is not in acute distress. Appearance: Normal appearance. HENT:      Head: Normocephalic. Mouth/Throat:      Mouth: Mucous membranes are moist.   Neck:      Vascular: No JVD. Cardiovascular:      Rate and Rhythm: Normal rate and regular rhythm. Heart sounds: Murmur present. Comments: +lvad hum  Pulmonary:      Effort: Pulmonary effort is normal.      Breath sounds: Normal breath sounds. Abdominal:      General: Bowel sounds are normal.      Palpations: Abdomen is soft. Genitourinary:     Comments: Lizama catheter draining erica urine  Musculoskeletal:      Right lower leg: Edema present. Left lower leg: Edema present. Skin:     General: Skin is warm and dry. Capillary Refill: Capillary refill takes 2 to 3 seconds. Comments: Scattered bruising on extremities   Neurological:      Mental Status: He is alert and oriented to person, place, and time. Motor: Weakness present. Psychiatric:         Attention and Perception: Attention normal.         Mood and Affect: Mood normal.         Speech: Speech normal.         Behavior: Behavior is cooperative. Thought Content:  Thought content normal.             PAST MEDICAL HISTORY:  Past Medical History:   Diagnosis Date    BPH (benign prostatic hyperplasia)     CHF (congestive heart failure) ([facility])     Degenerative disc disease, lumbar     Heart failure ([facility])     High cholesterol     Hypertension     Ischemic cardiomyopathy     LVAD (left ventricular assist device) present ([facility])     Paroxysmal atrial fibrillation ([facility] [address] 4/2/2019    Spinal stenosis        PAST SURGICAL HISTORY:  Past Surgical History:   Procedure Laterality Date    COLONOSCOPY Left 11/11/2019 COLONOSCOPY at bedside performed by [facility]ros Webb [facility] at [facility] ENDOSCOPY    HX APPENDECTOMY      HX CORONARY ARTERY BYPASS GRAFT      triple    HX HEART ASSIST DEVICE Left 10/29/2019    Impella 5.0    HX HEART ASSIST DEVICE Left 2019    HeartMate 3    HX HERNIA REPAIR      HX IMPLANTABLE CARDIOVERTER DEFIBRILLATOR      HX THROMBECTOMY Left 2019    Left brachial thrombectomy    NM CARDIOVERSION ELECTIVE ARRHYTHMIA EXTERNAL N/A 6/10/2019    EP CARDIOVERSION performed by Marbin Jorgensen MD at [place] [address], [address] CATH LAB    NM CARDIOVERSION ELECTIVE ARRHYTHMIA EXTERNAL N/A 2019    EP CARDIOVERSION performed by Cass Lyles MD at [place] [address] CATH LAB    NM INSJ/RPLCMT PERM DFB W/TRNSVNS LDS 1/DUAL CHMBR N/A 2019    INSERT ICD BIV MULTI performed by Fina Tierney MD at [place] [address], [address] CATH LAB    NM TCAT IMPL WRLS P-ART PRS SNR L-T HEMODYN MNTR N/A 2019    IMPLANT HEART FAILURE MONITORING DEVICE performed by Ashley Kaiser MD at [place] [address], [address] CATH LAB       FAMILY HISTORY:  Family History   Problem Relation Age of Onset    Lung Disease Mother     Hypertension Mother     Arthritis-osteo Mother     Heart Disease Mother     Heart Disease Father     Diabetes Father        SOCIAL HISTORY:  Social History     Socioeconomic History    Marital status:      Spouse name: Not on file    Number of children: Not on file    Years of education: Not on file    Highest education level: Not on file   Tobacco Use    Smoking status: Former Smoker     Last attempt to quit: 2010     Years since quittin.6    Smokeless tobacco: Never Used   Substance and Sexual Activity    Alcohol use: Not Currently     Comment: rarely    Drug use: Never   Other Topics Concern       LABORATORY RESULTS:     Labs Latest Ref Rng & Units 2020   WBC 4.1 - 11.1 K/uL 6.9 6.4 6.2 - - - 5.8   RBC 4.10 - 5.70 M/uL 2.58(L) 2.41(L) 2.46(L) - - - 2.23(L)   Hemoglobin 12.1 - 17.0 g/dL 7. 7(L) 7. 2(L) 7. 2(L) 7. 2(L) - - 6. 7(L)   Hematocrit 36.6 - 50.3 % 24. 7(L) 23. 7(L) 24. 0(L) 23. 6(L) - - 21. 8(L)   MCV 80.0 - 99.0 FL 95.7 98.3 97.6 - - - 97.8   Platelets 790 - 426 K/uL 191 201 169 - - - 178   Lymphocytes 12 - 49 % - 10(L) - - - - -   Monocytes 5 - 13 % - 7 - - - - -   Eosinophils 0 - 7 % - 3 - - - - -   Basophils 0 - 1 % - 1 - - - - -   Albumin 3.5 - 5.0 g/dL 2. 8(L) 2. 9(L) 3.4(L) - 3. 2(L) 3. 2(L) 3. 1(L)   Calcium 8.5 - 10.1 MG/DL 8.5 8.4(L) 8.7 - 8.5 - -   Glucose 65 - 100 mg/dL 110(H) 87 121(H) - 95 - -   BUN 6 - 20 MG/DL 29(H) 26(H) 24(H) - 21(H) - -   Creatinine 0.70 - 1.30 MG/DL 1.62(H) 1.68(H) 1.70(H) - 1.61(H) - -   Sodium 136 - 145 mmol/L 135(L) 135(L) 137 - 136 - -   Potassium 3.5 - 5.1 mmol/L 4.4 4.0 4.2 - 4.4 - -   TSH 0.36 - 3.74 uIU/mL - - - - - - -   PSA 0.01 - 4.0 ng/mL - - - - - - -   LDH 85 - 241 U/L 171 192 212 - - - 193   Some recent data might be hidden     Lab Results   Component Value Date/Time    TSH 1.13 07/18/2020 11:28 AM    TSH 1.70 04/21/2020 01:59 PM    TSH 2.30 12/03/2019 03:29 AM    TSH 2.40 10/25/2019 07:39 PM    TSH 2.45 06/01/2019 04:16 AM       ALLERGY:  Allergies   Allergen Reactions    Cefepime Other (comments)     myoclonus        CURRENT MEDICATIONS:    Current Facility-Administered Medications:     hydrALAZINE (APRESOLINE) 20 mg/mL injection 10 mg, 10 mg, IntraVENous, Q4H PRN, Annel Rho E, NP, 10 mg at 07/23/20 2316    meropenem (MERREM) 500 mg in 0.9% sodium chloride (MBP/ADV) 50 mL, 500 mg, IntraVENous, Q8H, Mehul Olivares MD, Last Rate: 100 mL/hr at 07/24/20 0340, 500 mg at 07/24/20 0340    magnesium oxide (MAG-OX) tablet 400 mg, 400 mg, Oral, BID, Annel Patñio E, NP, 400 mg at 07/24/20 6083    epoetin yvonne-epbx (RETACRIT) injection 10,000 Units, 10,000 Units, SubCUTAneous, Q MON, WED & Xena Goddard MD, 10,000 Units at 07/22/20 2104    0.9% sodium chloride infusion 250 mL, 250 mL, IntraVENous, PRN, Monique Sims, NP    calcium polycarbophiL (FIBERCON) tablet 625 mg, 1 Tab, Oral, DAILY, Nya Singh, NP, 625 mg at 07/24/20 5429    sodium chloride (NS) flush 5-40 mL, 5-40 mL, IntraVENous, Q8H, Zhao, [employer], DO, 10 mL at 07/24/20 0644    sodium chloride (NS) flush 5-40 mL, 5-40 mL, IntraVENous, PRN, Navdeep Ferrer, [employer] M, DO    acetaminophen (TYLENOL) tablet 650 mg, 650 mg, Oral, Q4H PRN, Thomas Oropeza M, DO, 650 mg at 07/23/20 1241    diphenhydrAMINE (BENADRYL) injection 12.5 mg, 12.5 mg, IntraVENous, Q4H PRN, Navdeep Fererr, [employer] M, DO    [facility]etron [facility]) injection 4 mg, 4 mg, IntraVENous, Q4H PRN, Thomas FRENCH, DO, 4 mg at 07/22/20 6292    bisacodyL (DULCOLAX) tablet 5 mg, 5 mg, Oral, DAILY PRN, Thomas FRENCH, DO    digoxin (LANOXIN) tablet 0.125 mg, 0.125 mg, Oral, EVERY OTHER DAY, Hyun Zhao M, DO, 0.125 mg at 07/24/20 1022    finasteride (PROSCAR) tablet 5 mg, 5 mg, Oral, DAILY, Hyun Zhao M, DO, 5 mg at 07/24/20 1022    lactobac ac& pc-s.therm-b.anim (TIAN Q/RISAQUAD), 1 Cap, Oral, DAILY, Madhuri Zhao, DO, 1 Cap at 07/24/20 8191    levETIRAcetam (KEPPRA) tablet 250 mg, 250 mg, Oral, BID, Thomas FRENCH, DO, 250 mg at 07/24/20 1814    tamsulosin (FLOMAX) capsule 0.8 mg, 0.8 mg, Oral, QHS, Hyun Zhao M, DO, 0.8 mg at 07/23/20 2148    venlafaxine-SR (EFFEXOR-XR) capsule 150 mg, 150 mg, Oral, DAILY WITH BREAKFAST, Hyun Zhao, , 150 mg at 07/24/20 8115    Thank you for allowing me to participate in this patient's care.     Angelo Robles NP  [address] [place] [address], [address]  Phone: [phone]  Fax: [phone]

## 2020-07-24 NOTE — PROGRESS NOTES
NUTRITION  Recommendations:   1. Continue current diet - family may bring foods from home  2. Daily weights       Diet: cardiac, high fiber + snack  Meal intake:   Patient Vitals for the past 168 hrs:   % Diet Eaten   07/23/20 1757 100 %   07/23/20 1147 0 % - NPO   07/23/20 0743 0 % - NPO   07/22/20 1518 0 % - NPO   07/22/20 0800 0 % - NPO   07/21/20 1502 100 %   07/21/20 0830 50 %   07/20/20 1326 10 %   07/20/20 0935 95 %   07/19/20 1740 50 %   07/19/20 1218 75 %   07/19/20 0827 50 %   07/18/20 2012 0 %   07/18/20 1243 10 %   07/18/20 0917 100 %     Pt visited for PO check. S/p TURBT (7/23/20) for resection of  bladder tumor. Doing well today. He reports good appetite with 100% of past couple of meals. Diet liberalized for more options since often a barrier. Doing well with yogurt as snacks so will continue. Son planning on bringing in dinner for him tonight. Pt with no questions/concerns at this time. Happy to see PO improved. See full RD assessment from 7/21 for additional details, goals, and monitoring/evaluation.    Estimated Nutrition Needs:   Energy: 4599-0878(MSJ x 1.3-1.4)  Using Body Wt: Current(81.3kg) Based on: [place]   Protein: 98-114g (1.201.4g/kg)  Using Body Wt: Current(81.3kg)   Fluid: 2140 - 1ml/kcal or per renal/cardio     Nicolette Chuy, RD [address] , Pager #[phone] or via [employer]

## 2020-07-24 NOTE — PROGRESS NOTES
1930: Bedside shift change report received by Carla (offgoing nurse). Report included the following information SBAR, Kardex, Procedure Summary, Intake/Output, MAR, Recent Results, and Cardiac Rhythm paced . 2330: Bedside shift change report given to Rodriguez Copeland (oncoming nurse). Report included the following information SBAR, Kardex, Procedure Summary, Intake/Output, MAR, Recent Results and Cardiac Rhythm paced. ------------------  Care Plan [id number]  Problem: Falls - Risk of  Goal: *Absence of Falls  Description: Document Rosio Fall Risk and appropriate interventions in the flowsheet. Outcome: Progressing Towards Goal  Note: Fall Risk Interventions:  Mobility Interventions: Bed/chair exit alarm, OT consult for ADLs, Patient to call before getting OOB, PT Consult for mobility concerns         Medication Interventions: Evaluate medications/consider consulting pharmacy, Patient to call before getting OOB, Teach patient to arise slowly    Elimination Interventions: Call light in reach, Patient to call for help with toileting needs, Stay With Me (per policy), Toileting schedule/hourly rounds              Problem: Pressure Injury - Risk of  Goal: *Prevention of pressure injury  Description: Document Mich Scale and appropriate interventions in the flowsheet. Outcome: Progressing Towards Goal  Note: Pressure Injury Interventions: Activity Interventions: Increase time out of bed, Pressure redistribution bed/mattress(bed type), PT/OT evaluation    Mobility Interventions: HOB 30 degrees or less, Pressure redistribution bed/mattress (bed type), PT/OT evaluation, Turn and reposition approx.  every two hours(pillow and wedges)    Nutrition Interventions: Document food/fluid/supplement intake    Friction and Shear Interventions: HOB 30 degrees or less, Lift sheet

## 2020-07-25 NOTE — PROGRESS NOTES
Day #3 of meropenem Indication:  LVAD drive line infection Current regimen:  meropenem 500mg IV q8h Recent Labs  
  20 
0310 20 
0402 20 
0325 WBC 7.4 6.9 6.4 CREA 1.62* 1.62* 1.68* BUN 30* 29* 26* Temp (24hrs), Av °F (36.7 °C), Min:97.4 °F (36.3 °C), Max:98.4 °F (36.9 °C) Est CrCl: 50 ml/min Plan: Change to meropenem 500mg IV q6h now that renal function remains somewhat improved. Antony Jones, [address]

## 2020-07-25 NOTE — PROGRESS NOTES
[address] [facility] [address] in [place], [place] Inpatient Progress Note Patient name: Walter Bajwa Patient : 1950 Patient MRN: [id number] Attending MD: Yenni Amaro MD 
Date of service: 20 REASON FOR CONSULT: 
UTI in patient with LVAD PROCEDURE PERFORMED:  Cystoscopy, transurethral resection of large bladder tumor.  
POD 2  
 
24hr Events: NTproBNP down to 9716 MAPs 84-90 mmHg Multiple PI events upon VAD interrogation PLAN: 
LVAD device high speed at 6000rpm and low speed at 5600rpm 
Cannot tolerate beta-blockers d/t severe RV failure Cannot tolerate ACE/ARB/ARNi due to persistent orthostasis despite increased fluid intake  
Cannot tolerate spironolactone due to IVVD and hyponatremia Continue digoxin 0.125 every other day, level 0.8; trend levels daily Monitor daily CardioMEMs readings, 20-22mmHg (20) hydralazine 10mg IV prn MAP's > 95mmHg LVEDD > 7cm; consider RHC to determine device speed Unable to tolerate anticoagulation due to hematuria; stable LDH off aspirin UA (20) large blood, RBC >100, mod leukocytes Urine cytology + high-grade urothelial carcinoma Diagnostic cystoscopy and TURBT (20) -  resection of large papillary and solid bladder tumor on posterior wall Nephrology consult appreciated 
bladder and renal US (20) No evidence for hydronephrosis or mass lesion, small left kidney Urology consult appreciated D/C IV zosyn and changed to IV merrem per ID  
ID consult appreciated Blood cultures (20) preliminary GNRs in 2/4 bottles Blood cultures (20) probable Pseudomonas 2/4 bottles Blood cultures (20) Pseudomonas 2/4 bottles Blood cultures (20) Pseudomonas 2/4 bottles Wound culture (20) light staph aureus Gentamicin to Drive line exit site with daily dressing changes Will eventually need PICC placed for long term IV ABX, waiting for negative blood cultures Elevated sed rate- 109 (7/22/20) Continue soren Q po BID Fiber con 625 po daily Nutrition consult, appreciate recs Continue keppra,  Level 15.5 (7/18/20) Hospitalist assistance appreciated DNR Daily weights, regular cardiac diet, strict I/O Trend CBC, CMP, NTproBNP, Mag, LD, procalcitonin, digoxin Remain in CVSU 
  
IMPRESSION: 
Bladder Carcinoma UTI Metabolic encephalopathy Chronic systolic heart failure Stage D, NYHA class II symptoms Coronary artery disease Ischemic cardiomyopathy, LVEF 15% S/p HM3 LVAD as destination therapy (11/18/19 by Dr. Basim Verma) S/p Impella 5.0 as bridge to LVAD 
HTN, goal MAP 70-90mmHg H/o VT s/p [facility] BIV-ICD PAF 
H/o recurrent UTI, pseudomonal infection Unable to tolerate AC due to hematuria MSSA drive line infection COPD 
JOSE Essential tremor on keppra Depression on effexor Persistently elevated CEA PET scan increased RML activity, not malignant per hemonc Orthostatic hypotension DNR Up-to-date with PNA vaccine - per PCP/wife 
2/4bottles blood cultures +pseudomonas (7/24/20) diarrhea Thank you for allowing us to participate in your patient's care. Mounika Dejesus MD, Nell Sever Chief of Cardiology, [facility] Medical Director Calos Rueda [address] [place] [address], [address] Office [phone] Fax [phone] CARDIAC IMAGING: 
TTE 4/20 shows large LVIDd, 6.84 cm, RVIDd 3.06 cm, TAPSE 1.15 cm, severely elevated CVP 
TTE 7/6/20- Mild AI, LViDd 6.91cm, RVIDd 5.16cm, TAPSE 1.39cm Echo (7/18/20) · Contrast used: DEFINITY. · Image quality for this study was technically difficult. · Severely dilated left ventricle. Wall thickness appears thin. Severe systolic function. Estimated left ventricular ejection fraction is <15%. Visually measured ejection fraction. · Moderately dilated left atrium. · Moderately dilated right ventricle.  Moderately to severely reduced systolic function. · Dilated right atrium. · Moderate mitral valve prolapse. 
  
 
LVAD INTERROGATION: 
Device interrogated in person, Increased Speed to 6000rpm, low speed to 5600rpm 
Device function normal, normal flow, multiple PI events LVAD Pump Speed (RPM): 6000 Pump Flow (LPM): 5.1 MAP: 80 
PI (Pulsitility Index): 3.5 Power: 4.8  Test: Yes 
Back Up  at Bedside & Labeled: Yes Power Module Test: No 
Driveline Site Care: No 
Driveline Dressing: Clean, Dry, and Intact Outpatient: No 
MAP in Therapeutic Range (Outpatient): Yes Testing Alarms Reviewed: No 
Back up SC speed: 600 Back up Low Speed Limit: 5600 Emergency Equipment with Patient?: Yes Emergency procedures reviewed?: Yes Drive line site inspected?: Yes Drive line intergrity inspected?: Yes Drive line dressing changed?: No 
 
PHYSICAL EXAM: 
Visit Vitals /84 Pulse 98 Temp 98.4 °F (36.9 °C) Resp 16 Ht 6' 2\" (1.88 m) Wt 182 lb 15.7 oz (83 kg) SpO2 95% BMI 23.49 kg/m² Review of Systems Constitutional: Positive for malaise/fatigue. Negative for chills, fever and weight loss. HENT: Positive for hearing loss. Eyes: Negative. Respiratory: Positive for shortness of breath. Improved Cardiovascular: Positive for leg swelling. Negative for chest pain, palpitations and orthopnea. Gastrointestinal: Negative. Genitourinary: Positive for hematuria. Negative for dysuria, flank pain and urgency. Musculoskeletal: Negative. Skin: Negative. Neurological: Positive for weakness. Endo/Heme/Allergies: Bruises/bleeds easily. Psychiatric/Behavioral: Positive for depression. Physical Exam 
Vitals signs and nursing note reviewed. Constitutional:   
   General: He is not in acute distress. Appearance: Normal appearance. HENT:  
   Head: Normocephalic. Mouth/Throat:  
   Mouth: Mucous membranes are moist.  
Neck:  
   Vascular: No JVD. Cardiovascular: Rate and Rhythm: Normal rate and regular rhythm. Heart sounds: Murmur present. Comments: +lvad hum Pulmonary:  
   Effort: Pulmonary effort is normal.  
   Breath sounds: Normal breath sounds. Abdominal:  
   General: Bowel sounds are normal.  
   Palpations: Abdomen is soft. Genitourinary: 
   Comments: Lizama catheter draining erica urine Musculoskeletal:  
   Right lower leg: Edema present. Left lower leg: Edema present. Skin: 
   General: Skin is warm and dry. Capillary Refill: Capillary refill takes 2 to 3 seconds. Comments: Scattered bruising on extremities Neurological:  
   Mental Status: He is alert and oriented to person, place, and time. Motor: Weakness present. Psychiatric:     
   Attention and Perception: Attention normal.     
   Mood and Affect: Mood normal.     
   Speech: Speech normal.     
   Behavior: Behavior is cooperative. Thought Content: Thought content normal.  
 
 
 
 
 
PAST MEDICAL HISTORY: 
Past Medical History:  
Diagnosis Date  BPH (benign prostatic hyperplasia)  CHF (congestive heart failure) ([address]  Degenerative disc disease, lumbar  Heart failure ([address]  High cholesterol  Hypertension  Ischemic cardiomyopathy  LVAD (left ventricular assist device) present ([address]  Paroxysmal atrial fibrillation ([address] 4/2/2019  Spinal stenosis PAST SURGICAL HISTORY: 
Past Surgical History:  
Procedure Laterality Date  COLONOSCOPY Left 11/11/2019 COLONOSCOPY at bedside performed by Pradip Amado MD at [facility]  HX CORONARY ARTERY BYPASS GRAFT    
 triple  HX HEART ASSIST DEVICE Left 10/29/2019 Impella 5.0  
 HX HEART ASSIST DEVICE Left 11/18/2019 HeartMate 3  
 HX HERNIA REPAIR    
 HX IMPLANTABLE CARDIOVERTER DEFIBRILLATOR  HX THROMBECTOMY Left 11/25/2019 Left brachial thrombectomy  SD CARDIOVERSION ELECTIVE ARRHYTHMIA EXTERNAL N/A 6/10/2019 EP CARDIOVERSION performed by Shreyas Harrison MD at [address] CATH LAB  NE CARDIOVERSION ELECTIVE ARRHYTHMIA EXTERNAL N/A 2019 EP CARDIOVERSION performed by Majo Wetzel MD at [address] [address] CATH LAB  NE INSJ/RPLCMT PERM DFB W/TRNSVNS LDS 1/DUAL CHMBR N/A 2019 INSERT ICD BIV MULTI performed by Hernan Stallworth MD at [address] [address] CATH LAB  NE TCAT IMPL WRLS P-ART PRS SNR L-T HEMODYN MNTR N/A 2019 IMPLANT HEART FAILURE MONITORING DEVICE performed by Jaimee Lazcano MD at [address]  CATH LAB FAMILY HISTORY: 
Family History Problem Relation Age of Onset  Lung Disease Mother  Hypertension Mother Kelly Downy Arthritis-osteo Mother  Heart Disease Mother  Heart Disease Father  Diabetes Father SOCIAL HISTORY: 
Social History Socioeconomic History  Marital status:  Spouse name: Not on file  Number of children: Not on file  Years of education: Not on file  Highest education level: Not on file Tobacco Use  Smoking status: Former Smoker Last attempt to quit: 2010 Years since quittin.6  Smokeless tobacco: Never Used Substance and Sexual Activity  Alcohol use: Not Currently Comment: rarely  Drug use: Never Other Topics Concern LABORATORY RESULTS: 
  
Labs Latest Ref Rng & Units 2020 WBC 4.1 - 11.1 K/uL 7.4 6.9 6.4 6.2 - - -  
RBC 4.10 - 5.70 M/uL 2.47(L) 2.58(L) 2.41(L) 2.46(L) - - - Hemoglobin 12.1 - 17.0 g/dL 7. 3(L) 7. 7(L) 7. 2(L) 7. 2(L) 7. 2(L) - - Hematocrit 36.6 - 50.3 % 24. 3(L) 24. 7(L) 23. 7(L) 24. 0(L) 23. 6(L) - - MCV 80.0 - 99.0 FL 98.4 95.7 98.3 97.6 - - - Platelets 746 - 026 K/uL 202 191 201 169 - - - Lymphocytes 12 - 49 % 11(L) - 10(L) - - - - Monocytes 5 - 13 % 6 - 7 - - - - Eosinophils 0 - 7 % 2 - 3 - - - - Basophils 0 - 1 % 1 - 1 - - - -  
 Albumin 3.5 - 5.0 g/dL 2. 8(L) 2. 8(L) 2. 9(L) 3.4(L) - 3. 2(L) 3. 2(L) Calcium 8.5 - 10.1 MG/DL 8.6 8.5 8.4(L) 8.7 - 8.5 - Glucose 65 - 100 mg/dL 95 110(H) 87 121(H) - 95 -  
BUN 6 - 20 MG/DL 30(H) 29(H) 26(H) 24(H) - 21(H) -  
Creatinine 0.70 - 1.30 MG/DL 1.62(H) 1.62(H) 1.68(H) 1.70(H) - 1.61(H) - Sodium 136 - 145 mmol/L 137 135(L) 135(L) 137 - 136 - Potassium 3.5 - 5.1 mmol/L 3.9 4.4 4.0 4.2 - 4.4 -  
TSH 0.36 - 3.74 uIU/mL - - - - - - -  
PSA 0.01 - 4.0 ng/mL - - - - - - -  
LDH 85 - 241 U/L 204 171 192 212 - - - Some recent data might be hidden Lab Results Component Value Date/Time TSH 1.13 07/18/2020 11:28 AM  
 TSH 1.70 04/21/2020 01:59 PM  
 TSH 2.30 12/03/2019 03:29 AM  
 TSH 2.40 10/25/2019 07:39 PM  
 TSH 2.45 06/01/2019 04:16 AM  
 
 
ALLERGY: 
Allergies Allergen Reactions  Cefepime Other (comments)  
  myoclonus CURRENT MEDICATIONS: 
 
Current Facility-Administered Medications:  
  hydrALAZINE (APRESOLINE) 20 mg/mL injection 10 mg, 10 mg, IntraVENous, Q4H PRN, Candiss Prior, NP, 10 mg at 07/23/20 2316   meropenem (MERREM) 500 mg in 0.9% sodium chloride (MBP/ADV) 50 mL, 500 mg, IntraVENous, Q8H, Juan C Olivares MD, Last Rate: 100 mL/hr at 07/25/20 0629, 500 mg at 07/25/20 0629 
  magnesium oxide (MAG-OX) tablet 400 mg, 400 mg, Oral, BID, Candiss Prior, NP, 400 mg at 07/25/20 5223   epoetin yvonne-epbx (RETACRIT) injection 10,000 Units, 10,000 Units, SubCUTAneous, Q MON, WED & FRI, April MD Heriberto, 10,000 Units at 07/24/20 2233 
  0.9% sodium chloride infusion 250 mL, 250 mL, IntraVENous, PRN, Shana Sims, NP 
  calcium polycarbophiL (FIBERCON) tablet 625 mg, 1 Tab, Oral, DAILY, Julius Prior, TIERRA, 625 mg at 07/25/20 7050   sodium chloride (NS) flush 5-40 mL, 5-40 mL, IntraVENous, Q8H, Madhuri Zhao DO, 10 mL at 07/25/20 6864   sodium chloride (NS) flush 5-40 mL, 5-40 mL, IntraVENous, PRN, Madhuri Davis,  
   acetaminophen (TYLENOL) tablet 650 mg, 650 mg, Oral, Q4H PRN, Roma FRENCH, DO, 650 mg at 07/23/20 1241   diphenhydrAMINE (BENADRYL) injection 12.5 mg, 12.5 mg, IntraVENous, Q4H PRN, Rigo Robledo, [employer] M, DO 
  ondansetron [facility]) injection 4 mg, 4 mg, IntraVENous, Q4H PRN, Roma FRENCH, DO, 4 mg at 07/22/20 6531   bisacodyL (DULCOLAX) tablet 5 mg, 5 mg, Oral, DAILY PRN, Rigo Robledo [employer] M, DO 
  digoxin (LANOXIN) tablet 0.125 mg, 0.125 mg, Oral, EVERY OTHER DAY, Roma FRENCH, DO, 0.125 mg at 07/24/20 1022 
  finasteride (PROSCAR) tablet 5 mg, 5 mg, Oral, DAILY, Hyun Zhao, DO, 5 mg at 07/25/20 8076   lactobac ac& pc-s.therm-b.anim (TINA Q/RISAQUAD), 1 Cap, Oral, DAILY, Hardik Goyal, DO, 1 Cap at 07/25/20 6507   levETIRAcetam (KEPPRA) tablet 250 mg, 250 mg, Oral, BID, Roma FRENCH, DO, 250 mg at 07/25/20 2849   tamsulosin (FLOMAX) capsule 0.8 mg, 0.8 mg, Oral, QHS, Hyun Zhao, DO, 0.8 mg at 07/24/20 2233 
  venlafaxine-SR (EFFEXOR-XR) capsule 150 mg, 150 mg, Oral, DAILY WITH BREAKFAST, Hyun Zhao, DO, 150 mg at 07/25/20 302 Thank you for allowing me to participate in this patient's care. Celia Bella MD 
[address] [place], [place] [address]gabe Hernandez [address] [place] [address], [address] Phone: [phone] Fax: [phone]

## 2020-07-25 NOTE — PROGRESS NOTES
[facility] [facility]  Hospitalist Group Hospitalist Progress Note Murray Calvillo MD 
Answering service: [phone] OR [phone] from in house phone Date of Service:  2020 NAME:  Sancho De Luna :  1950 MRN:  [id number] Admission Summary:  
  
71year old male with past medical history heart failure, LVEF 15%, on LVAD, paroxysmal atrial fibrillation, BPH, recent admission for hematuria, presenting to [facility] with progressive and worsening confusion.  Patient seen and examined and currently tangential in thought process.  Discussed with wife Laila Jerry, via phone.  Per wife, patient has been progressively confused during the past week.  Reports he has not slept for approximately 3 nights.  At times, patient is coherent but then intermittently confused. [facility] has been doing things out of the ordinary such as calling his children early in the morning or late at night.  Also calling his siblings multiple times.  Per wife, patient is Taoism but usually does not speak about God to others. [place] my encounter, patient continuously spoke about the [employer] in his past.  Patient alert to name, place, situation but not year.  Denies chest pain, shortness of breath, cough, fevers, chills, sweats, urinary frequency, dysuria, abdominal pain.  Endorses hematuria 
  
Interval history / Subjective:  
 
Patient is status post cystoscopy. No acute complaint. Assessment & Plan:  
 
Acute encephalopathy, 
-likely metabolic encephalopathy from infection, back to baseline Bacteremia 
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 
-Blood cultures continue to be positive, last blood culture  with gram-negative rods -ID following  
-Antibiotic changed from Zosyn to meropenem  Chronic systolic heart failure EF 15%, s/p LVAD. -Advanced heart failure cardiology team on board -ECHO done EF <15% Acute renal failure 
-creatinine 1.88 on admission  
-Trending down and stable creatinine Paroxysmal atrial fibrillation  
-on digoxin 
-Monitor History of hematuria and anemia 
-Coumadin discontinued on previous hospitalization due to same 
-Urine cytology suspicious for high-grade urothelial carcinoma 
-Status post cystoscopy with finding of bladder tumor on the posterior wall, status post resection 
-Allergy following Anemia of chronic disease and Iron deficiency  
-Status post transfusion of 1 unit PRBCs - Monitor labs - Will transfuse for hgb <7.0  
- Received iron infusion - Nephrology following Hyponatremia - Improved - Monitor labs Seizure Disorder - Continue Keppra BPH 
-Continue Finasteride and Tamsulosin Depression 
-Continue Effexor- XR Code status: DNR  
DVT prophylaxis: SCDs Care Plan discussed with: Patient/Family and Nurse Anticipated Disposition: Home w/Family Anticipated Discharge: 24 hours to 48 hours Hospital Problems  Date Reviewed: 7/23/2020 Codes Class Noted POA Acute encephalopathy ICD-10-CM: G93.40 ICD-9-CM: 348.30  7/17/2020 Unknown Review of Systems: A comprehensive review of systems was negative except for that written in the HPI. Vital Signs:  
 Last 24hrs VS reviewed since prior progress note. Most recent are: 
Visit Vitals /84 Pulse 98 Temp 98.4 °F (36.9 °C) Resp 16 Ht 6' 2\" (1.88 m) Wt 83 kg (182 lb 15.7 oz) SpO2 95% BMI 23.49 kg/m² Intake/Output Summary (Last 24 hours) at 7/25/2020 1105 Last data filed at 7/25/2020 0900 Gross per 24 hour Intake 1040 ml Output 3450 ml Net -2410 ml Physical Examination:  
 
 
     
Constitutional:  No acute distress, cooperative, pleasant, answers questions appropriately, appears stated age ENT:  Oral mucosa moist, oropharynx benign. Resp: CTA bilaterally. No wheezing/rhonchi/rales. No accessory muscle use CV:  Regular irregular rhythm, normal rate, LVAD in place - Drive line noted to left lower abdomen GI:  Soft, non distended, non tender. , unable to auscultate bowel sounds due to LVAD. Musculoskeletal:  No edema, warm, 2+ pulses throughout Neurologic:  Moves all extremities with generalized weakness. AAOx2-3, CN II-XII reviewed Psych:  Depressive mood Skin:  Good turgor, no rashes or ulcers Data Review:  
 Review and/or order of clinical lab test 
Review and/or order of tests in the radiology section of CPT Review and/or order of tests in the medicine section of CPT Labs:  
 
Recent Labs  
  07/25/20 0310 07/24/20 0402 WBC 7.4 6.9 HGB 7.3* 7.7* HCT 24.3* 24.7*  
 191 Recent Labs  
  07/25/20 0310 07/24/20 0402 07/23/20 
0325  135* 135* K 3.9 4.4 4.0  
 105 104 CO2 25 24 24 BUN 30* 29* 26* CREA 1.62* 1.62* 1.68* GLU 95 110* 87  
CA 8.6 8.5 8.4* MG 2.3 2.3 2.3 PHOS  --   --  2.9 Recent Labs  
  07/25/20 0310 07/24/20 0402 07/23/20 
0325 ALT 25 22  --   
AP 93 88  --   
TBILI 0.3 0.4  --   
TP 7.0 7.1  --   
ALB 2.8* 2.8* 2.9*  
GLOB 4.2* 4.3*  -- No results for input(s): INR, PTP, APTT, INREXT, INREXT in the last 72 hours. No results for input(s): FE, TIBC, PSAT, FERR in the last 72 hours. Lab Results Component Value Date/Time Folate 16.5 01/16/2020 04:57 AM  
  
No results for input(s): PH, PCO2, PO2 in the last 72 hours. No results for input(s): CPK, CKNDX, TROIQ in the last 72 hours. No lab exists for component: CPKMB Lab Results Component Value Date/Time Cholesterol, total 90 10/26/2019 04:09 AM  
 HDL Cholesterol 21 10/26/2019 04:09 AM  
 LDL, calculated 56.2 10/26/2019 04:09 AM  
 Triglyceride 64 10/26/2019 04:09 AM  
 CHOL/HDL Ratio 4.3 10/26/2019 04:09 AM  
 
Lab Results Component Value Date/Time Glucose (POC) 119 (H) 07/18/2020 09:42 PM  
 Glucose (POC) 124 (H) 07/18/2020 04:45 PM  
 Glucose (POC) 116 (H) 07/18/2020 11:33 AM  
 Glucose (POC) 109 (H) 07/18/2020 06:13 AM  
 Glucose (POC) 99 01/27/2020 11:11 AM  
 
Lab Results Component Value Date/Time Color YELLOW/STRAW 07/21/2020 12:43 PM  
 Appearance CLOUDY (A) 07/21/2020 12:43 PM  
 Specific gravity 1.020 07/21/2020 12:43 PM  
 Specific gravity 1.015 10/31/2019 11:00 AM  
 pH (UA) 6.0 07/21/2020 12:43 PM  
 Protein 300 (A) 07/21/2020 12:43 PM  
 Glucose Negative 07/21/2020 12:43 PM  
 Ketone Negative 07/21/2020 12:43 PM  
 Bilirubin Negative 07/21/2020 12:43 PM  
 Urobilinogen 0.2 07/21/2020 12:43 PM  
 Nitrites Negative 07/21/2020 12:43 PM  
 Leukocyte Esterase MODERATE (A) 07/21/2020 12:43 PM  
 Epithelial cells MODERATE (A) 07/21/2020 12:43 PM  
 Bacteria Negative 07/21/2020 12:43 PM  
 WBC 10-20 07/21/2020 12:43 PM  
 RBC >100 (H) 07/21/2020 12:43 PM  
 
 
 
Medications Reviewed:  
 
Current Facility-Administered Medications Medication Dose Route Frequency  hydrALAZINE (APRESOLINE) 20 mg/mL injection 10 mg  10 mg IntraVENous Q4H PRN  
 meropenem (MERREM) 500 mg in 0.9% sodium chloride (MBP/ADV) 50 mL  500 mg IntraVENous Q8H  
 magnesium oxide (MAG-OX) tablet 400 mg  400 mg Oral BID  epoetin yvonne-epbx (RETACRIT) injection 10,000 Units  10,000 Units SubCUTAneous Q MON, WED & FRI  
 0.9% sodium chloride infusion 250 mL  250 mL IntraVENous PRN  
 calcium polycarbophiL (FIBERCON) tablet 625 mg  1 Tab Oral DAILY  sodium chloride (NS) flush 5-40 mL  5-40 mL IntraVENous Q8H  
 sodium chloride (NS) flush 5-40 mL  5-40 mL IntraVENous PRN  
 acetaminophen (TYLENOL) tablet 650 mg  650 mg Oral Q4H PRN  
 diphenhydrAMINE (BENADRYL) injection 12.5 mg  12.5 mg IntraVENous Q4H PRN  
 ondansetron (ZOFRAN) injection 4 mg  4 mg IntraVENous Q4H PRN  
 bisacodyL (DULCOLAX) tablet 5 mg  5 mg Oral DAILY PRN  
  digoxin (LANOXIN) tablet 0.125 mg  0.125 mg Oral EVERY OTHER DAY  finasteride (PROSCAR) tablet 5 mg  5 mg Oral DAILY  lactobac ac& pc-s.therm-b.anim (TIAN Q/RISAQUAD)  1 Cap Oral DAILY  levETIRAcetam (KEPPRA) tablet 250 mg  250 mg Oral BID  tamsulosin (FLOMAX) capsule 0.8 mg  0.8 mg Oral QHS  venlafaxine-SR (EFFEXOR-XR) capsule 150 mg  150 mg Oral DAILY WITH BREAKFAST  
 
______________________________________________________________________ EXPECTED LENGTH OF STAY: 4d 16h ACTUAL LENGTH OF STAY:          5 Bianka Nelson MD

## 2020-07-25 NOTE — PROGRESS NOTES
Problem: Falls - Risk of 
Goal: *Absence of Falls Description: Document Alicia Nixon Fall Risk and appropriate interventions in the flowsheet. 7/25/2020 1918 by Megha Parra Outcome: Progressing Towards Goal 
Note: Fall Risk Interventions: 
Mobility Interventions: Patient to call before getting OOB, Utilize gait belt for transfers/ambulation Medication Interventions: Patient to call before getting OOB Elimination Interventions: Call light in reach, Patient to call for help with toileting needs 7/25/2020 1917 by Megha Parra Outcome: Progressing Towards Goal 
Note: Fall Risk Interventions: 
Mobility Interventions: Patient to call before getting OOB, Utilize gait belt for transfers/ambulation Medication Interventions: Patient to call before getting OOB Elimination Interventions: Call light in reach, Patient to call for help with toileting needs Call prior to getting oob, walker used if necessary. Problem: Patient Education: Go to Patient Education Activity Goal: Patient/Family Education 7/25/2020 1918 by Megha Parra Outcome: Progressing Towards Goal 
7/25/2020 1917 by Megha Parra Outcome: Progressing Towards Goal 
  
Problem: LVAD: Discharge Outcomes Goal: *Hemodynamically stable 7/25/2020 1918 by Megha Parra Outcome: Progressing Towards Goal 
Note: Map 80-90 
7/25/2020 1917 by Megha Parra Outcome: Progressing Towards Goal 
Note: Map stable 80-90 Problem: Pressure Injury - Risk of 
Goal: *Prevention of pressure injury Description: Document Mich Scale and appropriate interventions in the flowsheet. 7/25/2020 1918 by Megha Parra Outcome: Progressing Towards Goal 
Note: Pressure Injury Interventions: Activity Interventions: Increase time out of bed, Pressure redistribution bed/mattress(bed type) Mobility Interventions: Chair cushion, Pressure redistribution bed/mattress (bed type) Nutrition Interventions: Discuss nutritional consult with provider, Document food/fluid/supplement intake Friction and Shear Interventions: Lift team/patient mobility team, Minimize layers 7/25/2020 1917 by Jagruti Almaguer Outcome: Progressing Towards Goal 
Note: Pressure Injury Interventions: Activity Interventions: Increase time out of bed, Pressure redistribution bed/mattress(bed type) Mobility Interventions: Chair cushion, Pressure redistribution bed/mattress (bed type) Nutrition Interventions: Discuss nutritional consult with provider, Document food/fluid/supplement intake Friction and Shear Interventions: Lift team/patient mobility team, Minimize layers

## 2020-07-25 NOTE — PROGRESS NOTES
0730 Bedside shift change report given to yesica mccormick rn (oncoming nurse) by Carli Nicole rn (offgoing nurse). Report included the following information SBAR, Kardex, ED Summary and Cardiac Rhythm paced. 1930 Bedside shift change report given to [employer] rn (oncoming nurse) by Sandie Vital rn (offgoing nurse). Report included the following information SBAR, Kardex, ED Summary, [facility] and Cardiac Rhythm paced.

## 2020-07-25 NOTE — PROGRESS NOTES
0730: Bedside and Verbal shift change report given to Carla, 24[address] [facility] and Jelena Plunkett RN (oncoming nurse) by [facility], RN (offgoing nurse). Report included the following information SBAR, Kardex, Intake/Output, MAR, Accordion, Recent Results and Cardiac Rhythm paced. 0945: urology NP at bedside, plan to keep neal in place for now    [id number]: LVAD driveline dressing changed per order, sterile procedure followed    1930: Bedside and Verbal shift change report given to Collins Rodriguez RN (oncoming nurse) by KATRIN Aguirre and Jelena Plunkett RN (offgoing nurse). Report included the following information SBAR, Kardex, Intake/Output, MAR, Accordion, Recent Results and Cardiac Rhythm paced. Problem: Falls - Risk of  Goal: *Absence of Falls  Description: Document Brayden Singh Fall Risk and appropriate interventions in the flowsheet.   Outcome: Progressing Towards Goal  Note: Fall Risk Interventions:  Mobility Interventions: Bed/chair exit alarm, Communicate number of staff needed for ambulation/transfer, Patient to call before getting OOB         Medication Interventions: Evaluate medications/consider consulting pharmacy, Patient to call before getting OOB, Teach patient to arise slowly    Elimination Interventions: Call light in reach, Patient to call for help with toileting needs, Toileting schedule/hourly rounds              Problem: Patient Education: Go to Patient Education Activity  Goal: Patient/Family Education  Outcome: Progressing Towards Goal     Problem: LVAD: Discharge Outcomes  Goal: *Hemodynamically stable  Outcome: Progressing Towards Goal  Goal: *Lungs clear or at baseline  Outcome: Progressing Towards Goal  Goal: *Tolerating diet  Outcome: Progressing Towards Goal  Goal: *LVAD parameters within set limits  Outcome: Progressing Towards Goal  Goal: *LVAD drive line site without signs and symptoms of wound complications  Outcome: Progressing Towards Goal     Problem: Pressure Injury - Risk of  Goal: *Prevention of pressure injury  Description: Document Mich Scale and appropriate interventions in the flowsheet. Outcome: Progressing Towards Goal  Note: Pressure Injury Interventions: Activity Interventions: Assess need for specialty bed, Pressure redistribution bed/mattress(bed type), PT/OT evaluation    Mobility Interventions: Assess need for specialty bed, Pressure redistribution bed/mattress (bed type), Turn and reposition approx.  every two hours(pillow and wedges)    Nutrition Interventions: Document food/fluid/supplement intake    Friction and Shear Interventions: Apply protective barrier, creams and emollients, Lift sheet, Minimize layers                Problem: Patient Education: Go to Patient Education Activity  Goal: Patient/Family Education  Outcome: Progressing Towards Goal     Problem: Nutrition Deficit  Goal: *Optimize nutritional status  Outcome: Progressing Towards Goal

## 2020-07-25 NOTE — PROGRESS NOTES
1930  Report received from [facility]. 3603 Blood Culture from 7/24/20 at 0354 results Gram Negative Rods Problem: Falls - Risk of 
Goal: *Absence of Falls Description: Document Deb Godinez Fall Risk and appropriate interventions in the flowsheet. Outcome: Progressing Towards Goal 
Note: Fall Risk Interventions: 
Mobility Interventions: Bed/chair exit alarm, Communicate number of staff needed for ambulation/transfer, Patient to call before getting OOB Medication Interventions: Evaluate medications/consider consulting pharmacy, Patient to call before getting OOB, Teach patient to arise slowly Elimination Interventions: Call light in reach, Patient to call for help with toileting needs, Toileting schedule/hourly rounds

## 2020-07-25 NOTE — PROGRESS NOTES
No Complaints Afebrile Creatinine 1.62 Urine clear. Follow up with Dr. William Wilson to review pathology upon discharge to determine further treatment and evaluation for bladder tumor.

## 2020-07-26 NOTE — PROGRESS NOTES
1930 Bedside and Verbal shift change report given to Eliza Andrews (oncoming nurse) by Dustin Tillman RN (offgoing nurse). Report included the following information SBAR, Kardex, MAR and Alarm Parameters . 0405 during pt's assessment, urine was noted to have turned from pink to red with increase of red streaking and flecks. No retention noted, neal is draining and patent, pt is not uncomfortable. Spoke with MD on call, Sunil Graves NP, he ordered to continue to monitor pt and to call back if pt began to retain urine or became uncomfortable. He deferred to urologist due to pt's past history and recent procedure CYSTO RETROGRADE PYLEOGRAM/ Bladder Biopsy/TURBT. 9856 Paged pt's urologist  
 
2800 spoke with MD on call, DR Galvan with [facility]. Dr Galvan ordered PRN neal irrigation. Will continue to monitor Pt and neal system. 0730 Bedside and Verbal shift change report given to Claritza Lorenz (oncoming nurse) by Mayank Barbour RN (offgoing nurse). Report included the following information SBAR, Kardex, MAR and Alarm Parameters .

## 2020-07-26 NOTE — PROGRESS NOTES
0730 Bedside shift change report given to yesica mccormick rn (oncoming nurse) by Beata Rodriguez rn   (offgoing nurse). Report included the following information SBAR, ED Summary, OR Summary, Procedure Summary, Med Rec Status, Cardiac Rhythm paced and Alarm Parameters . 1930 Bedside shift change report given to Devora Lopes RN (oncoming nurse) by Ronny Hyde RN (offgoing nurse). Report included the following information SBAR, Kardex, ED Summary, OR Summary, MAR, Med Rec Status and Cardiac Rhythm paced.

## 2020-07-26 NOTE — PROGRESS NOTES
4081 [facility] 904 [facility] in [place], [place] Inpatient Progress Note Patient name: Vikki Rodríguez Patient : 1950 Patient MRN: [id number] Attending MD: Fabian Diaz MD 
Date of service: 20 REASON FOR CONSULT: 
UTI in patient with LVAD PROCEDURE PERFORMED:  Cystoscopy, transurethral resection of large bladder tumor.  
POD 2  
 
24hr Events: NTproBNP [place] [address] MAPs 86-94 mmHg Multiple PI events upon VAD interrogation Hematuria - started Lizama irrigation PLAN: 
LVAD device high speed at 6000rpm and low speed at 5600rpm 
Cannot tolerate beta-blockers d/t severe RV failure Cannot tolerate ACE/ARB/ARNi due to persistent orthostasis despite increased fluid intake  
Cannot tolerate spironolactone due to IVVD and hyponatremia Continue digoxin 0.125 every other day, level 0.8; trend levels daily Monitor daily CardioMEMs readings, 20-22mmHg (20) hydralazine 10mg IV prn MAP's > 95mmHg LVEDD > 7cm; consider RHC to determine device speed Unable to tolerate anticoagulation due to hematuria; stable LDH off aspirin UA (20) large blood, RBC >100, mod leukocytes Urine cytology + high-grade urothelial carcinoma Diagnostic cystoscopy and TURBT (20) -  resection of large papillary and solid bladder tumor on posterior wall Nephrology consult appreciated 
bladder and renal US (20) No evidence for hydronephrosis or mass lesion, small left kidney Urology consult appreciated D/C IV zosyn and changed to IV merrem per ID  
ID consult appreciated Blood cultures (20) Pseudomonas in 2/4 bottles Blood cultures (20) Pseudomonas 2/4 bottles Blood cultures (20) Pseudomonas 2/4 bottles Blood cultures (20) Pseudomonas 2/4 bottles Wound culture (20) light staph aureus Gentamicin to Drive line exit site with daily dressing changes Will eventually need PICC placed for long term IV ABX, waiting for negative blood cultures Elevated sed rate- 109 (7/22/20) Continue soren Q po BID Fiber con 625 po daily Nutrition consult, appreciate recs Continue keppra,  Level 15.5 (7/18/20) Hospitalist assistance appreciated DNR Daily weights, regular cardiac diet, strict I/O Trend CBC, CMP, NTproBNP, Mag, LD, procalcitonin, digoxin Remain in CVSU 
  
IMPRESSION: 
Bladder Carcinoma Gross Hematuria UTI Metabolic encephalopathy Chronic systolic heart failure Stage D, NYHA class II symptoms Coronary artery disease Ischemic cardiomyopathy, LVEF 15% S/p HM3 LVAD as destination therapy (11/18/19 by Dr. Stew Nieves) S/p Impella 5.0 as bridge to LVAD 
HTN, goal MAP 70-90mmHg H/o VT s/p [facility] BIV-ICD PAF 
H/o recurrent UTI, pseudomonal infection Unable to tolerate AC due to hematuria MSSA drive line infection COPD 
JOSE Essential tremor on keppra Depression on effexor Persistently elevated CEA PET scan increased RML activity, not malignant per hemonc Orthostatic hypotension DNR Up-to-date with PNA vaccine - per PCP/wife 
2/4bottles blood cultures +pseudomonas (7/24/20) diarrhea Thank you for allowing us to participate in your patient's care. Mounika Wharton MD, Francisca Brown Chief of Cardiology, [facility] Medical Director Calos Rueda [address], [address] [facility], [facility] Office [phone] Fax [phone] CARDIAC IMAGING: 
TTE 4/20 shows large LVIDd, 6.84 cm, RVIDd 3.06 cm, TAPSE 1.15 cm, severely elevated CVP 
TTE 7/6/20- Mild AI, LViDd 6.91cm, RVIDd 5.16cm, TAPSE 1.39cm Echo (7/18/20) · Contrast used: DEFINITY. · Image quality for this study was technically difficult. · Severely dilated left ventricle. Wall thickness appears thin. Severe systolic function. Estimated left ventricular ejection fraction is <15%. Visually measured ejection fraction. · Moderately dilated left atrium. · Moderately dilated right ventricle. Moderately to severely reduced systolic function. · Dilated right atrium. · Moderate mitral valve prolapse. 
  
 
LVAD INTERROGATION: 
Device interrogated in person, Increased Speed to 6000rpm, low speed to 5600rpm 
Device function normal, normal flow, multiple PI events LVAD Pump Speed (RPM): 6000 Pump Flow (LPM): 4.9 MAP: 86 
PI (Pulsitility Index): 3.5 Power: 4.7  Test: Yes 
Back Up  at Bedside & Labeled: Yes Power Module Test: Yes Driveline Site Care: No 
Driveline Dressing: Clean, Dry, and Intact Outpatient: No 
MAP in Therapeutic Range (Outpatient): Yes Testing Alarms Reviewed: No 
Back up SC speed: 6000 Back up Low Speed Limit: 5600 Emergency Equipment with Patient?: Yes Emergency procedures reviewed?: Yes Drive line site inspected?: Yes Drive line intergrity inspected?: Yes Drive line dressing changed?: No 
 
PHYSICAL EXAM: 
Visit Vitals /84 Pulse 97 Temp 98.4 °F (36.9 °C) Resp 18 Ht 6' 2\" (1.88 m) Wt 179 lb 0.2 oz (81.2 kg) SpO2 98% BMI 22.98 kg/m² Review of Systems Constitutional: Positive for malaise/fatigue. Negative for chills, fever and weight loss. HENT: Positive for hearing loss. Eyes: Negative. Respiratory: Positive for shortness of breath. Improved Cardiovascular: Positive for leg swelling. Negative for chest pain, palpitations and orthopnea. Gastrointestinal: Negative. Genitourinary: Positive for hematuria. Negative for dysuria, flank pain and urgency. Musculoskeletal: Negative. Skin: Negative. Neurological: Positive for weakness. Endo/Heme/Allergies: Bruises/bleeds easily. Psychiatric/Behavioral: Positive for depression. Physical Exam 
Vitals signs and nursing note reviewed. Constitutional:   
   General: He is not in acute distress. Appearance: Normal appearance. HENT:  
   Head: Normocephalic. Mouth/Throat: Mouth: Mucous membranes are moist.  
Neck:  
   Vascular: No JVD. Cardiovascular:  
   Rate and Rhythm: Normal rate and regular rhythm. Heart sounds: Murmur present. Comments: +lvad hum Pulmonary:  
   Effort: Pulmonary effort is normal.  
   Breath sounds: Normal breath sounds. Abdominal:  
   General: Bowel sounds are normal.  
   Palpations: Abdomen is soft. Genitourinary: 
   Comments: Lizama catheter draining erica urine Musculoskeletal:  
   Right lower leg: Edema present. Left lower leg: Edema present. Skin: 
   General: Skin is warm and dry. Capillary Refill: Capillary refill takes 2 to 3 seconds. Comments: Scattered bruising on extremities Neurological:  
   Mental Status: He is alert and oriented to person, place, and time. Motor: Weakness present. Psychiatric:     
   Attention and Perception: Attention normal.     
   Mood and Affect: Mood normal.     
   Speech: Speech normal.     
   Behavior: Behavior is cooperative. Thought Content: Thought content normal.  
 
 
 
 
 
PAST MEDICAL HISTORY: 
Past Medical History:  
Diagnosis Date  BPH (benign prostatic hyperplasia)  CHF (congestive heart failure) ([address]  Degenerative disc disease, lumbar  Heart failure ([address]  High cholesterol  Hypertension  Ischemic cardiomyopathy  LVAD (left ventricular assist device) present ([address]  Paroxysmal atrial fibrillation ([address] 4/2/2019  Spinal stenosis PAST SURGICAL HISTORY: 
Past Surgical History:  
Procedure Laterality Date  COLONOSCOPY Left 11/11/2019 COLONOSCOPY at bedside performed by Orlando De Anda MD at [facility] 391  HX CORONARY ARTERY BYPASS GRAFT    
 triple  HX HEART ASSIST DEVICE Left 10/29/2019 Impella 5.0  
 HX HEART ASSIST DEVICE Left 11/18/2019 HeartMate 3  
 HX HERNIA REPAIR    
 HX IMPLANTABLE CARDIOVERTER DEFIBRILLATOR  HX THROMBECTOMY Left 11/25/2019 Left brachial thrombectomy  TX CARDIOVERSION ELECTIVE ARRHYTHMIA EXTERNAL N/A 6/10/2019 EP CARDIOVERSION performed by Haydee Neal MD at [address], [facility] Dr CATH LAB  TX CARDIOVERSION ELECTIVE ARRHYTHMIA EXTERNAL N/A 2019 EP [facility] CATH LAB  TX INSJ/RPLCMT PERM DFB W/TRNSVNS LDS 1/DUAL CHMBR N/A 2019 INSERT ICD BIV MULTI performed by Carmelo Fonseca MD at [address], [facility] Dr CATH LAB  TX TCAT IMPL WRLS P-ART PRS SNR L-T HEMODYN MNTR N/A 2019 IMPLANT HEART FAILURE MONITORING DEVICE [facility] CATH LAB FAMILY HISTORY: 
Family History Problem Relation Age of Onset  Lung Disease Mother  Hypertension Mother [facility] Arthritis-osteo Mother  Heart Disease Mother  Heart Disease Father  Diabetes Father SOCIAL HISTORY: 
Social History Socioeconomic History  Marital status:  Spouse name: Not on file  Number of children: Not on file  Years of education: Not on file  Highest education level: Not on file Tobacco Use  Smoking status: Former Smoker Last attempt to quit: 2010 Years since quittin.6  Smokeless tobacco: Never Used Substance and Sexual Activity  Alcohol use: Not Currently Comment: rarely  Drug use: Never Other Topics Concern LABORATORY RESULTS: 
  
Labs Latest Ref Rng & Units 2020 WBC 4.1 - 11.1 K/uL 6.1 7.4 6.9 6.4 6.2 - -  
RBC 4.10 - 5.70 M/uL 2.47(L) 2.47(L) 2.58(L) 2.41(L) 2.46(L) - - Hemoglobin 12.1 - 17.0 g/dL 7. 4(L) 7. 3(L) 7. 7(L) 7. 2(L) 7. 2(L) 7. 2(L) - Hematocrit 36.6 - 50.3 % 24. 2(L) 24. 3(L) 24. 7(L) 23. 7(L) 24. 0(L) 23. 6(L) -  
MCV 80.0 - 99.0 FL 98.0 98.4 95.7 98.3 97.6 - - Platelets 384 - 832 K/uL 180 202 191 201 169 - - Lymphocytes 12 - 49 % - 11(L) - 10(L) - - - Monocytes 5 - 13 % - 6 - 7 - - -  
 Eosinophils 0 - 7 % - 2 - 3 - - - Basophils 0 - 1 % - 1 - 1 - - - Albumin 3.5 - 5.0 g/dL 2. 7(L) 2. 8(L) 2. 8(L) 2. 9(L) 3.4(L) - 3. 2(L) Calcium 8.5 - 10.1 MG/DL 8.7 8.6 8.5 8.4(L) 8.7 - 8.5 Glucose 65 - 100 mg/dL 96 95 110(H) 87 121(H) - 95 BUN 6 - 20 MG/DL 23(H) 30(H) 29(H) 26(H) 24(H) - 21(H) Creatinine 0.70 - 1.30 MG/DL 1.27 1.62(H) 1.62(H) 1.68(H) 1.70(H) - 1.61(H) Sodium 136 - 145 mmol/L 137 137 135(L) 135(L) 137 - 136 Potassium 3.5 - 5.1 mmol/L 3.9 3.9 4.4 4.0 4.2 - 4.4 TSH 0.36 - 3.74 uIU/mL - - - - - - -  
PSA 0.01 - 4.0 ng/mL - - - - - - -  
LDH 85 - 241 U/L 166 204 171 192 212 - - Some recent data might be hidden Lab Results Component Value Date/Time TSH 1.13 07/18/2020 11:28 AM  
 TSH 1.70 04/21/2020 01:59 PM  
 TSH 2.30 12/03/2019 03:29 AM  
 TSH 2.40 10/25/2019 07:39 PM  
 TSH 2.45 06/01/2019 04:16 AM  
 
 
ALLERGY: 
Allergies Allergen Reactions  Cefepime Other (comments)  
  myoclonus CURRENT MEDICATIONS: 
 
Current Facility-Administered Medications:  
  meropenem (MERREM) 500 mg in 0.9% sodium chloride (MBP/ADV) 50 mL, 500 mg, IntraVENous, Q6H, Geoff Godfrey MD, Last Rate: 100 mL/hr at 07/26/20 0849, 500 mg at 07/26/20 5208   hydrALAZINE (APRESOLINE) 20 mg/mL injection 10 mg, 10 mg, IntraVENous, Q4H PRN, Jacky Rehman NP, 10 mg at 07/25/20 2336   magnesium oxide (MAG-OX) tablet 400 mg, 400 mg, Oral, BID, Jacky Rehman NP, 400 mg at 07/26/20 0746   epoetin yvonne-epbx (RETACRIT) injection 10,000 Units, 10,000 Units, SubCUTAneous, Q MON, WED & FRI, Greta Kaur MD, 10,000 Units at 07/24/20 2233 
  0.9% sodium chloride infusion 250 mL, 250 mL, IntraVENous, PRN, Shana Sims B, NP 
  calcium polycarbophiL (FIBERCON) tablet 625 mg, 1 Tab, Oral, DAILY, Jacky Rehman NP, 625 mg at 07/26/20 0847 
  sodium chloride (NS) flush 5-40 mL, 5-40 mL, IntraVENous, Q8H, Hyun Zhao DO, 10 mL at 07/26/20 3403   sodium chloride (NS) flush 5-40 mL, 5-40 mL, IntraVENous, PRN, Marianaa Rubench M, DO 
  acetaminophen (TYLENOL) tablet 650 mg, 650 mg, Oral, Q4H PRN, Arleta Mulch M, DO, 650 mg at 07/23/20 1241   diphenhydrAMINE (BENADRYL) injection 12.5 mg, 12.5 mg, IntraVENous, Q4H PRN, Kitty Banegas M, DO 
  [facility]ron [facility] injection 4 mg, 4 mg, IntraVENous, Q4H PRN, Arleta Rubench M, DO, 4 mg at 07/22/20 0348   bisacodyL (DULCOLAX) tablet 5 mg, 5 mg, Oral, DAILY PRN, Arleta Mulch M, DO 
  digoxin (LANOXIN) tablet 0.125 mg, 0.125 mg, Oral, EVERY OTHER DAY, Marianaa Karl M, DO, 0.125 mg at 07/26/20 5376   finasteride (PROSCAR) tablet 5 mg, 5 mg, Oral, DAILY, [employer], DO, 5 mg at 07/26/20 8231   lactobac ac& pc-s.therm-b.anim (TIAN Q/RISAQUAD), 1 Cap, Oral, DAILY, Mazin Phalen [employer], DO, 1 Cap at 07/26/20 6954   levETIRAcetam (KEPPRA) tablet 250 mg, 250 mg, Oral, BID, Lorraine Barajas M, DO, 250 mg at 07/26/20 8646   tamsulosin (FLOMAX) capsule 0.8 mg, 0.8 mg, Oral, QHS, Hyun Zhao, DO, 0.8 mg at 07/25/20 2130 
  venlafaxine-SR (EFFEXOR-XR) capsule 150 mg, 150 mg, Oral, DAILY WITH BREAKFAST, Hyun Zhao, DO, 150 mg at 07/26/20 1501 Thank you for allowing me to participate in this patient's care. MD Calos Trevizo [place] [address] [address] [place] [address], [address] Phone: [phone] Fax: [phone]

## 2020-07-26 NOTE — PROGRESS NOTES
1930 Bedside and Verbal shift change report given to Katrina Lopez (oncoming nurse) by Rochell Runner, RN (offgoing nurse). Report included the following information SBAR, Kardex, MAR and Alarm Parameters . 0730 Bedside and Verbal shift change report given to Claritza Lorenz (oncoming nurse) by Donaldo Kaplan RN (offgoing nurse). Report included the following information SBAR, Kardex, MAR and Alarm Parameters .

## 2020-07-26 NOTE — PROGRESS NOTES
1958: Report received from Fern Rodriguez RN 
 
0300: Pt moaning and restless. Denies pain, need for repositioning. C/o feeling cold and provided with blankets. VS performed. 0600: Bathed, linen change, and medicated with Benadryl d/t repeatedly scratching chest where leads are placed. 0800: Bedside and Verbal shift change report given to [address] [place] [address] (oncoming nurse) by Polly Smith RN (offgoing nurse). Report included the following information SBAR, Kardex, Procedure Summary, Intake/Output, MAR, Recent Results, Med Rec Status and Cardiac Rhythm Paced.

## 2020-07-27 NOTE — PROGRESS NOTES
Transitions of Care Plan: 
            RUR: 33% Bacteremia; long term IV abx Baseline: rollator and WC; LVAD; open with [facility] Disposition: home with [facility] - IV abx; LVAD Patient continues with positive blood cultures over the weekend. CM monitoring for IV abx orders prior to discharge. Patient open with [facility] for SN and PT. Therapy ordered while inpatient to prevent deconditioning prior to discharge. CM will continue to monitor.  
 
Jose M Perez, MPH

## 2020-07-27 NOTE — PROGRESS NOTES
Problem: Mobility Impaired (Adult and Pediatric) Goal: *Acute Goals and Plan of Care (Insert Text) Description: FUNCTIONAL STATUS PRIOR TO ADMISSION: Patient was modified independent using a rolling walker for functional mobility. Patient reports x 3 falls within the last 3 months (2* dizziness/lightheadedness). Patient's wife assists with LVAD power-transitions as needed. Patient reports ability to dress self (seated) and bathe (seated). Patient nocturnally wears a CPAP vs. 2 L/min NC. Patient received his LVAD in 2019. 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 HOME SUPPORT PRIOR TO ADMISSION: The patient lived with his wife, but did not require assistance with mobility. Patient did utilize a wheelchair for [facility] MD appointments. Physical Therapy Goals Initiated 7/27/2020 1. Patient will move from supine to sit and sit to supine, scoot up and down, and roll side to side in bed with modified independence within 7 days. 2.  Patient will perform sit to/from stand with modified independence within 7 days. 3.  Patient will ambulate 150 feet with least restrictive assistive device and modified independence within 7 days. 4.  Patient will ascend/descend 4 stairs with single handrail with supervision within 7 days. 5.  Patient will perform power exchange for power module to/from battery with supervision within 7 days. Outcome: Not Met PHYSICAL THERAPY EVALUATION Patient: Allan Lentz (75 y.o. male) Date: 7/27/2020 Primary Diagnosis: Acute encephalopathy [G93.40] Acute encephalopathy [G93.40] Procedure(s) (LRB): 
CYSTO RETROGRADE PYELOGRAM/  BLADDER BIOPSY/ TURBT (N/A) 4 Days Post-Op Precautions:  (old LVAD), DNR 
 
ASSESSMENT Based on the objective data described below, the patient presents with impaired cardiopulmonary tolerance, general debility (admitted x 10 days and has declined hallway ambulation), decreased LE strength (R LE weaker than L LE; R LE grossly 4/5, L LE grossly 4+ to 5/5), hematuria with recurrent UTIs (s/p TURBT), decreased activity tolerance, and oriented x 4. Patient pleasant and agreeable to PT evaluation. Overall, patient completed bed mobility with additional time and supervision (+ assisted management of power-leads), performed sit <> stand transfers from varying level/compliant surfaces with contact guard assistance (verbal reminding for hand-placement), and short-distance ambulation (~ 30 ft, x 3 repetitions) with constant support of rolling walker and contact guard assistance (increased assistance needed for negotiation within confined spaces (ie - bathroom). Encouraged patient to sit up for all meals for pulmonary toileting and to ensure patient is getting OOB consistently; patient agreeable. Current Level of Function Impacting Discharge (mobility/balance): Functional Outcome Measure: The patient scored 40/100 on the Barthel Index outcome measure which is indicative of a 60% impaired-ability to independently perform self-care tasks and functional mobility. Other factors to consider for discharge: Supportive wife, Potential need for long-term antibiotics, LVAD implant 2019 Patient will benefit from skilled therapy intervention to address the above noted impairments. PLAN : 
Recommendations and Planned Interventions: bed mobility training, transfer training, gait training, therapeutic exercises, patient and family training/education, and therapeutic activities Frequency/Duration: Patient will be followed by physical therapy:  5 times a week to address goals. Recommendation for discharge: (in order for the patient to meet his/her long term goals) Physical therapy at least 2 days/week in the home AND ensure assist and/or supervision for safety with functional mobility This discharge recommendation: A follow-up discussion with the attending provider and/or case management is planned IF patient discharges home will need the following DME: none, owns rolling walker, SPC, wheelchair SUBJECTIVE:  
Patient stated Yes ma'am. OBJECTIVE DATA SUMMARY:  
HISTORY:   
Past Medical History:  
Diagnosis Date  
 BPH (benign prostatic hyperplasia) CHF (congestive heart failure) ([facility] Degenerative disc disease, lumbar Heart failure ([facility] High cholesterol Hypertension Ischemic cardiomyopathy LVAD (left ventricular assist device) present ([facility] Paroxysmal atrial fibrillation ([facility] 4/2/2019 Spinal stenosis Past Surgical History:  
Procedure Laterality Date COLONOSCOPY Left 11/11/2019 COLONOSCOPY at bedside performed by Jimena Daugherty MD at [address] [place] [address] HX CORONARY ARTERY BYPASS GRAFT    
 triple HX HEART ASSIST DEVICE Left 10/29/2019 Impella 5.0 HX HEART ASSIST DEVICE Left 11/18/2019 HeartMate 3 HX HERNIA REPAIR    
 HX IMPLANTABLE CARDIOVERTER DEFIBRILLATOR    
 HX THROMBECTOMY Left 11/25/2019 Left brachial thrombectomy ND CARDIOVERSION ELECTIVE ARRHYTHMIA EXTERNAL N/A 6/10/2019 EP CARDIOVERSION performed by Haydee Neal MD at [place] [address] CATH LAB  
 ND CARDIOVERSION ELECTIVE ARRHYTHMIA EXTERNAL N/A 6/18/2019 EP CARDIOVERSION performed by Aurea Gottlieb MD at [place] [address], [address] CATH LAB  
 ND INSJ/RPLCMT PERM DFB W/TRNSVNS LDS 1/DUAL [facility] N/A 6/21/2019 INSERT ICD BIV MULTI performed by Carmelo Fonseca MD at [place] [address], [address] CATH LAB  
 ND TCAT IMPL WRLS P-ART PRS SNR L-T HEMODYN MNTR N/A 9/18/2019 IMPLANT HEART FAILURE MONITORING DEVICE performed by Paris Haley MD at [place] [address], [address] CATH LAB Personal factors and/or comorbidities impacting plan of care: PMH Home Situation Home Environment: Private residence # Steps to Enter: 4 Rails to Enter: Yes Hand Rails : (Single railing) One/Two Story Residence: One story Living Alone: No 
Support Systems: Spouse/Significant Other/Partner Patient Expects to be Discharged to[de-identified] Private residence Current DME Used/Available at Home: Florette Party, straight, Walker, rolling, Wheelchair Tub or Shower Type: (Sponge-bathing) EXAMINATION/PRESENTATION/DECISION MAKING:  
Critical Behavior: 
Neurologic State: Alert Orientation Level: Oriented X4 Cognition: Appropriate decision making, Follows commands Safety/Judgement: Awareness of environment Hearing: Auditory Auditory Impairment: None Skin:  Intact Edema: Trace noted in LEs Range Of Motion: 
AROM: Generally decreased, functional 
  
  
  
  
  
  
  
Strength:   
Strength: Generally decreased, functional(R LE slightly globally weaker than L LE) Tone & Sensation:  
Tone: Normal 
  
  
  
  
Sensation: (Patient denied numbness in LEs) Coordination: 
Coordination: Generally decreased, functional(Hx of cerebellar ataxia) Functional Mobility: 
Bed Mobility: 
  
Supine to Sit: Supervision; Additional time(Assist with management of power leads) Scooting: Supervision; Additional time Transfers: 
Sit to Stand: Contact guard assistance; Adaptive equipment Stand to Sit: Contact guard assistance; Adaptive equipment Bed to Chair: Contact guard assistance; Adaptive equipment(+ RW) Balance:  
Sitting: Intact Standing: Impaired; With support Standing - Static: Good;Constant support Standing - Dynamic : Fair;Constant support Ambulation/Gait Training: 
Distance (ft): 30 Feet (ft)(x 3 trials, seated rest breaks between each) Assistive Device: Gait belt;Walker, rolling Ambulation - Level of Assistance: Contact guard assistance; Adaptive equipment Gait Description (WDL): Exceptions to [facility] Gait Abnormalities: Decreased step clearance Base of Support: Narrowed Speed/Jacqueline: Shuffled Step Length: Right shortened;Left shortened Functional Measure: 
Barthel Index: 
 
Bathin Bladder: 0 Bowels: 10 
Groomin Dressin Feedin Mobility: 0 Stairs: 0 Toilet Use: 5 Transfer (Bed to Chair and Back): 10 Total: 40/100 The Barthel ADL Index: Guidelines 1. The index should be used as a record of what a patient does, not as a record of what a patient could do. 2. The main aim is to establish degree of independence from any help, physical or verbal, however minor and for whatever reason. 3. The need for supervision renders the patient not independent. 4. A patient's performance should be established using the best available evidence. Asking the patient, friends/relatives and nurses are the usual sources, but direct observation and common sense are also important. However direct testing is not needed. 5. Usually the patient's performance over the preceding 24-48 hours is important, but occasionally longer periods will be relevant. 6. Middle categories imply that the patient supplies over 50 per cent of the effort. 7. Use of aids to be independent is allowed. Renford Brittle., Barthel, D.W. (2878). Functional evaluation: the Barthel Index. [address] [facility] (14)2. Wilmer Webster gabriela BRAD VelezF, Claudia Schaffer., Jennifer Lu., [place], [address] [facility] (1999). Measuring the change indisability after inpatient rehabilitation; comparison of the responsiveness of the Barthel Index and Functional [place] Measure. Journal of Neurology, Neurosurgery, and Psychiatry, 66(4), 565-441. Gretta Castro NMARY.LEBRON, SEVERO Lindsay, & Viraj Cobb MLILIAN. (2004.) Assessment of post-stroke quality of life in cost-effectiveness studies: The usefulness of the Barthel Index and the EuroQoL-5D. [facility], 13, 512-40 Based on the above components, the patient evaluation is determined to be of the following complexity level: LOW Pain Rating: 
Patient denied pain. Activity Tolerance:  
Fair, requires rest breaks, and observed SOB with activity (However, SpO2 99% on room air) Please refer to the flowsheet for vital signs taken during this treatment. After treatment patient left in no apparent distress:  
Sitting in chair, Call bell within reach, and Caregiver / family present COMMUNICATION/EDUCATION:  
The patients plan of care was discussed with: Occupational therapist and Registered nurse. Fall prevention education was provided and the patient/caregiver indicated understanding., Patient/family have participated as able in goal setting and plan of care. , and Patient/family agree to work toward stated goals and plan of care. Thank you for this referral. 
Blanca Bonilla, PT, DPT Time Calculation: 30 mins

## 2020-07-27 NOTE — PROGRESS NOTES
[facility] VAD/Heart Failure Progress Note Admit Date: 2020 POD:  4 Days Post-Op Procedure:  Procedure(s): 
CYSTO RETROGRADE PYELOGRAM/  BLADDER BIOPSY/ TURBT Subjective:  
Mild dyspnea, fatigue, and weakness; abx's for UTI; confusion at times; PI events Objective:  
Vitals: 
Blood pressure 112/84, pulse 80, temperature 97.9 °F (36.6 °C), resp. rate 16, height 6' 2\" (1.88 m), weight 179 lb 14.3 oz (81.6 kg), SpO2 99 %. Temp (24hrs), Av.3 °F (36.8 °C), Min:97.9 °F (36.6 °C), Max:98.9 °F (37.2 °C) Hemodynamics: 
 CO:   
 CI:   
 CVP:   
 SVR:   
 PAP Systolic:   
 PAP Diastolic:   
 PVR:   
 AF77:   
 SCV02:   
 
VAD Interrogation: LVAD (Heartmate) Pump Speed (RPM): 6000 Pump Flow (LPM): 5 Chatter in Lines: No 
PI (Pulsitility Index): 3.7 Power: 4.7 MAP: 82  Test: No 
Back Up  at Bedside & Labeled: Yes Power Module Test: No 
Driveline Site Care: Yes Driveline Dressing: Changed per order EKG/Rhythm:   
 
Extubation Date / Time:  
 
CT Output:  
 
Ventilator: 
  
 
Oxygen Therapy: 
Oxygen Therapy O2 Sat (%): 99 % (20 1508) Pulse via Oximetry: 80 beats per minute (20 0520) O2 Device: Nasal cannula (20 1107) O2 Flow Rate (L/min): 2 l/min (20 1107) CXR: 
 
Admission Weight: Last Weight Weight: 175 lb 0.7 oz (79.4 kg) Weight: 179 lb 14.3 oz (81.6 kg) Intake / Output / Drain: 
Current Shift: 701 -  1900 In: 340 [P.O.:240; I.V.:100] Out: 850 [Urine:850] Last 24 hrs.:  
 
Intake/Output Summary (Last 24 hours) at 2020 1532 Last data filed at 2020 1411 Gross per 24 hour Intake 640 ml Output 1600 ml Net -960 ml No results for input(s): CPK, CKMB, TROIQ in the last 72 hours. Recent Labs  
  20 
0303 20 
1323 20 
0331 20 
0310   --  137 137  
K 4.1  --  3.9 3.9 CO2 24  --  25 25 BUN 21*  --  23* 30* CREA 1.30  --  1.27 1.62*   --  96 95 PHOS 2.8  --  3.0  --   
MG 2.0  --  2.0 2.3 WBC 7.3  --  6.1 7.4 HGB 7.7* 7.6* 7.4* 7.3* HCT 25.0*  --  24.2* 24.3*  
  --  180 202 No results for input(s): INR, PTP, APTT, INREXT in the last 72 hours. No lab exists for component: PBNP Current Facility-Administered Medications:  
  hydrALAZINE (APRESOLINE) tablet 10 mg, 10 mg, Oral, TID, Андрей Dai NP, 10 mg at 07/27/20 1410 
  gentamicin (GARAMYCIN) 0.1 % cream, , Topical, BID Mon Wed & Fri, Андрей Dai NP 
  meropenem (MERREM) 500 mg in 0.9% sodium chloride (MBP/ADV) 50 mL, 500 mg, IntraVENous, Q6H, Geoff Godfrey MD, Last Rate: 100 mL/hr at 07/27/20 1411, 500 mg at 07/27/20 1411   hydrALAZINE (APRESOLINE) 20 mg/mL injection 10 mg, 10 mg, IntraVENous, Q4H PRN, Андрей Dai NP, 10 mg at 07/27/20 7815   magnesium oxide (MAG-OX) tablet 400 mg, 400 mg, Oral, BID, Андрей Dai NP, 400 mg at 07/27/20 0318   epoetin yvonne-epbx (RETACRIT) injection 10,000 Units, 10,000 Units, SubCUTAneous, Q MON, WED & FRI, Mark Matthew MD, 10,000 Units at 07/24/20 2233 
  0.9% sodium chloride infusion 250 mL, 250 mL, IntraVENous, PRN, Shana Sims, NP 
  calcium polycarbophiL (FIBERCON) tablet 625 mg, 1 Tab, Oral, DAILY, Андрей Dai NP, 625 mg at 07/27/20 0947 
  sodium chloride (NS) flush 5-40 mL, 5-40 mL, IntraVENous, Q8H, Hyun Zhao DO, 10 mL at 07/27/20 1410 
  sodium chloride (NS) flush 5-40 mL, 5-40 mL, IntraVENous, PRN, Anselmo FRENCH,  
  acetaminophen (TYLENOL) tablet 650 mg, 650 mg, Oral, Q4H PRN, Anselmo FRENCH DO, 650 mg at 07/27/20 0947 
  ondansetron (ZOFRAN) injection 4 mg, 4 mg, IntraVENous, Q4H PRN, Anselmo FRENCH DO, 4 mg at 07/22/20 8076   bisacodyL (DULCOLAX) tablet 5 mg, 5 mg, Oral, DAILY PRN, [place], [employer] M, DO 
  digoxin (LANOXIN) tablet 0.125 mg, 0.125 mg, Oral, EVERY OTHER DAY, Omer Castillo, [employer] M, DO, 0.125 mg at 07/26/20 5228   finasteride (PROSCAR) tablet 5 mg, 5 mg, Oral, DAILY, Hyun Zhao M, DO, 5 mg at 07/27/20 9550 
  [facility] ac& pc-s.therm-b.anim (TIAN [place]), 1 Cap, Oral, DAILY, Dordenice Slateran, DO, 1 Cap at 07/27/20 9796   levETIRAcetam (KEPPRA) tablet 250 mg, 250 mg, Oral, BID, Nicolasa George M, DO, 250 mg at 07/27/20 2503   tamsulosin (FLOMAX) capsule 0.8 mg, 0.8 mg, Oral, QHS, Hyun Zhao M, DO, 0.8 mg at 07/26/20 2322 
  venlafaxine-SR (EFFEXOR-XR) capsule 150 mg, 150 mg, Oral, DAILY WITH BREAKFAST, Hyun Zhao, DO, 150 mg at 07/27/20 8251 A/P 
S/P LVAD - good flows Need for anticoagulation - on hodl due to recurrent bleeds UTI - abx's Confusion - monitor 
  
Risk of morbidity and mortality - high Medical decision making - high complexity Signed By: Chilo Shea MD

## 2020-07-27 NOTE — PROGRESS NOTES
Problem: Falls - Risk of 
Goal: *Absence of Falls Description: Document Luann Frankenmuth Fall Risk and appropriate interventions in the flowsheet. Outcome: Progressing Towards Goal 
Note: Fall Risk Interventions: 
Mobility Interventions: Patient to call before getting OOB Medication Interventions: Patient to call before getting OOB, Evaluate medications/consider consulting pharmacy Elimination Interventions: Call light in reach, Patient to call for help with toileting needs Problem: Pressure Injury - Risk of 
Goal: *Prevention of pressure injury Description: Document Mich Scale and appropriate interventions in the flowsheet. Outcome: Progressing Towards Goal 
Note: Pressure Injury Interventions: Activity Interventions: Increase time out of bed, PT/OT evaluation Mobility Interventions: Chair cushion, Pressure redistribution bed/mattress (bed type) Nutrition Interventions: Discuss nutritional consult with provider, Document food/fluid/supplement intake Friction and Shear Interventions: HOB 30 degrees or less, Minimize layers

## 2020-07-27 NOTE — PROGRESS NOTES
0730: Bedside and Verbal shift change report given to Carla, [address] [facility] (oncoming nurse) by Polly Smith RN (offgoing nurse). Report included the following information SBAR, Kardex, Intake/Output, MAR, Accordion, Recent Results and Cardiac Rhythm paced. 65: RN at bedside, patient moaning out says he \"just doesn't feel good\". States no pain at this time, PRN tylenol given due to behavioral pain scale 1230: NP at bedside, manual neal irrigation completed by provider. Urine clear yellow at this time 1430: Heart failure NP at bedside to assess driveline. Driveline dressing change completed, sterile procedure followed 1530: Bedside and verbal shift change report given to Alejandrina Read RN (oncoming nurse) by KATRIN Aguirre (offgoing nurse). Report included the following information SBAR, Kardex, Intake/Output, MAR, Accordion, Recent Results and Cardiac Rhythm paced. Problem: Falls - Risk of 
Goal: *Absence of Falls Description: Document Curt Arroyo Fall Risk and appropriate interventions in the flowsheet. Outcome: Progressing Towards Goal 
Note: Fall Risk Interventions: 
Mobility Interventions: Communicate number of staff needed for ambulation/transfer, OT consult for ADLs, Patient to call before getting OOB, PT Consult for mobility concerns, PT Consult for assist device competence Medication Interventions: Evaluate medications/consider consulting pharmacy, Patient to call before getting OOB, Teach patient to arise slowly Elimination Interventions: Call light in reach, Patient to call for help with toileting needs, Toileting schedule/hourly rounds Problem: Patient Education: Go to Patient Education Activity Goal: Patient/Family Education Outcome: Progressing Towards Goal 
  
Problem: LVAD: Discharge Outcomes Goal: *Hemodynamically stable Outcome: Progressing Towards Goal 
Goal: *Lungs clear or at baseline Outcome: Progressing Towards Goal 
Goal: *Tolerating diet Outcome: Progressing Towards Goal 
 Goal: *LVAD parameters within set limits Outcome: Progressing Towards Goal 
Goal: *LVAD drive line site without signs and symptoms of wound complications Outcome: Progressing Towards Goal 
  
Problem: Pressure Injury - Risk of 
Goal: *Prevention of pressure injury Description: Document Mich Scale and appropriate interventions in the flowsheet. Outcome: Progressing Towards Goal 
Note: Pressure Injury Interventions: Activity Interventions: Assess need for specialty bed, Pressure redistribution bed/mattress(bed type), PT/OT evaluation Mobility Interventions: Assess need for specialty bed, Pressure redistribution bed/mattress (bed type), PT/OT evaluation Nutrition Interventions: Document food/fluid/supplement intake Friction and Shear Interventions: Apply protective barrier, creams and emollients, HOB 30 degrees or less, Lift sheet, Minimize layers Problem: Patient Education: Go to Patient Education Activity Goal: Patient/Family Education Outcome: Progressing Towards Goal 
  
Problem: Nutrition Deficit Goal: *Optimize nutritional status Outcome: Progressing Towards Goal

## 2020-07-27 NOTE — PROGRESS NOTES
[address] [facility] Adult  Hospitalist Group Hospitalist Progress Note Augustin Quesada MD 
Answering service: [phone] or [phone] from in house phone Date of Service:  2020 NAME:  Cayden Lemus :  1950 MRN:  [id number] Admission Summary:  
  
71year old male with past medical history heart failure, LVEF 15%, on LVAD, paroxysmal atrial fibrillation, BPH, recent admission for hematuria, presenting to [facility] with progressive and worsening confusion.  Patient seen and examined and currently tangential in thought process.  Discussed with wife Winter Damon, via phone.  Per wife, patient has been progressively confused during the past week.  Reports he has not slept for approximately 3 nights.  At times, patient is coherent but then intermittently confused. Carmenza Ocasio has been doing things out of the ordinary such as calling his children early in the morning or late at night.  Also calling his siblings multiple times.  Per wife, patient is [employer] but usually does not speak about God to others. Guido Winchester my encounter, patient continuously spoke about the [employer] in his past.  Patient alert to name, place, situation but not year.  Denies chest pain, shortness of breath, cough, fevers, chills, sweats, urinary frequency, dysuria, abdominal pain.  Endorses hematuria 
  
Interval history / Subjective:  
 
Patient is status post cystoscopy. No acute complaint. Assessment & Plan:  
 
Acute encephalopathy, 
-likely metabolic encephalopathy from infection, back to baseline Bacteremia 
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 
-Blood cultures continue to be positive 
-ID following  
-Antibiotic changed from Zosyn to meropenem  Chronic systolic heart failure EF 15%, s/p LVAD. -Advanced heart failure cardiology team on board -ECHO done EF <15% Acute renal failure 
-creatinine 1.88 on admission  
-Trending down and stable creatinine Paroxysmal atrial fibrillation  
-on digoxin 
-Monitor History of hematuria and anemia 
-Coumadin discontinued on previous hospitalization due to same 
-Urine cytology suspicious for high-grade urothelial carcinoma 
-Status post cystoscopy with finding of bladder tumor on the posterior wall, status post resection 
-urology following Anemia of chronic disease and Iron deficiency  
-Status post transfusion of 1 unit PRBCs - Monitor labs - Will transfuse for hgb <7.0  
- Received iron infusion - Nephrology following Hyponatremia - Improved - Monitor labs Seizure Disorder - Continue Keppra BPH 
-Continue Finasteride and Tamsulosin Depression 
-Continue Effexor- XR Code status: DNR  
DVT prophylaxis: SCDs Care Plan discussed with: Patient/Family and Nurse Anticipated Disposition: Home w/Family Anticipated Discharge: 24 hours to 48 hours Hospital Problems  Date Reviewed: 7/23/2020 Codes Class Noted POA Acute encephalopathy ICD-10-CM: G93.40 ICD-9-CM: 348.30  7/17/2020 Unknown Review of Systems: A comprehensive review of systems was negative except for that written in the HPI. Vital Signs:  
 Last 24hrs VS reviewed since prior progress note. Most recent are: 
Visit Vitals /84 Pulse 84 Temp 98.3 °F (36.8 °C) Resp 18 Ht 6' 2\" (1.88 m) Wt 81.6 kg (179 lb 14.3 oz) SpO2 97% BMI 23.10 kg/m² Intake/Output Summary (Last 24 hours) at 7/27/2020 1317 Last data filed at 7/27/2020 8295 Gross per 24 hour Intake 470 ml Output 1900 ml Net -1430 ml Physical Examination:  
 
 
     
Constitutional:  No acute distress, cooperative, pleasant, answers questions appropriately, appears stated age ENT:  Oral mucosa moist, oropharynx benign. Resp:  CTA bilaterally. No wheezing/rhonchi/rales. No accessory muscle use CV:  Regular irregular rhythm, normal rate, LVAD in place - Drive line noted to left lower abdomen GI:  Soft, non distended, non tender. , unable to auscultate bowel sounds due to LVAD. Musculoskeletal:  No edema, warm, 2+ pulses throughout Neurologic:  Moves all extremities with generalized weakness. AAOx2-3, CN II-XII reviewed Psych:  Depressive mood Skin:  Good turgor, no rashes or ulcers Data Review:  
 Review and/or order of clinical lab test 
Review and/or order of tests in the radiology section of CPT Review and/or order of tests in the medicine section of CPT Labs:  
 
Recent Labs  
  07/27/20 
0303 07/26/20 
1323 07/26/20 
0331 WBC 7.3  --  6.1 HGB 7.7* 7.6* 7.4* HCT 25.0*  --  24.2*  
  --  180 Recent Labs  
  07/27/20 0303 07/26/20 
0331 07/25/20 
0310  137 137  
K 4.1 3.9 3.9  105 105 CO2 24 25 25 BUN 21* 23* 30* CREA 1.30 1.27 1.62*  96 95  
CA 8.6 8.7 8.6 MG 2.0 2.0 2.3 PHOS 2.8 3.0  --   
 
Recent Labs  
  07/27/20 0303 07/26/20 
0331 07/25/20 
0310 ALT 17  --  25  
AP 94  --  93  
TBILI 0.6  --  0.3 TP 7.0  --  7.0 ALB 2.8* 2.7* 2.8*  
GLOB 4.2*  --  4.2* No results for input(s): INR, PTP, APTT, INREXT, INREXT in the last 72 hours. No results for input(s): FE, TIBC, PSAT, FERR in the last 72 hours. Lab Results Component Value Date/Time Folate 16.5 01/16/2020 04:57 AM  
  
No results for input(s): PH, PCO2, PO2 in the last 72 hours. No results for input(s): CPK, CKNDX, TROIQ in the last 72 hours. No lab exists for component: CPKMB Lab Results Component Value Date/Time Cholesterol, total 90 10/26/2019 04:09 AM  
 HDL Cholesterol 21 10/26/2019 04:09 AM  
 LDL, calculated 56.2 10/26/2019 04:09 AM  
 Triglyceride 64 10/26/2019 04:09 AM  
 CHOL/HDL Ratio 4.3 10/26/2019 04:09 AM  
 
Lab Results Component Value Date/Time  Glucose (POC) 119 (H) 07/18/2020 09:42 PM  
 Glucose (POC) 124 (H) 07/18/2020 04:45 PM  
 Glucose (POC) 116 (H) 07/18/2020 11:33 AM  
 Glucose (POC) 109 (H) 07/18/2020 06:13 AM  
 Glucose (POC) 99 01/27/2020 11:11 AM  
 
Lab Results Component Value Date/Time Color YELLOW/STRAW 07/21/2020 12:43 PM  
 Appearance CLOUDY (A) 07/21/2020 12:43 PM  
 Specific gravity 1.020 07/21/2020 12:43 PM  
 Specific gravity 1.015 10/31/2019 11:00 AM  
 pH (UA) 6.0 07/21/2020 12:43 PM  
 Protein 300 (A) 07/21/2020 12:43 PM  
 Glucose Negative 07/21/2020 12:43 PM  
 Ketone Negative 07/21/2020 12:43 PM  
 Bilirubin Negative 07/21/2020 12:43 PM  
 Urobilinogen 0.2 07/21/2020 12:43 PM  
 Nitrites Negative 07/21/2020 12:43 PM  
 Leukocyte Esterase MODERATE (A) 07/21/2020 12:43 PM  
 Epithelial cells MODERATE (A) 07/21/2020 12:43 PM  
 Bacteria Negative 07/21/2020 12:43 PM  
 WBC 10-20 07/21/2020 12:43 PM  
 RBC >100 (H) 07/21/2020 12:43 PM  
 
 
 
Medications Reviewed:  
 
Current Facility-Administered Medications Medication Dose Route Frequency  hydrALAZINE (APRESOLINE) tablet 10 mg  10 mg Oral TID  meropenem (MERREM) 500 mg in 0.9% sodium chloride (MBP/ADV) 50 mL  500 mg IntraVENous Q6H  
 hydrALAZINE (APRESOLINE) 20 mg/mL injection 10 mg  10 mg IntraVENous Q4H PRN  
 magnesium oxide (MAG-OX) tablet 400 mg  400 mg Oral BID  epoetin yvonne-epbx (RETACRIT) injection 10,000 Units  10,000 Units SubCUTAneous Q MON, WED & FRI  
 0.9% sodium chloride infusion 250 mL  250 mL IntraVENous PRN  
 calcium polycarbophiL (FIBERCON) tablet 625 mg  1 Tab Oral DAILY  sodium chloride (NS) flush 5-40 mL  5-40 mL IntraVENous Q8H  
 sodium chloride (NS) flush 5-40 mL  5-40 mL IntraVENous PRN  
 acetaminophen (TYLENOL) tablet 650 mg  650 mg Oral Q4H PRN  
 ondansetron (ZOFRAN) injection 4 mg  4 mg IntraVENous Q4H PRN  
 bisacodyL (DULCOLAX) tablet 5 mg  5 mg Oral DAILY PRN  
 digoxin (LANOXIN) tablet 0.125 mg  0.125 mg Oral EVERY OTHER DAY  
  finasteride (PROSCAR) tablet 5 mg  5 mg Oral DAILY  lactobac ac& pc-s.therm-b.anim (TIAN Q/RISAQUAD)  1 Cap Oral DAILY  levETIRAcetam (KEPPRA) tablet 250 mg  250 mg Oral BID  tamsulosin (FLOMAX) capsule 0.8 mg  0.8 mg Oral QHS  venlafaxine-SR (EFFEXOR-XR) capsule 150 mg  150 mg Oral DAILY WITH BREAKFAST  
 
______________________________________________________________________ EXPECTED LENGTH OF STAY: 4d 16h ACTUAL LENGTH OF STAY:          7 Ene Zapata MD

## 2020-07-27 NOTE — PROGRESS NOTES
Problem: Self Care Deficits Care Plan (Adult) Goal: *Acute Goals and Plan of Care (Insert Text) Description:  
FUNCTIONAL STATUS PRIOR TO ADMISSION: Patient was modified independent using a rolling walker for functional mobility. Pt would use W/C for longer distances, to MD appts, etc. Pt lives with attentive and supportive spouse. She has been assisting pt with some switchovers, placing batteries, etc. But stated pt can perform on his own. Recent falls HOME SUPPORT: The patient lived with wife who assisted pt as needed. Pt sponge bathed as he has not yet received a LVAD shower bag Occupational Therapy Goals Initiated 7/27/2020 1. Patient will perform standing ADLs at sink x 5 minutes with least restrictive DME with supervision/set-up within 7 day(s). 2.  Patient will perform upper body dressing and lower body dressing with supervision/set-up within 7 day(s). 3.  Patient will perform bathing with supervision/set-up within 7 day(s). 4.  Patient will perform toilet transfers in bathroom with least restrictive DME with supervision/set-up within 7 day(s). 5.  Patient will perform all aspects of toileting with supervision/set-up within 7 day(s). 7.  Patient will perform switchover PM> batteries with independence within 7 days. Outcome: Progressing Towards Goal 
 
OCCUPATIONAL THERAPY EVALUATION Patient: Walter Bajwa (75 y.o. male) Date: 7/27/2020 Primary Diagnosis: Acute encephalopathy [G93.40] Acute encephalopathy [G93.40] Procedure(s) (LRB): 
CYSTO RETROGRADE PYELOGRAM/  BLADDER BIOPSY/ TURBT (N/A) 4 Days Post-Op Precautions:   Fall(LVAD) ASSESSMENT Based on the objective data described below, the patient presents with generalized weakness, decreased fine motor coordination on R during finger to nose, decreased R LE strength (UEs equal) and risk for falls s/p admission for AMS, POD 4 cysto retrograde pyelogram. Overall, CGA with cues for hand placement with sit to stand transfers, needing cues to square RW off to sink to wash hands post toileting. External A required for management of power line for mobility around room. Hope to progress for discharge home with [facility]. Current Level of Function Impacting Discharge (ADLs/self-care): CGA transfers, CGA to min A ADLs, O2 stable on RA Functional Outcome Measure: The patient scored Total: 40/100 on the Barthel Index outcome measure which is indicative of 60% impaired ability to care for basic self needs/dependency on others; inferred 100% dependency on others for instrumental ADLs. Other factors to consider for discharge: old LVAD Patient will benefit from skilled therapy intervention to address the above noted impairments. PLAN : 
Recommendations and Planned Interventions: self care training, functional mobility training, therapeutic exercise, balance training, therapeutic activities, endurance activities and patient education Frequency/Duration: Patient will be followed by occupational therapy 5 times a week to address goals. Recommendation for discharge: (in order for the patient to meet his/her long term goals) Occupational therapy at least 2 days/week in the home AND ensure assist and/or supervision for safety with LVAD equipment This discharge recommendation: 
Has not yet been discussed the attending provider and/or case management IF patient discharges home will need the following DME: none SUBJECTIVE:  
Patient stated I have just been weak.  OBJECTIVE DATA SUMMARY:  
HISTORY:  
Past Medical History:  
Diagnosis Date  BPH (benign prostatic hyperplasia)  [facility]estive heart [facility]) [facility] [address]  Degenerative disc disease, lumbar  Heart failure ([facility] [address]  High cholesterol  Hypertension  Ischemic cardiomyopathy  LVAD (left ventricular assist device) present ([address]  Paroxysmal atrial fibrillation ([facility] [address] 4/2/2019  Spinal stenosis Past Surgical History:  
Procedure Laterality Date  COLONOSCOPY Left 11/11/2019 COLONOSCOPY at bedside performed by Geeta Ly MD at [address]  HX CORONARY ARTERY BYPASS GRAFT    
 triple  HX HEART ASSIST DEVICE Left 10/29/2019 Impella 5.0  
 HX HEART ASSIST DEVICE Left 11/18/2019 HeartMate 3  
 HX HERNIA REPAIR    
 HX IMPLANTABLE CARDIOVERTER DEFIBRILLATOR  HX THROMBECTOMY Left 11/25/2019 Left brachial thrombectomy  MT CARDIOVERSION ELECTIVE ARRHYTHMIA EXTERNAL N/A 6/10/2019 EP CARDIOVERSION performed by Lauren Pacheco MD at [address] CATH LAB  MT CARDIOVERSION ELECTIVE ARRHYTHMIA EXTERNAL N/A 6/18/2019 EP [facility] [address] [address] [facility]  MT INSJ/RPLCMT PERM DFB W/TRNSVNS LDS 1/DUAL CHMBR N/A 6/21/2019 INSERT ICD BIV MULTI [facility] [address] [address] [facility]  MT TCAT IMPL WRLS P-ART PRS SNR L-T HEMODYN MNTR N/A 9/18/2019 IMPLANT HEART FAILURE MONITORING DEVICE performed by Melina Madrid MD at [address] CATH LAB Expanded or extensive additional review of patient history:  
 
Home Situation Home Environment: Private residence # Steps to Enter: 4 Rails to Enter: Yes Hand Rails : (Single railing) One/Two Story Residence: One story Living Alone: No 
Support Systems: Spouse/Significant Other/Partner Patient Expects to be Discharged to[de-identified] Private residence Current DME Used/Available at Home: Sacha Barer, straight, Walker, rolling, Wheelchair Tub or Shower Type: (Sponge-bathing) Hand dominance: Right EXAMINATION OF PERFORMANCE DEFICITS: 
Cognitive/Behavioral Status: 
Neurologic State: Alert Orientation Level: Oriented X4 Cognition: Appropriate decision making; Follows commands Perception: Appears intact Perseveration: No perseveration noted Safety/Judgement: Awareness of environment Skin: intact Edema: none noted Hearing: Auditory Auditory Impairment: None Vision/Perceptual:   
    
    
    
  
    
Acuity: Within Defined Limits Corrective Lenses: Glasses Range of Motion: 
 
AROM: Generally decreased, functional 
  
  
  
  
  
  
  
 
Strength: 
 
Strength: Generally decreased, functional(R LE slightly globally weaker than L LE) Coordination: 
Coordination: Generally decreased, functional(Hx of cerebellar ataxia) Fine Motor Skills-Upper: Right Impaired(mild dysmetria R) Gross Motor Skills-Upper: Left Intact; Right Intact Tone & Sensation: 
 
Tone: Normal 
Sensation: (Patient denied numbness in LEs) Balance: 
Sitting: Intact Standing: Impaired; With support Standing - Static: Good;Constant support Standing - Dynamic : Fair;Constant support Functional Mobility and Transfers for ADLs: 
Bed Mobility: 
Supine to Sit: Supervision; Additional time(Assist with management of power leads) Scooting: Supervision; Additional time Transfers: 
Sit to Stand: Contact guard assistance; Adaptive equipment Stand to Sit: Contact guard assistance; Adaptive equipment Bed to Chair: Contact guard assistance; Adaptive equipment(+ RW) ADL Assessment: 
Feeding: Setup Oral Facial Hygiene/Grooming: Contact guard assistance(cues for safety with RW and standing at sink) Bathing: Minimum assistance Upper Body Dressing: Minimum assistance Lower Body Dressing: Minimum assistance Toileting: Minimum assistance ADL Intervention and task modifications: 
  
 
  
 
  
 
  
 
  
 
  
 
  
 
Cognitive Retraining Safety/Judgement: Awareness of environment Functional Measure: 
Barthel Index: 
 
Bathin Bladder: 0 Bowels: 10 
Groomin Dressin Feedin Mobility: 0 Stairs: 0 Toilet Use: 5 Transfer (Bed to Chair and Back): 10 Total: 40/100 The Barthel ADL Index: Guidelines 1.  The index should be used as a record of what a patient does, not as a record of what a patient could do. 2. The main aim is to establish degree of independence from any help, physical or verbal, however minor and for whatever reason. 3. The need for supervision renders the patient not independent. 4. A patient's performance should be established using the best available evidence. Asking the patient, friends/relatives and nurses are the usual sources, but direct observation and common sense are also important. However direct testing is not needed. 5. Usually the patient's performance over the preceding 24-48 hours is important, but occasionally longer periods will be relevant. 6. Middle categories imply that the patient supplies over 50 per cent of the effort. 7. Use of aids to be independent is allowed. Angeles Vega., Barthel, D.W. (3581). Functional evaluation: the Barthel Index. [address] [facility] (14)2. Yuniel Salvador gabriela CAROLINA Velez, Fadi Marr., Ruthann Dejessu., Marianna Davis, [address] [place] [address] (1999). Measuring the change indisability after inpatient rehabilitation; comparison of the responsiveness of the Barthel Index and Functional [place] Measure. Journal of Neurology, Neurosurgery, and Psychiatry, 66(4), 752-453. CRISTINA Garvey.LEBRON, Tre Haney,  BERNADETTE.GILLIAN, & Sophia Heck, MLILIAN. (2004.) Assessment of post-stroke quality of life in cost-effectiveness studies: The usefulness of the Barthel Index and the EuroQoL-5D. [facility], 13, [phone] Occupational Therapy Evaluation Charge Determination History Examination Decision-Making MEDIUM Complexity : Expanded review of history including physical, cognitive and psychosocial  history  MEDIUM Complexity : 3-5 performance deficits relating to physical, cognitive , or psychosocial skils that result in activity limitations and / or participation restrictions MEDIUM Complexity : Patient may present with comorbidities that affect occupational performnce.  Miniml to moderate modification of tasks or assistance (eg, physical or verbal ) with assesment(s) is necessary to enable patient to complete evaluation Based on the above components, the patient evaluation is determined to be of the following complexity level: LOW Pain Rating: 
none Activity Tolerance:  
Good Please refer to the flowsheet for vital signs taken during this treatment. After treatment patient left in no apparent distress:   
Sitting in chair, Call bell within reach and Caregiver / family present COMMUNICATION/EDUCATION:  
The patients plan of care was discussed with: Physical therapist and Registered nurse. Patient/family have participated as able in goal setting and plan of care. This patients plan of care is appropriate for delegation to [facility]. Thank you for this referral. 
Cristhian Villanueva OT Time Calculation: 30 mins

## 2020-07-27 NOTE — PROGRESS NOTES
Nephrology Progress Note [address] Date of Admission : 7/17/2020 CC: Follow up for JUAN J on CKD Assessment and Plan JUAN J on CKD 
- 2/2 volume depletion--> resolved - U/S neg for hydronephrosis but has gross hematuria now - Cr stable 
- diurese PRN 
- daily labs HFrEF: 
- EF 16-20%, NYHA Class IV , hx of VF arrest - s/p AICD 
- s/p LVAD placement on 11/18 
  
CKD Stage III  
- Mild Left renal atrophy at baseline  
- baseline Cr around 1.2 to 1.4 BPH w/ hx of urinary retention: 
- bladder scan and SC if residual > 500 
- on flomax and proscar 
  
Bladder tumor: 
- cysto and resection on 7/23 
- per urology PAF s/p DCCV 6/19 
  
Anemia: 
- IV x 3 doses 
- on PAVEL Pseudomonas bacteremia: 
- on meropenem  
- per ID Interval History: 
Seen and examined. Cr stable today. On RA, breathing comfortably. No cp, n/v/d reported. Current Medications: all current  Medications have been eviewed in [facility] Review of Systems: Pertinent items are noted in HPI. Objective: 
Vitals:   
Vitals:  
 07/27/20 9351 07/27/20 7482 07/27/20 0756 07/27/20 1107 BP:      
Pulse: 88  91 84 Resp: 22  20 18 Temp: 98.9 °F (37.2 °C)  98.4 °F (36.9 °C) 98.3 °F (36.8 °C) SpO2: 97%  98% 97% Weight:  81.6 kg (179 lb 14.3 oz) Height:      
 
Intake and Output: 
No intake/output data recorded. 07/25 1901 - 07/27 0700 In: 240 [P.O.:240] Out: 4500 [Urine:4500] Physical Examination: 
Pt intubated     No 
General: NAD,Conversant Neck:  Supple, no mass Resp:  Lungs CTA B/L, no wheezing , normal respiratory effort CV:  RRR,  + VAD sounds, no LE edema GI:  Soft, NT, + Bowel sounds, no hepatosplenomegaly Neurologic:  Non focal 
Psych:             AAO x 3 appropriate affect Skin:  No Rash :  No neal []    High complexity decision making was performed 
[]    Patient is at high-risk of decompensation with multiple organ involvement Lab Data Personally Reviewed: I have reviewed all the pertinent labs, microbiology data and radiology studies during assessment. Recent Labs  
  07/27/20 
0303 07/26/20 
0331 07/25/20 
0310  137 137  
K 4.1 3.9 3.9  105 105 CO2 24 25 25  96 95 BUN 21* 23* 30* CREA 1.30 1.27 1.62* CA 8.6 8.7 8.6 MG 2.0 2.0 2.3 PHOS 2.8 3.0  --   
ALB 2.8* 2.7* 2.8* ALT 17  --  25 Recent Labs  
  07/27/20 
0303 07/26/20 
1323 07/26/20 
0331 07/25/20 
0310 WBC 7.3  --  6.1 7.4 HGB 7.7* 7.6* 7.4* 7.3* HCT 25.0*  --  24.2* 24.3*  
  --  180 202 No results found for: [facility] Lab Results Component Value Date/Time Culture result: NO GROWTH AFTER 19 HOURS 07/26/2020 01:23 PM  
 Culture result: (A) 07/24/2020 03:54 AM  
  PSEUDOMONAS AERUGINOSA GROWING IN 2 OF 4 BOTTLES DRAWN , EACH FROM A DIFFERENT SITE. .. LFA AND RAC Culture result: (A) 07/24/2020 03:54 AM  
  PRELIMINARY RESULT OF GRAM NEGATIVE RODS GROWING IN 2 OF 4 BOTTLES DRAWN CALLED TO AND READ BACK BY KATRIN TIDWELL AT 3020, 7/25/20 DB Culture result: REMAINING BOTTLE(S) HAS/HAVE NO GROWTH SO FAR 07/24/2020 03:54 AM  
 
Recent Results (from the past 24 hour(s)) CULTURE, BLOOD, PAIRED Collection Time: 07/26/20  1:23 PM  
 Specimen: Blood Result Value Ref Range Special Requests: NO SPECIAL REQUESTS Culture result: NO GROWTH AFTER 19 HOURS    
TYPE & SCREEN Collection Time: 07/26/20  1:23 PM  
Result Value Ref Range Crossmatch Expiration 07/29/2020 ABO/Rh(D) O POSITIVE Antibody screen POS Comment PREVIOUSLY IDENTIFIED ANTI c , ANTI E AND NONSPECIFIC ANTIBODY  
 ROYER Poly NEG   
 ROYER IgG NEG   
 ROYER C3b/C3d NEG Antibody ID NO ADDITIONAL ANTIBODIES DETECTED ANTIBODY ELUTED ELUATE-NEGATIVE Unit number [id number] Blood component type  LR Unit division 00 Status of unit ALLOCATED ANTIGEN/ANTIBODY INFO c NEGATIVE, 
E NEGATIVE, Crossmatch result Compatible Unit number [id number] Blood component type RC LR Unit division 00 Status of unit ALLOCATED ANTIGEN/ANTIBODY INFO c NEGATIVE, 
E NEGATIVE, Crossmatch result Compatible HEMOGLOBIN Collection Time: 07/26/20  1:23 PM  
Result Value Ref Range HGB 7.6 (L) 12.1 - 17.0 g/dL DIGOXIN Collection Time: 07/27/20  3:03 AM  
Result Value Ref Range Digoxin level 0.7 (L) 0.90 - 2.00 NG/ML  
CBC W/O DIFF Collection Time: 07/27/20  3:03 AM  
Result Value Ref Range WBC 7.3 4.1 - 11.1 K/uL  
 RBC 2.60 (L) 4.10 - 5.70 M/uL HGB 7.7 (L) 12.1 - 17.0 g/dL HCT 25.0 (L) 36.6 - 50.3 % MCV 96.2 80.0 - 99.0 FL  
 MCH 29.6 26.0 - 34.0 PG  
 MCHC 30.8 30.0 - 36.5 g/dL  
 RDW 17.2 (H) 11.5 - 14.5 % PLATELET 047 109 - 294 K/uL MPV 8.9 8.9 - 12.9 FL  
 NRBC 0.0 0  WBC ABSOLUTE NRBC 0.00 0.00 - 0.01 K/uL LD Collection Time: 07/27/20  3:03 AM  
Result Value Ref Range  85 - 241 U/L  
NT-PRO BNP Collection Time: 07/27/20  3:03 AM  
Result Value Ref Range NT pro-BNP 9,902 (H) <125 PG/ML  
PROCALCITONIN Collection Time: 07/27/20  3:03 AM  
Result Value Ref Range Procalcitonin 0.29 ng/mL MAGNESIUM Collection Time: 07/27/20  3:03 AM  
Result Value Ref Range Magnesium 2.0 1.6 - 2.4 mg/dL METABOLIC PANEL, COMPREHENSIVE Collection Time: 07/27/20  3:03 AM  
Result Value Ref Range Sodium 136 136 - 145 mmol/L Potassium 4.1 3.5 - 5.1 mmol/L Chloride 104 97 - 108 mmol/L  
 CO2 24 21 - 32 mmol/L Anion gap 8 5 - 15 mmol/L Glucose 100 65 - 100 mg/dL BUN 21 (H) 6 - 20 MG/DL Creatinine 1.30 0.70 - 1.30 MG/DL  
 BUN/Creatinine ratio 16 12 - 20 GFR est AA >60 >60 ml/min/1.73m2 GFR est non-AA 55 (L) >60 ml/min/1.73m2 Calcium 8.6 8.5 - 10.1 MG/DL Bilirubin, total 0.6 0.2 - 1.0 MG/DL  
 ALT (SGPT) 17 12 - 78 U/L  
 AST (SGOT) 11 (L) 15 - 37 U/L Alk.  phosphatase 94 45 - 117 U/L  
 Protein, total 7.0 6.4 - 8.2 g/dL Albumin 2.8 (L) 3.5 - 5.0 g/dL Globulin 4.2 (H) 2.0 - 4.0 g/dL A-G Ratio 0.7 (L) 1.1 - 2.2 PHOSPHORUS Collection Time: 07/27/20  3:03 AM  
Result Value Ref Range Phosphorus 2.8 2.6 - 4.7 MG/DL  
SAMPLES BEING HELD Collection Time: 07/27/20  3:03 AM  
Result Value Ref Range SAMPLES BEING HELD 1PST COMMENT Add-on orders for these samples will be processed based on acceptable specimen integrity and analyte stability, which may vary by analyte. Tamika Mccall MD 
[address] [place] [address] A [facility] Phone - [phone] Fax - [phone] 
[email or web address]

## 2020-07-27 NOTE — PROGRESS NOTES
1530: Bedside shift change report given to Dustin Henriquez RN, (oncoming nurse) by Drew Solorzano (offgoing nurse). Report included the following information SBAR, Kardex, Intake/Output, MAR, Accordion, Recent Results and Cardiac Rhythm paced. 1930: Bedside shift change report given to Zeinab Dunne RN, (oncoming nurse) by Dustin Henriquez RN, (offgoing nurse). Report included the following information SBAR, Kardex, Intake/Output, MAR, Accordion, Recent Results and Cardiac Rhythm paced.

## 2020-07-27 NOTE — PROGRESS NOTES
Problem: Falls - Risk of 
Goal: *Absence of Falls Description: Document Sondrasundar HernandezParkman Fall Risk and appropriate interventions in the flowsheet. Outcome: Progressing Towards Goal 
Note: Fall Risk Interventions: 
Mobility Interventions: Communicate number of staff needed for ambulation/transfer, Patient to call before getting OOB Medication Interventions: Patient to call before getting OOB, Evaluate medications/consider consulting pharmacy Elimination Interventions: Patient to call for help with toileting needs Problem: LVAD: Discharge Outcomes Goal: *Lungs clear or at baseline Outcome: Progressing Towards Goal 
Goal: *Tolerating diet Outcome: Progressing Towards Goal 
Goal: *LVAD parameters within set limits Outcome: Progressing Towards Goal 
Goal: *LVAD drive line site without signs and symptoms of wound complications Outcome: Progressing Towards Goal 
  
Problem: Pressure Injury - Risk of 
Goal: *Prevention of pressure injury Description: Document Mich Scale and appropriate interventions in the flowsheet. Outcome: Progressing Towards Goal 
Note: Pressure Injury Interventions: Activity Interventions: Increase time out of bed, Pressure redistribution bed/mattress(bed type), PT/OT evaluation Mobility Interventions: Pressure redistribution bed/mattress (bed type), PT/OT evaluation Nutrition Interventions: Document food/fluid/supplement intake Friction and Shear Interventions: HOB 30 degrees or less, Minimize layers Problem: Nutrition Deficit Goal: *Optimize nutritional status Outcome: Progressing Towards Goal

## 2020-07-27 NOTE — PROGRESS NOTES
[address] [facility] [place] [address] [facility] in [place], [place] Inpatient Progress Note Patient name: Jose Vergara Patient : 1950 Patient MRN: [id number] Attending MD: Seth Moran MD 
Date of service: 20 REASON FOR CONSULT: 
UTI in patient with LVAD PROCEDURE PERFORMED:  Cystoscopy, transurethral resection of large bladder tumor.  
POD 4 
 
24hr Events: 
Disoriented this morning NTproBNP 9902 MAPs 6-110mmHg Multiple PI events upon VAD interrogation Hematuria w/ clots - manual Lizama irrigation PLAN: 
LVAD device high speed at 6000rpm and low speed at 5600rpm 
Cannot tolerate beta-blockers d/t severe RV failure Cannot tolerate ACE/ARB/ARNi due to persistent orthostasis despite increased fluid intake  
Cannot tolerate spironolactone due to IVVD and hyponatremia Continue digoxin 0.125 every other day, level 0.7; trend levels daily Monitor daily CardioMEMs readings, 16mmHg (20) Start hydralazine 10mg po TID, keep MAPs 70-90mmHg LVEDD > 7cm; consider RHC to determine device speed Unable to tolerate anticoagulation due to hematuria; stable LDH off aspirin UA (20) large blood, RBC >100, mod leukocytes Urine cytology + high-grade urothelial carcinoma Diagnostic cystoscopy and TURBT (20) -  resection of large papillary and solid bladder tumor on posterior wall Nephrology consult appreciated 
bladder and renal US (20) No evidence for hydronephrosis or mass lesion, small left kidney Urology consult appreciated Manual bladder irrigation prn, gross hematuria w/ clots D/C IV zosyn (20) and changed to IV merrem per ID  
ID consult appreciated Blood cultures (20) Pseudomonas in 2/4 bottles Blood cultures (20) Pseudomonas 2/4 bottles Blood cultures (20) Pseudomonas 2/4 bottles Blood cultures (20) Pseudomonas 2/4 bottles Wound culture (20) light staph aureus Gentamicin to Drive line exit site with daily dressing changes Will eventually need PICC placed for long term IV ABX, waiting for negative blood cultures Elevated sed rate- 109 (7/22/20) Continue soren Q po BID Fiber con 625 po daily Nutrition consult, appreciate recs Continue keppra,  Level 15.5 (7/18/20) D/C IV benadryl due to disorientation/confusion Hospitalist assistance appreciated DNR Daily weights, regular cardiac diet, strict I/O Trend CBC, CMP, NTproBNP, Mag, LD, procalcitonin, digoxin Remain in CVSU 
  
IMPRESSION: 
Bladder Carcinoma Gross Hematuria UTI Metabolic encephalopathy Chronic systolic heart failure Stage D, NYHA class II symptoms Coronary artery disease Ischemic cardiomyopathy, LVEF 15% S/p HM3 LVAD as destination therapy (11/18/19 by Dr. Elina Chen) S/p Impella 5.0 as bridge to LVAD 
HTN, goal MAP 70-90mmHg H/o VT s/p [facility] BIV-ICD PAF 
H/o recurrent UTI, pseudomonal infection Unable to tolerate AC due to hematuria MSSA drive line infection COPD 
JOSE Essential tremor on keppra Depression on effexor Persistently elevated CEA PET scan increased RML activity, not malignant per hemonc Orthostatic hypotension DNR Up-to-date with PNA vaccine - per PCP/wife 
2/4bottles blood cultures +pseudomonas (7/24/20) diarrhea Patient seen and examined. Data and note reviewed. I have discussed and agree with the plans as noted. 71year old male with a history of ICM s/p LVAD as DT who was admitted with Pseudomonas bacteremia. He was found to have urine cytology positive for carcinoma, which prompted a cystoscopy. He underwent transurethral resection of large bladder tumor. Over the weekend, he has continued with gross hematuria. His blood cultures have not cleared on IV zosyn - his antibiotic regimen was changed to IV merrem. Repeat blood cultures from 7/26 - NGTD. Patient received a dose of IV benadryl this morning and was confused during hospital rounds. Thank you for allowing us to participate in your patient's care. Mounika Soriano MD, Chloé Gauthier Chief of Cardiology, [facility] Medical Director [facility] [address] [facility], [address] [facility], [address] [place] [address] Office [phone] Fax [phone] CARDIAC IMAGING: 
TTE 4/20 shows large LVIDd, 6.84 cm, RVIDd 3.06 cm, TAPSE 1.15 cm, severely elevated CVP 
TTE 7/6/20- Mild AI, LViDd 6.91cm, RVIDd 5.16cm, TAPSE 1.39cm Echo (7/18/20) · Contrast used: DEFINITY. · Image quality for this study was technically difficult. · Severely dilated left ventricle. Wall thickness appears thin. Severe systolic function. Estimated left ventricular ejection fraction is <15%. Visually measured ejection fraction. · Moderately dilated left atrium. · Moderately dilated right ventricle. Moderately to severely reduced systolic function. · Dilated right atrium. · Moderate mitral valve prolapse. 
  
 
LVAD INTERROGATION: 
Device interrogated in person, Increased Speed to 6000rpm, low speed to 5600rpm 
Device function normal, normal flow, multiple PI events LVAD Pump Speed (RPM): 6000 Pump Flow (LPM): 5 MAP: 110 PI (Pulsitility Index): 3.8 Power: 4.7  Test: Yes 
Back Up  at Bedside & Labeled: Yes Power Module Test: Yes Driveline Site Care: No 
Driveline Dressing: Clean, Dry, and Intact Outpatient: No 
MAP in Therapeutic Range (Outpatient): No 
Testing Alarms Reviewed: Yes 
Back up SC speed: 6000 Back up Low Speed Limit: 5600 Emergency Equipment with Patient?: Yes Emergency procedures reviewed?: Yes Drive line site inspected?: Yes Drive line intergrity inspected?: Yes Drive line dressing changed?: No 
 
PHYSICAL EXAM: 
Visit Vitals /84 Pulse 84 Temp 98.3 °F (36.8 °C) Resp 18 Ht 6' 2\" (1.88 m) Wt 179 lb 14.3 oz (81.6 kg) SpO2 97% BMI 23.10 kg/m² Review of Systems Constitutional: Positive for malaise/fatigue. Negative for chills, fever and weight loss. Did not sleep well last night HENT: Positive for hearing loss. Eyes: Negative. Respiratory: Positive for shortness of breath. Improved Cardiovascular: Positive for leg swelling. Negative for chest pain, palpitations and orthopnea. Gastrointestinal: Negative. Genitourinary: Positive for hematuria. Negative for dysuria, flank pain and urgency. Musculoskeletal: Negative. Skin: Negative. Neurological: Positive for weakness. Endo/Heme/Allergies: Bruises/bleeds easily. Psychiatric/Behavioral: Positive for depression. Physical Exam 
Vitals signs and nursing note reviewed. Constitutional:   
   General: He is not in acute distress. Appearance: He is ill-appearing. HENT:  
   Head: Normocephalic. Mouth/Throat:  
   Mouth: Mucous membranes are moist.  
Neck:  
   Vascular: No JVD. Cardiovascular:  
   Rate and Rhythm: Normal rate and regular rhythm. Heart sounds: Murmur present. Comments: +lvad hum Pulmonary:  
   Effort: Pulmonary effort is normal.  
   Breath sounds: Normal breath sounds. Abdominal:  
   General: Bowel sounds are normal.  
   Palpations: Abdomen is soft. Genitourinary: 
   Comments: Lizama catheter w/ hematuria and clots Musculoskeletal:  
   Right lower leg: Edema present. Left lower leg: Edema present. Skin: 
   General: Skin is warm and dry. Capillary Refill: Capillary refill takes 2 to 3 seconds. Comments: Scattered bruising on extremities Neurological:  
   Mental Status: He is easily aroused. He is disoriented. Motor: Weakness present. Psychiatric:     
   Speech: Speech normal.     
   Thought Content: Thought content normal.  
   Comments: Moaning PAST MEDICAL HISTORY: 
Past Medical History:  
Diagnosis Date  BPH (benign prostatic hyperplasia)  [facility]F [facility]estive heart failure) [facility] [address] [facility]  Degenerative disc disease, lumbar  Heart failure ([facility] [address]  High cholesterol  Hypertension  Ischemic cardiomyopathy  LVAD (left ventricular assist device) present ([facility] [address]  Paroxysmal atrial fibrillation ([facility] [address] 2019  Spinal stenosis PAST SURGICAL HISTORY: 
Past Surgical History:  
Procedure Laterality Date  COLONOSCOPY Left 2019 COLONOSCOPY at bedside performed by Chapin Marvin MD at [address]  HX CORONARY ARTERY BYPASS GRAFT    
 triple  HX HEART ASSIST DEVICE Left 10/29/2019 Impella 5.0  
 HX HEART ASSIST DEVICE Left 2019 HeartMate 3  
 HX HERNIA REPAIR    
 HX IMPLANTABLE CARDIOVERTER DEFIBRILLATOR  HX THROMBECTOMY Left 2019 Left brachial thrombectomy  TN CARDIOVERSION ELECTIVE ARRHYTHMIA EXTERNAL N/A 6/10/2019 EP CARDIOVERSION performed by Eliana Nice MD at [address] CATH LAB  TN CARDIOVERSION ELECTIVE ARRHYTHMIA EXTERNAL N/A 2019 EP CARDIOVERSION performed by Jeanna Perera MD at [address] CATH LAB  TN INSJ/RPLCMT PERM DFB W/TRNSVNS LDS 1/DUAL CHMBR N/A 2019 INSERT ICD BIV MULTI performed by Joyce Pierson MD at [address] CATH LAB  TN TCAT IMPL WRLS P-ART PRS SNR L-T HEMODYN MNTR N/A 2019 IMPLANT HEART FAILURE MONITORING DEVICE performed by Serjio Bahena MD at [address] CATH LAB FAMILY HISTORY: 
Family History Problem Relation Age of Onset  Lung Disease Mother  Hypertension Mother [facility] Arthritis-osteo Mother  Heart Disease Mother  Heart Disease Father  Diabetes Father SOCIAL HISTORY: 
Social History Socioeconomic History  Marital status:  Spouse name: Not on file  Number of children: Not on file  Years of education: Not on file  Highest education level: Not on file Tobacco Use  Smoking status: Former Smoker Last attempt to quit: 2010 Years since quittin.6  Smokeless tobacco: Never Used Substance and Sexual Activity  Alcohol use: Not Currently Comment: rarely  Drug use: Never Other Topics Concern LABORATORY RESULTS: 
  
Labs Latest Ref Rng & Units 7/27/2020 7/26/2020 7/26/2020 7/25/2020 7/24/2020 7/23/2020 7/22/2020 WBC 4.1 - 11.1 K/uL 7.3 - 6.1 7.4 6.9 6.4 6.2 RBC 4.10 - 5.70 M/uL 2.60(L) - 2.47(L) 2.47(L) 2.58(L) 2.41(L) 2.46(L) Hemoglobin 12.1 - 17.0 g/dL 7. 7(L) 7. 6(L) 7. 4(L) 7. 3(L) 7. 7(L) 7. 2(L) 7. 2(L) Hematocrit 36.6 - 50.3 % 25. 0(L) - 24. 2(L) 24. 3(L) 24. 7(L) 23. 7(L) 24. 0(L) MCV 80.0 - 99.0 FL 96.2 - 98.0 98.4 95.7 98.3 97.6 Platelets 257 - 855 K/uL 170 - 180 202 191 201 169 Lymphocytes 12 - 49 % - - - 11(L) - 10(L) - Monocytes 5 - 13 % - - - 6 - 7 - Eosinophils 0 - 7 % - - - 2 - 3 - Basophils 0 - 1 % - - - 1 - 1 - Albumin 3.5 - 5.0 g/dL 2. 8(L) - 2. 7(L) 2. 8(L) 2. 8(L) 2. 9(L) 3.4(L) Calcium 8.5 - 10.1 MG/DL 8.6 - 8.7 8.6 8.5 8.4(L) 8.7 Glucose 65 - 100 mg/dL 100 - 96 95 110(H) 87 121(H) BUN 6 - 20 MG/DL 21(H) - 23(H) 30(H) 29(H) 26(H) 24(H) Creatinine 0.70 - 1.30 MG/DL 1.30 - 1.27 1.62(H) 1.62(H) 1.68(H) 1.70(H) Sodium 136 - 145 mmol/L 136 - 137 137 135(L) 135(L) 137 Potassium 3.5 - 5.1 mmol/L 4.1 - 3.9 3.9 4.4 4.0 4.2 TSH 0.36 - 3.74 uIU/mL - - - - - - -  
PSA 0.01 - 4.0 ng/mL - - - - - - -  
LDH 85 - 241 U/L 167 - 166 204 171 192 212 Some recent data might be hidden Lab Results Component Value Date/Time TSH 1.13 07/18/2020 11:28 AM  
 TSH 1.70 04/21/2020 01:59 PM  
 TSH 2.30 12/03/2019 03:29 AM  
 TSH 2.40 10/25/2019 07:39 PM  
 TSH 2.45 06/01/2019 04:16 AM  
 
 
ALLERGY: 
Allergies Allergen Reactions  Cefepime Other (comments)  
  myoclonus CURRENT MEDICATIONS: 
 
Current Facility-Administered Medications:  
  hydrALAZINE (APRESOLINE) tablet 10 mg, 10 mg, Oral, TID, Alek Maldonado NP 
  meropenem (MERREM) 500 mg in 0.9% sodium chloride (MBP/ADV) 50 mL, 500 mg, IntraVENous, Q6H, Geoff Godfrey MD, Last Rate: 100 mL/hr at 07/27/20 0948, 500 mg at 07/27/20 1860   hydrALAZINE (APRESOLINE) 20 mg/mL injection 10 mg, 10 mg, IntraVENous, Q4H PRN, Dylan Croissant, NP, 10 mg at 07/27/20 3456   magnesium oxide (MAG-OX) tablet 400 mg, 400 mg, Oral, BID, Dylan Croissant, NP, 400 mg at 07/27/20 1075   epoetin yvonne-epbx (RETACRIT) injection 10,000 Units, 10,000 Units, SubCUTAneous, Q MON, WED & FRI, John Villalpando MD, 10,000 Units at 07/24/20 2233 
  0.9% sodium chloride infusion 250 mL, 250 mL, IntraVENous, PRN, Levi, Shana B, NP 
  calcium polycarbophiL (FIBERCON) tablet 625 mg, 1 Tab, Oral, DAILY, Dylan Croissant E, NP, 625 mg at 07/27/20 0947 
  sodium chloride (NS) flush 5-40 mL, 5-40 mL, IntraVENous, Q8H, Pavel Hyun M, DO, 10 mL at 07/27/20 2445   sodium chloride (NS) flush 5-40 mL, 5-40 mL, IntraVENous, PRN, Tura Adama, [employer] M, DO 
  acetaminophen (TYLENOL) tablet 650 mg, 650 mg, Oral, Q4H PRN, Alphia  M, DO, 650 mg at 07/27/20 0947 
  ondansetron (ZOFRAN) injection 4 mg, 4 mg, IntraVENous, Q4H PRN, Alphia  M, DO, 4 mg at 07/22/20 6411   bisacodyL (DULCOLAX) tablet 5 mg, 5 mg, Oral, DAILY PRN, Rickia  M, DO 
  digoxin (LANOXIN) tablet 0.125 mg, 0.125 mg, Oral, EVERY OTHER DAY, Alphia  M, DO, 0.125 mg at 07/26/20 7527   finasteride (PROSCAR) tablet 5 mg, 5 mg, Oral, DAILY, Zhao, Hyun M, DO, 5 mg at 07/27/20 2583 
  lactobac ac& pc-s.therm-b.anim (TIAN Martinez), 1 Cap, Oral, DAILY, Nell Goyal, DO, 1 Cap at 07/27/20 9460   levETIRAcetam (KEPPRA) tablet 250 mg, 250 mg, Oral, BID, Magalis Farmer M, DO, 250 mg at 07/27/20 0476   tamsulosin (FLOMAX) capsule 0.8 mg, 0.8 mg, Oral, QHS, Hyun Zhao, DO, 0.8 mg at 07/26/20 2322 
  venlafaxine-SR (EFFEXOR-XR) capsule 150 mg, 150 mg, Oral, DAILY WITH BREAKFAST, Hyun Zhao, DO, 150 mg at 07/27/20 1728 Thank you for allowing me to participate in this patient's care. TIERRA Harp [address], [facility] [address] Phone: [phone] Fax: [phone]

## 2020-07-27 NOTE — CONSULTS
Requesting Provider: Soledad Cortés MD - Reason for Consultation: \"gross hematuria\" Pre-existing [facility] Patient:   Yes Patient: Cayden Lemus MRN: [id number]  SSN: [id number] YOB: 1950  Age: 71 y.o. Sex: male Location: [facility]/ Code Status: DNR  
PCP: Link Webster MD  - None Emergency Contact:  Primary Emergency Contact: Ed Lou, Home Phone: [phone] Race/Confucianist/Language: WHITE OR  / Jehovah's witness / Speaks ENGLISH Payor: Payor: Kurt Pratt / Plan: [place] MEDICARE PART A & B / Product Type: Medicare /   
Prior Admission Data: 7/8/20 [facility] 4 CV SERVICES UNIT Arnetta Alpers Hospitalized:  Hospital Day: 11 - Admitted 7/17/2020 12:22 PM  
POD # 4 Days Post-Op Procedure(s): 
CYSTO RETROGRADE PYELOGRAM/  BLADDER BIOPSY/ TURBT by Peyman Eller MD - Blood Loss: * No values recorded between 7/23/2020  2:28 PM and 7/23/2020  3:22 PM * 1 Hr 23 Min 39 Sec [employer]ULT[employer]S 
IP CONSULT TO CARDIOLOGY 
IP CONSULT TO NEPHROLOGY 
IP CONSULT TO UROLOGY 
IP CONSULT TO INFECTIOUS DISEASES 
 ADMISSION DIAGNOSES 
  ICD-10-CM ICD-9-CM 1. Acute cystitis without hematuria  N30.00 595.0 2. Delirium  R41.0 780.09  
3. Acute encephalopathy  G93.40 348.30 4. LVAD (left ventricular assist device) present ([facility]  Z95.811 V43.21  
5. Paroxysmal atrial fibrillation (HCC)  I48.0 427.31  
6. Urinary tract infection without hematuria, site unspecified  N39.0 599.0 7. Acute on chronic systolic CHF (congestive heart failure) (HCC)  I50.23 428.23  
  428.0 8. Chronic anticoagulation  Z79.01 V58.61  
9. Systolic CHF, chronic (HCC)  I50.22 428.22  
  428.0 10. Acute renal failure, unspecified acute renal failure type ([facility]  N17.9 584.9 11. Bacteremia due to Pseudomonas  R78.81 790.7 B96.5 041.7 12. Acute kidney injury superimposed on chronic kidney disease ([facility]  N17.9 866.00  
 N18.9 585.9 13. Ischemic cardiomyopathy  I25.5 414.8 14. Carcinoma of urinary organ (HCC)  C68.9 189.9 15. Bladder carcinoma (HCC)  C67.9 188.9 16. Gross hematuria  R31.0 599.71 Assessment/Plan: · Gross hematuria · Blader wall thickening on CT · Large papillary and solid bladder tumor on posterior wall · S/p cystoscopy, TURBT 2020 
 
-Manually irrigate neal for gross hematuria, clot retention now and PRN. -Monitor Hgb 
-Continue finasteride/flomax 
-OP FU w/ Dr. Bill Rosales to discuss pathology, when available? CC: Altered mental status and Dysarthria HPI: He is a 71 y.o. male w/ PMHx of HF, LVEF 15%, on LVAD, paroxysmal A fib, gross hematuria, large papillary and sessile bladder tumor of posterior wall, s/p TURBT on 2020 w/ Dr. Tracy Gleason. Urology re-consulted for cc of gross hematuria. Neal in place, no CBI. Urine is clear to light pink, no clots. Manually irrigated, no resistance, no clots, in=out. Afebrile. WBC wnl. Merrem+. Hgb 7.7, stable. Cr wnl. BCX+ Problem: hematuria; Location: bladder; Quality:gross, Severity: mild; Timing:on going, Context: as above in HPI; Better/Worse: manual irrigation, Associated s/s:none Temp (24hrs), Av.5 °F (36.9 °C), Min:98.2 °F (36.8 °C), Max:98.9 °F (37.2 °C) Urinary Status: Neal, Patent Creatinine Date/Time Value Ref Range Status 2020 03:03 AM 1.30 0.70 - 1.30 MG/DL Final  
2020 03:31 AM 1.27 0.70 - 1.30 MG/DL Final  
2020 03:10 AM 1.62 (H) 0.70 - 1.30 MG/DL Final  
2020 04:02 AM 1.62 (H) 0.70 - 1.30 MG/DL Final  
2020 03:25 AM 1.68 (H) 0.70 - 1.30 MG/DL Final  
 
Current Antimicrobial Therapy (168h ago, onward) Ordered     Start Stop  
 20 1315  meropenem (MERREM) 500 mg in 0.9% sodium chloride (MBP/ADV) 50 mL  500 mg,   IntraVENous,   EVERY 6 HOURS    
 20 1400 --  
  
 
Key Anti-Platelet Anticoagulant Meds The patient is on no antiplatelet meds or anticoagulants. Diet: DIET SNACKS AM Snack, PM Snack DIET REGULAR 3-4 GM (JOSÉ LUIS); High Fiber - % Diet Eaten: 75 % Labs Lab Results Component Value Date/Time Lactic acid 0.7 07/06/2020 07:22 AM  
 Lactic acid 0.7 04/24/2020 04:44 AM  
 Lactic acid 0.7 04/23/2020 03:20 AM  
 WBC 7.3 07/27/2020 03:03 AM  
 HCT 25.0 (L) 07/27/2020 03:03 AM  
 PLATELET 759 58/70/8460 03:03 AM  
 Sodium 136 07/27/2020 03:03 AM  
 Potassium 4.1 07/27/2020 03:03 AM  
 Chloride 104 07/27/2020 03:03 AM  
 CO2 24 07/27/2020 03:03 AM  
 BUN 21 (H) 07/27/2020 03:03 AM  
 Creatinine 1.30 07/27/2020 03:03 AM  
 Glucose 100 07/27/2020 03:03 AM  
 Calcium 8.6 07/27/2020 03:03 AM  
 Magnesium 2.0 07/27/2020 03:03 AM  
 INR 1.1 07/17/2020 11:51 AM  
 Prostate Specific Ag 0.4 07/05/2020 03:09 AM  
 
UA:  
Lab Results Component Value Date/Time Color YELLOW/STRAW 07/21/2020 12:43 PM  
 Appearance CLOUDY (A) 07/21/2020 12:43 PM  
 Specific gravity 1.020 07/21/2020 12:43 PM  
 Specific gravity 1.015 10/31/2019 11:00 AM  
 pH (UA) 6.0 07/21/2020 12:43 PM  
 Protein 300 (A) 07/21/2020 12:43 PM  
 Glucose Negative 07/21/2020 12:43 PM  
 Ketone Negative 07/21/2020 12:43 PM  
 Bilirubin Negative 07/21/2020 12:43 PM  
 Urobilinogen 0.2 07/21/2020 12:43 PM  
 Nitrites Negative 07/21/2020 12:43 PM  
 Leukocyte Esterase MODERATE (A) 07/21/2020 12:43 PM  
 Epithelial cells MODERATE (A) 07/21/2020 12:43 PM  
 Bacteria Negative 07/21/2020 12:43 PM  
 WBC 10-20 07/21/2020 12:43 PM  
 RBC >100 (H) 07/21/2020 12:43 PM  
 
Imaging Results for orders placed during the hospital encounter of 07/17/20 CT HEAD WO CONT Narrative EXAM:  CT HEAD WO CONT INDICATION: Confusion. COMPARISON: CT 6/3/2020 TECHNIQUE: Noncontrast head CT. Coronal and sagittal reformats. CT dose 
reduction was achieved through use of a standardized protocol tailored for this 
examination and automatic exposure control for dose modulation. Adaptive 
statistical iterative reconstruction (ASIR) was utilized. FINDINGS:  There is diffuse age-related parenchymal volume loss. The ventricles 
and sulci are age-appropriate without hydrocephalus. There is no mass effect or 
midline shift. There is no intracranial hemorrhage or extra-axial fluid 
collection. Scattered foci of low attenuation in the periventricular white 
matter represent stable chronic microvascular ischemic changes. The gray-white 
matter differentiation is maintained. The basal cisterns are patent. The osseous structures are intact. There are aerated secretions in the left 
maxillary sinus. The remaining paranasal sinuses and mastoid air cells are 
clear. Impression IMPRESSION:  
1. No acute intracranial abnormality. 2. Aerated secretions in the left maxillary sinus can be seen with acute 
sinusitis. US Results (most recent): 
Results from [place] encounter on 07/17/20 US RETROPERITONEUM COMP Narrative EXAM:  US RETROPERITONEUM COMP INDICATION:  Renal failure COMPARISON: CT 7/6/2020 TECHNIQUE: 
Real-time sonography of the kidneys, retroperitoneum and bladder was performed 
with multiple static images obtained. FINDINGS: 
The kidneys have mildly increased echogenicity with no mass, stone or 
hydronephrosis. The left kidney is small. The right kidney measures 12.2 cm and 
the left kidney measures 6.4 cm in length. The aorta tapers normally. The proximal iliac arteries are obscured by bowel 
gas and not assessed. The IVC is normal. No retroperitoneal mass is identified. The urinary bladder is normal. 
  
 Impression IMPRESSION: Medical renal disease. Small left kidney. No evidence for 
hydronephrosis or mass lesion. Cultures All Micro Results Procedure Component Value Units Date/Time CULTURE, BLOOD, PAIRED [[id number]] Collected:  07/26/20 1323 Order Status:  Completed Specimen:  Blood Updated:  07/27/20 1029 Special Requests: NO SPECIAL REQUESTS Culture result: NO GROWTH AFTER 19 HOURS     
 CULTURE, BLOOD, PAIRED [[id number]]  (Abnormal)  (Susceptibility) Collected:  07/24/20 0354 Order Status:  Completed Specimen:  Blood Updated:  07/27/20 8341 Special Requests: NO SPECIAL REQUESTS Culture result:    
  PSEUDOMONAS AERUGINOSA GROWING IN 2 OF 4 BOTTLES DRAWN , EACH FROM A DIFFERENT SITE. .. LFA AND RAC PRELIMINARY RESULT OF GRAM NEGATIVE RODS GROWING IN 2 OF 4 BOTTLES DRAWN CALLED TO AND READ BACK BY KATRIN TIDWELL AT 0643, 7/25/20 DB  
      
  REMAINING BOTTLE(S) HAS/HAVE NO GROWTH SO FAR  
     
 CULTURE, BLOOD, PAIRED [[id number]]  (Abnormal) Collected:  07/19/20 7838 Order Status:  Completed Specimen:  Blood Updated:  07/24/20 1004 Special Requests: NO SPECIAL REQUESTS Culture result:    
  PSEUDOMONAS SPECIES GROWING IN 2 OF 4 BOTTLES DRAWN (SITES = R ARM AND LFA) PLEASE REFER TO [id number] FOR ID AND SENSITIVITIES PRELIMINARY REPORT OF GRAM NEGATIVE RODS GROWING IN 2 OF 4 BOTTLES DRAWN (SITES= LFA AND R ARM) CALLED TO AND READ BACK BY DIANA POWELL (Basile) ON 07/20/2020 AT 0700 BY VY  
     
      
  REMAINING BOTTLE(S) HAS/HAVE NO GROWTH IN 5 DAYS  
     
 CULTURE, BLOOD, PAIRED [[id number]]  (Abnormal) Collected:  07/22/20 1026 Order Status:  Completed Specimen:  Blood Updated:  07/24/20 1834 Special Requests: NO SPECIAL REQUESTS Culture result: PROBABLE PSEUDOMONAS SPECIES GROWING IN 2 OF 4 BOTTLES DRAWN (SITES = RW AND L AC) REFER TO [id number] FOR ID AND SENSITIVITIES  
     
      
  REMAINING BOTTLE(S) HAS/HAVE NO GROWTH SO FAR  
     
 CULTURE, BLOOD, PAIRED [[id number]]  (Abnormal)  (Susceptibility) Collected:  07/17/20 1859 Order Status:  Completed Specimen:  Blood Updated:  07/23/20 1101 Special Requests: NO SPECIAL REQUESTS Culture result:    
  PSEUDOMONAS AERUGINOSA GROWING IN 2 OF 4 BOTTLES DRAWN . ..(SITES= RAC AND LAC) PRELIMINARY REPORT OF GRAM NEGATIVE RODS GROWING IN 2 OF 4 BOTTLES DRAWN CALLED TO AND READ BACK BY  MS Flores RN AT 1805 ON 7/18/20. PJ  
     
      
  REMAINING BOTTLE(S) HAS/HAVE NO GROWTH IN 5 DAYS  
     
 CULTURE, Mickeal Speed STAIN [[id number]]  (Abnormal)  (Susceptibility) Collected:  07/20/20 1420 Order Status:  Completed Specimen:  Wound Drainage Updated:  07/23/20 1035 Special Requests: NO SPECIAL REQUESTS     
  GRAM STAIN NO ORGANISMS SEEN Culture result:    
  LIGHT STAPHYLOCOCCUS AUREUS  
     
 CULTURE, URINE [[id number]] Collected:  07/17/20 1151 Order Status:  Completed Specimen:  Urine from Clean catch Updated:  07/18/20 1155 Special Requests: NO SPECIAL REQUESTS Culture result:    
  No significant growth, <10,000 CFU/mL CULTURE, URINE [[id number]] Order Status:  Canceled Specimen:  Urine from Clean catch URINE CULTURE HOLD SAMPLE [[id number]] Collected:  07/17/20 1151 Order Status:  Completed Specimen:  Urine from Serum Updated:  07/17/20 1204 Urine culture hold Urine on hold in Microbiology dept for 2 days. If unpreserved urine is submitted, it cannot be used for addtional testing after 24 hours, recollection will be required. Past History: (Complete 2+/3 areas) Allergies Allergen Reactions  Cefepime Other (comments)  
  myoclonus Current Facility-Administered Medications Medication Dose Route Frequency  hydrALAZINE (APRESOLINE) tablet 10 mg  10 mg Oral TID  meropenem (MERREM) 500 mg in 0.9% sodium chloride (MBP/ADV) 50 mL  500 mg IntraVENous Q6H  
 hydrALAZINE (APRESOLINE) 20 mg/mL injection 10 mg  10 mg IntraVENous Q4H PRN  
 magnesium oxide (MAG-OX) tablet 400 mg  400 mg Oral BID  epoetin yvonne-epbx (RETACRIT) injection 10,000 Units  10,000 Units SubCUTAneous Q MON, WED & FRI  
 0.9% sodium chloride infusion 250 mL  250 mL IntraVENous PRN  
  calcium polycarbophiL (FIBERCON) tablet 625 mg  1 Tab Oral DAILY  sodium chloride (NS) flush 5-40 mL  5-40 mL IntraVENous Q8H  
 sodium chloride (NS) flush 5-40 mL  5-40 mL IntraVENous PRN  
 acetaminophen (TYLENOL) tablet 650 mg  650 mg Oral Q4H PRN  
 diphenhydrAMINE (BENADRYL) injection 12.5 mg  12.5 mg IntraVENous Q4H PRN  
 ondansetron (ZOFRAN) injection 4 mg  4 mg IntraVENous Q4H PRN  
 bisacodyL (DULCOLAX) tablet 5 mg  5 mg Oral DAILY PRN  
 digoxin (LANOXIN) tablet 0.125 mg  0.125 mg Oral EVERY OTHER DAY  finasteride (PROSCAR) tablet 5 mg  5 mg Oral DAILY  lactobac ac& pc-s.therm-b.anim (TIAN Q/RISAQUAD)  1 Cap Oral DAILY  levETIRAcetam (KEPPRA) tablet 250 mg  250 mg Oral BID  tamsulosin (FLOMAX) capsule 0.8 mg  0.8 mg Oral QHS  venlafaxine-SR (EFFEXOR-XR) capsule 150 mg  150 mg Oral DAILY WITH BREAKFAST Prior to Admission medications Medication Sig Start Date End Date Taking? Authorizing Provider  
budesonide (PULMICORT) 0.5 mg/2 mL nbsp 500 mcg by Nebulization route nightly. Yes Provider, Historical  
tadalafiL (CIALIS) 2.5 mg tablet Take 2.5 mg by mouth nightly. Yes Provider, Historical  
digoxin (LANOXIN) 0.125 mg tablet Take 1 Tab by mouth every other day. 5/26/20  Yes Shana Sims NP  
magnesium oxide (MAG-OX) 400 mg tablet Take 2 Tabs by mouth two (2) times a day. 5/22/20  Yes Clint Sims NP  
ciprofloxacin HCl (CIPRO) 750 mg tablet Take 1 Tab by mouth two (2) times a day. 5/22/20  Yes Shana Sims NP  
tamsulosin (Flomax) 0.4 mg capsule Take 0.8 mg by mouth nightly. Yes Provider, Historical  
gentamicin (GARAMYCIN) 0.1 % topical ointment Apply to exit site daily. 3/16/20  Yes Christine Mcnamara NP  
venlafaxine-SR [facility] 150 mg capsule Take 1 Cap by mouth daily (with breakfast). 3/9/20  Yes Lety Herrera, NP  
levETIRAcetam (KEPPRA) 250 mg tablet Take 1 Tab by mouth two (2) times a day.  3/9/20  Yes Christine Mcnamara, NP  
 L. acidoph & paracasei- S therm- Bifido (TIAN-Q/RISAQUAD) 8 billion cell cap cap Take 1 Cap by mouth daily. 3/9/20  Yes Christine Mcnamara NP  
finasteride (PROSCAR) 5 mg tablet Take 1 Tab by mouth daily. 3/9/20  Yes Christine Mcnamara NP  
albuterol (PROVENTIL HFA, VENTOLIN HFA, PROAIR HFA) 90 mcg/actuation inhaler Take 2 Puffs by inhalation every six (6) hours as needed for Wheezing. 2/26/20  Yes Belkis Magaña NP  
  
 
PMHx:  has a past medical history of BPH (benign prostatic hyperplasia), CHF (congestive heart failure) ([facility] Degenerative disc disease, lumbar, Heart failure ([facility] High cholesterol, Hypertension, Ischemic cardiomyopathy, LVAD (left ventricular assist device) present [facility]), Paroxysmal atrial fibrillation ([facility] (4/2/2019), and Spinal stenosis. PSurgHx:  has a past surgical history that includes hx coronary artery bypass graft; hx appendectomy; hx hernia repair; hx implantable cardioverter defibrillator; pr cardioversion elective arrhythmia external (N/A, 6/10/2019); pr cardioversion elective arrhythmia external (N/A, 6/18/2019); pr insj/rplcmt perm dfb w/trnsvns lds 1/dual chmbr (N/A, 6/21/2019); pr tcat impl wrls p-art prs snr l-t hemodyn mntr (N/A, 9/18/2019); colonoscopy (Left, 11/11/2019); hx heart assist device (Left, 10/29/2019); hx heart assist device (Left, 11/18/2019); and hx thrombectomy (Left, 11/25/2019). PSocHx:  reports that he quit smoking about 9 years ago. He has never used smokeless tobacco. He reports previous alcohol use. He reports that he does not use drugs. ROS:  (Complete - 10 systems) - DENIES: Weightloss (Constitutional), Dry mouth (ENMT), Chest pain (CV), SOB (Respiratory), Constipation (GI), Weakness (MS), Pallor (Skin), TIA Sx (Neuro), Confusion (Psych), Easy bruising (Heme) Physical Exam: (Comprehesive - 8+ 1995 Systems)  
 
(1) Constitutional:  FIO2:   on SpO2: O2 Sat (%): 97 % O2 Device: Nasal cannula O2 Flow Rate (L/min): 2 l/min Date 07/26/20 0700 - 07/27/20 8367 07/27/20 0700 - 07/28/20 7265 Shift 3205-5238 7385-6982 24 Hour Total 5924-6311 8075-3350 24 Hour Total  
INTAKE  
P.O. 240  240     
  P. O. 240  240 Shift Total(mL/kg) 240(3)  240(2.9) OUTPUT Urine(mL/kg/hr) 1900(1.9) 750(0.8) 2650(1.4) Urine Voided 625  625 Urine Output (mL) (Urinary Catheter 07/23/20 Lizama) 4598 052 8145 Shift Total(mL/kg) 1485(29.9) 750(9.2) 3089(79.4) NET -6948 -503 -8309 Weight (kg) 81.2 81.6 81.6 81.6 81.6 81.6  
  
(2) ENMT:   moist mucous membranes, normal sinuses (3) Respiratory:  breathing easily, no distress (4) GI:  no abdominal masses, tenderness  
(5) :   Lizama in place, gross hematuria  
(6) Lymphatic:  no adenopathy, neck supple  
(7) Muscloskeletal:  no gross deformity, normal ROM  
(8) Skin:  no rash, warm & dry  
(9) Neuro:  no focal deficits, normal speech Signed By: Roberto Miller NP  - July 27, 2020

## 2020-07-28 NOTE — PROGRESS NOTES
Nephrology Progress Note Walter Bajwa Date of Admission : 7/17/2020 CC: Follow up for JUAN J on CKD Assessment and Plan JUAN J on CKD 
- 2/2 volume depletion--> resolved - U/S neg for hydronephrosis . Gross hematuria resolved - Cr stable 
- diurese PRN 
- daily labs HFrEF: 
- EF 16-20%, NYHA Class IV , hx of VF arrest - s/p AICD 
- s/p LVAD placement on 11/18 
  
CKD Stage III  
- Mild Left renal atrophy at baseline  
- baseline Cr around 1.2 to 1.4 BPH w/ hx of urinary retention: 
- bladder scan and SC if residual > 500 
- on flomax and proscar 
  
Bladder tumor: 
- cysto and resection on 7/23 
- per urology PAF s/p DCCV 6/19 
  
Anemia: 
- IV x 3 doses 
- on PAVEL Pseudomonas bacteremia: 
- on meropenem  
- per ID Interval History: 
Seen and examined. Cr stable today. Hematuria resolved. He had benadryl yesterday morning and was lethargic. Better later in the afternoon and no problems overnight,  On RA, breathing comfortably. No cp, n/v/d reported. Current Medications: all current  Medications have been eviewed in [facility] Review of Systems: Pertinent items are noted in HPI. Objective: 
Vitals:   
Vitals:  
 07/27/20 2030 07/27/20 2325 07/28/20 0321 07/28/20 2931 BP:      
Pulse:  86 82 Resp:  16 18 Temp:  98.1 °F (36.7 °C) 97.4 °F (36.3 °C) SpO2: 96% 97% 99% Weight:    78 kg (171 lb 15.3 oz) Height:      
 
Intake and Output: 
No intake/output data recorded. 07/26 1901 - 07/28 0700 In: 2968 [P.O.:1280; I.V.:400] Out: 2950 [Urine:2950] Physical Examination: 
Pt intubated     No 
General: NAD,Conversant Neck:  Supple, no mass Resp:  Lungs CTA B/L, no wheezing , normal respiratory effort CV:  RRR,  + VAD sounds, no LE edema GI:  Soft, NT, + Bowel sounds, no hepatosplenomegaly Neurologic:  Non focal 
Psych:             AAO x 3 appropriate affect Skin:  No Rash :  No neal []    High complexity decision making was performed []    Patient is at high-risk of decompensation with multiple organ involvement Lab Data Personally Reviewed: I have reviewed all the pertinent labs, microbiology data and radiology studies during assessment. Recent Labs  
  07/28/20 0220 07/27/20 0303 07/26/20 
0331 * 136 137  
K 4.4 4.1 3.9  104 105 CO2 22 24 25 GLU 94 100 96 BUN 24* 21* 23* CREA 1.34* 1.30 1.27  
CA 8.6 8.6 8.7 MG 2.3 2.0 2.0 PHOS  --  2.8 3.0 ALB 2.6* 2.8* 2.7* ALT 16 17  --   
 
Recent Labs  
  07/28/20 0220 07/27/20 0303 07/26/20 
1323 07/26/20 
0331 WBC 5.9 7.3  --  6.1 HGB 7.5* 7.7* 7.6* 7.4* HCT 24.6* 25.0*  --  24.2*  
 170  --  180 No results found for: SDES Lab Results Component Value Date/Time Culture result: (A) 07/26/2020 01:23 PM  
  GRAM NEGATIVE RODS GROWING IN 1 OF 4 BOTTLES DRAWN SITE = ( RAC ) Culture result: (A) 07/26/2020 01:23 PM  
  PRELIMINARY REPORT OF GRAM NEGATIVE RODS GROWING IN 1 OF 4 BOTTLES DRAWN CALLED TO AND READ BACK BY MS ROSALIND WILKES AT 2000 ON 7/27/20. PJ  
 Culture result: REMAINING BOTTLE(S) HAS/HAVE NO GROWTH SO FAR 07/26/2020 01:23 PM  
 
Recent Results (from the past 24 hour(s)) DIGOXIN Collection Time: 07/28/20  2:20 AM  
Result Value Ref Range Digoxin level 0.6 (L) 0.90 - 2.00 NG/ML  
MAGNESIUM Collection Time: 07/28/20  2:20 AM  
Result Value Ref Range Magnesium 2.3 1.6 - 2.4 mg/dL METABOLIC PANEL, COMPREHENSIVE Collection Time: 07/28/20  2:20 AM  
Result Value Ref Range Sodium 135 (L) 136 - 145 mmol/L Potassium 4.4 3.5 - 5.1 mmol/L Chloride 106 97 - 108 mmol/L  
 CO2 22 21 - 32 mmol/L Anion gap 7 5 - 15 mmol/L Glucose 94 65 - 100 mg/dL BUN 24 (H) 6 - 20 MG/DL Creatinine 1.34 (H) 0.70 - 1.30 MG/DL  
 BUN/Creatinine ratio 18 12 - 20 GFR est AA >60 >60 ml/min/1.73m2 GFR est non-AA 53 (L) >60 ml/min/1.73m2 Calcium 8.6 8.5 - 10.1 MG/DL  Bilirubin, total 0.5 0.2 - 1.0 MG/DL  
 ALT (SGPT) 16 12 - 78 U/L  
 AST (SGOT) 13 (L) 15 - 37 U/L Alk. phosphatase 92 45 - 117 U/L Protein, total 7.0 6.4 - 8.2 g/dL Albumin 2.6 (L) 3.5 - 5.0 g/dL Globulin 4.4 (H) 2.0 - 4.0 g/dL A-G Ratio 0.6 (L) 1.1 - 2.2    
CBC W/O DIFF Collection Time: 07/28/20  2:20 AM  
Result Value Ref Range WBC 5.9 4.1 - 11.1 K/uL  
 RBC 2.54 (L) 4.10 - 5.70 M/uL HGB 7.5 (L) 12.1 - 17.0 g/dL HCT 24.6 (L) 36.6 - 50.3 % MCV 96.9 80.0 - 99.0 FL  
 MCH 29.5 26.0 - 34.0 PG  
 MCHC 30.5 30.0 - 36.5 g/dL  
 RDW 17.4 (H) 11.5 - 14.5 % PLATELET 454 910 - 956 K/uL MPV 9.1 8.9 - 12.9 FL  
 NRBC 0.0 0  WBC ABSOLUTE NRBC 0.00 0.00 - 0.01 K/uL LD Collection Time: 07/28/20  2:20 AM  
Result Value Ref Range  85 - 241 U/L  
NT-PRO BNP Collection Time: 07/28/20  2:20 AM  
Result Value Ref Range NT pro-BNP 6,897 (H) <125 PG/ML  
PROCALCITONIN Collection Time: 07/28/20  2:20 AM  
Result Value Ref Range Procalcitonin 0.37 ng/mL SAMPLES BEING HELD Collection Time: 07/28/20  2:25 AM  
Result Value Ref Range SAMPLES BEING HELD 1BLUE   
 COMMENT Add-on orders for these samples will be processed based on acceptable specimen integrity and analyte stability, which may vary by analyte. Melany Gunderson MD 
[address] [place] [address], [address] [facility] Phone - [phone] Fax - [phone] 
[email or web address]

## 2020-07-28 NOTE — PROGRESS NOTES
Problem: Self Care Deficits Care Plan (Adult) Goal: *Acute Goals and Plan of Care (Insert Text) Description:  
FUNCTIONAL STATUS PRIOR TO ADMISSION: Patient was modified independent using a rolling walker for functional mobility. Pt would use W/C for longer distances, to MD appts, etc. Pt lives with attentive and supportive spouse. She has been assisting pt with some switchovers, placing batteries, etc. But stated pt can perform on his own. Recent falls HOME SUPPORT: The patient lived with wife who assisted pt as needed. Pt sponge bathed as he has not yet received a LVAD shower bag Occupational Therapy Goals Initiated 7/27/2020 1. Patient will perform standing ADLs at sink x 5 minutes with least restrictive DME with supervision/set-up within 7 day(s). 2.  Patient will perform upper body dressing and lower body dressing with supervision/set-up within 7 day(s). 3.  Patient will perform bathing with supervision/set-up within 7 day(s). 4.  Patient will perform toilet transfers in bathroom with least restrictive DME with supervision/set-up within 7 day(s). 5.  Patient will perform all aspects of toileting with supervision/set-up within 7 day(s). 7.  Patient will perform switchover PM> batteries with independence within 7 days. Outcome: Progressing Towards Goal 
 
OCCUPATIONAL THERAPY TREATMENT Patient: Prosper Breen (75 y.o. male) Date: 7/28/2020 Diagnosis: Acute encephalopathy [G93.40] Acute encephalopathy [G93.40] <principal problem not specified> Procedure(s) (LRB): 
CYSTO RETROGRADE PYELOGRAM/  BLADDER BIOPSY/ TURBT (N/A) 5 Days Post-Op Precautions: Fall(LVAD) Chart, occupational therapy assessment, plan of care, and goals were reviewed. ASSESSMENT Patient continues with skilled OT services and is progressing towards goals.   Pt continues to require verbal cues for proper hand placement with sit <> stand transfers, A with power line from LVAD and short term memory loss (pt did not recall working with PT less than 1 hour prior to OT's arrival). Donned sneakers via forward flexion with setup, CGA and cues for toilet transfer. O2 stable via room air. Continue to recommend HHOT and PT at discharge. Current Level of Function Impacting Discharge (ADLs): CGA to min A Other factors to consider for discharge: old LVAD, falls at home PLAN : 
Patient continues to benefit from skilled intervention to address the above impairments. Continue treatment per established plan of care. to address goals. Recommend with staff: mobilize TID, chair for meals Recommend next OT session: switchovers, standing endurance for ADLs Recommendation for discharge: (in order for the patient to meet his/her long term goals) Occupational therapy at least 2 days/week in the home AND ensure assist and/or supervision for safety with ADLs This discharge recommendation: 
Has been made in collaboration with the attending provider and/or case management IF patient discharges home will need the following DME: none SUBJECTIVE:  
Patient stated I haven't walked with PT yet.  OBJECTIVE DATA SUMMARY:  
Cognitive/Behavioral Status: 
Neurologic State: Alert Orientation Level: Oriented X4(aware was in hospital, thought he had moved rooms) Cognition: Memory loss Perception: Appears intact Perseveration: No perseveration noted Safety/Judgement: Awareness of environment Functional Mobility and Transfers for ADLs: 
Bed Mobility: 
  
 
Transfers: 
Sit to Stand: Contact guard assistance;Assist x1;Additional time Functional Transfers Toilet Transfer : Contact guard assistance;Assist x1;Additional time Bed to Chair: Contact guard assistance;Assist x1;Additional time; Adaptive equipment(A for LVAD power line) Balance: 
Sitting: Intact; Without support Standing: Impaired; With support Standing - Static: Good Standing - Dynamic : Fair ADL Intervention: Donning shoes in chair via forward flexion Toileting Bowel Hygiene: Modified indpendent Clothing Management: Minimum assistance Cognitive Retraining Safety/Judgement: Awareness of environment Pain: 
none Activity Tolerance:  
Good Please refer to the flowsheet for vital signs taken during this treatment. After treatment patient left in no apparent distress:  
Sitting in chair and Call bell within reach COMMUNICATION/COLLABORATION:  
The patients plan of care was discussed with: Physical therapist and Registered nurse. Shanice Cifuentes OT Time Calculation: 15 mins

## 2020-07-28 NOTE — PROGRESS NOTES
[facility] [facility] Adult  Hospitalist Group Hospitalist Progress Note Becca Khalil MD 
Answering service: [phone] OR [phone] from in house phone Date of Service:  2020 NAME:  Marcos Merlos :  1950 MRN:  [id number] Admission Summary:  
  
71year old male with past medical history heart failure, LVEF 15%, on LVAD, paroxysmal atrial fibrillation, BPH, recent admission for hematuria, presenting to [facility] with progressive and worsening confusion.  Patient seen and examined and currently tangential in thought process.  Discussed with wife Isaias Rutherford, via phone.  Per wife, patient has been progressively confused during the past week.  Reports he has not slept for approximately 3 nights.  At times, patient is coherent but then intermittently confused. [facility] has been doing things out of the ordinary such as calling his children early in the morning or late at night.  Also calling his siblings multiple times.  Per wife, patient is Nondenominational but usually does not speak about God to others. Kalina Killer my encounter, patient continuously spoke about the Mitchael Ebbing in his past.  Patient alert to name, place, situation but not year.  Denies chest pain, shortness of breath, cough, fevers, chills, sweats, urinary frequency, dysuria, abdominal pain.  Endorses hematuria 
  
Interval history / Subjective:  
 
Patient is status post cystoscopy. No acute complaint. Assessment & Plan:  
 
Acute encephalopathy, 
-likely metabolic encephalopathy from infection, back to baseline Bacteremia 
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 
-Blood cultures continue to be positive 
-ID following  
-Antibiotic changed from Zosyn to meropenem  Chronic systolic heart failure EF 15%, s/p LVAD. -Advanced heart failure cardiology team on board -ECHO done EF <15% Acute renal failure 
-creatinine 1.88 on admission  
-Trending down and stable creatinine Paroxysmal atrial fibrillation  
-on digoxin 
-Monitor History of hematuria and anemia 
-Coumadin discontinued on previous hospitalization due to same 
-Urine cytology suspicious for high-grade urothelial carcinoma 
-Status post cystoscopy with finding of bladder tumor on the posterior wall, status post resection 
-urology following Anemia of chronic disease and Iron deficiency  
-Status post transfusion of 1 unit PRBCs - Monitor labs - Will transfuse for hgb <7.0  
- Received iron infusion - Nephrology following Hyponatremia - Improved - Monitor labs Seizure Disorder - Continue Keppra BPH 
-Continue Finasteride and Tamsulosin Depression 
-Continue Effexor- XR Moderate protein calorie malnutrition 
-Consult nutrition Code status: DNR  
DVT prophylaxis: SCDs Care Plan discussed with: Patient/Family and Nurse Anticipated Disposition: Home w/Family Anticipated Discharge: 24 hours to 48 hours Hospital Problems  Date Reviewed: 7/23/2020 Codes Class Noted POA Acute encephalopathy ICD-10-CM: G93.40 ICD-9-CM: 348.30  7/17/2020 Unknown Review of Systems: A comprehensive review of systems was negative except for that written in the HPI. Vital Signs:  
 Last 24hrs VS reviewed since prior progress note. Most recent are: 
Visit Vitals /90 (BP 1 Location: Right arm, BP Patient Position: At rest) Pulse 81 Temp 97.7 °F (36.5 °C) Resp 18 Ht 6' 2\" (1.88 m) Wt 78 kg (171 lb 15.3 oz) SpO2 96% BMI 22.08 kg/m² Intake/Output Summary (Last 24 hours) at 7/28/2020 1456 Last data filed at 7/28/2020 1455 Gross per 24 hour Intake 1550 ml Output 2050 ml Net -500 ml Physical Examination:  
 
 
     
Constitutional:  No acute distress, cooperative, pleasant, answers questions appropriately, appears stated age ENT:  Oral mucosa moist, oropharynx benign. Resp:  CTA bilaterally. No wheezing/rhonchi/rales. No accessory muscle use CV:  Regular irregular rhythm, normal rate, LVAD in place - Drive line noted to left lower abdomen GI:  Soft, non distended, non tender. , unable to auscultate bowel sounds due to LVAD. Musculoskeletal:  No edema, warm, 2+ pulses throughout Neurologic:  Moves all extremities with generalized weakness. AAOx2-3, CN II-XII reviewed Psych:  Depressive mood Skin:  Good turgor, no rashes or ulcers Data Review:  
 Review and/or order of clinical lab test 
Review and/or order of tests in the radiology section of CPT Review and/or order of tests in the medicine section of CPT Labs:  
 
Recent Labs  
  07/28/20 0220 07/27/20 0303 WBC 5.9 7.3 HGB 7.5* 7.7* HCT 24.6* 25.0*  
 170 Recent Labs  
  07/28/20 0220 07/27/20 0303 07/26/20 
0331 * 136 137  
K 4.4 4.1 3.9  104 105 CO2 22 24 25 BUN 24* 21* 23* CREA 1.34* 1.30 1.27  
GLU 94 100 96 CA 8.6 8.6 8.7 MG 2.3 2.0 2.0 PHOS  --  2.8 3.0 Recent Labs  
  07/28/20 0220 07/27/20 0303 07/26/20 
0331 ALT 16 17  --   
AP 92 94  --   
TBILI 0.5 0.6  --   
TP 7.0 7.0  --   
ALB 2.6* 2.8* 2.7*  
GLOB 4.4* 4.2*  -- No results for input(s): INR, PTP, APTT, INREXT, INREXT in the last 72 hours. No results for input(s): FE, TIBC, PSAT, FERR in the last 72 hours. Lab Results Component Value Date/Time Folate 16.5 01/16/2020 04:57 AM  
  
No results for input(s): PH, PCO2, PO2 in the last 72 hours. No results for input(s): CPK, CKNDX, TROIQ in the last 72 hours. No lab exists for component: CPKMB Lab Results Component Value Date/Time Cholesterol, total 90 10/26/2019 04:09 AM  
 HDL Cholesterol 21 10/26/2019 04:09 AM  
 LDL, calculated 56.2 10/26/2019 04:09 AM  
 Triglyceride 64 10/26/2019 04:09 AM  
 CHOL/HDL Ratio 4.3 10/26/2019 04:09 AM  
 
Lab Results Component Value Date/Time Glucose (POC) 119 (H) 07/18/2020 09:42 PM  
 Glucose (POC) 124 (H) 07/18/2020 04:45 PM  
 Glucose (POC) 116 (H) 07/18/2020 11:33 AM  
 Glucose (POC) 109 (H) 07/18/2020 06:13 AM  
 Glucose (POC) 99 01/27/2020 11:11 AM  
 
Lab Results Component Value Date/Time Color YELLOW/STRAW 07/21/2020 12:43 PM  
 Appearance CLOUDY (A) 07/21/2020 12:43 PM  
 Specific gravity 1.020 07/21/2020 12:43 PM  
 Specific gravity 1.015 10/31/2019 11:00 AM  
 pH (UA) 6.0 07/21/2020 12:43 PM  
 Protein 300 (A) 07/21/2020 12:43 PM  
 Glucose Negative 07/21/2020 12:43 PM  
 Ketone Negative 07/21/2020 12:43 PM  
 Bilirubin Negative 07/21/2020 12:43 PM  
 Urobilinogen 0.2 07/21/2020 12:43 PM  
 Nitrites Negative 07/21/2020 12:43 PM  
 Leukocyte Esterase MODERATE (A) 07/21/2020 12:43 PM  
 Epithelial cells MODERATE (A) 07/21/2020 12:43 PM  
 Bacteria Negative 07/21/2020 12:43 PM  
 WBC 10-20 07/21/2020 12:43 PM  
 RBC >100 (H) 07/21/2020 12:43 PM  
 
 
 
Medications Reviewed:  
 
Current Facility-Administered Medications Medication Dose Route Frequency  hydrALAZINE (APRESOLINE) tablet 10 mg  10 mg Oral TID  gentamicin (GARAMYCIN) 0.1 % cream   Topical BID Mon Wed & Fri  
 meropenem (MERREM) 500 mg in 0.9% sodium chloride (MBP/ADV) 50 mL  500 mg IntraVENous Q6H  
 hydrALAZINE (APRESOLINE) 20 mg/mL injection 10 mg  10 mg IntraVENous Q4H PRN  
 magnesium oxide (MAG-OX) tablet 400 mg  400 mg Oral BID  epoetin yvonne-epbx (RETACRIT) injection 10,000 Units  10,000 Units SubCUTAneous Q MON, WED & FRI  
 0.9% sodium chloride infusion 250 mL  250 mL IntraVENous PRN  
 calcium polycarbophiL (FIBERCON) tablet 625 mg  1 Tab Oral DAILY  sodium chloride (NS) flush 5-40 mL  5-40 mL IntraVENous Q8H  
 sodium chloride (NS) flush 5-40 mL  5-40 mL IntraVENous PRN  
 acetaminophen (TYLENOL) tablet 650 mg  650 mg Oral Q4H PRN  
 ondansetron (ZOFRAN) injection 4 mg  4 mg IntraVENous Q4H PRN  
  bisacodyL (DULCOLAX) tablet 5 mg  5 mg Oral DAILY PRN  
 digoxin (LANOXIN) tablet 0.125 mg  0.125 mg Oral EVERY OTHER DAY  finasteride (PROSCAR) tablet 5 mg  5 mg Oral DAILY  lactobac ac& pc-s.therm-b.anim (TIAN Q/RISAQUAD)  1 Cap Oral DAILY  levETIRAcetam (KEPPRA) tablet 250 mg  250 mg Oral BID  tamsulosin (FLOMAX) capsule 0.8 mg  0.8 mg Oral QHS  venlafaxine-SR (EFFEXOR-XR) capsule 150 mg  150 mg Oral DAILY WITH BREAKFAST  
 
______________________________________________________________________ EXPECTED LENGTH OF STAY: 4d 16h ACTUAL LENGTH OF STAY:          8 Moody Jordan MD

## 2020-07-28 NOTE — PROGRESS NOTES
[address] [facility] [place] [address] [facility] [place] [facility] Inpatient Progress Note Patient name: Vikki Rodríguez Patient : 1950 Patient MRN: [id number] Attending MD: Fabian Diaz MD 
Date of service: 20 REASON FOR CONSULT: 
UTI in patient with LVAD PROCEDURE PERFORMED:  Cystoscopy, transurethral resection of large bladder tumor.  
POD 5 
 
24hr Events: 
Slept better NTproBNP [id number] MAPs 80-90mmHg Multiple PI events upon VAD interrogation Neal draining pink/yellow urine PLAN: 
LVAD device high speed at 6000rpm and low speed at 5600rpm 
Cannot tolerate beta-blockers d/t severe RV failure Cannot tolerate ACE/ARB/ARNi due to persistent orthostasis despite increased fluid intake  
Cannot tolerate spironolactone due to IVVD and hyponatremia Continue digoxin 0.125 every other day, level 0.7; trend levels daily Monitor daily CardioMEMs readings, 16mmHg (20) hydralazine 10mg po TID, keep MAPs 70-90mmHg LVEDD > 7cm; consider RHC to determine device speed Unable to tolerate anticoagulation due to hematuria; stable LDH off aspirin UA (20) large blood, RBC >100, mod leukocytes Urine cytology; pathology revealing: Papillary urothelial carcinoma, high-grade, with lamina propria invasion, No muscularis propria present for evaluation Diagnostic cystoscopy and TURBT (20) -  resection of large papillary and solid bladder tumor on posterior wall Per urology may require repeat resection in 3-4 weeks based on path report Can D/C neal, void trial, replace neal for PVR > 350ml Nephrology consult appreciated 
bladder and renal US (20) No evidence for hydronephrosis or mass lesion, small left kidney Urology consult appreciated Manual bladder irrigation prn, gross hematuria w/ clots D/C IV zosyn (20) and changed to IV merrem per ID  
ID consult appreciated, assistance on ABX regimen Check paired blood cultures today Blood cultures (7/26/20) 1/4 bottles +GNRs Blood cultures (7/24/20) Pseudomonas in 2/4 bottles - susceptibilities available Blood cultures (7/22/20) Pseudomonas 2/4 bottles Blood cultures (7/19/20) Pseudomonas 2/4 bottles Blood cultures (7/17/20) Pseudomonas 2/4 bottles Wound culture (7/20/20) light staph aureus Gentamicin to Drive line exit site with daily dressing changes Will eventually need PICC placed for long term IV ABX, waiting for negative blood cultures Elevated sed rate- 109 (7/22/20) Continue soren Q po BID Fiber con 625 po daily Nutrition consult, appreciate recs Continue keppra,  Level 15.5 (7/18/20) D/C IV benadryl due to disorientation/confusion Hospitalist assistance appreciated DNR Daily weights, regular cardiac diet, strict I/O Trend CBC, CMP, NTproBNP, Mag, LD, procalcitonin, digoxin Remain in CVSU 
  
IMPRESSION: 
Bladder Carcinoma Gross Hematuria UTI Metabolic encephalopathy Chronic systolic heart failure Stage D, NYHA class II symptoms Coronary artery disease Ischemic cardiomyopathy, LVEF 15% S/p HM3 LVAD as destination therapy (11/18/19 by Dr. Tami Gomez) S/p Impella 5.0 as bridge to LVAD 
HTN, goal MAP 70-90mmHg H/o VT s/p [facility] BIV-ICD PAF 
H/o recurrent UTI, pseudomonal infection Unable to tolerate AC due to hematuria MSSA drive line infection COPD 
JOSE Essential tremor on keppra Depression on effexor Persistently elevated CEA PET scan increased RML activity, not malignant per hemonc Orthostatic hypotension DNR Up-to-date with PNA vaccine - per PCP/wife 
2/4bottles blood cultures +pseudomonas (7/24/20) diarrhea Patient seen and examined. Data and note reviewed. I have discussed and agree with the plans as noted. 71year old male with a history of ICM s/p LVAD as DT who was admitted with pseudomonas bacteremia. His bacteremia has not cleared despite IV antibiotics and likely has seeded the LVAD.  He is not a candidate for LVAD replacement. Long discussion with patient, wife, and Dr. Irma Dean regarding palliative options including life long antibiotics. Will request insurance approval for outpatient IV merrem. Also discussed the diagnosis of high grade bladder tumor and the need to repeat cytoscopy in 3-4 weeks. Will discuss further treatment plans with urology. Thank you for allowing us to participate in your patient's care. Mounika Sorto MD, Justine Huang Chief of Cardiology, [facility] Medical Director Calos Rueda [address], [address] [facility], [facility] Office [phone] Fax [phone] CARDIAC IMAGING: 
TTE 4/20 shows large LVIDd, 6.84 cm, RVIDd 3.06 cm, TAPSE 1.15 cm, severely elevated CVP 
TTE 7/6/20- Mild AI, LViDd 6.91cm, RVIDd 5.16cm, TAPSE 1.39cm Echo (7/18/20) · Contrast used: DEFINITY. · Image quality for this study was technically difficult. · Severely dilated left ventricle. Wall thickness appears thin. Severe systolic function. Estimated left ventricular ejection fraction is <15%. Visually measured ejection fraction. · Moderately dilated left atrium. · Moderately dilated right ventricle. Moderately to severely reduced systolic function. · Dilated right atrium. · Moderate mitral valve prolapse. 
  
 
LVAD INTERROGATION: 
Device interrogated in person, Increased Speed to 6000rpm, low speed to 5600rpm 
Device function normal, normal flow, multiple PI events LVAD Pump Speed (RPM): 6000 Pump Flow (LPM): 5.1 MAP: 90 
PI (Pulsitility Index): 3.4 Power: 4.8  Test: No 
Back Up  at Bedside & Labeled: Yes Power Module Test: No 
Driveline Site Care: No 
Driveline Dressing: Clean, Dry, and Intact Outpatient: No 
MAP in Therapeutic Range (Outpatient): No 
Testing Alarms Reviewed: Yes(PI events noted 7/27) Back up SC speed: 6000 Back up Low Speed Limit: 5600 Emergency Equipment with Patient?: Yes Emergency procedures reviewed?: Yes Drive line site inspected?: Yes Drive line intergrity inspected?: Yes Drive line dressing changed?: No 
 
PHYSICAL EXAM: 
Visit Vitals /90 (BP 1 Location: Right arm, BP Patient Position: At rest) Pulse 81 Temp 97.7 °F (36.5 °C) Resp 18 Ht 6' 2\" (1.88 m) Wt 171 lb 15.3 oz (78 kg) SpO2 96% BMI 22.08 kg/m² Review of Systems Constitutional: Positive for malaise/fatigue. Negative for chills, fever and weight loss. Slept better last night HENT: Positive for hearing loss. Eyes: Negative. Respiratory: Positive for shortness of breath. Improved Cardiovascular: Positive for leg swelling. Negative for chest pain, palpitations and orthopnea. Gastrointestinal: Negative. Genitourinary: Positive for hematuria. Negative for dysuria, flank pain and urgency. Musculoskeletal: Negative. Skin: Negative. Neurological: Positive for weakness. Endo/Heme/Allergies: Bruises/bleeds easily. Psychiatric/Behavioral: Positive for depression. Physical Exam 
Vitals signs and nursing note reviewed. Constitutional:   
   General: He is not in acute distress. Appearance: He is ill-appearing. Comments: Sitting up in chair HENT:  
   Head: Normocephalic. Mouth/Throat:  
   Mouth: Mucous membranes are moist.  
Neck:  
   Vascular: No JVD. Cardiovascular:  
   Rate and Rhythm: Normal rate and regular rhythm. Heart sounds: Murmur present. Comments: +lvad hum Pulmonary:  
   Effort: Pulmonary effort is normal.  
   Breath sounds: Normal breath sounds. Abdominal:  
   General: Bowel sounds are normal.  
   Palpations: Abdomen is soft. Genitourinary: 
   Comments: Lizama catheter w/light pink yellow Musculoskeletal:  
   Right lower leg: Edema present. Left lower leg: Edema present. Skin: 
   General: Skin is warm and dry. Capillary Refill: Capillary refill takes 2 to 3 seconds. Comments: Scattered bruising on extremities Neurological:  
   Mental Status: He is alert and oriented to person, place, and time. Motor: Weakness present. Psychiatric:     
   Attention and Perception: Attention normal.     
   Speech: Speech normal.     
   Behavior: Behavior is cooperative. Thought Content: Thought content normal.  
 
 
 
 
 
PAST MEDICAL HISTORY: 
Past Medical History:  
Diagnosis Date  BPH (benign prostatic hyperplasia)  CHF (congestive heart failure) ([facility]  Degenerative disc disease, lumbar  Heart failure ([facility]  High cholesterol  Hypertension  Ischemic cardiomyopathy  LVAD (left ventricular assist device) present ([facility]  Paroxysmal atrial fibrillation ([facility] 4/2/2019  Spinal stenosis PAST SURGICAL HISTORY: 
Past Surgical History:  
Procedure Laterality Date  COLONOSCOPY Left 11/11/2019 COLONOSCOPY at bedside performed by Aki Kay MD at [address] [place] [address]  HX CORONARY ARTERY BYPASS GRAFT    
 triple  HX HEART ASSIST DEVICE Left 10/29/2019 Impella 5.0  
 HX HEART ASSIST DEVICE Left 11/18/2019 HeartMate 3  
 HX HERNIA REPAIR    
 HX IMPLANTABLE CARDIOVERTER DEFIBRILLATOR  HX THROMBECTOMY Left 11/25/2019 Left brachial thrombectomy  NH CARDIOVERSION ELECTIVE ARRHYTHMIA EXTERNAL N/A 6/10/2019 EP CARDIOVERSION performed by Marylou Shaffer MD at [place] [address] CATH LAB  NH CARDIOVERSION ELECTIVE ARRHYTHMIA EXTERNAL N/A 6/18/2019 EP CARDIOVERSION performed by Jenifer Gonsalez MD at [place] [address] CATH LAB  NH INSJ/RPLCMT PERM DFB W/TRNSVNS LDS 1/DUAL CHMBR N/A 6/21/2019 INSERT ICD BIV MULTI performed by Nell Sosa MD at [place] [address] CATH LAB  NH TCAT IMPL WRLS P-ART PRS SNR L-T HEMODYN MNTR N/A 9/18/2019  IMPLANT HEART FAILURE MONITORING DEVICE performed by Link Millard MD at [facility] CARDIAC CATH LAB FAMILY HISTORY: 
Family History Problem Relation Age of Onset  Lung Disease Mother  Hypertension Mother [facility] Arthritis-osteo Mother  Heart Disease Mother  Heart Disease Father  Diabetes Father SOCIAL HISTORY: 
Social History Socioeconomic History  Marital status:  Spouse name: Not on file  Number of children: Not on file  Years of education: Not on file  Highest education level: Not on file Tobacco Use  Smoking status: Former Smoker Last attempt to quit: 2010 Years since quittin.6  Smokeless tobacco: Never Used Substance and Sexual Activity  Alcohol use: Not Currently Comment: rarely  Drug use: Never Other Topics Concern LABORATORY RESULTS: 
  
Labs Latest Ref Rng & Units 2020 WBC 4.1 - 11.1 K/uL 5.9 7.3 - 6.1 7.4 6.9 6.4  
RBC 4.10 - 5.70 M/uL 2.54(L) 2.60(L) - 2.47(L) 2.47(L) 2.58(L) 2.41(L) Hemoglobin 12.1 - 17.0 g/dL 7. 5(L) 7. 7(L) 7. 6(L) 7. 4(L) 7. 3(L) 7. 7(L) 7. 2(L) Hematocrit 36.6 - 50.3 % 24. 6(L) 25. 0(L) - 24. 2(L) 24. 3(L) 24. 7(L) 23. 7(L) MCV 80.0 - 99.0 FL 96.9 96.2 - 98.0 98.4 95.7 98.3 Platelets 668 - 466 K/uL 169 170 - 180 202 191 201 Lymphocytes 12 - 49 % - - - - 11(L) - 10(L) Monocytes 5 - 13 % - - - - 6 - 7 Eosinophils 0 - 7 % - - - - 2 - 3 Basophils 0 - 1 % - - - - 1 - 1 Albumin 3.5 - 5.0 g/dL 2. 6(L) 2. 8(L) - 2. 7(L) 2. 8(L) 2. 8(L) 2. 9(L) Calcium 8.5 - 10.1 MG/DL 8.6 8.6 - 8.7 8.6 8.5 8.4(L) Glucose 65 - 100 mg/dL 94 100 - 96 95 110(H) 87 BUN 6 - 20 MG/DL 24(H) 21(H) - 23(H) 30(H) 29(H) 26(H) Creatinine 0.70 - 1.30 MG/DL 1.34(H) 1.30 - 1.27 1.62(H) 1.62(H) 1.68(H) Sodium 136 - 145 mmol/L 135(L) 136 - 137 137 135(L) 135(L) Potassium 3.5 - 5.1 mmol/L 4.4 4.1 - 3.9 3.9 4.4 4.0 TSH 0.36 - 3.74 uIU/mL - - - - - - -  
PSA 0.01 - 4.0 ng/mL - - - - - - -  
 LDH 85 - 241 U/L 184 167 - 166 204 171 192 Some recent data might be hidden Lab Results Component Value Date/Time TSH 1.13 07/18/2020 11:28 AM  
 TSH 1.70 04/21/2020 01:59 PM  
 TSH 2.30 12/03/2019 03:29 AM  
 TSH 2.40 10/25/2019 07:39 PM  
 TSH 2.45 06/01/2019 04:16 AM  
 
 
ALLERGY: 
Allergies Allergen Reactions  Cefepime Other (comments)  
  myoclonus CURRENT MEDICATIONS: 
 
Current Facility-Administered Medications:  
  hydrALAZINE (APRESOLINE) tablet 10 mg, 10 mg, Oral, TID, Eulene Shames, NP, 10 mg at 07/28/20 7831 
  gentamicin (GARAMYCIN) 0.1 % cream, , Topical, BID Mon Wed & Fri, Eulene Shames, NP 
  meropenem (MERREM) 500 mg in 0.9% sodium chloride (MBP/ADV) 50 mL, 500 mg, IntraVENous, Q6H, Geoff Godfrey MD, Last Rate: 100 mL/hr at 07/28/20 0909, 500 mg at 07/28/20 1956   hydrALAZINE (APRESOLINE) 20 mg/mL injection 10 mg, 10 mg, IntraVENous, Q4H PRN, Eulene Shames, NP, 10 mg at 07/27/20 2036 
  magnesium oxide (MAG-OX) tablet 400 mg, 400 mg, Oral, BID, Eulene Shames, NP, 400 mg at 07/28/20 0468   epoetin yvonne-epbx (RETACRIT) injection 10,000 Units, 10,000 Units, SubCUTAneous, Q MON, WED & FRI, Carmen Mcnally MD, 10,000 Units at 07/27/20 2036 
  0.9% sodium chloride infusion 250 mL, 250 mL, IntraVENous, PRN, Levi Shana B, NP 
  calcium polycarbophiL (FIBERCON) tablet 625 mg, 1 Tab, Oral, DAILY, Eulene Shames, NP, 625 mg at 07/28/20 4596   sodium chloride (NS) flush 5-40 mL, 5-40 mL, IntraVENous, Q8H, Madhuri Zhao DO, 10 mL at 07/28/20 9229   sodium chloride (NS) flush 5-40 mL, 5-40 mL, IntraVENous, PRN, Thomas Oropeza M, DO 
  acetaminophen (TYLENOL) tablet 650 mg, 650 mg, Oral, Q4H PRN, Thomas FRENCH, DO, 650 mg at 07/27/20 4294   ondansetron (ZOFRAN) injection 4 mg, 4 mg, IntraVENous, Q4H PRN, Thomas FRENCH, DO, 4 mg at 07/22/20 0652   bisacodyL (DULCOLAX) tablet 5 mg, 5 mg, Oral, DAILY PRN, Merlin Kluver, [employer] M, DO 
  digoxin (LANOXIN) tablet 0.125 mg, 0.125 mg, Oral, EVERY OTHER DAY, Joseph FRENCH, DO, 0.125 mg at 07/28/20 7081 
  finasteride (PROSCAR) tablet 5 mg, 5 mg, Oral, DAILY, Hyun Zhao, DO, 5 mg at 07/28/20 2016   [facility] ac& pc-s.therm-b.anim (TIAN Q/RISAQUAD), 1 Cap, Oral, DAILY, Man Goyal, DO, 1 Cap at 07/28/20 2053   levETIRAcetam (KEPPRA) tablet 250 mg, 250 mg, Oral, BID, Joseph FRENCH, DO, 250 mg at 07/28/20 0651   tamsulosin (FLOMAX) capsule 0.8 mg, 0.8 mg, Oral, QHS, Hyun Zhao, DO, 0.8 mg at 07/27/20 2323 
  venlafaxine-SR (EFFEXOR-XR) capsule 150 mg, 150 mg, Oral, DAILY WITH BREAKFAST, Hyun Zhao, DO, 150 mg at 07/28/20 5674 Thank you for allowing me to participate in this patient's care. TIERRA Sanderson [address], [address] Phone: [phone] Fax: [phone]

## 2020-07-28 NOTE — PROGRESS NOTES
0730: Bedside and Verbal shift change report given to [place] (oncoming nurse) by Johnston Schilder, RN (offgoing nurse). Report included the following information SBAR, Kardex, Intake/Output, MAR, Accordion, Recent Results and Cardiac Rhythm paced. 1449: patient up to chair with RN 
 
36: Patient ambulating hallway with physical therapy 1200: Urology NP at bedside, per NP note if no bladder irrigation required in 24 hours, neal can be removed. Monitor post void residuals post neal removal 
 
1330: blood cultures drawn and sent 1930: Bedside and Verbal shift change report given to Johnston Schilder, RN (oncoming nurse) by KATRIN Aguirre (offgoing nurse). Report included the following information SBAR, Kardex, Intake/Output, MAR, Accordion, Recent Results and Cardiac Rhythm paced. Problem: Falls - Risk of 
Goal: *Absence of Falls Description: Document Man Carrel Fall Risk and appropriate interventions in the flowsheet. Outcome: Progressing Towards Goal 
Note: Fall Risk Interventions: 
Mobility Interventions: Communicate number of staff needed for ambulation/transfer, OT consult for ADLs, Patient to call before getting OOB, PT Consult for mobility concerns, PT Consult for assist device competence Medication Interventions: Assess postural VS orthostatic hypotension, Evaluate medications/consider consulting pharmacy, Patient to call before getting OOB, Teach patient to arise slowly Elimination Interventions: Call light in reach, Patient to call for help with toileting needs, Toileting schedule/hourly rounds Problem: Patient Education: Go to Patient Education Activity Goal: Patient/Family Education Outcome: Progressing Towards Goal 
  
Problem: LVAD: Discharge Outcomes Goal: *Hemodynamically stable Outcome: Progressing Towards Goal 
Goal: *Lungs clear or at baseline Outcome: Progressing Towards Goal 
Goal: *Tolerating diet Outcome: Progressing Towards Goal 
Goal: *LVAD parameters within set limits Outcome: Progressing Towards Goal 
Goal: *LVAD drive line site without signs and symptoms of wound complications Outcome: Progressing Towards Goal 
  
Problem: Pressure Injury - Risk of 
Goal: *Prevention of pressure injury Description: Document Mich Scale and appropriate interventions in the flowsheet. Outcome: Progressing Towards Goal 
Note: Pressure Injury Interventions: Activity Interventions: Assess need for specialty bed, Pressure redistribution bed/mattress(bed type), PT/OT evaluation Mobility Interventions: Assess need for specialty bed, Chair cushion, Pressure redistribution bed/mattress (bed type), PT/OT evaluation Nutrition Interventions: Document food/fluid/supplement intake Friction and Shear Interventions: Apply protective barrier, creams and emollients, Lift sheet, Minimize layers Problem: Patient Education: Go to Patient Education Activity Goal: Patient/Family Education Outcome: Progressing Towards Goal 
  
Problem: Nutrition Deficit Goal: *Optimize nutritional status Outcome: Progressing Towards Goal 
  
Problem: Patient Education: Go to Patient Education Activity Goal: Patient/Family Education Outcome: Progressing Towards Goal 
  
Problem: Patient Education: Go to Patient Education Activity Goal: Patient/Family Education Outcome: Progressing Towards Goal

## 2020-07-28 NOTE — PROGRESS NOTES
Comprehensive Nutrition Assessment Type and Reason for Visit: Geovanni Stevenson Nutrition Recommendations/Plan: 1. Continue current diet 2. Daily weights on standing scale Nutrition Assessment:   
Pt admitted for AMS. PMHx: HTN, CKD, CHF, depression, others noted. S/p LVAD placement on 11/18. CVA and bacteremia post-op with extended stay. Fatigue and AMS at home PTA. Driveline infection noted with bacteremia, ID following. S/p TURBT (7/23/20) for resection of  bladder tumor. Hematuria yesterday but resolved. Pt visited today. Very well know from previous admits. Wt gain since previous admits with wt stable around 185# earlier this summer. Wt loss with confusion PTA. Confusion continues at times but overall improved. Appetite improved, ate 80% breakfast this morning with no issues. Declining supplements but will continue yogurt BID as snacks  Wears dentures but no problem with these. Malnutrition Assessment: 
Malnutrition Status:  Mild malnutrition Context:  Acute illness Findings of the 6 clinical characteristics of malnutrition:  
Energy Intake:  1 - 75% or less of est energy req for 7 or more days Nutritionally Significant Medications: fibercon, retacrit, gentamicin, Bhakti-Q, keppra, mag-oc, merrem Estimated Daily Nutrient Needs: 
Energy (kcal):  1210-4734(MSJ x 1.3-1.4) Protein (g):  98-114g (1.201.4g/kg) Fluid (ml/day):  2140 - 1ml/kcal or per renal/cardio Nutrition Related Findings:  BM 7/28, trace edema Wounds:  None Current Nutrition Therapies:  
Diet: regular, 3-4 g Na, high fiber + snacks Meal intake:  
Patient Vitals for the past 168 hrs: 
 % Diet Eaten  
07/27/20 1910 0 %  
07/27/20 1107 0 %  
07/27/20 0756 0 %  
07/26/20 0820 75 %  
07/24/20 1153 75 %  
07/24/20 0813 100 % 07/23/20 1757 100 % 07/23/20 1147 0 % NPO  
07/23/20 0743 0 % NPO  
07/22/20 1518 0 % NPO  
07/22/20 0800 0 % NPO  
07/21/20 1502 100 % Anthropometric Measures: · Height:  6' 2\" (188 cm) · Current Body Wt:  81.2 kg (179 lb) · Admission Body Wt:      
· Usual Body Wt:  185 lb · Ideal Body Wt:  190:  94.2 % Nutrition Diagnosis:  
· Inadequate protein-energy intake related to cognitive or neurological impairment(AMS 2/2 infection) as evidenced by weight loss, intake 26-50%, poor intake prior to admission Nutrition Interventions:  
Food and/or Nutrient Delivery: Modify current diet, Snacks (specify)(3-4g NA, yogurt BID for snack) Nutrition Education and Counseling: No recommendations at this time Coordination of Nutrition Care: No recommendation at this time Goals: 
Consumption of at least 75% meals and 2 snacks per day in 5-7 days; wt maintenance Nutrition Monitoring and Evaluation:  
Behavioral-Environmental Outcomes:   
Food/Nutrient Intake Outcomes: Food and nutrient intake Physical Signs/Symptoms Outcomes: Weight Discharge Planning: Too soon to determine Gunner Ayala, RD [address] [place] , Pager #[phone] or via LiveIntent

## 2020-07-28 NOTE — PROGRESS NOTES
Transitions of Care Plan: 
 RUR: 35% Bacteremia; IV merropennum - long term 
            Baseline: rollator and WC; LVAD; open with [facility] 
            Disposition: home with [facility] - IV abx; LVAD Updated by [facility] and ID - patient will be going home on long term IV antibiotic - merropennum 1gm q8. CM sent request for cost/copay to [employer] - inquiry also sent for financial assistance/cinthia for patient with this case. [employer] will run insurance first and then we will work towards payment plan. CM to continue to follow.  
 
Fortunato Colunga, MPH

## 2020-07-28 NOTE — PROGRESS NOTES
[facility] VAD/Heart Failure Progress Note Admit Date: 2020 POD:  5 Days Post-Op Procedure:  Procedure(s): 
CYSTO RETROGRADE PYELOGRAM/  BLADDER BIOPSY/ TURBT Subjective:  
Mild dyspnea, fatigue, and weakness; sleeping better; PI events Objective:  
Vitals: 
Blood pressure 110/90, pulse 81, temperature 97.7 °F (36.5 °C), resp. rate 18, height 6' 2\" (1.88 m), weight 171 lb 15.3 oz (78 kg), SpO2 96 %. Temp (24hrs), Av.8 °F (36.6 °C), Min:97.4 °F (36.3 °C), Max:98.1 °F (36.7 °C) Hemodynamics: 
 CO:   
 CI:   
 CVP:   
 SVR:   
 PAP Systolic:   
 PAP Diastolic:   
 PVR:   
 KJ06:   
 SCV02:   
 
VAD Interrogation: LVAD (Heartmate) Pump Speed (RPM): 6000 Pump Flow (LPM): 5.1 Chatter in Lines: No 
PI (Pulsitility Index): 3.4 Power: 4.8 MAP: 82  Test: No 
Back Up  at Bedside & Labeled: Yes Power Module Test: No 
Driveline Site Care: No 
Driveline Dressing: Clean, Dry, and Intact EKG/Rhythm:   
 
Extubation Date / Time:  
 
CT Output:  
 
Ventilator: 
  
 
Oxygen Therapy: 
Oxygen Therapy O2 Sat (%): 96 % (20 1155) Pulse via Oximetry: 80 beats per minute (20 0520) O2 Device: Room air (20 1155) O2 Flow Rate (L/min): 2 l/min (20 0321) CXR: 
 
Admission Weight: Last Weight Weight: 175 lb 0.7 oz (79.4 kg) Weight: 171 lb 15.3 oz (78 kg) Intake / Output / Drain: 
Current Shift:  07 -  1900 In: 18 [P.O.:360; I.V.:150] Out: 700 [Urine:700] Last 24 hrs.:  
 
Intake/Output Summary (Last 24 hours) at 2020 1459 Last data filed at 2020 1455 Gross per 24 hour Intake 1550 ml Output 2050 ml Net -500 ml No results for input(s): CPK, CKMB, TROIQ in the last 72 hours. Recent Labs  
  20 
0220 20 
0303 20 
1323 20 
0331 * 136  --  137  
K 4.4 4.1  --  3.9 CO2 22 24  --  25 BUN 24* 21*  --  23* CREA 1.34* 1.30  --  1.27  
 GLU 94 100  --  96 PHOS  --  2.8  --  3.0 MG 2.3 2.0  --  2.0 WBC 5.9 7.3  --  6.1 HGB 7.5* 7.7* 7.6* 7.4* HCT 24.6* 25.0*  --  24.2*  
 170  --  180 No results for input(s): INR, PTP, APTT, INREXT in the last 72 hours. No lab exists for component: PBNP Current Facility-Administered Medications:  
  hydrALAZINE (APRESOLINE) tablet 10 mg, 10 mg, Oral, TID, Katherin Ma NP, 10 mg at 07/28/20 6820 
  gentamicin (GARAMYCIN) 0.1 % cream, , Topical, BID Mon Wed & Fri, Katherin Ma NP 
  meropenem (MERREM) 500 mg in 0.9% sodium chloride (MBP/ADV) 50 mL, 500 mg, IntraVENous, Q6H, Geoff Godfrey MD, Last Rate: 100 mL/hr at 07/28/20 1445, 500 mg at 07/28/20 1445   hydrALAZINE (APRESOLINE) 20 mg/mL injection 10 mg, 10 mg, IntraVENous, Q4H PRN, Katherin Ma NP, 10 mg at 07/27/20 2036 
  magnesium oxide (MAG-OX) tablet 400 mg, 400 mg, Oral, BID, Katherin Ma NP, 400 mg at 07/28/20 0441   epoetin yvonne-epbx (RETACRIT) injection 10,000 Units, 10,000 Units, SubCUTAneous, Q MON, WED & FRI, Aleena Fowler MD, 10,000 Units at 07/27/20 2036 
  0.9% sodium chloride infusion 250 mL, 250 mL, IntraVENous, PRN, Shana Sims NP 
  calcium polycarbophiL (FIBERCON) tablet 625 mg, 1 Tab, Oral, DAILY, Katherin Ma NP, 625 mg at 07/28/20 6860   sodium chloride (NS) flush 5-40 mL, 5-40 mL, IntraVENous, Q8H, Madhuri Zhao DO, 10 mL at 07/28/20 1445   sodium chloride (NS) flush 5-40 mL, 5-40 mL, IntraVENous, PRN, Kylah Esquivel M, DO 
  acetaminophen (TYLENOL) tablet 650 mg, 650 mg, Oral, Q4H PRN, Kylah FRENCH, DO, 650 mg at 07/27/20 2322   ondansetron (ZOFRAN) injection 4 mg, 4 mg, IntraVENous, Q4H PRN, Kylah FRENCH, DO, 4 mg at 07/22/20 2485   bisacodyL (DULCOLAX) tablet 5 mg, 5 mg, Oral, DAILY PRN, Cunninghamnathan [employer] M, DO 
  digoxin (LANOXIN) tablet 0.125 mg, 0.125 mg, Oral, EVERY OTHER DAY, Hyun Hayden M, DO, 0.125 mg at 07/28/20 0166 
  finasteride (PROSCAR) tablet 5 mg, 5 mg, Oral, DAILY, Alvin Sea M, DO, 5 mg at 07/28/20 7558   lactobac ac& pc-s.therm-b.anim (TIAN Q/RISAQUAD), 1 Cap, Oral, DAILY, Linnea Chalet Jay Jay, DO, 1 Cap at 07/28/20 5264   levETIRAcetam (KEPPRA) tablet 250 mg, 250 mg, Oral, BID, Alvin Sea M, DO, 250 mg at 07/28/20 9785   tamsulosin (FLOMAX) capsule 0.8 mg, 0.8 mg, Oral, QHS, Hyun Zhao M, DO, 0.8 mg at 07/27/20 2323 
  venlafaxine-SR (EFFEXOR-XR) capsule 150 mg, 150 mg, Oral, DAILY WITH BREAKFAST, Hyun Zhao, DO, 150 mg at 07/28/20 0356 
 
  
A/P 
S/P LVAD - good flows Need for anticoagulation - on hodl due to recurrent bleeds UTI - abx's Confusion - monitor 
  
Risk of morbidity and mortality - high Medical decision making - high complexity Signed By: Pritesh Miranda MD

## 2020-07-28 NOTE — PROGRESS NOTES
 
PHYSICAL THERAPY TREATMENT Patient: Walter Bajwa (75 y.o. male) Date: 7/28/2020 Diagnosis: Acute encephalopathy [G93.40] Acute encephalopathy [G93.40] <principal problem not specified> Procedure(s) (LRB): 
CYSTO RETROGRADE PYELOGRAM/  BLADDER BIOPSY/ TURBT (N/A) 5 Days Post-Op Precautions: Fall(LVAD) Chart, physical therapy assessment, plan of care and goals were reviewed. ASSESSMENT Patient continues with skilled PT services and is progressing towards goals. Pt was quick to sit EOB with power leads entangled and  hanging. Pt initiated to correct but required assistance. Pt was able to increase gait tolerance with no major LOB. Pt tolerating OOB to chair. Per discussion with OT later in the morning pt did not remember treatment session. Current Level of Function Impacting Discharge (mobility/balance): CGA Other factors to consider for discharge: periodic confusion PLAN : 
Patient continues to benefit from skilled intervention to address the above impairments. Continue treatment per established plan of care. to address goals. Recommendation for discharge: (in order for the patient to meet his/her long term goals) Physical therapy at least 2 days/week in the home AND ensure assist and/or supervision for safety with functional mobility as needed This discharge recommendation: 
Has not yet been discussed the attending provider and/or case management IF patient discharges home will need the following DME: none SUBJECTIVE:  
Patient stated I can go maybe a little further .  OBJECTIVE DATA SUMMARY:  
Critical Behavior: 
Neurologic State: Alert Orientation Level: Oriented X4(aware was in hospital, thought he had moved rooms) Cognition: Memory loss Safety/Judgement: Awareness of environment Functional Mobility Training: 
Bed Mobility: 
  
Supine to Sit: Supervision Transfers: 
Sit to Stand: Contact guard assistance;Assist x1;Additional time Stand to Sit: Contact guard assistance(cues for) Bed to Chair: Contact guard assistance;Assist x1;Additional time; Adaptive equipment(A for LVAD power line) Balance: 
Sitting: Intact Standing: Impaired Standing - Static: Good Standing - Dynamic : Fair Ambulation/Gait Training: 
Distance (ft): 100 Feet (ft) Assistive Device: Gait belt;Walker, rolling Ambulation - Level of Assistance: Contact guard assistance Gait Abnormalities: Decreased step clearance Base of Support: Narrowed Step Length: Left shortened;Right shortened Stairs: Therapeutic Exercises:  
 
Pain Rating: No complaints Activity Tolerance:  
Limited Please refer to the flowsheet for vital signs taken during this treatment. After treatment patient left in no apparent distress:  
Sitting in chair and Call bell within reach COMMUNICATION/COLLABORATION:  
The patients plan of care was discussed with: Registered nurse CHEN Lindquist PTA Time Calculation: 21 mins

## 2020-07-28 NOTE — PROGRESS NOTES
Patient: Cayden Lemus MRN: [id number]  SSN: [id number] YOB: 1950  Age: 71 y.o. Sex: male ADMITTED: 2020 to [facility] MD by Zhao Cast MD for Acute encephalopathy [G93.40] Acute encephalopathy [G93.40] POD# 5 Days Post-Op Procedure(s): 
CYSTO RETROGRADE PYELOGRAM/  BLADDER BIOPSY/ TURBT 1. Gross hematuria, resolved 2. Bladder wall thickening on CT 3. Large papillary and solid bladder tumor on posterior wall. Pathology report:  
4. S/p cystoscopy, TURBT 2020 Pt OOB and sitting in chair. Denies pain and states he \"feels better\". Hgb stable. Cr 1.34. Path report reviewed w/ pt. Vitals: Temp (24hrs), Av.8 °F (36.6 °C), Min:97.4 °F (36.3 °C), Max:98.1 °F (36.7 °C) Blood pressure 110/90, pulse 81, temperature 97.7 °F (36.5 °C), resp. rate 18, height 6' 2\" (1.88 m), weight 78 kg (171 lb 15.3 oz), SpO2 96 %. Intake and Output: 
 1901 -  0700 In: 4808 [P.O.:1280; I.V.:400] Out: 2950 [Urine:2950]  0701 -  1900 In: 390 [P.O.:240; I.V.:150] Out: -  
THERESE Output lats 24 hrs: No data found. THERESE Output last 8 hrs: No data found. BM over last 24 hrs:  
Patient Vitals for the past 24 hrs: 
 Stool Occurrence(s)  
20 0808 1 Exam:  
Appearance: Well-developed no acute distress Conjunctiva: Conjunctiva and lids normal 
External ears/nose: Normal no lesions or deformities Respiratory effort: Breathing easily Abdomen/flank: Soft, nontender, without masses, no CVA tenderness Digits/nails: No clubbing cyanosis or petechiae Skin inspection: Warm and dry Sensation: No sensory deficits Judgment/Insight: intact Mood/Affect: normal 
 
Labs: CBC:  
Lab Results Component Value Date/Time WBC 5.9 2020 02:20 AM  
 HCT 24.6 (L) 2020 02:20 AM  
 PLATELET 490  02:20 AM  
 
BMP:  
Lab Results Component Value Date/Time  Glucose 94 2020 02:20 AM  
 Sodium 135 (L) 2020 02:20 AM  
 Potassium 4.4 07/28/2020 02:20 AM  
 Chloride 106 07/28/2020 02:20 AM  
 CO2 22 07/28/2020 02:20 AM  
 BUN 24 (H) 07/28/2020 02:20 AM  
 Creatinine 1.34 (H) 07/28/2020 02:20 AM  
 Calcium 8.6 07/28/2020 02:20 AM  
 
Cultures: BCX+ Assessment/Plan: 1. Gross hematuria, resolved 2. Bladder wall thickening on CT 3. Large papillary and solid bladder tumor on posterior wall. Pathology report:  
4. S/p cystoscopy, TURBT 7/23/2020 
 
-Urine now is clear/yellow. -Manually irrigate neal for return of gross hematuria. -If no irrigation is required for 24 hrs., can DC neal and do void trial. Replace neal for PVR >350 mL. -Pathology revealing: Papillary urothelial carcinoma, high-grade, with lamina propria invasion, No muscularis propria present for evaluation. 
-Discussed w/ pt he will require repeat resection in 3-4 weeks, based on path report. , to be discussed further at OP FU w/ Dr. Abhishek Simon. Signed By: Shakira Hernández NP - July 28, 2020 I met with him on floor and discussed pathology. 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 Urine is clear I had a discussion with Dr. Cheyenne Rubi regarding his life expectancy and prognosis given his LVAD. He is no longer going to receive anticoagulation and he is DNR. Ideally, for T1 disease without muscle, the individuals need a repeat resection in 4-6 weeks to establish whether or not muscle invasion prior to determining need for a radical treatment like cystectomy or chemo-radiation. Due to his existing comorbid conditions, repeat resection will not likely prolong his survival. We plan to have detailed discussion again when he returns to office. He is scheduled to return to office to see on 8/13 at 10:00 am at [facility].

## 2020-07-28 NOTE — PROGRESS NOTES
ID Progress Note 2020 Subjective:  
 
Doing better overall, still fatigues easily--somewhat depressed, no physical complaints, however. Cystoscopy with removal of lesion today. Objective: Antibiotics: 1. Zosyn Vitals:  
Visit Vitals /90 (BP 1 Location: Right arm, BP Patient Position: At rest) Pulse 81 Temp 97.7 °F (36.5 °C) Resp 18 Ht 6' 2\" (1.88 m) Wt 78 kg (171 lb 15.3 oz) SpO2 96% BMI 22.08 kg/m² Tmax:  Temp (24hrs), Av.8 °F (36.6 °C), Min:97.4 °F (36.3 °C), Max:98.1 °F (36.7 °C) Exam:  Lungs:  clear to auscultation bilaterally Heart:  regular rate and rhythm Abdomen:  soft, non-tender. Bowel sounds normal. No masses,  no organomegaly Labs:     
Recent Labs  
  20 
0220 20 
0303 20 
1323 20 
0331 WBC 5.9 7.3  --  6.1 HGB 7.5* 7.7* 7.6* 7.4*  170  --  180 BUN 24* 21*  --  23* CREA 1.34* 1.30  --  1.27  
AP 92 94  --   --   
TBILI 0.5 0.6  --   --   
 
 
Cultures: No results found for: SDES Lab Results Component Value Date/Time Culture result: (A) 2020 01:23 PM  
  GRAM NEGATIVE RODS GROWING IN 1 OF 4 BOTTLES DRAWN SITE = ( RAC ) Culture result: (A) 2020 01:23 PM  
  PRELIMINARY REPORT OF GRAM NEGATIVE RODS GROWING IN 1 OF 4 BOTTLES DRAWN CALLED TO AND READ BACK BY MS ROSALIND WILKES AT  ON 20. PJ  
 Culture result: REMAINING BOTTLE(S) HAS/HAVE NO GROWTH SO FAR 2020 01:23 PM  
 
 
Radiology:  
 
Line/Insert Date:        
 
Assessment: 1. Drive line infection 2. Recurrent Pseudomonas bacteremia--persistent 3. Organism resistant to oral antibiotic options 4. Options include--lifelong antibiotic therapy, move LVAD or Hospice Objective: 1. Change to Merrem in face of ongoing bacteremia 2. Case management looking into coverage options 3. Case discussed with patient, wife and Dr Devin Hawk MD

## 2020-07-28 NOTE — CDMP QUERY
#3 
 
Pt admitted with UTI and has malnutrition documented by nutritional consult. Please further specify type of malnutrition. ? Mild Protein Calorie Malnutrion ? Protein calorie malnutrition-unspecified ? Other (please specify) ? Unable to determine The medical record reflects the following: 
  Risk Factors: weight loss Clinical Indicators:  
7/28-nutritional consult Pt visited today. Very well know from previous admits. Wt gain since previous admits with wt stable around 185# earlier this summer. Wt loss with confusion PTA. Confusion continues at times but overall improved. Appetite improved, ate 80% breakfast this morning with no issues. Malnutrition Assessment: 
Malnutrition Status:  Mild malnutrition Context:  Acute illness Findings of the 6 clinical characteristics of malnutrition:  
Energy Intake:  1 - 75% or less of est energy req for 7 or more days Nutrition Diagnosis:  
· Inadequate protein-energy intake related to cognitive or neurological impairment(AMS 2/2 infection) as evidenced by weight loss, intake 26-50%, poor intake prior to admission Treatment: nutritional consult, dietary supplements,continue yogurt BID as snacks Thank you, 
       Moises Oh [facility], [address] [facility]

## 2020-07-29 NOTE — PROGRESS NOTES
Transitions of Care Plan: 
 RUR: 35% Bacteremia; IV merrem - long term 
            Baseline: rollator and WC; LVAD; open with [facility] 
            Disposition: home with [facility] - IV abx; LVAD 
 
0800 - Received update from [facility] - patient's copay is $55.03 per day for ambulatory pump. Patient has OOP max of $3000. He has met $500 this year. 4559 - [employer] referral placed yesterday 7/28/20 for Medicaid screening. 1030 -  spoke with Mavis Colón at [facility] - inquired about cinthia assistance and payment plans for cost of merrem. [employer] not confident that patient will qualify for their hardship assistance due to having insurance (). Patient does not have Medicare Part D which causes a higher copay. Per company policy, cinthia cases are only done for patients that are uninsured. Patient is eligible for payment plan - interest free monthly payments. CM advised Mavis Colón we will reach out to alternate infusion companies to inquire into price for patient's IV merrem. [facility] will keep case open until CM and patient make determination on agency for cost of merrem. 1040 - Updated [facility] on cost/copay for IV merrem. CM to follow up with ID re alternatives for IV abx. 
 
1045 - Sent referral to [facility]. 1050 - CM spoke with Mar Ferguson at [facility] (p: [phone]) to notify of referral and explained situation for patient - life long IV abx; . Yesenia to review referral and get back with CM today regarding update for cost/copay. 1235 - CM received call from Mar Ferguson at [facility] (p: [phone]) who provided update. Patient is covered 100% with  insurance  benefit. [facility] confirmed via written documentation in [employer]. 1245 - CM updated patient and spouse - covered 100% for long term IV antibiotics.  
 
1300 - CM updated [facility] MD. 
 
 1305 - CM updated [facility] for home  Health - skilled nursing (CHF, LVAD, IV abx) and PT resumption of care after discharge. CM sent referral via [employer]. 1325 - CM updated [facility] NP. CM will continue to follow.  
 
Verena Stratton MPH

## 2020-07-29 NOTE — PROGRESS NOTES
0730:  Bedside shift change report given to Almas Chavez RN (oncoming nurse) by Davis Jean RN (offgoing nurse). Report included the following information SBAR, Kardex, Procedure Summary, Intake/Output, MAR, and Recent Results. 1030:  Patient ambulated with PT, up to chair. 1200:  Patient working with OT. 
 
1340: Lizama removed per orders. 1700:  Patient voided 50 ml, . 
 
1805:  Patient voided 100 mL, . 
 
1827:  Driveline dressing changed per protocol, gentamycin placed on site per orders. No drainage noted. 1930:  Bedside shift change report given to Davis Jean RN (oncoming nurse) by Almas Chavez RN (offgoing nurse). Report included the following information SBAR, Kardex, Procedure Summary, Intake/Output, MAR and Recent Results. Problem: Falls - Risk of 
Goal: *Absence of Falls Description: Document Treluis Agostoion Fall Risk and appropriate interventions in the flowsheet. Outcome: Progressing Towards Goal 
Note: Fall Risk Interventions: 
Mobility Interventions: Utilize gait belt for transfers/ambulation, Utilize walker, cane, or other assistive device, Strengthening exercises (ROM-active/passive), PT Consult for assist device competence, PT Consult for mobility concerns, Patient to call before getting OOB, OT consult for ADLs Mentation Interventions: Bed/chair exit alarm, Door open when patient unattended, Evaluate medications/consider consulting pharmacy, Toileting rounds Medication Interventions: Teach patient to arise slowly, Utilize gait belt for transfers/ambulation, Patient to call before getting OOB Elimination Interventions: Bed/chair exit alarm, Call light in reach, Patient to call for help with toileting needs, Stay With Me (per policy), Urinal in reach Problem: LVAD: Discharge Outcomes Goal: *Hemodynamically stable Outcome: Progressing Towards Goal 
  
Problem: Pressure Injury - Risk of 
Goal: *Prevention of pressure injury Description: Document Mich Scale and appropriate interventions in the flowsheet. Outcome: Progressing Towards Goal 
Note: Pressure Injury Interventions: 
Sensory Interventions: Assess changes in LOC Activity Interventions: Increase time out of bed Mobility Interventions: HOB 30 degrees or less, Pressure redistribution bed/mattress (bed type), Turn and reposition approx. every two hours(pillow and wedges) Nutrition Interventions: Document food/fluid/supplement intake Friction and Shear Interventions: Apply protective barrier, creams and emollients, Lift sheet, Minimize layers

## 2020-07-29 NOTE — PROGRESS NOTES
[address] [facility] Adult  Hospitalist Group Hospitalist Progress Note Simona Stevens MD 
Answering service: [phone] OR [phone] from in house phone Date of Service:  2020 NAME:  Kiko Gage :  1950 MRN:  [id number] Admission Summary:  
  
71year old male with past medical history heart failure, LVEF 15%, on LVAD, paroxysmal atrial fibrillation, BPH, recent admission for hematuria, presenting to [facility] with progressive and worsening confusion.  Patient seen and examined and currently tangential in thought process.  Discussed with wife Myles Romero, via phone.  Per wife, patient has been progressively confused during the past week.  Reports he has not slept for approximately 3 nights.  At times, patient is coherent but then intermittently confused. [facility] has been doing things out of the ordinary such as calling his children early in the morning or late at night.  Also calling his siblings multiple times.  Per wife, patient is [employer] but usually does not speak about God to others. Veronica Joseing my encounter, patient continuously spoke about the [place] in his past.  Patient alert to name, place, situation but not year.  Denies chest pain, shortness of breath, cough, fevers, chills, sweats, urinary frequency, dysuria, abdominal pain.  Endorses hematuria 
  
Interval history / Subjective:  
 
Patient is status post cystoscopy. No acute complaint. Assessment & Plan:  
 
Acute encephalopathy, 
-likely metabolic encephalopathy from infection, back to baseline Bacteremia 
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 
-Blood cultures continue to be positive 
-ID following  
-Antibiotic changed from Zosyn to meropenem  Chronic systolic heart failure EF 15%, s/p LVAD. -Advanced heart failure cardiology team on board -ECHO done EF <15% Acute renal failure 
-creatinine 1.88 on admission  
-Trending down and stable creatinine Paroxysmal atrial fibrillation  
-on digoxin 
-Monitor History of hematuria and anemia 
-Coumadin discontinued on previous hospitalization due to same 
-Urine cytology suspicious for high-grade urothelial carcinoma 
-Status post cystoscopy with finding of bladder tumor on the posterior wall, status post resection 
-To follow-up with urology as outpatient Anemia of chronic disease and Iron deficiency  
-Status post transfusion of 1 unit PRBCs - Monitor labs - Will transfuse if hgb <7.0  
- Received iron infusion - Nephrology following Hyponatremia - Improved - Monitor labs Seizure Disorder - Continue Keppra BPH 
-Continue Finasteride and Tamsulosin Depression 
-Continue Effexor- XR Moderate protein calorie malnutrition 
-Consult nutrition Code status: DNR  
DVT prophylaxis: SCDs Care Plan discussed with: Patient/Family and Nurse Anticipated Disposition: Home w/Family Anticipated Discharge: 24 hours to 48 hours Hospital Problems  Date Reviewed: 7/23/2020 Codes Class Noted POA Acute encephalopathy ICD-10-CM: G93.40 ICD-9-CM: 348.30  7/17/2020 Unknown Review of Systems: A comprehensive review of systems was negative except for that written in the HPI. Vital Signs:  
 Last 24hrs VS reviewed since prior progress note. Most recent are: 
Visit Vitals /90 (BP 1 Location: Right arm, BP Patient Position: At rest) Pulse 83 Temp 97.9 °F (36.6 °C) Resp 16 Ht 6' 2\" (1.88 m) Wt 77.4 kg (170 lb 10.2 oz) SpO2 100% BMI 21.91 kg/m² Intake/Output Summary (Last 24 hours) at 7/29/2020 1222 Last data filed at 7/29/2020 1123 Gross per 24 hour Intake 920 ml Output 2700 ml Net -1780 ml Physical Examination:  
 
 
     
Constitutional:  No acute distress, cooperative, pleasant, answers questions appropriately, appears stated age ENT:  Oral mucosa moist, oropharynx benign. Resp:  CTA bilaterally. No wheezing/rhonchi/rales. No accessory muscle use CV:  Regular irregular rhythm, normal rate, LVAD in place - Drive line noted to left lower abdomen GI:  Soft, non distended, non tender. , unable to auscultate bowel sounds due to LVAD. Musculoskeletal:  No edema, warm, 2+ pulses throughout Neurologic:  Moves all extremities with generalized weakness. AAOx2-3, CN II-XII reviewed Psych:  Depressive mood Skin:  Good turgor, no rashes or ulcers Data Review:  
 Review and/or order of clinical lab test 
Review and/or order of tests in the radiology section of CPT Review and/or order of tests in the medicine section of CPT Labs:  
 
Recent Labs  
  07/29/20 0314 07/28/20 0220 WBC 5.7 5.9 HGB 7.9* 7.5* HCT 24.6* 24.6*  
 169 Recent Labs  
  07/29/20 0314 07/28/20 0220 07/27/20 
0303  135* 136  
K 4.4 4.4 4.1  106 104 CO2 24 22 24 BUN 22* 24* 21* CREA 1.31* 1.34* 1.30 GLU 91 94 100 CA 8.5 8.6 8.6 MG 2.4 2.3 2.0 PHOS  --   --  2.8 Recent Labs  
  07/29/20 0314 07/28/20 0220 07/27/20 
0303 ALT 14 16 17 AP 90 92 94 TBILI 0.4 0.5 0.6 TP 7.1 7.0 7.0 ALB 2.7* 2.6* 2.8*  
GLOB 4.4* 4.4* 4.2* No results for input(s): INR, PTP, APTT, INREXT, INREXT in the last 72 hours. No results for input(s): FE, TIBC, PSAT, FERR in the last 72 hours. Lab Results Component Value Date/Time Folate 16.5 01/16/2020 04:57 AM  
  
No results for input(s): PH, PCO2, PO2 in the last 72 hours. No results for input(s): CPK, CKNDX, TROIQ in the last 72 hours. No lab exists for component: CPKMB Lab Results Component Value Date/Time  Cholesterol, total 90 10/26/2019 04:09 AM  
 HDL Cholesterol 21 10/26/2019 04:09 AM  
 LDL, calculated 56.2 10/26/2019 04:09 AM  
 Triglyceride 64 10/26/2019 04:09 AM  
 CHOL/HDL Ratio 4.3 10/26/2019 04:09 AM  
 
Lab Results Component Value Date/Time Glucose (POC) 119 (H) 07/18/2020 09:42 PM  
 Glucose (POC) 124 (H) 07/18/2020 04:45 PM  
 Glucose (POC) 116 (H) 07/18/2020 11:33 AM  
 Glucose (POC) 109 (H) 07/18/2020 06:13 AM  
 Glucose (POC) 99 01/27/2020 11:11 AM  
 
Lab Results Component Value Date/Time Color YELLOW/STRAW 07/21/2020 12:43 PM  
 Appearance CLOUDY (A) 07/21/2020 12:43 PM  
 Specific gravity 1.020 07/21/2020 12:43 PM  
 Specific gravity 1.015 10/31/2019 11:00 AM  
 pH (UA) 6.0 07/21/2020 12:43 PM  
 Protein 300 (A) 07/21/2020 12:43 PM  
 Glucose Negative 07/21/2020 12:43 PM  
 Ketone Negative 07/21/2020 12:43 PM  
 Bilirubin Negative 07/21/2020 12:43 PM  
 Urobilinogen 0.2 07/21/2020 12:43 PM  
 Nitrites Negative 07/21/2020 12:43 PM  
 Leukocyte Esterase MODERATE (A) 07/21/2020 12:43 PM  
 Epithelial cells MODERATE (A) 07/21/2020 12:43 PM  
 Bacteria Negative 07/21/2020 12:43 PM  
 WBC 10-20 07/21/2020 12:43 PM  
 RBC >100 (H) 07/21/2020 12:43 PM  
 
 
 
Medications Reviewed:  
 
Current Facility-Administered Medications Medication Dose Route Frequency  [Held by provider] magnesium oxide (MAG-OX) tablet 400 mg  400 mg Oral DAILY  hydrALAZINE (APRESOLINE) tablet 10 mg  10 mg Oral TID  gentamicin (GARAMYCIN) 0.1 % cream   Topical BID Mon Wed & Fri  
 meropenem (MERREM) 500 mg in 0.9% sodium chloride (MBP/ADV) 50 mL  500 mg IntraVENous Q6H  
 hydrALAZINE (APRESOLINE) 20 mg/mL injection 10 mg  10 mg IntraVENous Q4H PRN  
 epoetin yvonne-epbx (RETACRIT) injection 10,000 Units  10,000 Units SubCUTAneous Q MON, WED & FRI  
 0.9% sodium chloride infusion 250 mL  250 mL IntraVENous PRN  
 calcium polycarbophiL (FIBERCON) tablet 625 mg  1 Tab Oral DAILY  sodium chloride (NS) flush 5-40 mL  5-40 mL IntraVENous Q8H  
 sodium chloride (NS) flush 5-40 mL  5-40 mL IntraVENous PRN  
 acetaminophen (TYLENOL) tablet 650 mg  650 mg Oral Q4H PRN  
  ondansetron (ZOFRAN) injection 4 mg  4 mg IntraVENous Q4H PRN  
 bisacodyL (DULCOLAX) tablet 5 mg  5 mg Oral DAILY PRN  
 digoxin (LANOXIN) tablet 0.125 mg  0.125 mg Oral EVERY OTHER DAY  finasteride (PROSCAR) tablet 5 mg  5 mg Oral DAILY  lactobac ac& pc-s.therm-b.anim (TIAN Q/RISAQUAD)  1 Cap Oral DAILY  levETIRAcetam (KEPPRA) tablet 250 mg  250 mg Oral BID  tamsulosin (FLOMAX) capsule 0.8 mg  0.8 mg Oral QHS  venlafaxine-SR (EFFEXOR-XR) capsule 150 mg  150 mg Oral DAILY WITH BREAKFAST  
 
______________________________________________________________________ EXPECTED LENGTH OF STAY: 4d 16h ACTUAL LENGTH OF STAY:          9 Dl Shields MD

## 2020-07-29 NOTE — PROGRESS NOTES
Nephrology Progress Note [address] Date of Admission : 7/17/2020 CC: Follow up for JUAN J on CKD Assessment and Plan JUAN J on CKD 
- 2/2 volume depletion--> resolved - U/S neg for hydronephrosis . Gross hematuria resolved - Cr has been back to baseline and stable  
- he has been stable from renal stand point. I will follow distantly HFrEF: 
- EF 16-20%, NYHA Class IV , hx of VF arrest - s/p AICD 
- s/p LVAD placement on 11/18 
  
CKD Stage III  
- Mild Left renal atrophy at baseline  
- baseline Cr around 1.2 to 1.4 BPH w/ hx of urinary retention: 
- bladder scan and SC if residual > 500 
- on flomax and proscar 
  
Bladder tumor: 
- cysto and resection on 7/23 
- per urology PAF s/p DCCV 6/19 
  
Anemia: 
- IV x 3 doses 
- on PAVEL Pseudomonas bacteremia: 
- on meropenem  
- per ID Interval History: 
Seen and examined. Cr stable Urine clear No complaints . Current Medications: all current  Medications have been eviewed in [facility] hospitals [facility] the [place] Review of Systems: Pertinent items are noted in HPI. Objective: 
Vitals:   
Vitals:  
 07/28/20 1535 07/28/20 2005 07/28/20 2305 07/29/20 1073 BP:      
Pulse: 87 85 88 84 Resp: 20 18 18 18 Temp: 98.1 °F (36.7 °C) 98.2 °F (36.8 °C) 98.4 °F (36.9 °C) 98 °F (36.7 °C) SpO2: 100% 99% 99% 96% Weight:      
Height:      
 
Intake and Output: 
No intake/output data recorded. 07/27 1901 - 07/29 0700 In: 6409 [P.O.:1320; I.V.:150] Out: 2450 [Urine:2450] Physical Examination: 
 
General: NAD,Conversant Neck:  Supple, no mass Resp:  Lungs CTA B/L, no wheezing , normal respiratory effort CV:  RRR,  + VAD sounds, no LE edema GI:  Soft, NT, + Bowel sounds, no hepatosplenomegaly Neurologic:  Non focal 
Psych:             AAO x 3 appropriate affect Skin:  No Rash :  No neal []    High complexity decision making was performed 
[]    Patient is at high-risk of decompensation with multiple organ involvement Lab Data Personally Reviewed: I have reviewed all the pertinent labs, microbiology data and radiology studies during assessment. Recent Labs  
  07/29/20 0314 07/28/20 0220 07/27/20 
0303  135* 136  
K 4.4 4.4 4.1  106 104 CO2 24 22 24 GLU 91 94 100 BUN 22* 24* 21* CREA 1.31* 1.34* 1.30  
CA 8.5 8.6 8.6 MG 2.4 2.3 2.0 PHOS  --   --  2.8 ALB 2.7* 2.6* 2.8* ALT 14 16 17 Recent Labs  
  07/29/20 0314 07/28/20 0220 07/27/20 
0303 WBC 5.7 5.9 7.3 HGB 7.9* 7.5* 7.7* HCT 24.6* 24.6* 25.0*  
 169 170 No results found for: SDES Lab Results Component Value Date/Time Culture result: NO GROWTH AFTER 14 HOURS 07/28/2020 01:43 PM  
 Culture result: (A) 07/26/2020 01:23 PM  
  POSSIBLE PSEUDOMONAS SPECIES IDENTIFICATION AND SUSCEPTIBILITY TO FOLLOW GROWING IN 1 OF 4 BOTTLES DRAWN SITE = ( RAC ) Culture result: (A) 07/26/2020 01:23 PM  
  PRELIMINARY REPORT OF GRAM NEGATIVE RODS GROWING IN 1 OF 4 BOTTLES DRAWN CALLED TO AND READ BACK BY MS ROSALIND WILKES AT 2000 ON 7/27/20. PJ  
 Culture result: REMAINING BOTTLE(S) HAS/HAVE NO GROWTH SO FAR 07/26/2020 01:23 PM  
 
Recent Results (from the past 24 hour(s)) CULTURE, BLOOD, PAIRED Collection Time: 07/28/20  1:43 PM  
 Specimen: Blood Result Value Ref Range Special Requests: NO SPECIAL REQUESTS Culture result: NO GROWTH AFTER 14 HOURS    
DIGOXIN Collection Time: 07/29/20  3:14 AM  
Result Value Ref Range Digoxin level 0.8 (L) 0.90 - 2.00 NG/ML  
MAGNESIUM Collection Time: 07/29/20  3:14 AM  
Result Value Ref Range Magnesium 2.4 1.6 - 2.4 mg/dL METABOLIC PANEL, COMPREHENSIVE Collection Time: 07/29/20  3:14 AM  
Result Value Ref Range Sodium 136 136 - 145 mmol/L Potassium 4.4 3.5 - 5.1 mmol/L Chloride 105 97 - 108 mmol/L  
 CO2 24 21 - 32 mmol/L Anion gap 7 5 - 15 mmol/L Glucose 91 65 - 100 mg/dL BUN 22 (H) 6 - 20 MG/DL  Creatinine 1.31 (H) 0.70 - 1.30 MG/DL  
 BUN/Creatinine ratio 17 12 - 20 GFR est AA >60 >60 ml/min/1.73m2 GFR est non-AA 54 (L) >60 ml/min/1.73m2 Calcium 8.5 8.5 - 10.1 MG/DL Bilirubin, total 0.4 0.2 - 1.0 MG/DL  
 ALT (SGPT) 14 12 - 78 U/L  
 AST (SGOT) 11 (L) 15 - 37 U/L Alk. phosphatase 90 45 - 117 U/L Protein, total 7.1 6.4 - 8.2 g/dL Albumin 2.7 (L) 3.5 - 5.0 g/dL Globulin 4.4 (H) 2.0 - 4.0 g/dL A-G Ratio 0.6 (L) 1.1 - 2.2    
CBC W/O DIFF Collection Time: 07/29/20  3:14 AM  
Result Value Ref Range WBC 5.7 4.1 - 11.1 K/uL  
 RBC 2.57 (L) 4.10 - 5.70 M/uL HGB 7.9 (L) 12.1 - 17.0 g/dL HCT 24.6 (L) 36.6 - 50.3 % MCV 95.7 80.0 - 99.0 FL  
 MCH 30.7 26.0 - 34.0 PG  
 MCHC 32.1 30.0 - 36.5 g/dL  
 RDW 17.2 (H) 11.5 - 14.5 % PLATELET 009 381 - 929 K/uL MPV 10.0 8.9 - 12.9 FL  
 NRBC 0.0 0  WBC ABSOLUTE NRBC 0.00 0.00 - 0.01 K/uL LD Collection Time: 07/29/20  3:14 AM  
Result Value Ref Range  85 - 241 U/L  
NT-PRO BNP Collection Time: 07/29/20  3:14 AM  
Result Value Ref Range NT pro-BNP 4,695 (H) <125 PG/ML  
PROCALCITONIN Collection Time: 07/29/20  3:14 AM  
Result Value Ref Range Procalcitonin 0.30 ng/mL Natividad Rees MD 
[address] [place] [address], [address] [place], [facility] Phone - [phone] Fax - [phone] 
[email or web address]

## 2020-07-29 NOTE — PROGRESS NOTES
Physical Therapy Assisted pt with change over to batteries in preparation to ambulate with student PTA. Pt has difficulties with sequencing requiring v.c to place batteries in the clips and initiate with white. Pt also required v.c for correct insertion of power lead to batteries. Pt required assistance to secure batteries due to his normal vest not present.

## 2020-07-29 NOTE — PROGRESS NOTES
[facility] VAD/Heart Failure Progress Note Admit Date: 2020 POD:  6 Days Post-Op Procedure:  Procedure(s): 
CYSTO RETROGRADE PYELOGRAM/  BLADDER BIOPSY/ TURBT Subjective:  
Mild dyspnea, fatigue, and weakness; sleeping better; still PI events Objective:  
Vitals: 
Blood pressure 110/90, pulse 83, temperature 97.9 °F (36.6 °C), resp. rate 16, height 6' 2\" (1.88 m), weight 170 lb 10.2 oz (77.4 kg), SpO2 100 %. Temp (24hrs), Av.1 °F (36.7 °C), Min:97.9 °F (36.6 °C), Max:98.4 °F (36.9 °C) Hemodynamics: 
 CO:   
 CI:   
 CVP:   
 SVR:   
 PAP Systolic:   
 PAP Diastolic:   
 PVR:   
 CX36:   
 SCV02:   
 
VAD Interrogation: LVAD (Heartmate) Pump Speed (RPM): 9000 Pump Flow (LPM): 4.9 Chatter in Lines: No 
PI (Pulsitility Index): 3.4 Power: 4.8 MAP: 90  Test: Yes 
Back Up  at Bedside & Labeled: Yes Power Module Test: Yes Driveline Site Care: No 
Driveline Dressing: Clean, Dry, and Intact EKG/Rhythm:   
 
Extubation Date / Time:  
 
CT Output:  
 
Ventilator: 
  
 
Oxygen Therapy: 
Oxygen Therapy O2 Sat (%): 100 % (20 1123) Pulse via Oximetry: 80 beats per minute (20 0520) O2 Device: Room air (20 1123) O2 Flow Rate (L/min): 2 l/min (20 0321) CXR: 
 
Admission Weight: Last Weight Weight: 175 lb 0.7 oz (79.4 kg) Weight: 170 lb 10.2 oz (77.4 kg) Intake / Output / Drain: 
Current Shift: 701 -  1900 In: 680 [P.O.:480; I.V.:200] Out: 350 [Urine:350] Last 24 hrs.:  
 
Intake/Output Summary (Last 24 hours) at 2020 1236 Last data filed at 2020 1123 Gross per 24 hour Intake 920 ml Output 2700 ml Net -1780 ml No results for input(s): CPK, CKMB, TROIQ in the last 72 hours. Recent Labs  
  20 
0314 20 
0220 20 
0303  135* 136  
K 4.4 4.4 4.1 CO2 24 22 24 BUN 22* 24* 21* CREA 1.31* 1.34* 1.30 GLU 91 94 100 PHOS  --   --  2.8 MG 2.4 2.3 2.0 WBC 5.7 5.9 7.3 HGB 7.9* 7.5* 7.7* HCT 24.6* 24.6* 25.0*  
 169 170 No results for input(s): INR, PTP, APTT, INREXT in the last 72 hours. No lab exists for component: PBNP Current Facility-Administered Medications:  
  [Held by provider] magnesium oxide (MAG-OX) tablet 400 mg, 400 mg, Oral, DAILY, Lucyann Sox, NP 
  hydrALAZINE (APRESOLINE) tablet 10 mg, 10 mg, Oral, TID, Lucyann Sox, NP, 10 mg at 07/29/20 0847 
  gentamicin (GARAMYCIN) 0.1 % cream, , Topical, BID Mon Wed & Fri, Lucyann Sox, NP 
  meropenem (MERREM) 500 mg in 0.9% sodium chloride (MBP/ADV) 50 mL, 500 mg, IntraVENous, Q6H, Geoff Godfrey MD, Last Rate: 100 mL/hr at 07/29/20 0847, 500 mg at 07/29/20 0847   hydrALAZINE (APRESOLINE) 20 mg/mL injection 10 mg, 10 mg, IntraVENous, Q4H PRN, Lucyann Sox, NP, 10 mg at 07/27/20 2036   epoetin yvonne-epbx (RETACRIT) injection 10,000 Units, 10,000 Units, SubCUTAneous, Q MON, WED & FRI, Morro Neville MD, 10,000 Units at 07/27/20 2036 
  0.9% sodium chloride infusion 250 mL, 250 mL, IntraVENous, PRN, Levi Shana B, NP 
  calcium polycarbophiL (FIBERCON) tablet 625 mg, 1 Tab, Oral, DAILY, Lucyann Sox E, NP, 625 mg at 07/29/20 0847 
  sodium chloride (NS) flush 5-40 mL, 5-40 mL, IntraVENous, Q8H, Hyun Zhao, DO, 10 mL at 07/29/20 3340   sodium chloride (NS) flush 5-40 mL, 5-40 mL, IntraVENous, PRN, Annette Kennedyoln M, DO 
  acetaminophen (TYLENOL) tablet 650 mg, 650 mg, Oral, Q4H PRN, Annette FRENCH, DO, 650 mg at 07/27/20 2322   ondansetron (ZOFRAN) injection 4 mg, 4 mg, IntraVENous, Q4H PRN, Annette FRENCH, DO, 4 mg at 07/22/20 7888   bisacodyL (DULCOLAX) tablet 5 mg, 5 mg, Oral, DAILY PRN, Annette FRENCH, DO 
  digoxin (LANOXIN) tablet 0.125 mg, 0.125 mg, Oral, EVERY OTHER DAY, Annette FRENCH, , 0.125 mg at 07/28/20 1257   finasteride (PROSCAR) tablet 5 mg, 5 mg, Oral, DAILY, Zhao [employer], DO, 5 mg at 07/29/20 3908   lactobac ac& pc-s.therm-b.anim (TIAN Q/RISAQUAD), 1 Cap, Oral, DAILY, Geo Vargassydni [employer], DO, 1 Cap at 07/29/20 4549   levETIRAcetam (KEPPRA) tablet 250 mg, 250 mg, Oral, BID, Opal Warner M, DO, 250 mg at 07/29/20 5281   tamsulosin (FLOMAX) capsule 0.8 mg, 0.8 mg, Oral, QHS, Hyun Zhao M, DO, 0.8 mg at 07/28/20 2250 
  venlafaxine-SR (EFFEXOR-XR) capsule 150 mg, 150 mg, Oral, DAILY WITH BREAKFAST, Hyun Zhao M, DO, 150 mg at 07/29/20 2391 A/P 
S/P LVAD - good flows Need for anticoagulation - on hodl due to recurrent bleeds UTI - abx's Confusion - monitor 
  
Risk of morbidity and mortality - high Medical decision making - high complexity Signed By: Josue Chin MD

## 2020-07-29 NOTE — PROGRESS NOTES
Problem: Self Care Deficits Care Plan (Adult) Goal: *Acute Goals and Plan of Care (Insert Text) Description:  
FUNCTIONAL STATUS PRIOR TO ADMISSION: Patient was modified independent using a rolling walker for functional mobility. Pt would use W/C for longer distances, to MD appts, etc. Pt lives with attentive and supportive spouse. She has been assisting pt with some switchovers, placing batteries, etc. But stated pt can perform on his own. Recent falls HOME SUPPORT: The patient lived with wife who assisted pt as needed. Pt sponge bathed as he has not yet received a LVAD shower bag Occupational Therapy Goals Initiated 7/27/2020 1. Patient will perform standing ADLs at sink x 5 minutes with least restrictive DME with supervision/set-up within 7 day(s). 2.  Patient will perform upper body dressing and lower body dressing with supervision/set-up within 7 day(s). 3.  Patient will perform bathing with supervision/set-up within 7 day(s). 4.  Patient will perform toilet transfers in bathroom with least restrictive DME with supervision/set-up within 7 day(s). 5.  Patient will perform all aspects of toileting with supervision/set-up within 7 day(s). 7.  Patient will perform switchover PM> batteries with independence within 7 days. Outcome: Progressing Towards Goal 
 OCCUPATIONAL THERAPY TREATMENT Patient: Jethro Castillo (75 y.o. male) Date: 7/29/2020 Diagnosis: Acute encephalopathy [G93.40] Acute encephalopathy [G93.40] <principal problem not specified> Procedure(s) (LRB): 
CYSTO RETROGRADE PYELOGRAM/  BLADDER BIOPSY/ TURBT (N/A) 6 Days Post-Op Precautions: Fall(LVAD) Chart, occupational therapy assessment, plan of care, and goals were reviewed. ASSESSMENT Patient continues with skilled OT services and is progressing towards goals. Patient received sitting up in bedside chair, receptive to therapy.  This session, patient able to ambulate to bathroom and stand at sink for approx 4 minutes during light upper body grooming ADLs. Patient requiring cues for hand placement during transfers as well as for RW placement and squaring up at the sink. Consistent CGA and high guard required for balance, with patient increasingly unsteady with fatigue when returning to chair. Continue to recommend [facility] services and supervision/assist for functional mobility and ADLs at discharge. Current Level of Function Impacting Discharge (ADLs): CGA-min assist for transfers and functional mobility; set-up and CGA for standing ADLs at sink PLAN : 
Patient continues to benefit from skilled intervention to address the above impairments. Continue treatment per established plan of care. to address goals. Recommend with staff: OOB to chair and toileting in bathroom with RW and min assist; mobilization TID Recommend next OT session: standing tolerance, LVAD specifics Recommendation for discharge: (in order for the patient to meet his/her long term goals) Occupational therapy at least 2 days/week in the home AND ensure assist and/or supervision for safety with functional mobility and ADLs This discharge recommendation: 
Has been made in collaboration with the attending provider and/or case management IF patient discharges home will need the following DME: none SUBJECTIVE:  
Patient stated i'm doing pretty good.  OBJECTIVE DATA SUMMARY:  
Cognitive/Behavioral Status: 
Neurologic State: Alert; Appropriate for age Orientation Level: Oriented X4 Cognition: Follows commands; Appropriate for age attention/concentration Perception: Appears intact Perseveration: No perseveration noted Safety/Judgement: Fall prevention;Home safety; Awareness of environment Functional Mobility and Transfers for ADLs: 
Bed Mobility: 
Supine to Sit: Supervision Transfers: 
Sit to Stand: Contact guard assistance Stand to Sit: Contact guard assistance Functional Transfers Bathroom Mobility: Contact guard assistance Balance: 
Sitting: Intact Standing: Impaired; With support Standing - Static: Constant support;Good Standing - Dynamic : Constant support; Saloni Cheek ADL Intervention: 
Grooming Grooming Assistance: Contact guard assistance;Set-up Position Performed: Standing Washing Face: Contact guard assistance Washing Hands: Contact guard assistance Cues: Verbal cues provided; Tactile cues provided Cognitive Retraining Safety/Judgement: Fall prevention;Home safety; Awareness of environment Therapeutic Exercises:  
Patient tolerated 4 minutes standing at bathroom sink for light upper body grooming ADLs. Pt using sink for support and balance. Increasingly unsteady with fatigue, particularly during ambulation back to chair. Pain: 
None reported. Activity Tolerance:  
Fair and requires rest breaks Please refer to the flowsheet for vital signs taken during this treatment. After treatment patient left in no apparent distress:  
Sitting in chair and Call bell within reach COMMUNICATION/COLLABORATION:  
The patients plan of care was discussed with: Physical therapist and Registered nurse. Mode Pereira OT Time Calculation: 13 mins

## 2020-07-29 NOTE — PROGRESS NOTES
40[address] [facility] 904 [facility] in [place], [place] Inpatient Progress Note Patient name: Lillian Phelps Patient : 1950 Patient MRN: [id number] Attending MD: Jeff Caro MD 
Date of service: 20 REASON FOR CONSULT: 
UTI in patient with LVAD PROCEDURE PERFORMED:  Cystoscopy, transurethral resection of large bladder tumor.  
POD 5 
 
24hr Events: NTproBNP 4695 MAPs 80-96mmHg Neal draining- yellow urine PLAN: 
LVAD device high speed at 6000rpm and low speed at 5600rpm 
Cannot tolerate beta-blockers d/t severe RV failure Cannot tolerate ACE/ARB/ARNi due to persistent orthostasis despite increased fluid intake  
Cannot tolerate spironolactone due to IVVD and hyponatremia Continue digoxin 0.125 every other day, level 0.7; trend levels daily Monitor daily CardioMEMs readings, 16mmHg (20) hydralazine 10mg po TID, keep MAPs 70-90mmHg LVEDD > 7cm; consider RHC to determine device speed Unable to tolerate anticoagulation due to hematuria; stable LDH off aspirin UA (20) large blood, RBC >100, mod leukocytes Urine cytology; pathology revealing: Papillary urothelial carcinoma, high-grade, with lamina propria invasion, No muscularis propria present for evaluation Diagnostic cystoscopy and TURBT (20) -  resection of large papillary and solid bladder tumor on posterior wall Per urology may require repeat resection in 3-4 weeks based on path report Can D/C neal, void trial, replace neal for PVR > 350ml Nephrology consult appreciated 
bladder and renal US (20) No evidence for hydronephrosis or mass lesion, small left kidney Urology consult appreciated Manual bladder irrigation prn, gross hematuria w/ clots D/C IV zosyn (20) and changed to IV merrem per ID  
ID consult appreciated, assistance on ABX regimen Blood cultures (20) NGTD Blood cultures (20) 1/4 bottles +GNRs Blood cultures (7/24/20) Pseudomonas in 2/4 bottles - susceptibilities available Wound culture (7/20/20) light staph aureus Gentamicin to Drive line exit site with daily dressing changes PICC placement for long term IV ABX Elevated sed rate- 109 (7/22/20) Continue soren Q po BID Fiber con 625 po daily Nutrition consult, appreciate recs Continue keppra,  Level 15.5 (7/18/20) D/C IV benadryl due to disorientation/confusion Hospitalist assistance appreciated DNR Daily weights, regular cardiac diet, strict I/O Trend CBC, CMP, NTproBNP, Mag, LD, procalcitonin, digoxin Dispo: Remain in [facility], anticipate D/C home with home health and PT with long term antibiotics by end of this week.     
  
IMPRESSION: 
Bladder Carcinoma Gross Hematuria UTI Metabolic encephalopathy Chronic systolic heart failure Stage D, NYHA class II symptoms Coronary artery disease Ischemic cardiomyopathy, LVEF 15% S/p HM3 LVAD as destination therapy (11/18/19 by Dr. Courtney Lynn) S/p Impella 5.0 as bridge to LVAD 
HTN, goal MAP 70-90mmHg H/o VT s/p [facility] BIV-ICD PAF 
H/o recurrent UTI, pseudomonal infection Unable to tolerate AC due to hematuria MSSA drive line infection COPD 
JOSE Essential tremor on keppra Depression on effexor Persistently elevated CEA PET scan increased RML activity, not malignant per hemonc Orthostatic hypotension DNR Up-to-date with PNA vaccine - per PCP/wife 
2/4bottles blood cultures +pseudomonas (7/24/20) diarrhea Patient seen and examined. Data and note reviewed. I have discussed and agree with the plans as noted. 71year old male with a history of ICM s/p LVAD as DT who was admitted with Pseudomonas bacteremia. His blood cultures have not cleared despite appropriate antibiotics, which indicates that his LVAD pump is seeded. He is not a candidate for LVAD replacement due to multiple co-morbidities.  Baudilio Doll will require lifelong IV antibiotics as an outpatient. Will place PICC line and arrange for outpatient infusion. Awaiting a call back from urology to discuss treatment options for his high grade bladder carcinoma. Thank you for allowing us to participate in your patient's care. Mounika Francois MD, Trudy Rehman Chief of Cardiology, [facility] Medical Director [facility] [address], [address] Office [phone] Fax [phone] CARDIAC IMAGING: 
TTE 4/20 shows large LVIDd, 6.84 cm, RVIDd 3.06 cm, TAPSE 1.15 cm, severely elevated CVP 
TTE 7/6/20- Mild AI, LViDd 6.91cm, RVIDd 5.16cm, TAPSE 1.39cm Echo (7/18/20) · Contrast used: DEFINITY. · Image quality for this study was technically difficult. · Severely dilated left ventricle. Wall thickness appears thin. Severe systolic function. Estimated left ventricular ejection fraction is <15%. Visually measured ejection fraction. · Moderately dilated left atrium. · Moderately dilated right ventricle. Moderately to severely reduced systolic function. · Dilated right atrium. · Moderate mitral valve prolapse. 
  
 
LVAD INTERROGATION: 
Device interrogated in person, Increased Speed to 6000rpm, low speed to 5600rpm 
Device function normal, normal flow, multiple PI events LVAD Pump Speed (RPM): 9000 Pump Flow (LPM): 4.9 MAP: 101 PI (Pulsitility Index): 3.4 Power: 4.8  Test: Yes 
Back Up  at Bedside & Labeled: Yes Power Module Test: Yes Driveline Site Care: No 
Driveline Dressing: Clean, Dry, and Intact Outpatient: No 
MAP in Therapeutic Range (Outpatient): Yes Testing Alarms Reviewed: Yes 
Back up SC speed: 600 Back up Low Speed Limit: 5600 Emergency Equipment with Patient?: Yes Emergency procedures reviewed?: Yes Drive line site inspected?: Yes Drive line intergrity inspected?: Yes Drive line dressing changed?: No 
 
PHYSICAL EXAM: 
 Visit Vitals /90 (BP 1 Location: Right arm, BP Patient Position: At rest) Pulse 83 Temp 97.9 °F (36.6 °C) Resp 16 Ht 6' 2\" (1.88 m) Wt 170 lb 10.2 oz (77.4 kg) SpO2 100% BMI 21.91 kg/m² Review of Systems Constitutional: Negative for chills, fever, malaise/fatigue and weight loss. I feel good today, just finished walking HENT: Positive for hearing loss. Eyes: Negative. Respiratory: Positive for shortness of breath. Improved Cardiovascular: Negative for chest pain, palpitations, orthopnea and leg swelling. Gastrointestinal: Negative. Genitourinary: Negative for dysuria, flank pain, hematuria and urgency. Musculoskeletal: Negative. Skin: Negative. Neurological: Positive for weakness. Endo/Heme/Allergies: Bruises/bleeds easily. Psychiatric/Behavioral: Positive for depression. Physical Exam 
Vitals signs and nursing note reviewed. Constitutional:   
   General: He is not in acute distress. Appearance: He is ill-appearing. Comments: Sitting up in chair eating HENT:  
   Head: Normocephalic. Mouth/Throat:  
   Mouth: Mucous membranes are moist.  
Neck:  
   Vascular: No JVD. Cardiovascular:  
   Rate and Rhythm: Normal rate and regular rhythm. Heart sounds: Murmur present. Comments: +lvad hum Pulmonary:  
   Effort: Pulmonary effort is normal.  
   Breath sounds: Normal breath sounds. Abdominal:  
   General: Bowel sounds are normal.  
   Palpations: Abdomen is soft. Genitourinary: 
   Comments: Lizama catheter draining clear yellow Musculoskeletal:  
   Right lower leg: Edema present. Left lower leg: Edema present. Skin: 
   General: Skin is warm and dry. Capillary Refill: Capillary refill takes 2 to 3 seconds. Findings: Bruising present. Comments: Scattered bruising on extremities Neurological:  
   Mental Status: He is alert and oriented to person, place, and time. Motor: Weakness present. Psychiatric:     
   Attention and Perception: Attention normal.     
   Mood and Affect: Mood normal.     
   Speech: Speech normal.     
   Behavior: Behavior normal. Behavior is cooperative. Thought Content: Thought content normal.  
 
 
 
 
 
PAST MEDICAL HISTORY: 
Past Medical History:  
Diagnosis Date  BPH (benign prostatic hyperplasia)  [facility]F (congestive heart failure) ([facility]  Degenerative disc disease, lumbar  Heart failure ([facility]  High cholesterol  Hypertension  Ischemic cardiomyopathy  LVAD (left ventricular assist device) present ([facility]  Paroxysmal atrial fibrillation ([facility] 4/2/2019  Spinal stenosis PAST SURGICAL HISTORY: 
Past Surgical History:  
Procedure Laterality Date  COLONOSCOPY Left 11/11/2019 COLONOSCOPY at bedside performed by Dylon Stewart MD at [facility] 391  HX CORONARY ARTERY BYPASS GRAFT    
 triple  HX HEART ASSIST DEVICE Left 10/29/2019 Impella 5.0  
 HX HEART ASSIST DEVICE Left 11/18/2019 HeartMate 3  
 HX HERNIA REPAIR    
 HX IMPLANTABLE CARDIOVERTER DEFIBRILLATOR  HX THROMBECTOMY Left 11/25/2019 Left brachial thrombectomy  MO CARDIOVERSION ELECTIVE ARRHYTHMIA EXTERNAL N/A 6/10/2019 EP CARDIOVERSION performed by Anna Bolden MD at [place] [address] CATH LAB  MO CARDIOVERSION ELECTIVE ARRHYTHMIA EXTERNAL N/A 6/18/2019 EP CARDIOVERSION performed by Malinda Caldwell MD at [facility] [place] [address], [address] CATH LAB  MO INSJ/RPLCMT PERM DFB W/TRNSVNS LDS 1/DUAL CHMBR N/A 6/21/2019 INSERT ICD BIV MULTI performed by Steven Araujo MD at [place] [address], [address] CATH LAB  MO TCAT IMPL WRLS P-ART PRS SNR L-T HEMODYN MNTR N/A 9/18/2019 IMPLANT HEART FAILURE MONITORING DEVICE performed by Michelle Steve MD at [place] [address] CATH LAB FAMILY HISTORY: 
Family History Problem Relation Age of Onset  Lung Disease Mother  Hypertension Mother Jessenia Arthritis-osteo Mother  Heart Disease Mother  Heart Disease Father  Diabetes Father SOCIAL HISTORY: 
Social History Socioeconomic History  Marital status:  Spouse name: Not on file  Number of children: Not on file  Years of education: Not on file  Highest education level: Not on file Tobacco Use  Smoking status: Former Smoker Last attempt to quit: 2010 Years since quittin.6  Smokeless tobacco: Never Used Substance and Sexual Activity  Alcohol use: Not Currently Comment: rarely  Drug use: Never Other Topics Concern LABORATORY RESULTS: 
  
Labs Latest Ref Rng & Units 2020 WBC 4.1 - 11.1 K/uL 5.7 5.9 7.3 - 6.1 7.4 6.9  
RBC 4.10 - 5.70 M/uL 2.57(L) 2.54(L) 2.60(L) - 2.47(L) 2.47(L) 2.58(L) Hemoglobin 12.1 - 17.0 g/dL 7. 9(L) 7. 5(L) 7. 7(L) 7. 6(L) 7. 4(L) 7. 3(L) 7. 7(L) Hematocrit 36.6 - 50.3 % 24. 6(L) 24. 6(L) 25. 0(L) - 24. 2(L) 24. 3(L) 24. 7(L) MCV 80.0 - 99.0 FL 95.7 96.9 96.2 - 98.0 98.4 95.7 Platelets 017 - 978 K/uL 179 169 170 - 180 202 191 Lymphocytes 12 - 49 % - - - - - 11(L) - Monocytes 5 - 13 % - - - - - 6 - Eosinophils 0 - 7 % - - - - - 2 - Basophils 0 - 1 % - - - - - 1 - Albumin 3.5 - 5.0 g/dL 2. 7(L) 2. 6(L) 2. 8(L) - 2. 7(L) 2. 8(L) 2. 8(L) Calcium 8.5 - 10.1 MG/DL 8.5 8.6 8.6 - 8.7 8.6 8.5 Glucose 65 - 100 mg/dL 91 94 100 - 96 95 110(H) BUN 6 - 20 MG/DL 22(H) 24(H) 21(H) - 23(H) 30(H) 29(H) Creatinine 0.70 - 1.30 MG/DL 1.31(H) 1.34(H) 1.30 - 1.27 1.62(H) 1.62(H) Sodium 136 - 145 mmol/L 136 135(L) 136 - 137 137 135(L) Potassium 3.5 - 5.1 mmol/L 4.4 4.4 4.1 - 3.9 3.9 4.4 TSH 0.36 - 3.74 uIU/mL - - - - - - -  
PSA 0.01 - 4.0 ng/mL - - - - - - -  
LDH 85 - 241 U/L 191 184 167 - 166 204 171 Some recent data might be hidden Lab Results Component Value Date/Time  TSH 1.13 2020 11:28 AM  
 TSH 1.70 2020 01:59 PM  
 TSH 2.30 12/03/2019 03:29 AM  
 TSH 2.40 10/25/2019 07:39 PM  
 TSH 2.45 06/01/2019 04:16 AM  
 
 
ALLERGY: 
Allergies Allergen Reactions  Cefepime Other (comments)  
  myoclonus CURRENT MEDICATIONS: 
 
Current Facility-Administered Medications:  
  [Held by provider] magnesium oxide (MAG-OX) tablet 400 mg, 400 mg, Oral, DAILY, Petty Gomez, NP 
  hydrALAZINE (APRESOLINE) tablet 10 mg, 10 mg, Oral, TID, Petty Gomez, NP, 10 mg at 07/29/20 0847 
  gentamicin (GARAMYCIN) 0.1 % cream, , Topical, BID Mon Wed & Fri, Petty Gomez NP 
  meropenem (MERREM) 500 mg in 0.9% sodium chloride (MBP/ADV) 50 mL, 500 mg, IntraVENous, Q6H, Geoff Godfrey MD, Last Rate: 100 mL/hr at 07/29/20 0847, 500 mg at 07/29/20 0847   hydrALAZINE (APRESOLINE) 20 mg/mL injection 10 mg, 10 mg, IntraVENous, Q4H PRN, Petty Gomez NP, 10 mg at 07/27/20 2036   epoetin yvonne-epbx (RETACRIT) injection 10,000 Units, 10,000 Units, SubCUTAneous, Q MON, WED & FRI, Yaron Shelley MD, 10,000 Units at 07/27/20 2036 
  0.9% sodium chloride infusion 250 mL, 250 mL, IntraVENous, PRN, Shana Sims B, NP 
  calcium polycarbophiL (FIBERCON) tablet 625 mg, 1 Tab, Oral, DAILY, Petty Gomez E, NP, 625 mg at 07/29/20 0847 
  sodium chloride (NS) flush 5-40 mL, 5-40 mL, IntraVENous, Q8H, Hyun Zhao M, DO, 10 mL at 07/29/20 6181   sodium chloride (NS) flush 5-40 mL, 5-40 mL, IntraVENous, PRN, Dameon Hayes M, DO 
  acetaminophen (TYLENOL) tablet 650 mg, 650 mg, Oral, Q4H PRN, Jerryl Abigail M, DO, 650 mg at 07/27/20 2322   ondansetron (ZOFRAN) injection 4 mg, 4 mg, IntraVENous, Q4H PRN, Reginoryl Friendly M, DO, 4 mg at 07/22/20 8291   bisacodyL (DULCOLAX) tablet 5 mg, 5 mg, Oral, DAILY PRN, Umeshl Friendly M, DO 
  digoxin (LANOXIN) tablet 0.125 mg, 0.125 mg, Oral, EVERY OTHER DAY, Dameon Hayes M, DO, 0.125 mg at 07/28/20 3274   finasteride (PROSCAR) tablet 5 mg, 5 mg, Oral, DAILY, Zhao, [employer], DO, 5 mg at 07/29/20 1856   lactobac ac& pc-s.therm-b.anim (TIAN Q/RISAQUAD), 1 Cap, Oral, DAILY, Sebeka Liam [employer], DO, 1 Cap at 07/29/20 1478   levETIRAcetam (KEPPRA) tablet 250 mg, 250 mg, Oral, BID, Vineet Lott M, DO, 250 mg at 07/29/20 5868   tamsulosin (FLOMAX) capsule 0.8 mg, 0.8 mg, Oral, QHS, Hyun Zhao M, DO, 0.8 mg at 07/28/20 2250 
  venlafaxine-SR (EFFEXOR-XR) capsule 150 mg, 150 mg, Oral, DAILY WITH BREAKFAST, Hyun Zhao M, DO, 150 mg at 07/29/20 0731 Thank you for allowing me to participate in this patient's care. TIERRA Henry [address] [facility], [address] Phone: [phone] Fax: [phone]

## 2020-07-29 NOTE — PROGRESS NOTES
ID Progress Note 2020 Subjective:  
 
Doing better overall, still fatigues easily--somewhat depressed, no physical complaints, however. Cystoscopy with removal of lesion--high-grade malignancy, apparently Objective: Antibiotics: 1. Zosyn Vitals:  
Visit Vitals /90 (BP 1 Location: Right arm, BP Patient Position: At rest) Pulse 83 Temp 98.1 °F (36.7 °C) Resp 16 Ht 6' 2\" (1.88 m) Wt 77.4 kg (170 lb 10.2 oz) SpO2 100% BMI 21.91 kg/m² Tmax:  Temp (24hrs), Av.1 °F (36.7 °C), Min:97.9 °F (36.6 °C), Max:98.4 °F (36.9 °C) Exam:  Lungs:  clear to auscultation bilaterally Heart:  regular rate and rhythm Abdomen:  soft, non-tender. Bowel sounds normal. No masses,  no organomegaly Labs:     
Recent Labs  
  20 
0314 20 
0220 20 
0303 WBC 5.7 5.9 7.3 HGB 7.9* 7.5* 7.7*  169 170 BUN 22* 24* 21* CREA 1.31* 1.34* 1.30 AP 90 92 94 TBILI 0.4 0.5 0.6 Cultures: No results found for: SDES Lab Results Component Value Date/Time Culture result: NO GROWTH AFTER 14 HOURS 2020 01:43 PM  
 Culture result: (A) 2020 01:23 PM  
  PSEUDOMONAS AERUGINOSA GROWING IN 1 OF 4 BOTTLES DRAWN (SITE = [facility]) Culture result: (A) 2020 01:23 PM  
  PRELIMINARY REPORT OF GRAM NEGATIVE RODS GROWING IN 1 OF 4 BOTTLES DRAWN CALLED TO AND READ BACK BY MS BOYER RN AT 2000 ON 20. PJ  
 Culture result: REMAINING BOTTLE(S) HAS/HAVE NO GROWTH SO FAR 2020 01:23 PM  
 
 
Radiology:  
 
Line/Insert Date:        
 
Assessment: 1. Drive line infection 2. Recurrent Pseudomonas bacteremia--persistent 3. Organism resistant to oral antibiotic options 4. Options include--lifelong antibiotic therapy, move LVAD or Hospice Objective: 1. Continue Merrem in face of ongoing bacteremia 2. Case management looking into coverage options Devora Jean MD

## 2020-07-29 NOTE — PROGRESS NOTES
Problem: Mobility Impaired (Adult and Pediatric) Goal: *Acute Goals and Plan of Care (Insert Text) Description: FUNCTIONAL STATUS PRIOR TO ADMISSION: Patient was modified independent using a rolling walker for functional mobility. Patient reports x 3 falls within the last 3 months (2* dizziness/lightheadedness). Patient's wife assists with LVAD power-transitions as needed. Patient reports ability to dress self (seated) and bathe (seated). Patient nocturnally wears a CPAP vs. 2 L/min NC. Patient received his LVAD in 2019. 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 HOME SUPPORT PRIOR TO ADMISSION: The patient lived with his wife, but did not require assistance with mobility. Patient did utilize a wheelchair for [facility], specifically MD appointments. Physical Therapy Goals Initiated 7/27/2020 1. Patient will move from supine to sit and sit to supine, scoot up and down, and roll side to side in bed with modified independence within 7 days. 2.  Patient will perform sit to/from stand with modified independence within 7 days. 3.  Patient will ambulate 150 feet with least restrictive assistive device and modified independence within 7 days. 4.  Patient will ascend/descend 4 stairs with single handrail with supervision within 7 days. 5.  Patient will perform power exchange for power module to/from battery with supervision within 7 days. Outcome: Progressing Towards Goal 
 
PHYSICAL THERAPY TREATMENT Patient: Ede Jarquin (75 y.o. male) Date: 7/29/2020 Diagnosis: Acute encephalopathy [G93.40] Acute encephalopathy [G93.40] <principal problem not specified> Procedure(s) (LRB): 
CYSTO RETROGRADE PYELOGRAM/  BLADDER BIOPSY/ TURBT (N/A) 6 Days Post-Op Precautions: Fall(LVAD) Chart, physical therapy assessment, plan of care and goals were reviewed. ASSESSMENT Patient continues with skilled PT services and is progressing towards goals. Pt received in bed and eager to ambulate. Pt completed sit<>stand transfers with SBA and ambulated to restroom with CGA. Pt continued ambulation in hallway with one vc for RW placement around obstacle. Pt ambulates with narrowed CHHAYA and requires standing rest breaks. Pt returned to chair post activity on LVAD batteries. Current Level of Function Impacting Discharge (mobility/balance): CGA Other factors to consider for discharge: safety, some confusion PLAN : 
Patient continues to benefit from skilled intervention to address the above impairments. Continue treatment per established plan of care. to address goals. Recommendation for discharge: (in order for the patient to meet his/her long term goals) Physical therapy at least 2 days/week in the home AND ensure assist and/or supervision for safety with functional mobility as needed This discharge recommendation: 
Has not yet been discussed the attending provider and/or case management IF patient discharges home will need the following DME: none SUBJECTIVE:  
Patient stated my legs are holding up.  OBJECTIVE DATA SUMMARY:  
Critical Behavior: 
Neurologic State: Alert Orientation Level: Appropriate for age, Oriented X4 Cognition: Appropriate decision making, Appropriate for age attention/concentration, Appropriate safety awareness, Recognition of people/places Safety/Judgement: Awareness of environment Functional Mobility Training: 
Bed Mobility: 
  
Supine to Sit: Supervision Transfers: 
Sit to Stand: Stand-by assistance Stand to Sit: Stand-by assistance Balance: 
Sitting: Intact Standing: Impaired; With support Standing - Static: Constant support;Good Standing - Dynamic : Constant support; [address] Ambulation/Gait Training: 
Distance (ft): 100 Feet (ft) Assistive Device: Gait belt;Walker, rolling Ambulation - Level of Assistance: Contact guard assistance Gait Abnormalities: Decreased step clearance Base of Support: Narrowed Speed/Jacqueline: Slow Step Length: Left shortened;Right shortened Pain Rating: 
none Activity Tolerance:  
Fair and requires rest breaks Please refer to the flowsheet for vital signs taken during this treatment. After treatment patient left in no apparent distress:  
Sitting in chair and Call bell within reach COMMUNICATION/COLLABORATION:  
The patients plan of care was discussed with: Registered nurse. ZULMA Hamilton Time Calculation: 24 mins

## 2020-07-30 NOTE — PROGRESS NOTES
Murali Martínez RN to check status on Kit Fuller Cranker regarding PICC placement. Will hold off on placing the PICC today and check back tomorrow.

## 2020-07-30 NOTE — PROGRESS NOTES
Stable from renal stand point Will follow him distantly Jimmy Lizama MD 
[facility] Office :[phone] Fax: [phone]

## 2020-07-30 NOTE — PROGRESS NOTES
Epistaxis, both nares, overnight. Saline soaked gauze changed prn. Patient did not sleep overnight. Moaning, denies pain. Bladder checks showed 380 post void residual after episode of incontinence, placed neal because residuals shown >350 (patient is 6-7days post bladder surgery/carcinoma removal)

## 2020-07-30 NOTE — PROGRESS NOTES
[facility]  Hospitalist Group Hospitalist Progress Note Meryle Dixie, MD 
Answering service: [phone] OR [phone] from in house phone Date of Service:  2020 NAME:  Rupesh Cunningham :  1950 MRN:  [id number] Admission Summary:  
  
71year old male with past medical history heart failure, LVEF 15%, on LVAD, paroxysmal atrial fibrillation, BPH, recent admission for hematuria, presenting to [facility] with progressive and worsening confusion.  Patient seen and examined and currently tangential in thought process.  Discussed with wife Maria Teresa Llanes, via phone.  Per wife, patient has been progressively confused during the past week.  Reports he has not slept for approximately 3 nights.  At times, patient is coherent but then intermittently confused. Yolette Mcdonald has been doing things out of the ordinary such as calling his children early in the morning or late at night.  Also calling his siblings multiple times.  Per wife, patient is Islam but usually does not speak about God to others. Jose A Cool my encounter, patient continuously spoke about the [facility] in his past.  Patient alert to name, place, situation but not year.  Denies chest pain, shortness of breath, cough, fevers, chills, sweats, urinary frequency, dysuria, abdominal pain.  Endorses hematuria 
  
Interval history / Subjective: More confused today, having some dizziness with standing. Ongoing nose bleed. Plan for DC home in the next few days on lifelong meropenem Assessment & Plan:  
 
Acute encephalopathy, 
-likely metabolic encephalopathy from infection, 
-Would avoid benzo, benadryl, and narcotics -Frequent reorientation Pseudomonas Bacteremia -MDR? 
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 
-Blood cultures continue to be positive 
-ID following -Antibiotic changed from Zosyn to meropenem 7/23, plan for lifelong therapy -PICC ordered for today Chronic systolic heart failure EF 15%, s/p LVAD. -Advanced heart failure cardiology team on board -ECHO done EF <15% Acute renal failure 
-creatinine 1.88 on admission  
-Trending down and stable creatinine Paroxysmal atrial fibrillation  
-on digoxin 
-Monitor Epistaxis - recurrent 
- ENT consult today History of hematuria and anemia 
-Coumadin discontinued on previous hospitalization due to same 
-Urine cytology suspicious for high-grade urothelial carcinoma 
-Status post cystoscopy with finding of bladder tumor on the posterior wall, status post resection 
-To follow-up with urology as outpatient Anemia of chronic disease and Iron deficiency  
-Status post transfusion of 1 unit PRBCs - Monitor labs - Will transfuse if hgb <7.0  
- Received iron infusion - Nephrology following Hyponatremia - Improved - Monitor labs Seizure Disorder - Continue Keppra BPH 
-Continue Finasteride and Tamsulosin Depression 
-Continue Effexor- XR Moderate protein calorie malnutrition 
-Consult nutrition Code status: DNR  
DVT prophylaxis: SCDs Care Plan discussed with: Patient/Family and Nurse Anticipated Disposition: Home w/Family Anticipated Discharge: 24 hours to 48 hours Hospitalist will now sign off, AHF will be following as primary service. Hospital Problems  Date Reviewed: 7/23/2020 Codes Class Noted POA Acute encephalopathy ICD-10-CM: G93.40 ICD-9-CM: 348.30  7/17/2020 Unknown Review of Systems: A comprehensive review of systems was negative except for that written in the HPI. Vital Signs:  
 Last 24hrs VS reviewed since prior progress note. Most recent are: 
Visit Vitals /90 (BP 1 Location: Right arm, BP Patient Position: At rest) Pulse 94 Temp 98.7 °F (37.1 °C) Resp 18 Ht 6' 2\" (1.88 m) Wt 76.6 kg (168 lb 14 oz) SpO2 100% BMI 21.68 kg/m² Intake/Output Summary (Last 24 hours) at 7/30/2020 1606 Last data filed at 7/30/2020 1430 Gross per 24 hour Intake 655 ml Output 1605 ml Net -950 ml Physical Examination:  
 
 
     
Constitutional:  No acute distress, cooperative, pleasant, answers questions appropriately, appears stated age ENT:  Oral mucosa moist, oropharynx benign. Resp:  CTA bilaterally. No wheezing/rhonchi/rales. No accessory muscle use CV:  Regular irregular rhythm, normal rate, LVAD in place - Drive line noted to left lower abdomen GI:  Soft, non distended, non tender. , unable to auscultate bowel sounds due to LVAD. Musculoskeletal:  No edema, warm, 2+ pulses throughout Neurologic:  Moves all extremities with generalized weakness. AAOx2-3, CN II-XII reviewed Psych:  Depressive mood Skin:  Good turgor, no rashes or ulcers Data Review:  
 Review and/or order of clinical lab test 
Review and/or order of tests in the radiology section of CPT Review and/or order of tests in the medicine section of CPT Labs:  
 
Recent Labs  
  07/30/20 0315 07/29/20 
2500 WBC 5.8 5.7 HGB 7.4* 7.9*  
HCT 23.6* 24.6*  
 179 Recent Labs  
  07/30/20 0315 07/29/20 0314 07/28/20 
0220 * 136 135* K 4.2 4.4 4.4  105 106 CO2 22 24 22 BUN 25* 22* 24* CREA 1.34* 1.31* 1.34* GLU 94 91 94 CA 8.6 8.5 8.6 MG 2.3 2.4 2.3 Recent Labs  
  07/30/20 0315 07/29/20 
0314 07/28/20 
0220 ALT 13 14 16 AP 95 90 92 TBILI 0.5 0.4 0.5 TP 7.1 7.1 7.0 ALB 2.7* 2.7* 2.6*  
GLOB 4.4* 4.4* 4.4* No results for input(s): INR, PTP, APTT, INREXT, INREXT in the last 72 hours. No results for input(s): FE, TIBC, PSAT, FERR in the last 72 hours. Lab Results Component Value Date/Time Folate 16.5 01/16/2020 04:57 AM  
  
No results for input(s): PH, PCO2, PO2 in the last 72 hours. No results for input(s): CPK, CKNDX, TROIQ in the last 72 hours. No lab exists for component: CPKMB Lab Results Component Value Date/Time Cholesterol, total 90 10/26/2019 04:09 AM  
 HDL Cholesterol 21 10/26/2019 04:09 AM  
 LDL, calculated 56.2 10/26/2019 04:09 AM  
 Triglyceride 64 10/26/2019 04:09 AM  
 CHOL/HDL Ratio 4.3 10/26/2019 04:09 AM  
 
Lab Results Component Value Date/Time Glucose (POC) 119 (H) 07/18/2020 09:42 PM  
 Glucose (POC) 124 (H) 07/18/2020 04:45 PM  
 Glucose (POC) 116 (H) 07/18/2020 11:33 AM  
 Glucose (POC) 109 (H) 07/18/2020 06:13 AM  
 Glucose (POC) 99 01/27/2020 11:11 AM  
 
Lab Results Component Value Date/Time Color YELLOW/STRAW 07/21/2020 12:43 PM  
 Appearance CLOUDY (A) 07/21/2020 12:43 PM  
 Specific gravity 1.020 07/21/2020 12:43 PM  
 Specific gravity 1.015 10/31/2019 11:00 AM  
 pH (UA) 6.0 07/21/2020 12:43 PM  
 Protein 300 (A) 07/21/2020 12:43 PM  
 Glucose Negative 07/21/2020 12:43 PM  
 Ketone Negative 07/21/2020 12:43 PM  
 Bilirubin Negative 07/21/2020 12:43 PM  
 Urobilinogen 0.2 07/21/2020 12:43 PM  
 Nitrites Negative 07/21/2020 12:43 PM  
 Leukocyte Esterase MODERATE (A) 07/21/2020 12:43 PM  
 Epithelial cells MODERATE (A) 07/21/2020 12:43 PM  
 Bacteria Negative 07/21/2020 12:43 PM  
 WBC 10-20 07/21/2020 12:43 PM  
 RBC >100 (H) 07/21/2020 12:43 PM  
 
 
 
Medications Reviewed:  
 
Current Facility-Administered Medications Medication Dose Route Frequency  sodium chloride (OCEAN) 0.65 % nasal squeeze bottle 2 Spray  2 Spray Both Nostrils Q2H PRN  
 oxymetazoline (AFRIN) 0.05 % nasal spray 2 Spray  2 Spray Both Nostrils BID PRN  
 hydrALAZINE (APRESOLINE) tablet 10 mg  10 mg Oral TID  gentamicin (GARAMYCIN) 0.1 % cream   Topical BID Mon Wed & Fri  
 meropenem (MERREM) 500 mg in 0.9% sodium chloride (MBP/ADV) 50 mL  500 mg IntraVENous Q6H  
 hydrALAZINE (APRESOLINE) 20 mg/mL injection 10 mg  10 mg IntraVENous Q4H PRN  
  epoetin yvonne-epbx (RETACRIT) injection 10,000 Units  10,000 Units SubCUTAneous Q MON, WED & FRI  
 0.9% sodium chloride infusion 250 mL  250 mL IntraVENous PRN  
 calcium polycarbophiL (FIBERCON) tablet 625 mg  1 Tab Oral DAILY  sodium chloride (NS) flush 5-40 mL  5-40 mL IntraVENous Q8H  
 sodium chloride (NS) flush 5-40 mL  5-40 mL IntraVENous PRN  
 acetaminophen (TYLENOL) tablet 650 mg  650 mg Oral Q4H PRN  
 ondansetron (ZOFRAN) injection 4 mg  4 mg IntraVENous Q4H PRN  
 bisacodyL (DULCOLAX) tablet 5 mg  5 mg Oral DAILY PRN  
 digoxin (LANOXIN) tablet 0.125 mg  0.125 mg Oral EVERY OTHER DAY  finasteride (PROSCAR) tablet 5 mg  5 mg Oral DAILY  lactobac ac& pc-s.therm-b.anim (TIAN Q/RISAQUAD)  1 Cap Oral DAILY  levETIRAcetam (KEPPRA) tablet 250 mg  250 mg Oral BID  tamsulosin (FLOMAX) capsule 0.8 mg  0.8 mg Oral QHS  venlafaxine-SR (EFFEXOR-XR) capsule 150 mg  150 mg Oral DAILY WITH BREAKFAST  
 
______________________________________________________________________ EXPECTED LENGTH OF STAY: 4d 16h ACTUAL LENGTH OF STAY:          10 
 
            
Ursula Huynh MD

## 2020-07-30 NOTE — PROGRESS NOTES
[address] [facility] [place] [address] [facility] [facility] [address] in [place], [place] Inpatient Progress Note Patient name: Alen Lopez Patient : 1950 Patient MRN: [id number] Attending MD: Neelam Nguyen I* Date of service: 20 REASON FOR CONSULT: 
UTI in patient with LVAD PROCEDURE PERFORMED:  Cystoscopy, transurethral resection of large bladder tumor.  
POD 5 
 
24hr Events: 
Confused Low grade fever Replaced neal for PVR 380ml Neal draining- pink/erica urine NTproBNP 4695 MAPs 80-96mmHg PLAN: 
LVAD device high speed at 6000rpm and low speed at 5600rpm 
Cannot tolerate beta-blockers d/t severe RV failure Cannot tolerate ACE/ARB/ARNi due to persistent orthostasis despite increased fluid intake  
Cannot tolerate spironolactone due to IVVD and hyponatremia Continue digoxin 0.125 every other day, level 0.7; trend levels daily Monitor daily CardioMEMs readings, 16mmHg (20) Albumin 12.5% IV    
hydralazine 10mg po TID, keep MAPs 70-90mmHg LVEDD > 7cm; consider RHC to determine device speed Unable to tolerate anticoagulation due to hematuria; stable LDH off aspirin UA (20) large blood, RBC >100, mod leukocytes Urine cytology; pathology revealing: Papillary urothelial carcinoma, high-grade, with lamina propria invasion, No muscularis propria present for evaluation Diagnostic cystoscopy and TURBT (20) -  resection of large papillary and solid bladder tumor on posterior wall Per urology may require repeat resection in 3-4 weeks based on path report Nephrology consult appreciated 
bladder and renal US (20) No evidence for hydronephrosis or mass lesion, small left kidney Urology consult appreciated Manual bladder irrigation prn, gross hematuria w/ clots D/C IV zosyn (20) and changed to IV merrem per ID  
ID consult appreciated, assistance on ABX regimen Repeat Blood cultures Blood cultures (20) NGTD Blood cultures (7/26/20) 1/4 bottles +GNRs Blood cultures (7/24/20) Pseudomonas in 2/4 bottles - susceptibilities available Wound culture (7/20/20) light staph aureus Gentamicin to Drive line exit site with daily dressing changes PICC placement for long term IV ABX Elevated sed rate- 109 (7/22/20) Continue soren Q po BID Fiber con 625 po daily Nutrition consult, appreciate recs Continue keppra,  Level 15.5 (7/18/20) D/C IV benadryl due to disorientation/confusion Klonopin 0.25mg po x 1 for restlessness ABG 7.56 CO2 20.7 pO2 109  HCO3 18.7 sO2 99% Hospitalist assistance appreciated DNR/DDNR Daily weights, regular cardiac diet, strict I/O Trend CBC, CMP, NTproBNP, Mag, LD, procalcitonin, digoxin Check lactic acid Use saline spray prn and Afrin spray with gauze for epistaxis, encourage to keep hands away from nares Dispo: Remain in [facility], anticipate D/C home with home health and PT with long term antibiotics 
  IMPRESSION: 
Bladder Carcinoma Gross Hematuria UTI Metabolic encephalopathy Chronic systolic heart failure Stage D, NYHA class II symptoms Coronary artery disease Ischemic cardiomyopathy, LVEF 15% S/p HM3 LVAD as destination therapy (11/18/19 by Dr. Nilam Oneal) S/p Impella 5.0 as bridge to LVAD 
HTN, goal MAP 70-90mmHg H/o VT s/p [facility] BIV-ICD PAF 
H/o recurrent UTI, pseudomonal infection Unable to tolerate AC due to hematuria MSSA drive line infection COPD 
JOSE Essential tremor on keppra Depression on effexor Persistently elevated CEA PET scan increased RML activity, not malignant per hemonc Orthostatic hypotension DNR Up-to-date with PNA vaccine - per PCP/wife 
2/4bottles blood cultures +pseudomonas (7/24/20) diarrhea Epistaxis Confusion Patient seen and examined. Data and note reviewed. I have discussed and agree with the plans as noted. 71year old male with a history of ICM s/p LVAD as DT who was admitted with Pseudomonas bacteremia.  His bacteremia has not cleared despite IV antibiotics. This am, he was confused and restless with low grade fever. He did not receive any medications overnight. Will hold on discharge and discuss options with his wife including inpatient hospice. Thank you for allowing us to participate in your patient's care. Mounika Francois MD, Trudy Rehman Chief of Cardiology, [facility] Medical Director Calos Rueda [address], [address] Office [phone] Fax [phone] CARDIAC IMAGING: 
TTE 4/20 shows large LVIDd, 6.84 cm, RVIDd 3.06 cm, TAPSE 1.15 cm, severely elevated CVP 
TTE 7/6/20- Mild AI, LViDd 6.91cm, RVIDd 5.16cm, TAPSE 1.39cm Echo (7/18/20) · Contrast used: DEFINITY. · Image quality for this study was technically difficult. · Severely dilated left ventricle. Wall thickness appears thin. Severe systolic function. Estimated left ventricular ejection fraction is <15%. Visually measured ejection fraction. · Moderately dilated left atrium. · Moderately dilated right ventricle. Moderately to severely reduced systolic function. · Dilated right atrium. · Moderate mitral valve prolapse. 
  
 
LVAD INTERROGATION: 
Device interrogated in person, Increased Speed to 6000rpm, low speed to 5600rpm 
Device function normal, normal flow, multiple PI events LVAD Pump Speed (RPM): 6000 Pump Flow (LPM): 5 MAP: 100 PI (Pulsitility Index): 3.8 Power: 4.8  Test: No 
Back Up  at Bedside & Labeled: Yes Power Module Test: No 
Driveline Site Care: No 
Driveline Dressing: Clean, Dry, and Intact Outpatient: No 
MAP in Therapeutic Range (Outpatient): Yes Testing Alarms Reviewed: Yes 
Back up SC speed: 600 Back up Low Speed Limit: 5600 Emergency Equipment with Patient?: Yes Emergency procedures reviewed?: Yes Drive line site inspected?: Yes Drive line intergrity inspected?: Yes 
 Drive line dressing changed?: Yes PHYSICAL EXAM: 
Visit Vitals /90 (BP 1 Location: Right arm, BP Patient Position: At rest) Pulse 94 Temp 99.1 °F (37.3 °C) Resp 18 Ht 6' 2\" (1.88 m) Wt 168 lb 14 oz (76.6 kg) SpO2 95% BMI 21.68 kg/m² Review of Systems Constitutional: Positive for fever. Negative for chills, malaise/fatigue and weight loss. Low grade temp HENT: Positive for hearing loss. Eyes: Negative. Respiratory: Positive for shortness of breath. Cardiovascular: Negative for chest pain, palpitations, orthopnea and leg swelling. Gastrointestinal: Negative. Genitourinary: Positive for hematuria. Negative for dysuria, flank pain and urgency. Musculoskeletal: Negative. Skin: Negative. Neurological: Positive for weakness. Endo/Heme/Allergies: Bruises/bleeds easily. Psychiatric/Behavioral: Positive for depression. Physical Exam 
Vitals signs and nursing note reviewed. Constitutional:   
   General: He is not in acute distress. Appearance: He is ill-appearing. Comments: Sitting up in chair eating HENT:  
   Head: Normocephalic. Nose:  
   Right Nostril: Epistaxis present. Comments: Epistaxis 2/2 picking nose Mouth/Throat:  
   Mouth: Mucous membranes are moist.  
   Dentition: Has dentures. Neck:  
   Vascular: No JVD. Cardiovascular:  
   Rate and Rhythm: Normal rate and regular rhythm. Occasional extrasystoles are present. Heart sounds: Murmur present. Comments: +lvad hum Pulmonary:  
   Effort: Pulmonary effort is normal.  
   Breath sounds: Normal breath sounds. Abdominal:  
   General: Bowel sounds are normal.  
   Palpations: Abdomen is soft. Genitourinary: 
   Comments: Lizama catheter draining clear yellow Musculoskeletal:  
   Right lower leg: Edema present. Left lower leg: Edema present. Skin: 
   General: Skin is warm and dry. Capillary Refill: Capillary refill takes 2 to 3 seconds. Findings: Bruising present. Comments: Scattered bruising on extremities Neurological:  
   Mental Status: He is alert. He is disoriented. Motor: Weakness present. Comments: Restless, moaning Psychiatric:     
   Attention and Perception: He is inattentive. Speech: Speech normal.  
 
 
 
 
 
PAST MEDICAL HISTORY: 
Past Medical History:  
Diagnosis Date  BPH (benign prostatic hyperplasia)  CHF (congestive heart failure) ([address]  Degenerative disc disease, lumbar  Heart failure ([address]  High cholesterol  Hypertension  Ischemic cardiomyopathy  [facility]AD (left ventricular assist device) [facility]  Paroxysmal atrial fibrillation ([facility] 4/2/2019  Spinal stenosis PAST SURGICAL HISTORY: 
Past Surgical History:  
Procedure Laterality Date  COLONOSCOPY Left 11/11/2019 [facility] at [facility]  HX CORONARY ARTERY BYPASS GRAFT    
 triple  HX HEART ASSIST DEVICE Left 10/29/2019 Impella 5.0  
 HX HEART ASSIST DEVICE Left 11/18/2019 HeartMate 3  
 HX HERNIA REPAIR    
 HX IMPLANTABLE CARDIOVERTER DEFIBRILLATOR  HX THROMBECTOMY Left 11/25/2019 Left brachial thrombectomy  FL CARDIOVERSION ELECTIVE ARRHYTHMIA EXTERNAL N/A 6/10/2019 EP CARDIOVERSION performed by Josue [place]unez [place] at [place] [address] CATH LAB  FL CARDIOVERSION ELECTIVE ARRHYTHMIA EXTERNAL N/A 6/18/2019 EP CARDIOVERSION performed by Emani Munoz MD at [facility] [place] [address], [address] CATH LAB  FL INSJ/RPLCMT PERM DFB W/TRNSVNS LDS 1/DUAL CHMBR N/A 6/21/2019 INSERT ICD BIV MULTI performed by Judith Dubois [facility] [address] [address] CATH LAB  FL TCAT IMPL WRLS P-ART PRS SNR L-T HEMODYN MNTR N/A 9/18/2019 IMPLANT HEART FAILURE MONITORING DEVICE performed by Keon Franklin MD [facility] [address] [address] CATH LAB FAMILY HISTORY: 
Family History Problem Relation Age of Onset  Lung Disease Mother  Hypertension Mother [facility] Arthritis-osteo Mother  Heart Disease Mother  Heart Disease Father  Diabetes Father SOCIAL HISTORY: 
Social History Socioeconomic History  Marital status:  Spouse name: Not on file  Number of children: Not on file  Years of education: Not on file  Highest education level: Not on file Tobacco Use  Smoking status: Former Smoker Last attempt to quit: 2010 Years since quittin.6  Smokeless tobacco: Never Used Substance and Sexual Activity  Alcohol use: Not Currently Comment: rarely  Drug use: Never Other Topics Concern LABORATORY RESULTS: 
  
Labs Latest Ref Rng & Units 2020 WBC 4.1 - 11.1 K/uL 5.8 5.7 5.9 7.3 - 6.1 7.4  
RBC 4.10 - 5.70 M/uL 2.49(L) 2.57(L) 2.54(L) 2.60(L) - 2.47(L) 2.47(L) Hemoglobin 12.1 - 17.0 g/dL 7. 4(L) 7. 9(L) 7. 5(L) 7. 7(L) 7. 6(L) 7. 4(L) 7. 3(L) Hematocrit 36.6 - 50.3 % 23. 6(L) 24. 6(L) 24. 6(L) 25. 0(L) - 24. 2(L) 24. 3(L) MCV 80.0 - 99.0 FL 94.8 95.7 96.9 96.2 - 98.0 98.4 Platelets 560 - 431 K/uL 157 179 169 170 - 180 202 Lymphocytes 12 - 49 % - - - - - - 11(L) Monocytes 5 - 13 % - - - - - - 6 Eosinophils 0 - 7 % - - - - - - 2 Basophils 0 - 1 % - - - - - - 1 Albumin 3.5 - 5.0 g/dL 2. 7(L) 2. 7(L) 2. 6(L) 2. 8(L) - 2. 7(L) 2. 8(L) Calcium 8.5 - 10.1 MG/DL 8.6 8.5 8.6 8.6 - 8.7 8.6 Glucose 65 - 100 mg/dL 94 91 94 100 - 96 95 BUN 6 - 20 MG/DL 25(H) 22(H) 24(H) 21(H) - 23(H) 30(H) Creatinine 0.70 - 1.30 MG/DL 1.34(H) 1.31(H) 1.34(H) 1.30 - 1.27 1.62(H) Sodium 136 - 145 mmol/L 134(L) 136 135(L) 136 - 137 137 Potassium 3.5 - 5.1 mmol/L 4.2 4.4 4.4 4.1 - 3.9 3.9 TSH 0.36 - 3.74 uIU/mL - - - - - - -  
PSA 0.01 - 4.0 ng/mL - - - - - - -  
LDH 85 - 241 U/L 165 191 184 167 - 166 204 Some recent data might be hidden Lab Results Component Value Date/Time  TSH 1.13 2020 11:28 AM  
 TSH 1.70 04/21/2020 01:59 PM  
 TSH 2.30 12/03/2019 03:29 AM  
 TSH 2.40 10/25/2019 07:39 PM  
 TSH 2.45 06/01/2019 04:16 AM  
 
 
ALLERGY: 
Allergies Allergen Reactions  Cefepime Other (comments)  
  myoclonus CURRENT MEDICATIONS: 
 
Current Facility-Administered Medications:  
  sodium chloride (OCEAN) 0.65 % nasal squeeze bottle 2 Spray, 2 Spray, Both Nostrils, Q2H PRN, Josep Khan NP, 2 Spray at 07/30/20 0003   oxymetazoline (AFRIN) 0.05 % nasal spray 2 Spray, 2 Spray, Both Nostrils, BID PRN, Josep Khan NP, 2 Spray at 07/30/20 0003   hydrALAZINE (APRESOLINE) tablet 10 mg, 10 mg, Oral, TID, Josep Khan NP, Stopped at 07/30/20 0901 
  gentamicin (GARAMYCIN) 0.1 % cream, , Topical, BID Mon Wed & Fri, Josep Khan NP 
  meropenem (MERREM) 500 mg in 0.9% sodium chloride (MBP/ADV) 50 mL, 500 mg, IntraVENous, Q6H, Geoff Godfrey MD, Last Rate: 100 mL/hr at 07/30/20 0903, 500 mg at 07/30/20 9057   hydrALAZINE (APRESOLINE) 20 mg/mL injection 10 mg, 10 mg, IntraVENous, Q4H PRN, Josep Khan NP, 10 mg at 07/27/20 2036   epoetin yvonne-epbx (RETACRIT) injection 10,000 Units, 10,000 Units, SubCUTAneous, Q MON, WED & FRI, Long Porter MD, 10,000 Units at 07/29/20 2112 
  0.9% sodium chloride infusion 250 mL, 250 mL, IntraVENous, PRN, Shana Sims NP 
  calcium polycarbophiL (FIBERCON) tablet 625 mg, 1 Tab, Oral, DAILY, Josep Khan NP, 625 mg at 07/30/20 9609   sodium chloride (NS) flush 5-40 mL, 5-40 mL, IntraVENous, Q8H, Madhuri Zhao DO, 10 mL at 07/30/20 4666   sodium chloride (NS) flush 5-40 mL, 5-40 mL, IntraVENous, PRN, Richy FRENCH,  
  acetaminophen (TYLENOL) tablet 650 mg, 650 mg, Oral, Q4H PRN, Richy FRENCH DO, 650 mg at 07/27/20 4806   ondansetron (ZOFRAN) injection 4 mg, 4 mg, IntraVENous, Q4H PRN, Richy FRENCH DO, 4 mg at 07/22/20 6809   bisacodyL (DULCOLAX) tablet 5 mg, 5 mg, Oral, DAILY PRN, Shin Galan, [employer] M, DO 
  digoxin (LANOXIN) tablet 0.125 mg, 0.125 mg, Oral, EVERY OTHER DAY, Hyun Zhao M, DO, 0.125 mg at 07/30/20 1026 
  finasteride (PROSCAR) tablet 5 mg, 5 mg, Oral, DAILY, Zhao, [employer] M, DO, 5 mg at 07/30/20 1026 
  [facility] ac& pc-s.therm-b.anim (TIAN Q/RISAQUAD), 1 Cap, Oral, DAILY, Zhao, Jay Jay, DO, 1 Cap at 07/30/20 7805   levETIRAcetam (KEPPRA) tablet 250 mg, 250 mg, Oral, BID, Alphia  M, DO, 250 mg at 07/30/20 0901   tamsulosin (FLOMAX) capsule 0.8 mg, 0.8 mg, Oral, QHS, Hyun Zhao M, DO, 0.8 mg at 07/29/20 2112 
  venlafaxine-SR (EFFEXOR-XR) capsule 150 mg, 150 mg, Oral, DAILY WITH BREAKFAST, Hyun Zhao M, DO, 150 mg at 07/30/20 0901 Thank you for allowing me to participate in this patient's care. TIERRA Luke [address]354 [address], [address] Phone: [phone] Fax: [phone]

## 2020-07-30 NOTE — PROGRESS NOTES
Occupational Therapy Patient chart reviewed in prep for OT session. Spoke with RN, who requests holding therapy this date 2/2 ongoing discomfort, anxiety, and persistent nosebleed. Per RN request, will follow-up tomorrow as able and appropriate. Thank you.  
Lorne Leon M.S. OTR/L

## 2020-07-30 NOTE — PROGRESS NOTES
Problem: Falls - Risk of 
Goal: *Absence of Falls Description: Document Leonel Matos Fall Risk and appropriate interventions in the flowsheet. Outcome: Progressing Towards Goal 
Note: Fall Risk Interventions: 
Mobility Interventions: Communicate number of staff needed for ambulation/transfer, Patient to call before getting OOB, Strengthening exercises (ROM-active/passive), Utilize gait belt for transfers/ambulation, PT Consult for mobility concerns, OT consult for ADLs, Bed/chair exit alarm Mentation Interventions: Adequate sleep, hydration, pain control, Increase mobility, More frequent rounding, Reorient patient, Room close to nurse's station, Toileting rounds, Update white board, Door open when patient unattended, Bed/chair exit alarm Medication Interventions: Evaluate medications/consider consulting pharmacy, Teach patient to arise slowly, Patient to call before getting OOB, Utilize gait belt for transfers/ambulation Elimination Interventions: Call light in reach, Patient to call for help with toileting needs, Stay With Me (per policy), Toilet paper/wipes in reach, Toileting schedule/hourly rounds Problem: Patient Education: Go to Patient Education Activity Goal: Patient/Family Education Outcome: Progressing Towards Goal 
  
Problem: LVAD: Discharge Outcomes Goal: *Hemodynamically stable Outcome: Progressing Towards Goal 
Goal: *Lungs clear or at baseline Outcome: Progressing Towards Goal 
Goal: *Tolerating diet Outcome: Progressing Towards Goal 
Goal: *LVAD parameters within set limits Outcome: Progressing Towards Goal 
Goal: *LVAD drive line site without signs and symptoms of wound complications Outcome: Progressing Towards Goal 
  
Problem: Pressure Injury - Risk of 
Goal: *Prevention of pressure injury Description: Document Mich Scale and appropriate interventions in the flowsheet. Outcome: Progressing Towards Goal 
Note: Pressure Injury Interventions: Sensory Interventions: Assess changes in LOC, Assess need for specialty bed, Chair cushion, Float heels, Keep linens dry and wrinkle-free, Maintain/enhance activity level, Minimize linen layers, Pressure redistribution bed/mattress (bed type), Turn and reposition approx. every two hours (pillows and wedges if needed) Moisture Interventions: Absorbent underpads, Internal/External urinary devices, Check for incontinence Q2 hours and as needed, Minimize layers, Offer toileting Q_hr Activity Interventions: Chair cushion, Increase time out of bed, Pressure redistribution bed/mattress(bed type) Mobility Interventions: Chair cushion, HOB 30 degrees or less, Pressure redistribution bed/mattress (bed type) Nutrition Interventions: Document food/fluid/supplement intake Friction and Shear Interventions: Lift sheet, Minimize layers Problem: Patient Education: Go to Patient Education Activity Goal: Patient/Family Education Outcome: Progressing Towards Goal

## 2020-07-30 NOTE — PROGRESS NOTES
[facility] VAD/Heart Failure Progress Note Admit Date: 2020 POD:  7 Days Post-Op Procedure:  Procedure(s): 
CYSTO RETROGRADE PYELOGRAM/  BLADDER BIOPSY/ TURBT Subjective:  
Mild fatigue and weakness; confusion Objective:  
Vitals: 
Blood pressure 110/90, pulse 94, temperature 98.7 °F (37.1 °C), resp. rate 18, height 6' 2\" (1.88 m), weight 168 lb 14 oz (76.6 kg), SpO2 100 %. Temp (24hrs), Av.6 °F (37 °C), Min:98 °F (36.7 °C), Max:99.1 °F (37.3 °C) Hemodynamics: 
 CO:   
 CI:   
 CVP:   
 SVR:   
 PAP Systolic:   
 PAP Diastolic:   
 PVR:   
 XJ23:   
 SCV02:   
 
VAD Interrogation: LVAD (Heartmate) Pump Speed (RPM): 6000 Pump Flow (LPM): 4.9 Chatter in Lines: No 
PI (Pulsitility Index): 3.8 Power: 4.7 MAP: 94  Test: No 
Back Up  at Bedside & Labeled: Yes Power Module Test: No 
Driveline Site Care: No 
Driveline Dressing: Clean, Dry, and Intact EKG/Rhythm:   
 
Extubation Date / Time:  
 
CT Output:  
 
Ventilator: 
  
 
Oxygen Therapy: 
Oxygen Therapy O2 Sat (%): 100 % (20 1507) Pulse via Oximetry: 80 beats per minute (20 0520) O2 Device: Room air (20 1153) O2 Flow Rate (L/min): 2 l/min (20 2352) CXR: 
 
Admission Weight: Last Weight Weight: 175 lb 0.7 oz (79.4 kg) Weight: 168 lb 14 oz (76.6 kg) Intake / Output / Drain: 
Current Shift:  07 -  1900 In: 26 [P.O.:60; I.V.:400] Out: 740 [Urine:740] Last 24 hrs.:  
 
Intake/Output Summary (Last 24 hours) at 2020 1526 Last data filed at 2020 1430 Gross per 24 hour Intake 655 ml Output 1605 ml Net -950 ml No results for input(s): CPK, CKMB, TROIQ in the last 72 hours. Recent Labs  
  20 
03120 
03120 
0220 * 136 135* K 4.2 4.4 4.4  
CO2 22 24 22 BUN 25* 22* 24* CREA 1.34* 1.31* 1.34* GLU 94 91 94 MG 2.3 2.4 2.3 WBC 5.8 5.7 5.9 HGB 7.4* 7.9* 7.5*  
 HCT 23.6* 24.6* 24.6*  
 179 169 No results for input(s): INR, PTP, APTT, INREXT in the last 72 hours. No lab exists for component: PBNP Current Facility-Administered Medications:  
  sodium chloride (OCEAN) 0.65 % nasal squeeze bottle 2 Spray, 2 Spray, Both Nostrils, Q2H PRN, Adrian Segundo, NP, 2 Spray at 07/30/20 0003   oxymetazoline (AFRIN) 0.05 % nasal spray 2 Spray, 2 Spray, Both Nostrils, BID PRN, Adrian Segundo, NP, 2 Spray at 07/30/20 0003   hydrALAZINE (APRESOLINE) tablet 10 mg, 10 mg, Oral, TID, Adrian Raymond NP, 10 mg at 07/30/20 1519 
  gentamicin (GARAMYCIN) 0.1 % cream, , Topical, BID Mon Wed & Fri, Adrian Raymond NP 
  meropenem (MERREM) 500 mg in 0.9% sodium chloride (MBP/ADV) 50 mL, 500 mg, IntraVENous, Q6H, Geoff Godfrey MD, Last Rate: 100 mL/hr at 07/30/20 1519, 500 mg at 07/30/20 1519   hydrALAZINE (APRESOLINE) 20 mg/mL injection 10 mg, 10 mg, IntraVENous, Q4H PRN, Adrian Raymond NP, 10 mg at 07/27/20 2036   epoetin yvonne-epbx (RETACRIT) injection 10,000 Units, 10,000 Units, SubCUTAneous, Q MON, WED & FRI, Kacey Corona MD, 10,000 Units at 07/29/20 2112 
  0.9% sodium chloride infusion 250 mL, 250 mL, IntraVENous, PRN, Levi, Shana B, NP 
  calcium polycarbophiL (FIBERCON) tablet 625 mg, 1 Tab, Oral, DAILY, Adrian Raymond NP, 625 mg at 07/30/20 8514   sodium chloride (NS) flush 5-40 mL, 5-40 mL, IntraVENous, Q8H, Madhuri Zhao DO, 10 mL at 07/30/20 1519 
  sodium chloride (NS) flush 5-40 mL, 5-40 mL, IntraVENous, PRN, Desma Silence, [employer] M, DO 
  acetaminophen (TYLENOL) tablet 650 mg, 650 mg, Oral, Q4H PRN, Segundo Mage M, DO, 650 mg at 07/27/20 2322   ondansetron (ZOFRAN) injection 4 mg, 4 mg, IntraVENous, Q4H PRN, Segundo Mage M, DO, 4 mg at 07/22/20 2305   bisacodyL (DULCOLAX) tablet 5 mg, 5 mg, Oral, DAILY PRN, Margaux Egan, Madhuri, DO 
   digoxin (LANOXIN) tablet 0.125 mg, 0.125 mg, Oral, EVERY OTHER DAY, Cisco Castañeda M, DO, 0.125 mg at 07/30/20 1026 
  finasteride (PROSCAR) tablet 5 mg, 5 mg, Oral, DAILY, CARLOS Zhao M, DO, 5 mg at 07/30/20 1026 
  lactobac ac& pc-s.therm-b.anim (TIAN Q/RISAQUAD), 1 Cap, Oral, DAILY, Jay Jay Zhao, DO, 1 Cap at 07/30/20 7942   levETIRAcetam (KEPPRA) tablet 250 mg, 250 mg, Oral, BID, Cisco Castañeda M, DO, 250 mg at 07/30/20 0901   tamsulosin (FLOMAX) capsule 0.8 mg, 0.8 mg, Oral, QHS, Hyun Zhao, DO, 0.8 mg at 07/29/20 2112 
  venlafaxine-SR (EFFEXOR-XR) capsule 150 mg, 150 mg, Oral, DAILY WITH BREAKFAST, Hyun Zhao DO, 150 mg at 07/30/20 0901 A/P 
S/P LVAD - good flows Need for anticoagulation - on hodl due to recurrent bleeds UTI - abx's Confusion - monitor 
  
Risk of morbidity and mortality - high Medical decision making - high complexity Signed By: Jaclyn Woods MD

## 2020-07-30 NOTE — PROGRESS NOTES
Physical Therapy 7/30/2020 Chart reviewed. Patient in chair, moaning, spitting often due to epistaxis and post nasal drip. Restless and RN reported patient orthostatic this morning. Night shift RN documented little to no sleep for patient overnight. Hold PT and f/u later today as able/appropriate for PT. Thank you.  
 
Claire Bryson, PT, DPT

## 2020-07-30 NOTE — PROGRESS NOTES
ID Progress Note 2020 Subjective:  
 
Doing better overall, still fatigues easily--somewhat depressed, no physical complaints, however. Cystoscopy with removal of lesion--high-grade malignancy, apparently Objective: Antibiotics: 1. Zosyn 2. Karon Sear Vitals:  
Visit Vitals /90 (BP 1 Location: Right arm, BP Patient Position: At rest) Pulse 94 Temp 98.7 °F (37.1 °C) Resp 18 Ht 6' 2\" (1.88 m) Wt 76.6 kg (168 lb 14 oz) SpO2 100% BMI 21.68 kg/m² Tmax:  Temp (24hrs), Av.6 °F (37 °C), Min:98 °F (36.7 °C), Max:99.1 °F (37.3 °C) Exam:  Lungs:  clear to auscultation bilaterally Heart:  regular rate and rhythm Abdomen:  soft, non-tender. Bowel sounds normal. No masses,  no organomegaly Labs:     
Recent Labs  
  20 
0315 20 
9986 20 
0220 WBC 5.8 5.7 5.9 HGB 7.4* 7.9* 7.5*  179 169 BUN 25* 22* 24* CREA 1.34* 1.31* 1.34* AP 95 90 92 TBILI 0.5 0.4 0.5 Cultures: No results found for: SDES Lab Results Component Value Date/Time Culture result: NO GROWTH 2 DAYS 2020 01:43 PM  
 Culture result: (A) 2020 01:23 PM  
  PSEUDOMONAS AERUGINOSA GROWING IN 1 OF 4 BOTTLES DRAWN (SITE = [facility]) Culture result: (A) 2020 01:23 PM  
  PRELIMINARY REPORT OF GRAM NEGATIVE RODS GROWING IN 1 OF 4 BOTTLES DRAWN CALLED TO AND READ BACK BY MS ROSALIND WILKES AT 2000 ON 20. PJ  
 Culture result: REMAINING BOTTLE(S) HAS/HAVE NO GROWTH SO FAR 2020 01:23 PM  
 
 
Radiology:  
 
Line/Insert Date:        
 
Assessment: 1. Drive line infection 2. Recurrent Pseudomonas bacteremia--persistent--last cultures negative to date 3. Organism resistant to oral antibiotic options 4. Options include--lifelong antibiotic therapy, move LVAD or Hospice Objective: 1. Continue Merrem in face of ongoing bacteremia 2. Case management looking into coverage options Jude Ball MD

## 2020-07-30 NOTE — PROGRESS NOTES
0745 Bedside and Verbal shift change report given to Domenica Sierra (oncoming nurse) by Davis Jean RN (offgoing nurse). Report included the following information SBAR, Kardex, ED Summary, Intake/Output, MAR, Recent Results, Med Rec Status and Cardiac Rhythm Paced. 0800 Incontinent of bladder in recliner. Post void Bladder scan: > 380 mL.  
 
0930 Perfect Serve Dr. Last Dejesus. Pt has been moaning, restless but denies any pain. [address] Dr. Last Dejesus at bedside and VORB to give 12.5 g of 5% albumin. 1225 Paired blood cultures drawn and delivered to lab. 905 [facility] Jitendra Rojas MD, [address] for ENT consult regarding epistaxis. 1200 [facility], NP VORB for nonviolent mitt restraints to prevent removal of epistaxis dressing. 1800 Palliative meeting scheduled for tomorrow, 7/31 @ 1030. Updated Scotty Finch, wife. 1830 Pt coughed 4 blood clots larger than the size of a quarter each. Nose not actively bleeding. 1945 Bedside and Verbal shift change report given to Urmila Quiñones RN (oncoming nurse) by Domenica Sierra (offgoing nurse). Report included the following information SBAR, Kardex, ED Summary, Intake/Output, MAR, Recent Results, Med Rec Status and Cardiac Rhythm Paced.

## 2020-07-31 NOTE — PROGRESS NOTES
Pt's wife, daughter and son at bedside at time of visit. Pt acknowledged visit, however the family was having a conversation and advised that a later time of visit would be better. Encouraged them to have a  paged when a visit would be more appropriate. Chaplains will continue to offer support as needed. Chaplain Carlos, MDiv, MS, [facility] 
[phone]

## 2020-07-31 NOTE — CONSULTS
Palliative Medicine Consult Abhilash: [phone] ([phone]) Patient Name: Rupesh Cunningham YOB: 1950 Date of Initial Consult: 7/31/2020 Reason for Consult: Care decisions Requesting Provider: Advanced heart failure team 
Primary Care Physician: Farhad Mack MD 
 
 SUMMARY:  
Rupesh Cunningham is a 71 y.o. 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 Chart reviewed patient is a 77-year-old male with very complicated past medical history. Patient underwent LVAD in November 2019. He had a lengthy hospitalization. Unfortunately, admitted to the hospital with current Pseudomonas bacteremia which appears to be driveline infection. Recommendation from ID is long-term antibiotics via IV route. In addition, patient with hematuria and underwent cystoscopy with biopsy that shows bladder cancer. Patient with increasing confusion over the last couple days. Team has been asked to see him for goals of care as patient's wife is concerned he may be suffering and wanted to make sure she knew all options. In reviewing the chart, patient did receive Benadryl Monday night for sleep which caused him to sleep throughout the day on Tuesday. Digoxin level is normal.  Symptoms seem to be more restlessness with some confusion. In talking with his wife, there may have actually been some of this confusion at home even prior to admission. He had been more fatigued and certainly not as active. COVID pandemic has not helped the ability for him to get out. Social history patient with 2 children at the bedside. Been  to [place]. 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   Loves to be outside cutting grass. PALLIATIVE DIAGNOSES:  
1. Goals of care 2. Altered mental status/restless difficult to know for sure exact cause 3. Advanced heart failure with LVAD 4. Recent diagnosis of bladder cancer 5. Debility PLAN:  
1. Sanchez Hilliard  with hospice, , and I met with patient, spouse, 2 children. Reviewed the role of palliative medicine and then discussed his current medical issues. We reviewed all of the current medical problems and family is well aware/understands his medical issues. They are concerned about this restlessness. Patient almost shows evidence of extrapyramidal effects with hand movements and movements of his head. He really cannot fully participate in our discussion. Cannot remember where he was. Sometimes he would answer some simple questions such as his name. He certainly knew his family. Once again, family states a lot of this is new. They do think he is a little better today than yesterday. 2. Goals of care once again, our team is not here to tell them what to do but make sure they understand options. We discussed continuing current plan to see if he show some improvement. We will look at all of his medications to see/make sure there is nothing causing some of this confusion. In addition, we reviewed potential options going forward if he were to show further decline. We discussed the possibility of inpatient hospice versus outpatient hospice. If they were to make the decision to transition to hospice, typically we would not do long-term antibiotics and we would have a discussion about transition off of LVAD. For this reason, we likely would start with inpatient hospice care so that we could observe him very closely and for symptoms if they decide to stop his LVAD. Once again, explained to family they do not need to make any decision today.   Our team is available over the weekend if he has significant decline but otherwise we will follow-up on Monday. 3. Advance care planning he has an advanced medical directive in the chart. His wife is primary medical power of  and Nelia Eason is secondary 4. CODE STATUSthis is already been addressed. He has a durable DO NOT RESUSCITATE form in the chart. 5. Symptom managementrestlessness. Patient had 2 doses of Klonopin yesterday at 0.25 mg. Seemed to tolerate this dose. Discussed with LANA BRICENO MARTE [facility] practitioner with advanced heart failure. I think a low-dose benzo is appropriate. Could consider Xanax which is shorter acting and Klonopin and hypothetically clear his system quicker but if she thinks this is been effective with the Klonopin, I think completely reasonable to continue. Would avoid antipsychotics given cardiac issues as well as questionable extrapyramidal side effects. 6. Psychosocialpatient clearly with great support from family. He has been talking about his jessie a lot over the last couple days. Our team will continue to follow closely 7. Discussed with case management, nurse practitioner Lars Sheehan, bedside nurse 8. Initial consult note routed to primary continuity provider and/or primary health care team members 9. Communicated plan of care with: Palliative IDT, [address] Team 
 
 GOALS OF CARE / TREATMENT PREFERENCES:  
 
GOALS OF CARE: 
Patient/Health Care Proxy Stated Goals: Other (comment)(Family debating all options) TREATMENT PREFERENCES:  
Code Status: DNR Advance Care Planning: 
[x] [facility] Interdisciplinary Team has updated the ACP Navigator with [place] and Patient Capacity Primary Decision Maker: Mary Lou Sharma - Spouse - [phone] Secondary Decision Maker: Suresh Haines - [phone] Advance Care Planning 7/17/2020 Patient's Healthcare Decision Maker is: - Confirm Advance Directive None Other Instructions: Other: As far as possible, the palliative care team has discussed with patient / health care proxy about goals of care / treatment preferences for patient. HISTORY:  
 
History obtained from: Chart, case management, bedside nurse, family, patient CHIEF COMPLAINT: Confusion HPI/SUBJECTIVE: The patient is:  
[x] Verbal and participatory [] Non-participatory due to:  
Patient is comfortable. He denies any pain. Remains pleasantly confused. Able to answer some basic questions and does recognize his family. Really cannot answer other questions about his medical issues. Was not able to answer where he was he states he could not remember Clinical Pain Assessment (nonverbal scale for severity on nonverbal patients):  
Clinical Pain Assessment Severity: 0 Duration: for how long has pt been experiencing pain (e.g., 2 days, 1 month, years) Frequency: how often pain is an issue (e.g., several times per day, once every few days, constant) FUNCTIONAL ASSESSMENT:  
 
Palliative Performance Scale (PPS): PPS: 50 PSYCHOSOCIAL/SPIRITUAL SCREENING:  
 
Palliative IDT has assessed this patient for cultural preferences / practices and a referral made as appropriate to needs (Cultural Services, Patient Advocacy, Ethics, etc.) Any spiritual / Sikh concerns: 
[] Yes /  [x] No 
 
Caregiver Burnout: 
[] Yes /  [x] No /  [] No Caregiver Present Anticipatory grief assessment:  
[x] Normal  / [] Maladaptive ESAS Anxiety: Anxiety: 3 ESAS Depression: Depression: 0 REVIEW OF SYSTEMS:  
 
Positive and pertinent negative findings in ROS are noted above in HPI. The following systems were [x] reviewed / [] unable to be reviewed as noted in HPI Other findings are noted below. Systems: constitutional, ears/nose/mouth/throat, respiratory, gastrointestinal, genitourinary, musculoskeletal, integumentary, neurologic, psychiatric, endocrine. Positive findings noted below. Modified ESAS Completed by: provider Fatigue: 1 Drowsiness: 0 Depression: 0 Pain: 0 Anxiety: 3 Nausea: 0 Anorexia: 0 Dyspnea: 1 Constipation: No  
  Stool Occurrence(s): 1 PHYSICAL EXAM:  
 
From RN flowsheet: 
Wt Readings from Last 3 Encounters:  
07/31/20 166 lb 10.7 oz (75.6 kg) 07/13/20 179 lb (81.2 kg) 07/08/20 177 lb 14.6 oz (80.7 kg) Blood pressure 110/90, pulse 95, temperature 98.2 °F (36.8 °C), resp. rate 18, height 6' 2\" (1.88 m), weight 166 lb 10.7 oz (75.6 kg), SpO2 95 %. Pain Scale 1: Numeric (0 - 10) Pain Intensity 1: 0 Pain Onset 1: acute Pain Location 1: Head 
Pain Orientation 1: Anterior Pain Description 1: Aching Pain Intervention(s) 1: Medication (see MAR) Last bowel movement, if known:  
 
Constitutional: Thin, no acute distress, alert to person Eyes: pupils equal, anicteric ENMT: no nasal discharge, moist mucous membranes Cardiovascular: regular rhythm, distal pulses intact, LVAD in place Respiratory: breathing not labored, symmetric Gastrointestinal: soft non-tender, +bowel sounds Musculoskeletal: no deformity, no tenderness to palpation Skin: warm, dry Neurologic: following commands, moving all extremities Psychiatric: Slightly mildly restless Other: 
 
 
 HISTORY:  
 
Active Problems: 
  Acute encephalopathy (7/17/2020) Past Medical History:  
Diagnosis Date  BPH (benign prostatic hyperplasia)  CHF (congestive heart failure) ([address]  Degenerative disc disease, lumbar  Heart failure ([address]  High cholesterol  Hypertension  Ischemic cardiomyopathy  LVAD (left ventricular assist device) present ([address]  Paroxysmal atrial fibrillation ([address] 4/2/2019  Spinal stenosis Past Surgical History:  
Procedure Laterality Date  COLONOSCOPY Left 11/11/2019 COLONOSCOPY at bedside performed by Milagros Webb MD at [facility]  HX CORONARY ARTERY BYPASS GRAFT    
 triple  HX HEART ASSIST DEVICE Left 10/29/2019 Impella 5.0  
 HX HEART ASSIST DEVICE Left 2019 HeartMate 3  
 HX HERNIA REPAIR    
 HX IMPLANTABLE CARDIOVERTER DEFIBRILLATOR  HX THROMBECTOMY Left 2019 Left brachial thrombectomy  ID CARDIOVERSION ELECTIVE ARRHYTHMIA EXTERNAL N/A 6/10/2019 EP CARDIOVERSION performed by Marcio Nguyễn MD at [address] CATH LAB  ID CARDIOVERSION ELECTIVE ARRHYTHMIA EXTERNAL N/A 2019 EP CARD[facility]ERS[facility] [address] C[facility]  ID INSJ/RPLCMT PERM DFB W/TRNSVNS LDS 1/DUAL CHMBR N/A 2019 INSERT ICD BIV MULTI performed by Genesis Pfeiffer MD at [address] [facility] [address] CATH LAB  ID TCAT IMPL WRLS P-ART PRS SNR L-T HEMODYN MNTR N/A 2019 IMPLANT HEART FAILURE MONITORING DEVICE performed by Elías Adhikari MD at [address] [place] [address] CATH LAB Family History Problem Relation Age of Onset  Lung Disease Mother  Hypertension Mother [facility] Arthritis-osteo Mother  Heart Disease Mother  Heart Disease Father  Diabetes Father History reviewed, no pertinent family history. Social History Tobacco Use  Smoking status: Former Smoker Last attempt to quit: 2010 Years since quittin.6  Smokeless tobacco: Never Used Substance Use Topics  Alcohol use: Not Currently Comment: rarely Allergies Allergen Reactions  Cefepime Other (comments)  
  myoclonus Current Facility-Administered Medications Medication Dose Route Frequency  sodium bicarbonate tablet 650 mg  650 mg Oral BID  clonazePAM (KlonoPIN) tablet 0.25 mg  0.25 mg Oral TID PRN  
 sodium chloride (OCEAN) 0.65 % nasal squeeze bottle 2 Spray  2 Spray Both Nostrils Q2H PRN  
 oxymetazoline (AFRIN) 0.05 % nasal spray 2 Spray  2 Spray Both Nostrils BID PRN  
 hydrALAZINE (APRESOLINE) tablet 10 mg  10 mg Oral TID  gentamicin (GARAMYCIN) 0.1 % cream   Topical BID Mon Wed & Fri  
  meropenem (MERREM) 500 mg in 0.9% sodium chloride (MBP/ADV) 50 mL  500 mg IntraVENous Q6H  
 hydrALAZINE (APRESOLINE) 20 mg/mL injection 10 mg  10 mg IntraVENous Q4H PRN  
 epoetin yvonne-epbx (RETACRIT) injection 10,000 Units  10,000 Units SubCUTAneous Q MON, WED & FRI  
 0.9% sodium chloride infusion 250 mL  250 mL IntraVENous PRN  
 calcium polycarbophiL (FIBERCON) tablet 625 mg  1 Tab Oral DAILY  sodium chloride (NS) flush 5-40 mL  5-40 mL IntraVENous Q8H  
 sodium chloride (NS) flush 5-40 mL  5-40 mL IntraVENous PRN  
 acetaminophen (TYLENOL) tablet 650 mg  650 mg Oral Q4H PRN  
 ondansetron (ZOFRAN) injection 4 mg  4 mg IntraVENous Q4H PRN  
 bisacodyL (DULCOLAX) tablet 5 mg  5 mg Oral DAILY PRN  
 digoxin (LANOXIN) tablet 0.125 mg  0.125 mg Oral EVERY OTHER DAY  finasteride (PROSCAR) tablet 5 mg  5 mg Oral DAILY  lactobac ac& pc-s.therm-b.anim (TIAN Q/RISAQUAD)  1 Cap Oral DAILY  levETIRAcetam (KEPPRA) tablet 250 mg  250 mg Oral BID  tamsulosin (FLOMAX) capsule 0.8 mg  0.8 mg Oral QHS  venlafaxine-SR (EFFEXOR-XR) capsule 150 mg  150 mg Oral DAILY WITH BREAKFAST  
 
 
 
 LAB AND IMAGING FINDINGS:  
 
Lab Results Component Value Date/Time WBC 5.7 07/31/2020 04:34 AM  
 HGB 7.4 (L) 07/31/2020 04:34 AM  
 PLATELET 429 20/69/8028 04:34 AM  
 
Lab Results Component Value Date/Time Sodium 134 (L) 07/31/2020 04:34 AM  
 Potassium 4.0 07/31/2020 04:34 AM  
 Chloride 105 07/31/2020 04:34 AM  
 CO2 19 (L) 07/31/2020 04:34 AM  
 BUN 23 (H) 07/31/2020 04:34 AM  
 Creatinine 1.29 07/31/2020 04:34 AM  
 Calcium 8.9 07/31/2020 04:34 AM  
 Magnesium 2.2 07/31/2020 04:34 AM  
 Phosphorus 2.8 07/27/2020 03:03 AM  
  
Lab Results Component Value Date/Time Alk. phosphatase 102 07/31/2020 04:34 AM  
 Protein, total 7.5 07/31/2020 04:34 AM  
 Albumin 2.8 (L) 07/31/2020 04:34 AM  
 Globulin 4.7 (H) 07/31/2020 04:34 AM  
 
Lab Results Component Value Date/Time INR 1.1 07/17/2020 11:51 AM  
 Prothrombin time 11.5 (H) 07/17/2020 11:51 AM  
 aPTT 27.9 07/02/2020 06:06 PM  
  
Lab Results Component Value Date/Time Iron 84 07/21/2020 04:53 AM  
 TIBC 146 (L) 07/21/2020 04:53 AM  
 Iron % saturation 58 (H) 07/21/2020 04:53 AM  
 Ferritin 511 (H) 07/21/2020 04:53 AM  
  
No results found for: PH, PCO2, PO2 No components found for: [place] Lab Results Component Value Date/Time CK 23 (L) 12/05/2019 10:36 AM  
 CK - MB 1.8 12/05/2019 10:36 AM  
  
 
 
   
 
Total time: 70 
Counseling / coordination time, spent as noted above: 65 
> 50% counseling / coordination?: yes Prolonged service was provided for  []30 min   []75 min in face to face time in the presence of the patient, spent as noted above. Time Start:  
Time End:  
Note: this can only be billed with 55088 (initial) or 00529 (follow up). If multiple start / stop times, list each separately.

## 2020-07-31 NOTE — PROGRESS NOTES
18 - CM updated that family meeting is scheduled today for 1030AM.  Patient's wife Ewelina Tellez), son (Jasson), and daughter Terrence Pereira) have been approved to participate in person for palliative care team by RN supervisor and CVSU manager. 1100 - CM requested by Brodie Hagen to participate in family meeting with Palliative Medicine. Family meeting held between patient, wife Ewelina Tellez), son (Jasson), daughter Terrence Pereira), Palliative team Jennifer Ramirez MD and Jeny Garcia). Palliative care team explained options for hospice care - start in the hospital as LVAD is turned off and possible transition to home hospice pending how patient's condition changes. Dr. Jennifer Ramirez will look into recommendations for patient's restlessness and anxiety. We also discussed the \"unknowns\" of what is causing the patient's change in behavior - restlessness, etc and if they will improve. Recommendations after the meeting are to see how patient does over the weekend - should he decline, family to request RN to page Palliative Care. We will regroup on Monday. CM will continue to follow.  
 
Maria Luisa Cobb, MPH

## 2020-07-31 NOTE — PROGRESS NOTES
Occupational Therapy Patient chart reviewed and spoke with RN - Patient family meeting scheduled for today at 10:30 am to discuss plan of care and potential transition to palliative care. Will defer at this time and follow-up as able and appropriate pending outcome of meeting. Thank you.  
Malick Dunlap M.S. OTR/[place]FF[place]

## 2020-07-31 NOTE — PROGRESS NOTES
Music Therapy Assessment [facility] DANAY Smiley [id number] 1950  71 y.o.  male Patient Telephone Number: [phone] (home) Yazidism Affiliation: [employer]aritan Language: [place] Patient Active Problem List  
 Diagnosis Date Noted  Acute encephalopathy 07/17/2020  Anemia 07/02/2020  Hematuria 07/02/2020  Sepsis ([address] 04/20/2020  Chronic anticoagulation 02/24/2020  LVAD (left ventricular assist device) present ([address] 02/24/2020  Tremor 01/20/2020  Acute decompensated heart failure ([address] 10/25/2019  Peripheral vascular disease ([address] 09/12/2019  Systolic CHF, chronic ([address] 08/23/2019  Acute on chronic systolic CHF (congestive heart failure) ([address] 05/31/2019  Paroxysmal atrial fibrillation ([address] 04/02/2019 Date: 7/31/2020            Total Time (in minutes): 25          [address] [place] [address] CV SERVICES UNIT Mental Status:   [x] Alert [  ] Satira Dago [  ]  Confused  [  ] Minimally responsive  [  ] Sleeping Communication Status: [  ] Impaired Speech [  ] Nonverbal -N/A Physical Status:   [  ] Oxygen in use  [  ] Hard of Hearing [  ] Vision Impaired [  ] Ambulatory  [  ] Ambulatory with assistance [  ] Non-ambulatory -N/A Music Preferences, Background: Pt said he likes the [employer], and likes Country, [employer], and [employer]. Clinical Problem addressed: Support healthy coping, spiritual support. Goal(s) met in session: 
Physical/Pain management (Scale of 1-10): Pre-session rating: Pt denied pain. Post-session rating: Pt denied pain. [  ] Increased relaxation   [  ] Affected breathing patterns [  ] Decreased muscle tension   [  ] Decreased agitation [  ] Affected heart rate    [  ] Increased alertness Emotional/Psychological: 
[  ] Increased self-expression   [  ] Decreased aggressive behavior [  ] Decreased feelings of stress  [x] Discussed healthy coping skills [  ] Improved mood    [  ] Decreased withdrawn behavior Social: 
[  ] Decreased feelings of isolation/loneliness [x] Positive social interaction [  ] Provided support and/or comfort for family/friends Spiritual: 
[x] Spiritual support    [  ] Expressed peace [  ] Expressed jessie    [  ] Discussed beliefs Techniques Utilized (Check all that apply):  
[  ] Procedural support MT [  ] Music for relaxation [x] Patient preferred music 
[  ] Janet analysis  [x] Song choice  [  ] Music for validation [  ] Entrainment  [  ] Movement to music [  ] Guided visualization [  ] Kristina Pulling  [  ] Patient instrument playing [  ] Theotis Dark writing [  ] Bridger Felt along   [  ] Rusty Treviño  [  ] Sensory stimulation [  ] Active Listening  [x] Music for spiritual support [  ] Making of CDs as gifts Session Observations:  Referral from Todd Gleason, Palliative . Patient (pt) was alert lying in bed. After asking how he was feeling, this music therapist (MT) asked pt about his music preferences and music background. Pt shared about these and chose to hear a [employer]. MT sang and played Your Cheatin' Heart with [employer]. Pt's affect brightened and he gave MT a \"thumbs up\" in response to the music. Afterward, MT asked the pt about to drawings that appeared to have been drawn and colored by children. Pt's affect brightened significantly as he shared that his grandchildren gave them to him. MT asked pt about his hospitalization and then about what has been helping him cope. In this conversation, pt chose to hear a [employer]. He then chose [employer]. He smiled as MT sang and played this with A Fourth Act. Pt expressed enjoyment in the music and gratitude for the visit. Will follow as able. ANTONINA ChoudharyBC (Music Therapist-Board Certified) Spiritual Care Department Referral-based service

## 2020-07-31 NOTE — PROGRESS NOTES
[address] [facility] [address] [facility] in [place], [place] Inpatient Progress Note Patient name: Marycruz Rendon Patient : 1950 Patient MRN: [id number] Attending MD: Elizabeth Brown MD 
Date of service: 20 REASON FOR CONSULT: 
UTI in patient with LVAD PROCEDURE PERFORMED:  Cystoscopy, transurethral resection of large bladder tumor.  
POD 6 
 
24hr Events: 
Confused and restless Epistaxis Frequent PI events Palliative Care to meet with family MAPs 88-96mmHg PLAN: 
LVAD device high speed at 6000rpm and low speed at 5600rpm 
Cannot tolerate beta-blockers d/t severe RV failure Cannot tolerate ACE/ARB/ARNi due to persistent orthostasis despite increased fluid intake  
Cannot tolerate spironolactone due to IVVD and hyponatremia Continue digoxin 0.125 every other day, level 0.6; trend levels daily Monitor daily CardioMEMs readings, 16mmHg (20) Albumin 12.5% IV    
hydralazine 10mg po TID, keep MAPs 70-90mmHg LVEDD > 7cm; consider RHC to determine device speed Unable to tolerate anticoagulation due to hematuria; stable LDH off aspirin UA (20) large blood, RBC >100, mod leukocytes Urine cytology; pathology revealing: Papillary urothelial carcinoma, high-grade, with lamina propria invasion, No muscularis propria present for evaluation Diagnostic cystoscopy and TURBT (20) -  resection of large papillary and solid bladder tumor on posterior wall Per urology may require repeat resection in 3-4 weeks based on path report Nephrology consult appreciated 
bladder and renal US (20) No evidence for hydronephrosis or mass lesion, small left kidney Urology consult appreciated Manual bladder irrigation prn, gross hematuria w/ clots D/C IV zosyn (20) and changed to IV merrem per ID  
ID consult appreciated, assistance on ABX regimen Blood cultures (20) NGTD Blood cultures (20) NGTD Blood cultures (7/26/20) 1/4 bottles +GNRs Blood cultures (7/24/20) Pseudomonas in 2/4 bottles - susceptibilities available Wound culture (7/20/20) light staph aureus Gentamicin to Drive line exit site with daily dressing changes PICC placement for long term IV ABX- on hold thru weekend Elevated sed rate- 109 (7/22/20) Continue soren Q po BID Fiber con 625 po daily Nutrition consult, appreciate recs Continue keppra,  Level 15.5 (7/18/20)- recheck level D/C IV benadryl due to disorientation/confusion Klonopin 0.25mg TID prn for restlessness- monitor closely ABG 7.56 CO2 20.7 pO2 109  HCO3 18.7 sO2 99% (7/30/20) -resp alkalosis, metabolic acidosis and met alkalosis Started sodium bicarb 650mg po BID Hospitalist assistance appreciated DNR/DDNR Daily weights, regular cardiac diet, strict I/O Trend CBC, CMP, NTproBNP, Mag, LD, procalcitonin, digoxin, lactic acid Use saline spray prn and Afrin spray with gauze for epistaxis, encourage to keep hands away from nares Dispo: Remain in CVSU. Family met with palliative care and would like to wait to see how Julia Joyner does throughout the weekend before making any care decisions. He is currently requiring lifelong IV ABX for pseudomonas bacteremia and possible repeat bladder resection in 3-4 weeks based on path report.                                                                                                                                                                     
   
  
IMPRESSION: 
Bladder Carcinoma Gross Hematuria UTI Metabolic encephalopathy Chronic systolic heart failure Stage D, NYHA class II symptoms Coronary artery disease Ischemic cardiomyopathy, LVEF 15% S/p HM3 LVAD as destination therapy (11/18/19 by Dr. Tripp Moore) S/p Impella 5.0 as bridge to LVAD 
HTN, goal MAP 70-90mmHg H/o VT s/p [facility] BIV-ICD PAF 
H/o recurrent UTI, pseudomonal infection Unable to tolerate AC due to hematuria MSSA drive line infection COPD 
 JOSE 
Essential tremor on keppra Depression on effexor Persistently elevated CEA PET scan increased RML activity, not malignant per hemonc Orthostatic hypotension DNR Up-to-date with PNA vaccine - per PCP/wife 
2/4bottles blood cultures +pseudomonas (7/24/20) diarrhea Epistaxis Confusion CARDIAC IMAGING: 
TTE 4/20 shows large LVIDd, 6.84 cm, RVIDd 3.06 cm, TAPSE 1.15 cm, severely elevated CVP 
TTE 7/6/20- Mild AI, LViDd 6.91cm, RVIDd 5.16cm, TAPSE 1.39cm Echo (7/18/20) · Contrast used: DEFINITY. · Image quality for this study was technically difficult. · Severely dilated left ventricle. Wall thickness appears thin. Severe systolic function. Estimated left ventricular ejection fraction is <15%. Visually measured ejection fraction. · Moderately dilated left atrium. · Moderately dilated right ventricle. Moderately to severely reduced systolic function. · Dilated right atrium. · Moderate mitral valve prolapse. 
  
 
LVAD INTERROGATION: 
Device interrogated in person, Increased Speed to 6000rpm, low speed to 5600rpm 
Device function normal, normal flow, multiple PI events LVAD Pump Speed (RPM): 6000 Pump Flow (LPM): 5 MAP: 100 PI (Pulsitility Index): 3.9 Power: 4.7  Test: No 
Back Up  at Bedside & Labeled: Yes Power Module Test: No 
Driveline Site Care: No 
Driveline Dressing: Clean, Dry, and Intact Outpatient: No 
MAP in Therapeutic Range (Outpatient): Yes Testing Alarms Reviewed: Yes 
Back up SC speed: 600 Back up Low Speed Limit: 5600 Emergency Equipment with Patient?: Yes Emergency procedures reviewed?: Yes Drive line site inspected?: Yes Drive line intergrity inspected?: Yes Drive line dressing changed?: Yes PHYSICAL EXAM: 
Visit Vitals /90 (BP 1 Location: Right arm, BP Patient Position: At rest) Pulse 90 Temp 98.5 °F (36.9 °C) Resp 18 Ht 6' 2\" (1.88 m) Wt 166 lb 10.7 oz (75.6 kg) SpO2 98% BMI 21.40 kg/m² Review of Systems Constitutional: Negative for chills, fever, malaise/fatigue and weight loss. HENT: Positive for hearing loss. Eyes: Negative. Respiratory: Positive for shortness of breath. Cardiovascular: Negative for chest pain, palpitations, orthopnea and leg swelling. Gastrointestinal: Negative. Genitourinary: Positive for hematuria. Negative for dysuria, flank pain and urgency. Musculoskeletal: Negative. Skin: Negative. Neurological: Positive for weakness. Endo/Heme/Allergies: Bruises/bleeds easily. Psychiatric/Behavioral: Positive for depression. Physical Exam 
Vitals signs and nursing note reviewed. Constitutional:   
   General: He is not in acute distress. Appearance: He is ill-appearing. Comments: Restless in bed, mumbling, family at bedside HENT:  
   Head: Normocephalic. Nose:  
   Right Nostril: Epistaxis present. Comments: Epistaxis 2/2 picking nose Mouth/Throat:  
   Mouth: Mucous membranes are moist.  
   Dentition: Has dentures. Neck:  
   Vascular: No JVD. Cardiovascular:  
   Rate and Rhythm: Normal rate and regular rhythm. Occasional extrasystoles are present. Heart sounds: Murmur present. Comments: +lvad hum Pulmonary:  
   Effort: Pulmonary effort is normal.  
   Breath sounds: Normal breath sounds. Abdominal:  
   General: Bowel sounds are normal.  
   Palpations: Abdomen is soft. Genitourinary: 
   Comments: Lizama catheter draining clear yellow Musculoskeletal:  
   Right lower leg: Edema present. Left lower leg: Edema present. Skin: 
   General: Skin is warm and dry. Capillary Refill: Capillary refill takes 2 to 3 seconds. Findings: Bruising present. Comments: Scattered bruising on extremities Neurological:  
   Mental Status: He is alert. He is disoriented. Motor: Weakness present. Comments: Restless, moaning Psychiatric:     
 Attention and Perception: He is inattentive. Speech: Speech normal.  
 
 
 
 
 
PAST MEDICAL HISTORY: 
Past Medical History:  
Diagnosis Date  BPH (benign prostatic hyperplasia)  CHF [facility]estive heart failure) [facility]  Degenerative disc disease, lumbar  Heart failure ([facility]  High cholesterol  Hypertension  Ischemic cardiomyopathy  LVAD (left ventricular assist device) present ([facility]  Paroxysmal atrial fibrillation ([facility] 4/2/2019  Spinal stenosis PAST SURGICAL HISTORY: 
Past Surgical History:  
Procedure Laterality Date  COLONOSCOPY Left 11/11/2019 COLONOSCOPY at bedside performed by Leida Ibrahim MD at [address]  HX CORONARY ARTERY BYPASS GRAFT    
 triple  HX HEART ASSIST DEVICE Left 10/29/2019 Impella 5.0  
 HX HEART ASSIST DEVICE Left 11/18/2019 HeartMate 3  
 HX HERNIA REPAIR    
 HX IMPLANTABLE CARDIOVERTER DEFIBRILLATOR  HX THROMBECTOMY Left 11/25/2019 Left brachial thrombectomy  PA CARDIOVERSION ELECTIVE ARRHYTHMIA EXTERNAL N/A 6/10/2019 EP CARDIOVERSION performed by Pedro High MD at [facility] [address] CATH LAB  PA CARDIOVERSION ELECTIVE ARRHYTHMIA EXTERNAL N/A 6/18/2019 EP CARDIOVERSION performed by Neill Pallas, MD at [address] [place] [address] CATH LAB  PA INSJ/RPLCMT PERM DFB W/TRNSVNS LDS 1/DUAL CHMBR N/A 6/21/2019 INSERT ICD BIV MULTI performed [place] Robbins [place] [address] CATH LAB  PA TCAT IMPL WRLS P-ART PRS SNR L-T HEMODYN MNTR N/A 9/18/2019 IMPLANT HEART FAILURE MONITORING DEVICE performed [place] Ally Neal [place] [address] CATH LAB FAMILY HISTORY: 
Family History Problem Relation Age of Onset  Lung Disease Mother  Hypertension Mother [facility] Arthritis-osteo Mother  Heart Disease Mother  Heart Disease Father  Diabetes Father SOCIAL HISTORY: 
Social History Socioeconomic History  Marital status:   
 Spouse name: Not on file  Number of children: Not on file  Years of education: Not on file  Highest education level: Not on file Tobacco Use  Smoking status: Former Smoker Last attempt to quit: 2010 Years since quittin.6  Smokeless tobacco: Never Used Substance and Sexual Activity  Alcohol use: Not Currently Comment: rarely  Drug use: Never Other Topics Concern LABORATORY RESULTS: 
  
Labs Latest Ref Rng & Units 2020 WBC 4.1 - 11.1 K/uL 5.7 5.8 5.7 5.9 7.3 - 6.1  
RBC 4.10 - 5.70 M/uL 2.50(L) 2.49(L) 2.57(L) 2.54(L) 2.60(L) - 2.47(L) Hemoglobin 12.1 - 17.0 g/dL 7. 4(L) 7. 4(L) 7. 9(L) 7. 5(L) 7. 7(L) 7. 6(L) 7. 4(L) Hematocrit 36.6 - 50.3 % 23. 9(L) 23. 6(L) 24. 6(L) 24. 6(L) 25. 0(L) - 24. 2(L) MCV 80.0 - 99.0 FL 95.6 94.8 95.7 96.9 96.2 - 98.0 Platelets 335 - 055 K/uL 157 157 179 169 170 - 180 Lymphocytes 12 - 49 % - - - - - - - Monocytes 5 - 13 % - - - - - - - Eosinophils 0 - 7 % - - - - - - - Basophils 0 - 1 % - - - - - - - Albumin 3.5 - 5.0 g/dL 2. 8(L) 2. 7(L) 2. 7(L) 2. 6(L) 2. 8(L) - 2. 7(L) Calcium 8.5 - 10.1 MG/DL 8.9 8.6 8.5 8.6 8.6 - 8.7 Glucose 65 - 100 mg/dL 96 94 91 94 100 - 96 BUN 6 - 20 MG/DL 23(H) 25(H) 22(H) 24(H) 21(H) - 23(H) Creatinine 0.70 - 1.30 MG/DL 1.29 1.34(H) 1.31(H) 1.34(H) 1.30 - 1.27 Sodium 136 - 145 mmol/L 134(L) 134(L) 136 135(L) 136 - 137 Potassium 3.5 - 5.1 mmol/L 4.0 4.2 4.4 4.4 4.1 - 3.9 TSH 0.36 - 3.74 uIU/mL - - - - - - -  
PSA 0.01 - 4.0 ng/mL - - - - - - -  
LDH 85 - 241 U/L 209 165 191 184 167 - 166 Some recent data might be hidden Lab Results Component Value Date/Time TSH 1.13 2020 11:28 AM  
 TSH 1.70 2020 01:59 PM  
 TSH 2.30 2019 03:29 AM  
 TSH 2.40 10/25/2019 07:39 PM  
 TSH 2.45 2019 04:16 AM  
 
 
ALLERGY: 
Allergies Allergen Reactions  Cefepime Other (comments)  
  myoclonus CURRENT MEDICATIONS: 
 
Current Facility-Administered Medications:  
  sodium bicarbonate tablet 650 mg, 650 mg, Oral, BID, Bk Lord MD, 650 mg at 07/31/20 2082   sodium chloride (OCEAN) 0.65 % nasal squeeze bottle 2 Spray, 2 Spray, Both Nostrils, Q2H PRN, Jacky Sherie, NP, 2 Spray at 07/30/20 0003   oxymetazoline (AFRIN) 0.05 % nasal spray 2 Spray, 2 Spray, Both Nostrils, BID PRN, Jacky Sherie, NP, 2 Spray at 07/30/20 0003   hydrALAZINE (APRESOLINE) tablet 10 mg, 10 mg, Oral, TID, Jacky Rehman, NP, 10 mg at 07/31/20 0813 
  gentamicin (GARAMYCIN) 0.1 % cream, , Topical, BID Mon Wed & Fri, Jacky Rehman NP, Stopped at 07/31/20 0900   meropenem (MERREM) 500 mg in 0.9% sodium chloride (MBP/ADV) 50 mL, 500 mg, IntraVENous, Q6H, Geoff Godfrey MD, Last Rate: 100 mL/hr at 07/31/20 1305, 500 mg at 07/31/20 1305   hydrALAZINE (APRESOLINE) 20 mg/mL injection 10 mg, 10 mg, IntraVENous, Q4H PRN, Jacky Rehman NP, 10 mg at 07/27/20 2036   epoetin yvonne-epbx (RETACRIT) injection 10,000 Units, 10,000 Units, SubCUTAneous, Q MON, WED & FRI, Greta Kaur MD, 10,000 Units at 07/29/20 2112 
  0.9% sodium chloride infusion 250 mL, 250 mL, IntraVENous, PRN, Levi Shana B, NP 
  calcium polycarbophiL (FIBERCON) tablet 625 mg, 1 Tab, Oral, DAILY, Jacky Rheman NP, 625 mg at 07/31/20 8611   sodium chloride (NS) flush 5-40 mL, 5-40 mL, IntraVENous, Q8H, Madhuri Zhao DO, 10 mL at 07/31/20 1306   sodium chloride (NS) flush 5-40 mL, 5-40 mL, IntraVENous, PRN, Sherrin Paula M, DO 
  acetaminophen (TYLENOL) tablet 650 mg, 650 mg, Oral, Q4H PRN, Matrin Paula M, DO, 650 mg at 07/27/20 2322   ondansetron (ZOFRAN) injection 4 mg, 4 mg, IntraVENous, Q4H PRN, Noel Mitchell M, DO, 4 mg at 07/22/20 4027   bisacodyL (DULCOLAX) tablet 5 mg, 5 mg, Oral, DAILY PRN, Madhuri Santo, DO 
   digoxin (LANOXIN) tablet 0.125 mg, 0.125 mg, Oral, EVERY OTHER DAY, Segundo Mage M, DO, 0.125 mg at 07/30/20 1026 
  finasteride (PROSCAR) tablet 5 mg, 5 mg, Oral, DAILY, Zhao, Hyun M, DO, 5 mg at 07/31/20 1229 
  lactobac ac& pc-s.therm-b.anim (TIAN Q/RISAQUAD), 1 Cap, Oral, DAILY, ZhaoMadhuri curran, DO, 1 Cap at 07/31/20 9995   levETIRAcetam (KEPPRA) tablet 250 mg, 250 mg, Oral, BID, Segundo Mage M, DO, 250 mg at 07/31/20 2393   tamsulosin (FLOMAX) capsule 0.8 mg, 0.8 mg, Oral, QHS, Zhao, Hyun M, DO, 0.8 mg at 07/30/20 2133 
  venlafaxine-SR (EFFEXOR-XR) capsule 150 mg, 150 mg, Oral, DAILY WITH BREAKFAST, Hyun Zhao M, DO, 150 mg at 07/31/20 0983 Thank you for allowing me to participate in this patient's care. TIERRA Gabriel [address] [facility] 
[address] [facility], [address] Phone: [phone] Fax: [phone]

## 2020-07-31 NOTE — PROGRESS NOTES
0745 Bedside and Verbal shift change report given to Kar Brumfield (oncoming nurse) by Connie Lorenzo RN (offgoing nurse). Report included the following information SBAR, Kardex, Intake/Output, MAR, Recent Results, Med Rec Status and Cardiac Rhythm Paced. 0815 Pt removes mitts once donned. Attempted x2 this morning. 36 Pt family at bedside. 1400 Pt pulled out IV. Discussed with wife regarding PICC placement. Pt remains restless and requested to wait until tomorrow. She will be here in the morning. [phone] [id number] Urology NP Jaskaran Valdez may try voiding trial over the weekend or may leave the Lizama until re-evaluation on Monday. 1730 Pt found pulling at driveline and Lizama. When asked, pt did not know what an LVAD was. 1401 Mercy Cid, NP, Jaksaran Valdez for a sitter. 1810 Pt found pulling on driveline again and warning about the \"rat traps\" on the floor. Epistaxis. Gauze applied to left nare. 1915 Replaced Lizama anchor and LVAD driveline anchor due to pt pulling. 1935 Bedside and Verbal shift change report given to Connie Lorenzo RN (oncoming nurse) by Kar Brumfield (offgoing nurse). Report included the following information SBAR, Kardex, Intake/Output, MAR, Recent Results, Med Rec Status and Cardiac Rhythm Paced. 2100 LVAD dressing changed completed. Noted the driveline looked slightly dislodged. Vishal Marrufo RN to have AHF to evaluate. Problem: Falls - Risk of 
Goal: *Absence of Falls Description: Document Mj Luz Fall Risk and appropriate interventions in the flowsheet. Outcome: Progressing Towards Goal 
Note: Fall Risk Interventions: 
Mobility Interventions: Communicate number of staff needed for ambulation/transfer, Patient to call before getting OOB, Strengthening exercises (ROM-active/passive), Utilize gait belt for transfers/ambulation, Utilize walker, cane, or other assistive device, Bed/chair exit alarm Mentation Interventions: Adequate sleep, hydration, pain control, Door open when patient unattended, More frequent rounding, Reorient patient, Room close to nurse's station, Update white board Medication Interventions: Evaluate medications/consider consulting pharmacy, Patient to call before getting OOB, Teach patient to arise slowly, Utilize gait belt for transfers/ambulation, Bed/chair exit alarm Elimination Interventions: Call light in reach, Patient to call for help with toileting needs, Stay With Me (per policy), Toilet paper/wipes in reach, Toileting schedule/hourly rounds, Bed/chair exit alarm Problem: Patient Education: Go to Patient Education Activity Goal: Patient/Family Education Outcome: Progressing Towards Goal 
  
Problem: LVAD: Discharge Outcomes Goal: *Hemodynamically stable Outcome: Progressing Towards Goal 
Goal: *Lungs clear or at baseline Outcome: Progressing Towards Goal 
Goal: *Tolerating diet Outcome: Progressing Towards Goal 
  
Problem: Pressure Injury - Risk of 
Goal: *Prevention of pressure injury Description: Document Mich Scale and appropriate interventions in the flowsheet. Outcome: Progressing Towards Goal 
Note: Pressure Injury Interventions: 
Sensory Interventions: Check visual cues for pain, Float heels, Keep linens dry and wrinkle-free, Maintain/enhance activity level, Minimize linen layers, Pressure redistribution bed/mattress (bed type) Moisture Interventions: Absorbent underpads, Internal/External urinary devices, Minimize layers, Check for incontinence Q2 hours and as needed Activity Interventions: Chair cushion, Increase time out of bed, Pressure redistribution bed/mattress(bed type) Mobility Interventions: Chair cushion, Pressure redistribution bed/mattress (bed type) Nutrition Interventions: Document food/fluid/supplement intake Friction and Shear Interventions: Lift sheet, Minimize layers Problem: Patient Education: Go to Patient Education Activity Goal: Patient/Family Education Outcome: Progressing Towards Goal

## 2020-07-31 NOTE — PROGRESS NOTES
1930: Bedside shift change report given to Andrea Walker (oncoming nurse) by Lincoln Choi (offgoing nurse). Report included the following information SBAR, Kardex, Intake/Output, MAR, Recent Results, and Cardiac Rhythm paced . Problem: Falls - Risk of 
Goal: *Absence of Falls Description: Document Eleanor Lee Fall Risk and appropriate interventions in the flowsheet. Outcome: Progressing Towards Goal 
Note: Fall Risk Interventions: 
Mobility Interventions: Communicate number of staff needed for ambulation/transfer, Patient to call before getting OOB Mentation Interventions: Door open when patient unattended, Adequate sleep, hydration, pain control, Reorient patient, More frequent rounding Medication Interventions: Patient to call before getting OOB, Teach patient to arise slowly Elimination Interventions: Call light in reach, Patient to call for help with toileting needs, Toileting schedule/hourly rounds

## 2020-07-31 NOTE — PROGRESS NOTES
Update - 
200 - Approached by patient's wife, Jose Ac, who requested to speak with myself and [facility]'s clinical team ([facility] and [facility] or NP). Patient had a decline in overall condition over the last 24hrs. Intermittent confusion, restless, nose bleed, moaning; however, patient is able to verbalize where he is and who is in the room. Jose Ac was appropriately emotional and upset with patient's appearance and behavior. 1600 - patient's wife inquired into hospice care for patient. She states that patient would not want to live this way and wants to see him comfortable. CM advised we can talk with the treatment team and go over options for what hospice with a LVAD \"looks like\". Jose Ac had several questions about will patient suffer, what is causing his change in behavior over the last 24hrs (IV abx side effect? LVAD? Bladder cancer? Infection?)  CM deferred questions on the cause of change in behavior to clinical treatment team. 
 
1630 [facility] NP updated by CM on changes and will re-consult Palliative Care team to have a family meeting tomorrow to discuss options. CM will continue to follow.  
 
Meena Grove, MPH

## 2020-07-31 NOTE — PROGRESS NOTES
ID Progress Note 2020 Subjective:  
 
Doing better overall, still fatigues easily--somewhat depressed, no physical complaints, however. Cystoscopy with removal of lesion--high-grade malignancy, apparently Objective: Antibiotics: 1. Zosyn 2. Madlyn Lucio Vitals:  
Visit Vitals /90 (BP 1 Location: Right arm, BP Patient Position: At rest) Pulse 95 Temp 98.2 °F (36.8 °C) Resp 18 Ht 6' 2\" (1.88 m) Wt 75.6 kg (166 lb 10.7 oz) SpO2 95% BMI 21.40 kg/m² Tmax:  Temp (24hrs), Av.2 °F (36.8 °C), Min:96.7 °F (35.9 °C), Max:99 °F (37.2 °C) Exam:  Lungs:  clear to auscultation bilaterally Heart:  regular rate and rhythm Abdomen:  soft, non-tender. Bowel sounds normal. No masses,  no organomegaly Labs:     
Recent Labs  
  20 
0434 20 
0315 20 
2157 WBC 5.7 5.8 5.7 HGB 7.4* 7.4* 7.9*  
 157 179 BUN 23* 25* 22* CREA 1.29 1.34* 1.31*  95 90 TBILI 0.5 0.5 0.4 Cultures: No results found for: SDES Lab Results Component Value Date/Time Culture result: NO GROWTH AFTER 18 HOURS 2020 12:29 PM  
 Culture result: NO GROWTH 3 DAYS 2020 01:43 PM  
 Culture result: (A) 2020 01:23 PM  
  PSEUDOMONAS AERUGINOSA GROWING IN 1 OF 4 BOTTLES DRAWN (SITE = [facility]) Culture result: (A) 2020 01:23 PM  
  PRELIMINARY REPORT OF GRAM NEGATIVE RODS GROWING IN 1 OF 4 BOTTLES DRAWN CALLED TO AND READ BACK BY MS ROSALIND WILKES AT  ON 20. PJ  
 Culture result: REMAINING BOTTLE(S) HAS/HAVE NO GROWTH SO FAR 2020 01:23 PM  
 
 
Radiology:  
 
Line/Insert Date:        
 
Assessment: 1. Drive line infection 2. Recurrent Pseudomonas bacteremia--persistent--last cultures negative to date 3. Organism resistant to oral antibiotic options 4. Options include--lifelong antibiotic therapy, move LVAD or Hospice Objective: 1. Continue Merrem in face of ongoing bacteremia-- cultures are negative so far 2. Merrem may be causing some of his restlessness, but seems to be working vs the Pseudomonas 3. Case management looking into coverage options 4. Tough situation Willow Riley MD

## 2020-07-31 NOTE — PROGRESS NOTES
Physical Therapy 7/31/2020 Chart reviewed. Note family meeting planned for 10:30 this morning to discuss plan of care and goals in setting of patient's decline. F/u later today as able/apporpriate pending outcome of meeting. Thank you.  
 
Claire Webber, PT, DPT

## 2020-07-31 NOTE — PROGRESS NOTES
Nephrology Progress Note [address] Date of Admission : 7/17/2020 CC: Follow up for JUAN J on CKD Assessment and Plan JUAN J on CKD 
- 2/2 volume depletion--> resolved - U/S neg for hydronephrosis . Gross hematuria resolved - Cr has been back to baseline and stable  
- labs daily over weekend - Dr Patsy Carrillo covering me over weekend if any renal issues Acid Base : 
- Triple disorder  
- started Oral Bicarb 650 mg BID HFrEF: 
- EF 16-20%, NYHA Class IV , hx of VF arrest - s/p AICD 
- s/p LVAD placement on 11/18 
  
CKD Stage III  
- Mild Left renal atrophy at baseline  
- baseline Cr around 1.2 to 1.4 BPH w/ hx of urinary retention: 
- bladder scan and SC if residual > 500 
- on flomax and proscar 
  
Bladder tumor: 
- cysto and resection on 7/23 
- per urology PAF s/p DCCV 6/19 
  
Anemia: 
- IV x 3 doses 
- on PAVEL Pseudomonas bacteremia/ LVAD drive line infection : 
- on meropenem  
- per ID Interval History: 
Seen and examined. Was confused yesterday and slightly today Nose bleeds overnight No fevers Renal function stable, acidosis slightly worse ABG showed resp alkalosis, metabolic acidosis and met alkalosis Current Medications: all current  Medications have been eviewed in [facility] \[facility]\"" Review of Systems: Pertinent items are noted in HPI. Objective: 
Vitals:   
Vitals:  
 07/30/20 1153 07/30/20 1507 07/30/20 2012 07/31/20 0003 BP:      
Pulse: 94 94 89 91 Resp: 18 18 18 18 Temp: 99.1 °F (37.3 °C) 98.7 °F (37.1 °C) 99 °F (37.2 °C) (!) 96.7 °F (35.9 °C) SpO2: 95% 100% 100% 95% Weight:      
Height:      
 
Intake and Output: 
07/30 1901 - 07/31 0700 In: -  
Out: 1100 [Urine:1100] 07/29 0701 - 07/30 1900 In: 3389 [P.O.:1095; I.V.:700] Out: 2055 [Urine:2055] Physical Examination: 
 
General: NAD,Conversant HEENT            Epistaxis, clotted blood in nares Neck:  Supple, no mass Resp:  Lungs CTA B/L 
CV:  RRR,  + VAD sounds, no LE edema GI:  Soft, NT, + BS Neurologic:  Non focal 
Psych:             AAO x 3 appropriate affect  : No neal []    High complexity decision making was performed 
[]    Patient is at high-risk of decompensation with multiple organ involvement Lab Data Personally Reviewed: I have reviewed all the pertinent labs, microbiology data and radiology studies during assessment. Recent Labs  
  07/31/20 0434 07/30/20 0315 07/29/20 
0156 * 134* 136  
K 4.0 4.2 4.4  
 105 105 CO2 19* 22 24 GLU 96 94 91 BUN 23* 25* 22* CREA 1.29 1.34* 1.31* CA 8.9 8.6 8.5 MG 2.2 2.3 2.4 ALB 2.8* 2.7* 2.7* ALT 13 13 14 Recent Labs  
  07/31/20 0434 07/30/20 0315 07/29/20 
2364 WBC 5.7 5.8 5.7 HGB 7.4* 7.4* 7.9*  
HCT 23.9* 23.6* 24.6*  
 157 179 No results found for: SDES Lab Results Component Value Date/Time Culture result: NO GROWTH 2 DAYS 07/28/2020 01:43 PM  
 Culture result: (A) 07/26/2020 01:23 PM  
  PSEUDOMONAS AERUGINOSA GROWING IN 1 OF 4 BOTTLES DRAWN (SITE = [facility]) Culture result: (A) 07/26/2020 01:23 PM  
  PRELIMINARY REPORT OF GRAM NEGATIVE RODS GROWING IN 1 OF 4 BOTTLES DRAWN CALLED TO AND READ BACK BY MS ROSALIND WILKES AT 2000 ON 7/27/20. PJ  
 Culture result: REMAINING BOTTLE(S) HAS/HAVE NO GROWTH SO FAR 07/26/2020 01:23 PM  
 
Recent Results (from the past 24 hour(s)) POC EG7 Collection Time: 07/30/20  7:41 AM  
Result Value Ref Range Calcium, ionized (POC) 1.22 1.12 - 1.32 mmol/L  
 pH (POC) 7.56 (HH) 7.35 - 7.45    
 pCO2 (POC) 20.7 (L) 35.0 - 45.0 MMHG  
 pO2 (POC) 109 (H) 80 - 100 MMHG  
 HCO3 (POC) 18.7 (L) 22 - 26 MMOL/L Base deficit (POC) 3 mmol/L  
 sO2 (POC) 99 (H) 92 - 97 % Site RIGHT RADIAL Device: ROOM AIR Allens test (POC) YES Specimen type (POC) ARTERIAL    
LACTIC ACID Collection Time: 07/30/20  4:22 PM  
Result Value Ref Range Lactic acid 1.6 0.4 - 2.0 MMOL/L  
DIGOXIN  Collection Time: 07/31/20  4:34 AM  
 Result Value Ref Range Digoxin level 0.6 (L) 0.90 - 2.00 NG/ML  
CBC W/O DIFF Collection Time: 07/31/20  4:34 AM  
Result Value Ref Range WBC 5.7 4.1 - 11.1 K/uL  
 RBC 2.50 (L) 4.10 - 5.70 M/uL HGB 7.4 (L) 12.1 - 17.0 g/dL HCT 23.9 (L) 36.6 - 50.3 % MCV 95.6 80.0 - 99.0 FL  
 MCH 29.6 26.0 - 34.0 PG  
 MCHC 31.0 30.0 - 36.5 g/dL  
 RDW 17.1 (H) 11.5 - 14.5 % PLATELET 396 067 - 619 K/uL MPV 9.5 8.9 - 12.9 FL  
 NRBC 0.0 0  WBC ABSOLUTE NRBC 0.00 0.00 - 0.01 K/uL LD Collection Time: 07/31/20  4:34 AM  
Result Value Ref Range  85 - 241 U/L  
NT-PRO BNP Collection Time: 07/31/20  4:34 AM  
Result Value Ref Range NT pro-BNP 7,087 (H) <125 PG/ML  
PROCALCITONIN Collection Time: 07/31/20  4:34 AM  
Result Value Ref Range Procalcitonin 0.68 ng/mL MAGNESIUM Collection Time: 07/31/20  4:34 AM  
Result Value Ref Range Magnesium 2.2 1.6 - 2.4 mg/dL METABOLIC PANEL, COMPREHENSIVE Collection Time: 07/31/20  4:34 AM  
Result Value Ref Range Sodium 134 (L) 136 - 145 mmol/L Potassium 4.0 3.5 - 5.1 mmol/L Chloride 105 97 - 108 mmol/L  
 CO2 19 (L) 21 - 32 mmol/L Anion gap 10 5 - 15 mmol/L Glucose 96 65 - 100 mg/dL BUN 23 (H) 6 - 20 MG/DL Creatinine 1.29 0.70 - 1.30 MG/DL  
 BUN/Creatinine ratio 18 12 - 20 GFR est AA >60 >60 ml/min/1.73m2 GFR est non-AA 55 (L) >60 ml/min/1.73m2 Calcium 8.9 8.5 - 10.1 MG/DL Bilirubin, total 0.5 0.2 - 1.0 MG/DL  
 ALT (SGPT) 13 12 - 78 U/L  
 AST (SGOT) 10 (L) 15 - 37 U/L Alk. phosphatase 102 45 - 117 U/L Protein, total 7.5 6.4 - 8.2 g/dL Albumin 2.8 (L) 3.5 - 5.0 g/dL Globulin 4.7 (H) 2.0 - 4.0 g/dL A-G Ratio 0.6 (L) 1.1 - 2.2 Rupert Boone MD 
[address] [place] [address] Phone - [phone] Fax - [phone] 
[email or web address]

## 2020-07-31 NOTE — PROGRESS NOTES
Palliative Medicine Social Work Dr. Emily Bocanegra and I along with AP lLamas met with patient and his family in room. Patient oriented to self and family Discussed options for patient: a. Keep doing what we're doing, see how he does, b. Inpatient hospice with goal for home with hospice Family recognizes patient has been in an out of hospital since LVAD - really wants to be home, but doesn't like to complain. He has been more confused lately and but this change in mentation the last day or two is very different for him and marks a big change. Family wants to see how patient does over weekend - hoping there's some other med that can ease his agitation. They understand hospice is an option any time - especially if he declines over weekend. Will check in Monday with family to re-assess where he and they are. Wife and kids have good insight, working together to make care decisions. Also guided them to continue conversations about what they know patient would and wouldn't want for his care and how to live. Appreciate CM support. Thank you for the opportunity to be involved in the care of Mr. Geronimo Elizalde and his family. Lang Bamberger, LMSW, Supervisee in Social Work Palliative Medicine  [phone]

## 2020-08-01 NOTE — PROGRESS NOTES
0845: pt received in bed confused and spitting up clot of blood. Dary Thrasher NP made aware. Consult for ENT placed. Additionally pt is restless. 1039: received call from ENT DR. PATRICK NICOLE he will review chart for evaluation. 1100: Per DR PATRICK NICOLE the pt was previously seen by DR Gibran Osorio. Will have  call DR Gibran Osorio. 
 
4720: s/w DR Gibran Osorio, she has verified that that DR DORANTES. MT will call the other group again. 1220: s/w DR PATRICK NICOLE he will be coming to the bedside. 1300: DR PATRICK NICOLE to bedside to evaluate pt. 
 
5062: pt assisted OOB to chair. Wife at bedside. Sitter at bedside. 1930: Bedside and Verbal shift change report given to kesha valentine rn (oncoming nurse) by kelly ferrara rn (offgoing nurse). Report included the following information SBAR, Kardex, ED Summary, OR Summary, Procedure Summary, Intake/Output, MAR and Recent Results. Problem: Falls - Risk of 
Goal: *Absence of Falls Description: Document Donata Carrel Fall Risk and appropriate interventions in the flowsheet. Outcome: Progressing Towards Goal 
Note: Fall Risk Interventions: 
Mobility Interventions: Communicate number of staff needed for ambulation/transfer, Assess mobility with egress test 
 
Mentation Interventions: Evaluate medications/consider consulting pharmacy Medication Interventions: Evaluate medications/consider consulting pharmacy Elimination Interventions: Call light in reach, Bed/chair exit alarm, Elevated toilet seat, Stay With Me (per policy), Toilet paper/wipes in reach, Patient to call for help with toileting needs, Toileting schedule/hourly rounds Problem: Patient Education: Go to Patient Education Activity Goal: Patient/Family Education Outcome: Progressing Towards Goal 
  
Problem: Pressure Injury - Risk of 
Goal: *Prevention of pressure injury Description: Document Mich Scale and appropriate interventions in the flowsheet.  
Outcome: Progressing Towards Goal 
 Note: Pressure Injury Interventions: 
Sensory Interventions: Avoid rigorous massage over bony prominences Moisture Interventions: Absorbent underpads Activity Interventions: Increase time out of bed, Pressure redistribution bed/mattress(bed type), PT/OT evaluation Mobility Interventions: HOB 30 degrees or less, Pressure redistribution bed/mattress (bed type), PT/OT evaluation Nutrition Interventions: Document food/fluid/supplement intake, Offer support with meals,snacks and hydration, Discuss nutritional consult with provider Friction and Shear Interventions: HOB 30 degrees or less Problem: Patient Education: Go to Patient Education Activity Goal: Patient/Family Education Outcome: Progressing Towards Goal

## 2020-08-01 NOTE — PROGRESS NOTES
1930  Report received from [address] [place] [address]. 0515  Pt has been attended to by RN's during the entire shift. Pt has not been left attended. Pt thrashing about the bed during the night except from 9911-9924 at which time he did sleep. Pt has been coughing blood up all shift. At [facility] 20 he coughed up the packing that was placed in the left nare yesterday. At [facility] wife, a call was placed to his wife by a staff RN. Wife was asked to come to the hospital.  At [facility]  NP for heart failure was notified of Hgb 5.7. One unit of RBC's was ordered. Type and Cross was sent to the lab. Awaiting results and then will order and issue of 1 unit of RBC's. At 0545 Pt was given a complete CHG bath, linen change and gown change. Oral care was provided. Pt also had a small BM on the commode. At [address] did come in the see the patient. At that time patient was resting quietly and continues to do so. 200  Wife and son just arrived. They are discussing the continued use of antibiotics and the blood transfusion order. Will hold off on these treatments until the convey their decision.   0700 Wife and son are discussing comfort care with Shannan Luque NP.

## 2020-08-01 NOTE — PROGRESS NOTES
[facility] VAD/Heart Failure Progress Note Admit Date: 2020 POD:  9 Days Post-Op Procedure:  Procedure(s): 
CYSTO RETROGRADE PYELOGRAM/  BLADDER BIOPSY/ TURBT Subjective:  
Mild fatigue and weakness; confusion a little better Objective:  
Vitals: 
Blood pressure 110/90, pulse 84, temperature 98 °F (36.7 °C), resp. rate 16, height 6' 2\" (1.88 m), weight 164 lb 0.4 oz (74.4 kg), SpO2 98 %. Temp (24hrs), Av.1 °F (36.7 °C), Min:97.8 °F (36.6 °C), Max:98.5 °F (36.9 °C) Hemodynamics: 
 CO:   
 CI:   
 CVP:   
 SVR:   
 PAP Systolic:   
 PAP Diastolic:   
 PVR:   
 FL25:   
 SCV02:   
 
VAD Interrogation: LVAD (Heartmate) Pump Speed (RPM): 6000 Pump Flow (LPM): 5 Chatter in Lines: No 
PI (Pulsitility Index): 3.5 Power: 4.7 MAP: 84  Test: Yes 
Back Up  at Bedside & Labeled: Yes Power Module Test: Yes Driveline Site Care: No 
Driveline Dressing: Clean, Dry, and Intact EKG/Rhythm:   
 
Extubation Date / Time:  
 
CT Output:  
 
Ventilator: 
  
 
Oxygen Therapy: 
Oxygen Therapy O2 Sat (%): 98 % (20) Pulse via Oximetry: 80 beats per minute (20 0520) O2 Device: Room air (20) O2 Flow Rate (L/min): 2 l/min (20) CXR: 
 
Admission Weight: Last Weight Weight: 175 lb 0.7 oz (79.4 kg) Weight: 164 lb 0.4 oz (74.4 kg) Intake / Kamilah Valadez / Alessandra Lou: 
Current Shift:  07 -  1900 In: 680 [P.O.:480; I.V.:200] [facility] 1250 [facility] Last 24 hrs.:  
 
Intake/Output Summary (Last 24 hours) at 2020 7959 Last data filed at 2020 1539 Gross per 24 hour Intake 800 ml Output 1250 ml Net -450 ml No results for input(s): CPK, CKMB, TROIQ in the last 72 hours. Recent Labs 20 
1181 20 
2512 20 
0480 20 
0315 * 134* 134* 134* K 4.2 4.0 4.0 4.2 CO2 21 21 19* 22 BUN 29* 21* 23* 25* CREA 1.22 1.29 1.29 1.34* GLU 98 96 96 94 PHOS 3.1  --   --   --   
MG  --  2.0 2.2 2.3 WBC  --  5.3 5.7 5.8 HGB  --  7.2* 7.4* 7.4* HCT  --  23.2* 23.9* 23.6* PLT  --  151 157 157 Recent Labs 08/01/20 
3703 INR 1.2* PTP 12.4* No lab exists for component: PBNP Current Facility-Administered Medications:  
  ALPRAZolam (XANAX) tablet 0.25 mg, 0.25 mg, Oral, TID PRN, Laura Mckenzie NP, 0.25 mg at 08/01/20 9544   sodium chloride (OCEAN) 0.65 % nasal squeeze bottle 2 Spray, 2 Spray, Both Nostrils, TID, Fuad Smith MD, 2 Spray at 08/01/20 0370   sodium bicarbonate tablet 650 mg, 650 mg, Oral, BID, Logan Kincaid MD, 650 mg at 08/01/20 1710 
  oxymetazoline (AFRIN) 0.05 % nasal spray 2 Spray, 2 Spray, Both Nostrils, BID PRN, Laura Mckenzie NP, 2 [place] at 07/31/20 9609   hydrALAZINE (APRESOLINE) tablet 10 mg, 10 mg, Oral, TID, Laura Mckenzie NP, 10 mg at 08/01/20 1535 
  gentamicin (GARAMYCIN) 0.1 % cream, , Topical, BID Mon Wed & Fri, Laura Mckenzie, NP 
  meropenem (MERREM) 500 mg in 0.9% sodium chloride (MBP/ADV) 50 mL, 500 mg, IntraVENous, Q6H, Geoff Godfrey MD, Last Rate: 100 mL/hr at 08/01/20 1346, 500 mg at 08/01/20 1346   hydrALAZINE (APRESOLINE) 20 mg/mL injection 10 mg, 10 mg, IntraVENous, Q4H PRN, Laura Mckenzie NP, 10 mg at 07/27/20 2036   epoetin yvonne-epbx (RETACRIT) injection 10,000 Units, 10,000 Units, SubCUTAneous, Q MON, WED & FRI, Bright Ortiz MD, 10,000 Units at 07/31/20 2117 
  0.9% sodium chloride infusion 250 mL, 250 mL, IntraVENous, PRN, Shana Sims, NP 
  calcium polycarbophiL (FIBERCON) tablet 625 mg, 1 Tab, Oral, DAILY, Laura Mckenzie NP, 625 mg at 08/01/20 4536   sodium chloride (NS) flush 5-40 mL, 5-40 mL, IntraVENous, Q8H, Kitty Zhao, DO, 10 mL at 08/01/20 1345   sodium chloride (NS) flush 5-40 mL, 5-40 mL, IntraVENous, PRN, Nat Patten, Gilda Gee, DO 
   acetaminophen (TYLENOL) tablet 650 mg, 650 mg, Oral, Q4H PRN, Sandre Oxford M, DO, 650 mg at 07/27/20 2322   ondansetron (ZOFRAN) injection 4 mg, 4 mg, IntraVENous, Q4H PRN, Sandre Oxford M, DO, 4 mg at 07/22/20 6066   bisacodyL (DULCOLAX) tablet 5 mg, 5 mg, Oral, DAILY PRN, CARLOS [facility] M, DO 
  digoxin (LANOXIN) tablet 0.125 mg, 0.125 mg, Oral, EVERY OTHER DAY, Sandre Oxford M, DO, 0.125 mg at 08/01/20 0944 
  finasteride (PROSCAR) tablet 5 mg, 5 mg, Oral, DAILY, Hyun Zhao , DO, 5 mg at 08/01/20 0944 
  lactobac ac& pc-s.therm-b.anim (TIAN Guajardo), 1 Cap, Oral, DAILY, Madeline Goyal, DO, 1 Cap at 08/01/20 9644   levETIRAcetam (KEPPRA) tablet 250 mg, 250 mg, Oral, BID, Sandre Oxford M, DO, 250 mg at 08/01/20 1710   tamsulosin (FLOMAX) capsule 0.8 mg, 0.8 mg, Oral, QHS, Hyun Zhao, DO, 0.8 mg at 07/31/20 2117 
  venlafaxine-SR (EFFEXOR-XR) capsule 150 mg, 150 mg, Oral, DAILY WITH BREAKFAST, Hyun Zhao, DO, 150 mg at 08/01/20 0960 A/P 
S/P LVAD - good flows Need for anticoagulation - on hodl due to recurrent bleeds UTI - abx's Confusion - monitor 
  
Risk of morbidity and mortality - high Medical decision making - high complexity 
  
 
Signed By: Laura Segovia MD

## 2020-08-01 NOTE — PROGRESS NOTES
2000 Report received from Adolfo Olivera.   
0400  Pt has been very restless and anxious all night. He is asleep now. Will allow him to sleep as VS and MAPs have been stable.

## 2020-08-01 NOTE — PROGRESS NOTES
Problem: Falls - Risk of 
Goal: *Absence of Falls Description: Document Curt Arroyo Fall Risk and appropriate interventions in the flowsheet. Outcome: Progressing Towards Goal 
Note: Fall Risk Interventions: 
Mobility Interventions: Communicate number of staff needed for ambulation/transfer, Assess mobility with egress test 
 
Mentation Interventions: Evaluate medications/consider consulting pharmacy Medication Interventions: Evaluate medications/consider consulting pharmacy Elimination Interventions: Call light in reach, Bed/chair exit alarm, Elevated toilet seat, Stay With Me (per policy), Toilet paper/wipes in reach, Patient to call for help with toileting needs, Toileting schedule/hourly rounds

## 2020-08-01 NOTE — CONSULTS
Ears/Nose/Throat Consult Subjective:  
 
Date of Consultation:  2020 Referring Physician: Dr. Armani Kuhn History of Present Illness:  
Patient is a 71 y.o.  male who is being seen for epistaxis. he  was admitted to the hospital for Acute encephalopathy [G93.40] Acute encephalopathy [G93.40]. Long complicated h/o needing LVAD, bladder cancer. Not currently anticoagulated. Found to have septal perforation last fall of unknown etiology. Much of history obtained from wife. Pt somewhat agitated and with altered mental status. Have been using afrin off and on for mild bleeding, some clots coughed up at times. No natty heavy bleeding recently. Pt reportedly keeps picking left nasal cavity as well. Past Medical History:  
Diagnosis Date  BPH (benign prostatic hyperplasia)  CHF (congestive heart failure) ([address]  Degenerative disc disease, lumbar  Heart failure ([address]  High cholesterol  Hypertension  Ischemic cardiomyopathy  LVAD (left ventricular assist device) present ([address]  Paroxysmal atrial fibrillation ([address] 2019  Spinal stenosis Family History Problem Relation Age of Onset  Lung Disease Mother  Hypertension Mother [address] [place] [address] Arthritis-osteo Mother  Heart Disease Mother  Heart Disease Father  Diabetes Father Social History Tobacco Use  Smoking status: Former Smoker Last attempt to quit: 2010 Years since quittin.6  Smokeless tobacco: Never Used Substance Use Topics  Alcohol use: Not Currently Comment: rarely Past Surgical History:  
Procedure Laterality Date  COLONOSCOPY Left 2019 COLONOSCOPY at bedside performed by Mackenzie Hannon MD at [address] [facility] [place]  HX CORONARY ARTERY BYPASS GRAFT    
 triple  HX HEART ASSIST DEVICE Left 10/29/2019 Impella 5.0  
 HX HEART ASSIST DEVICE Left 2019  HeartMate 3  
 HX HERNIA REPAIR    
  HX IMPLANTABLE CARDIOVERTER DEFIBRILLATOR  HX THROMBECTOMY Left 11/25/2019 Left brachial thrombectomy  [place] CARDIOVERSION ELECTIVE ARRHYTHMIA EXTERNAL N/A 6/10/2019 EP CARDIOVERSION performed by Pauly Chu MD at [facility], [facility] [address] CATH LAB  [place] CARDIOVERSION ELECTIVE ARRHYTHMIA EXTERNAL N/A 6/18/2019 EP CARDIOVERSION performed by Candie Alvarado MD at [address] [place] [address], [facility] [address] CATH LAB  [place] INSJ/RPLCMT PERM DFB W/TRNSVNS LDS 1/DUAL CHMBR N/A 6/21/2019 INSERT ICD BIV MULTI performed by Debi Hoover MD at [facility], [facility] CATH LAB  [place] TCAT IMPL WRLS P-ART PRS SNR L-T HEMODYN MNTR N/A 9/18/2019 IMPLANT HEART FAILURE MONITORING DEVICE performed by Renee Garrett MD at [address] [place] [address], [facility] CATH LAB Current Facility-Administered Medications Medication Dose Route Frequency  ALPRAZolam (XANAX) tablet 0.25 mg  0.25 mg Oral TID PRN  
 silver nitrate applicators (ARZOL) 2 Applicator  2 Applicator Topical ONCE  
 sodium chloride (OCEAN) 0.65 % nasal squeeze bottle 2 Spray  2 Spray Both Nostrils TID  sodium chloride-aloe vera (AYR) nasal gel   Nasal NOW  sodium bicarbonate tablet 650 mg  650 mg Oral BID  
 oxymetazoline (AFRIN) 0.05 % nasal spray 2 Spray  2 Spray Both Nostrils BID PRN  
 hydrALAZINE (APRESOLINE) tablet 10 mg  10 mg Oral TID  gentamicin (GARAMYCIN) 0.1 % cream   Topical BID Mon Wed & Fri  
 meropenem (MERREM) 500 mg in 0.9% sodium chloride (MBP/ADV) 50 mL  500 mg IntraVENous Q6H  
 hydrALAZINE (APRESOLINE) 20 mg/mL injection 10 mg  10 mg IntraVENous Q4H PRN  
 epoetin yvonne-epbx (RETACRIT) injection 10,000 Units  10,000 Units SubCUTAneous Q MON, WED & FRI  
 0.9% sodium chloride infusion 250 mL  250 mL IntraVENous PRN  
 calcium polycarbophiL (FIBERCON) tablet 625 mg  1 Tab Oral DAILY  sodium chloride (NS) flush 5-40 mL  5-40 mL IntraVENous Q8H  
 sodium chloride (NS) flush 5-40 mL  5-40 mL IntraVENous PRN  
 acetaminophen (TYLENOL) tablet 650 mg  650 mg Oral Q4H PRN  
  ondansetron (ZOFRAN) injection 4 mg  4 mg IntraVENous Q4H PRN  
 bisacodyL (DULCOLAX) tablet 5 mg  5 mg Oral DAILY PRN  
 digoxin (LANOXIN) tablet 0.125 mg  0.125 mg Oral EVERY OTHER DAY  finasteride (PROSCAR) tablet 5 mg  5 mg Oral DAILY  lactobac ac& pc-s.therm-b.anim (TIAN Q/RISAQUAD)  1 Cap Oral DAILY  levETIRAcetam (KEPPRA) tablet 250 mg  250 mg Oral BID  tamsulosin (FLOMAX) capsule 0.8 mg  0.8 mg Oral QHS  venlafaxine-SR (EFFEXOR-XR) capsule 150 mg  150 mg Oral DAILY WITH BREAKFAST Allergies Allergen Reactions  Cefepime Other (comments)  
  myoclonus Review of Systems: 
Review of systems not obtained due to patient factors. Objective:  
 
Patient Vitals for the past 8 hrs: 
 Temp Pulse Resp SpO2 Weight 20 1102 98.5 °F (36.9 °C) 96 18 98 %   
20 0749 97.8 °F (36.6 °C) 88 18 99 %   
20 0554     74.4 kg (164 lb 0.4 oz) Temp (24hrs), Av.1 °F (36.7 °C), Min:97.8 °F (36.6 °C), Max:98.5 °F (36.9 °C) 
 
 190 -  0700 In: 1050 [P.O.:600; I.V.:450] Out: 1100 [Urine:1100] Physical Exam:  
General 
General Appearance- Well nourished adult in no apparent distress who follows commands but is comfused. Gait- not assessed Voice- not easily evaluated HEENT Head: Normally developed without evidence of trauma or lesions. Face: 
Skin:  Normal color, texture, and turgor. There are no lesions of concern nor swelling or induration. Lips- normal. 
Facial Nerve- Bilateral- function is equal and symmetrical with no deficit. Ear: Auricle- Left- Normal development without sinus pit, cyst or any other lesion. Right- Normal development without sinus pit, cyst or any other lesion Auditory Canal (otoscopic examination)  Left- clean, without edema, discharge, or lesions. Right  clean, without edema, discharge, or lesions.    Mastoid- Left- No erythema, edema, tenderness, or protrusion of the auricle. Right- No erythema, edema, tenderness, or protrusion of the auricle. Nose: External Nose- Normally developed without lesion. Nasal Mucosa- moist and pink without erythema, edema, or lesions. Nasal septum-perforation noted, minimal bleeding from posterior inferior edge, not heavy nop,  without lesion. Turbinates- Bilateral- The turbinates are without hypertrophy, edema, or lesion. Sinuses-  All sinuses are nontender to percussion. Oral Cavity/OropharynxDentition- Normal dentition for age witho no evidence of discoloration, inflammation, or infection. Oral Mucosa: moist without erythema or lesions. Tongue- no lesions or palpable masses. Floor of mouth- soft without palpable masses or mucosal lesion. Palate and uvula- Palate is without cleft and the uvula is not bifid. Soft palate elevates symmetrically. Tonsils- Bilateral -Normal in size without exudates or erythema. Salivary Ducts-  Ducts appear to be normal.  Throat: Pharynx- Normal mucosal lining without erythema or mass. Larynx: difficult to examine directly. Neck:-- Trachea- midline with normal laryngeal framework with no crepitus. Salivary Glands- normal to palpation without nodules or tenderness. Thyroid- normal to palpation without mass or irregularity. Lymph Nodes- No palpable adenopathy or masses. Range of Motion- good in all directions, with good anterior-posterior and lateral flexion and rotation. Chest and Lung Exam: Normal respiratory effort with no wheezing, retractions, or rales. Cardiovascular: Regular rate and rhythm Procedure:  Nasal cauterization Silver nitrate used to cauterize posterior inferior aspect of septal perforation. Tolerated well Assessment:  
Epistaxis. Hospital Problems  Date Reviewed: 7/23/2020 Codes Class Noted POA Acute encephalopathy ICD-10-CM: G93.40 ICD-9-CM: 348.30  7/17/2020 Unknown Bladder cancer, Heart failure S/p LVAD Plan: Very mild bleeding at this point, lack of anticoag should help. Believe this is all related to digital trauma and raw edges of septal perforation. Do not think pt would tolerate bilateral packs (would be needed with perforation), and only go that route for severe recalcitrant bleeding. At this point, focus on moisturization. Nasal saline now scheduled tid, added bid saline gel and vasoline to nares bid. Call for further issues. Afrin should be only used as needed for bleeding Signed By: Benito Lu MD   
 August 1, 2020

## 2020-08-01 NOTE — PROGRESS NOTES
[address] [facility] in [place], [place] Inpatient Progress Note Patient name: Ana Walters Patient : 1950 Patient MRN: [id number] Attending MD: Benita Sheridan MD 
Date of service: 20 REASON FOR CONSULT: 
UTI in patient with LVAD PROCEDURE PERFORMED:  Cystoscopy, transurethral resection of large bladder tumor.  
POD 7 
 
24hr Events: 
Confused and restless Epistaxis Frequent PI events MAPs 88-96mmHg PLAN: 
LVAD device high speed at 6000rpm and low speed at 5600rpm 
Cannot tolerate beta-blockers d/t severe RV failure Cannot tolerate ACE/ARB/ARNi due to persistent orthostasis despite increased fluid intake  
Cannot tolerate spironolactone due to IVVD and hyponatremia Continue digoxin 0.125 every other day, level 0.6; trend levels daily Monitor daily CardioMEMs readings, 16mmHg (20) Albumin 12.5% IV    
hydralazine 10mg po TID, keep MAPs 70-90mmHg LVEDD > 7cm; consider RHC to determine device speed Unable to tolerate anticoagulation due to hematuria; stable LDH off aspirin UA (20) large blood, RBC >100, mod leukocytes Urine cytology; pathology revealing: Papillary urothelial carcinoma, high-grade, with lamina propria invasion, No muscularis propria present for evaluation Diagnostic cystoscopy and TURBT (20) -  resection of large papillary and solid bladder tumor on posterior wall Per urology may require repeat resection in 3-4 weeks based on path report Nephrology consult appreciated 
bladder and renal US (20) No evidence for hydronephrosis or mass lesion, small left kidney Urology consult appreciated Manual bladder irrigation prn, for gross hematuria w/ clots D/C IV zosyn (20) and changed to IV merrem per ID  
ID consult appreciated, assistance on ABX regimen Blood cultures (20) NGTD Blood cultures (20) NGTD Blood cultures (20) 1/4 bottles +GNRs Blood cultures (7/24/20) Pseudomonas in 2/4 bottles - susceptibilities available Wound culture (7/20/20) light staph aureus Gentamicin to Drive line exit site with daily dressing changes PICC placement for long term IV ABX Elevated sed rate- 109 (7/22/20) Continue soren Q po BID Fiber con 625 po daily Nutrition consult, appreciate recs Continue keppra,  Level 15.5 (7/18/20)- recheck level D/C IV benadryl due to disorientation/confusion D/C Klonopin 0.25mg start xanax 0.25mg TID prn for restlessness Sitter order for safety re: confusion and pulling on lines and tubes ABG 7.56 CO2 20.7 pO2 109  HCO3 18.7 sO2 99% (7/30/20) -resp alkalosis, metabolic acidosis and met alkalosis  
sodium bicarb 650mg po BID Hospitalist assistance appreciated DNR/DDNR Daily weights, regular cardiac diet, strict I/O Trend CBC, CMP, NTproBNP, Mag, LD, procalcitonin, digoxin, lactic acid Use saline spray prn and Afrin spray with gauze for epistaxis, encourage to keep hands away from nares Consult ENT for epistaxis Dispo: Remain in [facility]. Family met with palliative care and would like to wait to see how Leatha Garcia does throughout the weekend before making any care decisions. He is currently requiring lifelong IV ABX for pseudomonas bacteremia and possible repeat bladder resection in 3-4 weeks based on path report.                                                                                                                                                                     
   
  
IMPRESSION: 
Bladder Carcinoma Gross Hematuria UTI Metabolic encephalopathy Chronic systolic heart failure Stage D, NYHA class II symptoms Coronary artery disease Ischemic cardiomyopathy, LVEF 15% S/p HM3 LVAD as destination therapy (11/18/19 by Dr. Martin Agarwal) S/p Impella 5.0 as bridge to LVAD 
HTN, goal MAP 70-90mmHg H/o VT s/p [facility] BIV-ICD PAF 
H/o recurrent UTI, pseudomonal infection Unable to tolerate AC due to hematuria MSSA drive line infection COPD 
JOSE Essential tremor on keppra Depression on effexor Persistently elevated CEA PET scan increased RML activity, not malignant per hemonc Orthostatic hypotension DNR Up-to-date with PNA vaccine - per PCP/wife 
2/4bottles blood cultures +pseudomonas (7/24/20) diarrhea Epistaxis Confusion CARDIAC IMAGING: 
TTE 4/20 shows large LVIDd, 6.84 cm, RVIDd 3.06 cm, TAPSE 1.15 cm, severely elevated CVP 
TTE 7/6/20- Mild AI, LViDd 6.91cm, RVIDd 5.16cm, TAPSE 1.39cm Echo (7/18/20) · Contrast used: DEFINITY. · Image quality for this study was technically difficult. · Severely dilated left ventricle. Wall thickness appears thin. Severe systolic function. Estimated left ventricular ejection fraction is <15%. Visually measured ejection fraction. · Moderately dilated left atrium. · Moderately dilated right ventricle. Moderately to severely reduced systolic function. · Dilated right atrium. · Moderate mitral valve prolapse. 
  
 
LVAD INTERROGATION: 
Device interrogated in person, Increased Speed to 6000rpm, low speed to 5600rpm 
Device function normal, normal flow, multiple PI events LVAD Pump Speed (RPM): 6000 Pump Flow (LPM): 5 MAP: 84 
PI (Pulsitility Index): 4 Power: 4.7  Test: Yes 
Back Up  at Bedside & Labeled: Yes Power Module Test: Yes Driveline Site Care: No 
Driveline Dressing: Clean, Dry, and Intact Outpatient: No 
MAP in Therapeutic Range (Outpatient): Yes Testing Alarms Reviewed: Yes 
Back up SC speed: 6000 Back up Low Speed Limit: 5600 Emergency Equipment with Patient?: Yes Emergency procedures reviewed?: No 
Drive line site inspected?: Yes Drive line intergrity inspected?: Yes Drive line dressing changed?: No 
 
PHYSICAL EXAM: 
Visit Vitals /90 (BP 1 Location: Right arm, BP Patient Position: At rest) Pulse 88 Temp 97.8 °F (36.6 °C) Resp 18 Ht 6' 2\" (1.88 m) Wt 164 lb 0.4 oz (74.4 kg) SpO2 99% BMI 21.06 kg/m² Review of Systems Constitutional: Negative for chills, fever, malaise/fatigue and weight loss. HENT: Positive for hearing loss and nosebleeds. Eyes: Negative. Respiratory: Positive for cough and hemoptysis. Negative for shortness of breath. Hemoptysis likely due to epistaxis Cardiovascular: Negative for chest pain, palpitations, orthopnea and leg swelling. Gastrointestinal: Negative. Genitourinary: Positive for hematuria. Negative for dysuria, flank pain and urgency. Musculoskeletal: Negative. Skin: Negative. Neurological: Positive for weakness. Endo/Heme/Allergies: Bruises/bleeds easily. Psychiatric/Behavioral: Positive for depression. Physical Exam 
Vitals signs and nursing note reviewed. Constitutional:   
   General: He is not in acute distress. Appearance: He is ill-appearing. Comments: Restless in bed, mumbling HENT:  
   Head: Normocephalic. Nose:  
   Right Nostril: Epistaxis present. Comments: Epistaxis 2/2 picking nose Mouth/Throat:  
   Mouth: Mucous membranes are moist.  
   Dentition: Has dentures. Neck:  
   Vascular: No JVD. Cardiovascular:  
   Rate and Rhythm: Normal rate and regular rhythm. Occasional extrasystoles are present. Heart sounds: Murmur present. Comments: +lvad hum Pulmonary:  
   Effort: Pulmonary effort is normal.  
   Breath sounds: Normal breath sounds. Abdominal:  
   General: Bowel sounds are normal.  
   Palpations: Abdomen is soft. Genitourinary: 
   Comments: Lizama catheter draining erica urine Musculoskeletal:  
   Right lower leg: Edema present. Left lower leg: Edema present. Skin: 
   General: Skin is warm and dry. Capillary Refill: Capillary refill takes 2 to 3 seconds. Findings: Bruising present. Comments: Scattered bruising on extremities Neurological:  
   Mental Status: He is alert. He is disoriented and confused. Motor: Weakness present. Comments: Restless Psychiatric:     
   Attention and Perception: He is inattentive. Speech: Speech normal.  
 
 
 
 
 
PAST MEDICAL HISTORY: 
Past Medical History:  
Diagnosis Date  BPH (benign prostatic hyperplasia)  CHF (congestive heart failure) ([address]  Degenerative disc disease, lumbar  Heart failure ([address]  High cholesterol  Hypertension  Ischemic cardiomyopathy  LVAD (left ventricular assist device) present ([address]  Paroxysmal atrial fibrillation ([address] 4/2/2019  Spinal stenosis PAST SURGICAL HISTORY: 
Past Surgical History:  
Procedure Laterality Date  COLONOSCOPY Left 11/11/2019 COLONOSCOPY at bedside performed by James Perez MD at [address]  HX CORONARY ARTERY BYPASS GRAFT    
 triple  HX HEART ASSIST DEVICE Left 10/29/2019 Impella 5.0  
 HX HEART ASSIST DEVICE Left 11/18/2019 HeartMate 3  
 HX HERNIA REPAIR    
 HX IMPLANTABLE CARDIOVERTER DEFIBRILLATOR  HX THROMBECTOMY Left 11/25/2019 Left brachial thrombectomy  [place] CARDIOVERSION ELECTIVE ARRHYTHMIA EXTERNAL N/A 6/10/2019 EP CARDIOVERSION performed by Julio Kendrick MD at [place] [address] [address] CATH LAB  [place] CARDIOVERSION ELECTIVE ARRHYTHMIA EXTERNAL N/A 6/18/2019 EP CARDIOVERSION performed by Bhavik Arvizu MD at [place] [address] [address] CATH LAB  NC INSJ/RPLCMT PERM DFB W/TRNSVNS LDS 1/DUAL CHMBR N/A 6/21/2019 INSERT ICD BIV MULTI performed by Ángel Manuel MD at [address] [place] [address], [facility] [address] CATH LAB  NC TCAT IMPL WRLS P-ART PRS SNR L-T HEMODYN MNTR N/A 9/18/2019 IMPLANT HEART FAILURE MONITORING DEVICE performed by Marisela Scanlon MD at [address] [place] [address], [facility] [address] CATH LAB FAMILY HISTORY: 
Family History Problem Relation Age of Onset  Lung Disease Mother  Hypertension Mother [address] Arthritis-osteo Mother  Heart Disease Mother  Heart Disease Father  Diabetes Father SOCIAL HISTORY: 
Social History Socioeconomic History  Marital status:  Spouse name: Not on file  Number of children: Not on file  Years of education: Not on file  Highest education level: Not on file Tobacco Use  Smoking status: Former Smoker Last attempt to quit: 2010 Years since quittin.6  Smokeless tobacco: Never Used Substance and Sexual Activity  Alcohol use: Not Currently Comment: rarely  Drug use: Never Other Topics Concern LABORATORY RESULTS: 
  
Labs Latest Ref Rng & Units 2020 WBC 4.1 - 11.1 K/uL 5.3 5.7 5.8 5.7 5.9 7.3 -  
RBC 4.10 - 5.70 M/uL 2.46(L) 2.50(L) 2.49(L) 2.57(L) 2.54(L) 2.60(L) - Hemoglobin 12.1 - 17.0 g/dL 7. 2(L) 7. 4(L) 7. 4(L) 7. 9(L) 7. 5(L) 7. 7(L) 7. 6(L) Hematocrit 36.6 - 50.3 % 23. 2(L) 23. 9(L) 23. 6(L) 24. 6(L) 24. 6(L) 25. 0(L) -  
MCV 80.0 - 99.0 FL 94.3 95.6 94.8 95.7 96.9 96.2 - Platelets 907 - 986 K/uL 151 157 157 179 169 170 - Lymphocytes 12 - 49 % - - - - - - - Monocytes 5 - 13 % - - - - - - - Eosinophils 0 - 7 % - - - - - - - Basophils 0 - 1 % - - - - - - - Albumin 3.5 - 5.0 g/dL 2. 9(L) 2. 8(L) 2. 7(L) 2. 7(L) 2. 6(L) 2. 8(L) -  
Calcium 8.5 - 10.1 MG/DL 8.7 8.9 8.6 8.5 8.6 8.6 - Glucose 65 - 100 mg/dL 96 96 94 91 94 100 -  
BUN 6 - 20 MG/DL 21(H) 23(H) 25(H) 22(H) 24(H) 21(H) -  
Creatinine 0.70 - 1.30 MG/DL 1.29 1.29 1.34(H) 1.31(H) 1.34(H) 1.30 - Sodium 136 - 145 mmol/L 134(L) 134(L) 134(L) 136 135(L) 136 - Potassium 3.5 - 5.1 mmol/L 4.0 4.0 4.2 4.4 4.4 4.1 -  
TSH 0.36 - 3.74 uIU/mL - - - - - - -  
PSA 0.01 - 4.0 ng/mL - - - - - - -  
LDH 85 - 241 U/L 207 209 165 191 184 167 - Some recent data might be hidden Lab Results Component Value Date/Time TSH 1.13 2020 11:28 AM  
 TSH 1.70 2020 01:59 PM  
 TSH 2.30 2019 03:29 AM  
 TSH 2.40 10/25/2019 07:39 PM  
 TSH 2.45 2019 04:16 AM  
 
 
ALLERGY: 
Allergies Allergen Reactions  Cefepime Other (comments)  
  myoclonus CURRENT MEDICATIONS: 
 
Current Facility-Administered Medications:  
  ALPRAZolam (XANAX) tablet 0.25 mg, 0.25 mg, Oral, TID PRN, Андрей Dai NP 
  sodium bicarbonate tablet 650 mg, 650 mg, Oral, BID, Omero Roy MD, 650 mg at 07/31/20 1807   sodium chloride (OCEAN) 0.65 % nasal squeeze bottle 2 Spray, 2 Spray, Both Nostrils, Q2H PRN, Андрей Dai NP, 2 Spray at 07/30/20 0003   oxymetazoline (AFRIN) 0.05 % nasal spray 2 Spray, 2 Spray, Both Nostrils, BID PRN, Андрей Dai NP, 2 Spray at 07/31/20 0619   hydrALAZINE (APRESOLINE) tablet 10 mg, 10 mg, Oral, TID, Андрей Dai NP, 10 mg at 07/31/20 2118 
  gentamicin (GARAMYCIN) 0.1 % cream, , Topical, BID Mon Wed & Fri, Андрей Dai NP 
  meropenem (MERREM) 500 mg in 0.9% sodium chloride (MBP/ADV) 50 mL, 500 mg, IntraVENous, Q6H, Geoff Godfrey MD, Last Rate: 100 mL/hr at 08/01/20 0130, 500 mg at 08/01/20 0130   hydrALAZINE (APRESOLINE) 20 mg/mL injection 10 mg, 10 mg, IntraVENous, Q4H PRN, Андрей Dai NP, 10 mg at 07/27/20 2036   epoetin yvonne-epbx (RETACRIT) injection 10,000 Units, 10,000 Units, SubCUTAneous, Q MON, WED & FRI, Mark Matthew MD, 10,000 Units at 07/31/20 2117 
  0.9% sodium chloride infusion 250 mL, 250 mL, IntraVENous, PRN, Shana Sims NP 
  calcium polycarbophiL (FIBERCON) tablet 625 mg, 1 Tab, Oral, DAILY, Андрей Dai NP, 625 mg at 07/31/20 6972   sodium chloride (NS) flush 5-40 mL, 5-40 mL, IntraVENous, Q8H, CARLOS [employer] M, DO, 10 mL at 08/01/20 0273   sodium chloride (NS) flush 5-40 mL, 5-40 mL, IntraVENous, PRN, Anselmo FRENCH DO 
  acetaminophen (TYLENOL) tablet 650 mg, 650 mg, Oral, Q4H PRN, Anselmo FRENCH DO, 650 mg at 07/27/20 0626   ondansetron (ZOFRAN) injection 4 mg, 4 mg, IntraVENous, Q4H PRN, Anselmo FRENCH DO, 4 mg at 07/22/20 6962   bisacodyL (DULCOLAX) tablet 5 mg, 5 mg, Oral, DAILY PRN, Damon [place], [employer] M, DO 
  digoxin (LANOXIN) tablet 0.125 mg, 0.125 mg, Oral, EVERY OTHER DAY, Hyun Zhao M, DO, 0.125 mg at 07/30/20 1026 
  finasteride (PROSCAR) tablet 5 mg, 5 mg, Oral, DAILY, Hyun Zhao M, DO, 5 mg at 07/31/20 1229 
  lactobac ac& pc-s.therm-b.anim (TIAN Q/RISAQUAD), 1 Cap, Oral, DAILY, Madhuri Zhao, DO, 1 Cap at 07/31/20 9839   levETIRAcetam (KEPPRA) tablet 250 mg, 250 mg, Oral, BID, Opal Warner M, DO, 250 mg at 07/31/20 1807   tamsulosin (FLOMAX) capsule 0.8 mg, 0.8 mg, Oral, QHS, Hyun Zhao, DO, 0.8 mg at 07/31/20 2117 
  venlafaxine-SR (EFFEXOR-XR) capsule 150 mg, 150 mg, Oral, DAILY WITH BREAKFAST, Hyun Zhao M, DO, 150 mg at 07/31/20 0618 Thank you for allowing me to participate in this patient's care. TIERRA Garner [address], [address] Phone: [phone] Fax: [phone]

## 2020-08-01 NOTE — PROGRESS NOTES
Problem: Falls - Risk of 
Goal: *Absence of Falls Description: Document Delfino Barroso Fall Risk and appropriate interventions in the flowsheet. Outcome: Progressing Towards Goal 
Note: Fall Risk Interventions: 
Mobility Interventions: Communicate number of staff needed for ambulation/transfer, Patient to call before getting OOB, Strengthening exercises (ROM-active/passive), Utilize gait belt for transfers/ambulation, OT consult for ADLs, PT Consult for mobility concerns Mentation Interventions: Adequate sleep, hydration, pain control, Door open when patient unattended, More frequent rounding, Reorient patient, Room close to nurse's station, Toileting rounds, Update white board Medication Interventions: Evaluate medications/consider consulting pharmacy, Patient to call before getting OOB, Teach patient to arise slowly, Utilize gait belt for transfers/ambulation Elimination Interventions: Call light in reach, Patient to call for help with toileting needs, Stay With Me (per policy), Toilet paper/wipes in reach, Toileting schedule/hourly rounds

## 2020-08-01 NOTE — PROGRESS NOTES
[facility] VAD/Heart Failure Progress Note Admit Date: 2020 POD:  9 Days Post-Op Procedure:  Procedure(s): 
CYSTO RETROGRADE PYELOGRAM/  BLADDER BIOPSY/ TURBT Subjective:  
Mild dyspnea, fatigue and weakness; confused at times Objective:  
Vitals: 
Blood pressure 110/90, pulse 84, temperature 98 °F (36.7 °C), resp. rate 16, height 6' 2\" (1.88 m), weight 164 lb 0.4 oz (74.4 kg), SpO2 98 %. Temp (24hrs), Av.1 °F (36.7 °C), Min:97.8 °F (36.6 °C), Max:98.5 °F (36.9 °C) Hemodynamics: 
 CO:   
 CI:   
 CVP:   
 SVR:   
 PAP Systolic:   
 PAP Diastolic:   
 PVR:   
 QY47:   
 SCV02:   
 
VAD Interrogation: LVAD (Heartmate) Pump Speed (RPM): 6000 Pump Flow (LPM): 5 Chatter in Lines: No 
PI (Pulsitility Index): 3.5 Power: 4.7 MAP: 84  Test: Yes 
Back Up  at Bedside & Labeled: Yes Power Module Test: Yes Driveline Site Care: No 
Driveline Dressing: Clean, Dry, and Intact EKG/Rhythm:   
 
Extubation Date / Time:  
 
CT Output:  
 
Ventilator: 
  
 
Oxygen Therapy: 
Oxygen Therapy O2 Sat (%): 98 % (20) Pulse via Oximetry: 80 beats per minute (20 0520) O2 Device: Room air (20) O2 Flow Rate (L/min): 2 l/min (20) CXR: 
 
Admission Weight: Last Weight Weight: 175 lb 0.7 oz (79.4 kg) Weight: 164 lb 0.4 oz (74.4 kg) Intake / Kam Schroeder / Nisha Winslowr: 
Current Shift:  07 -  1900 In: 680 [P.O.:480; I.V.:200] Out: 1250 [TDMIJ:2146] Last 24 hrs.:  
 
Intake/Output Summary (Last 24 hours) at 2020 1748 Last data filed at 2020 1539 Gross per 24 hour Intake 800 ml Output 1250 ml Net -450 ml No results for input(s): CPK, CKMB, TROIQ in the last 72 hours. Recent Labs 20 
6527 20 
4320 206 20 
0315 * 134* 134* 134* K 4.2 4.0 4.0 4.2 CO2 21 21 19* 22 BUN 29* 21* 23* 25* CREA 1.22 1.29 1.29 1.34* GLU 98 96 96 94 PHOS 3.1  --   --   --   
MG  --  2.0 2.2 2.3 WBC  --  5.3 5.7 5.8 HGB  --  7.2* 7.4* 7.4* HCT  --  23.2* 23.9* 23.6* PLT  --  151 157 157 Recent Labs 08/01/20 
5396 INR 1.2* PTP 12.4* No lab exists for component: PBNP Current Facility-Administered Medications:  
  ALPRAZolam (XANAX) tablet 0.25 mg, 0.25 mg, Oral, TID PRN, Iris Mercedes NP, 0.25 mg at 08/01/20 9223   sodium chloride (OCEAN) 0.65 % nasal squeeze bottle 2 Spray, 2 Spray, Both Nostrils, TID, Laura Castano MD, 2 Spray at 08/01/20 3497   sodium bicarbonate tablet 650 mg, 650 mg, Oral, BID, Logan Kincaid MD, 650 mg at 08/01/20 1710 
  oxymetazoline (AFRIN) 0.05 % nasal spray 2 Spray, 2 Spray, Both Nostrils, BID PRN, Iris Mercedes NP, 2 Spray at 07/31/20 5935   hydrALAZINE (APRESOLINE) tablet 10 mg, 10 mg, Oral, TID, Iris Mercedes NP, 10 mg at 08/01/20 1535 
  gentamicin (GARAMYCIN) 0.1 % cream, , Topical, BID Mon Wed & Fri, Iris Mercedes NP 
  meropenem (MERREM) 500 mg in 0.9% sodium chloride (MBP/ADV) 50 mL, 500 mg, IntraVENous, Q6H, Geoff Godfrey MD, Last Rate: 100 mL/hr at 08/01/20 1346, 500 mg at 08/01/20 1346   hydrALAZINE (APRESOLINE) 20 mg/mL injection 10 mg, 10 mg, IntraVENous, Q4H PRN, Iris Mercedes NP, 10 mg at 07/27/20 2036   epoetin yvonne-epbx (RETACRIT) injection 10,000 Units, 10,000 Units, SubCUTAneous, Q MON, WED & FRI, Kodak Lee MD, 10,000 Units at 07/31/20 2117 
  0.9% sodium chloride infusion 250 mL, 250 mL, IntraVENous, PRN, Shana Sims, NP 
  calcium polycarbophiL (FIBERCON) tablet 625 mg, 1 Tab, Oral, DAILY, Iris Mercedes NP, 625 mg at 08/01/20 5773   sodium chloride (NS) flush 5-40 mL, 5-40 mL, IntraVENous, Q8H, CARLOS Zhao M, DO, 10 mL at 08/01/20 1345   sodium chloride (NS) flush 5-40 mL, 5-40 mL, IntraVENous, PRN, aMdhuri Ash, DO 
   acetaminophen (TYLENOL) tablet 650 mg, 650 mg, Oral, Q4H PRN, Anselmo FRENCH, DO, 650 mg at 07/27/20 2322   ondansetron (ZOFRAN) injection 4 mg, 4 mg, IntraVENous, Q4H PRN, Anselmo FRENCH, DO, 4 mg at 07/22/20 8841   bisacodyL (DULCOLAX) tablet 5 mg, 5 mg, Oral, DAILY PRN, Elizabeth Lombardi DO 
  digoxin (LANOXIN) tablet 0.125 mg, 0.125 mg, Oral, EVERY OTHER DAY, Anselmo FRENCH, DO, 0.125 mg at 08/01/20 0944 
  finasteride (PROSCAR) tablet 5 mg, 5 mg, Oral, DAILY, Hyun Zhao, DO, 5 mg at 08/01/20 0944 
  lactobac ac& pc-s.therm-b.anim (TIAN Lucía Linker), 1 Cap, Oral, DAILY, Jean Carlos Goyal, DO, 1 Cap at 08/01/20 4586   levETIRAcetam (KEPPRA) tablet 250 mg, 250 mg, Oral, BID, Anselmo FRENCH DO, 250 mg at 08/01/20 1710   tamsulosin (FLOMAX) capsule 0.8 mg, 0.8 mg, Oral, QHS, Hyun Zhao, DO, 0.8 mg at 07/31/20 2117 
  venlafaxine-SR (EFFEXOR-XR) capsule 150 mg, 150 mg, Oral, DAILY WITH BREAKFAST, Hyun Zhao, DO, 150 mg at 08/01/20 2997 A/P 
S/P LVAD - good flows Need for anticoagulation - on hodl due to recurrent bleeds UTI - abx's Confusion - monitor 
  
Risk of morbidity and mortality - high Medical decision making - high complexity 
  
 
 
Signed By: Chris Gunter MD

## 2020-08-02 PROBLEM — I50.84 END STAGE HEART FAILURE (HCC): Status: ACTIVE | Noted: 2020-01-01

## 2020-08-02 NOTE — PROGRESS NOTES
0730:  Bedside shift change report given to Valarie Crystal RN (oncoming nurse) by Li Graham RN (offgoing nurse). Report included the following information SBAR, Kardex, Procedure Summary, Intake/Output, MAR and Recent Results. 0800:  Patient's wife and son at bedside. 7034: One time dose of ativan given. 0848:  PRN xanax given. 0930:  Hospice at bedside. 1035:  [employer] paged. 1102:  Hospice medications initiated. Patient resting comfortably. 1242:  Garcia at bedside to turn off defrib.  1600:  Patient resting comfortably. 1729:  Patient left for the night, will return approx. 0700 tomorrow. 1930:  Bedside shift change report given to Claudette Valero RN (oncoming nurse) by Valarie Crystal RN (offgoing nurse). Report included the following information SBAR, Kardex, Procedure Summary, Intake/Output, MAR and Recent Results.

## 2020-08-02 NOTE — PROGRESS NOTES
Day #10 of Meropenem - Renal Dosing Update Indication:  Pseudomonas bacteremia Current regimen:  500 mg IV Q 6 hr Abx regimen: Monotherapy Recent Labs 20 
7586 20 
2569 20 
5863 20 
1459 WBC 5.8  --  5.3 5.7 CREA 1.58* 1.22 1.29 1.29  
BUN 51* 29* 21* 23* Est CrCl: ~40-50 ml/min; UO: ~0.9 ml/kg/hr Temp (24hrs), Av.4 °F (36.9 °C), Min:98 °F (36.7 °C), Max:99 °F (37.2 °C) Cultures:  
 Blood: P.aeruginosa in 2/4 bottles, final 
 Blood: P.aeruginosa in 1/4 bottles, final 
 Blood: NG, final 
 Blood: NGTD Plan: Change to 500 mg IV Q 8 hr per [facility] P&T Committee Protocol with respect to renal function. Pharmacy will continue to monitor patient daily and will make dosage adjustments based upon changing renal function.

## 2020-08-02 NOTE — PROGRESS NOTES
[facility] [address] in [place], [place] Inpatient Progress Note Patient name: Vikki Rodríguez Patient : 1950 Patient MRN: [id number] Attending MD: No att. providers found Date of service: 20 REASON FOR CONSULT: 
UTI in patient with LVAD PROCEDURE PERFORMED:  Cystoscopy, transurethral resection of large bladder tumor.  
POD 7 
 
24hr Events: 
Confused and restless requiring sitter/RN at bedside S/p cauterization of nares with packing due to epistaxis Coughed up packing from nares Frequent PI events MAPs 88-96mmHg PLAN: 
Family at bedside requesting comfort care only Consult Palliative Care for hospice Consult to Hospice Care for comfort measures Call [facility] to Deactivate ICD LVAD device high speed at 6000rpm and low speed at 5600rpm 
Plan to turn off LVAD tomorrow morning DNR/DDNR 
D/C to hospice care Dispo: Family is requesting comfort measures only at this time. Octavio Mercado is a DNR/DDNR and will be admitted to inpatient hospice. Plan to turn off LVAD tomorrow morning to allow more comfortable measures at this time.                                                                                                                                                
   
  
IMPRESSION: 
Bladder Carcinoma Gross Hematuria UTI Metabolic encephalopathy Chronic systolic heart failure Stage D, NYHA class II symptoms Coronary artery disease Ischemic cardiomyopathy, LVEF 15% S/p HM3 LVAD as destination therapy (19 by Dr. Rivera Members) S/p Impella 5.0 as bridge to LVAD 
HTN, goal MAP 70-90mmHg H/o VT s/p [facility] BIV-ICD PAF 
H/o recurrent UTI, pseudomonal infection Unable to tolerate AC due to hematuria MSSA drive line infection COPD 
JOSE Essential tremor on keppra Depression on effexor Persistently elevated CEA PET scan increased RML activity, not malignant per hemonc Orthostatic hypotension DNR 
 Up-to-date with PNA vaccine - per PCP/wife 
2/4bottles blood cultures +pseudomonas (7/24/20) diarrhea Epistaxis Confusion Papillary urothelial carcinoma, high-grade, with lamina propria invasion 
cystoscopy and TURBT (7/23/20) -  resection of large papillary and solid bladder tumor on posterior wall CARDIAC IMAGING: 
TTE 4/20 shows large LVIDd, 6.84 cm, RVIDd 3.06 cm, TAPSE 1.15 cm, severely elevated CVP 
TTE 7/6/20- Mild AI, LViDd 6.91cm, RVIDd 5.16cm, TAPSE 1.39cm Echo (7/18/20) · Contrast used: DEFINITY. · Image quality for this study was technically difficult. · Severely dilated left ventricle. Wall thickness appears thin. Severe systolic function. Estimated left ventricular ejection fraction is <15%. Visually measured ejection fraction. · Moderately dilated left atrium. · Moderately dilated right ventricle. Moderately to severely reduced systolic function. · Dilated right atrium. · Moderate mitral valve prolapse. 
  
 
LVAD INTERROGATION: 
Device interrogated in person, Increased Speed to 6000rpm, low speed to 5600rpm 
Device function normal, normal flow, multiple PI events LVAD Pump Speed (RPM): 6000 Pump Flow (LPM): 4.8 MAP: 84 
PI (Pulsitility Index): 4.1 Power: 4.6  Test: No 
Back Up  at Bedside & Labeled: Yes Power Module Test: No 
Driveline Site Care: No 
Driveline Dressing: Clean, Dry, and Intact Outpatient: No 
MAP in Therapeutic Range (Outpatient): Yes Testing Alarms Reviewed: Yes 
Back up SC speed: 6000 Back up Low Speed Limit: 5600 Emergency Equipment with Patient?: Yes Emergency procedures reviewed?: No 
Drive line site inspected?: Yes Drive line intergrity inspected?: Yes Drive line dressing changed?: No 
 
PHYSICAL EXAM: 
Visit Vitals /90 (BP 1 Location: Right arm, BP Patient Position: At rest) Pulse 100 Temp 99 °F (37.2 °C) Resp 20 Ht 6' 2\" (1.88 m) Wt 163 lb 3.2 oz (74 kg) SpO2 100% BMI 20.95 kg/m² Review of Systems Unable to perform ROS: Medical condition Physical Exam 
Vitals signs and nursing note reviewed. Constitutional:   
   General: He is not in acute distress. Appearance: He is ill-appearing. Comments: Restless in bed, mumbling HENT:  
   Head: Normocephalic. Nose:  
   Right Nostril: Epistaxis present. Comments: Epistaxis 2/2 picking nose Mouth/Throat:  
   Mouth: Mucous membranes are moist.  
   Dentition: Has dentures. Neck:  
   Vascular: No JVD. Cardiovascular:  
   Rate and Rhythm: Normal rate and regular rhythm. Occasional extrasystoles are present. Heart sounds: Murmur present. Comments: +lvad hum Pulmonary:  
   Effort: Pulmonary effort is normal.  
   Breath sounds: Normal breath sounds. Abdominal:  
   General: Bowel sounds are normal.  
   Palpations: Abdomen is soft. Genitourinary: 
   Comments: Lizama catheter draining erica urine Musculoskeletal:  
   Right lower leg: Edema present. Left lower leg: Edema present. Skin: 
   General: Skin is warm and dry. Capillary Refill: Capillary refill takes 2 to 3 seconds. Findings: Bruising present. Comments: Scattered bruising on extremities Neurological:  
   Mental Status: He is alert. He is disoriented and confused. Motor: Weakness present. Comments: Restless Psychiatric:     
   Attention and Perception: He is inattentive. Speech: Speech normal.  
 
 
 
 
 
PAST MEDICAL HISTORY: 
Past Medical History:  
Diagnosis Date  BPH (benign prostatic hyperplasia)  CHF (congestive heart failure) ([address]  Degenerative disc disease, lumbar  Heart failure ([address]  High cholesterol  Hypertension  Ischemic cardiomyopathy  LVAD (left ventricular assist device) present ([address]  Paroxysmal atrial fibrillation ([address] 4/2/2019  Spinal stenosis PAST SURGICAL HISTORY: 
Past Surgical History:  
Procedure Laterality Date  COLONOSCOPY Left 2019 COLONOSCOPY at bedside performed by Conrad Menjivar MD at [address]  HX CORONARY ARTERY BYPASS GRAFT    
 triple  HX HEART ASSIST DEVICE Left 10/29/2019 Impella 5.0  
 HX HEART ASSIST DEVICE Left 2019 HeartMate 3  
 HX HERNIA REPAIR    
 HX IMPLANTABLE CARDIOVERTER DEFIBRILLATOR  HX THROMBECTOMY Left 2019 Left brachial thrombectomy  MS CARDIOVERSION ELECTIVE ARRHYTHMIA EXTERNAL N/A 6/10/2019 EP CARDIOVERSION performed by Carolina David MD at [address] CATH LAB  MS CARDIOVERSION ELECTIVE ARRHYTHMIA EXTERNAL N/A 2019 EP CARDIOVERSION performed by Anup Smith MD at [address] CATH LAB  MS INSJ/RPLCMT PERM DFB W/TRNSVNS LDS 1/DUAL CHMBR N/A 2019 INSERT ICD BIV MULTI performed by Belkis Gee MD at [address] CATH LAB  MS TCAT IMPL WRLS P-ART PRS SNR L-T HEMODYN MNTR N/A 2019 IMPLANT HEART FAILURE MONITORING DEVICE performed by Eric Mills MD at [address] CATH LAB FAMILY HISTORY: 
Family History Problem Relation Age of Onset  Lung Disease Mother  Hypertension Mother Sondra Cone Arthritis-osteo Mother  Heart Disease Mother  Heart Disease Father  Diabetes Father SOCIAL HISTORY: 
Social History Socioeconomic History  Marital status:  Spouse name: Not on file  Number of children: Not on file  Years of education: Not on file  Highest education level: Not on file Tobacco Use  Smoking status: Former Smoker Last attempt to quit: 2010 Years since quittin.6  Smokeless tobacco: Never Used Substance and Sexual Activity  Alcohol use: Not Currently Comment: rarely  Drug use: Never Other Topics Concern LABORATORY RESULTS: 
  
Labs Latest Ref Rng & Units 2020 WBC 4.1 - 11.1 K/uL 5.8 - 5.3 5.7 5.8 5.7 5.9 RBC 4.10 - 5.70 M/uL 1.96(L) - 2.46(L) 2.50(L) 2.49(L) 2.57(L) 2.54(L) Hemoglobin 12.1 - 17.0 g/dL 5. 7(LL) - 7. 2(L) 7. 4(L) 7. 4(L) 7. 9(L) 7. 5(L) Hematocrit 36.6 - 50.3 % 18. 8(L) - 23. 2(L) 23. 9(L) 23. 6(L) 24. 6(L) 24. 6(L) MCV 80.0 - 99.0 FL 95.9 - 94.3 95.6 94.8 95.7 96.9 Platelets 936 - 887 K/uL 154 - 151 157 157 179 169 Lymphocytes 12 - 49 % - - - - - - - Monocytes 5 - 13 % - - - - - - - Eosinophils 0 - 7 % - - - - - - - Basophils 0 - 1 % - - - - - - - Albumin 3.5 - 5.0 g/dL 2. 8(L) 3.0(L) 2. 9(L) 2. 8(L) 2. 7(L) 2. 7(L) 2. 6(L) Calcium 8.5 - 10.1 MG/DL 8.4(L) 8.9 8.7 8.9 8.6 8.5 8.6 Glucose 65 - 100 mg/dL 100 98 96 96 94 91 94 BUN 6 - 20 MG/DL 51(H) 29(H) 21(H) 23(H) 25(H) 22(H) 24(H) Creatinine 0.70 - 1.30 MG/DL 1.58(H) 1.22 1.29 1.29 1.34(H) 1.31(H) 1.34(H) Sodium 136 - 145 mmol/L 136 135(L) 134(L) 134(L) 134(L) 136 135(L) Potassium 3.5 - 5.1 mmol/L 4.3 4.2 4.0 4.0 4.2 4.4 4.4 TSH 0.36 - 3.74 uIU/mL - - - - - - -  
PSA 0.01 - 4.0 ng/mL - - - - - - -  
LDH 85 - 241 U/L 213 - 207 209 165 191 184 Some recent data might be hidden Lab Results Component Value Date/Time TSH 1.13 07/18/2020 11:28 AM  
 TSH 1.70 04/21/2020 01:59 PM  
 TSH 2.30 12/03/2019 03:29 AM  
 TSH 2.40 10/25/2019 07:39 PM  
 TSH 2.45 06/01/2019 04:16 AM  
 
 
ALLERGY: 
Allergies Allergen Reactions  Cefepime Other (comments)  
  myoclonus CURRENT MEDICATIONS: 
No current facility-administered medications for this encounter. No current outpatient medications on file.  
 
Facility-Administered Medications Ordered in Other Encounters:  
  LORazepam (ATIVAN) injection 1 mg, 1 mg, IntraVENous, Q1H PRN, Brittni Marquez MD, 1 mg at 08/02/20 1102 
  haloperidol lactate (HALDOL) injection 2 mg, 2 mg, IntraVENous, Q6H PRN, Brittni Marquez MD 
  glycopyrrolate (ROBINUL) injection 0.2 mg, 0.2 mg, IntraVENous, Q4H PRN, Brittni Marquez MD 
   acetaminophen (TYLENOL) suppository 650 mg, 650 mg, Rectal, Q4H PRN, Amanda Jamison MD 
  bisacodyL (DULCOLAX) suppository 10 mg, 10 mg, Rectal, DAILY PRN, Carolynn Wood MD 
  HYDROmorphone (PF) (DILAUDID) injection 1 mg, 1 mg, IntraVENous, Q15MIN PRN, Amanda Jamison MD 
 
Thank you for allowing me to participate in this patient's care. TIERRA Fonseca [facility] [address], [address] Phone: [phone] Fax: [phone]

## 2020-08-02 NOTE — HOSPICE
[facility] Good Help to Those in Need 
[phone] Patient Name: Rosalina Muñiz YOB: 1950 Age: 71 y.o. [facility] Liaison Note:  
 
Consult received, reviewing chart. Will come to see patient and family shortly.

## 2020-08-02 NOTE — PROGRESS NOTES
[facility] VAD/Heart Failure Progress Note    Admit Date: 2020  POD:  * No surgery found *    Procedure:          Subjective:   Dyspnea, fatigue, and weakness; confusion persists    Objective:   Vitals:  Pulse (!) 110, resp. rate 16. Temp (24hrs), Av.6 °F (37 °C), Min:98.2 °F (36.8 °C), Max:99 °F (37.2 °C)    Hemodynamics:   CO:     CI:     CVP:     SVR:     PAP Systolic:     PAP Diastolic:     PVR:     OI70:     SCV02:      VAD Interrogation: LVAD (Heartmate)  Pump Speed (RPM): 6000  Pump Flow (LPM): 4.8  PI (Pulsitility Index): 3.8  Power: 4.1   Test: No  Back Up  at Bedside & Labeled: Yes  Power Module Test: No  Driveline Site Care: No  Driveline Dressing: Clean, Dry, and Intact    EKG/Rhythm:      Extubation Date / Time:     CT Output:     Ventilator:       Oxygen Therapy:  Oxygen Therapy  O2 Device: Room air (20 1200)    CXR:    Admission Weight: Last Weight           Intake / Output / Drain:  Current Shift:  0701 -  1900  In: 0   Out: 250 [Urine:250]  Last 24 hrs.:     Intake/Output Summary (Last 24 hours) at 2020 1606  Last data filed at 2020 1200  Gross per 24 hour   Intake 0 ml   Output 250 ml   Net -250 ml             No results for input(s): CPK, CKMB, TROIQ in the last 72 hours.   Recent Labs     20  0509 20  0956 20  0607 20  0434    135* 134* 134*   K 4.3 4.2 4.0 4.0   CO2 19* 21 21 19*   BUN 51* 29* 21* 23*   CREA 1.58* 1.22 1.29 1.29    98 96 96   PHOS  --  3.1  --   --    MG 2.0  --  2.0 2.2   WBC 5.8  --  5.3 5.7   HGB 5.7*  --  7.2* 7.4*   HCT 18.8*  --  23.2* 23.9*     --  151 157     Recent Labs     20  0956   INR 1.2*   PTP 12.4*     No lab exists for component: PBNP        Current Facility-Administered Medications:     haloperidol lactate (HALDOL) injection 2 mg, 2 mg, IntraVENous, Q6H PRN, Danica Rubi MD    acetaminophen (TYLENOL) suppository 650 mg, 650 mg, Rectal, Q4H PRN, Kate Munguia MD    bisacodyL (DULCOLAX) suppository 10 mg, 10 mg, Rectal, DAILY PRN, Saloni Nicole MD    HYDROmorphone (PF) (DILAUDID) injection 1 mg, 1 mg, IntraVENous, Q15MIN PRN, Kate Munguia MD    furosemide (LASIX) injection 40 mg, 40 mg, IntraVENous, BID PRN, Kate Munugia MD    LORazepam (ATIVAN) injection 1 mg, 1 mg, IntraVENous, Q15MIN PRN, Kate Munugia MD    glycopyrrolate (ROBINUL) injection 0.2 mg, 0.2 mg, IntraVENous, Q4H, Kate Munguia MD, 0.2 mg at 08/02/20 1518    LORazepam (ATIVAN) injection 4 mg, 4 mg, IntraVENous, Q5MIN PRN, Kate Munguia MD    A/P  S/P LVAD - good flows  Need for anticoagulation - on hodl due to recurrent bleeds  UTI - abx's  Confusion - monitor     Risk of morbidity and mortality - high  Medical decision making - high complexity    Signed By: Dl Henao MD

## 2020-08-02 NOTE — PROGRESS NOTES
Cm reviewed chart and noted hospice consult. Family prefers to turn off the LVAD and transition to comfort care in house. [facility] had already been initiated, so this cm sent [facility] [employer] [facility]  
GILMER Juarez, EFREM

## 2020-08-02 NOTE — DISCHARGE SUMMARY
[facility] Discharge Summary Patient ID: 
Marcos Merlos [id number] 
71 y.o. 
1950 Admit date: 7/17/2020 Discharge date: 8/2/2020 Admitting Physician: Jose F Steel MD  
 
Referring Cardiologist:   
 
PCP:  Arnaud Moreira MD 
 
Admitting Diagnoses: Sepsis Discharge Diagnoses: Bladder Cancer, Pseudomonas Bacteremia Hospital Problems  Date Reviewed: 7/23/2020 Codes Class Noted POA Acute encephalopathy ICD-10-CM: G93.40 ICD-9-CM: 348.30  7/17/2020 Unknown Discharged Condition: terminal 
 
Disposition: Inpatient Hospice Procedures for this admission:  Procedure(s): 
CYSTO RETROGRADE PYELOGRAM/  BLADDER BIOPSY/ TURBT Discharge Medications: My Medications ASK your doctor about these medications Instructions Each Dose to Equal Morning Noon Evening Bedtime  
albuterol 90 mcg/actuation inhaler Commonly known as:  PROVENTIL HFA, VENTOLIN HFA, PROAIR HFA Your last dose was: Your next dose is: Take 2 Puffs by inhalation every six (6) hours as needed for Wheezing. 2 Puff 
  
  
  
  
  
budesonide 0.5 mg/2 mL Nbsp Commonly known as:  PULMICORT Your last dose was: Your next dose is:   
 
  
 500 mcg by Nebulization route nightly. 500 mcg 
  
  
  
  
  
ciprofloxacin HCl 750 mg tablet Commonly known as:  CIPRO Your last dose was: Your next dose is: Take 1 Tab by mouth two (2) times a day. 750 mg 
  
  
  
  
  
digoxin 0.125 mg tablet Commonly known as:  LANOXIN Your last dose was: Your next dose is: Take 1 Tab by mouth every other day. 0.125 mg 
  
  
  
  
  
finasteride 5 mg tablet Commonly known as:  PROSCAR Your last dose was: Your next dose is: Take 1 Tab by mouth daily. 5 mg Flomax 0.4 mg capsule Generic drug:  tamsulosin Your last dose was: Your next dose is: Take 0.8 mg by mouth nightly.  
 0.8 mg 
  
  
  
  
  
 gentamicin 0.1 % topical ointment Commonly known as:  GARAMYCIN Your last dose was: Your next dose is:   
 
  
 Apply to exit site daily. L. acidoph & paracasei- S therm- Bifido 8 billion cell Cap cap Commonly known as:  TIAN-Q/RISAQUAD Your last dose was: Your next dose is: Take 1 Cap by mouth daily. 1 Cap 
  
  
  
  
  
levETIRAcetam 250 mg tablet Commonly known as:  KEPPRA Your last dose was: Your next dose is: Take 1 Tab by mouth two (2) times a day. 250 mg 
  
  
  
  
  
magnesium oxide 400 mg tablet Commonly known as:  MAG-OX Your last dose was: Your next dose is: Take 2 Tabs by mouth two (2) times a day. 800 mg 
  
  
  
  
  
tadalafiL 2.5 mg tablet Commonly known as:  CIALIS Your last dose was: Your next dose is: Take 2.5 mg by mouth nightly. 2.5 mg 
  
  
  
  
  
venlafaxine- mg capsule Commonly known as:  EFFEXOR-XR Your last dose was: Your next dose is: Take 1 Cap by mouth daily (with breakfast). 150 mg 
  
  
  
  
  
  
 
 
 
HPI: Geeta Souza is a 71year old male with a history of ICM s/p LVAD as DT who was admitted with Pseudomonas bacteremia. His blood cultures have not cleared despite appropriate antibiotics, which indicates that his LVAD pump is seeded. He is not a candidate for LVAD replacement due to multiple co-morbidities. Dolphus will require lifelong IV antibiotics as an outpatient. He developed gross hematuria. He had a diagnostic cytoscopy with TURBT on 7/23/20. Urinary cytology revealed carcinoma, and with the high grade bladder tumor will need to repeat cytoscopy in 3 weeks. Additionally he developed epistaxis requiring ENT to cautarize bilateral nares with packing. He has grown increasingly confused. He does not have capacity and his family has requested hospice service with comfort care and to turn off the LVAD in 24 hours. Discharge Vital Signs:  
Visit Vitals /90 (BP 1 Location: Right arm, BP Patient Position: At rest) Pulse 100 Temp 99 °F (37.2 °C) Resp 20 Ht 6' 2\" (1.88 m) Wt 163 lb 3.2 oz (74 kg) SpO2 100% BMI 20.95 kg/m² Labs:  
Recent Labs 08/02/20 
6054 08/01/20 
2844 WBC 5.8  --   
HGB 5.7*  --   
HCT 18.8*  --   
  --   
 135* K 4.3 4.2 BUN 51* 29* CREA 1.58* 1.22  
 98 INR  --  1.2* Diagnostics: see chart Patient Instructions/Follow Up Care: 
Questions were also answered at this time. The patient and family were encouraged to call hospice with any questions or concerns.   
 
 
Signed: 
Delmy Stover NP 
8/2/2020 
3:58 PM

## 2020-08-02 NOTE — PROGRESS NOTES
Problem: Pressure Injury - Risk of  Goal: *Prevention of pressure injury  Description: Document Mich Scale and appropriate interventions in the flowsheet. Outcome: Progressing Towards Goal  Note: Pressure Injury Interventions:  Sensory Interventions: Assess changes in LOC, Float heels, Minimize linen layers, Assess need for specialty bed, Check visual cues for pain    Moisture Interventions: Absorbent underpads    Activity Interventions: Assess need for specialty bed    Mobility Interventions: Assess need for specialty bed    Nutrition Interventions: Document food/fluid/supplement intake    Friction and Shear Interventions: Feet elevated on foot rest, Apply protective barrier, creams and emollients                Problem: Falls - Risk of  Goal: *Absence of Falls  Description: Document Rosio Fall Risk and appropriate interventions in the flowsheet.   Outcome: Progressing Towards Goal  Note: Fall Risk Interventions:  Mobility Interventions: Bed/chair exit alarm    Mentation Interventions: Bed/chair exit alarm, Door open when patient unattended, Familiar objects from home, Family/sitter at bedside, Room close to nurse's station    Medication Interventions: Bed/chair exit alarm    Elimination Interventions: Bed/chair exit alarm              Problem: Dyspnea Due to End of Life  Goal: Demonstrate understanding of and ability to manage respiratory symptoms at end of life  Outcome: Progressing Towards Goal

## 2020-08-02 NOTE — H&P
[facility]  [phone]    Patient Name: Khoa Portillo  YOB: 1950    Date of Provider Hospice Visit: 08/02/20    Level of Care:   [x] General Inpatient (GIP)    [] Routine   [] Respite    Current Location of Care:  [x] [facility] [] [facility] [] [address] [address] [facility] [] [address] [] Hospice House THE [facility], patient referred from:  [] [facility] [] [facility] [] [address] [address] [facility] [] [address] [] Home [] Other:     Date of Original Hospice Admission: 8/2/20  Hospice Medical Director at time of Janie Durham MD     Principle Hospice Diagnosis: sepsis  Diagnoses RELATED to the terminal prognosis: heart failure s/p LVAD  Other Diagnoses: CKD, Bladder cancer , recurrent sepsis . Darya Curtis is a 71y.o. year old male with hx of heart failure s/p LVAD in Nov 2019, recurrent pseudomonas bacteremia, bladder cancer , CKD who was admitted to [facility] on 7/17/20 for confusion and Pseudomonas bacteremia, secondary to driveline infection, ID suggested long term I/V antibiotics via I/V route, in addition he had hematuria, underwent cystoscopy with biopsy showing bladder cancer . Patient noted to have on going confusion , family was considering home hospice. Over the course of last night patient became restless, agitated his hb dropped , family decided to withdraw active treatment inclding AICD and stopping LVAD support . who was admitted to [address] [place] [address]. The patient's principle diagnosis has resulted in : HB of 5.7, CREATININE OF 1.58, BNP 6.956    Functionally, the patient's Karnofsky and/or Palliative Performance Scale has declined over a period of 48 hrs and is estimated at 10. The patient is dependent on the following ADLs:  The patient/family chose comfort measures with the support of Hospice. HOSPICE DIAGNOSES   Active Symptoms:  1. Agitation  2. Restlessness  3. Sepsis  4. Pain   5. Unresponsiveness         PLAN   1.  Patient requiring I/V medication round the clock to manage uncontrolled symptoms. 2. Agitation : patient was restless and thrashing per family at bed side , now resting after ativan   1. Place on ativan 1 mg  I/V every 15 mins. 2. Seizures : off keppra , will start on seizure dose of Ativan 4 mg every 5 mins I/V x 3 doses for seizure activity , to call MD .  3. Will place him on lasix 40 mg I/V bid prn .  4. Secreation : robinul I/V 0.2 mg I/V every 6 hrs schedule. 5.  use gentle oral suction if needed , avoid deep suction disturbing to patient , position the patient on their side. 6. Deactivation of AICD: contacted [facility] representative who will be coming shortly to deactivate the AICD. 7. Withdrawal of AICD: I spoke to advance heart failure Nurse Practitioner Cary Cavazos in person on the floor, she suggested to continue with comfort care , continue with LVAD support, for next 24 hrs for heart failure team to stop the LVAD tomorrow. 8. Psychosocial support : Met with hIS wife and son Rhonda Meyer at bed side , explain medication plan and plan to deactivate the AICD and stopping the LVAD tomorrow, they are very appreciative of care , family is extremely grateful for the care provided to him . 3. Spritual care: family denies any spritual care needs. 4. Disposition: will pass away in the hospital .  5. Hospice Plan of care was reviewed in detail and agree with current plan of care    Prognosis estimated based on 08/02/20 clinical assessment is:   [x] Hours to Days    [] Days to Weeks    [] Other:    Communicated plan of care with: Hospice Case Manager; Hospice IDT; Care Team     GOALS OF CARE     Patient/Medical POA stated Goal of Care:comfort     [x] I have reviewed and/or updated ACP information in the Advance Care Planning Navigator. This information is available in the [address] link in the patient's chart header.     Primary Decision Maker ([address]):   Primary Decision Maker: Emilia Byrnes - Spouse - [phone]    Secondary Decision Maker: Arlene Jean Marie  [phone]    Resuscitation Status: DNR  If DNR is there a Durable DNR on file? : [x] Yes [] No (If no, complete Durable DNR)    HISTORY     History obtained from: chart, bed side Rn     CHIEF COMPLAINT: unresponsiveness  The patient is:   [][x] Nonverbal   Unresponsive    HPI/SUBJECTIVE:  Above. Patient was restless this am , now resting s/p ativan and dilaudid. REVIEW OF SYSTEMS     The following systems were: [] reviewed  [x] unable to be reviewed    Positive ROS include:  Constitutional: fatigue, weakness, in pain, short of breath  Ears/nose/mouth/throat: increased airway secretions  Respiratory:shortness of breath, wheezing  Gastrointestinal:poor appetite, nausea, vomiting, abdominal pain, constipation, diarrhea  Musculoskeletal:pain, deformities, swelling legs  Neurologic:confusion, hallucinations, weakness  Psychiatric:anxiety, feeling depressed, poor sleep  Endocrine:     Adult Non-Verbal Pain Assessment Score:      Face  [x] 0   No particular expression or smile  [] 1   Occasional grimace, tearing, frowning, wrinkled forehead  [] 2   Frequent grimace, tearing, frowning, wrinkled forehead    Activity (movement)  [x] 0   Lying quietly, normal position  [] 1   Seeking attention through movement or slow, cautious movement  [] 2   Restless, excessive activity and/or withdrawal reflexes    Guarding  [x] 0   Lying quietly, no positioning of hands over areas of body  [] 1   Splinting areas of the body, tense  [] 2   Rigid, stiff    Physiology (vital signs)  [x] 0   Stable vital signs  [] 1   Change in any of the following: SBP > 20mm Hg; HR > 20/minute  [] 2   Change in any of the following: SBP > 30mm Hg; HR > 25/minute    Respiratory  [x] 0   Baseline RR/SpO2, compliant with ventilator  [] 1   RR > 10 above baseline, or 5% drop SpO2, mild asynchrony with ventilator  [] 2   RR > 20 above baseline, or 10% drop SpO2, asynchrony with ventilator     FUNCTIONAL ASSESSMENT     Palliative Performance Scale (PPS):       PSYCHOSOCIAL/SPIRITUAL ASSESSMENT     Active Problems:    End stage heart failure (HCC) (2020)      Past Medical History:   Diagnosis Date    BPH (benign prostatic hyperplasia)     CHF (congestive heart failure) (HCC)     Degenerative disc disease, lumbar     Heart failure (HCC)     High cholesterol     Hypertension     Ischemic cardiomyopathy     LVAD (left ventricular assist device) present (HCC)     Paroxysmal atrial fibrillation ([address] 2019    Spinal stenosis       Past Surgical History:   Procedure Laterality Date    COLONOSCOPY Left 2019    COLONOSCOPY at bedside performed by Ryan Harrell MD at [facility] ENDOSCOPY    HX APPENDECTOMY      [address] GRAFT      triple    HX HEART ASSIST DEVICE Left 10/29/2019    Impella 5.0    HX HEART ASSIST DEVICE Left 2019    HeartMate 3    HX HERNIA REPAIR      HX IMPLANTABLE CARDIOVERTER DEFIBRILLATOR      HX THROMBECTOMY Left 2019    Left brachial thrombectomy    [place] CARDIOVERSION ELECTIVE ARRHYTHMIA EXTERNAL N/A 6/10/2019    EP CARDIOVERSION performed by Geovanny Pablo MD at [place] [address] CATH LAB    [place] CARDIOVERSION ELECTIVE ARRHYTHMIA EXTERNAL N/A 2019    EP CARDIOVERSION performed by Krista Romeo MD at [place] [address] CATH LAB    [place] INSJ/RPLCMT PERM DFB W/TRNSVNS LDS 1/DUAL CHMBR N/A 2019    INSERT ICD BIV MULTI performed by Radha Cobb MD at [place] [address] CATH LAB    [place] TCAT Colton Valentin P-ART PRS SNR L-T HEMODYN MNTR N/A 2019    IMPLANT HEART FAILURE MONITORING DEVICE performed by Mariia Moss MD at [place] [address] CATH LAB      Social History     Tobacco Use    Smoking status: Former Smoker     Last attempt to quit: 2010     Years since quittin.6    Smokeless tobacco: Never Used   Substance Use Topics    Alcohol use: Not Currently     Comment: rarely     Family History   Problem Relation Age of Onset    Lung Disease Mother     Hypertension Mother     Arthritis-osteo Mother     Heart Disease Mother     Heart Disease Father     Diabetes Father       Allergies   Allergen Reactions    Cefepime Other (comments)     myoclonus      Current Facility-Administered Medications   Medication Dose Route Frequency    haloperidol lactate (HALDOL) injection 2 mg  2 mg IntraVENous Q6H PRN    acetaminophen (TYLENOL) suppository 650 mg  650 mg Rectal Q4H PRN    bisacodyL (DULCOLAX) suppository 10 mg  10 mg Rectal DAILY PRN    HYDROmorphone (PF) (DILAUDID) injection 1 mg  1 mg IntraVENous Q15MIN PRN    furosemide (LASIX) injection 40 mg  40 mg IntraVENous BID PRN    LORazepam (ATIVAN) injection 1 mg  1 mg IntraVENous Q15MIN PRN    glycopyrrolate (ROBINUL) injection 0.2 mg  0.2 mg IntraVENous Q4H    LORazepam (ATIVAN) injection 4 mg  4 mg IntraVENous Q15MIN PRN        PHYSICAL EXAM     Wt Readings from Last 3 Encounters:   08/02/20 163 lb 3.2 oz (74 kg)   07/13/20 179 lb (81.2 kg)   07/08/20 177 lb 14.6 oz (80.7 kg)       There were no vitals taken for this visit. Supplemental O2  [] Yes  [] NO  Last bowel movement:     Currently this patient has:  [] Peripheral IV [] PICC  [] PORT [] ICD    [] Lizama Catheter [] NG Tube   [] PEG Tube    [] Rectal Tube [] Drain  [] Other:     Constitutional:resting   Eyes: pupils equally reactive to light and accomodation.   ENMT: oral mucosa is dry  Cardiovascular: regular rhythm   Respiratory: not labored,  Gastrointestinal: soft , bowel sounds present   Musculoskeletal:  Skin:warm   Neurologic: unresponsive   Psychiatric: was restless earlier   Other:       Pertinent Lab and or Imaging Tests:  Lab Results   Component Value Date/Time    Sodium 136 08/02/2020 05:09 AM    Potassium 4.3 08/02/2020 05:09 AM    Chloride 105 08/02/2020 05:09 AM    CO2 19 (L) 08/02/2020 05:09 AM    Anion gap 12 08/02/2020 05:09 AM    Glucose 100 08/02/2020 05:09 AM    BUN 51 (H) 08/02/2020 05:09 AM Creatinine 1.58 (H) 08/02/2020 05:09 AM    BUN/Creatinine ratio 32 (H) 08/02/2020 05:09 AM    GFR est AA 53 (L) 08/02/2020 05:09 AM    GFR est non-AA 44 (L) 08/02/2020 05:09 AM    Calcium 8.4 (L) 08/02/2020 05:09 AM     Lab Results   Component Value Date/Time    Protein, total 6.9 08/02/2020 05:09 AM    Albumin 2.8 (L) 08/02/2020 05:09 AM           Total time: 70 mins  Counseling / coordination time: 40 mins  > 50% counseling / coordination?:

## 2020-08-02 NOTE — HOSPICE
[facility]  [phone]    Inpatient Nursing Admission   Patient Name: Dulce Madison  YOB: 1950  Age: 71 y.o. Date of Hospice Admission: (Not on file)  Hospice Attending Elected by Patient: Nataly Wallace MD  Primary Care Physician: Evans Guillen MD  Admitting RN: Karolina Osman  : Fritz Madera      Level of Care (GIP/Routine/Respite): [facility]  Facility of Care: [facility]  Patient Room: [facility]/01     HOSPICE SUMMARY   ER Visits/ Hospitalizations in past year: Five ED visits, Five inpatient admissions  Hospice Diagnosis: end stage cardiac disease  Onset Date of Hospice Diagnosis: August 2020  Summary of Disease Progression Leading to Hospice Diagnosis: Patient has had increasing complications and infections r/t to his disease. This admission patient steadily declined with a rapid decline this weekend. Hgb was 5.7 and patient had increased confusion. Family chose comfort measure with hospice support. Co-Morbidities:   Patient Active Problem List   Diagnosis Code    Paroxysmal atrial fibrillation (HCC) I48.0    Acute on chronic systolic CHF (congestive heart failure) (HCC) G02.51    Systolic CHF, chronic (HCC) I50.22    Peripheral vascular disease (HCC) I73.9    Acute decompensated heart failure (HCC) I50.9    Tremor R25.1    Chronic anticoagulation Z79.01    LVAD (left ventricular assist device) present ([facility] Z95.811    Sepsis ([facility] A41.9    Anemia D64.9    Hematuria R31.9    Acute encephalopathy G93.40     Diagnoses RELATED to the terminal prognosis: End stage heart failure   Other Diagnoses: see above     Rationale for a prognosis of life expectancy of 6 months or less if the disease follows its normal course (Disease Specific History): Dulce Madison is a 71 y.o. who was admitted to [facility]. The patient's principle diagnosis of End stage heart failure has resulted in this patient's decline.   Functionally, the patient's Palliative Performance Scale has declined over a period of hours and is estimated at 20. Objective information that support this patients limited prognosis includes: ECHO, chest Xray, hgb of 5.7, CR of 1.58. The patient/family chose comfort measures with the support of Hospice. Patient meets for GIP LOC as evidenced by severe agitation and pain, only able to take IV medications     Prognosis estimated based on 08/02/20 clinical assessment is:   [] Few to Many Hours  [x] Hours to Days   [] Few to Many Days   [] Days to Weeks   [] Few to Many Weeks   [] Weeks to Months   [] Few to Many Months    ASSESSMENT    Patient self-reports:  []  Yes    [x] No    SYMPTOMS: Agitation, pain,     SIGNS/PHYSICAL FINDINGS: Restlessness, moaning, yelling out    KARNOFSKY: 20    FAST for all dementia:  N/A    Learning Assessment:  Patient  Is patient willing/able to learn? No  What is the highest level of education completed? Unknown   Learning preference (written material, demonstration, visual)? No preference   Learning barriers (ESOL, [place], poor vision)? Patient is very confused     Caregiver  Is caregiver willing to learn care for patient? Yes  What is the highest level of education completed? Unknown   Learning preference (written material, demonstration, visual)? No preference   Learning barriers (ESOL, [place], poor vision)? None noted     CLINICAL INFORMATION     Wt Readings from Last 3 Encounters:   08/02/20 74 kg (163 lb 3.2 oz)   07/13/20 81.2 kg (179 lb)   07/08/20 80.7 kg (177 lb 14.6 oz)     Ht Readings from Last 3 Encounters:   07/21/20 6' 2\" (1.88 m)   07/13/20 6' 2\" (1.88 m)   07/06/20 6' 2\" (1.88 m)     There is no height or weight on file to calculate BMI. There were no vitals taken for this visit. LAB VALUES  No results found for this visit on 08/02/20 (from the past 12 hour(s)). No results found for this visit on 08/02/20 (from the past 6 hour(s)).   Lab Results   Component Value Date/Time Protein, total 6.9 2020 05:09 AM    Albumin 2.8 (L) 2020 05:09 AM       Currently this patient has:  [] Supplemental O2 [x] Peripheral IV  [] PICC    [] PORT   [x] Lizama Catheter [] NG Tube   [] PEG Tube [] Ostomy    [x] AICD: Has ICD been deactivated? [] Yes [x] No: primary RN is calling to have ICD turned off now     PLAN     1. Admit to [facility] LOC for symptom management   2. Start with Lorazepam 1 mg every hour as needed, may need to titrate dose and frequency  3. Start with Dilaudid 0.5mg every 15 minutes as needed. May need to titrate dose and frequency. 4. Start with Robinul 0.2 mg every four hours as needed for secretions  5. Start with tylenol 650 mg suppository every four hours as needed for fever. 6. Start with haldol 2 mg every six hours as needed. Hospice Team Frequency Orders:  Skilled Nurse -   Daily x 7 days /every other day x 7 days with 5 PRN visits for symptom control. SUSHIL - 1 visit for initial assessment/evaluation for family support and need for volunteer services. Jennifer Stock - 1 visit for initial assessment/evaluation for spiritual support. ADVANCE CARE PLANNING (Complete in ACP Flow Sheet)   Code Status: DNR  Durable DNR: [x]  Yes  []  No  Code Status Discussed/Confirmed:  Preference for Other Life Sustaining Treatment Discussed/Confirmed:  Hospitalization Preference: Family would like patient to go home if possible, however they understand he may not be able to because of his symptom needs. Advance Care Planning 2020   Patient's Healthcare Decision Maker is: -   Confirm Advance Directive None        Service: [] Yes  []  No      [x] Unknown  Appropriate for Pinning Ceremony:  [] Yes     [] No  Voodoo: [address] [facility]: undecided     DISCHARGE PLANNING     1. Discharge Plan: Patient most likely to  at [facility]   2. Patient/Family teaching: yes   3.  Response to patient/family teaching: receptive     SOCIAL/EMOTIONAL/SPIRITUAL NEEDS     Spiritual Issues Identified:  consulted, to see patient and family if needed     Psych/ Social/ Emotional Issues Identified: MSW following     Caregiver Support:  [x] Provided information on End of Life Care   [x] Material Provided: Gone From My Sight or [facility]'s [facility]     CARE COORDINATION   NP Marylen Custard  contacted, discharge to hospice order received  Dr. Perez Co contacted, agrees to serve as attending provider for hospice and provided verbal certification of terminal illness with life expectancy of 6 months or less. Orders for hospice admission, medications and plan of treatment received. Medication reconciliation completed. MEDS: See medication list below  DME: Per hospital  Supplies: Per hospital  IDT communication to include MD, SN, SW, CH and support team    ALLERGIES AND MEDICATIONS     Allergies: Allergies   Allergen Reactions    Cefepime Other (comments)     myoclonus     No current facility-administered medications for this encounter. No current outpatient medications on file.      Facility-Administered Medications Ordered in Other Encounters   Medication Dose Route Frequency    0.9% sodium chloride infusion 250 mL  250 mL IntraVENous PRN    LORazepam (ATIVAN) 2 mg/mL injection        meropenem (MERREM) 500 mg in 0.9% sodium chloride (MBP/ADV) 50 mL  500 mg IntraVENous Q8H    ALPRAZolam (XANAX) tablet 0.25 mg  0.25 mg Oral TID PRN    sodium chloride (OCEAN) 0.65 % nasal squeeze bottle 2 Spray  2 Spray Both Nostrils TID    sodium bicarbonate tablet 650 mg  650 mg Oral BID    oxymetazoline (AFRIN) 0.05 % nasal spray 2 Spray  2 Spray Both Nostrils BID PRN    hydrALAZINE (APRESOLINE) tablet 10 mg  10 mg Oral TID    gentamicin (GARAMYCIN) 0.1 % cream   Topical BID Mon Wed & Fri    hydrALAZINE (APRESOLINE) 20 mg/mL injection 10 mg  10 mg IntraVENous Q4H PRN    epoetin yvonne-epbx (RETACRIT) injection 10,000 Units  10,000 Units SubCUTAneous Q MON, WED & FRI    0.9% sodium chloride infusion 250 mL  250 mL IntraVENous PRN    calcium polycarbophiL (FIBERCON) tablet 625 mg  1 Tab Oral DAILY    sodium chloride (NS) flush 5-40 mL  5-40 mL IntraVENous Q8H    sodium chloride (NS) flush 5-40 mL  5-40 mL IntraVENous PRN    acetaminophen (TYLENOL) tablet 650 mg  650 mg Oral Q4H PRN    ondansetron (ZOFRAN) injection 4 mg  4 mg IntraVENous Q4H PRN    bisacodyL (DULCOLAX) tablet 5 mg  5 mg Oral DAILY PRN    digoxin (LANOXIN) tablet 0.125 mg  0.125 mg Oral EVERY OTHER DAY    finasteride (PROSCAR) tablet 5 mg  5 mg Oral DAILY    lactobac ac& pc-s.therm-b.anim (TIAN Q/RISAQUAD)  1 Cap Oral DAILY    levETIRAcetam (KEPPRA) tablet 250 mg  250 mg Oral BID    tamsulosin (FLOMAX) capsule 0.8 mg  0.8 mg Oral QHS    venlafaxine-SR (EFFEXOR-XR) capsule 150 mg  150 mg Oral DAILY WITH BREAKFAST

## 2020-08-03 NOTE — [facility] IDG SOCIAL WORKER NOTES
Patient: Leora Valadez    Date: 08/03/20  Time: 4:03 PM    [facility]  Notes    Pt is unresponsive, LVAD to be turned off shortly. Family tearful, processing pts likey death soon after this occurs. LCSW will provide emotional support for family in processing pts antiicpated death. Low risk for BR, well supported, realistic.        Signed by: Bernadette Loya

## 2020-08-03 NOTE — HOSPICE
[facility] Help to Those in Need  [phone]    Social Work Admission Note  Patient Name: Marina Michael  YOB: 1950  Age: 71 y.o. Date of Visit: 08/03/20  Facility of Care: [facility]  Patient Room: [facility]/01     Hospice Attending: Aura Hanna MD  Hospice Diagnosis: End stage heart failure ([address] [I50.84]    Level of Care:    [x]  GIP    []  Respite   []  Routine    NARRATIVE   This LCSW in to pts room. Wife Sree Greenberg, daughter Celsa Webber, son Lizzie Whitaker his wife were present. Pt is unresponsive, LVAD to be turned off shortly. Family tearful, processing pts likey death soon after this occurs. LCSW provided empathetic listening as family talked about the events that have lead up to this time. Family reports pt is a \"fighter\", he has done better than all had expected, but \"things have not gone as we planned\". Wife Sree Greenberg reports pt stated he was \"tired\". LCSW affirmed pts being a fighter in the past, but now he is fatigued and tired. Wife talked about regrets pt could not return home to die. LCSW affirmed family decision for pt to remain at [facility], having the medical support he needs, having MD/RN's at bedside. LCSW talked about home being where the family is gathered, and family is now bedside. Family reports pt has been in and out of this unit at [facility] for some time. Family noted pt is well loved by RN/staff on the unit. LCSW led family in processing. LCSW provided emotional support for family in processing pts antiicpated death. LCSW and family talked about pts relationship with God. Family reports pt was not a Catholic man, but worshipped God in his own way. Family reports pt loved to be outside. 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 LCSW validated his individual relationship with God, and affirmed the way in which he workships. Family awaiting medical team to turn off LVAD. [facility] arriving shorly to provide support for family during this process.  LCSW  will continue to support family during this time. LCSW was present for pt and family when pt passed. LCSW provided support to wife and family. Passing was peaceful. ADVANCE CARE PLANNING    Code Status: DDNR   Durable DNR: Rj Passer  _ No  Advance Care Planning 8/2/2020   Patient's Healthcare Decision Maker is: -   Confirm Advance Directive Yes, on file       Relationship Status:  []  Single     [x]        []      []  Domestic Partner     []  /  []  Common Law  []    []  Unknown    If in a relationship, name of partner/spouse:  Duration of relationship:    Amish: [address] [place] [address] Avenue: Will discuss with family  Resources Provided: BR      Social Work Initial Assessment     Gender:  male    Race/Ethnicity: (simi all that apply)  []  [place] [facility] or [place] Native  []  [address]  []  Black or [place] American  []   or   []  [address] [facility] or [facility]  [x]  Devontene Sin  []  Unknown      Service:    []  Yes   [x]  No       []  Unknown  Appropriate for Pinning Ceremony:   []  Yes      []  No  Is patient using VA benefits?    []  Yes      []  No     Primary Language: English   []   Needed  []   utilized during visit    Ability to express thoughts/needs/feelings  []  Expressed thoughts/feelings/needs without difficulty  []  Requires extra time and cuing  []  Speech limited single words  []  Uses only gestures (eye, blinking eye or head movement/pointing)  []  Unable to express thoughts/feelings/needs (speech unintelligible or inappropriate)  [x]  Unresponsive  Notes:      Mental Status:  []  Alert-oriented to:     []  Person     []  Place     []  Time  []  Comatose-responds to:    []   Verbal stimuli    []  Tactile stimuli    []  Painful stimuli  []  Forgetful  []  Disoriented/Confused  []  Lethargic  []  Agitated  [x]  Other (specify): Unresponsive   Notes:      Patients description of Illness/Current Health Status:    [x]  Patient unable to discuss  []  Patient unwilling to discuss  []  (Specify)        Knowledge/Understanding of Disease Process  Patient:    []  Demonstrates knowledge/understanding of disease process   []  Demonstrates knowledge/understanding of treatment plan   []  Demonstrates knowledge/understanding of prognosis   []  Demonstrates acceptance of prognosis   []  Demonstrates knowledge/understanding of resuscitation status   [x]  Other (specify), Unresponsive   Caregiver:   [x]  Demonstrates knowledge/understanding of disease process   [x]  Demonstrates knowledge/understanding of treatment plan   [x]  Demonstrates knowledge/understanding of prognosis   [x]  Demonstrates acceptance of prognosis   [x]  Demonstrates knowledge/understanding of resuscitation status   []  Other (specify)  Notes:      Patients living arrangement/care setting:  Use the PRIOR COLUMN when the PATIENTS current health status necessitated a change in his/her primary residence. Prior Current Response              [x]             []    Patients own home/residence              []             []    Home of family member/friend              []             []    Boarding home              []             []    Assisted living facility/long term center              []             []    Hospital/Acute care facility              []             []    Skilled nursing facility              []             []    Long term care facility/Nursing home              []             [x]    Hospice in Patient      Primary Caregiver:  []  No Primary Caregiver  Name of Primary Caregiver: [employer]   Relationship or Primary Caregiver:    [x]  Spouse/Significant other       []  Natural Child        []  Step child       []  Sibling   []  Parent   []  Friend/Neighbor   []  Community/Nondenominational Volunteer   []  Paid help   []  Other (specify):___________  Notes:       Family members/Significant others:  Name:Rita   Relationship: wife   Phone Number: [phone]   Actively involved in care?   [x] Yes  []  No    Name:Slim   Relationship:son  Phone [place]  Actively involved in care? [x]  Yes  []  No    Name:Oksana   Relationship: daughter   Phone Number:  Actively involved in care? [x]  Yes  []  No    Social support systems: (select ONE best description)  [x]  Excellent social support system which includes three or more family members or friends  []  Good social support system which includes two or less members or friends  []  [address] support which includes one family member or friend  []  Poor social support; no family members or friends; basically ALONE  Notes:      Emotional Status: (simi all that apply)    Patient Caregiver Response                 [x]                []    Mood/Affect stable and appropriate                   []                []    Angry                 []                []    Anxious                 []                []    Apprehensive                 []                []    Avoidant                 []                []    Clinging                 []                []    Depressed                 []                []    Distraught                 []                []    Elated                 []                []    Euphoric                 []                []    Fearful                 []                []    Flat Affect                 []                []    Helpless                 []                []    Hostile                 []                []    Impulsive                 []                []    Irritable                 []                []    Labile                 []                []    Manic                 []                []    Restlessness                 []                [x]    Sad                 []                []    Suspicious                 []                [x]    Tearful                 []                []    Withdrawn     Notes:     Coping Skills (strengths/weakness):    Patient: Coping Skills (strength/weakness): Unresponsive Family/caregiver (strength/weakness):  Well supported, realistic,/ normal anxiety about LVAD being turned off, pt with long standing heart problems.      [place] of care (simi all that apply):     [x]  No burden evident   []  Family must administer medications   []  Illness causing financial strain   []  Family/Support feels overwhelmed   []  Family/Support sleep disturbed with patients care   []  Patients care causes extra physical stress  of death  []  Illness causes changes in family lifestyle  []  Illness impacting family/support employment  []  Family experiencing increased time demands  []  Patients behavior endangers family  []  Denial of patients illness  []  Concern over outcome of illness/fear  []  Patients behavior embarrassing to family   Notes:      Risk Factors: (simi all that apply):    [x]  No burden evident   []  Alcohol abuse  []  Financial resources inadequate to meet basic needs (food/house/etc)  []  Financial resources inadequate to meet health care needs (supplies/equipment/medications)  []  Food/nutrition resources inadequate  []  Home environment unsafe/inadequate for home care  []  Homicidal risk  []  Lives alone or without concerned relatives  []  Multiple medications/complex schedule  []  Physical limitations increase likelihood of falls  []  Plan of care/treatments complicated  []  Substance use/abuse  []  Suicidal risk  []  Visual impairment threatens safety/ability to perform self-care  []  Other (specify):     Abuse/Neglect (actual/potential risks):  [x]  No signs of abuse/neglect  []  History of abuse/neglect                 []  HADHHAJA          []  Sexual  []  History of domestic violence  []  Lacks adequate physical care  []  Lacks emotional nurturing/support  []  Lacks appropriate stimulation/cognitive experiences  []  Left alone inappropriately  []  Lacks necessary supervision  []  Inadequate or delayed medical care  []  Unsafe environment (i.e guns/drug use/history of violence in the home/etc.)  []  Bruising or other physical signs of injury present  []  Other (specify):  Notes:   []  Refer to child/adult protective services      Current Sources of Stress (in Addition to Current Illness):   [x]  None reported  []  Bills/Debt    []  Career/Job change    []   (short term)    []   (long term)    []  Death of a child (recent)    []  Death of a parent (recent)   []  Death of a spouse (recent)   []  Employment status changed   []  Family discord    []  Financial loss/Inadequate inther (specify):come  []  Job loss  []  Legal issues unresolved  []  Lifestyle change  []  Marital discord  []  Marriage within the last year  []  Paperwork (insurance/legal/etc) overwhelming  []  Separation/Divorce  []  Other (specify):  Notes:      Current [employer] Being Utilized     1. Interventions/Plan of Care     1. Assess social and emotional factors related to coping with end of life issues  2. Community resource planning/referral   3. Relocation to different care setting if/when symptoms stabilize  4. Discharge Planning     1.  [facility] at [facility]    MSW Assessment Completed by: Armando Tran  08/03/20    Time In:11:00 am        Time Out:12:30 pm

## 2020-08-03 NOTE — PROGRESS NOTES
Responded to call from [facility] RN requesting support for family as pt would have LVAD removed. Following conversation with Hospice staff and chart review met with pt and family. Pt alert and appeared slightly uncomfortable, able to speak but difficult to understand. Pt was able to acknowledge . Introduced self and role. Welcomed by pt's wife, son and his wife, and daughter who all appeared to be coping well self-reporting the same, adding that they didn't want pt to suffer anymore.  took time to establish rapport. Led in processing, received history of wife meeting pt and marrying two years later. Pt worked in agriculture and enjoyed the outdoors. Leydi is a source of strength for family. Drawing from this provided words of comfort and reassurance. Allowed some time for family to spend with pt prior to LVAD being removed. Present when LVAD removed. Provided prayer at bedside followed by playing of \"Safe in the Arms of Kris\" as pt passed. Family tearful, grieving appropriately. Expressed appreciation to staff.

## 2020-08-03 NOTE — PROGRESS NOTES
[facility] in [place], [place]  Inpatient Progress Note      Patient name: Rosenda Stanley  Patient : 1950  Patient MRN: [id number]  Attending MD: Luis Miguel Li MD  Date of service: 20    CHIEF COMPLAINT:  UTI in patient with LVAD    PLAN:  Family at bedside; comfort care and plans to turn off LVAD today  Palliative Care and Hospice Care consult appreciated   Called [facility] to Deactivate ICD  DNR/DDNR  D/C hospice team, beside nurse and Dr. Augustus Rick:  Bladder Carcinoma  Gross Hematuria  UTI  Metabolic encephalopathy  Chronic systolic heart failure  Stage D, NYHA class II symptoms  Coronary artery disease  Ischemic cardiomyopathy, LVEF 15%  S/p HM3 LVAD as destination therapy (19 by Dr. Stew Nieves)  S/p Impella 5.0 as bridge to LVAD  HTN, goal MAP 70-90mmHg  H/o VT s/p [facility] BIV-ICD  PAF  H/o recurrent UTI, pseudomonal infection  Unable to tolerate AC due to hematuria  MSSA drive line infection  COPD  JOSE  Essential tremor on keppra  Depression on effexor  Persistently elevated CEA PET scan increased RML activity, not malignant per hemonc  Orthostatic hypotension  DNR  Up-to-date with PNA vaccine - per PCP/wife  2/4bottles blood cultures +pseudomonas (20)   diarrhea  Epistaxis   Confusion  Papillary urothelial carcinoma, high-grade, with lamina propria invasion  cystoscopy and TURBT (20) -  resection of large papillary and solid bladder tumor on posterior wall   Comfort care        CARDIAC IMAGING:  TTE  shows large LVIDd, 6.84 cm, RVIDd 3.06 cm, TAPSE 1.15 cm, severely elevated CVP  TTE 20- Mild AI, LViDd 6.91cm, RVIDd 5.16cm, TAPSE 1.39cm  Echo (20)  · Contrast used: DEFINITY. · Image quality for this study was technically difficult. · Severely dilated left ventricle. Wall thickness appears thin. Severe systolic function. Estimated left ventricular ejection fraction is <15%. Visually measured ejection fraction. · Moderately dilated left atrium. · Moderately dilated right ventricle. Moderately to severely reduced systolic function. · Dilated right atrium. · Moderate mitral valve prolapse.     LVAD INTERROGATION:  Device interrogated in person  Device function normal, normal flow, no events  LVAD   Pump Speed (RPM): 6000  Pump Flow (LPM): 4.8  MAP: 82  PI (Pulsitility Index): 3.5  Power: 4.8   Test: No  Back Up  at Bedside & Labeled: Yes  Power Module Test: No  Driveline Site Care: No  Driveline Dressing: Clean, Dry, and Intact  Outpatient: No     PHYSICAL EXAM:  Visit Vitals  Pulse 93   Temp 98.4 °F (36.9 °C)   Resp 15   SpO2 97%     General: Patient is well developed, well-nourished in no acute distress  HEENT: Opens eyes, withdraws to pain. Neck: Supple. No evidence of thyroid enlargements or lymphadenopathy. JVD: Cannot be appreciated   Lungs: Breath sounds are equal and clear bilaterally. No wheezes, rhonchi, or rales. Heart: Regular rate and rhythm with normal S1 and S2. No murmurs, gallops or rubs. Abdomen: Soft, no mass or tenderness. No organomegaly or hernia. Bowel sounds present. Genitourinary and rectal: deferred  Extremities: No cyanosis, clubbing, or edema. Neurologic: Grossly intact. Psychiatric: Confused, oriented x 0. Skin: Warm, dry and well perfused. No lesions, nodules or rashes are noted. REVIEW OF SYSTEMS:  General: Unable to obtain.     PAST MEDICAL HISTORY:  Past Medical History:   Diagnosis Date    BPH (benign prostatic hyperplasia)     CHF (congestive heart failure) (HCC)     Degenerative disc disease, lumbar     Heart failure (HCC)     High cholesterol     Hypertension     Ischemic cardiomyopathy     LVAD (left ventricular assist device) present ([facility])     Paroxysmal atrial fibrillation ([address] 2019    Spinal stenosis      PAST SURGICAL HISTORY:  Past Surgical History:   Procedure Laterality Date    COLONOSCOPY Left 2019    COLONOSCOPY at bedside performed by Dylon Stewart MD at [facility] ENDOSCOPY    HX APPENDECTOMY      HX CORONARY ARTERY BYPASS GRAFT      triple    HX HEART ASSIST DEVICE Left 10/29/2019    Impella 5.0    HX HEART ASSIST DEVICE Left 2019    HeartMate 3    HX HERNIA REPAIR      HX IMPLANTABLE CARDIOVERTER DEFIBRILLATOR      HX THROMBECTOMY Left 2019    Left brachial thrombectomy    CT CARDIOVERSION ELECTIVE ARRHYTHMIA EXTERNAL N/A 6/10/2019    EP CARDIOVERSION performed by Anna Bolden MD at [address] CATH LAB    CT CARDIOVERSION ELECTIVE ARRHYTHMIA EXTERNAL N/A 2019    EP CARDIOVERSION performed by Malinda Caldwell MD at [facility] [address] CATH LAB    CT INSJ/RPLCMT PERM DFB W/TRNSVNS LDS 1/DUAL CHMBR N/A 2019    INSERT ICD BIV MULTI performed by Steven Araujo MD at [address] [facility] [address] CATH LAB    CT TCAT IMPL WRLS P-ART PRS SNR L-T HEMODYN MNTR N/A 2019    IMPLANT HEART FAILURE MONITORING DEVICE performed by Michelle Steve MD at [address] [place] [address] CATH LAB       FAMILY HISTORY:  Family History   Problem Relation Age of Onset    Lung Disease Mother     Hypertension Mother     Arthritis-osteo Mother     Heart Disease Mother     Heart Disease Father     Diabetes Father        SOCIAL HISTORY:  Social History     Socioeconomic History    Marital status:      Spouse name: Not on file    Number of children: Not on file    Years of education: Not on file    Highest education level: Not on file   Tobacco Use    Smoking status: Former Smoker     Last attempt to quit: 2010     Years since quittin.6    Smokeless tobacco: Never Used   Substance and Sexual Activity    Alcohol use: Not Currently     Comment: rarely    Drug use: Never   Other Topics Concern       LABORATORY RESULTS:     Labs Latest Ref Rng & Units 2020 8/1/2020 7/31/2020 7/30/2020 7/29/2020 7/28/2020   WBC 4.1 - 11.1 K/uL 5.8 - 5.3 5.7 5.8 5.7 5.9   RBC 4.10 - 5.70 M/uL 1.96(L) - 2.46(L) 2.50(L) 2.49(L) 2.57(L) 2.54(L)   Hemoglobin 12.1 - 17.0 g/dL 5. 7(LL) - 7. 2(L) 7. 4(L) 7. 4(L) 7. 9(L) 7. 5(L)   Hematocrit 36.6 - 50.3 % 18. 8(L) - 23. 2(L) 23. 9(L) 23. 6(L) 24. 6(L) 24. 6(L)   MCV 80.0 - 99.0 FL 95.9 - 94.3 95.6 94.8 95.7 96.9   Platelets 629 - 820 K/uL 154 - 151 157 157 179 169   Lymphocytes 12 - 49 % - - - - - - -   Monocytes 5 - 13 % - - - - - - -   Eosinophils 0 - 7 % - - - - - - -   Basophils 0 - 1 % - - - - - - -   Albumin 3.5 - 5.0 g/dL 2. 8(L) 3.0(L) 2. 9(L) 2. 8(L) 2. 7(L) 2. 7(L) 2. 6(L)   Calcium 8.5 - 10.1 MG/DL 8.4(L) 8.9 8.7 8.9 8.6 8.5 8.6   Glucose 65 - 100 mg/dL 100 98 96 96 94 91 94   BUN 6 - 20 MG/DL 51(H) 29(H) 21(H) 23(H) 25(H) 22(H) 24(H)   Creatinine 0.70 - 1.30 MG/DL 1.58(H) 1.22 1.29 1.29 1.34(H) 1.31(H) 1.34(H)   Sodium 136 - 145 mmol/L 136 135(L) 134(L) 134(L) 134(L) 136 135(L)   Potassium 3.5 - 5.1 mmol/L 4.3 4.2 4.0 4.0 4.2 4.4 4.4   TSH 0.36 - 3.74 uIU/mL - - - - - - -   PSA 0.01 - 4.0 ng/mL - - - - - - -   LDH 85 - 241 U/L 213 - 207 209 165 191 184   Some recent data might be hidden     Lab Results   Component Value Date/Time    TSH 1.13 07/18/2020 11:28 AM    TSH 1.70 04/21/2020 01:59 PM    TSH 2.30 12/03/2019 03:29 AM    TSH 2.40 10/25/2019 07:39 PM    TSH 2.45 06/01/2019 04:16 AM       ALLERGY:  Allergies   Allergen Reactions    Cefepime Other (comments)     myoclonus        CURRENT MEDICATIONS:    Current Facility-Administered Medications:     haloperidol lactate (HALDOL) injection 2 mg, 2 mg, IntraVENous, Q6H PRN, Constanza Amaya MD    acetaminophen (TYLENOL) suppository 650 mg, 650 mg, Rectal, Q4H PRN, Constanza Amaya MD    bisacodyL (DULCOLAX) suppository 10 mg, 10 mg, Rectal, DAILY PRN, Natalee Gilbert MD    HYDROmorphone (PF) (DILAUDID) injection 1 mg, 1 mg, IntraVENous, Q15MIN PRN, Constanza Amaya MD    furosemide (LASIX) injection 40 mg, 40 mg, IntraVENous, BID PRN, Kate Munguia MD    LORazepam (ATIVAN) injection 1 mg, 1 mg, IntraVENous, Q15MIN PRN, Kate Munguia MD    glycopyrrolate (ROBINUL) injection 0.2 mg, 0.2 mg, IntraVENous, Q4H, Kate Munguia MD, 0.2 mg at 08/03/20 0724    LORazepam (ATIVAN) injection 4 mg, 4 mg, IntraVENous, Q5MIN PRN, Kate Munguia MD    HYDROmorphone (PF) (DILAUDID) injection 1 mg, 1 mg, IntraVENous, Q4H, Kate Munguia MD, 1 mg at 08/03/20 9109    LORazepam (ATIVAN) injection 1 mg, 1 mg, IntraVENous, Q4H, Kate Munguia MD, 1 mg at 08/03/20 2052    Thank you for allowing me to participate in this patient's care.     Rashad Erazo MD PhD  [address] [place] [address], [address]  Phone: [phone]  Fax: [phone]

## 2020-08-03 NOTE — PROGRESS NOTES
[facility]  [phone]    Patient Name: Verenice Keen  YOB: 1950    Date of Provider Hospice Visit: 08/03/20    Level of Care:   [x] General Inpatient (GIP)    [] Routine   [] Respite    Current Location of Care:  [x] [facility] [] [facility] [] [address] [] [address] [] Hospice House [facility], patient referred from:  [] [facility] [] [facility] [] [address] [] [address] [] Home [] Other:     Date of Original Hospice Admission: 8/2/20  Hospice Medical Director at time of Lissa Montelongo MD     Principle Hospice Diagnosis: sepsis  Diagnoses RELATED to the terminal prognosis: heart failure s/p LVAD  Other Diagnoses: CKD, Bladder cancer , recurrent sepsis . Brandon Bailey is a 71y.o. year old male with hx of heart failure s/p LVAD in Nov 2019, recurrent pseudomonas bacteremia, bladder cancer , CKD who was admitted to [facility] on 7/17/20 for confusion and Pseudomonas bacteremia, secondary to driveline infection, ID suggested long term I/V antibiotics via I/V route, in addition he had hematuria, underwent cystoscopy with biopsy showing bladder cancer . Patient noted to have on going confusion , family was considering home hospice. Over the course of last night patient became restless, agitated his hb dropped , family decided to withdraw active treatment inclding AICD and stopping LVAD support . who was admitted to [facility]. The patient's principle diagnosis has resulted in : HB of 5.7, CREATININE OF 1.58, BNP 6.956    Functionally, the patient's Karnofsky and/or Palliative Performance Scale has declined over a period of 48 hrs and is estimated at 10. The patient is dependent on the following ADLs:  The patient/family chose comfort measures with the support of Hospice. HOSPICE DIAGNOSES   Active Symptoms:  1. Agitation  2. Restlessness  3. Sepsis  4. Pain   5. Unresponsiveness         PLAN   1.  Patient will continue GIP level of care as he is requiring IV medication management, frequent nursing assessment, and will be transitioning off the LVAD this morning. 2. Will start dilaudid PCA-1 mg/hr with 0.5 mg every 6 min bolus. Will stop scheduled dilaudid once the PCA started. 3. Ativan 2 mg IV prior to turning off the LVAD  4. Will continue scheduled ativan but will adjust to every 2 hours  5. Plan reviewed with hospice liaison, advanced heart failure team, family on plan-spouse, daughter  1. Psychosocial support : Ongoing support to family. Difficult transition but family all appears in support of this transition towards comfort. Want to make sure patient is very comfortable, not agitated, not restless, not short of breath prior to stopping LVAD. [place] 6. Spritual care: family denies any spritual care needs. 7. Disposition: will pass away in the hospital .  8. Hospice Plan of care was reviewed in detail and agree with current plan of care    Prognosis estimated based on 08/03/20 clinical assessment is:   [x] Hours to Days    [] Days to Weeks    [] Other:    Communicated plan of care with: Hospice Case Manager; Hospice IDT; Care Team     GOALS OF CARE     Patient/Medical POA stated Goal of Care:comfort     [x] I have reviewed and/or updated ACP information in the Advance Care Planning Navigator. This information is available in the [address] link in the patient's chart header. Primary Decision [address] [facility] Agent):   Primary Decision Maker: Adananup Borregozachery - Spouse - [phone]    Secondary Decision Maker: Azalia Noble - [phone]    Resuscitation Status: DNR  If DNR is there a Durable DNR on file? : [x] Yes [] No (If no, complete Durable DNR)    HISTORY     History obtained from: chart, bed side Rn     CHIEF COMPLAINT: unresponsiveness  The patient is:   [][x] Nonverbal   Unresponsive    HPI/SUBJECTIVE:  Above. Patient was restless this am , now resting s/p ativan and dilaudid.     8/3-patient slightly restless. No significant work of breathing.          REVIEW OF SYSTEMS     The following systems were: [] reviewed  [x] unable to be reviewed    Positive ROS include:  Constitutional: fatigue, weakness, in pain, short of breath  Ears/nose/mouth/throat: increased airway secretions  Respiratory:shortness of breath, wheezing  Gastrointestinal:poor appetite, nausea, vomiting, abdominal pain, constipation, diarrhea  Musculoskeletal:pain, deformities, swelling legs  Neurologic:confusion, hallucinations, weakness  Psychiatric:anxiety, feeling depressed, poor sleep  Endocrine:     Adult Non-Verbal Pain Assessment Score: 3    Face  [] 0   No particular expression or smile  [x] 1   Occasional grimace, tearing, frowning, wrinkled forehead  [] 2   Frequent grimace, tearing, frowning, wrinkled forehead    Activity (movement)  [] 0   Lying quietly, normal position  [x] 1   Seeking attention through movement or slow, cautious movement  [] 2   Restless, excessive activity and/or withdrawal reflexes    Guarding  [x] 0   Lying quietly, no positioning of hands over areas of body  [] 1   Splinting areas of the body, tense  [] 2   Rigid, stiff    Physiology (vital signs)  [x] 0   Stable vital signs  [] 1   Change in any of the following: SBP > 20mm Hg; HR > 20/minute  [] 2   Change in any of the following: SBP > 30mm Hg; HR > 25/minute    Respiratory  [] 0   Baseline RR/SpO2, compliant with ventilator  [x] 1   RR > 10 above baseline, or 5% drop SpO2, mild asynchrony with ventilator  [] 2   RR > 20 above baseline, or 10% drop SpO2, asynchrony with ventilator     FUNCTIONAL ASSESSMENT     Palliative Performance Scale-10      PSYCHOSOCIAL/SPIRITUAL ASSESSMENT     Active Problems:    End stage heart failure (HCC) (8/2/2020)      Past Medical History:   Diagnosis Date    BPH (benign prostatic hyperplasia)     CHF (congestive heart failure) (HCC)     Degenerative disc disease, lumbar     Heart failure (HCC)     High cholesterol     Hypertension     Ischemic cardiomyopathy     [facility]left ventricular [facility] device[facility]     Paroxysmal atrial fibrillation ([facility] 2019    Spinal stenosis       Past Surgical History:   Procedure Laterality Date    COLONOSCOPY Left 2019    COLONOSCOPY at bedside performed by Lupe Levine MD at [address] HX APPENDECTOMY      [address] GRAFT      triple    HX HEART ASSIST DEVICE Left 10/29/2019    Impella 5.0    HX HEART ASSIST DEVICE Left 2019    HeartMate 3    HX HERNIA REPAIR      HX IMPLANTABLE CARDIOVERTER DEFIBRILLATOR      HX THROMBECTOMY Left 2019    Left brachial thrombectomy    NV CARDIOVERSION ELECTIVE ARRHYTHMIA EXTERNAL N/A 6/10/2019    EP CARDIOVERSION performed by Zeyad Onofre MD at [address] CATH LAB    NV CARDIOVERSION ELECTIVE ARRHYTHMIA EXTERNAL N/A 2019    EP CARDIOVERSION performed by Jens Cottrell MD at [address] [place] [address] CATH LAB    NV INSJ/RPLCMT PERM DFB W/TRNSVNS LDS 1/DUAL CHMBR N/A 2019    INSERT ICD BIV MULTI performed by Zeeshan Kirk MD at [address] [facility] CATH LAB    NV TCAT IMPL WRLS P-ART PRS SNR L-T HEMODYN MNTR N/A 2019    IMPLANT HEART FAILURE MONITORING DEVICE performed by Yolette Santos MD at [address] [place] [address] CATH LAB      Social History     Tobacco Use    Smoking status: Former Smoker     Last attempt to quit: 2010     Years since quittin.6    Smokeless tobacco: Never Used   Substance Use Topics    Alcohol use: Not Currently     Comment: rarely     Family History   Problem Relation Age of Onset    Lung Disease Mother     Hypertension Mother     Arthritis-osteo Mother     Heart Disease Mother     Heart Disease Father     Diabetes Father       Allergies   Allergen Reactions    Cefepime Other (comments)     myoclonus      Current Facility-Administered Medications   Medication Dose Route Frequency    HYDROmorphone (PF) 25 mg/50 mL (DILAUDID) PCA   IntraVENous CONTINUOUS    LORazepam (ATIVAN) tablet 2 mg  2 mg Oral ONCE    haloperidol lactate (HALDOL) injection 2 mg  2 mg IntraVENous Q6H PRN    acetaminophen (TYLENOL) suppository 650 mg  650 mg Rectal Q4H PRN    bisacodyL (DULCOLAX) suppository 10 mg  10 mg Rectal DAILY PRN    HYDROmorphone (PF) (DILAUDID) injection 1 mg  1 mg IntraVENous Q15MIN PRN    furosemide (LASIX) injection 40 mg  40 mg IntraVENous BID PRN    LORazepam (ATIVAN) injection 1 mg  1 mg IntraVENous Q15MIN PRN    glycopyrrolate (ROBINUL) injection 0.2 mg  0.2 mg IntraVENous Q4H    LORazepam (ATIVAN) injection 4 mg  4 mg IntraVENous Q5MIN PRN    HYDROmorphone (PF) (DILAUDID) injection 1 mg  1 mg IntraVENous Q4H    LORazepam (ATIVAN) injection 1 mg  1 mg IntraVENous Q4H        PHYSICAL EXAM     Wt Readings from Last 3 Encounters:   08/02/20 74 kg (163 lb 3.2 oz)   07/13/20 81.2 kg (179 lb)   07/08/20 80.7 kg (177 lb 14.6 oz)       Visit Vitals  Pulse 93   Temp 98.4 °F (36.9 °C)   Resp 15   SpO2 97%       Supplemental O2  [] Yes  [] NO  Last bowel movement:     Currently this patient has:  [x] Peripheral IV [] PICC  [] PORT [] ICD    [] Lizama Catheter [] NG Tube   [] PEG Tube    [] Rectal Tube [] Drain  [] Other:     Constitutional: Slightly restless  Eyes: pupils equally reactive to light and accomodation.   ENMT: oral mucosa is dry  Cardiovascular: regular rhythm, LVAD home  Respiratory: Slight evidence of work of breathing, no secretions,  Gastrointestinal: soft , bowel sounds present   Musculoskeletal: Muscle wasting  Skin:warm   Neurologic: Minimally responsive  Psychiatric: Slightly restless  Other:       Pertinent Lab and or Imaging Tests:  Lab Results   Component Value Date/Time    Sodium 136 08/02/2020 05:09 AM    Potassium 4.3 08/02/2020 05:09 AM    Chloride 105 08/02/2020 05:09 AM    CO2 19 (L) 08/02/2020 05:09 AM    Anion gap 12 08/02/2020 05:09 AM    Glucose 100 08/02/2020 05:09 AM    BUN 51 (H) 08/02/2020 05:09 AM    Creatinine 1.58 (H) 08/02/2020 05:09 AM    BUN/Creatinine ratio 32 (H) 08/02/2020 05:09 AM    GFR est AA 53 (L) 08/02/2020 05:09 AM    GFR est non-AA 44 (L) 08/02/2020 05:09 AM    Calcium 8.4 (L) 08/02/2020 05:09 AM     Lab Results   Component Value Date/Time    Protein, total 6.9 08/02/2020 05:09 AM    Albumin 2.8 (L) 08/02/2020 05:09 AM           Total time:   Counseling / coordination time:   > 50% counseling / coordination?:

## 2020-08-03 NOTE — HSPC IDG CHAPLAIN NOTES
Patient: Luis Perez    Date: 08/03/20  Time: 2:27 PM    [facility]  Notes  Provided spiritual support for pt and family leading up to pt's passing. Signed by: Leticia Vasquez

## 2020-08-03 NOTE — PROGRESS NOTES
Responded to call from [facility] RN requesting support for family as pt would have LVAD removed. Following conversation with Hospice staff and chart review met with pt and family. Pt alert and appeared slightly uncomfortable, able to speak but difficult to understand. Pt was able to acknowledge . Introduced self and role. Welcomed by pt's wife, son and his wife, and daughter who all appeared to be coping well self-reporting the same, adding that they didn't want pt to suffer anymore.  took time to establish rapport. Led in processing, received history of wife meeting pt and marrying two years later. Pt worked in agriculture and enjoyed the outdoors. Leydi is a source of strength for family. Drawing from this provided words of comfort and reassurance. Allowed some time for family to spend with pt prior to LVAD being removed. Present when LVAD removed. Provided prayer at bedside followed by playing of \"Safe in the Arms of Kris\" as pt passed. Family tearful, grieving appropriately. Expressed appreciation to staff. SALONI Caro

## 2020-08-03 NOTE — HOSPICE
[facility]  [phone]    Discharge/Death Nursing Note   Patient Name: Luis Perez  YOB: 1950  Age: 71 y.o. Date of Death: 20  Admitted Date: 2020  Time of Death: 13:40    Facility of Care: [facility]  Level of Care: GIP  Patient Room: Formerly named [facility]     Hospice Attending: Shaheed Patino MD  Hospice Diagnosis: End stage heart failure ([address] [I50.84]    Death Pronouncement   Pronouncement of death completed by: FLEX Diana    Agency staff WAS present at the time of death    At the time of death the patient was documented as:  \" Called to examine patient who has . No response to verbal and tactile stimuli. No respiratory effort. Absent heart sounds and pulses. Pupils fixed and dilated. Patient pronounced dead at 1:40 PM\"    The pt  within [facility]    The following were notified of the patient's death: Darrold Maker; Hospice Staff;  Dr. Navdeep Whiting and Silvana Patino, and family members were all bedside at time of death. Medications were disposed of per facility protocol     Discharge Summary   Discharge Reason: Death    Summary of Care Provided:    [x] Post mortem care provided by [facility] Nursing Staff  [x] Notification of  home by MARIA C BLACK Nurse .  [address]  [x] Referrals/Community resources provided:   [x] Goals completed  [] Durable Medical Equipment vendor notified     Disciplines involved: [x] RN [x] SW [x]  [] CHO [] Vol [] PT [] OT [] ST [] BC    [] IDT communication/notification    Attending Physician, Dr. Navdeep Whiting, notified of death    Bereaved   Verify bereaved identified with name, address, telephone number and risk level      Advance Care Planning 2020   Patient's Healthcare Decision Maker is: -   Confirm Advance Directive Yes, on file     Rashad Taveras RN, [address] Nurse Liaison  [phone] Mobile  [phone] Office

## 2020-08-03 NOTE — HOSPICE
[facility]  [phone]    Discharge/Death Nursing Note   Patient Name: Devin Santos  YOB: 1950  Age: 71 y.o. Date of Death: 20  Admitted Date: 2020  Time of Death: [place]: [facility]  Level of Care: GIP  Patient Room: [facility]     Hospice Attending: Anival Arambula MD  Hospice Diagnosis: End stage heart failure ([address] [I50.84]    Death Pronouncement   Pronouncement of death completed by: Dr. Estrella Julian staff was present at the time of death    At the time of death the patient was documented as no response to verbal and tactile stimuli, no respiratory effort, absent heart sounds and pulses, pupils fixed and dilated.      The pt  within [facility]    The following were notified of the patient's death: , hospice, MD, nursing supervisor     Medications were disposed of per facility protocol- N/A     Discharge Summary   Discharge Reason: Death    Summary of Care Provided:    [x] Post mortem care provided by nursing staff when family is ready to leave bedside   [x] Notification of  home by nursing supervisor when family is ready to leave bedside- [de-identified] FH in [facility]   [] Referrals/Community resources provided:   [] Goals completed  [] Durable Medical Equipment vendor notified     Disciplines involved: [x] RN [x] SW [x]  [] CHO [] Vol [] PT [] OT [] ST [] BC    [x] IDT communication/notification    Attending Physician, Dr. Daniel Manning, notified of death    Bereaved   Wife, Son, and daughter   Advance Care Planning 2020   [facility] is: -   Confirm Advance Directive Yes, on file

## 2020-08-03 NOTE — PROGRESS NOTES
0730 Bedside shift change report given to Sinan Mesa RN (oncoming nurse) by [facility], RN (offgoing nurse). Report included the following information SBAR, Kardex, Intake/Output, MAR and Recent Results. 1010 PRN ativan given. 1230 PCA started. 1313 PCA rate increased per Domi Byers MD.     0166 2mg ativian administered and PCA rate increased per Domi Byers MD.     0465 Pt time of death [phone]. PCA stopped. Family at bedside. 1600 Post mortem cared performed by RN and PCT. Transported to [facility].

## 2020-08-03 NOTE — PROGRESS NOTES
[facility]  Provider Death Note    Called to examine patient who has . No response to verbal and tactile stimuli. No respiratory effort. Absent heart sounds and pulses. Pupils fixed and dilated.    Patient pronounced dead at 1:40 PM

## 2020-08-03 NOTE — DISCHARGE SUMMARY
Hospice Discharge Summary    [place] [address]  [facility]        Date of Admission: 8/2/2020  Date of Discharge: 8/3/20    Selina Barrientos is a 71y.o. year old who was admitted to [place] [address] at [facility] with a Hospice diagnosis of End stage heart failure ([facility] [I50.84].       The patient's care was focused on comfort and the patient passed away on 8/3/20

## 2020-08-03 NOTE — HOSPICE
Hospice Liaison Nurse:    Multiple family members bedside: Wife, Estuardo Mclain, son Mariana Bergman and his wife, Floresita gardiner; daughter, Alejandra Sportsman. Support provided, discussion of Leydi preference. Estuardo Mclain states that she would appreciate having hospice Chaplain available, at time of LVAD changes. Contact made to [facility], Hospice Chaplain. Freeman Craig will be at [facility] within the hour. Family updated as to this plan, and appreciative. Family educated to plan of care; including symptom medications that have been ordered.     Omer Cabot, RN, [place] [address] Nurse Liaison  [phone] [place]  [phone] Office

## 2020-08-03 NOTE — HOSPICE
[facility]  [phone]    [facility] Daily Nursing Note   Patient Name: Zechariah Campos  YOB: 1950  Age: 71 y.o. Date of Visit: 08/03/20  Facility of Care: [facility]  Patient Room: [facility]/01     Hospice Attending: Ajit Cooper MD  Hospice Diagnosis: End stage heart failure ([address] [I50.84]    Level of Care: GIP    Current GIP Symptoms    1. Pain  2. Agitation/restlessness  3. High possibility to develop secretions  4. Decreased responsiveness/delirium  5. Active LVAD, plan is to turn off today      ASSESSMENT & PLAN     1. Continue scheduled IV dilaudid every four hours scheduled. PRN IV dilaudid available every fifteen minutes as needed. Adjust doses if needed. 2. Continue scheduled IV lorazepam every four hours scheduled. PRN IV lorazepam available every 15 minutes as needed. Adjust doses if needed   3. Continue scheduled IV robinul for prevention of secretions. If secretions develop reposition patient to help drain secretions, suction patient if able, do not deep suction patient as this can cause pain and distress to patient. 4. Skilled nursing assessments needed to determine if patient is in distress/pain. 5. Plan is to turn off LVAD today by heart failure team, anticipate patient to need more symptom medications when LVAD turns off. Spiritual Interventions:  to see patient as needed     Psych/ Social/ Emotional Interventions: Family is very supportive and loving with patient, family will need much support as this has been a long difficult road regarding patient's hospitalizations. Family will benefit from our bereavement services.      Care Coordination Needs: Communicate with family, MD, hospital team.     Care plan and New Orders discussed / approved with Dr. Suha Fields MD.    Description History and Chart Review     Narrative History of last 24 hours that demonstrates care cannot be provided in another setting:  Reyes admitted [facility] yesterday for symptom management of agitation and pain. What has been done to control the patient's symptoms in the last 24 hours? Patient's lorazepam, dilaudid, and robinul has been scheduled every four hours. Does the patient currently require IV medications? Yes  Does the patient currently require scheduled medications? Yes  Does the patient currently require a PCA? No    List number of doses of PRN medications in last 24 hours:  Medication 1: Dilaudid 0.5 mg   Number of doses: One    Medication 2: Lorazepam 1 mg   Number of doses: One    Medication 3:   Number of doses:    Supporting documentation for GIP need for pain control:  [x] Frequent evaluation by a doctor, nurse practitioner, nurse   [x] Frequent medication adjustment    [x] IVs that cannot be administered at home   [] Aggressive pain management   [] Complicated technical delivery of medications              Supporting documentation for GIP need for symptom control:  [x]  Sudden decline necessitating intensive nursing intervention  []  Uncontrolled / intractable nausea or vomiting   []  Pathological fractures  []  Advanced open wounds requiring frequent skilled care  [] Unmanageable respiratory distress  [] New or worsening delirium   [] Delirium with behavior issues: Is 24 hour caregiver present due to safety concerns with agitation? (yes/no)  [x] Imminent death - with skilled nursing needs documented above    DISCHARGE PLANNING     1. Discharge Plan: Patient imminent, most likely to pass shortly after LVAD turned off   2. Patient/Family teaching: Yes  3.  Response to patient/family teaching: receptive     ASSESSMENT    KARNOFSKY: 20    Prognosis estimated based on 08/03/20 clinical assessment is:   [] Few to Many Hours  [x] Hours to Days   [] Few to Many Days   [] Days to Weeks   [] Few to Many Weeks   [] Weeks to Months   [] Few to Many Months    Quality Measure: Patient self-reports:  [] Yes    [x] No        CLINICAL INFORMATION     Patient Vitals for the past 12 hrs:   Temp Pulse Resp SpO2   08/03/20 0718 98.4 °F (36.9 °C) 93 15 --   08/03/20 0312 97.8 °F (36.6 °C) 86 14 --   08/02/20 2307 -- 89 16 97 %   08/02/20 2113 -- (!) 110 18 --       Currently this patient has:  [] Supplemental O2   [x] IV    [] PICC      [] PORT   [] NG Tube    [] PEG Tube   [] Ostomy     [x] Lizama draining yellow urine  [] Other:     SIGNS/PHYSICAL FINDINGS     Skin (including wound):  [x] Warm, dry, supple, intact and color normal for race  [] Warm   [] Dry   [] Cool     [] Clammy       [] Diaphoretic    Turgor   [] Normal   [x] Decreased  Color:   [] Pink   [] Pale   [] Cyanotic   [] Erythema   [] Jaundice   [x] Normal for Race  []  Wounds:    Neuro:  [x] Lethargy  [] Restlessness / agitation  [x] Confusion / delirium  [] Hallucinations  [] Responds to maximal stimulation  [] Unresponsive  [] Seizures     Cardiac:  [] Dyspnea on Exertion  [] JVD  [] Murmur  [] Palpitations  [] Hypotension  [] Hypertension  [] Tachycardia  [] Bradycardia  [] Irregular HR  [] Pulses Decreased  [] Pulses Absent  [] Edema:   None noted  [] Mottling:     None noted     Respiratory:  Breath sounds:    [x] Diminished   [] Wheeze   [] Rhonchi   [] Rales   [x] Even and unlabored  [] Labored:            [] Cough   [] Non Productive   [] Productive    [] Description:           [] Deep suctioned   [] O2 at ___ LPM  [] High flow oxygen greater than 10 LPM  [] Bi-Pap    GI  [x] Abdomen is flat and soft    [] Ascites  [] Nausea  [] Vomiting  [] Incontinent of bowels  [x] Bowel sounds yes  [] Diarrhea  [] Constipation (see above including last bowel movement)  [] Checked for impaction  [] Last BM    Nutrition  Diet:comfort  Appetite:   [] Good   [] Fair   [x] Poor   [] Tube Feeding       [] Voiding  [] Incontinent   [x] Lizama    Musculoskeletal  [] Balance/[place] Unsteady   [x] Weak   Strength:    [] Normal    [] Limited    [x] Decreasing   Activities:    [] Up as tolerated   [x] Bedridden    [] Specify:    SAFETY  [] 24 hr. Caregiver   [x] Side rails ? [x] Hospital bed   [] Reviewed Falls & Safety       ALLERGIES AND MEDICATIONS     Allergies:    Allergies   Allergen Reactions    Cefepime Other (comments)     myoclonus       Current Facility-Administered Medications   Medication Dose Route Frequency    haloperidol lactate (HALDOL) injection 2 mg  2 mg IntraVENous Q6H PRN    acetaminophen (TYLENOL) suppository 650 mg  650 mg Rectal Q4H PRN    bisacodyL (DULCOLAX) suppository 10 mg  10 mg Rectal DAILY PRN    HYDROmorphone (PF) (DILAUDID) injection 1 mg  1 mg IntraVENous Q15MIN PRN    furosemide (LASIX) injection 40 mg  40 mg IntraVENous BID PRN    LORazepam (ATIVAN) injection 1 mg  1 mg IntraVENous Q15MIN PRN    glycopyrrolate (ROBINUL) injection 0.2 mg  0.2 mg IntraVENous Q4H    LORazepam (ATIVAN) injection 4 mg  4 mg IntraVENous Q5MIN PRN    HYDROmorphone (PF) (DILAUDID) injection 1 mg  1 mg IntraVENous Q4H    LORazepam (ATIVAN) injection 1 mg  1 mg IntraVENous Q4H          Visit Time In: 1136  Visit Time Out: 4361

## 2020-08-03 NOTE — HOSPICE
0725 Checked on patient, grimacing slightly. Patient given scheduled dilaudid, lorazepam, and robinul at this time. Will check back on patient shortly. 0805 Re-checked on patient, patient resting in bed quietly, respirations are even and unlabored, neutral facial expression. Daughter and wife at the bedside.

## 2020-08-04 LAB
ABO + RH BLD: NORMAL
ANTIGENS PRESENT RBC DONR: NORMAL
ANTIGENS PRESENT RBC DONR: NORMAL
BACTERIA SPEC CULT: NORMAL
BLD PROD TYP BPU: NORMAL
BLD PROD TYP BPU: NORMAL
BLOOD BANK CMNT PATIENT-IMP: NORMAL
BLOOD GROUP ANTIBODIES SERPL: NORMAL
BLOOD GROUP ANTIBODIES SERPL: NORMAL
BPU ID: NORMAL
BPU ID: NORMAL
CROSSMATCH RESULT,%XM: NORMAL
CROSSMATCH RESULT,%XM: NORMAL
SERVICE CMNT-IMP: NORMAL
SPECIMEN EXP DATE BLD: NORMAL
STATUS OF UNIT,%ST: NORMAL
STATUS OF UNIT,%ST: NORMAL
UNIT DIVISION, %UDIV: 0
UNIT DIVISION, %UDIV: 0

## 2020-12-14 NOTE — PROGRESS NOTES
Cr stable [place] plans did not work Topical Clindamycin Pregnancy And Lactation Text: This medication is Pregnancy Category B and is considered safe during pregnancy. It is unknown if it is excreted in breast milk.

## 2021-04-12 NOTE — PROGRESS NOTES
1715- TRANSFER - IN REPORT: 
 
Verbal report received from Debby Santizo (name) on Prince Carias  being received from [facility](unit) for routine progression of care Report consisted of patients Situation, Background, Assessment and  
Recommendations(SBAR). Information from the following report(s) SBAR, Kardex and MAR was reviewed with the receiving nurse. Opportunity for questions and clarification was provided. Assessment completed upon patients arrival to unit and care assumed. Primary Nurse Raz Souza RN and Kennedy Camarillo RN performed a dual skin assessment on this patient No impairment noted Current Bed:  
[facility] SPORT (air with burgundy cover) Mich score is 23 
 
 
 
 
 
1730- At bedside with patient. Dr. Fiona Sanders at the bedside visiting with patient and family. Orders are to watch for post void residual. 
1805- Patient transported off [facility] to CT scan. Scan obtained without issue. 1815- Patient now on CCU. Reports he has to void now. Pre-void bladder scan shows 470 ml of urine. Patient voids 200 ml. Post void bladder scan shows 300 residual. Called results to Dr. Fiona Sanders. Orders for neal catheter. 1830- Neal place. 250 ml of urine obtained. 1930- Report given to [facility]. Contacted Dr. Jed Singh. Patient will get a PA catheter tomorrow. [address] with Dr. Jed Singh. Updated on K level and current BP. Orders are to hold the Coreg. She will put in further orders. no

## 2021-09-17 NOTE — PROGRESS NOTES
0730: Bedside shift change report given to Alex Youngbloodenzo Dominguez (oncoming nurse) by Sincere Gray RN (offgoing nurse). Report included the following information SBAR and Kardex. 1220: patient off the floor to PET scan. Patient transported with LVAD coordinator. 1330: patient back from PET scan 
 
1930: Bedside shift change report given to [address] Gerard Garrison (oncoming nurse) by [address] RN (offgoing nurse). Report included the following information SBAR and Kardex. Problem: Falls - Risk of 
Goal: *Absence of Falls Description Document Pierre Layton Fall Risk and appropriate interventions in the flowsheet. Outcome: Progressing Towards Goal 
Note: Fall Risk Interventions: 
Mobility Interventions: Communicate number of staff needed for ambulation/transfer, Bed/chair exit alarm Mentation Interventions: Adequate sleep, hydration, pain control, Bed/chair exit alarm Medication Interventions: Assess postural VS orthostatic hypotension, Bed/chair exit alarm, Teach patient to arise slowly, Patient to call before getting OOB Elimination Interventions: Call light in reach History of Falls Interventions: Door open when patient unattended Problem: Pressure Injury - Risk of 
Goal: *Prevention of pressure injury Description Document Mich Scale and appropriate interventions in the flowsheet. Offload heels Turn approximately every 2 hours Outcome: Progressing Towards Goal 
Note: Pressure Injury Interventions: 
Sensory Interventions: Assess changes in LOC, Keep linens dry and wrinkle-free Moisture Interventions: Check for incontinence Q2 hours and as needed, Absorbent underpads, Internal/External urinary devices Activity Interventions: Chair cushion, Increase time out of bed Mobility Interventions: Pressure redistribution bed/mattress (bed type) Nutrition Interventions: Document food/fluid/supplement intake Friction and Shear Interventions: Lift sheet, Minimize layers Need more information regarding this call.   Is a patient running a fever is she having a chest congestion, has she been exposed to COVID-19 etc. Problem: Patient Education: Go to Patient Education Activity Goal: Patient/Family Education Outcome: Progressing Towards Goal 
  
Problem: Heart Failure: Discharge Outcomes Goal: *Demonstrates ability to perform prescribed activity without shortness of breath or discomfort Outcome: Progressing Towards Goal

## 2022-02-28 NOTE — PROGRESS NOTES
ADULT PROTOCOL: JET AEROSOL  REASSESSMENT Patient  Arianna Wynne     71 y.o.   male     12/21/2019  10:01 PM 
 
Breath Sounds Pre Procedure: Right Breath Sounds: Coarse, Diminished Left Breath Sounds: Coarse, Diminished Breath Sounds Post Procedure: Right Breath Sounds: Diminished Left Breath Sounds: Diminished Breathing pattern: Pre procedure Breathing Pattern: Regular Post procedure Breathing Pattern: Regular Heart Rate: Pre procedure Pulse: 80 
         Post procedure Pulse: 83 Resp Rate: Pre procedure Respirations: 20 
         Post procedure Respirations: 30 Incentive Spirometry:  Actual Volume (ml): 1000 ml Cough: Pre procedure Cough: Non-productive Post procedure Cough: Non-productive Suctioned:  
 
Sputum: Pre procedure Sputum amount: Scant Sputum color/odor: Clear Post procedure Oxygen: O2 Device: Nasal cannula  3L Changed:  
 
SpO2: Pre procedure SpO2: 100 % Post procedure SpO2: 99 % Nebulizer Therapy: Current medications Duoneb every 6 hours Changed: Duoneb every 6 hours as needed Smoking History: no 
 
Problem List:  
Patient Active Problem List  
Diagnosis Code  Paroxysmal atrial fibrillation (HCC) I48.0  Acute on chronic systolic CHF (congestive heart failure) (HCC) I50.23  Systolic CHF, chronic (HCC) I50.22  
 Peripheral vascular disease (HCC) I73.9  Acute decompensated heart failure (HCC) I50.9 Respiratory Therapist: Cornelio Patino RT 
 18

## 2022-03-29 NOTE — PROGRESS NOTES
[address] [place] [address] in [facility]  Inpatient Progress Note      Patient name: Donny Shaikh  Patient : 1950  Patient MRN: [id number]  Attending MD: Verónica Sevilla MD  Date of service: 20    Chief Complaint:   Regina Iggy azucnea LVAD implant     HPI: Sona Kiser is a 71y.o. year old pleasant white male with a history of HTN, HLD, JOSE, CAD s/p cardiac arrest VFib s/p CABG () c/b sternal would infection and sternectomy, ischemic cardiomyopathy LVEF 15-20%, s/p ICD and with LBBB. He was admitted with acute on chronic systolic heart failure with massive volume overload > 20 lbs, in the setting of atrial fibrillation s/p failed DCCV and underwent DCCV and RHC on .  S/p BiVICD on 19 with Dr. Becky Yao. He was discharged home home on IV milrinone on 19. Maricarmen Jerry has been followed closely by Dr. Haven Rosen and the [facility].     Mr. Kiser was admitted for acute on chronic systolic heart failure. He underwent implantation of Impella 5.0 due to CS and has completed his LVAD evaluation. Maricarmen Jerry meets criteria for implant of HM3 as DT. He was NYHA Class IV prior to implant of Impella 5.0, has LVEF < 15%, was intolerant of GDMT due to symptomatic hypotension and renal dysfunction. He remains dependent on temporary MCS support. RHC  revealed compensated hemodynamics on Impella. His renal function has improved dramatically with improvement in his hemodynamics. He is not a suitable transplant candidate due to single kidney, sternectomy, debility, and frailty. He was reviewed by Sonny Jansen and felt to be a high risk heart transplant candidate due to multiple co-morbidities as well as the afore mentioned conditions.  He remained in the CCU and underwent a HM 3 implant as DT on .   He was weaned off of pressors and transferred to [place] [address] where he continues to undergo PT/OT and hemodynamic optimization.       24Hr Events:   Syncopal today   MAPs dropped to 64  INR 1.8  Fatigued from therapy     Procedure:  Procedure(s):  REMOVAL TEMPORARY LEFT VENTRICULAR ASSIST DEVICE, IMPLANTATION OF LEFT VENTRICULAR ASSIST DEVICE PERMANENT (VAD), ECC, JACQUE, EPI AORTIC US BY DR Olaf Song.     POD:  53     Impression / Plan:   Heart Failure Status: NYHA Class IV, improved to NYHA Class III    Chronic systolic heart failure due to ICM, NYHA Class IV, EF < 15%, cardiogenic shock bridged with Impella 5.0 support to HM 3 implant   S/p Impella removal and LVAD implant 11/18/19  RPMs stable at 6600   Multiple PI events on interrogation - but fewer than yesterday   Continue Sildenafil 20 mg PO TID  Intolerant of BB due to RV failure  Intolerant of ACEi/ARB/ARNI/AA due to JUAN J on CKD 3  Intolerant of spironolactone d/t IVVD   Continue midodrine 5 mg po TID for orthostatic hypotension  Strict I/O, daily weights  Continue LVAD education   Dressing change frequency three times weekly, sutures removed 12/26/19    Bacteremia - Pseudomonas  Blood cultures (12/31/19) 2/4 bottles +Pseudomonas & 2/4 bottles GNRs    Repeat BC NGTD  On IV Cefepime and Levaquin   Follow procalcitonin and lactic acid levels - both improving    Orthostatic Hypotension  Midodrine 5 mg PO TID  increase Florinef 0.1 mg to BID  Stim test unremarkable    Generalized myoclonus   Improved   Appreciate Neurology input  Discontinued Keppra due to dizziness   Head CT negative for acute process  EEG suggestive of mild generalized encephalopathic process, possibly related to underlying structural brain injury vs metabolic abnormality  Monitor closely      Anticoagulation for LVAD, INR goal 1.5-2  Goal decreased d/t persistent hematuria   No ASA d/t hematuria  INR 1.8  Coumadin - 4 mg tonight    Epistaxis  Resolved      Acute Respiratory Failure post op  Improved with aggressive diuresis  Off supplemental O2  Pulmonary hygiene   Cont home CPAP- must use while sleeping - will have hospital RT adjust mask/settings to improve patient compliance   Schedule PET CT prior to discharge - ordered for Monday 1/13    Acute on CKD 3, atrophic left kidney  Appreciate Nephrology assistance  Watch Cr, stable  Hold diuretics   Avoid nephrotoxins, trend labs      CAD s/p CABG   Off ASA d/t hematuria  No BB d/t RV failure  Hold statin due to recent hepatic failure   LHC performed 11/13 - low likelihood of benefit from revascularization      Hx of VF arrest s/p BiV-ICD  No recent shocks  Keep K > 4 and Mg > 2   Mag ox 800 mg po BID  ICD reprogrammed to reduce diaphragmatic stimulation     PAF   Dig level 0.5-cont dig (62.5 mcg qMWF)  No BB due to RV failure   Repeat TFTs WNL  Interrogate ICD to eval AF burden      Hx of gout  Uric acid WNL    Acute blood loss anemia  hgb stable  Likely due to hematuria  Cont octreotide 25 mcg SQ TID   Fecal occult 12/14 negative, positive on 12/17  Appreciate urology recommendations  Hematuria improved  Monitor for now     Urinary retention, c/b serratia UTI and hematuria  Appreciate Urology consult   Repeat UA with cx negative 12/15   Cystoscopy performed 11/13 shows catheter induced cystitis   Pseudomonas aeruginosa positive UA culture (12/31/19) - on Cefepmine and levaquin     Malnutrition   Eating better  Prealbumin weekly - 13.3 on 1/5  Appreciate Nutritionist assistance  Diet as tolerated, encourage food from home, protein shakes  Intolerant of mirtazapine d/t tremors, confusion   Cont MVI add thiamine 100mg daily   Hold on DHT placement for now     COPD   Appreciate Pulmonology assistance   Continue nebs PRN       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 Angeles Odell, will continue IV abx and wet to dry dressings.   Will likely require sternal wire after ~3 months from op date     Vocal cord paralysis  Improved  ENT recs appreciated  Speech therapy following - appreciate input    Anxiety  Change Klonopin to 0.5 mg PO qHS    Depression  Cont Effexor to 150 mg PO qAM   Monitor rhythm strips for prolonged QTc     Debility  Continue PT/OT     Bladder spasms  Received pyridium 100mg x4 doses  Oxybutynin 5mg Q6hr prn      Ppx  Protonix   PT/OT   +daily BM   PICC placed 11/22/19      Dispo:  Remain in CVSU at this time  Will need IP rehab. Not ready for discharge at this time        LVAD INTERROGATION:  Device interrogated in person  Device function normal, normal flow, multiple PI events    LVAD   Pump Speed (RPM): 6600  Pump Flow (LPM): 4.3  MAP: 64  PI (Pulsitility Index): 9.1  Power: 5.7   Test: Yes  Back Up  at Bedside & Labeled: Yes  Power Module Test: Yes  Driveline Site Care: No  Driveline Dressing: Clean, Dry, and Intact  Outpatient: No  MAP in Therapeutic Range (Outpatient): Yes  Testing  Alarms Reviewed: Yes  Back up SC speed: 6600  Back up Low Speed Limit: 6000  Emergency Equipment with Patient?: Yes  Emergency procedures reviewed?: Yes  Drive line site inspected?: Yes  Drive line intergrity inspected?: Yes  Drive line dressing changed?: No    PHYSICAL EXAM:  Visit Vitals  BP 91/72 (BP 1 Location: Left arm, BP Patient Position: At rest)   Pulse 92   Temp 97.7 °F (36.5 °C)   Resp 18   Ht 6' 2\" (1.88 m)   Wt 171 lb 15.3 oz (78 kg)   SpO2 96%   BMI 22.08 kg/m²       Physical Exam   Constitutional: He is oriented to person, place, and time. He appears well-developed. He appears cachectic. No distress. HENT:   Head: Normocephalic. Neck: Normal range of motion. Neck supple. No JVD present. Cardiovascular: Normal rate, regular rhythm and normal heart sounds. + VAD hum    Pulmonary/Chest: Effort normal and breath sounds normal. No tachypnea. No respiratory distress. Abdominal: Soft. Bowel sounds are normal. He exhibits no distension.    Musculoskeletal: Normal range of motion. General: No edema. Neurological: He is alert and oriented to person, place, and time. Skin: Skin is warm and dry. Psychiatric: He has a normal mood and affect. His speech is normal and behavior is normal.   ~1 cm x 1 cm x 0.25 cm area of delayed closure on sternotomy. Site clean, no erythema or drainage noted. Subcutaneous tissue noted. Nursing note and vitals reviewed. REVIEW OF SYSTEMS:  Review of Systems   Constitutional: Positive for malaise/fatigue. Negative for chills and fever. HENT: Positive for hearing loss. Negative for nosebleeds. Eyes: Negative. Respiratory: Negative for cough and shortness of breath. Mild dyspnea   Cardiovascular: Negative for chest pain, palpitations, orthopnea and leg swelling. Orthostatic   Gastrointestinal: Negative for heartburn, nausea and vomiting. Genitourinary: Negative for dysuria and hematuria. Musculoskeletal: Negative. Neurological: Positive for dizziness and weakness. Negative for headaches. Mild dizziness - improving    Endo/Heme/Allergies: Does not bruise/bleed easily.        PAST MEDICAL HISTORY:  Past Medical History:   Diagnosis Date    Degenerative disc disease, lumbar     Heart failure ([address]     High cholesterol     Hypertension     Paroxysmal atrial fibrillation ([address] 4/2/2019    Spinal stenosis        PAST SURGICAL HISTORY:  Past Surgical History:   Procedure Laterality Date    COLONOSCOPY Left 11/11/2019    COLONOSCOPY at bedside performed by Sid Ray MD at [address] HX APPENDECTOMY      HX CORONARY ARTERY BYPASS GRAFT      triple    HX HERNIA REPAIR      HX IMPLANTABLE CARDIOVERTER DEFIBRILLATOR      MI CARDIOVERSION ELECTIVE ARRHYTHMIA EXTERNAL N/A 6/10/2019    EP CARDIOVERSION performed by Louann Valdivia MD at [address] CATH LAB    MI CARDIOVERSION ELECTIVE ARRHYTHMIA EXTERNAL N/A 6/18/2019    EP CARDIOVERSION performed by Bossman Reid, MD at [facility] CARDIAC CATH LAB    MN INSJ/RPLCMT PERM DFB W/TRNSVNS LDS 1/DUAL CHMBR N/A 2019    INSERT ICD BIV MULTI performed by Oren Kendrick MD at [address] CATH LAB    MN TCAT IMPL WRLS P-ART PRS SNR L-T HEMODYN MNTR N/A 2019    IMPLANT HEART FAILURE MONITORING DEVICE performed by Lisandra Fonseca MD at [address] CATH LAB       FAMILY HISTORY:  Family History   Problem Relation Age of Onset    Lung Disease Mother     Hypertension Mother     Arthritis-osteo Mother     Heart Disease Mother     Heart Disease Father     Diabetes Father        SOCIAL HISTORY:  Social History     Socioeconomic History    Marital status:      Spouse name: Not on file    Number of children: Not on file    Years of education: Not on file    Highest education level: Not on file   Tobacco Use    Smoking status: Former Smoker     Last attempt to quit: 2010     Years since quittin.1    Smokeless tobacco: Never Used   Substance and Sexual Activity    Alcohol use: Not Currently     Comment: rarely    Drug use: Never   Other Topics Concern       LABORATORY RESULTS:     Labs Latest Ref Rng & Units 2020 1/10/2020 2020 2020 2020 2020 2020   WBC 4.1 - 11.1 K/uL 5.4 4.9 5.7 5.3 6.4 6.8 6.2   RBC 4.10 - 5.70 M/uL 3.08(L) 2.80(L) 3.09(L) 3.15(L) 2.99(L) 2.98(L) 3.10(L)   Hemoglobin 12.1 - 17.0 g/dL 9. 0(L) 8.2(L) 9.1(L) 9.2(L) 8.7(L) 8.5(L) 8. 9(L)   Hematocrit 36.6 - 50.3 % 30. 0(L) 27. 2(L) 29. 3(L) 29. 9(L) 28. 4(L) 28. 3(L) 29. 3(L)   MCV 80.0 - 99.0 FL 97.4 97.1 94.8 94.9 95.0 95.0 94.5   Platelets 630 - 063 K/uL 127(L) 123(L) 138(L) 139(L) 138(L) 123(L) 127(L)   Lymphocytes 12 - 49 % - - - - - - -   Monocytes 5 - 13 % - - - - - - -   Eosinophils 0 - 7 % - - - - - - -   Basophils 0 - 1 % - - - - - - -   Albumin 3.5 - 5.0 g/dL 2. 3(L) 2. 1(L) 2. 4(L) 2. 4(L) 2. 3(L) 2. 5(L) 2. 4(L)   Calcium 8.5 - 10.1 MG/DL 8.8 7.8(L) 9.1 8.9 8.7 8.9 9.0   SGOT 15 - 37 U/L 18 18 21 18 18 18 19   Glucose 65 - 100 mg/dL 99 99 103(H) 108(H) 114(H) 104(H) 102(H)   BUN 6 - 20 MG/DL 23(H) 19 21(H) 23(H) 26(H) 23(H) 21(H)   Creatinine 0.70 - 1.30 MG/DL 0.96 0.88 1.11 1.08 1.04 1.11 1.09   Sodium 136 - 145 mmol/L 136 136 133(L) 133(L) 130(L) 131(L) 130(L)   Potassium 3.5 - 5.1 mmol/L 3.9 3.9 4.3 4.6 4.8 4.9 4.6   TSH 0.36 - 3.74 uIU/mL - - - - - - -   LDH 85 - 241 U/L 206 186 203 198 202 186 185   Some recent data might be hidden     Lab Results   Component Value Date/Time    TSH 2.30 12/03/2019 03:29 AM    TSH 2.40 10/25/2019 07:39 PM    TSH 2.45 06/01/2019 04:16 AM       ALLERGY:  No Known Allergies     CURRENT MEDICATIONS:  Current Facility-Administered Medications   Medication Dose Route Frequency    fludrocortisone (FLORINEF) tablet 0.1 mg  0.1 mg Oral BID    cholecalciferol (VITAMIN D3) (1000 Units /25 mcg) tablet 2 Tab  2,000 Units Oral DAILY    clonazePAM (KlonoPIN) tablet 0.5 mg  0.5 mg Oral QHS    venlafaxine-SR (EFFEXOR-XR) capsule 150 mg  150 mg Oral DAILY WITH BREAKFAST    midodrine (PROAMITINE) tablet 5 mg  5 mg Oral TID WITH MEALS    hydrocortisone (CORTAID) 1 % cream   Topical TID PRN    sildenafil (pulm.hypertension) (REVATIO) tablet 20 mg  20 mg Oral TID    thiamine HCL (B-1) tablet 100 mg  100 mg Oral DAILY    levoFLOXacin (LEVAQUIN) 750 mg in D5W IVPB  750 mg IntraVENous Q24H    cefepime (MAXIPIME) 2 g in 0.9% sodium chloride (MBP/ADV) 100 mL  2 g IntraVENous Q8H    oxybutynin (DITROPAN) tablet 5 mg  5 mg Oral Q6H PRN    magnesium oxide (MAG-OX) tablet 800 mg  800 mg Oral BID    lidocaine (URO-JET/ GLYDO) 2 % jelly   Urethral PRN    epoetin yvonne-epbx (RETACRIT) injection 20,000 Units  20,000 Units SubCUTAneous Q MON, WED & FRI    albuterol-ipratropium (DUO-NEB) 2.5 MG-0.5 MG/3 ML  3 mL Nebulization Q6H PRN    therapeutic multivitamin (THERAGRAN) tablet 1 Tab  1 Tab Oral DAILY    alteplase (CATHFLO) 1 mg in sterile water (preservative free) 1 mL injection  1 mg InterCATHeter PRN    finasteride (PROSCAR) tablet 5 mg  5 mg Oral DAILY    diphenhydrAMINE (BENADRYL) capsule 25 mg  25 mg Oral QHS PRN    octreotide (SANDOSTATIN) injection 25 mcg  25 mcg SubCUTAneous TID    digoxin (LANOXIN) tablet 0.0625 mg  62.5 mcg Oral Q MON, WED & FRI    pantoprazole (PROTONIX) tablet 40 mg  40 mg Oral ACB    allopurinol (ZYLOPRIM) tablet 100 mg  100 mg Oral DAILY    arformoterol (BROVANA) neb solution 15 mcg  15 mcg Nebulization BID RT    And    budesonide (PULMICORT) 500 mcg/2 ml nebulizer suspension  500 mcg Nebulization BID RT    lactobac ac& pc-s.therm-b.anim (TIAN Q/RISAQUAD)  1 Cap Oral DAILY    oxyCODONE IR (ROXICODONE) tablet 5 mg  5 mg Oral Q6H PRN    tamsulosin (FLOMAX) capsule 0.4 mg  0.4 mg Oral DAILY    Warfarin MD/NP dosing   Other PRN    sodium chloride (NS) flush 5-40 mL  5-40 mL IntraVENous Q8H    sodium chloride (NS) flush 5-40 mL  5-40 mL IntraVENous PRN    acetaminophen (TYLENOL) tablet 650 mg  650 mg Oral Q6H PRN    naloxone (NARCAN) injection 0.4 mg  0.4 mg IntraVENous PRN    ondansetron (ZOFRAN) injection 4 mg  4 mg IntraVENous Q4H PRN    sucralfate (CARAFATE) tablet 1 g  1 g Oral AC&HS    influenza vaccine 2019-20 (6 mos+)(PF) (FLUARIX/FLULAVAL/FLUZONE QUAD) injection 0.5 mL  0.5 mL IntraMUSCular PRIOR TO DISCHARGE    hydrALAZINE (APRESOLINE) 20 mg/mL injection 20 mg  20 mg IntraVENous Q6H PRN         Thank you for allowing me to participate in this patient's care.     Santy Pulliam NP  [address] [place] [address], [address]  Phone: [phone]  Fax: [phone] Performed

## 2022-06-21 NOTE — PROGRESS NOTES
Spiritual Care Assessment/Progress Note [address] 
 
 
NAME: Anya Bingham      MRN: [id number] AGE: 71 y.o. SEX: male Jainism Affiliation:   
Language: English  
 
10/28/2019 Spiritual Assessment begun in [facility] CORON[facility] C[facility] through conversation with: 
  
    [x]Patient        [x] Family    [] Friend(s) Reason for Consult: Initial/Spiritual assessment, critical care Spiritual beliefs: (Please include comment if needed) [x] Identifies with a jessie tradition: [address]    
   [x] Supported by a jessie community:        
   [] Claims no spiritual orientation:       
   [] Seeking spiritual identity:            
   [] Adheres to an individual form of spirituality:       
   [] Not able to assess:                   
 
    
Identified resources for coping:  
   [x] Prayer                           
   [] Music                  [] Guided Imagery [x] Family/friends                 [] Pet visits [] Devotional reading                         [] Unknown 
   [] Other:                                          
 
 
Interventions offered during this visit: (See comments for more details) Patient Interventions: Affirmation of emotions/emotional suffering, Catharsis/review of pertinent events in supportive environment, Initial/Spiritual assessment, Critical care, Iconic (affirming the presence of God/Higher Power), Normalization of emotional/spiritual concerns, Prayer (actual), Prayer (assurance of) Family/Friend(s): Affirmation of emotions/emotional suffering, Catharsis/review of pertinent events in supportive environment, Iconic (affirming the presence of God/Higher Power), Initial Assessment, Prayer (actual), Prayer (assurance of) Plan of Care: 
 
 [x] Support spiritual and/or cultural needs  
 [] Support AMD and/or advance care planning process    
 [] Support grieving process 
 [] Coordinate Rites and/or Rituals [] Coordination with community clergy [] No spiritual needs identified at this time 
 [] Detailed Plan of Care below (See Comments)  [] Make referral to Music Therapy 
[] Make referral to Pet Therapy    
[] Make referral to Addiction services 
[] Make referral to [facility] 
[] Make referral to Spiritual Care Partner 
[] No future visits requested       
[] Follow up visits as needed Comments: Visited Mr Salomón Glynn in [address] for initial spiritual assessment. Mr Salomón Glynn was sitting up in bed and appeared in fair spirits; patient's wife was at his bedside. Patient remembered  from previous hospitalization. Provided active listening as patient shared about his health issues; stated that he had had a very difficult night last night. Wife stated they were waiting for some test results before having to make further decisions about his POC. Acknowledged patient and wife's feelings of apprehension and worry. Mr Salomón Glynn said that they were members of [facility] [address]; said their  had visited since his admission & jessie community was keeping them in prayer. With their permission, had prayer on behalf of patient and family and reassured them of ongoing  availability for support. : Rev. Nelson Lopez; [facility], to contact [phone] call: [phone]

## 2022-06-24 NOTE — DISCHARGE SUMMARY
Discharge Summary PATIENT ID: Marycruz Rendon MRN: [id number] YOB: 1950 DATE OF ADMISSION: 7/2/2020  6:45 PM   
DATE OF DISCHARGE: 7/8/2020 2pm 
PRIMARY CARE PROVIDER: Bekah Gallo MD  
 
ATTENDING PHYSICIAN: Brooke Crawford DISCHARGING PROVIDER: Luis Cummings MD   
To contact this individual call [phone] and ask the  to page. If unavailable ask to be transferred the Adult Hospitalist Department. CONSULTATIONS: IP CONSULT TO ADVANCED HEART FAILURE 
IP CONSULT TO UROLOGY 
IP CONSULT TO OTOLARYNGOLOGY PROCEDURES/SURGERIES: * No surgery found * ADMITTING DIAGNOSES & HOSPITAL COURSE:  
Sona Kiser is a 71 y. o. male past medical history significant for nonischemic cardiomyopathy, congestive heart failure with LVAD presented emergency room complaining of blood in the urine.  Patient states that for the last 3 weeks ever noticed some blood in his urine but has worsened in the last few days. Blake Wells is currently on Coumadin and reports compliant with medication. Blake Wells had blood test ordered by the heart failure clinic and he was found to have a hemoglobin of 6. DISCHARGE DIAGNOSES / PLAN:   
 
1. Acute on chronic anemia: due to acute hematuria 
- transfused 2 units of PRBC on 7/2/20  
- Monitor Hb- 8.8 today and appears stable 
  
2. Hematuria POA: in the setting of anticoagulation however INR was only 1.2. Hematuria  due to both acute cystitis and bladder trauma from neal insertion - Urology consulted-  CT with IVP done today showing cystitis and no kidney issues  
- CBI stopped and neal removed, holding anticoagulation on DC 
  
3. Chronic UTI: on chronic cipro as per ID recommendations. Now with hematuria but no other sign of infection 
- urine culture was negative - will continue current therapy 
  
4.  ICM - Stage D,  LVEF 15%, NYHA Class IV improved to NYHA Class III s/p HM3 LVAD  
- Heart failure consult to assist with LVAD Heart failure team adjusting meds   
 5. PAF 
-back on digoxin 
 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 Subtherapeutic INR but now on hold due to hematuria 
  
6. BPH 
- continue finasteride and tamsulosin 
  
7.  JOSE: holding off on cpap and resumed nocturnal oxygen Stable SO2 levels overnight ADDITIONAL CARE RECOMMENDATIONS:  
 Regarding Hematuria (blood in your urine) - this appears to have resolved. Continue to monitor at home Stop aspirin and warfarin/coumadin F/U with Dr Giovanni Pruitt - urology- as scheduled. Follow up with the advanced heart failure team on Monday of next week PENDING TEST RESULTS:  
At the time of discharge the following test results are still pending: none FOLLOW UP APPOINTMENTS:   
Follow-up Information Follow up With Specialties Details Why Contact [address]  On 7/13/2020 for hospital follow up with Fahad Robbins NP at 10:45am [address] [place] [address], [address] [place] [id number] 
[phone] Cordell Stauffer MD Urology Schedule an appointment as soon as possible for a visit in 2 weeks  [address] 
[phone] Farhad Mack MD [facility]   [id number] [facility] 97 
[phone] DIET: Heart healthy ACTIVITY: Activity as tolerated WOUND CARE: NA 
 
EQUIPMENT needed: LVAD pump DISCHARGE MEDICATIONS: 
Current Discharge Medication List  
  
CONTINUE these medications which have NOT CHANGED Details  
budesonide (PULMICORT) 0.5 mg/2 mL nbsp 500 mcg by Nebulization route nightly. tadalafiL (CIALIS) 2.5 mg tablet Take 2.5 mg by mouth nightly. digoxin (LANOXIN) 0.125 mg tablet Take 1 Tab by mouth every other day. Qty: 90 Tab, Refills: 2  
  
magnesium oxide (MAG-OX) 400 mg tablet Take 2 Tabs by mouth two (2) times a day. Qty: 360 Tab, Refills: 3  
  
ciprofloxacin HCl (CIPRO) 750 mg tablet Take 1 Tab by mouth two (2) times a day. Qty: 60 Tab, Refills: 3  
  
tamsulosin (Flomax) 0.4 mg capsule Take 0.8 mg by mouth nightly. gentamicin (GARAMYCIN) 0.1 % topical ointment Apply to exit site daily. Qty: 30 g, Refills: 0  
  
venlafaxine-SR (EFFEXOR-XR) 150 mg capsule Take 1 Cap by mouth daily (with breakfast). Qty: 90 Cap, Refills: 3  
  
levETIRAcetam (KEPPRA) 250 mg tablet Take 1 Tab by mouth two (2) times a day. Qty: 180 Tab, Refills: 3  
  
L. acidoph & paracasei- S therm- Bifido (TIAN-Q/RISAQUAD) 8 billion cell cap cap Take 1 Cap by mouth daily. Qty: 90 Cap, Refills: 3  
  
finasteride (PROSCAR) 5 mg tablet Take 1 Tab by mouth daily. Qty: 90 Tab, Refills: 3  
  
albuterol (PROVENTIL HFA, VENTOLIN HFA, PROAIR HFA) 90 mcg/actuation inhaler Take 2 Puffs by inhalation every six (6) hours as needed for Wheezing. Qty: 1 Inhaler, Refills: 3 STOP taking these medications  
  
 warfarin (COUMADIN) 5 mg tablet Comments:  
Reason for Stopping:   
   
 amoxicillin-clavulanate (AUGMENTIN) 875-125 mg per tablet Comments:  
Reason for Stopping:   
   
 aspirin delayed-release 81 mg tablet Comments:  
Reason for Stopping:   
   
 clonazePAM (KlonoPIN) 0.5 mg tablet Comments:  
Reason for Stopping:   
   
 warfarin (COUMADIN) 1 mg tablet Comments:  
Reason for Stopping:   
   
 warfarin (COUMADIN) 3 mg tablet Comments:  
Reason for Stopping:   
   
  
 
 
 
NOTIFY YOUR PHYSICIAN FOR ANY OF THE FOLLOWING:  
Fever over 101 degrees for 24 hours. Chest pain, shortness of breath, fever, chills, nausea, vomiting, diarrhea, change in mentation, falling, weakness, bleeding. Severe pain or pain not relieved by medications. Or, any other signs or symptoms that you may have questions about. DISPOSITION: 
  Home With: 
 OT  PT  HH  RN  
  
 Long term SNF/Inpatient Rehab X Independent living Hospice Other:  
 
 
PATIENT CONDITION AT DISCHARGE:  
 
Functional status Poor Deconditioned X Independent Cognition X  Lucid Forgetful Dementia Catheters/lines (plus indication) Lizama PICC   
 PEG   
X None Code status X  Full code DNR   
 
PHYSICAL EXAMINATION AT DISCHARGE: 
Patient Vitals for the past 24 hrs: 
 Temp Pulse Resp SpO2  
07/08/20 1110 98.1 °F (36.7 °C) 80 20 99 % 07/08/20 0726 98.1 °F (36.7 °C) 81 18 99 % 07/08/20 0335 98.2 °F (36.8 °C) 81 18 100 % 07/07/20 2317 98.2 °F (36.8 °C) 89 18 96 % 07/07/20 1914 98.5 °F (36.9 °C) 87 18 98 % 07/07/20 1536 98.4 °F (36.9 °C) 83 18 100 % Constitutional:  No acute distress, cooperative, pleasant   
ENT:  Oral mucosa moist, . Resp:  CTA bilaterally. No wheezing/rhonchi/rales. No accessory muscle use CV:  audible LVAD pump GI:  Soft, non distended, non tender. Musculoskeletal:  No edema, warm, 2+ pulses throughout Neurologic:  Moves all extremities. AAOx3, CN II-XII reviewed Psych:  Good insight, Not anxious nor agitated. 
   
  
Data Review:  
 Review and/or order of clinical lab test 
Review and/or order of tests in the radiology section of CPT Review and/or order of tests in the medicine section of CPT 
  
  
Labs:  
  
    
Recent Labs  
  07/07/20 0300 07/06/20 
0238 WBC 4.3 4.2 HGB 8.4* 8.3* HCT 27.2* 26.5*  
 161  
  
     
Recent Labs  
  07/07/20 0300 07/06/20 
0238 07/05/20 
0309  138 136  
K 4.2 4.3 4.2  109* 107 CO2 22 22 23 BUN 17 18 18 CREA 1.15 1.24 1.19 GLU 92 94 93 CA 8.4* 8.1* 7.5* MG 2.0 2.0 2.0  
  
     
Recent Labs  
  07/07/20 
0300 07/06/20 
0238 07/05/20 
0309 ALT 9* 11* 8*  112 116 TBILI 0.6 0.4 0.4 TP 6.4 6.2* 5.6* ALB 2.5* 2.5* 2.4*  
GLOB 3.9 3.7 3.2  
  
     
Recent Labs  
  07/07/20 
0300 07/06/20 
0238 07/05/20 
0309 INR 1.2* 1.2* 1.2* PTP 11.9* 12.0* 12.4* CHRONIC MEDICAL DIAGNOSES: 
Problem List as of 7/8/2020 Date Reviewed: 3/23/2020 Codes Class Noted - Resolved Anemia ICD-10-CM: D64.9 ICD-9-CM: 285.9  7/2/2020 - Present Hematuria ICD-10-CM: R31.9 ICD-9-CM: 599.70  7/2/2020 - Present Sepsis ([facility] ICD-10-CM: A41.9 ICD-9-CM: 038.9, 995.91  4/20/2020 - Present Chronic anticoagulation ICD-10-CM: Z79.01 
ICD-9-CM: V58.61  2/24/2020 - Present LVAD (left ventricular assist device) present [facility]) ICD-10-CM: S15.589 ICD-9-CM: V43.21  2/24/2020 - Present Tremor ICD-10-CM: R25.1 ICD-9-CM: 781.0  1/20/2020 - Present Acute decompensated heart failure (HCC) ICD-10-CM: I50.9 ICD-9-CM: 428.0  10/25/2019 - Present Peripheral vascular disease ([facility] ICD-10-CM: I73.9 ICD-9-CM: 443.9  9/12/2019 - Present Systolic CHF, chronic (HCC) ICD-10-CM: I50.22 ICD-9-CM: 428.22, 428.0  8/23/2019 - Present Acute on chronic systolic CHF (congestive heart failure) (HCC) ICD-10-CM: M43.75 ICD-9-CM: 428.23, 428.0  5/31/2019 - Present Paroxysmal atrial fibrillation (HCC) ICD-10-CM: I48.0 ICD-9-CM: 427.31  4/2/2019 - Present Greater than 30 minutes were spent with the patient on counseling and coordination of care Signed:  
Shannon Granado MD 
7/8/2020 
2:07 PM 
 
 Melolabial Transposition Flap Text: The defect edges were debeveled with a #15 scalpel blade.  Given the location of the defect and the proximity to free margins a melolabial flap was deemed most appropriate.  Using a sterile surgical marker, an appropriate melolabial transposition flap was drawn incorporating the defect.    The area thus outlined was incised deep to adipose tissue with a #15 scalpel blade.  The skin margins were undermined to an appropriate distance in all directions utilizing iris scissors.

## 2022-08-12 NOTE — PROGRESS NOTES
Nephrology Progress Note  Sona Kiser  Date of Admission : 7/17/2020    CC: Follow up for JAUN J on CKD       Assessment and Plan     JUAN J on CKD  - 2/2 volume depletion  - U/S neg for hydronephrosis  - cont volume expansion w/ albumin  - hold diuretics and monitor  - daily labs for now     HFrEF:  - EF 16-20%, NYHA Class IV , hx of VF arrest - s/p AICD  - s/p LVAD placement on 11/18     CKD Stage III   - Mild Left renal atrophy at baseline   - baseline Cr around 1.2 to 1.4     BPH w/ hx of urinary retention:  - bladder scan and SC if residual > 500  - on flomax and proscar     PAF s/p DCCV 6/19     Anemia:  - check iron studies in AM     Pseudomonas bacteremia:  - on zosyn       Interval History:  Seen and examined. Feeling ok. Cr stable at 1.9. Voiding ok. Appears to be emptying bladder. No cp, sob, n/v/d reported. Current Medications: all current  Medications have been eviewed in EPIC  Review of Systems: Pertinent items are noted in HPI. Objective:  Vitals:    Vitals:    07/20/20 0450 07/20/20 0516 07/20/20 0752 07/20/20 1125   BP:       Pulse: 90  84 90   Resp: 18 16 20   Temp: 98.3 °F (36.8 °C)  98.2 °F (36.8 °C) 98 °F (36.7 °C)   SpO2: 95%  95% 97%   Weight:  80.8 kg (178 lb 2.1 oz)     Height:         Intake and Output:  07/20 0701 - 07/20 1900  In: 360 [P.O.:360]  Out: 75 [Urine:75]  07/18 1901 - 07/20 0700  In: 2150 [P.O.:1200;  I.V.:950]  Out: 1425 [Urine:1425]    Physical Examination:  Pt intubated     No  General: NAD,Conversant   Neck:  Supple, no mass  Resp:  Lungs CTA B/L, no wheezing , normal respiratory effort  CV:  RRR,  + VAD sounds, no LE edema  GI:  Soft, NT, + Bowel sounds, no hepatosplenomegaly  Neurologic:  Non focal  Psych:             AAO x 3 appropriate affect   Skin:  No Rash  :  No neal    []    High complexity decision making was performed  []    Patient is at high-risk of decompensation with multiple organ involvement    Lab Data Personally Reviewed: I have reviewed all the pertinent labs, microbiology data and radiology studies during assessment. Recent Labs     07/20/20  0113 07/20/20  0112 07/19/20  0438 07/18/20  0448     --  138  139 136   K 4.2  --  4.1  4.2 4.0     --  107  107 104   CO2 25  --  24  24 25   *  --  107*  104* 98   BUN 26*  --  27*  26* 30*   CREA 1.91*  --  1.88*  1.86* 1.99*   CA 8.0*  --  8.4*  8.2* 8.7   MG  --  2.6*  --  2.7*   PHOS 3.5  --   --  4.1   ALB 2.9*  --  2.6*  2.7* 2.6*   ALT  --   --  11*  10* 14     Recent Labs     07/19/20  1055 07/18/20  0448   WBC 5.2 5.6   HGB 7.8* 7.8*   HCT 26.2* 25.1*    208     No results found for: [facility]  Lab Results   Component Value Date/Time    Culture result: (A) 07/19/2020 04:38 AM     GRAM NEGATIVE RODS GROWING IN 2 OF 4 BOTTLES DRAWN . ..(SITES= LFA ANR R ARM)    Culture result: (A) 07/19/2020 04:38 AM     PRELIMINARY REPORT OF GRAM NEGATIVE RODS GROWING IN 2 OF 4 BOTTLES DRAWN . ..(SITES= LFA AND R ARM) CALLED TO AND READ BACK BY DIANA POWELL ([place]) ON 07/20/2020 AT 0700 BY VRAH    Culture result: REMAINING BOTTLE(S) HAS/HAVE NO GROWTH SO FAR 07/19/2020 04:38 AM     Recent Results (from the past 24 hour(s))   PROCALCITONIN    Collection Time: 07/20/20  1:12 AM   Result Value Ref Range    Procalcitonin 0.45 ng/mL   NT-PRO BNP    Collection Time: 07/20/20  1:12 AM   Result Value Ref Range    NT pro-BNP 17,167 (H) <125 PG/ML   DIGOXIN    Collection Time: 07/20/20  1:12 AM   Result Value Ref Range    Digoxin level 0.9 0.90 - 2.00 NG/ML   LD    Collection Time: 07/20/20  1:12 AM   Result Value Ref Range     85 - 241 U/L   MAGNESIUM    Collection Time: 07/20/20  1:12 AM   Result Value Ref Range    Magnesium 2.6 (H) 1.6 - 2.4 mg/dL   RENAL FUNCTION PANEL    Collection Time: 07/20/20  1:13 AM   Result Value Ref Range    Sodium 136 136 - 145 mmol/L    Potassium 4.2 3.5 - 5.1 mmol/L    Chloride 107 97 - 108 mmol/L    CO2 25 21 - 32 mmol/L    Anion gap 4 (L) 5 - 15 mmol/L Glucose 108 (H) 65 - 100 mg/dL    BUN 26 (H) 6 - 20 MG/DL    Creatinine 1.91 (H) 0.70 - 1.30 MG/DL    BUN/Creatinine ratio 14 12 - 20      GFR est AA 43 (L) >60 ml/min/1.73m2    GFR est non-AA 35 (L) >60 ml/min/1.73m2    Calcium 8.0 (L) 8.5 - 10.1 MG/DL    Phosphorus 3.5 2.6 - 4.7 MG/DL    Albumin 2.9 (L) 3.5 - 5.0 g/dL               Nanci Severance, MD  [address] [place] [address]  Phone - [phone]   Fax - [phone]  [email or web address] High potassium foods removed from patient menu.  High Protein Gelatin, High Protein Apple Juice added to meal trays (low potassium supplements only).   no

## 2022-11-19 NOTE — PROGRESS NOTES
2300: Pt Sinus tach 120s with . C/o SOB while sleep on back with HOB at low anlge. Raised HOB, increased NC from 2 to 4 LPM. Page Dr Rojas Paz. Order received for 12.5 Albumin to administered at 50 ml/hr. Administer 5 mg Hydralazine IV Q4PRN for MAP > 90.      0715: Notified by techniologist, Cole Sorenson, in Microbiology that 2/4 Blood Cx bottles drawn 7/19 are positive for growth. 6735: Bedside and Verbal shift change report given to Doreen Whelan RN (oncoming nurse) by Omero Harrell (offgoing nurse). Report included the following information SBAR, Kardex, ED Summary, Intake/Output, MAR, Recent Results, Med Rec Status and Cardiac Rhythm NSR vs ST w 1st AVB. No

## 2023-03-01 NOTE — TELEPHONE ENCOUNTER
LVM to return call to reschedule nurse visit.Letter send.    Reviewed sleep study results with patient. He expressed understanding and is willing to proceed with a CPAP Titration. Please schedule titration.     Frandy Jones,RRT,RPSGT, CSE

## 2023-06-06 NOTE — TELEPHONE ENCOUNTER
Patient called stating he is more short of breath than normal.  Patient stated he wakes up feeling well but by noon he is so short of breath he has to use oxygen. His weight has been 191 lbs for the past three days. He gained 3 pounds from Monday to Tuesday - he weight 188 on Monday. His blood pressure was 105/76 today and 109/77 yesterday. Patient's cardiomems reading today is 25, and has been 25 since 10/9/19. His reading was 21 on 10/8/19. Patient went to the urologist yesterday and has a UTI. Patient was prescribed Keflex 500 mg 3 times daily and began it yesterday. Patient also began taking lexapro 5mg yesterday prescribed by Dr. Susy Govea. Patient is currently taking 60 mg torsemide BID. I spoke to [facility] NP and she wants patient to take 100 mg torsemide now and 80 mg torsemide BID over the weekend and call Monday with how he is feeling. I called the patient and explained [facility] NP orders. Patient expressed understanding. I also stated he should call us at any time if he is feeling worse or go the the ER. No

## 2023-07-22 NOTE — PROGRESS NOTES
[facility] VAD/Heart Failure Progress Note Admit Date: 10/25/2019 POD:  19 Days Post-Op Procedure:  Procedure(s): LEFT BRACHIAL THROMBECTOMY Subjective:  
Mild dyspnea, fatigue, and weakness; tired a lot;  
 
 Objective:  
Vitals: 
Blood pressure 91/72, pulse 77, temperature 98.2 °F (36.8 °C), resp. rate 18, height 6' 2\" (1.88 m), weight 187 lb 2.7 oz (84.9 kg), SpO2 98 %. Temp (24hrs), Av.2 °F (36.8 °C), Min:97.8 °F (36.6 °C), Max:98.5 °F (36.9 °C) Hemodynamics: 
 CO: CO (l/min): 5.8 l/min CI: CI (l/min/m2): 2.8 l/min/m2 CVP: CVP (mmHg): 4 mmHg (19 1645) SVR: SVR (dyne*sec)/cm5: 1080 (dyne*sec)/cm5 (19 1200) PAP Systolic: PAP Systolic: 34 (/ 5489) PAP Diastolic: PAP Diastolic: 13 (50/76/83 2992) PVR:   
 SV02: SVO2 (%): 67 % (19 1300) SCV02: SCVO2 (%): 75 % (10/29/19 1900) VAD Interrogation: LVAD (Heartmate) Pump Speed (RPM): 5800 Pump Flow (LPM): 5.1 PI (Pulsitility Index): 3.6 Power: 4.5 MAP: 84  Test: Yes 
Back Up  at Bedside & Labeled: Yes Power Module Test: Yes Driveline Site Care: No 
Driveline Dressing: Clean, Dry, and Intact EKG/Rhythm:   
 
Extubation Date / Time:  
 
CT Output:  
 
Ventilator: 
Ventilator Volumes Vt Set (ml): 550 ml (19 08) Vt Exhaled (Machine Breath) (ml): 639 ml (19 08) Vt Spont (ml): 437 ml (19 1035) Ve Observed (l/min): 7.25 l/min (19 1035) Oxygen Therapy: 
Oxygen Therapy O2 Sat (%): 98 % (19 1104) Pulse via Oximetry: 82 beats per minute (19 0811) O2 Device: Nasal cannula;CPAP mask (19 1201) O2 Flow Rate (L/min): 5 l/min (19 1201) FIO2 (%): 21 % (19 1721) CXR: 
 
Admission Weight: Last Weight Weight: 192 lb 10.9 oz (87.4 kg) Weight: 187 lb 2.7 oz (84.9 kg) Intake / Blossom Rankin / Reagan Mendez: 
Current Shift:  0701 -  1900 In: 457.5 [P.O.:360; I.V.:67.5] Out: 100 [Urine:100] Last 24 hrs.:  
 
Intake/Output Summary (Last 24 hours) at 12/14/2019 1214 Last data filed at 12/14/2019 1104 Gross per 24 hour Intake 912.07 ml Output 1730 ml Net -817.93 ml No results for input(s): CPK, CKMB, TROIQ in the last 72 hours. Recent Labs 12/14/19 
0356 12/13/19 
0300 12/12/19 
0403  140 140  
K 3.8 3.9 4.0  
CO2 30 29 27 BUN 38* 39* 38* CREA 2.31* 2.44* 2.56* * 123* 105* MG 2.1 1.9 2.0 WBC 4.7 4.7 4.5 HGB 7.8* 7.5* 7.8* HCT 26.1* 25.0* 25.9*  
 157 149* Recent Labs 12/14/19 
0356 12/13/19 
0300 12/12/19 
0403 INR 1.6* 1.6* 2.3* PTP 15.7* 15.4* 22.4* APTT 32.2* 31.2 66.2* No lab exists for component: PBNP Current Facility-Administered Medications:  
  sildenafil (REVATIO) 2 mg/mL oral suspension 40 mg, 40 mg, Oral, Q8H, Mounika Altman MD 
  warfarin (COUMADIN) tablet 5 mg, 5 mg, Oral, ONCE, Mounika Altman MD 
  magnesium oxide (MAG-OX) tablet 400 mg, 400 mg, Oral, BID, Andrés Herrera NP, 400 mg at 12/14/19 2688   diphenhydrAMINE (BENADRYL) capsule 25 mg, 25 mg, Oral, QHS PRN, Andrés Herrera NP 
  octreotide (SANDOSTATIN) injection 25 mcg, 25 mcg, SubCUTAneous, TID, Andrés Herrera NP, 25 mcg at 12/14/19 4945   benzocaine-menthol (CEPACOL) lozenge 1 Lozenge, 1 Lozenge, Mucous Membrane, PRN, Andrés Herrera NP 
  cefepime (MAXIPIME) 2 g in 0.9% sodium chloride (MBP/ADV) 100 mL, 2 g, IntraVENous, Q24H, Madeleine Herrera, NP, Last Rate: 200 mL/hr at 12/13/19 1333, 2 g at 12/13/19 1333   digoxin (LANOXIN) tablet 0.0625 mg, 62.5 mcg, Oral, Q MON, WED & FRI, Madeleine Herrera, NP, 0.0625 mg at 12/13/19 1756   milrinone (PRIMACOR) 20 MG/100 ML D5W infusion, 0.2 mcg/kg/min, IntraVENous, CONTINUOUS, Madeleine Herrera, NP, Last Rate: 5.6 mL/hr at 12/14/19 1109, 0.2 mcg/kg/min at 12/14/19 1109   levETIRAcetam (KEPPRA) oral solution 250 mg, 250 mg, Oral, Q12H, Chuy Matos, TIERRA, 250 mg at 12/14/19 4276   pantoprazole (PROTONIX) tablet 40 mg, 40 mg, Oral, ACB, Angelo Herrera NP, 40 mg at 12/14/19 4166   aspirin chewable tablet 81 mg, 81 mg, Oral, DAILY, Gail Andrews MD, 81 mg at 12/14/19 0854 
  0.9% sodium chloride infusion, 3 mL/hr, IntraVENous, CONTINUOUS, Angelo Herrera NP, Last Rate: 5 mL/hr at 12/13/19 0800, 5 mL/hr at 12/13/19 0800 
  albuterol-ipratropium (DUO-NEB) 2.5 MG-0.5 MG/3 ML, 3 mL, Nebulization, Q4H RT, Gail Andrews MD, 3 mL at 12/14/19 1108   allopurinol (ZYLOPRIM) tablet 100 mg, 100 mg, Oral, DAILY, Angelo Herrera NP, 100 mg at 12/14/19 0481   arformoterol (BROVANA) neb solution 15 mcg, 15 mcg, Nebulization, BID RT, 15 mcg at 12/13/19 2106 **AND** budesonide (PULMICORT) 500 mcg/2 ml nebulizer suspension, 500 mcg, Nebulization, BID RT, Angelo Herrera NP, 500 mcg at 12/14/19 0711 
  artificial saliva (MOUTH KOTE) 1 Spray, 1 Spray, Oral, PRN, Sharon, Preston Omalley NP, 1 Spray at 12/12/19 1452   lactobac ac& pc-s.therm-b.anim (TIAN Q/RISAQUAD), 1 Cap, Oral, DAILY, Carmen Baca MD, 1 Cap at 12/14/19 2238   oxyCODONE IR (ROXICODONE) tablet 5 mg, 5 mg, Oral, Q6H PRN, Aurora Ann MD, 5 mg at 12/07/19 2133   balsam peru-castor oil (VENELEX) ointment, , Topical, TID, Gail Andrews MD 
  tams[facility]) capsule 0.4 mg, 0.4 mg, Oral, DAILY, Logan Kincaid MD, 0.4 mg at 12/14/19 7308   insulin lispro (HUMALOG) injection, , SubCUTAneous, AC&HS, Shana Sims NP, Stopped at 12/13/19 1753   Warfarin MD/NP dosing, , Other, PRN, Mounika Altman MD 
  epoetin yvonne-epbx (RETACRIT) injection 20,000 Units, 20,000 Units, SubCUTAneous, Q7D, Rudy Carmichael MD, 20,000 Units at 12/10/19 5103   sodium chloride (NS) flush 5-40 mL, 5-40 mL, IntraVENous, Q8H, Aurora Ann MD, 10 mL at 12/14/19 3189   sodium chloride (NS) flush 5-40 mL, 5-40 mL, IntraVENous, PRN, Aurora Ann MD 
   acetaminophen (TYLENOL) tablet 650 mg, 650 mg, Oral, Q6H PRN, Won Toussaint MD, 650 mg at 12/10/19 0105 
  naloxone [facility]) injection 0.4 mg, 0.4 mg, IntraVENous, PRN, Won Toussaint MD 
  ondansetron [facility]) injection 4 mg, 4 mg, IntraVENous, Q4H PRN, Won Toussaint MD, 4 mg at 11/20/19 2000   senna-docusate (PERICOLACE) 8.6-50 mg per tablet 1 Tab, 1 Tab, Oral, DAILY, Won Toussaint MD, Stopped at 12/14/19 0900 
  bisacodyl (DULCOLAX) tablet 5 mg, 5 mg, Oral, DAILY PRN, Won Toussaint MD 
  sucralfate (CARAFATE) tablet 1 g, 1 g, Oral, AC&HS, Won Toussaint MD, 1 g at 12/14/19 1108   influenza vaccine 2019-20 (6 mos+)(PF) (FLUARIX/FLULAVAL/FLUZONE QUAD) injection 0.5 mL, 0.5 mL, IntraMUSCular, PRIOR TO DISCHARGE, Mounika Altman MD 
  hydrALAZINE (APRESOLINE) 20 mg/mL injection 20 mg, 20 mg, IntraVENous, Q6H PRN, Shana Sims NP, 20 mg at 12/10/19 0541 
  dextrose 10 % infusion 125-250 mL, 125-250 mL, IntraVENous, PRN, Margarito Gilliland MD, Stopped at 11/18/19 0700 A/P 
  
S/P LVAD - good flows Need for anti-coagulation - coumadin DM - insulin RV dysfunction - milrinone 
  
Risk of morbidity and mortality - high Medical decision making - high complexity Signed By: Greer Jaramillo MD 
 
 
 Heterosexual

## 2023-11-29 NOTE — CONSULTS
INPATIENT NEUROLOGY CONSULTATION 
11/20/2019 Consulted by: Vika Ibarra MD   
 
 
Patient ID: 
Nickie Reddy [id number] 
71 y.o. 
1950 CC: Left arm pain and weakness HPI 
 
58-year-old gentleman who was admitted in late October for acute on chronic heart failure. He has had a very long hospital course Consisting of recent LVAD placement. He is on anticoagulation. He was extubated and since then he has noticed his left arm has been painful and weak especially in the hand with some numbness and tingling in all of his fingers predominantly in the third through fifth digits. After talking with his nurse, it sounds like he has had multiple procedures to the left arm consisting of multiple A-line attempts from the antecubital fossa distally. He had an A-line removed today which was nonfunctioning. Prior to that removal he was having more pain and weakness and temperature dysregulation feeling cold in the hand. Since that a line has been removed he is feeling a little bit better. His hand is not as cold. He can  stronger now. Review of Systems Constitutional: Positive for malaise/fatigue. Eyes: Negative for double vision. Neurological: Positive for tingling, sensory change and focal weakness. All other systems reviewed and are negative. Past Medical History:  
Diagnosis Date  Degenerative disc disease, lumbar  Heart failure ([address]  High cholesterol  Hypertension  Paroxysmal atrial fibrillation ([address] 4/2/2019  Spinal stenosis Family History Problem Relation Age of Onset  Lung Disease Mother  Hypertension Mother [facility] Arthritis-osteo Mother  Heart Disease Mother  Heart Disease Father  Diabetes Father Social History Socioeconomic History  Marital status:  Spouse name: Not on file  Number of children: Not on file  Years of education: Not on file  Highest education level: Not on file Occupational History  Not on file Social Needs  Financial resource strain: Not on file  Food insecurity:  
  Worry: Not on file Inability: Not on file  Transportation needs:  
  Medical: Not on file Non-medical: Not on file Tobacco Use  Smoking status: Former Smoker Last attempt to quit: 2010 Years since quittin.9  Smokeless tobacco: Never Used Substance and Sexual Activity  Alcohol use: Not Currently Comment: rarely  Drug use: Never  Sexual activity: Not on file Lifestyle  Physical activity:  
  Days per week: Not on file Minutes per session: Not on file  Stress: Not on file Relationships  Social connections:  
  Talks on phone: Not on file Gets together: Not on file Attends Hoah[employer]ism service: Not on file Active member of club or organization: Not on file Attends meetings of clubs or organizations: Not on file Relationship status: Not on file  Intimate partner violence:  
  Fear of current or ex partner: Not on file Emotionally abused: Not on file Physically abused: Not on file Forced sexual activity: Not on file Other Topics Concern [address] Service Not Asked  Blood Transfusions Not Asked  Caffeine Concern Not Asked  Occupational Exposure Not Asked [place] Hazards Not Asked  Sleep Concern Not Asked  Stress Concern Not Asked  Weight Concern Not Asked  Special Diet Not Asked  Back Care Not Asked  Exercise Not Asked  Bike Helmet Not Asked  Seat Belt Not Asked  Self-Exams Not Asked Social History Narrative  Not on file Current Facility-Administered Medications Medication Dose Route Frequency  amiodarone (CORDARONE) tablet 400 mg  400 mg Oral DAILY  pantoprazole (PROTONIX) tablet 40 mg  40 mg Oral ACB  insulin lispro (HUMALOG) injection   SubCUTAneous TIDAC  insulin lispro (HUMALOG) injection   SubCUTAneous AC&HS  
  insulin glargine (LANTUS) injection 1-50 Units  1-50 Units SubCUTAneous ONCE PRN  
 warfarin (COUMADIN) tablet 2 mg  2 mg Oral ONCE  Warfarin MD/NP dosing   Other PRN  
 epoetin yvonne-epbx (RETACRIT) injection 20,000 Units  20,000 Units SubCUTAneous Q7D  
 0.9% sodium chloride infusion 250 mL  250 mL IntraVENous PRN  
 ceFAZolin (ANCEF) 1 g in 0.9% sodium chloride (MBP/ADV) 50 mL  1 g IntraVENous Q8H  
 [START ON 11/21/2019] dronabinol (MARINOL) capsule 2.5 mg  2.5 mg Oral ACB&D  
 bivalirudin (ANGIOMAX) 250 mg in 0.9% sodium chloride (MBP/ADV) 50 mL  0.02-2.5 mg/kg/hr IntraVENous TITRATE  lidocaine (LIDODERM) 5 % patch 1 Patch  1 Patch TransDERmal Q24H  
 0.9% sodium chloride infusion  9 mL/hr IntraVENous CONTINUOUS  
 0.45% sodium chloride infusion  10 mL/hr IntraVENous CONTINUOUS  
 sodium chloride (NS) flush 5-40 mL  5-40 mL IntraVENous Q8H  
 sodium chloride (NS) flush 5-40 mL  5-40 mL IntraVENous PRN  
 acetaminophen (TYLENOL) tablet 650 mg  650 mg Oral Q6H PRN  
 oxyCODONE IR (ROXICODONE) tablet 5 mg  5 mg Oral Q4H PRN  
 oxyCODONE IR (ROXICODONE) tablet 10 mg  10 mg Oral Q4H PRN  
 naloxone (NARCAN) injection 0.4 mg  0.4 mg IntraVENous PRN  
 mupirocin (BACTROBAN) 2 % ointment   Both Nostrils BID  ondansetron (ZOFRAN) injection 4 mg  4 mg IntraVENous Q4H PRN  
 albuterol-ipratropium (DUO-NEB) 2.5 MG-0.5 MG/3 ML  3 mL Nebulization Q6H PRN  chlorhexidine (PERIDEX) 0.12 % mouthwash 10 mL  10 mL Oral BID  senna-docusate (PERICOLACE) 8.6-50 mg per tablet 1 Tab  1 Tab Oral DAILY  polyethylene glycol (MIRALAX) packet 17 g  17 g Oral DAILY  bisacodyl (DULCOLAX) tablet 5 mg  5 mg Oral DAILY PRN  
 sucralfate (CARAFATE) tablet 1 g  1 g Oral AC&HS  
 0.9% sodium chloride infusion 250 mL  250 mL IntraVENous PRN  
 influenza vaccine 2019-20 (6 mos+)(PF) (FLUARIX/FLULAVAL/FLUZONE QUAD) injection 0.5 mL  0.5 mL IntraMUSCular PRIOR TO DISCHARGE  
  hydrALAZINE (APRESOLINE) 20 mg/mL injection 20 mg  20 mg IntraVENous Q6H PRN  
 morphine injection 4 mg  4 mg IntraVENous Q2H PRN  
 dextrose 10 % infusion 125-250 mL  125-250 mL IntraVENous PRN  
 milrinone (PRIMACOR) 20 MG/100 ML D5W infusion  0-0.75 mcg/kg/min IntraVENous TITRATE  DOBUTamine (DOBUTREX) 1,000 mg/250 mL (4,000 mcg/mL) infusion  0-10 mcg/kg/min IntraVENous TITRATE  nitroglycerin (Tridil) 200 mcg/ml infusion  0-200 mcg/min IntraVENous TITRATE  EPINEPHrine (ADRENALIN) 10 mg in 0.9% sodium chloride 250 mL infusion  0-10 mcg/min IntraVENous TITRATE  insulin regular (NOVOLIN R, HUMULIN R) 100 Units in 0.9% sodium chloride 100 mL infusion  1-50 Units/hr IntraVENous TITRATE  niCARdipine (CARDENE) 25 mg in 0.9% sodium chloride 250 mL infusion  0-15 mg/hr IntraVENous TITRATE  
 NOREPINephrine (LEVOPHED) 32 mg in dextrose 5% 250 mL (128 mcg/mL) infusion  0.5-16 mcg/min IntraVENous TITRATE  PHENYLephrine (JUAN PABLO-SYNEPHRINE) 100 mg in 0.9% sodium chloride 250 mL infusion   mcg/min IntraVENous TITRATE  vasopressin (VASOSTRICT) 20 Units in 0.9% sodium chloride 100 mL infusion  0-0.04 Units/min IntraVENous TITRATE  ELECTROLYTE REPLACEMENT NOTE: Nurse to review Serum Potassium and Magnesuim levels and Initiate Electrolyte Replacement Protocol as needed  1 Each Other PRN No Known Allergies Visit Vitals /87 (BP 1 Location: Right arm, BP Patient Position: At rest) Pulse 93 Temp 98.7 °F (37.1 °C) Resp 26 Ht 6' 2\" (1.88 m) Wt 92.4 kg (203 lb 11.2 oz) SpO2 94% BMI 26.15 kg/m² Physical Exam  
Constitutional: He appears well-developed and well-nourished. Cardiovascular: Normal rate. Pulmonary/Chest: Effort normal.  
Skin: There is erythema. Psychiatric: His behavior is normal.  
Vitals reviewed. Neurologic Exam  
 
Mental Status Age-appropriate gentleman sitting upright in chair.   Awake and alert and can give me details about his symptoms in the left arm. Pupils are equal, EOMI without deficit Face is grossly symmetric on smile tongue is midline speech is clear Upper extremity strength 5/5 throughout with the exception of thumb abduction 4/5. Good  bilaterally No atrophy. Extremities with some mild edema. Legs not assessed DTRs trace Mild reduced sensation in the distal left hand Gait deferred due to condition Lab Results Component Value Date/Time WBC 6.6 11/20/2019 03:01 AM  
 HGB 8.0 (L) 11/20/2019 03:01 AM  
 HCT 25.9 (L) 11/20/2019 03:01 AM  
 PLATELET 92 (L) 35/21/0034 03:01 AM  
 MCV 98.9 11/20/2019 03:01 AM  
 
Lab Results Component Value Date/Time Hemoglobin A1c 6.6 (H) 10/25/2019 02:56 PM  
 Glucose 110 (H) 11/20/2019 03:01 AM  
 Glucose (POC) 133 (H) 11/20/2019 10:59 AM  
 LDL, calculated 56.2 10/26/2019 04:09 AM  
 Creatinine 1.43 (H) 11/20/2019 03:01 AM  
  
Lab Results Component Value Date/Time Cholesterol, total 90 10/26/2019 04:09 AM  
 HDL Cholesterol 21 10/26/2019 04:09 AM  
 LDL, calculated 56.2 10/26/2019 04:09 AM  
 Triglyceride 64 10/26/2019 04:09 AM  
 CHOL/HDL Ratio 4.3 10/26/2019 04:09 AM  
 
Lab Results Component Value Date/Time ALT (SGPT) 9 (L) 11/20/2019 03:01 AM  
 AST (SGOT) 60 (H) 11/20/2019 03:01 AM  
 Alk. phosphatase 72 11/20/2019 03:01 AM  
 Bilirubin, total 5.1 (H) 11/20/2019 03:01 AM  
 Albumin 2.7 (L) 11/20/2019 03:01 AM  
 Protein, total 5.2 (L) 11/20/2019 03:01 AM  
 INR 1.4 (H) 11/20/2019 03:01 AM  
 Prothrombin time 14.0 (H) 11/20/2019 03:01 AM  
 PLATELET 92 (L) 43/42/5416 03:01 AM  
 Hepatitis B surface Ag <0.10 06/13/2019 11:40 AM  
 Hepatitis B surface Ag <0.10 06/13/2019 11:40 AM  
 
Lab Results Component Value Date/Time  INR 1.4 (H) 11/20/2019 03:01 AM  
 INR 1.2 (H) 11/19/2019 03:49 AM  
 INR 1.3 (H) 11/18/2019 06:51 PM  
 Prothrombin time 14.0 (H) 11/20/2019 03:01 AM  
 Prothrombin time 12.4 (H) 11/19/2019 03:49 AM  
 Prothrombin time 12.6 (H) 11/18/2019 06:51 PM  
   
 
CT Results (maximum last 3): Results from Hospital Encounter encounter on 10/25/19 CT CHEST WO CONT Narrative INDICATION: LVAD eval 
 
COMPARISON: Chest radiograph earlier today TECHNIQUE:  Noncontrast 5 mm axial images were obtained through the chest. CT 
dose reduction was achieved through use of a standardized protocol tailored for 
this examination and automatic exposure control for dose modulation. FINDINGS: 
 
THYROID: Unremarkable. MEDIASTINUM/LUCERO: Prior median sternotomy with chronic dehiscence of the 
sternum. No evidence of fluid collection. Left subclavian pacemaker with leads 
in the right atrium and left ventricle. Right-sided PICC line tip terminates 
over the distal superior vena cava. Right subclavian Impella device terminates 
in the inferior posterior left ventricle. Cardio MEMS device evident left lower 
lobe pulmonary artery similar to prior chest radiographs. 19 mm precarinal lymph 
node; 19 mm subcarinal lymph node. HEART/PERICARDIUM: Heart is borderline enlarged. No significant pericardial 
effusion. LUNGS/PLEURA: Emphysema. No pneumothorax. Mild interstitial edema. Small pleural 
effusions right greater than left. INCIDENTALLY IMAGED UPPER ABDOMEN: No significant abnormality. BONES: No destructive bone lesion. ADDITIONAL COMMENTS:  Subcutaneous fluid and gas in the right supraclavicular 
fossa and over the pectoralis muscle, measuring approximately 5.9 x 2.9 cm. Impression IMPRESSION: 
 
1. Right subclavian Impella device, terminating in the left ventricle. Small 
amount of subcutaneous fluid and gas at the insertion site of the right 
supraclavicular fossa and over the pectoralis muscle as above. No pneumothorax 
or pneumomediastinum. 2. Other life support lines and tubes as above. 3. Emphysema with small pleural effusions right greater than left. CT ABD PELV WO CONT Narrative INDICATION:  Heart failure. LVAD eval  
 
EXAM: CT Chest, Abdomen and Pelvis. CT dose reduction was achieved through the 
use of a standardized protocol tailored for this examination and automatic 
exposure control for dose modulation. Contrast: None. CHEST:  
The lungs are emphysematous but without infiltrate or edema. There is low-grade 
nonspecific noncalcified mediastinal adenopathy. There is trace right pleural 
effusion. There is no left pleural or pericardial effusion. Heart is large. There is prior CABG. ICD is present. Visualized thyroid and lower neck soft 
tissues are unremarkable for age. ABDOMEN PELVIS: 
There are colonic diverticula. Bowels are not dilated. There is no inflammation, 
free air or significant adenopathy. Liver is uniform in texture. Bile ducts and spleen are not enlarged. Pancreas 
shows no mass or inflammation. Adrenal glands are normal in size. Kidneys show 
no stone, mass or hydronephrosis; left kidney is moderately atrophic. There is aortoiliac calcification. Abdominal aorta is mildly ectatic distally, 
2.5 cm diameter. The bladder is moderately distended with several diverticula. The distal ureters 
are not dilated. There is no pelvic mass. Impression IMPRESSION:  
1. Low-grade nonspecific mediastinal adenopathy. 2. Trace right pleural effusion. 3. Emphysema. 4. Left renal atrophy. 5. Colonic diverticula. 6. Moderate bladder distention with diverticula. MRI Results (maximum last 3): No results found for this or any previous visit. VAS/US/Carotid Doppler Results (maximum last 3): No results found for this or any previous visit. PET Results (maximum last 3): No results found for this or any previous visit. Assessment and Plan 72-year-old gentleman with distal left arm pain and numbness and tingling and weakness I suspect this is peripheral probably in the setting of soft tissue injury due to multiple A-line attempts possibly with a component of vascular compromise. No clear evidence of myelopathy. His strength is getting better but he does have notable reduced thumb abduction consistent with a median nerve process. He might of had possible nerve injury but mild since he is improving now. I would clinically watch this. Consider using a hand/wrist brace at night so he does not sleep on his hand uncomfortably. If he begins to have additional color changes or coldness it would be reasonable to have a vascular consult. This is not stroke. No need for neuroimaging. Please call if needed. During this evaluation, we also discussed stroke education to include signs and symptoms of stroke and TIA. This clinical note was dictated with an electronic dictation software that can make unintentional errors. If there are any questions, please contact me directly for clarification. [address] [facility], DO 
NEUROLOGIST Diplomate MATT 
11/20/2019 General Sunscreen Counseling: I recommended a broad spectrum sunscreen with a SPF of 30 or higher.  I explained that SPF 30 sunscreens block approximately 97 percent of the sun's harmful rays.  Sunscreens should be applied at least 15 minutes prior to expected sun exposure and then every 2 hours after that as long as sun exposure continues. If swimming or exercising sunscreen should be reapplied every 45 minutes to an hour after getting wet or sweating.  One ounce, or the equivalent of a shot glass full of sunscreen, is adequate to protect the skin not covered by a bathing suit. I also recommended a lip balm with a sunscreen as well. Sun protective clothing can be used in lieu of sunscreen but must be worn the entire time you are exposed to the sun's rays. Detail Level: Detailed

## 2024-03-26 NOTE — PROGRESS NOTES
4081 [facility] [place] 904 [facility] [place] in [address], [place] Inpatient Progress Note Patient name: Anya Bingham Patient : 1950 Patient MRN: [id number] Attending MD: Mitch Ham MD 
Date of service: 19 CHIEF COMPLAINT: 
Cardiogenic shock 
  
PLAN: 
 
Chronic systolic heart failure due to ICM, NYHA Class IV, cardiogenic shock on Impella 5.0 support Continue current impella speed at P8; cannot tolerate lower speeds Impella dislodged and repositioned this afternoon, continue cefazolin indefinitely due to risk of infection Goal CVP 10-12 mmHg - transduce CVP via PICC Bumex 2 mg IV BID today d/t volume overload r/t bowel prep - appreciate nephrology assistance Continue CAROL hose to mobilize LE fluid No ACE/ARB/ARNi in anticipation of surgery No AA due to hyponatremia and upcoming surgery No tolvaptan due to hepatic dysfuction Continue low dose hydralazine 10mg TID- keep SBP < 120, MAP < 90 Continue small dose amiodarone for PAF Anemia and thrombocytopenia likely due to hematuria and hemolysis Urinary retention and Serratia UTI with hematuria, on antibiotics by ID - ?length of therapy - need to determine timing of LVAD implant Urology consult appreciated, continue bladder irrigation Request cystoscopy to evaluate for bladder carcinoma prior to LVAD implant Anticoagulation and ASA on hold due to hematuria and epistaxis Cannot use octeotride or doxycycline for epistaxis/AVM prophylaxis due to QTc 502 ms Nutritionist consult, prealbumins weekly Continue Marinol and 6 small meals daily- diet adjusted Pulmonary consult appreciated - repeat chest CT showing again mild lymphadenopathy, no mass/nodules Unable to perform DLCO while on impella support PFTs unchanged post bronchodilator therapy, but adequate for LVAD consideration Colonoscopy, EGD complete - mild diverticulosis noted on colonoscopy, EGD normal  
 chest wall non-tender, breathing is unlabored without accessory muscle use, normal breath sounds LHC to be completed by Dr. Otilio Palumbo Wednesday or Thursday Unble to have PET scan due to dextrose required for Impella infusion Ongoing PT/OT/VAD education Tentatively planned for LVAD implant 11/18 - DT letter requested from [facility] IMPRESSION: 
Cardiogenic shock S/p Impella 5.0 implant 10/29/19 by Dr. Emmanuel Blackwood Acute on chronic systolic heart failure Stage D, NYHA class IV symptoms Non-ischemic cardiomyopathy, LVEF 15%, LVEDD 5.7cm Hyponatremia Liver dysfunction Thrombocytopenia H/o VF arrest s/p ICD 
CAD s/p CABG 2010 C/b sternal wound infection requiring sternectomy CKD PAF s/p DCCV 61/8/19 UTI with GNR Anemia, iron deficiency Cardiac cachexia Vocal cord paralysis Epistaxis Hematuria 
  
CARDIAC IMAGING: 
Tagged WBC negative 
  INTERVAL HISTORY: 
Volume overloaded Impella cannula dislodged, repositioned EGD/colonoscopy complete Hematuria resolved Patient seen and examined. Data and note reviewed. I have discussed and agree with the plans as noted. 71year old male with a history of ICM, CS requiring Impella 5.0 support. Currently undergoing LVAD evaluation for DT/OHT. He is scheduled today for EGD/colonoscopy to assess for GI malignancy and bleeding risk on LVAD support. Request cytoscopy to assess for bladder cancer. Plan LHC and RHC on Wed with Dr. Otilio Palumbo Thank you for allowing us to participate in your patient's care. Mounika Peterson MD, Claudia Muhammad Chief of Cardiology, [facility] Medical Director Calos Rueda [address] [place] [address], [address] [place] [address] Office [phone] Fax [phone] PHYSICAL EXAM: 
Visit Vitals BP 98/85 Pulse 93 Temp 98 °F (36.7 °C) Resp 24 Ht 6' 2\" (1.88 m) Wt 198 lb (89.8 kg) SpO2 (!) 85% BMI 25.42 kg/m² Impella (5.0) Performance Level (P Level): 8 Flow Rate L/min (0-2.5): 4.5 L/min Placement Signal (mmHg): Differential 5.0 (s/d 30-60/0 ex) Placement Signal (Systolic/Diastolic): (90/-43) Motor Current (amp): (normal) Motor Current (Systolic/Diastolic): 135/717 Placement Monitoring: OK Purge Pressure (300-1100 mm Hg): 428 Purge Flow (ml/hr): 15.9 Sidearm Pressure Bag @ 300 mmHg/ flushed (Na with 5.0 Impella): No 
Power (AC/Battery): Yes Impella Pump Serial Number: [id number] Hemodynamics: 
 CVP: CVP (mmHg): 319 mmHg (11/11/19 1505) Review of Systems Constitutional: Negative for chills, fever and malaise/fatigue. HENT: Positive for hearing loss. Respiratory: Negative for cough and shortness of breath. Cardiovascular: Negative for chest pain, palpitations, leg swelling and PND. Gastrointestinal: Negative for heartburn, nausea and vomiting. Appetite greatly improved Musculoskeletal: Negative. Neurological: Negative for dizziness, focal weakness and headaches. Psychiatric/Behavioral: Negative. Physical Exam  
Constitutional: He is oriented to person, place, and time and well-developed, well-nourished, and in no distress. No distress. HENT:  
Head: Normocephalic. Neck: Normal range of motion. Neck supple. No JVD present. Cardiovascular: Normal rate, regular rhythm, normal heart sounds and intact distal pulses. Pulmonary/Chest: Effort normal and breath sounds normal. No respiratory distress. Abdominal: Soft. Bowel sounds are normal. He exhibits no distension. Musculoskeletal: Normal range of motion. He exhibits no edema. Neurological: He is alert and oriented to person, place, and time. Skin: Skin is warm and dry. No erythema. Psychiatric: Affect and judgment normal.  
Nursing note and vitals reviewed. PAST MEDICAL HISTORY: 
Past Medical History:  
Diagnosis Date  Degenerative disc disease, lumbar  Heart failure ([address]  High cholesterol  Hypertension  Paroxysmal atrial fibrillation ([address] 4/2/2019  Spinal stenosis PAST SURGICAL HISTORY: 
Past Surgical History: Procedure Laterality Date  HX APPENDECTOMY  HX CORONARY ARTERY BYPASS GRAFT    
 triple  HX HERNIA REPAIR    
 HX IMPLANTABLE CARDIOVERTER DEFIBRILLATOR  UT CARDIOVERSION ELECTIVE ARRHYTHMIA EXTERNAL N/A 6/10/2019 EP CARDIOVERSION performed by Albin Pichardo MD at [address], [facility] Dr CATH LAB  UT CARDIOVERSION ELECTIVE ARRHYTHMIA EXTERNAL N/A 2019 EP CARDIOVERSION performed by Analisa Singh MD at [address] [facility] [address] CATH LAB  UT INSJ/RPLCMT PERM DFB W/TRNSVNS LDS 1/DUAL CHMBR N/A 2019 INSERT ICD BIV MULTI performed by Simin Fierro MD at [address] [facility] [address] CATH LAB  UT TCAT IMPL WRLS P-ART PRS SNR L-T HEMODYN MNTR N/A 2019 IMPLANT HEART FAILURE MONITORING DEVICE performed by Sudha Spears MD at [address] [facility] [address] CATH LAB FAMILY HISTORY: 
Family History Problem Relation Age of Onset  Lung Disease Mother  Hypertension Mother [facility] Arthritis-osteo Mother  Heart Disease Mother  Heart Disease Father  Diabetes Father SOCIAL HISTORY: 
Social History Socioeconomic History  Marital status:  Spouse name: Not on file  Number of children: Not on file  Years of education: Not on file  Highest education level: Not on file Tobacco Use  Smoking status: Former Smoker Last attempt to quit: 2010 Years since quittin.9  Smokeless tobacco: Never Used Substance and Sexual Activity  Alcohol use: Not Currently Comment: rarely  Drug use: Never Other Topics Concern LABORATORY RESULTS: 
  
Labs Latest Ref Rng & Units 2019 11/10/2019 2019 2019 2019 2019 2019 WBC 4.1 - 11.1 K/uL 4.4 4.8 5.0 4.9 - 4.6 4.9  
RBC 4.10 - 5.70 M/uL 2.86(L) 2.89(L) 2.82(L) 2.89(L) - 2.69(L) 2.76(L) Hemoglobin 12.1 - 17.0 g/dL 8.5(L) 8.6(L) 8.2(L) 8.5(L) 8.5(L) 7. 8(L) 8.0(L) Hematocrit 36.6 - 50.3 % 27. 5(L) 28. 0(L) 27. 0(L) 27. 7(L) 27. 9(L) 25. 4(L) 26. 0(L) MCV 80.0 - 99.0 FL 96.2 96.9 95.7 95.8 - 94.4 94.2 Platelets 804 - 587 K/uL 93(L) 105(L) 98(L) 97(L) - 90(L) 84(L) Lymphocytes 12 - 49 % - - - - - - - Monocytes 5 - 13 % - - - - - - - Eosinophils 0 - 7 % - - - - - - - Basophils 0 - 1 % - - - - - - - Albumin 3.5 - 5.0 g/dL 2. 2(L) 2. 3(L) 2. 3(L) 2. 4(L) - 2. 4(L) 2. 3(L) Calcium 8.5 - 10.1 MG/DL 8.6 8.5 8.6 8.7 - 8.7 8.5 SGOT 15 - 37 U/L 28 29 35 36 - 40(H) 42(H) Glucose 65 - 100 mg/dL 76 89 79 85 - 90 83 BUN 6 - 20 MG/DL 13 16 15 17 - 18 18 Creatinine 0.70 - 1.30 MG/DL 0.97 1.25 1.13 1.25 - 1.19 1.03 Sodium 136 - 145 mmol/L 136 134(L) 135(L) 136 - 135(L) 132(L) Potassium 3.5 - 5.1 mmol/L 3. 3(L) 4.0 4.0 4.1 - 3.6 3.9 TSH 0.36 - 3.74 uIU/mL - - - - - - -  
PSA 0.01 - 4.0 ng/mL - - - - - - -  
LDH 85 - 241 U/L 422(H) 435(H) 438(H) 439(H) - 458(H) 436(H) CEA ng/mL - - - 6.6 - - - Some recent data might be hidden Lab Results Component Value Date/Time TSH 2.40 10/25/2019 07:39 PM  
 TSH 2.45 06/01/2019 04:16 AM  
 
 
ALLERGY: 
No Known Allergies CURRENT MEDICATIONS: 
Current Facility-Administered Medications Medication Dose Route Frequency  0.9% sodium chloride infusion  50 mL/hr IntraVENous CONTINUOUS  
 sodium chloride (NS) flush 5-40 mL  5-40 mL IntraVENous Q8H  
 sodium chloride (NS) flush 5-40 mL  5-40 mL IntraVENous PRN  
 midazolam (VERSED) injection 0.25-5 mg  0.25-5 mg IntraVENous Multiple  fentaNYL citrate (PF) injection  mcg   mcg IntraVENous Multiple  naloxone (NARCAN) injection 0.4 mg  0.4 mg IntraVENous Multiple  flumazenil (ROMAZICON) 0.1 mg/mL injection 0.2 mg  0.2 mg IntraVENous Multiple  simethicone (MYLICON) 06TU/6.2PV oral drops 80 mg  1.2 mL Oral Multiple  atropine injection 0.5 mg  0.5 mg IntraVENous ONCE PRN  
 EPINEPHrine (ADRENALIN) 0.1 mg/mL syringe 1 mg  1 mg Endoscopically ONCE PRN  
 bumetanide (BUMEX) injection 2 mg  2 mg IntraVENous Q12H  ceFAZolin (ANCEF) 2 g/20 mL in sterile water IV syringe  2 g IntraVENous Q8H  
 hydrALAZINE (APRESOLINE) tablet 10 mg  10 mg Oral TID  
 0.9% sodium chloride infusion 250 mL  250 mL IntraVENous PRN  
 amiodarone (CORDARONE) tablet 100 mg  100 mg Oral DAILY  dronabinol (MARINOL) capsule 2.5 mg  2.5 mg Oral ACB&D  
 insulin lispro (HUMALOG) injection   SubCUTAneous AC&HS  bumetanide (BUMEX) injection 1 mg  1 mg IntraVENous Q6H PRN  
 fluticasone propionate (FLONASE) 50 mcg/actuation nasal spray 2 Spray  2 Spray Both Nostrils DAILY  sodium chloride (OCEAN) 0.65 % nasal squeeze bottle 2 Spray  2 Spray Both Nostrils QID  alteplase (CATHFLO) 1 mg in dextrose 5% 50 mL impella purge solution  1 mg Other TITRATE  epoetin yvonne-epbx (RETACRIT) injection 10,000 Units  10,000 Units SubCUTAneous Q TUE, THU & SAT  pantoprazole (PROTONIX) tablet 40 mg  40 mg Oral ACB  dextrose 5% infusion  4-20 mL/hr IntraVENous CONTINUOUS  
 [Held by provider] bivalirudin (ANGIOMAX) 250 mg in dextrose 5% 250 mL infusion  4-20 mL/hr Other TITRATE  alteplase (CATHFLO) 1 mg in sterile water (preservative free) 1 mL injection  1 mg InterCATHeter PRN  
 bacitracin 500 unit/gram packet 1 Packet  1 Packet Topical PRN  
 sodium chloride (NS) flush 5-40 mL  5-40 mL IntraVENous Q8H  
 sodium chloride (NS) flush 5-40 mL  5-40 mL IntraVENous PRN  
 0.45% sodium chloride infusion  10 mL/hr IntraVENous CONTINUOUS  
 0.9% sodium chloride infusion  10 mL/hr IntraVENous CONTINUOUS  
 oxyCODONE IR (ROXICODONE) tablet 5 mg  5 mg Oral Q4H PRN  
 oxyCODONE IR (ROXICODONE) tablet 10 mg  10 mg Oral Q4H PRN  
 morphine injection 4 mg  4 mg IntraVENous Q2H PRN  
 naloxone (NARCAN) injection 0.4 mg  0.4 mg IntraVENous PRN  
 ondansetron (ZOFRAN) injection 4 mg  4 mg IntraVENous Q4H PRN  
 albuterol (PROVENTIL VENTOLIN) nebulizer solution 2.5 mg  2.5 mg Nebulization Q4H PRN  
  chlorhexidine (PERIDEX) 0.12 % mouthwash 10 mL  10 mL Oral Q12H  
 magnesium oxide (MAG-OX) tablet 400 mg  400 mg Oral BID  calcium chloride 1 g in 0.9% sodium chloride 250 mL IVPB  1 g IntraVENous PRN  
 bisacodyl (DULCOLAX) suppository 10 mg  10 mg Rectal DAILY PRN  
 senna-docusate (PERICOLACE) 8.6-50 mg per tablet 1 Tab  1 Tab Oral BID  polyethylene glycol (MIRALAX) packet 17 g  17 g Oral DAILY  ELECTROLYTE REPLACEMENT NOTE: Nurse to review Serum Potassium and Magnesuim levels and Initiate Electrolyte Replacement Protocol as needed  1 Each Other PRN  
 magnesium sulfate 1 g/100 ml IVPB (premix or compounded)  1 g IntraVENous PRN  
 glucose chewable tablet 16 g  4 Tab Oral PRN  
 glucagon (GLUCAGEN) injection 1 mg  1 mg IntraMUSCular PRN  
 dextrose 10% infusion 0-250 mL  0-250 mL IntraVENous PRN  
 morphine injection 2 mg  2 mg IntraVENous Q2H PRN  
 melatonin tablet 3 mg  3 mg Oral QHS PRN  
 albumin human 5% (BUMINATE) solution 25 g  25 g IntraVENous Q2H PRN  
 influenza vaccine 2019-20 (6 mos+)(PF) (FLUARIX/FLULAVAL/FLUZONE QUAD) injection 0.5 mL  0.5 mL IntraMUSCular PRIOR TO DISCHARGE  
 [Held by provider] aspirin delayed-release tablet 81 mg  81 mg Oral DAILY  tamsulosin (FLOMAX) capsule 0.8 mg  0.8 mg Oral DAILY  allopurinol (ZYLOPRIM) tablet 100 mg  100 mg Oral DAILY Thank you for allowing me to participate in this patient's care. TIERRA Singh [address]571 0[address], [address] Phone: [phone] Fax: [phone]

## 2024-07-07 NOTE — PROGRESS NOTES
1940: Report received from [facility]. 0740: Bedside and Verbal shift change report given to [address] (oncoming nurse) by Nel Goodwin RN (offgoing nurse). Report included the following information SBAR, Kardex, ED Summary, Procedure Summary, Intake/Output, MAR, Recent Results, Med Rec Status and Cardiac Rhythm Paced / A-Fib. Yes No

## 2024-07-23 NOTE — DIABETES MGMT
[facility]    [facility] Cardiac Surgery Note  Follow Up    Recommendations/ Comments: BG's remain < 140 mg/dL has required no lispro correction   Pt is s/p LVAD placement 11/18/2019. Had brachial thrombectomy 11/26/19. Pt returned to CVICU 12/4/2019 due to respiratory distress. PO intake varies - 25 - 50%  Please add carb consistent to current diet order    Observe po intake and BG response    Current hospital DM medication: lispro correction scale, normal sensitivity    Chart reviewed and initial evaluation complete on Sona Kiser. Patient is a 71 y.o. male with no prior hx. Recent A1c suggestive of new dx. DTC saw pt for education regarding new diagnosis on 11/27/2019. May need review closer to discharge    A1c:   Lab Results   Component Value Date/Time    Hemoglobin A1c 6.6 (H) 10/25/2019 02:56 PM                  Recent Glucose Results:   Lab Results   Component Value Date/Time     (H) 12/30/2019 04:15 AM    GLUCPOC 164 (H) 12/30/2019 11:07 AM    GLUCPOC 108 (H) 12/30/2019 06:18 AM    GLUCPOC 150 (H) 12/29/2019 10:41 PM        Lab Results   Component Value Date/Time    Creatinine 1.30 12/30/2019 04:15 AM     Estimated Creatinine Clearance: 56.7 mL/min (based on SCr of 1.3 mg/dL). Active Orders   Diet    DIET REGULAR        PO intake:   Patient Vitals for the past 72 hrs:   % Diet Eaten   12/30/19 0801 50 %   12/29/19 0912 80 %   12/28/19 1803 75 %   12/28/19 0939 80 %     Will continue to follow as needed. Thank you.   Kaylee Vicente RN, [address]: 69 y.o. female who presents for 1 month follow up of lymphedema BLE.  Previously went to lymphedema clinic.  Has not been in a while.  She has not been using her compression stockings.  No worsening.  No headaches, sob, dizziness.  She has onychomycosis being treated with terbinafine.  This seems to be working.   O: VS: BP (!) 108/53 (Site: Right Upper Arm, Position: Sitting, Cuff Size: Large Adult)   Pulse 71   Temp 97.9 °F (36.6 °C) (Temporal)   Resp 18   Ht 1.6 m (5' 2.99\")   Wt 115.2 kg (254 lb)   SpO2 97%   BMI 45.01 kg/m²    General: NAD, appropriate affect and grooming   CV:  RRR, no gallops, rubs, or murmurs   Resp: CTAB   Abd:  Soft, nontender   Ext:  2+ bilateral edema, non pitting to the knees  Impression/Plan:   Lymphedema-same  Same recs-compression stockings, lymphedema clinic follow up  Onychomycosis-improving; continue treatment for the next 6 weeks    Attending Physician Statement  I have discussed the case, including pertinent history and exam findings with the resident.  I agree with the documented assessment and plan.         Adult)   Pulse 71   Temp 97.9 °F (36.6 °C) (Temporal)   Resp 18   Ht 1.6 m (5' 2.99\")   Wt 115.2 kg (254 lb)   SpO2 97%   BMI 45.01 kg/m²   General Appearance: Well developed, awake, alert, oriented, and in NAD  HEENT: NCAT, MMM, no pallor or icterus.   Neck: Symmetrical, trachea midline.  Chest wall/Lung: CTAB, respirations unlabored. No ronchi/wheezing/rales  Heart: RRR, normal S1 and S2, no murmurs, rubs or gallops.   Abdomen: SNTND  Extremities: Extremities normal, atraumatic, no cyanosis, clubbing or edema.  R toe nail with thickened nail, no TTP today. See media  Gait: Walks with bent knees, slow but stable.  Get up and go test negative.    ______________________________________________________________________    Assessment & Plan :    1. Leg edema  Overview:  Stopped going to the [facility] because of cost, encourage to call them for other recommendations  Wears compression socks, too hot in the summer  Raises her legs at home  2. Onychomycosis  Overview:  Trial terbinafine for 12 weeks  Soak with apple cider vinegar  3. Obesity, Class III, BMI 40-49.9 (morbid obesity) (HCC)  4. Financial difficulties   Lifestyle modifications but difficult due to financial difficulties      Additional plan and future considerations:   Refusing vaccines today due to fear of needles  Started atorvastatin    Return to Office: 2 months for GIOVANNA Hernandez MD  Case discussed with Dr. Dickey

## 2025-01-14 NOTE — PROGRESS NOTES
January 14, 2025       BETHANY SAUNDERS  [address]  [facility] [address]      Dear Bethany,    At [facility], we re dedicated to improving your health and wellness. Enclosed is the Support Plan developed with you on 12/18/2024. Please review the Support Plan carefully.    As a reminder, during your visit we talked about:   Ways to manage your physical and mental health   Using health care to maintain and improve your health    Your preventive care needs      Remember to contact your care coordinator if you:   Are hospitalized or plan to be hospitalized    Have a fall     Have a change in your physical or mental health   Need help finding support or services    If you have questions or don t agree with your Support Plan, call me at [phone]. You can also call me if your needs change. TTY users call the [place] [employer] at 304 or [phone] (cfkxgk-vj-krwbhs relay service).    Sincerely,       Lizzie Wilson RN  [phone]  [email or web address]                [id number] accepted     (06/2024)                [address], [place] [address]  [phone]  fax [phone]  [facility]           [facility] VAD/Heart Failure Progress Note Admit Date: 10/25/2019 POD:  6 Days Post-Op Procedure:  Procedure(s): RIGHT HEART CATH Subjective:  
Mild dyspnea, fatigue, and weakness; working on ambulation Objective:  
Vitals: 
Blood pressure 91/72, pulse 81, temperature 97.6 °F (36.4 °C), resp. rate 20, height 6' 2\" (1.88 m), weight 186 lb 1.1 oz (84.4 kg), SpO2 92 %. Temp (24hrs), Av.7 °F (36.5 °C), Min:97.5 °F (36.4 °C), Max:98.1 °F (36.7 °C) Hemodynamics: 
 CO: CO (l/min): 5.8 l/min CI: CI (l/min/m2): 2.8 l/min/m2 CVP: CVP (mmHg): 4 mmHg (19 1645) SVR: SVR (dyne*sec)/cm5: 1080 (dyne*sec)/cm5 (19 1200) PAP Systolic: PAP Systolic: 34 (41/ 4733) PAP Diastolic: PAP Diastolic: 13 (93/94/29 6093) PVR:   
 SV02: SVO2 (%): 67 % (19 1300) SCV02: SCVO2 (%): 75 % (10/29/19 1900) VAD Interrogation: LVAD (Heartmate) Pump Speed (RPM): 5800 Pump Flow (LPM): 4.2 Chatter in Lines: No 
PI (Pulsitility Index): 6.6 Power: 4.5 MAP: 119  Test: Yes 
Back Up  at Bedside & Labeled: Yes Power Module Test: Yes Driveline Site Care: No 
Driveline Dressing: Clean, Dry, and Intact EKG/Rhythm:   
 
Extubation Date / Time:  
 
CT Output:  
 
Ventilator: 
Ventilator Volumes Vt Set (ml): 550 ml (19 08) Vt Exhaled (Machine Breath) (ml): 639 ml (19 08) Vt Spont (ml): 437 ml (19 1035) Ve Observed (l/min): 7.25 l/min (19 1035) Oxygen Therapy: 
Oxygen Therapy O2 Sat (%): 92 % (20 1544) Pulse via Oximetry: 109 beats per minute (20 1544) O2 Device: Room air (20 1544) PEEP/CPAP (cm H2O): 1 cm H20 (20 1101) O2 Flow Rate (L/min): 1 l/min (20 1440) FIO2 (%): 21 % (20 154) CXR: 
 
Admission Weight: Last Weight Weight: 192 lb 10.9 oz (87.4 kg) Weight: 186 lb 1.1 oz (84.4 kg) Intake / Output / Drain: 
Current Shift:  07 - 1900 In: 550 [P.O.:550] Out: 700 [Urine:700] Last 24 hrs.:  
 
Intake/Output Summary (Last 24 hours) at 1/27/2020 1553 Last data filed at 1/27/2020 1150 Gross per 24 hour Intake 750 ml Output 1125 ml Net -375 ml No results for input(s): CPK, CKMB, TROIQ in the last 72 hours. Recent Labs  
  01/27/20 
1018 01/26/20 
0352  140  
K 4.6 4.3  
CO2 29 29 BUN 17 18 CREA 1.05 1.03  
GLU 83 94 MG  --  2.0 WBC 4.5 3.4* HGB 10.3* 9.8* HCT 34.5* 33.1*  
* 136* Recent Labs  
  01/27/20 
1018 01/26/20 
0352 INR 1.4* 1.8* PTP 14.0* 17.5* No lab exists for component: PBNP Current Facility-Administered Medications:  
  clonazePAM (KlonoPIN) tablet 0.25 mg, 0.25 mg, Oral, TID, Elsa Herrera NP 
  albuterol-ipratropium (DUO-NEB) 2.5 MG-0.5 MG/3 ML, 3 mL, Nebulization, TID RT, Alfonso Herrera NP, 3 mL at 01/27/20 1544   acetylcysteine (MUCOMYST) 200 mg/mL (20 %) solution 600 mg, 600 mg, Nebulization, TID RT, Polliard, Alfonso [place] T, NP, 600 mg at 01/27/20 1544 
  arformoteroL (BROVANA) neb solution 15 mcg, 15 mcg, Nebulization, BID RT **AND** budesonide (PULMICORT) 500 mcg/2 ml nebulizer suspension, 500 mcg, Nebulization, BID RT, Elsa Herrera NP 
  [START ON 1/28/2020] albumin human 25% (BUMINATE) solution 12.5 g, 12.5 g, IntraVENous, DAILY, Elsa Herrera NP 
  warfarin (COUMADIN) tablet 3 mg, 3 mg, Oral, ONCE, Elsa Herrera NP 
  [START ON 1/29/2020] epoetin yvonne-epbx (RETACRIT) injection 20,000 Units, 20,000 Units, SubCUTAneous, Q MON, WED & FRI, Elsa Herrera, NP 
  sodium chloride tablet 1 g, 1 g, Oral, BID WITH MEALS, Dorothea Dailey MD, 1 g at 01/27/20 4823   potassium chloride SR (KLOR-CON 10) tablet 10 mEq, 10 mEq, Oral, DAILY, Shana Sims NP, 10 mEq at 01/27/20 9189   sodium chloride (NS) flush 5-40 mL, 5-40 mL, IntraVENous, PRN, Jose Cuello MD, 10 mL at 01/25/20 2357   naloxone [facility]) injection 0.4 mg, 0.4 mg, IntraVENous, PRN, Cordell MD Jenifer 
  hydrALAZINE (APRESOLINE) 20 mg/mL injection 20 mg, 20 mg, IntraVENous, Q6H PRN, Alexi Simsin B, NP, 20 mg at 01/27/20 5537   clonazePAM (KlonoPIN) tablet 0.25 mg, 0.25 mg, Oral, QHS PRN, Alexi Simsin B, NP 
  meropenem (MERREM) 500 mg in 0.9% sodium chloride (MBP/ADV) 50 mL, 0.5 g, IntraVENous, Q6H, Juan C Olivares MD, Last Rate: 100 mL/hr at 01/27/20 1406, 500 mg at 01/27/20 1406   levETIRAcetam (KEPPRA) tablet 250 mg, 250 mg, Oral, BID, Javed Beck MD, 250 mg at 01/27/20 2571   senna-docusate (PERICOLACE) 8.6-50 mg per tablet 1 Tab, 1 Tab, Oral, PRN, Batsheva Lawrence MD, 1 Tab at 01/18/20 0149   collagenase (SANTYL) 250 unit/gram ointment, , Topical, DAILY, Shana Sims, NP 
  fludrocortisone (FLORINEF) tablet 0.1 mg, 0.1 mg, Oral, DAILY, Alexi Simsin B, NP, 0.1 mg at 01/27/20 8002   cholecalciferol (VITAMIN D3) (1000 Units /25 mcg) tablet 2 Tab, 2,000 Units, Oral, DAILY, Jacobo Herrera NP, 2 Tab at 01/27/20 0836 
  venlafaxine-SR (EFFEXOR-XR) capsule 150 mg, 150 mg, Oral, DAILY WITH BREAKFAST, Юлия Herrera NP, 150 mg at 01/27/20 0800   hydrocortisone (CORTAID) 1 % cream, , Topical, TID PRN, Luz Maria Altman MD 
  thiamine HCL (B-1) tablet 100 mg, 100 mg, Oral, DAILY, Sierra Castro NP, 100 mg at 01/27/20 2328   oxybutynin (DITROPAN) tablet 5 mg, 5 mg, Oral, Q6H PRN, Shana Sims, NP, 5 mg at 01/01/20 1204   magnesium oxide (MAG-OX) tablet 800 mg, 800 mg, Oral, BID, Sierra Castro NP, 800 mg at 01/27/20 2353   lidocaine (URO-JET/ GLYDO) 2 % jelly, , Urethral, PRN, Mounika Altman MD 
  albuterol-ipratropium (DUO-NEB) 2.5 MG-0.5 MG/3 ML, 3 mL, Nebulization, Q6H PRN, Etelvina Nunez MD, 3 mL at 01/25/20 8138   therapeutic multivitamin (THERAGRAN) tablet 1 Tab, 1 Tab, Oral, DAILY, Shana Sims, NP, 1 Tab at 01/27/20 0799   alteplase (CATHFLO) 1 mg in sterile water (preservative free) 1 mL injection, 1 mg, InterCATHeter, PRN, Mounika Sandoval MD, 1 mg at 01/20/20 1156   finasteride (PROSCAR) tablet 5 mg, 5 mg, Oral, DAILY, Sudhir Marie MD, 5 mg at 01/27/20 0830   diphenhydrAMINE (BENADRYL) capsule 25 mg, 25 mg, Oral, QHS PRN, Fabricio Herrera NP, 25 mg at 01/19/20 0044   octreotide (SANDOSTATIN) injection 25 mcg, 25 mcg, SubCUTAneous, TID, Sharon, Ciro Noel NP, 25 mcg at 01/27/20 2983   pantoprazole (PROTONIX) tablet 40 mg, 40 mg, Oral, ACB, PollFabricio capone, NP, 40 mg at 01/27/20 5601   lactobac ac& pc-s.therm-b.anim (TIAN Q/RISAQUAD), 1 Cap, Oral, DAILY, Carmelo Velásquez MD, 1 Cap at 01/27/20 9705   oxyCODONE IR (ROXICODONE) tablet 5 mg, 5 mg, Oral, Q6H PRN, Freddie Javier MD, 5 mg at 12/30/19 0405   tamsulosin (FLOMAX) capsule 0.4 mg, 0.4 mg, Oral, DAILY, Logan Kincaid MD, 0.4 mg at 01/27/20 0830   Warfarin MD/NP dosing, , Other, PRN, Mounika Altman MD 
  sodium chloride (NS) flush 5-40 mL, 5-40 mL, IntraVENous, Q8H, Freddie Javier MD, 10 mL at 01/27/20 9380   sodium chloride (NS) flush 5-40 mL, 5-40 mL, IntraVENous, PRN, Freddie Javier MD, 10 mL at 01/20/20 1326   acetaminophen (TYLENOL) tablet 650 mg, 650 mg, Oral, Q6H PRN, Freddie Javier MD, 650 mg at 01/21/20 1512 
  naloxone [facility]) injection 0.4 mg, 0.4 mg, IntraVENous, PRN, Freddie Javier MD 
  ondansetron [facility]) injection 4 mg, 4 mg, IntraVENous, Q4H PRN, Freddie Javier MD, 4 mg at 01/24/20 0477 
  sucralfate (CARAFATE) tablet 1 g, 1 g, Oral, AC&HS, Freddie Javier MD, Stopped at 01/27/20 0685   influenza vaccine 2019-20 (6 mos+)(PF) (FLUARIX/FLULAVAL/FLUZONE QUAD) injection 0.5 mL, 0.5 mL, IntraMUSCular, PRIOR TO DISCHARGE, Mounika Altman MD 
 
A/P 
  
S/P LVAD - good flows Need for anti-coagulation - coumadin DM - insulin RV dysfunction - milrinone, diuretics  
  
 Risk of morbidity and mortality - high Medical decision making - high complexity 
  
 
Signed By: Rufus Mills MD

## 2025-03-10 NOTE — PROGRESS NOTES
0730- Report obtained from [facility]. Patient in bed. Denies pain. 0830- Patient needs PA catheter today. Anesthesia called now. They will get to the patient as soon as possible. 1130- MAC inserted by Dr. Sandy Hugo. Unable to float the swan. Recommends to use fluoroscopy. [phone] 33- ENT MD Giles Parmar) performs exam. Patient tolerated well. 26- Dr. Dionicio Garnica notified of current SWAN issues. She will come to the bedside. 1300- Dr. Dionicio Garnica able to float the SWAN to appropriate location. RV 51/4, PAP 59/28, Wedge 25, CVP 12.   
1600- Uneventful afternoon. Patient up to the chair. Family at the bedside. 1930- Uneventful evening. Report given to [facility]. Freda Tolentino MD  You28 minutes ago (8:05 AM)       Discontinue Ozempic.  We can add Jardiance 10 mg p.o. a.m.

## 2025-05-31 NOTE — ED NOTES
Heart team RN at bedside to transfer pt to LVAD tower. [[email or web address]_OBGYN_ALL_OB_FT:[id number]],[NS_DeliveryAssist1_OBGYN_ALL_OB_FT:[id number]],[NS_DeliveryRN_OBGYN_ALL_OB_FT:[id number]]

## 2025-07-21 NOTE — PROGRESS NOTES
"TRANSFER - OUT REPORT: 
 
Verbal report given to Don(name) chloe Kiser  being transferred to Cath lab recovery(unit) for routine progression of care Report consisted of patients Situation, Background, Assessment and  
Recommendations(SBAR). Information from the following report(s) Procedure Summary and MAR was reviewed with the receiving nurse. Lines:  
Peripheral IV 06/06/19 Right Hand (Active) Site Assessment Clean, dry, & intact 6/10/2019  8:23 AM  
Phlebitis Assessment 0 6/10/2019  8:23 AM  
Infiltration Assessment 0 6/10/2019  8:23 AM  
Dressing Status Clean, dry, & intact 6/10/2019  8:23 AM  
Dressing Type Transparent;Tape 6/10/2019  8:23 AM  
Hub Color/Line Status Blue;Capped 6/10/2019  8:23 AM  
Action Taken Open ports on tubing capped 6/10/2019  8:23 AM  
Alcohol Cap Used Yes 6/10/2019  8:23 AM  
   
Peripheral IV 06/08/19 Right Forearm (Active) Site Assessment Clean, dry, & intact 6/10/2019  8:23 AM  
Phlebitis Assessment 0 6/10/2019  8:23 AM  
Infiltration Assessment 0 6/10/2019  8:23 AM  
Dressing Status Clean, dry, & intact 6/10/2019  8:23 AM  
Dressing Type Transparent;Tape 6/10/2019  8:23 AM  
Hub Color/Line Status Pink; Infusing 6/10/2019  8:23 AM  
Action Taken Open ports on tubing capped 6/10/2019  8:23 AM  
Alcohol Cap Used Yes 6/10/2019  8:23 AM  
  
 
Opportunity for questions and clarification was provided. Patient transported with: 
 Registered Nurse

## (undated) DEVICE — TOWEL SURG W17XL27IN STD BLU COT NONFENESTRATED PREWASHED

## (undated) DEVICE — HANDLE LT SNAP ON ULT DURABLE LENS FOR TRUMPF ALC DISPOSABLE

## (undated) DEVICE — 3M™ IOBAN™ 2 ANTIMICROBIAL INCISE DRAPE 6650EZ: Brand: IOBAN™ 2

## (undated) DEVICE — STERILE POLYISOPRENE POWDER-FREE SURGICAL GLOVES WITH EMOLLIENT COATING: Brand: PROTEXIS

## (undated) DEVICE — MARKER,SKIN,WI/RULER AND LABELS: Brand: MEDLINE

## (undated) DEVICE — SUTURE SZ 7 L18IN NONABSORBABLE SIL CCS L48MM 1/2 CIR STRNM M655G

## (undated) DEVICE — CATH BLLN PTA .035 8X40X80 -- EVERCROSS OTW

## (undated) DEVICE — KIT MED IMAG CNTRST AGNT W/ IOPAMIDOL REUSE

## (undated) DEVICE — INTRO SHTH 7FR 13X20CM -- TEARAWAY

## (undated) DEVICE — RADIFOCUS GLIDEWIRE: Brand: GLIDEWIRE

## (undated) DEVICE — CATH URETH FOL 3W SH 18FRX5ML -- LUBRICATH

## (undated) DEVICE — CONNECTOR CATH W3/8XH1/2IN STR REDUC TMP

## (undated) DEVICE — CATHETER DIAG 6FR L110CM INTRO 6FR BLLN DIA9MM 1CC PULM ART

## (undated) DEVICE — Z INACTIVE USE 2527070 DRAPE SURG W40XL44IN UNDERBUTTOCK SMS POLYPR W/ PCH BK DISP

## (undated) DEVICE — STERILE POLYISOPRENE POWDER-FREE SURGICAL GLOVES: Brand: PROTEXIS

## (undated) DEVICE — PINNACLE INTRODUCER SHEATH: Brand: PINNACLE

## (undated) DEVICE — SOLUTION IV 1000ML 0.9% SOD CHL

## (undated) DEVICE — SOLUTION IRRIG 3000ML 0.9% SOD CHL FLX CONT 0797208] ICU MEDICAL INC]

## (undated) DEVICE — KIT MFLD ISOLATN NACL CNTRST PRT TBNG SPIK W/ PRSS TRNSDUC

## (undated) DEVICE — Device: Brand: CLEANCUT ROTATING AORTIC PUNCH, 4.5 MM

## (undated) DEVICE — SUTURE COAT VCRL SZ 4-0 L18IN ABSRB UD L19MM PS-2 1/2 CIR J496G

## (undated) DEVICE — INFECTION CONTROL KIT SYS

## (undated) DEVICE — DRAINBAG,ANTI-REFLUX TOWER,L/F,2000ML,LL: Brand: MEDLINE

## (undated) DEVICE — SEALANT FIBRIN 10 CC FRZN PRE FILLED SYR TISSEEL

## (undated) DEVICE — PACK,CYSTOSCOPY,PK III,SIRUS: Brand: MEDLINE

## (undated) DEVICE — AGENT HEMSTAT W4XL4IN OXIDIZED REGENERATED CELOS ABSRB SFT

## (undated) DEVICE — MICROPUNCTURE INTRODUCER SET SILHOUETTE TRANSITIONLESS WITH STAINLESS STEEL WIRE GUIDE: Brand: MICROPUNCTURE

## (undated) DEVICE — 3M™ RANGER™ FLUID WARMING IRRIGATION SET, 24750, 10/CASE: Brand: 3M™ RANGER™

## (undated) DEVICE — SUTURE DEK POLY GRN BR SZ3 0 T 2 2N 6716

## (undated) DEVICE — TIDISHIELD UROLOGY DRAIN BAGS FROSTY VINYL STERILE FITS SIEMENS UROSKOP ACCESS 20 PER CASE: Brand: TIDISHIELD

## (undated) DEVICE — REM POLYHESIVE ADULT PATIENT RETURN ELECTRODE: Brand: VALLEYLAB

## (undated) DEVICE — STRAP,POSITIONING,KNEE/BODY,FOAM,4X60": Brand: MEDLINE

## (undated) DEVICE — DRESSING HEMOSTATIC SFT INTVENT W/O SLT DBL WRP QUIKCLOT LF

## (undated) DEVICE — CATH DIAGIMPLS MP 5FRX110CM -- IMPULSE 16391-300

## (undated) DEVICE — DRESSING SIL W4XL5IN ANTIBACT GELLING FBR CYTOFORM

## (undated) DEVICE — PLEDGET SURG W3/16XL0.25IN THK1.65MM PTFE OVL FELT FOR THE

## (undated) DEVICE — SYSTEM TISS GLUE 5ML CONTAIN SYR PLUNG STD SYR TIP 12MM 5PKS/EA

## (undated) DEVICE — Z DISCONTINUED USE 2599823 ELECTRODE ENDO 27FR 0.3MM MPLR LOOP DISP

## (undated) DEVICE — PRESSURE MONITORING SET: Brand: TRUWAVE

## (undated) DEVICE — CENTRAL VENOUS CATHETER SET: Brand: COOK

## (undated) DEVICE — GLUE TISS 10ML PLAS STD SPRD TIP SYR PLUNG PREFIL SKIN CLSR 5PK

## (undated) DEVICE — CUTTING ELECTRODE BIPO 24-26FR 12/30°  FOR RESECTOSCOPES, TELESCOPE Ø 4MM, FOR SHEATHS, INTERMITTENT/CONTINUOUS IRRIGATION, 24/26, FR, LOOP: ROUND, WIRE Ø 0.25MM, FORK COLOR BLUE, STEM COLOR BLUE, PACK=3 PCS, FOR SHARK AND S-LINE RESECTOSCOPES, STERILE, FOR SINGLE USE: Brand: SHARK/S-LINE

## (undated) DEVICE — SUTURE MCRYL SZ 3-0 L27IN ABSRB UD L24MM PS-1 3/8 CIR PRIM Y936H

## (undated) DEVICE — SPECIAL PROCEDURE DRAPE 32" X 34": Brand: SPECIAL PROCEDURE DRAPE

## (undated) DEVICE — 72" ARTERIAL PRESSURE TUBING: Brand: ICU MEDICAL

## (undated) DEVICE — CABLE PACE ALGTR CLP SAF 12FT --

## (undated) DEVICE — SUTURE VCRL SZ 2-0 L36IN ABSRB UD L40MM CT 1/2 CIR J957H

## (undated) DEVICE — GUIDEWIRE VASC L150CM DIA0.025IN TIP L7CM J RAD 3MM PTFE

## (undated) DEVICE — GUIDEWIRE VASC L180CM DIA0.035IN L10CM DIA3MM J TIP PTFE S

## (undated) DEVICE — DRAPE PRB US TRNSDCR 6X96IN --

## (undated) DEVICE — DRAIN,WOUND,ROUND,24FR,5/16",FULL-FLUTED: Brand: MEDLINE

## (undated) DEVICE — SUTURE PROL SZ 5-0 L30IN NONABSORBABLE BLU L13MM RB-2 1/2 8710H

## (undated) DEVICE — SOLUTION IV 500ML 0.9% SOD CHL FLX CONT

## (undated) DEVICE — KIT HND CTRL 3 W STPCOCK ROT END 54IN PREM HI PRSS TBNG AT

## (undated) DEVICE — ZINACTIVE USE 2641837 CLIP LIG M BLU TI HRT SHP WIRE HORZ 600 PER BX

## (undated) DEVICE — Device

## (undated) DEVICE — CATHETER F BLLN 5CC 18FR 2 W HYDRGEL COAT LESS TRAUM LUB

## (undated) DEVICE — 6FR THERMODILUTION BALLOON CATHETER SWAN (ARROW)

## (undated) DEVICE — BASIC SINGLE BASIN BTC-LF: Brand: MEDLINE INDUSTRIES, INC.

## (undated) DEVICE — INTENDED TO STANDARDIZE OR CAMERAS TO ALLOW FOR THE USE OF THE OR CAMERA COVER: Brand: ASPEN® O.R. CAMERA COVER

## (undated) DEVICE — Y-TYPE TUR/BLADDER IRRIGATION SET, REGULATING CLAMP

## (undated) DEVICE — TUBING, SUCTION, 1/4" X 10', STRAIGHT: Brand: MEDLINE

## (undated) DEVICE — DRSG FOAM SACR BORDER 16X20CM -- CONVERT TO ITEM 365922

## (undated) DEVICE — DRAPE FLD WRM W44XL66IN C6L FOR INTRATEMP SYS THERMABASIN

## (undated) DEVICE — MONOPOLAR CORD: Brand: VALLEYLAB

## (undated) DEVICE — SUTURE PROL SZ 6-0 L30IN NONABSORBABLE BLU L13MM RB-2 1/2 8711H

## (undated) DEVICE — PREP CHLORAPREP 10.5 ML ORG --

## (undated) DEVICE — TUBING, SUCTION, 1/4" X 12', STRAIGHT: Brand: MEDLINE

## (undated) DEVICE — PAD,ABDOMINAL,5"X9",ST,LF,25/BX: Brand: MEDLINE INDUSTRIES, INC.

## (undated) DEVICE — (D)PREP SKN CHLRAPRP APPL 26ML -- CONVERT TO ITEM 371833

## (undated) DEVICE — COVER LT HNDL PLAS RIG 1 PER PK

## (undated) DEVICE — 3M™ IOBAN™ 2 ANTIMICROBIAL INCISE DRAPE 6640EZ: Brand: IOBAN™ 2

## (undated) DEVICE — 1000ML,PRESSURE INFUSER W/STOPCOCK: Brand: MEDLINE

## (undated) DEVICE — DRAPE,REIN 53X77,STERILE: Brand: MEDLINE

## (undated) DEVICE — CO-SET DELIVERY SYSTEM FOR 123 ROOM TEMPATURE INJECTATE: Brand: CO-SET+

## (undated) DEVICE — GARMENT,MEDLINE,DVT,INT,CALF,MED, GEN2: Brand: MEDLINE

## (undated) DEVICE — ELECTROSURGICAL DEVICE HOLSTER;FOR USE WITH MAXIMUM PEAK VOLTAGE OF 4000 V: Brand: FORCE TRIVERSE

## (undated) DEVICE — MEDI-TRACE CADENCE ADULT, DEFIBRILLATION ELECTRODE -RTS  (10 PR/PK) - PHILIPS: Brand: MEDI-TRACE CADENCE

## (undated) DEVICE — SOL IRR GLYC 1.5 % 3000ML --

## (undated) DEVICE — SOLUTION IV 1000ML PH 7.4 INJ NRMSOL R

## (undated) DEVICE — SPONGE GZ W4XL4IN COT 12 PLY TYP VII WVN C FLD DSGN

## (undated) DEVICE — ANGIOGRAPHY KIT

## (undated) DEVICE — GDWIRE WHISPER HITORQ EDS CSJ -- ACUITY SOLD BY BX ONLY 4648

## (undated) DEVICE — VESSEL LOOPS,MAXI, BLUE: Brand: DEVON

## (undated) DEVICE — BAG RED 3PLY 2MIL 30X40 IN

## (undated) DEVICE — SYRINGE,TOOMEY,IRRIGATION,70CC,STERILE: Brand: MEDLINE

## (undated) DEVICE — FOGARTY ARTERIAL EMBOLECTOMY CATHETER 4F 80CM: Brand: FOGARTY

## (undated) DEVICE — SYR 3ML LL TIP 1/10ML GRAD --

## (undated) DEVICE — Z CONVERTED USE 2271365 TRAY SKIN W3XL3IN S STL STPL REMV GZ PD DISP MEDI PK

## (undated) DEVICE — SYR BULB 60ML IRRIGATION -- CONVERT TO ITEM 116413

## (undated) DEVICE — CV INCISE SHEET: Brand: CONVERTORS

## (undated) DEVICE — PACK PROCEDURE SURG HRT CATH

## (undated) DEVICE — GUIDEWIRE VASC L150CM DIA0.035IN FLX END L7CM J 3MM PTFE

## (undated) DEVICE — BLADE ELECTRODE: Brand: EDGE

## (undated) DEVICE — SENSOR TEMP SKIN DISP

## (undated) DEVICE — 40418 TRENDELENBURG ONE-STEP ARM PROTECTORS LARGE (1 PAIR): Brand: 40418 TRENDELENBURG ONE-STEP ARM PROTECTORS LARGE (1 PAIR)

## (undated) DEVICE — PLASMABLADE PS200-040 4.0: Brand: PLASMABLADE™

## (undated) DEVICE — DRSG BORDR MPLX HEEL 8.7X9.1IN --

## (undated) DEVICE — STAPLER SKIN L320MM 35MM VASC TISS 12 FIRING B FRM PWR

## (undated) DEVICE — 3M™ TEGADERM™ TRANSPARENT FILM DRESSING FRAME STYLE, 1626W, 4 IN X 4-3/4 IN (10 CM X 12 CM), 50/CT 4CT/CASE: Brand: 3M™ TEGADERM™

## (undated) DEVICE — PENCIL ES L3M BTTN SWCH S STL HEX LOK BLDE ELECTRD HOLSTER

## (undated) DEVICE — CHEMO SPILL KIT: Brand: CHEMOPLUS

## (undated) DEVICE — SYR 10ML LUER LOK 1/5ML GRAD --

## (undated) DEVICE — DRESSING AQUACEL ADVANTAGE ALG W4XL5IN RECT GREATER ABSRB HYDRFBR TECHNOLOGY

## (undated) DEVICE — SYSTEM INTRO 9.5FR L13CM STD WHT CAP HEMSTAT SPLITTABLE

## (undated) DEVICE — SUTURE PERMA-HAND 0 L18IN NONABSORBABLE BLK CT-1 L36MM 1/2 C021D

## (undated) DEVICE — 6 FOOT DISPOSABLE EXTENSION CABLE WITH SAFE CONNECT / SCREW-DOWN

## (undated) DEVICE — DRSG AQUACEL SURG 3.5X6IN -- CONVERT TO ITEM 369227

## (undated) DEVICE — SUT SLK 0 30IN CT1 BLK --

## (undated) DEVICE — GUIDE CATHETER: Brand: RUNWAY™

## (undated) DEVICE — CONTAINER,SPECIMEN,4OZ,OR STRL: Brand: MEDLINE

## (undated) DEVICE — APPLICATOR BNDG 1MM ADH PREMIERPRO EXOFIN

## (undated) DEVICE — CATHETER VENT ASST L PERC IMPELLA 5.0

## (undated) DEVICE — SUTURE N ABSRB BRAIDED 5-0 60 IN TIE BLK PERMA HND SA11G

## (undated) DEVICE — TEMP PACING WIRE: Brand: MYO/WIRE

## (undated) DEVICE — BATTERY SET 14 V CLP HEARTMATE

## (undated) DEVICE — NEEDLE HYPO 18GA L1.5IN PNK S STL HUB POLYPR SHLD REG BVL

## (undated) DEVICE — CONTROLLER VENT ASST W EMGCY BK BTTRY HEARTMATE 3

## (undated) DEVICE — SUTURE VCRL SZ 3-0 L27IN ABSRB UD L26MM SH 1/2 CIR J416H

## (undated) DEVICE — SUT SLK 2 60IN TIE MP BLK --

## (undated) DEVICE — Z DISCONTINUEDSOLUTION PREP 2OZ 10% POVIDONE IOD SCR CAP BTL

## (undated) DEVICE — TUBING HYDR IRR --

## (undated) DEVICE — LEAD PCMKR MYOCARDL BPLR TEMP. --

## (undated) DEVICE — GUIDEWIRE VASC L260CM DIA0.18IN TRITON PTFE HYDRPHLC STEER

## (undated) DEVICE — SOLUTION SCRB 4OZ 4% CHG H2O AIDED FOR PREOPERATIVE SKIN

## (undated) DEVICE — SUTURE VCRL SZ 3-0 L27IN ABSRB VLT L26MM SH 1/2 CIR J316H

## (undated) DEVICE — CATHETER,FOLEY,100%SILICONE,16FR,10ML,LF: Brand: MEDLINE

## (undated) DEVICE — CATH DIAG AL2 IMPLS 6FRX100CM -- IMPULSE 16599-98

## (undated) DEVICE — DEVICE SECUREMENT 1/32IN POLYETH FOAM F ANCHR URIN CATH

## (undated) DEVICE — FOGARTY - HYDRAGRIP SURGICAL - CLAMP INSERTS: Brand: FOGARTY SOFTJAW

## (undated) DEVICE — TRANSFER PK BLD PROD 300ML --

## (undated) DEVICE — TIDISHIELD TRANSPORT, CONTAINMENT COVER FOR BACK TABLE 4'6" (1.37M) TO 8' (2.43M) IN LENGTH: Brand: TIDISHIELD

## (undated) DEVICE — KIT,1200CC CANISTER,3/16"X6' TUBING: Brand: MEDLINE INDUSTRIES, INC.

## (undated) DEVICE — SUT PROL 6-0 18IN BV1 DA BLU --

## (undated) DEVICE — SUTURE NONABSORBABLE MONOFILAMENT 4-0 CV-5 PT-13 24 IN GORTX 5K08B

## (undated) DEVICE — Device: Brand: PADPRO

## (undated) DEVICE — COATED BRAID POLY

## (undated) DEVICE — SUTURE VCRL SZ 0 L18IN ABSRB VLT L40MM CT 1/2 CIR J752D

## (undated) DEVICE — TTL1LYR 16FR10ML 100%SIL TMPST TR: Brand: MEDLINE

## (undated) DEVICE — 3M™ TEGADERM™ TRANSPARENT FILM DRESSING FRAME STYLE, 1629, 8 IN X 12 IN (20 CM X 30 CM), 10/CT 8CT/CASE: Brand: 3M™ TEGADERM™

## (undated) DEVICE — PLEDGET SUT SFT OVL 3 8X5 16IN

## (undated) DEVICE — SYR 50ML LR LCK 1ML GRAD NSAF --

## (undated) DEVICE — SOLUTION IRRIG 1000ML H2O STRL BLT

## (undated) DEVICE — GOWN,SIRUS,FABRNF,XL,20/CS: Brand: MEDLINE

## (undated) DEVICE — DEVICE INFL 20ML L13IN 30ATM CLR POLYCARB TB PRTBL DGT

## (undated) DEVICE — RELOAD STPL H1-2.5XL35MM VASC THN TISS WHT B FRM NAT ARTC

## (undated) DEVICE — GLOVE SURG SZ 7.5 L11.2IN THK9.8MIL STRW LTX POLYMER BEAD

## (undated) DEVICE — GOWN,SIRUS,NONRNF,SETINSLV,XL,20/CS: Brand: MEDLINE

## (undated) DEVICE — GUIDEWIRE VASC L260CM DIA0.035IN L7CM DIA3MM J TIP PTFE S

## (undated) DEVICE — SUMP INTCARD W/ 1/4INCH CONN 20FRENCH 15INCH DLP

## (undated) DEVICE — GOWN,PLEAT,SPECIALTY,XL,STRL: Brand: MEDLINE

## (undated) DEVICE — 6 INCH MONITORING EXTENSION SET: Brand: ICU MEDICAL

## (undated) DEVICE — Z CONVERTED USE 2107985 COVER FLROSCP W36XL28IN 4 SIDE ADH

## (undated) DEVICE — SOLUTION SCRB 2OZ 10% POVIDONE IOD ANTIMIC BTL

## (undated) DEVICE — COVER,TABLE,HEAVY DUTY,60"X90",STRL: Brand: MEDLINE

## (undated) DEVICE — SUTURE PROL 5-0 L18IN NONABSORBABLE BLU RB-2 L13MM 1/2 CIR 8713H

## (undated) DEVICE — SUT ETHLN 3-0 18IN PS2 BLK --

## (undated) DEVICE — DRAIN SPONGES,6 PLY: Brand: EXCILON

## (undated) DEVICE — CANN QUICK DRAW 25FR --

## (undated) DEVICE — PLASMABLADE PS210-030S 3.0S LOCK: Brand: PLASMABLADE™

## (undated) DEVICE — SUTURE PROL SZ 4-0 L36IN NONABSORBABLE BLU L26MM SH 1/2 CIR 8521H

## (undated) DEVICE — SYS INCISION MANAGEMENT -- PREVENA

## (undated) DEVICE — CONNECTOR DRNGE W3/8-0.5XH3/16XL3/16IN 2:1 SIL CARD STR

## (undated) DEVICE — CLIP INT SM WIDE RED TI TRNSVRS GRV CHEVRON SHP W PRECIS

## (undated) DEVICE — Device: Brand: VASCULAR DILATOR KIT

## (undated) DEVICE — SURGICAL PROCEDURE PACK BASIN MAJ SET CUST NO CAUT

## (undated) DEVICE — KENDALL RADIOLUCENT FOAM MONITORING ELECTRODE RECTANGULAR SHAPE: Brand: KENDALL

## (undated) DEVICE — WRAP SURG W1.31XL1.34M CARD FOR PT 165-172CM THERMOWRP

## (undated) DEVICE — DRAPE XR C ARM 41X74IN LF --

## (undated) DEVICE — DRAPE SURG W25XL50IN E OPN CIR BND BG

## (undated) DEVICE — SUTURE VCRL SZ 3-0 L27IN ABSRB UD FS-2 L19MM 1/2 CIR J423H

## (undated) DEVICE — SUT PROL 4-0 36IN RB1 DA BLU --

## (undated) DEVICE — CATH ANGI 5F STRD145DPTAIL 110 -- SITESEER

## (undated) DEVICE — SUMP PERICARD AD 20FR WT TIP VERSATILE DSGN MULT PRT VENT

## (undated) DEVICE — SYR LR LCK 1ML GRAD NSAF 30ML --

## (undated) DEVICE — BATTERY SET 14 V LVAD LITHIUM HEARTMATE